# Patient Record
Sex: FEMALE | Race: BLACK OR AFRICAN AMERICAN | Employment: OTHER | URBAN - METROPOLITAN AREA
[De-identification: names, ages, dates, MRNs, and addresses within clinical notes are randomized per-mention and may not be internally consistent; named-entity substitution may affect disease eponyms.]

---

## 2017-01-04 ENCOUNTER — TELEPHONE (OUTPATIENT)
Dept: FAMILY MEDICINE CLINIC | Age: 67
End: 2017-01-04

## 2017-01-04 ENCOUNTER — OFFICE VISIT (OUTPATIENT)
Dept: FAMILY MEDICINE CLINIC | Age: 67
End: 2017-01-04

## 2017-01-04 VITALS
HEIGHT: 64 IN | DIASTOLIC BLOOD PRESSURE: 81 MMHG | SYSTOLIC BLOOD PRESSURE: 157 MMHG | OXYGEN SATURATION: 96 % | TEMPERATURE: 98.8 F | WEIGHT: 198.6 LBS | RESPIRATION RATE: 20 BRPM | HEART RATE: 96 BPM | BODY MASS INDEX: 33.9 KG/M2

## 2017-01-04 DIAGNOSIS — I10 ESSENTIAL HYPERTENSION WITH GOAL BLOOD PRESSURE LESS THAN 140/90: Primary | ICD-10-CM

## 2017-01-04 DIAGNOSIS — M25.511 BILATERAL SHOULDER PAIN, UNSPECIFIED CHRONICITY: ICD-10-CM

## 2017-01-04 DIAGNOSIS — Z91.89 COMPLIANCE WITH MEDICATION REGIMEN: ICD-10-CM

## 2017-01-04 DIAGNOSIS — E78.1 HYPERTRIGLYCERIDEMIA: ICD-10-CM

## 2017-01-04 DIAGNOSIS — E11.9 DIABETES MELLITUS TYPE 2, INSULIN DEPENDENT (HCC): ICD-10-CM

## 2017-01-04 DIAGNOSIS — E78.5 DYSLIPIDEMIA, GOAL LDL BELOW 70: ICD-10-CM

## 2017-01-04 DIAGNOSIS — Z79.4 DIABETES MELLITUS TYPE 2, INSULIN DEPENDENT (HCC): ICD-10-CM

## 2017-01-04 DIAGNOSIS — M25.512 BILATERAL SHOULDER PAIN, UNSPECIFIED CHRONICITY: ICD-10-CM

## 2017-01-04 DIAGNOSIS — J00 COMMON COLD: ICD-10-CM

## 2017-01-04 DIAGNOSIS — M17.0 PRIMARY OSTEOARTHRITIS OF BOTH KNEES: ICD-10-CM

## 2017-01-04 DIAGNOSIS — R94.4 DECREASED GFR: ICD-10-CM

## 2017-01-04 RX ORDER — OMEGA-3-ACID ETHYL ESTERS 1 G/1
2 CAPSULE, LIQUID FILLED ORAL
Qty: 180 CAP | Refills: 0 | Status: SHIPPED | COMMUNITY
Start: 2017-01-04 | End: 2017-04-26 | Stop reason: SDUPTHER

## 2017-01-04 RX ORDER — CARVEDILOL PHOSPHATE 20 MG/1
20 CAPSULE, EXTENDED RELEASE ORAL
Qty: 90 CAP | Refills: 3 | Status: SHIPPED | COMMUNITY
Start: 2017-01-04 | End: 2017-07-20 | Stop reason: ALTCHOICE

## 2017-01-04 RX ORDER — TRAMADOL HYDROCHLORIDE 50 MG/1
50 TABLET ORAL
Qty: 60 TAB | Refills: 0 | Status: SHIPPED | OUTPATIENT
Start: 2017-01-04 | End: 2017-02-02 | Stop reason: SDUPTHER

## 2017-01-04 RX ORDER — AMLODIPINE BESYLATE AND ATORVASTATIN CALCIUM 10; 80 MG/1; MG/1
1 TABLET, FILM COATED ORAL DAILY
Qty: 90 TAB | Refills: 0 | Status: SHIPPED | COMMUNITY
Start: 2017-01-04 | End: 2017-04-18 | Stop reason: SDUPTHER

## 2017-01-04 RX ORDER — LISINOPRIL 20 MG/1
20 TABLET ORAL DAILY
Qty: 30 TAB | Refills: 3 | Status: SHIPPED | OUTPATIENT
Start: 2017-01-04 | End: 2017-04-26 | Stop reason: SDUPTHER

## 2017-01-04 RX ORDER — AMLODIPINE BESYLATE AND ATORVASTATIN CALCIUM 10; 80 MG/1; MG/1
1 TABLET, FILM COATED ORAL DAILY
Qty: 90 TAB | Refills: 3 | Status: SHIPPED | COMMUNITY
Start: 2017-01-04 | End: 2017-07-20 | Stop reason: SDUPTHER

## 2017-01-04 RX ORDER — OMEGA-3-ACID ETHYL ESTERS 1 G/1
2 CAPSULE, LIQUID FILLED ORAL
Qty: 180 CAP | Refills: 3 | Status: SHIPPED | COMMUNITY
Start: 2017-01-04 | End: 2017-07-20 | Stop reason: SDUPTHER

## 2017-01-04 NOTE — PROGRESS NOTES
[address]  [id number] [address] [facility] [address] [facility] [address] [facility]  [phone] [facility]/[phone] fax      1/4/2017    Reason for visit:   Chief Complaint   Patient presents with    Results       Patient: Melba Garcia, 1950, [id number]       Primary MD: Angel Oleary NP    Subjective:   Melba Garcia, a 61 y.o. female, who presents for Results      Past Medical History   Diagnosis Date    Abnormal WBC count 5/17/2016    Bilateral shoulder pain 9/27/2016    Chondromalacia of both patellae     Chronic lung disease     Chronic obstructive pulmonary disease ([facility] [address] 5/17/2016    COPD (chronic obstructive pulmonary disease) ([facility] [address] 9/2015     mild-mod dz; Dr Madalyn Francois    Diabetes [facility]) 2013    Diabetes mellitus ([facility] [address]     Dilated cardiomyopathy ([facility] [address]     Dyslipidemia     Elevated alkaline phosphatase level 9/27/2016    History of echocardiogram 11/14/2014     Mild LVE. EF 40%. Mild, diffuse hypk. Mild LAE. Mild MR.      Hypercholesteremia 1/08/14    Hypertension 2000    Noncompliance with medications 2/16/2016    Obesity     Orthostatic hypotension 5/17/2016    Osteoarthritis of both knees     Pain management 04/01/2016     Dr. Cristine Mcgee Popliteal cyst, bilateral      Vitamin D deficiency 10/16/14       No past surgical history on file.     Social History     Social History    Marital status: LEGALLY      Spouse name: N/A    Number of children: 3    Years of education: N/A     Occupational History    retired      Social History Main Topics    Smoking status: Former Smoker     Packs/day: 0.25     Years: 45.00     Types: Cigarettes     Quit date: 8/17/2015    Smokeless tobacco: Not on file    Alcohol use No    Drug use: No    Sexual activity: Yes     Other Topics Concern     Service No    Blood Transfusions No    Caffeine Concern No    Occupational Exposure No    Hobby Hazards No    Sleep Concern No    Stress Concern No    Weight Concern No    Special Diet No    Back Care No    Exercise No    Bike Helmet No    Seat Belt Yes    Self-Exams Yes     Social History Narrative       No Known Allergies    Current Outpatient Prescriptions on File Prior to Visit   Medication Sig Dispense Refill    albuterol (PROVENTIL VENTOLIN) 2.5 mg /3 mL (0.083 %) nebulizer solution 3 mL by Nebulization route every four (4) hours as needed for Wheezing. 60 Each 3    carvedilol (COREG) 12.5 mg tablet Take 1 Tab by mouth two (2) times daily (with meals). Indications: HYPERTENSION 60 Tab 3    magnesium oxide (MAG-OX) 400 mg tablet Take 1 Tab by mouth daily. Indications: HYPOMAGNESEMIA 30 Tab 11    insulin detemir (LEVEMIR) 100 unit/mL injection 15 units daily  Indications: TYPE 2 DIABETES MELLITUS 6 Vial 3    ferrous sulfate 325 mg (65 mg iron) tablet Take 1 Tab by mouth two (2) times a day. Indications: IRON DEFICIENCY ANEMIA 60 Tab 11    albuterol (PROVENTIL HFA, VENTOLIN HFA, PROAIR HFA) 90 mcg/actuation inhaler Take 2 Puffs by inhalation every four (4) hours as needed for Wheezing. 3 Inhaler 3    desloratadine (CLARINEX) 5 mg tablet Take 1 Tab by mouth daily. 30 Tab 0    mometasone (NASONEX) 50 mcg/actuation nasal spray 2 Sprays by Both Nostrils route daily. 3 Container 3    umeclidinium-vilanterol (ANORO ELLIPTA) 62.5-25 mcg/actuation dsdv Take 1 Puff by inhalation daily. 4 Each 3     No current facility-administered medications on file prior to visit. Review of Systems   Constitutional:        I tried to get my care card again but [facility] said I have a bill for $127 and that is not enough to qualify me to get the care card again. I didn't know I had to  caduet in April, no one called me. HENT: Negative. Eyes: Negative. Respiratory: Positive for cough (started Dec 30. using Clarinex and nasonex. Non productive cough. ). Stopped smoking in Aug   Cardiovascular: Negative for chest pain, palpitations and leg swelling. Gastrointestinal: Negative. Genitourinary: Negative. Musculoskeletal: Positive for joint pain. Was going to pain mgt up until Sept when her care card  and couldn't afford $75 Last time she got meds was percocet and morphine in Sept. Ran out last month. rec'd 2 cortisone injections in knees while in pain mgt. Been taking Aleve PM, it dont take the pain away but helps me sleep. Pain in shoulders is new. Was seeing pain mgt only for knees. Pain score 10/10. Nothing aggravates the pain or makes it better. Skin: Negative. Neurological: Negative. Endo/Heme/Allergies: Positive for environmental allergies. Psychiatric/Behavioral: Negative. Objective:   Visit Vitals    /81    Pulse 96    Temp 98.8 °F (37.1 °C)    Resp 20    Ht 5' 4\" (1.626 m)    Wt 198 lb 9.6 oz (90.1 kg)    SpO2 96%    BMI 34.09 kg/m2      Wt Readings from Last 3 Encounters:   17 198 lb 9.6 oz (90.1 kg)   16 195 lb (88.5 kg)   16 197 lb (89.4 kg)     Lab Results   Component Value Date/Time    Glucose 148 2016 09:26 AM    Glucose (POC) 134 2015 11:25 AM    Glucose  2016 09:37 AM         Physical Exam   Constitutional: She is oriented to person, place, and time. She appears well-developed and well-nourished. HENT:   Head: Atraumatic. Neck: Neck supple. Cardiovascular: Normal rate, regular rhythm and normal heart sounds. No edema to LEs   Pulmonary/Chest: Effort normal and breath sounds normal.   Abdominal: Soft. Musculoskeletal:        Right shoulder: She exhibits crepitus. Right knee: She exhibits decreased range of motion. Lateral joint line tenderness: crepitus in Right knee. Left knee: She exhibits decreased range of motion. Right upper extremity weaker than the left. Pt able to maneuver her UEs w/o difficulty. Noted pt to have a steady gait. Neurological: She is alert and oriented to person, place, and time.    Skin: Skin is warm and dry. Psychiatric: She has a normal mood and affect. Her behavior is normal. Judgment and thought content normal.       Assessment:    Sharon Rich who has risk factors including (see above previous medical hx) and:     1. Essential hypertension with goal blood pressure less than 140/90  Pt is currently taking Coreg 12.5mg BID. Will prescribe coreg cr via TP for financial and managing issues. Instructed pt to complete the coreg 12.5mg in full before starting the coreg cr every day. Pt verb understanding    - amLODIPine-atorvastatin (CADUET) 10-80 mg per tablet; Take 1 Tab by mouth daily. Dispense: 90 Tab; Refill: 0  - lisinopril (PRINIVIL, ZESTRIL) 20 mg tablet; Take 1 Tab by mouth daily. Dispense: 30 Tab; Refill: 3  - carvedilol (COREG CR) 20 mg CR capsule; Take 1 Cap by mouth daily (with breakfast). Dispense: 90 Cap; Refill: 3  - amLODIPine-atorvastatin (CADUET) 10-80 mg per tablet; Take 1 Tab by mouth daily. Dispense: 90 Tab; Refill: 3    2. Hypertriglyceridemia  TG over 150, will have patient work on diet to reduce simple carbohydrates, alcohol and saturated fats from diet. A certain amount of triglycerides are needed for your body to function, but excess is stored as fat. Triglycerides have always been known to raise heart disease risk. Foods to lower triglycerides   Cold water fish such as salmon, tuna, and cod. Olive oil and walnut oil. Grapes and Blueberries. Beans, legumes, peas and lentils. Spinach, broccoli, and [place] sprouts. Tomatoes, oranges, grapefruit, garry, and limes. Foods to avoid  Butter and margarine are simply triglycerides in a stick or tub form. Palm, coconut oil,  Lard. Eating candy and sugary foods. Fatty red meat, poultry skin, butter, high-fat dairy products and shellfish.    - omega-3 acid ethyl esters (LOVAZA) 1 gram capsule; Take 2 Caps by mouth daily (with breakfast). Indications: HYPERTRIGLYCERIDEMIA  Dispense: 180 Cap;  Refill: 0  - omega-3 acid ethyl esters (LOVAZA) 1 gram capsule; Take 2 Caps by mouth daily (with breakfast). Dispense: 180 Cap; Refill: 3    3. Diabetes mellitus type 2, insulin dependent (HCC)  No chg in therapy at this time    4. Decreased GFR  Instructed pt to dc the NSAID. Will monitor. 5. Dyslipidemia, goal LDL below 70  Pt has not taking her caduet since April. Will restart Caduet for her cholesterol and HTN. - amLODIPine-atorvastatin (CADUET) 10-80 mg per tablet; Take 1 Tab by mouth daily. Dispense: 90 Tab; Refill: 3    6. Primary osteoarthritis of both knees  Due to pt not being able to go to pain mgt due to cost and not able to obtain the care card, will treat pts chronic pains. Notified pt that percocet and Morphine will not be prescribed through this office. Also notified pt that tramadol will not be prescribed every month and she needs to figure out a way to get back into pain mgt. Due to pts GFR being decreased and Creatinine elevated, instructed pt to dc the NSAID. Only safe treatment for pain mgt at this time is Tramadol.   - traMADol (ULTRAM) 50 mg tablet; Take 1 Tab by mouth every six (6) hours as needed for Pain. Max Daily Amount: 200 mg. Indications: PAIN  Dispense: 60 Tab; Refill: 0    7. Bilateral shoulder pain, unspecified chronicity    - traMADol (ULTRAM) 50 mg tablet; Take 1 Tab by mouth every six (6) hours as needed for Pain. Max Daily Amount: 200 mg. Indications: PAIN  Dispense: 60 Tab; Refill: 0    8. Compliance with medication regimen  Educated pt on the importance of taking their medication as described. Instructed the patient to read the medication label prior to taking any and all of their medications to prevent medication error. Encouraged patient to utilize a medication planner to help reduce any medication errors. 9. Common cold  The usual course and duration of illness is up to one and a half weeks for patients with a cold; symptoms persist an additional three days on average in smokers.  Instructed patient to stay hydrated with water. Antibiotics are not effective when treating a cold and may have side effects. Return to the office for review if the condition worsens or exceeds the expected time for recovery. Written instructions followed our verbal discussion of all information discussed above, pending tests ordered and future goals/plans. Patient expressed understanding of current diagnosis, planned testing, follow up and if needed to contact the office for any questions or concerns prior to the next visit. Plan:   Med reconciliation completed with patient. Reviewed side effects of medications with the patient. Questions were answered and patient verb understanding. Discussed lab results with patient  1. Microalbuminuria On an Ace   2. A1C  6.3 no chg in dose  3. Trig from 62 to 173 , HDL 52, LDL from 78 to 171 Not taking Caduet  4. GFR decreased from 50 to 47 and Creatinine elevated at 1.38 from 1.31. Stop NSAID and monitor. Orders Placed This Encounter    LIPID PANEL     Standing Status:   Future     Standing Expiration Date:   5/29/2017    HEMOGLOBIN A1C WITH EAG     Standing Status:   Future     Standing Expiration Date:   4/12/6748    METABOLIC PANEL, COMPREHENSIVE     Standing Status:   Future     Standing Expiration Date:   5/29/2017    omega-3 acid ethyl esters (LOVAZA) 1 gram capsule     Sig: Take 2 Caps by mouth daily (with breakfast). Indications: HYPERTRIGLYCERIDEMIA     Dispense:  180 Cap     Refill:  0     Order Specific Question:   Expiration Date     Answer:   6/17/2017     Order Specific Question:   Lot#     Answer:   [id number]     Order Specific Question:        Answer:   [employer]    omega-3 acid ethyl esters (LOVAZA) 1 gram capsule     Sig: Take 2 Caps by mouth daily (with breakfast). Dispense:  180 Cap     Refill:  3    amLODIPine-atorvastatin (CADUET) 10-80 mg per tablet     Sig: Take 1 Tab by mouth daily.      Dispense:  90 Tab     Refill:  0     Order Specific Question:   Expiration Date     Answer:   4/30/2018     Order Specific Question:   Lot#     Answer:   [id number]     Order Specific Question:        Answer:   [employer]    traMADol (ULTRAM) 50 mg tablet     Sig: Take 1 Tab by mouth every six (6) hours as needed for Pain. Max Daily Amount: 200 mg. Indications: PAIN     Dispense:  60 Tab     Refill:  0    lisinopril (PRINIVIL, ZESTRIL) 20 mg tablet     Sig: Take 1 Tab by mouth daily. Dispense:  30 Tab     Refill:  3    carvedilol (COREG CR) 20 mg CR capsule     Sig: Take 1 Cap by mouth daily (with breakfast). Dispense:  90 Cap     Refill:  3    amLODIPine-atorvastatin (CADUET) 10-80 mg per tablet     Sig: Take 1 Tab by mouth daily. Dispense:  90 Tab     Refill:  3     Current Outpatient Prescriptions   Medication Sig Dispense Refill    omega-3 acid ethyl esters (LOVAZA) 1 gram capsule Take 2 Caps by mouth daily (with breakfast). Indications: HYPERTRIGLYCERIDEMIA 180 Cap 0    omega-3 acid ethyl esters (LOVAZA) 1 gram capsule Take 2 Caps by mouth daily (with breakfast). 180 Cap 3    amLODIPine-atorvastatin (CADUET) 10-80 mg per tablet Take 1 Tab by mouth daily. 90 Tab 0    traMADol (ULTRAM) 50 mg tablet Take 1 Tab by mouth every six (6) hours as needed for Pain. Max Daily Amount: 200 mg. Indications: PAIN 60 Tab 0    lisinopril (PRINIVIL, ZESTRIL) 20 mg tablet Take 1 Tab by mouth daily. 30 Tab 3    carvedilol (COREG CR) 20 mg CR capsule Take 1 Cap by mouth daily (with breakfast). 90 Cap 3    amLODIPine-atorvastatin (CADUET) 10-80 mg per tablet Take 1 Tab by mouth daily. 90 Tab 3    albuterol (PROVENTIL VENTOLIN) 2.5 mg /3 mL (0.083 %) nebulizer solution 3 mL by Nebulization route every four (4) hours as needed for Wheezing. 60 Each 3    carvedilol (COREG) 12.5 mg tablet Take 1 Tab by mouth two (2) times daily (with meals). Indications: HYPERTENSION 60 Tab 3    magnesium oxide (MAG-OX) 400 mg tablet Take 1 Tab by mouth daily.  Indications: HYPOMAGNESEMIA 30 Tab 11    insulin detemir (LEVEMIR) 100 unit/mL injection 15 units daily  Indications: TYPE 2 DIABETES MELLITUS 6 Vial 3    ferrous sulfate 325 mg (65 mg iron) tablet Take 1 Tab by mouth two (2) times a day. Indications: IRON DEFICIENCY ANEMIA 60 Tab 11    albuterol (PROVENTIL HFA, VENTOLIN HFA, PROAIR HFA) 90 mcg/actuation inhaler Take 2 Puffs by inhalation every four (4) hours as needed for Wheezing. 3 Inhaler 3    desloratadine (CLARINEX) 5 mg tablet Take 1 Tab by mouth daily. 30 Tab 0    mometasone (NASONEX) 50 mcg/actuation nasal spray 2 Sprays by Both Nostrils route daily. 3 Container 3    umeclidinium-vilanterol (ANORO ELLIPTA) 62.5-25 mcg/actuation dsdv Take 1 Puff by inhalation daily. 4 Each 3       Follow-up Disposition: Not on File    Labs needed for follow-up appt     Dalia Booth. Edie, [facility]   [address] I spent 25 minutes with the patient in face-to-face consultation, of which greater than 50% was spent in counseling and coordination of care as described above.

## 2017-01-04 NOTE — TELEPHONE ENCOUNTER
Medication: Proventil HFA, dose: 2 puffs, how often: every 4 hours as needed for wheezing, current number of medication days provided: 90, refill per application. Lot# [id number], EXP 01/2018 . This medication was received and verified for the following 1. Correct Patient, 2. Correct Diagnosis, 3. Correct Drug, 4. Correct route, and no current allergy to medication. Medication: Anoro 62.5/25 mcg, dose: 1 puff, how often: qd , current number of medication days provided: 90, refill per application. Lot #: [id number], EXP 01/2018. This medication was received and verified for the following 1. Correct Patient, 2. Correct Diagnosis, 3. Correct Drug, 4. Correct route, and no current allergy to medication. Medication: Clarinex 5mg, dose: 1 tab, how often: daily, current number of medication days provided: 90, refill per application. Lot # [id number], EXP 01/2018. This medication was received and verified for the following 1. Correct Patient, 2. Correct Diagnosis, 3. Correct Drug, 4. Correct route, and no current allergy to medication. Medication: Nasonex, dose: 2 sprays both nostrils, how often: qd, current number of medication days provided: 90, refill per application. Lot #: [id number], EXP 01/2018. This medication was received and verified for the following 1. Correct Patient, 2. Correct Diagnosis, 3. Correct Drug, 4. Correct route, and no current allergy to medication. Please contact patient to come  their medications.      Anabela Green, MSN, RN, FNP-C     [facility]

## 2017-01-04 NOTE — LETTER
1/4/2017 [facility] [address] [facility], [address] Silvia Bourgeois, 1950, is picking up the following medications ordered from the [facility]: SAMPLE:  CADUET 10/80 MG #30 AND LOVAZA 1 GM #60. Emiliano Aranda Patient's Signature: _____________________________ Today's Date: 1/4/2017

## 2017-01-04 NOTE — MR AVS SNAPSHOT
Visit Information Date & Time Provider Department Dept. Phone Encounter #  
 1/4/2017  8:30 AM Louis Ford NP [address] [facility] [id number] Upcoming Health Maintenance Date Due  
 EYE EXAM RETINAL OR DILATED Q1 6/14/2017 HEMOGLOBIN A1C Q6M 6/21/2017 BREAST CANCER SCRN MAMMOGRAM 10/26/2017 MICROALBUMIN Q1 12/21/2017 LIPID PANEL Q1 12/21/2017 FOOT EXAM Q1 1/4/2018 FOBT Q 1 YEAR AGE 50-75 1/4/2018 PAP AKA CERVICAL CYTOLOGY 10/14/2018 DTaP/Tdap/Td series (2 - Td) 6/26/2024 Allergies as of 1/4/2017  Review Complete On: 1/4/2017 By: Lan Diallo No Known Allergies Current Immunizations  Reviewed on 1/8/2014 Name Date Influenza Vaccine Janoctavia Traore) 10/22/2015 Influenza Vaccine (Quad) PF 10/19/2016 Influenza Vaccine (Trivalent) 10/16/2014 Influenza Vaccine PF 1/8/2014 Pneumococcal Polysaccharide (PPSV-23) 8/27/2015  1:01 PM  
 Rabies Immune Globulin 6/26/2014  1:49 PM  
 Rabies Vaccine IM 7/10/2014 10:59 AM, 7/3/2014 10:55 AM, 6/29/2014  9:28 AM, 6/26/2014  1:46 PM  
 Tdap 6/26/2014  1:06 PM  
  
 Not reviewed this visit You Were Diagnosed With   
  
 Codes Comments Hypertriglyceridemia    -  Primary ICD-10-CM: E78.1 ICD-9-CM: 272.1 Essential hypertension     ICD-10-CM: I10 
ICD-9-CM: 401.9 Diabetes mellitus type 2, insulin dependent (HCC)     ICD-10-CM: E11.9, Z79.4 ICD-9-CM: 250.00, V58.67 Decreased GFR     ICD-10-CM: R94.4 ICD-9-CM: 794.4 Dyslipidemia, goal LDL below 70     ICD-10-CM: E78.5 ICD-9-CM: 272.4 Primary osteoarthritis of both knees     ICD-10-CM: M17.0 ICD-9-CM: 715.16 Compliance with medication regimen     ICD-10-CM: Z91.89 ICD-9-CM: V49.89 Bilateral shoulder pain, unspecified chronicity     ICD-10-CM: M25.511, M25.512 ICD-9-CM: 719.41 Essential hypertension with goal blood pressure less than 140/90     ICD-10-CM: I10 
ICD-9-CM: 401.9 Vitals BP Pulse Temp Resp Height(growth percentile) Weight(growth percentile) 157/81 96 98.8 °F (37.1 °C) 20 5' 4\" (1.626 m) 198 lb 9.6 oz (90.1 kg) SpO2 BMI OB Status Smoking Status 96% 34.09 kg/m2 Menopause Former Smoker BMI and BSA Data Body Mass Index Body Surface Area 34.09 kg/m 2 2.02 m 2 Preferred Pharmacy Pharmacy Name Phone [facility] [address]. [phone] Your Updated Medication List  
  
   
This list is accurate as of: 1/4/17  9:18 AM.  Always use your most recent med list.  
  
  
  
  
 * albuterol 90 mcg/actuation inhaler Commonly known as:  PROVENTIL HFA, VENTOLIN HFA, PROAIR HFA Take 2 Puffs by inhalation every four (4) hours as needed for Wheezing. * albuterol 2.5 mg /3 mL (0.083 %) nebulizer solution Commonly known as:  PROVENTIL VENTOLIN  
3 mL by Nebulization route every four (4) hours as needed for Wheezing. * amLODIPine-atorvastatin 10-80 mg per tablet Commonly known as:  CADUET Take 1 Tab by mouth daily. * amLODIPine-atorvastatin 10-80 mg per tablet Commonly known as:  CADUET Take 1 Tab by mouth daily. carvedilol 12.5 mg tablet Commonly known as:  Ozie Bitter Take 1 Tab by mouth two (2) times daily (with meals). Indications: HYPERTENSION  
  
 carvedilol 20 mg CR capsule Commonly known as:  COREG CR Take 1 Cap by mouth daily (with breakfast). desloratadine 5 mg tablet Commonly known as:  CLARINEX Take 1 Tab by mouth daily. ferrous sulfate 325 mg (65 mg iron) tablet Take 1 Tab by mouth two (2) times a day. Indications: IRON DEFICIENCY ANEMIA  
  
 insulin detemir 100 unit/mL injection Commonly known as:  LEVEMIR  
15 units daily  Indications: TYPE 2 DIABETES MELLITUS  
  
 lisinopril 20 mg tablet Commonly known as:  Marilynne Shows Take 1 Tab by mouth daily. magnesium oxide 400 mg tablet Commonly known as:  MAG-OX Take 1 Tab by mouth daily. Indications: HYPOMAGNESEMIA  
  
 mometasone 50 mcg/actuation nasal spray Commonly known as:  NASONEX  
2 Sprays by Both Nostrils route daily. * omega-3 acid ethyl esters 1 gram capsule Commonly known as:  Shannannn Raw Take 2 Caps by mouth daily (with breakfast). Indications: HYPERTRIGLYCERIDEMIA  
  
 * omega-3 acid ethyl esters 1 gram capsule Commonly known as:  Carollynn Raw Take 2 Caps by mouth daily (with breakfast). traMADol 50 mg tablet Commonly known as:  ULTRAM  
Take 1 Tab by mouth every six (6) hours as needed for Pain. Max Daily Amount: 200 mg. Indications: PAIN  
  
 umeclidinium-vilanterol 62.5-25 mcg/actuation inhaler Commonly known as:  Rosalita Cocker Take 1 Puff by inhalation daily. * Notice: This list has 6 medication(s) that are the same as other medications prescribed for you. Read the directions carefully, and ask your doctor or other care provider to review them with you. Prescriptions Printed Refills  
 omega-3 acid ethyl esters (LOVAZA) 1 gram capsule 3 Sig: Take 2 Caps by mouth daily (with breakfast). Class: Program  
 Route: Oral  
 traMADol (ULTRAM) 50 mg tablet 0 Sig: Take 1 Tab by mouth every six (6) hours as needed for Pain. Max Daily Amount: 200 mg. Indications: PAIN Class: Print Route: Oral  
 carvedilol (COREG CR) 20 mg CR capsule 3 Sig: Take 1 Cap by mouth daily (with breakfast). Class: Program  
 Route: Oral  
 amLODIPine-atorvastatin (CADUET) 10-80 mg per tablet 3 Sig: Take 1 Tab by mouth daily. Class: Program  
 Route: Oral  
  
Prescriptions Sent to Mail Order Refills  
 omega-3 acid ethyl esters (LOVAZA) 1 gram capsule 3 Sig: Take 2 Caps by mouth daily (with breakfast). Class: Program  
 Pharmacy: [address], [address] [place]. Ph #: [phone]  Route: Oral  
 carvedilol (COREG CR) 20 mg CR capsule 3  
 Sig: Take 1 Cap by mouth daily (with breakfast). Class: Program  
 Pharmacy: [address] [place] [address]. Rd[address] [place] [address], [address] [place] [address]. Ph #: [phone] Route: Oral  
 amLODIPine-atorvastatin (CADUET) 10-80 mg per tablet 3 Sig: Take 1 Tab by mouth daily. Class: Program  
 Pharmacy: [address] [place] [address]. Rd[address] [place] [address], [address] [place] [address]. Ph #: [phone] Route: Oral  
  
Prescriptions Sent to Pharmacy Refills  
 omega-3 acid ethyl esters (LOVAZA) 1 gram capsule 3 Sig: Take 2 Caps by mouth daily (with breakfast). Class: Program  
 Pharmacy: [address] [place] [address]. Rd[address] [place] [address], [address] [place] [address]. Ph #: [phone] Route: Oral  
 lisinopril (PRINIVIL, ZESTRIL) 20 mg tablet 3 Sig: Take 1 Tab by mouth daily. Class: Normal  
 Pharmacy: [address] [place] [address]. Rd[address] [place] [address], [place] [address]. Ph #: [phone] Route: Oral  
 carvedilol (COREG CR) 20 mg CR capsule 3 Sig: Take 1 Cap by mouth daily (with breakfast). Class: Program  
 Pharmacy: [address] [place] [address]. Rd[address] [place] [address], [address] [place] [address]. Ph #: [phone] Route: Oral  
 amLODIPine-atorvastatin (CADUET) 10-80 mg per tablet 3 Sig: Take 1 Tab by mouth daily. Class: Program  
 Pharmacy: [address] [place] [address]. Rd[address] [place] [address], [address] [place] [address]. Ph #: [phone] Route: Oral  
  
Introducing [facility]! Carlene Em introduces [employer] patient portal. Now you can access parts of your medical record, email your doctor's office, and request medication refills online. 1. In your internet browser, go to [email or web address] 2. Click on the First Time User? Click Here link in the Sign In box. You will see the New Member Sign Up page. 3. Enter your [employer] Access Code exactly as it appears below. You will not need to use this code after youve completed the sign-up process. If you do not sign up before the expiration date, you must request a new code. · [employer] Access Code: [email or web address] Expires: 4/4/2017  9:18 AM 
 
4. Enter the last four digits of your Social Security Number (xxxx) and Date of Birth (mm/dd/yyyy) as indicated and click Submit. You will be taken to the next sign-up page. 5. Create a [employer] ID. This will be your [employer] login ID and cannot be changed, so think of one that is secure and easy to remember. 6. Create a [employer] password. You can change your password at any time. 7. Enter your Password Reset Question and Answer. This can be used at a later time if you forget your password. 8. Enter your e-mail address. You will receive e-mail notification when new information is available in [address] 9. Click Sign Up. You can now view and download portions of your medical record. 10. Click the Download Summary menu link to download a portable copy of your medical information. If you have questions, please visit the Frequently Asked Questions section of the [employer] website. Remember, [employer] is NOT to be used for urgent needs. For medical emergencies, dial 911. Now available from your iPhone and Android! Please provide this summary of care documentation to your next provider. Your primary care clinician is listed as Venancio Snowden. If you have any questions after today's visit, please call [phone].

## 2017-01-19 ENCOUNTER — TELEPHONE (OUTPATIENT)
Dept: FAMILY MEDICINE CLINIC | Age: 67
End: 2017-01-19

## 2017-01-19 NOTE — TELEPHONE ENCOUNTER
Medication: Caduet 10mg/60mg, dose: 1 tab, how often: daily, current number of medication days provided: 90, refill per application. Lot #[id number], EXP 11/2018     This medication was received and verified for the following 1. Correct Patient, 2. Correct Diagnosis, 3. Correct Drug, 4. Correct route, and no current allergy to medication. Medication: Lovaza 1gm, dose: 2caps, how often:every morning with breakfast, current number of medication days provided: 90, refill per application. Lot #[id number], EXP 01/2018  . This medication was received and verified for the following 1. Correct Patient, 2. Correct Diagnosis, 3. Correct Drug, 4. Correct route, and no current allergy to medication. Please contact patient to come  their medications.      [facility] LONG TERM [facility] PharmD  [facility]

## 2017-02-02 DIAGNOSIS — M25.511 BILATERAL SHOULDER PAIN, UNSPECIFIED CHRONICITY: ICD-10-CM

## 2017-02-02 DIAGNOSIS — M17.0 PRIMARY OSTEOARTHRITIS OF BOTH KNEES: ICD-10-CM

## 2017-02-02 DIAGNOSIS — M25.512 BILATERAL SHOULDER PAIN, UNSPECIFIED CHRONICITY: ICD-10-CM

## 2017-02-02 RX ORDER — TRAMADOL HYDROCHLORIDE 50 MG/1
50 TABLET ORAL
Qty: 60 TAB | Refills: 0 | Status: SHIPPED | OUTPATIENT
Start: 2017-02-02 | End: 2017-04-26 | Stop reason: SDUPTHER

## 2017-02-02 NOTE — TELEPHONE ENCOUNTER
Pt was notified at her 1/4/17 appt that I would cover her Tramadol for now as she cant take NSAIDs and she is not in pain mgt due to cost due to pt failing to obtain care card. I also told pt I would not treat her with tramadol for chronic pain on long term basis. Pt needs to speak with [facility] in reference to her care card and work it out with pain mgt for pain med as this is her last Rx to be given from the [facility] for the chronic knee pain. 1. Primary osteoarthritis of both knees    - traMADol (ULTRAM) 50 mg tablet; Take 1 Tab by mouth every six (6) hours as needed for Pain. Max Daily Amount: 200 mg. Indications: Pain  Dispense: 60 Tab; Refill: 0    2. Bilateral shoulder pain, unspecified chronicity    - traMADol (ULTRAM) 50 mg tablet; Take 1 Tab by mouth every six (6) hours as needed for Pain. Max Daily Amount: 200 mg. Indications: Pain  Dispense: 60 Tab;  Refill: 0

## 2017-02-02 NOTE — TELEPHONE ENCOUNTER
Called Pt to let her know her rx for Tramadol was ready but she will need to contact [facility] to continue getting medications from pain management. Pt aware.

## 2017-04-13 ENCOUNTER — LAB ONLY (OUTPATIENT)
Dept: FAMILY MEDICINE CLINIC | Age: 67
End: 2017-04-13

## 2017-04-13 ENCOUNTER — TELEPHONE (OUTPATIENT)
Dept: FAMILY MEDICINE CLINIC | Age: 67
End: 2017-04-13

## 2017-04-13 ENCOUNTER — HOSPITAL ENCOUNTER (OUTPATIENT)
Dept: LAB | Age: 67
Discharge: HOME | End: 2017-04-13

## 2017-04-13 DIAGNOSIS — E78.1 HYPERTRIGLYCERIDEMIA: ICD-10-CM

## 2017-04-13 DIAGNOSIS — R74.8 ELEVATED ALKALINE PHOSPHATASE LEVEL: Primary | ICD-10-CM

## 2017-04-13 DIAGNOSIS — E78.5 DYSLIPIDEMIA, GOAL LDL BELOW 70: ICD-10-CM

## 2017-04-13 DIAGNOSIS — E11.9 DIABETES MELLITUS TYPE 2, INSULIN DEPENDENT (HCC): ICD-10-CM

## 2017-04-13 DIAGNOSIS — I10 ESSENTIAL HYPERTENSION WITH GOAL BLOOD PRESSURE LESS THAN 140/90: ICD-10-CM

## 2017-04-13 DIAGNOSIS — R94.4 DECREASED GFR: ICD-10-CM

## 2017-04-13 DIAGNOSIS — Z79.4 DIABETES MELLITUS TYPE 2, INSULIN DEPENDENT (HCC): ICD-10-CM

## 2017-04-13 LAB
ALBUMIN SERPL BCP-MCNC: 3.5 G/DL (ref 3.4–5)
ALBUMIN/GLOB SERPL: 1.3 {RATIO} (ref 0.8–1.7)
ALP SERPL-CCNC: 135 U/L (ref 45–117)
ALT SERPL-CCNC: 14 U/L (ref 13–56)
ANION GAP BLD CALC-SCNC: 5 MMOL/L (ref 3–18)
AST SERPL W P-5'-P-CCNC: 9 U/L (ref 15–37)
BILIRUB SERPL-MCNC: 0.3 MG/DL (ref 0.2–1)
BUN SERPL-MCNC: 26 MG/DL (ref 7–18)
BUN/CREAT SERPL: 19 (ref 12–20)
CALCIUM SERPL-MCNC: 9 MG/DL (ref 8.5–10.1)
CHLORIDE SERPL-SCNC: 112 MMOL/L (ref 100–108)
CHOLEST SERPL-MCNC: 150 MG/DL
CO2 SERPL-SCNC: 25 MMOL/L (ref 21–32)
CREAT SERPL-MCNC: 1.34 MG/DL (ref 0.6–1.3)
EST. AVERAGE GLUCOSE BLD GHB EST-MCNC: 143 MG/DL
GLOBULIN SER CALC-MCNC: 2.8 G/DL (ref 2–4)
GLUCOSE SERPL-MCNC: 138 MG/DL (ref 74–99)
HBA1C MFR BLD: 6.6 % (ref 4.2–5.6)
HDLC SERPL-MCNC: 40 MG/DL (ref 40–60)
HDLC SERPL: 3.8 {RATIO} (ref 0–5)
LDLC SERPL CALC-MCNC: 91.2 MG/DL (ref 0–100)
LIPID PROFILE,FLP: NORMAL
POTASSIUM SERPL-SCNC: 4.7 MMOL/L (ref 3.5–5.5)
PROT SERPL-MCNC: 6.3 G/DL (ref 6.4–8.2)
SODIUM SERPL-SCNC: 142 MMOL/L (ref 136–145)
TRIGL SERPL-MCNC: 94 MG/DL (ref ?–150)
VLDLC SERPL CALC-MCNC: 18.8 MG/DL

## 2017-04-13 PROCEDURE — 83036 HEMOGLOBIN GLYCOSYLATED A1C: CPT | Performed by: NURSE PRACTITIONER

## 2017-04-13 PROCEDURE — 80053 COMPREHEN METABOLIC PANEL: CPT | Performed by: NURSE PRACTITIONER

## 2017-04-13 PROCEDURE — 80061 LIPID PANEL: CPT | Performed by: NURSE PRACTITIONER

## 2017-04-13 NOTE — PROGRESS NOTES
Labs drawn on first attempt in right [facility]. Three identifiers used for confirmation: name,  and last of SSN. Lab orders verified.

## 2017-04-18 NOTE — TELEPHONE ENCOUNTER
Medication: Proventil HFA, dose: 2 puffs, how often: every 4 hours as needed for wheezing, current number of medication days provided: 90, refill per application. Lot# [id number], EXP 04/2018 . This medication was received and verified for the following 1. Correct Patient, 2. Correct Diagnosis, 3. Correct Drug, 4. Correct route, and no current allergy to medication. Medication: Clarinex 5mg, dose: 1 tab, how often: daily, current number of medication days provided: 90, refill per application. Lot # [id number], EXP 04/2018. This medication was received and verified for the following 1. Correct Patient, 2. Correct Diagnosis, 3. Correct Drug, 4. Correct route, and no current allergy to medication. Medication: Nasonex, dose: 2 sprays both nostrils, how often: qd, current number of medication days provided: 90, refill per application. Lot #: [id number], EXP 04/2018. This medication was received and verified for the following 1. Correct Patient, 2. Correct Diagnosis, 3. Correct Drug, 4. Correct route, and no current allergy to medication. Please contact patient to come  their medications.      Sky Hus, MSN,RN,[facility]

## 2017-04-18 NOTE — PROGRESS NOTES
Will review results with pt at 4/26/17 appt  1) A1c 6.6  2) TG from 173 to 94  3) Creatinine slightly elevated but improving; GFR remains low and improving

## 2017-04-26 ENCOUNTER — OFFICE VISIT (OUTPATIENT)
Dept: FAMILY MEDICINE CLINIC | Age: 67
End: 2017-04-26

## 2017-04-26 VITALS
DIASTOLIC BLOOD PRESSURE: 76 MMHG | TEMPERATURE: 98.9 F | HEART RATE: 76 BPM | HEIGHT: 64 IN | RESPIRATION RATE: 20 BRPM | SYSTOLIC BLOOD PRESSURE: 168 MMHG | BODY MASS INDEX: 32.74 KG/M2 | OXYGEN SATURATION: 98 % | WEIGHT: 191.8 LBS

## 2017-04-26 DIAGNOSIS — Z79.4 DIABETES MELLITUS TYPE 2, INSULIN DEPENDENT (HCC): ICD-10-CM

## 2017-04-26 DIAGNOSIS — J44.9 CHRONIC OBSTRUCTIVE PULMONARY DISEASE, UNSPECIFIED COPD TYPE (HCC): ICD-10-CM

## 2017-04-26 DIAGNOSIS — E11.9 DIABETES MELLITUS TYPE 2, INSULIN DEPENDENT (HCC): ICD-10-CM

## 2017-04-26 DIAGNOSIS — Z63.6 CAREGIVER STRESS: ICD-10-CM

## 2017-04-26 DIAGNOSIS — M25.512 BILATERAL SHOULDER PAIN, UNSPECIFIED CHRONICITY: ICD-10-CM

## 2017-04-26 DIAGNOSIS — M25.511 BILATERAL SHOULDER PAIN, UNSPECIFIED CHRONICITY: ICD-10-CM

## 2017-04-26 DIAGNOSIS — Z87.891 FORMER SMOKER: ICD-10-CM

## 2017-04-26 DIAGNOSIS — K21.00 GASTROESOPHAGEAL REFLUX DISEASE WITH ESOPHAGITIS: ICD-10-CM

## 2017-04-26 DIAGNOSIS — E83.42 HYPOMAGNESEMIA: ICD-10-CM

## 2017-04-26 DIAGNOSIS — I10 ESSENTIAL HYPERTENSION WITH GOAL BLOOD PRESSURE LESS THAN 140/90: Primary | ICD-10-CM

## 2017-04-26 DIAGNOSIS — M17.0 PRIMARY OSTEOARTHRITIS OF BOTH KNEES: ICD-10-CM

## 2017-04-26 DIAGNOSIS — Z91.09 ENVIRONMENTAL ALLERGIES: ICD-10-CM

## 2017-04-26 RX ORDER — LISINOPRIL 20 MG/1
20 TABLET ORAL DAILY
Qty: 30 TAB | Refills: 4 | Status: SHIPPED | OUTPATIENT
Start: 2017-04-26 | End: 2018-05-23

## 2017-04-26 RX ORDER — MOMETASONE FUROATE 50 UG/1
2 SPRAY, METERED NASAL
Qty: 3 CONTAINER | Refills: 3 | Status: SHIPPED | COMMUNITY
Start: 2017-04-26 | End: 2022-08-08

## 2017-04-26 RX ORDER — ALBUTEROL SULFATE 90 UG/1
2 AEROSOL, METERED RESPIRATORY (INHALATION)
Qty: 3 INHALER | Refills: 3 | Status: SHIPPED | COMMUNITY
Start: 2017-04-26 | End: 2017-07-20 | Stop reason: SDUPTHER

## 2017-04-26 RX ORDER — LANOLIN ALCOHOL/MO/W.PET/CERES
400 CREAM (GRAM) TOPICAL DAILY
Qty: 30 TAB | Refills: 11 | Status: SHIPPED | OUTPATIENT
Start: 2017-04-26 | End: 2018-06-21

## 2017-04-26 RX ORDER — DESLORATADINE 5 MG/1
5 TABLET ORAL
Qty: 30 TAB | Refills: 0 | Status: SHIPPED | COMMUNITY
Start: 2017-04-26 | End: 2022-08-08

## 2017-04-26 RX ORDER — TRAMADOL HYDROCHLORIDE 50 MG/1
50 TABLET ORAL
Qty: 60 TAB | Refills: 0 | Status: SHIPPED | OUTPATIENT
Start: 2017-04-26 | End: 2018-05-23

## 2017-04-26 RX ORDER — ESOMEPRAZOLE MAGNESIUM 40 MG/1
40 CAPSULE, DELAYED RELEASE ORAL
Qty: 90 CAP | Refills: 0 | Status: SHIPPED | COMMUNITY
Start: 2017-04-26 | End: 2018-06-21

## 2017-04-26 RX ORDER — DEXLANSOPRAZOLE 30 MG/1
30 CAPSULE, DELAYED RELEASE ORAL
Qty: 90 CAP | Refills: 0 | Status: SHIPPED | COMMUNITY
Start: 2017-04-26 | End: 2017-04-26 | Stop reason: RX

## 2017-04-26 RX ORDER — CARVEDILOL 12.5 MG/1
12.5 TABLET ORAL 2 TIMES DAILY WITH MEALS
Qty: 60 TAB | Refills: 3 | Status: SHIPPED | OUTPATIENT
Start: 2017-04-26 | End: 2017-07-20 | Stop reason: ALTCHOICE

## 2017-04-26 SDOH — SOCIAL STABILITY - SOCIAL INSECURITY: DEPENDENT RELATIVE NEEDING CARE AT HOME: Z63.6

## 2017-04-26 NOTE — LETTER
4/26/2017 [facility] [address] [facility], [address] Seb Shelton, 1950, is picking up the following medications ordered from the [facility] Program: SAMPLE:  NEXIUM 40 MG #90 Mackenzie Hernandez Patient's Signature: _____________________________ Today's Date: 4/26/2017

## 2017-04-26 NOTE — MR AVS SNAPSHOT
Visit Information Date & Time Provider Department Dept. Phone Encounter #  
 4/26/2017  9:30 AM Deloris Franco NP [address] [facility] [id number] Your Appointments 7/25/2017 10:00 AM  
Follow Up with Deloris Franco NP [address] [facility] ([facility]) Appt Note: 3 mth f/u  
 [address] [facility] [id number] 27[address] [facility] [address] Upcoming Health Maintenance Date Due  
 EYE EXAM RETINAL OR DILATED Q1 6/14/2017 HEMOGLOBIN A1C Q6M 10/13/2017 BREAST CANCER SCRN MAMMOGRAM 10/26/2017 MICROALBUMIN Q1 12/21/2017 FOOT EXAM Q1 1/4/2018 FOBT Q 1 YEAR AGE 50-75 1/4/2018 LIPID PANEL Q1 4/13/2018 PAP AKA CERVICAL CYTOLOGY 10/14/2018 DTaP/Tdap/Td series (2 - Td) 6/26/2024 Allergies as of 4/26/2017  Review Complete On: 4/26/2017 By: Johanna Trinidad No Known Allergies Current Immunizations  Reviewed on 1/8/2014 Name Date Influenza Vaccine Otila Nima) 10/22/2015 Influenza Vaccine (Quad) PF 10/19/2016 Influenza Vaccine (Trivalent) 10/16/2014 Influenza Vaccine PF 1/8/2014 Pneumococcal Polysaccharide (PPSV-23) 8/27/2015  1:01 PM  
 Rabies Immune Globulin 6/26/2014  1:49 PM  
 Rabies Vaccine IM 7/10/2014 10:59 AM, 7/3/2014 10:55 AM, 6/29/2014  9:28 AM, 6/26/2014  1:46 PM  
 Tdap 6/26/2014  1:06 PM  
  
 Not reviewed this visit You Were Diagnosed With   
  
 Codes Comments Chronic obstructive pulmonary disease, unspecified [facility] type ([facility]    -  Primary ICD-10-CM: J44.9 ICD-9-CM: 151 Essential hypertension with goal blood pressure less than 140/90     ICD-10-CM: I10 
ICD-9-CM: 401.9 Hypomagnesemia     ICD-10-CM: O66.77 
ICD-9-CM: 275.2 Diabetes mellitus type 2, insulin dependent (HCC)     ICD-10-CM: E11.9, Z79.4 ICD-9-CM: 250.00, V58.67 Wheezing     ICD-10-CM: R06.2 ICD-9-CM: 786.07 Former smoker     ICD-10-CM: G02.105 ICD-9-CM: V15.82 Environmental allergies     ICD-10-CM: Z91.09 
ICD-9-CM: V15.09 Primary osteoarthritis of both knees     ICD-10-CM: M17.0 ICD-9-CM: 715.16 Bilateral shoulder pain, unspecified chronicity     ICD-10-CM: M25.511, M25.512 ICD-9-CM: 719.41 Gastroesophageal reflux disease with esophagitis     ICD-10-CM: K21.0 ICD-9-CM: 530.11 Vitals BP Pulse Temp Resp Height(growth percentile) Weight(growth percentile) 168/76 76 98.9 °F (37.2 °C) 20 5' 4\" (1.626 m) 191 lb 12.8 oz (87 kg) SpO2 BMI OB Status Smoking Status 98% 32.92 kg/m2 Menopause Former Smoker BMI and BSA Data Body Mass Index Body Surface Area  
 32.92 kg/m 2 1.98 m 2 Preferred Pharmacy [facility] Name [facility] [address]. [phone] Your Updated Medication List  
  
   
This list is accurate as of: 4/26/17 10:26 AM.  Always use your most recent med list.  
  
  
  
  
 * albuterol 2.5 mg /3 mL (0.083 %) nebulizer solution Commonly known as:  PROVENTIL VENTOLIN  
3 mL by Nebulization route every four (4) hours as needed for Wheezing. * albuterol 90 mcg/actuation inhaler Commonly known as:  PROVENTIL HFA, VENTOLIN HFA, PROAIR HFA Take 2 Puffs by inhalation every four (4) hours as needed for Wheezing. amLODIPine-atorvastatin 10-80 mg per tablet Commonly known as:  CADUET Take 1 Tab by mouth daily. carvedilol 12.5 mg tablet Commonly known as:  Caity Francisco Take 1 Tab by mouth two (2) times daily (with meals). For blood pressure  
  
 carvedilol 20 mg CR capsule Commonly known as:  COREG CR Take 1 Cap by mouth daily (with breakfast). desloratadine 5 mg tablet Commonly known as:  CLARINEX Take 1 Tab by mouth daily as needed for Allergies. esomeprazole 40 mg capsule Commonly known as:  NexIUM Take 1 Cap by mouth daily as needed. For reflux  
  
 ferrous sulfate 325 mg (65 mg iron) tablet Take 1 Tab by mouth two (2) times a day. Indications: IRON DEFICIENCY ANEMIA  
  
 insulin detemir 100 unit/mL injection Commonly known as:  LEVEMIR  
15 units daily for diabetes  
  
 lisinopril 20 mg tablet Commonly known as:  Sydna Lies Take 1 Tab by mouth daily. For blood pressure  
  
 magnesium oxide 400 mg tablet Commonly known as:  MAG-OX Take 1 Tab by mouth daily. For low magnesium  
  
 mometasone 50 mcg/actuation nasal spray Commonly known as:  NASONEX  
2 Sprays by Both Nostrils route daily as needed. For allergies * omega-3 acid ethyl esters 1 gram capsule Commonly known as:  Dion Leisure Take 2 Caps by mouth daily (with breakfast). Indications: HYPERTRIGLYCERIDEMIA  
  
 * omega-3 acid ethyl esters 1 gram capsule Commonly known as:  Dion Leisure Take 2 Caps by mouth daily (with breakfast). traMADol 50 mg tablet Commonly known as:  ULTRAM  
Take 1 Tab by mouth every six (6) hours as needed. Max Daily Amount: 200 mg. For arthritic pains  
  
 umeclidinium-vilanterol 62.5-25 mcg/actuation inhaler Commonly known as:  Regulo Gant Take 1 Puff by inhalation daily. For COPD * Notice: This list has 4 medication(s) that are the same as other medications prescribed for you. Read the directions carefully, and ask your doctor or other care provider to review them with you. Prescriptions Printed Refills  
 albuterol (PROVENTIL HFA, VENTOLIN HFA, PROAIR HFA) 90 mcg/actuation inhaler 3 Sig: Take 2 Puffs by inhalation every four (4) hours as needed for Wheezing. Class: Program  
 Route: Inhalation  
 desloratadine (CLARINEX) 5 mg tablet 0 Sig: Take 1 Tab by mouth daily as needed for Allergies. Class: Program  
 Route: Oral  
 mometasone (NASONEX) 50 mcg/actuation nasal spray 3 Si Sprays by Both Nostrils route daily as needed. For allergies Class: Program  
 Route: Both Nostrils  
 umeclidinium-vilanterol (ANORO ELLIPTA) 62.5-25 mcg/actuation inhaler 3 Sig: Take 1 Puff by inhalation daily. For COPD Class: Program  
 Route: Inhalation  
 traMADol (ULTRAM) 50 mg tablet 0 Sig: Take 1 Tab by mouth every six (6) hours as needed. Max Daily Amount: 200 mg. For arthritic pains Class: Print Route: Oral  
  
Prescriptions Sent to Mail Order Refills  
 albuterol (PROVENTIL HFA, VENTOLIN HFA, PROAIR HFA) 90 mcg/actuation inhaler 3 Sig: Take 2 Puffs by inhalation every four (4) hours as needed for Wheezing. Class: Program  
 Pharmacy: [address] [place] [address]. Rd[address] [place] [address], [address] [place] [address]. Ph #: [phone] Route: Inhalation  
 desloratadine (CLARINEX) 5 mg tablet 0 Sig: Take 1 Tab by mouth daily as needed for Allergies. Class: Program  
 Pharmacy: [address] [place] [address]. Rd[address] [place] [address], [address] [place] [address]. Ph #: [phone] Route: Oral  
 mometasone (NASONEX) 50 mcg/actuation nasal spray 3 Si Sprays by Both Nostrils route daily as needed. For allergies Class: Program  
 Pharmacy: [address] [place] [address]. Rd[address] [place] [address], [address] [place] [address]. Ph #: [phone] Route: Both Nostrils  
 umeclidinium-vilanterol (ANORO ELLIPTA) 62.5-25 mcg/actuation inhaler 3 Sig: Take 1 Puff by inhalation daily. For COPD Class: Program  
 Pharmacy: [address] [place] [address]. Rd[address] [place] [address], [facility]. Ph #: [phone] Route: Inhalation Prescriptions Sent to Pharmacy Refills  
 lisinopril (PRINIVIL, ZESTRIL) 20 mg tablet 4 Sig: Take 1 Tab by mouth daily. For blood pressure Class: Normal  
 Pharmacy: [address] [place] [address]. Rd[address] [place], [place] [address]e, [place] [address]. Ph #: [phone] Route: Oral  
 magnesium oxide (MAG-OX) 400 mg tablet 11 Sig: Take 1 Tab by mouth daily. For low magnesium Class: Normal  
 Pharmacy: [address]. Ph #: [phone] Route: Oral  
 carvedilol (COREG) 12.5 mg tablet 3 Sig: Take 1 Tab by mouth two (2) times daily (with meals). For blood pressure Class: Normal  
 Pharmacy: [address] [place] [address]. Rd[address] [place] [address]. Ph #: [phone] Route: Oral  
 insulin detemir (LEVEMIR) 100 unit/mL injection 3 Sig: 15 units daily for diabetes Class: Normal  
 Pharmacy: [address] [place] [address]. Rd[address] [place] [address]. Ph #: [phone]  
 albuterol (PROVENTIL HFA, VENTOLIN HFA, PROAIR HFA) 90 mcg/actuation inhaler 3 Sig: Take 2 Puffs by inhalation every four (4) hours as needed for Wheezing. Class: Program  
 Pharmacy: [address] [place] [address]. Rd[address] [place] [address], [address] [place] [address]. Ph #: [phone] Route: Inhalation  
 desloratadine (CLARINEX) 5 mg tablet 0 Sig: Take 1 Tab by mouth daily as needed for Allergies. Class: Program  
 Pharmacy: [address] [place] [address]. Rd[address] [place] [address], [address] [place] [address]. Ph #: [phone] Route: Oral  
 mometasone (NASONEX) 50 mcg/actuation nasal spray 3 Si Sprays by Both Nostrils route daily as needed. For allergies Class: Program  
 Pharmacy: [address] [place] [address]. Rd[address] [place] [address], [facility]. Ph #: [phone] Route: Both Nostrils  
 umeclidinium-vilanterol (ANORO ELLIPTA) 62.5-25 mcg/actuation inhaler 3 Sig: Take 1 Puff by inhalation daily. For COPD Class: Program  
 Pharmacy: [address] [place] [address]. [address] [place] [address], [facility]. Ph #: [phone] Route: Inhalation Patient Instructions [facility] reminders! Foundation Operating Hours: These may change without notice. Mon- Wed 7am to 5pm. Closed for lunch 12-1pm 
Thurs 7am to 12pm 
 closed **In case of inclement weather (snow and ice) please do not try to come into the office for your appointment. Please call in and you will not be held as a [employer] \" SAFETY FIRST!!** 
 
 NO SHOW POLICY ~ If a patient has 3 no shows for an appointment with the Provider, Mental Health Provider, or the Nurse Navigator, they will be discharged from the practice for 6 months. Medication ordering will also be suspended. If the patient is discharged from the [facility], they can go to the [place] [address] where they can be seen for their primary needs plus obtain the same type of medications as they received at the [facility]. To avoid being discharged the patient must call the office at [phone] 24 hours prior to their appointment if they need to cancel or reschedule, arrive to their appointments on time (preferrably 15 minutes early) and come to all scheduled appointments with the provider, mental health, and/or nurse navigator. If the patient is discharged from the practice, they can apply to be re-established after 6 months. Lab work:   
Unless you are instructed differently, please return to the office between the hours of 7 am and 10:30 am Monday through Thursday to have your labs drawn one week before your next scheduled PROVIDER appointment. If you do not have an appointment to follow up on these results, please make one or plan to call the office if you do not hear from us to get the results. No news does not mean good news. Medications: [facility] or [facility] will assist you with your medications when available. Not all medications are available and may be obtained through local pharmacies such as [place] [address] that has a large $4 list.  
 
If your medications are new or have changed, and you get your medications from the [facility] [address] [place] [address]), you MUST talk to the pharmacy staff to sign the new prescription applications. If you don't sign the applications we cannot get the medications for you. It usually takes 6-8 weeks for your medications to arrive. The Pharmacy staff will call you when your medications are available.  You will have 30 days to come in and  your medications. If you don't  your medicines within those 30 days, those medicines may be placed on the self as samples and you will have to start all over again by completing the applications and waiting the 6-8 weeks for your medicines to arrive. Foot Care: [facility] through [facility] ([address]) Every second Tuesday of the month (except for holidays and election days) from 9am to 1 pm. The services provided by these ministry volunteers are free of charge with the option to donate. They will inspect your feet thoroughly, soak them for 10 minutes, cut and file your nails. They care for diabetics as well. Keep in mind this service is free and will be on a first come first serve basis. Bad teeth? Ask about the Dental Clinic to get you in front of a local dentist when a dentist is available. The bus leaves every other Wednesday for those on the list. (Ask about availability as these appointments are limited). DIABETES:  
Do you have uncontrolled diabetes or you just want to learn more about your diabetes? Schedule with the Nurse navigator for our new 5-week Diabetes program. You will learn how to properly manage your diabetes: nutrition, exercise, medication therapy. Eye exams for Diabetics. Please let us know so we can add you to the list to see the eye doctor at [facility]. These appointments are limited. You will receive a free eye exam and free glasses if needed. Unfortunately, if you are not a diabetic, we do not have a free service for eye exams for you (yet!). We do have information on where to go to get a huge discount on eye exams and glasses. Sick visits: If you are sick and it is not an emergency call the office to schedule an appointment to see the provider. Charges and cost items from the Foundation: Most of our orders are covered by [address] but there ARE SOME CHARGES for items such as radiology interpretations and anesthesiology during procedures and surgeries that are not covered under [employer]. Advanced Patient Advocacy (APA) Please make sure you have contacted the APA group to check on your payment options: www. [email or web address]. APA is available Mon - Fri 8-4pm at [facility] on the first floor by the information desk. Their number is [phone]. It is important that you are screened in order to qualify for assistance and to avoid huge medical charges. The [facility] is not responsible for ANY charges you may accrue regardless of who ordered the medication, procedure, treatment or test. If you go to the Emergency Room, you WILL be charged! Behavior and emotional issues! It is stressful to be sick, have an illness, take medications, not have a job, not have medical insurance, have family issues or just getting older! Schedule an appointment with our mental health provider. She is in the office Mondays and Wednesdays from 8am to [place] [address] can also contact the following: The national suicide hotline (5-662-324-VGQK or 3-177.305.4423) [address], [address] [place] [address] 
[phone] Community Services Board (CSB) [facility] [address] [facility], [address] [place] [address]  
[phone] Drug and Alcohol Addiction Issues! It is hard to stop a poor habit but there is help out there. Please feel free to attend any other the following support groups to help you kick the habit or go to [facility] Emergency Department to be evaluated by the psychiatric team. Never give up!! [place] meetings: Rin Isidro [place] MONDAY 10:30 AM Ronnie Mcginnis [address] [facility] SATURDAY 8:00 PM [facility] SATURDAY NIGHT REZA Miguel [address] [facility] Introducing [facility]! Toribio Nyhan introduces [employer]ada patient portal. Now you can access parts of your medical record, email your doctor's office, and request medication refills online. 1. In your internet browser, go to [email or web address] 2. Click on the First Time User? Click Here link in the Sign In box. You will see the New Member Sign Up page. 3. Enter your Trada Access Code exactly as it appears below. You will not need to use this code after youve completed the sign-up process. If you do not sign up before the expiration date, you must request a new code. · Trada Access Code: [email or web address] Expires: 7/25/2017  9:37 AM 
 
4. Enter the last four digits of your Social Security Number (xxxx) and Date of Birth (mm/dd/yyyy) as indicated and click Submit. You will be taken to the next sign-up page. 5. Create a Trada ID. This will be your Trada login ID and cannot be changed, so think of one that is secure and easy to remember. 6. Create a Trada password. You can change your password at any time. 7. Enter your Password Reset Question and Answer. This can be used at a later time if you forget your password. 8. Enter your e-mail address. You will receive e-mail notification when new information is available in [address] 9. Click Sign Up. You can now view and download portions of your medical record. 10. Click the Download Summary menu link to download a portable copy of your medical information. If you have questions, please visit the Frequently Asked Questions section of the Trada website. Remember, Trada is NOT to be used for urgent needs. For medical emergencies, dial 911. Now available from your iPhone and Android! Please provide this summary of care documentation to your next provider. Your primary care clinician is listed as Deloris Franco. If you have any questions after today's visit, please call [phone].

## 2017-04-26 NOTE — PROGRESS NOTES
[address]  [id number] [address] [facility] [address] [facility] [address], [address] [facility]  [phone] [facility]/[phone] fax      4/26/2017    Reason for visit:   Chief Complaint   Patient presents with    Results       Patient: Aman Snow, 1950, [id number]       Primary MD: Adriel Hendricks NP    Subjective:   Aman Snow, a 61 y.o. female, who presents for Results      Past Medical History:   Diagnosis Date    Abnormal WBC count 5/17/2016    Bilateral shoulder pain 9/27/2016    Caregiver stress 4/26/2017    Chondromalacia of both patellae     Chronic lung disease     Chronic obstructive pulmonary disease ([facility] [address] 5/17/2016    COPD (chronic obstructive pulmonary disease) ([facility] [address] 9/2015    mild-mod dz; Dr Sandra Aguila    Diabetes [facility]) 2013    Diabetes mellitus ([facility] [address]     Dilated cardiomyopathy ([facility] [address]     Dyslipidemia     Elevated alkaline phosphatase level 9/27/2016    History of echocardiogram 11/14/2014    Mild LVE. EF 40%. Mild, diffuse hypk. Mild LAE. Mild MR.      Hypercholesteremia 1/08/14    Hypertension 2000    Noncompliance with medications 2/16/2016    Obesity     Orthostatic hypotension 5/17/2016    Osteoarthritis of both knees     Pain management 04/01/2016    Dr. Harry Reagan Popliteal cyst, bilateral      Vitamin D deficiency 10/16/14       No past surgical history on file.     Social History     Social History    Marital status: LEGALLY      Spouse name: N/A    Number of children: 3    Years of education: N/A     Occupational History    retired      Social History Main Topics    Smoking status: Former Smoker     Packs/day: 0.25     Years: 45.00     Types: Cigarettes     Quit date: 8/17/2015    Smokeless tobacco: Not on file    Alcohol use No    Drug use: No    Sexual activity: Yes     Other Topics Concern     Service No    Blood Transfusions No    Caffeine Concern No    Occupational Exposure No    Hobby Hazards No    Sleep Concern No    Stress Concern No    Weight Concern No    Special Diet No    Back Care No    Exercise No    Bike Helmet No    Seat Belt Yes    Self-Exams Yes     Social History Narrative       No Known Allergies    Current Outpatient Prescriptions on File Prior to Visit   Medication Sig Dispense Refill    omega-3 acid ethyl esters (LOVAZA) 1 gram capsule Take 2 Caps by mouth daily (with breakfast). Indications: HYPERTRIGLYCERIDEMIA 180 Cap 0    carvedilol (COREG CR) 20 mg CR capsule Take 1 Cap by mouth daily (with breakfast). 90 Cap 3    amLODIPine-atorvastatin (CADUET) 10-80 mg per tablet Take 1 Tab by mouth daily. 90 Tab 3    albuterol (PROVENTIL VENTOLIN) 2.5 mg /3 mL (0.083 %) nebulizer solution 3 mL by Nebulization route every four (4) hours as needed for Wheezing. 60 Each 3    ferrous sulfate 325 mg (65 mg iron) tablet Take 1 Tab by mouth two (2) times a day. Indications: IRON DEFICIENCY ANEMIA 60 Tab 11    omega-3 acid ethyl esters (LOVAZA) 1 gram capsule Take 2 Caps by mouth daily (with breakfast). 180 Cap 3     No current facility-administered medications on file prior to visit. Review of Systems   Constitutional: Negative. HENT: Negative. Eyes: Negative. Respiratory: Negative for cough, shortness of breath and wheezing. Cardiovascular: Negative for chest pain, palpitations and leg swelling. Gastrointestinal: Positive for diarrhea, nausea and vomiting. When I eat something I can feel it going down. Going on for 2 months. Endocrine: Negative. Genitourinary: Negative. Musculoskeletal: Positive for arthralgias. My dad dont walk much and I have had to run up and down the stairs and that is killing my knees. Skin: Negative. Allergic/Immunologic: Positive for environmental allergies. Neurological: Negative. Hematological: Negative.     Psychiatric/Behavioral:        I am having a lot of stress from taking care of my father who came down from [place] to visit for 2 weeks but has been here for 2 months. He is leaving this weekend. Objective:   Visit Vitals    /76    Pulse 76    Temp 98.9 °F (37.2 °C)    Resp 20    Ht 5' 4\" (1.626 m)    Wt 191 lb 12.8 oz (87 kg)    SpO2 98%    BMI 32.92 kg/m2      Wt Readings from Last 3 Encounters:   04/26/17 191 lb 12.8 oz (87 kg)   01/04/17 198 lb 9.6 oz (90.1 kg)   09/27/16 195 lb (88.5 kg)     Lab Results   Component Value Date/Time    Glucose 138 04/13/2017 09:06 AM    Glucose (POC) 134 08/27/2015 11:25 AM    Glucose  02/16/2016 09:37 AM         Physical Exam   Constitutional: She is oriented to person, place, and time. She appears well-nourished. HENT:   Head: Atraumatic. Neck: Neck supple. Cardiovascular: Normal rate, regular rhythm and normal heart sounds. Pulmonary/Chest: Effort normal and breath sounds normal.   Abdominal: Soft. Neurological: She is alert and oriented to person, place, and time. Skin: Skin is warm and dry. Psychiatric: She has a normal mood and affect. Her behavior is normal. Judgment and thought content normal.       Assessment:    Erin Jesus who has risk factors including (see above previous medical hx) and:     1. Essential hypertension with goal blood pressure less than 140/90  Reviewed current value and goal, discussed dietary and stress changes that would help, discussed medications with correct way to take them and side effects that are typical and those that should be reported such as near syncope, ankle edema, rash or shortness of breath. Discussed consequences of poor management with vascular stress, increased occurrence of CVA and MI leading to death. Noted pts BP to be slightly today. Pt states it is stress related and does not wish to change her BP medication at this time as her father is returning back to his home in [place] this weekend. - lisinopril (PRINIVIL, ZESTRIL) 20 mg tablet; Take 1 Tab by mouth daily. For blood pressure  Dispense: 30 Tab;  Refill: 4  - carvedilol (COREG) 12.5 mg tablet; Take 1 Tab by mouth two (2) times daily (with meals). For blood pressure  Dispense: 60 Tab; Refill: 3    2. Chronic obstructive pulmonary disease, unspecified COPD type ([facility])    - albuterol (PROVENTIL HFA, VENTOLIN HFA, PROAIR HFA) 90 mcg/actuation inhaler; Take 2 Puffs by inhalation every four (4) hours as needed for Wheezing. Dispense: 3 Inhaler; Refill: 3  - umeclidinium-vilanterol (ANORO ELLIPTA) 62.5-25 mcg/actuation inhaler; Take 1 Puff by inhalation daily. For COPD  Dispense: 3 Inhaler; Refill: 3    3. Primary osteoarthritis of both knees  Will refill tramadol due to pt trying to get back into pain mgt but unable to afford the $76 per visit. [employer] is telling her that her $100 bill at pain mgt is not enough to allow her to apply for care card. I again explained to pt that I dont manage chronic pain and she needs to work with [employer] to get coverage for pain mgt. - traMADol (ULTRAM) 50 mg tablet; Take 1 Tab by mouth every six (6) hours as needed. Max Daily Amount: 200 mg. For arthritic pains  Dispense: 60 Tab; Refill: 0    4. Bilateral shoulder pain, unspecified chronicity    - traMADol (ULTRAM) 50 mg tablet; Take 1 Tab by mouth every six (6) hours as needed. Max Daily Amount: 200 mg. For arthritic pains  Dispense: 60 Tab; Refill: 0    5. Diabetes mellitus type 2, insulin dependent ([facility])  Continue same therapy. No change. - insulin detemir (LEVEMIR) 100 unit/mL injection; 15 units daily for diabetes  Dispense: 6 Vial; Refill: 3    6. Hypomagnesemia  Will continue Mag Ox as pts Mag level in Dec was only 1.9.    - magnesium oxide (MAG-OX) 400 mg tablet; Take 1 Tab by mouth daily. For low magnesium  Dispense: 30 Tab; Refill: 11    7. Environmental allergies    - desloratadine (CLARINEX) 5 mg tablet; Take 1 Tab by mouth daily as needed for Allergies. Dispense: 30 Tab;  Refill: 0  - mometasone (NASONEX) 50 mcg/actuation nasal spray; 2 Sprays by Both Nostrils route daily as needed. For allergies  Dispense: 3 Container; Refill: 3    8. Gastroesophageal reflux disease with esophagitis  Instructed pt to avoid foods that may stir up the reflux such as hot, spicy foods. Avoid laying down for 45 minutes after eating a meal.  Decrease the caffeine, acidic foods/drinks, elimination of alcohol and tobacco products. Discussed GERD treatment and the goal of it being a temporary measure while the patient improves their diet due to the risk of persistent GERD causing Candelaria's esophagus which is precancerous and can cause osteoporosis. Patient verbalized understanding and will work on diet. This medication is provided on a temporary basis and advised of the risk of low magnesium which may cause muscle cramping and risk of osteoporosis and hypokalemia. Originally ordered Dexilant 30mg but no samples were available. Then ordered Nexium 20mg and again no samples available. Nexium 40mg was the only PPI available as a sample. Pt will obtain today. - esomeprazole (NEXIUM) 40 mg capsule; Take 1 Cap by mouth daily as needed. For reflux  Dispense: 90 Cap; Refill: 0    9. Former smoker    - albuterol (PROVENTIL HFA, VENTOLIN HFA, PROAIR HFA) 90 mcg/actuation inhaler; Take 2 Puffs by inhalation every four (4) hours as needed for Wheezing. Dispense: 3 Inhaler; Refill: 3    10. Caregiver stress  Educated pt on the importance of avoiding or reducing the amt of stress they deal with in a day. Also recommended alone time and exercise to assist with reducing stress. Encouraged counseling to work on problems, develop coping mechanisms and have someone to leave the problems with who could help in resolving daily issues. Written instructions followed our verbal discussion of all information discussed above, pending tests ordered and future goals/plans.  Patient expressed understanding of current diagnosis, planned testing, follow up and if needed to contact the office for any questions or concerns prior to the next visit. Plan:   Med reconciliation completed with patient. Reviewed side effects of medications with the patient. Questions were answered and patient verb understanding. Discussed lab results with patient  Will review results with pt at 17 appt  1) A1c 6.6  2) TG from 173 to 94  3) Creatinine slightly elevated but improving; GFR remains low and improving    Orders Placed This Encounter    lisinopril (PRINIVIL, ZESTRIL) 20 mg tablet     Sig: Take 1 Tab by mouth daily. For blood pressure     Dispense:  30 Tab     Refill:  4    magnesium oxide (MAG-OX) 400 mg tablet     Sig: Take 1 Tab by mouth daily. For low magnesium     Dispense:  30 Tab     Refill:  11    carvedilol (COREG) 12.5 mg tablet     Sig: Take 1 Tab by mouth two (2) times daily (with meals). For blood pressure     Dispense:  60 Tab     Refill:  3    insulin detemir (LEVEMIR) 100 unit/mL injection     Sig: 15 units daily for diabetes     Dispense:  6 Vial     Refill:  3    albuterol (PROVENTIL HFA, VENTOLIN HFA, PROAIR HFA) 90 mcg/actuation inhaler     Sig: Take 2 Puffs by inhalation every four (4) hours as needed for Wheezing. Dispense:  3 Inhaler     Refill:  3    desloratadine (CLARINEX) 5 mg tablet     Sig: Take 1 Tab by mouth daily as needed for Allergies. Dispense:  30 Tab     Refill:  0    mometasone (NASONEX) 50 mcg/actuation nasal spray     Si Sprays by Both Nostrils route daily as needed. For allergies     Dispense:  3 Container     Refill:  3    umeclidinium-vilanterol (ANORO ELLIPTA) 62.5-25 mcg/actuation inhaler     Sig: Take 1 Puff by inhalation daily. For COPD     Dispense:  3 Inhaler     Refill:  3     9/22/15 Dr Alaniz All prescribed. Will order via [facility] if available    traMADol (ULTRAM) 50 mg tablet     Sig: Take 1 Tab by mouth every six (6) hours as needed. Max Daily Amount: 200 mg.  For arthritic pains     Dispense:  60 Tab     Refill:  0    DISCONTD: dexlansoprazole (DEXILANT) 30 mg capsule     Sig: Take 1 Cap by mouth daily as needed. For reflux     Dispense:  90 Cap     Refill:  0     Order Specific Question:   Expiration Date     Answer:   4/30/2018     Comments:   ERROR - MEDICATION NOT GIVEN NONE IN STOCK     Order Specific Question:   Lot#     Answer:   [id number]     Order Specific Question:        Answer:   Rory Marlow esomeprazole (NEXIUM) 40 mg capsule     Sig: Take 1 Cap by mouth daily as needed. For reflux     Dispense:  90 Cap     Refill:  0     Order Specific Question:   Expiration Date     Answer:   4/30/2018     Order Specific Question:   Lot#     Answer:   [id number]     Order Specific Question:        Answer:   AZ&ME     Current Outpatient Prescriptions   Medication Sig Dispense Refill    lisinopril (PRINIVIL, ZESTRIL) 20 mg tablet Take 1 Tab by mouth daily. For blood pressure 30 Tab 4    magnesium oxide (MAG-OX) 400 mg tablet Take 1 Tab by mouth daily. For low magnesium 30 Tab 11    carvedilol (COREG) 12.5 mg tablet Take 1 Tab by mouth two (2) times daily (with meals). For blood pressure 60 Tab 3    insulin detemir (LEVEMIR) 100 unit/mL injection 15 units daily for diabetes 6 Vial 3    albuterol (PROVENTIL HFA, VENTOLIN HFA, PROAIR HFA) 90 mcg/actuation inhaler Take 2 Puffs by inhalation every four (4) hours as needed for Wheezing. 3 Inhaler 3    desloratadine (CLARINEX) 5 mg tablet Take 1 Tab by mouth daily as needed for Allergies. 30 Tab 0    mometasone (NASONEX) 50 mcg/actuation nasal spray 2 Sprays by Both Nostrils route daily as needed. For allergies 3 Container 3    umeclidinium-vilanterol (ANORO ELLIPTA) 62.5-25 mcg/actuation inhaler Take 1 Puff by inhalation daily. For COPD 3 Inhaler 3    traMADol (ULTRAM) 50 mg tablet Take 1 Tab by mouth every six (6) hours as needed. Max Daily Amount: 200 mg. For arthritic pains 60 Tab 0    esomeprazole (NEXIUM) 40 mg capsule Take 1 Cap by mouth daily as needed.  For reflux 90 Cap 0    omega-3 acid ethyl esters (LOVAZA) 1 gram capsule Take 2 Caps by mouth daily (with breakfast). Indications: HYPERTRIGLYCERIDEMIA 180 Cap 0    carvedilol (COREG CR) 20 mg CR capsule Take 1 Cap by mouth daily (with breakfast). 90 Cap 3    amLODIPine-atorvastatin (CADUET) 10-80 mg per tablet Take 1 Tab by mouth daily. 90 Tab 3    albuterol (PROVENTIL VENTOLIN) 2.5 mg /3 mL (0.083 %) nebulizer solution 3 mL by Nebulization route every four (4) hours as needed for Wheezing. 60 Each 3    ferrous sulfate 325 mg (65 mg iron) tablet Take 1 Tab by mouth two (2) times a day. Indications: IRON DEFICIENCY ANEMIA 60 Tab 11    omega-3 acid ethyl esters (LOVAZA) 1 gram capsule Take 2 Caps by mouth daily (with breakfast). 180 Cap 3       Follow-up Disposition:  Return in about 3 months (around 7/26/2017). Labs needed for follow-up appt    \"No Show policy was reviewed with the patient. The services affected are the nurse navigator and the provider. No show appointments include missing labs for a future scheduled appointment, Pap/pelvics, arriving to appointment more than 10 minutes late, and calling to cancel appointment less than 24 hours in advance. After the 3rd No Show, the patient will be removed from the Foundation to include medications for 6 months. The patient will be referred to the [place] [id number] for their primary care needs. \"     Pt was also notified that if they miss appointments with the [facility] or any other provider in the office, this will count towards their No Show and after the 3rd No Show they will be dismissed from the office x6 months. Pt verb understanding. Jazmin Galvan. Edie RN, MSN, [employer]   [address] [place] [address] spent 30 minutes with the patient in face-to-face consultation, of which greater than 50% was spent in counseling and coordination of care as described above.

## 2017-04-26 NOTE — PATIENT INSTRUCTIONS
The [facility] reminders! Foundation Operating Hours: These may change without notice. Mon- Wed 7am to 5pm. Closed for lunch 12-1pm  Thurs 7am to 12pm  Fridays closed     **In case of inclement weather (snow and ice) please do not try to come into the office for your appointment. Please call in and you will not be held as a [employer]. \" SAFETY FIRST!!**    NO SHOW POLICY ~ If a patient has 3 no shows for an appointment with the Provider, Mental Health Provider, or the Nurse Navigator, they will be discharged from the practice for 6 months. Medication ordering will also be suspended. If the patient is discharged from the [facility], they can go to the [place] [address] where they can be seen for their primary needs plus obtain the same type of medications as they received at the [facility]. To avoid being discharged the patient must call the office at [phone] 24 hours prior to their appointment if they need to cancel or reschedule, arrive to their appointments on time (preferrably 15 minutes early) and come to all scheduled appointments with the provider, mental health, and/or nurse navigator. If the patient is discharged from the practice, they can apply to be re-established after 6 months. Lab work:    Unless you are instructed differently, please return to the office between the hours of 7 am and 10:30 am Monday through Thursday to have your labs drawn one week before your next scheduled PROVIDER appointment. If you do not have an appointment to follow up on these results, please make one or plan to call the office if you do not hear from us to get the results. No news does not mean good news. Medications: [facility] or C will assist you with your medications when available.  Not all medications are available and may be obtained through local pharmacies such as [place] [address] that has a large $4 list.     If your medications are new or have changed, and you get your medications from the [facility]), you MUST talk to the pharmacy staff to sign the new prescription applications. If you don't sign the applications we cannot get the medications for you. It usually takes 6-8 weeks for your medications to arrive. The Pharmacy staff will call you when your medications are available. You will have 30 days to come in and  your medications. If you don't  your medicines within those 30 days, those medicines may be placed on the self as samples and you will have to start all over again by completing the applications and waiting the 6-8 weeks for your medicines to arrive. Foot Care: Local [facility] through [facility] ([address] [facility])  Every second Tuesday of the month (except for holidays and election days) from 9am to 1 pm. The services provided by these ministry volunteers are free of charge with the option to donate. They will inspect your feet thoroughly, soak them for 10 minutes, cut and file your nails. They care for diabetics as well. Keep in mind this service is free and will be on a first come first serve basis. Bad teeth? Ask about the Dental Clinic to get you in front of a local dentist when a dentist is available. The bus leaves every other Wednesday for those on the list. (Ask about availability as these appointments are limited). DIABETES:   Do you have uncontrolled diabetes or you just want to learn more about your diabetes? Schedule with the Nurse navigator for our new 5-week Diabetes program. You will learn how to properly manage your diabetes: nutrition, exercise, medication therapy. Eye exams for Diabetics. Please let us know so we can add you to the list to see the eye doctor at [address] [place] [address]. These appointments are limited. You will receive a free eye exam and free glasses if needed. Unfortunately, if you are not a diabetic, we do not have a free service for eye exams for you (yet!).   We do have information on where to go to get a huge discount on eye exams and glasses. Sick visits: If you are sick and it is not an emergency call the office to schedule an appointment to see the provider. Charges and cost items from the Foundation:    Most of our orders are covered by [address] but there ARE SOME CHARGES for items such as radiology interpretations and anesthesiology during procedures and surgeries that are not covered under [place] [facility]. Advanced Patient Advocacy (APA)   Please make sure you have contacted the APA group to check on your payment options: [email or web address] APA is available Mon - Fri 8-4pm at [facility] on the first floor by the information desk. Their number is [phone]. It is important that you are screened in order to qualify for assistance and to avoid huge medical charges. The [facility] is not responsible for ANY charges you may accrue regardless of who ordered the medication, procedure, treatment or test. If you go to the Emergency Room, you WILL be charged! Behavior and emotional issues! It is stressful to be sick, have an illness, take medications, not have a job, not have medical insurance, have family issues or just getting older! Schedule an appointment with our mental health provider. She is in the office Mondays and Wednesdays from 8am to 405[address] [facility] can also contact the following: The national suicide hotline (3-586-393-JBIC or 3-144.641.1968)    [address], [address] [place] [address] [address] [place] [address] [facility]   [phone]    Drug and Alcohol Addiction Issues! It is hard to stop a poor habit but there is help out there. Please feel free to attend any other the following support groups to help you kick the habit or go to [facility] to be evaluated by the psychiatric team. Never give up!!     [place] meetings: [place], [place], [place]    [place] MONDAY 10:30 AM [place] [address] [facility] Floors 8:[address] [place] [address]

## 2017-05-03 ENCOUNTER — TELEPHONE (OUTPATIENT)
Dept: FAMILY MEDICINE CLINIC | Age: 67
End: 2017-05-03

## 2017-05-03 NOTE — TELEPHONE ENCOUNTER
Medication: Lovaza 1gm, dose: 2 caps, how often: every day with breakfasr, current number of medication days provided: 90, refill per application. , Lot #[id number], EXP 05/2018   This medication was received and verified for the following 1. Correct Patient, 2. Correct Diagnosis, 3. Correct Drug, 4. Correct route, and no current allergy to medication. Medication: Coreg CR 20 mg, dose: 1 cap, how often: daily, current number of medication days provided: 90, refill per application. Lot #[id number], EXP 05/2018 . This medication was received and verified for the following 1. Correct Patient, 2. Correct Diagnosis, 3. Correct Drug, 4. Correct route, and no current allergy to medication.      Thiago ThorntonD

## 2017-05-04 ENCOUNTER — TELEPHONE (OUTPATIENT)
Dept: FAMILY MEDICINE CLINIC | Age: 67
End: 2017-05-04

## 2017-05-04 DIAGNOSIS — E11.9 DIABETES MELLITUS TYPE 2, INSULIN DEPENDENT (HCC): Primary | ICD-10-CM

## 2017-05-04 DIAGNOSIS — Z79.4 DIABETES MELLITUS TYPE 2, INSULIN DEPENDENT (HCC): Primary | ICD-10-CM

## 2017-05-04 DIAGNOSIS — R79.89 ELEVATED SERUM CREATININE: ICD-10-CM

## 2017-05-08 NOTE — TELEPHONE ENCOUNTER
Medication: Caduet 10/80, dose: 1 tab, how often: daily, current number of medication days provided: 90, refill per application. Lot #:  # [id number], EXP 01/2019. This medication was received and verified for the following 1. Correct Patient, 2. Correct Diagnosis, 3. Correct Drug, 4. Correct route, and no current allergy to medication. Please contact patient to come  their medications.      Jaxon Fernandez, MSN,RN,[facility]

## 2017-05-31 ENCOUNTER — DOCUMENTATION ONLY (OUTPATIENT)
Dept: PAIN MANAGEMENT | Age: 67
End: 2017-05-31

## 2017-06-27 ENCOUNTER — TELEPHONE (OUTPATIENT)
Dept: FAMILY MEDICINE CLINIC | Age: 67
End: 2017-06-27

## 2017-06-29 NOTE — TELEPHONE ENCOUNTER
Medication: Proventil HFA, dose: 2 puffs, how often: every 4 hours as needed for wheezing, current number of medication days provided: 90, refill per application. Lot# [id number], EXP 06/2018 . This medication was received and verified for the following 1. Correct Patient, 2. Correct Diagnosis, 3. Correct Drug, 4. Correct route, and no current allergy to medication. Medication: Nasonex, dose: 2 sprays both nostrils, how often: every day as needed, current number of medication days provided: 90, refill per application. Lot #: [id number], EXP 06/2018. This medication was received and verified for the following 1. Correct Patient, 2. Correct Diagnosis, 3. Correct Drug, 4. Correct route, and no current allergy to medication. Medication: Anoro 62.5/25 , dose: 1 puff, how often: qd , current number of medication days provided: 90, refill per application. Lot #: [id number], EXP 06/2018. This medication was received and verified for the following 1. Correct Patient, 2. Correct Diagnosis, 3. Correct Drug, 4. Correct route, and no current allergy to medication. Please contact patient to come  their medications.      Donna Laura, MSN, RN, [facility]

## 2017-07-13 ENCOUNTER — HOSPITAL ENCOUNTER (OUTPATIENT)
Dept: RESPIRATORY THERAPY | Age: 67
Discharge: HOME | End: 2017-07-13
Attending: FAMILY MEDICINE
Payer: MEDICARE

## 2017-07-13 DIAGNOSIS — J44.9 COPD (CHRONIC OBSTRUCTIVE PULMONARY DISEASE) (HCC): ICD-10-CM

## 2017-07-13 PROCEDURE — 94010 BREATHING CAPACITY TEST: CPT

## 2017-07-13 PROCEDURE — 94726 PLETHYSMOGRAPHY LUNG VOLUMES: CPT

## 2017-07-13 PROCEDURE — 94729 DIFFUSING CAPACITY: CPT

## 2017-07-17 ENCOUNTER — HOSPITAL ENCOUNTER (OUTPATIENT)
Dept: MAMMOGRAPHY | Age: 67
Discharge: HOME | End: 2017-07-17
Attending: FAMILY MEDICINE
Payer: MEDICARE

## 2017-07-17 ENCOUNTER — HOSPITAL ENCOUNTER (OUTPATIENT)
Dept: BONE DENSITY | Age: 67
Discharge: HOME | End: 2017-07-17
Attending: FAMILY MEDICINE
Payer: MEDICARE

## 2017-07-17 DIAGNOSIS — M81.0 OSTEOPOROSIS: ICD-10-CM

## 2017-07-17 DIAGNOSIS — Z12.31 VISIT FOR SCREENING MAMMOGRAM: ICD-10-CM

## 2017-07-17 PROCEDURE — 77080 DXA BONE DENSITY AXIAL: CPT

## 2017-07-17 PROCEDURE — 77063 BREAST TOMOSYNTHESIS BI: CPT

## 2017-07-20 ENCOUNTER — TELEPHONE (OUTPATIENT)
Dept: FAMILY MEDICINE CLINIC | Age: 67
End: 2017-07-20

## 2017-07-20 DIAGNOSIS — J44.9 CHRONIC OBSTRUCTIVE PULMONARY DISEASE, UNSPECIFIED COPD TYPE (HCC): ICD-10-CM

## 2017-07-20 DIAGNOSIS — E11.9 DIABETES MELLITUS TYPE 2, INSULIN DEPENDENT (HCC): ICD-10-CM

## 2017-07-20 DIAGNOSIS — Z87.891 FORMER SMOKER: ICD-10-CM

## 2017-07-20 DIAGNOSIS — E78.5 DYSLIPIDEMIA, GOAL LDL BELOW 70: ICD-10-CM

## 2017-07-20 DIAGNOSIS — I10 ESSENTIAL HYPERTENSION WITH GOAL BLOOD PRESSURE LESS THAN 140/90: ICD-10-CM

## 2017-07-20 DIAGNOSIS — E78.1 HYPERTRIGLYCERIDEMIA: ICD-10-CM

## 2017-07-20 DIAGNOSIS — Z79.4 DIABETES MELLITUS TYPE 2, INSULIN DEPENDENT (HCC): ICD-10-CM

## 2017-07-20 RX ORDER — ALBUTEROL SULFATE 90 UG/1
2 AEROSOL, METERED RESPIRATORY (INHALATION)
Qty: 1 INHALER | Refills: 3 | Status: SHIPPED | OUTPATIENT
Start: 2017-07-20 | End: 2019-07-19

## 2017-07-20 RX ORDER — OMEGA-3-ACID ETHYL ESTERS 1 G/1
2 CAPSULE, LIQUID FILLED ORAL
Qty: 60 CAP | Refills: 3 | Status: SHIPPED | OUTPATIENT
Start: 2017-07-20 | End: 2018-06-21

## 2017-07-20 RX ORDER — AMLODIPINE BESYLATE AND ATORVASTATIN CALCIUM 10; 80 MG/1; MG/1
1 TABLET, FILM COATED ORAL DAILY
Qty: 30 TAB | Refills: 3 | Status: SHIPPED | OUTPATIENT
Start: 2017-07-20 | End: 2018-05-23

## 2017-07-20 NOTE — PROGRESS NOTES
Akash Guajardo,  Pt has [employer]. The following were e-scribed to [place]:    Requested Prescriptions     Signed Prescriptions Disp Refills    insulin detemir (LEVEMIR) 100 unit/mL injection 2 Vial 0     Sig: 15 units daily for diabetes    albuterol (PROVENTIL HFA, VENTOLIN HFA, PROAIR HFA) 90 mcg/actuation inhaler 1 Inhaler 3     Sig: Take 2 Puffs by inhalation every four (4) hours as needed for Wheezing.  omega-3 acid ethyl esters (LOVAZA) 1 gram capsule 60 Cap 3     Sig: Take 2 Caps by mouth daily (with breakfast).  amLODIPine-atorvastatin (CADUET) 10-80 mg per tablet 30 Tab 3     Sig: Take 1 Tab by mouth daily. Pt picked up Anoro, Proventil and nasonex 7/5/17. These are 3 month supply. Nexium, Iron supplement, and Clarinex are OTC. She has another refill for the lisinopril and refills for Mag remaining as well. Please encourage her to schedule an appt with Dr Kamari Slater' office asap to establish care as we can no longer care for her since she has medical insurance. 1. Diabetes mellitus type 2, insulin dependent (HCC)    - insulin detemir (LEVEMIR) 100 unit/mL injection; 15 units daily for diabetes  Dispense: 2 Vial; Refill: 0  - REFERRAL TO FAMILY PRACTICE    2. Chronic obstructive pulmonary disease, unspecified COPD type (HCC)    - albuterol (PROVENTIL HFA, VENTOLIN HFA, PROAIR HFA) 90 mcg/actuation inhaler; Take 2 Puffs by inhalation every four (4) hours as needed for Wheezing. Dispense: 1 Inhaler; Refill: 3  - REFERRAL TO FAMILY PRACTICE    3. Former smoker    - albuterol (PROVENTIL HFA, VENTOLIN HFA, PROAIR HFA) 90 mcg/actuation inhaler; Take 2 Puffs by inhalation every four (4) hours as needed for Wheezing. Dispense: 1 Inhaler; Refill: 3    4. Hypertriglyceridemia    - omega-3 acid ethyl esters (LOVAZA) 1 gram capsule; Take 2 Caps by mouth daily (with breakfast). Dispense: 60 Cap; Refill: 3  - REFERRAL TO FAMILY PRACTICE    5.  Dyslipidemia, goal LDL below 70    - amLODIPine-atorvastatin (CADUET) 10-80 mg per tablet; Take 1 Tab by mouth daily. Dispense: 30 Tab; Refill: 3  - REFERRAL TO FAMILY PRACTICE    6. Essential hypertension with goal blood pressure less than 140/90    - amLODIPine-atorvastatin (CADUET) 10-80 mg per tablet; Take 1 Tab by mouth daily. Dispense: 30 Tab;  Refill: 3  - REFERRAL TO FAMILY PRACTICE

## 2017-07-20 NOTE — TELEPHONE ENCOUNTER
Medication: Clarinex 5mg, dose: 1 tab, how often: daily as needed, current number of medication days provided: 90, refill per application. Lot # [id number], EXP 07/2018  . This medication was received and verified for the following 1. Correct Patient, 2. Correct Diagnosis, 3. Correct Drug, 4. Correct route, and no current allergy to medication. Please contact patient to come  their medications.      Stephie Saenz MSN, RN, [facility]

## 2017-07-20 NOTE — PROGRESS NOTES
Called patient and informed her that prescriptions were e-faxed to [facility]. She has received insurance and will not be coming to [facility], [facility]. She may call [employer] to find out if she has any co-pay or needs to bring in any insurance prescription card. Suggested to patient that she call her insurance and check to see if she has been assigned a primary card doctor or list to choose from. Patient informs that she has not looked into any PCP's yet. Patient given Dr Sheyla Urena contact information, per patient request.  Patient agrees to follow through with obtaining a PCP in order to continue her care.

## 2018-05-20 ENCOUNTER — ANESTHESIA (OUTPATIENT)
Dept: ICU | Age: 68
DRG: 246 | End: 2018-05-20
Payer: MEDICARE

## 2018-05-20 ENCOUNTER — APPOINTMENT (OUTPATIENT)
Dept: GENERAL RADIOLOGY | Age: 68
DRG: 246 | End: 2018-05-20
Attending: PHYSICIAN ASSISTANT
Payer: MEDICARE

## 2018-05-20 ENCOUNTER — HOSPITAL ENCOUNTER (INPATIENT)
Age: 68
LOS: 3 days | Discharge: HOME | DRG: 246 | End: 2018-05-23
Attending: EMERGENCY MEDICINE | Admitting: INTERNAL MEDICINE
Payer: MEDICARE

## 2018-05-20 ENCOUNTER — ANESTHESIA EVENT (OUTPATIENT)
Dept: ICU | Age: 68
DRG: 246 | End: 2018-05-20
Payer: MEDICARE

## 2018-05-20 DIAGNOSIS — I21.09 ST ELEVATION MYOCARDIAL INFARCTION (STEMI) OF ANTERIOR WALL (HCC): Primary | ICD-10-CM

## 2018-05-20 DIAGNOSIS — I21.02 ST ELEVATION MYOCARDIAL INFARCTION INVOLVING LEFT ANTERIOR DESCENDING (LAD) CORONARY ARTERY (HCC): ICD-10-CM

## 2018-05-20 PROBLEM — I21.3 STEMI (ST ELEVATION MYOCARDIAL INFARCTION) (HCC): Status: ACTIVE | Noted: 2018-05-20

## 2018-05-20 PROBLEM — J81.0 ACUTE PULMONARY EDEMA (HCC): Status: ACTIVE | Noted: 2018-05-20

## 2018-05-20 LAB
ACT BLD: 219 SECS (ref 79–138)
ACT BLD: 224 SECS (ref 79–138)
ALBUMIN SERPL-MCNC: 3.6 G/DL (ref 3.4–5)
ALBUMIN/GLOB SERPL: 1 {RATIO} (ref 0.8–1.7)
ALP SERPL-CCNC: 112 U/L (ref 45–117)
ALT SERPL-CCNC: 31 U/L (ref 13–56)
ANION GAP SERPL CALC-SCNC: 8 MMOL/L (ref 3–18)
AST SERPL-CCNC: 22 U/L (ref 15–37)
ATRIAL RATE: 109 BPM
BASOPHILS # BLD: 0 K/UL (ref 0–0.1)
BASOPHILS NFR BLD: 0 % (ref 0–2)
BILIRUB DIRECT SERPL-MCNC: <0.1 MG/DL (ref 0–0.2)
BILIRUB SERPL-MCNC: 0.4 MG/DL (ref 0.2–1)
BUN SERPL-MCNC: 28 MG/DL (ref 7–18)
BUN/CREAT SERPL: 17 (ref 12–20)
CALCIUM SERPL-MCNC: 9.1 MG/DL (ref 8.5–10.1)
CALCULATED P AXIS, ECG09: 81 DEGREES
CALCULATED R AXIS, ECG10: 23 DEGREES
CALCULATED T AXIS, ECG11: -165 DEGREES
CHLORIDE SERPL-SCNC: 109 MMOL/L (ref 100–108)
CK MB CFR SERPL CALC: 3.8 % (ref 0–4)
CK MB SERPL-MCNC: 5.4 NG/ML (ref 5–25)
CK SERPL-CCNC: 143 U/L (ref 26–192)
CO2 SERPL-SCNC: 22 MMOL/L (ref 21–32)
CREAT SERPL-MCNC: 1.61 MG/DL (ref 0.6–1.3)
DIAGNOSIS, 93000: NORMAL
DIFFERENTIAL METHOD BLD: ABNORMAL
EOSINOPHIL # BLD: 0.2 K/UL (ref 0–0.4)
EOSINOPHIL NFR BLD: 5 % (ref 0–5)
ERYTHROCYTE [DISTWIDTH] IN BLOOD BY AUTOMATED COUNT: 16.6 % (ref 11.6–14.5)
GLOBULIN SER CALC-MCNC: 3.7 G/DL (ref 2–4)
GLUCOSE BLD STRIP.AUTO-MCNC: 145 MG/DL (ref 70–110)
GLUCOSE SERPL-MCNC: 128 MG/DL (ref 74–99)
HCT VFR BLD AUTO: 36.2 % (ref 35–45)
HGB BLD-MCNC: 11.6 G/DL (ref 12–16)
LYMPHOCYTES # BLD: 1.3 K/UL (ref 0.9–3.6)
LYMPHOCYTES NFR BLD: 28 % (ref 21–52)
MCH RBC QN AUTO: 27.4 PG (ref 24–34)
MCHC RBC AUTO-ENTMCNC: 32 G/DL (ref 31–37)
MCV RBC AUTO: 85.6 FL (ref 74–97)
MONOCYTES # BLD: 0.4 K/UL (ref 0.05–1.2)
MONOCYTES NFR BLD: 8 % (ref 3–10)
NEUTS SEG # BLD: 2.8 K/UL (ref 1.8–8)
NEUTS SEG NFR BLD: 59 % (ref 40–73)
P-R INTERVAL, ECG05: 152 MS
PLATELET # BLD AUTO: 301 K/UL (ref 135–420)
PMV BLD AUTO: 9.7 FL (ref 9.2–11.8)
POTASSIUM SERPL-SCNC: 4.3 MMOL/L (ref 3.5–5.5)
PROT SERPL-MCNC: 7.3 G/DL (ref 6.4–8.2)
Q-T INTERVAL, ECG07: 316 MS
QRS DURATION, ECG06: 80 MS
QTC CALCULATION (BEZET), ECG08: 425 MS
RBC # BLD AUTO: 4.23 M/UL (ref 4.2–5.3)
SODIUM SERPL-SCNC: 139 MMOL/L (ref 136–145)
TROPONIN I BLD-MCNC: 0.5 NG/ML (ref 0–0.08)
TROPONIN I SERPL-MCNC: 0.92 NG/ML (ref 0–0.04)
VENTRICULAR RATE, ECG03: 109 BPM
WBC # BLD AUTO: 4.8 K/UL (ref 4.6–13.2)

## 2018-05-20 PROCEDURE — 84484 ASSAY OF TROPONIN QUANT: CPT

## 2018-05-20 PROCEDURE — 99285 EMERGENCY DEPT VISIT HI MDM: CPT

## 2018-05-20 PROCEDURE — 027034Z DILATION OF CORONARY ARTERY, ONE ARTERY WITH DRUG-ELUTING INTRALUMINAL DEVICE, PERCUTANEOUS APPROACH: ICD-10-PCS | Performed by: INTERNAL MEDICINE

## 2018-05-20 PROCEDURE — 82962 GLUCOSE BLOOD TEST: CPT

## 2018-05-20 PROCEDURE — 74011250636 HC RX REV CODE- 250/636

## 2018-05-20 PROCEDURE — 96374 THER/PROPH/DIAG INJ IV PUSH: CPT

## 2018-05-20 PROCEDURE — 96375 TX/PRO/DX INJ NEW DRUG ADDON: CPT

## 2018-05-20 PROCEDURE — B2111ZZ FLUOROSCOPY OF MULTIPLE CORONARY ARTERIES USING LOW OSMOLAR CONTRAST: ICD-10-PCS | Performed by: INTERNAL MEDICINE

## 2018-05-20 PROCEDURE — 74011636320 HC RX REV CODE- 636/320: Performed by: INTERNAL MEDICINE

## 2018-05-20 PROCEDURE — 74011000250 HC RX REV CODE- 250: Performed by: INTERNAL MEDICINE

## 2018-05-20 PROCEDURE — 80048 BASIC METABOLIC PNL TOTAL CA: CPT

## 2018-05-20 PROCEDURE — 93005 ELECTROCARDIOGRAM TRACING: CPT

## 2018-05-20 PROCEDURE — 94002 VENT MGMT INPAT INIT DAY: CPT

## 2018-05-20 PROCEDURE — 80076 HEPATIC FUNCTION PANEL: CPT

## 2018-05-20 PROCEDURE — 71045 X-RAY EXAM CHEST 1 VIEW: CPT

## 2018-05-20 PROCEDURE — 74011250637 HC RX REV CODE- 250/637: Performed by: INTERNAL MEDICINE

## 2018-05-20 PROCEDURE — 82553 CREATINE MB FRACTION: CPT

## 2018-05-20 PROCEDURE — 85025 COMPLETE CBC W/AUTO DIFF WBC: CPT

## 2018-05-20 PROCEDURE — 74011250637 HC RX REV CODE- 250/637

## 2018-05-20 PROCEDURE — 74011250636 HC RX REV CODE- 250/636: Performed by: INTERNAL MEDICINE

## 2018-05-20 PROCEDURE — B2151ZZ FLUOROSCOPY OF LEFT HEART USING LOW OSMOLAR CONTRAST: ICD-10-PCS | Performed by: INTERNAL MEDICINE

## 2018-05-20 PROCEDURE — 74011000250 HC RX REV CODE- 250

## 2018-05-20 PROCEDURE — 0BH17EZ INSERTION OF ENDOTRACHEAL AIRWAY INTO TRACHEA, VIA NATURAL OR ARTIFICIAL OPENING: ICD-10-PCS | Performed by: NURSE ANESTHETIST, CERTIFIED REGISTERED

## 2018-05-20 PROCEDURE — 4A023N7 MEASUREMENT OF CARDIAC SAMPLING AND PRESSURE, LEFT HEART, PERCUTANEOUS APPROACH: ICD-10-PCS | Performed by: INTERNAL MEDICINE

## 2018-05-20 PROCEDURE — C1894 INTRO/SHEATH, NON-LASER: HCPCS

## 2018-05-20 PROCEDURE — 85347 COAGULATION TIME ACTIVATED: CPT

## 2018-05-20 PROCEDURE — 5A1945Z RESPIRATORY VENTILATION, 24-96 CONSECUTIVE HOURS: ICD-10-PCS | Performed by: NURSE ANESTHETIST, CERTIFIED REGISTERED

## 2018-05-20 PROCEDURE — 74011250636 HC RX REV CODE- 250/636: Performed by: EMERGENCY MEDICINE

## 2018-05-20 PROCEDURE — 65620000000 HC RM CCU GENERAL

## 2018-05-20 RX ORDER — SODIUM CHLORIDE 9 MG/ML
INJECTION, SOLUTION INTRAVENOUS
Status: DISCONTINUED | OUTPATIENT
Start: 2018-05-20 | End: 2018-05-21 | Stop reason: HOSPADM

## 2018-05-20 RX ORDER — HEPARIN SODIUM 1000 [USP'U]/ML
5000 INJECTION, SOLUTION INTRAVENOUS; SUBCUTANEOUS
Status: COMPLETED | OUTPATIENT
Start: 2018-05-20 | End: 2018-05-21

## 2018-05-20 RX ORDER — OXYCODONE AND ACETAMINOPHEN 5; 325 MG/1; MG/1
1 TABLET ORAL
Status: DISCONTINUED | OUTPATIENT
Start: 2018-05-20 | End: 2018-05-23 | Stop reason: HOSPADM

## 2018-05-20 RX ORDER — SODIUM CHLORIDE 9 MG/ML
10 INJECTION INTRAMUSCULAR; INTRAVENOUS; SUBCUTANEOUS
Status: DISCONTINUED | OUTPATIENT
Start: 2018-05-20 | End: 2018-05-20

## 2018-05-20 RX ORDER — CEFAZOLIN SODIUM 2 G/50ML
2 SOLUTION INTRAVENOUS ONCE
Status: COMPLETED | OUTPATIENT
Start: 2018-05-20 | End: 2018-05-20

## 2018-05-20 RX ORDER — FUROSEMIDE 10 MG/ML
INJECTION INTRAMUSCULAR; INTRAVENOUS
Status: COMPLETED
Start: 2018-05-20 | End: 2018-05-20

## 2018-05-20 RX ORDER — AMLODIPINE BESYLATE 5 MG/1
5 TABLET ORAL DAILY
Status: DISCONTINUED | OUTPATIENT
Start: 2018-05-21 | End: 2018-05-20

## 2018-05-20 RX ORDER — GUAIFENESIN 100 MG/5ML
81 LIQUID (ML) ORAL DAILY
Status: DISCONTINUED | OUTPATIENT
Start: 2018-05-21 | End: 2018-05-20 | Stop reason: SDUPTHER

## 2018-05-20 RX ORDER — NITROGLYCERIN 0.4 MG/1
0.4 TABLET SUBLINGUAL
Status: ACTIVE | OUTPATIENT
Start: 2018-05-20 | End: 2018-05-21

## 2018-05-20 RX ORDER — HYDRALAZINE HYDROCHLORIDE 20 MG/ML
20 INJECTION INTRAMUSCULAR; INTRAVENOUS
Status: DISPENSED | OUTPATIENT
Start: 2018-05-20 | End: 2018-05-21

## 2018-05-20 RX ORDER — MIDAZOLAM HYDROCHLORIDE 1 MG/ML
.5-1 INJECTION, SOLUTION INTRAMUSCULAR; INTRAVENOUS
Status: DISCONTINUED | OUTPATIENT
Start: 2018-05-20 | End: 2018-05-21

## 2018-05-20 RX ORDER — BIVALIRUDIN 250 MG/5ML
0.75 INJECTION, POWDER, LYOPHILIZED, FOR SOLUTION INTRAVENOUS ONCE
Status: DISCONTINUED | OUTPATIENT
Start: 2018-05-20 | End: 2018-05-20

## 2018-05-20 RX ORDER — NITROGLYCERIN 40 MG/100ML
0-20 INJECTION INTRAVENOUS
Status: DISCONTINUED | OUTPATIENT
Start: 2018-05-20 | End: 2018-05-21

## 2018-05-20 RX ORDER — NALOXONE HYDROCHLORIDE 0.4 MG/ML
0.4 INJECTION, SOLUTION INTRAMUSCULAR; INTRAVENOUS; SUBCUTANEOUS AS NEEDED
Status: DISCONTINUED | OUTPATIENT
Start: 2018-05-20 | End: 2018-05-23 | Stop reason: HOSPADM

## 2018-05-20 RX ORDER — ACETAMINOPHEN 325 MG/1
650 TABLET ORAL
Status: DISCONTINUED | OUTPATIENT
Start: 2018-05-20 | End: 2018-05-23 | Stop reason: HOSPADM

## 2018-05-20 RX ORDER — IBUPROFEN 200 MG
1 TABLET ORAL EVERY 24 HOURS
Status: DISCONTINUED | OUTPATIENT
Start: 2018-05-20 | End: 2018-05-23 | Stop reason: HOSPADM

## 2018-05-20 RX ORDER — CLOPIDOGREL BISULFATE 75 MG/1
75 TABLET ORAL DAILY
Status: DISCONTINUED | OUTPATIENT
Start: 2018-05-21 | End: 2018-05-20

## 2018-05-20 RX ORDER — NITROGLYCERIN 0.4 MG/1
TABLET SUBLINGUAL
Status: COMPLETED
Start: 2018-05-20 | End: 2018-05-20

## 2018-05-20 RX ORDER — INSULIN LISPRO 100 [IU]/ML
INJECTION, SOLUTION INTRAVENOUS; SUBCUTANEOUS
Status: DISCONTINUED | OUTPATIENT
Start: 2018-05-20 | End: 2018-05-21

## 2018-05-20 RX ORDER — SODIUM CHLORIDE 9 MG/ML
75 INJECTION, SOLUTION INTRAVENOUS CONTINUOUS
Status: DISCONTINUED | OUTPATIENT
Start: 2018-05-20 | End: 2018-05-21

## 2018-05-20 RX ORDER — LANOLIN ALCOHOL/MO/W.PET/CERES
325 CREAM (GRAM) TOPICAL 2 TIMES DAILY
Status: DISCONTINUED | OUTPATIENT
Start: 2018-05-20 | End: 2018-05-23 | Stop reason: HOSPADM

## 2018-05-20 RX ORDER — GUAIFENESIN 100 MG/5ML
LIQUID (ML) ORAL
Status: COMPLETED
Start: 2018-05-20 | End: 2018-05-20

## 2018-05-20 RX ORDER — NITROGLYCERIN 0.4 MG/1
0.4 TABLET SUBLINGUAL
Status: COMPLETED | OUTPATIENT
Start: 2018-05-20 | End: 2018-05-20

## 2018-05-20 RX ORDER — METOPROLOL TARTRATE 25 MG/1
25 TABLET, FILM COATED ORAL EVERY 8 HOURS
Status: DISCONTINUED | OUTPATIENT
Start: 2018-05-20 | End: 2018-05-21

## 2018-05-20 RX ORDER — ONDANSETRON 2 MG/ML
4 INJECTION INTRAMUSCULAR; INTRAVENOUS
Status: DISCONTINUED | OUTPATIENT
Start: 2018-05-20 | End: 2018-05-20 | Stop reason: SDUPTHER

## 2018-05-20 RX ORDER — GUAIFENESIN 100 MG/5ML
81 LIQUID (ML) ORAL DAILY
Status: DISCONTINUED | OUTPATIENT
Start: 2018-05-21 | End: 2018-05-23 | Stop reason: HOSPADM

## 2018-05-20 RX ORDER — ATORVASTATIN CALCIUM 40 MG/1
80 TABLET, FILM COATED ORAL
Status: DISCONTINUED | OUTPATIENT
Start: 2018-05-20 | End: 2018-05-20 | Stop reason: SDUPTHER

## 2018-05-20 RX ORDER — FENTANYL CITRATE 50 UG/ML
12.5-1 INJECTION, SOLUTION INTRAMUSCULAR; INTRAVENOUS
Status: DISCONTINUED | OUTPATIENT
Start: 2018-05-20 | End: 2018-05-21

## 2018-05-20 RX ORDER — METOPROLOL TARTRATE 5 MG/5ML
INJECTION INTRAVENOUS
Status: COMPLETED
Start: 2018-05-20 | End: 2018-05-20

## 2018-05-20 RX ORDER — DEXTROSE 50 % IN WATER (D50W) INTRAVENOUS SYRINGE
25-50 AS NEEDED
Status: DISCONTINUED | OUTPATIENT
Start: 2018-05-20 | End: 2018-05-23 | Stop reason: HOSPADM

## 2018-05-20 RX ORDER — LISINOPRIL 20 MG/1
20 TABLET ORAL DAILY
Status: DISCONTINUED | OUTPATIENT
Start: 2018-05-20 | End: 2018-05-21

## 2018-05-20 RX ORDER — PROPOFOL 10 MG/ML
INJECTION, EMULSION INTRAVENOUS AS NEEDED
Status: DISCONTINUED | OUTPATIENT
Start: 2018-05-20 | End: 2018-05-21 | Stop reason: HOSPADM

## 2018-05-20 RX ORDER — SODIUM CHLORIDE 0.9 % (FLUSH) 0.9 %
5-10 SYRINGE (ML) INJECTION EVERY 8 HOURS
Status: DISCONTINUED | OUTPATIENT
Start: 2018-05-20 | End: 2018-05-23 | Stop reason: HOSPADM

## 2018-05-20 RX ORDER — LIDOCAINE HYDROCHLORIDE 10 MG/ML
1-60 INJECTION, SOLUTION EPIDURAL; INFILTRATION; INTRACAUDAL; PERINEURAL
Status: DISCONTINUED | OUTPATIENT
Start: 2018-05-20 | End: 2018-05-23 | Stop reason: HOSPADM

## 2018-05-20 RX ORDER — LABETALOL HYDROCHLORIDE 5 MG/ML
20 INJECTION, SOLUTION INTRAVENOUS ONCE
Status: COMPLETED | OUTPATIENT
Start: 2018-05-21 | End: 2018-05-20

## 2018-05-20 RX ORDER — FUROSEMIDE 10 MG/ML
20 INJECTION INTRAMUSCULAR; INTRAVENOUS
Status: COMPLETED | OUTPATIENT
Start: 2018-05-20 | End: 2018-05-20

## 2018-05-20 RX ORDER — MORPHINE SULFATE 4 MG/ML
INJECTION INTRAVENOUS
Status: COMPLETED
Start: 2018-05-20 | End: 2018-05-20

## 2018-05-20 RX ORDER — SUCCINYLCHOLINE CHLORIDE 20 MG/ML INJECTION SOLUTION
SOLUTION AS NEEDED
Status: DISCONTINUED | OUTPATIENT
Start: 2018-05-20 | End: 2018-05-21 | Stop reason: HOSPADM

## 2018-05-20 RX ORDER — LISINOPRIL 20 MG/1
20 TABLET ORAL DAILY
Status: DISCONTINUED | OUTPATIENT
Start: 2018-05-21 | End: 2018-05-20

## 2018-05-20 RX ORDER — NITROGLYCERIN 40 MG/100ML
INJECTION INTRAVENOUS
Status: COMPLETED
Start: 2018-05-20 | End: 2018-05-20

## 2018-05-20 RX ORDER — ACETAMINOPHEN 325 MG/1
650 TABLET ORAL
Status: DISCONTINUED | OUTPATIENT
Start: 2018-05-20 | End: 2018-05-20 | Stop reason: SDUPTHER

## 2018-05-20 RX ORDER — IPRATROPIUM BROMIDE AND ALBUTEROL SULFATE 2.5; .5 MG/3ML; MG/3ML
3 SOLUTION RESPIRATORY (INHALATION)
Status: DISCONTINUED | OUTPATIENT
Start: 2018-05-20 | End: 2018-05-23 | Stop reason: HOSPADM

## 2018-05-20 RX ORDER — MORPHINE SULFATE 4 MG/ML
1 INJECTION INTRAVENOUS ONCE
Status: COMPLETED | OUTPATIENT
Start: 2018-05-21 | End: 2018-05-20

## 2018-05-20 RX ORDER — HEPARIN SODIUM 1000 [USP'U]/ML
1000-10000 INJECTION, SOLUTION INTRAVENOUS; SUBCUTANEOUS AS NEEDED
Status: DISCONTINUED | OUTPATIENT
Start: 2018-05-20 | End: 2018-05-23 | Stop reason: HOSPADM

## 2018-05-20 RX ORDER — DOCUSATE SODIUM 100 MG/1
100 CAPSULE, LIQUID FILLED ORAL
Status: DISCONTINUED | OUTPATIENT
Start: 2018-05-20 | End: 2018-05-23 | Stop reason: HOSPADM

## 2018-05-20 RX ORDER — ATORVASTATIN CALCIUM 40 MG/1
80 TABLET, FILM COATED ORAL
Status: DISCONTINUED | OUTPATIENT
Start: 2018-05-20 | End: 2018-05-23

## 2018-05-20 RX ORDER — SODIUM CHLORIDE 0.9 % (FLUSH) 0.9 %
5-10 SYRINGE (ML) INJECTION AS NEEDED
Status: DISCONTINUED | OUTPATIENT
Start: 2018-05-20 | End: 2018-05-23 | Stop reason: HOSPADM

## 2018-05-20 RX ORDER — PROPOFOL 10 MG/ML
INJECTION, EMULSION INTRAVENOUS
Status: COMPLETED
Start: 2018-05-20 | End: 2018-05-20

## 2018-05-20 RX ORDER — LABETALOL HYDROCHLORIDE 5 MG/ML
INJECTION, SOLUTION INTRAVENOUS
Status: COMPLETED
Start: 2018-05-20 | End: 2018-05-20

## 2018-05-20 RX ORDER — HEPARIN SODIUM 1000 [USP'U]/ML
INJECTION, SOLUTION INTRAVENOUS; SUBCUTANEOUS
Status: COMPLETED
Start: 2018-05-20 | End: 2018-05-20

## 2018-05-20 RX ORDER — ATROPINE SULFATE 0.1 MG/ML
0.5 INJECTION INTRAVENOUS AS NEEDED
Status: DISCONTINUED | OUTPATIENT
Start: 2018-05-20 | End: 2018-05-23 | Stop reason: HOSPADM

## 2018-05-20 RX ORDER — INSULIN GLARGINE 100 [IU]/ML
10 INJECTION, SOLUTION SUBCUTANEOUS DAILY
Status: DISCONTINUED | OUTPATIENT
Start: 2018-05-21 | End: 2018-05-22

## 2018-05-20 RX ORDER — SUCCINYLCHOLINE CHLORIDE 20 MG/ML
INJECTION INTRAMUSCULAR; INTRAVENOUS
Status: COMPLETED
Start: 2018-05-20 | End: 2018-05-20

## 2018-05-20 RX ORDER — MORPHINE SULFATE 4 MG/ML
1 INJECTION INTRAVENOUS
Status: COMPLETED | OUTPATIENT
Start: 2018-05-20 | End: 2018-05-20

## 2018-05-20 RX ORDER — ONDANSETRON 2 MG/ML
4 INJECTION INTRAMUSCULAR; INTRAVENOUS
Status: ACTIVE | OUTPATIENT
Start: 2018-05-20 | End: 2018-05-21

## 2018-05-20 RX ORDER — FUROSEMIDE 10 MG/ML
20 INJECTION INTRAMUSCULAR; INTRAVENOUS DAILY PRN
Status: DISCONTINUED | OUTPATIENT
Start: 2018-05-20 | End: 2018-05-23 | Stop reason: HOSPADM

## 2018-05-20 RX ORDER — VERAPAMIL HYDROCHLORIDE 2.5 MG/ML
2.5-5 INJECTION, SOLUTION INTRAVENOUS ONCE
Status: ACTIVE | OUTPATIENT
Start: 2018-05-20 | End: 2018-05-21

## 2018-05-20 RX ORDER — METOPROLOL TARTRATE 25 MG/1
12.5 TABLET, FILM COATED ORAL EVERY 12 HOURS
Status: DISCONTINUED | OUTPATIENT
Start: 2018-05-21 | End: 2018-05-20

## 2018-05-20 RX ORDER — MAGNESIUM SULFATE 100 %
4 CRYSTALS MISCELLANEOUS AS NEEDED
Status: DISCONTINUED | OUTPATIENT
Start: 2018-05-20 | End: 2018-05-23 | Stop reason: HOSPADM

## 2018-05-20 RX ORDER — GUAIFENESIN 100 MG/5ML
324 LIQUID (ML) ORAL
Status: COMPLETED | OUTPATIENT
Start: 2018-05-20 | End: 2018-05-20

## 2018-05-20 RX ORDER — AMLODIPINE BESYLATE 5 MG/1
5 TABLET ORAL DAILY
Status: DISCONTINUED | OUTPATIENT
Start: 2018-05-20 | End: 2018-05-21

## 2018-05-20 RX ADMIN — NITROGLYCERIN 10 MCG/MIN: 40 INJECTION INTRAVENOUS at 21:40

## 2018-05-20 RX ADMIN — NITROGLYCERIN 10 MCG/MIN: 40 INJECTION INTRAVENOUS at 21:10

## 2018-05-20 RX ADMIN — HEPARIN SODIUM 1000 UNITS: 1000 INJECTION, SOLUTION INTRAVENOUS; SUBCUTANEOUS at 21:16

## 2018-05-20 RX ADMIN — SUCCINYLCHOLINE CHLORIDE 20 MG/ML INJECTION SOLUTION 180 MG: SOLUTION at 23:30

## 2018-05-20 RX ADMIN — OXYCODONE HYDROCHLORIDE AND ACETAMINOPHEN 1 TABLET: 5; 325 TABLET ORAL at 22:04

## 2018-05-20 RX ADMIN — FUROSEMIDE 40 MG: 10 INJECTION INTRAVENOUS at 22:55

## 2018-05-20 RX ADMIN — HYDRALAZINE HYDROCHLORIDE 20 MG: 20 INJECTION INTRAMUSCULAR; INTRAVENOUS at 22:35

## 2018-05-20 RX ADMIN — METHYLPREDNISOLONE SODIUM SUCCINATE 125 MG: 125 INJECTION, POWDER, FOR SOLUTION INTRAMUSCULAR; INTRAVENOUS at 19:40

## 2018-05-20 RX ADMIN — METOPROLOL TARTRATE 5 MG: 5 INJECTION, SOLUTION INTRAVENOUS at 20:33

## 2018-05-20 RX ADMIN — MORPHINE SULFATE 1 MG: 4 INJECTION INTRAVENOUS at 19:41

## 2018-05-20 RX ADMIN — FENTANYL CITRATE 50 MCG: 50 INJECTION INTRAMUSCULAR; INTRAVENOUS at 20:39

## 2018-05-20 RX ADMIN — ATORVASTATIN CALCIUM 80 MG: 40 TABLET, FILM COATED ORAL at 22:04

## 2018-05-20 RX ADMIN — Medication 10 ML: at 22:08

## 2018-05-20 RX ADMIN — CEFAZOLIN SODIUM 2 G: 2 SOLUTION INTRAVENOUS at 21:00

## 2018-05-20 RX ADMIN — METHYLPREDNISOLONE SODIUM SUCCINATE 40 MG: 125 INJECTION, POWDER, FOR SOLUTION INTRAMUSCULAR; INTRAVENOUS at 22:05

## 2018-05-20 RX ADMIN — HEPARIN SODIUM 1000 UNITS: 1000 INJECTION, SOLUTION INTRAVENOUS; SUBCUTANEOUS at 20:28

## 2018-05-20 RX ADMIN — MORPHINE SULFATE 1 MG: 4 INJECTION INTRAVENOUS at 23:02

## 2018-05-20 RX ADMIN — LIDOCAINE HYDROCHLORIDE 10 ML: 10 INJECTION, SOLUTION EPIDURAL; INFILTRATION; INTRACAUDAL; PERINEURAL at 20:09

## 2018-05-20 RX ADMIN — SODIUM CHLORIDE: 9 INJECTION, SOLUTION INTRAVENOUS at 23:06

## 2018-05-20 RX ADMIN — NITROGLYCERIN 0.4 MG: 0.4 TABLET, ORALLY DISINTEGRATING SUBLINGUAL at 19:31

## 2018-05-20 RX ADMIN — HEPARIN SODIUM 4000 UNITS: 1000 INJECTION, SOLUTION INTRAVENOUS; SUBCUTANEOUS at 20:14

## 2018-05-20 RX ADMIN — NITROGLYCERIN 0.4 MG: 0.4 TABLET SUBLINGUAL at 19:31

## 2018-05-20 RX ADMIN — IOPAMIDOL 150 ML: 612 INJECTION, SOLUTION INTRAVENOUS at 21:17

## 2018-05-20 RX ADMIN — LABETALOL HYDROCHLORIDE 20 MG: 5 INJECTION, SOLUTION INTRAVENOUS at 23:02

## 2018-05-20 RX ADMIN — HEPARIN SODIUM 5000 UNITS: 1000 INJECTION, SOLUTION INTRAVENOUS; SUBCUTANEOUS at 19:35

## 2018-05-20 RX ADMIN — Medication 324 MG: at 19:31

## 2018-05-20 RX ADMIN — HEPARIN SODIUM 2000 UNITS: 1000 INJECTION, SOLUTION INTRAVENOUS; SUBCUTANEOUS at 20:16

## 2018-05-20 RX ADMIN — FENTANYL CITRATE 25 MCG: 50 INJECTION INTRAMUSCULAR; INTRAVENOUS at 20:10

## 2018-05-20 RX ADMIN — MIDAZOLAM HYDROCHLORIDE 1 MG: 1 INJECTION, SOLUTION INTRAMUSCULAR; INTRAVENOUS at 20:25

## 2018-05-20 RX ADMIN — HEPARIN SODIUM 1000 UNITS: 1000 INJECTION, SOLUTION INTRAVENOUS; SUBCUTANEOUS at 20:41

## 2018-05-20 RX ADMIN — PROPOFOL 100 MG: 10 INJECTION, EMULSION INTRAVENOUS at 23:30

## 2018-05-20 RX ADMIN — METOPROLOL TARTRATE 5 MG: 5 INJECTION, SOLUTION INTRAVENOUS at 20:39

## 2018-05-20 RX ADMIN — ASPIRIN 81 MG 324 MG: 81 TABLET ORAL at 19:31

## 2018-05-20 RX ADMIN — METOPROLOL TARTRATE 25 MG: 25 TABLET ORAL at 22:04

## 2018-05-20 RX ADMIN — IPRATROPIUM BROMIDE AND ALBUTEROL SULFATE 3 ML: .5; 3 SOLUTION RESPIRATORY (INHALATION) at 22:38

## 2018-05-20 RX ADMIN — FUROSEMIDE 40 MG: 10 INJECTION, SOLUTION INTRAVENOUS at 22:55

## 2018-05-20 RX ADMIN — FUROSEMIDE 20 MG: 10 INJECTION, SOLUTION INTRAVENOUS at 19:41

## 2018-05-20 RX ADMIN — FERROUS SULFATE TAB 325 MG (65 MG ELEMENTAL FE) 325 MG: 325 (65 FE) TAB at 22:04

## 2018-05-20 RX ADMIN — TICAGRELOR 180 MG: 90 TABLET ORAL at 20:54

## 2018-05-20 RX ADMIN — MIDAZOLAM HYDROCHLORIDE 1 MG: 1 INJECTION, SOLUTION INTRAMUSCULAR; INTRAVENOUS at 20:10

## 2018-05-20 RX ADMIN — SODIUM CHLORIDE 75 ML/HR: 900 INJECTION, SOLUTION INTRAVENOUS at 22:10

## 2018-05-20 RX ADMIN — FENTANYL CITRATE 25 MCG: 50 INJECTION INTRAMUSCULAR; INTRAVENOUS at 20:25

## 2018-05-20 NOTE — IP AVS SNAPSHOT
Johnkuldeep Greg 
 
 
 [address] [facility] [address] 
[phone] Patient: Kayla Burgos MRN: [id number] :1950 About your hospitalization You were admitted on:  May 20, 2018 You last received care in the:  [facility] 2 CV INTNSV CARE You were discharged on:  May 23, 2018 Why you were hospitalized Your primary diagnosis was:  Stemi (St Elevation Myocardial Infarction) (Hcc) Your diagnoses also included:  Copd (Chronic Obstructive Pulmonary Disease) (Hcc), Essential Hypertension, Diabetes Mellitus Type 2, Insulin Dependent (Hcc), Cardiomyopathy, Dilated (Hcc), Acute Pulmonary Edema (Hcc) Follow-up Information Follow up With Details Comments Contact Info Joe Stone MD On 2018 @ 10:00 a.m. w/ Dr. Adam Navarrete pt will also see Dr. Charmayne Bulla the cardiologist @ [facility] per [facility] @ [facility]. [place] [address] [place] [id number] 
[phone] None   None (395) Patient stated that they have no PCP Discharge Orders None A check jitendra indicates which time of day the medication should be taken. My Medications START taking these medications Instructions Each Dose to Equal  
 Morning Noon Evening Bedtime  
 aspirin 81 mg chewable tablet Start taking on:  2018 Your last dose was: Your next dose is: Take 1 Tab by mouth daily. 81 mg  
    
   
   
   
  
 atorvastatin 20 mg tablet Commonly known as:  LIPITOR Your last dose was: Your next dose is: Take 1 Tab by mouth nightly. 20 mg  
    
   
   
   
  
 carvedilol 6.25 mg tablet Commonly known as:  Sidney Toan Your last dose was: Your next dose is: Take 1 Tab by mouth two (2) times daily (with meals). 6.25 mg  
    
   
   
   
  
 oxyCODONE-acetaminophen 5-325 mg per tablet Commonly known as:  PERCOCET Your last dose was: Your next dose is: Take 1 Tab by mouth every six (6) hours as needed. Max Daily Amount: 4 Tabs. 1 Tab  
    
   
   
   
  
 ticagrelor 90 mg tablet Commonly known as:  Nikita [employer] Your last dose was: Your next dose is: Take 1 Tab by mouth two (2) times a day. 90 mg CONTINUE taking these medications Instructions Each Dose to Equal  
 Morning Noon Evening Bedtime * albuterol 2.5 mg /3 mL (0.083 %) nebulizer solution Commonly known as:  PROVENTIL VENTOLIN Your last dose was: Your next dose is:    
   
   
 3 mL by Nebulization route every four (4) hours as needed for Wheezing. 2.5 mg  
    
   
   
   
  
 * albuterol 90 mcg/actuation inhaler Commonly known as:  PROVENTIL HFA, VENTOLIN HFA, PROAIR HFA Your last dose was: Your next dose is: Take 2 Puffs by inhalation every four (4) hours as needed for Wheezing. 2 Puff  
    
   
   
   
  
 desloratadine 5 mg tablet Commonly known as:  CLARINEX Your last dose was: Your next dose is: Take 1 Tab by mouth daily as needed for Allergies. 5 mg  
    
   
   
   
  
 esomeprazole 40 mg capsule Commonly known as:  NexIUM Your last dose was: Your next dose is: Take 1 Cap by mouth daily as needed. For reflux 40 mg  
    
   
   
   
  
 ferrous sulfate 325 mg (65 mg iron) tablet Your last dose was: Your next dose is: Take 1 Tab by mouth two (2) times a day. Indications: IRON DEFICIENCY ANEMIA  
 325 mg  
    
   
   
   
  
 insulin detemir U-100 100 unit/mL injection Commonly known as:  LEVEMIR U-100 INSULIN Your last dose was: Your next dose is:    
   
   
 15 units daily for diabetes  
     
   
   
   
  
 magnesium oxide 400 mg tablet Commonly known as:  MAG-OX Your last dose was: Your next dose is: Take 1 Tab by mouth daily. For low magnesium 400 mg  
    
   
   
   
  
 mometasone 50 mcg/actuation nasal spray Commonly known as:  Atilio Chiu Your last dose was: Your next dose is: 2 Sprays by Both Nostrils route daily as needed. For allergies 2 Spray  
    
   
   
   
  
 omega-3 acid ethyl esters 1 gram capsule Commonly known as:  Brittnee  Your last dose was: Your next dose is: Take 2 Caps by mouth daily (with breakfast). 2 g  
    
   
   
   
  
 umeclidinium-vilanterol 62.5-25 mcg/actuation inhaler Commonly known as:  Laila Galvez Your last dose was: Your next dose is: Take 1 Puff by inhalation daily. For COPD  
 1 Puff * Notice: This list has 2 medication(s) that are the same as other medications prescribed for you. Read the directions carefully, and ask your doctor or other care provider to review them with you. STOP taking these medications   
 amLODIPine-atorvastatin 10-80 mg per tablet Commonly known as:  CADUET  
   
  
 lisinopril 20 mg tablet Commonly known as:  PRINIVIL, ZESTRIL  
   
  
 traMADol 50 mg tablet Commonly known as:  ULTRAM  
   
  
  
  
Where to Get Your Medications Information on where to get these meds will be given to you by the nurse or doctor. ! Ask your nurse or doctor about these medications  
  aspirin 81 mg chewable tablet  
 atorvastatin 20 mg tablet  
 carvedilol 6.25 mg tablet  
 oxyCODONE-acetaminophen 5-325 mg per tablet  
 ticagrelor 90 mg tablet Opioid Education Prescription Opioids: What You Need to Know: 
 
 
*Activity should be limited for the next 48 hours. Climb stairs as little as possible and avoid any stooping, bending or strenuous activity for 48 hours. No heavy lifting (anything over 10 pounds) for three days. *Do not drive for 48 hours. *You may resume your usual diet. Drink more fluids than usual. 
 
*Have a responsible person drive you home and stay with you for at least 24 hours after your heart catheterization/angiography. *You may remove the bandage from your groins in 24 hours. You may shower in 24 hours. No tub baths, hot tubs or swimming for one week. Do not place any lotions, creams, powders, ointments over the puncture site for one week. You may place a clean band-aid over the puncture site each day for 5 days. Change this daily. Low Sodium Diet (2,000 Milligram): Care Instructions Your Care Instructions Too much sodium causes your body to hold on to extra water. This can raise your blood pressure and force your heart and kidneys to work harder. In very serious cases, this could cause you to be put in the hospital. It might even be life-threatening. By limiting sodium, you will feel better and lower your risk of serious problems. The most common source of sodium is salt. People get most of the salt in their diet from canned, prepared, and packaged foods. Fast food and restaurant meals also are very high in sodium. Your doctor will probably limit your sodium to less than 2,000 milligrams (mg) a day. This limit counts all the sodium in prepared and packaged foods and any salt you add to your food. Follow-up care is a key part of your treatment and safety. Be sure to make and go to all appointments, and call your doctor if you are having problems. It's also a good idea to know your test results and keep a list of the medicines you take. How can you care for yourself at home? Read food labels · Read labels on cans and food packages. The labels tell you how much sodium is in each serving. Make sure that you look at the serving size. If you eat more than the serving size, you have eaten more sodium. · Food labels also tell you the Percent Daily Value for sodium. Choose products with low Percent Daily Values for sodium. · Be aware that sodium can come in forms other than salt, including monosodium glutamate (MSG), sodium citrate, and sodium bicarbonate (baking soda). MSG is often added to Asian food. When you eat out, you can sometimes ask for food without MSG or added salt. Buy low-sodium foods · Buy foods that are labeled \"unsalted\" (no salt added), \"sodium-free\" (less than 5 mg of sodium per serving), or \"low-sodium\" (less than 140 mg of sodium per serving). Foods labeled \"reduced-sodium\" and \"light sodium\" may still have too much sodium. Be sure to read the label to see how much sodium you are getting. · Buy fresh vegetables, or frozen vegetables without added sauces. Buy low-sodium versions of canned vegetables, soups, and other canned goods. Prepare low-sodium meals · Cut back on the amount of salt you use in cooking. This will help you adjust to the taste. Do not add salt after cooking. One teaspoon of salt has about 2,300 mg of sodium. · Take the salt shaker off the table. · Flavor your food with garlic, lemon juice, onion, vinegar, herbs, and spices. Do not use soy sauce, lite soy sauce, steak sauce, onion salt, garlic salt, celery salt, mustard, or ketchup on your food. · Use low-sodium salad dressings, sauces, and ketchup. Or make your own salad dressings and sauces without adding salt. · Use less salt (or none) when recipes call for it. You can often use half the salt a recipe calls for without losing flavor. Other foods such as rice, pasta, and grains do not need added salt. · Rinse canned vegetables, and cook them in fresh water. This removes some-but not all-of the salt. · Avoid water that is naturally high in sodium or that has been treated with water softeners, which add sodium. Call your local water company to find out the sodium content of your water supply. If you buy bottled water, read the label and choose a sodium-free brand. Avoid high-sodium foods · Avoid eating: ¨ Smoked, cured, salted, and canned meat, fish, and poultry. ¨ Ham, pennington, hot dogs, and luncheon meats. ¨ Regular, hard, and processed cheese and regular peanut butter. ¨ Crackers with salted tops, and other salted snack foods such as pretzels, chips, and salted popcorn. ¨ Frozen prepared meals, unless labeled low-sodium. ¨ Canned and dried soups, broths, and bouillon, unless labeled sodium-free or low-sodium. ¨ Canned vegetables, unless labeled sodium-free or low-sodium. ¨ Western Kylie fries, pizza, tacos, and other fast foods. ¨ Pickles, olives, ketchup, and other condiments, especially soy sauce, unless labeled sodium-free or low-sodium. Where can you learn more? Go to http://jason-charity.info/. Enter N850 in the search box to learn more about \"Low Sodium Diet (2,000 Milligram): Care Instructions. \" Current as of: May 12, 2017 Content Version: 11.4 © 1149-7976 [employer]. Care instructions adapted under license by [employer] (which disclaims liability or warranty for this information). If you have questions about a medical condition or this instruction, always ask your healthcare professional. [address] any warranty or liability for your use of this information. Learning About Benefits From Quitting Smoking How does quitting smoking make you healthier? If you're thinking about quitting smoking, you may have a few reasons to be smoke-free. Your health may be one of them. · When you quit smoking, you lower your risks for cancer, lung disease, heart attack, stroke, blood vessel disease, and blindness from macular degeneration. · When you're smoke-free, you get sick less often, and you heal faster. You are less likely to get colds, flu, bronchitis, and pneumonia. · As a nonsmoker, you may find that your mood is better and you are less stressed. When and how will you feel healthier? Quitting has real health benefits that start from day 1 of being smoke-free. And the longer you stay smoke-free, the healthier you get and the better you feel. The first hours · After just 20 minutes, your blood pressure and heart rate go down. That means there's less stress on your heart and blood vessels. · Within 12 hours, the level of carbon monoxide in your blood drops back to normal. That makes room for more oxygen. With more oxygen in your body, you may notice that you have more energy than when you smoked. After 2 weeks · Your lungs start to work better. · Your risk of heart attack starts to drop. After 1 month · When your lungs are clear, you cough less and breathe deeper, so it's easier to be active. · Your sense of taste and smell return. That means you can enjoy food more than you have since you started smoking. Over the years · After 1 year, your risk of heart disease is half what it would be if you kept smoking. · After 5 years, your risk of stroke starts to shrink. Within a few years after that, it's about the same as if you'd never smoked. · After 10 years, your risk of dying from lung cancer is cut by about half. And your risk for many other types of cancer is lower too. How would quitting help others in your life? When you quit smoking, you improve the health of everyone who now breathes in your smoke. · Their heart, lung, and cancer risks drop, much like yours. · They are sick less. For babies and small children, living smoke-free means they're less likely to have ear infections, pneumonia, and bronchitis. · If you're a woman who is or will be pregnant someday, quitting smoking means a healthier . · Children who are close to you are less likely to become adult smokers. Where can you learn more? Go to http://jason-charity.info/. Enter [id number] [id number] [id number] in the search box to learn more about \"Learning About Benefits From Quitting Smoking. \" Current as of: 2017 Content Version: 11.4 © 6610-4530 [employer]. Care instructions adapted under license by [employer] (which disclaims liability or warranty for this information). If you have questions about a medical condition or this instruction, always ask your healthcare professional. [place] [address] any warranty or liability for your use of this information. Carvedilol (By mouth) Carvedilol ([id number]) Treats high blood pressure and heart failure. Also reduces the risk of death after a heart attack. This medicine is a beta-blocker. Brand Name(s): Coreg, Coreg CR There may be other brand names for this medicine. When This Medicine Should Not Be Used: This medicine is not right for everyone. Do not use it if you had an allergic reaction to carvedilol, or if you have asthma, severe liver disease, or certain heart problems. Ask your doctor about these heart problems. How to Use This Medicine:  
Long Acting Capsule, Tablet · Take your medicine as directed. Your dose may need to be changed several times to find what works best for you. · It is best to take this medicine with food or milk. · Extended-release capsule instructions: ¨ Take the capsule in the morning with food. ¨ Swallow the capsule whole. Do not crush or chew it. ¨ If you cannot swallow the capsule, you may open it and sprinkle the medicine over a spoonful of applesauce. Swallow the applesauce right away. · Read and follow the patient instructions that come with this medicine. Talk to your doctor or pharmacist if you have any questions. · Store the medicine in a closed container at room temperature, away from heat, moisture, and direct light. · Missed dose: Take a dose as soon as you remember. If it is almost time for your next dose, wait until then and take a regular dose. Do not take extra medicine to make up for a missed dose. Drugs and Foods to Avoid: Ask your doctor or pharmacist before using any other medicine, including over-the-counter medicines, vitamins, and herbal products. · Some medicines can affect how carvedilol works. Tell your doctor if you are using amiodarone, clonidine, diltiazem, cyclosporine, digoxin, fluconazole, reserpine, rifampin, verapamil, or an MAO inhibitor (MAOI). Warnings While Using This Medicine: · Tell your doctor if you are pregnant or breastfeeding, or if you have kidney disease, liver disease, bradycardia (slow heartbeat), coronary artery disease, circulation problems, edema (fluid retention or swelling), heart or blood vessel problems, low blood pressure, lung problems (such as bronchitis or emphysema), an overactive thyroid, pheochromocytoma, or frequent chest pains. Tell your doctor if you have a history of severe allergic reactions or if you are scheduled to have surgery. · This medicine may cause the following problems:  
¨ Changes to your blood sugar level (if you have diabetes, report any blood sugar level changes to your doctor) ¨ Fewer tears than usual in contact lens wearers ¨ An eye problem called Intraoperative Floppy Iris Syndrome during cataract surgery · This medicine may make you dizzy or drowsy. Do not drive, use machines, or do anything else that could be dangerous if you are not alert. · Do not stop using this medicine suddenly. Your doctor will need to slowly decrease your dose before you stop it completely. · Keep all medicine out of the reach of children. Never share your medicine with anyone. Possible Side Effects While Using This Medicine:  
Call your doctor right away if you notice any of these side effects: · Allergic reaction: Itching or hives, swelling in your face or hands, swelling or tingling in your mouth or throat, chest tightness, trouble breathing · Change in how much or how often you urinate · Chest pain that may spread to your arms, jaw, back, or neck, trouble breathing, nausea, unusual sweating, faintness · Leg pain when you walk, legs and feet that feel cold or numb · Lightheadedness, dizziness, or fainting · Rapid weight gain, swelling in your hands, ankles, or feet · Shaking, trembling, sweating, hunger, confusion · Slow, fast, or uneven heartbeat · Unusual bleeding or bruising · Wheezing or trouble breathing If you notice these less serious side effects, talk with your doctor: · Diarrhea · Trouble having sex · Unusual tiredness or weakness If you notice other side effects that you think are caused by this medicine, tell your doctor. Call your doctor for medical advice about side effects. You may report side effects to FDA at 2-686-QHV-3064 © 2017 [facility] Information is for End User's use only and may not be sold, redistributed or otherwise used for commercial purposes. The above information is an  only. It is not intended as medical advice for individual conditions or treatments. Talk to your doctor, nurse or pharmacist before following any medical regimen to see if it is safe and effective for you. Ticagrelor (By mouth) Ticagrelor ([id number]) Helps prevent stroke, heart attack, and other heart problems. This medicine is a blood thinner. Brand Name(s): Brilinta There may be other brand names for this medicine. When This Medicine Should Not Be Used: This medicine is not right for everyone. Do not use it if you had an allergic reaction to ticagrelor, or if you have bleeding problems (such as a bleeding stomach ulcer) or a history of bleeding in your brain. How to Use This Medicine:  
Tablet · Your doctor will tell you how much medicine to use. Do not use more than directed. Take this medicine at the same time every day. · Your doctor may tell you to take aspirin with this medicine. Do not use more than 100 milligrams of aspirin per day. Check the labels of other medicines to make sure they do not contain aspirin. · If you cannot swallow the tablet, you may do this: ¨ Crush the tablet and mix it in a glass of water. Drink it right away. Rinse the glass with more water and drink that too, so you get all the medicine. ¨ You may give the tablet and water mixture through a nasogastric tube. Flush the tube with more water so you receive all the medicine. · This medicine should come with a Medication Guide. Ask your pharmacist for a copy if you do not have one. · Missed dose: Skip the missed dose and take your next dose as usual. Do not take extra medicine to make up for a missed dose. · Store the medicine in a closed container at room temperature, away from heat, moisture, and direct light. Drugs and Foods to Avoid: Ask your doctor or pharmacist before using any other medicine, including over-the-counter medicines, vitamins, and herbal products. · Some medicines can affect how ticagrelor works. Tell your doctor if you are using any of the following: ¨ Atazanavir, carbamazepine, clarithromycin, digoxin, indinavir, itraconazole, ketoconazole, lovastatin, nefazodone, nelfinavir, phenobarbital, phenytoin, rifampin, ritonavir, saquinavir, simvastatin, telithromycin, or voriconazole ¨ Blood thinner (including warfarin or heparin) ¨ NSAID pain or arthritis medicine (including celecoxib, diclofenac, ibuprofen, naproxen) Warnings While Using This Medicine: · Tell your doctor if you are pregnant or breastfeeding, or if you have liver disease, heart rhythm problems (including slow heartbeat), lung or breathing problems (such as asthma or COPD), or a history of bleeding problems. · This medicine may cause you to bleed and bruise more easily, and it may take longer than usual for bleeding to stop. Be careful to avoid injuries. · Do not stop using this medicine unless your doctor tells you to. To stop it may increase your risk of a heart attack, blood clot, or other serious problem. · Tell any doctor or dentist who treats you that you are using this medicine. With your doctor's permission, you may need to stop using this medicine several days before you have surgery to reduce the risk of bleeding problems. Follow your doctor's instructions carefully. · Your doctor will do lab tests at regular visits to check on the effects of this medicine. Keep all appointments. · Keep all medicine out of the reach of children. Never share your medicine with anyone. Possible Side Effects While Using This Medicine:  
Call your doctor right away if you notice any of these side effects: · Allergic reaction: Itching or hives, swelling in your face or hands, swelling or tingling in your mouth or throat, chest tightness, trouble breathing · Bloody or black, tarry stools, red or dark brown urine · Fast, slow, or pounding heartbeat · Trouble breathing · Unusual bleeding, bruising, or weakness · Vomiting of blood or material that looks like coffee grounds If you notice other side effects that you think are caused by this medicine, tell your doctor. Call your doctor for medical advice about side effects. You may report side effects to FDA at 4-366-FDA-3400 © 2017 [address] Information is for End User's use only and may not be sold, redistributed or otherwise used for commercial purposes. The above information is an  only. It is not intended as medical advice for individual conditions or treatments. Talk to your doctor, nurse or pharmacist before following any medical regimen to see if it is safe and effective for you. Introducing [facility]! [employer] introduces [employer] patient portal. Now you can access parts of your medical record, email your doctor's office, and request medication refills online. 1. In your internet browser, go to [email or web address] 2. Click on the First Time User? Click Here link in the Sign In box. You will see the New Member Sign Up page. 3. Enter your Rontal Applications Access Code exactly as it appears below. You will not need to use this code after youve completed the sign-up process. If you do not sign up before the expiration date, you must request a new code. · [employer] Access Code: [email or web address] Expires: 8/18/2018  7:44 PM 
 
4. Enter the last four digits of your Social Security Number (xxxx) and Date of Birth (mm/dd/yyyy) as indicated and click Submit. You will be taken to the next sign-up page. 5. Create a Rontal Applications ID. This will be your [employer] Applications login ID and cannot be changed, so think of one that is secure and easy to remember. 6. Create a [employer] password. You can change your password at any time. 7. Enter your Password Reset Question and Answer. This can be used at a later time if you forget your password. 8. Enter your e-mail address. You will receive e-mail notification when new information is available in [address] 9. Click Sign Up. You can now view and download portions of your medical record. 10. Click the Download Summary menu link to download a portable copy of your medical information.  
 
If you have questions, please visit the Frequently Asked Questions section of the Expert TA. Remember, MyChart is NOT to be used for urgent needs. For medical emergencies, dial 911. Now available from your iPhone and Android! Introducing Jimmy York As a Danielson Frausto [facility] patient, I wanted to make you aware of our electronic visit tool called Jimmy York. [employer]Star [employer] allows you to connect within minutes with a medical provider 24 hours a day, seven days a week via a mobile device or tablet or logging into a secure website from your computer. You can access [facility] from anywhere in the [place]. A virtual visit might be right for you when you have a simple condition and feel like you just dont want to get out of bed, or cant get away from work for an appointment, when your regular [facility] provider is not available (evenings, weekends or holidays), or when youre out of town and need minor care. Electronic visits cost only $49 and if the [employer] provider determines a prescription is needed to treat your condition, one can be electronically transmitted to a nearby pharmacy*. Please take a moment to enroll today if you have not already done so. The enrollment process is free and takes just a few minutes. To enroll, please download the [employer] satya to your tablet or phone, or visit [email or web address] to enroll on your computer. And, as an [address] [place] [address] patient with a [employer] account, the results of your visits will be scanned into your electronic medical record and your primary care provider will be able to view the scanned results. We urge you to continue to see your regular [facility] provider for your ongoing medical care. And while your primary care provider may not be the one available when you seek a [facility] virtual visit, the peace of mind you get from getting a real diagnosis real time can be priceless. For more information on [facility], view our Frequently Asked Questions (FAQs) at [email or web address]. Sincerely, 
 
Robert Foy MD 
Chief Medical Officer [employer] *:  certain medications cannot be prescribed via [facility] Unresulted Labs-Please follow up with your PCP about these lab tests Order Current Status BENZODIAZEPINE, UR, CONFIRM In process OPIATES, UR In process Providers Seen During Your Hospitalization Provider Specialty Primary office phone Fany Eddy MD Emergency Medicine [phone] Leatha Wilson MD Hospitalist [phone] Viktoria Ellis MD Internal Medicine [phone] Chantelle Ahuja MD Family Practice [phone] Your Primary Care Physician (PCP) Primary Care Physician Office Phone Office Fax NONE ** None ** ** None ** You are allergic to the following No active allergies Recent Documentation Height Weight BMI OB Status Smoking Status 1.626 m 96.6 kg 36.56 kg/m2 Menopause Former Smoker Emergency Contacts Name Discharge Info Relation Home Work Mobile Cubicle [address] CAREGIVER [3] Spouse [3] [phone] Patient Belongings The following personal items are in your possession at time of discharge: 
  Dental Appliances: None  Visual Aid: None      Home Medications: None   Jewelry: Ring (3)  Clothing: Footwear, Pants, Shirt    Other Valuables: Cell Phone, With patient Please provide this summary of care documentation to your next provider. Signatures-by signing, you are acknowledging that this After Visit Summary has been reviewed with you and you have received a copy. Patient Signature:  ____________________________________________________________ Date:  ____________________________________________________________  
  
Jennifer Darden  Provider Signature: ____________________________________________________________ Date:  ____________________________________________________________

## 2018-05-20 NOTE — IP AVS SNAPSHOT
[address] [address] 
[phone] Patient: Dylan Flower MRN: [id number] :1950 A check jitendra indicates which time of day the medication should be taken. My Medications START taking these medications Instructions Each Dose to Equal  
 Morning Noon Evening Bedtime  
 aspirin 81 mg chewable tablet Start taking on:  2018 Your last dose was: Your next dose is: Take 1 Tab by mouth daily. 81 mg  
    
   
   
   
  
 atorvastatin 20 mg tablet Commonly known as:  LIPITOR Your last dose was: Your next dose is: Take 1 Tab by mouth nightly. 20 mg  
    
   
   
   
  
 carvedilol 6.25 mg tablet Commonly known as:  Erica Ni Your last dose was: Your next dose is: Take 1 Tab by mouth two (2) times daily (with meals). 6.25 mg  
    
   
   
   
  
 oxyCODONE-acetaminophen 5-325 mg per tablet Commonly known as:  PERCOCET Your last dose was: Your next dose is: Take 1 Tab by mouth every six (6) hours as needed. Max Daily Amount: 4 Tabs. 1 Tab  
    
   
   
   
  
 ticagrelor 90 mg tablet Commonly known as:  [employer]ita[employer] Your last dose was: Your next dose is: Take 1 Tab by mouth two (2) times a day. 90 mg CONTINUE taking these medications Instructions Each Dose to Equal  
 Morning Noon Evening Bedtime * albuterol 2.5 mg /3 mL (0.083 %) nebulizer solution Commonly known as:  PROVENTIL VENTOLIN Your last dose was: Your next dose is:    
   
   
 3 mL by Nebulization route every four (4) hours as needed for Wheezing. 2.5 mg  
    
   
   
   
  
 * albuterol 90 mcg/actuation inhaler Commonly known as:  PROVENTIL HFA, VENTOLIN HFA, PROAIR HFA Your last dose was: Your next dose is: Take 2 Puffs by inhalation every four (4) hours as needed for Wheezing. 2 Puff  
    
   
   
   
  
 desloratadine 5 mg tablet Commonly known as:  CLARINEX Your last dose was: Your next dose is: Take 1 Tab by mouth daily as needed for Allergies. 5 mg  
    
   
   
   
  
 esomeprazole 40 mg capsule Commonly known as:  NexIUM Your last dose was: Your next dose is: Take 1 Cap by mouth daily as needed. For reflux 40 mg  
    
   
   
   
  
 ferrous sulfate 325 mg (65 mg iron) tablet Your last dose was: Your next dose is: Take 1 Tab by mouth two (2) times a day. Indications: IRON DEFICIENCY ANEMIA  
 325 mg  
    
   
   
   
  
 insulin detemir U-100 100 unit/mL injection Commonly known as:  LEVEMIR U-100 INSULIN Your last dose was: Your next dose is:    
   
   
 15 units daily for diabetes  
     
   
   
   
  
 magnesium oxide 400 mg tablet Commonly known as:  MAG-OX Your last dose was: Your next dose is: Take 1 Tab by mouth daily. For low magnesium 400 mg  
    
   
   
   
  
 mometasone 50 mcg/actuation nasal spray Commonly known as:  Aguilar Course Your last dose was: Your next dose is: 2 Sprays by Both Nostrils route daily as needed. For allergies 2 Spray  
    
   
   
   
  
 omega-3 acid ethyl esters 1 gram capsule Commonly known as:  Harish Zavala Your last dose was: Your next dose is: Take 2 Caps by mouth daily (with breakfast). 2 g  
    
   
   
   
  
 umeclidinium-vilanterol 62.5-25 mcg/actuation inhaler Commonly known as:  Hao Rizzo Your last dose was: Your next dose is: Take 1 Puff by inhalation daily. For COPD  
 1 Puff * Notice:   This list has 2 medication(s) that are the same as other medications prescribed for you. Read the directions carefully, and ask your doctor or other care provider to review them with you. STOP taking these medications   
 amLODIPine-atorvastatin 10-80 mg per tablet Commonly known as:  CADUET  
   
  
 lisinopril 20 mg tablet Commonly known as:  PRINIVIL, ZESTRIL  
   
  
 traMADol 50 mg tablet Commonly known as:  ULTRAM  
   
  
  
  
Where to Get Your Medications Information on where to get these meds will be given to you by the nurse or doctor. ! Ask your nurse or doctor about these medications  
  aspirin 81 mg chewable tablet  
 atorvastatin 20 mg tablet  
 carvedilol 6.25 mg tablet  
 oxyCODONE-acetaminophen 5-325 mg per tablet  
 ticagrelor 90 mg tablet

## 2018-05-20 NOTE — ED TRIAGE NOTES
Pt states she started having right arm pain 2 days ago, was watching TV tonight and began havign severe chest apin with SOB. Pt has hx of diabetes, COPD, HTN, pt arrived in tears from pain, pain 10/10 constant, given 1 nitro, pain decreased to 7, second nitro pain decreased to 5 3rd nitro releived CP pt still c/o right arm pain, cath lab alerted, code stemi called, pt prepped.

## 2018-05-21 ENCOUNTER — APPOINTMENT (OUTPATIENT)
Dept: GENERAL RADIOLOGY | Age: 68
DRG: 246 | End: 2018-05-21
Attending: INTERNAL MEDICINE
Payer: MEDICARE

## 2018-05-21 LAB
ACT BLD: 235 SECS (ref 79–138)
ACT BLD: 235 SECS (ref 79–138)
ALBUMIN SERPL-MCNC: 3.4 G/DL (ref 3.4–5)
ALBUMIN/GLOB SERPL: 0.9 {RATIO} (ref 0.8–1.7)
ALP SERPL-CCNC: 114 U/L (ref 45–117)
ALT SERPL-CCNC: 76 U/L (ref 13–56)
ANION GAP SERPL CALC-SCNC: 10 MMOL/L (ref 3–18)
ANION GAP SERPL CALC-SCNC: 10 MMOL/L (ref 3–18)
ANION GAP SERPL CALC-SCNC: 11 MMOL/L (ref 3–18)
ANION GAP SERPL CALC-SCNC: 12 MMOL/L (ref 3–18)
ANION GAP SERPL CALC-SCNC: 12 MMOL/L (ref 3–18)
ARTERIAL PATENCY WRIST A: NO
ARTERIAL PATENCY WRIST A: NO
ARTERIAL PATENCY WRIST A: YES
AST SERPL-CCNC: 560 U/L (ref 15–37)
ATRIAL RATE: 101 BPM
ATRIAL RATE: 105 BPM
ATRIAL RATE: 78 BPM
ATRIAL RATE: 82 BPM
ATRIAL RATE: 89 BPM
ATRIAL RATE: 96 BPM
BASE DEFICIT BLD-SCNC: 10 MMOL/L
BASE DEFICIT BLD-SCNC: 6 MMOL/L
BASE DEFICIT BLD-SCNC: 9 MMOL/L
BDY SITE: ABNORMAL
BILIRUB DIRECT SERPL-MCNC: <0.1 MG/DL (ref 0–0.2)
BILIRUB SERPL-MCNC: 0.3 MG/DL (ref 0.2–1)
BODY TEMPERATURE: 37
BUN SERPL-MCNC: 30 MG/DL (ref 7–18)
BUN SERPL-MCNC: 31 MG/DL (ref 7–18)
BUN SERPL-MCNC: 32 MG/DL (ref 7–18)
BUN SERPL-MCNC: 35 MG/DL (ref 7–18)
BUN SERPL-MCNC: 38 MG/DL (ref 7–18)
BUN/CREAT SERPL: 14 (ref 12–20)
BUN/CREAT SERPL: 15 (ref 12–20)
BUN/CREAT SERPL: 16 (ref 12–20)
BUN/CREAT SERPL: 20 (ref 12–20)
BUN/CREAT SERPL: 21 (ref 12–20)
CALCIUM SERPL-MCNC: 8 MG/DL (ref 8.5–10.1)
CALCIUM SERPL-MCNC: 8.2 MG/DL (ref 8.5–10.1)
CALCIUM SERPL-MCNC: 8.3 MG/DL (ref 8.5–10.1)
CALCIUM SERPL-MCNC: 8.6 MG/DL (ref 8.5–10.1)
CALCIUM SERPL-MCNC: 8.7 MG/DL (ref 8.5–10.1)
CALCULATED P AXIS, ECG09: 68 DEGREES
CALCULATED P AXIS, ECG09: 71 DEGREES
CALCULATED P AXIS, ECG09: 75 DEGREES
CALCULATED P AXIS, ECG09: 76 DEGREES
CALCULATED P AXIS, ECG09: 84 DEGREES
CALCULATED P AXIS, ECG09: 86 DEGREES
CALCULATED R AXIS, ECG10: -11 DEGREES
CALCULATED R AXIS, ECG10: -38 DEGREES
CALCULATED R AXIS, ECG10: 29 DEGREES
CALCULATED R AXIS, ECG10: 60 DEGREES
CALCULATED R AXIS, ECG10: 74 DEGREES
CALCULATED R AXIS, ECG10: 78 DEGREES
CALCULATED T AXIS, ECG11: -11 DEGREES
CALCULATED T AXIS, ECG11: -118 DEGREES
CALCULATED T AXIS, ECG11: 119 DEGREES
CALCULATED T AXIS, ECG11: 60 DEGREES
CALCULATED T AXIS, ECG11: 79 DEGREES
CALCULATED T AXIS, ECG11: 96 DEGREES
CHLORIDE SERPL-SCNC: 104 MMOL/L (ref 100–108)
CHLORIDE SERPL-SCNC: 105 MMOL/L (ref 100–108)
CHLORIDE SERPL-SCNC: 106 MMOL/L (ref 100–108)
CHOLEST SERPL-MCNC: 291 MG/DL
CK MB CFR SERPL CALC: 6 % (ref 0–4)
CK MB SERPL-MCNC: 10.3 NG/ML (ref 5–25)
CK SERPL-CCNC: 173 U/L (ref 26–192)
CO2 SERPL-SCNC: 18 MMOL/L (ref 21–32)
CO2 SERPL-SCNC: 20 MMOL/L (ref 21–32)
CO2 SERPL-SCNC: 21 MMOL/L (ref 21–32)
CO2 SERPL-SCNC: 23 MMOL/L (ref 21–32)
CO2 SERPL-SCNC: 23 MMOL/L (ref 21–32)
CREAT SERPL-MCNC: 1.73 MG/DL (ref 0.6–1.3)
CREAT SERPL-MCNC: 1.84 MG/DL (ref 0.6–1.3)
CREAT SERPL-MCNC: 1.95 MG/DL (ref 0.6–1.3)
CREAT SERPL-MCNC: 2.06 MG/DL (ref 0.6–1.3)
CREAT SERPL-MCNC: 2.08 MG/DL (ref 0.6–1.3)
DIAGNOSIS, 93000: NORMAL
ERYTHROCYTE [DISTWIDTH] IN BLOOD BY AUTOMATED COUNT: 16.4 % (ref 11.6–14.5)
ERYTHROCYTE [DISTWIDTH] IN BLOOD BY AUTOMATED COUNT: 16.5 % (ref 11.6–14.5)
ERYTHROCYTE [DISTWIDTH] IN BLOOD BY AUTOMATED COUNT: 16.6 % (ref 11.6–14.5)
EST. AVERAGE GLUCOSE BLD GHB EST-MCNC: 143 MG/DL
GAS FLOW.O2 O2 DELIVERY SYS: ABNORMAL L/MIN
GAS FLOW.O2 SETTING OXYMISER: 16 BPM
GLOBULIN SER CALC-MCNC: 3.9 G/DL (ref 2–4)
GLUCOSE BLD STRIP.AUTO-MCNC: 149 MG/DL (ref 70–110)
GLUCOSE BLD STRIP.AUTO-MCNC: 175 MG/DL (ref 70–110)
GLUCOSE BLD STRIP.AUTO-MCNC: 179 MG/DL (ref 70–110)
GLUCOSE BLD STRIP.AUTO-MCNC: 182 MG/DL (ref 70–110)
GLUCOSE SERPL-MCNC: 150 MG/DL (ref 74–99)
GLUCOSE SERPL-MCNC: 165 MG/DL (ref 74–99)
GLUCOSE SERPL-MCNC: 182 MG/DL (ref 74–99)
GLUCOSE SERPL-MCNC: 252 MG/DL (ref 74–99)
GLUCOSE SERPL-MCNC: 366 MG/DL (ref 74–99)
HBA1C MFR BLD: 6.6 % (ref 4.2–5.6)
HCO3 BLD-SCNC: 17.2 MMOL/L (ref 22–26)
HCO3 BLD-SCNC: 18.7 MMOL/L (ref 22–26)
HCO3 BLD-SCNC: 20.4 MMOL/L (ref 22–26)
HCT VFR BLD AUTO: 36.9 % (ref 35–45)
HCT VFR BLD AUTO: 37.5 % (ref 35–45)
HCT VFR BLD AUTO: 37.5 % (ref 35–45)
HDLC SERPL-MCNC: 52 MG/DL (ref 40–60)
HDLC SERPL: 5.6 {RATIO} (ref 0–5)
HGB BLD-MCNC: 11.7 G/DL (ref 12–16)
HGB BLD-MCNC: 11.8 G/DL (ref 12–16)
HGB BLD-MCNC: 11.8 G/DL (ref 12–16)
LACTATE SERPL-SCNC: 1 MMOL/L (ref 0.4–2)
LACTATE SERPL-SCNC: 4.8 MMOL/L (ref 0.4–2)
LDLC SERPL CALC-MCNC: 212.6 MG/DL (ref 0–100)
LIPID PROFILE,FLP: ABNORMAL
MAGNESIUM SERPL-MCNC: 1.8 MG/DL (ref 1.6–2.6)
MAGNESIUM SERPL-MCNC: 2.3 MG/DL (ref 1.6–2.6)
MAGNESIUM SERPL-MCNC: 2.4 MG/DL (ref 1.6–2.6)
MAGNESIUM SERPL-MCNC: 2.7 MG/DL (ref 1.6–2.6)
MCH RBC QN AUTO: 27 PG (ref 24–34)
MCH RBC QN AUTO: 27.6 PG (ref 24–34)
MCH RBC QN AUTO: 27.9 PG (ref 24–34)
MCHC RBC AUTO-ENTMCNC: 31.2 G/DL (ref 31–37)
MCHC RBC AUTO-ENTMCNC: 31.5 G/DL (ref 31–37)
MCHC RBC AUTO-ENTMCNC: 32 G/DL (ref 31–37)
MCV RBC AUTO: 86.4 FL (ref 74–97)
MCV RBC AUTO: 87.2 FL (ref 74–97)
MCV RBC AUTO: 87.8 FL (ref 74–97)
O2/TOTAL GAS SETTING VFR VENT: 100 %
P-R INTERVAL, ECG05: 148 MS
P-R INTERVAL, ECG05: 166 MS
P-R INTERVAL, ECG05: 170 MS
P-R INTERVAL, ECG05: 172 MS
P-R INTERVAL, ECG05: 188 MS
P-R INTERVAL, ECG05: 190 MS
PCO2 BLD: 39.6 MMHG (ref 35–45)
PCO2 BLD: 41.3 MMHG (ref 35–45)
PCO2 BLD: 43.6 MMHG (ref 35–45)
PEEP RESPIRATORY: 8 CMH2O
PH BLD: 7.24 [PH] (ref 7.35–7.45)
PH BLD: 7.25 [PH] (ref 7.35–7.45)
PH BLD: 7.3 [PH] (ref 7.35–7.45)
PHOSPHATE SERPL-MCNC: 4 MG/DL (ref 2.5–4.9)
PHOSPHATE SERPL-MCNC: 4 MG/DL (ref 2.5–4.9)
PHOSPHATE SERPL-MCNC: 4.6 MG/DL (ref 2.5–4.9)
PHOSPHATE SERPL-MCNC: 4.7 MG/DL (ref 2.5–4.9)
PLATELET # BLD AUTO: 323 K/UL (ref 135–420)
PLATELET # BLD AUTO: 331 K/UL (ref 135–420)
PLATELET # BLD AUTO: 393 K/UL (ref 135–420)
PMV BLD AUTO: 10 FL (ref 9.2–11.8)
PMV BLD AUTO: 10.1 FL (ref 9.2–11.8)
PMV BLD AUTO: 9.8 FL (ref 9.2–11.8)
PO2 BLD: 123 MMHG (ref 80–100)
PO2 BLD: 123 MMHG (ref 80–100)
PO2 BLD: 137 MMHG (ref 80–100)
POTASSIUM SERPL-SCNC: 4.9 MMOL/L (ref 3.5–5.5)
POTASSIUM SERPL-SCNC: 4.9 MMOL/L (ref 3.5–5.5)
POTASSIUM SERPL-SCNC: 5.2 MMOL/L (ref 3.5–5.5)
POTASSIUM SERPL-SCNC: 5.3 MMOL/L (ref 3.5–5.5)
POTASSIUM SERPL-SCNC: 6 MMOL/L (ref 3.5–5.5)
POTASSIUM SERPL-SCNC: 6.3 MMOL/L (ref 3.5–5.5)
PROT SERPL-MCNC: 7.3 G/DL (ref 6.4–8.2)
Q-T INTERVAL, ECG07: 328 MS
Q-T INTERVAL, ECG07: 366 MS
Q-T INTERVAL, ECG07: 370 MS
Q-T INTERVAL, ECG07: 398 MS
Q-T INTERVAL, ECG07: 420 MS
Q-T INTERVAL, ECG07: 446 MS
QRS DURATION, ECG06: 106 MS
QRS DURATION, ECG06: 78 MS
QRS DURATION, ECG06: 84 MS
QRS DURATION, ECG06: 84 MS
QRS DURATION, ECG06: 90 MS
QRS DURATION, ECG06: 96 MS
QTC CALCULATION (BEZET), ECG08: 433 MS
QTC CALCULATION (BEZET), ECG08: 453 MS
QTC CALCULATION (BEZET), ECG08: 467 MS
QTC CALCULATION (BEZET), ECG08: 474 MS
QTC CALCULATION (BEZET), ECG08: 511 MS
QTC CALCULATION (BEZET), ECG08: 521 MS
RBC # BLD AUTO: 4.23 M/UL (ref 4.2–5.3)
RBC # BLD AUTO: 4.27 M/UL (ref 4.2–5.3)
RBC # BLD AUTO: 4.34 M/UL (ref 4.2–5.3)
SAO2 % BLD: 98 % (ref 92–97)
SAO2 % BLD: 98 % (ref 92–97)
SAO2 % BLD: 99 % (ref 92–97)
SERVICE CMNT-IMP: ABNORMAL
SODIUM SERPL-SCNC: 134 MMOL/L (ref 136–145)
SODIUM SERPL-SCNC: 137 MMOL/L (ref 136–145)
SODIUM SERPL-SCNC: 138 MMOL/L (ref 136–145)
SPECIMEN TYPE: ABNORMAL
TOTAL RESP. RATE, ITRR: 18
TOTAL RESP. RATE, ITRR: 22
TOTAL RESP. RATE, ITRR: 30
TRIGL SERPL-MCNC: 132 MG/DL (ref ?–150)
TROPONIN I SERPL-MCNC: 1.43 NG/ML (ref 0–0.04)
TSH SERPL DL<=0.05 MIU/L-ACNC: 1.01 UIU/ML (ref 0.36–3.74)
VENTILATION MODE VENT: ABNORMAL
VENTRICULAR RATE, ECG03: 101 BPM
VENTRICULAR RATE, ECG03: 105 BPM
VENTRICULAR RATE, ECG03: 78 BPM
VENTRICULAR RATE, ECG03: 82 BPM
VENTRICULAR RATE, ECG03: 89 BPM
VENTRICULAR RATE, ECG03: 96 BPM
VLDLC SERPL CALC-MCNC: 26.4 MG/DL
VOLUME CONTROL PLUS IVLCP: YES
VT SETTING VENT: 400 ML
VT SETTING VENT: 400 ML
VT SETTING VENT: 450 ML
WBC # BLD AUTO: 11.7 K/UL (ref 4.6–13.2)
WBC # BLD AUTO: 13.6 K/UL (ref 4.6–13.2)
WBC # BLD AUTO: 6.5 K/UL (ref 4.6–13.2)

## 2018-05-21 PROCEDURE — 74011000250 HC RX REV CODE- 250: Performed by: INTERNAL MEDICINE

## 2018-05-21 PROCEDURE — 84132 ASSAY OF SERUM POTASSIUM: CPT | Performed by: INTERNAL MEDICINE

## 2018-05-21 PROCEDURE — 84443 ASSAY THYROID STIM HORMONE: CPT | Performed by: PHYSICIAN ASSISTANT

## 2018-05-21 PROCEDURE — 74011250636 HC RX REV CODE- 250/636: Performed by: INTERNAL MEDICINE

## 2018-05-21 PROCEDURE — 74011250636 HC RX REV CODE- 250/636

## 2018-05-21 PROCEDURE — 94640 AIRWAY INHALATION TREATMENT: CPT

## 2018-05-21 PROCEDURE — 80361 OPIATES 1 OR MORE: CPT | Performed by: INTERNAL MEDICINE

## 2018-05-21 PROCEDURE — 80061 LIPID PANEL: CPT | Performed by: INTERNAL MEDICINE

## 2018-05-21 PROCEDURE — 80076 HEPATIC FUNCTION PANEL: CPT | Performed by: INTERNAL MEDICINE

## 2018-05-21 PROCEDURE — 80048 BASIC METABOLIC PNL TOTAL CA: CPT | Performed by: PHYSICIAN ASSISTANT

## 2018-05-21 PROCEDURE — 84100 ASSAY OF PHOSPHORUS: CPT | Performed by: INTERNAL MEDICINE

## 2018-05-21 PROCEDURE — 74011250637 HC RX REV CODE- 250/637: Performed by: PHYSICIAN ASSISTANT

## 2018-05-21 PROCEDURE — 74011636320 HC RX REV CODE- 636/320: Performed by: INTERNAL MEDICINE

## 2018-05-21 PROCEDURE — 74011250636 HC RX REV CODE- 250/636: Performed by: PHYSICIAN ASSISTANT

## 2018-05-21 PROCEDURE — 36600 WITHDRAWAL OF ARTERIAL BLOOD: CPT

## 2018-05-21 PROCEDURE — 74011636637 HC RX REV CODE- 636/637: Performed by: PHYSICIAN ASSISTANT

## 2018-05-21 PROCEDURE — 36415 COLL VENOUS BLD VENIPUNCTURE: CPT | Performed by: INTERNAL MEDICINE

## 2018-05-21 PROCEDURE — 74011250637 HC RX REV CODE- 250/637: Performed by: INTERNAL MEDICINE

## 2018-05-21 PROCEDURE — 83036 HEMOGLOBIN GLYCOSYLATED A1C: CPT | Performed by: INTERNAL MEDICINE

## 2018-05-21 PROCEDURE — 93005 ELECTROCARDIOGRAM TRACING: CPT

## 2018-05-21 PROCEDURE — 65620000000 HC RM CCU GENERAL

## 2018-05-21 PROCEDURE — 84100 ASSAY OF PHOSPHORUS: CPT | Performed by: PHYSICIAN ASSISTANT

## 2018-05-21 PROCEDURE — C1894 INTRO/SHEATH, NON-LASER: HCPCS

## 2018-05-21 PROCEDURE — 74011000250 HC RX REV CODE- 250: Performed by: PHYSICIAN ASSISTANT

## 2018-05-21 PROCEDURE — 83605 ASSAY OF LACTIC ACID: CPT | Performed by: INTERNAL MEDICINE

## 2018-05-21 PROCEDURE — 80048 BASIC METABOLIC PNL TOTAL CA: CPT | Performed by: INTERNAL MEDICINE

## 2018-05-21 PROCEDURE — 93306 TTE W/DOPPLER COMPLETE: CPT

## 2018-05-21 PROCEDURE — 83605 ASSAY OF LACTIC ACID: CPT | Performed by: PHYSICIAN ASSISTANT

## 2018-05-21 PROCEDURE — 82962 GLUCOSE BLOOD TEST: CPT

## 2018-05-21 PROCEDURE — 74018 RADEX ABDOMEN 1 VIEW: CPT

## 2018-05-21 PROCEDURE — 83735 ASSAY OF MAGNESIUM: CPT | Performed by: INTERNAL MEDICINE

## 2018-05-21 PROCEDURE — 027035Z DILATION OF CORONARY ARTERY, ONE ARTERY WITH TWO DRUG-ELUTING INTRALUMINAL DEVICES, PERCUTANEOUS APPROACH: ICD-10-PCS | Performed by: INTERNAL MEDICINE

## 2018-05-21 PROCEDURE — 82550 ASSAY OF CK (CPK): CPT | Performed by: INTERNAL MEDICINE

## 2018-05-21 PROCEDURE — B2101ZZ FLUOROSCOPY OF SINGLE CORONARY ARTERY USING LOW OSMOLAR CONTRAST: ICD-10-PCS | Performed by: INTERNAL MEDICINE

## 2018-05-21 PROCEDURE — 80307 DRUG TEST PRSMV CHEM ANLYZR: CPT | Performed by: INTERNAL MEDICINE

## 2018-05-21 PROCEDURE — 80346 BENZODIAZEPINES1-12: CPT | Performed by: INTERNAL MEDICINE

## 2018-05-21 PROCEDURE — 87086 URINE CULTURE/COLONY COUNT: CPT | Performed by: PHYSICIAN ASSISTANT

## 2018-05-21 PROCEDURE — 74011636637 HC RX REV CODE- 636/637: Performed by: INTERNAL MEDICINE

## 2018-05-21 PROCEDURE — 85347 COAGULATION TIME ACTIVATED: CPT

## 2018-05-21 PROCEDURE — 85027 COMPLETE CBC AUTOMATED: CPT | Performed by: INTERNAL MEDICINE

## 2018-05-21 PROCEDURE — 71045 X-RAY EXAM CHEST 1 VIEW: CPT

## 2018-05-21 PROCEDURE — 83735 ASSAY OF MAGNESIUM: CPT | Performed by: PHYSICIAN ASSISTANT

## 2018-05-21 PROCEDURE — 82803 BLOOD GASES ANY COMBINATION: CPT

## 2018-05-21 RX ORDER — EPTIFIBATIDE 2 MG/ML
180 INJECTION, SOLUTION INTRAVENOUS ONCE
Status: COMPLETED | OUTPATIENT
Start: 2018-05-21 | End: 2018-05-21

## 2018-05-21 RX ORDER — MAGNESIUM SULFATE HEPTAHYDRATE 40 MG/ML
2 INJECTION, SOLUTION INTRAVENOUS ONCE
Status: COMPLETED | OUTPATIENT
Start: 2018-05-21 | End: 2018-05-21

## 2018-05-21 RX ORDER — EPTIFIBATIDE 0.75 MG/ML
INJECTION, SOLUTION INTRAVENOUS
Status: COMPLETED
Start: 2018-05-21 | End: 2018-05-21

## 2018-05-21 RX ORDER — MIDAZOLAM HYDROCHLORIDE 1 MG/ML
.5-2 INJECTION, SOLUTION INTRAMUSCULAR; INTRAVENOUS
Status: CANCELLED | OUTPATIENT
Start: 2018-05-21

## 2018-05-21 RX ORDER — FUROSEMIDE 10 MG/ML
40 INJECTION INTRAMUSCULAR; INTRAVENOUS ONCE
Status: COMPLETED | OUTPATIENT
Start: 2018-05-21 | End: 2018-05-21

## 2018-05-21 RX ORDER — FENTANYL CITRATE 50 UG/ML
25-50 INJECTION, SOLUTION INTRAMUSCULAR; INTRAVENOUS
Status: DISCONTINUED | OUTPATIENT
Start: 2018-05-21 | End: 2018-05-23 | Stop reason: HOSPADM

## 2018-05-21 RX ORDER — CHLORHEXIDINE GLUCONATE 1.2 MG/ML
10 RINSE ORAL EVERY 12 HOURS
Status: DISCONTINUED | OUTPATIENT
Start: 2018-05-21 | End: 2018-05-22

## 2018-05-21 RX ORDER — CHLORHEXIDINE GLUCONATE 1.2 MG/ML
10 RINSE ORAL ONCE
Status: DISCONTINUED | OUTPATIENT
Start: 2018-05-21 | End: 2018-05-21 | Stop reason: SDUPTHER

## 2018-05-21 RX ORDER — INSULIN LISPRO 100 [IU]/ML
INJECTION, SOLUTION INTRAVENOUS; SUBCUTANEOUS EVERY 6 HOURS
Status: DISCONTINUED | OUTPATIENT
Start: 2018-05-21 | End: 2018-05-22

## 2018-05-21 RX ORDER — HEPARIN SODIUM 1000 [USP'U]/ML
INJECTION, SOLUTION INTRAVENOUS; SUBCUTANEOUS
Status: COMPLETED
Start: 2018-05-21 | End: 2018-05-21

## 2018-05-21 RX ORDER — EPTIFIBATIDE 0.75 MG/ML
1 INJECTION, SOLUTION INTRAVENOUS CONTINUOUS
Status: DISPENSED | OUTPATIENT
Start: 2018-05-21 | End: 2018-05-21

## 2018-05-21 RX ORDER — IPRATROPIUM BROMIDE AND ALBUTEROL SULFATE 2.5; .5 MG/3ML; MG/3ML
3 SOLUTION RESPIRATORY (INHALATION)
Status: DISCONTINUED | OUTPATIENT
Start: 2018-05-21 | End: 2018-05-23 | Stop reason: HOSPADM

## 2018-05-21 RX ORDER — IODIXANOL 320 MG/ML
1-150 INJECTION, SOLUTION INTRAVASCULAR
Status: DISCONTINUED | OUTPATIENT
Start: 2018-05-21 | End: 2018-05-21 | Stop reason: HOSPADM

## 2018-05-21 RX ORDER — EPTIFIBATIDE 2 MG/ML
INJECTION, SOLUTION INTRAVENOUS
Status: COMPLETED
Start: 2018-05-21 | End: 2018-05-21

## 2018-05-21 RX ORDER — PHENYLEPHRINE HCL IN 0.9% NACL 1 MG/10 ML
100 SYRINGE (ML) INTRAVENOUS
Status: CANCELLED | OUTPATIENT
Start: 2018-05-21

## 2018-05-21 RX ORDER — FENTANYL CITRATE 50 UG/ML
12.5-1 INJECTION, SOLUTION INTRAMUSCULAR; INTRAVENOUS
Status: CANCELLED | OUTPATIENT
Start: 2018-05-21

## 2018-05-21 RX ORDER — METOPROLOL TARTRATE 25 MG/1
6.25 TABLET, FILM COATED ORAL EVERY 12 HOURS
Status: DISCONTINUED | OUTPATIENT
Start: 2018-05-21 | End: 2018-05-22

## 2018-05-21 RX ORDER — PROPOFOL 10 MG/ML
0-50 VIAL (ML) INTRAVENOUS
Status: DISCONTINUED | OUTPATIENT
Start: 2018-05-21 | End: 2018-05-22

## 2018-05-21 RX ORDER — LIDOCAINE HYDROCHLORIDE 10 MG/ML
1-30 INJECTION, SOLUTION EPIDURAL; INFILTRATION; INTRACAUDAL; PERINEURAL
Status: CANCELLED | OUTPATIENT
Start: 2018-05-21

## 2018-05-21 RX ORDER — MIDAZOLAM HYDROCHLORIDE 1 MG/ML
1-2 INJECTION, SOLUTION INTRAMUSCULAR; INTRAVENOUS
Status: DISCONTINUED | OUTPATIENT
Start: 2018-05-21 | End: 2018-05-22

## 2018-05-21 RX ADMIN — TICAGRELOR 90 MG: 90 TABLET ORAL at 08:20

## 2018-05-21 RX ADMIN — CHLORHEXIDINE GLUCONATE 10 ML: 1.2 RINSE ORAL at 04:14

## 2018-05-21 RX ADMIN — METHYLPREDNISOLONE SODIUM SUCCINATE 40 MG: 125 INJECTION, POWDER, FOR SOLUTION INTRAMUSCULAR; INTRAVENOUS at 21:20

## 2018-05-21 RX ADMIN — EPTIFIBATIDE 1 MCG/KG/MIN: 0.75 INJECTION, SOLUTION INTRAVENOUS at 01:54

## 2018-05-21 RX ADMIN — CHLORHEXIDINE GLUCONATE 10 ML: 1.2 RINSE ORAL at 21:19

## 2018-05-21 RX ADMIN — SODIUM CHLORIDE 20 MG: 9 INJECTION INTRAMUSCULAR; INTRAVENOUS; SUBCUTANEOUS at 08:21

## 2018-05-21 RX ADMIN — EPTIFIBATIDE 1 MCG/KG/MIN: 75 INJECTION INTRAVENOUS at 01:54

## 2018-05-21 RX ADMIN — MAGNESIUM SULFATE HEPTAHYDRATE 2 G: 40 INJECTION, SOLUTION INTRAVENOUS at 04:14

## 2018-05-21 RX ADMIN — PROPOFOL 35 MCG/KG/MIN: 10 INJECTION, EMULSION INTRAVENOUS at 16:17

## 2018-05-21 RX ADMIN — FENTANYL CITRATE 25 MCG: 50 INJECTION INTRAMUSCULAR; INTRAVENOUS at 21:22

## 2018-05-21 RX ADMIN — METHYLPREDNISOLONE SODIUM SUCCINATE 40 MG: 125 INJECTION, POWDER, FOR SOLUTION INTRAMUSCULAR; INTRAVENOUS at 05:54

## 2018-05-21 RX ADMIN — FUROSEMIDE 40 MG: 10 INJECTION, SOLUTION INTRAMUSCULAR; INTRAVENOUS at 10:14

## 2018-05-21 RX ADMIN — IPRATROPIUM BROMIDE AND ALBUTEROL SULFATE 3 ML: .5; 3 SOLUTION RESPIRATORY (INHALATION) at 08:13

## 2018-05-21 RX ADMIN — Medication 10 ML: at 21:21

## 2018-05-21 RX ADMIN — ATORVASTATIN CALCIUM 80 MG: 40 TABLET, FILM COATED ORAL at 21:20

## 2018-05-21 RX ADMIN — IODIXANOL 85 ML: 320 INJECTION, SOLUTION INTRAVASCULAR at 02:04

## 2018-05-21 RX ADMIN — Medication 10 ML: at 13:24

## 2018-05-21 RX ADMIN — PROPOFOL 35 MCG/KG/MIN: 10 INJECTION, EMULSION INTRAVENOUS at 21:19

## 2018-05-21 RX ADMIN — INSULIN LISPRO 2 UNITS: 100 INJECTION, SOLUTION INTRAVENOUS; SUBCUTANEOUS at 23:55

## 2018-05-21 RX ADMIN — IPRATROPIUM BROMIDE AND ALBUTEROL SULFATE 3 ML: .5; 3 SOLUTION RESPIRATORY (INHALATION) at 11:06

## 2018-05-21 RX ADMIN — HEPARIN SODIUM 8000 UNITS: 1000 INJECTION, SOLUTION INTRAVENOUS; SUBCUTANEOUS at 01:10

## 2018-05-21 RX ADMIN — EPTIFIBATIDE 17.8 MG: 2 INJECTION, SOLUTION INTRAVENOUS at 01:42

## 2018-05-21 RX ADMIN — EPTIFIBATIDE 1 MCG/KG/MIN: 0.75 INJECTION, SOLUTION INTRAVENOUS at 11:18

## 2018-05-21 RX ADMIN — INSULIN GLARGINE 10 UNITS: 100 INJECTION, SOLUTION SUBCUTANEOUS at 08:21

## 2018-05-21 RX ADMIN — METOPROLOL TARTRATE 6.25 MG: 25 TABLET ORAL at 10:14

## 2018-05-21 RX ADMIN — FERROUS SULFATE TAB 325 MG (65 MG ELEMENTAL FE) 325 MG: 325 (65 FE) TAB at 08:20

## 2018-05-21 RX ADMIN — PROPOFOL 25 MCG/KG/MIN: 10 INJECTION, EMULSION INTRAVENOUS at 04:13

## 2018-05-21 RX ADMIN — METHYLPREDNISOLONE SODIUM SUCCINATE 40 MG: 125 INJECTION, POWDER, FOR SOLUTION INTRAMUSCULAR; INTRAVENOUS at 14:20

## 2018-05-21 RX ADMIN — ASPIRIN 81 MG 81 MG: 81 TABLET ORAL at 08:20

## 2018-05-21 RX ADMIN — IPRATROPIUM BROMIDE AND ALBUTEROL SULFATE 3 ML: .5; 3 SOLUTION RESPIRATORY (INHALATION) at 02:46

## 2018-05-21 RX ADMIN — PROPOFOL 25 MCG/KG/MIN: 10 INJECTION, EMULSION INTRAVENOUS at 10:38

## 2018-05-21 RX ADMIN — EPTIFIBATIDE 17.8 MG: 2 INJECTION, SOLUTION INTRAVENOUS at 01:29

## 2018-05-21 RX ADMIN — TICAGRELOR 90 MG: 90 TABLET ORAL at 17:51

## 2018-05-21 RX ADMIN — INSULIN LISPRO 2 UNITS: 100 INJECTION, SOLUTION INTRAVENOUS; SUBCUTANEOUS at 11:44

## 2018-05-21 RX ADMIN — IPRATROPIUM BROMIDE AND ALBUTEROL SULFATE 3 ML: .5; 3 SOLUTION RESPIRATORY (INHALATION) at 20:53

## 2018-05-21 RX ADMIN — Medication 10 ML: at 05:57

## 2018-05-21 RX ADMIN — FERROUS SULFATE TAB 325 MG (65 MG ELEMENTAL FE) 325 MG: 325 (65 FE) TAB at 17:52

## 2018-05-21 RX ADMIN — INSULIN LISPRO 2 UNITS: 100 INJECTION, SOLUTION INTRAVENOUS; SUBCUTANEOUS at 06:00

## 2018-05-21 RX ADMIN — PROPOFOL 20 MCG/KG/MIN: 10 INJECTION, EMULSION INTRAVENOUS at 00:50

## 2018-05-21 RX ADMIN — METOPROLOL TARTRATE 6.25 MG: 25 TABLET ORAL at 21:21

## 2018-05-21 RX ADMIN — IPRATROPIUM BROMIDE AND ALBUTEROL SULFATE 3 ML: .5; 3 SOLUTION RESPIRATORY (INHALATION) at 15:25

## 2018-05-21 NOTE — CONSULTS
[facility] - Consult Note    Consultation request by No admitting provider for patient encounter. for advice/opinion related to evaluating There are no admission diagnoses documented for this encoun*    Date of  Admission: 5/20/2018  7:11 PM   Primary Care Physician:  Jojo Donovan NP     Assessment:     - Acute coronary syndrome  - EKG with Anterolateral ST elevation  - DM  - HTN  - HLD  - Obesity  - Smoking , on going     Plan:     - IV heparin  - ASA  - NTG  - Morphine  - DW patient regarding management strategy which includes medical management vs. Ischemia evaluation ( non-invasive vs. Invasive). Risk, benefit and alternatives of each strategy discussed in detail. Risk, benefit, complication of LHC and possible PCI ( including but not limited to bleeding, vascular trauma requiring surgery,  infection, heart failure, stroke, MI, emergent bypass surgery, severe allergic reactions, kidney failure, dialysis and death ) were discussed with patient and willing to proceed with procedure. Will be using moderate sedation           History of Present Illness: This is a 74 y.o. female admitted for There are no admission diagnoses documented for this encoun*.     Patient complains of:     Chest pain that started 3 hours ago  Never had MI or CHF  shart pain with left arm discomfort  EKG in ED showed STEMI  Saw patient emergently at bedside in ED  Pain now much better and now resolved after S/L NTG  EKG concerning but still on going arm pain  No nausea or vomiting  No diaphoresis    Cardiac risk factors: smoking/ tobacco exposure, dyslipidemia, diabetes mellitus, obesity, hypertension      Review of Symptoms:  Except as stated above include:  Constitutional:  negative  Respiratory:  negative  Cardiovascular:  negative  Gastrointestinal: negative  Genitourinary:  negative  Musculoskeletal:  Negative  Neurological:  Negative  Dermatological:  Negative  Endocrinological: Negative  Psychological: Negative    A comprehensive review of systems was negative except for that written in the HPI. Past Medical History:     Past Medical History:   Diagnosis Date    Abnormal WBC count 5/17/2016    Bilateral shoulder pain 9/27/2016    Caregiver stress 4/26/2017    Chondromalacia of both patellae     Chronic lung disease     [facility] obstructive pulmonary disease ([facility] 5/17/2016    COPD (chronic obstructive pulmonary disease) ([facility] 9/2015    mild-mod dz; Dr Bradley Hilario    Diabetes [facility]) 2013    Diabetes mellitus ([facility]     [facility] 2016    Dilated cardiomyopathy [facility]     Dyslipidemia     Elevated alkaline phosphatase level 9/27/2016    History of echocardiogram 11/14/2014    Mild LVE. EF 40%. Mild, diffuse hypk. Mild LAE.   Mild MR.      Hypercholesteremia 1/08/14    Hypertension 2000    Noncompliance with medications 2/16/2016    Obesity     Orthostatic hypotension 5/17/2016    Osteoarthritis of both knees     Pain management 04/01/2016    Dr. Lencho Conde Popliteal cyst, bilateral      Vitamin D deficiency 10/16/14         Social History:     Social History     Social History    Marital status: LEGALLY      Spouse name: N/A    Number of children: 3    Years of education: N/A     Occupational History    retired      Social History Main Topics    Smoking status: Former Smoker     Packs/day: 0.25     Years: 45.00     Types: Cigarettes     Quit date: 8/17/2015    Smokeless tobacco: None    Alcohol use No    Drug use: No    Sexual activity: Yes     Other Topics Concern     Service No    Blood Transfusions No    Caffeine Concern No    Occupational Exposure No    Hobby Hazards No    Sleep Concern No    Stress Concern No    Weight Concern No    Special Diet No    Back Care No    Exercise No    Bike Helmet No    Seat Belt Yes    Self-Exams Yes     Social History Narrative        Family History:     Family History   Problem Relation Age of Onset    Diabetes Mother     Hypertension Mother     Hypertension Father     Kidney Disease Maternal Aunt 48     Dialysis        Medications:   No Known Allergies     Current Facility-Administered Medications   Medication Dose Route Frequency    heparin (porcine) 1,000 unit/mL injection 1,000-10,000 Units  1,000-10,000 Units IntraVENous PRN    bivalirudin (ANGIOMAX) 250 mg in 0.9% sodium chloride (MBP/ADV) 50 mL infusion  1.75 mg/kg/hr IntraVENous CONTINUOUS    bivalirudin (ANGIOMAX) injection 70 mg  0.75 mg/kg IntraVENous ONCE    iopamidol (ISOVUE 300) 61 % contrast injection 1-150 mL  1-150 mL IntraVENous RAD ONCE    lidocaine (PF) (XYLOCAINE) 10 mg/mL (1 %) injection 1-60 mL  1-60 mL SubCUTAneous Multiple    midazolam (VERSED) injection 0.5-1 mg  0.5-1 mg IntraVENous Multiple    verapamil (ISOPTIN) 2.5 mg/mL injection 2.5-5 mg  2.5-5 mg IntraVENous ONCE    nitroglycerine compounded injection 100-200 mcg  100-200 mcg IntraCORONary Multiple    fentaNYL citrate (PF) injection 12.5-100 mcg  12.5-100 mcg IntraVENous Multiple         Physical Exam:     Visit Vitals    BP (!) 165/91    Pulse (!) 119    Temp 98.2 °F (36.8 °C)    Resp 18    Wt 206 lb (93.4 kg)    SpO2 99%    BMI 35.36 kg/m2     BP Readings from Last 3 Encounters:   05/20/18 (!) 165/91   04/26/17 168/76   01/04/17 157/81     Pulse Readings from Last 3 Encounters:   05/20/18 (!) 119   04/26/17 76   01/04/17 96     Wt Readings from Last 3 Encounters:   05/20/18 206 lb (93.4 kg)   04/26/17 191 lb 12.8 oz (87 kg)   01/04/17 198 lb 9.6 oz (90.1 kg)       General:  alert, cooperative, mild distress, appears stated age  Neck:  no JVD  Lungs:  clear to auscultation bilaterally  Heart:  regular rate and rhythm, S1, S2 normal, no murmur, click, rub or gallop  Abdomen:  abdomen is soft without significant tenderness, masses, organomegaly or guarding  Extremities:  extremities normal, atraumatic, no cyanosis or edema  Skin: Warm and dry.  no hyperpigmentation, vitiligo, or suspicious lesions  Neuro: alert, oriented x3, affect appropriate, no focal neurological deficits, moves all extremities well, no involuntary movements, reflexes at knee and ankle intact  Psych: non focal     Data Review:     Recent Labs      05/20/18 1916   WBC  4.8   HGB  11.6*   HCT  36.2   PLT  301     Recent Labs      05/20/18 1916   NA  139   K  4.3   CL  109*   CO2  22   GLU  128*   BUN  28*   CREA  1.61*   CA  9.1       Results for orders placed or performed during the hospital encounter of 05/20/18   EKG, 12 LEAD, INITIAL   Result Value Ref Range    Ventricular Rate 109 BPM    Atrial Rate 109 BPM    P-R Interval 152 ms    QRS Duration 80 ms    Q-T Interval 316 ms    QTC Calculation (Bezet) 425 ms    Calculated P Axis 81 degrees    Calculated R Axis 23 degrees    Calculated T Axis -165 degrees    Diagnosis       Sinus tachycardia  Possible Left atrial enlargement  Left ventricular hypertrophy  Anteroseptal infarct , possibly acute  Lateral injury pattern  ACUTE MI  Abnormal ECG  When compared with ECG of 03-MAY-2016 08:54,  Significant changes have occurred     Results for orders placed or performed in visit on 12/31/14   AMB POC EKG ROUTINE W/ 12 LEADS, INTER & REP    Impression    See progress note.        All Cardiac Markers in the last 24 hours:    Lab Results   Component Value Date/Time     05/20/2018 07:16 PM    CKMB 5.4 (H) 05/20/2018 07:16 PM    CKND1 3.8 05/20/2018 07:16 PM    TROIQ 0.92 (H) 05/20/2018 07:16 PM    TNIPOC 0.50 (Located within [facility]) 05/20/2018 07:27 PM       Last Lipid:    Lab Results   Component Value Date/Time    Cholesterol, total 150 04/13/2017 09:06 AM    HDL Cholesterol 40 04/13/2017 09:06 AM    LDL, calculated 91.2 04/13/2017 09:06 AM    Triglyceride 94 04/13/2017 09:06 AM    CHOL/HDL Ratio 3.8 04/13/2017 09:06 AM       Signed By: Racquel Armas MD     May 20, 2018

## 2018-05-21 NOTE — PROGRESS NOTES
conducted a Follow up consultation and Spiritual Assessment for Arash Gunn, who is a 74 y.o.,female. The  provided the following Interventions:  Continued the relationship of care and support. Listened empathically. Offered assurance of continued prayer on patients behalf. Chart reviewed. The following outcomes were achieved:  Patient's sister expressed gratitude for 's visit. Assessment:  There are no further spiritual or Christianity issues which require Spiritual Care Services interventions at this time. Plan:  Chaplains will continue to follow and will provide pastoral care on an as needed/requested basis.  recommends bedside caregivers page  on duty if patient shows signs of acute spiritual or emotional distress.        [address]   [phone]

## 2018-05-21 NOTE — PROGRESS NOTES
SUBJECTIVE:    Patient is intubated and sedated. No new issues per nursing. OBJECTIVE:    /67  Pulse 86  Temp 97.7 °F (36.5 °C)  Resp 18  Ht 5' 4\" (1.626 m)  Wt 98.9 kg (218 lb 0.6 oz)  SpO2 100%  BMI 37.43 kg/m2    HEENT:  ETT +  CVS: RRR  RS: CTA bilaterally, no wheezes. GI: NT, BS +  Extremities: no pedal edema  General: sedated    ASSESSMENT:    1. Anterolateral STEMI s/p YADIRA to proximal LAD with RESOLUTE stent. 2. Anteroseptal STEMI s/p stenting to ostial and mid LAD lesions with RESOLUTE stent. 3. Acute hypoxic respiratory failure sec to acute pulmonary edema and HTN'madhav cardiomyopathy. 4. 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   5. DM Type II  6. Dyslipidemia. 7. HTN  8. COPD  9.  History of non-compliance    PLAN:    Cont current management  Cardiology and pulmonology to follow    CMP:   Lab Results   Component Value Date/Time     05/21/2018 10:30 AM    K 5.3 05/21/2018 11:25 AM     05/21/2018 10:30 AM    CO2 20 (L) 05/21/2018 10:30 AM    AGAP 11 05/21/2018 10:30 AM     (H) 05/21/2018 10:30 AM    BUN 35 (H) 05/21/2018 10:30 AM    CREA 1.73 (H) 05/21/2018 10:30 AM    GFRAA 36 (L) 05/21/2018 10:30 AM    GFRNA 30 (L) 05/21/2018 10:30 AM    CA 8.3 (L) 05/21/2018 10:30 AM    MG 2.4 05/21/2018 10:30 AM    PHOS 4.0 05/21/2018 10:30 AM    ALB 3.4 05/21/2018 06:23 AM    TP 7.3 05/21/2018 06:23 AM    GLOB 3.9 05/21/2018 06:23 AM    AGRAT 0.9 05/21/2018 06:23 AM    SGOT 560 (H) 05/21/2018 06:23 AM    ALT 76 (H) 05/21/2018 06:23 AM     CBC:   Lab Results   Component Value Date/Time    WBC 11.7 05/21/2018 06:23 AM    HGB 11.8 (L) 05/21/2018 06:23 AM    HCT 37.5 05/21/2018 06:23 AM     05/21/2018 06:23 AM

## 2018-05-21 NOTE — PROGRESS NOTES
Patient seen again emergently in ICU  Patient tolerated previous procedure well  In ICU, started becoming SOB and then had pulmonary edema and hypertensive emergency, not responding to IV NTG drip. Nurse also gave hydralazine  IV labetolol given and then patient became hypotensive and bradycardic. Intuabted  Lasix also given  EKG showed ST elevation anterolaterally  Discuss with  and sister at bedside extensively about procedure. Risk , benefit and alternatives discussed. High risk nature of procedure discussed. Will proceed to [facility] after discussion with  and sister.

## 2018-05-21 NOTE — PROGRESS NOTES
Received a call from Dr Phu Glasgow to place Pt in ICU   A sper cardiologist, pt had a successful intervention with a single CAD s/p stent placement and was placed in ICU for monitoring. Her EKG was normalizing    Shortly after I received a call from Pt's RN  in cardiac ICU as Pt was having severe SOB, sounded congested and her BP was > 706 systolic. Lasix was ordered over the phone  On my arrival shortly after, pt was found to be in respiratory distress, Tachypniac  and continued to sound congested and wheezy. BP > 881 systolic despite being on NG after cardia cath  Another lasix 40 mg was given but Pt continued to be in progressively worsening SOB and kept asking for help  BIPAP was ordered and and a dose of labetalol 20 mg was given to control BP. Pt continued to have SOB and became altered. Pt then became bradycardiac in high 40's and a dose of Atropine was given and HR rebounded but Pt continued to be altered. Pt was then intubated for acute respiratory failure   Repeat EKG showed ST elevations now in multiple leads. Vitall stable now and intubated  Cardiology is contacted so as ICU again   Pt will be taken back to cath lab for further intervention   at bedside during the episode >. Very frustrated and angry.

## 2018-05-21 NOTE — PROGRESS NOTES
Critical Result Notification    Received and verbally repeated the following test results Potassium 6.3 AND Lactic Acid 4.8 from [place] in lab  on 5/21/18 @ 0050. Primary Nurse Patrick Wyatt made aware.      Reji Montalvo RN

## 2018-05-21 NOTE — PROCEDURES
cardiac cath report. PreOpDiagnosis:   Acute anterolateral MI    Findings/PostOp Diagnosis:  1. Single vessel CAD LAD as mentioned below  * S/P PCI and stenting of proximal LAD 90-95% stenosis using 3.5 X 18 mm XIENCE YADIRA    Plan:  1. Dual antiplatelet agent and CAD management    Procedures:  * LHC   * LV angiography  * Selective Bilateral Coronary Angiography  * Conscious sedation using versad and fentanyl  S/P PCI and stenting of proximal LAD 90-95% stenosis using 3.5 X 18 mm XIENCE YADIRA  * Right Femoral Angiography  * Percutanous Closure Device    Procedure detail:  Risks, benefits, and alternatives and complications of procedure were explained to the patient and appropriate informed consent was obtained prior to the procedure. The patient was brought to the cath lab and was prepped and draped in the usual sterile manner.  Moderate sedation was achieved with the appropriate medications. Lidocaine was used to secure local anesthesia. The right femoral artery was cannulated using a modified Seldinger technique and a 6 French sheath was placed.   LHC was performed using 6F JR4 and XB 3.5 catheter under fluoroscopic guidance  All catheter was exchanged and advance over 0.035 inch J tip guide wire. Interventional procedure detail:  Total dose of IV Heparin 8000 units was given and ACT was checked accordingly throughout procedure. Guide catheter 6F XB 3.5 used to selectively cannulate LM. The  lesion in the LAD coronary artery was crossed with a 0.014\" Run through guide wire without difficulty. Pre-dilation was performed using  2.5 X 15 mm TREK balloon. Stenting was performed with use of a 3.5 X 18 mm XIENCE YADIRA stent. Post-dilation performed using 3.5 X 15 mm NC TREK balloon   Intracoronary NTG was used as needed during the procedure.    ISRRAEL 2 flow before and ISRRAEL 3 flow after procedure  Stenosis reduction from 95% to 0%  After an appropriate waiting phase final coronary angiograms were obtained and the catheter was withdrawn. EKG performed in cath lab showed resolution of ST changes. Chest pain resolved, arm pain getting better ( also has chronic arthrotis of shoulder per patient)  No immediate complication noted  No major hamatoma or bleeding immediately. Closure: 6F Angioseal closure device  Antiplatelet therapy given: 180 mg Brilinta in cath lab   Estimated Blood Loss: Minimal  Specimens: None  Assistants: Per [facility]  Complications: None    Conscious sedation : Using versad 2 mg and Fentanyl 100 mcg.  Total time ~ 90 mins    FINDINGS:    LVGram:  ECHO was performed  LVEDP ~ 27 mm Hg    Coronary angiography:  Dominance: Right  LM: Normal  LAD: High proximal 90-95% tubular stenosis, D1: ostial 50%, small to medium vessel  LCX: / OM: Normal  RCA: Dominant, mid and distal 20-30% otherwise normal  S/P PCI and stenting of proximal LAD 90-95% stenosis using 3.5 X 18 mm XIENCE YADIRA Finch MD  5/20/2018, 9:17 PM

## 2018-05-21 NOTE — PROGRESS NOTES
Duncan Loya Note  [facility]    Patient: Deneen Hernandez 74 y.o. female  [id number]  1950      Admit Date: 5/20/2018   Admitting Diagnosis: STEMI (ST elevation myocardial infarction) ([address]    CODE BLUE     Code Blue Called at 11:10 pm. When PFM team arrived patient was lying in bed. She had pulse that was confirmed by Doppler. The attending Dr. Demetra Watkins was at bedside and was managing the code. OBJECTIVE     Patient Vitals for the past 24 hrs:   Temp Pulse Resp BP SpO2   05/20/18 1945 - - - - 99 %   05/20/18 1944 - (!) 105 - - 99 %   05/20/18 1930 - (!) 119 18 (!) 165/91 99 %   05/20/18 1923 - (!) 121 30 (!) 144/123 100 %   05/20/18 1917 98.2 °F (36.8 °C) (!) 114 29 (!) 188/107 100 %       ASSESSMENT, PLAN & DISPOSITION   Deneen Hernandez is a 74y.o. year old female admitted for Acute coronary syndrome. Code Blue called for loss of pulse. Patient regained pulse confirmed by doppler. The attending Dr. Demetra Watkins was at bedside and was managing the code. Disposition: 2351    Attending Dr. Demetra Watkins notified of code blue. In agreement with plan. Primary team resuming care.      Terese Pablo MD, PGY-1  Jens Garland 10

## 2018-05-21 NOTE — PROGRESS NOTES
responded to Code for  Hilda Unger, who is a 74 y.o.,female,     The  provided the following Interventions:  Provided crisis pastoral care and pastoral support. Offered prayers on behalf for the patient. Chart reviewed. The following outcomes were achieved:  Patient was successfully resuscitated. Assessment:  There are no spiritual or Denominational issues which require intervention at this time. Plan:  Chaplains will continue to follow and will provide pastoral care on an as needed/requested basis.  recommends bedside caregivers page  on duty if patient shows signs of acute spiritual or emotional distress.        [id number] [facility].   [phone]

## 2018-05-21 NOTE — PROGRESS NOTES
0730-Night report received and bedside visual handoff.  0800-Assessment completed per flow sheet. Pt sedated on propofol gtt, opens eyes to reposition and pt care. Afebrile. NSR on monitor, BP stable. Integrilin gtt infusing. All peripheral pulses palpable. Left groin arterial/venous sheath in situ with transduced waveform to arterial line, draws and flushes easily. Chest has fair a/e, coarse to right side. Vent changes per pulmonary and RT. OGT placement confirmed per xray and air bolus and auscultation. Brilinta given via OGT and clamped. 1030-Labs drawn and sent to lab. 1040- at bedside, updated on POC and pt status per nursing. 1120-Labs reviewed by RN, K+ level recorded by lab as 6.0. No ectopy on monitor, consider pt had 40mg IV lasix dose after labs drawn and diuresing. Rechecking K+ level stat per protocol. Drawn and ordered for .   1205-K+ reviewed, now reading 5.3, WNL. Right groin cath entry site with moderate amount of fresh sang drainage, cleansed with chlorhexidine swab and redressed with 4x4 gauze and transparent bioclusive dressing. 1300-Florencio, RN at bedside to discontinue venous/arterial sheaths. Pressure held until hemostasis obtained. RN assisted with dressing application. Both groin soft, palpable with no hematoma. 1400-VSS s/p sheath removal. Sites and dressings to bilateral groins remain CDI. BLE are warm with palpable pulses. 1417-RN spoke with lab to request dept to collect pt next sequence of labs, BMP, Mg, Phos and lactic acid at 1700. Spoke with walker Paredes who confirmed lab would draw (pt has no arterial line). 1418-Echo called, en route to do test in pt's room. 1425-Bilateral groin sites re-assessed, left groin sheath site CDI. However right groin cath entry site dressing saturated with moderate amount of fresh sang. Dressing removed, site assessed, small puncture site unchanged, no hematoma palpated.  Manual pressure held to right groin femoral pulse for 10minutes, hemostasis obtained. New dressing placed with 4x4 and biocclusive dressing. Will monitor closely. Page to Jerry TORREZ to discuss stopping Integrilin gtt as it has been 12hrs s/p 2nd cath. 1515-R groin site again saturated with sang drainage. Integrilin gtt order has  and infusion stopped. Pressure again held to R groin site for 15 min until hemostasis obtained. New 4x4 gauze and bioclusive dressing placed. Left groin remains CDI, no bleeding. Will keep close monitor. 1623-R groin started to ooze again. RN also observed hematuria in soto, grapefruit color with few clots. Pt placed flat, propofol gtt increased to 35mcg/kg/min to assist c pt comfort and prevent bucking tube and increasing intra-abdominal pressure. Manual pressure held for 30min. Hemostasis obtained. Site soft, R peripheral pulses +2 and palpable, RLE warm. Dressing changed and 1Litre IV bag placed over site. BP & Pulse stable with SBP>105, and NSR in 80's. 1625-RN called and spoke with  to notify him of Right groin frequent drainage and hematuria. MD advised that Cards team will come and assess pt.  1645-Pt flat, in NAD, VSS. R groin site remains CDI. Pressure bag on groin. Bloodwork drawn per lab. 1730-Labs reviewed, non-remarkable for pt event. R groin has been CDI and soft for 1.5 hours. Repositioned HOB to 20 degrees. 1745- at bedside, updated on pt progress. 1800-R groin site WNL. Pt awakes to verbal cues and reposition, follows commands to squeeze hands. Urine is clearing in soto to yellow/straw. VSS. See flow sheet for details. 193-Bedside report to [facility], RN. Bilateral groin sites, CDI. VSS.

## 2018-05-21 NOTE — PROGRESS NOTES
[facility]  -  Progress Note      Patient: Damaso Ahn MRN: [id number]  SSN: [id number]    YOB: 1950  Age: 74 y.o. Sex: female      Admit Date: 5/20/2018    Assessment:     Hospital Problems  Date Reviewed: 5/20/2018          Codes Class Noted POA    * (Principal)STEMI (ST elevation myocardial infarction) ([facility] ICD-10-CM: I21.3  ICD-9-CM: 410.90  5/20/2018 Yes        Acute pulmonary edema (HCC) ICD-10-CM: J81.0  ICD-9-CM: 518.4  5/20/2018 Unknown        Essential hypertension ICD-10-CM: I10  ICD-9-CM: 401.9  9/17/2015 Yes        COPD (chronic obstructive pulmonary disease) ([facility] ICD-10-CM: J44.9  ICD-9-CM: 496  9/1/2015 Yes    Overview Signed 9/11/2015  5:45 PM by Pili Dominique NP     mild-mod dz; Dr Rivera Gray             Diabetes mellitus type 2, insulin dependent ([facility] ICD-10-CM: E11.9, Z79.4  ICD-9-CM: 250.00, V58.67  7/2/2015 Yes        Cardiomyopathy, dilated ([facility] ICD-10-CM: I42.0  ICD-9-CM: 425.4  12/31/2014 Yes            -Anterolateral STEMI- s/p YADIRA to proximal LAD with RESOLUTE stent.  -Additional stenting to ostial and mid LAD lesions with RESOLUTE stenting after acute pulmonary edema and HTN'madhav. Intubated and found to have another STEMI. Currently intubated and sedated. 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 Now with pulmonary edema and possible worsening of her EF. Echo pending. -DM Type II  -Dyslipidemia- on [place]uet  -HTN  -COPD  -History of non-compliance    No primary cardiologist    Plan:     Stable at this time with stable vitals  Will continue with Integrilin for at least 12 hours from last cath and stop. Patient may likely have aspirated, and will defer to primary team on management for possible aspiration pneumonia  LFT's historically normal and elevated likely from event. Will continue with statins and monitor. Will give IV Lasix and monitor output. Continue with ASA, ACE and Brilinta. Will consider removing sheath today.  Will discuss with MD.  Will start on low dose BB and monitor HR    Subjective:     Patient s/p 2 cardiac caths from yesterday. Had initial STEMI alert and YADIRA to  proximal LAD. Brought to the ICU post procedure stable and later had dyspnea, which developed into pulmonary edema and another STEMI.  Taken to the lab again with an additional stenting to the ostial and mid-LAD    Objective:      Patient Vitals for the past 8 hrs:   Temp Pulse Resp BP SpO2   05/21/18 0830 - 74 17 - 99 %   05/21/18 0800 97.5 °F (36.4 °C) 74 16 107/65 93 %   05/21/18 0730 - 76 15 - 92 %   05/21/18 0700 - 78 15 104/66 93 %   05/21/18 0645 - 78 15 - 92 %   05/21/18 0630 - 76 15 - 92 %   05/21/18 0615 - 78 16 - 96 %   05/21/18 0600 - 80 19 102/81 99 %   05/21/18 0545 - 80 16 - 98 %   05/21/18 0515 - 78 16 - 97 %   05/21/18 0500 - 80 16 100/59 96 %   05/21/18 0445 - 80 17 - 96 %   05/21/18 0430 - 80 17 - 97 %   05/21/18 0415 - 80 17 - 97 %   05/21/18 0400 97.7 °F (36.5 °C) 82 16 106/63 97 %   05/21/18 0345 - 82 17 110/70 98 %   05/21/18 0330 - 80 16 - 100 %   05/21/18 0315 - 82 16 - 100 %   05/21/18 0253 - 86 18 - 98 %   05/21/18 0250 - 81 13 - 98 %         Patient Vitals for the past 96 hrs:   Weight   05/20/18 2214 98.9 kg (218 lb 0.6 oz)   05/20/18 1917 93.4 kg (206 lb)         Intake/Output Summary (Last 24 hours) at 05/21/18 0853  Last data filed at 05/21/18 0800   Gross per 24 hour   Intake           467.99 ml   Output             2480 ml   Net         -2012.01 ml       Physical Exam:  General:  cooperative, no distress, appears stated age, intubated, sedated  Neck:  no JVD  Lungs:  Clear anterior without wheeze  Heart:  regular rate and rhythm, S1, S2 normal, no murmur, click, rub or gallop  Extremities:  extremities normal, atraumatic, no cyanosis or edema    Data Review:     Labs: Results:       Chemistry Recent Labs      05/21/18   0623  05/21/18   0230  05/21/18   0010  05/20/18   1916   GLU  165*  252*  366*  128*   NA  138  138  134*  139   K 5.2  4.9  6.3*  4.3   CL  105  105  104  109*   CO2  23  23  18*  22   BUN  32*  31*  30*  28*   CREA  1.95*  2.06*  2.08*  1.61*   CA  8.7  8.0*  8.2*  9.1   MG  2.7*  1.8   --    --    PHOS  4.7  4.0   --    --    AGAP  10  10  12  8   BUCR  16  15  14  17   AP  114   --    --   112   TP  7.3   --    --   7.3   ALB  3.4   --    --   3.6   GLOB  3.9   --    --   3.7   AGRAT  0.9   --    --   1.0      CBC w/Diff Recent Labs      05/21/18   0623  05/21/18   0010  05/20/18   1916   WBC  11.7  6.5  4.8   RBC  4.27  4.34  4.23   HGB  11.8*  11.7*  11.6*   HCT  37.5  37.5  36.2   PLT  331  393  301   GRANS   --    --   59   LYMPH   --    --   28   EOS   --    --   5      Cardiac Enzymes Lab Results   Component Value Date/Time     05/21/2018 12:10 AM     05/20/2018 07:16 PM    CKMB 10.3 (H) 05/21/2018 12:10 AM    CKMB 5.4 (H) 05/20/2018 07:16 PM    CKND1 6.0 (H) 05/21/2018 12:10 AM    CKND1 3.8 05/20/2018 07:16 PM    TROIQ 1.43 (H) 05/21/2018 12:10 AM    TROIQ 0.92 (H) 05/20/2018 07:16 PM    TNIPOC 0.50 (HH) 05/20/2018 07:27 PM      Coagulation No results for input(s): PTP, INR, APTT in the last 72 hours.     No lab exists for component: INREXT    Lipid Panel Lab Results   Component Value Date/Time    Cholesterol, total PENDING 05/21/2018 06:23 AM    HDL Cholesterol PENDING 05/21/2018 06:23 AM    LDL, calculated PENDING 05/21/2018 06:23 AM    VLDL, calculated PENDING 05/21/2018 06:23 AM    Triglyceride PENDING 05/21/2018 06:23 AM    CHOL/HDL Ratio PENDING 05/21/2018 06:23 AM      BNP No results found for: BNP, BNPP, XBNPT   Liver Enzymes Recent Labs      05/21/18   0623   TP  7.3   ALB  3.4   AP  114   SGOT  560*      Digoxin    Thyroid Studies Lab Results   Component Value Date/Time    TSH 1.01 05/21/2018 06:23 AM

## 2018-05-21 NOTE — PROGRESS NOTES
conducted a Follow up consultation and Spiritual Assessment for Dylan Flower, who is a 74 y.o.,female. The  provided the following Interventions:  Continued the relationship of care and support. Listened empathically. Offered  assurance of continued prayer on patients behalf. Chart reviewed. The following outcomes were achieved:  Patient's sister expressed gratitude for 's visit. Assessment:  There are no further spiritual or Advent issues which require Spiritual Care Services interventions at this time. Plan:  Chaplains will continue to follow and will provide pastoral care on an as needed/requested basis.  recommends bedside caregivers page  on duty if patient shows signs of acute spiritual or emotional distress.        [id number]   [phone]

## 2018-05-21 NOTE — CONSULTS
[facility]  Pulmonary, Critical Care, and Sleep Medicine      Name: Ailyn Fernando MRN: [id number]   : 1950 Hospital: [address] [place] [address]   Date: 2018          Critical Care Initial Patient Consult    Requesting MD:  Dr. Miguelina Owens                                                Reason for CC Consult: vent management    IMPRESSION:   · STEMI, initially to anteroseptal EKG leads, s/p PCI and stenting of proximal LAD with 90-95% stenosis followed by EKG changes to anterolateral leads with emergent repeat PCI and stenting to ostial and mid LAD  · Acute hypoxic respiratory failure, intubated for unresponsiveness, CXR with worsening bilateral infiltrates, high suspicion for acute pulmonary edema preceded by hypertensive emergency and underlying CAD, cannot exclude COPD exacerbation although less likely  · Acute on chronic kidney disease  · Acute metabolic acidosis  · Hx COPD  · Hx DM, last hgb A1c () 6.6  · Hx HTN; dilated cardiomyopathy with prior EF 45-50% () with Gr 1 diastolic dysfunction; mild to moderate mitral valve regurgitation  · Hx cervical spondylosis, chronic pain syndrome      RECOMMENDATIONS:   · Resp - VAP bundle; repeat ABG today and titrate FiO2 to keep SpO2 > 90%. Scheduled bronchodilator; wean IV steroid. SBT as appropriate - will need close monitoring of oxygenation   · ID - no suspicion for infectious process. Trend WBC, temp curve and consider panculture if with s/sx of infection  · CVS -hemodynamically stable. Monitor for recurrence of bradycardia, HTN. Defer STEMI/CAD management to Cardiology -currently on Integrilin infusion; off nitroglycerin gtt. Awaiting echo   · Heme/Onc- stable hgb/hct, plt; monitor for gross active bleeding. Groin site for arterial/ venous sheath without active bleed/ hematoma  · Metabolic - replace Mg 2g IV. Serial trend of chemistries (BMP, Mg, Phos) with appropriate replacement -next drawing at 1000.   · Renal - soto cath; strict I/O  · Endocrine - glycemic control with sliding scale insulin. Obtain TSH  · Neuro/ Pain/ Sedation -sedation with propofol infusion, prn versed and fentanyl, target RASS 0 to -1. Daily sedation holiday   · GI - NPO for now. If patient remains intubated, eval for enteral nutrition today thru OGT  · Prophylaxis - DVT, GI     Subjective/History: This patient has been seen and evaluated at the request of Dr. Fernando Guy for ventilator management. Patient is a 74 y.o. female with hx of COPD, Hx DM, HTN, dilated cardiomyopathy with prior EF 45-50% (2015) with Gr 1 diastolic dysfunction; mild to moderate mitral valve regurgitation; cervical spondylosis, chronic pain syndrome presented to the ED with chest pain with associated right arm pain per chart review; found to have STEMI for which she underwent emergent cath with findings of single vessel CAD to LAD s/p stenting of proximal LAD. Pt was transferred to CVT ICU and shortly thereafter started to have wheezing and increasing tachypnea and noted to have elevated BP. Pt was given lasix 80 mg IV total, morphine 1 mg IV, labetalol 20 mg IV and breathing treatment per hospitalist. Pt noted to have gone into bradycardia with HR around 48 and then pt became unresponsive. Code blue was called however pt had a pulse the entire time per RN bedside. Pt was intubated by anesthesia for unresponsiveness. Call then was made at around [address] to [facility] for vent management. Serial EKG done for rhythm changes on telemetry monitoring prompting a repeat STEMI alert. Pt underwent repeat cardiac catheterization with stenting to the ostial and mid-LAD. Pt was transferred to ICU on ventilator and sedated on propofol infusion. Since insertion of soto catheter prior to 2nd cath lab, pt had produced over a liter of dilute urine    The patient is critically ill and can not provide additional history as pt is Ventilated and sedated.      Past Medical History:   Diagnosis Date    Abnormal WBC count 5/17/2016    Bilateral shoulder pain 9/27/2016    Chondromalacia of both patellae     Chronic lung disease     Chronic obstructive pulmonary disease ([facility] 5/17/2016    COPD (chronic obstructive pulmonary disease) ([facility] 9/2015    mild-mod dz; Dr Valentino Phlegm    Diabetes [facility]) 2013    Diabetes mellitus ([facility]     [facility] 2016    [facility]ilated [facility]     Dyslipidemia     History of echocardiogram 11/14/2014    Mild LVE. EF 40%. Mild, diffuse hypk. Mild LAE. Mild MR.      Hypercholesteremia 1/08/14    Hypertension 2000    Noncompliance with medications 2/16/2016    Obesity     Orthostatic hypotension 5/17/2016    Osteoarthritis of both knees     Pain management 04/01/2016    Dr. Matthew Parson     Popliteal cyst, bilateral      Vitamin D deficiency 10/16/14      History reviewed. No pertinent surgical history. Prior to Admission medications    Medication Sig Start Date End Date Taking? Authorizing Provider   insulin detemir (LEVEMIR) 100 unit/mL injection 15 units daily for diabetes 7/20/17   Ellie Olvera, NP   albuterol (PROVENTIL HFA, VENTOLIN HFA, PROAIR HFA) 90 mcg/actuation inhaler Take 2 Puffs by inhalation every four (4) hours as needed for Wheezing. 7/20/17   Ellie Olvera, NP   omega-3 acid ethyl esters (LOVAZA) 1 gram capsule Take 2 Caps by mouth daily (with breakfast). 7/20/17   Ellie Olvera, NP   amLODIPine-atorvastatin (CADUET) 10-80 mg per tablet Take 1 Tab by mouth daily. 7/20/17   Ellie Olvera, NP   lisinopril (PRINIVIL, ZESTRIL) 20 mg tablet Take 1 Tab by mouth daily. For blood pressure 4/26/17   Janee Irizarry NP   magnesium oxide (MAG-OX) 400 mg tablet Take 1 Tab by mouth daily. For low magnesium 4/26/17   Janee Irizarry NP   desloratadine (CLARINEX) 5 mg tablet Take 1 Tab by mouth daily as needed for Allergies.  4/26/17   Ellie Olvera NP   mometasone (NASONEX) 50 mcg/actuation nasal spray 2 Sprays by Both Nostrils route daily as needed. For allergies 4/26/17   Angie Teshano Irizarry NP   umeclidinium-vilanterol [facility] ELLIPTA) 62.5-25 mcg/actuation inhaler Take 1 Puff by inhalation daily. For COPD 4/26/17   Angie Irizarry NP   traMADol (ULTRAM) 50 mg tablet Take 1 Tab by mouth every six (6) hours as needed. Max Daily Amount: 200 mg. For arthritic pains 4/26/17   Colonel Yadi NP   esomeprazole (NEXIUM) 40 mg capsule Take 1 Cap by mouth daily as needed. For reflux 4/26/17   Colonel Yadi NP   albuterol (PROVENTIL VENTOLIN) 2.5 mg /3 mL (0.083 %) nebulizer solution 3 mL by Nebulization route every four (4) hours as needed for Wheezing. 9/27/16   Colonel Yadi NP   ferrous sulfate 325 mg (65 mg iron) tablet Take 1 Tab by mouth two (2) times a day.  Indications: IRON DEFICIENCY ANEMIA 10/22/15   Colonel Yadi NP     Current Facility-Administered Medications   Medication Dose Route Frequency    propofol (DIPRIVAN) infusion  0-50 mcg/kg/min IntraVENous TITRATE    nitroglycerine compounded injection        eptifibatide (INTEGRILIN) 0.75 mg/mL infusion  1 mcg/kg/min IntraVENous CONTINUOUS    insulin lispro (HUMALOG) injection   SubCUTAneous Q6H    chlorhexidine (PERIDEX) 0.12 % mouthwash 10 mL  10 mL Oral ONCE    chlorhexidine (PERIDEX) 0.12 % mouthwash 10 mL  10 mL Oral Q12H    albuterol-ipratropium (DUO-NEB) 2.5 MG-0.5 MG/3 ML  3 mL Nebulization Q4H RT    magnesium sulfate 2 g/50 ml IVPB (premix or compounded)  2 g IntraVENous ONCE    verapamil (ISOPTIN) 2.5 mg/mL injection 2.5-5 mg  2.5-5 mg IntraVENous ONCE    ferrous sulfate tablet 325 mg  325 mg Oral BID    insulin glargine (LANTUS) injection 10 Units  10 Units SubCUTAneous DAILY    nicotine (NICODERM CQ) 14 mg/24 hr patch 1 Patch  1 Patch TransDERmal Q24H    aspirin chewable tablet 81 mg  81 mg Oral DAILY    methylPREDNISolone (PF) (SOLU-MEDROL) injection 40 mg  40 mg IntraVENous Q8H    sodium chloride (NS) flush 5-10 mL  5-10 mL IntraVENous Q8H    ticagrelor (BRILINTA) tablet 90 mg  90 mg Oral BID    atorvastatin (LIPITOR) tablet 80 mg  80 mg Oral QHS     No Known Allergies   Social History   Substance Use Topics    Smoking status: Former Smoker     Packs/day: 0.25     Years: 45.00     Types: Cigarettes     Quit date: 2015    Smokeless tobacco: Not on file    Alcohol use No      Family History   Problem Relation Age of Onset    Diabetes Mother     Hypertension Mother     Hypertension Father     Kidney Disease Maternal Aunt 48     Dialysis        Review of Systems:  Review of systems not obtained due to patient factors. Objective:   Vital Signs:    Visit Vitals    BP 99/59    Pulse 82    Temp 98 °F (36.7 °C)    Resp 16    Wt 98.9 kg (218 lb 0.6 oz)    SpO2 100%    BMI 37.43 kg/m2       O2 Device: Ventilator, Endotracheal tube   O2 Flow Rate (L/min): 2 l/min   Temp (24hrs), Av.1 °F (36.7 °C), Min:98 °F (36.7 °C), Max:98.2 °F (36.8 °C)       Intake/Output:   Last shift:       190 -  0700  In: 290 [P.O.:240; I.V.:50]  Out: 1750 [Urine:1750]  Last 3 shifts:      Intake/Output Summary (Last 24 hours) at 18 032  Last data filed at 18 0321   Gross per 24 hour   Intake              290 ml   Output             1750 ml   Net            -1460 ml       Ventilator Settings:  Mode Rate Tidal Volume Pressure FiO2 PEEP   Assist control, VC+   450 ml    100 % 8 cm H20     Peak airway pressure: 27 cm H2O    Minute ventilation: 11.6 l/min        Physical Exam:    General:  Sedated, intubated   Head:  Normocephalic, without obvious abnormality, atraumatic. Eyes:  Pink palpebral conjunctivae, anicteric sclerae; + pupils briskly reactive to light b/l    Nose: Nares normal. No drainage    Throat: Intubated    Neck: Supple, symmetrical, trachea midline, no adenopathy, thyroid: no enlargment/tenderness/nodules, no carotid bruit and no JVD.    Lungs:   Symmetrical chest expansion, no accessory muscle use; + diffuse expiratory wheezing on b/l lungs with bibasilar rales   Chest wall:  No deformity. Heart:  Regular rate and rhythm, S1, S2 present   Abdomen:   Soft, Bowel sounds normal.    Extremities: Extremities atraumatic, no cyanosis    Pulses: 2+ and symmetric all extremities. Skin: Skin color, texture, turgor normal. No rashes or lesions. No hematoma surrounding groin lines   Lymph nodes: Cervical, supraclavicular nodes normal.   Neurologic: Sedated        Data:     Recent Results (from the past 24 hour(s))   EKG, 12 LEAD, INITIAL    Collection Time: 05/20/18  7:15 PM   Result Value Ref Range    Ventricular Rate 109 BPM    Atrial Rate 109 BPM    P-R Interval 152 ms    QRS Duration 80 ms    Q-T Interval 316 ms    QTC Calculation (Bezet) 425 ms    Calculated P Axis 81 degrees    Calculated R Axis 23 degrees    Calculated T Axis -165 degrees    Diagnosis       Sinus tachycardia  Possible Left atrial enlargement  Left ventricular hypertrophy  Anteroseptal infarct , possibly acute  Lateral injury pattern  ACUTE MI  Abnormal ECG  When compared with ECG of 03-MAY-2016 08:54,  Significant changes have occurred  Confirmed by Tiffanie Morris ([id number]) on 5/20/2018 9:18:12 PM     CBC WITH AUTOMATED DIFF    Collection Time: 05/20/18  7:16 PM   Result Value Ref Range    WBC 4.8 4.6 - 13.2 K/uL    RBC 4.23 4.20 - 5.30 M/uL    HGB 11.6 (L) 12.0 - 16.0 g/dL    HCT 36.2 35.0 - 45.0 %    MCV 85.6 74.0 - 97.0 FL    MCH 27.4 24.0 - 34.0 PG    MCHC 32.0 31.0 - 37.0 g/dL    RDW 16.6 (H) 11.6 - 14.5 %    PLATELET 824 534 - 571 K/uL    MPV 9.7 9.2 - 11.8 FL    NEUTROPHILS 59 40 - 73 %    LYMPHOCYTES 28 21 - 52 %    MONOCYTES 8 3 - 10 %    EOSINOPHILS 5 0 - 5 %    BASOPHILS 0 0 - 2 %    ABS. NEUTROPHILS 2.8 1.8 - 8.0 K/UL    ABS. LYMPHOCYTES 1.3 0.9 - 3.6 K/UL    ABS. MONOCYTES 0.4 0.05 - 1.2 K/UL    ABS. EOSINOPHILS 0.2 0.0 - 0.4 K/UL    ABS.  BASOPHILS 0.0 0.0 - 0.1 K/UL    DF AUTOMATED     METABOLIC PANEL, BASIC    Collection Time: 05/20/18  7:16 PM   Result Value Ref Range    Sodium 139 136 - 145 mmol/L    Potassium 4.3 3.5 - 5.5 mmol/L    Chloride 109 (H) 100 - 108 mmol/L    CO2 22 21 - 32 mmol/L    Anion gap 8 3.0 - 18 mmol/L    Glucose 128 (H) 74 - 99 mg/dL    BUN 28 (H) 7.0 - 18 MG/DL    Creatinine 1.61 (H) 0.6 - 1.3 MG/DL    BUN/Creatinine ratio 17 12 - 20      GFR est AA 39 (L) >60 ml/min/1.73m2    GFR est non-AA 32 (L) >60 ml/min/1.73m2    Calcium 9.1 8.5 - 10.1 MG/DL   CARDIAC PANEL,(CK, CKMB & TROPONIN)    Collection Time: 05/20/18  7:16 PM   Result Value Ref Range     26 - 192 U/L    CK - MB 5.4 (H) <3.6 ng/ml    CK-MB Index 3.8 0.0 - 4.0 %    Troponin-I, Qt. 0.92 (H) 0.0 - 0.045 NG/ML   HEPATIC FUNCTION PANEL    Collection Time: 05/20/18  7:16 PM   Result Value Ref Range    Protein, total 7.3 6.4 - 8.2 g/dL    Albumin 3.6 3.4 - 5.0 g/dL    Globulin 3.7 2.0 - 4.0 g/dL    A-G Ratio 1.0 0.8 - 1.7      Bilirubin, total 0.4 0.2 - 1.0 MG/DL    Bilirubin, direct <0.1 0.0 - 0.2 MG/DL    Alk.  phosphatase 112 45 - 117 U/L    AST (SGOT) 22 15 - 37 U/L    ALT (SGPT) 31 13 - 56 U/L   POC TROPONIN-I    Collection Time: 05/20/18  7:27 PM   Result Value Ref Range    Troponin-I (POC) 0.50 (HH) 0.00 - 0.08 ng/mL   POC ACTIVATED CLOTTING TIME    Collection Time: 05/20/18  8:13 PM   Result Value Ref Range    Activated Clotting Time (POC) 158 (H) 79 - 138 SECS   POC ACTIVATED CLOTTING TIME    Collection Time: 05/20/18  8:23 PM   Result Value Ref Range    Activated Clotting Time (POC) 224 (H) 79 - 138 SECS   POC ACTIVATED CLOTTING TIME    Collection Time: 05/20/18  9:02 PM   Result Value Ref Range    Activated Clotting Time (POC) 219 (H) 79 - 138 SECS   GLUCOSE, POC    Collection Time: 05/20/18  9:51 PM   Result Value Ref Range    Glucose (POC) 145 (H) 70 - 110 mg/dL   EKG, 12 LEAD, INITIAL    Collection Time: 05/20/18 11:45 PM   Result Value Ref Range    Ventricular Rate 101 BPM    Atrial Rate 101 BPM    P-R Interval 188 ms    QRS Duration 78 ms    Q-T Interval 366 ms QTC Calculation (Bezet) 474 ms    Calculated P Axis 75 degrees    Calculated R Axis -38 degrees    Calculated T Axis 60 degrees    Diagnosis       Sinus tachycardia  Left axis deviation  Minimal voltage criteria for LVH, may be normal variant  ST elevation, consider anterolateral injury or acute infarct  ACUTE MI  Abnormal ECG  When compared with ECG of 20-MAY-2018 19:15,  Minimal criteria for Septal infarct are no longer present  ST more elevated in Anterolateral leads  T wave inversion no longer evident in Inferior leads  T wave inversion more evident in Anterolateral leads     POC G3    Collection Time: 05/21/18 12:09 AM   Result Value Ref Range    Device: VENT      FIO2 (POC) 100 %    pH (POC) 7.246 (LL) 7.35 - 7.45      pCO2 (POC) 39.6 35.0 - 45.0 MMHG    pO2 (POC) 137 (H) 80 - 100 MMHG    HCO3 (POC) 17.2 (L) 22 - 26 MMOL/L    sO2 (POC) 99 (H) 92 - 97 %    Base deficit (POC) 10 mmol/L    Mode ASSIST CONTROL      Tidal volume 400 ml    Set Rate 16 bpm    PEEP/CPAP (POC) 8 cmH2O    Allens test (POC) YES      Total resp. rate 30      Site LEFT RADIAL      Patient temp.  37.0      Specimen type (POC) ARTERIAL      Performed by [place]     Volume control plus YES     CARDIAC PANEL,(CK, CKMB & TROPONIN)    Collection Time: 05/21/18 12:10 AM   Result Value Ref Range     26 - 192 U/L    CK - MB 10.3 (H) <3.6 ng/ml    CK-MB Index 6.0 (H) 0.0 - 4.0 %    Troponin-I, Qt. 1.43 (H) 0.0 - 0.045 NG/ML   CBC W/O DIFF    Collection Time: 05/21/18 12:10 AM   Result Value Ref Range    WBC 6.5 4.6 - 13.2 K/uL    RBC 4.34 4.20 - 5.30 M/uL    HGB 11.7 (L) 12.0 - 16.0 g/dL    HCT 37.5 35.0 - 45.0 %    MCV 86.4 74.0 - 97.0 FL    MCH 27.0 24.0 - 34.0 PG    MCHC 31.2 31.0 - 37.0 g/dL    RDW 16.4 (H) 11.6 - 14.5 %    PLATELET 180 829 - 310 K/uL    MPV 10.1 9.2 - 29.1 FL   METABOLIC PANEL, BASIC    Collection Time: 05/21/18 12:10 AM   Result Value Ref Range    Sodium 134 (L) 136 - 145 mmol/L    Potassium 6.3 (HH) 3.5 - 5.5 mmol/L Chloride 104 100 - 108 mmol/L    CO2 18 (L) 21 - 32 mmol/L    Anion gap 12 3.0 - 18 mmol/L    Glucose 366 (H) 74 - 99 mg/dL    BUN 30 (H) 7.0 - 18 MG/DL    Creatinine 2.08 (H) 0.6 - 1.3 MG/DL    BUN/Creatinine ratio 14 12 - 20      GFR est AA 29 (L) >60 ml/min/1.73m2    GFR est non-AA 24 (L) >60 ml/min/1.73m2    Calcium 8.2 (L) 8.5 - 10.1 MG/DL   LACTIC ACID    Collection Time: 05/21/18 12:10 AM   Result Value Ref Range    Lactic acid 4.8 (HH) 0.4 - 2.0 MMOL/L   MAGNESIUM    Collection Time: 05/21/18  2:30 AM   Result Value Ref Range    Magnesium 1.8 1.6 - 2.6 mg/dL   PHOSPHORUS    Collection Time: 05/21/18  2:30 AM   Result Value Ref Range    Phosphorus 4.0 2.5 - 4.9 MG/DL   METABOLIC PANEL, BASIC    Collection Time: 05/21/18  2:30 AM   Result Value Ref Range    Sodium 138 136 - 145 mmol/L    Potassium 4.9 3.5 - 5.5 mmol/L    Chloride 105 100 - 108 mmol/L    CO2 23 21 - 32 mmol/L    Anion gap 10 3.0 - 18 mmol/L    Glucose 252 (H) 74 - 99 mg/dL    BUN 31 (H) 7.0 - 18 MG/DL    Creatinine 2.06 (H) 0.6 - 1.3 MG/DL    BUN/Creatinine ratio 15 12 - 20      GFR est AA 29 (L) >60 ml/min/1.73m2    GFR est non-AA 24 (L) >60 ml/min/1.73m2    Calcium 8.0 (L) 8.5 - 10.1 MG/DL   POC G3    Collection Time: 05/21/18  2:37 AM   Result Value Ref Range    Device: VENT      FIO2 (POC) 100 %    pH (POC) 7.240 (LL) 7.35 - 7.45      pCO2 (POC) 43.6 35.0 - 45.0 MMHG    pO2 (POC) 123 (H) 80 - 100 MMHG    HCO3 (POC) 18.7 (L) 22 - 26 MMOL/L    sO2 (POC) 98 (H) 92 - 97 %    Base deficit (POC) 9 mmol/L    Mode ASSIST CONTROL      Tidal volume 400 ml    Set Rate 16 bpm    PEEP/CPAP (POC) 8 cmH2O    Allens test (POC) NO      Total resp. rate 22      Site DRAWN FROM ARTERIAL LINE      Patient temp.  37.0      Specimen type (POC) ARTERIAL      Performed by Ramin Laron     Volume control plus YES     EKG, 12 LEAD, SUBSEQUENT    Collection Time: 05/21/18  2:44 AM   Result Value Ref Range    Ventricular Rate 89 BPM    Atrial Rate 89 BPM    P-R Interval 166 ms    QRS Duration 96 ms    Q-T Interval 420 ms    QTC Calculation (Bezet) 511 ms    Calculated P Axis 76 degrees    Calculated R Axis 60 degrees    Calculated T Axis 79 degrees    Diagnosis       Sinus rhythm with occasional premature ventricular complexes  ST elevation, consider anterolateral injury or acute infarct  Prolonged QT  ACUTE MI  Abnormal ECG  When compared with ECG of 20-MAY-2018 23:45,  premature ventricular complexes are now present  Questionable change in QRS duration               Telemetry:normal sinus rhythm    Imaging:  CXR Results  (Last 48 hours)               05/21/18 0020  XR CHEST PORT Final result    Impression:  IMPRESSION:        1. Endotracheal tube appears to be in satisfactory position. 2. Worsening interstitial and bibasilar opacities, favors edema given rapid   progression but infectious process not entirely excluded. Narrative:  AP CHEST, PORTABLE       INDICATION: Endotracheal tube placement       COMPARISON: Prior chest x-rays, most recent 5/20/2018       FINDINGS:  EKG leads overlie the patient. Endotracheal tube tip projects   approximately 3.3 cm above the ramos. Cardiac silhouette is mildly enlarged. Atherosclerosis noted. Worsening of   diffuse interstitial edema and increased confluence of bibasilar hazy opacities. No definite pleural effusion or pneumothorax. No acute osseous abnormalities are   identified. 05/20/18 1937  XR CHEST PORT Final result    Impression:  IMPRESSION:        1. Diffuse prominence of the bronchovascular markings, probably represents   interstitial edema but atypical infectious process/bronchitis not excluded. 2. Mild bibasilar hazy opacities, possibly atelectasis or developing   edema/infiltrate.                                Narrative:  AP CHEST, PORTABLE       INDICATION: Severe chest pain and shortness of breath density and 9, right arm   pain that began 2 days ago COMPARISON: Prior chest x-rays, most recent 5/3/2016       FINDINGS:  EKG leads overlie the patient. The cardiac silhouette is normal in size. Atherosclerosis noted. Prominence and   indistinctness of the bronchovascular markings, greatest at the lung bases. Mild   bibasilar haziness. Costophrenic sulci appear sharp. No evidence for   pneumothorax. Spondylosis and degenerative changes of the shoulders are   suboptimally evaluated. No acute osseous abnormalities are identified.                  CT Results  (Last 48 hours)    None                  January-Nalini GRANT PA-C

## 2018-05-21 NOTE — PROGRESS NOTES
2140 - Pt arrived to floor from Cath Lab. Right groin checked with Cath team. Pt is awake and talking. Pt connected to monitor. Dr. Freddy Burton called to inform for pt to be on Nitro to keep SBP < 130. Stated chest pain better after stent but pt still having right arm/shoulder pain. /83, started Nitro at 10 mcg/min. Pulses palpable BL feet. Pt stated needed to use bedpan. Voided 600. Pt c/o of right arm pain. Pain medication given  2204 -  at bedside. PO medications given. Provided Ice water. Nicotine patch placed on right upper arm.   2211 - /91  2215 - /91 Pt talking on cell phone to multi family about what happen. 2230 - /91 Discussed with pt about she needs to rest and stop talking on her phone, discussed importance of keeping blood pressure down.  took phone away from pt. PRN Hydralazine given. Audible wheezing heard at bedside asked if needed breathing treatment. Pt states has COPD and does breathing treatments at home as well. 2238 - Breathing treatment given. 2245 - Pt getting very anxious, stating can't breath. O2 sats 95 and above. Suzie, RN came in room to assist with pt. Both nurses and  trying to keep pt calm and breath in breathing treatment. Pt attempting to get out of bed, states needs to walk. Explained on bedrest, just had cardiac procedure. Pt will not calm down. 46 - Talked with Dr. Dinorah Hunt about respiratory concerns, anxiety and elevated BP. Order for 40 mg lasix and he advised on his way up to see pt. /117 Nitro increased to max 20 mcg/min  2259 - Dr. Pierre Rosario at bedside. /131. Ordered another 40 mg of lasix. 2302 - /98 Then ordered 20 mg of labetalol. Pt still very anxious, all over the bed. Tried non-rebreather, pt removed did not like having the mask on her face. Complained of being hot. 1mg of morphine given per verbal order by Dr. Dinorah Hunt. Pt still sounds very tight and wheezing. NS stopped.    2308 - This nurse currently at nurses station trying to get cardiology on the phone for Dr. Sonali Brady when Monty Heart yelled from the room to get atropine and pushed the code blue Pt went unresponsive then bradycardiac in the 40s. 1 atropine given. /70. Pulse never lost.  at bedside became very upset seeing his wife in this state. Pt did stop breathing, sternal rub completed by Katia Noland, FLORENCIA to try to arouse pt.  became violent pushing and shoving at Dr. Kenyon Phuong raised arm back like he was attempting to swing which was intercepted by Katia Noland, FLORENCIA. Staff by CVT stepdown came to assist and get  out of room. RT at bedside bagging pt.   2318 - , /94 [place] [facility] came to code, Dr. Sonali Brady still at bedside managing so they left. Nursing supervisor arrived. Page out to Cardiology through Answering service. Advised wanted Dr. Olman Alves but answering service advsied Dr. Tiffany Dela Cruz was on call. Dr. Sonali Brady talked with Dr. Tiffany Dela Cruz via telephone. Dr. Tiffany Dela Cruz advised was not familiar with pt.   2325 - , /114 RT and anesthetist at bedside currently being bagged. Pt not breathing effectively, Dr. Sonali Brady decided to intubate pt.   2330 - Pt intubated. Dr. Sonali Brady discussed with Dr. Olman Alves via telephone. EKG performed due to rhythm changes. 2345 - Another EKG performed, as it appears ST elevations have worsen. All EKGs shown to Dr. Sonali Brady. Code STEMI called. Dr. Olman Alves is on his way in  [facility] consulted by Dr. Sonali Brady. Labs order, portable chest xray ordered, and ABG ordered. /73, Nitro turned down to 10 mcg/min  0030 - BP 99/59 Nitro turned off.  at bedside with Dr. Olman Alves and cath team. Pt becoming more awake and squeezing hand. Order for wrist restraints to prevent pulling out ET and propofol to keep calm by Dr. Sonali Brady. 8180 - Pt left via bed to Cath Lab  0240- Pt returned back to room. 2153 - Pt's  and sister at bedside. Card with unit phone number and code given to .  is taking pt's cell phone and  home with him.   0500 - Pt bathed, new linens. Changed dressing to L groin at sheath site due to some bloody drainage.  Leg immobilizer added since pt kept moving and bending leg.   0600 - Adjusted FIO2 to 80% [facility] [place]

## 2018-05-21 NOTE — ANESTHESIA PREPROCEDURE EVALUATION
Anesthetic History               Review of Systems / Medical History  Patient summary reviewed    Pulmonary    COPD               Neuro/Psych   Within defined limits           Cardiovascular          CHF: NYHA Classification II    Past MI    Exercise tolerance: <4 METS     GI/Hepatic/Renal                Endo/Other    Diabetes: poorly controlled, type 2, using insulin    Morbid obesity     Other Findings            Physical Exam    Airway  Mallampati: II  TM Distance: 4 - 6 cm  Neck ROM: normal range of motion   Mouth opening: Normal     Cardiovascular    Rhythm: irregular  Rate: abnormal         Dental    Dentition: Edentulous     Pulmonary      Decreased breath sounds           Abdominal    Distended     Other Findings            Anesthetic Plan    ASA: 4, emergent          Post procedure ventilation   Induction: Intravenous

## 2018-05-21 NOTE — PROGRESS NOTES
PCCM Update    Briefly pt presented to the ED for chest pain, found to have STEMI for which she underwent emergent cath with findings of single vessel CAD to LAD s/p stenting. Pt was transferred to CVT ICU and shortly thereafter started to have wheezing and increasing tachypnea. Pt was given lasix 80 mg IV total, morphine 1 mg IV, labetalol 20 mg IV and breathing treatment per hospitalist. Pt noted to have gone into bradycardia with HR around 48 and then pt became unresponsive. Code blue was called however pt had a pulse the entire time per RN bedside. Pt was intubated by anesthesia for unresponsiveness. Call then was made at around [id number] to [facility] for vent management. Pt already sedated on propofol at 20 mcg/kg/min upon my arrival. Pt apparently had awakened post-intubation; tracking  and staff. STEMI was called with changes in rhythm -ST elevation on anterolateral leads - Cardiologist Dr. Candida Jaramillo called by nursing staff and will be taking pt to cath lab again. · VAP bundle  · CXR - ETT in appropriate place  · Titrate FiO2 to keep SpO2 > 90%  · Sedation - will be re-assessed once patient returns from cath lab  · Recommend more access for patient (central line, art line)   Full consult to follow.     Jony Taylor PA-C  12:38 AM

## 2018-05-21 NOTE — ANESTHESIA PROCEDURE NOTES
Emergent Intubation  Performed by: Linda Johnson  Authorized by: Linda Johnson     Emergent Intubation:   Location:  ICU  Date/Time:  5/20/2018 11:30 PM  Indications:  Impending respiratory failure  Spontaneous Ventilation: present    Level of Consciousness: unresponsive  Preoxygenated: Yes      Airway Documentation:   Airway:  ETT - Cuffed  Technique:  Direct laryngoscopy  Blade Type:  Sue  Blade Size:  4  ETT size (mm):  7.5  ETT Line Bebeto:  Lips  ETT Insertion depth (cm):  22  Placement verified by: auscultation, EtCO2 and BBS    Attempts:  1  Difficult airway: No    Pt was intubated at the request of the hospitalist for unresponsiveness and recent STEMI, her LAD was stented earlier this evening for a 95% occlusion.

## 2018-05-21 NOTE — PROCEDURES
cardiac cath report. PreOpDiagnosis:   Acute pulmonary edema and ST elevation    Procedures:  * Selective Left Coronary Angiography  * Conscious sedation using Propofol  * PCI and stenting of Ostial LAD using 3.0 X 8 mm RESOLUTE LAINA stent ( overlapping with proximal stent)  * PCI and stenting of mid LAD using 2.75 X 8 mm RESOLUTE LAINA stent ( overlapping with proximal stent)  * Placement of left femoral central venous catheter    Moderate sedation time ~ 60 minutes. Procedure detail:  Risks, benefits, and alternatives and complications of procedure were explained to the  and sister and  appropriate informed consent was obtained prior to the procedure. The patient was brought to the cath lab and was prepped and draped in the usual sterile manner.  Moderate sedation was achieved with the appropriate medications. Lidocaine was used to secure local anesthesia. The left femoral artery was cannulated using a modified Seldinger technique and a 7 Lao sheath was placed.   LHC was performed using 7F XB 3.5 catheter under fluoroscopic guidance    Interventional procedure detail:  Total dose of IV Heparin 8000 units was given and ACT was checked accordingly throughout procedure. Guide catheter 7F used to selectively cannulate LM. Proximal edge stent thrombosis noted. Concern was possible dissection which may have caused by proximal stent  The  lesion in the LAD coronary artery was crossed with a 0.014\" Pro-water guide wire without difficulty. BMW wire placed in LCx  Pre-dilation was performed using  3.0 X 12 mm EMERGE balloon.   * PCI and stenting of Ostial LAD using 3.0 X 8 mm RESOLUTE LAINA stent ( overlapping with proximal stent)  * PCI and stenting of mid LAD using 2.75 X 8 mm RESOLUTE LAINA stent ( overlapping with proximal stent)  Post-dilation of ostial LAD stent and distal stent portion of stents were done by 3.25 X 12 mm NC and mid stent was post-dilated with 3.5 X 12 mm NC ballon   Intracoronary NTG was used as needed during the procedure. ISRRAEL 0 flow before and ISRRAEL 3 flow after procedure  Stenosis reduction from 100% to 0%  After an appropriate waiting phase final coronary angiograms were obtained and the catheter was withdrawn. No immediate complication noted  No major hamatoma or bleeding immediately. Closure: Sheath left in place to be removed in morning.  For BP monitoring  Antiplatelet therapy given: IV integrillin bolus X 2 and infusion   Estimated Blood Loss: Minimal  Specimens: None  Assistants: Per MacLab  Complications: None    Clark Ahn MD  5/21/2018, 2:02 AM

## 2018-05-21 NOTE — H&P
History & Physical    Patient: Melida Osborne MRN: [id number]  CSN: [id number]    YOB: 1950  Age: 74 y.o. Sex: female      DOA: 5/20/2018    Chief Complaint:   Chief Complaint   Patient presents with    Chest Pain          HPI:     Melida Osborne is a 74 y.o.  female who has PMH of COPD , HTN, Tobacco abuse and DM , non compliance  Pt Presented to ER with Lt shoulder pain x 2 days on and off that got progressively worse . Pt then developed Lt sided chest pain and heaviness and worsening SOB . In ER, Pt received ASA and NG and pain level went down from 10 to 7/10  In ER, Pt had +ve Troponin and EKG showed NSTEMI   Pt was also found to have heavy wheezing and CXR was +ve for Pulmonary edema. Pt was given solumedrol x 1 and Lasix 20 mg IV and was then rushed to Cath lab  Continued to have 5/10 chest pain and mild SOB prior to her transfer to cath lab. Pt's  is at bedside. Pt denies Hx of drug or alcohol abuse     Past Medical History:   Diagnosis Date    Abnormal WBC count 5/17/2016    Bilateral shoulder pain 9/27/2016    Chondromalacia of both patellae     Chronic lung disease     Chronic obstructive pulmonary disease ([address] 5/17/2016    COPD (chronic obstructive pulmonary disease) ([facility] 9/2015    mild-mod dz; Dr Kahlil Evans    Diabetes [facility]) 2013    Diabetes mellitus ([facility]     Diabetic eye exam ([facility] 2016    Dilated cardiomyopathy ([facility]     Dyslipidemia     History of echocardiogram 11/14/2014    Mild LVE. EF 40%. Mild, diffuse hypk. Mild LAE. Mild MR.      Hypercholesteremia 1/08/14    Hypertension 2000    Noncompliance with medications 2/16/2016    Obesity     Orthostatic hypotension 5/17/2016    Osteoarthritis of both knees     Pain management 04/01/2016    Dr. Cassidy Ramirez     Popliteal cyst, bilateral      Vitamin D deficiency 10/16/14       History reviewed. No pertinent surgical history.     Family History   Problem Relation Age of Onset    Diabetes Mother     Hypertension Mother     Hypertension Father     Kidney Disease Maternal Aunt 48     Dialysis       Social History     Social History    Marital status: LEGALLY      Spouse name: N/A    Number of children: 3    Years of education: N/A     Occupational History    retired      Social History Main Topics    Smoking status: Former Smoker     Packs/day: 0.25     Years: 45.00     Types: Cigarettes     Quit date: 8/17/2015    Smokeless tobacco: None    Alcohol use No    Drug use: No    Sexual activity: Yes     Other Topics Concern     Service No    Blood Transfusions No    Caffeine Concern No    Occupational Exposure No    Hobby Hazards No    Sleep Concern No    Stress Concern No    Weight Concern No    Special Diet No    Back Care No    Exercise No    Bike Helmet No    Seat Belt Yes    Self-Exams Yes     Social History Narrative       Prior to Admission medications    Medication Sig Start Date End Date Taking? Authorizing Provider   insulin detemir (LEVEMIR) 100 unit/mL injection 15 units daily for diabetes 7/20/17   Virgil Mckeon NP   albuterol (PROVENTIL HFA, VENTOLIN HFA, PROAIR HFA) 90 mcg/actuation inhaler Take 2 Puffs by inhalation every four (4) hours as needed for Wheezing. 7/20/17   Virgil Mckeon NP   omega-3 acid ethyl esters (LOVAZA) 1 gram capsule Take 2 Caps by mouth daily (with breakfast). 7/20/17   Virgil Mckeon NP   amLODIPine-atorvastatin (CADUET) 10-80 mg per tablet Take 1 Tab by mouth daily. 7/20/17   Virgil Mckeon NP   lisinopril (PRINIVIL, ZESTRIL) 20 mg tablet Take 1 Tab by mouth daily. For blood pressure 4/26/17   Chapis Irizarry NP   magnesium oxide (MAG-OX) 400 mg tablet Take 1 Tab by mouth daily. For low magnesium 4/26/17   Chapis Irizarry NP   desloratadine (CLARINEX) 5 mg tablet Take 1 Tab by mouth daily as needed for Allergies.  4/26/17   Virgil Mckeon NP   mometasone (NASONEX) 50 mcg/actuation nasal spray 2 Sprays by Both Nostrils route daily as needed. For allergies 4/26/17   Sonia Irizarry, NP   umeclidinium-vilanterol [facility] ELLIPTA) 62.5-25 mcg/actuation inhaler Take 1 Puff by inhalation daily. For COPD 4/26/17   Sonia Irizarry, NP   traMADol (ULTRAM) 50 mg tablet Take 1 Tab by mouth every six (6) hours as needed. Max Daily Amount: 200 mg. For arthritic pains 4/26/17   Ruthell Keepers, NP   esomeprazole (NEXIUM) 40 mg capsule Take 1 Cap by mouth daily as needed. For reflux 4/26/17   Ruthell Keepers, NP   albuterol (PROVENTIL VENTOLIN) 2.5 mg /3 mL (0.083 %) nebulizer solution 3 mL by Nebulization route every four (4) hours as needed for Wheezing. 9/27/16   Ruthell Keepers, NP   ferrous sulfate 325 mg (65 mg iron) tablet Take 1 Tab by mouth two (2) times a day. Indications: IRON DEFICIENCY ANEMIA 10/22/15   Ruthell Keepers, NP       No Known Allergies      Review of Systems  GENERAL: Patient alert, awake and oriented times 3, able to communicate full sentences but in distress 2 ry to pain   HEENT: No change in vision, no earache, tinnitus, sore throat or sinus congestion. NECK: No pain or stiffness. PULMONARY: +ve shortness of breath,  With cough and  wheeze. Cardiovascular: no pnd / orthopnea, no CP  GASTROINTESTINAL: No abdominal pain, nausea, vomiting or diarrhea, melena or bright red blood per rectum. GENITOURINARY: No urinary frequency, urgency, hesitancy or dysuria. MUSCULOSKELETAL: No joint or muscle pain, no back pain, no recent trauma. DERMATOLOGIC: No rash, no itching, no lesions. ENDOCRINE: No polyuria, polydipsia, no heat or cold intolerance. No recent change in weight. HEMATOLOGICAL: No anemia or easy bruising or bleeding. NEUROLOGIC: No headache, seizures, numbness, tingling or weakness.        Physical Exam:     Physical Exam:  Visit Vitals    BP (!) 165/91    Pulse (!) 105    Temp 98.2 °F (36.8 °C)    Resp 18    Wt 93.4 kg (206 lb)    SpO2 99%    BMI 35.36 kg/m2    O2 Flow Rate (L/min): 2 l/min O2 Device: Nasal cannula    Temp (24hrs), Av.2 °F (36.8 °C), Min:98.2 °F (36.8 °C), Max:98.2 °F (36.8 °C)             General:  Alert, cooperative, in distress, appears stated age. Head: Normocephalic, without obvious abnormality, atraumatic. Eyes:  Conjunctivae/corneas clear. PERRL, EOMs intact. Nose: Nares normal. No drainage or sinus tenderness. Neck: Supple, symmetrical, trachea midline, no adenopathy, thyroid: no enlargement, no carotid bruit and no JVD. Lungs:   Heavy wheezing with Bibasilar crackles    Heart:  Regular rate and rhythm, S1, S2 normal.     Abdomen: Soft, non-tender. Bowel sounds normal.    Extremities: Extremities normal, atraumatic, no cyanosis or edema. Pulses: 2+ and symmetric all extremities. Skin:  No rashes or lesions   Neurologic: AAOx3, No focal motor or sensory deficit. Labs Reviewed: All lab results for the last 24 hours reviewed.   CXR and EKG    Procedures/imaging: see electronic medical records for all procedures/Xrays and details which were not copied into this note but were reviewed prior to creation of Plan      Assessment/Plan     Principal Problem:    [facility] ([facility])    Active Problems:    Cardiomyopathy, dilated ([facility] (2014)      Diabetes mellitus type 2, insulin dependent ([facility] (2015)      COPD (chronic obstructive pulmonary disease) ([facility] (2015)      Overview: mild-mod dz; Dr Kvng Vera hypertension (2015)      Acute pulmonary edema ([facility] (2018)       Pt is admitted for STEMI >> cardiac cath >> ICU  Positive troponin 0.52>>0.9  ST elevation VI, VII and ST deptessions in I, II leads  Pulmonary edema  HTN uncontrolled  DM with hyperglycemia  Tobacco abuse  COPD with heavy wheezing     Pt is on her way to cardiac cath  Lasix 20 x 1  Solumedrol   Respiratory support      DVT/GI Prophylaxis: Heparin    Plan of care is discussed in details with Patient/Family at bedside and agreed upon    Jerry Hayes MD  5/20/2018 8:18 PM

## 2018-05-21 NOTE — PROGRESS NOTES
NUTRITION    Nutrition Screen      RECOMMENDATIONS / PLAN:     - Recommend starting tube feeding of Vital High Protein at 20 mL/hr and advance as tolerated by 10 mL q 4 hours to 40 mL/hr with 100 mL q 4 hour water flushes. Increase to goal rate of 60 mL/hr once propofol is stopped. - Continue RD inpatient monitoring and evaluation. Goal Regimen: Vital High Protein at 60 mL/hr + 100 mL q 4 hour water flushes to provide: 1440 kcal, 126 gm protein, 161 gm CHO, 0 gm fiber, 1204 mL free water, 1804 mL total water, 100% RDIs     NUTRITION INTERVENTIONS & DIAGNOSIS:     [x] Enteral nutrition: recommendations above     Nutrition Diagnosis: Inadequate oral intake related to respiratory status as evidenced by pt NPO, intubated. ASSESSMENT:     Pt intubated for acute respiratory distress. OGT clamped. Average po intake adequate to meet patients estimated nutritional needs:   [] Yes     [x] No   [] Unable to determine at this time    Diet: DIET NPO      Food Allergies: NKFA  Current Appetite:   [] Good     [] Fair     [] Poor     [x] Other: NPO  Appetite/meal intake prior to admission:   [] Good     [] Fair     [] Poor     [x] Other: unknown   Feeding Limitations:  [] Swallowing difficulty    [] Chewing difficulty    [x] Other: respiratory status  Current Meal Intake: No data found.     BM: PTA   Skin Integrity: WDL  Edema: 3+ pitting LEs  Pertinent Medications: Reviewed: colace, ferrous sulfate, lasix, lantus, SSI, steroid, propofol at 25 mcg/kg/min (391 kcal per day)     Recent Labs      05/21/18   1125  05/21/18   1030  05/21/18   0623  05/21/18   0230   05/20/18   1916   NA   --   137  138  138   < >  139   K  5.3  6.0*  5.2  4.9   < >  4.3   CL   --   106  105  105   < >  109*   CO2   --   20*  23  23   < >  22   GLU   --   182*  165*  252*   < >  128*   BUN   --   35*  32*  31*   < >  28*   CREA   --   1.73*  1.95*  2.06*   < >  1.61*   CA   --   8.3*  8.7  8.0*   < >  9.1   MG   --   2.4  2.7*  1.8   --    -- PHOS   --   4.0  4.7  4.0   --    --    ALB   --    --   3.4   --    --   3.6   SGOT   --    --   560*   --    --   22   ALT   --    --   76*   --    --   31    < > = values in this interval not displayed. Intake/Output Summary (Last 24 hours) at 05/21/18 1425  Last data filed at 05/21/18 1400   Gross per 24 hour   Intake           467.99 ml   Output             3515 ml   Net         -3047.01 ml       Anthropometrics:  Ht Readings from Last 1 Encounters:   05/20/18 5' 4\" (1.626 m)     Last 3 Recorded Weights in this Encounter    05/20/18 1917 05/20/18 2214   Weight: 93.4 kg (206 lb) 98.9 kg (218 lb 0.6 oz)     Body mass index is 37.43 kg/(m^2). Obese, Class II     Weight History:   Weight Metrics 5/20/2018 4/26/2017 1/4/2017 9/27/2016 8/30/2016 8/3/2016 6/8/2016   Weight 218 lb 0.6 oz 191 lb 12.8 oz 198 lb 9.6 oz 195 lb 197 lb 197 lb 215 lb   BMI 37.43 kg/m2 32.92 kg/m2 34.09 kg/m2 33.47 kg/m2 33.81 kg/m2 33.8 kg/m2 36.89 kg/m2        Admitting Diagnosis: STEMI (ST elevation myocardial infarction) ([facility] [address]  Pertinent PMHx: COPD, HTN, DM, dyslipidemia, cardiomyopathy      Education Needs:        [x] None identified  [] Identified - Not appropriate at this time  []  Identified and addressed - refer to education log  Learning Limitations:   [] None identified  [x] Identified: intubated     Cultural, Manda[place] & ethnic food preferences:  [x] None identified    [] Identified and addressed     ESTIMATED NUTRITION NEEDS:     Calories: 3334-4832 kcal (11-14 kcal/kg) based on  [x] Actual BW 99 kg     [] IBW   Protein: 110-138 gm (2-2.5 gm/kg) based on  [] Actual BW      [x] IBW 55 kg  Fluid: 1 mL/kcal     MONITORING & EVALUATION:     Nutrition Goal(s):   1. Nutritional needs will be met through adequate oral intake or nutrition support within the next 7 days.   Outcome:  [] Met/Ongoing    []  Not Met    [x] New/Initial Goal      Monitoring:   [] Food and beverage intake   [] Diet order   [x] Nutrition-focused physical findings   [x] Treatment/therapy   [] Weight   [] Enteral nutrition intake        Previous Recommendations (for follow-up assessments only):     []   Implemented       []   Not Implemented (RD to address)      [] No Longer Appropriate     [] No Recommendation Made     Discharge Planning: pending ability to tolerate oral diet and goals of care  [x] Participated in care planning, discharge planning, & interdisciplinary rounds as appropriate      Aida Rico, [address] [place]    Pager: [phone]

## 2018-05-22 ENCOUNTER — APPOINTMENT (OUTPATIENT)
Dept: GENERAL RADIOLOGY | Age: 68
DRG: 246 | End: 2018-05-22
Attending: PHYSICIAN ASSISTANT
Payer: MEDICARE

## 2018-05-22 LAB
ANION GAP SERPL CALC-SCNC: 11 MMOL/L (ref 3–18)
ANION GAP SERPL CALC-SCNC: 12 MMOL/L (ref 3–18)
ANION GAP SERPL CALC-SCNC: 14 MMOL/L (ref 3–18)
ANION GAP SERPL CALC-SCNC: 15 MMOL/L (ref 3–18)
ARTERIAL PATENCY WRIST A: YES
ATRIAL RATE: 124 BPM
BASE DEFICIT BLD-SCNC: 1 MMOL/L
BASOPHILS # BLD: 0 K/UL (ref 0–0.1)
BASOPHILS NFR BLD: 0 % (ref 0–2)
BDY SITE: ABNORMAL
BUN SERPL-MCNC: 41 MG/DL (ref 7–18)
BUN SERPL-MCNC: 45 MG/DL (ref 7–18)
BUN SERPL-MCNC: 50 MG/DL (ref 7–18)
BUN SERPL-MCNC: 57 MG/DL (ref 7–18)
BUN/CREAT SERPL: 21 (ref 12–20)
BUN/CREAT SERPL: 22 (ref 12–20)
BUN/CREAT SERPL: 24 (ref 12–20)
BUN/CREAT SERPL: 25 (ref 12–20)
CALCIUM SERPL-MCNC: 8.1 MG/DL (ref 8.5–10.1)
CALCIUM SERPL-MCNC: 8.3 MG/DL (ref 8.5–10.1)
CALCIUM SERPL-MCNC: 8.4 MG/DL (ref 8.5–10.1)
CALCIUM SERPL-MCNC: 8.4 MG/DL (ref 8.5–10.1)
CALCULATED P AXIS, ECG09: 72 DEGREES
CALCULATED R AXIS, ECG10: 50 DEGREES
CALCULATED T AXIS, ECG11: 123 DEGREES
CHLORIDE SERPL-SCNC: 104 MMOL/L (ref 100–108)
CHLORIDE SERPL-SCNC: 105 MMOL/L (ref 100–108)
CO2 SERPL-SCNC: 20 MMOL/L (ref 21–32)
CO2 SERPL-SCNC: 21 MMOL/L (ref 21–32)
CO2 SERPL-SCNC: 23 MMOL/L (ref 21–32)
CO2 SERPL-SCNC: 23 MMOL/L (ref 21–32)
CREAT SERPL-MCNC: 1.94 MG/DL (ref 0.6–1.3)
CREAT SERPL-MCNC: 2.02 MG/DL (ref 0.6–1.3)
CREAT SERPL-MCNC: 2.06 MG/DL (ref 0.6–1.3)
CREAT SERPL-MCNC: 2.34 MG/DL (ref 0.6–1.3)
DIAGNOSIS, 93000: NORMAL
DIFFERENTIAL METHOD BLD: ABNORMAL
EOSINOPHIL # BLD: 0 K/UL (ref 0–0.4)
EOSINOPHIL NFR BLD: 0 % (ref 0–5)
ERYTHROCYTE [DISTWIDTH] IN BLOOD BY AUTOMATED COUNT: 16.8 % (ref 11.6–14.5)
EST. AVERAGE GLUCOSE BLD GHB EST-MCNC: 143 MG/DL
GAS FLOW.O2 O2 DELIVERY SYS: ABNORMAL L/MIN
GAS FLOW.O2 SETTING OXYMISER: 16 BPM
GLUCOSE BLD STRIP.AUTO-MCNC: 134 MG/DL (ref 70–110)
GLUCOSE BLD STRIP.AUTO-MCNC: 176 MG/DL (ref 70–110)
GLUCOSE BLD STRIP.AUTO-MCNC: 190 MG/DL (ref 70–110)
GLUCOSE BLD STRIP.AUTO-MCNC: 192 MG/DL (ref 70–110)
GLUCOSE SERPL-MCNC: 172 MG/DL (ref 74–99)
GLUCOSE SERPL-MCNC: 178 MG/DL (ref 74–99)
GLUCOSE SERPL-MCNC: 184 MG/DL (ref 74–99)
GLUCOSE SERPL-MCNC: 189 MG/DL (ref 74–99)
HBA1C MFR BLD: 6.6 % (ref 4.2–5.6)
HCO3 BLD-SCNC: 23.9 MMOL/L (ref 22–26)
HCT VFR BLD AUTO: 32.8 % (ref 35–45)
HGB BLD-MCNC: 10.5 G/DL (ref 12–16)
INSPIRATION.DURATION SETTING TIME VENT: 1 SEC
LYMPHOCYTES # BLD: 1.2 K/UL (ref 0.9–3.6)
LYMPHOCYTES NFR BLD: 9 % (ref 21–52)
MAGNESIUM SERPL-MCNC: 2.3 MG/DL (ref 1.6–2.6)
MAGNESIUM SERPL-MCNC: 2.4 MG/DL (ref 1.6–2.6)
MAGNESIUM SERPL-MCNC: 2.4 MG/DL (ref 1.6–2.6)
MCH RBC QN AUTO: 27.2 PG (ref 24–34)
MCHC RBC AUTO-ENTMCNC: 32 G/DL (ref 31–37)
MCV RBC AUTO: 85 FL (ref 74–97)
MONOCYTES # BLD: 0.5 K/UL (ref 0.05–1.2)
MONOCYTES NFR BLD: 4 % (ref 3–10)
NEUTS SEG # BLD: 11.4 K/UL (ref 1.8–8)
NEUTS SEG NFR BLD: 87 % (ref 40–73)
O2/TOTAL GAS SETTING VFR VENT: 0.4 %
P-R INTERVAL, ECG05: 166 MS
PCO2 BLD: 40.1 MMHG (ref 35–45)
PEEP RESPIRATORY: 5 CMH2O
PH BLD: 7.38 [PH] (ref 7.35–7.45)
PHOSPHATE SERPL-MCNC: 4 MG/DL (ref 2.5–4.9)
PHOSPHATE SERPL-MCNC: 4.5 MG/DL (ref 2.5–4.9)
PHOSPHATE SERPL-MCNC: 5.1 MG/DL (ref 2.5–4.9)
PLATELET # BLD AUTO: 322 K/UL (ref 135–420)
PMV BLD AUTO: 9.9 FL (ref 9.2–11.8)
PO2 BLD: 134 MMHG (ref 80–100)
POTASSIUM SERPL-SCNC: 4.4 MMOL/L (ref 3.5–5.5)
POTASSIUM SERPL-SCNC: 4.5 MMOL/L (ref 3.5–5.5)
Q-T INTERVAL, ECG07: 312 MS
QRS DURATION, ECG06: 76 MS
QTC CALCULATION (BEZET), ECG08: 448 MS
RBC # BLD AUTO: 3.86 M/UL (ref 4.2–5.3)
SAO2 % BLD: 99 % (ref 92–97)
SERVICE CMNT-IMP: ABNORMAL
SODIUM SERPL-SCNC: 139 MMOL/L (ref 136–145)
SPECIMEN TYPE: ABNORMAL
TOTAL RESP. RATE, ITRR: 16
VENTILATION MODE VENT: ABNORMAL
VENTRICULAR RATE, ECG03: 124 BPM
VOLUME CONTROL PLUS IVLCP: YES
VT SETTING VENT: 450 ML
WBC # BLD AUTO: 13.1 K/UL (ref 4.6–13.2)

## 2018-05-22 PROCEDURE — 65660000004 HC RM CVT STEPDOWN

## 2018-05-22 PROCEDURE — 77010033678 HC OXYGEN DAILY

## 2018-05-22 PROCEDURE — 74011250637 HC RX REV CODE- 250/637: Performed by: INTERNAL MEDICINE

## 2018-05-22 PROCEDURE — 77030020454 HC TU ET CUF HI/LO COVD -B

## 2018-05-22 PROCEDURE — 71045 X-RAY EXAM CHEST 1 VIEW: CPT

## 2018-05-22 PROCEDURE — 74011000250 HC RX REV CODE- 250: Performed by: PHYSICIAN ASSISTANT

## 2018-05-22 PROCEDURE — 82803 BLOOD GASES ANY COMBINATION: CPT

## 2018-05-22 PROCEDURE — 74011250636 HC RX REV CODE- 250/636: Performed by: PHYSICIAN ASSISTANT

## 2018-05-22 PROCEDURE — 74011250637 HC RX REV CODE- 250/637: Performed by: PHYSICIAN ASSISTANT

## 2018-05-22 PROCEDURE — 74011636637 HC RX REV CODE- 636/637: Performed by: INTERNAL MEDICINE

## 2018-05-22 PROCEDURE — 80048 BASIC METABOLIC PNL TOTAL CA: CPT | Performed by: INTERNAL MEDICINE

## 2018-05-22 PROCEDURE — 83735 ASSAY OF MAGNESIUM: CPT | Performed by: PHYSICIAN ASSISTANT

## 2018-05-22 PROCEDURE — 74011636637 HC RX REV CODE- 636/637: Performed by: PHYSICIAN ASSISTANT

## 2018-05-22 PROCEDURE — 83036 HEMOGLOBIN GLYCOSYLATED A1C: CPT | Performed by: INTERNAL MEDICINE

## 2018-05-22 PROCEDURE — 80048 BASIC METABOLIC PNL TOTAL CA: CPT | Performed by: PHYSICIAN ASSISTANT

## 2018-05-22 PROCEDURE — 94003 VENT MGMT INPAT SUBQ DAY: CPT

## 2018-05-22 PROCEDURE — 84100 ASSAY OF PHOSPHORUS: CPT | Performed by: PHYSICIAN ASSISTANT

## 2018-05-22 PROCEDURE — 94640 AIRWAY INHALATION TREATMENT: CPT

## 2018-05-22 PROCEDURE — 77030027138 HC INCENT SPIROMETER -A

## 2018-05-22 PROCEDURE — 74011250636 HC RX REV CODE- 250/636: Performed by: INTERNAL MEDICINE

## 2018-05-22 PROCEDURE — 93005 ELECTROCARDIOGRAM TRACING: CPT

## 2018-05-22 PROCEDURE — 85025 COMPLETE CBC W/AUTO DIFF WBC: CPT | Performed by: INTERNAL MEDICINE

## 2018-05-22 PROCEDURE — 36600 WITHDRAWAL OF ARTERIAL BLOOD: CPT

## 2018-05-22 PROCEDURE — 82962 GLUCOSE BLOOD TEST: CPT

## 2018-05-22 PROCEDURE — 74011000250 HC RX REV CODE- 250: Performed by: INTERNAL MEDICINE

## 2018-05-22 PROCEDURE — 36415 COLL VENOUS BLD VENIPUNCTURE: CPT | Performed by: INTERNAL MEDICINE

## 2018-05-22 PROCEDURE — 77030013140 HC MSK NEB VYRM -A

## 2018-05-22 RX ORDER — METOPROLOL TARTRATE 5 MG/5ML
2.5 INJECTION INTRAVENOUS ONCE
Status: COMPLETED | OUTPATIENT
Start: 2018-05-22 | End: 2018-05-22

## 2018-05-22 RX ORDER — INSULIN LISPRO 100 [IU]/ML
INJECTION, SOLUTION INTRAVENOUS; SUBCUTANEOUS
Status: DISCONTINUED | OUTPATIENT
Start: 2018-05-22 | End: 2018-05-23 | Stop reason: HOSPADM

## 2018-05-22 RX ORDER — INSULIN GLARGINE 100 [IU]/ML
5 INJECTION, SOLUTION SUBCUTANEOUS ONCE
Status: COMPLETED | OUTPATIENT
Start: 2018-05-22 | End: 2018-05-22

## 2018-05-22 RX ORDER — CARVEDILOL 6.25 MG/1
6.25 TABLET ORAL 2 TIMES DAILY WITH MEALS
Status: DISCONTINUED | OUTPATIENT
Start: 2018-05-22 | End: 2018-05-23 | Stop reason: HOSPADM

## 2018-05-22 RX ORDER — INSULIN GLARGINE 100 [IU]/ML
15 INJECTION, SOLUTION SUBCUTANEOUS DAILY
Status: DISCONTINUED | OUTPATIENT
Start: 2018-05-23 | End: 2018-05-23 | Stop reason: HOSPADM

## 2018-05-22 RX ORDER — METOPROLOL TARTRATE 5 MG/5ML
1.25 INJECTION INTRAVENOUS ONCE
Status: DISCONTINUED | OUTPATIENT
Start: 2018-05-22 | End: 2018-05-22

## 2018-05-22 RX ORDER — CARVEDILOL 3.12 MG/1
3.12 TABLET ORAL 2 TIMES DAILY WITH MEALS
Status: DISCONTINUED | OUTPATIENT
Start: 2018-05-22 | End: 2018-05-22

## 2018-05-22 RX ADMIN — Medication 5 ML: at 14:00

## 2018-05-22 RX ADMIN — ASPIRIN 81 MG 81 MG: 81 TABLET ORAL at 09:22

## 2018-05-22 RX ADMIN — METHYLPREDNISOLONE SODIUM SUCCINATE 40 MG: 125 INJECTION, POWDER, FOR SOLUTION INTRAMUSCULAR; INTRAVENOUS at 05:53

## 2018-05-22 RX ADMIN — FENTANYL CITRATE 50 MCG: 50 INJECTION INTRAMUSCULAR; INTRAVENOUS at 07:05

## 2018-05-22 RX ADMIN — PROPOFOL 50 MCG/KG/MIN: 10 INJECTION, EMULSION INTRAVENOUS at 00:01

## 2018-05-22 RX ADMIN — OXYCODONE HYDROCHLORIDE AND ACETAMINOPHEN 1 TABLET: 5; 325 TABLET ORAL at 20:33

## 2018-05-22 RX ADMIN — ATORVASTATIN CALCIUM 80 MG: 40 TABLET, FILM COATED ORAL at 22:39

## 2018-05-22 RX ADMIN — TICAGRELOR 90 MG: 90 TABLET ORAL at 17:59

## 2018-05-22 RX ADMIN — IPRATROPIUM BROMIDE AND ALBUTEROL SULFATE 3 ML: .5; 3 SOLUTION RESPIRATORY (INHALATION) at 20:48

## 2018-05-22 RX ADMIN — CHLORHEXIDINE GLUCONATE 10 ML: 1.2 RINSE ORAL at 09:22

## 2018-05-22 RX ADMIN — METOPROLOL TARTRATE 2.5 MG: 5 INJECTION INTRAVENOUS at 14:09

## 2018-05-22 RX ADMIN — INSULIN LISPRO 2 UNITS: 100 INJECTION, SOLUTION INTRAVENOUS; SUBCUTANEOUS at 05:53

## 2018-05-22 RX ADMIN — METOPROLOL TARTRATE 6.25 MG: 25 TABLET ORAL at 09:22

## 2018-05-22 RX ADMIN — INSULIN GLARGINE 5 UNITS: 100 INJECTION, SOLUTION SUBCUTANEOUS at 14:07

## 2018-05-22 RX ADMIN — IPRATROPIUM BROMIDE AND ALBUTEROL SULFATE 3 ML: .5; 3 SOLUTION RESPIRATORY (INHALATION) at 12:31

## 2018-05-22 RX ADMIN — TICAGRELOR 90 MG: 90 TABLET ORAL at 09:22

## 2018-05-22 RX ADMIN — CARVEDILOL 6.25 MG: 6.25 TABLET, FILM COATED ORAL at 17:59

## 2018-05-22 RX ADMIN — FERROUS SULFATE TAB 325 MG (65 MG ELEMENTAL FE) 325 MG: 325 (65 FE) TAB at 17:59

## 2018-05-22 RX ADMIN — PROPOFOL 50 MCG/KG/MIN: 10 INJECTION, EMULSION INTRAVENOUS at 03:46

## 2018-05-22 RX ADMIN — METOPROLOL TARTRATE 2.5 MG: 5 INJECTION, SOLUTION INTRAVENOUS at 14:45

## 2018-05-22 RX ADMIN — METHYLPREDNISOLONE SODIUM SUCCINATE 40 MG: 125 INJECTION, POWDER, FOR SOLUTION INTRAMUSCULAR; INTRAVENOUS at 22:39

## 2018-05-22 RX ADMIN — Medication 10 ML: at 05:54

## 2018-05-22 RX ADMIN — IPRATROPIUM BROMIDE AND ALBUTEROL SULFATE 3 ML: .5; 3 SOLUTION RESPIRATORY (INHALATION) at 16:11

## 2018-05-22 RX ADMIN — Medication 10 ML: at 22:39

## 2018-05-22 RX ADMIN — IPRATROPIUM BROMIDE AND ALBUTEROL SULFATE 3 ML: .5; 3 SOLUTION RESPIRATORY (INHALATION) at 03:54

## 2018-05-22 RX ADMIN — SODIUM CHLORIDE 20 MG: 9 INJECTION INTRAMUSCULAR; INTRAVENOUS; SUBCUTANEOUS at 09:22

## 2018-05-22 RX ADMIN — INSULIN GLARGINE 10 UNITS: 100 INJECTION, SOLUTION SUBCUTANEOUS at 09:23

## 2018-05-22 RX ADMIN — OXYCODONE HYDROCHLORIDE AND ACETAMINOPHEN 1 TABLET: 5; 325 TABLET ORAL at 14:45

## 2018-05-22 RX ADMIN — IPRATROPIUM BROMIDE AND ALBUTEROL SULFATE 3 ML: .5; 3 SOLUTION RESPIRATORY (INHALATION) at 01:12

## 2018-05-22 RX ADMIN — FERROUS SULFATE TAB 325 MG (65 MG ELEMENTAL FE) 325 MG: 325 (65 FE) TAB at 09:00

## 2018-05-22 RX ADMIN — IPRATROPIUM BROMIDE AND ALBUTEROL SULFATE 3 ML: .5; 3 SOLUTION RESPIRATORY (INHALATION) at 08:22

## 2018-05-22 RX ADMIN — INSULIN LISPRO 2 UNITS: 100 INJECTION, SOLUTION INTRAVENOUS; SUBCUTANEOUS at 11:45

## 2018-05-22 RX ADMIN — INSULIN LISPRO 2 UNITS: 100 INJECTION, SOLUTION INTRAVENOUS; SUBCUTANEOUS at 16:30

## 2018-05-22 NOTE — PROGRESS NOTES
0715 Respiratory called to initiate SBT  0718 Bedside and Verbal shift change report given to TABATHA Chirinos (oncoming nurse) by Luan Liu RN (offgoing nurse). Report included the following information SBAR, Kardex and Recent Results. 0720 Respiratory at bedside to start SBT  0727 Head to toe assessment complete. Repositioned. Follows commands and able to answer yes/no questions by the shaking/nodding head. 1519 Explained to pt the events over the last 24-36 hrs. Explained why pt is currently restrained and the process of breathing trial and getting breathing tube out. Call bell in reach. Informed pt to call for anything. Pt able to nod head to show she understands. 46 Found pt in process of self extubating. ETT cuff still inflated. Mouth suctioned and 02 applied @ 4L. Audible voice noted.  Will continue to monitor  0820 Notified Dr. Austen Da Silva of recent events

## 2018-05-22 NOTE — PROGRESS NOTES
SUBJECTIVE:    Patient is walking from bathroom to bed. States retrosternal chest pain coming back. PIERSON present. No cough. No abdominal pain but cramps/spasm present. No nausea or vomiting. She ate earlier without any issues. No dizziness. OBJECTIVE:    /54  Pulse (!) 116  Temp 98.5 °F (36.9 °C)  Resp 25  Ht 5' 4\" (1.626 m)  Wt 98.9 kg (218 lb 0.6 oz)  SpO2 95%  BMI 37.43 kg/m2    HEENT:  No pallor. Oral mucosa is moist  Neck: no JVD  CVS: RRR, Tachy  RS: diminished BS in bases, no wheezes. GI: NT, BS +  Extremities: no pedal edema  General: NAD, Awake  CNS: Moves all extremities well     ASSESSMENT:    1. Anterolateral STEMI s/p YADIRA to proximal LAD with RESOLUTE stent. 2. Anteroseptal STEMI s/p stenting to ostial and mid LAD lesions with RESOLUTE stent. 3. Acute hypoxic respiratory failure sec to acute pulmonary edema and HTN'madhav cardiomyopathy s/p extubation   4. History of HFmrEF with EF 35%. 5. DM Type II  6. Dyslipidemia. 7. HTN  8. COPD  9. History of medical non-compliance  10. Steroid induced leukocytosis  11.  Acute on stage 3 CKD    PLAN:    Cont current management  EKG x 1 now  D/w cardiology PA    CMP:   Lab Results   Component Value Date/Time     05/22/2018 10:15 AM    K 4.4 05/22/2018 10:15 AM     05/22/2018 10:15 AM    CO2 20 (L) 05/22/2018 10:15 AM    AGAP 15 05/22/2018 10:15 AM     (H) 05/22/2018 10:15 AM    BUN 50 (H) 05/22/2018 10:15 AM    CREA 2.02 (H) 05/22/2018 10:15 AM    GFRAA 30 (L) 05/22/2018 10:15 AM    GFRNA 25 (L) 05/22/2018 10:15 AM    CA 8.4 (L) 05/22/2018 10:15 AM    MG 2.4 05/22/2018 10:15 AM    PHOS 4.5 05/22/2018 10:15 AM     CBC:   Lab Results   Component Value Date/Time    WBC 13.1 05/22/2018 06:00 AM    HGB 10.5 (L) 05/22/2018 06:00 AM    HCT 32.8 (L) 05/22/2018 06:00 AM     05/22/2018 06:00 AM

## 2018-05-22 NOTE — PROGRESS NOTES
NUTRITION    Nutrition Screen      RECOMMENDATIONS / PLAN:     - Change to soft solid diet with chopped meats.  - Monitor po intake and diet tolerance. - Continue RD inpatient monitoring and evaluation. NUTRITION INTERVENTIONS & DIAGNOSIS:     [x] Meals/snacks: modify composition     Nutrition Diagnosis: Inadequate oral intake related to diet intolerance, difficulty chewing as evidenced by pt with poor dentition, missing teeth and unable to consume foods on current regular consistency diet per pt report. ASSESSMENT:     5/22: Tolerated SBT and self-extubated this morning, OGT removed and started on solid regular consistency diet. Reports fair appetite and difficulty eating due to poor dentition, does not own dentures or partials. Agreeable to softer foods. Reports good appetite/po intake PTA and denies change in weight. Denies nausea/vomiting, c/o constipation that is a chronic issue at home, BM today. Discussed heart healthy and diabetic diet to follow at home. 5/21: Pt intubated for acute respiratory distress. OGT clamped. Average po intake adequate to meet patients estimated nutritional needs:   [] Yes     [] No   [x] Unable to determine at this time    Diet: DIET DIABETIC CONSISTENT CARB Regular; AHA-LOW-CHOL FAT      Food Allergies: NKFA  Current Appetite:   [] Good     [x] Fair     [] Poor     [] Other:   Appetite/meal intake prior to admission:   [x] Good     [] Fair     [] Poor     [] Other:   Feeding Limitations:  [] Swallowing difficulty    [x] Chewing difficulty: poor dentition, missing teeth    [] Other:   Current Meal Intake: No data found.     BM: 5/22   Skin Integrity: WDL  Edema: non-pitting LEs  Pertinent Medications: Reviewed: colace, ferrous sulfate, lasix, lantus, SSI, steroid    Recent Labs      05/22/18   1015  05/22/18   0600  05/22/18   0100  05/21/18   1635   05/21/18   0623   05/20/18   1916   NA  139  139  139  138   < >  138   < >  139   K  4.4  4.4  4.5  4.9   < >  5.2   < >  4.3   CL  104  104  105  105   < >  105   < >  109*   CO2  20*  21  23  21   < >  23   < >  22   GLU  172*  178*  184*  150*   < >  165*   < >  128*   BUN  50*  45*  41*  38*   < >  32*   < >  28*   CREA  2.02*  2.06*  1.94*  1.84*   < >  1.95*   < >  1.61*   CA  8.4*  8.1*  8.3*  8.6   < >  8.7   < >  9.1   MG  2.4   --   2.3  2.3   < >  2.7*   < >   --    PHOS  4.5   --   5.1*  4.6   < >  4.7   < >   --    ALB   --    --    --    --    --   3.4   --   3.6   SGOT   --    --    --    --    --   560*   --   22   ALT   --    --    --    --    --   76*   --   31    < > = values in this interval not displayed. Intake/Output Summary (Last 24 hours) at 05/22/18 1258  Last data filed at 05/22/18 1030   Gross per 24 hour   Intake           699.61 ml   Output             2046 ml   Net         -1346.39 ml       Anthropometrics:  Ht Readings from Last 1 Encounters:   05/20/18 5' 4\" (1.626 m)     Last 3 Recorded Weights in this Encounter    05/20/18 1917 05/20/18 2214   Weight: 93.4 kg (206 lb) 98.9 kg (218 lb 0.6 oz)     Body mass index is 37.43 kg/(m^2).       Obese, Class II     Weight History: patient reports usual weight of 206 lb PTA (measured 5/19/18) and denies change in weight PTA    Weight Metrics 5/20/2018 4/26/2017 1/4/2017 9/27/2016 8/30/2016 8/3/2016 6/8/2016   Weight 218 lb 0.6 oz 191 lb 12.8 oz 198 lb 9.6 oz 195 lb 197 lb 197 lb 215 lb   BMI 37.43 kg/m2 32.92 kg/m2 34.09 kg/m2 33.47 kg/m2 33.81 kg/m2 33.8 kg/m2 36.89 kg/m2        Admitting Diagnosis: STEMI (ST elevation myocardial infarction) ([facility] [address] CARA  Pertinent PMHx: COPD, HTN, DM, dyslipidemia, cardiomyopathy      Education Needs:        [x] None identified  [] Identified - Not appropriate at this time  []  Identified and addressed - refer to education log  Learning Limitations:   [x] None identified  [] Identified:   Cultural, Adventist & ethnic food preferences:  [x] None identified    [] Identified and addressed     ESTIMATED NUTRITION NEEDS: Calories: 0027-1640 kcal (25-30 kcal/kg kcal/kg) based on  [] Actual BW     [x] SBW 66 kg  Protein: 53-79 gm (0.8-1.2 gm/kg) based on  [] Actual BW      [x] SBW   Fluid: 1 mL/kcal     MONITORING & EVALUATION:     Nutrition Goal(s): goal modified   1. Po intake of meals will meet >75% of patient estimated nutritional needs within the next 7 days.   Outcome:  [] Met/Ongoing    []  Not Met    [x] New/Initial Goal      Monitoring:   [x] Food and beverage intake   [x] Diet order   [x] Nutrition-focused physical findings   [x] Treatment/therapy   [] Weight   [] Enteral nutrition intake        Previous Recommendations (for follow-up assessments only):     []   Implemented       []   Not Implemented (RD to address)      [x] No Longer Appropriate     [] No Recommendation Made     Discharge Planning: cardiac, diabetic, soft solid diet   [x] Participated in care planning, discharge planning, & interdisciplinary rounds as appropriate      Leatha Jesus, [address], [address] [place] [address]    Pager: [phone]

## 2018-05-22 NOTE — PROGRESS NOTES
Bedside and Verbal shift change report given to Claudette Delgado RN (oncoming nurse) by Macarena Tovar RN (offgoing nurse). Report included the following information SBAR, Kardex, Intake/Output and MAR.

## 2018-05-22 NOTE — PROGRESS NOTES
05/22/18 0714   Weaning Parameters   Spontaneous Breathing Trial Complete (Start of SBT)   Resp Rate Observed 13   Ve 7      RSBI 26

## 2018-05-22 NOTE — DIABETES MGMT
Glycemic Control Plan of Care    T2DM with current A1c of 6.6% (05/21/2018). See separate notes for assessment of home diabetes management and eduction, 05/22/2018. Home diabetes med: Levemir insulin 15 units daily every morning. POC BG range on 05/21/2018: 149-182 mg/dL. POC BG report on 05/22/2018 at time of review: 192, 190 mg/dL. Patient stated that she is on levemir insulin 15 units daily at home. IV Solumedrol 40 mg every twelve hours. Recommendation(s):  1.) Discussed recommendation with Dr. Isac Iyer to increase basal lantus insulin to 15 units and obtained verbal order. Patient already received 10 units of lantus insulin this AM. Entered a one time additional dose of 5 units lantus insulin for 05/22/2018. Assessment:  Patient is 74year old with past medical history including type 2 diabetes mellitus, COPD, hypertension,  STEMI, dyslipidemia, decreased GFR, dilated cardiomyopathy, and obesity - was admitted on 05/20/2018 with report of chest and left shoulder pain, feeling of heaviness, and shortness of breath. Noted:  STEMI status post YADIRA to proximal LAD with resolute stent. Acute respiratory failure. COPD. T2DM with current A1c of 6.6% (05/21/2018). Most recent blood glucose values:    Results for Brandin Smith (MRN [id number]) as of 5/22/2018 13:19   Ref. Range 5/21/2018 05:58 5/21/2018 11:35 5/21/2018 17:41 5/21/2018 23:49   GLUCOSE,FAST - POC Latest Ref Range: 70 - 110 mg/dL 175 (H) 179 (H) 149 (H) 182 (H)     Results for Brandin Smith (MRN [id number]) as of 5/22/2018 13:19   Ref. Range 5/22/2018 05:52 5/22/2018 11:29   GLUCOSE,FAST - POC Latest Ref Range: 70 - 110 mg/dL 192 (H) 190 (H)     Current A1C: 6.6% (05/21/2018) is equivalent to average blood glucose of 143 mg/dL during the past 2-3 months. Current hospital diabetes medications:  Increased basal lantus insulin to 15 units daily starting 05/22/2018  Correctional lispro insulin ACHS. Normal sensitivity dose.     Total daily dose insulin requirement previous day: 05/21/2018  Lantus: 10 units  Lispro: 6 units  TDD: 16 units of insulin    Home diabetes medications: Patient reported on 05/22/2018:  Levemir insulin 15 units daily every morning. Diet: Diabetic consistent carb soft solids; chopped meats.     Goals:  Blood glucose will be within target range of  mg/dL by 05/25/2018    Education:  _X__  Refer to Diabetes Education Record: 0522/2018             ___  Education not indicated at this time    Marycruz Olmstead RN [facility]  Pager: [phone]

## 2018-05-22 NOTE — PROGRESS NOTES
[facility]  -  Progress Note      Patient: Melida Osborne MRN: [id number]  SSN: [id number]    YOB: 1950  Age: 74 y.o. Sex: female      Admit Date: 5/20/2018    Assessment:     -Acute Anterolateral STEMI- s/p YADIRA to proximal LAD with RESOLUTE stent 5/20 pm.  -Acute stent thrombosis shortly after initial PCI requiring emergency intubation for pulmonary edema and repeat cardiac cath and additional PCI/YADIRA to prox LAD  -Acute pulmonary edema/respiratory failure secondary to above. Now resolved, patient self extubated this am.  CXR better  -Ischemic cardiomyopathy. EF 35% on echo post MI 5/21. Now better compensated, initially acute systolic heart failure due to cardiogenic shock in setting of AMI. -CARA. Likely due to acute MI/cardiogenic shock, now with good UO  -DM Type II  -Dyslipidemia- LDL> 200, supposed on atorva 80 mg daily, not sure is she was taking her medications  -HTN. On ACEI and CCB as outpatient. -COPD  -Tobacco abuse. 1 ppd X 50 years prior to admission    Plan:     -- will hold off on additional diuretics today  -- changed metoprolol to Coreg with depressed LVEF  -- ASA, Brilinta, potent statin  -- holding ACEI with CARA  -- OOB as tolerated, should be stable to transfer to CVT stepdown later today if bed available.   -- repeat CMP tomorrow  -- smoking cessation discusseed      Subjective:     No further chest pain, c/o sore throat for ETT    Objective:      Patient Vitals for the past 8 hrs:   Temp Pulse Resp BP SpO2   05/22/18 0929 - (!) 112 24 120/67 98 %   05/22/18 0859 - (!) 114 20 127/62 99 %   05/22/18 0807 - 98 19 116/68 100 %   05/22/18 0759 - 98 17 125/66 100 %   05/22/18 0753 - (!) 112 26 141/65 98 %   05/22/18 0728 98.3 °F (36.8 °C) 94 14 127/71 99 %   05/22/18 0714 - 95 14 - 100 %   05/22/18 0659 - 100 28 127/62 100 %   05/22/18 0600 - 92 17 - 100 %   05/22/18 0559 - 92 18 109/55 100 %   05/22/18 0529 - 94 17 107/45 100 %   05/22/18 0500 - 94 18 - 99 %   05/22/18 0459 - 96 18 97/49 99 %   05/22/18 0429 - 96 17 101/50 99 %   05/22/18 0402 98.4 °F (36.9 °C) 94 16 94/50 99 %   05/22/18 0400 - 94 16 - 99 %   05/22/18 0359 - 92 16 90/49 99 %   05/22/18 0354 - 94 16 - 100 %   05/22/18 0329 - 94 20 103/49 99 %   05/22/18 0300 - 94 18 - 99 %   05/22/18 0259 - 94 17 105/53 99 %         Patient Vitals for the past 96 hrs:   Weight   05/20/18 2214 98.9 kg (218 lb 0.6 oz)   05/20/18 1917 93.4 kg (206 lb)         Intake/Output Summary (Last 24 hours) at 05/22/18 1053  Last data filed at 05/22/18 0800   Gross per 24 hour   Intake           699.61 ml   Output             2570 ml   Net         -1870.39 ml       Physical Exam:  General:  alert, cooperative, no distress, appears stated age  Neck:  no carotid bruit, no JVD  Lungs:  clear to auscultation bilaterally  Heart:  Tachycardiac, regular rate, no S3, no murmur  Abdomen:  abdomen is soft without significant tenderness, masses, organomegaly or guarding  Extremities:  extremities normal, atraumatic, no cyanosis or edema, both groin sites soft, no ecchymosis or hematoma  Neuro:  No focal deficits    Data Review:     Labs: Results:       Chemistry Recent Labs      05/22/18   0600  05/22/18   0100  05/21/18   1635   05/21/18   1030  05/21/18   0623   05/20/18   1916   GLU  178*  184*  150*   --   182*  165*   < >  128*   NA  139  139  138   --   137  138   < >  139   K  4.4  4.5  4.9   < >  6.0*  5.2   < >  4.3   CL  104  105  105   --   106  105   < >  109*   CO2  21  23  21   --   20*  23   < >  22   BUN  45*  41*  38*   --   35*  32*   < >  28*   CREA  2.06*  1.94*  1.84*   --   1.73*  1.95*   < >  1.61*   CA  8.1*  8.3*  8.6   --   8.3*  8.7   < >  9.1   MG   --   2.3  2.3   --   2.4  2.7*   < >   --    PHOS   --   5.1*  4.6   --   4.0  4.7   < >   --    AGAP  14  11  12   --   11  10   < >  8   BUCR  22*  21*  21*   --   20  16   < >  17   AP   --    --    --    --    --   114   --   112   TP   --    --    --    --    -- 7.3   --   7.3   ALB   --    --    --    --    --   3.4   --   3.6   GLOB   --    --    --    --    --   3.9   --   3.7   AGRAT   --    --    --    --    --   0.9   --   1.0    < > = values in this interval not displayed. CBC w/Diff Recent Labs      05/22/18   0600  05/21/18   1635  05/21/18   0623   05/20/18   1916   WBC  13.1  13.6*  11.7   < >  4.8   RBC  3.86*  4.23  4.27   < >  4.23   HGB  10.5*  11.8*  11.8*   < >  11.6*   HCT  32.8*  36.9  37.5   < >  36.2   PLT  322  323  331   < >  301   GRANS  87*   --    --    --   59   LYMPH  9*   --    --    --   28   EOS  0   --    --    --   5    < > = values in this interval not displayed. Cardiac Enzymes No results found for: CPK, CK, CKMMB, CKMB, RCK3, CKMBT, CKNDX, CKND1, GODFREY, TROPT, TROIQ, SANG, TROPT, TNIPOC, BNP, BNPP   Coagulation No results for input(s): PTP, INR, APTT in the last 72 hours.     No lab exists for component: INREXT    Lipid Panel Lab Results   Component Value Date/Time    Cholesterol, total 291 (H) 05/21/2018 06:23 AM    HDL Cholesterol 52 05/21/2018 06:23 AM    LDL, calculated 212.6 (H) 05/21/2018 06:23 AM    VLDL, calculated 26.4 05/21/2018 06:23 AM    Triglyceride 132 05/21/2018 06:23 AM    CHOL/HDL Ratio 5.6 (H) 05/21/2018 06:23 AM      BNP No results found for: BNP, BNPP, XBNPT   Liver Enzymes Recent Labs      05/21/18 0623   TP  7.3   ALB  3.4   AP  114   SGOT  560*      Digoxin    Thyroid Studies Lab Results   Component Value Date/Time    TSH 1.01 05/21/2018 06:23 AM

## 2018-05-22 NOTE — PROGRESS NOTES
[employer] [facility]  Pulmonary, Critical Care, and Sleep Medicine      Name: Pablito Duong MRN: [id number]   : 1950 Hospital: [facility]   Date: 2018          Pulm/Critical Care follow up        IMPRESSION:     · Acute hypoxic respiratory failure, intubated for unresponsiveness, CXR with worsening bilateral infiltrates, high suspicion for acute pulmonary edema preceded by hypertensive emergency and underlying CAD, s/p revascularization with improvement. Tolerated SBT this am but self extubated awaiting respiratory therapy evaluation. Tolerated well. · STEMI, initially to anteroseptal EKG leads, s/p PCI and stenting of proximal LAD with 90-95% stenosis followed by EKG changes to anterolateral leads with emergent repeat PCI and stenting to ostial and mid LAD  · Acute on chronic kidney disease-improved  · Acute metabolic acidosis- resolved  · Hx COPD with mild exacerbation  · Hx DM, last hgb A1c () 6.6  · Hx HTN; dilated cardiomyopathy with prior EF 45-50% () with Gr 1 diastolic dysfunction; mild to moderate mitral valve regurgitation  · Hx cervical spondylosis, chronic pain syndrome      RECOMMENDATIONS:   · Resp - titrate FiO2 to keep SpO2 > 90%. Scheduled bronchodilator; wean IV steroid. Add incentive spirometry, airway clearance  · ID - no suspicion for infectious process. · CVS -hemodynamically stable. Defer post STEMI/CAD management to Cardiology   · Heme/Onc- stable hgb/hct, plt; monitor for gross active bleeding. · Metabolic - Serial trend of chemistries (BMP, Mg, Phos) with appropriate replacement   · Renal - soto cath; strict I/O  · Endocrine - glycemic control with sliding scale insulin. Diabetic diet  · Neuro/ Pain/ Sedation - d/c all seadtion  · GI - start diet  · Prophylaxis - DVT, GI     Subjective/History:      18   Overnight did well. Hemodynamically stable. Improved oxygenation.   Initiated SBT and sedation holiday- tolerated well and towards end extubated self. Post extubation no resp distress. Awake, alert, coughing adequately. C/o minimal sore throat. Denies chest pain  Denies nausea, vomiting  All sedation off. Lines- groin sheath removed. Still has Soto. HPI: This admission  This patient has been seen and evaluated at the request of Dr. Sandra Noonan for ventilator management. Patient is a 74 y.o. female with hx of COPD, Hx DM, HTN, dilated cardiomyopathy with prior EF 45-50% (2015) with Gr 1 diastolic dysfunction; mild to moderate mitral valve regurgitation; cervical spondylosis, chronic pain syndrome presented to the ED with chest pain with associated right arm pain per chart review; found to have STEMI for which she underwent emergent cath with findings of single vessel CAD to LAD s/p stenting of proximal LAD. Pt was transferred to CVT ICU and shortly thereafter started to have wheezing and increasing tachypnea and noted to have elevated BP. Pt was given lasix 80 mg IV total, morphine 1 mg IV, labetalol 20 mg IV and breathing treatment per hospitalist. Pt noted to have gone into bradycardia with HR around 48 and then pt became unresponsive. Code blue was called however pt had a pulse the entire time per RN bedside. Pt was intubated by anesthesia for unresponsiveness. Call then was made at around [address] to [facility] for vent management. Serial EKG done for rhythm changes on telemetry monitoring prompting a repeat STEMI alert. Pt underwent repeat cardiac catheterization with stenting to the ostial and mid-LAD. Pt was transferred to ICU on ventilator and sedated on propofol infusion.  Since insertion of soto catheter prior to 2nd cath lab, pt had produced over a liter of dilute urine      Past Medical History:   Diagnosis Date    Abnormal WBC count 5/17/2016    Bilateral shoulder pain 9/27/2016    Chondromalacia of both patellae     Chronic lung disease     Chronic obstructive pulmonary disease ([facility] [address] 5/17/2016    COPD (chronic obstructive pulmonary disease) ([facility] [address] 9/2015    mild-mod dz; Dr Martin Mckeon    Diabetes [facility]) 2013    Diabetes mellitus ([facility]     Diabetic eye exam ([facility] 2016    Dilated cardiomyopathy ([facility]     Dyslipidemia     History of echocardiogram 11/14/2014    Mild LVE. EF 40%. Mild, diffuse hypk. Mild LAE. Mild MR.      Hypercholesteremia 1/08/14    Hypertension 2000    Noncompliance with medications 2/16/2016    Obesity     Orthostatic hypotension 5/17/2016    Osteoarthritis of both knees     Pain management 04/01/2016    Dr. Drake Area     Popliteal cyst, bilateral      Vitamin D deficiency 10/16/14      History reviewed. No pertinent surgical history. Prior to Admission medications    Medication Sig Start Date End Date Taking? Authorizing Provider   insulin detemir (LEVEMIR) 100 unit/mL injection 15 units daily for diabetes 7/20/17   Patrick Elder NP   albuterol (PROVENTIL HFA, VENTOLIN HFA, PROAIR HFA) 90 mcg/actuation inhaler Take 2 Puffs by inhalation every four (4) hours as needed for Wheezing. 7/20/17   Patrick Elder NP   omega-3 acid ethyl esters (LOVAZA) 1 gram capsule Take 2 Caps by mouth daily (with breakfast). 7/20/17   Patrick Elder NP   amLODIPine-atorvastatin (CADUET) 10-80 mg per tablet Take 1 Tab by mouth daily. 7/20/17   Patrick Elder NP   lisinopril (PRINIVIL, ZESTRIL) 20 mg tablet Take 1 Tab by mouth daily. For blood pressure 4/26/17   Hercules Olszewski Northern, NP   magnesium oxide (MAG-OX) 400 mg tablet Take 1 Tab by mouth daily. For low magnesium 4/26/17   Hercules Olszewski [place], NP   desloratadine (CLARINEX) 5 mg tablet Take 1 Tab by mouth daily as needed for Allergies. 4/26/17   Patrick Elder NP   mometasone (NASONEX) 50 mcg/actuation nasal spray 2 Sprays by Both Nostrils route daily as needed. For allergies 4/26/17   Hercules Olszewski [place], NP   umeclidinium-vilanterol [facility] ELLIPTA) 62.5-25 mcg/actuation inhaler Take 1 Puff by inhalation daily.  For COPD 4/26/17   Hercules Olszewski Northern, NP   traMADol (ULTRAM) 50 mg tablet Take 1 Tab by mouth every six (6) hours as needed. Max Daily Amount: 200 mg. For arthritic pains 4/26/17   Les Levels, NP   esomeprazole (NEXIUM) 40 mg capsule Take 1 Cap by mouth daily as needed. For reflux 4/26/17   Les Levels, NP   albuterol (PROVENTIL VENTOLIN) 2.5 mg /3 mL (0.083 %) nebulizer solution 3 mL by Nebulization route every four (4) hours as needed for Wheezing. 9/27/16   Les Levels, NP   ferrous sulfate 325 mg (65 mg iron) tablet Take 1 Tab by mouth two (2) times a day. Indications: IRON DEFICIENCY ANEMIA 10/22/15   Les Levels, NP     Current Facility-Administered Medications   Medication Dose Route Frequency    insulin lispro (HUMALOG) injection   SubCUTAneous Q6H    chlorhexidine (PERIDEX) 0.12 % mouthwash 10 mL  10 mL Oral Q12H    albuterol-ipratropium (DUO-NEB) 2.5 MG-0.5 MG/3 ML  3 mL Nebulization Q4H RT    famotidine (PF) (PEPCID) 20 mg in sodium chloride 0.9% 10 mL injection  20 mg IntraVENous DAILY    metoprolol tartrate (LOPRESSOR) tablet 6.25 mg  6.25 mg Oral Q12H    ferrous sulfate tablet 325 mg  325 mg Oral BID    insulin glargine (LANTUS) injection 10 Units  10 Units SubCUTAneous DAILY    nicotine (NICODERM CQ) 14 mg/24 hr patch 1 Patch  1 Patch TransDERmal Q24H    aspirin chewable tablet 81 mg  81 mg Oral DAILY    methylPREDNISolone (PF) (SOLU-MEDROL) injection 40 mg  40 mg IntraVENous Q8H    sodium chloride (NS) flush 5-10 mL  5-10 mL IntraVENous Q8H    ticagrelor (BRILINTA) tablet 90 mg  90 mg Oral BID    atorvastatin (LIPITOR) tablet 80 mg  80 mg Oral QHS     No Known Allergies      Review of Systems:  Review of systems not obtained due to patient factors.     Objective:   Vital Signs:    Visit Vitals    /68    Pulse 98    Temp 98.3 °F (36.8 °C)    Resp 19    Ht 5' 4\" (1.626 m)    Wt 98.9 kg (218 lb 0.6 oz)    SpO2 100%    BMI 37.43 kg/m2       O2 Device: Nasal cannula   O2 Flow Rate (L/min): 4 l/min   Temp (24hrs), Av.5 °F (36.9 °C), Min:97.7 °F (36.5 °C), Max:99 °F (37.2 °C)       Intake/Output:   Last shift:      701 - 1900  In: -   Out: 100 [Urine:100]  Last 3 shifts: 1901 - 700  In: 1167.6 [P.O.:240; I.V.:807.6]  Out: 5100 [Urine:4700]    Intake/Output Summary (Last 24 hours) at 18  Last data filed at 18 0800   Gross per 24 hour   Intake           699.61 ml   Output             2645 ml   Net         -1945.39 ml       Physical Exam:    General:  Awake. Alert, comfortable   Head:  Normocephalic, without obvious abnormality, atraumatic. Eyes:  Pink palpebral conjunctivae, anicteric sclerae; + pupils briskly reactive to light b/l    Nose: Nares normal. No drainage    Throat: Intubated    Neck: Supple, symmetrical, trachea midline, no adenopathy, thyroid: no enlargment/tenderness/nodules, no carotid bruit and no JVD. Lungs:   Symmetrical chest expansion, no accessory muscle use; + scattered expiratory wheezing on b/l lungs with bibasilar rales   Chest wall:  No deformity. Heart:  Regular rate and rhythm, S1, S2 present   Abdomen:   Soft, Bowel sounds normal.    Extremities: Extremities atraumatic, no cyanosis    Pulses: 2+ and symmetric all extremities. Skin: Skin color, texture, turgor normal. No rashes or lesions.  No hematoma surrounding groin lines   Lymph nodes: Cervical, supraclavicular nodes normal.   Neurologic: No focal findings       Data:     Recent Results (from the past 24 hour(s))   MAGNESIUM    Collection Time: 18 10:30 AM   Result Value Ref Range    Magnesium 2.4 1.6 - 2.6 mg/dL   PHOSPHORUS    Collection Time: 18 10:30 AM   Result Value Ref Range    Phosphorus 4.0 2.5 - 4.9 MG/DL   METABOLIC PANEL, BASIC    Collection Time: 18 10:30 AM   Result Value Ref Range    Sodium 137 136 - 145 mmol/L    Potassium 6.0 (H) 3.5 - 5.5 mmol/L    Chloride 106 100 - 108 mmol/L    CO2 20 (L) 21 - 32 mmol/L    Anion gap 11 3.0 - 18 mmol/L    Glucose 182 (H) 74 - 99 mg/dL    BUN 35 (H) 7.0 - 18 MG/DL    Creatinine 1.73 (H) 0.6 - 1.3 MG/DL    BUN/Creatinine ratio 20 12 - 20      GFR est AA 36 (L) >60 ml/min/1.73m2    GFR est non-AA 30 (L) >60 ml/min/1.73m2    Calcium 8.3 (L) 8.5 - 10.1 MG/DL   POTASSIUM    Collection Time: 05/21/18 11:25 AM   Result Value Ref Range    Potassium 5.3 3.5 - 5.5 mmol/L   GLUCOSE, POC    Collection Time: 05/21/18 11:35 AM   Result Value Ref Range    Glucose (POC) 179 (H) 70 - 110 mg/dL   LACTIC ACID    Collection Time: 05/21/18  4:35 PM   Result Value Ref Range    Lactic acid 1.0 0.4 - 2.0 MMOL/L   MAGNESIUM    Collection Time: 05/21/18  4:35 PM   Result Value Ref Range    Magnesium 2.3 1.6 - 2.6 mg/dL   PHOSPHORUS    Collection Time: 05/21/18  4:35 PM   Result Value Ref Range    Phosphorus 4.6 2.5 - 4.9 MG/DL   METABOLIC PANEL, BASIC    Collection Time: 05/21/18  4:35 PM   Result Value Ref Range    Sodium 138 136 - 145 mmol/L    Potassium 4.9 3.5 - 5.5 mmol/L    Chloride 105 100 - 108 mmol/L    CO2 21 21 - 32 mmol/L    Anion gap 12 3.0 - 18 mmol/L    Glucose 150 (H) 74 - 99 mg/dL    BUN 38 (H) 7.0 - 18 MG/DL    Creatinine 1.84 (H) 0.6 - 1.3 MG/DL    BUN/Creatinine ratio 21 (H) 12 - 20      GFR est AA 33 (L) >60 ml/min/1.73m2    GFR est non-AA 28 (L) >60 ml/min/1.73m2    Calcium 8.6 8.5 - 10.1 MG/DL   CBC W/O DIFF    Collection Time: 05/21/18  4:35 PM   Result Value Ref Range    WBC 13.6 (H) 4.6 - 13.2 K/uL    RBC 4.23 4.20 - 5.30 M/uL    HGB 11.8 (L) 12.0 - 16.0 g/dL    HCT 36.9 35.0 - 45.0 %    MCV 87.2 74.0 - 97.0 FL    MCH 27.9 24.0 - 34.0 PG    MCHC 32.0 31.0 - 37.0 g/dL    RDW 16.6 (H) 11.6 - 14.5 %    PLATELET 755 969 - 926 K/uL    MPV 10.0 9.2 - 11.8 FL   GLUCOSE, POC    Collection Time: 05/21/18  5:41 PM   Result Value Ref Range    Glucose (POC) 149 (H) 70 - 110 mg/dL   GLUCOSE, POC    Collection Time: 05/21/18 11:49 PM   Result Value Ref Range    Glucose (POC) 182 (H) 70 - 110 mg/dL   MAGNESIUM Collection Time: 05/22/18  1:00 AM   Result Value Ref Range    Magnesium 2.3 1.6 - 2.6 mg/dL   PHOSPHORUS    Collection Time: 05/22/18  1:00 AM   Result Value Ref Range    Phosphorus 5.1 (H) 2.5 - 4.9 MG/DL   METABOLIC PANEL, BASIC    Collection Time: 05/22/18  1:00 AM   Result Value Ref Range    Sodium 139 136 - 145 mmol/L    Potassium 4.5 3.5 - 5.5 mmol/L    Chloride 105 100 - 108 mmol/L    CO2 23 21 - 32 mmol/L    Anion gap 11 3.0 - 18 mmol/L    Glucose 184 (H) 74 - 99 mg/dL    BUN 41 (H) 7.0 - 18 MG/DL    Creatinine 1.94 (H) 0.6 - 1.3 MG/DL    BUN/Creatinine ratio 21 (H) 12 - 20      GFR est AA 31 (L) >60 ml/min/1.73m2    GFR est non-AA 26 (L) >60 ml/min/1.73m2    Calcium 8.3 (L) 8.5 - 10.1 MG/DL   POC G3    Collection Time: 05/22/18  4:04 AM   Result Value Ref Range    Device: VENT      FIO2 (POC) 0.40 %    pH (POC) 7.383 7.35 - 7.45      pCO2 (POC) 40.1 35.0 - 45.0 MMHG    pO2 (POC) 134 (H) 80 - 100 MMHG    HCO3 (POC) 23.9 22 - 26 MMOL/L    sO2 (POC) 99 (H) 92 - 97 %    Base deficit (POC) 1 mmol/L    Mode ASSIST CONTROL      Tidal volume 450 ml    Set Rate 16 bpm    PEEP/CPAP (POC) 5.0 cmH2O    Allens test (POC) YES      Inspiratory Time 1.00 sec    Total resp.  rate 16      Site RIGHT RADIAL      Specimen type (POC) ARTERIAL      Performed by Nidhi Shoulder     Volume control plus YES     GLUCOSE, POC    Collection Time: 05/22/18  5:52 AM   Result Value Ref Range    Glucose (POC) 192 (H) 70 - 041 mg/dL   METABOLIC PANEL, BASIC    Collection Time: 05/22/18  6:00 AM   Result Value Ref Range    Sodium 139 136 - 145 mmol/L    Potassium 4.4 3.5 - 5.5 mmol/L    Chloride 104 100 - 108 mmol/L    CO2 21 21 - 32 mmol/L    Anion gap 14 3.0 - 18 mmol/L    Glucose 178 (H) 74 - 99 mg/dL    BUN 45 (H) 7.0 - 18 MG/DL    Creatinine 2.06 (H) 0.6 - 1.3 MG/DL    BUN/Creatinine ratio 22 (H) 12 - 20      GFR est AA 29 (L) >60 ml/min/1.73m2    GFR est non-AA 24 (L) >60 ml/min/1.73m2    Calcium 8.1 (L) 8.5 - 10.1 MG/DL   CBC WITH AUTOMATED DIFF    Collection Time: 05/22/18  6:00 AM   Result Value Ref Range    WBC 13.1 4.6 - 13.2 K/uL    RBC 3.86 (L) 4.20 - 5.30 M/uL    HGB 10.5 (L) 12.0 - 16.0 g/dL    HCT 32.8 (L) 35.0 - 45.0 %    MCV 85.0 74.0 - 97.0 FL    MCH 27.2 24.0 - 34.0 PG    MCHC 32.0 31.0 - 37.0 g/dL    RDW 16.8 (H) 11.6 - 14.5 %    PLATELET 807 014 - 966 K/uL    MPV 9.9 9.2 - 11.8 FL    NEUTROPHILS 87 (H) 40 - 73 %    LYMPHOCYTES 9 (L) 21 - 52 %    MONOCYTES 4 3 - 10 %    EOSINOPHILS 0 0 - 5 %    BASOPHILS 0 0 - 2 %    ABS. NEUTROPHILS 11.4 (H) 1.8 - 8.0 K/UL    ABS. LYMPHOCYTES 1.2 0.9 - 3.6 K/UL    ABS. MONOCYTES 0.5 0.05 - 1.2 K/UL    ABS. EOSINOPHILS 0.0 0.0 - 0.4 K/UL    ABS. BASOPHILS 0.0 0.0 - 0.1 K/UL    DF AUTOMATED               Telemetry:normal sinus rhythm    Imaging:  CXR Results  (Last 48 hours)               05/22/18 0600  XR CHEST PORT Final result    Impression:  IMPRESSION:       Persistent but improved bilateral interstitial and airspace opacities suggesting   improving edema. Narrative:  PORTABLE CHEST RADIOGRAPH        CPT CODE: 80735        INDICATION: Acute respiratory failure, ET tube placement. COMPARISON: 5/21/2018. FINDINGS:       ET tube is present with tip approximately 2.8 cm above the ramos. The   cardiomediastinal silhouette is mildly enlarged, unchanged. Calcifications   present in the thoracic aorta. Persistent increased interstitial opacities are   noted bilaterally, overall improved compared to the prior exam. Right   perihilar/infrahilar airspace opacity has mildly improved. Blunting of the left   costophrenic angle potentially related to a small effusion. No overt   pneumothorax. Osseous structures unchanged. 05/21/18 0020  XR CHEST PORT Final result    Impression:  IMPRESSION:        1. Endotracheal tube appears to be in satisfactory position.    2. Worsening interstitial and bibasilar opacities, favors edema given rapid   progression but infectious process not entirely excluded. Narrative:  AP CHEST, PORTABLE       INDICATION: Endotracheal tube placement       COMPARISON: Prior chest x-rays, most recent 5/20/2018       FINDINGS:  EKG leads overlie the patient. Endotracheal tube tip projects   approximately 3.3 cm above the ramos. Cardiac silhouette is mildly enlarged. Atherosclerosis noted. Worsening of   diffuse interstitial edema and increased confluence of bibasilar hazy opacities. No definite pleural effusion or pneumothorax. No acute osseous abnormalities are   identified. 05/20/18 1937  XR CHEST PORT Final result    Impression:  IMPRESSION:        1. Diffuse prominence of the bronchovascular markings, probably represents   interstitial edema but atypical infectious process/bronchitis not excluded. 2. Mild bibasilar hazy opacities, possibly atelectasis or developing   edema/infiltrate. Narrative:  AP CHEST, PORTABLE       INDICATION: Severe chest pain and shortness of breath density and 9, right arm   pain that began 2 days ago       COMPARISON: Prior chest x-rays, most recent 5/3/2016       FINDINGS:  EKG leads overlie the patient. The cardiac silhouette is normal in size. Atherosclerosis noted. Prominence and   indistinctness of the bronchovascular markings, greatest at the lung bases. Mild   bibasilar haziness. Costophrenic sulci appear sharp. No evidence for   pneumothorax. Spondylosis and degenerative changes of the shoulders are   suboptimally evaluated. No acute osseous abnormalities are identified. CT Results  (Last 48 hours)    None                    [x] Total critical care time spent on reviewing the case/medical record/data/notes/EMR/patient examination/documentation/coordinating care with nurse/consultants, exclusive of procedures  40 minutes with complex decision making performed and > 50% time spent in face to face evaluation.   During this entire length of time I was immediately available to the patient. The reason for providing this level of medical care for this critically ill patient was due a critical illness that impaired one or more vital organ systems such that there was a high probability of imminent or life threatening deterioration in the patients condition.  This care involved high complexity decision making to assess, manipulate, and support vital system functions, to treat this degreee vital organ system failure and to prevent further life threatening deterioration of the patients condition      Jihan Salgado MD

## 2018-05-22 NOTE — PROGRESS NOTES
1930:  Received report from BODØ, [address] [facility]. Reviewed labs, medications, allergies, and chart. Bedside rounds with FLORENCIA GRECO. Propofol drip, groin sites and pulses checked with off going RN. Care assumed. 1945:  Assessed patient. VSS. See flowsheet for assessment details. Bilateral groin sites WNL and distal pulses easily palpable. Patient on 35 mcg/kg/min Propofol drip and awake and following commands. 2125:  Patient displaying non verbal cues of pain. Grimacing, flexing arms, and fighting ventilator. 25mcg Fentanyl given for pain. Will continue to monitor. 2220:  Patient agitated. Moving extrmeities, trying to talk, coughing, breathing over ventilator and biting ETT. Propofol increased to 50 mcg/kg/min. All other VS stable. Will continue to monitor. 2245:  Patient appears to be better sedated and more comfortable with the increase in the Propofol drip. VSS, Will continue to monitor. 0000:  Vital signs remain stable. Groin sites checked frequently with no issues noted. Patient remains comfortably sedated. Turned Q2H to protect skin and restraints released and reapplied and documented per protocol. 0100:  Labs drawn per orders and sent to lab, awaiting results. 0130:  K+ improved and is now 4.5. Creatnine trending up at 1.94. Good urine output. Will continue to monitor. 0630:  Spoke with Dr. Olman Alves. ABG looked good this AM.  Order to weaned sedation and start spont breathing trial to wean to extubation. 0700:  Report given to Katya Lundberg RN.

## 2018-05-22 NOTE — DIABETES MGMT
Diabetes Patient/Family Education Record    Factors That  May Influence Patients Ability  to Learn or  Comply with Recommendations   []   Language barrier    []   Cultural needs   []   Motivation    []   Cognitive limitation    []   Physical   [x]   Education    []   Physiological factors   []   Hearing/vision/speaking impairment   []   Evangelical beliefs    []   Financial factors   []  Other:   []  No factors identified at this time. Person Instructed:   [x]   Patient   []   Family   []  Other     Preference for Learning:   [x]   Verbal   [x]   Written   []  Demonstration     Level of Comprehension & Competence:    [x]  Good                                      [] Fair                                     []  Poor                             []  Needs Reinforcement   [x]  Teachback completed    Education Component:   [x]  Medication management, including how to administer insulin (if appropriate) and potential medication interactions: Patient stated that she is on levemir insulin 15 units daily at home every morning. [x]  Nutritional management: Patient stated that she can use additional education information. Educated patient about the plate method for serving size/portion control of carbs (starches, fruits, dairy) and limiting intake of concentrated sweets including regular beverages and snacks like cookies, candies, etc.   [x]  Exercise: Encouraged patient to follow her medical provider's exercise recommendation. [x]  Signs, symptoms, and treatment of hyperglycemia and hypoglycemia   [x] Prevention, recognition and treatment of hyperglycemia and hypoglycemia   [x]  Importance of blood glucose monitoring and how to obtain a blood glucose meter: Patient stated that she has BG meter and testing supplies.  She is checking her blood sugar daily at home.   []  Instruction on use of the blood glucose meter   [x]  Discuss the importance of HbA1C monitoring: Discussed with patient her current A1c level of 6.65 (05/21/2018) which is equivalent to estimated average blood glucose of 143 mg/dL during the past 2-3 months. Encouraged patient to continue to follow her diabetes treatment plan: med, nutrition, and recommended regular exercise by her medical provider to help maintain diabetes control.     [x]  Sick day guidelines   [x]  Proper use and disposal of lancets, needles, syringes or insulin pens (if appropriate)   [x]  Potential long-term complications (retinopathy, kidney disease, neuropathy, foot care)   [x] Information about whom to contact in case of emergency or for more information    [x]  Goal:  Patient/family will demonstrate understanding of Diabetes Self Management Skills by: 05/29/2018  Plan for post-discharge education or self-management support:    [x] Outpatient class schedule provided            [] Patient Declined    [] Scheduled for outpatient classes (date) _______     Candace Newsome RN  pgr [phone]

## 2018-05-23 ENCOUNTER — APPOINTMENT (OUTPATIENT)
Dept: GENERAL RADIOLOGY | Age: 68
DRG: 246 | End: 2018-05-23
Attending: PHYSICIAN ASSISTANT
Payer: MEDICARE

## 2018-05-23 VITALS
RESPIRATION RATE: 24 BRPM | SYSTOLIC BLOOD PRESSURE: 139 MMHG | HEIGHT: 64 IN | TEMPERATURE: 98.1 F | WEIGHT: 213 LBS | OXYGEN SATURATION: 98 % | DIASTOLIC BLOOD PRESSURE: 69 MMHG | BODY MASS INDEX: 36.37 KG/M2 | HEART RATE: 98 BPM

## 2018-05-23 LAB
ALBUMIN SERPL-MCNC: 3.3 G/DL (ref 3.4–5)
ALBUMIN/GLOB SERPL: 1 {RATIO} (ref 0.8–1.7)
ALP SERPL-CCNC: 84 U/L (ref 45–117)
ALT SERPL-CCNC: 58 U/L (ref 13–56)
ANION GAP SERPL CALC-SCNC: 8 MMOL/L (ref 3–18)
AST SERPL-CCNC: 197 U/L (ref 15–37)
BACTERIA SPEC CULT: NORMAL
BASOPHILS # BLD: 0 K/UL (ref 0–0.06)
BASOPHILS NFR BLD: 0 % (ref 0–2)
BILIRUB SERPL-MCNC: 0.3 MG/DL (ref 0.2–1)
BUN SERPL-MCNC: 51 MG/DL (ref 7–18)
BUN/CREAT SERPL: 28 (ref 12–20)
CALCIUM SERPL-MCNC: 8.5 MG/DL (ref 8.5–10.1)
CHLORIDE SERPL-SCNC: 107 MMOL/L (ref 100–108)
CO2 SERPL-SCNC: 23 MMOL/L (ref 21–32)
CREAT SERPL-MCNC: 1.79 MG/DL (ref 0.6–1.3)
DIFFERENTIAL METHOD BLD: ABNORMAL
EOSINOPHIL # BLD: 0 K/UL (ref 0–0.4)
EOSINOPHIL NFR BLD: 0 % (ref 0–5)
ERYTHROCYTE [DISTWIDTH] IN BLOOD BY AUTOMATED COUNT: 16.9 % (ref 11.6–14.5)
GLOBULIN SER CALC-MCNC: 3.4 G/DL (ref 2–4)
GLUCOSE BLD STRIP.AUTO-MCNC: 199 MG/DL (ref 70–110)
GLUCOSE SERPL-MCNC: 170 MG/DL (ref 74–99)
HCT VFR BLD AUTO: 31.5 % (ref 35–45)
HGB BLD-MCNC: 10 G/DL (ref 12–16)
LYMPHOCYTES # BLD: 0.8 K/UL (ref 0.9–3.6)
LYMPHOCYTES NFR BLD: 7 % (ref 21–52)
MAGNESIUM SERPL-MCNC: 2.6 MG/DL (ref 1.6–2.6)
MCH RBC QN AUTO: 27.5 PG (ref 24–34)
MCHC RBC AUTO-ENTMCNC: 31.7 G/DL (ref 31–37)
MCV RBC AUTO: 86.8 FL (ref 74–97)
MONOCYTES # BLD: 0.7 K/UL (ref 0.05–1.2)
MONOCYTES NFR BLD: 6 % (ref 3–10)
NEUTS SEG # BLD: 9.8 K/UL (ref 1.8–8)
NEUTS SEG NFR BLD: 87 % (ref 40–73)
PHOSPHATE SERPL-MCNC: 3.2 MG/DL (ref 2.5–4.9)
PLATELET # BLD AUTO: 298 K/UL (ref 135–420)
PMV BLD AUTO: 9.8 FL (ref 9.2–11.8)
POTASSIUM SERPL-SCNC: 4.9 MMOL/L (ref 3.5–5.5)
PROT SERPL-MCNC: 6.7 G/DL (ref 6.4–8.2)
RBC # BLD AUTO: 3.63 M/UL (ref 4.2–5.3)
SERVICE CMNT-IMP: NORMAL
SODIUM SERPL-SCNC: 138 MMOL/L (ref 136–145)
WBC # BLD AUTO: 11.3 K/UL (ref 4.6–13.2)

## 2018-05-23 PROCEDURE — 82962 GLUCOSE BLOOD TEST: CPT

## 2018-05-23 PROCEDURE — 85025 COMPLETE CBC W/AUTO DIFF WBC: CPT | Performed by: INTERNAL MEDICINE

## 2018-05-23 PROCEDURE — 74011250637 HC RX REV CODE- 250/637: Performed by: INTERNAL MEDICINE

## 2018-05-23 PROCEDURE — 80053 COMPREHEN METABOLIC PANEL: CPT | Performed by: INTERNAL MEDICINE

## 2018-05-23 PROCEDURE — 94640 AIRWAY INHALATION TREATMENT: CPT

## 2018-05-23 PROCEDURE — 84100 ASSAY OF PHOSPHORUS: CPT | Performed by: INTERNAL MEDICINE

## 2018-05-23 PROCEDURE — 36415 COLL VENOUS BLD VENIPUNCTURE: CPT | Performed by: INTERNAL MEDICINE

## 2018-05-23 PROCEDURE — 74011250636 HC RX REV CODE- 250/636: Performed by: INTERNAL MEDICINE

## 2018-05-23 PROCEDURE — 83735 ASSAY OF MAGNESIUM: CPT | Performed by: INTERNAL MEDICINE

## 2018-05-23 PROCEDURE — 74011000250 HC RX REV CODE- 250: Performed by: PHYSICIAN ASSISTANT

## 2018-05-23 PROCEDURE — 74011636637 HC RX REV CODE- 636/637: Performed by: INTERNAL MEDICINE

## 2018-05-23 RX ORDER — GUAIFENESIN 100 MG/5ML
81 LIQUID (ML) ORAL DAILY
Qty: 30 TAB | Refills: 0 | Status: SHIPPED | OUTPATIENT
Start: 2018-05-24 | End: 2019-07-19 | Stop reason: SDUPTHER

## 2018-05-23 RX ORDER — OXYCODONE AND ACETAMINOPHEN 5; 325 MG/1; MG/1
1 TABLET ORAL
Qty: 10 TAB | Refills: 0 | Status: SHIPPED | OUTPATIENT
Start: 2018-05-23 | End: 2018-07-12 | Stop reason: ALTCHOICE

## 2018-05-23 RX ORDER — ATORVASTATIN CALCIUM 20 MG/1
20 TABLET, FILM COATED ORAL
Qty: 30 TAB | Refills: 0 | Status: SHIPPED | OUTPATIENT
Start: 2018-05-23 | End: 2019-11-18 | Stop reason: DRUGHIGH

## 2018-05-23 RX ORDER — CARVEDILOL 6.25 MG/1
6.25 TABLET ORAL 2 TIMES DAILY WITH MEALS
Qty: 60 TAB | Refills: 0 | Status: SHIPPED | OUTPATIENT
Start: 2018-05-23 | End: 2018-06-21 | Stop reason: SDUPTHER

## 2018-05-23 RX ORDER — ATORVASTATIN CALCIUM 20 MG/1
20 TABLET, FILM COATED ORAL
Status: DISCONTINUED | OUTPATIENT
Start: 2018-05-23 | End: 2018-05-23 | Stop reason: HOSPADM

## 2018-05-23 RX ADMIN — SODIUM CHLORIDE 20 MG: 9 INJECTION INTRAMUSCULAR; INTRAVENOUS; SUBCUTANEOUS at 08:23

## 2018-05-23 RX ADMIN — ASPIRIN 81 MG 81 MG: 81 TABLET ORAL at 08:23

## 2018-05-23 RX ADMIN — IPRATROPIUM BROMIDE AND ALBUTEROL SULFATE 3 ML: .5; 3 SOLUTION RESPIRATORY (INHALATION) at 00:10

## 2018-05-23 RX ADMIN — FERROUS SULFATE TAB 325 MG (65 MG ELEMENTAL FE) 325 MG: 325 (65 FE) TAB at 08:23

## 2018-05-23 RX ADMIN — OXYCODONE HYDROCHLORIDE AND ACETAMINOPHEN 1 TABLET: 5; 325 TABLET ORAL at 08:22

## 2018-05-23 RX ADMIN — IPRATROPIUM BROMIDE AND ALBUTEROL SULFATE 3 ML: .5; 3 SOLUTION RESPIRATORY (INHALATION) at 05:08

## 2018-05-23 RX ADMIN — IPRATROPIUM BROMIDE AND ALBUTEROL SULFATE 3 ML: .5; 3 SOLUTION RESPIRATORY (INHALATION) at 16:10

## 2018-05-23 RX ADMIN — METHYLPREDNISOLONE SODIUM SUCCINATE 40 MG: 125 INJECTION, POWDER, FOR SOLUTION INTRAMUSCULAR; INTRAVENOUS at 08:27

## 2018-05-23 RX ADMIN — IPRATROPIUM BROMIDE AND ALBUTEROL SULFATE 3 ML: .5; 3 SOLUTION RESPIRATORY (INHALATION) at 08:33

## 2018-05-23 RX ADMIN — INSULIN LISPRO 2 UNITS: 100 INJECTION, SOLUTION INTRAVENOUS; SUBCUTANEOUS at 11:30

## 2018-05-23 RX ADMIN — Medication 10 ML: at 05:36

## 2018-05-23 RX ADMIN — TICAGRELOR 90 MG: 90 TABLET ORAL at 17:05

## 2018-05-23 RX ADMIN — FERROUS SULFATE TAB 325 MG (65 MG ELEMENTAL FE) 325 MG: 325 (65 FE) TAB at 17:05

## 2018-05-23 RX ADMIN — TICAGRELOR 90 MG: 90 TABLET ORAL at 08:23

## 2018-05-23 RX ADMIN — CARVEDILOL 6.25 MG: 6.25 TABLET, FILM COATED ORAL at 08:23

## 2018-05-23 RX ADMIN — IPRATROPIUM BROMIDE AND ALBUTEROL SULFATE 3 ML: .5; 3 SOLUTION RESPIRATORY (INHALATION) at 11:54

## 2018-05-23 RX ADMIN — INSULIN GLARGINE 15 UNITS: 100 INJECTION, SOLUTION SUBCUTANEOUS at 08:23

## 2018-05-23 NOTE — PROGRESS NOTES
Reason for Admission:   STEMI                  RRAT Score:     19             Do you (patient/family) have any concerns for transition/discharge? None               Plan for utilizing home health:         Likelihood of readmission? Yellow/Moderate            Transition of Care Plan:    Pt is alert and oriented. She resides in the home with her spouse Althea Lindsey ([phone]) and her plan is to return home at discharge. Pt is active with [facility]. She is independent with self care and ambulation prior to admission. Pt's spouse will assist her with transportation home at discharge. Care Management Interventions  PCP Verified by CM: Yes (Pt is active with [facility]iel Greene. )  Current Support Network: Lives with Spouse  Confirm Follow Up Transport: Family  Plan discussed with Pt/Family/Caregiver:  Yes

## 2018-05-23 NOTE — DIABETES MGMT
Glycemic Control Plan of Care    T2DM with current A1c of 6.6% (05/21/2018). See separate notes for assessment of home diabetes management and eduction, 05/22/2018. Home diabetes med: Levemir insulin 15 units daily every morning. POC BG range on 05/22/2018: 134-192 mg/dL. Increased basal lantus insulin dose from 10 to 15 units and continued on correctional lispro insulin. POC BG report on 05/23/2018 at time of review: Not done this morning but BG report of 99 now before lunch. Seen patient this morning and she verbalized understanding that she is getting lantus insulin 15 units daily while in house. Patient aware that levemir insulin is not available in hospital. Both levemir and lantus are long acting insulin. Instructed patient not to resume levemir insulin until tomorrow if she's discharge today, 05/23/2018. IV Solumedrol 40 mg every twelve hours discontinued this morning. Recommendation(s):  1.) Continue current insulin orders: basal and correctional.    Assessment:  Patient is 74year old with past medical history including type 2 diabetes mellitus, COPD, hypertension,  STEMI, dyslipidemia, decreased GFR, dilated cardiomyopathy, and obesity - was admitted on 05/20/2018 with report of chest and left shoulder pain, feeling of heaviness, and shortness of breath. Noted:  STEMI status post YADIRA to proximal LAD with resolute stent. Acute respiratory failure. COPD. T2DM with current A1c of 6.6% (05/21/2018). Most recent blood glucose values:    Results for Jaret Yates (MRN [id number]) as of 5/23/2018 12:05   Ref. Range 5/22/2018 05:52 5/22/2018 11:29 5/22/2018 17:00 5/22/2018 22:48   GLUCOSE,FAST - POC Latest Ref Range: 70 - 110 mg/dL 192 (H) 190 (H) 176 (H) 134 (H)     Current A1C: 6.6% (05/21/2018) is equivalent to average blood glucose of 143 mg/dL during the past 2-3 months.     Current hospital diabetes medications:  Increased basal lantus insulin to 15 units since 05/22/2018  Correctional lispro insulin ACHS. Normal sensitivity dose. Total daily dose insulin requirement previous day: 05/22/2018  Lantus: 15 units  Lispro: 6 units  TDD: 21 units of insulin    Home diabetes medications: Patient reported on 05/22/2018:  Levemir insulin 15 units daily every morning. Diet: Diabetic consistent carb soft solids; chopped meats.     Goals:  Blood glucose will be within target range of  mg/dL by 05/26/2018    Education:  _X__  Refer to Diabetes Education Record: 0522/2018             ___  Education not indicated at this time    Russell Crawford RN [facility]  Pager: [phone]

## 2018-05-23 NOTE — CARDIO/PULMONARY
Cardiac rehab in to see patient. She was sitting in chair. She stated that she was doing much better. We talked about relaxation, exercise and nutrition. She stated that she watches comedies for relaxation each day. We then dicussed exercise. She stated that she walks as she does not drive. Encouraged patient to get back to walking at least 3 days per week for 30 minutes. Encouraged her to begin walking in house and gradually build up to 30 mins. She stated that she would do that. We also discussed outpatient cardiac rehab and that they would be giving her a call to discuss the program further. We then talked about her diet. She stated that she will have to make changes. Encouraged her to bake, grill, broil, steam and roast her foods. Advised her to choose fresh and fresh frozen as much as possible. She stated that she will make sure that she is eating appropriately. She appreciated the visit. Will follow.

## 2018-05-23 NOTE — PROGRESS NOTES
0733-Received report from [address] [place] [address], patient is stable and resting in bed. No issues noted. 0814-patient up to restroom and then to recliner.

## 2018-05-23 NOTE — PROGRESS NOTES
Shift Summary:  Pain controlled with PRN pain medication. Ambulates independently. VSS. Weaned off O2. Groin sites WNL, +2 distal pulses.

## 2018-05-23 NOTE — DISCHARGE INSTRUCTIONS
Cardiac Catheterization/Angiography Discharge Instructions    *Check the puncture site frequently for swelling or bleeding. If you see any bleeding, lie down and apply pressure over the area with a clean town or washcloth. Notify your doctor for any redness, swelling, drainage or oozing from the puncture site. Notify your doctor for any fever or chills. *If the leg or arm with the puncture becomes cold, numb or painful, call your cardiologist.    *Activity should be limited for the next 48 hours. Climb stairs as little as possible and avoid any stooping, bending or strenuous activity for 48 hours. No heavy lifting (anything over 10 pounds) for three days. *Do not drive for 48 hours. *You may resume your usual diet. Drink more fluids than usual.    *Have a responsible person drive you home and stay with you for at least 24 hours after your heart catheterization/angiography. *You may remove the bandage from your groins in 24 hours. You may shower in 24 hours. No tub baths, hot tubs or swimming for one week. Do not place any lotions, creams, powders, ointments over the puncture site for one week. You may place a clean band-aid over the puncture site each day for 5 days. Change this daily. Low Sodium Diet (2,000 Milligram): Care Instructions  Your Care Instructions    Too much sodium causes your body to hold on to extra water. This can raise your blood pressure and force your heart and kidneys to work harder. In very serious cases, this could cause you to be put in the hospital. It might even be life-threatening. By limiting sodium, you will feel better and lower your risk of serious problems. The most common source of sodium is salt. People get most of the salt in their diet from canned, prepared, and packaged foods. Fast food and restaurant meals also are very high in sodium. Your doctor will probably limit your sodium to less than 2,000 milligrams (mg) a day.  This limit counts all the sodium in prepared and packaged foods and any salt you add to your food. Follow-up care is a key part of your treatment and safety. Be sure to make and go to all appointments, and call your doctor if you are having problems. It's also a good idea to know your test results and keep a list of the medicines you take. How can you care for yourself at home? Read food labels  · Read labels on cans and food packages. The labels tell you how much sodium is in each serving. Make sure that you look at the serving size. If you eat more than the serving size, you have eaten more sodium. · Food labels also tell you the Percent Daily Value for sodium. Choose products with low Percent Daily Values for sodium. · Be aware that sodium can come in forms other than salt, including monosodium glutamate (MSG), sodium citrate, and sodium bicarbonate (baking soda). MSG is often added to Asian food. When you eat out, you can sometimes ask for food without MSG or added salt. Buy low-sodium foods  · Buy foods that are labeled \"unsalted\" (no salt added), \"sodium-free\" (less than 5 mg of sodium per serving), or \"low-sodium\" (less than 140 mg of sodium per serving). Foods labeled \"reduced-sodium\" and \"light sodium\" may still have too much sodium. Be sure to read the label to see how much sodium you are getting. · Buy fresh vegetables, or frozen vegetables without added sauces. Buy low-sodium versions of canned vegetables, soups, and other canned goods. Prepare low-sodium meals  · Cut back on the amount of salt you use in cooking. This will help you adjust to the taste. Do not add salt after cooking. One teaspoon of salt has about 2,300 mg of sodium. · Take the salt shaker off the table. · Flavor your food with garlic, lemon juice, onion, vinegar, herbs, and spices. Do not use soy sauce, lite soy sauce, steak sauce, onion salt, garlic salt, celery salt, mustard, or ketchup on your food.   · Use low-sodium salad dressings, sauces, and ketchup. Or make your own salad dressings and sauces without adding salt. · Use less salt (or none) when recipes call for it. You can often use half the salt a recipe calls for without losing flavor. Other foods such as rice, pasta, and grains do not need added salt. · Rinse canned vegetables, and cook them in fresh water. This removes some-but not all-of the salt. · Avoid water that is naturally high in sodium or that has been treated with water softeners, which add sodium. Call your local water company to find out the sodium content of your water supply. If you buy bottled water, read the label and choose a sodium-free brand. Avoid high-sodium foods  · Avoid eating:  ¨ Smoked, cured, salted, and canned meat, fish, and poultry. ¨ Ham, pen[place], hot dogs, and luncheon meats. ¨ Regular, hard, and processed cheese and regular peanut butter. ¨ Crackers with salted tops, and other salted snack foods such as pretzels, chips, and salted popcorn. ¨ Frozen prepared meals, unless labeled low-sodium. ¨ Canned and dried soups, broths, and bouillon, unless labeled sodium-free or low-sodium. ¨ Canned vegetables, unless labeled sodium-free or low-sodium. ¨ [place] Kylie fries, pizza, tacos, and other fast foods. ¨ Pickles, olives, ketchup, and other condiments, especially soy sauce, unless labeled sodium-free or low-sodium. Where can you learn more? Go to http://jason-charity.info/. Enter W194 in the search box to learn more about \"Low Sodium Diet (2,000 Milligram): Care Instructions. \"  Current as of: May 12, 2017  Content Version: 11.4  © 4867-2942 [employer]. Care instructions adapted under license by [employer] (which disclaims liability or warranty for this information).  If you have questions about a medical condition or this instruction, always ask your healthcare professional. [address] any warranty or liability for your use of this information. Learning About Benefits From Quitting Smoking  How does quitting smoking make you healthier? If you're thinking about quitting smoking, you may have a few reasons to be smoke-free. Your health may be one of them. · When you quit smoking, you lower your risks for cancer, lung disease, heart attack, stroke, blood vessel disease, and blindness from macular degeneration. · When you're smoke-free, you get sick less often, and you heal faster. You are less likely to get colds, flu, bronchitis, and pneumonia. · As a nonsmoker, you may find that your mood is better and you are less stressed. When and how will you feel healthier? Quitting has real health benefits that start from day 1 of being smoke-free. And the longer you stay smoke-free, the healthier you get and the better you feel. The first hours  · After just 20 minutes, your blood pressure and heart rate go down. That means there's less stress on your heart and blood vessels. · Within 12 hours, the level of carbon monoxide in your blood drops back to normal. That makes room for more oxygen. With more oxygen in your body, you may notice that you have more energy than when you smoked. After 2 weeks  · Your lungs start to work better. · Your risk of heart attack starts to drop. After 1 month  · When your lungs are clear, you cough less and breathe deeper, so it's easier to be active. · Your sense of taste and smell return. That means you can enjoy food more than you have since you started smoking. Over the years  · After 1 year, your risk of heart disease is half what it would be if you kept smoking. · After 5 years, your risk of stroke starts to shrink. Within a few years after that, it's about the same as if you'd never smoked. · After 10 years, your risk of dying from lung cancer is cut by about half. And your risk for many other types of cancer is lower too. How would quitting help others in your life?   When you quit smoking, you improve the health of everyone who now breathes in your smoke. · Their heart, lung, and cancer risks drop, much like yours. · They are sick less. For babies and small children, living smoke-free means they're less likely to have ear infections, pneumonia, and bronchitis. · If you're a woman who is or will be pregnant someday, quitting smoking means a healthier . · Children who are close to you are less likely to become adult smokers. Where can you learn more? Go to http://jason-charity.info/. Enter [id number] [id number] [id number] in the search box to learn more about \"Learning About Benefits From Quitting Smoking. \"  Current as of: 2017  Content Version: 11.4  © 3887-7190 [employer]. Care instructions adapted under license by [employer] (which disclaims liability or warranty for this information). If you have questions about a medical condition or this instruction, always ask your healthcare professional. [place] [address] any warranty or liability for your use of this information. Carvedilol (By mouth)   Carvedilol ([id number])  Treats high blood pressure and heart failure. Also reduces the risk of death after a heart attack. This medicine is a beta-blocker. Brand Name(s): Coreg, Coreg CR   There may be other brand names for this medicine. When This Medicine Should Not Be Used: This medicine is not right for everyone. Do not use it if you had an allergic reaction to carvedilol, or if you have asthma, severe liver disease, or certain heart problems. Ask your doctor about these heart problems. How to Use This Medicine:   Long Acting Capsule, Tablet  · Take your medicine as directed. Your dose may need to be changed several times to find what works best for you. · It is best to take this medicine with food or milk. · Extended-release capsule instructions:   ¨ Take the capsule in the morning with food. ¨ Swallow the capsule whole.  Do not crush or chew it. ¨ If you cannot swallow the capsule, you may open it and sprinkle the medicine over a spoonful of applesauce. Swallow the applesauce right away. · Read and follow the patient instructions that come with this medicine. Talk to your doctor or pharmacist if you have any questions. · Store the medicine in a closed container at room temperature, away from heat, moisture, and direct light. · Missed dose: Take a dose as soon as you remember. If it is almost time for your next dose, wait until then and take a regular dose. Do not take extra medicine to make up for a missed dose. Drugs and Foods to Avoid:   Ask your doctor or pharmacist before using any other medicine, including over-the-counter medicines, vitamins, and herbal products. · Some medicines can affect how carvedilol works. Tell your doctor if you are using amiodarone, clonidine, diltiazem, cyclosporine, digoxin, fluconazole, reserpine, rifampin, verapamil, or an MAO inhibitor (MAOI). Warnings While Using This Medicine:   · Tell your doctor if you are pregnant or breastfeeding, or if you have kidney disease, liver disease, bradycardia (slow heartbeat), coronary artery disease, circulation problems, edema (fluid retention or swelling), heart or blood vessel problems, low blood pressure, lung problems (such as bronchitis or emphysema), an overactive thyroid, pheochromocytoma, or frequent chest pains. Tell your doctor if you have a history of severe allergic reactions or if you are scheduled to have surgery. · This medicine may cause the following problems:   ¨ Changes to your blood sugar level (if you have diabetes, report any blood sugar level changes to your doctor)  ¨ Fewer tears than usual in contact lens wearers  ¨ An eye problem called Intraoperative Floppy Iris Syndrome during cataract surgery  · This medicine may make you dizzy or drowsy. Do not drive, use machines, or do anything else that could be dangerous if you are not alert.   · Do not stop using this medicine suddenly. Your doctor will need to slowly decrease your dose before you stop it completely. · Keep all medicine out of the reach of children. Never share your medicine with anyone. Possible Side Effects While Using This Medicine:   Call your doctor right away if you notice any of these side effects:  · Allergic reaction: Itching or hives, swelling in your face or hands, swelling or tingling in your mouth or throat, chest tightness, trouble breathing  · Change in how much or how often you urinate  · Chest pain that may spread to your arms, jaw, back, or neck, trouble breathing, nausea, unusual sweating, faintness  · Leg pain when you walk, legs and feet that feel cold or numb  · Lightheadedness, dizziness, or fainting  · Rapid weight gain, swelling in your hands, ankles, or feet  · Shaking, trembling, sweating, hunger, confusion  · Slow, fast, or uneven heartbeat  · Unusual bleeding or bruising  · Wheezing or trouble breathing  If you notice these less serious side effects, talk with your doctor:   · Diarrhea  · Trouble having sex  · Unusual tiredness or weakness  If you notice other side effects that you think are caused by this medicine, tell your doctor. Call your doctor for medical advice about side effects. You may report side effects to FDA at 8-510-FDA-9120  © 2017 [address] Information is for End User's use only and may not be sold, redistributed or otherwise used for commercial purposes. The above information is an  only. It is not intended as medical advice for individual conditions or treatments. Talk to your doctor, nurse or pharmacist before following any medical regimen to see if it is safe and effective for you. Ticagrelor (By mouth)   Ticagrelor ([id number])  Helps prevent stroke, heart attack, and other heart problems. This medicine is a blood thinner.    Brand Name(s): Brilinta   There may be other brand names for this medicine. When This Medicine Should Not Be Used: This medicine is not right for everyone. Do not use it if you had an allergic reaction to ticagrelor, or if you have bleeding problems (such as a bleeding stomach ulcer) or a history of bleeding in your brain. How to Use This Medicine:   Tablet  · Your doctor will tell you how much medicine to use. Do not use more than directed. Take this medicine at the same time every day. · Your doctor may tell you to take aspirin with this medicine. Do not use more than 100 milligrams of aspirin per day. Check the labels of other medicines to make sure they do not contain aspirin. · If you cannot swallow the tablet, you may do this:   ¨ Crush the tablet and mix it in a glass of water. Drink it right away. Rinse the glass with more water and drink that too, so you get all the medicine. ¨ You may give the tablet and water mixture through a nasogastric tube. Flush the tube with more water so you receive all the medicine. · This medicine should come with a Medication Guide. Ask your pharmacist for a copy if you do not have one. · Missed dose: Skip the missed dose and take your next dose as usual. Do not take extra medicine to make up for a missed dose. · Store the medicine in a closed container at room temperature, away from heat, moisture, and direct light. Drugs and Foods to Avoid:   Ask your doctor or pharmacist before using any other medicine, including over-the-counter medicines, vitamins, and herbal products. · Some medicines can affect how ticagrelor works.  Tell your doctor if you are using any of the following:  ¨ Atazanavir, carbamazepine, clarithromycin, digoxin, indinavir, itraconazole, ketoconazole, lovastatin, nefazodone, nelfinavir, phenobarbital, phenytoin, rifampin, ritonavir, saquinavir, simvastatin, telithromycin, or voriconazole  ¨ Blood thinner (including warfarin or heparin)  ¨ NSAID pain or arthritis medicine (including celecoxib, diclofenac, ibuprofen, naproxen)  Warnings While Using This Medicine:   · Tell your doctor if you are pregnant or breastfeeding, or if you have liver disease, heart rhythm problems (including slow heartbeat), lung or breathing problems (such as asthma or COPD), or a history of bleeding problems. · This medicine may cause you to bleed and bruise more easily, and it may take longer than usual for bleeding to stop. Be careful to avoid injuries. · Do not stop using this medicine unless your doctor tells you to. To stop it may increase your risk of a heart attack, blood clot, or other serious problem. · Tell any doctor or dentist who treats you that you are using this medicine. With your doctor's permission, you may need to stop using this medicine several days before you have surgery to reduce the risk of bleeding problems. Follow your doctor's instructions carefully. · Your doctor will do lab tests at regular visits to check on the effects of this medicine. Keep all appointments. · Keep all medicine out of the reach of children. Never share your medicine with anyone. Possible Side Effects While Using This Medicine:   Call your doctor right away if you notice any of these side effects:  · Allergic reaction: Itching or hives, swelling in your face or hands, swelling or tingling in your mouth or throat, chest tightness, trouble breathing  · Bloody or black, tarry stools, red or dark brown urine  · Fast, slow, or pounding heartbeat  · Trouble breathing  · Unusual bleeding, bruising, or weakness  · Vomiting of blood or material that looks like coffee grounds  If you notice other side effects that you think are caused by this medicine, tell your doctor. Call your doctor for medical advice about side effects. You may report side effects to FDA at 6-273-FDA-4772  © 2017 [address] Information is for End User's use only and may not be sold, redistributed or otherwise used for commercial purposes.   The above information is an  only. It is not intended as medical advice for individual conditions or treatments. Talk to your doctor, nurse or pharmacist before following any medical regimen to see if it is safe and effective for you.

## 2018-05-23 NOTE — PROGRESS NOTES
[facility]  -  Progress Note      Patient: Craig Mata MRN: [id number]  SSN: [id number]    YOB: 1950  Age: 74 y.o. Sex: female      Admit Date: 5/20/2018    Hospital Problem List:     Hospital Problems  Date Reviewed: 5/20/2018          Codes Class Noted POA    * (Principal)STEMI (ST elevation myocardial infarction) ([facility] ICD-10-CM: I21.3  ICD-9-CM: 410.90  5/20/2018 Yes        Acute pulmonary edema (HCC) ICD-10-CM: J81.0  ICD-9-CM: 518.4  5/20/2018 Unknown        Essential hypertension ICD-10-CM: I10  ICD-9-CM: 401.9  9/17/2015 Yes        COPD (chronic obstructive pulmonary disease) ([facility] ICD-10-CM: J44.9  ICD-9-CM: 496  9/1/2015 Yes    Overview Signed 9/11/2015  5:45 PM by Meño Scott NP     mild-mod dz; Dr Linda Trimble             Diabetes mellitus type 2, insulin dependent ([facility] ICD-10-CM: E11.9, Z79.4  ICD-9-CM: 250.00, V58.67  7/2/2015 Yes        Cardiomyopathy, dilated ([facility] ICD-10-CM: I42.0  ICD-9-CM: 425.4  12/31/2014 Yes            -Acute Anterolateral STEMI- s/p YADIRA to proximal LAD with RESOLUTE stent 5/20 pm.  -Acute stent thrombosis shortly after initial PCI requiring emergency intubation for pulmonary edema and repeat cardiac cath and additional PCI/YADIRA to prox LAD  -Acute pulmonary edema/respiratory failure secondary to above. Now resolved, patient self extubated this am.  CXR better  -Ischemic cardiomyopathy. EF 35% on echo post MI 5/21. Now better compensated, initially acute systolic heart failure due to cardiogenic shock in setting of AMI. -CARA. Likely due to acute MI/cardiogenic shock, now with good UO  -DM Type II  -Dyslipidemia- LDL> 200, supposed on atorva 80 mg daily, not sure is she was taking her medications  -HTN. On ACEI and CCB as outpatient. -COPD  -Tobacco abuse. 1 ppd X 50 years prior to admission    [facility] no [facility]    Assessment & Plan:     -LFTs elevated on atorva 80mg. Will decrease to 20mg daily.  Recommend recheck of LFTs in one week. Defer to [facility] PCP for labs. -Hemodynamics stable on BB. Continue. -Volume appears stable. Renal function improving. Would consider addition of ACEi to regimen.  -Continue on Brilinta and aspirin. 30 day free-fill script sent to our pharmacy to be filled. Pt will need refill script for Brilinta. -Stable for discharge from cardiac standpoint this afternoon. Please ensure follow up with [facility]. Subjective:     C/o epigastric pain and dry cough.     Objective:      Patient Vitals for the past 8 hrs:   Temp Pulse Resp BP SpO2   05/23/18 0900 - (!) 116 21 - -   05/23/18 0833 - - - - 99 %   05/23/18 0814 98.5 °F (36.9 °C) 96 21 141/45 -   05/23/18 0800 - 92 21 - -   05/23/18 0700 - 96 11 - -   05/23/18 0600 - (!) 102 20 - -   05/23/18 0508 - - - - 99 %   05/23/18 0500 - 84 19 - -   05/23/18 0400 98.4 °F (36.9 °C) 86 21 - 97 %         Patient Vitals for the past 96 hrs:   Weight   05/22/18 1626 96.6 kg (213 lb)   05/20/18 2214 98.9 kg (218 lb 0.6 oz)   05/20/18 1917 93.4 kg (206 lb)         Intake/Output Summary (Last 24 hours) at 05/23/18 1118  Last data filed at 05/23/18 0800   Gross per 24 hour   Intake                0 ml   Output             1800 ml   Net            -1800 ml       Physical Exam:  General:  Awake, alert, oriented x 3  Neck:  supple  Lungs:  Clear to auscultation bilat  Heart:  Reg rate and rhythm, no murmur  Abdomen: soft, non-tender  Extremities:  No LE edema, atraumatic, no cyanosis    Data Review:     Labs: Results:       Chemistry Recent Labs      05/23/18   0600  05/22/18   1800  05/22/18   1015   05/21/18   0623   05/20/18   1916   GLU  170*  189*  172*   < >  165*   < >  128*   NA  138  139  139   < >  138   < >  139   K  4.9  4.4  4.4   < >  5.2   < >  4.3   CL  107  104  104   < >  105   < >  109*   CO2  23  23  20*   < >  23   < >  22   BUN  51*  57*  50*   < >  32*   < >  28*   CREA  1.79*  2.34*  2.02*   < >  1.95*   < >  1.61*   CA  8.5  8.4*  8.4* < >  8.7   < >  9.1   MG  2.6  2.4  2.4   < >  2.7*   < >   --    PHOS  3.2  4.0  4.5   < >  4.7   < >   --    AGAP  8  12  15   < >  10   < >  8   BUCR  28*  24*  25*   < >  16   < >  17   AP  84   --    --    --   114   --   112   TP  6.7   --    --    --   7.3   --   7.3   ALB  3.3*   --    --    --   3.4   --   3.6   GLOB  3.4   --    --    --   3.9   --   3.7   AGRAT  1.0   --    --    --   0.9   --   1.0    < > = values in this interval not displayed. CBC w/Diff Recent Labs      05/23/18   0600  05/22/18   0600  05/21/18   1635   05/20/18   1916   WBC  11.3  13.1  13.6*   < >  4.8   RBC  3.63*  3.86*  4.23   < >  4.23   HGB  10.0*  10.5*  11.8*   < >  11.6*   HCT  31.5*  32.8*  36.9   < >  36.2   PLT  298  322  323   < >  301   GRANS  87*  87*   --    --   59   LYMPH  7*  9*   --    --   28   EOS  0  0   --    --   5    < > = values in this interval not displayed. Cardiac Enzymes No results found for: CPK, CK, CKMMB, CKMB, RCK3, CKMBT, CKNDX, CKND1, GODFREY, TROPT, TROIQ, SANG, TROPT, TNIPOC, BNP, BNPP   Coagulation No results for input(s): PTP, INR, APTT in the last 72 hours.     No lab exists for component: INREXT    Lipid Panel Lab Results   Component Value Date/Time    Cholesterol, total 291 (H) 05/21/2018 06:23 AM    HDL Cholesterol 52 05/21/2018 06:23 AM    LDL, calculated 212.6 (H) 05/21/2018 06:23 AM    VLDL, calculated 26.4 05/21/2018 06:23 AM    Triglyceride 132 05/21/2018 06:23 AM    CHOL/HDL Ratio 5.6 (H) 05/21/2018 06:23 AM      BNP No results found for: BNP, BNPP, XBNPT   Liver Enzymes Recent Labs      05/23/18   0600   TP  6.7   ALB  3.3*   AP  84   SGOT  197*      Digoxin    Thyroid Studies Lab Results   Component Value Date/Time    TSH 1.01 05/21/2018 06:23 AM            Signed By: LORE Cole     May 23, 2018

## 2018-05-23 NOTE — ROUTINE PROCESS
1100/ Received report from [facility]. Complete assessment done, No c/o pain. VSS.  patient resting in bed. Care assumed from [facility]  1300/ Pt up ad sarah without problems. VSS. Pt stating readiness for DC home  1600/ Order received to discharge home per Dr. Jah Suarez. Discharge instructions and teaching prepared. 1700/  at bedside. Prescription for 30 days of brillenta tablets given in addition to prescriptions, discharge instructions and appointments given and reviewed with patient and family member. VSS. Pt states readiness for discharge and understands dc instructions. Tele dc'd and PIV without difficulty. Pt sent home via private Car.

## 2018-05-23 NOTE — PROGRESS NOTES
[facility]  Pulmonary, Critical Care, and Sleep Medicine      Name: Pablito Duong MRN: [id number]   : 1950 Hospital: [facility]   Date: 2018          Pulm/Critical Care follow up        IMPRESSION:     · S/p Acute hypoxic respiratory failure- resolved  · STEMI, initially to anteroseptal EKG leads, s/p PCI and stenting of proximal LAD with 90-95% stenosis followed by EKG changes to anterolateral leads with emergent repeat PCI and stenting to ostial and mid LAD  · Acute on chronic kidney disease-improved  · Acute metabolic acidosis- resolved  · Hx COPD with mild exacerbation  · Hx DM, last hgb A1c () 6.6  · Hx HTN; dilated cardiomyopathy with prior EF 45-50% () with Gr 1 diastolic dysfunction; mild to moderate mitral valve regurgitation  · Hx cervical spondylosis, chronic pain syndrome      RECOMMENDATIONS:   · Resp - titrate FiO2 to keep SpO2 > 90%. Scheduled bronchodilator; wean off IV steroid. · Add incentive spirometry, airway clearance  · OOB ambulate  · Will follow up in clinic     Subjective/History:      18   Overnight did well. Hemodynamically stable. Improved oxygenation. On room air now  Awake, alert, coughing adequately. Denies chest pain  Denies nausea, vomiting      HPI: This admission  This patient has been seen and evaluated at the request of Dr. Alec Bhatti for ventilator management. Patient is a 74 y.o. female with hx of COPD, Hx DM, HTN, dilated cardiomyopathy with prior EF 45-50% () with Gr 1 diastolic dysfunction; mild to moderate mitral valve regurgitation; cervical spondylosis, chronic pain syndrome presented to the ED with chest pain with associated right arm pain per chart review; found to have STEMI for which she underwent emergent cath with findings of single vessel CAD to LAD s/p stenting of proximal LAD.  Pt was transferred to CVT ICU and shortly thereafter started to have wheezing and increasing tachypnea and noted to have elevated BP. Pt was given lasix 80 mg IV total, morphine 1 mg IV, labetalol 20 mg IV and breathing treatment per hospitalist. Pt noted to have gone into bradycardia with HR around 48 and then pt became unresponsive. Code blue was called however pt had a pulse the entire time per RN bedside. Pt was intubated by anesthesia for unresponsiveness. Call then was made at around [id number] to [facility] for vent management. Serial EKG done for rhythm changes on telemetry monitoring prompting a repeat STEMI alert. Pt underwent repeat cardiac catheterization with stenting to the ostial and mid-LAD. Pt was transferred to ICU on ventilator and sedated on propofol infusion. Since insertion of soto catheter prior to 2nd cath lab, pt had produced over a liter of dilute urine      Past Medical History:   Diagnosis Date    Abnormal WBC count 5/17/2016    Bilateral shoulder pain 9/27/2016    Chondromalacia of both patellae     Chronic lung disease     Chronic obstructive pulmonary disease ([facility] 5/17/2016    COPD (chronic obstructive pulmonary disease) ([facility] 9/2015    mild-mod dz; Dr Kostas Painter    Diabetes [facility]) 2013    Diabetes mellitus ([facility]     Diabetic eye exam ([facility] 2016    Dilated cardiomyopathy ([facility]     Dyslipidemia     History of echocardiogram 11/14/2014    Mild LVE. EF 40%. Mild, diffuse hypk. Mild LAE. Mild MR.      Hypercholesteremia 1/08/14    Hypertension 2000    Noncompliance with medications 2/16/2016    Obesity     Orthostatic hypotension 5/17/2016    Osteoarthritis of both knees     Pain management 04/01/2016    Dr. July Garcia     Popliteal cyst, bilateral      Vitamin D deficiency 10/16/14      History reviewed. No pertinent surgical history.      Current Facility-Administered Medications   Medication Dose Route Frequency    atorvastatin (LIPITOR) tablet 20 mg  20 mg Oral QHS    methylPREDNISolone (PF) (SOLU-MEDROL) injection 40 mg  40 mg IntraVENous Q12H    insulin glargine (LANTUS) injection 15 Units  15 Units SubCUTAneous DAILY    carvedilol (COREG) tablet 6.25 mg  6.25 mg Oral BID WITH MEALS    insulin lispro (HUMALOG) injection   SubCUTAneous AC&HS    albuterol-ipratropium (DUO-NEB) 2.5 MG-0.5 MG/3 ML  3 mL Nebulization Q4H RT    famotidine (PF) (PEPCID) 20 mg in sodium chloride 0.9% 10 mL injection  20 mg IntraVENous DAILY    ferrous sulfate tablet 325 mg  325 mg Oral BID    nicotine (NICODERM CQ) 14 mg/24 hr patch 1 Patch  1 Patch TransDERmal Q24H    aspirin chewable tablet 81 mg  81 mg Oral DAILY    sodium chloride (NS) flush 5-10 mL  5-10 mL IntraVENous Q8H    ticagrelor (BRILINTA) tablet 90 mg  90 mg Oral BID     No Known Allergies      Review of Systems:  Review of systems not obtained due to patient factors. Objective:   Vital Signs:    Visit Vitals    /45 (BP 1 Location: Right arm, BP Patient Position: Head of bed elevated (Comment degrees))    Pulse (!) 116    Temp 98.5 °F (36.9 °C)    Resp 21    Ht 5' 4\" (1.626 m)    Wt 96.6 kg (213 lb)    SpO2 99%    BMI 36.56 kg/m2       O2 Device: Room air   O2 Flow Rate (L/min): 2 l/min   Temp (24hrs), Av.4 °F (36.9 °C), Min:98.2 °F (36.8 °C), Max:98.5 °F (36.9 °C)       Intake/Output:   Last shift:      701 - 1900  In: -   Out: 500 [Urine:500]  Last 3 shifts: 1901 -  07  In: 438 [I.V.:318]  Out: 2261 [Urine:2011]    Intake/Output Summary (Last 24 hours) at 18 1145  Last data filed at 18 0800   Gross per 24 hour   Intake                0 ml   Output             1800 ml   Net            -1800 ml       Physical Exam:    General:  Awake. Alert, comfortable   Head:  Normocephalic, without obvious abnormality, atraumatic.    Eyes:  Pink palpebral conjunctivae, anicteric sclerae; + pupils briskly reactive to light b/l    Nose: Nares normal. No drainage    Throat: Intubated    Neck: Supple, symmetrical, trachea midline, no adenopathy, thyroid: no enlargment/tenderness/nodules, no carotid bruit and no JVD. Lungs:   Clear to ausculation   Chest wall:  No deformity. Heart:  Regular rate and rhythm, S1, S2 present   Abdomen:   Soft, Bowel sounds normal.    Extremities: Extremities atraumatic, no cyanosis    Pulses: 2+ and symmetric all extremities. Skin: Skin color, texture, turgor normal. No rashes or lesions. No hematoma surrounding groin lines   Lymph nodes: Cervical, supraclavicular nodes normal.   Neurologic: No focal findings       Data:     Recent Results (from the past 24 hour(s))   EKG, 12 LEAD, INITIAL    Collection Time: 05/22/18  1:40 PM   Result Value Ref Range    Ventricular Rate 124 BPM    Atrial Rate 124 BPM    P-R Interval 166 ms    QRS Duration 76 ms    Q-T Interval 312 ms    QTC Calculation (Bezet) 448 ms    Calculated P Axis 72 degrees    Calculated R Axis 50 degrees    Calculated T Axis 123 degrees    Diagnosis       Sinus tachycardia  Lateral infarct (cited on or before 20-MAY-2018)  Abnormal ECG  When compared with ECG of 21-MAY-2018 05:37,  Vent.  rate has increased BY  42 BPM  Serial changes of evolving Lateral infarct present  Confirmed by Paula Sr MD, Leanora Proper ([id number]) on 5/22/2018 4:25:19 PM     GLUCOSE, POC    Collection Time: 05/22/18  5:00 PM   Result Value Ref Range    Glucose (POC) 176 (H) 70 - 110 mg/dL   MAGNESIUM    Collection Time: 05/22/18  6:00 PM   Result Value Ref Range    Magnesium 2.4 1.6 - 2.6 mg/dL   PHOSPHORUS    Collection Time: 05/22/18  6:00 PM   Result Value Ref Range    Phosphorus 4.0 2.5 - 4.9 MG/DL   METABOLIC PANEL, BASIC    Collection Time: 05/22/18  6:00 PM   Result Value Ref Range    Sodium 139 136 - 145 mmol/L    Potassium 4.4 3.5 - 5.5 mmol/L    Chloride 104 100 - 108 mmol/L    CO2 23 21 - 32 mmol/L    Anion gap 12 3.0 - 18 mmol/L    Glucose 189 (H) 74 - 99 mg/dL    BUN 57 (H) 7.0 - 18 MG/DL    Creatinine 2.34 (H) 0.6 - 1.3 MG/DL    BUN/Creatinine ratio 24 (H) 12 - 20      GFR est AA 25 (L) >60 ml/min/1.73m2    GFR est non-AA 21 (L) >60 ml/min/1.73m2 Calcium 8.4 (L) 8.5 - 10.1 MG/DL   GLUCOSE, POC    Collection Time: 05/22/18 10:48 PM   Result Value Ref Range    Glucose (POC) 134 (H) 70 - 110 mg/dL   CBC WITH AUTOMATED DIFF    Collection Time: 05/23/18  6:00 AM   Result Value Ref Range    WBC 11.3 4.6 - 13.2 K/uL    RBC 3.63 (L) 4.20 - 5.30 M/uL    HGB 10.0 (L) 12.0 - 16.0 g/dL    HCT 31.5 (L) 35.0 - 45.0 %    MCV 86.8 74.0 - 97.0 FL    MCH 27.5 24.0 - 34.0 PG    MCHC 31.7 31.0 - 37.0 g/dL    RDW 16.9 (H) 11.6 - 14.5 %    PLATELET 001 167 - 953 K/uL    MPV 9.8 9.2 - 11.8 FL    NEUTROPHILS 87 (H) 40 - 73 %    LYMPHOCYTES 7 (L) 21 - 52 %    MONOCYTES 6 3 - 10 %    EOSINOPHILS 0 0 - 5 %    BASOPHILS 0 0 - 2 %    ABS. NEUTROPHILS 9.8 (H) 1.8 - 8.0 K/UL    ABS. LYMPHOCYTES 0.8 (L) 0.9 - 3.6 K/UL    ABS. MONOCYTES 0.7 0.05 - 1.2 K/UL    ABS. EOSINOPHILS 0.0 0.0 - 0.4 K/UL    ABS. BASOPHILS 0.0 0.0 - 0.06 K/UL    DF AUTOMATED     METABOLIC PANEL, COMPREHENSIVE    Collection Time: 05/23/18  6:00 AM   Result Value Ref Range    Sodium 138 136 - 145 mmol/L    Potassium 4.9 3.5 - 5.5 mmol/L    Chloride 107 100 - 108 mmol/L    CO2 23 21 - 32 mmol/L    Anion gap 8 3.0 - 18 mmol/L    Glucose 170 (H) 74 - 99 mg/dL    BUN 51 (H) 7.0 - 18 MG/DL    Creatinine 1.79 (H) 0.6 - 1.3 MG/DL    BUN/Creatinine ratio 28 (H) 12 - 20      GFR est AA 34 (L) >60 ml/min/1.73m2    GFR est non-AA 28 (L) >60 ml/min/1.73m2    Calcium 8.5 8.5 - 10.1 MG/DL    Bilirubin, total 0.3 0.2 - 1.0 MG/DL    ALT (SGPT) 58 (H) 13 - 56 U/L    AST (SGOT) 197 (H) 15 - 37 U/L    Alk.  phosphatase 84 45 - 117 U/L    Protein, total 6.7 6.4 - 8.2 g/dL    Albumin 3.3 (L) 3.4 - 5.0 g/dL    Globulin 3.4 2.0 - 4.0 g/dL    A-G Ratio 1.0 0.8 - 1.7     MAGNESIUM    Collection Time: 05/23/18  6:00 AM   Result Value Ref Range    Magnesium 2.6 1.6 - 2.6 mg/dL   PHOSPHORUS    Collection Time: 05/23/18  6:00 AM   Result Value Ref Range    Phosphorus 3.2 2.5 - 4.9 MG/DL             Telemetry:normal sinus rhythm    Imaging:  CXR Results  (Last 48 hours)               05/22/18 0600  XR CHEST PORT Final result    Impression:  IMPRESSION:       Persistent but improved bilateral interstitial and airspace opacities suggesting   improving edema. Narrative:  PORTABLE CHEST RADIOGRAPH        CPT CODE: 03503        INDICATION: Acute respiratory failure, ET tube placement. COMPARISON: 5/21/2018. FINDINGS:       ET tube is present with tip approximately 2.8 cm above the ramos. The   cardiomediastinal silhouette is mildly enlarged, unchanged. Calcifications   present in the thoracic aorta. Persistent increased interstitial opacities are   noted bilaterally, overall improved compared to the prior exam. Right   perihilar/infrahilar airspace opacity has mildly improved. Blunting of the left   costophrenic angle potentially related to a small effusion. No overt   pneumothorax. Osseous structures unchanged.                  CT Results  (Last 48 hours)    None                   This care involved high complexity decision making to assess, manipulate, and support vital system functions, to treat this degreee vital organ system failure and to prevent further life threatening deterioration of the patients condition      Adarsh Oconnor MD

## 2018-05-29 LAB — ACT BLD: 158 SECS (ref 79–138)

## 2018-05-30 ENCOUNTER — TELEPHONE (OUTPATIENT)
Dept: CARDIAC REHAB | Age: 68
End: 2018-05-30

## 2018-05-30 NOTE — TELEPHONE ENCOUNTER
[facility] called patient and left a message about the program. Additional attempts at contact will be made.      Thank you,  Sowmya Xiong

## 2018-05-31 ENCOUNTER — OFFICE VISIT (OUTPATIENT)
Dept: PULMONOLOGY | Age: 68
End: 2018-05-31

## 2018-05-31 VITALS
RESPIRATION RATE: 20 BRPM | HEIGHT: 64 IN | WEIGHT: 212 LBS | DIASTOLIC BLOOD PRESSURE: 67 MMHG | HEART RATE: 88 BPM | SYSTOLIC BLOOD PRESSURE: 120 MMHG | OXYGEN SATURATION: 98 % | TEMPERATURE: 98 F | BODY MASS INDEX: 36.19 KG/M2

## 2018-05-31 DIAGNOSIS — J43.9 PULMONARY EMPHYSEMA, UNSPECIFIED EMPHYSEMA TYPE (HCC): ICD-10-CM

## 2018-05-31 DIAGNOSIS — I42.0 CARDIOMYOPATHY, DILATED (HCC): ICD-10-CM

## 2018-05-31 DIAGNOSIS — Z79.4 DIABETES MELLITUS TYPE 2, INSULIN DEPENDENT (HCC): ICD-10-CM

## 2018-05-31 DIAGNOSIS — E11.9 DIABETES MELLITUS TYPE 2, INSULIN DEPENDENT (HCC): ICD-10-CM

## 2018-05-31 DIAGNOSIS — J96.01 ACUTE RESPIRATORY FAILURE WITH HYPOXIA (HCC): Primary | ICD-10-CM

## 2018-05-31 PROBLEM — I25.10 CORONARY ARTERY DISEASE INVOLVING NATIVE CORONARY ARTERY OF NATIVE HEART WITHOUT ANGINA PECTORIS: Status: ACTIVE | Noted: 2018-05-31

## 2018-05-31 NOTE — MR AVS SNAPSHOT
[address] [facility], [address] [facility] [address] 
[phone] Patient: Wesley Martinez MRN: [id number] :1950 Visit Information Date & Time Provider Department Dept. Phone Encounter #  
 2018 12:00 PM MD [facility] Nazario Abraham [id number] Follow-up Instructions Return in about 2 months (around 2018). Upcoming Health Maintenance Date Due  
 EYE EXAM RETINAL OR DILATED Q1 2017 MICROALBUMIN Q1 2017 FOOT EXAM Q1 2018 FOBT Q 1 YEAR AGE 50-75 2018 MEDICARE YEARLY EXAM 3/20/2018 GLAUCOMA SCREENING Q2Y 2018 Pneumococcal 65+ Low/Medium Risk (1 of 2 - PCV13) 2018* Influenza Age 5 to Adult 2018 HEMOGLOBIN A1C Q6M 2018 LIPID PANEL Q1 2019 BREAST CANCER SCRN MAMMOGRAM 2019 DTaP/Tdap/Td series (2 - Td) 2024 *Topic was postponed. The date shown is not the original due date. Allergies as of 2018  Review Complete On: 2018 By: Ilya Recinos MD  
 No Known Allergies Current Immunizations  Reviewed on 2018 Name Date Influenza Vaccine Jeremiah Cagey) 10/22/2015 Influenza Vaccine (Quad) PF 10/19/2016 Influenza Vaccine (Trivalent) 10/16/2014 Influenza Vaccine PF 2014 Pneumococcal Polysaccharide (PPSV-23) 2015  1:01 PM  
 Rabies Immune Globulin 2014  1:49 PM  
 Rabies Vaccine IM 7/10/2014 10:59 AM, 7/3/2014 10:55 AM, 2014  9:28 AM, 2014  1:46 PM  
 Tdap 2014  1:06 PM  
  
 Reviewed by Ilya Recinos MD on 2018 at 12:41 PM  
You Were Diagnosed With   
  
 Codes Comments Acute respiratory failure with hypoxia (HCC)    -  Primary ICD-10-CM: J96.01 
ICD-9-CM: 518.81 Vitals BP Pulse Temp Resp Height(growth percentile) Weight(growth percentile)  120/67 (BP 1 Location: Right arm, BP Patient Position: Sitting) 88 98 °F (36.7 °C) (Oral) 20 5' 4\" (1.626 m) 212 lb (96.2 kg) SpO2 BMI OB Status Smoking Status 98% 36.39 kg/m2 Menopause Former Smoker Vitals History BMI and BSA Data Body Mass Index Body Surface Area  
 36.39 kg/m 2 2.08 m 2 Preferred Pharmacy Pharmacy Name Phone [address] [place] [address]. [phone] Your Updated Medication List  
  
   
This list is accurate as of 5/31/18  1:01 PM.  Always use your most recent med list.  
  
  
  
  
 * albuterol 2.5 mg /3 mL (0.083 %) nebulizer solution Commonly known as:  PROVENTIL VENTOLIN  
3 mL by Nebulization route every four (4) hours as needed for Wheezing. * albuterol 90 mcg/actuation inhaler Commonly known as:  PROVENTIL HFA, VENTOLIN HFA, PROAIR HFA Take 2 Puffs by inhalation every four (4) hours as needed for Wheezing. aspirin 81 mg chewable tablet Take 1 Tab by mouth daily. atorvastatin 20 mg tablet Commonly known as:  LIPITOR Take 1 Tab by mouth nightly. carvedilol 6.25 mg tablet Commonly known as:  Daniel Reid Take 1 Tab by mouth two (2) times daily (with meals). desloratadine 5 mg tablet Commonly known as:  CLARINEX Take 1 Tab by mouth daily as needed for Allergies. esomeprazole 40 mg capsule Commonly known as:  NexIUM Take 1 Cap by mouth daily as needed. For reflux  
  
 ferrous sulfate 325 mg (65 mg iron) tablet Take 1 Tab by mouth two (2) times a day. Indications: IRON DEFICIENCY ANEMIA  
  
 insulin detemir U-100 100 unit/mL injection Commonly known as:  LEVEMIR U-100 INSULIN  
15 units daily for diabetes  
  
 magnesium oxide 400 mg tablet Commonly known as:  MAG-OX Take 1 Tab by mouth daily. For low magnesium  
  
 mometasone 50 mcg/actuation nasal spray Commonly known as:  NASONEX  
2 Sprays by Both Nostrils route daily as needed. For allergies  
  
 omega-3 acid ethyl esters 1 gram capsule Commonly known as:  Vermell Stammer Take 2 Caps by mouth daily (with breakfast). oxyCODONE-acetaminophen 5-325 mg per tablet Commonly known as:  PERCOCET Take 1 Tab by mouth every six (6) hours as needed. Max Daily Amount: 4 Tabs. ticagrelor 90 mg tablet Commonly known as:  [employer] Take 1 Tab by mouth two (2) times a day. umeclidinium-vilanterol 62.5-25 mcg/actuation inhaler Commonly known as:  Almeta Staple Take 1 Puff by inhalation daily. For COPD * Notice: This list has 2 medication(s) that are the same as other medications prescribed for you. Read the directions carefully, and ask your doctor or other care provider to review them with you. Follow-up Instructions Return in about 2 months (around 7/31/2018). Introducing hospitals & [facility] SERVICES! Cyndi Winston introduces [employer] patient portal. Now you can access parts of your medical record, email your doctor's office, and request medication refills online. 1. In your internet browser, go to [email or web address] 2. Click on the First Time User? Click Here link in the Sign In box. You will see the New Member Sign Up page. 3. Enter your [employer] Access Code exactly as it appears below. You will not need to use this code after youve completed the sign-up process. If you do not sign up before the expiration date, you must request a new code. · [employer] Access Code: [email or web address] Expires: 8/18/2018  7:44 PM 
 
4. Enter the last four digits of your Social Security Number (xxxx) and Date of Birth (mm/dd/yyyy) as indicated and click Submit. You will be taken to the next sign-up page. 5. Create a [employer] ID. This will be your [employer] login ID and cannot be changed, so think of one that is secure and easy to remember. 6. Create a [employer] password. You can change your password at any time. 7. Enter your Password Reset Question and Answer.  This can be used at a later time if you forget your password. 8. Enter your e-mail address. You will receive e-mail notification when new information is available in [address] 9. Click Sign Up. You can now view and download portions of your medical record. 10. Click the Download Summary menu link to download a portable copy of your medical information. If you have questions, please visit the Frequently Asked Questions section of the [employer] website. Remember, [employer] is NOT to be used for urgent needs. For medical emergencies, dial 911. Now available from your iPhone and Android! Please provide this summary of care documentation to your next provider. Your primary care clinician is listed as Blake Georges. If you have any questions after today's visit, please call [phone].

## 2018-05-31 NOTE — PROGRESS NOTES
COPD visit:  Reason:recent hospitalization 5/21- 5/23/2018                MARCO ANTONIO: 7-14  days                           Last Hospital discharge: 5/23/2018    HPI:  05/31/18  Feels better. Denies chest.pain  Symptoms reviewed- Cough- dry- minimal  Has c/o SOB with activity, not at rest, no Nocturnal worsening,   Activities of daily living not affected        Denies pedal edema. Reports Medication compliance- inhalers,brillanta, asa, antihypertensives  Ms. Katy Frankel had no medications administered during this visit. Diet compliance    Physical Exam   General:  Awake. Alert, comfortable   Head:  Normocephalic, without obvious abnormality, atraumatic. Eyes:  Pink palpebral conjunctivae, anicteric sclerae; + pupils briskly reactive to light b/l    Nose: Nares normal. No drainage    Throat: Intubated    Neck: Supple, symmetrical, trachea midline, no adenopathy, thyroid: no enlargment/tenderness/nodules, no carotid bruit and no JVD. Lungs:   Clear to ausculation   Chest wall:  No deformity. Heart:  Regular rate and rhythm, S1, S2 present   Abdomen:   Soft, Bowel sounds normal.    Extremities: Extremities atraumatic, no cyanosis    Pulses: 2+ and symmetric all extremities. Skin: Skin color, texture, turgor normal. No rashes or lesions. No hematoma surrounding groin lines   Lymph nodes: Cervical, supraclavicular nodes normal.   Neurologic: No focal findings     Diagnostics; Spirometry date: 7/2017 FEV1-68% predicted  Pulmonary Function Test  FLOWS:  Forced Expiratory Volume in one second is reduced to 68 % predicted  FEV 1% is  normal  Volumes:   All Volumes are reduced  Flow Volume Loop:  Poor Flow Volume Loop tracing due to patient performance  Bronchodilator:  NA  Diffusion:  Abnormal Diffusion Capacity reduced to 50 % predicted  Impression:  Mild restrictive ventilatory defect, Reduced diffusion capacity indicating a decrease in alveolar surface area for gas exchange    Impression:  · S/p Acute hypoxic respiratory failure- resolved. Predominantly due to acute MI/Pulm edema  · STEMI, initially to anteroseptal EKG leads, s/p PCI and stenting of proximal LAD with 90-95% stenosis followed by EKG changes to anterolateral leads with emergent repeat PCI and stenting to ostial and mid LAD  · Acute on chronic kidney disease-improved  · Hx COPD with mild exacerbation- resolved. Baseline PFT with restrictive impairment and DLCO reduced to 50%  · Hx DM, last hgb A1c (2017) 6.6  · Hx HTN; dilated cardiomyopathy with prior EF 45-50% (2015) with Gr 1 diastolic dysfunction; mild to moderate mitral valve regurgitation  · Hx cervical spondylosis, chronic pain syndrome      Care Plan:  Treatment:   Based on GOLD stage 2,   CAT score -  >10  Patient Group:, B,   COPD Pharmacotherapy -  No indication for supplemental oxygen  Need for Non invasive ventilatory support- not indicated  Action Plan: discussed    Education:  Inhaler techniques, MDI/spacers, nebulizers use  goal setting,monitoring,   Reducing anxiety, energy conservation tips, exercise, medications and Nutrition    Pulm Rehab-start with cardiac rehab - recent AMI    Vaccinations- Influenza- Pneumovax- will advocate series PPSV and Prevnar-13 > 65 years. Smoking cessation  The patient was counseled on the dangers of tobacco use, and was advised to quit. Reviewed strategies to maximize success, including removing cigarettes and smoking materials from environment, stress management and written materials. Associated Comorbidity:  Heart disease  Diabetes  Sleep apnea  Osteoporosis risk    Lung cancer screening criteria-met.  Will order per guidelines      Salvador Wyatt MD

## 2018-05-31 NOTE — LETTER
5/31/2018 1:05 PM 
 
Patient:  Tesfaye Gunter YOB: 1950 Date of Visit: 5/31/2018 Dear Roula Ureña MD 
[address] [place] [address] VIA Facsimile: [phone] 
 : Thank you for referring Ms. Tesfaye Gunter to me for evaluation/treatment. Below are the relevant portions of my assessment and plan of care. COPD visit: 
Reason:recent hospitalization 5/21- 5/23/2018 MARCO ANTONIO: 7-14  days Last Hospital discharge: 5/23/2018 HPI: 
05/31/18 Feels better. Denies chest.pain Symptoms reviewed- Cough- dry- minimal 
Has c/o SOB with activity, not at rest, no Nocturnal worsening, Activities of daily living not affected Denies pedal edema. Reports Medication compliance- inhalers,brillanta, asa, antihypertensives Ms. Edi Bill had no medications administered during this visit. Diet compliance Physical Exam  
General:  Awake. Alert, comfortable Head:  Normocephalic, without obvious abnormality, atraumatic. Eyes:  Pink palpebral conjunctivae, anicteric sclerae; + pupils briskly reactive to light b/l Nose: Nares normal. No drainage Throat: Intubated Neck: Supple, symmetrical, trachea midline, no adenopathy, thyroid: no enlargment/tenderness/nodules, no carotid bruit and no JVD. Lungs:   Clear to ausculation Chest wall:  No deformity. Heart:  Regular rate and rhythm, S1, S2 present Abdomen:   Soft, Bowel sounds normal.   
Extremities: Extremities atraumatic, no cyanosis Pulses: 2+ and symmetric all extremities. Skin: Skin color, texture, turgor normal. No rashes or lesions. No hematoma surrounding groin lines Lymph nodes: Cervical, supraclavicular nodes normal.  
Neurologic: No focal findings Diagnostics; Spirometry date: 7/2017 FEV1-68% predicted Pulmonary Function Test 
FLOWS: 
Forced Expiratory Volume in one second is reduced to 68 % predicted FEV 1% is  normal 
Volumes: All Volumes are reduced Flow Volume Loop: 
Poor Flow Volume Loop tracing due to patient performance Bronchodilator: 
NA Diffusion: Abnormal Diffusion Capacity reduced to 50 % predicted Impression: 
Mild restrictive ventilatory defect, Reduced diffusion capacity indicating a decrease in alveolar surface area for gas exchange Impression: · S/p Acute hypoxic respiratory failure- resolved. Predominantly due to acute MI/Pulm edema · STEMI, initially to anteroseptal EKG leads, s/p PCI and stenting of proximal LAD with 90-95% stenosis followed by EKG changes to anterolateral leads with emergent repeat PCI and stenting to ostial and mid LAD · Acute on chronic kidney disease-improved · Hx COPD with mild exacerbation- resolved. Baseline PFT with restrictive impairment and DLCO reduced to 50% · Hx DM, last hgb A1c (2017) 6.6 · Hx HTN; dilated cardiomyopathy with prior EF 45-50% (2015) with Gr 1 diastolic dysfunction; mild to moderate mitral valve regurgitation · Hx cervical spondylosis, chronic pain syndrome Care Plan: 
Treatment:  
Based on GOLD stage 2,  
CAT score -  >10 Patient Group:, B,  
COPD Pharmacotherapy - No indication for supplemental oxygen Need for Non invasive ventilatory support- not indicated Action Plan: discussed Education: 
Inhaler techniques, MDI/spacers, nebulizers use 
goal setting,monitoring, Reducing anxiety, energy conservation tips, exercise, medications and Nutrition Pulm Rehab-start with cardiac rehab - recent AMI Vaccinations- Influenza- Pneumovax- will advocate series PPSV and Prevnar-13 > 65 years. Smoking cessation The patient was counseled on the dangers of tobacco use, and was advised to quit. Reviewed strategies to maximize success, including removing cigarettes and smoking materials from environment, stress management and written materials. Associated Comorbidity: 
Heart disease Diabetes Sleep apnea Osteoporosis risk Lung cancer screening criteria-met. Will order per guidelines Macarena Crawford MD 
 
 
 
If you have questions, please do not hesitate to call me. I look forward to following Ms. Alba Wiggins along with you.  
 
 
 
Sincerely, 
 
 
Macarena Crawford MD

## 2018-05-31 NOTE — PROGRESS NOTES
Patient walked in hallway for over 330 ft with lowest sat 96% and HR of 110. Patient asked to stop due to leg pain.

## 2018-05-31 NOTE — COMMUNICATION BODY
COPD visit:  Reason:recent hospitalization 5/21- 5/23/2018                MARCO ANTONIO: 7-14  days                           Last Hospital discharge: 5/23/2018    HPI:  05/31/18  Feels better. Denies chest.pain  Symptoms reviewed- Cough- dry- minimal  Has c/o SOB with activity, not at rest, no Nocturnal worsening,   Activities of daily living not affected        Denies pedal edema. Reports Medication compliance- inhalers,brillanta, asa, antihypertensives  Ms. Sonia Trejo had no medications administered during this visit. Diet compliance    Physical Exam   General:  Awake. Alert, comfortable   Head:  Normocephalic, without obvious abnormality, atraumatic. Eyes:  Pink palpebral conjunctivae, anicteric sclerae; + pupils briskly reactive to light b/l    Nose: Nares normal. No drainage    Throat: Intubated    Neck: Supple, symmetrical, trachea midline, no adenopathy, thyroid: no enlargment/tenderness/nodules, no carotid bruit and no JVD. Lungs:   Clear to ausculation   Chest wall:  No deformity. Heart:  Regular rate and rhythm, S1, S2 present   Abdomen:   Soft, Bowel sounds normal.    Extremities: Extremities atraumatic, no cyanosis    Pulses: 2+ and symmetric all extremities. Skin: Skin color, texture, turgor normal. No rashes or lesions. No hematoma surrounding groin lines   Lymph nodes: Cervical, supraclavicular nodes normal.   Neurologic: No focal findings     Diagnostics; Spirometry date: 7/2017 FEV1-68% predicted  Pulmonary Function Test  FLOWS:  Forced Expiratory Volume in one second is reduced to 68 % predicted  FEV 1% is  normal  Volumes:   All Volumes are reduced  Flow Volume Loop:  Poor Flow Volume Loop tracing due to patient performance  Bronchodilator:  NA  Diffusion:  Abnormal Diffusion Capacity reduced to 50 % predicted  Impression:  Mild restrictive ventilatory defect, Reduced diffusion capacity indicating a decrease in alveolar surface area for gas exchange    Impression:  · S/p Acute hypoxic respiratory failure- resolved. Predominantly due to acute MI/Pulm edema  · STEMI, initially to anteroseptal EKG leads, s/p PCI and stenting of proximal LAD with 90-95% stenosis followed by EKG changes to anterolateral leads with emergent repeat PCI and stenting to ostial and mid LAD  · Acute on chronic kidney disease-improved  · Hx COPD with mild exacerbation- resolved. Baseline PFT with restrictive impairment and DLCO reduced to 50%  · Hx DM, last hgb A1c (2017) 6.6  · Hx HTN; dilated cardiomyopathy with prior EF 45-50% (2015) with Gr 1 diastolic dysfunction; mild to moderate mitral valve regurgitation  · Hx cervical spondylosis, chronic pain syndrome      Care Plan:  Treatment:   Based on GOLD stage 2,   CAT score -  >10  Patient Group:, B,   COPD Pharmacotherapy -  No indication for supplemental oxygen  Need for Non invasive ventilatory support- not indicated  Action Plan: discussed    Education:  Inhaler techniques, MDI/spacers, nebulizers use  goal setting,monitoring,   Reducing anxiety, energy conservation tips, exercise, medications and Nutrition    Pulm Rehab-start with cardiac rehab - recent AMI    Vaccinations- Influenza- Pneumovax- will advocate series PPSV and Prevnar-13 > 65 years. Smoking cessation  The patient was counseled on the dangers of tobacco use, and was advised to quit. Reviewed strategies to maximize success, including removing cigarettes and smoking materials from environment, stress management and written materials. Associated Comorbidity:  Heart disease  Diabetes  Sleep apnea  Osteoporosis risk    Lung cancer screening criteria-met.  Will order per guidelines      Betty Villalpando MD

## 2018-06-01 LAB
AMPHET UR QL SCN: NEGATIVE
BARBITURATES UR QL SCN: NEGATIVE
BENZODIAZ UR QL: NEGATIVE
BENZODIAZ UR QL: POSITIVE
CANNABINOIDS UR QL SCN: NEGATIVE
COCAINE UR QL SCN: NEGATIVE
CODEINE UR QL: NEGATIVE
HDSCOM,HDSCOM: ABNORMAL
HYDROCODONE UR QL: NEGATIVE
HYDROMORPHONE UR QL: NEGATIVE
METHADONE UR QL: NEGATIVE
MORPHINE UR CFM-MCNC: 541 NG/ML
MORPHINE UR QL: POSITIVE
OPIATES UR QL: POSITIVE
OPIATES UR QL: POSITIVE NG/ML
PCP UR QL: NEGATIVE

## 2018-06-06 ENCOUNTER — TELEPHONE (OUTPATIENT)
Dept: CARDIAC REHAB | Age: 68
End: 2018-06-06

## 2018-06-06 NOTE — TELEPHONE ENCOUNTER
Cardiac Rehab called patient and spoke to her about the program. She was not interested and declined the program.    Thank you,  Na Jerome

## 2018-06-08 ENCOUNTER — HOSPITAL ENCOUNTER (INPATIENT)
Age: 68
LOS: 3 days | Discharge: HOME | DRG: 282 | End: 2018-06-11
Attending: EMERGENCY MEDICINE | Admitting: INTERNAL MEDICINE
Payer: MEDICARE

## 2018-06-08 ENCOUNTER — APPOINTMENT (OUTPATIENT)
Dept: GENERAL RADIOLOGY | Age: 68
DRG: 282 | End: 2018-06-08
Attending: PHYSICIAN ASSISTANT
Payer: MEDICARE

## 2018-06-08 ENCOUNTER — APPOINTMENT (OUTPATIENT)
Dept: CT IMAGING | Age: 68
DRG: 282 | End: 2018-06-08
Attending: EMERGENCY MEDICINE
Payer: MEDICARE

## 2018-06-08 DIAGNOSIS — J81.0 ACUTE PULMONARY EDEMA (HCC): Primary | ICD-10-CM

## 2018-06-08 PROBLEM — J81.1 PULMONARY EDEMA: Status: ACTIVE | Noted: 2018-06-08

## 2018-06-08 LAB
ALBUMIN SERPL-MCNC: 3.2 G/DL (ref 3.4–5)
ALBUMIN/GLOB SERPL: 0.9 {RATIO} (ref 0.8–1.7)
ALP SERPL-CCNC: 110 U/L (ref 45–117)
ALT SERPL-CCNC: 28 U/L (ref 13–56)
ANION GAP SERPL CALC-SCNC: 8 MMOL/L (ref 3–18)
AST SERPL-CCNC: 12 U/L (ref 15–37)
BASOPHILS # BLD: 0 K/UL (ref 0–0.06)
BASOPHILS NFR BLD: 0 % (ref 0–3)
BILIRUB SERPL-MCNC: 0.5 MG/DL (ref 0.2–1)
BNP SERPL-MCNC: ABNORMAL PG/ML (ref 0–900)
BUN SERPL-MCNC: 33 MG/DL (ref 7–18)
BUN/CREAT SERPL: 21 (ref 12–20)
CALCIUM SERPL-MCNC: 8.5 MG/DL (ref 8.5–10.1)
CHLORIDE SERPL-SCNC: 109 MMOL/L (ref 100–108)
CK MB CFR SERPL CALC: 0.9 % (ref 0–4)
CK MB SERPL-MCNC: 1.5 NG/ML (ref 5–25)
CK SERPL-CCNC: 165 U/L (ref 26–192)
CO2 SERPL-SCNC: 23 MMOL/L (ref 21–32)
CREAT SERPL-MCNC: 1.6 MG/DL (ref 0.6–1.3)
DIFFERENTIAL METHOD BLD: ABNORMAL
EOSINOPHIL # BLD: 0 K/UL (ref 0–0.4)
EOSINOPHIL NFR BLD: 0 % (ref 0–5)
ERYTHROCYTE [DISTWIDTH] IN BLOOD BY AUTOMATED COUNT: 16.2 % (ref 11.6–14.5)
GLOBULIN SER CALC-MCNC: 3.5 G/DL (ref 2–4)
GLUCOSE SERPL-MCNC: 109 MG/DL (ref 74–99)
HCT VFR BLD AUTO: 34.4 % (ref 35–45)
HGB BLD-MCNC: 10.9 G/DL (ref 12–16)
LYMPHOCYTES # BLD: 2.9 K/UL (ref 0.8–3.5)
LYMPHOCYTES NFR BLD: 27 % (ref 20–51)
MAGNESIUM SERPL-MCNC: 2 MG/DL (ref 1.6–2.6)
MCH RBC QN AUTO: 26.8 PG (ref 24–34)
MCHC RBC AUTO-ENTMCNC: 31.7 G/DL (ref 31–37)
MCV RBC AUTO: 84.7 FL (ref 74–97)
MONOCYTES # BLD: 1.1 K/UL (ref 0–1)
MONOCYTES NFR BLD: 10 % (ref 2–9)
NEUTS BAND NFR BLD MANUAL: 2 % (ref 0–5)
NEUTS SEG # BLD: 6.8 K/UL (ref 1.8–8)
NEUTS SEG NFR BLD: 61 % (ref 42–75)
NRBC BLD-RTO: 7 PER 100 WBC
PLATELET # BLD AUTO: 399 K/UL (ref 135–420)
PLATELET COMMENTS,PCOM: ABNORMAL
PMV BLD AUTO: 9.4 FL (ref 9.2–11.8)
POTASSIUM SERPL-SCNC: 4.4 MMOL/L (ref 3.5–5.5)
PROT SERPL-MCNC: 6.7 G/DL (ref 6.4–8.2)
RBC # BLD AUTO: 4.06 M/UL (ref 4.2–5.3)
RBC MORPH BLD: ABNORMAL
RBC MORPH BLD: ABNORMAL
SODIUM SERPL-SCNC: 140 MMOL/L (ref 136–145)
TROPONIN I SERPL-MCNC: 0.15 NG/ML (ref 0–0.04)
WBC # BLD AUTO: 10.8 K/UL (ref 4.6–13.2)

## 2018-06-08 PROCEDURE — 83880 ASSAY OF NATRIURETIC PEPTIDE: CPT | Performed by: PHYSICIAN ASSISTANT

## 2018-06-08 PROCEDURE — 71046 X-RAY EXAM CHEST 2 VIEWS: CPT

## 2018-06-08 PROCEDURE — 82550 ASSAY OF CK (CPK): CPT | Performed by: PHYSICIAN ASSISTANT

## 2018-06-08 PROCEDURE — 80053 COMPREHEN METABOLIC PANEL: CPT | Performed by: PHYSICIAN ASSISTANT

## 2018-06-08 PROCEDURE — 74011250636 HC RX REV CODE- 250/636: Performed by: EMERGENCY MEDICINE

## 2018-06-08 PROCEDURE — 83735 ASSAY OF MAGNESIUM: CPT | Performed by: PHYSICIAN ASSISTANT

## 2018-06-08 PROCEDURE — 85025 COMPLETE CBC W/AUTO DIFF WBC: CPT | Performed by: PHYSICIAN ASSISTANT

## 2018-06-08 PROCEDURE — 93005 ELECTROCARDIOGRAM TRACING: CPT

## 2018-06-08 PROCEDURE — 65660000004 HC RM CVT STEPDOWN

## 2018-06-08 PROCEDURE — 96374 THER/PROPH/DIAG INJ IV PUSH: CPT

## 2018-06-08 PROCEDURE — 99284 EMERGENCY DEPT VISIT MOD MDM: CPT

## 2018-06-08 RX ORDER — ONDANSETRON 2 MG/ML
4 INJECTION INTRAMUSCULAR; INTRAVENOUS
Status: DISCONTINUED | OUTPATIENT
Start: 2018-06-08 | End: 2018-06-11 | Stop reason: HOSPADM

## 2018-06-08 RX ORDER — OXYCODONE AND ACETAMINOPHEN 5; 325 MG/1; MG/1
1 TABLET ORAL
Status: DISCONTINUED | OUTPATIENT
Start: 2018-06-08 | End: 2018-06-11 | Stop reason: HOSPADM

## 2018-06-08 RX ORDER — IPRATROPIUM BROMIDE AND ALBUTEROL SULFATE 2.5; .5 MG/3ML; MG/3ML
3 SOLUTION RESPIRATORY (INHALATION)
Status: DISCONTINUED | OUTPATIENT
Start: 2018-06-08 | End: 2018-06-11 | Stop reason: HOSPADM

## 2018-06-08 RX ORDER — FUROSEMIDE 10 MG/ML
40 INJECTION INTRAMUSCULAR; INTRAVENOUS
Status: COMPLETED | OUTPATIENT
Start: 2018-06-08 | End: 2018-06-08

## 2018-06-08 RX ORDER — ENOXAPARIN SODIUM 100 MG/ML
1 INJECTION SUBCUTANEOUS ONCE
Status: DISCONTINUED | OUTPATIENT
Start: 2018-06-08 | End: 2018-06-09

## 2018-06-08 RX ORDER — ATORVASTATIN CALCIUM 40 MG/1
40 TABLET, FILM COATED ORAL
Status: DISCONTINUED | OUTPATIENT
Start: 2018-06-08 | End: 2018-06-11 | Stop reason: HOSPADM

## 2018-06-08 RX ORDER — FUROSEMIDE 10 MG/ML
40 INJECTION INTRAMUSCULAR; INTRAVENOUS EVERY 12 HOURS
Status: DISCONTINUED | OUTPATIENT
Start: 2018-06-09 | End: 2018-06-09

## 2018-06-08 RX ORDER — GUAIFENESIN 100 MG/5ML
81 LIQUID (ML) ORAL DAILY
Status: DISCONTINUED | OUTPATIENT
Start: 2018-06-09 | End: 2018-06-11 | Stop reason: HOSPADM

## 2018-06-08 RX ORDER — NALOXONE HYDROCHLORIDE 0.4 MG/ML
0.4 INJECTION, SOLUTION INTRAMUSCULAR; INTRAVENOUS; SUBCUTANEOUS AS NEEDED
Status: DISCONTINUED | OUTPATIENT
Start: 2018-06-08 | End: 2018-06-11 | Stop reason: HOSPADM

## 2018-06-08 RX ORDER — CARVEDILOL 6.25 MG/1
6.25 TABLET ORAL 2 TIMES DAILY WITH MEALS
Status: DISCONTINUED | OUTPATIENT
Start: 2018-06-09 | End: 2018-06-09

## 2018-06-08 RX ORDER — ACETAMINOPHEN 325 MG/1
650 TABLET ORAL
Status: DISCONTINUED | OUTPATIENT
Start: 2018-06-08 | End: 2018-06-11 | Stop reason: HOSPADM

## 2018-06-08 RX ORDER — DOCUSATE SODIUM 100 MG/1
100 CAPSULE, LIQUID FILLED ORAL
Status: DISCONTINUED | OUTPATIENT
Start: 2018-06-08 | End: 2018-06-11 | Stop reason: HOSPADM

## 2018-06-08 RX ADMIN — FUROSEMIDE 40 MG: 10 INJECTION, SOLUTION INTRAVENOUS at 21:26

## 2018-06-08 NOTE — IP AVS SNAPSHOT
[address] [address] 
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 
[phone] Evonne Clinton MD  we will call in am for appt/time 300 [facility] [id number] [phone] Your Scheduled Appointments   3:00 PM EDT  
POST HOSPITAL with Lissy Trinidad NP [facility] ([facility]) Dada Laila [place] [address] [phone] Discharge Orders None A check jitendra indicates which time of day the medication should be taken. My Medications START taking these medications Instructions Each Dose to Equal  
 Morning Noon Evening Bedtime  
 furosemide 40 mg tablet Commonly known as:  LASIX Your next dose is:  18  
   
 1 tab po daily  START DATE 2018 OTHER  
   
 BMP - do the test on 6-  
     
   
   
   
  
 raNITIdine 150 mg tablet Commonly known as:  ZANTAC Take 1 Tab by mouth two (2) times a day. 150 mg CONTINUE taking these medications Instructions Each Dose to Equal  
 Morning Noon Evening Bedtime * albuterol 2.5 mg /3 mL (0.083 %) nebulizer solution Commonly known as:  PROVENTIL VENTOLIN  
   
 3 mL by Nebulization route every four (4) hours as needed for Wheezing. 2.5 mg  
    
   
   
   
  
 * albuterol 90 mcg/actuation inhaler Commonly known as:  PROVENTIL HFA, VENTOLIN HFA, PROAIR HFA Take 2 Puffs by inhalation every four (4) hours as needed for Wheezing. 2 Puff  
    
   
   
   
  
 aspirin 81 mg chewable tablet Take 1 Tab by mouth daily. 81 mg  
    
  
   
   
   
  
 atorvastatin 20 mg tablet Commonly known as:  LIPITOR Take 1 Tab by mouth nightly. 20 mg  
    
   
   
   
  
  
 carvedilol 6.25 mg tablet Commonly known as:  Carrolyn Peabody Take 1 Tab by mouth two (2) times daily (with meals). 6.25 mg  
    
  
   
   
  
   
  
 desloratadine 5 mg tablet Commonly known as:  CLARINEX Take 1 Tab by mouth daily as needed for Allergies. 5 mg  
    
   
   
   
  
 esomeprazole 40 mg capsule Commonly known as:  NexIUM Take 1 Cap by mouth daily as needed. For reflux 40 mg  
    
   
   
   
  
 ferrous sulfate 325 mg (65 mg iron) tablet Take 1 Tab by mouth two (2) times a day. Indications: IRON DEFICIENCY ANEMIA  
 325 mg  
    
  
   
   
   
  
 insulin detemir U-100 100 unit/mL injection Commonly known as:  LEVEMIR U-100 INSULIN  
   
 15 units daily for diabetes  
     
   
   
   
  
 magnesium oxide 400 mg tablet Commonly known as:  MAG-OX Take 1 Tab by mouth daily. For low magnesium 400 mg  
    
  
   
   
   
  
 mometasone 50 mcg/actuation nasal spray Commonly known as:  NASONEX  
   
 2 Sprays by Both Nostrils route daily as needed. For allergies 2 Spray omega-3 acid ethyl esters 1 gram capsule Commonly known as:  Harish Zavala Take 2 Caps by mouth daily (with breakfast). 2 g  
    
  
   
   
   
  
 oxyCODONE-acetaminophen 5-325 mg per tablet Commonly known as:  PERCOCET Take 1 Tab by mouth every six (6) hours as needed. Max Daily Amount: 4 Tabs. 1 Tab  
    
   
   
   
  
 ticagrelor 90 mg tablet Commonly known as:  [employer] Take 1 Tab by mouth two (2) times a day. 90 mg  
    
  
   
   
  
   
  
 umeclidinium-vilanterol 62.5-25 mcg/actuation inhaler Commonly known as:  Vessie Matthias Take 1 Puff by inhalation daily. For COPD  
 1 Puff * Notice: This list has 2 medication(s) that are the same as other medications prescribed for you. Read the directions carefully, and ask your doctor or other care provider to review them with you. Where to Get Your Medications Information on where to get these meds will be given to you by the nurse or doctor. ! Ask your nurse or doctor about these medications  
  furosemide 40 mg tablet OTHER  
 raNITIdine 150 mg tablet Opioid Education Prescription Opioids: What You Need to Know: 
 
 
What to do at Home: 
Recommended activity: Activity as tolerated *  Please give a list of your current medications to your Primary Care Provider. *  Please update this list whenever your medications are discontinued, doses are 
    changed, or new medications (including over-the-counter products) are added. *  Please carry medication information at all times in case of emergency situations. These are general instructions for a healthy lifestyle: No smoking/ No tobacco products/ Avoid exposure to second hand smoke Surgeon General's Warning:  Quitting smoking now greatly reduces serious risk to your health. Obesity, smoking, and sedentary lifestyle greatly increases your risk for illness A healthy diet, regular physical exercise & weight monitoring are important for maintaining a healthy lifestyle You may be retaining fluid if you have a history of heart failure or if you experience any of the following symptoms:  Weight gain of 3 pounds or more overnight or 5 pounds in a week, increased swelling in our hands or feet or shortness of breath while lying flat in bed. Please call your doctor as soon as you notice any of these symptoms; do not wait until your next office visit. Recognize signs and symptoms of STROKE: 
 
F-face looks uneven A-arms unable to move or move unevenly S-speech slurred or non-existent T-time-call 911 as soon as signs and symptoms begin-DO NOT go Back to bed or wait to see if you get better-TIME IS BRAIN. Warning Signs of HEART ATTACK Call 911 if you have these symptoms: 
? Chest discomfort. Most heart attacks involve discomfort in the center of the chest that lasts more than a few minutes, or that goes away and comes back. It can feel like uncomfortable pressure, squeezing, fullness, or pain. ? Discomfort in other areas of the upper body. Symptoms can include pain or discomfort in one or both arms, the back, neck, jaw, or stomach. ? Shortness of breath with or without chest discomfort. ? Other signs may include breaking out in a cold sweat, nausea, or lightheadedness. Don't wait more than five minutes to call [address]! Fast action can save your life. Calling 911 is almost always the fastest way to get lifesaving treatment.  Emergency Medical Services staff can begin treatment when they arrive  up to an hour sooner than if someone gets to the hospital by car. The discharge information has been reviewed with the patient and spouse. The patient and spouse verbalized understanding. Discharge medications reviewed with the patient and spouse and appropriate educational materials and side effects teaching were provided. ___________________________________________________________________________________________________________________________________ Angina: Care Instructions Your Care Instructions You have a problem called angina. Angina happens when there is not enough blood flow to your heart muscle. Angina is a sign of coronary artery disease (CAD). CAD occurs when blood vessels that supply the heart become narrowed. Having CAD increases your risk of a heart attack. Chest pain or pressure is the most common symptom of angina. But some people have other symptoms, like: 
· Pain, pressure, or a strange feeling in the back, neck, jaw, or upper belly, or in one or both shoulders or arms. · Shortness of breath. · Nausea or vomiting. · Lightheadedness or sudden weakness. · Fast or irregular heartbeat. Women are somewhat more likely than men to have angina symptoms like shortness of breath, nausea, and back or jaw pain. Angina can be dangerous. That's why it is important to pay attention to your symptoms. Know what is typical for you, learn how to control your symptoms, and understand when you need to get treatment. A change in your usual pattern of symptoms is an emergency. It may mean that you are having a heart attack. The doctor has checked you carefully, but problems can develop later. If you notice any problems or new symptoms, get medical treatment right away. Follow-up care is a key part of your treatment and safety.  Be sure to make and go to all appointments, and call your doctor if you are having problems. It's also a good idea to know your test results and keep a list of the medicines you take. How can you care for yourself at home? Medicines ? · If your doctor has given you nitroglycerin for angina symptoms, keep it with you at all times. If you have symptoms, sit down and rest, and take the first dose of nitroglycerin as directed. If your symptoms get worse or are not getting better within 5 minutes, call 911 right away. Stay on the phone. The emergency  will give you further instructions. ? · If your doctor advises it, take 1 low-dose aspirin a day to prevent heart attack. ? · Be safe with medicines. Take your medicines exactly as prescribed. Call your doctor if you think you are having a problem with your medicine. You will get more details on the specific medicines your doctor prescribes. ? Lifestyle changes ? · Do not smoke. If you need help quitting, talk to your doctor about stop-smoking programs and medicines. These can increase your chances of quitting for good. ? · Eat a heart-healthy diet that is low in saturated fat and salt, and is high in fiber. Talk to your doctor or a dietitian about healthy eating. ? · Stay at a healthy weight. Or lose weight if you need to. Activity ? · Talk to your doctor about a level of activity that is safe for you. ? · If an activity causes angina symptoms, stop and rest.  
When should you call for help? Call 911 anytime you think you may need emergency care. For example, call if: 
? · You passed out (lost consciousness). ? · You have symptoms of a heart attack. These may include: ¨ Chest pain or pressure, or a strange feeling in the chest. 
¨ Sweating. ¨ Shortness of breath. ¨ Nausea or vomiting. ¨ Pain, pressure, or a strange feeling in the back, neck, jaw, or upper belly or in one or both shoulders or arms. ¨ Lightheadedness or sudden weakness. ¨ A fast or irregular heartbeat. After you call 911, the  may tell you to chew 1 adult-strength or 2 to 4 low-dose aspirin. Wait for an ambulance. Do not try to drive yourself. ? · You have angina symptoms that do not go away with rest or are not getting better within 5 minutes after you take a dose of nitroglycerin. ?Call your doctor now or seek immediate medical care if: 
? · You are having angina symptoms more often than usual, or they are different or worse than usual.  
? · You feel dizzy or lightheaded, or you feel like you may faint. ? Watch closely for changes in your health, and be sure to contact your doctor if you have any problems. Where can you learn more? Go to http://jason-charity.info/. Enter H129 in the search box to learn more about \"Angina: Care Instructions. \" Current as of: September 21, 2016 Content Version: 11.4 © 4682-7745 [employer]. Care instructions adapted under license by [employer] (which disclaims liability or warranty for this information). If you have questions about a medical condition or this instruction, always ask your healthcare professional. [place] [address] any warranty or liability for your use of this information. Chronic Obstructive Pulmonary Disease (COPD): Care Instructions Your Care Instructions Chronic obstructive pulmonary disease (COPD) is a general term for a group of lung diseases, including emphysema and chronic bronchitis. People with COPD have decreased airflow in and out of the lungs, which makes it hard to breathe. The airways also can get clogged with thick mucus. Cigarette smoking is a major cause of COPD. Although there is no cure for COPD, you can slow its progress. Following your treatment plan and taking care of yourself can help you feel better and live longer. Follow-up care is a key part of your treatment and safety.  Be sure to make and go to all appointments, and call your doctor if you are having problems. It's also a good idea to know your test results and keep a list of the medicines you take. How can you care for yourself at home? ?Staying healthy ? · Do not smoke. This is the most important step you can take to prevent more damage to your lungs. If you need help quitting, talk to your doctor about stop-smoking programs and medicines. These can increase your chances of quitting for good. ? · Avoid colds and flu. Get a pneumococcal vaccine shot. If you have had one before, ask your doctor whether you need a second dose. Get the flu vaccine every fall. If you must be around people with colds or the flu, wash your hands often. ? · Avoid secondhand smoke, air pollution, and high altitudes. Also avoid cold, dry air and hot, humid air. Stay at home with your windows closed when air pollution is bad. ?Medicines and oxygen therapy ? · Take your medicines exactly as prescribed. Call your doctor if you think you are having a problem with your medicine. ? · You may be taking medicines such as: ¨ Bronchodilators. These help open your airways and make breathing easier. Bronchodilators are either short-acting (work for 6 to 9 hours) or long-acting (work for 24 hours). You inhale most bronchodilators, so they start to act quickly. Always carry your quick-relief inhaler with you in case you need it while you are away from home. ¨ Corticosteroids (prednisone, budesonide). These reduce airway inflammation. They come in pill or inhaled form. You must take these medicines every day for them to work well. ? · A spacer may help you get more inhaled medicine to your lungs. Ask your doctor or pharmacist if a spacer is right for you. If it is, ask how to use it properly.   
? · Do not take any vitamins, over-the-counter medicine, or herbal products without talking to your doctor first.  
 ? · If your doctor prescribed antibiotics, take them as directed. Do not stop taking them just because you feel better. You need to take the full course of antibiotics. ? · Oxygen therapy boosts the amount of oxygen in your blood and helps you breathe easier. Use the flow rate your doctor has recommended, and do not change it without talking to your doctor first.  
Activity ? · Get regular exercise. Walking is an easy way to get exercise. Start out slowly, and walk a little more each day. ? · Pay attention to your breathing. You are exercising too hard if you cannot talk while you are exercising. ? · Take short rest breaks when doing household chores and other activities. ? · Learn breathing methods-such as breathing through pursed lips-to help you become less short of breath. ? · If your doctor has not set you up with a pulmonary rehabilitation program, talk to him or her about whether rehab is right for you. Rehab includes exercise programs, education about your disease and how to manage it, help with diet and other changes, and emotional support. Diet ? · Eat regular, healthy meals. Use bronchodilators about 1 hour before you eat to make it easier to eat. Eat several small meals instead of three large ones. Drink beverages at the end of the meal. Avoid foods that are hard to chew. ? · Eat foods that contain protein so that you do not lose muscle mass. ? · Talk with your doctor if you gain too much weight or if you lose weight without trying. ?Mental health ? · Talk to your family, friends, or a therapist about your feelings. It is normal to feel frightened, angry, hopeless, helpless, and even guilty. Talking openly about bad feelings can help you cope. If these feelings last, talk to your doctor. When should you call for help? Call 911 anytime you think you may need emergency care. For example, call if: 
? · You have severe trouble breathing. ?Call your doctor now or seek immediate medical care if: 
? · You have new or worse trouble breathing. ? · You cough up blood. ? · You have a fever. ? Watch closely for changes in your health, and be sure to contact your doctor if: 
? · You cough more deeply or more often, especially if you notice more mucus or a change in the color of your mucus. ? · You have new or worse swelling in your legs or belly. ? · You are not getting better as expected. Where can you learn more? Go to http://jason-charity.info/. Kayleen Winkler in the search box to learn more about \"Chronic Obstructive Pulmonary Disease (COPD): Care Instructions. \" Current as of: May 12, 2017 Content Version: 11.4 © 9575-9134 [employer]. Care instructions adapted under license by [employer] (which disclaims liability or warranty for this information). If you have questions about a medical condition or this instruction, always ask your healthcare professional. [place] [address] any warranty or liability for your use of this information. Learning About Diabetes and Coronary Artery Disease How are diabetes and heart disease connected? Many people think diabetes and heart disease go hand in hand. But having diabetes doesn't have to mean that you are going to have a heart attack someday. Healthy living can help prevent many of the problems that come with both diabetes and heart disease. For some people, diabetes can cause problems in your body that may lead to heart disease. Diabetes can make the problems of heart disease worse. Experts do not fully understand how diabetes affects the heart. Many things can lead to heart disease, including high blood sugar, insulin resistance, high cholesterol, and high blood pressure. But lifestyle and genetics may also affect a person's risk. But here's the good news:  The good things you're doing to stay healthy with diabetes-eating healthy foods, quitting smoking, getting exercise and more-are also helping your heart. What increases your risk for heart disease? When you have diabetes, your risk for heart disease is even higher if you have: 
· High blood pressure, which pushes blood through the arteries with too much force. Over time, this damages the walls of the arteries. · High cholesterol, which causes the buildup of a kind of fat inside the blood vessel walls. This buildup can lower blood flow to the heart muscle and raise your risk for having a heart attack. · Kidney damage, which shares many of the risk factors for heart disease (such as high blood sugar, high blood pressure, and high cholesterol). How can you keep your heart healthy when you have diabetes? Managing your diabetes and keeping your heart healthy are two sides of the same coin. Here are some things you can do. · Test your blood sugar levels and get your diabetes tests on schedule. Try to keep your numbers within your target range. · Keep track of your blood pressure. The target for most people with diabetes is below 140/90. Your doctor will give you a goal that's right for you. If your blood pressure is high, your treatment may also include medicine. Changes in your lifestyle, such as staying at a healthy weight, may also help you lower your blood pressure. · Eat heart-healthy foods. These include fruits, vegetables, whole grains, fish, and low-fat or nonfat dairy foods. Limit sodium, alcohol, and sweets. · If your doctor recommends it, get more exercise. Walking is a good choice. Bit by bit, increase the amount you walk every day. Try for at least 30 minutes on most days of the week. · Do not smoke. Smoking can make diabetes and heart disease worse. If you need help quitting, talk to your doctor about stop-smoking programs and medicines. These can increase your chances of quitting for good. · Your doctor may talk with you about taking medicines for your heart. For example, your doctor may suggest taking a statin or daily aspirin. Where can you learn more? Go to http://jason-charity.info/. Enter C525 in the search box to learn more about \"Learning About Diabetes and Coronary Artery Disease. \" Current as of: March 13, 2017 Content Version: 11.4 © 1047-4084 [employer]. Care instructions adapted under license by [employer] (which disclaims liability or warranty for this information). If you have questions about a medical condition or this instruction, always ask your healthcare professional. [place] [address] any warranty or liability for your use of this information. Pulmonary Edema: Care Instructions Your Care Instructions Pulmonary edema is the buildup of fluid in the lungs. It usually occurs when the heart does not pump blood through the body properly. Pulmonary edema can also be caused by another disease, such as liver or kidney failure. It can also happen at high altitudes, from a poisoning, or as a result of a near-drowning. If you have fluid in your lungs, you may have trouble breathing, be restless, have a fast heart rate, or cough up foamy pink fluid. Breathing problems may be worse when you lie down. Follow-up care is a key part of your treatment and safety. Be sure to make and go to all appointments, and call your doctor if you are having problems. It's also a good idea to know your test results and keep a list of the medicines you take. How can you care for yourself at home? Medicines ? · Take your medicines exactly as prescribed. Call your doctor if you think you are having a problem with your medicine. ? · Review all of your regular medicines with your doctor. Do not take any vitamins, over-the-counter medicines, or herbal products without talking to your doctor first.  
Diet ? · Eat a balanced diet. Make an appointment with a dietitian if you have questions about what type of diet might be best for you. ? · Do not eat more than 2,000 milligrams (mg) of sodium each day. That is less than 1 teaspoon of salt a day, including all the salt you eat in prepared or packaged foods. ¨ Do not add salt while you are cooking or at the table. Flavor with garlic, lemon juice, onion, vinegar, herbs, and spices instead of salt. ¨ Eat fewer processed foods and foods from restaurants, including fast food. ¨ Use fresh or frozen foods instead of canned. ¨ Count and record how much sodium you eat each day. Check food labels for sodium. ¨ Ask your doctor before using salt substitutes that have potassium, such as Lite Salt. ? Lifestyle ? · Stay out of air pollution; smog; cold, dry air; hot, humid air; and high altitudes. ? · Learn breathing methods that help the airflow in and out of your lungs. ? · Take rest breaks often. Schedule short rest breaks when doing housework and other activities. An occupational or physical therapist can help you find ways to do everyday activities with less effort. ? · Start light exercise if your doctor says it is okay. Try to stay as active as possible. If you have not exercised in the past, start out slowly. Walking is a good way to start. ? · Get enough rest at night. Sleeping with 1 or 2 pillows under your upper body and head may help you breathe easier at night. ? · Discuss rehabilitation with your doctor. Find out what programs are available in your area. ? · Do not smoke or use other tobacco products. Smoking can make your condition worse. If you need help quitting, talk to your doctor about stop-smoking programs and medicines. These can increase your chances of quitting for good. ? · Do not use alcohol or illegal drugs. When should you call for help? Call 911 anytime you think you may need emergency care. For example, call if: ? · You have severe trouble breathing. ? · You passed out (lost consciousness). ? · You have symptoms of a heart attack. These may include: ¨ Chest pain or pressure, or a strange feeling in the chest. 
¨ Sweating. ¨ Shortness of breath. ¨ Nausea or vomiting. ¨ Pain, pressure, or a strange feeling in the back, neck, jaw, or upper belly or in one or both shoulders or arms. ¨ Lightheadedness or sudden weakness. ¨ A fast or irregular heartbeat. Pain that spreads from the chest to the neck, jaw, or one or both shoulders or arms. ? After you call 911, the  may tell you to chew 1 adult-strength or 2 to 4 low-dose aspirin. Wait for an ambulance. Do not try to drive yourself. ?Call your doctor now or seek immediate medical care if: 
? · You have trouble breathing or have wheezing that is getting worse. ? · You are coughing more deeply or more often. ? · You cough up blood. ? · You get a fever. ? · You have more swelling in your legs or belly. ? · Your symptoms are getting worse. ? Watch closely for changes in your health, and be sure to contact your doctor if you have any problems. Where can you learn more? Go to http://jason-charity.info/. Enter F681 in the search box to learn more about \"Pulmonary Edema: Care Instructions. \" Current as of: May 12, 2017 Content Version: 11.4 © 4378-5856 [employer]. Care instructions adapted under license by [employer] (which disclaims liability or warranty for this information). If you have questions about a medical condition or this instruction, always ask your healthcare professional. [place] [address] any warranty or liability for your use of this information. DISCHARGE SUMMARY from Nurse PATIENT INSTRUCTIONS: 
 
What to do at Home: 
Recommended activity: Activity as tolerated *  Please give a list of your current medications to your Primary Care Provider. *  Please update this list whenever your medications are discontinued, doses are 
    changed, or new medications (including over-the-counter products) are added. *  Please carry medication information at all times in case of emergency situations. These are general instructions for a healthy lifestyle: No smoking/ No tobacco products/ Avoid exposure to second hand smoke Surgeon General's Warning:  Quitting smoking now greatly reduces serious risk to your health. Obesity, smoking, and sedentary lifestyle greatly increases your risk for illness A healthy diet, regular physical exercise & weight monitoring are important for maintaining a healthy lifestyle You may be retaining fluid if you have a history of heart failure or if you experience any of the following symptoms:  Weight gain of 3 pounds or more overnight or 5 pounds in a week, increased swelling in our hands or feet or shortness of breath while lying flat in bed. Please call your doctor as soon as you notice any of these symptoms; do not wait until your next office visit. Recognize signs and symptoms of STROKE: 
 
F-face looks uneven A-arms unable to move or move unevenly S-speech slurred or non-existent T-time-call 911 as soon as signs and symptoms begin-DO NOT go Back to bed or wait to see if you get better-TIME IS BRAIN. Warning Signs of HEART ATTACK Call 911 if you have these symptoms: 
? Chest discomfort. Most heart attacks involve discomfort in the center of the chest that lasts more than a few minutes, or that goes away and comes back. It can feel like uncomfortable pressure, squeezing, fullness, or pain. ? Discomfort in other areas of the upper body. Symptoms can include pain or discomfort in one or both arms, the back, neck, jaw, or stomach. ? Shortness of breath with or without chest discomfort. ? Other signs may include breaking out in a cold sweat, nausea, or lightheadedness. Don't wait more than five minutes to call [address]! Fast action can save your life. Calling 911 is almost always the fastest way to get lifesaving treatment. Emergency Medical Services staff can begin treatment when they arrive  up to an hour sooner than if someone gets to the hospital by car. The discharge information has been reviewed with the patient. The patient verbalized understanding. Discharge medications reviewed with the patient and appropriate educational materials and side effects teaching were provided. ___________________________________________________________________________________________________________________________________ [facility] Announcement We are excited to announce that we are making your provider's discharge notes available to you in HubCast. You will see these notes when they are completed and signed by the physician that discharged you from your recent hospital stay. If you have any questions or concerns about any information you see in HubCast, please call the Health Information Department where you were seen or reach out to your Primary Care Provider for more information about your plan of care. Introducing hospitals & HEALTH SERVICES! Mariam Mcnamara introduces HubCast patient portal. Now you can access parts of your medical record, email your doctor's office, and request medication refills online. 1. In your internet browser, go to [email or web address] 2. Click on the First Time User? Click Here link in the Sign In box. You will see the New Member Sign Up page. 3. Enter your HubCast Access Code exactly as it appears below. You will not need to use this code after youve completed the sign-up process. If you do not sign up before the expiration date, you must request a new code. · HubCast Access Code: [email or web address] Expires: 8/18/2018  7:44 PM 
 
4.  Enter the last four digits of your Social Security Number (xxxx) and Date of Birth (mm/dd/yyyy) as indicated and click Submit. You will be taken to the next sign-up page. 5. Create a Cynergent ID. This will be your [employer]Pat[employer] login ID and cannot be changed, so think of one that is secure and easy to remember. 6. Create a Cy[employer]gent password. You can change your password at any time. 7. Enter your Password Reset Question and Answer. This can be used at a later time if you forget your password. 8. Enter your e-mail address. You will receive e-mail notification when new information is available in [address] 9. Click Sign Up. You can now view and download portions of your medical record. 10. Click the Download Summary menu link to download a portable copy of your medical information. If you have questions, please visit the Frequently Asked Questions section of the CyberPatrol website. Remember, CyberPatrol is NOT to be used for urgent needs. For medical emergencies, dial 911. Now available from your iPhone and Android! Introducing Jimmy York As a [employer] patient, I wanted to make you aware of our electronic visit tool called Jimmy York. [employer] 24/7 allows you to connect within minutes with a medical provider 24 hours a day, seven days a week via a mobile device or tablet or logging into a secure website from your computer. You can access Jimmy York from anywhere in the [place]. A virtual visit might be right for you when you have a simple condition and feel like you just dont want to get out of bed, or cant get away from work for an appointment, when your regular [employer] provider is not available (evenings, weekends or holidays), or when youre out of town and need minor care. Electronic visits cost only $49 and if the [employer] 24/7 provider determines a prescription is needed to treat your condition, one can be electronically transmitted to a nearby pharmacy*. Please take a moment to enroll today if you have not already done so. The enrollment process is free and takes just a few minutes. To enroll, please download the [employer] 24/7 satya to your tablet or phone, or visit [email or web address] to enroll on your computer. And, as an [address] [place] [address] patient with a [employer] account, the results of your visits will be scanned into your electronic medical record and your primary care provider will be able to view the scanned results. We urge you to continue to see your regular [employer] provider for your ongoing medical care. And while your primary care provider may not be the one available when you seek a [employer] virtual visit, the peace of mind you get from getting a real diagnosis real time can be priceless. For more information on Prescription Eyewear, view our Frequently Asked Questions (FAQs) at [email or web address]. Sincerely, 
 
Ellen Hernández MD 
Chief Medical Officer [facility] Steffi Delfin *:  certain medications cannot be prescribed via [employer] Unresulted tests-please follow up with your PCP on these results Procedure/Test Authorizing Provider  CARDIAC PANEL,(CK, CKMB & TROPONIN) Ijeoma Gonzalez MD  
 CARDIAC PANEL,(CK, CKMB & TROPONIN) Ijeoma Gonzalez MD  
 CARDIAC PANEL,(CK, CKMB & TROPONIN) Ijeoma Gonzalez MD  
 CARDIAC PANEL,(CK, CKMB & TROPONIN) Mayela Restrepo PA-C  
 CBC WITH AUTOMATED DIFF Ijeoma Gonzalez MD  
 CBC WITH AUTOMATED DIFF Mayela Restrepo PA-C  
 CT Birgit Couch MD  
 EKG, 12 LEAD, INITIAL Mayela Restrepo PA-C  
 EKG, 12 LEAD, SUBSEQUENT Ijeoma Gonzalez MD  
 HEMOGLOBIN A1C WITH MD Mason Menendez MD  
 MAGNESIUM Mayela Martinez PA-C  
 METABOLIC PANEL, Zohra Byrd MD  
 Joon Fairbanks MD  
 METABOLIC PANEL, Cary Mendoza MD  
 METABOLIC PANEL, COMPREHENSIVE April SURY Winkler  
 NT-PRO BNP Mayela Restrepo PA-C  
 PHOSPHORUS Ezio Jaramillo MD  
 XR CHEST PA LAT Mayela Restrepo [place] Providers Seen During Your Hospitalization Provider Specialty Primary office phone Dez Solis MD Emergency Medicine [phone] Ezio Jaramillo MD Hospitalist [phone] Jessi Louise MD Internal Medicine [phone] Your Primary Care Physician (PCP) Primary Care Physician Office Phone Office Fax Williams Nair [phone] You are allergic to the following No active allergies Recent Documentation Height Weight BMI OB Status Smoking Status 1.626 m 91.9 kg 34.78 kg/m2 Menopause Former Smoker Emergency Contacts Name Discharge Info Relation Home Work Mobile [employer] [address] CAREGIVER [3] Spouse [3] [phone] Patient Belongings The following personal items are in your possession at time of discharge: 
  Dental Appliances: None         Home Medications: None   Jewelry: Ring, With patient  Clothing: Footwear, Pants, Shirt, Undergarments, At bedside    Other Valuables: [employer], Purse, At bedside Discharge Instructions Attachments/References MEFS - FUROSEMIDE (LASIX) - (BY MOUTH) (ENGLISH) MEFS - RANITIDINE (ZANTAC, ZANTAC 150, ZANTAC 300, ZANTAC 75) - (BY MOUTH) (ENGLISH) Patient Handouts Furosemide (Lasix) - (By mouth) Why this medicine is used:  
Treats fluid retention and high blood pressure. Contact a nurse or doctor right away if you have: · Blistering, peeling, red skin rash · Lightheadedness, dizziness, fainting · Dry mouth, increased thirst, muscle cramps, nausea or vomiting · Uneven heartbeat · Hearing loss, ringing in the ears Common side effects: · Loss of appetite, stomach cramps © 2017 [facility] Information is for End User's use only and may not be sold, redistributed or otherwise used for commercial purposes. Ranitidine (Zantac, Zantac 150, Zantac 300, Zantac 75) - (By mouth) Why this medicine is used:  
Treats ulcers, heartburn, and gastroesophageal reflux disease (GERD). Contact a nurse or doctor right away if you have: · Blistering, peeling, red skin rash · Unusual bleeding, bruising, weakness · Dark urine or pale stools · Nausea, vomiting, loss of appetite, stomach pain · Yellow skin or eyes Common side effects: 
· Constipation, diarrhea 
· Headache © 2017 [facility] Information is for End User's use only and may not be sold, redistributed or otherwise used for commercial purposes. Please provide this summary of care documentation to your next provider. Signatures-by signing, you are acknowledging that this After Visit Summary has been reviewed with you and you have received a copy. Patient Signature:  ____________________________________________________________ Date:  ____________________________________________________________  
  
Susu Odom Provider Signature:  ____________________________________________________________ Date:  ____________________________________________________________

## 2018-06-08 NOTE — ED PROVIDER NOTES
EMERGENCY DEPARTMENT HISTORY AND PHYSICAL EXAM    7:59 PM      Date: 6/8/2018  Patient Name: Melida Osborne    History of Presenting Illness     Chief Complaint   Patient presents with    Shortness of Breath         History Provided By: Patient    Chief Complaint: SOB  Duration:  Hours  Timing:  Acute and Constant  Location:n/a  Quality: n/a  Severity: N/A  Modifying Factors: none  Associated Symptoms: dry cough       Additional History (Context): Melida Osborne is a 74 y.o. female with HTN, DM, COPD, non compliant, and pain management who presents with SOB with dry cough that started suddenly this morning and ha been constant through out the day. The pt report this episode of SOB started about 0200 this morning. Pt has albuterol inhaler and nebulizer at home but still didn't give any relief of sx. Reports she recently had MI 5/20. Her cardiologist is Dr. Uziel Horton with [facility]. Denies CP, fever, chills, nausea, vomiting, back pain, neck pain, weakness, numbness, or any other associated sx. No other complaints or concerns in the ED. As the patient is without physical symptoms or complaints of pain, there is no severity of pain, quality of pain, duration, modifying factors, or associated signs and symptoms regarding the pt's presenting complaint.       PCP: Carlota Tran MD    Current Facility-Administered Medications   Medication Dose Route Frequency Provider Last Rate Last Dose    isosorbide mononitrate ER (IMDUR) tablet 30 mg  30 mg Oral DAILY Sky Payne MD   30 mg at 06/09/18 0343    pantoprazole (PROTONIX) tablet 40 mg  40 mg Oral ACB Sky Payne MD   40 mg at 06/09/18 0823    carvedilol (COREG) tablet 6.25 mg  6.25 mg Oral Q12H Sky Payne MD   6.25 mg at 06/09/18 0823    furosemide (LASIX) tablet 40 mg  40 mg Oral DAILY LORE Russell   40 mg at 06/09/18 0855    aspirin chewable tablet 81 mg  81 mg Oral DAILY Sky Payne MD   81 mg at 06/09/18 0823    atorvastatin (LIPITOR) tablet 40 mg  40 mg Oral QHS John Marr MD        ticagrelor [facility]) tablet 90 mg  90 mg Oral BID John Marr MD   90 mg at 06/09/18 0392    acetaminophen (TYLENOL) tablet 650 mg  650 mg Oral Q6H PRN John Marr MD        oxyCODONE-acetaminophen (PERCOCET) 5-325 mg per tablet 1 Tab  1 Tab Oral Q6H PRN John Marr MD   1 Tab at 06/09/18 0836    naloxone (NARCAN) injection 0.4 mg  0.4 mg IntraVENous PRN John Marr MD        ondansetron [facility]) injection 4 mg  4 mg IntraVENous Q6H PRN John Marr MD        docusate sodium (COLACE) capsule 100 mg  100 mg Oral BID PRN John Marr MD        albuterol-ipratropium (DUO-NEB) 2.5 MG-0.5 MG/3 ML  3 mL Nebulization Q6H PRN John Marr MD           Past History     Past Medical History:  Past Medical History:   Diagnosis Date    Abnormal WBC count 5/17/2016    Bilateral shoulder pain 9/27/2016    Chondromalacia of both patellae     Chronic lung disease     Chronic obstructive pulmonary disease ([facility] 5/17/2016    COPD (chronic obstructive pulmonary disease) ([facility] 9/2015    mild-mod dz; Dr Salazar Me    Diabetes [facility]) 2013    Diabetes mellitus ([facility]     Diabetic eye exam ([facility] 2016    Dilated cardiomyopathy ([facility]     Dyslipidemia     History of echocardiogram 11/14/2014    Mild LVE. EF 40%. Mild, diffuse hypk. Mild LAE. Mild MR.      Hypercholesteremia 1/08/14    Hypertension 2000    Noncompliance with medications 2/16/2016    Obesity     Orthostatic hypotension 5/17/2016    Osteoarthritis of both knees     Pain management 04/01/2016    Dr. Dodd Fall Popliteal cyst, bilateral      Vitamin D deficiency 10/16/14       Past Surgical History:  No past surgical history on file.     Family History:  Family History   Problem Relation Age of Onset    Diabetes Mother     Hypertension Mother     Hypertension Father     Kidney Disease Maternal Aunt 48     Dialysis       Social History:  Social History   Substance Use Topics    Smoking status: Former Smoker     Packs/day: 0.25     Years: 48.00     Types: Cigarettes     Quit date: 5/20/2018    Smokeless tobacco: Never Used    Alcohol use No       Allergies:  No Known Allergies      Review of Systems       Review of Systems   Constitutional: Negative for chills and fever. Respiratory: Positive for cough and shortness of breath. Cardiovascular: Negative for chest pain. Gastrointestinal: Negative for abdominal pain, diarrhea, nausea and vomiting. Musculoskeletal: Negative for back pain and myalgias. Neurological: Negative for weakness and numbness. All other systems reviewed and are negative. Physical Exam     Visit Vitals    /74    Pulse (!) 103    Temp 98.3 °F (36.8 °C)    Resp 18    Ht 5' 4\" (1.626 m)    Wt 92.6 kg (204 lb 3.2 oz)    SpO2 95%    BMI 35.05 kg/m2       Physical exam:  General: Well-developed, well-nourished, mildly short of breath. Head: Normocephalic atraumatic. Eyes: Pupils midrange extraocular movements intact. No pallor or conjunctival injection. Nose: No rhinorrhea, inspection grossly normal.  Ears: Grossly normal to inspection, no discharge. Mouth: Mucous membranes moist, no appreciable intraoral lesion. Neck/Back: Trachea midline, no asymmetry. Chest: Grossly normal inspection, symmetric chest rise. Pulmonary: Crackles diffusely. Cardiovascular: S1-S2 no murmurs rubs or gallops. Abdomen: Soft, nontender, nondistended no guarding rebound or peritoneal signs. Extremities: Grossly normal to inspection, peripheral pulses intact  Neurologic: Alert and oriented no appreciable focal neurologic deficit. Skin: Warm and dry  Psychiatric: Grossly normal mood and affect. Nursing note reviewed, vital signs reviewed. ED course:  Patient presents with shortness breaths an onset today. 10 days ago she had a ST ARACELIS, interventions to her LAD ×2.  She is afebrile hypertensive saturation normal on room air. Monitor normal sinus    We'll labs troponin elevated 0.15, she was given aspirin she has no active chest pain only reports as mild burning sensation, was concerned that this could be a Trusler is or gallop rupture given her acute onset of symptoms per the EMR she had pulmonary edema in the past, getting her STEMI. BNP elevated at 11,700      EKG done prior to my shift initiation at 1808 hrs. no sinus rhythm heart rate 94 intervals within normal limits, T-wave inversion in the lateral leads V4 through V5 as well as high lateral leads, there is no ST elevation. There are Q waves in high lateral leads    Consult: Discussed care with Josef PHILLIPS). Standard discussion; including history of patients chief complaint, available diagnostic results, and treatment course. will  in AM, recontact if unstable for stat echo    Discuss case with hospitalist, wants a CT of the chest without contrast prior to admission, consult attending cardiologist, he spoke with Dr. Sydnie Iniguez in the emergency department      Patient's presentation, history, physical exam and laboratory evaluations were reviewed. I felt the patient would benefit from inpatient management and treatment. Consult: Discussed care with Dr. Navin Coates. Standard discussion; including history of patients chief complaint, available diagnostic results, and treatment course. Patient was accepted to their service. Disposition:    Admitted to medicine service      Portions of this chart were created with Dragon medical speech to text program. Unrecognized errors may be present.        Physical Exam      Diagnostic Study Results     Labs -  Recent Results (from the past 12 hour(s))   CARDIAC PANEL,(CK, CKMB & TROPONIN)    Collection Time: 06/09/18 12:46 AM   Result Value Ref Range     26 - 192 U/L    CK - MB 1.5 <3.6 ng/ml    CK-MB Index 0.9 0.0 - 4.0 %    Troponin-I, Qt. 0.15 (H) 0.0 - 0.045 NG/ML   EKG, 12 LEAD, SUBSEQUENT Collection Time: 06/09/18  1:00 AM   Result Value Ref Range    Ventricular Rate 88 BPM    Atrial Rate 88 BPM    P-R Interval 156 ms    QRS Duration 78 ms    Q-T Interval 378 ms    QTC Calculation (Bezet) 457 ms    Calculated P Axis 58 degrees    Calculated R Axis 38 degrees    Calculated T Axis 118 degrees    Diagnosis       Normal sinus rhythm  Lateral infarct (cited on or before 20-MAY-2018)  Abnormal ECG  When compared with ECG of 08-JUN-2018 18:08,  No significant change was found     CARDIAC PANEL,(CK, CKMB & TROPONIN)    Collection Time: 06/09/18  7:15 AM   Result Value Ref Range     26 - 192 U/L    CK - MB 1.5 <3.6 ng/ml    CK-MB Index 0.8 0.0 - 4.0 %    Troponin-I, Qt. 0.12 (H) 0.0 - 8.448 NG/ML   METABOLIC PANEL, BASIC    Collection Time: 06/09/18  8:28 AM   Result Value Ref Range    Sodium 139 136 - 145 mmol/L    Potassium 4.1 3.5 - 5.5 mmol/L    Chloride 103 100 - 108 mmol/L    CO2 23 21 - 32 mmol/L    Anion gap 13 3.0 - 18 mmol/L    Glucose 127 (H) 74 - 99 mg/dL    BUN 35 (H) 7.0 - 18 MG/DL    Creatinine 1.64 (H) 0.6 - 1.3 MG/DL    BUN/Creatinine ratio 21 (H) 12 - 20      GFR est AA 38 (L) >60 ml/min/1.73m2    GFR est non-AA 31 (L) >60 ml/min/1.73m2    Calcium 9.3 8.5 - 10.1 MG/DL   MAGNESIUM    Collection Time: 06/09/18  8:28 AM   Result Value Ref Range    Magnesium 1.8 1.6 - 2.6 mg/dL   PHOSPHORUS    Collection Time: 06/09/18  8:28 AM   Result Value Ref Range    Phosphorus 5.0 (H) 2.5 - 4.9 MG/DL   CBC WITH AUTOMATED DIFF    Collection Time: 06/09/18  8:28 AM   Result Value Ref Range    WBC 10.3 4.6 - 13.2 K/uL    RBC 4.43 4.20 - 5.30 M/uL    HGB 12.4 12.0 - 16.0 g/dL    HCT 37.7 35.0 - 45.0 %    MCV 85.1 74.0 - 97.0 FL    MCH 28.0 24.0 - 34.0 PG    MCHC 32.9 31.0 - 37.0 g/dL    RDW 16.2 (H) 11.6 - 14.5 %    PLATELET 615 707 - 414 K/uL    MPV 9.6 9.2 - 11.8 FL    NEUTROPHILS PENDING %    LYMPHOCYTES PENDING %    MONOCYTES PENDING %    EOSINOPHILS PENDING %    BASOPHILS PENDING %    ABS. NEUTROPHILS PENDING K/UL    ABS. LYMPHOCYTES PENDING K/UL    ABS. MONOCYTES PENDING K/UL    ABS. EOSINOPHILS PENDING K/UL    ABS. BASOPHILS PENDING K/UL    DF PENDING        Radiologic Studies -   CT CHEST WO CONT   Final Result      XR CHEST PA LAT   Final Result      CXR:  IMPRESSION:  Interval removal of support lines and tubes without pneumothorax. Similar central venous congestion. Trace pleural effusions. .               Medical Decision Making   I am the first provider for this patient. I reviewed the vital signs, available nursing notes, past medical history, past surgical history, family history and social history. Vital Signs-Reviewed the patient's vital signs. Pulse Oximetry Analysis -  98 on room air (Interpretation)non hypoxic      Follow-up Information     None           Current Discharge Medication List      CONTINUE these medications which have NOT CHANGED    Details   carvedilol (COREG) 6.25 mg tablet Take 1 Tab by mouth two (2) times daily (with meals). Qty: 60 Tab, Refills: 0      atorvastatin (LIPITOR) 20 mg tablet Take 1 Tab by mouth nightly. Qty: 30 Tab, Refills: 0      aspirin 81 mg chewable tablet Take 1 Tab by mouth daily. Qty: 30 Tab, Refills: 0      ticagrelor (BRILINTA) 90 mg tablet Take 1 Tab by mouth two (2) times a day. Qty: 60 Tab, Refills: 0      oxyCODONE-acetaminophen (PERCOCET) 5-325 mg per tablet Take 1 Tab by mouth every six (6) hours as needed. Max Daily Amount: 4 Tabs. Qty: 10 Tab, Refills: 0    Associated Diagnoses: ST elevation myocardial infarction involving left anterior descending (LAD) coronary artery ([facility])      insulin detemir (LEVEMIR) 100 unit/mL injection 15 units daily for diabetes  Qty: 2 Vial, Refills: 0    Associated Diagnoses: Diabetes mellitus type 2, insulin dependent ([facility])      albuterol (PROVENTIL HFA, VENTOLIN HFA, PROAIR HFA) 90 mcg/actuation inhaler Take 2 Puffs by inhalation every four (4) hours as needed for Wheezing.   Qty: 1 Inhaler, Refills: 3    Associated Diagnoses: Chronic obstructive pulmonary disease, unspecified COPD type ([facility] [address] Former smoker      omega-3 acid ethyl esters (LOVAZA) 1 gram capsule Take 2 Caps by mouth daily (with breakfast). Qty: 60 Cap, Refills: 3    Associated Diagnoses: Hypertriglyceridemia      magnesium oxide (MAG-OX) 400 mg tablet Take 1 Tab by mouth daily. For low magnesium  Qty: 30 Tab, Refills: 11    Associated Diagnoses: Hypomagnesemia      desloratadine (CLARINEX) 5 mg tablet Take 1 Tab by mouth daily as needed for Allergies. Qty: 30 Tab, Refills: 0    Associated Diagnoses: Environmental allergies      mometasone (NASONEX) 50 mcg/actuation nasal spray 2 Sprays by Both Nostrils route daily as needed. For allergies  Qty: 3 Container, Refills: 3    Associated Diagnoses: Environmental allergies      umeclidinium-vilanterol (ANORO ELLIPTA) 62.5-25 mcg/actuation inhaler Take 1 Puff by inhalation daily. For COPD  Qty: 3 Inhaler, Refills: 3    Comments: 9/22/15 Dr Yuli Boland prescribed. Will order via [facility] if available  Associated Diagnoses: Chronic obstructive pulmonary disease, unspecified COPD type ([facility]      esomeprazole (NEXIUM) 40 mg capsule Take 1 Cap by mouth daily as needed. For reflux  Qty: 90 Cap, Refills: 0    Associated Diagnoses: Gastroesophageal reflux disease with esophagitis      albuterol (PROVENTIL VENTOLIN) 2.5 mg /3 mL (0.083 %) nebulizer solution 3 mL by Nebulization route every four (4) hours as needed for Wheezing. Qty: 60 Each, Refills: 3    Associated Diagnoses: Chronic obstructive pulmonary disease, unspecified COPD type ([facility])      ferrous sulfate 325 mg (65 mg iron) tablet Take 1 Tab by mouth two (2) times a day.  Indications: IRON DEFICIENCY ANEMIA  Qty: 60 Tab, Refills: 11    Associated Diagnoses: Iron deficiency anemia           _______________________________    Attestations:  Riky Begum acting as a scribe for and in the presence of MD Macarena Almanza 08, 2018 at 7:59 PM       Provider Attestation:      I personally performed the services described in the documentation, reviewed the documentation, as recorded by the scribe in my presence, and it accurately and completely records my words and actions.  June 08, 2018 at 7:59 PM - Deidra Sanchez MD    _______________________________

## 2018-06-08 NOTE — IP AVS SNAPSHOT
[address] [address] 
[phone] Patient: Roger Anders MRN: [id number] :1950 A check jitendra indicates which time of day the medication should be taken. My Medications START taking these medications Instructions Each Dose to Equal  
 Morning Noon Evening Bedtime  
 furosemide 40 mg tablet Commonly known as:  LASIX Your next dose is:  18  
   
 1 tab po daily  START DATE 2018 OTHER  
   
 BMP - do the test on 6-  
     
   
   
   
  
 raNITIdine 150 mg tablet Commonly known as:  ZANTAC Take 1 Tab by mouth two (2) times a day. 150 mg CONTINUE taking these medications Instructions Each Dose to Equal  
 Morning Noon Evening Bedtime * albuterol 2.5 mg /3 mL (0.083 %) nebulizer solution Commonly known as:  PROVENTIL VENTOLIN  
   
 3 mL by Nebulization route every four (4) hours as needed for Wheezing. 2.5 mg  
    
   
   
   
  
 * albuterol 90 mcg/actuation inhaler Commonly known as:  PROVENTIL HFA, VENTOLIN HFA, PROAIR HFA Take 2 Puffs by inhalation every four (4) hours as needed for Wheezing. 2 Puff  
    
   
   
   
  
 aspirin 81 mg chewable tablet Take 1 Tab by mouth daily. 81 mg  
    
  
   
   
   
  
 atorvastatin 20 mg tablet Commonly known as:  LIPITOR Take 1 Tab by mouth nightly. 20 mg  
    
   
   
   
  
  
 carvedilol 6.25 mg tablet Commonly known as:  Joe Dole Take 1 Tab by mouth two (2) times daily (with meals). 6.25 mg  
    
  
   
   
  
   
  
 desloratadine 5 mg tablet Commonly known as:  CLARINEX Take 1 Tab by mouth daily as needed for Allergies. 5 mg  
    
   
   
   
  
 esomeprazole 40 mg capsule Commonly known as:  NexIUM Take 1 Cap by mouth daily as needed. For reflux  40 mg  
    
   
   
   
  
 ferrous sulfate 325 mg (65 mg iron) tablet Take 1 Tab by mouth two (2) times a day. Indications: IRON DEFICIENCY ANEMIA  
 325 mg  
    
  
   
   
   
  
 insulin detemir U-100 100 unit/mL injection Commonly known as:  LEVEMIR U-100 INSULIN  
   
 15 units daily for diabetes  
     
   
   
   
  
 magnesium oxide 400 mg tablet Commonly known as:  MAG-OX Take 1 Tab by mouth daily. For low magnesium 400 mg  
    
  
   
   
   
  
 mometasone 50 mcg/actuation nasal spray Commonly known as:  NASONEX  
   
 2 Sprays by Both Nostrils route daily as needed. For allergies 2 Spray  
    
   
   
   
  
 omega-3 acid ethyl esters 1 gram capsule Commonly known as:  Mooney Yolande Take 2 Caps by mouth daily (with breakfast). 2 g  
    
  
   
   
   
  
 oxyCODONE-acetaminophen 5-325 mg per tablet Commonly known as:  PERCOCET Take 1 Tab by mouth every six (6) hours as needed. Max Daily Amount: 4 Tabs. 1 Tab  
    
   
   
   
  
 ticagrelor 90 mg tablet Commonly known as:  [employer] Take 1 Tab by mouth two (2) times a day. 90 mg  
    
  
   
   
  
   
  
 umeclidinium-vilanterol 62.5-25 mcg/actuation inhaler Commonly known as:  Jeannetta Susan Take 1 Puff by inhalation daily. For COPD  
 1 Puff * Notice: This list has 2 medication(s) that are the same as other medications prescribed for you. Read the directions carefully, and ask your doctor or other care provider to review them with you. Where to Get Your Medications Information on where to get these meds will be given to you by the nurse or doctor. ! Ask your nurse or doctor about these medications  
  furosemide 40 mg tablet OTHER  
 raNITIdine 150 mg tablet

## 2018-06-08 NOTE — ED TRIAGE NOTES
Patient states that she is feeling short of breath. She states she just can't catch her breath. She has had a recent STEMI.

## 2018-06-08 NOTE — ED NOTES
Bedside and Verbal shift change report given to Ellie Narvaez RN (oncoming nurse) by Kris Sharma RN (offgoing nurse). Report included the following information SBAR, ED Summary, MAR and Recent Results.

## 2018-06-09 ENCOUNTER — APPOINTMENT (OUTPATIENT)
Dept: CT IMAGING | Age: 68
DRG: 282 | End: 2018-06-09
Attending: INTERNAL MEDICINE
Payer: MEDICARE

## 2018-06-09 PROBLEM — R07.9 CHEST PAIN: Status: ACTIVE | Noted: 2018-06-09

## 2018-06-09 PROBLEM — R06.02 SOB (SHORTNESS OF BREATH): Status: ACTIVE | Noted: 2018-06-09

## 2018-06-09 PROBLEM — I25.110 CORONARY ARTERY DISEASE INVOLVING NATIVE CORONARY ARTERY WITH UNSTABLE ANGINA PECTORIS (HCC): Status: ACTIVE | Noted: 2018-06-09

## 2018-06-09 LAB
ANION GAP SERPL CALC-SCNC: 13 MMOL/L (ref 3–18)
BASOPHILS # BLD: 0 K/UL (ref 0–0.06)
BASOPHILS NFR BLD: 0 % (ref 0–3)
BUN SERPL-MCNC: 35 MG/DL (ref 7–18)
BUN/CREAT SERPL: 21 (ref 12–20)
CALCIUM SERPL-MCNC: 9.3 MG/DL (ref 8.5–10.1)
CHLORIDE SERPL-SCNC: 103 MMOL/L (ref 100–108)
CK MB CFR SERPL CALC: 0.8 % (ref 0–4)
CK MB CFR SERPL CALC: 0.9 % (ref 0–4)
CK MB CFR SERPL CALC: 0.9 % (ref 0–4)
CK MB SERPL-MCNC: 1.3 NG/ML (ref 5–25)
CK MB SERPL-MCNC: 1.5 NG/ML (ref 5–25)
CK MB SERPL-MCNC: 1.5 NG/ML (ref 5–25)
CK SERPL-CCNC: 139 U/L (ref 26–192)
CK SERPL-CCNC: 170 U/L (ref 26–192)
CK SERPL-CCNC: 179 U/L (ref 26–192)
CO2 SERPL-SCNC: 23 MMOL/L (ref 21–32)
CREAT SERPL-MCNC: 1.64 MG/DL (ref 0.6–1.3)
DIFFERENTIAL METHOD BLD: ABNORMAL
EOSINOPHIL # BLD: 0.1 K/UL (ref 0–0.4)
EOSINOPHIL NFR BLD: 1 % (ref 0–5)
ERYTHROCYTE [DISTWIDTH] IN BLOOD BY AUTOMATED COUNT: 16.2 % (ref 11.6–14.5)
GLUCOSE SERPL-MCNC: 127 MG/DL (ref 74–99)
HCT VFR BLD AUTO: 37.7 % (ref 35–45)
HGB BLD-MCNC: 12.4 G/DL (ref 12–16)
LYMPHOCYTES # BLD: 3.9 K/UL (ref 0.8–3.5)
LYMPHOCYTES NFR BLD: 38 % (ref 20–51)
MAGNESIUM SERPL-MCNC: 1.8 MG/DL (ref 1.6–2.6)
MCH RBC QN AUTO: 28 PG (ref 24–34)
MCHC RBC AUTO-ENTMCNC: 32.9 G/DL (ref 31–37)
MCV RBC AUTO: 85.1 FL (ref 74–97)
MONOCYTES # BLD: 0.8 K/UL (ref 0–1)
MONOCYTES NFR BLD: 8 % (ref 2–9)
NEUTS SEG # BLD: 5.5 K/UL (ref 1.8–8)
NEUTS SEG NFR BLD: 53 % (ref 42–75)
NRBC BLD-RTO: 3 PER 100 WBC
PHOSPHATE SERPL-MCNC: 5 MG/DL (ref 2.5–4.9)
PLATELET # BLD AUTO: 409 K/UL (ref 135–420)
PLATELET COMMENTS,PCOM: ABNORMAL
PMV BLD AUTO: 9.6 FL (ref 9.2–11.8)
POTASSIUM SERPL-SCNC: 4.1 MMOL/L (ref 3.5–5.5)
RBC # BLD AUTO: 4.43 M/UL (ref 4.2–5.3)
RBC MORPH BLD: ABNORMAL
RBC MORPH BLD: ABNORMAL
SODIUM SERPL-SCNC: 139 MMOL/L (ref 136–145)
TROPONIN I SERPL-MCNC: 0.11 NG/ML (ref 0–0.04)
TROPONIN I SERPL-MCNC: 0.12 NG/ML (ref 0–0.04)
TROPONIN I SERPL-MCNC: 0.15 NG/ML (ref 0–0.04)
WBC # BLD AUTO: 10.3 K/UL (ref 4.6–13.2)

## 2018-06-09 PROCEDURE — 84100 ASSAY OF PHOSPHORUS: CPT | Performed by: INTERNAL MEDICINE

## 2018-06-09 PROCEDURE — 71250 CT THORAX DX C-: CPT

## 2018-06-09 PROCEDURE — 36415 COLL VENOUS BLD VENIPUNCTURE: CPT | Performed by: INTERNAL MEDICINE

## 2018-06-09 PROCEDURE — 65660000004 HC RM CVT STEPDOWN

## 2018-06-09 PROCEDURE — 85025 COMPLETE CBC W/AUTO DIFF WBC: CPT | Performed by: INTERNAL MEDICINE

## 2018-06-09 PROCEDURE — 83735 ASSAY OF MAGNESIUM: CPT | Performed by: INTERNAL MEDICINE

## 2018-06-09 PROCEDURE — 93005 ELECTROCARDIOGRAM TRACING: CPT

## 2018-06-09 PROCEDURE — 74011250636 HC RX REV CODE- 250/636: Performed by: INTERNAL MEDICINE

## 2018-06-09 PROCEDURE — 74011250637 HC RX REV CODE- 250/637: Performed by: PHYSICIAN ASSISTANT

## 2018-06-09 PROCEDURE — 74011250637 HC RX REV CODE- 250/637: Performed by: INTERNAL MEDICINE

## 2018-06-09 PROCEDURE — 80048 BASIC METABOLIC PNL TOTAL CA: CPT | Performed by: INTERNAL MEDICINE

## 2018-06-09 PROCEDURE — 82550 ASSAY OF CK (CPK): CPT | Performed by: INTERNAL MEDICINE

## 2018-06-09 RX ORDER — CARVEDILOL 6.25 MG/1
6.25 TABLET ORAL EVERY 12 HOURS
Status: DISCONTINUED | OUTPATIENT
Start: 2018-06-09 | End: 2018-06-11 | Stop reason: HOSPADM

## 2018-06-09 RX ORDER — HYDRALAZINE HYDROCHLORIDE 10 MG/1
10 TABLET, FILM COATED ORAL 3 TIMES DAILY
Status: DISCONTINUED | OUTPATIENT
Start: 2018-06-09 | End: 2018-06-09

## 2018-06-09 RX ORDER — CARVEDILOL 12.5 MG/1
12.5 TABLET ORAL 2 TIMES DAILY WITH MEALS
Status: DISCONTINUED | OUTPATIENT
Start: 2018-06-09 | End: 2018-06-09

## 2018-06-09 RX ORDER — FUROSEMIDE 10 MG/ML
40 INJECTION INTRAMUSCULAR; INTRAVENOUS DAILY
Status: DISCONTINUED | OUTPATIENT
Start: 2018-06-10 | End: 2018-06-09

## 2018-06-09 RX ORDER — PANTOPRAZOLE SODIUM 40 MG/1
40 TABLET, DELAYED RELEASE ORAL
Status: DISCONTINUED | OUTPATIENT
Start: 2018-06-09 | End: 2018-06-11 | Stop reason: HOSPADM

## 2018-06-09 RX ORDER — ISOSORBIDE MONONITRATE 30 MG/1
30 TABLET, EXTENDED RELEASE ORAL DAILY
Status: DISCONTINUED | OUTPATIENT
Start: 2018-06-09 | End: 2018-06-11 | Stop reason: HOSPADM

## 2018-06-09 RX ORDER — FUROSEMIDE 40 MG/1
40 TABLET ORAL DAILY
Status: DISCONTINUED | OUTPATIENT
Start: 2018-06-09 | End: 2018-06-11

## 2018-06-09 RX ADMIN — CARVEDILOL 6.25 MG: 6.25 TABLET, FILM COATED ORAL at 08:23

## 2018-06-09 RX ADMIN — FUROSEMIDE 40 MG: 10 INJECTION, SOLUTION INTRAMUSCULAR; INTRAVENOUS at 03:43

## 2018-06-09 RX ADMIN — TICAGRELOR 90 MG: 90 TABLET ORAL at 17:52

## 2018-06-09 RX ADMIN — OXYCODONE AND ACETAMINOPHEN 1 TABLET: 5; 325 TABLET ORAL at 17:52

## 2018-06-09 RX ADMIN — ISOSORBIDE MONONITRATE 30 MG: 30 TABLET, EXTENDED RELEASE ORAL at 03:43

## 2018-06-09 RX ADMIN — OXYCODONE AND ACETAMINOPHEN 1 TABLET: 5; 325 TABLET ORAL at 08:36

## 2018-06-09 RX ADMIN — PANTOPRAZOLE SODIUM 40 MG: 40 TABLET, DELAYED RELEASE ORAL at 08:23

## 2018-06-09 RX ADMIN — ATORVASTATIN CALCIUM 40 MG: 40 TABLET, FILM COATED ORAL at 21:52

## 2018-06-09 RX ADMIN — FUROSEMIDE 40 MG: 40 TABLET ORAL at 08:55

## 2018-06-09 RX ADMIN — TICAGRELOR 90 MG: 90 TABLET ORAL at 08:23

## 2018-06-09 RX ADMIN — ASPIRIN 81 MG CHEWABLE TABLET 81 MG: 81 TABLET CHEWABLE at 08:23

## 2018-06-09 NOTE — CONSULTS
[facility] - Consult Note    Consultation request by Leatha Wilson MD for advice/opinion related to evaluating Pulmonary edema    Date of  Admission: 6/8/2018  6:17 PM   Primary Care Physician:  Joi Rangel MD     The patient was seen, examined, and independently evaluated and I agree with the below assessment and plan by Nam Woo PA-C with the following comments. This lady is doing much better this morning and her shortness of breath much improved, but not quite baseline. Would watch daily weights and would continue Coreg and add Entresto at low dose as long as renal function does not worsen possibly in the AM if she appears close to euvolemic and put on low dose Lasix on discharge.      Assessment:     Patient Active Problem List   Diagnosis Code    Cardiomyopathy, dilated ([facility] I42.0    Environmental allergies Z91.09    Diabetes mellitus type 2, insulin dependent ([facility] E11.9, Z79.4    COPD (chronic obstructive pulmonary disease) ([facility]) J44.9    Essential hypertension I10    Iron deficiency anemia D50.9    Decreased GFR R94.4    Dyslipidemia, goal LDL below 70 E78.5    Former smoker Z87.891    Osteoarthritis of both knees M17.0    Chronic pain of both knees M25.561, M25.562, G89.29    Chronic pain syndrome G89.4    Primary osteoarthritis of both knees M17.0    Chronic obstructive pulmonary disease ([facility]) J44.9    Compliance with medication regimen Z91.89    Bilateral shoulder pain M25.511, M25.512    Elevated alkaline phosphatase level R74.8    Caregiver stress Z63.6    STEMI (ST elevation myocardial infarction) ([facility]) I21.3    Acute pulmonary edema ([facility]) J81.0    Coronary artery disease involving native coronary artery of native heart without angina pectoris I25.10    Pulmonary edema J81.1    Chest pain R07.9    SOB (shortness of breath) R06.02    Coronary artery disease involving native coronary artery with unstable angina pectoris ([facility]) I25.110 -HFrEF exacerbation- she is now nearing her baseline and feeling better since admission. The 4 pound weight gain may have volume overloaded her along with elevated pressures. Her BP is now stable. -CAD s/p YADIRA to proximal LAD with RESOLUTE stent 5/20/2018. Acute stent thrombosis shortly after initial PCI on 5/20/2018 requiring emergency intubation for pulmonary edema and repeat cardiac cath and additional PCI/YADIRA to prox LAD  -Ischemic cardiomyopathy.  EF 35% on echo 5/21 with elevated pulmonary pressures at 44mm Hg  -DM Type II  -Dyslipidemia- LDL> 200, on Lipitor 80mg.  -HTN. On ACEI and CCB as outpatient. -COPD  -Tobacco abuse.  1 ppd X 50 years prior to admission     Plan:     -Stable and improving.  -She has been able to ambulate the floors without any difficulty and is nearing her baseline.  -Would continue to diurese but will switch to oral Lasix and monitor.  -Her troponin's are flat at 0.15 and no new changes to EKG's since last cath. Do not suspect ACS. -Do not feel any need for any new additional testing at this time. Her last echo was after cath in May without any significant change, and it is likely her exacerbation is related to her weight gain and elevated pressures.  -Patient can go to stepdown and hopefully will improve for home tomorrow.  -Continue with ASA, statin, BB, Brilinta and Lasix. Will watch renal function and will consider add Entresto in 1-2 days  -Will follow. History of Present Illness: This is a 74 y.o. female admitted for Pulmonary edema. Patient complains of:  Patient presented to the emergency department with shortness of breath that woke her up 2 nights ago. She was found to be in heart failure with elevated BP's, and cardiology was asked to see and participate in her care. The patient states that she was doing well since her last admission on 5/18/2018, and she has been adherent to her therapy.  She had been weighing herself at home and noticed a 4 pound gain. She is now feeling well and is near her baseline. She denies any chest pain or other symptoms. No nausea, vomiting or other symptoms      Cardiac risk factors: smoking/ tobacco exposure, dyslipidemia, obesity, sedentary life style, hypertension, post-menopausal      Review of Symptoms:  Except as stated above include:  Constitutional:  negative  Respiratory:  negative  Cardiovascular:  negative  Gastrointestinal: negative  Genitourinary:  negative  Musculoskeletal:  Negative  Neurological:  Negative  Dermatological:  Negative  Endocrinological: Negative  Psychological:  Negative    Pertinent items are noted in HPI. Past Medical History:     Past Medical History:   Diagnosis Date    Abnormal WBC count 5/17/2016    Bilateral shoulder pain 9/27/2016    Chondromalacia of both patellae     Chronic lung disease     Chronic obstructive pulmonary disease ([facility] 5/17/2016    COPD (chronic obstructive pulmonary disease) ([facility] 9/2015    mild-mod dz; Dr Macho Wu    Diabetes [facility]) 2013    Diabetes mellitus ([facility]     [facility]    Dilated cardiomyopathy ([facility]     Dyslipidemia     History of echocardiogram 11/14/2014    Mild LVE. EF 40%. Mild, diffuse hypk. Mild LAE.   Mild MR.      Hypercholesteremia 1/08/14    Hypertension 2000    Noncompliance with medications 2/16/2016    Obesity     Orthostatic hypotension 5/17/2016    Osteoarthritis of both knees     Pain management 04/01/2016    Dr. Josefina Colin Popliteal cyst, bilateral      Vitamin D deficiency 10/16/14         Social History:     Social History     Social History    Marital status: LEGALLY      Spouse name: N/A    Number of children: 3    Years of education: N/A     Occupational History    retired      Social History Main Topics    Smoking status: Former Smoker     Packs/day: 0.25     Years: 48.00     Types: Cigarettes     Quit date: 5/20/2018    Smokeless tobacco: Never Used    Alcohol use No    Drug use: No    Sexual activity: Yes     Other Topics Concern     Service No    Blood Transfusions No    Caffeine Concern No    Occupational Exposure No    Hobby Hazards No    Sleep Concern No    Stress Concern No    Weight Concern No    Special Diet No    Back Care No    Exercise No    Bike Helmet No    Seat Belt Yes    Self-Exams Yes     Social History Narrative        Family History:     Family History   Problem Relation Age of Onset    Diabetes Mother     Hypertension Mother     Hypertension Father     Kidney Disease Maternal Aunt 50     Dialysis        Medications:   No Known Allergies     Current Facility-Administered Medications   Medication Dose Route Frequency    isosorbide mononitrate ER (IMDUR) tablet 30 mg  30 mg Oral DAILY    pantoprazole (PROTONIX) tablet 40 mg  40 mg Oral ACB    [START ON 6/10/2018] furosemide (LASIX) injection 40 mg  40 mg IntraVENous DAILY    carvedilol (COREG) tablet 6.25 mg  6.25 mg Oral Q12H    aspirin chewable tablet 81 mg  81 mg Oral DAILY    atorvastatin (LIPITOR) tablet 40 mg  40 mg Oral QHS    ticagrelor (BRILINTA) tablet 90 mg  90 mg Oral BID    acetaminophen (TYLENOL) tablet 650 mg  650 mg Oral Q6H PRN    oxyCODONE-acetaminophen (PERCOCET) 5-325 mg per tablet 1 Tab  1 Tab Oral Q6H PRN    naloxone (NARCAN) injection 0.4 mg  0.4 mg IntraVENous PRN    ondansetron (ZOFRAN) injection 4 mg  4 mg IntraVENous Q6H PRN    docusate sodium (COLACE) capsule 100 mg  100 mg Oral BID PRN    albuterol-ipratropium (DUO-NEB) 2.5 MG-0.5 MG/3 ML  3 mL Nebulization Q6H PRN         Physical Exam:     Visit Vitals    /74    Pulse 91    Temp 98.2 °F (36.8 °C)    Resp 15    Ht 5' 4\" (1.626 m)    Wt 97.1 kg (214 lb)    SpO2 93%    BMI 36.73 kg/m2     BP Readings from Last 3 Encounters:   06/09/18 115/74   05/31/18 120/67   05/23/18 139/69     Pulse Readings from Last 3 Encounters:   06/09/18 91   05/31/18 88   05/23/18 98     Wt Readings from Last 3 Encounters:   06/08/18 97.1 kg (214 lb)   05/31/18 96.2 kg (212 lb)   05/22/18 96.6 kg (213 lb)       General:  alert, cooperative, no distress, appears stated age  Neck:  nontender, no carotid bruit, no JVD  Lungs:  clear to auscultation bilaterally  Heart:  regular rate and rhythm, S1, S2 normal, no murmur, click, rub or gallop  Abdomen:  abdomen is soft without significant tenderness, masses, organomegaly or guarding  Extremities:  extremities normal, atraumatic, no cyanosis or edema  Skin: Warm and dry. no hyperpigmentation, vitiligo, or suspicious lesions  Neuro: alert, oriented x3, affect appropriate, no focal neurological deficits, moves all extremities well, no involuntary movements  Psych: non focal     Data Review:     Recent Labs      06/08/18   1830   WBC  10.8   HGB  10.9*   HCT  34.4*   PLT  399     Recent Labs      06/08/18   1830   NA  140   K  4.4   CL  109*   CO2  23   GLU  109*   BUN  33*   CREA  1.60*   CA  8.5   MG  2.0   ALB  3.2*   SGOT  12*   ALT  28       Results for orders placed or performed during the hospital encounter of 06/08/18   EKG, 12 LEAD, INITIAL   Result Value Ref Range    Ventricular Rate 94 BPM    Atrial Rate 94 BPM    P-R Interval 154 ms    QRS Duration 80 ms    Q-T Interval 352 ms    QTC Calculation (Bezet) 440 ms    Calculated P Axis 63 degrees    Calculated R Axis 55 degrees    Calculated T Axis 126 degrees    Diagnosis       Normal sinus rhythm  Lateral infarct (cited on or before 20-MAY-2018)  Abnormal ECG  When compared with ECG of 22-MAY-2018 13:40,  No significant change was found     Results for orders placed or performed in visit on 12/31/14   AMB POC EKG ROUTINE W/ 12 LEADS, INTER & REP    Impression    See progress note.        All Cardiac Markers in the last 24 hours:    Lab Results   Component Value Date/Time     06/09/2018 07:15 AM     06/09/2018 12:46 AM     06/08/2018 06:30 PM    CKMB 1.5 06/09/2018 07:15 AM    CKMB 1.5 06/09/2018 12:46 AM    CKMB 1.5 06/08/2018 06:30 PM    CKND1 0.8 06/09/2018 07:15 AM    CKND1 0.9 06/09/2018 12:46 AM    CKND1 0.9 06/08/2018 06:30 PM    TROIQ 0.12 (H) 06/09/2018 07:15 AM    TROIQ 0.15 (H) 06/09/2018 12:46 AM    TROIQ 0.15 (H) 06/08/2018 06:30 PM       Last Lipid:    Lab Results   Component Value Date/Time    Cholesterol, total 291 (H) 05/21/2018 06:23 AM    HDL Cholesterol 52 05/21/2018 06:23 AM    LDL, calculated 212.6 (H) 05/21/2018 06:23 AM    Triglyceride 132 05/21/2018 06:23 AM    CHOL/HDL Ratio 5.6 (H) 05/21/2018 06:23 AM       Signed By: LORE Campa     June 9, 2018

## 2018-06-09 NOTE — PROGRESS NOTES
0730 Bedside and Verbal shift change report given to Katherine DON (oncoming nurse) by Timmy Ling RN   (offgoing nurse). Report included the following information SBAR, Kardex, Intake/Output, MAR and Alarm Parameters .

## 2018-06-09 NOTE — H&P
History & Physical    Patient: Lindsay Wright MRN: [id number]  CSN: [id number]    YOB: 1950  Age: 74 y.o. Sex: female      DOA: 6/8/2018    Chief Complaint:   Chief Complaint   Patient presents with    Shortness of Breath          HPI:     Lindsay Wright is a 74 y.o.  female who has PMH recent admission 2 ry to Code STEMI s/p LAD stenting followed by brief PEA and respiratory failure that required intubation and her EKG showed another ST elevation in infero-lateral leads . Pt had another cath an hour later where she  Was found with occluded stent and received 2 more stents. Pt self extubated the next day, felt better and was discharged on ASA , brilinta and BB   Pt returned to ER today with Lt side chest burning sensation and respiratory distress. Found to have pulmonary edema and her BP was elevated. Pt was also found to have mild elevation in troponin    Pt was started on lasix and gradually started to feel better. HR was elevated on ambulation up to 120 but goes down to 90's after  Pt received CT chest to r/o lung disease ew/out contrast for kidney failure but came back -ve        Past Medical History:   Diagnosis Date    Abnormal WBC count 5/17/2016    Bilateral shoulder pain 9/27/2016    Chondromalacia of both patellae     Chronic lung disease     Chronic obstructive pulmonary disease ([address] 5/17/2016    COPD (chronic obstructive pulmonary disease) ([facility] 9/2015    mild-mod dz; Dr Deirdre Wagoner    Diabetes [facility]) 2013    Diabetes mellitus ([address]     Diabetic eye exam ([facility] 2016    Dilated cardiomyopathy ([facility]     Dyslipidemia     History of echocardiogram 11/14/2014    Mild LVE. EF 40%. Mild, diffuse hypk. Mild LAE.   Mild MR.      Hypercholesteremia 1/08/14    Hypertension 2000    Noncompliance with medications 2/16/2016    Obesity     Orthostatic hypotension 5/17/2016    Osteoarthritis of both knees     Pain management 04/01/2016    Dr. Gifty Funk Popliteal cyst, bilateral      Vitamin D deficiency 10/16/14       No past surgical history on file. Family History   Problem Relation Age of Onset    Diabetes Mother     Hypertension Mother     Hypertension Father     Kidney Disease Maternal Aunt 48     Dialysis       Social History     Social History    Marital status: LEGALLY      Spouse name: N/A    Number of children: 3    Years of education: N/A     Occupational History    retired      Social History Main Topics    Smoking status: Former Smoker     Packs/day: 0.25     Years: 48.00     Types: Cigarettes     Quit date: 5/20/2018    Smokeless tobacco: Never Used    Alcohol use No    Drug use: No    Sexual activity: Yes     Other Topics Concern     Service No    Blood Transfusions No    Caffeine Concern No    Occupational Exposure No    Hobby Hazards No    Sleep Concern No    Stress Concern No    Weight Concern No    Special Diet No    Back Care No    Exercise No    Bike Helmet No    Seat Belt Yes    Self-Exams Yes     Social History Narrative       Prior to Admission medications    Medication Sig Start Date End Date Taking? Authorizing Provider   carvedilol (COREG) 6.25 mg tablet Take 1 Tab by mouth two (2) times daily (with meals). 5/23/18   Domitila Yoder MD   atorvastatin (LIPITOR) 20 mg tablet Take 1 Tab by mouth nightly. 5/23/18   Domitila Yoder MD   aspirin 81 mg chewable tablet Take 1 Tab by mouth daily. 5/24/18   Domitila Yoder MD   ticagrelor (BRILINTA) 90 mg tablet Take 1 Tab by mouth two (2) times a day. 5/23/18   Domitila Yoder MD   oxyCODONE-acetaminophen (PERCOCET) 5-325 mg per tablet Take 1 Tab by mouth every six (6) hours as needed. Max Daily Amount: 4 Tabs.  5/23/18   Domitila Yoder MD   insulin detemir (LEVEMIR) 100 unit/mL injection 15 units daily for diabetes 7/20/17   Yamini Allen NP   albuterol (PROVENTIL HFA, VENTOLIN HFA, PROAIR HFA) 90 mcg/actuation inhaler Take 2 Puffs by inhalation every four (4) hours as needed for Wheezing. 7/20/17   Zhane Nesbitt NP   omega-3 acid ethyl esters (LOVAZA) 1 gram capsule Take 2 Caps by mouth daily (with breakfast). 7/20/17   Zhane Nesbitt NP   magnesium oxide (MAG-OX) 400 mg tablet Take 1 Tab by mouth daily. For low magnesium 4/26/17   Marlys Irizarry NP   desloratadine (CLARINEX) 5 mg tablet Take 1 Tab by mouth daily as needed for Allergies. 4/26/17   Zhane Nesbitt NP   mometasone (NASONEX) 50 mcg/actuation nasal spray 2 Sprays by Both Nostrils route daily as needed. For allergies 4/26/17   Marlys Nataliia NorthernSONJA   umeclidinium-vilanterol [facility] ELLIP) 62.5-25 mcg/actuation inhaler Take 1 Puff by inhalation daily. For COPD 4/26/17   Marlys Irizarry NP   esomeprazole (NEXIUM) 40 mg capsule Take 1 Cap by mouth daily as needed. For reflux 4/26/17   Zhane Nesbitt NP   albuterol (PROVENTIL VENTOLIN) 2.5 mg /3 mL (0.083 %) nebulizer solution 3 mL by Nebulization route every four (4) hours as needed for Wheezing. 9/27/16   Zhane Nesbitt NP   ferrous sulfate 325 mg (65 mg iron) tablet Take 1 Tab by mouth two (2) times a day. Indications: IRON DEFICIENCY ANEMIA 10/22/15   Zhane Nesbitt NP       No Known Allergies      Review of Systems  GENERAL: Patient alert, awake and oriented times 3, unable to communicate full sentences , n distress. HEENT: No change in vision, no earache, tinnitus, sore throat or sinus congestion. NECK: No pain or stiffness. PULMONARY: +ve shortness of breath, no cough or wheeze. Cardiovascular: +ve pnd / orthopnea, +ve CP>> burning   GASTROINTESTINAL: No abdominal pain, nausea, vomiting or diarrhea, melena or bright red blood per rectum. GENITOURINARY: No urinary frequency, urgency, hesitancy or dysuria. MUSCULOSKELETAL: No joint or muscle pain, no back pain, no recent trauma. DERMATOLOGIC: No rash, no itching, no lesions. ENDOCRINE: No polyuria, polydipsia, no heat or cold intolerance.  No recent change in weight. HEMATOLOGICAL: No anemia or easy bruising or bleeding. NEUROLOGIC: No headache, seizures, numbness, tingling or weakness. Physical Exam:     Physical Exam:  Visit Vitals    /86    Pulse 84    Temp 100 °F (37.8 °C)    Resp 23    Ht 5' 4\" (1.626 m)    Wt 97.1 kg (214 lb)    SpO2 99%    BMI 36.73 kg/m2      O2 Device: Room air    Temp (24hrs), Av °F (37.8 °C), Min:100 °F (37.8 °C), Max:100 °F (37.8 °C)     190 -  0700  In: -   Out: 400 [Urine:400]        General:  Alert, cooperative, in distress, appears stated age. Head: Normocephalic, without obvious abnormality, atraumatic. Eyes:  Conjunctivae/corneas clear. PERRL, EOMs intact. Nose: Nares normal. No drainage or sinus tenderness. Neck: Supple, symmetrical, trachea midline, no adenopathy, thyroid: no enlargement, no carotid bruit and no JVD. Lungs:   Decrease BS B/L with +ve Crackles no wheezing    Heart:  Regular rate and rhythm, S1, S2 +ve murmur     Abdomen: Soft, non-tender. Bowel sounds normal.    Extremities: Extremities normal, atraumatic, no cyanosis or edema. Pulses: 2+ and symmetric all extremities. Skin:  No rashes or lesions   Neurologic: AAOx3, No focal motor or sensory deficit. Labs Reviewed: All lab results for the last 24 hours reviewed.   CXR and EKG    Procedures/imaging: see electronic medical records for all procedures/Xrays and details which were not copied into this note but were reviewed prior to creation of Plan      Assessment/Plan     Principal Problem:    Coronary artery disease involving native coronary artery with unstable angina pectoris [facility] (2018)    Active Problems:    COPD (chronic obstructive pulmonary disease) ([facility] (2015)      Overview: mild-mod dz; Dr Joseph Craig      Pulmonary edema (2018)      Chest pain (2018)      SOB (shortness of breath) (2018)       Pt will be admitted to step down  for unstable angina with Chest pain and elevated troponin and BNP  No acute changes on EKG  S/p 2 cardiac caths and 3 stents placement 5/20/18  S/p Intubation for Acute respiratory failure 2 ry to pulmonary edema s/p her first cardiac Cath   Pulmonary edema on CXR with elevated BNP >> could be 2 ry to uncontrolled BP or tachycardia.   Pt developed a-fib briefly in her last admission    CT chest w/out contrast ordered and -ve   Case D/W cardiology Dr Sydnie Iniguez   Cardiac panel x 3  Hold off on anticoagulation for now   Repeat EKG in AM  Lasix 40 BID till AM then daily   Echo in am   Continue ASA, Brilinta , BB   Increased Statin to 40 mg  Adding  Imdur     DVT/GI Prophylaxis: Lovenox    Plan of care is discussed in details with Patient/Family at bedside and agreed upon    John Marr MD  6/9/2018 11:16 PM

## 2018-06-09 NOTE — ROUTINE PROCESS
TRANSFER - OUT REPORT:    Verbal report given to Kenyatta Nelson  being transferred to CVT ICU(unit) for routine progression of care       Report consisted of patients Situation, Background, Assessment and   Recommendations(SBAR). Information from the following report(s) SBAR, ED Summary and MAR was reviewed with the receiving nurse. Lines:   Peripheral IV 06/08/18 Left Hand (Active)   Site Assessment Clean, dry, & intact 6/8/2018  6:38 PM   Phlebitis Assessment 0 6/8/2018  6:38 PM   Infiltration Assessment 0 6/8/2018  6:38 PM   Dressing Status Clean, dry, & intact 6/8/2018  6:38 PM   Dressing Type Transparent 6/8/2018  6:38 PM   Hub Color/Line Status Green;Flushed;Patent 6/8/2018  6:38 PM   Action Taken Blood drawn 6/8/2018  6:38 PM        Opportunity for questions and clarification was provided.       Patient transported with:   Monitor  O2 @ 2 liters

## 2018-06-09 NOTE — PROGRESS NOTES
[facility] Hospitalist Group  Progress Note    Patient: Shirley Hdz Age: 74 y.o. : 1950 MR#: [id number] SSN: [id number]  Date/Time: 2018     Subjective:     Review of systems    No CP   NO NVD  No SOB  NO Cough     Assessment/Plan:   1. Acute on chronic systolic HF ( EF 39%)   2 CAD with recent intervention   3 morbid obesity - Body mass index is 35.05 kg/(m^2). 4 HLD   5 DM2      PLAN   - Improved respiratory status with anti failure treatments   - Follow with Cardiology   - Continue SSI   - D/W patient and family in room with her         Case discussed with:  [x]Patient  [x]Family  [x]Nursing  []Case Management  DVT Prophylaxis:  []Lovenox  []Hep SQ  []SCDs  []Coumadin   []On Heparin gtt          Objective:   VS:   Visit Vitals    BP 96/58    Pulse 88    Temp 98.2 °F (36.8 °C)    Resp 20    Ht 5' 4\" (1.626 m)    Wt 92.6 kg (204 lb 3.2 oz)    SpO2 100%    BMI 35.05 kg/m2      Tmax/24hrs: Temp (24hrs), Av.7 °F (37.1 °C), Min:98.2 °F (36.8 °C), Max:100 °F (37.8 °C)  IOBRIEF  Intake/Output Summary (Last 24 hours) at 18 1550  Last data filed at 18 1234   Gross per 24 hour   Intake              360 ml   Output             1050 ml   Net             -690 ml       General:  Alert, cooperative, no acute distress   HEENT:  NC, Atraumatic. PERRLA, anicteric sclerae. Pulmonary:  Bilateral air entry present . No Wheezing/Rhonchi/Rales. Cardiovascular: Regular rate and Rhythm. GI:  Soft, Non distended, Non tender. + Bowel sounds. Extremities:  No edema, cyanosis, clubbing. No calf tenderness. Psych:  Not anxious or agitated. Neurologic: Grossly - Motor and Sensory functions are intact .       Medications:   Current Facility-Administered Medications   Medication Dose Route Frequency    isosorbide mononitrate ER (IMDUR) tablet 30 mg  30 mg Oral DAILY    pantoprazole (PROTONIX) tablet 40 mg  40 mg Oral ACB    carvedilol (COREG) tablet 6.25 mg  6.25 mg Oral Q12H    furosemide (LASIX) tablet 40 mg  40 mg Oral DAILY    aspirin chewable tablet 81 mg  81 mg Oral DAILY    atorvastatin (LIPITOR) tablet 40 mg  40 mg Oral QHS    ticagrelor (BRILINTA) tablet 90 mg  90 mg Oral BID    acetaminophen (TYLENOL) tablet 650 mg  650 mg Oral Q6H PRN    oxyCODONE-acetaminophen (PERCOCET) 5-325 mg per tablet 1 Tab  1 Tab Oral Q6H PRN    naloxone (NARCAN) injection 0.4 mg  0.4 mg IntraVENous PRN    ondansetron (ZOFRAN) injection 4 mg  4 mg IntraVENous Q6H PRN    docusate sodium (COLACE) capsule 100 mg  100 mg Oral BID PRN    albuterol-ipratropium (DUO-NEB) 2.5 MG-0.5 MG/3 ML  3 mL Nebulization Q6H PRN       Labs:    Recent Labs      06/09/18   0828  06/08/18   1830   WBC  10.3  10.8   HGB  12.4  10.9*   HCT  37.7  34.4*   PLT  409  399     Recent Labs      06/09/18   0828  06/08/18   1830   NA  139  140   K  4.1  4.4   CL  103  109*   CO2  23  23   GLU  127*  109*   BUN  35*  33*   CREA  1.64*  1.60*   CA  9.3  8.5   MG  1.8  2.0   PHOS  5.0*   --    ALB   --   3.2*   SGOT   --   12*   ALT   --   28         Signed By: Deanie Bamberger, MD     June 9, 2018

## 2018-06-10 LAB
ANION GAP SERPL CALC-SCNC: 10 MMOL/L (ref 3–18)
ATRIAL RATE: 88 BPM
ATRIAL RATE: 94 BPM
BASOPHILS # BLD: 0 K/UL (ref 0–0.1)
BASOPHILS NFR BLD: 0 % (ref 0–2)
BUN SERPL-MCNC: 52 MG/DL (ref 7–18)
BUN/CREAT SERPL: 24 (ref 12–20)
CALCIUM SERPL-MCNC: 8.3 MG/DL (ref 8.5–10.1)
CALCULATED P AXIS, ECG09: 58 DEGREES
CALCULATED P AXIS, ECG09: 63 DEGREES
CALCULATED R AXIS, ECG10: 38 DEGREES
CALCULATED R AXIS, ECG10: 55 DEGREES
CALCULATED T AXIS, ECG11: 118 DEGREES
CALCULATED T AXIS, ECG11: 126 DEGREES
CHLORIDE SERPL-SCNC: 102 MMOL/L (ref 100–108)
CO2 SERPL-SCNC: 25 MMOL/L (ref 21–32)
CREAT SERPL-MCNC: 2.13 MG/DL (ref 0.6–1.3)
DIAGNOSIS, 93000: NORMAL
DIAGNOSIS, 93000: NORMAL
DIFFERENTIAL METHOD BLD: ABNORMAL
EOSINOPHIL # BLD: 0.2 K/UL (ref 0–0.4)
EOSINOPHIL NFR BLD: 2 % (ref 0–5)
ERYTHROCYTE [DISTWIDTH] IN BLOOD BY AUTOMATED COUNT: 16 % (ref 11.6–14.5)
EST. AVERAGE GLUCOSE BLD GHB EST-MCNC: 154 MG/DL
GLUCOSE BLD STRIP.AUTO-MCNC: 143 MG/DL (ref 70–110)
GLUCOSE BLD STRIP.AUTO-MCNC: 149 MG/DL (ref 70–110)
GLUCOSE BLD STRIP.AUTO-MCNC: 182 MG/DL (ref 70–110)
GLUCOSE SERPL-MCNC: 113 MG/DL (ref 74–99)
HBA1C MFR BLD: 7 % (ref 4.2–5.6)
HCT VFR BLD AUTO: 35.1 % (ref 35–45)
HGB BLD-MCNC: 11.7 G/DL (ref 12–16)
LYMPHOCYTES # BLD: 3.3 K/UL (ref 0.9–3.6)
LYMPHOCYTES NFR BLD: 33 % (ref 21–52)
MCH RBC QN AUTO: 27.9 PG (ref 24–34)
MCHC RBC AUTO-ENTMCNC: 33.3 G/DL (ref 31–37)
MCV RBC AUTO: 83.8 FL (ref 74–97)
MONOCYTES # BLD: 0.6 K/UL (ref 0.05–1.2)
MONOCYTES NFR BLD: 6 % (ref 3–10)
NEUTS SEG # BLD: 6.1 K/UL (ref 1.8–8)
NEUTS SEG NFR BLD: 59 % (ref 40–73)
P-R INTERVAL, ECG05: 154 MS
P-R INTERVAL, ECG05: 156 MS
PLATELET # BLD AUTO: 370 K/UL (ref 135–420)
PMV BLD AUTO: 9.2 FL (ref 9.2–11.8)
POTASSIUM SERPL-SCNC: 3.9 MMOL/L (ref 3.5–5.5)
Q-T INTERVAL, ECG07: 352 MS
Q-T INTERVAL, ECG07: 378 MS
QRS DURATION, ECG06: 78 MS
QRS DURATION, ECG06: 80 MS
QTC CALCULATION (BEZET), ECG08: 440 MS
QTC CALCULATION (BEZET), ECG08: 457 MS
RBC # BLD AUTO: 4.19 M/UL (ref 4.2–5.3)
SODIUM SERPL-SCNC: 137 MMOL/L (ref 136–145)
VENTRICULAR RATE, ECG03: 88 BPM
VENTRICULAR RATE, ECG03: 94 BPM
WBC # BLD AUTO: 10.2 K/UL (ref 4.6–13.2)

## 2018-06-10 PROCEDURE — 82962 GLUCOSE BLOOD TEST: CPT

## 2018-06-10 PROCEDURE — 36415 COLL VENOUS BLD VENIPUNCTURE: CPT | Performed by: INTERNAL MEDICINE

## 2018-06-10 PROCEDURE — 85025 COMPLETE CBC W/AUTO DIFF WBC: CPT | Performed by: INTERNAL MEDICINE

## 2018-06-10 PROCEDURE — 74011250637 HC RX REV CODE- 250/637: Performed by: PHYSICIAN ASSISTANT

## 2018-06-10 PROCEDURE — 74011636637 HC RX REV CODE- 636/637: Performed by: INTERNAL MEDICINE

## 2018-06-10 PROCEDURE — 83036 HEMOGLOBIN GLYCOSYLATED A1C: CPT | Performed by: INTERNAL MEDICINE

## 2018-06-10 PROCEDURE — 80048 BASIC METABOLIC PNL TOTAL CA: CPT | Performed by: INTERNAL MEDICINE

## 2018-06-10 PROCEDURE — 74011250637 HC RX REV CODE- 250/637: Performed by: INTERNAL MEDICINE

## 2018-06-10 PROCEDURE — 65660000004 HC RM CVT STEPDOWN

## 2018-06-10 RX ORDER — CYCLOBENZAPRINE HCL 5 MG
5 TABLET ORAL
Status: DISCONTINUED | OUTPATIENT
Start: 2018-06-10 | End: 2018-06-11 | Stop reason: HOSPADM

## 2018-06-10 RX ORDER — DEXTROSE 50 % IN WATER (D50W) INTRAVENOUS SYRINGE
25-50 AS NEEDED
Status: DISCONTINUED | OUTPATIENT
Start: 2018-06-10 | End: 2018-06-11 | Stop reason: HOSPADM

## 2018-06-10 RX ORDER — INSULIN LISPRO 100 [IU]/ML
INJECTION, SOLUTION INTRAVENOUS; SUBCUTANEOUS
Status: DISCONTINUED | OUTPATIENT
Start: 2018-06-10 | End: 2018-06-11 | Stop reason: HOSPADM

## 2018-06-10 RX ORDER — MAGNESIUM SULFATE 100 %
4 CRYSTALS MISCELLANEOUS AS NEEDED
Status: DISCONTINUED | OUTPATIENT
Start: 2018-06-10 | End: 2018-06-11 | Stop reason: HOSPADM

## 2018-06-10 RX ADMIN — OXYCODONE AND ACETAMINOPHEN 1 TABLET: 5; 325 TABLET ORAL at 09:58

## 2018-06-10 RX ADMIN — ISOSORBIDE MONONITRATE 30 MG: 30 TABLET, EXTENDED RELEASE ORAL at 09:52

## 2018-06-10 RX ADMIN — OXYCODONE AND ACETAMINOPHEN 1 TABLET: 5; 325 TABLET ORAL at 21:33

## 2018-06-10 RX ADMIN — ASPIRIN 81 MG CHEWABLE TABLET 81 MG: 81 TABLET CHEWABLE at 09:52

## 2018-06-10 RX ADMIN — CARVEDILOL 6.25 MG: 6.25 TABLET, FILM COATED ORAL at 21:30

## 2018-06-10 RX ADMIN — TICAGRELOR 90 MG: 90 TABLET ORAL at 09:53

## 2018-06-10 RX ADMIN — CYCLOBENZAPRINE HYDROCHLORIDE 5 MG: 5 TABLET, FILM COATED ORAL at 22:58

## 2018-06-10 RX ADMIN — ATORVASTATIN CALCIUM 40 MG: 40 TABLET, FILM COATED ORAL at 21:30

## 2018-06-10 RX ADMIN — TICAGRELOR 90 MG: 90 TABLET ORAL at 17:35

## 2018-06-10 RX ADMIN — OXYCODONE AND ACETAMINOPHEN 1 TABLET: 5; 325 TABLET ORAL at 15:52

## 2018-06-10 RX ADMIN — PANTOPRAZOLE SODIUM 40 MG: 40 TABLET, DELAYED RELEASE ORAL at 06:52

## 2018-06-10 RX ADMIN — INSULIN LISPRO 2 UNITS: 100 INJECTION, SOLUTION INTRAVENOUS; SUBCUTANEOUS at 22:58

## 2018-06-10 RX ADMIN — FUROSEMIDE 40 MG: 40 TABLET ORAL at 09:53

## 2018-06-10 NOTE — PROGRESS NOTES
[facility] Hospitalist Group  Progress Note    Patient: Wen Pascual Age: 74 y.o. : 1950 MR#: [id number] SSN: [id number]  Date/Time: 6/10/2018     Subjective:     Review of systems    No CP   NO NVD  No SOB  NO Cough     Assessment/Plan:   1. Acute on chronic systolic HF ( EF 57%)   2 CAD with recent intervention   3 morbid obesity - Body mass index is 34.78 kg/(m^2). 4 HLD   5 DM2      PLAN   - Stable   - Improved respi status   - worsening renal dysfunction - change IV lasix to po   - Monitor renal function   - Entresto on hold due to renal dysfunction - ? Start as out patient when renal function is acceptable - defer to Cardiology      Case discussed with:  [x]Patient  [x]Family  [x]Nursing  []Case Management  DVT Prophylaxis:  []Lovenox  []Hep SQ  []SCDs  []Coumadin   []On Heparin gtt          Objective:   VS:   Visit Vitals    /69    Pulse (!) 101    Temp 98.1 °F (36.7 °C)    Resp 20    Ht 5' 4\" (1.626 m)    Wt 91.9 kg (202 lb 9.6 oz)    SpO2 98%    BMI 34.78 kg/m2      Tmax/24hrs: Temp (24hrs), Av.1 °F (36.7 °C), Min:97.5 °F (36.4 °C), Max:98.3 °F (36.8 °C)  IOBRIEF    Intake/Output Summary (Last 24 hours) at 06/10/18 1411  Last data filed at 06/10/18 1300   Gross per 24 hour   Intake              600 ml   Output             1550 ml   Net             -950 ml       General:  Alert, cooperative, no acute distress   HEENT:  NC, Atraumatic. PERRLA, anicteric sclerae. Pulmonary:  Bilateral air entry present . No Wheezing/Rhonchi/Rales. Cardiovascular: Regular rate and Rhythm. GI:  Soft, Non distended, Non tender. + Bowel sounds. Extremities:  No edema, cyanosis, clubbing. No calf tenderness. Psych:  Not anxious or agitated. Neurologic: Grossly - Motor and Sensory functions are intact .       Medications:   Current Facility-Administered Medications   Medication Dose Route Frequency    insulin lispro (HUMALOG) injection   SubCUTAneous AC&HS    glucose chewable tablet 16 g  4 Tab Oral PRN    glucagon (GLUCAGEN) injection 1 mg  1 mg IntraMUSCular PRN    dextrose (D50W) injection syrg 12.5-25 g  25-50 mL IntraVENous PRN    isosorbide mononitrate ER (IMDUR) tablet 30 mg  30 mg Oral DAILY    pantoprazole (PROTONIX) tablet 40 mg  40 mg Oral ACB    carvedilol (COREG) tablet 6.25 mg  6.25 mg Oral Q12H    furosemide (LASIX) tablet 40 mg  40 mg Oral DAILY    aspirin chewable tablet 81 mg  81 mg Oral DAILY    atorvastatin (LIPITOR) tablet 40 mg  40 mg Oral QHS    ticagrelor (BRILINTA) tablet 90 mg  90 mg Oral BID    acetaminophen (TYLENOL) tablet 650 mg  650 mg Oral Q6H PRN    oxyCODONE-acetaminophen (PERCOCET) 5-325 mg per tablet 1 Tab  1 Tab Oral Q6H PRN    naloxone (NARCAN) injection 0.4 mg  0.4 mg IntraVENous PRN    ondansetron (ZOFRAN) injection 4 mg  4 mg IntraVENous Q6H PRN    docusate sodium (COLACE) capsule 100 mg  100 mg Oral BID PRN    albuterol-ipratropium (DUO-NEB) 2.5 MG-0.5 MG/3 ML  3 mL Nebulization Q6H PRN       Labs:    Recent Labs      06/10/18   0058  06/09/18   0828  06/08/18   1830   WBC  10.2  10.3  10.8   HGB  11.7*  12.4  10.9*   HCT  35.1  37.7  34.4*   PLT  370  409  399     Recent Labs      06/10/18   0058  06/09/18   0828  06/08/18   1830   NA  137  139  140   K  3.9  4.1  4.4   CL  102  103  109*   CO2  25  23  23   GLU  113*  127*  109*   BUN  52*  35*  33*   CREA  2.13*  1.64*  1.60*   CA  8.3*  9.3  8.5   MG   --   1.8  2.0   PHOS   --   5.0*   --    ALB   --    --   3.2*   SGOT   --    --   12*   ALT   --    --   28         Signed By: Rhiannon Mills MD     [place] 10, 2018

## 2018-06-10 NOTE — PROGRESS NOTES
[facility]  -  Progress Note      Patient: Lindsay Wright MRN: [id number]  SSN: [id number]    YOB: 1950  Age: 74 y.o. Sex: female      Admit Date: 6/8/2018     The patient was seen, examined, and independently evaluated and I agree with the below assessment and plan by Hamzah Cowan PA-C with the following comments. Significant pre-renal azotemia with increasing creatinine which will hopefully be better since switch to oral Lasix from IV. Once renal function stabilizes would consider starting Entresto in low dose first since LV function suppressed with EF under 40% and recurrent admission for CHF within a month of her last admission. Assessment:     Hospital Problems  Date Reviewed: 5/31/2018          Codes Class Noted POA    Chest pain ICD-10-CM: R07.9  ICD-9-CM: 786.50  6/9/2018 Unknown        SOB (shortness of breath) ICD-10-CM: R06.02  ICD-9-CM: 786.05  6/9/2018 Unknown        * (Principal)Coronary artery disease involving native coronary artery with unstable angina pectoris ([facility] ICD-10-CM: I25.110  ICD-9-CM: 414.01, 411.1  6/9/2018 Unknown        Pulmonary edema ICD-10-CM: J81.1  ICD-9-CM: 111  6/8/2018 Unknown        COPD (chronic obstructive pulmonary disease) ([facility] ICD-10-CM: J44.9  ICD-9-CM: 496  9/1/2015 Yes    Overview Signed 9/11/2015  5:45 PM by Warren Brower NP     mild-mod dz; Dr Deirdre Wagoner                 -HFrEF exacerbation- she is near or at baseline. The 4 pound weight gain may have volume overloaded her along with elevated pressures. Her BP is now stable. -CAD s/p YADIRA to proximal LAD with RESOLUTE stent 5/20/2018. Acute stent thrombosis shortly after initial PCI on 5/20/2018 requiring emergency intubation for pulmonary edema and repeat cardiac cath and additional PCI/YADIRA to prox LAD  -Ischemic cardiomyopathy.  EF 35% on echo 5/21 with elevated pulmonary pressures at 44mm Hg  -DM Type II  -Dyslipidemia- LDL> 200, on Lipitor 80mg.  -HTN.  On ACEI and CCB as outpatient. -COPD  -Tobacco abuse.  1 ppd X 50 years prior to admission       Plan:     Stable and doing well. Her renal function is up to 2.13 and she may not be able to tolerate Entresto at this time. Will discuss with MD regarding course. She is stable and will consider keeping overnight to watch renal function, and if improved, starting Entresto as outpatient. Activity as tolerated. Subjective:     No new complaints. She feels well and no symptoms.     Objective:      Patient Vitals for the past 8 hrs:   Temp Pulse Resp BP SpO2   06/10/18 1039 98.1 °F (36.7 °C) (!) 101 20 102/69 98 %   06/10/18 0714 98.3 °F (36.8 °C) 80 20 108/70 100 %   06/10/18 0400 98.3 °F (36.8 °C) 90 20 116/75 100 %         Patient Vitals for the past 96 hrs:   Weight   06/10/18 0650 91.9 kg (202 lb 9.6 oz)   06/09/18 0920 92.6 kg (204 lb 3.2 oz)   06/08/18 1801 97.1 kg (214 lb)         Intake/Output Summary (Last 24 hours) at 06/10/18 1107  Last data filed at 06/10/18 1044   Gross per 24 hour   Intake              600 ml   Output             1350 ml   Net             -750 ml       Physical Exam:  General:  alert, cooperative, no distress, appears stated age  Neck:  no JVD  Lungs:  clear to auscultation bilaterally  Heart:  regular rate and rhythm, S1, S2 normal, no murmur, click, rub or gallop  Extremities:  extremities normal, atraumatic, no cyanosis or edema    Data Review:     Labs: Results:       Chemistry Recent Labs      06/10/18   0058  06/09/18   0828  06/08/18   1830   GLU  113*  127*  109*   NA  137  139  140   K  3.9  4.1  4.4   CL  102  103  109*   CO2  25  23  23   BUN  52*  35*  33*   CREA  2.13*  1.64*  1.60*   CA  8.3*  9.3  8.5   MG   --   1.8  2.0   PHOS   --   5.0*   --    AGAP  10  13  8   BUCR  24*  21*  21*   AP   --    --   110   TP   --    --   6.7   ALB   --    --   3.2*   GLOB   --    --   3.5   AGRAT   --    --   0.9      CBC w/Diff Recent Labs      06/10/18   0058  06/09/18   1242 06/08/18   1830   WBC  10.2  10.3  10.8   RBC  4.19*  4.43  4.06*   HGB  11.7*  12.4  10.9*   HCT  35.1  37.7  34.4*   PLT  370  409  399   GRANS  59  53  61   LYMPH  33  38  27   EOS  2  1  0      Cardiac Enzymes Lab Results   Component Value Date/Time     06/09/2018 03:00 PM    CKMB 1.3 06/09/2018 03:00 PM    CKND1 0.9 06/09/2018 03:00 PM    TROIQ 0.11 (H) 06/09/2018 03:00 PM      Coagulation No results for input(s): PTP, INR, APTT in the last 72 hours.     No lab exists for component: INREXT    Lipid Panel Lab Results   Component Value Date/Time    Cholesterol, total 291 (H) 05/21/2018 06:23 AM    HDL Cholesterol 52 05/21/2018 06:23 AM    LDL, calculated 212.6 (H) 05/21/2018 06:23 AM    VLDL, calculated 26.4 05/21/2018 06:23 AM    Triglyceride 132 05/21/2018 06:23 AM    CHOL/HDL Ratio 5.6 (H) 05/21/2018 06:23 AM      BNP No results found for: BNP, BNPP, XBNPT   Liver Enzymes Recent Labs      06/08/18   1830   TP  6.7   ALB  3.2*   AP  110   SGOT  12*      Digoxin    Thyroid Studies Lab Results   Component Value Date/Time    TSH 1.01 05/21/2018 06:23 AM          Lyn[place]le [place], DO   [place] 10, 2018

## 2018-06-10 NOTE — PROGRESS NOTES
Pt transferred to t sd by way of ambulation. Pt became dyspneic and requested o2. SpO2 on RA was 98%. O2 at 2lpm via NC was applied and the [facility][1] was elevated. The patient stated that she felt better, O2 remained in place. The patient breath sounds were diminished bilaterally. Will continue to monitor.

## 2018-06-10 NOTE — ROUTINE PROCESS
Bedside and Verbal shift change report given to Katie Buerger, RN  (oncoming nurse) by Inder Mcintyre RN  (offgoing nurse).  Report included the following information SBAR, Kardex, ED Summary, Procedure Summary, Intake/Output, MAR, Recent Results, Med Rec Status and Cardiac Rhythm SR, ST .

## 2018-06-10 NOTE — PROGRESS NOTES
Received bedside report from [facility] [address], RN. Pt Aox4, NC-2liters, FR-1500ml, pt sitting up in bed eating lunch, denies pain or discomfort at this time, 4 eye skin assessment completed. 0950-Noted pt has history of DM, no orders for blood sugar check or sliding scale. Dr. Abigail Bay paged. Per Dr. Abigail Bay I will place orders       1240-Pt sitting up in bed eating lunch. Pt denies pain or discomfort at this time. Pt provided Non-Skid resistant socks and educated on risk of falls. 1735-Pt provided scheduled meds. Pt denies [ain or discomfort at this time. Will continue to monitor pt.        1900-Bedside and Verbal shift change report given to Tita FERRARA (oncoming nurse) by [place] RN(offgoing nurse).  Report included the following information SBAR, Kardex, [facility] and Cardiac Rhythm SR.

## 2018-06-10 NOTE — ROUTINE PROCESS
Bedside and Verbal shift change report given to [place], [place] (oncoming nurse) by Winston De Jesus RN   (offgoing nurse). Report included the following information SBAR, Kardex, MAR, Recent Results and Cardiac Rhythm NSR. Riley Leal

## 2018-06-11 VITALS
SYSTOLIC BLOOD PRESSURE: 106 MMHG | HEIGHT: 64 IN | TEMPERATURE: 97.9 F | WEIGHT: 202.6 LBS | BODY MASS INDEX: 34.59 KG/M2 | RESPIRATION RATE: 16 BRPM | HEART RATE: 101 BPM | DIASTOLIC BLOOD PRESSURE: 71 MMHG | OXYGEN SATURATION: 98 %

## 2018-06-11 LAB
ANION GAP SERPL CALC-SCNC: 10 MMOL/L (ref 3–18)
BASOPHILS # BLD: 0 K/UL (ref 0–0.06)
BASOPHILS NFR BLD: 0 % (ref 0–2)
BUN SERPL-MCNC: 58 MG/DL (ref 7–18)
BUN/CREAT SERPL: 24 (ref 12–20)
CALCIUM SERPL-MCNC: 8.2 MG/DL (ref 8.5–10.1)
CHLORIDE SERPL-SCNC: 102 MMOL/L (ref 100–108)
CO2 SERPL-SCNC: 24 MMOL/L (ref 21–32)
CREAT SERPL-MCNC: 2.37 MG/DL (ref 0.6–1.3)
DIFFERENTIAL METHOD BLD: ABNORMAL
EOSINOPHIL # BLD: 0.2 K/UL (ref 0–0.4)
EOSINOPHIL NFR BLD: 2 % (ref 0–5)
ERYTHROCYTE [DISTWIDTH] IN BLOOD BY AUTOMATED COUNT: 15.9 % (ref 11.6–14.5)
GLUCOSE BLD STRIP.AUTO-MCNC: 109 MG/DL (ref 70–110)
GLUCOSE BLD STRIP.AUTO-MCNC: 115 MG/DL (ref 70–110)
GLUCOSE BLD STRIP.AUTO-MCNC: 134 MG/DL (ref 70–110)
GLUCOSE SERPL-MCNC: 133 MG/DL (ref 74–99)
HCT VFR BLD AUTO: 35.5 % (ref 35–45)
HGB BLD-MCNC: 11.5 G/DL (ref 12–16)
LYMPHOCYTES # BLD: 2.8 K/UL (ref 0.9–3.6)
LYMPHOCYTES NFR BLD: 29 % (ref 21–52)
MCH RBC QN AUTO: 27.8 PG (ref 24–34)
MCHC RBC AUTO-ENTMCNC: 32.4 G/DL (ref 31–37)
MCV RBC AUTO: 85.7 FL (ref 74–97)
MONOCYTES # BLD: 0.7 K/UL (ref 0.05–1.2)
MONOCYTES NFR BLD: 8 % (ref 3–10)
NEUTS SEG # BLD: 5.8 K/UL (ref 1.8–8)
NEUTS SEG NFR BLD: 61 % (ref 40–73)
PLATELET # BLD AUTO: 349 K/UL (ref 135–420)
PMV BLD AUTO: 9.3 FL (ref 9.2–11.8)
POTASSIUM SERPL-SCNC: 4.4 MMOL/L (ref 3.5–5.5)
RBC # BLD AUTO: 4.14 M/UL (ref 4.2–5.3)
SODIUM SERPL-SCNC: 136 MMOL/L (ref 136–145)
WBC # BLD AUTO: 9.5 K/UL (ref 4.6–13.2)

## 2018-06-11 PROCEDURE — 74011250637 HC RX REV CODE- 250/637: Performed by: INTERNAL MEDICINE

## 2018-06-11 PROCEDURE — 80048 BASIC METABOLIC PNL TOTAL CA: CPT | Performed by: INTERNAL MEDICINE

## 2018-06-11 PROCEDURE — 77010033678 HC OXYGEN DAILY

## 2018-06-11 PROCEDURE — 74011250637 HC RX REV CODE- 250/637: Performed by: PHYSICIAN ASSISTANT

## 2018-06-11 PROCEDURE — 85025 COMPLETE CBC W/AUTO DIFF WBC: CPT | Performed by: INTERNAL MEDICINE

## 2018-06-11 PROCEDURE — 82962 GLUCOSE BLOOD TEST: CPT

## 2018-06-11 PROCEDURE — 36415 COLL VENOUS BLD VENIPUNCTURE: CPT | Performed by: INTERNAL MEDICINE

## 2018-06-11 RX ORDER — FUROSEMIDE 40 MG/1
TABLET ORAL
Qty: 30 TAB | Refills: 0 | Status: SHIPPED | OUTPATIENT
Start: 2018-06-11 | End: 2018-06-21 | Stop reason: SDUPTHER

## 2018-06-11 RX ORDER — RANITIDINE 150 MG/1
150 TABLET, FILM COATED ORAL 2 TIMES DAILY
Qty: 60 TAB | Refills: 0 | Status: SHIPPED | OUTPATIENT
Start: 2018-06-11 | End: 2018-07-12 | Stop reason: SDUPTHER

## 2018-06-11 RX ADMIN — ISOSORBIDE MONONITRATE 30 MG: 30 TABLET, EXTENDED RELEASE ORAL at 09:51

## 2018-06-11 RX ADMIN — OXYCODONE AND ACETAMINOPHEN 1 TABLET: 5; 325 TABLET ORAL at 12:21

## 2018-06-11 RX ADMIN — ASPIRIN 81 MG CHEWABLE TABLET 81 MG: 81 TABLET CHEWABLE at 09:52

## 2018-06-11 RX ADMIN — CYCLOBENZAPRINE HYDROCHLORIDE 5 MG: 5 TABLET, FILM COATED ORAL at 10:00

## 2018-06-11 RX ADMIN — PANTOPRAZOLE SODIUM 40 MG: 40 TABLET, DELAYED RELEASE ORAL at 09:51

## 2018-06-11 RX ADMIN — FUROSEMIDE 40 MG: 40 TABLET ORAL at 09:51

## 2018-06-11 RX ADMIN — CARVEDILOL 6.25 MG: 6.25 TABLET, FILM COATED ORAL at 09:51

## 2018-06-11 RX ADMIN — CYCLOBENZAPRINE HYDROCHLORIDE 5 MG: 5 TABLET, FILM COATED ORAL at 16:56

## 2018-06-11 RX ADMIN — TICAGRELOR 90 MG: 90 TABLET ORAL at 09:51

## 2018-06-11 NOTE — DISCHARGE INSTRUCTIONS
DISCHARGE SUMMARY from Nurse    PATIENT INSTRUCTIONS:    What to do at Home:  Recommended activity: Activity as tolerated    *  Please give a list of your current medications to your Primary Care Provider. *  Please update this list whenever your medications are discontinued, doses are      changed, or new medications (including over-the-counter products) are added. *  Please carry medication information at all times in case of emergency situations. These are general instructions for a healthy lifestyle:    No smoking/ No tobacco products/ Avoid exposure to second hand smoke  Surgeon General's Warning:  Quitting smoking now greatly reduces serious risk to your health. Obesity, smoking, and sedentary lifestyle greatly increases your risk for illness    A healthy diet, regular physical exercise & weight monitoring are important for maintaining a healthy lifestyle    You may be retaining fluid if you have a history of heart failure or if you experience any of the following symptoms:  Weight gain of 3 pounds or more overnight or 5 pounds in a week, increased swelling in our hands or feet or shortness of breath while lying flat in bed. Please call your doctor as soon as you notice any of these symptoms; do not wait until your next office visit. Recognize signs and symptoms of STROKE:    F-face looks uneven    A-arms unable to move or move unevenly    S-speech slurred or non-existent    T-time-call 911 as soon as signs and symptoms begin-DO NOT go       Back to bed or wait to see if you get better-TIME IS BRAIN. Warning Signs of HEART ATTACK     Call 911 if you have these symptoms:   Chest discomfort. Most heart attacks involve discomfort in the center of the chest that lasts more than a few minutes, or that goes away and comes back. It can feel like uncomfortable pressure, squeezing, fullness, or pain.  Discomfort in other areas of the upper body.  Symptoms can include pain or discomfort in one or both arms, the back, neck, jaw, or stomach.  Shortness of breath with or without chest discomfort.  Other signs may include breaking out in a cold sweat, nausea, or lightheadedness. Don't wait more than five minutes to call 911 - MINUTES MATTER! Fast action can save your life. Calling 911 is almost always the fastest way to get lifesaving treatment. Emergency Medical Services staff can begin treatment when they arrive -- up to an hour sooner than if someone gets to the hospital by car. The discharge information has been reviewed with the patient and spouse. The patient and spouse verbalized understanding. Discharge medications reviewed with the patient and spouse and appropriate educational materials and side effects teaching were provided. ___________________________________________________________________________________________________________________________________     Angina: Care Instructions  Your Care Instructions    You have a problem called angina. Angina happens when there is not enough blood flow to your heart muscle. Angina is a sign of coronary artery disease (CAD). CAD occurs when blood vessels that supply the heart become narrowed. Having CAD increases your risk of a heart attack. Chest pain or pressure is the most common symptom of angina. But some people have other symptoms, like:  · Pain, pressure, or a strange feeling in the back, neck, jaw, or upper belly, or in one or both shoulders or arms. · Shortness of breath. · Nausea or vomiting. · Lightheadedness or sudden weakness. · Fast or irregular heartbeat. Women are somewhat more likely than men to have angina symptoms like shortness of breath, nausea, and back or jaw pain. Angina can be dangerous. That's why it is important to pay attention to your symptoms. Know what is typical for you, learn how to control your symptoms, and understand when you need to get treatment.   A change in your usual pattern of symptoms is an emergency. It may mean that you are having a heart attack. The doctor has checked you carefully, but problems can develop later. If you notice any problems or new symptoms, get medical treatment right away. Follow-up care is a key part of your treatment and safety. Be sure to make and go to all appointments, and call your doctor if you are having problems. It's also a good idea to know your test results and keep a list of the medicines you take. How can you care for yourself at home? Medicines  ? · If your doctor has given you nitroglycerin for angina symptoms, keep it with you at all times. If you have symptoms, sit down and rest, and take the first dose of nitroglycerin as directed. If your symptoms get worse or are not getting better within 5 minutes, call 911 right away. Stay on the phone. The emergency  will give you further instructions. ? · If your doctor advises it, take 1 low-dose aspirin a day to prevent heart attack. ? · Be safe with medicines. Take your medicines exactly as prescribed. Call your doctor if you think you are having a problem with your medicine. You will get more details on the specific medicines your doctor prescribes. ? Lifestyle changes  ? · Do not smoke. If you need help quitting, talk to your doctor about stop-smoking programs and medicines. These can increase your chances of quitting for good. ? · Eat a heart-healthy diet that is low in saturated fat and salt, and is high in fiber. Talk to your doctor or a dietitian about healthy eating. ? · Stay at a healthy weight. Or lose weight if you need to. Activity  ? · Talk to your doctor about a level of activity that is safe for you. ? · If an activity causes angina symptoms, stop and rest.   When should you call for help? Call 911 anytime you think you may need emergency care. For example, call if:  ? · You passed out (lost consciousness). ? · You have symptoms of a heart attack.  These may include:  ¨ Chest pain or pressure, or a strange feeling in the chest.  ¨ Sweating. ¨ Shortness of breath. ¨ Nausea or vomiting. ¨ Pain, pressure, or a strange feeling in the back, neck, jaw, or upper belly or in one or both shoulders or arms. ¨ Lightheadedness or sudden weakness. ¨ A fast or irregular heartbeat. After you call 911, the  may tell you to chew 1 adult-strength or 2 to 4 low-dose aspirin. Wait for an ambulance. Do not try to drive yourself. ? · You have angina symptoms that do not go away with rest or are not getting better within 5 minutes after you take a dose of nitroglycerin. ?Call your doctor now or seek immediate medical care if:  ? · You are having angina symptoms more often than usual, or they are different or worse than usual.   ? · You feel dizzy or lightheaded, or you feel like you may faint. ? Watch closely for changes in your health, and be sure to contact your doctor if you have any problems. Where can you learn more? Go to http://jason-charity.info/. Enter H129 in the search box to learn more about \"Angina: Care Instructions. \"  Current as of: September 21, 2016  Content Version: 11.4  © 7097-8796 [employer]. Care instructions adapted under license by [employer] (which disclaims liability or warranty for this information). If you have questions about a medical condition or this instruction, always ask your healthcare professional. [place] [address] any warranty or liability for your use of this information. Chronic Obstructive Pulmonary Disease (COPD): Care Instructions  Your Care Instructions    Chronic obstructive pulmonary disease (COPD) is a general term for a group of lung diseases, including emphysema and chronic bronchitis. People with COPD have decreased airflow in and out of the lungs, which makes it hard to breathe. The airways also can get clogged with thick mucus.  Cigarette smoking is a major cause of COPD.  Although there is no cure for COPD, you can slow its progress. Following your treatment plan and taking care of yourself can help you feel better and live longer. Follow-up care is a key part of your treatment and safety. Be sure to make and go to all appointments, and call your doctor if you are having problems. It's also a good idea to know your test results and keep a list of the medicines you take. How can you care for yourself at home? ?Staying healthy  ? · Do not smoke. This is the most important step you can take to prevent more damage to your lungs. If you need help quitting, talk to your doctor about stop-smoking programs and medicines. These can increase your chances of quitting for good. ? · Avoid colds and flu. Get a pneumococcal vaccine shot. If you have had one before, ask your doctor whether you need a second dose. Get the flu vaccine every fall. If you must be around people with colds or the flu, wash your hands often. ? · Avoid secondhand smoke, air pollution, and high altitudes. Also avoid cold, dry air and hot, humid air. Stay at home with your windows closed when air pollution is bad. ?Medicines and oxygen therapy  ? · Take your medicines exactly as prescribed. Call your doctor if you think you are having a problem with your medicine. ? · You may be taking medicines such as:  ¨ Bronchodilators. These help open your airways and make breathing easier. Bronchodilators are either short-acting (work for 6 to 9 hours) or long-acting (work for 24 hours). You inhale most bronchodilators, so they start to act quickly. Always carry your quick-relief inhaler with you in case you need it while you are away from home. ¨ Corticosteroids (prednisone, budesonide). These reduce airway inflammation. They come in pill or inhaled form. You must take these medicines every day for them to work well. ? · A spacer may help you get more inhaled medicine to your lungs.  Ask your doctor or pharmacist if a spacer is right for you. If it is, ask how to use it properly. ? · Do not take any vitamins, over-the-counter medicine, or herbal products without talking to your doctor first.   ? · If your doctor prescribed antibiotics, take them as directed. Do not stop taking them just because you feel better. You need to take the full course of antibiotics. ? · Oxygen therapy boosts the amount of oxygen in your blood and helps you breathe easier. Use the flow rate your doctor has recommended, and do not change it without talking to your doctor first.   Activity  ? · Get regular exercise. Walking is an easy way to get exercise. Start out slowly, and walk a little more each day. ? · Pay attention to your breathing. You are exercising too hard if you cannot talk while you are exercising. ? · Take short rest breaks when doing household chores and other activities. ? · Learn breathing methods-such as breathing through pursed lips-to help you become less short of breath. ? · If your doctor has not set you up with a pulmonary rehabilitation program, talk to him or her about whether rehab is right for you. Rehab includes exercise programs, education about your disease and how to manage it, help with diet and other changes, and emotional support. Diet  ? · Eat regular, healthy meals. Use bronchodilators about 1 hour before you eat to make it easier to eat. Eat several small meals instead of three large ones. Drink beverages at the end of the meal. Avoid foods that are hard to chew. ? · Eat foods that contain protein so that you do not lose muscle mass. ? · Talk with your doctor if you gain too much weight or if you lose weight without trying. ?Mental health  ? · Talk to your family, friends, or a therapist about your feelings. It is normal to feel frightened, angry, hopeless, helpless, and even guilty. Talking openly about bad feelings can help you cope. If these feelings last, talk to your doctor.    When should you call for help? Call 911 anytime you think you may need emergency care. For example, call if:  ? · You have severe trouble breathing. ?Call your doctor now or seek immediate medical care if:  ? · You have new or worse trouble breathing. ? · You cough up blood. ? · You have a fever. ? Watch closely for changes in your health, and be sure to contact your doctor if:  ? · You cough more deeply or more often, especially if you notice more mucus or a change in the color of your mucus. ? · You have new or worse swelling in your legs or belly. ? · You are not getting better as expected. Where can you learn more? Go to http://jasonMimix Broadband.info/. Mychal Marinelli in the search box to learn more about \"Chronic Obstructive Pulmonary Disease (COPD): Care Instructions. \"  Current as of: May 12, 2017  Content Version: 11.4  © 7024-1869 [employer]. Care instructions adapted under license by [employer] (which disclaims liability or warranty for this information). If you have questions about a medical condition or this instruction, always ask your healthcare professional. [place] [address] any warranty or liability for your use of this information. Learning About Diabetes and Coronary Artery Disease  How are diabetes and heart disease connected? Many people think diabetes and heart disease go hand in hand. But having diabetes doesn't have to mean that you are going to have a heart attack someday. Healthy living can help prevent many of the problems that come with both diabetes and heart disease. For some people, diabetes can cause problems in your body that may lead to heart disease. Diabetes can make the problems of heart disease worse. Experts do not fully understand how diabetes affects the heart. Many things can lead to heart disease, including high blood sugar, insulin resistance, high cholesterol, and high blood pressure.  But lifestyle and genetics may also affect a person's risk. But here's the good news: The good things you're doing to stay healthy with diabetes-eating healthy foods, quitting smoking, getting exercise and more-are also helping your heart. What increases your risk for heart disease? When you have diabetes, your risk for heart disease is even higher if you have:  · High blood pressure, which pushes blood through the arteries with too much force. Over time, this damages the walls of the arteries. · High cholesterol, which causes the buildup of a kind of fat inside the blood vessel walls. This buildup can lower blood flow to the heart muscle and raise your risk for having a heart attack. · Kidney damage, which shares many of the risk factors for heart disease (such as high blood sugar, high blood pressure, and high cholesterol). How can you keep your heart healthy when you have diabetes? Managing your diabetes and keeping your heart healthy are two sides of the same coin. Here are some things you can do. · Test your blood sugar levels and get your diabetes tests on schedule. Try to keep your numbers within your target range. · Keep track of your blood pressure. The target for most people with diabetes is below 140/90. Your doctor will give you a goal that's right for you. If your blood pressure is high, your treatment may also include medicine. Changes in your lifestyle, such as staying at a healthy weight, may also help you lower your blood pressure. · Eat heart-healthy foods. These include fruits, vegetables, whole grains, fish, and low-fat or nonfat dairy foods. Limit sodium, alcohol, and sweets. · If your doctor recommends it, get more exercise. Walking is a good choice. Bit by bit, increase the amount you walk every day. Try for at least 30 minutes on most days of the week. · Do not smoke. Smoking can make diabetes and heart disease worse.  If you need help quitting, talk to your doctor about stop-smoking programs and medicines. These can increase your chances of quitting for good. · Your doctor may talk with you about taking medicines for your heart. For example, your doctor may suggest taking a statin or daily aspirin. Where can you learn more? Go to http://jason-charity.info/. Enter P781 in the search box to learn more about \"Learning About Diabetes and Coronary Artery Disease. \"  Current as of: March 13, 2017  Content Version: 11.4  © 8658-5587 [employer]. Care instructions adapted under license by [employer] (which disclaims liability or warranty for this information). If you have questions about a medical condition or this instruction, always ask your healthcare professional. [place] [address] any warranty or liability for your use of this information. Pulmonary Edema: Care Instructions  Your Care Instructions    Pulmonary edema is the buildup of fluid in the lungs. It usually occurs when the heart does not pump blood through the body properly. Pulmonary edema can also be caused by another disease, such as liver or kidney failure. It can also happen at high altitudes, from a poisoning, or as a result of a near-drowning. If you have fluid in your lungs, you may have trouble breathing, be restless, have a fast heart rate, or cough up foamy pink fluid. Breathing problems may be worse when you lie down. Follow-up care is a key part of your treatment and safety. Be sure to make and go to all appointments, and call your doctor if you are having problems. It's also a good idea to know your test results and keep a list of the medicines you take. How can you care for yourself at home? Medicines  ? · Take your medicines exactly as prescribed. Call your doctor if you think you are having a problem with your medicine. ? · Review all of your regular medicines with your doctor.  Do not take any vitamins, over-the-counter medicines, or herbal products without talking to your doctor first.   Diet  ? · Eat a balanced diet. Make an appointment with a dietitian if you have questions about what type of diet might be best for you. ? · Do not eat more than 2,000 milligrams (mg) of sodium each day. That is less than 1 teaspoon of salt a day, including all the salt you eat in prepared or packaged foods. ¨ Do not add salt while you are cooking or at the table. Flavor with garlic, lemon juice, onion, vinegar, herbs, and spices instead of salt. ¨ Eat fewer processed foods and foods from restaurants, including fast food. ¨ Use fresh or frozen foods instead of canned. ¨ Count and record how much sodium you eat each day. Check food labels for sodium. ¨ Ask your doctor before using salt substitutes that have potassium, such as Lite Salt. ? Lifestyle  ? · Stay out of air pollution; smog; cold, dry air; hot, humid air; and high altitudes. ? · Learn breathing methods that help the airflow in and out of your lungs. ? · Take rest breaks often. Schedule short rest breaks when doing housework and other activities. An occupational or physical therapist can help you find ways to do everyday activities with less effort. ? · Start light exercise if your doctor says it is okay. Try to stay as active as possible. If you have not exercised in the past, start out slowly. Walking is a good way to start. ? · Get enough rest at night. Sleeping with 1 or 2 pillows under your upper body and head may help you breathe easier at night. ? · Discuss rehabilitation with your doctor. Find out what programs are available in your area. ? · Do not smoke or use other tobacco products. Smoking can make your condition worse. If you need help quitting, talk to your doctor about stop-smoking programs and medicines. These can increase your chances of quitting for good. ? · Do not use alcohol or illegal drugs. When should you call for help? Call 911 anytime you think you may need emergency care. For example, call if:  ? · You have severe trouble breathing. ? · You passed out (lost consciousness). ? · You have symptoms of a heart attack. These may include:  ¨ Chest pain or pressure, or a strange feeling in the chest.  ¨ Sweating. ¨ Shortness of breath. ¨ Nausea or vomiting. ¨ Pain, pressure, or a strange feeling in the back, neck, jaw, or upper belly or in one or both shoulders or arms. ¨ Lightheadedness or sudden weakness. ¨ A fast or irregular heartbeat. Pain that spreads from the chest to the neck, jaw, or one or both shoulders or arms. ? After you call 911, the  may tell you to chew 1 adult-strength or 2 to 4 low-dose aspirin. Wait for an ambulance. Do not try to drive yourself. ?Call your doctor now or seek immediate medical care if:  ? · You have trouble breathing or have wheezing that is getting worse. ? · You are coughing more deeply or more often. ? · You cough up blood. ? · You get a fever. ? · You have more swelling in your legs or belly. ? · Your symptoms are getting worse. ? Watch closely for changes in your health, and be sure to contact your doctor if you have any problems. Where can you learn more? Go to http://jason-charity.info/. Enter W812 in the search box to learn more about \"Pulmonary Edema: Care Instructions. \"  Current as of: May 12, 2017  Content Version: 11.4  © 5100-6090 [employer]. Care instructions adapted under license by [employer] (which disclaims liability or warranty for this information). If you have questions about a medical condition or this instruction, always ask your healthcare professional. [place] [address] any warranty or liability for your use of this information.         DISCHARGE SUMMARY from Nurse    PATIENT INSTRUCTIONS:    What to do at Home:  Recommended activity: Activity as tolerated      *  Please give a list of your current medications to your Primary Care Provider. *  Please update this list whenever your medications are discontinued, doses are      changed, or new medications (including over-the-counter products) are added. *  Please carry medication information at all times in case of emergency situations. These are general instructions for a healthy lifestyle:    No smoking/ No tobacco products/ Avoid exposure to second hand smoke  Surgeon General's Warning:  Quitting smoking now greatly reduces serious risk to your health. Obesity, smoking, and sedentary lifestyle greatly increases your risk for illness    A healthy diet, regular physical exercise & weight monitoring are important for maintaining a healthy lifestyle    You may be retaining fluid if you have a history of heart failure or if you experience any of the following symptoms:  Weight gain of 3 pounds or more overnight or 5 pounds in a week, increased swelling in our hands or feet or shortness of breath while lying flat in bed. Please call your doctor as soon as you notice any of these symptoms; do not wait until your next office visit. Recognize signs and symptoms of STROKE:    F-face looks uneven  A-arms unable to move or move unevenly  S-speech slurred or non-existent  T-time-call 911 as soon as signs and symptoms begin-DO NOT go       Back to bed or wait to see if you get better-TIME IS BRAIN. Warning Signs of HEART ATTACK     Call 911 if you have these symptoms:   Chest discomfort. Most heart attacks involve discomfort in the center of the chest that lasts more than a few minutes, or that goes away and comes back. It can feel like uncomfortable pressure, squeezing, fullness, or pain.  Discomfort in other areas of the upper body. Symptoms can include pain or discomfort in one or both arms, the back, neck, jaw, or stomach.  Shortness of breath with or without chest discomfort.  Other signs may include breaking out in a cold sweat, nausea, or lightheadedness.   Don't wait more than five minutes to call 911 - MINUTES MATTER! Fast action can save your life. Calling 911 is almost always the fastest way to get lifesaving treatment. Emergency Medical Services staff can begin treatment when they arrive -- up to an hour sooner than if someone gets to the hospital by car. The discharge information has been reviewed with the patient. The patient verbalized understanding. Discharge medications reviewed with the patient and appropriate educational materials and side effects teaching were provided.   ___________________________________________________________________________________________________________________________________

## 2018-06-11 NOTE — DISCHARGE SUMMARY
Discharge Summary     Patient ID:  Blair Nelson  [id number]  29 y.o.  1950  Body mass index is 34.78 kg/(m^2). PCP on record: Ronald Santiago MD    Admit date: 6/8/2018  Discharge date and time: 6/11/2018    Discharge Diagnoses:                                           1. Acute on chronic systolic HF ( EF 40%) / Ischemic cardiomyopathy   2 CAD with recent intervention   3 morbid obesity - Body mass index is 34.78 kg/(m^2). 4 HLD   5 DM2      Consults: Cardiology          Hospital Course by problems:  HPI per admitting MD   Pili Sepulveda is a 74 y.o.  female who has PMH recent admission 2 ry to Code STEMI s/p LAD stenting followed by brief PEA and respiratory failure that required intubation and her EKG showed another ST elevation in infero-lateral leads . Pt had another cath an hour later where she  Was found with occluded stent and received 2 more stents. Pt self extubated the next day, felt better and was discharged on ASA , brilinta and BB   Pt returned to ER today with Lt side chest burning sensation and respiratory distress. Found to have pulmonary edema and her BP was elevated. Pt was also found to have mild elevation in troponin     Pt was started on lasix and gradually started to feel better. HR was elevated on ambulation up to 120 but goes down to 90's after  Pt received CT chest to r/o lung disease ew/out contrast for kidney failure but came back -ve  \"    Admitted with SOB from decompensated systolic heart failure . Improved with diuresis     With diuresis her renal function worsened - so Entresto or  ARB on hold . - THIS WILL BE RESTARTED ON OUT PATIENT FOLLOW UP WITH CARDIOLOGY PROVIDED RENAL FUNCTION HAS IMPROVED TO ACCEPTABLE LEVEL . Monitor renal functions as Out patient     On day of discharge patient reported some spotting on tissue - she was advised to observe , follow up with her PCP and from there to Gyn if needed .  She will return to ED if bleeding is significant . We will continue antiplatelets - recent h/o stenting         Patient seen and examined by me on discharge day. Pertinent Findings:  Patient is Alert Awake and oriented   HEENT - NAD    RS - Clear , no rales no rhonchi   CVS - regular rhythm and rate acceptable    abd - benign, BS present , no Distension   EXT - no edema , no calf tenderness   Neuro - alert and awake , grossly motor and sensory intact         Significant Diagnostic Studies:  CT chest without contrast.     Indications: Dyspnea, recent STEMI     Technique: Unenhanced contiguous 5 mm axial images were obtained from thoracic  inlet through the adrenal glands. Contrast used: None     CT scans at this facility are performed using dose optimization technique as  appropriate with performed exam, to include automated exposure control,  adjustment of mA and/or kV according to patient's size (including appropriate  matching for site-specific examinations), or use of iterative reconstruction  technique.     Comparison: Prior chest x-ray.     Findings:     Mediastinum: Normal heart size with trace pericardial fluid. LAD coronary stents  noted. Mild atherosclerosis of normal caliber aorta. No mediastinal or hilar  adenopathy. Dilated main pulmonary artery, 3.8 cm diameter. Esophagus  unremarkable.     Lungs: There is no focal pneumonia. No overt interstitial edema. Some  heterogeneity of lung parenchyma as from air trapping. The parenchyma looks  subtly \"busy\" as from micronodularity. No central endobronchial lesions.     Trace bilateral dependent pleural effusions.     Chest wall/ soft tissues: Unremarkable.     Upper abdomen: No acute findings.     Bones: No acute findings. Mild right convex midthoracic scoliosis. Multilevel  spondylosis.       IMPRESSION  IMPRESSION[de-identified]     1. There are potential findings of subtle micronodularity as from infection. 2. There is no coalescent pneumonia.   3. Trace bilateral pleural fluid which can be from infection.     Thank you for this referral.    Pertinent Lab Data:  Recent Labs      06/11/18   0515  06/10/18   0058  06/09/18   0828   WBC  9.5  10.2  10.3   HGB  11.5*  11.7*  12.4   HCT  35.5  35.1  37.7   PLT  349  370  409     Recent Labs      06/11/18   0515  06/10/18   0058  06/09/18   0828  06/08/18   1830   NA  136  137  139  140   K  4.4  3.9  4.1  4.4   CL  102  102  103  109*   CO2  24  25  23  23   GLU  133*  113*  127*  109*   BUN  58*  52*  35*  33*   CREA  2.37*  2.13*  1.64*  1.60*   CA  8.2*  8.3*  9.3  8.5   MG   --    --   1.8  2.0   PHOS   --    --   5.0*   --    ALB   --    --    --   3.2*   SGOT   --    --    --   12*   ALT   --    --    --   28       DISCHARGE MEDICATIONS:   @  Current Discharge Medication List      START taking these medications    Details   raNITIdine (ZANTAC) 150 mg tablet Take 1 Tab by mouth two (2) times a day. Qty: 60 Tab, Refills: 0      OTHER BMP - do the test on 6-  Qty: 1 Act, Refills: 0      furosemide (LASIX) 40 mg tablet 1 tab po daily  START DATE 6/14/2018  Qty: 30 Tab, Refills: 0         CONTINUE these medications which have NOT CHANGED    Details   carvedilol (COREG) 6.25 mg tablet Take 1 Tab by mouth two (2) times daily (with meals). Qty: 60 Tab, Refills: 0      atorvastatin (LIPITOR) 20 mg tablet Take 1 Tab by mouth nightly. Qty: 30 Tab, Refills: 0      aspirin 81 mg chewable tablet Take 1 Tab by mouth daily. Qty: 30 Tab, Refills: 0      ticagrelor (BRILINTA) 90 mg tablet Take 1 Tab by mouth two (2) times a day. Qty: 60 Tab, Refills: 0      oxyCODONE-acetaminophen (PERCOCET) 5-325 mg per tablet Take 1 Tab by mouth every six (6) hours as needed. Max Daily Amount: 4 Tabs.   Qty: 10 Tab, Refills: 0    Associated Diagnoses: ST elevation myocardial infarction involving left anterior descending (LAD) coronary artery (HCC)      insulin detemir (LEVEMIR) 100 unit/mL injection 15 units daily for diabetes  Qty: 2 Vial, Refills: 0    Associated Diagnoses: Diabetes mellitus type 2, insulin dependent (HCC)      albuterol (PROVENTIL HFA, VENTOLIN HFA, PROAIR HFA) 90 mcg/actuation inhaler Take 2 Puffs by inhalation every four (4) hours as needed for Wheezing. Qty: 1 Inhaler, Refills: 3    Associated Diagnoses: Chronic obstructive pulmonary disease, unspecified COPD type ([address] Former smoker      omega-3 acid ethyl esters (LOVAZA) 1 gram capsule Take 2 Caps by mouth daily (with breakfast). Qty: 60 Cap, Refills: 3    Associated Diagnoses: Hypertriglyceridemia      magnesium oxide (MAG-OX) 400 mg tablet Take 1 Tab by mouth daily. For low magnesium  Qty: 30 Tab, Refills: 11    Associated Diagnoses: Hypomagnesemia      desloratadine (CLARINEX) 5 mg tablet Take 1 Tab by mouth daily as needed for Allergies. Qty: 30 Tab, Refills: 0    Associated Diagnoses: Environmental allergies      mometasone (NASONEX) 50 mcg/actuation nasal spray 2 Sprays by Both Nostrils route daily as needed. For allergies  Qty: 3 Container, Refills: 3    Associated Diagnoses: Environmental allergies      umeclidinium-vilanterol (ANORO ELLIPTA) 62.5-25 mcg/actuation inhaler Take 1 Puff by inhalation daily. For COPD  Qty: 3 Inhaler, Refills: 3    Comments: 9/22/15 Dr Tanja Navarro prescribed. Will order via [facility] if available  Associated Diagnoses: Chronic obstructive pulmonary disease, unspecified COPD type ([facility]      esomeprazole (NEXIUM) 40 mg capsule Take 1 Cap by mouth daily as needed. For reflux  Qty: 90 Cap, Refills: 0    Associated Diagnoses: Gastroesophageal reflux disease with esophagitis      albuterol (PROVENTIL VENTOLIN) 2.5 mg /3 mL (0.083 %) nebulizer solution 3 mL by Nebulization route every four (4) hours as needed for Wheezing. Qty: 60 Each, Refills: 3    Associated Diagnoses: Chronic obstructive pulmonary disease, unspecified COPD type ([facility])      ferrous sulfate 325 mg (65 mg iron) tablet Take 1 Tab by mouth two (2) times a day.  Indications: IRON DEFICIENCY ANEMIA  Qty: 60 Tab, Refills: 11    Associated Diagnoses: Iron deficiency anemia               My Recommended Diet, Activity, Wound Care, and follow-up labs are listed in the patient's Discharge Insturctions which I have personally completed and reviewed. Disposition:     [] Home with family     [] [facility] PT/RN   [] SNF/NH   [] Inpatient Rehab/[facility]  Condition at Discharge:  Stable    Follow up with:   PCP : Joe Stone MD      Please follow-up tests/labs that are still pendin.  None  2.    >30 minutes spent coordinating this discharge (review instructions/follow-up, prescriptions, preparing report for sign off)    Signed:  Brock Morales MD  2018  4:40 PM

## 2018-06-11 NOTE — DIABETES MGMT
Glycemic Control Plan of Care    T2DM with current A1c of 7.0% (06/10/2018). See separate notes for assessment of home diabetes management and education, 06/11/2018. Home diabetes meds as reported by patient on 06/11/2018: Vial levemir insulin 15 units twice daily (every morning and daily at bedtime). POC BG range on 06/10/2018: 143-182 mg/dL. POC BG report on 06/11/2018 at time of review: 134 mg/dL. Results for Faith Robert (MRN [id number]) as of 6/11/2018 10:48   Ref. Range 6/10/2018 00:58 6/11/2018 05:15   Creatinine Latest Ref Range: 0.6 - 1.3 MG/DL 2.13 (H) 2.37 (H)     Results for Faith Robert (MRN [id number]) as of 6/11/2018 10:48   Ref. Range 6/10/2018 00:58 6/11/2018 05:15   GFR est non-AA Latest Ref Range: >60 ml/min/1.73m2 23 (L) 21 (L)   GFR est AA Latest Ref Range: >60 ml/min/1.73m2 28 (L) 25 (L)     Recommendation(s):  1.) Continue POC BG monitoring and correctional lispro insulin order. 2.) Add 15 units of lantus insulin daily at this time and continue to monitor BG. Adjust basal insulin dose as needed to keep BG in target range. Assessment:  Patient is 74year old with past medical history including type 2 diabetes mellitus, CAD, dilated cardiomyopathy,  Hypertension, COPD, former smoker, STEMI, and pulmonary edema - was admitted on 06/08/2018 with report of shortness of breath. Noted:  Acute on chronic systolic heart failure. T2DM with current A1c of 7.0% (06/10/2018). Most recent blood glucose values:    Results for Faith Robert (MRN [id number]) as of 6/11/2018 10:48   Ref. Range 6/10/2018 10:08 6/10/2018 16:34 6/10/2018 21:39   GLUCOSE,FAST - POC Latest Ref Range: 70 - 110 mg/dL 143 (H) 149 (H) 182 (H)     Results for Faith Robert (MRN [id number]) as of 6/11/2018 10:48   Ref.  Range 6/11/2018 07:39   GLUCOSE,FAST - POC Latest Ref Range: 70 - 110 mg/dL 134 (H)     Current A1C: 7.0% (06/10/2018) is equivalent to estimated average blood glucose of 154 mg/dL during the past 2-3 months. Current hospital diabetes medications:  Correctional lispro insulin ACHS. Normal sensitivity dose. Total daily dose insulin requirement previous day: 06/10/2018  Lispro: 2 units    Home diabetes medications: Patient reported on 06/11/2018:  Levemir insulin (vial) 15 units twice daily: morning and bedtime.     Diet: Cardiac regular; 2 Gm NA: FR 1500 ml; consistent carb 1800kcal    Goals:  Blood glucose will be within target range of  mg/dL by 06/14/2018    Education:  _X__  Refer to Diabetes Education Record: 06/11/2018             ___  Education not indicated at this time    Ron Fletcher RN [facility]  Pager: [phone]

## 2018-06-11 NOTE — PROGRESS NOTES
CMS went to speak with the pt in regards to the [employer] from Medicare About Your Rights. \"  Pt was able to review and sign the documents provided. No family were present at bedside. Signed documents can be found in the chart for review; additional copies were left with the pt for review.

## 2018-06-11 NOTE — DIABETES MGMT
Diabetes Patient/Family Education Record    Factors That  May Influence Patients Ability  to Learn or  Comply with Recommendations   []   Language barrier    []   Cultural needs[x]  Exercise: Patient stated that she's able to tolerate short/household distance walking exercise   []   Motivation    []   Cognitive limitation    []   Physical   [x]   Education    []   Physiological factors   []   Hearing/vision/speaking impairment   []   Oriental orthodox beliefs    []   Financial factors   []  Other:   []  No factors identified at this time. Person Instructed:   [x]   Patient   []   Family   []  Other     Preference for Learning:   [x]   Verbal   [x]   Written   []  Demonstration     Level of Comprehension & Competence:    [x]  Good                                      [] Fair                                     []  Poor                             []  Needs Reinforcement   [x]  Teachback completed    Education Component:   [x]  Medication management, including how to administer insulin (if appropriate) and potential medication interactions: Patient stated that she's on vial Levemir insulin 15 units twice daily at home: morning and bedtime. [x]  Nutritional management: Patient stated that she's able to maintain serving size/portion control of carbs (starches, fruits, dairy) and limiting intake of concentrated sweets to help control her blood sugar. [x]  Exercise: Patient stated that she's able to tolerate short/household distance walking exercise when not feeling sick. [x]  Signs, symptoms, and treatment of hyperglycemia and hypoglycemia   [x] Prevention, recognition and treatment of hyperglycemia and hypoglycemia   [x]  Importance of blood glucose monitoring and how to obtain a blood glucose meter: Patient stated that she has BG meter and testing supplies at home. She is checking her blood sugar at least 2x daily.    []  Instruction on use of the blood glucose meter   [x]  Discuss the importance of HbA1C monitoring: Encouraged patient to continue to follow her diabetes treatment plan to help keep her diabetes controlled.  Discussed with patient that her current A1c level is 7.0% (06/10/2018) which is equivalent to estimated average blood glucose of 154 mg/dL during the past 2-3 months.    []  Sick day guidelines   [x]  Proper use and disposal of lancets, needles, syringes or insulin pens (if appropriate)   []  Potential long-term complications (retinopathy, kidney disease, neuropathy, foot care)   [x] Information about whom to contact in case of emergency or for more information    [x]  Goal:  Patient/family will demonstrate understanding of Diabetes Self Management Skills by: 6/18/2018  Plan for post-discharge education or self-management support:    [x] Outpatient class schedule provided            [] Patient Declined    [] Scheduled for outpatient classes (date) _______     Kirstie Wright RN  pgr [phone]

## 2018-06-11 NOTE — PROGRESS NOTES
Problem: Falls - Risk of  Goal: *Absence of Falls  Document Luis Miguel Fall Risk and appropriate interventions in the flowsheet.    Outcome: Progressing Towards Goal  Fall Risk Interventions:            Medication Interventions: Evaluate medications/consider consulting pharmacy, Teach patient to arise slowly

## 2018-06-11 NOTE — PROGRESS NOTES
conducted an initial consultation and Spiritual Assessment for Kirill Sin, who is a 74 y.o.,female. Patients Primary Language is: [place]. According to the patients EMR Anabaptist Affiliation is: No Christian. The reason the Patient came to the hospital is:   Patient Active Problem List    Diagnosis Date Noted    Chest pain 06/09/2018    SOB (shortness of breath) 06/09/2018    Coronary artery disease involving native coronary artery with unstable angina pectoris ([address] 06/09/2018    Pulmonary edema 06/08/2018    Coronary artery disease involving native coronary artery of native heart without angina pectoris 05/31/2018    STEMI (ST elevation myocardial infarction) ([address] 05/20/2018    Acute pulmonary edema ([address] 05/20/2018    Caregiver stress 04/26/2017    Bilateral shoulder pain 09/27/2016    Elevated alkaline phosphatase level 09/27/2016    Chronic obstructive pulmonary disease ([address] 05/17/2016    Compliance with medication regimen 05/17/2016    Chronic pain of both knees 04/13/2016    Chronic pain syndrome 04/13/2016    Primary osteoarthritis of both knees 04/13/2016    Iron deficiency anemia 10/22/2015    Decreased GFR 10/22/2015    Dyslipidemia, goal LDL below 70 10/22/2015    Former smoker 10/22/2015    Osteoarthritis of both knees     Essential hypertension 09/17/2015    COPD (chronic obstructive pulmonary disease) ([address] 09/01/2015    Diabetes mellitus type 2, insulin dependent ([address] 07/02/2015    Environmental allergies 04/01/2015    Cardiomyopathy, dilated ([facility] 12/31/2014        The  provided the following Interventions:  Initiated a relationship of care and support. Explored issues of calixto, spirituality and/or Restorationist needs while hospitalized. Listened empathically. Provided chaplaincy education. Provided information about Spiritual Care Services. Offered prayer and assurance of continued prayers on patient's behalf. Chart reviewed.     The following outcomes were achieved:  Patient shared some information about their medical narrative and spiritual journey/beliefs. Patient processed feeling about current hospitalization. Patient expressed gratitude for the 's visit. Assessment:  Patient did not indicate any spiritual or Christian issues which require Spiritual Care Services interventions at this time. Patient does not have any Christian/cultural needs that will affect patients preferences in health care. Plan:  Chaplains will continue to follow and will provide pastoral care on an as needed or requested basis.  recommends bedside caregivers page  on duty if patient shows signs of acute spiritual or emotional distress.   Darlyn Rhodes, [address] [facility]  Spiritual Care  [phone]

## 2018-06-11 NOTE — PROGRESS NOTES
[facility]  -  Progress Note      Patient: Tita Gerber MRN: [id number]  SSN: [id number]    YOB: 1950  Age: 74 y.o. Sex: female      Admit Date: 6/8/2018    Hospital Problem List:     Hospital Problems  Date Reviewed: 5/31/2018          Codes Class Noted POA    Chest pain ICD-10-CM: R07.9  ICD-9-CM: 786.50  6/9/2018 Unknown        SOB (shortness of breath) ICD-10-CM: R06.02  ICD-9-CM: 786.05  6/9/2018 Unknown        * (Principal)Coronary artery disease involving native coronary artery with unstable angina pectoris ([facility] ICD-10-CM: I25.110  ICD-9-CM: 414.01, 411.1  6/9/2018 Unknown        Pulmonary edema ICD-10-CM: J81.1  ICD-9-CM: 537  6/8/2018 Unknown        COPD (chronic obstructive pulmonary disease) ([facility] ICD-10-CM: J44.9  ICD-9-CM: 496  9/1/2015 Yes    Overview Signed 9/11/2015  5:45 PM by Chad Kim NP     mild-mod dz; Dr Nela Schumacher                 -HFrEF exacerbation- she is near or at baseline.  The 4 pound weight gain may have volume overloaded her along with elevated pressures. Her BP remains stable. -CAD s/p YADIRA to proximal LAD with RESOLUTE stent 5/20/2018.  Acute stent thrombosis shortly after initial PCI on 5/20/2018 requiring emergency intubation for pulmonary edema and repeat cardiac cath and additional PCI/YADIRA to prox LAD. On DAPT with Brilinta. -Ischemic cardiomyopathy.  EF 35% on echo 5/21 with elevated pulmonary pressures at 44mm Hg  -DM Type II  -Dyslipidemia- LDL> 200, on Lipitor 80mg.  -HTN. On ACEI and CCB as outpatient. -COPD  -Tobacco abuse.  1 ppd X 50 years prior to admission    Assessment & Plan:     -BUN/Cr worse today. Appears her baseline Cr is around 1.8. PO lasix was already given this morning. Agree with hospitalist team to hold diuretics for 2-3 days and allow renal function to improve. Remind patient to watch fluid intake closely. If patient is to go home today, have BMP checked in 2-3 days and resume lasix if ok.  Can address initiation of ARB or Entresto as an outpatient as well. -Appointment made for 6/21 at 3pm at our [place] office. Pt should have BMP check in 4-5 days.  -Continue rest of cardiac regimen. Subjective:     Patient reports small amount of spotting/blood after urinating. This is new as of this morning. Pt is post-menopausal. Denies pain or dysuria.      Objective:      Patient Vitals for the past 8 hrs:   Temp Pulse Resp BP SpO2   06/11/18 1131 97.8 °F (36.6 °C) (!) 103 16 126/80 100 %   06/11/18 0741 97.7 °F (36.5 °C) 84 18 113/78 96 %   06/11/18 0412 98.3 °F (36.8 °C) 86 20 112/76 98 %         Patient Vitals for the past 96 hrs:   Weight   06/10/18 0650 91.9 kg (202 lb 9.6 oz)   06/09/18 0920 92.6 kg (204 lb 3.2 oz)   06/08/18 1801 97.1 kg (214 lb)         Intake/Output Summary (Last 24 hours) at 06/11/18 1158  Last data filed at 06/11/18 0015   Gross per 24 hour   Intake              600 ml   Output              950 ml   Net             -350 ml       Physical Exam:  General: awake, alert, oriented x 3, cooperative and in no distress  Neck:  supple  Lungs:  Clear to auscultation bilat on NC O2  Heart:  Reg rate and rhythm  Abdomen:  Soft, non-tender  Extremities:  Trace ankle edema, atraumatic    Data Review:     Labs: Results:       Chemistry Recent Labs      06/11/18   0515  06/10/18   0058  06/09/18   0828  06/08/18   1830   GLU  133*  113*  127*  109*   NA  136  137  139  140   K  4.4  3.9  4.1  4.4   CL  102  102  103  109*   CO2  24  25  23  23   BUN  58*  52*  35*  33*   CREA  2.37*  2.13*  1.64*  1.60*   CA  8.2*  8.3*  9.3  8.5   MG   --    --   1.8  2.0   PHOS   --    --   5.0*   --    AGAP  10  10  13  8   BUCR  24*  24*  21*  21*   AP   --    --    --   110   TP   --    --    --   6.7   ALB   --    --    --   3.2*   GLOB   --    --    --   3.5   AGRAT   --    --    --   0.9      CBC w/Diff Recent Labs      06/11/18   0515  06/10/18   0058  06/09/18   0828   WBC  9.5  10.2  10.3   RBC  4.14*  4.19* 4.43   HGB  11.5*  11.7*  12.4   HCT  35.5  35.1  37.7   PLT  349  370  409   GRANS  61  59  53   LYMPH  29  33  38   EOS  2  2  1      Cardiac Enzymes No results found for: CPK, CK, CKMMB, CKMB, RCK3, CKMBT, CKNDX, CKND1, GODFREY, TROPT, TROIQ, SANG, TROPT, TNIPOC, BNP, BNPP   Coagulation No results for input(s): PTP, INR, APTT in the last 72 hours.     No lab exists for component: INREXT    Lipid Panel Lab Results   Component Value Date/Time    Cholesterol, total 291 (H) 05/21/2018 06:23 AM    HDL Cholesterol 52 05/21/2018 06:23 AM    LDL, calculated 212.6 (H) 05/21/2018 06:23 AM    VLDL, calculated 26.4 05/21/2018 06:23 AM    Triglyceride 132 05/21/2018 06:23 AM    CHOL/HDL Ratio 5.6 (H) 05/21/2018 06:23 AM      BNP No results found for: BNP, BNPP, XBNPT   Liver Enzymes Recent Labs      06/08/18   1830   TP  6.7   ALB  3.2*   AP  110   SGOT  12*      Digoxin    Thyroid Studies Lab Results   Component Value Date/Time    TSH 1.01 05/21/2018 06:23 AM            Signed By: LORE Jones     June 11, 2018

## 2018-06-11 NOTE — ROUTINE PROCESS
I have reviewed discharge instructions with the patient and spouse. The patient and spouse verbalized understanding. IVs removed, patient spouse gathered belongings and patient escorted to personal vehicle via wheelchair.

## 2018-06-11 NOTE — ROUTINE PROCESS
Bedside and Verbal shift change report given to [address] [place] [address] (oncoming nurse) by Angelina Rice RN (offgoing nurse). Report included the following information SBAR, Intake/Output and Med Rec Status.

## 2018-06-11 NOTE — DISCHARGE SUMMARY
Discharge Summary    Patient: Gema Escamilla MRN: [id number]  CSN: [id number]    YOB: 1950  Age: 74 y.o. Sex: female    DOA: 5/20/2018 LOS:  LOS: 3 days   Discharge Date: 5/23/2018     Admission Diagnoses: STEMI (ST elevation myocardial infarction) [facility])    Discharge Diagnoses:    Anterolateral STEMI s/p YADIRA placement to proximal LAD with subsequent repeat PCI with stenting of ostial and mid LAD  Acute hypoxic respiratory failure, resolved  CARA on CKD 3  Acute systolic CHR secondary to cardiogenic shock in setting of acute MI  Ischemic cardiomyopathy, EF 35% post MI  DM 2  HTN  Dyslipidemia  COPD with mild, acute exacerbation  Tobacco abuse      Discharge Condition: Stable    PHYSICAL EXAM  Visit Vitals    /69    Pulse 98    Temp 98.1 °F (36.7 °C)    Resp 24    Ht 5' 4\" (1.626 m)    Wt 96.6 kg (213 lb)    SpO2 98%    BMI 36.56 kg/m2       General: In NAD. HEENT: NCAT. Sclerae anicteric, EOMI. Lungs:  Clear, no wheezes. Effort nonlabored. Heart:  RRR. Abdomen: Soft, NTTP. Extremities: Warm, no ischemia or edema. Psych:   Mood normal.  Neurologic:  Awake and alert, motor grossly nonfocal.      Hospital Course:   See admission H&P for full details of HPI. Patient was admitted to CICU following emergent cardiac catheterization. Following initial stent placement to proximal LAD she was noted to become bradycardic and unresponsive while in ICU. Patient required intubation and ultimately had repeat STEMI alert called - she underwent second cath with stent placement to ostial and mid LAD. Patient did well following second catheterization and actually self-extubated. She continued to improve and remained medically stable and is currently ready for discharge home with outpatient follow up as advised.       Consults:   Cardiology   PCCM    Significant Diagnostic Studies:   2D echo:  IMPRESSIONS:  Findings suggest single vessel coronary artery disease in the territory of   the left anterior descending coronary  artery. The findings are consistent with significant valve disease. Features   were consistent with pulmonary  hypertension. SUMMARY:  Left ventricle: Systolic function was moderately to markedly reduced by   visual assessment. Ejection fraction was  estimated to be 35 %. There was severe hypokinesis of the entire anterior,   basal-mid anteroseptal, apical septal,  apical lateral, and apical wall(s). Wall thickness was mildly increased. Right ventricle: Systolic pressure was mildly increased. Estimated peak   pressure was 44 mmHg. Mitral valve: There was mild to moderate regurgitation. COMPARISONS:  Comparison was made with the previous study of 23-Aug-2015. LV overall   function has decreased. Pulmonary artery  pressure has increased. Otherwise no significant change. CXR:  IMPRESSION:     Persistent but improved bilateral interstitial and airspace opacities suggesting  improving edema. Discharge Medications:     Discharge Medication List as of 5/23/2018  4:55 PM      START taking these medications    Details   carvedilol (COREG) 6.25 mg tablet Take 1 Tab by mouth two (2) times daily (with meals). , Print, Disp-60 Tab, R-0      atorvastatin (LIPITOR) 20 mg tablet Take 1 Tab by mouth nightly. , Print, Disp-30 Tab, R-0      aspirin 81 mg chewable tablet Take 1 Tab by mouth daily. , Print, Disp-30 Tab, R-0      ticagrelor (BRILINTA) 90 mg tablet Take 1 Tab by mouth two (2) times a day., Print, Disp-60 Tab, R-0      oxyCODONE-acetaminophen (PERCOCET) 5-325 mg per tablet Take 1 Tab by mouth every six (6) hours as needed.  Max Daily Amount: 4 Tabs., Print, Disp-10 Tab, R-0         CONTINUE these medications which have NOT CHANGED    Details   insulin detemir (LEVEMIR) 100 unit/mL injection 15 units daily for diabetes, Normal, Disp-2 Vial, R-0      albuterol (PROVENTIL HFA, VENTOLIN HFA, PROAIR HFA) 90 mcg/actuation inhaler Take 2 Puffs by inhalation every four (4) hours as needed for Wheezing., Normal, Disp-1 Inhaler, R-3      omega-3 acid ethyl esters (LOVAZA) 1 gram capsule Take 2 Caps by mouth daily (with breakfast). , Normal, Disp-60 Cap, R-3      magnesium oxide (MAG-OX) 400 mg tablet Take 1 Tab by mouth daily. For low magnesium, Normal, Disp-30 Tab, R-11      desloratadine (CLARINEX) 5 mg tablet Take 1 Tab by mouth daily as needed for Allergies. , Program, Disp-30 Tab, R-0      mometasone (NASONEX) 50 mcg/actuation nasal spray 2 Sprays by Both Nostrils route daily as needed. For allergies, Program, Disp-3 Container, R-3      umeclidinium-vilanterol (ANORO ELLIPTA) 62.5-25 mcg/actuation inhaler Take 1 Puff by inhalation daily. For COPD, Program9/22/15 Dr Naveen Ncihols prescribed. Will order via [facility] if availableDisp-3 Inhaler, R-3      esomeprazole (NEXIUM) 40 mg capsule Take 1 Cap by mouth daily as needed. For reflux, Sample, Disp-90 Cap, R-0      albuterol (PROVENTIL VENTOLIN) 2.5 mg /3 mL (0.083 %) nebulizer solution 3 mL by Nebulization route every four (4) hours as needed for Wheezing., Normal, Disp-60 Each, R-3      ferrous sulfate 325 mg (65 mg iron) tablet Take 1 Tab by mouth two (2) times a day. Indications: IRON DEFICIENCY ANEMIA, No Print, Disp-60 Tab, R-11         STOP taking these medications       amLODIPine-atorvastatin (CADUET) 10-80 mg per tablet Comments:   Reason for Stopping:         lisinopril (PRINIVIL, ZESTRIL) 20 mg tablet Comments:   Reason for Stopping:         traMADol (ULTRAM) 50 mg tablet Comments:   Reason for Stopping:                   Activity: As tolerated    Diet: Cardiac Diet and Diabetic Diet    Follow-up: with PCP, Joi Rangel MD in 1 week and cardiology as directed. Bebeto Quach.  Gabby Sims MD  [facility]

## 2018-06-11 NOTE — PROGRESS NOTES
[facility] Hospitalist Group  Progress Note    Patient: Abdulaziz Garcia Age: 74 y.o. : 1950 MR#: [id number] SSN: [id number]  Date/Time: 2018     Subjective:     Review of systems    No CP   NO NVD  No SOB  NO Cough     Assessment/Plan:   1. Acute on chronic systolic HF ( EF 08%)   2 CAD with recent intervention   3 morbid obesity - Body mass index is 34.78 kg/(m^2). 4 HLD   5 DM2      PLAN   - Stable   - Improved respi status   - Worsening renal failure - Hold lasix for a day or two and monitor renal function , some compromise is expected . On admission she was in fluid over load status with Cr 1.6. ? What is her baseline     - Entresto on hold due to renal dysfunction - ? Start as out patient when renal function is acceptable - defer to Cardiology      - Spotting noted by patient on wiping /on tissue - no major bleed / will continue antiplatelets - out patient follow up with her PCP /gyn exam .      Case discussed with:  [x]Patient  [x]Family  [x]Nursing  []Case Management  DVT Prophylaxis:  []Lovenox  []Hep SQ  []SCDs  []Coumadin   []On Heparin gtt          Objective:   VS:   Visit Vitals    /80    Pulse (!) 103    Temp 97.8 °F (36.6 °C)    Resp 16    Ht 5' 4\" (1.626 m)    Wt 91.9 kg (202 lb 9.6 oz)    SpO2 100%    BMI 34.78 kg/m2      Tmax/24hrs: Temp (24hrs), Av.2 °F (36.8 °C), Min:97.7 °F (36.5 °C), Max:99.3 °F (37.4 °C)  IOBRIEF    Intake/Output Summary (Last 24 hours) at 18 1152  Last data filed at 18 0015   Gross per 24 hour   Intake              600 ml   Output              950 ml   Net             -350 ml       General:  Alert, cooperative, no acute distress   HEENT:  NC, Atraumatic. PERRLA, anicteric sclerae. Pulmonary:  Bilateral air entry present . No Wheezing/Rhonchi/Rales. Cardiovascular: Regular rate and Rhythm. GI:  Soft, Non distended, Non tender. + Bowel sounds. Extremities:  No edema, cyanosis, clubbing. No calf tenderness. Psych:  Not anxious or agitated. Neurologic: Grossly - Motor and Sensory functions are intact .       Medications:   Current Facility-Administered Medications   Medication Dose Route Frequency    insulin lispro (HUMALOG) injection   SubCUTAneous AC&HS    glucose chewable tablet 16 g  4 Tab Oral PRN    glucagon (GLUCAGEN) injection 1 mg  1 mg IntraMUSCular PRN    dextrose (D50W) injection syrg 12.5-25 g  25-50 mL IntraVENous PRN    cyclobenzaprine (FLEXERIL) tablet 5 mg  5 mg Oral TID PRN    isosorbide mononitrate ER (IMDUR) tablet 30 mg  30 mg Oral DAILY    pantoprazole (PROTONIX) tablet 40 mg  40 mg Oral ACB    carvedilol (COREG) tablet 6.25 mg  6.25 mg Oral Q12H    aspirin chewable tablet 81 mg  81 mg Oral DAILY    atorvastatin (LIPITOR) tablet 40 mg  40 mg Oral QHS    ticagrelor (BRILINTA) tablet 90 mg  90 mg Oral BID    acetaminophen (TYLENOL) tablet 650 mg  650 mg Oral Q6H PRN    oxyCODONE-acetaminophen (PERCOCET) 5-325 mg per tablet 1 Tab  1 Tab Oral Q6H PRN    naloxone (NARCAN) injection 0.4 mg  0.4 mg IntraVENous PRN    ondansetron (ZOFRAN) injection 4 mg  4 mg IntraVENous Q6H PRN    docusate sodium (COLACE) capsule 100 mg  100 mg Oral BID PRN    albuterol-ipratropium (DUO-NEB) 2.5 MG-0.5 MG/3 ML  3 mL Nebulization Q6H PRN       Labs:    Recent Labs      06/11/18   0515  06/10/18   0058  06/09/18   0828   WBC  9.5  10.2  10.3   HGB  11.5*  11.7*  12.4   HCT  35.5  35.1  37.7   PLT  349  370  409     Recent Labs      06/11/18   0515  06/10/18   0058  06/09/18   0828  06/08/18   1830   NA  136  137  139  140   K  4.4  3.9  4.1  4.4   CL  102  102  103  109*   CO2  24  25  23  23   GLU  133*  113*  127*  109*   BUN  58*  52*  35*  33*   CREA  2.37*  2.13*  1.64*  1.60*   CA  8.2*  8.3*  9.3  8.5   MG   --    --   1.8  2.0   PHOS   --    --   5.0*   --    ALB   --    --    --   3.2*   SGOT   --    --    --   12*   ALT   --    --    --   28         Signed By: Madison Floyd MD     June 11, 2018

## 2018-06-15 ENCOUNTER — HOSPITAL ENCOUNTER (OUTPATIENT)
Dept: LAB | Age: 68
Discharge: HOME | End: 2018-06-15
Payer: MEDICARE

## 2018-06-15 DIAGNOSIS — I25.10 CAD (CORONARY ARTERY DISEASE): ICD-10-CM

## 2018-06-15 LAB
ANION GAP SERPL CALC-SCNC: 7 MMOL/L (ref 3–18)
BUN SERPL-MCNC: 23 MG/DL (ref 7–18)
BUN/CREAT SERPL: 15 (ref 12–20)
CALCIUM SERPL-MCNC: 8.9 MG/DL (ref 8.5–10.1)
CHLORIDE SERPL-SCNC: 105 MMOL/L (ref 100–108)
CO2 SERPL-SCNC: 27 MMOL/L (ref 21–32)
CREAT SERPL-MCNC: 1.58 MG/DL (ref 0.6–1.3)
GLUCOSE SERPL-MCNC: 153 MG/DL (ref 74–99)
POTASSIUM SERPL-SCNC: 4.7 MMOL/L (ref 3.5–5.5)
SODIUM SERPL-SCNC: 139 MMOL/L (ref 136–145)

## 2018-06-15 PROCEDURE — 36415 COLL VENOUS BLD VENIPUNCTURE: CPT | Performed by: INTERNAL MEDICINE

## 2018-06-15 PROCEDURE — 80048 BASIC METABOLIC PNL TOTAL CA: CPT | Performed by: INTERNAL MEDICINE

## 2018-06-21 ENCOUNTER — OFFICE VISIT (OUTPATIENT)
Dept: CARDIOLOGY CLINIC | Age: 68
End: 2018-06-21

## 2018-06-21 VITALS
HEIGHT: 64 IN | WEIGHT: 204 LBS | DIASTOLIC BLOOD PRESSURE: 88 MMHG | OXYGEN SATURATION: 98 % | HEART RATE: 85 BPM | BODY MASS INDEX: 34.83 KG/M2 | SYSTOLIC BLOOD PRESSURE: 140 MMHG

## 2018-06-21 DIAGNOSIS — R06.02 SOB (SHORTNESS OF BREATH): ICD-10-CM

## 2018-06-21 DIAGNOSIS — I25.10 CORONARY ARTERY DISEASE INVOLVING NATIVE CORONARY ARTERY OF NATIVE HEART WITHOUT ANGINA PECTORIS: Primary | ICD-10-CM

## 2018-06-21 DIAGNOSIS — R07.9 CHEST PAIN, UNSPECIFIED TYPE: ICD-10-CM

## 2018-06-21 DIAGNOSIS — I50.22 CHRONIC SYSTOLIC CONGESTIVE HEART FAILURE (HCC): ICD-10-CM

## 2018-06-21 RX ORDER — LEFLUNOMIDE 20 MG/1
20 TABLET ORAL DAILY
COMMUNITY
End: 2019-07-19

## 2018-06-21 RX ORDER — GUAIFENESIN 100 MG/5ML
81 LIQUID (ML) ORAL DAILY
Qty: 30 TAB | Refills: 0 | Status: CANCELLED | OUTPATIENT
Start: 2018-06-21

## 2018-06-21 RX ORDER — ATORVASTATIN CALCIUM 20 MG/1
20 TABLET, FILM COATED ORAL
Qty: 30 TAB | Refills: 0 | Status: CANCELLED | OUTPATIENT
Start: 2018-06-21

## 2018-06-21 RX ORDER — FUROSEMIDE 40 MG/1
TABLET ORAL
Qty: 30 TAB | Refills: 6 | Status: SHIPPED | OUTPATIENT
Start: 2018-06-21 | End: 2018-08-02 | Stop reason: SDUPTHER

## 2018-06-21 RX ORDER — CARVEDILOL 6.25 MG/1
6.25 TABLET ORAL 2 TIMES DAILY WITH MEALS
Qty: 60 TAB | Refills: 6 | Status: SHIPPED | OUTPATIENT
Start: 2018-06-21 | End: 2019-09-09

## 2018-06-21 NOTE — PATIENT INSTRUCTIONS
Begin Entresto 24/26 twice a day  All other medications to remain the same  BMP to be done prior to returning for follow-up  Follow-up with Bhaskar Sagastume in 2 weeks  Weigh daily and record  Limit sodium intake to 2000mg per day  Limit fluid intake to no more than  6, eight ounce glasses of any type of fluids per day (total of 48 ounces per day)  Call if you notice sudden or progressive weight gain (3-5 pounds in 2-3 days), increasing shortness of breath, abdominal bloating, increasing lower extremity edema, inability to lie flat or on your normal number of pillows, having to sit up to catch your breath, fatigue, increased somnolence (sleeping more), poor appetite         Heart Failure: Care Instructions  Your Care Instructions    Heart failure occurs when your heart does not pump as much blood as the body needs. Failure does not mean that the heart has stopped pumping but rather that it is not pumping as well as it should. Over time, this causes fluid buildup in your lungs and other parts of your body. Fluid buildup can cause shortness of breath, fatigue, swollen ankles, and other problems. By taking medicines regularly, reducing sodium (salt) in your diet, checking your weight every day, and making lifestyle changes, you can feel better and live longer. Follow-up care is a key part of your treatment and safety. Be sure to make and go to all appointments, and call your doctor if you are having problems. It's also a good idea to know your test results and keep a list of the medicines you take. How can you care for yourself at home? Medicines  ? · Be safe with medicines. Take your medicines exactly as prescribed. Call your doctor if you think you are having a problem with your medicine.    ? · Do not take any vitamins, over-the-counter medicine, or herbal products without talking to your doctor first. Paulette Both not take ibuprofen (Advil or Motrin) and naproxen (Aleve) without talking to your doctor first. They could make your heart failure worse. ? · You may be taking some of the following medicine. ¨ Beta-blockers can slow heart rate, decrease blood pressure, and improve your condition. Taking a beta-blocker may lower your chance of needing to be hospitalized. ¨ Angiotensin-converting enzyme inhibitors (ACEIs) reduce the heart's workload, lower blood pressure, and reduce swelling. Taking an ACEI may lower your chance of needing to be hospitalized again. ¨ Angiotensin II receptor blockers (ARBs) work like ACEIs. Your doctor may prescribe them instead of ACEIs. ¨ Diuretics, also called water pills, reduce swelling. ¨ Potassium supplements replace this important mineral, which is sometimes lost with diuretics. ¨ Aspirin and other blood thinners prevent blood clots, which can cause a stroke or heart attack. ? You will get more details on the specific medicines your doctor prescribes. Diet  ? · Your doctor may suggest that you limit sodium to 2,000 milligrams (mg) a day or less. That is less than 1 teaspoon of salt a day, including all the salt you eat in cooking or in packaged foods. People get most of their sodium from processed foods. Fast food and restaurant meals also tend to be very high in sodium. ? · Ask your doctor how much liquid you can drink each day. You may have to limit liquids. ?Weight  ? · Weigh yourself without clothing at the same time each day. Record your weight. Call your doctor if you have a sudden weight gain, such as more than 2 to 3 pounds in a day or 5 pounds in a week. (Your doctor may suggest a different range of weight gain.) A sudden weight gain may mean that your heart failure is getting worse. ? Activity level  ? · Start light exercise (if your doctor says it is okay). Even if you can only do a small amount, exercise will help you get stronger, have more energy, and manage your weight and your stress. Walking is an easy way to get exercise.  Start out by walking a little more than you did before. Bit by bit, increase the amount you walk. ? · When you exercise, watch for signs that your heart is working too hard. You are pushing yourself too hard if you cannot talk while you are exercising. If you become short of breath or dizzy or have chest pain, stop, sit down, and rest.   ? · If you feel \"wiped out\" the day after you exercise, walk slower or for a shorter distance until you can work up to a better pace. ? · Get enough rest at night. Sleeping with 1 or 2 pillows under your upper body and head may help you breathe easier. ? Lifestyle changes  ? · Do not smoke. Smoking can make a heart condition worse. If you need help quitting, talk to your doctor about stop-smoking programs and medicines. These can increase your chances of quitting for good. Quitting smoking may be the most important step you can take to protect your heart. ? · Limit alcohol to 2 drinks a day for men and 1 drink a day for women. Too much alcohol can cause health problems. ? · Avoid getting sick from colds and the flu. Get a pneumococcal vaccine shot. If you have had one before, ask your doctor whether you need another dose. Get a flu shot each year. If you must be around people with colds or the flu, wash your hands often. When should you call for help? Call 911 if you have symptoms of sudden heart failure such as:  ? · You have severe trouble breathing. ? · You cough up pink, foamy mucus. ? · You have a new irregular or rapid heartbeat. ?Call your doctor now or seek immediate medical care if:  ? · You have new or increased shortness of breath. ? · You are dizzy or lightheaded, or you feel like you may faint. ? · You have sudden weight gain, such as more than 2 to 3 pounds in a day or 5 pounds in a week. (Your doctor may suggest a different range of weight gain.)   ? · You have increased swelling in your legs, ankles, or feet. ? · You are suddenly so tired or weak that you cannot do your usual activities. ?Watch closely for changes in your health, and be sure to contact your doctor if you develop new symptoms. Where can you learn more? Go to http://jason-charity.info/. Enter N699 in the search box to learn more about \"Heart Failure: Care Instructions. \"  Current as of: September 21, 2016  Content Version: 11.4  © 6578-5141 [employer]. Care instructions adapted under license by [employer] (which disclaims liability or warranty for this information). If you have questions about a medical condition or this instruction, always ask your healthcare professional. [address] any warranty or liability for your use of this information. Limiting Sodium and Fluids With Heart Failure: Care Instructions  Your Care Instructions    Sodium causes your body to hold on to extra water. This may cause your heart failure symptoms to get worse. Limiting sodium may help you feel better and lower your risk of having to go to the hospital.  People get most of their sodium from processed foods. Fast food and restaurant meals also tend to be very high in sodium. Your doctor may suggest that you limit sodium to 2,000 milligrams (mg) a day or less. That is less than 1 teaspoon of salt a day, including all the salt you eat in cooked or packaged foods. Usually, you have to limit the amount of liquids you drink only if your heart failure is severe. Limiting sodium alone often is enough to help your body get rid of extra fluids. However, your doctor may tell you to limit your fluid intake to a set amount each day. Follow-up care is a key part of your treatment and safety. Be sure to make and go to all appointments, and call your doctor if you are having problems. It's also a good idea to know your test results and keep a list of the medicines you take. How can you care for yourself at home? Read food labels  · Read food labels on cans and food packages.  The labels tell you how much sodium is in each serving. Make sure that you look at the serving size. If you eat more than the serving size, you have eaten more sodium than is listed for one serving. · Food labels also tell you the Percent Daily Value. If the Percent Daily Value says 50%, it means that you will get at least 50% of all the sodium you need for the entire day in one serving. Choose products with low Percent Daily Values for sodium. · Be aware that sodium can come in forms other than salt, including monosodium glutamate (MSG), sodium citrate, and sodium bicarbonate (baking soda). MSG is often added to Asian food. You can sometimes ask for food without MSG or salt. Buy low-sodium foods  · Buy foods that are labeled \"unsalted\" (no salt added), \"sodium-free\" (less than 5 mg of sodium per serving), or \"low-sodium\" (less than 140 mg of sodium per serving). A food labeled \"light sodium\" has less than half of the full-sodium version of that food. Foods labeled \"reduced-sodium\" may still have too much sodium. · Buy fresh vegetables or plain, frozen vegetables. Buy low-sodium versions of canned vegetables, soups, and other canned goods. Prepare low-sodium meals  · Use less salt each day when cooking. Reducing salt in this way will help you adjust to the taste. Do not add salt after cooking. Take the salt shaker off the table. · Flavor your food with garlic, lemon juice, onion, vinegar, herbs, and spices instead of salt. Do not use soy sauce, steak sauce, onion salt, garlic salt, mustard, or ketchup on your food. · Make your own salad dressings, sauces, and ketchup without adding salt. · Use less salt (or none) when recipes call for it. You can often use half the salt a recipe calls for without losing flavor. Other dishes like rice, pasta, and grains do not need added salt. · Rinse canned vegetables. This removes some-but not all-of the salt.   · Avoid water that has a naturally high sodium content or that has been treated with water softeners, which add sodium. Call your local water company to find out the sodium content of your water supply. If you buy bottled water, read the label and choose a sodium-free brand. Avoid high-sodium foods, such as:  · Smoked, cured, salted, and canned meat, fish, and poultry. · Ham, pennington, hot dogs, and luncheon meats. · Regular, hard, and processed cheese and regular peanut butter. · Crackers with salted tops. · Frozen prepared meals. · Canned and dried soups, broths, and bouillon, unless labeled sodium-free or low-sodium. · Canned vegetables, unless labeled sodium-free or low-sodium. · Salted snack foods such as chips and pretzels. · Western Kylie fries, pizza, tacos, and other fast foods. · Pickles, olives, ketchup, and other condiments, especially soy sauce, unless labeled sodium-free or low-sodium. If you cannot cook for yourself  · Have family members or friends help you, or have someone cook low-sodium meals. · Check with your local senior nutrition program to find out where meals are served and whether they offer a low-sodium option. You can often find these programs through your local health department or hospital.  · Have meals delivered to your home. Most [facility] have a Meals on [employer]. These programs provide one hot meal a day for older adults, delivered to their homes. Ask whether these meals are low-sodium. Let them know that you are on a low-sodium diet. Limiting fluid intake  · Find a method that works for you. You might simply write down how much you drink every time you do. Some people keep a container filled with the amount of fluid allowed for that day. If they drink from a source other than the container, then they pour out that amount. · Measure your regular drinking glasses to find out how much fluid each one holds. Once you know this, you will not have to measure every time. · Besides water, milk, juices, and other drinks, some foods have a lot of fluid. Count any foods that will melt (such as ice cream or gelatin dessert) or liquid foods (such as soup) as part of your fluid intake for the day. Where can you learn more? Go to http://jason-charity.info/. Enter A166 in the search box to learn more about \"Limiting Sodium and Fluids With Heart Failure: Care Instructions. \"  Current as of: September 21, 2016  Content Version: 11.4  © 7354-9275 [employer]. Care instructions adapted under license by [employer] (which disclaims liability or warranty for this information). If you have questions about a medical condition or this instruction, always ask your healthcare professional. [place] [address] any warranty or liability for your use of this information. Low Sodium Diet (2,000 Milligram): Care Instructions  Your Care Instructions    Too much sodium causes your body to hold on to extra water. This can raise your blood pressure and force your heart and kidneys to work harder. In very serious cases, this could cause you to be put in the hospital. It might even be life-threatening. By limiting sodium, you will feel better and lower your risk of serious problems. The most common source of sodium is salt. People get most of the salt in their diet from canned, prepared, and packaged foods. Fast food and restaurant meals also are very high in sodium. Your doctor will probably limit your sodium to less than 2,000 milligrams (mg) a day. This limit counts all the sodium in prepared and packaged foods and any salt you add to your food. Follow-up care is a key part of your treatment and safety. Be sure to make and go to all appointments, and call your doctor if you are having problems. It's also a good idea to know your test results and keep a list of the medicines you take. How can you care for yourself at home? Read food labels  · Read labels on cans and food packages.  The labels tell you how much sodium is in each serving. Make sure that you look at the serving size. If you eat more than the serving size, you have eaten more sodium. · Food labels also tell you the Percent Daily Value for sodium. Choose products with low Percent Daily Values for sodium. · Be aware that sodium can come in forms other than salt, including monosodium glutamate (MSG), sodium citrate, and sodium bicarbonate (baking soda). MSG is often added to Asian food. When you eat out, you can sometimes ask for food without MSG or added salt. Buy low-sodium foods  · Buy foods that are labeled \"unsalted\" (no salt added), \"sodium-free\" (less than 5 mg of sodium per serving), or \"low-sodium\" (less than 140 mg of sodium per serving). Foods labeled \"reduced-sodium\" and \"light sodium\" may still have too much sodium. Be sure to read the label to see how much sodium you are getting. · Buy fresh vegetables, or frozen vegetables without added sauces. Buy low-sodium versions of canned vegetables, soups, and other canned goods. Prepare low-sodium meals  · Cut back on the amount of salt you use in cooking. This will help you adjust to the taste. Do not add salt after cooking. One teaspoon of salt has about 2,300 mg of sodium. · Take the salt shaker off the table. · Flavor your food with garlic, lemon juice, onion, vinegar, herbs, and spices. Do not use soy sauce, lite soy sauce, steak sauce, onion salt, garlic salt, celery salt, mustard, or ketchup on your food. · Use low-sodium salad dressings, sauces, and ketchup. Or make your own salad dressings and sauces without adding salt. · Use less salt (or none) when recipes call for it. You can often use half the salt a recipe calls for without losing flavor. Other foods such as rice, pasta, and grains do not need added salt. · Rinse canned vegetables, and cook them in fresh water. This removes some-but not all-of the salt.   · Avoid water that is naturally high in sodium or that has been treated with water softeners, which add sodium. Call your local water company to find out the sodium content of your water supply. If you buy bottled water, read the label and choose a sodium-free brand. Avoid high-sodium foods  · Avoid eating:  ¨ Smoked, cured, salted, and canned meat, fish, and poultry. ¨ Ham, pennington, hot dogs, and luncheon meats. ¨ Regular, hard, and processed cheese and regular peanut butter. ¨ Crackers with salted tops, and other salted snack foods such as pretzels, chips, and salted popcorn. ¨ Frozen prepared meals, unless labeled low-sodium. ¨ Canned and dried soups, broths, and bouillon, unless labeled sodium-free or low-sodium. ¨ Canned vegetables, unless labeled sodium-free or low-sodium. ¨ Western Kylie fries, pizza, tacos, and other fast foods. ¨ Pickles, olives, ketchup, and other condiments, especially soy sauce, unless labeled sodium-free or low-sodium. Where can you learn more? Go to http://jason-charity.info/. Enter O765 in the search box to learn more about \"Low Sodium Diet (2,000 Milligram): Care Instructions. \"  Current as of: May 12, 2017  Content Version: 11.4  © 3244-0242 [employer]. Care instructions adapted under license by [employer] (which disclaims liability or warranty for this information). If you have questions about a medical condition or this instruction, always ask your healthcare professional. [place] [address] any warranty or liability for your use of this information.

## 2018-06-21 NOTE — PROGRESS NOTES
Ellen Tran presents today for a post-hospital follow-up. She was hospitalized from 6/8/18 thrugh 6/11/18 for acute on chronic diastolic heart failure. She presented with complaints of left chest burning sensation and respiratory distress/pulmonary edema. Her blood pressure was elevated. Her troponin was 0.15 and her NT pro-BNP was 11,717. She is a 74year old female with history of hyperlipidemia, diabetes, COPD, ischemic cardiomyopathy, morbid obesity, tobacco use, and CAD (s/p STEMI code/PEA/respiratory failure for which she underwent YADIRA to proximal LAD with Resolute stent on 5/20/18. She developed acute stent restenosis shortly after the initial PCI which required emergent intubation for pulmonary edema and she underwent a second catheterization for PCI/YADIRA to the proximal LAD. An echo done during her hospitalization showed an EF of 35%, severe hypokinesis of the entire anterior, basal-mid anteroseptal, apical septal, apical lateral and apical walls, and RVSP of 44 mmHg, and mild MR. The cardiac catheterization done (for acute anterolateral MI) on 5/20/18 showed the following:   Coronary angiography:  Dominance: Right  LM: Normal  LAD: High proximal 90-95% tubular stenosis, D1: ostial 50%, small to medium vessel  LCX: / OM: Normal  RCA: Dominant, mid and distal 20-30% otherwise normal  S/P PCI and stenting of proximal LAD 90-95% stenosis using 3.5 X 18 mm XIENCE YADIRA    The cardiac catheterization done (for acute pulmonary edema and ST elevations on EKG) on 5/21/18 showed the following:   Proximal edge stent thrombosis noted. Concern was possible dissection which may have caused by proximal stent   The  lesion in the LAD coronary artery was crossed with a 0.014\" Pro-water guide wire without difficulty. BMW wire placed in LCx   Pre-dilation was performed using  3.0 X 12 mm EMERGE balloon.    * PCI and stenting of Ostial LAD using 3.0 X 8 mm RESOLUTE LAINA stent ( overlapping with proximal stent)   * PCI and stenting of mid LAD using 2.75 X 8 mm RESOLUTE LAINA stent ( overlapping with proximal stent)   Post-dilation of ostial LAD stent and distal stent portion of stents were done by 3.25 X 12 mm NC and mid stent was post-dilated with 3.5 X 12 mm NC ballon    Intracoronary NTG was used as needed during the procedure. ISRRAEL 0 flow before and ISRRAEL 3 flow after procedure   Stenosis reduction from 100% to 0%    She states that she feels \"okay. \"  Denies chest pain, tightness, heaviness, and palpitations. She admits to some chronic shortness of breath with exertion, has 2-3 pillow orthopnea and occasional PND. Denies abdominal bloating. Denies lightheadedness, dizziness, and syncope. Denies lower extremity edema and claudication. Denies nausea, vomiting, diarrhea, melena, hematochezia. Denies hematuria, urgency, frequency. Denies fever, chills. She states that she is taking her medications as prescribed. PMH:  Past Medical History:   Diagnosis Date    Abnormal WBC count 5/17/2016    Bilateral shoulder pain 9/27/2016    Chondromalacia of both patellae     Chronic lung disease     Chronic obstructive pulmonary disease ([facility] 5/17/2016    COPD (chronic obstructive pulmonary disease) ([facility] 9/2015    mild-mod dz; Dr Stevens Sensing    Diabetes [facility]) 2013    Diabetes mellitus ([facility]     Diabetic eye exam ([facility] 2016    Dilated cardiomyopathy ([facility]     Dyslipidemia     History of echocardiogram 11/14/2014    Mild LVE. EF 40%. Mild, diffuse hypk. Mild LAE. Mild MR.      Hypercholesteremia 1/08/14    Hypertension 2000    Noncompliance with medications 2/16/2016    Obesity     Orthostatic hypotension 5/17/2016    Osteoarthritis of both knees     Pain management 04/01/2016    Dr. Lamine Cherry     Popliteal cyst, bilateral      Vitamin D deficiency 10/16/14       PSH:  History reviewed. No pertinent surgical history.     MEDS:  Current Outpatient Prescriptions   Medication Sig    raNITIdine (ZANTAC) 150 mg tablet Take 1 Tab by mouth two (2) times a day.  carvedilol (COREG) 6.25 mg tablet Take 1 Tab by mouth two (2) times daily (with meals).  atorvastatin (LIPITOR) 20 mg tablet Take 1 Tab by mouth nightly.  aspirin 81 mg chewable tablet Take 1 Tab by mouth daily.  ticagrelor (BRILINTA) 90 mg tablet Take 1 Tab by mouth two (2) times a day.  oxyCODONE-acetaminophen (PERCOCET) 5-325 mg per tablet Take 1 Tab by mouth every six (6) hours as needed. Max Daily Amount: 4 Tabs.  insulin detemir (LEVEMIR) 100 unit/mL injection 15 units daily for diabetes    albuterol (PROVENTIL HFA, VENTOLIN HFA, PROAIR HFA) 90 mcg/actuation inhaler Take 2 Puffs by inhalation every four (4) hours as needed for Wheezing.  omega-3 acid ethyl esters (LOVAZA) 1 gram capsule Take 2 Caps by mouth daily (with breakfast).  magnesium oxide (MAG-OX) 400 mg tablet Take 1 Tab by mouth daily. For low magnesium    desloratadine (CLARINEX) 5 mg tablet Take 1 Tab by mouth daily as needed for Allergies.  mometasone (NASONEX) 50 mcg/actuation nasal spray 2 Sprays by Both Nostrils route daily as needed. For allergies    umeclidinium-vilanterol (ANORO ELLIPTA) 62.5-25 mcg/actuation inhaler Take 1 Puff by inhalation daily. For COPD    esomeprazole (NEXIUM) 40 mg capsule Take 1 Cap by mouth daily as needed. For reflux    albuterol (PROVENTIL VENTOLIN) 2.5 mg /3 mL (0.083 %) nebulizer solution 3 mL by Nebulization route every four (4) hours as needed for Wheezing.  ferrous sulfate 325 mg (65 mg iron) tablet Take 1 Tab by mouth two (2) times a day. Indications: IRON DEFICIENCY ANEMIA    furosemide (LASIX) 40 mg tablet 1 tab po daily  START DATE 6/14/2018     No current facility-administered medications for this visit.         Allergies and Sensitivities:  No Known Allergies    Family History:  Family History   Problem Relation Age of Onset    Diabetes Mother     Hypertension Mother     Hypertension Father  Kidney Disease Maternal Aunt 50     Dialysis       Social History:  She  reports that she quit smoking about 4 weeks ago. Her smoking use included Cigarettes. She has a 12.00 pack-year smoking history. She has never used smokeless tobacco.  She  reports that she does not drink alcohol. Physical:  Visit Vitals    /88    Pulse 85    Ht 5' 4\" (1.626 m)    Wt 92.5 kg (204 lb)    SpO2 98%    BMI 35.02 kg/m2           Exam:  Neck:  Supple, no JVD, no carotid bruits  CV:  Normal S1 and  S2, no murmurs, rubs, or gallops noted  Lungs:  Clear to ausculation throughout, no wheezes or rales  Abd:  Soft, non-tender, non-distended with good bowel sounds. No hepatosplenomegaly  Extremities:  No edema      Data:  EKG:    Normal sinus rhythm, rate 85.  Cannot exclude high lateral MI, age indeterminate.  T-wave inversions anterolaterally and high lateral leads, consider lateral wall ischemia. LABS:  Lab Results   Component Value Date/Time    Sodium 139 06/15/2018 09:26 AM    Potassium 4.7 06/15/2018 09:26 AM    Chloride 105 06/15/2018 09:26 AM    CO2 27 06/15/2018 09:26 AM    Glucose 153 (H) 06/15/2018 09:26 AM    BUN 23 (H) 06/15/2018 09:26 AM    Creatinine 1.58 (H) 06/15/2018 09:26 AM     Lab Results   Component Value Date/Time    Cholesterol, total 291 (H) 05/21/2018 06:23 AM    HDL Cholesterol 52 05/21/2018 06:23 AM    LDL, calculated 212.6 (H) 05/21/2018 06:23 AM    Triglyceride 132 05/21/2018 06:23 AM    CHOL/HDL Ratio 5.6 (H) 05/21/2018 06:23 AM     Lab Results   Component Value Date/Time    ALT (SGPT) 28 06/08/2018 06:30 PM         Impression/Plan:  1. Chronic systolic heart failure, appears compensated  2. CAD, s/p STEMI code/PEA/respiratory failure for which she underwent YADIRA to proximal LAD with Resolute stent on 5/20/18.  She developed acute stent restenosis shortly after the initial PCI which required emergent intubation for pulmonary edema and she underwent a second catheterization for PCI/YADIRA to the proximal LAD. 3.  Morbid obesity  4. Hyperlipidemia, on atorvastatin 20mg  5. Diabetes mellitus, recommend Hgb A1c less than 7% from cardiac standpoint    Ms. Silvia Lee was seen today for a post-hospital follow-up. She was hospitalized from 6/8/18 thrugh 6/11/18 for acute on chronic diastolic heart failure. Prior to that hospitalization, she was hospitalized in mid May 2018 s/p STEMI code/PEA/respiratory failure for which she underwent YADIRA to proximal LAD with Resolute stent on 5/20/18. She developed acute stent restenosis shortly after the initial PCI which required emergent intubation for pulmonary edema and she underwent a second catheterization for PCI/YADIRA to the proximal LAD. She states that she is feeling [place]. \"  She offers no complaints of chest pain, palpitations, lightheadedness, or dizziness. She has some mild chronic shortness of breath with exertion. She has not noticed any abdominal bloating or lower extremity edema. Her blood pressure is stable as is her heart rate. During her most recent hospitalization, consideration was given to starting her on Entresto once her renal function improved. Her most recent BMP results are noted above. She is currently not on an ACEI or ARB. At this time, she will be started on Entresto 24/26 BID. Medication teaching done with her today. She was given a lab slip for a BMP to be done prior to returning for follow-up with me in 2 weeks. The remainder of her medications are to remain the same. Her cardiac medication regimen currently includes lasix, carvedilol, atorvastatin, ASA, Brilinta. She is aware of the importance of taking her medications as prescribed and taking the Brilinta and ASA without fail in view of her recent percutaneous interventions and stents. Congestive heart failure teaching reinforced today.   Advised to limit sodium intake to no more than 2000mg per day and to also limit fluid intake to 48 ounces per day (unless otherwise specified). Advised to weigh daily every morning and record weights. Instructed to call our office if progressive weight gain is noted over a 2 to 3 day period of time, shortness of breath increases, or if abdominal bloating, nausea, fatigue, or increased lower extremity edema is noted. Patient education material re:  CHF, sodium/fluid restrictions, and low sodium diet attached to her after visit summary. Greater than 50% of a 40 minute visit was spent in discussion, counseling, and answering questions. She will follow-up with Dr. Joseph Martin as scheduled and as needed. Anil Sanders MSN, FNP-BC    Please note:  Portions of this chart were created with Dragon medical speech to text program.  Unrecognized errors may be present.

## 2018-06-21 NOTE — PROGRESS NOTES
1. Have you been to the ER, urgent care clinic since your last visit? Hospitalized since your last visit? Yes 06/08/2018 for SOB     2. Have you seen or consulted any other health care providers outside of the [address] [place] [address] since your last visit? Include any pap smears or colon screening.  No

## 2018-06-21 NOTE — MR AVS SNAPSHOT
[address] [place] [address] 
[phone] Patient: Tawanna Barros MRN: [id number] :1950 Visit Information Date & Time Provider Department Dept. Phone Encounter #  
 2018  3:00 PM Shellie Coto NP [facility] [address] [id number] Your Appointments 2018 10:30 AM  
Follow Up with Shellie Coto NP [facility] [address] ([facility]) Appt Note: 2 week f/u after Entresto started [place] [phone]  
[phone] [address]  
  
    
 2018 11:00 AM  
New Patient with Jessie Lemus MD  
[facility] ([facility]) Appt Note: np post menapause bleeding , please arrive 15 min early, bring list of meds, insurance info . [facility] [place] [address] [facility]  
  
   
 [facility], [address] [place] [address]  
  
    
 2018 11:20 AM  
POST H[facility]PITAL with Beverly Serrano MD  
[facility] ([facility]) Appt Note: PH f/u; Darlin Anne has been seeing for med titration [place] [id number] [phone] 2300 [facility] [address] Upcoming Health Maintenance Date Due  
 EYE EXAM RETINAL OR DILATED Q1 2017 MICROALBUMIN Q1 2017 FOOT EXAM Q1 2018 FOBT Q 1 YEAR AGE 50-75 2018 MEDICARE YEARLY EXAM 3/20/2018 GLAUCOMA SCREENING Q2Y 2018 Pneumococcal 65+ Low/Medium Risk (1 of 2 - PCV13) 2018* Influenza Age 5 to Adult 2018 HEMOGLOBIN A1C Q6M 12/10/2018 LIPID PANEL Q1 2019 BREAST CANCER SCRN MAMMOGRAM 2019 DTaP/Tdap/Td series (2 - Td) 2024 *Topic was postponed. The date shown is not the original due date. Allergies as of 6/21/2018  Review Complete On: 6/21/2018 By: Carmelina Current, NP No Known Allergies Current Immunizations  Reviewed on 5/31/2018 Name Date Influenza Vaccine Domitilazeina Acevedo) 10/22/2015 Influenza Vaccine (Quad) PF 10/19/2016 Influenza Vaccine (Trivalent) 10/16/2014 Influenza Vaccine PF 1/8/2014 Pneumococcal Polysaccharide (PPSV-23) 8/27/2015  1:01 PM  
 Rabies Immune Globulin 6/26/2014  1:49 PM  
 Rabies Vaccine IM 7/10/2014 10:59 AM, 7/3/2014 10:55 AM, 6/29/2014  9:28 AM, 6/26/2014  1:46 PM  
 Tdap 6/26/2014  1:06 PM  
  
 Not reviewed this visit You Were Diagnosed With   
  
 Codes Comments Coronary artery disease involving native coronary artery of native heart without angina pectoris    -  Primary ICD-10-CM: I25.10 ICD-9-CM: 414.01 Chest pain, unspecified type     ICD-10-CM: R07.9 ICD-9-CM: 786.50 SOB (shortness of breath)     ICD-10-CM: R06.02 
ICD-9-CM: 786.05 Chronic systolic congestive heart failure (HCC)     ICD-10-CM: I50.22 ICD-9-CM: 428.22, 428.0 Vitals BP Pulse Height(growth percentile) Weight(growth percentile) SpO2 BMI  
 140/88 85 5' 4\" (1.626 m) 204 lb (92.5 kg) 98% 35.02 kg/m2 OB Status Smoking Status Menopause Former Smoker Vitals History BMI and BSA Data Body Mass Index Body Surface Area 35.02 kg/m 2 2.04 m 2 Preferred Pharmacy Pharmacy Name Phone [address], [address] [place] [address] [phone] Your Updated Medication List  
  
   
This list is accurate as of 6/21/18  3:45 PM.  Always use your most recent med list.  
  
  
  
  
 * albuterol 2.5 mg /3 mL (0.083 %) nebulizer solution Commonly known as:  PROVENTIL VENTOLIN  
3 mL by Nebulization route every four (4) hours as needed for Wheezing. * albuterol 90 mcg/actuation inhaler Commonly known as:  PROVENTIL HFA, VENTOLIN HFA, PROAIR HFA Take 2 Puffs by inhalation every four (4) hours as needed for Wheezing. ARAVA 20 mg tablet Generic drug:  leflunomide Take 20 mg by mouth daily. aspirin 81 mg chewable tablet Take 1 Tab by mouth daily. atorvastatin 20 mg tablet Commonly known as:  LIPITOR Take 1 Tab by mouth nightly. carvedilol 6.25 mg tablet Commonly known as:  Joe Dole Take 1 Tab by mouth two (2) times daily (with meals). desloratadine 5 mg tablet Commonly known as:  CLARINEX Take 1 Tab by mouth daily as needed for Allergies. ferrous sulfate 325 mg (65 mg iron) tablet Take 1 Tab by mouth two (2) times a day. Indications: IRON DEFICIENCY ANEMIA  
  
 furosemide 40 mg tablet Commonly known as:  LASIX  
1 tab po daily  START DATE 6/14/2018  
  
 insulin detemir U-100 100 unit/mL injection Commonly known as:  LEVEMIR U-100 INSULIN  
15 units daily for diabetes  
  
 mometasone 50 mcg/actuation nasal spray Commonly known as:  NASONEX  
2 Sprays by Both Nostrils route daily as needed. For allergies  
  
 oxyCODONE-acetaminophen 5-325 mg per tablet Commonly known as:  PERCOCET Take 1 Tab by mouth every six (6) hours as needed. Max Daily Amount: 4 Tabs. raNITIdine 150 mg tablet Commonly known as:  ZANTAC Take 1 Tab by mouth two (2) times a day. ticagrelor 90 mg tablet Commonly known as:  [employer] Take 1 Tab by mouth two (2) times a day. umeclidinium-vilanterol 62.5-25 mcg/actuation inhaler Commonly known as:  Iqra Call Take 1 Puff by inhalation daily. For COPD * Notice: This list has 2 medication(s) that are the same as other medications prescribed for you. Read the directions carefully, and ask your doctor or other care provider to review them with you. Prescriptions Sent to Pharmacy  Refills  
 carvedilol (COREG) 6.25 mg tablet 6  
 Sig: Take 1 Tab by mouth two (2) times daily (with meals). Class: Normal  
 Pharmacy: [id number], [address] [facility] Ph #: [phone] Route: Oral  
 furosemide (LASIX) 40 mg tablet 6 Si tab po daily  START DATE 2018 Class: Normal  
 Pharmacy: [id number] [facility], [address] [facility] Ph #: [phone]  
 ticagrelor (BRILINTA) 90 mg tablet 11 Sig: Take 1 Tab by mouth two (2) times a day. Class: Normal  
 Pharmacy: [id number] [facility], [address] [facility] Ph #: [phone] Route: Oral  
  
We Performed the Following AMB POC EKG ROUTINE  LEADS, INTER & REP [22728 CPT(R)] To-Do List   
 2018 Lab:  METABOLIC PANEL, BASIC Patient Instructions Begin Entresto  twice a day All other medications to remain the same BMP to be done prior to returning for follow-up Follow-up with Laila Mcmullen in 2 weeks Weigh daily and record Limit sodium intake to 2000mg per day Limit fluid intake to no more than  6, eight ounce glasses of any type of fluids per day (total of 48 ounces per day) Call if you notice sudden or progressive weight gain (3-5 pounds in 2-3 days), increasing shortness of breath, abdominal bloating, increasing lower extremity edema, inability to lie flat or on your normal number of pillows, having to sit up to catch your breath, fatigue, increased somnolence (sleeping more), poor appetite Heart Failure: Care Instructions Your Care Instructions Heart failure occurs when your heart does not pump as much blood as the body needs. Failure does not mean that the heart has stopped pumping but rather that it is not pumping as well as it should. Over time, this causes fluid buildup in your lungs and other parts of your body.  Fluid buildup can cause shortness of breath, fatigue, swollen ankles, and other problems. By taking medicines regularly, reducing sodium (salt) in your diet, checking your weight every day, and making lifestyle changes, you can feel better and live longer. Follow-up care is a key part of your treatment and safety. Be sure to make and go to all appointments, and call your doctor if you are having problems. It's also a good idea to know your test results and keep a list of the medicines you take. How can you care for yourself at home? Medicines ? · Be safe with medicines. Take your medicines exactly as prescribed. Call your doctor if you think you are having a problem with your medicine. ? · Do not take any vitamins, over-the-counter medicine, or herbal products without talking to your doctor first. Rebekah Lomeliical not take ibuprofen (Advil or Motrin) and naproxen (Aleve) without talking to your doctor first. They could make your heart failure worse. ? · You may be taking some of the following medicine. ¨ Beta-blockers can slow heart rate, decrease blood pressure, and improve your condition. Taking a beta-blocker may lower your chance of needing to be hospitalized. ¨ Angiotensin-converting enzyme inhibitors (ACEIs) reduce the heart's workload, lower blood pressure, and reduce swelling. Taking an ACEI may lower your chance of needing to be hospitalized again. ¨ Angiotensin II receptor blockers (ARBs) work like ACEIs. Your doctor may prescribe them instead of ACEIs. ¨ Diuretics, also called water pills, reduce swelling. ¨ Potassium supplements replace this important mineral, which is sometimes lost with diuretics. ¨ Aspirin and other blood thinners prevent blood clots, which can cause a stroke or heart attack. ? You will get more details on the specific medicines your doctor prescribes. Diet ? · Your doctor may suggest that you limit sodium to 2,000 milligrams (mg) a day or less.  That is less than 1 teaspoon of salt a day, including all the salt you eat in cooking or in packaged foods. People get most of their sodium from processed foods. Fast food and restaurant meals also tend to be very high in sodium. ? · Ask your doctor how much liquid you can drink each day. You may have to limit liquids. ?Weight ? · Weigh yourself without clothing at the same time each day. Record your weight. Call your doctor if you have a sudden weight gain, such as more than 2 to 3 pounds in a day or 5 pounds in a week. (Your doctor may suggest a different range of weight gain.) A sudden weight gain may mean that your heart failure is getting worse. ? Activity level ? · Start light exercise (if your doctor says it is okay). Even if you can only do a small amount, exercise will help you get stronger, have more energy, and manage your weight and your stress. Walking is an easy way to get exercise. Start out by walking a little more than you did before. Bit by bit, increase the amount you walk. ? · When you exercise, watch for signs that your heart is working too hard. You are pushing yourself too hard if you cannot talk while you are exercising. If you become short of breath or dizzy or have chest pain, stop, sit down, and rest.  
? · If you feel \"wiped out\" the day after you exercise, walk slower or for a shorter distance until you can work up to a better pace. ? · Get enough rest at night. Sleeping with 1 or 2 pillows under your upper body and head may help you breathe easier. ? Lifestyle changes ? · Do not smoke. Smoking can make a heart condition worse. If you need help quitting, talk to your doctor about stop-smoking programs and medicines. These can increase your chances of quitting for good. Quitting smoking may be the most important step you can take to protect your heart. ? · Limit alcohol to 2 drinks a day for men and 1 drink a day for women. Too much alcohol can cause health problems. ? · Avoid getting sick from colds and the flu. Get a pneumococcal vaccine shot. If you have had one before, ask your doctor whether you need another dose. Get a flu shot each year. If you must be around people with colds or the flu, wash your hands often. When should you call for help? Call 911 if you have symptoms of sudden heart failure such as: 
? · You have severe trouble breathing. ? · You cough up pink, foamy mucus. ? · You have a new irregular or rapid heartbeat. ?Call your doctor now or seek immediate medical care if: 
? · You have new or increased shortness of breath. ? · You are dizzy or lightheaded, or you feel like you may faint. ? · You have sudden weight gain, such as more than 2 to 3 pounds in a day or 5 pounds in a week. (Your doctor may suggest a different range of weight gain.) ? · You have increased swelling in your legs, ankles, or feet. ? · You are suddenly so tired or weak that you cannot do your usual activities. ? Watch closely for changes in your health, and be sure to contact your doctor if you develop new symptoms. Where can you learn more? Go to http://jason-charity.info/. Enter X522 in the search box to learn more about \"Heart Failure: Care Instructions. \" Current as of: September 21, 2016 Content Version: 11.4 © 6599-2695 Match[employer]. Care instructions adapted under license by [employer]ac[employer] (which disclaims liability or warranty for this information). If you have questions about a medical condition or this instruction, always ask your healthcare professional. [place] [address] any warranty or liability for your use of this information. Limiting Sodium and Fluids With Heart Failure: Care Instructions Your Care Instructions Sodium causes your body to hold on to extra water. This may cause your heart failure symptoms to get worse.  Limiting sodium may help you feel better and lower your risk of having to go to the hospital. 
People get most of their sodium from processed foods. Fast food and restaurant meals also tend to be very high in sodium. Your doctor may suggest that you limit sodium to 2,000 milligrams (mg) a day or less. That is less than 1 teaspoon of salt a day, including all the salt you eat in cooked or packaged foods. Usually, you have to limit the amount of liquids you drink only if your heart failure is severe. Limiting sodium alone often is enough to help your body get rid of extra fluids. However, your doctor may tell you to limit your fluid intake to a set amount each day. Follow-up care is a key part of your treatment and safety. Be sure to make and go to all appointments, and call your doctor if you are having problems. It's also a good idea to know your test results and keep a list of the medicines you take. How can you care for yourself at home? Read food labels · Read food labels on cans and food packages. The labels tell you how much sodium is in each serving. Make sure that you look at the serving size. If you eat more than the serving size, you have eaten more sodium than is listed for one serving. · Food labels also tell you the Percent Daily Value. If the Percent Daily Value says 50%, it means that you will get at least 50% of all the sodium you need for the entire day in one serving. Choose products with low Percent Daily Values for sodium. · Be aware that sodium can come in forms other than salt, including monosodium glutamate (MSG), sodium citrate, and sodium bicarbonate (baking soda). MSG is often added to Asian food. You can sometimes ask for food without MSG or salt. Buy low-sodium foods · Buy foods that are labeled \"unsalted\" (no salt added), \"sodium-free\" (less than 5 mg of sodium per serving), or \"low-sodium\" (less than 140 mg of sodium per serving).  A food labeled \"light sodium\" has less than half of the full-sodium version of that food. Foods labeled \"reduced-sodium\" may still have too much sodium. · Buy fresh vegetables or plain, frozen vegetables. Buy low-sodium versions of canned vegetables, soups, and other canned goods. Prepare low-sodium meals · Use less salt each day when cooking. Reducing salt in this way will help you adjust to the taste. Do not add salt after cooking. Take the salt shaker off the table. · Flavor your food with garlic, lemon juice, onion, vinegar, herbs, and spices instead of salt. Do not use soy sauce, steak sauce, onion salt, garlic salt, mustard, or ketchup on your food. · Make your own salad dressings, sauces, and ketchup without adding salt. · Use less salt (or none) when recipes call for it. You can often use half the salt a recipe calls for without losing flavor. Other dishes like rice, pasta, and grains do not need added salt. · Rinse canned vegetables. This removes some-but not all-of the salt. · Avoid water that has a naturally high sodium content or that has been treated with water softeners, which add sodium. Call your local water company to find out the sodium content of your water supply. If you buy bottled water, read the label and choose a sodium-free brand. Avoid high-sodium foods, such as: 
· Smoked, cured, salted, and canned meat, fish, and poultry. · Ham, pennington, hot dogs, and luncheon meats. · Regular, hard, and processed cheese and regular peanut butter. · Crackers with salted tops. · Frozen prepared meals. · Canned and dried soups, broths, and bouillon, unless labeled sodium-free or low-sodium. · Canned vegetables, unless labeled sodium-free or low-sodium. · Salted snack foods such as chips and pretzels. · Western Kylie fries, pizza, tacos, and other fast foods. · Pickles, olives, ketchup, and other condiments, especially soy sauce, unless labeled sodium-free or low-sodium. If you cannot cook for yourself · Have family members or friends help you, or have someone cook low-sodium meals. · Check with your local senior nutrition program to find out where meals are served and whether they offer a low-sodium option. You can often find these programs through your local health department or hospital. 
· Have meals delivered to your home. Most [facility] have a Meals on NIKOLAS Venegas. These programs provide one hot meal a day for older adults, delivered to their homes. Ask whether these meals are low-sodium. Let them know that you are on a low-sodium diet. Limiting fluid intake · Find a method that works for you. You might simply write down how much you drink every time you do. Some people keep a container filled with the amount of fluid allowed for that day. If they drink from a source other than the container, then they pour out that amount. · Measure your regular drinking glasses to find out how much fluid each one holds. Once you know this, you will not have to measure every time. · Besides water, milk, juices, and other drinks, some foods have a lot of fluid. Count any foods that will melt (such as ice cream or gelatin dessert) or liquid foods (such as soup) as part of your fluid intake for the day. Where can you learn more? Go to http://jason-charity.info/. Enter A166 in the search box to learn more about \"Limiting Sodium and Fluids With Heart Failure: Care Instructions. \" Current as of: September 21, 2016 Content Version: 11.4 © 7909-2448 Onovative. Care instructions adapted under license by One[employer] (which disclaims liability or warranty for this information). If you have questions about a medical condition or this instruction, always ask your healthcare professional. [place] [address] any warranty or liability for your use of this information. Low Sodium Diet (2,000 Milligram): Care Instructions Your Care Instructions Too much sodium causes your body to hold on to extra water. This can raise your blood pressure and force your heart and kidneys to work harder. In very serious cases, this could cause you to be put in the hospital. It might even be life-threatening. By limiting sodium, you will feel better and lower your risk of serious problems. The most common source of sodium is salt. People get most of the salt in their diet from canned, prepared, and packaged foods. Fast food and restaurant meals also are very high in sodium. Your doctor will probably limit your sodium to less than 2,000 milligrams (mg) a day. This limit counts all the sodium in prepared and packaged foods and any salt you add to your food. Follow-up care is a key part of your treatment and safety. Be sure to make and go to all appointments, and call your doctor if you are having problems. It's also a good idea to know your test results and keep a list of the medicines you take. How can you care for yourself at home? Read food labels · Read labels on cans and food packages. The labels tell you how much sodium is in each serving. Make sure that you look at the serving size. If you eat more than the serving size, you have eaten more sodium. · Food labels also tell you the Percent Daily Value for sodium. Choose products with low Percent Daily Values for sodium. · Be aware that sodium can come in forms other than salt, including monosodium glutamate (MSG), sodium citrate, and sodium bicarbonate (baking soda). MSG is often added to Asian food. When you eat out, you can sometimes ask for food without MSG or added salt. Buy low-sodium foods · Buy foods that are labeled \"unsalted\" (no salt added), \"sodium-free\" (less than 5 mg of sodium per serving), or \"low-sodium\" (less than 140 mg of sodium per serving). Foods labeled \"reduced-sodium\" and \"light sodium\" may still have too much sodium. Be sure to read the label to see how much sodium you are getting. · Buy fresh vegetables, or frozen vegetables without added sauces. Buy low-sodium versions of canned vegetables, soups, and other canned goods. Prepare low-sodium meals · Cut back on the amount of salt you use in cooking. This will help you adjust to the taste. Do not add salt after cooking. One teaspoon of salt has about 2,300 mg of sodium. · Take the salt shaker off the table. · Flavor your food with garlic, lemon juice, onion, vinegar, herbs, and spices. Do not use soy sauce, lite soy sauce, steak sauce, onion salt, garlic salt, celery salt, mustard, or ketchup on your food. · Use low-sodium salad dressings, sauces, and ketchup. Or make your own salad dressings and sauces without adding salt. · Use less salt (or none) when recipes call for it. You can often use half the salt a recipe calls for without losing flavor. Other foods such as rice, pasta, and grains do not need added salt. · Rinse canned vegetables, and cook them in fresh water. This removes some-but not all-of the salt. · Avoid water that is naturally high in sodium or that has been treated with water softeners, which add sodium. Call your local water company to find out the sodium content of your water supply. If you buy bottled water, read the label and choose a sodium-free brand. Avoid high-sodium foods · Avoid eating: ¨ Smoked, cured, salted, and canned meat, fish, and poultry. ¨ Ham, pennington, hot dogs, and luncheon meats. ¨ Regular, hard, and processed cheese and regular peanut butter. ¨ Crackers with salted tops, and other salted snack foods such as pretzels, chips, and salted popcorn. ¨ Frozen prepared meals, unless labeled low-sodium. ¨ Canned and dried soups, broths, and bouillon, unless labeled sodium-free or low-sodium. ¨ Canned vegetables, unless labeled sodium-free or low-sodium. ¨ Western Kylie fries, pizza, tacos, and other fast foods.  
¨ Pickles, olives, ketchup, and other condiments, especially soy sauce, unless labeled sodium-free or low-sodium. Where can you learn more? Go to http://jason-charity.info/. Enter D597 in the search box to learn more about \"Low Sodium Diet (2,000 Milligram): Care Instructions. \" Current as of: May 12, 2017 Content Version: 11.4 © 3899-3350 [employer]. Care instructions adapted under license by [employer]rbn[employer]z (which disclaims liability or warranty for this information). If you have questions about a medical condition or this instruction, always ask your healthcare professional. [facility] any warranty or liability for your use of this information. Introducing [facility]! Cyndi Winston introduces [employer] patient portal. Now you can access parts of your medical record, email your doctor's office, and request medication refills online. 1. In your internet browser, go to [email or web address] 2. Click on the First Time User? Click Here link in the Sign In box. You will see the New Member Sign Up page. 3. Enter your [employer] Access Code exactly as it appears below. You will not need to use this code after youve completed the sign-up process. If you do not sign up before the expiration date, you must request a new code. · [employer] Access Code: [email or web address] Expires: 8/18/2018  7:44 PM 
 
4. Enter the last four digits of your Social Security Number (xxxx) and Date of Birth (mm/dd/yyyy) as indicated and click Submit. You will be taken to the next sign-up page. 5. Create a Food Reportert ID. This will be your [employer] login ID and cannot be changed, so think of one that is secure and easy to remember. 6. Create a Food Reportert password. You can change your password at any time. 7. Enter your Password Reset Question and Answer. This can be used at a later time if you forget your password. 8. Enter your e-mail address.  You will receive e-mail notification when new information is available in [employer]. 9. Click Sign Up. You can now view and download portions of your medical record. 10. Click the Download Summary menu link to download a portable copy of your medical information. If you have questions, please visit the Frequently Asked Questions section of the [employer] website. Remember, [employer] is NOT to be used for urgent needs. For medical emergencies, dial 911. Now available from your iPhone and Android! Please provide this summary of care documentation to your next provider. Your primary care clinician is listed as Hortencia Kan. If you have any questions after today's visit, please call [phone].

## 2018-07-03 ENCOUNTER — HOSPITAL ENCOUNTER (OUTPATIENT)
Dept: LAB | Age: 68
Discharge: HOME | End: 2018-07-03
Payer: MEDICAID

## 2018-07-03 DIAGNOSIS — I50.22 CHRONIC SYSTOLIC CONGESTIVE HEART FAILURE (HCC): ICD-10-CM

## 2018-07-03 DIAGNOSIS — I25.10 CORONARY ARTERY DISEASE INVOLVING NATIVE CORONARY ARTERY OF NATIVE HEART WITHOUT ANGINA PECTORIS: ICD-10-CM

## 2018-07-03 LAB
ANION GAP SERPL CALC-SCNC: 10 MMOL/L (ref 3–18)
BUN SERPL-MCNC: 23 MG/DL (ref 7–18)
BUN/CREAT SERPL: 13 (ref 12–20)
CALCIUM SERPL-MCNC: 9.3 MG/DL (ref 8.5–10.1)
CHLORIDE SERPL-SCNC: 107 MMOL/L (ref 100–108)
CO2 SERPL-SCNC: 24 MMOL/L (ref 21–32)
CREAT SERPL-MCNC: 1.73 MG/DL (ref 0.6–1.3)
GLUCOSE SERPL-MCNC: 243 MG/DL (ref 74–99)
POTASSIUM SERPL-SCNC: 4.5 MMOL/L (ref 3.5–5.5)
SODIUM SERPL-SCNC: 141 MMOL/L (ref 136–145)

## 2018-07-03 PROCEDURE — 36415 COLL VENOUS BLD VENIPUNCTURE: CPT | Performed by: NURSE PRACTITIONER

## 2018-07-03 PROCEDURE — 80048 BASIC METABOLIC PNL TOTAL CA: CPT | Performed by: NURSE PRACTITIONER

## 2018-07-12 ENCOUNTER — OFFICE VISIT (OUTPATIENT)
Dept: CARDIOLOGY CLINIC | Age: 68
End: 2018-07-12

## 2018-07-12 VITALS
BODY MASS INDEX: 36.02 KG/M2 | HEART RATE: 99 BPM | WEIGHT: 211 LBS | OXYGEN SATURATION: 98 % | SYSTOLIC BLOOD PRESSURE: 132 MMHG | DIASTOLIC BLOOD PRESSURE: 78 MMHG | HEIGHT: 64 IN

## 2018-07-12 DIAGNOSIS — E11.21 TYPE 2 DIABETES WITH NEPHROPATHY (HCC): ICD-10-CM

## 2018-07-12 DIAGNOSIS — I10 ESSENTIAL HYPERTENSION: ICD-10-CM

## 2018-07-12 DIAGNOSIS — I42.0 CARDIOMYOPATHY, DILATED (HCC): ICD-10-CM

## 2018-07-12 DIAGNOSIS — I25.10 CORONARY ARTERY DISEASE INVOLVING NATIVE CORONARY ARTERY OF NATIVE HEART WITHOUT ANGINA PECTORIS: ICD-10-CM

## 2018-07-12 DIAGNOSIS — I50.22 CHRONIC SYSTOLIC CONGESTIVE HEART FAILURE (HCC): Primary | ICD-10-CM

## 2018-07-12 PROBLEM — E66.01 SEVERE OBESITY (BMI 35.0-39.9): Status: ACTIVE | Noted: 2018-07-12

## 2018-07-12 RX ORDER — RANITIDINE 150 MG/1
150 TABLET, FILM COATED ORAL 2 TIMES DAILY
Qty: 60 TAB | Refills: 4 | Status: SHIPPED | OUTPATIENT
Start: 2018-07-12 | End: 2019-12-08

## 2018-07-12 NOTE — PROGRESS NOTES
Madeleine Ding presents today for follow-up after being started on Entresto. She reports that she did not start the medication as she states that she was told to have the lab work done prior to starting the medication and that the pharmacy did not have it. She was given a lab slip for a BMP during her last visit as she was supposed to have it done prior to returning for follow-up with me today (after starting the [facility]). She is a 74year old female with history of hyperlipidemia, diabetes, COPD, ischemic cardiomyopathy, morbid obesity, tobacco use, and CAD (s/p STEMI code/PEA/respiratory failure for which she underwent YADIRA to proximal LAD with Resolute stent on 5/20/18. She developed acute stent restenosis shortly after the initial PCI which required emergent intubation for pulmonary edema and she underwent a second catheterization for PCI/YADIRA to the proximal LAD. An echo done during her hospitalization showed an EF of 35%, severe hypokinesis of the entire anterior, basal-mid anteroseptal, apical septal, apical lateral and apical walls, and RVSP of 44 mmHg, and mild MR. The cardiac catheterization done (for acute anterolateral MI) on 5/20/18 showed the following:   Coronary angiography:  Dominance: Right  LM: Normal  LAD: High proximal 90-95% tubular stenosis, D1: ostial 50%, small to medium vessel  LCX: / OM: Normal  RCA: Dominant, mid and distal 20-30% otherwise normal  S/P PCI and stenting of proximal LAD 90-95% stenosis using 3.5 X 18 mm XIENCE YADIRA    The cardiac catheterization done (for acute pulmonary edema and ST elevations on EKG) on 5/21/18 showed the following:   Proximal edge stent thrombosis noted. Concern was possible dissection which may have caused by proximal stent   The  lesion in the LAD coronary artery was crossed with a 0.014\" Pro-water guide wire without difficulty. BMW wire placed in LCx   Pre-dilation was performed using  3.0 X 12 mm EMERGE balloon.    * PCI and stenting of Ostial LAD using 3.0 X 8 mm RESOLUTE LAINA stent ( overlapping with proximal stent)   * PCI and stenting of mid LAD using 2.75 X 8 mm RESOLUTE LAINA stent ( overlapping with proximal stent)   Post-dilation of ostial LAD stent and distal stent portion of stents were done by 3.25 X 12 mm NC and mid stent was post-dilated with 3.5 X 12 mm NC ballon    Intracoronary NTG was used as needed during the procedure. ISRRAEL 0 flow before and ISRRAEL 3 flow after procedure   Stenosis reduction from 100% to 0%    She was hospitalized from 6/8/18 thrugh 6/11/18 for acute on chronic diastolic heart failure. She presented with complaints of left chest burning sensation and respiratory distress/pulmonary edema. Her blood pressure was elevated. Her troponin was 0.15 and her NT pro-BNP was 11,717. She states that she feels [place]. \"  Denies chest pain, tightness, heaviness, and palpitations. She admits to some mild, chronic shortness of breath with exertion, has 2 pillow orthopnea and denies PND. Denies abdominal bloating. Denies lightheadedness, dizziness, and syncope. Denies lower extremity edema and claudication. Denies nausea, vomiting, diarrhea, melena, hematochezia. Denies hematuria, urgency, frequency. Denies fever, chills. She states that she is taking her medications as prescribed. PMH:  Past Medical History:   Diagnosis Date    Abnormal WBC count 5/17/2016    Bilateral shoulder pain 9/27/2016    Chondromalacia of both patellae     Chronic lung disease     Chronic obstructive pulmonary disease ([address] 5/17/2016    COPD (chronic obstructive pulmonary disease) ([address] 9/2015    mild-mod dz; Dr Liang Morales    Diabetes [facility]) 2013    Diabetes mellitus ([address]     Diabetic eye exam ([address] 2016    Dilated cardiomyopathy ([address]     Dyslipidemia     History of echocardiogram 11/14/2014    Mild LVE. EF 40%. Mild, diffuse hypk. Mild LAE.   Mild MR.      Hypercholesteremia 1/08/14    Hypertension 2000    Noncompliance with medications 2/16/2016    Obesity     Orthostatic hypotension 5/17/2016    Osteoarthritis of both knees     Pain management 04/01/2016    Dr. Dong Laguerre     Popliteal cyst, bilateral      Vitamin D deficiency 10/16/14       PSH:  History reviewed. No pertinent surgical history. MEDS:  Current Outpatient Prescriptions   Medication Sig    carvedilol (COREG) 6.25 mg tablet Take 1 Tab by mouth two (2) times daily (with meals).  leflunomide (ARAVA) 20 mg tablet Take 20 mg by mouth daily.  furosemide (LASIX) 40 mg tablet 1 tab po daily  START DATE 6/14/2018    ticagrelor (BRILINTA) 90 mg tablet Take 1 Tab by mouth two (2) times a day.  raNITIdine (ZANTAC) 150 mg tablet Take 1 Tab by mouth two (2) times a day.  atorvastatin (LIPITOR) 20 mg tablet Take 1 Tab by mouth nightly.  aspirin 81 mg chewable tablet Take 1 Tab by mouth daily.  insulin detemir (LEVEMIR) 100 unit/mL injection 15 units daily for diabetes    albuterol (PROVENTIL HFA, VENTOLIN HFA, PROAIR HFA) 90 mcg/actuation inhaler Take 2 Puffs by inhalation every four (4) hours as needed for Wheezing.  desloratadine (CLARINEX) 5 mg tablet Take 1 Tab by mouth daily as needed for Allergies.  mometasone (NASONEX) 50 mcg/actuation nasal spray 2 Sprays by Both Nostrils route daily as needed. For allergies    umeclidinium-vilanterol (ANORO ELLIPTA) 62.5-25 mcg/actuation inhaler Take 1 Puff by inhalation daily. For COPD    albuterol (PROVENTIL VENTOLIN) 2.5 mg /3 mL (0.083 %) nebulizer solution 3 mL by Nebulization route every four (4) hours as needed for Wheezing.  ferrous sulfate 325 mg (65 mg iron) tablet Take 1 Tab by mouth two (2) times a day. Indications: IRON DEFICIENCY ANEMIA     No current facility-administered medications for this visit.         Allergies and Sensitivities:  No Known Allergies    Family History:  Family History   Problem Relation Age of Onset    Diabetes Mother     Hypertension Mother  Hypertension Father     Kidney Disease Maternal Aunt 48     Dialysis       Social History:  She  reports that she quit smoking about 7 weeks ago. Her smoking use included Cigarettes. She has a 12.00 pack-year smoking history. She has never used smokeless tobacco.  She  reports that she does not drink alcohol. Physical:  Visit Vitals    /78    Pulse 99    Ht 5' 4\" (1.626 m)    Wt 95.7 kg (211 lb)    SpO2 98%    BMI 36.22 kg/m2           Exam:  Neck:  Supple, no JVD, no carotid bruits  CV:  Normal S1 and  S2, no murmurs, rubs, or gallops noted  Lungs:  Clear to ausculation throughout, no wheezes or rales  Abd:  Soft, non-tender, non-distended with good bowel sounds. No hepatosplenomegaly  Extremities:  No edema      Data:  EKG:    Not done today      LABS:  Lab Results   Component Value Date/Time    Sodium 141 07/03/2018 07:15 AM    Potassium 4.5 07/03/2018 07:15 AM    Chloride 107 07/03/2018 07:15 AM    CO2 24 07/03/2018 07:15 AM    Glucose 243 (H) 07/03/2018 07:15 AM    BUN 23 (H) 07/03/2018 07:15 AM    Creatinine 1.73 (H) 07/03/2018 07:15 AM     Lab Results   Component Value Date/Time    Cholesterol, total 291 (H) 05/21/2018 06:23 AM    HDL Cholesterol 52 05/21/2018 06:23 AM    LDL, calculated 212.6 (H) 05/21/2018 06:23 AM    Triglyceride 132 05/21/2018 06:23 AM    CHOL/HDL Ratio 5.6 (H) 05/21/2018 06:23 AM     Lab Results   Component Value Date/Time    ALT (SGPT) 28 06/08/2018 06:30 PM         Impression/Plan:  1. Chronic systolic heart failure, appears compensated  2. CAD, s/p STEMI code/PEA/respiratory failure for which she underwent YADIRA to proximal LAD with Resolute stent on 5/20/18. She developed acute stent restenosis shortly after the initial PCI which required emergent intubation for pulmonary edema and she underwent a second catheterization for PCI/YADIRA to the proximal LAD. 3.  Morbid obesity  4. Hyperlipidemia, on atorvastatin 20mg  5.   Diabetes mellitus, recommend Hgb A1c less than 7% from cardiac standpoint    Ms. Joshua Amador was seen today for follow-up after she was supposed to have started on Entresto 24/26. She misunderstood the instructions and had the BMP done and was waiting to receive results before she started the medication. She also states that the pharmacy did not have the medication. Her BMP results are noted above and her creatinine is at 1.7. I reviewed lab results with Dr. Jasmin Aragon who states that is is okay to start the medication. I reviewed results with her and she was instructed to begin Entresto 24/26 BID. She is not currently on an ACEI or ARB. She will have another BMP done in 2 weeks prior to returning for follow-up with me in 3 weeks. She was also instructed to decrease her lasix to 20mg daily when she begins the [place]. She states that she is feeling [place]. \"  She offers no complaints of chest pain, palpitations, lightheadedness, or dizziness. She has some mild chronic shortness of breath with exertion. She has not noticed any abdominal bloating or lower extremity edema. Her blood pressure is stable as is her heart rate. Congestive heart failure teaching reinforced today. Advised to limit sodium intake to no more than 2000mg per day and to also limit fluid intake to 48 ounces per day (unless otherwise specified). Advised to weigh daily every morning and record weights. Instructed to call our office if progressive weight gain is noted over a 2 to 3 day period of time, shortness of breath increases, or if abdominal bloating, nausea, fatigue, or increased lower extremity edema is noted. She will follow-up with Dr. Jasmin Aragon as scheduled and as needed. Jackie Velasquez MSN, FNP-BC    Please note:  Portions of this chart were created with Dragon medical speech to text program.  Unrecognized errors may be present.

## 2018-07-12 NOTE — PATIENT INSTRUCTIONS
Begin Entresto 24/26 twice a day  Decrease Lasix (furosemide) to 20mg once a day.   Take 1/2 of the 40mg tablets once a day  All other medications to remain the same  BMP to be prior to returning for follow-up, 2 weeks after being on the Entresto  Follow-up with Ajay Badillo in 3 weeks

## 2018-07-12 NOTE — MR AVS SNAPSHOT
[address] [place] [address] 
[phone] Patient: Katty Caro MRN: [id number] :1950 Visit Information Date & Time Provider Department Dept. Phone Encounter #  
 2018  2:30 PM Ban Rhodes NP [facility] [phone] [id number] Your Appointments 2018 11:00 AM  
New Patient with Dennis Martin MD  
[facility] ([facility]) Appt Note: np post menapause bleeding , please arrive 15 min early, bring list of meds, insurance info [address], [place] [address]  
  
   
 [address], [address] [facility] [address]  
  
    
 2018  2:30 PM  
Follow Up with Ban Rhodes NP [facility] ([facility]) Appt Note: 3 week f/u [facility] [place] [address]  
[phone] [address] [facility] [address] [address]  
  
    
 2018 11:20 AM  
POST HOSPITAL with Kayla Miles MD  
[facility] ([facility]) Appt Note: PH f/u; [facility] has been seeing for med titration [facility] [id number]  
[phone] [address] [facility] [address] Upcoming Health Maintenance Date Due  
 EYE EXAM RETINAL OR DILATED Q1 2017 Pneumococcal 65+ Low/Medium Risk (1 of 2 - PCV13) 2017 MICROALBUMIN Q1 2017 FOOT EXAM Q1 2018 FOBT Q 1 YEAR AGE 50-75 2018 MEDICARE YEARLY EXAM 3/20/2018 GLAUCOMA SCREENING Q2Y 2018 Influenza Age 5 to Adult 2018 HEMOGLOBIN A1C Q6M 12/10/2018 LIPID PANEL Q1 2019 BREAST CANCER SCRN MAMMOGRAM 2019 DTaP/Tdap/Td series (2 - Td) 2024 Allergies as of 7/12/2018  Review Complete On: 7/12/2018 By: Emilia Soto NP No Known Allergies Current Immunizations  Reviewed on 5/31/2018 Name Date Influenza Vaccine Hernandez Moore) 10/22/2015 Influenza Vaccine (Quad) PF 10/19/2016 Influenza Vaccine (Trivalent) 10/16/2014 Influenza Vaccine PF 1/8/2014 Pneumococcal Polysaccharide (PPSV-23) 8/27/2015  1:01 PM  
 Rabies Immune Globulin 6/26/2014  1:49 PM  
 Rabies Vaccine IM 7/10/2014 10:59 AM, 7/3/2014 10:55 AM, 6/29/2014  9:28 AM, 6/26/2014  1:46 PM  
 Tdap 6/26/2014  1:06 PM  
  
 Not reviewed this visit You Were Diagnosed With   
  
 Codes Comments Chronic systolic congestive heart failure (HCC)    -  Primary ICD-10-CM: R36.71 ICD-9-CM: 428.22, 428.0 Coronary artery disease involving native coronary artery of native heart without angina pectoris     ICD-10-CM: I25.10 ICD-9-CM: 414.01 Essential hypertension     ICD-10-CM: I10 
ICD-9-CM: 401.9 Cardiomyopathy, dilated ([facility]     ICD-10-CM: I42.0 ICD-9-CM: 425.4 Type 2 diabetes with nephropathy (HCC)     ICD-10-CM: E11.21 
ICD-9-CM: 250.40, 583.81 Vitals BP Pulse Height(growth percentile) Weight(growth percentile) SpO2 BMI  
 132/78 99 5' 4\" (1.626 m) 211 lb (95.7 kg) 98% 36.22 kg/m2 OB Status Smoking Status Menopause Former Smoker Vitals History BMI and BSA Data Body Mass Index Body Surface Area  
 36.22 kg/m 2 2.08 m 2 Preferred Pharmacy Pharmacy Name Phone [address], [address] [place] [address] [phone] Your Updated Medication List  
  
   
This list is accurate as of 7/12/18  2:53 PM.  Always use your most recent med list.  
  
  
  
  
 * albuterol 2.5 mg /3 mL (0.083 %) nebulizer solution Commonly known as:  PROVENTIL VENTOLIN  
3 mL by Nebulization route every four (4) hours as needed for Wheezing. * albuterol 90 mcg/actuation inhaler Commonly known as:  PROVENTIL HFA, VENTOLIN HFA, PROAIR HFA Take 2 Puffs by inhalation every four (4) hours as needed for Wheezing. ARAVA 20 mg tablet Generic drug:  leflunomide Take 20 mg by mouth daily. aspirin 81 mg chewable tablet Take 1 Tab by mouth daily. atorvastatin 20 mg tablet Commonly known as:  LIPITOR Take 1 Tab by mouth nightly. carvedilol 6.25 mg tablet Commonly known as:  Good Liter Take 1 Tab by mouth two (2) times daily (with meals). desloratadine 5 mg tablet Commonly known as:  CLARINEX Take 1 Tab by mouth daily as needed for Allergies. ferrous sulfate 325 mg (65 mg iron) tablet Take 1 Tab by mouth two (2) times a day. Indications: IRON DEFICIENCY ANEMIA  
  
 furosemide 40 mg tablet Commonly known as:  LASIX  
1 tab po daily  START DATE 6/14/2018  
  
 insulin detemir U-100 100 unit/mL injection Commonly known as:  LEVEMIR U-100 INSULIN  
15 units daily for diabetes  
  
 mometasone 50 mcg/actuation nasal spray Commonly known as:  NASONEX  
2 Sprays by Both Nostrils route daily as needed. For allergies  
  
 raNITIdine 150 mg tablet Commonly known as:  ZANTAC Take 1 Tab by mouth two (2) times a day. ticagrelor 90 mg tablet Commonly known as:  [place] Take 1 Tab by mouth two (2) times a day. umeclidinium-vilanterol 62.5-25 mcg/actuation inhaler Commonly known as:  Cordell Dingwall Take 1 Puff by inhalation daily. For COPD * Notice: This list has 2 medication(s) that are the same as other medications prescribed for you. Read the directions carefully, and ask your doctor or other care provider to review them with you. To-Do List   
 07/12/2018 Lab:  METABOLIC PANEL, BASIC Patient Instructions Begin Entresto 24/26 twice a day Decrease Lasix (furosemide) to 20mg once a day. Take 1/2 of the 40mg tablets once a day All other medications to remain the same BMP to be prior to returning for follow-up, 2 weeks after being on the [facility] Follow-up with [facility] in 3 weeks Introducing [facility]! [employer] introduces [employer] patient portal. Now you can access parts of your medical record, email your doctor's office, and request medication refills online. 1. In your internet browser, go to [email or web address] 2. Click on the First Time User? Click Here link in the Sign In box. You will see the New Member Sign Up page. 3. Enter your [employer] Access Code exactly as it appears below. You will not need to use this code after youve completed the sign-up process. If you do not sign up before the expiration date, you must request a new code. · [employer] Access Code: [email or web address] Expires: 8/18/2018  7:44 PM 
 
4. Enter the last four digits of your Social Security Number (xxxx) and Date of Birth (mm/dd/yyyy) as indicated and click Submit. You will be taken to the next sign-up page. 5. Create a ChipX ID. This will be your ChipX login ID and cannot be changed, so think of one that is secure and easy to remember. 6. Create a [employer] password. You can change your password at any time. 7. Enter your Password Reset Question and Answer. This can be used at a later time if you forget your password. 8. Enter your e-mail address. You will receive e-mail notification when new information is available in [address] 9. Click Sign Up. You can now view and download portions of your medical record. 10. Click the Download Summary menu link to download a portable copy of your medical information. If you have questions, please visit the Frequently Asked Questions section of the [employer] website. Remember, [employer] is NOT to be used for urgent needs. For medical emergencies, dial 911. Now available from your iPhone and Android! Please provide this summary of care documentation to your next provider. Your primary care clinician is listed as Kemal Marcelino. If you have any questions after today's visit, please call [phone].

## 2018-07-12 NOTE — PROGRESS NOTES
1. Have you been to the ER, urgent care clinic since your last visit? Hospitalized since your last visit? No    2. Have you seen or consulted any other health care providers outside of the [address] [place] [address] since your last visit? Include any pap smears or colon screening.  No    Patient is still having sob, dizziness and swelling    Patient has not started [facility], she states that she was told to do not start it until after her blood work

## 2018-07-20 ENCOUNTER — HOSPITAL ENCOUNTER (OUTPATIENT)
Dept: LAB | Age: 68
Discharge: HOME | End: 2018-07-20
Payer: MEDICARE

## 2018-07-20 ENCOUNTER — OFFICE VISIT (OUTPATIENT)
Dept: OBGYN CLINIC | Age: 68
End: 2018-07-20

## 2018-07-20 VITALS
SYSTOLIC BLOOD PRESSURE: 142 MMHG | DIASTOLIC BLOOD PRESSURE: 69 MMHG | BODY MASS INDEX: 36.29 KG/M2 | HEART RATE: 87 BPM | HEIGHT: 64 IN | OXYGEN SATURATION: 100 % | TEMPERATURE: 98 F | WEIGHT: 212.6 LBS

## 2018-07-20 DIAGNOSIS — E11.21 TYPE 2 DIABETES WITH NEPHROPATHY (HCC): ICD-10-CM

## 2018-07-20 DIAGNOSIS — E66.1 CLASS 2 DRUG-INDUCED OBESITY WITH SERIOUS COMORBIDITY AND BODY MASS INDEX (BMI) OF 36.0 TO 36.9 IN ADULT: ICD-10-CM

## 2018-07-20 DIAGNOSIS — I10 ESSENTIAL HYPERTENSION: ICD-10-CM

## 2018-07-20 DIAGNOSIS — Z01.419 ENCOUNTER FOR CERVICAL PAP SMEAR WITH PELVIC EXAM: ICD-10-CM

## 2018-07-20 DIAGNOSIS — N95.0 PMB (POSTMENOPAUSAL BLEEDING): Primary | ICD-10-CM

## 2018-07-20 DIAGNOSIS — I21.02 ST ELEVATION MYOCARDIAL INFARCTION INVOLVING LEFT ANTERIOR DESCENDING (LAD) CORONARY ARTERY (HCC): ICD-10-CM

## 2018-07-20 PROCEDURE — 88175 CYTOPATH C/V AUTO FLUID REDO: CPT | Performed by: OBSTETRICS & GYNECOLOGY

## 2018-07-20 PROCEDURE — 87624 HPV HI-RISK TYP POOLED RSLT: CPT | Performed by: OBSTETRICS & GYNECOLOGY

## 2018-07-20 PROCEDURE — 87491 CHLMYD TRACH DNA AMP PROBE: CPT | Performed by: OBSTETRICS & GYNECOLOGY

## 2018-07-20 RX ORDER — BACLOFEN 10 MG/1
TABLET ORAL 3 TIMES DAILY
COMMUNITY
End: 2019-07-19

## 2018-07-20 RX ORDER — GABAPENTIN 300 MG/1
CAPSULE ORAL
COMMUNITY
Start: 2018-04-27 | End: 2019-07-19

## 2018-07-20 NOTE — MR AVS SNAPSHOT
[address] [facility] 
 
 
 [address], [address] [facility] [address] 
[phone] Patient: Kelly Carver MRN: [id number] :1950 Visit Information Date & Time Provider Department Dept. Phone Encounter #  
 2018 11:00 AM Felisa Dumont MD [facility] [id number] Follow-up Instructions Return in about 4 weeks (around 2018) for follow up. Your Appointments 2018  2:30 PM  
Follow Up with Ana Pereira NP [facility] ([facility] Appt Note: 3 week f/u after starting April Candelaria [place] [phone]  
[phone] [facility] [address]  
  
    
 2018  2:00 PM  
Office Visit with Felisa Dumont MD  
[facility] ([facility]) Appt Note: 4 wk fu  
 [address], [facility] [address] [facility]  
  
   
 [address], [address] [facility] [address]  
  
    
 2018 11:20 AM  
POST HOSPITAL with Radha Adkins MD  
[facility] ([facility]) Appt Note: PH f/u; Eugenia Ramirez has been seeing for med titration [place] [id number]  
[phone] [address] Upcoming Health Maintenance Date Due FOBT Q 1 YEAR AGE 50-75 2018 Influenza Age 5 to Adult 2018 BREAST CANCER SCRN MAMMOGRAM 2019 Allergies as of 2018  Review Complete On: 2018 By: Felisa Dumont MD  
 No Known Allergies Current Immunizations  Reviewed on 2018 Name Date Influenza Vaccine Marisela Gonzalez) 10/22/2015 Influenza Vaccine (Quad) PF 10/19/2016 Influenza Vaccine (Trivalent) 10/16/2014 Influenza Vaccine PF 2014  Pneumococcal Polysaccharide (PPSV-23) 2015  1:01 PM  
 Rabies Immune Globulin 6/26/2014  1:49 PM  
 Rabies Vaccine IM 7/10/2014 10:59 AM, 7/3/2014 10:55 AM, 6/29/2014  9:28 AM, 6/26/2014  1:46 PM  
 Tdap 6/26/2014  1:06 PM  
  
 Not reviewed this visit You Were Diagnosed With   
  
 Codes Comments PMB (postmenopausal bleeding)    -  Primary ICD-10-CM: N95.0 ICD-9-CM: 627.1 Type 2 diabetes with nephropathy (HCC)     ICD-10-CM: E11.21 
ICD-9-CM: 250.40, 583.81 ST elevation myocardial infarction involving left anterior descending (LAD) coronary artery (HCC)     ICD-10-CM: I21.02 
ICD-9-CM: 410.10 Essential hypertension     ICD-10-CM: I10 
ICD-9-CM: 401.9 Encounter for cervical Pap smear with pelvic exam     ICD-10-CM: A71.074 ICD-9-CM: V76.2, V72.31 Class 2 drug-induced obesity with serious comorbidity and body mass index (BMI) of 36.0 to 36.9 in adult     ICD-10-CM: E66.1, Z68.36 
ICD-9-CM: 278.00, V85.36 Vitals BP Pulse Temp Height(growth percentile) Weight(growth percentile) SpO2  
 142/69 87 98 °F (36.7 °C) (Oral) 5' 4\" (1.626 m) 212 lb 9.6 oz (96.4 kg) 100% BMI OB Status Smoking Status 36.49 kg/m2 Menopause Former Smoker BMI and BSA Data Body Mass Index Body Surface Area  
 36.49 kg/m 2 2.09 m 2 Preferred Pharmacy Pharmacy Name Phone [address] [place] [address] [phone] Your Updated Medication List  
  
   
This list is accurate as of 7/20/18 12:40 PM.  Always use your most recent med list.  
  
  
  
  
 * albuterol 2.5 mg /3 mL (0.083 %) nebulizer solution Commonly known as:  PROVENTIL VENTOLIN  
3 mL by Nebulization route every four (4) hours as needed for Wheezing. * albuterol 90 mcg/actuation inhaler Commonly known as:  PROVENTIL HFA, VENTOLIN HFA, PROAIR HFA Take 2 Puffs by inhalation every four (4) hours as needed for Wheezing. ARAVA 20 mg tablet Generic drug:  leflunomide Take 20 mg by mouth daily. aspirin 81 mg chewable tablet Take 1 Tab by mouth daily. atorvastatin 20 mg tablet Commonly known as:  LIPITOR Take 1 Tab by mouth nightly. baclofen 10 mg tablet Commonly known as:  LIORESAL Take  by mouth three (3) times daily. carvedilol 6.25 mg tablet Commonly known as:  Héctor Hi Take 1 Tab by mouth two (2) times daily (with meals). desloratadine 5 mg tablet Commonly known as:  CLARINEX Take 1 Tab by mouth daily as needed for Allergies. ferrous sulfate 325 mg (65 mg iron) tablet Take 1 Tab by mouth two (2) times a day. Indications: IRON DEFICIENCY ANEMIA  
  
 furosemide 40 mg tablet Commonly known as:  LASIX  
1 tab po daily  START DATE 6/14/2018  
  
 gabapentin 300 mg capsule Commonly known as:  NEURONTIN  
  
 insulin detemir U-100 100 unit/mL injection Commonly known as:  LEVEMIR U-100 INSULIN  
15 units daily for diabetes  
  
 mometasone 50 mcg/actuation nasal spray Commonly known as:  NASONEX  
2 Sprays by Both Nostrils route daily as needed. For allergies  
  
 raNITIdine 150 mg tablet Commonly known as:  ZANTAC Take 1 Tab by mouth two (2) times a day. sacubitril-valsartan 24-26 mg tablet Commonly known as:  ENTRESTO Take 1 Tab by mouth two (2) times a day. ticagrelor 90 mg tablet Commonly known as:  [employer] Take 1 Tab by mouth two (2) times a day. umeclidinium-vilanterol 62.5-25 mcg/actuation inhaler Commonly known as:  Meagan Pander Take 1 Puff by inhalation daily. For COPD * Notice: This list has 2 medication(s) that are the same as other medications prescribed for you. Read the directions carefully, and ask your doctor or other care provider to review them with you. Follow-up Instructions Return in about 4 weeks (around 8/17/2018) for follow up. To-Do List   
 07/20/2018 Imaging:  US PELV NON OB W TV   
  
  
Patient Instructions Vaginal Bleeding After Menopause: Care Instructions Your Care Instructions Vaginal bleeding after menopause can have many causes. Causes may include infection, inflammation, prescription hormones, abnormal growths, and injury. Your doctor may want you to have more tests to find the cause of your vaginal bleeding. Follow-up care is a key part of your treatment and safety. Be sure to make and go to all appointments, and call your doctor if you are having problems. It's also a good idea to know your test results and keep a list of the medicines you take. How can you care for yourself at home? · If your doctor gave you medicine, take it exactly as prescribed. Call your doctor if you think you are having a problem with your medicine. · Do not have sex or put anything inside your vagina until you talk with your doctor. · Do not douche. When should you call for help? Call 911 anytime you think you may need emergency care. For example, call if: 
  · You passed out (lost consciousness).  
 Call your doctor now or seek immediate medical care if: 
  · You have severe vaginal bleeding.  
  · You are dizzy or lightheaded, or you feel like you may faint.  
  · You have new or worse belly or pelvic pain.  
 Watch closely for changes in your health, and be sure to contact your doctor if: 
  · Your bleeding gets worse.  
  · You think you might be pregnant.  
  · You do not get better as expected. Where can you learn more? Go to http://jason-charity.info/. Enter N304 in the search box to learn more about \"Vaginal Bleeding After Menopause: Care Instructions. \" Current as of: October 6, 2017 Content Version: 11.7 © 5193-3278 [employer]. Care instructions adapted under license by [employer] (which disclaims liability or warranty for this information).  If you have questions about a medical condition or this instruction, always ask your healthcare professional. [employer] disclaims any warranty or liability for your use of this information. Introducing [facility]! [employer] introduces [employer] patient portal. Now you can access parts of your medical record, email your doctor's office, and request medication refills online. 1. In your internet browser, go to [email or web address] 2. Click on the First Time User? Click Here link in the Sign In box. You will see the New Member Sign Up page. 3. Enter your [employer] Access Code exactly as it appears below. You will not need to use this code after youve completed the sign-up process. If you do not sign up before the expiration date, you must request a new code. · [employer] Access Code: [email or web address] Expires: 8/18/2018  7:44 PM 
 
4. Enter the last four digits of your Social Security Number (xxxx) and Date of Birth (mm/dd/yyyy) as indicated and click Submit. You will be taken to the next sign-up page. 5. Create a [employer] ID. This will be your [employer] login ID and cannot be changed, so think of one that is secure and easy to remember. 6. Create a [employer] password. You can change your password at any time. 7. Enter your Password Reset Question and Answer. This can be used at a later time if you forget your password. 8. Enter your e-mail address. You will receive e-mail notification when new information is available in [address] 9. Click Sign Up. You can now view and download portions of your medical record. 10. Click the Download Summary menu link to download a portable copy of your medical information. If you have questions, please visit the Frequently Asked Questions section of the [employer] website. Remember, [employer] is NOT to be used for urgent needs. For medical emergencies, dial 911. Now available from your iPhone and Android! Please provide this summary of care documentation to your next provider. Your primary care clinician is listed as Dane Herrera. If you have any questions after today's visit, please call [phone].

## 2018-07-20 NOTE — PROGRESS NOTES
Name: Jose Block MRN: [id number]    YOB: 1950  Age: 79 y.o. Sex: female        Chief Complaint   Patient presents with    Vaginal Bleeding       HPI     1. Vaginal bleeding  Notes that she had a HA , had heart catherization, start on Brilinta as a blood thinner on , was doing well until she was discharged. Started having VB in the hospital, was mild, seemed to increase once she was D/C'ed, went to [facility], was given medroxyprogesterone x 10 days, bleeding stopped by the 10th day, has not had it again, no bleeding at this time, was asked to follow up with gyn, has had no bleeding for the past month or so    2. Heart Attack  , 3 stents placed, on blood thinners    OB History      Para Term  AB Living    3 3 3   3    SAB TAB Ectopic Molar Multiple Live Births         3        Obstetric Comments      Patient is not currently having periods (Reason: Menopause). History of sexually transmitted infections gonorrhea  Last pap 10/2015 neg             PGyn  History   Sexual Activity    Sexual activity: Yes    Partners: Male         Past Medical History:   Diagnosis Date    Abnormal WBC count 2016    Bilateral shoulder pain 2016    Chondromalacia of both patellae     COPD [facility]chronic obstructive pulmonary disease) [facility] 2015    mild-mod dz; Dr Reuben Hernandez    Diabetes [facility])     Diabetic eye exam ([facility]     Dilated cardiomyopathy ([facility]     Dyslipidemia     Heart attack ([facility]     History of echocardiogram 2014    Mild LVE. EF 40%. Mild, diffuse hypk. Mild LAE.   Mild MR.      Hypercholesteremia 14    Hypertension 2000    Noncompliance with medications 2016    Obesity     Orthostatic hypotension 2016    Osteoarthritis of both knees     Pain management 2016    Dr. Luke Lzoano Popliteal cyst, bilateral      Vitamin D deficiency 10/16/14       Past Surgical History:   Procedure Laterality Date    HX HEART CATHETERIZATION      HX TUBAL LIGATION         No Known Allergies    Current Outpatient Prescriptions on File Prior to Visit   Medication Sig Dispense Refill    sacubitril-valsartan (ENTRESTO) 24 mg/26 mg tablet Take 1 Tab by mouth two (2) times a day. 60 Tab 6    raNITIdine (ZANTAC) 150 mg tablet Take 1 Tab by mouth two (2) times a day. 60 Tab 4    carvedilol (COREG) 6.25 mg tablet Take 1 Tab by mouth two (2) times daily (with meals). 60 Tab 6    leflunomide (ARAVA) 20 mg tablet Take 20 mg by mouth daily.  furosemide (LASIX) 40 mg tablet 1 tab po daily  START DATE 6/14/2018 30 Tab 6    ticagrelor (BRILINTA) 90 mg tablet Take 1 Tab by mouth two (2) times a day. 60 Tab 11    atorvastatin (LIPITOR) 20 mg tablet Take 1 Tab by mouth nightly. 30 Tab 0    aspirin 81 mg chewable tablet Take 1 Tab by mouth daily. 30 Tab 0    insulin detemir (LEVEMIR) 100 unit/mL injection 15 units daily for diabetes 2 Vial 0    albuterol (PROVENTIL HFA, VENTOLIN HFA, PROAIR HFA) 90 mcg/actuation inhaler Take 2 Puffs by inhalation every four (4) hours as needed for Wheezing. 1 Inhaler 3    desloratadine (CLARINEX) 5 mg tablet Take 1 Tab by mouth daily as needed for Allergies. 30 Tab 0    mometasone (NASONEX) 50 mcg/actuation nasal spray 2 Sprays by Both Nostrils route daily as needed. For allergies 3 Container 3    umeclidinium-vilanterol (ANORO ELLIPTA) 62.5-25 mcg/actuation inhaler Take 1 Puff by inhalation daily. For COPD 3 Inhaler 3    albuterol (PROVENTIL VENTOLIN) 2.5 mg /3 mL (0.083 %) nebulizer solution 3 mL by Nebulization route every four (4) hours as needed for Wheezing. 60 Each 3    ferrous sulfate 325 mg (65 mg iron) tablet Take 1 Tab by mouth two (2) times a day. Indications: IRON DEFICIENCY ANEMIA 60 Tab 11     No current facility-administered medications on file prior to visit.         Social History     Social History    Marital status: LEGALLY      Spouse name: N/A    Number of children: 3    Years of education: N/A     Occupational History    retired      Social History Main Topics    Smoking status: Former Smoker     Packs/day: 0.25     Years: 48.00     Types: Cigarettes     Quit date: 5/20/2018    Smokeless tobacco: Never Used    Alcohol use No    Drug use: No    Sexual activity: Yes     Partners: Male     Other Topics Concern     Service No    Blood Transfusions No    Caffeine Concern No    Occupational Exposure No    Hobby Hazards No    Sleep Concern No    Stress Concern No    Weight Concern No    Special Diet No    Back Care No    Exercise No    Bike Helmet No    Seat Belt Yes    Self-Exams Yes     Social History Narrative       Family History   Problem Relation Age of Onset    Diabetes Mother     Hypertension Mother     Hypertension Father     Kidney Disease Maternal Aunt 50     Dialysis       Review of Systems   Constitutional: Negative. Negative for chills and fever. HENT: Negative. Negative for congestion and sore throat. Respiratory: Negative. Negative for cough and shortness of breath. Cardiovascular: Negative. Negative for chest pain. Gastrointestinal: Negative. Negative for abdominal pain, constipation, diarrhea, nausea and vomiting. Genitourinary: Negative. Negative for dysuria, frequency and urgency. Musculoskeletal: Negative. Negative for back pain and joint pain. Skin: Negative. Negative for itching and rash. Neurological: Negative. Negative for dizziness and headaches. Psychiatric/Behavioral: Negative. Negative for depression and suicidal ideas. All other systems reviewed and are negative.           Visit Vitals    /69    Pulse 87    Temp 98 °F (36.7 °C) (Oral)    Ht 5' 4\" (1.626 m)    Wt 212 lb 9.6 oz (96.4 kg)    SpO2 100%    BMI 36.49 kg/m2       GENERAL:  Well developed, well nourished, in no distress  NEURO/PSYCHE: Grossly intact, normal mood and affect  HEENT: Normal cephalic, atraumatic, good dentition, neck supple. No thyromegaly  CV: regular rate and rhythm  LUNGS: clear to auscultation bilaterally, no wheezes, rhonchi or rales, good air entry with normal effort  ABDOMEN: + BS, soft without tenderness, no guarding, rebound or masses  EXTREMITIES: no edema or erythema noted  SKIN:  Warm, dry, no lesions  LYMPHATICS: No supraclavicular or inguinal nodes noted    PELVIC EXAM:  LABIA MAJORA: no masses, tenderness or lesions  LABIA MINORA: no masses, tenderness or lesions  CLITORIS: no masses, tenderness or lesions  URETHRA: normal appearing, no masses or tenderness  BLADDER: no fullness or tenderness  VAGINA: pink appearing vagina with physiologic discharge, no lesions   PERINEUM: no masses, tenderness or lesions  CERVIX: No CMT or lesions  UTERUS: small, mobile, mild tenderness with palpation of uterus  ADNEXA: nontender and no masses    No results found for this or any previous visit (from the past 24 hour(s)). ICD-10-CM ICD-9-CM   1. PMB (postmenopausal bleeding) N95.0 627.1   2. Type 2 diabetes with nephropathy (HCC) E11.21 250.40     583.81   3. ST elevation myocardial infarction involving left anterior descending (LAD) coronary artery (HCC) I21.02 410.10   4. Essential hypertension I10 401.9   5. Encounter for cervical Pap smear with pelvic exam Z01.419 V76.2     V72.31   6. Class 2 drug-induced obesity with serious comorbidity and body mass index (BMI) of 36.0 to 36.9 in adult E66.1 278.00    Z68.36 V85.36       1. PMB (postmenopausal bleeding)  Reviewed the patient that likely her postmenopausal bleeding was called by her blood thinner. Discussed other possibilities like atrophy, fibroids, polyp or even hyperplasia or uterine cancer. She has not had any other bleeding since being on the progesterone therapy. Will order an ultrasound. If her stripe is thin will continue with expectant management, if not we will plan for tissue biopsy.     - US PELV NON OB W TV; Future  - PAP IG, CT-NG-TV, APTIMA HPV AND [address] 19/89,48([id number]); Future    2. Type 2 diabetes with nephropathy ([facility] [address]      3. ST elevation myocardial infarction involving left anterior descending (LAD) coronary artery (HCC)  Status post stent placement for her myocardial infarction, on chronic anticoagulation. 4. Essential hypertension  Blood pressure mildly elevated today, on her medications    5. Encounter for cervical Pap smear with pelvic exam  Last Pap 2015 will re-Pap today with STD testing  - PAP IG, CT-NG-TV, APTIMA HPV AND RFX 64/50,77([id number]); Future    6.   Obesity BMI 36  Risk factor for hyperplasia and carcinoma    F/U 4 weeks

## 2018-07-20 NOTE — PATIENT INSTRUCTIONS
Vaginal Bleeding After Menopause: Care Instructions  Your Care Instructions    Vaginal bleeding after menopause can have many causes. Causes may include infection, inflammation, prescription hormones, abnormal growths, and injury. Your doctor may want you to have more tests to find the cause of your vaginal bleeding. Follow-up care is a key part of your treatment and safety. Be sure to make and go to all appointments, and call your doctor if you are having problems. It's also a good idea to know your test results and keep a list of the medicines you take. How can you care for yourself at home? · If your doctor gave you medicine, take it exactly as prescribed. Call your doctor if you think you are having a problem with your medicine. · Do not have sex or put anything inside your vagina until you talk with your doctor. · Do not douche. When should you call for help? Call 911 anytime you think you may need emergency care. For example, call if:    · You passed out (lost consciousness).    Call your doctor now or seek immediate medical care if:    · You have severe vaginal bleeding.     · You are dizzy or lightheaded, or you feel like you may faint.     · You have new or worse belly or pelvic pain.    Watch closely for changes in your health, and be sure to contact your doctor if:    · Your bleeding gets worse.     · You think you might be pregnant.     · You do not get better as expected. Where can you learn more? Go to http://jason-charity.info/. Enter N304 in the search box to learn more about \"Vaginal Bleeding After Menopause: Care Instructions. \"  Current as of: October 6, 2017  Content Version: 11.7  © 5893-9754 [employer]. Care instructions adapted under license by [employer] (which disclaims liability or warranty for this information).  If you have questions about a medical condition or this instruction, always ask your healthcare professional. [employer] disclaims any warranty or liability for your use of this information.

## 2018-07-23 LAB
C TRACH RRNA SPEC QL NAA+PROBE: NEGATIVE
N GONORRHOEA RRNA SPEC QL NAA+PROBE: NEGATIVE
SPECIMEN SOURCE: NORMAL
T VAGINALIS RRNA SPEC QL NAA+PROBE: NEGATIVE

## 2018-07-25 ENCOUNTER — HOSPITAL ENCOUNTER (OUTPATIENT)
Dept: ULTRASOUND IMAGING | Age: 68
Discharge: HOME | End: 2018-07-25
Attending: OBSTETRICS & GYNECOLOGY
Payer: MEDICARE

## 2018-07-25 DIAGNOSIS — N95.0 PMB (POSTMENOPAUSAL BLEEDING): ICD-10-CM

## 2018-07-25 PROCEDURE — 76830 TRANSVAGINAL US NON-OB: CPT

## 2018-07-26 ENCOUNTER — HOSPITAL ENCOUNTER (OUTPATIENT)
Dept: LAB | Age: 68
Discharge: HOME | End: 2018-07-26
Payer: MEDICARE

## 2018-07-26 DIAGNOSIS — I50.22 CHRONIC SYSTOLIC CONGESTIVE HEART FAILURE (HCC): ICD-10-CM

## 2018-07-26 DIAGNOSIS — I25.10 CORONARY ARTERY DISEASE INVOLVING NATIVE CORONARY ARTERY OF NATIVE HEART WITHOUT ANGINA PECTORIS: ICD-10-CM

## 2018-07-26 LAB
ANION GAP SERPL CALC-SCNC: 10 MMOL/L (ref 3–18)
BUN SERPL-MCNC: 27 MG/DL (ref 7–18)
BUN/CREAT SERPL: 19 (ref 12–20)
CALCIUM SERPL-MCNC: 8.5 MG/DL (ref 8.5–10.1)
CHLORIDE SERPL-SCNC: 110 MMOL/L (ref 100–108)
CO2 SERPL-SCNC: 23 MMOL/L (ref 21–32)
CREAT SERPL-MCNC: 1.43 MG/DL (ref 0.6–1.3)
GLUCOSE SERPL-MCNC: 136 MG/DL (ref 74–99)
POTASSIUM SERPL-SCNC: 4.7 MMOL/L (ref 3.5–5.5)
SODIUM SERPL-SCNC: 143 MMOL/L (ref 136–145)

## 2018-07-26 PROCEDURE — 36415 COLL VENOUS BLD VENIPUNCTURE: CPT | Performed by: NURSE PRACTITIONER

## 2018-07-26 PROCEDURE — 80048 BASIC METABOLIC PNL TOTAL CA: CPT | Performed by: NURSE PRACTITIONER

## 2018-08-01 ENCOUNTER — DOCUMENTATION ONLY (OUTPATIENT)
Dept: OBGYN CLINIC | Age: 68
End: 2018-08-01

## 2018-08-01 NOTE — PROGRESS NOTES
Records received from [facility]  Referral noted for postmenopausal bleeding

## 2018-08-02 ENCOUNTER — OFFICE VISIT (OUTPATIENT)
Dept: CARDIOLOGY CLINIC | Age: 68
End: 2018-08-02

## 2018-08-02 VITALS
HEIGHT: 64 IN | HEART RATE: 81 BPM | BODY MASS INDEX: 36.37 KG/M2 | DIASTOLIC BLOOD PRESSURE: 64 MMHG | SYSTOLIC BLOOD PRESSURE: 128 MMHG | WEIGHT: 213 LBS | OXYGEN SATURATION: 96 %

## 2018-08-02 DIAGNOSIS — R06.02 SOB (SHORTNESS OF BREATH): ICD-10-CM

## 2018-08-02 DIAGNOSIS — I10 ESSENTIAL HYPERTENSION: ICD-10-CM

## 2018-08-02 DIAGNOSIS — I25.10 CORONARY ARTERY DISEASE INVOLVING NATIVE CORONARY ARTERY OF NATIVE HEART WITHOUT ANGINA PECTORIS: Primary | ICD-10-CM

## 2018-08-02 DIAGNOSIS — I42.0 CARDIOMYOPATHY, DILATED (HCC): ICD-10-CM

## 2018-08-02 RX ORDER — FUROSEMIDE 40 MG/1
20 TABLET ORAL DAILY
Qty: 1 TAB | Refills: 0
Start: 2018-08-02 | End: 2019-08-28

## 2018-08-02 NOTE — MR AVS SNAPSHOT
[address] [facility] [address] [address] [address] [id number] [phone] Patient: Mercedez Leung MRN: [id number] :1950 Visit Information Date & Time Provider Department Dept. Phone Encounter #  
 2018  2:30 PM Geno Lawson NP [facility] [address] [id number] Your Appointments 2018  2:30 PM  
Follow Up with Geno Lawson NP [facility] ([address] [place] [address]) Appt Note: 3 week f/u after starting Yessica Dancer [place] [address] [address] [phone] [address]  
  
    
 2018  2:00 PM  
Office Visit with Tiffany Luo MD  
[facility] ([address] [place] [address]) Appt Note: 4 wk fu  
 [address], [place] [facility]  
  
   
 [address], [address] [address] [facility] [address]  
  
    
 2018 11:20 AM  
POST HOSPITAL with Katiana Cano MD  
[facility] ([address] [place] [address]) Appt Note: PH f/u; [place] has been seeing for med titration [place] [address] [address] [phone]  
[phone] 2300 [facility] [address] Upcoming Health Maintenance Date Due  
 EYE EXAM RETINAL OR DILATED Q1 2017 Pneumococcal 65+ Low/Medium Risk (1 of 2 - PCV13) 2017 MICROALBUMIN Q1 2017 FOOT EXAM Q1 2018 FOBT Q 1 YEAR AGE 50-75 2018 MEDICARE YEARLY EXAM 3/20/2018 GLAUCOMA SCREENING Q2Y 2018 Influenza Age 5 to Adult 2018 HEMOGLOBIN A1C Q6M 12/10/2018 LIPID PANEL Q1 2019 BREAST CANCER SCRN MAMMOGRAM 2019 DTaP/Tdap/Td series (2 - Td) 2024 Allergies as of 2018  Review Complete On: 2018 By: Geno Lawson NP No Known Allergies Current Immunizations  Reviewed on 5/31/2018 Name Date Influenza Vaccine Kendrick Oro) 10/22/2015 Influenza Vaccine (Quad) PF 10/19/2016 Influenza Vaccine (Trivalent) 10/16/2014 Influenza Vaccine PF 1/8/2014 Pneumococcal Polysaccharide (PPSV-23) 8/27/2015  1:01 PM  
 Rabies Immune Globulin 6/26/2014  1:49 PM  
 Rabies Vaccine IM 7/10/2014 10:59 AM, 7/3/2014 10:55 AM, 6/29/2014  9:28 AM, 6/26/2014  1:46 PM  
 Tdap 6/26/2014  1:06 PM  
  
 Not reviewed this visit You Were Diagnosed With   
  
 Codes Comments Coronary artery disease involving native coronary artery of native heart without angina pectoris    -  Primary ICD-10-CM: I25.10 ICD-9-CM: 414.01 Essential hypertension     ICD-10-CM: I10 
ICD-9-CM: 401.9 Cardiomyopathy, dilated ([facility] [address]     ICD-10-CM: I42.0 ICD-9-CM: 425.4 SOB (shortness of breath)     ICD-10-CM: R06.02 
ICD-9-CM: 786.05 Vitals BP Pulse Height(growth percentile) Weight(growth percentile) SpO2 BMI  
 128/64 81 5' 4\" (1.626 m) 213 lb (96.6 kg) 96% 36.56 kg/m2 OB Status Smoking Status Menopause Former Smoker BMI and BSA Data Body Mass Index Body Surface Area  
 36.56 kg/m 2 2.09 m 2 Preferred Pharmacy Pharmacy Name Phone [address] [place] [address], [address] [place] [address] [phone] Your Updated Medication List  
  
   
This list is accurate as of 8/2/18  2:02 PM.  Always use your most recent med list.  
  
  
  
  
 * albuterol 2.5 mg /3 mL (0.083 %) nebulizer solution Commonly known as:  PROVENTIL VENTOLIN  
3 mL by Nebulization route every four (4) hours as needed for Wheezing. * albuterol 90 mcg/actuation inhaler Commonly known as:  PROVENTIL HFA, VENTOLIN HFA, PROAIR HFA Take 2 Puffs by inhalation every four (4) hours as needed for Wheezing. ARAVA 20 mg tablet Generic drug:  leflunomide Take 20 mg by mouth daily. aspirin 81 mg chewable tablet Take 1 Tab by mouth daily. atorvastatin 20 mg tablet Commonly known as:  LIPITOR Take 1 Tab by mouth nightly. baclofen 10 mg tablet Commonly known as:  LIORESAL Take  by mouth three (3) times daily. carvedilol 6.25 mg tablet Commonly known as:  Aime Peat Take 1 Tab by mouth two (2) times daily (with meals). desloratadine 5 mg tablet Commonly known as:  CLARINEX Take 1 Tab by mouth daily as needed for Allergies. ferrous sulfate 325 mg (65 mg iron) tablet Take 1 Tab by mouth two (2) times a day. Indications: IRON DEFICIENCY ANEMIA  
  
 furosemide 40 mg tablet Commonly known as:  LASIX Take 0.5 Tabs by mouth daily. gabapentin 300 mg capsule Commonly known as:  NEURONTIN  
  
 insulin detemir U-100 100 unit/mL injection Commonly known as:  LEVEMIR U-100 INSULIN  
15 units daily for diabetes  
  
 mometasone 50 mcg/actuation nasal spray Commonly known as:  NASONEX  
2 Sprays by Both Nostrils route daily as needed. For allergies  
  
 raNITIdine 150 mg tablet Commonly known as:  ZANTAC Take 1 Tab by mouth two (2) times a day. sacubitril-valsartan 24-26 mg tablet Commonly known as:  ENTRESTO Take 1 Tab by mouth two (2) times a day. ticagrelor 90 mg tablet Commonly known as:  [employer] Take 1 Tab by mouth two (2) times a day. umeclidinium-vilanterol 62.5-25 mcg/actuation inhaler Commonly known as:  Alexis Burks Take 1 Puff by inhalation daily. For COPD * Notice: This list has 2 medication(s) that are the same as other medications prescribed for you. Read the directions carefully, and ask your doctor or other care provider to review them with you. Patient Instructions Take Lasix 40mg daily for 3 days and then back to 20mg daily Increase Entresto 49/51 twice a day If shortness of breath does not improve after taking the increased dose of Lasix, call the office and we will consider switching from Brilinta to Plavix Follow-up with Dr. Ulysses Hood as scheduled and as needed Weigh daily and record Limit sodium intake to 2000mg per day Limit fluid intake to no more than  6, eight ounce glasses of any type of fluids per day (total of 48 ounces per day) Call if you notice sudden or progressive weight gain (3-5 pounds in 2-3 days), increasing shortness of breath, abdominal bloating, increasing lower extremity edema, inability to lie flat or on your normal number of pillows, having to sit up to catch your breath, fatigue, increased somnolence (sleeping more), poor appetite Introducing [facility]! [facility] introduces adFreeq patient portal. Now you can access parts of your medical record, email your doctor's office, and request medication refills online. 1. In your internet browser, go to [email or web address] 2. Click on the First Time User? Click Here link in the Sign In box. You will see the New Member Sign Up page. 3. Enter your adFreeq Access Code exactly as it appears below. You will not need to use this code after youve completed the sign-up process. If you do not sign up before the expiration date, you must request a new code. · adFreeq Access Code: [email or web address] Expires: 8/18/2018  7:44 PM 
 
4. Enter the last four digits of your Social Security Number (xxxx) and Date of Birth (mm/dd/yyyy) as indicated and click Submit. You will be taken to the next sign-up page. 5. Create a [employer] ID. This will be your adFreeq login ID and cannot be changed, so think of one that is secure and easy to remember. 6. Create a [employer]q password. You can change your password at any time. 7. Enter your Password Reset Question and Answer. This can be used at a later time if you forget your password. 8. Enter your e-mail address. You will receive e-mail notification when new information is available in [address] 9. Click Sign Up.  You can now view and download portions of your medical record. 10. Click the Download Summary menu link to download a portable copy of your medical information. If you have questions, please visit the Frequently Asked Questions section of the [employer] website. Remember, [employer] is NOT to be used for urgent needs. For medical emergencies, dial 911. Now available from your iPhone and Android! Please provide this summary of care documentation to your next provider. Your primary care clinician is listed as [place]as. If you have any questions after today's visit, please call [phone].

## 2018-08-02 NOTE — PROGRESS NOTES
Katty Caro presents today for follow-up after being started on Entresto 24/26 BID. During her last visit, she reported that she did not start the medication as she states that the pharmacy did not have the medication. When I saw her on 7/12/18, she was instructed to begin taking the medication. Her lasix was decreased to 20mg daily when she began taking the [facility]. She is a 74year old female with history of hyperlipidemia, diabetes, COPD, ischemic cardiomyopathy, morbid obesity, tobacco use, and CAD (s/p STEMI code/PEA/respiratory failure for which she underwent YADIRA to proximal LAD with Resolute stent on 5/20/18. She developed acute stent restenosis shortly after the initial PCI which required emergent intubation for pulmonary edema and she underwent a second catheterization for PCI/YADIRA to the proximal LAD. An echo done during her hospitalization showed an EF of 35%, severe hypokinesis of the entire anterior, basal-mid anteroseptal, apical septal, apical lateral and apical walls, and RVSP of 44 mmHg, and mild MR. The cardiac catheterization done (for acute anterolateral MI) on 5/20/18 showed the following:   Coronary angiography:  Dominance: Right  LM: Normal  LAD: High proximal 90-95% tubular stenosis, D1: ostial 50%, small to medium vessel  LCX: / OM: Normal  RCA: Dominant, mid and distal 20-30% otherwise normal  S/P PCI and stenting of proximal LAD 90-95% stenosis using 3.5 X 18 mm XIENCE YADIRA    The cardiac catheterization done (for acute pulmonary edema and ST elevations on EKG) on 5/21/18 showed the following:   Proximal edge stent thrombosis noted. Concern was possible dissection which may have caused by proximal stent   The  lesion in the LAD coronary artery was crossed with a 0.014\" Pro-water guide wire without difficulty. BMW wire placed in LCx   Pre-dilation was performed using  3.0 X 12 mm EMERGE balloon.    * PCI and stenting of Ostial LAD using 3.0 X 8 mm RESOLUTE LAINA stent ( overlapping with proximal stent)   * PCI and stenting of mid LAD using 2.75 X 8 mm RESOLUTE LAINA stent ( overlapping with proximal stent)   Post-dilation of ostial LAD stent and distal stent portion of stents were done by 3.25 X 12 mm NC and mid stent was post-dilated with 3.5 X 12 mm NC ballon    Intracoronary NTG was used as needed during the procedure. ISRRAEL 0 flow before and ISRRAEL 3 flow after procedure   Stenosis reduction from 100% to 0%    She was hospitalized from 6/8/18 thrugh 6/11/18 for acute on chronic diastolic heart failure. She presented with complaints of left chest burning sensation and respiratory distress/pulmonary edema. Her blood pressure was elevated. Her troponin was 0.15 and her NT pro-BNP was 11,717. She states that she feels [place]. \"  She states that her shortness of breath has increased somewhat. Her weight is up 2 pounds. Denies chest pain, tightness, heaviness, and palpitations. She admits to some mild, chronic shortness of breath with exertion, has 2 pillow orthopnea and denies PND. Denies abdominal bloating. Denies lightheadedness, dizziness, and syncope. Denies lower extremity edema and claudication. Denies nausea, vomiting, diarrhea, melena, hematochezia. Denies hematuria, urgency, frequency. Denies fever, chills. She states that she is taking her medications as prescribed. PMH:  Past Medical History:   Diagnosis Date    Abnormal WBC count 5/17/2016    Bilateral shoulder pain 9/27/2016    Chondromalacia of both patellae     COPD (chronic obstructive pulmonary disease) ([facility] [address] 9/2015    mild-mod dz; Dr Jason Russian    Diabetes [facility]) 2013    Diabetic eye exam ([facility] [address] 2016    Dilated cardiomyopathy ([facility] [address]     Dyslipidemia     Heart attack ([facility] [address]     History of echocardiogram 11/14/2014    Mild LVE. EF 40%. Mild, diffuse hypk. Mild LAE.   Mild MR.      Hypercholesteremia 1/08/14    Hypertension 2000    Noncompliance with medications 2/16/2016    Obesity     Orthostatic hypotension 5/17/2016    Osteoarthritis of both knees     Pain management 04/01/2016    Dr. Abner Krishna Popliteal cyst, bilateral      Vitamin D deficiency 10/16/14       PSH:  Past Surgical History:   Procedure Laterality Date    HX HEART CATHETERIZATION      HX TUBAL LIGATION         MEDS:  Current Outpatient Prescriptions   Medication Sig    furosemide (LASIX) 40 mg tablet Take 0.5 Tabs by mouth daily.  baclofen (LIORESAL) 10 mg tablet Take  by mouth three (3) times daily.  gabapentin (NEURONTIN) 300 mg capsule     sacubitril-valsartan (ENTRESTO) 24 mg/26 mg tablet Take 1 Tab by mouth two (2) times a day.  raNITIdine (ZANTAC) 150 mg tablet Take 1 Tab by mouth two (2) times a day.  carvedilol (COREG) 6.25 mg tablet Take 1 Tab by mouth two (2) times daily (with meals).  leflunomide (ARAVA) 20 mg tablet Take 20 mg by mouth daily.  ticagrelor (BRILINTA) 90 mg tablet Take 1 Tab by mouth two (2) times a day.  atorvastatin (LIPITOR) 20 mg tablet Take 1 Tab by mouth nightly.  aspirin 81 mg chewable tablet Take 1 Tab by mouth daily.  insulin detemir (LEVEMIR) 100 unit/mL injection 15 units daily for diabetes    albuterol (PROVENTIL HFA, VENTOLIN HFA, PROAIR HFA) 90 mcg/actuation inhaler Take 2 Puffs by inhalation every four (4) hours as needed for Wheezing.  desloratadine (CLARINEX) 5 mg tablet Take 1 Tab by mouth daily as needed for Allergies.  mometasone (NASONEX) 50 mcg/actuation nasal spray 2 Sprays by Both Nostrils route daily as needed. For allergies    umeclidinium-vilanterol (ANORO ELLIPTA) 62.5-25 mcg/actuation inhaler Take 1 Puff by inhalation daily. For COPD    albuterol (PROVENTIL VENTOLIN) 2.5 mg /3 mL (0.083 %) nebulizer solution 3 mL by Nebulization route every four (4) hours as needed for Wheezing.  ferrous sulfate 325 mg (65 mg iron) tablet Take 1 Tab by mouth two (2) times a day.  Indications: IRON DEFICIENCY ANEMIA     No current facility-administered medications for this visit. Allergies and Sensitivities:  No Known Allergies    Family History:  Family History   Problem Relation Age of Onset    Diabetes Mother     Hypertension Mother     Hypertension Father     Kidney Disease Maternal Aunt 48     Dialysis       Social History:  She  reports that she quit smoking about 2 months ago. Her smoking use included Cigarettes. She has a 12.00 pack-year smoking history. She has never used smokeless tobacco.  She  reports that she does not drink alcohol. Physical:  Visit Vitals    /64    Pulse 81    Ht 5' 4\" (1.626 m)    Wt 96.6 kg (213 lb)    SpO2 96%    BMI 36.56 kg/m2       Her weight is up 2 pounds. Exam:  Neck:  Supple, no JVD, no carotid bruits  CV:  Normal S1 and  S2, no murmurs, rubs, or gallops noted  Lungs:  Clear to ausculation throughout, no wheezes or rales  Abd:  Soft, non-tender, non-distended with good bowel sounds. No hepatosplenomegaly  Extremities:  No edema      Data:  EKG:    Not done today      LABS:  Lab Results   Component Value Date/Time    Sodium 143 07/26/2018 06:43 AM    Potassium 4.7 07/26/2018 06:43 AM    Chloride 110 (H) 07/26/2018 06:43 AM    CO2 23 07/26/2018 06:43 AM    Glucose 136 (H) 07/26/2018 06:43 AM    BUN 27 (H) 07/26/2018 06:43 AM    Creatinine 1.43 (H) 07/26/2018 06:43 AM     Lab Results   Component Value Date/Time    Cholesterol, total 291 (H) 05/21/2018 06:23 AM    HDL Cholesterol 52 05/21/2018 06:23 AM    LDL, calculated 212.6 (H) 05/21/2018 06:23 AM    Triglyceride 132 05/21/2018 06:23 AM    CHOL/HDL Ratio 5.6 (H) 05/21/2018 06:23 AM     Lab Results   Component Value Date/Time    ALT (SGPT) 28 06/08/2018 06:30 PM         Impression/Plan:  1. Chronic systolic heart failure, appears compensated  2. CAD, s/p STEMI code/PEA/respiratory failure for which she underwent YADIRA to proximal LAD with Resolute stent on 5/20/18.  She developed acute stent restenosis shortly after the initial PCI which required emergent intubation for pulmonary edema and she underwent a second catheterization for PCI/YADIRA to the proximal LAD. 3.  Morbid obesity  4. Hyperlipidemia, on atorvastatin 20mg  5. Diabetes mellitus, recommend Hgb A1c less than 7% from cardiac standpoint    Ms. Nenita Miller was seen today for follow-up after starting Entresto 24/26 BID. During her last visit, her lasix was also decreased to 20mg daily. She has been taking the medication and states that her shortness of breath has increased somewhat. She offers no complaints of chest pain, palpitations, lightheadedness, or dizziness. She has some mild chronic shortness of breath with exertion. She has not noticed any abdominal bloating or lower extremity edema. Her blood pressure is stable as is her heart rate. She was instructed to take Lasix 40mg daily for 3 days and then back to 20mg daily. Her most recent BMP results are noted above and improvement has been noted in her creatinine. Previously, it was 1.7 and now it is 1.4. Results were discussed with her. Her Jennifer Friendly will be increased to 49/51 twice a day. If her shortness of breath does not improve with the increased dose of lasix, we may need to consider switching her from Brilinta to Plavix as this may be contributing to her complaints of shortness of breath as well. She already has a follow-up appointment with Dr. Leah North scheduled on 8/22/18. Congestive heart failure teaching reinforced today. Advised to limit sodium intake to no more than 2000mg per day and to also limit fluid intake to 48 ounces per day (unless otherwise specified). Advised to weigh daily every morning and record weights. Instructed to call our office if progressive weight gain is noted over a 2 to 3 day period of time, shortness of breath increases, or if abdominal bloating, nausea, fatigue, or increased lower extremity edema is noted.         Janak Shin MSN, FNP-BC    Please note:  Portions of this chart were created with Dragon medical speech to text program.  Unrecognized errors may be present.

## 2018-08-20 ENCOUNTER — OFFICE VISIT (OUTPATIENT)
Dept: OBGYN CLINIC | Age: 68
End: 2018-08-20

## 2018-08-20 ENCOUNTER — HOSPITAL ENCOUNTER (OUTPATIENT)
Dept: LAB | Age: 68
Discharge: HOME | End: 2018-08-20
Payer: MEDICARE

## 2018-08-20 VITALS
OXYGEN SATURATION: 100 % | DIASTOLIC BLOOD PRESSURE: 80 MMHG | HEART RATE: 69 BPM | BODY MASS INDEX: 36.19 KG/M2 | SYSTOLIC BLOOD PRESSURE: 129 MMHG | TEMPERATURE: 98 F | HEIGHT: 64 IN | WEIGHT: 212 LBS

## 2018-08-20 DIAGNOSIS — I21.02 ST ELEVATION MYOCARDIAL INFARCTION INVOLVING LEFT ANTERIOR DESCENDING (LAD) CORONARY ARTERY (HCC): ICD-10-CM

## 2018-08-20 DIAGNOSIS — E66.1 CLASS 2 DRUG-INDUCED OBESITY WITH SERIOUS COMORBIDITY AND BODY MASS INDEX (BMI) OF 36.0 TO 36.9 IN ADULT: ICD-10-CM

## 2018-08-20 DIAGNOSIS — N95.0 PMB (POSTMENOPAUSAL BLEEDING): Primary | ICD-10-CM

## 2018-08-20 DIAGNOSIS — I10 ESSENTIAL HYPERTENSION: ICD-10-CM

## 2018-08-20 DIAGNOSIS — E11.21 TYPE 2 DIABETES WITH NEPHROPATHY (HCC): ICD-10-CM

## 2018-08-20 DIAGNOSIS — Z01.419 ENCOUNTER FOR CERVICAL PAP SMEAR WITH PELVIC EXAM: ICD-10-CM

## 2018-08-20 LAB
APTT PPP: 32.4 SEC (ref 23–36.4)
INR PPP: 0.9 (ref 0.8–1.2)
PROTHROMBIN TIME: 12.2 SEC (ref 11.5–15.2)

## 2018-08-20 PROCEDURE — 85730 THROMBOPLASTIN TIME PARTIAL: CPT | Performed by: OBSTETRICS & GYNECOLOGY

## 2018-08-20 PROCEDURE — 85610 PROTHROMBIN TIME: CPT | Performed by: OBSTETRICS & GYNECOLOGY

## 2018-08-20 NOTE — MR AVS SNAPSHOT
Rosetta Eldridge 
 
 
 [facility] [address], [address] [place] [address] 
[phone] Patient: Silvia Bourgeois MRN: [id number] :1950 Visit Information Date & Time Provider Department Dept. Phone Encounter #  
 2018  2:00 PM Amparo Antoine MD [place] [id number] Follow-up Instructions Return in about 4 weeks (around 2018) for for endometrial biopsy. Your Appointments 2018 11:20 AM  
[facility] with Karyle Fairly, MD  
[facility] ([address]) Appt Note: PH f/u; Earle Hansen has been seeing for med titration [facility] [address] [address] [address] [phone] [address] [place] [address] [address] Upcoming Health Maintenance Date Due FOBT Q 1 YEAR AGE 50-75 2018 Influenza Age 5 to Adult 2018 BREAST CANCER SCRN MAMMOGRAM 2019 Allergies as of 2018  Review Complete On: 2018 By: Amparo Antoine MD  
 No Known Allergies Current Immunizations  Reviewed on 2018 Name Date Influenza Vaccine Janyth León) 10/22/2015 Influenza Vaccine (Quad) PF 10/19/2016 Influenza Vaccine (Trivalent) 10/16/2014 Influenza Vaccine PF 2014 Pneumococcal Polysaccharide (PPSV-23) 2015  1:01 PM  
 Rabies Immune Globulin 2014  1:49 PM  
 Rabies Vaccine IM 7/10/2014 10:59 AM, 7/3/2014 10:55 AM, 2014  9:28 AM, 2014  1:46 PM  
 Tdap 2014  1:06 PM  
  
 Not reviewed this visit You Were Diagnosed With   
  
 Codes Comments PMB (postmenopausal bleeding)    -  Primary ICD-10-CM: N95.0 ICD-9-CM: 627.1 Type 2 diabetes with nephropathy (HCC)     ICD-10-CM: E11.21 
ICD-9-CM: 250.40, 583.81 ST elevation myocardial infarction involving left anterior descending (LAD) coronary artery (HCC)     ICD-10-CM: I21.02 
ICD-9-CM: 410.10 Essential hypertension     ICD-10-CM: I10 
ICD-9-CM: 401.9 Encounter for cervical Pap smear with pelvic exam     ICD-10-CM: E84.857 ICD-9-CM: V76.2, V72.31 Class 2 drug-induced obesity with serious comorbidity and body mass index (BMI) of 36.0 to 36.9 in adult     ICD-10-CM: E66.1, Z68.36 
ICD-9-CM: 278.00, V85.36 Vitals BP Pulse Temp Height(growth percentile) Weight(growth percentile) SpO2  
 129/80 69 98 °F (36.7 °C) (Oral) 5' 4\" (1.626 m) 212 lb (96.2 kg) 100% BMI OB Status Smoking Status 36.39 kg/m2 Menopause Former Smoker BMI and BSA Data Body Mass Index Body Surface Area  
 36.39 kg/m 2 2.08 m 2 Preferred Pharmacy Pharmacy Name Phone [address], [address] [place] [address] [phone] Your Updated Medication List  
  
   
This list is accurate as of 8/20/18  3:04 PM.  Always use your most recent med list.  
  
  
  
  
 * albuterol 2.5 mg /3 mL (0.083 %) nebulizer solution Commonly known as:  PROVENTIL VENTOLIN  
3 mL by Nebulization route every four (4) hours as needed for Wheezing. * albuterol 90 mcg/actuation inhaler Commonly known as:  PROVENTIL HFA, VENTOLIN HFA, PROAIR HFA Take 2 Puffs by inhalation every four (4) hours as needed for Wheezing. ARAVA 20 mg tablet Generic drug:  leflunomide Take 20 mg by mouth daily. aspirin 81 mg chewable tablet Take 1 Tab by mouth daily. atorvastatin 20 mg tablet Commonly known as:  LIPITOR Take 1 Tab by mouth nightly. baclofen 10 mg tablet Commonly known as:  LIORESAL Take  by mouth three (3) times daily. carvedilol 6.25 mg tablet Commonly known as:  Cherry Buster Take 1 Tab by mouth two (2) times daily (with meals). desloratadine 5 mg tablet Commonly known as:  CLARINEX Take 1 Tab by mouth daily as needed for Allergies. ferrous sulfate 325 mg (65 mg iron) tablet Take 1 Tab by mouth two (2) times a day. Indications: IRON DEFICIENCY ANEMIA  
  
 furosemide 40 mg tablet Commonly known as:  LASIX Take 0.5 Tabs by mouth daily. gabapentin 300 mg capsule Commonly known as:  NEURONTIN  
  
 insulin detemir U-100 100 unit/mL injection Commonly known as:  LEVEMIR U-100 INSULIN  
15 units daily for diabetes  
  
 mometasone 50 mcg/actuation nasal spray Commonly known as:  NASONEX  
2 Sprays by Both Nostrils route daily as needed. For allergies  
  
 raNITIdine 150 mg tablet Commonly known as:  ZANTAC Take 1 Tab by mouth two (2) times a day. sacubitril-valsartan 49-51 mg Tab tablet Commonly known as:  ENTRESTO Take 1 Tab by mouth two (2) times a day. ticagrelor 90 mg tablet Commonly known as:  [employer] Take 1 Tab by mouth two (2) times a day. umeclidinium-vilanterol 62.5-25 mcg/actuation inhaler Commonly known as:  Golden [place] Take 1 Puff by inhalation daily. For COPD * Notice: This list has 2 medication(s) that are the same as other medications prescribed for you. Read the directions carefully, and ask your doctor or other care provider to review them with you. Follow-up Instructions Return in about 4 weeks (around 9/17/2018) for for endometrial biopsy. Patient Instructions Vaginal Bleeding After Menopause: Care Instructions Your Care Instructions Vaginal bleeding after menopause can have many causes. Causes may include infection, inflammation, prescription hormones, abnormal growths, and injury. Your doctor may want you to have more tests to find the cause of your vaginal bleeding. Follow-up care is a key part of your treatment and safety. Be sure to make and go to all appointments, and call your doctor if you are having problems. It's also a good idea to know your test results and keep a list of the medicines you take. How can you care for yourself at home? · If your doctor gave you medicine, take it exactly as prescribed. Call your doctor if you think you are having a problem with your medicine. · Do not have sex or put anything inside your vagina until you talk with your doctor. · Do not douche. When should you call for help? Call 911 anytime you think you may need emergency care. For example, call if: 
  · You passed out (lost consciousness).  
 Call your doctor now or seek immediate medical care if: 
  · You have severe vaginal bleeding.  
  · You are dizzy or lightheaded, or you feel like you may faint.  
  · You have new or worse belly or pelvic pain.  
 Watch closely for changes in your health, and be sure to contact your doctor if: 
  · Your bleeding gets worse.  
  · You think you might be pregnant.  
  · You do not get better as expected. Where can you learn more? Go to http://jason-charity.info/. Enter N304 in the search box to learn more about \"Vaginal Bleeding After Menopause: Care Instructions. \" Current as of: October 6, 2017 Content Version: 11.7 © 8321-0248 [employer]isk. Care instructions adapted under license by [employer] (which disclaims liability or warranty for this information). If you have questions about a medical condition or this instruction, always ask your healthcare professional. [address] any warranty or liability for your use of this information. Endometrial Biopsy: About This Test 
What is it? An endometrial biopsy is a way for your doctor to take a small sample of the lining of the uterus (endometrium). The sample is looked at under a microscope for abnormal cells. An endometrial biopsy helps your doctor find problems in the endometrium. Why is this test done? An endometrial biopsy is done to check for cancer of the uterus.  The test is also done if you have abnormal bleeding from your uterus or are having problems getting pregnant. The test results show how your body's hormones are affecting the lining of the uterus. How can you prepare for the test? 
Talk to your doctor about all your health conditions before the test. For example, tell your doctor if you: · Are or might be pregnant. An endometrial biopsy is not done during pregnancy. · Are taking any medicines. · Are allergic to any medicines. · Have had bleeding problems, or if you take aspirin or some other blood thinner. · Have been treated for an infection in your pelvic area. · Have any heart or lung problems. Other ways to prepare: · Do not douche, use tampons, or use vaginal medicines for 24 hours before the test. 
· Ask your doctor if you should take a pain reliever, such as ibuprofen (Advil or Motrin), 30 to 60 minutes before the test. This can help reduce any cramping pain that the test can cause. · Talk to your doctor if you have concerns about the need for the test, its risks, how it will be done, or what the results may mean. What happens before the test? 
· You will empty your bladder just before the test. 
· You will be asked to sign a consent form that says you understand the risks of the test and agree to have it done. What happens during the test? 
· You will lie on an exam table. Your feet will be in stirrups. · The doctor may use a spray or injection to numb your cervix. The cervix is the opening to the uterus. · The doctor will use a tool called a speculum to see the cervix. · Then the doctor will pass a thin tube through the cervix to take a sample of the uterus lining. You may feel a sharp cramp when the doctor collects the sample. · The sample is sent to a lab. How long does the test take? The test will take about 5 to 15 minutes. What happens after the test? 
· You will probably be able to go home right away.  
· You likely will have mild vaginal bleeding and may have cramps for a few days after the test. The cramps may feel like bad menstrual cramps. · Ask your doctor if you can take an over-the-counter pain medicine, such as acetaminophen (Tylenol), ibuprofen (Advil, Motrin), or naproxen (Aleve). Be safe with medicines. Read and follow all instructions on the label. · Do not have sex, use tampons, or douche until the spotting stops. Use pads for vaginal bleeding or discharge. · Do not do strenuous exercise or heavy lifting for one day after your biopsy. Follow-up care is a key part of your treatment and safety. Be sure to make and go to all appointments, and call your doctor if you are having problems. It's also a good idea to keep a list of the medicines you take. Ask your doctor when you can expect to have your test results. Where can you learn more? Go to http://jason-charity.info/. Enter [phone] in the search box to learn more about \"Endometrial Biopsy: About This Test.\" Current as of: October 6, 2017 Content Version: 11.7 © 9565-5831 Work[employer]. Care instructions adapted under license by [employer] (which disclaims liability or warranty for this information). If you have questions about a medical condition or this instruction, always ask your healthcare professional. [address] any warranty or liability for your use of this information. Hysteroscopy: Before Your Procedure What is a hysteroscopy? A hysteroscopy is a procedure to find and treat problems with your uterus. It may be done to remove growths from the uterus. Or it may be done to diagnose or treat fertility problems. It can also be done to diagnose or treat abnormal bleeding. The doctor will guide a lighted tube through the cervix and into the uterus. This tube is called a hysteroscope, or scope. It lets your doctor see inside your body. The doctor will fill your uterus with air or liquid. This makes it easier to see the inside of your uterus with the scope. The doctor may also put tools through the scope to treat a problem. During this procedure, the doctor may take out a small piece of tissue for study. This is called a biopsy. Or the doctor may gently scrape tissue from the inner wall of the uterus. This is called a dilation and curettage, or D&C. If your doctor filled your uterus with liquid, most it will flow out when the scope is removed. You will most likely go home the same day. Many women are able to go back to work the next day. But it dependson what was done and the type of work you do. Follow-up care is a key part of your treatment and safety. Be sure to make and go to all appointments, and call your doctor if you are having problems. It's also a good idea to know your test results and keep a list of the medicines you take. What happens before the procedure? 
 Preparing for the procedure 
  · Understand exactly what procedure is planned, along with the risks, benefits, and other options. · Tell your doctors ALL the medicines, vitamins, supplements, and herbal remedies you take. Some of these can increase the risk of bleeding or interact with anesthesia.  
  · If you take aspirin or some other blood thinner, be sure to talk to your doctor. He or she will tell you if you should stop taking it before your procedure. Make sure that you understand exactly what your doctor wants you to do.  
  · Your doctor will tell you which medicines to take or stop before your procedure. You may need to stop taking certain medicines a week or more before the procedure. So talk to your doctor as soon as you can.  
  · If you have an advance directive, let your doctor know. It may include a living will and a durable power of  for health care. Bring a copy to the hospital. If you don't have one, you may want to prepare one. It lets your doctor and loved ones know your health care wishes.  Doctors advise that everyone prepare these papers before any type of surgery or procedure. Procedures can be stressful. This information will help you understand what you can expect. And it will help you safely prepare for your procedure. What happens on the day of the procedure? · Follow the instructions exactly about when to stop eating and drinking. If you don't, your procedure may be canceled. If your doctor has instructed you to take your medicines on the day of the procedure, take them with only a sip of water.  
  · Take a bath or shower before you come in for your procedure. Do not apply lotions, perfumes, deodorants, or nail polish.  
  · Take off all jewelry and piercings. And take out contact lenses, if you wear them.  
 At the hospital or surgery center · Bring a picture ID.  
  · You will be kept comfortable and safe by your anesthesia provider. The anesthesia may make you sleep. Or it may just numb the area being worked on.  
  · The procedure usually takes less than 30 minutes. Going home · Be sure you have someone to drive you home. Anesthesia and pain medicine make it unsafe for you to drive.  
  · You will be given more specific instructions about recovering from your procedure. They will cover things like diet, wound care, follow-up care, driving, and getting back to your normal routine. When should you call your doctor? · You have questions or concerns.  
  · You don't understand how to prepare for your procedure.  
  · You become ill before the procedure (such as fever, flu, or a cold).  
  · You need to reschedule or have changed your mind about having the procedure. Where can you learn more? Go to http://jason-charity.info/. Enter M928 in the search box to learn more about \"Hysteroscopy: Before Your Procedure. \" Current as of: October 6, 2017 Content Version: 11.7 © 4275-4945 [employer].  Care instructions adapted under license by [address] (which disclaims liability or warranty for this information). If you have questions about a medical condition or this instruction, always ask your healthcare professional. [address] any warranty or liability for your use of this information. Hysteroscopy: What to Expect at [facility] Your Recovery A hysteroscopy is a procedure to find and treat problems with your uterus. It may have been done to remove growths from the uterus. Or it may have been done to diagnose or treat fertility problems. It can also be used to diagnose or treat abnormal bleeding. If the doctor filled your uterus with air, you may have gas pains or your belly may feel full. You may have cramps and light vaginal bleeding for a few days to several weeks. The cramps and bleeding may last longer if the hysteroscopy was used for treatment. You may also have shoulder pain right after the procedure. If the doctor filled your uterus with liquid, you may have watery vaginal discharge for several weeks. This care sheet gives you a general idea about how long it will take for you to recover. But each person recovers at a different pace. Follow the steps below to get better as quickly as possible. How can you care for yourself at home? Activity 
  · Rest when you feel tired.  
  · You can do your normal activities when it feels okay to do so.  
  · You may shower and take baths as usual.  
  · Ask your doctor when it is okay for you to have sex. Diet 
  · You can eat your normal diet. If your stomach is upset, try bland, low-fat foods like plain rice, broiled chicken, toast, and yogurt.  
  · If your bowel movements are not regular right after surgery, try to avoid constipation and straining. Drink plenty of water. Your doctor may suggest fiber, a stool softener, or a mild laxative. Medicines 
  · Your doctor will tell you if and when you can restart your medicines. He or she will also give you instructions about taking any new medicines.  
  · If you take aspirin or some other blood thinner, be sure to talk to your doctor. He or she will tell you if and when to start taking this medicine again. Make sure that you understand exactly what your doctor wants you to do.  
  · Be safe with medicines. Read and follow all instructions on the label. ¨ If the doctor gave you a prescription medicine for pain, take it as prescribed. ¨ If you are not taking a prescription pain medicine, ask your doctor if you can take an over-the-counter medicine.  
  · If your doctor prescribed antibiotics, take them as directed. Do not stop taking them just because you feel better. You need to take the full course of antibiotics. Other instructions 
  · You may have some light vaginal bleeding. Wear sanitary pads if needed. Do not use tampons until your doctor says it is okay.  
  · You may want to use a heating pad on your belly to help with pain. Use a low heat setting.  
  · Talk about birth control with your doctor. Do not try to become pregnant until your doctor says it is okay. Follow-up care is a key part of your treatment and safety. Be sure to make and go to all appointments, and call your doctor if you are having problems. It's also a good idea to know your test results and keep a list of the medicines you take. When should you call for help? Call 911 anytime you think you may need emergency care. For example, call if: 
  · You passed out (lost consciousness).  
  · You have chest pain, are short of breath, or cough up blood.  
 Call your doctor now or seek immediate medical care if: 
  · You have pain that does not get better after you take pain medicine.  
  · You cannot pass stools or gas.  
  · You have vaginal discharge that has increased in amount or smells bad.  
  · You are sick to your stomach or cannot drink fluids.  
  · You have signs of infection, such as: ¨ Increased pain, swelling, warmth, or redness. ¨ A fever.  
  · You have bright red vaginal bleeding that soaks one or more pads in an hour, or you have large clots.  
  · You have signs of a blood clot in your leg (called a deep vein thrombosis), such as: 
¨ Pain in your calf, back of the knee, thigh, or groin. ¨ Redness and swelling in your leg.  
 Watch closely for changes in your health, and be sure to contact your doctor if you have any problems. Where can you learn more? Go to http://jason-charity.info/. Enter I360 in the search box to learn more about \"Hysteroscopy: What to Expect at Home. \" Current as of: October 6, 2017 Content Version: 11.7 © 8890-0884 [employer]. Care instructions adapted under license by [employer] (which disclaims liability or warranty for this information). If you have questions about a medical condition or this instruction, always ask your healthcare professional. [place] [address] any warranty or liability for your use of this information. Introducing [facility]! Dawood Briceno introduces [employer] [employer] patient portal. Now you can access parts of your medical record, email your doctor's office, and request medication refills online. 1. In your internet browser, go to [email or web address] 2. Click on the First Time User? Click Here link in the Sign In box. You will see the New Member Sign Up page. 3. Enter your [facility] [employer] [facility] Access Code exactly as it appears below. You will not need to use this code after youve completed the sign-up process. If you do not sign up before the expiration date, you must request a new code. · [facility] [employer] [facility] Access Code: [email or web address] Expires: 11/18/2018  3:04 PM 
 
4. Enter the last four digits of your Social Security Number (xxxx) and Date of Birth (mm/dd/yyyy) as indicated and click Submit. You will be taken to the next sign-up page. 5. Create a Bandwidth ID. This will be your Bandwidth login ID and cannot be changed, so think of one that is secure and easy to remember. 6. Create a Bandwidth password. You can change your password at any time. 7. Enter your Password Reset Question and Answer. This can be used at a later time if you forget your password. 8. Enter your e-mail address. You will receive e-mail notification when new information is available in [address] 9. Click Sign Up. You can now view and download portions of your medical record. 10. Click the Download Summary menu link to download a portable copy of your medical information. If you have questions, please visit the Frequently Asked Questions section of the Bandwidth website. Remember, [employer] is NOT to be used for urgent needs. For medical emergencies, dial 911. Now available from your iPhone and Android! Please provide this summary of care documentation to your next provider. Your primary care clinician is listed as Juan Carlos Franklin. If you have any questions after today's visit, please call [phone].

## 2018-08-20 NOTE — PROGRESS NOTES
Name: Andrzej Plascencia MRN: [id number]    YOB: 1950  Age: 79 y.o. Sex: female        Chief Complaint   Patient presents with    Follow-up     test       HPI  1. PMB (postmenopausal bleeding)  Reviewed the patient that likely her postmenopausal bleeding was called by her blood thinner. Discussed other possibilities like atrophy, fibroids, polyp or even hyperplasia or uterine cancer. She has not had any other bleeding since being on the progesterone therapy. Will order an ultrasound. If her stripe is thin will continue with expectant management, if not we will plan for tissue biopsy.     - US PELV NON OB W TV see below  - PAP IG, CT-NG-TV, neg, HR HPV neg, GC/Chl/TV neg     2. Type 2 diabetes with nephropathy (HCC)        3. ST elevation myocardial infarction involving left anterior descending (LAD) coronary artery (HCC)  Status post stent placement for her myocardial infarction, on chronic anticoagulation.     4. Essential hypertension  Blood pressure mildly elevated today, on her medications     5. Encounter for cervical Pap smear with pelvic exam    - PAP IG, CT-NG-TV, neg, HR HPV neg, GC/Chl/TV neg     6. Obesity BMI 36  Risk factor for hyperplasia and carcinoma    OB History      Para Term  AB Living    3 3 3   3    SAB TAB Ectopic Molar Multiple Live Births         3        Obstetric Comments      Patient is not currently having periods (Reason: Menopause).   History of sexually transmitted infections gonorrhea  Last pap 2018 neg, HR HPV neg           PGyn    History   Sexual Activity    Sexual activity: Yes    Partners: Male         Past Medical History:   Diagnosis Date    Abnormal WBC count 2016    Bilateral shoulder pain 2016    Chondromalacia of both patellae     COPD (chronic obstructive pulmonary disease) ([facility] [address] 2015    mild-mod dz; Dr Palomo Simons    Diabetes [facility])     Diabetic eye exam ([facility] [address] 2016    Dilated cardiomyopathy ([facility] [address]     Dyslipidemia  Heart attack ([facility] [address]     History of echocardiogram 11/14/2014    Mild LVE. EF 40%. Mild, diffuse hypk. Mild LAE. Mild MR.      Hypercholesteremia 1/08/14    Hypertension 2000    Noncompliance with medications 2/16/2016    Obesity     Orthostatic hypotension 5/17/2016    Osteoarthritis of both knees     Pain management 04/01/2016    Dr. Esther Nunez Popliteal cyst, bilateral      Vitamin D deficiency 10/16/14       Past Surgical History:   Procedure Laterality Date    HX HEART CATHETERIZATION      HX TUBAL LIGATION         No Known Allergies    Current Outpatient Prescriptions on File Prior to Visit   Medication Sig Dispense Refill    furosemide (LASIX) 40 mg tablet Take 0.5 Tabs by mouth daily. 1 Tab 0    sacubitril-valsartan (ENTRESTO) 49 mg/51 mg tablet Take 1 Tab by mouth two (2) times a day. 60 Tab 4    baclofen (LIORESAL) 10 mg tablet Take  by mouth three (3) times daily.  gabapentin (NEURONTIN) 300 mg capsule       raNITIdine (ZANTAC) 150 mg tablet Take 1 Tab by mouth two (2) times a day. 60 Tab 4    carvedilol (COREG) 6.25 mg tablet Take 1 Tab by mouth two (2) times daily (with meals). 60 Tab 6    leflunomide (ARAVA) 20 mg tablet Take 20 mg by mouth daily.  ticagrelor (BRILINTA) 90 mg tablet Take 1 Tab by mouth two (2) times a day. 60 Tab 11    atorvastatin (LIPITOR) 20 mg tablet Take 1 Tab by mouth nightly. 30 Tab 0    aspirin 81 mg chewable tablet Take 1 Tab by mouth daily. 30 Tab 0    insulin detemir (LEVEMIR) 100 unit/mL injection 15 units daily for diabetes 2 Vial 0    albuterol (PROVENTIL HFA, VENTOLIN HFA, PROAIR HFA) 90 mcg/actuation inhaler Take 2 Puffs by inhalation every four (4) hours as needed for Wheezing. 1 Inhaler 3    desloratadine (CLARINEX) 5 mg tablet Take 1 Tab by mouth daily as needed for Allergies. 30 Tab 0    mometasone (NASONEX) 50 mcg/actuation nasal spray 2 Sprays by Both Nostrils route daily as needed.  For allergies 3 Container 3    umeclidinium-vilanterol (ANORO ELLIPTA) 62.5-25 mcg/actuation inhaler Take 1 Puff by inhalation daily. For COPD 3 Inhaler 3    albuterol (PROVENTIL VENTOLIN) 2.5 mg /3 mL (0.083 %) nebulizer solution 3 mL by Nebulization route every four (4) hours as needed for Wheezing. 60 Each 3    ferrous sulfate 325 mg (65 mg iron) tablet Take 1 Tab by mouth two (2) times a day. Indications: IRON DEFICIENCY ANEMIA 60 Tab 11     No current facility-administered medications on file prior to visit. Review of Systems   Constitutional: Negative. Gastrointestinal: Negative. Genitourinary: Negative. Visit Vitals    /80    Pulse 69    Temp 98 °F (36.7 °C) (Oral)    Ht 5' 4\" (1.626 m)    Wt 212 lb (96.2 kg)    SpO2 100%    BMI 36.39 kg/m2       GENERAL:  Well developed, well nourished, in no distress        No results found for this or any previous visit (from the past 24 hour(s)). ICD-10-CM ICD-9-CM   1. PMB (postmenopausal bleeding) N95.0 627.1   2. Type 2 diabetes with nephropathy (HCC) E11.21 250.40     583.81   3. ST elevation myocardial infarction involving left anterior descending (LAD) coronary artery ([facility]) I21.02 410.10   4. Essential hypertension I10 401.9   5. Encounter for cervical Pap smear with pelvic exam Z01.419 V76.2     V72.31   6. Class 2 drug-induced obesity with serious comorbidity and body mass index (BMI) of 36.0 to 36.9 in adult E66.1 278.00    Z68.36 V85.36       1. PMB (postmenopausal bleeding)  Reviewed US with patient and discussed possible etiologies. Reviewed need for tissue biopsy and offered office biopsy vs hysteroscopy with D&C. All of her questions were answered. R/B/A of hysteroscopy with D&C reviewed including but not limited to anesthesia, infection, bleeding, bleeding requiring blood transfusion, injury to internal organs, perforation, as well as electrolyte abnormalities due to absorption of saline. All of her questions were answered.        2. Type 2 diabetes with nephropathy ([facility] [address]  Risk factor endometrial hyperplasia and endometrial cancer    3. ST elevation myocardial infarction involving left anterior descending (LAD) coronary artery (HCC)  On Brintilla, check PT/PTT    4. Essential hypertension  Good today    5. Encounter for cervical Pap smear with pelvic exam  Normal    6.  Class 2 drug-induced obesity with serious comorbidity and body mass index (BMI) of 36.0 to 36.9 in adult  Risk factor endometrial hyperplasia and endometrial cancer    F/U 3-4 weeks    Face to face time 25 mins with greater than 50% counseling

## 2018-08-20 NOTE — PATIENT INSTRUCTIONS
Vaginal Bleeding After Menopause: Care Instructions  Your Care Instructions    Vaginal bleeding after menopause can have many causes. Causes may include infection, inflammation, prescription hormones, abnormal growths, and injury. Your doctor may want you to have more tests to find the cause of your vaginal bleeding. Follow-up care is a key part of your treatment and safety. Be sure to make and go to all appointments, and call your doctor if you are having problems. It's also a good idea to know your test results and keep a list of the medicines you take. How can you care for yourself at home? · If your doctor gave you medicine, take it exactly as prescribed. Call your doctor if you think you are having a problem with your medicine. · Do not have sex or put anything inside your vagina until you talk with your doctor. · Do not douche. When should you call for help? Call 911 anytime you think you may need emergency care. For example, call if:    · You passed out (lost consciousness).    Call your doctor now or seek immediate medical care if:    · You have severe vaginal bleeding.     · You are dizzy or lightheaded, or you feel like you may faint.     · You have new or worse belly or pelvic pain.    Watch closely for changes in your health, and be sure to contact your doctor if:    · Your bleeding gets worse.     · You think you might be pregnant.     · You do not get better as expected. Where can you learn more? Go to http://jason-charity.info/. Enter N304 in the search box to learn more about \"Vaginal Bleeding After Menopause: Care Instructions. \"  Current as of: October 6, 2017  Content Version: 11.7  © 3897-0224 Healthwise, Incorporated. Care instructions adapted under license by [employer] (which disclaims liability or warranty for this information).  If you have questions about a medical condition or this instruction, always ask your healthcare professional. [facility] disclaims any warranty or liability for your use of this information. Endometrial Biopsy: About This Test  What is it? An endometrial biopsy is a way for your doctor to take a small sample of the lining of the uterus (endometrium). The sample is looked at under a microscope for abnormal cells. An endometrial biopsy helps your doctor find problems in the endometrium. Why is this test done? An endometrial biopsy is done to check for cancer of the uterus. The test is also done if you have abnormal bleeding from your uterus or are having problems getting pregnant. The test results show how your body's hormones are affecting the lining of the uterus. How can you prepare for the test?  Talk to your doctor about all your health conditions before the test. For example, tell your doctor if you:  · Are or might be pregnant. An endometrial biopsy is not done during pregnancy. · Are taking any medicines. · Are allergic to any medicines. · Have had bleeding problems, or if you take aspirin or some other blood thinner. · Have been treated for an infection in your pelvic area. · Have any heart or lung problems. Other ways to prepare:  · Do not douche, use tampons, or use vaginal medicines for 24 hours before the test.  · Ask your doctor if you should take a pain reliever, such as ibuprofen (Advil or Motrin), 30 to 60 minutes before the test. This can help reduce any cramping pain that the test can cause. · Talk to your doctor if you have concerns about the need for the test, its risks, how it will be done, or what the results may mean. What happens before the test?  · You will empty your bladder just before the test.  · You will be asked to sign a consent form that says you understand the risks of the test and agree to have it done. What happens during the test?  · You will lie on an exam table. Your feet will be in stirrups. · The doctor may use a spray or injection to numb your cervix.  The cervix is the opening to the uterus. · The doctor will use a tool called a speculum to see the cervix. · Then the doctor will pass a thin tube through the cervix to take a sample of the uterus lining. You may feel a sharp cramp when the doctor collects the sample. · The sample is sent to a lab. How long does the test take? The test will take about 5 to 15 minutes. What happens after the test?  · You will probably be able to go home right away. · You likely will have mild vaginal bleeding and may have cramps for a few days after the test. The cramps may feel like bad menstrual cramps. · Ask your doctor if you can take an over-the-counter pain medicine, such as acetaminophen (Tylenol), ibuprofen (Advil, Motrin), or naproxen (Aleve). Be safe with medicines. Read and follow all instructions on the label. · Do not have sex, use tampons, or douche until the spotting stops. Use pads for vaginal bleeding or discharge. · Do not do strenuous exercise or heavy lifting for one day after your biopsy. Follow-up care is a key part of your treatment and safety. Be sure to make and go to all appointments, and call your doctor if you are having problems. It's also a good idea to keep a list of the medicines you take. Ask your doctor when you can expect to have your test results. Where can you learn more? Go to http://jason-charity.info/. Enter [phone] in the search box to learn more about \"Endometrial Biopsy: About This Test.\"  Current as of: October 6, 2017  Content Version: 11.7  © 0014-6205 Healthwise, Incorporated. Care instructions adapted under license by [employer] (which disclaims liability or warranty for this information). If you have questions about a medical condition or this instruction, always ask your healthcare professional. [address] any warranty or liability for your use of this information.        Hysteroscopy: Before Your Procedure  What is a hysteroscopy? A hysteroscopy is a procedure to find and treat problems with your uterus. It may be done to remove growths from the uterus. Or it may be done to diagnose or treat fertility problems. It can also be done to diagnose or treat abnormal bleeding. The doctor will guide a lighted tube through the cervix and into the uterus. This tube is called a hysteroscope, or scope. It lets your doctor see inside your body. The doctor will fill your uterus with air or liquid. This makes it easier to see the inside of your uterus with the scope. The doctor may also put tools through the scope to treat a problem. During this procedure, the doctor may take out a small piece of tissue for study. This is called a biopsy. Or the doctor may gently scrape tissue from the inner wall of the uterus. This is called a dilation and curettage, or D&C. If your doctor filled your uterus with liquid, most it will flow out when the scope is removed. You will most likely go home the same day. Many women are able to go back to work the next day. But it dependson what was done and the type of work you do. Follow-up care is a key part of your treatment and safety. Be sure to make and go to all appointments, and call your doctor if you are having problems. It's also a good idea to know your test results and keep a list of the medicines you take. What happens before the procedure?   Preparing for the procedure    · Understand exactly what procedure is planned, along with the risks, benefits, and other options. · Tell your doctors ALL the medicines, vitamins, supplements, and herbal remedies you take. Some of these can increase the risk of bleeding or interact with anesthesia.     · If you take aspirin or some other blood thinner, be sure to talk to your doctor. He or she will tell you if you should stop taking it before your procedure.  Make sure that you understand exactly what your doctor wants you to do.     · Your doctor will tell you which medicines to take or stop before your procedure. You may need to stop taking certain medicines a week or more before the procedure. So talk to your doctor as soon as you can.     · If you have an advance directive, let your doctor know. It may include a living will and a durable power of  for health care. Bring a copy to the hospital. If you don't have one, you may want to prepare one. It lets your doctor and loved ones know your health care wishes. Doctors advise that everyone prepare these papers before any type of surgery or procedure. Procedures can be stressful. This information will help you understand what you can expect. And it will help you safely prepare for your procedure. What happens on the day of the procedure? · Follow the instructions exactly about when to stop eating and drinking. If you don't, your procedure may be canceled. If your doctor has instructed you to take your medicines on the day of the procedure, take them with only a sip of water.     · Take a bath or shower before you come in for your procedure. Do not apply lotions, perfumes, deodorants, or nail polish.     · Take off all jewelry and piercings. And take out contact lenses, if you wear them.    At the hospital or surgery center   · Bring a picture ID.     · You will be kept comfortable and safe by your anesthesia provider. The anesthesia may make you sleep. Or it may just numb the area being worked on.     · The procedure usually takes less than 30 minutes. Going home   · Be sure you have someone to drive you home. Anesthesia and pain medicine make it unsafe for you to drive.     · You will be given more specific instructions about recovering from your procedure. They will cover things like diet, wound care, follow-up care, driving, and getting back to your normal routine. When should you call your doctor?    · You have questions or concerns.     · You don't understand how to prepare for your procedure.     · You become ill before the procedure (such as fever, flu, or a cold).     · You need to reschedule or have changed your mind about having the procedure. Where can you learn more? Go to http://jason-charity.info/. Enter T147 in the search box to learn more about \"Hysteroscopy: Before Your Procedure. \"  Current as of: October 6, 2017  Content Version: 11.7  © 9940-6666 [employer]. Care instructions adapted under license by [employer] (which disclaims liability or warranty for this information). If you have questions about a medical condition or this instruction, always ask your healthcare professional. [place] [address] any warranty or liability for your use of this information. [facility] is a procedure to find and treat problems with your uterus. It may have been done to remove growths from the uterus. Or it may have been done to diagnose or treat fertility problems. It can also be used to diagnose or treat abnormal bleeding. If the doctor filled your uterus with air, you may have gas pains or your belly may feel full. You may have cramps and light vaginal bleeding for a few days to several weeks. The cramps and bleeding may last longer if the hysteroscopy was used for treatment. You may also have shoulder pain right after the procedure. If the doctor filled your uterus with liquid, you may have watery vaginal discharge for several weeks. This care sheet gives you a general idea about how long it will take for you to recover. But each person recovers at a different pace. Follow the steps below to get better as quickly as possible. How can you care for yourself at home? Activity    · Rest when you feel tired.     · You can do your normal activities when it feels okay to do so.     · You may shower and take baths as usual.     · Ask your doctor when it is okay for you to have sex. Diet    · You can eat your normal diet. If your stomach is upset, try bland, low-fat foods like plain rice, broiled chicken, toast, and yogurt.     · If your bowel movements are not regular right after surgery, try to avoid constipation and straining. Drink plenty of water. Your doctor may suggest fiber, a stool softener, or a mild laxative. Medicines    · Your doctor will tell you if and when you can restart your medicines. He or she will also give you instructions about taking any new medicines.     · If you take aspirin or some other blood thinner, be sure to talk to your doctor. He or she will tell you if and when to start taking this medicine again. Make sure that you understand exactly what your doctor wants you to do.     · Be safe with medicines. Read and follow all instructions on the label. ¨ If the doctor gave you a prescription medicine for pain, take it as prescribed. ¨ If you are not taking a prescription pain medicine, ask your doctor if you can take an over-the-counter medicine.     · If your doctor prescribed antibiotics, take them as directed. Do not stop taking them just because you feel better. You need to take the full course of antibiotics. Other instructions    · You may have some light vaginal bleeding. Wear sanitary pads if needed. Do not use tampons until your doctor says it is okay.     · You may want to use a heating pad on your belly to help with pain. Use a low heat setting.     · Talk about birth control with your doctor. Do not try to become pregnant until your doctor says it is okay. Follow-up care is a key part of your treatment and safety. Be sure to make and go to all appointments, and call your doctor if you are having problems. It's also a good idea to know your test results and keep a list of the medicines you take. When should you call for help? Call 911 anytime you think you may need emergency care. For example, call if:    · You passed out (lost consciousness).   · You have chest pain, are short of breath, or cough up blood.    Call your doctor now or seek immediate medical care if:    · You have pain that does not get better after you take pain medicine.     · You cannot pass stools or gas.     · You have vaginal discharge that has increased in amount or smells bad.     · You are sick to your stomach or cannot drink fluids.     · You have signs of infection, such as:  ¨ Increased pain, swelling, warmth, or redness. ¨ A fever.     · You have bright red vaginal bleeding that soaks one or more pads in an hour, or you have large clots.     · You have signs of a blood clot in your leg (called a deep vein thrombosis), such as:  ¨ Pain in your calf, back of the knee, thigh, or groin. ¨ Redness and swelling in your leg.    Watch closely for changes in your health, and be sure to contact your doctor if you have any problems. Where can you learn more? Go to http://jason-charity.info/. Enter I360 in the search box to learn more about \"Hysteroscopy: What to Expect at Home. \"  Current as of: October 6, 2017  Content Version: 11.7  © 4321-5598 [employer]. Care instructions adapted under license by [employer] (which disclaims liability or warranty for this information). If you have questions about a medical condition or this instruction, always ask your healthcare professional. [address] any warranty or liability for your use of this information.

## 2018-08-21 ENCOUNTER — HOSPITAL ENCOUNTER (OUTPATIENT)
Dept: MAMMOGRAPHY | Age: 68
Discharge: HOME | End: 2018-08-21
Payer: MEDICARE

## 2018-08-21 DIAGNOSIS — Z12.31 ENCOUNTER FOR SCREENING MAMMOGRAM FOR BREAST CANCER: ICD-10-CM

## 2018-08-21 PROCEDURE — 77063 BREAST TOMOSYNTHESIS BI: CPT

## 2018-08-23 ENCOUNTER — OFFICE VISIT (OUTPATIENT)
Dept: CARDIOLOGY CLINIC | Age: 68
End: 2018-08-23

## 2018-08-23 VITALS
SYSTOLIC BLOOD PRESSURE: 98 MMHG | OXYGEN SATURATION: 97 % | BODY MASS INDEX: 36.02 KG/M2 | DIASTOLIC BLOOD PRESSURE: 64 MMHG | HEIGHT: 64 IN | HEART RATE: 71 BPM | WEIGHT: 211 LBS

## 2018-08-23 DIAGNOSIS — I10 ESSENTIAL HYPERTENSION: ICD-10-CM

## 2018-08-23 DIAGNOSIS — I25.10 CORONARY ARTERY DISEASE INVOLVING NATIVE CORONARY ARTERY OF NATIVE HEART WITHOUT ANGINA PECTORIS: Primary | ICD-10-CM

## 2018-08-23 DIAGNOSIS — I50.22 CHRONIC SYSTOLIC CONGESTIVE HEART FAILURE, NYHA CLASS 3 (HCC): ICD-10-CM

## 2018-08-23 DIAGNOSIS — E78.5 DYSLIPIDEMIA, GOAL LDL BELOW 70: ICD-10-CM

## 2018-08-23 DIAGNOSIS — I25.5 ISCHEMIC CARDIOMYOPATHY: ICD-10-CM

## 2018-08-23 DIAGNOSIS — I21.02 ST ELEVATION MYOCARDIAL INFARCTION INVOLVING LEFT ANTERIOR DESCENDING (LAD) CORONARY ARTERY (HCC): ICD-10-CM

## 2018-08-23 DIAGNOSIS — E11.21 TYPE 2 DIABETES WITH NEPHROPATHY (HCC): ICD-10-CM

## 2018-08-23 NOTE — MR AVS SNAPSHOT
[address] [place] [address] [address] [address] [address] [address] [phone] Patient: Graciela Kong MRN: [id number] :1950 Visit Information Date & Time Provider Department Dept. Phone Encounter #  
 2018 11:20 AM Wilman Bello MD [facility] [address] [id number] Your Appointments 2018  9:00 AM  
Office Visit with lEizabeth Skinner MD  
[facility] ([facility]) Appt Note: biopsy 30 min appt [address], [place] [address] [place] [id number]  
[phone]  
  
   
 [address], [address] [facility] Upcoming Health Maintenance Date Due  
 EYE EXAM RETINAL OR DILATED Q1 2017 Pneumococcal 65+ Low/Medium Risk (1 of 2 - PCV13) 2017 MICROALBUMIN Q1 2017 FOOT EXAM Q1 2018 FOBT Q 1 YEAR AGE 50-75 2018 MEDICARE YEARLY EXAM 3/20/2018 GLAUCOMA SCREENING Q2Y 2018 Influenza Age 5 to Adult 2018 HEMOGLOBIN A1C Q6M 12/10/2018 LIPID PANEL Q1 2019 BREAST CANCER SCRN MAMMOGRAM 2020 DTaP/Tdap/Td series (2 - Td) 2024 Allergies as of 2018  Review Complete On: 2018 By: Elizabeth Skinner MD  
 No Known Allergies Current Immunizations  Reviewed on 2018 Name Date Influenza Vaccine Shi Abernathy) 10/22/2015 Influenza Vaccine (Quad) PF 10/19/2016 Influenza Vaccine (Trivalent) 10/16/2014 Influenza Vaccine PF 2014 Pneumococcal Polysaccharide (PPSV-23) 2015  1:01 PM  
 Rabies Immune Globulin 2014  1:49 PM  
 Rabies Vaccine IM 7/10/2014 10:59 AM, 7/3/2014 10:55 AM, 2014  9:28 AM, 2014  1:46 PM  
 Tdap 2014  1:06 PM  
  
 Not reviewed this visit You Were Diagnosed With   
  
 Codes Comments Coronary artery disease involving native coronary artery of native heart without angina pectoris    -  Primary ICD-10-CM: I25.10 ICD-9-CM: 414.01   
 ST elevation myocardial infarction involving left anterior descending (LAD) coronary artery (HCC)     ICD-10-CM: I21.02 
ICD-9-CM: 410.10 Dyslipidemia, goal LDL below 70     ICD-10-CM: E78.5 ICD-9-CM: 272.4 Essential hypertension     ICD-10-CM: I10 
ICD-9-CM: 401.9 Cardiomyopathy, dilated ([facility] [address]     ICD-10-CM: I42.0 ICD-9-CM: 425.4 Type 2 diabetes with nephropathy (HCC)     ICD-10-CM: E11.21 
ICD-9-CM: 250.40, 583.81 Vitals BP Pulse Height(growth percentile) Weight(growth percentile) SpO2 BMI  
 98/64 71 5' 4\" (1.626 m) 211 lb (95.7 kg) 97% 36.22 kg/m2 OB Status Smoking Status Menopause Former Smoker Vitals History BMI and BSA Data Body Mass Index Body Surface Area  
 36.22 kg/m 2 2.08 m 2 Preferred Pharmacy Pharmacy Name Phone [address] [place] [address], [address] [place] [address] [phone] Your Updated Medication List  
  
   
This list is accurate as of 8/23/18 12:20 PM.  Always use your most recent med list.  
  
  
  
  
 * albuterol 2.5 mg /3 mL (0.083 %) nebulizer solution Commonly known as:  PROVENTIL VENTOLIN  
3 mL by Nebulization route every four (4) hours as needed for Wheezing. * albuterol 90 mcg/actuation inhaler Commonly known as:  PROVENTIL HFA, VENTOLIN HFA, PROAIR HFA Take 2 Puffs by inhalation every four (4) hours as needed for Wheezing. ARAVA 20 mg tablet Generic drug:  leflunomide Take 20 mg by mouth daily. aspirin 81 mg chewable tablet Take 1 Tab by mouth daily. atorvastatin 20 mg tablet Commonly known as:  LIPITOR Take 1 Tab by mouth nightly. baclofen 10 mg tablet Commonly known as:  LIORESAL Take  by mouth three (3) times daily. carvedilol 6.25 mg tablet Commonly known as:  Kathlean Due Take 1 Tab by mouth two (2) times daily (with meals). desloratadine 5 mg tablet Commonly known as:  CLARINEX Take 1 Tab by mouth daily as needed for Allergies. ferrous sulfate 325 mg (65 mg iron) tablet Take 1 Tab by mouth two (2) times a day. Indications: IRON DEFICIENCY ANEMIA  
  
 furosemide 40 mg tablet Commonly known as:  LASIX Take 0.5 Tabs by mouth daily. gabapentin 300 mg capsule Commonly known as:  NEURONTIN  
  
 insulin detemir U-100 100 unit/mL injection Commonly known as:  LEVEMIR U-100 INSULIN  
15 units daily for diabetes  
  
 mometasone 50 mcg/actuation nasal spray Commonly known as:  NASONEX  
2 Sprays by Both Nostrils route daily as needed. For allergies  
  
 raNITIdine 150 mg tablet Commonly known as:  ZANTAC Take 1 Tab by mouth two (2) times a day. sacubitril-valsartan 49-51 mg Tab tablet Commonly known as:  ENTRESTO Take 1 Tab by mouth two (2) times a day. ticagrelor 90 mg tablet Commonly known as:  [employer] Take 1 Tab by mouth two (2) times a day. umeclidinium-vilanterol 62.5-25 mcg/actuation inhaler Commonly known as:  Tori Nathalia Take 1 Puff by inhalation daily. For COPD * Notice: This list has 2 medication(s) that are the same as other medications prescribed for you. Read the directions carefully, and ask your doctor or other care provider to review them with you. We Performed the Following AMB POC EKG ROUTINE W/ 12 LEADS, INTER & REP [87603 CPT(R)] To-Do List   
 08/23/2018 Echocardiography:  ECHO ADULT FOLLOW-UP OR LIMITED   
  
 08/23/2018 Lab:  LIPID PANEL   
  
 08/23/2018 Lab:  METABOLIC PANEL, COMPREHENSIVE Introducing [facility]! José Miguel Brunner introduces [employer] patient portal. Now you can access parts of your medical record, email your doctor's office, and request medication refills online. 1. In your internet browser, go to [email or web address] 2. Click on the First Time User? Click Here link in the Sign In box.  You will see the New Member Sign Up page. 3. Enter your Conjecta Access Code exactly as it appears below. You will not need to use this code after youve completed the sign-up process. If you do not sign up before the expiration date, you must request a new code. · Conjecta Access Code: [email or web address] Expires: 11/18/2018  3:04 PM 
 
4. Enter the last four digits of your Social Security Number (xxxx) and Date of Birth (mm/dd/yyyy) as indicated and click Submit. You will be taken to the next sign-up page. 5. Create a Conjecta ID. This will be your Conjecta login ID and cannot be changed, so think of one that is secure and easy to remember. 6. Create a Conjecta password. You can change your password at any time. 7. Enter your Password Reset Question and Answer. This can be used at a later time if you forget your password. 8. Enter your e-mail address. You will receive e-mail notification when new information is available in [address] 9. Click Sign Up. You can now view and download portions of your medical record. 10. Click the Download Summary menu link to download a portable copy of your medical information. If you have questions, please visit the Frequently Asked Questions section of the Conjecta website. Remember, [employer]a is NOT to be used for urgent needs. For medical emergencies, dial 911. Now available from your iPhone and Android! Please provide this summary of care documentation to your next provider. Your primary care clinician is listed as Juan Carlos Franklin. If you have any questions after today's visit, please call [phone].

## 2018-08-23 NOTE — PROGRESS NOTES
HISTORY OF PRESENT ILLNESS  Doc Bonds is a 79 y.o. female. HPI     Patient presents for a follow-up office visit. She has an extensive cardiac history with a delayed presentation of an anterior wall myocardial infarction in May 2018. This was complicated by a cardiac arrest and also acute stent closure requiring repeat PCI to her proximal LAD with a total of 2 drug-eluting stents. Her ejection fraction was 35% with anterior wall hypokinesis on echocardiogram.  She did relatively well initially following her myocardial infarction and was discharged home. However, she was readmitted less than 2 weeks later for acute decompensated heart failure. She was last seen in our office 3 weeks ago by her nurse practitioner and has been doing fairly well since that time. She was started on Entresto which was titrated up to the middle dosing. She feels her shortness of breath has improved, though she still notices intermittent dyspnea with day-to-day activities, but as long as he slows down she is able to complete the mall. She has not had any recurrent chest pain episodes, no orthopnea, no PND, leg swelling. Was recently started on a CPAP machine ever weeks ago for obstructive sleep apnea. She admits to occasional dizziness when she stands up too quickly but no gary syncope or near syncope. No heart palpitations. Past Medical History:   Diagnosis Date    Abnormal WBC count 5/17/2016    Bilateral shoulder pain 9/27/2016    Chondromalacia of both patellae     COPD (chronic obstructive pulmonary disease) ([address] 9/2015    mild-mod dz; Dr Orville Hendrickson    Diabetes [facility]) 2013    Diabetic eye exam ([facility] [address] 2016    Dilated cardiomyopathy ([facility] [address]     Dyslipidemia     Heart attack ([facility] [address]     History of echocardiogram 11/14/2014    Mild LVE. EF 40%. Mild, diffuse hypk. Mild LAE.   Mild MR.      Hypercholesteremia 1/08/14    Hypertension 2000    Noncompliance with medications 2/16/2016    Obesity     Orthostatic hypotension 5/17/2016    Osteoarthritis of both knees     Pain management 04/01/2016    Dr. Rick Bullard     Popliteal cyst, bilateral      Vitamin D deficiency 10/16/14     Current Outpatient Prescriptions   Medication Sig Dispense Refill    furosemide (LASIX) 40 mg tablet Take 0.5 Tabs by mouth daily. 1 Tab 0    sacubitril-valsartan (ENTRESTO) 49 mg/51 mg tablet Take 1 Tab by mouth two (2) times a day. 60 Tab 4    baclofen (LIORESAL) 10 mg tablet Take  by mouth three (3) times daily.  gabapentin (NEURONTIN) 300 mg capsule       raNITIdine (ZANTAC) 150 mg tablet Take 1 Tab by mouth two (2) times a day. 60 Tab 4    carvedilol (COREG) 6.25 mg tablet Take 1 Tab by mouth two (2) times daily (with meals). 60 Tab 6    leflunomide (ARAVA) 20 mg tablet Take 20 mg by mouth daily.  ticagrelor (BRILINTA) 90 mg tablet Take 1 Tab by mouth two (2) times a day. 60 Tab 11    atorvastatin (LIPITOR) 20 mg tablet Take 1 Tab by mouth nightly. 30 Tab 0    aspirin 81 mg chewable tablet Take 1 Tab by mouth daily. 30 Tab 0    insulin detemir (LEVEMIR) 100 unit/mL injection 15 units daily for diabetes 2 Vial 0    albuterol (PROVENTIL HFA, VENTOLIN HFA, PROAIR HFA) 90 mcg/actuation inhaler Take 2 Puffs by inhalation every four (4) hours as needed for Wheezing. 1 Inhaler 3    desloratadine (CLARINEX) 5 mg tablet Take 1 Tab by mouth daily as needed for Allergies. 30 Tab 0    mometasone (NASONEX) 50 mcg/actuation nasal spray 2 Sprays by Both Nostrils route daily as needed. For allergies 3 Container 3    umeclidinium-vilanterol (ANORO ELLIPTA) 62.5-25 mcg/actuation inhaler Take 1 Puff by inhalation daily. For COPD 3 Inhaler 3    albuterol (PROVENTIL VENTOLIN) 2.5 mg /3 mL (0.083 %) nebulizer solution 3 mL by Nebulization route every four (4) hours as needed for Wheezing. 60 Each 3    ferrous sulfate 325 mg (65 mg iron) tablet Take 1 Tab by mouth two (2) times a day.  Indications: IRON DEFICIENCY ANEMIA 60 Tab 11     No Known Allergies     Social History   Substance Use Topics    Smoking status: Former Smoker     Packs/day: 0.25     Years: 48.00     Types: Cigarettes     Quit date: 5/20/2018    Smokeless tobacco: Never Used    Alcohol use No     Family History   Problem Relation Age of Onset    Diabetes Mother     Hypertension Mother     Hypertension Father     Kidney Disease Maternal Aunt 48     Dialysis           Review of Systems   Constitutional: Negative for chills, fever and weight loss. HENT: Negative for nosebleeds. Eyes: Negative for blurred vision and double vision. Respiratory: Positive for shortness of breath. Negative for cough and wheezing. Cardiovascular: Negative for chest pain, palpitations, orthopnea, claudication, leg swelling and PND. Gastrointestinal: Negative for abdominal pain, heartburn, nausea and vomiting. Genitourinary: Negative for dysuria and hematuria. Musculoskeletal: Negative for falls and myalgias. Skin: Negative for rash. Neurological: Positive for dizziness. Negative for focal weakness and headaches. Endo/Heme/Allergies: Does not bruise/bleed easily. Psychiatric/Behavioral: Negative for substance abuse. Visit Vitals    BP 98/64    Pulse 71    Ht 5' 4\" (1.626 m)    Wt 95.7 kg (211 lb)    SpO2 97%    BMI 36.22 kg/m2       Physical Exam   Constitutional: She is oriented to person, place, and time. She appears well-developed and well-nourished. HENT:   Head: Normocephalic and atraumatic. Eyes: Conjunctivae are normal.   Neck: Neck supple. No JVD present. Carotid bruit is not present. Cardiovascular: Normal rate, regular rhythm, S1 normal, S2 normal and normal pulses. Exam reveals no gallop. No murmur heard. Pulmonary/Chest: Effort normal and breath sounds normal. She has no wheezes. She has no rales. Abdominal: Soft. Bowel sounds are normal. There is no tenderness. Musculoskeletal: She exhibits no edema, tenderness or deformity. Neurological: She is alert and oriented to person, place, and time. Skin: Skin is warm and dry. Psychiatric: She has a normal mood and affect. Her behavior is normal. Thought content normal.     EKG: Normal sinus rhythm, normal axis, anteroseptal Q waves and high lateral Q waves consistent with prior infarct. Normal QTc interval.  No ST segment abnormalities concerning for ischemia. No change compared to the previous EKG from June 2018. ASSESSMENT and PLAN  Encounter Diagnoses   Name Primary?  Coronary artery disease involving native coronary artery of native heart without angina pectoris Yes    ST elevation myocardial infarction involving left anterior descending (LAD) coronary artery ([facility])     Dyslipidemia, goal LDL below 70     Essential hypertension     Ischemic cardiomyopathy     Type 2 diabetes with nephropathy ([facility])     Chronic systolic congestive heart failure, NYHA class 3 ([facility])      Coronary artery disease. History of anterior wall myocardial infarction in May 2018 status post 2 drug-eluting stents to the proximal LAD. Patient had mild to moderate nonobstructive coronary disease elsewhere. She remains on dual antiplatelet therapy. Ideally she should continue this for at least 9 additional months. If she does need a surgical procedure after 6 months of dual antiplatelet therapy that would be reasonable to briefly stop her Mylanta for a week. No recurrent chest pain since her MI. Ischemic cardiomyopathy. Ejection fraction 35% on echocardiogram in May 2018 at the time of her myocardial infarction. She has now been on optimal medical therapy for the past 3 months. I would like to reevaluate her LV function to see if this has improved. If her ejection fraction remains 35% or less, would consider implanting an AICD for primary prevention. She currently appears to be euvolemic on her current diuretic regimen. She remains on a beta-blocker and Entresto as well.   Aldactone may need to be added to her regimen if her ejection fraction remains low. Chronic systolic heart failure. Patient was admitted for an episode of decompensation several weeks after her initial infarct. She has done reasonably well over the past 2 months. She has NYHA class III symptomatology. She is now on a stable dosage of Lasix. I will continue all her medications. Essential hypertension. Patient blood pressure is lower limits of normal on her current medical regimen, which would continue. Dyslipidemia. Patient is currently taking atorvastatin 20 mg daily. I have recommended rechecking a fasting lipid panel to see if her LDL is less than 70 now. That is her goal.    Diabetes mellitus, type II. Insulin-dependent. This is followed by her PCP. Her hemoglobin A1c goal should be less than 7 from a cardiac standpoint. History of tobacco use. Patient quit smoking 3 months ago for which she was congratulated. She is encouraged to remain tobacco free.     Follow-up in 3 months, sooner if needed

## 2018-08-23 NOTE — PROGRESS NOTES
1. Have you been to the ER, urgent care clinic since your last visit? Hospitalized since your last visit? Yes, 5/20/18 - 5/23/18 for STEMI & 6/8 - 6/11 for CHF     2. Have you seen or consulted any other health care providers outside of the [facility] since your last visit? Include any pap smears or colon screening.  No

## 2018-09-05 ENCOUNTER — TELEPHONE (OUTPATIENT)
Dept: OBGYN CLINIC | Age: 68
End: 2018-09-05

## 2018-09-05 NOTE — TELEPHONE ENCOUNTER
Patient called and left a voicemail message asking to cancel the 21st appointment for biopsy. Patient states her cardiologist instructed her she can not have this procedure at this time. Dr. Rodriguez Pear to be made aware.

## 2018-09-12 NOTE — TELEPHONE ENCOUNTER
Called to clarify with pt, she states the cardiologist did not even want her to have an office bx, will clarify, refer to gyn-oncology if not bx able to be done

## 2018-09-13 DIAGNOSIS — N95.0 PMB (POSTMENOPAUSAL BLEEDING): Primary | ICD-10-CM

## 2018-09-20 ENCOUNTER — TELEPHONE (OUTPATIENT)
Dept: ONCOLOGY | Age: 68
End: 2018-09-20

## 2018-09-20 NOTE — TELEPHONE ENCOUNTER
Left voicemail for patient to call to schedule new patient appointment with Dr. Sosa Leal per referral from Dr. Mose Romberg.

## 2018-09-23 ENCOUNTER — HOSPITAL ENCOUNTER (EMERGENCY)
Age: 68
Discharge: HOME | End: 2018-09-23
Attending: EMERGENCY MEDICINE | Admitting: EMERGENCY MEDICINE
Payer: MEDICARE

## 2018-09-23 ENCOUNTER — APPOINTMENT (OUTPATIENT)
Dept: ULTRASOUND IMAGING | Age: 68
End: 2018-09-23
Attending: PHYSICIAN ASSISTANT
Payer: MEDICARE

## 2018-09-23 VITALS
SYSTOLIC BLOOD PRESSURE: 132 MMHG | DIASTOLIC BLOOD PRESSURE: 85 MMHG | HEART RATE: 86 BPM | RESPIRATION RATE: 15 BRPM | TEMPERATURE: 98.2 F | OXYGEN SATURATION: 100 %

## 2018-09-23 DIAGNOSIS — N93.9 VAGINAL BLEEDING: Primary | ICD-10-CM

## 2018-09-23 LAB
ALBUMIN SERPL-MCNC: 3.6 G/DL (ref 3.4–5)
ALBUMIN/GLOB SERPL: 1 {RATIO} (ref 0.8–1.7)
ALP SERPL-CCNC: 162 U/L (ref 45–117)
ALT SERPL-CCNC: 21 U/L (ref 13–56)
ANION GAP SERPL CALC-SCNC: 9 MMOL/L (ref 3–18)
APPEARANCE UR: CLEAR
APTT PPP: 30.2 SEC (ref 23–36.4)
AST SERPL-CCNC: 13 U/L (ref 15–37)
BACTERIA URNS QL MICRO: NEGATIVE /HPF
BASOPHILS # BLD: 0 K/UL (ref 0–0.1)
BASOPHILS NFR BLD: 0 % (ref 0–2)
BILIRUB SERPL-MCNC: 0.3 MG/DL (ref 0.2–1)
BILIRUB UR QL: NEGATIVE
BUN SERPL-MCNC: 38 MG/DL (ref 7–18)
BUN/CREAT SERPL: 19 (ref 12–20)
CALCIUM SERPL-MCNC: 8.1 MG/DL (ref 8.5–10.1)
CHLORIDE SERPL-SCNC: 112 MMOL/L (ref 100–108)
CO2 SERPL-SCNC: 20 MMOL/L (ref 21–32)
COLOR UR: YELLOW
CREAT SERPL-MCNC: 1.97 MG/DL (ref 0.6–1.3)
DIFFERENTIAL METHOD BLD: ABNORMAL
EOSINOPHIL # BLD: 0.1 K/UL (ref 0–0.4)
EOSINOPHIL NFR BLD: 2 % (ref 0–5)
EPITH CASTS URNS QL MICRO: NORMAL /LPF (ref 0–5)
ERYTHROCYTE [DISTWIDTH] IN BLOOD BY AUTOMATED COUNT: 16.1 % (ref 11.6–14.5)
GLOBULIN SER CALC-MCNC: 3.5 G/DL (ref 2–4)
GLUCOSE SERPL-MCNC: 100 MG/DL (ref 74–99)
GLUCOSE UR STRIP.AUTO-MCNC: NEGATIVE MG/DL
HCT VFR BLD AUTO: 32.1 % (ref 35–45)
HGB BLD-MCNC: 10.2 G/DL (ref 12–16)
HGB UR QL STRIP: ABNORMAL
HYALINE CASTS URNS QL MICRO: NORMAL /LPF (ref 0–2)
INR PPP: 1 (ref 0.8–1.2)
KETONES UR QL STRIP.AUTO: NEGATIVE MG/DL
LEUKOCYTE ESTERASE UR QL STRIP.AUTO: NEGATIVE
LYMPHOCYTES # BLD: 2.1 K/UL (ref 0.9–3.6)
LYMPHOCYTES NFR BLD: 41 % (ref 21–52)
MCH RBC QN AUTO: 26.6 PG (ref 24–34)
MCHC RBC AUTO-ENTMCNC: 31.8 G/DL (ref 31–37)
MCV RBC AUTO: 83.6 FL (ref 74–97)
MONOCYTES # BLD: 0.4 K/UL (ref 0.05–1.2)
MONOCYTES NFR BLD: 8 % (ref 3–10)
NEUTS SEG # BLD: 2.6 K/UL (ref 1.8–8)
NEUTS SEG NFR BLD: 49 % (ref 40–73)
NITRITE UR QL STRIP.AUTO: NEGATIVE
PH UR STRIP: 5 [PH] (ref 5–8)
PLATELET # BLD AUTO: 301 K/UL (ref 135–420)
PMV BLD AUTO: 10.1 FL (ref 9.2–11.8)
POTASSIUM SERPL-SCNC: 4.3 MMOL/L (ref 3.5–5.5)
PROT SERPL-MCNC: 7.1 G/DL (ref 6.4–8.2)
PROT UR STRIP-MCNC: NEGATIVE MG/DL
PROTHROMBIN TIME: 12.5 SEC (ref 11.5–15.2)
RBC # BLD AUTO: 3.84 M/UL (ref 4.2–5.3)
RBC #/AREA URNS HPF: NEGATIVE /HPF (ref 0–5)
SERVICE CMNT-IMP: NORMAL
SODIUM SERPL-SCNC: 141 MMOL/L (ref 136–145)
SP GR UR REFRACTOMETRY: 1.01 (ref 1–1.03)
UROBILINOGEN UR QL STRIP.AUTO: 0.2 EU/DL (ref 0.2–1)
WBC # BLD AUTO: 5.2 K/UL (ref 4.6–13.2)
WBC URNS QL MICRO: NEGATIVE /HPF (ref 0–4)
WET PREP GENITAL: NORMAL

## 2018-09-23 PROCEDURE — 76830 TRANSVAGINAL US NON-OB: CPT

## 2018-09-23 PROCEDURE — 85730 THROMBOPLASTIN TIME PARTIAL: CPT | Performed by: PHYSICIAN ASSISTANT

## 2018-09-23 PROCEDURE — 85610 PROTHROMBIN TIME: CPT | Performed by: PHYSICIAN ASSISTANT

## 2018-09-23 PROCEDURE — 99284 EMERGENCY DEPT VISIT MOD MDM: CPT

## 2018-09-23 PROCEDURE — 87210 SMEAR WET MOUNT SALINE/INK: CPT | Performed by: PHYSICIAN ASSISTANT

## 2018-09-23 PROCEDURE — 80053 COMPREHEN METABOLIC PANEL: CPT | Performed by: PHYSICIAN ASSISTANT

## 2018-09-23 PROCEDURE — 87491 CHLMYD TRACH DNA AMP PROBE: CPT | Performed by: PHYSICIAN ASSISTANT

## 2018-09-23 PROCEDURE — 85025 COMPLETE CBC W/AUTO DIFF WBC: CPT | Performed by: PHYSICIAN ASSISTANT

## 2018-09-23 PROCEDURE — 81001 URINALYSIS AUTO W/SCOPE: CPT | Performed by: PHYSICIAN ASSISTANT

## 2018-09-23 NOTE — ED PROVIDER NOTES
EMERGENCY DEPARTMENT HISTORY AND PHYSICAL EXAM    Date: 9/23/2018  Patient Name: Linda Madden    History of Presenting Illness     Chief Complaint   Patient presents with    Vaginal Discharge         History Provided By:patient     Chief Complaint: vaginal bleeding   Duration: 1 week   Timing:  Acute, intermittent  Location: pelvic   Quality:mild cramping   Severity: moderate  Modifying Factors: none   Associated Symptoms:none       Additional History (Context): Linda Madden is a 79 y.o. female with PMH dyslipidemia, COPD, hypercholesterolemia, obesity, dilated cardiomyopathy, vitamin D deficiency, DM, and MI who presents with complaints of intermittent vaginal bleeding x a few days. Pt states she had a heart attack in May and was placed on Brillinta. She states in June she had a similar episode of vaginal bleeding which was attributed to the [place] she was started on after the heart attack. Pt states her cardiologist did not want to stop this medication so she is currently still taking it. She states the bleeding stopped for a few months but has started again, intermittently. She has not other complaints at present. PCP: Rico Etienne MD    Current Outpatient Prescriptions   Medication Sig Dispense Refill    furosemide (LASIX) 40 mg tablet Take 0.5 Tabs by mouth daily. 1 Tab 0    sacubitril-valsartan (ENTRESTO) 49 mg/51 mg tablet Take 1 Tab by mouth two (2) times a day. 60 Tab 4    baclofen (LIORESAL) 10 mg tablet Take  by mouth three (3) times daily.  gabapentin (NEURONTIN) 300 mg capsule       raNITIdine (ZANTAC) 150 mg tablet Take 1 Tab by mouth two (2) times a day. 60 Tab 4    carvedilol (COREG) 6.25 mg tablet Take 1 Tab by mouth two (2) times daily (with meals). 60 Tab 6    leflunomide (ARAVA) 20 mg tablet Take 20 mg by mouth daily.  ticagrelor (BRILINTA) 90 mg tablet Take 1 Tab by mouth two (2) times a day.  60 Tab 11    atorvastatin (LIPITOR) 20 mg tablet Take 1 Tab by mouth nightly. 30 Tab 0    aspirin 81 mg chewable tablet Take 1 Tab by mouth daily. 30 Tab 0    insulin detemir (LEVEMIR) 100 unit/mL injection 15 units daily for diabetes 2 Vial 0    albuterol (PROVENTIL HFA, VENTOLIN HFA, PROAIR HFA) 90 mcg/actuation inhaler Take 2 Puffs by inhalation every four (4) hours as needed for Wheezing. 1 Inhaler 3    desloratadine (CLARINEX) 5 mg tablet Take 1 Tab by mouth daily as needed for Allergies. 30 Tab 0    mometasone (NASONEX) 50 mcg/actuation nasal spray 2 Sprays by Both Nostrils route daily as needed. For allergies 3 Container 3    umeclidinium-vilanterol (ANORO ELLIPTA) 62.5-25 mcg/actuation inhaler Take 1 Puff by inhalation daily. For COPD 3 Inhaler 3    albuterol (PROVENTIL VENTOLIN) 2.5 mg /3 mL (0.083 %) nebulizer solution 3 mL by Nebulization route every four (4) hours as needed for Wheezing. 60 Each 3    ferrous sulfate 325 mg (65 mg iron) tablet Take 1 Tab by mouth two (2) times a day. Indications: IRON DEFICIENCY ANEMIA 60 Tab 11       Past History     Past Medical History:  Past Medical History:   Diagnosis Date    Abnormal WBC count 5/17/2016    Bilateral shoulder pain 9/27/2016    Chondromalacia of both patellae     COPD (chronic obstructive pulmonary disease) [facility] [address] 9/2015    mild-mod dz; Dr Brock Lindo    Diabetes [facility]) 2013    Diabetic eye exam ([facility] [address] 2016    Dilated cardiomyopathy ([facility] [address]     Dyslipidemia     Heart attack ([facility] [address]     History of echocardiogram 11/14/2014    Mild LVE. EF 40%. Mild, diffuse hypk. Mild LAE.   Mild MR.      Hypercholesteremia 1/08/14    Hypertension 2000    Noncompliance with medications 2/16/2016    Obesity     Orthostatic hypotension 5/17/2016    Osteoarthritis of both knees     Pain management 04/01/2016    Dr. Gladys Trevino Popliteal cyst, bilateral      Vitamin D deficiency 10/16/14       Past Surgical History:  Past Surgical History:   Procedure Laterality Date    HX HEART CATHETERIZATION      HX TUBAL LIGATION         Family History:  Family History   Problem Relation Age of Onset    Diabetes Mother     Hypertension Mother     Hypertension Father     Kidney Disease Maternal Aunt 48     Dialysis       Social History:  Social History   Substance Use Topics    Smoking status: Former Smoker     Packs/day: 0.25     Years: 48.00     Types: Cigarettes     Quit date: 5/20/2018    Smokeless tobacco: Never Used    Alcohol use No       Allergies:  No Known Allergies      Review of Systems   Review of Systems   Constitutional: Negative. Negative for chills and fever. HENT: Negative. Negative for congestion, ear pain and rhinorrhea. Eyes: Negative. Negative for pain and redness. Respiratory: Negative. Negative for cough, shortness of breath, wheezing and stridor. Cardiovascular: Negative. Negative for chest pain and leg swelling. Gastrointestinal: Negative. Negative for abdominal pain, constipation, diarrhea, nausea and vomiting. Genitourinary: Positive for vaginal bleeding. Negative for dysuria and frequency. Musculoskeletal: Negative. Negative for back pain and neck pain. Skin: Negative. Negative for rash and wound. Neurological: Negative. Negative for dizziness, seizures, syncope and headaches. All other systems reviewed and are negative. All Other Systems Negative  Physical Exam     Vitals:    09/23/18 1620   BP: 132/85   Pulse: 86   Resp: 15   Temp: 98.2 °F (36.8 °C)   SpO2: 100%     Physical Exam   Constitutional: She is oriented to person, place, and time. She appears well-developed and well-nourished. No distress. HENT:   Head: Normocephalic and atraumatic. Eyes: Conjunctivae are normal. Right eye exhibits no discharge. Left eye exhibits no discharge. No scleral icterus. Neck: Normal range of motion. Neck supple. Cardiovascular: Normal rate, regular rhythm and normal heart sounds. Exam reveals no gallop and no friction rub. No murmur heard.   Pulmonary/Chest: Effort normal and breath sounds normal. No stridor. No respiratory distress. She has no wheezes. She has no rales. Genitourinary:   Genitourinary Comments: Scant amount of brown bloody discharge noted on speculum exam, no mass or adnexal pain noted. Musculoskeletal: Normal range of motion. Neurological: She is alert and oriented to person, place, and time. Coordination normal.   Gait is steady. Able to ambulate without difficulty. Skin: Skin is warm and dry. No rash noted. She is not diaphoretic. No erythema. Psychiatric: She has a normal mood and affect. Her behavior is normal. Thought content normal.   Nursing note and vitals reviewed.              Diagnostic Study Results     Labs -     Recent Results (from the past 12 hour(s))   URINALYSIS W/ RFLX MICROSCOPIC    Collection Time: 09/23/18  4:01 PM   Result Value Ref Range    Color YELLOW      Appearance CLEAR      Specific gravity 1.012 1.005 - 1.030      pH (UA) 5.0 5.0 - 8.0      Protein NEGATIVE  NEG mg/dL    Glucose NEGATIVE  NEG mg/dL    Ketone NEGATIVE  NEG mg/dL    Bilirubin NEGATIVE  NEG      Blood SMALL (A) NEG      Urobilinogen 0.2 0.2 - 1.0 EU/dL    Nitrites NEGATIVE  NEG      Leukocyte Esterase NEGATIVE  NEG     URINE MICROSCOPIC ONLY    Collection Time: 09/23/18  4:01 PM   Result Value Ref Range    WBC NEGATIVE  0 - 4 /hpf    RBC NEGATIVE  0 - 5 /hpf    Epithelial cells 1+ 0 - 5 /lpf    Bacteria NEGATIVE  NEG /hpf    Hyaline cast 0 to 2 0 - 2 /lpf   CBC WITH AUTOMATED DIFF    Collection Time: 09/23/18  4:35 PM   Result Value Ref Range    WBC 5.2 4.6 - 13.2 K/uL    RBC 3.84 (L) 4.20 - 5.30 M/uL    HGB 10.2 (L) 12.0 - 16.0 g/dL    HCT 32.1 (L) 35.0 - 45.0 %    MCV 83.6 74.0 - 97.0 FL    MCH 26.6 24.0 - 34.0 PG    MCHC 31.8 31.0 - 37.0 g/dL    RDW 16.1 (H) 11.6 - 14.5 %    PLATELET 413 136 - 890 K/uL    MPV 10.1 9.2 - 11.8 FL    NEUTROPHILS 49 40 - 73 %    LYMPHOCYTES 41 21 - 52 %    MONOCYTES 8 3 - 10 %    EOSINOPHILS 2 0 - 5 %    BASOPHILS 0 0 - 2 %    ABS. NEUTROPHILS 2.6 1.8 - 8.0 K/UL    ABS. LYMPHOCYTES 2.1 0.9 - 3.6 K/UL    ABS. MONOCYTES 0.4 0.05 - 1.2 K/UL    ABS. EOSINOPHILS 0.1 0.0 - 0.4 K/UL    ABS. BASOPHILS 0.0 0.0 - 0.1 K/UL    DF AUTOMATED     METABOLIC PANEL, COMPREHENSIVE    Collection Time: 09/23/18  4:35 PM   Result Value Ref Range    Sodium 141 136 - 145 mmol/L    Potassium 4.3 3.5 - 5.5 mmol/L    Chloride 112 (H) 100 - 108 mmol/L    CO2 20 (L) 21 - 32 mmol/L    Anion gap 9 3.0 - 18 mmol/L    Glucose 100 (H) 74 - 99 mg/dL    BUN 38 (H) 7.0 - 18 MG/DL    Creatinine 1.97 (H) 0.6 - 1.3 MG/DL    BUN/Creatinine ratio 19 12 - 20      GFR est AA 31 (L) >60 ml/min/1.73m2    GFR est non-AA 25 (L) >60 ml/min/1.73m2    Calcium 8.1 (L) 8.5 - 10.1 MG/DL    Bilirubin, total 0.3 0.2 - 1.0 MG/DL    ALT (SGPT) 21 13 - 56 U/L    AST (SGOT) 13 (L) 15 - 37 U/L    Alk. phosphatase 162 (H) 45 - 117 U/L    Protein, total 7.1 6.4 - 8.2 g/dL    Albumin 3.6 3.4 - 5.0 g/dL    Globulin 3.5 2.0 - 4.0 g/dL    A-G Ratio 1.0 0.8 - 1.7     PROTHROMBIN TIME + INR    Collection Time: 09/23/18  4:35 PM   Result Value Ref Range    Prothrombin time 12.5 11.5 - 15.2 sec    INR 1.0 0.8 - 1.2     PTT    Collection Time: 09/23/18  4:35 PM   Result Value Ref Range    aPTT 30.2 23.0 - 36.4 SEC   WET PREP    Collection Time: 09/23/18  5:17 PM   Result Value Ref Range    Special Requests: NO SPECIAL REQUESTS      Wet prep NO YEAST,TRICHOMONAS OR CLUE CELLS NOTED         Radiologic Studies -   US TRANSVAGINAL   Final Result        CT Results  (Last 48 hours)    None        CXR Results  (Last 48 hours)    None            Medical Decision Making   I am the first provider for this patient. I reviewed the vital signs, available nursing notes, past medical history, past surgical history, family history and social history. Vital Signs-Reviewed the patient's vital signs.       Records Reviewed: Mayela Restrepo PA-C 7:23 PM     Procedures:  Procedures    Provider Notes (Medical Decision Making): Impression:  Post menopausal vaginal bleeding     MED RECONCILIATION:  No current facility-administered medications for this encounter. Current Outpatient Prescriptions   Medication Sig    furosemide (LASIX) 40 mg tablet Take 0.5 Tabs by mouth daily.  sacubitril-valsartan (ENTRESTO) 49 mg/51 mg tablet Take 1 Tab by mouth two (2) times a day.  baclofen (LIORESAL) 10 mg tablet Take  by mouth three (3) times daily.  gabapentin (NEURONTIN) 300 mg capsule     raNITIdine (ZANTAC) 150 mg tablet Take 1 Tab by mouth two (2) times a day.  carvedilol (COREG) 6.25 mg tablet Take 1 Tab by mouth two (2) times daily (with meals).  leflunomide (ARAVA) 20 mg tablet Take 20 mg by mouth daily.  ticagrelor (BRILINTA) 90 mg tablet Take 1 Tab by mouth two (2) times a day.  atorvastatin (LIPITOR) 20 mg tablet Take 1 Tab by mouth nightly.  aspirin 81 mg chewable tablet Take 1 Tab by mouth daily.  insulin detemir (LEVEMIR) 100 unit/mL injection 15 units daily for diabetes    albuterol (PROVENTIL HFA, VENTOLIN HFA, PROAIR HFA) 90 mcg/actuation inhaler Take 2 Puffs by inhalation every four (4) hours as needed for Wheezing.  desloratadine (CLARINEX) 5 mg tablet Take 1 Tab by mouth daily as needed for Allergies.  mometasone (NASONEX) 50 mcg/actuation nasal spray 2 Sprays by Both Nostrils route daily as needed. For allergies    umeclidinium-vilanterol (ANORO ELLIPTA) 62.5-25 mcg/actuation inhaler Take 1 Puff by inhalation daily. For COPD    albuterol (PROVENTIL VENTOLIN) 2.5 mg /3 mL (0.083 %) nebulizer solution 3 mL by Nebulization route every four (4) hours as needed for Wheezing.  ferrous sulfate 325 mg (65 mg iron) tablet Take 1 Tab by mouth two (2) times a day. Indications: IRON DEFICIENCY ANEMIA       Disposition:  D/c    DISCHARGE NOTE:     Pt has been reexamined. Patient has no new complaints, changes, or physical findings. Care plan outlined and precautions discussed.   Results of the labs and imaging were reviewed with the patient. All medications were reviewed with the patient; will d/c home with no new rx. All of pt's questions and concerns were addressed. Patient was instructed and agrees to follow up with OBGYN, as well as to return to the ED upon further deterioration. Patient is ready to go home.  Mayela Restrepo PA-C 8:41 PM           Diagnosis     Clinical Impression: post menopausal vaginal bleeding

## 2018-09-24 LAB
C TRACH RRNA SPEC QL NAA+PROBE: NEGATIVE
N GONORRHOEA RRNA SPEC QL NAA+PROBE: NEGATIVE
SPECIMEN SOURCE: NORMAL

## 2018-09-24 NOTE — DISCHARGE INSTRUCTIONS
Abnormal Uterine Bleeding: Care Instructions  Your Care Instructions    Abnormal uterine bleeding (AUB) is irregular bleeding from the uterus that is longer or heavier than usual or does not occur at your regular time. Sometimes it is caused by changes in hormone levels. It can also be caused by growths in the uterus, such as fibroids or polyps. Sometimes a cause cannot be found. You may have heavy bleeding when you are not expecting your period. Your doctor may suggest a pregnancy test, if you think you are pregnant. Follow-up care is a key part of your treatment and safety. Be sure to make and go to all appointments, and call your doctor if you are having problems. It's also a good idea to know your test results and keep a list of the medicines you take. How can you care for yourself at home? · Be safe with medicines. Take pain medicines exactly as directed. ¨ If the doctor gave you a prescription medicine for pain, take it as prescribed. ¨ If you are not taking a prescription pain medicine, ask your doctor if you can take an over-the-counter medicine. · You may be low in iron because of blood loss. Eat a balanced diet that is high in iron and vitamin C. Foods rich in iron include red meat, shellfish, eggs, beans, and leafy green vegetables. Talk to your doctor about whether you need to take iron pills or a multivitamin. When should you call for help? Call 911 anytime you think you may need emergency care. For example, call if:    · You passed out (lost consciousness).    Call your doctor now or seek immediate medical care if:    · You have new or worse belly or pelvic pain.     · You have severe vaginal bleeding.     · You feel dizzy or lightheaded, or you feel like you may faint.    Watch closely for changes in your health, and be sure to contact your doctor if:    · You think you may be pregnant.     · Your bleeding gets worse.     · You do not get better as expected.    Where can you learn more?  Go to http://jason-charity.info/. Enter Z852 in the search box to learn more about \"Abnormal Uterine Bleeding: Care Instructions. \"  Current as of: 2017  Content Version: 11.7  © 7591-6530 [employer]. Care instructions adapted under license by [employer] (which disclaims liability or warranty for this information). If you have questions about a medical condition or this instruction, always ask your healthcare professional. [address] any warranty or liability for your use of this information. 3Jam Activation    Thank you for requesting access to 3Jam. Please follow the instructions below to securely access and download your online medical record. 3Jam allows you to send messages to your doctor, view your test results, renew your prescriptions, schedule appointments, and more. How Do I Sign Up? 1. In your internet browser, go to [email or web address]  2. Click on the First Time User? Click Here link in the Sign In box. You will be redirect to the New Member Sign Up page. 3. Enter your 3Jam Access Code exactly as it appears below. You will not need to use this code after youve completed the sign-up process. If you do not sign up before the expiration date, you must request a new code. 3Jam Access Code: [email or web address]  Expires: 2018  3:04 PM (This is the date your 3Jam access code will )    4. Enter the last four digits of your Social Security Number (xxxx) and Date of Birth (mm/dd/yyyy) as indicated and click Submit. You will be taken to the next sign-up page. 5. Create a 3Jam ID. This will be your 3Jam login ID and cannot be changed, so think of one that is secure and easy to remember. 6. Create a 3Jam password. You can change your password at any time. 7. Enter your Password Reset Question and Answer. This can be used at a later time if you forget your password.    8. Enter your e-mail address. You will receive e-mail notification when new information is available in [address] 9. Click Sign Up. You can now view and download portions of your medical record. 10. Click the Download Summary menu link to download a portable copy of your medical information. Additional Information    If you have questions, please visit the Frequently Asked Questions section of the [employer] website at [email or web address]. [email or web address]. [email or web address]-[email or web address] Remember, [employer] is NOT to be used for urgent needs. For medical emergencies, dial 911.

## 2018-10-05 ENCOUNTER — TELEPHONE (OUTPATIENT)
Dept: ONCOLOGY | Age: 68
End: 2018-10-05

## 2018-10-05 NOTE — TELEPHONE ENCOUNTER
Left voicemail for patient to call the office to schedule new patient appointment with Dr. Moose Reyes per referral from Dr. Lindsay Seaman.

## 2018-10-12 ENCOUNTER — TELEPHONE (OUTPATIENT)
Dept: ONCOLOGY | Age: 68
End: 2018-10-12

## 2018-10-12 NOTE — TELEPHONE ENCOUNTER
Left third voicemail for patient to call to schedule new patient appointment with Dr. Karol Atkins per referral from Dr. Barbara Smart.

## 2019-01-21 ENCOUNTER — APPOINTMENT (OUTPATIENT)
Dept: GENERAL RADIOLOGY | Age: 69
End: 2019-01-21
Attending: PHYSICIAN ASSISTANT
Payer: MEDICARE

## 2019-01-21 ENCOUNTER — HOSPITAL ENCOUNTER (EMERGENCY)
Age: 69
Discharge: HOME | End: 2019-01-21
Attending: EMERGENCY MEDICINE
Payer: MEDICARE

## 2019-01-21 VITALS
TEMPERATURE: 98.1 F | HEART RATE: 74 BPM | OXYGEN SATURATION: 99 % | WEIGHT: 220 LBS | SYSTOLIC BLOOD PRESSURE: 130 MMHG | DIASTOLIC BLOOD PRESSURE: 60 MMHG | BODY MASS INDEX: 37.76 KG/M2 | RESPIRATION RATE: 18 BRPM

## 2019-01-21 DIAGNOSIS — R07.9 CHEST PAIN, UNSPECIFIED TYPE: Primary | ICD-10-CM

## 2019-01-21 LAB
ANION GAP SERPL CALC-SCNC: 10 MMOL/L (ref 3–18)
BASOPHILS # BLD: 0 K/UL (ref 0–0.1)
BASOPHILS NFR BLD: 0 % (ref 0–2)
BUN SERPL-MCNC: 36 MG/DL (ref 7–18)
BUN/CREAT SERPL: 15 (ref 12–20)
CALCIUM SERPL-MCNC: 8.5 MG/DL (ref 8.5–10.1)
CHLORIDE SERPL-SCNC: 108 MMOL/L (ref 100–108)
CK MB CFR SERPL CALC: NORMAL % (ref 0–4)
CK MB CFR SERPL CALC: NORMAL % (ref 0–4)
CK MB SERPL-MCNC: <1 NG/ML (ref 5–25)
CK MB SERPL-MCNC: <1 NG/ML (ref 5–25)
CK SERPL-CCNC: 126 U/L (ref 26–192)
CK SERPL-CCNC: 131 U/L (ref 26–192)
CO2 SERPL-SCNC: 24 MMOL/L (ref 21–32)
CREAT SERPL-MCNC: 2.39 MG/DL (ref 0.6–1.3)
DIFFERENTIAL METHOD BLD: ABNORMAL
EOSINOPHIL # BLD: 0.2 K/UL (ref 0–0.4)
EOSINOPHIL NFR BLD: 3 % (ref 0–5)
ERYTHROCYTE [DISTWIDTH] IN BLOOD BY AUTOMATED COUNT: 16.3 % (ref 11.6–14.5)
GLUCOSE SERPL-MCNC: 161 MG/DL (ref 74–99)
HCT VFR BLD AUTO: 33.5 % (ref 35–45)
HGB BLD-MCNC: 10.7 G/DL (ref 12–16)
LYMPHOCYTES # BLD: 2.1 K/UL (ref 0.9–3.6)
LYMPHOCYTES NFR BLD: 39 % (ref 21–52)
MCH RBC QN AUTO: 27.7 PG (ref 24–34)
MCHC RBC AUTO-ENTMCNC: 31.9 G/DL (ref 31–37)
MCV RBC AUTO: 86.8 FL (ref 74–97)
MONOCYTES # BLD: 0.4 K/UL (ref 0.05–1.2)
MONOCYTES NFR BLD: 8 % (ref 3–10)
NEUTS SEG # BLD: 2.7 K/UL (ref 1.8–8)
NEUTS SEG NFR BLD: 50 % (ref 40–73)
PLATELET # BLD AUTO: 360 K/UL (ref 135–420)
PMV BLD AUTO: 9.5 FL (ref 9.2–11.8)
POTASSIUM SERPL-SCNC: 3.9 MMOL/L (ref 3.5–5.5)
RBC # BLD AUTO: 3.86 M/UL (ref 4.2–5.3)
SODIUM SERPL-SCNC: 142 MMOL/L (ref 136–145)
TROPONIN I SERPL-MCNC: <0.02 NG/ML (ref 0–0.04)
TROPONIN I SERPL-MCNC: <0.02 NG/ML (ref 0–0.04)
WBC # BLD AUTO: 5.4 K/UL (ref 4.6–13.2)

## 2019-01-21 PROCEDURE — 80048 BASIC METABOLIC PNL TOTAL CA: CPT

## 2019-01-21 PROCEDURE — 74011250636 HC RX REV CODE- 250/636: Performed by: EMERGENCY MEDICINE

## 2019-01-21 PROCEDURE — 93005 ELECTROCARDIOGRAM TRACING: CPT

## 2019-01-21 PROCEDURE — 82550 ASSAY OF CK (CPK): CPT

## 2019-01-21 PROCEDURE — 96375 TX/PRO/DX INJ NEW DRUG ADDON: CPT

## 2019-01-21 PROCEDURE — 74011250637 HC RX REV CODE- 250/637: Performed by: EMERGENCY MEDICINE

## 2019-01-21 PROCEDURE — 71045 X-RAY EXAM CHEST 1 VIEW: CPT

## 2019-01-21 PROCEDURE — 99284 EMERGENCY DEPT VISIT MOD MDM: CPT

## 2019-01-21 PROCEDURE — 96374 THER/PROPH/DIAG INJ IV PUSH: CPT

## 2019-01-21 PROCEDURE — 85025 COMPLETE CBC W/AUTO DIFF WBC: CPT

## 2019-01-21 RX ORDER — GUAIFENESIN 100 MG/5ML
162 LIQUID (ML) ORAL
Status: COMPLETED | OUTPATIENT
Start: 2019-01-21 | End: 2019-01-21

## 2019-01-21 RX ORDER — HYDROCODONE BITARTRATE AND ACETAMINOPHEN 5; 325 MG/1; MG/1
TABLET ORAL
Qty: 5 TAB | Refills: 0 | Status: SHIPPED | OUTPATIENT
Start: 2019-01-21 | End: 2019-07-19

## 2019-01-21 RX ORDER — MORPHINE SULFATE 2 MG/ML
2 INJECTION, SOLUTION INTRAMUSCULAR; INTRAVENOUS
Status: COMPLETED | OUTPATIENT
Start: 2019-01-21 | End: 2019-01-21

## 2019-01-21 RX ORDER — ONDANSETRON 2 MG/ML
4 INJECTION INTRAMUSCULAR; INTRAVENOUS
Status: COMPLETED | OUTPATIENT
Start: 2019-01-21 | End: 2019-01-21

## 2019-01-21 RX ADMIN — ASPIRIN 81 MG CHEWABLE TABLET 162 MG: 81 TABLET CHEWABLE at 18:27

## 2019-01-21 RX ADMIN — ONDANSETRON 4 MG: 2 INJECTION INTRAMUSCULAR; INTRAVENOUS at 19:08

## 2019-01-21 RX ADMIN — MORPHINE SULFATE 2 MG: 2 INJECTION, SOLUTION INTRAMUSCULAR; INTRAVENOUS at 19:08

## 2019-01-21 NOTE — ED TRIAGE NOTES
C/o chest pain, and states Hx of heart attack and reports last time I had pain in my chest I had a heart attack in May\" states symptoms started at 1300 in the afternoon HX of Heart attack with stent placement

## 2019-01-22 NOTE — DISCHARGE INSTRUCTIONS
Patient Education     Take your prescribed medication as directed. Follow up with your primary care physician. Return to the emergency room with any new or worsening conditions. Chest Pain: Care Instructions  Your Care Instructions    There are many things that can cause chest pain. Some are not serious and will get better on their own in a few days. But some kinds of chest pain need more testing and treatment. Your doctor may have recommended a follow-up visit in the next 8 to 12 hours. If you are not getting better, you may need more tests or treatment. Even though your doctor has released you, you still need to watch for any problems. The doctor carefully checked you, but sometimes problems can develop later. If you have new symptoms or if your symptoms do not get better, get medical care right away. If you have worse or different chest pain or pressure that lasts more than 5 minutes or you passed out (lost consciousness), call 911 or seek other emergency help right away. A medical visit is only one step in your treatment. Even if you feel better, you still need to do what your doctor recommends, such as going to all suggested follow-up appointments and taking medicines exactly as directed. This will help you recover and help prevent future problems. How can you care for yourself at home? · Rest until you feel better. · Take your medicine exactly as prescribed. Call your doctor if you think you are having a problem with your medicine. · Do not drive after taking a prescription pain medicine. When should you call for help? Call 911 if:    · You passed out (lost consciousness).     · You have severe difficulty breathing.     · You have symptoms of a heart attack. These may include:  ? Chest pain or pressure, or a strange feeling in your chest.  ? Sweating. ? Shortness of breath. ? Nausea or vomiting.   ? Pain, pressure, or a strange feeling in your back, neck, jaw, or upper belly or in one or both shoulders or arms. ? Lightheadedness or sudden weakness. ? A fast or irregular heartbeat. After you call 911, the  may tell you to chew 1 adult-strength or 2 to 4 low-dose aspirin. Wait for an ambulance. Do not try to drive yourself.    Call your doctor today if:    · You have any trouble breathing.     · Your chest pain gets worse.     · You are dizzy or lightheaded, or you feel like you may faint.     · You are not getting better as expected.     · You are having new or different chest pain. Where can you learn more? Go to http://jason-charity.info/. Enter A120 in the search box to learn more about \"Chest Pain: Care Instructions. \"  Current as of: September 23, 2018  Content Version: 11.9  © 7978-0960 [employer]. Care instructions adapted under license by [employer] (which disclaims liability or warranty for this information). If you have questions about a medical condition or this instruction, always ask your healthcare professional. [address] any warranty or liability for your use of this information.

## 2019-01-23 LAB
ATRIAL RATE: 98 BPM
CALCULATED P AXIS, ECG09: 60 DEGREES
CALCULATED R AXIS, ECG10: 35 DEGREES
CALCULATED T AXIS, ECG11: 63 DEGREES
DIAGNOSIS, 93000: NORMAL
P-R INTERVAL, ECG05: 134 MS
Q-T INTERVAL, ECG07: 368 MS
QRS DURATION, ECG06: 68 MS
QTC CALCULATION (BEZET), ECG08: 469 MS
VENTRICULAR RATE, ECG03: 98 BPM

## 2019-05-03 ENCOUNTER — APPOINTMENT (OUTPATIENT)
Dept: GENERAL RADIOLOGY | Age: 69
End: 2019-05-03
Attending: EMERGENCY MEDICINE
Payer: MEDICARE

## 2019-05-03 ENCOUNTER — HOSPITAL ENCOUNTER (EMERGENCY)
Age: 69
Discharge: HOME | End: 2019-05-04
Attending: EMERGENCY MEDICINE
Payer: MEDICARE

## 2019-05-03 ENCOUNTER — APPOINTMENT (OUTPATIENT)
Dept: CT IMAGING | Age: 69
End: 2019-05-03
Attending: EMERGENCY MEDICINE
Payer: MEDICARE

## 2019-05-03 DIAGNOSIS — M79.605 ACUTE LEG PAIN, LEFT: Primary | ICD-10-CM

## 2019-05-03 DIAGNOSIS — M25.572 ACUTE LEFT ANKLE PAIN: ICD-10-CM

## 2019-05-03 DIAGNOSIS — I50.22 CHRONIC SYSTOLIC CONGESTIVE HEART FAILURE (HCC): ICD-10-CM

## 2019-05-03 LAB
ALBUMIN SERPL-MCNC: 3.6 G/DL (ref 3.4–5)
ALBUMIN/GLOB SERPL: 1 {RATIO} (ref 0.8–1.7)
ALP SERPL-CCNC: 142 U/L (ref 45–117)
ALT SERPL-CCNC: 16 U/L (ref 13–56)
ANION GAP SERPL CALC-SCNC: 6 MMOL/L (ref 3–18)
AST SERPL-CCNC: 10 U/L (ref 15–37)
BASOPHILS # BLD: 0 K/UL (ref 0–0.1)
BASOPHILS NFR BLD: 0 % (ref 0–2)
BILIRUB SERPL-MCNC: 0.4 MG/DL (ref 0.2–1)
BNP SERPL-MCNC: 889 PG/ML (ref 0–900)
BUN SERPL-MCNC: 32 MG/DL (ref 7–18)
BUN/CREAT SERPL: 18 (ref 12–20)
CALCIUM SERPL-MCNC: 8.9 MG/DL (ref 8.5–10.1)
CHLORIDE SERPL-SCNC: 114 MMOL/L (ref 100–108)
CO2 SERPL-SCNC: 22 MMOL/L (ref 21–32)
CREAT SERPL-MCNC: 1.75 MG/DL (ref 0.6–1.3)
DIFFERENTIAL METHOD BLD: ABNORMAL
EOSINOPHIL # BLD: 0.1 K/UL (ref 0–0.4)
EOSINOPHIL NFR BLD: 2 % (ref 0–5)
ERYTHROCYTE [DISTWIDTH] IN BLOOD BY AUTOMATED COUNT: 17.4 % (ref 11.6–14.5)
GLOBULIN SER CALC-MCNC: 3.7 G/DL (ref 2–4)
GLUCOSE SERPL-MCNC: 138 MG/DL (ref 74–99)
HCT VFR BLD AUTO: 30.4 % (ref 35–45)
HGB BLD-MCNC: 9.7 G/DL (ref 12–16)
LYMPHOCYTES # BLD: 1.9 K/UL (ref 0.9–3.6)
LYMPHOCYTES NFR BLD: 25 % (ref 21–52)
MCH RBC QN AUTO: 28.2 PG (ref 24–34)
MCHC RBC AUTO-ENTMCNC: 31.9 G/DL (ref 31–37)
MCV RBC AUTO: 88.4 FL (ref 74–97)
MONOCYTES # BLD: 1 K/UL (ref 0.05–1.2)
MONOCYTES NFR BLD: 13 % (ref 3–10)
NEUTS SEG # BLD: 4.9 K/UL (ref 1.8–8)
NEUTS SEG NFR BLD: 60 % (ref 40–73)
PLATELET # BLD AUTO: 352 K/UL (ref 135–420)
PMV BLD AUTO: 9 FL (ref 9.2–11.8)
POTASSIUM SERPL-SCNC: 4.4 MMOL/L (ref 3.5–5.5)
PROT SERPL-MCNC: 7.3 G/DL (ref 6.4–8.2)
RBC # BLD AUTO: 3.44 M/UL (ref 4.2–5.3)
SODIUM SERPL-SCNC: 142 MMOL/L (ref 136–145)
TROPONIN I SERPL-MCNC: <0.02 NG/ML (ref 0–0.04)
WBC # BLD AUTO: 7.9 K/UL (ref 4.6–13.2)

## 2019-05-03 PROCEDURE — 83880 ASSAY OF NATRIURETIC PEPTIDE: CPT

## 2019-05-03 PROCEDURE — 96374 THER/PROPH/DIAG INJ IV PUSH: CPT

## 2019-05-03 PROCEDURE — 93005 ELECTROCARDIOGRAM TRACING: CPT

## 2019-05-03 PROCEDURE — 74011250636 HC RX REV CODE- 250/636: Performed by: EMERGENCY MEDICINE

## 2019-05-03 PROCEDURE — 85025 COMPLETE CBC W/AUTO DIFF WBC: CPT

## 2019-05-03 PROCEDURE — 71046 X-RAY EXAM CHEST 2 VIEWS: CPT

## 2019-05-03 PROCEDURE — 80053 COMPREHEN METABOLIC PANEL: CPT

## 2019-05-03 PROCEDURE — 71275 CT ANGIOGRAPHY CHEST: CPT

## 2019-05-03 PROCEDURE — 84484 ASSAY OF TROPONIN QUANT: CPT

## 2019-05-03 PROCEDURE — 73610 X-RAY EXAM OF ANKLE: CPT

## 2019-05-03 PROCEDURE — 99285 EMERGENCY DEPT VISIT HI MDM: CPT

## 2019-05-03 PROCEDURE — 96372 THER/PROPH/DIAG INJ SC/IM: CPT

## 2019-05-03 PROCEDURE — 74011250637 HC RX REV CODE- 250/637: Performed by: EMERGENCY MEDICINE

## 2019-05-03 RX ORDER — OXYCODONE AND ACETAMINOPHEN 5; 325 MG/1; MG/1
2 TABLET ORAL
Status: COMPLETED | OUTPATIENT
Start: 2019-05-03 | End: 2019-05-03

## 2019-05-03 RX ORDER — FUROSEMIDE 10 MG/ML
40 INJECTION INTRAMUSCULAR; INTRAVENOUS
Status: COMPLETED | OUTPATIENT
Start: 2019-05-03 | End: 2019-05-03

## 2019-05-03 RX ADMIN — OXYCODONE HYDROCHLORIDE AND ACETAMINOPHEN 2 TABLET: 5; 325 TABLET ORAL at 22:29

## 2019-05-03 RX ADMIN — FUROSEMIDE 40 MG: 10 INJECTION, SOLUTION INTRAMUSCULAR; INTRAVENOUS at 19:10

## 2019-05-03 NOTE — ED NOTES
I performed a brief evaluation, including history and physical, of the patient here in triage and I have determined that pt will need further treatment and evaluation from the main side ER physician. I have placed initial orders to help in expediting patients care. May 03, 2019 at [address] [place] [address] There were no vitals taken for this visit.

## 2019-05-04 ENCOUNTER — APPOINTMENT (OUTPATIENT)
Dept: VASCULAR SURGERY | Age: 69
End: 2019-05-04
Attending: EMERGENCY MEDICINE
Payer: MEDICARE

## 2019-05-04 VITALS
WEIGHT: 220 LBS | HEIGHT: 64 IN | HEART RATE: 97 BPM | OXYGEN SATURATION: 97 % | SYSTOLIC BLOOD PRESSURE: 143 MMHG | TEMPERATURE: 99.5 F | BODY MASS INDEX: 37.56 KG/M2 | RESPIRATION RATE: 18 BRPM | DIASTOLIC BLOOD PRESSURE: 79 MMHG

## 2019-05-04 VITALS
TEMPERATURE: 98.8 F | WEIGHT: 220 LBS | SYSTOLIC BLOOD PRESSURE: 135 MMHG | HEART RATE: 96 BPM | RESPIRATION RATE: 18 BRPM | BODY MASS INDEX: 37.56 KG/M2 | HEIGHT: 64 IN | DIASTOLIC BLOOD PRESSURE: 48 MMHG | OXYGEN SATURATION: 99 %

## 2019-05-04 DIAGNOSIS — M79.605 LEFT LEG PAIN: Primary | ICD-10-CM

## 2019-05-04 LAB
ATRIAL RATE: 101 BPM
CALCULATED P AXIS, ECG09: 58 DEGREES
CALCULATED R AXIS, ECG10: 34 DEGREES
CALCULATED T AXIS, ECG11: 69 DEGREES
DIAGNOSIS, 93000: NORMAL
P-R INTERVAL, ECG05: 146 MS
Q-T INTERVAL, ECG07: 356 MS
QRS DURATION, ECG06: 84 MS
QTC CALCULATION (BEZET), ECG08: 461 MS
VENTRICULAR RATE, ECG03: 101 BPM

## 2019-05-04 PROCEDURE — 93971 EXTREMITY STUDY: CPT

## 2019-05-04 PROCEDURE — 74011250637 HC RX REV CODE- 250/637: Performed by: EMERGENCY MEDICINE

## 2019-05-04 PROCEDURE — 96372 THER/PROPH/DIAG INJ SC/IM: CPT

## 2019-05-04 PROCEDURE — 74011636320 HC RX REV CODE- 636/320: Performed by: EMERGENCY MEDICINE

## 2019-05-04 PROCEDURE — 99281 EMR DPT VST MAYX REQ PHY/QHP: CPT

## 2019-05-04 PROCEDURE — 74011250636 HC RX REV CODE- 250/636: Performed by: EMERGENCY MEDICINE

## 2019-05-04 RX ORDER — TRAMADOL HYDROCHLORIDE 50 MG/1
50 TABLET ORAL
Qty: 12 TAB | Refills: 0 | Status: SHIPPED | OUTPATIENT
Start: 2019-05-04 | End: 2019-05-07

## 2019-05-04 RX ORDER — OXYCODONE AND ACETAMINOPHEN 5; 325 MG/1; MG/1
2 TABLET ORAL
Status: COMPLETED | OUTPATIENT
Start: 2019-05-04 | End: 2019-05-04

## 2019-05-04 RX ORDER — ENOXAPARIN SODIUM 100 MG/ML
1 INJECTION SUBCUTANEOUS
Status: COMPLETED | OUTPATIENT
Start: 2019-05-04 | End: 2019-05-04

## 2019-05-04 RX ORDER — ENOXAPARIN SODIUM 100 MG/ML
INJECTION SUBCUTANEOUS
Status: DISCONTINUED
Start: 2019-05-04 | End: 2019-05-04 | Stop reason: HOSPADM

## 2019-05-04 RX ORDER — OXYCODONE AND ACETAMINOPHEN 5; 325 MG/1; MG/1
TABLET ORAL
Status: DISCONTINUED
Start: 2019-05-04 | End: 2019-05-04 | Stop reason: HOSPADM

## 2019-05-04 RX ADMIN — ENOXAPARIN SODIUM 100 MG: 100 INJECTION SUBCUTANEOUS at 01:51

## 2019-05-04 RX ADMIN — OXYCODONE HYDROCHLORIDE AND ACETAMINOPHEN 2 TABLET: 5; 325 TABLET ORAL at 02:06

## 2019-05-04 RX ADMIN — IOPAMIDOL 90 ML: 755 INJECTION, SOLUTION INTRAVENOUS at 00:19

## 2019-05-04 NOTE — ED PROVIDER NOTES
EMERGENCY DEPARTMENT HISTORY AND PHYSICAL EXAM    Date: 5/4/2019  Patient Name: Bita Serrano    History of Presenting Illness     Chief Complaint   Patient presents with    Leg Pain         History Provided By: Patient     Chief Complaint: Leg pain and swelling   Duration: days   Timing:  Gradual   Location: left lower leg   Quality:\"its just swollen and painful\"   Severity: moderate   Modifying Factors: None   Associated Symptoms: none       Additional History (Context): Bita Serrano is a 79 y.o. female with a history of diabetes, hypertension, dyslipidemia, obesity, COPD who presents today for left lower leg pain and swelling. Patient states she was seen last night and had a reassuring work-up done. States she was told to return today for ultrasound to rule out DVT. Patient states condition is the same as last 9 patient denies chest pain or shortness of breath. PCP: Flower Cannon MD    Current Outpatient Medications   Medication Sig Dispense Refill    traMADol (ULTRAM) 50 mg tablet Take 1 Tab by mouth every six (6) hours as needed for Pain for up to 3 days. Max Daily Amount: 200 mg. 12 Tab 0    HYDROcodone-acetaminophen (NORCO) 5-325 mg per tablet Take 1-2 tablets PO every 4-6 hours as needed for pain control. If over the counter ibuprofen or acetaminophen was suggested, then only take the vicodin for pain not well controlled with the over the counter medication. 5 Tab 0    furosemide (LASIX) 40 mg tablet Take 0.5 Tabs by mouth daily. 1 Tab 0    sacubitril-valsartan (ENTRESTO) 49 mg/51 mg tablet Take 1 Tab by mouth two (2) times a day. 60 Tab 4    baclofen (LIORESAL) 10 mg tablet Take  by mouth three (3) times daily.  gabapentin (NEURONTIN) 300 mg capsule       raNITIdine (ZANTAC) 150 mg tablet Take 1 Tab by mouth two (2) times a day. 60 Tab 4    carvedilol (COREG) 6.25 mg tablet Take 1 Tab by mouth two (2) times daily (with meals).  60 Tab 6    leflunomide (ARAVA) 20 mg tablet Take 20 mg by mouth daily.  ticagrelor (BRILINTA) 90 mg tablet Take 1 Tab by mouth two (2) times a day. 60 Tab 11    atorvastatin (LIPITOR) 20 mg tablet Take 1 Tab by mouth nightly. 30 Tab 0    aspirin 81 mg chewable tablet Take 1 Tab by mouth daily. 30 Tab 0    insulin detemir (LEVEMIR) 100 unit/mL injection 15 units daily for diabetes 2 Vial 0    albuterol (PROVENTIL HFA, VENTOLIN HFA, PROAIR HFA) 90 mcg/actuation inhaler Take 2 Puffs by inhalation every four (4) hours as needed for Wheezing. 1 Inhaler 3    desloratadine (CLARINEX) 5 mg tablet Take 1 Tab by mouth daily as needed for Allergies. 30 Tab 0    mometasone (NASONEX) 50 mcg/actuation nasal spray 2 Sprays by Both Nostrils route daily as needed. For allergies 3 Container 3    umeclidinium-vilanterol (ANORO ELLIPTA) 62.5-25 mcg/actuation inhaler Take 1 Puff by inhalation daily. For COPD 3 Inhaler 3    albuterol (PROVENTIL VENTOLIN) 2.5 mg /3 mL (0.083 %) nebulizer solution 3 mL by Nebulization route every four (4) hours as needed for Wheezing. 60 Each 3    ferrous sulfate 325 mg (65 mg iron) tablet Take 1 Tab by mouth two (2) times a day. Indications: IRON DEFICIENCY ANEMIA 60 Tab 11       Past History     Past Medical History:  Past Medical History:   Diagnosis Date    Abnormal WBC count 5/17/2016    Bilateral shoulder pain 9/27/2016    Chondromalacia of both patellae     COPD (chronic obstructive pulmonary disease) ([facility] [address] 9/2015    mild-mod dz; Dr Nahum Lazaro    Diabetes [facility]) 2013    Diabetic eye exam ([facility] [address] 2016    Dilated cardiomyopathy ([facility] [address]     Dyslipidemia     Heart attack ([facility] [address]     History of echocardiogram 11/14/2014    Mild LVE. EF 40%. Mild, diffuse hypk. Mild LAE.   Mild MR.      Hypercholesteremia 1/08/14    Hypertension 2000    Noncompliance with medications 2/16/2016    Obesity     Orthostatic hypotension 5/17/2016    Osteoarthritis of both knees     Pain management 04/01/2016    Dr. Dong Laguerre     Popliteal cyst, bilateral      Vitamin D deficiency 10/16/14       Past Surgical History:  Past Surgical History:   Procedure Laterality Date    HX HEART CATHETERIZATION      HX TUBAL LIGATION         Family History:  Family History   Problem Relation Age of Onset    Diabetes Mother     Hypertension Mother     Hypertension Father     Kidney Disease Maternal Aunt 48        Dialysis       Social History:  Social History     Tobacco Use    Smoking status: Former Smoker     Packs/day: 0.25     Years: 48.00     Pack years: 12.00     Types: Cigarettes     Last attempt to quit: 2018     Years since quittin.9    Smokeless tobacco: Never Used   Substance Use Topics    Alcohol use: No     Alcohol/week: 0.0 oz    Drug use: No       Allergies:  No Known Allergies      Review of Systems   Review of Systems   Constitutional: Negative for chills and fever. HENT: Negative for congestion, rhinorrhea and sore throat. Respiratory: Negative for cough and shortness of breath. Cardiovascular: Negative for chest pain. Gastrointestinal: Negative for abdominal pain, blood in stool, constipation, diarrhea, nausea and vomiting. Genitourinary: Negative for dysuria, frequency and hematuria. Musculoskeletal: Positive for joint swelling. Negative for back pain and myalgias. Skin: Negative for rash and wound. Neurological: Negative for dizziness and headaches. All other systems reviewed and are negative. All Other Systems Negative  Physical Exam     Vitals:    19 1214 19 1539   BP: 143/79    Pulse: (!) 106 97   Resp: 18    Temp: 99.5 °F (37.5 °C)    SpO2: 97%    Weight: 99.8 kg (220 lb)    Height: 5' 4\" (1.626 m)      Physical Exam   Constitutional: She is oriented to person, place, and time. She appears well-developed and well-nourished. No distress. HENT:   Head: Normocephalic and atraumatic. Eyes: Conjunctivae are normal.   Neck: Normal range of motion. Neck supple.    Cardiovascular: Normal rate, regular rhythm and normal heart sounds. Pulmonary/Chest: Effort normal and breath sounds normal. No respiratory distress. She exhibits no tenderness. Abdominal: Soft. Bowel sounds are normal. She exhibits no distension. There is no tenderness. There is no rebound and no guarding. Musculoskeletal: She exhibits no edema or deformity. Left lower leg: She exhibits tenderness and swelling. Strong left DP and TP pulses, no ulceration or cellulitis   Neurological: She is alert and oriented to person, place, and time. She has normal reflexes. Skin: Skin is warm and dry. She is not diaphoretic. Psychiatric: She has a normal mood and affect. Nursing note and vitals reviewed. Diagnostic Study Results     Labs -   No results found for this or any previous visit (from the past 12 hour(s)). Radiologic Studies -   No orders to display     CT Results  (Last 48 hours)               05/04/19 0018  CTA CHEST W OR W WO CONT Final result    Impression:  IMPRESSION:       Study is limited by motion. There is a questionable small nonocclusive filling   defect in a right lower lobe segmental branch which is favored to be artifact   rather than a pulmonary embolus. No evidence of a large central pulmonary   embolus. Dilated pulmonary trunk suggestive of pulmonary hypertension. Similar subtle interstitial opacity in the upper lobes. No measurable pulmonary   nodules. Narrative:  CT CTA Chest Pulmonary       HISTORY: Shortness of breath, lower extremity pain and swelling. COMPARISON: Noncontrast CT chest 6/9/2018       TECHNIQUE: 2.5 millimeter contiguous axial images from the lung apices to the   level of the adrenal glands following timed contrast bolus for maximum   intravascular enhancement of the pulmonary arteries. Images reviewed in soft   tissue and lung windows.  Coronal and sagittal MIP reformations were performed   for better evaluation of tortuous branching pulmonary vessels. All CT scans at this facility are performed using dose optimization technique as   appropriate to a performed exam, to include automated exposure control,   adjustment of the MA and/or kV according to patient size (including appropriate   matching for site-specific examinations) or use of  iterative reconstruction   technique. CONTRAST: 90 cc Isovue-370 administered intravenously. FINDINGS:        Pulmonary arteries: Pulmonary trunk is dilated measuring 4 cm in diameter   compared to the aorta which measures 3 cm. Evaluation for pulmonary embolism is   limited by motion artifact. There is no filling defect in the main pulmonary or   descending intralobar arteries. Artifact versus nonocclusive filling defect in a   right lower lobe segmental branch (106/2). Artifact is favored over a small   embolus. Lungs: No airspace consolidation or mass. Again there is suggestion of subtle   interstitial prominence the upper lobes. No measurable nodule. Pleura: No pleural effusion or pneumothorax. Lymph Nodes: No adenopathy. Heart and Aorta: Nonaneurysmal aorta. No pericardial effusion. Visualized abdomen: No abnormalities seen. Osseous structures: No acute findings. Normal thoracic alignment and vertebral   body heights. Mild spondylosis. CXR Results  (Last 48 hours)               05/03/19 1832  XR CHEST PA LAT Final result    Impression:  IMPRESSION:   1. No acute infiltrate or effusion appreciated. Narrative:  EXAM: Chest Radiographs       INDICATION: Shortness of breath       TECHNIQUE: PA and lateral views of the chest       COMPARISON: 1/21/2019, 6/8/2018, 5/22/2018 and 5/21/2018       FINDINGS: No pneumothorax identified. The lungs are clear. No infiltrates   identified. No effusions appreciated. The cardiomediastinal silhouette is   unremarkable. The pulmonary vascularity is unremarkable. Degenerative changes   of the thoracic spine. Medical Decision Making   I am the first provider for this patient. I reviewed the vital signs, available nursing notes, past medical history, past surgical history, family history and social history. Vital Signs-Reviewed the patient's vital signs. Records Reviewed: Nursing Notes and Old Medical Records     Procedures: None   Procedures    Provider Notes (Medical Decision Making):     Differential: Fracture, contusion, musculoskeletal pain, DVT    Plan: We will order duplex. 3:43 PM  Have shared reassuring preliminary read with patient. Will discharge home with pain medication. Have discussed risks and benefits of pain medicine. Advised close PCP follow-up. Patient agrees with the plan and management and states all questions have been thoroughly answered and there are no more remaining questions. MED RECONCILIATION:  No current facility-administered medications for this encounter. Current Outpatient Medications   Medication Sig    traMADol (ULTRAM) 50 mg tablet Take 1 Tab by mouth every six (6) hours as needed for Pain for up to 3 days. Max Daily Amount: 200 mg.    HYDROcodone-acetaminophen (NORCO) 5-325 mg per tablet Take 1-2 tablets PO every 4-6 hours as needed for pain control. If over the counter ibuprofen or acetaminophen was suggested, then only take the vicodin for pain not well controlled with the over the counter medication.  furosemide (LASIX) 40 mg tablet Take 0.5 Tabs by mouth daily.  sacubitril-valsartan (ENTRESTO) 49 mg/51 mg tablet Take 1 Tab by mouth two (2) times a day.  baclofen (LIORESAL) 10 mg tablet Take  by mouth three (3) times daily.  gabapentin (NEURONTIN) 300 mg capsule     raNITIdine (ZANTAC) 150 mg tablet Take 1 Tab by mouth two (2) times a day.  carvedilol (COREG) 6.25 mg tablet Take 1 Tab by mouth two (2) times daily (with meals).  leflunomide (ARAVA) 20 mg tablet Take 20 mg by mouth daily.     ticagrelor (BRILINTA) 90 mg tablet Take 1 Tab by mouth two (2) times a day.  atorvastatin (LIPITOR) 20 mg tablet Take 1 Tab by mouth nightly.  aspirin 81 mg chewable tablet Take 1 Tab by mouth daily.  insulin detemir (LEVEMIR) 100 unit/mL injection 15 units daily for diabetes    albuterol (PROVENTIL HFA, VENTOLIN HFA, PROAIR HFA) 90 mcg/actuation inhaler Take 2 Puffs by inhalation every four (4) hours as needed for Wheezing.  desloratadine (CLARINEX) 5 mg tablet Take 1 Tab by mouth daily as needed for Allergies.  mometasone (NASONEX) 50 mcg/actuation nasal spray 2 Sprays by Both Nostrils route daily as needed. For allergies    umeclidinium-vilanterol (ANORO ELLIPTA) 62.5-25 mcg/actuation inhaler Take 1 Puff by inhalation daily. For COPD    albuterol (PROVENTIL VENTOLIN) 2.5 mg /3 mL (0.083 %) nebulizer solution 3 mL by Nebulization route every four (4) hours as needed for Wheezing.  ferrous sulfate 325 mg (65 mg iron) tablet Take 1 Tab by mouth two (2) times a day. Indications: IRON DEFICIENCY ANEMIA       Disposition:  Home     DISCHARGE NOTE:   Pt has been reexamined. Patient has no new complaints, changes, or physical findings. Care plan outlined and precautions discussed. Results of workup were reviewed with the patient. All medications were reviewed with the patient. All of pt's questions and concerns were addressed. Patient was instructed and agrees to follow up with PCP as well as to return to the ED upon further deterioration. Patient is ready to go home.     Follow-up Information     Follow up With Specialties Details Why Contact Info    [facility] EMERGENCY DEPT Emergency Medicine  As needed [address]  [phone]    Ashley Barrett MD Family Practice In 2 days  [address]  [phone]            Current Discharge Medication List      START taking these medications    Details   traMADol (ULTRAM) 50 mg tablet Take 1 Tab by mouth every six (6) hours as needed for Pain for up to 3 days. Max Daily Amount: 200 mg. Qty: 12 Tab, Refills: 0    Associated Diagnoses: Left leg pain                 Diagnosis     Clinical Impression:   1.  Left leg pain

## 2019-05-04 NOTE — ED NOTES
I have reviewed discharge instructions with the patient. The patient verbalized understanding. Patient discharged home and in stable condition.

## 2019-05-04 NOTE — ED TRIAGE NOTES
Pt arrived to ED via POV with CO LLE pain.  Seen in ED yesterday for same and advised to return today for PVL

## 2019-05-04 NOTE — DISCHARGE INSTRUCTIONS
Patient Education        Leg Pain: Care Instructions  Your Care Instructions  Many things can cause leg pain. Too much exercise or overuse can cause a muscle cramp (or charley horse). You can get leg cramps from not eating a balanced diet that has enough potassium, calcium, and other minerals. If you do not drink enough fluids or are taking certain medicines, you may develop leg cramps. Other causes of leg pain include injuries, blood flow problems, nerve damage, and twisted and enlarged veins (varicose veins). You can usually ease pain with self-care. Your doctor may recommend that you rest your leg and keep it elevated. Follow-up care is a key part of your treatment and safety. Be sure to make and go to all appointments, and call your doctor if you are having problems. It's also a good idea to know your test results and keep a list of the medicines you take. How can you care for yourself at home? · Take pain medicines exactly as directed. ? If the doctor gave you a prescription medicine for pain, take it as prescribed. ? If you are not taking a prescription pain medicine, ask your doctor if you can take an over-the-counter medicine. · Take any other medicines exactly as prescribed. Call your doctor if you think you are having a problem with your medicine. · Rest your leg while you have pain, and avoid standing for long periods of time. · Prop up your leg at or above the level of your heart when possible. · Make sure you are eating a balanced diet that is rich in calcium, potassium, and magnesium, especially if you are pregnant. · If directed by your doctor, put ice or a cold pack on the area for 10 to 20 minutes at a time. Put a thin cloth between the ice and your skin. · Your leg may be in a splint, a brace, or an elastic bandage, and you may have crutches to help you walk. Follow your doctor's directions about how long to wear supports and how to use the crutches.   When should you call for help?  Call 911 anytime you think you may need emergency care. For example, call if:    · You have sudden chest pain and shortness of breath, or you cough up blood.     · Your leg is cool or pale or changes color.    Call your doctor now or seek immediate medical care if:    · You have increasing or severe pain.     · Your leg suddenly feels weak and you cannot move it.     · You have signs of a blood clot, such as:  ? Pain in your calf, back of the knee, thigh, or groin. ? Redness and swelling in your leg or groin.     · You have signs of infection, such as:  ? Increased pain, swelling, warmth, or redness. ? Red streaks leading from the sore area. ? Pus draining from a place on your leg. ? A fever.     · You cannot bear weight on your leg.    Watch closely for changes in your health, and be sure to contact your doctor if:    · You do not get better as expected. Where can you learn more? Go to http://jason-charity.info/. Enter P457 in the search box to learn more about \"Leg Pain: Care Instructions. \"  Current as of: September 23, 2018  Content Version: 11.9  © 1550-1001 [employer] Care instructions adapted under license by [employer] (which disclaims liability or warranty for this information). If you have questions about a medical condition or this instruction, always ask your healthcare professional. [place] [address] any warranty or liability for your use of this information.

## 2019-05-04 NOTE — ED PROVIDER NOTES
EMERGENCY DEPARTMENT HISTORY AND PHYSICAL EXAM    Date: 5/3/2019  Patient Name: Gina Brody    History of Presenting Illness     Chief Complaint   Patient presents with    Shortness of Breath         History Provided By: Patient      Additional History (Context): Gina Brody is a 79 y.o. female with hypertension, hyperlipidemia, obesity, myocardial infarction and CHF who presents with unilateral left leg swelling and pain, ankle pain on the left side, and shortness of breath. Her leg swelling and pain began yesterday the swelling began before the ankle pain and then the shortness of breath while she said that chronically since her MI last year, she says is been worse over the past few days. Does have a history of COPD but is not wheezing. Says the Lasix given earlier from triage of the only made her void but has not helped with her swelling. PCP: Miguel Hsu MD    Current Facility-Administered Medications   Medication Dose Route Frequency Provider Last Rate Last Dose    enoxaparin (LOVENOX) injection 100 mg  1 mg/kg SubCUTAneous NOW Mariela Schaffer PA         Current Outpatient Medications   Medication Sig Dispense Refill    HYDROcodone-acetaminophen (NORCO) 5-325 mg per tablet Take 1-2 tablets PO every 4-6 hours as needed for pain control. If over the counter ibuprofen or acetaminophen was suggested, then only take the vicodin for pain not well controlled with the over the counter medication. 5 Tab 0    furosemide (LASIX) 40 mg tablet Take 0.5 Tabs by mouth daily. 1 Tab 0    sacubitril-valsartan (ENTRESTO) 49 mg/51 mg tablet Take 1 Tab by mouth two (2) times a day. 60 Tab 4    baclofen (LIORESAL) 10 mg tablet Take  by mouth three (3) times daily.  gabapentin (NEURONTIN) 300 mg capsule       raNITIdine (ZANTAC) 150 mg tablet Take 1 Tab by mouth two (2) times a day. 60 Tab 4    carvedilol (COREG) 6.25 mg tablet Take 1 Tab by mouth two (2) times daily (with meals).  60 Tab 6    leflunomide (ARAVA) 20 mg tablet Take 20 mg by mouth daily.  ticagrelor (BRILINTA) 90 mg tablet Take 1 Tab by mouth two (2) times a day. 60 Tab 11    atorvastatin (LIPITOR) 20 mg tablet Take 1 Tab by mouth nightly. 30 Tab 0    aspirin 81 mg chewable tablet Take 1 Tab by mouth daily. 30 Tab 0    insulin detemir (LEVEMIR) 100 unit/mL injection 15 units daily for diabetes 2 Vial 0    albuterol (PROVENTIL HFA, VENTOLIN HFA, PROAIR HFA) 90 mcg/actuation inhaler Take 2 Puffs by inhalation every four (4) hours as needed for Wheezing. 1 Inhaler 3    desloratadine (CLARINEX) 5 mg tablet Take 1 Tab by mouth daily as needed for Allergies. 30 Tab 0    mometasone (NASONEX) 50 mcg/actuation nasal spray 2 Sprays by Both Nostrils route daily as needed. For allergies 3 Container 3    umeclidinium-vilanterol (ANORO ELLIPTA) 62.5-25 mcg/actuation inhaler Take 1 Puff by inhalation daily. For COPD 3 Inhaler 3    albuterol (PROVENTIL VENTOLIN) 2.5 mg /3 mL (0.083 %) nebulizer solution 3 mL by Nebulization route every four (4) hours as needed for Wheezing. 60 Each 3    ferrous sulfate 325 mg (65 mg iron) tablet Take 1 Tab by mouth two (2) times a day. Indications: IRON DEFICIENCY ANEMIA 60 Tab 11       Past History     Past Medical History:  Past Medical History:   Diagnosis Date    Abnormal WBC count 5/17/2016    Bilateral shoulder pain 9/27/2016    Chondromalacia of both patellae     COPD (chronic obstructive pulmonary disease) ([facility] [address] 9/2015    mild-mod dz; Dr Abhishek Garcia    Diabetes [facility]) 2013    Diabetic eye exam ([facility] [address] 2016    Dilated cardiomyopathy ([facility] [address]     Dyslipidemia     Heart attack ([facility] [address]     History of echocardiogram 11/14/2014    Mild LVE. EF 40%. Mild, diffuse hypk. Mild LAE.   Mild MR.      Hypercholesteremia 1/08/14    Hypertension 2000    Noncompliance with medications 2/16/2016    Obesity     Orthostatic hypotension 5/17/2016    Osteoarthritis of both knees     Pain management 04/01/2016 Dr. Roche Lands     Popliteal cyst, bilateral      Vitamin D deficiency 10/16/14       Past Surgical History:  Past Surgical History:   Procedure Laterality Date    HX HEART CATHETERIZATION      HX TUBAL LIGATION         Family History:  Family History   Problem Relation Age of Onset    Diabetes Mother     Hypertension Mother     Hypertension Father     Kidney Disease Maternal Aunt 48        Dialysis       Social History:  Social History     Tobacco Use    Smoking status: Former Smoker     Packs/day: 0.25     Years: 48.00     Pack years: 12.00     Types: Cigarettes     Last attempt to quit: 2018     Years since quittin.9    Smokeless tobacco: Never Used   Substance Use Topics    Alcohol use: No     Alcohol/week: 0.0 oz    Drug use: No       Allergies:  No Known Allergies      Review of Systems   Review of Systems   Constitutional: Negative for fever. Respiratory: Positive for shortness of breath. Negative for cough and wheezing. Cardiovascular: Positive for leg swelling. Gastrointestinal: Negative for abdominal pain. All Other Systems Negative  Physical Exam     Vitals:    19 2100 19 2115 19 2145 19 2345   BP: 153/66 157/67 159/73 135/48   Pulse:       Resp:       Temp:       SpO2: 100% 99% 100% 99%   Weight:       Height:         Physical Exam   Constitutional: She is oriented to person, place, and time. She appears well-developed. HENT:   Head: Normocephalic and atraumatic. Eyes: Pupils are equal, round, and reactive to light. Neck: No JVD present. No tracheal deviation present. No thyromegaly present. Cardiovascular: Normal rate, regular rhythm and normal heart sounds. Exam reveals no gallop and no friction rub. No murmur heard. Pulmonary/Chest: Effort normal and breath sounds normal. No stridor. No respiratory distress. She has no wheezes. She has no rales. She exhibits no tenderness. Abdominal: Soft. She exhibits no distension and no mass. There is no tenderness. There is no rebound and no guarding. Musculoskeletal: She exhibits edema and tenderness. Diffuse lower left leg swelling. DP PT pulses palpable in lower and foot. Tenderness. No obvious deformity. Lymphadenopathy:     She has no cervical adenopathy. Neurological: She is alert and oriented to person, place, and time. Skin: Skin is warm and dry. No rash noted. No erythema. No pallor. Psychiatric: She has a normal mood and affect. Her behavior is normal. Thought content normal.   Nursing note and vitals reviewed. Diagnostic Study Results     Labs -     Recent Results (from the past 12 hour(s))   EKG, 12 LEAD, INITIAL    Collection Time: 05/03/19  6:18 PM   Result Value Ref Range    Ventricular Rate 101 BPM    Atrial Rate 101 BPM    P-R Interval 146 ms    QRS Duration 84 ms    Q-T Interval 356 ms    QTC Calculation (Bezet) 461 ms    Calculated P Axis 58 degrees    Calculated R Axis 34 degrees    Calculated T Axis 69 degrees    Diagnosis       Sinus tachycardia with occasional and consecutive premature ventricular   complexes  Abnormal ECG  When compared with ECG of 21-JAN-2019 17:00,  premature ventricular complexes are now present     CBC WITH AUTOMATED DIFF    Collection Time: 05/03/19  6:22 PM   Result Value Ref Range    WBC 7.9 4.6 - 13.2 K/uL    RBC 3.44 (L) 4.20 - 5.30 M/uL    HGB 9.7 (L) 12.0 - 16.0 g/dL    HCT 30.4 (L) 35.0 - 45.0 %    MCV 88.4 74.0 - 97.0 FL    MCH 28.2 24.0 - 34.0 PG    MCHC 31.9 31.0 - 37.0 g/dL    RDW 17.4 (H) 11.6 - 14.5 %    PLATELET 278 887 - 300 K/uL    MPV 9.0 (L) 9.2 - 11.8 FL    NEUTROPHILS 60 40 - 73 %    LYMPHOCYTES 25 21 - 52 %    MONOCYTES 13 (H) 3 - 10 %    EOSINOPHILS 2 0 - 5 %    BASOPHILS 0 0 - 2 %    ABS. NEUTROPHILS 4.9 1.8 - 8.0 K/UL    ABS. LYMPHOCYTES 1.9 0.9 - 3.6 K/UL    ABS. MONOCYTES 1.0 0.05 - 1.2 K/UL    ABS. EOSINOPHILS 0.1 0.0 - 0.4 K/UL    ABS.  BASOPHILS 0.0 0.0 - 0.1 K/UL    DF AUTOMATED     METABOLIC PANEL, COMPREHENSIVE    Collection Time: 05/03/19  6:22 PM   Result Value Ref Range    Sodium 142 136 - 145 mmol/L    Potassium 4.4 3.5 - 5.5 mmol/L    Chloride 114 (H) 100 - 108 mmol/L    CO2 22 21 - 32 mmol/L    Anion gap 6 3.0 - 18 mmol/L    Glucose 138 (H) 74 - 99 mg/dL    BUN 32 (H) 7.0 - 18 MG/DL    Creatinine 1.75 (H) 0.6 - 1.3 MG/DL    BUN/Creatinine ratio 18 12 - 20      GFR est AA 35 (L) >60 ml/min/1.73m2    GFR est non-AA 29 (L) >60 ml/min/1.73m2    Calcium 8.9 8.5 - 10.1 MG/DL    Bilirubin, total 0.4 0.2 - 1.0 MG/DL    ALT (SGPT) 16 13 - 56 U/L    AST (SGOT) 10 (L) 15 - 37 U/L    Alk. phosphatase 142 (H) 45 - 117 U/L    Protein, total 7.3 6.4 - 8.2 g/dL    Albumin 3.6 3.4 - 5.0 g/dL    Globulin 3.7 2.0 - 4.0 g/dL    A-G Ratio 1.0 0.8 - 1.7     TROPONIN I    Collection Time: 05/03/19  6:22 PM   Result Value Ref Range    Troponin-I, QT <0.02 0.0 - 0.045 NG/ML   NT-PRO BNP    Collection Time: 05/03/19  6:22 PM   Result Value Ref Range    NT pro- 0 - 900 PG/ML       Radiologic Studies -   CTA CHEST W OR W WO CONT   Final Result   IMPRESSION:      Study is limited by motion. There is a questionable small nonocclusive filling   defect in a right lower lobe segmental branch which is favored to be artifact   rather than a pulmonary embolus. No evidence of a large central pulmonary   embolus. Dilated pulmonary trunk suggestive of pulmonary hypertension. Similar subtle interstitial opacity in the upper lobes. No measurable pulmonary   nodules. XR CHEST PA LAT   Final Result   IMPRESSION:   1. No acute infiltrate or effusion appreciated. XR ANKLE LT MIN 3 V    (Results Pending)     CT Results  (Last 48 hours)               05/04/19 0018  CTA CHEST W OR W WO CONT Final result    Impression:  IMPRESSION:       Study is limited by motion.  There is a questionable small nonocclusive filling   defect in a right lower lobe segmental branch which is favored to be artifact   rather than a pulmonary embolus. No evidence of a large central pulmonary   embolus. Dilated pulmonary trunk suggestive of pulmonary hypertension. Similar subtle interstitial opacity in the upper lobes. No measurable pulmonary   nodules. Narrative:  CT CTA Chest Pulmonary       HISTORY: Shortness of breath, lower extremity pain and swelling. COMPARISON: Noncontrast CT chest 6/9/2018       TECHNIQUE: 2.5 millimeter contiguous axial images from the lung apices to the   level of the adrenal glands following timed contrast bolus for maximum   intravascular enhancement of the pulmonary arteries. Images reviewed in soft   tissue and lung windows. Coronal and sagittal MIP reformations were performed   for better evaluation of tortuous branching pulmonary vessels. All CT scans at this facility are performed using dose optimization technique as   appropriate to a performed exam, to include automated exposure control,   adjustment of the MA and/or kV according to patient size (including appropriate   matching for site-specific examinations) or use of  iterative reconstruction   technique. CONTRAST: 90 cc Isovue-370 administered intravenously. FINDINGS:        Pulmonary arteries: Pulmonary trunk is dilated measuring 4 cm in diameter   compared to the aorta which measures 3 cm. Evaluation for pulmonary embolism is   limited by motion artifact. There is no filling defect in the main pulmonary or   descending intralobar arteries. Artifact versus nonocclusive filling defect in a   right lower lobe segmental branch (106/2). Artifact is favored over a small   embolus. Lungs: No airspace consolidation or mass. Again there is suggestion of subtle   interstitial prominence the upper lobes. No measurable nodule. Pleura: No pleural effusion or pneumothorax. Lymph Nodes: No adenopathy. Heart and Aorta: Nonaneurysmal aorta. No pericardial effusion.         Visualized abdomen: No abnormalities seen. Osseous structures: No acute findings. Normal thoracic alignment and vertebral   body heights. Mild spondylosis. CXR Results  (Last 48 hours)               05/03/19 1832  XR CHEST PA LAT Final result    Impression:  IMPRESSION:   1. No acute infiltrate or effusion appreciated. Narrative:  EXAM: Chest Radiographs       INDICATION: Shortness of breath       TECHNIQUE: PA and lateral views of the chest       COMPARISON: 1/21/2019, 6/8/2018, 5/22/2018 and 5/21/2018       FINDINGS: No pneumothorax identified. The lungs are clear. No infiltrates   identified. No effusions appreciated. The cardiomediastinal silhouette is   unremarkable. The pulmonary vascularity is unremarkable. Degenerative changes   of the thoracic spine. Medical Decision Making   I am the first provider for this patient. I reviewed the vital signs, available nursing notes, past medical history, past surgical history, family history and social history. Vital Signs-Reviewed the patient's vital signs. Records Reviewed: Nursing Notes    Procedures:  Procedures    Provider Notes (Medical Decision Making): Nothing acute on x-ray. No definite PE on CTA. Will give dose of Lovenox and instruct patient to return tomorrow within 12 hours for PVL of left lower leg. Patient has creatinine 1.75 and cannot get 24-hour dosing. Received Lasix but did not feel as if other than making her voided and improved her left leg swelling. MED RECONCILIATION:  Current Facility-Administered Medications   Medication Dose Route Frequency    enoxaparin (LOVENOX) injection 100 mg  1 mg/kg SubCUTAneous NOW     Current Outpatient Medications   Medication Sig    HYDROcodone-acetaminophen (NORCO) 5-325 mg per tablet Take 1-2 tablets PO every 4-6 hours as needed for pain control.   If over the counter ibuprofen or acetaminophen was suggested, then only take the vicodin for pain not well controlled with the over the counter medication.  furosemide (LASIX) 40 mg tablet Take 0.5 Tabs by mouth daily.  sacubitril-valsartan (ENTRESTO) 49 mg/51 mg tablet Take 1 Tab by mouth two (2) times a day.  baclofen (LIORESAL) 10 mg tablet Take  by mouth three (3) times daily.  gabapentin (NEURONTIN) 300 mg capsule     raNITIdine (ZANTAC) 150 mg tablet Take 1 Tab by mouth two (2) times a day.  carvedilol (COREG) 6.25 mg tablet Take 1 Tab by mouth two (2) times daily (with meals).  leflunomide (ARAVA) 20 mg tablet Take 20 mg by mouth daily.  ticagrelor (BRILINTA) 90 mg tablet Take 1 Tab by mouth two (2) times a day.  atorvastatin (LIPITOR) 20 mg tablet Take 1 Tab by mouth nightly.  aspirin 81 mg chewable tablet Take 1 Tab by mouth daily.  insulin detemir (LEVEMIR) 100 unit/mL injection 15 units daily for diabetes    albuterol (PROVENTIL HFA, VENTOLIN HFA, PROAIR HFA) 90 mcg/actuation inhaler Take 2 Puffs by inhalation every four (4) hours as needed for Wheezing.  desloratadine (CLARINEX) 5 mg tablet Take 1 Tab by mouth daily as needed for Allergies.  mometasone (NASONEX) 50 mcg/actuation nasal spray 2 Sprays by Both Nostrils route daily as needed. For allergies    umeclidinium-vilanterol (ANORO ELLIPTA) 62.5-25 mcg/actuation inhaler Take 1 Puff by inhalation daily. For COPD    albuterol (PROVENTIL VENTOLIN) 2.5 mg /3 mL (0.083 %) nebulizer solution 3 mL by Nebulization route every four (4) hours as needed for Wheezing.  ferrous sulfate 325 mg (65 mg iron) tablet Take 1 Tab by mouth two (2) times a day. Indications: IRON DEFICIENCY ANEMIA       Disposition:  home    DISCHARGE NOTE:   1:12 AM    Pt has been reexamined. Patient has no new complaints, changes, or physical findings. Care plan outlined and precautions discussed. Results of labs, CTA, CXR were reviewed with the patient. All medications were reviewed with the patient; will d/c home with PVL.  All of pt's questions and concerns were addressed. Patient was instructed and agrees to follow up with ED in 12 hrs, as well as to return to the ED upon further deterioration. Patient is ready to go home. Follow-up Information     Follow up With Specialties Details Why [address]    [facility] EMERGENCY DEPT Emergency Medicine  return in 12 hrs for a PVL of your left leg [address]    [facility] EMERGENCY DEPT Emergency Medicine  If symptoms worsen return immediately [address]  [phone]          Current Discharge Medication List        Diagnosis     Clinical Impression:   1. Acute leg pain, left    2. Acute left ankle pain    3.  Chronic systolic congestive heart failure ([facility] [address]

## 2019-05-22 ENCOUNTER — HOSPITAL ENCOUNTER (EMERGENCY)
Age: 69
Discharge: HOME | End: 2019-05-22
Attending: EMERGENCY MEDICINE
Payer: MEDICARE

## 2019-05-22 ENCOUNTER — APPOINTMENT (OUTPATIENT)
Dept: GENERAL RADIOLOGY | Age: 69
End: 2019-05-22
Attending: PHYSICIAN ASSISTANT
Payer: MEDICARE

## 2019-05-22 VITALS
TEMPERATURE: 98.9 F | DIASTOLIC BLOOD PRESSURE: 82 MMHG | SYSTOLIC BLOOD PRESSURE: 173 MMHG | OXYGEN SATURATION: 99 % | RESPIRATION RATE: 16 BRPM | HEART RATE: 78 BPM

## 2019-05-22 DIAGNOSIS — S92.351A CLOSED DISPLACED FRACTURE OF FIFTH METATARSAL BONE OF RIGHT FOOT, INITIAL ENCOUNTER: Primary | ICD-10-CM

## 2019-05-22 PROCEDURE — 73630 X-RAY EXAM OF FOOT: CPT

## 2019-05-22 PROCEDURE — 73610 X-RAY EXAM OF ANKLE: CPT

## 2019-05-22 PROCEDURE — 74011250637 HC RX REV CODE- 250/637: Performed by: PHYSICIAN ASSISTANT

## 2019-05-22 PROCEDURE — 75810000053 HC SPLINT APPLICATION

## 2019-05-22 PROCEDURE — 99284 EMERGENCY DEPT VISIT MOD MDM: CPT

## 2019-05-22 RX ORDER — OXYCODONE AND ACETAMINOPHEN 5; 325 MG/1; MG/1
1 TABLET ORAL
Status: COMPLETED | OUTPATIENT
Start: 2019-05-22 | End: 2019-05-22

## 2019-05-22 RX ORDER — OXYCODONE AND ACETAMINOPHEN 5; 325 MG/1; MG/1
1 TABLET ORAL
Qty: 16 TAB | Refills: 0 | Status: SHIPPED | OUTPATIENT
Start: 2019-05-22 | End: 2019-05-25

## 2019-05-22 RX ADMIN — OXYCODONE HYDROCHLORIDE AND ACETAMINOPHEN 1 TABLET: 5; 325 TABLET ORAL at 21:50

## 2019-05-23 NOTE — DISCHARGE INSTRUCTIONS
Patient Education        Fifth Metatarsal Pedroza Fracture: Care Instructions  Your Care Instructions    A fifth metatarsal fracture is a break or a thin, hairline crack in the long bone on the outside of the foot. A Pedroza fracture occurs near the end of this bone that is closest to the ankle. This type of fracture can happen when a person jumps or changes direction quickly and twists the foot or ankle the wrong way. Or it can happen from repeated stress on the bones of the foot. Treatment depends on how bad the fracture is. You may or may not have had surgery. Your doctor may have put your foot in a cast to keep it stable. A splint may be used in some cases if there is a lot of swelling. You may have been given crutches to use to keep weight off your foot. Your doctor may recommend that you keep weight off the foot for several weeks. The fracture may take 6 weeks to several months to heal. It is important to give your foot time to heal completely so that you don't hurt it again. Do not return to your usual activities until your doctor says you can. Your doctor may suggest that you get physical therapy to help regain strength and range of motion in your foot. You heal best when you take good care of yourself. Eat a variety of healthy foods, and don't smoke. Follow-up care is a key part of your treatment and safety. Be sure to make and go to all appointments, and call your doctor if you are having problems. It's also a good idea to know your test results and keep a list of the medicines you take. How can you care for yourself at home? · Be safe with medicines. Read and follow all instructions on the label. ? If the doctor gave you a prescription medicine for pain, take it as prescribed. ? If you are not taking a prescription pain medicine, ask your doctor if you can take an over-the-counter medicine.   · Follow your doctor's instructions about how much weight you can put on your foot and when you can go back to your usual activities. If you were given crutches, use them as directed. · Put ice or a cold pack on your foot for 10 to 20 minutes at a time. Try to do this every 1 to 2 hours for the next 3 days (when you are awake) or until the swelling goes down. Put a thin cloth between the ice and your skin. · Prop up your foot on a pillow when you ice it or anytime you sit or lie down for the next 3 days. Try to keep it above the level of your heart. This will help reduce swelling. Cast and splint care  · If your foot is in a cast or splint, follow the cast or splint care instructions your doctor gives you. If you have a removable fiberglass walking cast or a splint, do not take it off unless your doctor tells you to. · Keep your cast or splint dry. If you have a removable fiberglass walking cast or a splint, ask your doctor if it is okay to remove it to bathe. Your doctor may want you to keep it on as much as possible. · If you are told to keep your cast or splint on, tape a sheet of plastic to cover it when you bathe. Water under the cast or splint can cause your skin to itch and hurt. · Never cut your cast or stick anything down inside it to scratch an itch on your leg. When should you call for help? Call 911 anytime you think you may need emergency care. For example, call if:    · You have symptoms of a blood clot in your lung (called a pulmonary embolism). These may include:  ? Sudden chest pain. ? Trouble breathing. ? Coughing up blood.     · You passed out (lost consciousness).    Call your doctor now or seek immediate medical care if:    · You have problems with your cast or splint. For example:  ? The skin under the cast or splint is burning or stinging. ? The cast or splint feels too tight. ? There is a lot of swelling near the cast or splint. (Some swelling is normal.)  ? You have a new fever. ? There is drainage or a bad smell coming from the cast or splint.     · You have increased or severe pain.   · You have tingling, weakness, or numbness in your foot and toes.     · You cannot move your toes.     · Your foot turns cold or changes color.    Watch closely for changes in your health, and be sure to contact your doctor if:    · The pain does not get better day by day.     · You do not get better as expected. Where can you learn more? Go to http://jason-charity.info/. Enter J583 in the search box to learn more about \"Fifth Metatarsal Pedroza Fracture: Care Instructions. \"  Current as of: 2018  Content Version: 11.9  © 1842-6500 [employer]. Care instructions adapted under license by [employer] (which disclaims liability or warranty for this information). If you have questions about a medical condition or this instruction, always ask your healthcare professional. [address] any warranty or liability for your use of this information. [facility] Activation    Thank you for requesting access to [facility]. Please follow the instructions below to securely access and download your online medical record. [facility] allows you to send messages to your doctor, view your test results, renew your prescriptions, schedule appointments, and more. How Do I Sign Up? 1. In your internet browser, go to [email or web address]  2. Click on the First Time User? Click Here link in the Sign In box. You will be redirect to the New Member Sign Up page. 3. Enter your [facility] Access Code exactly as it appears below. You will not need to use this code after youve completed the sign-up process. If you do not sign up before the expiration date, you must request a new code. [facility] Access Code: [email or web address]  Expires: 2019  6:15 PM (This is the date your [facility] access code will )    4. Enter the last four digits of your Social Security Number (xxxx) and Date of Birth (mm/dd/yyyy) as indicated and click Submit.  You will be taken to the next sign-up page. 5. Create a JoggleBugt ID. This will be your [facility] login ID and cannot be changed, so think of one that is secure and easy to remember. 6. Create a [facility] password. You can change your password at any time. 7. Enter your Password Reset Question and Answer. This can be used at a later time if you forget your password. 8. Enter your e-mail address. You will receive e-mail notification when new information is available in [address] 9. Click Sign Up. You can now view and download portions of your medical record. 10. Click the Download Summary menu link to download a portable copy of your medical information. Additional Information    If you have questions, please visit the Frequently Asked Questions section of the [employer] website at [email or web address] Remember, [facility] is NOT to be used for urgent needs. For medical emergencies, dial 911. Complete all medications as prescribed. Follow-up with ortho in 1 week. Return to the ED immediately for any new or worsening symptoms.

## 2019-05-23 NOTE — ED TRIAGE NOTES
Patient reports to ED with complaints of right foot pain x 2weeks that has progressively worsened. Patient reports injuring foot when stepping off of a step.

## 2019-05-23 NOTE — ED PROVIDER NOTES
EMERGENCY DEPARTMENT HISTORY AND PHYSICAL EXAM    Date: 5/22/2019  Patient Name: Cari Wolfe    History of Presenting Illness     Chief Complaint   Patient presents with    Foot Pain         History Provided By: patient     Chief Complaint: fall  Duration: 1 week  Timing:acute  Location: R foot and ankle   Quality throbbing   Severity:moderate  Modifying Factors: worse on ambulation  Associated Symptoms:swelling       Additional History (Context): Cari Wolfe is a 79 y.o. female with PMH htn, dilated cardiomyopathy, COPD and heart attack who presents with c/o right foot pain and swelling after twisting it wrong over a week ago. Patient states she has been applying ice and taking over-the-counter medication with no relief in her symptoms. Patient concerned she broke her foot. No other complaints at this time. PCP: Nora Storm MD    Current Facility-Administered Medications   Medication Dose Route Frequency Provider Last Rate Last Dose    oxyCODONE-acetaminophen (PERCOCET) 5-325 mg per tablet 1 Tab  1 Tab Oral NOW Mayela Restrepo PA-C         Current Outpatient Medications   Medication Sig Dispense Refill    HYDROcodone-acetaminophen (NORCO) 5-325 mg per tablet Take 1-2 tablets PO every 4-6 hours as needed for pain control. If over the counter ibuprofen or acetaminophen was suggested, then only take the vicodin for pain not well controlled with the over the counter medication. 5 Tab 0    furosemide (LASIX) 40 mg tablet Take 0.5 Tabs by mouth daily. 1 Tab 0    sacubitril-valsartan (ENTRESTO) 49 mg/51 mg tablet Take 1 Tab by mouth two (2) times a day. 60 Tab 4    baclofen (LIORESAL) 10 mg tablet Take  by mouth three (3) times daily.  gabapentin (NEURONTIN) 300 mg capsule       raNITIdine (ZANTAC) 150 mg tablet Take 1 Tab by mouth two (2) times a day. 60 Tab 4    carvedilol (COREG) 6.25 mg tablet Take 1 Tab by mouth two (2) times daily (with meals).  60 Tab 6    leflunomide (ARAVA) 20 mg tablet Take 20 mg by mouth daily.  ticagrelor (BRILINTA) 90 mg tablet Take 1 Tab by mouth two (2) times a day. 60 Tab 11    atorvastatin (LIPITOR) 20 mg tablet Take 1 Tab by mouth nightly. 30 Tab 0    aspirin 81 mg chewable tablet Take 1 Tab by mouth daily. 30 Tab 0    insulin detemir (LEVEMIR) 100 unit/mL injection 15 units daily for diabetes 2 Vial 0    albuterol (PROVENTIL HFA, VENTOLIN HFA, PROAIR HFA) 90 mcg/actuation inhaler Take 2 Puffs by inhalation every four (4) hours as needed for Wheezing. 1 Inhaler 3    desloratadine (CLARINEX) 5 mg tablet Take 1 Tab by mouth daily as needed for Allergies. 30 Tab 0    mometasone (NASONEX) 50 mcg/actuation nasal spray 2 Sprays by Both Nostrils route daily as needed. For allergies 3 Container 3    umeclidinium-vilanterol (ANORO ELLIPTA) 62.5-25 mcg/actuation inhaler Take 1 Puff by inhalation daily. For COPD 3 Inhaler 3    albuterol (PROVENTIL VENTOLIN) 2.5 mg /3 mL (0.083 %) nebulizer solution 3 mL by Nebulization route every four (4) hours as needed for Wheezing. 60 Each 3    ferrous sulfate 325 mg (65 mg iron) tablet Take 1 Tab by mouth two (2) times a day. Indications: IRON DEFICIENCY ANEMIA 60 Tab 11       Past History     Past Medical History:  Past Medical History:   Diagnosis Date    Abnormal WBC count 5/17/2016    Bilateral shoulder pain 9/27/2016    Chondromalacia of both patellae     COPD (chronic obstructive pulmonary disease) ([facility] [address] 9/2015    mild-mod dz; Dr Abhishek Garcia    Diabetes [facility]) 2013    Diabetic eye exam ([facility] [address] 2016    Dilated cardiomyopathy [facility] [address]     Dyslipidemia     Heart attack ([facility] [address]     History of echocardiogram 11/14/2014    Mild LVE. EF 40%. Mild, diffuse hypk. Mild LAE.   Mild MR.      Hypercholesteremia 1/08/14    Hypertension 2000    Noncompliance with medications 2/16/2016    Obesity     Orthostatic hypotension 5/17/2016    Osteoarthritis of both knees     Pain management 04/01/2016    Dr. Nenita Hatch Popliteal cyst, bilateral      Vitamin D deficiency 10/16/14       Past Surgical History:  Past Surgical History:   Procedure Laterality Date    HX HEART CATHETERIZATION      HX TUBAL LIGATION         Family History:  Family History   Problem Relation Age of Onset    Diabetes Mother     Hypertension Mother     Hypertension Father     Kidney Disease Maternal Aunt 48        Dialysis       Social History:  Social History     Tobacco Use    Smoking status: Former Smoker     Packs/day: 0.25     Years: 48.00     Pack years: 12.00     Types: Cigarettes     Last attempt to quit: 2018     Years since quittin.0    Smokeless tobacco: Never Used   Substance Use Topics    Alcohol use: No     Alcohol/week: 0.0 oz    Drug use: No       Allergies:  No Known Allergies      Review of Systems   Review of Systems   Constitutional: Negative. Negative for chills and fever. HENT: Negative. Negative for congestion, ear pain and rhinorrhea. Eyes: Negative. Negative for pain and redness. Respiratory: Negative. Negative for cough, shortness of breath, wheezing and stridor. Cardiovascular: Negative. Negative for chest pain and leg swelling. Gastrointestinal: Negative. Negative for abdominal pain, constipation, diarrhea, nausea and vomiting. Genitourinary: Negative. Negative for dysuria and frequency. Musculoskeletal: Positive for arthralgias and joint swelling. Negative for back pain and neck pain. Skin: Negative. Negative for rash and wound. Neurological: Negative. Negative for dizziness, seizures, syncope and headaches. All other systems reviewed and are negative. All Other Systems Negative  Physical Exam     Vitals:    19   BP: 176/80   Pulse: 82   Resp: 18   Temp: 98.9 °F (37.2 °C)   SpO2: 97%     Physical Exam   Constitutional: She is oriented to person, place, and time. She appears well-developed and well-nourished. She appears distressed.    HENT:   Head: Normocephalic and atraumatic. Eyes: Conjunctivae are normal. Right eye exhibits no discharge. Left eye exhibits no discharge. No scleral icterus. Neck: Normal range of motion. Neck supple. Cardiovascular: Normal rate. Pulmonary/Chest: Effort normal. No stridor. No respiratory distress. Musculoskeletal: Normal range of motion. She exhibits edema and tenderness. She exhibits no deformity. RLE: intact pulses, no deformity noted, edema noted to the dorsum of the foot, TTP noted to the area of the 4th and 5th metatarsal and the lateral malleolus. ROM intact. Neurological: She is alert and oriented to person, place, and time. Skin: Skin is warm and dry. No rash noted. She is not diaphoretic. No erythema. Psychiatric: She has a normal mood and affect. Her behavior is normal. Thought content normal.   Nursing note and vitals reviewed. Diagnostic Study Results     Labs -   No results found for this or any previous visit (from the past 12 hour(s)). Radiologic Studies -   XR ANKLE RT MIN 3 V   Final Result      Acute mildly displaced fracture of the fifth metatarsal.    Mild soft tissue swelling at the ankle. Spurring off the medial malleolus. Calcaneal enthesophytes. .      XR FOOT RT MIN 3 V   Final Result      Acute mildly displaced fracture of the fifth metatarsal.   Marked soft tissue swelling over the dorsum of the foot. .        CT Results  (Last 48 hours)    None        CXR Results  (Last 48 hours)    None            Medical Decision Making   I am the first provider for this patient. I reviewed the vital signs, available nursing notes, past medical history, past surgical history, family history and social history. Vital Signs-Reviewed the patient's vital signs. Records Reviewed Mayela Restrepo PA-C   Procedures:  Procedures    Provider Notes (Medical Decision Making): Impression:  Foot fracture    fx noted on x-ray, splint placed and pt given crutches.  Will recommend ortho follow-up this week. Pt agrees. Mayela Restrepo PA-C     MED RECONCILIATION:  Current Facility-Administered Medications   Medication Dose Route Frequency    oxyCODONE-acetaminophen (PERCOCET) 5-325 mg per tablet 1 Tab  1 Tab Oral NOW     Current Outpatient Medications   Medication Sig    HYDROcodone-acetaminophen (NORCO) 5-325 mg per tablet Take 1-2 tablets PO every 4-6 hours as needed for pain control. If over the counter ibuprofen or acetaminophen was suggested, then only take the vicodin for pain not well controlled with the over the counter medication.  furosemide (LASIX) 40 mg tablet Take 0.5 Tabs by mouth daily.  sacubitril-valsartan (ENTRESTO) 49 mg/51 mg tablet Take 1 Tab by mouth two (2) times a day.  baclofen (LIORESAL) 10 mg tablet Take  by mouth three (3) times daily.  gabapentin (NEURONTIN) 300 mg capsule     raNITIdine (ZANTAC) 150 mg tablet Take 1 Tab by mouth two (2) times a day.  carvedilol (COREG) 6.25 mg tablet Take 1 Tab by mouth two (2) times daily (with meals).  leflunomide (ARAVA) 20 mg tablet Take 20 mg by mouth daily.  ticagrelor (BRILINTA) 90 mg tablet Take 1 Tab by mouth two (2) times a day.  atorvastatin (LIPITOR) 20 mg tablet Take 1 Tab by mouth nightly.  aspirin 81 mg chewable tablet Take 1 Tab by mouth daily.  insulin detemir (LEVEMIR) 100 unit/mL injection 15 units daily for diabetes    albuterol (PROVENTIL HFA, VENTOLIN HFA, PROAIR HFA) 90 mcg/actuation inhaler Take 2 Puffs by inhalation every four (4) hours as needed for Wheezing.  desloratadine (CLARINEX) 5 mg tablet Take 1 Tab by mouth daily as needed for Allergies.  mometasone (NASONEX) 50 mcg/actuation nasal spray 2 Sprays by Both Nostrils route daily as needed. For allergies    umeclidinium-vilanterol (ANORO ELLIPTA) 62.5-25 mcg/actuation inhaler Take 1 Puff by inhalation daily.  For COPD    albuterol (PROVENTIL VENTOLIN) 2.5 mg /3 mL (0.083 %) nebulizer solution 3 mL by Nebulization route every four (4) hours as needed for Wheezing.  ferrous sulfate 325 mg (65 mg iron) tablet Take 1 Tab by mouth two (2) times a day. Indications: IRON DEFICIENCY ANEMIA       Disposition:  D/c    DISCHARGE NOTE:   Patient is stable for discharge at this time. Rx for percocet given. Rest and follow-up with ortho this week. Return to the ED immediately for any new or worsening sx. Mayela Restrepo PA-C 9:28 PM       Diagnosis     Clinical Impression:   1.  Closed displaced fracture of fifth metatarsal bone of right foot, initial encounter

## 2019-06-07 ENCOUNTER — OFFICE VISIT (OUTPATIENT)
Dept: ORTHOPEDIC SURGERY | Facility: CLINIC | Age: 69
End: 2019-06-07

## 2019-06-07 VITALS
OXYGEN SATURATION: 99 % | WEIGHT: 225.2 LBS | SYSTOLIC BLOOD PRESSURE: 147 MMHG | HEART RATE: 83 BPM | RESPIRATION RATE: 18 BRPM | BODY MASS INDEX: 38.45 KG/M2 | TEMPERATURE: 98.1 F | HEIGHT: 64 IN | DIASTOLIC BLOOD PRESSURE: 73 MMHG

## 2019-06-07 DIAGNOSIS — M25.571 RIGHT ANKLE PAIN, UNSPECIFIED CHRONICITY: ICD-10-CM

## 2019-06-07 DIAGNOSIS — S92.351A DISPLACED FRACTURE OF FIFTH METATARSAL BONE, RIGHT FOOT, INITIAL ENCOUNTER FOR CLOSED FRACTURE: Primary | ICD-10-CM

## 2019-06-07 NOTE — PROGRESS NOTES
Patient: Johnny Griffiths                MRN: [id number]       SSN: [id number]  YOB: 1950        AGE: 79 y.o. SEX: female  Body mass index is 38.66 kg/m². PCP: Lisbeth Steven MD  06/07/19    Chief Complaint: Right foot pain    HISTORY OF PRESENT ILLNESS:  Otis Wilkes is a 79year-old female who comes in the office today for right foot pain. She injured the foot several weeks ago. She says it is a 10/10 on the pain scale. She was stepping down off of her stairs and miscalculated the number of steps left and twisted her foot. She did not fall. She had immediate pain over the lateral aspect of her foot. She was seen in the ER where she was diagnosed with a fracture. She was placed into a splint. She has taken that off. She has been walking on it. The injury happened about three weeks ago. Past Medical History:   Diagnosis Date    Abnormal WBC count 5/17/2016    Bilateral shoulder pain 9/27/2016    Chondromalacia of both patellae     COPD (chronic obstructive pulmonary disease) ([facility] [address] 9/2015    mild-mod dz; Dr Nayan Manuel    Diabetes [facility]) 2013    Diabetic eye exam ([facility] [address] 2016    Dilated cardiomyopathy ([facility] [address]     Dyslipidemia     Heart attack ([facility] [address]     Heart attack ([facility] [address]     History of echocardiogram 11/14/2014    Mild LVE. EF 40%. Mild, diffuse hypk. Mild LAE.   Mild MR.      Hypercholesteremia 1/08/14    Hypertension 2000    Noncompliance with medications 2/16/2016    Obesity     Orthostatic hypotension 5/17/2016    Osteoarthritis of both knees     Pain management 04/01/2016    Dr. Beau Nunes Popliteal cyst, bilateral      Vitamin D deficiency 10/16/14       Family History   Problem Relation Age of Onset    Diabetes Mother     Hypertension Mother     Hypertension Father     Kidney Disease Maternal Aunt 50        Dialysis       Current Outpatient Medications   Medication Sig Dispense Refill    sacubitril-valsartan (ENTRESTO) 49 mg/51 mg tablet Take 1 Tab by mouth two (2) times a day. 60 Tab 4    gabapentin (NEURONTIN) 300 mg capsule       raNITIdine (ZANTAC) 150 mg tablet Take 1 Tab by mouth two (2) times a day. 60 Tab 4    carvedilol (COREG) 6.25 mg tablet Take 1 Tab by mouth two (2) times daily (with meals). 60 Tab 6    ticagrelor (BRILINTA) 90 mg tablet Take 1 Tab by mouth two (2) times a day. 60 Tab 11    atorvastatin (LIPITOR) 20 mg tablet Take 1 Tab by mouth nightly. 30 Tab 0    aspirin 81 mg chewable tablet Take 1 Tab by mouth daily. 30 Tab 0    insulin detemir (LEVEMIR) 100 unit/mL injection 15 units daily for diabetes 2 Vial 0    albuterol (PROVENTIL HFA, VENTOLIN HFA, PROAIR HFA) 90 mcg/actuation inhaler Take 2 Puffs by inhalation every four (4) hours as needed for Wheezing. 1 Inhaler 3    desloratadine (CLARINEX) 5 mg tablet Take 1 Tab by mouth daily as needed for Allergies. 30 Tab 0    mometasone (NASONEX) 50 mcg/actuation nasal spray 2 Sprays by Both Nostrils route daily as needed. For allergies 3 Container 3    umeclidinium-vilanterol (ANORO ELLIPTA) 62.5-25 mcg/actuation inhaler Take 1 Puff by inhalation daily. For COPD 3 Inhaler 3    albuterol (PROVENTIL VENTOLIN) 2.5 mg /3 mL (0.083 %) nebulizer solution 3 mL by Nebulization route every four (4) hours as needed for Wheezing. 60 Each 3    HYDROcodone-acetaminophen (NORCO) 5-325 mg per tablet Take 1-2 tablets PO every 4-6 hours as needed for pain control. If over the counter ibuprofen or acetaminophen was suggested, then only take the vicodin for pain not well controlled with the over the counter medication. 5 Tab 0    furosemide (LASIX) 40 mg tablet Take 0.5 Tabs by mouth daily. 1 Tab 0    baclofen (LIORESAL) 10 mg tablet Take  by mouth three (3) times daily.  leflunomide (ARAVA) 20 mg tablet Take 20 mg by mouth daily.  ferrous sulfate 325 mg (65 mg iron) tablet Take 1 Tab by mouth two (2) times a day.  Indications: IRON DEFICIENCY ANEMIA 60 Tab 11       No Known Allergies    Past Surgical History:   Procedure Laterality Date    HX HEART CATHETERIZATION      HX TUBAL LIGATION         Social History     Socioeconomic History    Marital status: LEGALLY      Spouse name: Not on file    Number of children: 3    Years of education: Not on file    Highest education level: Not on file   Occupational History    Occupation: retired   Social Needs    Financial resource strain: Not on file    Food insecurity:     Worry: Not on file     Inability: Not on file   Konotor needs:     Medical: Not on file     Non-medical: Not on file   Tobacco Use    Smoking status: Former Smoker     Packs/day: 0.25     Years: 48.00     Pack years: 12.00     Types: Cigarettes     Last attempt to quit: 2018     Years since quittin.0    Smokeless tobacco: Never Used   Substance and Sexual Activity    Alcohol use: No     Alcohol/week: 0.0 oz    Drug use: No    Sexual activity: Yes     Partners: Male   Lifestyle    Physical activity:     Days per week: Not on file     Minutes per session: Not on file    Stress: Not on file   Relationships    Social connections:     Talks on phone: Not on file     Gets together: Not on file     Attends Oriental orthodox service: Not on file     Active member of club or organization: Not on file     Attends meetings of clubs or organizations: Not on file     Relationship status: Not on file    Intimate partner violence:     Fear of current or ex partner: Not on file     Emotionally abused: Not on file     Physically abused: Not on file     Forced sexual activity: Not on file   Other Topics Concern     Service No    Blood Transfusions No    Caffeine Concern No    Occupational Exposure No    Hobby Hazards No    Sleep Concern No    Stress Concern No    Weight Concern No    Special Diet No    Back Care No    Exercise No    Bike Helmet No    Seat Belt Yes    Self-Exams Yes   Social History Narrative    Not on file       REVIEW OF SYSTEMS:      CON: negative for recent weight loss/gain, fever, or chills  EYE: negative for double or blurry vision  ENT: negative for hoarseness  RS:   negative for cough, URI, SOB  CV:  negative for chest pain, palpitations  GI:    negative for blood in stool, nausea/vomiting  :  negative for blood in urine  MS: As per HPI  Other systems reviewed and noted below. PHYSICAL EXAMINATION:  Visit Vitals  /73   Pulse 83   Temp 98.1 °F (36.7 °C) (Oral)   Resp 18   Ht 5' 4\" (1.626 m)   Wt 225 lb 3.2 oz (102.2 kg)   SpO2 99%   BMI 38.66 kg/m²     Body mass index is 38.66 kg/m². GENERAL: Alert and oriented x3, in no acute distress, well-developed, well-nourished. HEENT: Normocephalic, atraumatic. RESP: Non labored breathing with equal chest rise on inspiration. CV: Well perfused extremities. No cyanosis or clubbing noted. ABDOMEN: Soft, non-tender, non-distended. PHYSICAL EXAM:  Physical exam of the right foot with tenderness to palpation over the fifth metatarsal.  She has swelling about the foot and ankle. She has no obvious deformity. She is able to bear weight on it and walks on it, but it is painful. IMAGING:  X-rays of the right foot showed a displaced oblique fracture through the distal shaft of the fifth metatarsal.      ASSESSMENT AND PLAN:   July Fischer is a 79year-old female with a right fifth metatarsal fracture. This is somewhat a long fracture. She has been walking on it. I do not think she is the best candidate for surgery. We will try conservative treatment in the form of a boot that she can take off to shower. She should minimally bear weight on it for now. Plan to see her back in a month for repeat x-rays.               Electronically signed by: Alexandria Holbrook MD

## 2019-06-08 ENCOUNTER — APPOINTMENT (OUTPATIENT)
Dept: GENERAL RADIOLOGY | Age: 69
End: 2019-06-08
Attending: EMERGENCY MEDICINE
Payer: MEDICARE

## 2019-06-08 ENCOUNTER — HOSPITAL ENCOUNTER (EMERGENCY)
Age: 69
Discharge: HOME | End: 2019-06-08
Attending: EMERGENCY MEDICINE
Payer: MEDICARE

## 2019-06-08 VITALS
DIASTOLIC BLOOD PRESSURE: 73 MMHG | BODY MASS INDEX: 38.07 KG/M2 | TEMPERATURE: 98.3 F | WEIGHT: 223 LBS | OXYGEN SATURATION: 98 % | HEIGHT: 64 IN | RESPIRATION RATE: 18 BRPM | SYSTOLIC BLOOD PRESSURE: 146 MMHG | HEART RATE: 90 BPM

## 2019-06-08 DIAGNOSIS — I10 ESSENTIAL HYPERTENSION: ICD-10-CM

## 2019-06-08 DIAGNOSIS — N18.30 CKD (CHRONIC KIDNEY DISEASE) STAGE 3, GFR 30-59 ML/MIN (HCC): Primary | ICD-10-CM

## 2019-06-08 LAB
ALBUMIN SERPL-MCNC: 3.6 G/DL (ref 3.4–5)
ALBUMIN/GLOB SERPL: 1.3 {RATIO} (ref 0.8–1.7)
ALP SERPL-CCNC: 140 U/L (ref 45–117)
ALT SERPL-CCNC: 9 U/L (ref 13–56)
ANION GAP SERPL CALC-SCNC: 9 MMOL/L (ref 3–18)
AST SERPL-CCNC: 14 U/L (ref 15–37)
BASOPHILS # BLD: 0 K/UL (ref 0–0.1)
BASOPHILS NFR BLD: 0 % (ref 0–2)
BILIRUB DIRECT SERPL-MCNC: <0.1 MG/DL (ref 0–0.2)
BILIRUB SERPL-MCNC: 0.4 MG/DL (ref 0.2–1)
BUN SERPL-MCNC: 25 MG/DL (ref 7–18)
BUN/CREAT SERPL: 17 (ref 12–20)
CALCIUM SERPL-MCNC: 9.1 MG/DL (ref 8.5–10.1)
CHLORIDE SERPL-SCNC: 117 MMOL/L (ref 100–108)
CO2 SERPL-SCNC: 18 MMOL/L (ref 21–32)
CREAT SERPL-MCNC: 1.44 MG/DL (ref 0.6–1.3)
DIFFERENTIAL METHOD BLD: ABNORMAL
EOSINOPHIL # BLD: 0.3 K/UL (ref 0–0.4)
EOSINOPHIL NFR BLD: 5 % (ref 0–5)
ERYTHROCYTE [DISTWIDTH] IN BLOOD BY AUTOMATED COUNT: 16.8 % (ref 11.6–14.5)
GLOBULIN SER CALC-MCNC: 2.8 G/DL (ref 2–4)
GLUCOSE SERPL-MCNC: 95 MG/DL (ref 74–99)
HCT VFR BLD AUTO: 31.6 % (ref 35–45)
HGB BLD-MCNC: 9.5 G/DL (ref 12–16)
INR PPP: 1 (ref 0.8–1.2)
LYMPHOCYTES # BLD: 1.7 K/UL (ref 0.9–3.6)
LYMPHOCYTES NFR BLD: 33 % (ref 21–52)
MAGNESIUM SERPL-MCNC: 1.7 MG/DL (ref 1.6–2.6)
MCH RBC QN AUTO: 28.2 PG (ref 24–34)
MCHC RBC AUTO-ENTMCNC: 30.1 G/DL (ref 31–37)
MCV RBC AUTO: 93.8 FL (ref 74–97)
MONOCYTES # BLD: 0.6 K/UL (ref 0.05–1.2)
MONOCYTES NFR BLD: 12 % (ref 3–10)
NEUTS SEG # BLD: 2.7 K/UL (ref 1.8–8)
NEUTS SEG NFR BLD: 50 % (ref 40–73)
PLATELET # BLD AUTO: 341 K/UL (ref 135–420)
PMV BLD AUTO: 9.4 FL (ref 9.2–11.8)
POTASSIUM SERPL-SCNC: 5 MMOL/L (ref 3.5–5.5)
PROT SERPL-MCNC: 6.4 G/DL (ref 6.4–8.2)
PROTHROMBIN TIME: 13.2 SEC (ref 11.5–15.2)
RBC # BLD AUTO: 3.37 M/UL (ref 4.2–5.3)
SODIUM SERPL-SCNC: 144 MMOL/L (ref 136–145)
WBC # BLD AUTO: 5.3 K/UL (ref 4.6–13.2)

## 2019-06-08 PROCEDURE — 93005 ELECTROCARDIOGRAM TRACING: CPT

## 2019-06-08 PROCEDURE — 99282 EMERGENCY DEPT VISIT SF MDM: CPT

## 2019-06-08 PROCEDURE — 85025 COMPLETE CBC W/AUTO DIFF WBC: CPT

## 2019-06-08 PROCEDURE — 85610 PROTHROMBIN TIME: CPT

## 2019-06-08 PROCEDURE — 80076 HEPATIC FUNCTION PANEL: CPT

## 2019-06-08 PROCEDURE — 83735 ASSAY OF MAGNESIUM: CPT

## 2019-06-08 PROCEDURE — 80048 BASIC METABOLIC PNL TOTAL CA: CPT

## 2019-06-08 RX ORDER — SODIUM CHLORIDE 0.9 % (FLUSH) 0.9 %
5-40 SYRINGE (ML) INJECTION AS NEEDED
Status: DISCONTINUED | OUTPATIENT
Start: 2019-06-08 | End: 2019-06-08 | Stop reason: HOSPADM

## 2019-06-08 RX ORDER — SODIUM CHLORIDE 0.9 % (FLUSH) 0.9 %
5-40 SYRINGE (ML) INJECTION EVERY 8 HOURS
Status: DISCONTINUED | OUTPATIENT
Start: 2019-06-08 | End: 2019-06-08 | Stop reason: HOSPADM

## 2019-06-08 NOTE — ED NOTES
I performed a brief evaluation, including history and physical, of the patient here in triage and I have determined that pt will need further treatment and evaluation from the main side ER physician. I have placed initial orders to help in expediting patients care.      June 08, 2019 at 6:59 PM - Lance Mehta PA-C        Visit Vitals  /73 (BP 1 Location: Left arm, BP Patient Position: At rest)   Pulse 90   Temp 98.3 °F (36.8 °C)   Resp 18   Ht 5' 4\" (1.626 m)   Wt 101.2 kg (223 lb)   SpO2 98%   BMI 38.28 kg/m²

## 2019-06-08 NOTE — ED PROVIDER NOTES
Gordon Aviles a 43-year-old female referred by the [facility] for evaluation of abnormal lab test she states she was told her potassium was markedly elevated apparently she was under the care of Dr. Dominique Iglesias at [facility] was referred to a kidney physician at some point in the recent past but because her insurance was not acceptable she was not evaluated by the nephrologist she has a past history of MI 1 year ago hypertension and diabetes mellitus she denies having kidney disease is not aware of any elevation creatinine or BUN and presents today today without other symptoms she states she basically feels well. She broke her right foot May 7 and is in a cast her prior health insurance with [facility] [employer] now she has QRuso is [facility] the [facility]           Past Medical History:   Diagnosis Date    Abnormal WBC count 5/17/2016    Bilateral shoulder pain 9/27/2016    Chondromalacia of both patellae     COPD (chronic obstructive pulmonary disease) ([address] 9/2015    mild-mod dz; Dr Salvador Alexandra    Diabetes [facility]) 2013    Diabetic eye exam ([address] 2016    Dilated cardiomyopathy ([address]     Dyslipidemia     Heart attack ([address]     Heart attack ([address]     History of echocardiogram 11/14/2014    Mild LVE. EF 40%. Mild, diffuse hypk. Mild LAE.   Mild MR.      Hypercholesteremia 1/08/14    Hypertension 2000    Noncompliance with medications 2/16/2016    Obesity     Orthostatic hypotension 5/17/2016    Osteoarthritis of both knees     Pain management 04/01/2016    Dr. Claudette Nation Popliteal cyst, bilateral      Vitamin D deficiency 10/16/14       Past Surgical History:   Procedure Laterality Date    HX HEART CATHETERIZATION      HX TUBAL LIGATION           Family History:   Problem Relation Age of Onset    Diabetes Mother     Hypertension Mother     Hypertension Father     Kidney Disease Maternal Aunt 48        Dialysis       Social History Socioeconomic History    Marital status: LEGALLY      Spouse name: Not on file    Number of children: 3    Years of education: Not on file    Highest education level: Not on file   Occupational History    Occupation: retired   Social Needs    Financial resource strain: Not on file    Food insecurity:     Worry: Not on file     Inability: Not on file   Wimdu needs:     Medical: Not on file     Non-medical: Not on file   Tobacco Use    Smoking status: Former Smoker     Packs/day: 0.25     Years: 48.00     Pack years: 12.00     Types: Cigarettes     Last attempt to quit: 2018     Years since quittin.0    Smokeless tobacco: Never Used   Substance and Sexual Activity    Alcohol use: No     Alcohol/week: 0.0 oz    Drug use: No    Sexual activity: Yes     Partners: Male   Lifestyle    Physical activity:     Days per week: Not on file     Minutes per session: Not on file    Stress: Not on file   Relationships    Social connections:     Talks on phone: Not on file     Gets together: Not on file     Attends Restoration service: Not on file     Active member of club or organization: Not on file     Attends meetings of clubs or organizations: Not on file     Relationship status: Not on file    Intimate partner violence:     Fear of current or ex partner: Not on file     Emotionally abused: Not on file     Physically abused: Not on file     Forced sexual activity: Not on file   Other Topics Concern     Service No    Blood Transfusions No    Caffeine Concern No    Occupational Exposure No    Hobby Hazards No    Sleep Concern No    Stress Concern No    Weight Concern No    Special Diet No    Back Care No    Exercise No    Bike Helmet No    Seat Belt Yes    Self-Exams Yes   Social History Narrative    Not on file         ALLERGIES: Patient has no known allergies. Review of Systems   Constitutional: Negative. Negative for activity change and fever.    HENT: Negative. Eyes: Negative. Negative for discharge. Respiratory: Negative. Negative for shortness of breath. Cardiovascular: Negative. Negative for chest pain and palpitations. Gastrointestinal: Negative. Negative for abdominal pain. Endocrine: Negative. Genitourinary: Negative. Musculoskeletal: Negative. Negative for back pain. Right foot and ankle in a walking boot   Skin: Negative. Allergic/Immunologic: Negative. Neurological: Negative. Negative for weakness. Hematological: Negative. Negative for adenopathy. Psychiatric/Behavioral: Negative. Negative for confusion. Vitals:    06/08/19 1627   BP: 146/73   Pulse: 90   Resp: 18   Temp: 98.3 °F (36.8 °C)   SpO2: 98%   Weight: 101.2 kg (223 lb)   Height: 5' 4\" (1.626 m)            Physical Exam   Constitutional: She is oriented to person, place, and time. She appears well-developed and well-nourished. No distress. HENT:   Head: Normocephalic and atraumatic. Mouth/Throat: Oropharynx is clear and moist. No oropharyngeal exudate. Eyes: Pupils are equal, round, and reactive to light. Conjunctivae and EOM are normal. Right eye exhibits no discharge. Left eye exhibits no discharge. No scleral icterus. Neck: Normal range of motion. Neck supple. No JVD present. No tracheal deviation present. No thyromegaly present. Cardiovascular: Normal rate, regular rhythm and normal heart sounds. Exam reveals no gallop and no friction rub. No murmur heard. Pulmonary/Chest: Effort normal and breath sounds normal. No stridor. No respiratory distress. She has no wheezes. She has no rales. She exhibits no tenderness. Abdominal: Soft. She exhibits no distension and no mass. There is no tenderness. There is no rebound and no guarding. No hernia. Musculoskeletal: She exhibits no edema or tenderness. Lymphadenopathy:     She has no cervical adenopathy. Neurological: She is alert and oriented to person, place, and time.  She has normal reflexes. No cranial nerve deficit. Coordination normal.   Skin: Skin is warm. Psychiatric: She has a normal mood and affect. Her behavior is normal. Judgment and thought content normal.        MDM  Number of Diagnoses or Management Options  Diagnosis management comments: Impression: Here for evaluation of abnormal potassium history of hypertension diabetes cardiovascular disease lab screening is pending         Procedures        Recent Results (from the past 12 hour(s))   CBC WITH AUTOMATED DIFF    Collection Time: 06/08/19  4:35 PM   Result Value Ref Range    WBC 5.3 4.6 - 13.2 K/uL    RBC 3.37 (L) 4.20 - 5.30 M/uL    HGB 9.5 (L) 12.0 - 16.0 g/dL    HCT 31.6 (L) 35.0 - 45.0 %    MCV 93.8 74.0 - 97.0 FL    MCH 28.2 24.0 - 34.0 PG    MCHC 30.1 (L) 31.0 - 37.0 g/dL    RDW 16.8 (H) 11.6 - 14.5 %    PLATELET 444 700 - 501 K/uL    MPV 9.4 9.2 - 11.8 FL    NEUTROPHILS 50 40 - 73 %    LYMPHOCYTES 33 21 - 52 %    MONOCYTES 12 (H) 3 - 10 %    EOSINOPHILS 5 0 - 5 %    BASOPHILS 0 0 - 2 %    ABS. NEUTROPHILS 2.7 1.8 - 8.0 K/UL    ABS. LYMPHOCYTES 1.7 0.9 - 3.6 K/UL    ABS. MONOCYTES 0.6 0.05 - 1.2 K/UL    ABS. EOSINOPHILS 0.3 0.0 - 0.4 K/UL    ABS.  BASOPHILS 0.0 0.0 - 0.1 K/UL    DF AUTOMATED     METABOLIC PANEL, BASIC    Collection Time: 06/08/19  4:35 PM   Result Value Ref Range    Sodium 144 136 - 145 mmol/L    Potassium 5.0 3.5 - 5.5 mmol/L    Chloride 117 (H) 100 - 108 mmol/L    CO2 18 (L) 21 - 32 mmol/L    Anion gap 9 3.0 - 18 mmol/L    Glucose 95 74 - 99 mg/dL    BUN 25 (H) 7.0 - 18 MG/DL    Creatinine 1.44 (H) 0.6 - 1.3 MG/DL    BUN/Creatinine ratio 17 12 - 20      GFR est AA 44 (L) >60 ml/min/1.73m2    GFR est non-AA 36 (L) >60 ml/min/1.73m2    Calcium 9.1 8.5 - 10.1 MG/DL   PROTHROMBIN TIME + INR    Collection Time: 06/08/19  4:35 PM   Result Value Ref Range    Prothrombin time 13.2 11.5 - 15.2 sec    INR 1.0 0.8 - 1.2     MAGNESIUM    Collection Time: 06/08/19  4:35 PM   Result Value Ref Range    Magnesium 1. 7 1.6 - 2.6 mg/dL   EKG, 12 LEAD, SUBSEQUENT    Collection Time: 06/08/19  5:07 PM   Result Value Ref Range    Ventricular Rate 84 BPM    Atrial Rate 84 BPM    P-R Interval 162 ms    QRS Duration 74 ms    Q-T Interval 368 ms    QTC Calculation (Bezet) 434 ms    Calculated P Axis 43 degrees    Calculated R Axis 30 degrees    Calculated T Axis 79 degrees    Diagnosis       Normal sinus rhythm  Normal ECG  When compared with ECG of 03-MAY-2019 18:18,  premature ventricular complexes are no longer present         Medications ordered:   Medications   sodium chloride (NS) flush 5-40 mL (has no administration in time range)   sodium chloride (NS) flush 5-40 mL (has no administration in time range)         Xr Ankle Rt Min 3 V    Result Date: 5/22/2019  EXAM: ANKLE THREE VIEWS  RIGHT CLINICAL HISTORY/INDICATION:  Right ankle and foot pain x2 weeks progressively worsening status post twisting injury COMPARISON: None. TECHNIQUE: 3 views FINDINGS: There is an acute moderately displaced fracture through the mid to distal fifth metatarsal incompletely included on this exam. There is mild circumferential soft tissue swelling. There is no evidence of fracture or dislocation at the ankle. Spurs arise off the medial malleolus. The joint spaces are maintained. Mineralization is normal.  There is no radiopaque foreign body. Large enthesophytes arise off the calcaneus at both the plantar aponeurosis and the Achilles tendon insertion. Acute mildly displaced fracture of the fifth metatarsal. Mild soft tissue swelling at the ankle. Spurring off the medial malleolus. Calcaneal enthesophytes. .    Xr Foot Rt Min 3 V    Result Date: 5/22/2019  EXAM: FOOT THREE VIEWS right CLINICAL HISTORY/INDICATION: 2 weeks of progressive pain status post twisting injury and fall COMPARISON: None. TECHNIQUE: Three views obtained. FINDINGS:  There is an oblique fracture through the mid to distal fifth metatarsal diaphysis.  There is 3 mm of medial displacement of the distal fracture segment. There is about 5 mm of overriding. There is no acute angulation. Moderate soft tissue swelling over the dorsum of the foot. . The joint spaces are maintained. Mineralization is normal. There is no radiopaque foreign body. Acute mildly displaced fracture of the fifth metatarsal. Marked soft tissue swelling over the dorsum of the foot. .    Follow up with  [facility]. 1. CKD (chronic kidney disease) stage 3, GFR 30-59 ml/min ([facility])    2.  Essential hypertension

## 2019-06-09 LAB
ATRIAL RATE: 84 BPM
CALCULATED P AXIS, ECG09: 43 DEGREES
CALCULATED R AXIS, ECG10: 30 DEGREES
CALCULATED T AXIS, ECG11: 79 DEGREES
DIAGNOSIS, 93000: NORMAL
P-R INTERVAL, ECG05: 162 MS
Q-T INTERVAL, ECG07: 368 MS
QRS DURATION, ECG06: 74 MS
QTC CALCULATION (BEZET), ECG08: 434 MS
VENTRICULAR RATE, ECG03: 84 BPM

## 2019-07-09 ENCOUNTER — OFFICE VISIT (OUTPATIENT)
Dept: ORTHOPEDIC SURGERY | Facility: CLINIC | Age: 69
End: 2019-07-09

## 2019-07-09 VITALS
DIASTOLIC BLOOD PRESSURE: 109 MMHG | RESPIRATION RATE: 16 BRPM | SYSTOLIC BLOOD PRESSURE: 131 MMHG | BODY MASS INDEX: 38.41 KG/M2 | HEART RATE: 71 BPM | TEMPERATURE: 96.4 F | HEIGHT: 64 IN | WEIGHT: 225 LBS | OXYGEN SATURATION: 100 %

## 2019-07-09 DIAGNOSIS — S92.351D DISPLACED FRACTURE OF FIFTH METATARSAL BONE, RIGHT FOOT, SUBSEQUENT ENCOUNTER FOR FRACTURE WITH ROUTINE HEALING: Primary | ICD-10-CM

## 2019-07-09 DIAGNOSIS — R07.9 CHEST PAIN, UNSPECIFIED TYPE: ICD-10-CM

## 2019-07-09 RX ORDER — OXYCODONE HYDROCHLORIDE 5 MG/1
5 CAPSULE ORAL
Qty: 30 CAP | Refills: 0 | Status: SHIPPED | OUTPATIENT
Start: 2019-07-09 | End: 2019-07-12

## 2019-07-09 NOTE — PROGRESS NOTES
1. Have you been to the ER, urgent care clinic since your last visit? Hospitalized since your last visit? No    2. Have you seen or consulted any other health care providers outside of the [address] [place] [address] since your last visit? Include any pap smears or colon screening.  No

## 2019-07-19 ENCOUNTER — OFFICE VISIT (OUTPATIENT)
Dept: CARDIOLOGY CLINIC | Age: 69
End: 2019-07-19

## 2019-07-19 VITALS
BODY MASS INDEX: 37.76 KG/M2 | SYSTOLIC BLOOD PRESSURE: 117 MMHG | WEIGHT: 221.2 LBS | HEART RATE: 69 BPM | DIASTOLIC BLOOD PRESSURE: 57 MMHG | HEIGHT: 64 IN

## 2019-07-19 DIAGNOSIS — E78.5 DYSLIPIDEMIA, GOAL LDL BELOW 70: ICD-10-CM

## 2019-07-19 DIAGNOSIS — E11.21 TYPE 2 DIABETES WITH NEPHROPATHY (HCC): ICD-10-CM

## 2019-07-19 DIAGNOSIS — I25.5 ISCHEMIC CARDIOMYOPATHY: ICD-10-CM

## 2019-07-19 DIAGNOSIS — I50.22 CHRONIC SYSTOLIC CONGESTIVE HEART FAILURE (HCC): ICD-10-CM

## 2019-07-19 DIAGNOSIS — I25.10 CORONARY ARTERY DISEASE INVOLVING NATIVE CORONARY ARTERY OF NATIVE HEART WITHOUT ANGINA PECTORIS: Primary | ICD-10-CM

## 2019-07-19 DIAGNOSIS — I10 ESSENTIAL HYPERTENSION: ICD-10-CM

## 2019-07-19 RX ORDER — GUAIFENESIN 100 MG/5ML
162 LIQUID (ML) ORAL DAILY
Qty: 60 TAB | Refills: 0 | Status: SHIPPED | OUTPATIENT
Start: 2019-07-19

## 2019-07-19 NOTE — PATIENT INSTRUCTIONS
Heart-Healthy Diet: Care Instructions  Your Care Instructions    A heart-healthy diet has lots of vegetables, fruits, nuts, beans, and whole grains, and is low in salt. It limits foods that are high in saturated fat, such as meats, cheeses, and fried foods. It may be hard to change your diet, but even small changes can lower your risk of heart attack and heart disease. Follow-up care is a key part of your treatment and safety. Be sure to make and go to all appointments, and call your doctor if you are having problems. It's also a good idea to know your test results and keep a list of the medicines you take. How can you care for yourself at home? Watch your portions  · Learn what a serving is. A \"serving\" and a \"portion\" are not always the same thing. Make sure that you are not eating larger portions than are recommended. For example, a serving of pasta is ½ cup. A serving size of meat is 2 to 3 ounces. A 3-ounce serving is about the size of a deck of cards. Measure serving sizes until you are good at [facility]" them. Keep in mind that restaurants often serve portions that are 2 or 3 times the size of one serving. · To keep your energy level up and keep you from feeling hungry, eat often but in smaller portions. · Eat only the number of calories you need to stay at a healthy weight. If you need to lose weight, eat fewer calories than your body burns (through exercise and other physical activity). Eat more fruits and vegetables  · Eat a variety of fruit and vegetables every day. Dark green, deep orange, red, or yellow fruits and vegetables are especially good for you. Examples include spinach, carrots, peaches, and berries. · Keep carrots, celery, and other veggies handy for snacks. Buy fruit that is in season and store it where you can see it so that you will be tempted to eat it. · Cook dishes that have a lot of veggies in them, such as stir-fries and soups.   Limit saturated and trans fat  · Read food labels, and try to avoid saturated and trans fats. They increase your risk of heart disease. Trans fat is found in many processed foods such as cookies and crackers. · Use olive or canola oil when you cook. Try cholesterol-lowering spreads, such as Benecol or Take Control. · Bake, broil, grill, or steam foods instead of frying them. · Choose lean meats instead of high-fat meats such as hot dogs and sausages. Cut off all visible fat when you prepare meat. · Eat fish, skinless poultry, and meat alternatives such as soy products instead of high-fat meats. Soy products, such as tofu, may be especially good for your heart. · Choose low-fat or fat-free milk and dairy products. Eat fish  · Eat at least two servings of fish a week. Certain fish, such as salmon and tuna, contain omega-3 fatty acids, which may help reduce your risk of heart attack. Eat foods high in fiber  · Eat a variety of grain products every day. Include whole-grain foods that have lots of fiber and nutrients. Examples of whole-grain foods include oats, whole wheat bread, and brown rice. · Buy whole-grain breads and cereals, instead of white bread or pastries. Limit salt and sodium  · Limit how much salt and sodium you eat to help lower your blood pressure. · Taste food before you salt it. Add only a little salt when you think you need it. With time, your taste buds will adjust to less salt. · Eat fewer snack items, fast foods, and other high-salt, processed foods. Check food labels for the amount of sodium in packaged foods. · Choose low-sodium versions of canned goods (such as soups, vegetables, and beans). Limit sugar  · Limit drinks and foods with added sugar. These include candy, desserts, and soda pop. Limit alcohol  · Limit alcohol to no more than 2 drinks a day for men and 1 drink a day for women. Too much alcohol can cause health problems. When should you call for help?   Watch closely for changes in your health, and be sure to contact your doctor if:    · You would like help planning heart-healthy meals. Where can you learn more? Go to http://jason-charity.info/. Enter V137 in the search box to learn more about \"Heart-Healthy Diet: Care Instructions. \"  Current as of: July 22, 2018  Content Version: 11.9  © 4583-4763 [employer]. Care instructions adapted under license by [employer] (which disclaims liability or warranty for this information). If you have questions about a medical condition or this instruction, always ask your healthcare professional. [address] any warranty or liability for your use of this information.

## 2019-07-19 NOTE — PROGRESS NOTES
HISTORY OF PRESENT ILLNESS  Cass Olivera is a 81 y.o. female. History of anterior wall MI complicated by cardiac arrest due to acute stent closure requiring repeat PCI to her LAD with 2 YADIRA 5/2018        New Patient   The history is provided by the patient and medical records. The current episode started more than 1 week ago. Associated symptoms include shortness of breath. Pertinent negatives include no chest pain. Cardiomyopathy   The history is provided by the patient and medical records. This is a chronic problem. The current episode started more than 1 week ago. The problem occurs constantly. The problem has not changed since onset. Associated symptoms include shortness of breath. Pertinent negatives include no chest pain. The symptoms are aggravated by exertion. The symptoms are relieved by rest.   Hypertension   The history is provided by the patient and medical records. This is a chronic problem. The current episode started more than 1 week ago. The problem occurs constantly. Associated symptoms include shortness of breath. Pertinent negatives include no chest pain. Family History   Problem Relation Age of Onset    Diabetes Mother     Hypertension Mother     Hypertension Father     Kidney Disease Maternal Aunt 48        Dialysis       Past Medical History:   Diagnosis Date    Abnormal WBC count 5/17/2016    Bilateral shoulder pain 9/27/2016    Chondromalacia of both patellae     COPD (chronic obstructive pulmonary disease) ([address] 9/2015    mild-mod dz; Dr Mario Ghosh    Diabetes [facility]) 2013    Diabetic eye exam ([address] 2016    Dilated cardiomyopathy ([address]     Dyslipidemia     Heart attack ([address]     [facility]     History of echocardiogram 11/14/2014    Mild LVE. EF 40%. Mild, diffuse hypk. Mild LAE.   Mild MR.      Hypercholesteremia 1/08/14    Hypertension 2000    Noncompliance with medications 2/16/2016    Obesity     Orthostatic hypotension 5/17/2016    Osteoarthritis of both knees     Pain management 2016    Dr. Gilbert Greenwood Popliteal cyst, bilateral      Vitamin D deficiency 10/16/14       Past Surgical History:   Procedure Laterality Date    HX HEART CATHETERIZATION      HX TUBAL LIGATION         Social History     Tobacco Use    Smoking status: Former Smoker     Packs/day: 0.25     Years: 48.00     Pack years: 12.00     Types: Cigarettes     Last attempt to quit: 2018     Years since quittin.1    Smokeless tobacco: Never Used   Substance Use Topics    Alcohol use: No     Alcohol/week: 0.0 standard drinks       No Known Allergies    Prior to Admission medications    Medication Sig Start Date End Date Taking? Authorizing Provider   sacubitril-valsartan (ENTRESTO) 97 mg/103 mg tablet Take 1 Tab by mouth two (2) times a day. 19  Yes Kendra Rabago NP   aspirin 81 mg chewable tablet Take 2 Tabs by mouth daily. 19  Yes Kendra Rabago NP   furosemide (LASIX) 40 mg tablet Take 0.5 Tabs by mouth daily. 18  Yes Stan RED NP   raNITIdine (ZANTAC) 150 mg tablet Take 1 Tab by mouth two (2) times a day. 18  Yes Joyce Rodríguez NP   carvedilol (COREG) 6.25 mg tablet Take 1 Tab by mouth two (2) times daily (with meals). 18  Yes Stan RED NP   atorvastatin (LIPITOR) 20 mg tablet Take 1 Tab by mouth nightly. 18  Yes Olga Montalvo MD   insulin detemir (LEVEMIR) 100 unit/mL injection 15 units daily for diabetes 17  Yes Iqra Irizarry NP   desloratadine (CLARINEX) 5 mg tablet Take 1 Tab by mouth daily as needed for Allergies. 17  Yes Iqra Irizarry NP   mometasone (NASONEX) 50 mcg/actuation nasal spray 2 Sprays by Both Nostrils route daily as needed. For allergies 17  Yes Maria Isabel Irizarry NP   umeclidinium-vilanterol (ANORO ELLIPTA) 62.5-25 mcg/actuation inhaler Take 1 Puff by inhalation daily.  For COPD 17  Yes Maria Isaebl Irizarry NP   ferrous sulfate 325 mg (65 mg iron) tablet Take 1 Tab by mouth two (2) times a day. Indications: IRON DEFICIENCY ANEMIA 10/22/15  Yes Silvia Murdock, NP         Visit Vitals  /57   Pulse 69   Ht 5' 4\" (1.626 m)   Wt 100.3 kg (221 lb 3.2 oz)   BMI 37.97 kg/m²       Review of Systems   Constitutional: Negative for malaise/fatigue. Respiratory: Positive for shortness of breath. Negative for cough and wheezing. Cardiovascular: Negative for chest pain, palpitations, orthopnea, claudication, leg swelling and PND. Gastrointestinal: Negative for nausea and vomiting. Musculoskeletal: Negative for falls. Neurological: Negative for dizziness. Endo/Heme/Allergies: Does not bruise/bleed easily. 5/2018 Echo  SUMMARY:  Left ventricle: Systolic function was moderately to markedly reduced by   visual assessment. Ejection fraction was  estimated to be 35 %. There was severe hypokinesis of the entire anterior,   basal-mid anteroseptal, apical septal,  apical lateral, and apical wall(s). Wall thickness was mildly increased. Right ventricle: Systolic pressure was mildly increased. Estimated peak   pressure was 44 mmHg. Mitral valve: There was mild to moderate regurgitation. COMPARISONS:  Comparison was made with the previous study of 23-Aug-2015. LV overall   function has decreased. Pulmonary artery  pressure has increased. Otherwise no significant change. Physical Exam   Constitutional: She is oriented to person, place, and time. She appears well-developed and well-nourished. Neck: No JVD present. Cardiovascular: Normal rate, regular rhythm, normal heart sounds and intact distal pulses. Exam reveals no gallop and no friction rub. No murmur heard. Pulmonary/Chest: Effort normal and breath sounds normal. No respiratory distress. She has no wheezes. She has no rales. She exhibits no tenderness. Abdominal: Soft. She exhibits no distension and no mass. There is no tenderness.    Musculoskeletal: Normal range of motion. She exhibits no edema. Neurological: She is alert and oriented to person, place, and time. Skin: Skin is warm and dry. Psychiatric: She has a normal mood and affect. ASSESSMENT and PLAN    7/2019 CAD s/p YADIRA to LAD and ischemic cardiomyopathy with EF 35 % in 5/2018. She denies chest pain, but c/o shortness of breath since MI. Will repeat echo to re-evaluate EF and d/c Brilinta as stents were placed over 1 year ago. She has tolerated Entresto - will increase to 97/103 mg. Increase Aspirin to 162 mg. Labs in 1 week after increase Entresto, CMP, CBC, and lipid panel. Diagnoses and all orders for this visit:    1. Coronary artery disease involving native coronary artery of native heart without angina pectoris  -     aspirin 81 mg chewable tablet; Take 2 Tabs by mouth daily. 2. Ischemic cardiomyopathy  -     ECHO ADULT COMPLETE; Future    3. Chronic systolic congestive heart failure (HCC)  -     sacubitril-valsartan (ENTRESTO) 97 mg/103 mg tablet; Take 1 Tab by mouth two (2) times a day. -     METABOLIC PANEL, COMPREHENSIVE; Future  -     CBC W/O DIFF; Future  -     ECHO ADULT COMPLETE; Future    4. Essential hypertension    5. Dyslipidemia, goal LDL below 70  -     LIPID PANEL; Future    6. Type 2 diabetes with nephropathy [facility])        Pertinent laboratory and test data reviewed and discussed with patient.   See patient instructions also for other medical advice given    Medications Discontinued During This Encounter   Medication Reason    albuterol (PROVENTIL HFA, VENTOLIN HFA, PROAIR HFA) 90 mcg/actuation inhaler     albuterol (PROVENTIL VENTOLIN) 2.5 mg /3 mL (0.083 %) nebulizer solution     leflunomide (ARAVA) 20 mg tablet     gabapentin (NEURONTIN) 300 mg capsule     HYDROcodone-acetaminophen (NORCO) 5-325 mg per tablet     baclofen (LIORESAL) 10 mg tablet     ticagrelor (BRILINTA) 90 mg tablet Therapy Completed    sacubitril-valsartan (ENTRESTO) 49 mg/51 mg tablet Dose Adjustment    aspirin 81 mg chewable tablet Reorder       Follow-up and Dispositions    · Return in about 3 months (around 10/19/2019), or if symptoms worsen or fail to improve. Yesenia Carroll, NP-C  I have independently evaluated and examined the patient. Seen as a new patient. She has ischemic cardiomyopathy status post STEMI and stenting about a year ago. She still has dyspnea on exertion but no significant angina. Will increase aspirin to 162 mg a day, discontinue Brilinta. Increase Entresto and follow-up electrolytes closely. She runs a high normal potassium already. Check an echo and follow-up closely. All relevant labs and testing data's are reviewed. Care plan discussed and updated after review.   Tru Ureña MD

## 2019-07-19 NOTE — PROGRESS NOTES
1. Have you been to the ER, urgent care clinic since your last visit? Hospitalized since your last visit?     no  2. Have you seen or consulted any other health care providers outside of the [address] [place] [address] since your last visit? Include any pap smears or colon screening. No     3. Since your last visit, have you had any of the following symptoms?      no    4. Have you had any blood work, X-rays or cardiac testing? No     Requested: NO     In [facility]: YES    5. Where do you normally have your labs drawn? PCP    6. Do you need any refills today?    No

## 2019-08-09 ENCOUNTER — HOSPITAL ENCOUNTER (OUTPATIENT)
Dept: VASCULAR SURGERY | Age: 69
Discharge: HOME | End: 2019-08-09
Attending: FAMILY MEDICINE
Payer: MEDICARE

## 2019-08-09 DIAGNOSIS — M79.89 RIGHT LEG SWELLING: ICD-10-CM

## 2019-08-09 DIAGNOSIS — M25.572 ACUTE LEFT ANKLE PAIN: ICD-10-CM

## 2019-08-09 PROCEDURE — 93970 EXTREMITY STUDY: CPT

## 2019-08-13 ENCOUNTER — OFFICE VISIT (OUTPATIENT)
Dept: ORTHOPEDIC SURGERY | Facility: CLINIC | Age: 69
End: 2019-08-13

## 2019-08-13 VITALS
HEART RATE: 84 BPM | DIASTOLIC BLOOD PRESSURE: 87 MMHG | OXYGEN SATURATION: 94 % | HEIGHT: 64 IN | SYSTOLIC BLOOD PRESSURE: 172 MMHG | WEIGHT: 227 LBS | RESPIRATION RATE: 16 BRPM | TEMPERATURE: 96.8 F | BODY MASS INDEX: 38.76 KG/M2

## 2019-08-13 DIAGNOSIS — S92.351D DISPLACED FRACTURE OF FIFTH METATARSAL BONE, RIGHT FOOT, SUBSEQUENT ENCOUNTER FOR FRACTURE WITH ROUTINE HEALING: Primary | ICD-10-CM

## 2019-08-13 NOTE — PROGRESS NOTES
1. Have you been to the ER, urgent care clinic since your last visit? Hospitalized since your last visit? NO    2. Have you seen or consulted any other health care providers outside of the [address] [place] [address] since your last visit? Include any pap smears or colon screening.   NO

## 2019-08-15 ENCOUNTER — HOSPITAL ENCOUNTER (EMERGENCY)
Age: 69
Discharge: HOME | End: 2019-08-15
Attending: EMERGENCY MEDICINE
Payer: MEDICARE

## 2019-08-15 ENCOUNTER — APPOINTMENT (OUTPATIENT)
Dept: GENERAL RADIOLOGY | Age: 69
End: 2019-08-15
Attending: EMERGENCY MEDICINE
Payer: MEDICARE

## 2019-08-15 VITALS
HEIGHT: 64 IN | OXYGEN SATURATION: 100 % | WEIGHT: 220 LBS | BODY MASS INDEX: 37.56 KG/M2 | RESPIRATION RATE: 16 BRPM | DIASTOLIC BLOOD PRESSURE: 70 MMHG | HEART RATE: 86 BPM | TEMPERATURE: 98.6 F | SYSTOLIC BLOOD PRESSURE: 149 MMHG

## 2019-08-15 DIAGNOSIS — M10.9 ACUTE GOUT INVOLVING TOE OF LEFT FOOT, UNSPECIFIED CAUSE: ICD-10-CM

## 2019-08-15 DIAGNOSIS — M25.572 ACUTE LEFT ANKLE PAIN: ICD-10-CM

## 2019-08-15 DIAGNOSIS — M10.9 ACUTE GOUT OF LEFT ANKLE, UNSPECIFIED CAUSE: Primary | ICD-10-CM

## 2019-08-15 LAB
ALBUMIN SERPL-MCNC: 3.1 G/DL (ref 3.4–5)
ALBUMIN/GLOB SERPL: 0.8 {RATIO} (ref 0.8–1.7)
ALP SERPL-CCNC: 110 U/L (ref 45–117)
ALT SERPL-CCNC: 11 U/L (ref 13–56)
ANION GAP SERPL CALC-SCNC: 10 MMOL/L (ref 3–18)
AST SERPL-CCNC: 6 U/L (ref 10–38)
BASOPHILS # BLD: 0 K/UL (ref 0–0.1)
BASOPHILS NFR BLD: 0 % (ref 0–2)
BILIRUB SERPL-MCNC: 0.9 MG/DL (ref 0.2–1)
BUN SERPL-MCNC: 14 MG/DL (ref 7–18)
BUN/CREAT SERPL: 10 (ref 12–20)
CALCIUM SERPL-MCNC: 8.9 MG/DL (ref 8.5–10.1)
CHLORIDE SERPL-SCNC: 111 MMOL/L (ref 100–111)
CO2 SERPL-SCNC: 23 MMOL/L (ref 21–32)
CREAT SERPL-MCNC: 1.34 MG/DL (ref 0.6–1.3)
DIFFERENTIAL METHOD BLD: ABNORMAL
EOSINOPHIL # BLD: 0.1 K/UL (ref 0–0.4)
EOSINOPHIL NFR BLD: 1 % (ref 0–5)
ERYTHROCYTE [DISTWIDTH] IN BLOOD BY AUTOMATED COUNT: 16 % (ref 11.6–14.5)
GLOBULIN SER CALC-MCNC: 3.7 G/DL (ref 2–4)
GLUCOSE SERPL-MCNC: 115 MG/DL (ref 74–99)
HCT VFR BLD AUTO: 28 % (ref 35–45)
HGB BLD-MCNC: 8.3 G/DL (ref 12–16)
LACTATE BLD-SCNC: 0.92 MMOL/L (ref 0.4–2)
LYMPHOCYTES # BLD: 1.6 K/UL (ref 0.9–3.6)
LYMPHOCYTES NFR BLD: 19 % (ref 21–52)
MCH RBC QN AUTO: 26.7 PG (ref 24–34)
MCHC RBC AUTO-ENTMCNC: 29.6 G/DL (ref 31–37)
MCV RBC AUTO: 90 FL (ref 74–97)
MONOCYTES # BLD: 1.1 K/UL (ref 0.05–1.2)
MONOCYTES NFR BLD: 13 % (ref 3–10)
NEUTS SEG # BLD: 5.8 K/UL (ref 1.8–8)
NEUTS SEG NFR BLD: 67 % (ref 40–73)
PLATELET # BLD AUTO: 429 K/UL (ref 135–420)
PMV BLD AUTO: 9 FL (ref 9.2–11.8)
POTASSIUM SERPL-SCNC: 3.7 MMOL/L (ref 3.5–5.5)
PROT SERPL-MCNC: 6.8 G/DL (ref 6.4–8.2)
RBC # BLD AUTO: 3.11 M/UL (ref 4.2–5.3)
SODIUM SERPL-SCNC: 144 MMOL/L (ref 136–145)
URATE SERPL-MCNC: 7.2 MG/DL (ref 2.6–7.2)
WBC # BLD AUTO: 8.5 K/UL (ref 4.6–13.2)

## 2019-08-15 PROCEDURE — 87040 BLOOD CULTURE FOR BACTERIA: CPT

## 2019-08-15 PROCEDURE — 80053 COMPREHEN METABOLIC PANEL: CPT

## 2019-08-15 PROCEDURE — 83605 ASSAY OF LACTIC ACID: CPT

## 2019-08-15 PROCEDURE — 84550 ASSAY OF BLOOD/URIC ACID: CPT

## 2019-08-15 PROCEDURE — 93005 ELECTROCARDIOGRAM TRACING: CPT

## 2019-08-15 PROCEDURE — 71045 X-RAY EXAM CHEST 1 VIEW: CPT

## 2019-08-15 PROCEDURE — 74011250637 HC RX REV CODE- 250/637: Performed by: EMERGENCY MEDICINE

## 2019-08-15 PROCEDURE — 85025 COMPLETE CBC W/AUTO DIFF WBC: CPT

## 2019-08-15 PROCEDURE — 99285 EMERGENCY DEPT VISIT HI MDM: CPT

## 2019-08-15 PROCEDURE — 74011636637 HC RX REV CODE- 636/637: Performed by: EMERGENCY MEDICINE

## 2019-08-15 PROCEDURE — 73630 X-RAY EXAM OF FOOT: CPT

## 2019-08-15 RX ORDER — PREDNISONE 20 MG/1
40 TABLET ORAL DAILY
Qty: 6 TAB | Refills: 0 | Status: SHIPPED | OUTPATIENT
Start: 2019-08-16 | End: 2019-08-19

## 2019-08-15 RX ORDER — HYDROCODONE BITARTRATE AND ACETAMINOPHEN 5; 325 MG/1; MG/1
2 TABLET ORAL ONCE
Status: COMPLETED | OUTPATIENT
Start: 2019-08-15 | End: 2019-08-15

## 2019-08-15 RX ORDER — PREDNISONE 20 MG/1
60 TABLET ORAL ONCE
Status: COMPLETED | OUTPATIENT
Start: 2019-08-15 | End: 2019-08-15

## 2019-08-15 RX ORDER — SODIUM CHLORIDE 0.9 % (FLUSH) 0.9 %
5-10 SYRINGE (ML) INJECTION AS NEEDED
Status: DISCONTINUED | OUTPATIENT
Start: 2019-08-15 | End: 2019-08-15 | Stop reason: HOSPADM

## 2019-08-15 RX ORDER — HYDROCODONE BITARTRATE AND ACETAMINOPHEN 5; 325 MG/1; MG/1
1 TABLET ORAL
Qty: 10 TAB | Refills: 0 | Status: SHIPPED | OUTPATIENT
Start: 2019-08-15 | End: 2019-08-18

## 2019-08-15 RX ADMIN — PREDNISONE 60 MG: 20 TABLET ORAL at 16:50

## 2019-08-15 RX ADMIN — HYDROCODONE BITARTRATE AND ACETAMINOPHEN 2 TABLET: 5; 325 TABLET ORAL at 16:50

## 2019-08-15 NOTE — ED NOTES
Current Discharge Medication List      START taking these medications    Details   predniSONE (DELTASONE) 20 mg tablet Take 40 mg by mouth daily for 3 days. Start this medication on Friday, 8/16/2019. Take with Breakfast  Indications: acute inflammation of the joints due to gout attack  Qty: 6 Tab, Refills: 0      HYDROcodone-acetaminophen (NORCO) 5-325 mg per tablet Take 1 Tab by mouth every six (6) hours as needed for Pain for up to 3 days. Max Daily Amount: 4 Tabs. Qty: 10 Tab, Refills: 0    Associated Diagnoses: Acute gout of left ankle, unspecified cause; Acute left ankle pain           Patient armband removed and shredded. Prescriptions given and reviewed with patient.

## 2019-08-15 NOTE — DISCHARGE INSTRUCTIONS
Patient Education     If you were prescribed any medication take as directed. Do not drive or use heavy equipment if prescribed narcotics. Follow up with your primary care physician or with specialist as directed. Return to the emergency room with any new or worsening conditions. Gout: Care Instructions  Your Care Instructions    Gout is a form of arthritis caused by a buildup of uric acid crystals in a joint. It causes sudden attacks of pain, swelling, redness, and stiffness, usually in one joint, especially the big toe. Gout usually comes on without a cause. But it can be brought on by drinking alcohol (especially beer) or eating seafood and red meat. Taking certain medicines, such as diuretics or aspirin, also can bring on an attack of gout. Taking your medicines as prescribed and following up with your doctor regularly can help you avoid gout attacks in the future. Follow-up care is a key part of your treatment and safety. Be sure to make and go to all appointments, and call your doctor if you are having problems. It's also a good idea to know your test results and keep a list of the medicines you take. How can you care for yourself at home? · If the joint is swollen, put ice or a cold pack on the area for 10 to 20 minutes at a time. Put a thin cloth between the ice and your skin. · Prop up the sore limb on a pillow when you ice it or anytime you sit or lie down during the next 3 days. Try to keep it above the level of your heart. This will help reduce swelling. · Rest sore joints. Avoid activities that put weight or strain on the joints for a few days. Take short rest breaks from your regular activities during the day. · Take your medicines exactly as prescribed. Call your doctor if you think you are having a problem with your medicine. · Take pain medicines exactly as directed. ? If the doctor gave you a prescription medicine for pain, take it as prescribed.   ? If you are not taking a prescription pain medicine, ask your doctor if you can take an over-the-counter medicine. · Eat less seafood and red meat. · Check with your doctor before drinking alcohol. · Losing weight, if you are overweight, may help reduce attacks of gout. But do not go on a [employer]. \" Losing a lot of weight in a short amount of time can cause a gout attack. When should you call for help? Call your doctor now or seek immediate medical care if:    · You have a fever.     · The joint is so painful you cannot use it.     · You have sudden, unexplained swelling, redness, warmth, or severe pain in one or more joints.    Watch closely for changes in your health, and be sure to contact your doctor if:    · You have joint pain.     · Your symptoms get worse or are not improving after 2 or 3 days. Where can you learn more? Go to http://jasonPacerProcharity.info/. Enter B854 in the search box to learn more about \"Gout: Care Instructions. \"  Current as of: April 1, 2019  Content Version: 12.1  © 0301-0988 [employer]. Care instructions adapted under license by [employer] (which disclaims liability or warranty for this information). If you have questions about a medical condition or this instruction, always ask your healthcare professional. [address] any warranty or liability for your use of this information. Patient Education        Purine-Restricted Diet: Care Instructions  Your Care Instructions    Purines are substances that are found in some foods. Your body turns purines into uric acid. High levels of uric acid can cause gout, which is a form of arthritis that causes pain and inflammation in joints. You may be able to help control the amount of uric acid in your body by limiting high-purine foods in your diet. Follow-up care is a key part of your treatment and safety.  Be sure to make and go to all appointments, and call your doctor if you are having problems. It's also a good idea to know your test results and keep a list of the medicines you take. How can you care for yourself at home? · Plan your meals and snacks around foods that are low in purines and are safe for you to eat. These foods include:  ? Green vegetables and tomatoes. ? Fruits. ? Whole-grain breads, rice, and cereals. ? Eggs, peanut butter, and nuts. ? Low-fat milk, cheese, and other milk products. ? Popcorn. ? Gelatin desserts, chocolate, cocoa, and cakes and sweets, in small amounts. · You can eat certain foods that are medium-high in purines, but eat them only once in a while. These foods include:  ? Legumes, such as dried beans and dried peas. You can have 1 cup cooked legumes each day. ? Asparagus, cauliflower, spinach, mushrooms, and green peas. ? Fish and seafood (other than very high-purine seafood). ? Oatmeal, wheat bran, and wheat germ. · Limit very high-purine foods, including:  ? Organ meats, such as liver, kidneys, sweetbreads, and brains. ? Meats, including [place], beef, pork, and lamb. ? Game meats and any other meats in large amounts. ? Anchovies, sardines, herring, mackerel, and scallops. ? Gravy. ? Beer. Where can you learn more? Go to http://jason-charity.info/. Enter F448 in the search box to learn more about \"Purine-Restricted Diet: Care Instructions. \"  Current as of: November 7, 2018  Content Version: 12.1  © 3871-2047 [employer]. Care instructions adapted under license by [employer] (which disclaims liability or warranty for this information). If you have questions about a medical condition or this instruction, always ask your healthcare professional. [address] any warranty or liability for your use of this information.

## 2019-08-15 NOTE — ED TRIAGE NOTES
Patient c/o left foot pain. States difficulty with weight bearing to left foot related to c/o pain. Denies injury. She states pain x 2 days. Advises that she attempted to contact PCP for refill of medication to treat gout. States prior hx of gout to left foot.

## 2019-08-15 NOTE — ED PROVIDER NOTES
EMERGENCY DEPARTMENT HISTORY AND PHYSICAL EXAM    4:20 PM      Date: 8/15/2019  Patient Name: Heather Simmons    History of Presenting Illness     Chief Complaint   Patient presents with    Foot Pain    Fever         History Provided By: Patient    Additional History (Context): Heather Simmons is a 81 y.o. female with past medical history of diabetes, hypertension, myocardial infarction and And gout, chronic pain syndrome who presents with chief complaint of left foot and ankle pain for the past several days. Patient states that her PCP diagnosed her with gout and started on prednisone. Patient admits that she has been eating seafood recently.  at the bedside states that she has been eating a lot of seafood including fish and shellfish this week. Patient states that she took prednisone and completed in 5 days and the pain was improving, but after completed steroids a week ago the pain returned and more severe. She states that this too painful to bear weight on the left foot. She denies any  Fever, cough, leg swelling or leg pain, shortness of breath, chest pain, nausea, numbness, weakness, or trauma, and no other complaint. PCP: Melquiades Verdin MD        Past History     Past Medical History:  Past Medical History:   Diagnosis Date    Abnormal WBC count 5/17/2016    Bilateral shoulder pain 9/27/2016    Chondromalacia of both patellae     COPD (chronic obstructive pulmonary disease) ([facility] 9/2015    mild-mod dz; Dr Zion Higuera    Diabetes [facility]) 2013    Diabetic eye exam ([facility] [facility] 2016    Dilated cardiomyopathy ([facility] [facility]     Dyslipidemia     Gout     Heart attack ([facility]     Heart attack ([facility] [address] [facility]     History of echocardiogram 11/14/2014    Mild LVE. EF 40%. Mild, diffuse hypk. Mild LAE.   Mild MR.      Hypercholesteremia 1/08/14    Hypertension 2000    Noncompliance with medications 2/16/2016    Obesity     Orthostatic hypotension 5/17/2016    Osteoarthritis of both knees     Pain management 2016    Dr. Lyric Bahena Popliteal cyst, bilateral      Vitamin D deficiency 10/16/14       Past Surgical History:  Past Surgical History:   Procedure Laterality Date    HX HEART CATHETERIZATION      HX TUBAL LIGATION         Family History:  Family History   Problem Relation Age of Onset    Diabetes Mother     Hypertension Mother     Hypertension Father     Kidney Disease Maternal Aunt 48        Dialysis       Social History:  Social History     Tobacco Use    Smoking status: Former Smoker     Packs/day: 0.25     Years: 48.00     Pack years: 12.00     Types: Cigarettes     Last attempt to quit: 2018     Years since quittin.2    Smokeless tobacco: Never Used   Substance Use Topics    Alcohol use: No     Alcohol/week: 0.0 standard drinks    Drug use: No       Allergies:  No Known Allergies      Review of Systems       Review of Systems   Constitutional: Negative for chills and fever. HENT: Negative for congestion, rhinorrhea, sore throat and trouble swallowing. Eyes: Negative for visual disturbance. Respiratory: Negative for cough, shortness of breath and wheezing. Cardiovascular: Negative for chest pain. Gastrointestinal: Negative for abdominal pain, nausea and vomiting. Endocrine: Negative for polyuria. Genitourinary: Negative for dysuria. Musculoskeletal: Positive for arthralgias. Negative for neck pain and neck stiffness. Left great toe and ankle pain  Unable to bear weight on the left foot due to pain   Skin: Negative for pallor and rash. Neurological: Negative for dizziness, weakness, numbness and headaches. Hematological: Does not bruise/bleed easily. Psychiatric/Behavioral: Negative for confusion and dysphoric mood. All other systems reviewed and are negative.         Physical Exam     Visit Vitals  /70   Pulse 86   Temp 98.6 °F (37 °C)   Resp 16   Ht 5' 4\" (1.626 m)   Wt 99.8 kg (220 lb)   SpO2 100%   BMI 37.76 kg/m²         Physical Exam Constitutional: She is oriented to person, place, and time. She appears well-developed and well-nourished. She appears distressed. Appears in pain   HENT:   Head: Normocephalic and atraumatic. Mouth/Throat: Oropharynx is clear and moist.   Eyes: Pupils are equal, round, and reactive to light. Conjunctivae are normal. No scleral icterus. Neck: Normal range of motion. Neck supple. Cardiovascular: Intact distal pulses. Capillary refill < 3 seconds  Tachycardic with heart rate of 105 on a monitor as I am at the bedside   Pulmonary/Chest: Effort normal and breath sounds normal. No respiratory distress. She has no wheezes. Abdominal: Soft. Bowel sounds are normal. She exhibits no distension. There is no tenderness. Musculoskeletal: Normal range of motion. She exhibits no edema. Tenderness of the left great toe going into the first metatarsal region  Also tenderness of the left ankle  There is no redness of the foot or ankle  No lower extremity edema, no calf tenderness  She has good range of motion of her ankle and foot although there is pain associated with it. Lymphadenopathy:     She has no cervical adenopathy. Neurological: She is alert and oriented to person, place, and time. No cranial nerve deficit. She exhibits normal muscle tone. Coordination normal.     Full range of motion bilateral lower extremities  Does have good dorsiflexion and plantarflexion of the left ankle but with some pain associated. Sensation intact     Skin: Skin is warm and dry. She is not diaphoretic. Psychiatric: She has a normal mood and affect. Her behavior is normal.   Nursing note and vitals reviewed.         Diagnostic Study Results     Labs -  Recent Results (from the past 12 hour(s))   EKG, 12 LEAD, INITIAL    Collection Time: 08/15/19  4:26 PM   Result Value Ref Range    Ventricular Rate 98 BPM    Atrial Rate 98 BPM    P-R Interval 170 ms    QRS Duration 76 ms    Q-T Interval 356 ms    QTC Calculation (Bezet) 454 ms    Calculated P Axis 71 degrees    Calculated R Axis 50 degrees    Calculated T Axis 71 degrees    Diagnosis       Normal sinus rhythm  Normal ECG  When compared with ECG of 08-JUN-2019 17:07,  No significant change was found     CBC WITH AUTOMATED DIFF    Collection Time: 08/15/19  4:30 PM   Result Value Ref Range    WBC 8.5 4.6 - 13.2 K/uL    RBC 3.11 (L) 4.20 - 5.30 M/uL    HGB 8.3 (L) 12.0 - 16.0 g/dL    HCT 28.0 (L) 35.0 - 45.0 %    MCV 90.0 74.0 - 97.0 FL    MCH 26.7 24.0 - 34.0 PG    MCHC 29.6 (L) 31.0 - 37.0 g/dL    RDW 16.0 (H) 11.6 - 14.5 %    PLATELET 607 (H) 596 - 420 K/uL    MPV 9.0 (L) 9.2 - 11.8 FL    NEUTROPHILS 67 40 - 73 %    LYMPHOCYTES 19 (L) 21 - 52 %    MONOCYTES 13 (H) 3 - 10 %    EOSINOPHILS 1 0 - 5 %    BASOPHILS 0 0 - 2 %    ABS. NEUTROPHILS 5.8 1.8 - 8.0 K/UL    ABS. LYMPHOCYTES 1.6 0.9 - 3.6 K/UL    ABS. MONOCYTES 1.1 0.05 - 1.2 K/UL    ABS. EOSINOPHILS 0.1 0.0 - 0.4 K/UL    ABS. BASOPHILS 0.0 0.0 - 0.1 K/UL    DF AUTOMATED     METABOLIC PANEL, COMPREHENSIVE    Collection Time: 08/15/19  4:30 PM   Result Value Ref Range    Sodium 144 136 - 145 mmol/L    Potassium 3.7 3.5 - 5.5 mmol/L    Chloride 111 100 - 111 mmol/L    CO2 23 21 - 32 mmol/L    Anion gap 10 3.0 - 18 mmol/L    Glucose 115 (H) 74 - 99 mg/dL    BUN 14 7.0 - 18 MG/DL    Creatinine 1.34 (H) 0.6 - 1.3 MG/DL    BUN/Creatinine ratio 10 (L) 12 - 20      GFR est AA 48 (L) >60 ml/min/1.73m2    GFR est non-AA 39 (L) >60 ml/min/1.73m2    Calcium 8.9 8.5 - 10.1 MG/DL    Bilirubin, total 0.9 0.2 - 1.0 MG/DL    ALT (SGPT) 11 (L) 13 - 56 U/L    AST (SGOT) 6 (L) 10 - 38 U/L    Alk.  phosphatase 110 45 - 117 U/L    Protein, total 6.8 6.4 - 8.2 g/dL    Albumin 3.1 (L) 3.4 - 5.0 g/dL    Globulin 3.7 2.0 - 4.0 g/dL    A-G Ratio 0.8 0.8 - 1.7     URIC ACID    Collection Time: 08/15/19  4:30 PM   Result Value Ref Range    Uric acid 7.2 2.6 - 7.2 MG/DL   POC LACTIC ACID    Collection Time: 08/15/19  4:34 PM   Result Value Ref Range    Lactic Acid (POC) 0.92 0.40 - 2.00 mmol/L       Radiologic Studies -   XR FOOT LT MIN 3 V   Final Result   IMPRESSION:   1. No evidence of acute fracture or dislocation. 2.  Old postoperative repair in the calcaneus likely for the Achilles tendon. 3.  Moderate first MTP osteoarthritis. XR CHEST PORT   Final Result   IMPRESSION:      No acute pulmonic disease. Per my preliminary read on chest x-ray: No acute process  Per my preliminary read on x-ray foot: Old hardware in the heel, otherwise no fracture, no dislocation  Mary Marc DO      Medical Decision Making   I am the first provider for this patient. I reviewed the vital signs, available nursing notes, past medical history, past surgical history, family history and social history. Vital Signs-Reviewed the patient's vital signs. Pulse Oximetry Analysis -  100 on room air (Interpretation) normal    Cardiac Monitor:  Rate: 103   rhythm:  Sinus Tachycardia     EKG: Interpreted by the EP Dr. Minerva Conde. Time Interpreted: 4:30 PM   Rate: 98   Rhythm: Normal Sinus Rhythm    Interpretation: Normal QRS duration, normal QTC, no ST elevation, no ST depressions       Records Reviewed: Nursing Notes and Old Medical Records (Time of Review: 4:20 PM)    Provider Notes (Medical Decision Making): DDX: Acute gout, cellulitis, fracture, DVT, arthritis, chronic pain    Patient has low-grade fever, slightly tachycardic with rate of 104.     Check labs, EKG, check for sources of any infection, will get uric acid, give pain medication and antipyretic  MDM    Medications   sodium chloride (NS) flush 5-10 mL (has no administration in time range)   HYDROcodone-acetaminophen (NORCO) 5-325 mg per tablet 2 Tab (2 Tabs Oral Given 8/15/19 1650)   predniSONE (DELTASONE) tablet 60 mg (60 mg Oral Given 8/15/19 1650)           ED Course: Progress Notes, Reevaluation, and Consults:    Lactic acid negative    RN notified me the patient was drinking hot coffee prior to coming into the ED which could be associated with her slightly elevated temperature or could be from stress phase response. Patient and pain extremities could be associated with her elevated heart rate. However still awaiting some lab results. WBC within normal limits  Hemoglobin 8.3 and stable, was recently 8.9    Uric acid 7.2  Patient had the recent uric acid level that was a little higher at 9.7 which is when she was diagnosed with gout. P vital signs, temperature normal at 98.6, respirations 16, pulse 86. The no DVTs  on recent DVT study last week. 8/9/2019, duplex lower extremities:     Right Lower Venous     No evidence of deep vein thrombosis in the common femoral, profunda femoral, superficial femoral, popliteal, posterior tibial, and peroneal veins. The veins were imaged in the transverse and longitudinal planes. The vessels showed normal color filling and compressibility. Doppler interrogation showed phasic and spontaneous flow. The right posterior tibial artery has monophasic waveforms. Left Lower Venous     No evidence of deep vein thrombosis in the common femoral, profunda femoral, superficial femoral, popliteal, posterior tibial, and peroneal veins. The veins were imaged in the transverse and longitudinal planes. The vessels showed normal color filling and compressibility. Doppler interrogation showed phasic and spontaneous flow. No sepsis  Doubt infection  Consistent with gout flare. Will encourage patient to refrain from purine rich foods which she is eating currently in abundance. I have reassessed the patient. I have discussed the workup, results and plan with the patient and patient is in agreement. Patient is feeling better. Patient will be prescribed Norco, and a short burst course of prednisone. Patient was discharge in stable condition. Patient was given outpatient follow up. Patient is to return to emergency department if any new or worsening condition. Diagnosis     Clinical Impression:   1. Acute gout of left ankle, unspecified cause    2. Acute gout involving toe of left foot, unspecified cause    3. Acute left ankle pain        Disposition: Discharged    Follow-up Information     Follow up With Specialties Details Why Contact Info    Pat Duffy MD Internal Medicine Schedule an appointment as soon as possible for a visit in 3 days  [phone] [phone] EMERGENCY DEPT Emergency Medicine  As needed, If symptoms worsen [address] [facility]  [phone]           Patient's Medications   Start Taking    HYDROCODONE-ACETAMINOPHEN (NORCO) 5-325 MG PER TABLET    Take 1 Tab by mouth every six (6) hours as needed for Pain for up to 3 days. Max Daily Amount: 4 Tabs. PREDNISONE (DELTASONE) 20 MG TABLET    Take 40 mg by mouth daily for 3 days. Start this medication on Friday, 8/16/2019. Take with Breakfast  Indications: acute inflammation of the joints due to gout attack   Continue Taking    ASPIRIN 81 MG CHEWABLE TABLET    Take 2 Tabs by mouth daily. ATORVASTATIN (LIPITOR) 20 MG TABLET    Take 1 Tab by mouth nightly. CARVEDILOL (COREG) 6.25 MG TABLET    Take 1 Tab by mouth two (2) times daily (with meals). DESLORATADINE (CLARINEX) 5 MG TABLET    Take 1 Tab by mouth daily as needed for Allergies. FERROUS SULFATE 325 MG (65 MG IRON) TABLET    Take 1 Tab by mouth two (2) times a day. Indications: IRON DEFICIENCY ANEMIA    FUROSEMIDE (LASIX) 40 MG TABLET    Take 0.5 Tabs by mouth daily. INSULIN DETEMIR (LEVEMIR) 100 UNIT/ML INJECTION    15 units daily for diabetes    MOMETASONE (NASONEX) 50 MCG/ACTUATION NASAL SPRAY    2 Sprays by Both Nostrils route daily as needed. For allergies    RANITIDINE (ZANTAC) 150 MG TABLET    Take 1 Tab by mouth two (2) times a day. SACUBITRIL-VALSARTAN (ENTRESTO) 97 MG/103 MG TABLET    Take 1 Tab by mouth two (2) times a day. UMECLIDINIUM-VILANTEROL (ANORO ELLIPTA) 62.5-25 MCG/ACTUATION INHALER    Take 1 Puff by inhalation daily. For COPD   These Medications have changed    No medications on file   Stop Taking    No medications on file         DO Amara Dee medical dictation software was used for portions of this report. Unintended transcription errors may occur. My signature above authenticates this document and my orders, the final    diagnosis (es), discharge prescription (s), and instructions in the Epic    record.

## 2019-08-16 LAB
ATRIAL RATE: 98 BPM
CALCULATED P AXIS, ECG09: 71 DEGREES
CALCULATED R AXIS, ECG10: 50 DEGREES
CALCULATED T AXIS, ECG11: 71 DEGREES
DIAGNOSIS, 93000: NORMAL
P-R INTERVAL, ECG05: 170 MS
Q-T INTERVAL, ECG07: 356 MS
QRS DURATION, ECG06: 76 MS
QTC CALCULATION (BEZET), ECG08: 454 MS
VENTRICULAR RATE, ECG03: 98 BPM

## 2019-08-21 LAB
BACTERIA SPEC CULT: NORMAL
BACTERIA SPEC CULT: NORMAL
SERVICE CMNT-IMP: NORMAL
SERVICE CMNT-IMP: NORMAL

## 2019-08-25 ENCOUNTER — APPOINTMENT (OUTPATIENT)
Dept: GENERAL RADIOLOGY | Age: 69
DRG: 190 | End: 2019-08-25
Attending: EMERGENCY MEDICINE
Payer: MEDICARE

## 2019-08-25 ENCOUNTER — HOSPITAL ENCOUNTER (INPATIENT)
Age: 69
LOS: 3 days | Discharge: HOME | DRG: 190 | End: 2019-08-28
Attending: EMERGENCY MEDICINE | Admitting: HOSPITALIST
Payer: MEDICARE

## 2019-08-25 DIAGNOSIS — J44.1 COPD EXACERBATION (HCC): Primary | ICD-10-CM

## 2019-08-25 LAB
ALBUMIN SERPL-MCNC: 3.1 G/DL (ref 3.4–5)
ALBUMIN/GLOB SERPL: 0.9 {RATIO} (ref 0.8–1.7)
ALP SERPL-CCNC: 115 U/L (ref 45–117)
ALT SERPL-CCNC: 14 U/L (ref 13–56)
ANION GAP SERPL CALC-SCNC: 9 MMOL/L (ref 3–18)
ARTERIAL PATENCY WRIST A: ABNORMAL
AST SERPL-CCNC: <3 U/L (ref 10–38)
ATRIAL RATE: 101 BPM
BASE DEFICIT BLDV-SCNC: 2 MMOL/L
BASOPHILS # BLD: 0 K/UL (ref 0–0.1)
BASOPHILS NFR BLD: 0 % (ref 0–2)
BDY SITE: ABNORMAL
BILIRUB SERPL-MCNC: 0.5 MG/DL (ref 0.2–1)
BODY TEMPERATURE: 97.4
BUN SERPL-MCNC: 18 MG/DL (ref 7–18)
BUN/CREAT SERPL: 14 (ref 12–20)
CALCIUM SERPL-MCNC: 8.7 MG/DL (ref 8.5–10.1)
CALCULATED P AXIS, ECG09: 71 DEGREES
CALCULATED R AXIS, ECG10: 23 DEGREES
CALCULATED T AXIS, ECG11: 85 DEGREES
CHLORIDE SERPL-SCNC: 115 MMOL/L (ref 100–111)
CO2 SERPL-SCNC: 23 MMOL/L (ref 21–32)
CREAT SERPL-MCNC: 1.29 MG/DL (ref 0.6–1.3)
DIAGNOSIS, 93000: NORMAL
DIFFERENTIAL METHOD BLD: ABNORMAL
EOSINOPHIL # BLD: 0.2 K/UL (ref 0–0.4)
EOSINOPHIL NFR BLD: 3 % (ref 0–5)
ERYTHROCYTE [DISTWIDTH] IN BLOOD BY AUTOMATED COUNT: 16.3 % (ref 11.6–14.5)
GAS FLOW.O2 O2 DELIVERY SYS: ABNORMAL L/MIN
GLOBULIN SER CALC-MCNC: 3.4 G/DL (ref 2–4)
GLUCOSE SERPL-MCNC: 155 MG/DL (ref 74–99)
HCO3 BLDV-SCNC: 23 MMOL/L (ref 23–28)
HCT VFR BLD AUTO: 28.7 % (ref 35–45)
HGB BLD-MCNC: 8.4 G/DL (ref 12–16)
LACTATE BLD-SCNC: 0.87 MMOL/L (ref 0.4–2)
LYMPHOCYTES # BLD: 1.4 K/UL (ref 0.9–3.6)
LYMPHOCYTES NFR BLD: 25 % (ref 21–52)
MCH RBC QN AUTO: 26.3 PG (ref 24–34)
MCHC RBC AUTO-ENTMCNC: 29.3 G/DL (ref 31–37)
MCV RBC AUTO: 89.7 FL (ref 74–97)
MONOCYTES # BLD: 0.5 K/UL (ref 0.05–1.2)
MONOCYTES NFR BLD: 8 % (ref 3–10)
NEUTS SEG # BLD: 3.5 K/UL (ref 1.8–8)
NEUTS SEG NFR BLD: 64 % (ref 40–73)
P-R INTERVAL, ECG05: 168 MS
PCO2 BLDV: 38.5 MMHG (ref 41–51)
PH BLDV: 7.38 [PH] (ref 7.32–7.42)
PLATELET # BLD AUTO: 398 K/UL (ref 135–420)
PMV BLD AUTO: 8.4 FL (ref 9.2–11.8)
PO2 BLDV: 12 MMHG (ref 25–40)
POTASSIUM SERPL-SCNC: 3.7 MMOL/L (ref 3.5–5.5)
PROT SERPL-MCNC: 6.5 G/DL (ref 6.4–8.2)
Q-T INTERVAL, ECG07: 358 MS
QRS DURATION, ECG06: 78 MS
QTC CALCULATION (BEZET), ECG08: 464 MS
RBC # BLD AUTO: 3.2 M/UL (ref 4.2–5.3)
SAO2 % BLDV: 13 % (ref 65–88)
SERVICE CMNT-IMP: ABNORMAL
SODIUM SERPL-SCNC: 147 MMOL/L (ref 136–145)
SPECIMEN TYPE: ABNORMAL
TOTAL RESP. RATE, ITRR: 28
TROPONIN I SERPL-MCNC: <0.02 NG/ML (ref 0–0.04)
VENTRICULAR RATE, ECG03: 101 BPM
WBC # BLD AUTO: 5.6 K/UL (ref 4.6–13.2)

## 2019-08-25 PROCEDURE — 80053 COMPREHEN METABOLIC PANEL: CPT

## 2019-08-25 PROCEDURE — 83605 ASSAY OF LACTIC ACID: CPT

## 2019-08-25 PROCEDURE — 65660000000 HC RM CCU STEPDOWN

## 2019-08-25 PROCEDURE — 74011000250 HC RX REV CODE- 250: Performed by: EMERGENCY MEDICINE

## 2019-08-25 PROCEDURE — 94640 AIRWAY INHALATION TREATMENT: CPT

## 2019-08-25 PROCEDURE — 93005 ELECTROCARDIOGRAM TRACING: CPT

## 2019-08-25 PROCEDURE — 71046 X-RAY EXAM CHEST 2 VIEWS: CPT

## 2019-08-25 PROCEDURE — 96374 THER/PROPH/DIAG INJ IV PUSH: CPT

## 2019-08-25 PROCEDURE — 84484 ASSAY OF TROPONIN QUANT: CPT

## 2019-08-25 PROCEDURE — 77030029684 HC NEB SM VOL KT MONA -A

## 2019-08-25 PROCEDURE — 96375 TX/PRO/DX INJ NEW DRUG ADDON: CPT

## 2019-08-25 PROCEDURE — 99285 EMERGENCY DEPT VISIT HI MDM: CPT

## 2019-08-25 PROCEDURE — 85025 COMPLETE CBC W/AUTO DIFF WBC: CPT

## 2019-08-25 PROCEDURE — 74011250636 HC RX REV CODE- 250/636: Performed by: EMERGENCY MEDICINE

## 2019-08-25 PROCEDURE — 82803 BLOOD GASES ANY COMBINATION: CPT

## 2019-08-25 RX ORDER — FUROSEMIDE 10 MG/ML
40 INJECTION INTRAMUSCULAR; INTRAVENOUS
Status: COMPLETED | OUTPATIENT
Start: 2019-08-25 | End: 2019-08-25

## 2019-08-25 RX ORDER — IPRATROPIUM BROMIDE AND ALBUTEROL SULFATE 2.5; .5 MG/3ML; MG/3ML
3 SOLUTION RESPIRATORY (INHALATION)
Status: COMPLETED | OUTPATIENT
Start: 2019-08-25 | End: 2019-08-25

## 2019-08-25 RX ADMIN — FUROSEMIDE 40 MG: 10 INJECTION, SOLUTION INTRAMUSCULAR; INTRAVENOUS at 14:50

## 2019-08-25 RX ADMIN — IPRATROPIUM BROMIDE AND ALBUTEROL SULFATE 3 ML: .5; 3 SOLUTION RESPIRATORY (INHALATION) at 13:15

## 2019-08-25 RX ADMIN — IPRATROPIUM BROMIDE AND ALBUTEROL SULFATE 3 ML: .5; 3 SOLUTION RESPIRATORY (INHALATION) at 13:33

## 2019-08-25 RX ADMIN — IPRATROPIUM BROMIDE AND ALBUTEROL SULFATE 3 ML: .5; 3 SOLUTION RESPIRATORY (INHALATION) at 14:01

## 2019-08-25 RX ADMIN — METHYLPREDNISOLONE SODIUM SUCCINATE 125 MG: 125 INJECTION, POWDER, FOR SOLUTION INTRAMUSCULAR; INTRAVENOUS at 13:15

## 2019-08-25 NOTE — ED TRIAGE NOTES
Pt states having shortness of breath since last night. Denies chest pain. States + cough.  + COPD. Pt speaking in full sentences. Pt currently in NAD. Reports nasal congestion.

## 2019-08-25 NOTE — ED PROVIDER NOTES
EMERGENCY DEPARTMENT HISTORY AND PHYSICAL EXAM    12:55 PM  Date: 8/25/2019  Patient Name: Jaylan Marsh    History of Presenting Illness     Chief Complaint   Patient presents with    Shortness of Breath       History Provided By: patient    HPI: Jaylan Marsh is a 81 y.o. female with past medical history of diabetes CAD, COPD, CHF (EF of 35%)  presents with chest tightness and congestion that started yesterday. Patient denies any fever. She has been coughing with dry cough. She has been using nebulized albuterol at home every few hours. PCP: Luci Suazo MD    Past History     Past Medical History:  Past Medical History:   Diagnosis Date    Abnormal WBC count 5/17/2016    Bilateral shoulder pain 9/27/2016    Chondromalacia of both patellae     COPD (chronic obstructive pulmonary disease) ([address] 9/2015    mild-mod dz; Dr Aron Fabry    Diabetes [facility]) 2013    Diabetic eye exam ([address] 2016    Dilated cardiomyopathy ([address]     Dyslipidemia     Gout     Heart attack ([address]     Heart attack ([address]     History of echocardiogram 11/14/2014    Mild LVE. EF 40%. Mild, diffuse hypk. Mild LAE.   Mild MR.      Hypercholesteremia 1/08/14    Hypertension 2000    Noncompliance with medications 2/16/2016    Obesity     Orthostatic hypotension 5/17/2016    Osteoarthritis of both knees     Pain management 04/01/2016    Dr. Rosenberg Labor Popliteal cyst, bilateral      Vitamin D deficiency 10/16/14       Past Surgical History:  Past Surgical History:   Procedure Laterality Date    HX HEART CATHETERIZATION      HX TUBAL LIGATION         Family History:  Family History   Problem Relation Age of Onset    Diabetes Mother     Hypertension Mother     Hypertension Father     Kidney Disease Maternal Aunt 48        Dialysis       Social History:  Social History     Tobacco Use    Smoking status: Former Smoker     Packs/day: 0.25     Years: 48.00     Pack years: 12.00     Types: Cigarettes     Last attempt to quit: 2018     Years since quittin.2    Smokeless tobacco: Never Used   Substance Use Topics    Alcohol use: No     Alcohol/week: 0.0 standard drinks    Drug use: No       Allergies:  No Known Allergies    Review of Systems   Review of Systems   Constitutional: Negative for activity change, appetite change and chills. HENT: Negative for congestion, ear discharge, ear pain and sore throat. Eyes: Negative for photophobia and pain. Respiratory: Positive for cough and chest tightness. Negative for choking. Cardiovascular: Negative for palpitations and leg swelling. Gastrointestinal: Negative for anal bleeding and rectal pain. Endocrine: Negative for polydipsia and polyuria. Genitourinary: Negative for genital sores and urgency. Musculoskeletal: Negative for arthralgias and myalgias. Neurological: Negative for dizziness, seizures and speech difficulty. Psychiatric/Behavioral: Negative for hallucinations, self-injury and suicidal ideas. Physical Exam     Patient Vitals for the past 12 hrs:   Temp Pulse Resp BP SpO2   19 2200 98.1 °F (36.7 °C) 98 27 139/69 95 %   19 1637 98.1 °F (36.7 °C) (!) 107 23 184/81 97 %   19 1503 -- 99 18 176/87 97 %   19 1450 -- 91 -- 174/90 --   19 1440 -- 89 27 -- 93 %   19 1400 -- 85 16 154/73 98 %   19 1330 -- 86 18 161/79 98 %   19 1314 -- 92 (!) 31 176/85 99 %   19 1245 98.2 °F (36.8 °C) 94 26 165/77 95 %       Physical Exam   Constitutional: She is oriented to person, place, and time. She appears well-developed and well-nourished. HENT:   Head: Normocephalic and atraumatic. Eyes: Right eye exhibits no discharge. Left eye exhibits no discharge. Neck: Normal range of motion. Neck supple. Cardiovascular: Normal rate, regular rhythm, normal heart sounds and intact distal pulses. Pulmonary/Chest: Effort normal. No respiratory distress. She has wheezes. She has no rales.    Decreased breath sounds bilateral  Patient is tachypneic   Abdominal: Soft. Bowel sounds are normal. She exhibits no distension. There is no tenderness. There is no rebound. Genitourinary: Rectal exam shows guaiac negative stool. No penile tenderness. Musculoskeletal: Normal range of motion. Neurological: She is alert and oriented to person, place, and time. She has normal reflexes. Skin: Skin is warm and dry. Diagnostic Study Results     Labs -  Recent Results (from the past 12 hour(s))   EKG, 12 LEAD, INITIAL    Collection Time: 08/25/19 12:43 PM   Result Value Ref Range    Ventricular Rate 101 BPM    Atrial Rate 101 BPM    P-R Interval 168 ms    QRS Duration 78 ms    Q-T Interval 358 ms    QTC Calculation (Bezet) 464 ms    Calculated P Axis 71 degrees    Calculated R Axis 23 degrees    Calculated T Axis 85 degrees    Diagnosis       Sinus tachycardia  Otherwise normal ECG  When compared with ECG of 15-AUG-2019 16:26,  No significant change was found  Confirmed by Raffy Shipman MD, ----- ([id number]) on 8/25/2019 4:13:22 PM     CBC WITH AUTOMATED DIFF    Collection Time: 08/25/19 12:50 PM   Result Value Ref Range    WBC 5.6 4.6 - 13.2 K/uL    RBC 3.20 (L) 4.20 - 5.30 M/uL    HGB 8.4 (L) 12.0 - 16.0 g/dL    HCT 28.7 (L) 35.0 - 45.0 %    MCV 89.7 74.0 - 97.0 FL    MCH 26.3 24.0 - 34.0 PG    MCHC 29.3 (L) 31.0 - 37.0 g/dL    RDW 16.3 (H) 11.6 - 14.5 %    PLATELET 338 467 - 963 K/uL    MPV 8.4 (L) 9.2 - 11.8 FL    NEUTROPHILS 64 40 - 73 %    LYMPHOCYTES 25 21 - 52 %    MONOCYTES 8 3 - 10 %    EOSINOPHILS 3 0 - 5 %    BASOPHILS 0 0 - 2 %    ABS. NEUTROPHILS 3.5 1.8 - 8.0 K/UL    ABS. LYMPHOCYTES 1.4 0.9 - 3.6 K/UL    ABS. MONOCYTES 0.5 0.05 - 1.2 K/UL    ABS. EOSINOPHILS 0.2 0.0 - 0.4 K/UL    ABS.  BASOPHILS 0.0 0.0 - 0.1 K/UL    DF AUTOMATED     METABOLIC PANEL, COMPREHENSIVE    Collection Time: 08/25/19 12:50 PM   Result Value Ref Range    Sodium 147 (H) 136 - 145 mmol/L    Potassium 3.7 3.5 - 5.5 mmol/L    Chloride 115 (H) 100 - 111 mmol/L    CO2 23 21 - 32 mmol/L    Anion gap 9 3.0 - 18 mmol/L    Glucose 155 (H) 74 - 99 mg/dL    BUN 18 7.0 - 18 MG/DL    Creatinine 1.29 0.6 - 1.3 MG/DL    BUN/Creatinine ratio 14 12 - 20      GFR est AA 50 (L) >60 ml/min/1.73m2    GFR est non-AA 41 (L) >60 ml/min/1.73m2    Calcium 8.7 8.5 - 10.1 MG/DL    Bilirubin, total 0.5 0.2 - 1.0 MG/DL    ALT (SGPT) 14 13 - 56 U/L    AST (SGOT) <3 (L) 10 - 38 U/L    Alk. phosphatase 115 45 - 117 U/L    Protein, total 6.5 6.4 - 8.2 g/dL    Albumin 3.1 (L) 3.4 - 5.0 g/dL    Globulin 3.4 2.0 - 4.0 g/dL    A-G Ratio 0.9 0.8 - 1.7     TROPONIN I    Collection Time: 08/25/19 12:50 PM   Result Value Ref Range    Troponin-I, QT <0.02 0.0 - 0.045 NG/ML   POC LACTIC ACID    Collection Time: 08/25/19 12:59 PM   Result Value Ref Range    Lactic Acid (POC) 0.87 0.40 - 2.00 mmol/L   POC VENOUS BLOOD GAS    Collection Time: 08/25/19  2:49 PM   Result Value Ref Range    Device: ROOM AIR      pH, venous (POC) 7.382 7.32 - 7.42      pCO2, venous (POC) 38.5 (L) 41 - 51 MMHG    pO2, venous (POC) 12 (L) 25 - 40 mmHg    HCO3, venous (POC) 23.0 23.0 - 28.0 MMOL/L    sO2, venous (POC) 13 (L) 65 - 88 %    Base deficit, venous (POC) 2 mmol/L    Allens test (POC) N/A      Total resp. rate 28      Site RIGHT BRACHIAL      Patient temp. 97.4      Specimen type (POC) VENOUS BLOOD      Performed by Roger Tijerina        Radiologic Studies -   Xr Chest Pa Lat    Result Date: 8/25/2019  IMPRESSION: Mild central vascular congestion with tiny effusions of heart failure        Medical Decision Making     ED Course: Progress Notes, Reevaluation, and Consults:    12:55 PM Initial assessment performed. The patients presenting problems have been discussed, and they/their family are in agreement with the care plan formulated and outlined with them. I have encouraged them to ask questions as they arise throughout their visit.         Provider Notes (Medical Decision Making):     Brandon*3, steroids and reassess  Patient still tachypneic, wheezing and SOB despite treatment with duo nebs, steroids and lasix  Refused trial of bipap for increased respiratory effort  No Co2 retention on VBG  Xray: no pneumonia but BL effusions  Patient will be admitted for COPD exacerbation in a background of CHF and only minor improvement despite treatment - Spoke to Dr Zach Cotto who accepts admission to telemetry  Vital Signs-Reviewed the patient's vital signs. Reviewed pt's pulse ox reading. EKG: Interpreted by the EP. Time Interpreted: 12:51 PM   Rate: 101   Rhythm: sinus tachycardia   Interpretation:no ST changes      Records Reviewed: Old Medical Records (Time of Review: 12:55 PM)  -I am the first provider for this patient.  -I reviewed the vital signs, available nursing notes, past medical history, past surgical history, family history and social history. Current Outpatient Medications   Medication Sig Dispense Refill    sacubitril-valsartan (ENTRESTO) 97 mg/103 mg tablet Take 1 Tab by mouth two (2) times a day. 60 Tab 6    aspirin 81 mg chewable tablet Take 2 Tabs by mouth daily. 60 Tab 0    furosemide (LASIX) 40 mg tablet Take 0.5 Tabs by mouth daily. 1 Tab 0    raNITIdine (ZANTAC) 150 mg tablet Take 1 Tab by mouth two (2) times a day. 60 Tab 4    carvedilol (COREG) 6.25 mg tablet Take 1 Tab by mouth two (2) times daily (with meals). 60 Tab 6    atorvastatin (LIPITOR) 20 mg tablet Take 1 Tab by mouth nightly. 30 Tab 0    insulin detemir (LEVEMIR) 100 unit/mL injection 15 units daily for diabetes 2 Vial 0    desloratadine (CLARINEX) 5 mg tablet Take 1 Tab by mouth daily as needed for Allergies. 30 Tab 0    mometasone (NASONEX) 50 mcg/actuation nasal spray 2 Sprays by Both Nostrils route daily as needed. For allergies 3 Container 3    umeclidinium-vilanterol (ANORO ELLIPTA) 62.5-25 mcg/actuation inhaler Take 1 Puff by inhalation daily.  For COPD 3 Inhaler 3    ferrous sulfate 325 mg (65 mg iron) tablet Take 1 Tab by mouth two (2) times a day. Indications: IRON DEFICIENCY ANEMIA 60 Tab 11        Clinical Impression     Clinical Impression:   1.  COPD exacerbation ([address]        Disposition: Jeb Horn MD  12:55 PM

## 2019-08-26 PROBLEM — J18.9 CAP (COMMUNITY ACQUIRED PNEUMONIA): Status: ACTIVE | Noted: 2019-08-26

## 2019-08-26 PROBLEM — Z99.89 OSA ON CPAP: Status: ACTIVE | Noted: 2019-08-26

## 2019-08-26 PROBLEM — G47.33 OSA ON CPAP: Status: ACTIVE | Noted: 2019-08-26

## 2019-08-26 PROBLEM — E11.65 TYPE 2 DIABETES MELLITUS WITH HYPERGLYCEMIA (HCC): Status: ACTIVE | Noted: 2019-08-26

## 2019-08-26 PROBLEM — I50.40 COMBINED SYSTOLIC AND DIASTOLIC CONGESTIVE HEART FAILURE (HCC): Status: ACTIVE | Noted: 2019-08-26

## 2019-08-26 LAB
ANION GAP SERPL CALC-SCNC: 12 MMOL/L (ref 3–18)
BASOPHILS # BLD: 0 K/UL (ref 0–0.1)
BASOPHILS NFR BLD: 0 % (ref 0–2)
BUN SERPL-MCNC: 21 MG/DL (ref 7–18)
BUN/CREAT SERPL: 16 (ref 12–20)
CALCIUM SERPL-MCNC: 8.7 MG/DL (ref 8.5–10.1)
CHLORIDE SERPL-SCNC: 111 MMOL/L (ref 100–111)
CO2 SERPL-SCNC: 22 MMOL/L (ref 21–32)
CREAT SERPL-MCNC: 1.33 MG/DL (ref 0.6–1.3)
DIFFERENTIAL METHOD BLD: ABNORMAL
EOSINOPHIL # BLD: 0 K/UL (ref 0–0.4)
EOSINOPHIL NFR BLD: 0 % (ref 0–5)
ERYTHROCYTE [DISTWIDTH] IN BLOOD BY AUTOMATED COUNT: 16.6 % (ref 11.6–14.5)
EST. AVERAGE GLUCOSE BLD GHB EST-MCNC: 143 MG/DL
GLUCOSE BLD STRIP.AUTO-MCNC: 167 MG/DL (ref 70–110)
GLUCOSE BLD STRIP.AUTO-MCNC: 191 MG/DL (ref 70–110)
GLUCOSE BLD STRIP.AUTO-MCNC: 211 MG/DL (ref 70–110)
GLUCOSE BLD STRIP.AUTO-MCNC: 324 MG/DL (ref 70–110)
GLUCOSE SERPL-MCNC: 185 MG/DL (ref 74–99)
HBA1C MFR BLD: 6.6 % (ref 4.2–5.6)
HCT VFR BLD AUTO: 27.4 % (ref 35–45)
HGB BLD-MCNC: 8.3 G/DL (ref 12–16)
LYMPHOCYTES # BLD: 0.5 K/UL (ref 0.9–3.6)
LYMPHOCYTES NFR BLD: 9 % (ref 21–52)
MCH RBC QN AUTO: 25.4 PG (ref 24–34)
MCHC RBC AUTO-ENTMCNC: 30.3 G/DL (ref 31–37)
MCV RBC AUTO: 83.8 FL (ref 74–97)
MONOCYTES # BLD: 0.1 K/UL (ref 0.05–1.2)
MONOCYTES NFR BLD: 1 % (ref 3–10)
NEUTS SEG # BLD: 4.8 K/UL (ref 1.8–8)
NEUTS SEG NFR BLD: 90 % (ref 40–73)
PLATELET # BLD AUTO: 390 K/UL (ref 135–420)
PMV BLD AUTO: 9.3 FL (ref 9.2–11.8)
POTASSIUM SERPL-SCNC: 4.1 MMOL/L (ref 3.5–5.5)
RBC # BLD AUTO: 3.27 M/UL (ref 4.2–5.3)
SODIUM SERPL-SCNC: 145 MMOL/L (ref 136–145)
WBC # BLD AUTO: 5.4 K/UL (ref 4.6–13.2)

## 2019-08-26 PROCEDURE — 74011000250 HC RX REV CODE- 250: Performed by: INTERNAL MEDICINE

## 2019-08-26 PROCEDURE — 83036 HEMOGLOBIN GLYCOSYLATED A1C: CPT

## 2019-08-26 PROCEDURE — 74011250636 HC RX REV CODE- 250/636: Performed by: NURSE PRACTITIONER

## 2019-08-26 PROCEDURE — 5A09357 ASSISTANCE WITH RESPIRATORY VENTILATION, LESS THAN 24 CONSECUTIVE HOURS, CONTINUOUS POSITIVE AIRWAY PRESSURE: ICD-10-PCS | Performed by: HOSPITALIST

## 2019-08-26 PROCEDURE — 77030013140 HC MSK NEB VYRM -A

## 2019-08-26 PROCEDURE — 80048 BASIC METABOLIC PNL TOTAL CA: CPT

## 2019-08-26 PROCEDURE — 74011636637 HC RX REV CODE- 636/637: Performed by: INTERNAL MEDICINE

## 2019-08-26 PROCEDURE — 85025 COMPLETE CBC W/AUTO DIFF WBC: CPT

## 2019-08-26 PROCEDURE — 94640 AIRWAY INHALATION TREATMENT: CPT

## 2019-08-26 PROCEDURE — 74011000258 HC RX REV CODE- 258: Performed by: INTERNAL MEDICINE

## 2019-08-26 PROCEDURE — 36415 COLL VENOUS BLD VENIPUNCTURE: CPT

## 2019-08-26 PROCEDURE — 74011250636 HC RX REV CODE- 250/636: Performed by: INTERNAL MEDICINE

## 2019-08-26 PROCEDURE — 65660000000 HC RM CCU STEPDOWN

## 2019-08-26 PROCEDURE — 82962 GLUCOSE BLOOD TEST: CPT

## 2019-08-26 PROCEDURE — 74011250637 HC RX REV CODE- 250/637: Performed by: INTERNAL MEDICINE

## 2019-08-26 PROCEDURE — 94660 CPAP INITIATION&MGMT: CPT

## 2019-08-26 RX ORDER — OXYCODONE AND ACETAMINOPHEN 5; 325 MG/1; MG/1
1 TABLET ORAL
Status: DISCONTINUED | OUTPATIENT
Start: 2019-08-26 | End: 2019-08-28 | Stop reason: HOSPADM

## 2019-08-26 RX ORDER — DEXTROSE 50 % IN WATER (D50W) INTRAVENOUS SYRINGE
25-50 AS NEEDED
Status: DISCONTINUED | OUTPATIENT
Start: 2019-08-26 | End: 2019-08-26

## 2019-08-26 RX ORDER — DEXTROSE MONOHYDRATE 100 MG/ML
125-250 INJECTION, SOLUTION INTRAVENOUS AS NEEDED
Status: DISCONTINUED | OUTPATIENT
Start: 2019-08-26 | End: 2019-08-28 | Stop reason: HOSPADM

## 2019-08-26 RX ORDER — FUROSEMIDE 10 MG/ML
20 INJECTION INTRAMUSCULAR; INTRAVENOUS EVERY 12 HOURS
Status: DISCONTINUED | OUTPATIENT
Start: 2019-08-26 | End: 2019-08-27

## 2019-08-26 RX ORDER — IPRATROPIUM BROMIDE AND ALBUTEROL SULFATE 2.5; .5 MG/3ML; MG/3ML
3 SOLUTION RESPIRATORY (INHALATION)
Status: DISCONTINUED | OUTPATIENT
Start: 2019-08-26 | End: 2019-08-28 | Stop reason: HOSPADM

## 2019-08-26 RX ORDER — NALOXONE HYDROCHLORIDE 0.4 MG/ML
0.4 INJECTION, SOLUTION INTRAMUSCULAR; INTRAVENOUS; SUBCUTANEOUS AS NEEDED
Status: DISCONTINUED | OUTPATIENT
Start: 2019-08-26 | End: 2019-08-28 | Stop reason: HOSPADM

## 2019-08-26 RX ORDER — FUROSEMIDE 20 MG/1
20 TABLET ORAL DAILY
Status: DISCONTINUED | OUTPATIENT
Start: 2019-08-26 | End: 2019-08-26

## 2019-08-26 RX ORDER — FAMOTIDINE 20 MG/1
20 TABLET, FILM COATED ORAL 2 TIMES DAILY
Status: DISCONTINUED | OUTPATIENT
Start: 2019-08-26 | End: 2019-08-28

## 2019-08-26 RX ORDER — GUAIFENESIN 100 MG/5ML
162 LIQUID (ML) ORAL DAILY
Status: DISCONTINUED | OUTPATIENT
Start: 2019-08-26 | End: 2019-08-28 | Stop reason: HOSPADM

## 2019-08-26 RX ORDER — HEPARIN SODIUM 5000 [USP'U]/ML
5000 INJECTION, SOLUTION INTRAVENOUS; SUBCUTANEOUS EVERY 8 HOURS
Status: DISCONTINUED | OUTPATIENT
Start: 2019-08-26 | End: 2019-08-28 | Stop reason: HOSPADM

## 2019-08-26 RX ORDER — MAGNESIUM SULFATE 100 %
4 CRYSTALS MISCELLANEOUS AS NEEDED
Status: DISCONTINUED | OUTPATIENT
Start: 2019-08-26 | End: 2019-08-28 | Stop reason: HOSPADM

## 2019-08-26 RX ORDER — LANOLIN ALCOHOL/MO/W.PET/CERES
325 CREAM (GRAM) TOPICAL 2 TIMES DAILY
Status: DISCONTINUED | OUTPATIENT
Start: 2019-08-26 | End: 2019-08-28 | Stop reason: HOSPADM

## 2019-08-26 RX ORDER — ONDANSETRON 2 MG/ML
4 INJECTION INTRAMUSCULAR; INTRAVENOUS
Status: DISCONTINUED | OUTPATIENT
Start: 2019-08-26 | End: 2019-08-28 | Stop reason: HOSPADM

## 2019-08-26 RX ORDER — BUDESONIDE AND FORMOTEROL FUMARATE DIHYDRATE 160; 4.5 UG/1; UG/1
2 AEROSOL RESPIRATORY (INHALATION)
Status: DISCONTINUED | OUTPATIENT
Start: 2019-08-26 | End: 2019-08-28 | Stop reason: HOSPADM

## 2019-08-26 RX ORDER — ATORVASTATIN CALCIUM 20 MG/1
20 TABLET, FILM COATED ORAL
Status: DISCONTINUED | OUTPATIENT
Start: 2019-08-26 | End: 2019-08-28 | Stop reason: HOSPADM

## 2019-08-26 RX ORDER — DOCUSATE SODIUM 100 MG/1
100 CAPSULE, LIQUID FILLED ORAL
Status: DISCONTINUED | OUTPATIENT
Start: 2019-08-26 | End: 2019-08-28 | Stop reason: HOSPADM

## 2019-08-26 RX ORDER — INSULIN LISPRO 100 [IU]/ML
INJECTION, SOLUTION INTRAVENOUS; SUBCUTANEOUS
Status: DISCONTINUED | OUTPATIENT
Start: 2019-08-26 | End: 2019-08-28 | Stop reason: HOSPADM

## 2019-08-26 RX ORDER — CARVEDILOL 6.25 MG/1
6.25 TABLET ORAL 2 TIMES DAILY WITH MEALS
Status: DISCONTINUED | OUTPATIENT
Start: 2019-08-26 | End: 2019-08-28 | Stop reason: HOSPADM

## 2019-08-26 RX ORDER — LANOLIN ALCOHOL/MO/W.PET/CERES
325 CREAM (GRAM) TOPICAL 2 TIMES DAILY
Status: DISCONTINUED | OUTPATIENT
Start: 2019-08-26 | End: 2019-08-26

## 2019-08-26 RX ORDER — ACETAMINOPHEN 325 MG/1
650 TABLET ORAL
Status: DISCONTINUED | OUTPATIENT
Start: 2019-08-26 | End: 2019-08-28 | Stop reason: HOSPADM

## 2019-08-26 RX ADMIN — CARVEDILOL 6.25 MG: 6.25 TABLET, FILM COATED ORAL at 17:48

## 2019-08-26 RX ADMIN — FAMOTIDINE 20 MG: 20 TABLET ORAL at 17:49

## 2019-08-26 RX ADMIN — DOXYCYCLINE 100 MG: 100 INJECTION, POWDER, LYOPHILIZED, FOR SOLUTION INTRAVENOUS at 14:32

## 2019-08-26 RX ADMIN — INSULIN LISPRO 2 UNITS: 100 INJECTION, SOLUTION INTRAVENOUS; SUBCUTANEOUS at 17:49

## 2019-08-26 RX ADMIN — METHYLPREDNISOLONE SODIUM SUCCINATE 40 MG: 40 INJECTION, POWDER, FOR SOLUTION INTRAMUSCULAR; INTRAVENOUS at 22:20

## 2019-08-26 RX ADMIN — CEFTRIAXONE 2 G: 2 INJECTION, POWDER, FOR SOLUTION INTRAMUSCULAR; INTRAVENOUS at 02:02

## 2019-08-26 RX ADMIN — ASPIRIN 81 MG 162 MG: 81 TABLET ORAL at 10:50

## 2019-08-26 RX ADMIN — FERROUS SULFATE TAB 325 MG (65 MG ELEMENTAL FE) 325 MG: 325 (65 FE) TAB at 10:50

## 2019-08-26 RX ADMIN — FUROSEMIDE 20 MG: 10 INJECTION, SOLUTION INTRAMUSCULAR; INTRAVENOUS at 14:32

## 2019-08-26 RX ADMIN — INSULIN LISPRO 4 UNITS: 100 INJECTION, SOLUTION INTRAVENOUS; SUBCUTANEOUS at 22:20

## 2019-08-26 RX ADMIN — FUROSEMIDE 20 MG: 10 INJECTION, SOLUTION INTRAMUSCULAR; INTRAVENOUS at 02:12

## 2019-08-26 RX ADMIN — INSULIN LISPRO 8 UNITS: 100 INJECTION, SOLUTION INTRAVENOUS; SUBCUTANEOUS at 13:16

## 2019-08-26 RX ADMIN — OXYCODONE HYDROCHLORIDE AND ACETAMINOPHEN 1 TABLET: 5; 325 TABLET ORAL at 17:57

## 2019-08-26 RX ADMIN — CARVEDILOL 6.25 MG: 6.25 TABLET, FILM COATED ORAL at 10:50

## 2019-08-26 RX ADMIN — HEPARIN SODIUM 5000 UNITS: 5000 INJECTION INTRAVENOUS; SUBCUTANEOUS at 02:02

## 2019-08-26 RX ADMIN — TIOTROPIUM BROMIDE 18 MCG: 18 CAPSULE ORAL; RESPIRATORY (INHALATION) at 08:13

## 2019-08-26 RX ADMIN — DOXYCYCLINE 100 MG: 100 INJECTION, POWDER, LYOPHILIZED, FOR SOLUTION INTRAVENOUS at 02:03

## 2019-08-26 RX ADMIN — HEPARIN SODIUM 5000 UNITS: 5000 INJECTION INTRAVENOUS; SUBCUTANEOUS at 17:49

## 2019-08-26 RX ADMIN — IPRATROPIUM BROMIDE AND ALBUTEROL SULFATE 3 ML: .5; 3 SOLUTION RESPIRATORY (INHALATION) at 11:23

## 2019-08-26 RX ADMIN — HEPARIN SODIUM 5000 UNITS: 5000 INJECTION INTRAVENOUS; SUBCUTANEOUS at 10:51

## 2019-08-26 RX ADMIN — BUDESONIDE AND FORMOTEROL FUMARATE DIHYDRATE 2 PUFF: 160; 4.5 AEROSOL RESPIRATORY (INHALATION) at 08:14

## 2019-08-26 RX ADMIN — SACUBITRIL AND VALSARTAN 1 TABLET: 97; 103 TABLET, FILM COATED ORAL at 17:48

## 2019-08-26 RX ADMIN — METHYLPREDNISOLONE SODIUM SUCCINATE 40 MG: 40 INJECTION, POWDER, FOR SOLUTION INTRAMUSCULAR; INTRAVENOUS at 10:51

## 2019-08-26 RX ADMIN — SACUBITRIL AND VALSARTAN 1 TABLET: 97; 103 TABLET, FILM COATED ORAL at 10:50

## 2019-08-26 RX ADMIN — METHYLPREDNISOLONE SODIUM SUCCINATE 40 MG: 40 INJECTION, POWDER, FOR SOLUTION INTRAMUSCULAR; INTRAVENOUS at 02:03

## 2019-08-26 RX ADMIN — FAMOTIDINE 20 MG: 20 TABLET ORAL at 10:50

## 2019-08-26 RX ADMIN — ATORVASTATIN CALCIUM 20 MG: 20 TABLET, FILM COATED ORAL at 22:20

## 2019-08-26 RX ADMIN — OXYCODONE HYDROCHLORIDE AND ACETAMINOPHEN 1 TABLET: 5; 325 TABLET ORAL at 10:50

## 2019-08-26 RX ADMIN — FERROUS SULFATE TAB 325 MG (65 MG ELEMENTAL FE) 325 MG: 325 (65 FE) TAB at 17:48

## 2019-08-26 RX ADMIN — BUDESONIDE AND FORMOTEROL FUMARATE DIHYDRATE 2 PUFF: 160; 4.5 AEROSOL RESPIRATORY (INHALATION) at 20:40

## 2019-08-26 NOTE — ROUTINE PROCESS
TRANSFER - OUT REPORT:    Verbal report given to [facility] AUTHORITY FLORENCIA Ramirez(name) on Etta Hagan  being transferred to [facility](unit) for routine progression of care       Report consisted of patients Situation, Background, Assessment and   Recommendations(SBAR). Information from the following report(s) SBAR, ED Summary, Intake/Output, MAR, Recent Results, Med Rec Status, Cardiac Rhythm sinus rhythm and Alarm Parameters  was reviewed with the receiving nurse. Lines:   Peripheral IV 08/25/19 Right Antecubital (Active)   Site Assessment Clean, dry, & intact 8/25/2019 12:50 PM   Phlebitis Assessment 0 8/25/2019 12:50 PM   Infiltration Assessment 0 8/25/2019 12:50 PM   Dressing Status Clean, dry, & intact; New 8/25/2019 12:50 PM   Dressing Type Transparent;Tape 8/25/2019 12:50 PM   Hub Color/Line Status Pink;Patent; Flushed 8/25/2019 12:50 PM   Action Taken Blood drawn 8/25/2019 12:50 PM   Alcohol Cap Used Yes 8/25/2019 12:50 PM        Opportunity for questions and clarification was provided.       Patient transported with:   Monitor   aria mccord

## 2019-08-26 NOTE — PROGRESS NOTES
[facility] Hospitalist Group  Progress Note    Patient: Joycelyn Charles Age: 81 y.o. : 1950 MR#: [id number] SSN: [id number]  Date: 2019     Subjective:     SOB improved, no sputum production, no n/v or constipation complaints; no pain complaints including to left ankle; patient reports some degree of PIERSON since Spring 2018    Assessment/Plan:   1. Acute on chronic combinded systolic and diastolic congestive heart failure - continue IV lasix / entresto; cardiology consulted   2. Acute COPD exacerbation - cont symbicort spiriva ; IV steroids reduced; cont empiric abx   3. Essential hypertension - controlled, cont entresto / BB  4. Type 2 diabetes mellitus with hyperglycemia (HCC) - cont on SSI in setting of steroid use; A1c 6.6   5. TESSA on CPAP -  Cont at bedside  6. H/o gout flare left ankle - resolved    Additional Notes:      Case discussed with:  [x]Patient  []Family  [x]Nursing  []Case Management  DVT Prophylaxis:  []Lovenox  [x]Hep SQ  []SCDs  []Coumadin   []On Heparin gtt    Objective:   VS:   Visit Vitals  /77 (BP 1 Location: Left arm, BP Patient Position: At rest)   Pulse 96   Temp 97.8 °F (36.6 °C)   Resp 18   Ht 5' 4\" (1.626 m)   Wt 99.1 kg (218 lb 8 oz)   SpO2 98%   Breastfeeding?  No   BMI 37.51 kg/m²      Tmax/24hrs: Temp (24hrs), Av.2 °F (36.8 °C), Min:97.8 °F (36.6 °C), Max:99 °F (37.2 °C)      Intake/Output Summary (Last 24 hours) at 2019 1021  Last data filed at 2019 6491  Gross per 24 hour   Intake 341 ml   Output 2300 ml   Net -1959 ml     General:  Alert, NAD  Cardiovascular:  RRR  Pulmonary: + exp wheeze; respiratory effort WNL  GI:  +BS in all four quadrants, soft, non-tender  Extremities:  No edema; 2+ dorsalis pedis pulses bilaterally  Neuro: alert and oriented x 4    Family contact:  Lulú Foreman   [phone]     Labs:    Recent Results (from the past 24 hour(s))   EKG, 12 LEAD, INITIAL    Collection Time: 19 12:43 PM Result Value Ref Range    Ventricular Rate 101 BPM    Atrial Rate 101 BPM    P-R Interval 168 ms    QRS Duration 78 ms    Q-T Interval 358 ms    QTC Calculation (Bezet) 464 ms    Calculated P Axis 71 degrees    Calculated R Axis 23 degrees    Calculated T Axis 85 degrees    Diagnosis       Sinus tachycardia  Otherwise normal ECG  When compared with ECG of 15-AUG-2019 16:26,  No significant change was found  Confirmed by Hannah Zuniga MD, ----- ([id number]) on 8/25/2019 4:13:22 PM     CBC WITH AUTOMATED DIFF    Collection Time: 08/25/19 12:50 PM   Result Value Ref Range    WBC 5.6 4.6 - 13.2 K/uL    RBC 3.20 (L) 4.20 - 5.30 M/uL    HGB 8.4 (L) 12.0 - 16.0 g/dL    HCT 28.7 (L) 35.0 - 45.0 %    MCV 89.7 74.0 - 97.0 FL    MCH 26.3 24.0 - 34.0 PG    MCHC 29.3 (L) 31.0 - 37.0 g/dL    RDW 16.3 (H) 11.6 - 14.5 %    PLATELET 374 027 - 011 K/uL    MPV 8.4 (L) 9.2 - 11.8 FL    NEUTROPHILS 64 40 - 73 %    LYMPHOCYTES 25 21 - 52 %    MONOCYTES 8 3 - 10 %    EOSINOPHILS 3 0 - 5 %    BASOPHILS 0 0 - 2 %    ABS. NEUTROPHILS 3.5 1.8 - 8.0 K/UL    ABS. LYMPHOCYTES 1.4 0.9 - 3.6 K/UL    ABS. MONOCYTES 0.5 0.05 - 1.2 K/UL    ABS. EOSINOPHILS 0.2 0.0 - 0.4 K/UL    ABS. BASOPHILS 0.0 0.0 - 0.1 K/UL    DF AUTOMATED     METABOLIC PANEL, COMPREHENSIVE    Collection Time: 08/25/19 12:50 PM   Result Value Ref Range    Sodium 147 (H) 136 - 145 mmol/L    Potassium 3.7 3.5 - 5.5 mmol/L    Chloride 115 (H) 100 - 111 mmol/L    CO2 23 21 - 32 mmol/L    Anion gap 9 3.0 - 18 mmol/L    Glucose 155 (H) 74 - 99 mg/dL    BUN 18 7.0 - 18 MG/DL    Creatinine 1.29 0.6 - 1.3 MG/DL    BUN/Creatinine ratio 14 12 - 20      GFR est AA 50 (L) >60 ml/min/1.73m2    GFR est non-AA 41 (L) >60 ml/min/1.73m2    Calcium 8.7 8.5 - 10.1 MG/DL    Bilirubin, total 0.5 0.2 - 1.0 MG/DL    ALT (SGPT) 14 13 - 56 U/L    AST (SGOT) <3 (L) 10 - 38 U/L    Alk.  phosphatase 115 45 - 117 U/L    Protein, total 6.5 6.4 - 8.2 g/dL    Albumin 3.1 (L) 3.4 - 5.0 g/dL    Globulin 3.4 2.0 - 4.0 g/dL    A-G Ratio 0.9 0.8 - 1.7     TROPONIN I    Collection Time: 08/25/19 12:50 PM   Result Value Ref Range    Troponin-I, QT <0.02 0.0 - 0.045 NG/ML   POC LACTIC ACID    Collection Time: 08/25/19 12:59 PM   Result Value Ref Range    Lactic Acid (POC) 0.87 0.40 - 2.00 mmol/L   POC VENOUS BLOOD GAS    Collection Time: 08/25/19  2:49 PM   Result Value Ref Range    Device: ROOM AIR      pH, venous (POC) 7.382 7.32 - 7.42      pCO2, venous (POC) 38.5 (L) 41 - 51 MMHG    pO2, venous (POC) 12 (L) 25 - 40 mmHg    HCO3, venous (POC) 23.0 23.0 - 28.0 MMOL/L    sO2, venous (POC) 13 (L) 65 - 88 %    Base deficit, venous (POC) 2 mmol/L    Allens test (POC) N/A      Total resp.  rate 28      Site RIGHT BRACHIAL      Patient temp. 97.4      Specimen type (POC) VENOUS BLOOD      Performed by Nidhi Leavitt [place] River[place] Awad    HEMOGLOBIN A1C WITH EAG    Collection Time: 08/26/19  4:02 AM   Result Value Ref Range    Hemoglobin A1c 6.6 (H) 4.2 - 5.6 %    Est. average glucose 836 mg/dL   METABOLIC PANEL, BASIC    Collection Time: 08/26/19  4:02 AM   Result Value Ref Range    Sodium 145 136 - 145 mmol/L    Potassium 4.1 3.5 - 5.5 mmol/L    Chloride 111 100 - 111 mmol/L    CO2 22 21 - 32 mmol/L    Anion gap 12 3.0 - 18 mmol/L    Glucose 185 (H) 74 - 99 mg/dL    BUN 21 (H) 7.0 - 18 MG/DL    Creatinine 1.33 (H) 0.6 - 1.3 MG/DL    BUN/Creatinine ratio 16 12 - 20      GFR est AA 48 (L) >60 ml/min/1.73m2    GFR est non-AA 40 (L) >60 ml/min/1.73m2    Calcium 8.7 8.5 - 10.1 MG/DL   CBC WITH AUTOMATED DIFF    Collection Time: 08/26/19  4:02 AM   Result Value Ref Range    WBC 5.4 4.6 - 13.2 K/uL    RBC 3.27 (L) 4.20 - 5.30 M/uL    HGB 8.3 (L) 12.0 - 16.0 g/dL    HCT 27.4 (L) 35.0 - 45.0 %    MCV 83.8 74.0 - 97.0 FL    MCH 25.4 24.0 - 34.0 PG    MCHC 30.3 (L) 31.0 - 37.0 g/dL    RDW 16.6 (H) 11.6 - 14.5 %    PLATELET 875 107 - 807 K/uL    MPV 9.3 9.2 - 11.8 FL    NEUTROPHILS 90 (H) 40 - 73 %    LYMPHOCYTES 9 (L) 21 - 52 %    MONOCYTES 1 (L) 3 - 10 %    EOSINOPHILS 0 0 - 5 %    BASOPHILS 0 0 - 2 %    ABS. NEUTROPHILS 4.8 1.8 - 8.0 K/UL    ABS. LYMPHOCYTES 0.5 (L) 0.9 - 3.6 K/UL    ABS. MONOCYTES 0.1 0.05 - 1.2 K/UL    ABS. EOSINOPHILS 0.0 0.0 - 0.4 K/UL    ABS.  BASOPHILS 0.0 0.0 - 0.1 K/UL    DF AUTOMATED     GLUCOSE, POC    Collection Time: 08/26/19  8:02 AM   Result Value Ref Range    Glucose (POC) 191 (H) 70 - 110 mg/dL       Signed By: Coretta Casas NP     August 26, 2019

## 2019-08-26 NOTE — DIABETES MGMT
GLYCEMIC CONTROL PLAN OF CARE     Assessment/Recommendations:  Blood glucose elevated above targets. Noted correctional Lispro insulin ordered. Pt is receiving solu-medrol 40 mg every 6 hours. Recommend addition of no concentrated sweets to cardiac diet. Continue inpatient monitoring and intervention. Most recent blood glucose values:  155, 185, 191 mg/dL    Current A1C of 6.6% is equivalent to average blood glucose of 143 mg/dl over the past 2-3 months.     Current hospital diabetes medications:   Correctional Lispro insulin 4 times daily ACHS    Home diabetes medications:  Levemir insulin 15 units daily     Diet: Cardiac    Education:  ____Refer to Diabetes Education Record             _x__Education not indicated at this time      Jacinta Shoemaker RD, CDE

## 2019-08-26 NOTE — ROUTINE PROCESS
Patient arrived via stretcher from [facility] Data base being completed  Dr. Rafiq Murray into see patient

## 2019-08-26 NOTE — H&P
History & Physical    Patient: Jaylan Marsh MRN: [id number]  CSN: [id number]    YOB: 1950  Age: 81 y.o. Sex: female      DOA: 8/25/2019    Chief Complaint:   Chief Complaint   Patient presents with    Shortness of Breath          HPI:     Jaylan Marsh is a 81 y.o.  female who has PMH of combined CHF on Entresto, CAD s/p multiple stents, COPD and Gout. Pt presented to ER with progressively worsening SOB and dry cough. Had subjective fever and chills few days prior. Pt states that she went to [facility] ER for Gouty arthritis attack in her foot. Pt was given refills to her home meds and steroids in ER and went home. Pt states that she started feeling SOB and felt sick shortly after. On exam, pt is heavily wheezing and mildly congested. Mildly tachycardiac and tachypneic   Pt will be admitted for COPD exacerbation and mild CHF exacerbation with pulmonary congestion     Past Medical History:   Diagnosis Date    Abnormal WBC count 5/17/2016    Bilateral shoulder pain 9/27/2016    Chondromalacia of both patellae     COPD (chronic obstructive pulmonary disease) ([facility] 9/2015    mild-mod dz; Dr Aron Fabry    Diabetes [facility]) 2013    Diabetic eye exam ([facility] 2016    Dilated cardiomyopathy ([facility]     Dyslipidemia     Gout     Heart attack ([facility]     Heart attack ([facility]     History of echocardiogram 11/14/2014    Mild LVE. EF 40%. Mild, diffuse hypk. Mild LAE.   Mild MR.      Hypercholesteremia 1/08/14    Hypertension 2000    Noncompliance with medications 2/16/2016    Obesity     Orthostatic hypotension 5/17/2016    Osteoarthritis of both knees     Pain management 04/01/2016    Dr. Rosenberg Labor Popliteal cyst, bilateral      Vitamin D deficiency 10/16/14       Past Surgical History:   Procedure Laterality Date    HX HEART CATHETERIZATION      HX TUBAL LIGATION         Family History   Problem Relation Age of Onset    Diabetes Mother     Hypertension Mother    De [place] Hypertension Father     Kidney Disease Maternal Aunt 48        Dialysis       Social History     Socioeconomic History    Marital status: LEGALLY      Spouse name: Not on file    Number of children: 1    Years of education: Not on file    Highest education level: Not on file   Occupational History    Occupation: retired   Tobacco Use    Smoking status: Former Smoker     Packs/day: 0.25     Years: 48.00     Pack years: 12.00     Types: Cigarettes     Last attempt to quit: 2018     Years since quittin.2    Smokeless tobacco: Never Used   Substance and Sexual Activity    Alcohol use: No     Alcohol/week: 0.0 standard drinks    Drug use: No    Sexual activity: Yes     Partners: Male   Other Topics Concern     Service No    Blood Transfusions No    Caffeine Concern No    Occupational Exposure No    Hobby Hazards No    Sleep Concern No    Stress Concern No    Weight Concern No    Special Diet No    Back Care No    Exercise No    Bike Helmet No    Seat Belt Yes    Self-Exams Yes       Prior to Admission medications    Medication Sig Start Date End Date Taking? Authorizing Provider   sacubitril-valsartan (ENTRESTO) 97 mg/103 mg tablet Take 1 Tab by mouth two (2) times a day. 19  Yes Kendra Rabago NP   aspirin 81 mg chewable tablet Take 2 Tabs by mouth daily. 19  Yes Kendra Rabago NP   furosemide (LASIX) 40 mg tablet Take 0.5 Tabs by mouth daily. 18  Yes Zee RED NP   raNITIdine (ZANTAC) 150 mg tablet Take 1 Tab by mouth two (2) times a day. 18  Yes Coleman Rodríguez NP   carvedilol (COREG) 6.25 mg tablet Take 1 Tab by mouth two (2) times daily (with meals). 18  Yes Zee RED NP   atorvastatin (LIPITOR) 20 mg tablet Take 1 Tab by mouth nightly.  18  Yes Fanny Panchal MD   insulin detemir (LEVEMIR) 100 unit/mL injection 15 units daily for diabetes 17  Yes Vielka Hdez NP desloratadine (CLARINEX) 5 mg tablet Take 1 Tab by mouth daily as needed for Allergies. 17  Yes Edie Iqra DOMINGUEZ, NP   mometasone (NASONEX) 50 mcg/actuation nasal spray 2 Sprays by Both Nostrils route daily as needed. For allergies 17  Yes Marci Irizarry, NP   umeclidinium-vilanterol (ANORO ELLIPTA) 62.5-25 mcg/actuation inhaler Take 1 Puff by inhalation daily. For COPD 17  Yes Marci Irizarry, NP   ferrous sulfate 325 mg (65 mg iron) tablet Take 1 Tab by mouth two (2) times a day. Indications: IRON DEFICIENCY ANEMIA 10/22/15  Yes Anna Raza, SONJA       No Known Allergies      Review of Systems  GENERAL: Patient alert, awake and oriented times 3, able to communicate full sentences but in distress. HEENT: No change in vision, no earache, tinnitus, sore throat or sinus congestion. NECK: No pain or stiffness. PULMONARY: +shortness of breath, cough / wheeze. Cardiovascular: no pnd / orthopnea, no CP  GASTROINTESTINAL: No abdominal pain, nausea, vomiting or diarrhea, melena or bright red blood per rectum. GENITOURINARY: No urinary frequency, urgency, hesitancy or dysuria. MUSCULOSKELETAL: No joint or muscle pain, no back pain, no recent trauma. DERMATOLOGIC: No rash, no itching, no lesions. ENDOCRINE: No polyuria, polydipsia, no heat or cold intolerance. No recent change in weight. HEMATOLOGICAL: + anemia No easy bruising or bleeding. NEUROLOGIC: No headache, seizures, numbness, tingling + weakness. Physical Exam:     Physical Exam:  Visit Vitals  /76 (BP 1 Location: Left arm, BP Patient Position: At rest)   Pulse (!) 107   Temp 98 °F (36.7 °C)   Resp 28   Ht 5' 4\" (1.626 m)   Wt 99.8 kg (220 lb)   SpO2 97%   Breastfeeding?  No   BMI 37.76 kg/m²      O2 Device: Room air    Temp (24hrs), Av.1 °F (36.7 °C), Min:98 °F (36.7 °C), Max:98.2 °F (36.8 °C)     1901 - 700  In: -   Out: 550 [Urine:550]   701 - 1900  In: 221 [P.O.:221]  Out: [place] [[facility]:[id number]]    General:  Alert, cooperative, mild distress, appears stated age. Head: Normocephalic, without obvious abnormality, atraumatic. Eyes:  Conjunctivae/corneas clear. PERRL, EOMs intact. Nose: Nares normal. No drainage or sinus tenderness. Neck: Supple, symmetrical, trachea midline, no adenopathy, thyroid: no enlargement, no carotid bruit and no JVD. Lungs:   decrease BS with heavy wheezing and rhonchi   Heart:  Regular rate and rhythm, S1, S2 tachy     Abdomen: Soft, non-tender. Bowel sounds normal.    Extremities: Extremities normal, atraumatic, no cyanosis or edema. Pulses: 2+ and symmetric all extremities. Skin:  No rashes or lesions   Neurologic: AAOx3, No focal motor or sensory deficit. Labs Reviewed:    Lab results reviewed. For significant abnormal values and values requiring intervention, see assessment and plan. CXR and EKG    Procedures/imaging: see electronic medical records for all procedures/Xrays and details which were not copied into this note but were reviewed prior to creation of Plan      Assessment/Plan     Principal Problem:    COPD exacerbation ([facility] (8/25/2019)    Active Problems:    Essential hypertension (9/17/2015)      CAP (community acquired pneumonia) (8/26/2019)      Combined systolic and diastolic congestive heart failure ([facility] (8/26/2019)      Type 2 diabetes mellitus with hyperglycemia ([facility] [address] (8/26/2019)      TESSA on CPAP (8/26/2019)         Pt will be admitted to [facility] for COPD exacerbation 2 ry to CAP  Has Combined CHF on Entresto with pulmonary congestion   HTN  DM  Gout   Iron def. Anemia       Will continue IV steroids, Bronchodilators  Empiric antibiotics and Mucolytic's. Blood cultures and sensitivity. If patient does not respond to the above measures will get ABG and will initiate NPPV.   Will continue home meds for CHF  Changing lasix to IV BID   Cardiology consult in AM  SSI   Continue Ferrous sulfate       DVT/GI Prophylaxis: Hep SQ    Plan of care is discussed in details with Patient/Family at bedside and agreed upon    Jj Celaya MD  8/26/2019 12:49 AM

## 2019-08-26 NOTE — PROGRESS NOTES
conducted an initial consultation and Spiritual Assessment for Fredrick Lowe, who is a 81 y.o.,female. Patients Primary Language is: [place]. According to the patients EMR Lutheran Affiliation is: No preference.      The reason the Patient came to the hospital is:   Patient Active Problem List    Diagnosis Date Noted    CAP (community acquired pneumonia) 08/26/2019    Combined systolic and diastolic congestive heart failure ([address] 08/26/2019    Type 2 diabetes mellitus with hyperglycemia ([address] 08/26/2019    TESSA on CPAP 08/26/2019    COPD exacerbation ([address] 08/25/2019    Type 2 diabetes with nephropathy ([address] 07/12/2018    Severe obesity (BMI 35.0-39.9) 07/12/2018    Chest pain 06/09/2018    SOB (shortness of breath) 06/09/2018    Coronary artery disease involving native coronary artery with unstable angina pectoris ([address] 06/09/2018    Pulmonary edema 06/08/2018    Coronary artery disease involving native coronary artery of native heart without angina pectoris 05/31/2018    STEMI (ST elevation myocardial infarction) ([address] 05/20/2018    Acute pulmonary edema ([address] 05/20/2018    Caregiver stress 04/26/2017    Bilateral shoulder pain 09/27/2016    Elevated alkaline phosphatase level 09/27/2016    Chronic obstructive pulmonary disease ([address] 05/17/2016    Compliance with medication regimen 05/17/2016    Chronic pain of both knees 04/13/2016    Chronic pain syndrome 04/13/2016    Primary osteoarthritis of both knees 04/13/2016    Iron deficiency anemia 10/22/2015    Decreased GFR 10/22/2015    Dyslipidemia, goal LDL below 70 10/22/2015    Former smoker 10/22/2015    Osteoarthritis of both knees     Essential hypertension 09/17/2015    COPD (chronic obstructive pulmonary disease) ([address] 09/01/2015    Diabetes mellitus type 2, insulin dependent ([address] 07/02/2015    Environmental allergies 04/01/2015    Cardiomyopathy, dilated ([address] 12/31/2014        The  provided the following Interventions:  Initiated a relationship of care and support. Provided information about Spiritual Care Services. Chart reviewed. Assessment:  Patient was not up for a long visit. Patient does not have any Protestant/cultural needs that will affect patients preferences in health care. Plan:  Chaplains will continue to follow and will provide pastoral care on an as needed/requested basis.  recommends bedside caregivers page  on duty if patient shows signs of acute spiritual or emotional distress.     [address]  [phone])

## 2019-08-26 NOTE — CONSULTS
[facility] - Consult Note    Date of  Admission: 8/25/2019 12:37 PM     Primary Care Physician:  Pérez Hobbs MD     Plan:       1. Acute on Chronic Heart failure with mild systolic dysfunction exacerbation- she is now nearing her baseline and feeling better since admission. Will continue with lasix iv bid .  monitor closely renal function and electrolytes as her EF% has improved may need to d/c Entresto. 2. CAD s/p YADIRA to proximal LAD with RESOLUTE stent 5/20/2018. Acute stent thrombosis shortly after initial PCI on 5/20/2018 requiring emergency intubation for pulmonary edema and repeat cardiac cath and additional PCI/YADIRA to prox LAD. Jessica Alonzo was d/c as stents were placed over 1 year ago. 3. Hx of Ischemic cardiomyopathy- with EF 35% on echo 5/21/2018  with elevated pulmonary pressures at 44mm Hg- now with mild systolic Dysfunction Recent Echo 8/7/29 showed EF% 45%-50%  4. Hypertension- stable  on Entresto and bb   5. DM Type II- medications per medical team   6. Dyslipidemia- on Lipitor 20 mg. LDL 80 on 8/19   7. COPD exacerbation- managed by medical team   8. Hx of Tobacco abuse with   1 ppd X 50 years- patient quit in 2018  9. Obesity- weight loss advised  10. TESSA- uses CPAP at night     Once COPD exacerbation improves, will consider nuclear stress test.       08/07/19   ECHO ADULT COMPLETE 08/07/2019 8/7/2019    Narrative · Left Ventricle: Normal cavity size and wall thickness. Mild systolic   dysfunction. Estimated left ventricular ejection fraction is 46 - 50%. Abnormal left ventricular wall motion. Moderate (grade 2) left ventricular   diastolic dysfunction. · Left Atrium: Moderately dilated left atrium. · Aortic Valve: Aortic valve sclerosis. · Mitral Valve: Mitral valve thickening. Moderate to severe mitral valve   regurgitation. · Tricuspid Valve: Mild tricuspid valve regurgitation is present. Pulmonary arterial systolic pressure is 96.4 mmHg.  Moderate pulmonary hypertension. · Pulmonic Valve: Mild pulmonic valve regurgitation is present. · IVC/Hepatic Veins: Severely elevated central venous pressure (15+ mmHg);   IVC diameter is larger than 21 mm and collapses less than 50% with   respiration. Signed by: Kristian Fu MD               XR Results (most recent):  Results from Hospital Encounter encounter on 08/25/19   XR CHEST PA LAT    Narrative EXAM: CHEST PA AND LATERAL    CLINICAL HISTORY/INDICATION:  SOB onset evening prior to arrival, associated  with cough, history of COPD     COMPARISON: Chest x-ray August 15, 2019, January 21, 2019. TECHNIQUE: PA and lateral views     FINDINGS:      The cardiac and mediastinal silhouette is mildly enlarged. The pulmonary  vessels are cephalized and ill-defined. Minimal blunting of the costophrenic  angles posteriorly. Mild disc space narrowing with marginal spurring involves  the mid and lower thoracic spine. Impression IMPRESSION:    Mild central vascular congestion with tiny effusions of heart failure            Assessment:     Hospital Problems  Date Reviewed: 8/13/2019          Codes Class Noted POA    CAP (community acquired pneumonia) ICD-10-CM: J18.9  ICD-9-CM: 486  8/26/2019 Unknown        Combined systolic and diastolic congestive heart failure ([facility] ICD-10-CM: I50.40  ICD-9-CM: 428.40  8/26/2019 Unknown        Type 2 diabetes mellitus with hyperglycemia (HCC) ICD-10-CM: E11.65  ICD-9-CM: 250.00  8/26/2019 Unknown        TESSA on CPAP ICD-10-CM: G47.33, Z99.89  ICD-9-CM: 327.23, V46.8  8/26/2019 Unknown        * (Principal) COPD exacerbation ([facility] ICD-10-CM: J44.1  ICD-9-CM: 491.21  8/25/2019 Yes        Essential hypertension ICD-10-CM: I10  ICD-9-CM: 401.9  9/17/2015 Yes                   History of Present Illness: This is a 81 y.o. female admitted for COPD exacerbation ([facility] [J44.1]. Cass Olivera is a 81 y.o.   female who has PMH of combined CHF on Entresto, CAD s/p multiple stents, COPD and Gout. The patient presented to ER with progressively worsening SOB and dry cough. Had subjective fever and chills few days prior. She states that she went to [facility] ER for Gouty arthritis attack in her foot. Patient was given refills to her home meds and steroids in ER and went home. Patient states that she started feeling SOB and felt sick shortly after. In the ED she was note with  heavily wheezing and mildly congested. Mildly tachycardiac and tachypnea. Patient  for COPD exacerbation and mild CHF exacerbation with pulmonary congestion. She is resting comfortable in bed no distress noted. No chest pain or chest presure. Has SOB that is slowly improving. C/o non productive cough. No fever or chills no palpitations no leg edema            Past Medical History:     Past Medical History:   Diagnosis Date    Abnormal WBC count 5/17/2016    Bilateral shoulder pain 9/27/2016    Chondromalacia of both patellae     COPD (chronic obstructive pulmonary disease) ([facility] 9/2015    mild-mod dz; Dr Rajiv Herron    Diabetes [facility]) 2013    Diabetic eye exam ([facility] 2016    Dilated cardiomyopathy ([facility]     Dyslipidemia     Gout     Heart attack ([facility]     Heart attack ([facility]     History of echocardiogram 11/14/2014    Mild LVE. EF 40%. Mild, diffuse hypk. Mild LAE.   Mild MR.      Hypercholesteremia 1/08/14    Hypertension 2000    Noncompliance with medications 2/16/2016    Obesity     Orthostatic hypotension 5/17/2016    Osteoarthritis of both knees     Pain management 04/01/2016    Dr. Anastasia Kyle Popliteal cyst, bilateral      Vitamin D deficiency 10/16/14         Social History:     Social History     Socioeconomic History    Marital status: LEGALLY      Spouse name: Not on file    Number of children: 3    Years of education: Not on file    Highest education level: Not on file   Occupational History    Occupation: retired   Tobacco Use    Smoking status: Former Smoker Packs/day: 0.25     Years: 48.00     Pack years: 12.00     Types: Cigarettes     Last attempt to quit: 2018     Years since quittin.2    Smokeless tobacco: Never Used   Substance and Sexual Activity    Alcohol use: No     Alcohol/week: 0.0 standard drinks    Drug use: No    Sexual activity: Yes     Partners: Male   Other Topics Concern     Service No    Blood Transfusions No    Caffeine Concern No    Occupational Exposure No    Hobby Hazards No    Sleep Concern No    Stress Concern No    Weight Concern No    Special Diet No    Back Care No    Exercise No    Bike Helmet No    Seat Belt Yes    Self-Exams Yes        Family History:     Family History   Problem Relation Age of Onset    Diabetes Mother     Hypertension Mother     Hypertension Father     Kidney Disease Maternal Aunt 50        Dialysis        Medications:   No Known Allergies     Current Facility-Administered Medications   Medication Dose Route Frequency    methylPREDNISolone (PF) (SOLU-MEDROL) injection 40 mg  40 mg IntraVENous Q6H    albuterol-ipratropium (DUO-NEB) 2.5 MG-0.5 MG/3 ML  3 mL Nebulization Q6H PRN    budesonide-formoterol (SYMBICORT) 160-4.5 mcg/actuation HFA inhaler 2 Puff  2 Puff Inhalation BID RT    tiotropium (SPIRIVA) inhalation capsule 18 mcg  1 Cap Inhalation QAM RT    cefTRIAXone (ROCEPHIN) 2 g in sterile water (preservative free) 20 mL IV syringe  2 g IntraVENous Q24H    doxycycline (VIBRAMYCIN) 100 mg in 0.9% sodium chloride (MBP/ADV) 100 mL MBP  100 mg IntraVENous Q12H    aspirin chewable tablet 162 mg  162 mg Oral DAILY    atorvastatin (LIPITOR) tablet 20 mg  20 mg Oral QHS    carvedilol (COREG) tablet 6.25 mg  6.25 mg Oral BID WITH MEALS    sacubitril-valsartan (ENTRESTO)  mg tablet 1 Tab  1 Tab Oral BID    famotidine (PEPCID) tablet 20 mg  20 mg Oral BID    acetaminophen (TYLENOL) tablet 650 mg  650 mg Oral Q6H PRN    oxyCODONE-acetaminophen (PERCOCET) 5-325 mg per tablet 1 Tab  1 Tab Oral Q6H PRN    naloxone (NARCAN) injection 0.4 mg  0.4 mg IntraVENous PRN    ondansetron (ZOFRAN) injection 4 mg  4 mg IntraVENous Q6H PRN    docusate sodium (COLACE) capsule 100 mg  100 mg Oral BID PRN    insulin lispro (HUMALOG) injection   SubCUTAneous AC&HS    glucose chewable tablet 16 g  4 Tab Oral PRN    glucagon (GLUCAGEN) injection 1 mg  1 mg IntraMUSCular PRN    heparin (porcine) injection 5,000 Units  5,000 Units SubCUTAneous Q8H    furosemide (LASIX) injection 20 mg  20 mg IntraVENous Q12H    ferrous sulfate tablet 325 mg  325 mg Oral BID    dextrose 10% infusion 125-250 mL  125-250 mL IntraVENous PRN        Review Of Systems:       Constitutional: No fever, no chills, no weight loss, no night sweats   HEENT: No epistaxis, no nasal drainage, no difficulty in swallowing, no redness in eyes  Respiratory:  positive for cough,wheezing, dyspnea on exertion   Cardiovascular: dyspnea  Gastrointestinal: no abd pain, no vomiting, no diarrhea, no bleeding symptoms  Genitourinary: No urinary symptoms or hematuria  Integument/breast: No ulcers or rashes  Musculoskeletal: no muscle pain, no weakness  Neurological: No focal weakness, no seizures, no headaches  Behvioral/Psych: No anxiety, no depression         Physical Exam:     Visit Vitals  /66 (BP 1 Location: Left arm, BP Patient Position: At rest)   Pulse (!) 106   Temp 98.8 °F (37.1 °C)   Resp 18   Ht 5' 4\" (1.626 m)   Wt 99.1 kg (218 lb 8 oz)   SpO2 100%   Breastfeeding?  No   BMI 37.51 kg/m²     BP Readings from Last 3 Encounters:   08/26/19 119/66   08/15/19 149/70   08/13/19 172/87     Pulse Readings from Last 3 Encounters:   08/26/19 (!) 106   08/15/19 86   08/13/19 84     Wt Readings from Last 3 Encounters:   08/26/19 99.1 kg (218 lb 8 oz)   08/15/19 99.8 kg (220 lb)   08/13/19 103 kg (227 lb)       General:  alert, cooperative, no distress, appears stated age, morbidly obese  Skin: Warm and dry, acyanotic, normal color.  Head: Normocephalic, atraumatic. Eyes: Sclerae anicteric, conjunctivae without injection. Neck:  nontender, no nuchal rigidity, no masses, no stridor, no carotid bruit, unable to assess JVD due to obesity   Lungs:  very diminished breath sounds b/l. Inspiratory rales lung bases b/l. Heart:  regular rate and rhythm, S1, S2 normal, no click, no rub  Abdomen:  abdomen is soft without significant tenderness, masses, organomegaly or guarding  Extremities:  extremities normal, atraumatic, no cyanosis. No edema. Peripheral pulses present   Neurological: grossly intact. No focal abnormalities, moves all extremities well. Psychiatric Affect: The patient is awake, alert and oriented x3. Otilio Po is interactive and appropriate. Data Review:     Recent Results (from the past 48 hour(s))   EKG, 12 LEAD, INITIAL    Collection Time: 08/25/19 12:43 PM   Result Value Ref Range    Ventricular Rate 101 BPM    Atrial Rate 101 BPM    P-R Interval 168 ms    QRS Duration 78 ms    Q-T Interval 358 ms    QTC Calculation (Bezet) 464 ms    Calculated P Axis 71 degrees    Calculated R Axis 23 degrees    Calculated T Axis 85 degrees    Diagnosis       Sinus tachycardia  Otherwise normal ECG  When compared with ECG of 15-AUG-2019 16:26,  No significant change was found  Confirmed by Krystyna Camacho MD, ----- ([id number]) on 8/25/2019 4:13:22 PM     CBC WITH AUTOMATED DIFF    Collection Time: 08/25/19 12:50 PM   Result Value Ref Range    WBC 5.6 4.6 - 13.2 K/uL    RBC 3.20 (L) 4.20 - 5.30 M/uL    HGB 8.4 (L) 12.0 - 16.0 g/dL    HCT 28.7 (L) 35.0 - 45.0 %    MCV 89.7 74.0 - 97.0 FL    MCH 26.3 24.0 - 34.0 PG    MCHC 29.3 (L) 31.0 - 37.0 g/dL    RDW 16.3 (H) 11.6 - 14.5 %    PLATELET 560 359 - 692 K/uL    MPV 8.4 (L) 9.2 - 11.8 FL    NEUTROPHILS 64 40 - 73 %    LYMPHOCYTES 25 21 - 52 %    MONOCYTES 8 3 - 10 %    EOSINOPHILS 3 0 - 5 %    BASOPHILS 0 0 - 2 %    ABS. NEUTROPHILS 3.5 1.8 - 8.0 K/UL    ABS.  LYMPHOCYTES 1.4 0.9 - 3.6 K/UL ABS. MONOCYTES 0.5 0.05 - 1.2 K/UL    ABS. EOSINOPHILS 0.2 0.0 - 0.4 K/UL    ABS. BASOPHILS 0.0 0.0 - 0.1 K/UL    DF AUTOMATED     METABOLIC PANEL, COMPREHENSIVE    Collection Time: 08/25/19 12:50 PM   Result Value Ref Range    Sodium 147 (H) 136 - 145 mmol/L    Potassium 3.7 3.5 - 5.5 mmol/L    Chloride 115 (H) 100 - 111 mmol/L    CO2 23 21 - 32 mmol/L    Anion gap 9 3.0 - 18 mmol/L    Glucose 155 (H) 74 - 99 mg/dL    BUN 18 7.0 - 18 MG/DL    Creatinine 1.29 0.6 - 1.3 MG/DL    BUN/Creatinine ratio 14 12 - 20      GFR est AA 50 (L) >60 ml/min/1.73m2    GFR est non-AA 41 (L) >60 ml/min/1.73m2    Calcium 8.7 8.5 - 10.1 MG/DL    Bilirubin, total 0.5 0.2 - 1.0 MG/DL    ALT (SGPT) 14 13 - 56 U/L    AST (SGOT) <3 (L) 10 - 38 U/L    Alk. phosphatase 115 45 - 117 U/L    Protein, total 6.5 6.4 - 8.2 g/dL    Albumin 3.1 (L) 3.4 - 5.0 g/dL    Globulin 3.4 2.0 - 4.0 g/dL    A-G Ratio 0.9 0.8 - 1.7     TROPONIN I    Collection Time: 08/25/19 12:50 PM   Result Value Ref Range    Troponin-I, QT <0.02 0.0 - 0.045 NG/ML   POC LACTIC ACID    Collection Time: 08/25/19 12:59 PM   Result Value Ref Range    Lactic Acid (POC) 0.87 0.40 - 2.00 mmol/L   POC VENOUS BLOOD GAS    Collection Time: 08/25/19  2:49 PM   Result Value Ref Range    Device: ROOM AIR      pH, venous (POC) 7.382 7.32 - 7.42      pCO2, venous (POC) 38.5 (L) 41 - 51 MMHG    pO2, venous (POC) 12 (L) 25 - 40 mmHg    HCO3, venous (POC) 23.0 23.0 - 28.0 MMOL/L    sO2, venous (POC) 13 (L) 65 - 88 %    Base deficit, venous (POC) 2 mmol/L    Allens test (POC) N/A      Total resp.  rate 28      Site RIGHT BRACHIAL      Patient temp. 97.4      Specimen type (POC) VENOUS BLOOD      Performed by Jimy Awad    HEMOGLOBIN A1C WITH EAG    Collection Time: 08/26/19  4:02 AM   Result Value Ref Range    Hemoglobin A1c 6.6 (H) 4.2 - 5.6 %    Est. average glucose 644 mg/dL   METABOLIC PANEL, BASIC    Collection Time: 08/26/19  4:02 AM   Result Value Ref Range    Sodium 145 136 - 145 mmol/L    Potassium 4.1 3.5 - 5.5 mmol/L    Chloride 111 100 - 111 mmol/L    CO2 22 21 - 32 mmol/L    Anion gap 12 3.0 - 18 mmol/L    Glucose 185 (H) 74 - 99 mg/dL    BUN 21 (H) 7.0 - 18 MG/DL    Creatinine 1.33 (H) 0.6 - 1.3 MG/DL    BUN/Creatinine ratio 16 12 - 20      GFR est AA 48 (L) >60 ml/min/1.73m2    GFR est non-AA 40 (L) >60 ml/min/1.73m2    Calcium 8.7 8.5 - 10.1 MG/DL   CBC WITH AUTOMATED DIFF    Collection Time: 08/26/19  4:02 AM   Result Value Ref Range    WBC 5.4 4.6 - 13.2 K/uL    RBC 3.27 (L) 4.20 - 5.30 M/uL    HGB 8.3 (L) 12.0 - 16.0 g/dL    HCT 27.4 (L) 35.0 - 45.0 %    MCV 83.8 74.0 - 97.0 FL    MCH 25.4 24.0 - 34.0 PG    MCHC 30.3 (L) 31.0 - 37.0 g/dL    RDW 16.6 (H) 11.6 - 14.5 %    PLATELET 047 078 - 500 K/uL    MPV 9.3 9.2 - 11.8 FL    NEUTROPHILS 90 (H) 40 - 73 %    LYMPHOCYTES 9 (L) 21 - 52 %    MONOCYTES 1 (L) 3 - 10 %    EOSINOPHILS 0 0 - 5 %    BASOPHILS 0 0 - 2 %    ABS. NEUTROPHILS 4.8 1.8 - 8.0 K/UL    ABS. LYMPHOCYTES 0.5 (L) 0.9 - 3.6 K/UL    ABS. MONOCYTES 0.1 0.05 - 1.2 K/UL    ABS. EOSINOPHILS 0.0 0.0 - 0.4 K/UL    ABS.  BASOPHILS 0.0 0.0 - 0.1 K/UL    DF AUTOMATED     GLUCOSE, POC    Collection Time: 08/26/19  8:02 AM   Result Value Ref Range    Glucose (POC) 191 (H) 70 - 110 mg/dL   GLUCOSE, POC    Collection Time: 08/26/19 11:45 AM   Result Value Ref Range    Glucose (POC) 324 (H) 70 - 110 mg/dL         Intake/Output Summary (Last 24 hours) at 8/26/2019 1240  Last data filed at 8/26/2019 0615  Gross per 24 hour   Intake 341 ml   Output 2300 ml   Net -1959 ml       Cardiographics:       EKG Results     Procedure 720 Value Units Date/Time    EKG, 12 LEAD, INITIAL [[id number]] Collected:  08/25/19 1243    Order Status:  Completed Updated:  08/25/19 1613     Ventricular Rate 101 BPM      Atrial Rate 101 BPM      P-R Interval 168 ms      QRS Duration 78 ms      Q-T Interval 358 ms      QTC Calculation (Bezet) 464 ms      Calculated P Axis 71 degrees      Calculated R Axis 23 degrees      Calculated T Axis 85 degrees      Diagnosis --     Sinus tachycardia  Otherwise normal ECG  When compared with ECG of 15-AUG-2019 16:26,  No significant change was found  Confirmed by Rupert Avery MD, ----- ([id number]) on 8/25/2019 4:13:22 PM          08/07/19   ECHO ADULT COMPLETE 08/07/2019 8/7/2019    Narrative · Left Ventricle: Normal cavity size and wall thickness. Mild systolic   dysfunction. Estimated left ventricular ejection fraction is 46 - 50%. Abnormal left ventricular wall motion. Moderate (grade 2) left ventricular   diastolic dysfunction. · Left Atrium: Moderately dilated left atrium. · Aortic Valve: Aortic valve sclerosis. · Mitral Valve: Mitral valve thickening. Moderate to severe mitral valve   regurgitation. · Tricuspid Valve: Mild tricuspid valve regurgitation is present. Pulmonary arterial systolic pressure is 36.0 mmHg. Moderate pulmonary   hypertension. · Pulmonic Valve: Mild pulmonic valve regurgitation is present. · IVC/Hepatic Veins: Severely elevated central venous pressure (15+ mmHg);   IVC diameter is larger than 21 mm and collapses less than 50% with   respiration. Signed by: Karen Gonsalves MD         Signed By: Maria Del Carmen MA Phone [phone] supervised    August 26, 2019      I have independently evaluated and examined the patient. All relevant labs and testing data's are reviewed. Care plan discussed and updated after review.     Omid Castillo MD

## 2019-08-26 NOTE — ROUTINE PROCESS
Verbal bedside shift report given to Alice Mackenzie, RN oncoming nurse by Carl James RN off going nurse including [facility], [facility] and [facility]

## 2019-08-27 LAB
ANION GAP SERPL CALC-SCNC: 9 MMOL/L (ref 3–18)
BASOPHILS # BLD: 0 K/UL (ref 0–0.1)
BASOPHILS NFR BLD: 0 % (ref 0–2)
BUN SERPL-MCNC: 35 MG/DL (ref 7–18)
BUN/CREAT SERPL: 21 (ref 12–20)
CALCIUM SERPL-MCNC: 8.6 MG/DL (ref 8.5–10.1)
CHLORIDE SERPL-SCNC: 109 MMOL/L (ref 100–111)
CO2 SERPL-SCNC: 22 MMOL/L (ref 21–32)
CREAT SERPL-MCNC: 1.68 MG/DL (ref 0.6–1.3)
DIFFERENTIAL METHOD BLD: ABNORMAL
EOSINOPHIL # BLD: 0 K/UL (ref 0–0.4)
EOSINOPHIL NFR BLD: 0 % (ref 0–5)
ERYTHROCYTE [DISTWIDTH] IN BLOOD BY AUTOMATED COUNT: 16.8 % (ref 11.6–14.5)
GLUCOSE BLD STRIP.AUTO-MCNC: 177 MG/DL (ref 70–110)
GLUCOSE BLD STRIP.AUTO-MCNC: 199 MG/DL (ref 70–110)
GLUCOSE BLD STRIP.AUTO-MCNC: 224 MG/DL (ref 70–110)
GLUCOSE BLD STRIP.AUTO-MCNC: 255 MG/DL (ref 70–110)
GLUCOSE SERPL-MCNC: 185 MG/DL (ref 74–99)
HCT VFR BLD AUTO: 26.3 % (ref 35–45)
HGB BLD-MCNC: 8 G/DL (ref 12–16)
LYMPHOCYTES # BLD: 0.7 K/UL (ref 0.9–3.6)
LYMPHOCYTES NFR BLD: 7 % (ref 21–52)
MAGNESIUM SERPL-MCNC: 1.9 MG/DL (ref 1.6–2.6)
MCH RBC QN AUTO: 25.6 PG (ref 24–34)
MCHC RBC AUTO-ENTMCNC: 30.4 G/DL (ref 31–37)
MCV RBC AUTO: 84.3 FL (ref 74–97)
MONOCYTES # BLD: 0.4 K/UL (ref 0.05–1.2)
MONOCYTES NFR BLD: 4 % (ref 3–10)
NEUTS SEG # BLD: 9.5 K/UL (ref 1.8–8)
NEUTS SEG NFR BLD: 89 % (ref 40–73)
PHOSPHATE SERPL-MCNC: 3.7 MG/DL (ref 2.5–4.9)
PLATELET # BLD AUTO: 381 K/UL (ref 135–420)
PMV BLD AUTO: 9 FL (ref 9.2–11.8)
POTASSIUM SERPL-SCNC: 4.4 MMOL/L (ref 3.5–5.5)
RBC # BLD AUTO: 3.12 M/UL (ref 4.2–5.3)
SODIUM SERPL-SCNC: 140 MMOL/L (ref 136–145)
WBC # BLD AUTO: 10.6 K/UL (ref 4.6–13.2)

## 2019-08-27 PROCEDURE — 74011000250 HC RX REV CODE- 250: Performed by: HOSPITALIST

## 2019-08-27 PROCEDURE — 94760 N-INVAS EAR/PLS OXIMETRY 1: CPT

## 2019-08-27 PROCEDURE — 65660000000 HC RM CCU STEPDOWN

## 2019-08-27 PROCEDURE — 74011000258 HC RX REV CODE- 258: Performed by: INTERNAL MEDICINE

## 2019-08-27 PROCEDURE — 74011250637 HC RX REV CODE- 250/637: Performed by: INTERNAL MEDICINE

## 2019-08-27 PROCEDURE — 74011250636 HC RX REV CODE- 250/636: Performed by: NURSE PRACTITIONER

## 2019-08-27 PROCEDURE — 94640 AIRWAY INHALATION TREATMENT: CPT

## 2019-08-27 PROCEDURE — 36415 COLL VENOUS BLD VENIPUNCTURE: CPT

## 2019-08-27 PROCEDURE — 83735 ASSAY OF MAGNESIUM: CPT

## 2019-08-27 PROCEDURE — 74011250636 HC RX REV CODE- 250/636: Performed by: INTERNAL MEDICINE

## 2019-08-27 PROCEDURE — 74011636637 HC RX REV CODE- 636/637: Performed by: HOSPITALIST

## 2019-08-27 PROCEDURE — 85025 COMPLETE CBC W/AUTO DIFF WBC: CPT

## 2019-08-27 PROCEDURE — 84100 ASSAY OF PHOSPHORUS: CPT

## 2019-08-27 PROCEDURE — 82962 GLUCOSE BLOOD TEST: CPT

## 2019-08-27 PROCEDURE — 74011000250 HC RX REV CODE- 250: Performed by: INTERNAL MEDICINE

## 2019-08-27 PROCEDURE — 94660 CPAP INITIATION&MGMT: CPT

## 2019-08-27 PROCEDURE — 74011636637 HC RX REV CODE- 636/637: Performed by: INTERNAL MEDICINE

## 2019-08-27 PROCEDURE — 80048 BASIC METABOLIC PNL TOTAL CA: CPT

## 2019-08-27 RX ORDER — IPRATROPIUM BROMIDE AND ALBUTEROL SULFATE 2.5; .5 MG/3ML; MG/3ML
3 SOLUTION RESPIRATORY (INHALATION)
Status: DISCONTINUED | OUTPATIENT
Start: 2019-08-27 | End: 2019-08-28 | Stop reason: HOSPADM

## 2019-08-27 RX ORDER — FUROSEMIDE 40 MG/1
40 TABLET ORAL DAILY
Status: DISCONTINUED | OUTPATIENT
Start: 2019-08-27 | End: 2019-08-28 | Stop reason: HOSPADM

## 2019-08-27 RX ORDER — PREDNISONE 20 MG/1
40 TABLET ORAL
Status: DISCONTINUED | OUTPATIENT
Start: 2019-08-28 | End: 2019-08-28 | Stop reason: HOSPADM

## 2019-08-27 RX ORDER — TRAMADOL HYDROCHLORIDE 50 MG/1
50 TABLET ORAL 3 TIMES DAILY
Status: COMPLETED | OUTPATIENT
Start: 2019-08-27 | End: 2019-08-27

## 2019-08-27 RX ADMIN — TRAMADOL HYDROCHLORIDE 50 MG: 50 TABLET, COATED ORAL at 18:41

## 2019-08-27 RX ADMIN — DOXYCYCLINE 100 MG: 100 INJECTION, POWDER, LYOPHILIZED, FOR SOLUTION INTRAVENOUS at 18:48

## 2019-08-27 RX ADMIN — HEPARIN SODIUM 5000 UNITS: 5000 INJECTION INTRAVENOUS; SUBCUTANEOUS at 10:48

## 2019-08-27 RX ADMIN — IPRATROPIUM BROMIDE AND ALBUTEROL SULFATE 3 ML: .5; 3 SOLUTION RESPIRATORY (INHALATION) at 13:36

## 2019-08-27 RX ADMIN — SACUBITRIL AND VALSARTAN 1 TABLET: 97; 103 TABLET, FILM COATED ORAL at 09:19

## 2019-08-27 RX ADMIN — FERROUS SULFATE TAB 325 MG (65 MG ELEMENTAL FE) 325 MG: 325 (65 FE) TAB at 09:19

## 2019-08-27 RX ADMIN — FAMOTIDINE 20 MG: 20 TABLET ORAL at 09:19

## 2019-08-27 RX ADMIN — IPRATROPIUM BROMIDE AND ALBUTEROL SULFATE 3 ML: .5; 3 SOLUTION RESPIRATORY (INHALATION) at 20:21

## 2019-08-27 RX ADMIN — INSULIN LISPRO 2 UNITS: 100 INJECTION, SOLUTION INTRAVENOUS; SUBCUTANEOUS at 09:17

## 2019-08-27 RX ADMIN — INSULIN LISPRO 9 UNITS: 100 INJECTION, SOLUTION INTRAVENOUS; SUBCUTANEOUS at 12:59

## 2019-08-27 RX ADMIN — FUROSEMIDE 20 MG: 10 INJECTION, SOLUTION INTRAMUSCULAR; INTRAVENOUS at 04:25

## 2019-08-27 RX ADMIN — FERROUS SULFATE TAB 325 MG (65 MG ELEMENTAL FE) 325 MG: 325 (65 FE) TAB at 18:40

## 2019-08-27 RX ADMIN — TRAMADOL HYDROCHLORIDE 50 MG: 50 TABLET, COATED ORAL at 22:00

## 2019-08-27 RX ADMIN — INSULIN LISPRO 6 UNITS: 100 INJECTION, SOLUTION INTRAVENOUS; SUBCUTANEOUS at 21:36

## 2019-08-27 RX ADMIN — HEPARIN SODIUM 5000 UNITS: 5000 INJECTION INTRAVENOUS; SUBCUTANEOUS at 04:25

## 2019-08-27 RX ADMIN — FUROSEMIDE 40 MG: 40 TABLET ORAL at 11:46

## 2019-08-27 RX ADMIN — CARVEDILOL 6.25 MG: 6.25 TABLET, FILM COATED ORAL at 18:40

## 2019-08-27 RX ADMIN — TIOTROPIUM BROMIDE 18 MCG: 18 CAPSULE ORAL; RESPIRATORY (INHALATION) at 09:13

## 2019-08-27 RX ADMIN — DOXYCYCLINE 100 MG: 100 INJECTION, POWDER, LYOPHILIZED, FOR SOLUTION INTRAVENOUS at 04:24

## 2019-08-27 RX ADMIN — INSULIN LISPRO 3 UNITS: 100 INJECTION, SOLUTION INTRAVENOUS; SUBCUTANEOUS at 18:41

## 2019-08-27 RX ADMIN — SACUBITRIL AND VALSARTAN 1 TABLET: 97; 103 TABLET, FILM COATED ORAL at 18:40

## 2019-08-27 RX ADMIN — HEPARIN SODIUM 5000 UNITS: 5000 INJECTION INTRAVENOUS; SUBCUTANEOUS at 18:41

## 2019-08-27 RX ADMIN — ATORVASTATIN CALCIUM 20 MG: 20 TABLET, FILM COATED ORAL at 21:36

## 2019-08-27 RX ADMIN — ASPIRIN 81 MG 162 MG: 81 TABLET ORAL at 09:19

## 2019-08-27 RX ADMIN — METHYLPREDNISOLONE SODIUM SUCCINATE 40 MG: 40 INJECTION, POWDER, FOR SOLUTION INTRAMUSCULAR; INTRAVENOUS at 06:18

## 2019-08-27 RX ADMIN — CARVEDILOL 6.25 MG: 6.25 TABLET, FILM COATED ORAL at 09:19

## 2019-08-27 RX ADMIN — BUDESONIDE AND FORMOTEROL FUMARATE DIHYDRATE 2 PUFF: 160; 4.5 AEROSOL RESPIRATORY (INHALATION) at 20:21

## 2019-08-27 RX ADMIN — TRAMADOL HYDROCHLORIDE 50 MG: 50 TABLET, COATED ORAL at 11:46

## 2019-08-27 RX ADMIN — CEFTRIAXONE 2 G: 2 INJECTION, POWDER, FOR SOLUTION INTRAMUSCULAR; INTRAVENOUS at 04:26

## 2019-08-27 RX ADMIN — FAMOTIDINE 20 MG: 20 TABLET ORAL at 18:40

## 2019-08-27 RX ADMIN — BUDESONIDE AND FORMOTEROL FUMARATE DIHYDRATE 2 PUFF: 160; 4.5 AEROSOL RESPIRATORY (INHALATION) at 09:13

## 2019-08-27 NOTE — PROGRESS NOTES
Reason for Admission:   COPD exacerbation ([facility] [J44.1]               RRAT Score:     34             Resources/supports as identified by patient/family:       Top Challenges facing patient (as identified by patient/family and CM): Finances/Medication cost?     No needs identified  Transportation      Son is    Support system or lack thereof? Son   Living arrangements? Lives with    Self-care/ADLs/Cognition? Semi-independent alert and oriented        Current Advanced Directive/Advance Care Plan:   no                          Plan for utilizing home health:    no, pt not wanting home health                      Likelihood of readmission:   HIGH    Transition of Care Plan:                    Initial assessment completed with patient. Cognitive status of patient: oriented to time, place, person and situation. Face sheet information confirmed:  yes. The patient designates Spouse Jefe to participate in her discharge plan and to receive any needed information. This patient lives in a single family home with spouse. Patient is able to navigate steps as needed. Prior to hospitalization, patient was considered to be independent with ADLs/IADLS : yes . Patient has a current ACP document on file: no  The patient and spouse will be available to transport patient home upon discharge. The patient already has [facility]a Cosier, and BIPAP medical equipment available in the home. Patient is not currently active with home health. Patient has not stayed in a skilled nursing facility or rehab. .      This patient is on dialysis :no     Freedom of choice signed: no. Currently, the discharge plan is Home. The patient states that she can obtain her medications from the pharmacy, and take her medications as directed. Patient's current insurance is Medicare and Medicaid. Care Management Interventions  PCP Verified by CM:  Yes  Last Visit to PCP: 07/17/19  Mode of Transport at Discharge: Self  Current Support Network: Lives with Spouse  Confirm Follow Up Transport: Family  Plan discussed with Pt/Family/Caregiver: Yes  Discharge Location  Discharge Placement: [address] Chandra, RN  Care Manager  [phone]

## 2019-08-27 NOTE — PROGRESS NOTES
[facility] Hospitalist Group  Progress Note    Patient: Tyler Walters Age: 81 y.o. : 1950 MR#: [id number] SSN: [id number]  Date: 2019     Subjective:     SOB stable, dry cough. Feels her breathing is near baseline, no n/v or constipation complaints; no pain complaints including to left ankle. Only c/o feeling \"jittery\"    Assessment/Plan:   1. Acute on chronic combinded systolic and diastolic congestive heart failure - transition to oral lasix per cardiology  2. Acute COPD exacerbation - much improved transition to oral steroids; symbicort spiriva; cont empiric abx   3. CAD s/p stent  - stress test in AM to eval for any ischemia  4. Essential hypertension - controlled, cont BB / cont entresto per cardiology  5. Type 2 diabetes mellitus with hyperglycemia (HCC) - cont on SSI; follow off steroids; A1c 6.6   6. TESSA on CPAP -  Cont at bedside  7. H/o gout flare left ankle - resolved  8. Acute on chronic PIERSON - pt reports some extent for the past approx year; reports now approx baseline    Additional Notes:      Case discussed with:  [x]Patient  []Family  [x]Nursing  []Case Management  DVT Prophylaxis:  []Lovenox  [x]Hep SQ  []SCDs  []Coumadin   []On Heparin gtt    Objective:   VS:   Visit Vitals  /75 (BP 1 Location: Left arm, BP Patient Position: At rest)   Pulse 75   Temp 98.4 °F (36.9 °C)   Resp 19   Ht 5' 4\" (1.626 m)   Wt 93.2 kg (205 lb 8 oz)   SpO2 98%   Breastfeeding?  No   BMI 35.27 kg/m²      Tmax/24hrs: Temp (24hrs), Av.4 °F (36.9 °C), Min:98 °F (36.7 °C), Max:98.8 °F (37.1 °C)      Intake/Output Summary (Last 24 hours) at 2019 1023  Last data filed at 2019 0920  Gross per 24 hour   Intake 1040 ml   Output 1450 ml   Net -410 ml     General:  Alert, NAD  Cardiovascular:  RRR  Pulmonary: + exp wheeze; respiratory effort WNL  GI:  +BS in all four quadrants, soft, non-tender  Extremities:  No edema; 2+ dorsalis pedis pulses bilaterally  Neuro: alert and oriented x 4    Family contact:  Froilan Montague   [phone]     Labs:    Recent Results (from the past 24 hour(s))   GLUCOSE, POC    Collection Time: 08/26/19 11:45 AM   Result Value Ref Range    Glucose (POC) 324 (H) 70 - 110 mg/dL   GLUCOSE, POC    Collection Time: 08/26/19  4:59 PM   Result Value Ref Range    Glucose (POC) 167 (H) 70 - 110 mg/dL   GLUCOSE, POC    Collection Time: 08/26/19  8:12 PM   Result Value Ref Range    Glucose (POC) 211 (H) 70 - 282 mg/dL   METABOLIC PANEL, BASIC    Collection Time: 08/27/19  4:03 AM   Result Value Ref Range    Sodium 140 136 - 145 mmol/L    Potassium 4.4 3.5 - 5.5 mmol/L    Chloride 109 100 - 111 mmol/L    CO2 22 21 - 32 mmol/L    Anion gap 9 3.0 - 18 mmol/L    Glucose 185 (H) 74 - 99 mg/dL    BUN 35 (H) 7.0 - 18 MG/DL    Creatinine 1.68 (H) 0.6 - 1.3 MG/DL    BUN/Creatinine ratio 21 (H) 12 - 20      GFR est AA 37 (L) >60 ml/min/1.73m2    GFR est non-AA 30 (L) >60 ml/min/1.73m2    Calcium 8.6 8.5 - 10.1 MG/DL   MAGNESIUM    Collection Time: 08/27/19  4:03 AM   Result Value Ref Range    Magnesium 1.9 1.6 - 2.6 mg/dL   PHOSPHORUS    Collection Time: 08/27/19  4:03 AM   Result Value Ref Range    Phosphorus 3.7 2.5 - 4.9 MG/DL   CBC WITH AUTOMATED DIFF    Collection Time: 08/27/19  4:03 AM   Result Value Ref Range    WBC 10.6 4.6 - 13.2 K/uL    RBC 3.12 (L) 4.20 - 5.30 M/uL    HGB 8.0 (L) 12.0 - 16.0 g/dL    HCT 26.3 (L) 35.0 - 45.0 %    MCV 84.3 74.0 - 97.0 FL    MCH 25.6 24.0 - 34.0 PG    MCHC 30.4 (L) 31.0 - 37.0 g/dL    RDW 16.8 (H) 11.6 - 14.5 %    PLATELET 695 679 - 776 K/uL    MPV 9.0 (L) 9.2 - 11.8 FL    NEUTROPHILS 89 (H) 40 - 73 %    LYMPHOCYTES 7 (L) 21 - 52 %    MONOCYTES 4 3 - 10 %    EOSINOPHILS 0 0 - 5 %    BASOPHILS 0 0 - 2 %    ABS. NEUTROPHILS 9.5 (H) 1.8 - 8.0 K/UL    ABS. LYMPHOCYTES 0.7 (L) 0.9 - 3.6 K/UL    ABS. MONOCYTES 0.4 0.05 - 1.2 K/UL    ABS. EOSINOPHILS 0.0 0.0 - 0.4 K/UL    ABS.  BASOPHILS 0.0 0.0 - 0.1 K/UL    DF AUTOMATED     GLUCOSE, POC    Collection Time: 08/27/19  7:47 AM   Result Value Ref Range    Glucose (POC) 177 (H) 70 - 110 mg/dL       Signed By: Mallorie Pratt NP     August 27, 2019

## 2019-08-27 NOTE — PROGRESS NOTES
Received report on pt.from off going RN. Resting quietly in bed on rounds. Denies c/o pain or SOB at this time. No acute distress noted. Call bell at side. Will cont to monitor fro any changes in status. 1920 Bedside and Verbal shift change report given to Tess Shepherd (oncoming nurse) by Kami Sierra RN (offgoing nurse). Report given with ROMANA, Carey and MAR.

## 2019-08-27 NOTE — ROUTINE PROCESS
Bedside and Verbal shift change report given to [facility] RN (oncoming nurse) by Harish Wren (offgoing nurse). Report included the following information SBAR, Kardex, MAR, Recent Results and Cardiac Rhythm SR. Patient awake and quietly resting, call light in room.

## 2019-08-27 NOTE — PROGRESS NOTES
Problem: Pain  Goal: *Control of Pain  Outcome: Progressing Towards Goal     Problem: Falls - Risk of  Goal: *Absence of Falls  Description  Document Valdo Kwan Fall Risk and appropriate interventions in the flowsheet.   Outcome: Progressing Towards Goal  Note:   Fall Risk Interventions:  Mobility Interventions: Communicate number of staff needed for ambulation/transfer, Patient to call before getting OOB, Strengthening exercises (ROM-active/passive)

## 2019-08-27 NOTE — PROGRESS NOTES
[facility]      CARDIOLOGY PROGRESS NOTE  RECS:  1. Acute on Chronic Heart failure with mild systolic dysfunction exacerbation-improved very well clinically. 2. CAD s/p YADIRA to proximal LAD with RESOLUTE stent 5/20/2018. Acute stent thrombosis shortly after initial PCI on 5/20/2018 requiring emergency intubation for pulmonary edema and repeat cardiac cath and additional PCI/YADIRA to prox LAD. Nora Taylor was d/c as stents were placed over 1 year ago. Stress test tomorrow to evaluate any ischemia. Will try tramadol for 24 hours for musculoskeletal chest pain. 3. Hx of Ischemic cardiomyopathy- with EF 35% on echo 5/21/2018  with elevated pulmonary pressures at 44mm Hg- now with mild systolic Dysfunction Recent Echo 8/7/29 showed EF% 45%-50%  4. Hypertension- stable  on Entresto and bb   5. DM Type II- medications per medical team   6. Dyslipidemia- on Lipitor 20 mg. LDL 80 on 8/19   7. COPD exacerbation- managed by medical team   8. Hx of Tobacco abuse with   1 ppd X 50 years- patient quit in 2018  9. Obesity- weight loss advised  10.  TESSA- uses CPAP at night      Discussed with Dr. Britta Chang    EKG Results     Procedure 720 Value Units Date/Time    EKG, 12 LEAD, INITIAL [[id number]] Collected:  08/25/19 1243    Order Status:  Completed Updated:  08/25/19 1613     Ventricular Rate 101 BPM      Atrial Rate 101 BPM      P-R Interval 168 ms      QRS Duration 78 ms      Q-T Interval 358 ms      QTC Calculation (Bezet) 464 ms      Calculated P Axis 71 degrees      Calculated R Axis 23 degrees      Calculated T Axis 85 degrees      Diagnosis --     Sinus tachycardia  Otherwise normal ECG  When compared with ECG of 15-AUG-2019 16:26,  No significant change was found  Confirmed by Adam Scott MD, ----- ([id number]) on 8/25/2019 4:13:22 PM          XR Results (most recent):  Results from [place] encounter on 08/25/19   XR CHEST PA LAT    Narrative EXAM: CHEST PA AND LATERAL    CLINICAL HISTORY/INDICATION:  SOB onset evening prior to arrival, associated  with cough, history of COPD     COMPARISON: Chest x-ray August 15, 2019, January 21, 2019. TECHNIQUE: PA and lateral views     FINDINGS:      The cardiac and mediastinal silhouette is mildly enlarged. The pulmonary  vessels are cephalized and ill-defined. Minimal blunting of the costophrenic  angles posteriorly. Mild disc space narrowing with marginal spurring involves  the mid and lower thoracic spine. Impression IMPRESSION:    Mild central vascular congestion with tiny effusions of heart failure       08/07/19   ECHO ADULT COMPLETE 08/07/2019 8/7/2019    Narrative · Left Ventricle: Normal cavity size and wall thickness. Mild systolic   dysfunction. Estimated left ventricular ejection fraction is 46 - 50%. Abnormal left ventricular wall motion. Moderate (grade 2) left ventricular   diastolic dysfunction. · Left Atrium: Moderately dilated left atrium. · Aortic Valve: Aortic valve sclerosis. · Mitral Valve: Mitral valve thickening. Moderate to severe mitral valve   regurgitation. · Tricuspid Valve: Mild tricuspid valve regurgitation is present. Pulmonary arterial systolic pressure is 33.9 mmHg. Moderate pulmonary   hypertension. · Pulmonic Valve: Mild pulmonic valve regurgitation is present. · IVC/Hepatic Veins: Severely elevated central venous pressure (15+ mmHg);   IVC diameter is larger than 21 mm and collapses less than 50% with   respiration.         Signed by: Pauly Bliss MD                 ASSESSMENT:  Hospital Problems  Date Reviewed: 8/13/2019          Codes Class Noted POA    CAP (community acquired pneumonia) ICD-10-CM: J18.9  ICD-9-CM: 209  8/26/2019 Unknown        Combined systolic and diastolic congestive heart failure ([facility] ICD-10-CM: I50.40  ICD-9-CM: 428.40  8/26/2019 Unknown        Type 2 diabetes mellitus with hyperglycemia ([facility] ICD-10-CM: E11.65  ICD-9-CM: 250.00  8/26/2019 Unknown        TESSA on CPAP ICD-10-CM: G47.33, Z99.89  ICD-9-CM: 327.23, V46.8  8/26/2019 Unknown        * (Principal) COPD exacerbation ([facility] ICD-10-CM: J44.1  ICD-9-CM: 491.21  8/25/2019 Yes        Essential hypertension ICD-10-CM: I10  ICD-9-CM: 401.9  9/17/2015 Yes                SUBJECTIVE:  Diffuse anterior CP since Saturday, 4 days ago  Significantly improved shortness of breath    OBJECTIVE:    VS:   Visit Vitals  /82 (BP 1 Location: Left arm, BP Patient Position: At rest)   Pulse 85   Temp 98.8 °F (37.1 °C)   Resp 17   Ht 5' 4\" (1.626 m)   Wt 93.2 kg (205 lb 8 oz)   SpO2 100%   Breastfeeding? No   BMI 35.27 kg/m²         Intake/Output Summary (Last 24 hours) at 8/27/2019 1214  Last data filed at 8/27/2019 0920  Gross per 24 hour   Intake 1040 ml   Output 1450 ml   Net -410 ml     TELE: normal sinus rhythm    General: alert and in no apparent distress  HENT: Normocephalic, atraumatic. Normal external eye. Neck :  no bruit, no JVD, JVD difficult to assess due to obesity  Cardiac:  regular rate and rhythm, S1, S2 normal, no murmur, click, rub or gallop  Chest/Lungs:chest clear, no wheezing, rales, normal symmetric air entry.   Reproducible tenderness in right and left parasternal border anteriorly in the upper half  Abdomen: Soft, nontender, no masses  Extremities:  No c/c/edema, peripheral pulses present      Labs: Results:       Chemistry Recent Labs     08/27/19  0403 08/26/19  0402 08/25/19  1250   * 185* 155*    145 147*   K 4.4 4.1 3.7    111 115*   CO2 22 22 23   BUN 35* 21* 18   CREA 1.68* 1.33* 1.29   CA 8.6 8.7 8.7   MG 1.9  --   --    PHOS 3.7  --   --    AGAP 9 12 9   BUCR 21* 16 14   AP  --   --  115   TP  --   --  6.5   ALB  --   --  3.1*   GLOB  --   --  3.4   AGRAT  --   --  0.9      CBC w/Diff Recent Labs     08/27/19  0403 08/26/19  0402 08/25/19  1250   WBC 10.6 5.4 5.6   RBC 3.12* 3.27* 3.20*   HGB 8.0* 8.3* 8.4*   HCT 26.3* 27.4* 28.7*    390 398   GRANS 89* 90* 64   LYMPH 7* 9* 25 EOS 0 0 3      Cardiac Enzymes No results for input(s): CPK, CKND1, GODFREY in the last 72 hours. No lab exists for component: CKRMB, TROIP   Coagulation No results for input(s): PTP, INR, APTT in the last 72 hours. No lab exists for component: INREXT    Lipid Panel Lab Results   Component Value Date/Time    Cholesterol, total 291 (H) 05/21/2018 06:23 AM    HDL Cholesterol 52 05/21/2018 06:23 AM    LDL, calculated 212.6 (H) 05/21/2018 06:23 AM    VLDL, calculated 26.4 05/21/2018 06:23 AM    Triglyceride 132 05/21/2018 06:23 AM    CHOL/HDL Ratio 5.6 (H) 05/21/2018 06:23 AM      BNP No results for input(s): BNPP in the last 72 hours.    Liver Enzymes Recent Labs     08/25/19  1250   TP 6.5   ALB 3.1*      SGOT <3*      Digoxin    Thyroid Studies Lab Results   Component Value Date/Time    TSH 1.01 05/21/2018 06:23 AM              Kaela Cason MD   Pager # [phone]

## 2019-08-28 ENCOUNTER — HOSPITAL ENCOUNTER (INPATIENT)
Dept: NON INVASIVE DIAGNOSTICS | Age: 69
Discharge: HOME | DRG: 190 | End: 2019-08-28
Attending: INTERNAL MEDICINE
Payer: MEDICARE

## 2019-08-28 VITALS
TEMPERATURE: 98.6 F | RESPIRATION RATE: 16 BRPM | SYSTOLIC BLOOD PRESSURE: 115 MMHG | BODY MASS INDEX: 37.3 KG/M2 | HEART RATE: 78 BPM | OXYGEN SATURATION: 95 % | HEIGHT: 64 IN | WEIGHT: 218.5 LBS | DIASTOLIC BLOOD PRESSURE: 70 MMHG

## 2019-08-28 LAB
ANION GAP SERPL CALC-SCNC: 8 MMOL/L (ref 3–18)
BASOPHILS # BLD: 0 K/UL (ref 0–0.1)
BASOPHILS NFR BLD: 0 % (ref 0–2)
BUN SERPL-MCNC: 41 MG/DL (ref 7–18)
BUN/CREAT SERPL: 22 (ref 12–20)
CALCIUM SERPL-MCNC: 8.3 MG/DL (ref 8.5–10.1)
CHLORIDE SERPL-SCNC: 108 MMOL/L (ref 100–111)
CO2 SERPL-SCNC: 24 MMOL/L (ref 21–32)
CREAT SERPL-MCNC: 1.83 MG/DL (ref 0.6–1.3)
DIFFERENTIAL METHOD BLD: ABNORMAL
EOSINOPHIL # BLD: 0 K/UL (ref 0–0.4)
EOSINOPHIL NFR BLD: 0 % (ref 0–5)
ERYTHROCYTE [DISTWIDTH] IN BLOOD BY AUTOMATED COUNT: 16.6 % (ref 11.6–14.5)
GLUCOSE BLD STRIP.AUTO-MCNC: 130 MG/DL (ref 70–110)
GLUCOSE BLD STRIP.AUTO-MCNC: 143 MG/DL (ref 70–110)
GLUCOSE BLD STRIP.AUTO-MCNC: 192 MG/DL (ref 70–110)
GLUCOSE SERPL-MCNC: 126 MG/DL (ref 74–99)
HCT VFR BLD AUTO: 25.7 % (ref 35–45)
HGB BLD-MCNC: 7.9 G/DL (ref 12–16)
LYMPHOCYTES # BLD: 1.5 K/UL (ref 0.9–3.6)
LYMPHOCYTES NFR BLD: 13 % (ref 21–52)
MCH RBC QN AUTO: 25.9 PG (ref 24–34)
MCHC RBC AUTO-ENTMCNC: 30.7 G/DL (ref 31–37)
MCV RBC AUTO: 84.3 FL (ref 74–97)
MONOCYTES # BLD: 1.1 K/UL (ref 0.05–1.2)
MONOCYTES NFR BLD: 10 % (ref 3–10)
NEUTS SEG # BLD: 8.9 K/UL (ref 1.8–8)
NEUTS SEG NFR BLD: 77 % (ref 40–73)
PLATELET # BLD AUTO: 378 K/UL (ref 135–420)
PMV BLD AUTO: 9.2 FL (ref 9.2–11.8)
POTASSIUM SERPL-SCNC: 3.9 MMOL/L (ref 3.5–5.5)
RBC # BLD AUTO: 3.05 M/UL (ref 4.2–5.3)
SODIUM SERPL-SCNC: 140 MMOL/L (ref 136–145)
STRESS BASELINE DIAS BP: 80 MMHG
STRESS BASELINE HR: 75 BPM
STRESS BASELINE SYS BP: 146 MMHG
STRESS ESTIMATED WORKLOAD: 1 METS
STRESS EXERCISE DUR MIN: NORMAL
STRESS PEAK DIAS BP: 78 MMHG
STRESS PEAK SYS BP: 158 MMHG
STRESS PERCENT HR ACHIEVED: 63 %
STRESS POST PEAK HR: 96 BPM
STRESS RATE PRESSURE PRODUCT: NORMAL BPM*MMHG
STRESS ST DEPRESSION: 0 MM
STRESS ST ELEVATION: 0 MM
STRESS TARGET HR: 153 BPM
WBC # BLD AUTO: 11.5 K/UL (ref 4.6–13.2)

## 2019-08-28 PROCEDURE — 97116 GAIT TRAINING THERAPY: CPT

## 2019-08-28 PROCEDURE — 74011250636 HC RX REV CODE- 250/636: Performed by: INTERNAL MEDICINE

## 2019-08-28 PROCEDURE — 74011000258 HC RX REV CODE- 258: Performed by: INTERNAL MEDICINE

## 2019-08-28 PROCEDURE — 74011000250 HC RX REV CODE- 250: Performed by: HOSPITALIST

## 2019-08-28 PROCEDURE — 93017 CV STRESS TEST TRACING ONLY: CPT

## 2019-08-28 PROCEDURE — 74011000250 HC RX REV CODE- 250: Performed by: INTERNAL MEDICINE

## 2019-08-28 PROCEDURE — 74011250637 HC RX REV CODE- 250/637: Performed by: HOSPITALIST

## 2019-08-28 PROCEDURE — 36415 COLL VENOUS BLD VENIPUNCTURE: CPT

## 2019-08-28 PROCEDURE — 85025 COMPLETE CBC W/AUTO DIFF WBC: CPT

## 2019-08-28 PROCEDURE — 82962 GLUCOSE BLOOD TEST: CPT

## 2019-08-28 PROCEDURE — 94640 AIRWAY INHALATION TREATMENT: CPT

## 2019-08-28 PROCEDURE — 97161 PT EVAL LOW COMPLEX 20 MIN: CPT

## 2019-08-28 PROCEDURE — 74011250637 HC RX REV CODE- 250/637: Performed by: INTERNAL MEDICINE

## 2019-08-28 PROCEDURE — 94660 CPAP INITIATION&MGMT: CPT

## 2019-08-28 PROCEDURE — 74011636637 HC RX REV CODE- 636/637: Performed by: NURSE PRACTITIONER

## 2019-08-28 PROCEDURE — 80048 BASIC METABOLIC PNL TOTAL CA: CPT

## 2019-08-28 RX ORDER — AMOXICILLIN AND CLAVULANATE POTASSIUM 875; 125 MG/1; MG/1
1 TABLET, FILM COATED ORAL 2 TIMES DAILY WITH MEALS
Status: DISCONTINUED | OUTPATIENT
Start: 2019-08-28 | End: 2019-08-28 | Stop reason: HOSPADM

## 2019-08-28 RX ORDER — FAMOTIDINE 20 MG/1
20 TABLET, FILM COATED ORAL DAILY
Status: DISCONTINUED | OUTPATIENT
Start: 2019-08-28 | End: 2019-08-28 | Stop reason: HOSPADM

## 2019-08-28 RX ORDER — FUROSEMIDE 40 MG/1
TABLET ORAL
Qty: 30 TAB | Refills: 0 | Status: SHIPPED | OUTPATIENT
Start: 2019-08-29 | End: 2019-10-11

## 2019-08-28 RX ORDER — AMOXICILLIN AND CLAVULANATE POTASSIUM 875; 125 MG/1; MG/1
1 TABLET, FILM COATED ORAL 2 TIMES DAILY WITH MEALS
Qty: 8 TAB | Refills: 0 | Status: SHIPPED | OUTPATIENT
Start: 2019-08-28 | End: 2019-09-01

## 2019-08-28 RX ORDER — PREDNISONE 10 MG/1
TABLET ORAL
Qty: 21 TAB | Refills: 0 | Status: SHIPPED | OUTPATIENT
Start: 2019-08-29 | End: 2019-09-07

## 2019-08-28 RX ORDER — IPRATROPIUM BROMIDE AND ALBUTEROL SULFATE 2.5; .5 MG/3ML; MG/3ML
3 SOLUTION RESPIRATORY (INHALATION)
Qty: 30 NEBULE | Refills: 0 | Status: SHIPPED | OUTPATIENT
Start: 2019-08-28 | End: 2019-09-09

## 2019-08-28 RX ORDER — SODIUM CHLORIDE 9 MG/ML
50 INJECTION, SOLUTION INTRAVENOUS CONTINUOUS
Status: DISCONTINUED | OUTPATIENT
Start: 2019-08-28 | End: 2019-08-28 | Stop reason: HOSPADM

## 2019-08-28 RX ORDER — OXYCODONE AND ACETAMINOPHEN 5; 325 MG/1; MG/1
1 TABLET ORAL
Qty: 8 TAB | Refills: 0 | Status: SHIPPED | OUTPATIENT
Start: 2019-08-28 | End: 2019-08-30

## 2019-08-28 RX ADMIN — SACUBITRIL AND VALSARTAN 1 TABLET: 97; 103 TABLET, FILM COATED ORAL at 11:19

## 2019-08-28 RX ADMIN — OXYCODONE HYDROCHLORIDE AND ACETAMINOPHEN 1 TABLET: 5; 325 TABLET ORAL at 11:23

## 2019-08-28 RX ADMIN — FAMOTIDINE 20 MG: 20 TABLET, FILM COATED ORAL at 11:18

## 2019-08-28 RX ADMIN — FERROUS SULFATE TAB 325 MG (65 MG ELEMENTAL FE) 325 MG: 325 (65 FE) TAB at 11:19

## 2019-08-28 RX ADMIN — DOXYCYCLINE 100 MG: 100 INJECTION, POWDER, LYOPHILIZED, FOR SOLUTION INTRAVENOUS at 07:10

## 2019-08-28 RX ADMIN — CEFTRIAXONE 2 G: 2 INJECTION, POWDER, FOR SOLUTION INTRAMUSCULAR; INTRAVENOUS at 02:01

## 2019-08-28 RX ADMIN — SODIUM CHLORIDE 50 ML/HR: 900 INJECTION, SOLUTION INTRAVENOUS at 11:24

## 2019-08-28 RX ADMIN — PREDNISONE 40 MG: 20 TABLET ORAL at 11:19

## 2019-08-28 RX ADMIN — IPRATROPIUM BROMIDE AND ALBUTEROL SULFATE 3 ML: .5; 3 SOLUTION RESPIRATORY (INHALATION) at 14:37

## 2019-08-28 RX ADMIN — CARVEDILOL 6.25 MG: 6.25 TABLET, FILM COATED ORAL at 11:19

## 2019-08-28 RX ADMIN — IPRATROPIUM BROMIDE AND ALBUTEROL SULFATE 3 ML: .5; 3 SOLUTION RESPIRATORY (INHALATION) at 01:23

## 2019-08-28 RX ADMIN — REGADENOSON 0.4 MG: 0.08 INJECTION, SOLUTION INTRAVENOUS at 08:50

## 2019-08-28 RX ADMIN — ASPIRIN 81 MG 162 MG: 81 TABLET ORAL at 11:19

## 2019-08-28 RX ADMIN — HEPARIN SODIUM 5000 UNITS: 5000 INJECTION INTRAVENOUS; SUBCUTANEOUS at 02:00

## 2019-08-28 NOTE — DISCHARGE SUMMARY
[facility]  [facility]       Patient: Leticia Bradford Age: 81 y.o. : 1950 MR#: [id number] SSN: [id number]  PCP on record: Noy Cowart MD  Admit date: 2019  Discharge date: 2019    Disposition:    [x]Home   []Home with Home Health   []SNF/NH   []Rehab   []Home with family   []Alternate Facility:____________________    Discharge Diagnoses:                             Acute on chronic combined systolic and diastolic HF  Acute COPD exacerbation  Acute on chronic PIERSON  DMT2 with hyperglycemia secondary to steroid use. Discharge Medications:     Current Discharge Medication List      START taking these medications    Details   amoxicillin-clavulanate (AUGMENTIN) 875-125 mg per tablet Take 1 Tab by mouth two (2) times daily (with meals) for 4 days. Qty: 8 Tab, Refills: 0      predniSONE (DELTASONE) 10 mg tablet Take 40 mg by mouth daily (with breakfast) for 3 days, THEN 20 mg daily (with breakfast) for 3 days, THEN 10 mg daily (with breakfast) for 3 days. Qty: 21 Tab, Refills: 0      albuterol-ipratropium (DUO-NEB) 2.5 mg-0.5 mg/3 ml nebu 3 mL by Nebulization route every six (6) hours as needed for Other (SOB). Qty: 30 Nebule, Refills: 0      oxyCODONE-acetaminophen (PERCOCET) 5-325 mg per tablet Take 1 Tab by mouth every six (6) hours as needed for Pain for up to 2 days. Max Daily Amount: 4 Tabs. Qty: 8 Tab, Refills: 0    Associated Diagnoses: COPD exacerbation ([address]         CONTINUE these medications which have CHANGED    Details   furosemide (LASIX) 40 mg tablet One tab 40 mg daily  Qty: 30 Tab, Refills: 0         CONTINUE these medications which have NOT CHANGED    Details   sacubitril-valsartan (ENTRESTO) 97 mg/103 mg tablet Take 1 Tab by mouth two (2) times a day. Qty: 60 Tab, Refills: 6    Associated Diagnoses: Chronic systolic congestive heart failure (HCC)      aspirin 81 mg chewable tablet Take 2 Tabs by mouth daily.   Qty: 60 Tab, Refills: 0    Associated Diagnoses: Coronary artery disease involving native coronary artery of native heart without angina pectoris      raNITIdine (ZANTAC) 150 mg tablet Take 1 Tab by mouth two (2) times a day. Qty: 60 Tab, Refills: 4      carvedilol (COREG) 6.25 mg tablet Take 1 Tab by mouth two (2) times daily (with meals). Qty: 60 Tab, Refills: 6      atorvastatin (LIPITOR) 20 mg tablet Take 1 Tab by mouth nightly. Qty: 30 Tab, Refills: 0      insulin detemir (LEVEMIR) 100 unit/mL injection 15 units daily for diabetes  Qty: 2 Vial, Refills: 0    Associated Diagnoses: Diabetes mellitus type 2, insulin dependent (HCC)      desloratadine (CLARINEX) 5 mg tablet Take 1 Tab by mouth daily as needed for Allergies. Qty: 30 Tab, Refills: 0    Associated Diagnoses: Environmental allergies      mometasone (NASONEX) 50 mcg/actuation nasal spray 2 Sprays by Both Nostrils route daily as needed. For allergies  Qty: 3 Container, Refills: 3    Associated Diagnoses: Environmental allergies      umeclidinium-vilanterol (ANORO ELLIPTA) 62.5-25 mcg/actuation inhaler Take 1 Puff by inhalation daily. For COPD  Qty: 3 Inhaler, Refills: 3    Comments: 9/22/15 Dr Darshana Bernstein prescribed. Will order via [facility] if available  Associated Diagnoses: Chronic obstructive pulmonary disease, unspecified COPD type (HCC)      ferrous sulfate 325 mg (65 mg iron) tablet Take 1 Tab by mouth two (2) times a day. Indications: IRON DEFICIENCY ANEMIA  Qty: 60 Tab, Refills: 11    Associated Diagnoses: Iron deficiency anemia             Consults:    - cardiology     Significant Diagnostic Studies:   -      XR Results (maximum last 3): Results from [place] encounter on 08/25/19   XR CHEST PA LAT    Impression IMPRESSION:    Mild central vascular congestion with tiny effusions of heart failure   Results from Hospital Encounter encounter on 08/15/19   XR FOOT LT MIN 3 V    Impression IMPRESSION:  1. No evidence of acute fracture or dislocation.     2.  Old postoperative repair in the calcaneus likely for the Achilles tendon. 3.  Moderate first MTP osteoarthritis. XR CHEST PORT    Impression IMPRESSION:    No acute pulmonic disease. Hospital Course by Problem   Per H&P 81 y.o.  female who has PMH of combined CHF on Entresto, CAD s/p multiple stents, COPD and Gout. Pt presented to ER with progressively worsening SOB and dry cough. Had subjective fever and chills few days prior. Pt states that she went to [facility] ER for Gouty arthritis attack in her foot. Pt was given refills to her home meds and steroids in ER and went home. Pt states that she started feeling SOB and felt sick shortly after. On exam, pt is heavily wheezing and mildly congested. Mildly tachycardiac and tachypneic   Pt will be admitted for COPD exacerbation and mild CHF exacerbation with pulmonary congestion      IV Lasix, continue Enstresto, BB. Continue symbicort, spiriva, IV steroids. IV abx/ceftriaxone, doxycycline. CPAP. Cardiology consult with recommendations for diuretics, monitor renal function and electrolytes as EF has improved.  transition to oral Lasix. Transition to oral steroids. Continue symbicort, spiriva. Continue IV abx/ceftriaxone, doxycycline. Cardiology to plan for NST on . Continue entresto and BB.      NST today. No new ischemia noted, no change from last NST. De escalated IV abx to PO augmentin. Continue PO taper dose steroids at discharge. Continue levemir home dose 15 units daily. Today's examination of the patient revealed:     Subjective: All 10 systems reviewed and negative  Objective:   VS:   Visit Vitals  /70 (BP 1 Location: Left arm, BP Patient Position: At rest)   Pulse 78   Temp 98.6 °F (37 °C)   Resp 16   Ht 5' 4\" (1.626 m)   Wt 99.1 kg (218 lb 8 oz)   SpO2 95%   Breastfeeding?  No   BMI 37.51 kg/m²      Tmax/24hrs: Temp (24hrs), Av.4 °F (36.9 °C), Min:97.8 °F (36.6 °C), Max:98.9 °F (37.2 °C) Input/Output:     Intake/Output Summary (Last 24 hours) at 8/28/2019 1548  Last data filed at 8/28/2019 0903  Gross per 24 hour   Intake 240 ml   Output 1300 ml   Net -1060 ml       General:  Alert, NAD  Cardiovascular:  RRR  Pulmonary:  Minimal wheeze t/o  GI:  +BS in all four quadrants, soft, non-tender  Extremities:  No edema; 2+ dorsalis pedis pulses bilaterally  Additional:      Labs:    Recent Results (from the past 24 hour(s))   GLUCOSE, POC    Collection Time: 08/27/19  4:59 PM   Result Value Ref Range    Glucose (POC) 199 (H) 70 - 110 mg/dL   GLUCOSE, POC    Collection Time: 08/27/19  9:36 PM   Result Value Ref Range    Glucose (POC) 224 (H) 70 - 654 mg/dL   METABOLIC PANEL, BASIC    Collection Time: 08/28/19  2:44 AM   Result Value Ref Range    Sodium 140 136 - 145 mmol/L    Potassium 3.9 3.5 - 5.5 mmol/L    Chloride 108 100 - 111 mmol/L    CO2 24 21 - 32 mmol/L    Anion gap 8 3.0 - 18 mmol/L    Glucose 126 (H) 74 - 99 mg/dL    BUN 41 (H) 7.0 - 18 MG/DL    Creatinine 1.83 (H) 0.6 - 1.3 MG/DL    BUN/Creatinine ratio 22 (H) 12 - 20      GFR est AA 33 (L) >60 ml/min/1.73m2    GFR est non-AA 28 (L) >60 ml/min/1.73m2    Calcium 8.3 (L) 8.5 - 10.1 MG/DL   CBC WITH AUTOMATED DIFF    Collection Time: 08/28/19  2:44 AM   Result Value Ref Range    WBC 11.5 4.6 - 13.2 K/uL    RBC 3.05 (L) 4.20 - 5.30 M/uL    HGB 7.9 (L) 12.0 - 16.0 g/dL    HCT 25.7 (L) 35.0 - 45.0 %    MCV 84.3 74.0 - 97.0 FL    MCH 25.9 24.0 - 34.0 PG    MCHC 30.7 (L) 31.0 - 37.0 g/dL    RDW 16.6 (H) 11.6 - 14.5 %    PLATELET 198 175 - 506 K/uL    MPV 9.2 9.2 - 11.8 FL    NEUTROPHILS 77 (H) 40 - 73 %    LYMPHOCYTES 13 (L) 21 - 52 %    MONOCYTES 10 3 - 10 %    EOSINOPHILS 0 0 - 5 %    BASOPHILS 0 0 - 2 %    ABS. NEUTROPHILS 8.9 (H) 1.8 - 8.0 K/UL    ABS. LYMPHOCYTES 1.5 0.9 - 3.6 K/UL    ABS. MONOCYTES 1.1 0.05 - 1.2 K/UL    ABS. EOSINOPHILS 0.0 0.0 - 0.4 K/UL    ABS.  BASOPHILS 0.0 0.0 - 0.1 K/UL    DF AUTOMATED     GLUCOSE, POC    Collection Time: 08/28/19  7:35 AM   Result Value Ref Range    Glucose (POC) 130 (H) 70 - 110 mg/dL   NUCLEAR CARDIAC STRESS TEST    Collection Time: 08/28/19 10:42 AM   Result Value Ref Range    Target  bpm    Exercise duration time 00:04:00     Stress Base Systolic  mmHg    Stress Base Diastolic BP 78 mmHg    Post peak HR 96 BPM    Baseline HR 75 BPM    Estimated workload 1.0 METS    Percent HR 63 %    ST Elevation (mm) 0 mm    ST Depression (mm) 0 mm    Stress Rate Pressure Product 15,168 BPM*mmHg    Baseline  mmHg    Stress Base Diastolic BP 80 mmHg   GLUCOSE, POC    Collection Time: 08/28/19 11:11 AM   Result Value Ref Range    Glucose (POC) 143 (H) 70 - 110 mg/dL   GLUCOSE, POC    Collection Time: 08/28/19  3:15 PM   Result Value Ref Range    Glucose (POC) 192 (H) 70 - 110 mg/dL     Additional Data Reviewed:     Condition: stable  Follow-up Appointments:   1. Your PCP: Ammy Magaña MD, within 7-10days  2. Follow up with cardiology in 2 weeks  3.  Follow up with metabolic panel and report findings to PCP      >30 minutes spent coordinating this discharge (review instructions/follow-up, prescriptions, preparing report for sign off)    Signed:  Margaret Villar NP  8/28/2019  3:48 PM

## 2019-08-28 NOTE — PROGRESS NOTES
Problem: Mobility Impaired (Adult and Pediatric)  Goal: *Acute Goals and Plan of Care (Insert Text)  Outcome: Resolved/Met  PHYSICAL THERAPY EVALUATION AND DISCHARGE    Patient: Gema Arriaza (89 y.o. female)  Date: 8/28/2019  Primary Diagnosis: COPD exacerbation (HCC) [J44.1]        Precautions:        PLOF: Pt lives with  in a 2 story home with no steps to enter. Pt ambulates independently in her home and community and uses a RW when she has a gout exacerbation    ASSESSMENT :  Pt was cleared by nursing to participate in therapy and was agreeable to physical therapy evaluation. Pt[facility] bilateral LE [facility] and pt was independent with bed mobility and transfers. Pt ambulated in room with normal gait pattern with no AD . She had no loss of balance or c/o pain in either LE . Patient does not require further skilled intervention at this level of care. At conclusion of session, pt was left resting comfortably in bed, needs met, nurse notified,and call bell in reach. PLAN :  Recommendations and Planned Interventions:   No formal PT needs identified at this time. Discharge Recommendations: None  Further Equipment Recommendations for Discharge: N/A     SUBJECTIVE:   Patient stated  I'm fine, now that the gout is gone.     OBJECTIVE DATA SUMMARY:     Past Medical History:   Diagnosis Date    Abnormal WBC count 5/17/2016    Bilateral shoulder pain 9/27/2016    Chondromalacia of both patellae     COPD (chronic obstructive pulmonary disease) ([facility] 9/2015    mild-mod dz; Dr Maximo Tirado    Diabetes [facility]) 2013    Diabetic eye exam ([facility] 2016    Dilated cardiomyopathy ([facility]     Dyslipidemia     Gout     Heart attack ([facility]     Heart attack ([facility]     History of echocardiogram 11/14/2014    Mild LVE. EF 40%. Mild, diffuse hypk. Mild LAE. Mild MR.       Hypercholesteremia 1/08/14    Hypertension 2000    Noncompliance with medications 2/16/2016    Obesity     Orthostatic hypotension 5/17/2016 Osteoarthritis of both knees     Pain management 04/01/2016    Dr. Mimi Barrett     Popliteal cyst, bilateral      Vitamin D deficiency 10/16/14     Past Surgical History:   Procedure Laterality Date    HX HEART CATHETERIZATION      HX TUBAL LIGATION       Barriers to Learning/Limitations: None  Compensate with: N/A  Home Situation:   Home Situation  Home Environment: Private residence  # Steps to Enter: 0  One/Two Story Residence: Two story  # of Interior Steps: 12  Height of Each Step (in): 10 inches  [employer]: Left  Living Alone: No  Support Systems: Family member(s)  Patient Expects to be Discharged to[de-identified] Private residence  Current DME Used/Available at Home: [place] Behavior:  Neurologic State: Alert  Orientation Level: Oriented X4  Cognition: Follows commands     Psychosocial  Patient Behaviors: Calm; Cooperative    Strength:    Strength: Within functional limits    Tone & Sensation:   Tone: Normal    Sensation: Intact    Range Of Motion:  AROM: Within functional limits    Functional Mobility:  Bed Mobility:    Supine to Sit: Independent  Sit to Supine: Independent    Transfers:  Sit to Stand: Independent  Stand to Sit: Independent    Balance:   Sitting: Intact  Standing: Intact    Ambulation/Gait Training:  Gait Description (WDL): Within defined limits , Pt amb x 50 feet in room independently, no LOB    Pain:  Pain level pre-treatment: 0/10   Pain level post-treatment: 0/10  Pain Intervention(s): N/A    Activity Tolerance:   Good  Please refer to the flowsheet for vital signs taken during this treatment. After treatment:   ?         Patient left in no apparent distress sitting up in chair  ? Patient left in no apparent distress in bed  ? Call bell left within reach  ? Nursing notified  ? Caregiver present  ? Bed alarm activated  ? SCDs applied    COMMUNICATION/EDUCATION:   ?         Role of Physical Therapy in the acute care setting.   ?         Fall prevention education was provided and the patient/caregiver indicated understanding. ? Patient/family have participated as able in goal setting and plan of care. ?         Patient/family agree to work toward stated goals and plan of care. ?         Patient understands intent and goals of therapy, but is neutral about his/her participation. ? Patient is unable to participate in goal setting/plan of care: ongoing with therapy staff. ?         Other:     Thank you for this referral.  Joi Hidalgo, PT   Time Calculation: 23 mins      Eval Complexity: History: MEDIUM  Complexity : 1-2 comorbidities / personal factors will impact the outcome/ POC Exam:MEDIUM Complexity : 3 Standardized tests and measures addressing body structure, function, activity limitation and / or participation in recreation  Presentation: MEDIUM Complexity : Evolving with changing characteristics  Clinical Decision Making:Medium Complexity    Overall Complexity:MEDIUM

## 2019-08-28 NOTE — PROGRESS NOTES
[facility]      CARDIOLOGY PROGRESS NOTE  RECS:      1. Acute on Chronic Heart failure with mild systolic dysfunction exacerbation-improved very well clinically. 2. CAD s/p YADIRA to proximal LAD with RESOLUTE stent 5/20/2018. Acute stent thrombosis shortly after initial PCI on 5/20/2018 requiring emergency intubation for pulmonary edema and repeat cardiac cath and additional PCI/YADIRA to prox LAD. Shelby Valdez was d/c as stents were placed over 1 year ago. Stress test today  to evaluate any ischemia. 3. Hx of Ischemic cardiomyopathy- with EF 35% on echo 5/21/2018  with elevated pulmonary pressures at 44mm Hg- now with mild systolic Dysfunction Recent Echo 8/7/19 showed EF% 45%-50%  4. Hypertension- stable  on Entresto and bb   5. DM Type II- medications per medical team   6. Dyslipidemia- on Lipitor 20 mg. LDL 80 on 8/19   7. COPD exacerbation- managed by medical team   8. Hx of Tobacco abuse with   1 ppd X 50 years- patient quit in 2018  9. Obesity- weight loss advised  10. TESSA- uses CPAP at night      Further recommendations depending on the stress test findings. Gentle IV fluids as creatinine has increased significantly.     EKG Results     Procedure 720 Value Units Date/Time    EKG, 12 LEAD, INITIAL [[id number]] Collected:  08/25/19 1243    Order Status:  Completed Updated:  08/25/19 1613     Ventricular Rate 101 BPM      Atrial Rate 101 BPM      P-R Interval 168 ms      QRS Duration 78 ms      Q-T Interval 358 ms      QTC Calculation (Bezet) 464 ms      Calculated P Axis 71 degrees      Calculated R Axis 23 degrees      Calculated T Axis 85 degrees      Diagnosis --     Sinus tachycardia  Otherwise normal ECG  When compared with ECG of 15-AUG-2019 16:26,  No significant change was found  Confirmed by Kym Reed MD, ----- ([id number]) on 8/25/2019 4:13:22 PM          XR Results (most recent):  [facility]   XR CHEST PA LAT    Narrative EXAM: CHEST PA AND LATERAL    CLINICAL HISTORY/INDICATION:  SOB onset evening prior to arrival, associated  with cough, history of COPD     COMPARISON: Chest x-ray August 15, 2019, January 21, 2019. TECHNIQUE: PA and lateral views     FINDINGS:      The cardiac and mediastinal silhouette is mildly enlarged. The pulmonary  vessels are cephalized and ill-defined. Minimal blunting of the costophrenic  angles posteriorly. Mild disc space narrowing with marginal spurring involves  the mid and lower thoracic spine. Impression IMPRESSION:    Mild central vascular congestion with tiny effusions of heart failure       08/07/19   ECHO ADULT COMPLETE 08/07/2019 8/7/2019    Narrative · Left Ventricle: Normal cavity size and wall thickness. Mild systolic   dysfunction. Estimated left ventricular ejection fraction is 46 - 50%. Abnormal left ventricular wall motion. Moderate (grade 2) left ventricular   diastolic dysfunction. · Left Atrium: Moderately dilated left atrium. · Aortic Valve: Aortic valve sclerosis. · Mitral Valve: Mitral valve thickening. Moderate to severe mitral valve   regurgitation. · Tricuspid Valve: Mild tricuspid valve regurgitation is present. Pulmonary arterial systolic pressure is 85.6 mmHg. Moderate pulmonary   hypertension. · Pulmonic Valve: Mild pulmonic valve regurgitation is present. · IVC/Hepatic Veins: Severely elevated central venous pressure (15+ mmHg);   IVC diameter is larger than 21 mm and collapses less than 50% with   respiration.         Signed by: Simeon Greene MD                 ASSESSMENT:  Hospital Problems  Date Reviewed: 8/13/2019          Codes Class Noted POA    CAP (community acquired pneumonia) ICD-10-CM: J18.9  ICD-9-CM: 160  8/26/2019 Unknown        Combined systolic and diastolic congestive heart failure ([facility] [address] ICD-10-CM: I50.40  ICD-9-CM: 428.40  8/26/2019 Unknown        Type 2 diabetes mellitus with hyperglycemia (HCC) ICD-10-CM: E11.65  ICD-9-CM: 250.00 8/26/2019 Unknown        TESSA on CPAP ICD-10-CM: G47.33, Z99.89  ICD-9-CM: 327.23, V46.8  8/26/2019 Unknown        * ([facility]) [facility] ICD-10-CM: J44.1  ICD-9-CM: 491.21  8/25/2019 Yes        Essential hypertension ICD-10-CM: I10  ICD-9-CM: 401.9  9/17/2015 Yes                SUBJECTIVE:  Improving chest pain   Significantly improved shortness of breath    OBJECTIVE:    VS:   Visit Vitals  /68 (BP 1 Location: Left arm, BP Patient Position: At rest)   Pulse 78   Temp 98.4 °F (36.9 °C)   Resp 16   Ht 5' 4\" (1.626 m)   Wt 99.1 kg (218 lb 8 oz)   SpO2 98%   Breastfeeding? No   BMI 37.51 kg/m²         Intake/Output Summary (Last 24 hours) at 8/28/2019 0917  Last data filed at 8/28/2019 8437  Gross per 24 hour   Intake 720 ml   Output 1300 ml   Net -580 ml     TELE: normal sinus rhythm    General: alert and in no apparent distress  HENT: Normocephalic, atraumatic. Normal external eye. Neck :  no bruit, no JVD, JVD difficult to assess due to obesity  Cardiac:  regular rate and rhythm, S1, S2 normal, no murmur, click, rub or gallop  Chest/Lungs:chest clear, no wheezing, rales, normal symmetric air entry.   Reproducible tenderness in right and left parasternal border anteriorly in the upper half  Abdomen: Soft, nontender, no masses  Extremities:  No c/c/edema, peripheral pulses present      Labs: Results:       Chemistry Recent Labs     08/28/19  0244 08/27/19  0403 08/26/19  0402 08/25/19  1250   * 185* 185* 155*    140 145 147*   K 3.9 4.4 4.1 3.7    109 111 115*   CO2 24 22 22 23   BUN 41* 35* 21* 18   CREA 1.83* 1.68* 1.33* 1.29   CA 8.3* 8.6 8.7 8.7   MG  --  1.9  --   --    PHOS  --  3.7  --   --    AGAP 8 9 12 9   BUCR 22* 21* 16 14   AP  --   --   --  115   TP  --   --   --  6.5   ALB  --   --   --  3.1*   GLOB  --   --   --  3.4   AGRAT  --   --   --  0.9      CBC w/Diff Recent Labs     08/28/19  0244 08/27/19  0403 08/26/19  0402   WBC 11.5 10.6 5.4   RBC 3.05* 3.12* 3.27* HGB 7.9* 8.0* 8.3*   HCT 25.7* 26.3* 27.4*    381 390   GRANS 77* 89* 90*   LYMPH 13* 7* 9*   EOS 0 0 0      Cardiac Enzymes No results for input(s): CPK, CKND1, GODFREY in the last 72 hours. No lab exists for component: CKRMB, TROIP   Coagulation No results for input(s): PTP, INR, APTT in the last 72 hours. No lab exists for component: INREXT, INREXT    Lipid Panel Lab Results   Component Value Date/Time    Cholesterol, total 291 (H) 05/21/2018 06:23 AM    HDL Cholesterol 52 05/21/2018 06:23 AM    LDL, calculated 212.6 (H) 05/21/2018 06:23 AM    VLDL, calculated 26.4 05/21/2018 06:23 AM    Triglyceride 132 05/21/2018 06:23 AM    CHOL/HDL Ratio 5.6 (H) 05/21/2018 06:23 AM      BNP No results for input(s): BNPP in the last 72 hours. Liver Enzymes Recent Labs     08/25/19  1250   TP 6.5   ALB 3.1*      SGOT <3*      Digoxin    Thyroid Studies Lab Results   Component Value Date/Time    TSH 1.01 05/21/2018 06:23 AM              ANIL Butler    I have independently evaluated and examined the patient. All relevant labs and testing data's are reviewed. Care plan discussed and updated after review.   Dieudonne Zaman MD

## 2019-08-28 NOTE — PROGRESS NOTES
[facility] Hospitalist Group  Progress Note    Patient: Cass Olivera Age: 81 y.o. : 1950 MR#: [id number] SSN: [id number]  Date: 2019     Subjective:   Dry cough. SOB same. Chest discomfort with cough. Taking tramadol as needed. Her jittery feeling has improved. Assessment/Plan:   1. Acute on chronic combined systolic and diastolic HF  2. Acute COPD exacerbation  3. CAD s/p stent 2018  4. Essential HTN  5. DMT2 with hyperglycemia  6. TESSA  7. Hx of gout  8. Acute on chronic PIERSON. Plan  1. NST today. Cardiology consult with recommendations continue ASA, statin, coreg, PO lasix, entresto  2. Continue doxycycline, symbicort, duoneb, spiriva, PO prednisone  3. POC glucose, SSI    Additional Notes:      Case discussed with:  [x]Patient  [x]Family  []Nursing  []Case Management  DVT Prophylaxis:  []Lovenox  [x]Hep SQ  []SCDs  []Coumadin   []On Heparin gtt    Objective:   VS:   Visit Vitals  /44 (BP 1 Location: Left arm, BP Patient Position: At rest)   Pulse 86   Temp 97.8 °F (36.6 °C)   Resp 16   Ht 5' 4\" (1.626 m)   Wt 99.1 kg (218 lb 8 oz)   SpO2 99%   Breastfeeding?  No   BMI 37.51 kg/m²      Tmax/24hrs: Temp (24hrs), Av.4 °F (36.9 °C), Min:97.8 °F (36.6 °C), Max:98.9 °F (37.2 °C)      Intake/Output Summary (Last 24 hours) at 2019 1156  Last data filed at 2019 7473  Gross per 24 hour   Intake 480 ml   Output 1300 ml   Net -820 ml       General:  Alert, NAD  Cardiovascular:  RRR  Pulmonary:  Scattered wheeze t/o  GI:  +BS in all four quadrants, soft, non-tender  Extremities:  No edema; 2+ dorsalis pedis pulses bilaterally  Neuro: alert and oriented    Family contact: family at bedside    Labs:    Recent Results (from the past 24 hour(s))   GLUCOSE, POC    Collection Time: 19  4:59 PM   Result Value Ref Range    Glucose (POC) 199 (H) 70 - 110 mg/dL   GLUCOSE, POC    Collection Time: 19  9:36 PM   Result Value Ref Range    Glucose (POC) 224 (H) 70 - 408 mg/dL   METABOLIC PANEL, BASIC    Collection Time: 08/28/19  2:44 AM   Result Value Ref Range    Sodium 140 136 - 145 mmol/L    Potassium 3.9 3.5 - 5.5 mmol/L    Chloride 108 100 - 111 mmol/L    CO2 24 21 - 32 mmol/L    Anion gap 8 3.0 - 18 mmol/L    Glucose 126 (H) 74 - 99 mg/dL    BUN 41 (H) 7.0 - 18 MG/DL    Creatinine 1.83 (H) 0.6 - 1.3 MG/DL    BUN/Creatinine ratio 22 (H) 12 - 20      GFR est AA 33 (L) >60 ml/min/1.73m2    GFR est non-AA 28 (L) >60 ml/min/1.73m2    Calcium 8.3 (L) 8.5 - 10.1 MG/DL   CBC WITH AUTOMATED DIFF    Collection Time: 08/28/19  2:44 AM   Result Value Ref Range    WBC 11.5 4.6 - 13.2 K/uL    RBC 3.05 (L) 4.20 - 5.30 M/uL    HGB 7.9 (L) 12.0 - 16.0 g/dL    HCT 25.7 (L) 35.0 - 45.0 %    MCV 84.3 74.0 - 97.0 FL    MCH 25.9 24.0 - 34.0 PG    MCHC 30.7 (L) 31.0 - 37.0 g/dL    RDW 16.6 (H) 11.6 - 14.5 %    PLATELET 342 138 - 877 K/uL    MPV 9.2 9.2 - 11.8 FL    NEUTROPHILS 77 (H) 40 - 73 %    LYMPHOCYTES 13 (L) 21 - 52 %    MONOCYTES 10 3 - 10 %    EOSINOPHILS 0 0 - 5 %    BASOPHILS 0 0 - 2 %    ABS. NEUTROPHILS 8.9 (H) 1.8 - 8.0 K/UL    ABS. LYMPHOCYTES 1.5 0.9 - 3.6 K/UL    ABS. MONOCYTES 1.1 0.05 - 1.2 K/UL    ABS. EOSINOPHILS 0.0 0.0 - 0.4 K/UL    ABS.  BASOPHILS 0.0 0.0 - 0.1 K/UL    DF AUTOMATED     GLUCOSE, POC    Collection Time: 08/28/19  7:35 AM   Result Value Ref Range    Glucose (POC) 130 (H) 70 - 110 mg/dL   NUCLEAR CARDIAC STRESS TEST    Collection Time: 08/28/19 10:42 AM   Result Value Ref Range    Target  bpm    Exercise duration time 00:04:00     Stress Base Systolic  mmHg    Stress Base Diastolic BP 78 mmHg    Post peak HR 96 BPM    Baseline HR 75 BPM    Estimated workload 1.0 METS    Percent HR 63 %    ST Elevation (mm) 0 mm    ST Depression (mm) 0 mm    Stress Rate Pressure Product 15,168 BPM*mmHg    Baseline  mmHg    Stress Base Diastolic BP 80 mmHg   GLUCOSE, POC    Collection Time: 08/28/19 11:11 AM   Result Value Ref Range    Glucose (POC) 143 (H) 70 - 110 mg/dL       Signed By: Julián Anna NP     August 28, 2019

## 2019-08-28 NOTE — PROGRESS NOTES
Problem: Pain  Goal: *Control of Pain  Outcome: Progressing Towards Goal     Problem: Falls - Risk of  Goal: *Absence of Falls  Description  Document Darshana Page Fall Risk and appropriate interventions in the flowsheet.   Outcome: Progressing Towards Goal  Note:   Fall Risk Interventions:  Mobility Interventions: Patient to call before getting OOB         Medication Interventions: Patient to call before getting OOB, Teach patient to arise slowly

## 2019-08-28 NOTE — DISCHARGE INSTRUCTIONS
DISCHARGE SUMMARY from Nurse    PATIENT INSTRUCTIONS:    After general anesthesia or intravenous sedation, for 24 hours or while taking prescription Narcotics:  · Limit your activities  · Do not drive and operate hazardous machinery  · Do not make important personal or business decisions  · Do  not drink alcoholic beverages  · If you have not urinated within 8 hours after discharge, please contact your surgeon on call. Report the following to your surgeon:  · Excessive pain, swelling, redness or odor of or around the surgical area  · Temperature over 100.5  · Nausea and vomiting lasting longer than 4 hours or if unable to take medications  · Any signs of decreased circulation or nerve impairment to extremity: change in color, persistent  numbness, tingling, coldness or increase pain  · Any questions    What to do at Home:  Recommended activity: Activity as tolerated,     If you experience any of the following symptoms chest pain, shortness of breath, swelling in feet and legs, dizziness, increased weakness, please follow up with MD or call 911 if an emergency. *  Please give a list of your current medications to your Primary Care Provider. *  Please update this list whenever your medications are discontinued, doses are      changed, or new medications (including over-the-counter products) are added. *  Please carry medication information at all times in case of emergency situations. These are general instructions for a healthy lifestyle:    No smoking/ No tobacco products/ Avoid exposure to second hand smoke  Surgeon General's Warning:  Quitting smoking now greatly reduces serious risk to your health.     Obesity, smoking, and sedentary lifestyle greatly increases your risk for illness    A healthy diet, regular physical exercise & weight monitoring are important for maintaining a healthy lifestyle    You may be retaining fluid if you have a history of heart failure or if you experience any of the following symptoms:  Weight gain of 3 pounds or more overnight or 5 pounds in a week, increased swelling in our hands or feet or shortness of breath while lying flat in bed. Please call your doctor as soon as you notice any of these symptoms; do not wait until your next office visit. The discharge information has been reviewed with the patient. The patient verbalized understanding. Discharge medications reviewed with the patient and appropriate educational materials and side effects teaching were provided.   ___________________________________________________________________________________________________________  Patient {ARMBANDS:[id number]}    ________________________

## 2019-08-28 NOTE — PROGRESS NOTES
Patient was given 11. 0mCi of 99mTc Sestamibi for the resting images. Patient was also given 33. 0mCi 99mTc Sestamibi for the stress images  Injected 0.4mg Lexiscan.       No arm clearance for CT portion of test

## 2019-08-28 NOTE — PROGRESS NOTES
D/C orders received. No needs identified by . Chart reviewed. Pt will be transported home by [facility].  available as needed.     Michelle Lozano, RN  Care Manager  [phone]

## 2019-08-28 NOTE — ROUTINE PROCESS
Bedside and Verbal shift change report given to [facility] RN (oncoming nurse) by Wade Resendez (offgoing nurse). Report included the following information SBAR, Kardex, MAR, Recent Results and Cardiac Rhythm SR. Patient awake and quietly resting, call light in room.

## 2019-08-28 NOTE — PROGRESS NOTES
Received report on pt.from off going RN. Resting quietly in bed on rounds. Denies c/o pain or SOB at this time. Call bell at side. No acute distress noted. Will cont to monitor for any changes in status. 1940 Bedside and Verbal shift change report given to Katelin (oncoming nurse) by Roula Wong RN (offgoing nurse).   Report given with Carey AVENDANO and MAR.   ,

## 2019-08-28 NOTE — PROGRESS NOTES
conducted a Follow up consultation and Spiritual Assessment for Corinne Tinsley, who is a 81 y.o.,female. The  provided the following Interventions:  Patient and her  were patiently waiting for the discharge orders at the time of visit. I continued the relationship of care and support in following up discharge status. Chart reviewed indicates that discharge per MD was ordered. Stress test result for follow up. RN was notified. Offered assurance of continued prayers on patient's behalf. The following outcomes were achieved:  Patient expressed gratitude for 's visit and follow-up intervention. Assessment:  There are no further spiritual or Restorationist issues which require Spiritual Care Services interventions at this time. Plan:  Chaplains will continue to follow and will provide pastoral care on an as needed/requested basis.  recommends bedside caregivers page  on duty if patient shows signs of acute spiritual or emotional distress.        [address] [facility]   [phone]

## 2019-08-29 NOTE — PROGRESS NOTES
OT order received and chart reviewed. Patient discharged before evaluation completed.   Thank you for the referral.  Berry Andrew MS OTR/L

## 2019-08-29 NOTE — PROGRESS NOTES
Received report on pt.from off going RN. Resting quietly in bed on rounds. Denies c/o pain or SOB at this time. No acute distress noted. NPO for stress test. Call bell at side. Will cont to monitor for any changes in status. 1815 pt given discharge inst and prescriptions. Expressed understanding. Discharged home. To exit per w/c. No distress noted at time of transport.

## 2019-09-09 ENCOUNTER — OFFICE VISIT (OUTPATIENT)
Dept: CARDIOLOGY CLINIC | Age: 69
End: 2019-09-09

## 2019-09-09 VITALS
SYSTOLIC BLOOD PRESSURE: 168 MMHG | HEIGHT: 64 IN | HEART RATE: 109 BPM | BODY MASS INDEX: 37.18 KG/M2 | DIASTOLIC BLOOD PRESSURE: 75 MMHG | WEIGHT: 217.8 LBS

## 2019-09-09 DIAGNOSIS — E11.21 TYPE 2 DIABETES WITH NEPHROPATHY (HCC): ICD-10-CM

## 2019-09-09 DIAGNOSIS — E78.5 DYSLIPIDEMIA, GOAL LDL BELOW 70: ICD-10-CM

## 2019-09-09 DIAGNOSIS — I25.10 CORONARY ARTERY DISEASE INVOLVING NATIVE CORONARY ARTERY OF NATIVE HEART WITHOUT ANGINA PECTORIS: ICD-10-CM

## 2019-09-09 DIAGNOSIS — D64.9 ANEMIA, UNSPECIFIED TYPE: ICD-10-CM

## 2019-09-09 DIAGNOSIS — I50.22 CHRONIC SYSTOLIC CONGESTIVE HEART FAILURE (HCC): Primary | ICD-10-CM

## 2019-09-09 DIAGNOSIS — I25.5 ISCHEMIC CARDIOMYOPATHY: ICD-10-CM

## 2019-09-09 DIAGNOSIS — I10 ESSENTIAL HYPERTENSION: ICD-10-CM

## 2019-09-09 RX ORDER — CARVEDILOL 12.5 MG/1
12.5 TABLET ORAL 2 TIMES DAILY WITH MEALS
Qty: 6180 TAB | Refills: 1 | Status: SHIPPED | OUTPATIENT
Start: 2019-09-09 | End: 2019-10-11

## 2019-09-09 NOTE — PROGRESS NOTES
HISTORY OF PRESENT ILLNESS  Jaylan Marsh is a 81 y.o. female. History of anterior wall MI complicated by cardiac arrest due to acute stent closure requiring repeat PCI to her LAD with 2 YADIRA 5/2018        Cardiomyopathy   The history is provided by the patient and medical records. This is a chronic problem. The current episode started more than 1 week ago. The problem occurs constantly. The problem has not changed since onset. Associated symptoms include shortness of breath. Pertinent negatives include no chest pain and no headaches. The symptoms are aggravated by exertion. The symptoms are relieved by rest.   Hypertension   The history is provided by the patient and medical records. This is a chronic problem. The current episode started more than 1 week ago. The problem occurs constantly. Associated symptoms include shortness of breath. Pertinent negatives include no chest pain and no headaches. Shortness of Breath   The history is provided by the patient. This is a chronic problem. The problem occurs intermittently. The current episode started more than 1 week ago. Pertinent negatives include no fever, no headaches, no cough, no wheezing, no PND, no orthopnea, no chest pain, no vomiting, no rash, no leg swelling and no claudication. The problem's precipitants include exercise (1 flight). Review of Systems   Constitutional: Negative for chills, fever, malaise/fatigue and weight loss. HENT: Negative for nosebleeds. Eyes: Negative for discharge. Respiratory: Positive for shortness of breath. Negative for cough and wheezing. Cardiovascular: Negative for chest pain, palpitations, orthopnea, claudication, leg swelling and PND. Gastrointestinal: Negative for diarrhea, nausea and vomiting. Genitourinary: Negative for dysuria and hematuria. Musculoskeletal: Negative for falls and joint pain. Skin: Negative for rash.    Neurological: Negative for dizziness, seizures, loss of consciousness and headaches. Endo/Heme/Allergies: Negative for polydipsia. Does not bruise/bleed easily. Psychiatric/Behavioral: Negative for depression and substance abuse. The patient does not have insomnia. No Known Allergies    Past Medical History:   Diagnosis Date    Abnormal WBC count 2016    Bilateral shoulder pain 2016    Chondromalacia of both patellae     COPD (chronic obstructive pulmonary disease) ([facility] [address] 2015    mild-mod dz; Dr Aron Fabry    Diabetes [facility])     [facility]     [facility]     Dyslipidemia     Gout     Heart attack ([facility] [address]     [facility]     History of echocardiogram 2014    Mild LVE. EF 40%. Mild, diffuse hypk. Mild LAE. Mild MR.      Hypercholesteremia 14    Hypertension     Noncompliance with medications 2016    Obesity     Orthostatic hypotension 2016    Osteoarthritis of both knees     Pain management 2016    Dr. Rosenberg Labor Popliteal cyst, bilateral      Vitamin D deficiency 10/16/14       Family History   Problem Relation Age of Onset    Diabetes Mother     Hypertension Mother     Hypertension Father     Kidney Disease Maternal Aunt 48        Dialysis       Social History     Tobacco Use    Smoking status: Former Smoker     Packs/day: 0.25     Years: 48.00     Pack years: 12.00     Types: Cigarettes     Last attempt to quit: 2018     Years since quittin.3    Smokeless tobacco: Never Used   Substance Use Topics    Alcohol use: No     Alcohol/week: 0.0 standard drinks    Drug use: No        Current Outpatient Medications   Medication Sig    furosemide (LASIX) 40 mg tablet One tab 40 mg daily    sacubitril-valsartan (ENTRESTO) 97 mg/103 mg tablet Take 1 Tab by mouth two (2) times a day.  aspirin 81 mg chewable tablet Take 2 Tabs by mouth daily.  raNITIdine (ZANTAC) 150 mg tablet Take 1 Tab by mouth two (2) times a day.     carvedilol (COREG) 6.25 mg tablet Take 1 Tab by mouth two (2) times daily (with meals).  atorvastatin (LIPITOR) 20 mg tablet Take 1 Tab by mouth nightly.  insulin detemir (LEVEMIR) 100 unit/mL injection 15 units daily for diabetes    desloratadine (CLARINEX) 5 mg tablet Take 1 Tab by mouth daily as needed for Allergies.  mometasone (NASONEX) 50 mcg/actuation nasal spray 2 Sprays by Both Nostrils route daily as needed. For allergies    umeclidinium-vilanterol (ANORO ELLIPTA) 62.5-25 mcg/actuation inhaler Take 1 Puff by inhalation daily. For COPD    ferrous sulfate 325 mg (65 mg iron) tablet Take 1 Tab by mouth two (2) times a day. Indications: IRON DEFICIENCY ANEMIA     No current facility-administered medications for this visit. Past Surgical History:   Procedure Laterality Date    HX HEART CATHETERIZATION      HX TUBAL LIGATION         Visit Vitals  /75   Pulse (!) 109   Ht 5' 4\" (1.626 m)   Wt 98.8 kg (217 lb 12.8 oz)   BMI 37.39 kg/m²       Diagnostic Studies:  I have reviewed the relevant tests done on the patient and show as follows  EKG tracings reviewed by me today. EKG Results     None        XR Results (most recent):  Results from Hospital Encounter encounter on 08/25/19   XR CHEST PA LAT    Narrative EXAM: CHEST PA AND LATERAL    CLINICAL HISTORY/INDICATION:  SOB onset evening prior to arrival, associated  with cough, history of COPD     COMPARISON: Chest x-ray August 15, 2019, January 21, 2019. TECHNIQUE: PA and lateral views     FINDINGS:      The cardiac and mediastinal silhouette is mildly enlarged. The pulmonary  vessels are cephalized and ill-defined. Minimal blunting of the costophrenic  angles posteriorly. Mild disc space narrowing with marginal spurring involves  the mid and lower thoracic spine.       Impression IMPRESSION:    Mild central vascular congestion with tiny effusions of heart failure       08/07/19   ECHO ADULT COMPLETE 08/07/2019 8/7/2019    Narrative · Left Ventricle: Normal cavity size and wall thickness. Mild systolic   dysfunction. Estimated left ventricular ejection fraction is 46 - 50%. Abnormal left ventricular wall motion. Moderate (grade 2) left ventricular   diastolic dysfunction. · Left Atrium: Moderately dilated left atrium. · Aortic Valve: Aortic valve sclerosis. · Mitral Valve: Mitral valve thickening. Moderate to severe mitral valve   regurgitation. · Tricuspid Valve: Mild tricuspid valve regurgitation is present. Pulmonary arterial systolic pressure is 22.2 mmHg. Moderate pulmonary   hypertension. · Pulmonic Valve: Mild pulmonic valve regurgitation is present. · IVC/Hepatic Veins: Severely elevated central venous pressure (15+ mmHg);   IVC diameter is larger than 21 mm and collapses less than 50% with   respiration. Signed by: Pricila Roberto MD       08/25/19   NUCLEAR CARDIAC STRESS TEST 08/28/2019 8/28/2019    Narrative · Baseline ECG: Normal sinus rhythm, occasional PACs. · Gated SPECT: Left ventricular function post-stress was abnormal.   Calculated ejection fraction is 44%. The TID ratio is 0.94. · Negative stress test.  · Left ventricular perfusion is abnormal.  · Myocardial perfusion imaging defect 1: There is a defect that is   moderate in size with a severe reduction in uptake present in the   mid-apical anterior and apex location(s) that is non-reversible. There is   abnormal wall motion in the defect area. Viability in the area is poor. The defect appears to be infarction. Perfusion defect was visually present   without quantitative evidence. · Positive myocardial perfusion imaging. Myocardial perfusion imaging   supports an intermediate risk stress test.        Signed by: Lupe Ortiz MD            No flowsheet data found. Ms. Khari Carranza has a reminder for a \"due or due soon\" health maintenance. I have asked that she contact her primary care provider for follow-up on this health maintenance.       Physical Exam   Constitutional: She is oriented to person, place, and time. She appears well-developed and well-nourished. Neck: No JVD present. Cardiovascular: Normal rate, regular rhythm, normal heart sounds and intact distal pulses. Exam reveals no gallop and no friction rub. No murmur heard. Pulmonary/Chest: Effort normal and breath sounds normal. No respiratory distress. She has no wheezes. She has no rales. She exhibits no tenderness. Abdominal: Soft. She exhibits no distension and no mass. There is no tenderness. Musculoskeletal: Normal range of motion. She exhibits no edema. Neurological: She is alert and oriented to person, place, and time. Skin: Skin is warm and dry. Psychiatric: She has a normal mood and affect. ASSESSMENT and PLAN        9/19 lipid panel has not been done. Will reorder. Increase Coreg to better control blood pressure and heart rate. Stat H&H to follow-up on severe anemia. May need blood transfusion. Echo and stress test relatively unremarkable with old anterior MI.  EF is 45% better than May 2018 when it was 35%      Diagnoses and all orders for this visit:    1. Chronic systolic congestive heart failure (HCC)  -     carvedilol (COREG) 12.5 mg tablet; Take 1 Tab by mouth two (2) times daily (with meals). 2. Coronary artery disease involving native coronary artery of native heart without angina pectoris    3. Ischemic cardiomyopathy    4. Essential hypertension  -     carvedilol (COREG) 12.5 mg tablet; Take 1 Tab by mouth two (2) times daily (with meals). 5. Dyslipidemia, goal LDL below 70  -     LIPID PANEL; Future    6. Type 2 diabetes with nephropathy ([facility] [facility]    7. Anemia, unspecified type  -     HGB & HCT; Future  -     IRON PROFILE; Future        Pertinent laboratory and test data reviewed and discussed with patient.   See patient instructions also for other medical advice given    Medications Discontinued During This Encounter   Medication Reason    albuterol-ipratropium (DUO-NEB) 2.5 mg-0.5 mg/3 ml nebu     carvedilol (COREG) 6.25 mg tablet        Follow-up and Dispositions    · Return in about 6 weeks (around 10/21/2019), or if symptoms worsen or fail to improve, for post test.

## 2019-09-09 NOTE — PATIENT INSTRUCTIONS
Medications Discontinued During This Encounter   Medication Reason    albuterol-ipratropium (DUO-NEB) 2.5 mg-0.5 mg/3 ml nebu      Please call us 1 to 2 days after the blood test to check on the report whether you need a blood transfusion. A Healthy Heart: Care Instructions  Your Care Instructions    Heart disease occurs when a substance called plaque builds up in the vessels that supply oxygen-rich blood to your heart. This can narrow the blood vessels and reduce blood flow. A heart attack happens when blood flow is completely blocked. A high-fat diet, smoking, and other factors increase the risk of heart disease. Your doctor has found that you have a chance of having heart disease. You can do lots of things to keep your heart healthy. It may not be easy, but you can change your diet, exercise more, and quit smoking. These steps really work to lower your chance of heart disease. Follow-up care is a key part of your treatment and safety. Be sure to make and go to all appointments, and call your doctor if you are having problems. It's also a good idea to know your test results and keep a list of the medicines you take. How can you care for yourself at home? Diet    · Use less salt when you cook and eat. This helps lower your blood pressure. Taste food before salting. Add only a little salt when you think you need it. With time, your taste buds will adjust to less salt.     · Eat fewer snack items, fast foods, canned soups, and other high-salt, high-fat, processed foods.     · Read food labels and try to avoid saturated and trans fats. They increase your risk of heart disease by raising cholesterol levels.     · Limit the amount of solid fat-butter, margarine, and shortening-you eat. Use olive, peanut, or canola oil when you cook. Bake, broil, and steam foods instead of frying them.     · Eating fish can lower your risk for heart disease. Eat at least 2 servings of fish a week.  [place], mackerel, herring, sardines, and chunk light tuna are very good choices. These fish contain omega-3 fatty acids.     · Eat a variety of fruit and vegetables every day. Dark green, deep orange, red, or yellow fruits and vegetables are especially good for you. Examples include spinach, carrots, peaches, and berries.     · Foods high in fiber can reduce your cholesterol and provide important vitamins and minerals. High-fiber foods include whole-grain cereals and breads, oatmeal, beans, brown rice, citrus fruits, and apples.     · Limit drinks and foods with added sugar. These include candy, desserts, and soda pop.    Lifestyle changes    · If your doctor recommends it, get more exercise. Walking is a good choice. Bit by bit, increase the amount you walk every day. Try for at least 30 minutes on most days of the week. You also may want to swim, bike, or do other activities.     · Do not smoke. If you need help quitting, talk to your doctor about stop-smoking programs and medicines. These can increase your chances of quitting for good. Quitting smoking may be the most important step you can take to protect your heart. It is never too late to quit. You will get health benefits right away.     · Limit alcohol to 2 drinks a day for men and 1 drink a day for women. Too much alcohol can cause health problems. Medicines    · Take your medicines exactly as prescribed. Call your doctor if you think you are having a problem with your medicine.     · If your doctor recommends aspirin, take the amount directed each day. Make sure you take aspirin and not another kind of pain reliever, such as acetaminophen (Tylenol). If you take ibuprofen (such as Advil or Motrin) for other problems, take aspirin at least 2 hours before taking ibuprofen. When should you call for help? Call 911 if you have symptoms of a heart attack.  These may include:    · Chest pain or pressure, or a strange feeling in the chest.     · Sweating.     · Shortness of breath.     · Pain, pressure, or a strange feeling in the back, neck, jaw, or upper belly or in one or both shoulders or arms.     · Lightheadedness or sudden weakness.     · A fast or irregular heartbeat.    After you call 911, the  may tell you to chew 1 adult-strength or 2 to 4 low-dose aspirin. Wait for an ambulance. Do not try to drive yourself.   Watch closely for changes in your health, and be sure to contact your doctor if you have any problems. Where can you learn more? Go to http://jason-charity.info/. Enter G935 in the search box to learn more about \"A Healthy Heart: Care Instructions. \"  Current as of: July 22, 2018  Content Version: 12.1  © 9772-2990 [employer]. Care instructions adapted under license by [employer] (which disclaims liability or warranty for this information). If you have questions about a medical condition or this instruction, always ask your healthcare professional. [address] any warranty or liability for your use of this information.

## 2019-09-10 ENCOUNTER — OFFICE VISIT (OUTPATIENT)
Dept: ORTHOPEDIC SURGERY | Facility: CLINIC | Age: 69
End: 2019-09-10

## 2019-09-10 ENCOUNTER — HOSPITAL ENCOUNTER (OUTPATIENT)
Dept: LAB | Age: 69
Discharge: HOME | End: 2019-09-10

## 2019-09-10 VITALS
OXYGEN SATURATION: 95 % | TEMPERATURE: 95.8 F | SYSTOLIC BLOOD PRESSURE: 150 MMHG | HEART RATE: 103 BPM | WEIGHT: 218.8 LBS | BODY MASS INDEX: 37.36 KG/M2 | HEIGHT: 64 IN | DIASTOLIC BLOOD PRESSURE: 77 MMHG

## 2019-09-10 DIAGNOSIS — S92.351D DISPLACED FRACTURE OF FIFTH METATARSAL BONE, RIGHT FOOT, SUBSEQUENT ENCOUNTER FOR FRACTURE WITH ROUTINE HEALING: Primary | ICD-10-CM

## 2019-09-10 DIAGNOSIS — M25.571 RIGHT ANKLE PAIN, UNSPECIFIED CHRONICITY: ICD-10-CM

## 2019-09-10 LAB — XX-LABCORP SPECIMEN COL,LCBCF: NORMAL

## 2019-09-10 PROCEDURE — 99001 SPECIMEN HANDLING PT-LAB: CPT

## 2019-09-10 NOTE — PROGRESS NOTES
Patient: Danilo Wilkinson                MRN: [id number]       SSN: [id number]  YOB: 1950        AGE: 81 y.o. SEX: female  Body mass index is 37.56 kg/m². PCP: Joey Sims MD  09/10/19    Chief Complaint: Right foot/ankle pain    HISTORY OF PRESENT ILLNESS:  Carla Barclay returns to the office today for her right foot. She is complaining of some lateral ankle pain, but overall her foot pain has improved. No new injuries or trauma. Past Medical History:   Diagnosis Date    Abnormal WBC count 5/17/2016    Bilateral shoulder pain 9/27/2016    Chondromalacia of both patellae     COPD (chronic obstructive pulmonary disease) ([facility] [address] 9/2015    mild-mod dz; Dr Tonia Abarca    Diabetes [facility]) 2013    Diabetic eye exam ([facility] [address] [facility] 2016    Dilated cardiomyopathy ([facility] [address]     Dyslipidemia     Gout     Heart attack ([facility] [address] [facility]     Heart attack ([facility] [address] [facility]     History of echocardiogram 11/14/2014    Mild LVE. EF 40%. Mild, diffuse hypk. Mild LAE. Mild MR.      Hypercholesteremia 1/08/14    Hypertension 2000    Noncompliance with medications 2/16/2016    Obesity     Orthostatic hypotension 5/17/2016    Osteoarthritis of both knees     Pain management 04/01/2016    Dr. Flash Fowler Popliteal cyst, bilateral      Vitamin D deficiency 10/16/14       Family History   Problem Relation Age of Onset    Diabetes Mother     Hypertension Mother     Hypertension Father     Kidney Disease Maternal Aunt 50        Dialysis       Current Outpatient Medications   Medication Sig Dispense Refill    carvedilol (COREG) 12.5 mg tablet Take 1 Tab by mouth two (2) times daily (with meals). 6180 Tab 1    furosemide (LASIX) 40 mg tablet One tab 40 mg daily 30 Tab 0    sacubitril-valsartan (ENTRESTO) 97 mg/103 mg tablet Take 1 Tab by mouth two (2) times a day. 60 Tab 6    aspirin 81 mg chewable tablet Take 2 Tabs by mouth daily.  60 Tab 0    raNITIdine (ZANTAC) 150 mg tablet Take 1 Tab by mouth two (2) times a day. 60 Tab 4    atorvastatin (LIPITOR) 20 mg tablet Take 1 Tab by mouth nightly. 30 Tab 0    insulin detemir (LEVEMIR) 100 unit/mL injection 15 units daily for diabetes 2 Vial 0    umeclidinium-vilanterol (ANORO ELLIPTA) 62.5-25 mcg/actuation inhaler Take 1 Puff by inhalation daily. For COPD 3 Inhaler 3    ferrous sulfate 325 mg (65 mg iron) tablet Take 1 Tab by mouth two (2) times a day. Indications: IRON DEFICIENCY ANEMIA 60 Tab 11    desloratadine (CLARINEX) 5 mg tablet Take 1 Tab by mouth daily as needed for Allergies. 30 Tab 0    mometasone (NASONEX) 50 mcg/actuation nasal spray 2 Sprays by Both Nostrils route daily as needed.  For allergies 3 Container 3       No Known Allergies    Past Surgical History:   Procedure Laterality Date    HX HEART CATHETERIZATION      HX TUBAL LIGATION         Social History     Socioeconomic History    Marital status: LEGALLY      Spouse name: Not on file    Number of children: 3    Years of education: Not on file    Highest education level: Not on file   Occupational History    Occupation: retired   Social Needs    Financial resource strain: Not on file    Food insecurity:     Worry: Not on file     Inability: Not on file   [employer] needs:     Medical: Not on file     Non-medical: Not on file   Tobacco Use    Smoking status: Former Smoker     Packs/day: 0.25     Years: 48.00     Pack years: 12.00     Types: Cigarettes     Last attempt to quit: 2018     Years since quittin.3    Smokeless tobacco: Never Used   Substance and Sexual Activity    Alcohol use: No     Alcohol/week: 0.0 standard drinks    Drug use: No    Sexual activity: Yes     Partners: Male   Lifestyle    Physical activity:     Days per week: Not on file     Minutes per session: Not on file    Stress: Not on file   Relationships    Social connections:     Talks on phone: Not on file     Gets together: Not on file     Attends Bahai service: Not on file     Active member of club or organization: Not on file     Attends meetings of clubs or organizations: Not on file     Relationship status: Not on file    Intimate partner violence:     Fear of current or ex partner: Not on file     Emotionally abused: Not on file     Physically abused: Not on file     Forced sexual activity: Not on file   Other Topics Concern     Service No    Blood Transfusions No    Caffeine Concern No    Occupational Exposure No    Hobby Hazards No    Sleep Concern No    Stress Concern No    Weight Concern No    Special Diet No    Back Care No    Exercise No    Bike Helmet No    Seat Belt Yes    Self-Exams Yes   Social History Narrative    Not on file       REVIEW OF SYSTEMS:      No changes from previous review of systems unless noted. PHYSICAL EXAMINATION:  Visit Vitals  /77   Pulse (!) 103   Temp 95.8 °F (35.4 °C) (Oral)   Ht 5' 4\" (1.626 m)   Wt 218 lb 12.8 oz (99.2 kg)   SpO2 95%   BMI 37.56 kg/m²     Body mass index is 37.56 kg/m². GENERAL: Alert and oriented x3, in no acute distress. HEENT: Normocephalic, atraumatic. RESP: Non labored breathing. SKIN: No rashes or lesions noted. PHYSICAL EXAM:  Physical exam of the right foot is nontender to palpation over the fifth metatarsal.  No obvious deformity. She does have some swelling. Otherwise, nontender about the foot. The ankle examination reveals some tenderness to palpation over the lateral ankle ligaments. She has some pain with an inversion stress, as well as an eversion stress. No crepitus with ankle range of motion. Mild swelling over the lateral ankle as well. IMAGING:  X-rays of the foot and ankle were taken in the office today. There is a healed fracture of the fifth metatarsal.  There is some slight displacement of the healed fracture. The x-rays of the ankle do not show any significant ankle pathology.       ASSESSMENT AND PLAN:   Skip Cheek is a 81year-old female with a healed foot fifth metatarsal fracture and some mild lateral ankle pain. I have recommended conservative management. She can wear the boot as needed for this. She can also take antiinflammatories as needed. I will see her back as needed.               Electronically signed by: Phuc Ahuja MD

## 2019-09-11 ENCOUNTER — TELEPHONE (OUTPATIENT)
Dept: CARDIOLOGY CLINIC | Age: 69
End: 2019-09-11

## 2019-09-11 DIAGNOSIS — E78.5 DYSLIPIDEMIA, GOAL LDL BELOW 70: ICD-10-CM

## 2019-09-11 DIAGNOSIS — I50.22 CHRONIC SYSTOLIC CONGESTIVE HEART FAILURE (HCC): Primary | ICD-10-CM

## 2019-09-11 LAB
CHOLEST SERPL-MCNC: 168 MG/DL (ref 100–199)
HCT VFR BLD AUTO: 28.4 % (ref 34–46.6)
HDLC SERPL-MCNC: 55 MG/DL
HGB BLD-MCNC: 8.7 G/DL (ref 11.1–15.9)
IRON SATN MFR SERPL: 7 % (ref 15–55)
IRON SERPL-MCNC: 19 UG/DL (ref 27–139)
LDLC SERPL CALC-MCNC: 100 MG/DL (ref 0–99)
SPECIMEN STATUS REPORT, ROLRST: NORMAL
TIBC SERPL-MCNC: 279 UG/DL (ref 250–450)
TRIGL SERPL-MCNC: 67 MG/DL (ref 0–149)
UIBC SERPL-MCNC: 260 UG/DL (ref 118–369)
VLDLC SERPL CALC-MCNC: 13 MG/DL (ref 5–40)

## 2019-09-11 NOTE — TELEPHONE ENCOUNTER
Called and let detail message on patient phone regarding lab/test and instructions per Dr. Michael Marley, Change Lipitor 40 mg/day. Get fasting Lipid/LFt in 6 weeks. Please reinforce diet and exercise. If any questions to please call office.

## 2019-09-11 NOTE — PROGRESS NOTES
Please contact patient and do the following asap    Change lipitor to 40 mg/day  Get fasting Lipid profile and LFTs in 6 wks. Please reinforce diet and exercise.

## 2019-09-11 NOTE — TELEPHONE ENCOUNTER
----- Message from Jaime Romero MD sent at 9/11/2019  1:53 PM EDT -----  Please contact patient and do the following asap    Change lipitor to 40 mg/day  Get fasting Lipid profile and LFTs in 6 wks. Please reinforce diet and exercise.

## 2019-09-30 ENCOUNTER — HOSPITAL ENCOUNTER (EMERGENCY)
Age: 69
Discharge: HOME | End: 2019-09-30
Attending: EMERGENCY MEDICINE
Payer: MEDICARE

## 2019-09-30 ENCOUNTER — APPOINTMENT (OUTPATIENT)
Dept: GENERAL RADIOLOGY | Age: 69
End: 2019-09-30
Attending: EMERGENCY MEDICINE
Payer: MEDICARE

## 2019-09-30 VITALS
SYSTOLIC BLOOD PRESSURE: 146 MMHG | TEMPERATURE: 98.7 F | HEART RATE: 94 BPM | DIASTOLIC BLOOD PRESSURE: 83 MMHG | OXYGEN SATURATION: 100 % | WEIGHT: 216 LBS | BODY MASS INDEX: 37.08 KG/M2 | RESPIRATION RATE: 27 BRPM

## 2019-09-30 DIAGNOSIS — J44.1 ACUTE EXACERBATION OF CHRONIC OBSTRUCTIVE PULMONARY DISEASE (COPD) (HCC): Primary | ICD-10-CM

## 2019-09-30 DIAGNOSIS — Z86.79 HISTORY OF CHF (CONGESTIVE HEART FAILURE): ICD-10-CM

## 2019-09-30 LAB
ANION GAP SERPL CALC-SCNC: 8 MMOL/L (ref 3–18)
ARTERIAL PATENCY WRIST A: YES
BASE DEFICIT BLD-SCNC: 5 MMOL/L
BASOPHILS # BLD: 0 K/UL (ref 0–0.1)
BASOPHILS NFR BLD: 0 % (ref 0–2)
BDY SITE: ABNORMAL
BUN SERPL-MCNC: 20 MG/DL (ref 7–18)
BUN/CREAT SERPL: 11 (ref 12–20)
CALCIUM SERPL-MCNC: 8.5 MG/DL (ref 8.5–10.1)
CHLORIDE SERPL-SCNC: 115 MMOL/L (ref 100–111)
CK MB CFR SERPL CALC: NORMAL % (ref 0–4)
CK MB SERPL-MCNC: <1 NG/ML (ref 5–25)
CK SERPL-CCNC: 91 U/L (ref 26–192)
CO2 SERPL-SCNC: 22 MMOL/L (ref 21–32)
CREAT SERPL-MCNC: 1.89 MG/DL (ref 0.6–1.3)
DIFFERENTIAL METHOD BLD: ABNORMAL
EOSINOPHIL # BLD: 0.1 K/UL (ref 0–0.4)
EOSINOPHIL NFR BLD: 2 % (ref 0–5)
ERYTHROCYTE [DISTWIDTH] IN BLOOD BY AUTOMATED COUNT: 17.1 % (ref 11.6–14.5)
GAS FLOW.O2 O2 DELIVERY SYS: ABNORMAL L/MIN
GLUCOSE SERPL-MCNC: 147 MG/DL (ref 74–99)
HCO3 BLD-SCNC: 19.2 MMOL/L (ref 22–26)
HCT VFR BLD AUTO: 31.2 % (ref 35–45)
HGB BLD-MCNC: 9.2 G/DL (ref 12–16)
LYMPHOCYTES # BLD: 1.6 K/UL (ref 0.9–3.6)
LYMPHOCYTES NFR BLD: 27 % (ref 21–52)
MCH RBC QN AUTO: 25.1 PG (ref 24–34)
MCHC RBC AUTO-ENTMCNC: 29.5 G/DL (ref 31–37)
MCV RBC AUTO: 85.2 FL (ref 74–97)
MONOCYTES # BLD: 0.4 K/UL (ref 0.05–1.2)
MONOCYTES NFR BLD: 8 % (ref 3–10)
NEUTS SEG # BLD: 3.7 K/UL (ref 1.8–8)
NEUTS SEG NFR BLD: 63 % (ref 40–73)
O2/TOTAL GAS SETTING VFR VENT: 21 %
PCO2 BLD: 31.6 MMHG (ref 35–45)
PH BLD: 7.39 [PH] (ref 7.35–7.45)
PLATELET # BLD AUTO: 412 K/UL (ref 135–420)
PMV BLD AUTO: 9.8 FL (ref 9.2–11.8)
PO2 BLD: 192 MMHG (ref 80–100)
POTASSIUM SERPL-SCNC: 4.2 MMOL/L (ref 3.5–5.5)
RBC # BLD AUTO: 3.66 M/UL (ref 4.2–5.3)
SAO2 % BLD: 100 % (ref 92–97)
SERVICE CMNT-IMP: ABNORMAL
SODIUM SERPL-SCNC: 145 MMOL/L (ref 136–145)
SPECIMEN TYPE: ABNORMAL
TOTAL RESP. RATE, ITRR: 24
TROPONIN I SERPL-MCNC: <0.02 NG/ML (ref 0–0.04)
WBC # BLD AUTO: 5.8 K/UL (ref 4.6–13.2)

## 2019-09-30 PROCEDURE — 94762 N-INVAS EAR/PLS OXIMTRY CONT: CPT

## 2019-09-30 PROCEDURE — 74011000250 HC RX REV CODE- 250: Performed by: EMERGENCY MEDICINE

## 2019-09-30 PROCEDURE — 82803 BLOOD GASES ANY COMBINATION: CPT

## 2019-09-30 PROCEDURE — 99284 EMERGENCY DEPT VISIT MOD MDM: CPT

## 2019-09-30 PROCEDURE — 36600 WITHDRAWAL OF ARTERIAL BLOOD: CPT

## 2019-09-30 PROCEDURE — 80048 BASIC METABOLIC PNL TOTAL CA: CPT

## 2019-09-30 PROCEDURE — 71045 X-RAY EXAM CHEST 1 VIEW: CPT

## 2019-09-30 PROCEDURE — 94640 AIRWAY INHALATION TREATMENT: CPT

## 2019-09-30 PROCEDURE — 85025 COMPLETE CBC W/AUTO DIFF WBC: CPT

## 2019-09-30 PROCEDURE — 96374 THER/PROPH/DIAG INJ IV PUSH: CPT

## 2019-09-30 PROCEDURE — 74011250636 HC RX REV CODE- 250/636: Performed by: EMERGENCY MEDICINE

## 2019-09-30 PROCEDURE — 82550 ASSAY OF CK (CPK): CPT

## 2019-09-30 PROCEDURE — 93005 ELECTROCARDIOGRAM TRACING: CPT

## 2019-09-30 RX ORDER — IPRATROPIUM BROMIDE AND ALBUTEROL SULFATE 2.5; .5 MG/3ML; MG/3ML
3 SOLUTION RESPIRATORY (INHALATION) ONCE
Status: COMPLETED | OUTPATIENT
Start: 2019-09-30 | End: 2019-09-30

## 2019-09-30 RX ORDER — ALBUTEROL SULFATE 90 UG/1
2 AEROSOL, METERED RESPIRATORY (INHALATION)
Qty: 1 INHALER | Refills: 0 | Status: SHIPPED | OUTPATIENT
Start: 2019-09-30 | End: 2022-07-08

## 2019-09-30 RX ORDER — PREDNISONE 20 MG/1
60 TABLET ORAL DAILY
Qty: 15 TAB | Refills: 0 | Status: SHIPPED | OUTPATIENT
Start: 2019-09-30 | End: 2019-10-05

## 2019-09-30 RX ADMIN — IPRATROPIUM BROMIDE AND ALBUTEROL SULFATE 3 ML: .5; 3 SOLUTION RESPIRATORY (INHALATION) at 17:30

## 2019-09-30 RX ADMIN — METHYLPREDNISOLONE SODIUM SUCCINATE 125 MG: 125 INJECTION, POWDER, FOR SOLUTION INTRAMUSCULAR; INTRAVENOUS at 17:30

## 2019-09-30 NOTE — DISCHARGE INSTRUCTIONS
SPECIFIC PATIENT INSTRUCTIONS FROM THE PHYSICIAN WHO TREATED YOU IN THE ER TODAY:  1. Return if any concerns or worsening of condition(s)  2. Steroids as prescribed until finished. 3. Use the albuterol inhaler as prescribed for wheezing and/or cough. 4. FOLLOW UP APPOINTMENT:  Your primary doctor in 1-2 days. Patient Education        Chronic Obstructive Pulmonary Disease (COPD): Care Instructions  Your Care Instructions    Chronic obstructive pulmonary disease (COPD) is a general term for a group of lung diseases, including emphysema and chronic bronchitis. People with COPD have decreased airflow in and out of the lungs, which makes it hard to breathe. The airways also can get clogged with thick mucus. Cigarette smoking is a major cause of COPD. Although there is no cure for COPD, you can slow its progress. Following your treatment plan and taking care of yourself can help you feel better and live longer. Follow-up care is a key part of your treatment and safety. Be sure to make and go to all appointments, and call your doctor if you are having problems. It's also a good idea to know your test results and keep a list of the medicines you take. How can you care for yourself at home?   Staying healthy    · Do not smoke. This is the most important step you can take to prevent more damage to your lungs. If you need help quitting, talk to your doctor about stop-smoking programs and medicines. These can increase your chances of quitting for good.     · Avoid colds and flu. Get a pneumococcal vaccine shot. If you have had one before, ask your doctor whether you need a second dose. Get the flu vaccine every fall. If you must be around people with colds or the flu, wash your hands often.     · Avoid secondhand smoke, air pollution, and high altitudes. Also avoid cold, dry air and hot, humid air.  Stay at home with your windows closed when air pollution is bad.    Medicines and oxygen therapy    · Take your medicines exactly as prescribed. Call your doctor if you think you are having a problem with your medicine.     · You may be taking medicines such as:  ? Bronchodilators. These help open your airways and make breathing easier. Bronchodilators are either short-acting (work for 6 to 9 hours) or long-acting (work for 24 hours). You inhale most bronchodilators, so they start to act quickly. Always carry your quick-relief inhaler with you in case you need it while you are away from home. ? Corticosteroids (prednisone, budesonide). These reduce airway inflammation. They come in pill or inhaled form. You must take these medicines every day for them to work well.     · A spacer may help you get more inhaled medicine to your lungs. Ask your doctor or pharmacist if a spacer is right for you. If it is, ask how to use it properly.     · Do not take any vitamins, over-the-counter medicine, or herbal products without talking to your doctor first.     · If your doctor prescribed antibiotics, take them as directed. Do not stop taking them just because you feel better. You need to take the full course of antibiotics.     · Oxygen therapy boosts the amount of oxygen in your blood and helps you breathe easier. Use the flow rate your doctor has recommended, and do not change it without talking to your doctor first.   Activity    · Get regular exercise. Walking is an easy way to get exercise. Start out slowly, and walk a little more each day.     · Pay attention to your breathing. You are exercising too hard if you cannot talk while you are exercising.     · Take short rest breaks when doing household chores and other activities.     · Learn breathing methods--such as breathing through pursed lips--to help you become less short of breath.     · If your doctor has not set you up with a pulmonary rehabilitation program, talk to him or her about whether rehab is right for you.  Rehab includes exercise programs, education about your disease and how to manage it, help with diet and other changes, and emotional support. Diet    · Eat regular, healthy meals. Use bronchodilators about 1 hour before you eat to make it easier to eat. Eat several small meals instead of three large ones. Drink beverages at the end of the meal. Avoid foods that are hard to chew.     · Eat foods that contain protein so that you do not lose muscle mass.     · Talk with your doctor if you gain too much weight or if you lose weight without trying.    Mental health    · Talk to your family, friends, or a therapist about your feelings. It is normal to feel frightened, angry, hopeless, helpless, and even guilty. Talking openly about bad feelings can help you cope. If these feelings last, talk to your doctor. When should you call for help? Call 911 anytime you think you may need emergency care. For example, call if:    · You have severe trouble breathing.    Call your doctor now or seek immediate medical care if:    · You have new or worse trouble breathing.     · You cough up blood.     · You have a fever.    Watch closely for changes in your health, and be sure to contact your doctor if:    · You cough more deeply or more often, especially if you notice more mucus or a change in the color of your mucus.     · You have new or worse swelling in your legs or belly.     · You are not getting better as expected. Where can you learn more? Go to http://jason-charity.info/. Alexis Escamilla in the search box to learn more about \"Chronic Obstructive Pulmonary Disease (COPD): Care Instructions. \"  Current as of: June 9, 2019  Content Version: 12.2  © 6483-1159 [employer]. Care instructions adapted under license by "[employer]" (which disclaims liability or warranty for this information).  If you have questions about a medical condition or this instruction, always ask your healthcare professional. Dandy Cope disclaims any warranty or liability for your use of this information. Patient Education        COPD Exacerbation Plan: Care Instructions  Your Care Instructions    If you have chronic obstructive pulmonary disease (COPD), your usual shortness of breath could suddenly get worse. You may start coughing more and have more mucus. This flare-up is called a COPD exacerbation (say \"bl-NFU-tl-BAY-masood\"). A lung infection or air pollution could set off an exacerbation. Sometimes it can happen after a quick change in temperature or being around chemicals. Work with your doctor to make a plan for dealing with an exacerbation. You can better manage it if you plan ahead. Follow-up care is a key part of your treatment and safety. Be sure to make and go to all appointments, and call your doctor if you are having problems. It's also a good idea to know your test results and keep a list of the medicines you take. How can you care for yourself at home? During an exacerbation  · Do not panic if you start to have one. Quick treatment at home may help you prevent serious breathing problems. If you have a COPD exacerbation plan that you developed with your doctor, follow it. · Take your medicines exactly as your doctor tells you.  ? Use your inhaler as directed by your doctor. If your symptoms do not get better after you use your medicine, have someone take you to the emergency room. Call an ambulance if necessary. ? With inhaled medicines, a spacer or a nebulizer may help you get more medicine to your lungs. Ask your doctor or pharmacist how to use them properly. Practice using the spacer in front of a mirror before you have an exacerbation. This may help you get the medicine into your lungs quickly. ? If your doctor has given you steroid pills, take them as directed. ? Your doctor may have given you a prescription for antibiotics, which you can fill if you need it. ? Talk to your doctor if you have any problems with your medicine.  And call your doctor if you have to use your antibiotic or steroid pills. Preventing an exacerbation  · Do not smoke. This is the most important step you can take to prevent more damage to your lungs and prevent problems. If you already smoke, it is never too late to stop. If you need help quitting, talk to your doctor about stop-smoking programs and medicines. These can increase your chances of quitting for good. · Take your daily medicines as prescribed. · Avoid colds and flu. ? Get a pneumococcal vaccine. ? Get a flu vaccine each year, as soon as it is available. Ask those you live or work with to do the same, so they will not get the flu and infect you. ? Try to stay away from people with colds or the flu. ? Wash your hands often. · Avoid secondhand smoke; air pollution; cold, dry air; hot, humid air; and high altitudes. Stay at home with your windows closed when air pollution is bad. · Learn breathing techniques for COPD, such as breathing through pursed lips. These techniques can help you breathe easier during an exacerbation. When should you call for help? Call 911 anytime you think you may need emergency care. For example, call if:    · You have severe trouble breathing.     · You have severe chest pain.    Call your doctor now or seek immediate medical care if:    · You have new or worse shortness of breath.     · You develop new chest pain.     · You are coughing more deeply or more often, especially if you notice more mucus or a change in the color of your mucus.     · You cough up blood.     · You have new or increased swelling in your legs or belly.     · You have a fever.    Watch closely for changes in your health, and be sure to contact your doctor if:    · You need to use your antibiotic or steroid pills.     · Your symptoms are getting worse. Where can you learn more? Go to http://jason-charity.info/.   Enter S289 in the search box to learn more about \"COPD Exacerbation Plan: Care Instructions. \"  Current as of: 2019  Content Version: 12.2  © 3103-2028 [employer]. Care instructions adapted under license by [employer] (which disclaims liability or warranty for this information). If you have questions about a medical condition or this instruction, always ask your healthcare professional. [address] any warranty or liability for your use of this information. MyChart Activation    Thank you for requesting access to Meeting[employer]. Please follow the instructions below to securely access and download your online medical record. MeetingSprout allows you to send messages to your doctor, view your test results, renew your prescriptions, schedule appointments, and more. How Do I Sign Up? In your internet browser, go to [email or web address]. Click on the First Time User? Click Here link in the Sign In box. You will see the New Member Sign Up page. Enter your MeetingSprout Access Code exactly as it appears below. You will not need to use this code after you´ve completed the sign-up process. If you do not sign up before the expiration date, you must request a new code. MeetingSprout Access Code: [email or web address]  Expires: 3/28/2019  2:27 PM (This is the date your MeetingSprout access code will )    Enter the last four digits of your Social Security Number (xxxx) and Date of Birth (mm/dd/yyyy) as indicated and click Submit. You will be taken to the next sign-up page. Create a MeetingSprout ID. This will be your MeetingSprout login ID and cannot be changed, so think of one that is secure and easy to remember. Create a MeetingSprout password. You can change your password at any time. Enter your Password Reset Question and Answer. This can be used at a later time if you forget your password. Enter your e-mail address. You will receive e-mail notification when new information is available in [address] Click Sign Up.  You can now view and download portions of your medical record. Click the Suda link to download a portable copy of your medical information. Additional Information    If you have questions, please visit the Frequently Asked Questions section of the [employer] website at [email or web address] Remember, The Personal [employer] is NOT to be used for urgent needs. For medical emergencies, dial 911.

## 2019-09-30 NOTE — ED PROVIDER NOTES
[employer]  [facility] EMERGENCY DEPT      5:21 PM    Date: 9/30/2019  Patient Name: Brian Cronin    History of Presenting Illness     Chief Complaint   Patient presents with    Shortness of Breath       81 y.o. female with noted past medical history who presents to the emergency department with shortness of breath and wheezing x2 days. Patient is a known COPD patient with a history of CHF as well. She has been compliant with her Lasix for CHF. She states that over the last day she is had worsening shortness of breath wheezing that is not relieved with her home inhaler and nebulizer machine. She has no URI symptoms. No fever chills. No other complaints. Patient denies any other associated signs or symptoms. Patient denies any other complaints. Nursing notes regarding the HPI and triage nursing notes were reviewed. Prior medical records were reviewed. Current Outpatient Medications   Medication Sig Dispense Refill    carvedilol (COREG) 12.5 mg tablet Take 1 Tab by mouth two (2) times daily (with meals). 6180 Tab 1    furosemide (LASIX) 40 mg tablet One tab 40 mg daily 30 Tab 0    sacubitril-valsartan (ENTRESTO) 97 mg/103 mg tablet Take 1 Tab by mouth two (2) times a day. 60 Tab 6    aspirin 81 mg chewable tablet Take 2 Tabs by mouth daily. 60 Tab 0    raNITIdine (ZANTAC) 150 mg tablet Take 1 Tab by mouth two (2) times a day. 60 Tab 4    atorvastatin (LIPITOR) 20 mg tablet Take 1 Tab by mouth nightly. (Patient taking differently: Take 40 mg by mouth nightly.) 30 Tab 0    insulin detemir (LEVEMIR) 100 unit/mL injection 15 units daily for diabetes 2 Vial 0    desloratadine (CLARINEX) 5 mg tablet Take 1 Tab by mouth daily as needed for Allergies. 30 Tab 0    mometasone (NASONEX) 50 mcg/actuation nasal spray 2 Sprays by Both Nostrils route daily as needed. For allergies 3 Container 3    umeclidinium-vilanterol (ANORO ELLIPTA) 62.5-25 mcg/actuation inhaler Take 1 Puff by inhalation daily. For COPD 3 Inhaler 3    ferrous sulfate 325 mg (65 mg iron) tablet Take 1 Tab by mouth two (2) times a day. Indications: IRON DEFICIENCY ANEMIA 60 Tab 11       Past History     Past Medical History:  Past Medical History:   Diagnosis Date    Abnormal WBC count 2016    Bilateral shoulder pain 2016    Chondromalacia of both patellae     COPD [facility]ronic obstructive pulmonary disease) [facility] [address] 2015    mild-mod dz; Dr Amalia Reese    Diabetes [facility])     Diabetic eye exam ([facility] [address]     Dilated cardiomyopathy ([facility] [address]     Dyslipidemia     Gout     Heart attack ([facility] [address]     Heart attack ([facility] [address]     History of echocardiogram 2014    Mild LVE. EF 40%. Mild, diffuse hypk. Mild LAE. Mild MR.      Hypercholesteremia 14    Hypertension     Noncompliance with medications 2016    Obesity     Orthostatic hypotension 2016    Osteoarthritis of both knees     Pain management 2016    Dr. Khan Och Popliteal cyst, bilateral      Vitamin D deficiency 10/16/14       Past Surgical History:  Past Surgical History:   Procedure Laterality Date    HX HEART CATHETERIZATION      HX TUBAL LIGATION         Family History:  Family History   Problem Relation Age of Onset    Diabetes Mother     Hypertension Mother     Hypertension Father     Kidney Disease Maternal Aunt 48        Dialysis       Social History:  Social History     Tobacco Use    Smoking status: Former Smoker     Packs/day: 0.25     Years: 48.00     Pack years: 12.00     Types: Cigarettes     Last attempt to quit: 2018     Years since quittin.3    Smokeless tobacco: Never Used   Substance Use Topics    Alcohol use: No     Alcohol/week: 0.0 standard drinks    Drug use: No       Allergies:  No Known Allergies    Patient's primary care provider (as noted in [facility]): Devan Bills MD    Review of Systems   Constitutional: Negative for diaphoresis. HENT: Negative for congestion.     Eyes: Negative for discharge. Respiratory: Positive for shortness of breath and wheezing. Negative for stridor. Cardiovascular: Negative for palpitations. Gastrointestinal: Negative for diarrhea. Genitourinary: Negative for flank pain. Musculoskeletal: Negative for back pain. Neurological: Negative for weakness. Psychiatric/Behavioral: Negative for hallucinations. All other systems reviewed and are negative. Visit Vitals  /83 (BP 1 Location: Left arm, BP Patient Position: At rest)   Pulse 93   Temp 98.7 °F (37.1 °C)   Resp (!) 32   Wt 98 kg (216 lb)   SpO2 100%   BMI 37.08 kg/m²       PHYSICAL EXAM:    CONSTITUTIONAL:  Alert, in no apparent distress;  well developed;  well nourished. HEAD:  Normocephalic, atraumatic. EYES:  EOMI. Non-icteric sclera. Normal conjunctiva. ENTM:  Nose:  no rhinorrhea. Throat:  no erythema or exudate, mucous membranes moist.  NECK:  No JVD. Supple    RESPIRATORY:  Diffuse whole expiratory wheeze with good air movement. CARDIOVASCULAR:  Regular rate and rhythm. No murmurs, rubs, or gallops. GI:  Normal bowel sounds, abdomen soft and non-tender. No rebound or guarding. BACK:  Non-tender. UPPER EXT:  Normal inspection. LOWER EXT:  No edema, no calf tenderness. Distal pulses intact. NEURO:  Moves all four extremities, and grossly normal motor exam.  SKIN:  No rashes;  Normal for age. PSYCH:  Alert and normal affect. DIFFERENTIAL DIAGNOSES/ MEDICAL DECISION MAKING:   Shortness of breath etiologies include chronic obstructive pulmonary disease (COPD), acute asthma exacerbation, congestive heart failure, pneumonia, acute bronchitis, pulmonary embolism, upper respiratory infection, cardiac event to include acute coronary syndrome, acute myocardial infarction or a combination of the above (ex URI on top of COPD thus causing respiratory distress).       Diagnostic Study Results     Abnormal lab results from this emergency department encounter:  Labs Reviewed - No data to display    Lab values for this patient within approximately the last 12 hours:  No results found for this or any previous visit (from the past 12 hour(s)). Radiologist and cardiologist interpretations if available at time of this note:  No results found. Emergency physician interpretation of EKG: Normal sinus rhythm about 95 bpm    Medication(s) ordered for patient during this emergency visit encounter:  Medications - No data to display    Medical Decision Making     I am the first provider for this patient. I reviewed the vital signs, available nursing notes, past medical history, past surgical history, family history and social history. Vital Signs:  Reviewed the patient's vital signs. ED COURSE:    Critical Care Note:  Respiratory Distress    Critical care minutes: 39 MINUTES. Given patient's initial arrival in respiratory distress, numerous serial reevaluations of the patient's respiratory status and patient's response to various medical interventions. Given the patients underlying condition medical intervention(s) were needed, requiring numerous reevaluations of patient's vital signs and response to different emergency department therapies, total bedside time evaluating and/or treating the patient, not including procedures, is noted below. IMPRESSION AND MEDICAL DECISION MAKING:  Based upon the patient's presentation with noted HPI and PE, along with the work up done in the emergency department, I believe that the patient is having an acute COPD exacerbation. The patients respiratory status improved in the emergency department with noted HHN treatments, steroids, and other noted modalities and medications. I believe that the patient has improved sufficiently enough for discharge home. DIAGNOSIS:    1. Acute COPD exacerbation. SPECIFIC PATIENT INSTRUCTIONS FROM THE PHYSICIAN WHO TREATED YOU IN THE ER TODAY:  1.  Return if any concerns or worsening of condition(s)  2. Steroids as prescribed until finished. 3. Use the albuterol inhaler as prescribed for wheezing and/or cough. 4. FOLLOW UP APPOINTMENT:  Your primary doctor in 1-2 days. Patient is improved, resting quietly and comfortably. The patient will be discharged home. The patient was reassured that these symptoms do not appear to represent a serious or life threatening condition at this time. Warning signs of worsening condition were discussed and understood by the patient. Based on patient's age, coexisting illness, exam, and the results of this ED evaluation, the decision to treat as an outpatient was made. Based on the information available at time of discharge, acute pathology requiring immediate intervention was deemed relative unlikely. While it is impossible to completely exclude the possibility of underlying serious disease or worsening of condition, I feel the relative likelihood is extremely low. I discussed this uncertainty with the patient, who understood ED evaluation and treatment and felt comfortable with the outpatient treatment plan. All questions regarding care, test results, and follow up were answered. The patient is stable and appropriate to discharge. They understand that they should return to the emergency department for any new or worsening symptoms. I stressed the importance of follow up for repeat assessment and possibly further evaluation/treatment. Dictation disclaimer:  Please note that this dictation was completed with [employer], the computer voice recognition software. Quite often unanticipated grammatical, syntax, homophones, and other interpretive errors are inadvertently transcribed by the computer software. Please disregard these errors. Please excuse any errors that have escaped final proofreading. Coding Diagnoses     Clinical Impression:   1. Acute exacerbation of chronic obstructive pulmonary disease (COPD) ([facility] [address]    2.  History of CHF (congestive heart failure)        Disposition     Disposition: Discharge. Nicholas Lazo M.D. WILLIE Board Certified Emergency Physician    Provider Attestation:  If a scribe was utilized in generation of this patient record, I personally performed the services described in the documentation, reviewed the documentation, as recorded by the scribe in my presence, and it accurately records the patient's history of presenting illness, review of systems, patient physical examination, and procedures performed by me as the attending physician. Nicholas Lazo M.D.   WILLIE Board Certified Emergency Physician  9/30/2019.  5:23 PM

## 2019-10-01 LAB
ATRIAL RATE: 98 BPM
CALCULATED P AXIS, ECG09: 62 DEGREES
CALCULATED R AXIS, ECG10: 40 DEGREES
CALCULATED T AXIS, ECG11: 94 DEGREES
DIAGNOSIS, 93000: NORMAL
P-R INTERVAL, ECG05: 162 MS
Q-T INTERVAL, ECG07: 364 MS
QRS DURATION, ECG06: 78 MS
QTC CALCULATION (BEZET), ECG08: 464 MS
VENTRICULAR RATE, ECG03: 98 BPM

## 2019-10-10 ENCOUNTER — HOSPITAL ENCOUNTER (OUTPATIENT)
Dept: LAB | Age: 69
Discharge: HOME | End: 2019-10-10

## 2019-10-10 LAB — XX-LABCORP SPECIMEN COL,LCBCF: NORMAL

## 2019-10-10 PROCEDURE — 99001 SPECIMEN HANDLING PT-LAB: CPT

## 2019-10-11 ENCOUNTER — OFFICE VISIT (OUTPATIENT)
Dept: CARDIOLOGY CLINIC | Age: 69
End: 2019-10-11

## 2019-10-11 VITALS
DIASTOLIC BLOOD PRESSURE: 93 MMHG | SYSTOLIC BLOOD PRESSURE: 155 MMHG | WEIGHT: 232 LBS | BODY MASS INDEX: 39.61 KG/M2 | HEART RATE: 90 BPM | HEIGHT: 64 IN

## 2019-10-11 DIAGNOSIS — I25.5 ISCHEMIC CARDIOMYOPATHY: ICD-10-CM

## 2019-10-11 DIAGNOSIS — I50.23 ACUTE ON CHRONIC SYSTOLIC CONGESTIVE HEART FAILURE (HCC): Primary | ICD-10-CM

## 2019-10-11 DIAGNOSIS — E66.01 SEVERE OBESITY (BMI 35.0-39.9) WITH COMORBIDITY (HCC): ICD-10-CM

## 2019-10-11 DIAGNOSIS — I10 ESSENTIAL HYPERTENSION: ICD-10-CM

## 2019-10-11 DIAGNOSIS — I25.10 CORONARY ARTERY DISEASE INVOLVING NATIVE CORONARY ARTERY OF NATIVE HEART WITHOUT ANGINA PECTORIS: ICD-10-CM

## 2019-10-11 LAB
ALBUMIN SERPL-MCNC: 3.3 G/DL (ref 3.6–4.8)
ALP SERPL-CCNC: 104 IU/L (ref 39–117)
ALT SERPL-CCNC: 7 IU/L (ref 0–32)
AST SERPL-CCNC: 6 IU/L (ref 0–40)
BILIRUB DIRECT SERPL-MCNC: 0.1 MG/DL (ref 0–0.4)
BILIRUB SERPL-MCNC: 0.3 MG/DL (ref 0–1.2)
CHOLEST SERPL-MCNC: 152 MG/DL (ref 100–199)
HDLC SERPL-MCNC: 46 MG/DL
LDLC SERPL CALC-MCNC: 86 MG/DL (ref 0–99)
PROT SERPL-MCNC: 5.6 G/DL (ref 6–8.5)
SPECIMEN STATUS REPORT, ROLRST: NORMAL
TRIGL SERPL-MCNC: 101 MG/DL (ref 0–149)
VLDLC SERPL CALC-MCNC: 20 MG/DL (ref 5–40)

## 2019-10-11 RX ORDER — FUROSEMIDE 40 MG/1
40 TABLET ORAL 2 TIMES DAILY
Qty: 30 TAB | Refills: 0 | Status: SHIPPED | OUTPATIENT
Start: 2019-10-11 | End: 2020-02-13 | Stop reason: SDUPTHER

## 2019-10-11 RX ORDER — CARVEDILOL 6.25 MG/1
6.25 TABLET ORAL 2 TIMES DAILY WITH MEALS
Qty: 60 TAB | Refills: 2
Start: 2019-10-11 | End: 2020-02-13 | Stop reason: SDUPTHER

## 2019-10-11 NOTE — PROGRESS NOTES
HISTORY OF PRESENT ILLNESS  Lupillo Tinsley is a 81 y.o. female. History of anterior wall MI complicated by cardiac arrest due to acute stent closure requiring repeat PCI to her LAD with 2 YADIRA 5/2018        Cardiomyopathy   The history is provided by the patient and medical records. This is a chronic problem. The current episode started more than 1 week ago. The problem occurs constantly. The problem has not changed since onset. Associated symptoms include shortness of breath. Pertinent negatives include no chest pain and no headaches. The symptoms are aggravated by exertion. The symptoms are relieved by rest.   Hypertension   The history is provided by the patient and medical records. This is a chronic problem. The current episode started more than 1 week ago. Associated symptoms include shortness of breath. Pertinent negatives include no chest pain and no headaches. Shortness of Breath   The history is provided by the patient. This is a chronic problem. The problem occurs intermittently. The current episode started more than 1 week ago. Pertinent negatives include no fever, no headaches, no cough, no wheezing, no PND, no orthopnea, no chest pain, no vomiting, no rash, no leg swelling and no claudication. The problem's precipitants include exercise (1 flight; 10/19 50 ft). Cholesterol Problem   The history is provided by the medical records. This is a chronic problem. Associated symptoms include shortness of breath. Pertinent negatives include no chest pain and no headaches. Review of Systems   Constitutional: Negative for chills, fever, malaise/fatigue and weight loss. HENT: Negative for nosebleeds. Eyes: Negative for discharge. Respiratory: Positive for shortness of breath. Negative for cough and wheezing. Cardiovascular: Negative for chest pain, palpitations, orthopnea, claudication, leg swelling and PND. Gastrointestinal: Negative for diarrhea, nausea and vomiting.    Genitourinary: Negative for dysuria and hematuria. Musculoskeletal: Negative for falls and joint pain. Skin: Negative for rash. Neurological: Negative for dizziness, seizures, loss of consciousness and headaches. Endo/Heme/Allergies: Negative for polydipsia. Does not bruise/bleed easily. Psychiatric/Behavioral: Negative for depression and substance abuse. The patient does not have insomnia. No Known Allergies    Past Medical History:   Diagnosis Date    Abnormal WBC count 2016    Bilateral shoulder pain 2016    Chondromalacia of both patellae     COPD (chronic obstructive pulmonary disease) ([facility] [address] 2015    mild-mod dz; Dr Knight Fitting    Diabetes [facility])     Diabetic eye exam ([facility] [address]     Dilated cardiomyopathy ([facility] [address]     Dyslipidemia     Gout     Heart attack ([facility] [address]     Heart attack ([facility] [address]     History of echocardiogram 2014    Mild LVE. EF 40%. Mild, diffuse hypk. Mild LAE.   Mild MR.      Hypercholesteremia 14    Hypertension 2000    Noncompliance with medications 2016    Obesity     Orthostatic hypotension 2016    Osteoarthritis of both knees     Pain management 2016    Dr. Timmy Curling Popliteal cyst, bilateral      Vitamin D deficiency 10/16/14       Family History   Problem Relation Age of Onset    Diabetes Mother     Hypertension Mother     Hypertension Father     Kidney Disease Maternal Aunt 48        Dialysis       Social History     Tobacco Use    Smoking status: Former Smoker     Packs/day: 0.25     Years: 48.00     Pack years: 12.00     Types: Cigarettes     Last attempt to quit: 2018     Years since quittin.3    Smokeless tobacco: Never Used   Substance Use Topics    Alcohol use: No     Alcohol/week: 0.0 standard drinks    Drug use: No        Current Outpatient Medications   Medication Sig    albuterol (PROVENTIL HFA, VENTOLIN HFA, PROAIR HFA) 90 mcg/actuation inhaler Take 2 Puffs by inhalation every four (4) hours as needed for Wheezing.  carvedilol (COREG) 12.5 mg tablet Take 1 Tab by mouth two (2) times daily (with meals).  furosemide (LASIX) 40 mg tablet One tab 40 mg daily    sacubitril-valsartan (ENTRESTO) 97 mg/103 mg tablet Take 1 Tab by mouth two (2) times a day.  aspirin 81 mg chewable tablet Take 2 Tabs by mouth daily.  raNITIdine (ZANTAC) 150 mg tablet Take 1 Tab by mouth two (2) times a day.  atorvastatin (LIPITOR) 20 mg tablet Take 1 Tab by mouth nightly. (Patient taking differently: Take 20 mg by mouth two (2) times a day.)    insulin detemir (LEVEMIR) 100 unit/mL injection 15 units daily for diabetes    desloratadine (CLARINEX) 5 mg tablet Take 1 Tab by mouth daily as needed for Allergies.  mometasone (NASONEX) 50 mcg/actuation nasal spray 2 Sprays by Both Nostrils route daily as needed. For allergies    umeclidinium-vilanterol (ANORO ELLIPTA) 62.5-25 mcg/actuation inhaler Take 1 Puff by inhalation daily. For COPD    ferrous sulfate 325 mg (65 mg iron) tablet Take 1 Tab by mouth two (2) times a day. Indications: IRON DEFICIENCY ANEMIA     No current facility-administered medications for this visit. Past Surgical History:   Procedure Laterality Date    HX HEART CATHETERIZATION      HX TUBAL LIGATION         Visit Vitals  BP (!) 155/93   Pulse 90   Ht 5' 4\" (1.626 m)   Wt 105.2 kg (232 lb)   BMI 39.82 kg/m²       Diagnostic Studies:  I have reviewed the relevant tests done on the patient and show as follows  EKG tracings reviewed by me today. EKG Results     None        XR Results (most recent):  Results from Hospital Encounter encounter on 09/30/19   XR CHEST PORT    Narrative PORTABLE CHEST X-RAY    CPT CODE: 71268     INDICATION: Shortness of breath x3 days. COMPARISON: Plain film 8/25/2019. FINDINGS:   Heart size top normal, similar morphology to the prior exam. Atherosclerotic  calcifications in the aortic arch. No pneumothorax appreciated. No focal consolidation. Mildly prominent  interstitium perihilar and at the lower lobes suggesting vascular congestion. Apparent blunting of the lateral right costophrenic angle. Could reflect small  effusion, not well characterized on AP portable image. Impression IMPRESSION:    1. Findings suggest mild pulmonary vascular congestion with possible small  right-sided effusion. 08/07/19   ECHO ADULT COMPLETE 08/07/2019 8/7/2019    Narrative · Left Ventricle: Normal cavity size and wall thickness. Mild systolic   dysfunction. Estimated left ventricular ejection fraction is 46 - 50%. Abnormal left ventricular wall motion. Moderate (grade 2) left ventricular   diastolic dysfunction. · Left Atrium: Moderately dilated left atrium. · Aortic Valve: Aortic valve sclerosis. · Mitral Valve: Mitral valve thickening. Moderate to severe mitral valve   regurgitation. · Tricuspid Valve: Mild tricuspid valve regurgitation is present. Pulmonary arterial systolic pressure is 97.3 mmHg. Moderate pulmonary   hypertension. · Pulmonic Valve: Mild pulmonic valve regurgitation is present. · IVC/Hepatic Veins: Severely elevated central venous pressure (15+ mmHg);   IVC diameter is larger than 21 mm and collapses less than 50% with   respiration. Signed by: Vincent White MD       08/25/19   NUCLEAR CARDIAC STRESS TEST 08/28/2019 8/28/2019    Narrative · Baseline ECG: Normal sinus rhythm, occasional PACs. · Gated SPECT: Left ventricular function post-stress was abnormal.   Calculated ejection fraction is 44%. The TID ratio is 0.94. · Negative stress test.  · Left ventricular perfusion is abnormal.  · Myocardial perfusion imaging defect 1: There is a defect that is   moderate in size with a severe reduction in uptake present in the   mid-apical anterior and apex location(s) that is non-reversible. There is   abnormal wall motion in the defect area. Viability in the area is poor. The defect appears to be infarction.  Perfusion defect was visually present   without quantitative evidence. · Positive myocardial perfusion imaging. Myocardial perfusion imaging   supports an intermediate risk stress test.        Signed by: Nic Winkler MD            No flowsheet data found. Ms. Pablito Olivas has a reminder for a \"due or due soon\" health maintenance. I have asked that she contact her primary care provider for follow-up on this health maintenance. Physical Exam   Constitutional: She is oriented to person, place, and time. She appears well-developed and well-nourished. Neck: No JVD present. Cardiovascular: Normal rate, regular rhythm, normal heart sounds and intact distal pulses. Exam reveals no gallop and no friction rub. No murmur heard. Pulmonary/Chest: Effort normal and breath sounds normal. No respiratory distress. She has no wheezes. She has no rales. She exhibits no tenderness. Abdominal: Soft. She exhibits no distension and no mass. There is no tenderness. Musculoskeletal: Normal range of motion. She exhibits no edema. Neurological: She is alert and oriented to person, place, and time. Skin: Skin is warm and dry. Psychiatric: She has a normal mood and affect. ASSESSMENT and PLAN      HLD : Results for Jean-Claude Beck (MRN [id number]) as of 10/11/2019 12:54   Ref. Range 9/10/2019 00:00 9/30/2019 17:00 10/10/2019 00:00   Triglyceride Latest Ref Range: 0 - 149 mg/dL 67  101   Cholesterol, total Latest Ref Range: 100 - 199 mg/dL 168  152   HDL Cholesterol Latest Ref Range: >39 mg/dL 55  46   LDL, calculated Latest Ref Range: 0 - 99 mg/dL 100 (H)  86   VLDL, calculated Latest Ref Range: 5 - 40 mg/dL 13  20     Having more shortness of breath and increase of weight by almost 12 pounds. Likely has acute worsening of CHF. Increase Lasix to twice daily. Was seen in the emergency room but did not improve despite his course of steroids. Follow-up electrolytes. Reduce Coreg to 6.25 twice daily due to wheezing/shortness of breath. Will consider increasing in future once this CHF episode improves. Diet weight and exercise discussed and she is trying to follow. Diagnoses and all orders for this visit:    1. Acute on chronic systolic congestive heart failure (HCC)  -     furosemide (LASIX) 40 mg tablet; Take 1 Tab by mouth two (2) times a day. -     METABOLIC PANEL, BASIC; Future    2. Coronary artery disease involving native coronary artery of native heart without angina pectoris    3. Ischemic cardiomyopathy    4. Essential hypertension  -     carvedilol (COREG) 6.25 mg tablet; Take 1 Tab by mouth two (2) times daily (with meals). 5. Severe obesity (BMI 35.0-39. 9) with comorbidity [facility])        Pertinent laboratory and test data reviewed and discussed with patient.   See patient instructions also for other medical advice given    Medications Discontinued During This Encounter   Medication Reason    furosemide (LASIX) 40 mg tablet     carvedilol (COREG) 12.5 mg tablet        Follow-up and Dispositions    · Return in about 6 weeks (around 11/22/2019), or if symptoms worsen or fail to improve, for post test.

## 2019-10-11 NOTE — PATIENT INSTRUCTIONS
Medications Discontinued During This Encounter   Medication Reason    furosemide (LASIX) 40 mg tablet      After the recommended changes have been made in blood pressure medicines, patient advised to keep BP/HR(pulse rate) chart twice daily and bring us results in next 2 weeks or so. Patient may send the results via \"My Chart\" if desired. Please rest for 5-10 minutes before checking blood pressure       Avoiding Triggers With Heart Failure: Care Instructions  Your Care Instructions    Triggers are anything that make your heart failure flare up. A flare-up is also called \"sudden heart failure\" or \"acute heart failure. \" When you have a flare-up, fluid builds up in your lungs, and you have problems breathing. You might need to go to the hospital. By watching for changes in your condition and avoiding triggers, you can prevent heart failure flare-ups. Follow-up care is a key part of your treatment and safety. Be sure to make and go to all appointments, and call your doctor if you are having problems. It's also a good idea to know your test results and keep a list of the medicines you take. How can you care for yourself at home? Watch for changes in your weight and condition  · Weigh yourself without clothing at the same time each day. Record your weight. Call your doctor if you have sudden weight gain, such as more than 2 to 3 pounds in a day or 5 pounds in a week. (Your doctor may suggest a different range of weight gain.) A sudden weight gain may mean that your heart failure is getting worse. · Keep a daily record of your symptoms. Write down any changes in how you feel, such as new shortness of breath, cough, or problems eating. Also record if your ankles are more swollen than usual and if you feel more tired than usual. Note anything that you ate or did that could have triggered these changes. Limit sodium  Sodium causes your body to hold on to extra water.  This may cause your heart failure symptoms to get worse. People get most of their sodium from processed foods. Fast food and restaurant meals also tend to be very high in sodium. · Your doctor may suggest that you limit sodium to 2,000 milligrams (mg) a day or less. That is less than 1 teaspoon of salt a day, including all the salt you eat in cooking or in packaged foods. · Read food labels on cans and food packages. They tell you how much sodium you get in one serving. Check the serving size. If you eat more than one serving, you are getting more sodium. · Be aware that sodium can come in forms other than salt, including monosodium glutamate (MSG), sodium citrate, and sodium bicarbonate (baking soda). MSG is often added to Asian food. You can sometimes ask for food without MSG or salt. · Slowly reducing salt will help you adjust to the taste. Take the salt shaker off the table. · Flavor your food with garlic, lemon juice, onion, vinegar, herbs, and spices instead of salt. Do not use soy sauce, steak sauce, onion salt, garlic salt, mustard, or ketchup on your food, unless it is labeled \"low-sodium\" or \"low-salt. \"  · Make your own salad dressings, sauces, and ketchup without adding salt. · Use fresh or frozen ingredients, instead of canned ones, whenever you can. Choose low-sodium canned goods. · Eat less processed food and food from restaurants, including fast food. Exercise as directed  Moderate, regular exercise is very good for your heart. It improves your blood flow and helps control your weight. But too much exercise can stress your heart and cause a heart failure flare-up. · Check with your doctor before you start an exercise program.  · Walking is an easy way to get exercise. Start out slowly. Gradually increase the length and pace of your walk. Swimming, riding a bike, and using a treadmill are also good forms of exercise. · When you exercise, watch for signs that your heart is working too hard.  You are pushing yourself too hard if you cannot talk while you are exercising. If you become short of breath or dizzy or have chest pain, stop, sit down, and rest.  · Do not exercise when you do not feel well. Take medicines correctly  · Take your medicines exactly as prescribed. Call your doctor if you think you are having a problem with your medicine. · Make a list of all the medicines you take. Include those prescribed to you by other doctors and any over-the-counter medicines, vitamins, or supplements you take. Take this list with you when you go to any doctor. · Take your medicines at the same time every day. It may help you to post a list of all the medicines you take every day and what time of day you take them. · Make taking your medicine as simple as you can. Plan times to take your medicines when you are doing other things, such as eating a meal or getting ready for bed. This will make it easier to remember to take your medicines. · Get organized. Use helpful tools, such as daily or weekly pill containers. When should you call for help? Call 911 if you have symptoms of sudden heart failure such as:    · You have severe trouble breathing.     · You cough up pink, foamy mucus.     · You have a new irregular or rapid heartbeat.    Call your doctor now or seek immediate medical care if:    · You have new or increased shortness of breath.     · You are dizzy or lightheaded, or you feel like you may faint.     · You have sudden weight gain, such as more than 2 to 3 pounds in a day or 5 pounds in a week. (Your doctor may suggest a different range of weight gain.)     · You have increased swelling in your legs, ankles, or feet.     · You are suddenly so tired or weak that you cannot do your usual activities.    Watch closely for changes in your health, and be sure to contact your doctor if you develop new symptoms. Where can you learn more? Go to http://jason-charity.info/.   Enter Q555 in the search box to learn more about \"Avoiding Triggers With Heart Failure: Care Instructions. \"  Current as of: April 9, 2019  Content Version: 12.2  © 3436-1859 [employer]. Care instructions adapted under license by [employer] (which disclaims liability or warranty for this information). If you have questions about a medical condition or this instruction, always ask your healthcare professional. [address] any warranty or liability for your use of this information.

## 2019-10-11 NOTE — PROGRESS NOTES
1. Have you been to the ER, urgent care clinic since your last visit? Hospitalized since your last visit? Yes When: 9/2019 Where: [facility] Reason for visit: shortness of breath    2. Have you seen or consulted any other health care providers outside of the [address] [place] [address] since your last visit? Include any pap smears or colon screening. No     3. Since your last visit, have you had any of the following symptoms? shortness of breath and swelling in legs/arms. 4.  Have you had any blood work, X-rays or cardiac testing? Yes Where: [facility]       5. Where do you normally have your labs drawn? [facility]    6. Do you need any refills today?    no

## 2019-10-14 ENCOUNTER — HOSPITAL ENCOUNTER (OUTPATIENT)
Dept: LAB | Age: 69
Discharge: HOME | End: 2019-10-14
Payer: MEDICARE

## 2019-10-14 DIAGNOSIS — I50.23 ACUTE ON CHRONIC SYSTOLIC CONGESTIVE HEART FAILURE (HCC): ICD-10-CM

## 2019-10-14 LAB
ANION GAP SERPL CALC-SCNC: 7 MMOL/L (ref 3–18)
BUN SERPL-MCNC: 21 MG/DL (ref 7–18)
BUN/CREAT SERPL: 11 (ref 12–20)
CALCIUM SERPL-MCNC: 8.6 MG/DL (ref 8.5–10.1)
CHLORIDE SERPL-SCNC: 108 MMOL/L (ref 100–111)
CO2 SERPL-SCNC: 27 MMOL/L (ref 21–32)
CREAT SERPL-MCNC: 1.99 MG/DL (ref 0.6–1.3)
GLUCOSE SERPL-MCNC: 180 MG/DL (ref 74–99)
POTASSIUM SERPL-SCNC: 4.3 MMOL/L (ref 3.5–5.5)
SODIUM SERPL-SCNC: 142 MMOL/L (ref 136–145)

## 2019-10-14 PROCEDURE — 36415 COLL VENOUS BLD VENIPUNCTURE: CPT

## 2019-10-14 PROCEDURE — 80048 BASIC METABOLIC PNL TOTAL CA: CPT

## 2019-10-18 ENCOUNTER — APPOINTMENT (OUTPATIENT)
Dept: ULTRASOUND IMAGING | Age: 69
DRG: 208 | End: 2019-10-18
Attending: EMERGENCY MEDICINE
Payer: MEDICARE

## 2019-10-18 ENCOUNTER — HOSPITAL ENCOUNTER (INPATIENT)
Age: 69
LOS: 9 days | Discharge: HOME | DRG: 208 | End: 2019-10-27
Attending: EMERGENCY MEDICINE | Admitting: INTERNAL MEDICINE
Payer: MEDICARE

## 2019-10-18 ENCOUNTER — APPOINTMENT (OUTPATIENT)
Dept: GENERAL RADIOLOGY | Age: 69
DRG: 208 | End: 2019-10-18
Attending: EMERGENCY MEDICINE
Payer: MEDICARE

## 2019-10-18 ENCOUNTER — APPOINTMENT (OUTPATIENT)
Dept: GENERAL RADIOLOGY | Age: 69
DRG: 208 | End: 2019-10-18
Attending: PHYSICIAN ASSISTANT
Payer: MEDICARE

## 2019-10-18 ENCOUNTER — APPOINTMENT (OUTPATIENT)
Dept: CT IMAGING | Age: 69
DRG: 208 | End: 2019-10-18
Attending: PHYSICIAN ASSISTANT
Payer: MEDICARE

## 2019-10-18 ENCOUNTER — APPOINTMENT (OUTPATIENT)
Dept: CT IMAGING | Age: 69
DRG: 208 | End: 2019-10-18
Attending: EMERGENCY MEDICINE
Payer: MEDICARE

## 2019-10-18 DIAGNOSIS — I25.10 CAD (CORONARY ARTERY DISEASE): ICD-10-CM

## 2019-10-18 DIAGNOSIS — R07.82 INTERCOSTAL PAIN: ICD-10-CM

## 2019-10-18 DIAGNOSIS — R55 SYNCOPE AND COLLAPSE: ICD-10-CM

## 2019-10-18 DIAGNOSIS — G89.4 CHRONIC PAIN SYNDROME: ICD-10-CM

## 2019-10-18 DIAGNOSIS — J44.1 COPD EXACERBATION (HCC): ICD-10-CM

## 2019-10-18 DIAGNOSIS — J96.01 ACUTE RESPIRATORY FAILURE WITH HYPOXIA (HCC): Primary | ICD-10-CM

## 2019-10-18 DIAGNOSIS — S92.351A DISPLACED FRACTURE OF FIFTH METATARSAL BONE, RIGHT FOOT, INITIAL ENCOUNTER FOR CLOSED FRACTURE: ICD-10-CM

## 2019-10-18 PROBLEM — J81.0 FLASH PULMONARY EDEMA (HCC): Status: ACTIVE | Noted: 2019-10-18

## 2019-10-18 PROBLEM — R79.89 ELEVATED D-DIMER: Status: ACTIVE | Noted: 2019-10-18

## 2019-10-18 PROBLEM — J96.21 ACUTE ON CHRONIC RESPIRATORY FAILURE WITH HYPOXIA (HCC): Status: ACTIVE | Noted: 2019-10-18

## 2019-10-18 LAB
ALBUMIN SERPL-MCNC: 3.2 G/DL (ref 3.4–5)
ALBUMIN/GLOB SERPL: 0.9 {RATIO} (ref 0.8–1.7)
ALP SERPL-CCNC: 130 U/L (ref 45–117)
ALT SERPL-CCNC: 13 U/L (ref 13–56)
ANION GAP SERPL CALC-SCNC: 6 MMOL/L (ref 3–18)
APPEARANCE UR: CLEAR
AST SERPL-CCNC: 8 U/L (ref 10–38)
BACTERIA URNS QL MICRO: ABNORMAL /HPF
BASOPHILS # BLD: 0 K/UL (ref 0–0.1)
BASOPHILS NFR BLD: 0 % (ref 0–2)
BILIRUB SERPL-MCNC: 0.5 MG/DL (ref 0.2–1)
BILIRUB UR QL: NEGATIVE
BNP SERPL-MCNC: 1645 PG/ML (ref 0–900)
BUN SERPL-MCNC: 21 MG/DL (ref 7–18)
BUN/CREAT SERPL: 14 (ref 12–20)
CALCIUM SERPL-MCNC: 9.1 MG/DL (ref 8.5–10.1)
CHLORIDE SERPL-SCNC: 113 MMOL/L (ref 100–111)
CK MB CFR SERPL CALC: NORMAL % (ref 0–4)
CK MB SERPL-MCNC: <1 NG/ML (ref 5–25)
CK SERPL-CCNC: 60 U/L (ref 26–192)
CO2 SERPL-SCNC: 24 MMOL/L (ref 21–32)
COLOR UR: YELLOW
CREAT SERPL-MCNC: 1.49 MG/DL (ref 0.6–1.3)
D DIMER PPP FEU-MCNC: 1.09 UG/ML(FEU)
DIFFERENTIAL METHOD BLD: ABNORMAL
EOSINOPHIL # BLD: 0.2 K/UL (ref 0–0.4)
EOSINOPHIL NFR BLD: 2 % (ref 0–5)
EPITH CASTS URNS QL MICRO: ABNORMAL /LPF (ref 0–5)
ERYTHROCYTE [DISTWIDTH] IN BLOOD BY AUTOMATED COUNT: 17.8 % (ref 11.6–14.5)
GLOBULIN SER CALC-MCNC: 3.7 G/DL (ref 2–4)
GLUCOSE SERPL-MCNC: 141 MG/DL (ref 74–99)
GLUCOSE UR STRIP.AUTO-MCNC: NEGATIVE MG/DL
HCT VFR BLD AUTO: 33.6 % (ref 35–45)
HGB BLD-MCNC: 9.8 G/DL (ref 12–16)
HGB UR QL STRIP: NEGATIVE
KETONES UR QL STRIP.AUTO: NEGATIVE MG/DL
LEUKOCYTE ESTERASE UR QL STRIP.AUTO: NEGATIVE
LYMPHOCYTES # BLD: 2.1 K/UL (ref 0.9–3.6)
LYMPHOCYTES NFR BLD: 32 % (ref 21–52)
MCH RBC QN AUTO: 25.2 PG (ref 24–34)
MCHC RBC AUTO-ENTMCNC: 29.2 G/DL (ref 31–37)
MCV RBC AUTO: 86.4 FL (ref 74–97)
MONOCYTES # BLD: 0.5 K/UL (ref 0.05–1.2)
MONOCYTES NFR BLD: 8 % (ref 3–10)
NEUTS SEG # BLD: 3.9 K/UL (ref 1.8–8)
NEUTS SEG NFR BLD: 58 % (ref 40–73)
NITRITE UR QL STRIP.AUTO: NEGATIVE
PH UR STRIP: 5.5 [PH] (ref 5–8)
PLATELET # BLD AUTO: 295 K/UL (ref 135–420)
PMV BLD AUTO: 9.8 FL (ref 9.2–11.8)
POTASSIUM SERPL-SCNC: 4.1 MMOL/L (ref 3.5–5.5)
PROT SERPL-MCNC: 6.9 G/DL (ref 6.4–8.2)
PROT UR STRIP-MCNC: 100 MG/DL
RBC # BLD AUTO: 3.89 M/UL (ref 4.2–5.3)
SODIUM SERPL-SCNC: 143 MMOL/L (ref 136–145)
SP GR UR REFRACTOMETRY: 1.02 (ref 1–1.03)
TROPONIN I SERPL-MCNC: <0.02 NG/ML (ref 0–0.04)
UROBILINOGEN UR QL STRIP.AUTO: 1 EU/DL (ref 0.2–1)
WBC # BLD AUTO: 6.6 K/UL (ref 4.6–13.2)
WBC URNS QL MICRO: ABNORMAL /HPF (ref 0–4)

## 2019-10-18 PROCEDURE — 74011000250 HC RX REV CODE- 250

## 2019-10-18 PROCEDURE — 74011250636 HC RX REV CODE- 250/636

## 2019-10-18 PROCEDURE — 71275 CT ANGIOGRAPHY CHEST: CPT

## 2019-10-18 PROCEDURE — 84145 PROCALCITONIN (PCT): CPT

## 2019-10-18 PROCEDURE — 74011000250 HC RX REV CODE- 250: Performed by: INTERNAL MEDICINE

## 2019-10-18 PROCEDURE — 96368 THER/DIAG CONCURRENT INF: CPT

## 2019-10-18 PROCEDURE — 81001 URINALYSIS AUTO W/SCOPE: CPT

## 2019-10-18 PROCEDURE — 74011250636 HC RX REV CODE- 250/636: Performed by: EMERGENCY MEDICINE

## 2019-10-18 PROCEDURE — 74011636320 HC RX REV CODE- 636/320: Performed by: EMERGENCY MEDICINE

## 2019-10-18 PROCEDURE — 96367 TX/PROPH/DG ADDL SEQ IV INF: CPT

## 2019-10-18 PROCEDURE — 83880 ASSAY OF NATRIURETIC PEPTIDE: CPT

## 2019-10-18 PROCEDURE — 85379 FIBRIN DEGRADATION QUANT: CPT

## 2019-10-18 PROCEDURE — 71046 X-RAY EXAM CHEST 2 VIEWS: CPT

## 2019-10-18 PROCEDURE — 87449 NOS EACH ORGANISM AG IA: CPT

## 2019-10-18 PROCEDURE — 94762 N-INVAS EAR/PLS OXIMTRY CONT: CPT

## 2019-10-18 PROCEDURE — 93005 ELECTROCARDIOGRAM TRACING: CPT

## 2019-10-18 PROCEDURE — 43753 TX GASTRO INTUB W/ASP: CPT

## 2019-10-18 PROCEDURE — 85025 COMPLETE CBC W/AUTO DIFF WBC: CPT

## 2019-10-18 PROCEDURE — 99285 EMERGENCY DEPT VISIT HI MDM: CPT

## 2019-10-18 PROCEDURE — 65610000006 HC RM INTENSIVE CARE

## 2019-10-18 PROCEDURE — 80053 COMPREHEN METABOLIC PANEL: CPT

## 2019-10-18 PROCEDURE — 71045 X-RAY EXAM CHEST 1 VIEW: CPT

## 2019-10-18 PROCEDURE — 96375 TX/PRO/DX INJ NEW DRUG ADDON: CPT

## 2019-10-18 PROCEDURE — 74011250636 HC RX REV CODE- 250/636: Performed by: INTERNAL MEDICINE

## 2019-10-18 PROCEDURE — 74011000250 HC RX REV CODE- 250: Performed by: EMERGENCY MEDICINE

## 2019-10-18 PROCEDURE — 74011250636 HC RX REV CODE- 250/636: Performed by: PHYSICIAN ASSISTANT

## 2019-10-18 PROCEDURE — 70450 CT HEAD/BRAIN W/O DYE: CPT

## 2019-10-18 PROCEDURE — 82550 ASSAY OF CK (CPK): CPT

## 2019-10-18 PROCEDURE — 73630 X-RAY EXAM OF FOOT: CPT

## 2019-10-18 PROCEDURE — 51702 INSERT TEMP BLADDER CATH: CPT

## 2019-10-18 PROCEDURE — 74011000250 HC RX REV CODE- 250: Performed by: PHYSICIAN ASSISTANT

## 2019-10-18 PROCEDURE — 87450 LEGIONELLA PNEUMOPHILA AG, URINE: CPT

## 2019-10-18 PROCEDURE — 96374 THER/PROPH/DIAG INJ IV PUSH: CPT

## 2019-10-18 PROCEDURE — 74011000258 HC RX REV CODE- 258: Performed by: EMERGENCY MEDICINE

## 2019-10-18 PROCEDURE — 96365 THER/PROPH/DIAG IV INF INIT: CPT

## 2019-10-18 RX ORDER — PROPOFOL 10 MG/ML
0-50 VIAL (ML) INTRAVENOUS
Status: DISCONTINUED | OUTPATIENT
Start: 2019-10-18 | End: 2019-10-19

## 2019-10-18 RX ORDER — MIDAZOLAM HYDROCHLORIDE 1 MG/ML
INJECTION, SOLUTION INTRAMUSCULAR; INTRAVENOUS
Status: COMPLETED
Start: 2019-10-18 | End: 2019-10-18

## 2019-10-18 RX ORDER — FUROSEMIDE 10 MG/ML
INJECTION INTRAMUSCULAR; INTRAVENOUS
Status: COMPLETED
Start: 2019-10-18 | End: 2019-10-18

## 2019-10-18 RX ORDER — SODIUM CHLORIDE 9 MG/ML
1000 INJECTION, SOLUTION INTRAVENOUS ONCE
Status: COMPLETED | OUTPATIENT
Start: 2019-10-18 | End: 2019-10-19

## 2019-10-18 RX ORDER — MIDAZOLAM HYDROCHLORIDE 1 MG/ML
4 INJECTION, SOLUTION INTRAMUSCULAR; INTRAVENOUS ONCE
Status: COMPLETED | OUTPATIENT
Start: 2019-10-18 | End: 2019-10-18

## 2019-10-18 RX ORDER — ALBUTEROL SULFATE 0.83 MG/ML
SOLUTION RESPIRATORY (INHALATION)
Status: COMPLETED
Start: 2019-10-18 | End: 2019-10-18

## 2019-10-18 RX ORDER — PROPOFOL 10 MG/ML
INJECTION, EMULSION INTRAVENOUS
Status: COMPLETED
Start: 2019-10-18 | End: 2019-10-18

## 2019-10-18 RX ORDER — IPRATROPIUM BROMIDE AND ALBUTEROL SULFATE 2.5; .5 MG/3ML; MG/3ML
3 SOLUTION RESPIRATORY (INHALATION)
Status: COMPLETED | OUTPATIENT
Start: 2019-10-18 | End: 2019-10-18

## 2019-10-18 RX ORDER — MIDAZOLAM HYDROCHLORIDE 1 MG/ML
3 INJECTION, SOLUTION INTRAMUSCULAR; INTRAVENOUS ONCE
Status: COMPLETED | OUTPATIENT
Start: 2019-10-18 | End: 2019-10-18

## 2019-10-18 RX ORDER — ALBUTEROL SULFATE 0.83 MG/ML
2.5 SOLUTION RESPIRATORY (INHALATION)
Status: COMPLETED | OUTPATIENT
Start: 2019-10-18 | End: 2019-10-18

## 2019-10-18 RX ORDER — FUROSEMIDE 10 MG/ML
40 INJECTION INTRAMUSCULAR; INTRAVENOUS
Status: COMPLETED | OUTPATIENT
Start: 2019-10-18 | End: 2019-10-18

## 2019-10-18 RX ORDER — MIDAZOLAM HYDROCHLORIDE 1 MG/ML
1 INJECTION, SOLUTION INTRAMUSCULAR; INTRAVENOUS ONCE
Status: COMPLETED | OUTPATIENT
Start: 2019-10-18 | End: 2019-10-18

## 2019-10-18 RX ORDER — MAGNESIUM SULFATE 1 G/100ML
1 INJECTION INTRAVENOUS ONCE
Status: COMPLETED | OUTPATIENT
Start: 2019-10-18 | End: 2019-10-18

## 2019-10-18 RX ADMIN — IOPAMIDOL 72 ML: 755 INJECTION, SOLUTION INTRAVENOUS at 21:45

## 2019-10-18 RX ADMIN — AZITHROMYCIN MONOHYDRATE 500 MG: 500 INJECTION, POWDER, LYOPHILIZED, FOR SOLUTION INTRAVENOUS at 22:29

## 2019-10-18 RX ADMIN — WATER 2 G: 1 INJECTION INTRAMUSCULAR; INTRAVENOUS; SUBCUTANEOUS at 22:26

## 2019-10-18 RX ADMIN — MIDAZOLAM 4 MG: 1 INJECTION INTRAMUSCULAR; INTRAVENOUS at 22:44

## 2019-10-18 RX ADMIN — MIDAZOLAM 1 MG: 1 INJECTION INTRAMUSCULAR; INTRAVENOUS at 21:40

## 2019-10-18 RX ADMIN — SODIUM CHLORIDE 0.4 MG/KG/HR: 9 INJECTION, SOLUTION INTRAVENOUS at 22:49

## 2019-10-18 RX ADMIN — SODIUM CHLORIDE 1000 ML: 900 INJECTION, SOLUTION INTRAVENOUS at 18:26

## 2019-10-18 RX ADMIN — FUROSEMIDE 40 MG: 10 INJECTION INTRAMUSCULAR; INTRAVENOUS at 21:01

## 2019-10-18 RX ADMIN — METHYLPREDNISOLONE SODIUM SUCCINATE 125 MG: 125 INJECTION, POWDER, FOR SOLUTION INTRAMUSCULAR; INTRAVENOUS at 20:08

## 2019-10-18 RX ADMIN — MIDAZOLAM 2 MG: 1 INJECTION INTRAMUSCULAR; INTRAVENOUS at 20:59

## 2019-10-18 RX ADMIN — PROPOFOL 5 MCG/KG/MIN: 10 INJECTION, EMULSION INTRAVENOUS at 21:05

## 2019-10-18 RX ADMIN — MAGNESIUM SULFATE 1 G: 1 INJECTION INTRAVENOUS at 20:08

## 2019-10-18 RX ADMIN — ALBUTEROL SULFATE 2.5 MG: 0.83 SOLUTION RESPIRATORY (INHALATION) at 20:34

## 2019-10-18 RX ADMIN — IPRATROPIUM BROMIDE AND ALBUTEROL SULFATE 3 ML: .5; 3 SOLUTION RESPIRATORY (INHALATION) at 20:08

## 2019-10-18 RX ADMIN — Medication 5 MCG/KG/MIN: at 21:05

## 2019-10-18 RX ADMIN — SODIUM CHLORIDE 0.05 MG/KG/HR: 9 INJECTION, SOLUTION INTRAVENOUS at 22:27

## 2019-10-18 RX ADMIN — IPRATROPIUM BROMIDE AND ALBUTEROL SULFATE 3 ML: .5; 3 SOLUTION RESPIRATORY (INHALATION) at 18:26

## 2019-10-18 RX ADMIN — MIDAZOLAM HYDROCHLORIDE 2 MG/HR: 5 INJECTION, SOLUTION INTRAMUSCULAR; INTRAVENOUS at 21:45

## 2019-10-18 RX ADMIN — FUROSEMIDE 40 MG: 10 INJECTION, SOLUTION INTRAMUSCULAR; INTRAVENOUS at 21:01

## 2019-10-18 RX ADMIN — SODIUM CHLORIDE 1000 ML: 900 INJECTION, SOLUTION INTRAVENOUS at 22:44

## 2019-10-18 RX ADMIN — ALBUTEROL SULFATE 2.5 MG: 2.5 SOLUTION RESPIRATORY (INHALATION) at 20:34

## 2019-10-18 RX ADMIN — MIDAZOLAM 3 MG: 1 INJECTION INTRAMUSCULAR; INTRAVENOUS at 20:47

## 2019-10-18 RX ADMIN — MIDAZOLAM HYDROCHLORIDE 2 MG: 1 INJECTION, SOLUTION INTRAMUSCULAR; INTRAVENOUS at 20:59

## 2019-10-18 NOTE — Clinical Note
Contrast Dose Calculator:  
Patient's age: 81.  
Patient's sex: Female. Patient weight (kg) = 98.2. Creatinine level (mg/dL) = 1.59. Creatinine clearance (mL/min): 53.  
Contrast concentration (mg/mL) = 300. MACD = 300 mL. Max Contrast dose per Creatinine Cl calculator = 119.25 mL.

## 2019-10-18 NOTE — Clinical Note
Right groin and right radial clipped, prepped with ChloraPrep and draped. Wet prep solution applied at: 832. Wet prep solution dried at: 835. Wet prep elapsed drying time: 3 mins.

## 2019-10-18 NOTE — ED PROVIDER NOTES
EMERGENCY DEPARTMENT HISTORY AND PHYSICAL EXAM    5:53 PM      Date: 10/18/2019  Patient Name: John Perry    History of Presenting Illness     Chief Complaint   Patient presents with    Shortness of Breath         History Provided By: Patient    Additional History (Context): John Perry is a 81 y.o. female with CAD, cardiomyopathy, diabetes, hypertension, hyperlipidemia, obesity, renal insufficiency and COPD who presents with complaints of shortness of breath x1 week. She states that intermittently the shortness of breath causes her dizziness. She reports a syncopal episode 2 days ago, which her significant other states there was no head trauma. The patient complains during the syncopal episode she hurt her right foot and has had difficulty ambulating since then. The patient states she has been taking her usual inhaler with relief of her shortness of breath. She denies any chest pain. PCP: Robbie Knox MD    Current Facility-Administered Medications   Medication Dose Route Frequency Provider Last Rate Last Dose    albuterol (PROVENTIL VENTOLIN) nebulizer solution 2.5 mg  2.5 mg Nebulization NOW Gemma Ortiz MD        albuterol (PROVENTIL VENTOLIN) 2.5 mg /3 mL (0.083 %) nebulizer solution             propofol (DIPRIVAN) infusion  0-50 mcg/kg/min IntraVENous TITRATE Gemma Ortiz MD   Stopped at 10/18/19 2200    fentaNYL citrate (PF) 10 mcg/mL in 0.9% sodium chloride 50 mL infusion  1 mcg/kg/hr IntraVENous CONTINUOUS Gemma Ortiz MD         Current Outpatient Medications   Medication Sig Dispense Refill    tizanidine HCl (TIZANIDINE PO) Take  by mouth.  furosemide (LASIX) 40 mg tablet Take 1 Tab by mouth two (2) times a day. 30 Tab 0    carvedilol (COREG) 6.25 mg tablet Take 1 Tab by mouth two (2) times daily (with meals).  60 Tab 2    albuterol (PROVENTIL HFA, VENTOLIN HFA, PROAIR HFA) 90 mcg/actuation inhaler Take 2 Puffs by inhalation every four (4) hours as needed for Wheezing. 1 Inhaler 0    sacubitril-valsartan (ENTRESTO) 97 mg/103 mg tablet Take 1 Tab by mouth two (2) times a day. 60 Tab 6    aspirin 81 mg chewable tablet Take 2 Tabs by mouth daily. 60 Tab 0    raNITIdine (ZANTAC) 150 mg tablet Take 1 Tab by mouth two (2) times a day. 60 Tab 4    atorvastatin (LIPITOR) 20 mg tablet Take 1 Tab by mouth nightly. (Patient taking differently: Take 20 mg by mouth two (2) times a day.) 30 Tab 0    insulin detemir (LEVEMIR) 100 unit/mL injection 15 units daily for diabetes 2 Vial 0    desloratadine (CLARINEX) 5 mg tablet Take 1 Tab by mouth daily as needed for Allergies. 30 Tab 0    mometasone (NASONEX) 50 mcg/actuation nasal spray 2 Sprays by Both Nostrils route daily as needed. For allergies 3 Container 3    umeclidinium-vilanterol (ANORO ELLIPTA) 62.5-25 mcg/actuation inhaler Take 1 Puff by inhalation daily. For COPD 3 Inhaler 3    ferrous sulfate 325 mg (65 mg iron) tablet Take 1 Tab by mouth two (2) times a day. Indications: IRON DEFICIENCY ANEMIA 60 Tab 11       Past History     Past Medical History:  Past Medical History:   Diagnosis Date    Abnormal WBC count 5/17/2016    Bilateral shoulder pain 9/27/2016    Chondromalacia of both patellae     COPD (chronic obstructive pulmonary disease) ([facility] [address] 9/2015    mild-mod dz; Dr Amalia Reese    Diabetes [facility]) 2013    Diabetic eye exam ([facility] [address] 2016    Dilated cardiomyopathy ([facility] [address]     Dyslipidemia     Gout     Heart attack ([facility] [address]     Heart attack ([facility] [address]     History of echocardiogram 11/14/2014    Mild LVE. EF 40%. Mild, diffuse hypk. Mild LAE.   Mild MR.      Hypercholesteremia 1/08/14    Hypertension 2000    Noncompliance with medications 2/16/2016    Obesity     Orthostatic hypotension 5/17/2016    Osteoarthritis of both knees     Pain management 04/01/2016    Dr. Khan Och Popliteal cyst, bilateral      Vitamin D deficiency 10/16/14       Past Surgical History:  Past Surgical History:   Procedure Laterality Date    HX HEART CATHETERIZATION      HX TUBAL LIGATION         Family History:  Family History   Problem Relation Age of Onset    Diabetes Mother     Hypertension Mother     Hypertension Father     Kidney Disease Maternal Aunt 48        Dialysis       Social History:  Social History     Tobacco Use    Smoking status: Former Smoker     Packs/day: 0.25     Years: 48.00     Pack years: 12.00     Types: Cigarettes     Last attempt to quit: 2018     Years since quittin.4    Smokeless tobacco: Never Used   Substance Use Topics    Alcohol use: No     Alcohol/week: 0.0 standard drinks    Drug use: No       Allergies:  No Known Allergies      Review of Systems       Review of Systems   Constitutional: Negative for activity change, appetite change, chills, diaphoresis, fatigue and fever. HENT: Negative for congestion, rhinorrhea, sinus pressure, sinus pain and sore throat. Respiratory: Positive for cough, shortness of breath and wheezing. Negative for chest tightness. Cardiovascular: Negative for chest pain and palpitations. Gastrointestinal: Negative for abdominal pain, constipation, diarrhea, nausea and vomiting. Genitourinary: Positive for decreased urine volume. Negative for dysuria, frequency, hematuria and urgency. Musculoskeletal: Positive for arthralgias and gait problem. Neurological: Positive for dizziness, syncope and light-headedness. Negative for seizures, speech difficulty, weakness, numbness and headaches. Physical Exam     Visit Vitals  BP (!) 81/52   Pulse 82   Temp 98.1 °F (36.7 °C)   Resp 14   Ht 5' 4\" (1.626 m)   Wt 101.2 kg (223 lb)   SpO2 100%   BMI 38.28 kg/m²         Physical Exam   Constitutional: She is oriented to person, place, and time. No distress. Morbidly obese AA female. HENT:   Head: Normocephalic and atraumatic. Nose: Nose normal.   Eyes: EOM are normal. No scleral icterus. Neck: Neck supple.    Cardiovascular: Normal rate and regular rhythm. Exam reveals no gallop and no friction rub. No murmur heard. Pulmonary/Chest: Effort normal. No respiratory distress. She has decreased breath sounds in the right lower field and the left lower field. She has rales in the left lower field. Abdominal: Soft. Bowel sounds are normal. She exhibits no distension. There is no tenderness. There is no rebound and no guarding. Musculoskeletal: Normal range of motion. Feet:    Tenderness to palpation of the dorsal aspect of the right foot. Full passive and active range of motion. Neurological: She is alert and oriented to person, place, and time. She has normal strength. No cranial nerve deficit or sensory deficit. Skin: Skin is warm and dry. She is not diaphoretic. Diagnostic Study Results     Labs -  Recent Results (from the past 12 hour(s))   CBC WITH AUTOMATED DIFF    Collection Time: 10/18/19  5:15 PM   Result Value Ref Range    WBC 6.6 4.6 - 13.2 K/uL    RBC 3.89 (L) 4.20 - 5.30 M/uL    HGB 9.8 (L) 12.0 - 16.0 g/dL    HCT 33.6 (L) 35.0 - 45.0 %    MCV 86.4 74.0 - 97.0 FL    MCH 25.2 24.0 - 34.0 PG    MCHC 29.2 (L) 31.0 - 37.0 g/dL    RDW 17.8 (H) 11.6 - 14.5 %    PLATELET 957 440 - 826 K/uL    MPV 9.8 9.2 - 11.8 FL    NEUTROPHILS 58 40 - 73 %    LYMPHOCYTES 32 21 - 52 %    MONOCYTES 8 3 - 10 %    EOSINOPHILS 2 0 - 5 %    BASOPHILS 0 0 - 2 %    ABS. NEUTROPHILS 3.9 1.8 - 8.0 K/UL    ABS. LYMPHOCYTES 2.1 0.9 - 3.6 K/UL    ABS. MONOCYTES 0.5 0.05 - 1.2 K/UL    ABS. EOSINOPHILS 0.2 0.0 - 0.4 K/UL    ABS.  BASOPHILS 0.0 0.0 - 0.1 K/UL    DF AUTOMATED     METABOLIC PANEL, COMPREHENSIVE    Collection Time: 10/18/19  5:15 PM   Result Value Ref Range    Sodium 143 136 - 145 mmol/L    Potassium 4.1 3.5 - 5.5 mmol/L    Chloride 113 (H) 100 - 111 mmol/L    CO2 24 21 - 32 mmol/L    Anion gap 6 3.0 - 18 mmol/L    Glucose 141 (H) 74 - 99 mg/dL    BUN 21 (H) 7.0 - 18 MG/DL    Creatinine 1.49 (H) 0.6 - 1.3 MG/DL    BUN/Creatinine ratio 14 12 - 20 GFR est AA 42 (L) >60 ml/min/1.73m2    GFR est non-AA 35 (L) >60 ml/min/1.73m2    Calcium 9.1 8.5 - 10.1 MG/DL    Bilirubin, total 0.5 0.2 - 1.0 MG/DL    ALT (SGPT) 13 13 - 56 U/L    AST (SGOT) 8 (L) 10 - 38 U/L    Alk.  phosphatase 130 (H) 45 - 117 U/L    Protein, total 6.9 6.4 - 8.2 g/dL    Albumin 3.2 (L) 3.4 - 5.0 g/dL    Globulin 3.7 2.0 - 4.0 g/dL    A-G Ratio 0.9 0.8 - 1.7     CARDIAC PANEL,(CK, CKMB & TROPONIN)    Collection Time: 10/18/19  5:15 PM   Result Value Ref Range    CK 60 26 - 192 U/L    CK - MB <1.0 <3.6 ng/ml    CK-MB Index  0.0 - 4.0 %     CALCULATION NOT PERFORMED WHEN RESULT IS BELOW LINEAR LIMIT    Troponin-I, QT <0.02 0.0 - 0.045 NG/ML   D DIMER    Collection Time: 10/18/19  5:15 PM   Result Value Ref Range    D DIMER 1.09 (H) <0.46 ug/ml(FEU)   EKG, 12 LEAD, INITIAL    Collection Time: 10/18/19  5:16 PM   Result Value Ref Range    Ventricular Rate 92 BPM    Atrial Rate 92 BPM    P-R Interval 154 ms    QRS Duration 76 ms    Q-T Interval 372 ms    QTC Calculation (Bezet) 460 ms    Calculated P Axis 57 degrees    Calculated R Axis 12 degrees    Calculated T Axis 77 degrees    Diagnosis       Normal sinus rhythm  Normal ECG  When compared with ECG of 30-SEP-2019 16:58,  No significant change was found     URINALYSIS W/ RFLX MICROSCOPIC    Collection Time: 10/18/19  7:34 PM   Result Value Ref Range    Color YELLOW      Appearance CLEAR      Specific gravity 1.018 1.005 - 1.030      pH (UA) 5.5 5.0 - 8.0      Protein 100 (A) NEG mg/dL    Glucose NEGATIVE  NEG mg/dL    Ketone NEGATIVE  NEG mg/dL    Bilirubin NEGATIVE  NEG      Blood NEGATIVE  NEG      Urobilinogen 1.0 0.2 - 1.0 EU/dL    Nitrites NEGATIVE  NEG      Leukocyte Esterase NEGATIVE  NEG     URINE MICROSCOPIC ONLY    Collection Time: 10/18/19  7:34 PM   Result Value Ref Range    WBC 0 to 3 0 - 4 /hpf    Epithelial cells 3+ 0 - 5 /lpf    Bacteria 2+ (A) NEG /hpf   EKG, 12 LEAD, SUBSEQUENT    Collection Time: 10/18/19  9:01 PM   Result Value Ref Range    Ventricular Rate 103 BPM    Atrial Rate 103 BPM    P-R Interval 148 ms    QRS Duration 80 ms    Q-T Interval 372 ms    QTC Calculation (Bezet) 487 ms    Calculated P Axis 77 degrees    Calculated R Axis 66 degrees    Calculated T Axis 88 degrees    Diagnosis       Sinus tachycardia  Otherwise normal ECG  When compared with ECG of 18-OCT-2019 17:16,  No significant change was found         Radiologic Studies -   CT HEAD WO CONT   Final Result   IMPRESSION:    No acute findings. Volume loss greater than expected for the patient's age of 81. Moderate degree of periventricular and beyond ischemic white matter change. XR CHEST PA LAT    (Results Pending)   XR FOOT RT MIN 3 V    (Results Pending)   CTA CHEST W OR W WO CONT    (Results Pending)   XR CHEST PORT    (Results Pending)   XR CHEST PORT    (Results Pending)   XR CHEST PORT    (Results Pending)   XR CHEST PORT    (Results Pending)         Medical Decision Making   I am the first provider for this patient. I reviewed the vital signs, available nursing notes, past medical history, past surgical history, family history and social history. Vital Signs-Reviewed the patient's vital signs. Records Reviewed: Nursing Notes and Old Medical Records (Time of Review: 5:53 PM)    ED Course: Progress Notes, Reevaluation, and Consults:  5:57 PM met with patient, reviewed history, performed physical exam.  As patient had a syncopal episode, will obtain CT head without contrast to rule out any intracranial abnormalities. Will obtain chest x-ray given complaints of shortness of breath to rule out pulmonary abnormality. We will also obtain right foot x-ray given recent fall with trauma. Will obtain CBC and CMP, give patient a normal saline bolus and a DuoNeb.    6:53 PM CT head shows no acute intracranial abnormalities. CBC and CMP unremarkable. 7:09 PM Reviewed CXR, mild hazy opacity of right lower lung fields.  Foot radiograph shows fifth metatarsal fracture. 8:20 PM Patient desaturated on room air while attempting to transfer to bedside commode. Patient placed on 2L supplemental oxygen via nasal cannula with improvement of oxygen saturations. Discussed case with hospitalist Dr Lis Sapp for admission. Patient will be admitted to hospitalist services for syncope and COPD exacerbation. Will obtain CTA chest to rule out PE prior to admission. 9:00 PM While receiving solumedrol and magnesium sulfate, patient developed respiratory arrest, requiring intubation by attending physician Dr Ugo Simmons. Patient will now be admitted to the ICU for further care. 9:41 PM Patient returned from radiology. CTA completed, awaiting read. Dr Ugo Simmons is awaiting return call from intensivist for admission to ICU.    9:56 PM Patient will be transferred to the care of Dr Ugo Simmons, attending physician. Sign out was completed. Anticipated disposition is admission to the ICU. CTA has been completing and is pending an official radiologist read. Provider Notes (Medical Decision Making):   81year old female seen in the ED after a syncopal episode. CT head negative for intracranial abnormalities. Patient was receiving treatment for COPD exacerbation. During treatment, patient decompensated due to respiratory failure. Patient intubated by attending physician Dr Ugo Simmons. Patient will now be admitted to ICU for further care. As noted above, the patient has been transferred to the care of Dr Ugo Simmons, attending physician. Sign out has been completed. CTA has been completed and is pending official radiologist read. Patient's anticipated disposition is admission to the ICU. Critical Care Time: 90 minutes    For Hospitalized Patients:    1. Hospitalization Decision Time:  The decision to hospitalize the patient was made by Dr. Ugo Simmons at 8:20PM on 10/18/2019    2.  Aspirin: Aspirin was not given because the patient did not present with a stroke at the time of their Emergency Department evaluation    Diagnosis     Clinical Impression:   1. Acute respiratory failure with hypoxia ([address]    2. Syncope and collapse    3. COPD exacerbation ([address]    4. Displaced fracture of fifth metatarsal bone, right foot, initial encounter for closed fracture        Disposition: Admit to ICU. Richard Coleman PA-C  10:07 PM    Dictation disclaimer:  Please note that this dictation was completed with [employer]ool[employer], the computer voice recognition software. Quite often unanticipated grammatical, syntax, homophones, and other interpretive errors are inadvertently transcribed by the computer software. Please disregard these errors. Please excuse any errors that have escaped final proofreading.

## 2019-10-18 NOTE — ED NOTES
Pt. Up to bedside commode and dizzy upon standing Ortho static vitals are stable, however, the pt had a noted drop in oxygen saturation to 89% while transferring from the bed to the commode and back to the bed with an obvious increased workload in breathing. PA notified and will continue to monitor. Urine output of 60 mls.

## 2019-10-18 NOTE — Clinical Note
TRANSFER - OUT REPORT:  
 
Verbal report given to: Janeth DON. Report consisted of patient's Situation, Background, Assessment and  
Recommendations(SBAR). Opportunity for questions and clarification was provided. Patient transported with a Cardiac Cath Tech / Patient Care Tech. Patient transported to: [address]

## 2019-10-18 NOTE — ED TRIAGE NOTES
Pt co sob states has worsened over past week pt states SOB is causing dizziness. Pt states had a syncopal episode 2 days ago after being SOB and becoming dizzy.

## 2019-10-18 NOTE — Clinical Note
TRANSFER - IN REPORT:  
 
Verbal report received from: Sonia Alvarez. Report consisted of patient's Situation, Background, Assessment and  
Recommendations(SBAR). Opportunity for questions and clarification was provided. Assessment completed upon patient's arrival to unit and care assumed. Patient transported with a Cardiac Cath Tech / Patient Care Tech.

## 2019-10-19 ENCOUNTER — APPOINTMENT (OUTPATIENT)
Dept: GENERAL RADIOLOGY | Age: 69
DRG: 208 | End: 2019-10-19
Attending: PHYSICIAN ASSISTANT
Payer: MEDICARE

## 2019-10-19 ENCOUNTER — APPOINTMENT (OUTPATIENT)
Dept: NON INVASIVE DIAGNOSTICS | Age: 69
DRG: 208 | End: 2019-10-19
Attending: INTERNAL MEDICINE
Payer: MEDICARE

## 2019-10-19 PROBLEM — J96.90 RESPIRATORY FAILURE REQUIRING INTUBATION (HCC): Status: ACTIVE | Noted: 2019-10-19

## 2019-10-19 PROBLEM — J44.1 COPD WITH ACUTE EXACERBATION (HCC): Status: ACTIVE | Noted: 2019-10-19

## 2019-10-19 PROBLEM — I50.1 PULMONARY EDEMA CARDIAC CAUSE (HCC): Status: ACTIVE | Noted: 2019-10-19

## 2019-10-19 LAB
AMPHET UR QL SCN: NEGATIVE
ANION GAP SERPL CALC-SCNC: 13 MMOL/L (ref 3–18)
APTT PPP: 28.5 SEC (ref 23–36.4)
ARTERIAL PATENCY WRIST A: YES
ARTERIAL PATENCY WRIST A: YES
ATRIAL RATE: 103 BPM
ATRIAL RATE: 92 BPM
BACTERIA SPEC CULT: NORMAL
BARBITURATES UR QL SCN: NEGATIVE
BASE DEFICIT BLD-SCNC: 3 MMOL/L
BASE DEFICIT BLD-SCNC: 3 MMOL/L
BASOPHILS # BLD: 0 K/UL (ref 0–0.1)
BASOPHILS NFR BLD: 0 % (ref 0–2)
BDY SITE: ABNORMAL
BDY SITE: ABNORMAL
BENZODIAZ UR QL: POSITIVE
BNP SERPL-MCNC: 1733 PG/ML (ref 0–900)
BUN SERPL-MCNC: 20 MG/DL (ref 7–18)
BUN/CREAT SERPL: 13 (ref 12–20)
CA-I SERPL-SCNC: 1.12 MMOL/L (ref 1.12–1.32)
CALCIUM SERPL-MCNC: 8.3 MG/DL (ref 8.5–10.1)
CALCIUM SERPL-MCNC: 8.7 MG/DL (ref 8.5–10.1)
CALCULATED P AXIS, ECG09: 57 DEGREES
CALCULATED P AXIS, ECG09: 77 DEGREES
CALCULATED R AXIS, ECG10: 12 DEGREES
CALCULATED R AXIS, ECG10: 66 DEGREES
CALCULATED T AXIS, ECG11: 77 DEGREES
CALCULATED T AXIS, ECG11: 88 DEGREES
CANNABINOIDS UR QL SCN: NEGATIVE
CHLORIDE SERPL-SCNC: 111 MMOL/L (ref 100–111)
CK MB CFR SERPL CALC: 1.4 % (ref 0–4)
CK MB CFR SERPL CALC: 1.6 % (ref 0–4)
CK MB CFR SERPL CALC: 1.7 % (ref 0–4)
CK MB CFR SERPL CALC: 2 % (ref 0–4)
CK MB SERPL-MCNC: 1.1 NG/ML (ref 5–25)
CK MB SERPL-MCNC: 1.2 NG/ML (ref 5–25)
CK MB SERPL-MCNC: 1.4 NG/ML (ref 5–25)
CK MB SERPL-MCNC: 1.7 NG/ML (ref 5–25)
CK SERPL-CCNC: 76 U/L (ref 26–192)
CK SERPL-CCNC: 77 U/L (ref 26–192)
CK SERPL-CCNC: 82 U/L (ref 26–192)
CK SERPL-CCNC: 85 U/L (ref 26–192)
CO2 SERPL-SCNC: 18 MMOL/L (ref 21–32)
COCAINE UR QL SCN: NEGATIVE
CREAT SERPL-MCNC: 1.53 MG/DL (ref 0.6–1.3)
DIAGNOSIS, 93000: NORMAL
DIAGNOSIS, 93000: NORMAL
DIFFERENTIAL METHOD BLD: ABNORMAL
EOSINOPHIL # BLD: 0 K/UL (ref 0–0.4)
EOSINOPHIL NFR BLD: 0 % (ref 0–5)
ERYTHROCYTE [DISTWIDTH] IN BLOOD BY AUTOMATED COUNT: 17.8 % (ref 11.6–14.5)
FERRITIN SERPL-MCNC: 30 NG/ML (ref 8–388)
FLUAV AG NPH QL IA: NEGATIVE
FLUBV AG NOSE QL IA: NEGATIVE
GAS FLOW.O2 O2 DELIVERY SYS: ABNORMAL L/MIN
GAS FLOW.O2 O2 DELIVERY SYS: ABNORMAL L/MIN
GAS FLOW.O2 SETTING OXYMISER: 12 BPM
GAS FLOW.O2 SETTING OXYMISER: 16 BPM
GLUCOSE BLD STRIP.AUTO-MCNC: 173 MG/DL (ref 70–110)
GLUCOSE BLD STRIP.AUTO-MCNC: 183 MG/DL (ref 70–110)
GLUCOSE BLD STRIP.AUTO-MCNC: 193 MG/DL (ref 70–110)
GLUCOSE BLD STRIP.AUTO-MCNC: 193 MG/DL (ref 70–110)
GLUCOSE BLD STRIP.AUTO-MCNC: 228 MG/DL (ref 70–110)
GLUCOSE SERPL-MCNC: 151 MG/DL (ref 74–99)
HCO3 BLD-SCNC: 22 MMOL/L (ref 22–26)
HCO3 BLD-SCNC: 23.4 MMOL/L (ref 22–26)
HCT VFR BLD AUTO: 35 % (ref 35–45)
HDSCOM,HDSCOM: ABNORMAL
HGB BLD-MCNC: 10.5 G/DL (ref 12–16)
INR PPP: 1 (ref 0.8–1.2)
INSPIRATION.DURATION SETTING TIME VENT: 1.25 SEC
INSPIRATION.DURATION SETTING TIME VENT: 1.25 SEC
IRON SATN MFR SERPL: 7 %
IRON SERPL-MCNC: 19 UG/DL (ref 50–175)
L PNEUMO AG UR QL IA: NEGATIVE
LACTATE SERPL-SCNC: 1.5 MMOL/L (ref 0.4–2)
LYMPHOCYTES # BLD: 0.8 K/UL (ref 0.9–3.6)
LYMPHOCYTES NFR BLD: 8 % (ref 21–52)
MAGNESIUM SERPL-MCNC: 1.9 MG/DL (ref 1.6–2.6)
MCH RBC QN AUTO: 25.2 PG (ref 24–34)
MCHC RBC AUTO-ENTMCNC: 30 G/DL (ref 31–37)
MCV RBC AUTO: 84.1 FL (ref 74–97)
METHADONE UR QL: NEGATIVE
MONOCYTES # BLD: 0.1 K/UL (ref 0.05–1.2)
MONOCYTES NFR BLD: 1 % (ref 3–10)
NEUTS SEG # BLD: 8.6 K/UL (ref 1.8–8)
NEUTS SEG NFR BLD: 91 % (ref 40–73)
O2/TOTAL GAS SETTING VFR VENT: 100 %
O2/TOTAL GAS SETTING VFR VENT: 50 %
OPIATES UR QL: NEGATIVE
P-R INTERVAL, ECG05: 148 MS
P-R INTERVAL, ECG05: 154 MS
PCO2 BLD: 34.6 MMHG (ref 35–45)
PCO2 BLD: 49.3 MMHG (ref 35–45)
PCP UR QL: NEGATIVE
PEEP RESPIRATORY: 5 CMH2O
PEEP RESPIRATORY: 5 CMH2O
PH BLD: 7.29 [PH] (ref 7.35–7.45)
PH BLD: 7.41 [PH] (ref 7.35–7.45)
PHOSPHATE SERPL-MCNC: 3.7 MG/DL (ref 2.5–4.9)
PLATELET # BLD AUTO: 308 K/UL (ref 135–420)
PMV BLD AUTO: 9.4 FL (ref 9.2–11.8)
PO2 BLD: 204 MMHG (ref 80–100)
PO2 BLD: 66 MMHG (ref 80–100)
POTASSIUM SERPL-SCNC: 4.3 MMOL/L (ref 3.5–5.5)
PROTHROMBIN TIME: 13.2 SEC (ref 11.5–15.2)
PTH-INTACT SERPL-MCNC: 386 PG/ML (ref 18.4–88)
Q-T INTERVAL, ECG07: 372 MS
Q-T INTERVAL, ECG07: 372 MS
QRS DURATION, ECG06: 76 MS
QRS DURATION, ECG06: 80 MS
QTC CALCULATION (BEZET), ECG08: 460 MS
QTC CALCULATION (BEZET), ECG08: 487 MS
RBC # BLD AUTO: 4.16 M/UL (ref 4.2–5.3)
S PNEUM AG UR QL: NEGATIVE
SAO2 % BLD: 100 % (ref 92–97)
SAO2 % BLD: 93 % (ref 92–97)
SERVICE CMNT-IMP: ABNORMAL
SERVICE CMNT-IMP: ABNORMAL
SERVICE CMNT-IMP: NORMAL
SODIUM SERPL-SCNC: 142 MMOL/L (ref 136–145)
SPECIMEN TYPE: ABNORMAL
SPECIMEN TYPE: ABNORMAL
TIBC SERPL-MCNC: 268 UG/DL (ref 250–450)
TOTAL RESP. RATE, ITRR: 15
TOTAL RESP. RATE, ITRR: 16
TROPONIN I SERPL-MCNC: <0.02 NG/ML (ref 0–0.04)
TSH SERPL DL<=0.05 MIU/L-ACNC: 0.47 UIU/ML (ref 0.36–3.74)
VENTILATION MODE VENT: ABNORMAL
VENTILATION MODE VENT: ABNORMAL
VENTRICULAR RATE, ECG03: 103 BPM
VENTRICULAR RATE, ECG03: 92 BPM
VOLUME CONTROL PLUS IVLCP: YES
VOLUME CONTROL PLUS IVLCP: YES
VT SETTING VENT: 500 ML
VT SETTING VENT: 500 ML
WBC # BLD AUTO: 9.5 K/UL (ref 4.6–13.2)

## 2019-10-19 PROCEDURE — 80307 DRUG TEST PRSMV CHEM ANLYZR: CPT

## 2019-10-19 PROCEDURE — 82043 UR ALBUMIN QUANTITATIVE: CPT

## 2019-10-19 PROCEDURE — 3E0G76Z INTRODUCTION OF NUTRITIONAL SUBSTANCE INTO UPPER GI, VIA NATURAL OR ARTIFICIAL OPENING: ICD-10-PCS | Performed by: INTERNAL MEDICINE

## 2019-10-19 PROCEDURE — 93308 TTE F-UP OR LMTD: CPT

## 2019-10-19 PROCEDURE — 87804 INFLUENZA ASSAY W/OPTIC: CPT

## 2019-10-19 PROCEDURE — 74011000250 HC RX REV CODE- 250: Performed by: INTERNAL MEDICINE

## 2019-10-19 PROCEDURE — 74011250636 HC RX REV CODE- 250/636: Performed by: PHYSICIAN ASSISTANT

## 2019-10-19 PROCEDURE — 83735 ASSAY OF MAGNESIUM: CPT

## 2019-10-19 PROCEDURE — 74011000258 HC RX REV CODE- 258: Performed by: INTERNAL MEDICINE

## 2019-10-19 PROCEDURE — 71045 X-RAY EXAM CHEST 1 VIEW: CPT

## 2019-10-19 PROCEDURE — 74011250637 HC RX REV CODE- 250/637: Performed by: INTERNAL MEDICINE

## 2019-10-19 PROCEDURE — 80048 BASIC METABOLIC PNL TOTAL CA: CPT

## 2019-10-19 PROCEDURE — 94003 VENT MGMT INPAT SUBQ DAY: CPT

## 2019-10-19 PROCEDURE — 82803 BLOOD GASES ANY COMBINATION: CPT

## 2019-10-19 PROCEDURE — 74011250636 HC RX REV CODE- 250/636: Performed by: INTERNAL MEDICINE

## 2019-10-19 PROCEDURE — 85025 COMPLETE CBC W/AUTO DIFF WBC: CPT

## 2019-10-19 PROCEDURE — 82330 ASSAY OF CALCIUM: CPT

## 2019-10-19 PROCEDURE — 85730 THROMBOPLASTIN TIME PARTIAL: CPT

## 2019-10-19 PROCEDURE — 83970 ASSAY OF PARATHORMONE: CPT

## 2019-10-19 PROCEDURE — 36415 COLL VENOUS BLD VENIPUNCTURE: CPT

## 2019-10-19 PROCEDURE — 83540 ASSAY OF IRON: CPT

## 2019-10-19 PROCEDURE — 87641 MR-STAPH DNA AMP PROBE: CPT

## 2019-10-19 PROCEDURE — 82962 GLUCOSE BLOOD TEST: CPT

## 2019-10-19 PROCEDURE — 74011000258 HC RX REV CODE- 258: Performed by: PHYSICIAN ASSISTANT

## 2019-10-19 PROCEDURE — 87040 BLOOD CULTURE FOR BACTERIA: CPT

## 2019-10-19 PROCEDURE — 36600 WITHDRAWAL OF ARTERIAL BLOOD: CPT

## 2019-10-19 PROCEDURE — 82728 ASSAY OF FERRITIN: CPT

## 2019-10-19 PROCEDURE — 94640 AIRWAY INHALATION TREATMENT: CPT

## 2019-10-19 PROCEDURE — 83605 ASSAY OF LACTIC ACID: CPT

## 2019-10-19 PROCEDURE — 74011636637 HC RX REV CODE- 636/637: Performed by: PHYSICIAN ASSISTANT

## 2019-10-19 PROCEDURE — 83880 ASSAY OF NATRIURETIC PEPTIDE: CPT

## 2019-10-19 PROCEDURE — 65610000006 HC RM INTENSIVE CARE

## 2019-10-19 PROCEDURE — 5A1945Z RESPIRATORY VENTILATION, 24-96 CONSECUTIVE HOURS: ICD-10-PCS | Performed by: PHYSICIAN ASSISTANT

## 2019-10-19 PROCEDURE — 84100 ASSAY OF PHOSPHORUS: CPT

## 2019-10-19 PROCEDURE — 82550 ASSAY OF CK (CPK): CPT

## 2019-10-19 PROCEDURE — 94762 N-INVAS EAR/PLS OXIMTRY CONT: CPT

## 2019-10-19 PROCEDURE — 85610 PROTHROMBIN TIME: CPT

## 2019-10-19 PROCEDURE — 0BH17EZ INSERTION OF ENDOTRACHEAL AIRWAY INTO TRACHEA, VIA NATURAL OR ARTIFICIAL OPENING: ICD-10-PCS | Performed by: PHYSICIAN ASSISTANT

## 2019-10-19 PROCEDURE — 77030010895 HC CIRC BRTH PT SIMS -B

## 2019-10-19 PROCEDURE — 74011000250 HC RX REV CODE- 250: Performed by: PHYSICIAN ASSISTANT

## 2019-10-19 PROCEDURE — 84443 ASSAY THYROID STIM HORMONE: CPT

## 2019-10-19 PROCEDURE — 94002 VENT MGMT INPAT INIT DAY: CPT

## 2019-10-19 RX ORDER — IPRATROPIUM BROMIDE AND ALBUTEROL SULFATE 2.5; .5 MG/3ML; MG/3ML
3 SOLUTION RESPIRATORY (INHALATION)
Status: DISCONTINUED | OUTPATIENT
Start: 2019-10-19 | End: 2019-10-19

## 2019-10-19 RX ORDER — CARVEDILOL 3.12 MG/1
3.12 TABLET ORAL 2 TIMES DAILY WITH MEALS
Status: DISCONTINUED | OUTPATIENT
Start: 2019-10-19 | End: 2019-10-20

## 2019-10-19 RX ORDER — BUDESONIDE 0.5 MG/2ML
500 INHALANT ORAL
Status: DISCONTINUED | OUTPATIENT
Start: 2019-10-19 | End: 2019-10-27 | Stop reason: HOSPADM

## 2019-10-19 RX ORDER — ACETAMINOPHEN 325 MG/1
650 TABLET ORAL
Status: DISCONTINUED | OUTPATIENT
Start: 2019-10-19 | End: 2019-10-27 | Stop reason: HOSPADM

## 2019-10-19 RX ORDER — MAGNESIUM SULFATE 100 %
4 CRYSTALS MISCELLANEOUS AS NEEDED
Status: DISCONTINUED | OUTPATIENT
Start: 2019-10-19 | End: 2019-10-27 | Stop reason: HOSPADM

## 2019-10-19 RX ORDER — SODIUM CHLORIDE 0.9 % (FLUSH) 0.9 %
5-40 SYRINGE (ML) INJECTION AS NEEDED
Status: DISCONTINUED | OUTPATIENT
Start: 2019-10-19 | End: 2019-10-27 | Stop reason: HOSPADM

## 2019-10-19 RX ORDER — HEPARIN SODIUM 5000 [USP'U]/ML
5000 INJECTION, SOLUTION INTRAVENOUS; SUBCUTANEOUS EVERY 8 HOURS
Status: DISCONTINUED | OUTPATIENT
Start: 2019-10-19 | End: 2019-10-27 | Stop reason: HOSPADM

## 2019-10-19 RX ORDER — SODIUM CHLORIDE 9 MG/ML
10 INJECTION INTRAMUSCULAR; INTRAVENOUS; SUBCUTANEOUS
Status: COMPLETED | OUTPATIENT
Start: 2019-10-19 | End: 2019-10-19

## 2019-10-19 RX ORDER — INSULIN LISPRO 100 [IU]/ML
INJECTION, SOLUTION INTRAVENOUS; SUBCUTANEOUS EVERY 6 HOURS
Status: DISCONTINUED | OUTPATIENT
Start: 2019-10-19 | End: 2019-10-20 | Stop reason: SDUPTHER

## 2019-10-19 RX ORDER — IPRATROPIUM BROMIDE AND ALBUTEROL SULFATE 2.5; .5 MG/3ML; MG/3ML
SOLUTION RESPIRATORY (INHALATION)
Status: DISPENSED
Start: 2019-10-19 | End: 2019-10-19

## 2019-10-19 RX ORDER — IPRATROPIUM BROMIDE AND ALBUTEROL SULFATE 2.5; .5 MG/3ML; MG/3ML
3 SOLUTION RESPIRATORY (INHALATION)
Status: DISCONTINUED | OUTPATIENT
Start: 2019-10-19 | End: 2019-10-22

## 2019-10-19 RX ORDER — ONDANSETRON 2 MG/ML
4 INJECTION INTRAMUSCULAR; INTRAVENOUS
Status: DISCONTINUED | OUTPATIENT
Start: 2019-10-19 | End: 2019-10-27 | Stop reason: HOSPADM

## 2019-10-19 RX ORDER — MIDAZOLAM HYDROCHLORIDE 1 MG/ML
.5-1 INJECTION, SOLUTION INTRAMUSCULAR; INTRAVENOUS
Status: DISCONTINUED | OUTPATIENT
Start: 2019-10-19 | End: 2019-10-20

## 2019-10-19 RX ORDER — CARVEDILOL 3.12 MG/1
3.12 TABLET ORAL 2 TIMES DAILY WITH MEALS
Status: DISCONTINUED | OUTPATIENT
Start: 2019-10-19 | End: 2019-10-19

## 2019-10-19 RX ORDER — DEXTROSE 50 % IN WATER (D50W) INTRAVENOUS SYRINGE
25-50 AS NEEDED
Status: DISCONTINUED | OUTPATIENT
Start: 2019-10-19 | End: 2019-10-24

## 2019-10-19 RX ORDER — SODIUM CHLORIDE 0.9 % (FLUSH) 0.9 %
5-40 SYRINGE (ML) INJECTION EVERY 8 HOURS
Status: DISCONTINUED | OUTPATIENT
Start: 2019-10-19 | End: 2019-10-27 | Stop reason: HOSPADM

## 2019-10-19 RX ADMIN — DEXMEDETOMIDINE HYDROCHLORIDE 0.2 MCG/KG/HR: 100 INJECTION, SOLUTION INTRAVENOUS at 04:13

## 2019-10-19 RX ADMIN — SODIUM CHLORIDE 10 ML: 9 INJECTION INTRAMUSCULAR; INTRAVENOUS; SUBCUTANEOUS at 11:32

## 2019-10-19 RX ADMIN — AZITHROMYCIN MONOHYDRATE 500 MG: 500 INJECTION, POWDER, LYOPHILIZED, FOR SOLUTION INTRAVENOUS at 22:22

## 2019-10-19 RX ADMIN — BUDESONIDE 500 MCG: 0.5 INHALANT RESPIRATORY (INHALATION) at 07:58

## 2019-10-19 RX ADMIN — WATER 2 G: 1 INJECTION INTRAMUSCULAR; INTRAVENOUS; SUBCUTANEOUS at 22:22

## 2019-10-19 RX ADMIN — IPRATROPIUM BROMIDE AND ALBUTEROL SULFATE 3 ML: .5; 3 SOLUTION RESPIRATORY (INHALATION) at 15:44

## 2019-10-19 RX ADMIN — HEPARIN SODIUM 5000 UNITS: 5000 INJECTION INTRAVENOUS; SUBCUTANEOUS at 09:52

## 2019-10-19 RX ADMIN — IPRATROPIUM BROMIDE AND ALBUTEROL SULFATE 3 ML: .5; 3 SOLUTION RESPIRATORY (INHALATION) at 01:32

## 2019-10-19 RX ADMIN — BUDESONIDE 500 MCG: 0.5 INHALANT RESPIRATORY (INHALATION) at 19:29

## 2019-10-19 RX ADMIN — FAMOTIDINE 20 MG: 10 INJECTION INTRAVENOUS at 02:27

## 2019-10-19 RX ADMIN — METHYLPREDNISOLONE SODIUM SUCCINATE 40 MG: 40 INJECTION, POWDER, FOR SOLUTION INTRAMUSCULAR; INTRAVENOUS at 20:54

## 2019-10-19 RX ADMIN — CARVEDILOL 3.12 MG: 3.12 TABLET, FILM COATED ORAL at 17:14

## 2019-10-19 RX ADMIN — INSULIN LISPRO 2 UNITS: 100 INJECTION, SOLUTION INTRAVENOUS; SUBCUTANEOUS at 12:00

## 2019-10-19 RX ADMIN — Medication 100 MCG/HR: at 02:42

## 2019-10-19 RX ADMIN — SODIUM CHLORIDE, SODIUM ACETATE ANHYDROUS, SODIUM GLUCONATE, POTASSIUM CHLORIDE, AND MAGNESIUM CHLORIDE: 526; 222; 502; 37; 30 INJECTION, SOLUTION INTRAVENOUS at 20:43

## 2019-10-19 RX ADMIN — MIDAZOLAM HYDROCHLORIDE 1 MG: 2 INJECTION, SOLUTION INTRAMUSCULAR; INTRAVENOUS at 22:22

## 2019-10-19 RX ADMIN — METHYLPREDNISOLONE SODIUM SUCCINATE 40 MG: 40 INJECTION, POWDER, FOR SOLUTION INTRAMUSCULAR; INTRAVENOUS at 02:19

## 2019-10-19 RX ADMIN — DEXMEDETOMIDINE HYDROCHLORIDE 0.3 MCG/KG/HR: 100 INJECTION, SOLUTION INTRAVENOUS at 22:20

## 2019-10-19 RX ADMIN — HEPARIN SODIUM 5000 UNITS: 5000 INJECTION INTRAVENOUS; SUBCUTANEOUS at 17:14

## 2019-10-19 RX ADMIN — Medication 10 ML: at 02:27

## 2019-10-19 RX ADMIN — DEXMEDETOMIDINE HYDROCHLORIDE 0.2 MCG/KG/HR: 100 INJECTION, SOLUTION INTRAVENOUS at 02:55

## 2019-10-19 RX ADMIN — INSULIN LISPRO 4 UNITS: 100 INJECTION, SOLUTION INTRAVENOUS; SUBCUTANEOUS at 03:09

## 2019-10-19 RX ADMIN — Medication 10 ML: at 17:16

## 2019-10-19 RX ADMIN — IPRATROPIUM BROMIDE AND ALBUTEROL SULFATE 3 ML: .5; 3 SOLUTION RESPIRATORY (INHALATION) at 19:30

## 2019-10-19 RX ADMIN — SODIUM CHLORIDE, SODIUM ACETATE ANHYDROUS, SODIUM GLUCONATE, POTASSIUM CHLORIDE, AND MAGNESIUM CHLORIDE: 526; 222; 502; 37; 30 INJECTION, SOLUTION INTRAVENOUS at 02:52

## 2019-10-19 RX ADMIN — METHYLPREDNISOLONE SODIUM SUCCINATE 40 MG: 40 INJECTION, POWDER, FOR SOLUTION INTRAMUSCULAR; INTRAVENOUS at 09:52

## 2019-10-19 RX ADMIN — Medication 10 ML: at 21:02

## 2019-10-19 RX ADMIN — IPRATROPIUM BROMIDE AND ALBUTEROL SULFATE 3 ML: .5; 3 SOLUTION RESPIRATORY (INHALATION) at 07:59

## 2019-10-19 RX ADMIN — INSULIN LISPRO 2 UNITS: 100 INJECTION, SOLUTION INTRAVENOUS; SUBCUTANEOUS at 06:28

## 2019-10-19 RX ADMIN — Medication 10 ML: at 06:29

## 2019-10-19 RX ADMIN — IPRATROPIUM BROMIDE AND ALBUTEROL SULFATE 3 ML: .5; 3 SOLUTION RESPIRATORY (INHALATION) at 13:10

## 2019-10-19 RX ADMIN — INSULIN LISPRO 2 UNITS: 100 INJECTION, SOLUTION INTRAVENOUS; SUBCUTANEOUS at 23:33

## 2019-10-19 RX ADMIN — INSULIN LISPRO 2 UNITS: 100 INJECTION, SOLUTION INTRAVENOUS; SUBCUTANEOUS at 19:46

## 2019-10-19 RX ADMIN — Medication 100 MCG/HR: at 12:00

## 2019-10-19 RX ADMIN — HEPARIN SODIUM 5000 UNITS: 5000 INJECTION INTRAVENOUS; SUBCUTANEOUS at 02:25

## 2019-10-19 RX ADMIN — IPRATROPIUM BROMIDE AND ALBUTEROL SULFATE 3 ML: .5; 3 SOLUTION RESPIRATORY (INHALATION) at 04:15

## 2019-10-19 RX ADMIN — Medication 100 MCG/HR: at 20:54

## 2019-10-19 NOTE — ROUTINE PROCESS
TRANSFER - OUT REPORT:    Verbal report given to Maribell Murphy RN(name) on Mel Maj[facility]  [facility] progression of care       Report consisted of patients Situation, Background, Assessment and   Recommendations(SBAR). Information from the following report(s) SBAR, ED Summary, Procedure Summary, Intake/Output, MAR, Accordion, Recent Results and Cardiac Rhythm NSR was reviewed with the receiving nurse. Lines:   Peripheral IV 10/18/19 Right Antecubital (Active)   Site Assessment Clean, dry, & intact 10/18/2019  5:15 PM   Dressing Status Clean, dry, & intact 10/18/2019  5:15 PM   Dressing Type Transparent 10/18/2019  5:15 PM   Hub Color/Line Status Flushed 10/18/2019  5:15 PM       Peripheral IV 10/18/19 Left; Lower Forearm (Active)        Opportunity for questions and clarification was provided.       Patient transported with:   Monitor  O2 @ 15 liters   Vent

## 2019-10-19 NOTE — ED NOTES
Pt. Now maxed out on versed and still fighting the tube and anxious and restless. MD notified and he stated to hold the Versed due to Low SBP and patient not sedated and begin Ketamine infusion.

## 2019-10-19 NOTE — PROGRESS NOTES
NUTRITION    Nutrition Consult: Management of Tube Feeding      RECOMMENDATIONS / PLAN:     - Start tube feeding of Vital High Protein at 10 mL/hr and advance as tolerated by 10 mL q 8 hours to goal rate of 50 mL/hr with Prosource once daily, 50 mL q 4 hour water flushes and daily multivitamin.   - IV fluid per MD.   - Continue RD inpatient monitoring and evaluation. Goal Regimen: Vital High Protein at 50 mL/hr + Prosource once daily + 50 mL q 4 hour water flushes to provide: 1260 kcal, 120 gm protein, 134 gm CHO, 0 gm fiber, 1003 mL free water, 1303 mL total water, 84% RDIs     NUTRITION INTERVENTIONS & DIAGNOSIS:     - Enteral nutrition: modify regimen   - IV fluid: Normosol at 75 mL/hr    Nutrition Diagnosis: Inadequate oral intake related to respiratory status as evidenced by pt NPO, intubated. ASSESSMENT:     Intubated for respiratory distress with OGT clamped. Hyperglycemia, receiving steroid. Hx of renal insufficiency. Electrolytes WNL. Nutritional intake adequate to meet patients estimated nutritional needs:  Unable to determine at this time    Diet: DIET TUBE FEEDING      Food Allergies: NKFA  Current Appetite: NPO  Appetite/meal intake prior to admission: Unable to determine at this time  Feeding Limitations:  [] Swallowing difficulty    [] Chewing difficulty    [x] Other: respiratory status  Current Meal Intake: No data found.     BM: PTA  Skin Integrity: WDL  Edema:   [x] No     [] Yes   Pertinent Medications: Reviewed    Recent Labs     10/19/19  0125 10/18/19  1715   NA  --  143   K  --  4.1   CL  --  113*   CO2  --  24   GLU  --  141*   BUN  --  21*   CREA  --  1.49*   CA  --  9.1   PHOS 3.7  --    ALB  --  3.2*   SGOT  --  8*   ALT  --  13       Intake/Output Summary (Last 24 hours) at 10/19/2019 1430  Last data filed at 10/19/2019 1411  Gross per 24 hour   Intake 2453.71 ml   Output 2805 ml   Net -351.29 ml       Anthropometrics:  Ht Readings from Last 1 Encounters:   10/19/19 5' 4\" (1.626 m)     Last 3 Recorded Weights in this Encounter    10/18/19 1718 10/19/19 0100 10/19/19 1115   Weight: 101.2 kg (223 lb) 103.4 kg (228 lb) 103.4 kg (228 lb)     Body mass index is 39.14 kg/m². Obese, Class II     Weight History: patient previously reported usual weight of 206 lb 5/2018 with weight gain PTA per chart hx review     Weight Metrics 10/19/2019 10/11/2019 9/30/2019 9/10/2019 9/9/2019 8/28/2019 8/15/2019   Weight 228 lb 232 lb 216 lb 218 lb 12.8 oz 217 lb 12.8 oz 218 lb 8 oz 220 lb   BMI 39.14 kg/m2 39.82 kg/m2 37.08 kg/m2 37.56 kg/m2 37.39 kg/m2 37.51 kg/m2 37.76 kg/m2        Admitting Diagnosis: Syncope [R55]  COPD exacerbation (HCC) [J44.1]  Acute on chronic respiratory failure with hypoxia (HCC) [J96.21]  Flash pulmonary edema (HCC) [J81.0]  Elevated d-dimer [R79.89]  COPD with acute exacerbation (HCC) [J44.1]  Respiratory failure requiring intubation (HCC) [J96.90]  Pulmonary edema cardiac cause ([facility] [address] [I50.1]  Pertinent PMHx: CAD, COPD, DM, HTN, HLD, obesity     Education Needs:        [x] None identified  [] Identified - Not appropriate at this time  []  Identified and addressed - refer to education log  Learning Limitations:   [] None identified  [x] Identified: altered mentation     Cultural, Gnosticism & ethnic food preferences:  [x] None identified    [] Identified and addressed     ESTIMATED NUTRITION NEEDS:     Calories: 2777-3213 kcal (11-14 kcal/kg) based on  [x] Actual BW  103 kg    [] IBW   Protein: 110-138 gm (2-2.5 gm/kg) based on  [] Actual BW      [x] IBW 55 kg  Fluid: 1 mL/kcal     MONITORING & EVALUATION:     Nutrition Goal(s):   - Enteral nutrition intake will meet >75% of patient estimated nutritional needs within the next 7 days.    Outcome: New/Initial goal     Monitoring:  [x] Food and nutrient intake   [x] Food and nutrient administration  [x] Comparative standards   [x] Nutrition-focused physical findings   [x] Anthropometric Measurements   [x] Treatment/therapy   [x] Biochemical data, medical tests, and procedures        Previous Recommendations (for follow-up assessments only):  Not Applicable     Discharge Planning: Nutritional discharge needs unknown at this time. Participated in care planning, discharge planning, & interdisciplinary rounds as appropriate.       Valeria Goodwin RD, [address]   Pager: [phone]

## 2019-10-19 NOTE — CONSULTS
Consult Note  Consult requested by: Dr. Abigail Cerrato is a 81 y.o. female [address] who is being seen on consult for renal failure  Chief Complaint   Patient presents with    Shortness of Breath     Admission diagnosis: short of breath     76y F with PMH HTN, CKD, COPD, heart failure, admitted for respiratory failure, seen for renal insufficieny  Impression & Plan:   IMPRESSION:   CARA,   CKD 3, baseline creatinine arorund 1.4  Heavy proteinuria  Respiratory failure, acute, intubated  Heart failure  COPD  Anemia    PLAN:    Decrease IV fluid to 25cc/hr. Check Urine protien study, PTH, anemia panel. Adjust meds per current renal function status. Discussed with Dr. Dennis Mckeon  Thank you very much for allowing me to participate in the care of this patient. We will continue to monitor with you and make recommendations as needed.       Patient is intubated not able to provide history   HPI:  76y F with PMH DM, heart failure cardiomyopathy CKD presented to ER complaining shortfo breath. She also had syncopal event . In ER she was treated for repsiratory failure, which got worst and she was intubated for respiratory failure. She is admitted to ICU, for respiraroty failure, receiving steroid, abx and diurtics. Due to her renal insufficiency ,nephrology service consulted. During my evaluation, she appears comfortable, well sychronized with vent. Her imaging study shows no PE, bilateral infiltrate on cxr and CT head negative for any acute finding.    Past Medical History:   Diagnosis Date    Abnormal WBC count 5/17/2016    Bilateral shoulder pain 9/27/2016    Chondromalacia of both patellae     COPD (chronic obstructive pulmonary disease) ([address] 9/2015    mild-mod dz; Dr Villa Sine    Diabetes [facility]) 2013    Diabetic eye exam ([address] 2016    Dilated cardiomyopathy ([address]     Dyslipidemia     Gout     Heart attack ([address]     Heart attack ([address]     History of echocardiogram 2014    Mild LVE. EF 40%. Mild, diffuse hypk. Mild LAE.   Mild MR.      Hypercholesteremia 14    Hypertension 2000    Noncompliance with medications 2016    Obesity     Orthostatic hypotension 2016    Osteoarthritis of both knees     Pain management 2016    Dr. Beto Patel Popliteal cyst, bilateral      Vitamin D deficiency 10/16/14      Past Surgical History:   Procedure Laterality Date    HX HEART CATHETERIZATION      HX TUBAL LIGATION         Social History     Socioeconomic History    Marital status: LEGALLY      Spouse name: Not on file    Number of children: 3    Years of education: Not on file    Highest education level: Not on file   Occupational History    Occupation: retired   Social Needs    Financial resource strain: Not on file    Food insecurity:     Worry: Not on file     Inability: Not on file   [place] needs:     Medical: Not on file     Non-medical: Not on file   Tobacco Use    Smoking status: Former Smoker     Packs/day: 0.25     Years: 48.00     Pack years: 12.00     Types: Cigarettes     Last attempt to quit: 2018     Years since quittin.4    Smokeless tobacco: Never Used   Substance and Sexual Activity    Alcohol use: No     Alcohol/week: 0.0 standard drinks    Drug use: No    Sexual activity: Yes     Partners: Male   Lifestyle    Physical activity:     Days per week: Not on file     Minutes per session: Not on file    Stress: Not on file   Relationships    Social connections:     Talks on phone: Not on file     Gets together: Not on file     Attends Faith service: Not on file     Active member of club or organization: Not on file     Attends meetings of clubs or organizations: Not on file     Relationship status: Not on file    Intimate partner violence:     Fear of current or ex partner: Not on file     Emotionally abused: Not on file     Physically abused: Not on file     Forced sexual activity: Not on file   Other Topics Concern     Service No    Blood Transfusions No    Caffeine Concern No    Occupational Exposure No    Hobby Hazards No    Sleep Concern No    Stress Concern No    Weight Concern No    Special Diet No    Back Care No    Exercise No    Bike Helmet No    Seat Belt Yes    Self-Exams Yes   Social History Narrative    Not on file       Family History   Problem Relation Age of Onset    Diabetes Mother     Hypertension Mother     Hypertension Father     Kidney Disease Maternal Aunt 50        Dialysis     No Known Allergies     Home Medications:     Prior to Admission Medications   Prescriptions Last Dose Informant Patient Reported? Taking? albuterol (PROVENTIL HFA, VENTOLIN HFA, PROAIR HFA) 90 mcg/actuation inhaler   No Yes   Sig: Take 2 Puffs by inhalation every four (4) hours as needed for Wheezing. aspirin 81 mg chewable tablet   No Yes   Sig: Take 2 Tabs by mouth daily. atorvastatin (LIPITOR) 20 mg tablet   No Yes   Sig: Take 1 Tab by mouth nightly. Patient taking differently: Take 20 mg by mouth two (2) times a day. carvedilol (COREG) 6.25 mg tablet   No Yes   Sig: Take 1 Tab by mouth two (2) times daily (with meals). desloratadine (CLARINEX) 5 mg tablet   No Yes   Sig: Take 1 Tab by mouth daily as needed for Allergies. ferrous sulfate 325 mg (65 mg iron) tablet   No Yes   Sig: Take 1 Tab by mouth two (2) times a day. Indications: IRON DEFICIENCY ANEMIA   furosemide (LASIX) 40 mg tablet   No Yes   Sig: Take 1 Tab by mouth two (2) times a day. insulin detemir (LEVEMIR) 100 unit/mL injection   No Yes   Sig: 15 units daily for diabetes   mometasone (NASONEX) 50 mcg/actuation nasal spray   No Yes   Si Sprays by Both Nostrils route daily as needed. For allergies   raNITIdine (ZANTAC) 150 mg tablet   No Yes   Sig: Take 1 Tab by mouth two (2) times a day.    sacubitril-valsartan (ENTRESTO) 97 mg/103 mg tablet   No Yes   Sig: Take 1 Tab by mouth two (2) times a day.   tizanidine HCl (TIZANIDINE PO)   Yes Yes   Sig: Take  by mouth. umeclidinium-vilanterol (ANORO ELLIPTA) 62.5-25 mcg/actuation inhaler   No Yes   Sig: Take 1 Puff by inhalation daily.  For COPD      Facility-Administered Medications: None       Current Facility-Administered Medications   Medication Dose Route Frequency    heparin (porcine) injection 5,000 Units  5,000 Units SubCUTAneous Q8H    sodium chloride (NS) flush 5-40 mL  5-40 mL IntraVENous Q8H    sodium chloride (NS) flush 5-40 mL  5-40 mL IntraVENous PRN    electrolyte-r (NORMOSOL R) infusion   IntraVENous CONTINUOUS    acetaminophen (TYLENOL) tablet 650 mg  650 mg Oral Q6H PRN    ondansetron (ZOFRAN) injection 4 mg  4 mg IntraVENous Q6H PRN    insulin lispro (HUMALOG) injection   SubCUTAneous Q6H    glucose chewable tablet 16 g  4 Tab Oral PRN    glucagon (GLUCAGEN) injection 1 mg  1 mg IntraMUSCular PRN    dextrose (D50W) injection syrg 12.5-25 g  25-50 mL IntraVENous PRN    fentaNYL (PF) 900 mcg/30 ml infusion soln  0-200 mcg/hr IntraVENous TITRATE    dexmedeTOMidine (PRECEDEX) 400 mcg in 0.9% sodium chloride 100 mL infusion  0.2-1.5 mcg/kg/hr IntraVENous TITRATE    budesonide (PULMICORT) 500 mcg/2 ml nebulizer suspension  500 mcg Nebulization BID RT    albuterol-ipratropium (DUO-NEB) 2.5 MG-0.5 MG/3 ML  3 mL Nebulization Q4H RT    methylPREDNISolone (PF) (SOLU-MEDROL) injection 40 mg  40 mg IntraVENous Q12H    famotidine (PF) (PEPCID) 20 mg in sodium chloride 0.9% 10 mL injection  20 mg IntraVENous Q24H    carvedilol (COREG) tablet 3.125 mg  3.125 mg Oral BID WITH MEALS    [START ON 91/21/2773] multivit-folic acid-herbal 323 (WELLESSE PLUS) oral liquid 30 mL  30 mL Per NG tube DAILY    cefTRIAXone (ROCEPHIN) 2 g in sterile water (preservative free) 20 mL IV syringe  2 g IntraVENous Q24H    azithromycin (ZITHROMAX) 500 mg in  mL  500 mg IntraVENous Q24H       Review of Systems:     Not able to provide  Data Review:    Labs: Results:       Chemistry Recent Labs     10/19/19  0125 10/18/19  1715   * 141*    143   K 4.3 4.1    113*   CO2 18* 24   BUN 20* 21*   CREA 1.53* 1.49*   CA 8.3* 9.1   AGAP 13 6   BUCR 13 14   AP  --  130*   TP  --  6.9   ALB  --  3.2*   GLOB  --  3.7   AGRAT  --  0.9      CBC w/Diff Recent Labs     10/19/19  0125 10/18/19  1715   WBC 9.5 6.6   RBC 4.16* 3.89*   HGB 10.5* 9.8*   HCT 35.0 33.6*    295   GRANS 91* 58   LYMPH 8* 32   EOS 0 2      Coagulation Recent Labs     10/19/19  0125   PTP 13.2   INR 1.0   APTT 28.5       Iron/Ferritin No results for input(s): IRON in the last 72 hours. No lab exists for component: TIBCCALC   BNP No results for input(s): BNPP in the last 72 hours.    Cardiac Enzymes Recent Labs     10/19/19  1440 10/19/19  0836   CPK 82 77   CKND1 1.7 1.6      Liver Enzymes Recent Labs     10/18/19  1715   TP 6.9   ALB 3.2*   *   SGOT 8*      Thyroid Studies Lab Results   Component Value Date/Time    TSH 1.01 05/21/2018 06:23 AM         EKG: sinus     Physical Assessment:     Visit Vitals  /82   Pulse 71   Temp 98.2 °F (36.8 °C)   Resp 16   Ht 5' 4\" (1.626 m)   Wt 103.4 kg (228 lb)   SpO2 98%   BMI 39.14 kg/m²     Weight change:     Intake/Output Summary (Last 24 hours) at 10/19/2019 1541  Last data filed at 10/19/2019 1411  Gross per 24 hour   Intake 2453.71 ml   Output 2805 ml   Net -351.29 ml     Physical Exam:   General: intubated  HEENT  supple neck, no thyromegaly  CVS: S1S2 heard,  no rub  RS: + air entry b/l,   Abd: Soft, Non tender,   Neuro: intubated, sedated  Extrm: + edema, no cyanosis, clubbing   Skin: no visible  Rash  Musculoskeletal: No gross joints or bone deformities     Procedures/imaging: see electronic medical records for all procedures, Xrays and details which were not copied into this note but were reviewed prior to creation of Erika Cintron MD  October 19, 2019  [facility]  Office [phone]

## 2019-10-19 NOTE — ED NOTES
Pt. maxed on Ketamine and blood pressure sustaining, however, pt is still restless and anxious and not sedated. MD notified and he stated to restart versed at 4mg and titrate but watch the SBP below 90. Pharmacy called to confirm order and agreed to monitor pressure and started the versed. MD notified.

## 2019-10-19 NOTE — ED NOTES
Pt. Complaining of SOB and reassessed at this time with MD present, pt is in bed having SOB with accessory muscle use and MD at bedside, pt is now getting anxious and eyes rolled back in her head. 2035-Additional Nursing help was called MD and Primary nurse at bedside now ventilating the patient in Resp. Distress. Pt non-responsive with a pulse at this time. Crash cart to bedside. 2039- Pt prepped for intubation at this time. Pt was given 100mg of succicholine and a total of 5mg of versed. 2042- Pt intubated by Fred Espinoza MD with CO2 detector confirming placement with color change. 2044-Chest Xray confirmed placement of ET tube and an OG tube placed at this time and also confirmed placement via Xray.

## 2019-10-19 NOTE — H&P
[facility]  Pulmonary, Critical Care, and Sleep Medicine    Name: Desire Bailey MRN: [id number]   : 1950 Hospital: [address] [place] [address]   Date: 10/19/2019        Critical Care History and Physical      IMPRESSION:   · Acute Respiratory Failure with Hypoxia  · Pulmonary Edema  · Pulmonary Infiltrates   · AECOPD  · CAD  · COPD  · CMO  · DM  · HTN  · HLD  · Obesity  · Renal Insufficiency   · OA  · Former Smoker     Patient Active Problem List   Diagnosis Code    Cardiomyopathy, dilated ([facility] I42.0    Environmental allergies Z91.09    Diabetes mellitus type 2, insulin dependent ([facility] E11.9, Z79.4    COPD (chronic obstructive pulmonary disease) ([facility] J44.9    Essential hypertension I10    Iron deficiency anemia D50.9    Decreased GFR R94.4    Dyslipidemia, goal LDL below 70 E78.5    Former smoker Z87.891    Osteoarthritis of both knees M17.0    Chronic pain of both knees M25.561, M25.562, G89.29    Chronic pain syndrome G89.4    Primary osteoarthritis of both knees M17.0    Chronic obstructive pulmonary disease (HCC) J44.9    Compliance with medication regimen Z91.89    Bilateral shoulder pain M25.511, M25.512    Elevated alkaline phosphatase level R74.8    Caregiver stress Z63.6    STEMI (ST elevation myocardial infarction) ([facility] I21.3    Acute pulmonary edema (HCC) J81.0    Coronary artery disease involving native coronary artery of native heart without angina pectoris I25.10    Pulmonary edema J81.1    Chest pain R07.9    SOB (shortness of breath) R06.02    Coronary artery disease involving native coronary artery with unstable angina pectoris (HCC) I25.110    Type 2 diabetes with nephropathy (HCC) E11.21    Severe obesity (BMI 35.0-39. 9) with comorbidity ([facility] E66.01    COPD exacerbation ([facility] J44.1    CAP (community acquired pneumonia) J18.9    Combined systolic and diastolic congestive heart failure (HCC) I50.40    Type 2 diabetes mellitus with hyperglycemia (HCC) E11.65    TESSA on CPAP G47.33, Z99.89    Ischemic cardiomyopathy I25.5    Syncope R55    Flash pulmonary edema (HCC) J81.0    Elevated d-dimer R79.89    Acute on chronic respiratory failure with hypoxia (HCC) J96.21    Pulmonary edema cardiac cause (HCC) I50.1    COPD with acute exacerbation (HCC) J44.1    Respiratory failure requiring intubation (HCC) J96.90        RECOMMENDATIONS:   · Resp: ABG c/w respiratory acidosis (7.29/49/204/23.4/100%) on Fi02 100%, PEEP 5. Vent adjusted. Titrate FiO2/ supp O2 for SpO2 >90%. Daily PCXR & ABG while intubated. · AECOPD: Continue ABX (Zithromax/Rocephin), Steroids (Solumedrol 40 mg IVP BID), Nebs (Pulmicort BID & Duo-neb q-4 hours)  · I/D: Afebrile. WBC WNL's.  BC & Sputum Cx ordered. U/a negative. Continue ABX (Zithromax & Rocephin) until Cx's with final results. Will check LA now. · Hem/Onc: Hct / PLT's stable (33.6 & 295 K). Will check Coags now. Add SQ Heparin for DVT Prophylaxis. Daily CBC  · CVS: Stable. Tele = NSR. No gtt's. Check CE's x 24 hours in addition to BNP daily. · Metabolic: Daily BMP; monitor e-lytes; replace PRN  · Renal: I > O's.  Cr 1.49. Trend Renal indices; Auto-Diuresis, Frye to BSD  · Endocrine: POC Glucose q6  · GI: NPO. Pepcid IVP for SUP. Zofran PRN for N/V   · Musc/Skin: No acute issues, wound care  · Neuro: Sedated. Change sedatives from Versed / Ketamine gtt's to Precedex & Fentanyl gtt's. RASS Goal 0 to -1  · Fluids: Normosol gtt at 75 cc /hr   · Code Status: FULL     Best Practice:  · Sepsis Bundle per Hospital Protocol  · Glycemic control; avoid Hypoglycemia  · IHI ICU Bundles:  ·  Frye Bundle Followed and Vent Bundle Followed, Vent Day 0    · Mech Vent patients/ Pulmonary pts:   · VAP bundle, Aim to keep peak plateau pressure 53-72LS H2O  · Aspiration Precautions - HOB >30'  · Daily sedation holiday as indicated  · SBT as tolerated/appropriate  · Stress ulcer prophylaxis.  Pepcid IVP · DVT prophylaxis. SQ Heparin  · Need for Lines, soto assessed. · Restraints need. · Palliative care evaluation. Subjective/History: This patient has been seen and evaluated at the request of Dr. Sapna Varela for ICU Admission for Mgmt of MV.    10/19/19    Patient is a 81 y.o. female with PMH Significant for CAD, CMO, DM, HTN, Renal Insufficiency, HLD, COPD, & Former smoker whom presented to [facility] ED this evening with C/o SOB x 1 week. Per notes, she stated that her shortness of breath would cause her to become dizzy & 2 days prior she actually had an syncopal episode. Denies any head trauma. She stated she has been compliant with her COPD inhaler & this would result in intermittent relief of her shortness of breath. She denied any CP. Work-up in ED included CT imaging of head, PCXR, & labs. Labs significant for elevated D-dimer (1.09), Cr 1.49, BNP 1645. Head CT negative. PCXR with No PTX & Volume overloaded appearance with Sm. B/L Pleural effusion's (> R). While in ED, patient used the commode & after developed shortness of breath. Placed on N/c with improvement in 02 sat's & decreased shortness of breath. While patient was receiving Steroids (for AECOPD S/Sx's), patient developed respiratory distress resulting in arrest.  Patient was intubated  & [facility] consulted for ICU Admission & MV Mgmt. In addition, CTA Chest performed to R/o PE.  CTA Confirmed no PE, however B/L Lung consolidation c/w PNA with element of fluid overload. Based on CTA results, Pulmonary edema & pulmonary infiltrates identified by Dr. Pravin Robert. D/w [facility] ED Provider & lasix IVP given. Upon arrival to ICU, patient sedated & intubated. Will obtain BC's & Sputum Cx's. Will begin AECOPD regimen, to include ABX, steroids, & Nebs. Will check serial CE's & BNP daily. The patient is critically ill and can not provide additional history due to Ventilated.        Past Medical History:   Diagnosis Date    Abnormal WBC count 5/17/2016    Bilateral shoulder pain 9/27/2016    Chondromalacia of both patellae     COPD (chronic obstructive pulmonary disease) ([facility] 9/2015    mild-mod dz; Dr Zach Luo    Diabetes [facility]) 2013    Diabetic eye exam ([facility] 2016    Dilated cardiomyopathy ([facility]     Dyslipidemia     Gout     Heart attack ([facility]     Heart attack ([facility]     History of echocardiogram 11/14/2014    Mild LVE. EF 40%. Mild, diffuse hypk. Mild LAE. Mild MR.      Hypercholesteremia 1/08/14    Hypertension 2000    Noncompliance with medications 2/16/2016    Obesity     Orthostatic hypotension 5/17/2016    Osteoarthritis of both knees     Pain management 04/01/2016    Dr. Jeremi Perez Popliteal cyst, bilateral      Vitamin D deficiency 10/16/14        Past Surgical History:   Procedure Laterality Date    HX HEART CATHETERIZATION      HX TUBAL LIGATION          Prior to Admission medications    Medication Sig Start Date End Date Taking? Authorizing Provider   tizanidine HCl (TIZANIDINE PO) Take  by mouth. Yes Tg Nielsen MD   furosemide (LASIX) 40 mg tablet Take 1 Tab by mouth two (2) times a day. 10/11/19  Yes Nic Winkler MD   carvedilol (COREG) 6.25 mg tablet Take 1 Tab by mouth two (2) times daily (with meals). 10/11/19  Yes Nic Winkler MD   albuterol (PROVENTIL HFA, VENTOLIN HFA, PROAIR HFA) 90 mcg/actuation inhaler Take 2 Puffs by inhalation every four (4) hours as needed for Wheezing. 9/30/19  Yes Kamar Zavala MD   sacubitril-valsartan (ENTRESTO) 97 mg/103 mg tablet Take 1 Tab by mouth two (2) times a day. 7/19/19  Yes Kendra Rabago NP   aspirin 81 mg chewable tablet Take 2 Tabs by mouth daily. 7/19/19  Yes Kendra Rabago NP   raNITIdine (ZANTAC) 150 mg tablet Take 1 Tab by mouth two (2) times a day. 7/12/18  Yes Sherlyn RED NP   atorvastatin (LIPITOR) 20 mg tablet Take 1 Tab by mouth nightly.   Patient taking differently: Take 20 mg by mouth two (2) times a day. 5/23/18  Yes Davina Adamson MD   insulin detemir (LEVEMIR) 100 unit/mL injection 15 units daily for diabetes 7/20/17  Yes Iqra Irizarry NP   desloratadine (CLARINEX) 5 mg tablet Take 1 Tab by mouth daily as needed for Allergies. 4/26/17  Yes Iqra Irizarry NP   mometasone (NASONEX) 50 mcg/actuation nasal spray 2 Sprays by Both Nostrils route daily as needed. For allergies 4/26/17  Yes Marci Irizarry NP   umeclidinium-vilanterol (ANORO ELLIPTA) 62.5-25 mcg/actuation inhaler Take 1 Puff by inhalation daily. For COPD 4/26/17  Yes Marci Irizarry NP   ferrous sulfate 325 mg (65 mg iron) tablet Take 1 Tab by mouth two (2) times a day.  Indications: IRON DEFICIENCY ANEMIA 10/22/15  Yes Marci Irizarry NP       Current Facility-Administered Medications   Medication Dose Route Frequency    heparin (porcine) injection 5,000 Units  5,000 Units SubCUTAneous Q8H    sodium chloride (NS) flush 5-40 mL  5-40 mL IntraVENous Q8H    electrolyte-r (NORMOSOL R) infusion   IntraVENous CONTINUOUS    insulin lispro (HUMALOG) injection   SubCUTAneous Q6H    fentaNYL (PF) 900 mcg/30 ml infusion soln  0-200 mcg/hr IntraVENous TITRATE    dexmedeTOMidine (PRECEDEX) 400 mcg in 0.9% sodium chloride 100 mL infusion  0.2-1.5 mcg/kg/hr IntraVENous TITRATE    budesonide (PULMICORT) 500 mcg/2 ml nebulizer suspension  500 mcg Nebulization BID RT    albuterol-ipratropium (DUO-NEB) 2.5 MG-0.5 MG/3 ML  3 mL Nebulization Q4H RT    methylPREDNISolone (PF) (SOLU-MEDROL) injection 40 mg  40 mg IntraVENous Q12H    famotidine (PF) (PEPCID) 20 mg in sodium chloride 0.9% 10 mL injection  20 mg IntraVENous Q24H    albuterol-ipratropium (DUO-NEB) 2.5 mg-0.5 mg/3 ml nebulizer solution        propofol (DIPRIVAN) infusion  0-50 mcg/kg/min IntraVENous TITRATE    ketamine (KETALAR) 500 mg in 0.9% sodium chloride 500 mL infusion  0.05-0.4 mg/kg/hr IntraVENous TITRATE    midazolam (VERSED) 100 mg in 0.9% sodium chloride 100 mL infusion  0-10 mg/hr IntraVENous TITRATE    cefTRIAXone (ROCEPHIN) 2 g in sterile water (preservative free) 20 mL IV syringe  2 g IntraVENous Q24H    azithromycin (ZITHROMAX) 500 mg in  mL  500 mg IntraVENous Q24H       No Known Allergies     Social History     Tobacco Use    Smoking status: Former Smoker     Packs/day: 0.25     Years: 48.00     Pack years: 12.00     Types: Cigarettes     Last attempt to quit: 2018     Years since quittin.4    Smokeless tobacco: Never Used   Substance Use Topics    Alcohol use: No     Alcohol/week: 0.0 standard drinks        Family History   Problem Relation Age of Onset    Diabetes Mother     Hypertension Mother     Hypertension Father     Kidney Disease Maternal Aunt 50        Dialysis          Review of Systems:  A comprehensive review of systems was negative except for that written in the HPI. Objective:   Vital Signs:    Visit Vitals  BP (!) 162/95 (BP 1 Location: Left arm, BP Patient Position: At rest)   Pulse 89   Temp 98.1 °F (36.7 °C)   Resp 17   Ht 5' 4\" (1.626 m)   Wt 101.2 kg (223 lb)   SpO2 94%   BMI 38.28 kg/m²       O2 Device: Endotracheal tube, Ventilator   O2 Flow Rate (L/min): 2 l/min   Temp (24hrs), Av.1 °F (36.7 °C), Min:98.1 °F (36.7 °C), Max:98.1 °F (36.7 °C)       Intake/Output:   Last shift:      10/18 1901 - 10/19 0700  In: 2000 [I.V.:2000]  Out: 1500 [Urine:1500]  Last 3 shifts: No intake/output data recorded.     Intake/Output Summary (Last 24 hours) at 10/19/2019 0139  Last data filed at 10/19/2019 0002  Gross per 24 hour   Intake 2000 ml   Output 1500 ml   Net 500 ml       Ventilator Settings:  Mode Rate Tidal Volume Pressure FiO2 PEEP   Assist control, VC+   500 ml    50 % 5 cm H20     Peak airway pressure: 24 cm H2O    Minute ventilation: 8.73 l/min      ARDS network Guidelines:   Lung protective strategy and Plateau  Pressure goal < 30 cm H2O goals  Oxygenation Goals PaO2 55-80 mm Hg or SaO2 88-95%  PH goal 7. 30-7.45    VAP bundle:  Reviewed. Kassandra tube to suction at 20-30 cm Hg. Maintain Fergus tube with 5-10ml air every 4 hours  Routine oral care every 4 hours  Elevation of head > 45 degree  Daily sedation holiday and SBT evaluation starting at 6.00am.    Physical Exam:     General:  Intubated/Sedated. VSS. NAD   Head:  Normocephalic, without obvious abnormality, atraumatic. Eyes:  Conjunctivae/corneas clear. PERRL   Nose: Nares normal. Septum midline. Mucosa normal. No drainage or sinus tenderness. Throat: Lips, mucosa, and tongue normal. Teeth and gums normal.   Neck: Supple, symmetrical, trachea midline, no adenopathy, no carotid bruit and no JVD. Lungs:   Symmetrical chest rise. BS = CTAB, decreased bases with few expiratory wheezes. No wheezes/rhonchi/rales noted. Heart:  RRR, S1, S2 normal, no m/r/g   Abdomen:   Soft, non-tender. Bowel sounds hypoactive. No masses,  No organomegaly. Extremities: Extremities normal, atraumatic, no cyanosis. Trace B/L LE Edema   Pulses: 2+ and symmetric all extremities. Skin: Skin color, texture, turgor normal. No rashes or lesions   Neurologic: Sedated   Devices: + P. IV, ETTube, Frye       Data:     Recent Results (from the past 24 hour(s))   CBC WITH AUTOMATED DIFF    Collection Time: 10/18/19  5:15 PM   Result Value Ref Range    WBC 6.6 4.6 - 13.2 K/uL    RBC 3.89 (L) 4.20 - 5.30 M/uL    HGB 9.8 (L) 12.0 - 16.0 g/dL    HCT 33.6 (L) 35.0 - 45.0 %    MCV 86.4 74.0 - 97.0 FL    MCH 25.2 24.0 - 34.0 PG    MCHC 29.2 (L) 31.0 - 37.0 g/dL    RDW 17.8 (H) 11.6 - 14.5 %    PLATELET 807 333 - 967 K/uL    MPV 9.8 9.2 - 11.8 FL    NEUTROPHILS 58 40 - 73 %    LYMPHOCYTES 32 21 - 52 %    MONOCYTES 8 3 - 10 %    EOSINOPHILS 2 0 - 5 %    BASOPHILS 0 0 - 2 %    ABS. NEUTROPHILS 3.9 1.8 - 8.0 K/UL    ABS. LYMPHOCYTES 2.1 0.9 - 3.6 K/UL    ABS. MONOCYTES 0.5 0.05 - 1.2 K/UL    ABS. EOSINOPHILS 0.2 0.0 - 0.4 K/UL    ABS.  BASOPHILS 0.0 0.0 - 0.1 K/UL    DF AUTOMATED METABOLIC PANEL, COMPREHENSIVE    Collection Time: 10/18/19  5:15 PM   Result Value Ref Range    Sodium 143 136 - 145 mmol/L    Potassium 4.1 3.5 - 5.5 mmol/L    Chloride 113 (H) 100 - 111 mmol/L    CO2 24 21 - 32 mmol/L    Anion gap 6 3.0 - 18 mmol/L    Glucose 141 (H) 74 - 99 mg/dL    BUN 21 (H) 7.0 - 18 MG/DL    Creatinine 1.49 (H) 0.6 - 1.3 MG/DL    BUN/Creatinine ratio 14 12 - 20      GFR est AA 42 (L) >60 ml/min/1.73m2    GFR est non-AA 35 (L) >60 ml/min/1.73m2    Calcium 9.1 8.5 - 10.1 MG/DL    Bilirubin, total 0.5 0.2 - 1.0 MG/DL    ALT (SGPT) 13 13 - 56 U/L    AST (SGOT) 8 (L) 10 - 38 U/L    Alk.  phosphatase 130 (H) 45 - 117 U/L    Protein, total 6.9 6.4 - 8.2 g/dL    Albumin 3.2 (L) 3.4 - 5.0 g/dL    Globulin 3.7 2.0 - 4.0 g/dL    A-G Ratio 0.9 0.8 - 1.7     CARDIAC PANEL,(CK, CKMB & TROPONIN)    Collection Time: 10/18/19  5:15 PM   Result Value Ref Range    CK 60 26 - 192 U/L    CK - MB <1.0 <3.6 ng/ml    CK-MB Index  0.0 - 4.0 %     CALCULATION NOT PERFORMED WHEN RESULT IS BELOW LINEAR LIMIT    Troponin-I, QT <0.02 0.0 - 0.045 NG/ML   D DIMER    Collection Time: 10/18/19  5:15 PM   Result Value Ref Range    D DIMER 1.09 (H) <0.46 ug/ml(FEU)   NT-PRO BNP    Collection Time: 10/18/19  5:15 PM   Result Value Ref Range    NT pro-BNP 1,645 (H) 0 - 900 PG/ML   EKG, 12 LEAD, INITIAL    Collection Time: 10/18/19  5:16 PM   Result Value Ref Range    Ventricular Rate 92 BPM    Atrial Rate 92 BPM    P-R Interval 154 ms    QRS Duration 76 ms    Q-T Interval 372 ms    QTC Calculation (Bezet) 460 ms    Calculated P Axis 57 degrees    Calculated R Axis 12 degrees    Calculated T Axis 77 degrees    Diagnosis       Normal sinus rhythm  Normal ECG  When compared with ECG of 30-SEP-2019 16:58,  No significant change was found     URINALYSIS W/ RFLX MICROSCOPIC    Collection Time: 10/18/19  7:34 PM   Result Value Ref Range    Color YELLOW      Appearance CLEAR      Specific gravity 1.018 1.005 - 1.030      pH (UA) 5.5 5.0 - 8.0      Protein 100 (A) NEG mg/dL    Glucose NEGATIVE  NEG mg/dL    Ketone NEGATIVE  NEG mg/dL    Bilirubin NEGATIVE  NEG      Blood NEGATIVE  NEG      Urobilinogen 1.0 0.2 - 1.0 EU/dL    Nitrites NEGATIVE  NEG      Leukocyte Esterase NEGATIVE  NEG     URINE MICROSCOPIC ONLY    Collection Time: 10/18/19  7:34 PM   Result Value Ref Range    WBC 0 to 3 0 - 4 /hpf    Epithelial cells 3+ 0 - 5 /lpf    Bacteria 2+ (A) NEG /hpf   LEGIONELLA PNEUMOPHILA AG, URINE    Collection Time: 10/18/19  7:34 PM   Result Value Ref Range    Legionella Ag, urine NEGATIVE  NEG     STREP PNEUMO AG, URINE    Collection Time: 10/18/19  7:34 PM   Result Value Ref Range    Strep pneumo Ag, urine NEGATIVE  NEG     EKG, 12 LEAD, SUBSEQUENT    Collection Time: 10/18/19  9:01 PM   Result Value Ref Range    Ventricular Rate 103 BPM    Atrial Rate 103 BPM    P-R Interval 148 ms    QRS Duration 80 ms    Q-T Interval 372 ms    QTC Calculation (Bezet) 487 ms    Calculated P Axis 77 degrees    Calculated R Axis 66 degrees    Calculated T Axis 88 degrees    Diagnosis       Sinus tachycardia  Otherwise normal ECG  When compared with ECG of 18-OCT-2019 17:16,  No significant change was found     POC G3    Collection Time: 10/19/19  1:25 AM   Result Value Ref Range    Device: VENT      FIO2 (POC) 100 %    pH (POC) 7.285 (L) 7.35 - 7.45      pCO2 (POC) 49.3 (H) 35.0 - 45.0 MMHG    pO2 (POC) 204 (H) 80 - 100 MMHG    HCO3 (POC) 23.4 22 - 26 MMOL/L    sO2 (POC) 100 (H) 92 - 97 %    Base deficit (POC) 3 mmol/L    Mode ASSIST CONTROL      Tidal volume 500 ml    Set Rate 12 bpm    PEEP/CPAP (POC) 5.0 cmH2O    Allens test (POC) YES      Inspiratory Time 1.25 sec    Total resp.  rate 15      Site RIGHT RADIAL      Specimen type (POC) ARTERIAL      Performed by Mohini Ureña     Volume control plus YES             Recent Labs     10/19/19  0125   FIO2I 100   HCO3I 23.4   PCO2I 49.3*   PHI 7.285*   PO2I 204*       Telemetry:normal sinus rhythm    Imaging:  I have personally reviewed the patients radiographs and have reviewed the reports:    CXR 10/18/2019: (my read): No PTX. ETTube high.  + Volume overloaded     PCXR 10/19/2019 : Pending    CTA Chest 10/18/2019:  IMPRESSION[de-identified]     1. Negative for acute PE.  -Enlarged main pulmonary artery as can be seen with pulmonary arterial  hypertension; appearance as before     2. Ill-defined bilateral lung consolidation, likely pneumonia. There is an  element of fluid overload with lung edema as well as small pleural effusions            Victor Manuel García  10/19/19  Pulmonary, Critical Care Medicine  [facility]         I saw and evaluated the patient, performing the key elements of the service. I discussed the findings, assessment and plan with the PA and agree with the PA's findings and plan as documented in the PA's note. All edits and changes made above or are mentioned in my addendum. Total of 84 min critical care time spent at bedside during the course of care providing evaluation,management and care decisions and ordering appropriate treatment related to critical care problems exclusive of procedures. The reason for providing this level of medical care for this critically ill patient was due a critical illness that impaired one or more vital organ systems such that there was a high probability of imminent or life threatening deterioration in the patients condition. This care involved high complexity decision making to assess, manipulate, and support vital system functions, to treat this degree vital organ system failure and to prevent further life threatening deterioration of the patients condition. 73-year-old female with a past medical history CAD, cardiomyopathy, diabetes, hypertension, CKD, COPD, presented to [facility] ER with complaints of worsening shortness of breath for about 1 week.   Patient was noted to be coming dizzy for 2 days prior, and had a syncopal event at home, denying any head trauma. Patient reported being compliant with her COPD inhalers, with intermittent shortness of breath relief with PRN albuterol. Patient denied any chest pain. On the ER, patient had a CT head which was negative for ICH, chest x-ray showed bilateral infiltrates, patient had elevated d-dimer so CTA PE protocol was performed which was negative for PE but showed diffuse groundglass opacities and interlobular septal thickening consistent with pulmonary edema, as well as some patchy infiltrates, suspicious for multifocal pneumonia. When patient returned from radiology, patient decompensated despite additional bronchodilators and steroids, so patient was intubated in the ER and transferred to our ICU. Upon arrival to our ICU, patient was inadequately sedated due to lack of proper sedation at [facility] ER, patient was given propofol, but resulted in hypotension. Patient was given PRN Dilaudid and ketamine since fentanyl was unavailable. Transition the patient to fentanyl GTT and Precedex drip. Due to multifocal infiltrates, obtain sputum aspirate, sent for procalcitonin, urine Legionella and strep antigens negative, flu PCR negative, started patient on antibiotics with ceftriaxone and azithromycin for community acquired pneumonia. Patient was also started on Solu-Medrol and scheduled duo nebs every 4 hours for COPD exacerbation. Given patient's diffuse groundglass opacities with interlobular septal thickening, and elevated NT proBNP to 1600, will obtain a transthoracic echo, and likely diurese patient for pulmonary edema, consult Cardiology (primary cardiologist Dr. Conner Fernández). Pt also has CKD, maybe CARA on CKD, consulted Nephrology with assistance to avoid overdiuresis. Restarted Coreg at half dose. Will continue supportive care including start tube feeds, glucerna.        Simon Narayanan MD/MPH     Pulmonary, Critical Care Medicine  [facility]

## 2019-10-19 NOTE — ROUTINE PROCESS
Bedside shift change report given to [facility] (oncoming nurse) by Geno Martinez RN (offgoing nurse). Report included the following information SBAR, ED Summary, Procedure Summary, Intake/Output, MAR, Recent Results and Cardiac Rhythm NSR.

## 2019-10-19 NOTE — CONSULTS
[facility], [address] [place] [address]., [address], [place] [address] [address]  Fax: [phone]  Cardiology Consult Note   Admission Date & Time  10/18/2019  5:10 PM    Requesting Physician:     Reason for Consult: dyspnea    HPI: Name:     Davin English          Age:         81 y.o. Gender:   female          Ethnicity:  African American    Ms. Geraldina Aase admitted 10/18/189 with c/o one week history of dyspnea and syncope. The witness reports no head trauma. The patient is currently intubated and cardiology consult obtained. Ms. Geraldina Aase denies c/o chest pain. FiO2 61% PEEP 5. Telemetry: SR. HR 70. The clinical work up including EKG showed SR HR 92 No ST T change; repeat EKG showed ST  NDST change; CXR: Stable bilateral infiltrates may relate to edema or infection; Laboratory: peak CK 85 MB 1.2 Troponin < 0.02 X 3 K 4.3 BUN 20 Cr 1.53 Hgb 10.5 Hct 35 Plt 308 WBC 9.5; CT Chest: 1. Negative for acute PE; CT Head: No acute findings.         Current Medications:  Current Facility-Administered Medications   Medication Dose Route Frequency    heparin (porcine) injection 5,000 Units  5,000 Units SubCUTAneous Q8H    sodium chloride (NS) flush 5-40 mL  5-40 mL IntraVENous Q8H    sodium chloride (NS) flush 5-40 mL  5-40 mL IntraVENous PRN    electrolyte-r (NORMOSOL R) infusion   IntraVENous CONTINUOUS    acetaminophen (TYLENOL) tablet 650 mg  650 mg Oral Q6H PRN    ondansetron (ZOFRAN) injection 4 mg  4 mg IntraVENous Q6H PRN    insulin lispro (HUMALOG) injection   SubCUTAneous Q6H    glucose chewable tablet 16 g  4 Tab Oral PRN    glucagon (GLUCAGEN) injection 1 mg  1 mg IntraMUSCular PRN    dextrose (D50W) injection syrg 12.5-25 g  25-50 mL IntraVENous PRN    fentaNYL (PF) 900 mcg/30 ml infusion soln  0-200 mcg/hr IntraVENous TITRATE    dexmedeTOMidine (PRECEDEX) 400 mcg in 0.9% sodium chloride 100 mL infusion  0.2-1.5 mcg/kg/hr IntraVENous TITRATE    budesonide (PULMICORT) 500 mcg/2 ml nebulizer suspension  500 mcg Nebulization BID RT    albuterol-ipratropium (DUO-NEB) 2.5 MG-0.5 MG/3 ML  3 mL Nebulization Q4H RT    methylPREDNISolone (PF) (SOLU-MEDROL) injection 40 mg  40 mg IntraVENous Q12H    famotidine (PF) (PEPCID) 20 mg in sodium chloride 0.9% 10 mL injection  20 mg IntraVENous Q24H    albuterol-ipratropium (DUO-NEB) 2.5 mg-0.5 mg/3 ml nebulizer solution        carvedilol (COREG) tablet 3.125 mg  3.125 mg Oral BID WITH MEALS    cefTRIAXone (ROCEPHIN) 2 g in sterile water (preservative free) 20 mL IV syringe  2 g IntraVENous Q24H    azithromycin (ZITHROMAX) 500 mg in  mL  500 mg IntraVENous Q24H          Allergies:  No Known Allergies      Past History:  Past Medical History:   Diagnosis Date    Abnormal WBC count 5/17/2016    Bilateral shoulder pain 9/27/2016    Chondromalacia of both patellae     COPD (chronic obstructive pulmonary disease) ([facility] [address] 9/2015    mild-mod dz; Dr Suzie Degroot    Diabetes [facility]) 2013    Diabetic eye exam ([facility] [facility] 2016    Dilated cardiomyopathy ([facility] [facility]     Dyslipidemia     Gout     Heart attack ([facility]     Heart attack ([facility]     History of echocardiogram 11/14/2014    Mild LVE. EF 40%. Mild, diffuse hypk. Mild LAE.   Mild MR.      Hypercholesteremia 1/08/14    Hypertension 2000    Noncompliance with medications 2/16/2016    Obesity     Orthostatic hypotension 5/17/2016    Osteoarthritis of both knees     Pain management 04/01/2016    Dr. Anderson Dry Popliteal cyst, bilateral      Vitamin D deficiency 10/16/14       Past Surgical History:   Procedure Laterality Date    HX HEART CATHETERIZATION      HX TUBAL LIGATION         Family History   Problem Relation Age of Onset    Diabetes Mother     Hypertension Mother     Hypertension Father     Kidney Disease Maternal Aunt 48        Dialysis       Social History     Socioeconomic History    Marital status: LEGALLY      Spouse name: Not on file    Number of children: 3    Years of education: Not on file    Highest education level: Not on file   Occupational History    Occupation: retired   Tobacco Use    Smoking status: Former Smoker     Packs/day: 0.25     Years: 48.00     Pack years: 12.00     Types: Cigarettes     Last attempt to quit: 2018     Years since quittin.4    Smokeless tobacco: Never Used   Substance and Sexual Activity    Alcohol use: No     Alcohol/week: 0.0 standard drinks    Drug use: No    Sexual activity: Yes     Partners: Male   Other Topics Concern     Service No    Blood Transfusions No    Caffeine Concern No    Occupational Exposure No    Hobby Hazards No    Sleep Concern No    Stress Concern No    Weight Concern No    Special Diet No    Back Care No    Exercise No    Bike Helmet No    Seat Belt Yes    Self-Exams Yes         Review of Symptoms:   Unable to obtain full history. Vital Signs:    Visit Vitals  /59   Pulse 70   Temp 98.2 °F (36.8 °C)   Resp 16   Ht 5' 4\" (1.626 m)   Wt 103.4 kg (228 lb)   SpO2 96%   BMI 39.14 kg/m²         Physical Assessment:    General: No acute distress. Skin: Warm and dry. HEENT: Head is normocephalic and atraumatic. Neck: Supple. No thyromegaly or jugular venous distension. Chest: Symmetric    Back: No presacral edema. Lungs: Clear anterior. Heart:  S1 and S2 are normal. II/VIS M. Abdomen: Soft. Tender. Genitourinary: Deferred. Extremities: No edema. Neurologic: Cranial nerves II-XII are grossly intact. Musculoskeletal: No obvious muscle or joint deformities. Psychiatric: Pt. Is awake, alert and oriented X3.  Affect seems normal.        Laboratory Data:    Labs: Results:       Chemistry Recent Labs     10/19/19  0125 10/18/19  1715   GLU  --  141*   NA  --  143   K  --  4.1   CL  --  113*   CO2  --  24   BUN  --  21*   CREA  --  1.49*   CA  --  9.1   PHOS 3.7  --    AGAP  --  6   BUCR  --  14   AP  --  130* TP  --  6.9   ALB  --  3.2*   GLOB  --  3.7   AGRAT  --  0.9      CBC w/Diff Recent Labs     10/19/19  0125 10/18/19  1715   WBC 9.5 6.6   RBC 4.16* 3.89*   HGB 10.5* 9.8*   HCT 35.0 33.6*    295   GRANS 91* 58   LYMPH 8* 32   EOS 0 2      Cardiac Enzymes Recent Labs     10/19/19  0836 10/18/19  1715   CPK 77 60   CKND1 1.6 CALCULATION NOT PERFORMED WHEN RESULT IS BELOW LINEAR LIMIT      Coagulation Recent Labs     10/19/19  0125   PTP 13.2   INR 1.0   APTT 28.5       Lipid Panel Lab Results   Component Value Date/Time    Cholesterol, total 152 10/10/2019 12:00 AM    HDL Cholesterol 46 10/10/2019 12:00 AM    LDL, calculated 86 10/10/2019 12:00 AM    VLDL, calculated 20 10/10/2019 12:00 AM    Triglyceride 101 10/10/2019 12:00 AM    CHOL/HDL Ratio 5.6 (H) 05/21/2018 06:23 AM      BNP No results for input(s): BNPP in the last 72 hours. Liver Enzymes Recent Labs     10/18/19  1715   TP 6.9   ALB 3.2*   *   SGOT 8*      Digoxin    Thyroid Studies Lab Results   Component Value Date/Time    TSH 1.01 05/21/2018 06:23 AM          8/7/19 Echocardiogram:  · Left Ventricle: Normal cavity size and wall thickness. Mild systolic dysfunction. Estimated left ventricular ejection fraction is 46 - 50%. Abnormal left ventricular wall motion. Moderate (grade 2) left ventricular diastolic dysfunction. · Left Atrium: Moderately dilated left atrium. · Aortic Valve: Aortic valve sclerosis. · Mitral Valve: Mitral valve thickening. Moderate to severe mitral valve regurgitation. · Tricuspid Valve: Mild tricuspid valve regurgitation is present. Pulmonary arterial systolic pressure is 33.3 mmHg. Moderate pulmonary hypertension. · Pulmonic Valve: Mild pulmonic valve regurgitation is present. · IVC/Hepatic Veins: Severely elevated central venous pressure (15+ mmHg); IVC diameter is larger than 21 mm and collapses less than 50% with respiration.          Impressions:   Respiratory Failure s/p Mechanical Ventilation  Syncope · CT Head: No acute findings. Coronary artery disease Neg ACS  · 8/28/19 NST: Left ventricular function post-stress was abnormal. Wall motion was abnormal at stress as described below in the wall scoring diagram. Calculated ejection fraction is 44%. The TID ratio is 0.94. Left ventricular perfusion is abnormal.   Perfusion defect 1: There is a left ventricular perfusion defect that is moderate in size with a severe reduction in uptake affecting the mid-apical anterior and apex location(s) that is non-reversible. There is abnormal wall motion in the defect area. Viability in the area is poor. The defect appears to be infarction. Perfusion defect was visually present without quantitative evidence. · s/p YADIRA to proximal LAD with RESOLUTE stent 5/20/2018.  Acute stent thrombosis shortly after initial PCI on 5/20/2018 requiring emergency intubation for pulmonary edema and repeat cardiac cath and additional PCI/YADIRA to prox LAD.   Chronic Heart failure with mild systolic dysfunction exacerbation-improved very well clinically. Hx of Ischemic cardiomyopathy- with EF 35% on echo 5/21/2018  with elevated pulmonary pressures at 44mm Hg- now with mild systolic Dysfunction Recent Echo 8/7/19 showed EF% 45%-50%  Hypertension- stable  on Entresto and bb   DM Type II- medications per medical team   Dyslipidemia- on Lipitor 20 mg. LDL 80 on 8/19   COPD exacerbation- managed by medical team   Hx of Tobacco abuse with   1 ppd X 50 years- patient quit in 2018  Obesity- weight loss advised  TESSA- uses CPAP at night   Urinary tract infection  Plan:   Telemetry  Carvedilol/Heparin SQ  Repeat Echocardiogram  Carotid PVL  Check TSH/FT4    Thank you for calling. Bebeto Mcgrath, [facility], [address] [place] [address] Office  [phone] Pager  10/19/2019, 1:44 PM

## 2019-10-19 NOTE — ED NOTES
Pt. BP noted to be low at this time, MD notified and diprivan infusion was stopped at this time. MD stated to start on Versed Drip and hold diprivan. Pt tolerated well at this time.

## 2019-10-20 ENCOUNTER — APPOINTMENT (OUTPATIENT)
Dept: GENERAL RADIOLOGY | Age: 69
DRG: 208 | End: 2019-10-20
Attending: PHYSICIAN ASSISTANT
Payer: MEDICARE

## 2019-10-20 LAB
ANION GAP SERPL CALC-SCNC: 10 MMOL/L (ref 3–18)
ARTERIAL PATENCY WRIST A: YES
BASE DEFICIT BLD-SCNC: 4 MMOL/L
BASOPHILS # BLD: 0 K/UL (ref 0–0.1)
BASOPHILS NFR BLD: 0 % (ref 0–2)
BDY SITE: ABNORMAL
BNP SERPL-MCNC: 1462 PG/ML (ref 0–900)
BODY TEMPERATURE: 99.7
BUN SERPL-MCNC: 30 MG/DL (ref 7–18)
BUN/CREAT SERPL: 19 (ref 12–20)
CA-I SERPL-SCNC: 1.17 MMOL/L (ref 1.12–1.32)
CALCIUM SERPL-MCNC: 8.5 MG/DL (ref 8.5–10.1)
CHLORIDE SERPL-SCNC: 111 MMOL/L (ref 100–111)
CK MB CFR SERPL CALC: NORMAL % (ref 0–4)
CK MB CFR SERPL CALC: NORMAL % (ref 0–4)
CK MB SERPL-MCNC: <1 NG/ML (ref 5–25)
CK MB SERPL-MCNC: <1 NG/ML (ref 5–25)
CK SERPL-CCNC: 120 U/L (ref 26–192)
CK SERPL-CCNC: 162 U/L (ref 26–192)
CO2 SERPL-SCNC: 21 MMOL/L (ref 21–32)
CREAT SERPL-MCNC: 1.61 MG/DL (ref 0.6–1.3)
CREAT UR-MCNC: 20 MG/DL (ref 30–125)
DIFFERENTIAL METHOD BLD: ABNORMAL
EOSINOPHIL # BLD: 0 K/UL (ref 0–0.4)
EOSINOPHIL NFR BLD: 0 % (ref 0–5)
ERYTHROCYTE [DISTWIDTH] IN BLOOD BY AUTOMATED COUNT: 17.7 % (ref 11.6–14.5)
GAS FLOW.O2 O2 DELIVERY SYS: ABNORMAL L/MIN
GAS FLOW.O2 SETTING OXYMISER: 16 BPM
GLUCOSE BLD STRIP.AUTO-MCNC: 181 MG/DL (ref 70–110)
GLUCOSE BLD STRIP.AUTO-MCNC: 182 MG/DL (ref 70–110)
GLUCOSE BLD STRIP.AUTO-MCNC: 189 MG/DL (ref 70–110)
GLUCOSE BLD STRIP.AUTO-MCNC: 205 MG/DL (ref 70–110)
GLUCOSE SERPL-MCNC: 179 MG/DL (ref 74–99)
HCO3 BLD-SCNC: 20.9 MMOL/L (ref 22–26)
HCT VFR BLD AUTO: 29.1 % (ref 35–45)
HGB BLD-MCNC: 8.8 G/DL (ref 12–16)
LYMPHOCYTES # BLD: 0.6 K/UL (ref 0.9–3.6)
LYMPHOCYTES NFR BLD: 7 % (ref 21–52)
MAGNESIUM SERPL-MCNC: 2 MG/DL (ref 1.6–2.6)
MCH RBC QN AUTO: 24.9 PG (ref 24–34)
MCHC RBC AUTO-ENTMCNC: 30.2 G/DL (ref 31–37)
MCV RBC AUTO: 82.2 FL (ref 74–97)
MICROALBUMIN UR-MCNC: 24.2 MG/DL (ref 0–3)
MICROALBUMIN/CREAT UR-RTO: 1210 MG/G (ref 0–30)
MONOCYTES # BLD: 0.4 K/UL (ref 0.05–1.2)
MONOCYTES NFR BLD: 4 % (ref 3–10)
NEUTS SEG # BLD: 8.2 K/UL (ref 1.8–8)
NEUTS SEG NFR BLD: 89 % (ref 40–73)
O2/TOTAL GAS SETTING VFR VENT: 50 %
PCO2 BLD: 34.4 MMHG (ref 35–45)
PEEP RESPIRATORY: 5 CMH2O
PH BLD: 7.39 [PH] (ref 7.35–7.45)
PHOSPHATE SERPL-MCNC: 4.3 MG/DL (ref 2.5–4.9)
PLATELET # BLD AUTO: 332 K/UL (ref 135–420)
PMV BLD AUTO: 9.9 FL (ref 9.2–11.8)
PO2 BLD: 94 MMHG (ref 80–100)
POTASSIUM SERPL-SCNC: 4.5 MMOL/L (ref 3.5–5.5)
RBC # BLD AUTO: 3.54 M/UL (ref 4.2–5.3)
SAO2 % BLD: 97 % (ref 92–97)
SERVICE CMNT-IMP: ABNORMAL
SODIUM SERPL-SCNC: 142 MMOL/L (ref 136–145)
SPECIMEN TYPE: ABNORMAL
T4 FREE SERPL-MCNC: 1.2 NG/DL (ref 0.7–1.5)
TOTAL RESP. RATE, ITRR: 16
TROPONIN I SERPL-MCNC: <0.02 NG/ML (ref 0–0.04)
TROPONIN I SERPL-MCNC: <0.02 NG/ML (ref 0–0.04)
VENTILATION MODE VENT: ABNORMAL
VOLUME CONTROL PLUS IVLCP: YES
VT SETTING VENT: 500 ML
WBC # BLD AUTO: 9.2 K/UL (ref 4.6–13.2)

## 2019-10-20 PROCEDURE — 74011250636 HC RX REV CODE- 250/636: Performed by: PHYSICIAN ASSISTANT

## 2019-10-20 PROCEDURE — 71045 X-RAY EXAM CHEST 1 VIEW: CPT

## 2019-10-20 PROCEDURE — 74011000250 HC RX REV CODE- 250: Performed by: INTERNAL MEDICINE

## 2019-10-20 PROCEDURE — 83880 ASSAY OF NATRIURETIC PEPTIDE: CPT

## 2019-10-20 PROCEDURE — 84439 ASSAY OF FREE THYROXINE: CPT

## 2019-10-20 PROCEDURE — 74011250636 HC RX REV CODE- 250/636: Performed by: INTERNAL MEDICINE

## 2019-10-20 PROCEDURE — 84100 ASSAY OF PHOSPHORUS: CPT

## 2019-10-20 PROCEDURE — 82803 BLOOD GASES ANY COMBINATION: CPT

## 2019-10-20 PROCEDURE — 82962 GLUCOSE BLOOD TEST: CPT

## 2019-10-20 PROCEDURE — 74011250637 HC RX REV CODE- 250/637: Performed by: PHYSICIAN ASSISTANT

## 2019-10-20 PROCEDURE — 77010033678 HC OXYGEN DAILY

## 2019-10-20 PROCEDURE — 74011250637 HC RX REV CODE- 250/637: Performed by: INTERNAL MEDICINE

## 2019-10-20 PROCEDURE — 83735 ASSAY OF MAGNESIUM: CPT

## 2019-10-20 PROCEDURE — 36415 COLL VENOUS BLD VENIPUNCTURE: CPT

## 2019-10-20 PROCEDURE — 85025 COMPLETE CBC W/AUTO DIFF WBC: CPT

## 2019-10-20 PROCEDURE — 74011000250 HC RX REV CODE- 250: Performed by: PHYSICIAN ASSISTANT

## 2019-10-20 PROCEDURE — 94003 VENT MGMT INPAT SUBQ DAY: CPT

## 2019-10-20 PROCEDURE — 80048 BASIC METABOLIC PNL TOTAL CA: CPT

## 2019-10-20 PROCEDURE — 82330 ASSAY OF CALCIUM: CPT

## 2019-10-20 PROCEDURE — 74011636637 HC RX REV CODE- 636/637: Performed by: INTERNAL MEDICINE

## 2019-10-20 PROCEDURE — 94762 N-INVAS EAR/PLS OXIMTRY CONT: CPT

## 2019-10-20 PROCEDURE — 36600 WITHDRAWAL OF ARTERIAL BLOOD: CPT

## 2019-10-20 PROCEDURE — 94400 HC END TIDAL CO2 RESPONSE CURVE: CPT

## 2019-10-20 PROCEDURE — 93005 ELECTROCARDIOGRAM TRACING: CPT

## 2019-10-20 PROCEDURE — 74011636637 HC RX REV CODE- 636/637: Performed by: PHYSICIAN ASSISTANT

## 2019-10-20 PROCEDURE — 5A09457 ASSISTANCE WITH RESPIRATORY VENTILATION, 24-96 CONSECUTIVE HOURS, CONTINUOUS POSITIVE AIRWAY PRESSURE: ICD-10-PCS | Performed by: INTERNAL MEDICINE

## 2019-10-20 PROCEDURE — 65610000006 HC RM INTENSIVE CARE

## 2019-10-20 PROCEDURE — 94640 AIRWAY INHALATION TREATMENT: CPT

## 2019-10-20 PROCEDURE — 82550 ASSAY OF CK (CPK): CPT

## 2019-10-20 RX ORDER — GUAIFENESIN 100 MG/5ML
81 LIQUID (ML) ORAL DAILY
Status: DISCONTINUED | OUTPATIENT
Start: 2019-10-20 | End: 2019-10-27 | Stop reason: HOSPADM

## 2019-10-20 RX ORDER — DEXTROSE 50 % IN WATER (D50W) INTRAVENOUS SYRINGE
25-50 AS NEEDED
Status: DISCONTINUED | OUTPATIENT
Start: 2019-10-20 | End: 2019-10-20 | Stop reason: SDUPTHER

## 2019-10-20 RX ORDER — MAG HYDROX/ALUMINUM HYD/SIMETH 200-200-20
30 SUSPENSION, ORAL (FINAL DOSE FORM) ORAL ONCE
Status: COMPLETED | OUTPATIENT
Start: 2019-10-20 | End: 2019-10-20

## 2019-10-20 RX ORDER — INSULIN LISPRO 100 [IU]/ML
INJECTION, SOLUTION INTRAVENOUS; SUBCUTANEOUS
Status: DISCONTINUED | OUTPATIENT
Start: 2019-10-20 | End: 2019-10-27 | Stop reason: HOSPADM

## 2019-10-20 RX ORDER — PREDNISONE 20 MG/1
40 TABLET ORAL
Status: DISCONTINUED | OUTPATIENT
Start: 2019-10-21 | End: 2019-10-22

## 2019-10-20 RX ORDER — LIDOCAINE HYDROCHLORIDE 20 MG/ML
15 SOLUTION OROPHARYNGEAL ONCE
Status: COMPLETED | OUTPATIENT
Start: 2019-10-20 | End: 2019-10-20

## 2019-10-20 RX ORDER — CARVEDILOL 6.25 MG/1
6.25 TABLET ORAL 2 TIMES DAILY WITH MEALS
Status: DISCONTINUED | OUTPATIENT
Start: 2019-10-20 | End: 2019-10-27 | Stop reason: HOSPADM

## 2019-10-20 RX ORDER — LORAZEPAM 2 MG/ML
0.5 INJECTION INTRAMUSCULAR ONCE
Status: COMPLETED | OUTPATIENT
Start: 2019-10-20 | End: 2019-10-20

## 2019-10-20 RX ORDER — MAGNESIUM SULFATE 100 %
4 CRYSTALS MISCELLANEOUS AS NEEDED
Status: DISCONTINUED | OUTPATIENT
Start: 2019-10-20 | End: 2019-10-20 | Stop reason: SDUPTHER

## 2019-10-20 RX ORDER — FUROSEMIDE 10 MG/ML
40 INJECTION INTRAMUSCULAR; INTRAVENOUS ONCE
Status: COMPLETED | OUTPATIENT
Start: 2019-10-20 | End: 2019-10-20

## 2019-10-20 RX ORDER — PANTOPRAZOLE SODIUM 40 MG/1
40 TABLET, DELAYED RELEASE ORAL ONCE
Status: COMPLETED | OUTPATIENT
Start: 2019-10-20 | End: 2019-10-20

## 2019-10-20 RX ADMIN — PANTOPRAZOLE SODIUM 40 MG: 40 TABLET, DELAYED RELEASE ORAL at 14:58

## 2019-10-20 RX ADMIN — HEPARIN SODIUM 5000 UNITS: 5000 INJECTION INTRAVENOUS; SUBCUTANEOUS at 08:31

## 2019-10-20 RX ADMIN — METHYLPREDNISOLONE SODIUM SUCCINATE 40 MG: 40 INJECTION, POWDER, FOR SOLUTION INTRAMUSCULAR; INTRAVENOUS at 08:32

## 2019-10-20 RX ADMIN — Medication 30 ML: at 08:31

## 2019-10-20 RX ADMIN — WATER 2 G: 1 INJECTION INTRAMUSCULAR; INTRAVENOUS; SUBCUTANEOUS at 22:22

## 2019-10-20 RX ADMIN — IPRATROPIUM BROMIDE AND ALBUTEROL SULFATE 3 ML: .5; 3 SOLUTION RESPIRATORY (INHALATION) at 12:01

## 2019-10-20 RX ADMIN — FAMOTIDINE 20 MG: 10 INJECTION INTRAVENOUS at 01:24

## 2019-10-20 RX ADMIN — Medication 81 MG: at 12:17

## 2019-10-20 RX ADMIN — BUDESONIDE 500 MCG: 0.5 INHALANT RESPIRATORY (INHALATION) at 09:19

## 2019-10-20 RX ADMIN — LIDOCAINE HYDROCHLORIDE 15 ML: 20 SOLUTION ORAL; TOPICAL at 14:57

## 2019-10-20 RX ADMIN — IPRATROPIUM BROMIDE AND ALBUTEROL SULFATE 3 ML: .5; 3 SOLUTION RESPIRATORY (INHALATION) at 00:40

## 2019-10-20 RX ADMIN — IPRATROPIUM BROMIDE AND ALBUTEROL SULFATE 3 ML: .5; 3 SOLUTION RESPIRATORY (INHALATION) at 04:00

## 2019-10-20 RX ADMIN — IPRATROPIUM BROMIDE AND ALBUTEROL SULFATE 3 ML: .5; 3 SOLUTION RESPIRATORY (INHALATION) at 09:19

## 2019-10-20 RX ADMIN — AZITHROMYCIN MONOHYDRATE 500 MG: 500 INJECTION, POWDER, LYOPHILIZED, FOR SOLUTION INTRAVENOUS at 22:38

## 2019-10-20 RX ADMIN — Medication 10 ML: at 05:58

## 2019-10-20 RX ADMIN — Medication 10 ML: at 22:21

## 2019-10-20 RX ADMIN — HEPARIN SODIUM 5000 UNITS: 5000 INJECTION INTRAVENOUS; SUBCUTANEOUS at 16:45

## 2019-10-20 RX ADMIN — IPRATROPIUM BROMIDE AND ALBUTEROL SULFATE 3 ML: .5; 3 SOLUTION RESPIRATORY (INHALATION) at 19:10

## 2019-10-20 RX ADMIN — CARVEDILOL 6.25 MG: 12.5 TABLET, FILM COATED ORAL at 17:32

## 2019-10-20 RX ADMIN — INSULIN LISPRO 4 UNITS: 100 INJECTION, SOLUTION INTRAVENOUS; SUBCUTANEOUS at 06:24

## 2019-10-20 RX ADMIN — INSULIN LISPRO 2 UNITS: 100 INJECTION, SOLUTION INTRAVENOUS; SUBCUTANEOUS at 12:22

## 2019-10-20 RX ADMIN — LORAZEPAM 0.5 MG: 2 INJECTION INTRAMUSCULAR; INTRAVENOUS at 12:23

## 2019-10-20 RX ADMIN — Medication 100 MCG/HR: at 05:52

## 2019-10-20 RX ADMIN — INSULIN LISPRO 2 UNITS: 100 INJECTION, SOLUTION INTRAVENOUS; SUBCUTANEOUS at 22:24

## 2019-10-20 RX ADMIN — FUROSEMIDE 40 MG: 10 INJECTION, SOLUTION INTRAMUSCULAR; INTRAVENOUS at 12:18

## 2019-10-20 RX ADMIN — ACETAMINOPHEN 650 MG: 325 TABLET ORAL at 18:32

## 2019-10-20 RX ADMIN — INSULIN LISPRO 2 UNITS: 100 INJECTION, SOLUTION INTRAVENOUS; SUBCUTANEOUS at 16:33

## 2019-10-20 RX ADMIN — HEPARIN SODIUM 5000 UNITS: 5000 INJECTION INTRAVENOUS; SUBCUTANEOUS at 01:23

## 2019-10-20 RX ADMIN — ALUMINUM HYDROXIDE, MAGNESIUM HYDROXIDE, AND SIMETHICONE 30 ML: 200; 200; 20 SUSPENSION ORAL at 14:58

## 2019-10-20 RX ADMIN — Medication 10 ML: at 14:59

## 2019-10-20 RX ADMIN — BUDESONIDE 500 MCG: 0.5 INHALANT RESPIRATORY (INHALATION) at 19:10

## 2019-10-20 RX ADMIN — IPRATROPIUM BROMIDE AND ALBUTEROL SULFATE 3 ML: .5; 3 SOLUTION RESPIRATORY (INHALATION) at 23:13

## 2019-10-20 RX ADMIN — ACETAMINOPHEN 650 MG: 325 TABLET ORAL at 22:34

## 2019-10-20 RX ADMIN — MIDAZOLAM HYDROCHLORIDE 1 MG: 2 INJECTION, SOLUTION INTRAMUSCULAR; INTRAVENOUS at 02:58

## 2019-10-20 RX ADMIN — CARVEDILOL 3.12 MG: 3.12 TABLET, FILM COATED ORAL at 08:31

## 2019-10-20 NOTE — H&P
[facility]  Pulmonary, Critical Care, and Sleep Medicine    Name: Bobby Adams MRN: [id number]   : 1950 Hospital: [facility]   Date: 10/20/2019        Critical Care Progress Note    This patient has been seen and evaluated at the request of Dr. Angelo Marc for ICU Admission for Mgmt of MV. IMPRESSION:   · Acute Respiratory Failure with Hypoxia:  improving  · Pulmonary Edema: Secondary to CHF exacerbation  · Pulmonary Infiltrates likely secondary to community-acquired pneumonia  · AECOPD  · Acute encephalopathy: Etiology likely secondary to ICU delirium. There may also be a contribution from sepsis.   · Atypical chest pain: EKG unremarkable, obtain troponin, sharp pain, costochondritis versus heartburn  · Systolic and diastolic CHF with exacerbation: TTE shows reduced LVEF, along with concentric hypertrophy, patient also has valvular issues including moderate to severe mitral regurg also likely contributing to CHF exacerbation  · CAD  · COPD  · CMO  · DM  · HTN  · HLD  · Obesity  · Renal Insufficiency   · OA  · Former Smoker     Patient Active Problem List   Diagnosis Code    Cardiomyopathy, dilated ([facility] [address] I42.0    Environmental allergies Z91.09    Diabetes mellitus type 2, insulin dependent ([facility] [address] E11.9, Z79.4    COPD (chronic obstructive pulmonary disease) ([facility] [address] J44.9    Essential hypertension I10    Iron deficiency anemia D50.9    Decreased GFR R94.4    Dyslipidemia, goal LDL below 70 E78.5    Former smoker Z87.891    Osteoarthritis of both knees M17.0    Chronic pain of both knees M25.561, M25.562, G89.29    Chronic pain syndrome G89.4    Primary osteoarthritis of both knees M17.0    Chronic obstructive pulmonary disease (HCC) J44.9    Compliance with medication regimen Z91.89    Bilateral shoulder pain M25.511, M25.512    Elevated alkaline phosphatase level R74.8    Caregiver stress Z63.6    STEMI (ST elevation myocardial infarction) ([facility]) I21.3  Acute pulmonary edema (HCC) J81.0    Coronary artery disease involving native coronary artery of native heart without angina pectoris I25.10    Pulmonary edema J81.1    Chest pain R07.9    SOB (shortness of breath) R06.02    Coronary artery disease involving native coronary artery with unstable angina pectoris (HCC) I25.110    Type 2 diabetes with nephropathy (HCC) E11.21    Severe obesity (BMI 35.0-39. 9) with comorbidity ([facility] [address] E66.01    COPD exacerbation (HCC) J44.1    CAP (community acquired pneumonia) J18.9    Combined systolic and diastolic congestive heart failure (HCC) I50.40    Type 2 diabetes mellitus with hyperglycemia (HCC) E11.65    TESSA on CPAP G47.33, Z99.89    Ischemic cardiomyopathy I25.5    Syncope R55    Flash pulmonary edema (HCC) J81.0    Elevated d-dimer R79.89    Acute on chronic respiratory failure with hypoxia (HCC) J96.21    Pulmonary edema cardiac cause (HCC) I50.1    COPD with acute exacerbation (HCC) J44.1    Respiratory failure requiring intubation ([facility]) J96.90        RECOMMENDATIONS:   · Resp: Extubated patient today, now on 5 L by nasal cannula, RSBI was 36, aspiration precautions, frequent incentive spirometry, will plan for out of bed to chair and PT OT tomorrow. · AECOPD: Continue ABX (Zithromax/Rocephin), steroids changed from Solu-Medrol to prednisone daily--plan for rapid taper, continue nebs (Pulmicort BID & Duo-neb q-4 hours)  · I/D: Afebrile. WBC WNL's.  BC & Sputum Cx ordered. U/a negative. Continue ABX (Zithromax & Rocephin) until Cx's with final results, will plan for 7-day total course of therapy  · Hem/Onc: Continue to trend CBC daily, heparin subcu for DVT prophylaxis  · CVS: Stable. Tele = NSR. No gtt's. Troponin pending. Diuretic given today by nephrology. Cardiology consulted appreciate recs  · Metabolic: Daily BMP; monitor e-lytes; replace PRN  · Renal: I > O's.  Cr 1.49.   Trend Renal indices; given Lasix today, nephrology following. · Endocrine: POC Glucose q6  · GI:  Speech and swallow eval, Pepcid IVP for SUP. Zofran PRN for N/V. Will give GI cocktail and one-time dose of Protonix for heartburn  · Musc/Skin: No acute issues, wound care. PT/OT, out of bed to chair  · Neuro: Delirious, frequent neuro checks  · Code Status: FULL     Best Practice:  · Sepsis Bundle per Hospital Protocol  · Glycemic control; avoid Hypoglycemia  · IHI ICU Bundles:  ·  Soto Bundle Followed and Vent Bundle Followed, Vent Day 0    · Mech Vent patients/ Pulmonary pts:   · VAP bundle, Aim to keep peak plateau pressure 19-54BU H2O  · Aspiration Precautions - HOB >30'  · Daily sedation holiday as indicated  · SBT as tolerated/appropriate  · Stress ulcer prophylaxis. Pepcid IVP   · DVT prophylaxis. SQ Heparin  · Need for Lines, soto assessed. · Restraints need. · Palliative care evaluation. Subjective/History:   10/20/19  Patient seen and examined at bedside. No acute events overnight. Patient this morning was doing well from respiratory standpoint, SBT showed RSBI of 36, patient extubated without event. Postextubation, patient is delirious, requesting for random medications including aspirin, and asking for multiple family members. Patient otherwise denies complaints      Int Hx:  Patient is a 81 y.o. female with PMH Significant for CAD, CMO, DM, HTN, Renal Insufficiency, HLD, COPD, & Former smoker whom presented to [facility] ED this evening with C/o SOB x 1 week. Per notes, she stated that her shortness of breath would cause her to become dizzy & 2 days prior she actually had an syncopal episode. Denies any head trauma. She stated she has been compliant with her COPD inhaler & this would result in intermittent relief of her shortness of breath. She denied any CP. Work-up in ED included CT imaging of head, PCXR, & labs. Labs significant for elevated D-dimer (1.09), Cr 1.49, BNP 1645. Head CT negative.   PCXR with No PTX & Volume overloaded appearance with Sm. B/L Pleural effusion's (> R). While in ED, patient used the commode & after developed shortness of breath. Placed on N/c with improvement in 02 sat's & decreased shortness of breath. While patient was receiving Steroids (for AECOPD S/Sx's), patient developed respiratory distress resulting in arrest.  [facility] was int[facility]ated  & [facility] & MV Mgmt. In addition, CTA Chest performed to R/o PE.  CTA Confirmed no PE, however B/L Lung consolidation c/w PNA with element of fluid overload. Based on CTA results, Pulmonary edema & pulmonary infiltrates identified by Dr. Yesica Arita. D/w HBV ED Provider & lasix IVP given. Upon arrival to ICU, patient sedated & intubated. Will obtain BC's & Sputum Cx's. Will begin AECOPD regimen, to include ABX, steroids, & Nebs. Will check serial CE's & BNP daily. The patient is critically ill and can not provide additional history due to Ventilated. Past Medical History:   Diagnosis Date    Abnormal WBC count 5/17/2016    Bilateral shoulder pain 9/27/2016    Chondromalacia of both patellae     COPD (chronic obstructive pulmonary disease) ([facility] [address] 9/2015    mild-mod dz; Dr Sumit Wiseman    Diabetes [facility]) 2013    Diabetic eye exam ([facility] [address] 2016    Dilated cardiomyopathy ([facility] [address]     Dyslipidemia     Gout     Heart attack ([facility] [address]     Heart attack ([facility] [address]     History of echocardiogram 11/14/2014    Mild LVE. EF 40%. Mild, diffuse hypk. Mild LAE.   Mild MR.      Hypercholesteremia 1/08/14    Hypertension 2000    Noncompliance with medications 2/16/2016    Obesity     Orthostatic hypotension 5/17/2016    Osteoarthritis of both knees     Pain management 04/01/2016    Dr. Gerardo Reid Popliteal cyst, bilateral      Vitamin D deficiency 10/16/14        Past Surgical History:   Procedure Laterality Date    HX HEART CATHETERIZATION      HX TUBAL LIGATION          Prior to Admission medications    Medication Sig Start Date End Date Taking? Authorizing Provider   tizanidine HCl (TIZANIDINE PO) Take  by mouth. Yes Morales, MD Tg   furosemide (LASIX) 40 mg tablet Take 1 Tab by mouth two (2) times a day. 10/11/19  Yes Hubert Patterson MD   carvedilol (COREG) 6.25 mg tablet Take 1 Tab by mouth two (2) times daily (with meals). 10/11/19  Yes Hubert Patterson MD   albuterol (PROVENTIL HFA, VENTOLIN HFA, PROAIR HFA) 90 mcg/actuation inhaler Take 2 Puffs by inhalation every four (4) hours as needed for Wheezing. 9/30/19  Yes Mely Chow MD   sacubitril-valsartan (ENTRESTO) 97 mg/103 mg tablet Take 1 Tab by mouth two (2) times a day. 7/19/19  Yes Kendra Rabago NP   aspirin 81 mg chewable tablet Take 2 Tabs by mouth daily. 7/19/19  Yes Kendra Rabago NP   raNITIdine (ZANTAC) 150 mg tablet Take 1 Tab by mouth two (2) times a day. 7/12/18  Yes Christa RED NP   atorvastatin (LIPITOR) 20 mg tablet Take 1 Tab by mouth nightly. Patient taking differently: Take 20 mg by mouth two (2) times a day. 5/23/18  Yes Leora Bar MD   insulin detemir (LEVEMIR) 100 unit/mL injection 15 units daily for diabetes 7/20/17  Yes Iqra Irizarry NP   desloratadine (CLARINEX) 5 mg tablet Take 1 Tab by mouth daily as needed for Allergies. 4/26/17  Yes Iqra Irizarry NP   mometasone (NASONEX) 50 mcg/actuation nasal spray 2 Sprays by Both Nostrils route daily as needed. For allergies 4/26/17  Yes Gifty Irizarry NP   umeclidinium-vilanterol (ANORO ELLIPTA) 62.5-25 mcg/actuation inhaler Take 1 Puff by inhalation daily. For COPD 4/26/17  Yes Gifty Irizarry NP   ferrous sulfate 325 mg (65 mg iron) tablet Take 1 Tab by mouth two (2) times a day.  Indications: IRON DEFICIENCY ANEMIA 10/22/15  Yes Nahum Noble NP       Current Facility-Administered Medications   Medication Dose Route Frequency    influenza vaccine 2019-20 (6 mos+)(PF) (FLUARIX/FLULAVAL/FLUZONE QUAD) injection 0.5 mL  0.5 mL IntraMUSCular PRIOR TO DISCHARGE    aspirin chewable tablet 81 mg  81 mg Oral DAILY    alum-mag hydroxide-simeth (MYLANTA) oral suspension 30 mL  30 mL Oral ONCE    lidocaine (XYLOCAINE) 2 % viscous solution 15 mL  15 mL Mouth/Throat ONCE    pantoprazole (PROTONIX) tablet 40 mg  40 mg Oral ONCE    heparin (porcine) injection 5,000 Units  5,000 Units SubCUTAneous Q8H    sodium chloride (NS) flush 5-40 mL  5-40 mL IntraVENous Q8H    insulin lispro (HUMALOG) injection   SubCUTAneous Q6H    fentaNYL (PF) 900 mcg/30 ml infusion soln  0-200 mcg/hr IntraVENous TITRATE    dexmedeTOMidine (PRECEDEX) 400 mcg in 0.9% sodium chloride 100 mL infusion  0.2-1.5 mcg/kg/hr IntraVENous TITRATE    budesonide (PULMICORT) 500 mcg/2 ml nebulizer suspension  500 mcg Nebulization BID RT    albuterol-ipratropium (DUO-NEB) 2.5 MG-0.5 MG/3 ML  3 mL Nebulization Q4H RT    methylPREDNISolone (PF) (SOLU-MEDROL) injection 40 mg  40 mg IntraVENous Q12H    famotidine (PF) (PEPCID) 20 mg in sodium chloride 0.9% 10 mL injection  20 mg IntraVENous Q24H    carvedilol (COREG) tablet 3.125 mg  3.125 mg Oral BID WITH MEALS    multivit-folic acid-herbal 751 (WELLESSE PLUS) oral liquid 30 mL  30 mL Per NG tube DAILY    cefTRIAXone (ROCEPHIN) 2 g in sterile water (preservative free) 20 mL IV syringe  2 g IntraVENous Q24H    azithromycin (ZITHROMAX) 500 mg in  mL  500 mg IntraVENous Q24H       No Known Allergies     Social History     Tobacco Use    Smoking status: Former Smoker     Packs/day: 0.25     Years: 48.00     Pack years: 12.00     Types: Cigarettes     Last attempt to quit: 2018     Years since quittin.4    Smokeless tobacco: Never Used   Substance Use Topics    Alcohol use: No     Alcohol/week: 0.0 standard drinks        Family History   Problem Relation Age of Onset    Diabetes Mother     Hypertension Mother     Hypertension Father     Kidney Disease Maternal Aunt 50        Dialysis          Review of Systems:  A comprehensive review of systems was negative. Objective:   Vital Signs:    Visit Vitals  /79   Pulse (!) 109   Temp 99.3 °F (37.4 °C)   Resp 24   Ht 5' 4\" (1.626 m)   Wt 103.4 kg (228 lb)   SpO2 94%   Breastfeeding? No   BMI 39.14 kg/m²       O2 Device: Nasal cannula, Humidifier   O2 Flow Rate (L/min): 5 l/min   Temp (24hrs), Av °F (37.2 °C), Min:98.3 °F (36.8 °C), Max:99.7 °F (37.6 °C)       Intake/Output:   Last shift:      10/20 0701 - 10/20 1900  In: 0   Out: 510 [Urine:510]  Last 3 shifts: 10/18 1901 - 10/20 07  In: 5063.7 [I.V.:4463.7]  Out: 6535 [Urine:3095]    Intake/Output Summary (Last 24 hours) at 10/20/2019 1410  Last data filed at 10/20/2019 1340  Gross per 24 hour   Intake 2559.95 ml   Output 880 ml   Net 1679.95 ml       Ventilator Settings:  Mode Rate Tidal Volume Pressure FiO2 PEEP   Spontaneous, Pressure support   500 ml  7 cm H2O 45 % 5 cm H20     Peak airway pressure: 13 cm H2O    Minute ventilation: 10.3 l/min      ARDS network Guidelines:   Lung protective strategy and Plateau  Pressure goal < 30 cm H2O goals  Oxygenation Goals PaO2 55-80 mm Hg or SaO2 88-95%  PH goal 7.30-7.45    VAP bundle:  Reviewed. Kassandra tube to suction at 20-30 cm Hg. Maintain Kassandra tube with 5-10ml air every 4 hours  Routine oral care every 4 hours  Elevation of head > 45 degree  Daily sedation holiday and SBT evaluation starting at 6.00am.    Physical Exam:     General:  Intubated, on mechanical ventilation, able to follow commands VSS. NAD   Head:  Normocephalic, without obvious abnormality, atraumatic. Eyes:  Conjunctivae/corneas clear. PERRL, EOMI, sclera anicteric   Nose: Nares normal. Septum midline. Mucosa normal. No drainage or sinus tenderness. Throat: Lips intact, ET tube in place, no oral lesions visible, otherwise unable to assess   Neck: Supple, symmetrical, trachea midline, no adenopathy, no carotid bruit and no JVD. Lungs:   Symmetrical chest rise.   CTABL,, decreased bases without wheezes/rales/rhonchi   Heart:  RRR, S1, S2 normal, no m/r/g   Abdomen:   Soft, non-tender. Bowel sounds hypoactive. No masses,  No organomegaly. Extremities: Extremities normal, atraumatic, no cyanosis. Trace B/L LE Edema   Pulses: 2+ and symmetric all extremities.    Skin: Skin color, texture, turgor normal. No rashes or lesions   Neurologic: Sedated   Devices: + P. IV, ETTube, Frye       Data:     Recent Results (from the past 24 hour(s))   CARDIAC PANEL,(CK, CKMB & TROPONIN)    Collection Time: 10/19/19  2:40 PM   Result Value Ref Range    CK 82 26 - 192 U/L    CK - MB 1.4 <3.6 ng/ml    CK-MB Index 1.7 0.0 - 4.0 %    Troponin-I, QT <0.02 0.0 - 0.045 NG/ML   GLUCOSE, POC    Collection Time: 10/19/19  5:44 PM   Result Value Ref Range    Glucose (POC) 173 (H) 70 - 110 mg/dL   TSH 3RD GENERATION    Collection Time: 10/19/19  8:40 PM   Result Value Ref Range    TSH 0.47 0.36 - 3.74 uIU/mL   PTH INTACT    Collection Time: 10/19/19  8:40 PM   Result Value Ref Range    Calcium 8.7 8.5 - 10.1 MG/DL    PTH, Intact 386.0 (H) 18.4 - 88.0 pg/mL   IRON PROFILE    Collection Time: 10/19/19  8:40 PM   Result Value Ref Range    Iron 19 (L) 50 - 175 ug/dL    TIBC 268 250 - 450 ug/dL    Iron % saturation 7 %   FERRITIN    Collection Time: 10/19/19  8:40 PM   Result Value Ref Range    Ferritin 30 8 - 388 NG/ML   CARDIAC PANEL,(CK, CKMB & TROPONIN)    Collection Time: 10/19/19  8:40 PM   Result Value Ref Range    CK 76 26 - 192 U/L    CK - MB 1.1 <3.6 ng/ml    CK-MB Index 1.4 0.0 - 4.0 %    Troponin-I, QT <0.02 0.0 - 0.045 NG/ML   GLUCOSE, POC    Collection Time: 10/19/19 11:24 PM   Result Value Ref Range    Glucose (POC) 193 (H) 70 - 138 mg/dL   METABOLIC PANEL, BASIC    Collection Time: 10/20/19  4:23 AM   Result Value Ref Range    Sodium 142 136 - 145 mmol/L    Potassium 4.5 3.5 - 5.5 mmol/L    Chloride 111 100 - 111 mmol/L    CO2 21 21 - 32 mmol/L    Anion gap 10 3.0 - 18 mmol/L    Glucose 179 (H) 74 - 99 mg/dL    BUN 30 (H) 7.0 - 18 MG/DL    Creatinine 1.61 (H) 0.6 - 1.3 MG/DL    BUN/Creatinine ratio 19 12 - 20      GFR est AA 39 (L) >60 ml/min/1.73m2    GFR est non-AA 32 (L) >60 ml/min/1.73m2    Calcium 8.5 8.5 - 10.1 MG/DL   NT-PRO BNP    Collection Time: 10/20/19  4:23 AM   Result Value Ref Range    NT pro-BNP 1,462 (H) 0 - 900 PG/ML   CBC WITH AUTOMATED DIFF    Collection Time: 10/20/19  4:23 AM   Result Value Ref Range    WBC 9.2 4.6 - 13.2 K/uL    RBC 3.54 (L) 4.20 - 5.30 M/uL    HGB 8.8 (L) 12.0 - 16.0 g/dL    HCT 29.1 (L) 35.0 - 45.0 %    MCV 82.2 74.0 - 97.0 FL    MCH 24.9 24.0 - 34.0 PG    MCHC 30.2 (L) 31.0 - 37.0 g/dL    RDW 17.7 (H) 11.6 - 14.5 %    PLATELET 975 439 - 471 K/uL    MPV 9.9 9.2 - 11.8 FL    NEUTROPHILS 89 (H) 40 - 73 %    LYMPHOCYTES 7 (L) 21 - 52 %    MONOCYTES 4 3 - 10 %    EOSINOPHILS 0 0 - 5 %    BASOPHILS 0 0 - 2 %    ABS. NEUTROPHILS 8.2 (H) 1.8 - 8.0 K/UL    ABS. LYMPHOCYTES 0.6 (L) 0.9 - 3.6 K/UL    ABS. MONOCYTES 0.4 0.05 - 1.2 K/UL    ABS. EOSINOPHILS 0.0 0.0 - 0.4 K/UL    ABS.  BASOPHILS 0.0 0.0 - 0.1 K/UL    DF AUTOMATED     MAGNESIUM    Collection Time: 10/20/19  4:23 AM   Result Value Ref Range    Magnesium 2.0 1.6 - 2.6 mg/dL   PHOSPHORUS    Collection Time: 10/20/19  4:23 AM   Result Value Ref Range    Phosphorus 4.3 2.5 - 4.9 MG/DL   CALCIUM, IONIZED    Collection Time: 10/20/19  4:23 AM   Result Value Ref Range    Ionized Calcium 1.17 1.12 - 1.32 MMOL/L   T4, FREE    Collection Time: 10/20/19  4:23 AM   Result Value Ref Range    T4, Free 1.2 0.7 - 1.5 NG/DL   GLUCOSE, POC    Collection Time: 10/20/19  6:24 AM   Result Value Ref Range    Glucose (POC) 205 (H) 70 - 110 mg/dL   POC G3    Collection Time: 10/20/19  6:55 AM   Result Value Ref Range    Device: VENT      FIO2 (POC) 50 %    pH (POC) 7.394 7.35 - 7.45      pCO2 (POC) 34.4 (L) 35.0 - 45.0 MMHG    pO2 (POC) 94 80 - 100 MMHG    HCO3 (POC) 20.9 (L) 22 - 26 MMOL/L    sO2 (POC) 97 92 - 97 %    Base deficit (POC) 4 mmol/L    Mode ASSIST CONTROL      Tidal volume 500 ml    Set Rate 16 bpm    PEEP/CPAP (POC) 5 cmH2O    Allens test (POC) YES      Total resp. rate 16      Site LEFT RADIAL      Patient temp. 99.7      Specimen type (POC) ARTERIAL      Performed by Reji Alvarado     Volume control plus YES     GLUCOSE, POC    Collection Time: 10/20/19 11:14 AM   Result Value Ref Range    Glucose (POC) 182 (H) 70 - 110 mg/dL           Recent Labs     10/20/19  0655 10/19/19  0426 10/19/19  0125   FIO2I 50 50 100   HCO3I 20.9* 22.0 23.4   PCO2I 34.4* 34.6* 49.3*   PHI 7.394 7.412 7.285*   PO2I 94 66* 204*       Telemetry:normal sinus rhythm    Imaging:  I have personally reviewed the patients radiographs and have reviewed the reports:    CXR 10/18/2019: (my read): No PTX. ETTube high.  + Volume overloaded     PCXR 10/19/2019 : Pending    CTA Chest 10/18/2019:  CT Results (most recent):  Results from [place] encounter on 10/18/19   CTA CHEST W OR W WO CONT    Narrative EXAM: CTA CHEST W OR W WO CONT    INDICATIONS: SOB on oxygen    TECHNIQUE: Utilizing pulmonary embolus protocol, thin section axial images were  obtained from the thoracic inlet into the upper abdomen after the uneventful  administration of IV contrast. Coronal and sagittal maximum intensity projection  (MIP) post-processed images were generated to better define pulmonary artery  anatomy and enhance sensitivity for detection of pulmonary emboli. Contrast  used: 72 cc [id number]    CT scans at this facility are performed using dose optimization technique as  appropriate with performed exam, to include automated exposure control,  adjustment of mA and/or kV according to patient's size (including appropriate  matching for site-specific examinations), or use of iterative reconstruction  technique. COMPARISON: May 2019    FINDINGS:    Suboptimal quality opacification. Decreased resolution due to body habitus. Contrast timing is of good quality however.     Pulmonary arteries: No large/central PE. No suspicious peripheral focus. The  main pulmonary artery is dilated at 3.6 cm diameter. Mediastinum: No adenopathy. Normal caliber aorta with mild atherosclerosis. Normal heart size without pericardial effusion. Enteric tube courses into the  stomach. ET tube unremarkable. Lungs/pleura: Respiratory motion artifact limits evaluation. Small to moderate  amount of ill-defined consolidation within the lower lungs. Groundglass  inflammation within the remainder of the lungs with some septal thickening. Small bilateral pleural effusions. Chest soft tissues: Unremarkable. Abdomen: Unremarkable. Bones: No acute findings. Unremarkable for age. Impression IMPRESSION[de-identified]    1.  Negative for acute PE.  -Enlarged main pulmonary artery as can be seen with pulmonary arterial  hypertension; appearance as before    2. Ill-defined bilateral lung consolidation, likely pneumonia. There is an  element of fluid overload with lung edema as well as small pleural effusions. Total of 43 min critical care time spent at bedside during the course of care providing evaluation,management and care decisions and ordering appropriate treatment related to critical care problems exclusive of procedures. The reason for providing this level of medical care for this critically ill patient was due a critical illness that impaired one or more vital organ systems such that there was a high probability of imminent or life threatening deterioration in the patients condition. This care involved high complexity decision making to assess, manipulate, and support vital system functions, to treat this degree vital organ system failure and to prevent further life threatening deterioration of the patients condition.       Josette Del Rio MD/MPH     Pulmonary, Critical Care Medicine  [facility]

## 2019-10-20 NOTE — PROGRESS NOTES
2230: Patient order reviewed for tube feeding; new tube feeding hung. 2240: Versed hanging since Saturday; wasted 50 mL with Sue River RN.

## 2019-10-20 NOTE — ROUTINE PROCESS
Bedside and Verbal shift change report given to Cite José Clay, FLORENCIA and Jon Lutz RN (oncoming nurse) by Stacy Foote RN (offgoing nurse). Report included the following information SBAR, Kardex, ED Summary, Procedure Summary, Intake/Output, MAR, Recent Results, Med Rec Status and Cardiac Rhythm Sinus Rhythm.

## 2019-10-20 NOTE — PROGRESS NOTES
Problem: Non-Violent Restraints  Goal: *Removal from restraints as soon as assessed to be safe  Outcome: Progressing Towards Goal  Goal: *No harm/injury to patient while restraints in use  Outcome: Progressing Towards Goal  Goal: *Patient's dignity will be maintained  Outcome: Progressing Towards Goal  Goal: *Patient Specific Goal (EDIT GOAL, INSERT TEXT)  Outcome: Progressing Towards Goal  Goal: Non-violent Restaints:Standard Interventions  Outcome: Progressing Towards Goal  Goal: Non-violent Restraints:Patient Interventions  Outcome: Progressing Towards Goal  Goal: Patient/Family Education  Outcome: Progressing Towards Goal     Problem: Ventilator Management  Goal: *Adequate oxygenation and ventilation  Outcome: Progressing Towards Goal  Goal: *Patient maintains clear airway/free of aspiration  Outcome: Progressing Towards Goal  Goal: *Absence of infection signs and symptoms  Outcome: Progressing Towards Goal  Goal: *Normal spontaneous ventilation  Outcome: Progressing Towards Goal     Problem: Patient Education: Go to Patient Education Activity  Goal: Patient/Family Education  Outcome: Progressing Towards Goal     Problem: Falls - Risk of  Goal: *Absence of Falls  Description  Document Ramona Upton Fall Risk and appropriate interventions in the flowsheet.   Outcome: Progressing Towards Goal  Note:   Fall Risk Interventions:       Mentation Interventions: Adequate sleep, hydration, pain control, Bed/chair exit alarm, Door open when patient unattended, Evaluate medications/consider consulting pharmacy, More frequent rounding, Reorient patient, Room close to nurse's station, Toileting rounds, Update white board    Medication Interventions: Assess postural VS orthostatic hypotension, Evaluate medications/consider consulting pharmacy    Elimination Interventions: Bed/chair exit alarm, Call light in reach, Toilet paper/wipes in reach, Toileting schedule/hourly rounds    History of Falls Interventions: Bed/chair exit alarm, Door open when patient unattended, Evaluate medications/consider consulting pharmacy, Investigate reason for fall, Room close to nurse's station, Vital signs minimum Q4HRs X 24 hrs (comment for end date)         Problem: Patient Education: Go to Patient Education Activity  Goal: Patient/Family Education  Outcome: Progressing Towards Goal     Problem: Pressure Injury - Risk of  Goal: *Prevention of pressure injury  Description  Document Vladislav Scale and appropriate interventions in the flowsheet. Outcome: Progressing Towards Goal  Note:   Pressure Injury Interventions:  Sensory Interventions: Assess changes in LOC, Keep linens dry and wrinkle-free, Monitor skin under medical devices, Turn and reposition approx. every two hours (pillows and wedges if needed), Float heels, Check visual cues for pain         Activity Interventions: Assess need for specialty bed, Pressure redistribution bed/mattress(bed type), PT/OT evaluation    Mobility Interventions: Turn and reposition approx.  every two hours(pillow and wedges), Assess need for specialty bed, Float heels, Pressure redistribution bed/mattress (bed type)    Nutrition Interventions: Document food/fluid/supplement intake, Discuss nutritional consult with provider    Friction and Shear Interventions: Apply protective barrier, creams and emollients, Foam dressings/transparent film/skin sealants, HOB 30 degrees or less, Lift sheet, Lift team/patient mobility team                Problem: Patient Education: Go to Patient Education Activity  Goal: Patient/Family Education  Outcome: Progressing Towards Goal     Problem: Nutrition Deficit  Goal: *Optimize nutritional status  Outcome: Progressing Towards Goal

## 2019-10-20 NOTE — ROUTINE PROCESS
Bedside and Verbal shift change report given to Nurse Sobeida DON (oncoming nurse) by Chapito South RN   (offgoing nurse). Report included the following information SBAR, Kardex, MAR and Recent Results.

## 2019-10-20 NOTE — PROGRESS NOTES
Patient is not available to be assessed at this time.       [address] [facility].   [phone]

## 2019-10-20 NOTE — PROGRESS NOTES
Progress Note    76y F with PMH HTN, CKD, COPD, heart failure, admitted for respiratory failure, seen for renal insufficieny  Subjective      Extubated this morning   Hemodynamic stable,   Low urine output     IMPRESSION:   CARA,   CKD 3, baseline creatinine arorund 1.4  Heavy proteinuria  Respiratory failure, acute, intubated  Heart failure  COPD  Anemia   Secondary hyperparathyroid of CKD   PLAN:   Ordered lasix 40mg ivpb for now,   DC iv fluid. Start on calctirol from tomorrow   Pending urine protein study   Adjust meds per current renal function status.     Discussed with Dr. Franck Engle            Current Facility-Administered Medications   Medication Dose Route Frequency    influenza vaccine 2019-20 (6 mos+)(PF) (FLUARIX/FLULAVAL/FLUZONE QUAD) injection 0.5 mL  0.5 mL IntraMUSCular PRIOR TO DISCHARGE    aspirin chewable tablet 81 mg  81 mg Oral DAILY    heparin (porcine) injection 5,000 Units  5,000 Units SubCUTAneous Q8H    sodium chloride (NS) flush 5-40 mL  5-40 mL IntraVENous Q8H    sodium chloride (NS) flush 5-40 mL  5-40 mL IntraVENous PRN    acetaminophen (TYLENOL) tablet 650 mg  650 mg Oral Q6H PRN    ondansetron (ZOFRAN) injection 4 mg  4 mg IntraVENous Q6H PRN    insulin lispro (HUMALOG) injection   SubCUTAneous Q6H    glucose chewable tablet 16 g  4 Tab Oral PRN    glucagon (GLUCAGEN) injection 1 mg  1 mg IntraMUSCular PRN    dextrose (D50W) injection syrg 12.5-25 g  25-50 mL IntraVENous PRN    fentaNYL (PF) 900 mcg/30 ml infusion soln  0-200 mcg/hr IntraVENous TITRATE    dexmedeTOMidine (PRECEDEX) 400 mcg in 0.9% sodium chloride 100 mL infusion  0.2-1.5 mcg/kg/hr IntraVENous TITRATE    budesonide (PULMICORT) 500 mcg/2 ml nebulizer suspension  500 mcg Nebulization BID RT    albuterol-ipratropium (DUO-NEB) 2.5 MG-0.5 MG/3 ML  3 mL Nebulization Q4H RT    methylPREDNISolone (PF) (SOLU-MEDROL) injection 40 mg  40 mg IntraVENous Q12H    famotidine (PF) (PEPCID) 20 mg in sodium chloride 0.9% 10 mL injection  20 mg IntraVENous Q24H    carvedilol (COREG) tablet 3.125 mg  3.125 mg Oral BID WITH MEALS    multivit-folic acid-herbal 379 (WELLESSE PLUS) oral liquid 30 mL  30 mL Per NG tube DAILY    midazolam (VERSED) injection 0.5-1 mg  0.5-1 mg IntraVENous Q4H PRN    cefTRIAXone (ROCEPHIN) 2 g in sterile water (preservative free) 20 mL IV syringe  2 g IntraVENous Q24H    azithromycin (ZITHROMAX) 500 mg in  mL  500 mg IntraVENous Q24H       Review of Systems:     Not able to provide  Data Review:    Labs: Results:       Chemistry Recent Labs     10/20/19  0423 10/19/19  2040 10/19/19  0125 10/18/19  1715   *  --  151* 141*     --  142 143   K 4.5  --  4.3 4.1     --  111 113*   CO2 21  --  18* 24   BUN 30*  --  20* 21*   CREA 1.61*  --  1.53* 1.49*   CA 8.5 8.7 8.3* 9.1   AGAP 10  --  13 6   BUCR 19  --  13 14   AP  --   --   --  130*   TP  --   --   --  6.9   ALB  --   --   --  3.2*   GLOB  --   --   --  3.7   AGRAT  --   --   --  0.9      CBC w/Diff Recent Labs     10/20/19  0423 10/19/19  0125 10/18/19  1715   WBC 9.2 9.5 6.6   RBC 3.54* 4.16* 3.89*   HGB 8.8* 10.5* 9.8*   HCT 29.1* 35.0 33.6*    308 295   GRANS 89* 91* 58   LYMPH 7* 8* 32   EOS 0 0 2      Coagulation Recent Labs     10/19/19  0125   PTP 13.2   INR 1.0   APTT 28.5       Iron/Ferritin Recent Labs     10/19/19  2040   IRON 19*      BNP No results for input(s): BNPP in the last 72 hours. Cardiac Enzymes Recent Labs     10/19/19  2040 10/19/19  1440   CPK 76 82   CKND1 1.4 1.7      Liver Enzymes Recent Labs     10/18/19  1715   TP 6.9   ALB 3.2*   *   SGOT 8*      Thyroid Studies Lab Results   Component Value Date/Time    TSH 0.47 10/19/2019 08:40 PM         EKG: sinus     Physical Assessment:     Visit Vitals  /77   Pulse 100   Temp 99.3 °F (37.4 °C)   Resp 20   Ht 5' 4\" (1.626 m)   Wt 103.4 kg (228 lb)   SpO2 97%   Breastfeeding?  No   BMI 39.14 kg/m²     Weight change: 2.268 kg (5 lb)    Intake/Output Summary (Last 24 hours) at 10/20/2019 1257  Last data filed at 10/20/2019 1215  Gross per 24 hour   Intake 2559.95 ml   Output 530 ml   Net 2029.95 ml     Physical Exam:   General: no acute respiratory distress   HEENT  supple neck, no thyromegaly  CVS: S1S2 heard,  no rub  RS: + air entry b/l,   Abd: Soft, Non tender,   Neuro: intubated, sedated  Extrm: + edema, no cyanosis, clubbing   Skin: no visible  Rash  Musculoskeletal: No gross joints or bone deformities     Procedures/imaging: see electronic medical records for all procedures, Xrays and details which were not copied into this note but were reviewed prior to creation of Desi Johnson MD  October 20, 2019  [facility]  Office [phone]

## 2019-10-20 NOTE — PROGRESS NOTES
Pt extubated to 5 LPM humidified NC following successful SBT and positive cuff leak test. No ABG per Dr Jeanine Myles. Suctioned airway pre and post procedure. No complications, no signs of stridor or distress. Will monitor.

## 2019-10-20 NOTE — ROUTINE PROCESS
Bedside and Verbal shift change report given to Nurse Claudy Michael RN (oncoming nurse) by Otoniel Morales RN   (offgoing nurse). Report included the following information SBAR, Kardex, Procedure Summary, Intake/Output and MAR.

## 2019-10-20 NOTE — PROGRESS NOTES
SBT initiated, PS 7, PEEP +5, FiO2 45%. Pt awake and alert, follows commands. 10/20/19 0920   Weaning Parameters   Spontaneous Breathing Trial Complete Yes   Resp Rate Observed 18   Ve 9.9      RSBI 36     Tolerating well. Will monitor.

## 2019-10-20 NOTE — PROGRESS NOTES
[facility], [address] [place] [address]., [address] [facility] Lindsay Tirado [address] [facility]  Fax: [phone]      CARDIOLOGY PROGRESS NOTE     Impressions:   Respiratory Failure s/p Mechanical Ventilation Pos 2 Liters. Syncope   · CT Head: No acute findings. Coronary artery disease Neg ACS  · 8/28/19 NST: Left ventricular function post-stress was abnormal. Wall motion was abnormal at stress as described below in the wall scoring diagram. Calculated ejection fraction is 44%. The TID ratio is 0.94. Left ventricular perfusion is abnormal.   Perfusion defect 1: There is a left ventricular perfusion defect that is moderate in size with a severe reduction in uptake affecting the mid-apical anterior and apex location(s) that is non-reversible. There is abnormal wall motion in the defect area. Viability in the area is poor. The defect appears to be infarction. Perfusion defect was visually present without quantitative evidence.   · s/p YADIRA to proximal LAD with RESOLUTE stent 5/20/2018.  Acute stent thrombosis shortly after initial PCI on 5/20/2018 requiring emergency intubation for pulmonary edema and repeat cardiac cath and additional PCI/YADIRA to prox LAD.   Chronic Heart failure with mild systolic dysfunction exacerbation-improved very well clinically.    Hx of Ischemic cardiomyopathy- with EF 35% on echo 5/21/2018  with elevated pulmonary pressures at 44mm Hg- now with mild systolic Dysfunction Recent Echo 8/7/19 showed EF% 45%-50%  Hypertension- stable  on Entresto and bb   DM Type II- medications per medical team   Dyslipidemia- on Lipitor 20 mg. LDL 80 on 8/19   COPD exacerbation- managed by medical team   Hx of Tobacco abuse with   1 ppd X 50 years- patient quit in 2018  Obesity- weight loss advised  TESSA- uses CPAP at night   Urinary tract infection  Plan:   Telemetry  Carvedilol/Heparin SQ  Hemodialysis for volume removal  Repeat Echocardiogram pending  Carotid PVL pending      8/7/19 Echocardiogram:  · Left Ventricle: Normal cavity size and wall thickness. Mild systolic dysfunction. Estimated left ventricular ejection fraction is 46 - 50%. Abnormal left ventricular wall motion. Moderate (grade 2) left ventricular diastolic dysfunction. · Left Atrium: Moderately dilated left atrium. · Aortic Valve: Aortic valve sclerosis. · Mitral Valve: Mitral valve thickening. Moderate to severe mitral valve regurgitation. · Tricuspid Valve: Mild tricuspid valve regurgitation is present. Pulmonary arterial systolic pressure is 83.3 mmHg. Moderate pulmonary hypertension. · Pulmonic Valve: Mild pulmonic valve regurgitation is present. · IVC/Hepatic Veins: Severely elevated central venous pressure (15+ mmHg); IVC diameter is larger than 21 mm and collapses less than 50% with respiration.       ASSESSMENT:  Hospital Problems  Date Reviewed: 10/11/2019          Codes Class Noted POA    Pulmonary edema cardiac cause ([facility] ICD-10-CM: I50.1  ICD-9-CM: 734  10/19/2019 Unknown        COPD with acute exacerbation ([facility] ICD-10-CM: J44.1  ICD-9-CM: 491.21  10/19/2019 Unknown        Respiratory failure requiring intubation ([facility] ICD-10-CM: J96.90  ICD-9-CM: 518.81  10/19/2019 Unknown        Syncope ICD-10-CM: R55  ICD-9-CM: 780.2  10/18/2019 Unknown        Flash pulmonary edema ([facility] ICD-10-CM: J81.0  ICD-9-CM: 518.4  10/18/2019 Unknown        Elevated d-dimer ICD-10-CM: R79.89  ICD-9-CM: 790.92  10/18/2019 Unknown        Acute on chronic respiratory failure with hypoxia (HCC) ICD-10-CM: J96.21  ICD-9-CM: 518.84, 799.02  10/18/2019 Unknown        COPD exacerbation ([facility] ICD-10-CM: J44.1  ICD-9-CM: 491.21  8/25/2019 Unknown                SUBJECTIVE:  Intubated. Alert. No c/o chest pain. No c/o abdominal discomfort. ABG 7.39/34/94/21/100%. CXR:  Bilateral diffuse lung opacities, worsening at the right lung base.  Most likely multifocal pneumonia, less likely edema.       OBJECTIVE:    VS:   Visit Vitals  /57   Pulse 77   Temp 98.7 °F (37.1 °C)   Resp 16   Ht 5' 4\" (1.626 m)   Wt 103.4 kg (228 lb)   SpO2 97%   Breastfeeding? No   BMI 39.14 kg/m²         Intake/Output Summary (Last 24 hours) at 10/20/2019 0859  Last data filed at 10/20/2019 0700  Gross per 24 hour   Intake 2559.95 ml   Output 720 ml   Net 1839.95 ml     TELE: SR    General: No acute distress  HENT: Normocephalic, atraumatic. Neck :  Supple  Cardiac:  Normal S1/S2  Chest/Lungs:Clear anterior  Abdomen: Soft  Extremities:  No edema      Labs: Results:       Chemistry Recent Labs     10/20/19  0423 10/19/19  2040 10/19/19  0125 10/18/19  1715   *  --  151* 141*     --  142 143   K 4.5  --  4.3 4.1     --  111 113*   CO2 21  --  18* 24   BUN 30*  --  20* 21*   CREA 1.61*  --  1.53* 1.49*   CA 8.5 8.7 8.3* 9.1   MG 2.0  --  1.9  --    PHOS 4.3  --  3.7  --    AGAP 10  --  13 6   BUCR 19  --  13 14   AP  --   --   --  130*   TP  --   --   --  6.9   ALB  --   --   --  3.2*   GLOB  --   --   --  3.7   AGRAT  --   --   --  0.9      CBC w/Diff Recent Labs     10/20/19  0423 10/19/19  0125 10/18/19  1715   WBC 9.2 9.5 6.6   RBC 3.54* 4.16* 3.89*   HGB 8.8* 10.5* 9.8*   HCT 29.1* 35.0 33.6*    308 295   GRANS 89* 91* 58   LYMPH 7* 8* 32   EOS 0 0 2      Cardiac Enzymes Recent Labs     10/19/19  2040 10/19/19  1440   CPK 76 82   CKND1 1.4 1.7      Coagulation Recent Labs     10/19/19  0125   PTP 13.2   INR 1.0   APTT 28.5       Lipid Panel Lab Results   Component Value Date/Time    Cholesterol, total 152 10/10/2019 12:00 AM    HDL Cholesterol 46 10/10/2019 12:00 AM    LDL, calculated 86 10/10/2019 12:00 AM    VLDL, calculated 20 10/10/2019 12:00 AM    Triglyceride 101 10/10/2019 12:00 AM    CHOL/HDL Ratio 5.6 (H) 05/21/2018 06:23 AM      BNP No results for input(s): BNPP in the last 72 hours.    Liver Enzymes Recent Labs     10/18/19  1715   TP 6.9   ALB 3.2*   *   SGOT 8*      Digoxin    Thyroid Studies Lab Results Component Value Date/Time    TSH 0.47 10/19/2019 08:40 PM              Du Osuna MD   Pager # [phone]

## 2019-10-21 ENCOUNTER — APPOINTMENT (OUTPATIENT)
Dept: GENERAL RADIOLOGY | Age: 69
DRG: 208 | End: 2019-10-21
Attending: PHYSICIAN ASSISTANT
Payer: MEDICARE

## 2019-10-21 ENCOUNTER — APPOINTMENT (OUTPATIENT)
Dept: GENERAL RADIOLOGY | Age: 69
DRG: 208 | End: 2019-10-21
Attending: STUDENT IN AN ORGANIZED HEALTH CARE EDUCATION/TRAINING PROGRAM
Payer: MEDICARE

## 2019-10-21 ENCOUNTER — APPOINTMENT (OUTPATIENT)
Dept: VASCULAR SURGERY | Age: 69
DRG: 208 | End: 2019-10-21
Attending: INTERNAL MEDICINE
Payer: MEDICARE

## 2019-10-21 LAB
ANION GAP SERPL CALC-SCNC: 6 MMOL/L (ref 3–18)
ATRIAL RATE: 99 BPM
BASOPHILS # BLD: 0 K/UL (ref 0–0.1)
BASOPHILS NFR BLD: 0 % (ref 0–2)
BNP SERPL-MCNC: 3696 PG/ML (ref 0–900)
BUN SERPL-MCNC: 46 MG/DL (ref 7–18)
BUN/CREAT SERPL: 23 (ref 12–20)
CA-I SERPL-SCNC: 1.19 MMOL/L (ref 1.12–1.32)
CALCIUM SERPL-MCNC: 8.6 MG/DL (ref 8.5–10.1)
CALCULATED P AXIS, ECG09: 61 DEGREES
CALCULATED R AXIS, ECG10: 41 DEGREES
CALCULATED T AXIS, ECG11: 93 DEGREES
CHLORIDE SERPL-SCNC: 111 MMOL/L (ref 100–111)
CO2 SERPL-SCNC: 24 MMOL/L (ref 21–32)
CREAT SERPL-MCNC: 2.03 MG/DL (ref 0.6–1.3)
DIAGNOSIS, 93000: NORMAL
DIFFERENTIAL METHOD BLD: ABNORMAL
ECHO LV EDV TEICHHOLZ: 0.98 ML
ECHO LV EJECTION FRACTION BIPLANE: 53.7 % (ref 55–100)
ECHO LV ESV BP: 73.83 ML (ref 19–49)
ECHO LV ESV INDEX BP: 35.7 ML/M2
ECHO LV ESV TEICHHOLZ: 0.45 ML
ECHO LV INTERNAL DIMENSION DIASTOLIC: 26.35 CM (ref 3.9–5.3)
ECHO LV INTERNAL DIMENSION SYSTOLIC: 18.93 CM
ECHO LV IVSD: 1.13 CM (ref 0.6–0.9)
ECHO LV MASS 2D: 255.29 G (ref 67–162)
ECHO LV MASS INDEX 2D: 123.4 G/M2 (ref 43–95)
ECHO LV POSTERIOR WALL DIASTOLIC: 37.54 CM (ref 0.6–0.9)
ECHO LVOT SV: 85.8 ML
EOSINOPHIL # BLD: 0 K/UL (ref 0–0.4)
EOSINOPHIL NFR BLD: 0 % (ref 0–5)
ERYTHROCYTE [DISTWIDTH] IN BLOOD BY AUTOMATED COUNT: 18.2 % (ref 11.6–14.5)
GLUCOSE BLD STRIP.AUTO-MCNC: 134 MG/DL (ref 70–110)
GLUCOSE BLD STRIP.AUTO-MCNC: 181 MG/DL (ref 70–110)
GLUCOSE BLD STRIP.AUTO-MCNC: 196 MG/DL (ref 70–110)
GLUCOSE BLD STRIP.AUTO-MCNC: 205 MG/DL (ref 70–110)
GLUCOSE SERPL-MCNC: 139 MG/DL (ref 74–99)
HCT VFR BLD AUTO: 28.4 % (ref 35–45)
HGB BLD-MCNC: 8.5 G/DL (ref 12–16)
LEFT CCA DIST DIAS: 17.5 CM/S
LEFT CCA DIST SYS: 80.6 CM/S
LEFT CCA MID DIAS: 17.5 CM/S
LEFT CCA MID SYS: 108.2 CM/S
LEFT CCA PROX DIAS: 18.5 CM/S
LEFT CCA PROX SYS: 95.5 CM/S
LEFT ECA DIAS: 15.8 CM/S
LEFT ECA SYS: 171 CM/S
LEFT ICA DIST DIAS: 25.7 CM/S
LEFT ICA DIST SYS: 108.1 CM/S
LEFT ICA MID DIAS: 22.5 CM/S
LEFT ICA MID SYS: 96.8 CM/S
LEFT ICA PROX DIAS: 24.1 CM/S
LEFT ICA PROX SYS: 114.6 CM/S
LEFT SUBCLAVIAN SYS: 130 CM/S
LEFT VERTEBRAL DIAS: 7.9 CM/S
LEFT VERTEBRAL SYS: 46.7 CM/S
LVFS 2D: 28.15 %
LVSV (TEICH): 39.71 ML
LYMPHOCYTES # BLD: 1.4 K/UL (ref 0.9–3.6)
LYMPHOCYTES NFR BLD: 14 % (ref 21–52)
MAGNESIUM SERPL-MCNC: 2.1 MG/DL (ref 1.6–2.6)
MCH RBC QN AUTO: 24.6 PG (ref 24–34)
MCHC RBC AUTO-ENTMCNC: 29.9 G/DL (ref 31–37)
MCV RBC AUTO: 82.3 FL (ref 74–97)
MONOCYTES # BLD: 0.8 K/UL (ref 0.05–1.2)
MONOCYTES NFR BLD: 8 % (ref 3–10)
NEUTS SEG # BLD: 8 K/UL (ref 1.8–8)
NEUTS SEG NFR BLD: 78 % (ref 40–73)
P-R INTERVAL, ECG05: 160 MS
PHOSPHATE SERPL-MCNC: 3.9 MG/DL (ref 2.5–4.9)
PLATELET # BLD AUTO: 333 K/UL (ref 135–420)
PMV BLD AUTO: 9.6 FL (ref 9.2–11.8)
POTASSIUM SERPL-SCNC: 4.4 MMOL/L (ref 3.5–5.5)
PROCALCITONIN SERPL-MCNC: <0.1 NG/ML
Q-T INTERVAL, ECG07: 346 MS
QRS DURATION, ECG06: 84 MS
QTC CALCULATION (BEZET), ECG08: 444 MS
RBC # BLD AUTO: 3.45 M/UL (ref 4.2–5.3)
RIGHT CCA DIST DIAS: 18.3 CM/S
RIGHT CCA DIST SYS: 106.6 CM/S
RIGHT CCA MID DIAS: 23 CM/S
RIGHT CCA MID SYS: 118.2 CM/S
RIGHT CCA PROX DIAS: 18.4 CM/S
RIGHT CCA PROX SYS: 81.1 CM/S
RIGHT ECA DIAS: 34.7 CM/S
RIGHT ECA SYS: 306 CM/S
RIGHT ICA DIST DIAS: 26.3 CM/S
RIGHT ICA DIST SYS: 93.7 CM/S
RIGHT ICA MID DIAS: 18.2 CM/S
RIGHT ICA MID SYS: 104 CM/S
RIGHT ICA PROX DIAS: 24.1 CM/S
RIGHT ICA PROX SYS: 132.6 CM/S
RIGHT SUBCLAVIAN DIAS: 8.9 CM/S
RIGHT SUBCLAVIAN SYS: 124.5 CM/S
RIGHT VERTEBRAL DIAS: 16.4 CM/S
RIGHT VERTEBRAL SYS: 97.2 CM/S
SODIUM SERPL-SCNC: 141 MMOL/L (ref 136–145)
VENTRICULAR RATE, ECG03: 99 BPM
WBC # BLD AUTO: 10.2 K/UL (ref 4.6–13.2)

## 2019-10-21 PROCEDURE — 74011250637 HC RX REV CODE- 250/637: Performed by: PHYSICIAN ASSISTANT

## 2019-10-21 PROCEDURE — 71045 X-RAY EXAM CHEST 1 VIEW: CPT

## 2019-10-21 PROCEDURE — 85025 COMPLETE CBC W/AUTO DIFF WBC: CPT

## 2019-10-21 PROCEDURE — 92610 EVALUATE SWALLOWING FUNCTION: CPT

## 2019-10-21 PROCEDURE — 83735 ASSAY OF MAGNESIUM: CPT

## 2019-10-21 PROCEDURE — 74011250636 HC RX REV CODE- 250/636: Performed by: PHYSICIAN ASSISTANT

## 2019-10-21 PROCEDURE — 94762 N-INVAS EAR/PLS OXIMTRY CONT: CPT

## 2019-10-21 PROCEDURE — 74011250637 HC RX REV CODE- 250/637: Performed by: INTERNAL MEDICINE

## 2019-10-21 PROCEDURE — 80048 BASIC METABOLIC PNL TOTAL CA: CPT

## 2019-10-21 PROCEDURE — 82962 GLUCOSE BLOOD TEST: CPT

## 2019-10-21 PROCEDURE — 83880 ASSAY OF NATRIURETIC PEPTIDE: CPT

## 2019-10-21 PROCEDURE — 36415 COLL VENOUS BLD VENIPUNCTURE: CPT

## 2019-10-21 PROCEDURE — 93880 EXTRACRANIAL BILAT STUDY: CPT

## 2019-10-21 PROCEDURE — 92526 ORAL FUNCTION THERAPY: CPT

## 2019-10-21 PROCEDURE — 74011000250 HC RX REV CODE- 250: Performed by: PHYSICIAN ASSISTANT

## 2019-10-21 PROCEDURE — 94640 AIRWAY INHALATION TREATMENT: CPT

## 2019-10-21 PROCEDURE — 77010033678 HC OXYGEN DAILY

## 2019-10-21 PROCEDURE — 82330 ASSAY OF CALCIUM: CPT

## 2019-10-21 PROCEDURE — 94660 CPAP INITIATION&MGMT: CPT

## 2019-10-21 PROCEDURE — 74011636637 HC RX REV CODE- 636/637: Performed by: INTERNAL MEDICINE

## 2019-10-21 PROCEDURE — 65660000000 HC RM CCU STEPDOWN

## 2019-10-21 PROCEDURE — 97165 OT EVAL LOW COMPLEX 30 MIN: CPT

## 2019-10-21 PROCEDURE — 74011250636 HC RX REV CODE- 250/636

## 2019-10-21 PROCEDURE — 77030038269 HC DRN EXT URIN PURWCK BARD -A

## 2019-10-21 PROCEDURE — 97162 PT EVAL MOD COMPLEX 30 MIN: CPT

## 2019-10-21 PROCEDURE — 84100 ASSAY OF PHOSPHORUS: CPT

## 2019-10-21 RX ORDER — CHOLECALCIFEROL (VITAMIN D3) 125 MCG
10 CAPSULE ORAL
Status: DISCONTINUED | OUTPATIENT
Start: 2019-10-21 | End: 2019-10-23

## 2019-10-21 RX ORDER — FAMOTIDINE 20 MG/1
20 TABLET, FILM COATED ORAL DAILY
Status: DISCONTINUED | OUTPATIENT
Start: 2019-10-22 | End: 2019-10-21

## 2019-10-21 RX ORDER — FENTANYL CITRATE 50 UG/ML
25 INJECTION, SOLUTION INTRAMUSCULAR; INTRAVENOUS ONCE
Status: COMPLETED | OUTPATIENT
Start: 2019-10-21 | End: 2019-10-21

## 2019-10-21 RX ORDER — AZITHROMYCIN 250 MG/1
500 TABLET, FILM COATED ORAL EVERY 24 HOURS
Status: COMPLETED | OUTPATIENT
Start: 2019-10-21 | End: 2019-10-24

## 2019-10-21 RX ORDER — LIDOCAINE 4 G/100G
1 PATCH TOPICAL EVERY 24 HOURS
Status: DISCONTINUED | OUTPATIENT
Start: 2019-10-21 | End: 2019-10-26

## 2019-10-21 RX ORDER — FUROSEMIDE 10 MG/ML
40 INJECTION INTRAMUSCULAR; INTRAVENOUS ONCE
Status: COMPLETED | OUTPATIENT
Start: 2019-10-21 | End: 2019-10-21

## 2019-10-21 RX ORDER — DICLOFENAC SODIUM 10 MG/G
2 GEL TOPICAL 4 TIMES DAILY
Status: DISCONTINUED | OUTPATIENT
Start: 2019-10-21 | End: 2019-10-26

## 2019-10-21 RX ORDER — FUROSEMIDE 10 MG/ML
INJECTION INTRAMUSCULAR; INTRAVENOUS
Status: COMPLETED
Start: 2019-10-21 | End: 2019-10-21

## 2019-10-21 RX ORDER — TIZANIDINE 2 MG/1
2 TABLET ORAL
Status: DISCONTINUED | OUTPATIENT
Start: 2019-10-21 | End: 2019-10-27 | Stop reason: HOSPADM

## 2019-10-21 RX ORDER — OXYCODONE AND ACETAMINOPHEN 5; 325 MG/1; MG/1
1 TABLET ORAL
Status: COMPLETED | OUTPATIENT
Start: 2019-10-21 | End: 2019-10-21

## 2019-10-21 RX ADMIN — IPRATROPIUM BROMIDE AND ALBUTEROL SULFATE 3 ML: .5; 3 SOLUTION RESPIRATORY (INHALATION) at 19:33

## 2019-10-21 RX ADMIN — CARVEDILOL 6.25 MG: 12.5 TABLET, FILM COATED ORAL at 09:30

## 2019-10-21 RX ADMIN — Medication 10 ML: at 14:12

## 2019-10-21 RX ADMIN — HEPARIN SODIUM 5000 UNITS: 5000 INJECTION INTRAVENOUS; SUBCUTANEOUS at 16:03

## 2019-10-21 RX ADMIN — INSULIN LISPRO 4 UNITS: 100 INJECTION, SOLUTION INTRAVENOUS; SUBCUTANEOUS at 11:24

## 2019-10-21 RX ADMIN — Medication 81 MG: at 09:30

## 2019-10-21 RX ADMIN — HEPARIN SODIUM 5000 UNITS: 5000 INJECTION INTRAVENOUS; SUBCUTANEOUS at 01:57

## 2019-10-21 RX ADMIN — OXYCODONE HYDROCHLORIDE AND ACETAMINOPHEN 1 TABLET: 5; 325 TABLET ORAL at 12:56

## 2019-10-21 RX ADMIN — PREDNISONE 40 MG: 20 TABLET ORAL at 09:30

## 2019-10-21 RX ADMIN — Medication 10 ML: at 06:00

## 2019-10-21 RX ADMIN — FAMOTIDINE 20 MG: 10 INJECTION INTRAVENOUS at 01:57

## 2019-10-21 RX ADMIN — CARVEDILOL 6.25 MG: 12.5 TABLET, FILM COATED ORAL at 16:04

## 2019-10-21 RX ADMIN — FUROSEMIDE 80 MG: 10 INJECTION, SOLUTION INTRAVENOUS at 21:53

## 2019-10-21 RX ADMIN — IPRATROPIUM BROMIDE AND ALBUTEROL SULFATE 3 ML: .5; 3 SOLUTION RESPIRATORY (INHALATION) at 09:24

## 2019-10-21 RX ADMIN — INSULIN LISPRO 2 UNITS: 100 INJECTION, SOLUTION INTRAVENOUS; SUBCUTANEOUS at 16:03

## 2019-10-21 RX ADMIN — BUDESONIDE 500 MCG: 0.5 INHALANT RESPIRATORY (INHALATION) at 19:33

## 2019-10-21 RX ADMIN — DICLOFENAC 2 G: 10 GEL TOPICAL at 14:10

## 2019-10-21 RX ADMIN — IPRATROPIUM BROMIDE AND ALBUTEROL SULFATE 3 ML: .5; 3 SOLUTION RESPIRATORY (INHALATION) at 03:04

## 2019-10-21 RX ADMIN — Medication 30 ML: at 11:26

## 2019-10-21 RX ADMIN — HEPARIN SODIUM 5000 UNITS: 5000 INJECTION INTRAVENOUS; SUBCUTANEOUS at 09:30

## 2019-10-21 RX ADMIN — MELATONIN TAB 5 MG 10 MG: 5 TAB at 02:40

## 2019-10-21 RX ADMIN — BUDESONIDE 500 MCG: 0.5 INHALANT RESPIRATORY (INHALATION) at 09:24

## 2019-10-21 RX ADMIN — FENTANYL CITRATE 25 MCG: 50 INJECTION, SOLUTION INTRAMUSCULAR; INTRAVENOUS at 02:49

## 2019-10-21 RX ADMIN — FUROSEMIDE 80 MG: 10 INJECTION INTRAMUSCULAR; INTRAVENOUS at 21:53

## 2019-10-21 NOTE — PROGRESS NOTES
Reason for Admission:   Syncope [R55]  COPD exacerbation ([facility] [J44.1]  Acute on chronic respiratory failure with hypoxia (HCC) [J96.21]  Flash pulmonary edema (HCC) [J81.0]  Elevated d-dimer [R79.89]  COPD with acute exacerbation (HCC) [J44.1]  Respiratory failure requiring intubation ([facility] [J96.90]  Pulmonary edema cardiac cause ([facility] [I50.1]               RRAT Score:    35             Resources/supports as identified by patient/family:   Patient has family supports. Top Challenges facing patient (as identified by patient/family and CM): Finances/Medication cost?   [employer]   will transport. Support system or lack thereof? Family    Living arrangements? Lives with her . Self-care/ADLs/Cognition? Alert and oriented. Current Advanced Directive/Advance Care Plan:   no                          Plan for utilizing home health:   Patient has no home health orders in place at this time. This writer will continue to closely monitor for potential home health needs and orders. Likelihood of readmission:   HIGH    Transition of Care Plan:  Patient will return home with help from her family. Patient's  will transport home at the time of discharge. Initial assessment completed with patient and her family. Cognitive status of patient: Alert and oriented. Face sheet information confirmed:  yes. The patient designates her  Jayashree Menendez to participate in her discharge plan and to receive any needed information. This patient lives in a house with her  Jayashree Menendez. Patient is able to navigate steps as needed; however, she gets out of breath. Prior to hospitalization, patient was considered to be independent with ADLs/IADLS : yes . Patient has a current ACP document on file: no     Patient's  Jayashree Dominguez) will be available to transport patient home upon discharge.    The patient already has a cane and a walker available in the home. Patient is not currently active with home health. Patient has not stayed in a skilled nursing facility or rehab. This patient is on dialysis :no    Currently, the discharge plan is for patient to return home with help from her family. Patient's  will transport home at the time of discharge. Patient's  is Broderick Wakefield, BE#[phone]). Patient's PCP is Dr. Abdiaziz Townsend. Patient is insured through [employer] and she also has Mariana Craig. The patient states that she can obtain her medications from the pharmacy, and take her medications as directed. This writer will continue to closely monitor for discharge planning to ensure a safe discharge home from [facility]. Care Management Interventions  PCP Verified by CM: Yes(Dr. Abdiaziz Townsend)  Palliative Care Criteria Met (RRAT>21 & CHF Dx)?: No  Mode of Transport at Discharge: Other (see comment)( will transport.)  Transition of Care Consult (CM Consult): Discharge Planning  Current Support Network: Lives with Spouse, Family Lives Nearby  Confirm Follow Up Transport: Family  Plan discussed with Pt/Family/Caregiver: Yes  Discharge Location  Discharge Placement: Home with family assistance        King Breaux MSW  Care Manager  Pager#: [phone]

## 2019-10-21 NOTE — PROGRESS NOTES
Problem: Self Care Deficits Care Plan (Adult)  Goal: *Acute Goals and Plan of Care (Insert Text)  Description  Occupational Therapy Goals  Initiated 10/21/2019 within 7 day(s). 1.  Patient will perform functional activity seated EOB with modified independence for 3-5 minutes and G balance. 2.  Patient will perform upper body dressing with modified independence. 3.  Patient will perform lower body dressing with supervision/set-up using AE. 4.  Patient will perform toilet transfers with supervision/set-up. 5.  Patient will perform all aspects of toileting with supervision/set-up. 6.  Patient will participate in upper extremity therapeutic exercise/activities with modified independence for 5-7 minutes. 7.  Patient will utilize energy conservation techniques during functional activities with verbal cues. Prior Level of Function: Patient was independent with self-care and used a cane for functional mobility PTA. Outcome: Progressing Towards Goal       OCCUPATIONAL THERAPY EVALUATION    Patient: Wendie Farr (89 y.o. female)  Date: 10/21/2019  Primary Diagnosis: Syncope [R55]  COPD exacerbation (HCC) [J44.1]  Acute on chronic respiratory failure with hypoxia (HCC) [J96.21]  Flash pulmonary edema (HCC) [J81.0]  Elevated d-dimer [R79.89]  COPD with acute exacerbation (HCC) [J44.1]  Respiratory failure requiring intubation (HCC) [J96.90]  Pulmonary edema cardiac cause ([facility] [I50.1]  Precautions: Skin    ASSESSMENT :  RN reports pt has been in increased chest pain since earlier therapy session, however patient is medically stable and cleared to participate in OT evaluation. Upon entering the room, pt was supine in bed, alert, and agreeable to participate in OT evaluation with  present. Patient educated on the role of OT and evaluation process.  Pt with report of SOB during self-care/daily activities since May and educated on energy conservation techniques, pursed lip breathing, and self pacing during daily activities. Patient would benefit from energy conservation handout next session. Patient performed rolling in bed with contact guard assist and declined all other functional transfers at this time 2/2 increased 10/10 chest pain, RN aware. Pt able to doff socks supine using her feet only, but total assist for donning. Pt educated on AE for LBD to assist with chest pain and may benefit from AE training. Based on the objective data described below, the patient presented with decreased endurance, decreased strength, decreased independence, increased pain, and decreased functional mobility, which impede pt's function with basic self-care/ADL tasks. Patient will benefit from skilled intervention to address the above impairments. Patient's rehabilitation potential is considered to be Good  Factors which may influence rehabilitation potential include:   ? None noted  ? Mental ability/status  ? Medical condition  ? Home/family situation and support systems  ? Safety awareness  ? Pain tolerance/management  ? Other:      PLAN :  Recommendations and Planned Interventions:   ?               Self Care Training                  ? Therapeutic Activities  ? Functional Mobility Training   ? Cognitive Retraining  ? Therapeutic Exercises           ? Endurance Activities  ? Balance Training                    ? Neuromuscular Re-Education  ? Visual/Perceptual Training     ? Home Safety Training  ? Patient Education                   ? Family Training/Education  ? Other (comment):    Frequency/Duration: Patient will be followed by occupational therapy 1-2 times per day/4-7 days per week to address goals.   Discharge Recommendations: Skilled Nursing Facility  Further Equipment Recommendations for Discharge: N/A     SUBJECTIVE: Patient stated Izabel Noyola will do it tomorrow if I feel better    OBJECTIVE DATA SUMMARY:     Past Medical History:   Diagnosis Date    Abnormal WBC count 5/17/2016    Bilateral shoulder pain 9/27/2016    Chondromalacia of both patellae     COPD (chronic obstructive pulmonary disease) ([facility] 9/2015    mild-mod dz; Dr Mita Whatley    Diabetes [facility]) 2013    Diabetic eye exam ([facility] [address] 2016    Dilated cardiomyopathy ([facility]     Dyslipidemia     Gout     Heart attack ([facility] [address]     Heart attack ([facility] [address]     History of echocardiogram 11/14/2014    Mild LVE. EF 40%. Mild, diffuse hypk. Mild LAE. Mild MR. Hypercholesteremia 1/08/14    Hypertension 2000    Noncompliance with medications 2/16/2016    Obesity     Orthostatic hypotension 5/17/2016    Osteoarthritis of both knees     Pain management 04/01/2016    Dr. Yogi Ling     Popliteal cyst, bilateral      Vitamin D deficiency 10/16/14     Past Surgical History:   Procedure Laterality Date    HX HEART CATHETERIZATION      HX TUBAL LIGATION       Barriers to Learning/Limitations: None  Compensate with: visual, verbal, tactile, kinesthetic cues/model    Home Situation:   Home Situation  Home Environment: Private residence  One/Two Story Residence: Other (Comment)  Living Alone: No  Support Systems: Spouse/Significant Other/Partner  Patient Expects to be Discharged to[de-identified] Private residence  Current DME Used/Available at Home: Other (comment)(unknown)  Tub or Shower Type: Tub/Shower combination  ? Right hand dominant   ? Left hand dominant    Cognitive/Behavioral Status:  Neurologic State: Alert  Orientation Level: Oriented X4  Cognition: Follows commands  Safety/Judgement: Fall prevention    Skin: Intact  Edema: None noted    Vision/Perceptual:    Acuity: Within Defined Limits      Coordination: BUE  Coordination: Generally decreased, functional(B LEs)  Fine Motor Skills-Upper: Left Intact; Right Intact    Gross Motor Skills-Upper: Left Intact; Right Intact    Balance:  Sitting: Intact  Standing: With support  Standing - Static: (fair-)    Strength: BUE  Strength: Generally decreased, functional    Tone & Sensation: BUE  Tone: Normal    Range of Motion: BUE  AROM: Generally decreased, functional(LUE AROM < RUE)    Functional Mobility and Transfers for ADLs:  Bed Mobility:  Rolling: Contact guard assistance      ADL Assessment:   Feeding: Independent    Oral Facial Hygiene/Grooming: Independent    Bathing: Maximum assistance    Upper Body Dressing: Maximum assistance    Lower Body Dressing: Maximum assistance    Toileting: Total assistance      ADL Intervention:  Lower Body Dressing Assistance  Dressing Assistance: (SBA doffing supine, total donning)  Socks: Total assistance (dependent)  Position Performed: Supine  Cues: Don    Cognitive Retraining  Safety/Judgement: Fall prevention    Pain:  Pain level pre-treatment: 10/10, chest  Pain level post-treatment: 10/10   Pain Intervention(s): Medication (see MAR); Response to intervention: See doc flow    Activity Tolerance:   Decreased tolerance    Please refer to the flowsheet for vital signs taken during this treatment. After treatment:   ? Patient left in no apparent distress sitting up in chair  ? Patient left in no apparent distress in bed  ? Call bell left within reach  ? Nursing notified  ? Caregiver present  ? Bed alarm activated    COMMUNICATION/EDUCATION:   ? Role of Occupational Therapy in the acute care setting  ? Home safety education was provided and the patient/caregiver indicated understanding. ? Patient/family have participated as able in goal setting and plan of care. ? Patient/family agree to work toward stated goals and plan of care. ? Patient understands intent and goals of therapy, but is neutral about his/her participation. ? Patient is unable to participate in goal setting and plan of care.     Thank you for this referral.  Ariel Mari, OTR/L  Time Calculation: 16 mins    Eval Complexity: History: HIGH Complexity : Extensive review of history including physical, cognitive and psychosocial history ; Examination: HIGH Complexity : 5 or more performance deficits relating to physical, cognitive , or psychosocial skils that result in activity limitations and / or participation restrictions; Decision Making:HIGH Complexity : Patient presents with comorbidities that affect occupational performance.  Signifigant modification of tasks or assistance (eg, physical or verbal) with assessment (s) is necessary to enable patient to complete evaluation

## 2019-10-21 NOTE — PROGRESS NOTES
attended the interdisciplinary rounds for Jono Butler, who is a 81 y.o.,female. Patients Primary Language is: [place]. According to the patients EMR [facility] Affiliation is: No preference. The reason the Patient came to the hospital is:   Patient Active Problem List    Diagnosis Date Noted    Pulmonary edema cardiac cause ([address] 10/19/2019    COPD with acute exacerbation ([address] 10/19/2019    Respiratory failure requiring intubation ([address] 10/19/2019    Syncope 10/18/2019    Flash pulmonary edema ([address] 10/18/2019    Elevated d-dimer 10/18/2019    Acute on chronic respiratory failure with hypoxia ([address] 10/18/2019    Ischemic cardiomyopathy 09/09/2019    CAP (community acquired pneumonia) 08/26/2019    Combined systolic and diastolic congestive heart failure ([address] 08/26/2019    Type 2 diabetes mellitus with hyperglycemia ([address] 08/26/2019    TESSA on CPAP 08/26/2019    COPD exacerbation ([address] 08/25/2019    Type 2 diabetes with nephropathy ([address] 07/12/2018    Severe obesity (BMI 35.0-39. 9) with comorbidity ([address] 07/12/2018    Chest pain 06/09/2018    SOB (shortness of breath) 06/09/2018    Coronary artery disease involving native coronary artery with unstable angina pectoris ([address] 06/09/2018    Pulmonary edema 06/08/2018    Coronary artery disease involving native coronary artery of native heart without angina pectoris 05/31/2018    STEMI (ST elevation myocardial infarction) ([address] 05/20/2018    Acute pulmonary edema ([address] 05/20/2018    Caregiver stress 04/26/2017    Bilateral shoulder pain 09/27/2016    Elevated alkaline phosphatase level 09/27/2016    Chronic obstructive pulmonary disease ([address] 05/17/2016    Compliance with medication regimen 05/17/2016    Chronic pain of both knees 04/13/2016    Chronic pain syndrome 04/13/2016    Primary osteoarthritis of both knees 04/13/2016    Iron deficiency anemia 10/22/2015    Decreased GFR 10/22/2015    Dyslipidemia, goal LDL below 70 10/22/2015    Former smoker 10/22/2015    Osteoarthritis of both knees     Essential hypertension 09/17/2015    COPD (chronic obstructive pulmonary disease) ([facility] 09/01/2015    Diabetes mellitus type 2, insulin dependent ([facility] 07/02/2015    Environmental allergies 04/01/2015    Cardiomyopathy, dilated ([facility] 12/31/2014        Plan:  Chaplains will continue to follow and will provide pastoral care on an as needed/requested basis.  recommends bedside caregivers page  on duty if patient shows signs of acute spiritual or emotional distress.     [address] [facility]  Board Certified [address] [facility]   [phone]

## 2019-10-21 NOTE — PROGRESS NOTES
Problem: Falls - Risk of  Goal: *Absence of Falls  Description  Document Starla Jacinto Fall Risk and appropriate interventions in the flowsheet. Outcome: Progressing Towards Goal  Note:   Fall Risk Interventions:       Mentation Interventions: Bed/chair exit alarm, Door open when patient unattended, Adequate sleep, hydration, pain control, Eyeglasses and hearing aids, Gait belt with transfers/ambulation, Increase mobility, More frequent rounding, Reorient patient, Toileting rounds, Update white board    Medication Interventions: Bed/chair exit alarm, Assess postural VS orthostatic hypotension, Teach patient to arise slowly, Patient to call before getting OOB    Elimination Interventions: Bed/chair exit alarm, Call light in reach, Toileting schedule/hourly rounds    History of Falls Interventions: Bed/chair exit alarm, Door open when patient unattended, Evaluate medications/consider consulting pharmacy, Investigate reason for fall, Utilize gait belt for transfer/ambulation         Problem: Patient Education: Go to Patient Education Activity  Goal: Patient/Family Education  Outcome: Progressing Towards Goal     Problem: Pressure Injury - Risk of  Goal: *Prevention of pressure injury  Description  Document Vladislav Scale and appropriate interventions in the flowsheet. Outcome: Progressing Towards Goal  Note:   Pressure Injury Interventions:  Sensory Interventions: Assess need for specialty bed, Avoid rigorous massage over bony prominences, Discuss PT/OT consult with provider, Keep linens dry and wrinkle-free, Maintain/enhance activity level, Minimize linen layers, Monitor skin under medical devices, Pressure redistribution bed/mattress (bed type), Turn and reposition approx.  every two hours (pillows and wedges if needed)         Activity Interventions: Increase time out of bed, Pressure redistribution bed/mattress(bed type), PT/OT evaluation    Mobility Interventions: Float heels, HOB 30 degrees or less, Pressure redistribution bed/mattress (bed type), PT/OT evaluation, Turn and reposition approx.  every two hours(pillow and wedges)    Nutrition Interventions: Document food/fluid/supplement intake    Friction and Shear Interventions: Apply protective barrier, creams and emollients, Foam dressings/transparent film/skin sealants, HOB 30 degrees or less, Lift sheet, Transferring/repositioning devices                Problem: Patient Education: Go to Patient Education Activity  Goal: Patient/Family Education  Outcome: Progressing Towards Goal     Problem: Patient Education: Go to Patient Education Activity  Goal: Patient/Family Education  Outcome: Progressing Towards Goal     Problem: Patient Education: Go to Patient Education Activity  Goal: Patient/Family Education  Outcome: Progressing Towards Goal     Problem: Patient Education: Go to Patient Education Activity  Goal: Patient/Family Education  Outcome: Progressing Towards Goal

## 2019-10-21 NOTE — PROGRESS NOTES
conducted a Follow up consultation and Spiritual Assessment for Wendie Farr, who is a 81 y.o.,female. The  provided the following Interventions:  Continued the relationship of care and support. Listened empathically. Chart reviewed. The following outcomes were achieved:  Patient expressed gratitude for 's visit. Assessment:  There are no further spiritual or Sikhism issues which require Spiritual Care Services interventions at this time. Plan:  Chaplains will continue to follow and will provide pastoral care on an as needed/requested basis.  recommends bedside caregivers page  on duty if patient shows signs of acute spiritual or emotional distress.      [address] [facility] Certified [address] [place] [address]   [phone]

## 2019-10-21 NOTE — PROGRESS NOTES
Cardiology associates    CARDIOLOGY PROGRESS NOTE     Impressions:   Respiratory Failure s/p Mechanical Ventilation - now extubated   Syncope   · CT Head: No acute findings. Coronary artery disease Neg ACS  · 8/28/19 NST: Left ventricular function post-stress was abnormal. Wall motion was abnormal at stress as described below in the wall scoring diagram. Calculated ejection fraction is 44%. The TID ratio is 0.94. Left ventricular perfusion is abnormal.   Perfusion defect 1: There is a left ventricular perfusion defect that is moderate in size with a severe reduction in uptake affecting the mid-apical anterior and apex location(s) that is non-reversible. There is abnormal wall motion in the defect area. Viability in the area is poor. The defect appears to be infarction. Perfusion defect was visually present without quantitative evidence. · s/p YADIRA to proximal LAD with RESOLUTE stent 5/20/2018.  Acute stent thrombosis shortly after initial PCI on 5/20/2018 requiring emergency intubation for pulmonary edema and repeat cardiac cath and additional PCI/YADIRA to prox LAD.   Chronic Heart failure with mild systolic dysfunction exacerbation-improved very well clinically.    Hx of Ischemic cardiomyopathy- with EF 35% on echo 5/21/2018  with elevated pulmonary pressures at 44mm Hg- now with mild systolic Dysfunction Recent Echo 8/7/19 showed EF% 45%-50%  Hypertension- stable  on Entresto and bb   DM Type II- medications per medical team   Dyslipidemia- on Lipitor 20 mg. LDL 80 on 8/19   COPD exacerbation- managed by medical team   Hx of Tobacco abuse with   1 ppd X 50 years- patient quit in 2018  Obesity- weight loss advised  TESSA- uses CPAP at night   Urinary tract infection  Plan:   Repeat echo revealed EF 46%. Now has CARA- agree with holding lasix and resume when renal indices improve. Will f/u      8/7/19 Echocardiogram:  · Left Ventricle: Normal cavity size and wall thickness. Mild systolic dysfunction. Estimated left ventricular ejection fraction is 46 - 50%. Abnormal left ventricular wall motion. Moderate (grade 2) left ventricular diastolic dysfunction. · Left Atrium: Moderately dilated left atrium. · Aortic Valve: Aortic valve sclerosis. · Mitral Valve: Mitral valve thickening. Moderate to severe mitral valve regurgitation. · Tricuspid Valve: Mild tricuspid valve regurgitation is present. Pulmonary arterial systolic pressure is 43.7 mmHg. Moderate pulmonary hypertension. · Pulmonic Valve: Mild pulmonic valve regurgitation is present. · IVC/Hepatic Veins: Severely elevated central venous pressure (15+ mmHg); IVC diameter is larger than 21 mm and collapses less than 50% with respiration.       ASSESSMENT:  Hospital Problems  Date Reviewed: 10/11/2019          Codes Class Noted POA    Pulmonary edema cardiac cause ([facility] ICD-10-CM: I50.1  ICD-9-CM: 549  10/19/2019 Unknown        COPD with acute exacerbation ([facility] ICD-10-CM: J44.1  ICD-9-CM: 491.21  10/19/2019 Unknown        Respiratory failure requiring intubation ([facility] ICD-10-CM: J96.90  ICD-9-CM: 518.81  10/19/2019 Unknown        Syncope ICD-10-CM: R55  ICD-9-CM: 780.2  10/18/2019 Unknown        Flash pulmonary edema ([facility] ICD-10-CM: J81.0  ICD-9-CM: 518.4  10/18/2019 Unknown        Elevated d-dimer ICD-10-CM: R79.89  ICD-9-CM: 790.92  10/18/2019 Unknown        Acute on chronic respiratory failure with hypoxia [facility]) ICD-10-CM: J96.21  ICD-9-CM: 518.84, 799.02  10/18/2019 Unknown        COPD exacerbation ([facility] ICD-10-CM: J44.1  ICD-9-CM: 491.21  8/25/2019 Unknown                SUBJECTIVE:  Has cp while coughing       OBJECTIVE:    VS:   Visit Vitals  BP (!) 129/112 (BP 1 Location: Left arm, BP Patient Position: At rest)   Pulse (!) 103   Temp 98.6 °F (37 °C)   Resp 25   Ht 5' 4\" (1.626 m)   Wt 103.4 kg (228 lb)   SpO2 93%   Breastfeeding?  No   BMI 39.14 kg/m²         Intake/Output Summary (Last 24 hours) at 10/21/2019 9085  Last data filed at 10/21/2019 1300  Gross per 24 hour   Intake 250 ml   Output 1005 ml   Net -755 ml     TELE: SR    General: No acute distress  HENT: Normocephalic, atraumatic. Neck :  Supple  Cardiac:  Normal S1/S2  Chest/Lungs:b/l diffuse rhonchi  Abdomen: Soft  Extremities:  No edema      Labs: Results:       Chemistry Recent Labs     10/21/19  0243 10/20/19  0423 10/19/19  2040 10/19/19  0125 10/18/19  1715   * 179*  --  151* 141*    142  --  142 143   K 4.4 4.5  --  4.3 4.1    111  --  111 113*   CO2 24 21  --  18* 24   BUN 46* 30*  --  20* 21*   CREA 2.03* 1.61*  --  1.53* 1.49*   CA 8.6 8.5 8.7 8.3* 9.1   MG 2.1 2.0  --  1.9  --    PHOS 3.9 4.3  --  3.7  --    AGAP 6 10  --  13 6   BUCR 23* 19  --  13 14   AP  --   --   --   --  130*   TP  --   --   --   --  6.9   ALB  --   --   --   --  3.2*   GLOB  --   --   --   --  3.7   AGRAT  --   --   --   --  0.9      CBC w/Diff Recent Labs     10/21/19  0243 10/20/19  0423 10/19/19  0125   WBC 10.2 9.2 9.5   RBC 3.45* 3.54* 4.16*   HGB 8.5* 8.8* 10.5*   HCT 28.4* 29.1* 35.0    332 308   GRANS 78* 89* 91*   LYMPH 14* 7* 8*   EOS 0 0 0      Cardiac Enzymes Recent Labs     10/20/19  1835 10/20/19  1230    120   CKND1 CALCULATION NOT PERFORMED WHEN RESULT IS BELOW LINEAR LIMIT CALCULATION NOT PERFORMED WHEN RESULT IS BELOW LINEAR LIMIT      Coagulation Recent Labs     10/19/19  0125   PTP 13.2   INR 1.0   APTT 28.5       Lipid Panel Lab Results   Component Value Date/Time    Cholesterol, total 152 10/10/2019 12:00 AM    HDL Cholesterol 46 10/10/2019 12:00 AM    LDL, calculated 86 10/10/2019 12:00 AM    VLDL, calculated 20 10/10/2019 12:00 AM    Triglyceride 101 10/10/2019 12:00 AM    CHOL/HDL Ratio 5.6 (H) 05/21/2018 06:23 AM      BNP No results for input(s): BNPP in the last 72 hours.    Liver Enzymes Recent Labs     10/18/19  1715   TP 6.9   ALB 3.2*   *   SGOT 8*      Digoxin    Thyroid Studies Lab Results   Component Value Date/Time    TSH 0.47 10/19/2019 08:40 PM              Moraima Morgan MD

## 2019-10-21 NOTE — ROUTINE PROCESS
Bedside and Verbal shift change report given to Bobby Garcia RN (oncoming nurse) by Yazmin Mejia RN (offgoing nurse). Report included the following information SBAR, Kardex, ED Summary, Intake/Output, MAR, Recent Results and Cardiac Rhythm Sinus Tach.

## 2019-10-21 NOTE — PROGRESS NOTES
Progress Note    76y F with PMH HTN, CKD, COPD, heart failure, admitted for respiratory failure, seen for renal insufficieny  Subjective      Complaining chest pain   Breathing is okay   Received one dose lasix yesterday , urine output about 2.5L    IMPRESSION:   CARA,   CKD 3, baseline creatinine arorund 1.4  Heavy proteinuria  Respiratory failure, acute, intubated  Heart failure  COPD  Anemia   Secondary hyperparathyroid of CKD   PLAN:   She has mild jumped in creatinine today, will hold further diuresis. Caution with nsaids use, monitor renal function . Adjust meds per current renal function status.     Discussed with Dr. Arianna Duke Facility-Administered Medications   Medication Dose Route Frequency    melatonin tablet 10 mg  10 mg Oral QHS    lidocaine 4 % patch 1 Patch  1 Patch TransDERmal Q24H    oxyCODONE-acetaminophen (PERCOCET) 5-325 mg per tablet 1 Tab  1 Tab Oral ONCE PRN    diclofenac (VOLTAREN) 1 % topical gel 2 g  2 g Topical QID    influenza vaccine 2019-20 (6 mos+)(PF) (FLUARIX/FLULAVAL/FLUZONE QUAD) injection 0.5 mL  0.5 mL IntraMUSCular PRIOR TO DISCHARGE    aspirin chewable tablet 81 mg  81 mg Oral DAILY    predniSONE (DELTASONE) tablet 40 mg  40 mg Oral DAILY WITH BREAKFAST    carvedilol (COREG) tablet 6.25 mg  6.25 mg Oral BID WITH MEALS    insulin lispro (HUMALOG) injection   SubCUTAneous AC&HS    heparin (porcine) injection 5,000 Units  5,000 Units SubCUTAneous Q8H    sodium chloride (NS) flush 5-40 mL  5-40 mL IntraVENous Q8H    sodium chloride (NS) flush 5-40 mL  5-40 mL IntraVENous PRN    acetaminophen (TYLENOL) tablet 650 mg  650 mg Oral Q6H PRN    ondansetron (ZOFRAN) injection 4 mg  4 mg IntraVENous Q6H PRN    glucose chewable tablet 16 g  4 Tab Oral PRN    glucagon (GLUCAGEN) injection 1 mg  1 mg IntraMUSCular PRN    dextrose (D50W) injection syrg 12.5-25 g  25-50 mL IntraVENous PRN    budesonide (PULMICORT) 500 mcg/2 ml nebulizer suspension  500 mcg Nebulization BID RT    albuterol-ipratropium (DUO-NEB) 2.5 MG-0.5 MG/3 ML  3 mL Nebulization Q4H RT    famotidine (PF) (PEPCID) 20 mg in sodium chloride 0.9% 10 mL injection  20 mg IntraVENous M13T    multivit-folic acid-herbal 558 (WELLESSE PLUS) oral liquid 30 mL  30 mL Per NG tube DAILY    cefTRIAXone (ROCEPHIN) 2 g in sterile water (preservative free) 20 mL IV syringe  2 g IntraVENous Q24H    azithromycin (ZITHROMAX) 500 mg in  mL  500 mg IntraVENous Q24H       Review of Systems:     Not able to provide  Data Review:    Labs: Results:       Chemistry Recent Labs     10/21/19  0243 10/20/19  0423 10/19/19  2040 10/19/19  0125 10/18/19  1715   * 179*  --  151* 141*    142  --  142 143   K 4.4 4.5  --  4.3 4.1    111  --  111 113*   CO2 24 21  --  18* 24   BUN 46* 30*  --  20* 21*   CREA 2.03* 1.61*  --  1.53* 1.49*   CA 8.6 8.5 8.7 8.3* 9.1   AGAP 6 10  --  13 6   BUCR 23* 19  --  13 14   AP  --   --   --   --  130*   TP  --   --   --   --  6.9   ALB  --   --   --   --  3.2*   GLOB  --   --   --   --  3.7   AGRAT  --   --   --   --  0.9      CBC w/Diff Recent Labs     10/21/19  0243 10/20/19  0423 10/19/19  0125   WBC 10.2 9.2 9.5   RBC 3.45* 3.54* 4.16*   HGB 8.5* 8.8* 10.5*   HCT 28.4* 29.1* 35.0    332 308   GRANS 78* 89* 91*   LYMPH 14* 7* 8*   EOS 0 0 0      Coagulation Recent Labs     10/19/19  0125   PTP 13.2   INR 1.0   APTT 28.5       Iron/Ferritin Recent Labs     10/19/19  2040   IRON 19*      BNP No results for input(s): BNPP in the last 72 hours.    Cardiac Enzymes Recent Labs     10/20/19  1835 10/20/19  1230    120   CKND1 CALCULATION NOT PERFORMED WHEN RESULT IS BELOW LINEAR LIMIT CALCULATION NOT PERFORMED WHEN RESULT IS BELOW LINEAR LIMIT      Liver Enzymes Recent Labs     10/18/19  1715   TP 6.9   ALB 3.2*   *   SGOT 8*      Thyroid Studies Lab Results   Component Value Date/Time    TSH 0.47 10/19/2019 08:40 PM         EKG: sinus     Physical Assessment:     Visit Vitals  BP 95/75   Pulse (!) 110   Temp 97.9 °F (36.6 °C)   Resp 23   Ht 5' 4\" (1.626 m)   Wt 103.4 kg (228 lb)   SpO2 93%   Breastfeeding?  No   BMI 39.14 kg/m²     Weight change:     Intake/Output Summary (Last 24 hours) at 10/21/2019 1250  Last data filed at 10/21/2019 0700  Gross per 24 hour   Intake 250 ml   Output 1625 ml   Net -1375 ml     Physical Exam:   General: no acute respiratory distress   HEENT  supple neck, no thyromegaly  CVS: S1S2 heard,  no rub  RS: + air entry b/l,   Abd: Soft, Non tender,   Neuro: intubated, sedated  Extrm: + edema, no cyanosis, clubbing   Skin: no visible  Rash  Musculoskeletal: No gross joints or bone deformities     Procedures/imaging: see electronic medical records for all procedures, Xrays and details which were not copied into this note but were reviewed prior to creation of Lesvia Ross MD  October 21, 2019  [facility]  Office [phone]

## 2019-10-21 NOTE — PROGRESS NOTES
Problem: Mobility Impaired (Adult and Pediatric)  Goal: *Acute Goals and Plan of Care (Insert Text)  Description  Physical Therapy Goals  Initiated 10/21/2019 and to be accomplished within 7 day(s)  1. Patient will move from supine to sit and sit to supine  in bed with modified independence. 2.  Patient will transfer from bed to chair and chair to bed with supervision/set-up using the least restrictive device. 3.  Patient will perform sit to stand with supervision/set-up. 4.  Patient will ambulate with supervision/set-up for 100 feet with the least restrictive device. 5.  Patient will ascend/descend 3 stairs with 1 handrail(s) with minimal assistance/contact guard assist.    PLOF: pt reports ambulating with a cane primarily but also has a rollator   Outcome: Progressing Towards Goal   PHYSICAL THERAPY EVALUATION    Patient: Antony Rosas (71 y.o. female)  Date: 10/21/2019  Primary Diagnosis: Syncope [R55]  COPD exacerbation ([facility] [address] [J44.1]  Acute on chronic respiratory failure with hypoxia (HCC) [J96.21]  Flash pulmonary edema (HCC) [J81.0]  Elevated d-dimer [R79.89]  COPD with acute exacerbation (HCC) [J44.1]  Respiratory failure requiring intubation (HCC) [J96.90]  Pulmonary edema cardiac cause ([facility] [address] [I50.1]        Precautions: fall       PLOF: see above    ASSESSMENT :  Based on the objective data described below, the patient presents with decrease strength, balance and activity tolerance and increased chest pain resulting in decreased independence with functional mobility. Pt was cleared by nursing to participate in evaluation and nurse stated that patient was complaining of chest pain however it did not appear cardiac in nature and patient was declining medication. Pt initially complained of no pain however when instructed to move began screaming that she was in pain. Pt transferred to edge of bed with min A.   Pt stood with CGA however then began complaining of increased pain and was not attempting to maintain her balance. Pt was returned to sitting and then to supine. Pt was left with needs in reach and nursing notified. Patient will benefit from skilled intervention to address the above impairments. Patient's rehabilitation potential is considered to be Fair  Factors which may influence rehabilitation potential include:   ? None noted  ? Mental ability/status  ? Medical condition  ? Home/family situation and support systems  ? Safety awareness  ? Pain tolerance/management  ? Other:      PLAN :  Recommendations and Planned Interventions:   ?           Bed Mobility Training             ? Neuromuscular Re-Education  ? Transfer Training                   ? Orthotic/Prosthetic Training  ? Gait Training                          ? Modalities  ? Therapeutic Exercises           ? Edema Management/Control  ? Therapeutic Activities            ? Family Training/Education  ? Patient Education  ? Other (comment):    Frequency/Duration: Patient will be followed by physical therapy 1-2 times per day/4-7 days per week to address goals. Discharge Recommendations: Skilled Nursing Facility  Further Equipment Recommendations for Discharge: N/A     SUBJECTIVE:   Patient stated I can't do this.     OBJECTIVE DATA SUMMARY:     Past Medical History:   Diagnosis Date    Abnormal WBC count 5/17/2016    Bilateral shoulder pain 9/27/2016    Chondromalacia of both patellae     COPD (chronic obstructive pulmonary disease) ([facility] [address] 9/2015    mild-mod dz; Dr Knight Fitting    Diabetes [facility]) 2013    Diabetic eye exam ([facility] [address] 2016    Dilated cardiomyopathy ([facility] [address]     Dyslipidemia     Gout     Heart attack ([facility] [address]     [facility]     History of echocardiogram 11/14/2014    Mild LVE. EF 40%. Mild, diffuse hypk. Mild LAE. Mild MR.       Hypercholesteremia 1/08/14    Hypertension 2000    Noncompliance with medications 2/16/2016    Obesity     Orthostatic hypotension 5/17/2016    Osteoarthritis of both knees     Pain management 04/01/2016    Dr. Tamera Garcia     Popliteal cyst, bilateral      Vitamin D deficiency 10/16/14     Past Surgical History:   Procedure Laterality Date    HX HEART CATHETERIZATION      HX TUBAL LIGATION       Barriers to Learning/Limitations: yes;  emotional  Compensate with: Verbal Cues  Home Situation:  Home Situation  Home Environment: Private residence  One/Two Story Residence: Other (Comment)  Living Alone: No  Support Systems: Spouse/Significant Other/Partner  Patient Expects to be Discharged to[de-identified] Private residence  Current DME Used/Available at Home: Other (comment)(unknown)  Critical Behavior:  Neurologic State: Alert  Orientation Level: Oriented X4  Cognition: Follows commands      Strength:    Strength: Generally decreased, functional(B LEs)   Tone & Sensation:   Tone: Normal(B LEs)   Range Of Motion:  AROM: Within functional limits(B LEs)   Functional Mobility:  Bed Mobility:  Rolling: Contact guard assistance  Supine to Sit: Minimum assistance  Sit to Supine: Minimum assistance     Transfers:  Sit to Stand: Contact guard assistance  Stand to Sit: Contact guard assistance     Balance:   Sitting: Intact  Standing: With support  Standing - Static: (fair-)     Therapeutic Exercises: Ankle pumps  Pain:  Pain level pre-treatment: 0/10   Pain level post-treatment: 8/10   Pain Intervention(s) :  Rest,  Response to intervention: Nurse notified,  Activity Tolerance:   poor  Please refer to the flowsheet for vital signs taken during this treatment. After treatment:   ?         Patient left in no apparent distress sitting up in chair  ? Patient left in no apparent distress in bed  ? Call bell left within reach  ? Nursing notified  ? Caregiver present  ? Bed alarm activated  ?          SCDs applied    COMMUNICATION/EDUCATION:   ?         Role of Physical Therapy in the acute care setting. ?         Fall prevention education was provided and the patient/caregiver indicated understanding. ? Patient/family have participated as able in goal setting and plan of care. ?         Patient/family agree to work toward stated goals and plan of care. ?         Patient understands intent and goals of therapy, but is neutral about his/her participation. ? Patient is unable to participate in goal setting/plan of care: ongoing with therapy staff. ?         Other:     Thank you for this referral.  Inrgid Gimenez, PT   Time Calculation: 15 mins      Eval Complexity: History: HIGH Complexity :3+ comorbidities / personal factors will impact the outcome/ POC Exam:MEDIUM Complexity : 3 Standardized tests and measures addressing body structure, function, activity limitation and / or participation in recreation  Presentation: MEDIUM Complexity : Evolving with changing characteristics  Clinical Decision Making:Medium Complexity    Overall Complexity:MEDIUM

## 2019-10-21 NOTE — PROGRESS NOTES
[facility]  Pulmonary, Critical Care, and Sleep Medicine    Name: Bernice Gates MRN: [id number]   : 1950 Hospital: [facility]   Date: 10/21/2019        Critical Care Progress Note    Patient being followed in ICU for acute hypoxic resp failure requiring intubation, now extubated. IMPRESSION:   · Acute hypoxic resp failure, likely multifactorial due to acute combined CHF exacerbation, PNA, probably less likely COPD given PFTs in past with restrictive pattern  · Intermittent hypertension and tachycardia  · Acute encephalopathy, etiology likely secondary to ICU delirium. · Non-cardiac, reproducible MSK chest pain -- likely costochondritis, cardiac workup unremarkable  · Acute on chronic combined CHF with moderate to severe MR  · CARA on CKD3-4  · Other co-morbids: CAD, DM2, HTN, HLD, obesity, former smoker  · Obesity: Body mass index is 39.14 kg/m².   ·      Patient Active Problem List   Diagnosis Code    Cardiomyopathy, dilated ([facility] [facility] I42.0    Environmental allergies Z91.09    Diabetes mellitus type 2, insulin dependent ([facility] E11.9, Z79.4    COPD (chronic obstructive pulmonary disease) ([facility]) J44.9    Essential hypertension I10    Iron deficiency anemia D50.9    Decreased GFR R94.4    Dyslipidemia, goal LDL below 70 E78.5    Former smoker Z87.891    Osteoarthritis of both knees M17.0    Chronic pain of both knees M25.561, M25.562, G89.29    Chronic pain syndrome G89.4    Primary osteoarthritis of both knees M17.0    Chronic obstructive pulmonary disease ([facility]) J44.9    Compliance with medication regimen Z91.89    Bilateral shoulder pain M25.511, M25.512    Elevated alkaline phosphatase level R74.8    Caregiver stress Z63.6    STEMI (ST elevation myocardial infarction) ([facility]) I21.3    Acute pulmonary edema ([facility]) J81.0    Coronary artery disease involving native coronary artery of native heart without angina pectoris I25.10    Pulmonary edema J81.1    Chest pain R07.9    SOB (shortness of breath) R06.02    Coronary artery disease involving native coronary artery with unstable angina pectoris ([facility]) I25.110    Type 2 diabetes with nephropathy ([facility]) E11.21    Severe obesity (BMI 35.0-39. 9) with comorbidity ([facility]) E66.01    COPD exacerbation ([facility]) J44.1    CAP (community acquired pneumonia) J18.9    Combined systolic and diastolic congestive heart failure ([facility]) I50.40    Type 2 diabetes mellitus with hyperglycemia ([facility]) E11.65    TESSA on CPAP G47.33, Z99.89    Ischemic cardiomyopathy I25.5    Syncope R55    Flash pulmonary edema ([facility]) J81.0    Elevated d-dimer R79.89    Acute on chronic respiratory failure with hypoxia ([facility]) J96.21    Pulmonary edema cardiac cause ([facility]) I50.1    COPD with acute exacerbation ([facility]) J44.1    Respiratory failure requiring intubation ([facility]) J96.90        RECOMMENDATIONS:   · Cont O2, wean as able  · Close eye on volume status, resume diuretic as needed  · Cont meds for heart failure  · Cont abx for CAP  · Cont AECOPD therapy although doubt this is the issue  · Cont voltaren for chest pain, added low dose muscle relaxer to help with sleep  · SQH, no indication for SUP  · Can consider non-con CT chest to assess for possible rib fracture if right sided pain still persistes  · Transfer to floor  · Code Status: FULL       Subjective/History:   10/21/19  Patient seen and examined at bedside. No acute events overnight. Doing well. Has complaints of pleuritic type CP. Otherwise doing well. No SOB. HPI:  Patient is a 81 y.o. female with PMH Significant for CAD, CMO, DM, HTN, Renal Insufficiency, HLD, COPD, & Former smoker whom presented to [facility] ED this evening with C/o SOB x 1 week. Per notes, she stated that her shortness of breath would cause her to become dizzy & 2 days prior she actually had an syncopal episode. Denies any head trauma.   She stated she has been compliant with her COPD inhaler & this would result in intermittent relief of her shortness of breath. She denied any CP. Work-up in ED included CT imaging of head, PCXR, & labs. Labs significant for elevated D-dimer (1.09), Cr 1.49, BNP 1645. Head CT negative. PCXR with No PTX & Volume overloaded appearance with Sm. B/L Pleural effusion's (> R). While in ED, patient used the commode & after developed shortness of breath. Placed on N/c with improvement in 02 sat's & decreased shortness of breath. While patient was receiving Steroids (for AECOPD S/Sx's), patient developed respiratory distress resulting in arrest.  Patient was intubated  & [facility] consulted for ICU Admission & MV Mgmt. In addition, CTA Chest performed to R/o PE.  CTA Confirmed no PE, however B/L Lung consolidation c/w PNA with element of fluid overload. Based on CTA results, Pulmonary edema & pulmonary infiltrates identified by Dr. Jade Jackson. D/w HBV ED Provider & lasix IVP given. Upon arrival to ICU, patient sedated & intubated. Will obtain BC's & Sputum Cx's. Will begin AECOPD regimen, to include ABX, steroids, & Nebs. Will check serial CE's & BNP daily. The patient is critically ill and can not provide additional history due to Ventilated.          Current Facility-Administered Medications   Medication Dose Route Frequency    melatonin tablet 10 mg  10 mg Oral QHS    lidocaine 4 % patch 1 Patch  1 Patch TransDERmal Q24H    diclofenac (VOLTAREN) 1 % topical gel 2 g  2 g Topical QID    [START ON 10/22/2019] famotidine (PEPCID) tablet 20 mg  20 mg Oral DAILY    azithromycin (ZITHROMAX) tablet 500 mg  500 mg Oral Q24H    influenza vaccine 2019-20 (6 mos+)(PF) (FLUARIX/FLULAVAL/FLUZONE QUAD) injection 0.5 mL  0.5 mL IntraMUSCular PRIOR TO DISCHARGE    aspirin chewable tablet 81 mg  81 mg Oral DAILY    predniSONE (DELTASONE) tablet 40 mg  40 mg Oral DAILY WITH BREAKFAST    carvedilol (COREG) tablet 6.25 mg  6.25 mg Oral BID WITH MEALS    insulin lispro (HUMALOG) injection   SubCUTAneous AC&HS    heparin (porcine) injection 5,000 Units  5,000 Units SubCUTAneous Q8H    sodium chloride (NS) flush 5-40 mL  5-40 mL IntraVENous Q8H    budesonide (PULMICORT) 500 mcg/2 ml nebulizer suspension  500 mcg Nebulization BID RT    albuterol-ipratropium (DUO-NEB) 2.5 MG-0.5 MG/3 ML  3 mL Nebulization Q4H RT    multivit-folic acid-herbal 404 (WELLESSE PLUS) oral liquid 30 mL  30 mL Per NG tube DAILY    cefTRIAXone (ROCEPHIN) 2 g in sterile water (preservative free) 20 mL IV syringe  2 g IntraVENous Q24H     Review of Systems:  Pertinent items are noted in HPI. Objective:   Vital Signs:    Visit Vitals  BP (!) 129/112 (BP 1 Location: Left arm, BP Patient Position: At rest) Comment (BP 1 Location): Forearm   Pulse (!) 103   Temp 98.6 °F (37 °C)   Resp 25   Ht 5' 4\" (1.626 m)   Wt 103.4 kg (228 lb)   SpO2 93%   Breastfeeding? No   BMI 39.14 kg/m²       O2 Device: Nasal cannula   O2 Flow Rate (L/min): 3 l/min   Temp (24hrs), Av.4 °F (36.9 °C), Min:97.9 °F (36.6 °C), Max:98.6 °F (37 °C)       Intake/Output:   Last shift:      10/21 07 - 10/21 1900  In: -   Out: 275 [Urine:275]  Last 3 shifts: 10/19 1901 - 10/21 07  In: 1516.2 [I.V.:996.2]  Out: 2075 [Urine:2075]    Intake/Output Summary (Last 24 hours) at 10/21/2019 1701  Last data filed at 10/21/2019 1300  Gross per 24 hour   Intake 250 ml   Output 1005 ml   Net -755 ml     Physical Exam:     General:  Alert and oriented, comfortable on nasal cannula   Head:  Normocephalic, without obvious abnormality, atraumatic. Eyes:  Conjunctivae/corneas clear. PERRL, EOMI, sclera anicteric   Nose: Nares normal. Septum midline. Mucosa normal. No drainage or sinus tenderness. Neck: Supple, symmetrical, trachea midline, no adenopathy, no carotid bruit and no JVD. Lungs:   Symmetrical chest rise. CTAB, decreased bases without wheezes/rales/rhonchi   Heart:  RRR, S1, S2 normal, no m/r/g, +reproducible chest pain   Abdomen:   Soft, non-tender.  Bowel sounds hypoactive. No masses,  No organomegaly. Extremities: Extremities normal, atraumatic, no cyanosis. Trace B/L LE Edema   Pulses: 2+ and symmetric all extremities. Skin: Skin color, texture, turgor normal. No rashes or lesions   Neurologic: Alert and appropriate         Data:     Recent Results (from the past 24 hour(s))   CARDIAC PANEL,(CK, CKMB & TROPONIN)    Collection Time: 10/20/19  6:35 PM   Result Value Ref Range     26 - 192 U/L    CK - MB <1.0 <3.6 ng/ml    CK-MB Index  0.0 - 4.0 %     CALCULATION NOT PERFORMED WHEN RESULT IS BELOW LINEAR LIMIT    Troponin-I, QT <0.02 0.0 - 0.045 NG/ML   GLUCOSE, POC    Collection Time: 10/20/19 10:23 PM   Result Value Ref Range    Glucose (POC) 181 (H) 70 - 064 mg/dL   METABOLIC PANEL, BASIC    Collection Time: 10/21/19  2:43 AM   Result Value Ref Range    Sodium 141 136 - 145 mmol/L    Potassium 4.4 3.5 - 5.5 mmol/L    Chloride 111 100 - 111 mmol/L    CO2 24 21 - 32 mmol/L    Anion gap 6 3.0 - 18 mmol/L    Glucose 139 (H) 74 - 99 mg/dL    BUN 46 (H) 7.0 - 18 MG/DL    Creatinine 2.03 (H) 0.6 - 1.3 MG/DL    BUN/Creatinine ratio 23 (H) 12 - 20      GFR est AA 30 (L) >60 ml/min/1.73m2    GFR est non-AA 24 (L) >60 ml/min/1.73m2    Calcium 8.6 8.5 - 10.1 MG/DL   NT-PRO BNP    Collection Time: 10/21/19  2:43 AM   Result Value Ref Range    NT pro-BNP 3,696 (H) 0 - 900 PG/ML   CBC WITH AUTOMATED DIFF    Collection Time: 10/21/19  2:43 AM   Result Value Ref Range    WBC 10.2 4.6 - 13.2 K/uL    RBC 3.45 (L) 4.20 - 5.30 M/uL    HGB 8.5 (L) 12.0 - 16.0 g/dL    HCT 28.4 (L) 35.0 - 45.0 %    MCV 82.3 74.0 - 97.0 FL    MCH 24.6 24.0 - 34.0 PG    MCHC 29.9 (L) 31.0 - 37.0 g/dL    RDW 18.2 (H) 11.6 - 14.5 %    PLATELET 422 068 - 109 K/uL    MPV 9.6 9.2 - 11.8 FL    NEUTROPHILS 78 (H) 40 - 73 %    LYMPHOCYTES 14 (L) 21 - 52 %    MONOCYTES 8 3 - 10 %    EOSINOPHILS 0 0 - 5 %    BASOPHILS 0 0 - 2 %    ABS. NEUTROPHILS 8.0 1.8 - 8.0 K/UL    ABS. LYMPHOCYTES 1.4 0.9 - 3.6 K/UL    ABS. MONOCYTES 0.8 0.05 - 1.2 K/UL    ABS. EOSINOPHILS 0.0 0.0 - 0.4 K/UL    ABS.  BASOPHILS 0.0 0.0 - 0.1 K/UL    DF AUTOMATED     MAGNESIUM    Collection Time: 10/21/19  2:43 AM   Result Value Ref Range    Magnesium 2.1 1.6 - 2.6 mg/dL   PHOSPHORUS    Collection Time: 10/21/19  2:43 AM   Result Value Ref Range    Phosphorus 3.9 2.5 - 4.9 MG/DL   CALCIUM, IONIZED    Collection Time: 10/21/19  2:43 AM   Result Value Ref Range    Ionized Calcium 1.19 1.12 - 1.32 MMOL/L   GLUCOSE, POC    Collection Time: 10/21/19  7:24 AM   Result Value Ref Range    Glucose (POC) 134 (H) 70 - 110 mg/dL   DUPLEX CAROTID BILATERAL    Collection Time: 10/21/19  9:17 AM   Result Value Ref Range    Right CCA prox sys 81.1 cm/s    Right CCA prox carter 18.4 cm/s    Right cca dist sys 106.6 cm/s    Right CCA dist carter 18.3 cm/s    Right ICA prox sys 132.6 cm/s    Right ICA prox carter 24.1 cm/s    Right ICA mid sys 104.0 cm/s    Right ICA mid carter 18.2 cm/s    Right ICA dist sys 93.7 cm/s    Right ICA dist carter 26.3 cm/s    Right eca sys 306.0 cm/s    Right vertebral sys 97.2 cm/s    Right subclavian sys 124.5 cm/s    Left CCA prox sys 95.5 cm/s    Left CCA prox carter 18.5 cm/s    Left CCA dist sys 80.6 cm/s    Left CCA dist carter 17.5 cm/s    Left ICA prox sys 114.6 cm/s    Left ICA prox carter 24.1 cm/s    Left ICA mid sys 96.8 cm/s    Left ICA mid carter 22.5 cm/s    Left ICA dist sys 108.1 cm/s    Left ICA dist carter 25.7 cm/s    Left ECA sys 171.0 cm/s    Left vertebral sys 46.7 cm/s    Left subclavian sys 130.0 cm/s    LEFT COMMON CAROTID ARTERY MID S 108.2 cm/s    LEFT COMMON CAROTID ARTERY MID D 17.5 cm/s    RIGHT COMMON CAROTID ARTERY MID S 118.2 cm/s    RIGHT COMMON CAROTID ARTERY MID D 23.0 cm/s    RIGHT VERTEBRAL ARTERY D 16.4 cm/s    RIGHT EXTERNAL CAROTID ARTERY D 34.7 cm/s    LEFT VERTEBRAL ARTERY D 7.9 cm/s    LEFT EXTERNAL CAROTID ARTERY D 15.8 cm/s    RIGHT SUBCLAVIAN ARTERY D 8.9 cm/s   GLUCOSE, POC    Collection Time: 10/21/19 11:15 AM Result Value Ref Range    Glucose (POC) 205 (H) 70 - 110 mg/dL   GLUCOSE, POC    Collection Time: 10/21/19  3:56 PM   Result Value Ref Range    Glucose (POC) 181 (H) 70 - 110 mg/dL           Recent Labs     10/20/19  0655 10/19/19  0426 10/19/19  0125   FIO2I 50 50 100   HCO3I 20.9* 22.0 23.4   PCO2I 34.4* 34.6* 49.3*   PHI 7.394 7.412 7.285*   PO2I 94 66* 204*       Telemetry:normal sinus rhythm    Imaging:  I have personally reviewed the patients radiographs and have reviewed the reports. Balta Mckeon MD  PCCM Fellow      [facility] Staff Addendum     I have independently evaluated the patient and reviewed the patient's chart. I have discussed the findings and care plan with ICU Care Team. Care Plan reviewed on ICU milti-D rounds. I agree with the above evaluation, assessment and recommendations along with the following comments and observations. Patient with improved clinical status. No ongoing need for ICU. Plan to transfer to floor today.        Critical Care and time spent coordinating care, minus procedure time:  35 min    Melodie Jason DO, [facility]  Pulmonary, Sleep and Critical Care Medicine  5:29 PM

## 2019-10-21 NOTE — PROGRESS NOTES
Problem: Dysphagia (Adult)  Goal: *Acute Goals and Plan of Care (Insert Text)  Description  Patient will:  1. Tolerate PO trials with 0 s/s overt distress in 4/5 trials  2. Utilize compensatory swallow strategies/maneuvers (decrease bite/sip, size/rate, alt. liq/sol) with min cues in 4/5 trials    Rec:     Soft solid with thin liquids  Aspiration precautions  HOB >45 during po intake, remain >30 for 30-45 minutes after po   Small bites/sips; alternate liquid/solid with slow feeding rate   Oral care TID  Meds per pt preference         Outcome: Progressing Towards Goal    SPEECH LANGUAGE PATHOLOGY BEDSIDE SWALLOW EVALUATION/TREATMENT    Patient: Desire Bailey (15 y.o. female)  Date: 10/21/2019  Primary Diagnosis: Syncope [R55]  COPD exacerbation ([facility]) [J44.1]  Acute on chronic respiratory failure with hypoxia ([facility]) [J96.21]  Flash pulmonary edema ([facility]) [J81.0]  Elevated d-dimer [R79.89]  COPD with acute exacerbation ([facility]) [J44.1]  Respiratory failure requiring intubation ([facility]) [J96.90]  Pulmonary edema cardiac cause ([facility] [address] [I50.1]        Precautions: aspiration     PLOF: As per H&P    ASSESSMENT :  Based on the objective data described below, the patient presents with mild oropharyngeal dysphagia s/p extubation yesterday PM. She tolerated reg solid, puree, and thin liquid trials via straw with no overt s/sx of aspiration. Increased mastication observed secondary to edentulous status. Laryngeal elevation was weak/decreased to palpation. SOB observed both during PO trials and phonation. Rec soft solid diet with thin liquids, aspiration precautions, oral care TID, and meds as tolerated.  ST will continue to follow to ensure safety of PO.      TREATMENT :  Skilled therapy initiated; Educated pt on aspiration precautions and importance of compensatory swallow techniques to decrease aspiration risk (decrease rate of intake & sip/bite size, upright @HOB for all po intake and ~30 minutes after po); verbalized comprehension. Patient will benefit from skilled intervention to address the above impairments. Patient's rehabilitation potential is considered to be Good  Factors which may influence rehabilitation potential include:   ?            Medical condition     PLAN :  Recommendations and Planned Interventions: See above  Frequency/Duration: Patient will be followed by speech-language pathology 1-2 times per day/3-5 days per week to address goals. Discharge Recommendations: To Be Determined     SUBJECTIVE:   Patient stated I need someone to come in here and assess me. OBJECTIVE:     Past Medical History:   Diagnosis Date    Abnormal WBC count 5/17/2016    Bilateral shoulder pain 9/27/2016    Chondromalacia of both patellae     COPD (chronic obstructive pulmonary disease) ([facility] [address] 9/2015    mild-mod dz; Dr Erika Delgadillo    Diabetes [facility]) 2013    Diabetic eye exam ([facility] [address] 2016    Dilated cardiomyopathy ([facility] [address]     Dyslipidemia     Gout     Heart attack ([facility] [address]     Heart attack ([facility] [address]     History of echocardiogram 11/14/2014    Mild LVE. EF 40%. Mild, diffuse hypk. Mild LAE. Mild MR.       Hypercholesteremia 1/08/14    Hypertension 2000    Noncompliance with medications 2/16/2016    Obesity     Orthostatic hypotension 5/17/2016    Osteoarthritis of both knees     Pain management 04/01/2016    Dr. Bo Hale     Popliteal cyst, bilateral      Vitamin D deficiency 10/16/14     Past Surgical History:   Procedure Laterality Date    HX HEART CATHETERIZATION      HX TUBAL LIGATION       Prior Level of Function/Home Situation: see below  Home Situation  Home Environment: Private residence  One/Two Story Residence: Other (Comment)  Living Alone: No  Support Systems: Spouse/Significant Other/Partner  Patient Expects to be Discharged to[de-identified] Private residence  Current DME Used/Available at Home: Other (comment)(unknown)    Diet prior to admission: reg solid with thin  Current Diet:  soft solid with thin      Cognitive and Communication Status:  Neurologic State: Alert  Orientation Level: Oriented X4  Cognition: Follows commands  Oral Assessment:  Oral Assessment  Labial: No impairment  Dentition: Edentulous  Oral Hygiene: adequate  Lingual: No impairment  Velum: No impairment  Mandible: No impairment  P.O. Trials:  Patient Position: 60 at [facility]  Vocal quality prior to P.O.: No impairment  Consistency Presented: Thin liquid;Puree; Solid  How Presented: Self-fed/presented;Cup/sip;Spoon;Straw  Bolus Acceptance: No impairment  Bolus Formation/Control: Impaired  Type of Impairment: Delayed;Mastication  Propulsion: Delayed (# of seconds)  Oral Residue: None  Initiation of Swallow: No impairment  Laryngeal Elevation: Weak  Aspiration Signs/Symptoms: None  Pharyngeal Phase Characteristics: No impairment, issues, or problems   Effective Modifications: Small sips and bites  Cues for Modifications: Minimal     Oral Phase Severity: Mild  Pharyngeal Phase Severity : Mild    PAIN:  Start of Eval: 0  End of Eval: 0     After treatment:   ?            Patient left in no apparent distress sitting up in chair  ? Patient left in no apparent distress in bed  ? Call bell left within reach  ? Nursing notified  ? Family present  ? Caregiver present  ? Bed alarm activated    COMMUNICATION/EDUCATION:   ?            Aspiration precautions; swallow safety; compensatory techniques. ?            Patient/family have participated as able in goal setting and plan of care. ? Posted safety precautions in patient's room.     Thank you for this referral.    Zulema Nunes M.S. CCC-SLP/L  Speech-Language Pathologist

## 2019-10-21 NOTE — DIABETES MGMT
GLYCEMIC CONTROL AND NUTRITION    Assessment/Recommendations:  Fasting lab glucose this am 139 mg/dl  Continue corrective insulin coverage as needed  Will continue inpatient monitoring. Most recent blood glucose values:  Results for Ashley Jacobs (MRN [id number]) as of 10/21/2019 11:42   Ref. Range 10/20/2019 16:32 10/20/2019 22:23 10/21/2019 02:43 10/21/2019 07:24 10/21/2019 11:15   GLUCOSE,FAST - POC Latest Ref Range: 70 - 110 mg/dL 189 (H) 181 (H)  134 (H) 205 (H)     Current A1C of 6.6 % is equivalent to average blood glucose of 143 mg/dl over the past 2-3 months.     Current hospital diabetes medications:   Lispro corrective insulin coverage AC&HS  Previous day's insulin requirements:   Lispro 10 units corrective insulin  Home diabetes medications:  Per pta med list  Lantus 15 units daily  Diet:    Cardiac soft solids  Education:  ____Refer to Diabetes Education Record             _x__Education not indicated at this time      Jamal Laughlin [facility] CDE  Ext [phone]

## 2019-10-21 NOTE — PROGRESS NOTES
NUTRITION    Nursing Referral: [facility]  Nutrition Consult: Management of Tube Feeding      RECOMMENDATIONS / PLAN:     - Monitor po intake and diet tolerance. - Discontinue tube feeding.    - Continue RD inpatient monitoring and evaluation. NUTRITION INTERVENTIONS & DIAGNOSIS:     - Enteral nutrition: discontinue   - Meals/snacks: modified composition   - Collaboration and referral of nutrition care: interdisciplinary rounds     Nutrition Diagnosis: No nutrition diagnosis at this time. ASSESSMENT:     10/21: Extubated, OGT removed 10/20. Tube feeding stopped when extubated. Started on diet yesterday, tolerating with good appetite and meal intake. Discussed low sodium diet to follow at home. 10/19: Intubated for respiratory distress with OGT clamped. Hyperglycemia, receiving steroid. Hx of renal insufficiency. Electrolytes WNL.      Nutritional intake adequate to meet patients estimated nutritional needs:  Yes    Diet: DIET CARDIAC Soft Solids      Food Allergies: NKFA  Current Appetite: Good  Appetite/meal intake prior to admission: Good  Feeding Limitations:  [] Swallowing difficulty    [x] Chewing difficulty: edentulous, tolerating soft foods    [] Other:   Current Meal Intake:   Patient Vitals for the past 100 hrs:   % Diet Eaten   10/20/19 0400 0 %   10/20/19 0000 0 %   10/19/19 2000 0 %       BM: PTA  Skin Integrity: WDL  Edema:   [x] No     [] Yes   Pertinent Medications: Reviewed: SSI, MVI, zofran, steroid     Recent Labs     10/21/19  0243 10/20/19  0423 10/19/19  2040 10/19/19  0125 10/18/19  1715    142  --  142 143   K 4.4 4.5  --  4.3 4.1    111  --  111 113*   CO2 24 21  --  18* 24   * 179*  --  151* 141*   BUN 46* 30*  --  20* 21*   CREA 2.03* 1.61*  --  1.53* 1.49*   CA 8.6 8.5 8.7 8.3* 9.1   MG 2.1 2.0  --  1.9  --    PHOS 3.9 4.3  --  3.7  --    ALB  --   --   --   --  3.2*   SGOT  --   --   --   --  8*   ALT  --   --   --   --  13       Intake/Output Summary (Last 24 hours) at 10/21/2019 0932  Last data filed at 10/20/2019 2238  Gross per 24 hour   Intake 250 ml   Output 1175 ml   Net -925 ml       Anthropometrics:  Ht Readings from Last 1 Encounters:   10/19/19 5' 4\" (1.626 m)     Last 3 Recorded Weights in this Encounter    10/18/19 1718 10/19/19 0100 10/19/19 1115   Weight: 101.2 kg (223 lb) 103.4 kg (228 lb) 103.4 kg (228 lb)     Body mass index is 39.14 kg/m². Obese, Class II     Weight History: patient previously reported usual weight of 206 lb 5/2018 with weight gain PTA per chart hx review; pt reports fluctuations in weight PTA due to fluid accumulation, ranging 223-236 lb,      Weight Metrics 10/19/2019 10/11/2019 9/30/2019 9/10/2019 9/9/2019 8/28/2019 8/15/2019   Weight 228 lb 232 lb 216 lb 218 lb 12.8 oz 217 lb 12.8 oz 218 lb 8 oz 220 lb   BMI 39.14 kg/m2 39.82 kg/m2 37.08 kg/m2 37.56 kg/m2 37.39 kg/m2 37.51 kg/m2 37.76 kg/m2        Admitting Diagnosis: Syncope [R55]  COPD exacerbation (HCC) [J44.1]  Acute on chronic respiratory failure with hypoxia (HCC) [J96.21]  Flash pulmonary edema (HCC) [J81.0]  Elevated d-dimer [R79.89]  COPD with acute exacerbation (HCC) [J44.1]  Respiratory failure requiring intubation (HCC) [J96.90]  Pulmonary edema cardiac cause ([facility] [address] [I50.1]  Pertinent PMHx: CAD, COPD, DM, HTN, HLD, obesity     Education Needs:        [x] None identified  [] Identified - Not appropriate at this time  []  Identified and addressed - refer to education log  Learning Limitations:   [x] None identified  [] Identified:  Cultural, Hoahaoism & ethnic food preferences:  [x] None identified    [] Identified and addressed     ESTIMATED NUTRITION NEEDS:     Calories: 6062-6486 kcal (MSJx1-1.2) based on  [x] Actual  kg    [] IBW   Protein:  gm (0.8-1 gm/kg) based on  [x] Actual BW      [] IBW   Fluid: 1 mL/kcal      MONITORING & EVALUATION:     Nutrition Goal(s):   - PO nutrition intake will meet >75% of patient estimated nutritional needs within the next 7 days. Outcome: New/Initial goal     Monitoring:  [x] Food and nutrient intake   [x] Food and nutrient administration  [x] Comparative standards   [x] Nutrition-focused physical findings   [x] Anthropometric Measurements   [x] Treatment/therapy   [x] Biochemical data, medical tests, and procedures        Previous Recommendations (for follow-up assessments only): Implemented      Discharge Planning: No nutritional discharge needs at this time. Participated in care planning, discharge planning, & interdisciplinary rounds as appropriate.       Janeen Huggins RD, [address]   Pager: [phone]

## 2019-10-21 NOTE — ROUTINE PROCESS
0730 Bedside and Verbal shift change report given to Deloris Aleman (oncoming nurse) by JUDITH Chirinos (offgoing nurse). Report included the following information SBAR and Kardex.

## 2019-10-22 LAB
ANION GAP SERPL CALC-SCNC: 7 MMOL/L (ref 3–18)
BASOPHILS # BLD: 0 K/UL (ref 0–0.1)
BASOPHILS NFR BLD: 0 % (ref 0–2)
BNP SERPL-MCNC: 4929 PG/ML (ref 0–900)
BUN SERPL-MCNC: 49 MG/DL (ref 7–18)
BUN/CREAT SERPL: 28 (ref 12–20)
CA-I SERPL-SCNC: 1.17 MMOL/L (ref 1.12–1.32)
CALCIUM SERPL-MCNC: 9.1 MG/DL (ref 8.5–10.1)
CHLORIDE SERPL-SCNC: 106 MMOL/L (ref 100–111)
CO2 SERPL-SCNC: 26 MMOL/L (ref 21–32)
CREAT SERPL-MCNC: 1.76 MG/DL (ref 0.6–1.3)
DIFFERENTIAL METHOD BLD: ABNORMAL
EOSINOPHIL # BLD: 0 K/UL (ref 0–0.4)
EOSINOPHIL NFR BLD: 0 % (ref 0–5)
ERYTHROCYTE [DISTWIDTH] IN BLOOD BY AUTOMATED COUNT: 18.2 % (ref 11.6–14.5)
GLUCOSE BLD STRIP.AUTO-MCNC: 151 MG/DL (ref 70–110)
GLUCOSE BLD STRIP.AUTO-MCNC: 152 MG/DL (ref 70–110)
GLUCOSE BLD STRIP.AUTO-MCNC: 159 MG/DL (ref 70–110)
GLUCOSE BLD STRIP.AUTO-MCNC: 163 MG/DL (ref 70–110)
GLUCOSE BLD STRIP.AUTO-MCNC: 253 MG/DL (ref 70–110)
GLUCOSE SERPL-MCNC: 130 MG/DL (ref 74–99)
HCT VFR BLD AUTO: 31.7 % (ref 35–45)
HGB BLD-MCNC: 9.4 G/DL (ref 12–16)
LYMPHOCYTES # BLD: 1.3 K/UL (ref 0.9–3.6)
LYMPHOCYTES NFR BLD: 14 % (ref 21–52)
MAGNESIUM SERPL-MCNC: 2.1 MG/DL (ref 1.6–2.6)
MCH RBC QN AUTO: 24.4 PG (ref 24–34)
MCHC RBC AUTO-ENTMCNC: 29.7 G/DL (ref 31–37)
MCV RBC AUTO: 82.1 FL (ref 74–97)
MONOCYTES # BLD: 0.8 K/UL (ref 0.05–1.2)
MONOCYTES NFR BLD: 8 % (ref 3–10)
NEUTS SEG # BLD: 7.1 K/UL (ref 1.8–8)
NEUTS SEG NFR BLD: 78 % (ref 40–73)
PHOSPHATE SERPL-MCNC: 4.3 MG/DL (ref 2.5–4.9)
PLATELET # BLD AUTO: 386 K/UL (ref 135–420)
PMV BLD AUTO: 10.2 FL (ref 9.2–11.8)
POTASSIUM SERPL-SCNC: 4.1 MMOL/L (ref 3.5–5.5)
RBC # BLD AUTO: 3.86 M/UL (ref 4.2–5.3)
SODIUM SERPL-SCNC: 139 MMOL/L (ref 136–145)
WBC # BLD AUTO: 9.2 K/UL (ref 4.6–13.2)

## 2019-10-22 PROCEDURE — 74011250637 HC RX REV CODE- 250/637: Performed by: INTERNAL MEDICINE

## 2019-10-22 PROCEDURE — 74011250636 HC RX REV CODE- 250/636: Performed by: PHYSICIAN ASSISTANT

## 2019-10-22 PROCEDURE — 94762 N-INVAS EAR/PLS OXIMTRY CONT: CPT

## 2019-10-22 PROCEDURE — 83880 ASSAY OF NATRIURETIC PEPTIDE: CPT

## 2019-10-22 PROCEDURE — 97116 GAIT TRAINING THERAPY: CPT

## 2019-10-22 PROCEDURE — 74011000250 HC RX REV CODE- 250: Performed by: INTERNAL MEDICINE

## 2019-10-22 PROCEDURE — 77010033678 HC OXYGEN DAILY

## 2019-10-22 PROCEDURE — 85025 COMPLETE CBC W/AUTO DIFF WBC: CPT

## 2019-10-22 PROCEDURE — 65660000000 HC RM CCU STEPDOWN

## 2019-10-22 PROCEDURE — 80048 BASIC METABOLIC PNL TOTAL CA: CPT

## 2019-10-22 PROCEDURE — 84100 ASSAY OF PHOSPHORUS: CPT

## 2019-10-22 PROCEDURE — 74011000250 HC RX REV CODE- 250: Performed by: PHYSICIAN ASSISTANT

## 2019-10-22 PROCEDURE — 83735 ASSAY OF MAGNESIUM: CPT

## 2019-10-22 PROCEDURE — 74011250637 HC RX REV CODE- 250/637: Performed by: PHYSICIAN ASSISTANT

## 2019-10-22 PROCEDURE — 82330 ASSAY OF CALCIUM: CPT

## 2019-10-22 PROCEDURE — 36415 COLL VENOUS BLD VENIPUNCTURE: CPT

## 2019-10-22 PROCEDURE — 77030038269 HC DRN EXT URIN PURWCK BARD -A

## 2019-10-22 PROCEDURE — 74011250636 HC RX REV CODE- 250/636: Performed by: INTERNAL MEDICINE

## 2019-10-22 PROCEDURE — 94640 AIRWAY INHALATION TREATMENT: CPT

## 2019-10-22 PROCEDURE — 74011636637 HC RX REV CODE- 636/637: Performed by: INTERNAL MEDICINE

## 2019-10-22 PROCEDURE — 74011250637 HC RX REV CODE- 250/637: Performed by: STUDENT IN AN ORGANIZED HEALTH CARE EDUCATION/TRAINING PROGRAM

## 2019-10-22 PROCEDURE — 94660 CPAP INITIATION&MGMT: CPT

## 2019-10-22 PROCEDURE — 82962 GLUCOSE BLOOD TEST: CPT

## 2019-10-22 RX ORDER — PREDNISONE 20 MG/1
20 TABLET ORAL
Status: DISCONTINUED | OUTPATIENT
Start: 2019-10-23 | End: 2019-10-23

## 2019-10-22 RX ORDER — BUMETANIDE 0.5 MG/1
0.5 TABLET ORAL DAILY
Status: DISCONTINUED | OUTPATIENT
Start: 2019-10-22 | End: 2019-10-22

## 2019-10-22 RX ORDER — BUMETANIDE 1 MG/1
1 TABLET ORAL DAILY
Status: DISCONTINUED | OUTPATIENT
Start: 2019-10-23 | End: 2019-10-27 | Stop reason: HOSPADM

## 2019-10-22 RX ORDER — IPRATROPIUM BROMIDE AND ALBUTEROL SULFATE 2.5; .5 MG/3ML; MG/3ML
3 SOLUTION RESPIRATORY (INHALATION)
Status: DISCONTINUED | OUTPATIENT
Start: 2019-10-23 | End: 2019-10-27 | Stop reason: HOSPADM

## 2019-10-22 RX ADMIN — Medication 81 MG: at 09:15

## 2019-10-22 RX ADMIN — HEPARIN SODIUM 5000 UNITS: 5000 INJECTION INTRAVENOUS; SUBCUTANEOUS at 18:03

## 2019-10-22 RX ADMIN — WATER 2 G: 1 INJECTION INTRAMUSCULAR; INTRAVENOUS; SUBCUTANEOUS at 23:00

## 2019-10-22 RX ADMIN — Medication 10 ML: at 00:37

## 2019-10-22 RX ADMIN — IPRATROPIUM BROMIDE AND ALBUTEROL SULFATE 3 ML: .5; 3 SOLUTION RESPIRATORY (INHALATION) at 13:26

## 2019-10-22 RX ADMIN — IPRATROPIUM BROMIDE AND ALBUTEROL SULFATE 3 ML: .5; 3 SOLUTION RESPIRATORY (INHALATION) at 07:47

## 2019-10-22 RX ADMIN — MELATONIN TAB 5 MG 10 MG: 5 TAB at 00:34

## 2019-10-22 RX ADMIN — IPRATROPIUM BROMIDE AND ALBUTEROL SULFATE 3 ML: .5; 3 SOLUTION RESPIRATORY (INHALATION) at 04:59

## 2019-10-22 RX ADMIN — IPRATROPIUM BROMIDE AND ALBUTEROL SULFATE 3 ML: .5; 3 SOLUTION RESPIRATORY (INHALATION) at 23:12

## 2019-10-22 RX ADMIN — BUDESONIDE 500 MCG: 0.5 INHALANT RESPIRATORY (INHALATION) at 07:47

## 2019-10-22 RX ADMIN — INSULIN LISPRO 2 UNITS: 100 INJECTION, SOLUTION INTRAVENOUS; SUBCUTANEOUS at 21:48

## 2019-10-22 RX ADMIN — HEPARIN SODIUM 5000 UNITS: 5000 INJECTION INTRAVENOUS; SUBCUTANEOUS at 00:38

## 2019-10-22 RX ADMIN — MELATONIN TAB 5 MG 10 MG: 5 TAB at 21:49

## 2019-10-22 RX ADMIN — TIZANIDINE 2 MG: 2 TABLET ORAL at 09:24

## 2019-10-22 RX ADMIN — INSULIN LISPRO 6 UNITS: 100 INJECTION, SOLUTION INTRAVENOUS; SUBCUTANEOUS at 16:21

## 2019-10-22 RX ADMIN — Medication 30 ML: at 09:15

## 2019-10-22 RX ADMIN — AZITHROMYCIN 500 MG: 250 TABLET, FILM COATED ORAL at 00:34

## 2019-10-22 RX ADMIN — TIZANIDINE 2 MG: 2 TABLET ORAL at 21:50

## 2019-10-22 RX ADMIN — AZITHROMYCIN 500 MG: 250 TABLET, FILM COATED ORAL at 21:49

## 2019-10-22 RX ADMIN — IPRATROPIUM BROMIDE AND ALBUTEROL SULFATE 3 ML: .5; 3 SOLUTION RESPIRATORY (INHALATION) at 16:04

## 2019-10-22 RX ADMIN — IPRATROPIUM BROMIDE AND ALBUTEROL SULFATE 3 ML: .5; 3 SOLUTION RESPIRATORY (INHALATION) at 02:25

## 2019-10-22 RX ADMIN — Medication 10 ML: at 05:30

## 2019-10-22 RX ADMIN — IPRATROPIUM BROMIDE AND ALBUTEROL SULFATE 3 ML: .5; 3 SOLUTION RESPIRATORY (INHALATION) at 19:09

## 2019-10-22 RX ADMIN — Medication 10 ML: at 21:52

## 2019-10-22 RX ADMIN — HEPARIN SODIUM 5000 UNITS: 5000 INJECTION INTRAVENOUS; SUBCUTANEOUS at 09:15

## 2019-10-22 RX ADMIN — CARVEDILOL 6.25 MG: 12.5 TABLET, FILM COATED ORAL at 18:03

## 2019-10-22 RX ADMIN — BUMETANIDE 0.5 MG: 0.5 TABLET ORAL at 14:36

## 2019-10-22 RX ADMIN — PREDNISONE 40 MG: 20 TABLET ORAL at 09:14

## 2019-10-22 RX ADMIN — WATER 2 G: 1 INJECTION INTRAMUSCULAR; INTRAVENOUS; SUBCUTANEOUS at 00:37

## 2019-10-22 RX ADMIN — INSULIN LISPRO 2 UNITS: 100 INJECTION, SOLUTION INTRAVENOUS; SUBCUTANEOUS at 00:43

## 2019-10-22 RX ADMIN — CARVEDILOL 6.25 MG: 12.5 TABLET, FILM COATED ORAL at 09:15

## 2019-10-22 RX ADMIN — INSULIN LISPRO 3 UNITS: 100 INJECTION, SOLUTION INTRAVENOUS; SUBCUTANEOUS at 12:10

## 2019-10-22 RX ADMIN — Medication 10 ML: at 14:37

## 2019-10-22 RX ADMIN — BUDESONIDE 500 MCG: 0.5 INHALANT RESPIRATORY (INHALATION) at 19:09

## 2019-10-22 RX ADMIN — INSULIN LISPRO 2 UNITS: 100 INJECTION, SOLUTION INTRAVENOUS; SUBCUTANEOUS at 09:16

## 2019-10-22 NOTE — PROGRESS NOTES
Hospitalist Progress Note    NAME:  Johanny Edmonds   :   1950   MRN:   [id number]     Date/Time:  10/21/2019  Subjective:   Chief Complaint:  Pt has less sob and c/o right chest pain reproducible. Review of Systems:  Y  N       Y  N  []   [x]    Fever/chills                                               [x]   []    Chest Pain  []   [x]    Cough                                                       []   [x]    Diarrhea   []   [x]    Sputum                                                     []   [x]    Constipation  []   [x]    SOB/PIERSON                                                []   [x]    Nausea/Vomit  []   [x]    Abd Pain                                                    []   []    Tolerating PT  []   [x]    Dysuria                                                      []   []    Tolerating Diet     []  Unable to obtain  ROS due to  []  mental status change  []  sedated   []  intubated     Past Med History and Social history reviewed. No changes. [x]  Current Medication list and allergies reviewed*    Objective:   Vitals:  Visit Vitals  BP (!) 131/106   Pulse 100   Temp 98.6 °F (37 °C)   Resp 20   Ht 5' 4\" (1.626 m)   Wt 103.4 kg (228 lb)   SpO2 98%   Breastfeeding?  No   BMI 39.14 kg/m²     Temp (24hrs), Av.4 °F (36.9 °C), Min:97.9 °F (36.6 °C), Max:98.6 °F (37 °C)      Last 24hr Input/Output:    Intake/Output Summary (Last 24 hours) at 10/21/2019 2010  Last data filed at 10/21/2019 1859  Gross per 24 hour   Intake 930 ml   Output 875 ml   Net 55 ml        PHYSICAL EXAM:  Gen:  []  WD [x]  WN  []  cachectic  []  thin  [x]  obese  []  disheveled             []   Critically ill or  []  Chronically ill appearing    HEENT:   []   red/pink conjunctivae [x]  pale conjunctivae                  PERRL  []  yes  []  no        hearing intact to voice []  yes  []  No               [x]  moist or  []  dry  Mucosa  NECK:   supple [x]  yes  []  no      masses []  yes  []  No               thyroid    []  non tender []  tender    RESP:   use of accessory muscles []  yes [x]  no                BS    [x]  clear  []  wheezing []  rhonchi []  crackles   CARD:  murmur  []  yes []  no   []  thrill  []  carotid bruits                 LE edema []  yes  []  no    []  JVD present    ABD:    tenderness []  yes [x]  no   Liver/spleen enlargement []   yes []   no                distended []   yes [x]  no    bowel sound []  normal []  hypo or  []  hyperactive    MSKL:   cyanosis/clubbing []  yes [x]  no         []   Spastic []  Flaccid []  Cog wheeling    SKIN:   Rashes []  yes [x]  no     Ulcers []  present ___               []  normal []  tight to palpitation        skin turgor []  good []  poor []  decreased    NEUR:   [x]  cranial nerves intact       []  L []  R weakness    []   follows commands     []   aphasic    []  alert  []  lethargic  []  stuporous  []  sedated             Alert and Oriented  []  time  []  place   []  person  PSYCH:   insight []  poor []  good     mood []  depressed []  anxious []  agitated                   []  Telemetry Reviewed     []  NSR []  PAC/PVCs   []  Afib  []  Paced   []  NSVT   []  Frye []  NGT  []  Intubated on vent    Lab Data Reviewed:  No components found for: [place]  Recent Labs     10/21/19  0243 10/20/19  0423 10/19/19  2040 10/19/19  0125    142  --  142   K 4.4 4.5  --  4.3    111  --  111   CO2 24 21  --  18*   BUN 46* 30*  --  20*   CREA 2.03* 1.61*  --  1.53*   * 179*  --  151*   PHOS 3.9 4.3  --  3.7   CA 8.6 8.5 8.7 8.3*   WBC 10.2 9.2  --  9.5   HGB 8.5* 8.8*  --  10.5*   HCT 28.4* 29.1*  --  35.0    332  --  308        []    I have personally reviewed the   []  xray  []  CT scan    Assessment/Plan:     Active Problems:    COPD exacerbation ([facility] [address] (8/25/2019)      Syncope (10/18/2019)      Flash pulmonary edema ([facility] [address] (10/18/2019)      Elevated d-dimer (10/18/2019)      Acute on chronic respiratory failure with hypoxia ([facility] (10/18/2019)      Pulmonary edema cardiac cause ([facility] (10/19/2019)      COPD with acute exacerbation ([facility] (10/19/2019)      Respiratory failure requiring intubation ([facility] (10/19/2019)     PNA___________________________________________________        ___________________________________________________    Total time spent with patient:     minutes    []  Critical Care time:      minutes    Care Plan discussed with:    [x]  Patient   [x]  Family    []  Care Manager  []  Nursing   []  Consultant/Specialist :      []    >50% of visit spent in counseling and coordination of care   (Discussed []  CODE status,  []  Care Plan, []  D/C Planning)    Prophylaxis:  []  Lovenox  []  Coumadin  [x]  Hep SQ  []  SCDs  []  H2B/PPI    Disposition:  []  Home w/ Family   []  HH PT,OT,RN   [x]  SNF/LTC   []  SAH/Rehab  ___________________________________________________    Hospitalist: Blaise Alcala MD     ___________________________________________________    *Medications reviewed:  Current Facility-Administered Medications   Medication Dose Route Frequency    melatonin tablet 10 mg  10 mg Oral QHS    lidocaine 4 % patch 1 Patch  1 Patch TransDERmal Q24H    diclofenac (VOLTAREN) 1 % topical gel 2 g  2 g Topical QID    azithromycin (ZITHROMAX) tablet 500 mg  500 mg Oral Q24H    tiZANidine (ZANAFLEX) tablet 2 mg  2 mg Oral QHS PRN    influenza vaccine 2019-20 (6 mos+)(PF) (FLUARIX/FLULAVAL/FLUZONE QUAD) injection 0.5 mL  0.5 mL IntraMUSCular PRIOR TO DISCHARGE    aspirin chewable tablet 81 mg  81 mg Oral DAILY    predniSONE (DELTASONE) tablet 40 mg  40 mg Oral DAILY WITH BREAKFAST    carvedilol (COREG) tablet 6.25 mg  6.25 mg Oral BID WITH MEALS    insulin lispro (HUMALOG) injection   SubCUTAneous AC&HS    heparin (porcine) injection 5,000 Units  5,000 Units SubCUTAneous Q8H    sodium chloride (NS) flush 5-40 mL  5-40 mL IntraVENous Q8H    sodium chloride (NS) flush 5-40 mL  5-40 mL IntraVENous PRN    acetaminophen (TYLENOL) tablet 650 mg  650 mg Oral Q6H PRN    ondansetron (ZOFRAN) injection 4 mg  4 mg IntraVENous Q6H PRN    glucose chewable tablet 16 g  4 Tab Oral PRN    glucagon (GLUCAGEN) injection 1 mg  1 mg IntraMUSCular PRN    dextrose (D50W) injection syrg 12.5-25 g  25-50 mL IntraVENous PRN    budesonide (PULMICORT) 500 mcg/2 ml nebulizer suspension  500 mcg Nebulization BID RT    albuterol-ipratropium (DUO-NEB) 2.5 MG-0.5 MG/3 ML  3 mL Nebulization Q4H RT    multivit-folic acid-herbal 696 (WELLESSE PLUS) oral liquid 30 mL  30 mL Per NG tube DAILY    cefTRIAXone (ROCEPHIN) 2 g in sterile water (preservative free) 20 mL IV syringe  2 g IntraVENous Q24H

## 2019-10-22 NOTE — DIABETES MGMT
GLYCEMIC CONTROL AND NUTRITION    Assessment/Recommendations:  Fasting lab glucose this am 130 mg/dl  Continue corrective insulin coverage as needed  Will continue inpatient monitoring. Most recent blood glucose values:  Results for Sue Alaniz (MRN [id number]) as of 10/22/2019 11:18   Ref. Range 10/21/2019 15:56 10/21/2019 23:27 10/22/2019 03:30 10/22/2019 06:55 10/22/2019 07:56   GLUCOSE,FAST - POC Latest Ref Range: 70 - 110 mg/dL 181 (H) 196 (H)  151 (H) 159 (H)     Current A1C of 6.6 % is equivalent to average blood glucose of 143 mg/dl over the past 2-3 months. Current hospital diabetes medications:   Lispro corrective insulin coverage AC&HS  Previous day's insulin requirements:   Lispro 6 units corrective insulin  Home diabetes medications:  Verified with pt. Levemir 15 units daily  Humalog 2 units 3 times daily with meals  Diet:    Cardiac soft solids.  Consistent carb  Education:  ____Refer to Diabetes Education Record             __x_Education not indicated at this time      Harley Richard, [facility] CDE  Ext [phone]

## 2019-10-22 NOTE — PROGRESS NOTES
[facility]      CARDIOLOGY PROGRESS NOTE  RECS:  1. Respiratory Failure s/p Mechanical Ventilation - now extubated   2. Syncope CT Head: No acute findings.   3. Coronary artery disease Neg ACS  · 8/28/19 NST: Left ventricular function post-stress was abnormal. Wall motion was abnormal at stress as described below in the wall scoring diagram. Calculated ejection fraction is 44%. The TID ratio is 0.94. Left ventricular perfusion is abnormal.   Perfusion defect 1: There is a left ventricular perfusion defect that is moderate in size with a severe reduction in uptake affecting the mid-apical anterior and apex location(s) that is non-reversible. There is abnormal wall motion in the defect area. Viability in the area is poor. The defect appears to be infarction. Perfusion defect was visually present without quantitative evidence. · s/p YADIRA to proximal LAD with RESOLUTE stent 5/20/2018.  Acute stent thrombosis shortly after initial PCI on 5/20/2018 requiring emergency intubation for pulmonary edema and repeat cardiac cath and additional PCI/YADIRA to prox LAD.   4.  Chronic Heart failure with mild systolic dysfunction exacerbation  -improved very well clinically.   Repeat echo with EF 46%. Episode  of acute dyspnea overnight. Received lasix with good urine  output, SOB resolved. Per nephrology - may use Lasix PRN. 5.   Hx of Ischemic cardiomyopathy- with EF 35% on echo  5/21/2018  with elevated pulmonary pressures at 44mm Hg- now  with mild systolic Dysfunction Recent Echo 8/7/19 showed  EF% 45%-50%. 6.  Hypertension- stable  on Entresto and bb   7. DM Type II- medications per medical team   8. Dyslipidemia- on Lipitor 20 mg. LDL 80 on 8/19   9. COPD exacerbation- managed by medical team   10. Hx of Tobacco abuse with   1 ppd X 50 years- patient quit in 2018  11. Obesity- weight loss advised  12. TESSA- uses CPAP at night   13.  Urinary tract infection       10/19/19 Echocardiogram:    · Left Ventricle: Mildly dilated left ventricle. Mild concentric hypertrophy. Mild-to-moderate systolic dysfunction. Calculated left ventricular ejection fraction is 46%. Biplane method used to measure ejection fraction. · Mitral Valve: Mitral valve thickening. Mitral annular calcification. Moderate mitral valve regurgitation is present. The mitral regurgitant jet is eccentric. · Interatrial Septum: Agitated saline contrast study was performed. No obvious septal defect. ASSESSMENT:  Hospital Problems  Date Reviewed: 10/11/2019          Codes Class Noted POA    Pulmonary edema cardiac cause ([facility] ICD-10-CM: I50.1  ICD-9-CM: 149  10/19/2019 Unknown        COPD with acute exacerbation ([facility] ICD-10-CM: J44.1  ICD-9-CM: 491.21  10/19/2019 Unknown        Respiratory failure requiring intubation ([facility] ICD-10-CM: J96.90  ICD-9-CM: 518.81  10/19/2019 Unknown        Syncope ICD-10-CM: R55  ICD-9-CM: 780.2  10/18/2019 Unknown        Flash pulmonary edema ([facility] ICD-10-CM: J81.0  ICD-9-CM: 518.4  10/18/2019 Unknown        Elevated d-dimer ICD-10-CM: R79.89  ICD-9-CM: 790.92  10/18/2019 Unknown        * (Principal) Acute on chronic respiratory failure with hypoxia (HCC) ICD-10-CM: J96.21  ICD-9-CM: 518.84, 799.02  10/18/2019 Unknown        COPD exacerbation ([facility] ICD-10-CM: J44.1  ICD-9-CM: 491.21  8/25/2019 Unknown                SUBJECTIVE:  SOB improved  C/O chest pain - worse with coughing. OBJECTIVE:    VS:   Visit Vitals  /64   Pulse 88   Temp 97.9 °F (36.6 °C)   Resp 22   Ht 5' 4\" (1.626 m)   Wt 103.4 kg (228 lb)   SpO2 97%   Breastfeeding? No   BMI 39.14 kg/m²         Intake/Output Summary (Last 24 hours) at 10/22/2019 1010  Last data filed at 10/22/2019 0924  Gross per 24 hour   Intake 700 ml   Output 3725 ml   Net -3025 ml     TELE: normal sinus rhythm    General: alert and in no apparent distress  HENT: Normocephalic, atraumatic. Normal external eye.   Neck :  increased JVP  Cardiac:  regular rate and rhythm, S1, S2 normal, no murmur, click, rub or gallop  Lungs: diffuse rhonchi bilaterally  Abdomen: Soft, nontender, no masses  Extremities:  No c/c/e, peripheral pulses present      Labs: Results:       Chemistry Recent Labs     10/22/19  0330 10/21/19  0243 10/20/19  0423   * 139* 179*    141 142   K 4.1 4.4 4.5    111 111   CO2 26 24 21   BUN 49* 46* 30*   CREA 1.76* 2.03* 1.61*   CA 9.1 8.6 8.5   AGAP 7 6 10   BUCR 28* 23* 19      CBC w/Diff Recent Labs     10/22/19  0330 10/21/19  0243 10/20/19  0423   WBC 9.2 10.2 9.2   RBC 3.86* 3.45* 3.54*   HGB 9.4* 8.5* 8.8*   HCT 31.7* 28.4* 29.1*    333 332   GRANS 78* 78* 89*   LYMPH 14* 14* 7*   EOS 0 0 0      Cardiac Enzymes Recent Labs     10/20/19  1835 10/20/19  1230    120   CKND1 CALCULATION NOT PERFORMED WHEN RESULT IS BELOW LINEAR LIMIT CALCULATION NOT PERFORMED WHEN RESULT IS BELOW LINEAR LIMIT      Coagulation No results for input(s): PTP, INR, APTT, INREXT in the last 72 hours. Lipid Panel Lab Results   Component Value Date/Time    Cholesterol, total 152 10/10/2019 12:00 AM    HDL Cholesterol 46 10/10/2019 12:00 AM    LDL, calculated 86 10/10/2019 12:00 AM    VLDL, calculated 20 10/10/2019 12:00 AM    Triglyceride 101 10/10/2019 12:00 AM    CHOL/HDL Ratio 5.6 (H) 05/21/2018 06:23 AM      BNP No results for input(s): BNPP in the last 72 hours. Liver Enzymes No results for input(s): TP, ALB, TBIL, AP, SGOT, GPT in the last 72 hours. No lab exists for component: DBIL   Thyroid Studies Lab Results   Component Value Date/Time    TSH 0.47 10/19/2019 08:40 PM              Kendra Estrada NP   Supervised    I have independently evaluated and examined the patient. All relevant labs and testing data's are reviewed. Care plan discussed and updated after review.     Sharon Hernandez MD

## 2019-10-22 NOTE — PROGRESS NOTES
Problem: Mobility Impaired (Adult and Pediatric)  Goal: *Acute Goals and Plan of Care (Insert Text)  Description  Physical Therapy Goals  Initiated 10/21/2019 and to be accomplished within 7 day(s)  1. Patient will move from supine to sit and sit to supine  in bed with modified independence. 2.  Patient will transfer from bed to chair and chair to bed with supervision/set-up using the least restrictive device. 3.  Patient will perform sit to stand with supervision/set-up. 4.  Patient will ambulate with supervision/set-up for 100 feet with the least restrictive device. 5.  Patient will ascend/descend 3 stairs with 1 handrail(s) with minimal assistance/contact guard assist.    PLOF: pt reports ambulating with a cane primarily but also has a rollator   Outcome: Progressing Towards Goal   PHYSICAL THERAPY TREATMENT    Patient: Anderson Orosco (22 y.o. female)  Date: 10/22/2019  Diagnosis: Syncope [R55]  COPD exacerbation ([address] [J44.1]  Acute on chronic respiratory failure with hypoxia (HCC) [J96.21]  Flash pulmonary edema (HCC) [J81.0]  Elevated d-dimer [R79.89]  COPD with acute exacerbation (HCC) [J44.1]  Respiratory failure requiring intubation ([address] [J96.90]  Pulmonary edema cardiac cause (HCC) [I50.1]   Precautions: Skin   Chart, physical therapy assessment, plan of care and goals were reviewed. OBJECTIVE/ ASSESSMENT:  Patient found supine in bed willing to work with PT. Pt on trial of room air at this time. Pt's SpO2 fluctuated between 88% and 93% throughout tx with and without activity. Pt stood and ambulated into hallway this tx without AD. Pt requires mostly CG, but sometimes needs min A due to path deviation / unsteady gait. Pt returned to b/s chair where she was left with needs in reach. Progression toward goals:  ?      Improving appropriately and progressing toward goals  ? Improving slowly and progressing toward goals  ?       Not making progress toward goals and plan of care will be adjusted     PLAN:  Patient continues to benefit from skilled intervention to address the above impairments. Continue treatment per established plan of care. Discharge Recommendations:  Acute Rehab  Further Equipment Recommendations for Discharge:  SPC / TBD     SUBJECTIVE:   Patient stated I have a walker, but I don't usually use it.     OBJECTIVE DATA SUMMARY:   Critical Behavior:  Neurologic State: Alert, Eyes open spontaneously  Orientation Level: Oriented X4  Cognition: Follows commands  Safety/Judgement: Fall prevention  Functional Mobility Training:  Bed Mobility:  Rolling: Stand-by assistance  Supine to Sit: Stand-by assistance  Transfers:  Sit to Stand: Stand-by assistance  Stand to Sit: Stand-by assistance  Balance:  Sitting: Intact  Standing: Impaired; With support  Standing - Static: Good  Standing - Dynamic : Fair  Ambulation/Gait Training:  Distance (ft): 50 Feet (ft)  Assistive Device: (None)  Gait Abnormalities: Decreased step clearance; Path deviations  Base of Support: Narrowed  Speed/Venus: Slow  Step Length: Left shortened;Right shortened      Pain:  Pain level pre-treatment: Not rated  Pain level post-treatment: Not rated    Activity Tolerance:   Fair  Please refer to the flowsheet for vital signs taken during this treatment. After treatment:   ? Patient left in no apparent distress sitting up in chair  ? Patient left in no apparent distress in bed  ? Call bell left within reach  ? Nursing notified  ? Caregiver present  ? Bed alarm activated  ? SCDs applied    COMMUNICATION/EDUCATION:   ?         Role of Physical Therapy  ? Fall prevention  ? Bed mobility  ? Transfers  ? Ambulation / gait  ? Assistive device management  ? Stairs  ? Body mechanics  ? Position change   ? Therapeutic exercise  ? Activity pacing / energy conservation  ?          Other:      Dorota Newsome PTA   Time Calculation: 26 mins

## 2019-10-22 NOTE — PROGRESS NOTES
PCCM Update:    Called to patient room as patient in acute resp distress. Patient w/ rales, wheezing b/l. 1 word dyspnea. Sats were high 80's to low 90's initially. CXR ordered, 40 mg lasix administered and neb treatment given. Bipap applied. Patient stabilized, sats % and RR normalized. Appearing a lot more comfortable. UOP at this point 200 mL per purewick. Discussed w/ Dr. Chacha Vargas and Dr. Roselia Iyer. Patient appearing stable for stepdown status at this time with bipap. Will still follow closely throughout the night.      Susana De La Cruz PA-C  10/21/19  Pulmonary, Critical Care Medicine  [facility]

## 2019-10-22 NOTE — ROUTINE PROCESS
TRANSFER - OUT REPORT:    Verbal report given to [facility] RN(name) on Haleigh Pulido  being transferred to 2 S(unit) for routine progression of care     Report consisted of patients Situation, Background, Assessment and   Recommendations(SBAR). Information from the following report(s) SBAR, ED Summary, Procedure Summary, Intake/Output, MAR, Recent Results and Cardiac Rhythm SR-ST was reviewed with the receiving nurse. Opportunity for questions and clarification was provided.       Patient transported with: Monitor  O2 @ 3 liters  Registered Nurse

## 2019-10-22 NOTE — ROUTINE PROCESS
Bedside and Verbal shift change report given to Nurse Benitez Marin RN (oncoming nurse) by William Jaquez RN   (offgoing nurse). Report included the following information SBAR, ED Summary, Procedure Summary, Intake/Output, MAR and Recent Results.

## 2019-10-22 NOTE — PROGRESS NOTES
SUBJECTIVE:    Patient is feeling fine. She stated she had to use BiPAP last night. She was taken off oxygen and will be monitored. Denies any headache or dizziness currently but she stated she came with the dizziness while standing up and had a fall at home. Shortness of breath patient. Dry cough. Off-and-on chest discomfort. No nausea or vomiting. No abdominal pain. OBJECTIVE:    /78   Pulse 84   Temp 98.5 °F (36.9 °C)   Resp 22   Ht 5' 4\" (1.626 m)   Wt 103.4 kg (228 lb)   SpO2 96%   Breastfeeding? No   BMI 39.14 kg/m²     General appearance - alert, well appearing, and in no distress  Eyes - sclera anicteric, no pallor  Nose - no nasal discharge. No sinus tenderness over maxillary sinuses  Neck - supple, no JVD, trachea is midline  Chest - decreased air entry noted in bases, no wheezes  Heart - S1 and S2 normal  Abdomen - soft, nontender, nondistended, Bowel sounds present  Neurological - alert, oriented, normal speech, limited range of motion of both lower extremity while in bed. Moves both upper extremity very well. Musculoskeletal - no joint tenderness or swelling of knees   Extremities - no pedal edema noted    ASSESSMENT:    1. Acute hypoxic respiratory failure due to acute combined CHF exacerbation, pneumonia and less likely COPD status post extubation  2. Non-cardiac reproducible musculoskeletal chest pain most likely costochondritis  3. Acute metabolic encephalopathy due to ICU delirium, now resolved  4. Reportedly syncope at the time of admission, no recurrence  5. Acute combined systolic and diastolic heart failure with EF of 46 percentage, clinically much better. 6.  Hypertension  7. Diabetes mellitus  8. Dyslipidemia  9. COPD without any exacerbation  10. Community-acquired pneumonia  11. Obstructive sleep apnea on home CPAP  12. Obesity  13.   Acute renal failure, improving    PLAN:    Continue current management  Blood cultures from October 18, 2019's are negative. Influenza A and B negative. Bilateral carotid artery Doppler report reviewed  Serial chest x-rays report reviewed the last cyst x-ray from yesterday shows slightly progressive reticular nodular airspace opacities. Echocardiogram report reviewed  CTA chest from October 18, 2019 the report reviewed  Nephrology to follow. Renal function is improving  Continue antibiotic and will go down on steroid tomorrow. PT and OT to see this patient  Monitor this patient off oxygen and document it.     BMP:   Lab Results   Component Value Date/Time     10/22/2019 03:30 AM    K 4.1 10/22/2019 03:30 AM     10/22/2019 03:30 AM    CO2 26 10/22/2019 03:30 AM    AGAP 7 10/22/2019 03:30 AM     (H) 10/22/2019 03:30 AM    BUN 49 (H) 10/22/2019 03:30 AM    CREA 1.76 (H) 10/22/2019 03:30 AM    GFRAA 35 (L) 10/22/2019 03:30 AM    GFRNA 29 (L) 10/22/2019 03:30 AM     CBC:   Lab Results   Component Value Date/Time    WBC 9.2 10/22/2019 03:30 AM    HGB 9.4 (L) 10/22/2019 03:30 AM    HCT 31.7 (L) 10/22/2019 03:30 AM     10/22/2019 03:30 AM

## 2019-10-22 NOTE — PROGRESS NOTES
Progress Note    76y F with PMH HTN, CKD, COPD, heart failure, admitted for respiratory failure, seen for renal insufficieny  Subjective      She had episode of acute dyspnea , received lasix , excellent urine output. Her breathing is much better today. Still having mild chest discomfort. IMPRESSION:   CARA,   CKD 3, baseline creatinine arorund 1.4  Heavy proteinuria  Respiratory failure, acute, required intubation on this admission, now extubated  Heart failure  COPD  Anemia   Secondary hyperparathyroid of CKD   PLAN:   Okay with prn lasix. Caution with nsaids use, monitor renal function . Adjust meds per current renal function status.     Discussed with Dr. Caballero            Current Facility-Administered Medications   Medication Dose Route Frequency    melatonin tablet 10 mg  10 mg Oral QHS    lidocaine 4 % patch 1 Patch  1 Patch TransDERmal Q24H    diclofenac (VOLTAREN) 1 % topical gel 2 g  2 g Topical QID    azithromycin (ZITHROMAX) tablet 500 mg  500 mg Oral Q24H    tiZANidine (ZANAFLEX) tablet 2 mg  2 mg Oral QHS PRN    influenza vaccine 2019-20 (6 mos+)(PF) (FLUARIX/FLULAVAL/FLUZONE QUAD) injection 0.5 mL  0.5 mL IntraMUSCular PRIOR TO DISCHARGE    aspirin chewable tablet 81 mg  81 mg Oral DAILY    predniSONE (DELTASONE) tablet 40 mg  40 mg Oral DAILY WITH BREAKFAST    carvedilol (COREG) tablet 6.25 mg  6.25 mg Oral BID WITH MEALS    insulin lispro (HUMALOG) injection   SubCUTAneous AC&HS    heparin (porcine) injection 5,000 Units  5,000 Units SubCUTAneous Q8H    sodium chloride (NS) flush 5-40 mL  5-40 mL IntraVENous Q8H    sodium chloride (NS) flush 5-40 mL  5-40 mL IntraVENous PRN    acetaminophen (TYLENOL) tablet 650 mg  650 mg Oral Q6H PRN    ondansetron (ZOFRAN) injection 4 mg  4 mg IntraVENous Q6H PRN    glucose chewable tablet 16 g  4 Tab Oral PRN    glucagon (GLUCAGEN) injection 1 mg  1 mg IntraMUSCular PRN    dextrose (D50W) injection syrg 12.5-25 g  25-50 mL IntraVENous PRN    budesonide (PULMICORT) 500 mcg/2 ml nebulizer suspension  500 mcg Nebulization BID RT    albuterol-ipratropium (DUO-NEB) 2.5 MG-0.5 MG/3 ML  3 mL Nebulization Q4H RT    multivit-folic acid-herbal 925 (WELLESSE PLUS) oral liquid 30 mL  30 mL Per NG tube DAILY    cefTRIAXone (ROCEPHIN) 2 g in sterile water (preservative free) 20 mL IV syringe  2 g IntraVENous Q24H       Review of Systems:     Not able to provide  Data Review:    Labs: Results:       Chemistry Recent Labs     10/22/19  0330 10/21/19  0243 10/20/19  0423   * 139* 179*    141 142   K 4.1 4.4 4.5    111 111   CO2 26 24 21   BUN 49* 46* 30*   CREA 1.76* 2.03* 1.61*   CA 9.1 8.6 8.5   AGAP 7 6 10   BUCR 28* 23* 19      CBC w/Diff Recent Labs     10/22/19  0330 10/21/19  0243 10/20/19  0423   WBC 9.2 10.2 9.2   RBC 3.86* 3.45* 3.54*   HGB 9.4* 8.5* 8.8*   HCT 31.7* 28.4* 29.1*    333 332   GRANS 78* 78* 89*   LYMPH 14* 14* 7*   EOS 0 0 0      Coagulation No results for input(s): PTP, INR, APTT, INREXT, INREXT in the last 72 hours. Iron/Ferritin Recent Labs     10/19/19  2040   IRON 19*      BNP No results for input(s): BNPP in the last 72 hours. Cardiac Enzymes Recent Labs     10/20/19  1835 10/20/19  1230    120   CKND1 CALCULATION NOT PERFORMED WHEN RESULT IS BELOW LINEAR LIMIT CALCULATION NOT PERFORMED WHEN RESULT IS BELOW LINEAR LIMIT      Liver Enzymes No results for input(s): TP, ALB, TBIL, AP, SGOT, GPT in the last 72 hours. No lab exists for component: DBIL   Thyroid Studies Lab Results   Component Value Date/Time    TSH 0.47 10/19/2019 08:40 PM         EKG: sinus     Physical Assessment:     Visit Vitals  /64   Pulse 88   Temp 97.9 °F (36.6 °C)   Resp 22   Ht 5' 4\" (1.626 m)   Wt 103.4 kg (228 lb)   SpO2 97%   Breastfeeding?  No   BMI 39.14 kg/m²     Weight change:     Intake/Output Summary (Last 24 hours) at 10/22/2019 1017  Last data filed at 10/22/2019 0924  Gross per 24 hour Intake 700 ml   Output 3725 ml   Net -3025 ml     Physical Exam:   General: no acute respiratory distress   HEENT  supple neck, no thyromegaly  CVS: S1S2 heard,  no rub  RS: + air entry b/l,   Abd: Soft, Non tender,   Neuro: intubated, sedated  Extrm: + edema, no cyanosis, clubbing   Skin: no visible  Rash  Musculoskeletal: No gross joints or bone deformities     Procedures/imaging: see electronic medical records for all procedures, Xrays and details which were not copied into this note but were reviewed prior to creation of Erika Cintron MD  October 22, 2019  [facility]  Office [phone]

## 2019-10-22 NOTE — PROGRESS NOTES
Pt on side of bed crying out for help stating she needed to urinate. Pt attempting to get OOB anxious. Caitlyn Us RN at bedside. Pt audibly in respiratory distress speaking in incomplete sentences. SPO2 low 80's Pt placed on NRB mask. Sarah Madden PA at bedside. RT paged. Pt lungs + wheezes and crackles. Pt given DUONEB x 1 and 80 mg Lasix IV via verbal or PA. RT placed pt on BIPAP. SPO2 100 % Pt calm, stating she is feeling better. Pt speaking in complete sentences.  Call bell within reach will continue to monitor

## 2019-10-22 NOTE — PROGRESS NOTES
Spoke with on call PA for ICU  Patient had bout of respiratory distress and anxiety   She is treating for Pulmonary edema and doing BIPAP  Patient accepted by hospital team earlier today  Will continue to follow asked PA to determine dispositon with attending   ?  Step down pending response to treatment   We will continue to follow on hospitalist team as previously accepted

## 2019-10-22 NOTE — ROUTINE PROCESS
Patient AOX4, on NC at 3 lpm, diminished clear breath sounds, stepdown status, c/o of chest pain more so of muscle spasm, gave zanaflex that pt claims help her (did not have it last night) and she takes it BID, will address to primary MD, otherwise currently pt stable. 1445 patient on RA for the last hour saturating between 90-94% no distress noted      1530 PT ambulated pt around hallway then to recliner on RA noted sats between 88%-94%.

## 2019-10-23 LAB
ANION GAP SERPL CALC-SCNC: 5 MMOL/L (ref 3–18)
ARTERIAL PATENCY WRIST A: YES
BASE EXCESS BLD CALC-SCNC: 2 MMOL/L
BASOPHILS # BLD: 0 K/UL (ref 0–0.1)
BASOPHILS NFR BLD: 0 % (ref 0–2)
BDY SITE: ABNORMAL
BODY TEMPERATURE: 98.6
BUN SERPL-MCNC: 50 MG/DL (ref 7–18)
BUN/CREAT SERPL: 32 (ref 12–20)
CALCIUM SERPL-MCNC: 8.9 MG/DL (ref 8.5–10.1)
CHLORIDE SERPL-SCNC: 106 MMOL/L (ref 100–111)
CO2 SERPL-SCNC: 27 MMOL/L (ref 21–32)
CREAT SERPL-MCNC: 1.57 MG/DL (ref 0.6–1.3)
DIFFERENTIAL METHOD BLD: ABNORMAL
EOSINOPHIL # BLD: 0 K/UL (ref 0–0.4)
EOSINOPHIL NFR BLD: 0 % (ref 0–5)
ERYTHROCYTE [DISTWIDTH] IN BLOOD BY AUTOMATED COUNT: 18.1 % (ref 11.6–14.5)
GAS FLOW.O2 O2 DELIVERY SYS: ABNORMAL L/MIN
GLUCOSE BLD STRIP.AUTO-MCNC: 114 MG/DL (ref 70–110)
GLUCOSE BLD STRIP.AUTO-MCNC: 148 MG/DL (ref 70–110)
GLUCOSE BLD STRIP.AUTO-MCNC: 170 MG/DL (ref 70–110)
GLUCOSE BLD STRIP.AUTO-MCNC: 207 MG/DL (ref 70–110)
GLUCOSE SERPL-MCNC: 107 MG/DL (ref 74–99)
HCO3 BLD-SCNC: 26.4 MMOL/L (ref 22–26)
HCT VFR BLD AUTO: 30.1 % (ref 35–45)
HGB BLD-MCNC: 9 G/DL (ref 12–16)
LYMPHOCYTES # BLD: 1.9 K/UL (ref 0.9–3.6)
LYMPHOCYTES NFR BLD: 24 % (ref 21–52)
MAGNESIUM SERPL-MCNC: 2.1 MG/DL (ref 1.6–2.6)
MCH RBC QN AUTO: 24.2 PG (ref 24–34)
MCHC RBC AUTO-ENTMCNC: 29.9 G/DL (ref 31–37)
MCV RBC AUTO: 80.9 FL (ref 74–97)
MONOCYTES # BLD: 0.9 K/UL (ref 0.05–1.2)
MONOCYTES NFR BLD: 11 % (ref 3–10)
NEUTS SEG # BLD: 5 K/UL (ref 1.8–8)
NEUTS SEG NFR BLD: 65 % (ref 40–73)
O2/TOTAL GAS SETTING VFR VENT: 21 %
PCO2 BLD: 42 MMHG (ref 35–45)
PH BLD: 7.41 [PH] (ref 7.35–7.45)
PLATELET # BLD AUTO: 385 K/UL (ref 135–420)
PMV BLD AUTO: 9.5 FL (ref 9.2–11.8)
PO2 BLD: 56 MMHG (ref 80–100)
POTASSIUM SERPL-SCNC: 4.1 MMOL/L (ref 3.5–5.5)
RBC # BLD AUTO: 3.72 M/UL (ref 4.2–5.3)
SAO2 % BLD: 89 % (ref 92–97)
SERVICE CMNT-IMP: ABNORMAL
SODIUM SERPL-SCNC: 138 MMOL/L (ref 136–145)
SPECIMEN TYPE: ABNORMAL
TOTAL RESP. RATE, ITRR: 20
WBC # BLD AUTO: 7.8 K/UL (ref 4.6–13.2)

## 2019-10-23 PROCEDURE — 36600 WITHDRAWAL OF ARTERIAL BLOOD: CPT

## 2019-10-23 PROCEDURE — 82962 GLUCOSE BLOOD TEST: CPT

## 2019-10-23 PROCEDURE — 82803 BLOOD GASES ANY COMBINATION: CPT

## 2019-10-23 PROCEDURE — 92526 ORAL FUNCTION THERAPY: CPT

## 2019-10-23 PROCEDURE — 74011636637 HC RX REV CODE- 636/637: Performed by: INTERNAL MEDICINE

## 2019-10-23 PROCEDURE — 65660000000 HC RM CCU STEPDOWN

## 2019-10-23 PROCEDURE — 74011000250 HC RX REV CODE- 250: Performed by: INTERNAL MEDICINE

## 2019-10-23 PROCEDURE — 94640 AIRWAY INHALATION TREATMENT: CPT

## 2019-10-23 PROCEDURE — 85025 COMPLETE CBC W/AUTO DIFF WBC: CPT

## 2019-10-23 PROCEDURE — 80048 BASIC METABOLIC PNL TOTAL CA: CPT

## 2019-10-23 PROCEDURE — 74011250637 HC RX REV CODE- 250/637: Performed by: INTERNAL MEDICINE

## 2019-10-23 PROCEDURE — 74011000250 HC RX REV CODE- 250: Performed by: PHYSICIAN ASSISTANT

## 2019-10-23 PROCEDURE — 36415 COLL VENOUS BLD VENIPUNCTURE: CPT

## 2019-10-23 PROCEDURE — 74011250636 HC RX REV CODE- 250/636: Performed by: INTERNAL MEDICINE

## 2019-10-23 PROCEDURE — 83735 ASSAY OF MAGNESIUM: CPT

## 2019-10-23 PROCEDURE — 74011250637 HC RX REV CODE- 250/637: Performed by: PHYSICIAN ASSISTANT

## 2019-10-23 RX ORDER — PREDNISONE 10 MG/1
10 TABLET ORAL
Status: COMPLETED | OUTPATIENT
Start: 2019-10-24 | End: 2019-10-24

## 2019-10-23 RX ORDER — CHOLECALCIFEROL (VITAMIN D3) 125 MCG
5 CAPSULE ORAL
Status: DISCONTINUED | OUTPATIENT
Start: 2019-10-23 | End: 2019-10-27 | Stop reason: HOSPADM

## 2019-10-23 RX ORDER — SUCRALFATE 1 G/1
1 TABLET ORAL
Status: DISPENSED | OUTPATIENT
Start: 2019-10-23 | End: 2019-10-26

## 2019-10-23 RX ORDER — PANTOPRAZOLE SODIUM 40 MG/1
40 TABLET, DELAYED RELEASE ORAL
Status: DISCONTINUED | OUTPATIENT
Start: 2019-10-23 | End: 2019-10-27 | Stop reason: HOSPADM

## 2019-10-23 RX ADMIN — Medication 10 ML: at 06:00

## 2019-10-23 RX ADMIN — MELATONIN TAB 5 MG 5 MG: 5 TAB at 22:26

## 2019-10-23 RX ADMIN — INSULIN LISPRO 4 UNITS: 100 INJECTION, SOLUTION INTRAVENOUS; SUBCUTANEOUS at 16:30

## 2019-10-23 RX ADMIN — SUCRALFATE 1 G: 1 TABLET ORAL at 16:30

## 2019-10-23 RX ADMIN — INSULIN LISPRO 2 UNITS: 100 INJECTION, SOLUTION INTRAVENOUS; SUBCUTANEOUS at 22:55

## 2019-10-23 RX ADMIN — IPRATROPIUM BROMIDE AND ALBUTEROL SULFATE 3 ML: .5; 3 SOLUTION RESPIRATORY (INHALATION) at 13:47

## 2019-10-23 RX ADMIN — SUCRALFATE 1 G: 1 TABLET ORAL at 22:26

## 2019-10-23 RX ADMIN — BUMETANIDE 1 MG: 1 TABLET ORAL at 08:14

## 2019-10-23 RX ADMIN — ACETAMINOPHEN 650 MG: 325 TABLET ORAL at 08:14

## 2019-10-23 RX ADMIN — PANTOPRAZOLE SODIUM 40 MG: 40 TABLET, DELAYED RELEASE ORAL at 16:30

## 2019-10-23 RX ADMIN — Medication 10 ML: at 13:50

## 2019-10-23 RX ADMIN — IPRATROPIUM BROMIDE AND ALBUTEROL SULFATE 3 ML: .5; 3 SOLUTION RESPIRATORY (INHALATION) at 19:18

## 2019-10-23 RX ADMIN — AZITHROMYCIN 500 MG: 250 TABLET, FILM COATED ORAL at 22:26

## 2019-10-23 RX ADMIN — BUDESONIDE 500 MCG: 0.5 INHALANT RESPIRATORY (INHALATION) at 08:03

## 2019-10-23 RX ADMIN — Medication 30 ML: at 08:15

## 2019-10-23 RX ADMIN — DICLOFENAC 2 G: 10 GEL TOPICAL at 11:33

## 2019-10-23 RX ADMIN — IPRATROPIUM BROMIDE AND ALBUTEROL SULFATE 3 ML: .5; 3 SOLUTION RESPIRATORY (INHALATION) at 08:03

## 2019-10-23 RX ADMIN — WATER 2 G: 1 INJECTION INTRAMUSCULAR; INTRAVENOUS; SUBCUTANEOUS at 22:26

## 2019-10-23 RX ADMIN — Medication 10 ML: at 22:57

## 2019-10-23 RX ADMIN — IPRATROPIUM BROMIDE AND ALBUTEROL SULFATE 3 ML: .5; 3 SOLUTION RESPIRATORY (INHALATION) at 03:44

## 2019-10-23 RX ADMIN — CARVEDILOL 6.25 MG: 12.5 TABLET, FILM COATED ORAL at 08:14

## 2019-10-23 RX ADMIN — PREDNISONE 20 MG: 20 TABLET ORAL at 08:14

## 2019-10-23 RX ADMIN — DICLOFENAC 2 G: 10 GEL TOPICAL at 18:00

## 2019-10-23 RX ADMIN — Medication 81 MG: at 08:14

## 2019-10-23 RX ADMIN — BUDESONIDE 500 MCG: 0.5 INHALANT RESPIRATORY (INHALATION) at 19:18

## 2019-10-23 RX ADMIN — CARVEDILOL 6.25 MG: 12.5 TABLET, FILM COATED ORAL at 16:30

## 2019-10-23 RX ADMIN — DICLOFENAC 2 G: 10 GEL TOPICAL at 16:30

## 2019-10-23 NOTE — PROGRESS NOTES
Bedside shift change report given to Ashtyn (oncoming nurse) by Stephens Bosworth (offgoing nurse). Report included the following information SBAR, Kardex, Intake/Output and MAR.

## 2019-10-23 NOTE — PROGRESS NOTES
[facility]  Pulmonary, Critical Care, and Sleep Medicine    Name: Eduard Gonzalez MRN: [id number]   : 1950 Hospital: [address] [place] [address]   Date: 10/23/2019        Follow-up pulmonary consult note    Consulting physician: [facility]  Reason for consultation: Acute hypoxic respiratory failure, status post extubation      IMPRESSION:   · Acute hypoxic resp failure, likely multifactorial due to acute combined CHF exacerbation, PNA, probably less likely COPD given PFTs in past with restrictive pattern  · Intermittent hypertension and tachycardia  · Acute encephalopathy, etiology likely secondary to ICU delirium -- resolved. · Multifocal patchy opacities, in the setting of pulmonary edema, may represent pneumonia: Improving  · Non-cardiac, reproducible MSK chest pain -- likely costochondritis, cardiac workup unremarkable  · Acute on chronic combined CHF with moderate to severe MR  · CARA on CKD3-4  · Other co-morbids: CAD, DM2, HTN, HLD, obesity, former smoker  · Obesity: Body mass index is 37.77 kg/m².   ·      Patient Active Problem List   Diagnosis Code    Cardiomyopathy, dilated ([facility] I42.0    Environmental allergies Z91.09    Diabetes mellitus type 2, insulin dependent ([facility] E11.9, Z79.4    COPD (chronic obstructive pulmonary disease) ([facility]) J44.9    Essential hypertension I10    Iron deficiency anemia D50.9    Decreased GFR R94.4    Dyslipidemia, goal LDL below 70 E78.5    Former smoker Z87.891    Osteoarthritis of both knees M17.0    Chronic pain of both knees M25.561, M25.562, G89.29    Chronic pain syndrome G89.4    Primary osteoarthritis of both knees M17.0    Chronic obstructive pulmonary disease (HCC) J44.9    Compliance with medication regimen Z91.89    Bilateral shoulder pain M25.511, M25.512    Elevated alkaline phosphatase level R74.8    Caregiver stress Z63.6    STEMI (ST elevation myocardial infarction) ([facility]) I21.3    Acute pulmonary edema ([facility]) J81.0    Coronary artery disease involving native coronary artery of native heart without angina pectoris I25.10    Pulmonary edema J81.1    Chest pain R07.9    SOB (shortness of breath) R06.02    Coronary artery disease involving native coronary artery with unstable angina pectoris (HCC) I25.110    Type 2 diabetes with nephropathy ([facility]) E11.21    Severe obesity (BMI 35.0-39. 9) with comorbidity ([facility] E66.01    COPD exacerbation ([facility] J44.1    CAP (community acquired pneumonia) J18.9    Combined systolic and diastolic congestive heart failure (HCC) I50.40    Type 2 diabetes mellitus with hyperglycemia ([facility]) E11.65    TESSA on CPAP G47.33, Z99.89    Ischemic cardiomyopathy I25.5    Syncope R55    Flash pulmonary edema (HCC) J81.0    Elevated d-dimer R79.89    Acute on chronic respiratory failure with hypoxia ([facility]) J96.21    Pulmonary edema cardiac cause ([facility]) I50.1    COPD with acute exacerbation ([facility]) J44.1    Respiratory failure requiring intubation ([facility]) J96.90        RECOMMENDATIONS:   Maintain net negative fluid balance as renal function tolerates. Diuresis per primary service, Cardiology, Nephrology  Bipap QHS and PRN  Schedule bronchodilators: duonebs q6h, pulmicort nebs 1mg BID, and albuterol PRN. Restart home anoro ellipta and PRN albuterol on discharge  Okay to wean steroids rapidly  ABX per primary service for CAP  Please hold home bronchodilators. May resume on discharge  Supplemental oxygen to maintain SpO2 >88%  Please assess for home oxygen need prior to discharge  Aggressive pulmonary toileting/bronchial hygiene  Frequent incentive spirometry  Aspiration precautions including elevating HOB >30deg  PT/OT, OOB, ambulate with assistance as tolerated  DVT ppx per primary service  Will follow       Subjective/History:   10/23/19  Patient seen and examined at bedside. No acute events overnight.   Today patient reports that she is feeling much better, reports that her leg swelling has decreased, denies nausea, vomiting, diarrhea, chest pain. Patient reports she did have some mild dyspnea, improved with a PRN nebulizer treatment. HPI:  Patient is a 81 y.o. female with PMH Significant for CAD, CMO, DM, HTN, Renal Insufficiency, HLD, COPD, & Former smoker whom presented to [facility] ED this evening with C/o SOB x 1 week. Per notes, she stated that her shortness of breath would cause her to become dizzy & 2 days prior she actually had an syncopal episode. Denies any head trauma. She stated she has been compliant with her COPD inhaler & this would result in intermittent relief of her shortness of breath. She denied any CP. Work-up in ED included CT imaging of head, PCXR, & labs. Labs significant for elevated D-dimer (1.09), Cr 1.49, BNP 1645. Head CT negative. PCXR with No PTX & Volume overloaded appearance with Sm. B/L Pleural effusion's (> R). While in ED, patient used the commode & after developed shortness of breath. Placed on N/c with improvement in 02 sat's & decreased shortness of breath. While patient was receiving Steroids (for AECOPD S/Sx's), patient developed respiratory distress resulting in arrest.  Patient was intubated  & [facility] consulted for ICU Admission & MV Mgmt. In addition, CTA Chest performed to R/o PE.  CTA Confirmed no PE, however B/L Lung consolidation c/w PNA with element of fluid overload. Based on CTA results, Pulmonary edema & pulmonary infiltrates identified by Dr. Judy Amos. D/w [facility] ED Provider & lasix IVP given. Upon arrival to ICU, patient sedated & intubated. Will obtain BC's & Sputum Cx's. Will begin AECOPD regimen, to include ABX, steroids, & Nebs. Will check serial CE's & BNP daily. The patient is critically ill and can not provide additional history due to Ventilated.          Current Facility-Administered Medications   Medication Dose Route Frequency    melatonin tablet 5 mg  5 mg Oral QHS    pantoprazole (PROTONIX) tablet 40 mg  40 mg Oral ACB&D  sucralfate (CARAFATE) tablet 1 g  1 g Oral AC&HS    predniSONE (DELTASONE) tablet 20 mg  20 mg Oral DAILY WITH BREAKFAST    bumetanide (BUMEX) tablet 1 mg  1 mg Oral DAILY    albuterol-ipratropium (DUO-NEB) 2.5 MG-0.5 MG/3 ML  3 mL Nebulization Q6H RT    lidocaine 4 % patch 1 Patch  1 Patch TransDERmal Q24H    diclofenac (VOLTAREN) 1 % topical gel 2 g  2 g Topical QID    azithromycin (ZITHROMAX) tablet 500 mg  500 mg Oral Q24H    influenza vaccine - (6 mos+)(PF) (FLUARIX/FLULAVAL/FLUZONE QUAD) injection 0.5 mL  0.5 mL IntraMUSCular PRIOR TO DISCHARGE    aspirin chewable tablet 81 mg  81 mg Oral DAILY    carvedilol (COREG) tablet 6.25 mg  6.25 mg Oral BID WITH MEALS    insulin lispro (HUMALOG) injection   SubCUTAneous AC&HS    heparin (porcine) injection 5,000 Units  5,000 Units SubCUTAneous Q8H    sodium chloride (NS) flush 5-40 mL  5-40 mL IntraVENous Q8H    budesonide (PULMICORT) 500 mcg/2 ml nebulizer suspension  500 mcg Nebulization BID RT    multivit-folic acid-herbal 676 (WELLESSE PLUS) oral liquid 30 mL  30 mL Per NG tube DAILY    cefTRIAXone (ROCEPHIN) 2 g in sterile water (preservative free) 20 mL IV syringe  2 g IntraVENous Q24H     Review of Systems:  A comprehensive review of systems was negative except for that written in the HPI. Objective:   Vital Signs:    Visit Vitals  /62 (BP 1 Location: Left arm, BP Patient Position: At rest)   Pulse 83   Temp 97.9 °F (36.6 °C)   Resp 18   Ht 5' 4\" (1.626 m)   Wt 99.8 kg (220 lb 0.3 oz)   SpO2 98%   Breastfeeding?  No   BMI 37.77 kg/m²       O2 Device: Room air   O2 Flow Rate (L/min): 0 l/min   Temp (24hrs), Av.4 °F (36.9 °C), Min:97.9 °F (36.6 °C), Max:98.8 °F (37.1 °C)       Intake/Output:   Last shift:      10/701 - 10/23 1900  In: -   Out: 1000 [Urine:1000]  Last 3 shifts: 10/21 1901 - 10/23 0700  In: 940 [P.O.:940]  Out: 4900 [Urine:4900]    Intake/Output Summary (Last 24 hours) at 10/23/2019 1843  Last data filed at 10/23/2019 0800  Gross per 24 hour   Intake --   Output 1000 ml   Net -1000 ml     Physical Exam:     General:  Alert and oriented, comfortable on nasal cannula, sitting upright on side of bed, eating dinner   Head:  Normocephalic, without obvious abnormality, atraumatic. Eyes:  Conjunctivae/corneas clear. PERRL, EOMI, sclera anicteric   Nose: Nares normal. Septum midline. Mucosa normal. No drainage or sinus tenderness. Neck: Supple, symmetrical, trachea midline, no adenopathy, no carotid bruit and no JVD. Lungs:    Unchanged---symmetrical chest rise. CTABl, decreased bases without wheezes/rales/rhonchi   Heart:  RRR, S1, S2 normal, no m/r/g, +reproducible chest pain   Abdomen:   Soft, obese, non-tender. Bowel sounds hypoactive. No masses,  No organomegaly. Extremities: Extremities normal, atraumatic, no cyanosis or clubbing. Trace B/L LE Edema   Pulses: 2+ and symmetric all extremities. Skin: Skin color, texture, turgor normal. No rashes or lesions   Neurologic: Alert and appropriate, oriented x3, grossly nonfocal, strength and coordination grossly intact throughout all extremities, no focal deficits         Data:     Recent Results (from the past 24 hour(s))   GLUCOSE, POC    Collection Time: 10/22/19  9:47 PM   Result Value Ref Range    Glucose (POC) 152 (H) 70 - 110 mg/dL   CBC WITH AUTOMATED DIFF    Collection Time: 10/23/19  2:45 AM   Result Value Ref Range    WBC 7.8 4.6 - 13.2 K/uL    RBC 3.72 (L) 4.20 - 5.30 M/uL    HGB 9.0 (L) 12.0 - 16.0 g/dL    HCT 30.1 (L) 35.0 - 45.0 %    MCV 80.9 74.0 - 97.0 FL    MCH 24.2 24.0 - 34.0 PG    MCHC 29.9 (L) 31.0 - 37.0 g/dL    RDW 18.1 (H) 11.6 - 14.5 %    PLATELET 808 523 - 761 K/uL    MPV 9.5 9.2 - 11.8 FL    NEUTROPHILS 65 40 - 73 %    LYMPHOCYTES 24 21 - 52 %    MONOCYTES 11 (H) 3 - 10 %    EOSINOPHILS 0 0 - 5 %    BASOPHILS 0 0 - 2 %    ABS. NEUTROPHILS 5.0 1.8 - 8.0 K/UL    ABS. LYMPHOCYTES 1.9 0.9 - 3.6 K/UL    ABS.  MONOCYTES 0.9 0.05 - 1.2 K/UL ABS. EOSINOPHILS 0.0 0.0 - 0.4 K/UL    ABS. BASOPHILS 0.0 0.0 - 0.1 K/UL    DF AUTOMATED     METABOLIC PANEL, BASIC    Collection Time: 10/23/19  2:45 AM   Result Value Ref Range    Sodium 138 136 - 145 mmol/L    Potassium 4.1 3.5 - 5.5 mmol/L    Chloride 106 100 - 111 mmol/L    CO2 27 21 - 32 mmol/L    Anion gap 5 3.0 - 18 mmol/L    Glucose 107 (H) 74 - 99 mg/dL    BUN 50 (H) 7.0 - 18 MG/DL    Creatinine 1.57 (H) 0.6 - 1.3 MG/DL    BUN/Creatinine ratio 32 (H) 12 - 20      GFR est AA 40 (L) >60 ml/min/1.73m2    GFR est non-AA 33 (L) >60 ml/min/1.73m2    Calcium 8.9 8.5 - 10.1 MG/DL   MAGNESIUM    Collection Time: 10/23/19  2:45 AM   Result Value Ref Range    Magnesium 2.1 1.6 - 2.6 mg/dL   POC G3    Collection Time: 10/23/19  4:46 AM   Result Value Ref Range    Device: ROOM AIR      FIO2 (POC) 21 %    pH (POC) 7.407 7.35 - 7.45      pCO2 (POC) 42.0 35.0 - 45.0 MMHG    pO2 (POC) 56 (L) 80 - 100 MMHG    HCO3 (POC) 26.4 (H) 22 - 26 MMOL/L    sO2 (POC) 89 (L) 92 - 97 %    Base excess (POC) 2 mmol/L    Allens test (POC) YES      Total resp. rate 20      Site LEFT RADIAL      Patient temp. 98.6      Specimen type (POC) ARTERIAL      Performed by Libia Schultz    GLUCOSE, POC    Collection Time: 10/23/19  7:40 AM   Result Value Ref Range    Glucose (POC) 114 (H) 70 - 110 mg/dL   GLUCOSE, POC    Collection Time: 10/23/19 11:04 AM   Result Value Ref Range    Glucose (POC) 148 (H) 70 - 110 mg/dL   GLUCOSE, POC    Collection Time: 10/23/19  4:10 PM   Result Value Ref Range    Glucose (POC) 207 (H) 70 - 110 mg/dL           Recent Labs     10/23/19  0446   FIO2I 21   HCO3I 26.4*   PCO2I 42.0   PHI 7.407   PO2I 56*       Telemetry:normal sinus rhythm    Imaging:  I have personally reviewed the patients radiographs and have reviewed the reports.   No new studies        Author Zulay TRACY/MPH     Pulmonary, Critical Care Medicine  [facility]

## 2019-10-23 NOTE — PROGRESS NOTES
Problem: Dysphagia (Adult)  Goal: *Acute Goals and Plan of Care (Insert Text)  Description  Patient will:  1. Tolerate PO trials with 0 s/s overt distress in 4/5 trials  2. Utilize compensatory swallow strategies/maneuvers (decrease bite/sip, size/rate, alt. liq/sol) with min cues in 4/5 trials    Rec:     Soft solid with thin liquids  Aspiration precautions  HOB >45 during po intake, remain >30 for 30-45 minutes after po   Small bites/sips; alternate liquid/solid with slow feeding rate   Oral care TID  Meds per pt preference         Outcome: Progressing Towards Goal     SPEECH LANGUAGE PATHOLOGY DYSPHAGIA TREATMENT    Patient: Jono Butler (9 y.o. female)  Date: 10/23/2019  Diagnosis: Syncope [R55]  COPD exacerbation (HCC) [J44.1]  Acute on chronic respiratory failure with hypoxia (HCC) [J96.21]  Flash pulmonary edema (HCC) [J81.0]  Elevated d-dimer [R79.89]  COPD with acute exacerbation (HCC) [J44.1]  Respiratory failure requiring intubation (HCC) [J96.90]  Pulmonary edema cardiac cause (HCC) [I50.1] Acute on chronic respiratory failure with hypoxia (HCC)       Precautions: aspiration Skin  PLOF: As per H&P      ASSESSMENT:  Pt was seen at bedside for follow up dysphagia management. Pt observed tolerating soft solids with thin liquids without any overt s/sx of aspiration and positive oral clearance. Laryngeal elevation was weak to palpation. Continue to rec soft solid diet with thin liquids, aspiration precautions, oral care TID, and meds as tolerated. Reviewed safe swallowing techniques/strategies and role of SLP. ST to follow up x 1-2 more visits to ensure safety of PO. Progression toward goals:  ?         Improving appropriately and progressing toward goals  ? Improving slowly and progressing toward goals  ?          Not making progress toward goals and plan of care will be adjusted     PLAN:  Recommendations and Planned Interventions: See above  Patient continues to benefit from skilled intervention to address the above impairments. Continue treatment per established plan of care. Discharge Recommendations:  None     SUBJECTIVE:   Patient stated I'm doing fine. OBJECTIVE:   Cognitive and Communication Status:  Neurologic State: Alert  Orientation Level: Oriented X4  Cognition: Follows commands  Perception: Appears intact  Perseveration: No perseveration noted  Safety/Judgement: Fall prevention  Dysphagia Treatment:  Oral Assessment:  Oral Assessment  Labial: No impairment  Dentition: Edentulous  Oral Hygiene: adequate  Lingual: No impairment  Velum: No impairment  Mandible: No impairment  P.O. Trials:   Patient Position: [facility] at [facility]   Vocal quality prior to P.O.: No impairment   Consistency Presented: Thin liquid, Puree, Solid   How Presented: Self-fed/presented, Cup/sip, Spoon, Straw   Bolus Acceptance: No impairment   Bolus Formation/Control: Impaired   Type of Impairment: Delayed, Mastication   Propulsion: Delayed (# of seconds)   Oral Residue: None   Initiation of Swallow: No impairment   Laryngeal Elevation: Weak   Aspiration Signs/Symptoms: None   Pharyngeal Phase Characteristics: No impairment, issues, or problems    Effective Modifications: Small sips and bites   Cues for Modifications: Minimal       Oral Phase Severity: Mild   Pharyngeal Phase Severity : Mild    PAIN:  Start of Tx: 0  End of Tx: 0     After treatment:   ?              Patient left in no apparent distress sitting up in chair  ? Patient left in no apparent distress in bed  ? Call bell left within reach  ? Nursing notified  ? Family present  ? Caregiver present  ? Bed alarm activated    COMMUNICATION/EDUCATION:   ? Aspiration precautions; swallow safety; compensatory techniques  ?         Patient/family able to participate in training and education     Thank you for this referral.    Kelvin Lopez M.S. CCC-SLP/L  Speech-Language Pathologist

## 2019-10-23 NOTE — DIABETES MGMT
GLYCEMIC CONTROL AND NUTRITION    Assessment/Recommendations:  Fasting lab glucose this am 107 mg/dl  Noted steroids decreased to prednisone 20 mg daily  Continue corrective insulin coverage as needed  Will continue inpatient monitoring. Most recent blood glucose values:  Results for Kevin Cassette (MRN [id number]) as of 10/23/2019 11:03   Ref. Range 10/22/2019 07:56 10/22/2019 11:50 10/22/2019 16:14 10/22/2019 21:47 10/23/2019 07:40   GLUCOSE,FAST - POC Latest Ref Range: 70 - 110 mg/dL 159 (H) 163 (H) 253 (H) 152 (H) 114 (H)     Current A1C of 6.6_% is equivalent to average blood glucose of 143 mg/dl over the past 2-3 months. Current hospital diabetes medications:   Lispro corrective insulin coverage AC&HS  Previous day's insulin requirements:   Lispro 15 units corrective insulin  Home diabetes medications:  Verified with pt. Levemir 15 units daily  Humalog 2 units 3 times daily with meals  Diet:    Cardiac soft solids.  Consistent carb  Education:  ____Refer to Diabetes Education Record             __x_Education not indicated at this time      Erica Mason, [facility] CDE  Ext [phone]

## 2019-10-23 NOTE — PROGRESS NOTES
Problem: Falls - Risk of  Goal: *Absence of Falls  Description  Document Danis Alexander Fall Risk and appropriate interventions in the flowsheet.   Outcome: Progressing Towards Goal  Note:   Fall Risk Interventions:  Mobility Interventions: Bed/chair exit alarm    Mentation Interventions: Bed/chair exit alarm, Increase mobility    Medication Interventions: Bed/chair exit alarm    Elimination Interventions: Bed/chair exit alarm, Call light in reach    History of Falls Interventions: Bed/chair exit alarm

## 2019-10-23 NOTE — PROGRESS NOTES
[facility]  Pulmonary, Critical Care, and Sleep Medicine    Name: Desire Bailey MRN: [id number]   : 1950 Hospital: [address] [place] [address]   Date: 10/22/2019        Follow-up pulmonary consult note    Consulting physician: [facility]  Reason for consultation: Acute hypoxic respiratory failure, status post extubation      IMPRESSION:   · Acute hypoxic resp failure, likely multifactorial due to acute combined CHF exacerbation, PNA, probably less likely COPD given PFTs in past with restrictive pattern  · Intermittent hypertension and tachycardia  · Acute encephalopathy, etiology likely secondary to ICU delirium. · Non-cardiac, reproducible MSK chest pain -- likely costochondritis, cardiac workup unremarkable  · Acute on chronic combined CHF with moderate to severe MR  · CARA on CKD3-4  · Other co-morbids: CAD, DM2, HTN, HLD, obesity, former smoker  · Obesity: Body mass index is 37.76 kg/m².   ·      Patient Active Problem List   Diagnosis Code    Cardiomyopathy, dilated ([facility] I42.0    Environmental allergies Z91.09    Diabetes mellitus type 2, insulin dependent ([facility] E11.9, Z79.4    COPD (chronic obstructive pulmonary disease) ([facility]) J44.9    Essential hypertension I10    Iron deficiency anemia D50.9    Decreased GFR R94.4    Dyslipidemia, goal LDL below 70 E78.5    Former smoker Z87.891    Osteoarthritis of both knees M17.0    Chronic pain of both knees M25.561, M25.562, G89.29    Chronic pain syndrome G89.4    Primary osteoarthritis of both knees M17.0    Chronic obstructive pulmonary disease ([facility]) J44.9    Compliance with medication regimen Z91.89    Bilateral shoulder pain M25.511, M25.512    Elevated alkaline phosphatase level R74.8    Caregiver stress Z63.6    STEMI (ST elevation myocardial infarction) ([facility]) I21.3    Acute pulmonary edema ([facility]) J81.0    Coronary artery disease involving native coronary artery of native heart without angina pectoris I25.10  Pulmonary edema J81.1    Chest pain R07.9    SOB (shortness of breath) R06.02    Coronary artery disease involving native coronary artery with unstable angina pectoris ([facility]) I25.110    Type 2 diabetes with nephropathy ([facility]) E11.21    Severe obesity (BMI 35.0-39. 9) with comorbidity ([facility] E66.01    COPD exacerbation ([facility] J44.1    CAP (community acquired pneumonia) J18.9    Combined systolic and diastolic congestive heart failure ([facility]) I50.40    Type 2 diabetes mellitus with hyperglycemia ([facility]) E11.65    TESSA on CPAP G47.33, Z99.89    Ischemic cardiomyopathy I25.5    Syncope R55    Flash pulmonary edema ([facility]) J81.0    Elevated d-dimer R79.89    Acute on chronic respiratory failure with hypoxia ([facility]) J96.21    Pulmonary edema cardiac cause ([facility]) I50.1    COPD with acute exacerbation ([facility]) J44.1    Respiratory failure requiring intubation ([facility]) J96.90        RECOMMENDATIONS:   Given last night's episode of pulmonary edema with dyspnea, advised to maintain net negative fluid balance as renal function tolerates. Diuresis per primary service, Cardiology, Nephrology  Bipap QHS and PRN  Schedule bronchodilators: duonebs q6h, pulmicort nebs 1mg BID, and albuterol PRN  Okay to wean steroids rapidly  Please hold home bronchodilators. May resume on discharge  Supplemental oxygen to maintain SpO2 >88%  Please assess for home oxygen need prior to discharge  Aggressive pulmonary toileting/bronchial hygiene  Frequent incentive spirometry  Aspiration precautions including elevating HOB >30deg  PT/OT, OOB, ambulate with assistance as tolerated  DVT ppx per primary service  Will follow       Subjective/History:   10/22/19  Patient seen and examined at bedside. Overnight, patient had an episode of acute dyspnea, with chest x-ray showing increased pulmonary edema, improved with BiPAP and diuretic and ministration. Patient today, notes symptoms of dyspnea are improving.   Patient denies nausea, vomiting, diarrhea, chest pain. HPI:  Patient is a 81 y.o. female with PMH Significant for CAD, CMO, DM, HTN, Renal Insufficiency, HLD, COPD, & Former smoker whom presented to [facility] ED this evening with C/o SOB x 1 week. Per notes, she stated that her shortness of breath would cause her to become dizzy & 2 days prior she actually had an syncopal episode. Denies any head trauma. She stated she has been compliant with her COPD inhaler & this would result in intermittent relief of her shortness of breath. She denied any CP. Work-up in ED included CT imaging of head, PCXR, & labs. Labs significant for elevated D-dimer (1.09), Cr 1.49, BNP 1645. Head CT negative. PCXR with No PTX & Volume overloaded appearance with Sm. B/L Pleural effusion's (> R). While in ED, patient used the commode & after developed shortness of breath. Placed on N/c with improvement in 02 sat's & decreased shortness of breath. While patient was receiving Steroids (for AECOPD S/Sx's), patient developed respiratory distress resulting in arrest.  Patient was intubated  & [facility] consulted for ICU Admission & MV Mgmt. In addition, CTA Chest performed to R/o PE.  CTA Confirmed no PE, however B/L Lung consolidation c/w PNA with element of fluid overload. Based on CTA results, Pulmonary edema & pulmonary infiltrates identified by Dr. Janki Hudson. D/w [facility] ED Provider & lasix IVP given. Upon arrival to ICU, patient sedated & intubated. Will obtain BC's & Sputum Cx's. Will begin AECOPD regimen, to include ABX, steroids, & Nebs. Will check serial CE's & BNP daily. The patient is critically ill and can not provide additional history due to Ventilated.          Current Facility-Administered Medications   Medication Dose Route Frequency    [START ON 10/23/2019] predniSONE (DELTASONE) tablet 20 mg  20 mg Oral DAILY WITH BREAKFAST    [START ON 10/23/2019] bumetanide (BUMEX) tablet 1 mg  1 mg Oral DAILY    melatonin tablet 10 mg  10 mg Oral QHS    lidocaine 4 % patch 1 Patch  1 Patch TransDERmal Q24H    diclofenac (VOLTAREN) 1 % topical gel 2 g  2 g Topical QID    azithromycin (ZITHROMAX) tablet 500 mg  500 mg Oral Q24H    influenza vaccine 2019- (6 mos+)(PF) (FLUARIX/FLULAVAL/FLUZONE QUAD) injection 0.5 mL  0.5 mL IntraMUSCular PRIOR TO DISCHARGE    aspirin chewable tablet 81 mg  81 mg Oral DAILY    carvedilol (COREG) tablet 6.25 mg  6.25 mg Oral BID WITH MEALS    insulin lispro (HUMALOG) injection   SubCUTAneous AC&HS    heparin (porcine) injection 5,000 Units  5,000 Units SubCUTAneous Q8H    sodium chloride (NS) flush 5-40 mL  5-40 mL IntraVENous Q8H    budesonide (PULMICORT) 500 mcg/2 ml nebulizer suspension  500 mcg Nebulization BID RT    albuterol-ipratropium (DUO-NEB) 2.5 MG-0.5 MG/3 ML  3 mL Nebulization Q4H RT    multivit-folic acid-herbal 304 (WELLESSE PLUS) oral liquid 30 mL  30 mL Per NG tube DAILY    cefTRIAXone (ROCEPHIN) 2 g in sterile water (preservative free) 20 mL IV syringe  2 g IntraVENous Q24H     Review of Systems:  A comprehensive review of systems was negative except for that written in the HPI. Objective:   Vital Signs:    Visit Vitals  /56   Pulse 92   Temp 98.6 °F (37 °C)   Resp 16   Ht 5' 4\" (1.626 m)   Wt 99.8 kg (220 lb)   SpO2 100%   Breastfeeding? No   BMI 37.76 kg/m²       O2 Device: Room air   O2 Flow Rate (L/min): 0 l/min   Temp (24hrs), Av.3 °F (36.8 °C), Min:97.9 °F (36.6 °C), Max:98.7 °F (37.1 °C)       Intake/Output:   Last shift:      No intake/output data recorded. Last 3 shifts: 10/21 0701 - 10/22 1900  In: 6571 [P.O.:1620]  Out: 5225 [Urine:5225]    Intake/Output Summary (Last 24 hours) at 10/22/2019 2319  Last data filed at 10/22/2019 1808  Gross per 24 hour   Intake 940 ml   Output 3700 ml   Net -2760 ml     Physical Exam:     General:  Alert and oriented, comfortable on nasal cannula, sitting upright in bed   Head:  Normocephalic, without obvious abnormality, atraumatic.    Eyes:  Conjunctivae/corneas clear. PERRL, EOMI, sclera anicteric   Nose: Nares normal. Septum midline. Mucosa normal. No drainage or sinus tenderness. Neck: Supple, symmetrical, trachea midline, no adenopathy, no carotid bruit and no JVD. Lungs:   Symmetrical chest rise. CTABl, decreased bases without wheezes/rales/rhonchi   Heart:  RRR, S1, S2 normal, no m/r/g, +reproducible chest pain   Abdomen:   Soft, obese, non-tender. Bowel sounds hypoactive. No masses,  No organomegaly. Extremities: Extremities normal, atraumatic, no cyanosis or clubbing. Trace B/L LE Edema   Pulses: 2+ and symmetric all extremities.    Skin: Skin color, texture, turgor normal. No rashes or lesions   Neurologic: Alert and appropriate, oriented x3, grossly nonfocal, strength and coordination grossly intact throughout all extremities, no focal deficits         Data:     Recent Results (from the past 24 hour(s))   GLUCOSE, POC    Collection Time: 10/21/19 11:27 PM   Result Value Ref Range    Glucose (POC) 196 (H) 70 - 338 mg/dL   METABOLIC PANEL, BASIC    Collection Time: 10/22/19  3:30 AM   Result Value Ref Range    Sodium 139 136 - 145 mmol/L    Potassium 4.1 3.5 - 5.5 mmol/L    Chloride 106 100 - 111 mmol/L    CO2 26 21 - 32 mmol/L    Anion gap 7 3.0 - 18 mmol/L    Glucose 130 (H) 74 - 99 mg/dL    BUN 49 (H) 7.0 - 18 MG/DL    Creatinine 1.76 (H) 0.6 - 1.3 MG/DL    BUN/Creatinine ratio 28 (H) 12 - 20      GFR est AA 35 (L) >60 ml/min/1.73m2    GFR est non-AA 29 (L) >60 ml/min/1.73m2    Calcium 9.1 8.5 - 10.1 MG/DL   NT-PRO BNP    Collection Time: 10/22/19  3:30 AM   Result Value Ref Range    NT pro-BNP 4,929 (H) 0 - 900 PG/ML   CBC WITH AUTOMATED DIFF    Collection Time: 10/22/19  3:30 AM   Result Value Ref Range    WBC 9.2 4.6 - 13.2 K/uL    RBC 3.86 (L) 4.20 - 5.30 M/uL    HGB 9.4 (L) 12.0 - 16.0 g/dL    HCT 31.7 (L) 35.0 - 45.0 %    MCV 82.1 74.0 - 97.0 FL    MCH 24.4 24.0 - 34.0 PG    MCHC 29.7 (L) 31.0 - 37.0 g/dL    RDW 18.2 (H) 11.6 - 14.5 %    PLATELET 386 135 - 420 K/uL    MPV 10.2 9.2 - 11.8 FL    NEUTROPHILS 78 (H) 40 - 73 %    LYMPHOCYTES 14 (L) 21 - 52 %    MONOCYTES 8 3 - 10 %    EOSINOPHILS 0 0 - 5 %    BASOPHILS 0 0 - 2 %    ABS. NEUTROPHILS 7.1 1.8 - 8.0 K/UL    ABS. LYMPHOCYTES 1.3 0.9 - 3.6 K/UL    ABS. MONOCYTES 0.8 0.05 - 1.2 K/UL    ABS. EOSINOPHILS 0.0 0.0 - 0.4 K/UL    ABS. BASOPHILS 0.0 0.0 - 0.1 K/UL    DF AUTOMATED     MAGNESIUM    Collection Time: 10/22/19  3:30 AM   Result Value Ref Range    Magnesium 2.1 1.6 - 2.6 mg/dL   PHOSPHORUS    Collection Time: 10/22/19  3:30 AM   Result Value Ref Range    Phosphorus 4.3 2.5 - 4.9 MG/DL   CALCIUM, IONIZED    Collection Time: 10/22/19  3:30 AM   Result Value Ref Range    Ionized Calcium 1.17 1.12 - 1.32 MMOL/L   GLUCOSE, POC    Collection Time: 10/22/19  6:55 AM   Result Value Ref Range    Glucose (POC) 151 (H) 70 - 110 mg/dL   GLUCOSE, POC    Collection Time: 10/22/19  7:56 AM   Result Value Ref Range    Glucose (POC) 159 (H) 70 - 110 mg/dL   GLUCOSE, POC    Collection Time: 10/22/19 11:50 AM   Result Value Ref Range    Glucose (POC) 163 (H) 70 - 110 mg/dL   GLUCOSE, POC    Collection Time: 10/22/19  4:14 PM   Result Value Ref Range    Glucose (POC) 253 (H) 70 - 110 mg/dL   GLUCOSE, POC    Collection Time: 10/22/19  9:47 PM   Result Value Ref Range    Glucose (POC) 152 (H) 70 - 110 mg/dL           Recent Labs     10/20/19  0655   FIO2I 50   HCO3I 20.9*   PCO2I 34.4*   PHI 7.394   PO2I 94       Telemetry:normal sinus rhythm    Imaging:  I have personally reviewed the patients radiographs and have reviewed the reports.       Carmelina Boyce MD/MPH     Pulmonary, Critical Care Medicine  [address] [place] [address] [facility]

## 2019-10-23 NOTE — PROGRESS NOTES
SUBJECTIVE:    Patient is feeling okay. As per nursing, she has been walking back and forth to the bathroom without any problem. No chest pain other than epigastric pain. Denies for any shortness of breath the right cooperation. No nausea vomiting or abdominal pain no headache or dizziness. OBJECTIVE:    /77 (BP 1 Location: Left arm, BP Patient Position: At rest)   Pulse 94   Temp 97.9 °F (36.6 °C)   Resp 23   Ht 5' 4\" (1.626 m)   Wt 99.8 kg (220 lb)   SpO2 95%   Breastfeeding? No   BMI 37.76 kg/m²     General appearance - alert, well appearing, and in no distress  Chest - decreased air entry noted in bases, no wheezes  Heart - S1 and S2 normal  Abdomen - soft, epigastric tenderness patient, nondistended, Bowel sounds present  Neurological - alert, oriented, normal speech, moves all extremities while in bed  Extremities - no pedal edema noted    ASSESSMENT:    1. Acute hypoxic respiratory failure due to acute combined CHF exacerbation, pneumonia and less likely COPD status post extubation, currently much better. 2.  Non-cardiac reproducible musculoskeletal chest pain most likely costochondritis and/or GERD  3. Acute metabolic encephalopathy due to ICU delirium, now resolved  4. Reportedly syncope at the time of admission, no recurrence  5. Acute combined systolic and diastolic heart failure with EF of 46 percentage, clinically much better. 6.  Hypertension  7. Diabetes mellitus  8. Dyslipidemia  9. COPD without any exacerbation  10. Community-acquired pneumonia  11. Obstructive sleep apnea on home CPAP  12. Obesity  13. Acute renal failure, improving    PLAN:    Continue current management  Spoke to cardiologist, we will order Luis Cannon for the morning. PT and OT to follow this patient  Continue antibiotic until tomorrow for total of 7 days  Consider reducing prednisone to 10 mg after tomorrow's dose  Out of bed in chair during daytime, discussed with the patient about it.   We will add Carafate and monitor her epigastric pain.   Patient can be transferred to telemetry  floor    BMP:   Lab Results   Component Value Date/Time     10/23/2019 02:45 AM    K 4.1 10/23/2019 02:45 AM     10/23/2019 02:45 AM    CO2 27 10/23/2019 02:45 AM    AGAP 5 10/23/2019 02:45 AM     (H) 10/23/2019 02:45 AM    BUN 50 (H) 10/23/2019 02:45 AM    CREA 1.57 (H) 10/23/2019 02:45 AM    GFRAA 40 (L) 10/23/2019 02:45 AM    GFRNA 33 (L) 10/23/2019 02:45 AM     CBC:   Lab Results   Component Value Date/Time    WBC 7.8 10/23/2019 02:45 AM    HGB 9.0 (L) 10/23/2019 02:45 AM    HCT 30.1 (L) 10/23/2019 02:45 AM     10/23/2019 02:45 AM

## 2019-10-23 NOTE — PROGRESS NOTES
Progress Note    76y F with PMH HTN, CKD, COPD, heart failure, admitted for respiratory failure, seen for renal insufficieny  Subjective      Received diuretics, excellent urine output  Breathing is better     IMPRESSION:   CARA,   CKD 3, baseline creatinine arorund 1.4  Heavy proteinuria  Respiratory failure, acute, required intubation on this admission, now extubated  Heart failure  COPD  Anemia   Secondary hyperparathyroid of CKD   PLAN:   Ms. Nadia Green renal function continue improve, agree with current diuretics. Okay to discharge from renal stand point. She has CKD 3 with heavy proteinuria, will need outpatient follow up. Adjust meds per current renal function status.     Discussed with Dr. Caballero            Current Facility-Administered Medications   Medication Dose Route Frequency    predniSONE (DELTASONE) tablet 20 mg  20 mg Oral DAILY WITH BREAKFAST    bumetanide (BUMEX) tablet 1 mg  1 mg Oral DAILY    albuterol-ipratropium (DUO-NEB) 2.5 MG-0.5 MG/3 ML  3 mL Nebulization Q6H RT    melatonin tablet 10 mg  10 mg Oral QHS    lidocaine 4 % patch 1 Patch  1 Patch TransDERmal Q24H    diclofenac (VOLTAREN) 1 % topical gel 2 g  2 g Topical QID    azithromycin (ZITHROMAX) tablet 500 mg  500 mg Oral Q24H    tiZANidine (ZANAFLEX) tablet 2 mg  2 mg Oral QHS PRN    influenza vaccine 2019-20 (6 mos+)(PF) (FLUARIX/FLULAVAL/FLUZONE QUAD) injection 0.5 mL  0.5 mL IntraMUSCular PRIOR TO DISCHARGE    aspirin chewable tablet 81 mg  81 mg Oral DAILY    carvedilol (COREG) tablet 6.25 mg  6.25 mg Oral BID WITH MEALS    insulin lispro (HUMALOG) injection   SubCUTAneous AC&HS    heparin (porcine) injection 5,000 Units  5,000 Units SubCUTAneous Q8H    sodium chloride (NS) flush 5-40 mL  5-40 mL IntraVENous Q8H    sodium chloride (NS) flush 5-40 mL  5-40 mL IntraVENous PRN    acetaminophen (TYLENOL) tablet 650 mg  650 mg Oral Q6H PRN    ondansetron (ZOFRAN) injection 4 mg  4 mg IntraVENous Q6H PRN    glucose chewable tablet 16 g  4 Tab Oral PRN    glucagon (GLUCAGEN) injection 1 mg  1 mg IntraMUSCular PRN    dextrose (D50W) injection syrg 12.5-25 g  25-50 mL IntraVENous PRN    budesonide (PULMICORT) 500 mcg/2 ml nebulizer suspension  500 mcg Nebulization BID RT    multivit-folic acid-herbal 423 (WELLESSE PLUS) oral liquid 30 mL  30 mL Per NG tube DAILY    cefTRIAXone (ROCEPHIN) 2 g in sterile water (preservative free) 20 mL IV syringe  2 g IntraVENous Q24H       Review of Systems:     Not able to provide  Data Review:    Labs: Results:       Chemistry Recent Labs     10/23/19  0245 10/22/19  0330 10/21/19  0243   * 130* 139*    139 141   K 4.1 4.1 4.4    106 111   CO2 27 26 24   BUN 50* 49* 46*   CREA 1.57* 1.76* 2.03*   CA 8.9 9.1 8.6   AGAP 5 7 6   BUCR 32* 28* 23*      CBC w/Diff Recent Labs     10/23/19  0245 10/22/19  0330 10/21/19  0243   WBC 7.8 9.2 10.2   RBC 3.72* 3.86* 3.45*   HGB 9.0* 9.4* 8.5*   HCT 30.1* 31.7* 28.4*    386 333   GRANS 65 78* 78*   LYMPH 24 14* 14*   EOS 0 0 0      Coagulation No results for input(s): PTP, INR, APTT, INREXT, INREXT in the last 72 hours. Iron/Ferritin No results for input(s): IRON in the last 72 hours. No lab exists for component: TIBCCALC   BNP No results for input(s): BNPP in the last 72 hours. Cardiac Enzymes Recent Labs     10/20/19  1835 10/20/19  1230    120   CKND1 CALCULATION NOT PERFORMED WHEN RESULT IS BELOW LINEAR LIMIT CALCULATION NOT PERFORMED WHEN RESULT IS BELOW LINEAR LIMIT      Liver Enzymes No results for input(s): TP, ALB, TBIL, AP, SGOT, GPT in the last 72 hours.     No lab exists for component: DBIL   Thyroid Studies Lab Results   Component Value Date/Time    TSH 0.47 10/19/2019 08:40 PM         EKG: sinus     Physical Assessment:     Visit Vitals  /77 (BP 1 Location: Left arm, BP Patient Position: At rest)   Pulse 94   Temp 98.6 °F (37 °C)   Resp 23   Ht 5' 4\" (1.626 m)   Wt 99.8 kg (220 lb)   SpO2 95% Breastfeeding?  No   BMI 37.76 kg/m²     Weight change:     Intake/Output Summary (Last 24 hours) at 10/23/2019 1007  Last data filed at 10/23/2019 0800  Gross per 24 hour   Intake 680 ml   Output 2400 ml   Net -1720 ml     Physical Exam:   General: no acute respiratory distress   HEENT  supple neck, no thyromegaly  CVS: S1S2 heard,  no rub  RS: + air entry b/l,   Abd: Soft, Non tender,   Neuro: intubated, sedated  Extrm: + edema, no cyanosis, clubbing   Skin: no visible  Rash  Musculoskeletal: No gross joints or bone deformities     Procedures/imaging: see electronic medical records for all procedures, Xrays and details which were not copied into this note but were reviewed prior to creation of Kelsea Christiansen MD  October 23, 2019  [facility]  Office [phone]

## 2019-10-23 NOTE — PROGRESS NOTES
[facility]      CARDIOLOGY PROGRESS NOTE  RECS:  1. Respiratory Failure s/p Mechanical Ventilation - now extubated   2. Syncope CT Head: No acute findings.   3. Coronary artery disease Neg ACS  · 8/28/19 NST: Left ventricular function post-stress was abnormal. Wall motion was abnormal at stress as described below in the wall scoring diagram. Calculated ejection fraction is 44%. The TID ratio is 0.94. Left ventricular perfusion is abnormal.   Perfusion defect 1: There is a left ventricular perfusion defect that is moderate in size with a severe reduction in uptake affecting the mid-apical anterior and apex location(s) that is non-reversible. There is abnormal wall motion in the defect area. Viability in the area is poor. The defect appears to be infarction. Perfusion defect was visually present without quantitative evidence. · s/p YADIRA to proximal LAD with RESOLUTE stent 5/20/2018.  Acute stent thrombosis shortly after initial PCI on 5/20/2018 requiring emergency intubation for pulmonary edema and repeat cardiac cath and additional PCI/YADIRA to prox LAD.   4.  Chronic Heart failure with mild systolic dysfunction exacerbation            -improved very well clinically.   Repeat echo with EF 46%. No  recurrent dyspnea overnight. Per nephrology - may use  Lasix PRN. 5.   Hx of Ischemic cardiomyopathy- with EF 35% on echo  5/21/2018  with  elevated pulmonary pressures at 44mm Hg- now   with mild systolic  Dysfunction Recent Echo 8/7/19 showed  EF% 45%-50%. 6.  Hypertension- stable  on Entresto and bb   7. DM Type II- medications per medical team   8. Dyslipidemia- on Lipitor 20 mg. LDL 80 on 8/19   9. COPD exacerbation- managed by medical team   10. Hx of Tobacco abuse with   1 ppd X 50 years- patient quit in 2018  11. Obesity- weight loss advised  12. TESSA- uses CPAP at night   13.  Urinary tract infection        10/19/19 Echocardiogram:     · Left Ventricle: Mildly dilated left ventricle. Mild concentric hypertrophy. Mild-to-moderate systolic dysfunction. Calculated left ventricular ejection fraction is 46%. Biplane method used to measure ejection fraction. · Mitral Valve: Mitral valve thickening. Mitral annular calcification. Moderate mitral valve regurgitation is present. The mitral regurgitant jet is eccentric. · Interatrial Septum: Agitated saline contrast study was performed. No obvious septal defect.            ASSESSMENT:  Hospital Problems  Date Reviewed: 10/11/2019          Codes Class Noted POA    Pulmonary edema cardiac cause ([facility] ICD-10-CM: I50.1  ICD-9-CM: 255  10/19/2019 Unknown        COPD with acute exacerbation ([facility] ICD-10-CM: J44.1  ICD-9-CM: 491.21  10/19/2019 Unknown        Respiratory failure requiring intubation ([facility] ICD-10-CM: J96.90  ICD-9-CM: 518.81  10/19/2019 Unknown        Syncope ICD-10-CM: R55  ICD-9-CM: 780.2  10/18/2019 Unknown        Flash pulmonary edema ([facility] ICD-10-CM: J81.0  ICD-9-CM: 518.4  10/18/2019 Unknown        Elevated d-dimer ICD-10-CM: R79.89  ICD-9-CM: 790.92  10/18/2019 Unknown        * (Principal) Acute on chronic respiratory failure with hypoxia (HCC) ICD-10-CM: J96.21  ICD-9-CM: 518.84, 799.02  10/18/2019 Unknown        COPD exacerbation ([facility] ICD-10-CM: J44.1  ICD-9-CM: 491.21  8/25/2019 Unknown                SUBJECTIVE:  C/O chest pain with coughing  SOB improved    OBJECTIVE:    VS:   Visit Vitals  /77 (BP 1 Location: Left arm, BP Patient Position: At rest)   Pulse 94   Temp 98.6 °F (37 °C)   Resp 23   Ht 5' 4\" (1.626 m)   Wt 99.8 kg (220 lb)   SpO2 95%   Breastfeeding? No   BMI 37.76 kg/m²         Intake/Output Summary (Last 24 hours) at 10/23/2019 1011  Last data filed at 10/23/2019 0800  Gross per 24 hour   Intake 680 ml   Output 2400 ml   Net -1720 ml     TELE: normal sinus rhythm    General: alert and in no apparent distress  HENT: Normocephalic, atraumatic. Normal external eye.   Neck :  increased JVP  Cardiac: regular rate and rhythm, S1, S2 normal, no murmur, click, rub or gallop  Lungs: clear to auscultation bilaterally, diminished breath sounds B/L  Abdomen: Soft, nontender, no masses  Extremities:  No c/c/e, peripheral pulses present      Labs: Results:       Chemistry Recent Labs     10/23/19  0245 10/22/19  0330 10/21/19  0243   * 130* 139*    139 141   K 4.1 4.1 4.4    106 111   CO2 27 26 24   BUN 50* 49* 46*   CREA 1.57* 1.76* 2.03*   CA 8.9 9.1 8.6   AGAP 5 7 6   BUCR 32* 28* 23*      CBC w/Diff Recent Labs     10/23/19  0245 10/22/19  0330 10/21/19  0243   WBC 7.8 9.2 10.2   RBC 3.72* 3.86* 3.45*   HGB 9.0* 9.4* 8.5*   HCT 30.1* 31.7* 28.4*    386 333   GRANS 65 78* 78*   LYMPH 24 14* 14*   EOS 0 0 0      Cardiac Enzymes Recent Labs     10/20/19  1835 10/20/19  1230    120   CKND1 CALCULATION NOT PERFORMED WHEN RESULT IS BELOW LINEAR LIMIT CALCULATION NOT PERFORMED WHEN RESULT IS BELOW LINEAR LIMIT      Coagulation No results for input(s): PTP, INR, APTT, INREXT in the last 72 hours. Lipid Panel Lab Results   Component Value Date/Time    Cholesterol, total 152 10/10/2019 12:00 AM    HDL Cholesterol 46 10/10/2019 12:00 AM    LDL, calculated 86 10/10/2019 12:00 AM    VLDL, calculated 20 10/10/2019 12:00 AM    Triglyceride 101 10/10/2019 12:00 AM    CHOL/HDL Ratio 5.6 (H) 05/21/2018 06:23 AM      BNP No results for input(s): BNPP in the last 72 hours. Liver Enzymes No results for input(s): TP, ALB, TBIL, AP, SGOT, GPT in the last 72 hours. No lab exists for component: DBIL   Thyroid Studies Lab Results   Component Value Date/Time    TSH 0.47 10/19/2019 08:40 PM              Xenia Hurtado NP   Pager # [phone]    I have independently evaluated taken history and examined the patient. All relevant labs and testing data's are reviewed. Care plan discussed and updated after review.   Ayanna Olvera MD

## 2019-10-24 ENCOUNTER — APPOINTMENT (OUTPATIENT)
Dept: NON INVASIVE DIAGNOSTICS | Age: 69
DRG: 208 | End: 2019-10-24
Attending: INTERNAL MEDICINE
Payer: MEDICARE

## 2019-10-24 PROBLEM — I25.10 CAD (CORONARY ARTERY DISEASE): Status: ACTIVE | Noted: 2019-10-18

## 2019-10-24 LAB
ANION GAP SERPL CALC-SCNC: 7 MMOL/L (ref 3–18)
BASOPHILS # BLD: 0 K/UL (ref 0–0.1)
BASOPHILS NFR BLD: 0 % (ref 0–2)
BUN SERPL-MCNC: 45 MG/DL (ref 7–18)
BUN/CREAT SERPL: 30 (ref 12–20)
CALCIUM SERPL-MCNC: 8.9 MG/DL (ref 8.5–10.1)
CHLORIDE SERPL-SCNC: 104 MMOL/L (ref 100–111)
CO2 SERPL-SCNC: 26 MMOL/L (ref 21–32)
CREAT SERPL-MCNC: 1.5 MG/DL (ref 0.6–1.3)
DIFFERENTIAL METHOD BLD: ABNORMAL
EOSINOPHIL # BLD: 0.1 K/UL (ref 0–0.4)
EOSINOPHIL NFR BLD: 2 % (ref 0–5)
ERYTHROCYTE [DISTWIDTH] IN BLOOD BY AUTOMATED COUNT: 18.2 % (ref 11.6–14.5)
GLUCOSE BLD STRIP.AUTO-MCNC: 133 MG/DL (ref 70–110)
GLUCOSE BLD STRIP.AUTO-MCNC: 148 MG/DL (ref 70–110)
GLUCOSE BLD STRIP.AUTO-MCNC: 180 MG/DL (ref 70–110)
GLUCOSE BLD STRIP.AUTO-MCNC: 272 MG/DL (ref 70–110)
GLUCOSE SERPL-MCNC: 94 MG/DL (ref 74–99)
HCT VFR BLD AUTO: 31.3 % (ref 35–45)
HGB BLD-MCNC: 9.4 G/DL (ref 12–16)
LYMPHOCYTES # BLD: 2.9 K/UL (ref 0.9–3.6)
LYMPHOCYTES NFR BLD: 33 % (ref 21–52)
MAGNESIUM SERPL-MCNC: 2.1 MG/DL (ref 1.6–2.6)
MCH RBC QN AUTO: 24.4 PG (ref 24–34)
MCHC RBC AUTO-ENTMCNC: 30 G/DL (ref 31–37)
MCV RBC AUTO: 81.1 FL (ref 74–97)
MONOCYTES # BLD: 0.9 K/UL (ref 0.05–1.2)
MONOCYTES NFR BLD: 10 % (ref 3–10)
NEUTS SEG # BLD: 4.9 K/UL (ref 1.8–8)
NEUTS SEG NFR BLD: 55 % (ref 40–73)
PLATELET # BLD AUTO: 438 K/UL (ref 135–420)
PMV BLD AUTO: 10.1 FL (ref 9.2–11.8)
POTASSIUM SERPL-SCNC: 3.9 MMOL/L (ref 3.5–5.5)
RBC # BLD AUTO: 3.86 M/UL (ref 4.2–5.3)
SODIUM SERPL-SCNC: 137 MMOL/L (ref 136–145)
WBC # BLD AUTO: 8.8 K/UL (ref 4.6–13.2)

## 2019-10-24 PROCEDURE — 74011250637 HC RX REV CODE- 250/637: Performed by: INTERNAL MEDICINE

## 2019-10-24 PROCEDURE — 74011000250 HC RX REV CODE- 250: Performed by: PHYSICIAN ASSISTANT

## 2019-10-24 PROCEDURE — 94640 AIRWAY INHALATION TREATMENT: CPT

## 2019-10-24 PROCEDURE — 97530 THERAPEUTIC ACTIVITIES: CPT

## 2019-10-24 PROCEDURE — 74011250636 HC RX REV CODE- 250/636: Performed by: PHYSICIAN ASSISTANT

## 2019-10-24 PROCEDURE — 97116 GAIT TRAINING THERAPY: CPT

## 2019-10-24 PROCEDURE — 74011000250 HC RX REV CODE- 250: Performed by: INTERNAL MEDICINE

## 2019-10-24 PROCEDURE — 82962 GLUCOSE BLOOD TEST: CPT

## 2019-10-24 PROCEDURE — 85025 COMPLETE CBC W/AUTO DIFF WBC: CPT

## 2019-10-24 PROCEDURE — 36415 COLL VENOUS BLD VENIPUNCTURE: CPT

## 2019-10-24 PROCEDURE — 74011636637 HC RX REV CODE- 636/637: Performed by: INTERNAL MEDICINE

## 2019-10-24 PROCEDURE — 83735 ASSAY OF MAGNESIUM: CPT

## 2019-10-24 PROCEDURE — 92526 ORAL FUNCTION THERAPY: CPT

## 2019-10-24 PROCEDURE — 97535 SELF CARE MNGMENT TRAINING: CPT

## 2019-10-24 PROCEDURE — 80048 BASIC METABOLIC PNL TOTAL CA: CPT

## 2019-10-24 PROCEDURE — 74011250637 HC RX REV CODE- 250/637: Performed by: PHYSICIAN ASSISTANT

## 2019-10-24 PROCEDURE — 65660000000 HC RM CCU STEPDOWN

## 2019-10-24 PROCEDURE — 74011250636 HC RX REV CODE- 250/636: Performed by: INTERNAL MEDICINE

## 2019-10-24 PROCEDURE — 94761 N-INVAS EAR/PLS OXIMETRY MLT: CPT

## 2019-10-24 PROCEDURE — 74011250637 HC RX REV CODE- 250/637: Performed by: NURSE PRACTITIONER

## 2019-10-24 RX ORDER — DEXTROSE MONOHYDRATE 100 MG/ML
125-250 INJECTION, SOLUTION INTRAVENOUS AS NEEDED
Status: DISCONTINUED | OUTPATIENT
Start: 2019-10-24 | End: 2019-10-27 | Stop reason: HOSPADM

## 2019-10-24 RX ORDER — ATORVASTATIN CALCIUM 20 MG/1
20 TABLET, FILM COATED ORAL
Status: DISCONTINUED | OUTPATIENT
Start: 2019-10-24 | End: 2019-10-27 | Stop reason: HOSPADM

## 2019-10-24 RX ORDER — TRAMADOL HYDROCHLORIDE 50 MG/1
50 TABLET ORAL
Status: DISCONTINUED | OUTPATIENT
Start: 2019-10-24 | End: 2019-10-27 | Stop reason: HOSPADM

## 2019-10-24 RX ADMIN — PANTOPRAZOLE SODIUM 40 MG: 40 TABLET, DELAYED RELEASE ORAL at 08:18

## 2019-10-24 RX ADMIN — PREDNISONE 10 MG: 10 TABLET ORAL at 08:18

## 2019-10-24 RX ADMIN — INSULIN LISPRO 6 UNITS: 100 INJECTION, SOLUTION INTRAVENOUS; SUBCUTANEOUS at 11:07

## 2019-10-24 RX ADMIN — ACETAMINOPHEN 650 MG: 325 TABLET ORAL at 11:07

## 2019-10-24 RX ADMIN — Medication 10 ML: at 15:11

## 2019-10-24 RX ADMIN — Medication 30 ML: at 08:17

## 2019-10-24 RX ADMIN — IPRATROPIUM BROMIDE AND ALBUTEROL SULFATE 3 ML: .5; 3 SOLUTION RESPIRATORY (INHALATION) at 08:06

## 2019-10-24 RX ADMIN — IPRATROPIUM BROMIDE AND ALBUTEROL SULFATE 3 ML: .5; 3 SOLUTION RESPIRATORY (INHALATION) at 21:10

## 2019-10-24 RX ADMIN — DICLOFENAC 2 G: 10 GEL TOPICAL at 08:18

## 2019-10-24 RX ADMIN — TRAMADOL HYDROCHLORIDE 50 MG: 50 TABLET ORAL at 17:37

## 2019-10-24 RX ADMIN — BUDESONIDE 500 MCG: 0.5 INHALANT RESPIRATORY (INHALATION) at 21:13

## 2019-10-24 RX ADMIN — IPRATROPIUM BROMIDE AND ALBUTEROL SULFATE 3 ML: .5; 3 SOLUTION RESPIRATORY (INHALATION) at 17:09

## 2019-10-24 RX ADMIN — CARVEDILOL 6.25 MG: 12.5 TABLET, FILM COATED ORAL at 08:18

## 2019-10-24 RX ADMIN — Medication 5 ML: at 06:44

## 2019-10-24 RX ADMIN — BUMETANIDE 1 MG: 1 TABLET ORAL at 08:18

## 2019-10-24 RX ADMIN — IPRATROPIUM BROMIDE AND ALBUTEROL SULFATE 3 ML: .5; 3 SOLUTION RESPIRATORY (INHALATION) at 01:13

## 2019-10-24 RX ADMIN — SUCRALFATE 1 G: 1 TABLET ORAL at 23:09

## 2019-10-24 RX ADMIN — CARVEDILOL 6.25 MG: 12.5 TABLET, FILM COATED ORAL at 17:37

## 2019-10-24 RX ADMIN — ATORVASTATIN CALCIUM 20 MG: 20 TABLET, FILM COATED ORAL at 23:09

## 2019-10-24 RX ADMIN — Medication 10 ML: at 23:08

## 2019-10-24 RX ADMIN — WATER 2 G: 1 INJECTION INTRAMUSCULAR; INTRAVENOUS; SUBCUTANEOUS at 23:09

## 2019-10-24 RX ADMIN — AZITHROMYCIN 500 MG: 250 TABLET, FILM COATED ORAL at 23:09

## 2019-10-24 RX ADMIN — SUCRALFATE 1 G: 1 TABLET ORAL at 11:07

## 2019-10-24 RX ADMIN — BUDESONIDE 500 MCG: 0.5 INHALANT RESPIRATORY (INHALATION) at 08:06

## 2019-10-24 RX ADMIN — Medication 81 MG: at 08:18

## 2019-10-24 RX ADMIN — PANTOPRAZOLE SODIUM 40 MG: 40 TABLET, DELAYED RELEASE ORAL at 17:37

## 2019-10-24 RX ADMIN — MELATONIN TAB 5 MG 5 MG: 5 TAB at 23:17

## 2019-10-24 RX ADMIN — SUCRALFATE 1 G: 1 TABLET ORAL at 08:18

## 2019-10-24 RX ADMIN — SUCRALFATE 1 G: 1 TABLET ORAL at 17:38

## 2019-10-24 NOTE — PROGRESS NOTES
Problem: Self Care Deficits Care Plan (Adult)  Goal: *Acute Goals and Plan of Care (Insert Text)  Description  Occupational Therapy Goals  Initiated 10/21/2019 within 7 day(s). 1.  Patient will perform functional activity seated EOB with modified independence for 3-5 minutes and G balance. 2.  Patient will perform upper body dressing with modified independence. 3.  Patient will perform lower body dressing with supervision/set-up using AE. 4.  Patient will perform toilet transfers with supervision/set-up. 5.  Patient will perform all aspects of toileting with supervision/set-up. 6.  Patient will participate in upper extremity therapeutic exercise/activities with modified independence for 5-7 minutes. 7.  Patient will utilize energy conservation techniques during functional activities with verbal cues. Prior Level of Function: Patient was independent with self-care and used a cane for functional mobility PTA. 10/24/2019 0928 by Elaine Kay  Outcome: Resolved/Met  OCCUPATIONAL THERAPY TREATMENT/DISCHARGE    Patient: Rosemary Zhao (78 y.o. female)  Date: 10/24/2019  Diagnosis: Syncope [R55]  COPD exacerbation ([address] [J44.1]  Acute on chronic respiratory failure with hypoxia (HCC) [J96.21]  Flash pulmonary edema (HCC) [J81.0]  Elevated d-dimer [R79.89]  COPD with acute exacerbation (HCC) [J44.1]  Respiratory failure requiring intubation ([address] [J96.90]  Pulmonary edema cardiac cause (HCC) [I50.1] Acute on chronic respiratory failure with hypoxia (HCC)       Precautions: Skin    Chart, occupational therapy assessment, plan of care, and goals were reviewed. ASSESSMENT:  Pt was cleared to participate in OT by nursing. Pt is sitting in the chair, agreeable to participate in ADLs and toilet txfr training. Pt was able to perform/simulate all aspects of UB dressing w/Mod Ind, required Supervision for std aspects of LB ADLs 2/2 mult line management.  Pt maneuvered to the sink, completed std grooming task w/o LOB and w/good safety awareness. Pt then completed home management task (bed making w/Supervision demonstrating tolerance of ~5 min std activity w/o RBs. Pt educated on mult energy conservation techniques to utilize in home environment, including pacing and deep breathing to prevent SOB and fatigue, and increase activity tolerance and safety w/ADLs and functional mobility, pt verbalized understanding, issued EC handout to the pt, was able to follow techniques throughout tasks w/o VCs. Patient issued reacher to assist with LBD as well. Pt educated on BUE TherEx in mult planes to improve endurance for carryover w/std ADLs, pt verbalized and demos understanding, reports she will be performing TherEx independently. Pt has met her acute OT goals, we will DC pt from skilled OT at this time. PLAN:  Patient will be discharged from occupational therapy at this time. Rationale for discharge:  ? Goals Achieved  ? Plateau Reached  ? Patient not participating in therapy  ? Other:  Discharge Recommendations:  Home Health for safety eval  Further Equipment Recommendations for Discharge:  shower chair     SUBJECTIVE:   Patient stated I don't think I want therapy, I will use these notes and take my time.     OBJECTIVE DATA SUMMARY:   Cognitive/Behavioral Status:  Neurologic State: Alert  Orientation Level: Oriented X4  Cognition: Follows commands  Safety/Judgement: Fall prevention    Functional Mobility and Transfers for ADLs:   Bed Mobility:  Rolling: Modified independent  Supine to Sit: Modified independent  Sit to Supine: Modified independent      Transfers:  Sit to Stand: Modified independent  Stand to Sit: Modified independent   Toilet Transfer : Supervision      Balance:  Sitting: Intact  Standing: Impaired  Standing - Static: Good  Standing - Dynamic : Fair(+)    ADL Intervention:       Grooming  Grooming Assistance: Modified independent(std)  Washing Hands: Modified independent    Upper Body Dressing Assistance  Hospital Gown: Modified independent  Shirt simulation with hospital gown: Modified independent    Lower Body Dressing Assistance  Underpants: Supervision(simulated)  Socks: Supervision    Toileting  Toileting Assistance: Supervision(simulated)  UE Therapeutic Exercises:   Educated on BUE TherEx, pt verbalized and demo understanding  Pain:  Pain level pre-treatment: 0/10   Pain level post-treatment: 0/10     Activity Tolerance:    Fair  Please refer to the flowsheet for vital signs taken during this treatment. After treatment:   ?  Patient left in no apparent distress sitting up in chair  ? Patient left in no apparent distress in bed  ? Call bell left within reach  ? Nursing notified  ? Caregiver present  ? Bed alarm activated    COMMUNICATION/EDUCATION:   ?      Role of Occupational Therapy in the acute care setting  ? Home safety education was provided and the patient/caregiver indicated understanding. ? Patient/family have participated as able and agree with findings and recommendations. ?      Patient is unable to participate in plan of care at this time. Thank you for allowing me to assist in the care of this patient.   HUGO Thornton/L  Time Calculation: 28 mins

## 2019-10-24 NOTE — PROGRESS NOTES
[facility] Hospitalist Group  Progress Note    Patient: Da Rahman Age: 81 y.o. : 1950 MR#: [id number] SSN: [id number]  Date: 10/24/2019     Subjective:   Alert and oriented x3. No acute distress. Denies any shortness of breath, no chest pain or chest discomfort. No nausea, vomiting, diarrhea, constipation. No abdominal pain or discomfort. No overnight events. Assessment/Plan:     1. Acute hypoxic respiratory failure due to acute combined CHF exacerbation, pneumonia and less likely COPD status post extubation, currently much better.,  Neurology following. Continue current IV antibiotic regimen, nebs, bronchodilators, bronchial hygiene protocol, incentive spirometer. 2.  Chest pain. Concern for angina with episodes of pulmonary edema and ongoing chest pain. Cardiology plans for cardiac cath in AM to evaluate stents and r/o ischemia. NPO after midnight. 3.  Acute combined systolic and diastolic heart failure with EF of 46 percentage, clinically much better. Continue oral Bumex per cardiology recommendations. Continue strict I&O monitoring, daily weights. continue Coreg, ASA. 4. Acute metabolic encephalopathy due to ICU delirium, now resolved  5. Reportedly syncope at the time of admission, no recurrence  6. Hypertension. Stable  7. Diabetes mellitus. Continue SSI  8. Dyslipidemia. On statin   9. COPD without any exacerbation. 10.  Obstructive sleep apnea on home CPAP  11. Obesity  12. 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 Stable. Additional Notes:      Case discussed with:  [x]Patient  []Family  [x]Nursing  []Case Management  DVT Prophylaxis:  []Lovenox  [x]Hep SQ  []SCDs  []Coumadin   []On Heparin gtt    Objective:   VS:   Visit Vitals  /70 (BP 1 Location: Left arm, BP Patient Position: At rest)   Pulse 86   Temp 97.9 °F (36.6 °C)   Resp 20   Ht 5' 4\" (1.626 m)   Wt 99.8 kg (220 lb 0.3 oz)   SpO2 100%   Breastfeeding?  No   BMI 37.77 kg/m²      Tmax/24hrs: Temp (24hrs), Av.9 °F (36.6 °C), Min:97.9 °F (36.6 °C), Max:98 °F (36.7 °C)      Intake/Output Summary (Last 24 hours) at 10/24/2019 1243  Last data filed at 10/24/2019 0800  Gross per 24 hour   Intake 240 ml   Output --   Net 240 ml       General:  Alert, NAD. Obese   Cardiovascular:  RRR  Pulmonary: diminished bll; no wheeze   GI:  +BS in all four quadrants, soft, non-tender  Extremities:  No edema; 2+ dorsalis pedis pulses bilaterally  Neuro: alert and oriented x 4    Labs:    Recent Results (from the past 24 hour(s))   GLUCOSE, POC    Collection Time: 10/23/19  4:10 PM   Result Value Ref Range    Glucose (POC) 207 (H) 70 - 110 mg/dL   GLUCOSE, POC    Collection Time: 10/23/19 10:25 PM   Result Value Ref Range    Glucose (POC) 170 (H) 70 - 110 mg/dL   CBC WITH AUTOMATED DIFF    Collection Time: 10/24/19  4:38 AM   Result Value Ref Range    WBC 8.8 4.6 - 13.2 K/uL    RBC 3.86 (L) 4.20 - 5.30 M/uL    HGB 9.4 (L) 12.0 - 16.0 g/dL    HCT 31.3 (L) 35.0 - 45.0 %    MCV 81.1 74.0 - 97.0 FL    MCH 24.4 24.0 - 34.0 PG    MCHC 30.0 (L) 31.0 - 37.0 g/dL    RDW 18.2 (H) 11.6 - 14.5 %    PLATELET 140 (H) 233 - 420 K/uL    MPV 10.1 9.2 - 11.8 FL    NEUTROPHILS 55 40 - 73 %    LYMPHOCYTES 33 21 - 52 %    MONOCYTES 10 3 - 10 %    EOSINOPHILS 2 0 - 5 %    BASOPHILS 0 0 - 2 %    ABS. NEUTROPHILS 4.9 1.8 - 8.0 K/UL    ABS. LYMPHOCYTES 2.9 0.9 - 3.6 K/UL    ABS. MONOCYTES 0.9 0.05 - 1.2 K/UL    ABS. EOSINOPHILS 0.1 0.0 - 0.4 K/UL    ABS.  BASOPHILS 0.0 0.0 - 0.1 K/UL    DF AUTOMATED     METABOLIC PANEL, BASIC    Collection Time: 10/24/19  4:38 AM   Result Value Ref Range    Sodium 137 136 - 145 mmol/L    Potassium 3.9 3.5 - 5.5 mmol/L    Chloride 104 100 - 111 mmol/L    CO2 26 21 - 32 mmol/L    Anion gap 7 3.0 - 18 mmol/L    Glucose 94 74 - 99 mg/dL    BUN 45 (H) 7.0 - 18 MG/DL    Creatinine 1.50 (H) 0.6 - 1.3 MG/DL    BUN/Creatinine ratio 30 (H) 12 - 20      GFR est AA 42 (L) >60 ml/min/1.73m2    GFR est non-AA 35 (L) >60 ml/min/1.73m2    Calcium 8.9 8.5 - 10.1 MG/DL   MAGNESIUM    Collection Time: 10/24/19  4:38 AM   Result Value Ref Range    Magnesium 2.1 1.6 - 2.6 mg/dL   GLUCOSE, POC    Collection Time: 10/24/19 11:05 AM   Result Value Ref Range    Glucose (POC) 272 (H) 70 - 110 mg/dL       Signed By: Shea Parmar NP     October 24, 2019

## 2019-10-24 NOTE — PROGRESS NOTES
RESPIRATORY CARE ASSESSMENT FOR BRONCHIAL HYGIENE OR LUNG EXPANSION THERAPY  Patient  John Perry     81 y.o.   female     10/24/2019  6:08 PM  Patient Active Problem List   Diagnosis Code    Cardiomyopathy, dilated ([email or web address] I42.0    Environmental allergies Z91.09    Diabetes mellitus type 2, insulin dependent ([facility] E11.9, Z79.4    COPD (chronic obstructive pulmonary disease) ([facility]) J44.9    Essential hypertension I10    Iron deficiency anemia D50.9    Decreased GFR R94.4    Dyslipidemia, goal LDL below 70 E78.5    Former smoker Z87.891    Osteoarthritis of both knees M17.0    Chronic pain of both knees M25.561, M25.562, G89.29    Chronic pain syndrome G89.4    Primary osteoarthritis of both knees M17.0    Chronic obstructive pulmonary disease ([facility]) J44.9    Compliance with medication regimen Z91.89    Bilateral shoulder pain M25.511, M25.512    Elevated alkaline phosphatase level R74.8    Caregiver stress Z63.6    STEMI (ST elevation myocardial infarction) ([facility]) I21.3    Acute pulmonary edema ([facility]) J81.0    Coronary artery disease involving native coronary artery of native heart without angina pectoris I25.10    Pulmonary edema J81.1    Chest pain R07.9    SOB (shortness of breath) R06.02    Coronary artery disease involving native coronary artery with unstable angina pectoris ([facility]) I25.110    Type 2 diabetes with nephropathy ([facility]) E11.21    Severe obesity (BMI 35.0-39. 9) with comorbidity ([facility] E66.01    COPD exacerbation ([facility] J44.1    CAP (community acquired pneumonia) J18.9    Combined systolic and diastolic congestive heart failure ([facility]) I50.40    Type 2 diabetes mellitus with hyperglycemia ([facility]) E11.65    TESSA on CPAP G47.33, Z99.89    Ischemic cardiomyopathy I25.5    Syncope R55    Flash pulmonary edema ([facility]) J81.0    Elevated d-dimer R79.89    Acute on chronic respiratory failure with hypoxia ([facility]) J96.21    Pulmonary edema cardiac cause ([facility]) I50.1    COPD with acute exacerbation ([facility] J44.1    Respiratory failure requiring intubation ([facility] J96.90    CAD (coronary artery disease) I25.10       ABG:  Date:10/24/2019  No results found for: PH, PHI, PCO2, PCO2I, PO2, PO2I, HCO3, HCO3I, FIO2, FIO2I    Chest X-ray:  Date:10/24/2019  Results from Hospital Encounter encounter on 10/18/19   XR CHEST PORT    Narrative PORTABLE CHEST X-RAY    CPT CODE: 03233     INDICATION: Wheezing. Respiratory distress. .    COMPARISON: Plain film 0956 hours earlier same day. FINDINGS:   Heart size top normal, similar to previous. Overall lower lung volumes. No pneumothorax appreciated. Interval slight increase in extent of reticular  nodular opacities throughout both lungs. Findings could reflect slight worsening  of pulmonary edema. Inflammatory process such as viral pneumonia not excluded. Blunting of the left costophrenic angle consistent with residual small effusion. No consolidation. Impression IMPRESSION:    1. Slightly progressed reticular nodular airspace opacities with small left  effusion as above.        Lab Test:  Date:10/24/2019  WBC:   Lab Results   Component Value Date/Time    WBC 8.8 10/24/2019 04:38 AM   HGB:   Lab Results   Component Value Date/Time    HGB 9.4 (L) 10/24/2019 04:38 AM    PLTS:   Lab Results   Component Value Date/Time    PLATELET 513 (H) 43/25/9514 04:38 AM       SaO2%/flow: @SICEIXI1(0)@    Vital Signs:     Patient Vitals for the past 8 hrs:   Temp Pulse Resp BP SpO2   10/24/19 1714 -- -- -- -- 96 %   10/24/19 1512 98.2 °F (36.8 °C) 83 19 113/72 100 %   10/24/19 1320 97.8 °F (36.6 °C) 93 20 122/81 93 %   10/24/19 1200 97.9 °F (36.6 °C) 86 20 139/70 --         RA/O2 flow/device:RA      First Inital Assesment:     [x]Yes []No   Reevaluation/Reassessment:    []Yes [x]No     CHART REVIEW   Points 0 X 1 X 2 X 3 X 4 X Points   Pulmonary History Smoking History (1) none  Recent Smoking History <1 PPD  Recent Smoking History >1 PPD  Pulmonary Disease or Impairment Severe Pulmonary Disease  2   Surgical History No Surgery  General Surgery  Lower Abdominal  Thoracic or Upper Abdominal  Thoracic & Pulmonary Disease  0   CXR Clear or not indicated  Chronic changes or CXR Pending  Infiltrate, atelectasis or pleural effusions  Infiltrates in more than 1lobe  Infiltrates +atelectasis  +/or pleural effusions  2     PATIENT ASSESSMENT    0 X 1 X 2 X 3 X 4 X Points   Respiratory Pattern Regular pattern RR 12-20  Increased RR 21-27   Mild Dyspnea at rest, irregular pattern RR 28-32  Moderate Dyspnea at rest, Use of accessory muscles, RR 33-36  Severe Dyspnea, Use of accessory muscles RR >36  0   Mental Status Alert Oriented cooperative  Confused, Follows commands  Lethargic, Does not follow commands  Obtunded  Unresponsive  0   Breath Sounds Clear  Decreased Unilaterally  Decreased Bilaterally  Mild Scattered wheezing or Crackles in bases  Severe Wheezing or rhonchi  2   Cough Strong dry NPC  Strong Productive  Weak NPC  Weak productive or weak with rhonchi  No cough or may require suctioning  0   Level of Activity Ambulatory  Ambulatory with assistance  Temporarily Non-ambulatory  Non-Ambulatory, able to position self  Unable to position self, confined to bed  1   Total Points/Score:   7     Specific Intervention Chart()    Bronchial Hygiene/Secretion Clearance:    []EZPAP []Rotation bed with vibration    []CPT with percussor []CPT via vest   [x]Oscillastiang positive pressure expiratory device      Lung Expansion:    []Incentive Spirometer w/RT visits []Incentive Spirometer w/nursing    []EZPAP [] Metaneb     *Suctioning:    []Nasal Tracheal []Tracheal     *suctioning will be ordered and done PRN with an associated frequency such as QID/PRN based on score       Other:    Care Plan   Level # Score Modality Frequency Comment   Level 1 >17      Level 2 14-17      Level 3 10-13      Level 4 1-9 Aerobika PRN      BRONCHIAL HYGIENE SCORING AND FREQUENCY GUIDELINES   Frequency Indications/Findings Level #   Q4 ATC Copious secretions, SOB, unable to sleep 1   QID & PRN at night Moderate amounts of secretions 2   TID or Q6 wa Small amounts of secretions and poor cough: recent history of secretions 3   BID or Q8 wa Unable to deep breathe and cough effectively 4        Comments:  Aerobika PRN to help mobilize and clear secretions    Respiratory Therapist: Monae Garcia, RT

## 2019-10-24 NOTE — PROGRESS NOTES
ARU declined pt due to pt being too functional. PT/OT recommending home health. Will arrange [facility] once order received.  Breanna Marc, [phone]

## 2019-10-24 NOTE — PROGRESS NOTES
ARU/IPR REFERRAL CONTACT NOTE  [address] [place] [address] for Physical Rehabilitation    RE: William Swartz     Thank you for the opportunity to review this patient's case for admission to [address] [place] [address] for Physical Rehabilitation. Based on our pre-admission screening:     [x ] This patient does not meet criteria for admission to [facility] for Physical        Rehabilitation due to:      [x ] Too functional, per documentation, patient does not require both Physical and Occupational Therapy Services at an acute rehabilitation level of intensity. Again, Thank you for this referral. Should you have any questions please do not hesitate to call. Sincerely,  Anushka Israel. Arturo Mcguire, [address]  Arturo Mcguire, RN  Admissions [facility] for Physical Rehabilitation  [phone]

## 2019-10-24 NOTE — PROGRESS NOTES
Problem: Dysphagia (Adult)  Goal: *Acute Goals and Plan of Care (Insert Text)  Description  Patient will:  1. Tolerate PO trials with 0 s/s overt distress in 4/5 trials - met  2. Utilize compensatory swallow strategies/maneuvers (decrease bite/sip, size/rate, alt. liq/sol) with min cues in 4/5 trials - met    Rec:     Soft solid with thin liquids  Aspiration precautions  HOB >45 during po intake, remain >30 for 30-45 minutes after po   Small bites/sips; alternate liquid/solid with slow feeding rate   Oral care TID  Meds per pt preference          Outcome: Resolved/Met    [facility]    Patient: Rafael Cardona (19 y.o. female)  Date: 10/24/2019  Diagnosis: Syncope [R55]  COPD exacerbation (HCC) [J44.1]  Acute on chronic respiratory failure with hypoxia (HCC) [J96.21]  Flash pulmonary edema (HCC) [J81.0]  Elevated d-dimer [R79.89]  COPD with acute exacerbation (HCC) [J44.1]  Respiratory failure requiring intubation ([address] [J96.90]  Pulmonary edema cardiac cause (HCC) [I50.1] Acute on chronic respiratory failure with hypoxia (HCC)  Procedure(s) (LRB):  LEFT HEART CATH / CORONARY ANGIOGRAPHY (N/A)    Precautions: aspiration Skin  PLOF: As per H&P     ASSESSMENT:  Pt was seen at bedside for follow up dysphagia management. Pt tolerated soft solid and thin liquids with no overt s/sx of aspiration and positive oral clearance. She continues to present with increased mastication of solids secondary to limited natural dentition. Laryngeal elevation appeared functional/timely to palpation. Rec to continue soft solid diet with thin liquids, aspiration precautions, oral care TID, and meds as tolerated. No further skilled SLP services are indicated at this time as pt appears to be tolerating LRD without difficulty. Please re-consult if needed. Progression toward goals:  ?         Improving appropriately - goals met/approximated  ?          Not making progress/Not appropriate - will d/c POC     PLAN:  Recommendations and Planned Interventions:  Maximum therapeutic gains met; safest, least restrictive diet achieved. D/C ST intervention at this time. Discharge Recommendations:  None     SUBJECTIVE:   Patient stated Thanks for stopping by. OBJECTIVE:   Cognitive and Communication Status:  Neurologic State: Alert  Orientation Level: Oriented X4  Cognition: Follows commands  Perception: Appears intact  Perseveration: No perseveration noted  Safety/Judgement: Fall prevention  Dysphagia Treatment:  Oral Assessment:  Oral Assessment  Labial: No impairment  Dentition: Edentulous  Oral Hygiene: adequate  Lingual: No impairment  Velum: No impairment  Mandible: No impairment  P.O. Trials:   Patient Position: 60 at [facility]   Vocal quality prior to P.O.: No impairment   Consistency Presented: Thin liquid, Puree, Solid   How Presented: Self-fed/presented, Cup/sip, Spoon, Straw   Bolus Acceptance: No impairment   Bolus Formation/Control: Impaired   Type of Impairment: Delayed, Mastication   Propulsion: Delayed (# of seconds)   Oral Residue: None   Initiation of Swallow: No impairment   Laryngeal Elevation: Functional   Aspiration Signs/Symptoms: None   Pharyngeal Phase Characteristics: No impairment, issues, or problems    Effective Modifications: Small sips and bites   Cues for Modifications: Minimal       Oral Phase Severity: Mild   Pharyngeal Phase Severity : None    PAIN:  Start of Tx: 0  End of Tx: 0     After treatment:   ?              Patient left in no apparent distress sitting up in chair  ? Patient left in no apparent distress in bed  ? Call bell left within reach  ? Nursing notified  ? Caregiver present  ? Bed alarm activated    COMMUNICATION/EDUCATION:   ? Aspiration precautions; swallow safety; compensatory techniques  ?     Patient/family able to participate in training and education     Thank you for this referral.    Jeana Pride JOAN Saucedo. CCC-SLP/L  Speech-Language Pathologist

## 2019-10-24 NOTE — PROGRESS NOTES
TRANSFER - IN REPORT:    Verbal report received from [address] [place] [address] RN(name) on Anderson Waldo  being received from ICU(unit) for routine progression of care      Report consisted of patients Situation, Background, Assessment and   Recommendations(SBAR). Information from the following report(s) SBAR, Kardex, Recent Results, Med Rec Status, Cardiac Rhythm SR and Procedure Verification was reviewed with the receiving nurse. Opportunity for questions and clarification was provided. Assessment completed upon patients arrival to unit and care assumed.

## 2019-10-24 NOTE — PROGRESS NOTES
Problem: Falls - Risk of  Goal: *Absence of Falls  Description  Document Nabil Cunningham Fall Risk and appropriate interventions in the flowsheet.   Outcome: Progressing Towards Goal  Note:   Fall Risk Interventions:  Mobility Interventions: OT consult for ADLs, PT Consult for mobility concerns, PT Consult for assist device competence    Mentation Interventions: Bed/chair exit alarm    Medication Interventions: Bed/chair exit alarm    Elimination Interventions: Bed/chair exit alarm, Call light in reach    History of Falls Interventions: Bed/chair exit alarm

## 2019-10-24 NOTE — PROGRESS NOTES
[facility]      CARDIOLOGY PROGRESS NOTE  RECS:  1. Respiratory Failure s/p Mechanical Ventilation - now extubated   2. Syncope CT Head: No acute findings.   3. Coronary artery disease   · 8/28/19 NST: Left ventricular function post-stress was abnormal. Wall motion was abnormal at stress as described below in the wall scoring diagram. Calculated ejection fraction is 44%. The TID ratio is 0.94. Left ventricular perfusion is abnormal.   Perfusion defect 1: There is a left ventricular perfusion defect that is moderate in size with a severe reduction in uptake affecting the mid-apical anterior and apex location(s) that is non-reversible. There is abnormal wall motion in the defect area. Viability in the area is poor. The defect appears to be infarction. Perfusion defect was visually present without quantitative evidence. · s/p YADIRA to proximal LAD with RESOLUTE stent 5/20/2018.  Acute stent thrombosis shortly after initial PCI on 5/20/2018 requiring emergency intubation for pulmonary edema and repeat cardiac cath and additional PCI/YADIRA to prox LAD.   · Concern for angina with episodes of pulmonary edema and ongoing chest pain. Will plan for cardiac cath in AM to evaluate stents and r/o ischemia. Patient is amenable to plan. NPO after midnight. 4.  Chronic Heart failure with mild systolic dysfunction exacerbation            -improved very well clinically.   Repeat echo with EF 46%. No    recurrent dyspnea overnight.   Per nephrology - may use         Lasix PRN.     5.   Hx of Ischemic cardiomyopathy- with EF 35% on echo  5/21/2018  with    elevated pulmonary pressures at 44mm Hg- now   with mild systolic   Dysfunction Recent Echo 8/7/19 showed  EF% 45%-50%.    6.  Hypertension- stable  on Entresto and bb   7.  DM Type II- medications per medical team   8.  Dyslipidemia- on Lipitor 20 mg. LDL 80 on 8/19   9.  COPD exacerbation- managed by medical team   10. Hx of Tobacco abuse with   1 ppd X 50 years- patient quit in 2018  11. Obesity- weight loss advised  12. TESSA- uses CPAP at night   13. Urinary tract infection  14. CARA on CKD - renal function improving. Will proceed with [facility] to evaluate CAD      10/19/19 Echocardiogram:     · Left Ventricle: Mildly dilated left ventricle. Mild concentric hypertrophy. Mild-to-moderate systolic dysfunction. Calculated left ventricular ejection fraction is 46%. Biplane method used to measure ejection fraction. · Mitral Valve: Mitral valve thickening. Mitral annular calcification. Moderate mitral valve regurgitation is present. The mitral regurgitant jet is eccentric. · Interatrial Septum: Agitated saline contrast study was performed. No obvious septal defect. ASSESSMENT:  Hospital Problems  Date Reviewed: 10/11/2019          Codes Class Noted POA    Pulmonary edema cardiac cause ([facility] ICD-10-CM: I50.1  ICD-9-CM: 317  10/19/2019 Unknown        COPD with acute exacerbation ([facility] ICD-10-CM: J44.1  ICD-9-CM: 491.21  10/19/2019 Unknown        Respiratory failure requiring intubation ([facility] ICD-10-CM: J96.90  ICD-9-CM: 518.81  10/19/2019 Unknown        Syncope ICD-10-CM: R55  ICD-9-CM: 780.2  10/18/2019 Unknown        Flash pulmonary edema ([facility] ICD-10-CM: J81.0  ICD-9-CM: 518.4  10/18/2019 Unknown        Elevated d-dimer ICD-10-CM: R79.89  ICD-9-CM: 790.92  10/18/2019 Unknown        * (Principal) Acute on chronic respiratory failure with hypoxia (HCC) ICD-10-CM: J96.21  ICD-9-CM: 518.84, 799.02  10/18/2019 Unknown        COPD exacerbation ([facility] ICD-10-CM: J44.1  ICD-9-CM: 491.21  8/25/2019 Unknown                SUBJECTIVE:  C/O intermittent chest pain - worse with palpation  SOB improving    OBJECTIVE:    VS:   Visit Vitals  /56 (BP 1 Location: Left arm, BP Patient Position: At rest)   Pulse 74   Temp 98 °F (36.7 °C)   Resp 22   Ht 5' 4\" (1.626 m)   Wt 99.8 kg (220 lb 0.3 oz)   SpO2 100%   Breastfeeding?  No   BMI 37.77 kg/m²       No intake or output data in the 24 hours ending 10/24/19 0852  TELE: normal sinus rhythm    General: well developed and in no apparent distress  HENT: Normocephalic, atraumatic. Normal external eye. Neck :  JVD difficult to assess due to obesity  Cardiac:  regular rate and rhythm, S1, S2 normal, no murmur, click, rub or gallop  Lungs: clear to auscultation bilaterally, diminished breath sounds R base, L base  Abdomen: Soft, nontender, no masses  Extremities:  No c/c/e, peripheral pulses present      Labs: Results:       Chemistry Recent Labs     10/24/19  0438 10/23/19  0245 10/22/19  0330   GLU 94 107* 130*    138 139   K 3.9 4.1 4.1    106 106   CO2 26 27 26   BUN 45* 50* 49*   CREA 1.50* 1.57* 1.76*   CA 8.9 8.9 9.1   AGAP 7 5 7   BUCR 30* 32* 28*      CBC w/Diff Recent Labs     10/24/19  0438 10/23/19  0245 10/22/19  0330   WBC 8.8 7.8 9.2   RBC 3.86* 3.72* 3.86*   HGB 9.4* 9.0* 9.4*   HCT 31.3* 30.1* 31.7*   * 385 386   GRANS 55 65 78*   LYMPH 33 24 14*   EOS 2 0 0      Cardiac Enzymes No results for input(s): CPK, CKND1, GODFREY in the last 72 hours. No lab exists for component: CKRMB, TROIP   Coagulation No results for input(s): PTP, INR, APTT, INREXT in the last 72 hours. Lipid Panel Lab Results   Component Value Date/Time    Cholesterol, total 152 10/10/2019 12:00 AM    HDL Cholesterol 46 10/10/2019 12:00 AM    LDL, calculated 86 10/10/2019 12:00 AM    VLDL, calculated 20 10/10/2019 12:00 AM    Triglyceride 101 10/10/2019 12:00 AM    CHOL/HDL Ratio 5.6 (H) 05/21/2018 06:23 AM      BNP No results for input(s): BNPP in the last 72 hours. Liver Enzymes No results for input(s): TP, ALB, TBIL, AP, SGOT, GPT in the last 72 hours. No lab exists for component: DBIL   Thyroid Studies Lab Results   Component Value Date/Time    TSH 0.47 10/19/2019 08:40 PM              Polly Santoyo NP   Pager # [phone] supervised    I have independently evaluated and examined the patient.   All relevant labs and testing data's are reviewed. Care plan discussed and updated after review.     Purvi Gold MD

## 2019-10-24 NOTE — DIABETES MGMT
GLYCEMIC CONTROL AND NUTRITION    Assessment/Recommendations:  Fasting lab glucose this am 94 mg/dl   Continue corrective insulin coverage as needed  Will continue inpatient monitoring. Most recent blood glucose values:  Results for Kevin Cassette (MRN [id number]) as of 10/24/2019 10:33   Ref. Range 10/22/2019 21:47 10/23/2019 07:40 10/23/2019 11:04 10/23/2019 16:10 10/23/2019 22:25   GLUCOSE,FAST - POC Latest Ref Range: 70 - 110 mg/dL 152 (H) 114 (H) 148 (H) 207 (H) 170 (H)     Current A1C of 6.6 % is equivalent to average blood glucose of 143 mg/dl over the past 2-3 months. Current hospital diabetes medications:   Lispro corrective insulin coverage AC&HS  Previous day's insulin requirements:   Lispro 6 units corrective insulin  Home diabetes medications:  Verified with pt. Levemir 15 units daily  Humalog 2 units 3 times daily with meals  Diet:    Cardiac soft solids.  Consistent carb  Education:  ____Refer to Diabetes Education Record             __x_Education not indicated at this time      Erica Mason, [facility] CDE  Ext [phone]

## 2019-10-24 NOTE — PROGRESS NOTES
1235 TRANSFER - OUT REPORT:    Verbal report given to Robson(name) on Nirali Choudhary  being transferred to [address] [place] [address](unit) for routine progression of care       Report consisted of patients Situation, Background, Assessment and   Recommendations(SBAR). Information from the following report(s) SBAR, Kardex, Intake/Output and MAR was reviewed with the receiving nurse. Lines:   Peripheral IV 10/23/19 Anterior;Right Forearm (Active)   Site Assessment Clean, dry, & intact 10/24/2019  8:00 AM   Phlebitis Assessment 0 10/24/2019  8:00 AM   Infiltration Assessment 0 10/24/2019  8:00 AM   Dressing Status Clean, dry, & intact 10/24/2019  8:00 AM   Dressing Type Transparent 10/24/2019  8:00 AM   Hub Color/Line Status Capped 10/24/2019  8:00 AM   Action Taken Open ports on tubing capped 10/24/2019  8:00 AM   Alcohol Cap Used Yes 10/24/2019  8:00 AM        Opportunity for questions and clarification was provided.       Patient transported with:   [employer]

## 2019-10-24 NOTE — PROGRESS NOTES
New OT orders received and chart reviewed. Patient was evaluated on 10/21/19 and seen this am for therapy session. Pt has met her goals and is DC from OT caseload with recommendation [facility] for safety eval. New OT order will be acknowledged.  Thank you for the referral.     ADILIA Whitaker

## 2019-10-24 NOTE — PROGRESS NOTES
Problem: Mobility Impaired (Adult and Pediatric)  Goal: *Acute Goals and Plan of Care (Insert Text)  Description  Physical Therapy Goals  Initiated 10/21/2019 and to be accomplished within 7 day(s)  1. Patient will move from supine to sit and sit to supine  in bed with modified independence. 2.  Patient will transfer from bed to chair and chair to bed with modified independence using the least restrictive device. 3.  Patient will perform sit to stand with modified independent. 4.  Patient will ambulate with supervision for >200 feet with the least restrictive device. 5.  Patient will ascend/descend 12 stairs with 1 handrail(s) with minimal assistance/contact guard assist.    PLOF: pt reports ambulating with a cane primarily but also has a rollator. Lives in 2 story home. Outcome: Progressing Towards Goal   PHYSICAL THERAPY TREATMENT    Patient: Lupillo Tinsley (60 y.o. female)  Date: 10/24/2019  Diagnosis: Syncope [R55]  COPD exacerbation (HCC) [J44.1]  Acute on chronic respiratory failure with hypoxia (HCC) [J96.21]  Flash pulmonary edema (HCC) [J81.0]  Elevated d-dimer [R79.89]  COPD with acute exacerbation (HCC) [J44.1]  Respiratory failure requiring intubation ([facility] [facility] [J96.90]  Pulmonary edema cardiac cause ([facility] [address] [I50.1]   Precautions: Skin    ASSESSMENT:  Patient presents today alert and agreeable to therapy and was supine in bed upon arrival with NC o2 donned. Patient transferred to sitting EOB and stood to ambulate total 200ft with no AD at SBA. Patient demos minimal path deviations though denied dizziness or SOB during mobility. Patient continue to c/o chest pain but reports this is worse with coughing and does not worsen with walking; appears to be mechanical in nature. Patient then performed 3 steps using 8 inch step which patient reports is higher than her own steps at home. Patient required Stephanie and would benefit from practicing with 6 inch step.  At conclusion of session, patient transferred to sitting in locked recliner and handoff performed to nurse. Patient demos improvements in therapy and no longer in need of post acute rehab; would recommend [facility] PT.   Progression toward goals:   ?      Improving appropriately and progressing toward goals  ? Improving slowly and progressing toward goals  ? Not making progress toward goals and plan of care will be adjusted     PLAN:  Patient continues to benefit from skilled intervention to address the above impairments. Continue treatment per established plan of care. Discharge Recommendations:  Home Health  Further Equipment Recommendations for Discharge:  N/A     SUBJECTIVE:   Patient stated I feel better for the most part.     OBJECTIVE DATA SUMMARY:   Critical Behavior:  Neurologic State: Alert  Orientation Level: Oriented X4  Cognition: Follows commands  Safety/Judgement: Fall prevention  Functional Mobility Training:  Bed Mobility:  Rolling: Modified independent  Supine to Sit: Modified independent   Transfers:  Sit to Stand: Modified independent  Stand to Sit: Modified independent   Balance:  Sitting: Intact  Standing: Impaired  Standing - Static: Good  Standing - Dynamic : Fair(+)   Ambulation/Gait Training:  Distance (ft): 200 Feet (ft)   Ambulation - Level of Assistance: Stand-by assistance   Gait Abnormalities: Decreased step clearance    Speed/Venus: Slow   Interventions: Tactile cues; Verbal cues; Visual/Demos   Stairs:  Number of Stairs Trained: 3(8 inch step) (needs practice with 6in step)  Stairs - Level of Assistance: Minimum assistance  Education:  ?         Bed mobility  ? Transfers  ? Ambulation  ? Assistive device management  ? Stairs  ? Body mechanics  ? Position change  ? Activity pacing/energy conservation  ?          Other:   Pain:  Pain level pre-treatment: 0/10  Pain level post-treatment: 0/10     Activity Tolerance:   Patient tolerated activity well and is improving in all mobility. Please refer to the flowsheet for vital signs taken during this treatment. After treatment:   ? Patient left in no apparent distress sitting up in chair  ? Patient left in no apparent distress in bed  ? Call bell left within reach  ? Nursing notified  ? Caregiver present  ? Bed alarm activated  ? SCDs applied      COMMUNICATION/EDUCATION:   ?         Role of Physical Therapy in the acute care setting. ?         Fall prevention education was provided and the patient/caregiver indicated understanding. ? Patient/family have participated as able in working toward goals and plan of care. ?         Patient/family agree to work toward stated goals and plan of care. ?         Patient understands intent and goals of therapy, but is neutral about his/her participation. ? Patient is unable to participate in stated goals/plan of care: ongoing with therapy staff.   ?         Other:        Wale Viera PT   Time Calculation: 24 mins

## 2019-10-24 NOTE — PROGRESS NOTES
Received pt from ICU via w/c to room 213. Assisted into bed. Alert and oriented x 4. Denies distress at this time. Call bell at side. 1600 up and about to BR independently. C/o continuous pain in mid/right chest. Dr Gan Fearing notified. Will review . 1737 medicated with ultram as ordered for c/o CP. Pt sts its the same pain she has been having since she came in and unchanged. .    2196 talking with visitors at bedside. 1940 Bedside and Verbal shift change report given to Emily Hernandez (oncoming nurse) by Que Benavidez RN (offgoing nurse). Report given with ROMANA, Carey and MAR.

## 2019-10-24 NOTE — PROGRESS NOTES
Referral called to [facility] with ARU to review for possible admission.  Yousif Bae, [phone]

## 2019-10-25 LAB
ANION GAP SERPL CALC-SCNC: 6 MMOL/L (ref 3–18)
BACTERIA SPEC CULT: NORMAL
BACTERIA SPEC CULT: NORMAL
BASOPHILS # BLD: 0 K/UL (ref 0–0.1)
BASOPHILS NFR BLD: 0 % (ref 0–2)
BUN SERPL-MCNC: 40 MG/DL (ref 7–18)
BUN/CREAT SERPL: 25 (ref 12–20)
CALCIUM SERPL-MCNC: 8.5 MG/DL (ref 8.5–10.1)
CHLORIDE SERPL-SCNC: 105 MMOL/L (ref 100–111)
CO2 SERPL-SCNC: 27 MMOL/L (ref 21–32)
CREAT SERPL-MCNC: 1.59 MG/DL (ref 0.6–1.3)
DIFFERENTIAL METHOD BLD: ABNORMAL
END DIASTOLIC PRESSURE: 26
EOSINOPHIL # BLD: 0.2 K/UL (ref 0–0.4)
EOSINOPHIL NFR BLD: 2 % (ref 0–5)
ERYTHROCYTE [DISTWIDTH] IN BLOOD BY AUTOMATED COUNT: 18.5 % (ref 11.6–14.5)
GLUCOSE BLD STRIP.AUTO-MCNC: 147 MG/DL (ref 70–110)
GLUCOSE BLD STRIP.AUTO-MCNC: 99 MG/DL (ref 70–110)
GLUCOSE SERPL-MCNC: 103 MG/DL (ref 74–99)
HCT VFR BLD AUTO: 31.6 % (ref 35–45)
HGB BLD-MCNC: 9.4 G/DL (ref 12–16)
LYMPHOCYTES # BLD: 3 K/UL (ref 0.9–3.6)
LYMPHOCYTES NFR BLD: 36 % (ref 21–52)
MAGNESIUM SERPL-MCNC: 2.1 MG/DL (ref 1.6–2.6)
MCH RBC QN AUTO: 24.3 PG (ref 24–34)
MCHC RBC AUTO-ENTMCNC: 29.7 G/DL (ref 31–37)
MCV RBC AUTO: 81.7 FL (ref 74–97)
MONOCYTES # BLD: 0.9 K/UL (ref 0.05–1.2)
MONOCYTES NFR BLD: 11 % (ref 3–10)
NEUTS SEG # BLD: 4.3 K/UL (ref 1.8–8)
NEUTS SEG NFR BLD: 51 % (ref 40–73)
PLATELET # BLD AUTO: 482 K/UL (ref 135–420)
PMV BLD AUTO: 9.4 FL (ref 9.2–11.8)
POTASSIUM SERPL-SCNC: 4.2 MMOL/L (ref 3.5–5.5)
RBC # BLD AUTO: 3.87 M/UL (ref 4.2–5.3)
SERVICE CMNT-IMP: NORMAL
SERVICE CMNT-IMP: NORMAL
SODIUM SERPL-SCNC: 138 MMOL/L (ref 136–145)
WBC # BLD AUTO: 8.4 K/UL (ref 4.6–13.2)

## 2019-10-25 PROCEDURE — 74011250636 HC RX REV CODE- 250/636: Performed by: INTERNAL MEDICINE

## 2019-10-25 PROCEDURE — 83735 ASSAY OF MAGNESIUM: CPT

## 2019-10-25 PROCEDURE — 74011250637 HC RX REV CODE- 250/637: Performed by: INTERNAL MEDICINE

## 2019-10-25 PROCEDURE — C1894 INTRO/SHEATH, NON-LASER: HCPCS | Performed by: INTERNAL MEDICINE

## 2019-10-25 PROCEDURE — 4A023N7 MEASUREMENT OF CARDIAC SAMPLING AND PRESSURE, LEFT HEART, PERCUTANEOUS APPROACH: ICD-10-PCS | Performed by: INTERNAL MEDICINE

## 2019-10-25 PROCEDURE — 74011250636 HC RX REV CODE- 250/636: Performed by: FAMILY MEDICINE

## 2019-10-25 PROCEDURE — 74011636320 HC RX REV CODE- 636/320: Performed by: INTERNAL MEDICINE

## 2019-10-25 PROCEDURE — 74011250636 HC RX REV CODE- 250/636: Performed by: PHYSICIAN ASSISTANT

## 2019-10-25 PROCEDURE — 77030015766: Performed by: INTERNAL MEDICINE

## 2019-10-25 PROCEDURE — 94761 N-INVAS EAR/PLS OXIMETRY MLT: CPT

## 2019-10-25 PROCEDURE — 80048 BASIC METABOLIC PNL TOTAL CA: CPT

## 2019-10-25 PROCEDURE — 82962 GLUCOSE BLOOD TEST: CPT

## 2019-10-25 PROCEDURE — 90686 IIV4 VACC NO PRSV 0.5 ML IM: CPT | Performed by: FAMILY MEDICINE

## 2019-10-25 PROCEDURE — 74011000250 HC RX REV CODE- 250: Performed by: PHYSICIAN ASSISTANT

## 2019-10-25 PROCEDURE — 99153 MOD SED SAME PHYS/QHP EA: CPT | Performed by: INTERNAL MEDICINE

## 2019-10-25 PROCEDURE — 90471 IMMUNIZATION ADMIN: CPT

## 2019-10-25 PROCEDURE — B2111ZZ FLUOROSCOPY OF MULTIPLE CORONARY ARTERIES USING LOW OSMOLAR CONTRAST: ICD-10-PCS | Performed by: INTERNAL MEDICINE

## 2019-10-25 PROCEDURE — 94640 AIRWAY INHALATION TREATMENT: CPT

## 2019-10-25 PROCEDURE — 74011000250 HC RX REV CODE- 250: Performed by: INTERNAL MEDICINE

## 2019-10-25 PROCEDURE — 65660000000 HC RM CCU STEPDOWN

## 2019-10-25 PROCEDURE — B2151ZZ FLUOROSCOPY OF LEFT HEART USING LOW OSMOLAR CONTRAST: ICD-10-PCS | Performed by: INTERNAL MEDICINE

## 2019-10-25 PROCEDURE — 99152 MOD SED SAME PHYS/QHP 5/>YRS: CPT | Performed by: INTERNAL MEDICINE

## 2019-10-25 PROCEDURE — 85025 COMPLETE CBC W/AUTO DIFF WBC: CPT

## 2019-10-25 PROCEDURE — 77030013797 HC KT TRNSDUC PRSSR EDWD -A: Performed by: INTERNAL MEDICINE

## 2019-10-25 PROCEDURE — 93458 L HRT ARTERY/VENTRICLE ANGIO: CPT | Performed by: INTERNAL MEDICINE

## 2019-10-25 PROCEDURE — 74011250637 HC RX REV CODE- 250/637: Performed by: NURSE PRACTITIONER

## 2019-10-25 PROCEDURE — 36415 COLL VENOUS BLD VENIPUNCTURE: CPT

## 2019-10-25 PROCEDURE — 74011250637 HC RX REV CODE- 250/637: Performed by: STUDENT IN AN ORGANIZED HEALTH CARE EDUCATION/TRAINING PROGRAM

## 2019-10-25 RX ORDER — SODIUM CHLORIDE 9 MG/ML
50 INJECTION, SOLUTION INTRAVENOUS CONTINUOUS
Status: DISCONTINUED | OUTPATIENT
Start: 2019-10-25 | End: 2019-10-26

## 2019-10-25 RX ORDER — SODIUM CHLORIDE 0.9 % (FLUSH) 0.9 %
5-40 SYRINGE (ML) INJECTION AS NEEDED
Status: DISCONTINUED | OUTPATIENT
Start: 2019-10-25 | End: 2019-10-27

## 2019-10-25 RX ORDER — VERAPAMIL HYDROCHLORIDE 2.5 MG/ML
INJECTION, SOLUTION INTRAVENOUS AS NEEDED
Status: DISCONTINUED | OUTPATIENT
Start: 2019-10-25 | End: 2019-10-25 | Stop reason: HOSPADM

## 2019-10-25 RX ORDER — FENTANYL CITRATE 50 UG/ML
INJECTION, SOLUTION INTRAMUSCULAR; INTRAVENOUS AS NEEDED
Status: DISCONTINUED | OUTPATIENT
Start: 2019-10-25 | End: 2019-10-25 | Stop reason: HOSPADM

## 2019-10-25 RX ORDER — MIDAZOLAM HYDROCHLORIDE 1 MG/ML
INJECTION, SOLUTION INTRAMUSCULAR; INTRAVENOUS AS NEEDED
Status: DISCONTINUED | OUTPATIENT
Start: 2019-10-25 | End: 2019-10-25 | Stop reason: HOSPADM

## 2019-10-25 RX ORDER — HEPARIN SODIUM 1000 [USP'U]/ML
INJECTION, SOLUTION INTRAVENOUS; SUBCUTANEOUS AS NEEDED
Status: DISCONTINUED | OUTPATIENT
Start: 2019-10-25 | End: 2019-10-25 | Stop reason: HOSPADM

## 2019-10-25 RX ORDER — SODIUM CHLORIDE 0.9 % (FLUSH) 0.9 %
5-40 SYRINGE (ML) INJECTION EVERY 8 HOURS
Status: DISCONTINUED | OUTPATIENT
Start: 2019-10-25 | End: 2019-10-27

## 2019-10-25 RX ORDER — LIDOCAINE HYDROCHLORIDE 10 MG/ML
INJECTION, SOLUTION EPIDURAL; INFILTRATION; INTRACAUDAL; PERINEURAL AS NEEDED
Status: DISCONTINUED | OUTPATIENT
Start: 2019-10-25 | End: 2019-10-25 | Stop reason: HOSPADM

## 2019-10-25 RX ADMIN — BUMETANIDE 1 MG: 1 TABLET ORAL at 14:19

## 2019-10-25 RX ADMIN — MELATONIN TAB 5 MG 5 MG: 5 TAB at 21:55

## 2019-10-25 RX ADMIN — TRAMADOL HYDROCHLORIDE 50 MG: 50 TABLET ORAL at 14:20

## 2019-10-25 RX ADMIN — ATORVASTATIN CALCIUM 20 MG: 20 TABLET, FILM COATED ORAL at 21:55

## 2019-10-25 RX ADMIN — HEPARIN SODIUM 5000 UNITS: 5000 INJECTION INTRAVENOUS; SUBCUTANEOUS at 17:30

## 2019-10-25 RX ADMIN — Medication 10 ML: at 21:59

## 2019-10-25 RX ADMIN — SUCRALFATE 1 G: 1 TABLET ORAL at 21:55

## 2019-10-25 RX ADMIN — Medication 10 ML: at 05:57

## 2019-10-25 RX ADMIN — Medication 10 ML: at 15:05

## 2019-10-25 RX ADMIN — CARVEDILOL 6.25 MG: 12.5 TABLET, FILM COATED ORAL at 18:56

## 2019-10-25 RX ADMIN — SUCRALFATE 1 G: 1 TABLET ORAL at 14:19

## 2019-10-25 RX ADMIN — IPRATROPIUM BROMIDE AND ALBUTEROL SULFATE 3 ML: .5; 3 SOLUTION RESPIRATORY (INHALATION) at 13:55

## 2019-10-25 RX ADMIN — BUDESONIDE 500 MCG: 0.5 INHALANT RESPIRATORY (INHALATION) at 19:56

## 2019-10-25 RX ADMIN — Medication 81 MG: at 08:00

## 2019-10-25 RX ADMIN — TIZANIDINE 2 MG: 2 TABLET ORAL at 20:19

## 2019-10-25 RX ADMIN — SUCRALFATE 1 G: 1 TABLET ORAL at 18:56

## 2019-10-25 RX ADMIN — INFLUENZA VIRUS VACCINE 0.5 ML: 15; 15; 15; 15 SUSPENSION INTRAMUSCULAR at 14:36

## 2019-10-25 RX ADMIN — IPRATROPIUM BROMIDE AND ALBUTEROL SULFATE 3 ML: .5; 3 SOLUTION RESPIRATORY (INHALATION) at 07:55

## 2019-10-25 RX ADMIN — SODIUM CHLORIDE 50 ML/HR: 900 INJECTION, SOLUTION INTRAVENOUS at 14:30

## 2019-10-25 RX ADMIN — PANTOPRAZOLE SODIUM 40 MG: 40 TABLET, DELAYED RELEASE ORAL at 18:56

## 2019-10-25 RX ADMIN — TRAMADOL HYDROCHLORIDE 50 MG: 50 TABLET ORAL at 20:19

## 2019-10-25 RX ADMIN — IPRATROPIUM BROMIDE AND ALBUTEROL SULFATE 3 ML: .5; 3 SOLUTION RESPIRATORY (INHALATION) at 19:56

## 2019-10-25 RX ADMIN — BUDESONIDE 500 MCG: 0.5 INHALANT RESPIRATORY (INHALATION) at 07:55

## 2019-10-25 RX ADMIN — IPRATROPIUM BROMIDE AND ALBUTEROL SULFATE 3 ML: .5; 3 SOLUTION RESPIRATORY (INHALATION) at 03:44

## 2019-10-25 NOTE — PROGRESS NOTES
TRANSFER - IN REPORT:    Verbal report received from [facility] on John Car  being received from Cath lab for routine post - op      Report consisted of patients Situation, Background, Assessment and   Recommendations(SBAR). Information from the following report(s) Procedure Summary was reviewed with the receiving nurse. Opportunity for questions and clarification was provided. Assessment completed upon patients arrival to unit and care assumed. Pt c/o of chest pain stabbing pain in middle of chest states has had this pain consistently since Monday Oct 21,st when they extubated her. MD aware and no new orders given. Pt received pain medications in lab pain is 6/10 at this time.     2109 2cc air removed from TR band on Right radial wrist no sign of bleeding or hematoma site clean dry intact    0938 attempted to remove 2cc air noted oozing at site replaced 2cc air    1000 attempted to remove 2cc air again and site began to ooze again replaced 2cc air and added 2cc air     1015 2cc air removed no sign bleeding will cont to monitor    1030 2cc air removed no sign bleeding will cont to monitor    1100 2 cc air removed bleeding noted air replaced    1115 4cc air removed no bleeding noted     1132 removed TR band no sign bleeding noted site without hematoma quickclot applied with 4x4 tegaderm and coban dressing    1140 PA for Dr Rose Marie Sandy at bedside assessed pt no new orders aware pt is having continued chest pain no orders given    1145 report given to [address] [place] [address] on 2500 [facility] will transport pt via transportation    1210 pt transferred to [facility]

## 2019-10-25 NOTE — ROUTINE PROCESS
Bedside and Verbal shift change report given to Vince Magallanes RN (oncoming nurse) by Mariah Champion RN   (offgoing nurse). Report included the following information SBAR, Kardex, Intake/Output and MAR.

## 2019-10-25 NOTE — PROGRESS NOTES
Received report on pt.from off going RN. Resting quietly in bed on rounds. Denies c/o pain or SOB at this time. No acute distress noted. Call bell at side. NPO for cardiac cath. Will cont to monitor for any changes in status. 0820 pt was taken to cath lab with tele box still on. Cath lab called and notified that they still had the tele box on the pt and stated they would make sure it was returned when the pt was returned. 1230 returned to room. Dressing to rt radial cath site dry and intact. Good color Motion and sensation. Denies pain or numbness at site. Reminded of use restrictions to rt wrist. Still c/o pain in chest same as since extubation. MD;s aware. 1330 sitting up in bed eating lunch. Assessment unchanged. No bleeding or hematoma at rt radial cath site. 1500 up and about ambulating from bed to BR wo difficulty. 1925 Bedside and Verbal shift change report given to Leann Ramos RN (oncoming nurse) by Deric Pruitt RN (offgoing nurse). Report given with Carey AVENDANO and MAR.

## 2019-10-25 NOTE — PROGRESS NOTES
[facility]  Pulmonary, Critical Care, and Sleep Medicine    Name: Jil Johnson MRN: [id number]   : 1950 Hospital: [address] [place] [address]   Date: 10/25/2019        Follow-up pulmonary consult note    Consulting physician: [facility] CARIDAD  Reason for consultation: Acute hypoxic respiratory failure, status post extubation      IMPRESSION:   · Acute hypoxic resp failure, likely multifactorial due to acute combined CHF exacerbation, PNA, probably less likely COPD given PFTs in past with restrictive pattern  · Intermittent hypertension and tachycardia  · Acute encephalopathy, etiology likely secondary to ICU delirium -- resolved. · Multifocal patchy opacities, in the setting of pulmonary edema, may represent pneumonia: Improving  · Non-cardiac, reproducible MSK chest pain -- likely costochondritis, cardiac workup unremarkable  · Acute on chronic combined CHF with moderate to severe MR  · CARA on CKD3-4  · Other co-morbids: CAD, DM2, HTN, HLD, obesity, former smoker  · Obesity: Body mass index is 37.77 kg/m².   ·      Patient Active Problem List   Diagnosis Code    Cardiomyopathy, dilated ([facility] [address] I42.0    Environmental allergies Z91.09    Diabetes mellitus type 2, insulin dependent ([facility] [address] E11.9, Z79.4    COPD (chronic obstructive pulmonary disease) ([facility]) J44.9    Essential hypertension I10    Iron deficiency anemia D50.9    Decreased GFR R94.4    Dyslipidemia, goal LDL below 70 E78.5    Former smoker Z87.891    Osteoarthritis of both knees M17.0    Chronic pain of both knees M25.561, M25.562, G89.29    Chronic pain syndrome G89.4    Primary osteoarthritis of both knees M17.0    Chronic obstructive pulmonary disease (HCC) J44.9    Compliance with medication regimen Z91.89    Bilateral shoulder pain M25.511, M25.512    Elevated alkaline phosphatase level R74.8    Caregiver stress Z63.6    STEMI (ST elevation myocardial infarction) ([facility]) I21.3    Acute pulmonary edema ([facility]) J81.0    Coronary artery disease involving native coronary artery of native heart without angina pectoris I25.10    Pulmonary edema J81.1    Chest pain R07.9    SOB (shortness of breath) R06.02    Coronary artery disease involving native coronary artery with unstable angina pectoris (HCC) I25.110    Type 2 diabetes with nephropathy ([facility]) E11.21    Severe obesity (BMI 35.0-39. 9) with comorbidity ([facility] [address] E66.01    COPD exacerbation ([facility] [address] J44.1    CAP (community acquired pneumonia) J18.9    Combined systolic and diastolic congestive heart failure (HCC) I50.40    Type 2 diabetes mellitus with hyperglycemia (HCC) E11.65    TESSA on CPAP G47.33, Z99.89    Ischemic cardiomyopathy I25.5    Syncope R55    Flash pulmonary edema (HCC) J81.0    Elevated d-dimer R79.89    Acute on chronic respiratory failure with hypoxia (HCC) J96.21    Pulmonary edema cardiac cause ([facility]) I50.1    COPD with acute exacerbation (HCC) J44.1    Respiratory failure requiring intubation ([facility]) J96.90    CAD (coronary artery disease) I25.10        RECOMMENDATIONS:   Maintain net negative fluid balance as renal function tolerates. Diuresis per primary service, Cardiology, Nephrology  Management of CAD per cardiology. Planning cardiac cath tomorrow. Bipap QHS and PRN  Schedule bronchodilators: duonebs q6h, pulmicort nebs 1mg BID, and albuterol PRN. Restart home anoro ellipta and PRN albuterol on discharge  Agree with discontinuing steroids  ABX per primary service for CAP  --total duration 7 days  Please hold home bronchodilators. May resume on discharge  Supplemental oxygen to maintain SpO2 >88%  Please assess for home oxygen need prior to discharge  Aggressive pulmonary toileting/bronchial hygiene  Frequent incentive spirometry  Aspiration precautions including elevating HOB >30deg  PT/OT, OOB, ambulate with assistance as tolerated  DVT ppx per primary service      We will sign off at this time.   Advised patient to follow-up to pulmonary clinic in 3 to 6 weeks. Subjective/History:   10/25/19  Patient seen and examined at bedside. No acute events overnight. Patient reports her breathing is doing better. Denies nausea, vomiting, diarrhea, chest pain. HPI:  Patient is a 81 y.o. female with PMH Significant for CAD, CMO, DM, HTN, Renal Insufficiency, HLD, COPD, & Former smoker whom presented to [facility] ED this evening with C/o SOB x 1 week. Per notes, she stated that her shortness of breath would cause her to become dizzy & 2 days prior she actually had an syncopal episode. Denies any head trauma. She stated she has been compliant with her COPD inhaler & this would result in intermittent relief of her shortness of breath. She denied any CP. Work-up in ED included CT imaging of head, PCXR, & labs. Labs significant for elevated D-dimer (1.09), Cr 1.49, BNP 1645. Head CT negative. PCXR with No PTX & Volume overloaded appearance with Sm. B/L Pleural effusion's (> R). While in ED, patient used the commode & after developed shortness of breath. Placed on N/c with improvement in 02 sat's & decreased shortness of breath. While patient was receiving Steroids (for AECOPD S/Sx's), patient developed respiratory distress resulting in arrest.  Patient was intubated  & [facility] consulted for ICU Admission & MV Mgmt. In addition, CTA Chest performed to R/o PE.  CTA Confirmed no PE, however B/L Lung consolidation c/w PNA with element of fluid overload. Based on CTA results, Pulmonary edema & pulmonary infiltrates identified by Dr. Donna Zelaya. D/w [facility] ED Provider & lasix IVP given. Upon arrival to ICU, patient sedated & intubated. Will obtain BC's & Sputum Cx's. Will begin AECOPD regimen, to include ABX, steroids, & Nebs. Will check serial CE's & BNP daily. The patient is critically ill and can not provide additional history due to Ventilated.          Current Facility-Administered Medications   Medication Dose Route Frequency    atorvastatin (LIPITOR) tablet 20 mg  20 mg Oral QHS    melatonin tablet 5 mg  5 mg Oral QHS    pantoprazole (PROTONIX) tablet 40 mg  40 mg Oral ACB&D    sucralfate (CARAFATE) tablet 1 g  1 g Oral AC&HS    bumetanide (BUMEX) tablet 1 mg  1 mg Oral DAILY    albuterol-ipratropium (DUO-NEB) 2.5 MG-0.5 MG/3 ML  3 mL Nebulization Q6H RT    lidocaine 4 % patch 1 Patch  1 Patch TransDERmal Q24H    diclofenac (VOLTAREN) 1 % topical gel 2 g  2 g Topical QID    influenza vaccine - (6 mos+)(PF) (FLUARIX/FLULAVAL/FLUZONE QUAD) injection 0.5 mL  0.5 mL IntraMUSCular PRIOR TO DISCHARGE    aspirin chewable tablet 81 mg  81 mg Oral DAILY    carvedilol (COREG) tablet 6.25 mg  6.25 mg Oral BID WITH MEALS    insulin lispro (HUMALOG) injection   SubCUTAneous AC&HS    heparin (porcine) injection 5,000 Units  5,000 Units SubCUTAneous Q8H    sodium chloride (NS) flush 5-40 mL  5-40 mL IntraVENous Q8H    budesonide (PULMICORT) 500 mcg/2 ml nebulizer suspension  500 mcg Nebulization BID RT    multivit-folic acid-herbal 263 (WELLESSE PLUS) oral liquid 30 mL  30 mL Per NG tube DAILY     Review of Systems:  A comprehensive review of systems was negative except for that written in the HPI. Objective:   Vital Signs:    Visit Vitals  /72 (BP 1 Location: Right arm, BP Patient Position: At rest;Lying left side)   Pulse 86   Temp 98.6 °F (37 °C)   Resp 18   Ht 5' 4\" (1.626 m)   Wt 99.8 kg (220 lb 0.3 oz)   SpO2 95%   Breastfeeding? No   BMI 37.77 kg/m²       O2 Device: Room air   O2 Flow Rate (L/min): 2 l/min   Temp (24hrs), Av.2 °F (36.8 °C), Min:97.8 °F (36.6 °C), Max:98.7 °F (37.1 °C)       Intake/Output:   Last shift:      No intake/output data recorded.   Last 3 shifts: 10/23 0701 - 10/24 1900  In: 240 [P.O.:240]  Out: 1000 [Urine:1000]    Intake/Output Summary (Last 24 hours) at 10/25/2019 0040  Last data filed at 10/24/2019 0800  Gross per 24 hour   Intake 240 ml   Output --   Net 240 ml     Physical Exam:     General: Alert and oriented, comfortable on nasal cannula, sitting upright on side of bed, eating dinner   Head:  Normocephalic, without obvious abnormality, atraumatic. Eyes:  Conjunctivae/corneas clear. PERRL, EOMI, sclera anicteric   Nose: Nares normal. Septum midline. Mucosa normal. No drainage or sinus tenderness. Neck: Supple, symmetrical, trachea midline, no adenopathy, no carotid bruit and no JVD. Lungs:    Unchanged---symmetrical chest rise. CTABl, decreased bases without wheezes/rales/rhonchi   Heart:  RRR, S1, S2 normal, no m/r/g, +reproducible chest pain   Abdomen:   Soft, obese, non-tender. Bowel sounds hypoactive. No masses,  No organomegaly. Extremities: Extremities normal, atraumatic, no cyanosis or clubbing. Trace B/L LE Edema   Pulses: 2+ and symmetric all extremities. Skin: Skin color, texture, turgor normal. No rashes or lesions   Neurologic: Alert and appropriate, oriented x3, grossly nonfocal, strength and coordination grossly intact throughout all extremities, no focal deficits         Data:     Recent Results (from the past 24 hour(s))   CBC WITH AUTOMATED DIFF    Collection Time: 10/24/19  4:38 AM   Result Value Ref Range    WBC 8.8 4.6 - 13.2 K/uL    RBC 3.86 (L) 4.20 - 5.30 M/uL    HGB 9.4 (L) 12.0 - 16.0 g/dL    HCT 31.3 (L) 35.0 - 45.0 %    MCV 81.1 74.0 - 97.0 FL    MCH 24.4 24.0 - 34.0 PG    MCHC 30.0 (L) 31.0 - 37.0 g/dL    RDW 18.2 (H) 11.6 - 14.5 %    PLATELET 449 (H) 796 - 420 K/uL    MPV 10.1 9.2 - 11.8 FL    NEUTROPHILS 55 40 - 73 %    LYMPHOCYTES 33 21 - 52 %    MONOCYTES 10 3 - 10 %    EOSINOPHILS 2 0 - 5 %    BASOPHILS 0 0 - 2 %    ABS. NEUTROPHILS 4.9 1.8 - 8.0 K/UL    ABS. LYMPHOCYTES 2.9 0.9 - 3.6 K/UL    ABS. MONOCYTES 0.9 0.05 - 1.2 K/UL    ABS. EOSINOPHILS 0.1 0.0 - 0.4 K/UL    ABS.  BASOPHILS 0.0 0.0 - 0.1 K/UL    DF AUTOMATED     METABOLIC PANEL, BASIC    Collection Time: 10/24/19  4:38 AM   Result Value Ref Range    Sodium 137 136 - 145 mmol/L    Potassium 3.9 3.5 - 5.5 mmol/L    Chloride 104 100 - 111 mmol/L    CO2 26 21 - 32 mmol/L    Anion gap 7 3.0 - 18 mmol/L    Glucose 94 74 - 99 mg/dL    BUN 45 (H) 7.0 - 18 MG/DL    Creatinine 1.50 (H) 0.6 - 1.3 MG/DL    BUN/Creatinine ratio 30 (H) 12 - 20      GFR est AA 42 (L) >60 ml/min/1.73m2    GFR est non-AA 35 (L) >60 ml/min/1.73m2    Calcium 8.9 8.5 - 10.1 MG/DL   MAGNESIUM    Collection Time: 10/24/19  4:38 AM   Result Value Ref Range    Magnesium 2.1 1.6 - 2.6 mg/dL   GLUCOSE, POC    Collection Time: 10/24/19 11:05 AM   Result Value Ref Range    Glucose (POC) 272 (H) 70 - 110 mg/dL   GLUCOSE, POC    Collection Time: 10/24/19  1:26 PM   Result Value Ref Range    Glucose (POC) 180 (H) 70 - 110 mg/dL   GLUCOSE, POC    Collection Time: 10/24/19  3:35 PM   Result Value Ref Range    Glucose (POC) 148 (H) 70 - 110 mg/dL   GLUCOSE, POC    Collection Time: 10/24/19 11:19 PM   Result Value Ref Range    Glucose (POC) 133 (H) 70 - 110 mg/dL           Recent Labs     10/23/19  0446   FIO2I 21   HCO3I 26.4*   PCO2I 42.0   PHI 7.407   PO2I 56*       Telemetry:normal sinus rhythm    Imaging:  I have personally reviewed the patients radiographs and have reviewed the reports.   No new studies        Ryan Mojica MD/MPH     Pulmonary, Critical Care Medicine  [facility]

## 2019-10-25 NOTE — PROGRESS NOTES
[facility]      CARDIOLOGY PROGRESS NOTE  RECS:  1. Respiratory Failure s/p Mechanical Ventilation - now extubated   2. Syncope CT Head: No acute findings.   3. Coronary artery disease   · 8/28/19 NST: Left ventricular function post-stress was abnormal. Wall motion was abnormal at stress as described below in the wall scoring diagram. Calculated ejection fraction is 44%. The TID ratio is 0.94. Left ventricular perfusion is abnormal.   Perfusion defect 1: There is a left ventricular perfusion defect that is moderate in size with a severe reduction in uptake affecting the mid-apical anterior and apex location(s) that is non-reversible. There is abnormal wall motion in the defect area. Viability in the area is poor. The defect appears to be infarction. Perfusion defect was visually present without quantitative evidence. · s/p YADIRA to proximal LAD with RESOLUTE stent 5/20/2018.  Acute stent thrombosis shortly after initial PCI on 5/20/2018 requiring emergency intubation for pulmonary edema and repeat cardiac cath and additional PCI/YADIRA to prox LAD.   Concern for angina with episodes of pulmonary edema and ongoing chest pain. 4.  Chronic Heart failure with mild systolic dysfunction exacerbation            -improved very well clinically.   Repeat echo with EF 46%. No    recurrent dyspnea overnight.   Per nephrology - may use         Lasix PRN.     5.   Hx of Ischemic cardiomyopathy- with EF 35% on echo  5/21/2018  with    elevated pulmonary pressures at 44mm Hg- now   with mild systolic   Dysfunction Recent Echo 8/7/19 showed  EF% 45%-50%.    6.  Hypertension- stable  on Entresto and bb   7.  DM Type II- medications per medical team   8.  Dyslipidemia- on Lipitor 20 mg. LDL 80 on 8/19   9.  COPD exacerbation- managed by medical team   10. Hx of Tobacco abuse with   1 ppd X 50 years- patient quit in 2018  11. Obesity- weight loss advised  12. TESSA- uses CPAP at night   13. Urinary tract infection  14. CARA on CKD - renal function improving. Will proceed with [facility] to evaluate CAD. Gentle hydration      10/19/19 Echocardiogram:     · Left Ventricle: Mildly dilated left ventricle. Mild concentric hypertrophy. Mild-to-moderate systolic dysfunction. Calculated left ventricular ejection fraction is 46%. Biplane method used to measure ejection fraction. · Mitral Valve: Mitral valve thickening. Mitral annular calcification. Moderate mitral valve regurgitation is present. The mitral regurgitant jet is eccentric. · Interatrial Septum: Agitated saline contrast study was performed. No obvious septal defect.          ASSESSMENT:  Hospital Problems  Date Reviewed: 10/11/2019          Codes Class Noted POA    Pulmonary edema cardiac cause ([facility] ICD-10-CM: I50.1  ICD-9-CM: 679  10/19/2019 Unknown        COPD with acute exacerbation ([facility] ICD-10-CM: J44.1  ICD-9-CM: 491.21  10/19/2019 Unknown        Respiratory failure requiring intubation ([facility] ICD-10-CM: J96.90  ICD-9-CM: 518.81  10/19/2019 Unknown        Syncope ICD-10-CM: R55  ICD-9-CM: 780.2  10/18/2019 Unknown        Flash pulmonary edema ([facility] ICD-10-CM: J81.0  ICD-9-CM: 518.4  10/18/2019 Unknown        Elevated d-dimer ICD-10-CM: R79.89  ICD-9-CM: 790.92  10/18/2019 Unknown        * (Principal) Acute on chronic respiratory failure with hypoxia (HCC) ICD-10-CM: J96.21  ICD-9-CM: 518.84, 799.02  10/18/2019 Unknown        CAD (coronary artery disease) ICD-10-CM: I25.10  ICD-9-CM: 414.00  10/18/2019     Overview Signed 10/24/2019 10:43 AM by Cal Cancino RN     Added automatically from request for surgery [id number]             COPD exacerbation [facility]) ICD-10-CM: J44.1  ICD-9-CM: 491.21  8/25/2019 Unknown                SUBJECTIVE:  C/O intermittent chest pain - worse with palpation  SOB improving    OBJECTIVE:    VS:   Visit Vitals  /71 (BP 1 Location: Right arm, BP Patient Position: At rest)   Pulse 76   Temp 98.4 °F (36.9 °C)   Resp 18   Ht 5' 4\" (1.626 m)   Wt 99.8 kg (220 lb 0.3 oz)   SpO2 100%   Breastfeeding? No   BMI 37.77 kg/m²         Intake/Output Summary (Last 24 hours) at 10/25/2019 0606  Last data filed at 10/24/2019 0800  Gross per 24 hour   Intake 240 ml   Output --   Net 240 ml     TELE: normal sinus rhythm    General: well developed and in no apparent distress  HENT: Normocephalic, atraumatic. Normal external eye. Neck :  JVD difficult to assess due to obesity  Cardiac:  regular rate and rhythm, S1, S2 normal, no murmur, click, rub or gallop  Lungs: clear to auscultation bilaterally, diminished breath sounds R base, L base  Abdomen: Soft, nontender, no masses  Extremities:  No c/c/e, peripheral pulses present      Labs: Results:       Chemistry Recent Labs     10/25/19  0344 10/24/19  0438 10/23/19  0245   * 94 107*    137 138   K 4.2 3.9 4.1    104 106   CO2 27 26 27   BUN 40* 45* 50*   CREA 1.59* 1.50* 1.57*   CA 8.5 8.9 8.9   AGAP 6 7 5   BUCR 25* 30* 32*      CBC w/Diff Recent Labs     10/25/19  0344 10/24/19  0438 10/23/19  0245   WBC 8.4 8.8 7.8   RBC 3.87* 3.86* 3.72*   HGB 9.4* 9.4* 9.0*   HCT 31.6* 31.3* 30.1*   * 438* 385   GRANS 51 55 65   LYMPH 36 33 24   EOS 2 2 0      Cardiac Enzymes No results for input(s): CPK, CKND1, GODFREY in the last 72 hours. No lab exists for component: CKRMB, TROIP   Coagulation No results for input(s): PTP, INR, APTT, INREXT, INREXT in the last 72 hours. Lipid Panel Lab Results   Component Value Date/Time    Cholesterol, total 152 10/10/2019 12:00 AM    HDL Cholesterol 46 10/10/2019 12:00 AM    LDL, calculated 86 10/10/2019 12:00 AM    VLDL, calculated 20 10/10/2019 12:00 AM    Triglyceride 101 10/10/2019 12:00 AM    CHOL/HDL Ratio 5.6 (H) 05/21/2018 06:23 AM      BNP No results for input(s): BNPP in the last 72 hours. Liver Enzymes No results for input(s): TP, ALB, TBIL, AP, SGOT, GPT in the last 72 hours.     No lab exists for component: DBIL   Thyroid Studies Lab Results   Component Value Date/Time    TSH 0.47 10/19/2019 08:40 PM              Dewayne Mireles MD

## 2019-10-25 NOTE — PROGRESS NOTES
[facility] Hospitalist Group  Progress Note    Patient: Bernice Gates Age: 81 y.o. : 1950 MR#: [id number] SSN: [id number]  Date: 10/25/2019     Subjective:   Ms. Davin Wood was seen in Cath holding in presence of RN, s/p left heart cath this morning. She states that she is still having 10/10 chest pain unchanged since hospitalization, worse with palpation of chest wall. Denies headache, dizziness, shortness of breath, abdominal pain, nausea/vomiting. I re-evaluated patient at 130pm in her room, she is still complaining of chest pain with palpation. Vital signs normal. No diaphoresis, shortness of breath, nausea, radiation of pain, change in mental status. Will try ultram and complete abdominal ultrasound. Assessment/Plan:   1. Acute hypoxic respiratory failure due to acute CHF exacerbation, pneumonia and less likely COPD status post extubation on 10/20  2.  Chest pain, reproducible. Cardiac work up negative thus far, could be element musculoskeletal pain v. Costochondritis v. Abdominal source? 3.  Acute combined systolic and diastolic heart failure with EF 46%  4. Acute metabolic encephalopathy due to ICU delirium, now resolved  5. Reportedly syncope at the time of admission, no recurrence  6.  Hypertension, conttolled on carvedilol  7.  Diabetes mellitus, on ISS  8.  Dyslipidemia, on atorvastatin  9.  COPD without exacerbation. 10.  Obstructive sleep apnea on home CPAP  11.  Obesity BMI 37   12.  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 Cr 1.59 (Cr 2.03 at time of admission, baseline 1.4)  13. CAD s/p YADIRA 2018    - s/p left heart cath today, follow up on official report, spoke to NP about cath, patent   - chest pain is reproducible, do not think SL nitro would be appropriate in this setting. Patient is already on voltaren, lidocaine patch, tylenol and ultram. Avoiding oral nsaids due to CARA. Avoiding narcotics for now.    - NPO after midnight for Complete abdominal ultrasound Saturday morning, confirmed with ultrasound who is on call sat and coming in.  - Continue with bumex with close monitoring of renal function, I/O, daily weights  - course of antibiotics and steroids completed; continue with duonebs and budesonide, incentive spirometer, bronchial hygiene  - PT/OT    Additional Notes:      Case discussed with:  [x]Patient  []Family  []Nursing  []Case Management  DVT Prophylaxis:  []Lovenox  [x]Hep SQ  []SCDs  []Coumadin   []On Heparin gtt    Objective:   VS:   Visit Vitals  /64   Pulse 82   Temp 98.2 °F (36.8 °C)   Resp 14   Ht 5' 4\" (1.626 m)   Wt 98.2 kg (216 lb 6.4 oz)   SpO2 96%   Breastfeeding? No   BMI 37.14 kg/m²      Tmax/24hrs: Temp (24hrs), Av.3 °F (36.8 °C), Min:97.8 °F (36.6 °C), Max:98.7 °F (37.1 °C)  No intake or output data in the 24 hours ending 10/25/19 1100    General:  Laying in bed, Alert, NAD  Cardiovascular:  RRR, no murmurs, reproducible chest pain with palpation.   Pulmonary:  On room air, speaking in complete sentences, respiratory effort WNL, clear to auscultation in anterior lung fields  GI:  +BS in all four quadrants, soft, non-tender  Extremities:  No edema; 2+ dorsalis pedis pulses bilaterally  Neuro: alert and oriented x3      Labs:    Recent Results (from the past 24 hour(s))   GLUCOSE, POC    Collection Time: 10/24/19 11:05 AM   Result Value Ref Range    Glucose (POC) 272 (H) 70 - 110 mg/dL   GLUCOSE, POC    Collection Time: 10/24/19  1:26 PM   Result Value Ref Range    Glucose (POC) 180 (H) 70 - 110 mg/dL   GLUCOSE, POC    Collection Time: 10/24/19  3:35 PM   Result Value Ref Range    Glucose (POC) 148 (H) 70 - 110 mg/dL   GLUCOSE, POC    Collection Time: 10/24/19 11:19 PM   Result Value Ref Range    Glucose (POC) 133 (H) 70 - 110 mg/dL   MAGNESIUM    Collection Time: 10/25/19  3:44 AM   Result Value Ref Range    Magnesium 2.1 1.6 - 2.6 mg/dL   CBC WITH AUTOMATED DIFF    Collection Time: 10/25/19  3:44 AM   Result Value Ref Range WBC 8.4 4.6 - 13.2 K/uL    RBC 3.87 (L) 4.20 - 5.30 M/uL    HGB 9.4 (L) 12.0 - 16.0 g/dL    HCT 31.6 (L) 35.0 - 45.0 %    MCV 81.7 74.0 - 97.0 FL    MCH 24.3 24.0 - 34.0 PG    MCHC 29.7 (L) 31.0 - 37.0 g/dL    RDW 18.5 (H) 11.6 - 14.5 %    PLATELET 614 (H) 738 - 420 K/uL    MPV 9.4 9.2 - 11.8 FL    NEUTROPHILS 51 40 - 73 %    LYMPHOCYTES 36 21 - 52 %    MONOCYTES 11 (H) 3 - 10 %    EOSINOPHILS 2 0 - 5 %    BASOPHILS 0 0 - 2 %    ABS. NEUTROPHILS 4.3 1.8 - 8.0 K/UL    ABS. LYMPHOCYTES 3.0 0.9 - 3.6 K/UL    ABS. MONOCYTES 0.9 0.05 - 1.2 K/UL    ABS. EOSINOPHILS 0.2 0.0 - 0.4 K/UL    ABS.  BASOPHILS 0.0 0.0 - 0.1 K/UL    DF AUTOMATED     METABOLIC PANEL, BASIC    Collection Time: 10/25/19  3:44 AM   Result Value Ref Range    Sodium 138 136 - 145 mmol/L    Potassium 4.2 3.5 - 5.5 mmol/L    Chloride 105 100 - 111 mmol/L    CO2 27 21 - 32 mmol/L    Anion gap 6 3.0 - 18 mmol/L    Glucose 103 (H) 74 - 99 mg/dL    BUN 40 (H) 7.0 - 18 MG/DL    Creatinine 1.59 (H) 0.6 - 1.3 MG/DL    BUN/Creatinine ratio 25 (H) 12 - 20      GFR est AA 39 (L) >60 ml/min/1.73m2    GFR est non-AA 32 (L) >60 ml/min/1.73m2    Calcium 8.5 8.5 - 10.1 MG/DL   GLUCOSE, POC    Collection Time: 10/25/19  7:50 AM   Result Value Ref Range    Glucose (POC) 99 70 - 110 mg/dL       Signed By: LORE Patel     October 25, 2019

## 2019-10-25 NOTE — PROGRESS NOTES
Pt signed Freedom of Choice form for [address] Case Management is following and will arrange home health when ordered.     LUZ MARIA PortilloN RN  Care Management  Pager: [phone]

## 2019-10-26 ENCOUNTER — APPOINTMENT (OUTPATIENT)
Dept: ULTRASOUND IMAGING | Age: 69
DRG: 208 | End: 2019-10-26
Attending: PHYSICIAN ASSISTANT
Payer: MEDICARE

## 2019-10-26 ENCOUNTER — APPOINTMENT (OUTPATIENT)
Dept: GENERAL RADIOLOGY | Age: 69
DRG: 208 | End: 2019-10-26
Attending: PHYSICIAN ASSISTANT
Payer: MEDICARE

## 2019-10-26 ENCOUNTER — APPOINTMENT (OUTPATIENT)
Dept: GENERAL RADIOLOGY | Age: 69
DRG: 208 | End: 2019-10-26
Attending: FAMILY MEDICINE
Payer: MEDICARE

## 2019-10-26 LAB
ALBUMIN SERPL-MCNC: 3.2 G/DL (ref 3.4–5)
ALBUMIN/GLOB SERPL: 1 {RATIO} (ref 0.8–1.7)
ALP SERPL-CCNC: 105 U/L (ref 45–117)
ALT SERPL-CCNC: 19 U/L (ref 13–56)
ANION GAP SERPL CALC-SCNC: 7 MMOL/L (ref 3–18)
AST SERPL-CCNC: 8 U/L (ref 10–38)
BASOPHILS # BLD: 0 K/UL (ref 0–0.1)
BASOPHILS NFR BLD: 0 % (ref 0–2)
BILIRUB SERPL-MCNC: 0.4 MG/DL (ref 0.2–1)
BUN SERPL-MCNC: 38 MG/DL (ref 7–18)
BUN/CREAT SERPL: 22 (ref 12–20)
CALCIUM SERPL-MCNC: 8.8 MG/DL (ref 8.5–10.1)
CHLORIDE SERPL-SCNC: 106 MMOL/L (ref 100–111)
CO2 SERPL-SCNC: 27 MMOL/L (ref 21–32)
CREAT SERPL-MCNC: 1.73 MG/DL (ref 0.6–1.3)
DIFFERENTIAL METHOD BLD: ABNORMAL
EOSINOPHIL # BLD: 0.3 K/UL (ref 0–0.4)
EOSINOPHIL NFR BLD: 4 % (ref 0–5)
ERYTHROCYTE [DISTWIDTH] IN BLOOD BY AUTOMATED COUNT: 18.8 % (ref 11.6–14.5)
GLOBULIN SER CALC-MCNC: 3.1 G/DL (ref 2–4)
GLUCOSE BLD STRIP.AUTO-MCNC: 100 MG/DL (ref 70–110)
GLUCOSE BLD STRIP.AUTO-MCNC: 132 MG/DL (ref 70–110)
GLUCOSE BLD STRIP.AUTO-MCNC: 142 MG/DL (ref 70–110)
GLUCOSE BLD STRIP.AUTO-MCNC: 161 MG/DL (ref 70–110)
GLUCOSE SERPL-MCNC: 111 MG/DL (ref 74–99)
HCT VFR BLD AUTO: 32.3 % (ref 35–45)
HGB BLD-MCNC: 9.6 G/DL (ref 12–16)
LIPASE SERPL-CCNC: 147 U/L (ref 73–393)
LYMPHOCYTES # BLD: 2.6 K/UL (ref 0.9–3.6)
LYMPHOCYTES NFR BLD: 35 % (ref 21–52)
MAGNESIUM SERPL-MCNC: 2 MG/DL (ref 1.6–2.6)
MCH RBC QN AUTO: 24.5 PG (ref 24–34)
MCHC RBC AUTO-ENTMCNC: 29.7 G/DL (ref 31–37)
MCV RBC AUTO: 82.4 FL (ref 74–97)
MONOCYTES # BLD: 0.9 K/UL (ref 0.05–1.2)
MONOCYTES NFR BLD: 12 % (ref 3–10)
NEUTS SEG # BLD: 3.7 K/UL (ref 1.8–8)
NEUTS SEG NFR BLD: 49 % (ref 40–73)
PLATELET # BLD AUTO: 500 K/UL (ref 135–420)
PMV BLD AUTO: 9.4 FL (ref 9.2–11.8)
POTASSIUM SERPL-SCNC: 4.4 MMOL/L (ref 3.5–5.5)
PROT SERPL-MCNC: 6.3 G/DL (ref 6.4–8.2)
RBC # BLD AUTO: 3.92 M/UL (ref 4.2–5.3)
SODIUM SERPL-SCNC: 140 MMOL/L (ref 136–145)
WBC # BLD AUTO: 7.6 K/UL (ref 4.6–13.2)

## 2019-10-26 PROCEDURE — 74011000250 HC RX REV CODE- 250: Performed by: INTERNAL MEDICINE

## 2019-10-26 PROCEDURE — 74011636637 HC RX REV CODE- 636/637: Performed by: INTERNAL MEDICINE

## 2019-10-26 PROCEDURE — 94640 AIRWAY INHALATION TREATMENT: CPT

## 2019-10-26 PROCEDURE — 36415 COLL VENOUS BLD VENIPUNCTURE: CPT

## 2019-10-26 PROCEDURE — 76700 US EXAM ABDOM COMPLETE: CPT

## 2019-10-26 PROCEDURE — 74011000250 HC RX REV CODE- 250: Performed by: PHYSICIAN ASSISTANT

## 2019-10-26 PROCEDURE — 74011250637 HC RX REV CODE- 250/637: Performed by: PHYSICIAN ASSISTANT

## 2019-10-26 PROCEDURE — 71046 X-RAY EXAM CHEST 2 VIEWS: CPT

## 2019-10-26 PROCEDURE — 83735 ASSAY OF MAGNESIUM: CPT

## 2019-10-26 PROCEDURE — 80053 COMPREHEN METABOLIC PANEL: CPT

## 2019-10-26 PROCEDURE — 82962 GLUCOSE BLOOD TEST: CPT

## 2019-10-26 PROCEDURE — 65660000000 HC RM CCU STEPDOWN

## 2019-10-26 PROCEDURE — 94761 N-INVAS EAR/PLS OXIMETRY MLT: CPT

## 2019-10-26 PROCEDURE — 83690 ASSAY OF LIPASE: CPT

## 2019-10-26 PROCEDURE — 85025 COMPLETE CBC W/AUTO DIFF WBC: CPT

## 2019-10-26 PROCEDURE — 74011250637 HC RX REV CODE- 250/637: Performed by: INTERNAL MEDICINE

## 2019-10-26 PROCEDURE — 74011250637 HC RX REV CODE- 250/637: Performed by: NURSE PRACTITIONER

## 2019-10-26 RX ADMIN — PANTOPRAZOLE SODIUM 40 MG: 40 TABLET, DELAYED RELEASE ORAL at 10:54

## 2019-10-26 RX ADMIN — INSULIN LISPRO 2 UNITS: 100 INJECTION, SOLUTION INTRAVENOUS; SUBCUTANEOUS at 17:00

## 2019-10-26 RX ADMIN — Medication 10 ML: at 06:53

## 2019-10-26 RX ADMIN — CARVEDILOL 6.25 MG: 12.5 TABLET, FILM COATED ORAL at 16:58

## 2019-10-26 RX ADMIN — IPRATROPIUM BROMIDE AND ALBUTEROL SULFATE 3 ML: .5; 3 SOLUTION RESPIRATORY (INHALATION) at 14:46

## 2019-10-26 RX ADMIN — IPRATROPIUM BROMIDE AND ALBUTEROL SULFATE 3 ML: .5; 3 SOLUTION RESPIRATORY (INHALATION) at 19:57

## 2019-10-26 RX ADMIN — Medication 5 ML: at 14:00

## 2019-10-26 RX ADMIN — BUDESONIDE 500 MCG: 0.5 INHALANT RESPIRATORY (INHALATION) at 07:55

## 2019-10-26 RX ADMIN — ACETAMINOPHEN 650 MG: 325 TABLET ORAL at 12:39

## 2019-10-26 RX ADMIN — SUCRALFATE 1 G: 1 TABLET ORAL at 10:54

## 2019-10-26 RX ADMIN — PANTOPRAZOLE SODIUM 40 MG: 40 TABLET, DELAYED RELEASE ORAL at 16:58

## 2019-10-26 RX ADMIN — BUDESONIDE 500 MCG: 0.5 INHALANT RESPIRATORY (INHALATION) at 19:57

## 2019-10-26 RX ADMIN — Medication 30 ML: at 10:54

## 2019-10-26 RX ADMIN — Medication 81 MG: at 10:54

## 2019-10-26 RX ADMIN — IPRATROPIUM BROMIDE AND ALBUTEROL SULFATE 3 ML: .5; 3 SOLUTION RESPIRATORY (INHALATION) at 07:55

## 2019-10-26 RX ADMIN — Medication 10 ML: at 21:29

## 2019-10-26 RX ADMIN — MELATONIN TAB 5 MG 5 MG: 5 TAB at 21:37

## 2019-10-26 RX ADMIN — CARVEDILOL 6.25 MG: 12.5 TABLET, FILM COATED ORAL at 10:54

## 2019-10-26 RX ADMIN — IPRATROPIUM BROMIDE AND ALBUTEROL SULFATE 3 ML: .5; 3 SOLUTION RESPIRATORY (INHALATION) at 01:30

## 2019-10-26 RX ADMIN — BUMETANIDE 1 MG: 1 TABLET ORAL at 10:54

## 2019-10-26 RX ADMIN — ATORVASTATIN CALCIUM 20 MG: 20 TABLET, FILM COATED ORAL at 21:28

## 2019-10-26 RX ADMIN — TRAMADOL HYDROCHLORIDE 50 MG: 50 TABLET ORAL at 11:07

## 2019-10-26 NOTE — ROUTINE PROCESS
Bedside shift change report given to [address] [place], [place] [address] (oncoming nurse) by Schuyler Yanes (offgoing nurse). Report included the following information SBAR, Kardex and MAR.

## 2019-10-26 NOTE — PROGRESS NOTES
Patient ambulated around the unit multiple times on room air. O2 sat between %. No signs of respiratory distress.

## 2019-10-26 NOTE — PROGRESS NOTES
[facility]      CARDIOLOGY PROGRESS NOTE  RECS:  1. Respiratory Failure s/p Mechanical Ventilation - now extubated   2. Syncope CT Head: No acute findings.   3. Coronary artery disease   · 8/28/19 NST: Left ventricular function post-stress was abnormal. Wall motion was abnormal at stress as described below in the wall scoring diagram. Calculated ejection fraction is 44%. The TID ratio is 0.94. Left ventricular perfusion is abnormal.   Perfusion defect 1: There is a left ventricular perfusion defect that is moderate in size with a severe reduction in uptake affecting the mid-apical anterior and apex location(s) that is non-reversible. There is abnormal wall motion in the defect area. Viability in the area is poor. The defect appears to be infarction. Perfusion defect was visually present without quantitative evidence. · s/p YADIRA to proximal LAD with RESOLUTE stent 5/20/2018.  Acute stent thrombosis shortly after initial PCI on 5/20/2018 requiring emergency intubation for pulmonary edema and repeat cardiac cath and additional PCI/YADIRA to prox LAD.   · Patent stent by cath 10/2019. 4.  Chronic Heart failure with mild systolic dysfunction exacerbation            -improved very well clinically.   Repeat echo with EF 46%. No    recurrent dyspnea overnight.   Per nephrology - may use         Lasix PRN.     5.   Hx of Ischemic cardiomyopathy- with EF 35% on echo  5/21/2018  with    elevated pulmonary pressures at 44mm Hg- now   with mild systolic   Dysfunction Recent Echo 8/7/19 showed  EF% 45%-50%.    6.  Hypertension- stable  on Entresto and bb   7.  DM Type II- medications per medical team   8.  Dyslipidemia- on Lipitor 20 mg. LDL 80 on 8/19   9.  COPD exacerbation- managed by medical team   10. Hx of Tobacco abuse with   1 ppd X 50 years- patient quit in 2018  11. Obesity- weight loss advised  12. TESSA- uses CPAP at night   13. Urinary tract infection  14.  CARA on CKD - renal function improving. 15. Chest pain - reproducible to palpation. Cardiac cath with patent stent - further evaluation per primary team. Abdominal U/S pending.      Per chart review, nursing note reflected Dr. Edna TORREZ at bedside 10/25 at 1140. I was not in hospital on 10/25. LORE Nesbitt examined patient and note is listed in chart. Cardiac cath 10/25/2019  Conclusion     Patent stent in proximal LAD. Mildly reduced LV function. Continue intense risk factor modification. Complications     Complications documented before study signed (10/25/2019  1:50 PM EDT)      No complications were associated with this study. Documented by Yessica Green MD - 10/25/2019  1:50 PM EDT      Coronary Findings     Diagnostic   Dominance: Right   Left Main   The vessel was visualized by angiography. The vessel is angiographically normal.   Left Anterior Descending   Previously placed Ost LAD to Prox LAD stent (unknown type) is widely patent. First Diagonal Branch   Ost 1st Diag lesion 50% stenosed. .   Left Circumflex   The vessel exhibits minimal luminal irregularities. Right Coronary Artery   The vessel exhibits minimal luminal irregularities. Intervention     No interventions have been documented. 10/19/19 Echocardiogram:     · Left Ventricle: Mildly dilated left ventricle. Mild concentric hypertrophy. Mild-to-moderate systolic dysfunction. Calculated left ventricular ejection fraction is 46%. Biplane method used to measure ejection fraction. · Mitral Valve: Mitral valve thickening. Mitral annular calcification. Moderate mitral valve regurgitation is present. The mitral regurgitant jet is eccentric. · Interatrial Septum: Agitated saline contrast study was performed. No obvious septal defect.          ASSESSMENT:  Hospital Problems  Date Reviewed: 10/11/2019          Codes Class Noted POA    Pulmonary edema cardiac cause [facility]) ICD-10-CM: I50.1  ICD-9-CM: 564  10/19/2019 Unknown        COPD with acute exacerbation [facility]) ICD-10-CM: J44.1  ICD-9-CM: 491.21  10/19/2019 Unknown        Respiratory failure requiring intubation [facility]) ICD-10-CM: J96.90  ICD-9-CM: 518.81  10/19/2019 Unknown        Syncope ICD-10-CM: R55  ICD-9-CM: 780.2  10/18/2019 Unknown        Flash pulmonary edema ([address] ICD-10-CM: J81.0  ICD-9-CM: 518.4  10/18/2019 Unknown        Elevated d-dimer ICD-10-CM: R79.89  ICD-9-CM: 790.92  10/18/2019 Unknown        * (Principal) Acute on chronic respiratory failure with hypoxia (HCC) ICD-10-CM: J96.21  ICD-9-CM: 518.84, 799.02  10/18/2019 Unknown        CAD (coronary artery disease) ICD-10-CM: I25.10  ICD-9-CM: 414.00  10/18/2019     Overview Signed 10/24/2019 10:43 AM by Hannah Vaughn RN     Added automatically from request for surgery [id number]             COPD exacerbation [facility]) ICD-10-CM: J44.1  ICD-9-CM: 491.21  8/25/2019 Unknown                SUBJECTIVE:  C/O reproducible chest pain  Denies SOB    OBJECTIVE:    VS:   Visit Vitals  /71 (BP 1 Location: Left arm, BP Patient Position: At rest)   Pulse 84   Temp 97.2 °F (36.2 °C)   Resp 17   Ht 5' 4\" (1.626 m)   Wt 98.1 kg (216 lb 4.8 oz)   SpO2 92%   Breastfeeding? No   BMI 37.13 kg/m²         Intake/Output Summary (Last 24 hours) at 10/26/2019 1029  Last data filed at 10/25/2019 1830  Gross per 24 hour   Intake 1250 ml   Output 500 ml   Net 750 ml     TELE: normal sinus rhythm    General: alert and in no apparent distress  HENT: Normocephalic, atraumatic. Normal external eye. Neck :  no JVD  Cardiac:  regular rate and rhythm, S1, S2 normal, no murmur, click, rub or gallop + painful to palpation  Lungs: clear to auscultation bilaterally  Abdomen: Soft, nontender, no masses  Extremities:  No c/c/e, peripheral pulses present, right wrist cath access site intact with dressing, + radial pulse.       Labs: Results:       Chemistry Recent Labs     10/26/19  0315 10/25/19  0344 10/24/19  0438   * 103* 94    138 137   K 4.4 4.2 3.9    105 104   CO2 27 27 26   BUN 38* 40* 45*   CREA 1.73* 1.59* 1.50*   CA 8.8 8.5 8.9   AGAP 7 6 7   BUCR 22* 25* 30*     --   --    TP 6.3*  --   --    ALB 3.2*  --   --    GLOB 3.1  --   --    AGRAT 1.0  --   --       CBC w/Diff Recent Labs     10/26/19  0315 10/25/19  0344 10/24/19  0438   WBC 7.6 8.4 8.8   RBC 3.92* 3.87* 3.86*   HGB 9.6* 9.4* 9.4*   HCT 32.3* 31.6* 31.3*   * 482* 438*   GRANS 49 51 55   LYMPH 35 36 33   EOS 4 2 2      Cardiac Enzymes No results for input(s): CPK, CKND1, GODFREY in the last 72 hours. No lab exists for component: CKRMB, TROIP   Coagulation No results for input(s): PTP, INR, APTT, INREXT in the last 72 hours. Lipid Panel Lab Results   Component Value Date/Time    Cholesterol, total 152 10/10/2019 12:00 AM    HDL Cholesterol 46 10/10/2019 12:00 AM    LDL, calculated 86 10/10/2019 12:00 AM    VLDL, calculated 20 10/10/2019 12:00 AM    Triglyceride 101 10/10/2019 12:00 AM    CHOL/HDL Ratio 5.6 (H) 05/21/2018 06:23 AM      BNP No results for input(s): BNPP in the last 72 hours. Liver Enzymes Recent Labs     10/26/19  0315   TP 6.3*   ALB 3.2*      SGOT 8*      Thyroid Studies Lab Results   Component Value Date/Time    TSH 0.47 10/19/2019 08:40 PM              Isai López NP   Pager # [phone]  I have independently evaluated taken history and examined the patient. All relevant labs and testing data's are reviewed. Care plan discussed and updated after review.   Shekhar Baig MD

## 2019-10-26 NOTE — ROUTINE PROCESS
Bedside and Verbal shift change report given to Fabian Dawson RN (oncoming nurse) by Eduardo Lanza RN   (offgoing nurse). Report included the following information SBAR, Kardex, MAR, Recent Results and Cardiac Rhythm NSR.

## 2019-10-26 NOTE — PROGRESS NOTES
Progress Note    76y F with PMH HTN, CKD, COPD, heart failure, admitted for respiratory failure, seen for renal insufficieny  Subjective    S/p cardiac cath, patent cardiac stent    IMPRESSION:   CARA,   CKD 3, baseline creatinine arorund 1.4  Heavy proteinuria  Respiratory failure, acute, required intubation on this admission, now extubated  Heart failure  COPD  Anemia   Secondary hyperparathyroid of CKD  Chest pain, s/p cardiac cath on this admission, patent stent, on NSAIDs for her pain    PLAN:   Her renal function is not far from her baseline, no new rec from renal stand point. Discussed need to monitor renal function outpatient as she is on nsaids, and diuretics. Will be available if any question or concern, please provide follow up in 3-4 weeks in office. Adjust meds per current renal function status.                 Current Facility-Administered Medications   Medication Dose Route Frequency    sodium chloride (NS) flush 5-40 mL  5-40 mL IntraVENous Q8H    sodium chloride (NS) flush 5-40 mL  5-40 mL IntraVENous PRN    atorvastatin (LIPITOR) tablet 20 mg  20 mg Oral QHS    dextrose 10% infusion 125-250 mL  125-250 mL IntraVENous PRN    traMADol (ULTRAM) tablet 50 mg  50 mg Oral Q6H PRN    melatonin tablet 5 mg  5 mg Oral QHS    pantoprazole (PROTONIX) tablet 40 mg  40 mg Oral ACB&D    sucralfate (CARAFATE) tablet 1 g  1 g Oral AC&HS    bumetanide (BUMEX) tablet 1 mg  1 mg Oral DAILY    albuterol-ipratropium (DUO-NEB) 2.5 MG-0.5 MG/3 ML  3 mL Nebulization Q6H RT    lidocaine 4 % patch 1 Patch  1 Patch TransDERmal Q24H    diclofenac (VOLTAREN) 1 % topical gel 2 g  2 g Topical QID    tiZANidine (ZANAFLEX) tablet 2 mg  2 mg Oral QHS PRN    aspirin chewable tablet 81 mg  81 mg Oral DAILY    carvedilol (COREG) tablet 6.25 mg  6.25 mg Oral BID WITH MEALS    insulin lispro (HUMALOG) injection   SubCUTAneous AC&HS    heparin (porcine) injection 5,000 Units  5,000 Units SubCUTAneous Q8H    sodium chloride (NS) flush 5-40 mL  5-40 mL IntraVENous Q8H    sodium chloride (NS) flush 5-40 mL  5-40 mL IntraVENous PRN    acetaminophen (TYLENOL) tablet 650 mg  650 mg Oral Q6H PRN    ondansetron (ZOFRAN) injection 4 mg  4 mg IntraVENous Q6H PRN    glucose chewable tablet 16 g  4 Tab Oral PRN    glucagon (GLUCAGEN) injection 1 mg  1 mg IntraMUSCular PRN    budesonide (PULMICORT) 500 mcg/2 ml nebulizer suspension  500 mcg Nebulization BID RT    multivit-folic acid-herbal 160 (WELLESSE PLUS) oral liquid 30 mL  30 mL Per NG tube DAILY       Review of Systems:     Not able to provide  Data Review:    Labs: Results:       Chemistry Recent Labs     10/26/19  0315 10/25/19  0344 10/24/19  0438   * 103* 94    138 137   K 4.4 4.2 3.9    105 104   CO2 27 27 26   BUN 38* 40* 45*   CREA 1.73* 1.59* 1.50*   CA 8.8 8.5 8.9   AGAP 7 6 7   BUCR 22* 25* 30*     --   --    TP 6.3*  --   --    ALB 3.2*  --   --    GLOB 3.1  --   --    AGRAT 1.0  --   --       CBC w/Diff Recent Labs     10/26/19  0315 10/25/19  0344 10/24/19  0438   WBC 7.6 8.4 8.8   RBC 3.92* 3.87* 3.86*   HGB 9.6* 9.4* 9.4*   HCT 32.3* 31.6* 31.3*   * 482* 438*   GRANS 49 51 55   LYMPH 35 36 33   EOS 4 2 2      Coagulation No results for input(s): PTP, INR, APTT, INREXT, INREXT in the last 72 hours. Iron/Ferritin No results for input(s): IRON in the last 72 hours. No lab exists for component: TIBCCALC   BNP No results for input(s): BNPP in the last 72 hours. Cardiac Enzymes No results for input(s): CPK, CKND1, GODFREY in the last 72 hours.     No lab exists for component: CKRMB, TROIP   Liver Enzymes Recent Labs     10/26/19  0315   TP 6.3*   ALB 3.2*      SGOT 8*      Thyroid Studies Lab Results   Component Value Date/Time    TSH 0.47 10/19/2019 08:40 PM         EKG: sinus     Physical Assessment:     Visit Vitals  /71 (BP 1 Location: Left arm, BP Patient Position: At rest)   Pulse 95   Temp 98.3 °F (36.8 °C) Resp 16   Ht 5' 4\" (1.626 m)   Wt 98.1 kg (216 lb 4.8 oz)   SpO2 95%   Breastfeeding?  No   BMI 37.13 kg/m²     Weight change: -0.045 kg (-1.6 oz)    Intake/Output Summary (Last 24 hours) at 10/26/2019 1218  Last data filed at 10/25/2019 1830  Gross per 24 hour   Intake 1250 ml   Output 500 ml   Net 750 ml     Physical Exam:   General: no acute respiratory distress   HEENT  supple neck, no thyromegaly  CVS: S1S2 heard,  no rub  RS: + air entry b/l,   Abd: Soft, Non tender,   Neuro: intubated, sedated  Extrm: + edema, no cyanosis, clubbing   Skin: no visible  Rash  Musculoskeletal: No gross joints or bone deformities     Procedures/imaging: see electronic medical records for all procedures, Xrays and details which were not copied into this note but were reviewed prior to creation of Meli Gallegos MD  October 26, 2019  [facility]  Office [phone]

## 2019-10-26 NOTE — PROGRESS NOTES
Problem: Falls - Risk of  Goal: *Absence of Falls  Description  Document Mychal Mendez Fall Risk and appropriate interventions in the flowsheet.   Outcome: Progressing Towards Goal  Note:   Fall Risk Interventions:  Mobility Interventions: Patient to call before getting OOB, Communicate number of staff needed for ambulation/transfer    Mentation Interventions: Evaluate medications/consider consulting pharmacy    Medication Interventions: Evaluate medications/consider consulting pharmacy, Patient to call before getting OOB    Elimination Interventions: Call light in reach, Patient to call for help with toileting needs    History of Falls Interventions: Evaluate medications/consider consulting pharmacy

## 2019-10-26 NOTE — PROGRESS NOTES
[facility]  Progress Note    Patient: Garrett Arana Age: 81 y.o. : 1950 MR#: [id number] SSN: [id number]  Date: 10/26/2019     Subjective:     Resting comfortably in bed.   - Still having chest pain; substernal area to epigastrium, also some pain RUQ/under R breast   - Diclofenac, Lidocaine not working; Tizanidine and Tramadol working   - Denies SOB, n/v/d/c/abd pain     Assessment/Plan:   1. Acute hypoxic respiratory failure due to acute CHF exacerbation, pneumonia and less likely COPD status post extubation on 10/20  2.  Chest pain, reproducible. Cardiac work up negative thus far, could be element musculoskeletal pain v. Costochondritis v. Abdominal source  3.  Acute combined systolic and diastolic heart failure with EF 46%  4. Acute metabolic encephalopathy due to ICU delirium, now resolved  5. Reportedly syncope at the time of admission, no recurrence  6.  Hypertension, conttolled on carvedilol  7.  Diabetes mellitus, on ISS  8.  Dyslipidemia, on atorvastatin  9.  COPD without exacerbation. 10.  Obstructive sleep apnea on home CPAP  11.  Obesity BMI 37   12.  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 Cr 1.59 (Cr 2.03 at time of admission, baseline 1.4)  13. CAD s/p YADIRA 2018      - chest pain is easily reproducible, do not think SL nitro would be appropriate in this setting. Discontinue Lidocaine and Voltaren. Avoiding oral nsaids due to CARA. Continue Tyl, Tizanidine and Tramadol.   - Complete Abd US- no findings to explain pt's chest/RUQ pain   - CXR today: Suspected mild interstitial infiltrate/edema. Minimal basilar streaky densities, probably atelectasis, less likely infiltrate.  Pt has finished course of Abx and steroids.   - Continue with bumex with close monitoring of renal function, I/O, daily weights  - course of antibiotics and steroids completed; continue with duonebs and budesonide, incentive spirometer, bronchial hygiene  - will f/u with nephrology in 3-4 weeks in office   - PT/OT    Disposition: Home with [facility]; likely      Additional Notes:      Case discussed with:  [x]Patient  []Family  [x]Nursing  []Case Management  DVT Prophylaxis:  []Lovenox  [x]Hep SQ  []SCDs  []Coumadin   []On Heparin gtt    Objective:   VS:   Visit Vitals  /71 (BP 1 Location: Left arm, BP Patient Position: At rest)   Pulse 84   Temp 97.2 °F (36.2 °C)   Resp 17   Ht 5' 4\" (1.626 m)   Wt 98.1 kg (216 lb 4.8 oz)   SpO2 92%   Breastfeeding? No   BMI 37.13 kg/m²      Tmax/24hrs: Temp (24hrs), Av °F (36.7 °C), Min:97.2 °F (36.2 °C), Max:98.6 °F (37 °C)      Intake/Output Summary (Last 24 hours) at 10/26/2019 0956  Last data filed at 10/25/2019 1830  Gross per 24 hour   Intake 1250 ml   Output 500 ml   Net 750 ml       General:  Laying in bed, Alert, NAD  Cardiovascular:  RRR, no murmurs, reproducible chest pain with palpation.   Pulmonary:  On room air, speaking in complete sentences, respiratory effort WNL, clear to auscultation in anterior lung fields  GI:  +BS in all four quadrants, soft, non-tender  Extremities:  No edema; 2+ dorsalis pedis pulses bilaterally  Neuro: alert and oriented x3      Labs:    Recent Results (from the past 24 hour(s))   GLUCOSE, POC    Collection Time: 10/25/19  3:58 PM   Result Value Ref Range    Glucose (POC) 147 (H) 70 - 110 mg/dL   MAGNESIUM    Collection Time: 10/26/19  3:15 AM   Result Value Ref Range    Magnesium 2.0 1.6 - 2.6 mg/dL   CBC WITH AUTOMATED DIFF    Collection Time: 10/26/19  3:15 AM   Result Value Ref Range    WBC 7.6 4.6 - 13.2 K/uL    RBC 3.92 (L) 4.20 - 5.30 M/uL    HGB 9.6 (L) 12.0 - 16.0 g/dL    HCT 32.3 (L) 35.0 - 45.0 %    MCV 82.4 74.0 - 97.0 FL    MCH 24.5 24.0 - 34.0 PG    MCHC 29.7 (L) 31.0 - 37.0 g/dL    RDW 18.8 (H) 11.6 - 14.5 %    PLATELET 994 (H) 906 - 420 K/uL    MPV 9.4 9.2 - 11.8 FL    NEUTROPHILS 49 40 - 73 %    LYMPHOCYTES 35 21 - 52 %    MONOCYTES 12 (H) 3 - 10 %    EOSINOPHILS 4 0 - 5 % BASOPHILS 0 0 - 2 %    ABS. NEUTROPHILS 3.7 1.8 - 8.0 K/UL    ABS. LYMPHOCYTES 2.6 0.9 - 3.6 K/UL    ABS. MONOCYTES 0.9 0.05 - 1.2 K/UL    ABS. EOSINOPHILS 0.3 0.0 - 0.4 K/UL    ABS. BASOPHILS 0.0 0.0 - 0.1 K/UL    DF AUTOMATED     LIPASE    Collection Time: 10/26/19  3:15 AM   Result Value Ref Range    Lipase 147 73 - 247 U/L   METABOLIC PANEL, COMPREHENSIVE    Collection Time: 10/26/19  3:15 AM   Result Value Ref Range    Sodium 140 136 - 145 mmol/L    Potassium 4.4 3.5 - 5.5 mmol/L    Chloride 106 100 - 111 mmol/L    CO2 27 21 - 32 mmol/L    Anion gap 7 3.0 - 18 mmol/L    Glucose 111 (H) 74 - 99 mg/dL    BUN 38 (H) 7.0 - 18 MG/DL    Creatinine 1.73 (H) 0.6 - 1.3 MG/DL    BUN/Creatinine ratio 22 (H) 12 - 20      GFR est AA 36 (L) >60 ml/min/1.73m2    GFR est non-AA 29 (L) >60 ml/min/1.73m2    Calcium 8.8 8.5 - 10.1 MG/DL    Bilirubin, total 0.4 0.2 - 1.0 MG/DL    ALT (SGPT) 19 13 - 56 U/L    AST (SGOT) 8 (L) 10 - 38 U/L    Alk. phosphatase 105 45 - 117 U/L    Protein, total 6.3 (L) 6.4 - 8.2 g/dL    Albumin 3.2 (L) 3.4 - 5.0 g/dL    Globulin 3.1 2.0 - 4.0 g/dL    A-G Ratio 1.0 0.8 - 1.7     GLUCOSE, POC    Collection Time: 10/26/19  7:53 AM   Result Value Ref Range    Glucose (POC) 100 70 - 110 mg/dL       Imaging    Complete Abd US 10/26/19: IMPRESSION:  Cholelithiasis without evidence of cholecystitis. Slight coarsened hepatic echotexture suggests nonspecific hepatocellular  disease. Small right renal cyst.  No other significant findings. Exam limitations described above. CXR 10/26/19: Suspected mild interstitial infiltrate/edema. Minimal basilar streaky densities,probably atelectasis, less likely infiltrate.           Ulysses Serrano, DO   October 26, 2019

## 2019-10-26 NOTE — PROGRESS NOTES
Respiratory Care Assessment for Bronchial hygiene or Lung Expansion Therapy  Patient  Candance Glen     81 y.o.   female     10/26/2019  4:54 PM  Patient Active Problem List   Diagnosis Code    Cardiomyopathy, dilated ([facility] I42.0    Environmental allergies Z91.09    Diabetes mellitus type 2, insulin dependent ([facility] E11.9, Z79.4    COPD (chronic obstructive pulmonary disease) ([facility] J44.9    Essential hypertension I10    Iron deficiency anemia D50.9    Decreased GFR R94.4    Dyslipidemia, goal LDL below 70 E78.5    Former smoker Z87.891    Osteoarthritis of both knees M17.0    Chronic pain of both knees M25.561, M25.562, G89.29    Chronic pain syndrome G89.4    Primary osteoarthritis of both knees M17.0    Chronic obstructive pulmonary disease (HCC) J44.9    Compliance with medication regimen Z91.89    Bilateral shoulder pain M25.511, M25.512    Elevated alkaline phosphatase level R74.8    Caregiver stress Z63.6    STEMI (ST elevation myocardial infarction) ([facility]) I21.3    Acute pulmonary edema ([facility]) J81.0    Coronary artery disease involving native coronary artery of native heart without angina pectoris I25.10    Pulmonary edema J81.1    Chest pain R07.9    SOB (shortness of breath) R06.02    Coronary artery disease involving native coronary artery with unstable angina pectoris ([facility]) I25.110    Type 2 diabetes with nephropathy ([facility]) E11.21    Severe obesity (BMI 35.0-39. 9) with comorbidity ([facility] E66.01    COPD exacerbation ([facility] J44.1    CAP (community acquired pneumonia) J18.9    Combined systolic and diastolic congestive heart failure (HCC) I50.40    Type 2 diabetes mellitus with hyperglycemia ([facility]) E11.65    TESSA on CPAP G47.33, Z99.89    Ischemic cardiomyopathy I25.5    Syncope R55    Flash pulmonary edema ([facility]) J81.0    Elevated d-dimer R79.89    Acute on chronic respiratory failure with hypoxia ([facility]) J96.21    Pulmonary edema cardiac cause ([facility]) I50.1    COPD with acute exacerbation ([facility] J44.1    Respiratory failure requiring intubation ([facility] J96.90    CAD (coronary artery disease) I25.10       ABG:  Date:10/26/2019  No results found for: PH, PHI, PCO2, PCO2I, PO2, PO2I, HCO3, HCO3I, FIO2, FIO2I    Chest X-ray:  Date:10/26/2019  Results from Hospital Encounter encounter on 10/18/19   XR CHEST PA LAT    Narrative EXAMINATION: Chest 2 views    INDICATION: Chest pain    COMPARISON: 10/21/2019    FINDINGS: Frontal and lateral views of the chest obtained. Mediastinal  silhouette normal in size. Aortic arch calcifications. Slightly indistinct  perihilar interstitium. Basilar streaky densities. No evidence of pneumothorax. No obvious acute osseous findings. Impression IMPRESSION:    Suspected mild interstitial infiltrate/edema. Minimal basilar streaky densities,  probably atelectasis, less likely infiltrate.        Lab Test:  Date:10/26/2019  WBC:   Lab Results   Component Value Date/Time    WBC 7.6 10/26/2019 03:15 AM   HGB:   Lab Results   Component Value Date/Time    HGB 9.6 (L) 10/26/2019 03:15 AM    PLTS:   Lab Results   Component Value Date/Time    PLATELET 168 (H) 82/26/9786 03:15 AM       SaO2%/flow: @RGSBSHT9(8)@    Vital Signs:     Patient Vitals for the past 8 hrs:   Temp Pulse Resp BP SpO2   10/26/19 1510 97 °F (36.1 °C) 88 15 121/69 99 %   10/26/19 1446 -- -- -- -- 93 %   10/26/19 1103 98.3 °F (36.8 °C) 95 16 114/74 95 %         RA/O2 flow/device: Sp02 92% on RA        First Inital Assesment:     []Yes []No   Reevaluation/Reassessment:    []Yes []No     CHART REVIEW   Points 0 X 1 X 2 X 3 X 4 X Points   Pulmonary History Smoking History (1) none  Recent Smoking History <1 PPD  Recent Smoking History >1 PPD  Pulmonary Disease or Impairment  Severe Pulmonary Disease  2   Surgical History No Surgery  General Surgery  Lower Abdominal  Thoracic or Upper Abdominal  Thoracic & Pulmonary Disease  0   CXR Clear or not indicated  Chronic changes or CXR Pending  Infiltrate, atelectasis or pleural effusions  Infiltrates in more than 1lobe  Infiltrates +atelectasis  +/or pleural effusions  3     PATIENT ASSESSMENT    0 X 1 X 2 X 3 X 4 X Points   Respiratory Pattern Regular pattern RR 12-20  Increased RR 21-27   Mild Dyspnea at rest, irregular pattern RR 28-32  Moderate Dyspnea at rest, Use of accessory muscles, RR 33-36  Severe Dyspnea, Use of accessory muscles RR >36  0   Mental Status Alert Oriented cooperative  Confused, Follows commands  Lethargic, Does not follow commands  Obtunded  Unresponsive  0   Breath Sounds Clear  Decreased Unilaterally  Decreased Bilaterally  Mild Scattered wheezing or Crackles in bases  Severe Wheezing or rhonchi  2   Cough Strong dry NPC  Strong Productive  Weak NPC  Weak productive or weak with rhonchi  No cough or may require suctioning  0   Level of Activity Ambulatory  Ambulatory with assistance  Temporarily Non-ambulatory  Non-Ambulatory, able to position self  Unable to position self, confined to bed  0   Total Points/Score:   7     Specific Intervention Chart()    Bronchial Hygiene/Secretion Clearance:    []EZPAP []Rotation bed with vibration    []CPT with percussor []CPT via vest   []Oscillastiang positive pressure expiratory device      Lung Expansion:    []Incentive Spirometer w/RT visits []Incentive Spirometer w/nursing    []EZPAP      *Suctioning:    []Nasal Tracheal []Tracheal     *suctioning will be ordered and done PRN with an associated frequency such as QID/PRN based on score       Other:    Care Plan   Level # Score Modality Frequency Comment   Level 1 >17 - -    Level 2 14-17 - -    Level 3 10-13 - -    Level 4 1-9 Aerobika BID      BRONCHIAL HYGIENE SCORING AND FREQUENCY GUIDELINES   Frequency Indications/Findings Level #   Q4 ATC Copious secretions, SOB, unable to sleep 1   QID & PRN at night Moderate amounts of secretions 2   TID or Q6 wa Small amounts of secretions and poor cough: recent history of secretions 3   BID or Q8 wa Unable to deep breathe and cough effectively 4        Comments:  Patient has a strong NPC. Aerobika BID with nebs. Continue I/S on her own. Volume - 1250cc with good technique.     Respiratory Therapist: Merari Mallory, RT        2

## 2019-10-27 ENCOUNTER — APPOINTMENT (OUTPATIENT)
Dept: GENERAL RADIOLOGY | Age: 69
DRG: 208 | End: 2019-10-27
Attending: PHYSICIAN ASSISTANT
Payer: MEDICARE

## 2019-10-27 VITALS
OXYGEN SATURATION: 97 % | TEMPERATURE: 97.8 F | BODY MASS INDEX: 36.89 KG/M2 | SYSTOLIC BLOOD PRESSURE: 134 MMHG | HEART RATE: 84 BPM | HEIGHT: 64 IN | DIASTOLIC BLOOD PRESSURE: 65 MMHG | RESPIRATION RATE: 16 BRPM | WEIGHT: 216.1 LBS

## 2019-10-27 LAB
ANION GAP SERPL CALC-SCNC: 7 MMOL/L (ref 3–18)
BASOPHILS # BLD: 0 K/UL (ref 0–0.1)
BASOPHILS NFR BLD: 0 % (ref 0–2)
BUN SERPL-MCNC: 43 MG/DL (ref 7–18)
BUN/CREAT SERPL: 22 (ref 12–20)
CALCIUM SERPL-MCNC: 8.9 MG/DL (ref 8.5–10.1)
CHLORIDE SERPL-SCNC: 106 MMOL/L (ref 100–111)
CO2 SERPL-SCNC: 27 MMOL/L (ref 21–32)
CREAT SERPL-MCNC: 1.99 MG/DL (ref 0.6–1.3)
DIFFERENTIAL METHOD BLD: ABNORMAL
EOSINOPHIL # BLD: 0.4 K/UL (ref 0–0.4)
EOSINOPHIL NFR BLD: 5 % (ref 0–5)
ERYTHROCYTE [DISTWIDTH] IN BLOOD BY AUTOMATED COUNT: 18.9 % (ref 11.6–14.5)
GLUCOSE BLD STRIP.AUTO-MCNC: 113 MG/DL (ref 70–110)
GLUCOSE BLD STRIP.AUTO-MCNC: 132 MG/DL (ref 70–110)
GLUCOSE SERPL-MCNC: 112 MG/DL (ref 74–99)
HCT VFR BLD AUTO: 33.1 % (ref 35–45)
HGB BLD-MCNC: 10 G/DL (ref 12–16)
LYMPHOCYTES # BLD: 3.2 K/UL (ref 0.9–3.6)
LYMPHOCYTES NFR BLD: 36 % (ref 21–52)
MAGNESIUM SERPL-MCNC: 2.1 MG/DL (ref 1.6–2.6)
MCH RBC QN AUTO: 24.9 PG (ref 24–34)
MCHC RBC AUTO-ENTMCNC: 30.2 G/DL (ref 31–37)
MCV RBC AUTO: 82.3 FL (ref 74–97)
MONOCYTES # BLD: 0.9 K/UL (ref 0.05–1.2)
MONOCYTES NFR BLD: 10 % (ref 3–10)
NEUTS SEG # BLD: 4.3 K/UL (ref 1.8–8)
NEUTS SEG NFR BLD: 49 % (ref 40–73)
PLATELET # BLD AUTO: 536 K/UL (ref 135–420)
PMV BLD AUTO: 9.3 FL (ref 9.2–11.8)
POTASSIUM SERPL-SCNC: 4.4 MMOL/L (ref 3.5–5.5)
RBC # BLD AUTO: 4.02 M/UL (ref 4.2–5.3)
SODIUM SERPL-SCNC: 140 MMOL/L (ref 136–145)
WBC # BLD AUTO: 8.8 K/UL (ref 4.6–13.2)

## 2019-10-27 PROCEDURE — 93005 ELECTROCARDIOGRAM TRACING: CPT

## 2019-10-27 PROCEDURE — 74011250637 HC RX REV CODE- 250/637: Performed by: NURSE PRACTITIONER

## 2019-10-27 PROCEDURE — 80048 BASIC METABOLIC PNL TOTAL CA: CPT

## 2019-10-27 PROCEDURE — 94761 N-INVAS EAR/PLS OXIMETRY MLT: CPT

## 2019-10-27 PROCEDURE — 94640 AIRWAY INHALATION TREATMENT: CPT

## 2019-10-27 PROCEDURE — 71045 X-RAY EXAM CHEST 1 VIEW: CPT

## 2019-10-27 PROCEDURE — 36415 COLL VENOUS BLD VENIPUNCTURE: CPT

## 2019-10-27 PROCEDURE — 83735 ASSAY OF MAGNESIUM: CPT

## 2019-10-27 PROCEDURE — 82962 GLUCOSE BLOOD TEST: CPT

## 2019-10-27 PROCEDURE — 74011000250 HC RX REV CODE- 250: Performed by: INTERNAL MEDICINE

## 2019-10-27 PROCEDURE — 85025 COMPLETE CBC W/AUTO DIFF WBC: CPT

## 2019-10-27 PROCEDURE — 74011250637 HC RX REV CODE- 250/637: Performed by: INTERNAL MEDICINE

## 2019-10-27 PROCEDURE — 74011000250 HC RX REV CODE- 250: Performed by: PHYSICIAN ASSISTANT

## 2019-10-27 RX ORDER — PANTOPRAZOLE SODIUM 40 MG/1
40 TABLET, DELAYED RELEASE ORAL DAILY
Qty: 30 TAB | Refills: 0 | Status: ON HOLD | OUTPATIENT
Start: 2019-10-27 | End: 2021-12-18

## 2019-10-27 RX ORDER — TRAMADOL HYDROCHLORIDE 50 MG/1
50 TABLET ORAL
Qty: 20 TAB | Refills: 0 | Status: SHIPPED | OUTPATIENT
Start: 2019-10-27 | End: 2019-11-01

## 2019-10-27 RX ADMIN — Medication 10 ML: at 08:26

## 2019-10-27 RX ADMIN — Medication 81 MG: at 08:24

## 2019-10-27 RX ADMIN — IPRATROPIUM BROMIDE AND ALBUTEROL SULFATE 3 ML: .5; 3 SOLUTION RESPIRATORY (INHALATION) at 07:55

## 2019-10-27 RX ADMIN — BUMETANIDE 1 MG: 1 TABLET ORAL at 08:25

## 2019-10-27 RX ADMIN — Medication 30 ML: at 08:24

## 2019-10-27 RX ADMIN — TRAMADOL HYDROCHLORIDE 50 MG: 50 TABLET ORAL at 08:25

## 2019-10-27 RX ADMIN — BUDESONIDE 500 MCG: 0.5 INHALANT RESPIRATORY (INHALATION) at 07:55

## 2019-10-27 RX ADMIN — PANTOPRAZOLE SODIUM 40 MG: 40 TABLET, DELAYED RELEASE ORAL at 08:24

## 2019-10-27 RX ADMIN — CARVEDILOL 6.25 MG: 12.5 TABLET, FILM COATED ORAL at 08:25

## 2019-10-27 NOTE — PROGRESS NOTES
Discharge order of home noted for today. No needs identified.      Gerardo Wood, KAMLESH  Case Management  [phone]

## 2019-10-27 NOTE — DISCHARGE INSTRUCTIONS
Patient Education        Learning About Heart Failure Zones  What are heart failure zones? Heart failure zones give you an easy way to see changes in your heart failure symptoms. They also tell you when you need to get help. Check every day to see which zone you are in. Green zone. You are doing well. This is where you want to be. · Your weight is stable. This means it is not going up or down. · You breathe easily. · You are sleeping well. You are able to lie flat without shortness of breath. · You can do your usual activities. Yellow zone. Be careful. Your symptoms are changing. Call your doctor. · You have new or increased shortness of breath. · You are dizzy or lightheaded, or you feel like you may faint. · You have sudden weight gain, such as more than 2 to 3 pounds in a day or 5 pounds in a week. (Your doctor may suggest a different range of weight gain.)  · You have increased swelling in your legs, ankles, or feet. · You are so tired or weak that you cannot do your usual activities. · You are not sleeping well. Shortness of breath wakes you up at night. You need extra pillows. Your doctor's name: ____________________________________________________________  Your doctor's contact information: _________________________________________________  Red zone. This is an emergency. Call 911. You have symptoms of sudden heart failure, such as:  · You have severe trouble breathing. · You cough up pink, foamy mucus. · You have a new irregular or fast heartbeat. You have symptoms of a heart attack. These may include:  · Chest pain or pressure, or a strange feeling in the chest.  · Sweating. · Shortness of breath. · Nausea or vomiting. · Pain, pressure, or a strange feeling in the back, neck, jaw, or upper belly or in one or both shoulders or arms. · Lightheadedness or sudden weakness. · A fast or irregular heartbeat.   If you have symptoms of a heart attack: After you call 911, the  may tell you to chew 1 adult-strength or 2 to 4 low-dose aspirin. Wait for an ambulance. Do not try to drive yourself. Follow-up care is a key part of your treatment and safety. Be sure to make and go to all appointments, and call your doctor if you are having problems. It's also a good idea to know your test results and keep a list of the medicines you take. Where can you learn more? Go to http://jason-charity.info/. Enter T174 in the search box to learn more about \"Learning About Heart Failure Zones. \"  Current as of: April 9, 2019  Content Version: 12.2  © 0277-3307 [employer]. Care instructions adapted under license by [employer] (which disclaims liability or warranty for this information). If you have questions about a medical condition or this instruction, always ask your healthcare professional. [place] [address] any warranty or liability for your use of this information. Patient Education        Heart Failure: Care Instructions  Your Care Instructions    Heart failure occurs when your heart does not pump as much blood as the body needs. Failure does not mean that the heart has stopped pumping but rather that it is not pumping as well as it should. Over time, this causes fluid buildup in your lungs and other parts of your body. Fluid buildup can cause shortness of breath, fatigue, swollen ankles, and other problems. By taking medicines regularly, reducing sodium (salt) in your diet, checking your weight every day, and making lifestyle changes, you can feel better and live longer. Follow-up care is a key part of your treatment and safety. Be sure to make and go to all appointments, and call your doctor if you are having problems. It's also a good idea to know your test results and keep a list of the medicines you take. How can you care for yourself at home? Medicines    · Be safe with medicines. Take your medicines exactly as prescribed.  Call your doctor if you think you are having a problem with your medicine.     · Do not take any vitamins, over-the-counter medicine, or herbal products without talking to your doctor first. [employer] not take ibuprofen (Advil or Motrin) and naproxen (Aleve) without talking to your doctor first. They could make your heart failure worse.     · You may take some of the following medicine. ? Angiotensin-converting enzyme inhibitors (ACEIs) or angiotensin II receptor blockers (ARBs) reduce the heart's workload, lower blood pressure, and reduce swelling. Taking an ACEI or ARB may lower your chance of needing to be hospitalized. ? Beta-blockers can slow heart rate, decrease blood pressure, and improve your condition. Taking a beta-blocker may lower your chance of needing to be hospitalized. ? Diuretics, also called water pills, reduce swelling.    You will get more details on the specific medicines your doctor prescribes. Diet    · Your doctor may suggest that you limit sodium. Your doctor can tell you how much sodium is right for you. An example is less than 3,000 mg a day. This includes all the salt you eat in cooking or in packaged foods. People get most of their sodium from processed foods. Fast food and restaurant meals also tend to be very high in sodium.     · Ask your doctor how much liquid you can drink each day. You may have to limit liquids.    Weight    · Weigh yourself without clothing at the same time each day. Record your weight. Call your doctor if you have a sudden weight gain, such as more than 2 to 3 pounds in a day or 5 pounds in a week. (Your doctor may suggest a different range of weight gain.) A sudden weight gain may mean that your heart failure is getting worse.    Activity level    · Start light exercise (if your doctor says it is okay). Even if you can only do a small amount, exercise will help you get stronger, have more energy, and manage your weight and your stress.  Walking is an easy way to get exercise. Start out by walking a little more than you did before. Bit by bit, increase the amount you walk.     · When you exercise, watch for signs that your heart is working too hard. You are pushing yourself too hard if you cannot talk while you are exercising. If you become short of breath or dizzy or have chest pain, stop, sit down, and rest.     · If you feel \"wiped out\" the day after you exercise, walk slower or for a shorter distance until you can work up to a better pace.     · Get enough rest at night. Sleeping with 1 or 2 pillows under your upper body and head may help you breathe easier.    Lifestyle changes    · Do not smoke. Smoking can make a heart condition worse. If you need help quitting, talk to your doctor about stop-smoking programs and medicines. These can increase your chances of quitting for good. Quitting smoking may be the most important step you can take to protect your heart.     · Limit alcohol to 2 drinks a day for men and 1 drink a day for women. Too much alcohol can cause health problems.     · Avoid getting sick from colds and the flu. Get a pneumococcal vaccine shot. If you have had one before, ask your doctor whether you need another dose. Get a flu shot each year. If you must be around people with colds or the flu, wash your hands often. When should you call for help? Call 911 if you have symptoms of sudden heart failure such as:    · You have severe trouble breathing.     · You cough up pink, foamy mucus.     · You have a new irregular or rapid heartbeat.    Call your doctor now or seek immediate medical care if:    · You have new or increased shortness of breath.     · You are dizzy or lightheaded, or you feel like you may faint.     · You have sudden weight gain, such as more than 2 to 3 pounds in a day or 5 pounds in a week.  (Your doctor may suggest a different range of weight gain.)     · You have increased swelling in your legs, ankles, or feet.     · You are suddenly so tired or weak that you cannot do your usual activities.    Watch closely for changes in your health, and be sure to contact your doctor if you develop new symptoms. Where can you learn more? Go to http://jason-charity.info/. Enter Y776 in the search box to learn more about \"Heart Failure: Care Instructions. \"  Current as of: April 9, 2019  Content Version: 12.2  © 7783-2424 [employer]. Care instructions adapted under license by [employer] (which disclaims liability or warranty for this information). If you have questions about a medical condition or this instruction, always ask your healthcare professional. [address] any warranty or liability for your use of this information.

## 2019-10-27 NOTE — PROGRESS NOTES
Patient discharged home with  in stable condition. Has belongings with her. AVS and prescriptions reviewed and provided. Education provided on patient's medical conditions and what to do in case of exacerbation of breathing difficulties. Hospital armband, ivs and telemetry box removed. Signature obtained and placed in chart. Verified with Dr. Max Oseguera per patient request that patient is to continue with lasix for diuresis.

## 2019-10-27 NOTE — PROGRESS NOTES
[facility]      CARDIOLOGY PROGRESS NOTE  RECS:    1. Respiratory Failure s/p Mechanical Ventilation - now extubated   2. Syncope CT Head: No acute findings.   3. Coronary artery disease   · 8/28/19 NST: Left ventricular function post-stress was abnormal. Wall motion was abnormal at stress as described below in the wall scoring diagram. Calculated ejection fraction is 44%. The TID ratio is 0.94. Left ventricular perfusion is abnormal.   Perfusion defect 1: There is a left ventricular perfusion defect that is moderate in size with a severe reduction in uptake affecting the mid-apical anterior and apex location(s) that is non-reversible. There is abnormal wall motion in the defect area. Viability in the area is poor. The defect appears to be infarction. Perfusion defect was visually present without quantitative evidence. · s/p YADIRA to proximal LAD with RESOLUTE stent 5/20/2018.  Acute stent thrombosis shortly after initial PCI on 5/20/2018 requiring emergency intubation for pulmonary edema and repeat cardiac cath and additional PCI/YADIRA to prox LAD.   · Patent stent by cath 10/2019.    4.  Chronic Heart failure with mild systolic dysfunction exacerbation            -improved very well clinically.   Repeat echo with EF 46%. No    recurrent dyspnea overnight.   Per nephrology - may use         Lasix PRN.     5.   Hx of Ischemic cardiomyopathy- with EF 35% on echo  5/21/2018  with    elevated pulmonary pressures at 44mm Hg- now   with mild systolic   Dysfunction Recent Echo 8/7/19 showed  EF% 45%-50%.    6.  Hypertension- stable  on Entresto and bb   7.  DM Type II- medications per medical team   8.  Dyslipidemia- on Lipitor 20 mg. LDL 80 on 8/19   9.  COPD exacerbation- managed by medical team   10. Hx of Tobacco abuse with   1 ppd X 50 years- patient quit in 2018  11. Obesity- weight loss advised  12. TESSA- uses CPAP at night   13. Urinary tract infection  14.  CARA on CKD - renal function improving. 15. Chest pain - reproducible to palpation. Cardiac cath with patent stent - further evaluation per primary team. Abdominal U/S without acute findings -Cholelithiasis without evidence of cholecystitis .        Cardiac cath 10/25/2019  Conclusion      Patent stent in proximal LAD. Mildly reduced LV function. Continue intense risk factor modification. Complications      Complications documented before study signed (10/25/2019  1:50 PM EDT)       No complications were associated with this study. Documented by Miles Shin MD - 10/25/2019  1:50 PM EDT      Coronary Findings      Diagnostic   Dominance: Right   Left Main   The vessel was visualized by angiography. The vessel is angiographically normal.   Left Anterior Descending   Previously placed Ost LAD to Prox LAD stent (unknown type) is widely patent. First Diagonal Branch   Ost 1st Diag lesion 50% stenosed. .   Left Circumflex   The vessel exhibits minimal luminal irregularities. Right Coronary Artery   The vessel exhibits minimal luminal irregularities. Intervention      No interventions have been documented.      10/19/19 Echocardiogram:     · Left Ventricle: Mildly dilated left ventricle. Mild concentric hypertrophy. Mild-to-moderate systolic dysfunction. Calculated left ventricular ejection fraction is 46%. Biplane method used to measure ejection fraction. · Mitral Valve: Mitral valve thickening. Mitral annular calcification. Moderate mitral valve regurgitation is present. The mitral regurgitant jet is eccentric. · Interatrial Septum: Agitated saline contrast study was performed. No obvious septal defect.        ASSESSMENT:  Hospital Problems  Date Reviewed: 10/11/2019          Codes Class Noted POA    Pulmonary edema cardiac cause ([facility] [address] ICD-10-CM: I50.1  ICD-9-CM: 997  10/19/2019 Unknown        COPD with acute exacerbation ([facility] [address] ICD-10-CM: J44.1  ICD-9-CM: 491.21  10/19/2019 Unknown        Respiratory failure requiring intubation [facility]) ICD-10-CM: J96.90  ICD-9-CM: 518.81  10/19/2019 Unknown        Syncope ICD-10-CM: R55  ICD-9-CM: 780.2  10/18/2019 Unknown        Flash pulmonary edema ([address] ICD-10-CM: J81.0  ICD-9-CM: 518.4  10/18/2019 Unknown        Elevated d-dimer ICD-10-CM: R79.89  ICD-9-CM: 790.92  10/18/2019 Unknown        * (Principal) Acute on chronic respiratory failure with hypoxia (HCC) ICD-10-CM: J96.21  ICD-9-CM: 518.84, 799.02  10/18/2019 Unknown        CAD (coronary artery disease) ICD-10-CM: I25.10  ICD-9-CM: 414.00  10/18/2019     Overview Signed 10/24/2019 10:43 AM by Zohra Spain RN     Added automatically from request for surgery [id number]             COPD exacerbation [facility]) ICD-10-CM: J44.1  ICD-9-CM: 491.21  8/25/2019 Unknown                SUBJECTIVE:  C/O reproducible chest pain   C/O SOB with exertion. OBJECTIVE:    VS:   Visit Vitals  /85 (BP 1 Location: Left arm, BP Patient Position: Sitting)   Pulse 89   Temp 99.1 °F (37.3 °C)   Resp 16   Ht 5' 4\" (1.626 m)   Wt 98 kg (216 lb 1.6 oz)   SpO2 98%   Breastfeeding? No   BMI 37.09 kg/m²         Intake/Output Summary (Last 24 hours) at 10/27/2019 0839  Last data filed at 10/27/2019 0641  Gross per 24 hour   Intake 480 ml   Output 1350 ml   Net -870 ml     TELE: normal sinus rhythm    General: alert and in no apparent distress  HENT: Normocephalic, atraumatic. Normal external eye.   Neck :  JVD difficult to assess due to obesity  Cardiac:  regular rate and rhythm, S1, S2 normal, no murmur, click, rub or gallop + painful to palpation  Lungs: clear to auscultation bilaterally  Abdomen: Soft,+ painful to palpation , no masses  Extremities:  No c/c/e, peripheral pulses present      Labs: Results:       Chemistry Recent Labs     10/27/19  0040 10/26/19  0315 10/25/19  0344   * 111* 103*    140 138   K 4.4 4.4 4.2    106 105   CO2 27 27 27   BUN 43* 38* 40*   CREA 1.99* 1.73* 1.59*   CA 8.9 8.8 8.5   AGAP 7 7 6   BUCR 22* 22* 25*   AP --  105  --    TP  --  6.3*  --    ALB  --  3.2*  --    GLOB  --  3.1  --    AGRAT  --  1.0  --       CBC w/Diff Recent Labs     10/27/19  0040 10/26/19  0315 10/25/19  0344   WBC 8.8 7.6 8.4   RBC 4.02* 3.92* 3.87*   HGB 10.0* 9.6* 9.4*   HCT 33.1* 32.3* 31.6*   * 500* 482*   GRANS 49 49 51   LYMPH 36 35 36   EOS 5 4 2      Cardiac Enzymes No results for input(s): CPK, CKND1, GODFREY in the last 72 hours. No lab exists for component: CKRMB, TROIP   Coagulation No results for input(s): PTP, INR, APTT, INREXT in the last 72 hours. Lipid Panel Lab Results   Component Value Date/Time    Cholesterol, total 152 10/10/2019 12:00 AM    HDL Cholesterol 46 10/10/2019 12:00 AM    LDL, calculated 86 10/10/2019 12:00 AM    VLDL, calculated 20 10/10/2019 12:00 AM    Triglyceride 101 10/10/2019 12:00 AM    CHOL/HDL Ratio 5.6 (H) 05/21/2018 06:23 AM      BNP No results for input(s): BNPP in the last 72 hours. Liver Enzymes Recent Labs     10/26/19  0315   TP 6.3*   ALB 3.2*      SGOT 8*      Thyroid Studies Lab Results   Component Value Date/Time    TSH 0.47 10/19/2019 08:40 PM              Wade Brown NP   Pager # [phone]    I have independently evaluated taken history and examined the patient. All relevant labs and testing data's are reviewed. Care plan discussed and updated after review.   Ag Grey MD

## 2019-10-27 NOTE — PROGRESS NOTES
10/27/19 0819   Vitals   Temp 99.1 °F (37.3 °C)   Temp Source Oral   Pulse (Heart Rate) 89   Heart Rate Source Monitor   Resp Rate 16   O2 Sat (%) 98 %   Level of Consciousness Alert   /85   MAP (Calculated) 101   BP 1 Location Left arm   BP 1 Method Automatic   BP Patient Position Sitting   MEWS Score 1    Patient states chest pain is now sharp and is also now in abdomen and is sharp there too. Pain is 10/10, which chest pain has been 10/10. Vitals taken. STAT EKG ordered per protocol. Ultram has not been given since yesterday at 1100 and has now been given. Patient appears calm, is eating and watching tv. Divina Trejo NP aware and saw EKG. Dr. Cristino Beyer aware.

## 2019-10-27 NOTE — PROGRESS NOTES
Patient insistent that she needs supplemental oxygen for stairs in her house. Ambulated again on unit for over 6 minutes and when oxygen sat got close to 95%, patient got frustrated and wanted to stop. Ambulated up one full flight of stairs (from first floor to second floor) and when reached the top, oxygen saturation was 100%. RN explained that one can feel short of breath but still have good oxygen perfusion, and in this case, supplemental oxygen is not the treatment. Patient upset and states nobody understands. Now resting in her bed with boyfriend at bedside.

## 2019-10-27 NOTE — ROUTINE PROCESS
Bedside and Verbal shift change report given to [address] (oncoming nurse) by Rafy Justice (offgoing nurse). Report included the following information SBAR, Kardex, [facility] and Cardiac Rhythm SR. Patient awake on rounds no complaints voiced, call light in reach.

## 2019-10-27 NOTE — DISCHARGE SUMMARY
Discharge Summary      Patient: Haleigh Pulido MRN: [id number]  CSN: [id number]    YOB: 1950  Age: 81 y.o.   Sex: female    DOA: 10/18/2019 LOS:  LOS: 9 days   Discharge Date: 10/27/19     Admission Diagnoses: Syncope [R55]  COPD exacerbation ([facility] [J44.1]  Acute on chronic respiratory failure with hypoxia ([facility] [J96.21]  Flash pulmonary edema (HCC) [J81.0]  Elevated d-dimer [R79.89]  COPD with acute exacerbation (HCC) [J44.1]  Respiratory failure requiring intubation ([facility] [J96.90]  Pulmonary edema cardiac cause ([facility] [I50.1]    Discharge Diagnoses:    Problem List as of 10/27/2019 Date Reviewed: 10/11/2019          Codes Class Noted - Resolved        COPD with acute exacerbation ([facility] ICD-10-CM: J44.1  ICD-9-CM: 491.21  10/19/2019 - Present        Respiratory failure requiring intubation ([facility] ICD-10-CM: J96.90  ICD-9-CM: 518.81  10/19/2019 - Present        Syncope ICD-10-CM: R55  ICD-9-CM: 780.2  10/18/2019 - Present        Flash pulmonary edema ([facility] ICD-10-CM: J81.0  ICD-9-CM: 518.4  10/18/2019 - Present        Elevated d-dimer ICD-10-CM: R79.89  ICD-9-CM: 790.92  10/18/2019 - Present        * (Principal) Acute on chronic respiratory failure with hypoxia ([facility] ICD-10-CM: J96.21  ICD-9-CM: 518.84, 799.02  10/18/2019 - Present        CAD (coronary artery disease) ICD-10-CM: I25.10  ICD-9-CM: 414.00  10/18/2019 - Present    Overview Signed 10/24/2019 10:43 AM by Morena Pro RN     Added automatically from request for surgery [id number]             Ischemic cardiomyopathy ICD-10-CM: I25.5  ICD-9-CM: 414.8  9/9/2019 - Present        CAP (community acquired pneumonia) ICD-10-CM: J18.9  ICD-9-CM: 486  8/26/2019 - Present        Combined systolic and diastolic congestive heart failure (HCC) ICD-10-CM: I50.40  ICD-9-CM: 428.40  8/26/2019 - Present            TESSA on CPAP ICD-10-CM: G47.33, Z99.89  ICD-9-CM: 327.23, V46.8  8/26/2019 - Present            Type 2 diabetes with nephropathy ([facility] ICD-10-CM: E11.21  ICD-9-CM: 250.40, 583.81  7/12/2018 - Present        Severe obesity (BMI 35.0-39. 9) with comorbidity ([facility] ICD-10-CM: E66.01  ICD-9-CM: 278.01  7/12/2018 - Present        Chest pain ICD-10-CM: R07.9  ICD-9-CM: 786.50  6/9/2018 - Present    Coronary artery disease involving native coronary artery with unstable angina pectoris ([facility] ICD-10-CM: I25.110  ICD-9-CM: 414.01, 411.1  6/9/2018 - Present        Pulmonary edema ICD-10-CM: J81.1  ICD-9-CM: 811  6/8/2018 - Present        Coronary artery disease involving native coronary artery of native heart without angina pectoris ICD-10-CM: I25.10  ICD-9-CM: 414.01  5/31/2018 - Present    Chronic obstructive pulmonary disease ([facility] ICD-10-CM: J44.9  ICD-9-CM: 496  5/17/2016 - Present        Chronic pain syndrome ICD-10-CM: G89.4  ICD-9-CM: 338.4  4/13/2016 - Present        Primary osteoarthritis of both knees ICD-10-CM: M17.0  ICD-9-CM: 715.16  4/13/2016 - Present        Iron deficiency anemia ICD-10-CM: D50.9  ICD-9-CM: 280.9  10/22/2015 - Present        Decreased GFR ICD-10-CM: R94.4  ICD-9-CM: 794.4  10/22/2015 - Present    Essential hypertension ICD-10-CM: I10  ICD-9-CM: 401.9  9/17/2015 - Present        COPD (chronic obstructive pulmonary disease) ([facility] ICD-10-CM: J44.9  ICD-9-CM: 859  9/1/2015 - Present    Overview Signed 9/11/2015  5:45 PM by Harish Jain NP     mild-mod dz; Dr Chip Pham             Diabetes mellitus type 2, insulin dependent ([facility] ICD-10-CM: E11.9, Z79.4  ICD-9-CM: 250.00, V58.67  7/2/2015 - Present    Cardiomyopathy, dilated ([facility] ICD-10-CM: I42.0  ICD-9-CM: 425.4  12/31/2014 - Present             Discharge Condition: Stable    PHYSICAL EXAM  Visit Vitals  /65 (BP 1 Location: Left arm, BP Patient Position: At rest;Head of bed elevated (Comment degrees))   Pulse 84   Temp 97.8 °F (36.6 °C)   Resp 16   Ht 5' 4\" (1.626 m)   Wt 98 kg (216 lb 1.6 oz)   SpO2 97%   Breastfeeding?  No   BMI 37.09 kg/m²       General:  Laying in bed, Alert, NAD  Cardiovascular:  RRR, no murmurs, reproducible chest pain with palpation. Pulmonary:  On room air, speaking in complete sentences, respiratory effort WNL, clear to auscultation in anterior lung fields  GI:  +BS in all four quadrants, soft, non-tender  Extremities:  No edema; 2+ dorsalis pedis pulses bilaterally  Neuro: alert and oriented x3    Hospital Course:  Patient is a 81 y.o. female with a PMHx of CAD, CMO, COPD, DM, HTN, CKD Stage IIIb, and HLD who presented to [facility] ED with c/o SOB for 1 week. Shortly after arriving respiratory distress developed and resulted in respiratory arrest. She was intubated and [facility] was consulted for ICU admission and MV management. A CXR showed bilateral infiltrates and a CTA confirmed no PE, but did show bilateral lung consolidation c/w PNA with fluid overload. Pt was then transferred to [facility] ICU on IV steroids and antibiotics, sedated and intubated. She was admitted with acute hypoxic respiratory failure 2/2 pulmonary edema and infiltrates 2/2 CHF exacerbation with PNA and COPD components contributing. Pt was managed accordingly in the ICU overnight and the next morning a SBT was successful. Extubation was successful but afterwards she became delirious/encephalopathic. Cardiology and nephrology were consulted. An echo showed an EF of 46% and other findings described below. Ms. Davin Wood continue to complain of SOB and was placed on BiPAP. She responded very well to IV lasix and had urine output of about 2.5L. She was then transferred to the floor. Soon after being downgraded, Ms. Davin Wood was found in acute respiratory distress w/rales, wheezing and hypoxia. A CXR showed findings reflective of CHF/volume overload. She was given IV lasix and placed back on Bi-PAP and transferred to stepdown. Ms. Prakash Gutierrez respiratory status improved, but she continued to complain of chest pain and SOB.  Because of this and her episodes of pulmonary edema, she was taken for a heart cath over concern for angina on 10/25. Her previous stent to proximal LAD was found to be patent. Over the next couple days Ms. Yin's respiratory status remained stable yet she continued to complain of chest pain, R sided and substernal, and upper abdominal/epigastric pain. An abdominal US was done and was unrevealing as to a cause for her pain. Ultimately, her continued chest pain was attributed to musculoskeletal pain, post-intubation pain, with a likely component of GERD. Her pain was treated successfully with Tizanidine and Tramadol. Over the last 2 days of her admission, Ms. Yin's respiratory and clinical status remained stable and improved. She was then discharged in stable condition to home with instructions to follow-up with her PCP, nephrology and cardiology. Consults:   Critical Care/Pulmonology- Dr. Dash Zimmer MD   Cardiology- Dr. Savage Snider MD, Dr. Francia Marie MD   Nephrology- Dr. Yvette Mehta MD     Significant Diagnostic Studies:     CT Head 10/18/19: No acute findings. Volume loss greater than expected for the patient's age of 81. Moderate degree of periventricular and beyond ischemic white matter change. CTA Chest 10/18/19: 1. Negative for acute PE.  -Enlarged main pulmonary artery as can be seen with pulmonary arterial  hypertension; appearance as before  2. Ill-defined bilateral lung consolidation, likely pneumonia. There is an  element of fluid overload with lung edema as well as small pleural effusions. Echo 10/19/19: Left Ventricle: Mildly dilated left ventricle. Mild concentric hypertrophy. Mild-to-moderate systolic dysfunction. Calculated left ventricular ejection fraction is 46%. Biplane method used to measure ejection fraction. Mitral Valve: Mitral valve thickening. Mitral annular calcification. Moderate mitral valve regurgitation is present. The mitral regurgitant jet is eccentric. Interatrial Septum: Agitated saline contrast study was performed.  No obvious septal defect. Bilateral Carotid US 10/21/19: Bilateral mild internal carotid artery stenosis is noted. No evidence of hemodynamically significant arterial disease is identified within bilateral vertebral and subclavian arteries    Cardiac cath 10/25/19: Conclusion: Patent stent in proximal LAD. Mildly reduced LV function. Continue intense risk factor modification. Abdominal US 10/26/19: Cholelithiasis without evidence of cholecystitis. Slight coarsened hepatic echotexture suggests nonspecific hepatocellular  disease. Small right renal cyst.  No other significant findings. Exam limitations described above          Discharge Medications:  Current Discharge Medication List      START taking these medications    Details   traMADol (ULTRAM) 50 mg tablet Take 1 Tab by mouth every six (6) hours as needed for Pain for up to 5 days. Max Daily Amount: 200 mg. Qty: 20 Tab, Refills: 0    Associated Diagnoses: Chronic pain syndrome; Intercostal pain      pantoprazole (PROTONIX) 40 mg tablet Take 1 Tab by mouth daily. Qty: 30 Tab, Refills: 0         CONTINUE these medications which have NOT CHANGED    Details   tizanidine HCl (TIZANIDINE PO) Take  by mouth. furosemide (LASIX) 40 mg tablet Take 1 Tab by mouth two (2) times a day. Qty: 30 Tab, Refills: 0    Associated Diagnoses: Acute on chronic systolic congestive heart failure (HCC)      carvedilol (COREG) 6.25 mg tablet Take 1 Tab by mouth two (2) times daily (with meals). Qty: 60 Tab, Refills: 2    Associated Diagnoses: Essential hypertension      albuterol (PROVENTIL HFA, VENTOLIN HFA, PROAIR HFA) 90 mcg/actuation inhaler Take 2 Puffs by inhalation every four (4) hours as needed for Wheezing. Qty: 1 Inhaler, Refills: 0      sacubitril-valsartan (ENTRESTO) 97 mg/103 mg tablet Take 1 Tab by mouth two (2) times a day.   Qty: 60 Tab, Refills: 6    Associated Diagnoses: Chronic systolic congestive heart failure (HCC)      aspirin 81 mg chewable tablet Take 2 Tabs by mouth daily. Qty: 60 Tab, Refills: 0    Associated Diagnoses: Coronary artery disease involving native coronary artery of native heart without angina pectoris      raNITIdine (ZANTAC) 150 mg tablet Take 1 Tab by mouth two (2) times a day. Qty: 60 Tab, Refills: 4      atorvastatin (LIPITOR) 20 mg tablet Take 1 Tab by mouth nightly. Qty: 30 Tab, Refills: 0      insulin detemir (LEVEMIR) 100 unit/mL injection 15 units daily for diabetes  Qty: 2 Vial, Refills: 0    Associated Diagnoses: Diabetes mellitus type 2, insulin dependent (HCC)      desloratadine (CLARINEX) 5 mg tablet Take 1 Tab by mouth daily as needed for Allergies. Qty: 30 Tab, Refills: 0    Associated Diagnoses: Environmental allergies      mometasone (NASONEX) 50 mcg/actuation nasal spray 2 Sprays by Both Nostrils route daily as needed. For allergies  Qty: 3 Container, Refills: 3    Associated Diagnoses: Environmental allergies      umeclidinium-vilanterol (ANORO ELLIPTA) 62.5-25 mcg/actuation inhaler Take 1 Puff by inhalation daily. For COPD  Qty: 3 Inhaler, Refills: 3    Comments: 9/22/15 Dr Terry Lowe prescribed. Will order via [facility] if available  Associated Diagnoses: Chronic obstructive pulmonary disease, unspecified COPD type (HCC)      ferrous sulfate 325 mg (65 mg iron) tablet Take 1 Tab by mouth two (2) times a day.  Indications: IRON DEFICIENCY ANEMIA  Qty: 60 Tab, Refills: 11    Associated Diagnoses: Iron deficiency anemia              Activity: activity as tolerated, home PT ordered     Diet: cardiac         Follow-up Information     Follow up With Specialties Details Why Melida Lin MD Family Practice Go in 2 days hospital follow-up  [address] [facility]      Beau Collado MD Nephrology Schedule an appointment as soon as possible for a visit in 3 weeks follow-up  [address] [facility]      Sharyn Lay MD Cardiology Schedule an appointment as soon as possible for a visit in 1 week follow-up  [address] [place] [address] [id number]  [phone]             Minutes spent on discharge: >30 minutes spent coordinating this discharge (review instructions/follow-up, prescriptions, preparing report for sign off)          SY Malave DO   October 27, 2019

## 2019-10-28 LAB
ATRIAL RATE: 89 BPM
CALCULATED P AXIS, ECG09: 63 DEGREES
CALCULATED R AXIS, ECG10: 17 DEGREES
CALCULATED T AXIS, ECG11: 84 DEGREES
DIAGNOSIS, 93000: NORMAL
P-R INTERVAL, ECG05: 178 MS
Q-T INTERVAL, ECG07: 386 MS
QRS DURATION, ECG06: 90 MS
QTC CALCULATION (BEZET), ECG08: 469 MS
VENTRICULAR RATE, ECG03: 89 BPM

## 2019-11-07 ENCOUNTER — OFFICE VISIT (OUTPATIENT)
Dept: CARDIOLOGY CLINIC | Age: 69
End: 2019-11-07

## 2019-11-07 VITALS
HEART RATE: 80 BPM | WEIGHT: 223.6 LBS | SYSTOLIC BLOOD PRESSURE: 162 MMHG | HEIGHT: 64 IN | BODY MASS INDEX: 38.17 KG/M2 | DIASTOLIC BLOOD PRESSURE: 73 MMHG

## 2019-11-07 DIAGNOSIS — E78.5 DYSLIPIDEMIA, GOAL LDL BELOW 70: ICD-10-CM

## 2019-11-07 DIAGNOSIS — I50.22 CHRONIC SYSTOLIC CONGESTIVE HEART FAILURE (HCC): Primary | ICD-10-CM

## 2019-11-07 DIAGNOSIS — E66.01 SEVERE OBESITY (BMI 35.0-39.9) WITH COMORBIDITY (HCC): ICD-10-CM

## 2019-11-07 DIAGNOSIS — I10 ESSENTIAL HYPERTENSION: ICD-10-CM

## 2019-11-07 DIAGNOSIS — I25.10 CORONARY ARTERY DISEASE INVOLVING NATIVE CORONARY ARTERY OF NATIVE HEART WITHOUT ANGINA PECTORIS: ICD-10-CM

## 2019-11-07 DIAGNOSIS — I25.5 ISCHEMIC CARDIOMYOPATHY: ICD-10-CM

## 2019-11-07 RX ORDER — TRAMADOL HYDROCHLORIDE 50 MG/1
50 TABLET ORAL
COMMUNITY
End: 2019-12-08

## 2019-11-07 NOTE — PATIENT INSTRUCTIONS
There are no discontinued medications. After the recommended changes have been made in blood pressure medicines, patient advised to keep BP/HR(pulse rate) chart twice daily and bring us results in next 2 weeks or so. Patient may send the results via \"My Chart\" if desired. Please rest for 5-10 minutes before checking blood pressure       Avoiding Triggers With Heart Failure: Care Instructions  Your Care Instructions    Triggers are anything that make your heart failure flare up. A flare-up is also called \"sudden heart failure\" or \"acute heart failure. \" When you have a flare-up, fluid builds up in your lungs, and you have problems breathing. You might need to go to the hospital. By watching for changes in your condition and avoiding triggers, you can prevent heart failure flare-ups. Follow-up care is a key part of your treatment and safety. Be sure to make and go to all appointments, and call your doctor if you are having problems. It's also a good idea to know your test results and keep a list of the medicines you take. How can you care for yourself at home? Watch for changes in your weight and condition  · Weigh yourself without clothing at the same time each day. Record your weight. Call your doctor if you have sudden weight gain, such as more than 2 to 3 pounds in a day or 5 pounds in a week. (Your doctor may suggest a different range of weight gain.) A sudden weight gain may mean that your heart failure is getting worse. · Keep a daily record of your symptoms. Write down any changes in how you feel, such as new shortness of breath, cough, or problems eating. Also record if your ankles are more swollen than usual and if you feel more tired than usual. Note anything that you ate or did that could have triggered these changes. Limit sodium  Sodium causes your body to hold on to extra water. This may cause your heart failure symptoms to get worse. People get most of their sodium from processed foods.  Fast food and restaurant meals also tend to be very high in sodium. · Your doctor may suggest that you limit sodium to 2,000 milligrams (mg) a day or less. That is less than 1 teaspoon of salt a day, including all the salt you eat in cooking or in packaged foods. · Read food labels on cans and food packages. They tell you how much sodium you get in one serving. Check the serving size. If you eat more than one serving, you are getting more sodium. · Be aware that sodium can come in forms other than salt, including monosodium glutamate (MSG), sodium citrate, and sodium bicarbonate (baking soda). MSG is often added to Asian food. You can sometimes ask for food without MSG or salt. · Slowly reducing salt will help you adjust to the taste. Take the salt shaker off the table. · Flavor your food with garlic, lemon juice, onion, vinegar, herbs, and spices instead of salt. Do not use soy sauce, steak sauce, onion salt, garlic salt, mustard, or ketchup on your food, unless it is labeled \"low-sodium\" or \"low-salt. \"  · Make your own salad dressings, sauces, and ketchup without adding salt. · Use fresh or frozen ingredients, instead of canned ones, whenever you can. Choose low-sodium canned goods. · Eat less processed food and food from restaurants, including fast food. Exercise as directed  Moderate, regular exercise is very good for your heart. It improves your blood flow and helps control your weight. But too much exercise can stress your heart and cause a heart failure flare-up. · Check with your doctor before you start an exercise program.  · Walking is an easy way to get exercise. Start out slowly. Gradually increase the length and pace of your walk. Swimming, riding a bike, and using a treadmill are also good forms of exercise. · When you exercise, watch for signs that your heart is working too hard. You are pushing yourself too hard if you cannot talk while you are exercising.  If you become short of breath or dizzy or have chest pain, stop, sit down, and rest.  · Do not exercise when you do not feel well. Take medicines correctly  · Take your medicines exactly as prescribed. Call your doctor if you think you are having a problem with your medicine. · Make a list of all the medicines you take. Include those prescribed to you by other doctors and any over-the-counter medicines, vitamins, or supplements you take. Take this list with you when you go to any doctor. · Take your medicines at the same time every day. It may help you to post a list of all the medicines you take every day and what time of day you take them. · Make taking your medicine as simple as you can. Plan times to take your medicines when you are doing other things, such as eating a meal or getting ready for bed. This will make it easier to remember to take your medicines. · Get organized. Use helpful tools, such as daily or weekly pill containers. When should you call for help? Call 911 if you have symptoms of sudden heart failure such as:    · You have severe trouble breathing.     · You cough up pink, foamy mucus.     · You have a new irregular or rapid heartbeat.    Call your doctor now or seek immediate medical care if:    · You have new or increased shortness of breath.     · You are dizzy or lightheaded, or you feel like you may faint.     · You have sudden weight gain, such as more than 2 to 3 pounds in a day or 5 pounds in a week. (Your doctor may suggest a different range of weight gain.)     · You have increased swelling in your legs, ankles, or feet.     · You are suddenly so tired or weak that you cannot do your usual activities.    Watch closely for changes in your health, and be sure to contact your doctor if you develop new symptoms. Where can you learn more? Go to http://jason-charity.info/. Enter A962 in the search box to learn more about \"Avoiding Triggers With Heart Failure: Care Instructions. \"  Current as of: April 9, 2019  Content Version: 12.2  © 2429-4398 [employer]. Care instructions adapted under license by [employer] (which disclaims liability or warranty for this information). If you have questions about a medical condition or this instruction, always ask your healthcare professional. [address] any warranty or liability for your use of this information.

## 2019-11-07 NOTE — PROGRESS NOTES
HISTORY OF PRESENT ILLNESS  Bobby Adams is a 81 y.o. female. History of anterior wall MI complicated by cardiac arrest due to acute stent closure requiring repeat PCI to her LAD with 2 YADIRA 5/2018        Hypertension   The history is provided by the patient and medical records. This is a chronic problem. The current episode started more than 1 week ago. Associated symptoms include shortness of breath. Pertinent negatives include no chest pain and no headaches. Cholesterol Problem   The history is provided by the medical records. This is a chronic problem. Associated symptoms include shortness of breath. Pertinent negatives include no chest pain and no headaches. Cardiomyopathy   The history is provided by the patient and medical records. This is a chronic problem. The current episode started more than 1 week ago. The problem occurs constantly. The problem has not changed since onset. Associated symptoms include shortness of breath. Pertinent negatives include no chest pain and no headaches. The symptoms are aggravated by exertion. The symptoms are relieved by rest.   Shortness of Breath   The history is provided by the patient. This is a chronic problem. The problem occurs intermittently. The current episode started more than 1 week ago. Pertinent negatives include no fever, no headaches, no cough, no wheezing, no PND, no orthopnea, no chest pain, no vomiting, no rash, no leg swelling and no claudication. The problem's precipitants include exercise (1 flight; 10/19 50 ft). Hospital Follow Up   Associated symptoms include shortness of breath. Pertinent negatives include no chest pain and no headaches. CHF   Associated symptoms include shortness of breath. Pertinent negatives include no chest pain and no headaches. Review of Systems   Constitutional: Negative for chills, fever, malaise/fatigue and weight loss. HENT: Negative for nosebleeds. Eyes: Negative for discharge.    Respiratory: Positive for shortness of breath. Negative for cough and wheezing. Cardiovascular: Negative for chest pain, palpitations, orthopnea, claudication, leg swelling and PND. Gastrointestinal: Negative for diarrhea, nausea and vomiting. Genitourinary: Negative for dysuria and hematuria. Musculoskeletal: Negative for falls and joint pain. Skin: Negative for rash. Neurological: Negative for dizziness, seizures, loss of consciousness and headaches. Endo/Heme/Allergies: Negative for polydipsia. Does not bruise/bleed easily. Psychiatric/Behavioral: Negative for depression and substance abuse. The patient does not have insomnia. No Known Allergies    Past Medical History:   Diagnosis Date    Abnormal WBC count 2016    Bilateral shoulder pain 2016    Chondromalacia of both patellae     COPD (chronic obstructive pulmonary disease) ([facility] [address] 2015    mild-mod dz; Dr Dalia Velez    Diabetes [facility])     Diabetic eye exam ([facility] [address]     Dilated cardiomyopathy ([facility] [address]     Dyslipidemia     Gout     Heart attack ([facility] [address]     Heart attack ([facility] [address]     History of echocardiogram 2014    Mild LVE. EF 40%. Mild, diffuse hypk. Mild LAE. Mild MR.      Hypercholesteremia 14    Hypertension 2000    Noncompliance with medications 2016    Obesity     Orthostatic hypotension 2016    Osteoarthritis of both knees     Pain management 2016    Dr. Garcia Wise Popliteal cyst, bilateral      Vitamin D deficiency 10/16/14       Family History   Problem Relation Age of Onset    Diabetes Mother     Hypertension Mother     Hypertension Father     Kidney Disease Maternal Aunt 48        Dialysis       Social History     Tobacco Use    Smoking status: Former Smoker     Packs/day: 0.25     Years: 48.00     Pack years: 12.00     Types: Cigarettes     Last attempt to quit: 2018     Years since quittin.4    Smokeless tobacco: Never Used   Substance Use Topics    Alcohol use:  No Alcohol/week: 0.0 standard drinks    Drug use: No        Current Outpatient Medications   Medication Sig    traMADol (ULTRAM) 50 mg tablet Take 50 mg by mouth every six (6) hours as needed for Pain.  pantoprazole (PROTONIX) 40 mg tablet Take 1 Tab by mouth daily.  tizanidine HCl (TIZANIDINE PO) Take  by mouth.  furosemide (LASIX) 40 mg tablet Take 1 Tab by mouth two (2) times a day.  carvedilol (COREG) 6.25 mg tablet Take 1 Tab by mouth two (2) times daily (with meals).  albuterol (PROVENTIL HFA, VENTOLIN HFA, PROAIR HFA) 90 mcg/actuation inhaler Take 2 Puffs by inhalation every four (4) hours as needed for Wheezing.  sacubitril-valsartan (ENTRESTO) 97 mg/103 mg tablet Take 1 Tab by mouth two (2) times a day.  aspirin 81 mg chewable tablet Take 2 Tabs by mouth daily.  raNITIdine (ZANTAC) 150 mg tablet Take 1 Tab by mouth two (2) times a day.  atorvastatin (LIPITOR) 20 mg tablet Take 1 Tab by mouth nightly. (Patient taking differently: Take 20 mg by mouth two (2) times a day.)    insulin detemir (LEVEMIR) 100 unit/mL injection 15 units daily for diabetes    desloratadine (CLARINEX) 5 mg tablet Take 1 Tab by mouth daily as needed for Allergies.  mometasone (NASONEX) 50 mcg/actuation nasal spray 2 Sprays by Both Nostrils route daily as needed. For allergies    umeclidinium-vilanterol (ANORO ELLIPTA) 62.5-25 mcg/actuation inhaler Take 1 Puff by inhalation daily. For COPD    ferrous sulfate 325 mg (65 mg iron) tablet Take 1 Tab by mouth two (2) times a day. Indications: IRON DEFICIENCY ANEMIA     No current facility-administered medications for this visit.          Past Surgical History:   Procedure Laterality Date    HX HEART CATHETERIZATION      HX TUBAL LIGATION         Visit Vitals  /73   Pulse 80   Ht 5' 4\" (1.626 m)   Wt 101.4 kg (223 lb 9.6 oz)   BMI 38.38 kg/m²       Diagnostic Studies:  I have reviewed the relevant tests done on the patient and show as follows  EKG tracings reviewed by me today. EKG Results     None        XR Results (most recent):  Results from Hospital Encounter encounter on 10/18/19   XR CHEST PA LAT    Narrative EXAMINATION: Chest 2 views    INDICATION: Chest pain    COMPARISON: 10/21/2019    FINDINGS: Frontal and lateral views of the chest obtained. Mediastinal  silhouette normal in size. Aortic arch calcifications. Slightly indistinct  perihilar interstitium. Basilar streaky densities. No evidence of pneumothorax. No obvious acute osseous findings. Impression IMPRESSION:    Suspected mild interstitial infiltrate/edema. Minimal basilar streaky densities,  probably atelectasis, less likely infiltrate. 08/07/19   ECHO ADULT COMPLETE 08/07/2019 8/7/2019    Narrative · Left Ventricle: Normal cavity size and wall thickness. Mild systolic   dysfunction. Estimated left ventricular ejection fraction is 46 - 50%. Abnormal left ventricular wall motion. Moderate (grade 2) left ventricular   diastolic dysfunction. · Left Atrium: Moderately dilated left atrium. · Aortic Valve: Aortic valve sclerosis. · Mitral Valve: Mitral valve thickening. Moderate to severe mitral valve   regurgitation. · Tricuspid Valve: Mild tricuspid valve regurgitation is present. Pulmonary arterial systolic pressure is 49.1 mmHg. Moderate pulmonary   hypertension. · Pulmonic Valve: Mild pulmonic valve regurgitation is present. · IVC/Hepatic Veins: Severely elevated central venous pressure (15+ mmHg);   IVC diameter is larger than 21 mm and collapses less than 50% with   respiration. Signed by: Jessika Li MD       08/25/19   NUCLEAR CARDIAC STRESS TEST 08/28/2019 8/28/2019    Narrative · Baseline ECG: Normal sinus rhythm, occasional PACs. · Gated SPECT: Left ventricular function post-stress was abnormal.   Calculated ejection fraction is 44%. The TID ratio is 0.94.   · Negative stress test.  · Left ventricular perfusion is abnormal.  · Myocardial perfusion imaging defect 1: There is a defect that is   moderate in size with a severe reduction in uptake present in the   mid-apical anterior and apex location(s) that is non-reversible. There is   abnormal wall motion in the defect area. Viability in the area is poor. The defect appears to be infarction. Perfusion defect was visually present   without quantitative evidence. · Positive myocardial perfusion imaging. Myocardial perfusion imaging   supports an intermediate risk stress test.        Signed by: Xanedr Corrales MD        10/18/19   ECHO ADULT FOLLOW-UP OR LIMITED 10/21/2019 10/21/2019    Narrative · Left Ventricle: Mildly dilated left ventricle. Mild concentric   hypertrophy. Mild-to-moderate systolic dysfunction. Calculated left   ventricular ejection fraction is 46%. Biplane method used to measure   ejection fraction. · Mitral Valve: Mitral valve thickening. Mitral annular calcification. Moderate mitral valve regurgitation is present. The mitral regurgitant jet   is eccentric. · Interatrial Septum: Agitated saline contrast study was performed. No   obvious septal defect. Signed by: Leana Ware MD     10/18/19   CARDIAC PROCEDURE 10/25/2019 10/25/2019    Narrative Patent stent in proximal LAD. Mildly reduced LV function. Continue intense risk factor modification. Signed by: Leana Ware MD           No flowsheet data found. Ms. Maria Del Rosario Don has a reminder for a \"due or due soon\" health maintenance. I have asked that she contact her primary care provider for follow-up on this health maintenance. Physical Exam   Constitutional: She is oriented to person, place, and time. She appears well-developed and well-nourished. Neck: No JVD present. Cardiovascular: Normal rate, regular rhythm, normal heart sounds and intact distal pulses. Exam reveals no gallop and no friction rub. No murmur heard.   Pulmonary/Chest: Effort normal and breath sounds normal. No respiratory distress. She has no wheezes. She has no rales. She exhibits no tenderness. Abdominal: Soft. She exhibits no distension and no mass. There is no tenderness. Musculoskeletal: Normal range of motion. She exhibits no edema. Neurological: She is alert and oriented to person, place, and time. Skin: Skin is warm and dry. Psychiatric: She has a normal mood and affect. ASSESSMENT and PLAN      HLD : Results for Candida Duong (MRN [id number]) as of 10/11/2019 12:54   Ref. Range 9/10/2019 00:00 9/30/2019 17:00 10/10/2019 00:00   Triglyceride Latest Ref Range: 0 - 149 mg/dL 67  101   Cholesterol, total Latest Ref Range: 100 - 199 mg/dL 168  152   HDL Cholesterol Latest Ref Range: >39 mg/dL 55  46   LDL, calculated Latest Ref Range: 0 - 99 mg/dL 100 (H)  86   VLDL, calculated Latest Ref Range: 5 - 40 mg/dL 13  20     Mitral regurgitation: 10/19 moderate; May take extra Lasix if she gets short of breath or notices weight going up more than 220 pounds at home. Follow-up electrolytes periodically. Check home BP chart and if it stays elevated may need to increase medications. She will try to lose weight. Diet weight and exercise discussed and she is trying to follow. Diagnoses and all orders for this visit:    1. Chronic systolic congestive heart failure (HCC)  -     METABOLIC PANEL, BASIC; Future    2. Coronary artery disease involving native coronary artery of native heart without angina pectoris    3. Ischemic cardiomyopathy    4. Essential hypertension    5. Severe obesity (BMI 35.0-39. 9) with comorbidity ([address]    6. Dyslipidemia, goal LDL below 70        Pertinent laboratory and test data reviewed and discussed with patient. See patient instructions also for other medical advice given    There are no discontinued medications.     Follow-up and Dispositions    · Return in about 3 months (around 2/7/2020), or if symptoms worsen or fail to improve, for post test.

## 2019-11-18 RX ORDER — ATORVASTATIN CALCIUM 40 MG/1
TABLET, FILM COATED ORAL DAILY
COMMUNITY
End: 2019-11-18 | Stop reason: SDUPTHER

## 2019-11-18 RX ORDER — ATORVASTATIN CALCIUM 40 MG/1
40 TABLET, FILM COATED ORAL DAILY
Qty: 90 TAB | Refills: 1 | Status: SHIPPED | OUTPATIENT
Start: 2019-11-18 | End: 2020-02-13 | Stop reason: SDUPTHER

## 2019-12-05 ENCOUNTER — HOSPITAL ENCOUNTER (OUTPATIENT)
Dept: LAB | Age: 69
Discharge: HOME | End: 2019-12-05
Payer: MEDICARE

## 2019-12-05 DIAGNOSIS — I50.22 CHRONIC SYSTOLIC CONGESTIVE HEART FAILURE (HCC): ICD-10-CM

## 2019-12-05 LAB
ANION GAP SERPL CALC-SCNC: 8 MMOL/L (ref 3–18)
BUN SERPL-MCNC: 29 MG/DL (ref 7–18)
BUN/CREAT SERPL: 16 (ref 12–20)
CALCIUM SERPL-MCNC: 8.3 MG/DL (ref 8.5–10.1)
CHLORIDE SERPL-SCNC: 111 MMOL/L (ref 100–111)
CO2 SERPL-SCNC: 24 MMOL/L (ref 21–32)
CREAT SERPL-MCNC: 1.8 MG/DL (ref 0.6–1.3)
GLUCOSE SERPL-MCNC: 178 MG/DL (ref 74–99)
POTASSIUM SERPL-SCNC: 4.2 MMOL/L (ref 3.5–5.5)
SODIUM SERPL-SCNC: 143 MMOL/L (ref 136–145)

## 2019-12-05 PROCEDURE — 80048 BASIC METABOLIC PNL TOTAL CA: CPT

## 2019-12-05 PROCEDURE — 36415 COLL VENOUS BLD VENIPUNCTURE: CPT

## 2019-12-08 ENCOUNTER — HOSPITAL ENCOUNTER (EMERGENCY)
Age: 69
Discharge: HOME | End: 2019-12-08
Attending: EMERGENCY MEDICINE
Payer: MEDICARE

## 2019-12-08 VITALS
DIASTOLIC BLOOD PRESSURE: 69 MMHG | HEIGHT: 64 IN | TEMPERATURE: 98.3 F | WEIGHT: 220 LBS | BODY MASS INDEX: 37.56 KG/M2 | OXYGEN SATURATION: 98 % | RESPIRATION RATE: 20 BRPM | HEART RATE: 89 BPM | SYSTOLIC BLOOD PRESSURE: 145 MMHG

## 2019-12-08 DIAGNOSIS — G56.02 CARPAL TUNNEL SYNDROME OF LEFT WRIST: Primary | ICD-10-CM

## 2019-12-08 PROCEDURE — 99283 EMERGENCY DEPT VISIT LOW MDM: CPT

## 2019-12-08 RX ORDER — TRAMADOL HYDROCHLORIDE 50 MG/1
50 TABLET ORAL
Qty: 8 TAB | Refills: 0 | Status: SHIPPED | OUTPATIENT
Start: 2019-12-08 | End: 2019-12-11

## 2019-12-08 RX ORDER — PREDNISONE 10 MG/1
TABLET ORAL
Qty: 21 TAB | Refills: 0 | Status: SHIPPED | OUTPATIENT
Start: 2019-12-08 | End: 2020-02-13

## 2019-12-08 NOTE — ED NOTES
Davin English is a 81 y.o. female that was discharged in stable condition. The patients diagnosis, condition and treatment were explained to  patient and aftercare instructions were given. The patient verbalized understanding. Patient armband removed and shredded.

## 2019-12-08 NOTE — DISCHARGE INSTRUCTIONS
Patient Education        Carpal Tunnel Syndrome: Care Instructions  Your Care Instructions    Carpal tunnel syndrome is a nerve problem. It can cause tingling, numbness, weakness, or pain in the fingers, thumb, and hand. The median nerve and several tough tissues called tendons run through a space in the wrist called the carpal tunnel. The repeated hand motions used in work and some hobbies and sports can put pressure on the nerve. Pregnancy and several conditions, including diabetes, arthritis, and an underactive thyroid, also can cause carpal tunnel syndrome. You may be able to limit an activity or do it differently to reduce your symptoms. You also can take other steps to feel better. If your symptoms are mild, 1 to 2 weeks of home treatment are likely to ease your pain. Surgery is needed only if other treatments do not work. Follow-up care is a key part of your treatment and safety. Be sure to make and go to all appointments, and call your doctor if you are having problems. It's also a good idea to know your test results and keep a list of the medicines you take. How can you care for yourself at home? · If possible, stop or reduce the activity that causes your symptoms. If you cannot stop the activity, take frequent breaks to rest and stretch or change hand positions to do a task. Try switching hands, such as when using a computer mouse. · Try to avoid bending or twisting your wrists. · Ask your doctor if you can take an over-the-counter pain medicine, such as acetaminophen (Tylenol), ibuprofen (Advil, Motrin), or naproxen (Aleve). Be safe with medicines. Read and follow all instructions on the label. · If your doctor prescribes corticosteroid medicine to help reduce pain and swelling, take it exactly as prescribed. Call your doctor if you think you are having a problem with your medicine. · Put ice or a cold pack on your wrist for 10 to 20 minutes at a time to ease pain.  Put a thin cloth between the ice and your skin. · If your doctor or your physical or occupational therapist tells you to wear a wrist splint, wear it as directed to keep your wrist in a neutral position. This also eases pressure on your median nerve. · Ask your doctor whether you should have physical or occupational therapy to learn how to do tasks differently. · Try a yoga class to stretch your muscles and build strength in your hands and wrists. Yoga has been shown to ease carpal tunnel symptoms. To prevent carpal tunnel  · When working at a computer, keep your hands and wrists in line with your forearms. Hold your elbows close to your sides. Take a break every 10 to 15 minutes. · Try these exercises:  ¨ Warm up: Rotate your wrist up, down, and from side to side. Repeat this 4 times. Stretch your fingers far apart, relax them, then stretch them again. Repeat 4 times. Stretch your thumb by pulling it back gently, holding it, and then releasing it. Repeat 4 times. ¨ Prayer stretch: Start with your palms together in front of your chest just below your chin. Slowly lower your hands toward your waistline while keeping your hands close to your stomach and your palms together until you feel a mild to moderate stretch under your forearms. Hold for 10 to 20 seconds. Repeat 4 times. ¨ Wrist flexor stretch: Hold your arm in front of you with your palm up. Bend your wrist, pointing your hand toward the floor. With your other hand, gently bend your wrist further until you feel a mild to moderate stretch in your forearm. Hold for 10 to 20 seconds. Repeat 4 times. ¨ Wrist extensor stretch: Repeat the steps for the wrist flexor stretch, but begin with your extended hand palm down. · Squeeze a rubber exercise ball several times a day to keep your hands and fingers strong. · Avoid holding objects (such as a book) in one position for a long time. When possible, use your whole hand to grasp an object.  Using just the thumb and index finger can put stress on the wrist.  · Do not smoke. It can make this condition worse by reducing blood flow to the median nerve. If you need help quitting, talk to your doctor about stop-smoking programs and medicines. These can increase your chances of quitting for good. When should you call for help? Watch closely for changes in your health, and be sure to contact your doctor if:  ? · Your pain or other problems do not get better with home care. ? · You want more information about physical or occupational therapy. ? · You have side effects of your corticosteroid medicine, such as:  ¨ Weight gain. ¨ Mood changes. ¨ Trouble sleeping. ¨ Bruising easily. ? · You have any other problems with your medicine. Where can you learn more? Go to http://jason-charity.info/. Enter R432 in the search box to learn more about \"Carpal Tunnel Syndrome: Care Instructions. \"  Current as of: March 21, 2017  Content Version: 11.5  © 4964-6324 [employer]. Care instructions adapted under license by [employer] (which disclaims liability or warranty for this information). If you have questions about a medical condition or this instruction, always ask your healthcare professional. [place] [address] any warranty or liability for your use of this information. [employer] Activation    Thank you for requesting access to [employer]. Please follow the instructions below to securely access and download your online medical record. [employer] allows you to send messages to your doctor, view your test results, renew your prescriptions, schedule appointments, and more. How Do I Sign Up? 1. In your internet browser, go to [email or web address]  2. Click on the First Time User? Click Here link in the Sign In box. You will be redirect to the New Member Sign Up page. 3. Enter your [employer] Access Code exactly as it appears below.  You will not need to use this code after youve completed the sign-up process. If you do not sign up before the expiration date, you must request a new code. [employer] Access Code: [email or web address]  Expires: 1/10/2020 12:55 PM (This is the date your [employer] access code will )    4. Enter the last four digits of your Social Security Number (xxxx) and Date of Birth (mm/dd/yyyy) as indicated and click Submit. You will be taken to the next sign-up page. 5. Create a [employer] ID. This will be your [employer] login ID and cannot be changed, so think of one that is secure and easy to remember. 6. Create a [employer] password. You can change your password at any time. 7. Enter your Password Reset Question and Answer. This can be used at a later time if you forget your password. 8. Enter your e-mail address. You will receive e-mail notification when new information is available in [address] 9. Click Sign Up. You can now view and download portions of your medical record. 10. Click the Download Summary menu link to download a portable copy of your medical information. Additional Information    If you have questions, please visit the Frequently Asked Questions section of the [employer] website at [email or web address] Remember, [employer] is NOT to be used for urgent needs. For medical emergencies, dial 911. Complete all medications as prescribed. Follow-up with ortho in 1 week. Return to the ED immediately for any new or worsening symptoms.

## 2019-12-08 NOTE — ED TRIAGE NOTES
Pt c/o left 4th and 5th fingers, palm of hand and wrist feeling numb since Nov. 4th. Saw her PCP 11/18 and was told it was a pinched nerve. Still having the problem. Was given a gel to apply that isn't helping.  Gets sharp shooting pains off and on

## 2019-12-08 NOTE — ED PROVIDER NOTES
EMERGENCY DEPARTMENT HISTORY AND PHYSICAL EXAM    Date: 12/8/2019  Patient Name: Greg Kovacs    History of Presenting Illness     Chief Complaint   Patient presents with    Finger Pain    Wrist Pain         History Provided By: patient     Chief Complaint: finger and hand pain  Duration: since November 4th   Timing: chronic  Location: L 4th and 5th fingers  Quality: sharp pain, tingling  Severity:modderate  Modifying Factors: voltaren gel has not helped  Associated Symptoms: none       Additional History (Context): Greg Kovacs is a 81 y.o. female with PMH COPD, DM, htn, hypercholesterolemia, dilated cardiomyopathy, and MI who presents with c/o pain and tingling down the L forearm into the L 4th and 5th fingers that began around Nov 4. Pt states she was seen shortly after the onset of sx and diagnosed with a pinched nerve. She reports using voltaren gel with no relief in sx. No other complaints reported at this time. PCP: Abrahan Rojas MD    Current Outpatient Medications   Medication Sig Dispense Refill    predniSONE (STERAPRED DS) 10 mg dose pack Use as directed, closely monitor your blood glucose while taking this medication as it can cause elevated glucose levels 21 Tab 0    traMADol (ULTRAM) 50 mg tablet Take 1 Tab by mouth every six (6) hours as needed for Pain for up to 3 days. Max Daily Amount: 200 mg. 8 Tab 0    atorvastatin (LIPITOR) 40 mg tablet Take 1 Tab by mouth daily. 90 Tab 1    pantoprazole (PROTONIX) 40 mg tablet Take 1 Tab by mouth daily. 30 Tab 0    tizanidine HCl (TIZANIDINE PO) Take  by mouth.  furosemide (LASIX) 40 mg tablet Take 1 Tab by mouth two (2) times a day. 30 Tab 0    carvedilol (COREG) 6.25 mg tablet Take 1 Tab by mouth two (2) times daily (with meals). 60 Tab 2    sacubitril-valsartan (ENTRESTO) 97 mg/103 mg tablet Take 1 Tab by mouth two (2) times a day. 60 Tab 6    aspirin 81 mg chewable tablet Take 2 Tabs by mouth daily.  60 Tab 0    insulin detemir (LEVEMIR) 100 unit/mL injection 15 units daily for diabetes 2 Vial 0    desloratadine (CLARINEX) 5 mg tablet Take 1 Tab by mouth daily as needed for Allergies. 30 Tab 0    mometasone (NASONEX) 50 mcg/actuation nasal spray 2 Sprays by Both Nostrils route daily as needed. For allergies 3 Container 3    umeclidinium-vilanterol (ANORO ELLIPTA) 62.5-25 mcg/actuation inhaler Take 1 Puff by inhalation daily. For COPD 3 Inhaler 3    ferrous sulfate 325 mg (65 mg iron) tablet Take 1 Tab by mouth two (2) times a day. Indications: IRON DEFICIENCY ANEMIA 60 Tab 11    albuterol (PROVENTIL HFA, VENTOLIN HFA, PROAIR HFA) 90 mcg/actuation inhaler Take 2 Puffs by inhalation every four (4) hours as needed for Wheezing. 1 Inhaler 0       Past History     Past Medical History:  Past Medical History:   Diagnosis Date    Abnormal WBC count 5/17/2016    Bilateral shoulder pain 9/27/2016    Chondromalacia of both patellae     COPD (chronic obstructive pulmonary disease) ([facility] 9/2015    mild-mod dz; Dr Taryn Ballesteros    Diabetes [facility]) 2013    Diabetic eye exam ([facility] 2016    Dilated cardiomyopathy ([facility]     Dyslipidemia     Gout     Heart attack ([facility] [address]     Heart attack ([facility]     History of echocardiogram 11/14/2014    Mild LVE. EF 40%. Mild, diffuse hypk. Mild LAE.   Mild MR.      Hypercholesteremia 1/08/14    Hypertension 2000    Noncompliance with medications 2/16/2016    Obesity     Orthostatic hypotension 5/17/2016    Osteoarthritis of both knees     Pain management 04/01/2016    Dr. Marcos Burkitt Popliteal cyst, bilateral      Vitamin D deficiency 10/16/14       Past Surgical History:  Past Surgical History:   Procedure Laterality Date    HX HEART CATHETERIZATION      HX TUBAL LIGATION         Family History:  Family History   Problem Relation Age of Onset    Diabetes Mother     Hypertension Mother     Hypertension Father     Kidney Disease Maternal Aunt 48        Dialysis       Social History:  Social History     Tobacco Use    Smoking status: Former Smoker     Packs/day: 0.25     Years: 48.00     Pack years: 12.00     Types: Cigarettes     Last attempt to quit: 2018     Years since quittin.5    Smokeless tobacco: Never Used   Substance Use Topics    Alcohol use: No     Alcohol/week: 0.0 standard drinks    Drug use: No       Allergies:  No Known Allergies      Review of Systems   Review of Systems   Constitutional: Negative. Negative for chills and fever. HENT: Negative. Negative for congestion, ear pain and rhinorrhea. Eyes: Negative. Negative for pain and redness. Respiratory: Negative. Negative for cough, shortness of breath, wheezing and stridor. Cardiovascular: Negative. Negative for chest pain and leg swelling. Gastrointestinal: Negative. Negative for abdominal pain, constipation, diarrhea, nausea and vomiting. Genitourinary: Negative. Negative for dysuria and frequency. Musculoskeletal: Positive for arthralgias. Negative for back pain and neck pain. Skin: Negative. Negative for rash and wound. Neurological: Negative for dizziness, seizures, syncope and headaches. Tingling/numbness into the L 4th and 5th fingeres   All other systems reviewed and are negative. All Other Systems Negative  Physical Exam     Vitals:    19 1516   BP: 145/69   Pulse: 89   Resp: 20   Temp: 98.3 °F (36.8 °C)   SpO2: 98%   Weight: 99.8 kg (220 lb)   Height: 5' 4\" (1.626 m)     Physical Exam  Vitals signs and nursing note reviewed. Constitutional:       General: She is not in acute distress. Appearance: She is well-developed. She is not diaphoretic. HENT:      Head: Normocephalic and atraumatic. Eyes:      General: No scleral icterus. Right eye: No discharge. Left eye: No discharge. Conjunctiva/sclera: Conjunctivae normal.   Neck:      Musculoskeletal: Normal range of motion and neck supple.    Cardiovascular:      Rate and Rhythm: Normal rate and regular rhythm. Heart sounds: Normal heart sounds. No murmur. No friction rub. No gallop. Pulmonary:      Effort: Pulmonary effort is normal. No respiratory distress. Breath sounds: Normal breath sounds. No stridor. No wheezing or rales. Musculoskeletal: Normal range of motion. Comments: LUE: intact pulses, no edema or deformity noted. No point bony TTP noted. ROM of all joints intact. Positive tinel's. Skin:     General: Skin is warm and dry. Findings: No erythema or rash. Neurological:      General: No focal deficit present. Mental Status: She is alert and oriented to person, place, and time. Mental status is at baseline. Cranial Nerves: No cranial nerve deficit. Sensory: No sensory deficit. Motor: No weakness. Coordination: Coordination normal.      Comments: Gait is steady and patient exhibits no evidence of ataxia. Patient is able to ambulate without difficulty. No focal neurological deficit noted. No facial droop, slurred speech, or evidence of altered mentation noted on exam.     Psychiatric:         Behavior: Behavior normal.         Thought Content: Thought content normal.              Diagnostic Study Results     Labs -   No results found for this or any previous visit (from the past 12 hour(s)). Radiologic Studies -   No orders to display     CT Results  (Last 48 hours)    None        CXR Results  (Last 48 hours)    None            Medical Decision Making   I am the first provider for this patient. I reviewed the vital signs, available nursing notes, past medical history, past surgical history, family history and social history. Vital Signs-Reviewed the patient's vital signs. Records Reviewed: Mayela Restrepo PA-C     Procedures:  Procedures    Provider Notes (Medical Decision Making): Impression:  Carpal tunnel    Clinical presentation suggestive of carpal tunnel.  Will plan to start on prednisone/ultram and recommend ortho follow-up. Wrist immobilizer placed as well. Pt agrees. Mayela Restrepo PA-C     MED RECONCILIATION:  No current facility-administered medications for this encounter. Current Outpatient Medications   Medication Sig    predniSONE (STERAPRED DS) 10 mg dose pack Use as directed, closely monitor your blood glucose while taking this medication as it can cause elevated glucose levels    traMADol (ULTRAM) 50 mg tablet Take 1 Tab by mouth every six (6) hours as needed for Pain for up to 3 days. Max Daily Amount: 200 mg.    atorvastatin (LIPITOR) 40 mg tablet Take 1 Tab by mouth daily.  pantoprazole (PROTONIX) 40 mg tablet Take 1 Tab by mouth daily.  tizanidine HCl (TIZANIDINE PO) Take  by mouth.  furosemide (LASIX) 40 mg tablet Take 1 Tab by mouth two (2) times a day.  carvedilol (COREG) 6.25 mg tablet Take 1 Tab by mouth two (2) times daily (with meals).  sacubitril-valsartan (ENTRESTO) 97 mg/103 mg tablet Take 1 Tab by mouth two (2) times a day.  aspirin 81 mg chewable tablet Take 2 Tabs by mouth daily.  insulin detemir (LEVEMIR) 100 unit/mL injection 15 units daily for diabetes    desloratadine (CLARINEX) 5 mg tablet Take 1 Tab by mouth daily as needed for Allergies.  mometasone (NASONEX) 50 mcg/actuation nasal spray 2 Sprays by Both Nostrils route daily as needed. For allergies    umeclidinium-vilanterol (ANORO ELLIPTA) 62.5-25 mcg/actuation inhaler Take 1 Puff by inhalation daily. For COPD    ferrous sulfate 325 mg (65 mg iron) tablet Take 1 Tab by mouth two (2) times a day. Indications: IRON DEFICIENCY ANEMIA    albuterol (PROVENTIL HFA, VENTOLIN HFA, PROAIR HFA) 90 mcg/actuation inhaler Take 2 Puffs by inhalation every four (4) hours as needed for Wheezing. Disposition:  D.c    DISCHARGE NOTE:   ,Patient is stable for discharge at this time. I have discussed all the findings from today's work up with the patient, including lab results and imaging.  I have answered all questions. Rx for prednisone and ultram given. Rest and close follow-up with the orthopedist recommended this week. Return to the ED immediately for any new or worsening symptoms. Mayela Restrepo PA-C     Follow-up Information     Follow up With Specialties Details Why [address]  Schedule an appointment as soon as possible for a visit in 1 week  [address] [place] [address] EMERGENCY DEPT Emergency Medicine  As needed, If symptoms worsen [address] [place] [address]  [phone]          Current Discharge Medication List      START taking these medications    Details   predniSONE (STERAPRED DS) 10 mg dose pack Use as directed, closely monitor your blood glucose while taking this medication as it can cause elevated glucose levels  Qty: 21 Tab, Refills: 0      traMADol (ULTRAM) 50 mg tablet Take 1 Tab by mouth every six (6) hours as needed for Pain for up to 3 days. Max Daily Amount: 200 mg. Qty: 8 Tab, Refills: 0    Associated Diagnoses: Carpal tunnel syndrome of left wrist                   Diagnosis     Clinical Impression:   1.  Carpal tunnel syndrome of left wrist

## 2020-01-16 ENCOUNTER — OFFICE VISIT (OUTPATIENT)
Dept: ORTHOPEDIC SURGERY | Facility: CLINIC | Age: 70
End: 2020-01-16

## 2020-01-16 VITALS
WEIGHT: 229 LBS | RESPIRATION RATE: 18 BRPM | BODY MASS INDEX: 39.09 KG/M2 | HEART RATE: 111 BPM | OXYGEN SATURATION: 97 % | TEMPERATURE: 96.9 F | HEIGHT: 64 IN | DIASTOLIC BLOOD PRESSURE: 78 MMHG | SYSTOLIC BLOOD PRESSURE: 158 MMHG

## 2020-01-16 DIAGNOSIS — G89.29 CHRONIC PAIN OF RIGHT KNEE: ICD-10-CM

## 2020-01-16 DIAGNOSIS — M17.11 LOCALIZED OSTEOARTHRITIS OF RIGHT KNEE: ICD-10-CM

## 2020-01-16 DIAGNOSIS — G89.29 CHRONIC PAIN OF LEFT KNEE: ICD-10-CM

## 2020-01-16 DIAGNOSIS — M25.562 CHRONIC PAIN OF LEFT KNEE: ICD-10-CM

## 2020-01-16 DIAGNOSIS — M25.561 CHRONIC PAIN OF RIGHT KNEE: ICD-10-CM

## 2020-01-16 DIAGNOSIS — M17.12 LOCALIZED OSTEOARTHRITIS OF LEFT KNEE: Primary | ICD-10-CM

## 2020-01-16 RX ORDER — BETAMETHASONE SODIUM PHOSPHATE AND BETAMETHASONE ACETATE 3; 3 MG/ML; MG/ML
6 INJECTION, SUSPENSION INTRA-ARTICULAR; INTRALESIONAL; INTRAMUSCULAR; SOFT TISSUE ONCE
Qty: 0.5 ML | Refills: 0
Start: 2020-01-16 | End: 2020-01-16

## 2020-01-16 NOTE — PROGRESS NOTES
Patient: Liana Call                MRN: [id number]       SSN: [id number]  YOB: 1950        AGE: 81 y.o. SEX: female      PCP: Alex Ricks MD  20    Chief Complaint   Patient presents with    Knee Pain     Wily knee pain     HISTORY:  Liana Call is a 81 y.o. female who is seen for increased bilateral knee pain. She has been experiencing knee pain for the past several years. She does not recall any injury. She notes pain with standing, walking and stair climbing. She experiences startup pain after sitting. Pain Assessment  2020   Location of Pain Knee   Location Modifiers Left;Right   Severity of Pain 10   Quality of Pain Sharp   Quality of Pain Comment -   Duration of Pain Persistent   Frequency of Pain Constant   Aggravating Factors Squatting;Standing;Walking;Stairs   Aggravating Factors Comment Pain with any movement even sitting down   Limiting Behavior -   Relieving Factors Rest;Ice;Heat;Elevation   Result of Injury No   Work-Related Injury -   Type of Injury -   Type of Injury Comment -     Occupation, etc:Ms. Linda Hensley is appealing her denial for Social Security Disability Benefits. She moved from [place] to [facility] with her  when her mother  several years ago. She moved from [facility] to [place] 10 years ago. She and her  live in [place]. She has 3 sons--1 in [place] and 2 in [facility] and 13 grandchildren. She has 2 Chihuahua-Poodle mixes--Frisky and Boots. She gained weight recently. She is 229 pounds and 5'4\" tall. Last 3 Recorded Weights in this Encounter    20 1109   Weight: 229 lb (103.9 kg)     Body mass index is 39.31 kg/m². REVIEW OF SYSTEMS: All Below are Negative except: See HPI     Constitutional: negative for fever, chills, and weight loss.    Cardiovascular: negative for chest pain, claudication, leg swelling, SOB, PIERSON   Gastrointestinal: Negative for pain, N/V/C/D, Blood in stool or urine, dysuria,  hematuria, incontinence, pelvic pain. Musculoskeletal: See HPI   Neurological: Negative for dizziness and weakness. Negative for headaches, Visual changes, confusion, seizures   Phychiatric/Behavioral: Negative for depression, memory loss, substance  abuse. Extremities: Negative for hair changes, rash, or skin lesion changes. Hematologic: Negative for bleeding problems, bruising, pallor or swollen lymph  nodes   Peripheral Vascular: No calf pain, no circulation deficits.     Social History     Socioeconomic History    Marital status: LEGALLY      Spouse name: Not on file    Number of children: 3    Years of education: Not on file    Highest education level: Not on file   Occupational History    Occupation: retired   Social Needs    Financial resource strain: Not on file    Food insecurity:     Worry: Not on file     Inability: Not on file   Drawn to Scale needs:     Medical: Not on file     Non-medical: Not on file   Tobacco Use    Smoking status: Former Smoker     Packs/day: 0.25     Years: 48.00     Pack years: 12.00     Types: Cigarettes     Last attempt to quit: 2018     Years since quittin.6    Smokeless tobacco: Never Used   Substance and Sexual Activity    Alcohol use: No     Alcohol/week: 0.0 standard drinks    Drug use: No    Sexual activity: Yes     Partners: Male   Lifestyle    Physical activity:     Days per week: Not on file     Minutes per session: Not on file    Stress: Not on file   Relationships    Social connections:     Talks on phone: Not on file     Gets together: Not on file     Attends Denominational service: Not on file     Active member of club or organization: Not on file     Attends meetings of clubs or organizations: Not on file     Relationship status: Not on file    Intimate partner violence:     Fear of current or ex partner: Not on file     Emotionally abused: Not on file     Physically abused: Not on file     Forced sexual activity: Not on file   Other Topics Concern     Service No    Blood Transfusions No    Caffeine Concern No    Occupational Exposure No    Hobby Hazards No    Sleep Concern No    Stress Concern No    Weight Concern No    Special Diet No    Back Care No    Exercise No    Bike Helmet No    Seat Belt Yes    Self-Exams Yes   Social History Narrative    Not on file        No Known Allergies     Current Outpatient Medications   Medication Sig    predniSONE (STERAPRED DS) 10 mg dose pack Use as directed, closely monitor your blood glucose while taking this medication as it can cause elevated glucose levels    atorvastatin (LIPITOR) 40 mg tablet Take 1 Tab by mouth daily.  pantoprazole (PROTONIX) 40 mg tablet Take 1 Tab by mouth daily.  tizanidine HCl (TIZANIDINE PO) Take  by mouth.  furosemide (LASIX) 40 mg tablet Take 1 Tab by mouth two (2) times a day.  carvedilol (COREG) 6.25 mg tablet Take 1 Tab by mouth two (2) times daily (with meals).  albuterol (PROVENTIL HFA, VENTOLIN HFA, PROAIR HFA) 90 mcg/actuation inhaler Take 2 Puffs by inhalation every four (4) hours as needed for Wheezing.  sacubitril-valsartan (ENTRESTO) 97 mg/103 mg tablet Take 1 Tab by mouth two (2) times a day.  aspirin 81 mg chewable tablet Take 2 Tabs by mouth daily.  insulin detemir (LEVEMIR) 100 unit/mL injection 15 units daily for diabetes    desloratadine (CLARINEX) 5 mg tablet Take 1 Tab by mouth daily as needed for Allergies.  mometasone (NASONEX) 50 mcg/actuation nasal spray 2 Sprays by Both Nostrils route daily as needed. For allergies    umeclidinium-vilanterol (ANORO ELLIPTA) 62.5-25 mcg/actuation inhaler Take 1 Puff by inhalation daily. For COPD    ferrous sulfate 325 mg (65 mg iron) tablet Take 1 Tab by mouth two (2) times a day. Indications: IRON DEFICIENCY ANEMIA     No current facility-administered medications for this visit.          PHYSICAL EXAMINATION:  Visit Vitals  /78   Pulse (!) 111   Temp 96.9 °F (36.1 °C) (Oral)   Resp 18   Ht 5' 4\" (1.626 m)   Wt 229 lb (103.9 kg)   SpO2 97%   BMI 39.31 kg/m²        ORTHO EXAMINATION:  Examination Left knee Right knee   Skin Intact Intact   Range of motion 110-0 110-0   Effusion - -   Medial joint line tenderness + +   Lateral joint line tenderness - -   Popliteal tenderness - -   Osteophytes palpable + +   Mayurs - -   Patella crepitus + +   Anterior drawer - -   Lateral laxity - -   Medial laxity - -   Varus deformity - -   Valgus deformity - -   Pretibial edema - -   Calf tenderness - -       Chart reviewed for the following:   I, Rochelle Leger MD, have reviewed the History, Physical and updated the Allergic reactions for [address] performed immediately prior to start of procedure:  Blake Larson MD, have performed the following reviews on [employer] prior to the start of the procedure:            * Patient was identified by name and date of birth   * Agreement on procedure being performed was verified  * Risks and Benefits explained to the patient  * Procedure site verified and marked as necessary  * Patient was positioned for comfort  * Consent was obtained     Time: 11:38 AM    Date of procedure: 1/16/2020    Procedure performed by:  Rochelle Leger MD    Ms. Yin tolerated the procedure well with no complications. RADIOGRAPHS:  XR BILAT KNEE 1/16/20 ZAK  IMPRESSION:  Three views - No fractures, no effusion, moderate medial joint space narrowing, + osteophytes present. Kellgren Dariel grade 2     IMPRESSION:      ICD-10-CM ICD-9-CM    1. Localized osteoarthritis of left knee M17.12 715.36 betamethasone (CELESTONE SOLUSPAN) 6 mg/mL injection      BETAMETHASONE ACETATE & SODIUM PHOSPHATE INJECTION 3 MG EA.      DRAIN/INJECT LARGE JOINT/BURSA      REFERRAL TO PHYSICAL THERAPY      PROCEDURE AUTHORIZATION TO    2.  Chronic pain of right knee M25.561 719.46 AMB POC X-RAY KNEE 3 VIEW    G89.29 338.29 betamethasone (CELESTONE SOLUSPAN) 6 mg/mL injection      BETAMETHASONE ACETATE & SODIUM PHOSPHATE INJECTION 3 MG EA.      DRAIN/INJECT LARGE JOINT/BURSA      REFERRAL TO PHYSICAL THERAPY      PROCEDURE AUTHORIZATION TO    3. Chronic pain of left knee M25.562 719.46 AMB POC X-RAY KNEE 3 VIEW    G89.29 338.29 betamethasone (CELESTONE SOLUSPAN) 6 mg/mL injection      BETAMETHASONE ACETATE & SODIUM PHOSPHATE INJECTION 3 MG EA.      DRAIN/INJECT LARGE JOINT/BURSA      REFERRAL TO PHYSICAL THERAPY      PROCEDURE AUTHORIZATION TO    4. Localized osteoarthritis of right knee M17.11 715.36 betamethasone (CELESTONE SOLUSPAN) 6 mg/mL injection      BETAMETHASONE ACETATE & SODIUM PHOSPHATE INJECTION 3 MG EA.      DRAIN/INJECT LARGE JOINT/BURSA      REFERRAL TO PHYSICAL THERAPY      PROCEDURE AUTHORIZATION TO        PLAN: After discussing treatment options, patient's knees were injected with 4 cc Marcaine and 1/2 cc Celestone. Consider visco supplementation if pain continues. She will start a brief course of outpatient aquatic physical therapy. There is no need for surgery at this time. She will follow up as needed.      Scribed by Braydon Luna  ([address]) as dictated by Tanisha Obrien MD

## 2020-01-16 NOTE — PROGRESS NOTES
1. Have you been to the ER, urgent care clinic since your last visit? Hospitalized since your last visit? No    2. Have you seen or consulted any other health care providers outside of the [address] [place] [address] since your last visit? Include any pap smears or colon screening.  No

## 2020-01-16 NOTE — PROGRESS NOTES
Verbal order given by Dr. Dallas Burks to draw up 4 mL 0.25% Sensorcaine and 0.5 mL 30 mg/5mL Betamethasone.  X 2.

## 2020-02-13 ENCOUNTER — OFFICE VISIT (OUTPATIENT)
Dept: CARDIOLOGY CLINIC | Age: 70
End: 2020-02-13

## 2020-02-13 VITALS
HEIGHT: 64 IN | OXYGEN SATURATION: 98 % | DIASTOLIC BLOOD PRESSURE: 58 MMHG | HEART RATE: 85 BPM | SYSTOLIC BLOOD PRESSURE: 143 MMHG | BODY MASS INDEX: 39.5 KG/M2 | WEIGHT: 231.4 LBS | TEMPERATURE: 98.5 F

## 2020-02-13 DIAGNOSIS — I25.5 ISCHEMIC CARDIOMYOPATHY: ICD-10-CM

## 2020-02-13 DIAGNOSIS — I10 ESSENTIAL HYPERTENSION: ICD-10-CM

## 2020-02-13 DIAGNOSIS — E66.01 SEVERE OBESITY (BMI 35.0-39.9) WITH COMORBIDITY (HCC): ICD-10-CM

## 2020-02-13 DIAGNOSIS — E78.5 DYSLIPIDEMIA, GOAL LDL BELOW 70: ICD-10-CM

## 2020-02-13 DIAGNOSIS — I50.22 CHRONIC SYSTOLIC CONGESTIVE HEART FAILURE (HCC): Primary | ICD-10-CM

## 2020-02-13 DIAGNOSIS — I25.10 CORONARY ARTERY DISEASE INVOLVING NATIVE CORONARY ARTERY OF NATIVE HEART WITHOUT ANGINA PECTORIS: ICD-10-CM

## 2020-02-13 RX ORDER — FUROSEMIDE 40 MG/1
40 TABLET ORAL
Qty: 180 TAB | Refills: 1 | Status: SHIPPED | OUTPATIENT
Start: 2020-02-13 | End: 2020-08-13 | Stop reason: SDUPTHER

## 2020-02-13 RX ORDER — CARVEDILOL 25 MG/1
25 TABLET ORAL 2 TIMES DAILY WITH MEALS
Qty: 180 TAB | Refills: 3 | Status: SHIPPED | OUTPATIENT
Start: 2020-02-13 | End: 2020-08-13 | Stop reason: SDUPTHER

## 2020-02-13 RX ORDER — ATORVASTATIN CALCIUM 80 MG/1
80 TABLET, FILM COATED ORAL DAILY
Qty: 90 TAB | Refills: 3 | Status: SHIPPED | OUTPATIENT
Start: 2020-02-13 | End: 2020-08-13 | Stop reason: SDUPTHER

## 2020-02-13 NOTE — PROGRESS NOTES
HISTORY OF PRESENT ILLNESS  Andrzej Plascencia is a 81 y.o. female. History of anterior wall MI complicated by cardiac arrest due to acute stent closure requiring repeat PCI to her LAD with 2 YADIRA 5/2018        Cardiomyopathy   The history is provided by the patient and medical records. This is a chronic problem. The current episode started more than 1 week ago. The problem occurs constantly. The problem has not changed since onset. Associated symptoms include shortness of breath. Pertinent negatives include no chest pain and no headaches. The symptoms are aggravated by exertion. The symptoms are relieved by rest.   CHF   The history is provided by the medical records. This is a chronic problem. Associated symptoms include shortness of breath. Pertinent negatives include no chest pain and no headaches. Hypertension   The history is provided by the patient and medical records. This is a chronic problem. The current episode started more than 1 week ago. Associated symptoms include shortness of breath. Pertinent negatives include no chest pain and no headaches. Cholesterol Problem   The history is provided by the medical records. This is a chronic problem. Associated symptoms include shortness of breath. Pertinent negatives include no chest pain and no headaches. Shortness of Breath   The history is provided by the patient. This is a chronic problem. The problem occurs intermittently. The current episode started more than 1 week ago. The problem has been gradually improving. Pertinent negatives include no fever, no headaches, no cough, no wheezing, no PND, no orthopnea, no chest pain, no vomiting, no rash, no leg swelling and no claudication. The problem's precipitants include exercise (1 flight; 10/19 50 ft; 2/20 steps). Review of Systems   Constitutional: Negative for chills, fever, malaise/fatigue and weight loss. HENT: Negative for nosebleeds. Eyes: Negative for discharge.    Respiratory: Positive for shortness of breath. Negative for cough and wheezing. Cardiovascular: Negative for chest pain, palpitations, orthopnea, claudication, leg swelling and PND. Gastrointestinal: Negative for diarrhea, nausea and vomiting. Genitourinary: Negative for dysuria and hematuria. Musculoskeletal: Negative for falls and joint pain. Skin: Negative for rash. Neurological: Negative for dizziness, seizures, loss of consciousness and headaches. Endo/Heme/Allergies: Negative for polydipsia. Does not bruise/bleed easily. Psychiatric/Behavioral: Negative for depression and substance abuse. The patient does not have insomnia. No Known Allergies    Past Medical History:   Diagnosis Date    Abnormal WBC count 2016    Bilateral shoulder pain 2016    Chondromalacia of both patellae     COPD (chronic obstructive pulmonary disease) [facility] 2015    mild-mod dz; Dr Reuben Hernandez    Diabetes [facility])     Diabetic eye exam ([facility]     [facility]     Dyslipidemia     Gout     [facility]     [facility] attack [facility]     History of echocardiogram 2014    Mild LVE. EF 40%. Mild, diffuse hypk. Mild LAE. Mild MR.      Hypercholesteremia 14    Hypertension 2000    Noncompliance with medications 2016    Obesity     Orthostatic hypotension 2016    Osteoarthritis of both knees     Pain management 2016    Dr. Luke Lozano Popliteal cyst, bilateral      Vitamin D deficiency 10/16/14       Family History   Problem Relation Age of Onset    Diabetes Mother     Hypertension Mother     Hypertension Father     Kidney Disease Maternal Aunt 48        Dialysis       Social History     Tobacco Use    Smoking status: Former Smoker     Packs/day: 0.25     Years: 48.00     Pack years: 12.00     Types: Cigarettes     Last attempt to quit: 2018     Years since quittin.7    Smokeless tobacco: Never Used   Substance Use Topics    Alcohol use:  No Alcohol/week: 0.0 standard drinks    Drug use: No        Current Outpatient Medications   Medication Sig    atorvastatin (LIPITOR) 40 mg tablet Take 1 Tab by mouth daily.  pantoprazole (PROTONIX) 40 mg tablet Take 1 Tab by mouth daily.  tizanidine HCl (TIZANIDINE PO) Take  by mouth.  furosemide (LASIX) 40 mg tablet Take 1 Tab by mouth two (2) times a day.  carvedilol (COREG) 6.25 mg tablet Take 1 Tab by mouth two (2) times daily (with meals).  albuterol (PROVENTIL HFA, VENTOLIN HFA, PROAIR HFA) 90 mcg/actuation inhaler Take 2 Puffs by inhalation every four (4) hours as needed for Wheezing.  sacubitril-valsartan (ENTRESTO) 97 mg/103 mg tablet Take 1 Tab by mouth two (2) times a day.  aspirin 81 mg chewable tablet Take 2 Tabs by mouth daily.  insulin detemir (LEVEMIR) 100 unit/mL injection 15 units daily for diabetes    desloratadine (CLARINEX) 5 mg tablet Take 1 Tab by mouth daily as needed for Allergies.  mometasone (NASONEX) 50 mcg/actuation nasal spray 2 Sprays by Both Nostrils route daily as needed. For allergies    umeclidinium-vilanterol (ANORO ELLIPTA) 62.5-25 mcg/actuation inhaler Take 1 Puff by inhalation daily. For COPD    ferrous sulfate 325 mg (65 mg iron) tablet Take 1 Tab by mouth two (2) times a day. Indications: IRON DEFICIENCY ANEMIA     No current facility-administered medications for this visit. Past Surgical History:   Procedure Laterality Date    HX HEART CATHETERIZATION      HX TUBAL LIGATION         Visit Vitals  /58 (BP 1 Location: Left arm, BP Patient Position: Sitting)   Pulse 85   Temp 98.5 °F (36.9 °C) (Oral)   Ht 5' 4\" (1.626 m)   Wt 105 kg (231 lb 6.4 oz)   SpO2 98%   BMI 39.72 kg/m²       Diagnostic Studies:  I have reviewed the relevant tests done on the patient and show as follows  EKG tracings reviewed by me today.     EKG Results     None        XR Results (most recent):  Results from Hospital Encounter encounter on 10/18/19   XR CHEST LORE PAINTER    Narrative EXAMINATION: Chest 2 views    INDICATION: Chest pain    COMPARISON: 10/21/2019    FINDINGS: Frontal and lateral views of the chest obtained. Mediastinal  silhouette normal in size. Aortic arch calcifications. Slightly indistinct  perihilar interstitium. Basilar streaky densities. No evidence of pneumothorax. No obvious acute osseous findings. Impression IMPRESSION:    Suspected mild interstitial infiltrate/edema. Minimal basilar streaky densities,  probably atelectasis, less likely infiltrate. 08/07/19   ECHO ADULT COMPLETE 08/07/2019 8/7/2019    Narrative · Left Ventricle: Normal cavity size and wall thickness. Mild systolic   dysfunction. Estimated left ventricular ejection fraction is 46 - 50%. Abnormal left ventricular wall motion. Moderate (grade 2) left ventricular   diastolic dysfunction. · Left Atrium: Moderately dilated left atrium. · Aortic Valve: Aortic valve sclerosis. · Mitral Valve: Mitral valve thickening. Moderate to severe mitral valve   regurgitation. · Tricuspid Valve: Mild tricuspid valve regurgitation is present. Pulmonary arterial systolic pressure is 10.6 mmHg. Moderate pulmonary   hypertension. · Pulmonic Valve: Mild pulmonic valve regurgitation is present. · IVC/Hepatic Veins: Severely elevated central venous pressure (15+ mmHg);   IVC diameter is larger than 21 mm and collapses less than 50% with   respiration. Signed by: Kj Pete MD       08/25/19   NUCLEAR CARDIAC STRESS TEST 08/28/2019 8/28/2019    Narrative · Baseline ECG: Normal sinus rhythm, occasional PACs. · Gated SPECT: Left ventricular function post-stress was abnormal.   Calculated ejection fraction is 44%. The TID ratio is 0.94.   · Negative stress test.  · Left ventricular perfusion is abnormal.  · Myocardial perfusion imaging defect 1: There is a defect that is   moderate in size with a severe reduction in uptake present in the   mid-apical anterior and apex location(s) that is non-reversible. There is   abnormal wall motion in the defect area. Viability in the area is poor. The defect appears to be infarction. Perfusion defect was visually present   without quantitative evidence. · Positive myocardial perfusion imaging. Myocardial perfusion imaging   supports an intermediate risk stress test.        Signed by: Teresita Tyson MD        10/18/19   ECHO ADULT FOLLOW-UP OR LIMITED 10/21/2019 10/21/2019    Narrative · Left Ventricle: Mildly dilated left ventricle. Mild concentric   hypertrophy. Mild-to-moderate systolic dysfunction. Calculated left   ventricular ejection fraction is 46%. Biplane method used to measure   ejection fraction. · Mitral Valve: Mitral valve thickening. Mitral annular calcification. Moderate mitral valve regurgitation is present. The mitral regurgitant jet   is eccentric. · Interatrial Septum: Agitated saline contrast study was performed. No   obvious septal defect. Signed by: Satinder Ramirez MD     10/18/19   CARDIAC PROCEDURE 10/25/2019 10/25/2019    Narrative Patent stent in proximal LAD. Mildly reduced LV function. Continue intense risk factor modification. Signed by: Satinder Ramirez MD           No flowsheet data found. Ms. Nicolas Velásquez has a reminder for a \"due or due soon\" health maintenance. I have asked that she contact her primary care provider for follow-up on this health maintenance. Physical Exam   Constitutional: She is oriented to person, place, and time. She appears well-developed and well-nourished. Neck: No JVD present. Cardiovascular: Normal rate, regular rhythm, normal heart sounds and intact distal pulses. Exam reveals no gallop and no friction rub. No murmur heard. Pulmonary/Chest: Effort normal and breath sounds normal. No respiratory distress. She has no wheezes. She has no rales. She exhibits no tenderness. Abdominal: Soft. She exhibits no distension and no mass.  There is no abdominal tenderness. Musculoskeletal: Normal range of motion. General: No edema. Neurological: She is alert and oriented to person, place, and time. Skin: Skin is warm and dry. Psychiatric: She has a normal mood and affect. ASSESSMENT and PLAN      HLD : Results for Elroy Gilliam (MRN [id number]) as of 10/11/2019 12:54   Ref. Range 9/10/2019 00:00 9/30/2019 17:00 10/10/2019 00:00   Triglyceride Latest Ref Range: 0 - 149 mg/dL 67  101   Cholesterol, total Latest Ref Range: 100 - 199 mg/dL 168  152   HDL Cholesterol Latest Ref Range: >39 mg/dL 55  46   LDL, calculated Latest Ref Range: 0 - 99 mg/dL 100 (H)  86   VLDL, calculated Latest Ref Range: 5 - 40 mg/dL 13  20     Mitral regurgitation: 10/19 moderate;    CHF is improving well. Advised patient to skip Lasix if there is no swelling. Follow-up electrolytes periodically. Increase Lipitor to 80 to try to reduce the LDL further and follow the labs. She will try to lose weight. Diet weight and exercise discussed and she is trying to follow. Diagnoses and all orders for this visit:    1. Chronic systolic congestive heart failure (HCC)  -     carvediloL (COREG) 25 mg tablet; Take 1 Tab by mouth two (2) times daily (with meals). -     METABOLIC PANEL, BASIC; Future  -     furosemide (LASIX) 40 mg tablet; Take 1 Tab by mouth two (2) times daily as needed (edema). 2. Essential hypertension  -     carvediloL (COREG) 25 mg tablet; Take 1 Tab by mouth two (2) times daily (with meals). 3. Coronary artery disease involving native coronary artery of native heart without angina pectoris    4. Severe obesity (BMI 35.0-39. 9) with comorbidity ([address]    5. Ischemic cardiomyopathy    6. Dyslipidemia, goal LDL below 70  -     atorvastatin (LIPITOR) 80 mg tablet; Take 1 Tab by mouth daily.  -     LIPID PANEL; Future  -     HEPATIC FUNCTION PANEL; Future        Pertinent laboratory and test data reviewed and discussed with patient.   See patient instructions also for other medical advice given    Medications Discontinued During This Encounter   Medication Reason    predniSONE (STERAPRED DS) 10 mg dose pack     atorvastatin (LIPITOR) 40 mg tablet Reorder    carvedilol (COREG) 6.25 mg tablet Reorder    furosemide (LASIX) 40 mg tablet Reorder       Follow-up and Dispositions    · Return in about 6 months (around 8/13/2020), or if symptoms worsen or fail to improve, for post test.

## 2020-02-13 NOTE — PROGRESS NOTES
1. Have you been to the ER, urgent care clinic since your last visit? Hospitalized since your last visit? Yes When: 12/8/19 Where: [facility] Reason for visit: Finger/Wrist Pain    2. Have you seen or consulted any other health care providers outside of the [address] [place] [address] since your last visit? Include any pap smears or colon screening. No     3. Since your last visit, have you had any of the following symptoms? No    4. Have you had any blood work, X-rays or cardiac testing? No      5. Where do you normally have your labs drawn? [facility]    6. Do you need any refills today?    Yes

## 2020-02-13 NOTE — PATIENT INSTRUCTIONS
Medications Discontinued During This Encounter   Medication Reason    predniSONE (STERAPRED DS) 10 mg dose pack     atorvastatin (LIPITOR) 40 mg tablet Reorder    carvedilol (COREG) 6.25 mg tablet Reorder          Avoiding Triggers With Heart Failure: Care Instructions  Your Care Instructions    Triggers are anything that make your heart failure flare up. A flare-up is also called \"sudden heart failure\" or \"acute heart failure. \" When you have a flare-up, fluid builds up in your lungs, and you have problems breathing. You might need to go to the hospital. By watching for changes in your condition and avoiding triggers, you can prevent heart failure flare-ups. Follow-up care is a key part of your treatment and safety. Be sure to make and go to all appointments, and call your doctor if you are having problems. It's also a good idea to know your test results and keep a list of the medicines you take. How can you care for yourself at home? Watch for changes in your weight and condition  · Weigh yourself without clothing at the same time each day. Record your weight. Call your doctor if you have sudden weight gain, such as more than 2 to 3 pounds in a day or 5 pounds in a week. (Your doctor may suggest a different range of weight gain.) A sudden weight gain may mean that your heart failure is getting worse. · Keep a daily record of your symptoms. Write down any changes in how you feel, such as new shortness of breath, cough, or problems eating. Also record if your ankles are more swollen than usual and if you feel more tired than usual. Note anything that you ate or did that could have triggered these changes. Limit sodium  Sodium causes your body to hold on to extra water. This may cause your heart failure symptoms to get worse. People get most of their sodium from processed foods. Fast food and restaurant meals also tend to be very high in sodium.   · Your doctor may suggest that you limit sodium to 2,000 milligrams (mg) a day or less. That is less than 1 teaspoon of salt a day, including all the salt you eat in cooking or in packaged foods. · Read food labels on cans and food packages. They tell you how much sodium you get in one serving. Check the serving size. If you eat more than one serving, you are getting more sodium. · Be aware that sodium can come in forms other than salt, including monosodium glutamate (MSG), sodium citrate, and sodium bicarbonate (baking soda). MSG is often added to Asian food. You can sometimes ask for food without MSG or salt. · Slowly reducing salt will help you adjust to the taste. Take the salt shaker off the table. · Flavor your food with garlic, lemon juice, onion, vinegar, herbs, and spices instead of salt. Do not use soy sauce, steak sauce, onion salt, garlic salt, mustard, or ketchup on your food, unless it is labeled \"low-sodium\" or \"low-salt. \"  · Make your own salad dressings, sauces, and ketchup without adding salt. · Use fresh or frozen ingredients, instead of canned ones, whenever you can. Choose low-sodium canned goods. · Eat less processed food and food from restaurants, including fast food. Exercise as directed  Moderate, regular exercise is very good for your heart. It improves your blood flow and helps control your weight. But too much exercise can stress your heart and cause a heart failure flare-up. · Check with your doctor before you start an exercise program.  · Walking is an easy way to get exercise. Start out slowly. Gradually increase the length and pace of your walk. Swimming, riding a bike, and using a treadmill are also good forms of exercise. · When you exercise, watch for signs that your heart is working too hard. You are pushing yourself too hard if you cannot talk while you are exercising. If you become short of breath or dizzy or have chest pain, stop, sit down, and rest.  · Do not exercise when you do not feel well.   Take medicines correctly  · Take your medicines exactly as prescribed. Call your doctor if you think you are having a problem with your medicine. · Make a list of all the medicines you take. Include those prescribed to you by other doctors and any over-the-counter medicines, vitamins, or supplements you take. Take this list with you when you go to any doctor. · Take your medicines at the same time every day. It may help you to post a list of all the medicines you take every day and what time of day you take them. · Make taking your medicine as simple as you can. Plan times to take your medicines when you are doing other things, such as eating a meal or getting ready for bed. This will make it easier to remember to take your medicines. · Get organized. Use helpful tools, such as daily or weekly pill containers. When should you call for help? Call 911 if you have symptoms of sudden heart failure such as:    · You have severe trouble breathing.     · You cough up pink, foamy mucus.     · You have a new irregular or rapid heartbeat.    Call your doctor now or seek immediate medical care if:    · You have new or increased shortness of breath.     · You are dizzy or lightheaded, or you feel like you may faint.     · You have sudden weight gain, such as more than 2 to 3 pounds in a day or 5 pounds in a week. (Your doctor may suggest a different range of weight gain.)     · You have increased swelling in your legs, ankles, or feet.     · You are suddenly so tired or weak that you cannot do your usual activities.    Watch closely for changes in your health, and be sure to contact your doctor if you develop new symptoms. Where can you learn more? Go to http://jason-charity.info/. Enter V325 in the search box to learn more about \"Avoiding Triggers With Heart Failure: Care Instructions. \"  Current as of: April 9, 2019  Content Version: 12.2  © 2701-2656 [employer].  Care instructions adapted under license by [address] (which disclaims liability or warranty for this information). If you have questions about a medical condition or this instruction, always ask your healthcare professional. [address] any warranty or liability for your use of this information. Body Mass Index: Care Instructions  Your Care Instructions    Body mass index (BMI) can help you see if your weight is raising your risk for health problems. It uses a formula to compare how much you weigh with how tall you are. · A BMI lower than 18.5 is considered underweight. · A BMI between 18.5 and 24.9 is considered healthy. · A BMI between 25 and 29.9 is considered overweight. A BMI of 30 or higher is considered obese. If your BMI is in the normal range, it means that you have a lower risk for weight-related health problems. If your BMI is in the overweight or obese range, you may be at increased risk for weight-related health problems, such as high blood pressure, heart disease, stroke, arthritis or joint pain, and diabetes. If your BMI is in the underweight range, you may be at increased risk for health problems such as fatigue, lower protection (immunity) against illness, muscle loss, bone loss, hair loss, and hormone problems. BMI is just one measure of your risk for weight-related health problems. You may be at higher risk for health problems if you are not active, you eat an unhealthy diet, or you drink too much alcohol or use tobacco products. Follow-up care is a key part of your treatment and safety. Be sure to make and go to all appointments, and call your doctor if you are having problems. It's also a good idea to know your test results and keep a list of the medicines you take. How can you care for yourself at home? · Practice healthy eating habits. This includes eating plenty of fruits, vegetables, whole grains, lean protein, and low-fat dairy.   · If your doctor recommends it, get more exercise. Walking is a good choice. Bit by bit, increase the amount you walk every day. Try for at least 30 minutes on most days of the week. · Do not smoke. Smoking can increase your risk for health problems. If you need help quitting, talk to your doctor about stop-smoking programs and medicines. These can increase your chances of quitting for good. · Limit alcohol to 2 drinks a day for men and 1 drink a day for women. Too much alcohol can cause health problems. If you have a BMI higher than 25  · Your doctor may do other tests to check your risk for weight-related health problems. This may include measuring the distance around your waist. A waist measurement of more than 40 inches in men or 35 inches in women can increase the risk of weight-related health problems. · Talk with your doctor about steps you can take to stay healthy or improve your health. You may need to make lifestyle changes to lose weight and stay healthy, such as changing your diet and getting regular exercise. If you have a BMI lower than 18.5  · Your doctor may do other tests to check your risk for health problems. · Talk with your doctor about steps you can take to stay healthy or improve your health. You may need to make lifestyle changes to gain or maintain weight and stay healthy, such as getting more healthy foods in your diet and doing exercises to build muscle. Where can you learn more? Go to http://jason-charity.info/. Enter S176 in the search box to learn more about \"Body Mass Index: Care Instructions. \"  Current as of: March 28, 2019  Content Version: 12.2  © 1007-1095 [employer]. Care instructions adapted under license by [employer] (which disclaims liability or warranty for this information).  If you have questions about a medical condition or this instruction, always ask your healthcare professional. [place] [address] any warranty or liability for your use of this information.

## 2020-02-16 DIAGNOSIS — I50.22 CHRONIC SYSTOLIC CONGESTIVE HEART FAILURE (HCC): ICD-10-CM

## 2020-02-17 RX ORDER — SACUBITRIL AND VALSARTAN 97; 103 MG/1; MG/1
TABLET, FILM COATED ORAL
Qty: 60 TAB | Refills: 6 | Status: SHIPPED | OUTPATIENT
Start: 2020-02-17 | End: 2020-08-13 | Stop reason: SDUPTHER

## 2020-03-30 ENCOUNTER — TELEPHONE (OUTPATIENT)
Dept: ORTHOPEDIC SURGERY | Facility: CLINIC | Age: 70
End: 2020-03-30

## 2020-03-30 NOTE — TELEPHONE ENCOUNTER
Spoke with patient to reschedule her to after May 26th since she has not started Visco injections yet. She states Cortisone does not help and she is having a lot of pain in both knees. She wanted to know if there was something Dr. Alyssa Nichols could phone into pharmacy for her to help her since OTC's do not seem to provide her any relief. Please call patient at [phone].

## 2020-04-01 NOTE — TELEPHONE ENCOUNTER
Patient advised unable to prescribe any meds--can take tylenol, advil OTC--pt can make appt when we start seeing patients again.

## 2020-04-01 NOTE — TELEPHONE ENCOUNTER
Dr Juan Navarro reviewed notes--unable to prescribe any meds--can take tylenol, advil--pt can make appt when we start seeing patients again

## 2020-05-17 NOTE — PROGRESS NOTES
Hematology/Oncology Consultation Note      Date: 2020    Name: Agata Vargas  : 1950        Conrado Huerta MD         Subjective:     Chief complaint: Anemia    History of Present Illness:   Ms. Jihan Watkins is a most pleasant 81y.o. year old female who was seen for consultation of anemia. The patient has a past medical history significant of myocardial infarction and CHF, chronic kidney disease stage III, type 2 diabetes, iron deficiency anemia, both knee arthritis. She has been taking iron pills for her iron deficiency. She noted having some constipation with iron pills. Lab reviews indicated chronic anemia since at least 2011. The patient reported chronic fatigue, low energy level, and generalized weakness for months. She also has episodes of shortness of breath on exertion. She denied any obvious bleeding. The patient otherwise has no other complaints. Denied fever, chills, night sweat, unintentional weight loss, skin lumps or bumps, acute bleeding or bruising issues. No acute bleeding, blood in stool, dark stool, melena, hematochezia, hemoptysis, dark urine, or easily bruising. Denied headache, acute vision change, dizziness, chest pain, palpitation, productive cough, nausea, vomiting, abdominal pain, altered bowel habits, dysuria, new bone pain or back pain, focal numbness or weakness. Independent with ADLs and IADLs. Oncologic History:   No history exists. Past Medical History, Family History, and Social History:    Past Medical History:   Diagnosis Date    Abnormal WBC count 2016    Bilateral shoulder pain 2016    Chondromalacia of both patellae     COPD (chronic obstructive pulmonary disease) ([facility] 2015    mild-mod dz; Dr Mariam Scott    Diabetes [facility])     Diabetic eye exam ([facility] 2016    Dilated cardiomyopathy ([facility]     Dyslipidemia     Gout     Heart attack ([facility]     Heart attack ([facility]     History of echocardiogram 2014    Mild LVE. EF 40%. Mild, diffuse hypk. Mild LAE.   Mild MRSamara      Hypercholesteremia 14    Hypertension 2000    Noncompliance with medications 2016    Obesity     Orthostatic hypotension 2016    Osteoarthritis of both knees     Pain management 2016    Dr. Shaun Prather Popliteal cyst, bilateral      Vitamin D deficiency 10/16/14     Past Surgical History:   Procedure Laterality Date    HX HEART CATHETERIZATION      HX TUBAL LIGATION       Social History     Socioeconomic History    Marital status: LEGALLY      Spouse name: Not on file    Number of children: 3    Years of education: Not on file    Highest education level: Not on file   Occupational History    Occupation: retired   Social Needs    Financial resource strain: Not on file    Food insecurity     Worry: Not on file     Inability: Not on file   [employer] needs     Medical: Not on file     Non-medical: Not on file   Tobacco Use    Smoking status: Former Smoker     Packs/day: 0.25     Years: 48.00     Pack years: 12.00     Types: Cigarettes     Last attempt to quit: 2018     Years since quittin.9    Smokeless tobacco: Never Used   Substance and Sexual Activity    Alcohol use: No     Alcohol/week: 0.0 standard drinks    Drug use: No    Sexual activity: Yes     Partners: Male   Lifestyle    Physical activity     Days per week: Not on file     Minutes per session: Not on file    Stress: Not on file   Relationships    Social connections     Talks on phone: Not on file     Gets together: Not on file     Attends Mandaen service: Not on file     Active member of club or organization: Not on file     Attends meetings of clubs or organizations: Not on file     Relationship status: Not on file    Intimate partner violence     Fear of current or ex partner: Not on file     Emotionally abused: Not on file     Physically abused: Not on file     Forced sexual activity: Not on file   Other Topics Concern   Mobile Event [employer] Service No    Blood Transfusions No    Caffeine Concern No    Occupational Exposure No    Hobby Hazards No    Sleep Concern No    Stress Concern No    Weight Concern No    Special Diet No    Back Care No    Exercise No    Bike Helmet No    Seat Belt Yes    Self-Exams Yes   Social History Narrative    Not on file     Family History   Problem Relation Age of Onset    Diabetes Mother     Hypertension Mother     Hypertension Father     Kidney Disease Maternal Aunt 50        Dialysis     Current Outpatient Medications   Medication Sig Dispense Refill    ENTRESTO  mg tablet TAKE 1 TABLET BY MOUTH 2 TIMES A DAY 60 Tab 6    atorvastatin (LIPITOR) 80 mg tablet Take 1 Tab by mouth daily. 90 Tab 3    carvediloL (COREG) 25 mg tablet Take 1 Tab by mouth two (2) times daily (with meals). 180 Tab 3    furosemide (LASIX) 40 mg tablet Take 1 Tab by mouth two (2) times daily as needed (edema). 180 Tab 1    pantoprazole (PROTONIX) 40 mg tablet Take 1 Tab by mouth daily. 30 Tab 0    tizanidine HCl (TIZANIDINE PO) Take  by mouth.  albuterol (PROVENTIL HFA, VENTOLIN HFA, PROAIR HFA) 90 mcg/actuation inhaler Take 2 Puffs by inhalation every four (4) hours as needed for Wheezing. 1 Inhaler 0    aspirin 81 mg chewable tablet Take 2 Tabs by mouth daily. 60 Tab 0    insulin detemir (LEVEMIR) 100 unit/mL injection 15 units daily for diabetes 2 Vial 0    desloratadine (CLARINEX) 5 mg tablet Take 1 Tab by mouth daily as needed for Allergies. 30 Tab 0    mometasone (NASONEX) 50 mcg/actuation nasal spray 2 Sprays by Both Nostrils route daily as needed. For allergies 3 Container 3    umeclidinium-vilanterol (ANORO ELLIPTA) 62.5-25 mcg/actuation inhaler Take 1 Puff by inhalation daily. For COPD 3 Inhaler 3    ferrous sulfate 325 mg (65 mg iron) tablet Take 1 Tab by mouth two (2) times a day. Indications: IRON DEFICIENCY ANEMIA 60 Tab 11       Review of Systems   Constitutional: Positive for malaise/fatigue. Negative for chills, diaphoresis, fever and weight loss. Respiratory: Positive for shortness of breath. Negative for cough, hemoptysis and wheezing. Cardiovascular: Negative for chest pain, palpitations and leg swelling. Gastrointestinal: Negative for abdominal pain, diarrhea, heartburn, nausea and vomiting. Genitourinary: Negative for dysuria, frequency, hematuria and urgency. Musculoskeletal: Negative for joint pain and myalgias. Skin: Negative for itching and rash. Neurological: Negative for dizziness, seizures, weakness and headaches. Psychiatric/Behavioral: Negative for depression. The patient does not have insomnia. Objective:     Visit Vitals  /71 (BP Patient Position: Sitting)   Pulse 83   Temp 98.6 °F (37 °C) (Oral)   Resp 18   Wt 106.7 kg (235 lb 3.2 oz)   SpO2 97%   BMI 40.37 kg/m²       ECOG Performance Status (grade): 0  0 - able to carry on all pre-disease activity w/out restriction  1 - restricted but able to carry out light work  2 - ambulatory and can self- care but unable to carry out work  3 - bed or chair >50% of waking hours  4 - completely disable, total care, confined to bed or chair    Physical Exam  Constitutional:       Appearance: Normal appearance. Comments: Looks pale   HENT:      Head: Normocephalic and atraumatic. Eyes:      Pupils: Pupils are equal, round, and reactive to light. Neck:      Musculoskeletal: Neck supple. Cardiovascular:      Rate and Rhythm: Normal rate and regular rhythm. Heart sounds: Normal heart sounds. Pulmonary:      Effort: Pulmonary effort is normal.      Breath sounds: Normal breath sounds. Abdominal:      General: Bowel sounds are normal.      Palpations: Abdomen is soft. Tenderness: There is no abdominal tenderness. There is no guarding. Musculoskeletal: Normal range of motion. Right lower leg: No edema. Left lower leg: No edema. Skin:     General: Skin is warm.    Neurological: General: No focal deficit present. Mental Status: She is alert and oriented to person, place, and time. Mental status is at baseline. Diagnostics:      No results found for this or any previous visit (from the past 96 hour(s)). Imaging:  No results found for this or any previous visit. Results for orders placed during the hospital encounter of 10/18/19   XR CHEST PA LAT    Narrative EXAMINATION: Chest 2 views    INDICATION: Chest pain    COMPARISON: 10/21/2019    FINDINGS: Frontal and lateral views of the chest obtained. Mediastinal  silhouette normal in size. Aortic arch calcifications. Slightly indistinct  perihilar interstitium. Basilar streaky densities. No evidence of pneumothorax. No obvious acute osseous findings. Impression IMPRESSION:    Suspected mild interstitial infiltrate/edema. Minimal basilar streaky densities,  probably atelectasis, less likely infiltrate. Results for orders placed during the hospital encounter of 10/18/19   CTA CHEST W OR W WO CONT    Narrative EXAM: CTA CHEST W OR W WO CONT    INDICATIONS: SOB on oxygen    TECHNIQUE: Utilizing pulmonary embolus protocol, thin section axial images were  obtained from the thoracic inlet into the upper abdomen after the uneventful  administration of IV contrast. Coronal and sagittal maximum intensity projection  (MIP) post-processed images were generated to better define pulmonary artery  anatomy and enhance sensitivity for detection of pulmonary emboli. Contrast  used: 72 cc [id number]    CT scans at this facility are performed using dose optimization technique as  appropriate with performed exam, to include automated exposure control,  adjustment of mA and/or kV according to patient's size (including appropriate  matching for site-specific examinations), or use of iterative reconstruction  technique. COMPARISON: May 2019    FINDINGS:    Suboptimal quality opacification.  Decreased resolution due to body habitus. Contrast timing is of good quality however. Pulmonary arteries: No large/central PE. No suspicious peripheral focus. The  main pulmonary artery is dilated at 3.6 cm diameter. Mediastinum: No adenopathy. Normal caliber aorta with mild atherosclerosis. Normal heart size without pericardial effusion. Enteric tube courses into the  stomach. ET tube unremarkable. Lungs/pleura: Respiratory motion artifact limits evaluation. Small to moderate  amount of ill-defined consolidation within the lower lungs. Groundglass  inflammation within the remainder of the lungs with some septal thickening. Small bilateral pleural effusions. Chest soft tissues: Unremarkable. Abdomen: Unremarkable. Bones: No acute findings. Unremarkable for age. Impression IMPRESSION[de-identified]    1.  Negative for acute PE.  -Enlarged main pulmonary artery as can be seen with pulmonary arterial  hypertension; appearance as before    2. Ill-defined bilateral lung consolidation, likely pneumonia. There is an  element of fluid overload with lung edema as well as small pleural effusions. Assessment:                                        1. Chronic anemia    2. History of iron deficiency anemia        Plan:                                        # Normocytic anemia  # history of iron deficiency anemia, on iron pills  -- Lab reviews indicated chronic anemia since at least 4/21/2011.  -- Clinically significant of generalized weakness and episodes of shortness of breath on exertion. --Today I have reviewed with the patient about the diagnosis. The differential diagnosis of normochromic normocytic anemia includes a variety of diverse diagnoses. It may be seen with early iron deficiency, anemia of chronic disease, combined deficiency state (iron deficiency combined with either B12 or folate deficiency), acute blood loss, monoclonal gammopathy, myelodysplastic syndrome, renal failure, liver failure.  The patients history and physical examination allow some of these diagnoses to be excluded. The peripheral smear will be reviewed for any clues. -- Initial laboratories that should be checked include comprehensive metabolic profile, reticulocyte count, Iron study with ferritin, B12, folate. Roughly 70% of early cases of monoclonal gammopathy have a normochromic normocytic anemia, so I will also check a SPEP and SFLC. Further workup will be guided by results from aforementioned initial study. -- The patient will be notified if any interventions is warranted. I have explained to the patient if persistently iron deficiency anemia, patient might need IV iron infusion for a fast response. I have discussed potential side effect of IV iron, patient was agreeable with the plan. -- I will see the patient back in clinic in about 3 weeks. Always sooner if required. The patient can have lab done prior our next clinic visit.       Orders Placed This Encounter    METABOLIC PANEL, COMPREHENSIVE     Standing Status:   Future     Number of Occurrences:   1     Standing Expiration Date:   5/22/2021    IRON PROFILE     Standing Status:   Future     Number of Occurrences:   1     Standing Expiration Date:   5/22/2021    FERRITIN     Standing Status:   Future     Number of Occurrences:   1     Standing Expiration Date:   5/21/2021    CBC WITH AUTOMATED DIFF     Standing Status:   Future     Number of Occurrences:   1     Standing Expiration Date:   5/22/2021    RETICULOCYTE COUNT     Standing Status:   Future     Number of Occurrences:   1     Standing Expiration Date:   5/22/2021    C REACTIVE PROTEIN, QT     Standing Status:   Future     Number of Occurrences:   1     Standing Expiration Date:   5/21/2021    SED RATE (ESR)     Standing Status:   Future     Number of Occurrences:   1     Standing Expiration Date:   5/21/2021    VITAMIN B12 & FOLATE     Standing Status:   Future     Number of Occurrences:   1     Standing Expiration Date: 5/21/2021    GAMMOPATHY EVAL, SPEP/JOANIE, IG QT/FLC     Standing Status:   Future     Number of Occurrences:   1     Standing Expiration Date:   5/21/2021           Ms. Kike Colon has a reminder for a \"due or due soon\" health maintenance. I have asked that she contact her primary care provider for follow-up on this health maintenance. All of patient's questions answered to their apparent satisfaction. They verbally show understanding and agreement with aforementioned plan. I would like to thank Dr Stone Winter MD  for allowing me to participate in the care of this very pleasant patient. If I can be of further assistance please do not hesitate to call. Erin Anne MD  5/16/2020          About 45 minutes were spent for this encounter with more than 50% of the time spent in face-to-face counseling, discussing on diagnosis and management plan going forward, and co-ordination of care. Parts of this document has been produced using Dragon dictation system. Unrecognized errors in transcription may be present. Please do not hesitate to reach out for any questions or clarifications.         CC: Stone Winter MD

## 2020-05-21 ENCOUNTER — HOSPITAL ENCOUNTER (OUTPATIENT)
Dept: LAB | Age: 70
Discharge: HOME | End: 2020-05-21
Payer: MEDICARE

## 2020-05-21 ENCOUNTER — OFFICE VISIT (OUTPATIENT)
Dept: ONCOLOGY | Age: 70
End: 2020-05-21

## 2020-05-21 VITALS
HEART RATE: 83 BPM | WEIGHT: 235.2 LBS | RESPIRATION RATE: 18 BRPM | SYSTOLIC BLOOD PRESSURE: 152 MMHG | TEMPERATURE: 98.6 F | BODY MASS INDEX: 40.37 KG/M2 | OXYGEN SATURATION: 97 % | DIASTOLIC BLOOD PRESSURE: 71 MMHG

## 2020-05-21 DIAGNOSIS — Z86.2 HISTORY OF IRON DEFICIENCY ANEMIA: ICD-10-CM

## 2020-05-21 DIAGNOSIS — D64.9 CHRONIC ANEMIA: ICD-10-CM

## 2020-05-21 DIAGNOSIS — D64.9 CHRONIC ANEMIA: Primary | ICD-10-CM

## 2020-05-21 LAB
ALBUMIN SERPL-MCNC: 3.8 G/DL (ref 3.4–5)
ALBUMIN/GLOB SERPL: 1.2 {RATIO} (ref 0.8–1.7)
ALP SERPL-CCNC: 152 U/L (ref 45–117)
ALT SERPL-CCNC: 19 U/L (ref 13–56)
ANION GAP SERPL CALC-SCNC: 7 MMOL/L (ref 3–18)
AST SERPL-CCNC: 15 U/L (ref 10–38)
BASOPHILS # BLD: 0 K/UL (ref 0–0.1)
BASOPHILS NFR BLD: 0 % (ref 0–2)
BILIRUB SERPL-MCNC: 0.5 MG/DL (ref 0.2–1)
BUN SERPL-MCNC: 29 MG/DL (ref 7–18)
BUN/CREAT SERPL: 20 (ref 12–20)
CALCIUM SERPL-MCNC: 9.1 MG/DL (ref 8.5–10.1)
CHLORIDE SERPL-SCNC: 112 MMOL/L (ref 100–111)
CO2 SERPL-SCNC: 23 MMOL/L (ref 21–32)
CREAT SERPL-MCNC: 1.43 MG/DL (ref 0.6–1.3)
CRP SERPL-MCNC: 0.8 MG/DL (ref 0–0.3)
DIFFERENTIAL METHOD BLD: ABNORMAL
EOSINOPHIL # BLD: 0.1 K/UL (ref 0–0.4)
EOSINOPHIL NFR BLD: 2 % (ref 0–5)
ERYTHROCYTE [DISTWIDTH] IN BLOOD BY AUTOMATED COUNT: 16.3 % (ref 11.6–14.5)
ERYTHROCYTE [SEDIMENTATION RATE] IN BLOOD: 13 MM/HR (ref 0–30)
FERRITIN SERPL-MCNC: 17 NG/ML (ref 8–388)
FOLATE SERPL-MCNC: 10.8 NG/ML (ref 3.1–17.5)
GLOBULIN SER CALC-MCNC: 3.2 G/DL (ref 2–4)
GLUCOSE SERPL-MCNC: 162 MG/DL (ref 74–99)
HCT VFR BLD AUTO: 37.5 % (ref 35–45)
HGB BLD-MCNC: 11.5 G/DL (ref 12–16)
IRON SATN MFR SERPL: 19 % (ref 20–50)
IRON SERPL-MCNC: 69 UG/DL (ref 50–175)
LYMPHOCYTES # BLD: 2.2 K/UL (ref 0.9–3.6)
LYMPHOCYTES NFR BLD: 39 % (ref 21–52)
MCH RBC QN AUTO: 26.7 PG (ref 24–34)
MCHC RBC AUTO-ENTMCNC: 30.7 G/DL (ref 31–37)
MCV RBC AUTO: 87.2 FL (ref 74–97)
MONOCYTES # BLD: 0.6 K/UL (ref 0.05–1.2)
MONOCYTES NFR BLD: 10 % (ref 3–10)
NEUTS SEG # BLD: 2.8 K/UL (ref 1.8–8)
NEUTS SEG NFR BLD: 49 % (ref 40–73)
PLATELET # BLD AUTO: 343 K/UL (ref 135–420)
PMV BLD AUTO: 10.5 FL (ref 9.2–11.8)
POTASSIUM SERPL-SCNC: 4.6 MMOL/L (ref 3.5–5.5)
PROT SERPL-MCNC: 7 G/DL (ref 6.4–8.2)
RBC # BLD AUTO: 4.3 M/UL (ref 4.2–5.3)
RETICS/RBC NFR AUTO: 2.1 % (ref 0.5–2.3)
SODIUM SERPL-SCNC: 142 MMOL/L (ref 136–145)
TIBC SERPL-MCNC: 355 UG/DL (ref 250–450)
VIT B12 SERPL-MCNC: 215 PG/ML (ref 211–911)
WBC # BLD AUTO: 5.6 K/UL (ref 4.6–13.2)

## 2020-05-21 PROCEDURE — 82784 ASSAY IGA/IGD/IGG/IGM EACH: CPT

## 2020-05-21 PROCEDURE — 82728 ASSAY OF FERRITIN: CPT

## 2020-05-21 PROCEDURE — 36415 COLL VENOUS BLD VENIPUNCTURE: CPT

## 2020-05-21 PROCEDURE — 85025 COMPLETE CBC W/AUTO DIFF WBC: CPT

## 2020-05-21 PROCEDURE — 80053 COMPREHEN METABOLIC PANEL: CPT

## 2020-05-21 PROCEDURE — 85652 RBC SED RATE AUTOMATED: CPT

## 2020-05-21 PROCEDURE — 82607 VITAMIN B-12: CPT

## 2020-05-21 PROCEDURE — 83540 ASSAY OF IRON: CPT

## 2020-05-21 PROCEDURE — 85045 AUTOMATED RETICULOCYTE COUNT: CPT

## 2020-05-21 PROCEDURE — 86140 C-REACTIVE PROTEIN: CPT

## 2020-05-21 NOTE — LETTER
5/23/20 Patient: Marianna Wen YOB: 1950 Date of Visit: 5/21/2020 Andreina Xie MD 
[facility] [address] VIA Facsimile: [phone] Dear Andreina Xie MD, Thank you for referring Ms. Marianna Wen to [facility] for evaluation. My notes for this consultation are attached. If you have questions, please do not hesitate to call me. I look forward to following your patient along with you. Sincerely, Dmitriy Gamez MD

## 2020-05-26 LAB
ALBUMIN SERPL ELPH-MCNC: 3.8 G/DL (ref 2.9–4.4)
ALBUMIN/GLOB SERPL: 1.4 {RATIO} (ref 0.7–1.7)
ALPHA1 GLOB SERPL ELPH-MCNC: 0.2 G/DL (ref 0–0.4)
ALPHA2 GLOB SERPL ELPH-MCNC: 1.2 G/DL (ref 0.4–1)
B-GLOBULIN SERPL ELPH-MCNC: 0.9 G/DL (ref 0.7–1.3)
GAMMA GLOB SERPL ELPH-MCNC: 0.6 G/DL (ref 0.4–1.8)
GLOBULIN SER-MCNC: 2.9 G/DL (ref 2.2–3.9)
IGA SERPL-MCNC: 110 MG/DL (ref 87–352)
IGG SERPL-MCNC: 665 MG/DL (ref 586–1602)
IGM SERPL-MCNC: 98 MG/DL (ref 26–217)
INTERPRETATION SERPL IEP-IMP: ABNORMAL
KAPPA LC FREE SER-MCNC: 19.8 MG/L (ref 3.3–19.4)
KAPPA LC FREE/LAMBDA FREE SER: 1.92 {RATIO} (ref 0.26–1.65)
LAMBDA LC FREE SERPL-MCNC: 10.3 MG/L (ref 5.7–26.3)
M PROTEIN SERPL ELPH-MCNC: ABNORMAL G/DL
PROT SERPL-MCNC: 6.7 G/DL (ref 6–8.5)

## 2020-05-28 ENCOUNTER — OFFICE VISIT (OUTPATIENT)
Dept: ORTHOPEDIC SURGERY | Facility: CLINIC | Age: 70
End: 2020-05-28

## 2020-05-28 VITALS — TEMPERATURE: 97.5 F | HEIGHT: 64 IN | WEIGHT: 231.8 LBS | BODY MASS INDEX: 39.57 KG/M2

## 2020-05-28 DIAGNOSIS — M25.561 CHRONIC PAIN OF RIGHT KNEE: ICD-10-CM

## 2020-05-28 DIAGNOSIS — G89.29 CHRONIC PAIN OF LEFT KNEE: ICD-10-CM

## 2020-05-28 DIAGNOSIS — M17.11 LOCALIZED OSTEOARTHRITIS OF RIGHT KNEE: ICD-10-CM

## 2020-05-28 DIAGNOSIS — M25.562 CHRONIC PAIN OF LEFT KNEE: ICD-10-CM

## 2020-05-28 DIAGNOSIS — G89.29 CHRONIC PAIN OF RIGHT KNEE: ICD-10-CM

## 2020-05-28 DIAGNOSIS — M17.12 LOCALIZED OSTEOARTHRITIS OF LEFT KNEE: Primary | ICD-10-CM

## 2020-05-28 RX ORDER — BETAMETHASONE SODIUM PHOSPHATE AND BETAMETHASONE ACETATE 3; 3 MG/ML; MG/ML
6 INJECTION, SUSPENSION INTRA-ARTICULAR; INTRALESIONAL; INTRAMUSCULAR; SOFT TISSUE ONCE
Qty: 0.5 ML | Refills: 0
Start: 2020-05-28 | End: 2020-05-28

## 2020-05-28 NOTE — PROGRESS NOTES
Patient: Fariha Allen                MRN: [id number]       SSN: [id number]  YOB: 1950        AGE: 81 y.o. SEX: female      PCP: Marta Crockett MD  20    Chief Complaint   Patient presents with    Knee Pain     bilateral     HISTORY:  Fariha Allen is a 81 y.o. female who is seen for increased bilateral knee pain. She has been experiencing knee pain for the past several years. She does not recall any injury. She notes pain with standing, walking and stair climbing. She experiences startup pain after sitting. She states nothing relieves her pain. Pain Assessment  2020   Location of Pain Knee   Location Modifiers Left;Right   Severity of Pain 10   Quality of Pain Sharp   Quality of Pain Comment -   Duration of Pain Persistent   Frequency of Pain Constant   Aggravating Factors Squatting;Standing;Walking;Stairs   Aggravating Factors Comment Pain with any movement even sitting down   Limiting Behavior -   Relieving Factors Rest;Ice;Heat;Elevation   Result of Injury No   Work-Related Injury -   Type of Injury -   Type of Injury Comment -     Occupation, etc:Ms. Yoana Nickerson receives Social Security Disability Benefits for COPD and her knees. She moved from [place] to [facility] with her  when her mother  several years ago. She moved from [facility] to [place] 10 years ago. She and her  live in [place]. She has 3 sons--1 in [place] and 2 in [facility] [place] and 13 grandchildren. She has 2 Chihuahua-Poodle mixes--Frisky and Boots. She gained weight recently. She is diabetic. She is 231 pounds and 5'4\" tall. Last 3 Recorded Weights in this Encounter    20 1353   Weight: 231 lb 12.8 oz (105.1 kg)     Body mass index is 39.79 kg/m². REVIEW OF SYSTEMS: All Below are Negative except: See HPI     Constitutional: negative for fever, chills, and weight loss.    Cardiovascular: negative for chest pain, claudication, leg swelling, SOB, PIERSON   Gastrointestinal: Negative for pain, N/V/C/D, Blood in stool or urine, dysuria,  hematuria, incontinence, pelvic pain. Musculoskeletal: See HPI   Neurological: Negative for dizziness and weakness. Negative for headaches, Visual changes, confusion, seizures   Phychiatric/Behavioral: Negative for depression, memory loss, substance  abuse. Extremities: Negative for hair changes, rash, or skin lesion changes. Hematologic: Negative for bleeding problems, bruising, pallor or swollen lymph  nodes   Peripheral Vascular: No calf pain, no circulation deficits.     Social History     Socioeconomic History    Marital status: LEGALLY      Spouse name: Not on file    Number of children: 3    Years of education: Not on file    Highest education level: Not on file   Occupational History    Occupation: retired   Social Needs    Financial resource strain: Not on file    Food insecurity     Worry: Not on file     Inability: Not on file   [employer] needs     Medical: Not on file     Non-medical: Not on file   Tobacco Use    Smoking status: Former Smoker     Packs/day: 0.25     Years: 48.00     Pack years: 12.00     Types: Cigarettes     Last attempt to quit: 2018     Years since quittin.0    Smokeless tobacco: Never Used   Substance and Sexual Activity    Alcohol use: No     Alcohol/week: 0.0 standard drinks    Drug use: No    Sexual activity: Yes     Partners: Male   Lifestyle    Physical activity     Days per week: Not on file     Minutes per session: Not on file    Stress: Not on file   Relationships    Social connections     Talks on phone: Not on file     Gets together: Not on file     Attends Orthodox service: Not on file     Active member of club or organization: Not on file     Attends meetings of clubs or organizations: Not on file     Relationship status: Not on file    Intimate partner violence     Fear of current or ex partner: Not on file     Emotionally abused: Not on file     Physically abused: Not on file Forced sexual activity: Not on file   Other Topics Concern     Service No    Blood Transfusions No    Caffeine Concern No    Occupational Exposure No    Hobby Hazards No    Sleep Concern No    Stress Concern No    Weight Concern No    Special Diet No    Back Care No    Exercise No    Bike Helmet No    Seat Belt Yes    Self-Exams Yes   Social History Narrative    Not on file        No Known Allergies     Current Outpatient Medications   Medication Sig    ENTRESTO  mg tablet TAKE 1 TABLET BY MOUTH 2 TIMES A DAY    atorvastatin (LIPITOR) 80 mg tablet Take 1 Tab by mouth daily.  carvediloL (COREG) 25 mg tablet Take 1 Tab by mouth two (2) times daily (with meals).  furosemide (LASIX) 40 mg tablet Take 1 Tab by mouth two (2) times daily as needed (edema).  pantoprazole (PROTONIX) 40 mg tablet Take 1 Tab by mouth daily.  tizanidine HCl (TIZANIDINE PO) Take  by mouth.  albuterol (PROVENTIL HFA, VENTOLIN HFA, PROAIR HFA) 90 mcg/actuation inhaler Take 2 Puffs by inhalation every four (4) hours as needed for Wheezing.  aspirin 81 mg chewable tablet Take 2 Tabs by mouth daily.  insulin detemir (LEVEMIR) 100 unit/mL injection 15 units daily for diabetes    desloratadine (CLARINEX) 5 mg tablet Take 1 Tab by mouth daily as needed for Allergies.  mometasone (NASONEX) 50 mcg/actuation nasal spray 2 Sprays by Both Nostrils route daily as needed. For allergies    umeclidinium-vilanterol (ANORO ELLIPTA) 62.5-25 mcg/actuation inhaler Take 1 Puff by inhalation daily. For COPD    ferrous sulfate 325 mg (65 mg iron) tablet Take 1 Tab by mouth two (2) times a day. Indications: IRON DEFICIENCY ANEMIA     No current facility-administered medications for this visit.          PHYSICAL EXAMINATION:  Visit Vitals  Temp 97.5 °F (36.4 °C)   Ht 5' 4\" (1.626 m)   Wt 231 lb 12.8 oz (105.1 kg)   BMI 39.79 kg/m²        ORTHO EXAMINATION:  Examination Left knee Right knee   Skin Intact Intact Range of motion 110-0 110-0   Effusion - -   Medial joint line tenderness + +   Lateral joint line tenderness - -   Popliteal tenderness - -   Osteophytes palpable + +   Mayurs - -   Patella crepitus + +   Anterior drawer - -   Lateral laxity - -   Medial laxity - -   Varus deformity - -   Valgus deformity - -   Pretibial edema - -   Calf tenderness - -       Chart reviewed for the following:   I, Terry Page MD, have reviewed the History, Physical and updated the Allergic reactions for [address] performed immediately prior to start of procedure:  Yoselyn Hood MD, have performed the following reviews on Marianna Wen prior to the start of the procedure:            * Patient was identified by name and date of birth   * Agreement on procedure being performed was verified  * Risks and Benefits explained to the patient  * Procedure site verified and marked as necessary  * Patient was positioned for comfort  * Consent was obtained     Time: 2:13 PM    Date of procedure: 5/28/2020    Procedure performed by:  Terry Page MD    Ms. Yin tolerated the procedure well with no complications. RADIOGRAPHS:  XR BILAT KNEE 1/16/20 ZAK  IMPRESSION:  Three views - No fractures, no effusion, moderate medial joint space narrowing, + osteophytes present. Kellgren Dariel grade 2     IMPRESSION:      ICD-10-CM ICD-9-CM    1. Localized osteoarthritis of left knee M17.12 715.36 PROCEDURE AUTHORIZATION TO       betamethasone (Celestone Soluspan) 6 mg/mL injection      BETAMETHASONE ACETATE & SODIUM PHOSPHATE INJECTION 3 MG EA.      DRAIN/INJECT LARGE JOINT/BURSA   2. Localized osteoarthritis of right knee M17.11 715.36 PROCEDURE AUTHORIZATION TO       betamethasone (Celestone Soluspan) 6 mg/mL injection      BETAMETHASONE ACETATE & SODIUM PHOSPHATE INJECTION 3 MG EA.      DRAIN/INJECT LARGE JOINT/BURSA   3.  Chronic pain of left knee M25.562 719.46 PROCEDURE AUTHORIZATION TO     G89.29 338.29 betamethasone (Celestone Soluspan) 6 mg/mL injection      BETAMETHASONE ACETATE & SODIUM PHOSPHATE INJECTION 3 MG EA.      DRAIN/INJECT LARGE JOINT/BURSA   4. Chronic pain of right knee M25.561 719.46 PROCEDURE AUTHORIZATION TO     G89.29 338.29 betamethasone (Celestone Soluspan) 6 mg/mL injection      BETAMETHASONE ACETATE & SODIUM PHOSPHATE INJECTION 3 MG EA.      DRAIN/INJECT LARGE JOINT/BURSA     PLAN: After discussing treatment options, patient's knees were injected with 4 cc Marcaine and 1/2 cc Celestone. Consider visco supplementation if pain continues. There is no need for surgery at this time. She will follow up as needed.      Scribed by Mere Casas  (Yehuda Perez) as dictated by Brandon Olivera MD

## 2020-06-09 NOTE — PROGRESS NOTES
Hematology/Oncology Consultation Note      Date: 2020    Name: Velasquez Vaughn  : 1950        Macarena Singer MD         Subjective:     Chief complaint: Anemia    History of Present Illness:   Ms. Juana Hayes is a most pleasant 81y.o. year old female who was seen for consultation of anemia. The patient has a past medical history significant of myocardial infarction and CHF, chronic kidney disease stage III, type 2 diabetes, iron deficiency anemia, both knee arthritis. She has been taking iron pills for her iron deficiency. She noted having some constipation with iron pills. Lab reviews indicated chronic anemia since at least 2011. The patient reported chronic fatigue, low energy level, and generalized weakness for months. She also has episodes of shortness of breath on exertion. She denied any obvious bleeding. The patient otherwise has no other complaints. Denied fever, chills, night sweat, unintentional weight loss, skin lumps or bumps, acute bleeding or bruising issues. No acute bleeding, blood in stool, dark stool, melena, hematochezia, hemoptysis, dark urine, or easily bruising. Denied headache, acute vision change, dizziness, chest pain, palpitation, productive cough, nausea, vomiting, abdominal pain, altered bowel habits, dysuria, new bone pain or back pain, focal numbness or weakness. Independent with ADLs and IADLs. Oncologic History:   No history exists.        Past Medical History, Family History, and Social History:    Past Medical History:   Diagnosis Date    Abnormal WBC count 2016    Anemia     Bilateral shoulder pain 2016    Chondromalacia of both patellae     COPD (chronic obstructive pulmonary disease) ([address] 2015    mild-mod dz; Dr Yaneli Luong    Diabetes [facility])     Diabetic eye exam ([address] 2016    Dilated cardiomyopathy ([address]     Dyslipidemia     Gout     Heart attack ([address]     Heart attack ([address]     Heart attack ([address] 10/18/2019    History of echocardiogram 2014    Mild LVE. EF 40%. Mild, diffuse hypk. Mild LAE.   Mild MR.      Hypercholesteremia 14    Hypertension 2000    Noncompliance with medications 2016    Obesity     Orthostatic hypotension 2016    Osteoarthritis of both knees     Pain management 2016    Dr. Jair Fletcher Popliteal cyst, bilateral      Vitamin D deficiency 10/16/14     Past Surgical History:   Procedure Laterality Date    HX HEART CATHETERIZATION      HX TUBAL LIGATION       Social History     Socioeconomic History    Marital status: LEGALLY      Spouse name: Not on file    Number of children: 3    Years of education: Not on file    Highest education level: Not on file   Occupational History    Occupation: retired   Social Needs    Financial resource strain: Not on file    Food insecurity     Worry: Not on file     Inability: Not on file   [employer] needs     Medical: Not on file     Non-medical: Not on file   Tobacco Use    Smoking status: Former Smoker     Packs/day: 0.25     Years: 48.00     Pack years: 12.00     Types: Cigarettes     Last attempt to quit: 2018     Years since quittin.0    Smokeless tobacco: Never Used   Substance and Sexual Activity    Alcohol use: No     Alcohol/week: 0.0 standard drinks    Drug use: No    Sexual activity: Yes     Partners: Male   Lifestyle    Physical activity     Days per week: Not on file     Minutes per session: Not on file    Stress: Not on file   Relationships    Social connections     Talks on phone: Not on file     Gets together: Not on file     Attends Roman Catholic service: Not on file     Active member of club or organization: Not on file     Attends meetings of clubs or organizations: Not on file     Relationship status: Not on file    Intimate partner violence     Fear of current or ex partner: Not on file     Emotionally abused: Not on file     Physically abused: Not on file     Forced sexual activity: Not on file   Other Topics Concern     Service No    Blood Transfusions No    Caffeine Concern No    Occupational Exposure No    Hobby Hazards No    Sleep Concern No    Stress Concern No    Weight Concern No    Special Diet No    Back Care No    Exercise No    Bike Helmet No    Seat Belt Yes    Self-Exams Yes   Social History Narrative    Not on file     Family History   Problem Relation Age of Onset    Diabetes Mother     Hypertension Mother     Hypertension Father     Kidney Disease Maternal Aunt 50        Dialysis     Current Outpatient Medications   Medication Sig Dispense Refill    ENTRESTO  mg tablet TAKE 1 TABLET BY MOUTH 2 TIMES A DAY 60 Tab 6    atorvastatin (LIPITOR) 80 mg tablet Take 1 Tab by mouth daily. 90 Tab 3    carvediloL (COREG) 25 mg tablet Take 1 Tab by mouth two (2) times daily (with meals). 180 Tab 3    furosemide (LASIX) 40 mg tablet Take 1 Tab by mouth two (2) times daily as needed (edema). 180 Tab 1    pantoprazole (PROTONIX) 40 mg tablet Take 1 Tab by mouth daily. 30 Tab 0    tizanidine HCl (TIZANIDINE PO) Take  by mouth.  albuterol (PROVENTIL HFA, VENTOLIN HFA, PROAIR HFA) 90 mcg/actuation inhaler Take 2 Puffs by inhalation every four (4) hours as needed for Wheezing. 1 Inhaler 0    aspirin 81 mg chewable tablet Take 2 Tabs by mouth daily. 60 Tab 0    insulin detemir (LEVEMIR) 100 unit/mL injection 15 units daily for diabetes 2 Vial 0    desloratadine (CLARINEX) 5 mg tablet Take 1 Tab by mouth daily as needed for Allergies. 30 Tab 0    mometasone (NASONEX) 50 mcg/actuation nasal spray 2 Sprays by Both Nostrils route daily as needed. For allergies 3 Container 3    umeclidinium-vilanterol (ANORO ELLIPTA) 62.5-25 mcg/actuation inhaler Take 1 Puff by inhalation daily. For COPD 3 Inhaler 3    ferrous sulfate 325 mg (65 mg iron) tablet Take 1 Tab by mouth two (2) times a day.  Indications: IRON DEFICIENCY ANEMIA 60 Tab 11       Review of Systems Constitutional: Positive for malaise/fatigue. Negative for chills, diaphoresis, fever and weight loss. Respiratory: Positive for shortness of breath. Negative for cough, hemoptysis and wheezing. Cardiovascular: Negative for chest pain, palpitations and leg swelling. Gastrointestinal: Negative for abdominal pain, diarrhea, heartburn, nausea and vomiting. Genitourinary: Negative for dysuria, frequency, hematuria and urgency. Musculoskeletal: Negative for joint pain and myalgias. Skin: Negative for itching and rash. Neurological: Negative for dizziness, seizures, weakness and headaches. Psychiatric/Behavioral: Negative for depression. The patient does not have insomnia. Objective: There were no vitals taken for this visit. ECOG Performance Status (grade): 0  0 - able to carry on all pre-disease activity w/out restriction  1 - restricted but able to carry out light work  2 - ambulatory and can self- care but unable to carry out work  3 - bed or chair >50% of waking hours  4 - completely disable, total care, confined to bed or chair    Physical Exam  Constitutional:       Appearance: Normal appearance. Comments: Looks pale   HENT:      Head: Normocephalic and atraumatic. Eyes:      Pupils: Pupils are equal, round, and reactive to light. Neck:      Musculoskeletal: Neck supple. Cardiovascular:      Rate and Rhythm: Normal rate and regular rhythm. Heart sounds: Normal heart sounds. Pulmonary:      Effort: Pulmonary effort is normal.      Breath sounds: Normal breath sounds. Abdominal:      General: Bowel sounds are normal.      Palpations: Abdomen is soft. Tenderness: There is no abdominal tenderness. There is no guarding. Musculoskeletal: Normal range of motion. Right lower leg: No edema. Left lower leg: No edema. Skin:     General: Skin is warm. Neurological:      General: No focal deficit present.       Mental Status: She is alert and oriented to person, place, and time. Mental status is at baseline. Diagnostics:      No results found for this or any previous visit (from the past 96 hour(s)). Imaging:  No results found for this or any previous visit. Results for orders placed during the hospital encounter of 10/18/19   XR CHEST PA LAT    Narrative EXAMINATION: Chest 2 views    INDICATION: Chest pain    COMPARISON: 10/21/2019    FINDINGS: Frontal and lateral views of the chest obtained. Mediastinal  silhouette normal in size. Aortic arch calcifications. Slightly indistinct  perihilar interstitium. Basilar streaky densities. No evidence of pneumothorax. No obvious acute osseous findings. Impression IMPRESSION:    Suspected mild interstitial infiltrate/edema. Minimal basilar streaky densities,  probably atelectasis, less likely infiltrate. Results for orders placed during the hospital encounter of 10/18/19   CTA CHEST W OR W WO CONT    Narrative EXAM: CTA CHEST W OR W WO CONT    INDICATIONS: SOB on oxygen    TECHNIQUE: Utilizing pulmonary embolus protocol, thin section axial images were  obtained from the thoracic inlet into the upper abdomen after the uneventful  administration of IV contrast. Coronal and sagittal maximum intensity projection  (MIP) post-processed images were generated to better define pulmonary artery  anatomy and enhance sensitivity for detection of pulmonary emboli. Contrast  used: 72 cc Isovue-370    CT scans at this facility are performed using dose optimization technique as  appropriate with performed exam, to include automated exposure control,  adjustment of mA and/or kV according to patient's size (including appropriate  matching for site-specific examinations), or use of iterative reconstruction  technique. COMPARISON: May 2019    FINDINGS:    Suboptimal quality opacification. Decreased resolution due to body habitus. Contrast timing is of good quality however.     Pulmonary arteries: No large/central PE. No suspicious peripheral focus. The  main pulmonary artery is dilated at 3.6 cm diameter. Mediastinum: No adenopathy. Normal caliber aorta with mild atherosclerosis. Normal heart size without pericardial effusion. Enteric tube courses into the  stomach. ET tube unremarkable. Lungs/pleura: Respiratory motion artifact limits evaluation. Small to moderate  amount of ill-defined consolidation within the lower lungs. Groundglass  inflammation within the remainder of the lungs with some septal thickening. Small bilateral pleural effusions. Chest soft tissues: Unremarkable. Abdomen: Unremarkable. Bones: No acute findings. Unremarkable for age. Impression IMPRESSION[de-identified]    1.  Negative for acute PE.  -Enlarged main pulmonary artery as can be seen with pulmonary arterial  hypertension; appearance as before    2. Ill-defined bilateral lung consolidation, likely pneumonia. There is an  element of fluid overload with lung edema as well as small pleural effusions. Assessment:                                        1. Chronic anemia    2. Iron deficiency anemia, unspecified iron deficiency anemia type    3. Low serum vitamin B12        Plan:                                        # Normocytic anemia  # history of iron deficiency anemia  -- Lab reviews indicated chronic anemia since at least 4/21/2011.  -- Clinically significant of generalized weakness and episodes of shortness of breath on exertion. --Today I have reviewed with the patient about recent work-ups.  5/21/2020 CBC showed H/H 11.5/37.5, platelet 923Q, WBC 4.9M. Iron studies showed iron saturation 19% with ferritin 17. Borderline low vitamin B12 at 215. The immunofixation pattern appears unremarkable. Evidence of monoclonal protein is not apparent.   -- Her chronic normocytic anemia could be related to iron deficiency anemia, and possible low B12.  --I have discussed with the patient and her  about IV iron infusion, given her persistent iron deficiency status on iron pills. I have also discussed potential side effect of IV iron such as fever, rash, severe reactions or anaphylaxis, patient and family were agreeable with the plan. Plan:  -- Injectafer IV x 2, 1 week apart. --We will plan to repeat her CBC, iron profile and ferritin, B12/folate in about 6 to 8 weeks after IV infusion for monitor response. --The patient will be referred to GI for further assessment of JAN. -- We will see the patient back in clinic in about 3 months. Always sooner if required. The patient can have lab done prior our next clinic visit. No orders of the defined types were placed in this encounter. All of patient's questions answered to their apparent satisfaction. They verbally show understanding and agreement with aforementioned plan. Micaela Cramer MD  6/13/2020          About 25 minutes were spent for this encounter with more than 50% of the time spent in face-to-face counseling, discussing on diagnosis and management plan going forward, and co-ordination of care. Parts of this document has been produced using Dragon dictation system. Unrecognized errors in transcription may be present. Please do not hesitate to reach out for any questions or clarifications.         CC: Sergio Mathews MD

## 2020-06-11 ENCOUNTER — OFFICE VISIT (OUTPATIENT)
Dept: ONCOLOGY | Age: 70
End: 2020-06-11

## 2020-06-11 VITALS
TEMPERATURE: 98.5 F | HEART RATE: 75 BPM | OXYGEN SATURATION: 96 % | DIASTOLIC BLOOD PRESSURE: 74 MMHG | BODY MASS INDEX: 38.93 KG/M2 | WEIGHT: 228 LBS | HEIGHT: 64 IN | RESPIRATION RATE: 18 BRPM | SYSTOLIC BLOOD PRESSURE: 141 MMHG

## 2020-06-11 DIAGNOSIS — D50.9 IRON DEFICIENCY ANEMIA, UNSPECIFIED IRON DEFICIENCY ANEMIA TYPE: ICD-10-CM

## 2020-06-11 DIAGNOSIS — E53.8 LOW SERUM VITAMIN B12: ICD-10-CM

## 2020-06-11 DIAGNOSIS — D64.9 CHRONIC ANEMIA: Primary | ICD-10-CM

## 2020-06-17 ENCOUNTER — TELEPHONE (OUTPATIENT)
Dept: ONCOLOGY | Age: 70
End: 2020-06-17

## 2020-06-17 RX ORDER — ONDANSETRON 2 MG/ML
8 INJECTION INTRAMUSCULAR; INTRAVENOUS AS NEEDED
Status: CANCELLED | OUTPATIENT
Start: 2020-06-29

## 2020-06-17 RX ORDER — HEPARIN 100 UNIT/ML
300-500 SYRINGE INTRAVENOUS AS NEEDED
Status: CANCELLED
Start: 2020-06-29

## 2020-06-17 RX ORDER — HYDROCORTISONE SODIUM SUCCINATE 100 MG/2ML
100 INJECTION, POWDER, FOR SOLUTION INTRAMUSCULAR; INTRAVENOUS AS NEEDED
Status: CANCELLED | OUTPATIENT
Start: 2020-06-29

## 2020-06-17 RX ORDER — SODIUM CHLORIDE 9 MG/ML
25 INJECTION, SOLUTION INTRAVENOUS CONTINUOUS
Status: CANCELLED | OUTPATIENT
Start: 2020-06-29

## 2020-06-17 RX ORDER — SODIUM CHLORIDE 0.9 % (FLUSH) 0.9 %
10 SYRINGE (ML) INJECTION AS NEEDED
Status: CANCELLED
Start: 2020-06-29

## 2020-06-17 RX ORDER — SODIUM CHLORIDE 9 MG/ML
10 INJECTION INTRAMUSCULAR; INTRAVENOUS; SUBCUTANEOUS AS NEEDED
Status: CANCELLED | OUTPATIENT
Start: 2020-06-29

## 2020-06-17 RX ORDER — DIPHENHYDRAMINE HYDROCHLORIDE 50 MG/ML
50 INJECTION, SOLUTION INTRAMUSCULAR; INTRAVENOUS AS NEEDED
Status: CANCELLED
Start: 2020-06-29

## 2020-06-17 RX ORDER — EPINEPHRINE 1 MG/ML
0.3 INJECTION, SOLUTION, CONCENTRATE INTRAVENOUS AS NEEDED
Status: CANCELLED | OUTPATIENT
Start: 2020-06-29

## 2020-06-17 RX ORDER — ACETAMINOPHEN 325 MG/1
650 TABLET ORAL AS NEEDED
Status: CANCELLED
Start: 2020-06-29

## 2020-06-17 RX ORDER — ALBUTEROL SULFATE 0.83 MG/ML
2.5 SOLUTION RESPIRATORY (INHALATION) AS NEEDED
Status: CANCELLED
Start: 2020-06-29

## 2020-06-17 RX ORDER — DIPHENHYDRAMINE HYDROCHLORIDE 50 MG/ML
25 INJECTION, SOLUTION INTRAMUSCULAR; INTRAVENOUS AS NEEDED
Status: CANCELLED
Start: 2020-06-29

## 2020-06-17 NOTE — TELEPHONE ENCOUNTER
Ghazala Gas[facility] stating that pts insurance will not cover the injectafer. Can a new order for something else be put in.

## 2020-06-25 ENCOUNTER — OFFICE VISIT (OUTPATIENT)
Dept: ORTHOPEDIC SURGERY | Facility: CLINIC | Age: 70
End: 2020-06-25

## 2020-06-25 VITALS
HEIGHT: 64 IN | OXYGEN SATURATION: 97 % | TEMPERATURE: 97.1 F | DIASTOLIC BLOOD PRESSURE: 79 MMHG | SYSTOLIC BLOOD PRESSURE: 153 MMHG | RESPIRATION RATE: 16 BRPM | WEIGHT: 232.6 LBS | BODY MASS INDEX: 39.71 KG/M2 | HEART RATE: 73 BPM

## 2020-06-25 DIAGNOSIS — G89.29 CHRONIC PAIN OF LEFT KNEE: ICD-10-CM

## 2020-06-25 DIAGNOSIS — M25.562 CHRONIC PAIN OF LEFT KNEE: ICD-10-CM

## 2020-06-25 DIAGNOSIS — M17.11 PRIMARY OSTEOARTHRITIS OF RIGHT KNEE: ICD-10-CM

## 2020-06-25 DIAGNOSIS — G89.29 CHRONIC PAIN OF RIGHT KNEE: ICD-10-CM

## 2020-06-25 DIAGNOSIS — M17.12 PRIMARY OSTEOARTHRITIS OF LEFT KNEE: Primary | ICD-10-CM

## 2020-06-25 DIAGNOSIS — M25.561 CHRONIC PAIN OF RIGHT KNEE: ICD-10-CM

## 2020-06-25 RX ORDER — BETAMETHASONE SODIUM PHOSPHATE AND BETAMETHASONE ACETATE 3; 3 MG/ML; MG/ML
6 INJECTION, SUSPENSION INTRA-ARTICULAR; INTRALESIONAL; INTRAMUSCULAR; SOFT TISSUE ONCE
Qty: 0.5 ML | Refills: 0
Start: 2020-06-25 | End: 2020-06-25

## 2020-06-25 NOTE — PROGRESS NOTES
Patient: Scot Ray                MRN: [id number]       SSN: [id number]  YOB: 1950        AGE: 81 y.o. SEX: female      PCP: Missy Haynes MD  20    Chief Complaint   Patient presents with    Knee Pain     Wily knee pain     HISTORY:  Scot Ray is a 81 y.o. female who is seen for increased bilateral knee pain. She has been experiencing knee pain for the past several years. She does not recall any injury. She notes pain with standing, walking and stair climbing. She experiences startup pain after sitting. She states nothing relieves her pain. She responded well to Euflexxa in the past. She has tried a variety of conservative measures including cortisone injections, activity modification, bracing, NSAIDs and physical therapy. Pain Assessment  2020   Location of Pain Knee   Location Modifiers Right;Left   Severity of Pain 8   Quality of Pain Aching   Quality of Pain Comment -   Duration of Pain Persistent   Frequency of Pain Constant   Aggravating Factors -   Aggravating Factors Comment -   Limiting Behavior -   Relieving Factors Rest;Ice;Elevation; Heat   Result of Injury No   Work-Related Injury -   Type of Injury -   Type of Injury Comment -     Occupation, etc:Ms. Uvaldo Jaramillo receives Social Security Disability Benefits for COPD and her knees. She moved from [place] to [place] with her  when her mother  several years ago. She moved from [place] to [place] 10 years ago. She and her  live in [place]. She has 3 sons--1 in [place] and 2 in [place] and 13 grandchildren. She has 2 Chihuahua-Poodle mix dogs--Frisky and Boots. She gained weight recently. She is diabetic. She is 232 pounds and 5'4\" tall. Last 3 Recorded Weights in this Encounter    20 0755   Weight: 232 lb 9.6 oz (105.5 kg)     Body mass index is 39.93 kg/m².     REVIEW OF SYSTEMS: All Below are Negative except: See HPI     Constitutional: negative for fever, chills, and weight loss.   Cardiovascular: negative for chest pain, claudication, leg swelling, SOB, PIERSON   Gastrointestinal: Negative for pain, N/V/C/D, Blood in stool or urine, dysuria,  hematuria, incontinence, pelvic pain. Musculoskeletal: See HPI   Neurological: Negative for dizziness and weakness. Negative for headaches, Visual changes, confusion, seizures   Phychiatric/Behavioral: Negative for depression, memory loss, substance  abuse. Extremities: Negative for hair changes, rash, or skin lesion changes. Hematologic: Negative for bleeding problems, bruising, pallor or swollen lymph  nodes   Peripheral Vascular: No calf pain, no circulation deficits.     Social History     Socioeconomic History    Marital status: LEGALLY      Spouse name: Not on file    Number of children: 3    Years of education: Not on file    Highest education level: Not on file   Occupational History    Occupation: retired   Social Needs    Financial resource strain: Not on file    Food insecurity     Worry: Not on file     Inability: Not on file   [employer] needs     Medical: Not on file     Non-medical: Not on file   Tobacco Use    Smoking status: Former Smoker     Packs/day: 0.25     Years: 48.00     Pack years: 12.00     Types: Cigarettes     Last attempt to quit: 2018     Years since quittin.1    Smokeless tobacco: Never Used   Substance and Sexual Activity    Alcohol use: No     Alcohol/week: 0.0 standard drinks    Drug use: No    Sexual activity: Yes     Partners: Male   Lifestyle    Physical activity     Days per week: Not on file     Minutes per session: Not on file    Stress: Not on file   Relationships    Social connections     Talks on phone: Not on file     Gets together: Not on file     Attends Gnosticist service: Not on file     Active member of club or organization: Not on file     Attends meetings of clubs or organizations: Not on file     Relationship status: Not on file    Intimate partner violence     Fear of current or ex partner: Not on file     Emotionally abused: Not on file     Physically abused: Not on file     Forced sexual activity: Not on file   Other Topics Concern     Service No    Blood Transfusions No    Caffeine Concern No    Occupational Exposure No    Hobby Hazards No    Sleep Concern No    Stress Concern No    Weight Concern No    Special Diet No    Back Care No    Exercise No    Bike Helmet No    Seat Belt Yes    Self-Exams Yes   Social History Narrative    Not on file        No Known Allergies     Current Outpatient Medications   Medication Sig    ENTRESTO  mg tablet TAKE 1 TABLET BY MOUTH 2 TIMES A DAY    atorvastatin (LIPITOR) 80 mg tablet Take 1 Tab by mouth daily.  carvediloL (COREG) 25 mg tablet Take 1 Tab by mouth two (2) times daily (with meals).  furosemide (LASIX) 40 mg tablet Take 1 Tab by mouth two (2) times daily as needed (edema).  pantoprazole (PROTONIX) 40 mg tablet Take 1 Tab by mouth daily.  tizanidine HCl (TIZANIDINE PO) Take  by mouth.  albuterol (PROVENTIL HFA, VENTOLIN HFA, PROAIR HFA) 90 mcg/actuation inhaler Take 2 Puffs by inhalation every four (4) hours as needed for Wheezing.  aspirin 81 mg chewable tablet Take 2 Tabs by mouth daily.  insulin detemir (LEVEMIR) 100 unit/mL injection 15 units daily for diabetes    desloratadine (CLARINEX) 5 mg tablet Take 1 Tab by mouth daily as needed for Allergies.  mometasone (NASONEX) 50 mcg/actuation nasal spray 2 Sprays by Both Nostrils route daily as needed. For allergies    umeclidinium-vilanterol (ANORO ELLIPTA) 62.5-25 mcg/actuation inhaler Take 1 Puff by inhalation daily. For COPD    ferrous sulfate 325 mg (65 mg iron) tablet Take 1 Tab by mouth two (2) times a day. Indications: IRON DEFICIENCY ANEMIA     No current facility-administered medications for this visit.          PHYSICAL EXAMINATION:  Visit Vitals  /79   Pulse 73   Temp 97.1 °F (36.2 °C) (Oral)   Resp 16   Ht 5' 4\" (1.626 m)   Wt 232 lb 9.6 oz (105.5 kg)   SpO2 97%   BMI 39.93 kg/m²        ORTHO EXAMINATION:  Examination Left knee Right knee   Skin Intact Intact   Range of motion 110-0 110-0   Effusion - -   Medial joint line tenderness + +   Lateral joint line tenderness - -   Popliteal tenderness - -   Osteophytes palpable + +   Mayurs - -   Patella crepitus + +   Anterior drawer - -   Lateral laxity - -   Medial laxity - -   Varus deformity - -   Valgus deformity - -   Pretibial edema - -   Calf tenderness - -       Chart reviewed for the following:   I, Mickel Apley, MD, have reviewed the History, Physical and updated the Allergic reactions for [address] performed immediately prior to start of procedure:  Kei Taylor MD, have performed the following reviews on Nir Hughs prior to the start of the procedure:            * Patient was identified by name and date of birth   * Agreement on procedure being performed was verified  * Risks and Benefits explained to the patient  * Procedure site verified and marked as necessary  * Patient was positioned for comfort  * Consent was obtained     Time: 8:56 AM  Date of procedure: 6/25/2020    Procedure performed by:  Mickel Apley, MD    Ms. Yin tolerated the procedure well with no complications. RADIOGRAPHS:  XR BILAT KNEE 1/16/20 ZAK  IMPRESSION:  Three views - No fractures, no effusion, moderate medial joint space narrowing, + osteophytes present. Kellgren Dariel grade 2     IMPRESSION:      ICD-10-CM ICD-9-CM    1. Primary osteoarthritis of left knee M17.12 715.16 PROCEDURE AUTHORIZATION TO       betamethasone (Celestone Soluspan) 6 mg/mL injection      BETAMETHASONE ACETATE & SODIUM PHOSPHATE INJECTION 3 MG EA.      DRAIN/INJECT LARGE JOINT/BURSA   2.  Chronic pain of left knee M25.562 719.46 PROCEDURE AUTHORIZATION TO     G89.29 338.29 betamethasone (Celestone Soluspan) 6 mg/mL injection BETAMETHASONE ACETATE & SODIUM PHOSPHATE INJECTION 3 MG EA.      DRAIN/INJECT LARGE JOINT/BURSA   3. Primary osteoarthritis of right knee M17.11 715.16 PROCEDURE AUTHORIZATION TO       betamethasone (Celestone Soluspan) 6 mg/mL injection      BETAMETHASONE ACETATE & SODIUM PHOSPHATE INJECTION 3 MG EA.      DRAIN/INJECT LARGE JOINT/BURSA   4. Chronic pain of right knee M25.561 719.46 PROCEDURE AUTHORIZATION TO     G89.29 338.29 betamethasone (Celestone Soluspan) 6 mg/mL injection      BETAMETHASONE ACETATE & SODIUM PHOSPHATE INJECTION 3 MG EA.      DRAIN/INJECT LARGE JOINT/BURSA     PLAN: After discussing treatment options, patient's knees were injected with 4 cc Marcaine and 1/2 cc Celestone. Consider visco supplementation if pain continues. There is no need for surgery at this time. She will follow up as needed.      Scribed by Cristina Mcgee  ([address]) as dictated by Lucia Moyer MD

## 2020-06-25 NOTE — PROGRESS NOTES
1. Have you been to the ER, urgent care clinic since your last visit? Hospitalized since your last visit? No    2. Have you seen or consulted any other health care providers outside of the [address] [place] [address] since your last visit? Include any pap smears or colon screening.  No

## 2020-06-29 ENCOUNTER — HOSPITAL ENCOUNTER (OUTPATIENT)
Dept: INFUSION THERAPY | Age: 70
Discharge: HOME | End: 2020-06-29
Payer: MEDICARE

## 2020-06-29 VITALS
DIASTOLIC BLOOD PRESSURE: 75 MMHG | SYSTOLIC BLOOD PRESSURE: 145 MMHG | OXYGEN SATURATION: 95 % | HEART RATE: 65 BPM | TEMPERATURE: 97.7 F

## 2020-06-29 DIAGNOSIS — D50.9 IRON DEFICIENCY ANEMIA, UNSPECIFIED IRON DEFICIENCY ANEMIA TYPE: Primary | ICD-10-CM

## 2020-06-29 PROCEDURE — 96366 THER/PROPH/DIAG IV INF ADDON: CPT

## 2020-06-29 PROCEDURE — 96365 THER/PROPH/DIAG IV INF INIT: CPT

## 2020-06-29 PROCEDURE — 74011250636 HC RX REV CODE- 250/636: Performed by: NURSE PRACTITIONER

## 2020-06-29 RX ORDER — DIPHENHYDRAMINE HYDROCHLORIDE 50 MG/ML
25 INJECTION, SOLUTION INTRAMUSCULAR; INTRAVENOUS AS NEEDED
Status: CANCELLED
Start: 2020-07-13

## 2020-06-29 RX ORDER — SODIUM CHLORIDE 9 MG/ML
10 INJECTION INTRAMUSCULAR; INTRAVENOUS; SUBCUTANEOUS AS NEEDED
Status: CANCELLED | OUTPATIENT
Start: 2020-07-13

## 2020-06-29 RX ORDER — SODIUM CHLORIDE 9 MG/ML
25 INJECTION, SOLUTION INTRAVENOUS CONTINUOUS
Status: DISCONTINUED | OUTPATIENT
Start: 2020-06-29 | End: 2020-06-30 | Stop reason: HOSPADM

## 2020-06-29 RX ORDER — SODIUM CHLORIDE 0.9 % (FLUSH) 0.9 %
10 SYRINGE (ML) INJECTION AS NEEDED
Status: CANCELLED
Start: 2020-07-13

## 2020-06-29 RX ORDER — ONDANSETRON 2 MG/ML
8 INJECTION INTRAMUSCULAR; INTRAVENOUS AS NEEDED
Status: CANCELLED | OUTPATIENT
Start: 2020-07-13

## 2020-06-29 RX ORDER — DIPHENHYDRAMINE HYDROCHLORIDE 50 MG/ML
50 INJECTION, SOLUTION INTRAMUSCULAR; INTRAVENOUS AS NEEDED
Status: CANCELLED
Start: 2020-07-13

## 2020-06-29 RX ORDER — HYDROCORTISONE SODIUM SUCCINATE 100 MG/2ML
100 INJECTION, POWDER, FOR SOLUTION INTRAMUSCULAR; INTRAVENOUS AS NEEDED
Status: CANCELLED | OUTPATIENT
Start: 2020-07-13

## 2020-06-29 RX ORDER — ACETAMINOPHEN 325 MG/1
650 TABLET ORAL AS NEEDED
Status: CANCELLED
Start: 2020-07-13

## 2020-06-29 RX ORDER — ALBUTEROL SULFATE 0.83 MG/ML
2.5 SOLUTION RESPIRATORY (INHALATION) AS NEEDED
Status: CANCELLED
Start: 2020-07-13

## 2020-06-29 RX ORDER — SODIUM CHLORIDE 0.9 % (FLUSH) 0.9 %
10 SYRINGE (ML) INJECTION AS NEEDED
Status: DISCONTINUED | OUTPATIENT
Start: 2020-06-29 | End: 2020-06-30 | Stop reason: HOSPADM

## 2020-06-29 RX ORDER — SODIUM CHLORIDE 9 MG/ML
25 INJECTION, SOLUTION INTRAVENOUS CONTINUOUS
Status: CANCELLED | OUTPATIENT
Start: 2020-07-13

## 2020-06-29 RX ORDER — HEPARIN 100 UNIT/ML
300-500 SYRINGE INTRAVENOUS AS NEEDED
Status: CANCELLED
Start: 2020-07-13

## 2020-06-29 RX ORDER — EPINEPHRINE 1 MG/ML
0.3 INJECTION, SOLUTION, CONCENTRATE INTRAVENOUS AS NEEDED
Status: CANCELLED | OUTPATIENT
Start: 2020-07-13

## 2020-06-29 RX ADMIN — IRON SUCROSE 300 MG: 20 INJECTION, SOLUTION INTRAVENOUS at 13:36

## 2020-06-29 RX ADMIN — Medication 10 ML: at 13:21

## 2020-06-29 RX ADMIN — SODIUM CHLORIDE 25 ML/HR: 9 INJECTION, SOLUTION INTRAVENOUS at 13:25

## 2020-07-13 ENCOUNTER — HOSPITAL ENCOUNTER (OUTPATIENT)
Dept: INFUSION THERAPY | Age: 70
Discharge: HOME | End: 2020-07-13
Payer: MEDICARE

## 2020-07-13 VITALS
OXYGEN SATURATION: 95 % | TEMPERATURE: 98.2 F | SYSTOLIC BLOOD PRESSURE: 144 MMHG | HEART RATE: 66 BPM | DIASTOLIC BLOOD PRESSURE: 83 MMHG

## 2020-07-13 DIAGNOSIS — D50.9 IRON DEFICIENCY ANEMIA, UNSPECIFIED IRON DEFICIENCY ANEMIA TYPE: Primary | ICD-10-CM

## 2020-07-13 PROCEDURE — 96366 THER/PROPH/DIAG IV INF ADDON: CPT

## 2020-07-13 PROCEDURE — 74011250636 HC RX REV CODE- 250/636: Performed by: NURSE PRACTITIONER

## 2020-07-13 PROCEDURE — 96365 THER/PROPH/DIAG IV INF INIT: CPT

## 2020-07-13 RX ORDER — ONDANSETRON 2 MG/ML
8 INJECTION INTRAMUSCULAR; INTRAVENOUS AS NEEDED
Status: CANCELLED | OUTPATIENT
Start: 2020-07-27

## 2020-07-13 RX ORDER — SODIUM CHLORIDE 0.9 % (FLUSH) 0.9 %
10 SYRINGE (ML) INJECTION AS NEEDED
Status: CANCELLED
Start: 2020-07-27

## 2020-07-13 RX ORDER — DIPHENHYDRAMINE HYDROCHLORIDE 50 MG/ML
25 INJECTION, SOLUTION INTRAMUSCULAR; INTRAVENOUS AS NEEDED
Status: CANCELLED
Start: 2020-07-27

## 2020-07-13 RX ORDER — HYDROCORTISONE SODIUM SUCCINATE 100 MG/2ML
100 INJECTION, POWDER, FOR SOLUTION INTRAMUSCULAR; INTRAVENOUS AS NEEDED
Status: CANCELLED | OUTPATIENT
Start: 2020-07-27

## 2020-07-13 RX ORDER — ACETAMINOPHEN 325 MG/1
650 TABLET ORAL AS NEEDED
Status: CANCELLED
Start: 2020-07-27

## 2020-07-13 RX ORDER — EPINEPHRINE 1 MG/ML
0.3 INJECTION, SOLUTION, CONCENTRATE INTRAVENOUS AS NEEDED
Status: CANCELLED | OUTPATIENT
Start: 2020-07-27

## 2020-07-13 RX ORDER — SODIUM CHLORIDE 9 MG/ML
25 INJECTION, SOLUTION INTRAVENOUS CONTINUOUS
Status: DISCONTINUED | OUTPATIENT
Start: 2020-07-13 | End: 2020-07-14 | Stop reason: HOSPADM

## 2020-07-13 RX ORDER — SODIUM CHLORIDE 0.9 % (FLUSH) 0.9 %
10 SYRINGE (ML) INJECTION AS NEEDED
Status: DISCONTINUED | OUTPATIENT
Start: 2020-07-13 | End: 2020-07-14 | Stop reason: HOSPADM

## 2020-07-13 RX ORDER — ALBUTEROL SULFATE 0.83 MG/ML
2.5 SOLUTION RESPIRATORY (INHALATION) AS NEEDED
Status: CANCELLED
Start: 2020-07-27

## 2020-07-13 RX ORDER — SODIUM CHLORIDE 9 MG/ML
25 INJECTION, SOLUTION INTRAVENOUS CONTINUOUS
Status: CANCELLED | OUTPATIENT
Start: 2020-07-27

## 2020-07-13 RX ORDER — HEPARIN 100 UNIT/ML
300-500 SYRINGE INTRAVENOUS AS NEEDED
Status: CANCELLED
Start: 2020-07-27

## 2020-07-13 RX ORDER — DIPHENHYDRAMINE HYDROCHLORIDE 50 MG/ML
50 INJECTION, SOLUTION INTRAMUSCULAR; INTRAVENOUS AS NEEDED
Status: CANCELLED
Start: 2020-07-27

## 2020-07-13 RX ORDER — SODIUM CHLORIDE 9 MG/ML
10 INJECTION INTRAMUSCULAR; INTRAVENOUS; SUBCUTANEOUS AS NEEDED
Status: CANCELLED | OUTPATIENT
Start: 2020-07-27

## 2020-07-13 RX ADMIN — Medication 10 ML: at 13:20

## 2020-07-13 RX ADMIN — SODIUM CHLORIDE 300 MG: 900 INJECTION, SOLUTION INTRAVENOUS at 13:18

## 2020-07-13 RX ADMIN — Medication 30 ML: at 15:15

## 2020-07-13 NOTE — PROGRESS NOTES
[facility] Progress Note    Date: 2020    Name: Jazmin Izquierdo    MRN: [id number]         : 1950    Venofer 2/3 Q 14 days    Ms. Omer Hernandez arrived to [facility] at [id number]. Ms. Omer Hernandez was assessed and education was provided. Care notes- Venofer provided and went over with patient plus scanned into [facility]. Ms. Leopold Speaker vitals were reviewed. Visit Vitals  /82 (BP 1 Location: Right arm, BP Patient Position: Sitting)   Pulse 70   Temp 98.2 °F (36.8 °C)   SpO2 95%       24g IV inserted in patient's rt hand x 3 attempts. Positive for blood return/ flushes without difficulty. Venofer administered as ordered over 90 mins. Ms. Omer Hernandez tolerated well without complaints. IV removed/ intact. Gauze/ coban to site. Ms. Omer Hernandez was discharged from [place] [address] in stable condition at 1520. She is to return on 20 at 13.00 for her next Venofer 3/3.     Dee Dee Mosqueda RN  2020

## 2020-07-21 ENCOUNTER — TELEPHONE (OUTPATIENT)
Dept: ORTHOPEDIC SURGERY | Facility: CLINIC | Age: 70
End: 2020-07-21

## 2020-07-21 DIAGNOSIS — M17.12 PRIMARY OSTEOARTHRITIS OF LEFT KNEE: Primary | ICD-10-CM

## 2020-07-21 DIAGNOSIS — M17.11 PRIMARY OSTEOARTHRITIS OF RIGHT KNEE: ICD-10-CM

## 2020-07-21 NOTE — TELEPHONE ENCOUNTER
patient has [facility] their preferred drug is synvisc, gelsyn or durolane, can patients order for bilat knee be changed to one of them?

## 2020-07-22 ENCOUNTER — DOCUMENTATION ONLY (OUTPATIENT)
Dept: ORTHOPEDIC SURGERY | Facility: CLINIC | Age: 70
End: 2020-07-22

## 2020-07-27 ENCOUNTER — HOSPITAL ENCOUNTER (OUTPATIENT)
Dept: INFUSION THERAPY | Age: 70
Discharge: HOME | End: 2020-07-27
Payer: MEDICARE

## 2020-07-27 VITALS
OXYGEN SATURATION: 97 % | SYSTOLIC BLOOD PRESSURE: 163 MMHG | HEART RATE: 73 BPM | TEMPERATURE: 98 F | RESPIRATION RATE: 18 BRPM | DIASTOLIC BLOOD PRESSURE: 71 MMHG

## 2020-07-27 DIAGNOSIS — D50.9 IRON DEFICIENCY ANEMIA, UNSPECIFIED IRON DEFICIENCY ANEMIA TYPE: Primary | ICD-10-CM

## 2020-07-27 PROCEDURE — 96366 THER/PROPH/DIAG IV INF ADDON: CPT

## 2020-07-27 PROCEDURE — 96365 THER/PROPH/DIAG IV INF INIT: CPT

## 2020-07-27 PROCEDURE — 74011250636 HC RX REV CODE- 250/636: Performed by: NURSE PRACTITIONER

## 2020-07-27 RX ORDER — ACETAMINOPHEN 325 MG/1
650 TABLET ORAL AS NEEDED
Status: CANCELLED
Start: 2020-08-10

## 2020-07-27 RX ORDER — ALBUTEROL SULFATE 0.83 MG/ML
2.5 SOLUTION RESPIRATORY (INHALATION) AS NEEDED
Status: CANCELLED
Start: 2020-08-10

## 2020-07-27 RX ORDER — SODIUM CHLORIDE 0.9 % (FLUSH) 0.9 %
10 SYRINGE (ML) INJECTION AS NEEDED
Status: DISCONTINUED | OUTPATIENT
Start: 2020-07-27 | End: 2020-07-28 | Stop reason: HOSPADM

## 2020-07-27 RX ORDER — HYDROCORTISONE SODIUM SUCCINATE 100 MG/2ML
100 INJECTION, POWDER, FOR SOLUTION INTRAMUSCULAR; INTRAVENOUS AS NEEDED
Status: CANCELLED | OUTPATIENT
Start: 2020-08-10

## 2020-07-27 RX ORDER — HEPARIN 100 UNIT/ML
300-500 SYRINGE INTRAVENOUS AS NEEDED
Status: CANCELLED
Start: 2020-08-10

## 2020-07-27 RX ORDER — ONDANSETRON 2 MG/ML
8 INJECTION INTRAMUSCULAR; INTRAVENOUS AS NEEDED
Status: CANCELLED | OUTPATIENT
Start: 2020-08-10

## 2020-07-27 RX ORDER — SODIUM CHLORIDE 9 MG/ML
25 INJECTION, SOLUTION INTRAVENOUS CONTINUOUS
Status: DISCONTINUED | OUTPATIENT
Start: 2020-07-27 | End: 2020-07-28 | Stop reason: HOSPADM

## 2020-07-27 RX ORDER — SODIUM CHLORIDE 9 MG/ML
10 INJECTION INTRAMUSCULAR; INTRAVENOUS; SUBCUTANEOUS AS NEEDED
Status: CANCELLED | OUTPATIENT
Start: 2020-08-10

## 2020-07-27 RX ORDER — SODIUM CHLORIDE 9 MG/ML
25 INJECTION, SOLUTION INTRAVENOUS CONTINUOUS
Status: CANCELLED | OUTPATIENT
Start: 2020-08-10

## 2020-07-27 RX ORDER — SODIUM CHLORIDE 0.9 % (FLUSH) 0.9 %
10 SYRINGE (ML) INJECTION AS NEEDED
Status: CANCELLED
Start: 2020-08-10

## 2020-07-27 RX ORDER — DIPHENHYDRAMINE HYDROCHLORIDE 50 MG/ML
50 INJECTION, SOLUTION INTRAMUSCULAR; INTRAVENOUS AS NEEDED
Status: CANCELLED
Start: 2020-08-10

## 2020-07-27 RX ORDER — EPINEPHRINE 1 MG/ML
0.3 INJECTION, SOLUTION, CONCENTRATE INTRAVENOUS AS NEEDED
Status: CANCELLED | OUTPATIENT
Start: 2020-08-10

## 2020-07-27 RX ORDER — DIPHENHYDRAMINE HYDROCHLORIDE 50 MG/ML
25 INJECTION, SOLUTION INTRAMUSCULAR; INTRAVENOUS AS NEEDED
Status: CANCELLED
Start: 2020-08-10

## 2020-07-27 RX ADMIN — Medication 10 ML: at 15:48

## 2020-07-27 RX ADMIN — SODIUM CHLORIDE 400 MG: 900 INJECTION, SOLUTION INTRAVENOUS at 13:15

## 2020-07-27 RX ADMIN — Medication 10 ML: at 13:14

## 2020-08-07 NOTE — PROGRESS NOTES
Patient: Gema Arriaza                MRN: [id number]       SSN: [id number]  YOB: 1950        AGE: 81 y.o. SEX: female  Body mass index is 38.96 kg/m². PCP: Luis Briggs MD  08/13/19    Chief Complaint: Right foot follow up    HISTORY OF PRESENT ILLNESS:   Christina Garcia returns today for her right foot. Overall, she is doing better. She is not in her boot today. She says her pain is improving, although she does report some ankle pain. Past Medical History:   Diagnosis Date    Abnormal WBC count 5/17/2016    Bilateral shoulder pain 9/27/2016    Chondromalacia of both patellae     COPD (chronic obstructive pulmonary disease) ([facility] [address] 9/2015    mild-mod dz; Dr Maximo Tirado    Diabetes [facility]) 2013    Diabetic eye exam ([facility] [address] 2016    Dilated cardiomyopathy ([facility] [address]     Dyslipidemia     Heart attack ([facility] [address]     Heart attack ([facility] [address]     History of echocardiogram 11/14/2014    Mild LVE. EF 40%. Mild, diffuse hypk. Mild LAE. Mild MR.      Hypercholesteremia 1/08/14    Hypertension 2000    Noncompliance with medications 2/16/2016    Obesity     Orthostatic hypotension 5/17/2016    Osteoarthritis of both knees     Pain management 04/01/2016    Dr. Johnny Patterson Popliteal cyst, bilateral      Vitamin D deficiency 10/16/14       Family History   Problem Relation Age of Onset    Diabetes Mother     Hypertension Mother     Hypertension Father     Kidney Disease Maternal Aunt 50        Dialysis       Current Outpatient Medications   Medication Sig Dispense Refill    sacubitril-valsartan (ENTRESTO) 97 mg/103 mg tablet Take 1 Tab by mouth two (2) times a day. 60 Tab 6    aspirin 81 mg chewable tablet Take 2 Tabs by mouth daily. 60 Tab 0    furosemide (LASIX) 40 mg tablet Take 0.5 Tabs by mouth daily. 1 Tab 0    raNITIdine (ZANTAC) 150 mg tablet Take 1 Tab by mouth two (2) times a day.  60 Tab 4    carvedilol (COREG) 6.25 mg tablet Take 1 Tab by mouth two (2) times daily (with meals). 60 Tab 6    atorvastatin (LIPITOR) 20 mg tablet Take 1 Tab by mouth nightly. 30 Tab 0    insulin detemir (LEVEMIR) 100 unit/mL injection 15 units daily for diabetes 2 Vial 0    desloratadine (CLARINEX) 5 mg tablet Take 1 Tab by mouth daily as needed for Allergies. 30 Tab 0    mometasone (NASONEX) 50 mcg/actuation nasal spray 2 Sprays by Both Nostrils route daily as needed. For allergies 3 Container 3    umeclidinium-vilanterol (ANORO ELLIPTA) 62.5-25 mcg/actuation inhaler Take 1 Puff by inhalation daily. For COPD 3 Inhaler 3    ferrous sulfate 325 mg (65 mg iron) tablet Take 1 Tab by mouth two (2) times a day.  Indications: IRON DEFICIENCY ANEMIA 60 Tab 11       No Known Allergies    Past Surgical History:   Procedure Laterality Date    HX HEART CATHETERIZATION      HX TUBAL LIGATION         Social History     Socioeconomic History    Marital status: LEGALLY      Spouse name: Not on file    Number of children: 3    Years of education: Not on file    Highest education level: Not on file   Occupational History    Occupation: retired   Social Needs    Financial resource strain: Not on file    Food insecurity:     Worry: Not on file     Inability: Not on file   [employer] needs:     Medical: Not on file     Non-medical: Not on file   Tobacco Use    Smoking status: Former Smoker     Packs/day: 0.25     Years: 48.00     Pack years: 12.00     Types: Cigarettes     Last attempt to quit: 2018     Years since quittin.2    Smokeless tobacco: Never Used   Substance and Sexual Activity    Alcohol use: No     Alcohol/week: 0.0 standard drinks    Drug use: No    Sexual activity: Yes     Partners: Male   Lifestyle    Physical activity:     Days per week: Not on file     Minutes per session: Not on file    Stress: Not on file   Relationships    Social connections:     Talks on phone: Not on file     Gets together: Not on file     Attends [employer] service: Not on file     Active member of club or organization: Not on file     Attends meetings of clubs or organizations: Not on file     Relationship status: Not on file    Intimate partner violence:     Fear of current or ex partner: Not on file     Emotionally abused: Not on file     Physically abused: Not on file     Forced sexual activity: Not on file   Other Topics Concern     Service No    Blood Transfusions No    Caffeine Concern No    Occupational Exposure No    Hobby Hazards No    Sleep Concern No    Stress Concern No    Weight Concern No    Special Diet No    Back Care No    Exercise No    Bike Helmet No    Seat Belt Yes    Self-Exams Yes   Social History Narrative    Not on file       REVIEW OF SYSTEMS:      No changes from previous review of systems unless noted. PHYSICAL EXAMINATION:  Visit Vitals  /87 (BP 1 Location: Left arm, BP Patient Position: Sitting)   Pulse 84   Temp 96.8 °F (36 °C) (Oral)   Resp 16   Ht 5' 4\" (1.626 m)   Wt 227 lb (103 kg)   SpO2 94%   BMI 38.96 kg/m²     Body mass index is 38.96 kg/m². GENERAL: Alert and oriented x3, in no acute distress. HEENT: Normocephalic, atraumatic. RESP: Non labored breathing. SKIN: No rashes or lesions noted. PHYSICAL EXAM:  Physical exam of the right foot and ankle with improving tenderness to palpation over the fifth metatarsal.  No obvious deformity. She is neurovascularly intact distally. ASSESSMENT AND PLAN:   eSra Lee seems to be healing her fifth metatarsal fracture well. I will plan to see her back in a month. She can wean out of the boot. In a month, we will get x-rays of her foot.               Electronically signed by: Kashif Chiu MD Samples Given: Antonino Back handout given Action 3: Continue Other Instructions: The patient was reminded multiple times of the importance of taking a 2 week break from applying topical steroids. She will return in 3 months to consider going on otezla. She is not interested in biologics or phototherapy at this time. Detail Level: Zone

## 2020-08-13 ENCOUNTER — OFFICE VISIT (OUTPATIENT)
Dept: CARDIOLOGY CLINIC | Age: 70
End: 2020-08-13

## 2020-08-13 VITALS
HEART RATE: 72 BPM | WEIGHT: 233.8 LBS | TEMPERATURE: 98 F | HEIGHT: 64 IN | BODY MASS INDEX: 39.91 KG/M2 | DIASTOLIC BLOOD PRESSURE: 57 MMHG | SYSTOLIC BLOOD PRESSURE: 114 MMHG | OXYGEN SATURATION: 95 %

## 2020-08-13 DIAGNOSIS — E66.01 SEVERE OBESITY (BMI 35.0-39.9) WITH COMORBIDITY (HCC): ICD-10-CM

## 2020-08-13 DIAGNOSIS — E78.5 DYSLIPIDEMIA, GOAL LDL BELOW 70: ICD-10-CM

## 2020-08-13 DIAGNOSIS — I25.5 ISCHEMIC CARDIOMYOPATHY: ICD-10-CM

## 2020-08-13 DIAGNOSIS — I25.10 CORONARY ARTERY DISEASE INVOLVING NATIVE CORONARY ARTERY OF NATIVE HEART WITHOUT ANGINA PECTORIS: Primary | ICD-10-CM

## 2020-08-13 DIAGNOSIS — I10 ESSENTIAL HYPERTENSION: ICD-10-CM

## 2020-08-13 DIAGNOSIS — I50.22 CHRONIC SYSTOLIC CONGESTIVE HEART FAILURE (HCC): ICD-10-CM

## 2020-08-13 DIAGNOSIS — I34.0 NONRHEUMATIC MITRAL VALVE REGURGITATION: ICD-10-CM

## 2020-08-13 RX ORDER — ATORVASTATIN CALCIUM 80 MG/1
80 TABLET, FILM COATED ORAL DAILY
Qty: 90 TAB | Refills: 3 | Status: SHIPPED | OUTPATIENT
Start: 2020-08-13 | End: 2021-01-25 | Stop reason: SDUPTHER

## 2020-08-13 RX ORDER — CARVEDILOL 25 MG/1
25 TABLET ORAL 2 TIMES DAILY WITH MEALS
Qty: 180 TAB | Refills: 3 | Status: SHIPPED | OUTPATIENT
Start: 2020-08-13 | End: 2020-10-09 | Stop reason: SDUPTHER

## 2020-08-13 RX ORDER — SACUBITRIL AND VALSARTAN 97; 103 MG/1; MG/1
1 TABLET, FILM COATED ORAL 2 TIMES DAILY
Qty: 180 TAB | Refills: 2 | Status: SHIPPED | OUTPATIENT
Start: 2020-08-13 | End: 2020-10-09 | Stop reason: SDUPTHER

## 2020-08-13 RX ORDER — FUROSEMIDE 40 MG/1
40 TABLET ORAL
Qty: 180 TAB | Refills: 1 | Status: SHIPPED | OUTPATIENT
Start: 2020-08-13 | End: 2021-01-25 | Stop reason: SDUPTHER

## 2020-08-13 NOTE — PROGRESS NOTES
HISTORY OF PRESENT ILLNESS  William Swartz is a 78 y.o. female. History of anterior wall MI complicated by cardiac arrest due to acute stent closure requiring repeat PCI to her LAD with 2 YADIRA 5/2018        Cardiomyopathy   The history is provided by the patient and medical records. This is a chronic problem. The current episode started more than 1 week ago. The problem occurs constantly. The problem has not changed since onset. Associated symptoms include shortness of breath. Pertinent negatives include no chest pain and no headaches. The symptoms are aggravated by exertion. The symptoms are relieved by rest.   Hypertension   The history is provided by the patient and medical records. This is a chronic problem. The current episode started more than 1 week ago. Associated symptoms include shortness of breath. Pertinent negatives include no chest pain and no headaches. CHF   The history is provided by the medical records. This is a chronic problem. Associated symptoms include shortness of breath. Pertinent negatives include no chest pain and no headaches. Cholesterol Problem   The history is provided by the medical records. This is a chronic problem. Associated symptoms include shortness of breath. Pertinent negatives include no chest pain and no headaches. Shortness of Breath   The history is provided by the patient. This is a chronic problem. The problem occurs intermittently. The current episode started more than 1 week ago. The problem has not changed since onset. Associated symptoms include leg swelling. Pertinent negatives include no fever, no headaches, no cough, no wheezing, no PND, no orthopnea, no chest pain, no vomiting, no rash and no claudication. The problem's precipitants include exercise (1 flight; 10/19 50 ft; 2/20 steps; 8/20 house chores; ). Leg Swelling   The history is provided by the patient. This is a recurrent problem. The current episode started more than 1 week ago.  The problem occurs daily. Associated symptoms include shortness of breath. Pertinent negatives include no chest pain and no headaches. The symptoms are aggravated by standing. The symptoms are relieved by sleep and medications. Review of Systems   Constitutional: Negative for chills, fever, malaise/fatigue and weight loss. HENT: Negative for nosebleeds. Eyes: Negative for discharge. Respiratory: Positive for shortness of breath. Negative for cough and wheezing. Cardiovascular: Positive for leg swelling. Negative for chest pain, palpitations, orthopnea, claudication and PND. Gastrointestinal: Negative for diarrhea, nausea and vomiting. Genitourinary: Negative for dysuria and hematuria. Musculoskeletal: Negative for falls and joint pain. Skin: Negative for rash. Neurological: Negative for dizziness, seizures, loss of consciousness and headaches. Endo/Heme/Allergies: Negative for polydipsia. Does not bruise/bleed easily. Psychiatric/Behavioral: Negative for depression and substance abuse. The patient does not have insomnia. No Known Allergies    Past Medical History:   Diagnosis Date    Abnormal WBC count 5/17/2016    Anemia     Bilateral shoulder pain 9/27/2016    Chondromalacia of both patellae     COPD (chronic obstructive pulmonary disease) ([address] 9/2015    mild-mod dz; Dr Kathryn Olivas    Diabetes [facility]) 2013    Diabetic eye exam ([facility] [address] 2016    Dilated cardiomyopathy ([address]     Dyslipidemia     Gout     Heart attack ([facility] [address]     Heart attack ([address]     Heart attack ([facility] [address] 10/18/2019    History of echocardiogram 11/14/2014    Mild LVE. EF 40%. Mild, diffuse hypk. Mild LAE.   Mild MR.      Hypercholesteremia 1/08/14    Hypertension 2000    Noncompliance with medications 2/16/2016    Obesity     Orthostatic hypotension 5/17/2016    Osteoarthritis of both knees     Pain management 04/01/2016    Dr. Aldo Blas     Popliteal cyst, bilateral      Vitamin D deficiency 10/16/14       Family History   Problem Relation Age of Onset    Diabetes Mother     Hypertension Mother     Hypertension Father     Kidney Disease Maternal Aunt 48        Dialysis       Social History     Tobacco Use    Smoking status: Former Smoker     Packs/day: 0.25     Years: 48.00     Pack years: 12.00     Types: Cigarettes     Last attempt to quit: 2018     Years since quittin.2    Smokeless tobacco: Never Used   Substance Use Topics    Alcohol use: No     Alcohol/week: 0.0 standard drinks    Drug use: No        Current Outpatient Medications   Medication Sig    ENTRESTO  mg tablet TAKE 1 TABLET BY MOUTH 2 TIMES A DAY    atorvastatin (LIPITOR) 80 mg tablet Take 1 Tab by mouth daily.  carvediloL (COREG) 25 mg tablet Take 1 Tab by mouth two (2) times daily (with meals).  furosemide (LASIX) 40 mg tablet Take 1 Tab by mouth two (2) times daily as needed (edema).  pantoprazole (PROTONIX) 40 mg tablet Take 1 Tab by mouth daily.  tizanidine HCl (TIZANIDINE PO) Take  by mouth.  albuterol (PROVENTIL HFA, VENTOLIN HFA, PROAIR HFA) 90 mcg/actuation inhaler Take 2 Puffs by inhalation every four (4) hours as needed for Wheezing.  aspirin 81 mg chewable tablet Take 2 Tabs by mouth daily.  insulin detemir (LEVEMIR) 100 unit/mL injection 15 units daily for diabetes    desloratadine (CLARINEX) 5 mg tablet Take 1 Tab by mouth daily as needed for Allergies.  mometasone (NASONEX) 50 mcg/actuation nasal spray 2 Sprays by Both Nostrils route daily as needed. For allergies    umeclidinium-vilanterol (ANORO ELLIPTA) 62.5-25 mcg/actuation inhaler Take 1 Puff by inhalation daily. For COPD     No current facility-administered medications for this visit.          Past Surgical History:   Procedure Laterality Date    HX HEART CATHETERIZATION      HX TUBAL LIGATION         Visit Vitals  /57 (BP 1 Location: Left arm, BP Patient Position: Sitting)   Pulse 72   Temp 98 °F (36.7 °C) (Temporal)   Ht 5' 4\" (1.626 m)   Wt 106.1 kg (233 lb 12.8 oz)   SpO2 95%   BMI 40.13 kg/m²       Diagnostic Studies:  I have reviewed the relevant tests done on the patient and show as follows  EKG tracings reviewed by me today. EKG Results     None        XR Results (most recent):  Results from Hospital Encounter encounter on 10/18/19   XR CHEST PA LAT    Narrative EXAMINATION: Chest 2 views    INDICATION: Chest pain    COMPARISON: 10/21/2019    FINDINGS: Frontal and lateral views of the chest obtained. Mediastinal  silhouette normal in size. Aortic arch calcifications. Slightly indistinct  perihilar interstitium. Basilar streaky densities. No evidence of pneumothorax. No obvious acute osseous findings. Impression IMPRESSION:    Suspected mild interstitial infiltrate/edema. Minimal basilar streaky densities,  probably atelectasis, less likely infiltrate. 08/07/19   ECHO ADULT COMPLETE 08/07/2019 8/7/2019    Narrative · Left Ventricle: Normal cavity size and wall thickness. Mild systolic   dysfunction. Estimated left ventricular ejection fraction is 46 - 50%. Abnormal left ventricular wall motion. Moderate (grade 2) left ventricular   diastolic dysfunction. · Left Atrium: Moderately dilated left atrium. · Aortic Valve: Aortic valve sclerosis. · Mitral Valve: Mitral valve thickening. Moderate to severe mitral valve   regurgitation. · Tricuspid Valve: Mild tricuspid valve regurgitation is present. Pulmonary arterial systolic pressure is 93.9 mmHg. Moderate pulmonary   hypertension. · Pulmonic Valve: Mild pulmonic valve regurgitation is present. · IVC/Hepatic Veins: Severely elevated central venous pressure (15+ mmHg);   IVC diameter is larger than 21 mm and collapses less than 50% with   respiration. Signed by: Sally Guerrero MD       08/25/19   NUCLEAR CARDIAC STRESS TEST 08/28/2019 8/28/2019    Narrative · Baseline ECG: Normal sinus rhythm, occasional PACs.   · Gated SPECT: Left ventricular function post-stress was abnormal.   Calculated ejection fraction is 44%. The TID ratio is 0.94. · Negative stress test.  · Left ventricular perfusion is abnormal.  · Myocardial perfusion imaging defect 1: There is a defect that is   moderate in size with a severe reduction in uptake present in the   mid-apical anterior and apex location(s) that is non-reversible. There is   abnormal wall motion in the defect area. Viability in the area is poor. The defect appears to be infarction. Perfusion defect was visually present   without quantitative evidence. · Positive myocardial perfusion imaging. Myocardial perfusion imaging   supports an intermediate risk stress test.        Signed by: Kat Smallwood MD        10/18/19   ECHO ADULT FOLLOW-UP OR LIMITED 10/21/2019 10/21/2019    Narrative · Left Ventricle: Mildly dilated left ventricle. Mild concentric   hypertrophy. Mild-to-moderate systolic dysfunction. Calculated left   ventricular ejection fraction is 46%. Biplane method used to measure   ejection fraction. · Mitral Valve: Mitral valve thickening. Mitral annular calcification. Moderate mitral valve regurgitation is present. The mitral regurgitant jet   is eccentric. · Interatrial Septum: Agitated saline contrast study was performed. No   obvious septal defect. Signed by: Vu Carlisle MD     10/18/19   CARDIAC PROCEDURE 10/25/2019 10/25/2019    Narrative Patent stent in proximal LAD. Mildly reduced LV function. Continue intense risk factor modification. Signed by: Vu Carlisle MD         Ms. Viki Vanegas has a reminder for a \"due or due soon\" health maintenance. I have asked that she contact her primary care provider for follow-up on this health maintenance. Physical Exam   Constitutional: She is oriented to person, place, and time. She appears well-developed and well-nourished. Neck: No JVD present.    Cardiovascular: Normal rate, regular rhythm, normal heart sounds and intact distal pulses. Exam reveals no gallop and no friction rub. No murmur heard. Pulmonary/Chest: Effort normal and breath sounds normal. No respiratory distress. She has no wheezes. She has no rales. She exhibits no tenderness. Abdominal: Soft. She exhibits no distension and no mass. There is no abdominal tenderness. Musculoskeletal: Normal range of motion. General: No edema. Neurological: She is alert and oriented to person, place, and time. Skin: Skin is warm and dry. Psychiatric: She has a normal mood and affect. ASSESSMENT and PLAN      HLD : Results for Vince Byrne (MRN [id number]) as of 10/11/2019 12:54   Ref. Range 9/10/2019 00:00 9/30/2019 17:00 10/10/2019 00:00   Triglyceride Latest Ref Range: 0 - 149 mg/dL 67  101   Cholesterol, total Latest Ref Range: 100 - 199 mg/dL 168  152   HDL Cholesterol Latest Ref Range: >39 mg/dL 55  46   LDL, calculated Latest Ref Range: 0 - 99 mg/dL 100 (H)  86   VLDL, calculated Latest Ref Range: 5 - 40 mg/dL 13  20     Mitral regurgitation: 10/19 moderate;    CHF is improving well. Advised patient to skip Lasix if there is no swelling. NYHA class II  Follow-up electrolytes periodically. Lipids to be checked again as they were not done in May when she had other labs. She will try to lose weight. Diet weight and exercise discussed and she is trying to follow. Diagnoses and all orders for this visit:    1. Coronary artery disease involving native coronary artery of native heart without angina pectoris    2. Chronic systolic congestive heart failure (HCC)  -     sacubitriL-valsartan (Entresto)  mg tablet; Take 1 Tab by mouth two (2) times a day. -     carvediloL (COREG) 25 mg tablet; Take 1 Tab by mouth two (2) times daily (with meals). -     furosemide (LASIX) 40 mg tablet; Take 1 Tab by mouth two (2) times daily as needed (edema). -     ECHO ADULT COMPLETE; Future    3. Essential hypertension  -     carvediloL (COREG) 25 mg tablet;  Take 1 Tab by mouth two (2) times daily (with meals). 4. Dyslipidemia, goal LDL below 70  -     atorvastatin (LIPITOR) 80 mg tablet; Take 1 Tab by mouth daily.  -     LIPID PANEL; Future    5. Severe obesity (BMI 35.0-39. 9) with comorbidity ([address]    6. Ischemic cardiomyopathy    7. Nonrheumatic mitral valve regurgitation  -     ECHO ADULT COMPLETE; Future        Pertinent laboratory and test data reviewed and discussed with patient.   See patient instructions also for other medical advice given    Medications Discontinued During This Encounter   Medication Reason    ferrous sulfate 325 mg (65 mg iron) tablet     ENTRESTO  mg tablet Reorder    carvediloL (COREG) 25 mg tablet Reorder    furosemide (LASIX) 40 mg tablet Reorder    atorvastatin (LIPITOR) 80 mg tablet Reorder       Follow-up and Dispositions    · Return in about 3 months (around 11/13/2020), or if symptoms worsen or fail to improve, for post test.

## 2020-08-13 NOTE — PROGRESS NOTES
Patient Education     Presurgery Checklist  You are scheduled to have surgery. The healthcare staff will try to make your stay comfortable. Use the guidelines below to remind yourself what to do before surgery. Be sure to follow any specific pre-op instructions from your surgeon or nurse.  Preparing for surgery  · If you are having abdominal surgery, ask what you need to do to clear your bowel.  · Tell your surgeon if you have allergies to any medicines, latex, or foods.  · Ask your surgeon if you might need a blood transfusion during surgery and if so, how to prepare for it. In some cases, you can donate blood before surgery. If needed, this blood can be given back (transfused) to you during or after surgery.  · Arrange for an adult family member or friend to drive you home after surgery. If possible, have someone ready to help you at home as you recover.  · Call the surgeon if you get a cold, fever, sore throat, diarrhea, or other health problem just before surgery. Your surgeon can decide whether or not to postpone the surgery.  Medicines  · Tell your surgeon about all medicines you take, including prescription and over-the-counter products such as herbal remedies and vitamins. Ask if you should continue taking them.  · If you take ibuprofen, naproxen, or blood thinners (anticoagulants) such as aspirin, clopidogrel, or warfarin, ask your surgeon whether you should stop taking them and how long before surgery you should stop.  · You may be told to take antibiotics just before surgery to prevent infection. If so, follow instructions carefully on how to take them.  · If you are told to take blood thinners to help prevent blood clots after surgery, be sure to follow the instructions on how to take them.  Stop smoking  If you smoke, healing may take longer. So at least 2 week(s) before surgery, stop smoking.  Bathing or showering before surgery  · If instructed, wash with antibacterial soap. Afterward, do not use  Patient didn't bring medications, verbally reviewed. 1. Have you been to the ER, urgent care clinic since your last visit? Hospitalized since your last visit? No    2. Have you seen or consulted any other health care providers outside of the [address] [place] [address] since your last visit? Include any pap smears or colon screening. Yes Where: PCP Reason for visit: Routine Visit     3. Since your last visit, have you had any of the following symptoms? shortness of breath and swelling in legs/arms. 4.  Have you had any blood work, X-rays or cardiac testing? Yes Where: [facility] Reason for visit: Labs     Requested: NO     In [facility]: YES    5. Where do you normally have your labs drawn? [facility]    6. Do you need any refills today?    Yes lotions, oils, or powders.  · If you are having surgery on the head, you may be asked to shampoo with antibacterial soap. Follow instructions for doing so.  Do not remove hair from the surgery site  Do not shave hair from the incision site, unless you are given specific instructions to do so. Usually, if hair needs to be removed, it will be done at the hospital right before surgery.  Don’t eat or drink  · Follow any directions you are given for not eating or drinking before surgery. If you don't follow instructions about when to stop eating and drinking, your procedure may be postponed or rescheduled for another day. This is a safety issue.  · You can brush your teeth and rinse your mouth, but don’t swallow any water.  Day of surgery  · Don't wear makeup. Don't use perfume, deodorant, or hairspray. Remove nail polish and artificial nails.  · Leave jewelry (including rings), watches, and other valuables at home.  · Be sure to bring health insurance cards or forms and a photo ID.  · Bring a list of your medicines (include the name, dose, how often you take them, and the time last dose was taken).  · Arrive on time at the hospital or surgery facility.  Date Last Reviewed: 12/1/2016 © 2000-2018 [employer]. [address] [place] [address], [place] [address]. All rights reserved. This information is not intended as a substitute for professional medical care. Always follow your healthcare professional's instructions.           MEDICATIONS TO AVOID PRIOR TO SURGERY    To decrease the risk of bleeding, please STOP all aspirin or aspirin containing containing products at least 2 weeks prior to surgery. Below is a list of some medications and/or supplements that you should avoid. TYLENOL (acetaminophen) is OK to take according to the package directions.     Medications:         Advil, Ibuprofen, Motrin     Midol caplets  Aleve, Naproxen, Naprosyn    Mobigesic Tablets  Alley-[place] Effervescent Tablets    Neocyclate  Tablets  Alley-[place] Plus Cold Medicine Tablets   Pabalate  Anacin Tablets      Pepto-Bismol Tablets  Anodynos Tablets       Or Liquid   Arthralgen Tablets  Arthritis Pain Formula Tablets   Arthropan Liquid  ASA Enseals  Ascripten Tablets  Ascripten AD Tablets  Ascripten Extra Strength Tablets  Aspergum Nitin Aspirin Tablets  Aspirin  Nitin Children's Aspirin Tablets  Nitin Children's Cold Tablets  Nitin Timed Release Aspirin Tablets  BC Tablet and Powder  Buffaprin Tablets  Bufferin Tablets  Bufferin, Arthritis Strength Tablets  Bufferin, Extra Strength Tablets  Buffets II Tablets  Buffinol Tablets or Extra Tablets  Buf-Tabs  Cama Arthritis Strength Tablets  Canton Tablets  Dasin Tablets  Agustin's Pills  Duradyne Tablets  Ecotrin Tablets  Emagrin Tablets  Empirin Tablets  Excedrin Tablets and Caplets  4-Way Cold Tablets  Maximum Nitin Aspirin  Measurin tablets     Supplements:  Echinacea  Ephedra  Feverfew  Fish Oil  Garlic  Leticia  Gingko  Glucosamine  Goldenseal  Licorice  Laureles's Wort  Valerian  Vitamin D  Vitamin E     --- Multivitamin is OK ---    Please note: this list is not all inclusive; please check with your primary care provider if you have any questions regarding medications prior to your surgery.     If you are taking any diet medication, either over-the-counter or prescribed, it must be stopped 2 weeks prior to surgery.

## 2020-08-14 ENCOUNTER — OFFICE VISIT (OUTPATIENT)
Dept: ORTHOPEDIC SURGERY | Facility: CLINIC | Age: 70
End: 2020-08-14

## 2020-08-14 VITALS
WEIGHT: 235.4 LBS | TEMPERATURE: 96.5 F | HEIGHT: 64 IN | HEART RATE: 71 BPM | BODY MASS INDEX: 40.19 KG/M2 | OXYGEN SATURATION: 97 %

## 2020-08-14 DIAGNOSIS — G89.29 CHRONIC PAIN OF LEFT KNEE: ICD-10-CM

## 2020-08-14 DIAGNOSIS — M77.12 LATERAL EPICONDYLITIS OF LEFT ELBOW: Primary | ICD-10-CM

## 2020-08-14 DIAGNOSIS — M25.561 CHRONIC PAIN OF RIGHT KNEE: ICD-10-CM

## 2020-08-14 DIAGNOSIS — G89.29 CHRONIC PAIN OF RIGHT KNEE: ICD-10-CM

## 2020-08-14 DIAGNOSIS — M25.562 CHRONIC PAIN OF LEFT KNEE: ICD-10-CM

## 2020-08-14 DIAGNOSIS — M25.522 LEFT ELBOW PAIN: ICD-10-CM

## 2020-08-14 DIAGNOSIS — M17.11 PRIMARY OSTEOARTHRITIS OF RIGHT KNEE: ICD-10-CM

## 2020-08-14 DIAGNOSIS — M17.12 PRIMARY OSTEOARTHRITIS OF LEFT KNEE: ICD-10-CM

## 2020-08-14 RX ORDER — HYLAN G-F 20 16MG/2ML
16 SYRINGE (ML) INTRAARTICULAR
Qty: 1 SYRINGE | Refills: 0
Start: 2020-08-14 | End: 2020-08-14

## 2020-08-14 RX ORDER — BETAMETHASONE SODIUM PHOSPHATE AND BETAMETHASONE ACETATE 3; 3 MG/ML; MG/ML
6 INJECTION, SUSPENSION INTRA-ARTICULAR; INTRALESIONAL; INTRAMUSCULAR; SOFT TISSUE ONCE
Qty: 0.5 ML | Refills: 0
Start: 2020-08-14 | End: 2020-08-14

## 2020-08-14 NOTE — PROGRESS NOTES
Patient: Brown Lira                MRN: [id number]       SSN: [id number]  YOB: 1950        AGE: 78 y.o. SEX: female      PCP: Justin Patrick MD  20    CC: LEFT ELBOW PAIN    HISTORY:  Brown Lira is a 78 y.o. female who is seen for left elbow pain and  increased bilateral knee pain. She has been experiencing left elbow pain for the past 3 months. She has pain when lifting and gripping objects. She notes pain when watering her flowers with a watering can. She does not recall any injury. She is also seen for bilateral knee pain. She has been experiencing knee pain for the past several years. She does not recall any injury. She notes pain with standing, walking and stair climbing. She experiences startup pain after sitting. She states nothing relieves her pain. She responded well to Euflexxa in the past. She has tried a variety of conservative measures including cortisone injections, activity modification, bracing, NSAIDs and physical therapy. Pain Assessment  2020   Location of Pain Knee   Location Modifiers Left;Right   Severity of Pain 10   Quality of Pain Sharp   Quality of Pain Comment -   Duration of Pain Persistent   Frequency of Pain Constant   Aggravating Factors Stairs; Walking;Standing   Aggravating Factors Comment -   Limiting Behavior No   Relieving Factors Nothing   Result of Injury No   Work-Related Injury -   Type of Injury -   Type of Injury Comment -     Occupation, etc:Ms. Lida Ha receives Social Security Disability Benefits for COPD and her knees. She moved from [place] to [facility] with her  when her mother  several years ago. She moved from [facility] to [place] 10 years ago. She and her  live in [place]. She has 3 sons--1 in [place] and 2 in [facility] and 13 grandchildren. She has 2 Chihuahua-Poodle mix dogs--Frisky and Boots. Charlott Breach and Boots are 3 yo sisters. She gained weight recently. She is diabetic.  She is 235 pounds and 5'4\" tall.    Last 3 Recorded Weights in this Encounter    20 1347   Weight: 235 lb 6.4 oz (106.8 kg)     Body mass index is 40.41 kg/m². REVIEW OF SYSTEMS: All Below are Negative except: See HPI     Constitutional: negative for fever, chills, and weight loss. Cardiovascular: negative for chest pain, claudication, leg swelling, SOB, PIERSON   Gastrointestinal: Negative for pain, N/V/C/D, Blood in stool or urine, dysuria,  hematuria, incontinence, pelvic pain. Musculoskeletal: See HPI   Neurological: Negative for dizziness and weakness. Negative for headaches, Visual changes, confusion, seizures   Phychiatric/Behavioral: Negative for depression, memory loss, substance  abuse. Extremities: Negative for hair changes, rash, or skin lesion changes. Hematologic: Negative for bleeding problems, bruising, pallor or swollen lymph  nodes   Peripheral Vascular: No calf pain, no circulation deficits.     Social History     Socioeconomic History    Marital status: LEGALLY      Spouse name: Not on file    Number of children: 3    Years of education: Not on file    Highest education level: Not on file   Occupational History    Occupation: retired   Social Needs    Financial resource strain: Not on file    Food insecurity     Worry: Not on file     Inability: Not on file   [employer] needs     Medical: Not on file     Non-medical: Not on file   Tobacco Use    Smoking status: Former Smoker     Packs/day: 0.25     Years: 48.00     Pack years: 12.00     Types: Cigarettes     Last attempt to quit: 2018     Years since quittin.2    Smokeless tobacco: Never Used   Substance and Sexual Activity    Alcohol use: No     Alcohol/week: 0.0 standard drinks    Drug use: No    Sexual activity: Yes     Partners: Male   Lifestyle    Physical activity     Days per week: Not on file     Minutes per session: Not on file    Stress: Not on file   Relationships    Social connections     Talks on phone: Not on file     Gets together: Not on file     Attends Restoration service: Not on file     Active member of club or organization: Not on file     Attends meetings of clubs or organizations: Not on file     Relationship status: Not on file    Intimate partner violence     Fear of current or ex partner: Not on file     Emotionally abused: Not on file     Physically abused: Not on file     Forced sexual activity: Not on file   Other Topics Concern     Service No    Blood Transfusions No    Caffeine Concern No    Occupational Exposure No    Hobby Hazards No    Sleep Concern No    Stress Concern No    Weight Concern No    Special Diet No    Back Care No    Exercise No    Bike Helmet No    Seat Belt Yes    Self-Exams Yes   Social History Narrative    Not on file        No Known Allergies     Current Outpatient Medications   Medication Sig    sacubitriL-valsartan (Entresto)  mg tablet Take 1 Tab by mouth two (2) times a day.  carvediloL (COREG) 25 mg tablet Take 1 Tab by mouth two (2) times daily (with meals).  furosemide (LASIX) 40 mg tablet Take 1 Tab by mouth two (2) times daily as needed (edema).  atorvastatin (LIPITOR) 80 mg tablet Take 1 Tab by mouth daily.  pantoprazole (PROTONIX) 40 mg tablet Take 1 Tab by mouth daily.  tizanidine HCl (TIZANIDINE PO) Take  by mouth.  albuterol (PROVENTIL HFA, VENTOLIN HFA, PROAIR HFA) 90 mcg/actuation inhaler Take 2 Puffs by inhalation every four (4) hours as needed for Wheezing.  aspirin 81 mg chewable tablet Take 2 Tabs by mouth daily.  insulin detemir (LEVEMIR) 100 unit/mL injection 15 units daily for diabetes    desloratadine (CLARINEX) 5 mg tablet Take 1 Tab by mouth daily as needed for Allergies.  mometasone (NASONEX) 50 mcg/actuation nasal spray 2 Sprays by Both Nostrils route daily as needed. For allergies    umeclidinium-vilanterol (ANORO ELLIPTA) 62.5-25 mcg/actuation inhaler Take 1 Puff by inhalation daily.  For COPD No current facility-administered medications for this visit. PHYSICAL EXAMINATION:  Visit Vitals  Pulse 71   Temp (!) 96.5 °F (35.8 °C) (Temporal)   Ht 5' 4\" (1.626 m)   Wt 235 lb 6.4 oz (106.8 kg)   SpO2 97%   BMI 40.41 kg/m²        ORTHO EXAMINATION:  Examination Left knee Right knee   Skin Intact Intact   Range of motion 110-0 110-0   Effusion - -   Medial joint line tenderness + +   Lateral joint line tenderness - -   Popliteal tenderness - -   Osteophytes palpable + +   Mayurs - -   Patella crepitus + +   Anterior drawer - -   Lateral laxity - -   Medial laxity - -   Varus deformity - -   Valgus deformity - -   Pretibial edema - -   Calf tenderness - -     Examination Right Elbow Left Elbow   Skin Intact Intact   Range of Motion 135-0 135-0   Tenderness - ++   Swelling - -   Bruising - -   Stability Normal Normal   Motor Strength  Normal Normal   Neurovascular Intact Intact        Chart reviewed for the following:   I, Wilian Peña MD, have reviewed the History, Physical and updated the Allergic reactions for [id number] performed immediately prior to start of procedure:  Beverly Gonzales MD, have performed the following reviews on John Car prior to the start of the procedure:            * Patient was identified by name and date of birth   * Agreement on procedure being performed was verified  * Risks and Benefits explained to the patient  * Procedure site verified and marked as necessary  * Patient was positioned for comfort  * Consent was obtained     Time: 2:42 PM  Date of procedure: 8/14/2020    Procedure performed by:  Wilian Peña MD    Ms. Yin tolerated the procedure well with no complications. RADIOGRAPHS:  XR BILAT KNEE 1/16/20 ZAK  IMPRESSION:  Three views - No fractures, no effusion, moderate medial joint space narrowing, + osteophytes present. Kellgren Dariel grade 2     IMPRESSION:      ICD-10-CM ICD-9-CM    1.  Lateral epicondylitis of left elbow  M77.12 726.32 betamethasone (Celestone Soluspan) 6 mg/mL injection      BETAMETHASONE ACETATE & SODIUM PHOSPHATE INJECTION 3 MG EA.   2. Primary osteoarthritis of left knee  M17.12 715.16 hylan g-f 20 (Synvisc) 16 mg/2 mL injection      [place] SYNVISC OR SYNVISC-ONE      [place] DRAIN/INJECT LARGE JOINT/BURSA   3. Chronic pain of left knee  M25.562 719.46 hylan g-f 20 (Synvisc) 16 mg/2 mL injection    G89.29 338.29 [place] SYNVISC OR SYNVISC-ONE      W[place] DRAIN/INJECT LARGE JOINT/BURSA   4. Primary osteoarthritis of right knee  M17.11 715.16 hylan g-f 20 (Synvisc) 16 mg/2 mL injection      [place] SYNVISC OR SYNVISC-ONE      [place] DRAIN/INJECT LARGE JOINT/BURSA   5. Chronic pain of right knee  M25.561 719.46 hylan g-f 20 (Synvisc) 16 mg/2 mL injection    G89.29 338.29 [place] SYNVISC OR SYNVISC-ONE      [place] DRAIN/INJECT LARGE JOINT/BURSA   6. Left elbow pain  M25.522 719.42 betamethasone (Celestone Soluspan) 6 mg/mL injection      BETAMETHASONE ACETATE & SODIUM PHOSPHATE INJECTION 3 MG EA. PLAN: After discussing treatment options, patient's knees were injected with 2 cc Synvisc and left lateral epicondyle was injected with 4 cc Marcaine and 1/2 cc Celestone. There is no need for surgery at this time. She will follow up in 1 week for Synvisc #2.      Scribed by Marge Orozco  ([address]) as dictated by Erin Venegas MD

## 2020-08-21 ENCOUNTER — OFFICE VISIT (OUTPATIENT)
Dept: ORTHOPEDIC SURGERY | Facility: CLINIC | Age: 70
End: 2020-08-21

## 2020-08-21 VITALS
WEIGHT: 240.8 LBS | TEMPERATURE: 96.8 F | BODY MASS INDEX: 41.11 KG/M2 | RESPIRATION RATE: 16 BRPM | OXYGEN SATURATION: 97 % | DIASTOLIC BLOOD PRESSURE: 79 MMHG | SYSTOLIC BLOOD PRESSURE: 184 MMHG | HEIGHT: 64 IN | HEART RATE: 77 BPM

## 2020-08-21 DIAGNOSIS — G89.29 CHRONIC PAIN OF RIGHT KNEE: ICD-10-CM

## 2020-08-21 DIAGNOSIS — M17.11 PRIMARY OSTEOARTHRITIS OF RIGHT KNEE: ICD-10-CM

## 2020-08-21 DIAGNOSIS — M25.562 CHRONIC PAIN OF LEFT KNEE: ICD-10-CM

## 2020-08-21 DIAGNOSIS — M25.561 CHRONIC PAIN OF RIGHT KNEE: ICD-10-CM

## 2020-08-21 DIAGNOSIS — G89.29 CHRONIC PAIN OF LEFT KNEE: ICD-10-CM

## 2020-08-21 DIAGNOSIS — M17.12 PRIMARY OSTEOARTHRITIS OF LEFT KNEE: Primary | ICD-10-CM

## 2020-08-21 RX ORDER — HYLAN G-F 20 16MG/2ML
16 SYRINGE (ML) INTRAARTICULAR
Qty: 1 SYRINGE | Refills: 0
Start: 2020-08-21 | End: 2020-08-21

## 2020-08-21 NOTE — PROGRESS NOTES
Patient: Antony Rosas                MRN: [id number]       SSN: [id number]  YOB: 1950        AGE: 78 y.o. SEX: female  Body mass index is 41.33 kg/m². PCP: Christopher Menon MD  08/21/20    Chief Complaint   Patient presents with    Knee Pain     medina knee pain     HISTORY:  Antony Rosas is a 78 y.o. female who is seen for bilateral knee pain. ICD-10-CM ICD-9-CM    1. Primary osteoarthritis of left knee  M17.12 715.16 hylan g-f 20 (Synvisc) 16 mg/2 mL injection      LA SYNVISC OR SYNVISC-ONE      LA DRAIN/INJECT LARGE JOINT/BURSA   2. Chronic pain of left knee  M25.562 719.46 hylan g-f 20 (Synvisc) 16 mg/2 mL injection    G89.29 338.29 [place] SYNVISC OR SYNVISC-ONE      LA DRAIN/INJECT LARGE JOINT/BURSA   3. Primary osteoarthritis of right knee  M17.11 715.16 hylan g-f 20 (Synvisc) 16 mg/2 mL injection      LA SYNVISC OR SYNVISC-ONE      LA DRAIN/INJECT LARGE JOINT/BURSA   4. Chronic pain of right knee  M25.561 719.46 hylan g-f 20 (Synvisc) 16 mg/2 mL injection    G89.29 338.29 LA SYNVISC OR SYNVISC-ONE      LA DRAIN/INJECT LARGE JOINT/BURSA       Chart reviewed for the following:   Mahamed Donato MD, have reviewed the History, Physical and updated the Allergic reactions for [address] performed immediately prior to start of procedure:  Mahamed Donato MD, have performed the following reviews on Antony Rosas prior to the start of the procedure:            * Patient was identified by name and date of birth   * Agreement on procedure being performed was verified  * Risks and Benefits explained to the patient  * Procedure site verified and marked as necessary  * Patient was positioned for comfort  * Consent was obtained     Time: 3:51 PM     Date of procedure: 8/21/2020    Procedure performed by:  Rhona Patel MD    Ms. Yin tolerated the procedure well with no complications.     PLAN:  After discussing treatment options, patient's knees were injected with 2 cc of Synvisc. Ms. Pablito Olivas will follow up in one week to continue her visco supplementation injection series.       Scribed by Ata Waters ([facility]) as dictated by Ladan Barry MD

## 2020-08-28 ENCOUNTER — OFFICE VISIT (OUTPATIENT)
Dept: ORTHOPEDIC SURGERY | Facility: CLINIC | Age: 70
End: 2020-08-28

## 2020-08-28 VITALS
DIASTOLIC BLOOD PRESSURE: 74 MMHG | RESPIRATION RATE: 16 BRPM | SYSTOLIC BLOOD PRESSURE: 146 MMHG | WEIGHT: 237.2 LBS | HEART RATE: 99 BPM | OXYGEN SATURATION: 99 % | HEIGHT: 64 IN | TEMPERATURE: 97 F | BODY MASS INDEX: 40.49 KG/M2

## 2020-08-28 DIAGNOSIS — M25.561 CHRONIC PAIN OF RIGHT KNEE: ICD-10-CM

## 2020-08-28 DIAGNOSIS — M17.11 PRIMARY OSTEOARTHRITIS OF RIGHT KNEE: ICD-10-CM

## 2020-08-28 DIAGNOSIS — G89.29 CHRONIC PAIN OF LEFT KNEE: ICD-10-CM

## 2020-08-28 DIAGNOSIS — M17.12 PRIMARY OSTEOARTHRITIS OF LEFT KNEE: Primary | ICD-10-CM

## 2020-08-28 DIAGNOSIS — M25.562 CHRONIC PAIN OF LEFT KNEE: ICD-10-CM

## 2020-08-28 DIAGNOSIS — G89.29 CHRONIC PAIN OF RIGHT KNEE: ICD-10-CM

## 2020-08-28 RX ORDER — HYLAN G-F 20 16MG/2ML
16 SYRINGE (ML) INTRAARTICULAR
Qty: 1 SYRINGE | Refills: 0
Start: 2020-08-28 | End: 2020-08-28

## 2020-08-28 NOTE — PROGRESS NOTES
Patient: Lupillo Tinsley                MRN: [id number]       SSN: [id number]  YOB: 1950        AGE: 78 y.o. SEX: female  Body mass index is 40.72 kg/m². PCP: Marily Lombard, MD  08/28/20    Chief Complaint   Patient presents with    Knee Pain     Bilateral     HISTORY:  Lupillo Tinsley is a 78 y.o. female who is seen for bilateral knee pain. TIME OUT performed immediately prior to start of procedure:  Latricia Baumann MD, have performed the following reviews on Lupillo Tinsley prior to the start of the procedure:            * Patient was identified by name and date of birth   * Agreement on procedure being performed was verified  * Risks and Benefits explained to the patient  * Procedure site verified and marked as necessary  * Patient was positioned for comfort  * Consent was obtained     Time: 4:19 PM     Date of procedure: 8/28/2020    Procedure performed by:  Riya Jimenez MD    Ms. Yin tolerated the procedure well with no complications      EVS-18-VC ICD-9-CM    1. Primary osteoarthritis of left knee  M17.12 715.16 hylan g-f 20 (Synvisc) 16 mg/2 mL injection      HI SYNVISC OR SYNVISC-ONE      HI DRAIN/INJECT LARGE JOINT/BURSA   2. Chronic pain of left knee  M25.562 719.46 hylan g-f 20 (Synvisc) 16 mg/2 mL injection    G89.29 338.29 HI SYNVISC OR SYNVISC-ONE      HI DRAIN/INJECT LARGE JOINT/BURSA   3. Primary osteoarthritis of right knee  M17.11 715.16 hylan g-f 20 (Synvisc) 16 mg/2 mL injection      HI SYNVISC OR SYNVISC-ONE      HI DRAIN/INJECT LARGE JOINT/BURSA   4. Chronic pain of right knee  M25.561 719.46 hylan g-f 20 (Synvisc) 16 mg/2 mL injection    G89.29 338.29 HI SYNVISC OR SYNVISC-ONE      HI DRAIN/INJECT LARGE JOINT/BURSA     PLAN:  After discussing treatment options, patient's knees were injected with 2 cc of Synvisc. Ms. Felicitas Fletcher will follow up PRN now that she has completed her visco supplementation injection series.       Scribed by [employer] ([address]) as dictated by Saumya Donaldson MD

## 2020-08-28 NOTE — PROGRESS NOTES
1. Have you been to the ER, urgent care clinic since your last visit? Hospitalized since your last visit? No    2. Have you seen or consulted any other health care providers outside of the [address] [place] [address] since your last visit? Include any pap smears or colon screening.  No

## 2020-09-10 ENCOUNTER — LAB ONLY (OUTPATIENT)
Dept: ONCOLOGY | Age: 70
End: 2020-09-10

## 2020-09-10 ENCOUNTER — HOSPITAL ENCOUNTER (OUTPATIENT)
Dept: LAB | Age: 70
Discharge: HOME | End: 2020-09-10
Payer: MEDICARE

## 2020-09-10 DIAGNOSIS — D50.9 IRON DEFICIENCY ANEMIA, UNSPECIFIED IRON DEFICIENCY ANEMIA TYPE: Primary | ICD-10-CM

## 2020-09-10 DIAGNOSIS — D50.9 IRON DEFICIENCY ANEMIA, UNSPECIFIED IRON DEFICIENCY ANEMIA TYPE: ICD-10-CM

## 2020-09-10 DIAGNOSIS — D64.9 CHRONIC ANEMIA: ICD-10-CM

## 2020-09-10 DIAGNOSIS — E53.8 LOW SERUM VITAMIN B12: ICD-10-CM

## 2020-09-10 LAB
ALBUMIN SERPL-MCNC: 3.5 G/DL (ref 3.4–5)
ALBUMIN/GLOB SERPL: 1.2 {RATIO} (ref 0.8–1.7)
ALP SERPL-CCNC: 157 U/L (ref 45–117)
ALT SERPL-CCNC: 28 U/L (ref 13–56)
ANION GAP SERPL CALC-SCNC: 6 MMOL/L (ref 3–18)
AST SERPL-CCNC: 16 U/L (ref 10–38)
BASOPHILS # BLD: 0 K/UL (ref 0–0.1)
BASOPHILS NFR BLD: 0 % (ref 0–2)
BILIRUB SERPL-MCNC: 0.4 MG/DL (ref 0.2–1)
BUN SERPL-MCNC: 22 MG/DL (ref 7–18)
BUN/CREAT SERPL: 17 (ref 12–20)
CALCIUM SERPL-MCNC: 9.3 MG/DL (ref 8.5–10.1)
CHLORIDE SERPL-SCNC: 115 MMOL/L (ref 100–111)
CO2 SERPL-SCNC: 22 MMOL/L (ref 21–32)
CREAT SERPL-MCNC: 1.33 MG/DL (ref 0.6–1.3)
CRP SERPL-MCNC: 1.1 MG/DL (ref 0–0.3)
DIFFERENTIAL METHOD BLD: ABNORMAL
EOSINOPHIL # BLD: 0.1 K/UL (ref 0–0.4)
EOSINOPHIL NFR BLD: 2 % (ref 0–5)
ERYTHROCYTE [DISTWIDTH] IN BLOOD BY AUTOMATED COUNT: 17.5 % (ref 11.6–14.5)
ERYTHROCYTE [SEDIMENTATION RATE] IN BLOOD: 18 MM/HR (ref 0–30)
FERRITIN SERPL-MCNC: 197 NG/ML (ref 8–388)
FOLATE SERPL-MCNC: 12.4 NG/ML (ref 3.1–17.5)
GLOBULIN SER CALC-MCNC: 2.9 G/DL (ref 2–4)
GLUCOSE SERPL-MCNC: 104 MG/DL (ref 74–99)
HCT VFR BLD AUTO: 35 % (ref 35–45)
HGB BLD-MCNC: 10.8 G/DL (ref 12–16)
IRON SATN MFR SERPL: 25 % (ref 20–50)
IRON SERPL-MCNC: 62 UG/DL (ref 50–175)
LYMPHOCYTES # BLD: 2.2 K/UL (ref 0.9–3.6)
LYMPHOCYTES NFR BLD: 43 % (ref 21–52)
MCH RBC QN AUTO: 27.9 PG (ref 24–34)
MCHC RBC AUTO-ENTMCNC: 30.9 G/DL (ref 31–37)
MCV RBC AUTO: 90.4 FL (ref 74–97)
MONOCYTES # BLD: 0.4 K/UL (ref 0.05–1.2)
MONOCYTES NFR BLD: 7 % (ref 3–10)
NEUTS SEG # BLD: 2.4 K/UL (ref 1.8–8)
NEUTS SEG NFR BLD: 48 % (ref 40–73)
PLATELET # BLD AUTO: 360 K/UL (ref 135–420)
PMV BLD AUTO: 9.9 FL (ref 9.2–11.8)
POTASSIUM SERPL-SCNC: 4.2 MMOL/L (ref 3.5–5.5)
PROT SERPL-MCNC: 6.4 G/DL (ref 6.4–8.2)
RBC # BLD AUTO: 3.87 M/UL (ref 4.2–5.3)
RETICS/RBC NFR AUTO: 2.8 % (ref 0.5–2.3)
SODIUM SERPL-SCNC: 143 MMOL/L (ref 136–145)
TIBC SERPL-MCNC: 246 UG/DL (ref 250–450)
VIT B12 SERPL-MCNC: 224 PG/ML (ref 211–911)
WBC # BLD AUTO: 5 K/UL (ref 4.6–13.2)

## 2020-09-10 PROCEDURE — 82784 ASSAY IGA/IGD/IGG/IGM EACH: CPT

## 2020-09-10 PROCEDURE — 36415 COLL VENOUS BLD VENIPUNCTURE: CPT

## 2020-09-10 PROCEDURE — 86140 C-REACTIVE PROTEIN: CPT

## 2020-09-10 PROCEDURE — 85025 COMPLETE CBC W/AUTO DIFF WBC: CPT

## 2020-09-10 PROCEDURE — 83540 ASSAY OF IRON: CPT

## 2020-09-10 PROCEDURE — 82607 VITAMIN B-12: CPT

## 2020-09-10 PROCEDURE — 80053 COMPREHEN METABOLIC PANEL: CPT

## 2020-09-10 PROCEDURE — 82728 ASSAY OF FERRITIN: CPT

## 2020-09-10 PROCEDURE — 85045 AUTOMATED RETICULOCYTE COUNT: CPT

## 2020-09-10 PROCEDURE — 85652 RBC SED RATE AUTOMATED: CPT

## 2020-09-14 LAB
ALBUMIN SERPL ELPH-MCNC: 3.6 G/DL (ref 2.9–4.4)
ALBUMIN/GLOB SERPL: 1.4 {RATIO} (ref 0.7–1.7)
ALPHA1 GLOB SERPL ELPH-MCNC: 0.2 G/DL (ref 0–0.4)
ALPHA2 GLOB SERPL ELPH-MCNC: 1 G/DL (ref 0.4–1)
B-GLOBULIN SERPL ELPH-MCNC: 0.9 G/DL (ref 0.7–1.3)
GAMMA GLOB SERPL ELPH-MCNC: 0.5 G/DL (ref 0.4–1.8)
GLOBULIN SER-MCNC: 2.6 G/DL (ref 2.2–3.9)
IGA SERPL-MCNC: 103 MG/DL (ref 87–352)
IGG SERPL-MCNC: 578 MG/DL (ref 586–1602)
IGM SERPL-MCNC: 99 MG/DL (ref 26–217)
INTERPRETATION SERPL IEP-IMP: ABNORMAL
KAPPA LC FREE SER-MCNC: 20.1 MG/L (ref 3.3–19.4)
KAPPA LC FREE/LAMBDA FREE SER: 1.5 {RATIO} (ref 0.26–1.65)
LAMBDA LC FREE SERPL-MCNC: 13.4 MG/L (ref 5.7–26.3)
M PROTEIN SERPL ELPH-MCNC: ABNORMAL G/DL
PROT SERPL-MCNC: 6.2 G/DL (ref 6–8.5)

## 2020-09-15 ENCOUNTER — VIRTUAL VISIT (OUTPATIENT)
Dept: ONCOLOGY | Age: 70
End: 2020-09-15

## 2020-09-15 DIAGNOSIS — E53.8 LOW SERUM VITAMIN B12: Primary | ICD-10-CM

## 2020-09-15 DIAGNOSIS — D64.9 CHRONIC ANEMIA: ICD-10-CM

## 2020-09-15 RX ORDER — LANOLIN ALCOHOL/MO/W.PET/CERES
1000 CREAM (GRAM) TOPICAL DAILY
Qty: 90 TAB | Refills: 1 | Status: SHIPPED | OUTPATIENT
Start: 2020-09-15 | End: 2020-10-23

## 2020-09-15 NOTE — PROGRESS NOTES
John Perry is a 78 y.o. female, evaluated via audio-only technology on 9/15/2020 . Assessment & Plan:   Normocytic anemia   history of iron deficiency anemia  -- Today I have reviewed with the patient about recent work-ups. 9/10/2020  CBC showed H/H 10.8/35, platelet 906R,   ---WBC 5.0k. ANC of 2.4  ---Iron studies showed iron saturation 25% with ferritin 197.  ---  The immunofixation pattern appears unremarkable. Evidence of monoclonal protein is not apparent. -- I have discussed with the patient her lab result     Plan:  --Venofer  completed  On 7/27/2020    --We will plan to repeat her CBC, iron profile and ferritin, B12/folate in about 12 weeks . --The patient will be referred to GI for further assessment of JAN. --patient has been advise to start taking folic acid and vitamin B12 supplement once a day. -- We will see the patient back in clinic in about 3 months. Always sooner if required. The patient can have lab done prior our next clinic visit.        12  Subjective:     Ms. Estephania Acosta is 81y.o. year old female who was seen for management of anemia. She has been taking iron pills for her iron deficiency. She noted having some constipation with iron pills. The patient denies  fatigue, low energy and report that the iron infusion has help. She denied any obvious bleeding. The patient otherwise has no other complaints. Denied fever, chills, night sweat, unintentional weight loss, skin lumps or bumps, acute bleeding or bruising issues. No acute bleeding, blood in stool, dark stool, melena, hematochezia, hemoptysis, dark urine, or easily bruising. Denied headache, acute vision change, dizziness, chest pain, palpitation, productive cough, nausea, vomiting, abdominal pain, altered bowel habits, dysuria, new bone pain or back pain, focal numbness or weakness. Independent with ADLs and IADLs.    Prior to Admission medications    Medication Sig Start Date End Date Taking?  Authorizing Provider sacubitriL-valsartan (Entresto)  mg tablet Take 1 Tab by mouth two (2) times a day. 8/13/20   Diya Orellana MD   carvediloL (COREG) 25 mg tablet Take 1 Tab by mouth two (2) times daily (with meals). 8/13/20   Diya Orellana MD   furosemide (LASIX) 40 mg tablet Take 1 Tab by mouth two (2) times daily as needed (edema). 8/13/20   Diya Orellana MD   atorvastatin (LIPITOR) 80 mg tablet Take 1 Tab by mouth daily. 8/13/20   Diya Orellana MD   pantoprazole (PROTONIX) 40 mg tablet Take 1 Tab by mouth daily. 10/27/19   Ricco Huerta DO   tizanidine HCl (TIZANIDINE PO) Take  by mouth. Tg Nielsen MD   albuterol (PROVENTIL HFA, VENTOLIN HFA, PROAIR HFA) 90 mcg/actuation inhaler Take 2 Puffs by inhalation every four (4) hours as needed for Wheezing. 9/30/19   Devendra Wolf MD   aspirin 81 mg chewable tablet Take 2 Tabs by mouth daily. 7/19/19   Kendra Rabago NP   insulin detemir (LEVEMIR) 100 unit/mL injection 15 units daily for diabetes 7/20/17   Iqra Irizarry DNP   desloratadine (CLARINEX) 5 mg tablet Take 1 Tab by mouth daily as needed for Allergies. 4/26/17   Alma Irizarry DNP   mometasone (NASONEX) 50 mcg/actuation nasal spray 2 Sprays by Both Nostrils route daily as needed. For allergies 4/26/17   [facility] BETTY DOMINGUEZ   umeclidinium-vilanterol (ANORO ELLIPTA) 62.5-25 mcg/actuation inhaler Take 1 Puff by inhalation daily. For COPD 4/26/17   Judy Roger DNP       Review of Systems   Constitutional: Negative. HENT: Negative. Eyes: Negative. Respiratory: Negative. Cardiovascular: Negative. Gastrointestinal: Negative. Genitourinary: Negative. Musculoskeletal: Negative. Skin: Negative. Neurological: Negative. Endo/Heme/Allergies: Negative. Psychiatric/Behavioral: Negative.       CT Results (most recent):  Results from Hospital Encounter encounter on 10/18/19   CTA CHEST W OR W WO CONT    Narrative EXAM: CTA CHEST W OR W WO CONT    INDICATIONS: SOB on oxygen    TECHNIQUE: Utilizing pulmonary embolus protocol, thin section axial images were  obtained from the thoracic inlet into the upper abdomen after the uneventful  administration of IV contrast. Coronal and sagittal maximum intensity projection  (MIP) post-processed images were generated to better define pulmonary artery  anatomy and enhance sensitivity for detection of pulmonary emboli. Contrast  used: 72 cc Isovue-370    CT scans at this facility are performed using dose optimization technique as  appropriate with performed exam, to include automated exposure control,  adjustment of mA and/or kV according to patient's size (including appropriate  matching for site-specific examinations), or use of iterative reconstruction  technique. COMPARISON: May 2019    FINDINGS:    Suboptimal quality opacification. Decreased resolution due to body habitus. Contrast timing is of good quality however. Pulmonary arteries: No large/central PE. No suspicious peripheral focus. The  main pulmonary artery is dilated at 3.6 cm diameter. Mediastinum: No adenopathy. Normal caliber aorta with mild atherosclerosis. Normal heart size without pericardial effusion. Enteric tube courses into the  stomach. ET tube unremarkable. Lungs/pleura: Respiratory motion artifact limits evaluation. Small to moderate  amount of ill-defined consolidation within the lower lungs. Groundglass  inflammation within the remainder of the lungs with some septal thickening. Small bilateral pleural effusions. Chest soft tissues: Unremarkable. Abdomen: Unremarkable. Bones: No acute findings. Unremarkable for age. Impression IMPRESSION[de-identified]    1.  Negative for acute PE.  -Enlarged main pulmonary artery as can be seen with pulmonary arterial  hypertension; appearance as before    2. Ill-defined bilateral lung consolidation, likely pneumonia.  There is an  element of fluid overload with lung edema as well as small pleural effusions. No flowsheet data found. Jil Johnson, who was evaluated through a patient-initiated, synchronous (real-time) audio only encounter, and/or her healthcare decision maker, is aware that it is a billable service, with coverage as determined by her insurance carrier. She provided verbal consent to proceed: Consent obtained within past 12 months Yes. . She has not had a related appointment within my department in the past 7 days or scheduled within the next 24 hours.       Total Time: 20 minutes     Radha Graham NP

## 2020-10-09 DIAGNOSIS — I50.22 CHRONIC SYSTOLIC CONGESTIVE HEART FAILURE (HCC): ICD-10-CM

## 2020-10-09 DIAGNOSIS — I10 ESSENTIAL HYPERTENSION: ICD-10-CM

## 2020-10-09 RX ORDER — SACUBITRIL AND VALSARTAN 97; 103 MG/1; MG/1
1 TABLET, FILM COATED ORAL 2 TIMES DAILY
Qty: 180 TAB | Refills: 2 | OUTPATIENT
Start: 2020-10-09

## 2020-10-09 RX ORDER — SACUBITRIL AND VALSARTAN 97; 103 MG/1; MG/1
1 TABLET, FILM COATED ORAL 2 TIMES DAILY
Qty: 180 TAB | Refills: 2 | Status: SHIPPED | OUTPATIENT
Start: 2020-10-09 | End: 2021-01-25 | Stop reason: SDUPTHER

## 2020-10-09 RX ORDER — CARVEDILOL 25 MG/1
25 TABLET ORAL 2 TIMES DAILY WITH MEALS
Qty: 180 TAB | Refills: 3 | Status: SHIPPED | OUTPATIENT
Start: 2020-10-09 | End: 2021-01-25 | Stop reason: SDUPTHER

## 2020-10-13 ENCOUNTER — OFFICE VISIT (OUTPATIENT)
Dept: ORTHOPEDIC SURGERY | Age: 70
End: 2020-10-13
Payer: MEDICARE

## 2020-10-13 VITALS
WEIGHT: 234 LBS | DIASTOLIC BLOOD PRESSURE: 81 MMHG | OXYGEN SATURATION: 97 % | BODY MASS INDEX: 39.95 KG/M2 | HEIGHT: 64 IN | SYSTOLIC BLOOD PRESSURE: 165 MMHG | HEART RATE: 80 BPM | RESPIRATION RATE: 18 BRPM | TEMPERATURE: 97.6 F

## 2020-10-13 DIAGNOSIS — G89.29 CHRONIC PAIN OF LEFT KNEE: ICD-10-CM

## 2020-10-13 DIAGNOSIS — S50.02XA CONTUSION OF LEFT ELBOW, INITIAL ENCOUNTER: Primary | ICD-10-CM

## 2020-10-13 DIAGNOSIS — M25.562 CHRONIC PAIN OF LEFT KNEE: ICD-10-CM

## 2020-10-13 DIAGNOSIS — M25.522 LEFT ELBOW PAIN: ICD-10-CM

## 2020-10-13 DIAGNOSIS — S80.02XA CONTUSION OF LEFT KNEE, INITIAL ENCOUNTER: ICD-10-CM

## 2020-10-13 DIAGNOSIS — M17.12 PRIMARY OSTEOARTHRITIS OF LEFT KNEE: ICD-10-CM

## 2020-10-13 PROCEDURE — G8427 DOCREV CUR MEDS BY ELIG CLIN: HCPCS | Performed by: SPECIALIST

## 2020-10-13 PROCEDURE — G8417 CALC BMI ABV UP PARAM F/U: HCPCS | Performed by: SPECIALIST

## 2020-10-13 PROCEDURE — 73080 X-RAY EXAM OF ELBOW: CPT | Performed by: SPECIALIST

## 2020-10-13 PROCEDURE — G9899 SCRN MAM PERF RSLTS DOC: HCPCS | Performed by: SPECIALIST

## 2020-10-13 PROCEDURE — G8754 DIAS BP LESS 90: HCPCS | Performed by: SPECIALIST

## 2020-10-13 PROCEDURE — G8753 SYS BP > OR = 140: HCPCS | Performed by: SPECIALIST

## 2020-10-13 PROCEDURE — 73562 X-RAY EXAM OF KNEE 3: CPT | Performed by: SPECIALIST

## 2020-10-13 PROCEDURE — 20610 DRAIN/INJ JOINT/BURSA W/O US: CPT | Performed by: SPECIALIST

## 2020-10-13 PROCEDURE — 3017F COLORECTAL CA SCREEN DOC REV: CPT | Performed by: SPECIALIST

## 2020-10-13 PROCEDURE — G8399 PT W/DXA RESULTS DOCUMENT: HCPCS | Performed by: SPECIALIST

## 2020-10-13 PROCEDURE — 3288F FALL RISK ASSESSMENT DOCD: CPT | Performed by: SPECIALIST

## 2020-10-13 PROCEDURE — 99213 OFFICE O/P EST LOW 20 MIN: CPT | Performed by: SPECIALIST

## 2020-10-13 PROCEDURE — 1090F PRES/ABSN URINE INCON ASSESS: CPT | Performed by: SPECIALIST

## 2020-10-13 PROCEDURE — G8510 SCR DEP NEG, NO PLAN REQD: HCPCS | Performed by: SPECIALIST

## 2020-10-13 PROCEDURE — 1100F PTFALLS ASSESS-DOCD GE2>/YR: CPT | Performed by: SPECIALIST

## 2020-10-13 PROCEDURE — G8536 NO DOC ELDER MAL SCRN: HCPCS | Performed by: SPECIALIST

## 2020-10-13 RX ORDER — BETAMETHASONE SODIUM PHOSPHATE AND BETAMETHASONE ACETATE 3; 3 MG/ML; MG/ML
6 INJECTION, SUSPENSION INTRA-ARTICULAR; INTRALESIONAL; INTRAMUSCULAR; SOFT TISSUE ONCE
Qty: 0.5 ML | Refills: 0
Start: 2020-10-13 | End: 2020-10-13

## 2020-10-13 NOTE — PROGRESS NOTES
Patient: Dorota Kolb                MRN: [id number]       SSN: [id number]  YOB: 1950        AGE: 78 y.o. SEX: female      PCP: Yaya Wagner MD  10/13/20    Chief Complaint   Patient presents with    Knee Pain     Left    Elbow Pain     Left      HISTORY:  Dorota Kolb is a 78 y.o. female who is seen for left elbow pain and  increased left knee pain. She fell on 20 while she was walking through her house. She landed on her knee and struck her left elbow. She has had increased pain since. She has difficulty straightening out her elbow. She has been experiencing left elbow pain for the past 3 months. She has pain when lifting and gripping objects. She has been feeling increased knee pain since her fall. She notes pain when bending to water her flowers with a watering can. She even has difficulty putting on pants. She responded well to viscosupplementation in the past.  She recently completed a Synvisc series on 20. Pain Assessment  10/13/2020   Location of Pain Elbow;Knee   Location Modifiers Left   Severity of Pain 10   Quality of Pain Sharp; Aching   Quality of Pain Comment -   Duration of Pain Persistent   Frequency of Pain Constant   Aggravating Factors Straightening;Standing   Aggravating Factors Comment -   Limiting Behavior -   Relieving Factors Rest   Result of Injury -   Work-Related Injury -   Type of Injury Fall   Type of Injury Comment 2020     Occupation, etc:  Ms. Andressa Ruiz receives [address] [place] [address] for COPD and knee arthritis. She moved from [place] to [facility] with her  when her mother  several years ago. She moved from [facility] to [place] 10 years ago. She and her  live in [place]. She has 3 sons--1 in [place] and 2 in [facility] and 13 grandchildren. She has 2 Chihuahua-Poodle mix dogs--Frisky and Boots. Srikanth Games and Boots are 3 yo sisters. She gained weight recently. She is diabetic.  She is 234 pounds and 5'4\" tall.    Last 3 Recorded Weights in this Encounter    10/13/20 0950   Weight: 234 lb (106.1 kg)     Body mass index is 40.17 kg/m². REVIEW OF SYSTEMS: All Below are Negative except: See HPI     Constitutional: negative for fever, chills, and weight loss. Cardiovascular: negative for chest pain, claudication, leg swelling, SOB, PIERSON   Gastrointestinal: Negative for pain, N/V/C/D, Blood in stool or urine, dysuria,  hematuria, incontinence, pelvic pain. Musculoskeletal: See HPI   Neurological: Negative for dizziness and weakness. Negative for headaches, Visual changes, confusion, seizures   Phychiatric/Behavioral: Negative for depression, memory loss, substance  abuse. Extremities: Negative for hair changes, rash, or skin lesion changes. Hematologic: Negative for bleeding problems, bruising, pallor or swollen lymph  nodes   Peripheral Vascular: No calf pain, no circulation deficits.     Social History     Socioeconomic History    Marital status: LEGALLY      Spouse name: Not on file    Number of children: 3    Years of education: Not on file    Highest education level: Not on file   Occupational History    Occupation: retired   Social Needs    Financial resource strain: Not on file    Food insecurity     Worry: Not on file     Inability: Not on file   [employer] needs     Medical: Not on file     Non-medical: Not on file   Tobacco Use    Smoking status: Former Smoker     Packs/day: 0.25     Years: 48.00     Pack years: 12.00     Types: Cigarettes     Last attempt to quit: 2018     Years since quittin.4    Smokeless tobacco: Never Used   Substance and Sexual Activity    Alcohol use: No     Alcohol/week: 0.0 standard drinks    Drug use: No    Sexual activity: Yes     Partners: Male   Lifestyle    Physical activity     Days per week: Not on file     Minutes per session: Not on file    Stress: Not on file   Relationships    Social connections     Talks on phone: Not on file     Gets together: Not on file     Attends Jain service: Not on file     Active member of club or organization: Not on file     Attends meetings of clubs or organizations: Not on file     Relationship status: Not on file    Intimate partner violence     Fear of current or ex partner: Not on file     Emotionally abused: Not on file     Physically abused: Not on file     Forced sexual activity: Not on file   Other Topics Concern     Service No    Blood Transfusions No    Caffeine Concern No    Occupational Exposure No    Hobby Hazards No    Sleep Concern No    Stress Concern No    Weight Concern No    Special Diet No    Back Care No    Exercise No    Bike Helmet No    Seat Belt Yes    Self-Exams Yes   Social History Narrative    Not on file        No Known Allergies     Current Outpatient Medications   Medication Sig    sacubitriL-valsartan (Entresto)  mg tablet Take 1 Tab by mouth two (2) times a day.  carvediloL (COREG) 25 mg tablet Take 1 Tab by mouth two (2) times daily (with meals).  cyanocobalamin (Vitamin B-12) 1,000 mcg tablet Take 1 Tab by mouth daily.  furosemide (LASIX) 40 mg tablet Take 1 Tab by mouth two (2) times daily as needed (edema).  atorvastatin (LIPITOR) 80 mg tablet Take 1 Tab by mouth daily.  pantoprazole (PROTONIX) 40 mg tablet Take 1 Tab by mouth daily.  tizanidine HCl (TIZANIDINE PO) Take  by mouth.  albuterol (PROVENTIL HFA, VENTOLIN HFA, PROAIR HFA) 90 mcg/actuation inhaler Take 2 Puffs by inhalation every four (4) hours as needed for Wheezing.  aspirin 81 mg chewable tablet Take 2 Tabs by mouth daily.  insulin detemir (LEVEMIR) 100 unit/mL injection 15 units daily for diabetes    desloratadine (CLARINEX) 5 mg tablet Take 1 Tab by mouth daily as needed for Allergies.  mometasone (NASONEX) 50 mcg/actuation nasal spray 2 Sprays by Both Nostrils route daily as needed.  For allergies    umeclidinium-vilanterol [facility] ELLIPTA) 62.5-25 mcg/actuation inhaler Take 1 Puff by inhalation daily. For COPD     No current facility-administered medications for this visit. PHYSICAL EXAMINATION:  Visit Vitals  BP (!) 165/81 (BP 1 Location: Left arm, BP Patient Position: Sitting)   Pulse 80   Temp 97.6 °F (36.4 °C) (Oral)   Resp 18   Ht 5' 4\" (1.626 m)   Wt 234 lb (106.1 kg)   SpO2 97% Comment: RA   BMI 40.17 kg/m²        ORTHO EXAMINATION:  Examination Left knee Right knee   Skin Intact Intact   Range of motion 110-0 110-0   Effusion - -   Medial joint line tenderness + +   Lateral joint line tenderness - -   Popliteal tenderness - -   Osteophytes palpable + +   Mayurs - -   Patella crepitus + +   Anterior drawer - -   Lateral laxity - -   Medial laxity - -   Varus deformity - -   Valgus deformity - -   Pretibial edema - -   Calf tenderness - -   Pain over L tibial tubercle  Examination Right Elbow Left Elbow   Skin Intact Intact   Range of Motion 135-0 135-0   Tenderness - ++   Swelling - -   Bruising - -   Stability Normal Normal   Motor Strength  Normal Normal   Neurovascular Intact Intact        Chart reviewed for the following:   I, Naomy Salinas MD, have reviewed the History, Physical and updated the Allergic reactions for [address] performed immediately prior to start of procedure:  Mylene Patel MD, have performed the following reviews on [employer] prior to the start of the procedure:            * Patient was identified by name and date of birth   * Agreement on procedure being performed was verified  * Risks and Benefits explained to the patient  * Procedure site verified and marked as necessary  * Patient was positioned for comfort  * Consent was obtained     Time: 10:23 AM     Date of procedure: 10/13/2020    Procedure performed by:  Naomy Salinas MD    Ms. Yin tolerated the procedure well with no complications.     RADIOGRAPHS:  XR LEFT KNEE 10/13/20 Z[facility]  IMPRESSION:  Three views - No fractures, no effusion, moderately severe joint space narrowing, + osteophytes present. Kellgren Dariel grade 3    XR LEFT ELBOW 10/13/20 ZAK  IMPRESSION:  Two views - No fractures, negative fat pad sign, no joint space narrowing. XR BILAT KNEE 1/16/20 ZAK  IMPRESSION:  Three views - No fractures, no effusion, moderate medial joint space narrowing, + osteophytes present. Kellgren Dariel grade 2     IMPRESSION:      ICD-10-CM ICD-9-CM    1. Contusion of left elbow, initial encounter  S50. 02XA 923.11 REFERRAL TO PHYSICAL THERAPY   2. Left elbow pain  M25.522 719.42 AMB POC XRAY, ELBOW; COMPLETE, 3+ VIE   3. Chronic pain of left knee  M25.562 719.46 AMB POC X-RAY KNEE 3 VIEW    G89.29 338.29 betamethasone (Celestone Soluspan) 6 mg/mL injection      BETAMETHASONE ACETATE & SODIUM PHOSPHATE INJECTION 3 MG EA.      DRAIN/INJECT LARGE JOINT/BURSA   4. Contusion of left knee, initial encounter  S80. 02XA 924.11 REFERRAL TO PHYSICAL THERAPY   5. Primary osteoarthritis of left knee  M17.12 715.16 betamethasone (Celestone Soluspan) 6 mg/mL injection      BETAMETHASONE ACETATE & SODIUM PHOSPHATE INJECTION 3 MG EA.      DRAIN/INJECT LARGE JOINT/BURSA     PLAN: She will start a brief course of outpatient physical therapy. discussing treatment options, patient's left knee was injected with 4 cc Marcaine and 1/2 cc Celestone. There is no need for surgery at this time. She will follow up as needed.     Scribed by Doreen Sutton  (Jeremi Ortiz) as dictated by David Fournier MD

## 2020-10-13 NOTE — PROGRESS NOTES
1. Have you been to the ER, urgent care clinic since your last visit? Hospitalized since your last visit? No    2. Have you seen or consulted any other health care providers outside of the [address] [place] [address] since your last visit? Include any pap smears or colon screening.  No

## 2020-10-19 ENCOUNTER — HOSPITAL ENCOUNTER (OUTPATIENT)
Dept: MAMMOGRAPHY | Age: 70
Discharge: HOME | End: 2020-10-19
Attending: FAMILY MEDICINE
Payer: MEDICARE

## 2020-10-19 DIAGNOSIS — Z12.31 VISIT FOR SCREENING MAMMOGRAM: ICD-10-CM

## 2020-10-19 PROCEDURE — 77067 SCR MAMMO BI INCL CAD: CPT

## 2020-10-22 ENCOUNTER — HOSPITAL ENCOUNTER (EMERGENCY)
Age: 70
Discharge: HOME | End: 2020-10-22
Attending: STUDENT IN AN ORGANIZED HEALTH CARE EDUCATION/TRAINING PROGRAM | Admitting: STUDENT IN AN ORGANIZED HEALTH CARE EDUCATION/TRAINING PROGRAM
Payer: MEDICARE

## 2020-10-22 ENCOUNTER — APPOINTMENT (OUTPATIENT)
Dept: GENERAL RADIOLOGY | Age: 70
End: 2020-10-22
Attending: PHYSICIAN ASSISTANT
Payer: MEDICARE

## 2020-10-22 ENCOUNTER — APPOINTMENT (OUTPATIENT)
Dept: CT IMAGING | Age: 70
End: 2020-10-22
Attending: PHYSICIAN ASSISTANT
Payer: MEDICARE

## 2020-10-22 VITALS
OXYGEN SATURATION: 98 % | DIASTOLIC BLOOD PRESSURE: 64 MMHG | SYSTOLIC BLOOD PRESSURE: 137 MMHG | HEART RATE: 63 BPM | TEMPERATURE: 97 F | RESPIRATION RATE: 18 BRPM

## 2020-10-22 DIAGNOSIS — R73.9 HYPERGLYCEMIA: ICD-10-CM

## 2020-10-22 DIAGNOSIS — M54.6 ACUTE LEFT-SIDED THORACIC BACK PAIN: Primary | ICD-10-CM

## 2020-10-22 DIAGNOSIS — R07.9 CHEST PAIN, UNSPECIFIED TYPE: ICD-10-CM

## 2020-10-22 LAB
ANION GAP SERPL CALC-SCNC: 4 MMOL/L (ref 3–18)
BASOPHILS # BLD: 0 K/UL (ref 0–0.1)
BASOPHILS NFR BLD: 0 % (ref 0–2)
BUN SERPL-MCNC: 25 MG/DL (ref 7–18)
BUN/CREAT SERPL: 17 (ref 12–20)
CALCIUM SERPL-MCNC: 9.4 MG/DL (ref 8.5–10.1)
CHLORIDE SERPL-SCNC: 107 MMOL/L (ref 100–111)
CK MB CFR SERPL CALC: NORMAL % (ref 0–4)
CK MB SERPL-MCNC: <1 NG/ML (ref 5–25)
CK SERPL-CCNC: 52 U/L (ref 26–192)
CO2 SERPL-SCNC: 27 MMOL/L (ref 21–32)
CREAT SERPL-MCNC: 1.49 MG/DL (ref 0.6–1.3)
DIFFERENTIAL METHOD BLD: ABNORMAL
EOSINOPHIL # BLD: 0.1 K/UL (ref 0–0.4)
EOSINOPHIL NFR BLD: 2 % (ref 0–5)
ERYTHROCYTE [DISTWIDTH] IN BLOOD BY AUTOMATED COUNT: 15.7 % (ref 11.6–14.5)
GLUCOSE SERPL-MCNC: 169 MG/DL (ref 74–99)
HCT VFR BLD AUTO: 37.6 % (ref 35–45)
HGB BLD-MCNC: 11.7 G/DL (ref 12–16)
LYMPHOCYTES # BLD: 2.1 K/UL (ref 0.9–3.6)
LYMPHOCYTES NFR BLD: 38 % (ref 21–52)
MCH RBC QN AUTO: 28.3 PG (ref 24–34)
MCHC RBC AUTO-ENTMCNC: 31.1 G/DL (ref 31–37)
MCV RBC AUTO: 90.8 FL (ref 74–97)
MONOCYTES # BLD: 0.5 K/UL (ref 0.05–1.2)
MONOCYTES NFR BLD: 9 % (ref 3–10)
NEUTS SEG # BLD: 2.9 K/UL (ref 1.8–8)
NEUTS SEG NFR BLD: 51 % (ref 40–73)
PLATELET # BLD AUTO: 343 K/UL (ref 135–420)
PMV BLD AUTO: 9.9 FL (ref 9.2–11.8)
POTASSIUM SERPL-SCNC: 4.5 MMOL/L (ref 3.5–5.5)
RBC # BLD AUTO: 4.14 M/UL (ref 4.2–5.3)
SODIUM SERPL-SCNC: 138 MMOL/L (ref 136–145)
TROPONIN I SERPL-MCNC: <0.02 NG/ML (ref 0–0.04)
TROPONIN I SERPL-MCNC: <0.02 NG/ML (ref 0–0.04)
WBC # BLD AUTO: 5.6 K/UL (ref 4.6–13.2)

## 2020-10-22 PROCEDURE — 85025 COMPLETE CBC W/AUTO DIFF WBC: CPT

## 2020-10-22 PROCEDURE — 96374 THER/PROPH/DIAG INJ IV PUSH: CPT

## 2020-10-22 PROCEDURE — 96375 TX/PRO/DX INJ NEW DRUG ADDON: CPT

## 2020-10-22 PROCEDURE — 80048 BASIC METABOLIC PNL TOTAL CA: CPT

## 2020-10-22 PROCEDURE — 71045 X-RAY EXAM CHEST 1 VIEW: CPT

## 2020-10-22 PROCEDURE — 74011250637 HC RX REV CODE- 250/637: Performed by: PHYSICIAN ASSISTANT

## 2020-10-22 PROCEDURE — 99284 EMERGENCY DEPT VISIT MOD MDM: CPT

## 2020-10-22 PROCEDURE — 93005 ELECTROCARDIOGRAM TRACING: CPT

## 2020-10-22 PROCEDURE — 74011000636 HC RX REV CODE- 636: Performed by: STUDENT IN AN ORGANIZED HEALTH CARE EDUCATION/TRAINING PROGRAM

## 2020-10-22 PROCEDURE — 82550 ASSAY OF CK (CPK): CPT

## 2020-10-22 PROCEDURE — 74011250636 HC RX REV CODE- 250/636: Performed by: PHYSICIAN ASSISTANT

## 2020-10-22 PROCEDURE — 71275 CT ANGIOGRAPHY CHEST: CPT

## 2020-10-22 RX ORDER — ASPIRIN 325 MG
325 TABLET ORAL
Status: COMPLETED | OUTPATIENT
Start: 2020-10-22 | End: 2020-10-22

## 2020-10-22 RX ORDER — ACETAMINOPHEN 500 MG
1000 TABLET ORAL
Qty: 20 TAB | Refills: 0 | Status: SHIPPED | OUTPATIENT
Start: 2020-10-22 | End: 2021-01-25 | Stop reason: ALTCHOICE

## 2020-10-22 RX ORDER — LIDOCAINE 50 MG/G
PATCH TOPICAL
Qty: 1 PACKAGE | Refills: 0 | Status: ON HOLD | OUTPATIENT
Start: 2020-10-22 | End: 2021-12-18

## 2020-10-22 RX ORDER — ONDANSETRON 2 MG/ML
4 INJECTION INTRAMUSCULAR; INTRAVENOUS
Status: COMPLETED | OUTPATIENT
Start: 2020-10-22 | End: 2020-10-22

## 2020-10-22 RX ORDER — MORPHINE SULFATE 2 MG/ML
2 INJECTION, SOLUTION INTRAMUSCULAR; INTRAVENOUS
Status: COMPLETED | OUTPATIENT
Start: 2020-10-22 | End: 2020-10-22

## 2020-10-22 RX ORDER — CYCLOBENZAPRINE HCL 5 MG
5 TABLET ORAL 2 TIMES DAILY
Qty: 10 TAB | Refills: 0 | Status: SHIPPED | OUTPATIENT
Start: 2020-10-22 | End: 2020-10-23

## 2020-10-22 RX ADMIN — IOPAMIDOL 78 ML: 755 INJECTION, SOLUTION INTRAVENOUS at 19:12

## 2020-10-22 RX ADMIN — MORPHINE SULFATE 2 MG: 2 INJECTION, SOLUTION INTRAMUSCULAR; INTRAVENOUS at 19:49

## 2020-10-22 RX ADMIN — ONDANSETRON 4 MG: 2 INJECTION INTRAMUSCULAR; INTRAVENOUS at 19:50

## 2020-10-22 RX ADMIN — ASPIRIN 325 MG ORAL TABLET 325 MG: 325 PILL ORAL at 19:49

## 2020-10-22 RX ADMIN — SODIUM CHLORIDE 250 ML: 900 INJECTION, SOLUTION INTRAVENOUS at 19:52

## 2020-10-22 NOTE — ED PROVIDER NOTES
EMERGENCY DEPARTMENT HISTORY AND PHYSICAL EXAM    6:02 PM      Date: 10/22/2020  Patient Name: Rodney William    History of Presenting Illness     No chief complaint on file. History Provided By: Patient    Additional History (Context): Rodney William is a 78 y.o. female with hx of HTN, DM, HLD, COPD, obesity, MI, CAD who presents with complaint of left sided scapular pain associated with left-sided chest pain x3 days. Patient notes pain is sharp and only occurs with movement. Patient notes scapular pain radiates into the left lower back area as well. Patient notes associated muscle spasms and tightness. Patient denies diaphoresis, dizziness, dyspnea, orthopnea, nausea or vomiting, abdominal pain, leg edema, numbness or tingling, urinary bowel incontinence or retention, injury or trauma. Denies pleuritic or exertional symptoms. Patient notes symptoms do not feel similar to previous MI. Did not take any medication for the pain prior to arrival.    PCP: Viktor Garcia MD    Current Outpatient Medications   Medication Sig Dispense Refill    acetaminophen (Tylenol Extra Strength) 500 mg tablet Take 2 Tabs by mouth every six (6) hours as needed for Pain. 20 Tab 0    lidocaine (Lidoderm) 5 % Apply patch to the affected area for 12 hours a day and remove for 12 hours a day. 1 Package 0    cyclobenzaprine (FLEXERIL) 5 mg tablet Take 1 Tab by mouth two (2) times a day. 10 Tab 0    sacubitriL-valsartan (Entresto)  mg tablet Take 1 Tab by mouth two (2) times a day. 180 Tab 2    carvediloL (COREG) 25 mg tablet Take 1 Tab by mouth two (2) times daily (with meals). 180 Tab 3    cyanocobalamin (Vitamin B-12) 1,000 mcg tablet Take 1 Tab by mouth daily. 90 Tab 1    furosemide (LASIX) 40 mg tablet Take 1 Tab by mouth two (2) times daily as needed (edema). 180 Tab 1    atorvastatin (LIPITOR) 80 mg tablet Take 1 Tab by mouth daily. 90 Tab 3    pantoprazole (PROTONIX) 40 mg tablet Take 1 Tab by mouth daily.  30 Tab 0  albuterol (PROVENTIL HFA, VENTOLIN HFA, PROAIR HFA) 90 mcg/actuation inhaler Take 2 Puffs by inhalation every four (4) hours as needed for Wheezing. 1 Inhaler 0    aspirin 81 mg chewable tablet Take 2 Tabs by mouth daily. 60 Tab 0    insulin detemir (LEVEMIR) 100 unit/mL injection 15 units daily for diabetes 2 Vial 0    desloratadine (CLARINEX) 5 mg tablet Take 1 Tab by mouth daily as needed for Allergies. 30 Tab 0    mometasone (NASONEX) 50 mcg/actuation nasal spray 2 Sprays by Both Nostrils route daily as needed. For allergies 3 Container 3    umeclidinium-vilanterol (ANORO ELLIPTA) 62.5-25 mcg/actuation inhaler Take 1 Puff by inhalation daily. For COPD 3 Inhaler 3       Past History     Past Medical History:  Past Medical History:   Diagnosis Date    Abnormal WBC count 5/17/2016    Anemia     Bilateral shoulder pain 9/27/2016    Chondromalacia of both patellae     COPD (chronic obstructive pulmonary disease) ([address] 9/2015    mild-mod dz; Dr Maxx Mary    Diabetes [facility]) 2013    Diabetic eye exam ([facility] 2016    Dilated cardiomyopathy ([facility]     Dyslipidemia     Gout     Heart attack ([facility]     Heart attack ([facility]     Heart attack ([facility] 10/18/2019    History of echocardiogram 11/14/2014    Mild LVE. EF 40%. Mild, diffuse hypk. Mild LAE.   Mild MR.      Hypercholesteremia 1/08/14    Hypertension 2000    Noncompliance with medications 2/16/2016    Obesity     Orthostatic hypotension 5/17/2016    Osteoarthritis of both knees     Pain management 04/01/2016    Dr. Sergio Morin Popliteal cyst, bilateral      Vitamin D deficiency 10/16/14       Past Surgical History:  Past Surgical History:   Procedure Laterality Date    HX HEART CATHETERIZATION      HX TUBAL LIGATION         Family History:  Family History   Problem Relation Age of Onset    Diabetes Mother     Hypertension Mother     Hypertension Father     Kidney Disease Maternal Aunt 48        Dialysis       Social History:  Social History     Tobacco Use    Smoking status: Former Smoker     Packs/day: 0.25     Years: 48.00     Pack years: 12.00     Types: Cigarettes     Last attempt to quit: 2018     Years since quittin.4    Smokeless tobacco: Never Used   Substance Use Topics    Alcohol use: No     Alcohol/week: 0.0 standard drinks    Drug use: No       Allergies:  No Known Allergies      Review of Systems       Review of Systems   Constitutional: Negative for chills and fever. Respiratory: Negative for shortness of breath. Cardiovascular: Positive for chest pain. Gastrointestinal: Negative for abdominal pain, nausea and vomiting. Musculoskeletal: Positive for back pain and myalgias. Skin: Negative for rash. Neurological: Negative for weakness. All other systems reviewed and are negative. Physical Exam     Visit Vitals  /64   Pulse 63   Temp 97 °F (36.1 °C)   Resp 18   SpO2 98%         Physical Exam  Vitals signs and nursing note reviewed. Constitutional:       General: She is not in acute distress. Appearance: Normal appearance. She is well-developed. She is not ill-appearing or diaphoretic. HENT:      Head: Normocephalic and atraumatic. Nose: Nose normal.      Mouth/Throat:      Mouth: Mucous membranes are moist.   Neck:      Musculoskeletal: Normal range of motion and neck supple. Cardiovascular:      Rate and Rhythm: Normal rate and regular rhythm. Heart sounds: Normal heart sounds. No murmur. No friction rub. No gallop. Pulmonary:      Effort: Pulmonary effort is normal. No respiratory distress. Breath sounds: Normal breath sounds. No wheezing or rales. Chest:      Chest wall: No tenderness. Abdominal:      General: Abdomen is flat. There is no distension. Palpations: Abdomen is soft. Tenderness: There is no abdominal tenderness. Musculoskeletal: Normal range of motion. Thoracic back: She exhibits no bony tenderness, no swelling and no edema. Back:       Right lower leg: No edema. Left lower leg: No edema. Skin:     General: Skin is warm. Findings: No rash. Neurological:      General: No focal deficit present. Mental Status: She is alert and oriented to person, place, and time. Diagnostic Study Results     Labs -  Recent Results (from the past 12 hour(s))   METABOLIC PANEL, BASIC    Collection Time: 10/22/20  5:33 PM   Result Value Ref Range    Sodium 138 136 - 145 mmol/L    Potassium 4.5 3.5 - 5.5 mmol/L    Chloride 107 100 - 111 mmol/L    CO2 27 21 - 32 mmol/L    Anion gap 4 3.0 - 18 mmol/L    Glucose 169 (H) 74 - 99 mg/dL    BUN 25 (H) 7.0 - 18 MG/DL    Creatinine 1.49 (H) 0.6 - 1.3 MG/DL    BUN/Creatinine ratio 17 12 - 20      GFR est AA 42 (L) >60 ml/min/1.73m2    GFR est non-AA 35 (L) >60 ml/min/1.73m2    Calcium 9.4 8.5 - 10.1 MG/DL   CBC WITH AUTOMATED DIFF    Collection Time: 10/22/20  5:33 PM   Result Value Ref Range    WBC 5.6 4.6 - 13.2 K/uL    RBC 4.14 (L) 4.20 - 5.30 M/uL    HGB 11.7 (L) 12.0 - 16.0 g/dL    HCT 37.6 35.0 - 45.0 %    MCV 90.8 74.0 - 97.0 FL    MCH 28.3 24.0 - 34.0 PG    MCHC 31.1 31.0 - 37.0 g/dL    RDW 15.7 (H) 11.6 - 14.5 %    PLATELET 051 369 - 666 K/uL    MPV 9.9 9.2 - 11.8 FL    NEUTROPHILS 51 40 - 73 %    LYMPHOCYTES 38 21 - 52 %    MONOCYTES 9 3 - 10 %    EOSINOPHILS 2 0 - 5 %    BASOPHILS 0 0 - 2 %    ABS. NEUTROPHILS 2.9 1.8 - 8.0 K/UL    ABS. LYMPHOCYTES 2.1 0.9 - 3.6 K/UL    ABS. MONOCYTES 0.5 0.05 - 1.2 K/UL    ABS. EOSINOPHILS 0.1 0.0 - 0.4 K/UL    ABS.  BASOPHILS 0.0 0.0 - 0.1 K/UL    DF AUTOMATED     CARDIAC PANEL,(CK, CKMB & TROPONIN)    Collection Time: 10/22/20  5:33 PM   Result Value Ref Range    CK - MB <1.0 <3.6 ng/ml    CK-MB Index  0.0 - 4.0 %     CALCULATION NOT PERFORMED WHEN RESULT IS BELOW LINEAR LIMIT    CK 52 26 - 192 U/L    Troponin-I, QT <0.02 0.0 - 0.045 NG/ML   TROPONIN I    Collection Time: 10/22/20  9:25 PM   Result Value Ref Range    Troponin-I, QT <0.02 0.0 - 0.045 NG/ML       Radiologic Studies -   XR CHEST PORT   Final Result   IMPRESSION:      No acute finding. CTA CHEST W OR W WO CONT    (Results Pending)   No acute aortic abnormalities, no PE. Dilated pulmonary artery. Emphysematous changes. Medical Decision Making   I am the first provider for this patient. I reviewed the vital signs, available nursing notes, past medical history, past surgical history, family history and social history. Vital Signs-Reviewed the patient's vital signs. Pulse Oximetry Analysis -  98 on room air     Cardiac Monitor:  Rate: 80  Rhythm: normal sinus rhythm    EKG: Interpreted by the EP. Time Interpreted: 1750   Rate: 72   Rhythm: normal sinus rhythm    Interpretation: no evidence of ischemia    Comparison: 10/27/2019    Records Reviewed: Nursing Notes, Old Medical Records and Previous electrocardiograms (Time of Review: 6:02 PM)  Cardiology visit on 8/13/20 reviewed as well as imaging. ED Course: Progress Notes, Reevaluation, and Consults:  10:27 PM Reviewed results with patient. Resting comfortably, no distress. Denies any chest pain or back pain. \"It only happens if I move\". Discussed need for close outpatient follow-up with cardiologist within 2 days for reassessment. Notes she is scheduled for an echocardiogram tomorrow. Discussed strict return precautions, including chest pain, shortness of breath, or any other medical concerns. Pt in agreement with plan, ready to go home. Provider Notes (Medical Decision Making): 79 yo F with hx of HTN, DM, HLD, COPD, CAD who presents to the ED due to thoracic back pain associated with chest pain x3 days. Afebrile, nontoxic-appearing, looks well. EKG without evidence of ischemia, troponin negative x2. CTA chest demonstrates no evidence of aortic abnormalities, no evidence of PE. Pain controlled in the ED. Signs and symptoms appear muscular in nature, pain is reproducible and only occurs with movement. Stable for discharge with close outpatient follow-up and strict return precautions for any new or worsening symptoms. Diagnosis     Clinical Impression:   1. Acute left-sided thoracic back pain    2. Chest pain, unspecified type    3. Hyperglycemia        Disposition: home     Follow-up Information     Follow up With Specialties Details Why [address]    [facility] EMERGENCY DEPT Emergency Medicine  If symptoms worsen [address] [facility]    Emely Arora MD Family Medicine In 2 days  [address]  [phone]             Discharge Medication List as of 10/22/2020 10:16 PM      START taking these medications    Details   acetaminophen (Tylenol Extra Strength) 500 mg tablet Take 2 Tabs by mouth every six (6) hours as needed for Pain., Normal, Disp-20 Tab,R-0      lidocaine (Lidoderm) 5 % Apply patch to the affected area for 12 hours a day and remove for 12 hours a day., Normal, Disp-1 Package,R-0         CONTINUE these medications which have NOT CHANGED    Details   sacubitriL-valsartan (Entresto)  mg tablet Take 1 Tab by mouth two (2) times a day., Normal, Disp-180 Tab,R-2      carvediloL (COREG) 25 mg tablet Take 1 Tab by mouth two (2) times daily (with meals). , Normal, Disp-180 Tab,R-3      cyanocobalamin (Vitamin B-12) 1,000 mcg tablet Take 1 Tab by mouth daily. , Normal, Disp-90 Tab,R-1      furosemide (LASIX) 40 mg tablet Take 1 Tab by mouth two (2) times daily as needed (edema). , Normal, Disp-180 Tab,R-1      atorvastatin (LIPITOR) 80 mg tablet Take 1 Tab by mouth daily. , Normal, Disp-90 Tab,R-3      pantoprazole (PROTONIX) 40 mg tablet Take 1 Tab by mouth daily. , Print, Disp-30 Tab, R-0      tizanidine HCl (TIZANIDINE PO) Take  by mouth., Historical Med      albuterol (PROVENTIL HFA, VENTOLIN HFA, PROAIR HFA) 90 mcg/actuation inhaler Take 2 Puffs by inhalation every four (4) hours as needed for Wheezing., Print, Disp-1 Inhaler, R-0 aspirin 81 mg chewable tablet Take 2 Tabs by mouth daily. , Normal, Disp-60 Tab, R-0      insulin detemir (LEVEMIR) 100 unit/mL injection 15 units daily for diabetes, Normal, Disp-2 Vial, R-0      desloratadine (CLARINEX) 5 mg tablet Take 1 Tab by mouth daily as needed for Allergies. , Program, Disp-30 Tab, R-0      mometasone (NASONEX) 50 mcg/actuation nasal spray 2 Sprays by Both Nostrils route daily as needed. For allergies, Program, Disp-3 Container, R-3      umeclidinium-vilanterol (ANORO ELLIPTA) 62.5-25 mcg/actuation inhaler Take 1 Puff by inhalation daily. For COPD, Program9/22/15 Dr Soila Graff prescribed. Will order via [facility] if availableDisp-3 Inhaler, R-3             Dictation disclaimer:  Please note that this dictation was completed with [employer], the [employer] voice recognition software. Quite often unanticipated grammatical, syntax, homophones, and other interpretive errors are inadvertently transcribed by the computer software. Please disregard these errors. Please excuse any errors that have escaped final proofreading.

## 2020-10-23 ENCOUNTER — OFFICE VISIT (OUTPATIENT)
Dept: CARDIOLOGY CLINIC | Age: 70
End: 2020-10-23
Payer: MEDICARE

## 2020-10-23 VITALS
HEART RATE: 70 BPM | OXYGEN SATURATION: 96 % | SYSTOLIC BLOOD PRESSURE: 145 MMHG | WEIGHT: 236.8 LBS | BODY MASS INDEX: 40.43 KG/M2 | HEIGHT: 64 IN | TEMPERATURE: 97.5 F | DIASTOLIC BLOOD PRESSURE: 62 MMHG

## 2020-10-23 DIAGNOSIS — I25.10 CORONARY ARTERY DISEASE INVOLVING NATIVE CORONARY ARTERY OF NATIVE HEART WITHOUT ANGINA PECTORIS: ICD-10-CM

## 2020-10-23 DIAGNOSIS — R07.2 PRECORDIAL PAIN: ICD-10-CM

## 2020-10-23 DIAGNOSIS — I50.42 CHRONIC COMBINED SYSTOLIC AND DIASTOLIC CONGESTIVE HEART FAILURE (HCC): Primary | ICD-10-CM

## 2020-10-23 DIAGNOSIS — I34.0 NONRHEUMATIC MITRAL VALVE REGURGITATION: ICD-10-CM

## 2020-10-23 DIAGNOSIS — E78.5 DYSLIPIDEMIA, GOAL LDL BELOW 70: ICD-10-CM

## 2020-10-23 DIAGNOSIS — E66.01 SEVERE OBESITY (BMI 35.0-39.9) WITH COMORBIDITY (HCC): ICD-10-CM

## 2020-10-23 DIAGNOSIS — I25.5 ISCHEMIC CARDIOMYOPATHY: ICD-10-CM

## 2020-10-23 LAB
ATRIAL RATE: 72 BPM
CALCULATED P AXIS, ECG09: 85 DEGREES
CALCULATED R AXIS, ECG10: 106 DEGREES
CALCULATED T AXIS, ECG11: 81 DEGREES
DIAGNOSIS, 93000: NORMAL
P-R INTERVAL, ECG05: 180 MS
Q-T INTERVAL, ECG07: 408 MS
QRS DURATION, ECG06: 76 MS
QTC CALCULATION (BEZET), ECG08: 446 MS
VENTRICULAR RATE, ECG03: 72 BPM

## 2020-10-23 PROCEDURE — G8417 CALC BMI ABV UP PARAM F/U: HCPCS | Performed by: INTERNAL MEDICINE

## 2020-10-23 PROCEDURE — G8536 NO DOC ELDER MAL SCRN: HCPCS | Performed by: INTERNAL MEDICINE

## 2020-10-23 PROCEDURE — G8753 SYS BP > OR = 140: HCPCS | Performed by: INTERNAL MEDICINE

## 2020-10-23 PROCEDURE — 3288F FALL RISK ASSESSMENT DOCD: CPT | Performed by: INTERNAL MEDICINE

## 2020-10-23 PROCEDURE — G8432 DEP SCR NOT DOC, RNG: HCPCS | Performed by: INTERNAL MEDICINE

## 2020-10-23 PROCEDURE — G8427 DOCREV CUR MEDS BY ELIG CLIN: HCPCS | Performed by: INTERNAL MEDICINE

## 2020-10-23 PROCEDURE — G8754 DIAS BP LESS 90: HCPCS | Performed by: INTERNAL MEDICINE

## 2020-10-23 PROCEDURE — 3017F COLORECTAL CA SCREEN DOC REV: CPT | Performed by: INTERNAL MEDICINE

## 2020-10-23 PROCEDURE — 1100F PTFALLS ASSESS-DOCD GE2>/YR: CPT | Performed by: INTERNAL MEDICINE

## 2020-10-23 PROCEDURE — G8399 PT W/DXA RESULTS DOCUMENT: HCPCS | Performed by: INTERNAL MEDICINE

## 2020-10-23 PROCEDURE — 1090F PRES/ABSN URINE INCON ASSESS: CPT | Performed by: INTERNAL MEDICINE

## 2020-10-23 PROCEDURE — G9899 SCRN MAM PERF RSLTS DOC: HCPCS | Performed by: INTERNAL MEDICINE

## 2020-10-23 PROCEDURE — 99214 OFFICE O/P EST MOD 30 MIN: CPT | Performed by: INTERNAL MEDICINE

## 2020-10-23 RX ORDER — TRAMADOL HYDROCHLORIDE 50 MG/1
50 TABLET ORAL
Qty: 15 TAB | Refills: 0 | Status: SHIPPED | OUTPATIENT
Start: 2020-10-23 | End: 2020-10-28

## 2020-10-23 RX ORDER — TIZANIDINE 4 MG/1
4 TABLET ORAL
COMMUNITY
End: 2022-03-11

## 2020-10-23 NOTE — PATIENT INSTRUCTIONS
Medications Discontinued During This Encounter   Medication Reason    cyanocobalamin (Vitamin B-12) 1,000 mcg tablet     cyclobenzaprine (FLEXERIL) 5 mg tablet           Chest Pain: Care Instructions  Your Care Instructions     There are many things that can cause chest pain. Some are not serious and will get better on their own in a few days. But some kinds of chest pain need more testing and treatment. Your doctor may have recommended a follow-up visit in the next 8 to 12 hours. If you are not getting better, you may need more tests or treatment. Even though your doctor has released you, you still need to watch for any problems. The doctor carefully checked you, but sometimes problems can develop later. If you have new symptoms or if your symptoms do not get better, get medical care right away. If you have worse or different chest pain or pressure that lasts more than 5 minutes or you passed out (lost consciousness), call 911 or seek other emergency help right away. A medical visit is only one step in your treatment. Even if you feel better, you still need to do what your doctor recommends, such as going to all suggested follow-up appointments and taking medicines exactly as directed. This will help you recover and help prevent future problems. How can you care for yourself at home? · Rest until you feel better. · Take your medicine exactly as prescribed. Call your doctor if you think you are having a problem with your medicine. · Do not drive after taking a prescription pain medicine. When should you call for help? Call 911 if:     · You passed out (lost consciousness).     · You have severe difficulty breathing.     · You have symptoms of a heart attack. These may include:  ? Chest pain or pressure, or a strange feeling in your chest.  ? Sweating. ? Shortness of breath. ? Nausea or vomiting.   ? Pain, pressure, or a strange feeling in your back, neck, jaw, or upper belly or in one or both shoulders or arms. ? Lightheadedness or sudden weakness. ? A fast or irregular heartbeat. After you call 911, the  may tell you to chew 1 adult-strength or 2 to 4 low-dose aspirin. Wait for an ambulance. Do not try to drive yourself. Call your doctor today if:     · You have any trouble breathing.     · Your chest pain gets worse.     · You are dizzy or lightheaded, or you feel like you may faint.     · You are not getting better as expected.     · You are having new or different chest pain. Where can you learn more? Go to http://www.alonso.com/  Enter A120 in the search box to learn more about \"Chest Pain: Care Instructions. \"  Current as of: June 26, 2019               Content Version: 12.6  © 9422-6815 [employer]. Care instructions adapted under license by [employer] (which disclaims liability or warranty for this information). If you have questions about a medical condition or this instruction, always ask your healthcare professional. [place] [address] any warranty or liability for your use of this information.

## 2020-10-23 NOTE — ED NOTES
Assumed care of pt  Pt ambulated from triage with strong steady gait  Introduced self to pt  Pt alert and oriented x 4  C/o chest pain x 2 days   Pt has hx of hypertension   Pt denied SOB   C/o constant pain in chest  Pt denied radiating pain   Pt placed on monitor SR   Updated about plan of care   Will continue to monitor and assess

## 2020-10-23 NOTE — PROGRESS NOTES
HISTORY OF PRESENT ILLNESS  Ian Ram is a 78 y.o. female. History of anterior wall MI complicated by cardiac arrest due to acute stent closure requiring repeat PCI to her LAD with 2 YADIRA 5/2018        Cardiomyopathy   The history is provided by the patient and medical records. This is a chronic problem. The current episode started more than 1 week ago. The problem occurs constantly. The problem has not changed since onset. Associated symptoms include shortness of breath. Pertinent negatives include no chest pain and no headaches. The symptoms are aggravated by exertion. The symptoms are relieved by rest.   Hypertension   The history is provided by the patient and medical records. This is a chronic problem. The current episode started more than 1 week ago. Associated symptoms include shortness of breath. Pertinent negatives include no chest pain and no headaches. CHF   The history is provided by the medical records. This is a chronic problem. Associated symptoms include shortness of breath. Pertinent negatives include no chest pain and no headaches. Cholesterol Problem   The history is provided by the medical records. This is a chronic problem. Associated symptoms include shortness of breath. Pertinent negatives include no chest pain and no headaches. Shortness of Breath   The history is provided by the patient. This is a chronic problem. The problem occurs intermittently. The current episode started more than 1 week ago. The problem has not changed since onset. Associated symptoms include leg swelling. Pertinent negatives include no fever, no headaches, no cough, no wheezing, no PND, no orthopnea, no chest pain, no vomiting, no rash and no claudication. The problem's precipitants include exercise (1 flight; 10/19 50 ft; 2/20 steps; 8/20 house chores; ). Leg Swelling   The history is provided by the patient. This is a recurrent problem. The current episode started more than 1 week ago.  The problem occurs daily. The problem has been gradually improving. Associated symptoms include shortness of breath. Pertinent negatives include no chest pain and no headaches. The symptoms are aggravated by standing. The symptoms are relieved by sleep and medications. ED Follow-up   The history is provided by the patient and medical records (CP). Associated symptoms include shortness of breath. Pertinent negatives include no chest pain and no headaches. Valvular Heart Disease   Associated symptoms include shortness of breath. Pertinent negatives include no chest pain and no headaches. Chest Pain (Angina)    The history is provided by the patient. This is a new problem. The current episode started 2 days ago. The problem has not changed since onset. The problem occurs constantly. The pain is associated with movement. The pain is present in the lateral region and left side. The quality of the pain is described as sharp. The pain radiates to the left shoulder. Associated symptoms include lower extremity edema and shortness of breath. Pertinent negatives include no claudication, no cough, no diaphoresis, no dizziness, no fever, no headaches, no malaise/fatigue, no nausea, no orthopnea, no palpitations, no PND and no vomiting. Review of Systems   Constitutional: Negative for chills, diaphoresis, fever, malaise/fatigue and weight loss. HENT: Negative for nosebleeds. Eyes: Negative for discharge. Respiratory: Positive for shortness of breath. Negative for cough and wheezing. Cardiovascular: Positive for leg swelling. Negative for chest pain, palpitations, orthopnea, claudication and PND. Gastrointestinal: Negative for diarrhea, nausea and vomiting. Genitourinary: Negative for dysuria and hematuria. Musculoskeletal: Negative for falls and joint pain. Skin: Negative for rash. Neurological: Negative for dizziness, seizures, loss of consciousness and headaches. Endo/Heme/Allergies: Negative for polydipsia.  Does not bruise/bleed easily. Psychiatric/Behavioral: Negative for depression and substance abuse. The patient does not have insomnia. No Known Allergies    Past Medical History:   Diagnosis Date    Abnormal WBC count 2016    Anemia     Bilateral shoulder pain 2016    Chondromalacia of both patellae     COPD (chronic obstructive pulmonary disease) ([facility] [address] 2015    mild-mod dz; Dr Berry Necessary    Diabetes [facility])     Diabetic eye exam ([facility] [address]     Dilated cardiomyopathy ([facility] [address]     Dyslipidemia     Gout     Heart attack ([facility] [address]     Heart attack ([facility] [address]     Heart attack ([facility] [address] 10/18/2019    History of echocardiogram 2014    Mild LVE. EF 40%. Mild, diffuse hypk. Mild LAE. Mild MR.      Hypercholesteremia 14    Hypertension 2000    Noncompliance with medications 2016    Obesity     Orthostatic hypotension 2016    Osteoarthritis of both knees     Pain management 2016    Dr. Noonan Ripa Popliteal cyst, bilateral      Vitamin D deficiency 10/16/14       Family History   Problem Relation Age of Onset    Diabetes Mother     Hypertension Mother     Hypertension Father     Kidney Disease Maternal Aunt 48        Dialysis       Social History     Tobacco Use    Smoking status: Former Smoker     Packs/day: 0.25     Years: 48.00     Pack years: 12.00     Types: Cigarettes     Last attempt to quit: 2018     Years since quittin.4    Smokeless tobacco: Never Used   Substance Use Topics    Alcohol use: No     Alcohol/week: 0.0 standard drinks    Drug use: No        Current Outpatient Medications   Medication Sig    tiZANidine (ZANAFLEX) 4 mg tablet Take 4 mg by mouth two (2) times a day.  acetaminophen (Tylenol Extra Strength) 500 mg tablet Take 2 Tabs by mouth every six (6) hours as needed for Pain.  lidocaine (Lidoderm) 5 % Apply patch to the affected area for 12 hours a day and remove for 12 hours a day.     sacubitriL-valsartan (Entresto)  mg tablet Take 1 Tab by mouth two (2) times a day.  carvediloL (COREG) 25 mg tablet Take 1 Tab by mouth two (2) times daily (with meals).  furosemide (LASIX) 40 mg tablet Take 1 Tab by mouth two (2) times daily as needed (edema).  atorvastatin (LIPITOR) 80 mg tablet Take 1 Tab by mouth daily.  pantoprazole (PROTONIX) 40 mg tablet Take 1 Tab by mouth daily.  albuterol (PROVENTIL HFA, VENTOLIN HFA, PROAIR HFA) 90 mcg/actuation inhaler Take 2 Puffs by inhalation every four (4) hours as needed for Wheezing.  aspirin 81 mg chewable tablet Take 2 Tabs by mouth daily.  insulin detemir (LEVEMIR) 100 unit/mL injection 15 units daily for diabetes    desloratadine (CLARINEX) 5 mg tablet Take 1 Tab by mouth daily as needed for Allergies.  mometasone (NASONEX) 50 mcg/actuation nasal spray 2 Sprays by Both Nostrils route daily as needed. For allergies    umeclidinium-vilanterol (ANORO ELLIPTA) 62.5-25 mcg/actuation inhaler Take 1 Puff by inhalation daily. For COPD     No current facility-administered medications for this visit. Past Surgical History:   Procedure Laterality Date    HX HEART CATHETERIZATION      HX TUBAL LIGATION         Visit Vitals  BP (!) 145/62 (BP 1 Location: Left arm, BP Patient Position: Sitting)   Pulse 70   Temp 97.5 °F (36.4 °C) (Temporal)   Ht 5' 4\" (1.626 m)   Wt 107.4 kg (236 lb 12.8 oz)   SpO2 96%   BMI 40.65 kg/m²       Diagnostic Studies:  I have reviewed the relevant tests done on the patient and show as follows  EKG tracings reviewed by me today.     EKG Results     None        XR Results (most recent):  Results from Hospital Encounter encounter on 10/22/20   XR CHEST PORT    Narrative EXAM: CHEST ONE VIEW  portable 1733 hours    CLINICAL HISTORY/INDICATION: Left-sided Chest pain associated with left scapular  pain x3 days, pain is described as sharp, constant, and worse with movement, the  scapular pain radiates into the left lower back, associated muscle spasms and  tightness    COMPARISON: Chest x-ray October 27, 2019. TECHNIQUE: One view obtained. FINDINGS:     The cardiac and mediastinal silhouette is normal. The lungs are clear. Pulmonary  vascularity is normal. The costophrenic angles are sharply defined. Mild disc  space narrowing with marginal spurring involves the mid and lower thoracic  spine. .      Impression IMPRESSION:    No acute finding. 08/07/19   ECHO ADULT COMPLETE 08/07/2019 8/7/2019    Narrative · Left Ventricle: Normal cavity size and wall thickness. Mild systolic   dysfunction. Estimated left ventricular ejection fraction is 46 - 50%. Abnormal left ventricular wall motion. Moderate (grade 2) left ventricular   diastolic dysfunction. · Left Atrium: Moderately dilated left atrium. · Aortic Valve: Aortic valve sclerosis. · Mitral Valve: Mitral valve thickening. Moderate to severe mitral valve   regurgitation. · Tricuspid Valve: Mild tricuspid valve regurgitation is present. Pulmonary arterial systolic pressure is 78.3 mmHg. Moderate pulmonary   hypertension. · Pulmonic Valve: Mild pulmonic valve regurgitation is present. · IVC/Hepatic Veins: Severely elevated central venous pressure (15+ mmHg);   IVC diameter is larger than 21 mm and collapses less than 50% with   respiration. Signed by: Lorenza León MD       08/25/19   NUCLEAR CARDIAC STRESS TEST 08/28/2019 8/28/2019    Narrative · Baseline ECG: Normal sinus rhythm, occasional PACs. · Gated SPECT: Left ventricular function post-stress was abnormal.   Calculated ejection fraction is 44%. The TID ratio is 0.94. · Negative stress test.  · Left ventricular perfusion is abnormal.  · Myocardial perfusion imaging defect 1: There is a defect that is   moderate in size with a severe reduction in uptake present in the   mid-apical anterior and apex location(s) that is non-reversible. There is   abnormal wall motion in the defect area. Viability in the area is poor.    The defect appears to be infarction. Perfusion defect was visually present   without quantitative evidence. · Positive myocardial perfusion imaging. Myocardial perfusion imaging   supports an intermediate risk stress test.        Signed by: Moriah Aranda MD        10/18/19   ECHO ADULT FOLLOW-UP OR LIMITED 10/21/2019 10/21/2019    Narrative · Left Ventricle: Mildly dilated left ventricle. Mild concentric   hypertrophy. Mild-to-moderate systolic dysfunction. Calculated left   ventricular ejection fraction is 46%. Biplane method used to measure   ejection fraction. · Mitral Valve: Mitral valve thickening. Mitral annular calcification. Moderate mitral valve regurgitation is present. The mitral regurgitant jet   is eccentric. · Interatrial Septum: Agitated saline contrast study was performed. No   obvious septal defect. Signed by: Armand Alvarado MD     10/18/19   CARDIAC PROCEDURE 10/25/2019 10/25/2019    Narrative Patent stent in proximal LAD. Mildly reduced LV function. Continue intense risk factor modification. Signed by: Armand Alvarado MD         Ms. Stacy Galvan has a reminder for a \"due or due soon\" health maintenance. I have asked that she contact her primary care provider for follow-up on this health maintenance. Physical Exam   Constitutional: She is oriented to person, place, and time. She appears well-developed and well-nourished. Neck: No JVD present. Cardiovascular: Normal rate, regular rhythm, normal heart sounds and intact distal pulses. Exam reveals no gallop and no friction rub. No murmur heard. Pulmonary/Chest: Effort normal and breath sounds normal. No respiratory distress. She has no wheezes. She has no rales. She exhibits no tenderness. Abdominal: Soft. She exhibits no distension and no mass. There is no abdominal tenderness. Musculoskeletal: Normal range of motion. General: No edema.    Neurological: She is alert and oriented to person, place, and time.   Skin: Skin is warm and dry. Psychiatric: She has a normal mood and affect. ASSESSMENT and PLAN      HLD : Results for Spring Guerrero (MRN [id number]) as of 10/11/2019 12:54   Ref. Range 9/10/2019 00:00 9/30/2019 17:00 10/10/2019 00:00   Triglyceride Latest Ref Range: 0 - 149 mg/dL 67  101   Cholesterol, total Latest Ref Range: 100 - 199 mg/dL 168  152   HDL Cholesterol Latest Ref Range: >39 mg/dL 55  46   LDL, calculated Latest Ref Range: 0 - 99 mg/dL 100 (H)  86   VLDL, calculated Latest Ref Range: 5 - 40 mg/dL 13  20     Mitral regurgitation: 10/19 moderate;    CHF is improving well. Advised patient to skip Lasix if there is no swelling. NYHA class II  Chest pain is musculoskeletal and tramadol prescription was given. Follow-up electrolytes periodically. Lipids to be checked again as they were not done in May when she had other labs. She will try to lose weight. Diet weight and exercise discussed and she is trying to follow. Diagnoses and all orders for this visit:    1. Chronic combined systolic and diastolic congestive heart failure ([address]    2. Coronary artery disease involving native coronary artery of native heart without angina pectoris    3. Nonrheumatic mitral valve regurgitation    4. Ischemic cardiomyopathy    5. Dyslipidemia, goal LDL below 70    6. Severe obesity (BMI 35.0-39. 9) with comorbidity ([address]    7. Precordial pain  -     traMADoL (ULTRAM) 50 mg tablet; Take 1 Tab by mouth three (3) times daily as needed for Pain for up to 5 days. Max Daily Amount: 150 mg.        Pertinent laboratory and test data reviewed and discussed with patient.   See patient instructions also for other medical advice given    Medications Discontinued During This Encounter   Medication Reason    cyanocobalamin (Vitamin B-12) 1,000 mcg tablet     cyclobenzaprine (FLEXERIL) 5 mg tablet        Follow-up and Dispositions    · Return in about 3 months (around 1/23/2021), or if symptoms worsen or fail to improve.

## 2020-10-23 NOTE — ED NOTES
Introduced self to patient  Pt laying in bed non verbal   No noted signs of distress  Verified pt using 2 identifiers  Explained use and side effects  Medicated as ordered  No swelling at site  No signs of pain   Flushed well  Pt positioned in bed for comfort  Updated about plan of care   Will continue to monitor and assess

## 2020-10-23 NOTE — DISCHARGE INSTRUCTIONS
Patient Education      Take medication as prescribed. Follow-up with your primary care physician and cardiologist within 2 days for reassessment. Bring the results from this visit with you for their review. Return to the ED immediately for any new, worsening, or persistent symptoms, including shortness of breath, vomiting, or any other medical concerns. Learning About Relief for Back Pain  What is back tension and strain? Back strain happens when you overstretch, or pull, a muscle in your back. You may hurt your back in an accident or when you exercise or lift something. Most back pain will get better with rest and time. You can take care of yourself at home to help your back heal.  What can you do first to relieve back pain? When you first feel back pain, try these steps:  · Walk. Take a short walk (10 to 20 minutes) on a level surface (no slopes, hills, or stairs) every 2 to 3 hours. Walk only distances you can manage without pain, especially leg pain. · Relax. Find a comfortable position for rest. Some people are comfortable on the floor or a medium-firm bed with a small pillow under their head and another under their knees. Some people prefer to lie on their side with a pillow between their knees. Don't stay in one position for too long. · Try heat or ice. Try using a heating pad on a low or medium setting, or take a warm shower, for 15 to 20 minutes every 2 to 3 hours. Or you can buy single-use heat wraps that last up to 8 hours. You can also try an ice pack for 10 to 15 minutes every 2 to 3 hours. You can use an ice pack or a bag of frozen vegetables wrapped in a thin towel. There is not strong evidence that either heat or ice will help, but you can try them to see if they help. You may also want to try switching between heat and cold. · Take pain medicine exactly as directed. ¨ If the doctor gave you a prescription medicine for pain, take it as prescribed.   ¨ If you are not taking a prescription pain medicine, ask your doctor if you can take an over-the-counter medicine. What else can you do? · Stretch and exercise. Exercises that increase flexibility may relieve your pain and make it easier for your muscles to keep your spine in a good, neutral position. And don't forget to keep walking. · Do self-massage. You can use self-massage to unwind after work or school or to energize yourself in the morning. You can easily massage your feet, hands, or neck. Self-massage works best if you are in comfortable clothes and are sitting or lying in a comfortable position. Use oil or lotion to massage bare skin. · Reduce stress. Back pain can lead to a vicious [place]: Distress about the pain tenses the muscles in your back, which in turn causes more pain. Learn how to relax your mind and your muscles to lower your stress. Where can you learn more? Go to http://www.gray.com/. Enter P807 in the search box to learn more about \"Learning About Relief for Back Pain. \"  Current as of: March 21, 2017  Content Version: 11.5  © 5583-7042 [employer]. Care instructions adapted under license by [employer] (which disclaims liability or warranty for this information). If you have questions about a medical condition or this instruction, always ask your healthcare professional. [place] [address] any warranty or liability for your use of this information. Patient Education        Chest Pain: Care Instructions  Your Care Instructions     There are many things that can cause chest pain. Some are not serious and will get better on their own in a few days. But some kinds of chest pain need more testing and treatment. Your doctor may have recommended a follow-up visit in the next 8 to 12 hours. If you are not getting better, you may need more tests or treatment. Even though your doctor has released you, you still need to watch for any problems.  The doctor carefully checked you, but sometimes problems can develop later. If you have new symptoms or if your symptoms do not get better, get medical care right away. If you have worse or different chest pain or pressure that lasts more than 5 minutes or you passed out (lost consciousness), call 911 or seek other emergency help right away. A medical visit is only one step in your treatment. Even if you feel better, you still need to do what your doctor recommends, such as going to all suggested follow-up appointments and taking medicines exactly as directed. This will help you recover and help prevent future problems. How can you care for yourself at home? · Rest until you feel better. · Take your medicine exactly as prescribed. Call your doctor if you think you are having a problem with your medicine. · Do not drive after taking a prescription pain medicine. When should you call for help? Call 911 if:     · You passed out (lost consciousness).     · You have severe difficulty breathing.     · You have symptoms of a heart attack. These may include:  ? Chest pain or pressure, or a strange feeling in your chest.  ? Sweating. ? Shortness of breath. ? Nausea or vomiting. ? Pain, pressure, or a strange feeling in your back, neck, jaw, or upper belly or in one or both shoulders or arms. ? Lightheadedness or sudden weakness. ? A fast or irregular heartbeat. After you call 911, the  may tell you to chew 1 adult-strength or 2 to 4 low-dose aspirin. Wait for an ambulance. Do not try to drive yourself. Call your doctor today if:     · You have any trouble breathing.     · Your chest pain gets worse.     · You are dizzy or lightheaded, or you feel like you may faint.     · You are not getting better as expected.     · You are having new or different chest pain. Where can you learn more?   Go to http://www.gray.com/  Enter A120 in the search box to learn more about \"Chest Pain: Care Instructions. \"  Current as of: June 26, 2019               Content Version: 12.6  © 2661-4832 [employer]. Care instructions adapted under license by [employer] (which disclaims liability or warranty for this information). If you have questions about a medical condition or this instruction, always ask your healthcare professional. [address] any warranty or liability for your use of this information. Patient Education        Back Stretches: Exercises  Introduction  Here are some examples of exercises for stretching your back. Start each exercise slowly. Ease off the exercise if you start to have pain. Your doctor or physical therapist will tell you when you can start these exercises and which ones will work best for you. How to do the exercises  Overhead stretch   1. Stand comfortably with your feet shoulder-width apart. 2. Looking straight ahead, raise both arms over your head and reach toward the ceiling. Do not allow your head to tilt back. 3. Hold for 15 to 30 seconds, then lower your arms to your sides. 4. Repeat 2 to 4 times. Side stretch   1. Stand comfortably with your feet shoulder-width apart. 2. Raise one arm over your head, and then lean to the other side. 3. Slide your hand down your leg as you let the weight of your arm gently stretch your side muscles. Hold for 15 to 30 seconds. 4. Repeat 2 to 4 times on each side. Press-up   1. Lie on your stomach, supporting your body with your forearms. 2. Press your elbows down into the floor to raise your upper back. As you do this, relax your stomach muscles and allow your back to arch without using your back muscles. As your press up, do not let your hips or pelvis come off the floor. 3. Hold for 15 to 30 seconds, then relax. 4. Repeat 2 to 4 times. Relax and rest   1. Lie on your back with a rolled towel under your neck and a pillow under your knees.  Extend your arms comfortably to your sides. 2. Relax and breathe normally. 3. Remain in this position for about 10 minutes. 4. If you can, do this 2 or 3 times each day. Follow-up care is a key part of your treatment and safety. Be sure to make and go to all appointments, and call your doctor if you are having problems. It's also a good idea to know your test results and keep a list of the medicines you take. Where can you learn more? Go to http://www.alonso.com/  Enter Y090 in the search box to learn more about \"Back Stretches: Exercises. \"  Current as of: March 2, 2020               Content Version: 12.6  © 8030-9399 [employer]. Care instructions adapted under license by [employer] (which disclaims liability or warranty for this information). If you have questions about a medical condition or this instruction, always ask your healthcare professional. [address] any warranty or liability for your use of this information.

## 2020-10-23 NOTE — ED NOTES
Pt assessment upon discharge was stable  Pt discharged home  Education was completed   Verbal instruction was given   Pt understood and read back   Pt discharged with self  IV dc'd and bandaged

## 2020-11-04 ENCOUNTER — HOSPITAL ENCOUNTER (EMERGENCY)
Age: 70
Discharge: HOME | End: 2020-11-04
Attending: EMERGENCY MEDICINE | Admitting: EMERGENCY MEDICINE
Payer: MEDICARE

## 2020-11-04 VITALS
WEIGHT: 220 LBS | SYSTOLIC BLOOD PRESSURE: 110 MMHG | BODY MASS INDEX: 37.56 KG/M2 | HEIGHT: 64 IN | HEART RATE: 59 BPM | TEMPERATURE: 98 F | RESPIRATION RATE: 20 BRPM | OXYGEN SATURATION: 99 % | DIASTOLIC BLOOD PRESSURE: 55 MMHG

## 2020-11-04 DIAGNOSIS — N17.9 ACUTE RENAL FAILURE, UNSPECIFIED ACUTE RENAL FAILURE TYPE (HCC): ICD-10-CM

## 2020-11-04 DIAGNOSIS — M79.10 MYALGIA: Primary | ICD-10-CM

## 2020-11-04 DIAGNOSIS — M54.2 CERVICAL PAIN: ICD-10-CM

## 2020-11-04 DIAGNOSIS — R73.9 HYPERGLYCEMIA: ICD-10-CM

## 2020-11-04 LAB
ALBUMIN SERPL-MCNC: 3.2 G/DL (ref 3.4–5)
ALBUMIN/GLOB SERPL: 0.9 {RATIO} (ref 0.8–1.7)
ALP SERPL-CCNC: 141 U/L (ref 45–117)
ALT SERPL-CCNC: 12 U/L (ref 13–56)
ANION GAP SERPL CALC-SCNC: 9 MMOL/L (ref 3–18)
AST SERPL-CCNC: 11 U/L (ref 10–38)
BASOPHILS # BLD: 0 K/UL (ref 0–0.1)
BASOPHILS NFR BLD: 0 % (ref 0–2)
BILIRUB DIRECT SERPL-MCNC: 0.2 MG/DL (ref 0–0.2)
BILIRUB SERPL-MCNC: 0.7 MG/DL (ref 0.2–1)
BUN SERPL-MCNC: 30 MG/DL (ref 7–18)
BUN/CREAT SERPL: 13 (ref 12–20)
CALCIUM SERPL-MCNC: 9 MG/DL (ref 8.5–10.1)
CHLORIDE SERPL-SCNC: 102 MMOL/L (ref 100–111)
CK MB CFR SERPL CALC: NORMAL % (ref 0–4)
CK MB SERPL-MCNC: <1 NG/ML (ref 5–25)
CK SERPL-CCNC: 46 U/L (ref 26–192)
CO2 SERPL-SCNC: 26 MMOL/L (ref 21–32)
CREAT SERPL-MCNC: 2.28 MG/DL (ref 0.6–1.3)
DIFFERENTIAL METHOD BLD: ABNORMAL
EOSINOPHIL # BLD: 0.1 K/UL (ref 0–0.4)
EOSINOPHIL NFR BLD: 1 % (ref 0–5)
ERYTHROCYTE [DISTWIDTH] IN BLOOD BY AUTOMATED COUNT: 14.8 % (ref 11.6–14.5)
GLOBULIN SER CALC-MCNC: 3.7 G/DL (ref 2–4)
GLUCOSE SERPL-MCNC: 187 MG/DL (ref 74–99)
HCT VFR BLD AUTO: 37.2 % (ref 35–45)
HGB BLD-MCNC: 11.3 G/DL (ref 12–16)
LIPASE SERPL-CCNC: 363 U/L (ref 73–393)
LYMPHOCYTES # BLD: 1.5 K/UL (ref 0.9–3.6)
LYMPHOCYTES NFR BLD: 27 % (ref 21–52)
MCH RBC QN AUTO: 28 PG (ref 24–34)
MCHC RBC AUTO-ENTMCNC: 30.4 G/DL (ref 31–37)
MCV RBC AUTO: 92.3 FL (ref 74–97)
MONOCYTES # BLD: 0.5 K/UL (ref 0.05–1.2)
MONOCYTES NFR BLD: 9 % (ref 3–10)
NEUTS SEG # BLD: 3.5 K/UL (ref 1.8–8)
NEUTS SEG NFR BLD: 63 % (ref 40–73)
PLATELET # BLD AUTO: 311 K/UL (ref 135–420)
PMV BLD AUTO: 9 FL (ref 9.2–11.8)
POTASSIUM SERPL-SCNC: 4.5 MMOL/L (ref 3.5–5.5)
PROT SERPL-MCNC: 6.9 G/DL (ref 6.4–8.2)
RBC # BLD AUTO: 4.03 M/UL (ref 4.2–5.3)
SODIUM SERPL-SCNC: 137 MMOL/L (ref 136–145)
TROPONIN I SERPL-MCNC: <0.02 NG/ML (ref 0–0.04)
WBC # BLD AUTO: 5.6 K/UL (ref 4.6–13.2)

## 2020-11-04 PROCEDURE — 99284 EMERGENCY DEPT VISIT MOD MDM: CPT

## 2020-11-04 PROCEDURE — 82550 ASSAY OF CK (CPK): CPT

## 2020-11-04 PROCEDURE — 83690 ASSAY OF LIPASE: CPT

## 2020-11-04 PROCEDURE — 93005 ELECTROCARDIOGRAM TRACING: CPT

## 2020-11-04 PROCEDURE — 80048 BASIC METABOLIC PNL TOTAL CA: CPT

## 2020-11-04 PROCEDURE — 80076 HEPATIC FUNCTION PANEL: CPT

## 2020-11-04 PROCEDURE — 85025 COMPLETE CBC W/AUTO DIFF WBC: CPT

## 2020-11-04 PROCEDURE — 74011250637 HC RX REV CODE- 250/637: Performed by: EMERGENCY MEDICINE

## 2020-11-04 RX ORDER — OXYCODONE AND ACETAMINOPHEN 5; 325 MG/1; MG/1
1 TABLET ORAL
Status: COMPLETED | OUTPATIENT
Start: 2020-11-04 | End: 2020-11-04

## 2020-11-04 RX ORDER — OXYCODONE AND ACETAMINOPHEN 5; 325 MG/1; MG/1
1 TABLET ORAL
Qty: 12 TAB | Refills: 0 | Status: SHIPPED | OUTPATIENT
Start: 2020-11-04 | End: 2020-11-11

## 2020-11-04 RX ADMIN — OXYCODONE HYDROCHLORIDE AND ACETAMINOPHEN 1 TABLET: 5; 325 TABLET ORAL at 19:49

## 2020-11-05 LAB
ATRIAL RATE: 72 BPM
CALCULATED P AXIS, ECG09: 49 DEGREES
CALCULATED R AXIS, ECG10: 15 DEGREES
CALCULATED T AXIS, ECG11: 89 DEGREES
DIAGNOSIS, 93000: NORMAL
P-R INTERVAL, ECG05: 180 MS
Q-T INTERVAL, ECG07: 426 MS
QRS DURATION, ECG06: 82 MS
QTC CALCULATION (BEZET), ECG08: 466 MS
VENTRICULAR RATE, ECG03: 72 BPM

## 2020-11-05 NOTE — DISCHARGE INSTRUCTIONS
Return for pain, swelling, fever not resolving with motrin or tylenol, shortness of breath, vomiting, decreased fluid intake, weakness, numbness, dizziness, or any change or concerns. You need close follow up and labs rechecked as we discussed. Patient Education        Back Pain: Care Instructions  Your Care Instructions     Back pain has many possible causes. It is often related to problems with muscles and ligaments of the back. It may also be related to problems with the nerves, discs, or bones of the back. Moving, lifting, standing, sitting, or sleeping in an awkward way can strain the back. Sometimes you don't notice the injury until later. Arthritis is another common cause of back pain. Although it may hurt a lot, back pain usually improves on its own within several weeks. Most people recover in 12 weeks or less. Using good home treatment and being careful not to stress your back can help you feel better sooner. Follow-up care is a key part of your treatment and safety. Be sure to make and go to all appointments, and call your doctor if you are having problems. It's also a good idea to know your test results and keep a list of the medicines you take. How can you care for yourself at home? · Sit or lie in positions that are most comfortable and reduce your pain. Try one of these positions when you lie down:  ? Lie on your back with your knees bent and supported by large pillows. ? Lie on the floor with your legs on the seat of a sofa or chair. ? Lie on your side with your knees and hips bent and a pillow between your legs. ? Lie on your stomach if it does not make pain worse. · Do not sit up in bed, and avoid soft couches and twisted positions. Bed rest can help relieve pain at first, but it delays healing. Avoid bed rest after the first day of back pain. · Change positions every 30 minutes. If you must sit for long periods of time, take breaks from sitting.  Get up and walk around, or lie in a comfortable position. · Try using a heating pad on a low or medium setting for 15 to 20 minutes every 2 or 3 hours. Try a warm shower in place of one session with the heating pad. · You can also try an ice pack for 10 to 15 minutes every 2 to 3 hours. Put a thin cloth between the ice pack and your skin. · Take pain medicines exactly as directed. ? If the doctor gave you a prescription medicine for pain, take it as prescribed. ? If you are not taking a prescription pain medicine, ask your doctor if you can take an over-the-counter medicine. · Take short walks several times a day. You can start with 5 to 10 minutes, 3 or 4 times a day, and work up to longer walks. Walk on level surfaces and avoid hills and stairs until your back is better. · Return to work and other activities as soon as you can. Continued rest without activity is usually not good for your back. · To prevent future back pain, do exercises to stretch and strengthen your back and stomach. Learn how to use good posture, safe lifting techniques, and proper body mechanics. When should you call for help? Call your doctor now or seek immediate medical care if:    · You have new or worsening numbness in your legs.     · You have new or worsening weakness in your legs. (This could make it hard to stand up.)     · You lose control of your bladder or bowels. Watch closely for changes in your health, and be sure to contact your doctor if:    · You have a fever, lose weight, or don't feel well.     · You do not get better as expected. Where can you learn more? Go to http://www.alonso.com/  Enter I594 in the search box to learn more about \"Back Pain: Care Instructions. \"  Current as of: March 2, 2020               Content Version: 12.6  © 2645-4364 [employer], Incorporated. Care instructions adapted under license by [employer] (which disclaims liability or warranty for this information).  If you have questions about a medical condition or this instruction, always ask your healthcare professional. [address] any warranty or liability for your use of this information. Patient Education        Neck Pain: Care Instructions  Your Care Instructions    You can have neck pain anywhere from the bottom of your head to the top of your shoulders. It can spread to the upper back or arms. Injuries, painting a ceiling, sleeping with your neck twisted, staying in one position for too long, and many other activities can cause neck pain. Most neck pain gets better with home care. Your doctor may recommend medicine to relieve pain or relax your muscles. He or she may suggest exercise and physical therapy to increase flexibility and relieve stress. You may need to wear a special (cervical) collar to support your neck for a day or two. Follow-up care is a key part of your treatment and safety. Be sure to make and go to all appointments, and call your doctor if you are having problems. It's also a good idea to know your test results and keep a list of the medicines you take. How can you care for yourself at home? · Try using a heating pad on a low or medium setting for 15 to 20 minutes every 2 or 3 hours. Try a warm shower in place of one session with the heating pad. · You can also try an ice pack for 10 to 15 minutes every 2 to 3 hours. Put a thin cloth between the ice and your skin. · Take pain medicines exactly as directed. ¨ If the doctor gave you a prescription medicine for pain, take it as prescribed. ¨ If you are not taking a prescription pain medicine, ask your doctor if you can take an over-the-counter medicine. · If your doctor recommends a cervical collar, wear it exactly as directed. When should you call for help? Call your doctor now or seek immediate medical care if:  ? · You have new or worsening numbness in your arms, buttocks or legs.    ? · You have new or worsening weakness in your arms or legs. (This could make it hard to stand up.)   ? · You lose control of your bladder or bowels. ? Watch closely for changes in your health, and be sure to contact your doctor if:  ? · Your neck pain is getting worse. ? · You are not getting better after 1 week. ? · You do not get better as expected. Where can you learn more? Go to http://www.gray.com/. Enter [id number] in the search box to learn more about \"Neck Pain: Care Instructions. \"  Current as of: March 21, 2017  Content Version: 11.5  © 6592-6636 [employer]. Care instructions adapted under license by [employer] (which disclaims liability or warranty for this information). If you have questions about a medical condition or this instruction, always ask your healthcare professional. [place] [address] any warranty or liability for your use of this information. Patient Education        Acute Kidney Injury: Care Instructions  Your Care Instructions     Acute kidney injury (CARA) is a sudden decrease in kidney function. This can happen over a period of hours, days or, in some cases, weeks. CARA used to be called acute renal failure, but kidney failure doesn't always happen with CARA. Common causes of CARA are dehydration, blood loss, and medicines. When CARA happens, the kidneys have trouble removing waste and excess fluids from the body. The waste and fluids build up and become harmful. Kidney function may return to normal if the cause of CARA is treated quickly. Your chance of a full recovery depends on what caused the problem, how quickly the cause was treated, and what other medical problems you have. A machine may be used to help your kidneys remove waste and fluids for a short period of time. This is called dialysis. Follow-up care is a key part of your treatment and safety. Be sure to make and go to all appointments, and call your doctor if you are having problems.  It's also a good idea to know your test results and keep a list of the medicines you take. How can you care for yourself at home? · Talk to your doctor about how much fluid you should drink. · Eat a balanced diet. Talk to your doctor or a dietitian about what type of diet may be best for you. You may need to limit sodium, potassium, and phosphorus. · If you need dialysis, follow the instructions and schedule for dialysis that your doctor gives you. · Do not smoke. Smoking can make your condition worse. If you need help quitting, talk to your doctor about stop-smoking programs and medicines. These can increase your chances of quitting for good. · Do not drink alcohol. · Review all of your medicines with your doctor. Do not take any medicines, including nonsteroidal anti-inflammatory drugs (NSAIDs) such as ibuprofen (Advil, Motrin) or naproxen (Aleve), unless your doctor says it is safe for you to do so. · Make sure that anyone treating you for any health problem knows that you have had CARA. When should you call for help? Call 911 anytime you think you may need emergency care. For example, call if:    · You passed out (lost consciousness). Call your doctor now or seek immediate medical care if:    · You have new or worse nausea and vomiting.     · You have much less urine than normal, or you have no urine.     · You are feeling confused or cannot think clearly.     · You have new or more blood in your urine.     · You have new swelling.     · You are dizzy or lightheaded, or feel like you may faint. Watch closely for changes in your health, and be sure to contact your doctor if:    · You do not get better as expected. Where can you learn more? Go to http://www.gray.com/  Enter R087 in the search box to learn more about \"Acute Kidney Injury: Care Instructions. \"  Current as of: April 15, 2020               Content Version: 12.6  © 3766-4222 [employer].    Care instructions adapted under license by [employer] (which disclaims liability or warranty for this information). If you have questions about a medical condition or this instruction, always ask your healthcare professional. [address] any warranty or liability for your use of this information.

## 2020-11-05 NOTE — ED PROVIDER NOTES
Pt c/o diffuse body aches, x 1-2 weeks, starts in neck, radiates to low back/chest/abd/legs/feet. Better at rest, worse movement. No swelling. No fever or cough. No chest pain. No sob. Seen at  last week, says had labs and nl cat scan, given flexeril, but pain continues. No injury. No weakness or numbness. No rash. Sx's moderate. Dull pain. No urinary or bowel changes. No nausea. Past Medical History:   Diagnosis Date    Abnormal WBC count 5/17/2016    Anemia     Bilateral shoulder pain 9/27/2016    Chondromalacia of both patellae     COPD (chronic obstructive pulmonary disease) ([facility] [address] 9/2015    mild-mod dz; Dr Millie Sanchez    Diabetes [facility]) 2013    Diabetic eye exam ([facility] [address] 2016    Dilated cardiomyopathy ([facility] [address]     Dyslipidemia     Gout     Heart attack ([facility] [address]     Heart attack ([facility] [address]     Heart attack ([facility] [address] 10/18/2019    History of echocardiogram 11/14/2014    Mild LVE. EF 40%. Mild, diffuse hypk. Mild LAE.   Mild MR.      Hypercholesteremia 1/08/14    Hypertension 2000    Noncompliance with medications 2/16/2016    Obesity     Orthostatic hypotension 5/17/2016    Osteoarthritis of both knees     Pain management 04/01/2016    Dr. Lise Leong Popliteal cyst, bilateral      Vitamin D deficiency 10/16/14       Past Surgical History:   Procedure Laterality Date    HX HEART CATHETERIZATION      HX TUBAL LIGATION           Family History:   Problem Relation Age of Onset    Diabetes Mother     Hypertension Mother     Hypertension Father     Kidney Disease Maternal Aunt 48        Dialysis       Social History     Socioeconomic History    Marital status: LEGALLY      Spouse name: Not on file    Number of children: 3    Years of education: Not on file    Highest education level: Not on file   Occupational History    Occupation: retired   Social Needs    Financial resource strain: Not on file    Food insecurity     Worry: Not on file     Inability: Not on file   [address] [facility] [address] Transportation needs     Medical: Not on file     Non-medical: Not on file   Tobacco Use    Smoking status: Former Smoker     Packs/day: 0.25     Years: 48.00     Pack years: 12.00     Types: Cigarettes     Last attempt to quit: 2018     Years since quittin.4    Smokeless tobacco: Never Used   Substance and Sexual Activity    Alcohol use: No     Alcohol/week: 0.0 standard drinks    Drug use: No    Sexual activity: Yes     Partners: Male   Lifestyle    Physical activity     Days per week: Not on file     Minutes per session: Not on file    Stress: Not on file   Relationships    Social connections     Talks on phone: Not on file     Gets together: Not on file     Attends Synagogue service: Not on file     Active member of club or organization: Not on file     Attends meetings of clubs or organizations: Not on file     Relationship status: Not on file    Intimate partner violence     Fear of current or ex partner: Not on file     Emotionally abused: Not on file     Physically abused: Not on file     Forced sexual activity: Not on file   Other Topics Concern     Service No    Blood Transfusions No    Caffeine Concern No    Occupational Exposure No    Hobby Hazards No    Sleep Concern No    Stress Concern No    Weight Concern No    Special Diet No    Back Care No    Exercise No    Bike Helmet No    Seat Belt Yes    Self-Exams Yes   Social History Narrative    Not on file         ALLERGIES: Patient has no known allergies. Review of Systems   Constitutional: Negative for fever. HENT: Negative for congestion. Respiratory: Negative for cough and shortness of breath. Cardiovascular: Positive for chest pain. Gastrointestinal: Positive for abdominal pain. Negative for vomiting. Genitourinary: Negative for difficulty urinating. Musculoskeletal: Positive for back pain, myalgias and neck pain. Skin: Negative for rash. Neurological: Negative for light-headedness.    All other systems reviewed and are negative. Vitals:    11/04/20 1917   BP: 97/70   Pulse: 85   Resp: 18   Temp: 98 °F (36.7 °C)   SpO2: 99%   Weight: 99.8 kg (220 lb)   Height: 5' 4\" (1.626 m)            Physical Exam  Vitals signs and nursing note reviewed. Constitutional:       Appearance: She is well-developed. She is not diaphoretic. HENT:      Head: Normocephalic and atraumatic. Eyes:      Pupils: Pupils are equal, round, and reactive to light. Neck:      Musculoskeletal: Normal range of motion. Cardiovascular:      Rate and Rhythm: Normal rate and regular rhythm. Heart sounds: No murmur. Pulmonary:      Effort: Pulmonary effort is normal.      Breath sounds: No wheezing. Chest:      Chest wall: Tenderness (diffuse ttp) present. Abdominal:      Palpations: Abdomen is soft. Tenderness: There is no abdominal tenderness. Musculoskeletal:         General: Tenderness (diffuse cervical/lumbar/feet ttp. no swelling. no thigh/calf ttp. + from. no rash. nvi) present. Skin:     General: Skin is dry. Capillary Refill: Capillary refill takes less than 2 seconds. Findings: No rash. Neurological:      Mental Status: She is alert and oriented to person, place, and time.           MDM       Procedures        Vitals:  Patient Vitals for the past 12 hrs:   Temp Pulse Resp BP SpO2   11/04/20 1917 98 °F (36.7 °C) 85 18 97/70 99 %         Medications ordered:   Medications   oxyCODONE-acetaminophen (PERCOCET) 5-325 mg per tablet 1 Tab (1 Tab Oral Given 11/4/20 1949)         Lab findings:  Recent Results (from the past 12 hour(s))   EKG, 12 LEAD, INITIAL    Collection Time: 11/04/20  7:34 PM   Result Value Ref Range    Ventricular Rate 72 BPM    Atrial Rate 72 BPM    P-R Interval 180 ms    QRS Duration 82 ms    Q-T Interval 426 ms    QTC Calculation (Bezet) 466 ms    Calculated P Axis 49 degrees    Calculated R Axis 15 degrees    Calculated T Axis 89 degrees    Diagnosis       Normal sinus rhythm  Minimal voltage criteria for LVH, may be normal variant  Borderline ECG  When compared with ECG of 22-OCT-2020 17:29,  QRS axis shifted left     CBC WITH AUTOMATED DIFF    Collection Time: 11/04/20  7:41 PM   Result Value Ref Range    WBC 5.6 4.6 - 13.2 K/uL    RBC 4.03 (L) 4.20 - 5.30 M/uL    HGB 11.3 (L) 12.0 - 16.0 g/dL    HCT 37.2 35.0 - 45.0 %    MCV 92.3 74.0 - 97.0 FL    MCH 28.0 24.0 - 34.0 PG    MCHC 30.4 (L) 31.0 - 37.0 g/dL    RDW 14.8 (H) 11.6 - 14.5 %    PLATELET 570 438 - 392 K/uL    MPV 9.0 (L) 9.2 - 11.8 FL    NEUTROPHILS 63 40 - 73 %    LYMPHOCYTES 27 21 - 52 %    MONOCYTES 9 3 - 10 %    EOSINOPHILS 1 0 - 5 %    BASOPHILS 0 0 - 2 %    ABS. NEUTROPHILS 3.5 1.8 - 8.0 K/UL    ABS. LYMPHOCYTES 1.5 0.9 - 3.6 K/UL    ABS. MONOCYTES 0.5 0.05 - 1.2 K/UL    ABS. EOSINOPHILS 0.1 0.0 - 0.4 K/UL    ABS. BASOPHILS 0.0 0.0 - 0.1 K/UL    DF AUTOMATED     METABOLIC PANEL, BASIC    Collection Time: 11/04/20  7:41 PM   Result Value Ref Range    Sodium 137 136 - 145 mmol/L    Potassium 4.5 3.5 - 5.5 mmol/L    Chloride 102 100 - 111 mmol/L    CO2 26 21 - 32 mmol/L    Anion gap 9 3.0 - 18 mmol/L    Glucose 187 (H) 74 - 99 mg/dL    BUN 30 (H) 7.0 - 18 MG/DL    Creatinine 2.28 (H) 0.6 - 1.3 MG/DL    BUN/Creatinine ratio 13 12 - 20      GFR est AA 26 (L) >60 ml/min/1.73m2    GFR est non-AA 21 (L) >60 ml/min/1.73m2    Calcium 9.0 8.5 - 10.1 MG/DL   LIPASE    Collection Time: 11/04/20  7:41 PM   Result Value Ref Range    Lipase 363 73 - 393 U/L   HEPATIC FUNCTION PANEL    Collection Time: 11/04/20  7:41 PM   Result Value Ref Range    Protein, total 6.9 6.4 - 8.2 g/dL    Albumin 3.2 (L) 3.4 - 5.0 g/dL    Globulin 3.7 2.0 - 4.0 g/dL    A-G Ratio 0.9 0.8 - 1.7      Bilirubin, total 0.7 0.2 - 1.0 MG/DL    Bilirubin, direct 0.2 0.0 - 0.2 MG/DL    Alk.  phosphatase 141 (H) 45 - 117 U/L    AST (SGOT) 11 10 - 38 U/L    ALT (SGPT) 12 (L) 13 - 56 U/L   CARDIAC PANEL,(CK, CKMB & TROPONIN)    Collection Time: 11/04/20  7:41 PM Result Value Ref Range    CK - MB <1.0 <3.6 ng/ml    CK-MB Index  0.0 - 4.0 %     CALCULATION NOT PERFORMED WHEN RESULT IS BELOW LINEAR LIMIT    CK 46 26 - 192 U/L    Troponin-I, QT <0.02 0.0 - 0.045 NG/ML           X-Ray, CT or other radiology findings or impressions:  No orders to display       Progress notes, Consult notes or additional Procedure notes:   9:01 PM markedly improved, declines repeat ct chest or adding ct abd as offered. Declines waiting for urine, says pain mostly in neck and feet, nl urination. Wants dc. H/o renal failure, pt says aware, but worsened now. No hyperkal/acidosis/fluid overload/uremic complications. Pt wants dc. Verb und of need or close f/u. To dc per pt req. No emc. Diagnosis:   1. Myalgia    2. Acute renal failure, unspecified acute renal failure type (HCC)    3. Cervical pain    4. Hyperglycemia        Disposition: home    Follow-up Information     Follow up With Specialties Details Why Contact Info    Melodie Drummond MD Family Medicine Schedule an appointment as soon as possible for a visit in 1 day  [address] [facility]      Héctor Ross MD Nephrology Schedule an appointment as soon as possible for a visit in 3 days  [address] DEPT Emergency Medicine Go to As needed [address] [facility]  [phone]           Patient's Medications   Start Taking    OXYCODONE-ACETAMINOPHEN (PERCOCET) 5-325 MG PER TABLET    Take 1 Tab by mouth every twelve (12) hours as needed for Pain for up to 7 days. Max Daily Amount: 2 Tabs. Continue Taking    ACETAMINOPHEN (TYLENOL EXTRA STRENGTH) 500 MG TABLET    Take 2 Tabs by mouth every six (6) hours as needed for Pain. ALBUTEROL (PROVENTIL HFA, VENTOLIN HFA, PROAIR HFA) 90 MCG/ACTUATION INHALER    Take 2 Puffs by inhalation every four (4) hours as needed for Wheezing.     ASPIRIN 81 MG CHEWABLE TABLET    Take 2 Tabs by mouth daily. ATORVASTATIN (LIPITOR) 80 MG TABLET    Take 1 Tab by mouth daily. CARVEDILOL (COREG) 25 MG TABLET    Take 1 Tab by mouth two (2) times daily (with meals). DESLORATADINE (CLARINEX) 5 MG TABLET    Take 1 Tab by mouth daily as needed for Allergies. FUROSEMIDE (LASIX) 40 MG TABLET    Take 1 Tab by mouth two (2) times daily as needed (edema). INSULIN DETEMIR (LEVEMIR) 100 UNIT/ML INJECTION    15 units daily for diabetes    LIDOCAINE (LIDODERM) 5 %    Apply patch to the affected area for 12 hours a day and remove for 12 hours a day. MOMETASONE (NASONEX) 50 MCG/ACTUATION NASAL SPRAY    2 Sprays by Both Nostrils route daily as needed. For allergies    PANTOPRAZOLE (PROTONIX) 40 MG TABLET    Take 1 Tab by mouth daily. SACUBITRIL-VALSARTAN (ENTRESTO)  MG TABLET    Take 1 Tab by mouth two (2) times a day. TIZANIDINE (ZANAFLEX) 4 MG TABLET    Take 4 mg by mouth two (2) times a day. UMECLIDINIUM-VILANTEROL (ANORO ELLIPTA) 62.5-25 MCG/ACTUATION INHALER    Take 1 Puff by inhalation daily.  For COPD   These Medications have changed    No medications on file   Stop Taking    No medications on file

## 2020-11-05 NOTE — ED TRIAGE NOTES
Pt c/o bilateral leg and foot pain, back pain and rib pain since last week, was seen at [facility] 10.22.20 for the same complaint, pt claimed that she's not getting any better.

## 2020-11-08 ENCOUNTER — HOSPITAL ENCOUNTER (EMERGENCY)
Age: 70
Discharge: HOME | End: 2020-11-08
Attending: EMERGENCY MEDICINE | Admitting: EMERGENCY MEDICINE
Payer: MEDICARE

## 2020-11-08 ENCOUNTER — APPOINTMENT (OUTPATIENT)
Dept: GENERAL RADIOLOGY | Age: 70
End: 2020-11-08
Attending: EMERGENCY MEDICINE
Payer: MEDICARE

## 2020-11-08 VITALS
HEIGHT: 64 IN | WEIGHT: 226 LBS | DIASTOLIC BLOOD PRESSURE: 55 MMHG | HEART RATE: 77 BPM | RESPIRATION RATE: 18 BRPM | TEMPERATURE: 99 F | SYSTOLIC BLOOD PRESSURE: 153 MMHG | OXYGEN SATURATION: 96 % | BODY MASS INDEX: 38.58 KG/M2

## 2020-11-08 DIAGNOSIS — M17.12 PRIMARY OSTEOARTHRITIS OF LEFT KNEE: ICD-10-CM

## 2020-11-08 DIAGNOSIS — S80.02XA CONTUSION OF LEFT KNEE, INITIAL ENCOUNTER: Primary | ICD-10-CM

## 2020-11-08 PROCEDURE — 73562 X-RAY EXAM OF KNEE 3: CPT

## 2020-11-08 PROCEDURE — 99282 EMERGENCY DEPT VISIT SF MDM: CPT

## 2020-11-08 RX ORDER — PREDNISONE 20 MG/1
20 TABLET ORAL DAILY
Qty: 5 TAB | Refills: 0 | Status: SHIPPED | OUTPATIENT
Start: 2020-11-08 | End: 2020-11-13

## 2020-11-08 NOTE — ED PROVIDER NOTES
HPI patient says she slipped and fell in her bathroom days ago. She says when she fell she landed on both of her knees. She comes emergency room today stating that her left knee has been sore and aching ever since her fall. She says it hurts worse with standing or weightbearing. She says symptoms are made better by sitting down and elevating her legs. She says her right knee was sore initially after the fall but now the right knee feels much better and is essentially returned to normal.  No other specifics given at this time. Past Medical History:   Diagnosis Date    Abnormal WBC count 5/17/2016    Anemia     Bilateral shoulder pain 9/27/2016    Chondromalacia of both patellae     COPD (chronic obstructive pulmonary disease) ([facility] [address] 9/2015    mild-mod dz; Dr Esteves Erp    Diabetes [facility]) 2013    Diabetic eye exam ([facility] [address] 2016    Dilated cardiomyopathy ([facility] [address]     Dyslipidemia     Gout     Heart attack ([facility] [address] [facility]     Heart attack ([facility] [address]     Heart attack ([facility] [address] 10/18/2019    History of echocardiogram 11/14/2014    Mild LVE. EF 40%. Mild, diffuse hypk. Mild LAE.   Mild MR.      Hypercholesteremia 1/08/14    Hypertension 2000    Noncompliance with medications 2/16/2016    Obesity     Orthostatic hypotension 5/17/2016    Osteoarthritis of both knees     Pain management 04/01/2016    Dr. Trang Rosenbaum Popliteal cyst, bilateral      Vitamin D deficiency 10/16/14       Past Surgical History:   Procedure Laterality Date    HX HEART CATHETERIZATION      HX TUBAL LIGATION           Family History:   Problem Relation Age of Onset    Diabetes Mother     Hypertension Mother     Hypertension Father     Kidney Disease Maternal Aunt 48        Dialysis       Social History     Socioeconomic History    Marital status: LEGALLY      Spouse name: Not on file    Number of children: 3    Years of education: Not on file    Highest education level: Not on file   Occupational History    Occupation: retired   Social Needs    Financial resource strain: Not on file    Food insecurity     Worry: Not on file     Inability: Not on file   [employer] needs     Medical: Not on file     Non-medical: Not on file   Tobacco Use    Smoking status: Former Smoker     Packs/day: 0.25     Years: 48.00     Pack years: 12.00     Types: Cigarettes     Last attempt to quit: 2018     Years since quittin.4    Smokeless tobacco: Never Used   Substance and Sexual Activity    Alcohol use: No     Alcohol/week: 0.0 standard drinks    Drug use: No    Sexual activity: Yes     Partners: Male   Lifestyle    Physical activity     Days per week: Not on file     Minutes per session: Not on file    Stress: Not on file   Relationships    Social connections     Talks on phone: Not on file     Gets together: Not on file     Attends [employer] service: Not on file     Active member of club or organization: Not on file     Attends meetings of clubs or organizations: Not on file     Relationship status: Not on file    Intimate partner violence     Fear of current or ex partner: Not on file     Emotionally abused: Not on file     Physically abused: Not on file     Forced sexual activity: Not on file   Other Topics Concern     Service No    Blood Transfusions No    Caffeine Concern No    Occupational Exposure No    Hobby Hazards No    Sleep Concern No    Stress Concern No    Weight Concern No    Special Diet No    Back Care No    Exercise No    Bike Helmet No    Seat Belt Yes    Self-Exams Yes   Social History Narrative    Not on file         ALLERGIES: Patient has no known allergies. Review of Systems   Constitutional: Negative. HENT: Negative. Respiratory: Negative. Cardiovascular: Negative. Gastrointestinal: Negative. Genitourinary: Negative. Musculoskeletal: Positive for arthralgias. Neurological: Negative. Psychiatric/Behavioral: Negative.         Vitals:    20 1418   BP: (!) 153/55   Pulse: 77   Resp: 18   Temp: 99 °F (37.2 °C)   SpO2: 96%   Weight: 102.5 kg (226 lb)   Height: 5' 4\" (1.626 m)            Physical Exam  Vitals signs and nursing note reviewed. Constitutional:       Appearance: She is well-developed. HENT:      Head: Normocephalic and atraumatic. Neck:      Musculoskeletal: Normal range of motion and neck supple. Cardiovascular:      Rate and Rhythm: Normal rate and regular rhythm. Pulmonary:      Effort: Pulmonary effort is normal.      Breath sounds: Normal breath sounds. Musculoskeletal: Normal range of motion. General: Tenderness present. Comments: Left knee is mildly and diffusely sore to palpation anteriorly. No effusion noted. No swelling ecchymosis induration or skin changes noted. Ligaments are stable patella is normally positioned. Normal left ankle and foot exam.   Skin:     General: Skin is warm and dry. Neurological:      Mental Status: She is alert and oriented to person, place, and time. MDM  Number of Diagnoses or Management Options  Diagnosis management comments: I reviewed the results of the x-ray with the patient. She now provides additional history stating she is been treated for degenerative joint disease in the same knee by her orthopedist.  She is also saying that she feels she is having exacerbation of gout in her left foot and she would like a short course of p.o. steroids for this. I have agreed to give her a short course of steroids and then recommend she follow-up with her orthopedist for further evaluation. Patient understands and agrees with this disposition and follow-up plan.   Mel Diaz MD 3:25 PM         Procedures

## 2021-01-25 ENCOUNTER — OFFICE VISIT (OUTPATIENT)
Dept: CARDIOLOGY CLINIC | Age: 71
End: 2021-01-25
Payer: MEDICARE

## 2021-01-25 VITALS
WEIGHT: 242.6 LBS | BODY MASS INDEX: 41.42 KG/M2 | RESPIRATION RATE: 18 BRPM | SYSTOLIC BLOOD PRESSURE: 135 MMHG | HEART RATE: 80 BPM | HEIGHT: 64 IN | TEMPERATURE: 97.2 F | DIASTOLIC BLOOD PRESSURE: 57 MMHG | OXYGEN SATURATION: 95 %

## 2021-01-25 DIAGNOSIS — E78.5 DYSLIPIDEMIA, GOAL LDL BELOW 70: ICD-10-CM

## 2021-01-25 DIAGNOSIS — I25.5 ISCHEMIC CARDIOMYOPATHY: ICD-10-CM

## 2021-01-25 DIAGNOSIS — I50.22 CHRONIC SYSTOLIC CONGESTIVE HEART FAILURE (HCC): ICD-10-CM

## 2021-01-25 DIAGNOSIS — I34.0 NONRHEUMATIC MITRAL VALVE REGURGITATION: Primary | ICD-10-CM

## 2021-01-25 DIAGNOSIS — I10 ESSENTIAL HYPERTENSION: ICD-10-CM

## 2021-01-25 DIAGNOSIS — I25.10 CORONARY ARTERY DISEASE INVOLVING NATIVE CORONARY ARTERY OF NATIVE HEART WITHOUT ANGINA PECTORIS: ICD-10-CM

## 2021-01-25 PROCEDURE — G8536 NO DOC ELDER MAL SCRN: HCPCS | Performed by: INTERNAL MEDICINE

## 2021-01-25 PROCEDURE — G9899 SCRN MAM PERF RSLTS DOC: HCPCS | Performed by: INTERNAL MEDICINE

## 2021-01-25 PROCEDURE — 1100F PTFALLS ASSESS-DOCD GE2>/YR: CPT | Performed by: INTERNAL MEDICINE

## 2021-01-25 PROCEDURE — 3017F COLORECTAL CA SCREEN DOC REV: CPT | Performed by: INTERNAL MEDICINE

## 2021-01-25 PROCEDURE — G8754 DIAS BP LESS 90: HCPCS | Performed by: INTERNAL MEDICINE

## 2021-01-25 PROCEDURE — 1090F PRES/ABSN URINE INCON ASSESS: CPT | Performed by: INTERNAL MEDICINE

## 2021-01-25 PROCEDURE — 99214 OFFICE O/P EST MOD 30 MIN: CPT | Performed by: INTERNAL MEDICINE

## 2021-01-25 PROCEDURE — G8752 SYS BP LESS 140: HCPCS | Performed by: INTERNAL MEDICINE

## 2021-01-25 PROCEDURE — G8427 DOCREV CUR MEDS BY ELIG CLIN: HCPCS | Performed by: INTERNAL MEDICINE

## 2021-01-25 PROCEDURE — G8510 SCR DEP NEG, NO PLAN REQD: HCPCS | Performed by: INTERNAL MEDICINE

## 2021-01-25 PROCEDURE — 3288F FALL RISK ASSESSMENT DOCD: CPT | Performed by: INTERNAL MEDICINE

## 2021-01-25 PROCEDURE — G8417 CALC BMI ABV UP PARAM F/U: HCPCS | Performed by: INTERNAL MEDICINE

## 2021-01-25 PROCEDURE — G8399 PT W/DXA RESULTS DOCUMENT: HCPCS | Performed by: INTERNAL MEDICINE

## 2021-01-25 RX ORDER — CARVEDILOL 25 MG/1
25 TABLET ORAL 2 TIMES DAILY WITH MEALS
Qty: 180 TAB | Refills: 3 | Status: SHIPPED | OUTPATIENT
Start: 2021-01-25 | End: 2022-09-22

## 2021-01-25 RX ORDER — SACUBITRIL AND VALSARTAN 97; 103 MG/1; MG/1
1 TABLET, FILM COATED ORAL 2 TIMES DAILY
Qty: 180 TAB | Refills: 2 | Status: SHIPPED | OUTPATIENT
Start: 2021-01-25 | End: 2021-03-23

## 2021-01-25 RX ORDER — ATORVASTATIN CALCIUM 80 MG/1
80 TABLET, FILM COATED ORAL DAILY
Qty: 90 TAB | Refills: 3 | Status: SHIPPED | OUTPATIENT
Start: 2021-01-25

## 2021-01-25 RX ORDER — FUROSEMIDE 40 MG/1
40 TABLET ORAL
Qty: 180 TAB | Refills: 1 | Status: SHIPPED | OUTPATIENT
Start: 2021-01-25 | End: 2021-03-23

## 2021-01-25 NOTE — PROGRESS NOTES
HISTORY OF PRESENT ILLNESS  Gloria Francois is a 78 y.o. female. History of anterior wall MI complicated by cardiac arrest due to acute stent closure requiring repeat PCI to her LAD with 2 YADIRA 5/2018        Cardiomyopathy  The history is provided by the patient and medical records. This is a chronic problem. The current episode started more than 1 week ago. The problem occurs constantly. The problem has not changed since onset. Associated symptoms include shortness of breath. Pertinent negatives include no chest pain and no headaches. The symptoms are aggravated by exertion. The symptoms are relieved by rest.   Hypertension  The history is provided by the patient and medical records. This is a chronic problem. The current episode started more than 1 week ago. Associated symptoms include shortness of breath. Pertinent negatives include no chest pain and no headaches. Valvular Heart Disease  History provided by: MRSamara Associated symptoms include shortness of breath. Pertinent negatives include no chest pain and no headaches. CHF  The history is provided by the medical records. This is a chronic problem. Associated symptoms include shortness of breath. Pertinent negatives include no chest pain and no headaches. Cholesterol Problem  The history is provided by the medical records. This is a chronic problem. Associated symptoms include shortness of breath. Pertinent negatives include no chest pain and no headaches. Shortness of Breath  The history is provided by the patient. This is a chronic problem. The problem occurs intermittently. The current episode started more than 1 week ago. The problem has not changed since onset. Associated symptoms include leg swelling. Pertinent negatives include no fever, no headaches, no cough, no wheezing, no PND, no orthopnea, no chest pain, no vomiting, no rash and no claudication. The problem's precipitants include exercise (1 flight; 10/19 50 ft; 2/20 steps; 8/20 house chores; ). Leg Swelling  The history is provided by the patient. This is a recurrent problem. The current episode started more than 1 week ago. The problem occurs every several days. The problem has been rapidly improving. Associated symptoms include shortness of breath. Pertinent negatives include no chest pain and no headaches. The symptoms are aggravated by standing. The symptoms are relieved by sleep and medications. Chest Pain (Angina)   The history is provided by the patient. This is a new problem. The current episode started 2 days ago. The problem has been resolved. The problem occurs constantly. The pain is associated with movement. The pain is present in the lateral region and left side. The quality of the pain is described as sharp. The pain radiates to the left shoulder. Associated symptoms include lower extremity edema and shortness of breath. Pertinent negatives include no claudication, no cough, no diaphoresis, no dizziness, no fever, no headaches, no malaise/fatigue, no nausea, no orthopnea, no palpitations, no PND and no vomiting. Review of Systems   Constitutional: Negative for chills, diaphoresis, fever, malaise/fatigue and weight loss. HENT: Negative for nosebleeds. Eyes: Negative for discharge. Respiratory: Positive for shortness of breath. Negative for cough and wheezing. Cardiovascular: Positive for leg swelling. Negative for chest pain, palpitations, orthopnea, claudication and PND. Gastrointestinal: Negative for diarrhea, nausea and vomiting. Genitourinary: Negative for dysuria and hematuria. Musculoskeletal: Negative for falls and joint pain. Skin: Negative for rash. Neurological: Negative for dizziness, seizures, loss of consciousness and headaches. Endo/Heme/Allergies: Negative for polydipsia. Does not bruise/bleed easily. Psychiatric/Behavioral: Negative for depression and substance abuse. The patient does not have insomnia.       No Known Allergies    Past Medical History:   Diagnosis Date    Abnormal WBC count 2016    Anemia     Bilateral shoulder pain 2016    Chondromalacia of both patellae     COPD (chronic obstructive pulmonary disease) ([facility] [address] 2015    mild-mod dz; Dr Renee Winslow    Diabetes [facility])     Diabetic eye exam ([facility] [address] 2016    Dilated cardiomyopathy ([facility] [address]     Dyslipidemia     Gout     [facility]     [facility]     [facility] 10/18/2019    History of echocardiogram 2014    Mild LVE. EF 40%. Mild, diffuse hypk. Mild LAE. Mild MR.      Hypercholesteremia 14    Hypertension     Noncompliance with medications 2016    Obesity     Orthostatic hypotension 2016    Osteoarthritis of both knees     Pain management 2016    Dr. Chidi Rocha Popliteal cyst, bilateral      Vitamin D deficiency 10/16/14       Family History   Problem Relation Age of Onset    Diabetes Mother     Hypertension Mother     Hypertension Father     Kidney Disease Maternal Aunt 48        Dialysis       Social History     Tobacco Use    Smoking status: Former Smoker     Packs/day: 0.25     Years: 48.00     Pack years: 12.00     Types: Cigarettes     Quit date: 2018     Years since quittin.6    Smokeless tobacco: Never Used   Substance Use Topics    Alcohol use: No     Alcohol/week: 0.0 standard drinks    Drug use: No        Current Outpatient Medications   Medication Sig    tiZANidine (ZANAFLEX) 4 mg tablet Take 4 mg by mouth two (2) times a day.  lidocaine (Lidoderm) 5 % Apply patch to the affected area for 12 hours a day and remove for 12 hours a day.  sacubitriL-valsartan (Entresto)  mg tablet Take 1 Tab by mouth two (2) times a day.  carvediloL (COREG) 25 mg tablet Take 1 Tab by mouth two (2) times daily (with meals).  furosemide (LASIX) 40 mg tablet Take 1 Tab by mouth two (2) times daily as needed (edema).     atorvastatin (LIPITOR) 80 mg tablet Take 1 Tab by mouth daily.    pantoprazole (PROTONIX) 40 mg tablet Take 1 Tab by mouth daily.  albuterol (PROVENTIL HFA, VENTOLIN HFA, PROAIR HFA) 90 mcg/actuation inhaler Take 2 Puffs by inhalation every four (4) hours as needed for Wheezing.  aspirin 81 mg chewable tablet Take 2 Tabs by mouth daily.  insulin detemir (LEVEMIR) 100 unit/mL injection 15 units daily for diabetes    desloratadine (CLARINEX) 5 mg tablet Take 1 Tab by mouth daily as needed for Allergies.  mometasone (NASONEX) 50 mcg/actuation nasal spray 2 Sprays by Both Nostrils route daily as needed. For allergies    umeclidinium-vilanterol (ANORO ELLIPTA) 62.5-25 mcg/actuation inhaler Take 1 Puff by inhalation daily. For COPD     No current facility-administered medications for this visit. Past Surgical History:   Procedure Laterality Date    HX HEART CATHETERIZATION      HX TUBAL LIGATION         Visit Vitals  BP (!) 135/57   Pulse 80   Temp 97.2 °F (36.2 °C) (Temporal)   Resp 18   Ht 5' 4\" (1.626 m)   Wt 110 kg (242 lb 9.6 oz)   SpO2 95%   BMI 41.64 kg/m²       Diagnostic Studies:  I have reviewed the relevant tests done on the patient and show as follows  EKG tracings reviewed by me today. EKG Results     None        XR Results (most recent):  Results from Hospital Encounter encounter on 11/08/20   XR KNEE LT 3 V    Narrative LEFT KNEE, THREE VIEWS    CPT CODE: 96149    INDICATION: Above. Left knee pain/injury status post fall    COMPARISON: 10/12/2015. TECHNIQUE: AP, lateral and internally rotated oblique views of the left knee are  reviewed. FINDINGS:     There is no evidence of acute fracture or dislocation. There is mild fullness  of the suprapatellar soft tissues suspicious for a small joint effusion. Mild to  moderate degenerative changes with periarticular osteophytosis best seen at the  medial and patellofemoral compartments, similar compared to the prior  radiographs.  Anterior patellar enthesophytes also again incidentally noted.  Vascular calcifications. Impression Impression:     No evidence of acute fracture or dislocation. Evidence for small joint effusion. Chronic degenerative changes. 08/07/19   ECHO ADULT COMPLETE 08/07/2019 8/7/2019    Narrative · Left Ventricle: Normal cavity size and wall thickness. Mild systolic   dysfunction. Estimated left ventricular ejection fraction is 46 - 50%. Abnormal left ventricular wall motion. Moderate (grade 2) left ventricular   diastolic dysfunction. · Left Atrium: Moderately dilated left atrium. · Aortic Valve: Aortic valve sclerosis. · Mitral Valve: Mitral valve thickening. Moderate to severe mitral valve   regurgitation. · Tricuspid Valve: Mild tricuspid valve regurgitation is present. Pulmonary arterial systolic pressure is 22.7 mmHg. Moderate pulmonary   hypertension. · Pulmonic Valve: Mild pulmonic valve regurgitation is present. · IVC/Hepatic Veins: Severely elevated central venous pressure (15+ mmHg);   IVC diameter is larger than 21 mm and collapses less than 50% with   respiration. Signed by: Roula Hager MD       08/25/19   NUCLEAR CARDIAC STRESS TEST 08/28/2019 8/28/2019    Narrative · Baseline ECG: Normal sinus rhythm, occasional PACs. · Gated SPECT: Left ventricular function post-stress was abnormal.   Calculated ejection fraction is 44%. The TID ratio is 0.94. · Negative stress test.  · Left ventricular perfusion is abnormal.  · Myocardial perfusion imaging defect 1: There is a defect that is   moderate in size with a severe reduction in uptake present in the   mid-apical anterior and apex location(s) that is non-reversible. There is   abnormal wall motion in the defect area. Viability in the area is poor. The defect appears to be infarction. Perfusion defect was visually present   without quantitative evidence. · Positive myocardial perfusion imaging.  Myocardial perfusion imaging   supports an intermediate risk stress test. Signed by: Haley Aranda MD        10/18/19   ECHO ADULT FOLLOW-UP OR LIMITED 10/21/2019 10/21/2019    Narrative · Left Ventricle: Mildly dilated left ventricle. Mild concentric   hypertrophy. Mild-to-moderate systolic dysfunction. Calculated left   ventricular ejection fraction is 46%. Biplane method used to measure   ejection fraction. · Mitral Valve: Mitral valve thickening. Mitral annular calcification. Moderate mitral valve regurgitation is present. The mitral regurgitant jet   is eccentric. · Interatrial Septum: Agitated saline contrast study was performed. No   obvious septal defect. Signed by: Vi Castro MD     10/18/19   CARDIAC PROCEDURE 10/25/2019 10/25/2019    Narrative Patent stent in proximal LAD. Mildly reduced LV function. Continue intense risk factor modification. Signed by: Vi Castro MD     10/20 echo  Interpretation Summary    · LV: Calculated LVEF is 50%. Visually measured ejection fraction. Normal cavity size and wall thickness. Low normal systolic function. Mild hypokinesis of the basal anteroseptal, basal inferoseptal, basal inferior, mid anteroseptal, mid inferoseptal, mid inferior, apical septal and apical inferior wall(s). The [place] was noted to be mildly hypokinetic. Moderate (grade 2) left ventricular diastolic dysfunction. · LA: Mildly dilated left atrium. Left Atrium volume index is 37.4 mL/m2. · RV: Mildly dilated right ventricle. · MV: Mild to moderate mitral valve regurgitation is present. Comparison Study Information    Prior Study    When compared to the previous study from 10/19/19, the right ventricle now appears mildly dilated. Ms. Angel Goodrich has a reminder for a \"due or due soon\" health maintenance. I have asked that she contact her primary care provider for follow-up on this health maintenance. Physical Exam   Constitutional: She is oriented to person, place, and time. She appears well-developed and well-nourished. No distress. HENT:   Head: Normocephalic and atraumatic. Mouth/Throat: Normal dentition. Eyes: Right eye exhibits no discharge. Left eye exhibits no discharge. No scleral icterus. Neck: Neck supple. No JVD present. Carotid bruit is not present. No thyromegaly present. Cardiovascular: Normal rate, regular rhythm, S1 normal, S2 normal, normal heart sounds and intact distal pulses. Exam reveals no gallop and no friction rub. No murmur heard. Pulmonary/Chest: Effort normal. No respiratory distress. She has wheezes (minimal post exp). She has no rales. She exhibits no tenderness. Abdominal: Soft. She exhibits no distension and no mass. There is no abdominal tenderness. Musculoskeletal: Normal range of motion. General: No edema. Lymphadenopathy:        Right cervical: No superficial cervical adenopathy present. Left cervical: No superficial cervical adenopathy present. Neurological: She is alert and oriented to person, place, and time. Skin: Skin is warm and dry. No rash noted. Psychiatric: She has a normal mood and affect. Her behavior is normal.       ASSESSMENT and PLAN      HLD : Results for Sanjeev Yee (MRN [id number]) as of 10/11/2019 12:54   Ref. Range 9/10/2019 00:00 9/30/2019 17:00 10/10/2019 00:00   Triglyceride Latest Ref Range: 0 - 149 mg/dL 67  101   Cholesterol, total Latest Ref Range: 100 - 199 mg/dL 168  152   HDL Cholesterol Latest Ref Range: >39 mg/dL 55  46   LDL, calculated Latest Ref Range: 0 - 99 mg/dL 100 (H)  86   VLDL, calculated Latest Ref Range: 5 - 40 mg/dL 13  20     Mitral regurgitation: 10/19 moderate;    10/20 CHF is improving well. Advised patient to skip Lasix if there is no swelling. NYHA class II  Chest pain is musculoskeletal and tramadol prescription was given. Follow-up electrolytes periodically. Lipids to be checked again as they were not done in May when she had other labs. She will try to lose weight.   Diet weight and exercise discussed and she is trying to follow. 1/21 CHF is compensated. NYHA class II. Chest pains have resolved. Labs showed worsening renal function. Since no edema, recommended that she take Lasix only as needed. Diet weight and exercise discussed. Mediterranean diet guidelines printed. Diagnoses and all orders for this visit:    1. Nonrheumatic mitral valve regurgitation    2. Dyslipidemia, goal LDL below 70  -     atorvastatin (LIPITOR) 80 mg tablet; Take 1 Tab by mouth daily. 3. Essential hypertension  -     carvediloL (COREG) 25 mg tablet; Take 1 Tab by mouth two (2) times daily (with meals). 4. Chronic systolic congestive heart failure (HCC)  -     carvediloL (COREG) 25 mg tablet; Take 1 Tab by mouth two (2) times daily (with meals). -     sacubitriL-valsartan (Entresto)  mg tablet; Take 1 Tab by mouth two (2) times a day. -     furosemide (LASIX) 40 mg tablet; Take 1 Tab by mouth two (2) times daily as needed (edema). -     METABOLIC PANEL, BASIC; Future    5. Coronary artery disease involving native coronary artery of native heart without angina pectoris    6. Ischemic cardiomyopathy        Pertinent laboratory and test data reviewed and discussed with patient.   See patient instructions also for other medical advice given    Medications Discontinued During This Encounter   Medication Reason    acetaminophen (Tylenol Extra Strength) 500 mg tablet Therapy Completed    furosemide (LASIX) 40 mg tablet REORDER    atorvastatin (LIPITOR) 80 mg tablet REORDER    sacubitriL-valsartan (Entresto)  mg tablet REORDER    carvediloL (COREG) 25 mg tablet REORDER       Follow-up and Dispositions    · Return in about 6 months (around 7/25/2021), or if symptoms worsen or fail to improve, for post test.

## 2021-01-25 NOTE — PATIENT INSTRUCTIONS
Medications Discontinued During This Encounter   Medication Reason    acetaminophen (Tylenol Extra Strength) 500 mg tablet Therapy Completed    furosemide (LASIX) 40 mg tablet REORDER    atorvastatin (LIPITOR) 80 mg tablet REORDER    sacubitriL-valsartan (Entresto)  mg tablet REORDER    carvediloL (COREG) 25 mg tablet REORDER          Learning About the Mediterranean Diet  What is the Mediterranean diet? The Mediterranean diet is a style of eating rather than a diet plan. It features foods eaten in [place], [place], [place] and [place], and other countries along the [facility]. It emphasizes eating foods like fish, fruits, vegetables, beans, high-fiber breads and whole grains, nuts, and olive oil. This style of eating includes limited red meat, cheese, and sweets. Why choose the Mediterranean diet? A Mediterranean-style diet may improve heart health. It contains more fat than other heart-healthy diets. But the fats are mainly from nuts, unsaturated oils (such as fish oils and olive oil), and certain nut or seed oils (such as canola, soybean, or flaxseed oil). These fats may help protect the heart and blood vessels. How can you get started on the Mediterranean diet? Here are some things you can do to switch to a more Mediterranean way of eating. What to eat  · Eat a variety of fruits and vegetables each day, such as grapes, blueberries, tomatoes, broccoli, peppers, figs, olives, spinach, eggplant, beans, lentils, and chickpeas. · Eat a variety of whole-grain foods each day, such as oats, brown rice, and whole wheat bread, pasta, and couscous. · Eat fish at least 2 times a week. Try tuna, salmon, mackerel, lake trout, herring, or sardines. · Eat moderate amounts of low-fat dairy products, such as milk, cheese, or yogurt. · Eat moderate amounts of poultry and eggs.   · Choose healthy (unsaturated) fats, such as nuts, olive oil, and certain nut or seed oils like canola, soybean, and flaxseed.  · Limit unhealthy (saturated) fats, such as butter, palm oil, and coconut oil. And limit fats found in animal products, such as meat and dairy products made with whole milk. Try to eat red meat only a few times a month in very small amounts.  · Limit sweets and desserts to only a few times a week. This includes sugar-sweetened drinks like soda.  The Mediterranean diet may also include red wine with your meal--1 glass each day for women and up to 2 glasses a day for men.  Tips for eating at home  · Use herbs, spices, garlic, lemon zest, and citrus juice instead of salt to add flavor to foods.  · Add avocado slices to your sandwich instead of [place].  · Have fish for lunch or dinner instead of red meat. Brush the fish with olive oil, and broil or grill it.  · Sprinkle your salad with seeds or nuts instead of cheese.  · Cook with olive or canola oil instead of butter or oils that are high in saturated fat.  · Switch from 2% milk or whole milk to 1% or fat-free milk.  · Dip raw vegetables in a vinaigrette dressing or hummus instead of dips made from mayonnaise or sour cream.  · Have a piece of fruit for dessert instead of a piece of cake. Try baked apples, or have some dried fruit.  Tips for eating out  · Try broiled, grilled, baked, or poached fish instead of having it fried or breaded.  · Ask your  to have your meals prepared with olive oil instead of butter.  · Order dishes made with marinara sauce or sauces made from olive oil. Avoid sauces made from cream or mayonnaise.  · Choose whole-grain breads, whole wheat pasta and pizza crust, brown rice, beans, and lentils.  · Cut back on butter or margarine on bread. Instead, you can dip your bread in a small amount of olive oil.  · Ask for a side salad or grilled vegetables instead of french fries or chips.  Where can you learn more?  Go to https://www.healthwise.net/GoodHelpConnections  Enter O407 in the search box to learn more about \"Learning About the  Mediterranean Diet. \"  Current as of: August 22, 2019               Content Version: 12.6  © 1708-1658 [employer]. Care instructions adapted under license by [employer] (which disclaims liability or warranty for this information). If you have questions about a medical condition or this instruction, always ask your healthcare professional. [address] any warranty or liability for your use of this information.

## 2021-01-25 NOTE — PROGRESS NOTES
1. Have you been to the ER, urgent care clinic since your last visit? Hospitalized since your last visit? Yes When: 11/8/20 Where: [facility] Reason for visit: fall    2. Have you seen or consulted any other health care providers outside of the [address] [place] [address] since your last visit? Include any pap smears or colon screening. No     3. Since your last visit, have you had any of the following symptoms? shortness of breath. 4.  Have you had any blood work, X-rays or cardiac testing? Yes, November, Cardiology          5. Where do you normally have your labs drawn? [facility]    6. Do you need any refills today?    yes

## 2021-01-28 ENCOUNTER — OFFICE VISIT (OUTPATIENT)
Dept: ORTHOPEDIC SURGERY | Age: 71
End: 2021-01-28
Payer: MEDICARE

## 2021-01-28 VITALS
SYSTOLIC BLOOD PRESSURE: 158 MMHG | TEMPERATURE: 97.2 F | BODY MASS INDEX: 41.15 KG/M2 | DIASTOLIC BLOOD PRESSURE: 79 MMHG | OXYGEN SATURATION: 98 % | HEIGHT: 64 IN | RESPIRATION RATE: 14 BRPM | WEIGHT: 241 LBS | HEART RATE: 72 BPM

## 2021-01-28 DIAGNOSIS — M25.561 CHRONIC PAIN OF RIGHT KNEE: ICD-10-CM

## 2021-01-28 DIAGNOSIS — G89.29 CHRONIC PAIN OF LEFT KNEE: ICD-10-CM

## 2021-01-28 DIAGNOSIS — G89.29 CHRONIC PAIN OF RIGHT KNEE: ICD-10-CM

## 2021-01-28 DIAGNOSIS — M25.562 CHRONIC PAIN OF LEFT KNEE: ICD-10-CM

## 2021-01-28 DIAGNOSIS — M17.12 PRIMARY OSTEOARTHRITIS OF LEFT KNEE: Primary | ICD-10-CM

## 2021-01-28 DIAGNOSIS — M17.11 PRIMARY OSTEOARTHRITIS OF RIGHT KNEE: ICD-10-CM

## 2021-01-28 PROCEDURE — 99213 OFFICE O/P EST LOW 20 MIN: CPT | Performed by: SPECIALIST

## 2021-01-28 PROCEDURE — 1100F PTFALLS ASSESS-DOCD GE2>/YR: CPT | Performed by: SPECIALIST

## 2021-01-28 PROCEDURE — G8432 DEP SCR NOT DOC, RNG: HCPCS | Performed by: SPECIALIST

## 2021-01-28 PROCEDURE — 3288F FALL RISK ASSESSMENT DOCD: CPT | Performed by: SPECIALIST

## 2021-01-28 PROCEDURE — 20610 DRAIN/INJ JOINT/BURSA W/O US: CPT | Performed by: SPECIALIST

## 2021-01-28 PROCEDURE — 1090F PRES/ABSN URINE INCON ASSESS: CPT | Performed by: SPECIALIST

## 2021-01-28 PROCEDURE — G8399 PT W/DXA RESULTS DOCUMENT: HCPCS | Performed by: SPECIALIST

## 2021-01-28 PROCEDURE — G8427 DOCREV CUR MEDS BY ELIG CLIN: HCPCS | Performed by: SPECIALIST

## 2021-01-28 PROCEDURE — G8536 NO DOC ELDER MAL SCRN: HCPCS | Performed by: SPECIALIST

## 2021-01-28 PROCEDURE — G8417 CALC BMI ABV UP PARAM F/U: HCPCS | Performed by: SPECIALIST

## 2021-01-28 PROCEDURE — G8754 DIAS BP LESS 90: HCPCS | Performed by: SPECIALIST

## 2021-01-28 PROCEDURE — G8753 SYS BP > OR = 140: HCPCS | Performed by: SPECIALIST

## 2021-01-28 PROCEDURE — 3017F COLORECTAL CA SCREEN DOC REV: CPT | Performed by: SPECIALIST

## 2021-01-28 PROCEDURE — G9899 SCRN MAM PERF RSLTS DOC: HCPCS | Performed by: SPECIALIST

## 2021-01-28 RX ORDER — BETAMETHASONE SODIUM PHOSPHATE AND BETAMETHASONE ACETATE 3; 3 MG/ML; MG/ML
6 INJECTION, SUSPENSION INTRA-ARTICULAR; INTRALESIONAL; INTRAMUSCULAR; SOFT TISSUE ONCE
Status: COMPLETED | OUTPATIENT
Start: 2021-01-28 | End: 2021-01-28

## 2021-01-28 RX ADMIN — BETAMETHASONE SODIUM PHOSPHATE AND BETAMETHASONE ACETATE 6 MG: 3; 3 INJECTION, SUSPENSION INTRA-ARTICULAR; INTRALESIONAL; INTRAMUSCULAR; SOFT TISSUE at 15:32

## 2021-01-28 NOTE — PATIENT INSTRUCTIONS
Knee Arthritis: Exercises  Introduction  Here are some examples of exercises for you to try. The exercises may be suggested for a condition or for rehabilitation. Start each exercise slowly. Ease off the exercises if you start to have pain. You will be told when to start these exercises and which ones will work best for you. How to do the exercises  Knee flexion with heel slide   1. Lie on your back with your knees bent. 2. Slide your heel back by bending your affected knee as far as you can. Then hook your other foot around your ankle to help pull your heel even farther back. 3. Hold for about 6 seconds, then rest for up to 10 seconds. 4. Repeat 8 to 12 times. 5. Switch legs and repeat steps 1 through 4, even if only one knee is sore. Quad sets   1. Sit with your affected leg straight and supported on the floor or a firm bed. Place a small, rolled-up towel under your knee. Your other leg should be bent, with that foot flat on the floor. 2. Tighten the thigh muscles of your affected leg by pressing the back of your knee down into the towel. 3. Hold for about 6 seconds, then rest for up to 10 seconds. 4. Repeat 8 to 12 times. 5. Switch legs and repeat steps 1 through 4, even if only one knee is sore. Straight-leg raises to the front   1. Lie on your back with your good knee bent so that your foot rests flat on the floor. Your affected leg should be straight. Make sure that your low back has a normal curve. You should be able to slip your hand in between the floor and the small of your back, with your palm touching the floor and your back touching the back of your hand. 2. Tighten the thigh muscles in your affected leg by pressing the back of your knee flat down to the floor. Hold your knee straight. 3. Keeping the thigh muscles tight and your leg straight, lift your affected leg up so that your heel is about 12 inches off the floor. Hold for about 6 seconds, then lower slowly.   4. Relax for up to 10 seconds between repetitions. 5. Repeat 8 to 12 times. 6. Switch legs and repeat steps 1 through 5, even if only one knee is sore. Active knee flexion   1. Lie on your stomach with your knees straight. If your kneecap is uncomfortable, roll up a washcloth and put it under your leg just above your kneecap. 2. Lift the foot of your affected leg by bending the knee so that you bring the foot up toward your buttock. If this motion hurts, try it without bending your knee quite as far. This may help you avoid any painful motion. 3. Slowly move your leg up and down. 4. Repeat 8 to 12 times. 5. Switch legs and repeat steps 1 through 4, even if only one knee is sore. Quadriceps stretch (facedown)   1. Lie flat on your stomach, and rest your face on the floor. 2. Wrap a towel or belt strap around the lower part of your affected leg. Then use the towel or belt strap to slowly pull your heel toward your buttock until you feel a stretch. 3. Hold for about 15 to 30 seconds, then relax your leg against the towel or belt strap. 4. Repeat 2 to 4 times. 5. Switch legs and repeat steps 1 through 4, even if only one knee is sore. Stationary exercise bike   1. If you do not have a stationary exercise bike at home, you can find one to ride at your local health club or community center. 2. Adjust the height of the bike seat so that your knee is slightly bent when your leg is extended downward. If your knee hurts when the pedal reaches the top, you can raise the seat so that your knee does not bend as much. 3. Start slowly. At first, try to do 5 to 10 minutes of cycling with little to no resistance. Then increase your time and the resistance bit by bit until you can do 20 to 30 minutes without pain. 4. If you start to have pain, rest your knee until your pain gets back to the level that is normal for you. Or cycle for less time or with less effort. Follow-up care is a key part of your treatment and safety.  Be sure to make and go to all appointments, and call your doctor if you are having problems. It's also a good idea to know your test results and keep a list of the medicines you take. Where can you learn more? Go to http://www.alonso.com/  Enter C159 in the search box to learn more about \"Knee Arthritis: Exercises. \"  Current as of: March 2, 2020               Content Version: 12.6  © 2006-2020 [employer]. Care instructions adapted under license by [employer] (which disclaims liability or warranty for this information). If you have questions about a medical condition or this instruction, always ask your healthcare professional. [address] any warranty or liability for your use of this information.

## 2021-01-28 NOTE — PROGRESS NOTES
Patient: Benjamín Whitehead                MRN: [id number]       SSN: [id number]  YOB: 1950        AGE: 78 y.o. SEX: female      PCP: Samuel Topete MD  21    CC: BILATERAL KNEE PAIN    HISTORY:  Benjamín Whitehead is a 78 y.o. female who is seen for increased bilateral knee pain. She has been feeling increased knee pain since her fall on 20. She notes pain when bending to water her flowers with a watering can. She even has difficulty putting on her pants. She completed a successful Synvisc series on 20. She was previously seen for left elbow pain. She fell on 20 while she was walking through her house. She landed on her knee and struck her left elbow. She has had increased pain since. She has difficulty straightening out her elbow. She has been experiencing left elbow pain for the past 3 months. She has pain when lifting and gripping objects. Pain Assessment  2021   Location of Pain Knee   Location Modifiers Left   Severity of Pain 10   Quality of Pain Aching; Throbbing   Quality of Pain Comment -   Duration of Pain Persistent   Frequency of Pain Constant   Aggravating Factors Standing;Walking;Stairs; Bending   Aggravating Factors Comment -   Limiting Behavior No   Relieving Factors Nothing   Result of Injury No   Work-Related Injury -   Type of Injury -   Type of Injury Comment -     Occupation, etc:  Ms. Yousif Herrera receives [address] [facility] [address] for COPD and knee arthritis. She moved from [place] to [facility] with her  when her mother  several years ago. She moved from [facility] [place] to [place] 10 years ago. She and her  live in [place]. She has 3 sons--1 in [place] and 2 in [place] and 13 grandchildren. She has 2 Chihuahua-Poodle mix dogs--Frisky and Boots. Sueellen Lock and Boots are 1 yo sisters. Her weight fluctuates. She does not exercise regularly. She is an insulin-controlled diabetic. She is 241 pounds and 5'4\" tall.     Last 3 Recorded Weights in this Encounter    21 1427   Weight: 241 lb (109.3 kg)     Body mass index is 41.37 kg/m². REVIEW OF SYSTEMS: All Below are Negative except: See HPI     Constitutional: negative for fever, chills, and weight loss. Cardiovascular: negative for chest pain, claudication, leg swelling, SOB, PIERSON   Gastrointestinal: Negative for pain, N/V/C/D, Blood in stool or urine, dysuria,  hematuria, incontinence, pelvic pain. Musculoskeletal: See HPI   Neurological: Negative for dizziness and weakness. Negative for headaches, Visual changes, confusion, seizures   Phychiatric/Behavioral: Negative for depression, memory loss, substance  abuse. Extremities: Negative for hair changes, rash, or skin lesion changes. Hematologic: Negative for bleeding problems, bruising, pallor or swollen lymph  nodes   Peripheral Vascular: No calf pain, no circulation deficits.     Social History     Socioeconomic History    Marital status: LEGALLY      Spouse name: Not on file    Number of children: 3    Years of education: Not on file    Highest education level: Not on file   Occupational History    Occupation: retired   Social Needs    Financial resource strain: Not on file    Food insecurity     Worry: Not on file     Inability: Not on file   [employer] needs     Medical: Not on file     Non-medical: Not on file   Tobacco Use    Smoking status: Former Smoker     Packs/day: 0.25     Years: 48.00     Pack years: 12.00     Types: Cigarettes     Quit date: 2018     Years since quittin.6    Smokeless tobacco: Never Used   Substance and Sexual Activity    Alcohol use: No     Alcohol/week: 0.0 standard drinks    Drug use: No    Sexual activity: Yes     Partners: Male   Lifestyle    Physical activity     Days per week: Not on file     Minutes per session: Not on file    Stress: Not on file   Relationships    Social connections     Talks on phone: Not on file     Gets together: Not on file     Attends Zoroastrianism service: Not on file     Active member of club or organization: Not on file     Attends meetings of clubs or organizations: Not on file     Relationship status: Not on file    Intimate partner violence     Fear of current or ex partner: Not on file     Emotionally abused: Not on file     Physically abused: Not on file     Forced sexual activity: Not on file   Other Topics Concern     Service No    Blood Transfusions No    Caffeine Concern No    Occupational Exposure No    Hobby Hazards No    Sleep Concern No    Stress Concern No    Weight Concern No    Special Diet No    Back Care No    Exercise No    Bike Helmet No    Seat Belt Yes    Self-Exams Yes   Social History Narrative    Not on file        No Known Allergies     Current Outpatient Medications   Medication Sig    atorvastatin (LIPITOR) 80 mg tablet Take 1 Tab by mouth daily.  carvediloL (COREG) 25 mg tablet Take 1 Tab by mouth two (2) times daily (with meals).  sacubitriL-valsartan (Entresto)  mg tablet Take 1 Tab by mouth two (2) times a day.  furosemide (LASIX) 40 mg tablet Take 1 Tab by mouth two (2) times daily as needed (edema).  tiZANidine (ZANAFLEX) 4 mg tablet Take 4 mg by mouth two (2) times a day.  lidocaine (Lidoderm) 5 % Apply patch to the affected area for 12 hours a day and remove for 12 hours a day.  pantoprazole (PROTONIX) 40 mg tablet Take 1 Tab by mouth daily.  albuterol (PROVENTIL HFA, VENTOLIN HFA, PROAIR HFA) 90 mcg/actuation inhaler Take 2 Puffs by inhalation every four (4) hours as needed for Wheezing.  aspirin 81 mg chewable tablet Take 2 Tabs by mouth daily.  insulin detemir (LEVEMIR) 100 unit/mL injection 15 units daily for diabetes    desloratadine (CLARINEX) 5 mg tablet Take 1 Tab by mouth daily as needed for Allergies.  mometasone (NASONEX) 50 mcg/actuation nasal spray 2 Sprays by Both Nostrils route daily as needed.  For allergies    umeclidinium-vilanterol (ANORO ELLIPTA) 62.5-25 mcg/actuation inhaler Take 1 Puff by inhalation daily. For COPD     No current facility-administered medications for this visit. PHYSICAL EXAMINATION:  Visit Vitals  BP (!) 158/79 (BP 1 Location: Left lower arm)   Pulse 72   Temp 97.2 °F (36.2 °C) (Temporal)   Resp 14   Ht 5' 4\" (1.626 m)   Wt 241 lb (109.3 kg)   SpO2 98%   BMI 41.37 kg/m²      ORTHO EXAMINATION:  Examination Left knee Right knee   Skin Intact, childhood scars Intact, childhood scars   Range of motion 110-0 110-0   Effusion - +   Medial joint line tenderness + +   Lateral joint line tenderness - -   Popliteal tenderness - -   Osteophytes palpable + +   Mayurs - -   Patella crepitus + +   Anterior drawer - -   Lateral laxity - -   Medial laxity - -   Varus deformity - -   Valgus deformity - -   Pretibial edema 3+ 3+   Calf tenderness - -   Pain over L tibial tubercle     Chart reviewed for the following:   Kevin Panda MD, have reviewed the History, Physical and updated the Allergic reactions for [address] [facility] [address] performed immediately prior to start of procedure:  Kevin Panda MD, have performed the following reviews on [employer] prior to the start of the procedure:            * Patient was identified by name and date of birth   * Agreement on procedure being performed was verified  * Risks and Benefits explained to the patient  * Procedure site verified and marked as necessary  * Patient was positioned for comfort  * Consent was obtained     Time: 3:18 PM     Date of procedure: 1/28/2021    Procedure performed by:  Diya Guevara MD    Ms. Yin tolerated the procedure well with no complications. RADIOGRAPHS:  XR LEFT KNEE 10/13/20 [facility]  IMPRESSION:  Three views - No fractures, no effusion, moderately severe bilateral joint space narrowing, + osteophytes present.  Kellgren Dariel grade 3    XR LEFT ELBOW 10/13/20 [facility]  IMPRESSION:  Two views - No fractures, negative fat pad sign, no joint space narrowing. XR BILAT KNEE 1/16/20 [facility]  IMPRESSION:  Three views - No fractures, no effusion, moderate medial joint space narrowing, + osteophytes present. Kellgren Dariel grade 2     IMPRESSION:      ICD-10-CM ICD-9-CM    1. Primary osteoarthritis of left knee  M17.12 715.16 betamethasone (CELESTONE) injection 6 mg      DRAIN/INJECT LARGE JOINT/BURSA      PROCEDURE AUTHORIZATION TO    2. Chronic pain of left knee  M25.562 719.46 betamethasone (CELESTONE) injection 6 mg    G89.29 338.29 DRAIN/INJECT LARGE JOINT/BURSA      PROCEDURE AUTHORIZATION TO    3. Primary osteoarthritis of right knee  M17.11 715.16 betamethasone (CELESTONE) injection 6 mg      DRAIN/INJECT LARGE JOINT/BURSA      PROCEDURE AUTHORIZATION TO    4. Chronic pain of right knee  M25.561 719.46 betamethasone (CELESTONE) injection 6 mg    G89.29 338.29 DRAIN/INJECT LARGE JOINT/BURSA      PROCEDURE AUTHORIZATION TO      PLAN: Consider visco supplementation if pain continues. After discussing treatment options, patient's knees were injected with 4 cc Marcaine and 1/2 cc Celestone. Dietary counseling provided today. Start weight loss with low carb diet and intermittent fasting. There is no need for surgery at this time. She will follow up as needed.     Scribed by Romayne Beams  ([address]) as dictated by Bright Sewell MD

## 2021-02-02 ENCOUNTER — TELEPHONE (OUTPATIENT)
Dept: ORTHOPEDIC SURGERY | Age: 71
End: 2021-02-02

## 2021-02-02 DIAGNOSIS — M17.12 PRIMARY OSTEOARTHRITIS OF LEFT KNEE: Primary | ICD-10-CM

## 2021-02-02 DIAGNOSIS — M17.12 LOCALIZED OSTEOARTHRITIS OF LEFT KNEE: ICD-10-CM

## 2021-02-02 NOTE — TELEPHONE ENCOUNTER
Pt has [facility] VADSNP their preferred drug is Synvisc, can a new order be placed o does patient have to get Euflexxa?

## 2021-03-10 DIAGNOSIS — M17.12 LOCALIZED OSTEOARTHRITIS OF LEFT KNEE: ICD-10-CM

## 2021-03-10 DIAGNOSIS — M17.12 PRIMARY OSTEOARTHRITIS OF LEFT KNEE: ICD-10-CM

## 2021-03-10 NOTE — TELEPHONE ENCOUNTER
Patient called to schedule her injections but no order for Synvisc has been placed yet. Can the order be placed please?

## 2021-03-17 ENCOUNTER — HOSPITAL ENCOUNTER (INPATIENT)
Age: 71
LOS: 5 days | Discharge: HOME HEALTH | DRG: 871 | End: 2021-03-23
Attending: EMERGENCY MEDICINE | Admitting: INTERNAL MEDICINE
Payer: MEDICARE

## 2021-03-17 DIAGNOSIS — R65.21 SEPSIS WITH ACUTE RENAL FAILURE AND SEPTIC SHOCK, DUE TO UNSPECIFIED ORGANISM, UNSPECIFIED ACUTE RENAL FAILURE TYPE (HCC): Primary | ICD-10-CM

## 2021-03-17 DIAGNOSIS — N17.9 SEPSIS WITH ACUTE RENAL FAILURE AND SEPTIC SHOCK, DUE TO UNSPECIFIED ORGANISM, UNSPECIFIED ACUTE RENAL FAILURE TYPE (HCC): Primary | ICD-10-CM

## 2021-03-17 DIAGNOSIS — A41.9 SEPSIS WITH ACUTE RENAL FAILURE AND SEPTIC SHOCK, DUE TO UNSPECIFIED ORGANISM, UNSPECIFIED ACUTE RENAL FAILURE TYPE (HCC): Primary | ICD-10-CM

## 2021-03-17 PROCEDURE — 51702 INSERT TEMP BLADDER CATH: CPT

## 2021-03-17 PROCEDURE — 75810000455 HC PLCMT CENT VENOUS CATH LVL 2 5182

## 2021-03-17 PROCEDURE — 99291 CRITICAL CARE FIRST HOUR: CPT

## 2021-03-17 PROCEDURE — 96365 THER/PROPH/DIAG IV INF INIT: CPT

## 2021-03-17 PROCEDURE — 75810000133 HC LUMBAR PUNCTURE

## 2021-03-17 NOTE — Clinical Note
Status[de-identified] INPATIENT [101]   Type of Bed: Intensive Care [6]   Inpatient Hospitalization Certified Necessary for the Following Reasons: 4.  Patient requires ICU level of care interventions (further clarification in H&P documentation)   Admitting Diagnosis: Sepsis [facility]) [[id number]]   Admitting Physician: Jose Enrique Gandhi [[id number]]   Attending Physician: Jose Enrique Gandhi [[id number]]   Estimated Length of Stay: 2 Midnights   Discharge Plan[de-identified] [facility]

## 2021-03-18 ENCOUNTER — APPOINTMENT (OUTPATIENT)
Dept: CT IMAGING | Age: 71
DRG: 871 | End: 2021-03-18
Attending: EMERGENCY MEDICINE
Payer: MEDICARE

## 2021-03-18 ENCOUNTER — APPOINTMENT (OUTPATIENT)
Dept: GENERAL RADIOLOGY | Age: 71
DRG: 871 | End: 2021-03-18
Attending: EMERGENCY MEDICINE
Payer: MEDICARE

## 2021-03-18 PROBLEM — A41.9 SEPSIS (HCC): Status: ACTIVE | Noted: 2021-03-18

## 2021-03-18 PROBLEM — N17.9 AKI (ACUTE KIDNEY INJURY) (HCC): Status: ACTIVE | Noted: 2021-03-18

## 2021-03-18 LAB
ALBUMIN SERPL-MCNC: 2.7 G/DL (ref 3.4–5)
ALBUMIN/GLOB SERPL: 0.8 {RATIO} (ref 0.8–1.7)
ALP SERPL-CCNC: 147 U/L (ref 45–117)
ALT SERPL-CCNC: 83 U/L (ref 13–56)
ANION GAP SERPL CALC-SCNC: 11 MMOL/L (ref 3–18)
ANION GAP SERPL CALC-SCNC: 12 MMOL/L (ref 3–18)
APPEARANCE CSF: CLEAR
APPEARANCE UR: ABNORMAL
ARTERIAL PATENCY WRIST A: YES
AST SERPL-CCNC: 45 U/L (ref 10–38)
ATRIAL RATE: 91 BPM
B PERT DNA SPEC QL NAA+PROBE: NOT DETECTED
BACTERIA URNS QL MICRO: ABNORMAL /HPF
BASE DEFICIT BLD-SCNC: 8 MMOL/L
BASOPHILS # BLD: 0 K/UL (ref 0–0.06)
BASOPHILS # BLD: 0 K/UL (ref 0–0.06)
BASOPHILS NFR BLD: 0 % (ref 0–3)
BASOPHILS NFR BLD: 0 % (ref 0–3)
BDY SITE: ABNORMAL
BILIRUB SERPL-MCNC: 0.7 MG/DL (ref 0.2–1)
BILIRUB UR QL: ABNORMAL
BNP SERPL-MCNC: 1020 PG/ML (ref 0–900)
BODY TEMPERATURE: 100.1
BORDETELLA PARAPERTUSSIS PCR, BORPAR: NOT DETECTED
BUN SERPL-MCNC: 34 MG/DL (ref 7–18)
BUN SERPL-MCNC: 46 MG/DL (ref 7–18)
BUN/CREAT SERPL: 6 (ref 12–20)
BUN/CREAT SERPL: 9 (ref 12–20)
C PNEUM DNA SPEC QL NAA+PROBE: NOT DETECTED
CALCIUM SERPL-MCNC: 7.9 MG/DL (ref 8.5–10.1)
CALCIUM SERPL-MCNC: 8 MG/DL (ref 8.5–10.1)
CALCULATED P AXIS, ECG09: 79 DEGREES
CALCULATED R AXIS, ECG10: 56 DEGREES
CALCULATED T AXIS, ECG11: 64 DEGREES
CHLORIDE SERPL-SCNC: 104 MMOL/L (ref 100–111)
CHLORIDE SERPL-SCNC: 106 MMOL/L (ref 100–111)
CO2 SERPL-SCNC: 20 MMOL/L (ref 21–32)
CO2 SERPL-SCNC: 21 MMOL/L (ref 21–32)
COLOR CSF: CLEAR
COLOR SPUN CSF: CLEAR
COLOR UR: ABNORMAL
COVID-19 RAPID TEST, COVR: NOT DETECTED
CREAT SERPL-MCNC: 5.23 MG/DL (ref 0.6–1.3)
CREAT SERPL-MCNC: 5.53 MG/DL (ref 0.6–1.3)
CRP SERPL-MCNC: 8.2 MG/DL (ref 0–0.3)
CRYPTOC AG CSF QL IA: NEGATIVE
DIAGNOSIS, 93000: NORMAL
DIFFERENTIAL METHOD BLD: ABNORMAL
DIFFERENTIAL METHOD BLD: ABNORMAL
EOSINOPHIL # BLD: 0 K/UL (ref 0–0.4)
EOSINOPHIL # BLD: 0.1 K/UL (ref 0–0.4)
EOSINOPHIL NFR BLD: 0 % (ref 0–5)
EOSINOPHIL NFR BLD: 2 % (ref 0–5)
ERYTHROCYTE [DISTWIDTH] IN BLOOD BY AUTOMATED COUNT: 15.7 % (ref 11.6–14.5)
ERYTHROCYTE [DISTWIDTH] IN BLOOD BY AUTOMATED COUNT: 16.2 % (ref 11.6–14.5)
EST. AVERAGE GLUCOSE BLD GHB EST-MCNC: 171 MG/DL
FLUAV H1 2009 PAND RNA SPEC QL NAA+PROBE: NOT DETECTED
FLUAV H1 RNA SPEC QL NAA+PROBE: NOT DETECTED
FLUAV H3 RNA SPEC QL NAA+PROBE: NOT DETECTED
FLUAV SUBTYP SPEC NAA+PROBE: NOT DETECTED
FLUBV RNA SPEC QL NAA+PROBE: NOT DETECTED
GAS FLOW.O2 O2 DELIVERY SYS: ABNORMAL L/MIN
GAS FLOW.O2 SETTING OXYMISER: 3 L/M
GLOBULIN SER CALC-MCNC: 3.3 G/DL (ref 2–4)
GLUCOSE BLD STRIP.AUTO-MCNC: 239 MG/DL (ref 70–110)
GLUCOSE BLD STRIP.AUTO-MCNC: 247 MG/DL (ref 70–110)
GLUCOSE BLD STRIP.AUTO-MCNC: 259 MG/DL (ref 70–110)
GLUCOSE BLD STRIP.AUTO-MCNC: 276 MG/DL (ref 70–110)
GLUCOSE CSF-MCNC: 88 MG/DL (ref 40–70)
GLUCOSE SERPL-MCNC: 171 MG/DL (ref 74–99)
GLUCOSE SERPL-MCNC: 280 MG/DL (ref 74–99)
GLUCOSE UR STRIP.AUTO-MCNC: NEGATIVE MG/DL
HADV DNA SPEC QL NAA+PROBE: NOT DETECTED
HBA1C MFR BLD: 7.6 % (ref 4.2–5.6)
HCO3 BLD-SCNC: 16.6 MMOL/L (ref 22–26)
HCOV 229E RNA SPEC QL NAA+PROBE: NOT DETECTED
HCOV HKU1 RNA SPEC QL NAA+PROBE: NOT DETECTED
HCOV NL63 RNA SPEC QL NAA+PROBE: NOT DETECTED
HCOV OC43 RNA SPEC QL NAA+PROBE: NOT DETECTED
HCT VFR BLD AUTO: 32.3 % (ref 35–45)
HCT VFR BLD AUTO: 39.2 % (ref 35–45)
HGB BLD-MCNC: 10 G/DL (ref 12–16)
HGB BLD-MCNC: 12.3 G/DL (ref 12–16)
HGB UR QL STRIP: NEGATIVE
HMPV RNA SPEC QL NAA+PROBE: NOT DETECTED
HPIV1 RNA SPEC QL NAA+PROBE: NOT DETECTED
HPIV2 RNA SPEC QL NAA+PROBE: NOT DETECTED
HPIV3 RNA SPEC QL NAA+PROBE: NOT DETECTED
HPIV4 RNA SPEC QL NAA+PROBE: NOT DETECTED
KETONES UR QL STRIP.AUTO: ABNORMAL MG/DL
LACTATE BLD-SCNC: 1.6 MMOL/L (ref 0.4–2)
LEUKOCYTE ESTERASE UR QL STRIP.AUTO: ABNORMAL
LYMPHOCYTES # BLD: 0.8 K/UL (ref 0.8–3.5)
LYMPHOCYTES # BLD: 1.8 K/UL (ref 0.8–3.5)
LYMPHOCYTES NFR BLD: 11 % (ref 20–51)
LYMPHOCYTES NFR BLD: 26 % (ref 20–51)
M PNEUMO DNA SPEC QL NAA+PROBE: NOT DETECTED
MCH RBC QN AUTO: 27.5 PG (ref 24–34)
MCH RBC QN AUTO: 27.8 PG (ref 24–34)
MCHC RBC AUTO-ENTMCNC: 31 G/DL (ref 31–37)
MCHC RBC AUTO-ENTMCNC: 31.4 G/DL (ref 31–37)
MCV RBC AUTO: 88.5 FL (ref 74–97)
MCV RBC AUTO: 89 FL (ref 74–97)
MONOCYTES # BLD: 0.4 K/UL (ref 0–1)
MONOCYTES # BLD: 0.6 K/UL (ref 0–1)
MONOCYTES NFR BLD: 6 % (ref 2–9)
MONOCYTES NFR BLD: 8 % (ref 2–9)
NEUTS SEG # BLD: 4.7 K/UL (ref 1.8–8)
NEUTS SEG # BLD: 5.9 K/UL (ref 1.8–8)
NEUTS SEG NFR BLD: 66 % (ref 42–75)
NEUTS SEG NFR BLD: 81 % (ref 42–75)
NITRITE UR QL STRIP.AUTO: NEGATIVE
P-R INTERVAL, ECG05: 160 MS
PCO2 BLD: 30.4 MMHG (ref 35–45)
PH BLD: 7.35 [PH] (ref 7.35–7.45)
PH UR STRIP: 5 [PH] (ref 5–8)
PLATELET # BLD AUTO: 209 K/UL (ref 135–420)
PLATELET # BLD AUTO: 237 K/UL (ref 135–420)
PLATELET COMMENTS,PCOM: ABNORMAL
PLATELET COMMENTS,PCOM: ABNORMAL
PMV BLD AUTO: 10.2 FL (ref 9.2–11.8)
PMV BLD AUTO: 9.7 FL (ref 9.2–11.8)
PO2 BLD: 144 MMHG (ref 80–100)
POTASSIUM SERPL-SCNC: 3.9 MMOL/L (ref 3.5–5.5)
POTASSIUM SERPL-SCNC: 4.2 MMOL/L (ref 3.5–5.5)
PROCALCITONIN SERPL-MCNC: 1.54 NG/ML
PROT CSF-MCNC: 45 MG/DL (ref 15–45)
PROT SERPL-MCNC: 6 G/DL (ref 6.4–8.2)
PROT UR STRIP-MCNC: >1000 MG/DL
Q-T INTERVAL, ECG07: 366 MS
QRS DURATION, ECG06: 76 MS
QTC CALCULATION (BEZET), ECG08: 450 MS
RBC # BLD AUTO: 3.63 M/UL (ref 4.2–5.3)
RBC # BLD AUTO: 4.43 M/UL (ref 4.2–5.3)
RBC # CSF: 0 /CU MM
RBC #/AREA URNS HPF: ABNORMAL /HPF (ref 0–5)
RBC MORPH BLD: ABNORMAL
RBC MORPH BLD: ABNORMAL
RSV RNA SPEC QL NAA+PROBE: NOT DETECTED
RV+EV RNA SPEC QL NAA+PROBE: NOT DETECTED
SAO2 % BLD: 99 % (ref 92–97)
SARS-COV-2 PCR, COVPCR: NOT DETECTED
SERVICE CMNT-IMP: ABNORMAL
SODIUM SERPL-SCNC: 136 MMOL/L (ref 136–145)
SODIUM SERPL-SCNC: 138 MMOL/L (ref 136–145)
SOURCE, COVRS: NORMAL
SP GR UR REFRACTOMETRY: 1.02 (ref 1–1.03)
SPECIMEN TYPE: ABNORMAL
TUBE # CSF: 1
TUBE # CSF: 1
TUBE # CSF: 3
UROBILINOGEN UR QL STRIP.AUTO: 1 EU/DL (ref 0.2–1)
VANCOMYCIN SERPL-MCNC: 16 UG/ML (ref 5–40)
VENTRICULAR RATE, ECG03: 91 BPM
WBC # BLD AUTO: 7.1 K/UL (ref 4.6–13.2)
WBC # BLD AUTO: 7.2 K/UL (ref 4.6–13.2)
WBC # CSF: 4 /CU MM
WBC URNS QL MICRO: ABNORMAL /HPF (ref 0–4)

## 2021-03-18 PROCEDURE — 86140 C-REACTIVE PROTEIN: CPT

## 2021-03-18 PROCEDURE — 96366 THER/PROPH/DIAG IV INF ADDON: CPT

## 2021-03-18 PROCEDURE — 0202U NFCT DS 22 TRGT SARS-COV-2: CPT

## 2021-03-18 PROCEDURE — 74011000258 HC RX REV CODE- 258: Performed by: EMERGENCY MEDICINE

## 2021-03-18 PROCEDURE — 77030040392 HC DRSG OPTIFOAM MDII -A

## 2021-03-18 PROCEDURE — 74011250637 HC RX REV CODE- 250/637: Performed by: EMERGENCY MEDICINE

## 2021-03-18 PROCEDURE — 87327 CRYPTOCOCCUS NEOFORM AG IA: CPT

## 2021-03-18 PROCEDURE — 89050 BODY FLUID CELL COUNT: CPT

## 2021-03-18 PROCEDURE — 96375 TX/PRO/DX INJ NEW DRUG ADDON: CPT

## 2021-03-18 PROCEDURE — 74011636637 HC RX REV CODE- 636/637: Performed by: EMERGENCY MEDICINE

## 2021-03-18 PROCEDURE — 87040 BLOOD CULTURE FOR BACTERIA: CPT

## 2021-03-18 PROCEDURE — 80202 ASSAY OF VANCOMYCIN: CPT

## 2021-03-18 PROCEDURE — 74011250636 HC RX REV CODE- 250/636: Performed by: EMERGENCY MEDICINE

## 2021-03-18 PROCEDURE — 74011000250 HC RX REV CODE- 250: Performed by: EMERGENCY MEDICINE

## 2021-03-18 PROCEDURE — 96361 HYDRATE IV INFUSION ADD-ON: CPT

## 2021-03-18 PROCEDURE — 82803 BLOOD GASES ANY COMBINATION: CPT

## 2021-03-18 PROCEDURE — 009U3ZX DRAINAGE OF SPINAL CANAL, PERCUTANEOUS APPROACH, DIAGNOSTIC: ICD-10-PCS | Performed by: EMERGENCY MEDICINE

## 2021-03-18 PROCEDURE — 74176 CT ABD & PELVIS W/O CONTRAST: CPT

## 2021-03-18 PROCEDURE — 87015 SPECIMEN INFECT AGNT CONCNTJ: CPT

## 2021-03-18 PROCEDURE — 93005 ELECTROCARDIOGRAM TRACING: CPT

## 2021-03-18 PROCEDURE — 70450 CT HEAD/BRAIN W/O DYE: CPT

## 2021-03-18 PROCEDURE — 74011250637 HC RX REV CODE- 250/637: Performed by: INTERNAL MEDICINE

## 2021-03-18 PROCEDURE — 65660000000 HC RM CCU STEPDOWN

## 2021-03-18 PROCEDURE — 87186 SC STD MICRODIL/AGAR DIL: CPT

## 2021-03-18 PROCEDURE — 84157 ASSAY OF PROTEIN OTHER: CPT

## 2021-03-18 PROCEDURE — 02HV33Z INSERTION OF INFUSION DEVICE INTO SUPERIOR VENA CAVA, PERCUTANEOUS APPROACH: ICD-10-PCS | Performed by: EMERGENCY MEDICINE

## 2021-03-18 PROCEDURE — 84145 PROCALCITONIN (PCT): CPT

## 2021-03-18 PROCEDURE — 87635 SARS-COV-2 COVID-19 AMP PRB: CPT

## 2021-03-18 PROCEDURE — 36600 WITHDRAWAL OF ARTERIAL BLOOD: CPT

## 2021-03-18 PROCEDURE — 83880 ASSAY OF NATRIURETIC PEPTIDE: CPT

## 2021-03-18 PROCEDURE — 87798 DETECT AGENT NOS DNA AMP: CPT

## 2021-03-18 PROCEDURE — 83036 HEMOGLOBIN GLYCOSYLATED A1C: CPT

## 2021-03-18 PROCEDURE — 87077 CULTURE AEROBIC IDENTIFY: CPT

## 2021-03-18 PROCEDURE — 71045 X-RAY EXAM CHEST 1 VIEW: CPT

## 2021-03-18 PROCEDURE — 83605 ASSAY OF LACTIC ACID: CPT

## 2021-03-18 PROCEDURE — 87529 HSV DNA AMP PROBE: CPT

## 2021-03-18 PROCEDURE — 82962 GLUCOSE BLOOD TEST: CPT

## 2021-03-18 PROCEDURE — 99223 1ST HOSP IP/OBS HIGH 75: CPT | Performed by: INTERNAL MEDICINE

## 2021-03-18 PROCEDURE — 96376 TX/PRO/DX INJ SAME DRUG ADON: CPT

## 2021-03-18 PROCEDURE — 87205 SMEAR GRAM STAIN: CPT

## 2021-03-18 PROCEDURE — 96368 THER/DIAG CONCURRENT INF: CPT

## 2021-03-18 PROCEDURE — 87086 URINE CULTURE/COLONY COUNT: CPT

## 2021-03-18 PROCEDURE — 85025 COMPLETE CBC W/AUTO DIFF WBC: CPT

## 2021-03-18 PROCEDURE — 82945 GLUCOSE OTHER FLUID: CPT

## 2021-03-18 PROCEDURE — 2709999900 HC NON-CHARGEABLE SUPPLY

## 2021-03-18 PROCEDURE — 81001 URINALYSIS AUTO W/SCOPE: CPT

## 2021-03-18 PROCEDURE — 96365 THER/PROPH/DIAG IV INF INIT: CPT

## 2021-03-18 PROCEDURE — 80053 COMPREHEN METABOLIC PANEL: CPT

## 2021-03-18 RX ORDER — ACETAMINOPHEN 650 MG/1
650 SUPPOSITORY RECTAL
Status: DISCONTINUED | OUTPATIENT
Start: 2021-03-18 | End: 2021-03-23 | Stop reason: HOSPADM

## 2021-03-18 RX ORDER — ACETAMINOPHEN 325 MG/1
650 TABLET ORAL
Status: COMPLETED | OUTPATIENT
Start: 2021-03-18 | End: 2021-03-18

## 2021-03-18 RX ORDER — LIDOCAINE HYDROCHLORIDE 20 MG/ML
15 SOLUTION OROPHARYNGEAL AS NEEDED
Status: DISCONTINUED | OUTPATIENT
Start: 2021-03-18 | End: 2021-03-23 | Stop reason: HOSPADM

## 2021-03-18 RX ORDER — ALBUTEROL SULFATE 0.83 MG/ML
SOLUTION RESPIRATORY (INHALATION)
Status: DISPENSED
Start: 2021-03-18 | End: 2021-03-18

## 2021-03-18 RX ORDER — INSULIN LISPRO 100 [IU]/ML
INJECTION, SOLUTION INTRAVENOUS; SUBCUTANEOUS
Status: DISCONTINUED | OUTPATIENT
Start: 2021-03-18 | End: 2021-03-23 | Stop reason: HOSPADM

## 2021-03-18 RX ORDER — DIPHENHYDRAMINE HCL 25 MG
25 CAPSULE ORAL
Status: DISCONTINUED | OUTPATIENT
Start: 2021-03-18 | End: 2021-03-23 | Stop reason: HOSPADM

## 2021-03-18 RX ORDER — DIPHENHYDRAMINE HYDROCHLORIDE 50 MG/ML
25 INJECTION, SOLUTION INTRAMUSCULAR; INTRAVENOUS ONCE
Status: COMPLETED | OUTPATIENT
Start: 2021-03-18 | End: 2021-03-18

## 2021-03-18 RX ORDER — NYSTATIN 100000 [USP'U]/ML
500000 SUSPENSION ORAL 4 TIMES DAILY
Status: DISCONTINUED | OUTPATIENT
Start: 2021-03-19 | End: 2021-03-23 | Stop reason: HOSPADM

## 2021-03-18 RX ORDER — DEXTROSE 50 % IN WATER (D50W) INTRAVENOUS SYRINGE
25-50 AS NEEDED
Status: DISCONTINUED | OUTPATIENT
Start: 2021-03-18 | End: 2021-03-23 | Stop reason: HOSPADM

## 2021-03-18 RX ORDER — MAGNESIUM SULFATE 100 %
4 CRYSTALS MISCELLANEOUS AS NEEDED
Status: DISCONTINUED | OUTPATIENT
Start: 2021-03-18 | End: 2021-03-23 | Stop reason: HOSPADM

## 2021-03-18 RX ORDER — FLUCONAZOLE 2 MG/ML
400 INJECTION, SOLUTION INTRAVENOUS ONCE
Status: COMPLETED | OUTPATIENT
Start: 2021-03-18 | End: 2021-03-18

## 2021-03-18 RX ORDER — DIPHENHYDRAMINE HYDROCHLORIDE 50 MG/ML
25 INJECTION, SOLUTION INTRAMUSCULAR; INTRAVENOUS
Status: COMPLETED | OUTPATIENT
Start: 2021-03-18 | End: 2021-03-18

## 2021-03-18 RX ORDER — NYSTATIN 100000 [USP'U]/G
POWDER TOPICAL 2 TIMES DAILY
Status: DISCONTINUED | OUTPATIENT
Start: 2021-03-19 | End: 2021-03-23 | Stop reason: HOSPADM

## 2021-03-18 RX ORDER — ACETAMINOPHEN 325 MG/1
650 TABLET ORAL
Status: DISCONTINUED | OUTPATIENT
Start: 2021-03-18 | End: 2021-03-23 | Stop reason: HOSPADM

## 2021-03-18 RX ORDER — NOREPINEPHRINE BITARTRATE/D5W 8 MG/250ML
2-16 PLASTIC BAG, INJECTION (ML) INTRAVENOUS
Status: DISCONTINUED | OUTPATIENT
Start: 2021-03-18 | End: 2021-03-20

## 2021-03-18 RX ORDER — SODIUM CHLORIDE 0.9 % (FLUSH) 0.9 %
5-10 SYRINGE (ML) INJECTION AS NEEDED
Status: DISCONTINUED | OUTPATIENT
Start: 2021-03-18 | End: 2021-03-23 | Stop reason: HOSPADM

## 2021-03-18 RX ORDER — ALBUTEROL SULFATE 0.83 MG/ML
5 SOLUTION RESPIRATORY (INHALATION)
Status: COMPLETED | OUTPATIENT
Start: 2021-03-18 | End: 2021-03-18

## 2021-03-18 RX ORDER — INSULIN LISPRO 100 [IU]/ML
INJECTION, SOLUTION INTRAVENOUS; SUBCUTANEOUS
Status: DISCONTINUED | OUTPATIENT
Start: 2021-03-18 | End: 2021-03-18

## 2021-03-18 RX ADMIN — SODIUM CHLORIDE 1000 MG: 900 INJECTION, SOLUTION INTRAVENOUS at 05:30

## 2021-03-18 RX ADMIN — PIPERACILLIN SODIUM AND TAZOBACTAM SODIUM 4.5 G: 4; .5 INJECTION, POWDER, LYOPHILIZED, FOR SOLUTION INTRAVENOUS at 13:12

## 2021-03-18 RX ADMIN — ALBUTEROL SULFATE 5 MG: 2.5 SOLUTION RESPIRATORY (INHALATION) at 03:00

## 2021-03-18 RX ADMIN — PIPERACILLIN SODIUM AND TAZOBACTAM SODIUM 4.5 G: 4; .5 INJECTION, POWDER, LYOPHILIZED, FOR SOLUTION INTRAVENOUS at 07:27

## 2021-03-18 RX ADMIN — ACETAMINOPHEN 650 MG: 325 TABLET ORAL at 02:52

## 2021-03-18 RX ADMIN — INSULIN LISPRO 6 UNITS: 100 INJECTION, SOLUTION INTRAVENOUS; SUBCUTANEOUS at 18:23

## 2021-03-18 RX ADMIN — LIDOCAINE HYDROCHLORIDE 15 ML: 20 SOLUTION ORAL; TOPICAL at 14:35

## 2021-03-18 RX ADMIN — METHYLPREDNISOLONE SODIUM SUCCINATE 125 MG: 125 INJECTION, POWDER, FOR SOLUTION INTRAMUSCULAR; INTRAVENOUS at 03:04

## 2021-03-18 RX ADMIN — ACETAMINOPHEN 650 MG: 325 TABLET ORAL at 13:25

## 2021-03-18 RX ADMIN — SODIUM CHLORIDE 1000 MG: 900 INJECTION, SOLUTION INTRAVENOUS at 02:38

## 2021-03-18 RX ADMIN — DIPHENHYDRAMINE HYDROCHLORIDE 25 MG: 50 INJECTION INTRAMUSCULAR; INTRAVENOUS at 19:06

## 2021-03-18 RX ADMIN — SODIUM CHLORIDE, SODIUM LACTATE, POTASSIUM CHLORIDE, AND CALCIUM CHLORIDE 1000 ML: 600; 310; 30; 20 INJECTION, SOLUTION INTRAVENOUS at 02:20

## 2021-03-18 RX ADMIN — Medication 10 MCG/MIN: at 04:03

## 2021-03-18 RX ADMIN — ALUMINUM HYDROXIDE AND MAGNESIUM HYDROXIDE 15 ML: 200; 200 SUSPENSION ORAL at 14:35

## 2021-03-18 RX ADMIN — FLUCONAZOLE 400 MG: 400 INJECTION, SOLUTION INTRAVENOUS at 08:44

## 2021-03-18 RX ADMIN — DIPHENHYDRAMINE HYDROCHLORIDE 25 MG: 25 CAPSULE ORAL at 21:52

## 2021-03-18 RX ADMIN — DIPHENHYDRAMINE HYDROCHLORIDE 25 MG: 50 INJECTION INTRAMUSCULAR; INTRAVENOUS at 07:27

## 2021-03-18 RX ADMIN — VANCOMYCIN HYDROCHLORIDE 1000 MG: 1 INJECTION, POWDER, LYOPHILIZED, FOR SOLUTION INTRAVENOUS at 19:02

## 2021-03-18 RX ADMIN — PIPERACILLIN SODIUM AND TAZOBACTAM SODIUM 4.5 G: 4; .5 INJECTION, POWDER, LYOPHILIZED, FOR SOLUTION INTRAVENOUS at 21:11

## 2021-03-18 RX ADMIN — PIPERACILLIN SODIUM AND TAZOBACTAM SODIUM 4.5 G: 4; .5 INJECTION, POWDER, LYOPHILIZED, FOR SOLUTION INTRAVENOUS at 02:29

## 2021-03-18 RX ADMIN — INSULIN LISPRO 6 UNITS: 100 INJECTION, SOLUTION INTRAVENOUS; SUBCUTANEOUS at 13:14

## 2021-03-18 RX ADMIN — INSULIN LISPRO 4 UNITS: 100 INJECTION, SOLUTION INTRAVENOUS; SUBCUTANEOUS at 21:21

## 2021-03-18 NOTE — ED NOTES
Pt is much more awake, alert, communicative and now reports she has had sore mouth and hurts to swallow. Oral cavity examined by MD and pt has thrush.

## 2021-03-18 NOTE — PROGRESS NOTES
Spoke to Dr. Gretchen Lopez about bed situation at [facility]. Have spoken to [facility] intensivist and CMO about transferring patient to [facility]. They worked on bed availability and likely will accept patient once bed is available. Dr. Gretchen Lopez aware and will await further communication.

## 2021-03-18 NOTE — ED NOTES
Unsuccessful IV sticks x 3 by this nurse x 3 by Tomeka Ochoa and 3x by MD.  MD set up for central line.

## 2021-03-18 NOTE — ED PROVIDER NOTES
EMERGENCY DEPARTMENT HISTORY AND PHYSICAL EXAM    1:27 AM  Date: 3/17/2021  Patient Name: Agata Vargas    History of Presenting Illness     Chief Complaint   Patient presents with    Fever    Fatigue    Cough        History Provided By: Patient and Patient's     HPI: Agata Vargas is a 78 y.o. female with history of multiple medical problems as below including CKD. Patient was brought in by her  for altered mental status. She has been feeling lethargic and had decreased activity over the past 3 days, has not been able to get out of bed. Patient denies chest pain, GI symptoms or cough. Reports that she is always short of breath but this is no different. She states that she took a gabapentin 2 days ago which was prescribed for pain and not been feeling well since. Denies urinary symptoms but is complaining of diffuse itching. Per the  she has been lethargic and not moving for the past 3 days and her gabapentin dose was increased from 300-400. She received her flu vaccine and her first dose of Covid vaccine, is due for her second dose of Covid today    Location:  Severity:  Timing/course: Onset/Duration:     PCP: Malgorzata Valverde MD    Past History     Past Medical History:  Past Medical History:   Diagnosis Date    Abnormal WBC count 5/17/2016    Anemia     Bilateral shoulder pain 9/27/2016    Chondromalacia of both patellae     COPD [facility]chronic obstructive [facility] disease) [facility] 9/2015    mild-mod dz; Dr Mariam Scott    Diabetes [facility]) 2013    Diabetic eye exam ([facility] 2016    Dilated cardiomyopathy ([facility]     Dyslipidemia     Gout     Heart attack ([facility]     Heart attack ([facility]     Heart attack ([facility] 10/18/2019    History of echocardiogram 11/14/2014    Mild LVE. EF 40%. Mild, diffuse hypk. Mild LAE.   Mild MR.      Hypercholesteremia 1/08/14    Hypertension 2000    Noncompliance with medications 2/16/2016    Obesity     Orthostatic hypotension 5/17/2016    Osteoarthritis of both knees     Pain management 2016    Dr. Adeel Zimmerman Popliteal cyst, bilateral      Vitamin D deficiency 10/16/14       Past Surgical History:  Past Surgical History:   Procedure Laterality Date    HX HEART CATHETERIZATION      HX TUBAL LIGATION         Family History:  Family History   Problem Relation Age of Onset    Diabetes Mother     Hypertension Mother     Hypertension Father     Kidney Disease Maternal Aunt 48        Dialysis       Social History:  Social History     Tobacco Use    Smoking status: Former Smoker     Packs/day: 0.25     Years: 48.00     Pack years: 12.00     Types: Cigarettes     Quit date: 2018     Years since quittin.8    Smokeless tobacco: Never Used   Substance Use Topics    Alcohol use: No     Alcohol/week: 0.0 standard drinks    Drug use: No       Allergies:  No Known Allergies    Review of Systems   Review of Systems   Constitutional: Positive for activity change and fatigue. Respiratory: Positive for shortness of breath. All other systems reviewed and are negative. Physical Exam     Patient Vitals for the past 12 hrs:   Temp Pulse Resp BP SpO2   21 0600 -- 83 -- 122/67 98 %   21 0555 98.7 °F (37.1 °C) 86 28 (!) 135/59 97 %   21 0525 -- 85 20 -- 96 %   21 0524 -- -- -- 131/71 --   21 0430 -- 79 (!) 31 (!) 121/56 95 %   21 0422 -- 78 (!) 34 (!) 123/56 97 %   21 0412 -- 83 25 (!) 95/57 94 %   21 0400 -- 83 (!) 32 (!) 83/55 94 %   21 0357 (!) 100.8 °F (38.2 °C) 82 -- -- 95 %   21 0300 -- 87 26 (!) 115/90 100 %   21 0230 -- 91 (!) 36 103/67 100 %   21 0157 -- 89 (!) 33 (!) 83/46 99 %   21 0152 -- 90 (!) 33 (!) 100/51 99 %   21 0148 -- 91 (!) 35 (!) 100/46 99 %   21 0027 (!) 103.1 °F (39.5 °C) 98 24 (!) 84/45 96 %       Physical Exam  Vitals signs and nursing note reviewed. Constitutional:       Appearance: She is ill-appearing.  She is not diaphoretic. HENT:      Head: Normocephalic and atraumatic. Mouth/Throat:      Mouth: Mucous membranes are dry. Eyes:      Extraocular Movements: Extraocular movements intact. Neck:      Musculoskeletal: Neck supple. Cardiovascular:      Rate and Rhythm: Tachycardia present. Pulmonary:      Effort: Tachypnea present. Breath sounds: Rhonchi present. Abdominal:      Palpations: Abdomen is soft. Musculoskeletal:         General: No deformity. Skin:     General: Skin is warm and dry. Findings: Rash present. Rash is macular, papular and purpuric. Rash is not urticarial.   Neurological:      Mental Status: She is oriented to person, place, and time. Psychiatric:         Mood and Affect: Mood normal.         Diagnostic Study Results     Labs -  Recent Results (from the past 12 hour(s))   CBC WITH AUTOMATED DIFF    Collection Time: 03/18/21  1:29 AM   Result Value Ref Range    WBC 7.1 4.6 - 13.2 K/uL    RBC 4.43 4.20 - 5.30 M/uL    HGB 12.3 12.0 - 16.0 g/dL    HCT 39.2 35.0 - 45.0 %    MCV 88.5 74.0 - 97.0 FL    MCH 27.8 24.0 - 34.0 PG    MCHC 31.4 31.0 - 37.0 g/dL    RDW 16.2 (H) 11.6 - 14.5 %    PLATELET 229 539 - 776 K/uL    MPV 10.2 9.2 - 11.8 FL    NEUTROPHILS 66 42 - 75 %    LYMPHOCYTES 26 20 - 51 %    MONOCYTES 8 2 - 9 %    EOSINOPHILS 0 0 - 5 %    BASOPHILS 0 0 - 3 %    ABS. NEUTROPHILS 4.7 1.8 - 8.0 K/UL    ABS. LYMPHOCYTES 1.8 0.8 - 3.5 K/UL    ABS. MONOCYTES 0.6 0 - 1.0 K/UL    ABS. EOSINOPHILS 0.0 0.0 - 0.4 K/UL    ABS.  BASOPHILS 0.0 0.0 - 0.06 K/UL    DF MANUAL      PLATELET COMMENTS ADEQUATE PLATELETS      RBC COMMENTS NORMOCYTIC, NORMOCHROMIC     METABOLIC PANEL, COMPREHENSIVE    Collection Time: 03/18/21  1:29 AM   Result Value Ref Range    Sodium 136 136 - 145 mmol/L    Potassium 4.2 3.5 - 5.5 mmol/L    Chloride 104 100 - 111 mmol/L    CO2 21 21 - 32 mmol/L    Anion gap 11 3.0 - 18 mmol/L    Glucose 171 (H) 74 - 99 mg/dL    BUN 34 (H) 7.0 - 18 MG/DL    Creatinine 5.53 (H) 0.6 - 1.3 MG/DL    BUN/Creatinine ratio 6 (L) 12 - 20      GFR est AA 9 (L) >60 ml/min/1.73m2    GFR est non-AA 8 (L) >60 ml/min/1.73m2    Calcium 8.0 (L) 8.5 - 10.1 MG/DL    Bilirubin, total 0.7 0.2 - 1.0 MG/DL    ALT (SGPT) 83 (H) 13 - 56 U/L    AST (SGOT) 45 (H) 10 - 38 U/L    Alk.  phosphatase 147 (H) 45 - 117 U/L    Protein, total 6.0 (L) 6.4 - 8.2 g/dL    Albumin 2.7 (L) 3.4 - 5.0 g/dL    Globulin 3.3 2.0 - 4.0 g/dL    A-G Ratio 0.8 0.8 - 1.7     PROCALCITONIN    Collection Time: 03/18/21  1:29 AM   Result Value Ref Range    Procalcitonin 1.54 ng/mL   C REACTIVE PROTEIN, QT    Collection Time: 03/18/21  1:29 AM   Result Value Ref Range    C-Reactive protein 8.2 (H) 0 - 0.3 mg/dL   NT-PRO BNP    Collection Time: 03/18/21  1:29 AM   Result Value Ref Range    NT pro-BNP 1,020 (H) 0 - 900 PG/ML   POC LACTIC ACID    Collection Time: 03/18/21  1:38 AM   Result Value Ref Range    Lactic Acid (POC) 1.60 0.40 - 2.00 mmol/L   EKG, 12 LEAD, INITIAL    Collection Time: 03/18/21  2:24 AM   Result Value Ref Range    Ventricular Rate 91 BPM    Atrial Rate 91 BPM    P-R Interval 160 ms    QRS Duration 76 ms    Q-T Interval 366 ms    QTC Calculation (Bezet) 450 ms    Calculated P Axis 79 degrees    Calculated R Axis 56 degrees    Calculated T Axis 64 degrees    Diagnosis       Normal sinus rhythm  Nonspecific T wave abnormality  Abnormal ECG  When compared with ECG of 04-NOV-2020 19:34,  No significant change was found     POC G3    Collection Time: 03/18/21  2:30 AM   Result Value Ref Range    Device: NASAL CANNULA      Flow rate (POC) 3 L/M    pH (POC) 7.35 7.35 - 7.45      pCO2 (POC) 30.4 (L) 35.0 - 45.0 MMHG    pO2 (POC) 144 (H) 80 - 100 MMHG    HCO3 (POC) 16.6 (L) 22 - 26 MMOL/L    sO2 (POC) 99 (H) 92 - 97 %    Base deficit (POC) 8 mmol/L    Allens test (POC) YES      Site LEFT RADIAL      Patient temp. 100.1      Specimen type (POC) ARTERIAL      Performed by [facility]    Collection Time: 03/18/21  2:43 AM   Result Value Ref Range    Color DARK YELLOW      Appearance CLOUDY      Specific gravity 1.024 1.005 - 1.030      pH (UA) 5.0 5.0 - 8.0      Protein >1,000 (A) NEG mg/dL    Glucose Negative NEG mg/dL    Ketone TRACE (A) NEG mg/dL    Bilirubin SMALL (A) NEG      Blood Negative NEG      Urobilinogen 1.0 0.2 - 1.0 EU/dL    Nitrites Negative NEG      Leukocyte Esterase TRACE (A) NEG     COVID-19 RAPID TEST    Collection Time: 03/18/21  2:43 AM   Result Value Ref Range    Specimen source Nasopharyngeal      COVID-19 rapid test Not detected NOTD     URINE MICROSCOPIC ONLY    Collection Time: 03/18/21  2:43 AM   Result Value Ref Range    WBC 0 to 3 0 - 4 /hpf    RBC 0 to 3 0 - 5 /hpf    Bacteria 3+ (A) NEG /hpf       Radiologic Studies -   Ct Head Wo Cont    Result Date: 3/18/2021  1. No acute intracranial pathology. 2. Moderate atrophy and sequela of chronic small vessel ischemic disease. Medical Decision Making     ED Course: Progress Notes, Reevaluation, and Consults:    1:27 AM Initial assessment performed. The patients presenting problems have been discussed, and they/their family are in agreement with the care plan formulated and outlined with them. I have encouraged them to ask questions as they arise throughout their visit. 05:30 AM  I discussed the case with Dr. Queta Betancourt the intensivist.  Given that it is unclear what the source of her sepsis is especially in the absence of a white count. He suggested obtaining a respiratory bio fire, CT scan chest abdomen and pelvis. I had also discussed obtaining an LP given the initial fusion. Patient is very alert right now and denies any headache or neck pain. She reports that she is having pain with swallowing, she had white patchy plaques over her tongue and the back of her throat, likely candidiasis. No erythema or exudate though. There are also no ICU beds at [facility] so I was asked to search elsewhere.   The patient is okay with the transfer whenever she can find a bed.    8:02 AM  Case discussed with Dr. Parth Faust hospitalist at [facility] and he could not accept the patient because they only have 1 could bed available and have too unstable patients on the floor. [facility] on code black. Will contact Dr. Renato Jacome again. Dr. Claudia Caldwell, ICU is beyond capacity and therefore patients boarding in the ER. I talked to Dr. Melquiades Ramos as well as Dr. Nima Seals. Dr. Renato Jacome is going to reach out to the [address] [place] [address] trying to get the patient transferred to [facility]. Will sign out to Dr. Sheryl Zamora pending recommendation from Dr. Nima Seals      Provider Notes (Medical Decision Making): 62-year-old female brought in by her  for altered mental status and lethargy. Patient is well-appearing on exam.  Tachycardic and hypotensive. Satting 95% on room air and appears tachypneic. Sepsis bundle was immediately initiated including broad-spectrum antibiotic as well as testing for COVID-19. Patient had extremely difficult access and I tried multiple times to obtain a ultrasound guided peripheral IV but was unsuccessful. She is profusely hypovolemic. I decided at that point given her hypotension and need for emergent access to place a central line. Patient is lethargic but oriented and able to answer all questions and she gave a verbal consent. She also had multiple falls per the  to obtain a head CT. Procedures: Central Line    Date/Time: 3/18/2021 2:09 AM  Performed by: Walt Alfaro MD  Authorized by: Walt Alfaro MD     Consent:     Consent obtained:  Verbal    Consent given by:  Patient    Risks discussed:  Incorrect placement and infection    Alternatives discussed:  Delayed treatment  Pre-procedure details:     Hand hygiene: Hand hygiene performed prior to insertion      Sterile barrier technique:  All elements of maximal sterile technique followed      Skin preparation:  2% chlorhexidine    Skin preparation agent: Skin preparation agent completely dried prior to procedure    Anesthesia (see MAR for exact dosages): Anesthesia method:  Local infiltration    Local anesthetic:  Lidocaine 1% w/o epi  Procedure details:     Location:  L internal jugular    Site selection rationale:  Better visualized    Patient position:  Trendelenburg    Procedural supplies:  Triple lumen    Landmarks identified: yes      Ultrasound guidance: yes      Sterile ultrasound techniques: Sterile gel and sterile probe covers were used      Number of attempts:  1    Successful placement: yes    Post-procedure details:     Post-procedure:  Dressing applied and line sutured    Assessment:  Blood return through all ports, free fluid flow, no pneumothorax on x-ray and placement verified by x-ray    Patient tolerance of procedure: Tolerated well, no immediate complications    Lumbar Puncture    Date/Time: 3/18/2021 6:32 AM  Performed by: Alie Ly MD  Authorized by: Alie Ly MD     Consent:     Consent obtained:  Written    Consent given by:  Patient    Risks discussed:  Bleeding, headache, infection, pain and repeat procedure    Alternatives discussed:  Delayed treatment and observation  Pre-procedure details:     Procedure purpose:  Diagnostic    Preparation: Patient was prepped and draped in usual sterile fashion    Anesthesia (see MAR for exact dosages): Anesthesia method:  Local infiltration    Local anesthetic:  Lidocaine 1% w/o epi  Procedure details:     Lumbar space:  L4-L5 interspace    Patient position:  Sitting    Needle gauge:  22    Needle type:  Spinal needle - Quincke tip    Needle length (in):  3.5    Ultrasound guidance: no      Number of attempts:  3    Fluid appearance:  Clear and blood-tinged then clearing    Tubes of fluid:  4    Total volume (ml):  5  Post-procedure:     Puncture site:  Adhesive bandage applied and direct pressure applied    Patient tolerance of procedure:   Tolerated well, no immediate complications          Critical Care Time: Upon my evaluation, this patient had a high probability of imminent or life-threatening deterioration due to septic shock, which required my direct attention, intervention, and personal management. I have personally provided 45 minutes of critical care time exclusive of time spent on separately billable procedures. Time includes review of laboratory data, radiology results, discussion with consultants, and monitoring for potential decompensation. Interventions were performed as documented above. Davey Schroeder MD  2:11 AM        Vital Signs-Reviewed the patient's vital signs. Reviewed pt's pulse ox reading. EKG: Interpreted by the EP. Time Interpreted:    Rate:    Rhythm:    Interpretation:   Comparison:     Records Reviewed: Nursing Notes, Old Medical Records and Previous electrocardiograms (Time of Review: 1:27 AM)  -I am the first provider for this patient.  -I reviewed the vital signs, available nursing notes, past medical history, past surgical history, family history and social history.     Current Facility-Administered Medications   Medication Dose Route Frequency Provider Last Rate Last Admin    sodium chloride (NS) flush 5-10 mL  5-10 mL IntraVENous PRN Davey Schroeder MD        piperacillin-tazobactam (ZOSYN) 4.5 g in 0.9% sodium chloride (MBP/ADV) 100 mL MBP  4.5 g IntraVENous Q6H Davey Schroeder MD        acetaminophen (TYLENOL) tablet 650 mg  650 mg Oral Q6H PRN Davey Schroeder MD   650 mg at 03/18/21 0252    Or    acetaminophen (TYLENOL) suppository 650 mg  650 mg Rectal Q6H PRN Davey Schroeder MD        NOREPINephrine (LEVOPHED) 8 mg in 5% dextrose 250mL (32 mcg/mL) infusion  2-16 mcg/min IntraVENous TITRATE Davey Schroeder MD 18.8 mL/hr at 03/18/21 0403 10 mcg/min at 03/18/21 0403    VANCOMYCIN INFORMATION NOTE   Other Rx Dosing/Monitoring Davey Schroeder MD        fluconazole (DIFLUCAN) 400mg/200 mL IVPB (premix)  400 mg IntraVENous ONCE Davey Schroeder MD         Current Outpatient Medications   Medication Sig Dispense Refill    atorvastatin (LIPITOR) 80 mg tablet Take 1 Tab by mouth daily. 90 Tab 3    carvediloL (COREG) 25 mg tablet Take 1 Tab by mouth two (2) times daily (with meals). 180 Tab 3    sacubitriL-valsartan (Entresto)  mg tablet Take 1 Tab by mouth two (2) times a day. 180 Tab 2    furosemide (LASIX) 40 mg tablet Take 1 Tab by mouth two (2) times daily as needed (edema). 180 Tab 1    tiZANidine (ZANAFLEX) 4 mg tablet Take 4 mg by mouth two (2) times a day.  lidocaine (Lidoderm) 5 % Apply patch to the affected area for 12 hours a day and remove for 12 hours a day. 1 Package 0    pantoprazole (PROTONIX) 40 mg tablet Take 1 Tab by mouth daily. 30 Tab 0    albuterol (PROVENTIL HFA, VENTOLIN HFA, PROAIR HFA) 90 mcg/actuation inhaler Take 2 Puffs by inhalation every four (4) hours as needed for Wheezing. 1 Inhaler 0    aspirin 81 mg chewable tablet Take 2 Tabs by mouth daily. 60 Tab 0    insulin detemir (LEVEMIR) 100 unit/mL injection 15 units daily for diabetes 2 Vial 0    desloratadine (CLARINEX) 5 mg tablet Take 1 Tab by mouth daily as needed for Allergies. 30 Tab 0    mometasone (NASONEX) 50 mcg/actuation nasal spray 2 Sprays by Both Nostrils route daily as needed. For allergies 3 Container 3    umeclidinium-vilanterol (ANORO ELLIPTA) 62.5-25 mcg/actuation inhaler Take 1 Puff by inhalation daily. For COPD 3 Inhaler 3        Clinical Impression     Clinical Impression:   1. Sepsis with acute renal failure and septic shock, due to unspecified organism, unspecified acute renal failure type [facility])        Disposition: admit        This note was dictated utilizing voice recognition software which may lead to typographical errors. I apologize in advance if the situation occurs. If questions arise please do not hesitate to contact me or call our department.     Davey Chand MD  1:27 AM

## 2021-03-18 NOTE — ED NOTES
Received report from [facility], [place] [employer]. Assumed care of pt. Pt resting in room at this time.

## 2021-03-18 NOTE — ED NOTES
Report to Iliana Argueta RN. Pt. reports itching and would like something \"It's making me crazy\". She denies any new meds from MD when she had Neurontin dose increase.

## 2021-03-18 NOTE — ED TRIAGE NOTES
Reports she is here for medication reaction \"They put me on 400 mg Percocet. I took one 2 days ago and I can't feel anything\"  Cannot transfer to bed by herself. Weak and slow response with being confused about events leading to admission.

## 2021-03-18 NOTE — ED NOTES
Received report and assumed care of patient. Greeted patient and introduced myself as their primary nurse. Started IV zosyn. Epi drip infusing. Patient is A&O x 4. She complains of itching all over. IV benadryl given. Patient talking on phone with family member.

## 2021-03-18 NOTE — ROUTINE PROCESS
TRANSFER - OUT REPORT:    Verbal report given to [address] [place] [address],Third Floor RN(name) on Marcus Wood  being transferred to 356(unit) for routine progression of care       Report consisted of patients Situation, Background, Assessment and   Recommendations(SBAR). Information from the following report(s) SBAR, ED Summary and Intake/Output was reviewed with the receiving nurse. Lines:   Triple Lumen 7FR, 16CM 03/18/21 Left Internal jugular (Active)       Peripheral IV 03/18/21 Left Hand (Active)        Opportunity for questions and clarification was provided.       Patient transported with:   Monitor   IVx1, Central Linex1

## 2021-03-18 NOTE — ED NOTES
ABG done by NIKOLAS Rodgers RN in left wrist with Janes's test performed and site held for bleeding.  Results to MD

## 2021-03-18 NOTE — ED NOTES
PCR done, Approved for use by Dr. Frank White [facility] dated, time and labeled approved.

## 2021-03-18 NOTE — ED NOTES
Patient transferred to a [facility] bed. Patient was able to stand to get into chair while bed was changed. Patient was steady on her feet. Patient has no complaints. VS stable.

## 2021-03-18 NOTE — ED NOTES
Colonel Cordova reports pt has been in bed x 3 days, and has fallen x 2 in those 3 days. Says pt was started on Gabapentin 400 mg by Dr. Jaycob Iglesias at the [facility] on [facility] in [place] last week.  \"Since since took it she got a lot worse\"

## 2021-03-18 NOTE — PROGRESS NOTES
conducted an initial consultation and Spiritual Assessment for Claudine Aparicio, who is a 78 y.o.,female. Patient's Primary Language is: [place]. According to the patient's EMR [facility] Affiliation is: No preference. The reason the Patient came to the hospital is:   Patient Active Problem List    Diagnosis Date Noted    Sepsis ([address] 03/18/2021    Nonrheumatic mitral valve regurgitation 08/13/2020    Chronic systolic congestive heart failure ([address] 11/07/2019    Pulmonary edema cardiac cause ([address] 10/19/2019    COPD with acute exacerbation ([address] 10/19/2019    Respiratory failure requiring intubation ([address] 10/19/2019    Syncope 10/18/2019    Flash pulmonary edema ([address] 10/18/2019    Elevated d-dimer 10/18/2019    Acute on chronic respiratory failure with hypoxia ([address] 10/18/2019    CAD (coronary artery disease) 10/18/2019    Ischemic cardiomyopathy 09/09/2019    CAP (community acquired pneumonia) 08/26/2019    Combined systolic and diastolic congestive heart failure ([address] 08/26/2019    Type 2 diabetes mellitus with hyperglycemia ([address] 08/26/2019    TESSA on CPAP 08/26/2019    COPD exacerbation ([address] 08/25/2019    Type 2 diabetes with nephropathy ([address] 07/12/2018    Severe obesity (BMI 35.0-39. 9) with comorbidity ([address] 07/12/2018    Chest pain 06/09/2018    SOB (shortness of breath) 06/09/2018    Coronary artery disease involving native coronary artery with unstable angina pectoris ([address] 06/09/2018    Pulmonary edema 06/08/2018    Coronary artery disease involving native coronary artery of native heart without angina pectoris 05/31/2018    STEMI (ST elevation myocardial infarction) ([address] 05/20/2018    Acute pulmonary edema ([address] 05/20/2018    Caregiver stress 04/26/2017    Bilateral shoulder pain 09/27/2016    Elevated alkaline phosphatase level 09/27/2016    Chronic obstructive pulmonary disease ([address] 05/17/2016    Compliance with medication regimen 05/17/2016    Chronic pain of both knees 04/13/2016 Chronic pain syndrome 04/13/2016    Primary osteoarthritis of both knees 04/13/2016    Iron deficiency anemia 10/22/2015    Decreased GFR 10/22/2015    Dyslipidemia, goal LDL below 70 10/22/2015    Former smoker 10/22/2015    Osteoarthritis of both knees     Essential hypertension 09/17/2015    COPD (chronic obstructive pulmonary disease) ([facility] 09/01/2015    Diabetes mellitus type 2, insulin dependent ([facility] 07/02/2015    Environmental allergies 04/01/2015    Cardiomyopathy, dilated ([facility] 12/31/2014        The  provided the following Interventions:  Initiated a relationship of care and support. Listened empathically. Provided chaplaincy education. Offered assurance of continued prayers on patient's behalf. Chart reviewed. The following outcomes where achieved:  Patient expressed gratitude for 's visit. Assessment:  Patient does not have any Sikh/cultural needs that will affect patient's preferences in health care. There are no spiritual or Sikh issues which require intervention at this time. Plan:  Chaplains will continue to follow and will provide pastoral care on an as needed/requested basis.  recommends bedside caregivers page  on duty if patient shows signs of acute spiritual or emotional distress.     [address]   [phone]

## 2021-03-18 NOTE — ED NOTES
Dr. Suleiman Hayes requesting to speak with Radha Alexander ICU attending. Access Center contacted. States \"the hospitalist said there were no beds in the ICU\". Explained that our ED manager spoke with NS at [address] and was told there was one bed available.  States we would like to speak with both NS and ICU MD.

## 2021-03-19 ENCOUNTER — APPOINTMENT (OUTPATIENT)
Dept: ULTRASOUND IMAGING | Age: 71
DRG: 871 | End: 2021-03-19
Attending: INTERNAL MEDICINE
Payer: MEDICARE

## 2021-03-19 PROBLEM — I95.9 HYPOTENSION: Status: ACTIVE | Noted: 2021-03-19

## 2021-03-19 LAB
ANION GAP SERPL CALC-SCNC: 8 MMOL/L (ref 3–18)
APTT PPP: 35.8 SEC (ref 23–36.4)
BUN SERPL-MCNC: 48 MG/DL (ref 7–18)
BUN/CREAT SERPL: 11 (ref 12–20)
CALCIUM SERPL-MCNC: 7.6 MG/DL (ref 8.5–10.1)
CHLORIDE SERPL-SCNC: 111 MMOL/L (ref 100–111)
CO2 SERPL-SCNC: 21 MMOL/L (ref 21–32)
CREAT SERPL-MCNC: 4.32 MG/DL (ref 0.6–1.3)
CRP SERPL-MCNC: 7.3 MG/DL (ref 0–0.3)
FERRITIN SERPL-MCNC: 653 NG/ML (ref 8–388)
FIBRINOGEN PPP-MCNC: 325 MG/DL (ref 210–451)
GLUCOSE BLD STRIP.AUTO-MCNC: 144 MG/DL (ref 70–110)
GLUCOSE BLD STRIP.AUTO-MCNC: 159 MG/DL (ref 70–110)
GLUCOSE BLD STRIP.AUTO-MCNC: 172 MG/DL (ref 70–110)
GLUCOSE SERPL-MCNC: 147 MG/DL (ref 74–99)
INR PPP: 1.3 (ref 0.8–1.2)
IRON SATN MFR SERPL: 85 % (ref 20–50)
IRON SERPL-MCNC: 150 UG/DL (ref 50–175)
LDH SERPL L TO P-CCNC: 671 U/L (ref 81–234)
POTASSIUM SERPL-SCNC: 4.4 MMOL/L (ref 3.5–5.5)
PROTHROMBIN TIME: 16 SEC (ref 11.5–15.2)
SODIUM SERPL-SCNC: 140 MMOL/L (ref 136–145)
TIBC SERPL-MCNC: 177 UG/DL (ref 250–450)
TROPONIN I SERPL-MCNC: <0.02 NG/ML (ref 0–0.04)
TSH SERPL DL<=0.05 MIU/L-ACNC: 0.21 UIU/ML (ref 0.36–3.74)
VANCOMYCIN SERPL-MCNC: 20.2 UG/ML (ref 5–40)

## 2021-03-19 PROCEDURE — 74011636637 HC RX REV CODE- 636/637: Performed by: HOSPITALIST

## 2021-03-19 PROCEDURE — 86140 C-REACTIVE PROTEIN: CPT

## 2021-03-19 PROCEDURE — 85610 PROTHROMBIN TIME: CPT

## 2021-03-19 PROCEDURE — 87880 STREP A ASSAY W/OPTIC: CPT

## 2021-03-19 PROCEDURE — 80076 HEPATIC FUNCTION PANEL: CPT

## 2021-03-19 PROCEDURE — 74011000258 HC RX REV CODE- 258: Performed by: EMERGENCY MEDICINE

## 2021-03-19 PROCEDURE — 84484 ASSAY OF TROPONIN QUANT: CPT

## 2021-03-19 PROCEDURE — 74011250637 HC RX REV CODE- 250/637: Performed by: EMERGENCY MEDICINE

## 2021-03-19 PROCEDURE — 36415 COLL VENOUS BLD VENIPUNCTURE: CPT

## 2021-03-19 PROCEDURE — 83540 ASSAY OF IRON: CPT

## 2021-03-19 PROCEDURE — 74011250636 HC RX REV CODE- 250/636: Performed by: INTERNAL MEDICINE

## 2021-03-19 PROCEDURE — 85384 FIBRINOGEN ACTIVITY: CPT

## 2021-03-19 PROCEDURE — 82962 GLUCOSE BLOOD TEST: CPT

## 2021-03-19 PROCEDURE — 85730 THROMBOPLASTIN TIME PARTIAL: CPT

## 2021-03-19 PROCEDURE — 80048 BASIC METABOLIC PNL TOTAL CA: CPT

## 2021-03-19 PROCEDURE — 99223 1ST HOSP IP/OBS HIGH 75: CPT | Performed by: INTERNAL MEDICINE

## 2021-03-19 PROCEDURE — 74011250637 HC RX REV CODE- 250/637: Performed by: INTERNAL MEDICINE

## 2021-03-19 PROCEDURE — 86038 ANTINUCLEAR ANTIBODIES: CPT

## 2021-03-19 PROCEDURE — 86160 COMPLEMENT ANTIGEN: CPT

## 2021-03-19 PROCEDURE — 74011250636 HC RX REV CODE- 250/636: Performed by: HOSPITALIST

## 2021-03-19 PROCEDURE — 65660000004 HC RM CVT STEPDOWN

## 2021-03-19 PROCEDURE — 74011000250 HC RX REV CODE- 250: Performed by: INTERNAL MEDICINE

## 2021-03-19 PROCEDURE — 83516 IMMUNOASSAY NONANTIBODY: CPT

## 2021-03-19 PROCEDURE — 80202 ASSAY OF VANCOMYCIN: CPT

## 2021-03-19 PROCEDURE — 82784 ASSAY IGA/IGD/IGG/IGM EACH: CPT

## 2021-03-19 PROCEDURE — 76700 US EXAM ABDOM COMPLETE: CPT

## 2021-03-19 PROCEDURE — 84443 ASSAY THYROID STIM HORMONE: CPT

## 2021-03-19 PROCEDURE — 87070 CULTURE OTHR SPECIMN AEROBIC: CPT

## 2021-03-19 PROCEDURE — 83520 IMMUNOASSAY QUANT NOS NONAB: CPT

## 2021-03-19 PROCEDURE — 82728 ASSAY OF FERRITIN: CPT

## 2021-03-19 PROCEDURE — 74011250636 HC RX REV CODE- 250/636: Performed by: EMERGENCY MEDICINE

## 2021-03-19 PROCEDURE — 83615 LACTATE (LD) (LDH) ENZYME: CPT

## 2021-03-19 RX ORDER — IPRATROPIUM BROMIDE AND ALBUTEROL SULFATE 2.5; .5 MG/3ML; MG/3ML
3 SOLUTION RESPIRATORY (INHALATION)
Status: DISCONTINUED | OUTPATIENT
Start: 2021-03-19 | End: 2021-03-23 | Stop reason: HOSPADM

## 2021-03-19 RX ORDER — INSULIN GLARGINE 100 [IU]/ML
10 INJECTION, SOLUTION SUBCUTANEOUS
Status: DISCONTINUED | OUTPATIENT
Start: 2021-03-19 | End: 2021-03-23 | Stop reason: HOSPADM

## 2021-03-19 RX ORDER — HEPARIN SODIUM 5000 [USP'U]/ML
5000 INJECTION, SOLUTION INTRAVENOUS; SUBCUTANEOUS EVERY 8 HOURS
Status: DISCONTINUED | OUTPATIENT
Start: 2021-03-19 | End: 2021-03-23 | Stop reason: HOSPADM

## 2021-03-19 RX ORDER — INSULIN GLARGINE 100 [IU]/ML
15 INJECTION, SOLUTION SUBCUTANEOUS
Status: DISCONTINUED | OUTPATIENT
Start: 2021-03-19 | End: 2021-03-19

## 2021-03-19 RX ORDER — SODIUM CHLORIDE 9 MG/ML
50 INJECTION, SOLUTION INTRAVENOUS CONTINUOUS
Status: DISCONTINUED | OUTPATIENT
Start: 2021-03-19 | End: 2021-03-22

## 2021-03-19 RX ORDER — HYDROXYZINE PAMOATE 25 MG/1
25 CAPSULE ORAL ONCE
Status: COMPLETED | OUTPATIENT
Start: 2021-03-19 | End: 2021-03-19

## 2021-03-19 RX ORDER — CALCIUM CARB/MAGNESIUM CARB 311-232MG
5 TABLET ORAL
Status: DISCONTINUED | OUTPATIENT
Start: 2021-03-19 | End: 2021-03-23 | Stop reason: HOSPADM

## 2021-03-19 RX ADMIN — PIPERACILLIN SODIUM AND TAZOBACTAM SODIUM 4.5 G: 4; .5 INJECTION, POWDER, LYOPHILIZED, FOR SOLUTION INTRAVENOUS at 01:31

## 2021-03-19 RX ADMIN — INSULIN LISPRO 3 UNITS: 100 INJECTION, SOLUTION INTRAVENOUS; SUBCUTANEOUS at 16:30

## 2021-03-19 RX ADMIN — HYDROXYZINE PAMOATE 25 MG: 25 CAPSULE ORAL at 22:16

## 2021-03-19 RX ADMIN — Medication 5 MG: at 22:17

## 2021-03-19 RX ADMIN — NYSTATIN 500000 UNITS: 500000 SUSPENSION ORAL at 17:41

## 2021-03-19 RX ADMIN — PIPERACILLIN SODIUM AND TAZOBACTAM SODIUM 4.5 G: 4; .5 INJECTION, POWDER, LYOPHILIZED, FOR SOLUTION INTRAVENOUS at 10:08

## 2021-03-19 RX ADMIN — INSULIN GLARGINE 10 UNITS: 100 INJECTION, SOLUTION SUBCUTANEOUS at 22:16

## 2021-03-19 RX ADMIN — CEFTRIAXONE SODIUM 2 G: 2 INJECTION, POWDER, FOR SOLUTION INTRAMUSCULAR; INTRAVENOUS at 17:55

## 2021-03-19 RX ADMIN — SODIUM CHLORIDE 50 ML/HR: 900 INJECTION, SOLUTION INTRAVENOUS at 16:00

## 2021-03-19 RX ADMIN — NYSTATIN 500000 UNITS: 500000 SUSPENSION ORAL at 14:12

## 2021-03-19 RX ADMIN — NYSTATIN: 100000 POWDER TOPICAL at 14:12

## 2021-03-19 RX ADMIN — NYSTATIN 500000 UNITS: 500000 SUSPENSION ORAL at 22:17

## 2021-03-19 RX ADMIN — INSULIN LISPRO 3 UNITS: 100 INJECTION, SOLUTION INTRAVENOUS; SUBCUTANEOUS at 22:00

## 2021-03-19 RX ADMIN — NYSTATIN 500000 UNITS: 500000 SUSPENSION ORAL at 09:00

## 2021-03-19 RX ADMIN — Medication 5 MG: at 02:51

## 2021-03-19 RX ADMIN — DIPHENHYDRAMINE HYDROCHLORIDE 25 MG: 25 CAPSULE ORAL at 14:12

## 2021-03-19 RX ADMIN — NYSTATIN: 100000 POWDER TOPICAL at 18:00

## 2021-03-19 NOTE — CONSULTS
Infectious Disease Consultation Note        Reason: Septic shock    Current abx Prior abx   Pip/tazo, vancomycin since 3/18      Lines:       Assessment :  78 y.o. female with past medical history of CAD s/p 2 MI in the past s/p stent in 2020/diabetes mellitus type 2 uncontrolled high blood glucose/morbid obesity/hypertension/COPD/CKD 3  Admitted to [facility] on 3/17/21  with history of increasing pruritus generalized along with likely developing high fever, severe oropharyngeal pain burning pain. Now with gram-positive bacteremia, hypotension requiring pressors, worsening renal function    Patient presents with a highly complex clinical picture. Difficulty determine exact etiology of patient's presentation at this time. differential diagnosis includes septic shock secondary to gram-positive bloodstream infection. ? Staphylococcus toxic shock syndrome versus pharyngitis due to pathogens such as arcanobacterium hemolyticum versus allergic reaction to gabapentin & hypotension/CARA due to hypovolemia. Will need to follow-up identification of gram-positive cocci in blood culture 3/17 and repeat blood cultures to determine the significance of current gram-positive bacteremia    Acute on chronic kidney disease-nephrology follow-up appreciated    Rash-could be due to sepsis Staphylococcus toxic shock syndrome versus bacterial pharyngitis related pathogens/drug rash    Off pressors. Recommendations:    1. D/c pip/tazo. Start ceftriaxone. Continue vancomycin  2. Follow-up identification and susceptibility of gram-positive cocci in blood culture 3/17  3. Obtain 1 set of blood culture from left IJ CVC, 1 set from periphery  4. Remove CVC if no further need for pressors and peripheral IV access established  5. Continue Benadryl as needed  6. Follow-up nephrology recommendations regarding acute kidney injury  7. Obtain throat culture, group A strep screen    I will see patient on Monday.   Please call me @ [phone] if any new questions or concerns in the interim. thanks    Thank you for consultation request. Above plan was discussed in details with patient, RN. Please call me if any further questions or concerns. Will continue to participate in the care of this patient. HPI:    78 y.o. female with past medical history of CAD s/p 2 MI in the past s/p stent in 2020/diabetes mellitus type 2 uncontrolled high blood glucose/morbid obesity/hypertension/COPD/CKD 3  Admitted to [facility] on 3/17/21  with history of increasing pruritus generalized along with likely developing high fever, severe oropharyngeal pain burning pain.     I obtained history by talking to patient, review of clinical records, talking to ED physician. According to patient she was fine about a week ago. She stopped taking gabapentin last Saturday. Her physician increased her dose from 300 mg to 400 mg. She did not take gabapentin last weekend. She took the first pill of 400 mg dose on Monday. As per her, she was doing good the rest of the day. On Tuesday she started noticing diffuse rash with itching. She stopped taking gabapentin. She started having subjective malaise,  she developed very high fevers, along with oropharyngeal pain burning pain unable to eat drink due to pain since 3/17. patient went to the emergency room at [facility] on 3/17/2021. Where she was found to have rash with pruritus generalized, high fevers, hypotension requiring pressors, CARA on CKD 3 4 Covid bio fire was negative-patient admitted for sepsis of unknown etiology. Nephrology was consulted. I was consulted for further recommendations. Patient continues to have itchiness of the rash. She states that her throat burns when she drinks soda or eats citrus fruits. Able to swallow water. Denies any recent skin infection or skin laceration. Denies use of any intravaginal products. No vaginal discharge. Denies prior history of MRSA colonization/infection.   Denies any increasing shortness of breath, cough, abdominal pain. No recent history of travel. No recent tick bites. Has not consumed sea food atleast since the past month. Past Medical History:   Diagnosis Date    Abnormal WBC count 5/17/2016    Anemia     Bilateral shoulder pain 9/27/2016    Chondromalacia of both patellae     COPD (chronic obstructive pulmonary disease) [facility] 9/2015    mild-mod dz; Dr Bryson Truong    Diabetes [facility]) 2013    [facility] 2016    [facility] cardiomyopathy [facility]     Dyslipidemia     Gout     Heart attack ([facility]     Heart attack ([facility]     [facility] 10/18/2019    History of echocardiogram 11/14/2014    Mild LVE. EF 40%. Mild, diffuse hypk. Mild LAE. Mild MR.      Hypercholesteremia 1/08/14    Hypertension 2000    Noncompliance with medications 2/16/2016    Obesity     Orthostatic hypotension 5/17/2016    Osteoarthritis of both knees     Pain management 04/01/2016    Dr. Terrie Bernardo Popliteal cyst, bilateral      Vitamin D deficiency 10/16/14       Past Surgical History:   Procedure Laterality Date    HX HEART CATHETERIZATION      HX TUBAL LIGATION         home Medication List    Details   atorvastatin (LIPITOR) 80 mg tablet Take 1 Tab by mouth daily. Qty: 90 Tab, Refills: 3    Associated Diagnoses: Dyslipidemia, goal LDL below 70      carvediloL (COREG) 25 mg tablet Take 1 Tab by mouth two (2) times daily (with meals). Qty: 180 Tab, Refills: 3    Associated Diagnoses: Essential hypertension; Chronic systolic congestive heart failure (HCC)      sacubitriL-valsartan (Entresto)  mg tablet Take 1 Tab by mouth two (2) times a day. Qty: 180 Tab, Refills: 2    Associated Diagnoses: Chronic systolic congestive heart failure (HCC)      furosemide (LASIX) 40 mg tablet Take 1 Tab by mouth two (2) times daily as needed (edema).   Qty: 180 Tab, Refills: 1    Associated Diagnoses: Chronic systolic congestive heart failure (HCC)      tiZANidine (ZANAFLEX) 4 mg tablet Take 4 mg by mouth two (2) times a day. lidocaine (Lidoderm) 5 % Apply patch to the affected area for 12 hours a day and remove for 12 hours a day. Qty: 1 Package, Refills: 0      pantoprazole (PROTONIX) 40 mg tablet Take 1 Tab by mouth daily. Qty: 30 Tab, Refills: 0      albuterol (PROVENTIL HFA, VENTOLIN HFA, PROAIR HFA) 90 mcg/actuation inhaler Take 2 Puffs by inhalation every four (4) hours as needed for Wheezing. Qty: 1 Inhaler, Refills: 0      aspirin 81 mg chewable tablet Take 2 Tabs by mouth daily. Qty: 60 Tab, Refills: 0    Associated Diagnoses: Coronary artery disease involving native coronary artery of native heart without angina pectoris      insulin detemir (LEVEMIR) 100 unit/mL injection 15 units daily for diabetes  Qty: 2 Vial, Refills: 0    Associated Diagnoses: Diabetes mellitus type 2, insulin dependent (HCC)      desloratadine (CLARINEX) 5 mg tablet Take 1 Tab by mouth daily as needed for Allergies. Qty: 30 Tab, Refills: 0    Associated Diagnoses: Environmental allergies      mometasone (NASONEX) 50 mcg/actuation nasal spray 2 Sprays by Both Nostrils route daily as needed. For allergies  Qty: 3 Container, Refills: 3    Associated Diagnoses: Environmental allergies      umeclidinium-vilanterol (ANORO ELLIPTA) 62.5-25 mcg/actuation inhaler Take 1 Puff by inhalation daily. For COPD  Qty: 3 Inhaler, Refills: 3    Comments: 9/22/15 Dr Stephany Bence prescribed.  Will order via [facility] if available  Associated Diagnoses: Chronic obstructive pulmonary disease, unspecified [facility]             Current Facility-Administered Medications   Medication Dose Route Frequency    melatonin (rapid dissolve) tablet 5 mg  5 mg Oral QHS    sodium chloride (NS) flush 5-10 mL  5-10 mL IntraVENous PRN    piperacillin-tazobactam (ZOSYN) 4.5 g in 0.9% sodium chloride (MBP/ADV) 100 mL MBP  4.5 g IntraVENous Q6H    acetaminophen (TYLENOL) tablet 650 mg  650 mg Oral Q6H PRN    Or    acetaminophen (TYLENOL) suppository 650 mg  650 mg Rectal Q6H PRN    NOREPINephrine (LEVOPHED) 8 mg in 5% dextrose 250mL (32 mcg/mL) infusion  2-16 mcg/min IntraVENous TITRATE    VANCOMYCIN INFORMATION NOTE   Other Rx Dosing/Monitoring    insulin lispro (HUMALOG) injection   SubCUTAneous AC&HS    glucose chewable tablet 16 g  4 Tab Oral PRN    glucagon (GLUCAGEN) injection 1 mg  1 mg IntraMUSCular PRN    dextrose (D50W) injection syrg 12.5-25 g  25-50 mL IntraVENous PRN    lidocaine (XYLOCAINE) 2 % viscous solution 15 mL  15 mL Mouth/Throat PRN    diphenhydrAMINE (BENADRYL) capsule 25 mg  25 mg Oral Q6H PRN    nystatin (MYCOSTATIN) 100,000 unit/mL oral suspension 500,000 Units  500,000 Units Oral QID    nystatin (MYCOSTATIN) 100,000 unit/gram powder   Topical BID       Allergies: Patient has no known allergies.     Family History   Problem Relation Age of Onset    Diabetes Mother     Hypertension Mother     Hypertension Father     Kidney Disease Maternal Aunt 48        Dialysis     Social History     Socioeconomic History    Marital status: LEGALLY      Spouse name: Not on file    Number of children: 3    Years of education: Not on file    Highest education level: Not on file   Occupational History    Occupation: retired   Social Needs    Financial resource strain: Not on file    Food insecurity     Worry: Not on file     Inability: Not on file   [employer] needs     Medical: Not on file     Non-medical: Not on file   Tobacco Use    Smoking status: Former Smoker     Packs/day: 0.25     Years: 48.00     Pack years: 12.00     Types: Cigarettes     Quit date: 2018     Years since quittin.8    Smokeless tobacco: Never Used   Substance and Sexual Activity    Alcohol use: No     Alcohol/week: 0.0 standard drinks    Drug use: No    Sexual activity: Yes     Partners: Male   Lifestyle    Physical activity     Days per week: Not on file     Minutes per session: Not on file    Stress: Not on file Relationships    Social connections     Talks on phone: Not on file     Gets together: Not on file     Attends Voodoo service: Not on file     Active member of club or organization: Not on file     Attends meetings of clubs or organizations: Not on file     Relationship status: Not on file    Intimate partner violence     Fear of current or ex partner: Not on file     Emotionally abused: Not on file     Physically abused: Not on file     Forced sexual activity: Not on file   Other Topics Concern     Service No    Blood Transfusions No    Caffeine Concern No    Occupational Exposure No    Hobby Hazards No    Sleep Concern No    Stress Concern No    Weight Concern No    Special Diet No    Back Care No    Exercise No    Bike Helmet No    Seat Belt Yes    Self-Exams Yes   Social History Narrative    Not on file     Social History     Tobacco Use   Smoking Status Former Smoker    Packs/day: 0.25    Years: 48.00    Pack years: 12.00    Types: Cigarettes    Quit date: 2018    Years since quittin.8   Smokeless Tobacco Never Used        Temp (24hrs), Av.7 °F (36.5 °C), Min:97.3 °F (36.3 °C), Max:97.9 °F (36.6 °C)    Visit Vitals  BP (!) 107/56 (BP 1 Location: Right lower arm, BP Patient Position: At rest)   Pulse 77   Temp 97.7 °F (36.5 °C)   Resp 16   Ht 5' 4.02\" (1.626 m) Comment: Nephrology office visit   Wt 106.9 kg (235 lb 9.6 oz)   SpO2 99%   BMI 40.42 kg/m²       ROS: 12 point ROS obtained in details.  Pertinent positives as mentioned in HPI,   otherwise negative    Physical Exam:    General appearance - alert, well appearing, and in no distress, oriented to person, place, and time and overweight  Mental status - alert, oriented to person, place, and time  Eyes - pupils equal and reactive, extraocular eye movements intact  Ears - bilateral TM's and external ear canals normal  Nose - normal and patent, no erythema, discharge or polyps  Mouth - mucous membranes moist, pharynx normal without lesions  Neck - supple, no significant adenopathy  Chest - clear to auscultation, no wheezes, rales or rhonchi, symmetric air entry  Heart - normal rate, regular rhythm, normal S1, S2, no rubs, clicks or gallops  Abdomen - soft, nontender, nondistended, no masses or organomegaly  Breasts -rash underneath both breast  Neurological - alert, oriented, normal speech, no focal findings or movement disorder noted  Musculoskeletal - no joint tenderness, deformity or swelling  Extremities - peripheral pulses normal, no pedal edema, no clubbing or cyanosis  Skin - darkish erythematous macular rash on trunk, abdomen, bilateral upper extremity, lower extremity, entire back    Labs: Results:   Chemistry Recent Labs     03/19/21  0400 03/18/21  1808 03/18/21  0129   * 280* 171*    138 136   K 4.4 3.9 4.2    106 104   CO2 21 20* 21   BUN 48* 46* 34*   CREA 4.32* 5.23* 5.53*   CA 7.6* 7.9* 8.0*   AGAP 8 12 11   BUCR 11* 9* 6*   AP  --   --  147*   TP  --   --  6.0*   ALB  --   --  2.7*   GLOB  --   --  3.3   AGRAT  --   --  0.8      CBC w/Diff Recent Labs     03/18/21  1808 03/18/21  0129   WBC 7.2 7.1   RBC 3.63* 4.43   HGB 10.0* 12.3   HCT 32.3* 39.2    237   GRANS 81* 66   LYMPH 11* 26   EOS 2 0      Microbiology Recent Labs     03/18/21  0630 03/18/21  0130 03/18/21  0129   CULT PENDING CULTURE IN PROGRESS,FURTHER UPDATES TO FOLLOW Sent to [facility] for ID/Susceptibility if indicated. NO GROWTH 1 DAY          RADIOLOGY:    All available imaging studies/reports in [facility] for this admission were reviewed      Disclaimer: Sections of this note are dictated utilizing voice recognition software, which may have resulted in some phonetic based errors in grammar and contents. Even though attempts were made to correct all the mistakes, some may have been missed, and remained in the body of the document. If questions arise, please contact our department.     Dr. Kim Pettit, Infectious Disease Specialist  [phone]  March 19, 2021  9:47 AM

## 2021-03-19 NOTE — PROGRESS NOTES
Bedside report from [facility] and Verbal shift change report given to Mary Rhoades  (oncoming nurse) by Jayde Jacob ** (offgoing nurse). Report included the following information SBAR, Kardex, Intake/Output, MAR, Recent Results, Cardiac Rhythm SR and Quality Measures. Patient awake and alert dyspnea with exertion   Critical Labs  BC + aerobic gm + cocci grps. Dr Sade Teresa made aware Sridevi Jorge. Patient offers no complaints of pain. off unit for US returned without incident . Seen by JORDYN Hoffman may repeat BC new orders. Rec'd Benadryl po for rash and itching . Minimal efficacy  Ate 100% meals . No s/s of hypo/hyperglycemia . No adverse reaction. Refused heparin sq this shift. Educated on the importance of coagulation therapy   Bedside and Verbal shift change report given to Tonia Mensah  (oncoming nurse) by Nicol Ochoa RN* (offgoing nurse). Report included the following information SBAR, Kardex, ED Summary, Intake/Output, MAR, Recent Results, Cardiac Rhythm SR and Quality Measures.

## 2021-03-19 NOTE — CONSULTS
Consult Note  Consult requested by: dr Tianna Chase is a 78 y.o. female BLACK/ who is being seen on consult for worsening renal failure  Chief Complaint   Patient presents with    Fever    Fatigue    Cough     Admission diagnosis: Sepsis ([facility] [address]     HPI: 78 y o  [place] female with hx of crf stage 3,presented to [facility] er yesterday with fever,hypotension,rash,confusion. was given ivf and antibiotics with improvement. pt had noted rash and itching  after her neurontin dose was increased. she feels much better today. hx of crf,dm,htn,copd,cardiomyopathy on entresto  Past Medical History:   Diagnosis Date    Abnormal WBC count 5/17/2016    Anemia     Bilateral shoulder pain 9/27/2016    Chondromalacia of both patellae     COPD (chronic obstructive pulmonary disease) ([facility] [address] 9/2015    mild-mod dz; Dr Bi Zavaleta    Diabetes [facility]) 2013    Diabetic eye exam ([facility] [address] 2016    Dilated cardiomyopathy ([facility] [address]     Dyslipidemia     Gout     Heart attack ([facility] [address]     Heart attack ([address]     Heart attack ([address] 10/18/2019    History of echocardiogram 11/14/2014    Mild LVE. EF 40%. Mild, diffuse hypk. Mild LAE.   Mild MR.      Hypercholesteremia 1/08/14    Hypertension 2000    Noncompliance with medications 2/16/2016    Obesity     Orthostatic hypotension 5/17/2016    Osteoarthritis of both knees     Pain management 04/01/2016    Dr. Dominique Krishnamurthy Popliteal cyst, bilateral      Vitamin D deficiency 10/16/14      Past Surgical History:   Procedure Laterality Date    HX HEART CATHETERIZATION      HX TUBAL LIGATION         Social History     Socioeconomic History    Marital status:      Spouse name: Not on file    Number of children: 3    Years of education: Not on file    Highest education level: Not on file   Occupational History    Occupation: retired   Social Needs    Financial resource strain: Not on file    Food insecurity     Worry: Not on file     Inability: Not on file   [employer] needs     Medical: Not on file     Non-medical: Not on file   Tobacco Use    Smoking status: Former Smoker     Packs/day: 0.25     Years: 48.00     Pack years: 12.00     Types: Cigarettes     Quit date: 2018     Years since quittin.8    Smokeless tobacco: Never Used   Substance and Sexual Activity    Alcohol use: No     Alcohol/week: 0.0 standard drinks    Drug use: No    Sexual activity: Yes     Partners: Male   Lifestyle    Physical activity     Days per week: Not on file     Minutes per session: Not on file    Stress: Not on file   Relationships    Social connections     Talks on phone: Not on file     Gets together: Not on file     Attends Faith service: Not on file     Active member of club or organization: Not on file     Attends meetings of clubs or organizations: Not on file     Relationship status: Not on file    Intimate partner violence     Fear of current or ex partner: Not on file     Emotionally abused: Not on file     Physically abused: Not on file     Forced sexual activity: Not on file   Other Topics Concern     Service No    Blood Transfusions No    Caffeine Concern No    Occupational Exposure No    Hobby Hazards No    Sleep Concern No    Stress Concern No    Weight Concern No    Special Diet No    Back Care No    Exercise No    Bike Helmet No    Seat Belt Yes    Self-Exams Yes   Social History Narrative    Not on file       Family History   Problem Relation Age of Onset    Diabetes Mother     Hypertension Mother     Hypertension Father     Kidney Disease Maternal Aunt 50        Dialysis     No Known Allergies     Home Medications:     Prior to Admission Medications   Prescriptions Last Dose Informant Patient Reported? Taking? albuterol (PROVENTIL HFA, VENTOLIN HFA, PROAIR HFA) 90 mcg/actuation inhaler   No No   Sig: Take 2 Puffs by inhalation every four (4) hours as needed for Wheezing.    aspirin 81 mg chewable tablet No No   Sig: Take 2 Tabs by mouth daily. atorvastatin (LIPITOR) 80 mg tablet   No No   Sig: Take 1 Tab by mouth daily. carvediloL (COREG) 25 mg tablet   No No   Sig: Take 1 Tab by mouth two (2) times daily (with meals). desloratadine (CLARINEX) 5 mg tablet   No No   Sig: Take 1 Tab by mouth daily as needed for Allergies. furosemide (LASIX) 40 mg tablet   No No   Sig: Take 1 Tab by mouth two (2) times daily as needed (edema). insulin detemir (LEVEMIR) 100 unit/mL injection   No No   Sig: 15 units daily for diabetes   lidocaine (Lidoderm) 5 %   No No   Sig: Apply patch to the affected area for 12 hours a day and remove for 12 hours a day. mometasone (NASONEX) 50 mcg/actuation nasal spray   No No   Si Sprays by Both Nostrils route daily as needed. For allergies   pantoprazole (PROTONIX) 40 mg tablet   No No   Sig: Take 1 Tab by mouth daily. sacubitriL-valsartan (Entresto)  mg tablet   No No   Sig: Take 1 Tab by mouth two (2) times a day. tiZANidine (ZANAFLEX) 4 mg tablet   Yes No   Sig: Take 4 mg by mouth two (2) times a day. umeclidinium-vilanterol (ANORO ELLIPTA) 62.5-25 mcg/actuation inhaler   No No   Sig: Take 1 Puff by inhalation daily.  For COPD      Facility-Administered Medications: None       Current Facility-Administered Medications   Medication Dose Route Frequency    melatonin (rapid dissolve) tablet 5 mg  5 mg Oral QHS    insulin glargine (LANTUS) injection 15 Units  15 Units SubCUTAneous QHS    piperacillin-tazobactam (ZOSYN) 2.25 g in 0.9% sodium chloride (MBP/ADV) 50 mL MBP  2.25 g IntraVENous Q8H    heparin (porcine) injection 5,000 Units  5,000 Units SubCUTAneous Q8H    albuterol-ipratropium (DUO-NEB) 2.5 MG-0.5 MG/3 ML  3 mL Nebulization Q6H PRN    sodium chloride (NS) flush 5-10 mL  5-10 mL IntraVENous PRN    acetaminophen (TYLENOL) tablet 650 mg  650 mg Oral Q6H PRN    Or    acetaminophen (TYLENOL) suppository 650 mg  650 mg Rectal Q6H PRN    NOREPINephrine (LEVOPHED) 8 mg in 5% dextrose 250mL (32 mcg/mL) infusion  2-16 mcg/min IntraVENous TITRATE    VANCOMYCIN INFORMATION NOTE   Other Rx Dosing/Monitoring    insulin lispro (HUMALOG) injection   SubCUTAneous AC&HS    glucose chewable tablet 16 g  4 Tab Oral PRN    glucagon (GLUCAGEN) injection 1 mg  1 mg IntraMUSCular PRN    dextrose (D50W) injection syrg 12.5-25 g  25-50 mL IntraVENous PRN    lidocaine (XYLOCAINE) 2 % viscous solution 15 mL  15 mL Mouth/Throat PRN    diphenhydrAMINE (BENADRYL) capsule 25 mg  25 mg Oral Q6H PRN    nystatin (MYCOSTATIN) 100,000 unit/mL oral suspension 500,000 Units  500,000 Units Oral QID    nystatin (MYCOSTATIN) 100,000 unit/gram powder   Topical BID       Review of Systems:   A comprehensive review of systems was negative except for that written in the HPI. Data Review:    Labs: Results:       Chemistry Recent Labs     03/19/21  0400 03/18/21 1808 03/18/21  0129   * 280* 171*    138 136   K 4.4 3.9 4.2    106 104   CO2 21 20* 21   BUN 48* 46* 34*   CREA 4.32* 5.23* 5.53*   CA 7.6* 7.9* 8.0*   AGAP 8 12 11   BUCR 11* 9* 6*   AP  --   --  147*   TP  --   --  6.0*   ALB  --   --  2.7*   GLOB  --   --  3.3   AGRAT  --   --  0.8      PTH  Lab Results   Component Value Date/Time    Calcium 7.6 (L) 03/19/2021 04:00 AM    Phosphorus 4.3 10/22/2019 03:30 AM    PTH, Intact 386.0 (H) 10/19/2019 08:40 PM      CBC w/Diff Recent Labs     03/18/21  1808 03/18/21  0129   WBC 7.2 7.1   RBC 3.63* 4.43   HGB 10.0* 12.3   HCT 32.3* 39.2    237   GRANS 81* 66   LYMPH 11* 26   EOS 2 0      Coagulation Recent Labs     03/19/21  0400   PTP 16.0*   INR 1.3*   APTT 35.8       Iron/Ferritin Recent Labs     03/19/21  0400   IRON 150      BNP No results for input(s): BNPP in the last 72 hours. Cardiac Enzymes No results for input(s): CPK, CKND1, GODFREY in the last 72 hours.     No lab exists for component: CKRMB, TROIP   Liver Enzymes Recent Labs     03/18/21  0129   TP 6.0* ALB 2.7*   *      Thyroid Studies Lab Results   Component Value Date/Time    TSH 0.21 (L) 03/19/2021 04:00 AM        Urinalysis Lab Results   Component Value Date/Time    Color DARK YELLOW 03/18/2021 02:43 AM    Appearance CLOUDY 03/18/2021 02:43 AM    Specific gravity 1.024 03/18/2021 02:43 AM    pH (UA) 5.0 03/18/2021 02:43 AM    Protein >1,000 (A) 03/18/2021 02:43 AM    Glucose Negative 03/18/2021 02:43 AM    Ketone TRACE (A) 03/18/2021 02:43 AM    Bilirubin SMALL (A) 03/18/2021 02:43 AM    Urobilinogen 1.0 03/18/2021 02:43 AM    Nitrites Negative 03/18/2021 02:43 AM    Leukocyte Esterase TRACE (A) 03/18/2021 02:43 AM    Epithelial cells 3+ 10/18/2019 07:34 PM    Bacteria 3+ (A) 03/18/2021 02:43 AM    WBC 0 to 3 03/18/2021 02:43 AM    RBC 0 to 3 03/18/2021 02:43 AM         IMAGES:   XR Results (maximum last 3): Results from [place] encounter on 03/17/21   XR CHEST PORT    Narrative Portable Chest    CPT CODE: 79872    HISTORY: Line placement. FINDINGS:     Comparison 10/22/2020    Single frontal view of the chest. Lordotic positioning. Left internal jugular  line tip at the proximal to mid SVC. Multiple wires overlie the patient. No  pneumothorax. Heart size and mediastinal contours within normal limits. No  effusion or focal lung consolidation. Impression No pneumothorax after left CVL placement. Tip in the region of the proximal to  mid SVC. Results from [place] encounter on 11/08/20   XR KNEE LT 3 V    Narrative LEFT KNEE, THREE VIEWS    CPT CODE: 79632    INDICATION: Above. Left knee pain/injury status post fall    COMPARISON: 10/12/2015. TECHNIQUE: AP, lateral and internally rotated oblique views of the left knee are  reviewed. FINDINGS:     There is no evidence of acute fracture or dislocation. There is mild fullness  of the suprapatellar soft tissues suspicious for a small joint effusion.  Mild to  moderate degenerative changes with periarticular osteophytosis best seen at the  medial and patellofemoral compartments, similar compared to the prior  radiographs. Anterior patellar enthesophytes also again incidentally noted. Vascular calcifications. Impression Impression:     No evidence of acute fracture or dislocation. Evidence for small joint effusion. Chronic degenerative changes. Results from [place] encounter on 10/22/20   XR CHEST PORT    Narrative EXAM: CHEST ONE VIEW  portable 1733 hours    CLINICAL HISTORY/INDICATION: Left-sided Chest pain associated with left scapular  pain x3 days, pain is described as sharp, constant, and worse with movement, the  scapular pain radiates into the left lower back, associated muscle spasms and  tightness    COMPARISON: Chest x-ray October 27, 2019. TECHNIQUE: One view obtained. FINDINGS:     The cardiac and mediastinal silhouette is normal. The lungs are clear. Pulmonary  vascularity is normal. The costophrenic angles are sharply defined. Mild disc  space narrowing with marginal spurring involves the mid and lower thoracic  spine. .      Impression IMPRESSION:    No acute finding. CT Results (maximum last 3): Results from [place] encounter on 03/17/21   CT CHEST ABD PELV WO CONT    Narrative CT CHEST ABDOMEN AND PELVIS WITHOUT CONTRAST    COMPARISON: Plain films, prior CTA chest.    INDICATIONS: Sepsis, abnormal liver function tests, elevated BNP. TECHNIQUE: Noncontrast total CT images were obtained from the neck to the  perineum using volumetric data acquisition on a multislice scanner. Reconstructions were created in axial, coronal, and sagittal planes. Soft  tissue, lung, and bone algorithms were utilized. Heddy  FINDINGS:     CT CHEST FINDINGS:    Thyroid/Base Of Neck: Negative. Lungs:   A strand of subsegmental atelectasis or fibrosis is seen in the left base. Otherwise clear    Pleural Spaces:  No pneumothorax or pleural fluid.     Chest Wall:   No breast mass or axillary adenopathy. Mediastinum, Maria, Great Vessels, Heart:  No adenopathy or mass evident. Cardiac chamber enlargement is present. CT ABDOMEN FINDINGS:     Liver/Biliary: Negative. Spleen: Negative. Adrenal Glands: Normal.    Kidneys: Small right-sided renal cysts. Tiny nonobstructive intrarenal calculus  in the left lower pole. Otherwise negative. Pancreas: Unremarkable. Stomach, Small Bowel And Colon: Normal stomach and small bowel. Normal appendix. Diverticulosis without diverticulitis. Peritoneal lymphadenopathy is evident. The abdominal aorta and IVC are unremarkable. Peritoneal Spaces: No free fluid or free air. Abdominal Wall: Unremarkable. Bladder: Collapsed about an indwelling Frye catheter. Myomatous uterus    Osseous Structures Of The Chest, Abdomen And Pelvis: No acute or aggressive  abnormalities evident. .      Impression No occult septic focus is identified. .  Abnormalities are identified in the chest abdomen or pelvis          All CT scans at this facility are performed using dose optimization technique as  appropriate to the performed exam, to include automated exposure control,  adjustment of the mA and/or kV according to patient's size (Including  appropriate matching for site-specific examinations), or use of iterative  reconstruction technique. CT HEAD WO CONT    Narrative CT brain without enhancement     CPT code: 18680    Indication: Mechanical ground-level fall. Altered mental status. Worsening  lethargy. Metabolic encephalopathy. .    Technique: Unenhanced 5 mm collimation axial images obtained from the skull base  through the vertex. Dose reduction techniques used: Automated exposure control, adjustment of the  mAs and/or kVp according to patient size, standardized low-dose protocol, and/or  iterative reconstruction technique. Comparison: October 18, 2019. Findings: There is no intracranial hemorrhage.   There is no evidence for mass.     Moderate cortical atrophy is present with compensatory ventricular dilatation. The gray-white matter differentiation is acutely preserved. There are moderate  white matter hypodensities. No extra-axial fluid collections. The ventricles  are symmetric and midline in position. Vascular structures are symmetric in  attenuation. Basilar cisterns are patent. Sinuses: Clear. Orbits: Normal.  Calvarium:Normal.      Impression 1. No acute intracranial pathology. 2. Moderate atrophy and sequela of chronic small vessel ischemic disease. Results from [place] encounter on 10/22/20   CTA CHEST W OR W WO CONT    Narrative EXAM:  CTA Chest with Contrast (including 3D Post-Processing). CLINICAL INDICATION:  Marfan's sharp left-sided scapular pain with radiation  into chest.  Evaluate for PE or aortic pathology. COMPARISON:  CTA chest 10/18/19, 05/04/19. TECHNIQUE:      - Helically scanned volumetric axial sections of the chest are obtained  following 80 mL Isovue-370 intravenous injection, helical volumetric image  acquisition is performed again during arterial phase. Coronal and sagittal  multiplanar reconstruction images are generated subsequently at the same  workstation.    - Using the volumetric data set, 3D postprocessing is performed at a separate  workstation. The 3-D postprocessing includes volume rendered surface shaded  images of the aorta and branches of the aorta with both superior to inferior  rotating frame and left the right rotating frame, MIPS (maximum intensity  projection) images also in 2 different rotating frames. Additional snapshot  images are also generated along with curved planar reconstruction images. - Current scan is performed using dose optimization techniques as appropriate to  the performed exam including the automated exposure control, adjustment of mA  and/or kV according to patient size, and use of iterative reconstructive  technique. FINDINGS:    CTA Vascular Evaluation    Aorta:       - The aorta demonstrates no evidence of dissection or aneurysm. - Intact aortic arch. - Aortic segmental diameter data are compiled as follows. Sinus of Valsalva 3.0  cm, sinotubular junction 2.8 cm, maximum ascending aorta 3.3 cm, aortic arch 2.9  cm, proximal descending aorta 2.3 cm, mid descending thoracic aorta 0.3 cm,  distal descending aorta 2.0 cm. Pulmonary Artery:  Intact. No PE. Abdominal Aorta: The visualized portions appear unremarkable terms of caliber. Moderate atheromatous plaque calcifications near the origins of the celiac,  superior mesenteric and renal arteries. Visceral Arteries:  Minimal narrowing at the celiac artery origin. No  significant tube is in turn artery narrowing. Moderate narrowing at right renal  artery origin and proximal segment. No significant narrowing at the left renal  artery origin despite atherosclerotic calcifications. CT Chest    Lungs:  No dominant lung mass or suspicious lung nodule is detected. Subsegmental atelectatic changes or scarring changes are noted at the lower  lobes. Pleura:  No significant pleural effusion. Mediastinum, Maria:  Shotty mediastinal nodes. The largest node measures about  1.5 x 0.9 cm. Base of Neck, Axillae:  No adenopathy. Esophagus:  Unremarkable. Upper Abdomen:  Right kidney upper pole region small renal hypodensity measuring  about 1.2 cm. Bones:  No osteoblastic or osteolytic lesions. Impression IMPRESSION:    1. No acute aortic abnormalities. No PE.    2.  Slight subsegmental atelectatic changes vs. scarring in the posterior  aspects of both lower lobes. 3.  Small right renal cyst.                                     MRI Results (maximum last 3): No results found for this or any previous visit. Nuclear Medicine Results (maximum last 3): No results found for this or any previous visit.     US Results (maximum last 3):  Results from Hospital Encounter encounter on 10/18/19   US ABD COMP    Narrative EXAMINATION: Ultrasound abdomen complete    INDICATION: Cardiac preprocedure exam    COMPARISON: None    TECHNIQUE: Grayscale, color, spectral sonographic images of the abdomen  obtained. FINDINGS:    Liver: 15.7 cm length. Slightly coarsened increased echotexture. No focal  abnormality. Portal vein normal caliber with antegrade flow. Pancreas: Unremarkable. Tail not well visualized. IVC: Not well visualized. Right kidney: 9.4 cm length. Normal echotexture. No hydronephrosis. 1.2 x 1.1 cm  cyst in the mid kidney. Gallbladder: A few layering calculi. No wall thickening. Negative sonographic  Grey's sign. Biliary tree: Common bile duct 0.4 cm caliber. No intrahepatic duct dilatation. Abdominal aorta: No aneurysm. Spleen: 8.8 cm length. No focal abnormality. Left kidney: 8.8 cm length. Normal echotexture. No hydronephrosis. No focal  abnormality. Impression IMPRESSION:    Cholelithiasis without evidence of cholecystitis. Slight coarsened hepatic echotexture suggests nonspecific hepatocellular  disease. Small right renal cyst.    No other significant findings. Exam limitations described above. Results from [place] encounter on 09/23/18   US TRANSVAGINAL    Narrative ULTRASOUND OF THE PELVIS by Transvaginal Technique    INDICATION: Vaginal bleeding. TECHNIQUE: Multiple real time sonographic images were obtained through the  pelvis by transvaginal technique. Color and spectral Doppler technique were  also utilized. COMPARISON: Pelvic ultrasound 7/25/2018. FINDINGS:  The uterus measures 9.2 x 7.6 x 4.3 cm. Cervix measures 3.7 cm in length. Nabothian cysts were noted. The endometrial thickening and heterogeneous, measures 1.7 cm in thickness  (previously 1.1 cm).     -Lower uterine fibroid measures 3.0 x 2.6 x 2.6 cm.  -Right uterine fibroid measures 3.8 x 2.8 x 2.4 cm, extends into the right  adnexa.  -Left calcified fibroid measures 1.3 x 1.0 x 0.7 cm. Cyst    The ovaries are not visualized. -The right ovary is reportedly surgically absent.  -Left ovary may have been obscured by overlying bowel gas. No free fluid was seen. Impression IMPRESSION:    1. Persistent thickened endometrium in the setting of postmenopausal bleeding.  -Recommend GYN consultation for possible endometrial biopsy if not already  performed. 2. Multiple uterine fibroids. 3. Ovaries were not visualized. Results from Hospital Encounter encounter on 07/25/18   US PELV NON OB W TV    Narrative Pelvic and transvaginal ultrasound    HISTORY: Postmenopausal bleeding    COMPARISON: None. FINDINGS: Transabdominal pelvic ultrasound was initially performed followed with  transvaginal approach. The uterus appears normal in contour and size, measuring  10.3 x 3.8 x 4.8 cm. Heterogeneous myometrium with multiple small fibroids  identified. The largest measures 3.1 x 2.1 x 2.4 cm in anterior fundus. The  second fibroid seen at left fundus measuring 2.6 x 1.9 x 1.9 cm. The endometrial  stripe appears echogenic and slight heterogeneous, measuring 11.1 mm. No  discrete mass seen. The right ovary is surgically removed. The left ovary measures 2.4 x 1.5 x 2.5  cm. Color flow and spectral doppler analysis with normal flow identified in the  left ovary. No adnexal masses. Trace amount free fluid identified in the  cul-de-sac. Impression IMPRESSION:    1. Heterogeneous myometrium with multiple small fibroids, the largest 2 are in  the fundus. 2. Echogenic endometrial stripe measuring 11.1 mm, abnormal for postmenopausal  patient. Recommend GYN consult. 3. Surgical removal of right ovary. Unremarkable left ovary. Thank you for your referral.       DEXA Results (maximum last 3):   Results from [place] encounter on 07/17/17   DEXA BONE DENSITY STUDY AXIAL    Narrative DEXA BONE DENSITOMETRY, CENTRAL    CPT CODE: 54907    INDICATION: Post menopausal. 44-year-old female for baseline study. Vitamin D  deficiency. Hypertension. Taking vitamin D.    TECHNIQUE: Using GE LUNAR Prodigy densitometer, bone density measurement was  performed in the lumbar spine, the proximal left and right femora. T Score  refers to standard deviations above or below average compared to a young adult  of the same sex. Z Score refers to standard deviations above or below average  compared to a patient of the same sex, age, race and weight. COMPARISON: None available. FINDINGS:     Lumbar Spine Levels: L1-L4  Mean Bone Mineral Density (BMD):  1.336 g/cm2    T Score: 1.1   Z Score: 1.3     Left Total Proximal Femur BMD: 1.102 g/cm2    T Score:  0.8    Z Score:  0.4      Right Total Proximal Femur BMD: 1.183 g/cm2   T Score:  1.4       Z Score:  1.1        Left Femoral Neck BMD:  0.978 g/cm2    T Score: -0.4  Z Score: -0.4      Right Femoral Neck BMD:  1.116 g/cm2    T Score: 0.6  Z Score: 0.6       Impression IMPRESSION:    BMD measures within normal limits. Based upon current ISCD guidelines, the patient's overall diagnostic category,  selected using WHO criteria in postmenopausal women and males aged 48 and above,  is selected based upon the lowest T Score from among the lumbar spine, total  femur, femoral neck, (or distal third radius if measured). WHO Definition of Osteoporosis and Osteopenia on DXA (specified for post  menopausal  females):      Normal:                     T Score at or above -1 SD    Osteopenia:              T Score between -1 and -2.5 SD    Osteoporosis:          T Score at or below -2.5 SD    The risk of fracture approximately doubles for each 1 SD decrease in T Score.    It is important to consider other factors in assessing a patient's risk of  fracture, including age, risk of falling/injury, history of fragility fracture,  family history of osteoporosis, smoking, low weight. Various fracture risk tools have been developed for adult patients and are  available online. For example, the FRAX tool developed by [facility] is widely used. Reference www.iscd.org. It is also important to note that DXA measures bone density but does not  distinguish among causes of decreased bone density, which include primary versus  secondary osteoporosis (such as metabolic bone disorders or possible effects of  medications) and also other conditions (such as osteomalacia). Clinical  considerations should determine what additional evaluation may be warranted to  exclude secondary conditions in a patient with low bone density. Please note that reliable, valid comparisons can not be made between studies  which have been performed on different densitometers. If clinically warranted,  follow up study performed at this site would best permit assessment of trend for  possible change in bone mineral density over time in comparison to this study. Thank you for this referral.       [facility] Results (maximum last 3): Results from [place] encounter on 10/19/20   [facility] MAMMO BI SCREENING INCL CAD    Narrative Bilateral Screening Mammogram Digital with CAD    HISTORY: Asymptomatic. COMPARISON: Mammogram 2015, 2017, 2018    TECHNIQUE: Digital views were obtained with computer assisted detection, iCAD  Second Look 7.2- H. FINDINGS:   There is no developing mass or suspicious calcifications. Impression IMPRESSION:    No evidence of malignancy. Annual mammography recommended as per the Society of Breast Imaging and the  [employer] of Radiology guidelines. BIRADS 1: Negative. Breast Density: B- There are scattered fibroglandular tissue densities. Results from [place] encounter on 08/21/18   [facility] 3D NOAH W MAMMO BI SCREENING INCL CAD    Narrative Digital Screening Mammogram with Tomosynthesis    History: Screening.     Comparison: 2015, 2017    Technique: Digital mammography (2D and 3D Tomosynthesis) with CAD was performed. Routine views obtained. Findings:   No developing masses or suspicious calcifications are seen. Impression Impression:    No evidence for malignancy. Suggest routine follow-up with annual mammographic screening. BIRADS 1 : Negative    The breasts are almost entirely fatty. Thank you for this referral.      Results from [place] encounter on 07/17/17   MARY 3D NOAH W MAMMO BI SCREENING INCL CAD    Narrative Bilateral Screening Mammogram Digital with CAD    HISTORY: Asymptomatic    COMPARISON: Mammogram 2015    TECHNIQUE: Digital CC and MLO views of both breasts with computer assisted  detection, iCAD Second Look 7.2- H. FINDINGS:   No new focal mass is seen. There is no distortion . There are no suspicious  calcifications. Impression IMPRESSION:    No demonstration of malignancy. Annual mammography recommended as per the Society of Breast Imaging and the  [employer] of [employer] guidelines. BIRADS 2: Benign finding(s)    Breast composition:  There are scattered areas of fibroglandular density. IR Results (maximum last 3): No results found for this or any previous visit. VAS/US Results (maximum last 3): No results found for this or any previous visit. PET Results (maximum last 3): No results found for this or any previous visit. No results found for this or any previous visit. @LASTPROCAMB([id number])    CULTURE:   )  Recent Labs     03/18/21  0630 03/18/21  0130 03/18/21  0129   CULT PENDING GRAM POSITIVE COCCI IN CLUSTERS GROWING IN THE PEDIATRIC BOTTLE DRAWN (NO SITE INDICATED)* CULTURE IN PROGRESS,FURTHER UPDATES TO FOLLOW Sent to [facility] for ID/Susceptibility if indicated.      Recent Labs     03/18/21  0630 03/18/21  0130 03/18/21  0129   CULT PENDING GRAM POSITIVE COCCI IN CLUSTERS GROWING IN THE PEDIATRIC BOTTLE DRAWN (NO SITE INDICATED)* CULTURE IN [facility] UPDATES TO FOLLOW Sent to [facility] for ID/Susceptibility if indicated. Physical Assessment:     Visit Vitals  BP (!) 108/58 (BP 1 Location: Right lower arm, BP Patient Position: At rest)   Pulse 82   Temp 97.7 °F (36.5 °C)   Resp 18   Ht 5' 4.02\" (1.626 m)   Wt 106.9 kg (235 lb 9.6 oz)   SpO2 100%   BMI 40.42 kg/m²     Last 3 Recorded Weights in this Encounter    03/18/21 0027 03/19/21 0000   Weight: 111.1 kg (245 lb) 106.9 kg (235 lb 9.6 oz)       Intake/Output Summary (Last 24 hours) at 3/19/2021 1525  Last data filed at 3/19/2021 0819  Gross per 24 hour   Intake 650 ml   Output 1400 ml   Net -750 ml       Physial Exam:  General appearance: alert, cooperative  Skin: normal coloration and turgor, no rashes, no suspicious skin lesions noted. HEENT: Head; normocephalic, atraumatic. PHYLICIA. ENT- ENT exam normal, no neck nodes or sinus tenderness. Lungs: clear to auscultation bilaterally  Heart: S1, S2 normal  Abdomen: soft, non-tender. Bowel sounds normal. No masses,  no organomegaly  Extremities: no edema    PLAN / RECOMMENDATION:    Acute/crf related to sepsis,improving with iv fluids. hold entresto  Sepsis,blood cultures are growing gram pos cocci in clusters  crf stage 3,proteinuria most likely related to dm,check serologies ,spep  Transient rash ? Related to neurontin or sepsis,check urine eosinophils ? Interstitial nephritis     Thank you for the consultation to participate in patient's care. I have personally discussed my plan with the referring physician.      Terral Opitz, MD  March 19, 2021

## 2021-03-19 NOTE — H&P
History and Physical    Patient: Aubrey Eisenmenger MRN: [id number]  SSN: [id number]    YOB: 1950  Age: 78 y.o. Sex: female      Jenny Villasenor MD    C/C : Fever / pruritus    Subjective:      Aubrey Eisenmenger is a 78 y.o. female with past medical history of CAD s/p 2 MI in the past s/p stent in 2020/diabetes mellitus type 2 uncontrolled high blood glucose/morbid obesity/hypertension/COPD/CKD 3 4 being admitted with history of increasing pruritus generalized along with likely developing high fever, severe oropharyngeal pain burning pain. Patient is historian other history obtained from discussion with the ED physician and chart review    According to patient she was fine about a week ago but then more on Monday about 5 to 6 days ago she developed pruritus, also also last 3 full days she is not feeling well and lately in the last 24 hours she developed very high fevers, along with oropharyngeal pain burning pain unable to eat drink due to pain, patient went to the emergency room at [facility] where she was found to have rash with pruritus generalized, high fevers, hypotension, CARA on CKD 3 4 Covid about fire was negative-patient admitted for sepsis of unknown etiology    Full code    Past Medical History:   Diagnosis Date    Abnormal WBC count 5/17/2016    Anemia     Bilateral shoulder pain 9/27/2016    Chondromalacia of both patellae     COPD (chronic obstructive pulmonary disease) ([address] 9/2015    mild-mod dz; Dr Arturo Pires    Diabetes [facility]) 2013    Diabetic eye exam ([address] 2016    Dilated cardiomyopathy ([address]     Dyslipidemia     Gout     Heart attack ([address]     Heart attack ([address]     Heart attack ([address] 10/18/2019    History of echocardiogram 11/14/2014    Mild LVE. EF 40%. Mild, diffuse hypk. Mild LAE.   Mild MR.      Hypercholesteremia 1/08/14    Hypertension 2000    Noncompliance with medications 2/16/2016    Obesity     Orthostatic hypotension 5/17/2016    Osteoarthritis of both knees     Pain management 2016    Dr. Shereen Martin Popliteal cyst, bilateral      Vitamin D deficiency 10/16/14     Past Surgical History:   Procedure Laterality Date    HX HEART CATHETERIZATION      HX TUBAL LIGATION        Family History   Problem Relation Age of Onset    Diabetes Mother     Hypertension Mother     Hypertension Father     Kidney Disease Maternal Aunt 48        Dialysis     Social History     Tobacco Use    Smoking status: Former Smoker     Packs/day: 0.25     Years: 48.00     Pack years: 12.00     Types: Cigarettes     Quit date: 2018     Years since quittin.8    Smokeless tobacco: Never Used   Substance Use Topics    Alcohol use: No     Alcohol/week: 0.0 standard drinks      Prior to Admission medications    Medication Sig Start Date End Date Taking? Authorizing Provider   atorvastatin (LIPITOR) 80 mg tablet Take 1 Tab by mouth daily. 21   Elizabeth Clayton MD   carvediloL (COREG) 25 mg tablet Take 1 Tab by mouth two (2) times daily (with meals). 21   Elizabeth Clayton MD   sacubitriL-valsartan Andrés Nam)  mg tablet Take 1 Tab by mouth two (2) times a day. 21   Elizabeth Clayton MD   furosemide (LASIX) 40 mg tablet Take 1 Tab by mouth two (2) times daily as needed (edema). 21   Elizabeth Clayton MD   tiZANidine (ZANAFLEX) 4 mg tablet Take 4 mg by mouth two (2) times a day. Provider, Una   lidocaine (Lidoderm) 5 % Apply patch to the affected area for 12 hours a day and remove for 12 hours a day. 10/22/20   LORE Jenkins   pantoprazole (PROTONIX) 40 mg tablet Take 1 Tab by mouth daily. 10/27/19   Robbie Abbasi DO   albuterol (PROVENTIL HFA, VENTOLIN HFA, PROAIR HFA) 90 mcg/actuation inhaler Take 2 Puffs by inhalation every four (4) hours as needed for Wheezing. 19   Perez Horn MD   aspirin 81 mg chewable tablet Take 2 Tabs by mouth daily.  19   Kendra Rabago NP   insulin detemir (LEVEMIR) 100 unit/mL injection 15 units daily for diabetes 7/20/17   Cuate DOMINGUEZ [place]   desloratadine (CLARINEX) 5 mg tablet Take 1 Tab by mouth daily as needed for Allergies. 4/26/17   Roseanne Irizarry DNP   mometasone (NASONEX) 50 mcg/actuation nasal spray 2 Sprays by Both Nostrils route daily as needed. For allergies 4/26/17   Sushila Irizarry DNP   umeclidinium-vilanterol (ANORO ELLIPTA) 62.5-25 mcg/actuation inhaler Take 1 Puff by inhalation daily. For COPD 4/26/17   Bishnu Engle DNP        No Known Allergies        Review of Systems:  positive responses in bold type   Constitutional: Negative for fever, chills, diaphoresis and unexpected weight change. HENT: Negative for ear pain, congestion, sore throat, rhinorrhea, drooling, trouble swallowing, neck pain and tinnitus. Eyes: Negative for photophobia, pain, redness and visual disturbance. Respiratory: negative for shortness of breath, cough, choking, chest tightness, wheezing or stridor. Cardiovascular: Negative for chest pain, palpitations and leg swelling. Gastrointestinal: Negative for nausea, vomiting, abdominal pain, diarrhea, constipation, blood in stool, abdominal distention and anal bleeding. Genitourinary: Negative for dysuria, urgency, frequency, hematuria, flank pain and difficulty urinating. Musculoskeletal: Negative for back pain and arthralgias. Skin: Dry skin I do not appreciate any rash, severe fungal skin infection under breast inguinal area  Neurological: Negative for dizziness, seizures, syncope, speech difficulty, light-headedness or headaches. Hematological: Does not bruise/bleed easily. Psychiatric/Behavioral: Negative for suicidal ideas, hallucinations, behavioral problems, self-injury or agitation         Objective: Body mass index is 42.03 kg/m².   Vitals:    03/18/21 1815 03/18/21 1839 03/18/21 1845 03/18/21 2000   BP: 106/87  116/81 138/65   Pulse: 87 87 88 87   Resp: 20 24 23 24   Temp:    97.9 °F (36.6 °C)   SpO2: 100% 98%  97%   Weight:       Height:            Physical Exam:  General appearance - alert, well appearing, and in no distress, oriented to person, place, and time and overweight  Mental status - alert, oriented to person, place, and time  Eyes - pupils equal and reactive, extraocular eye movements intact  Ears - bilateral TM's and external ear canals normal  Nose - normal and patent, no erythema, discharge or polyps  Mouth - mucous membranes moist, pharynx normal without lesions  Neck - supple, no significant adenopathy  Chest - clear to auscultation, no wheezes, rales or rhonchi, symmetric air entry  Heart - normal rate, regular rhythm, normal S1, S2, no murmurs, rubs, clicks or gallops  Abdomen - soft, nontender, nondistended, no masses or organomegaly  Breasts -bilateral and the skin severe excoriation and painful shallow abrasions ulcers  Neurological - alert, oriented, normal speech, no focal findings or movement disorder noted  Musculoskeletal - no joint tenderness, deformity or swelling  Extremities - peripheral pulses normal, no pedal edema, no clubbing or cyanosis  Skin - normal coloration and turgor, no rashes, no suspicious skin lesions noted          Hospital Problems  Date Reviewed: 1/28/2021          Codes Class Noted POA    Sepsis ([facility] ICD-10-CM: A41.9  ICD-9-CM: 038.9, 995.91  3/18/2021 Unknown        CARA (acute kidney injury) ([facility] ICD-10-CM: N17.9  ICD-9-CM: 584.9  3/18/2021 Unknown              CBC:  Lab Results   Component Value Date/Time    WBC 7.2 03/18/2021 06:08 PM    HGB 10.0 (L) 03/18/2021 06:08 PM    HCT 32.3 (L) 03/18/2021 06:08 PM    PLATELET 412 64/67/0302 06:08 PM    MCV 89.0 03/18/2021 06:08 PM        CMP:  Lab Results   Component Value Date/Time    Sodium 138 03/18/2021 06:08 PM    Potassium 3.9 03/18/2021 06:08 PM    Chloride 106 03/18/2021 06:08 PM    CO2 20 (L) 03/18/2021 06:08 PM    Anion gap 12 03/18/2021 06:08 PM    Glucose 280 (H) 03/18/2021 06:08 PM    BUN 46 (H) 03/18/2021 06:08 PM    Creatinine 5.23 (H) 03/18/2021 06:08 PM    BUN/Creatinine ratio 9 (L) 03/18/2021 06:08 PM    GFR est AA 10 (L) 03/18/2021 06:08 PM    GFR est non-AA 8 (L) 03/18/2021 06:08 PM    Calcium 7.9 (L) 03/18/2021 06:08 PM    Alk. phosphatase 147 (H) 03/18/2021 01:29 AM    Protein, total 6.0 (L) 03/18/2021 01:29 AM    Albumin 2.7 (L) 03/18/2021 01:29 AM    Globulin 3.3 03/18/2021 01:29 AM    A-G Ratio 0.8 03/18/2021 01:29 AM    ALT (SGPT) 83 (H) 03/18/2021 01:29 AM        PT/INR  Lab Results   Component Value Date/Time    INR 1.0 10/19/2019 01:25 AM    INR 1.0 06/08/2019 04:35 PM    INR 1.0 09/23/2018 04:35 PM    Prothrombin time 13.2 10/19/2019 01:25 AM    Prothrombin time 13.2 06/08/2019 04:35 PM    Prothrombin time 12.5 09/23/2018 04:35 PM            EKG:   Results for orders placed or performed in visit on 08/23/18   [facility] POC EKG ROUTINE W/ 12 LEADS, INTER & REP     Status: None    Impression    See progress note. Assessment And  Plan:      1 Pruritus   2 Fever   3 DM2 uncontrolled high blood glucose   4 Fungal skin infection   5 CAD - S/P 2 MI and s\STENT - last in 2019 ( per patient )   6 HTN   7 Morbid obesity Body mass index is 42.03 kg/m². 8 COPD   9 CKD4-5   10 Anemia of chronic illness   11 Glossitis   12 UTI- POA   13 Elevated LFT - US - Liver GB ( pending ) ? Fatty liver   14 Negative COVID - [employer]   15 Sepsis - Fever+ tachycardia + Renal failure + hypotension   16 acute encephalopathy likely acute metabolic encephalopathy improved-       Plan    -Sepsis/septic shock-initially plan was to continue on vasopressors however blood pressure improved broad-spectrum antibiotics after cultures have been started in ED fluid challenge has been given monitor blood cultures ID consultation.   Due to AMS which was noted initially patient had an LP done which was negative for organism WBC CSF culture is pending so has blood cultures and urine cultures    -Rash pruritus-Benadryl/monitor clinically/LFT mild elevation but no elevation of bilirubin noted repeat ordered in the morning. ?  Dry skin? Secondary to new drug allergy?   Which drug    -Fungal skin infection-nystatin under breast and skin folds    -Oral nystatin suspension for    -Patient's other conditions hypertension diabetes CAD and COPD are fairly under control appropriate management ordered    -Follow ultrasound of liver and gallbladder and  liver    Signed By: Tracy Washburn MD     March 18, 2021

## 2021-03-19 NOTE — DIABETES MGMT
Diabetes Plan of Care    T2DM with A1c of 7.6 (3/18/2021)  Home diabetes medications: Patient reported on 3/19:  Levemir insulin 15 units daily at bedtime    Patient was admitted on 3/17/2021 with report of fever, fatigue, cough, lethargy, and shortness of breath. Noted the following assessment:  COPD with acute exacerbation  Sepsis  T2DM with nephropathy  CARA    3/19: Seen patient this morning in [address] [place] [address] and reported that she's levemir insulin daily at home. Total dose insulin 3/18: 16 units (sliding scale lispro)    Recommendation(s): add basal lantus insulin 10 units daily starting today. Order obtained. Assessment:    POC BG 3/18: 247, 259, 276, 239  Patient received a total of 125 mg IV Solumedrol    POC BG 3/19 at time of review: 144, 159  Patient is no longer on steroids    Lab FBG 3/19: 147    Most recent blood glucose values: Within target range : Yes, progressing on 3/19    Current A1c 7.6 (3/18/2021) which is equivalent to estimated average blood glucose of 171 mg/dL during the past 2-3 months    Adequate glycemic control PTA:  Yes. Current hospital diabetes medications:  Basal lantus insulin 10 units daily at bedtime, first dose ordered 3/19/2021  Correctional lispro insulin ACHS.  Modified to very resistant dose    TDD previous day 3/18:  Lispro: 16 units     Diet: Diabetic consistent carb 1800kcal; regular; 2 Gm NA    Goals: Blood glucose will be within target of 70 - 180 mg/dl by: 3/22/2021    Education:  _____ Refer to Diabetes Education Record                       __X___ Education not indicated at this time     Radha Terry RN [facility]  Pager: [phone]

## 2021-03-19 NOTE — PROGRESS NOTES
Problem: Falls - Risk of  Goal: *Absence of Falls  Description: Document Brandee Varghese Fall Risk and appropriate interventions in the flowsheet.   3/19/2021 0615 by Heber Newsome RN  Outcome: Progressing Towards Goal  Note: Fall Risk Interventions:  Mobility Interventions: Bed/chair exit alarm, OT consult for ADLs, Patient to call before getting OOB, PT Consult for mobility concerns, PT Consult for assist device competence, Strengthening exercises (ROM-active/passive), Utilize walker, cane, or other assistive device         Medication Interventions: Bed/chair exit alarm, Patient to call before getting OOB, Teach patient to arise slowly    Elimination Interventions: Bed/chair exit alarm, Call light in reach, Patient to call for help with toileting needs    History of Falls Interventions: Bed/chair exit alarm, Vital signs minimum Q4HRs X 24 hrs (comment for end date)      3/19/2021 0615 by Heber Newsome RN  Outcome: Progressing Towards Goal  Note: Fall Risk Interventions:  Mobility Interventions: Bed/chair exit alarm, OT consult for ADLs, Patient to call before getting OOB, PT Consult for mobility concerns, PT Consult for assist device competence, Strengthening exercises (ROM-active/passive), Utilize walker, cane, or other assistive device         Medication Interventions: Bed/chair exit alarm, Patient to call before getting OOB, Teach patient to arise slowly    Elimination Interventions: Bed/chair exit alarm, Call light in reach, Patient to call for help with toileting needs    History of Falls Interventions: Bed/chair exit alarm, Vital signs minimum Q4HRs X 24 hrs (comment for end date)         Problem: Patient Education: Go to Patient Education Activity  Goal: Patient/Family Education  3/19/2021 0615 by Heber Newsome RN  Outcome: Progressing Towards Goal  3/19/2021 0615 by Heber Newsome RN  Outcome: Progressing Towards Goal     Problem: Diabetes Self-Management  Goal: *Disease process and treatment process  Description: Define diabetes and identify own type of diabetes; list 3 options for treating diabetes. 3/19/2021 0615 by Aye Fox RN  Outcome: Progressing Towards Goal  3/19/2021 0615 by Aye Fox RN  Outcome: Progressing Towards Goal  Goal: *Incorporating nutritional management into lifestyle  Description: Describe effect of type, amount and timing of food on blood glucose; list 3 methods for planning meals. 3/19/2021 0615 by Aye Fox RN  Outcome: Progressing Towards Goal  3/19/2021 0615 by Aye Fox RN  Outcome: Progressing Towards Goal  Goal: *Incorporating physical activity into lifestyle  Description: State effect of exercise on blood glucose levels. 3/19/2021 0615 by Aye Fox RN  Outcome: Progressing Towards Goal  3/19/2021 0615 by Aye Fox RN  Outcome: Progressing Towards Goal  Goal: *Developing strategies to promote health/change behavior  Description: Define the ABC's of diabetes; identify appropriate screenings, schedule and personal plan for screenings. 3/19/2021 0615 by Aye Fox RN  Outcome: Progressing Towards Goal  3/19/2021 0615 by Aye Fox RN  Outcome: Progressing Towards Goal  Goal: *Using medications safely  Description: State effect of diabetes medications on diabetes; name diabetes medication taking, action and side effects. 3/19/2021 0615 by Aye Fox RN  Outcome: Progressing Towards Goal  3/19/2021 0615 by Aye Fox RN  Outcome: Progressing Towards Goal  Goal: *Monitoring blood glucose, interpreting and using results  Description: Identify recommended blood glucose targets  and personal targets.   3/19/2021 0615 by Aye Fox RN  Outcome: Progressing Towards Goal  3/19/2021 0615 by Aye Fox RN  Outcome: Progressing Towards Goal  Goal: *Prevention, detection, treatment of acute complications  Description: List symptoms of hyper- and hypoglycemia; describe how to treat low blood sugar and actions for lowering  high blood glucose level. 3/19/2021 0615 by Dontrell Strickland RN  Outcome: Progressing Towards Goal  3/19/2021 0615 by Dontrell Strickland RN  Outcome: Progressing Towards Goal  Goal: *Prevention, detection and treatment of chronic complications  Description: Define the natural course of diabetes and describe the relationship of blood glucose levels to long term complications of diabetes. 3/19/2021 0615 by Dontrell Strickland RN  Outcome: Progressing Towards Goal  3/19/2021 0615 by Dontrell Strickland RN  Outcome: Progressing Towards Goal  Goal: *Developing strategies to address psychosocial issues  Description: Describe feelings about living with diabetes; identify support needed and support network  3/19/2021 0615 by Dontrell Strickland RN  Outcome: Progressing Towards Goal  3/19/2021 0615 by Dontrell Strickland RN  Outcome: Progressing Towards Goal  Goal: *Insulin pump training  3/19/2021 0615 by Dontrell Strickland RN  Outcome: Progressing Towards Goal  3/19/2021 0615 by Dontrell Strickland RN  Outcome: Progressing Towards Goal  Goal: *Sick day guidelines  3/19/2021 0615 by Dontrell Strickland RN  Outcome: Progressing Towards Goal  3/19/2021 0615 by Dontrell Strickland RN  Outcome: Progressing Towards Goal  Goal: *Patient Specific Goal (EDIT GOAL, INSERT TEXT)  3/19/2021 0615 by Dontrell Strickland RN  Outcome: Progressing Towards Goal  3/19/2021 0615 by Dontrell Strickland RN  Outcome: Progressing Towards Goal     Problem: Patient Education: Go to Patient Education Activity  Goal: Patient/Family Education  3/19/2021 0615 by Dontrell Strickland RN  Outcome: Progressing Towards Goal  3/19/2021 0615 by Dontrell Strickland RN  Outcome: Progressing Towards Goal     Problem: Pressure Injury - Risk of  Goal: *Prevention of pressure injury  Description: Document Vladislav Scale and appropriate interventions in the flowsheet.   3/19/2021 0615 by Dontrell Strickland RN  Outcome: Progressing Towards Goal  Note: Pressure Injury Interventions: Activity Interventions: Increase time out of bed, Pressure redistribution bed/mattress(bed type), PT/OT evaluation         Nutrition Interventions: Document food/fluid/supplement intake, Offer support with meals,snacks and hydration    Friction and Shear Interventions: Apply protective barrier, creams and emollients, HOB 30 degrees or less, Lift team/patient mobility team, Minimize layers             3/19/2021 0615 by Melchor Thorne RN  Outcome: Progressing Towards Goal  Note: Pressure Injury Interventions:             Activity Interventions: Increase time out of bed, Pressure redistribution bed/mattress(bed type), PT/OT evaluation         Nutrition Interventions: Document food/fluid/supplement intake, Offer support with meals,snacks and hydration    Friction and Shear Interventions: Apply protective barrier, creams and emollients, HOB 30 degrees or less, Lift team/patient mobility team, Minimize layers                Problem: Patient Education: Go to Patient Education Activity  Goal: Patient/Family Education  Outcome: Progressing Towards Goal     Problem: Sepsis: Day of Diagnosis  Goal: Off Pathway (Use only if patient is Off Pathway)  Outcome: Progressing Towards Goal  Goal: *Fluid resuscitation  Outcome: Progressing Towards Goal  Goal: *Paired blood cultures prior to first dose of antibiotic  Outcome: Progressing Towards Goal  Goal: *First dose of  appropriate antibiotic within 3 hours of arrival to ED, within 1 hour of arrival to ICU  Outcome: Progressing Towards Goal  Goal: *Lactic acid with first set of blood cultures  Outcome: Progressing Towards Goal  Goal: *Pneumococcal immunization (if eligible)  Outcome: Progressing Towards Goal  Goal: *Influenza immunization (if eligible)  Outcome: Progressing Towards Goal  Goal: Activity/Safety  Outcome: Progressing Towards Goal  Goal: Consults, if ordered  Outcome: Progressing Towards Goal  Goal: Diagnostic Test/Procedures  Outcome: Progressing Towards Goal  Goal: Nutrition/Diet  Outcome: Progressing Towards Goal  Goal: Discharge Planning  Outcome: Progressing Towards Goal  Goal: Medications  Outcome: Progressing Towards Goal  Goal: Respiratory  Outcome: Progressing Towards Goal  Goal: Treatments/Interventions/Procedures  Outcome: Progressing Towards Goal  Goal: Psychosocial  Outcome: Progressing Towards Goal     Problem: Sepsis: Day 2  Goal: Off Pathway (Use only if patient is Off Pathway)  Outcome: Progressing Towards Goal  Goal: *Central Venous Pressure maintained at 8-12 mm Hg  Outcome: Progressing Towards Goal  Goal: *Hemodynamically stable  Outcome: Progressing Towards Goal  Goal: *Tolerating diet  Outcome: Progressing Towards Goal  Goal: Activity/Safety  Outcome: Progressing Towards Goal  Goal: Consults, if ordered  Outcome: Progressing Towards Goal  Goal: Diagnostic Test/Procedures  Outcome: Progressing Towards Goal  Goal: Nutrition/Diet  Outcome: Progressing Towards Goal  Goal: Discharge Planning  Outcome: Progressing Towards Goal  Goal: Medications  Outcome: Progressing Towards Goal  Goal: Respiratory  Outcome: Progressing Towards Goal  Goal: Treatments/Interventions/Procedures  Outcome: Progressing Towards Goal  Goal: Psychosocial  Outcome: Progressing Towards Goal     Problem: Sepsis: Day 3  Goal: Off Pathway (Use only if patient is Off Pathway)  Outcome: Progressing Towards Goal  Goal: *Central Venous Pressure maintained at 8-12 mm Hg  Outcome: Progressing Towards Goal  Goal: *Oxygen saturation within defined limits  Outcome: Progressing Towards Goal  Goal: *Vital sign stability  Outcome: Progressing Towards Goal  Goal: *Tolerating diet  Outcome: Progressing Towards Goal  Goal: *Demonstrates progressive activity  Outcome: Progressing Towards Goal  Goal: Activity/Safety  Outcome: Progressing Towards Goal  Goal: Consults, if ordered  Outcome: Progressing Towards Goal  Goal: Diagnostic Test/Procedures  Outcome: Progressing Towards Goal  Goal: Nutrition/Diet  Outcome: Progressing Towards Goal  Goal: Discharge Planning  Outcome: Progressing Towards Goal  Goal: Medications  Outcome: Progressing Towards Goal  Goal: Respiratory  Outcome: Progressing Towards Goal  Goal: Treatments/Interventions/Procedures  Outcome: Progressing Towards Goal  Goal: Psychosocial  Outcome: Progressing Towards Goal     Problem: Sepsis: Day 4  Goal: Off Pathway (Use only if patient is Off Pathway)  Outcome: Progressing Towards Goal  Goal: Activity/Safety  Outcome: Progressing Towards Goal  Goal: Consults, if ordered  Outcome: Progressing Towards Goal  Goal: Diagnostic Test/Procedures  Outcome: Progressing Towards Goal  Goal: Nutrition/Diet  Outcome: Progressing Towards Goal  Goal: Discharge Planning  Outcome: Progressing Towards Goal  Goal: Medications  Outcome: Progressing Towards Goal  Goal: Respiratory  Outcome: Progressing Towards Goal  Goal: Treatments/Interventions/Procedures  Outcome: Progressing Towards Goal  Goal: Psychosocial  Outcome: Progressing Towards Goal  Goal: *Oxygen saturation within defined limits  Outcome: Progressing Towards Goal  Goal: *Hemodynamically stable  Outcome: Progressing Towards Goal  Goal: *Vital signs within defined limits  Outcome: Progressing Towards Goal  Goal: *Tolerating diet  Outcome: Progressing Towards Goal  Goal: *Demonstrates progressive activity  Outcome: Progressing Towards Goal  Goal: *Fluid volume maintenance  Outcome: Progressing Towards Goal     Problem: Sepsis: Day 5  Goal: Off Pathway (Use only if patient is Off Pathway)  Outcome: Progressing Towards Goal  Goal: *Oxygen saturation within defined limits  Outcome: Progressing Towards Goal  Goal: *Vital signs within defined limits  Outcome: Progressing Towards Goal  Goal: *Tolerating diet  Outcome: Progressing Towards Goal  Goal: *Demonstrates progressive activity  Outcome: Progressing Towards Goal  Goal: *Discharge plan identified  Outcome: Progressing Towards Goal  Goal: Activity/Safety  Outcome: Progressing Towards Goal  Goal: Consults, if ordered  Outcome: Progressing Towards Goal  Goal: Diagnostic Test/Procedures  Outcome: Progressing Towards Goal  Goal: Nutrition/Diet  Outcome: Progressing Towards Goal  Goal: Discharge Planning  Outcome: Progressing Towards Goal  Goal: Medications  Outcome: Progressing Towards Goal  Goal: Respiratory  Outcome: Progressing Towards Goal  Goal: Treatments/Interventions/Procedures  Outcome: Progressing Towards Goal  Goal: Psychosocial  Outcome: Progressing Towards Goal     Problem: Sepsis: Day 6  Goal: Off Pathway (Use only if patient is Off Pathway)  Outcome: Progressing Towards Goal  Goal: *Oxygen saturation within defined limits  Outcome: Progressing Towards Goal  Goal: *Vital signs within defined limits  Outcome: Progressing Towards Goal  Goal: *Tolerating diet  Outcome: Progressing Towards Goal  Goal: *Demonstrates progressive activity  Outcome: Progressing Towards Goal  Goal: Influenza immunization  Outcome: Progressing Towards Goal  Goal: *Pneumococcal immunization  Outcome: Progressing Towards Goal  Goal: Activity/Safety  Outcome: Progressing Towards Goal  Goal: Diagnostic Test/Procedures  Outcome: Progressing Towards Goal  Goal: Nutrition/Diet  Outcome: Progressing Towards Goal  Goal: Discharge Planning  Outcome: Progressing Towards Goal  Goal: Medications  Outcome: Progressing Towards Goal  Goal: Respiratory  Outcome: Progressing Towards Goal  Goal: Treatments/Interventions/Procedures  Outcome: Progressing Towards Goal  Goal: Psychosocial  Outcome: Progressing Towards Goal     Problem: Sepsis: Discharge Outcomes  Goal: *Vital signs within defined limits  Outcome: Progressing Towards Goal  Goal: *Tolerating diet  Outcome: Progressing Towards Goal  Goal: *Verbalizes understanding and describes prescribed diet  Outcome: Progressing Towards Goal  Goal: *Demonstrates progressive activity  Outcome: Progressing Towards Goal  Goal: *Describes follow-up/return visits to physicians  Outcome: Progressing Towards Goal  Goal: *Verbalizes name, dosage, time, side effects, and number of days to continue medications  Outcome: Progressing Towards Goal  Goal: *Influenza immunization (Oct-Mar only)  Outcome: Progressing Towards Goal  Goal: *Pneumococcal immunization  Outcome: Progressing Towards Goal  Goal: *Lungs clear or at baseline  Outcome: Progressing Towards Goal  Goal: *Oxygen saturation returns to baseline or 90% or better on room air  Outcome: Progressing Towards Goal  Goal: *Glycemic control  Outcome: Progressing Towards Goal  Goal: *Absence of deep venous thrombosis signs and symptoms(Stroke Metric)  Outcome: Progressing Towards Goal  Goal: *Describes available resources and support systems  Outcome: Progressing Towards Goal  Goal: *Optimal pain control at patient's stated goal  Outcome: Progressing Towards Goal

## 2021-03-19 NOTE — PROGRESS NOTES
Progress Note      Patient: Lesvia Lugo               Sex: female          DOA: 3/17/2021       YOB: 1950      Age:  78 y.o.        LOS:  LOS: 1 day             CHIEF COMPLAINT:  Sepsis with shock treated with vasopressor in the setting of chronic ischemic heart disease. She is COVID negative. Subjective:     Patient feels okay  Some SOB, no significant cough  No chest pain    Objective:      Visit Vitals  BP (!) 108/58 (BP 1 Location: Right lower arm, BP Patient Position: At rest)   Pulse 82   Temp 97.7 °F (36.5 °C)   Resp 18   Ht 5' 4.02\" (1.626 m)   Wt 106.9 kg (235 lb 9.6 oz)   SpO2 100%   BMI 40.42 kg/m²       Physical Exam:  Gen:  Patient is in no distress. No complaint  HEENT and Neck:  PERRL, No cervical tenderness or masses  Lungs:  Clear bilaterally. No rales, no wheeze. Normal effort. Heart:  Regular Rate and Rhythm. No murmur or gallop. No JVD. No edema.   Abdomen:  Soft, non tender, no masses, no Hepatosplenomegally  Extremities:  No digital clubbing, no cyanosis  Neuro:  Alert and oriented, Normal affect, Cranial nerves intact, No sensory deficits        Lab/Data Reviewed:  BMP:   Lab Results   Component Value Date/Time     03/19/2021 04:00 AM    K 4.4 03/19/2021 04:00 AM     03/19/2021 04:00 AM    CO2 21 03/19/2021 04:00 AM    AGAP 8 03/19/2021 04:00 AM     (H) 03/19/2021 04:00 AM    BUN 48 (H) 03/19/2021 04:00 AM    CREA 4.32 (H) 03/19/2021 04:00 AM    GFRAA 12 (L) 03/19/2021 04:00 AM    GFRNA 10 (L) 03/19/2021 04:00 AM     CBC:   Lab Results   Component Value Date/Time    WBC 7.2 03/18/2021 06:08 PM    HGB 10.0 (L) 03/18/2021 06:08 PM    HCT 32.3 (L) 03/18/2021 06:08 PM     03/18/2021 06:08 PM           Assessment/Plan     Principal Problem:    Sepsis ([facility] (3/18/2021)    Active Problems:    Hypotension (3/19/2021)      COPD with acute exacerbation ([facility] (10/19/2019)      CARA (acute kidney injury) ([facility] (3/18/2021)      Essential hypertension (9/17/2015)      Type 2 diabetes with nephropathy ([facility] (7/12/2018)      Ischemic cardiomyopathy (9/9/2019)      CAD (coronary artery disease) (10/18/2019)      Overview: Added automatically from request for surgery [id number]      Dyslipidemia, goal LDL below 70 (10/22/2015)      Severe obesity (BMI 35.0-39. 9) with comorbidity ([facility] (7/12/2018)        Plan:  Continue to support BP  Follow renal function which appears to be improving  Continue broad spectrum antibiotics   Follow cultures  Follow respiratory status. Patient takes Entresto 97/103, we are waiting for improved BP to resume  BS control  Continue respiratory treatments  Mobilize as able.

## 2021-03-19 NOTE — PROGRESS NOTES
Bedside and Verbal shift change report given to Richard Post (oncoming nurse) by Chago Salas RN (offgoing nurse). Report included the following information SBAR, Kardex, MAR and Recent Results. SITUATION:    Code Status: Prior   Reason for Admission: Sepsis ([facility] [A41.9]   CARA (acute kidney injury) ([facility] [N17.9]    [facility] day: 1   Problem List:       Hospital Problems  Date Reviewed: 1/28/2021          Codes Class Noted POA    Sepsis ([facility] ICD-10-CM: A41.9  ICD-9-CM: 038.9, 995.91  3/18/2021 Unknown        CARA (acute kidney injury) ([facility] ICD-10-CM: N17.9  ICD-9-CM: 584.9  3/18/2021 Unknown              BACKGROUND:    Past Medical History:   Past Medical History:   Diagnosis Date    Abnormal WBC count 5/17/2016    Anemia     Bilateral shoulder pain 9/27/2016    Chondromalacia of both patellae     COPD (chronic obstructive pulmonary disease) ([facility] 9/2015    mild-mod dz; Dr Mick Escobar    Diabetes [facility]) 2013    Diabetic eye exam ([facility] 2016    Dilated cardiomyopathy ([facility]     Dyslipidemia     Gout     Heart attack ([facility]     Heart attack ([facility]     Heart attack ([facility] 10/18/2019    History of echocardiogram 11/14/2014    Mild LVE. EF 40%. Mild, diffuse hypk. Mild LAE.   Mild MR.      Hypercholesteremia 1/08/14    Hypertension 2000    Noncompliance with medications 2/16/2016    Obesity     Orthostatic hypotension 5/17/2016    Osteoarthritis of both knees     Pain management 04/01/2016    Dr. Shi Forward Popliteal cyst, bilateral      Vitamin D deficiency 10/16/14         Patient taking anticoagulants yes     ASSESSMENT:    Changes in Assessment Throughout Shift: none     Patient has Central Line: yes Reasons if yes: limited vessels suitable for peripheral use   Patient has Frye Cath: yes Reasons if yes: accurate I&O's      Last Vitals:     Vitals:    03/18/21 1845 03/18/21 2000 03/19/21 0000 03/19/21 0400   BP: 116/81 138/65 (!) 125/59 (!) 113/59   Pulse: 88 87 84 78   Resp: 23 24 18 16   Temp:  97.9 °F (36.6 °C) 97.3 °F (36.3 °C) 97.8 °F (36.6 °C)   SpO2:  97% 98% 98%   Weight:   106.9 kg (235 lb 9.6 oz)    Height:            IV and DRAINS (will only show if present)   Triple Lumen 7FR, 16CM 03/18/21 Left Internal jugular-Site Assessment: Clean, dry, & intact  Peripheral IV 03/18/21 Left Hand-Site Assessment: Clean, dry, & intact     WOUND (if present)   Wound Type:  none   Dressing present Dressing Present : Yes   Wound Concerns/Notes:  none     PAIN    Pain Assessment    Pain Intensity 1: 0 (03/19/21 0400)              Patient Stated Pain Goal: 0  o Interventions for Pain:  none  o Intervention effective: n/a  o Time of last intervention: n/a  o    o Reassessment Completed: yes      Last 3 Weights:  Last 3 Recorded Weights in this Encounter    03/18/21 0027 03/19/21 0000   Weight: 111.1 kg (245 lb) 106.9 kg (235 lb 9.6 oz)     Weight change: -4.264 kg (-9 lb 6.4 oz)     INTAKE/OUPUT    Current Shift: 03/18 1901 - 03/19 0700  In: 450 [I.V.:450]  Out: 900 [Urine:900]    Last three shifts: 03/17 0701 - 03/18 1900  In: 1475 [I.V.:1475]  Out: 500 [Urine:500]     LAB RESULTS     Recent Labs     03/18/21  1808 03/18/21  0129   WBC 7.2 7.1   HGB 10.0* 12.3   HCT 32.3* 39.2    237        Recent Labs     03/19/21  0400 03/18/21  1808 03/18/21  0129    138 136   K 4.4 3.9 4.2   * 280* 171*   BUN 48* 46* 34*   CREA 4.32* 5.23* 5.53*   CA 7.6* 7.9* 8.0*   INR 1.3*  --   --        RECOMMENDATIONS AND DISCHARGE PLANNING     1. Pending tests/procedures/ Plan of Care or Other Needs: ultrasound of abdomen     2. Discharge plan for patient and Needs/Barriers: TBD    3. Estimated Discharge Date: TBD Posted on Whiteboard in Patients Room: no      4. The patient's care plan was reviewed with the oncoming nurse.        \"HEALS\" SAFETY CHECK      Fall Risk    Total Score: 4    Safety Measures: Safety Measures: Bed/Chair-Wheels locked, Bed in low position, Call light within reach, Gripper socks, Side rails X 3    A safety check occurred in the patient's room between off going nurse and oncoming nurse listed above. The safety check included the below items  Area Items   H  High Alert Medications - Verify all high alert medication drips (heparin, PCA, etc.)   E  Equipment - Suction is set up for ALL patients (with rosanne)  - Red plugs utilized for all equipment (IV pumps, etc.)  - WOWs wiped down at end of shift.  - Room stocked with oxygen, suction, and other unit-specific supplies   A  Alarms - Bed alarm is set for fall risk patients  - Ensure chair alarm is in place and activated if patient is up in a chair   L  Lines - Check IV for any infiltration  - Frye bag is empty if patient has a Frye   - Tubing and IV bags are labeled   S  Safety   - Room is clean, patient is clean, and equipment is clean. - Hallways are clear from equipment besides carts. - Fall bracelet on for fall risk patients  - Ensure room is clear and free of clutter  - Suction is set up for ALL patients (with rosanne)  - Hallways are clear from equipment besides carts.    - Isolation precautions followed, supplies available outside room, sign posted     Santos Lu RN

## 2021-03-19 NOTE — PROGRESS NOTES
Problem: Falls - Risk of  Goal: *Absence of Falls  Description: Document Green Salvia Fall Risk and appropriate interventions in the flowsheet. Outcome: Progressing Towards Goal  Note: Fall Risk Interventions:  Mobility Interventions: Communicate number of staff needed for ambulation/transfer         Medication Interventions: Patient to call before getting OOB    Elimination Interventions: Call light in reach, Patient to call for help with toileting needs    History of Falls Interventions: Bed/chair exit alarm, Vital signs minimum Q4HRs X 24 hrs (comment for end date)         Problem: Patient Education: Go to Patient Education Activity  Goal: Patient/Family Education  Outcome: Progressing Towards Goal     Problem: Diabetes Self-Management  Goal: *Disease process and treatment process  Description: Define diabetes and identify own type of diabetes; list 3 options for treating diabetes. Outcome: Progressing Towards Goal  Goal: *Incorporating nutritional management into lifestyle  Description: Describe effect of type, amount and timing of food on blood glucose; list 3 methods for planning meals. Outcome: Progressing Towards Goal  Goal: *Incorporating physical activity into lifestyle  Description: State effect of exercise on blood glucose levels. Outcome: Progressing Towards Goal  Goal: *Developing strategies to promote health/change behavior  Description: Define the ABC's of diabetes; identify appropriate screenings, schedule and personal plan for screenings. Outcome: Progressing Towards Goal  Goal: *Using medications safely  Description: State effect of diabetes medications on diabetes; name diabetes medication taking, action and side effects. Outcome: Progressing Towards Goal  Goal: *Monitoring blood glucose, interpreting and using results  Description: Identify recommended blood glucose targets  and personal targets.   Outcome: Progressing Towards Goal  Goal: *Prevention, detection, treatment of acute complications  Description: List symptoms of hyper- and hypoglycemia; describe how to treat low blood sugar and actions for lowering  high blood glucose level. Outcome: Progressing Towards Goal  Goal: *Prevention, detection and treatment of chronic complications  Description: Define the natural course of diabetes and describe the relationship of blood glucose levels to long term complications of diabetes. Outcome: Progressing Towards Goal  Goal: *Developing strategies to address psychosocial issues  Description: Describe feelings about living with diabetes; identify support needed and support network  Outcome: Progressing Towards Goal  Goal: *Insulin pump training  Outcome: Progressing Towards Goal  Goal: *Sick day guidelines  Outcome: Progressing Towards Goal  Goal: *Patient Specific Goal (EDIT GOAL, INSERT TEXT)  Outcome: Progressing Towards Goal     Problem: Patient Education: Go to Patient Education Activity  Goal: Patient/Family Education  Outcome: Progressing Towards Goal     Problem: Pressure Injury - Risk of  Goal: *Prevention of pressure injury  Description: Document Vladislav Scale and appropriate interventions in the flowsheet. Outcome: Progressing Towards Goal  Note: Pressure Injury Interventions: Activity Interventions: Pressure redistribution bed/mattress(bed type)    Mobility Interventions: Pressure redistribution bed/mattress (bed type), Turn and reposition approx.  every two hours(pillow and wedges)    Nutrition Interventions: Document food/fluid/supplement intake    Friction and Shear Interventions: Apply protective barrier, creams and emollients, HOB 30 degrees or less, Lift team/patient mobility team, Minimize layers

## 2021-03-19 NOTE — PROGRESS NOTES
Reason for Admission:  Sepsis ([address] [A41.9]  CARA (acute kidney injury) ([address] [N17.9]                 RUR Score:    23%            Plan for utilizing home health:    Yes, verbal given via phone for [facility]                      Likelihood of Readmission:   Moderate                         Do you (patient/family) have any concerns for transition/discharge?  no    Transition of Care Plan:       Initial assessment completed with patient. Cognitive status of patient: oriented to time, place, person and situation. Face sheet information confirmed:  yes. The patient designates , Tanisha Judd to participate in her discharge plan and to receive any needed information. This patient lives in a single family home with spouse. Patient is able to navigate steps as needed. Prior to hospitalization, patient was considered to be independent with ADLs/IADLS : yes . Patient has a current ACP document on file: no, states she is in the process of completing one at home. Healthcare Decision Maker:   Primary Decision Maker: Wilman Saenz - Spouse - [phone]    Click here to complete [email or web address] including selection of the Healthcare Decision Maker Relationship (ie \"Primary\")    The spouse will be available to transport patient home upon discharge. The patient already has [facility], Richerd Morris, [address] chair, and raised toilet seat available in the home. Patient is not currently active with home health. Patient has not stayed in a skilled nursing facility or rehab. This patient is on dialysis :no    List of available Home Health agencies were provided and reviewed with the patient prior to discharge. Freedom of choice signed: yes, verbally for [facility]. Currently, the discharge plan is Home with [address] [place] [address] Larry Cedeno. The patient states that she can obtain her medications from the pharmacy, and take her medications as directed. Patient's current insurance is [employer].       Care Management Interventions  PCP Verified by CM:  Yes  Mode of Transport at Discharge: Self  Transition of Care Consult (CM Consult): Discharge Planning, [address]: Yes  Current Support Network: Lives with Spouse  Confirm Follow Up Transport: Family  The Patient and/or Patient Representative was Provided with a Choice of Provider and Agrees with the Discharge Plan?: Yes  Freedom of Choice List was Provided with Basic Dialogue that Supports the Patient's Individualized Plan of Care/Goals, Treatment Preferences and Shares the Quality Data Associated with the Providers?: Yes  Discharge Location  Discharge Placement: Home with home health        Geovanni Urena RN - Outcomes Manager  [phone]

## 2021-03-19 NOTE — CONSULTS
[facility]  Pulmonary, Critical Care, and Sleep Medicine    Name: Rafiq Spann MRN: [id number]   : 1950 Hospital: [address] [place] [address]   Date: 3/19/2021        Critical Care Initial Patient Consult      This patient has been seen and evaluated at the request of Dr. Suleiman Hayes for septic shock. IMPRESSION:   · Severe sepsis with septic shock: Patient has gram-positive bacteremia, source is unclear, ID following. Patient weaned off of vasopressors and is on stepdown  · CARA on CKD  · Acute encephalopathy: Resolved. LP negative  · Anemia: Secondary to chronic disease  · Diastolic CHF with moderate to severe MR  · Deconditioning/debility: Patient reports being nonambulatory for the last year  · Mild to moderate COPD: Patient is a former smoker, reports shortness of breath with exertion, although this I suspect may be more cardiac in nature  · Rash:  Unclear source -- ? Drug rash versus ? infectious  · Suspect TESSA  · Morbid obesity: Body mass index is 40.42 kg/m².    · Hyperglycemia with diabetes     Patient Active Problem List   Diagnosis Code    Cardiomyopathy, dilated ([facility] I42.0    Environmental allergies Z91.09    Diabetes mellitus type 2, insulin dependent ([facility] E11.9, Z79.4    COPD (chronic obstructive pulmonary disease) ([facility]) J44.9    Essential hypertension I10    Iron deficiency anemia D50.9    Decreased GFR R94.4    Dyslipidemia, goal LDL below 70 E78.5    Former smoker Z87.891    Osteoarthritis of both knees M17.0    Chronic pain of both knees M25.561, M25.562, G89.29    Chronic pain syndrome G89.4    Primary osteoarthritis of both knees M17.0    Chronic obstructive pulmonary disease ([facility]) J44.9    Compliance with medication regimen Z91.89    Bilateral shoulder pain M25.511, M25.512    Elevated alkaline phosphatase level R74.8    Caregiver stress Z63.6    STEMI (ST elevation myocardial infarction) ([facility]) I21.3    Acute pulmonary edema ([facility]) J81.0    Coronary artery disease involving native coronary artery of native heart without angina pectoris I25.10    Pulmonary edema J81.1    Chest pain R07.9    SOB (shortness of breath) R06.02    Coronary artery disease involving native coronary artery with unstable angina pectoris ([facility]) I25.110    Type 2 diabetes with nephropathy ([facility]) E11.21    Severe obesity (BMI 35.0-39. 9) with comorbidity ([facility] E66.01    COPD exacerbation ([facility]) J44.1    CAP (community acquired pneumonia) J18.9    Combined systolic and diastolic congestive heart failure ([facility]) I50.40    Type 2 diabetes mellitus with hyperglycemia ([facility]) E11.65    TESSA on CPAP G47.33, Z99.89    Ischemic cardiomyopathy I25.5    Syncope R55    Flash pulmonary edema ([facility]) J81.0    Elevated d-dimer R79.89    Acute on chronic respiratory failure with hypoxia ([facility]) J96.21    Pulmonary edema cardiac cause ([facility]) I50.1    COPD with acute exacerbation ([facility]) J44.1    Respiratory failure requiring intubation ([facility]) J96.90    CAD (coronary artery disease) I25.10    Chronic systolic congestive heart failure ([facility]) I50.22    Nonrheumatic mitral valve regurgitation I34.0    Sepsis ([facility]) A41.9    CARA (acute kidney injury) ([facility] N17.9    Hypotension I95.9        RECOMMENDATIONS:   Agree with ID consult, will defer antibiotics to their service. Continue ceftriaxone and vancomycin  Advise cautious use of IV fluids, patient has known CHF/cardiomyopathy and now renal dysfunction. Does not need diuresis, but is at risk for fluid overload -- will defer fluid management to Nephrology  Weaned off vasopressors, attempt to maintain MAP greater than 65 mmHg  Supplemental oxygen to maintain SpO2 >88%  Please assess for home oxygen need prior to discharge  Schedule bronchodilators: duonebs q6h, pulmicort nebs 1mg BID, and albuterol PRN  Please hold home bronchodilators.   May resume on discharge  Aggressive pulmonary toileting/bronchial hygiene  Frequent incentive spirometry  Aspiration precautions including elevating HOB >30deg  PT/OT, OOB, ambulate with assistance as tolerated  DVT ppx per primary service  Will follow     Subjective/History:     03/19/21    Patient is a 78 y.o. female with a past medical history of CAD, MI, COPD, CKD, obesity, [facility] as a transfer from [facility] ER for septic shock. Patient cannot recall details, but reports that she had been feeling lethargic and had decreased activity over the last few days.  became concerned because patient became more lethargic, patient was taken to [facility] ER where patient was found to be hypotensive, started on vasopressors and given broad-spectrum antibiotics, patient also underwent LP which was unremarkable. Patient was started on broad-spectrum antibiotics, and patient was eventually given fluids and weaned off of vasopressors yesterday evening. Patient reports she is feeling much better, reports that still has issues with dyspnea chronically, reports that she has been nonambulatory for the last year due to dyspnea. Reports that she quit smoking 2 years ago, 1 pack/day smoker prior. Currently denies any nausea, vomiting, diarrhea, chest pain, sputum, hemoptysis    Past Medical History:   Diagnosis Date    Abnormal WBC count 5/17/2016    Anemia     Bilateral shoulder pain 9/27/2016    Chondromalacia of both patellae     COPD (chronic obstructive pulmonary disease) ([address] 9/2015    mild-mod dz; Dr Nava Salvage    Diabetes [facility]) 2013    Diabetic eye exam ([address] 2016    Dilated cardiomyopathy ([address]     Dyslipidemia     Gout     Heart attack ([address]     Heart attack ([address]     Heart attack ([address] 10/18/2019    History of echocardiogram 11/14/2014    Mild LVE. EF 40%. Mild, diffuse hypk. Mild LAE.   Mild MR.      Hypercholesteremia 1/08/14    Hypertension 2000    Noncompliance with medications 2/16/2016    Obesity     Orthostatic hypotension 5/17/2016    Osteoarthritis of both knees  Pain management 04/01/2016    Dr. Jose Heard Popliteal cyst, bilateral      Vitamin D deficiency 10/16/14      Past Surgical History:   Procedure Laterality Date    HX HEART CATHETERIZATION      HX TUBAL LIGATION        Prior to Admission medications    Medication Sig Start Date End Date Taking? Authorizing Provider   atorvastatin (LIPITOR) 80 mg tablet Take 1 Tab by mouth daily. 1/25/21   Haley Aranda MD   carvediloL (COREG) 25 mg tablet Take 1 Tab by mouth two (2) times daily (with meals). 1/25/21   Haley Aranda MD   sacubitriL-valsartan Lorry Au)  mg tablet Take 1 Tab by mouth two (2) times a day. 1/25/21   Haley Aranda MD   furosemide (LASIX) 40 mg tablet Take 1 Tab by mouth two (2) times daily as needed (edema). 1/25/21   Haley Aranda MD   tiZANidine (ZANAFLEX) 4 mg tablet Take 4 mg by mouth two (2) times a day. Provider, Una   lidocaine (Lidoderm) 5 % Apply patch to the affected area for 12 hours a day and remove for 12 hours a day. 10/22/20   LORE Pagan   pantoprazole (PROTONIX) 40 mg tablet Take 1 Tab by mouth daily. 10/27/19   Antonia Quevedo DO   albuterol (PROVENTIL HFA, VENTOLIN HFA, PROAIR HFA) 90 mcg/actuation inhaler Take 2 Puffs by inhalation every four (4) hours as needed for Wheezing. 9/30/19   Vladimir De La Cruz MD   aspirin 81 mg chewable tablet Take 2 Tabs by mouth daily. 7/19/19   Kendra Rabago NP   insulin detemir (LEVEMIR) 100 unit/mL injection 15 units daily for diabetes 7/20/17   Iqra Irizarry DNP   desloratadine (CLARINEX) 5 mg tablet Take 1 Tab by mouth daily as needed for Allergies. 4/26/17   Susan Irizarry DNP   mometasone (NASONEX) 50 mcg/actuation nasal spray 2 Sprays by Both Nostrils route daily as needed. For allergies 4/26/17   Dennis Irizarry DNP   umeclidinium-vilanterol (ANORO ELLIPTA) 62.5-25 mcg/actuation inhaler Take 1 Puff by inhalation daily.  For COPD 4/26/17   Terrace Hinders, DNP Current Facility-Administered Medications   Medication Dose Route Frequency    melatonin (rapid dissolve) tablet 5 mg  5 mg Oral QHS    heparin (porcine) injection 5,000 Units  5,000 Units SubCUTAneous Q8H    0.9% sodium chloride infusion  50 mL/hr IntraVENous CONTINUOUS    insulin glargine (LANTUS) injection 10 Units  10 Units SubCUTAneous QHS    cefTRIAXone (ROCEPHIN) 2 g in sterile water (preservative free) 20 mL IV syringe  2 g IntraVENous Q24H    NOREPINephrine (LEVOPHED) 8 mg in 5% dextrose 250mL (32 mcg/mL) infusion  2-16 mcg/min IntraVENous TITRATE    VANCOMYCIN INFORMATION NOTE   Other Rx Dosing/Monitoring    insulin lispro (HUMALOG) injection   SubCUTAneous AC&HS    nystatin (MYCOSTATIN) 100,000 unit/mL oral suspension 500,000 Units  500,000 Units Oral QID    nystatin (MYCOSTATIN) 100,000 unit/gram powder   Topical BID     No Known Allergies   Social History     Tobacco Use    Smoking status: Former Smoker     Packs/day: 0.25     Years: 48.00     Pack years: 12.00     Types: Cigarettes     Quit date: 2018     Years since quittin.8    Smokeless tobacco: Never Used   Substance Use Topics    Alcohol use: No     Alcohol/week: 0.0 standard drinks      Family History   Problem Relation Age of Onset    Diabetes Mother     Hypertension Mother     Hypertension Father     Kidney Disease Maternal Aunt 50        Dialysis        Review of Systems:  A comprehensive review of systems was negative except for that written in the HPI.     Objective:   Vital Signs:    Visit Vitals  BP (!) 109/53 (BP 1 Location: Right lower arm, BP Patient Position: Lying left side)   Pulse 76   Temp 97.7 °F (36.5 °C)   Resp 20   Ht 5' 4.02\" (1.626 m) Comment: Nephrology office visit   Wt 106.9 kg (235 lb 9.6 oz)   SpO2 99%   BMI 40.42 kg/m²       O2 Device: Nasal cannula   O2 Flow Rate (L/min): 2 l/min   Temp (24hrs), Av.7 °F (36.5 °C), Min:97.3 °F (36.3 °C), Max:97.9 °F (36.6 °C)       Intake/Output:   Last shift:      No intake/output data recorded. Last 3 shifts: 03/18 0701 - 03/19 1900  In: 850 [I.V.:850]  Out: 1850 [Urine:1850]    Intake/Output Summary (Last 24 hours) at 3/19/2021 1933  Last data filed at 3/19/2021 1600  Gross per 24 hour   Intake 200 ml   Output 1350 ml   Net -1150 ml           Physical Exam:     General:  Alert, cooperative, no distress, laying in bed, morbidly obese. Head:  Normocephalic, without obvious abnormality, atraumatic. Eyes:  Conjunctivae/corneas clear. PERRL, EOMI, sclera anicteric   Nose: Nares normal. Septum midline. Mucosa normal. No drainage or sinus tenderness. Throat: Lips, mucosa, and tongue normal.  Poor dentition, no oral lesions, Mallamapti IV   Neck:  Left IJ CVL in place without drainage, supple, symmetrical, trachea midline, no adenopathy, thyroid: no enlargment/tenderness/nodules, no carotid bruit and no JVD. Back:   Symmetric, no curvature. ROM normal.   Lungs:    Poor air entry in bilateral bases, clear to auscultation bilaterally, no wheezes/rales/rhonchi throughout all lung fields   Chest wall:  No tenderness or deformity. Heart:  Regular rate and rhythm, S1, S2 normal, no murmur, click, rub or gallop. Abdomen:   Soft, non-tender. Bowel sounds normal. No masses,  No organomegaly. Extremities: Extremities normal, atraumatic, no cyanosis or clubbing, trace edema bilaterally   Pulses: 2+ and symmetric all extremities.    Skin:  Patient has pruritus with mildly erythematous rash over bilateral upper extremities with multiple excoriations, no other lesions   Lymph nodes:      Cervical, supraclavicular, and axillary nodes normal.   Neurologic: Grossly nonfocal, patient has equal strength throughout all extremities, 5/5, sensation also grossly intact         Data:     Recent Results (from the past 24 hour(s))   GLUCOSE, POC    Collection Time: 03/18/21  8:43 PM   Result Value Ref Range    Glucose (POC) 239 (H) 70 - 110 mg/dL   PTT    Collection Time: 03/19/21 4:00 AM   Result Value Ref Range    aPTT 35.8 23.0 - 36.4 SEC   PROTHROMBIN TIME + INR    Collection Time: 03/19/21  4:00 AM   Result Value Ref Range    Prothrombin time 16.0 (H) 11.5 - 15.2 sec    INR 1.3 (H) 0.8 - 1.2     FIBRINOGEN    Collection Time: 03/19/21  4:00 AM   Result Value Ref Range    Fibrinogen 325 210 - 451 mg/dL   C REACTIVE PROTEIN, QT    Collection Time: 03/19/21  4:00 AM   Result Value Ref Range    C-Reactive protein 7.3 (H) 0 - 0.3 mg/dL   LD    Collection Time: 03/19/21  4:00 AM   Result Value Ref Range     (H) 81 - 234 U/L   FERRITIN    Collection Time: 03/19/21  4:00 AM   Result Value Ref Range    Ferritin 653 (H) 8 - 388 NG/ML   TROPONIN I    Collection Time: 03/19/21  4:00 AM   Result Value Ref Range    Troponin-I, QT <0.02 0.0 - 8.170 NG/ML   METABOLIC PANEL, BASIC    Collection Time: 03/19/21  4:00 AM   Result Value Ref Range    Sodium 140 136 - 145 mmol/L    Potassium 4.4 3.5 - 5.5 mmol/L    Chloride 111 100 - 111 mmol/L    CO2 21 21 - 32 mmol/L    Anion gap 8 3.0 - 18 mmol/L    Glucose 147 (H) 74 - 99 mg/dL    BUN 48 (H) 7.0 - 18 MG/DL    Creatinine 4.32 (H) 0.6 - 1.3 MG/DL    BUN/Creatinine ratio 11 (L) 12 - 20      GFR est AA 12 (L) >60 ml/min/1.73m2    GFR est non-AA 10 (L) >60 ml/min/1.73m2    Calcium 7.6 (L) 8.5 - 10.1 MG/DL   VANCOMYCIN, RANDOM    Collection Time: 03/19/21  4:00 AM   Result Value Ref Range    Vancomycin, random 20.2 5.0 - 40.0 UG/ML   IRON PROFILE    Collection Time: 03/19/21  4:00 AM   Result Value Ref Range    Iron 150 50 - 175 ug/dL    TIBC 177 (L) 250 - 450 ug/dL    Iron % saturation 85 (H) 20 - 50 %   TSH 3RD GENERATION    Collection Time: 03/19/21  4:00 AM   Result Value Ref Range    TSH 0.21 (L) 0.36 - 3.74 uIU/mL   GLUCOSE, POC    Collection Time: 03/19/21 12:30 PM   Result Value Ref Range    Glucose (POC) 144 (H) 70 - 110 mg/dL   GLUCOSE, POC    Collection Time: 03/19/21  3:53 PM   Result Value Ref Range    Glucose (POC) 159 (H) 70 - 110 mg/dL Recent Labs     03/18/21  0230   HCO3I 16.6*   PCO2I 30.4*   PHI 7.35   PO2I 144*     Telemetry:normal sinus rhythm    Imaging:  I have personally reviewed the patients radiographs and have reviewed the reports:  Reviewed chest x-ray from 3/18/2020 shows clear lung fields bilaterally without consolidation, mass, effusion. CXR Results  (Last 48 hours)               03/18/21 0153  XR CHEST PORT Final result    Impression:      No pneumothorax after left CVL placement. Tip in the region of the proximal to   mid SVC. Narrative:  Portable Chest       CPT CODE: 77837       HISTORY: Line placement. FINDINGS:        Comparison 10/22/2020       Single frontal view of the chest. Lordotic positioning. Left internal jugular   line tip at the proximal to mid SVC. Multiple wires overlie the patient. No   pneumothorax. Heart size and mediastinal contours within normal limits. No   effusion or focal lung consolidation. Best practice :    Glycemic control  IHI ICU bundles:    MVentilated patients- VAP bundle , aim to keep peak plateau pressure 46-06CD H2O  Sress ulcer prophylaxis  DVT prophylaxis  Need for Lines, soto assessed  Palliative care consult       Central Line Bundle Followed  and Soto Bundle Followed       This care involved high complexity decision making to assess, manipulate, and support vital system functions, to treat this degree vital organ system failure and to prevent further life threatening deterioration of the patients condition.         Kendrick Cheung MD/MPH     Pulmonary, Critical Care Medicine  [facility]

## 2021-03-20 LAB
ALBUMIN SERPL-MCNC: 2.6 G/DL (ref 3.4–5)
ALBUMIN/GLOB SERPL: 1.1 {RATIO} (ref 0.8–1.7)
ALP SERPL-CCNC: 116 U/L (ref 45–117)
ALT SERPL-CCNC: 63 U/L (ref 13–56)
ANION GAP SERPL CALC-SCNC: 6 MMOL/L (ref 3–18)
AST SERPL-CCNC: 22 U/L (ref 10–38)
BACTERIA SPEC CULT: NORMAL
BASOPHILS # BLD: 0 K/UL (ref 0–0.06)
BASOPHILS NFR BLD: 0 % (ref 0–3)
BILIRUB DIRECT SERPL-MCNC: 0.2 MG/DL (ref 0–0.2)
BILIRUB SERPL-MCNC: 0.5 MG/DL (ref 0.2–1)
BUN SERPL-MCNC: 44 MG/DL (ref 7–18)
BUN/CREAT SERPL: 17 (ref 12–20)
CALCIUM SERPL-MCNC: 7.5 MG/DL (ref 8.5–10.1)
CALCIUM SERPL-MCNC: 7.5 MG/DL (ref 8.5–10.1)
CHLORIDE SERPL-SCNC: 117 MMOL/L (ref 100–111)
CO2 SERPL-SCNC: 21 MMOL/L (ref 21–32)
CREAT SERPL-MCNC: 2.63 MG/DL (ref 0.6–1.3)
DEPRECATED S PYO AG THROAT QL EIA: NEGATIVE
DIFFERENTIAL METHOD BLD: ABNORMAL
EOSINOPHIL # BLD: 2.2 K/UL (ref 0–0.4)
EOSINOPHIL NFR BLD: 24 % (ref 0–5)
ERYTHROCYTE [DISTWIDTH] IN BLOOD BY AUTOMATED COUNT: 16.3 % (ref 11.6–14.5)
GLOBULIN SER CALC-MCNC: 2.4 G/DL (ref 2–4)
GLUCOSE BLD STRIP.AUTO-MCNC: 115 MG/DL (ref 70–110)
GLUCOSE BLD STRIP.AUTO-MCNC: 115 MG/DL (ref 70–110)
GLUCOSE BLD STRIP.AUTO-MCNC: 119 MG/DL (ref 70–110)
GLUCOSE BLD STRIP.AUTO-MCNC: 126 MG/DL (ref 70–110)
GLUCOSE SERPL-MCNC: 112 MG/DL (ref 74–99)
HCT VFR BLD AUTO: 35 % (ref 35–45)
HGB BLD-MCNC: 10.8 G/DL (ref 12–16)
HSV1 DNA SPEC QL NAA+PROBE: NEGATIVE
HSV2 DNA SPEC QL NAA+PROBE: NEGATIVE
LYMPHOCYTES # BLD: 0.9 K/UL (ref 0.8–3.5)
LYMPHOCYTES NFR BLD: 10 % (ref 20–51)
MAGNESIUM SERPL-MCNC: 1.9 MG/DL (ref 1.6–2.6)
MCH RBC QN AUTO: 27.3 PG (ref 24–34)
MCHC RBC AUTO-ENTMCNC: 30.9 G/DL (ref 31–37)
MCV RBC AUTO: 88.4 FL (ref 74–97)
MONOCYTES # BLD: 0.4 K/UL (ref 0–1)
MONOCYTES NFR BLD: 4 % (ref 2–9)
NEUTS SEG # BLD: 5.8 K/UL (ref 1.8–8)
NEUTS SEG NFR BLD: 62 % (ref 42–75)
PLATELET # BLD AUTO: 271 K/UL (ref 135–420)
PLATELET COMMENTS,PCOM: ABNORMAL
PMV BLD AUTO: 9.8 FL (ref 9.2–11.8)
POTASSIUM SERPL-SCNC: 4.3 MMOL/L (ref 3.5–5.5)
PROT SERPL-MCNC: 5 G/DL (ref 6.4–8.2)
PTH-INTACT SERPL-MCNC: 639 PG/ML (ref 18.4–88)
RBC # BLD AUTO: 3.96 M/UL (ref 4.2–5.3)
RBC MORPH BLD: ABNORMAL
SERVICE CMNT-IMP: NORMAL
SODIUM SERPL-SCNC: 144 MMOL/L (ref 136–145)
SPECIMEN SOURCE: NORMAL
VANCOMYCIN SERPL-MCNC: 14.2 UG/ML (ref 5–40)
WBC # BLD AUTO: 9.3 K/UL (ref 4.6–13.2)

## 2021-03-20 PROCEDURE — 74011636637 HC RX REV CODE- 636/637: Performed by: HOSPITALIST

## 2021-03-20 PROCEDURE — 74011250637 HC RX REV CODE- 250/637: Performed by: EMERGENCY MEDICINE

## 2021-03-20 PROCEDURE — 74011250637 HC RX REV CODE- 250/637: Performed by: HOSPITALIST

## 2021-03-20 PROCEDURE — 77030038269 HC DRN EXT URIN PURWCK BARD -A

## 2021-03-20 PROCEDURE — 80202 ASSAY OF VANCOMYCIN: CPT

## 2021-03-20 PROCEDURE — 99233 SBSQ HOSP IP/OBS HIGH 50: CPT | Performed by: HOSPITALIST

## 2021-03-20 PROCEDURE — 74011000250 HC RX REV CODE- 250: Performed by: HOSPITALIST

## 2021-03-20 PROCEDURE — 2709999900 HC NON-CHARGEABLE SUPPLY

## 2021-03-20 PROCEDURE — 99233 SBSQ HOSP IP/OBS HIGH 50: CPT | Performed by: INTERNAL MEDICINE

## 2021-03-20 PROCEDURE — 65660000004 HC RM CVT STEPDOWN

## 2021-03-20 PROCEDURE — 74011000250 HC RX REV CODE- 250: Performed by: INTERNAL MEDICINE

## 2021-03-20 PROCEDURE — 83735 ASSAY OF MAGNESIUM: CPT

## 2021-03-20 PROCEDURE — 87324 CLOSTRIDIUM AG IA: CPT

## 2021-03-20 PROCEDURE — 85025 COMPLETE CBC W/AUTO DIFF WBC: CPT

## 2021-03-20 PROCEDURE — 82962 GLUCOSE BLOOD TEST: CPT

## 2021-03-20 PROCEDURE — 74011250637 HC RX REV CODE- 250/637: Performed by: INTERNAL MEDICINE

## 2021-03-20 PROCEDURE — 80048 BASIC METABOLIC PNL TOTAL CA: CPT

## 2021-03-20 PROCEDURE — 87040 BLOOD CULTURE FOR BACTERIA: CPT

## 2021-03-20 PROCEDURE — 83970 ASSAY OF PARATHORMONE: CPT

## 2021-03-20 RX ORDER — HEPARIN SODIUM 5000 [USP'U]/ML
5000 INJECTION, SOLUTION INTRAVENOUS; SUBCUTANEOUS EVERY 8 HOURS
Status: DISCONTINUED | OUTPATIENT
Start: 2021-03-20 | End: 2021-03-20 | Stop reason: SDUPTHER

## 2021-03-20 RX ORDER — ACETAMINOPHEN 650 MG/1
650 SUPPOSITORY RECTAL
Status: DISCONTINUED | OUTPATIENT
Start: 2021-03-20 | End: 2021-03-23 | Stop reason: HOSPADM

## 2021-03-20 RX ORDER — AMOXICILLIN AND CLAVULANATE POTASSIUM 500; 125 MG/1; MG/1
1 TABLET, FILM COATED ORAL 2 TIMES DAILY WITH MEALS
Status: DISCONTINUED | OUTPATIENT
Start: 2021-03-20 | End: 2021-03-22 | Stop reason: DRUGHIGH

## 2021-03-20 RX ORDER — PROMETHAZINE HYDROCHLORIDE 12.5 MG/1
12.5 TABLET ORAL
Status: DISCONTINUED | OUTPATIENT
Start: 2021-03-20 | End: 2021-03-23 | Stop reason: HOSPADM

## 2021-03-20 RX ORDER — IPRATROPIUM BROMIDE AND ALBUTEROL SULFATE 2.5; .5 MG/3ML; MG/3ML
3 SOLUTION RESPIRATORY (INHALATION)
Status: DISCONTINUED | OUTPATIENT
Start: 2021-03-20 | End: 2021-03-23 | Stop reason: HOSPADM

## 2021-03-20 RX ORDER — ONDANSETRON 2 MG/ML
4 INJECTION INTRAMUSCULAR; INTRAVENOUS
Status: DISCONTINUED | OUTPATIENT
Start: 2021-03-20 | End: 2021-03-23 | Stop reason: HOSPADM

## 2021-03-20 RX ORDER — POLYETHYLENE GLYCOL 3350 17 G/17G
17 POWDER, FOR SOLUTION ORAL DAILY PRN
Status: DISCONTINUED | OUTPATIENT
Start: 2021-03-20 | End: 2021-03-23 | Stop reason: HOSPADM

## 2021-03-20 RX ORDER — ATORVASTATIN CALCIUM 40 MG/1
80 TABLET, FILM COATED ORAL DAILY
Status: DISCONTINUED | OUTPATIENT
Start: 2021-03-21 | End: 2021-03-23 | Stop reason: HOSPADM

## 2021-03-20 RX ORDER — BUDESONIDE 0.5 MG/2ML
500 INHALANT ORAL
Status: DISCONTINUED | OUTPATIENT
Start: 2021-03-20 | End: 2021-03-23 | Stop reason: HOSPADM

## 2021-03-20 RX ORDER — DOXYCYCLINE 100 MG/1
100 CAPSULE ORAL EVERY 12 HOURS
Status: DISCONTINUED | OUTPATIENT
Start: 2021-03-20 | End: 2021-03-23 | Stop reason: HOSPADM

## 2021-03-20 RX ORDER — SODIUM CHLORIDE 0.9 % (FLUSH) 0.9 %
5-40 SYRINGE (ML) INJECTION AS NEEDED
Status: DISCONTINUED | OUTPATIENT
Start: 2021-03-20 | End: 2021-03-23 | Stop reason: HOSPADM

## 2021-03-20 RX ORDER — DOXYCYCLINE 50 MG/1
100 CAPSULE ORAL EVERY 12 HOURS
Status: DISCONTINUED | OUTPATIENT
Start: 2021-03-20 | End: 2021-03-20

## 2021-03-20 RX ORDER — GUAIFENESIN 100 MG/5ML
162 LIQUID (ML) ORAL DAILY
Status: DISCONTINUED | OUTPATIENT
Start: 2021-03-21 | End: 2021-03-23 | Stop reason: HOSPADM

## 2021-03-20 RX ORDER — AMOXICILLIN AND CLAVULANATE POTASSIUM 875; 125 MG/1; MG/1
1 TABLET, FILM COATED ORAL 2 TIMES DAILY WITH MEALS
Status: DISCONTINUED | OUTPATIENT
Start: 2021-03-20 | End: 2021-03-20 | Stop reason: DRUGHIGH

## 2021-03-20 RX ORDER — ARFORMOTEROL TARTRATE 15 UG/2ML
15 SOLUTION RESPIRATORY (INHALATION)
Status: DISCONTINUED | OUTPATIENT
Start: 2021-03-20 | End: 2021-03-23 | Stop reason: HOSPADM

## 2021-03-20 RX ORDER — IPRATROPIUM BROMIDE 0.5 MG/2.5ML
0.5 SOLUTION RESPIRATORY (INHALATION)
Status: DISCONTINUED | OUTPATIENT
Start: 2021-03-20 | End: 2021-03-21

## 2021-03-20 RX ORDER — VANCOMYCIN HYDROCHLORIDE
1250 ONCE
Status: DISPENSED | OUTPATIENT
Start: 2021-03-20 | End: 2021-03-20

## 2021-03-20 RX ORDER — CARVEDILOL 25 MG/1
25 TABLET ORAL 2 TIMES DAILY WITH MEALS
Status: DISCONTINUED | OUTPATIENT
Start: 2021-03-20 | End: 2021-03-23 | Stop reason: HOSPADM

## 2021-03-20 RX ORDER — ACETAMINOPHEN 325 MG/1
650 TABLET ORAL
Status: DISCONTINUED | OUTPATIENT
Start: 2021-03-20 | End: 2021-03-23 | Stop reason: HOSPADM

## 2021-03-20 RX ORDER — SODIUM CHLORIDE 0.9 % (FLUSH) 0.9 %
5-40 SYRINGE (ML) INJECTION EVERY 8 HOURS
Status: DISCONTINUED | OUTPATIENT
Start: 2021-03-20 | End: 2021-03-23 | Stop reason: HOSPADM

## 2021-03-20 RX ADMIN — ACETAMINOPHEN 650 MG: 325 TABLET ORAL at 19:50

## 2021-03-20 RX ADMIN — CARVEDILOL 25 MG: 25 TABLET, FILM COATED ORAL at 17:39

## 2021-03-20 RX ADMIN — NYSTATIN: 100000 POWDER TOPICAL at 17:39

## 2021-03-20 RX ADMIN — IPRATROPIUM BROMIDE AND ALBUTEROL SULFATE 3 ML: .5; 3 SOLUTION RESPIRATORY (INHALATION) at 08:27

## 2021-03-20 RX ADMIN — NYSTATIN 500000 UNITS: 500000 SUSPENSION ORAL at 17:38

## 2021-03-20 RX ADMIN — AMOXICILLIN AND CLAVULANATE POTASSIUM 1 TABLET: 500; 125 TABLET, FILM COATED ORAL at 17:38

## 2021-03-20 RX ADMIN — NYSTATIN: 100000 POWDER TOPICAL at 08:27

## 2021-03-20 RX ADMIN — INSULIN GLARGINE 10 UNITS: 100 INJECTION, SOLUTION SUBCUTANEOUS at 21:21

## 2021-03-20 RX ADMIN — NYSTATIN 500000 UNITS: 500000 SUSPENSION ORAL at 21:21

## 2021-03-20 RX ADMIN — NYSTATIN 500000 UNITS: 500000 SUSPENSION ORAL at 08:27

## 2021-03-20 RX ADMIN — DOXYCYCLINE HYCLATE 100 MG: 100 CAPSULE ORAL at 21:21

## 2021-03-20 RX ADMIN — NYSTATIN 500000 UNITS: 500000 SUSPENSION ORAL at 12:30

## 2021-03-20 RX ADMIN — IPRATROPIUM BROMIDE 0.5 MG: 0.5 SOLUTION RESPIRATORY (INHALATION) at 17:38

## 2021-03-20 RX ADMIN — Medication 5 MG: at 21:21

## 2021-03-20 RX ADMIN — IPRATROPIUM BROMIDE AND ALBUTEROL SULFATE 3 ML: .5; 3 SOLUTION RESPIRATORY (INHALATION) at 16:16

## 2021-03-20 RX ADMIN — BUDESONIDE 500 MCG: 0.5 SUSPENSION RESPIRATORY (INHALATION) at 08:27

## 2021-03-20 RX ADMIN — ACETAMINOPHEN 650 MG: 325 TABLET ORAL at 03:20

## 2021-03-20 NOTE — PROGRESS NOTES
Hospitalist Progress Note    Patient: Rubio Pablo MRN: [id number]  CSN: [id number]    YOB: 1950  Age: 78 y.o. Sex: female    DOA: 3/17/2021 LOS:  LOS: 2 days          Yesterday zosyn stopped per ID. Changed to ceftriaxone, and continued on vancomycin. Called by nsg cvc came out o/n (was placed at ). Difficult iv stick, had piv also, several attempts o/n, nsg to try again this am. Also blood cx not done. Blood cx from cvc not done as line came out. Blood cx from periphery done this afternoon. Had large soft BM this am, was incontinent of stool. Patient reports \"I don't feel good,\" not able to elaborate. Upon review of systems, she states no mouth pain, mouth is dry. She is edentulous. Denies chest pain, cough, shortness of breath. Has abdominal pain around umbilicus, unchanged from yesterday, does not want to eat lunch. Unable to state what makes it better or worse. Wants something cold to drink. Today late afternoon she had another large very soft stool, discussed w/ nsg for send for c diff. Assessment/Plan     1. gram-positive cocci in blood culture 3/17 and repeat blood cultures to determine the significance of current gram-positive bacteremia. 2. Hypotension requiring pressors at presentation, iimproving. 3. uti poa. Frye placed 3/18/21, remove this am. On ceftriaxone. 4. covid biofire negative  5. Diffuse maculopapular rash. Improving somewhat today. throat culture, group A strep screen not done yet, nsg states will send. 6. Cad s/p S/P 2 MI and s\STENT - cardiac cath 2019 revealed patent stent to proximal LAD. 7. Severe sepsis poa (fever, tachypnea) with evidence of end organ damage (CARA, acute encephalopathy)  8. Acute metabolic encephalopathy multifactorial - gabapentin dose was increased from 300-400, sepsis. 9. received her flu vaccine and her first dose of Covid vaccine, is due for her second dose of Covid 3/18/21  10.  Triple lumen placed L internal jugular by ED attending. 3/18/2021 2:09 AM. Came out 3/19 overnight  11. Diarrhea. c diff requested stat. 12. Chronic anemia since at least 2011. Hx low b12 - recheck. 13. Obesity, morbid Body mass index is 43.59 kg/m². 14. Hx acute hypoxic respiratory failure requiring intubation 2019, 2/2 pulmonary edema and infiltrates 2/2 CHF exacerbation with PNA and COPD components contributing. 15. Deconditioning/debility: Patient reports being nonambulatory for the last year  12. Mild - moderate COPD, former tobacco  17. Suspected TESSA  18. Hx gout  19. dvt prophylaxis  20. Full code. I discussed the case with Dr. Amina Leung. Time spent 40 minutes       Additional Notes:      Case discussed with:  [x]Patient  []Family  [x]Nursing  []Case Management  DVT Prophylaxis:  []Lovenox  [x]Hep SQ  []SCDs  []Coumadin   []On Heparin gtt    Vital signs/Intake and Output:  Visit Vitals  /75 (BP 1 Location: Right upper arm, BP Patient Position: At rest)   Pulse 92   Temp 98.4 °F (36.9 °C)   Resp 18   Ht 5' 4.02\" (1.626 m)   Wt 115.3 kg (254 lb 1.6 oz)   SpO2 100%   BMI 43.59 kg/m²     Current Shift:  No intake/output data recorded. Last three shifts:  03/18 1901 - 03/20 0700  In: 450 [I.V.:450]  Out: 2300 [Urine:2300]      General appearance - alert, and oriented. Found sitting up in bed drinking tea, and talking on the phone. Obese. Heent: ncat. Perrl. Dry mucous membranes. Edentulous. Neck - supple, no significant adenopathy  Chest - clear to auscultation, no wheezes, rales or rhonchi, symmetric air entry  Heart - normal rate, regular rhythm, normal S1, S2  Abdomen - soft, nontender, nondistended, no masses or organomegaly  Neurological - alert, oriented, normal speech, no focal findings or movement disorder noted  Musculoskeletal - no joint tenderness, deformity or swelling  Extremities - peripheral pulses normal, no pedal edema, no clubbing or cyanosis  Skin - maculopapular rash to b.l UE, multiple excoriations. Medications Reviewed      Labs: Results:       Chemistry Recent Labs     03/20/21  0400 03/19/21  0400 03/18/21  1808 03/18/21  0129   * 147* 280* 171*    140 138 136   K 4.3 4.4 3.9 4.2   * 111 106 104   CO2 21 21 20* 21   BUN 44* 48* 46* 34*   CREA 2.63* 4.32* 5.23* 5.53*   CA 7.5*  7.5* 7.6* 7.9* 8.0*   AGAP 6 8 12 11   BUCR 17 11* 9* 6*   AP  --   --   --  147*   TP  --   --   --  6.0*   ALB  --   --   --  2.7*   GLOB  --   --   --  3.3   AGRAT  --   --   --  0.8      CBC w/Diff Recent Labs     03/20/21  0400 03/18/21 1808 03/18/21  0129   WBC 9.3 7.2 7.1   RBC 3.96* 3.63* 4.43   HGB 10.8* 10.0* 12.3   HCT 35.0 32.3* 39.2    209 237   GRANS PENDING 81* 66   LYMPH PENDING 11* 26   EOS PENDING 2 0      Cardiac Enzymes No results for input(s): CPK, CKND1, GODFREY in the last 72 hours. No lab exists for component: CKRMB, TROIP   Coagulation Recent Labs     03/19/21  0400   PTP 16.0*   INR 1.3*   APTT 35.8       Lipid Panel Lab Results   Component Value Date/Time    Cholesterol, total 152 10/10/2019 12:00 AM    HDL Cholesterol 46 10/10/2019 12:00 AM    LDL, calculated 86 10/10/2019 12:00 AM    VLDL, calculated 20 10/10/2019 12:00 AM    Triglyceride 101 10/10/2019 12:00 AM    CHOL/HDL Ratio 5.6 (H) 05/21/2018 06:23 AM      BNP No results for input(s): BNPP in the last 72 hours.    Liver Enzymes Recent Labs     03/18/21  0129   TP 6.0*   ALB 2.7*   *      Thyroid Studies Lab Results   Component Value Date/Time    TSH 0.21 (L) 03/19/2021 04:00 AM        Procedures/imaging: see electronic medical records for all procedures/Xrays and details which were not copied into this note but were reviewed prior to creation of Plan

## 2021-03-20 NOTE — PROGRESS NOTES
RENAL DAILY PROGRESS NOTE            71-year-old female with past medical history of COPD, diabetes, hypertension, CKD stage III admitted for fever, sepsis following for renal failure  Subjective:       Complaint:   Overnight events noted  Has high-grade fever temperature 101  Hemodynamic stable  Documented urine output about 1.4 L. Positive blood culture from 18th March  Blood culture drawn on 19th remain negative      IMPRESSION:   Acute on chronic kidney injury, prerenal ATN, underlying glomerular etiology cannot be excluded with her presentation   CKD stage III, baseline creatinine around 1.3 to 1.4 mg per DL. Heavy proteinuria, likely diabetic nephropathy  Fever, sepsis, blood culture positive for gram-positive organism  Diastolic heart failure, ejection fractions about 50%   PLAN:   Her creatinine improved with IV hydrations suggestive of prerenal injury. Suspect dehydration from sepsis, high-grade fever, and also nephrotoxicity from diuretics and ARB. Continue IV hydration, monitor renal function strict I's and O documentation. Hold diuretics and ARB for now. Follow GN serology. abx per ID colleague, monitor vancomycine level. Discussed with Dr. Kathryn Espinoza.        Current Facility-Administered Medications   Medication Dose Route Frequency    albuterol-ipratropium (DUO-NEB) 2.5 MG-0.5 MG/3 ML  3 mL Nebulization Q6H RT    budesonide (PULMICORT) 500 mcg/2 ml nebulizer suspension  500 mcg Nebulization BID RT    vancomycin (VANCOCIN) 1250 mg in  ml infusion  1,250 mg IntraVENous ONCE    melatonin (rapid dissolve) tablet 5 mg  5 mg Oral QHS    heparin (porcine) injection 5,000 Units  5,000 Units SubCUTAneous Q8H    albuterol-ipratropium (DUO-NEB) 2.5 MG-0.5 MG/3 ML  3 mL Nebulization Q6H PRN    0.9% sodium chloride infusion  50 mL/hr IntraVENous CONTINUOUS    insulin glargine (LANTUS) injection 10 Units  10 Units SubCUTAneous QHS    cefTRIAXone (ROCEPHIN) 2 g in sterile water (preservative free) 20 mL IV syringe  2 g IntraVENous Q24H    sodium chloride (NS) flush 5-10 mL  5-10 mL IntraVENous PRN    acetaminophen (TYLENOL) tablet 650 mg  650 mg Oral Q6H PRN    Or    acetaminophen (TYLENOL) suppository 650 mg  650 mg Rectal Q6H PRN    VANCOMYCIN INFORMATION NOTE   Other Rx Dosing/Monitoring    insulin lispro (HUMALOG) injection   SubCUTAneous AC&HS    glucose chewable tablet 16 g  4 Tab Oral PRN    glucagon (GLUCAGEN) injection 1 mg  1 mg IntraMUSCular PRN    dextrose (D50W) injection syrg 12.5-25 g  25-50 mL IntraVENous PRN    lidocaine (XYLOCAINE) 2 % viscous solution 15 mL  15 mL Mouth/Throat PRN    diphenhydrAMINE (BENADRYL) capsule 25 mg  25 mg Oral Q6H PRN    nystatin (MYCOSTATIN) 100,000 unit/mL oral suspension 500,000 Units  500,000 Units Oral QID    nystatin (MYCOSTATIN) 100,000 unit/gram powder   Topical BID       Review of Symptoms: comprehensive ROS negative except above.    Objective:     Patient Vitals for the past 24 hrs:   Temp Pulse Resp BP SpO2   03/20/21 1206 99.9 °F (37.7 °C) 91 18 135/85 97 %   03/20/21 0755 98.4 °F (36.9 °C) 92 18 125/75 100 %   03/20/21 0400 98.2 °F (36.8 °C) 96 -- -- --   03/20/21 0313 (!) 101.2 °F (38.4 °C) 89 18 139/66 100 %   03/20/21 0000 -- 88 -- -- --   03/19/21 2324 98.9 °F (37.2 °C) 86 18 (!) 126/58 97 %   03/19/21 1936 97.9 °F (36.6 °C) 82 20 (!) 114/57 96 %   03/19/21 1558 97.7 °F (36.5 °C) 76 20 (!) 109/53 99 %        Weight change: 8.392 kg (18 lb 8 oz)     03/18 1901 - 03/20 0700  In: 450 [I.V.:450]  Out: 2300 [Urine:2300]    Intake/Output Summary (Last 24 hours) at 3/20/2021 1305  Last data filed at 3/20/2021 0400  Gross per 24 hour   Intake --   Output 950 ml   Net -950 ml     Physical Exam:   General: comfortable, no acute distress   HEENT sclera anicteric,   CVS: S1S2 heard,  no rub  RS: + air entry b/l,   Abd: Soft, Non tender, Not distended,  Neuro: non focal, awake, alert , CN II-XII are grossly intact  Extrm: +edema, no cyanosis, clubbing   Skin: no visible  Rash  Musculoskeletal: No gross joints or bone deformities         Data Review:     LABS:   Hematology:   Recent Labs     03/20/21  0400 03/18/21  1808 03/18/21  0129   WBC 9.3 7.2 7.1   HGB 10.8* 10.0* 12.3   HCT 35.0 32.3* 39.2     Chemistry:   Recent Labs     03/20/21  0400 03/19/21  0400 03/18/21  1808 03/18/21  0129   BUN 44* 48* 46* 34*   CREA 2.63* 4.32* 5.23* 5.53*   CA 7.5*  7.5* 7.6* 7.9* 8.0*   ALB  --   --   --  2.7*   K 4.3 4.4 3.9 4.2    140 138 136   * 111 106 104   CO2 21 21 20* 21   * 147* 280* 171*            Procedures/imaging: see electronic medical records for all procedures, Xrays and details which were not copied into this note but were reviewed prior to creation of Plan          Assessment & Plan:     As above       Faiza Wolf MD  3/20/2021  1:05 PM

## 2021-03-20 NOTE — PROGRESS NOTES
Problem: Falls - Risk of  Goal: *Absence of Falls  Description: Document Floyd Ronal Fall Risk and appropriate interventions in the flowsheet. Outcome: Progressing Towards Goal  Note: Fall Risk Interventions:  Mobility Interventions: Assess mobility with egress test, Bed/chair exit alarm, Patient to call before getting OOB    Medication Interventions: Bed/chair exit alarm, Patient to call before getting OOB, Teach patient to arise slowly    Elimination Interventions: Bed/chair exit alarm, Call light in reach, Patient to call for help with toileting needs, Toileting schedule/hourly rounds    History of Falls Interventions: Bed/chair exit alarm, Investigate reason for fall         Problem: Diabetes Self-Management  Goal: *Monitoring blood glucose, interpreting and using results  Description: Identify recommended blood glucose targets  and personal targets. Outcome: Progressing Towards Goal     Problem: Pressure Injury - Risk of  Goal: *Prevention of pressure injury  Description: Document Vladislav Scale and appropriate interventions in the flowsheet.   Outcome: Progressing Towards Goal  Note: Pressure Injury Interventions:     Moisture Interventions: Absorbent underpads, Moisture barrier, Minimize layers    Activity Interventions: Pressure redistribution bed/mattress(bed type)    Mobility Interventions: HOB 30 degrees or less, Pressure redistribution bed/mattress (bed type), PT/OT evaluation    Nutrition Interventions: Document food/fluid/supplement intake    Friction and Shear Interventions: Feet elevated on foot rest, HOB 30 degrees or less, Lift sheet, Minimize layers         Problem: Patient Education: Go to Patient Education Activity  Goal: Patient/Family Education  Outcome: Progressing Towards Goal

## 2021-03-20 NOTE — CONSULTS
[facility]  Pulmonary, Critical Care, and Sleep Medicine    Name: Barbara Arizmendi MRN: [id number]   : 1950 Hospital: [facility]   Date: 3/20/2021        Critical Care F/U Patient Consult      This patient has been seen and evaluated at the request of Dr. Stephanie Stone for septic shock. IMPRESSION:   · Severe sepsis with septic shock: Patient has gram-positive bacteremia, source is unclear, ID following. Patient remains off of vasopressors. Patient having some diarrhea  · CARA on CKD  · Acute encephalopathy: LP negative, likely toxic metabolic in nature  · Anemia: Secondary to chronic disease  · Diastolic CHF with moderate to severe MR  · Deconditioning/debility: Patient reports being nonambulatory for the last year  · Mild to moderate COPD: Patient is a former smoker, reports shortness of breath with exertion, although this I suspect may be more cardiac in nature  · Rash:  Unclear source -- ? Drug rash versus ? infectious  · Suspect TESSA  · Morbid obesity: Body mass index is 43.59 kg/m².    · Hyperglycemia with diabetes     Patient Active Problem List   Diagnosis Code    Cardiomyopathy, dilated ([facility] I42.0    Environmental allergies Z91.09    Diabetes mellitus type 2, insulin dependent ([facility] E11.9, Z79.4    COPD (chronic obstructive pulmonary disease) ([facility]) J44.9    Essential hypertension I10    Iron deficiency anemia D50.9    Decreased GFR R94.4    Dyslipidemia, goal LDL below 70 E78.5    Former smoker Z87.891    Osteoarthritis of both knees M17.0    Chronic pain of both knees M25.561, M25.562, G89.29    Chronic pain syndrome G89.4    Primary osteoarthritis of both knees M17.0    Chronic obstructive pulmonary disease ([facility]) J44.9    Compliance with medication regimen Z91.89    Bilateral shoulder pain M25.511, M25.512    Elevated alkaline phosphatase level R74.8    Caregiver stress Z63.6    STEMI (ST elevation myocardial infarction) ([facility]) I21.3    Acute pulmonary edema (HCC) J81.0    Coronary artery disease involving native coronary artery of native heart without angina pectoris I25.10    Pulmonary edema J81.1    Chest pain R07.9    SOB (shortness of breath) R06.02    Coronary artery disease involving native coronary artery with unstable angina pectoris (HCC) I25.110    Type 2 diabetes with nephropathy ([facility]) E11.21    Severe obesity (BMI 35.0-39. 9) with comorbidity ([facility] E66.01    COPD exacerbation ([facility]) J44.1    CAP (community acquired pneumonia) J18.9    Combined systolic and diastolic congestive heart failure (HCC) I50.40    Type 2 diabetes mellitus with hyperglycemia ([facility]) E11.65    TESSA on CPAP G47.33, Z99.89    Ischemic cardiomyopathy I25.5    Syncope R55    Flash pulmonary edema ([facility]) J81.0    Elevated d-dimer R79.89    Acute on chronic respiratory failure with hypoxia ([facility]) J96.21    Pulmonary edema cardiac cause ([facility]) I50.1    COPD with acute exacerbation ([facility]) J44.1    Respiratory failure requiring intubation ([facility]) J96.90    CAD (coronary artery disease) I25.10    Chronic systolic congestive heart failure (HCC) I50.22    Nonrheumatic mitral valve regurgitation I34.0    Sepsis ([facility]) A41.9    CARA (acute kidney injury) ([facility] N17.9    Hypotension I95.9        RECOMMENDATIONS:   Agree with ID consult, will defer antibiotics to their service. Continue ceftriaxone and vancomycin. Agree with C. difficile testing  Will defer fluid management to Nephrology  Patient pulled out central line, patient not requiring vasopressors for greater than 36 hours. Patient should not get a central line given that she cannot follow instructions and would be at high risk for dislodgment of central venous catheter again, which may result in vascular injury or air embolism.   Advise PIV access  Supplemental oxygen to maintain SpO2 >88%  Please assess for home oxygen need prior to discharge  Schedule bronchodilators: duonebs q6h, pulmicort nebs 1mg BID, and albuterol PRN  Please hold home bronchodilators.  May resume on discharge - anoro 1 puff once daily and albuterol HFA PRN  Aggressive pulmonary toileting/bronchial hygiene  Frequent incentive spirometry  Aspiration precautions including elevating HOB >30deg  PT/OT, OOB, ambulate with assistance as tolerated  DVT ppx per primary service      We will sign off at this time     Subjective/History:     03/20/21  Patient seen and examined at bedside this afternoon.  No acute events overnight.  Patient on telephone, noting that no one is helped her, and no one is treating her.  Patient reports her central line came out, when asked about this, patient reports she got up to use the bathroom and it pulled out.  Nursing reports that patient has attempted to get up multiple times today, pulled out left IJ central line which was sutured in 4 places, as well as separately got up and defecated on the floor.  Patient denies any cough, sputum, hemoptysis, chest pain, nausea, vomiting.        HPI:  Patient is a 70 y.o. female with a past medical history of CAD, MI, COPD, CKD, obesity, presented to [facility] as a transfer from [facility] for septic shock.  Patient cannot recall details, but reports that she had been feeling lethargic and had decreased activity over the last few days.   became concerned because patient became more lethargic, patient was taken to [facility] ER where patient was found to be hypotensive, started on vasopressors and given broad-spectrum antibiotics, patient also underwent LP which was unremarkable.  Patient was started on broad-spectrum antibiotics, and patient was eventually given fluids and weaned off of vasopressors yesterday evening.  Patient reports she is feeling much better, reports that still has issues with dyspnea chronically, reports that she has been nonambulatory for the last year due to dyspnea.  Reports that she quit smoking 2 years ago, 1 pack/day smoker prior.  Currently  denies any nausea, vomiting, diarrhea, chest pain, sputum, hemoptysis    Past Medical History:   Diagnosis Date    Abnormal WBC count 5/17/2016    Anemia     Bilateral shoulder pain 9/27/2016    Chondromalacia of both patellae     COPD (chronic obstructive pulmonary disease) ([facility] 9/2015    mild-mod dz; Dr Mari Lindsey    Diabetes [facility]) 2013    Diabetic eye exam ([facility] 2016    Dilated cardiomyopathy ([facility]     Dyslipidemia     Gout     Heart attack ([facility]     Heart attack ([facility]     Heart attack ([facility] 10/18/2019    History of echocardiogram 11/14/2014    Mild LVE. EF 40%. Mild, diffuse hypk. Mild LAE. Mild MR.      Hypercholesteremia 1/08/14    Hypertension 2000    Noncompliance with medications 2/16/2016    Obesity     Orthostatic hypotension 5/17/2016    Osteoarthritis of both knees     Pain management 04/01/2016    Dr. Hansen Stains Popliteal cyst, bilateral      Vitamin D deficiency 10/16/14      Past Surgical History:   Procedure Laterality Date    HX HEART CATHETERIZATION      HX TUBAL LIGATION        Prior to Admission medications    Medication Sig Start Date End Date Taking? Authorizing Provider   atorvastatin (LIPITOR) 80 mg tablet Take 1 Tab by mouth daily. 1/25/21   Shawn Castle MD   carvediloL (COREG) 25 mg tablet Take 1 Tab by mouth two (2) times daily (with meals). 1/25/21   Shawn Castle MD   sacubitriL-valsartan [place])  mg tablet Take 1 Tab by mouth two (2) times a day. 1/25/21   Shawn Castle MD   furosemide (LASIX) 40 mg tablet Take 1 Tab by mouth two (2) times daily as needed (edema). 1/25/21   Shawn Castle MD   tiZANidine (ZANAFLEX) 4 mg tablet Take 4 mg by mouth two (2) times a day. Provider, Una   lidocaine (Lidoderm) 5 % Apply patch to the affected area for 12 hours a day and remove for 12 hours a day. 10/22/20   LORE Sorto   pantoprazole (PROTONIX) 40 mg tablet Take 1 Tab by mouth daily.  10/27/19   Ana Lilia Juarez DO   albuterol (PROVENTIL HFA, VENTOLIN HFA, PROAIR HFA) 90 mcg/actuation inhaler Take 2 Puffs by inhalation every four (4) hours as needed for Wheezing. 9/30/19   Aakash West MD   aspirin 81 mg chewable tablet Take 2 Tabs by mouth daily. 7/19/19   Kendra Rabago NP   insulin detemir (LEVEMIR) 100 unit/mL injection 15 units daily for diabetes 7/20/17   Iqra Irizarry DNP   desloratadine (CLARINEX) 5 mg tablet Take 1 Tab by mouth daily as needed for Allergies. 4/26/17   Margarita Irizarry DNP   mometasone (NASONEX) 50 mcg/actuation nasal spray 2 Sprays by Both Nostrils route daily as needed. For allergies 4/26/17   Jackie Irizarry DNP   umeclidinium-vilanterol (ANORO ELLIPTA) 62.5-25 mcg/actuation inhaler Take 1 Puff by inhalation daily.  For COPD 4/26/17   Nicolas De Oliveira DNP     Current Facility-Administered Medications   Medication Dose Route Frequency    [START ON 3/21/2021] aspirin chewable tablet 162 mg  162 mg Oral DAILY    [START ON 3/21/2021] atorvastatin (LIPITOR) tablet 80 mg  80 mg Oral DAILY    carvediloL (COREG) tablet 25 mg  25 mg Oral BID WITH MEALS    sodium chloride (NS) flush 5-40 mL  5-40 mL IntraVENous Q8H    albuterol-ipratropium (DUO-NEB) 2.5 MG-0.5 MG/3 ML  3 mL Nebulization Q6H RT    budesonide (PULMICORT) 500 mcg/2 ml nebulizer suspension  500 mcg Nebulization BID RT    vancomycin (VANCOCIN) 1250 mg in  ml infusion  1,250 mg IntraVENous ONCE    amoxicillin-clavulanate (AUGMENTIN) 500-125 mg per tablet 1 Tab  1 Tab Oral BID WITH MEALS    doxycycline (VIBRAMYCIN) capsule 100 mg  100 mg Oral Q12H    ipratropium (ATROVENT) 0.02 % nebulizer solution 0.5 mg  0.5 mg Nebulization Q8H RT    arformoteroL (BROVANA) neb solution 15 mcg  15 mcg Nebulization BID RT    melatonin (rapid dissolve) tablet 5 mg  5 mg Oral QHS    heparin (porcine) injection 5,000 Units  5,000 Units SubCUTAneous Q8H    0.9% sodium chloride infusion  50 mL/hr IntraVENous CONTINUOUS    insulin glargine (LANTUS) injection 10 Units  10 Units SubCUTAneous QHS    [Held by provider] cefTRIAXone (ROCEPHIN) 2 g in sterile water (preservative free) 20 mL IV syringe  2 g IntraVENous Q24H    insulin lispro (HUMALOG) injection   SubCUTAneous AC&HS    nystatin (MYCOSTATIN) 100,000 unit/mL oral suspension 500,000 Units  500,000 Units Oral QID    nystatin (MYCOSTATIN) 100,000 unit/gram powder   Topical BID     No Known Allergies   Social History     Tobacco Use    Smoking status: Former Smoker     Packs/day: 0.25     Years: 48.00     Pack years: 12.00     Types: Cigarettes     Quit date: 2018     Years since quittin.8    Smokeless tobacco: Never Used   Substance Use Topics    Alcohol use: No     Alcohol/week: 0.0 standard drinks      Family History   Problem Relation Age of Onset    Diabetes Mother     Hypertension Mother     Hypertension Father     Kidney Disease Maternal Aunt 50        Dialysis        Review of Systems:  A comprehensive review of systems was negative except for that written in the HPI. Objective:   Vital Signs:    Visit Vitals  BP (!) 140/64 (BP 1 Location: Right upper arm, BP Patient Position: At rest)   Pulse 97   Temp 98.8 °F (37.1 °C)   Resp 18   Ht 5' 4.02\" (1.626 m) Comment: Nephrology office visit   Wt 115.3 kg (254 lb 1.6 oz)   SpO2 97%   BMI 43.59 kg/m²       O2 Device: Room air   O2 Flow Rate (L/min): 2 l/min   Temp (24hrs), Av °F (37.2 °C), Min:97.9 °F (36.6 °C), Max:101.2 °F (38.4 °C)       Intake/Output:   Last shift:       07 - 1900  In: 800 [P.O.:800]  Out: 350 [Urine:350]  Last 3 shifts: 1901 -  0700  In: 450 [I.V.:450]  Out: 2300 [Urine:2300]    Intake/Output Summary (Last 24 hours) at 3/20/2021 1719  Last data filed at 3/20/2021 1400  Gross per 24 hour   Intake 800 ml   Output 850 ml   Net -50 ml           Physical Exam:     General:  Alert, cooperative, no distress, laying in bed, morbidly obese, on the phone.    Head: Normocephalic, without obvious abnormality, atraumatic. Eyes:  Conjunctivae/corneas clear. PERRL, EOMI, sclera anicteric   Nose: Nares normal. Septum midline. Mucosa normal. No drainage or sinus tenderness. Throat: Lips, mucosa, and tongue normal.  Poor dentition, no oral lesions, Mallamapti IV   Neck: Supple, symmetrical, trachea midline, no adenopathy, thyroid: no enlargment/tenderness/nodules, no carotid bruit and no JVD. Back:   Symmetric, no curvature. ROM normal.   Lungs:    Poor air entry in bilateral bases, clear to auscultation bilaterally, no wheezes/rales/rhonchi throughout all lung fields   Chest wall:  No tenderness or deformity. Heart:  Regular rate and rhythm, S1, S2 normal, no murmur, click, rub or gallop. Abdomen:   Soft, non-tender. Bowel sounds normal. No masses,  No organomegaly. Extremities: Extremities normal, atraumatic, no cyanosis or clubbing, trace edema bilaterally   Pulses: 2+ and symmetric all extremities.    Skin:  Patient has pruritus with mildly erythematous rash over bilateral upper extremities with multiple excoriations, no other lesions   Lymph nodes:      Cervical, supraclavicular, and axillary nodes normal.   Neurologic: Grossly nonfocal, patient has equal strength throughout all extremities, 5/5, sensation also grossly intact         Data:     Recent Results (from the past 24 hour(s))   GLUCOSE, POC    Collection Time: 03/19/21  8:31 PM   Result Value Ref Range    Glucose (POC) 172 (H) 70 - 110 mg/dL   MAGNESIUM    Collection Time: 03/20/21  4:00 AM   Result Value Ref Range    Magnesium 1.9 1.6 - 2.6 mg/dL   METABOLIC PANEL, BASIC    Collection Time: 03/20/21  4:00 AM   Result Value Ref Range    Sodium 144 136 - 145 mmol/L    Potassium 4.3 3.5 - 5.5 mmol/L    Chloride 117 (H) 100 - 111 mmol/L    CO2 21 21 - 32 mmol/L    Anion gap 6 3.0 - 18 mmol/L    Glucose 112 (H) 74 - 99 mg/dL    BUN 44 (H) 7.0 - 18 MG/DL    Creatinine 2.63 (H) 0.6 - 1.3 MG/DL    BUN/Creatinine ratio 17 12 - 20      GFR est AA 22 (L) >60 ml/min/1.73m2    GFR est non-AA 18 (L) >60 ml/min/1.73m2    Calcium 7.5 (L) 8.5 - 10.1 MG/DL   CBC WITH AUTOMATED DIFF    Collection Time: 03/20/21  4:00 AM   Result Value Ref Range    WBC 9.3 4.6 - 13.2 K/uL    RBC 3.96 (L) 4.20 - 5.30 M/uL    HGB 10.8 (L) 12.0 - 16.0 g/dL    HCT 35.0 35.0 - 45.0 %    MCV 88.4 74.0 - 97.0 FL    MCH 27.3 24.0 - 34.0 PG    MCHC 30.9 (L) 31.0 - 37.0 g/dL    RDW 16.3 (H) 11.6 - 14.5 %    PLATELET 267 979 - 176 K/uL    MPV 9.8 9.2 - 11.8 FL    NEUTROPHILS 62 42 - 75 %    LYMPHOCYTES 10 (L) 20 - 51 %    MONOCYTES 4 2 - 9 %    EOSINOPHILS 24 (H) 0 - 5 %    BASOPHILS 0 0 - 3 %    ABS. NEUTROPHILS 5.8 1.8 - 8.0 K/UL    ABS. LYMPHOCYTES 0.9 0.8 - 3.5 K/UL    ABS. MONOCYTES 0.4 0 - 1.0 K/UL    ABS. EOSINOPHILS 2.2 (H) 0.0 - 0.4 K/UL    ABS.  BASOPHILS 0.0 0.0 - 0.06 K/UL    DF MANUAL      PLATELET COMMENTS ADEQUATE PLATELETS      RBC COMMENTS NORMOCYTIC, NORMOCHROMIC     PTH INTACT    Collection Time: 03/20/21  4:00 AM   Result Value Ref Range    Calcium 7.5 (L) 8.5 - 10.1 MG/DL    PTH, Intact 639.0 (H) 18.4 - 88.0 pg/mL   VANCOMYCIN, RANDOM    Collection Time: 03/20/21  4:00 AM   Result Value Ref Range    Vancomycin, random 14.2 5.0 - 40.0 UG/ML   GLUCOSE, POC    Collection Time: 03/20/21  7:31 AM   Result Value Ref Range    Glucose (POC) 115 (H) 70 - 110 mg/dL   GLUCOSE, POC    Collection Time: 03/20/21 11:43 AM   Result Value Ref Range    Glucose (POC) 126 (H) 70 - 110 mg/dL   GLUCOSE, POC    Collection Time: 03/20/21  3:38 PM   Result Value Ref Range    Glucose (POC) 115 (H) 70 - 110 mg/dL           Recent Labs     03/18/21  0230   HCO3I 16.6*   PCO2I 30.4*   PHI 7.35   PO2I 144*     Telemetry:normal sinus rhythm    Imaging:  I have personally reviewed the patients radiographs and have reviewed the reports: No new imaging in the interval   Reviewed chest x-ray from 3/18/2020 shows clear lung fields bilaterally without consolidation, mass, effusion. CXR Results  (Last 48 hours)    None         Best practice :    Glycemic control  IHI ICU bundles:    MVentilated patients- VAP bundle , aim to keep peak plateau pressure 80-48QC H2O  Sress ulcer prophylaxis  DVT prophylaxis  Need for Lines, soto assessed  Palliative care consult       Central Line Bundle Followed  and Soto Bundle Followed       This care involved high complexity decision making to assess, manipulate, and support vital system functions, to treat this degree vital organ system failure and to prevent further life threatening deterioration of the patients condition.         Crystal Osullivan MD/MPH     Pulmonary, Critical Care Medicine  [facility]

## 2021-03-20 NOTE — PROGRESS NOTES
Received call from dr. Victor M Almonte about patient's fever/lack of iv access. Interim events noted. T-max 101. Hemodynamically stable. Labs reviewed  Blood culture 3/18-  coagulase-negative Staphylococcus. throat culture Not done   Difficult to determine source of sepsis on admission, significance of coagulase negative staph bacteremia. This could represent contamination versus bloodstream infection. Will need to follow-up repeat blood cultures to determine this   patient continues to have fevers and need to continue antibiotics for sepsis till further information obtained. Unfortunately unable to give intravenous antibiotics due to lack of IV access. Multiple attempts at IV access had failed. Diarrhea- ?antibiotic associated. Rule out c.diff  Rec:  - start po augmentin, doxycycline till iv access obtained  - recommend US guided vascular access asap  - resume iv ceftriaxone, vancomycin once iv access obtained- d/c po abx once iv abx restarted  -start probiotics  - send stool c.diff  -obtain throat culture  D/w dr. Katrin Bobby.  Time spent > 21  min

## 2021-03-20 NOTE — PROGRESS NOTES
Patient refused heparin because it makes her have vaginal bleeding. SCD's were placed on patient instead for DVT prophylaxis.

## 2021-03-20 NOTE — PROGRESS NOTES
Noted that patient was off the tele box-went into room to find that patient had climbed over the bed rails and was standing at the bedside. Feces noted on bedsheet, bed rails, and on floor-soto line pulled taught and pump alarming. Patient stated she tried to get out of bed to go to the bathroom. She was assisted to the bedside commode. Bed linens changed, feces cleaned up off floor, and patient returned to bed. Once in bed, it was noted that CVC was discharged. This RN held pressure to site for three minutes, covered with sterile gauze, and secured with tegaderm. Patient has no IV access.  Noted multiple unsuccessful IV sticks from ED RN and MD.

## 2021-03-20 NOTE — PROGRESS NOTES
Bedside and Verbal shift change report given to Quan Fu RN (oncoming nurse) by Carlos Manuel Hinojosa RN (offgoing nurse). Report included the following information SBAR, Kardex, MAR and Recent Results. SITUATION:    Code Status: Full Code   Reason for Admission: Sepsis ([facility] [A41.9]   CARA (acute kidney injury) ([facility] [N17.9]    [facility] day: 2   Problem List:       Hospital Problems  Date Reviewed: 1/28/2021          Codes Class Noted POA    Hypotension ICD-10-CM: I95.9  ICD-9-CM: 458.9  3/19/2021 Yes        * (Principal) Sepsis ([facility] ICD-10-CM: A41.9  ICD-9-CM: 038.9, 995.91  3/18/2021 Unknown        CARA (acute kidney injury) ([facility] ICD-10-CM: N17.9  ICD-9-CM: 584.9  3/18/2021 Unknown        COPD with acute exacerbation ([facility] ICD-10-CM: J44.1  ICD-9-CM: 491.21  10/19/2019 Yes        CAD (coronary artery disease) ICD-10-CM: I25.10  ICD-9-CM: 414.00  10/18/2019 Yes    Overview Signed 10/24/2019 10:43 AM by Candis Ji     Added automatically from request for surgery [id number]             Ischemic cardiomyopathy ICD-10-CM: I25.5  ICD-9-CM: 414.8  9/9/2019 Yes        Type 2 diabetes with nephropathy ([facility] ICD-10-CM: E11.21  ICD-9-CM: 250.40, 583.81  7/12/2018 Yes        Severe obesity (BMI 35.0-39. 9) with comorbidity ([facility] ICD-10-CM: E66.01  ICD-9-CM: 278.01  7/12/2018 Yes        Dyslipidemia, goal LDL below 70 ICD-10-CM: E78.5  ICD-9-CM: 272.4  10/22/2015 Yes        Essential hypertension ICD-10-CM: I10  ICD-9-CM: 401.9  9/17/2015 Yes              BACKGROUND:    Past Medical History:   Past Medical History:   Diagnosis Date    Abnormal WBC count 5/17/2016    Anemia     Bilateral shoulder pain 9/27/2016    Chondromalacia of both patellae     COPD (chronic obstructive pulmonary disease) ([facility] 9/2015    mild-mod dz; Dr Gema Meza    Diabetes [facility]) 2013    Diabetic eye exam ([facility] 2016    Dilated cardiomyopathy ([facility]     Dyslipidemia     Gout     Heart attack ([facility]     Heart attack ([facility]     Heart attack ([address] 10/18/2019    History of echocardiogram 11/14/2014    Mild LVE. EF 40%. Mild, diffuse hypk. Mild LAE. Mild MR.      Hypercholesteremia 1/08/14    Hypertension 2000    Noncompliance with medications 2/16/2016    Obesity     Orthostatic hypotension 5/17/2016    Osteoarthritis of both knees     Pain management 04/01/2016    Dr. Chidi Rocha Popliteal cyst, bilateral      Vitamin D deficiency 10/16/14         Patient taking anticoagulants no (refused Heparin)     ASSESSMENT:    Changes in Assessment Throughout Shift: Patient is still without IV access; nursing supervisors and MD aware. Patient scheduled for ultrasound guided IV access.      Patient has Central Line: no Reasons if yes: N/A   Patient has Frye Cath: no Reasons if yes: N/A      Last Vitals:     Vitals:    03/20/21 0755 03/20/21 1206 03/20/21 1559 03/20/21 1920   BP: 125/75 135/85 (!) 140/64 122/75   Pulse: 92 91 97 (!) 104   Resp: 18 18 18 19   Temp: 98.4 °F (36.9 °C) 99.9 °F (37.7 °C) 98.8 °F (37.1 °C) 100.4 °F (38 °C)   SpO2: 100% 97% 97% 100%   Weight:       Height:            IV and DRAINS (will only show if present)   [REMOVED] Triple Lumen 7FR, 16CM 03/18/21 Left Internal jugular-Site Assessment: Clean, dry, & intact  [REMOVED] Peripheral IV 03/18/21 Left Hand-Site Assessment: Clean, dry, & intact     WOUND (if present)   Wound Type:  none   Dressing present Dressing Present : No   Wound Concerns/Notes:  none     PAIN    Pain Assessment    Pain Intensity 1: 0 (03/20/21 1559)              Patient Stated Pain Goal: 0  o Interventions for Pain:  none  o Intervention effective: no and N/A  o Time of last intervention: N/A   o Reassessment Completed: yes      Last 3 Weights:  Last 3 Recorded Weights in this Encounter    03/18/21 0027 03/19/21 0000 03/20/21 0313   Weight: 111.1 kg (245 lb) 106.9 kg (235 lb 9.6 oz) 115.3 kg (254 lb 1.6 oz)     Weight change: 8.392 kg (18 lb 8 oz)     INTAKE/OUPUT    Current Shift: No intake/output data recorded. Last three shifts: 03/19 0701 - 03/20 1900  In: 800 [P.O.:800]  Out: 1750 [Urine:1750]     LAB RESULTS     Recent Labs     03/20/21  0400 03/18/21  1808 03/18/21  0129   WBC 9.3 7.2 7.1   HGB 10.8* 10.0* 12.3   HCT 35.0 32.3* 39.2    209 237        Recent Labs     03/20/21  0400 03/19/21  0400 03/18/21  1808    140 138   K 4.3 4.4 3.9   * 147* 280*   BUN 44* 48* 46*   CREA 2.63* 4.32* 5.23*   CA 7.5*  7.5* 7.6* 7.9*   MG 1.9  --   --    INR  --  1.3*  --        RECOMMENDATIONS AND DISCHARGE PLANNING     1. Pending tests/procedures/ Plan of Care or Other Needs: Ultrasound guided IV access and stool sample for C Diff. 2. Discharge plan for patient and Needs/Barriers: TBD    3. Estimated Discharge Date: TBD Posted on Whiteboard in Patients Room: no      4. The patient's care plan was reviewed with the oncoming nurse. \"HEALS\" SAFETY CHECK      Fall Risk    Total Score: 3    Safety Measures: Safety Measures: Bed/Chair alarm on, Bed/Chair-Wheels locked, Bed in low position, Call light within reach, Fall prevention (comment), Gripper socks, Side rails X 3    A safety check occurred in the patient's room between off going nurse and oncoming nurse listed above. The safety check included the below items  Area Items   H  High Alert Medications - Verify all high alert medication drips (heparin, PCA, etc.)   E  Equipment - Suction is set up for ALL patients (with yanker)  - Red plugs utilized for all equipment (IV pumps, etc.)  - WOWs wiped down at end of shift.  - Room stocked with oxygen, suction, and other unit-specific supplies   A  Alarms - Bed alarm is set for fall risk patients  - Ensure chair alarm is in place and activated if patient is up in a chair   L  Lines - Check IV for any infiltration  - Frye bag is empty if patient has a Frye   - Tubing and IV bags are labeled   S  Safety   - Room is clean, patient is clean, and equipment is clean.   - [place] are clear from equipment besides carts. - Fall bracelet on for fall risk patients  - Ensure room is clear and free of clutter  - Suction is set up for ALL patients (with rosanne)  - Hallways are clear from equipment besides carts.    - Isolation precautions followed, supplies available outside room, sign posted     Maria E Tillman RN

## 2021-03-20 NOTE — PROGRESS NOTES
Bedside and Verbal shift change report given to [address] [place] [address] (oncoming nurse) by Brook Voss RN (offgoing nurse). Report included the following information SBAR, Kardex, MAR and Recent Results. SITUATION:    Code Status: Prior   Reason for Admission: [facility]epsis [facility] [A41.9]   CARA (acute kidney injury) ([facility] [N17.9]    [facility] day: 2   Problem List:       Hospital Problems  Date Reviewed: 1/28/2021          Codes Class Noted POA    Hypotension ICD-10-CM: I95.9  ICD-9-CM: 458.9  3/19/2021 Yes        * (Principal) Sepsis ([facility] ICD-10-CM: A41.9  ICD-9-CM: 038.9, 995.91  3/18/2021 Unknown        CARA (acute kidney injury) ([facility] ICD-10-CM: N17.9  ICD-9-CM: 584.9  3/18/2021 Unknown        COPD with acute exacerbation ([facility] ICD-10-CM: J44.1  ICD-9-CM: 491.21  10/19/2019 Yes        CAD (coronary artery disease) ICD-10-CM: I25.10  ICD-9-CM: 414.00  10/18/2019 Yes    Overview Signed 10/24/2019 10:43 AM by Vandana Cloud     Added automatically from request for surgery [id number]             Ischemic cardiomyopathy ICD-10-CM: I25.5  ICD-9-CM: 414.8  9/9/2019 Yes        Type 2 diabetes with nephropathy ([facility] ICD-10-CM: E11.21  ICD-9-CM: 250.40, 583.81  7/12/2018 Yes        Severe obesity (BMI 35.0-39. 9) with comorbidity ([facility] ICD-10-CM: E66.01  ICD-9-CM: 278.01  7/12/2018 Yes        Dyslipidemia, goal LDL below 70 ICD-10-CM: E78.5  ICD-9-CM: 272.4  10/22/2015 Yes        Essential hypertension ICD-10-CM: I10  ICD-9-CM: 401.9  9/17/2015 Yes              BACKGROUND:    Past Medical History:   Past Medical History:   Diagnosis Date    Abnormal WBC count 5/17/2016    Anemia     Bilateral shoulder pain 9/27/2016    Chondromalacia of both patellae     COPD (chronic obstructive pulmonary disease) ([facility] 9/2015    mild-mod dz; Dr Jesus Arizmendi    Diabetes [facility]) 2013    Diabetic eye exam ([facility] 2016    Dilated cardiomyopathy ([facility]     Dyslipidemia     Gout     Heart attack ([facility]     Heart attack ([facility]     Heart attack ([facility] 10/18/2019    History of echocardiogram 11/14/2014    Mild LVE. EF 40%. Mild, diffuse hypk. Mild LAE. Mild MR.      Hypercholesteremia 1/08/14    Hypertension 2000    Noncompliance with medications 2/16/2016    Obesity     Orthostatic hypotension 5/17/2016    Osteoarthritis of both knees     Pain management 04/01/2016    Dr. Wolfgang Parada Popliteal cyst, bilateral      Vitamin D deficiency 10/16/14         Patient taking anticoagulants no, she refuses heparin because it makes her have vaginal bleeding. SCDs were put in place instead. ASSESSMENT:    Changes in Assessment Throughout Shift: Patient receive vistaril for itching/rash-she stated it helped a little. When she was cleaned up this AM-rash was only noted to thighs.      Patient has Central Line: no Reasons if yes: n/a   Patient has Frye Cath: yes Reasons if yes: accurate I&Os      Last Vitals:     Vitals:    03/19/21 2324 03/20/21 0000 03/20/21 0313 03/20/21 0400   BP: (!) 126/58  139/66    Pulse: 86 88 89 96   Resp: 18  18    Temp: 98.9 °F (37.2 °C)  (!) 101.2 °F (38.4 °C) 98.2 °F (36.8 °C)   SpO2: 97%  100%    Weight:   115.3 kg (254 lb 1.6 oz)    Height:            IV and DRAINS (will only show if present)   [REMOVED] Triple Lumen 7FR, 16CM 03/18/21 Left Internal jugular-Site Assessment: Clean, dry, & intact  [REMOVED] Peripheral IV 03/18/21 Left Hand-Site Assessment: Clean, dry, & intact     WOUND (if present)   Wound Type:  none   Dressing present Dressing Present : No   Wound Concerns/Notes:  none     PAIN    Pain Assessment    Pain Intensity 1: 0 (03/20/21 0400)              Patient Stated Pain Goal: 0  o Interventions for Pain:  none  o Intervention effective: n/a  o Time of last intervention: n/a   o Reassessment Completed: yes      Last 3 Weights:  Last 3 Recorded Weights in this Encounter    03/18/21 0027 03/19/21 0000 03/20/21 0313   Weight: 111.1 kg (245 lb) 106.9 kg (235 lb 9.6 oz) 115.3 kg (254 lb 1.6 oz)     Weight change: 8.392 kg (18 lb 8 oz)     INTAKE/OUPUT    Current Shift: 03/19 1901 - 03/20 0700  In: -   Out: 500 [Urine:500]    Last three shifts: 03/18 0701 - 03/19 1900  In: 850 [I.V.:850]  Out: 2300 [Urine:2300]     LAB RESULTS     Recent Labs     03/20/21  0400 03/18/21  1808 03/18/21  0129   WBC 9.3 7.2 7.1   HGB 10.8* 10.0* 12.3   HCT 35.0 32.3* 39.2    209 237        Recent Labs     03/20/21  0400 03/19/21  0400 03/18/21  1808    140 138   K 4.3 4.4 3.9   * 147* 280*   BUN 44* 48* 46*   CREA 2.63* 4.32* 5.23*   CA 7.5*  7.5* 7.6* 7.9*   MG 1.9  --   --    INR  --  1.3*  --        RECOMMENDATIONS AND DISCHARGE PLANNING     1. Pending tests/procedures/ Plan of Care or Other Needs: n/a     2. Discharge plan for patient and Needs/Barriers: n/a    3. Estimated Discharge Date: TBD Posted on Whiteboard in Patients Room: no      4. The patient's care plan was reviewed with the oncoming nurse. \"HEALS\" SAFETY CHECK      Fall Risk    Total Score: 3    Safety Measures: Safety Measures: Bed/Chair-Wheels locked, Bed in low position, Call light within reach, Gripper socks, Side rails X 3    A safety check occurred in the patient's room between off going nurse and oncoming nurse listed above. The safety check included the below items  Area Items   H  High Alert Medications - Verify all high alert medication drips (heparin, PCA, etc.)   E  Equipment - Suction is set up for ALL patients (with yanker)  - Red plugs utilized for all equipment (IV pumps, etc.)  - WOWs wiped down at end of shift.  - Room stocked with oxygen, suction, and other unit-specific supplies   A  Alarms - Bed alarm is set for fall risk patients  - Ensure chair alarm is in place and activated if patient is up in a chair   L  Lines - Check IV for any infiltration  - Frye bag is empty if patient has a Frye   - Tubing and IV bags are labeled   S  Safety   - Room is clean, patient is clean, and equipment is clean.   - Hallways are clear from equipment besides carts. - Fall bracelet on for fall risk patients  - Ensure room is clear and free of clutter  - Suction is set up for ALL patients (with rosanne)  - Hallways are clear from equipment besides carts.    - Isolation precautions followed, supplies available outside room, sign posted     Sanju Mata RN

## 2021-03-21 LAB
ANION GAP SERPL CALC-SCNC: 8 MMOL/L (ref 3–18)
BACTERIA SPEC CULT: ABNORMAL
BACTERIA SPEC CULT: ABNORMAL
BASOPHILS # BLD: 0 K/UL (ref 0–0.06)
BASOPHILS NFR BLD: 0 % (ref 0–3)
BUN SERPL-MCNC: 34 MG/DL (ref 7–18)
BUN/CREAT SERPL: 21 (ref 12–20)
C DIFF GDH STL QL: NEGATIVE
C DIFF TOX A+B STL QL IA: NEGATIVE
C3 SERPL-MCNC: 176 MG/DL (ref 82–167)
C4 SERPL-MCNC: 38 MG/DL (ref 12–38)
CALCIUM SERPL-MCNC: 7.8 MG/DL (ref 8.5–10.1)
CHLORIDE SERPL-SCNC: 115 MMOL/L (ref 100–111)
CO2 SERPL-SCNC: 20 MMOL/L (ref 21–32)
CREAT SERPL-MCNC: 1.61 MG/DL (ref 0.6–1.3)
DIFFERENTIAL METHOD BLD: ABNORMAL
EBV DNA SPEC QL NAA+PROBE: NEGATIVE
EOSINOPHIL # BLD: 2.1 K/UL (ref 0–0.4)
EOSINOPHIL NFR BLD: 21 % (ref 0–5)
ERYTHROCYTE [DISTWIDTH] IN BLOOD BY AUTOMATED COUNT: 16.3 % (ref 11.6–14.5)
FOLATE SERPL-MCNC: 17.8 NG/ML (ref 3.1–17.5)
GLUCOSE BLD STRIP.AUTO-MCNC: 116 MG/DL (ref 70–110)
GLUCOSE BLD STRIP.AUTO-MCNC: 117 MG/DL (ref 70–110)
GLUCOSE BLD STRIP.AUTO-MCNC: 119 MG/DL (ref 70–110)
GLUCOSE BLD STRIP.AUTO-MCNC: 98 MG/DL (ref 70–110)
GLUCOSE SERPL-MCNC: 119 MG/DL (ref 74–99)
GRAM STN SPEC: ABNORMAL
HCT VFR BLD AUTO: 33 % (ref 35–45)
HGB BLD-MCNC: 10.5 G/DL (ref 12–16)
INTERPRETATION: NORMAL
LYMPHOCYTES # BLD: 2.3 K/UL (ref 0.8–3.5)
LYMPHOCYTES NFR BLD: 23 % (ref 20–51)
MCH RBC QN AUTO: 27.6 PG (ref 24–34)
MCHC RBC AUTO-ENTMCNC: 31.8 G/DL (ref 31–37)
MCV RBC AUTO: 86.8 FL (ref 74–97)
MONOCYTES # BLD: 1 K/UL (ref 0–1)
MONOCYTES NFR BLD: 10 % (ref 2–9)
NEUTS BAND NFR BLD MANUAL: 1 % (ref 0–5)
NEUTS SEG # BLD: 4.8 K/UL (ref 1.8–8)
NEUTS SEG NFR BLD: 45 % (ref 42–75)
NRBC BLD-RTO: 1 PER 100 WBC
PLATELET # BLD AUTO: 269 K/UL (ref 135–420)
PLATELET COMMENTS,PCOM: ABNORMAL
PMV BLD AUTO: 9.6 FL (ref 9.2–11.8)
POTASSIUM SERPL-SCNC: 4.1 MMOL/L (ref 3.5–5.5)
RBC # BLD AUTO: 3.8 M/UL (ref 4.2–5.3)
RBC MORPH BLD: ABNORMAL
SERVICE CMNT-IMP: ABNORMAL
SERVICE CMNT-IMP: ABNORMAL
SODIUM SERPL-SCNC: 143 MMOL/L (ref 136–145)
SPECIMEN SOURCE: NORMAL
VIT B12 SERPL-MCNC: 346 PG/ML (ref 211–911)
WBC # BLD AUTO: 10.2 K/UL (ref 4.6–13.2)

## 2021-03-21 PROCEDURE — 65660000004 HC RM CVT STEPDOWN

## 2021-03-21 PROCEDURE — 74011000250 HC RX REV CODE- 250: Performed by: HOSPITALIST

## 2021-03-21 PROCEDURE — 77030038269 HC DRN EXT URIN PURWCK BARD -A

## 2021-03-21 PROCEDURE — 2709999900 HC NON-CHARGEABLE SUPPLY

## 2021-03-21 PROCEDURE — 85025 COMPLETE CBC W/AUTO DIFF WBC: CPT

## 2021-03-21 PROCEDURE — 74011250637 HC RX REV CODE- 250/637: Performed by: INTERNAL MEDICINE

## 2021-03-21 PROCEDURE — 74011636637 HC RX REV CODE- 636/637: Performed by: HOSPITALIST

## 2021-03-21 PROCEDURE — 92610 EVALUATE SWALLOWING FUNCTION: CPT

## 2021-03-21 PROCEDURE — 74011250636 HC RX REV CODE- 250/636: Performed by: HOSPITALIST

## 2021-03-21 PROCEDURE — 36415 COLL VENOUS BLD VENIPUNCTURE: CPT

## 2021-03-21 PROCEDURE — 99232 SBSQ HOSP IP/OBS MODERATE 35: CPT | Performed by: HOSPITALIST

## 2021-03-21 PROCEDURE — 80048 BASIC METABOLIC PNL TOTAL CA: CPT

## 2021-03-21 PROCEDURE — 74011250637 HC RX REV CODE- 250/637: Performed by: HOSPITALIST

## 2021-03-21 PROCEDURE — 74011000250 HC RX REV CODE- 250: Performed by: INTERNAL MEDICINE

## 2021-03-21 PROCEDURE — 74011250636 HC RX REV CODE- 250/636: Performed by: INTERNAL MEDICINE

## 2021-03-21 PROCEDURE — 82607 VITAMIN B-12: CPT

## 2021-03-21 PROCEDURE — 82962 GLUCOSE BLOOD TEST: CPT

## 2021-03-21 RX ADMIN — IPRATROPIUM BROMIDE AND ALBUTEROL SULFATE 3 ML: .5; 3 SOLUTION RESPIRATORY (INHALATION) at 15:18

## 2021-03-21 RX ADMIN — Medication 5 MG: at 21:24

## 2021-03-21 RX ADMIN — AMOXICILLIN AND CLAVULANATE POTASSIUM 1 TABLET: 500; 125 TABLET, FILM COATED ORAL at 17:19

## 2021-03-21 RX ADMIN — BUDESONIDE 500 MCG: 0.5 SUSPENSION RESPIRATORY (INHALATION) at 08:43

## 2021-03-21 RX ADMIN — NYSTATIN: 100000 POWDER TOPICAL at 08:44

## 2021-03-21 RX ADMIN — Medication 1 CAPSULE: at 15:17

## 2021-03-21 RX ADMIN — ARFORMOTEROL TARTRATE 15 MCG: 15 SOLUTION RESPIRATORY (INHALATION) at 08:43

## 2021-03-21 RX ADMIN — NYSTATIN: 100000 POWDER TOPICAL at 17:20

## 2021-03-21 RX ADMIN — CARVEDILOL 25 MG: 25 TABLET, FILM COATED ORAL at 08:42

## 2021-03-21 RX ADMIN — Medication 10 ML: at 21:27

## 2021-03-21 RX ADMIN — SODIUM CHLORIDE 50 ML/HR: 900 INJECTION, SOLUTION INTRAVENOUS at 06:25

## 2021-03-21 RX ADMIN — NYSTATIN 500000 UNITS: 500000 SUSPENSION ORAL at 08:43

## 2021-03-21 RX ADMIN — CARVEDILOL 25 MG: 25 TABLET, FILM COATED ORAL at 17:19

## 2021-03-21 RX ADMIN — DOXYCYCLINE HYCLATE 100 MG: 100 CAPSULE ORAL at 08:42

## 2021-03-21 RX ADMIN — NYSTATIN 500000 UNITS: 500000 SUSPENSION ORAL at 12:17

## 2021-03-21 RX ADMIN — Medication 10 ML: at 21:26

## 2021-03-21 RX ADMIN — NYSTATIN 500000 UNITS: 500000 SUSPENSION ORAL at 17:19

## 2021-03-21 RX ADMIN — NYSTATIN 500000 UNITS: 500000 SUSPENSION ORAL at 21:24

## 2021-03-21 RX ADMIN — AMOXICILLIN AND CLAVULANATE POTASSIUM 1 TABLET: 500; 125 TABLET, FILM COATED ORAL at 08:42

## 2021-03-21 RX ADMIN — ASPIRIN 162 MG: 81 TABLET, CHEWABLE ORAL at 08:45

## 2021-03-21 RX ADMIN — ATORVASTATIN CALCIUM 80 MG: 40 TABLET, FILM COATED ORAL at 08:45

## 2021-03-21 RX ADMIN — DOXYCYCLINE HYCLATE 100 MG: 100 CAPSULE ORAL at 21:25

## 2021-03-21 RX ADMIN — IPRATROPIUM BROMIDE AND ALBUTEROL SULFATE 3 ML: .5; 3 SOLUTION RESPIRATORY (INHALATION) at 08:43

## 2021-03-21 RX ADMIN — Medication 10 ML: at 06:24

## 2021-03-21 RX ADMIN — INSULIN GLARGINE 10 UNITS: 100 INJECTION, SOLUTION SUBCUTANEOUS at 21:26

## 2021-03-21 RX ADMIN — IPRATROPIUM BROMIDE 0.5 MG: 0.5 SOLUTION RESPIRATORY (INHALATION) at 08:43

## 2021-03-21 NOTE — PROGRESS NOTES
Problem: Dysphagia (Adult)  Goal: *Acute Goals and Plan of Care (Insert Text)  Description: Patient will:  1. Tolerate regular diet with thin liquids without overt s/sx of aspiration under SLP supervision. - MET 3/21/2021  2. Utilize compensatory swallow strategies of small bite/sip, alternate liquid/solid with min cues in 4/5 trials. - MET 3/21/2021      Rec:   regular diet, thin liquids  HOB >45 during po intake, remain >30 for 30-45 minutes after po   Small bites/sips; alternate liquid/solid, slow feeding rate   Oral care TID  Meds per pt preference  Outcome: Resolved/Met     [facility]    Patient: Fariha Allen (50 y.o. female)  Date: 3/21/2021  Primary Diagnosis: Sepsis ([facility] [A41.9]  CARA (acute kidney injury) ([facility] [N17.9]        Precautions: aspiration     PLOF: Per H&P    ASSESSMENT :  Pt seen for swallow eval. Pt AOx4, accepting of eval. Pt denies difficulty swallowing, reporting regular diet prior to current admission. RN reports pt takes meds with [facility], will order food, then when it arrives does not eat. Pt reporting she does not wear dentures. Also stated \"Nothing sounds good,\" re: appetite and intake. Pt endentulous, remaining oral structures, strength, and ROM WFL; grossly intact for mastication and deglutition. Functional communication. Intelligibility >90%. Cognitive-linguistic function appears intact. Pt self-feeding PO trials of thin liquid via cup/straw and tandem drinking, puree, mixed, and regular textures. No s/sx of aspiration appreciated across consistencies. Oropharyngeal swallow appears WFL. Swallow appears timely, adequate laryngeal elevation noted via palpation, no change in vocal/resp quality appreciated. Recommend continue regular texture diet with thin liquids, meds per pt preference. Pt educated on and verbalized understanding of findings, recs, and POC; d/w RN. 0 formal ST needs for dysphagia indicated at this time. SLP will sign off at this time. Please re-consult should further concerns arise. PLAN :  Recommendations and Planned Interventions:  No formal ST needs ID'd for dysphagia. Eval only. Discharge Recommendations: To Be Determined     SUBJECTIVE:   Patient stated Just nothing sounds good. OBJECTIVE:     Past Medical History:   Diagnosis Date    Abnormal WBC count 5/17/2016    Anemia     Bilateral shoulder pain 9/27/2016    Chondromalacia of both patellae     COPD (chronic obstructive pulmonary disease) ([facility] 9/2015    mild-mod dz; Dr Thomas     Diabetes [facility]) 2013    Diabetic eye exam ([facility] 2016    Dilated cardiomyopathy ([facility]     Dyslipidemia     Gout     Heart attack ([facility]     Heart attack ([facility]     Heart attack ([facility] 10/18/2019    History of echocardiogram 11/14/2014    Mild LVE. EF 40%. Mild, diffuse hypk. Mild LAE. Mild MR.       Hypercholesteremia 1/08/14    Hypertension 2000    Noncompliance with medications 2/16/2016    Obesity     Orthostatic hypotension 5/17/2016    Osteoarthritis of both knees     Pain management 04/01/2016    Dr. Jaci Gaspar     Popliteal cyst, bilateral      Vitamin D deficiency 10/16/14     Past Surgical History:   Procedure Laterality Date    HX HEART CATHETERIZATION      HX TUBAL LIGATION       Home Situation:   Home Situation  Home Environment: Private residence  # Steps to Enter: 3  One/Two Story Residence: One story  Living Alone: No  Support Systems: Spouse/Significant Other/Partner  Patient Expects to be Discharged to[de-identified] Private residence  Current DME Used/Available at Home: None    Diet prior to admission: regular  Current Diet:  regular/thin     Cognitive and Communication Status:  Neurologic State: Alert  Orientation Level: Oriented X4  Cognition: Follows commands  Perception: Appears intact  Perseveration: No perseveration noted  Safety/Judgement: Fall prevention  Oral Assessment:  Oral Assessment  Labial: No impairment  Dentition: Edentulous  Oral Hygiene: WFL  Lingual: No impairment  Velum: No impairment  Mandible: No impairment  P.O. Trials:  Patient Position: HOB 55*  Vocal quality prior to P.O.: No impairment  Consistency Presented: Thin liquid;Puree; Solid  How Presented: Self-fed/presented;Straw;Successive swallows;Spoon     Bolus Acceptance: No impairment  Bolus Formation/Control: No impairment     Propulsion: No impairment  Oral Residue: None  Initiation of Swallow: No impairment  Laryngeal Elevation: Functional  Aspiration Signs/Symptoms: None  Pharyngeal Phase Characteristics: No impairment, issues, or problems   Effective Modifications: Alternate liquids/solids;Small sips and bites  Cues for Modifications: Minimal       Oral Phase Severity: No impairment  Pharyngeal Phase Severity : No impairment    PAIN:  Pain level pre-treatment: 0/10   Pain level post-treatment: 0/10     After evaluation:   []            Patient left in no apparent distress sitting up in chair  [x]            Patient left in no apparent distress in bed  [x]            Call bell left within reach  [x]            Nursing notified  []            Family present  []            Caregiver present  []            Bed alarm activated      COMMUNICATION/EDUCATION:   [x]            Aspiration precautions; swallow safety; compensatory techniques. [x]            Patient/family have participated as able in goal setting and plan of care. [x]            Patient/family agree to work toward stated goals and plan of care. []            Patient understands intent and goals of therapy; neutral about participation. []            Patient unable to participate in goal setting/plan of care; educ ongoing with interdisciplinary staff  []         Posted safety precautions in patient's room.     Thank you for this referral.    Nelson Dodge M.S., CCC-SLP  Speech-Language Pathologist    Time Calculation: 15 mins

## 2021-03-21 NOTE — PROGRESS NOTES
Bedside and Verbal shift change report given to Ismael Rojo RN (oncoming nurse) by Patricio Hoffman (offgoing nurse). Report included the following information SBAR, Kardex, MAR and Recent Results. SITUATION:    Code Status: Full Code  Reason for Admission: Sepsis ([facility] [A41.9]   CARA (acute kidney injury) ([facility] [N17.9]    [facility] day: 3   Problem List:       Hospital Problems  Date Reviewed: 1/28/2021          Codes Class Noted POA    Hypotension ICD-10-CM: I95.9  ICD-9-CM: 458.9  3/19/2021 Yes        * (Principal) Sepsis ([facility] ICD-10-CM: A41.9  ICD-9-CM: 038.9, 995.91  3/18/2021 Unknown        CARA (acute kidney injury) ([facility] ICD-10-CM: N17.9  ICD-9-CM: 584.9  3/18/2021 Unknown        COPD with acute exacerbation ([facility] ICD-10-CM: J44.1  ICD-9-CM: 491.21  10/19/2019 Yes        CAD (coronary artery disease) ICD-10-CM: I25.10  ICD-9-CM: 414.00  10/18/2019 Yes    Overview Signed 10/24/2019 10:43 AM by Brenda Gaviria     Added automatically from request for surgery [id number]             Ischemic cardiomyopathy ICD-10-CM: I25.5  ICD-9-CM: 414.8  9/9/2019 Yes        Type 2 diabetes with nephropathy ([facility] ICD-10-CM: E11.21  ICD-9-CM: 250.40, 583.81  7/12/2018 Yes        Severe obesity (BMI 35.0-39. 9) with comorbidity ([facility] ICD-10-CM: E66.01  ICD-9-CM: 278.01  7/12/2018 Yes        Dyslipidemia, goal LDL below 70 ICD-10-CM: E78.5  ICD-9-CM: 272.4  10/22/2015 Yes        Essential hypertension ICD-10-CM: I10  ICD-9-CM: 401.9  9/17/2015 Yes              BACKGROUND:    Past Medical History:   Past Medical History:   Diagnosis Date    Abnormal WBC count 5/17/2016    Anemia     Bilateral shoulder pain 9/27/2016    Chondromalacia of both patellae     COPD (chronic obstructive pulmonary disease) ([facility] 9/2015    mild-mod dz; Dr Suzy Mendoza    Diabetes [facility]) 2013    Diabetic eye exam ([facility] 2016    Dilated cardiomyopathy ([facility]     Dyslipidemia     Gout     Heart attack ([facility]     Heart attack ([facility]     Heart attack ([facility] 10/18/2019    History of echocardiogram 11/14/2014    Mild LVE. EF 40%. Mild, diffuse hypk. Mild LAE. Mild MR.      Hypercholesteremia 1/08/14    Hypertension 2000    Noncompliance with medications 2/16/2016    Obesity     Orthostatic hypotension 5/17/2016    Osteoarthritis of both knees     Pain management 04/01/2016    Dr. Velasquez Mclain Popliteal cyst, bilateral      Vitamin D deficiency 10/16/14         Patient taking anticoagulants no (refused Heparin)     ASSESSMENT:    Changes in Assessment Throughout Shift: Patient is still without IV access; nursing supervisors and MD aware. Patient scheduled for ultrasound guided IV access.      Patient has Central Line: no Reasons if yes: N/A   Patient has Frye Cath: no Reasons if yes: N/A      Last Vitals:     Vitals:    03/20/21 1920 03/20/21 2018 03/20/21 2300 03/21/21 0329   BP: 122/75  (!) 101/58 (!) 112/55   Pulse: (!) 104  90 86   Resp: 19 19 18   Temp: 100.4 °F (38 °C) 98.3 °F (36.8 °C) 97.1 °F (36.2 °C) 99.1 °F (37.3 °C)   SpO2: 100%  95% 99%   Weight:    109.9 kg (242 lb 4.8 oz)   Height:            IV and DRAINS (will only show if present)   [REMOVED] Triple Lumen 7FR, 16CM 03/18/21 Left Internal jugular-Site Assessment: Clean, dry, & intact  External Female Catheter 03/20/21-Site Assessment: Clean, dry, & intact  Peripheral IV 03/20/21 Left;Posterior Hand-Site Assessment: Clean, dry, & intact  [REMOVED] Peripheral IV 03/18/21 Left Hand-Site Assessment: Clean, dry, & intact     WOUND (if present)   Wound Type:  none   Dressing present Dressing Present : No   Wound Concerns/Notes:  none     PAIN    Pain Assessment    Pain Intensity 1: 0 (03/21/21 0300)              Patient Stated Pain Goal: 0  o Interventions for Pain:  none  o Intervention effective: no and N/A  o Time of last intervention: N/A   o Reassessment Completed: yes      Last 3 Weights:  Last 3 Recorded Weights in this Encounter    03/19/21 0000 03/20/21 0313 03/21/21 3430 Weight: 106.9 kg (235 lb 9.6 oz) 115.3 kg (254 lb 1.6 oz) 109.9 kg (242 lb 4.8 oz)     Weight change: -5.352 kg (-11 lb 12.8 oz)     INTAKE/OUPUT    Current Shift: No intake/output data recorded. Last three shifts: 03/19 1901 - 03/21 0700  In: 1680 [P.O.:1280; I.V.:400]  Out: 1200 [Urine:1200]     LAB RESULTS     Recent Labs     03/20/21  0400 03/18/21  1808   WBC 9.3 7.2   HGB 10.8* 10.0*   HCT 35.0 32.3*    209        Recent Labs     03/20/21  0400 03/19/21  0400 03/18/21  1808    140 138   K 4.3 4.4 3.9   * 147* 280*   BUN 44* 48* 46*   CREA 2.63* 4.32* 5.23*   CA 7.5*  7.5* 7.6* 7.9*   MG 1.9  --   --    INR  --  1.3*  --        RECOMMENDATIONS AND DISCHARGE PLANNING     1. Pending tests/procedures/ Plan of Care or Other Needs: Ultrasound guided IV access and stool sample for C Diff. 2. Discharge plan for patient and Needs/Barriers: TBD    3. Estimated Discharge Date: TBD Posted on Whiteboard in Patients Room: no      4. The patient's care plan was reviewed with the oncoming nurse. \"HEALS\" SAFETY CHECK      Fall Risk    Total Score: 3    Safety Measures: Safety Measures: Bed/Chair alarm on, Bed/Chair-Wheels locked, Call light within reach, Gripper socks, Side rails X 3, Video Monitoring for Safety    A safety check occurred in the patient's room between off going nurse and oncoming nurse listed above.     The safety check included the below items  Area Items   H  High Alert Medications - Verify all high alert medication drips (heparin, PCA, etc.)   E  Equipment - Suction is set up for ALL patients (with yanker)  - Red plugs utilized for all equipment (IV pumps, etc.)  - WOWs wiped down at end of shift.  - Room stocked with oxygen, suction, and other unit-specific supplies   A  Alarms - Bed alarm is set for fall risk patients  - Ensure chair alarm is in place and activated if patient is up in a chair   L  Lines - Check IV for any infiltration  - Frye bag is empty if patient has a Frye   - Tubing and IV bags are labeled   S  Safety   - Room is clean, patient is clean, and equipment is clean. - Hallways are clear from equipment besides carts. - Fall bracelet on for fall risk patients  - Ensure room is clear and free of clutter  - Suction is set up for ALL patients (with leeannker)  - Hallways are clear from equipment besides carts.    - Isolation precautions followed, supplies available outside room, sign posted     Roger Peña

## 2021-03-21 NOTE — PROGRESS NOTES
Problem: Falls - Risk of  Goal: *Absence of Falls  Description: Document Bryceen Freeze Fall Risk and appropriate interventions in the flowsheet. Outcome: Progressing Towards Goal  Note: Fall Risk Interventions:  Mobility Interventions: Assess mobility with egress test, Bed/chair exit alarm, Communicate number of staff needed for ambulation/transfer, Patient to call before getting OOB         Medication Interventions: Bed/chair exit alarm, Evaluate medications/consider consulting pharmacy, Patient to call before getting OOB    Elimination Interventions: Bed/chair exit alarm, Call light in reach, Patient to call for help with toileting needs, Stay With Me (per policy), Toilet paper/wipes in reach, Toileting schedule/hourly rounds    History of Falls Interventions: Bed/chair exit alarm, Evaluate medications/consider consulting pharmacy, Room close to nurse's station         Problem: Patient Education: Go to Patient Education Activity  Goal: Patient/Family Education  Outcome: Progressing Towards Goal     Problem: Diabetes Self-Management  Goal: *Disease process and treatment process  Description: Define diabetes and identify own type of diabetes; list 3 options for treating diabetes. Outcome: Progressing Towards Goal  Goal: *Incorporating nutritional management into lifestyle  Description: Describe effect of type, amount and timing of food on blood glucose; list 3 methods for planning meals. Outcome: Progressing Towards Goal  Goal: *Incorporating physical activity into lifestyle  Description: State effect of exercise on blood glucose levels. Outcome: Progressing Towards Goal  Goal: *Developing strategies to promote health/change behavior  Description: Define the ABC's of diabetes; identify appropriate screenings, schedule and personal plan for screenings.   Outcome: Progressing Towards Goal  Goal: *Using medications safely  Description: State effect of diabetes medications on diabetes; name diabetes medication taking, action and side effects. Outcome: Progressing Towards Goal  Goal: *Monitoring blood glucose, interpreting and using results  Description: Identify recommended blood glucose targets  and personal targets. Outcome: Progressing Towards Goal  Goal: *Prevention, detection, treatment of acute complications  Description: List symptoms of hyper- and hypoglycemia; describe how to treat low blood sugar and actions for lowering  high blood glucose level. Outcome: Progressing Towards Goal  Goal: *Prevention, detection and treatment of chronic complications  Description: Define the natural course of diabetes and describe the relationship of blood glucose levels to long term complications of diabetes. Outcome: Progressing Towards Goal  Goal: *Developing strategies to address psychosocial issues  Description: Describe feelings about living with diabetes; identify support needed and support network  Outcome: Progressing Towards Goal  Goal: *Patient Specific Goal (EDIT GOAL, INSERT TEXT)  Outcome: Progressing Towards Goal     Problem: Patient Education: Go to Patient Education Activity  Goal: Patient/Family Education  Outcome: Progressing Towards Goal     Problem: Pressure Injury - Risk of  Goal: *Prevention of pressure injury  Description: Document Vladislav Scale and appropriate interventions in the flowsheet.   Outcome: Progressing Towards Goal  Note: Pressure Injury Interventions:       Moisture Interventions: Absorbent underpads, Apply protective barrier, creams and emollients, Assess need for specialty bed, Check for incontinence Q2 hours and as needed, Internal/External urinary devices, Limit adult briefs, Maintain skin hydration (lotion/cream), Minimize layers, Moisture barrier, Offer toileting Q_hr    Activity Interventions: Assess need for specialty bed, Increase time out of bed, Pressure redistribution bed/mattress(bed type)    Mobility Interventions: Assess need for specialty bed, HOB 30 degrees or less, Pressure redistribution bed/mattress (bed type), Turn and reposition approx. every two hours(pillow and wedges)    Nutrition Interventions: Document food/fluid/supplement intake, Offer support with meals,snacks and hydration    Friction and Shear Interventions: Apply protective barrier, creams and emollients, Foam dressings/transparent film/skin sealants, HOB 30 degrees or less, Lift sheet, Lift team/patient mobility team, Minimize layers                Problem: Patient Education: Go to Patient Education Activity  Goal: Patient/Family Education  Outcome: Progressing Towards Goal     Problem: Sepsis: Day 4  Goal: Activity/Safety  Outcome: Progressing Towards Goal  Goal: Consults, if ordered  Outcome: Progressing Towards Goal  Goal: Diagnostic Test/Procedures  Outcome: Progressing Towards Goal  Goal: Nutrition/Diet  Outcome: Progressing Towards Goal  Goal: Discharge Planning  Outcome: Progressing Towards Goal  Goal: Medications  Outcome: Progressing Towards Goal  Goal: Respiratory  Outcome: Progressing Towards Goal  Goal: Treatments/Interventions/Procedures  Outcome: Progressing Towards Goal  Goal: Psychosocial  Outcome: Progressing Towards Goal  Goal: *Oxygen saturation within defined limits  Outcome: Progressing Towards Goal  Goal: *Hemodynamically stable  Outcome: Progressing Towards Goal  Goal: *Vital signs within defined limits  Outcome: Progressing Towards Goal  Goal: *Tolerating diet  Outcome: Progressing Towards Goal  Goal: *Demonstrates progressive activity  Outcome: Progressing Towards Goal  Goal: *Fluid volume maintenance  Outcome: Progressing Towards Goal     Problem: Sepsis: Discharge Outcomes  Goal: *Glycemic control  Outcome: Progressing Towards Goal     Problem: Risk for Spread of Infection  Goal: Prevent transmission of infectious organism to others  Description: Prevent the transmission of infectious organisms to other patients, staff members, and visitors.   Outcome: Progressing Towards Goal     Problem: Patient Education:  Go to Education Activity  Goal: Patient/Family Education  Outcome: Progressing Towards Goal

## 2021-03-21 NOTE — ROUTINE PROCESS
2320 Collected stool specimen per MD order for cdiff screening.    0200 Specimen with  who agrees to transport to the lab.

## 2021-03-21 NOTE — PROGRESS NOTES
RENAL DAILY PROGRESS NOTE            70-year-old female with past medical history of COPD, diabetes, hypertension, CKD stage III admitted for fever, sepsis following for renal failure  Subjective:       Complaint:   Overnight events noted  Her blood culture drawn yesterday remain negative  Hypotension improving  Had low-grade fever yesterday  Has been receiving IV hydration without difficulty in oxygenation  Documented urine output about 700 cc      IMPRESSION:   Acute on chronic kidney injury, prerenal ATN, underlying glomerular etiology cannot be excluded with her presentation   CKD stage III, baseline creatinine around 1.3 to 1.4 mg per DL. Heavy proteinuria, likely diabetic nephropathy  Fever, sepsis, blood culture positive for gram-positive organism  Diastolic heart failure, ejection fractions about 50%  Secondary hyperparathyroidism of CKD   PLAN:   Her lab from this morning still pending. Continue current IV hydration. Hold diuretics and ARB for now. Follow GN serology. abx per ID colleague, monitor vancomycine level. Discussed with Dr. Barry Baldwin.        Current Facility-Administered Medications   Medication Dose Route Frequency    L. acidophilus,casei,rhamnosus (BIO-K PLUS) capsule 1 Cap  1 Cap Oral DAILY    aspirin chewable tablet 162 mg  162 mg Oral DAILY    atorvastatin (LIPITOR) tablet 80 mg  80 mg Oral DAILY    carvediloL (COREG) tablet 25 mg  25 mg Oral BID WITH MEALS    sodium chloride (NS) flush 5-40 mL  5-40 mL IntraVENous Q8H    sodium chloride (NS) flush 5-40 mL  5-40 mL IntraVENous PRN    acetaminophen (TYLENOL) tablet 650 mg  650 mg Oral Q6H PRN    Or    acetaminophen (TYLENOL) suppository 650 mg  650 mg Rectal Q6H PRN    polyethylene glycol (MIRALAX) packet 17 g  17 g Oral DAILY PRN    promethazine (PHENERGAN) tablet 12.5 mg  12.5 mg Oral Q6H PRN    Or    ondansetron (ZOFRAN) injection 4 mg  4 mg IntraVENous Q6H PRN    albuterol-ipratropium (DUO-NEB) 2.5 MG-0.5 MG/3 ML  3 mL Nebulization Q6H RT    budesonide (PULMICORT) 500 mcg/2 ml nebulizer suspension  500 mcg Nebulization BID RT    amoxicillin-clavulanate (AUGMENTIN) 500-125 mg per tablet 1 Tab  1 Tab Oral BID WITH MEALS    doxycycline (VIBRAMYCIN) capsule 100 mg  100 mg Oral Q12H    ipratropium (ATROVENT) 0.02 % nebulizer solution 0.5 mg  0.5 mg Nebulization Q8H RT    arformoteroL (BROVANA) neb solution 15 mcg  15 mcg Nebulization BID RT    melatonin (rapid dissolve) tablet 5 mg  5 mg Oral QHS    heparin (porcine) injection 5,000 Units  5,000 Units SubCUTAneous Q8H    albuterol-ipratropium (DUO-NEB) 2.5 MG-0.5 MG/3 ML  3 mL Nebulization Q6H PRN    0.9% sodium chloride infusion  50 mL/hr IntraVENous CONTINUOUS    insulin glargine (LANTUS) injection 10 Units  10 Units SubCUTAneous QHS    [Held by provider] cefTRIAXone (ROCEPHIN) 2 g in sterile water (preservative free) 20 mL IV syringe  2 g IntraVENous Q24H    sodium chloride (NS) flush 5-10 mL  5-10 mL IntraVENous PRN    acetaminophen (TYLENOL) tablet 650 mg  650 mg Oral Q6H PRN    Or    acetaminophen (TYLENOL) suppository 650 mg  650 mg Rectal Q6H PRN    insulin lispro (HUMALOG) injection   SubCUTAneous AC&HS    glucose chewable tablet 16 g  4 Tab Oral PRN    glucagon (GLUCAGEN) injection 1 mg  1 mg IntraMUSCular PRN    dextrose (D50W) injection syrg 12.5-25 g  25-50 mL IntraVENous PRN    lidocaine (XYLOCAINE) 2 % viscous solution 15 mL  15 mL Mouth/Throat PRN    diphenhydrAMINE (BENADRYL) capsule 25 mg  25 mg Oral Q6H PRN    nystatin (MYCOSTATIN) 100,000 unit/mL oral suspension 500,000 Units  500,000 Units Oral QID    nystatin (MYCOSTATIN) 100,000 unit/gram powder   Topical BID       Review of Symptoms: comprehensive ROS negative except above.    Objective:     Patient Vitals for the past 24 hrs:   Temp Pulse Resp BP SpO2   03/21/21 1157 98.6 °F (37 °C) 87 16 111/70 95 %   03/21/21 0752 98.6 °F (37 °C) 95 18 134/78 98 %   03/21/21 0329 99.1 °F (37.3 °C) 86 18 (!) 112/55 99 %   03/20/21 2300 97.1 °F (36.2 °C) 90 19 (!) 101/58 95 %   03/20/21 2018 98.3 °F (36.8 °C) -- -- -- --   03/20/21 1920 100.4 °F (38 °C) (!) 104 19 122/75 100 %   03/20/21 1559 98.8 °F (37.1 °C) 97 18 (!) 140/64 97 %        Weight change: -5.352 kg (-11 lb 12.8 oz)     03/19 1901 - 03/21 0700  In: 1680 [P.O.:1280;  I.V.:400]  Out: 1200 [Urine:1200]    Intake/Output Summary (Last 24 hours) at 3/21/2021 1352  Last data filed at 3/21/2021 1157  Gross per 24 hour   Intake 2127.5 ml   Output 350 ml   Net 1777.5 ml     Physical Exam:   General: comfortable, no acute distress   HEENT sclera anicteric,   CVS: S1S2 heard,  no rub  RS: + air entry b/l,   Abd: Soft, Non tender, Not distended,  Neuro: non focal, awake, alert , CN II-XII are grossly intact  Extrm: +edema, no cyanosis, clubbing   Skin: no visible  Rash  Musculoskeletal: No gross joints or bone deformities         Data Review:     LABS:   Hematology:   Recent Labs     03/20/21  0400 03/18/21  1808   WBC 9.3 7.2   HGB 10.8* 10.0*   HCT 35.0 32.3*     Chemistry:   Recent Labs     03/20/21 2121 03/20/21  0400 03/19/21  0400 03/18/21  1808   BUN  --  44* 48* 46*   CREA  --  2.63* 4.32* 5.23*   CA  --  7.5*  7.5* 7.6* 7.9*   ALB 2.6*  --   --   --    K  --  4.3 4.4 3.9   NA  --  144 140 138   CL  --  117* 111 106   CO2  --  21 21 20*   GLU  --  112* 147* 280*            Procedures/imaging: see electronic medical records for all procedures, Xrays and details which were not copied into this note but were reviewed prior to creation of Plan          Assessment & Plan:     As above       Niki Davila MD  3/21/2021  1:05 PM

## 2021-03-21 NOTE — PROGRESS NOTES
Bedside and Verbal shift change report given to Baldemar Rowell RN (oncoming nurse) by Mariam Hernandez RN (offgoing nurse). Report included the following information SBAR, Kardex, MAR and Recent Results. SITUATION:    Code Status: Full Code  Reason for Admission: Sepsis ([facility] [A41.9]   CARA (acute kidney injury) ([facility] [N17.9]    [facility] day: 3   Problem List:       Hospital Problems  Date Reviewed: 1/28/2021          Codes Class Noted POA    Hypotension ICD-10-CM: I95.9  ICD-9-CM: 458.9  3/19/2021 Yes        * (Principal) Sepsis ([facility] ICD-10-CM: A41.9  ICD-9-CM: 038.9, 995.91  3/18/2021 Unknown        CARA (acute kidney injury) ([facility] ICD-10-CM: N17.9  ICD-9-CM: 584.9  3/18/2021 Unknown        COPD with acute exacerbation ([facility] ICD-10-CM: J44.1  ICD-9-CM: 491.21  10/19/2019 Yes        CAD (coronary artery disease) ICD-10-CM: I25.10  ICD-9-CM: 414.00  10/18/2019 Yes    Overview Signed 10/24/2019 10:43 AM by Brenda Gaviria     Added automatically from request for surgery [id number]             Ischemic cardiomyopathy ICD-10-CM: I25.5  ICD-9-CM: 414.8  9/9/2019 Yes        Type 2 diabetes with nephropathy ([facility] ICD-10-CM: E11.21  ICD-9-CM: 250.40, 583.81  7/12/2018 Yes        Severe obesity (BMI 35.0-39. 9) with comorbidity ([facility] ICD-10-CM: E66.01  ICD-9-CM: 278.01  7/12/2018 Yes        Dyslipidemia, goal LDL below 70 ICD-10-CM: E78.5  ICD-9-CM: 272.4  10/22/2015 Yes        Essential hypertension ICD-10-CM: I10  ICD-9-CM: 401.9  9/17/2015 Yes              BACKGROUND:    Past Medical History:   Past Medical History:   Diagnosis Date    Abnormal WBC count 5/17/2016    Anemia     Bilateral shoulder pain 9/27/2016    Chondromalacia of both patellae     COPD (chronic obstructive pulmonary disease) ([facility] 9/2015    mild-mod dz; Dr Suzy Mendoza    Diabetes [facility]) 2013    Diabetic eye exam ([facility] 2016    Dilated cardiomyopathy ([facility]     Dyslipidemia     Gout     Heart attack ([facility]     Heart attack ([facility]     Heart attack ([address] 10/18/2019    History of echocardiogram 11/14/2014    Mild LVE. EF 40%. Mild, diffuse hypk. Mild LAE.   Mild MR.      Hypercholesteremia 1/08/14    Hypertension 2000    Noncompliance with medications 2/16/2016    Obesity     Orthostatic hypotension 5/17/2016    Osteoarthritis of both knees     Pain management 04/01/2016    Dr. Donavan Long Popliteal cyst, bilateral      Vitamin D deficiency 10/16/14         Patient taking anticoagulants no (refused Heparin)     ASSESSMENT:    Changes in Assessment Throughout Shift: None     Patient has Central Line: no Reasons if yes: N/A   Patient has Frye Cath: no Reasons if yes: N/A      Last Vitals:     Vitals:    03/21/21 0329 03/21/21 0752 03/21/21 1157 03/21/21 1552   BP: (!) 112/55 134/78 111/70 137/83   Pulse: 86 95 87 84   Resp: 18 18 16 18   Temp: 99.1 °F (37.3 °C) 98.6 °F (37 °C) 98.6 °F (37 °C) 97.7 °F (36.5 °C)   SpO2: 99% 98% 95% 97%   Weight: 109.9 kg (242 lb 4.8 oz)      Height:            IV and DRAINS (will only show if present)   [REMOVED] Triple Lumen 7FR, 16CM 03/18/21 Left Internal jugular-Site Assessment: Clean, dry, & intact  [REMOVED] External Female Catheter 03/20/21-Site Assessment: Clean, dry, & intact  Peripheral IV 03/20/21 Left;Posterior Hand-Site Assessment: Clean, dry, & intact  [REMOVED] Peripheral IV 03/18/21 Left Hand-Site Assessment: Clean, dry, & intact     WOUND (if present)   Wound Type:  none   Dressing present Dressing Present : No   Wound Concerns/Notes:  none     PAIN    Pain Assessment    Pain Intensity 1: 0 (03/21/21 1552)              Patient Stated Pain Goal: 0  o Interventions for Pain:  none  o Intervention effective: no and N/A  o Time of last intervention: N/A   o Reassessment Completed: yes      Last 3 Weights:  Last 3 Recorded Weights in this Encounter    03/19/21 0000 03/20/21 0313 03/21/21 0329   Weight: 106.9 kg (235 lb 9.6 oz) 115.3 kg (254 lb 1.6 oz) 109.9 kg (242 lb 4.8 oz)     Weight change: -5.352 kg (-11 lb 12.8 oz)     INTAKE/OUPUT    Current Shift: No intake/output data recorded. Last three shifts: 03/20 0701 - 03/21 1900  In: 2977.5 [P.O.:2330; I.V.:647.5]  Out: 1250 [Urine:1250]     LAB RESULTS     Recent Labs     03/21/21  1610 03/20/21  0400   WBC 10.2 9.3   HGB 10.5* 10.8*   HCT 33.0* 35.0    271        Recent Labs     03/21/21  1610 03/20/21  0400 03/19/21  0400    144 140   K 4.1 4.3 4.4   * 112* 147*   BUN 34* 44* 48*   CREA 1.61* 2.63* 4.32*   CA 7.8* 7.5*  7.5* 7.6*   MG  --  1.9  --    INR  --   --  1.3*       RECOMMENDATIONS AND DISCHARGE PLANNING     1. Pending tests/procedures/ Plan of Care or Other Needs: No  2. Discharge plan for patient and Needs/Barriers: TBD    3. Estimated Discharge Date: TBD Posted on Whiteboard in Patients Room: no      4. The patient's care plan was reviewed with the oncoming nurse. \"HEALS\" SAFETY CHECK      Fall Risk    Total Score: 3    Safety Measures: Safety Measures: Bed/Chair alarm on, Bed/Chair-Wheels locked, Bed in low position, Call light within reach, Fall prevention (comment), Gripper socks, Side rails X 3, Video Monitoring for Safety    A safety check occurred in the patient's room between off going nurse and oncoming nurse listed above.     The safety check included the below items  Area Items   H  High Alert Medications - Verify all high alert medication drips (heparin, PCA, etc.)   E  Equipment - Suction is set up for ALL patients (with yanker)  - Red plugs utilized for all equipment (IV pumps, etc.)  - WOWs wiped down at end of shift.  - Room stocked with oxygen, suction, and other unit-specific supplies   A  Alarms - Bed alarm is set for fall risk patients  - Ensure chair alarm is in place and activated if patient is up in a chair   L  Lines - Check IV for any infiltration  - Frye bag is empty if patient has a Frye   - Tubing and IV bags are labeled   S  Safety   - Room is clean, patient is clean, and equipment is clean. - Hallways are clear from equipment besides carts. - Fall bracelet on for fall risk patients  - Ensure room is clear and free of clutter  - Suction is set up for ALL patients (with leeannker)  - Hallways are clear from equipment besides carts.    - Isolation precautions followed, supplies available outside room, sign posted     Flor Powell RN

## 2021-03-21 NOTE — PROGRESS NOTES
Hospitalist Progress Note    Patient: Lisa Flaherty MRN: [id number]  CSN: [id number]    YOB: 1950  Age: 78 y.o. Sex: female    DOA: 3/17/2021 LOS:  LOS: 3 days          tcurrent 98. 6. tmax 99. 1.    c diff negative. Throat cx pending. GAS Ag negative. Blood cx s hominis, s epidermidis. Repeat blood cx (3/20) ngtd. 2o gauge piv placed to left hand. Labs not drawn, phlebotomy aware. Patient states she feels better. Abdominal pain improving. For lunch she ate mashed potatoes, and vegetables. Did not eat the chicken. She states she's trying not to scratch her rash, and its improving. She's not sure if rash on her face is getting better or worse. Requests I update her . Assessment/Plan     1. Blood cx (3/17/21) s hominis, s epidermidis. Repeat blood cx (3/20) ngtd. 2. Hypotension requiring pressors at presentation, iimproving. 3. possible uti, cx negative. Frye placed 3/18/21, she pulled it out 3/2021 am.  4. covid biofire negative  5. Diffuse maculopapular rash. Improving slowly. throat culture pending. group A strep Ag negative. ID follows. 6. Cad s/p S/P 2 MI and s\STENT - cardiac cath 2019 revealed patent stent to proximal LAD. 7. Severe sepsis poa (fever, tachypnea) with evidence of end organ damage (CARA, acute encephalopathy)  8. Acute metabolic encephalopathy multifactorial improving - gabapentin dose was increased from 300-400 pta, sepsis. 9. received her flu vaccine and her first dose of Covid vaccine, is due for her second dose of Covid 3/18/21  10. Triple lumen placed L internal jugular by ED attending 3/18/2021 2:09 AM. Came out 3/19 overnight  11. Diarrhea. c diff negative. Bio k. 12. Chronic anemia since at least 2011. Hx low b12 - recheck. 13. Obesity, morbid Body mass index is 41.57 kg/m².   14. Hx acute hypoxic respiratory failure requiring intubation 2019, 2/2 pulmonary edema and infiltrates 2/2 CHF exacerbation with PNA and COPD components contributing. 15. Deconditioning/debility: Patient reports being nonambulatory for the last year  12. Mild - moderate COPD, former tobacco  17. Suspected TESSA  18. Hx gout  19. dvt prophylaxis  20. Full code. Pt / ot. Jodi Singh [phone] updated over phone, extensive discussion held, all questions answered to the best of my ability. Time spent 25 minutes       Additional Notes:      Case discussed with:  [x]Patient  []Family  [x]Nursing  []Case Management  DVT Prophylaxis:  []Lovenox  [x]Hep SQ  []SCDs  []Coumadin   []On Heparin gtt    Vital signs/Intake and Output:  Visit Vitals  /70 (BP 1 Location: Right upper arm, BP Patient Position: At rest)   Pulse 87   Temp 98.6 °F (37 °C)   Resp 16   Ht 5' 4.02\" (1.626 m)   Wt 109.9 kg (242 lb 4.8 oz)   SpO2 95%   BMI 41.57 kg/m²     Current Shift:  No intake/output data recorded. Last three shifts:  03/19 1901 - 03/21 0700  In: 1680 [P.O.:1280; I.V.:400]  Out: 1200 [Urine:1200]      General appearance - alert, awake and oriented x 4 today, appropriate and engaged. Good eye contact. Answering questions, following commands. Heent: ncat. Perrl. Dry mucous membranes. Edentulous. Neck - supple, no significant adenopathy  Chest - clear to auscultation, no wheezes, rales or rhonchi, symmetric air entry  Heart - normal rate, regular rhythm, normal S1, S2  Abdomen - soft, nontender, nondistended, no masses or organomegaly  Neurological - alert, oriented, normal speech, no focal findings or movement disorder noted  Musculoskeletal - no joint tenderness, deformity or swelling  Extremities - peripheral pulses normal, no pedal edema, no clubbing or cyanosis  Skin - excoriations to  B.l UE.        Medications Reviewed      Labs: Results:       Chemistry Recent Labs     03/20/21 2121 03/20/21  0400 03/19/21  0400 03/18/21  1808   GLU  --  112* 147* 280*   NA  --  144 140 138   K  --  4.3 4.4 3.9   CL  --  117* 111 106   CO2  --  21 21 20*   BUN  --  44* 48* 46* CREA  --  2.63* 4.32* 5.23*   CA  --  7.5*  7.5* 7.6* 7.9*   AGAP  --  6 8 12   BUCR  --  17 11* 9*     --   --   --    TP 5.0*  --   --   --    ALB 2.6*  --   --   --    GLOB 2.4  --   --   --    AGRAT 1.1  --   --   --       CBC w/Diff Recent Labs     03/20/21  0400 03/18/21  1808   WBC 9.3 7.2   RBC 3.96* 3.63*   HGB 10.8* 10.0*   HCT 35.0 32.3*    209   GRANS 62 81*   LYMPH 10* 11*   EOS 24* 2      Cardiac Enzymes No results for input(s): CPK, CKND1, GODFREY in the last 72 hours. No lab exists for component: CKRMB, TROIP   Coagulation Recent Labs     03/19/21  0400   PTP 16.0*   INR 1.3*   APTT 35.8       Lipid Panel Lab Results   Component Value Date/Time    Cholesterol, total 152 10/10/2019 12:00 AM    HDL Cholesterol 46 10/10/2019 12:00 AM    LDL, calculated 86 10/10/2019 12:00 AM    VLDL, calculated 20 10/10/2019 12:00 AM    Triglyceride 101 10/10/2019 12:00 AM    CHOL/HDL Ratio 5.6 (H) 05/21/2018 06:23 AM      BNP No results for input(s): BNPP in the last 72 hours.    Liver Enzymes Recent Labs     03/20/21  2121   TP 5.0*   ALB 2.6*         Thyroid Studies Lab Results   Component Value Date/Time    TSH 0.21 (L) 03/19/2021 04:00 AM        Procedures/imaging: see electronic medical records for all procedures/Xrays and details which were not copied into this note but were reviewed prior to creation of Plan

## 2021-03-22 LAB
ANA TITR SER IF: NEGATIVE {TITER}
ANION GAP SERPL CALC-SCNC: 7 MMOL/L (ref 3–18)
BASOPHILS # BLD: 0.1 K/UL (ref 0–0.1)
BASOPHILS NFR BLD: 1 % (ref 0–2)
BUN SERPL-MCNC: 31 MG/DL (ref 7–18)
BUN/CREAT SERPL: 21 (ref 12–20)
CALCIUM SERPL-MCNC: 7.6 MG/DL (ref 8.5–10.1)
CHLORIDE SERPL-SCNC: 116 MMOL/L (ref 100–111)
CO2 SERPL-SCNC: 18 MMOL/L (ref 21–32)
CREAT SERPL-MCNC: 1.46 MG/DL (ref 0.6–1.3)
DIFFERENTIAL METHOD BLD: ABNORMAL
EOSINOPHIL # BLD: 2.9 K/UL (ref 0–0.4)
EOSINOPHIL NFR BLD: 25 % (ref 0–5)
ERYTHROCYTE [DISTWIDTH] IN BLOOD BY AUTOMATED COUNT: 16.4 % (ref 11.6–14.5)
FOLATE SERPL-MCNC: >20 NG/ML (ref 3.1–17.5)
GBM IGG SER-ACNC: 2 UNITS (ref 0–20)
GLUCOSE BLD STRIP.AUTO-MCNC: 106 MG/DL (ref 70–110)
GLUCOSE BLD STRIP.AUTO-MCNC: 127 MG/DL (ref 70–110)
GLUCOSE BLD STRIP.AUTO-MCNC: 138 MG/DL (ref 70–110)
GLUCOSE BLD STRIP.AUTO-MCNC: 98 MG/DL (ref 70–110)
GLUCOSE SERPL-MCNC: 104 MG/DL (ref 74–99)
HCT VFR BLD AUTO: 34 % (ref 35–45)
HGB BLD-MCNC: 10.2 G/DL (ref 12–16)
LYMPHOCYTES # BLD: 3.6 K/UL (ref 0.9–3.6)
LYMPHOCYTES NFR BLD: 30 % (ref 21–52)
MCH RBC QN AUTO: 26.6 PG (ref 24–34)
MCHC RBC AUTO-ENTMCNC: 30 G/DL (ref 31–37)
MCV RBC AUTO: 88.5 FL (ref 74–97)
MONOCYTES # BLD: 0.6 K/UL (ref 0.05–1.2)
MONOCYTES NFR BLD: 5 % (ref 3–10)
NEUTS SEG # BLD: 4.6 K/UL (ref 1.8–8)
NEUTS SEG NFR BLD: 39 % (ref 40–73)
PLATELET # BLD AUTO: 273 K/UL (ref 135–420)
PLEASE NOTE, 734348: NORMAL
PMV BLD AUTO: 10 FL (ref 9.2–11.8)
POTASSIUM SERPL-SCNC: 4 MMOL/L (ref 3.5–5.5)
RBC # BLD AUTO: 3.84 M/UL (ref 4.2–5.3)
SODIUM SERPL-SCNC: 141 MMOL/L (ref 136–145)
VIT B12 SERPL-MCNC: 379 PG/ML (ref 211–911)
WBC # BLD AUTO: 11.9 K/UL (ref 4.6–13.2)

## 2021-03-22 PROCEDURE — 74011250636 HC RX REV CODE- 250/636: Performed by: HOSPITALIST

## 2021-03-22 PROCEDURE — 74011250636 HC RX REV CODE- 250/636: Performed by: INTERNAL MEDICINE

## 2021-03-22 PROCEDURE — 74011250637 HC RX REV CODE- 250/637: Performed by: INTERNAL MEDICINE

## 2021-03-22 PROCEDURE — 80048 BASIC METABOLIC PNL TOTAL CA: CPT

## 2021-03-22 PROCEDURE — 65660000004 HC RM CVT STEPDOWN

## 2021-03-22 PROCEDURE — 74011000250 HC RX REV CODE- 250: Performed by: HOSPITALIST

## 2021-03-22 PROCEDURE — 74011250637 HC RX REV CODE- 250/637: Performed by: HOSPITALIST

## 2021-03-22 PROCEDURE — 97161 PT EVAL LOW COMPLEX 20 MIN: CPT

## 2021-03-22 PROCEDURE — 74011636637 HC RX REV CODE- 636/637: Performed by: HOSPITALIST

## 2021-03-22 PROCEDURE — 97165 OT EVAL LOW COMPLEX 30 MIN: CPT

## 2021-03-22 PROCEDURE — 82962 GLUCOSE BLOOD TEST: CPT

## 2021-03-22 PROCEDURE — 74011000250 HC RX REV CODE- 250: Performed by: INTERNAL MEDICINE

## 2021-03-22 PROCEDURE — 36415 COLL VENOUS BLD VENIPUNCTURE: CPT

## 2021-03-22 PROCEDURE — 82607 VITAMIN B-12: CPT

## 2021-03-22 PROCEDURE — 2709999900 HC NON-CHARGEABLE SUPPLY

## 2021-03-22 PROCEDURE — 85025 COMPLETE CBC W/AUTO DIFF WBC: CPT

## 2021-03-22 PROCEDURE — 97530 THERAPEUTIC ACTIVITIES: CPT

## 2021-03-22 PROCEDURE — 99232 SBSQ HOSP IP/OBS MODERATE 35: CPT | Performed by: HOSPITALIST

## 2021-03-22 RX ORDER — CALCITRIOL 0.25 UG/1
0.25 CAPSULE ORAL DAILY
Status: DISCONTINUED | OUTPATIENT
Start: 2021-03-22 | End: 2021-03-23 | Stop reason: HOSPADM

## 2021-03-22 RX ORDER — AMOXICILLIN AND CLAVULANATE POTASSIUM 875; 125 MG/1; MG/1
1 TABLET, FILM COATED ORAL 2 TIMES DAILY WITH MEALS
Status: DISCONTINUED | OUTPATIENT
Start: 2021-03-22 | End: 2021-03-23 | Stop reason: HOSPADM

## 2021-03-22 RX ORDER — LANOLIN ALCOHOL/MO/W.PET/CERES
1000 CREAM (GRAM) TOPICAL DAILY
Status: DISCONTINUED | OUTPATIENT
Start: 2021-03-23 | End: 2021-03-23 | Stop reason: HOSPADM

## 2021-03-22 RX ADMIN — IPRATROPIUM BROMIDE AND ALBUTEROL SULFATE 3 ML: .5; 3 SOLUTION RESPIRATORY (INHALATION) at 14:00

## 2021-03-22 RX ADMIN — NYSTATIN: 100000 POWDER TOPICAL at 18:00

## 2021-03-22 RX ADMIN — DOXYCYCLINE HYCLATE 100 MG: 100 CAPSULE ORAL at 21:53

## 2021-03-22 RX ADMIN — BUDESONIDE 500 MCG: 0.5 SUSPENSION RESPIRATORY (INHALATION) at 07:55

## 2021-03-22 RX ADMIN — Medication 10 ML: at 14:00

## 2021-03-22 RX ADMIN — CALCITRIOL CAPSULES 0.25 MCG 0.25 MCG: 0.25 CAPSULE ORAL at 09:09

## 2021-03-22 RX ADMIN — DOXYCYCLINE HYCLATE 100 MG: 100 CAPSULE ORAL at 08:05

## 2021-03-22 RX ADMIN — NYSTATIN: 100000 POWDER TOPICAL at 09:00

## 2021-03-22 RX ADMIN — AMOXICILLIN AND CLAVULANATE POTASSIUM 1 TABLET: 500; 125 TABLET, FILM COATED ORAL at 07:56

## 2021-03-22 RX ADMIN — CARVEDILOL 25 MG: 25 TABLET, FILM COATED ORAL at 16:12

## 2021-03-22 RX ADMIN — Medication 5 MG: at 21:48

## 2021-03-22 RX ADMIN — NYSTATIN 500000 UNITS: 500000 SUSPENSION ORAL at 16:12

## 2021-03-22 RX ADMIN — NYSTATIN 500000 UNITS: 500000 SUSPENSION ORAL at 21:49

## 2021-03-22 RX ADMIN — ARFORMOTEROL TARTRATE 15 MCG: 15 SOLUTION RESPIRATORY (INHALATION) at 07:56

## 2021-03-22 RX ADMIN — Medication 10 ML: at 06:31

## 2021-03-22 RX ADMIN — ASPIRIN 162 MG: 81 TABLET, CHEWABLE ORAL at 08:05

## 2021-03-22 RX ADMIN — ATORVASTATIN CALCIUM 80 MG: 40 TABLET, FILM COATED ORAL at 08:05

## 2021-03-22 RX ADMIN — INSULIN GLARGINE 10 UNITS: 100 INJECTION, SOLUTION SUBCUTANEOUS at 21:48

## 2021-03-22 RX ADMIN — IPRATROPIUM BROMIDE AND ALBUTEROL SULFATE 3 ML: .5; 3 SOLUTION RESPIRATORY (INHALATION) at 07:55

## 2021-03-22 RX ADMIN — NYSTATIN 500000 UNITS: 500000 SUSPENSION ORAL at 17:17

## 2021-03-22 RX ADMIN — SODIUM CHLORIDE 50 ML/HR: 900 INJECTION, SOLUTION INTRAVENOUS at 04:15

## 2021-03-22 RX ADMIN — NYSTATIN 500000 UNITS: 500000 SUSPENSION ORAL at 08:05

## 2021-03-22 RX ADMIN — CARVEDILOL 25 MG: 25 TABLET, FILM COATED ORAL at 08:05

## 2021-03-22 RX ADMIN — AMOXICILLIN AND CLAVULANATE POTASSIUM 1 TABLET: 875; 125 TABLET, FILM COATED ORAL at 16:12

## 2021-03-22 RX ADMIN — Medication 1 CAPSULE: at 08:05

## 2021-03-22 RX ADMIN — Medication 10 ML: at 21:51

## 2021-03-22 NOTE — PROGRESS NOTES
conducted a Follow up consultation and Spiritual Assessment for Charlie Castillo, who is a 78 y.o.,female. The  provided the following Interventions:  Continued the relationship of care and support. Chart reviewed. The following outcomes were achieved:  Patient expressed gratitude for 's visit. Assessment:  There are no further spiritual or L[employer] issues which require Spiritual Care Services interventions at this time. Plan:  Chaplains will continue to follow and will provide pastoral care on an as needed/requested basis.  recommends bedside caregivers page  on duty if patient shows signs of acute spiritual or emotional distress.      [address] [facility] Certified [address] [facility]   [phone]

## 2021-03-22 NOTE — PROGRESS NOTES
Problem: Self Care Deficits Care Plan (Adult)  Goal: *Acute Goals and Plan of Care (Insert Text)  Outcome: Resolved/Met       OCCUPATIONAL THERAPY EVALUATION/DISCHARGE    Patient: Franc Miller (11 y.o. female)  Date: 3/22/2021  Primary Diagnosis: Sepsis ([facility] [A41.9]  CARA (acute kidney injury) ([facility] [N17.9]  Precautions: Fall  PLOF: Patient was independent with self-care and functional mobility PTA. ASSESSMENT AND RECOMMENDATIONS:  Based on the objective data described below, the patient presents with no deficits that impede pt function with ADLs, functional transfers, and functional mobility. OT to d/c from caseload at this time. Skilled occupational therapy is not indicated at this time. Discharge Recommendations: Home Health Safety Eval  Further Equipment Recommendations for Discharge: N/A      SUBJECTIVE:   Patient stated \"I dont need help dressing or bathing    OBJECTIVE DATA SUMMARY:     Past Medical History:   Diagnosis Date    Abnormal WBC count 5/17/2016    Anemia     Bilateral shoulder pain 9/27/2016    Chondromalacia of both patellae     COPD (chronic obstructive pulmonary disease) ([facility] 9/2015    mild-mod dz; Dr Jesus Arizmendi    Diabetes [facility]) 2013    Diabetic eye exam ([facility] 2016    Dilated cardiomyopathy ([facility]     Dyslipidemia     Gout     Heart attack ([facility]     Heart attack ([facility]     Heart attack ([facility] 10/18/2019    History of echocardiogram 11/14/2014    Mild LVE. EF 40%. Mild, diffuse hypk. Mild LAE. Mild MR.       Hypercholesteremia 1/08/14    Hypertension 2000    Noncompliance with medications 2/16/2016    Obesity     Orthostatic hypotension 5/17/2016    Osteoarthritis of both knees     Pain management 04/01/2016    Dr. Cyndi Sims     Popliteal cyst, bilateral      Vitamin D deficiency 10/16/14     Past Surgical History:   Procedure Laterality Date    HX HEART CATHETERIZATION      HX TUBAL LIGATION       Barriers to Learning/Limitations: None  Compensate with: visual, verbal, tactile, kinesthetic cues/model    Home Situation:   Home Situation  Home Environment: Private residence  # Steps to Enter: 3  One/Two Story Residence: One story  Living Alone: No  Support Systems: Spouse/Significant Other/Partner  Patient Expects to be Discharged to[de-identified] Private residence  Current DME Used/Available at Home: None  Tub or Shower Type: Tub/Shower combination  [x]     Right hand dominant   []     Left hand dominant    Cognitive/Behavioral Status:  Neurologic State: Alert  Orientation Level: Oriented X4  Cognition: Follows commands  Safety/Judgement: Fall prevention    Skin: Intact  Edema: None noted    Vision/Perceptual:    Acuity: Within Defined Limits      Coordination: BUE  Coordination: Generally decreased, functional  Fine Motor Skills-Upper: Left Intact; Right Intact    Gross Motor Skills-Upper: Left Intact; Right Intact    Balance:  Sitting: Intact  Standing: Impaired; Without support  Standing - Static: Good  Standing - Dynamic : Fair    Strength: BUE  Strength: Generally decreased, functional    Tone & Sensation: BUE  Tone: Normal    Range of Motion: BUE  AROM: Within functional limits      Functional Mobility and Transfers for ADLs:  Supine to Sit: Modified independent  Sit to Supine: Modified independent  Scooting: Modified independent    Transfers:  Sit to Stand: Stand-by assistance  Stand to Sit: Stand-by assistance      ADL Assessment:  Feeding: Independent    Oral Facial Hygiene/Grooming: Independent    Bathing: Modified independent    Upper Body Dressing: Independent    Lower Body Dressing: Modified independent    Toileting: Modified independent    ADL Intervention:  Lower Body Dressing Assistance  Dressing Assistance: Modified independent  Socks: Modified independent  Leg Crossed Method Used: Yes  Position Performed: Seated edge of bed    Cognitive Retraining  Safety/Judgement: Fall prevention    Pain:  Pain level pre-treatment: 0/10   Pain level post-treatment: 0/10   Pain Intervention(s): Medication (see MAR); Response to intervention: Nurse notified, See doc flow    Activity Tolerance:   Fair+    Please refer to the flowsheet for vital signs taken during this treatment. After treatment:   []  Patient left in no apparent distress sitting up in chair  [x]  Patient left in no apparent distress in bed  [x]  Call bell left within reach  [x]  Nursing notified  []  Caregiver present  []  Bed alarm activated    COMMUNICATION/EDUCATION:   [x]      Role of Occupational Therapy in the acute care setting  [x]      Home safety education was provided and the patient/caregiver indicated understanding. [x]      Patient/family have participated as able and agree with findings and recommendations. []      Patient is unable to participate in plan of care at this time. Thank you for this referral.  Klarissa Lacy, OTR/L  Time Calculation: 10 mins      Eval Complexity: History: MEDIUM Complexity : Expanded review of history including physical, cognitive and psychosocial  history ; Examination: LOW Complexity : 1-3 performance deficits relating to physical, cognitive , or psychosocial skils that result in activity limitations and / or participation restrictions ;    Decision Making:LOW Complexity : No comorbidities that affect functional and no verbal or physical assistance needed to complete eval tasks

## 2021-03-22 NOTE — PROGRESS NOTES
Problem: Falls - Risk of  Goal: *Absence of Falls  Description: Document Leafy Byrnes Fall Risk and appropriate interventions in the flowsheet. Outcome: Progressing Towards Goal  Note: Fall Risk Interventions:  Mobility Interventions: Assess mobility with egress test, Bed/chair exit alarm, Communicate number of staff needed for ambulation/transfer, Patient to call before getting OOB         Medication Interventions: Assess postural VS orthostatic hypotension, Bed/chair exit alarm, Evaluate medications/consider consulting pharmacy, Patient to call before getting OOB    Elimination Interventions: Bed/chair exit alarm, Call light in reach, Patient to call for help with toileting needs, Stay With Me (per policy), Toilet paper/wipes in reach, Toileting schedule/hourly rounds    History of Falls Interventions: Bed/chair exit alarm, Evaluate medications/consider consulting pharmacy, Room close to nurse's station         Problem: Patient Education: Go to Patient Education Activity  Goal: Patient/Family Education  Outcome: Progressing Towards Goal     Problem: Diabetes Self-Management  Goal: *Disease process and treatment process  Description: Define diabetes and identify own type of diabetes; list 3 options for treating diabetes. Outcome: Progressing Towards Goal  Goal: *Incorporating nutritional management into lifestyle  Description: Describe effect of type, amount and timing of food on blood glucose; list 3 methods for planning meals. Outcome: Progressing Towards Goal  Goal: *Incorporating physical activity into lifestyle  Description: State effect of exercise on blood glucose levels. Outcome: Progressing Towards Goal  Goal: *Developing strategies to promote health/change behavior  Description: Define the ABC's of diabetes; identify appropriate screenings, schedule and personal plan for screenings.   Outcome: Progressing Towards Goal  Goal: *Using medications safely  Description: State effect of diabetes medications on diabetes; name diabetes medication taking, action and side effects. Outcome: Progressing Towards Goal  Goal: *Monitoring blood glucose, interpreting and using results  Description: Identify recommended blood glucose targets  and personal targets. Outcome: Progressing Towards Goal  Goal: *Prevention, detection, treatment of acute complications  Description: List symptoms of hyper- and hypoglycemia; describe how to treat low blood sugar and actions for lowering  high blood glucose level. Outcome: Progressing Towards Goal  Goal: *Prevention, detection and treatment of chronic complications  Description: Define the natural course of diabetes and describe the relationship of blood glucose levels to long term complications of diabetes. Outcome: Progressing Towards Goal  Goal: *Developing strategies to address psychosocial issues  Description: Describe feelings about living with diabetes; identify support needed and support network  Outcome: Progressing Towards Goal  Goal: *Insulin pump training  Outcome: Progressing Towards Goal  Goal: *Sick day guidelines  Outcome: Progressing Towards Goal  Goal: *Patient Specific Goal (EDIT GOAL, INSERT TEXT)  Outcome: Progressing Towards Goal     Problem: Patient Education: Go to Patient Education Activity  Goal: Patient/Family Education  Outcome: Progressing Towards Goal     Problem: Pressure Injury - Risk of  Goal: *Prevention of pressure injury  Description: Document Vladislav Scale and appropriate interventions in the flowsheet.   Outcome: Progressing Towards Goal  Note: Pressure Injury Interventions:       Moisture Interventions: Absorbent underpads, Apply protective barrier, creams and emollients, Assess need for specialty bed, Check for incontinence Q2 hours and as needed, Limit adult briefs, Maintain skin hydration (lotion/cream), Minimize layers, Moisture barrier, Offer toileting Q_hr    Activity Interventions: Assess need for specialty bed, Increase time out of bed, Pressure redistribution bed/mattress(bed type)    Mobility Interventions: Assess need for specialty bed, HOB 30 degrees or less, Pressure redistribution bed/mattress (bed type), Turn and reposition approx.  every two hours(pillow and wedges)    Nutrition Interventions: Document food/fluid/supplement intake, Offer support with meals,snacks and hydration    Friction and Shear Interventions: Apply protective barrier, creams and emollients, HOB 30 degrees or less, Lift sheet, Lift team/patient mobility team, Minimize layers                Problem: Patient Education: Go to Patient Education Activity  Goal: Patient/Family Education  Outcome: Progressing Towards Goal     Problem: Patient Education: Go to Patient Education Activity  Goal: Patient/Family Education  Outcome: Progressing Towards Goal     Problem: Sepsis: Day of Diagnosis  Goal: Off Pathway (Use only if patient is Off Pathway)  Outcome: Progressing Towards Goal  Goal: *Fluid resuscitation  Outcome: Progressing Towards Goal  Goal: *Paired blood cultures prior to first dose of antibiotic  Outcome: Progressing Towards Goal  Goal: *First dose of  appropriate antibiotic within 3 hours of arrival to ED, within 1 hour of arrival to ICU  Outcome: Progressing Towards Goal  Goal: *Lactic acid with first set of blood cultures  Outcome: Progressing Towards Goal  Goal: *Pneumococcal immunization (if eligible)  Outcome: Progressing Towards Goal  Goal: *Influenza immunization (if eligible)  Outcome: Progressing Towards Goal  Goal: Activity/Safety  Outcome: Progressing Towards Goal  Goal: Consults, if ordered  Outcome: Progressing Towards Goal  Goal: Diagnostic Test/Procedures  Outcome: Progressing Towards Goal  Goal: Nutrition/Diet  Outcome: Progressing Towards Goal  Goal: Discharge Planning  Outcome: Progressing Towards Goal  Goal: Medications  Outcome: Progressing Towards Goal  Goal: Respiratory  Outcome: Progressing Towards Goal  Goal: Treatments/Interventions/Procedures  Outcome: Progressing Towards Goal  Goal: Psychosocial  Outcome: Progressing Towards Goal     Problem: Sepsis: Day 2  Goal: Off Pathway (Use only if patient is Off Pathway)  Outcome: Progressing Towards Goal  Goal: *Central Venous Pressure maintained at 8-12 mm Hg  Outcome: Progressing Towards Goal  Goal: *Hemodynamically stable  Outcome: Progressing Towards Goal  Goal: *Tolerating diet  Outcome: Progressing Towards Goal  Goal: Activity/Safety  Outcome: Progressing Towards Goal  Goal: Consults, if ordered  Outcome: Progressing Towards Goal  Goal: Diagnostic Test/Procedures  Outcome: Progressing Towards Goal  Goal: Nutrition/Diet  Outcome: Progressing Towards Goal  Goal: Discharge Planning  Outcome: Progressing Towards Goal  Goal: Medications  Outcome: Progressing Towards Goal  Goal: Respiratory  Outcome: Progressing Towards Goal  Goal: Treatments/Interventions/Procedures  Outcome: Progressing Towards Goal  Goal: Psychosocial  Outcome: Progressing Towards Goal     Problem: Sepsis: Day 3  Goal: Off Pathway (Use only if patient is Off Pathway)  Outcome: Progressing Towards Goal  Goal: *Central Venous Pressure maintained at 8-12 mm Hg  Outcome: Progressing Towards Goal  Goal: *Oxygen saturation within defined limits  Outcome: Progressing Towards Goal  Goal: *Vital sign stability  Outcome: Progressing Towards Goal  Goal: *Tolerating diet  Outcome: Progressing Towards Goal  Goal: *Demonstrates progressive activity  Outcome: Progressing Towards Goal  Goal: Activity/Safety  Outcome: Progressing Towards Goal  Goal: Consults, if ordered  Outcome: Progressing Towards Goal  Goal: Diagnostic Test/Procedures  Outcome: Progressing Towards Goal  Goal: Nutrition/Diet  Outcome: Progressing Towards Goal  Goal: Discharge Planning  Outcome: Progressing Towards Goal  Goal: Medications  Outcome: Progressing Towards Goal  Goal: Respiratory  Outcome: Progressing Towards Goal  Goal: Treatments/Interventions/Procedures  Outcome: Progressing Towards Goal  Goal: Psychosocial  Outcome: Progressing Towards Goal     Problem: Sepsis: Day 4  Goal: Off Pathway (Use only if patient is Off Pathway)  Outcome: Progressing Towards Goal  Goal: Activity/Safety  Outcome: Progressing Towards Goal  Goal: Consults, if ordered  Outcome: Progressing Towards Goal  Goal: Diagnostic Test/Procedures  Outcome: Progressing Towards Goal  Goal: Nutrition/Diet  Outcome: Progressing Towards Goal  Goal: Discharge Planning  Outcome: Progressing Towards Goal  Goal: Medications  Outcome: Progressing Towards Goal  Goal: Respiratory  Outcome: Progressing Towards Goal  Goal: Treatments/Interventions/Procedures  Outcome: Progressing Towards Goal  Goal: Psychosocial  Outcome: Progressing Towards Goal  Goal: *Oxygen saturation within defined limits  Outcome: Progressing Towards Goal  Goal: *Hemodynamically stable  Outcome: Progressing Towards Goal  Goal: *Vital signs within defined limits  Outcome: Progressing Towards Goal  Goal: *Tolerating diet  Outcome: Progressing Towards Goal  Goal: *Demonstrates progressive activity  Outcome: Progressing Towards Goal  Goal: *Fluid volume maintenance  Outcome: Progressing Towards Goal     Problem: Sepsis: Day 5  Goal: Off Pathway (Use only if patient is Off Pathway)  Outcome: Progressing Towards Goal  Goal: *Oxygen saturation within defined limits  Outcome: Progressing Towards Goal  Goal: *Vital signs within defined limits  Outcome: Progressing Towards Goal  Goal: *Tolerating diet  Outcome: Progressing Towards Goal  Goal: *Demonstrates progressive activity  Outcome: Progressing Towards Goal  Goal: *Discharge plan identified  Outcome: Progressing Towards Goal  Goal: Activity/Safety  Outcome: Progressing Towards Goal  Goal: Consults, if ordered  Outcome: Progressing Towards Goal  Goal: Diagnostic Test/Procedures  Outcome: Progressing Towards Goal  Goal: Nutrition/Diet  Outcome: Progressing Towards Goal  Goal: Discharge Planning  Outcome: Progressing Towards Goal  Goal: Medications  Outcome: Progressing Towards Goal  Goal: Respiratory  Outcome: Progressing Towards Goal  Goal: Treatments/Interventions/Procedures  Outcome: Progressing Towards Goal  Goal: Psychosocial  Outcome: Progressing Towards Goal     Problem: Sepsis: Day 6  Goal: Off Pathway (Use only if patient is Off Pathway)  Outcome: Progressing Towards Goal  Goal: *Oxygen saturation within defined limits  Outcome: Progressing Towards Goal  Goal: *Vital signs within defined limits  Outcome: Progressing Towards Goal  Goal: *Tolerating diet  Outcome: Progressing Towards Goal  Goal: *Demonstrates progressive activity  Outcome: Progressing Towards Goal  Goal: Influenza immunization  Outcome: Progressing Towards Goal  Goal: *Pneumococcal immunization  Outcome: Progressing Towards Goal  Goal: Activity/Safety  Outcome: Progressing Towards Goal  Goal: Diagnostic Test/Procedures  Outcome: Progressing Towards Goal  Goal: Nutrition/Diet  Outcome: Progressing Towards Goal  Goal: Discharge Planning  Outcome: Progressing Towards Goal  Goal: Medications  Outcome: Progressing Towards Goal  Goal: Respiratory  Outcome: Progressing Towards Goal  Goal: Treatments/Interventions/Procedures  Outcome: Progressing Towards Goal  Goal: Psychosocial  Outcome: Progressing Towards Goal     Problem: Sepsis: Discharge Outcomes  Goal: *Vital signs within defined limits  Outcome: Progressing Towards Goal  Goal: *Tolerating diet  Outcome: Progressing Towards Goal  Goal: *Verbalizes understanding and describes prescribed diet  Outcome: Progressing Towards Goal  Goal: *Demonstrates progressive activity  Outcome: Progressing Towards Goal  Goal: *Describes follow-up/return visits to physicians  Outcome: Progressing Towards Goal  Goal: *Verbalizes name, dosage, time, side effects, and number of days to continue medications  Outcome: Progressing Towards Goal  Goal: *Influenza immunization (Oct-Mar only)  Outcome: Progressing Towards Goal  Goal: *Pneumococcal immunization  Outcome: Progressing Towards Goal  Goal: *Lungs clear or at baseline  Outcome: Progressing Towards Goal  Goal: *Oxygen saturation returns to baseline or 90% or better on room air  Outcome: Progressing Towards Goal  Goal: *Glycemic control  Outcome: Progressing Towards Goal  Goal: *Absence of deep venous thrombosis signs and symptoms(Stroke Metric)  Outcome: Progressing Towards Goal  Goal: *Describes available resources and support systems  Outcome: Progressing Towards Goal  Goal: *Optimal pain control at patient's stated goal  Outcome: Progressing Towards Goal

## 2021-03-22 NOTE — PROGRESS NOTES
1910: Bedside and Verbal shift change report given to Shabana Wayne RN (oncoming nurse) by Carolyn Hastings (offgoing nurse). Report included the following information SBAR, Kardex, MAR and Recent Results. SITUATION:    Code Status: Full Code  Reason for Admission: Sepsis ([facility] [A41.9]   CARA (acute kidney injury) ([facility] [N17.9]    [facility] day[facility] 4   Problem List:       Hospital Problems  Date Reviewed: 1/28/2021          Codes Class Noted POA    Hypotension ICD-10-CM: I95.9  ICD-9-CM: 458.9  3/19/2021 Yes        * (Principal) Sepsis ([facility] ICD-10-CM: A41.9  ICD-9-CM: 038.9, 995.91  3/18/2021 Unknown        CARA (acute kidney injury) ([facility] ICD-10-CM: N17.9  ICD-9-CM: 584.9  3/18/2021 Unknown        COPD with acute exacerbation ([facility] ICD-10-CM: J44.1  ICD-9-CM: 491.21  10/19/2019 Yes        CAD (coronary artery disease) ICD-10-CM: I25.10  ICD-9-CM: 414.00  10/18/2019 Yes    Overview Signed 10/24/2019 10:43 AM by Melissa Travis     Added automatically from request for surgery [id number]             Ischemic cardiomyopathy ICD-10-CM: I25.5  ICD-9-CM: 414.8  9/9/2019 Yes        Type 2 diabetes with nephropathy ([facility] ICD-10-CM: E11.21  ICD-9-CM: 250.40, 583.81  7/12/2018 Yes        Severe obesity (BMI 35.0-39. 9) with comorbidity ([facility] ICD-10-CM: E66.01  ICD-9-CM: 278.01  7/12/2018 Yes        Dyslipidemia, goal LDL below 70 ICD-10-CM: E78.5  ICD-9-CM: 272.4  10/22/2015 Yes        Essential hypertension ICD-10-CM: I10  ICD-9-CM: 401.9  9/17/2015 Yes              BACKGROUND:    Past Medical History:   Past Medical History:   Diagnosis Date    Abnormal WBC count 5/17/2016    Anemia     Bilateral shoulder pain 9/27/2016    Chondromalacia of both patellae     COPD (chronic obstructive pulmonary disease) ([facility] 9/2015    mild-mod dz; Dr Theadore Dubin    Diabetes [facility]) 2013    Diabetic eye exam ([facility] 2016    Dilated cardiomyopathy ([facility]     Dyslipidemia     Gout     Heart attack ([facility]     Heart attack ([facility]     Heart attack ([facility] 10/18/2019    History of echocardiogram 11/14/2014    Mild LVE. EF 40%. Mild, diffuse hypk. Mild LAE.   Mild MR.      Hypercholesteremia 1/08/14    Hypertension 2000    Noncompliance with medications 2/16/2016    Obesity     Orthostatic hypotension 5/17/2016    Osteoarthritis of both knees     Pain management 04/01/2016    Dr. Shi Forward Popliteal cyst, bilateral      Vitamin D deficiency 10/16/14         Patient taking anticoagulants no (refused Heparin)     ASSESSMENT:    Changes in Assessment Throughout Shift: None     Patient has Central Line: no Reasons if yes: N/A   Patient has Frye Cath: no Reasons if yes: N/A      Last Vitals:     Vitals:    03/22/21 0709 03/22/21 0750 03/22/21 1135 03/22/21 1540   BP:  (!) 159/81 (!) 101/58 (!) 141/69   Pulse:  81 77 77   Resp:  20 20 20   Temp:  98.9 °F (37.2 °C) 97.8 °F (36.6 °C) 97.7 °F (36.5 °C)   SpO2:  98% 94% 99%   Weight: 110 kg (242 lb 9.6 oz)      Height:            IV and DRAINS (will only show if present)   [REMOVED] Triple Lumen 7FR, 16CM 03/18/21 Left Internal jugular-Site Assessment: Clean, dry, & intact  [REMOVED] External Female Catheter 03/20/21-Site Assessment: Clean, dry, & intact  Peripheral IV 03/20/21 Left;Posterior Hand-Site Assessment: Clean, dry, & intact  [REMOVED] Peripheral IV 03/18/21 Left Hand-Site Assessment: Clean, dry, & intact     WOUND (if present)   Wound Type:  none   Dressing present Dressing Present : No(refuses)   Wound Concerns/Notes:  none     PAIN    Pain Assessment    Pain Intensity 1: 0 (03/22/21 1540)              Patient Stated Pain Goal: 0  o Interventions for Pain:  none  o Intervention effective: no and N/A  o Time of last intervention: N/A   o Reassessment Completed: yes      Last 3 Weights:  Last 3 Recorded Weights in this Encounter    03/20/21 0313 03/21/21 0329 03/22/21 0709   Weight: 115.3 kg (254 lb 1.6 oz) 109.9 kg (242 lb 4.8 oz) 110 kg (242 lb 9.6 oz)     Weight change:  INTAKE/OUPUT    Current Shift: 03/22 0701 - 03/22 1900  In: 900 [P.O.:500; I.V.:400]  Out: 120 [Urine:120]    Last three shifts: 03/20 1901 - 03/22 0700  In: 3257.5 [P.O.:2010; I.V.:1247.5]  Out: 900 [Urine:900]     LAB RESULTS     Recent Labs     03/22/21  0354 03/21/21  1610 03/20/21  0400   WBC 11.9 10.2 9.3   HGB 10.2* 10.5* 10.8*   HCT 34.0* 33.0* 35.0    269 271        Recent Labs     03/22/21  0354 03/21/21  1610 03/20/21  0400    143 144   K 4.0 4.1 4.3   * 119* 112*   BUN 31* 34* 44*   CREA 1.46* 1.61* 2.63*   CA 7.6* 7.8* 7.5*  7.5*   MG  --   --  1.9       RECOMMENDATIONS AND DISCHARGE PLANNING     1. Pending tests/procedures/ Plan of Care or Other Needs: Duplex Upper ext., AM labs    2. Discharge plan for patient and Needs/Barriers: TBD    3. Estimated Discharge Date: TBD Posted on Whiteboard in Patients Room: no      4. The patient's care plan was reviewed with the oncoming nurse. \"HEALS\" SAFETY CHECK      Fall Risk    Total Score: 3    Safety Measures: Safety Measures: Bed/Chair-Wheels locked, Bed in low position, Call light within reach, Fall prevention (comment), Gripper socks, Nurse at bedside, Side rails X 3    A safety check occurred in the patient's room between off going nurse and oncoming nurse listed above.     The safety check included the below items  Area Items   H  High Alert Medications - Verify all high alert medication drips (heparin, PCA, etc.)   E  Equipment - Suction is set up for ALL patients (with yanker)  - Red plugs utilized for all equipment (IV pumps, etc.)  - WOWs wiped down at end of shift.  - Room stocked with oxygen, suction, and other unit-specific supplies   A  Alarms - Bed alarm is set for fall risk patients  - Ensure chair alarm is in place and activated if patient is up in a chair   L  Lines - Check IV for any infiltration  - Frye bag is empty if patient has a Frye   - Tubing and IV bags are labeled   S  Safety   - Room is clean, patient is clean, and equipment is clean. - Hallways are clear from equipment besides carts. - Fall bracelet on for fall risk patients  - Ensure room is clear and free of clutter  - Suction is set up for ALL patients (with rosanne)  - Hallways are clear from equipment besides carts.    - Isolation precautions followed, supplies available outside room, sign posted     Reno Davila

## 2021-03-22 NOTE — PROGRESS NOTES
Hospitalist Progress Note    Patient: Agata Vargas MRN: [id number]  CSN: [id number]    YOB: 1950  Age: 78 y.o. Sex: female    DOA: 3/17/2021 LOS:  LOS: 4 days          tcurrent 97. 7. tmax 100.1. Patient states her face is swollen today. Diarrhea has resolved. Appetite good. She denies chest pain, cough, shortness of breath. Notes swelling to right forearm, where piv was. She wants to go home today. Assessment/Plan     1. Blood cx (3/17/21) s hominis, s epidermidis, likely cons. Repeat blood cx (3/20) ngtd. 2. Hypotension requiring pressors at presentation, resolving. 3. possible uti, cx negative. Frye placed 3/18/21, she pulled it out 3/2021 am.  4. covid biofire negative  5. Diffuse maculopapular rash. Improving slowly. throat culture NORMAL RESPIRATORY KRISTI/NO BETA STREP ISOLATED , final pending. group A strep Ag negative. ID follows. 6. Cad s/p S/P 2 MI and s\STENT - cardiac cath 2019 revealed patent stent to proximal LAD. 7. Severe sepsis poa (fever, tachypnea) with evidence of end organ damage (CARA, acute encephalopathy)  8. Acute metabolic encephalopathy multifactorial improving - gabapentin dose was increased from 300-400 pta, sepsis. 9. received her flu vaccine and her first dose of Covid vaccine, is due for her second dose of Covid 3/18/21  10. Triple lumen placed L internal jugular by ED attending 3/18/2021 2:09 AM. Came out 3/19 overnight  11. Diarrhea improving. c diff negative. Bio k. 12. Chronic anemia since at least 2011. Supplement b12.  13. Obesity, morbid Body mass index is 41.62 kg/m². 14. Hx acute hypoxic respiratory failure requiring intubation 2019, 2/2 pulmonary edema and infiltrates 2/2 CHF exacerbation with PNA and COPD components contributing. 15. Deconditioning/debility: Patient reports being nonambulatory for the last year. Discussed extensively at bedside. 16. Mild - moderate COPD, former tobacco  17. Suspected TESSA  18. Hx gout  19.  dvt prophylaxis  20. Full code. Discharge to home w/ hh 24 - 48º. Denise Villagomez [phone]. Time spent 25 minutes       Additional Notes:      Case discussed with:  [x]Patient  []Family  [x]Nursing  []Case Management  DVT Prophylaxis:  []Lovenox  [x]Hep SQ  []SCDs  []Coumadin   []On Heparin gtt    Vital signs/Intake and Output:  Visit Vitals  BP (!) 141/69 (BP 1 Location: Left lower arm, BP Patient Position: At rest)   Pulse 77   Temp 97.7 °F (36.5 °C)   Resp 20   Ht 5' 4.02\" (1.626 m)   Wt 110 kg (242 lb 9.6 oz)   SpO2 99%   BMI 41.62 kg/m²     Current Shift:  03/22 0701 - 03/22 1900  In: 900 [P.O.:500; I.V.:400]  Out: 120 [Urine:120]  Last three shifts:  03/20 1901 - 03/22 0700  In: 3257.5 [P.O.:2010; I.V.:1247.5]  Out: 900 [Urine:900]      General appearance - alert, awake and oriented x 4 today, appropriate and engaged. Good eye contact. Answering questions, following commands. Heent: ncat. Perrl. Dry mucous membranes. Edentulous. Neck - supple, no significant adenopathy  Chest - clear to auscultation, no wheezes, rales or rhonchi, symmetric air entry  Heart - normal rate, regular rhythm, normal S1, S2  Abdomen - soft, nontender, nondistended, no masses or organomegaly  Neurological - alert, oriented, normal speech, no focal findings or movement disorder noted  Musculoskeletal - no joint tenderness, deformity or swelling  Extremities - swelling to right medial forearm. Radial pulses 2+ b.l. dp 2+ b.l.   Skin - excoriations to  B.l UE.        Medications Reviewed      Labs: Results:       Chemistry Recent Labs     03/22/21  0354 03/21/21  1610 03/20/21  2121 03/20/21  0400   * 119*  --  112*    143  --  144   K 4.0 4.1  --  4.3   * 115*  --  117*   CO2 18* 20*  --  21   BUN 31* 34*  --  44*   CREA 1.46* 1.61*  --  2.63*   CA 7.6* 7.8*  --  7.5*  7.5*   AGAP 7 8  --  6   BUCR 21* 21*  --  17   AP  --   --  116  --    TP  --   --  5.0*  --    ALB  --   --  2.6*  --    GLOB  --   --  2.4 --    AGRAT  --   --  1.1  --       CBC w/Diff Recent Labs     03/22/21  0354 03/21/21  1610 03/20/21  0400   WBC 11.9 10.2 9.3   RBC 3.84* 3.80* 3.96*   HGB 10.2* 10.5* 10.8*   HCT 34.0* 33.0* 35.0    269 271   GRANS 39* 45 62   LYMPH 30 23 10*   EOS 25* 21* 24*      Cardiac Enzymes No results for input(s): CPK, CKND1, GODFREY in the last 72 hours. No lab exists for component: CKRMB, TROIP   Coagulation No results for input(s): PTP, INR, APTT, INREXT, INREXT in the last 72 hours. Lipid Panel Lab Results   Component Value Date/Time    Cholesterol, total 152 10/10/2019 12:00 AM    HDL Cholesterol 46 10/10/2019 12:00 AM    LDL, calculated 86 10/10/2019 12:00 AM    VLDL, calculated 20 10/10/2019 12:00 AM    Triglyceride 101 10/10/2019 12:00 AM    CHOL/HDL Ratio 5.6 (H) 05/21/2018 06:23 AM      BNP No results for input(s): BNPP in the last 72 hours.    Liver Enzymes Recent Labs     03/20/21 2121   TP 5.0*   ALB 2.6*         Thyroid Studies Lab Results   Component Value Date/Time    TSH 0.21 (L) 03/19/2021 04:00 AM        Procedures/imaging: see electronic medical records for all procedures/Xrays and details which were not copied into this note but were reviewed prior to creation of Plan

## 2021-03-22 NOTE — PROGRESS NOTES
Bedside and Verbal shift change report given to Arnav Tuttle RN (oncoming nurse) by Meli [facility] (offgoing nurse). Report included the following information SBAR, Kardex, MAR and Recent Results. SITUATION:    Code Status: Full Code  Reason for Admission: Sepsis ([facility] [A41.9]   CARA (acute kidney injury) ([facility] [N17.9]    [facility] day: 4   Problem List:       Hospital Problems  Date Reviewed: 1/28/2021          Codes Class Noted POA    Hypotension ICD-10-CM: I95.9  ICD-9-CM: 458.9  3/19/2021 Yes        * (Principal) Sepsis ([facility] ICD-10-CM: A41.9  ICD-9-CM: 038.9, 995.91  3/18/2021 Unknown        CARA (acute kidney injury) ([facility] ICD-10-CM: N17.9  ICD-9-CM: 584.9  3/18/2021 Unknown        COPD with acute exacerbation ([facility] ICD-10-CM: J44.1  ICD-9-CM: 491.21  10/19/2019 Yes        CAD (coronary artery disease) ICD-10-CM: I25.10  ICD-9-CM: 414.00  10/18/2019 Yes    Overview Signed 10/24/2019 10:43 AM by Tom Willard     Added automatically from request for surgery [id number]             Ischemic cardiomyopathy ICD-10-CM: I25.5  ICD-9-CM: 414.8  9/9/2019 Yes        Type 2 diabetes with nephropathy ([facility] ICD-10-CM: E11.21  ICD-9-CM: 250.40, 583.81  7/12/2018 Yes        Severe obesity (BMI 35.0-39. 9) with comorbidity ([facility] ICD-10-CM: E66.01  ICD-9-CM: 278.01  7/12/2018 Yes        Dyslipidemia, goal LDL below 70 ICD-10-CM: E78.5  ICD-9-CM: 272.4  10/22/2015 Yes        Essential hypertension ICD-10-CM: I10  ICD-9-CM: 401.9  9/17/2015 Yes              BACKGROUND:    Past Medical History:   Past Medical History:   Diagnosis Date    Abnormal WBC count 5/17/2016    Anemia     Bilateral shoulder pain 9/27/2016    Chondromalacia of both patellae     COPD (chronic obstructive pulmonary disease) ([facility] 9/2015    mild-mod dz; Dr Rashad Rios    Diabetes [facility]) 2013    Diabetic eye exam ([facility] 2016    Dilated cardiomyopathy ([facility]     Dyslipidemia     Gout     Heart attack ([facility]     Heart attack ([facility]     Heart attack ([facility] 10/18/2019    History of echocardiogram 11/14/2014    Mild LVE. EF 40%. Mild, diffuse hypk. Mild LAE.   Mild MR.      Hypercholesteremia 1/08/14    Hypertension 2000    Noncompliance with medications 2/16/2016    Obesity     Orthostatic hypotension 5/17/2016    Osteoarthritis of both knees     Pain management 04/01/2016    Dr. Eduard Cogan Popliteal cyst, bilateral      Vitamin D deficiency 10/16/14         Patient taking anticoagulants no (refused Heparin)     ASSESSMENT:    Changes in Assessment Throughout Shift: None     Patient has Central Line: no Reasons if yes: N/A   Patient has Frye Cath: no Reasons if yes: N/A      Last Vitals:     Vitals:    03/22/21 0320 03/22/21 0415 03/22/21 0631 03/22/21 0709   BP: (!) 120/59      Pulse: 86      Resp: 19      Temp: 100.1 °F (37.8 °C) 99.5 °F (37.5 °C) 97.4 °F (36.3 °C)    SpO2: 97%      Weight:    110 kg (242 lb 9.6 oz)   Height:            IV and DRAINS (will only show if present)   [REMOVED] Triple Lumen 7FR, 16CM 03/18/21 Left Internal jugular-Site Assessment: Clean, dry, & intact  [REMOVED] External Female Catheter 03/20/21-Site Assessment: Clean, dry, & intact  Peripheral IV 03/20/21 Left;Posterior Hand-Site Assessment: Clean, dry, & intact  [REMOVED] Peripheral IV 03/18/21 Left Hand-Site Assessment: Clean, dry, & intact     WOUND (if present)   Wound Type:  none   Dressing present Dressing Present : No(Refuses)   Wound Concerns/Notes:  none     PAIN    Pain Assessment    Pain Intensity 1: 0 (03/22/21 0320)              Patient Stated Pain Goal: 0  o Interventions for Pain:  none  o Intervention effective: no and N/A  o Time of last intervention: N/A   o Reassessment Completed: yes      Last 3 Weights:  Last 3 Recorded Weights in this Encounter    03/20/21 0313 03/21/21 0329 03/22/21 0709   Weight: 115.3 kg (254 lb 1.6 oz) 109.9 kg (242 lb 4.8 oz) 110 kg (242 lb 9.6 oz)     Weight change:      INTAKE/OUPUT    Current Shift: No intake/output data recorded. Last three shifts: 03/20 1901 - 03/22 0700  In: 3257.5 [P.O.:2010; I.V.:1247.5]  Out: 900 [Urine:900]     LAB RESULTS     Recent Labs     03/22/21  0354 03/21/21  1610 03/20/21  0400   WBC 11.9 10.2 9.3   HGB 10.2* 10.5* 10.8*   HCT 34.0* 33.0* 35.0    269 271        Recent Labs     03/22/21  0354 03/21/21  1610 03/20/21  0400    143 144   K 4.0 4.1 4.3   * 119* 112*   BUN 31* 34* 44*   CREA 1.46* 1.61* 2.63*   CA 7.6* 7.8* 7.5*  7.5*   MG  --   --  1.9       RECOMMENDATIONS AND DISCHARGE PLANNING     1. Pending tests/procedures/ Plan of Care or Other Needs: No  2. Discharge plan for patient and Needs/Barriers: TBD    3. Estimated Discharge Date: TBD Posted on Whiteboard in Patients Room: no      4. The patient's care plan was reviewed with the oncoming nurse. \"HEALS\" SAFETY CHECK      Fall Risk    Total Score: 3    Safety Measures: Safety Measures: Bed/Chair alarm on, Bed/Chair-Wheels locked, Bed in low position, Call light within reach, Gripper socks, Side rails X 3    A safety check occurred in the patient's room between off going nurse and oncoming nurse listed above. The safety check included the below items  Area Items   H  High Alert Medications - Verify all high alert medication drips (heparin, PCA, etc.)   E  Equipment - Suction is set up for ALL patients (with yanker)  - Red plugs utilized for all equipment (IV pumps, etc.)  - WOWs wiped down at end of shift.  - Room stocked with oxygen, suction, and other unit-specific supplies   A  Alarms - Bed alarm is set for fall risk patients  - Ensure chair alarm is in place and activated if patient is up in a chair   L  Lines - Check IV for any infiltration  - Frye bag is empty if patient has a Frye   - Tubing and IV bags are labeled   S  Safety   - Room is clean, patient is clean, and equipment is clean. - Hallways are clear from equipment besides carts.    - Fall bracelet on for fall risk patients  - Ensure room is clear and free of clutter  - Suction is set up for ALL patients (with rosanne)  - Hallways are clear from equipment besides carts.    - Isolation precautions followed, supplies available outside room, sign posted     Ardyth Sers

## 2021-03-22 NOTE — PROGRESS NOTES
RENAL DAILY PROGRESS NOTE            82-year-old female with past medical history of COPD, diabetes, hypertension, CKD stage III admitted for fever, sepsis following for renal failure  Subjective:       Complaint:   Overnight events noted  Documented urine output about 450 cc      IMPRESSION:   Acute on chronic kidney injury, prerenal ATN, underlying glomerular etiology cannot be excluded with her presentation   CKD stage III, baseline creatinine around 1.3 to 1.4 mg per DL. Heavy proteinuria, likely diabetic nephropathy  Fever, sepsis, blood culture positive for gram-positive organism  Diastolic heart failure, ejection fractions about 50%  Secondary hyperparathyroidism of CKD   PLAN:   Her initial serology work-up is negative again low suspicion of acute GN here. She is now close to baseline renal function. Today she has moderate acidosis on her lab. I will still go ahead and discontinue IV hydration as I do not suspect volume responsive CARA at present. Monitor acidosis, if remain persistent then start on sodium bicarbonate pills. Continue calcitriol. From renal standpoint okay to discharge. abx per ID colleague, monitor vancomycine level. Discussed with Dr. Jose Rice.        Current Facility-Administered Medications   Medication Dose Route Frequency    calcitRIOL (ROCALTROL) capsule 0.25 mcg  0.25 mcg Oral DAILY    amoxicillin-clavulanate (AUGMENTIN) 875-125 mg per tablet 1 Tab  1 Tab Oral BID WITH MEALS    sodium bicarbonate (8.4%) 150 mEq in dextrose 5% 1,000 mL infusion   IntraVENous CONTINUOUS    L. acidophilus,casei,rhamnosus (BIO-K PLUS) capsule 1 Cap  1 Cap Oral DAILY    aspirin chewable tablet 162 mg  162 mg Oral DAILY    atorvastatin (LIPITOR) tablet 80 mg  80 mg Oral DAILY    carvediloL (COREG) tablet 25 mg  25 mg Oral BID WITH MEALS    sodium chloride (NS) flush 5-40 mL  5-40 mL IntraVENous Q8H    sodium chloride (NS) flush 5-40 mL  5-40 mL IntraVENous PRN    acetaminophen (TYLENOL) tablet 650 mg  650 mg Oral Q6H PRN    Or    acetaminophen (TYLENOL) suppository 650 mg  650 mg Rectal Q6H PRN    polyethylene glycol (MIRALAX) packet 17 g  17 g Oral DAILY PRN    promethazine (PHENERGAN) tablet 12.5 mg  12.5 mg Oral Q6H PRN    Or    ondansetron (ZOFRAN) injection 4 mg  4 mg IntraVENous Q6H PRN    albuterol-ipratropium (DUO-NEB) 2.5 MG-0.5 MG/3 ML  3 mL Nebulization Q6H RT    budesonide (PULMICORT) 500 mcg/2 ml nebulizer suspension  500 mcg Nebulization BID RT    doxycycline (VIBRAMYCIN) capsule 100 mg  100 mg Oral Q12H    arformoteroL (BROVANA) neb solution 15 mcg  15 mcg Nebulization BID RT    melatonin (rapid dissolve) tablet 5 mg  5 mg Oral QHS    heparin (porcine) injection 5,000 Units  5,000 Units SubCUTAneous Q8H    albuterol-ipratropium (DUO-NEB) 2.5 MG-0.5 MG/3 ML  3 mL Nebulization Q6H PRN    insulin glargine (LANTUS) injection 10 Units  10 Units SubCUTAneous QHS    [Held by provider] cefTRIAXone (ROCEPHIN) 2 g in sterile water (preservative free) 20 mL IV syringe  2 g IntraVENous Q24H    sodium chloride (NS) flush 5-10 mL  5-10 mL IntraVENous PRN    acetaminophen (TYLENOL) tablet 650 mg  650 mg Oral Q6H PRN    Or    acetaminophen (TYLENOL) suppository 650 mg  650 mg Rectal Q6H PRN    insulin lispro (HUMALOG) injection   SubCUTAneous AC&HS    glucose chewable tablet 16 g  4 Tab Oral PRN    glucagon (GLUCAGEN) injection 1 mg  1 mg IntraMUSCular PRN    dextrose (D50W) injection syrg 12.5-25 g  25-50 mL IntraVENous PRN    lidocaine (XYLOCAINE) 2 % viscous solution 15 mL  15 mL Mouth/Throat PRN    diphenhydrAMINE (BENADRYL) capsule 25 mg  25 mg Oral Q6H PRN    nystatin (MYCOSTATIN) 100,000 unit/mL oral suspension 500,000 Units  500,000 Units Oral QID    nystatin (MYCOSTATIN) 100,000 unit/gram powder   Topical BID       Review of Symptoms: comprehensive ROS negative except above.    Objective:     Patient Vitals for the past 24 hrs:   Temp Pulse Resp BP SpO2 03/22/21 1540 97.7 °F (36.5 °C) 77 20 (!) 141/69 99 %   03/22/21 1135 97.8 °F (36.6 °C) 77 20 (!) 101/58 94 %   03/22/21 0750 98.9 °F (37.2 °C) 81 20 (!) 159/81 98 %   03/22/21 0631 97.4 °F (36.3 °C) -- -- -- --   03/22/21 0415 99.5 °F (37.5 °C) -- -- -- --   03/22/21 0320 100.1 °F (37.8 °C) 86 19 (!) 120/59 97 %   03/21/21 2322 98.9 °F (37.2 °C) 94 18 131/69 97 %   03/21/21 1926 100 °F (37.8 °C) 81 18 109/73 100 %        Weight change:      03/20 1901 - 03/22 0700  In: 3257.5 [P.O.:2010; I.V.:1247.5]  Out: 900 [Urine:900]    Intake/Output Summary (Last 24 hours) at 3/22/2021 1556  Last data filed at 3/22/2021 1135  Gross per 24 hour   Intake 1580 ml   Output 670 ml   Net 910 ml     Physical Exam:   General: comfortable, no acute distress   HEENT sclera anicteric,   CVS: S1S2 heard,  no rub  RS: + air entry b/l,   Abd: Soft, Non tender, Not distended,  Neuro: non focal, awake, alert , CN II-XII are grossly intact  Extrm: +edema, no cyanosis, clubbing   Skin: no visible  Rash  Musculoskeletal: No gross joints or bone deformities         Data Review:     LABS:   Hematology:   Recent Labs     03/22/21  0354 03/21/21  1610 03/20/21  0400   WBC 11.9 10.2 9.3   HGB 10.2* 10.5* 10.8*   HCT 34.0* 33.0* 35.0     Chemistry:   Recent Labs     03/22/21  0354 03/21/21  1610 03/20/21  2121 03/20/21  0400   BUN 31* 34*  --  44*   CREA 1.46* 1.61*  --  2.63*   CA 7.6* 7.8*  --  7.5*  7.5*   ALB  --   --  2.6*  --    K 4.0 4.1  --  4.3    143  --  144   * 115*  --  117*   CO2 18* 20*  --  21   * 119*  --  112*            Procedures/imaging: see electronic medical records for all procedures, Xrays and details which were not copied into this note but were reviewed prior to creation of Plan          Assessment & Plan:     As above       Claudeen Ruder, MD  3/22/2021  1:05 PM

## 2021-03-22 NOTE — PROGRESS NOTES
Physician Progress Note      Елена Smith  CSN #:                  [id number]  :                       1950  ADMIT DATE:       3/17/2021 11:55 PM  [address] [place] DATE:  RESPONDING  PROVIDER #:        Mariola Duong MD          QUERY TEXT:    Patient admitted with sepsis. Noted documentation of Acute on chronic kidney injury, prerenal ATN per Nephrology consultant. If possible, please document in progress notes and discharge summary:    The medical record reflects the following:  Risk Factors: Hx CKD  Clinical Indicators:  H&P CKD 4-5  Nephrology consult CRF stage 3  Medical notes  CARA  Nephrology notes:  Acute on chronic kidney injury, prerenal ATN, underlying glomerular etiology cannot be excluded with her presentation  Treatment:  IV hydration; BMP    Thank you,  Griselda Hamilton RN/CCDS  [phone]  Options provided:  -- Acute on CKD 3, prerenal ATN confirmed present on admission  -- Acute on CKD 3, prerenal ATN  confirmed not present on admission  -- Acute on CKD 3, prerenal ATN  ruled out  -- Defer to Nephrology consultant documentation regarding Acute on CKD 3, prerenal ATN  -- Other - I will add my own diagnosis  -- Disagree - Not applicable / Not valid  -- Disagree - Clinically unable to determine / Unknown  -- Refer to Clinical Documentation Reviewer    PROVIDER RESPONSE TEXT:    The diagnosis of Acute on CKD 3, prerenal ATN was confirmed as present on admission.     Query created by: Lakisha Cox on 3/22/2021 2:37 PM      Electronically signed by:  Mariola Duong MD 3/22/2021 5:04 PM

## 2021-03-22 NOTE — PROGRESS NOTES
Problem: Mobility Impaired (Adult and Pediatric)  Goal: *Acute Goals and Plan of Care (Insert Text)  Description: Physical Therapy Goals  Initiated 3/22/2021 and to be accomplished within 7 day(s)  1. Patient will move from supine to sit and sit to supine  in bed with independence. 2.  Patient will transfer from bed to chair and chair to bed with independence. 3.  Patient will perform sit to stand with independence. 4.  Patient will ambulate with independence for 200 feet with the least restrictive device. 5.  Patient will ascend/descend 4 stairs with 2 handrail(s) with minimal assistance/contact guard assist.    PLOF: pt ambulates without an assistive device, lives in a single story home with 4 steps to enter,  and son can assist as needed     Outcome: Progressing Towards Goal   PHYSICAL THERAPY EVALUATION    Patient: Barbara Arizmendi (52 y.o. female)  Date: 3/22/2021  Primary Diagnosis: Sepsis ([facility] [A41.9]  CARA (acute kidney injury) ([facility] [N17.9]        Precautions: fall       PLOF: see above    ASSESSMENT :  Based on the objective data described below, the patient presents with decreased strength, balance and activity tolerance resulting in decreased independence with functional mobility. Pt performed bed mobility at a mod I level with use of the bed rails. Standing transfers were also performed at a mod I level with increased time. Pt ambulated 120 feet with no assistive device with SBA and decreased step length and clearance. Pt had one major loss of balance when turning in her room at the end of ambulation requiring mod A to remain standing. She was left sitting in bed with needs in reach and nursing notified. Pt was educated on activity pacing and slowing down with turning and changes in position. Patient will benefit from skilled intervention to address the above impairments.   Patient's rehabilitation potential is considered to be Good  Factors which may influence rehabilitation potential include:   []         None noted  []         Mental ability/status  [x]         Medical condition  []         Home/family situation and support systems  []         Safety awareness  []         Pain tolerance/management  []         Other:      PLAN :  Recommendations and Planned Interventions:   [x]           Bed Mobility Training             []    Neuromuscular Re-Education  [x]           Transfer Training                   []    Orthotic/Prosthetic Training  [x]           Gait Training                          []    Modalities  [x]           Therapeutic Exercises           []    Edema Management/Control  [x]           Therapeutic Activities            []    Family Training/Education  [x]           Patient Education  []           Other (comment):    Frequency/Duration: Patient will be followed by physical therapy 1-2 times per day/4-7 days per week to address goals. Discharge Recommendations: Home Health  Further Equipment Recommendations for Discharge: N/A     SUBJECTIVE:   Patient stated I'm doing pretty good.     OBJECTIVE DATA SUMMARY:     Past Medical History:   Diagnosis Date    Abnormal WBC count 5/17/2016    Anemia     Bilateral shoulder pain 9/27/2016    Chondromalacia of both patellae     COPD (chronic obstructive pulmonary disease) ([address] 9/2015    mild-mod dz; Dr Sander Castro    Diabetes [facility]) 2013    Diabetic eye exam ([facility] 2016    Dilated cardiomyopathy ([address]     Dyslipidemia     Gout     Heart attack ([address]     Heart attack ([address]     Heart attack ([facility] 10/18/2019    History of echocardiogram 11/14/2014    Mild LVE. EF 40%. Mild, diffuse hypk. Mild LAE. Mild MR.       Hypercholesteremia 1/08/14    Hypertension 2000    Noncompliance with medications 2/16/2016    Obesity     Orthostatic hypotension 5/17/2016    Osteoarthritis of both knees     Pain management 04/01/2016    Dr. Rico Coats     Popliteal cyst, bilateral      Vitamin D deficiency 10/16/14     Past Surgical History:   Procedure Laterality Date    HX HEART CATHETERIZATION      HX TUBAL LIGATION       Barriers to Learning/Limitations: None  Compensate with: N/A  Home Situation:  Home Situation  Home Environment: Private residence  # Steps to Enter: 3  One/Two Story Residence: One story  Living Alone: No  Support Systems: Spouse/Significant Other/Partner  Patient Expects to be Discharged to[de-identified] Private residence  Current DME Used/Available at Home: None  Critical Behavior:  Neurologic State: Alert  Orientation Level: Oriented X4  Cognition: Follows commands     Strength:    Strength: Generally decreased, functional      Tone & Sensation:   Tone: Normal       Range Of Motion:  AROM: Within functional limits   Functional Mobility:  Bed Mobility:  Supine to Sit: Modified independent  Sit to Supine: Modified independent     Transfers:  Sit to Stand: Stand-by assistance  Stand to Sit: Stand-by assistance     Balance:   Sitting: Intact  Standing: Without support  Standing - Static: Good  Standing - Dynamic : (fair/fair-)  Ambulation/Gait Training:  Distance (ft): 120 Feet (ft)  Assistive Device: (none)  Ambulation - Level of Assistance: Stand-by assistance(however one large loss of balance at the end requiring mod A)  Gait Abnormalities: Decreased step clearance  Step Length: Right shortened;Left shortened  Therapeutic Exercises: Ankle pumps, LAQ, marching x10  Pain:  Pain level pre-treatment: 0/10   Pain level post-treatment: 0/10   Pain Intervention(s) : n/a    Activity Tolerance:   Fair-  Please refer to the flowsheet for vital signs taken during this treatment.   After treatment:   []         Patient left in no apparent distress sitting up in chair  [x]         Patient left in no apparent distress in bed  [x]         Call bell left within reach  [x]         Nursing notified  []         Caregiver present  []         Bed alarm activated  []         SCDs applied    COMMUNICATION/EDUCATION:   [x]         Role of Physical Therapy in the acute care setting. [x]         Fall prevention education was provided and the patient/caregiver indicated understanding. [x]         Patient/family have participated as able in goal setting and plan of care. [x]         Patient/family agree to work toward stated goals and plan of care. []         Patient understands intent and goals of therapy, but is neutral about his/her participation. []         Patient is unable to participate in goal setting/plan of care: ongoing with therapy staff.  []         Other:     Thank you for this referral.  Trini Long, PT   Time Calculation: 23 mins      Eval Complexity: History: MEDIUM  Complexity : 1-2 comorbidities / personal factors will impact the outcome/ POC Exam:MEDIUM Complexity : 3 Standardized tests and measures addressing body structure, function, activity limitation and / or participation in recreation  Presentation: MEDIUM Complexity : Evolving with changing characteristics  Clinical Decision Making:Medium Complexity    Overall Complexity:MEDIUM

## 2021-03-22 NOTE — PROGRESS NOTES
Problem: Falls - Risk of  Goal: *Absence of Falls  Description: Document Debora Johnson Fall Risk and appropriate interventions in the flowsheet. Outcome: Progressing Towards Goal  Note: Fall Risk Interventions:  Mobility Interventions: Assess mobility with egress test, Bed/chair exit alarm, Communicate number of staff needed for ambulation/transfer, Patient to call before getting OOB         Medication Interventions: Assess postural VS orthostatic hypotension, Bed/chair exit alarm, Evaluate medications/consider consulting pharmacy, Patient to call before getting OOB    Elimination Interventions: Bed/chair exit alarm, Call light in reach, Patient to call for help with toileting needs, Stay With Me (per policy), Toilet paper/wipes in reach, Toileting schedule/hourly rounds    History of Falls Interventions: Bed/chair exit alarm, Evaluate medications/consider consulting pharmacy, Room close to nurse's station         Problem: Patient Education: Go to Patient Education Activity  Goal: Patient/Family Education  Outcome: Progressing Towards Goal     Problem: Diabetes Self-Management  Goal: *Disease process and treatment process  Description: Define diabetes and identify own type of diabetes; list 3 options for treating diabetes. Outcome: Progressing Towards Goal  Goal: *Incorporating nutritional management into lifestyle  Description: Describe effect of type, amount and timing of food on blood glucose; list 3 methods for planning meals. Outcome: Progressing Towards Goal  Goal: *Incorporating physical activity into lifestyle  Description: State effect of exercise on blood glucose levels. Outcome: Progressing Towards Goal  Goal: *Developing strategies to promote health/change behavior  Description: Define the ABC's of diabetes; identify appropriate screenings, schedule and personal plan for screenings.   Outcome: Progressing Towards Goal  Goal: *Using medications safely  Description: State effect of diabetes medications on diabetes; name diabetes medication taking, action and side effects. Outcome: Progressing Towards Goal  Goal: *Monitoring blood glucose, interpreting and using results  Description: Identify recommended blood glucose targets  and personal targets. Outcome: Progressing Towards Goal  Goal: *Prevention, detection, treatment of acute complications  Description: List symptoms of hyper- and hypoglycemia; describe how to treat low blood sugar and actions for lowering  high blood glucose level. Outcome: Progressing Towards Goal  Goal: *Prevention, detection and treatment of chronic complications  Description: Define the natural course of diabetes and describe the relationship of blood glucose levels to long term complications of diabetes. Outcome: Progressing Towards Goal  Goal: *Developing strategies to address psychosocial issues  Description: Describe feelings about living with diabetes; identify support needed and support network  Outcome: Progressing Towards Goal  Goal: *Patient Specific Goal (EDIT GOAL, INSERT TEXT)  Outcome: Progressing Towards Goal     Problem: Patient Education: Go to Patient Education Activity  Goal: Patient/Family Education  Outcome: Progressing Towards Goal     Problem: Pressure Injury - Risk of  Goal: *Prevention of pressure injury  Description: Document Vladislav Scale and appropriate interventions in the flowsheet.   Outcome: Progressing Towards Goal  Note: Pressure Injury Interventions:       Moisture Interventions: Absorbent underpads, Apply protective barrier, creams and emollients, Assess need for specialty bed, Check for incontinence Q2 hours and as needed, Limit adult briefs, Maintain skin hydration (lotion/cream), Minimize layers, Moisture barrier, Offer toileting Q_hr    Activity Interventions: Assess need for specialty bed, Increase time out of bed, Pressure redistribution bed/mattress(bed type)    Mobility Interventions: Assess need for specialty bed, HOB 30 degrees or less, Pressure redistribution bed/mattress (bed type), Turn and reposition approx. every two hours(pillow and wedges)    Nutrition Interventions: Document food/fluid/supplement intake, Offer support with meals,snacks and hydration    Friction and Shear Interventions: Apply protective barrier, creams and emollients, HOB 30 degrees or less, Lift sheet, Lift team/patient mobility team, Minimize layers                Problem: Patient Education: Go to Patient Education Activity  Goal: Patient/Family Education  Outcome: Progressing Towards Goal     Problem: Sepsis: Day 5  Goal: *Oxygen saturation within defined limits  Outcome: Progressing Towards Goal  Goal: *Vital signs within defined limits  Outcome: Progressing Towards Goal  Goal: *Tolerating diet  Outcome: Progressing Towards Goal  Goal: *Demonstrates progressive activity  Outcome: Progressing Towards Goal  Goal: *Discharge plan identified  Outcome: Progressing Towards Goal  Goal: Activity/Safety  Outcome: Progressing Towards Goal  Goal: Consults, if ordered  Outcome: Progressing Towards Goal  Goal: Diagnostic Test/Procedures  Outcome: Progressing Towards Goal  Goal: Nutrition/Diet  Outcome: Progressing Towards Goal  Goal: Discharge Planning  Outcome: Progressing Towards Goal  Goal: Medications  Outcome: Progressing Towards Goal  Goal: Respiratory  Outcome: Progressing Towards Goal  Goal: Treatments/Interventions/Procedures  Outcome: Progressing Towards Goal  Goal: Psychosocial  Outcome: Progressing Towards Goal     Problem: Risk for Spread of Infection  Goal: Prevent transmission of infectious organism to others  Description: Prevent the transmission of infectious organisms to other patients, staff members, and visitors.   Outcome: Progressing Towards Goal     Problem: Patient Education:  Go to Education Activity  Goal: Patient/Family Education  Outcome: Progressing Towards Goal

## 2021-03-22 NOTE — PROGRESS NOTES
Infectious Disease progress Note        Reason: Septic shock    Current abx Prior abx   Augmentin, doxycycline since 3/20 Pip/tazo, vancomycin 3/18-3/20     Lines:       Assessment :  78 y.o. female with past medical history of CAD s/p 2 MI in the past s/p stent in 2020/diabetes mellitus type 2 uncontrolled high blood glucose/morbid obesity/hypertension/COPD/CKD 3  Admitted to [facility] on 3/17/21  with history of increasing pruritus generalized along with likely developing high fever, severe oropharyngeal pain burning pain. Now with gram-positive bacteremia, hypotension requiring pressors, worsening renal function    Patient presents with a highly complex clinical picture. Difficulty determine exact etiology of patient's presentation at this time. differential diagnosis includes severe sepsis secondary to bacterial pharyngitis due to pathogens such as arcanobacterium hemolyticum complicated by hypotension/CARA due to hypovolemia. Coagulase-negative Staphylococcus in blood culture 3/17 likely contaminant. Negative repeat blood cultures. No evidence of skin and soft tissue infection as the source of coagulase-negative Staphylococcus bacteremia. Both sets were done 1 minute apart and hence still could represent contamination    Acute on chronic kidney disease-nephrology follow-up appreciated    Rash-likely due to bacterial pharyngitis- improved    Persistent fever 3/20- likely due to missed abx due to lack of iv access. Multiple attempts at IV access had failed. Started on po abx 3/20    Diarrhea- likely antibiotic associated. negative c.diff test 3/21    Rec:  -Continue po augmentin, doxycycline till 3/30/2021  -Follow-up results of throat culture  -Monitor CBC, temperature, clinically  - recommend probiotics     Above plan was discussed in details with patient, RN, . Please call me if any further questions or concerns. Will continue to participate in the care of this patient. HPI:  Feels better. Improved diarrhea. Resolved sore throat. No further itching. Improved rash. Denies any increasing shortness of breath, cough, abdominal pain. home Medication List    Details   atorvastatin (LIPITOR) 80 mg tablet Take 1 Tab by mouth daily. Qty: 90 Tab, Refills: 3    Associated Diagnoses: Dyslipidemia, goal LDL below 70      carvediloL (COREG) 25 mg tablet Take 1 Tab by mouth two (2) times daily (with meals). Qty: 180 Tab, Refills: 3    Associated Diagnoses: Essential hypertension; Chronic systolic congestive heart failure (HCC)      sacubitriL-valsartan (Entresto)  mg tablet Take 1 Tab by mouth two (2) times a day. Qty: 180 Tab, Refills: 2    Associated Diagnoses: Chronic systolic congestive heart failure (HCC)      furosemide (LASIX) 40 mg tablet Take 1 Tab by mouth two (2) times daily as needed (edema). Qty: 180 Tab, Refills: 1    Associated Diagnoses: Chronic systolic congestive heart failure (HCC)      tiZANidine (ZANAFLEX) 4 mg tablet Take 4 mg by mouth two (2) times a day. lidocaine (Lidoderm) 5 % Apply patch to the affected area for 12 hours a day and remove for 12 hours a day. Qty: 1 Package, Refills: 0      pantoprazole (PROTONIX) 40 mg tablet Take 1 Tab by mouth daily. Qty: 30 Tab, Refills: 0      albuterol (PROVENTIL HFA, VENTOLIN HFA, PROAIR HFA) 90 mcg/actuation inhaler Take 2 Puffs by inhalation every four (4) hours as needed for Wheezing. Qty: 1 Inhaler, Refills: 0      aspirin 81 mg chewable tablet Take 2 Tabs by mouth daily. Qty: 60 Tab, Refills: 0    Associated Diagnoses: Coronary artery disease involving native coronary artery of native heart without angina pectoris      insulin detemir (LEVEMIR) 100 unit/mL injection 15 units daily for diabetes  Qty: 2 Vial, Refills: 0    Associated Diagnoses: Diabetes mellitus type 2, insulin dependent (HCC)      desloratadine (CLARINEX) 5 mg tablet Take 1 Tab by mouth daily as needed for Allergies.   Qty: 30 Tab, Refills: 0 Associated Diagnoses: Environmental allergies      mometasone (NASONEX) 50 mcg/actuation nasal spray 2 Sprays by Both Nostrils route daily as needed. For allergies  Qty: 3 Container, Refills: 3    Associated Diagnoses: Environmental allergies      umeclidinium-vilanterol (ANORO ELLIPTA) 62.5-25 mcg/actuation inhaler Take 1 Puff by inhalation daily. For COPD  Qty: 3 Inhaler, Refills: 3    Comments: 9/22/15 Dr Stacie Jimenez prescribed.  Will order via [facility] if available  Associated Diagnoses: Chronic obstructive pulmonary disease, unspecified COPD type ([facility]             Current Facility-Administered Medications   Medication Dose Route Frequency    calcitRIOL (ROCALTROL) capsule 0.25 mcg  0.25 mcg Oral DAILY    amoxicillin-clavulanate (AUGMENTIN) 875-125 mg per tablet 1 Tab  1 Tab Oral BID WITH MEALS    L. acidophilus,casei,rhamnosus (BIO-K PLUS) capsule 1 Cap  1 Cap Oral DAILY    aspirin chewable tablet 162 mg  162 mg Oral DAILY    atorvastatin (LIPITOR) tablet 80 mg  80 mg Oral DAILY    carvediloL (COREG) tablet 25 mg  25 mg Oral BID WITH MEALS    sodium chloride (NS) flush 5-40 mL  5-40 mL IntraVENous Q8H    sodium chloride (NS) flush 5-40 mL  5-40 mL IntraVENous PRN    acetaminophen (TYLENOL) tablet 650 mg  650 mg Oral Q6H PRN    Or    acetaminophen (TYLENOL) suppository 650 mg  650 mg Rectal Q6H PRN    polyethylene glycol (MIRALAX) packet 17 g  17 g Oral DAILY PRN    promethazine (PHENERGAN) tablet 12.5 mg  12.5 mg Oral Q6H PRN    Or    ondansetron (ZOFRAN) injection 4 mg  4 mg IntraVENous Q6H PRN    albuterol-ipratropium (DUO-NEB) 2.5 MG-0.5 MG/3 ML  3 mL Nebulization Q6H RT    budesonide (PULMICORT) 500 mcg/2 ml nebulizer suspension  500 mcg Nebulization BID RT    doxycycline (VIBRAMYCIN) capsule 100 mg  100 mg Oral Q12H    arformoteroL (BROVANA) neb solution 15 mcg  15 mcg Nebulization BID RT    melatonin (rapid dissolve) tablet 5 mg  5 mg Oral QHS    heparin (porcine) injection 5,000 Units  5,000 Units SubCUTAneous Q8H    albuterol-ipratropium (DUO-NEB) 2.5 MG-0.5 MG/3 ML  3 mL Nebulization Q6H PRN    0.9% sodium chloride infusion  50 mL/hr IntraVENous CONTINUOUS    insulin glargine (LANTUS) injection 10 Units  10 Units SubCUTAneous QHS    [Held by provider] cefTRIAXone (ROCEPHIN) 2 g in sterile water (preservative free) 20 mL IV syringe  2 g IntraVENous Q24H    sodium chloride (NS) flush 5-10 mL  5-10 mL IntraVENous PRN    acetaminophen (TYLENOL) tablet 650 mg  650 mg Oral Q6H PRN    Or    acetaminophen (TYLENOL) suppository 650 mg  650 mg Rectal Q6H PRN    insulin lispro (HUMALOG) injection   SubCUTAneous AC&HS    glucose chewable tablet 16 g  4 Tab Oral PRN    glucagon (GLUCAGEN) injection 1 mg  1 mg IntraMUSCular PRN    dextrose (D50W) injection syrg 12.5-25 g  25-50 mL IntraVENous PRN    lidocaine (XYLOCAINE) 2 % viscous solution 15 mL  15 mL Mouth/Throat PRN    diphenhydrAMINE (BENADRYL) capsule 25 mg  25 mg Oral Q6H PRN    nystatin (MYCOSTATIN) 100,000 unit/mL oral suspension 500,000 Units  500,000 Units Oral QID    nystatin (MYCOSTATIN) 100,000 unit/gram powder   Topical BID       Allergies: Patient has no known allergies. Temp (24hrs), Av.9 °F (37.2 °C), Min:97.4 °F (36.3 °C), Max:100.1 °F (37.8 °C)    Visit Vitals  BP (!) 159/81 (BP 1 Location: Right upper arm, BP Patient Position: At rest)   Pulse 81   Temp 98.9 °F (37.2 °C)   Resp 20   Ht 5' 4.02\" (1.626 m) Comment: Nephrology office visit   Wt 110 kg (242 lb 9.6 oz)   SpO2 98%   BMI 41.62 kg/m²       ROS: 12 point ROS obtained in details.  Pertinent positives as mentioned in HPI,   otherwise negative    Physical Exam:    General appearance - alert, well appearing, and in no distress, oriented to person, place, and time and overweight  Mental status - alert, oriented to person, place, and time  Eyes - pupils equal and reactive, extraocular eye movements intact  Ears - bilateral TM's and external ear canals normal  Nose - normal and patent, no erythema, discharge or polyps  Mouth - mucous membranes moist, pharynx normal without lesions  Neck - supple, no significant adenopathy  Chest - clear to auscultation, no wheezes, rales or rhonchi, symmetric air entry  Heart - normal rate, regular rhythm, normal S1, S2, no rubs, clicks or gallops  Abdomen - soft, nontender, nondistended, no masses or organomegaly  Breasts -rash underneath both breast  Neurological - alert, oriented, normal speech, no focal findings or movement disorder noted  Musculoskeletal - no joint tenderness, deformity or swelling  Extremities - peripheral pulses normal, no pedal edema, no clubbing or cyanosis  Skin - rash on extremities, trunk almost completely resolved    Labs: Results:   Chemistry Recent Labs     03/22/21  0354 03/21/21  1610 03/20/21 2121 03/20/21  0400   * 119*  --  112*    143  --  144   K 4.0 4.1  --  4.3   * 115*  --  117*   CO2 18* 20*  --  21   BUN 31* 34*  --  44*   CREA 1.46* 1.61*  --  2.63*   CA 7.6* 7.8*  --  7.5*  7.5*   AGAP 7 8  --  6   BUCR 21* 21*  --  17   AP  --   --  116  --    TP  --   --  5.0*  --    ALB  --   --  2.6*  --    GLOB  --   --  2.4  --    AGRAT  --   --  1.1  --       CBC w/Diff Recent Labs     03/22/21  0354 03/21/21  1610 03/20/21  0400   WBC 11.9 10.2 9.3   RBC 3.84* 3.80* 3.96*   HGB 10.2* 10.5* 10.8*   HCT 34.0* 33.0* 35.0    269 271   GRANS 39* 45 62   LYMPH 30 23 10*   EOS 25* 21* 24*      Microbiology Recent Labs     03/20/21  1421   CULT NO GROWTH 2 DAYS          RADIOLOGY:    All available imaging studies/reports in [facility] for this admission were reviewed      Disclaimer: Sections of this note are dictated utilizing voice recognition software, which may have resulted in some phonetic based errors in grammar and contents. Even though attempts were made to correct all the mistakes, some may have been missed, and remained in the body of the document.  If questions arise, please contact our department.     Dr. Bijal Barraza, Infectious Disease Specialist  [phone]  March 22, 2021  9:47 AM

## 2021-03-23 ENCOUNTER — HOME HEALTH ADMISSION (OUTPATIENT)
Dept: HOME HEALTH SERVICES | Facility: HOME HEALTH | Age: 71
End: 2021-03-23

## 2021-03-23 ENCOUNTER — APPOINTMENT (OUTPATIENT)
Dept: VASCULAR SURGERY | Age: 71
DRG: 871 | End: 2021-03-23
Attending: HOSPITALIST
Payer: MEDICARE

## 2021-03-23 VITALS
SYSTOLIC BLOOD PRESSURE: 129 MMHG | DIASTOLIC BLOOD PRESSURE: 65 MMHG | RESPIRATION RATE: 18 BRPM | WEIGHT: 242.6 LBS | HEART RATE: 84 BPM | BODY MASS INDEX: 41.42 KG/M2 | TEMPERATURE: 98 F | HEIGHT: 64 IN | OXYGEN SATURATION: 97 %

## 2021-03-23 LAB
ALBUMIN SERPL ELPH-MCNC: 2.8 G/DL (ref 2.9–4.4)
ALBUMIN SERPL-MCNC: 2.7 G/DL (ref 3.4–5)
ALBUMIN/GLOB SERPL: 1 {RATIO} (ref 0.8–1.7)
ALBUMIN/GLOB SERPL: 1.1 {RATIO} (ref 0.7–1.7)
ALP SERPL-CCNC: 153 U/L (ref 45–117)
ALPHA1 GLOB SERPL ELPH-MCNC: 0.3 G/DL (ref 0–0.4)
ALPHA2 GLOB SERPL ELPH-MCNC: 1.1 G/DL (ref 0.4–1)
ALT SERPL-CCNC: 75 U/L (ref 13–56)
ANION GAP SERPL CALC-SCNC: 8 MMOL/L (ref 3–18)
AST SERPL-CCNC: 35 U/L (ref 10–38)
B-GLOBULIN SERPL ELPH-MCNC: 0.9 G/DL (ref 0.7–1.3)
BACTERIA SPEC CULT: NORMAL
BASOPHILS # BLD: 0 K/UL (ref 0–0.06)
BASOPHILS NFR BLD: 0 % (ref 0–3)
BILIRUB DIRECT SERPL-MCNC: 0.1 MG/DL (ref 0–0.2)
BILIRUB SERPL-MCNC: 0.5 MG/DL (ref 0.2–1)
BUN SERPL-MCNC: 26 MG/DL (ref 7–18)
BUN/CREAT SERPL: 20 (ref 12–20)
C-ANCA TITR SER IF: NORMAL TITER
CALCIUM SERPL-MCNC: 7.7 MG/DL (ref 8.5–10.1)
CHLORIDE SERPL-SCNC: 115 MMOL/L (ref 100–111)
CMV DNA SERPL NAA+PROBE-ACNC: NEGATIVE IU/ML
CMV DNA SERPL NAA+PROBE-LOG IU: NORMAL LOG10 IU/ML
CO2 SERPL-SCNC: 19 MMOL/L (ref 21–32)
CREAT SERPL-MCNC: 1.33 MG/DL (ref 0.6–1.3)
DIFFERENTIAL METHOD BLD: ABNORMAL
EOSINOPHIL # BLD: 4.3 K/UL (ref 0–0.4)
EOSINOPHIL NFR BLD: 35 % (ref 0–5)
ERYTHROCYTE [DISTWIDTH] IN BLOOD BY AUTOMATED COUNT: 16.4 % (ref 11.6–14.5)
GAMMA GLOB SERPL ELPH-MCNC: 0.4 G/DL (ref 0.4–1.8)
GLOBULIN SER CALC-MCNC: 2.6 G/DL (ref 2–4)
GLOBULIN SER-MCNC: 2.7 G/DL (ref 2.2–3.9)
GLUCOSE BLD STRIP.AUTO-MCNC: 112 MG/DL (ref 70–110)
GLUCOSE BLD STRIP.AUTO-MCNC: 132 MG/DL (ref 70–110)
GLUCOSE BLD STRIP.AUTO-MCNC: 158 MG/DL (ref 70–110)
GLUCOSE SERPL-MCNC: 103 MG/DL (ref 74–99)
HCT VFR BLD AUTO: 32.5 % (ref 35–45)
HGB BLD-MCNC: 10.2 G/DL (ref 12–16)
IGA SERPL-MCNC: 69 MG/DL (ref 87–352)
IGG SERPL-MCNC: 438 MG/DL (ref 586–1602)
IGM SERPL-MCNC: 45 MG/DL (ref 26–217)
INTERPRETATION SERPL IEP-IMP: ABNORMAL
KAPPA LC FREE SER-MCNC: 18 MG/L (ref 3.3–19.4)
KAPPA LC FREE/LAMBDA FREE SER: 1.46 {RATIO} (ref 0.26–1.65)
LAMBDA LC FREE SERPL-MCNC: 12.3 MG/L (ref 5.7–26.3)
LYMPHOCYTES # BLD: 2.7 K/UL (ref 0.8–3.5)
LYMPHOCYTES NFR BLD: 22 % (ref 20–51)
M PROTEIN SERPL ELPH-MCNC: ABNORMAL G/DL
MCH RBC QN AUTO: 27.3 PG (ref 24–34)
MCHC RBC AUTO-ENTMCNC: 31.4 G/DL (ref 31–37)
MCV RBC AUTO: 87.1 FL (ref 74–97)
MONOCYTES # BLD: 0.9 K/UL (ref 0–1)
MONOCYTES NFR BLD: 7 % (ref 2–9)
MYELOPEROXIDASE AB SER IA-ACNC: <9 U/ML (ref 0–9)
NEUTS SEG # BLD: 4.5 K/UL (ref 1.8–8)
NEUTS SEG NFR BLD: 36 % (ref 42–75)
NRBC BLD-RTO: 1 PER 100 WBC
P-ANCA ATYPICAL TITR SER IF: NORMAL TITER
P-ANCA TITR SER IF: NORMAL TITER
PLATELET # BLD AUTO: 283 K/UL (ref 135–420)
PLATELET COMMENTS,PCOM: ABNORMAL
PMV BLD AUTO: 9.5 FL (ref 9.2–11.8)
POTASSIUM SERPL-SCNC: 4.1 MMOL/L (ref 3.5–5.5)
PROT SERPL-MCNC: 5.3 G/DL (ref 6.4–8.2)
PROT SERPL-MCNC: 5.5 G/DL (ref 6–8.5)
PROTEINASE3 AB SER IA-ACNC: <3.5 U/ML (ref 0–3.5)
RBC # BLD AUTO: 3.73 M/UL (ref 4.2–5.3)
RBC MORPH BLD: ABNORMAL
RBC MORPH BLD: ABNORMAL
SERVICE CMNT-IMP: NORMAL
SODIUM SERPL-SCNC: 142 MMOL/L (ref 136–145)
WBC # BLD AUTO: 12.4 K/UL (ref 4.6–13.2)

## 2021-03-23 PROCEDURE — 85025 COMPLETE CBC W/AUTO DIFF WBC: CPT

## 2021-03-23 PROCEDURE — 74011000250 HC RX REV CODE- 250: Performed by: INTERNAL MEDICINE

## 2021-03-23 PROCEDURE — 74011250637 HC RX REV CODE- 250/637: Performed by: HOSPITALIST

## 2021-03-23 PROCEDURE — 36415 COLL VENOUS BLD VENIPUNCTURE: CPT

## 2021-03-23 PROCEDURE — 74011250637 HC RX REV CODE- 250/637: Performed by: INTERNAL MEDICINE

## 2021-03-23 PROCEDURE — 80076 HEPATIC FUNCTION PANEL: CPT

## 2021-03-23 PROCEDURE — 93971 EXTREMITY STUDY: CPT

## 2021-03-23 PROCEDURE — 74011000250 HC RX REV CODE- 250: Performed by: HOSPITALIST

## 2021-03-23 PROCEDURE — 80048 BASIC METABOLIC PNL TOTAL CA: CPT

## 2021-03-23 PROCEDURE — 2709999900 HC NON-CHARGEABLE SUPPLY

## 2021-03-23 PROCEDURE — 99239 HOSP IP/OBS DSCHRG MGMT >30: CPT | Performed by: HOSPITALIST

## 2021-03-23 PROCEDURE — 97116 GAIT TRAINING THERAPY: CPT

## 2021-03-23 PROCEDURE — 82962 GLUCOSE BLOOD TEST: CPT

## 2021-03-23 RX ORDER — IPRATROPIUM BROMIDE AND ALBUTEROL SULFATE 2.5; .5 MG/3ML; MG/3ML
3 SOLUTION RESPIRATORY (INHALATION)
Qty: 30 NEBULE | Refills: 0 | Status: SHIPPED | OUTPATIENT
Start: 2021-03-23

## 2021-03-23 RX ORDER — DOXYCYCLINE 100 MG/1
100 CAPSULE ORAL EVERY 12 HOURS
Qty: 14 CAP | Refills: 0 | Status: SHIPPED | OUTPATIENT
Start: 2021-03-23 | End: 2021-03-30

## 2021-03-23 RX ORDER — AMOXICILLIN AND CLAVULANATE POTASSIUM 875; 125 MG/1; MG/1
1 TABLET, FILM COATED ORAL 2 TIMES DAILY WITH MEALS
Qty: 14 TAB | Refills: 0 | Status: SHIPPED | OUTPATIENT
Start: 2021-03-23 | End: 2021-03-30

## 2021-03-23 RX ORDER — CALCITRIOL 0.25 UG/1
0.25 CAPSULE ORAL DAILY
Qty: 30 CAP | Refills: 0 | Status: SHIPPED | OUTPATIENT
Start: 2021-03-24 | End: 2022-09-22

## 2021-03-23 RX ORDER — LANOLIN ALCOHOL/MO/W.PET/CERES
1000 CREAM (GRAM) TOPICAL DAILY
Qty: 30 TAB | Refills: 0 | Status: ON HOLD | OUTPATIENT
Start: 2021-03-24 | End: 2022-06-27

## 2021-03-23 RX ORDER — FUROSEMIDE 40 MG/1
40 TABLET ORAL DAILY
Status: DISCONTINUED | OUTPATIENT
Start: 2021-03-23 | End: 2021-03-23 | Stop reason: HOSPADM

## 2021-03-23 RX ORDER — FUROSEMIDE 40 MG/1
40 TABLET ORAL DAILY
Qty: 30 TAB | Refills: 0 | Status: ON HOLD | OUTPATIENT
Start: 2021-03-23 | End: 2021-12-19

## 2021-03-23 RX ADMIN — NYSTATIN 500000 UNITS: 500000 SUSPENSION ORAL at 08:43

## 2021-03-23 RX ADMIN — Medication 1 CAPSULE: at 08:43

## 2021-03-23 RX ADMIN — IPRATROPIUM BROMIDE AND ALBUTEROL SULFATE 3 ML: .5; 3 SOLUTION RESPIRATORY (INHALATION) at 08:43

## 2021-03-23 RX ADMIN — ASPIRIN 162 MG: 81 TABLET, CHEWABLE ORAL at 08:41

## 2021-03-23 RX ADMIN — IPRATROPIUM BROMIDE AND ALBUTEROL SULFATE 3 ML: .5; 3 SOLUTION RESPIRATORY (INHALATION) at 14:00

## 2021-03-23 RX ADMIN — Medication 10 ML: at 06:02

## 2021-03-23 RX ADMIN — ARFORMOTEROL TARTRATE 15 MCG: 15 SOLUTION RESPIRATORY (INHALATION) at 08:44

## 2021-03-23 RX ADMIN — Medication 10 ML: at 14:00

## 2021-03-23 RX ADMIN — ATORVASTATIN CALCIUM 80 MG: 40 TABLET, FILM COATED ORAL at 08:43

## 2021-03-23 RX ADMIN — AMOXICILLIN AND CLAVULANATE POTASSIUM 1 TABLET: 875; 125 TABLET, FILM COATED ORAL at 08:41

## 2021-03-23 RX ADMIN — NYSTATIN 500000 UNITS: 500000 SUSPENSION ORAL at 13:00

## 2021-03-23 RX ADMIN — CALCITRIOL CAPSULES 0.25 MCG 0.25 MCG: 0.25 CAPSULE ORAL at 08:43

## 2021-03-23 RX ADMIN — BUDESONIDE 500 MCG: 0.5 SUSPENSION RESPIRATORY (INHALATION) at 08:41

## 2021-03-23 RX ADMIN — NYSTATIN: 100000 POWDER TOPICAL at 09:00

## 2021-03-23 RX ADMIN — CARVEDILOL 25 MG: 25 TABLET, FILM COATED ORAL at 08:43

## 2021-03-23 RX ADMIN — CYANOCOBALAMIN TAB 1000 MCG 1000 MCG: 1000 TAB at 08:45

## 2021-03-23 RX ADMIN — DOXYCYCLINE HYCLATE 100 MG: 100 CAPSULE ORAL at 08:43

## 2021-03-23 RX ADMIN — FUROSEMIDE 40 MG: 40 TABLET ORAL at 12:00

## 2021-03-23 NOTE — HOME CARE
Discharge noted for today. Received home health referral for [facility] for SN, PT, and OT. Jana Cole , spoke with the patient, explained services and answered all questions. Demographics verified. Referral processed and arranged through central office. Patient has the following DME: none.  Sanjay Quarles, [facility] Liaison

## 2021-03-23 NOTE — PROGRESS NOTES
Problem: Mobility Impaired (Adult and Pediatric)  Goal: *Acute Goals and Plan of Care (Insert Text)  Description: Physical Therapy Goals  Initiated 3/22/2021 and to be accomplished within 7 day(s)  1. Patient will move from supine to sit and sit to supine  in bed with independence. 2.  Patient will transfer from bed to chair and chair to bed with independence. 3.  Patient will perform sit to stand with independence. 4.  Patient will ambulate with independence for 200 feet with the least restrictive device. 5.  Patient will ascend/descend 4 stairs with 2 handrail(s) with minimal assistance/contact guard assist.    PLOF: pt ambulates without an assistive device, lives in a single story home with 4 steps to enter,  and son can assist as needed     Outcome: Progressing Towards Goal     PHYSICAL THERAPY TREATMENT    Patient: Oswaldo Rodriguez (82 y.o. female)  Date: 3/23/2021  Diagnosis: Sepsis ([address] [A41.9]  CARA (acute kidney injury) ([address] [N17.9] Sepsis ([address]       Precautions: Fall  PLOF: see above    ASSESSMENT:  Pt received in bed in [facility], agreeable to PT tx session after finishing her breathing treatment. Pt reports she has been up ad sarah in her room and is getting around like she does at home. Pt is able to complete longer gait trial this date of approx 150 ft x 1 with no AD or LOB noted. Pt amb at decreased speed and needs multiple standing rest breaks for recovery throughout however she states this is her baseline. Pt's O2 is noted to be 98% on RA with activity. When returned to room, sitting EOB after gait, pt has mild SOB and is instructed in PLB to help her recover in which she does in 1-2 min. Pt left sitting EOB with all needs met at end of session and nursing notified of progress this date. Recommend HH vs outpatient PT upon discharge if pt desires to address generalized deconditioning.      Progression toward goals:   [x]      Improving appropriately and progressing toward goals  [] Improving slowly and progressing toward goals  []      Not making progress toward goals and plan of care will be adjusted     PLAN:  Patient continues to benefit from skilled intervention to address the above impairments. Continue treatment per established plan of care. Discharge Recommendations:  Home Health  Further Equipment Recommendations for Discharge:  N/A     SUBJECTIVE:   Patient stated I want to go home today, I hope they let me.     OBJECTIVE DATA SUMMARY:   Critical Behavior:  Neurologic State: Alert  Orientation Level: Oriented X4  Cognition: Appropriate decision making, Follows commands  Safety/Judgement: Fall prevention  Functional Mobility Training:  Bed Mobility:     Supine to Sit: Modified independent  Sit to Supine: Modified independent  Scooting: Modified independent         Transfers:  Sit to Stand: Supervision  Stand to Sit: Supervision       Balance:  Sitting: Intact  Standing: Impaired; With support  Standing - Static: Good  Standing - Dynamic : Fair        Ambulation/Gait Training:  Distance (ft): 150 Feet (ft)  Assistive Device: (none )  Ambulation - Level of Assistance: Supervision        Gait Abnormalities: Decreased step clearance              Speed/Venus: Pace decreased (<100 feet/min)  Step Length: Left shortened;Right shortened        Pain:  Pain level pre-treatment: does not report /10  Pain level post-treatment: \"   \" /10       Activity Tolerance:   Good    Please refer to the flowsheet for vital signs taken during this treatment. After treatment:   [] Patient left in no apparent distress sitting up in chair  [x] Patient left in no apparent distress in bed  [] Call bell left within reach  [x] Nursing notified  [] Caregiver present  [] Bed alarm activated  [] SCDs applied      COMMUNICATION/EDUCATION:   [x]         Role of Physical Therapy in the acute care setting. [x]         Fall prevention education was provided and the patient/caregiver indicated understanding.   [x] Patient/family have participated as able in working toward goals and plan of care. [x]         Patient/family agree to work toward stated goals and plan of care. []         Patient understands intent and goals of therapy, but is neutral about his/her participation.   []         Patient is unable to participate in stated goals/plan of care: ongoing with therapy staff.  []         Other:        Nancy Chamorro   Time Calculation: 23 mins

## 2021-03-23 NOTE — PROGRESS NOTES
Problem: Falls - Risk of  Goal: *Absence of Falls  Description: Document Barnhart Reason Fall Risk and appropriate interventions in the flowsheet. Outcome: Progressing Towards Goal  Note: Fall Risk Interventions:  Mobility Interventions: Assess mobility with egress test         Medication Interventions: Assess postural VS orthostatic hypotension    Elimination Interventions: Bed/chair exit alarm    History of Falls Interventions: Bed/chair exit alarm         Problem: Patient Education: Go to Patient Education Activity  Goal: Patient/Family Education  Outcome: Progressing Towards Goal     Problem: Diabetes Self-Management  Goal: *Disease process and treatment process  Description: Define diabetes and identify own type of diabetes; list 3 options for treating diabetes. Outcome: Progressing Towards Goal  Goal: *Incorporating nutritional management into lifestyle  Description: Describe effect of type, amount and timing of food on blood glucose; list 3 methods for planning meals. Outcome: Progressing Towards Goal  Goal: *Incorporating physical activity into lifestyle  Description: State effect of exercise on blood glucose levels. Outcome: Progressing Towards Goal  Goal: *Developing strategies to promote health/change behavior  Description: Define the ABC's of diabetes; identify appropriate screenings, schedule and personal plan for screenings. Outcome: Progressing Towards Goal  Goal: *Using medications safely  Description: State effect of diabetes medications on diabetes; name diabetes medication taking, action and side effects. Outcome: Progressing Towards Goal  Goal: *Monitoring blood glucose, interpreting and using results  Description: Identify recommended blood glucose targets  and personal targets.   Outcome: Progressing Towards Goal  Goal: *Prevention, detection, treatment of acute complications  Description: List symptoms of hyper- and hypoglycemia; describe how to treat low blood sugar and actions for lowering high blood glucose level. Outcome: Progressing Towards Goal  Goal: *Prevention, detection and treatment of chronic complications  Description: Define the natural course of diabetes and describe the relationship of blood glucose levels to long term complications of diabetes. Outcome: Progressing Towards Goal  Goal: *Developing strategies to address psychosocial issues  Description: Describe feelings about living with diabetes; identify support needed and support network  Outcome: Progressing Towards Goal  Goal: *Insulin pump training  Outcome: Progressing Towards Goal  Goal: *Sick day guidelines  Outcome: Progressing Towards Goal  Goal: *Patient Specific Goal (EDIT GOAL, INSERT TEXT)  Outcome: Progressing Towards Goal     Problem: Patient Education: Go to Patient Education Activity  Goal: Patient/Family Education  Outcome: Progressing Towards Goal     Problem: Pressure Injury - Risk of  Goal: *Prevention of pressure injury  Description: Document Vladislav Scale and appropriate interventions in the flowsheet.   Outcome: Progressing Towards Goal  Note: Pressure Injury Interventions:       Moisture Interventions: Absorbent underpads    Activity Interventions: Pressure redistribution bed/mattress(bed type)    Mobility Interventions: Pressure redistribution bed/mattress (bed type)    Nutrition Interventions: Document food/fluid/supplement intake    Friction and Shear Interventions: Apply protective barrier, creams and emollients                Problem: Patient Education: Go to Patient Education Activity  Goal: Patient/Family Education  Outcome: Progressing Towards Goal     Problem: Patient Education: Go to Patient Education Activity  Goal: Patient/Family Education  Outcome: Progressing Towards Goal     Problem: Sepsis: Day of Diagnosis  Goal: Off Pathway (Use only if patient is Off Pathway)  Outcome: Progressing Towards Goal  Goal: *Fluid resuscitation  Outcome: Progressing Towards Goal  Goal: *Paired blood cultures prior to first dose of antibiotic  Outcome: Progressing Towards Goal  Goal: *First dose of  appropriate antibiotic within 3 hours of arrival to ED, within 1 hour of arrival to ICU  Outcome: Progressing Towards Goal  Goal: *Lactic acid with first set of blood cultures  Outcome: Progressing Towards Goal  Goal: *Pneumococcal immunization (if eligible)  Outcome: Progressing Towards Goal  Goal: *Influenza immunization (if eligible)  Outcome: Progressing Towards Goal  Goal: Activity/Safety  Outcome: Progressing Towards Goal  Goal: Consults, if ordered  Outcome: Progressing Towards Goal  Goal: Diagnostic Test/Procedures  Outcome: Progressing Towards Goal  Goal: Nutrition/Diet  Outcome: Progressing Towards Goal  Goal: Discharge Planning  Outcome: Progressing Towards Goal  Goal: Medications  Outcome: Progressing Towards Goal  Goal: Respiratory  Outcome: Progressing Towards Goal  Goal: Treatments/Interventions/Procedures  Outcome: Progressing Towards Goal  Goal: Psychosocial  Outcome: Progressing Towards Goal     Problem: Sepsis: Day 2  Goal: Off Pathway (Use only if patient is Off Pathway)  Outcome: Progressing Towards Goal  Goal: *Central Venous Pressure maintained at 8-12 mm Hg  Outcome: Progressing Towards Goal  Goal: *Hemodynamically stable  Outcome: Progressing Towards Goal  Goal: *Tolerating diet  Outcome: Progressing Towards Goal  Goal: Activity/Safety  Outcome: Progressing Towards Goal  Goal: Consults, if ordered  Outcome: Progressing Towards Goal  Goal: Diagnostic Test/Procedures  Outcome: Progressing Towards Goal  Goal: Nutrition/Diet  Outcome: Progressing Towards Goal  Goal: Discharge Planning  Outcome: Progressing Towards Goal  Goal: Medications  Outcome: Progressing Towards Goal  Goal: Respiratory  Outcome: Progressing Towards Goal  Goal: Treatments/Interventions/Procedures  Outcome: Progressing Towards Goal  Goal: Psychosocial  Outcome: Progressing Towards Goal     Problem: Sepsis: Day 3  Goal: Off Pathway (Use only if patient is Off Pathway)  Outcome: Progressing Towards Goal  Goal: *Central Venous Pressure maintained at 8-12 mm Hg  Outcome: Progressing Towards Goal  Goal: *Oxygen saturation within defined limits  Outcome: Progressing Towards Goal  Goal: *Vital sign stability  Outcome: Progressing Towards Goal  Goal: *Tolerating diet  Outcome: Progressing Towards Goal  Goal: *Demonstrates progressive activity  Outcome: Progressing Towards Goal  Goal: Activity/Safety  Outcome: Progressing Towards Goal  Goal: Consults, if ordered  Outcome: Progressing Towards Goal  Goal: Diagnostic Test/Procedures  Outcome: Progressing Towards Goal  Goal: Nutrition/Diet  Outcome: Progressing Towards Goal  Goal: Discharge Planning  Outcome: Progressing Towards Goal  Goal: Medications  Outcome: Progressing Towards Goal  Goal: Respiratory  Outcome: Progressing Towards Goal  Goal: Treatments/Interventions/Procedures  Outcome: Progressing Towards Goal  Goal: Psychosocial  Outcome: Progressing Towards Goal     Problem: Sepsis: Day 4  Goal: Off Pathway (Use only if patient is Off Pathway)  Outcome: Progressing Towards Goal  Goal: Activity/Safety  Outcome: Progressing Towards Goal  Goal: Consults, if ordered  Outcome: Progressing Towards Goal  Goal: Diagnostic Test/Procedures  Outcome: Progressing Towards Goal  Goal: Nutrition/Diet  Outcome: Progressing Towards Goal  Goal: Discharge Planning  Outcome: Progressing Towards Goal  Goal: Medications  Outcome: Progressing Towards Goal  Goal: Respiratory  Outcome: Progressing Towards Goal  Goal: Treatments/Interventions/Procedures  Outcome: Progressing Towards Goal  Goal: Psychosocial  Outcome: Progressing Towards Goal  Goal: *Oxygen saturation within defined limits  Outcome: Progressing Towards Goal  Goal: *Hemodynamically stable  Outcome: Progressing Towards Goal  Goal: *Vital signs within defined limits  Outcome: Progressing Towards Goal  Goal: *Tolerating diet  Outcome: Progressing Towards Goal  Goal: *Demonstrates progressive activity  Outcome: Progressing Towards Goal  Goal: *Fluid volume maintenance  Outcome: Progressing Towards Goal     Problem: Sepsis: Day 5  Goal: Off Pathway (Use only if patient is Off Pathway)  Outcome: Progressing Towards Goal  Goal: *Oxygen saturation within defined limits  Outcome: Progressing Towards Goal  Goal: *Vital signs within defined limits  Outcome: Progressing Towards Goal  Goal: *Tolerating diet  Outcome: Progressing Towards Goal  Goal: *Demonstrates progressive activity  Outcome: Progressing Towards Goal  Goal: *Discharge plan identified  Outcome: Progressing Towards Goal  Goal: Activity/Safety  Outcome: Progressing Towards Goal  Goal: Consults, if ordered  Outcome: Progressing Towards Goal  Goal: Diagnostic Test/Procedures  Outcome: Progressing Towards Goal  Goal: Nutrition/Diet  Outcome: Progressing Towards Goal  Goal: Discharge Planning  Outcome: Progressing Towards Goal  Goal: Medications  Outcome: Progressing Towards Goal  Goal: Respiratory  Outcome: Progressing Towards Goal  Goal: Treatments/Interventions/Procedures  Outcome: Progressing Towards Goal  Goal: Psychosocial  Outcome: Progressing Towards Goal     Problem: Sepsis: Day 6  Goal: Off Pathway (Use only if patient is Off Pathway)  Outcome: Progressing Towards Goal  Goal: *Oxygen saturation within defined limits  Outcome: Progressing Towards Goal  Goal: *Vital signs within defined limits  Outcome: Progressing Towards Goal  Goal: *Tolerating diet  Outcome: Progressing Towards Goal  Goal: *Demonstrates progressive activity  Outcome: Progressing Towards Goal  Goal: Influenza immunization  Outcome: Progressing Towards Goal  Goal: *Pneumococcal immunization  Outcome: Progressing Towards Goal  Goal: Activity/Safety  Outcome: Progressing Towards Goal  Goal: Diagnostic Test/Procedures  Outcome: Progressing Towards Goal  Goal: Nutrition/Diet  Outcome: Progressing Towards Goal  Goal: Discharge Planning  Outcome: Progressing Towards Goal  Goal: Medications  Outcome: Progressing Towards Goal  Goal: Respiratory  Outcome: Progressing Towards Goal  Goal: Treatments/Interventions/Procedures  Outcome: Progressing Towards Goal  Goal: Psychosocial  Outcome: Progressing Towards Goal     Problem: Sepsis: Discharge Outcomes  Goal: *Vital signs within defined limits  Outcome: Progressing Towards Goal  Goal: *Tolerating diet  Outcome: Progressing Towards Goal  Goal: *Verbalizes understanding and describes prescribed diet  Outcome: Progressing Towards Goal  Goal: *Demonstrates progressive activity  Outcome: Progressing Towards Goal  Goal: *Describes follow-up/return visits to physicians  Outcome: Progressing Towards Goal  Goal: *Verbalizes name, dosage, time, side effects, and number of days to continue medications  Outcome: Progressing Towards Goal  Goal: *Influenza immunization (Oct-Mar only)  Outcome: Progressing Towards Goal  Goal: *Pneumococcal immunization  Outcome: Progressing Towards Goal  Goal: *Lungs clear or at baseline  Outcome: Progressing Towards Goal  Goal: *Oxygen saturation returns to baseline or 90% or better on room air  Outcome: Progressing Towards Goal  Goal: *Glycemic control  Outcome: Progressing Towards Goal  Goal: *Absence of deep venous thrombosis signs and symptoms(Stroke Metric)  Outcome: Progressing Towards Goal  Goal: *Describes available resources and support systems  Outcome: Progressing Towards Goal  Goal: *Optimal pain control at patient's stated goal  Outcome: Progressing Towards Goal

## 2021-03-23 NOTE — PROGRESS NOTES
Infectious Disease progress Note        Reason: Septic shock    Current abx Prior abx   Augmentin, doxycycline since 3/20 Pip/tazo, vancomycin 3/18-3/20     Lines:       Assessment :  78 y.o. female with past medical history of CAD s/p 2 MI in the past s/p stent in 2020/diabetes mellitus type 2 uncontrolled high blood glucose/morbid obesity/hypertension/COPD/CKD 3  Admitted to [facility] on 3/17/21  with history of increasing pruritus generalized along with likely developing high fever, severe oropharyngeal pain burning pain. Now with gram-positive bacteremia, hypotension requiring pressors, worsening renal function    Patient presents with a highly complex clinical picture. Difficulty determine exact etiology of patient's presentation at this time. differential diagnosis includes severe sepsis secondary to bacterial pharyngitis due to pathogens such as arcanobacterium hemolyticum complicated by hypotension/CARA due to hypovolemia. Coagulase-negative Staphylococcus in blood culture 3/17 likely contaminant. Negative repeat blood cultures. No evidence of skin and soft tissue infection as the source of coagulase-negative Staphylococcus bacteremia. Both sets were done 1 minute apart and hence still could represent contamination    Acute on chronic kidney disease-nephrology follow-up appreciated    Rash-likely due to bacterial pharyngitis- improved    Persistent fever 3/20- likely due to missed abx due to lack of iv access. Multiple attempts at IV access had failed. Started on po abx 3/20    Diarrhea- likely antibiotic associated. negative c.diff test 3/21    Increased periorbital swelling noted today- likely due to volume overload. No clinical evidence of allergic reaction. Resolved throat pain, rash. Patient wishes to go home. Rec:  -Continue po augmentin, doxycycline till 3/30/2021  -Discharge planning per primary team     Above plan was discussed in details with patient, RN, .  Please call me if any further questions or concerns. Will continue to participate in the care of this patient. HPI:  Feels better. Improved diarrhea. Resolved sore throat. No further itching. Improved rash. Denies any increasing shortness of breath, cough, abdominal pain. home Medication List    Details   atorvastatin (LIPITOR) 80 mg tablet Take 1 Tab by mouth daily. Qty: 90 Tab, Refills: 3    Associated Diagnoses: Dyslipidemia, goal LDL below 70      carvediloL (COREG) 25 mg tablet Take 1 Tab by mouth two (2) times daily (with meals). Qty: 180 Tab, Refills: 3    Associated Diagnoses: Essential hypertension; Chronic systolic congestive heart failure (HCC)      sacubitriL-valsartan (Entresto)  mg tablet Take 1 Tab by mouth two (2) times a day. Qty: 180 Tab, Refills: 2    Associated Diagnoses: Chronic systolic congestive heart failure (HCC)      furosemide (LASIX) 40 mg tablet Take 1 Tab by mouth two (2) times daily as needed (edema). Qty: 180 Tab, Refills: 1    Associated Diagnoses: Chronic systolic congestive heart failure (HCC)      tiZANidine (ZANAFLEX) 4 mg tablet Take 4 mg by mouth two (2) times a day. lidocaine (Lidoderm) 5 % Apply patch to the affected area for 12 hours a day and remove for 12 hours a day. Qty: 1 Package, Refills: 0      pantoprazole (PROTONIX) 40 mg tablet Take 1 Tab by mouth daily. Qty: 30 Tab, Refills: 0      albuterol (PROVENTIL HFA, VENTOLIN HFA, PROAIR HFA) 90 mcg/actuation inhaler Take 2 Puffs by inhalation every four (4) hours as needed for Wheezing. Qty: 1 Inhaler, Refills: 0      aspirin 81 mg chewable tablet Take 2 Tabs by mouth daily.   Qty: 60 Tab, Refills: 0    Associated Diagnoses: Coronary artery disease involving native coronary artery of native heart without angina pectoris      insulin detemir (LEVEMIR) 100 unit/mL injection 15 units daily for diabetes  Qty: 2 Vial, Refills: 0    Associated Diagnoses: Diabetes mellitus type 2, insulin dependent ([address] desloratadine (CLARINEX) 5 mg tablet Take 1 Tab by mouth daily as needed for Allergies. Qty: 30 Tab, Refills: 0    Associated Diagnoses: Environmental allergies      mometasone (NASONEX) 50 mcg/actuation nasal spray 2 Sprays by Both Nostrils route daily as needed. For allergies  Qty: 3 Container, Refills: 3    Associated Diagnoses: Environmental allergies      umeclidinium-vilanterol (ANORO ELLIPTA) 62.5-25 mcg/actuation inhaler Take 1 Puff by inhalation daily. For COPD  Qty: 3 Inhaler, Refills: 3    Comments: 9/22/15 Dr Black Martino prescribed.  Will order via [facility] if available  Associated Diagnoses: Chronic obstructive pulmonary disease, unspecified COPD type ([facility]             Current Facility-Administered Medications   Medication Dose Route Frequency    calcitRIOL (ROCALTROL) capsule 0.25 mcg  0.25 mcg Oral DAILY    amoxicillin-clavulanate (AUGMENTIN) 875-125 mg per tablet 1 Tab  1 Tab Oral BID WITH MEALS    cyanocobalamin tablet 1,000 mcg  1,000 mcg Oral DAILY    L. acidophilus,casei,rhamnosus (BIO-K PLUS) capsule 1 Cap  1 Cap Oral DAILY    aspirin chewable tablet 162 mg  162 mg Oral DAILY    atorvastatin (LIPITOR) tablet 80 mg  80 mg Oral DAILY    carvediloL (COREG) tablet 25 mg  25 mg Oral BID WITH MEALS    sodium chloride (NS) flush 5-40 mL  5-40 mL IntraVENous Q8H    sodium chloride (NS) flush 5-40 mL  5-40 mL IntraVENous PRN    acetaminophen (TYLENOL) tablet 650 mg  650 mg Oral Q6H PRN    Or    acetaminophen (TYLENOL) suppository 650 mg  650 mg Rectal Q6H PRN    polyethylene glycol (MIRALAX) packet 17 g  17 g Oral DAILY PRN    promethazine (PHENERGAN) tablet 12.5 mg  12.5 mg Oral Q6H PRN    Or    ondansetron (ZOFRAN) injection 4 mg  4 mg IntraVENous Q6H PRN    albuterol-ipratropium (DUO-NEB) 2.5 MG-0.5 MG/3 ML  3 mL Nebulization Q6H RT    budesonide (PULMICORT) 500 mcg/2 ml nebulizer suspension  500 mcg Nebulization BID RT    doxycycline (VIBRAMYCIN) capsule 100 mg  100 mg Oral Q12H    arformoteroL (BROVANA) neb solution 15 mcg  15 mcg Nebulization BID RT    melatonin (rapid dissolve) tablet 5 mg  5 mg Oral QHS    heparin (porcine) injection 5,000 Units  5,000 Units SubCUTAneous Q8H    albuterol-ipratropium (DUO-NEB) 2.5 MG-0.5 MG/3 ML  3 mL Nebulization Q6H PRN    insulin glargine (LANTUS) injection 10 Units  10 Units SubCUTAneous QHS    sodium chloride (NS) flush 5-10 mL  5-10 mL IntraVENous PRN    acetaminophen (TYLENOL) tablet 650 mg  650 mg Oral Q6H PRN    Or    acetaminophen (TYLENOL) suppository 650 mg  650 mg Rectal Q6H PRN    insulin lispro (HUMALOG) injection   SubCUTAneous AC&HS    glucose chewable tablet 16 g  4 Tab Oral PRN    glucagon (GLUCAGEN) injection 1 mg  1 mg IntraMUSCular PRN    dextrose (D50W) injection syrg 12.5-25 g  25-50 mL IntraVENous PRN    lidocaine (XYLOCAINE) 2 % viscous solution 15 mL  15 mL Mouth/Throat PRN    diphenhydrAMINE (BENADRYL) capsule 25 mg  25 mg Oral Q6H PRN    nystatin (MYCOSTATIN) 100,000 unit/mL oral suspension 500,000 Units  500,000 Units Oral QID    nystatin (MYCOSTATIN) 100,000 unit/gram powder   Topical BID       Allergies: Patient has no known allergies. Temp (24hrs), Av.3 °F (36.8 °C), Min:97.7 °F (36.5 °C), Max:99.1 °F (37.3 °C)    Visit Vitals  /69 (BP 1 Location: Right upper arm, BP Patient Position: At rest)   Pulse 74   Temp 98.2 °F (36.8 °C)   Resp 18   Ht 5' 4.02\" (1.626 m) Comment: Nephrology office visit   Wt 110 kg (242 lb 9.6 oz)   SpO2 97%   BMI 41.62 kg/m²       ROS: 12 point ROS obtained in details.  Pertinent positives as mentioned in HPI,   otherwise negative    Physical Exam:    General appearance - alert, well appearing, and in no distress, oriented to person, place, and time and overweight  Mental status - alert, oriented to person, place, and time  Eyes - pupils equal and reactive, extraocular eye movements intact  Ears - bilateral TM's and external ear canals normal  Nose - normal and patent, no erythema, discharge or polyps  Mouth - mucous membranes moist, pharynx normal without lesions  Neck - supple, no significant adenopathy  Chest - clear to auscultation, no wheezes, rales or rhonchi, symmetric air entry  Heart - normal rate, regular rhythm, normal S1, S2, no rubs, clicks or gallops  Abdomen - soft, nontender, nondistended, no masses or organomegaly  Breasts -rash underneath both breast  Neurological - alert, oriented, normal speech, no focal findings or movement disorder noted  Musculoskeletal - no joint tenderness, deformity or swelling  Extremities - peripheral pulses normal, no pedal edema, no clubbing or cyanosis  Skin - rash on extremities, trunk almost completely resolved    Labs: Results:   Chemistry Recent Labs     03/23/21  0355 03/22/21  0354 03/21/21  1610 03/20/21  2121   * 104* 119*  --     141 143  --    K 4.1 4.0 4.1  --    * 116* 115*  --    CO2 19* 18* 20*  --    BUN 26* 31* 34*  --    CREA 1.33* 1.46* 1.61*  --    CA 7.7* 7.6* 7.8*  --    AGAP 8 7 8  --    BUCR 20 21* 21*  --    AP  --   --   --  116   TP  --   --   --  5.0*   ALB  --   --   --  2.6*   GLOB  --   --   --  2.4   AGRAT  --   --   --  1.1      CBC w/Diff Recent Labs     03/23/21  0355 03/22/21  0354 03/21/21  1610   WBC 12.4 11.9 10.2   RBC 3.73* 3.84* 3.80*   HGB 10.2* 10.2* 10.5*   HCT 32.5* 34.0* 33.0*    273 269   GRANS 36* 39* 45   LYMPH 22 30 23   EOS 35* 25* 21*      Microbiology Recent Labs     03/20/21  1421   CULT NO GROWTH 2 DAYS          RADIOLOGY:    All available imaging studies/reports in [facility] for this admission were reviewed      Disclaimer: Sections of this note are dictated utilizing voice recognition software, which may have resulted in some phonetic based errors in grammar and contents. Even though attempts were made to correct all the mistakes, some may have been missed, and remained in the body of the document.  If questions arise, please contact our department.     Dr. Chanell Tillman, Infectious Disease Specialist  [phone]  March 23, 2021  9:47 AM

## 2021-03-23 NOTE — ROUTINE PROCESS
Received verbal report from [address] [place] [address], report included SBAR, MAR, and Kardex. Gave verbal report to Zofia Saenz RN, report included SBAR, MAR, and Kardex.

## 2021-03-23 NOTE — PROGRESS NOTES
Home health orders noted. Spoke with pt and she was agreeable. Home health orders sent to [address] was notified. Discharge order noted for today. Pt has been accepted to [facility] agency. Spoke with patient  and are agreeable to the transition plan today. Transport has been arranged through pt's . Patient's discharge summary and home health  orders have been forwarded to [facility]  agency via . Updated bedside RN, ,  to the transition plan.   Discharge information has been documented on the AVS.           MARTA Garcia RN  Care Management  Pager: [phone]

## 2021-03-23 NOTE — DISCHARGE SUMMARY
Discharge Summary    Patient: Ezekiel Ortiz MRN: [id number]  CSN: [id number]    YOB: 1950  Age: 78 y.o. Sex: female    DOA: 3/17/2021 LOS:  LOS: 5 days        Disposition: Home with Stephon Weir    Discharge Date: 3/23/2021    Admission Diagnosis: Sepsis ([facility] [A41.9]  CARA (acute kidney injury) ([facility] [N17.9]    Discharge Diagnosis:   1. Bacterial pharyngitis  2. Severe sepsis due to #1   3. Hypotension requiring pressors at presentation, resolved. 4. Coagulase-negative Staphylococcus in blood culture 3/17 likely contaminant, repeat culture negative. 5. Covid biofire negative  6. Diffuse maculopapular rash. Improved. 7. Cad s/p S/P 2 MI and s\STENT - cardiac cath 2019 revealed patent stent to proximal LAD. 8. Acute metabolic encephalopathy. 9. Diarrhea improved. 10. Obesity, morbid. 11. Mild - moderate COPD, former tobacco      Discharge Condition: Stable      PHYSICAL EXAM  Visit Vitals  /75 (BP 1 Location: Right upper arm, BP Patient Position: Sitting)   Pulse 76   Temp 97.3 °F (36.3 °C)   Resp 20   Ht 5' 4.02\" (1.626 m)   Wt 110 kg (242 lb 9.6 oz)   SpO2 96%   BMI 41.62 kg/m²       General: Alert, cooperative, no acute distress    HEENT: PERRLA, EOMI. Anicteric sclerae. Lungs:  CTA Bilaterally. No Wheezing/Rales. Heart:             Regular rate and Rhythm. Abdomen: Soft, Non distended, Non tender. + Bowel sounds. Extremities: No edema. Psych:   Good insight. Not anxious or agitated. Neurologic:  AA, oriented X 3. Moves all ext                                 Hospital Course:   Ezekiel Ortiz is a 78 y.o. female with past medical history of CAD s/p 2 MI in the past s/p stent in 2020/diabetes mellitus type 2 uncontrolled high blood glucose/morbid obesity/hypertension/COPD/CKD 3 4 being admitted with history of increasing pruritus generalized along with likely developing high fever, severe oropharyngeal pain burning pain.   In the emergency room patient was noted to be hypotensive, was started on IV pressors after blood pressure is not improved with IV fluid resuscitation. There was no clear source of infection other than having rash. CTA was done and also lumbar puncture was done in the ED. Prelim work-up was negative for meningitis. Patient was started on empiric antibiotics. Patient was admitted to ICU. Patient did not get a bed in the ICU so patient stayed in the ED for longer duration. With antibiotics and IV fluids patient's blood pressure slowly improved, patient was weaned off IV pressors. Once patient blood pressure stabilized, patient was downgraded to stepdown unit. Patient underwent COVID-19 testing which was negative. Patient was transferred to [facility] stepdown unit. Patient continued to have on and off fever, patient was continued on empiric antibiotics. ID and pulmonary was consulted. ID saw the patient and thought that patient most likely has bacterial pharyngitis, recommend continue antibiotics. Initial blood cultures grew coag negative staph so repeat cultures were obtained. Repeat cultures remain negative. ID thought the bacteremia is due to contamination. Patient's rash improved. Nephrology was consulted due to renal failure, nephrology recommended to continue IV fluids. Patient's renal function slowly improved with IV fluids, nephrology discontinued IV fluids. During the hospital course patient also developed some puffiness and nonpitting edema of her upper extremity and face. Patient underwent upper extremity duplex, negative for DVT. Patient feels lot better, rashes significantly improved. No fevers, no leukocytosis. ID and nephrology follow-up on the patient, recommended okay for discharge. Per nephrology, facial swelling is mostly due to mild fluid overload from IV fluids, recommend start Lasix and okay to discharge. Discussed with the patient about monitoring overnight with the diuretics, patient very adamant she wants to go home.   Explained to the patient about my above plan of care, patient understands and wants to go home. Explained to the patient to come back to the ED if she has worsening swelling, difficulty breathing, fevers. Discussed with patient hand her  over the phone and explained about my above plan of care, discharge planning, new medications and side effects of new medication. Both of them understood and agreed with the plan. Procedures: None     Consults:   Dr. Bebeto Christina, infectious disease  Dr. Bebeto Christina, ICU critical care  Dr. Bebeto Christina, nephrology    Imaging studies:   CT Results (most recent):  Results from Hospital Encounter encounter on 03/17/21   CT CHEST ABD PELV WO CONT    Narrative CT CHEST ABDOMEN AND PELVIS WITHOUT CONTRAST    COMPARISON: Plain films, prior CTA chest.    INDICATIONS: Sepsis, abnormal liver function tests, elevated BNP. TECHNIQUE: Noncontrast total CT images were obtained from the neck to the  perineum using volumetric data acquisition on a multislice scanner. Reconstructions were created in axial, coronal, and sagittal planes. Soft  tissue, lung, and bone algorithms were utilized. Gio Dahl FINDINGS:     CT CHEST FINDINGS:    Thyroid/Base Of Neck: Negative. Lungs:   A strand of subsegmental atelectasis or fibrosis is seen in the left base. Otherwise clear    Pleural Spaces:  No pneumothorax or pleural fluid. Chest Wall:   No breast mass or axillary adenopathy. Mediastinum, Maria, Great Vessels, Heart:  No adenopathy or mass evident. Cardiac chamber enlargement is present. CT ABDOMEN FINDINGS:     Liver/Biliary: Negative. Spleen: Negative. Adrenal Glands: Normal.    Kidneys: Small right-sided renal cysts. Tiny nonobstructive intrarenal calculus  in the left lower pole. Otherwise negative. Pancreas: Unremarkable. Stomach, Small Bowel And Colon: Normal stomach and small bowel. Normal appendix. Diverticulosis without diverticulitis. Peritoneal lymphadenopathy is evident.     The abdominal aorta and IVC are unremarkable. Peritoneal Spaces: No free fluid or free air. Abdominal Wall: Unremarkable. Bladder: Collapsed about an indwelling Frye catheter. Myomatous uterus    Osseous Structures Of The Chest, Abdomen And Pelvis: No acute or aggressive  abnormalities evident. .      Impression No occult septic focus is identified. .  Abnormalities are identified in the chest abdomen or pelvis          All CT scans at this facility are performed using dose optimization technique as  appropriate to the performed exam, to include automated exposure control,  adjustment of the mA and/or kV according to patient's size (Including  appropriate matching for site-specific examinations), or use of iterative  reconstruction technique. Discharge Medications:     Current Discharge Medication List      START taking these medications    Details   amoxicillin-clavulanate (AUGMENTIN) 875-125 mg per tablet Take 1 Tab by mouth two (2) times daily (with meals) for 7 days. Qty: 14 Tab, Refills: 0      calcitRIOL (ROCALTROL) 0.25 mcg capsule Take 1 Cap by mouth daily. Qty: 30 Cap, Refills: 0      cyanocobalamin 1,000 mcg tablet Take 1 Tab by mouth daily. Qty: 30 Tab, Refills: 0      L. acidophilus,casei,rhamnosus (BIO-K PLUS) 50 billion cell cpDR capsule Take 1 Cap by mouth daily for 7 days. Qty: 7 Cap, Refills: 0      albuterol-ipratropium (DUO-NEB) 2.5 mg-0.5 mg/3 ml nebu 3 mL by Nebulization route every six (6) hours as needed for Wheezing. Qty: 30 Nebule, Refills: 0      doxycycline (VIBRAMYCIN) 100 mg capsule Take 1 Cap by mouth every twelve (12) hours for 7 days. Qty: 14 Cap, Refills: 0      OTHER BMP in 1 week  Dx: ARF  Fax results to Dr. Petit Staff, Nephrology and PCP Dr. Rolf Dangelo. Qty: 1 Each, Refills: 0         CONTINUE these medications which have CHANGED    Details   furosemide (LASIX) 40 mg tablet Take 1 Tab by mouth daily.   Qty: 30 Tab, Refills: 0         CONTINUE these medications which have NOT CHANGED    Details   atorvastatin (LIPITOR) 80 mg tablet Take 1 Tab by mouth daily. Qty: 90 Tab, Refills: 3    Associated Diagnoses: Dyslipidemia, goal LDL below 70      carvediloL (COREG) 25 mg tablet Take 1 Tab by mouth two (2) times daily (with meals). Qty: 180 Tab, Refills: 3    Associated Diagnoses: Essential hypertension; Chronic systolic congestive heart failure (HCC)      tiZANidine (ZANAFLEX) 4 mg tablet Take 4 mg by mouth two (2) times a day. lidocaine (Lidoderm) 5 % Apply patch to the affected area for 12 hours a day and remove for 12 hours a day. Qty: 1 Package, Refills: 0      pantoprazole (PROTONIX) 40 mg tablet Take 1 Tab by mouth daily. Qty: 30 Tab, Refills: 0      albuterol (PROVENTIL HFA, VENTOLIN HFA, PROAIR HFA) 90 mcg/actuation inhaler Take 2 Puffs by inhalation every four (4) hours as needed for Wheezing. Qty: 1 Inhaler, Refills: 0      aspirin 81 mg chewable tablet Take 2 Tabs by mouth daily. Qty: 60 Tab, Refills: 0    Associated Diagnoses: Coronary artery disease involving native coronary artery of native heart without angina pectoris      insulin detemir (LEVEMIR) 100 unit/mL injection 15 units daily for diabetes  Qty: 2 Vial, Refills: 0    Associated Diagnoses: Diabetes mellitus type 2, insulin dependent (HCC)      desloratadine (CLARINEX) 5 mg tablet Take 1 Tab by mouth daily as needed for Allergies. Qty: 30 Tab, Refills: 0    Associated Diagnoses: Environmental allergies      mometasone (NASONEX) 50 mcg/actuation nasal spray 2 Sprays by Both Nostrils route daily as needed. For allergies  Qty: 3 Container, Refills: 3    Associated Diagnoses: Environmental allergies      umeclidinium-vilanterol (ANORO ELLIPTA) 62.5-25 mcg/actuation inhaler Take 1 Puff by inhalation daily. For COPD  Qty: 3 Inhaler, Refills: 3    Comments: 9/22/15 Dr Nithya Berman prescribed.  Will order via [facility] if available  Associated Diagnoses: Chronic obstructive pulmonary disease, unspecified COPD type ([facility]         STOP taking these medications       sacubitriL-valsartan (Entresto)  mg tablet Comments:   Reason for Stopping:             · It is important that you take the medication exactly as they are prescribed. · Keep your medication in the bottles provided by the pharmacist and keep a list of the medication names, dosages, and times to be taken in your wallet. · Do not take other medications without consulting your doctor. DIET:  Renal and DM Diet    ACTIVITY: Activity as tolerated  Home health care for Skilled care for CHF tele health, DM, Hypertension and medication management    ·    PT/OT consult      ADDITIONAL INFORMATION: If you experience any of the following symptoms but not limited to Fever, chills, nausea, vomiting, diarrhea, change in mentation, falling, bleeding, shortness of breath, chest pain, please call your primary care physician or return to the emergency room if you cannot get hold of your doctor:     FOLLOW UP CARE:  Dr. Lonnell Kussmaul, Marge Finch MD in 5-7 days. Please call and set up an appointment. Dr. Nan Mcgrath, Nephrology in 2 week    Minutes spent on discharge: 40 minutes spent coordinating this discharge (review instructions/follow-up, prescriptions, preparing report for sign off)    Dariana Briscoe MD  3/23/2021 1:11 PM    Disclaimer: Sections of this note are dictated using utilizing voice recognition software. Minor typographical errors may be present. If questions arise, please do not hesitate to contact me or call our department.

## 2021-03-23 NOTE — PROGRESS NOTES
Patient has follow up with Pcp Dr. Desire Davison on 4/14/2021 at 10:00 am, patient placed on a cancellation list for a sooner appointment.

## 2021-03-23 NOTE — DISCHARGE INSTRUCTIONS
Discharge Instructions    Patient: July He MRN: [id number]  CSN: [id number]    YOB: 1950  Age: 78 y.o. Sex: female    DOA: 3/17/2021 LOS:  LOS: 5 days   Discharge Date:      DIET:  Renal and DM Diet    ACTIVITY: Activity as tolerated  Home health care for Skilled care for CHF tele health, DM, Hypertension and medication management    ·    PT/OT consult      ADDITIONAL INFORMATION: If you experience any of the following symptoms but not limited to Fever, chills, nausea, vomiting, diarrhea, change in mentation, falling, bleeding, shortness of breath, chest pain, please call your primary care physician or return to the emergency room if you cannot get hold of your doctor:     FOLLOW UP CARE:  Dr. Luciana Alonzo, Karis Lamar MD in 5-7 days. Please call and set up an appointment.   Dr. Grant Wilkinson, Nephrology in 2 week      Jose Pollard MD  3/23/2021 1:10 PM

## 2021-03-24 ENCOUNTER — HOME CARE VISIT (OUTPATIENT)
Dept: HOME HEALTH SERVICES | Facility: HOME HEALTH | Age: 71
End: 2021-03-24

## 2021-03-24 ENCOUNTER — PATIENT OUTREACH (OUTPATIENT)
Dept: CASE MANAGEMENT | Age: 71
End: 2021-03-24

## 2021-03-24 NOTE — PROGRESS NOTES
Transitions of Care     Care Transitions Nurse ( CTN) attempted to contact patient via telephone call regarding hospital discharge of 3/23/21. A female answered and stated that patient is not available. This female inquired about nature of call. CTN indicated this was a call from [facility]. This was a courtesy call and CTN would call back at another time or patient can return call to CTN. CTN contact information provided for follow up as needed.

## 2021-03-25 ENCOUNTER — HOME CARE VISIT (OUTPATIENT)
Dept: SCHEDULING | Facility: HOME HEALTH | Age: 71
End: 2021-03-25

## 2021-03-25 LAB
BACTERIA SPEC CULT: NORMAL
BACTERIA SPEC CULT: NORMAL
GRAM STN SPEC: NORMAL
GRAM STN SPEC: NORMAL
SERVICE CMNT-IMP: NORMAL

## 2021-03-26 ENCOUNTER — HOME CARE VISIT (OUTPATIENT)
Dept: HOME HEALTH SERVICES | Facility: HOME HEALTH | Age: 71
End: 2021-03-26

## 2021-03-26 LAB
BACTERIA SPEC CULT: NORMAL
SERVICE CMNT-IMP: NORMAL

## 2021-03-27 ENCOUNTER — HOME CARE VISIT (OUTPATIENT)
Dept: HOME HEALTH SERVICES | Facility: HOME HEALTH | Age: 71
End: 2021-03-27

## 2021-03-28 ENCOUNTER — HOME CARE VISIT (OUTPATIENT)
Dept: HOME HEALTH SERVICES | Facility: HOME HEALTH | Age: 71
End: 2021-03-28

## 2021-03-29 ENCOUNTER — PATIENT OUTREACH (OUTPATIENT)
Dept: CASE MANAGEMENT | Age: 71
End: 2021-03-29

## 2021-03-29 NOTE — PROGRESS NOTES
Transitions of Care     Care Transitions Nurse ( CTN) attempted to contact patient via telephone call regarding follow up of  recent hospital discharge and Covid-19 educational information. There was no response. A voicemail message was left requesting a non-emergency return telephone call. CTN  contact information provided.

## 2021-04-01 ENCOUNTER — OFFICE VISIT (OUTPATIENT)
Dept: ORTHOPEDIC SURGERY | Age: 71
End: 2021-04-01
Payer: MEDICARE

## 2021-04-01 VITALS
HEIGHT: 64 IN | HEART RATE: 85 BPM | OXYGEN SATURATION: 97 % | BODY MASS INDEX: 39.09 KG/M2 | RESPIRATION RATE: 16 BRPM | WEIGHT: 229 LBS | TEMPERATURE: 96.9 F

## 2021-04-01 DIAGNOSIS — G89.29 CHRONIC PAIN OF LEFT KNEE: ICD-10-CM

## 2021-04-01 DIAGNOSIS — M25.561 CHRONIC PAIN OF RIGHT KNEE: ICD-10-CM

## 2021-04-01 DIAGNOSIS — M77.12 LATERAL EPICONDYLITIS OF LEFT ELBOW: ICD-10-CM

## 2021-04-01 DIAGNOSIS — M17.11 PRIMARY OSTEOARTHRITIS OF RIGHT KNEE: ICD-10-CM

## 2021-04-01 DIAGNOSIS — M25.522 LEFT ELBOW PAIN: ICD-10-CM

## 2021-04-01 DIAGNOSIS — G89.29 CHRONIC PAIN OF RIGHT KNEE: ICD-10-CM

## 2021-04-01 DIAGNOSIS — M25.562 CHRONIC PAIN OF LEFT KNEE: ICD-10-CM

## 2021-04-01 DIAGNOSIS — M17.12 PRIMARY OSTEOARTHRITIS OF LEFT KNEE: Primary | ICD-10-CM

## 2021-04-01 PROCEDURE — G9899 SCRN MAM PERF RSLTS DOC: HCPCS | Performed by: SPECIALIST

## 2021-04-01 PROCEDURE — G8756 NO BP MEASURE DOC: HCPCS | Performed by: SPECIALIST

## 2021-04-01 PROCEDURE — G8536 NO DOC ELDER MAL SCRN: HCPCS | Performed by: SPECIALIST

## 2021-04-01 PROCEDURE — 99213 OFFICE O/P EST LOW 20 MIN: CPT | Performed by: SPECIALIST

## 2021-04-01 PROCEDURE — G8399 PT W/DXA RESULTS DOCUMENT: HCPCS | Performed by: SPECIALIST

## 2021-04-01 PROCEDURE — G8417 CALC BMI ABV UP PARAM F/U: HCPCS | Performed by: SPECIALIST

## 2021-04-01 PROCEDURE — 1111F DSCHRG MED/CURRENT MED MERGE: CPT | Performed by: SPECIALIST

## 2021-04-01 PROCEDURE — 3288F FALL RISK ASSESSMENT DOCD: CPT | Performed by: SPECIALIST

## 2021-04-01 PROCEDURE — G8510 SCR DEP NEG, NO PLAN REQD: HCPCS | Performed by: SPECIALIST

## 2021-04-01 PROCEDURE — 20610 DRAIN/INJ JOINT/BURSA W/O US: CPT | Performed by: SPECIALIST

## 2021-04-01 PROCEDURE — 3017F COLORECTAL CA SCREEN DOC REV: CPT | Performed by: SPECIALIST

## 2021-04-01 PROCEDURE — 20605 DRAIN/INJ JOINT/BURSA W/O US: CPT | Performed by: SPECIALIST

## 2021-04-01 PROCEDURE — 1090F PRES/ABSN URINE INCON ASSESS: CPT | Performed by: SPECIALIST

## 2021-04-01 PROCEDURE — G8427 DOCREV CUR MEDS BY ELIG CLIN: HCPCS | Performed by: SPECIALIST

## 2021-04-01 PROCEDURE — 1100F PTFALLS ASSESS-DOCD GE2>/YR: CPT | Performed by: SPECIALIST

## 2021-04-01 RX ORDER — BETAMETHASONE SODIUM PHOSPHATE AND BETAMETHASONE ACETATE 3; 3 MG/ML; MG/ML
3 INJECTION, SUSPENSION INTRA-ARTICULAR; INTRALESIONAL; INTRAMUSCULAR; SOFT TISSUE ONCE
Status: COMPLETED | OUTPATIENT
Start: 2021-04-01 | End: 2021-04-01

## 2021-04-01 RX ADMIN — BETAMETHASONE SODIUM PHOSPHATE AND BETAMETHASONE ACETATE 3 MG: 3; 3 INJECTION, SUSPENSION INTRA-ARTICULAR; INTRALESIONAL; INTRAMUSCULAR; SOFT TISSUE at 16:15

## 2021-04-01 NOTE — PROGRESS NOTES
Patient: Alley Myrick                MRN: [id number]       SSN: [id number]  YOB: 1950        AGE: 78 y.o. SEX: female      PCP: Lindsey Perera MD  21    CC: LEFT ELBOW AND BILATERAL KNEE PAIN    HISTORY:  Alley Myrick is a 78 y.o. female who is seen for increased bilateral knee pain. She has been feeling increased knee pain since her fall on 20. She notes pain when bending to water her flowers with a watering can. Her knees hurt so much that she even has difficulty bending her knees to put on her pants. She completed a successful Synvisc series on 20. She is also seen for left elbow pain. She fell on 20 while she was walking through her house. She landed on her knee and struck her left elbow. She has had increased pain since. She has difficulty straightening out her elbow. She has been experiencing left elbow pain for the past 3 months. She has pain when lifting and gripping objects. Pain Assessment  2021   Location of Pain Knee   Location Modifiers Right;Left   Severity of Pain 10   Quality of Pain Aching   Quality of Pain Comment swelling   Duration of Pain Persistent   Frequency of Pain Constant   Aggravating Factors Walking;Standing   Aggravating Factors Comment -   Limiting Behavior Yes   Relieving Factors Elevation   Result of Injury -   Work-Related Injury -   Type of Injury -   Type of Injury Comment -     Occupation, etc:  Ms. Lester Hernandez receives [address] [place] [address] for COPD and knee arthritis. She moved from [place] to [place] with her  when her mother  several years ago. She moved from [place] to [place] 10 years ago. She and her  live in [place]. She has 3 sons--1 in [place] and 2 in [place] and 13 grandchildren. She has 2 Chihuahua-Poodle mix dogs--Frisky and Boots. Joaquin Livings and Boots are 1 yo sisters. Her weight fluctuates. She does not exercise regularly. She is an insulin-controlled diabetic.  She is 229 pounds and 5'4\" tall. Last 3 Recorded Weights in this Encounter    21 1548   Weight: 229 lb (103.9 kg)     Body mass index is 39.31 kg/m². REVIEW OF SYSTEMS: All Below are Negative except: See HPI     Constitutional: negative for fever, chills, and weight loss. Cardiovascular: negative for chest pain, claudication, leg swelling, SOB, PIERSON   Gastrointestinal: Negative for pain, N/V/C/D, Blood in stool or urine, dysuria,  hematuria, incontinence, pelvic pain. Musculoskeletal: See HPI   Neurological: Negative for dizziness and weakness. Negative for headaches, Visual changes, confusion, seizures   Phychiatric/Behavioral: Negative for depression, memory loss, substance  abuse. Extremities: Negative for hair changes, rash, or skin lesion changes. Hematologic: Negative for bleeding problems, bruising, pallor or swollen lymph  nodes   Peripheral Vascular: No calf pain, no circulation deficits.     Social History     Socioeconomic History    Marital status:      Spouse name: Not on file    Number of children: 3    Years of education: Not on file    Highest education level: Not on file   Occupational History    Occupation: retired   Social Needs    Financial resource strain: Not on file    Food insecurity     Worry: Not on file     Inability: Not on file   RABBL needs     Medical: Not on file     Non-medical: Not on file   Tobacco Use    Smoking status: Former Smoker     Packs/day: 0.25     Years: 48.00     Pack years: 12.00     Types: Cigarettes     Quit date: 2018     Years since quittin.8    Smokeless tobacco: Never Used   Substance and Sexual Activity    Alcohol use: No     Alcohol/week: 0.0 standard drinks    Drug use: No    Sexual activity: Yes     Partners: Male   Lifestyle    Physical activity     Days per week: Not on file     Minutes per session: Not on file    Stress: Not on file   Relationships    Social connections     Talks on phone: Not on file Gets together: Not on file     Attends Sabianist service: Not on file     Active member of club or organization: Not on file     Attends meetings of clubs or organizations: Not on file     Relationship status: Not on file    Intimate partner violence     Fear of current or ex partner: Not on file     Emotionally abused: Not on file     Physically abused: Not on file     Forced sexual activity: Not on file   Other Topics Concern     Service No    Blood Transfusions No    Caffeine Concern No    Occupational Exposure No    Hobby Hazards No    Sleep Concern No    Stress Concern No    Weight Concern No    Special Diet No    Back Care No    Exercise No    Bike Helmet No    Seat Belt Yes    Self-Exams Yes   Social History Narrative    Not on file        No Known Allergies     Current Outpatient Medications   Medication Sig    furosemide (LASIX) 40 mg tablet Take 1 Tab by mouth daily.  calcitRIOL (ROCALTROL) 0.25 mcg capsule Take 1 Cap by mouth daily.  cyanocobalamin 1,000 mcg tablet Take 1 Tab by mouth daily.  albuterol-ipratropium (DUO-NEB) 2.5 mg-0.5 mg/3 ml nebu 3 mL by Nebulization route every six (6) hours as needed for Wheezing.  OTHER BMP in 1 week  Dx: ARF  Fax results to Dr. Noe Garcia, Nephrology and PCP Dr. Glen Almeida.  atorvastatin (LIPITOR) 80 mg tablet Take 1 Tab by mouth daily.  carvediloL (COREG) 25 mg tablet Take 1 Tab by mouth two (2) times daily (with meals).  tiZANidine (ZANAFLEX) 4 mg tablet Take 4 mg by mouth two (2) times a day.  lidocaine (Lidoderm) 5 % Apply patch to the affected area for 12 hours a day and remove for 12 hours a day.  pantoprazole (PROTONIX) 40 mg tablet Take 1 Tab by mouth daily.  albuterol (PROVENTIL HFA, VENTOLIN HFA, PROAIR HFA) 90 mcg/actuation inhaler Take 2 Puffs by inhalation every four (4) hours as needed for Wheezing.  aspirin 81 mg chewable tablet Take 2 Tabs by mouth daily.     insulin detemir (LEVEMIR) 100 unit/mL injection 15 units daily for diabetes    desloratadine (CLARINEX) 5 mg tablet Take 1 Tab by mouth daily as needed for Allergies.  mometasone (NASONEX) 50 mcg/actuation nasal spray 2 Sprays by Both Nostrils route daily as needed. For allergies    umeclidinium-vilanterol (ANORO ELLIPTA) 62.5-25 mcg/actuation inhaler Take 1 Puff by inhalation daily. For COPD     No current facility-administered medications for this visit. PHYSICAL EXAMINATION:  Visit Vitals  Pulse 85   Temp 96.9 °F (36.1 °C) (Temporal)   Resp 16   Ht 5' 4\" (1.626 m)   Wt 229 lb (103.9 kg)   SpO2 97%   BMI 39.31 kg/m²      ORTHO EXAMINATION:  Examination Left knee Right knee   Skin Intact, childhood scars Intact, childhood scars   Range of motion 110-0 110-0   Effusion - +   Medial joint line tenderness + +   Lateral joint line tenderness - -   Popliteal tenderness - -   Osteophytes palpable + +   Mayurs - -   Patella crepitus + +   Anterior drawer - -   Lateral laxity - -   Medial laxity - -   Varus deformity - -   Valgus deformity - -   Pretibial edema 3+ 3+   Calf tenderness - -   Pain over L tibial tubercle  Scaly skin from antibiotic reaction    Chart reviewed for the following:   Vance Cueva MD, have reviewed the History, Physical and updated the Allergic reactions for [address] [facility] [address] performed immediately prior to start of procedure:  Vance Cueva MD, have performed the following reviews on Eston Staggers prior to the start of the procedure:            * Patient was identified by name and date of birth   * Agreement on procedure being performed was verified  * Risks and Benefits explained to the patient  * Procedure site verified and marked as necessary  * Patient was positioned for comfort  * Consent was obtained     Time: 4:05 PM      Date of procedure: 4/1/2021    Procedure performed by:  Marzena Salinas MD    Ms. Yin tolerated the procedure well with no complications.     RADIOGRAPHS:  XR LEFT KNEE 10/13/20 [facility]  IMPRESSION:  Three views - No fractures, no effusion, moderately severe bilateral joint space narrowing, + osteophytes present. Kellgren Dariel grade 3    XR LEFT ELBOW 10/13/20 [facility]  IMPRESSION:  Two views - No fractures, negative fat pad sign, no joint space narrowing. XR BILAT KNEE 1/16/20 [facility]  IMPRESSION:  Three views - No fractures, no effusion, moderate medial joint space narrowing, + osteophytes present. Kellgren Dariel grade 2     IMPRESSION:      ICD-10-CM ICD-9-CM    1. Primary osteoarthritis of left knee  M17.12 715.16 hylan g-f 20 (SYNVISC) injection 8 mg      DRAIN/INJECT LARGE JOINT/BURSA   2. Chronic pain of left knee  M25.562 719.46 hylan g-f 20 (SYNVISC) injection 8 mg    G89.29 338.29 DRAIN/INJECT LARGE JOINT/BURSA   3. Primary osteoarthritis of right knee  M17.11 715.16 hylan g-f 20 (SYNVISC) injection 8 mg      DRAIN/INJECT LARGE JOINT/BURSA   4. Chronic pain of right knee  M25.561 719.46 hylan g-f 20 (SYNVISC) injection 8 mg    G89.29 338.29 DRAIN/INJECT LARGE JOINT/BURSA   5. Left elbow pain  M25.522 719.42    6. Lateral epicondylitis of left elbow  M77.12 726.32 betamethasone (CELESTONE) injection 3 mg      DRAIN/INJECT INTERMEDIATE JOINT/BURSA     PLAN:  After discussing treatment options, patient's knees were injected with 2 cc Synvisc and lateral epicondyle was injected with 4 cc Marcaine and 1/2 cc Celestone. There is no need for surgery at this time. She will follow up in 1 week for Synvisc #2.     Scribed by Saumya Merchant  ([address]) as dictated by Rosalba Gupta MD

## 2021-04-02 ENCOUNTER — HOSPITAL ENCOUNTER (EMERGENCY)
Age: 71
Discharge: HOME | End: 2021-04-02
Attending: EMERGENCY MEDICINE
Payer: MEDICARE

## 2021-04-02 VITALS
HEIGHT: 64 IN | WEIGHT: 229 LBS | RESPIRATION RATE: 17 BRPM | SYSTOLIC BLOOD PRESSURE: 172 MMHG | DIASTOLIC BLOOD PRESSURE: 83 MMHG | HEART RATE: 94 BPM | OXYGEN SATURATION: 98 % | BODY MASS INDEX: 39.09 KG/M2 | TEMPERATURE: 98.7 F

## 2021-04-02 DIAGNOSIS — R21 RASH AND OTHER NONSPECIFIC SKIN ERUPTION: Primary | ICD-10-CM

## 2021-04-02 PROCEDURE — 96372 THER/PROPH/DIAG INJ SC/IM: CPT

## 2021-04-02 PROCEDURE — 74011250636 HC RX REV CODE- 250/636: Performed by: EMERGENCY MEDICINE

## 2021-04-02 PROCEDURE — 99283 EMERGENCY DEPT VISIT LOW MDM: CPT

## 2021-04-02 RX ORDER — HYDROXYZINE 25 MG/1
TABLET, FILM COATED ORAL
Qty: 30 TAB | Refills: 0 | Status: SHIPPED | OUTPATIENT
Start: 2021-04-02 | End: 2022-03-11

## 2021-04-02 RX ORDER — PREDNISONE 10 MG/1
TABLET ORAL
Qty: 21 TAB | Refills: 0 | Status: SHIPPED | OUTPATIENT
Start: 2021-04-02 | End: 2021-12-20

## 2021-04-02 RX ORDER — PREDNISONE 10 MG/1
TABLET ORAL
Qty: 21 TAB | Refills: 0 | Status: SHIPPED | OUTPATIENT
Start: 2021-04-02 | End: 2021-04-02 | Stop reason: SDUPTHER

## 2021-04-02 RX ORDER — DIPHENHYDRAMINE HYDROCHLORIDE 50 MG/ML
50 INJECTION, SOLUTION INTRAMUSCULAR; INTRAVENOUS ONCE
Status: COMPLETED | OUTPATIENT
Start: 2021-04-02 | End: 2021-04-02

## 2021-04-02 RX ADMIN — METHYLPREDNISOLONE SODIUM SUCCINATE 125 MG: 125 INJECTION, POWDER, FOR SOLUTION INTRAMUSCULAR; INTRAVENOUS at 20:33

## 2021-04-02 RX ADMIN — DIPHENHYDRAMINE HYDROCHLORIDE 50 MG: 50 INJECTION, SOLUTION INTRAMUSCULAR; INTRAVENOUS at 20:34

## 2021-04-02 NOTE — ED PROVIDER NOTES
EMERGENCY DEPARTMENT HISTORY AND PHYSICAL EXAM    7:45 PM      Date: 4/2/2021  Patient Name: Jean Armijo    History of Presenting Illness     No chief complaint on file. History Provided By: Patient    Additional History (Context): Jean Armijo is a 78 y.o. female with diabetes, hypertension, hyperlipidemia, myocardial infarction and COPD who presents with itchiness of the skin neck and scaling rash. Patient recently on antibiotics. Patient denies fever, nausea, vomiting or diarrhea. Patient itchiness is all over her body. The dryness of skin and flaking is all over her body. Patient denies smoking, alcohol recreational drug use. PCP: Henok Quigley MD        Current Facility-Administered Medications   Medication Dose Route Frequency Provider Last Rate Last Admin    diphenhydrAMINE (BENADRYL) injection 50 mg  50 mg IntraMUSCular ONCE Manjeet Houser DO        methylPREDNISolone (PF) (Solu-MEDROL) injection 125 mg  125 mg IntraMUSCular NOW Brandt Houser DO         Current Outpatient Medications   Medication Sig Dispense Refill    hydrOXYzine HCL (ATARAX) 25 mg tablet Take 1-2 tablets by mouth every 6 hours as needed. 30 Tab 0    predniSONE (STERAPRED DS) 10 mg dose pack Take as directed 21 Tab 0    furosemide (LASIX) 40 mg tablet Take 1 Tab by mouth daily. 30 Tab 0    calcitRIOL (ROCALTROL) 0.25 mcg capsule Take 1 Cap by mouth daily. 30 Cap 0    cyanocobalamin 1,000 mcg tablet Take 1 Tab by mouth daily. 30 Tab 0    albuterol-ipratropium (DUO-NEB) 2.5 mg-0.5 mg/3 ml nebu 3 mL by Nebulization route every six (6) hours as needed for Wheezing. 30 Nebule 0    OTHER BMP in 1 week  Dx: ARF  Fax results to Dr. Liset Nicholson, Nephrology and PCP Dr. Crispin Camarillo. 1 Each 0    atorvastatin (LIPITOR) 80 mg tablet Take 1 Tab by mouth daily. 90 Tab 3    carvediloL (COREG) 25 mg tablet Take 1 Tab by mouth two (2) times daily (with meals).  180 Tab 3    tiZANidine (ZANAFLEX) 4 mg tablet Take 4 mg by mouth two (2) times a day.  lidocaine (Lidoderm) 5 % Apply patch to the affected area for 12 hours a day and remove for 12 hours a day. 1 Package 0    pantoprazole (PROTONIX) 40 mg tablet Take 1 Tab by mouth daily. 30 Tab 0    albuterol (PROVENTIL HFA, VENTOLIN HFA, PROAIR HFA) 90 mcg/actuation inhaler Take 2 Puffs by inhalation every four (4) hours as needed for Wheezing. 1 Inhaler 0    aspirin 81 mg chewable tablet Take 2 Tabs by mouth daily. 60 Tab 0    insulin detemir (LEVEMIR) 100 unit/mL injection 15 units daily for diabetes 2 Vial 0    desloratadine (CLARINEX) 5 mg tablet Take 1 Tab by mouth daily as needed for Allergies. 30 Tab 0    mometasone (NASONEX) 50 mcg/actuation nasal spray 2 Sprays by Both Nostrils route daily as needed. For allergies 3 Container 3    umeclidinium-vilanterol (ANORO ELLIPTA) 62.5-25 mcg/actuation inhaler Take 1 Puff by inhalation daily. For COPD 3 Inhaler 3       Past History     Past Medical History:  Past Medical History:   Diagnosis Date    Abnormal WBC count 5/17/2016    Anemia     Bilateral shoulder pain 9/27/2016    Chondromalacia of both patellae     COPD (chronic obstructive pulmonary disease) ([facility] [address] 9/2015    mild-mod dz; Dr Usama Sr    Diabetes [facility]) 2013    Diabetic eye exam ([facility] [address] 2016    Dilated cardiomyopathy ([facility] [address]     Dyslipidemia     Gout     Heart attack ([facility] [address]     Heart attack ([facility] [address] [facility]     Heart attack ([facility] [address] 10/18/2019    History of echocardiogram 11/14/2014    Mild LVE. EF 40%. Mild, diffuse hypk. Mild LAE.   Mild MR.      Hypercholesteremia 1/08/14    Hypertension 2000    Noncompliance with medications 2/16/2016    Obesity     Orthostatic hypotension 5/17/2016    Osteoarthritis of both knees     Pain management 04/01/2016    Dr. Tanisha Romero Popliteal cyst, bilateral      Vitamin D deficiency 10/16/14       Past Surgical History:  Past Surgical History:   Procedure Laterality Date    HX HEART CATHETERIZATION      HX TUBAL LIGATION         Family History:  Family History   Problem Relation Age of Onset    Diabetes Mother     Hypertension Mother     Hypertension Father     Kidney Disease Maternal Aunt 48        Dialysis       Social History:  Social History     Tobacco Use    Smoking status: Former Smoker     Packs/day: 0.25     Years: 48.00     Pack years: 12.00     Types: Cigarettes     Quit date: 2018     Years since quittin.8    Smokeless tobacco: Never Used   Substance Use Topics    Alcohol use: No     Alcohol/week: 0.0 standard drinks    Drug use: No       Allergies:  No Known Allergies      Review of Systems       Review of Systems   Constitutional: Negative. Negative for chills, diaphoresis and fever. HENT: Negative. Negative for congestion, rhinorrhea and sore throat. Eyes: Negative. Negative for pain, discharge and redness. Respiratory: Negative. Negative for cough, chest tightness, shortness of breath and wheezing. Cardiovascular: Negative. Negative for chest pain. Gastrointestinal: Negative. Negative for abdominal pain, constipation, diarrhea, nausea and vomiting. Genitourinary: Negative. Negative for dysuria, flank pain, frequency, hematuria and urgency. Musculoskeletal: Negative. Negative for back pain and neck pain. Skin: Positive for rash. Neurological: Negative. Negative for syncope, weakness, numbness and headaches. Psychiatric/Behavioral: Negative. All other systems reviewed and are negative. Physical Exam     Visit Vitals  BP (!) 155/113 (BP 1 Location: Right arm)   Pulse 94   Temp 98.7 °F (37.1 °C)   Resp 17   Ht 5' 4\" (1.626 m)   Wt 103.9 kg (229 lb)   SpO2 97%   BMI 39.31 kg/m²         Physical Exam  Vitals signs and nursing note reviewed. Constitutional:       General: She is not in acute distress. Appearance: Normal appearance. She is well-developed. She is not ill-appearing, toxic-appearing or diaphoretic.    HENT:      Head: Normocephalic and atraumatic. Mouth/Throat:      Pharynx: No oropharyngeal exudate. Eyes:      General: No scleral icterus. Conjunctiva/sclera: Conjunctivae normal.      Pupils: Pupils are equal, round, and reactive to light. Neck:      Musculoskeletal: Normal range of motion and neck supple. Thyroid: No thyromegaly. Vascular: No hepatojugular reflux or JVD. Trachea: No tracheal deviation. Cardiovascular:      Rate and Rhythm: Normal rate and regular rhythm. Pulses: Normal pulses. Radial pulses are 2+ on the right side and 2+ on the left side. Dorsalis pedis pulses are 2+ on the right side and 2+ on the left side. Heart sounds: Normal heart sounds, S1 normal and S2 normal. No murmur. No gallop. No S3 or S4 sounds. Pulmonary:      Effort: Pulmonary effort is normal. No respiratory distress. Breath sounds: Normal breath sounds. No decreased breath sounds, wheezing, rhonchi or rales. Abdominal:      General: Bowel sounds are normal. There is no distension. Palpations: Abdomen is soft. Abdomen is not rigid. There is no mass. Tenderness: There is no abdominal tenderness. There is no guarding or rebound. Negative signs include Grey's sign and McBurney's sign. Musculoskeletal: Normal range of motion. Lymphadenopathy:      Head:      Right side of head: No submental, submandibular, preauricular or occipital adenopathy. Left side of head: No submental, submandibular, preauricular or occipital adenopathy. Cervical: No cervical adenopathy. Upper Body:      Right upper body: No supraclavicular adenopathy. Left upper body: No supraclavicular adenopathy. Skin:     General: Skin is warm and dry. Findings: Rash present. Rash is scaling. Neurological:      Mental Status: She is alert. She is not disoriented. GCS: GCS eye subscore is 4. GCS verbal subscore is 5. GCS motor subscore is 6. Cranial Nerves: No cranial nerve deficit. Sensory: No sensory deficit. Coordination: Coordination normal.      Gait: Gait normal.      Deep Tendon Reflexes: Reflexes are normal and symmetric. Psychiatric:         Speech: Speech normal.         Behavior: Behavior normal.         Thought Content: Thought content normal.         Judgment: Judgment normal.           Diagnostic Study Results     Labs -  No results found for this or any previous visit (from the past 12 hour(s)). Radiologic Studies -   No orders to display         Medical Decision Making   Provider Notes (Medical Decision Making):  MDM  Number of Diagnoses or Management Options  Diagnosis management comments: Reaction to medication  Rash  Devon's Randy syndrome      I am the first provider for this patient. I reviewed the vital signs, available nursing notes, past medical history, past surgical history, family history and social history. Vital Signs-Reviewed the patient's vital signs. Records Reviewed: Nursing Notes (Time of Review: 7:45 PM)    ED Course: Progress Notes, Reevaluation, and Consults:    Benadryl and Solu-Medrol here. 8:45 PM 4/2/2021        Diagnosis       I have reassessed the patient. Patient will be prescribed Atarax and Sterapred. Patient was discharged in stable condition. Patient is to return to emergency department if any new or worsening condition. Clinical Impression:   1.  Rash and other nonspecific skin eruption        Disposition: Discharged home     Follow-up Information     Follow up With Specialties Details Why Contact Info    Dani Chavez MD Internal Medicine In 2 days  [facility]  [phone]               Attestation        Provider Attestation:     I personally performed the services described in the documentation, reviewed the documentation and it accurately and completely records my words and actions utilizing the [facility] software April 02, 2021 at 8:24 PM - Bishnu Butler DO    Disclaimer. It is dictated using utilizing voice recognition software. Unfortunately this leads to occasional typographical errors. I apologize in advance if the situation occurs. If questions arise please do not hesitate to contact me or call our department.

## 2021-04-08 ENCOUNTER — OFFICE VISIT (OUTPATIENT)
Dept: ORTHOPEDIC SURGERY | Age: 71
End: 2021-04-08
Payer: MEDICARE

## 2021-04-08 ENCOUNTER — PATIENT OUTREACH (OUTPATIENT)
Dept: CASE MANAGEMENT | Age: 71
End: 2021-04-08

## 2021-04-08 VITALS
OXYGEN SATURATION: 97 % | RESPIRATION RATE: 16 BRPM | WEIGHT: 223 LBS | BODY MASS INDEX: 38.07 KG/M2 | HEIGHT: 64 IN | HEART RATE: 82 BPM | TEMPERATURE: 96.7 F

## 2021-04-08 DIAGNOSIS — G89.29 CHRONIC PAIN OF RIGHT KNEE: ICD-10-CM

## 2021-04-08 DIAGNOSIS — G89.29 CHRONIC PAIN OF LEFT KNEE: ICD-10-CM

## 2021-04-08 DIAGNOSIS — M25.561 CHRONIC PAIN OF RIGHT KNEE: ICD-10-CM

## 2021-04-08 DIAGNOSIS — M17.11 PRIMARY OSTEOARTHRITIS OF RIGHT KNEE: ICD-10-CM

## 2021-04-08 DIAGNOSIS — M25.562 CHRONIC PAIN OF LEFT KNEE: ICD-10-CM

## 2021-04-08 DIAGNOSIS — M17.12 PRIMARY OSTEOARTHRITIS OF LEFT KNEE: Primary | ICD-10-CM

## 2021-04-08 PROCEDURE — 20610 DRAIN/INJ JOINT/BURSA W/O US: CPT | Performed by: SPECIALIST

## 2021-04-08 NOTE — PROGRESS NOTES
Care Transitions Nurse ( CTN) attempted to contact patient via telephone call for transition of care regarding  hospital discharge of 3-23-21 and ED Visit to [facility] Emergency Department on 4-2-21. There was no response. A voicemail message was left requesting a non-emergency return telephone call. CTN  contact information provided.

## 2021-04-08 NOTE — PROGRESS NOTES
Patient: Alexx Coleman                MRN: [id number]       SSN: [id number]  YOB: 1950        AGE: 78 y.o. SEX: female  Body mass index is 38.28 kg/m². PCP: Chun Johnson MD  04/08/21    Chief Complaint   Patient presents with    Knee Pain     bilateral     HISTORY:  Alexx Coleman is a 78 y.o. female who is seen for bilateral knee pain. ICD-10-CM ICD-9-CM    1. Primary osteoarthritis of left knee  M17.12 715.16 hylan g-f 20 (SYNVISC) injection 8 mg      DRAIN/INJECT LARGE JOINT/BURSA   2. Chronic pain of left knee  M25.562 719.46 hylan g-f 20 (SYNVISC) injection 8 mg    G89.29 338.29 DRAIN/INJECT LARGE JOINT/BURSA   3. Primary osteoarthritis of right knee  M17.11 715.16 hylan g-f 20 (SYNVISC) injection 8 mg      DRAIN/INJECT LARGE JOINT/BURSA   4. Chronic pain of right knee  M25.561 719.46 hylan g-f 20 (SYNVISC) injection 8 mg    G89.29 338.29 DRAIN/INJECT LARGE JOINT/BURSA       Chart reviewed for the following:   Evin Delgado MD, have reviewed the History, Physical and updated the Allergic reactions for [address] [facility] [address] performed immediately prior to start of procedure:  Evin Delgado MD, have performed the following reviews on Alexx Coleman prior to the start of the procedure:            * Patient was identified by name and date of birth   * Agreement on procedure being performed was verified  * Risks and Benefits explained to the patient  * Procedure site verified and marked as necessary  * Patient was positioned for comfort  * Consent was obtained     Time: 4:47 PM     Date of procedure: 4/8/2021    Procedure performed by:  Marie Tim MD    Ms. Yin tolerated the procedure well with no complications. PLAN:  After discussing treatment options, patient's knees were injected with 2 cc Synvisc. Ms. Jeffy Tirado will follow up in one week to complete her visco supplementation injection series.       Scribed by Evelin Ojeda ([address] [place] [address] Rd [facility]) as dictated by Ben Diaz MD

## 2021-04-09 ENCOUNTER — PATIENT OUTREACH (OUTPATIENT)
Dept: CASE MANAGEMENT | Age: 71
End: 2021-04-09

## 2021-04-09 NOTE — PROGRESS NOTES
Transition of Care     Care Transitions Nurse ( CTN) unable to reach patient for follow up. No further outreach planned at this time. Transition of Care Episode Closed 4-9-2021.

## 2021-04-26 ENCOUNTER — OFFICE VISIT (OUTPATIENT)
Dept: ORTHOPEDIC SURGERY | Age: 71
End: 2021-04-26
Payer: MEDICARE

## 2021-04-26 VITALS
HEIGHT: 64 IN | TEMPERATURE: 96.9 F | BODY MASS INDEX: 39.57 KG/M2 | WEIGHT: 231.8 LBS | OXYGEN SATURATION: 95 % | RESPIRATION RATE: 16 BRPM | HEART RATE: 79 BPM

## 2021-04-26 DIAGNOSIS — M25.511 ACUTE PAIN OF RIGHT SHOULDER: ICD-10-CM

## 2021-04-26 DIAGNOSIS — G89.29 CHRONIC PAIN OF RIGHT KNEE: ICD-10-CM

## 2021-04-26 DIAGNOSIS — M19.011 ARTHRITIS OF RIGHT ACROMIOCLAVICULAR JOINT: ICD-10-CM

## 2021-04-26 DIAGNOSIS — M75.51 CHRONIC BURSITIS OF RIGHT SHOULDER: ICD-10-CM

## 2021-04-26 DIAGNOSIS — G89.29 CHRONIC PAIN OF LEFT KNEE: ICD-10-CM

## 2021-04-26 DIAGNOSIS — M25.562 CHRONIC PAIN OF LEFT KNEE: ICD-10-CM

## 2021-04-26 DIAGNOSIS — M17.12 PRIMARY OSTEOARTHRITIS OF LEFT KNEE: Primary | ICD-10-CM

## 2021-04-26 DIAGNOSIS — M25.561 CHRONIC PAIN OF RIGHT KNEE: ICD-10-CM

## 2021-04-26 DIAGNOSIS — M17.11 PRIMARY OSTEOARTHRITIS OF RIGHT KNEE: ICD-10-CM

## 2021-04-26 PROCEDURE — 3288F FALL RISK ASSESSMENT DOCD: CPT | Performed by: SPECIALIST

## 2021-04-26 PROCEDURE — G8536 NO DOC ELDER MAL SCRN: HCPCS | Performed by: SPECIALIST

## 2021-04-26 PROCEDURE — G8399 PT W/DXA RESULTS DOCUMENT: HCPCS | Performed by: SPECIALIST

## 2021-04-26 PROCEDURE — G9899 SCRN MAM PERF RSLTS DOC: HCPCS | Performed by: SPECIALIST

## 2021-04-26 PROCEDURE — 1100F PTFALLS ASSESS-DOCD GE2>/YR: CPT | Performed by: SPECIALIST

## 2021-04-26 PROCEDURE — G8432 DEP SCR NOT DOC, RNG: HCPCS | Performed by: SPECIALIST

## 2021-04-26 PROCEDURE — 99213 OFFICE O/P EST LOW 20 MIN: CPT | Performed by: SPECIALIST

## 2021-04-26 PROCEDURE — 3017F COLORECTAL CA SCREEN DOC REV: CPT | Performed by: SPECIALIST

## 2021-04-26 PROCEDURE — G8756 NO BP MEASURE DOC: HCPCS | Performed by: SPECIALIST

## 2021-04-26 PROCEDURE — 20610 DRAIN/INJ JOINT/BURSA W/O US: CPT | Performed by: SPECIALIST

## 2021-04-26 PROCEDURE — G8417 CALC BMI ABV UP PARAM F/U: HCPCS | Performed by: SPECIALIST

## 2021-04-26 PROCEDURE — 73030 X-RAY EXAM OF SHOULDER: CPT | Performed by: SPECIALIST

## 2021-04-26 PROCEDURE — 1090F PRES/ABSN URINE INCON ASSESS: CPT | Performed by: SPECIALIST

## 2021-04-26 PROCEDURE — G8427 DOCREV CUR MEDS BY ELIG CLIN: HCPCS | Performed by: SPECIALIST

## 2021-04-26 RX ORDER — BETAMETHASONE SODIUM PHOSPHATE AND BETAMETHASONE ACETATE 3; 3 MG/ML; MG/ML
3 INJECTION, SUSPENSION INTRA-ARTICULAR; INTRALESIONAL; INTRAMUSCULAR; SOFT TISSUE ONCE
Status: COMPLETED | OUTPATIENT
Start: 2021-04-26 | End: 2021-04-26

## 2021-04-26 RX ADMIN — BETAMETHASONE SODIUM PHOSPHATE AND BETAMETHASONE ACETATE 3 MG: 3; 3 INJECTION, SUSPENSION INTRA-ARTICULAR; INTRALESIONAL; INTRAMUSCULAR; SOFT TISSUE at 11:40

## 2021-04-26 NOTE — PATIENT INSTRUCTIONS
Shoulder Bursitis: Exercises  Introduction  Here are some examples of exercises for you to try. The exercises may be suggested for a condition or for rehabilitation. Start each exercise slowly. Ease off the exercises if you start to have pain. You will be told when to start these exercises and which ones will work best for you. How to do the exercises  Posterior stretching exercise   1. Hold the elbow of your injured arm with your other hand. 2. Use your hand to pull your injured arm gently up and across your body. You will feel a gentle stretch across the back of your injured shoulder. 3. Hold for at least 15 to 30 seconds. Then slowly lower your arm. 4. Repeat 2 to 4 times. Up-the-back stretch   Your doctor or physical therapist may want you to wait to do this stretch until you have regained most of your range of motion and strength. You can do this stretch in different ways. Hold any of these stretches for at least 15 to 30 seconds. Repeat them 2 to 4 times. 1. Light stretch: Put your hand in your back pocket. Let it rest there to stretch your shoulder. 2. Moderate stretch: With your other hand, hold your injured arm (palm outward) behind your back by the wrist. Pull your arm up gently to stretch your shoulder. 3. Advanced stretch: Put a towel over your other shoulder. Put the hand of your injured arm behind your back. Now hold the back end of the towel. With the other hand, hold the front end of the towel in front of your body. Pull gently on the front end of the towel. This will bring your hand farther up your back to stretch your shoulder. Overhead stretch   1. Standing about an arm's length away, grasp onto a solid surface. You could use a countertop, a doorknob, or the back of a sturdy chair. 2. With your knees slightly bent, bend forward with your arms straight. Lower your upper body, and let your shoulders stretch.   3. As your shoulders are able to stretch farther, you may need to take a step or two backward. 4. Hold for at least 15 to 30 seconds. Then stand up and relax. If you had stepped back during your stretch, step forward so you can keep your hands on the solid surface. 5. Repeat 2 to 4 times. Shoulder flexion (lying down)   To make a wand for this exercise, use a piece of PVC pipe or a broom handle with the broom removed. Make the wand about a foot wider than your shoulders. 1. Lie on your back, holding a wand with both hands. Your palms should face down as you hold the wand. 2. Keeping your elbows straight, slowly raise your arms over your head. Raise them until you feel a stretch in your shoulders, upper back, and chest.  3. Hold for 15 to 30 seconds. 4. Repeat 2 to 4 times. Shoulder rotation (lying down)   To make a wand for this exercise, use a piece of PVC pipe or a broom handle with the broom removed. Make the wand about a foot wider than your shoulders. 1. Lie on your back. Hold a wand with both hands with your elbows bent and palms up. 2. Keep your elbows close to your body, and move the wand across your body toward the sore arm. 3. Hold for 8 to 12 seconds. 4. Repeat 2 to 4 times. Shoulder blade squeeze   1. Stand with your arms at your sides, and squeeze your shoulder blades together. Do not raise your shoulders up as you squeeze. 2. Hold 6 seconds. 3. Repeat 8 to 12 times. Shoulder flexor and extensor exercise   These are isometric exercises. That means you contract your muscles without actually moving. 1. Push forward (flex): Stand facing a wall or doorjamb, about 6 inches or less back. Hold your injured arm against your body. Make a closed fist with your thumb on top. Then gently push your hand forward into the wall with about 25% to 50% of your strength. Don't let your body move backward as you push. Hold for about 6 seconds. Relax for a few seconds. Repeat 8 to 12 times. 2. Push backward (extend): Stand with your back flat against a wall.  Your upper arm should be against the wall, with your elbow bent 90 degrees (your hand straight ahead). Push your elbow gently back against the wall with about 25% to 50% of your strength. Don't let your body move forward as you push. Hold for about 6 seconds. Relax for a few seconds. Repeat 8 to 12 times. Scapular exercise: Wall push-ups   This exercise is best done with your fingers somewhat turned out, rather than straight up and down. 1. Stand facing a wall, about 12 inches to 18 inches away. 2. Place your hands on the wall at shoulder height. 3. Slowly bend your elbows and bring your face to the wall. Keep your back and hips straight. 4. Push back to where you started. 5. Repeat 8 to 12 times. 6. When you can do this exercise against a wall comfortably, you can try it against a counter. You can then slowly progress to the end of a couch, then to a sturdy chair, and finally to the floor. Scapular exercise: Retraction   For this exercise, you will need elastic exercise material, such as surgical tubing or Thera-Band. 1. Put the band around a solid object at about waist level. (A bedpost will work well.) Each hand should hold an end of the band. 2. With your elbows at your sides and bent to 90 degrees, pull the band back. Your shoulder blades should move toward each other. Then move your arms back where you started. 3. Repeat 8 to 12 times. 4. If you have good range of motion in your shoulders, try this exercise with your arms lifted out to the sides. Keep your elbows at a 90-degree angle. Raise the elastic band up to about shoulder level. Pull the band back to move your shoulder blades toward each other. Then move your arms back where you started. Internal rotator strengthening exercise   1. Start by tying a piece of elastic exercise material to a doorknob. You can use surgical tubing or Thera-Band. 2. Stand or sit with your shoulder relaxed and your elbow bent 90 degrees.  Your upper arm should rest comfortably against your side. Squeeze a rolled towel between your elbow and your body for comfort. This will help keep your arm at your side. 3. Hold one end of the elastic band in the hand of the painful arm. 4. Slowly rotate your forearm toward your body until it touches your belly. Slowly move it back to where you started. 5. Keep your elbow and upper arm firmly tucked against the towel roll or at your side. 6. Repeat 8 to 12 times. External rotator strengthening exercise   1. Start by tying a piece of elastic exercise material to a doorknob. You can use surgical tubing or Thera-Band. (You may also hold one end of the band in each hand.)  2. Stand or sit with your shoulder relaxed and your elbow bent 90 degrees. Your upper arm should rest comfortably against your side. Squeeze a rolled towel between your elbow and your body for comfort. This will help keep your arm at your side. 3. Hold one end of the elastic band with the hand of the painful arm. 4. Start with your forearm across your belly. Slowly rotate the forearm out away from your body. Keep your elbow and upper arm tucked against the towel roll or the side of your body until you begin to feel tightness in your shoulder. Slowly move your arm back to where you started. 5. Repeat 8 to 12 times. Follow-up care is a key part of your treatment and safety. Be sure to make and go to all appointments, and call your doctor if you are having problems. It's also a good idea to know your test results and keep a list of the medicines you take. Where can you learn more? Go to http://www.gray.com/  Enter Z022 in the search box to learn more about \"Shoulder Bursitis: Exercises. \"  Current as of: November 16, 2020               Content Version: 12.8  © 5383-1619 [employer]. Care instructions adapted under license by [employer] (which disclaims liability or warranty for this information).  If you have questions about a medical condition or this instruction, always ask your healthcare professional. [place] [address] any warranty or liability for your use of this information.

## 2021-04-26 NOTE — PROGRESS NOTES
Patient: Quincy Ormond                MRN: [id number]       SSN: [id number]  YOB: 1950        AGE: 78 y.o. SEX: female      PCP: Albina Shah MD  21    CC: RIGHT SHOULDER AND BILATERAL KNEE PAIN    HISTORY:  Quincy Ormond is a 78 y.o. female who is seen for right shoulder and  bilateral knee pains. She has been experiencing right shoulder pain for the past two weeks. She began feeling shoulder pain after driving 8 hours to [facility]. She feels shoulder pain with overhead activities and at night. She also has pain when picking up objects. She is right handed. She is also seen for bilateral knee pain. She has been feeling increased knee pain since her fall on 20. She notes pain when bending to water her flowers with a watering can. Her knees hurt so much that she even has difficulty bending her knees to put on her pants. She completed a successful Synvisc series on 20. She was previously seen for left elbow pain. She fell on 20 while she was walking through her house. She landed on her knee and struck her left elbow. She has had increased pain since. She has difficulty straightening out her elbow. She has been experiencing left elbow pain for the past 3 months. She has pain when lifting and gripping objects. Pain Assessment  2021   Location of Pain Knee   Location Modifiers Right;Left   Severity of Pain 5   Quality of Pain Sharp   Quality of Pain Comment -   Duration of Pain Persistent   Frequency of Pain Constant   Aggravating Factors (No Data)   Aggravating Factors Comment sitting then getting up   Limiting Behavior -   Relieving Factors Nothing   Result of Injury No   Work-Related Injury -   Type of Injury -   Type of Injury Comment -     Occupation, etc:  Ms. Claudette Manual receives [address] [facility] [facility] [address] for COPD and knee arthritis. She moved from [place] to [facility] with her  when her mother  several years ago.  She moved from [facility] to [facility] 10 years ago. She and her  live in [facility] [place] [facility] [place]. She has 3 sons--1 in [place] and 2 in [facility] and 13 grandchildren. She has 2 Chihuahua-Poodle mix dogs--Frisky and Boots. Zahratis Luz Marina and Boots are 1 yo sisters. Her weight fluctuates. She does not exercise regularly. She is an insulin-controlled diabetic. She is 231 pounds and 5'4\" tall. Last 3 Recorded Weights in this Encounter    21 1107   Weight: 231 lb 12.8 oz (105.1 kg)     Body mass index is 39.79 kg/m². REVIEW OF SYSTEMS: All Below are Negative except: See HPI     Constitutional: negative for fever, chills, and weight loss. Cardiovascular: negative for chest pain, claudication, leg swelling, SOB, PIERSON   Gastrointestinal: Negative for pain, N/V/C/D, Blood in stool or urine, dysuria,  hematuria, incontinence, pelvic pain. Musculoskeletal: See HPI   Neurological: Negative for dizziness and weakness. Negative for headaches, Visual changes, confusion, seizures   Phychiatric/Behavioral: Negative for depression, memory loss, substance  abuse. Extremities: Negative for hair changes, rash, or skin lesion changes. Hematologic: Negative for bleeding problems, bruising, pallor or swollen lymph  nodes   Peripheral Vascular: No calf pain, no circulation deficits.     Social History     Socioeconomic History    Marital status:      Spouse name: Not on file    Number of children: 3    Years of education: Not on file    Highest education level: Not on file   Occupational History    Occupation: retired   Social Needs    Financial resource strain: Not on file    Food insecurity     Worry: Not on file     Inability: Not on file   [employer] needs     Medical: Not on file     Non-medical: Not on file   Tobacco Use    Smoking status: Former Smoker     Packs/day: 0.25     Years: 48.00     Pack years: 12.00     Types: Cigarettes     Quit date: 2018     Years since quittin.9    Smokeless tobacco: Never Used Substance and Sexual Activity    Alcohol use: No     Alcohol/week: 0.0 standard drinks    Drug use: No    Sexual activity: Yes     Partners: Male   Lifestyle    Physical activity     Days per week: Not on file     Minutes per session: Not on file    Stress: Not on file   Relationships    Social connections     Talks on phone: Not on file     Gets together: Not on file     Attends [employer] service: Not on file     Active member of club or organization: Not on file     Attends meetings of clubs or organizations: Not on file     Relationship status: Not on file    Intimate partner violence     Fear of current or ex partner: Not on file     Emotionally abused: Not on file     Physically abused: Not on file     Forced sexual activity: Not on file   Other Topics Concern     Service No    Blood Transfusions No    Caffeine Concern No    Occupational Exposure No    Hobby Hazards No    Sleep Concern No    Stress Concern No    Weight Concern No    Special Diet No    Back Care No    Exercise No    Bike Helmet No    Seat Belt Yes    Self-Exams Yes   Social History Narrative    Not on file        No Known Allergies     Current Outpatient Medications   Medication Sig    hydrOXYzine HCL (ATARAX) 25 mg tablet Take 1-2 tablets by mouth every 6 hours as needed.  predniSONE (STERAPRED DS) 10 mg dose pack Take as directed    furosemide (LASIX) 40 mg tablet Take 1 Tab by mouth daily.  calcitRIOL (ROCALTROL) 0.25 mcg capsule Take 1 Cap by mouth daily.  cyanocobalamin 1,000 mcg tablet Take 1 Tab by mouth daily.  albuterol-ipratropium (DUO-NEB) 2.5 mg-0.5 mg/3 ml nebu 3 mL by Nebulization route every six (6) hours as needed for Wheezing.  OTHER BMP in 1 week  Dx: ARF  Fax results to Dr. Moustapha Turcios, Nephrology and PCP Dr. Susu Velasquez.  atorvastatin (LIPITOR) 80 mg tablet Take 1 Tab by mouth daily.  carvediloL (COREG) 25 mg tablet Take 1 Tab by mouth two (2) times daily (with meals).     tiZANidine (ZANAFLEX) 4 mg tablet Take 4 mg by mouth two (2) times a day.  lidocaine (Lidoderm) 5 % Apply patch to the affected area for 12 hours a day and remove for 12 hours a day.  pantoprazole (PROTONIX) 40 mg tablet Take 1 Tab by mouth daily.  albuterol (PROVENTIL HFA, VENTOLIN HFA, PROAIR HFA) 90 mcg/actuation inhaler Take 2 Puffs by inhalation every four (4) hours as needed for Wheezing.  aspirin 81 mg chewable tablet Take 2 Tabs by mouth daily.  insulin detemir (LEVEMIR) 100 unit/mL injection 15 units daily for diabetes    desloratadine (CLARINEX) 5 mg tablet Take 1 Tab by mouth daily as needed for Allergies.  mometasone (NASONEX) 50 mcg/actuation nasal spray 2 Sprays by Both Nostrils route daily as needed. For allergies    umeclidinium-vilanterol (ANORO ELLIPTA) 62.5-25 mcg/actuation inhaler Take 1 Puff by inhalation daily. For COPD     No current facility-administered medications for this visit.          PHYSICAL EXAMINATION:  Visit Vitals  Pulse 79   Temp 96.9 °F (36.1 °C) (Temporal)   Resp 16   Ht 5' 4\" (1.626 m)   Wt 231 lb 12.8 oz (105.1 kg)   SpO2 95%   BMI 39.79 kg/m²      ORTHO EXAMINATION:  Examination Left knee Right knee   Skin Intact, childhood scars Intact, childhood scars   Range of motion 110-0 110-0   Effusion - +   Medial joint line tenderness + +   Lateral joint line tenderness - -   Popliteal tenderness - -   Osteophytes palpable + +   Mayurs - -   Patella crepitus + +   Anterior drawer - -   Lateral laxity - -   Medial laxity - -   Varus deformity - -   Valgus deformity - -   Pretibial edema 3+ 3+   Calf tenderness - -   Pain over L tibial tubercle  Scaly skin from antibiotic reaction  Examination Right shoulder Left shoulder   Skin Intact Intact   Effusion - -   Biceps deformity - -   Atrophy - -   AC joint tenderness - -   Acromial tenderness + -   Biceps tenderness - -   Forward flexion/Elevation  170   Active abduction  170   External rotation ROM 20 30   Internal rotation ROM 60 90   Apprehension - -   Impingement - -   Drop Arm Test - -   Neurovascular Intact Intact        Chart reviewed for the following:   I, Wendy Padilla MD, have reviewed the History, Physical and updated the Allergic reactions for [address] performed immediately prior to start of procedure:  Pieter Litten, MD, have performed the following reviews on Johanny Limb prior to the start of the procedure:            * Patient was identified by name and date of birth   * Agreement on procedure being performed was verified  * Risks and Benefits explained to the patient  * Procedure site verified and marked as necessary  * Patient was positioned for comfort  * Consent was obtained     Time: 11:31 AM       Date of procedure: 4/26/2021    Procedure performed by:  Wendy Padilla MD    Ms. Yin tolerated the procedure well with no complications. RADIOGRAPHS:  XR RIGHT SHOULDER 4/26/21 [facility]  IMPRESSION:  Three views - No fractures, severe acromioclavicular narrowing with bone spur, mild glenohumeral narrowing, no calcific densities. XR LEFT KNEE 10/13/20 [facility]  IMPRESSION:  Three views - No fractures, no effusion, moderately severe bilateral joint space narrowing, + osteophytes present. Kellgren Dariel grade 3    XR LEFT ELBOW 10/13/20 [facility]  IMPRESSION:  Two views - No fractures, negative fat pad sign, no joint space narrowing. XR BILAT KNEE 1/16/20 [facility]  IMPRESSION:  Three views - No fractures, no effusion, moderate medial joint space narrowing, + osteophytes present. Kellgren Dariel grade 2     IMPRESSION:      ICD-10-CM ICD-9-CM    1. Primary osteoarthritis of left knee  M17.12 715.16 hylan g-f 20 (SYNVISC) injection 8 mg      DRAIN/INJECT LARGE JOINT/BURSA   2. Chronic pain of left knee  M25.562 719.46 hylan g-f 20 (SYNVISC) injection 8 mg    G89.29 338.29 DRAIN/INJECT LARGE JOINT/BURSA   3.  Primary osteoarthritis of right knee  M17.11 715.16 hylan g-f 20 (SYNVISC) injection 8 mg      DRAIN/INJECT LARGE JOINT/BURSA   4. Chronic pain of right knee  M25.561 719.46 hylan g-f 20 (SYNVISC) injection 8 mg    G89.29 338.29 DRAIN/INJECT LARGE JOINT/BURSA   5. Acute pain of right shoulder  M25.511 719.41 AMB POC XRAY, SHOULDER; COMPLETE, 2+   6. Arthritis of right acromioclavicular joint  M19.011 716.91 betamethasone (CELESTONE) injection 3 mg      DRAIN/INJECT LARGE JOINT/BURSA   7. Chronic bursitis of right shoulder  M75.51 726.10      PLAN:   Joint protective exercises were outlined today. After discussing treatment options, patient's knees were injected with 2 cc Synvisc and right shoulder was injected with 4 cc Marcaine and 1/2 cc Celestone. There is no need for surgery at this time. Ms. Jeffy Tirado will follow up PRN now that she has completed her visco supplementation injection series.      Scribed by Evelin Ojeda  ([address]) as dictated by Camilo Andres MD

## 2021-12-16 ENCOUNTER — HOSPITAL ENCOUNTER (INPATIENT)
Age: 71
LOS: 3 days | Discharge: HOME | DRG: 683 | End: 2021-12-20
Attending: EMERGENCY MEDICINE | Admitting: FAMILY MEDICINE
Payer: MEDICARE

## 2021-12-16 ENCOUNTER — APPOINTMENT (OUTPATIENT)
Dept: CT IMAGING | Age: 71
DRG: 683 | End: 2021-12-16
Attending: EMERGENCY MEDICINE
Payer: MEDICARE

## 2021-12-16 DIAGNOSIS — N17.9 ACUTE RENAL FAILURE, UNSPECIFIED ACUTE RENAL FAILURE TYPE (HCC): Primary | ICD-10-CM

## 2021-12-16 LAB
ALBUMIN SERPL-MCNC: 3.5 G/DL (ref 3.4–5)
ALBUMIN/GLOB SERPL: 1.1 {RATIO} (ref 0.8–1.7)
ALP SERPL-CCNC: 193 U/L (ref 45–117)
ALT SERPL-CCNC: 15 U/L (ref 13–56)
ANION GAP SERPL CALC-SCNC: 14 MMOL/L (ref 3–18)
AST SERPL-CCNC: 11 U/L (ref 10–38)
BASOPHILS # BLD: 0 K/UL (ref 0–0.1)
BASOPHILS NFR BLD: 0 % (ref 0–2)
BILIRUB SERPL-MCNC: 0.3 MG/DL (ref 0.2–1)
BUN SERPL-MCNC: 144 MG/DL (ref 7–18)
BUN/CREAT SERPL: 23 (ref 12–20)
CALCIUM SERPL-MCNC: 9.2 MG/DL (ref 8.5–10.1)
CHLORIDE SERPL-SCNC: 112 MMOL/L (ref 100–111)
CK MB CFR SERPL CALC: 2.8 % (ref 0–4)
CK MB SERPL-MCNC: 2 NG/ML (ref 5–25)
CK SERPL-CCNC: 71 U/L (ref 26–192)
CO2 SERPL-SCNC: 13 MMOL/L (ref 21–32)
CREAT SERPL-MCNC: 6.26 MG/DL (ref 0.6–1.3)
DIFFERENTIAL METHOD BLD: ABNORMAL
EOSINOPHIL # BLD: 0.1 K/UL (ref 0–0.4)
EOSINOPHIL NFR BLD: 1 % (ref 0–5)
ERYTHROCYTE [DISTWIDTH] IN BLOOD BY AUTOMATED COUNT: 14.6 % (ref 11.6–14.5)
GLOBULIN SER CALC-MCNC: 3.3 G/DL (ref 2–4)
GLUCOSE SERPL-MCNC: 192 MG/DL (ref 74–99)
HCT VFR BLD AUTO: 34.6 % (ref 35–45)
HGB BLD-MCNC: 10.1 G/DL (ref 12–16)
IMM GRANULOCYTES # BLD AUTO: 0 K/UL (ref 0–0.04)
IMM GRANULOCYTES NFR BLD AUTO: 1 % (ref 0–0.5)
LYMPHOCYTES # BLD: 1.2 K/UL (ref 0.9–3.6)
LYMPHOCYTES NFR BLD: 18 % (ref 21–52)
MCH RBC QN AUTO: 28 PG (ref 24–34)
MCHC RBC AUTO-ENTMCNC: 29.2 G/DL (ref 31–37)
MCV RBC AUTO: 95.8 FL (ref 78–100)
MONOCYTES # BLD: 0.8 K/UL (ref 0.05–1.2)
MONOCYTES NFR BLD: 13 % (ref 3–10)
NEUTS SEG # BLD: 4.4 K/UL (ref 1.8–8)
NEUTS SEG NFR BLD: 68 % (ref 40–73)
NRBC # BLD: 0.07 K/UL (ref 0–0.01)
NRBC BLD-RTO: 1.1 PER 100 WBC
PLATELET # BLD AUTO: 364 K/UL (ref 135–420)
PMV BLD AUTO: 9.5 FL (ref 9.2–11.8)
POTASSIUM SERPL-SCNC: 6.3 MMOL/L (ref 3.5–5.5)
PROT SERPL-MCNC: 6.8 G/DL (ref 6.4–8.2)
RBC # BLD AUTO: 3.61 M/UL (ref 4.2–5.3)
SODIUM SERPL-SCNC: 139 MMOL/L (ref 136–145)
TROPONIN-HIGH SENSITIVITY: 28 NG/L (ref 0–54)
WBC # BLD AUTO: 6.5 K/UL (ref 4.6–13.2)

## 2021-12-16 PROCEDURE — 70450 CT HEAD/BRAIN W/O DYE: CPT

## 2021-12-16 PROCEDURE — 83036 HEMOGLOBIN GLYCOSYLATED A1C: CPT

## 2021-12-16 PROCEDURE — 82553 CREATINE MB FRACTION: CPT

## 2021-12-16 PROCEDURE — 85025 COMPLETE CBC W/AUTO DIFF WBC: CPT

## 2021-12-16 PROCEDURE — 99284 EMERGENCY DEPT VISIT MOD MDM: CPT

## 2021-12-16 PROCEDURE — 80053 COMPREHEN METABOLIC PANEL: CPT

## 2021-12-17 ENCOUNTER — APPOINTMENT (OUTPATIENT)
Dept: ULTRASOUND IMAGING | Age: 71
DRG: 683 | End: 2021-12-17
Attending: STUDENT IN AN ORGANIZED HEALTH CARE EDUCATION/TRAINING PROGRAM
Payer: MEDICARE

## 2021-12-17 ENCOUNTER — APPOINTMENT (OUTPATIENT)
Dept: ULTRASOUND IMAGING | Age: 71
DRG: 683 | End: 2021-12-17
Attending: INTERNAL MEDICINE
Payer: MEDICARE

## 2021-12-17 LAB
ANION GAP SERPL CALC-SCNC: 13 MMOL/L (ref 3–18)
APPEARANCE UR: CLEAR
ATRIAL RATE: 97 BPM
BACTERIA URNS QL MICRO: NEGATIVE /HPF
BILIRUB UR QL: NEGATIVE
BUN SERPL-MCNC: 149 MG/DL (ref 7–18)
BUN/CREAT SERPL: 29 (ref 12–20)
CALCIUM SERPL-MCNC: 8.8 MG/DL (ref 8.5–10.1)
CALCULATED P AXIS, ECG09: 78 DEGREES
CALCULATED R AXIS, ECG10: 23 DEGREES
CALCULATED T AXIS, ECG11: 53 DEGREES
CHLORIDE SERPL-SCNC: 114 MMOL/L (ref 100–111)
CO2 SERPL-SCNC: 14 MMOL/L (ref 21–32)
COLOR UR: YELLOW
CREAT SERPL-MCNC: 5.17 MG/DL (ref 0.6–1.3)
CREAT UR-MCNC: 220 MG/DL (ref 30–125)
DIAGNOSIS, 93000: NORMAL
EPITH CASTS URNS QL MICRO: ABNORMAL /LPF (ref 0–5)
EST. AVERAGE GLUCOSE BLD GHB EST-MCNC: 151 MG/DL
GLUCOSE BLD STRIP.AUTO-MCNC: 107 MG/DL (ref 70–110)
GLUCOSE BLD STRIP.AUTO-MCNC: 114 MG/DL (ref 70–110)
GLUCOSE BLD STRIP.AUTO-MCNC: 117 MG/DL (ref 70–110)
GLUCOSE SERPL-MCNC: 128 MG/DL (ref 74–99)
GLUCOSE UR STRIP.AUTO-MCNC: NEGATIVE MG/DL
HBA1C MFR BLD: 6.9 % (ref 4.2–5.6)
HGB UR QL STRIP: ABNORMAL
KETONES UR QL STRIP.AUTO: NEGATIVE MG/DL
LEUKOCYTE ESTERASE UR QL STRIP.AUTO: ABNORMAL
MAGNESIUM SERPL-MCNC: 1.7 MG/DL (ref 1.6–2.6)
NITRITE UR QL STRIP.AUTO: NEGATIVE
P-R INTERVAL, ECG05: 178 MS
PH UR STRIP: 5 [PH] (ref 5–8)
PHOSPHATE SERPL-MCNC: 6 MG/DL (ref 2.5–4.9)
POTASSIUM SERPL-SCNC: 6.9 MMOL/L (ref 3.5–5.5)
PROT UR STRIP-MCNC: ABNORMAL MG/DL
PROT UR-MCNC: 47 MG/DL
Q-T INTERVAL, ECG07: 350 MS
QRS DURATION, ECG06: 88 MS
QTC CALCULATION (BEZET), ECG08: 444 MS
RBC #/AREA URNS HPF: ABNORMAL /HPF (ref 0–5)
SODIUM SERPL-SCNC: 141 MMOL/L (ref 136–145)
SODIUM UR-SCNC: 36 MMOL/L (ref 20–110)
SP GR UR REFRACTOMETRY: 1.01 (ref 1–1.03)
UROBILINOGEN UR QL STRIP.AUTO: 0.2 EU/DL (ref 0.2–1)
VENTRICULAR RATE, ECG03: 97 BPM
WBC URNS QL MICRO: ABNORMAL /HPF (ref 0–4)

## 2021-12-17 PROCEDURE — 74011250636 HC RX REV CODE- 250/636: Performed by: INTERNAL MEDICINE

## 2021-12-17 PROCEDURE — 82962 GLUCOSE BLOOD TEST: CPT

## 2021-12-17 PROCEDURE — 84100 ASSAY OF PHOSPHORUS: CPT

## 2021-12-17 PROCEDURE — 74011250637 HC RX REV CODE- 250/637: Performed by: INTERNAL MEDICINE

## 2021-12-17 PROCEDURE — 82570 ASSAY OF URINE CREATININE: CPT

## 2021-12-17 PROCEDURE — 76770 US EXAM ABDO BACK WALL COMP: CPT

## 2021-12-17 PROCEDURE — 80048 BASIC METABOLIC PNL TOTAL CA: CPT

## 2021-12-17 PROCEDURE — 93005 ELECTROCARDIOGRAM TRACING: CPT

## 2021-12-17 PROCEDURE — 74011000250 HC RX REV CODE- 250: Performed by: INTERNAL MEDICINE

## 2021-12-17 PROCEDURE — 74011250636 HC RX REV CODE- 250/636: Performed by: STUDENT IN AN ORGANIZED HEALTH CARE EDUCATION/TRAINING PROGRAM

## 2021-12-17 PROCEDURE — 83735 ASSAY OF MAGNESIUM: CPT

## 2021-12-17 PROCEDURE — 51798 US URINE CAPACITY MEASURE: CPT

## 2021-12-17 PROCEDURE — 51702 INSERT TEMP BLADDER CATH: CPT

## 2021-12-17 PROCEDURE — 74011000250 HC RX REV CODE- 250: Performed by: STUDENT IN AN ORGANIZED HEALTH CARE EDUCATION/TRAINING PROGRAM

## 2021-12-17 PROCEDURE — 2709999900 HC NON-CHARGEABLE SUPPLY

## 2021-12-17 PROCEDURE — 65660000000 HC RM CCU STEPDOWN

## 2021-12-17 PROCEDURE — 84300 ASSAY OF URINE SODIUM: CPT

## 2021-12-17 PROCEDURE — 74011636637 HC RX REV CODE- 636/637

## 2021-12-17 PROCEDURE — 74011000250 HC RX REV CODE- 250: Performed by: HOSPITALIST

## 2021-12-17 PROCEDURE — 84156 ASSAY OF PROTEIN URINE: CPT

## 2021-12-17 PROCEDURE — 81001 URINALYSIS AUTO W/SCOPE: CPT

## 2021-12-17 PROCEDURE — 74011250636 HC RX REV CODE- 250/636: Performed by: HOSPITALIST

## 2021-12-17 RX ORDER — ALBUTEROL SULFATE 0.83 MG/ML
20 SOLUTION RESPIRATORY (INHALATION)
Status: COMPLETED | OUTPATIENT
Start: 2021-12-17 | End: 2021-12-17

## 2021-12-17 RX ORDER — INSULIN LISPRO 100 [IU]/ML
INJECTION, SOLUTION INTRAVENOUS; SUBCUTANEOUS
Status: DISCONTINUED | OUTPATIENT
Start: 2021-12-17 | End: 2021-12-18

## 2021-12-17 RX ORDER — SODIUM BICARBONATE 1 MEQ/ML
50 SYRINGE (ML) INTRAVENOUS ONCE
Status: COMPLETED | OUTPATIENT
Start: 2021-12-17 | End: 2021-12-17

## 2021-12-17 RX ORDER — DEXTROSE 50 % IN WATER (D50W) INTRAVENOUS SYRINGE
25-50 AS NEEDED
Status: DISCONTINUED | OUTPATIENT
Start: 2021-12-17 | End: 2021-12-18 | Stop reason: SDUPTHER

## 2021-12-17 RX ORDER — SODIUM CHLORIDE 9 MG/ML
150 INJECTION, SOLUTION INTRAVENOUS ONCE
Status: DISCONTINUED | OUTPATIENT
Start: 2021-12-17 | End: 2021-12-17

## 2021-12-17 RX ORDER — CALCIUM GLUCONATE 94 MG/ML
1 INJECTION, SOLUTION INTRAVENOUS ONCE
Status: COMPLETED | OUTPATIENT
Start: 2021-12-17 | End: 2021-12-17

## 2021-12-17 RX ORDER — MAGNESIUM SULFATE 100 %
4 CRYSTALS MISCELLANEOUS AS NEEDED
Status: DISCONTINUED | OUTPATIENT
Start: 2021-12-17 | End: 2021-12-18 | Stop reason: SDUPTHER

## 2021-12-17 RX ADMIN — ALBUTEROL SULFATE 20 MG: 2.5 SOLUTION RESPIRATORY (INHALATION) at 10:05

## 2021-12-17 RX ADMIN — SODIUM BICARBONATE: 84 INJECTION, SOLUTION INTRAVENOUS at 09:57

## 2021-12-17 RX ADMIN — SODIUM CHLORIDE 1000 ML: 900 INJECTION, SOLUTION INTRAVENOUS at 08:36

## 2021-12-17 RX ADMIN — SODIUM ZIRCONIUM CYCLOSILICATE 15 G: 10 POWDER, FOR SUSPENSION ORAL at 12:00

## 2021-12-17 RX ADMIN — SODIUM ZIRCONIUM CYCLOSILICATE 15 G: 10 POWDER, FOR SUSPENSION ORAL at 16:19

## 2021-12-17 RX ADMIN — SODIUM BICARBONATE 50 MEQ: 84 INJECTION, SOLUTION INTRAVENOUS at 08:46

## 2021-12-17 RX ADMIN — CALCIUM GLUCONATE 1 G: 98 INJECTION, SOLUTION INTRAVENOUS at 08:47

## 2021-12-17 RX ADMIN — Medication 10 UNITS: at 00:12

## 2021-12-17 RX ADMIN — SODIUM ZIRCONIUM CYCLOSILICATE 15 G: 10 POWDER, FOR SUSPENSION ORAL at 20:18

## 2021-12-17 RX ADMIN — SODIUM BICARBONATE: 84 INJECTION, SOLUTION INTRAVENOUS at 02:05

## 2021-12-17 RX ADMIN — SODIUM ZIRCONIUM CYCLOSILICATE 15 G: 10 POWDER, FOR SUSPENSION ORAL at 09:14

## 2021-12-17 NOTE — ED PROVIDER NOTES
HPI Patient's  states that patient hasn't been eating well, has been sleeping more than normal, and \"isn't making sense\" for two weeks. No fever, chills, SOB, headache. Patient is Covid-10 vaccinated. Past Medical History:   Diagnosis Date    Abnormal WBC count 5/17/2016    Anemia     Bilateral shoulder pain 9/27/2016    Chondromalacia of both patellae     COPD (chronic obstructive pulmonary disease) ([facility] [address] 9/2015    mild-mod dz; Dr Suzy Mendoza    Diabetes [facility]) 2013    Diabetic eye exam ([facility] [address] 2016    Dilated cardiomyopathy ([facility] [address]     Dyslipidemia     Gout     Heart attack ([facility] [address]     Heart attack ([facility] [address]     Heart attack ([facility] [address] 10/18/2019    History of echocardiogram 11/14/2014    Mild LVE. EF 40%. Mild, diffuse hypk. Mild LAE.   Mild MR.      Hypercholesteremia 1/08/14    Hypertension 2000    Noncompliance with medications 2/16/2016    Obesity     Orthostatic hypotension 5/17/2016    Osteoarthritis of both knees     Pain management 04/01/2016    Dr. Omi Diamond     Popliteal cyst, bilateral      Vitamin D deficiency 10/16/14       Past Surgical History:   Procedure Laterality Date    HX HEART CATHETERIZATION      HX TUBAL LIGATION           Family History:   Problem Relation Age of Onset    Diabetes Mother     Hypertension Mother     Hypertension Father     Kidney Disease Maternal Aunt 48        Dialysis       Social History     Socioeconomic History    Marital status:      Spouse name: Not on file    Number of children: 3    Years of education: Not on file    Highest education level: Not on file   Occupational History    Occupation: retired   Tobacco Use    Smoking status: Former Smoker     Packs/day: 0.25     Years: 48.00     Pack years: 12.00     Types: Cigarettes     Quit date: 5/20/2018     Years since quitting: 3.5    Smokeless tobacco: Never Used   Substance and Sexual Activity    Alcohol use: No     Alcohol/week: 0.0 standard drinks    Drug use: No    Sexual activity: Yes     Partners: Male   Other Topics Concern     Service No    Blood Transfusions No    Caffeine Concern No    Occupational Exposure No    Hobby Hazards No    Sleep Concern No    Stress Concern No    Weight Concern No    Special Diet No    Back Care No    Exercise No    Bike Helmet No    Seat Belt Yes    Self-Exams Yes   Social History Narrative    Not on file     Social Determinants of Health     Financial Resource Strain:     Difficulty of Paying Living Expenses: Not on file   Food Insecurity:     Worried About Running Out of Food in the Last Year: Not on file    Ania of Food in the Last Year: Not on file   Transportation Needs:     Lack of Transportation (Medical): Not on file    Lack of Transportation (Non-Medical): Not on file   Physical Activity:     Days of Exercise per Week: Not on file    Minutes of Exercise per Session: Not on file   Stress:     Feeling of Stress : Not on file   Social Connections:     Frequency of Communication with Friends and Family: Not on file    Frequency of Social Gatherings with Friends and Family: Not on file    Attends Jewish Services: Not on file    Active Member of [address] [place] [address] [employer] or Organizations: Not on file    Attends Club or Organization Meetings: Not on file    Marital Status: Not on file   Intimate Partner Violence:     Fear of Current or Ex-Partner: Not on file    Emotionally Abused: Not on file    Physically Abused: Not on file    Sexually Abused: Not on file   Housing Stability:     Unable to Pay for Housing in the Last Year: Not on file    Number of [place] in the Last Year: Not on file    Unstable Housing in the Last Year: Not on file         ALLERGIES: Patient has no known allergies. Review of Systems   Constitutional: Negative. HENT: Negative. Eyes: Negative. Respiratory: Negative. Cardiovascular: Negative. Gastrointestinal: Negative. Endocrine: Negative. Genitourinary: Negative. Musculoskeletal: Negative. Skin: Negative. Allergic/Immunologic: Negative. Neurological: Negative. Hematological: Negative. Psychiatric/Behavioral: Negative. All other systems reviewed and are negative. Vitals:    12/16/21 2036   BP: (!) 117/59   Pulse: (!) 115   Resp: 18   Temp: 97.6 °F (36.4 °C)   SpO2: 98%   Weight: 99.8 kg (220 lb)   Height: 5' 4\" (1.626 m)            Physical Exam  Vitals and nursing note reviewed. Constitutional:       General: She is not in acute distress. Appearance: She is well-developed. She is not diaphoretic. HENT:      Head: Normocephalic. Right Ear: External ear normal.      Left Ear: External ear normal.      Mouth/Throat:      Pharynx: No oropharyngeal exudate. Eyes:      General: No scleral icterus. Right eye: No discharge. Left eye: No discharge. Conjunctiva/sclera: Conjunctivae normal.      Pupils: Pupils are equal, round, and reactive to light. Neck:      Thyroid: No thyromegaly. Vascular: No JVD. Trachea: No tracheal deviation. Cardiovascular:      Rate and Rhythm: Normal rate and regular rhythm. Heart sounds: Normal heart sounds. No murmur heard. No friction rub. No gallop. Pulmonary:      Effort: Pulmonary effort is normal. No respiratory distress. Breath sounds: Normal breath sounds. No stridor. No wheezing or rales. Chest:      Chest wall: No tenderness. Abdominal:      General: Bowel sounds are normal. There is no distension. Palpations: Abdomen is soft. There is no mass. Tenderness: There is no abdominal tenderness. There is no guarding or rebound. Musculoskeletal:         General: No tenderness. Normal range of motion. Cervical back: Normal range of motion and neck supple. Lymphadenopathy:      Cervical: No cervical adenopathy. Skin:     General: Skin is warm and dry. Coloration: Skin is not pale. Findings: No erythema or rash.    Neurological: Mental Status: She is alert and oriented to person, place, and time. Cranial Nerves: No cranial nerve deficit. Motor: No abnormal muscle tone. Coordination: Coordination normal.      Deep Tendon Reflexes: Reflexes normal.          MDM  Number of Diagnoses or Management Options     Amount and/or Complexity of Data Reviewed  Clinical lab tests: ordered and reviewed  Tests in the radiology section of CPT®: ordered and reviewed      ED Course as of 12/17/21 0004   Thu Dec 16, 2021   2324 Troponin-High Sensitivity: 28 [KB]      ED Course User Index  [KB] Damien Browning MD     Tests: Interpreted by me    CT scan of head: Interpreted by me    Urinalysis: Interpreted by me    Procedures    Critical Care Time:  The services I provided to this patient were to treat and/or prevent clinically significant deterioration that could result in the failure of one or more body systems and/or organ systems due to acute renal failure. Services included the following:  -reviewing nursing notes and old charts  -vital sign assessments  -direct patient care  -medication orders and management  -interpreting and reviewing diagnostic studies/labs  -re-evaluations  -documentation time    Aggregate critical care time was 35 minutes, which includes only time during which I was engaged in work directly related to the patient's care as described above, whether I was at bedside or elsewhere in the Emergency Department. It did not include time spent performing other reported procedures or the services of residents, students, nurses, or advance practice providers. Dx: acute renal failure    Consultation:  Anish Cohn MD   He accepted patient for admission to telementry. Patient started on IV fluids. Patient treated with IV insulin for her hyperkalemia and IV fluids. 12:05 AM.critic      Dictation disclaimer:  Please note that this dictation was completed with [employer], the [facility] [employer] voice recognition software.   Quite often unanticipated grammatical, syntax, homophones, and other interpretive errors are inadvertently transcribed by the computer software. Please disregard these errors. Please excuse any errors that have escaped final proofreading.

## 2021-12-17 NOTE — PROGRESS NOTES
Patient back from CT. Placed on cardiac monitor. Awake and alert, oriented to self and situation. Per spouse states she has not been eating well for past 2 weeks. Saline lock obtained and labs, specimen sent to lab.

## 2021-12-17 NOTE — ED TRIAGE NOTES
Patient awake and oriented x2 to self, place, and situation, believes the month is January.  states that patient sometimes \"says things that don't make sense to me\" and when asked gives the example that he told patient to eat something and she said, \"that's not good for me. \"

## 2021-12-17 NOTE — PROGRESS NOTES
Bladder scan done. No residual urine in bladder. Patient emptied her bladder earlier but was unable to obtain urine specimen. Patient to be admitted.

## 2021-12-17 NOTE — ED NOTES
Received report from Cristopher Ernandez, [facility].

## 2021-12-17 NOTE — ED NOTES
Assisted to bathroom to void; noted gait unsteady. Assisted back to bed and [place] external urinary system applied. Patient placed on falls precautions. Fall risk band applied and yellow slip resistant socks applied. Instructed to call for assist OOB. Patient verbalized understanding of. Frequent toileting in progress. Side rails up x 2. Bed in low position and wheels locked. Call bell within reach. Comfort addressed. Plan of care reviewed/updated with patient and or family members. Will continue to monitor.

## 2021-12-17 NOTE — ED NOTES
Charge nurse notified of patient's acuity level, chief complaint. Patient will be placed in next available room.

## 2021-12-17 NOTE — ED TRIAGE NOTES
Patient's  states that patient hasn't been eating well, has been sleeping more than normal, and \"isn't making sense\" for two weeks. Both patient and  deny fever, patient c/o chills.

## 2021-12-17 NOTE — PROGRESS NOTES
Pt still at [facility] er  Reviewed chart and discussed with er physician  Acute/crf stage 3,urinary retention,dehydration,  Frye cath  Stop enteresto  Continue ivf,but add bicarb and decrease rate due to hx of chf  Ordered calcium,bicarb,insulin and lokelma for hyperkalemia

## 2021-12-17 NOTE — ROUTINE PROCESS
TRANSFER - OUT REPORT:    Verbal report given to Carlos España RN(name) on [employer]  being transferred to 4N Room 461(unit) for routine progression of care       Report consisted of patients Situation, Background, Assessment and   Recommendations(SBAR). Information from the following report(s) SBAR, ED Summary, [facility] and Recent Results was reviewed with the receiving nurse. Lines:   Peripheral IV 12/16/21 Right Antecubital (Active)       Peripheral IV 12/17/21 Posterior;Right Hand (Active)        Opportunity for questions and clarification was provided.       Patient transported with:  Medical transport

## 2021-12-17 NOTE — ED NOTES
Patient signed out to be admitted for acute kidney injury. Talk with the patient she states that she has had difficulty urinating and seems to be having concerning findings of overflow incontinence. Will obtain ultrasound to look for fluid overload. We will also repeat BMP to see that potassium improved after treatment. Repeat potassium came back more elevated at 6.9. Patient will be given fluids and a repeat dose. Thankfully nephrology already following the patient and changes orders to be more appropriate for patient care. Bedside ultrasound does show urinary retention with a bladder volume of 638. Therefore Frye was placed.

## 2021-12-17 NOTE — PROGRESS NOTES
Problem: Falls - Risk of  Goal: *Absence of Falls  Description: Document Floyd Ronal Fall Risk and appropriate interventions in the flowsheet. Outcome: Progressing Towards Goal  Note: Fall Risk Interventions:  Mobility Interventions: Bed/chair exit alarm,Patient to call before getting OOB,PT Consult for mobility concerns    Mentation Interventions: Bed/chair exit alarm,Reorient patient    Medication Interventions: Teach patient to arise slowly,Patient to call before getting OOB,Bed/chair exit alarm    Elimination Interventions: Bed/chair exit alarm,Call light in reach,Stay With Me (per policy)              Problem: Patient Education: Go to Patient Education Activity  Goal: Patient/Family Education  Outcome: Progressing Towards Goal     Problem: Diabetes Self-Management  Goal: *Disease process and treatment process  Description: Define diabetes and identify own type of diabetes; list 3 options for treating diabetes. Outcome: Progressing Towards Goal  Goal: *Incorporating nutritional management into lifestyle  Description: Describe effect of type, amount and timing of food on blood glucose; list 3 methods for planning meals. Outcome: Progressing Towards Goal  Goal: *Incorporating physical activity into lifestyle  Description: State effect of exercise on blood glucose levels. Outcome: Progressing Towards Goal  Goal: *Developing strategies to promote health/change behavior  Description: Define the ABC's of diabetes; identify appropriate screenings, schedule and personal plan for screenings. Outcome: Progressing Towards Goal  Goal: *Using medications safely  Description: State effect of diabetes medications on diabetes; name diabetes medication taking, action and side effects. Outcome: Progressing Towards Goal  Goal: *Monitoring blood glucose, interpreting and using results  Description: Identify recommended blood glucose targets  and personal targets.   Outcome: Progressing Towards Goal  Goal: *Prevention, detection, treatment of acute complications  Description: List symptoms of hyper- and hypoglycemia; describe how to treat low blood sugar and actions for lowering  high blood glucose level. Outcome: Progressing Towards Goal  Goal: *Prevention, detection and treatment of chronic complications  Description: Define the natural course of diabetes and describe the relationship of blood glucose levels to long term complications of diabetes. Outcome: Progressing Towards Goal  Goal: *Developing strategies to address psychosocial issues  Description: Describe feelings about living with diabetes; identify support needed and support network  Outcome: Progressing Towards Goal  Goal: *Insulin pump training  Outcome: Progressing Towards Goal  Goal: *Sick day guidelines  Outcome: Progressing Towards Goal  Goal: *Patient Specific Goal (EDIT GOAL, INSERT TEXT)  Outcome: Progressing Towards Goal     Problem: Patient Education: Go to Patient Education Activity  Goal: Patient/Family Education  Outcome: Progressing Towards Goal     Problem: Pressure Injury - Risk of  Goal: *Prevention of pressure injury  Description: Document Vladislav Scale and appropriate interventions in the flowsheet.   Outcome: Progressing Towards Goal  Note: Pressure Injury Interventions:  Sensory Interventions: Assess changes in LOC,Minimize linen layers,Maintain/enhance activity level,Keep linens dry and wrinkle-free    Moisture Interventions: Absorbent underpads,Minimize layers,Limit adult briefs,Maintain skin hydration (lotion/cream)    Activity Interventions: Assess need for specialty bed,Increase time out of bed,Pressure redistribution bed/mattress(bed type)    Mobility Interventions: HOB 30 degrees or less,Pressure redistribution bed/mattress (bed type)    Nutrition Interventions: Document food/fluid/supplement intake                     Problem: Patient Education: Go to Patient Education Activity  Goal: Patient/Family Education  Outcome: Progressing Towards Goal

## 2021-12-18 PROBLEM — N17.9 ACUTE RENAL FAILURE (ARF) (HCC): Status: ACTIVE | Noted: 2021-12-18

## 2021-12-18 LAB
ANION GAP SERPL CALC-SCNC: 8 MMOL/L (ref 3–18)
BASOPHILS # BLD: 0 K/UL (ref 0–0.1)
BASOPHILS NFR BLD: 0 % (ref 0–2)
BUN SERPL-MCNC: 112 MG/DL (ref 7–18)
BUN/CREAT SERPL: 39 (ref 12–20)
CALCIUM SERPL-MCNC: 8.2 MG/DL (ref 8.5–10.1)
CHLORIDE SERPL-SCNC: 115 MMOL/L (ref 100–111)
CO2 SERPL-SCNC: 18 MMOL/L (ref 21–32)
CREAT SERPL-MCNC: 2.84 MG/DL (ref 0.6–1.3)
DIFFERENTIAL METHOD BLD: ABNORMAL
EOSINOPHIL # BLD: 0.1 K/UL (ref 0–0.4)
EOSINOPHIL NFR BLD: 2 % (ref 0–5)
ERYTHROCYTE [DISTWIDTH] IN BLOOD BY AUTOMATED COUNT: 14.5 % (ref 11.6–14.5)
FERRITIN SERPL-MCNC: 365 NG/ML (ref 8–388)
GLUCOSE BLD STRIP.AUTO-MCNC: 106 MG/DL (ref 70–110)
GLUCOSE BLD STRIP.AUTO-MCNC: 118 MG/DL (ref 70–110)
GLUCOSE BLD STRIP.AUTO-MCNC: 119 MG/DL (ref 70–110)
GLUCOSE BLD STRIP.AUTO-MCNC: 99 MG/DL (ref 70–110)
GLUCOSE SERPL-MCNC: 84 MG/DL (ref 74–99)
HCT VFR BLD AUTO: 28.4 % (ref 35–45)
HGB BLD-MCNC: 8.4 G/DL (ref 12–16)
IMM GRANULOCYTES # BLD AUTO: 0 K/UL (ref 0–0.04)
IMM GRANULOCYTES NFR BLD AUTO: 1 % (ref 0–0.5)
IRON SATN MFR SERPL: 60 % (ref 20–50)
IRON SERPL-MCNC: 107 UG/DL (ref 50–175)
LYMPHOCYTES # BLD: 1 K/UL (ref 0.9–3.6)
LYMPHOCYTES NFR BLD: 23 % (ref 21–52)
MAGNESIUM SERPL-MCNC: 1.5 MG/DL (ref 1.6–2.6)
MCH RBC QN AUTO: 28.1 PG (ref 24–34)
MCHC RBC AUTO-ENTMCNC: 29.6 G/DL (ref 31–37)
MCV RBC AUTO: 95 FL (ref 78–100)
MONOCYTES # BLD: 0.5 K/UL (ref 0.05–1.2)
MONOCYTES NFR BLD: 11 % (ref 3–10)
NEUTS SEG # BLD: 2.8 K/UL (ref 1.8–8)
NEUTS SEG NFR BLD: 63 % (ref 40–73)
NRBC # BLD: 0.02 K/UL (ref 0–0.01)
NRBC BLD-RTO: 0.5 PER 100 WBC
PHOSPHATE SERPL-MCNC: 4.6 MG/DL (ref 2.5–4.9)
PLATELET # BLD AUTO: 315 K/UL (ref 135–420)
PMV BLD AUTO: 10.2 FL (ref 9.2–11.8)
POTASSIUM SERPL-SCNC: 4.7 MMOL/L (ref 3.5–5.5)
RBC # BLD AUTO: 2.99 M/UL (ref 4.2–5.3)
SODIUM SERPL-SCNC: 141 MMOL/L (ref 136–145)
TIBC SERPL-MCNC: 178 UG/DL (ref 250–450)
WBC # BLD AUTO: 4.4 K/UL (ref 4.6–13.2)

## 2021-12-18 PROCEDURE — 84100 ASSAY OF PHOSPHORUS: CPT

## 2021-12-18 PROCEDURE — 83735 ASSAY OF MAGNESIUM: CPT

## 2021-12-18 PROCEDURE — 82728 ASSAY OF FERRITIN: CPT

## 2021-12-18 PROCEDURE — 74011250636 HC RX REV CODE- 250/636: Performed by: INTERNAL MEDICINE

## 2021-12-18 PROCEDURE — 74011000250 HC RX REV CODE- 250: Performed by: FAMILY MEDICINE

## 2021-12-18 PROCEDURE — 65270000029 HC RM PRIVATE

## 2021-12-18 PROCEDURE — 83550 IRON BINDING TEST: CPT

## 2021-12-18 PROCEDURE — 36415 COLL VENOUS BLD VENIPUNCTURE: CPT

## 2021-12-18 PROCEDURE — 85025 COMPLETE CBC W/AUTO DIFF WBC: CPT

## 2021-12-18 PROCEDURE — 82962 GLUCOSE BLOOD TEST: CPT

## 2021-12-18 PROCEDURE — 99223 1ST HOSP IP/OBS HIGH 75: CPT | Performed by: FAMILY MEDICINE

## 2021-12-18 PROCEDURE — 82784 ASSAY IGA/IGD/IGG/IGM EACH: CPT

## 2021-12-18 PROCEDURE — 74011000250 HC RX REV CODE- 250: Performed by: INTERNAL MEDICINE

## 2021-12-18 PROCEDURE — 74011250636 HC RX REV CODE- 250/636: Performed by: FAMILY MEDICINE

## 2021-12-18 PROCEDURE — 80048 BASIC METABOLIC PNL TOTAL CA: CPT

## 2021-12-18 PROCEDURE — 74011250637 HC RX REV CODE- 250/637: Performed by: FAMILY MEDICINE

## 2021-12-18 RX ORDER — GUAIFENESIN 100 MG/5ML
162 LIQUID (ML) ORAL DAILY
Status: DISCONTINUED | OUTPATIENT
Start: 2021-12-19 | End: 2021-12-20 | Stop reason: HOSPADM

## 2021-12-18 RX ORDER — MAGNESIUM SULFATE 1 G/100ML
1 INJECTION INTRAVENOUS ONCE
Status: COMPLETED | OUTPATIENT
Start: 2021-12-18 | End: 2021-12-18

## 2021-12-18 RX ORDER — ARFORMOTEROL TARTRATE 15 UG/2ML
15 SOLUTION RESPIRATORY (INHALATION)
Status: DISCONTINUED | OUTPATIENT
Start: 2021-12-18 | End: 2021-12-20 | Stop reason: HOSPADM

## 2021-12-18 RX ORDER — INSULIN GLARGINE 100 [IU]/ML
15 INJECTION, SOLUTION SUBCUTANEOUS DAILY
Status: DISCONTINUED | OUTPATIENT
Start: 2021-12-19 | End: 2021-12-20 | Stop reason: HOSPADM

## 2021-12-18 RX ORDER — ACETAMINOPHEN 325 MG/1
650 TABLET ORAL
Status: DISCONTINUED | OUTPATIENT
Start: 2021-12-18 | End: 2021-12-20 | Stop reason: HOSPADM

## 2021-12-18 RX ORDER — ONDANSETRON 2 MG/ML
4 INJECTION INTRAMUSCULAR; INTRAVENOUS
Status: DISCONTINUED | OUTPATIENT
Start: 2021-12-18 | End: 2021-12-20 | Stop reason: HOSPADM

## 2021-12-18 RX ORDER — HEPARIN SODIUM 5000 [USP'U]/ML
5000 INJECTION, SOLUTION INTRAVENOUS; SUBCUTANEOUS EVERY 8 HOURS
Status: DISCONTINUED | OUTPATIENT
Start: 2021-12-18 | End: 2021-12-20 | Stop reason: HOSPADM

## 2021-12-18 RX ORDER — CALCITRIOL 0.25 UG/1
0.25 CAPSULE ORAL DAILY
Status: DISCONTINUED | OUTPATIENT
Start: 2021-12-19 | End: 2021-12-20 | Stop reason: HOSPADM

## 2021-12-18 RX ORDER — ATORVASTATIN CALCIUM 40 MG/1
80 TABLET, FILM COATED ORAL DAILY
Status: DISCONTINUED | OUTPATIENT
Start: 2021-12-19 | End: 2021-12-20 | Stop reason: HOSPADM

## 2021-12-18 RX ORDER — FLUTICASONE PROPIONATE 50 MCG
2 SPRAY, SUSPENSION (ML) NASAL DAILY
Status: DISCONTINUED | OUTPATIENT
Start: 2021-12-19 | End: 2021-12-20 | Stop reason: HOSPADM

## 2021-12-18 RX ORDER — ACETAMINOPHEN 650 MG/1
650 SUPPOSITORY RECTAL
Status: DISCONTINUED | OUTPATIENT
Start: 2021-12-18 | End: 2021-12-20 | Stop reason: HOSPADM

## 2021-12-18 RX ORDER — SODIUM CHLORIDE 0.9 % (FLUSH) 0.9 %
5-40 SYRINGE (ML) INJECTION EVERY 8 HOURS
Status: DISCONTINUED | OUTPATIENT
Start: 2021-12-18 | End: 2021-12-20 | Stop reason: HOSPADM

## 2021-12-18 RX ORDER — ONDANSETRON 4 MG/1
4 TABLET, ORALLY DISINTEGRATING ORAL
Status: DISCONTINUED | OUTPATIENT
Start: 2021-12-18 | End: 2021-12-20 | Stop reason: HOSPADM

## 2021-12-18 RX ORDER — LANOLIN ALCOHOL/MO/W.PET/CERES
400 CREAM (GRAM) TOPICAL 2 TIMES DAILY
Status: DISCONTINUED | OUTPATIENT
Start: 2021-12-18 | End: 2021-12-20 | Stop reason: HOSPADM

## 2021-12-18 RX ORDER — POLYETHYLENE GLYCOL 3350 17 G/17G
17 POWDER, FOR SOLUTION ORAL DAILY PRN
Status: DISCONTINUED | OUTPATIENT
Start: 2021-12-18 | End: 2021-12-20 | Stop reason: HOSPADM

## 2021-12-18 RX ORDER — INSULIN LISPRO 100 [IU]/ML
INJECTION, SOLUTION INTRAVENOUS; SUBCUTANEOUS
Status: DISCONTINUED | OUTPATIENT
Start: 2021-12-18 | End: 2021-12-20 | Stop reason: HOSPADM

## 2021-12-18 RX ORDER — TAMSULOSIN HYDROCHLORIDE 0.4 MG/1
0.4 CAPSULE ORAL DAILY
Status: DISCONTINUED | OUTPATIENT
Start: 2021-12-19 | End: 2021-12-20 | Stop reason: HOSPADM

## 2021-12-18 RX ORDER — IPRATROPIUM BROMIDE 0.5 MG/2.5ML
0.5 SOLUTION RESPIRATORY (INHALATION)
Status: DISCONTINUED | OUTPATIENT
Start: 2021-12-18 | End: 2021-12-19

## 2021-12-18 RX ORDER — CARVEDILOL 25 MG/1
25 TABLET ORAL 2 TIMES DAILY WITH MEALS
Status: DISCONTINUED | OUTPATIENT
Start: 2021-12-18 | End: 2021-12-20 | Stop reason: HOSPADM

## 2021-12-18 RX ORDER — DEXTROSE 50 % IN WATER (D50W) INTRAVENOUS SYRINGE
25-50 AS NEEDED
Status: DISCONTINUED | OUTPATIENT
Start: 2021-12-18 | End: 2021-12-20 | Stop reason: HOSPADM

## 2021-12-18 RX ORDER — MAGNESIUM SULFATE 100 %
4 CRYSTALS MISCELLANEOUS AS NEEDED
Status: DISCONTINUED | OUTPATIENT
Start: 2021-12-18 | End: 2021-12-20 | Stop reason: HOSPADM

## 2021-12-18 RX ORDER — SODIUM CHLORIDE 0.9 % (FLUSH) 0.9 %
5-40 SYRINGE (ML) INJECTION AS NEEDED
Status: DISCONTINUED | OUTPATIENT
Start: 2021-12-18 | End: 2021-12-20 | Stop reason: HOSPADM

## 2021-12-18 RX ADMIN — IPRATROPIUM BROMIDE 0.5 MG: 0.5 SOLUTION RESPIRATORY (INHALATION) at 18:28

## 2021-12-18 RX ADMIN — Medication 10 ML: at 14:00

## 2021-12-18 RX ADMIN — MAGNESIUM SULFATE HEPTAHYDRATE 1 G: 1 INJECTION, SOLUTION INTRAVENOUS at 10:00

## 2021-12-18 RX ADMIN — Medication 400 MG: at 20:34

## 2021-12-18 RX ADMIN — HEPARIN SODIUM 5000 UNITS: 5000 INJECTION INTRAVENOUS; SUBCUTANEOUS at 14:00

## 2021-12-18 RX ADMIN — HEPARIN SODIUM 5000 UNITS: 5000 INJECTION INTRAVENOUS; SUBCUTANEOUS at 22:00

## 2021-12-18 RX ADMIN — Medication 10 ML: at 22:01

## 2021-12-18 RX ADMIN — SODIUM BICARBONATE: 84 INJECTION, SOLUTION INTRAVENOUS at 00:56

## 2021-12-18 RX ADMIN — POLYETHYLENE GLYCOL 3350 17 G: 17 POWDER, FOR SOLUTION ORAL at 22:00

## 2021-12-18 RX ADMIN — ARFORMOTEROL TARTRATE 15 MCG: 15 SOLUTION RESPIRATORY (INHALATION) at 20:34

## 2021-12-18 RX ADMIN — SODIUM BICARBONATE: 84 INJECTION, SOLUTION INTRAVENOUS at 13:15

## 2021-12-18 NOTE — PROGRESS NOTES
RENAL DAILY PROGRESS NOTE    Patient: Benjamín Whitehead               Sex: female          DOA: 12/16/2021  8:58 PM        YOB: 1950      Age:  73 y.o.        LOS:  LOS: 1 day     Subjective:     Benjamín Whitehead is a 73 y.o.  who presents with Acute renal failure ([facility] [address] [N17.9]. Asked to evaluate for worsening renal failure,hx of crf stage 3,dm,chf ,on diuretics and entresto  Chief complains: Patient denies nausea, vomiting, chest pain, dizziness, shortness of breath or headache.  - Reviewed last 24 hrs events     Current Facility-Administered Medications   Medication Dose Route Frequency    magnesium sulfate 1 g/100 ml IVPB (premix or compounded)  1 g IntraVENous ONCE    insulin lispro (HUMALOG) injection   SubCUTAneous TIDAC    glucose chewable tablet 16 g  4 Tablet Oral PRN    glucagon (GLUCAGEN) injection 1 mg  1 mg IntraMUSCular PRN    dextrose (D50W) injection syrg 12.5-25 g  25-50 mL IntraVENous PRN    sodium bicarbonate (8.4%) 50 mEq in 0.45% sodium chloride 1,000 mL infusion   IntraVENous CONTINUOUS       Objective:     Visit Vitals  /69   Pulse 85   Temp 97.6 °F (36.4 °C)   Resp 20   Ht 5' 4\" (1.626 m)   Wt 99.5 kg (219 lb 6.4 oz)   SpO2 98%   BMI 37.66 kg/m²       Intake/Output Summary (Last 24 hours) at 12/18/2021 1039  Last data filed at 12/18/2021 0810  Gross per 24 hour   Intake 1314 ml   Output 2150 ml   Net -836 ml       Physical Examination:     RS: Chest is bilateral equal, no wheezing / rales / crackles  CVS: S1-S2 heard,  Abdomen: Soft, Non tender, Not distended, Positive bowel sounds, no organomegaly, no CVA / supra pubic tenderness  Extremities: No edema, no cyanosis, skin is warm on touch  CNS: Awake & follows commands, CN II-XII are grossly intact. HEENT: Head is atraumatic, PERRLA, conjunctiva pink & non icteric.  No JVD or carotid bruit       Data Review:      Labs:     Hematology:   Recent Labs     12/18/21  0219 12/16/21  2155   WBC 4.4* 6.5   HGB 8.4* 10.1*   HCT 28.4* 34.6*     Chemistry:   Recent Labs     12/18/21  0219 12/17/21  0808 12/17/21  0744 12/16/21  2155   *  --  149* 144*   CREA 2.84*  --  5.17* 6.26*   CA 8.2*  --  8.8 9.2   ALB  --   --   --  3.5   K 4.7  --  6.9* 6.3*     --  141 139   *  --  114* 112*   CO2 18*  --  14* 13*   PHOS 4.6 6.0*  --   --    GLU 84  --  128* 192*        Images:    XR (Most Recent). CXR reviewed by me and compared with previous CXR Results from Hospital Encounter encounter on 03/17/21    XR CHEST PORT    Narrative  Portable Chest    CPT CODE: 95525    HISTORY: Line placement. FINDINGS:    Comparison 10/22/2020    Single frontal view of the chest. Lordotic positioning. Left internal jugular  line tip at the proximal to mid SVC. Multiple wires overlie the patient. No  pneumothorax. Heart size and mediastinal contours within normal limits. No  effusion or focal lung consolidation. Impression  No pneumothorax after left CVL placement. Tip in the region of the proximal to  mid SVC. CT (Most Recent) Results from Hospital Encounter encounter on 12/16/21    CT HEAD WO CONT    Narrative  CT HEAD WO CONT: 12/16/2021 9:56 PM    CLINICAL INFORMATION  Altered mental status. COMPARISON  18 March 2021    TECHNIQUE  Axial CT images of the brain acquired. Sagittal and coronal reformations  performed. The following CT dose reduction techniques were utilized: automated exposure  control and/or adjustment of the mA and/or kV according to patient size, and the  use of iterative reconstruction technique    FINDINGS  INTRA-AXIAL STRUCTURES  Cerebral cortex: Generalized cortical atrophy present. No evidence of  intracranial mass or hemorrhage. Normal gray-white differentiation. Moderate  confluent hypodensities within the periventricular and subcortical white matter  bilaterally. Ventricular system: Normal in size and morphology for the patient's age.   Midline shift: No evidence of midline shift.  Sella: The sella turcica and hypothalamic region are within normal limits. Cerebellum: The cerebellum is normal in appearance. Normal gray-white  differentiation. Brainstem: The brainstem is normal in appearance. The prepontine cistern is  within normal limits. Normal gray-white differentiation. EXTRA-AXIAL STRUCTURES  Normal in size and morphology for the patient's age. VASCULATURE  Normal.    EXTRACRANIAL SOFT TISSUE STRUCTURES  Orbits: The orbits and globes are within normal limits. Sinuses: Patent paranasal sinuses and mastoid air cells bilaterally. Visualized upper cervical spine: Normal.    OSSEOUS STRUCTURES  No evidence of acute fracture. Impression  No intracranial hemorrhage or mass effect. Moderate sequela of chronic microvascular ischemic disease. EKG Results for orders placed or performed in visit on 08/23/18   AMB POC EKG ROUTINE W/ 12 LEADS, INTER & REP     Status: None    Impression    See progress note. I have personally reviewed the old medical records and patient's labs    Plan / Recommendation:      1. Acute/crf stage 3 related to dehydration,diuretics,entresto and urinary retention. soto cath placed in er.continue ivf with bicarb. improving  2.hyperkalemia,resolved  3.acidosis,improving  4.anemia ,check spep,iron panel    D/w Dr. Yordan Skinner MD  Nephrology  12/18/2021

## 2021-12-18 NOTE — ROUTINE PROCESS
Bedside and Verbal shift change report given to FLORENCIA Hillman (oncoming nurse) by Indigo Segovia RN (offgoing nurse). Report included the following information SBAR, Kardex, MAR and Recent Results. SITUATION:  Code Status: Prior  Reason for Admission: Acute renal failure ([facility] [address] [N17.9]  Hospital day: 1  Problem List:       Hospital Problems  Date Reviewed: 4/26/2021    None          BACKGROUND:   Past Medical History:   Past Medical History:   Diagnosis Date    Abnormal WBC count 5/17/2016    Anemia     Bilateral shoulder pain 9/27/2016    Chondromalacia of both patellae     COPD (chronic obstructive pulmonary disease) ([facility] [address] 9/2015    mild-mod dz; Dr Laura De Anda    Diabetes [facility]) 2013    Diabetic eye exam ([facility] [address] 2016    Dilated cardiomyopathy ([facility] [address]     Dyslipidemia     Gout     Heart attack ([facility] [address]     Heart attack ([facility] [address]     Heart attack ([facility] [address] 10/18/2019    History of echocardiogram 11/14/2014    Mild LVE. EF 40%. Mild, diffuse hypk. Mild LAE.   Mild MR.      Hypercholesteremia 1/08/14    Hypertension 2000    Noncompliance with medications 2/16/2016    Obesity     Orthostatic hypotension 5/17/2016    Osteoarthritis of both knees     Pain management 04/01/2016    Dr. Wolfgang Parada Popliteal cyst, bilateral      Vitamin D deficiency 10/16/14      Patient taking anticoagulants no    Patient has a defibrillator: no    History of shots YES for example, flu, pneumonia, tetanus   Isolation History NO for example, MRSA, CDiff    ASSESSMENT:  Changes in Assessment Throughout Shift: NONE  Significant Changes in 24 hours (for example, RR/code, fall)  Patient has Central Line: no   Patient has Frye Cath: yes Reasons if yes: Urinary Retention   Mobility Issues  PT  IV Patency  OR Checklist  Pending Tests    Last Vitals:  Vitals w/ MEWS Score (last day)     Date/Time MEWS Score Pulse Resp Temp BP Level of Consciousness SpO2    12/18/21 0435 1 96 18 98.4 °F (36.9 °C) 121/55 Alert (0) 96 %    12/17/21 2334 1 87 18 98.1 °F (36.7 °C) 114/61 Alert (0) 98 %    12/17/21 2116 1 92 20 98.1 °F (36.7 °C) 106/68 Alert (0) 97 %    12/17/21 2016 1 90 20 97 °F (36.1 °C) 117/64 Alert (0) 98 %    12/17/21 1541 1 86 18 97.6 °F (36.4 °C) 116/64 Alert (0) 99 %    12/17/21 1331 1 82 18 97.9 °F (36.6 °C) 109/53 Alert (0) 97 %    12/17/21 1255 -- 91 -- -- 109/53 -- 99 %    12/17/21 1203 -- -- -- -- 110/71 -- --    12/17/21 1122 -- 101 -- -- 123/51 -- 100 %    12/17/21 1022 -- 95 -- -- 146/99 -- 94 %    12/17/21 1020 -- 99 -- -- -- -- 100 %    12/17/21 0952 -- 80 -- -- 138/60 -- --    12/17/21 0922 -- 86 -- -- 126/59 -- --    12/17/21 0852 -- 87 -- -- 118/54 -- 99 %    12/17/21 0837 -- 94 -- -- 122/51 -- 100 %    12/17/21 0623 -- 86 -- -- 130/58 -- 99 %    12/17/21 0553 -- 88 -- -- 114/70 -- 92 %    12/17/21 0523 -- 93 -- -- 111/46 -- 97 %    12/17/21 0500 -- 96 -- -- 94/53 Alert (0) 97 %    12/17/21 0400 -- 101 -- -- 131/60 -- --    12/17/21 0300 -- 97 -- -- 122/52 -- 98 %    12/17/21 0230 -- 94 -- -- 120/57 -- 97 %    12/17/21 0200 -- 90 -- -- 115/51 -- 97 %    12/17/21 0130 -- 91 -- -- 115/55 -- --    12/17/21 0100 -- 102 -- -- 100/51 -- --    12/17/21 0030 -- 102 18 -- 108/45 -- --    12/17/21 0000 -- -- -- 97.8 °F (36.6 °C) 116/50 New Confusion or Agitation (1) 99 %        PAIN    Pain Assessment    Pain Intensity 1: 0 (12/18/21 0421)              Patient Stated Pain Goal: 0  Intervention effective: N/A  Time of last intervention: N/A Reassessment Completed: yes   Other actions taken for pain: Distraction    Last 3 Weights:  Last 3 Recorded Weights in this Encounter    12/16/21 2036 12/18/21 0011   Weight: 99.8 kg (220 lb) 99.5 kg (219 lb 6.4 oz)   Weight change: -0.272 kg (-9.6 oz)    INTAKE/OUPUT    Current Shift: No intake/output data recorded. Last three shifts: 12/16 1901 - 12/18 0700  In: 4700 [P.O.:114;  I.V.:1200]  Out: 1750 [Urine:1750]    RECOMMENDATIONS AND DISCHARGE PLANNING  Patient needs and requests: Assistance with ADL's    Pending tests/procedures: labs     Discharge plan for patient: Home    Discharge planning Needs or Barriers: None    Estimated Discharge Date: 12/21/2021 Posted on Whiteboard in Patients Room: yes       \"HEALS\" SAFETY CHECK  A safety check occurred in the patient's room between off going nurse and oncoming nurse listed above. The safety check included the below items:    H  High Alert Medications Verify all high alert medication drips (heparin, PCA, etc.)  E  Equipment Suction is set up for ALL patients (with rosanne)  Red plugs utilized for all equipment (IV pumps, etc.)  WOWs wiped down at end of shift. Room stocked with oxygen, suction, and other unit-specific supplies  A  Alarms Bed alarm is set for fall risk patients  Ensure chair alarm is in place and activated if patient is up in a chair  L  Lines Check IV for any infiltration  Frye bag is empty if patient has a Frye   Tubing and IV bags are labeled  S  Safety  Room is clean, patient is clean, and equipment is clean. Hallways are clear from equipment besides carts. Fall bracelet on for fall risk patients  Ensure room is clear and free of clutter  Suction is set up for ALL patients (with rosanne)  Hallways are clear from equipment besides carts.    Isolation precautions followed, supplies available outside room, sign posted    Len Granado RN

## 2021-12-18 NOTE — H&P
History & Physical      Patient: Kathia Carvajal MRN: [id number]  CSN: [id number]    YOB: 1950  Age: 73 y.o. Sex: female      DOA: 12/16/2021    Chief Complaint:   Chief Complaint   Patient presents with    Anorexia    Altered mental status          HPI:     Kathia Carvajal is a 73 y.o. female with PMHx of chronic systolic and diastolic CHF, type II DM, CAD s/p stenting, HTN, HLD, COPD, gout and obesity who presented to [address] ED with complaints of generally feeling poorly and a decreased appetite. Ms. Jorgito Hart reports that she began feeling bad about a week ago and has since had a decreased appetite with very little oral intake. She had 1 episode of lightheadedness and has also had some urinary frequency, but denies any further complaints including headache, vision problems, chest pain, shortness of breath, abdominal pain, nausea, vomiting, diarrhea, dysuria, hematuria, or decreased urine output. She reports being a former smoker and quit 2 years ago, and does not use any alcohol or recreational drugs. She has received the Covid-19 Moderna vaccine x3. In the ED, she was tachycardic with heart rate in the 110s, and had other vitals that were overall reassuring. Labs were notable for Hgb 10.1, potassium 6.3 letter is a 6.9, bicarb 13, glucose 192, >149, and creatinine 6.26 (baseline 1.5), alk phos 193 and HbA1c 6.9. A UA showed small blood, negative nitrates, trace leuk esterase and was overall reassuring. EKG showed sinus rhythm at 97 bpm.  A CT head was negative and a retroperitoneal ultrasound showed no hydronephrosis and findings suggestive of medical renal disease. She did have some urinary retention while at [address] and a Frye was placed. Nephrology was consulted and she was treated with IV insulin, calcium, bicarb and Tawanda  and started on IVF. She has been transferred to [facility] [place]   She was seen earlier today after her arrival and was fairly comfortable in bed. She reported the history detailed above to me and reported that she was feeling a little better. She is now continued on a bicarb drip and admitted for CARA on CKD stage III. Past Medical History:   Diagnosis Date    Abnormal WBC count 5/17/2016    Anemia     Bilateral shoulder pain 9/27/2016    Chondromalacia of both patellae     COPD (chronic obstructive pulmonary disease) ([address] 9/2015    mild-mod dz; Dr Da Sheffield    Diabetes [facility]) 2013    Diabetic eye exam ([facility] [address] 2016    Dilated cardiomyopathy ([facility] [address]     Dyslipidemia     Gout     Heart attack ([facility] [address]     Heart attack ([facility] [address]     Heart attack ([facility] [address] 10/18/2019    History of echocardiogram 11/14/2014    Mild LVE. EF 40%. Mild, diffuse hypk. Mild LAE.   Mild MR.      Hypercholesteremia 1/08/14    Hypertension 2000    Noncompliance with medications 2/16/2016    Obesity     Orthostatic hypotension 5/17/2016    Osteoarthritis of both knees     Pain management 04/01/2016    Dr. Mendez Lumikala Popliteal cyst, bilateral      Vitamin D deficiency 10/16/14       Past Surgical History:   Procedure Laterality Date    HX HEART CATHETERIZATION      HX TUBAL LIGATION         Family History   Problem Relation Age of Onset    Diabetes Mother     Hypertension Mother     Hypertension Father     Kidney Disease Maternal Aunt 48        Dialysis       Social History     Socioeconomic History    Marital status:     Number of children: 3   Occupational History    Occupation: retired   Tobacco Use    Smoking status: Former Smoker     Packs/day: 0.25     Years: 48.00     Pack years: 12.00     Types: Cigarettes     Quit date: 5/20/2018     Years since quitting: 3.5    Smokeless tobacco: Never Used   Substance and Sexual Activity    Alcohol use: No     Alcohol/week: 0.0 standard drinks    Drug use: No    Sexual activity: Yes     Partners: Male   Other Topics Concern     Service No    Blood Transfusions No    Caffeine Concern No    Occupational Exposure No    Hobby Hazards No    Sleep Concern No    Stress Concern No    Weight Concern No    Special Diet No    Back Care No    Exercise No    Bike Helmet No    Seat Belt Yes    Self-Exams Yes       Prior to Admission medications    Medication Sig Start Date End Date Taking? Authorizing Provider   hydrOXYzine HCL (ATARAX) 25 mg tablet Take 1-2 tablets by mouth every 6 hours as needed. 4/2/21  Yes Geovani Housre, DO   predniSONE (STERAPRED DS) 10 mg dose pack Take as directed 4/2/21  Yes Geovani Houser Oven, DO   furosemide (LASIX) 40 mg tablet Take 1 Tab by mouth daily. 3/23/21  Yes Harvinder Burton MD   calcitRIOL (ROCALTROL) 0.25 mcg capsule Take 1 Cap by mouth daily. 3/24/21  Yes Harvinder Burton MD   cyanocobalamin 1,000 mcg tablet Take 1 Tab by mouth daily. 3/24/21  Yes Harvinder Burton MD   albuterol-ipratropium (DUO-NEB) 2.5 mg-0.5 mg/3 ml nebu 3 mL by Nebulization route every six (6) hours as needed for Wheezing. 3/23/21  Yes Harvinder Burton MD   atorvastatin (LIPITOR) 80 mg tablet Take 1 Tab by mouth daily. 1/25/21  Yes Camilla Fraire MD   carvediloL (COREG) 25 mg tablet Take 1 Tab by mouth two (2) times daily (with meals). 1/25/21  Yes Camilla Fraire MD   tiZANidine (ZANAFLEX) 4 mg tablet Take 4 mg by mouth two (2) times a day. Yes Provider, Historical   lidocaine (Lidoderm) 5 % Apply patch to the affected area for 12 hours a day and remove for 12 hours a day. 10/22/20  Yes LORE Spence   pantoprazole (PROTONIX) 40 mg tablet Take 1 Tab by mouth daily. 10/27/19  Yes Conner Dumont DO   albuterol (PROVENTIL HFA, VENTOLIN HFA, PROAIR HFA) 90 mcg/actuation inhaler Take 2 Puffs by inhalation every four (4) hours as needed for Wheezing. 9/30/19  Yes Margot Lopez MD   aspirin 81 mg chewable tablet Take 2 Tabs by mouth daily.  7/19/19  Yes Black, Myria G, NP   insulin detemir (LEVEMIR) 100 unit/mL injection 15 units daily for diabetes 17  Yes Iqra Irizarry, [address]   desloratadine (CLARINEX) 5 mg tablet Take 1 Tab by mouth daily as needed for Allergies. 17  Yes Iqra Irizarry, DNP   mometasone (NASONEX) 50 mcg/actuation nasal spray 2 Sprays by Both Nostrils route daily as needed. For allergies 17  Yes [place], [place], [address]   umeclidinium-vilanterol (ANORO ELLIPTA) 62.5-25 mcg/actuation inhaler Take 1 Puff by inhalation daily. For COPD 17  Yes [place], [place], [address]   OTHER BMP in 1 week  Dx: ARF  Fax results to Dr. Kaia De La Vega, Nephrology and PCP Dr. Juliana Stone. 3/23/21   Mirna Bingham MD       No Known Allergies      Review of Systems  GENERAL: Patient alert, awake and oriented times 3, able to communicate full sentences and not in distress. HEENT: No change in vision, sore throat or sinus congestion. NECK: No pain or stiffness. PULMONARY: No shortness of breath, cough or wheeze. Cardiovascular: no pnd or orthopnea, no CP  GASTROINTESTINAL: No abdominal pain, nausea, vomiting or diarrhea, or blood per rectum. GENITOURINARY: Positive decreased appetite, poor oral intake and urinary frequency, no urgency, hesitancy or dysuria. MUSCULOSKELETAL: No joint or muscle pain, no back pain, no recent trauma. DERMATOLOGIC: No rash, no itching, no lesions. ENDOCRINE: No polyuria, polydipsia, no heat or cold intolerance. No recent change in weight. HEMATOLOGICAL: No anemia or easy bruising or bleeding. NEUROLOGIC: No headache, seizures, numbness, tingling or weakness.        Physical Exam:     Physical Exam:  Visit Vitals  BP (!) 102/57   Pulse 96   Temp 98.1 °F (36.7 °C)   Resp 20   Ht 5' 4\" (1.626 m)   Wt 99.5 kg (219 lb 6.4 oz)   SpO2 98%   BMI 37.66 kg/m²      O2 Device: None (Room air)    Temp (24hrs), Av.8 °F (36.6 °C), Min:97 °F (36.1 °C), Max:98.4 °F (36.9 °C)    701 - 1900  In: -   Out: 400 [Urine:400]   1901 - 700  In: 5996 [P.O.:114; I.V.:1200]  Out: 1750 [Urine:1750]    General:  Alert, cooperative, no distress, appears stated age. Head: Normocephalic, without obvious abnormality, atraumatic. Eyes:  Conjunctivae/corneas clear. PERRL, EOMs intact. Nose: Nares normal. No drainage or sinus tenderness. Neck: Supple, symmetrical, trachea midline, no JVD. Lungs:   Clear to auscultation bilaterally. Heart:  Regular rate and rhythm, S1, S2 normal.     Abdomen: Soft, non-tender. Bowel sounds normal.    Extremities: Extremities with 1+ nonpitting edema from the ankles to the midshin, atraumatic, no cyanosis. Pulses: 2+ and symmetric all extremities. Skin:  No rashes or lesions   Neurologic: AAOx3, No focal motor or sensory deficit.        Labs Reviewed:    BMP:   Lab Results   Component Value Date/Time     12/18/2021 02:19 AM    K 4.7 12/18/2021 02:19 AM     (H) 12/18/2021 02:19 AM    CO2 18 (L) 12/18/2021 02:19 AM    AGAP 8 12/18/2021 02:19 AM    GLU 84 12/18/2021 02:19 AM     (H) 12/18/2021 02:19 AM    CREA 2.84 (H) 12/18/2021 02:19 AM    GFRAA 20 (L) 12/18/2021 02:19 AM    GFRNA 16 (L) 12/18/2021 02:19 AM     CMP:   Lab Results   Component Value Date/Time     12/18/2021 02:19 AM    K 4.7 12/18/2021 02:19 AM     (H) 12/18/2021 02:19 AM    CO2 18 (L) 12/18/2021 02:19 AM    AGAP 8 12/18/2021 02:19 AM    GLU 84 12/18/2021 02:19 AM     (H) 12/18/2021 02:19 AM    CREA 2.84 (H) 12/18/2021 02:19 AM    GFRAA 20 (L) 12/18/2021 02:19 AM    GFRNA 16 (L) 12/18/2021 02:19 AM    CA 8.2 (L) 12/18/2021 02:19 AM    MG 1.5 (L) 12/18/2021 02:19 AM    PHOS 4.6 12/18/2021 02:19 AM     CBC:   Lab Results   Component Value Date/Time    WBC 4.4 (L) 12/18/2021 02:19 AM    HGB 8.4 (L) 12/18/2021 02:19 AM    HCT 28.4 (L) 12/18/2021 02:19 AM     12/18/2021 02:19 AM     All Cardiac Markers in the last 24 hours: No results found for: CPK, CK, CKMMB, CKMB, RCK3, CKMBT, CKNDX, CKND1, GODFREY, TROPT, TROIQ, SANG, TROPT, TNIPOC, BNP, BNPP  Recent Glucose Results:   Lab Results   Component Value Date/Time    GLU 84 12/18/2021 02:19 AM     COAGS: No results found for: APTT, PTP, INR, INREXT, INREXT  Liver Panel: No results found for: ALB, CBIL, TBIL, TP, GLOB, AGRAT, ASTPOC, ALTPOC, ALT, AP      Procedures/imaging: see electronic medical records for all procedures/Xrays and details which were not copied into this note but were reviewed prior to creation of [address] is a 73 y.o. female with PMHx of chronic systolic and diastolic CHF, type II DM, CAD s/p stenting, HTN, HLD, COPD, gout and obesity who is admitted for CARA on CKD stage III. 1. CARA on CKD stage III  2. Acute urinary retention  3. Hypomagnesemia  4. Poor oral intake  5. Chronic systolic and diastolic CHF  6. Type II DM  7. HTN  8. HLD  9. COPD without acute exacerbation  10. Hx CAD status post stenting  11. Gout  12. Obesity    Nephrology consulted and following  Continue bicarb drip per nephrology  Continue arformoterol and ipratropium nebs  Continue home meds except hold carvedilol for low normal BPs  Replace magnesium  Continue home long-acting insulin-15 units daily  Continue SSI with Accu-Cheks  Follow-up CBC, BMP, mag  Continue Frye for now, start Flomax, void trial in the next 1-2 days  Strict I's and O's  PT, OT      DVT prophylaxis: SQH  Diet: Diabetic    Contact: Mitra Hernandez ()    [phone]   Code Status: FULL    Disposition: Admit to medical bed, >2 nights       Discussed with patient at bedside about hospital admission and my plan of care, who understood and agreed with my plan of care. Chelle Dunn DO   12/18/2021       Dragon medical dictation software was used for portions of this report. Unintended errors may occur.

## 2021-12-18 NOTE — PROGRESS NOTES
Bedside shift change report given to Dallas Gold RN (oncoming nurse) by Clark Frazier RN (offgoing nurse). Report included the following information SBAR, Kardex, Intake/Output, MAR and Recent Results.

## 2021-12-18 NOTE — PROGRESS NOTES
Problem: Falls - Risk of  Goal: *Absence of Falls  Description: Document Bard Sesay Fall Risk and appropriate interventions in the flowsheet. Outcome: Progressing Towards Goal  Note: Fall Risk Interventions:  Mobility Interventions: Bed/chair exit alarm,Patient to call before getting OOB,Utilize walker, cane, or other assistive device    Mentation Interventions: Adequate sleep, hydration, pain control,Bed/chair exit alarm,Door open when patient unattended,More frequent rounding,Reorient patient,Room close to nurse's station,Toileting rounds,Update white board    Medication Interventions: Bed/chair exit alarm,Patient to call before getting OOB,Teach patient to arise slowly    Elimination Interventions: Bed/chair exit alarm,Call light in reach,Elevated toilet seat,Patient to call for help with toileting needs,Stay With Me (per policy),Toilet paper/wipes in reach,Toileting schedule/hourly rounds              Problem: Patient Education: Go to Patient Education Activity  Goal: Patient/Family Education  Outcome: Progressing Towards Goal     Problem: Diabetes Self-Management  Goal: *Disease process and treatment process  Description: Define diabetes and identify own type of diabetes; list 3 options for treating diabetes. Outcome: Progressing Towards Goal  Goal: *Incorporating nutritional management into lifestyle  Description: Describe effect of type, amount and timing of food on blood glucose; list 3 methods for planning meals. Outcome: Progressing Towards Goal  Goal: *Incorporating physical activity into lifestyle  Description: State effect of exercise on blood glucose levels. Outcome: Progressing Towards Goal  Goal: *Developing strategies to promote health/change behavior  Description: Define the ABC's of diabetes; identify appropriate screenings, schedule and personal plan for screenings.   Outcome: Progressing Towards Goal  Goal: *Using medications safely  Description: State effect of diabetes medications on diabetes; name diabetes medication taking, action and side effects. Outcome: Progressing Towards Goal  Goal: *Monitoring blood glucose, interpreting and using results  Description: Identify recommended blood glucose targets  and personal targets. Outcome: Progressing Towards Goal  Goal: *Prevention, detection, treatment of acute complications  Description: List symptoms of hyper- and hypoglycemia; describe how to treat low blood sugar and actions for lowering  high blood glucose level. Outcome: Progressing Towards Goal  Goal: *Prevention, detection and treatment of chronic complications  Description: Define the natural course of diabetes and describe the relationship of blood glucose levels to long term complications of diabetes. Outcome: Progressing Towards Goal  Goal: *Developing strategies to address psychosocial issues  Description: Describe feelings about living with diabetes; identify support needed and support network  Outcome: Progressing Towards Goal  Goal: *Sick day guidelines  Outcome: Progressing Towards Goal  Goal: *Patient Specific Goal (EDIT GOAL, INSERT TEXT)  Outcome: Progressing Towards Goal     Problem: Patient Education: Go to Patient Education Activity  Goal: Patient/Family Education  Outcome: Progressing Towards Goal     Problem: Pressure Injury - Risk of  Goal: *Prevention of pressure injury  Description: Document Vladislav Scale and appropriate interventions in the flowsheet. Outcome: Progressing Towards Goal  Note: Pressure Injury Interventions:  Sensory Interventions: Assess changes in LOC,Check visual cues for pain,Float heels,Keep linens dry and wrinkle-free,Minimize linen layers,Pressure redistribution bed/mattress (bed type),Turn and reposition approx.  every two hours (pillows and wedges if needed)    Moisture Interventions: Absorbent underpads,Internal/External urinary devices,Minimize layers,Offer toileting Q_hr    Activity Interventions: Increase time out of bed,Pressure redistribution bed/mattress(bed type)    Mobility Interventions: Float heels,HOB 30 degrees or less,Pressure redistribution bed/mattress (bed type),Turn and reposition approx.  every two hours(pillow and wedges)    Nutrition Interventions: Document food/fluid/supplement intake                     Problem: Patient Education: Go to Patient Education Activity  Goal: Patient/Family Education  Outcome: Progressing Towards Goal     Problem: Infection - Risk of, Urinary Catheter-Associated Urinary Tract Infection  Goal: *Absence of infection signs and symptoms  Outcome: Progressing Towards Goal     Problem: Patient Education: Go to Patient Education Activity  Goal: Patient/Family Education  Outcome: Progressing Towards Goal     Problem: Patient Education: Go to Patient Education Activity  Goal: Patient/Family Education  Outcome: Progressing Towards Goal     Problem: Acute Renal Failure: Day 1  Goal: Off Pathway (Use only if patient is Off Pathway)  Outcome: Progressing Towards Goal  Goal: Activity/Safety  Outcome: Progressing Towards Goal  Goal: Consults, if ordered  Outcome: Progressing Towards Goal  Goal: Diagnostic Test/Procedures  Outcome: Progressing Towards Goal  Goal: Nutrition/Diet  Outcome: Progressing Towards Goal  Goal: Discharge Planning  Outcome: Progressing Towards Goal  Goal: Medications  Outcome: Progressing Towards Goal  Goal: Respiratory  Outcome: Progressing Towards Goal  Goal: Treatments/Interventions/Procedures  Outcome: Progressing Towards Goal  Goal: Psychosocial  Outcome: Progressing Towards Goal  Goal: *Optimal pain control at patient's stated goal  Outcome: Progressing Towards Goal  Goal: *Urinary output within identified parameters  Outcome: Progressing Towards Goal  Goal: *Hemodynamically stable  Outcome: Progressing Towards Goal  Goal: *Tolerating diet  Outcome: Progressing Towards Goal     Problem: Acute Renal Failure: Day 2  Goal: Off Pathway (Use only if patient is Off Pathway)  Outcome: Progressing Towards Goal  Goal: Activity/Safety  Outcome: Progressing Towards Goal  Goal: Consults, if ordered  Outcome: Progressing Towards Goal  Goal: Diagnostic Test/Procedures  Outcome: Progressing Towards Goal  Goal: Nutrition/Diet  Outcome: Progressing Towards Goal  Goal: Discharge Planning  Outcome: Progressing Towards Goal  Goal: Medications  Outcome: Progressing Towards Goal  Goal: Respiratory  Outcome: Progressing Towards Goal  Goal: Treatments/Interventions/Procedures  Outcome: Progressing Towards Goal  Goal: Psychosocial  Outcome: Progressing Towards Goal  Goal: *Optimal pain control at patient's stated goal  Outcome: Progressing Towards Goal  Goal: *Urinary output within identified parameters  Outcome: Progressing Towards Goal  Goal: *Hemodynamically stable  Outcome: Progressing Towards Goal  Goal: *Tolerating diet  Outcome: Progressing Towards Goal  Goal: *Lab values improving  Outcome: Progressing Towards Goal     Problem: Acute Renal Failure: Day 3  Goal: Off Pathway (Use only if patient is Off Pathway)  Outcome: Progressing Towards Goal  Goal: Activity/Safety  Outcome: Progressing Towards Goal  Goal: Consults, if ordered  Outcome: Progressing Towards Goal  Goal: Diagnostic Test/Procedures  Outcome: Progressing Towards Goal  Goal: Nutrition/Diet  Outcome: Progressing Towards Goal  Goal: Discharge Planning  Outcome: Progressing Towards Goal  Goal: Medications  Outcome: Progressing Towards Goal  Goal: Respiratory  Outcome: Progressing Towards Goal  Goal: Treatments/Interventions/Procedures  Outcome: Progressing Towards Goal  Goal: Psychosocial  Outcome: Progressing Towards Goal  Goal: *Optimal pain control at patient's stated goal  Outcome: Progressing Towards Goal  Goal: *Urinary output within identified parameters  Outcome: Progressing Towards Goal  Goal: *Hemodynamically stable  Outcome: Progressing Towards Goal  Goal: *Tolerating diet  Outcome: Progressing Towards Goal  Goal: *Lab values improving  Outcome: Progressing Towards Goal     Problem: Acute Renal Failure: Day 4  Goal: Off Pathway (Use only if patient is Off Pathway)  Outcome: Progressing Towards Goal  Goal: Activity/Safety  Outcome: Progressing Towards Goal  Goal: Consults, if ordered  Outcome: Progressing Towards Goal  Goal: Diagnostic Test/Procedures  Outcome: Progressing Towards Goal  Goal: Nutrition/Diet  Outcome: Progressing Towards Goal  Goal: Discharge Planning  Outcome: Progressing Towards Goal  Goal: Medications  Outcome: Progressing Towards Goal  Goal: Respiratory  Outcome: Progressing Towards Goal  Goal: Treatments/Interventions/Procedures  Outcome: Progressing Towards Goal  Goal: Psychosocial  Outcome: Progressing Towards Goal  Goal: *Optimal pain control at patient's stated goal  Outcome: Progressing Towards Goal  Goal: *Urinary output within identified parameters  Outcome: Progressing Towards Goal  Goal: *Hemodynamically stable  Outcome: Progressing Towards Goal  Goal: *Tolerating diet  Outcome: Progressing Towards Goal  Goal: *Lab values improving  Outcome: Progressing Towards Goal     Problem: Acute Renal Failure: Day 5  Goal: Off Pathway (Use only if patient is Off Pathway)  Outcome: Progressing Towards Goal  Goal: Activity/Safety  Outcome: Progressing Towards Goal  Goal: Diagnostic Test/Procedures  Outcome: Progressing Towards Goal  Goal: Nutrition/Diet  Outcome: Progressing Towards Goal  Goal: Discharge Planning  Outcome: Progressing Towards Goal  Goal: Medications  Outcome: Progressing Towards Goal  Goal: Respiratory  Outcome: Progressing Towards Goal  Goal: Treatments/Interventions/Procedures  Outcome: Progressing Towards Goal  Goal: Psychosocial  Outcome: Progressing Towards Goal  Goal: *Optimal pain control at patient's stated goal  Outcome: Progressing Towards Goal  Goal: *Urinary output within identified parameters  Outcome: Progressing Towards Goal  Goal: *Hemodynamically stable  Outcome: Progressing Towards Goal  Goal: *Tolerating diet  Outcome: Progressing Towards Goal  Goal: *Lab values improving  Outcome: Progressing Towards Goal     Problem: Acute Renal Failure: Day 6  Goal: Off Pathway (Use only if patient is Off Pathway)  Outcome: Progressing Towards Goal  Goal: Activity/Safety  Outcome: Progressing Towards Goal  Goal: Diagnostic Test/Procedures  Outcome: Progressing Towards Goal  Goal: Nutrition/Diet  Outcome: Progressing Towards Goal  Goal: Discharge Planning  Outcome: Progressing Towards Goal  Goal: Medications  Outcome: Progressing Towards Goal  Goal: Respiratory  Outcome: Progressing Towards Goal  Goal: Treatments/Interventions/Procedures  Outcome: Progressing Towards Goal  Goal: Psychosocial  Outcome: Progressing Towards Goal     Problem: Acute Renal Failure: Discharge Outcomes  Goal: *Optimal pain control at patient's stated goal  Outcome: Progressing Towards Goal  Goal: *Urinary output within identified parameters  Outcome: Progressing Towards Goal  Goal: *Hemodynamically stable  Outcome: Progressing Towards Goal  Goal: *Tolerating diet  Outcome: Progressing Towards Goal  Goal: *Lab values stabilized  Outcome: Progressing Towards Goal  Goal: *Verbalizes understanding and describes medication purposes and frequencies  Outcome: Progressing Towards Goal  Goal: *Medication reconciliation  Outcome: Progressing Towards Goal

## 2021-12-19 LAB
ANION GAP SERPL CALC-SCNC: 8 MMOL/L (ref 3–18)
BASOPHILS # BLD: 0 K/UL (ref 0–0.1)
BASOPHILS NFR BLD: 0 % (ref 0–2)
BUN SERPL-MCNC: 80 MG/DL (ref 7–18)
BUN/CREAT SERPL: 42 (ref 12–20)
CALCIUM SERPL-MCNC: 8.3 MG/DL (ref 8.5–10.1)
CALCIUM SERPL-MCNC: 8.5 MG/DL (ref 8.5–10.1)
CHLORIDE SERPL-SCNC: 113 MMOL/L (ref 100–111)
CO2 SERPL-SCNC: 21 MMOL/L (ref 21–32)
CREAT SERPL-MCNC: 1.9 MG/DL (ref 0.6–1.3)
DIFFERENTIAL METHOD BLD: ABNORMAL
EOSINOPHIL # BLD: 0.1 K/UL (ref 0–0.4)
EOSINOPHIL NFR BLD: 2 % (ref 0–5)
ERYTHROCYTE [DISTWIDTH] IN BLOOD BY AUTOMATED COUNT: 14.1 % (ref 11.6–14.5)
GLUCOSE BLD STRIP.AUTO-MCNC: 119 MG/DL (ref 70–110)
GLUCOSE BLD STRIP.AUTO-MCNC: 120 MG/DL (ref 70–110)
GLUCOSE BLD STRIP.AUTO-MCNC: 124 MG/DL (ref 70–110)
GLUCOSE BLD STRIP.AUTO-MCNC: 152 MG/DL (ref 70–110)
GLUCOSE SERPL-MCNC: 96 MG/DL (ref 74–99)
HCT VFR BLD AUTO: 27.7 % (ref 35–45)
HGB BLD-MCNC: 8.3 G/DL (ref 12–16)
IMM GRANULOCYTES # BLD AUTO: 0 K/UL (ref 0–0.04)
IMM GRANULOCYTES NFR BLD AUTO: 0 % (ref 0–0.5)
LYMPHOCYTES # BLD: 1.5 K/UL (ref 0.9–3.6)
LYMPHOCYTES NFR BLD: 32 % (ref 21–52)
MAGNESIUM SERPL-MCNC: 2 MG/DL (ref 1.6–2.6)
MCH RBC QN AUTO: 27.5 PG (ref 24–34)
MCHC RBC AUTO-ENTMCNC: 30 G/DL (ref 31–37)
MCV RBC AUTO: 91.7 FL (ref 78–100)
MONOCYTES # BLD: 0.6 K/UL (ref 0.05–1.2)
MONOCYTES NFR BLD: 12 % (ref 3–10)
NEUTS SEG # BLD: 2.5 K/UL (ref 1.8–8)
NEUTS SEG NFR BLD: 53 % (ref 40–73)
NRBC # BLD: 0 K/UL (ref 0–0.01)
NRBC BLD-RTO: 0 PER 100 WBC
PLATELET # BLD AUTO: 306 K/UL (ref 135–420)
PMV BLD AUTO: 10.2 FL (ref 9.2–11.8)
POTASSIUM SERPL-SCNC: 4.2 MMOL/L (ref 3.5–5.5)
PTH-INTACT SERPL-MCNC: 379.3 PG/ML (ref 18.4–88)
RBC # BLD AUTO: 3.02 M/UL (ref 4.2–5.3)
SODIUM SERPL-SCNC: 142 MMOL/L (ref 136–145)
WBC # BLD AUTO: 4.7 K/UL (ref 4.6–13.2)

## 2021-12-19 PROCEDURE — 82962 GLUCOSE BLOOD TEST: CPT

## 2021-12-19 PROCEDURE — 94761 N-INVAS EAR/PLS OXIMETRY MLT: CPT

## 2021-12-19 PROCEDURE — 83970 ASSAY OF PARATHORMONE: CPT

## 2021-12-19 PROCEDURE — 65270000029 HC RM PRIVATE

## 2021-12-19 PROCEDURE — 94640 AIRWAY INHALATION TREATMENT: CPT

## 2021-12-19 PROCEDURE — 74011000250 HC RX REV CODE- 250: Performed by: FAMILY MEDICINE

## 2021-12-19 PROCEDURE — 74011250637 HC RX REV CODE- 250/637: Performed by: FAMILY MEDICINE

## 2021-12-19 PROCEDURE — 74011636637 HC RX REV CODE- 636/637: Performed by: FAMILY MEDICINE

## 2021-12-19 PROCEDURE — 97165 OT EVAL LOW COMPLEX 30 MIN: CPT

## 2021-12-19 PROCEDURE — 83735 ASSAY OF MAGNESIUM: CPT

## 2021-12-19 PROCEDURE — 74011000250 HC RX REV CODE- 250: Performed by: INTERNAL MEDICINE

## 2021-12-19 PROCEDURE — 80048 BASIC METABOLIC PNL TOTAL CA: CPT

## 2021-12-19 PROCEDURE — 99233 SBSQ HOSP IP/OBS HIGH 50: CPT | Performed by: FAMILY MEDICINE

## 2021-12-19 PROCEDURE — 97535 SELF CARE MNGMENT TRAINING: CPT

## 2021-12-19 PROCEDURE — 36415 COLL VENOUS BLD VENIPUNCTURE: CPT

## 2021-12-19 PROCEDURE — 85025 COMPLETE CBC W/AUTO DIFF WBC: CPT

## 2021-12-19 RX ORDER — IPRATROPIUM BROMIDE 0.5 MG/2.5ML
0.5 SOLUTION RESPIRATORY (INHALATION)
Status: DISCONTINUED | OUTPATIENT
Start: 2021-12-19 | End: 2021-12-20 | Stop reason: HOSPADM

## 2021-12-19 RX ORDER — IPRATROPIUM BROMIDE AND ALBUTEROL SULFATE 2.5; .5 MG/3ML; MG/3ML
3 SOLUTION RESPIRATORY (INHALATION)
Status: DISCONTINUED | OUTPATIENT
Start: 2021-12-19 | End: 2021-12-20 | Stop reason: HOSPADM

## 2021-12-19 RX ORDER — SACUBITRIL AND VALSARTAN 97; 103 MG/1; MG/1
1 TABLET, FILM COATED ORAL 2 TIMES DAILY
COMMUNITY
End: 2021-12-20

## 2021-12-19 RX ORDER — BUMETANIDE 2 MG/1
2 TABLET ORAL DAILY
Status: ON HOLD | COMMUNITY
End: 2021-12-20 | Stop reason: SDUPTHER

## 2021-12-19 RX ADMIN — Medication 10 ML: at 06:41

## 2021-12-19 RX ADMIN — ATORVASTATIN CALCIUM 80 MG: 40 TABLET, FILM COATED ORAL at 08:20

## 2021-12-19 RX ADMIN — ARFORMOTEROL TARTRATE 15 MCG: 15 SOLUTION RESPIRATORY (INHALATION) at 22:49

## 2021-12-19 RX ADMIN — ARFORMOTEROL TARTRATE 15 MCG: 15 SOLUTION RESPIRATORY (INHALATION) at 07:40

## 2021-12-19 RX ADMIN — ACETAMINOPHEN 650 MG: 325 TABLET ORAL at 19:08

## 2021-12-19 RX ADMIN — IPRATROPIUM BROMIDE 0.5 MG: 0.5 SOLUTION RESPIRATORY (INHALATION) at 07:40

## 2021-12-19 RX ADMIN — ASPIRIN 81 MG CHEWABLE TABLET 162 MG: 81 TABLET CHEWABLE at 08:16

## 2021-12-19 RX ADMIN — Medication 400 MG: at 08:17

## 2021-12-19 RX ADMIN — ACETAMINOPHEN 650 MG: 325 TABLET ORAL at 08:15

## 2021-12-19 RX ADMIN — FLUTICASONE PROPIONATE 2 SPRAY: 50 SPRAY, METERED NASAL at 09:00

## 2021-12-19 RX ADMIN — Medication 400 MG: at 17:21

## 2021-12-19 RX ADMIN — SODIUM BICARBONATE: 84 INJECTION, SOLUTION INTRAVENOUS at 03:05

## 2021-12-19 RX ADMIN — Medication 10 ML: at 22:40

## 2021-12-19 RX ADMIN — Medication 15 UNITS: at 08:17

## 2021-12-19 RX ADMIN — Medication 10 ML: at 14:00

## 2021-12-19 RX ADMIN — IPRATROPIUM BROMIDE 0.5 MG: 0.5 SOLUTION RESPIRATORY (INHALATION) at 22:49

## 2021-12-19 RX ADMIN — Medication 2 UNITS: at 08:47

## 2021-12-19 RX ADMIN — SODIUM BICARBONATE: 84 INJECTION, SOLUTION INTRAVENOUS at 17:21

## 2021-12-19 RX ADMIN — CALCITRIOL CAPSULES 0.25 MCG 0.25 MCG: 0.25 CAPSULE ORAL at 08:16

## 2021-12-19 RX ADMIN — TAMSULOSIN HYDROCHLORIDE 0.4 MG: 0.4 CAPSULE ORAL at 08:16

## 2021-12-19 NOTE — PROGRESS NOTES
RENAL DAILY PROGRESS NOTE    Patient: Rubio Pablo               Sex: female          DOA: 12/16/2021  8:58 PM        YOB: 1950      Age:  73 y.o.        LOS:  LOS: 2 days     Subjective:     Rubio Pablo is a 73 y.o.  who presents with Acute renal failure ([facility] [address] [facility] [N17.9]  [facility] [N17.9].    Asked to evaluate for worsening renal failure,hx of crf stage 3,dm,chf ,on diuretics and entresto  Chief complains: Patient denies nausea, vomiting, chest pain, dizziness, shortness of breath or headache.  - Reviewed last 24 hrs events     Current Facility-Administered Medications   Medication Dose Route Frequency    ipratropium (ATROVENT) 0.02 % nebulizer solution 0.5 mg  0.5 mg Nebulization BID RT    albuterol-ipratropium (DUO-NEB) 2.5 MG-0.5 MG/3 ML  3 mL Nebulization Q4H PRN    sodium chloride (NS) flush 5-40 mL  5-40 mL IntraVENous Q8H    sodium chloride (NS) flush 5-40 mL  5-40 mL IntraVENous PRN    acetaminophen (TYLENOL) tablet 650 mg  650 mg Oral Q6H PRN    Or    acetaminophen (TYLENOL) suppository 650 mg  650 mg Rectal Q6H PRN    polyethylene glycol (MIRALAX) packet 17 g  17 g Oral DAILY PRN    ondansetron (ZOFRAN ODT) tablet 4 mg  4 mg Oral Q8H PRN    Or    ondansetron (ZOFRAN) injection 4 mg  4 mg IntraVENous Q6H PRN    heparin (porcine) injection 5,000 Units  5,000 Units SubCUTAneous Q8H    aspirin chewable tablet 162 mg  162 mg Oral DAILY    atorvastatin (LIPITOR) tablet 80 mg  80 mg Oral DAILY    calcitRIOL (ROCALTROL) capsule 0.25 mcg  0.25 mcg Oral DAILY    [Held by provider] carvediloL (COREG) tablet 25 mg  25 mg Oral BID WITH MEALS    fluticasone propionate (FLONASE) 50 mcg/actuation nasal spray 2 Spray  2 Spray Both Nostrils DAILY    insulin lispro (HUMALOG) injection   SubCUTAneous AC&HS    glucose chewable tablet 16 g  4 Tablet Oral PRN    glucagon (GLUCAGEN) injection 1 mg  1 mg IntraMUSCular PRN    dextrose (D50W) injection syrg 12.5-25 g  25-50 mL IntraVENous PRN    insulin glargine (LANTUS) injection 15 Units  15 Units SubCUTAneous DAILY    tamsulosin (FLOMAX) capsule 0.4 mg  0.4 mg Oral DAILY    arformoteroL (BROVANA) neb solution 15 mcg  15 mcg Nebulization BID RT    magnesium oxide (MAG-OX) tablet 400 mg  400 mg Oral BID    sodium bicarbonate (8.4%) 50 mEq in 0.45% sodium chloride 1,000 mL infusion   IntraVENous CONTINUOUS       Objective:     Visit Vitals  /70 (BP 1 Location: Right upper arm, BP Patient Position: At rest)   Pulse 83   Temp 98.2 °F (36.8 °C)   Resp 20   Ht 5' 4\" (1.626 m)   Wt 95.3 kg (210 lb 1.6 oz)   SpO2 97%   BMI 36.06 kg/m²       Intake/Output Summary (Last 24 hours) at 12/19/2021 1318  Last data filed at 12/19/2021 6459  Gross per 24 hour   Intake --   Output 1200 ml   Net -1200 ml       Physical Examination:     RS: Chest is bilateral equal, no wheezing / rales / crackles  CVS: S1-S2 heard,  Abdomen: Soft, Non tender, Not distended, Positive bowel sounds, no organomegaly, no CVA / supra pubic tenderness  Extremities: No edema, no cyanosis, skin is warm on touch  CNS: Awake & follows commands, CN II-XII are grossly intact. HEENT: Head is atraumatic, PERRLA, conjunctiva pink & non icteric. No JVD or carotid bruit       Data Review:      Labs:     Hematology:   Recent Labs     12/19/21  0326 12/18/21 0219 12/16/21  2155   WBC 4.7 4.4* 6.5   HGB 8.3* 8.4* 10.1*   HCT 27.7* 28.4* 34.6*     Chemistry:   Recent Labs     12/19/21  0326 12/18/21  0219 12/17/21  0808 12/17/21  0744 12/16/21  2155   BUN 80* 112*  --  149* 144*   CREA 1.90* 2.84*  --  5.17* 6.26*   CA 8.3*  8.5 8.2*  --  8.8 9.2   ALB  --   --   --   --  3.5   K 4.2 4.7  --  6.9* 6.3*    141  --  141 139   * 115*  --  114* 112*   CO2 21 18*  --  14* 13*   PHOS  --  4.6 6.0*  --   --    GLU 96 84  --  128* 192*        Images:    XR (Most Recent).  CXR reviewed by me and compared with previous CXR Results from [facility] encounter on 03/17/21    XR CHEST PORT    Narrative  Portable Chest    CPT CODE: 38443    HISTORY: Line placement. FINDINGS:    Comparison 10/22/2020    Single frontal view of the chest. Lordotic positioning. Left internal jugular  line tip at the proximal to mid SVC. Multiple wires overlie the patient. No  pneumothorax. Heart size and mediastinal contours within normal limits. No  effusion or focal lung consolidation. Impression  No pneumothorax after left CVL placement. Tip in the region of the proximal to  mid SVC. CT (Most Recent) Results from Hospital Encounter encounter on 12/16/21    CT HEAD WO CONT    Narrative  CT HEAD WO CONT: 12/16/2021 9:56 PM    CLINICAL INFORMATION  Altered mental status. COMPARISON  18 March 2021    TECHNIQUE  Axial CT images of the brain acquired. Sagittal and coronal reformations  performed. The following CT dose reduction techniques were utilized: automated exposure  control and/or adjustment of the mA and/or kV according to patient size, and the  use of iterative reconstruction technique    FINDINGS  INTRA-AXIAL STRUCTURES  Cerebral cortex: Generalized cortical atrophy present. No evidence of  intracranial mass or hemorrhage. Normal gray-white differentiation. Moderate  confluent hypodensities within the periventricular and subcortical white matter  bilaterally. Ventricular system: Normal in size and morphology for the patient's age. Midline shift: No evidence of midline shift. Sella: The sella turcica and hypothalamic region are within normal limits. Cerebellum: The cerebellum is normal in appearance. Normal gray-white  differentiation. Brainstem: The brainstem is normal in appearance. The prepontine cistern is  within normal limits. Normal gray-white differentiation. EXTRA-AXIAL STRUCTURES  Normal in size and morphology for the patient's age. VASCULATURE  Normal.    EXTRACRANIAL SOFT TISSUE STRUCTURES  Orbits:  The orbits and globes are within normal limits. Sinuses: Patent paranasal sinuses and mastoid air cells bilaterally. Visualized upper cervical spine: Normal.    OSSEOUS STRUCTURES  No evidence of acute fracture. Impression  No intracranial hemorrhage or mass effect. Moderate sequela of chronic microvascular ischemic disease. EKG Results for orders placed or performed in visit on 08/23/18   AMB POC EKG ROUTINE W/ 12 LEADS, INTER & REP     Status: None    Impression    See progress note. I have personally reviewed the old medical records and patient's labs    Plan / Recommendation:      1. Acute/crf stage 3 related to dehydration,diuretics,entresto and urinary retention. improving,decrease ivf as po intake still poor. remove soto by her request,voiding trial  2.hyperkalemia,resolved  3.acidosis,improving  4.anemia ,spep is pending  5.sec hyperparathyroidism,on rocaltrol    D/w Dr. Louisa Hughes MD  Nephrology  12/19/2021

## 2021-12-19 NOTE — PROGRESS NOTES
Problem: Falls - Risk of  Goal: *Absence of Falls  Description: Document Rodolfo Galvan Fall Risk and appropriate interventions in the flowsheet. Outcome: Progressing Towards Goal  Note: Fall Risk Interventions:  Mobility Interventions: Bed/chair exit alarm,Patient to call before getting OOB    Mentation Interventions: Update white board,Toileting rounds,More frequent rounding,Increase mobility    Medication Interventions: Bed/chair exit alarm,Teach patient to arise slowly    Elimination Interventions: Bed/chair exit alarm,Call light in reach              Problem: Pressure Injury - Risk of  Goal: *Prevention of pressure injury  Description: Document Vladislav Scale and appropriate interventions in the flowsheet.   Outcome: Progressing Towards Goal  Note: Pressure Injury Interventions:  Sensory Interventions: Assess changes in LOC,Keep linens dry and wrinkle-free    Moisture Interventions: Absorbent underpads,Apply protective barrier, creams and emollients    Activity Interventions: Assess need for specialty bed,Pressure redistribution bed/mattress(bed type)    Mobility Interventions: Pressure redistribution bed/mattress (bed type),HOB 30 degrees or less    Nutrition Interventions: Document food/fluid/supplement intake                     Problem: Infection - Risk of, Urinary Catheter-Associated Urinary Tract Infection  Goal: *Absence of infection signs and symptoms  Outcome: Progressing Towards Goal

## 2021-12-19 NOTE — PROGRESS NOTES
Bedsideshift change report given to Raulito Joshua RN (oncoming nurse) by Alfreda Segura RN (offgoing nurse). Report included the following information SBAR, Kardex, MAR and Quality Measures.

## 2021-12-19 NOTE — PROGRESS NOTES
Problem: Falls - Risk of  Goal: *Absence of Falls  Description: Document Debora Johnson Fall Risk and appropriate interventions in the flowsheet. Outcome: Progressing Towards Goal  Note: Fall Risk Interventions:  Mobility Interventions: Bed/chair exit alarm    Mentation Interventions: Adequate sleep, hydration, pain control,Bed/chair exit alarm,Update white board    Medication Interventions: Bed/chair exit alarm,Teach patient to arise slowly    Elimination Interventions: Bed/chair exit alarm,Call light in reach              Problem: Pressure Injury - Risk of  Goal: *Prevention of pressure injury  Description: Document Vladislav Scale and appropriate interventions in the flowsheet.   Outcome: Progressing Towards Goal  Note: Pressure Injury Interventions:  Sensory Interventions: Assess changes in LOC,Float heels    Moisture Interventions: Absorbent underpads,Apply protective barrier, creams and emollients    Activity Interventions: Pressure redistribution bed/mattress(bed type),Increase time out of bed    Mobility Interventions: HOB 30 degrees or less,Pressure redistribution bed/mattress (bed type),Float heels    Nutrition Interventions: Document food/fluid/supplement intake                     Problem: Infection - Risk of, Urinary Catheter-Associated Urinary Tract Infection  Goal: *Absence of infection signs and symptoms  Outcome: Progressing Towards Goal

## 2021-12-19 NOTE — PROGRESS NOTES
Bedside shift change report given to FLORENCIA Chavis (oncoming nurse) by Patty Gracia (offgoing nurse). Report included the following information SBAR, Kardex, MAR and Quality Measures.

## 2021-12-19 NOTE — PROGRESS NOTES
Problem: Self Care Deficits Care Plan (Adult)  Goal: *Acute Goals and Plan of Care (Insert Text)  Outcome: Resolved/Met     OCCUPATIONAL THERAPY EVALUATION/DISCHARGE    Patient: Carmen Bautista (36 y.o. female)  Date: 12/19/2021  Primary Diagnosis: Acute renal failure (HCC) [N17.9]  Acute renal failure (ARF) (HCC) [N17.9]  Precautions:  Fall,Skin  PLOF: Patient was independent with self-care and functional mobility PTA. Patient reports having all DME but not needing it. ASSESSMENT AND RECOMMENDATIONS:  Patient cleared to participate in OT evaluation by RN. Upon entering the room, patient was supine in bed, alert, and agreeable to participate in OT evaluation with RN present administering insulin/taking vitals. Patient educated on the role of OT, evaluation process, and safety during this admission with patient verbalizing understanding. Patient is independent - stand by assist with basic self-care, modified independent with bed mobility and stand by assistance with functional transfers using rolling walker. The patient presents with good static standing and fair dynamic standing balance, however will defer to PT for functional balance and functional mobility tasks. Patient encouraged to participate in OOB activity throughout the day with staff members and sitting in chair at least 3x/day with meals to maximize PLOF. Based on the objective data described below, the patient presents with no deficits that impede pt function with ADLs, OT to d/c from caseload at this time. Skilled occupational therapy is not indicated at this time. Discharge Recommendations: Home Health Safety Eval  Further Equipment Recommendations for Discharge: rolling walker (Patient has)     SUBJECTIVE:   Patient stated I was on the bedpan for 3 hours last night and I didn't even go!     OBJECTIVE DATA SUMMARY:     Past Medical History:   Diagnosis Date    Abnormal WBC count 5/17/2016    Anemia     Bilateral shoulder pain 9/27/2016 Chondromalacia of both patellae     COPD (chronic obstructive pulmonary disease) ([facility] [address] 9/2015    mild-mod dz; Dr Richard Randhawa    Diabetes [facility]) 2013    Diabetic eye exam ([facility] [address] 2016    Dilated cardiomyopathy ([facility] [address]     Dyslipidemia     Gout     Heart attack ([facility] [address]     Heart attack ([facility] [address]     Heart attack ([facility] [address] 10/18/2019    History of echocardiogram 11/14/2014    Mild LVE. EF 40%. Mild, diffuse hypk. Mild LAE. Mild MR. Hypercholesteremia 1/08/14    Hypertension 2000    Noncompliance with medications 2/16/2016    Obesity     Orthostatic hypotension 5/17/2016    Osteoarthritis of both knees     Pain management 04/01/2016    Dr. Jerrod Montague     Popliteal cyst, bilateral      Vitamin D deficiency 10/16/14     Past Surgical History:   Procedure Laterality Date    HX HEART CATHETERIZATION      HX TUBAL LIGATION       Barriers to Learning/Limitations: None  Compensate with: visual, verbal, tactile, kinesthetic cues/model    Home Situation:   Home Situation  Home Environment: Private residence  # Steps to Enter: 4  Rails to Enter: Yes  One/Two Story Residence: One story  Living Alone: No  Support Systems: Spouse/Significant Other  Patient Expects to be Discharged to[de-identified] [employer]  Current DME Used/Available at Home: Cane, straight,Commode, bedside,Walker, rolling,Shower chair  Tub or Shower Type: Tub/Shower combination  [x]     Right hand dominant   []     Left hand dominant    Cognitive/Behavioral Status:  Neurologic State: Alert  Orientation Level: Oriented X4  Cognition: Follows commands  Safety/Judgement: Fall prevention    Skin: Intact  Edema: None noted    Vision/Perceptual:           Acuity: Within Defined Limits    Corrective Lenses: Reading glasses    Coordination: BUE     Fine Motor Skills-Upper: Left Impaired;Right Impaired (d/t tremors; additional time needed- can complete tasks)    Gross Motor Skills-Upper: Left Intact; Right Intact    Balance:  Sitting: Intact  Standing: Impaired; With support  Standing - Static: Good  Standing - Dynamic : Fair    Strength: BUE    Strength: Generally decreased, functional              Tone & Sensation: BUE    Tone:  (pt observed with BUE tremors)  Sensation: Impaired (L 4th and 5th digit tingling)                    Range of Motion: BUE    AROM: Within functional limits                         Functional Mobility and Transfers for ADLs:  Bed Mobility:     Supine to Sit: Modified independent  Sit to Supine: Modified independent  Scooting: Modified independent  Transfers:  Sit to Stand: Stand-by assistance  Stand to Sit: Stand-by assistance                                 ADL Assessment:  Feeding: Independent    Oral Facial Hygiene/Grooming: Independent    Bathing: Modified independent;Stand-by assistance    Upper Body Dressing: Independent    Lower Body Dressing: Modified independent;Stand-by assistance    Toileting: Stand by assistance                ADL Intervention:                           Lower Body Dressing Assistance  Dressing Assistance: Modified independent;Stand-by assistance  Underpants: Stand-by assistance (simulation standing)  Socks: Modified independent  Leg Crossed Method Used: Yes  Position Performed: Seated edge of bed         Cognitive Retraining  Safety/Judgement: Fall prevention      Pain:  Pain level pre-treatment: 4-5/10 , stomach after pain meds  Pain level post-treatment: -/10   Pain Intervention(s): Medication (see MAR); Response to intervention: Nurse notified, See doc flow    Activity Tolerance:   Fair+    Please refer to the flowsheet for vital signs taken during this treatment.   After treatment:   []  Patient left in no apparent distress sitting up in chair  [x]  Patient left in no apparent distress in bed  [x]  Call bell left within reach  [x]  Nursing notified  []  Caregiver present  [x]  Bed alarm activated    COMMUNICATION/EDUCATION:   [x]      Role of Occupational Therapy in the acute care setting  [x]      Home safety education was provided and the patient/caregiver indicated understanding. [x]      Patient/family have participated as able and agree with findings and recommendations. []      Patient is unable to participate in plan of care at this time. Thank you for this referral.  Sharifa Hall, OTR/L  Time Calculation: 16 mins      Eval Complexity: History: MEDIUM Complexity : Expanded review of history including physical, cognitive and psychosocial  history ; Examination: LOW Complexity : 1-3 performance deficits relating to physical, cognitive , or psychosocial skils that result in activity limitations and / or participation restrictions ;    Decision Making:LOW Complexity : No comorbidities that affect functional and no verbal or physical assistance needed to complete eval tasks

## 2021-12-20 VITALS
RESPIRATION RATE: 18 BRPM | BODY MASS INDEX: 35.87 KG/M2 | OXYGEN SATURATION: 97 % | HEIGHT: 64 IN | WEIGHT: 210.1 LBS | DIASTOLIC BLOOD PRESSURE: 69 MMHG | HEART RATE: 81 BPM | SYSTOLIC BLOOD PRESSURE: 146 MMHG | TEMPERATURE: 97.3 F

## 2021-12-20 LAB
ANION GAP SERPL CALC-SCNC: 6 MMOL/L (ref 3–18)
BUN SERPL-MCNC: 54 MG/DL (ref 7–18)
BUN/CREAT SERPL: 34 (ref 12–20)
CALCIUM SERPL-MCNC: 8.3 MG/DL (ref 8.5–10.1)
CHLORIDE SERPL-SCNC: 110 MMOL/L (ref 100–111)
CO2 SERPL-SCNC: 25 MMOL/L (ref 21–32)
CREAT SERPL-MCNC: 1.57 MG/DL (ref 0.6–1.3)
GLUCOSE BLD STRIP.AUTO-MCNC: 114 MG/DL (ref 70–110)
GLUCOSE BLD STRIP.AUTO-MCNC: 132 MG/DL (ref 70–110)
GLUCOSE SERPL-MCNC: 98 MG/DL (ref 74–99)
HCT VFR BLD AUTO: 27.5 % (ref 35–45)
HGB BLD-MCNC: 8.2 G/DL (ref 12–16)
POTASSIUM SERPL-SCNC: 4.3 MMOL/L (ref 3.5–5.5)
SODIUM SERPL-SCNC: 141 MMOL/L (ref 136–145)

## 2021-12-20 PROCEDURE — 82962 GLUCOSE BLOOD TEST: CPT

## 2021-12-20 PROCEDURE — 74011250637 HC RX REV CODE- 250/637: Performed by: FAMILY MEDICINE

## 2021-12-20 PROCEDURE — 36415 COLL VENOUS BLD VENIPUNCTURE: CPT

## 2021-12-20 PROCEDURE — 85014 HEMATOCRIT: CPT

## 2021-12-20 PROCEDURE — 74011636637 HC RX REV CODE- 636/637: Performed by: FAMILY MEDICINE

## 2021-12-20 PROCEDURE — 80048 BASIC METABOLIC PNL TOTAL CA: CPT

## 2021-12-20 PROCEDURE — 97161 PT EVAL LOW COMPLEX 20 MIN: CPT

## 2021-12-20 RX ORDER — BUMETANIDE 2 MG/1
1 TABLET ORAL DAILY
Qty: 30 TABLET | Refills: 2 | Status: ON HOLD | OUTPATIENT
Start: 2021-12-20 | End: 2022-06-28

## 2021-12-20 RX ORDER — LANOLIN ALCOHOL/MO/W.PET/CERES
400 CREAM (GRAM) TOPICAL 2 TIMES DAILY
Qty: 14 TABLET | Refills: 0 | Status: SHIPPED | OUTPATIENT
Start: 2021-12-20 | End: 2022-03-11

## 2021-12-20 RX ADMIN — Medication 10 ML: at 13:15

## 2021-12-20 RX ADMIN — Medication 400 MG: at 08:19

## 2021-12-20 RX ADMIN — ATORVASTATIN CALCIUM 80 MG: 40 TABLET, FILM COATED ORAL at 08:20

## 2021-12-20 RX ADMIN — Medication 10 ML: at 05:41

## 2021-12-20 RX ADMIN — TAMSULOSIN HYDROCHLORIDE 0.4 MG: 0.4 CAPSULE ORAL at 08:19

## 2021-12-20 RX ADMIN — CALCITRIOL CAPSULES 0.25 MCG 0.25 MCG: 0.25 CAPSULE ORAL at 08:20

## 2021-12-20 RX ADMIN — CARVEDILOL 25 MG: 25 TABLET, FILM COATED ORAL at 08:20

## 2021-12-20 RX ADMIN — Medication 15 UNITS: at 08:21

## 2021-12-20 RX ADMIN — ASPIRIN 81 MG CHEWABLE TABLET 162 MG: 81 TABLET CHEWABLE at 08:19

## 2021-12-20 NOTE — PROGRESS NOTES
Assume care and report from [address] [place] [address] with patient transferring from bed to bedside commode with minimal assistance. Alert and oriented X 4. Denies pain or discomfort at present. Bed locked in lowest position. Call light within reach and understand to use for assistance and needs. 2200 Patient transferring to bedside commode with minimum assistance. Voiding yellow urine without problems. No complaint of pain or discomfort while voiding. 12/20/2021    0600 Patient resting quietly without complaint voiced. 0725 Bedside and Verbal shift change report given to Carolina Ross RN (oncoming nurse) by Alejandro Cook RN (offgoing nurse). Report given with SBAR, Kardex, Intake/Output, MAR and Recent Results.

## 2021-12-20 NOTE — PROGRESS NOTES
D/C order noted for today. Orders reviewed. Patient's  will be transporting patient home today at time of discharge. No needs identified at this time. CM remains available if needed.              Philip Gallardo -[phone]

## 2021-12-20 NOTE — DISCHARGE SUMMARY
Discharge Summary      Patient: July He MRN: [id number]  CSN: [id number]    YOB: 1950  Age: 73 y.o. Sex: female    DOA: 12/16/2021 LOS:  LOS: 3 days   Discharge Date: 12/20/21     Admission Diagnoses: Acute renal failure ([facility] [address] [N17.9]  Acute renal failure (ARF) ([address] [N17.9]    Discharge Diagnoses:    1. CARA on CKD stage IIICARA resolved  2. Acute urinary retentionresolved  3. Hypomagnesemiaresolved  4. Acute on chronic anemialikely worsened by dilution  5. Poor oral intake  6. Chronic systolic and diastolic CHF  7. Type II DM- HbA1c 6.9 on 12/16  8. HTN  9. HLD  10. COPD without acute exacerbation  11. Hx CAD status post stenting  12. Gout  13. Obesity      Discharge Condition: Stable    PHYSICAL EXAM  Visit Vitals  BP (!) 146/69 (BP 1 Location: Left upper arm, BP Patient Position: Sitting)   Pulse 81   Temp 97.3 °F (36.3 °C)   Resp 18   Ht 5' 4\" (1.626 m)   Wt 95.3 kg (210 lb 1.6 oz)   SpO2 97%   BMI 36.06 kg/m²       General:         Alert, cooperative, no acute distress    HEENT:           NC, Atraumatic. Anicteric sclerae. Lungs:            CTA Bilaterally. No Wheezing/Rhonchi/Rales. Heart:              Regular  rhythm,  No murmur, No Rubs, No Gallops  Abdomen:      Soft, Non distended, Non tender.  +Bowel sounds, no HSM  Extremities:   No c/c/e  Psych:              Not anxious or agitated. Neurologic:     Alert and oriented X 3. No acute neurological deficits    Hospital Course: ***          Consults:   NephrologyDr. Zurdo Olson MD      Significant Diagnostic Studies:   CT head without contrast 12/16/2021:  No intracranial hemorrhage or mass effect. Moderate sequela of chronic microvascular ischemic disease. Retroperitoneal ultrasound 12/17/2021:  1. No hydronephrosis. 2. Increased echogenicity of the kidneys suggests medical renal disease.       Procedures Performed: None      Discharge Medications:  Current Discharge Medication List      START taking these medications    Details   magnesium oxide (MAG-OX) 400 mg tablet Take 1 Tablet by mouth two (2) times a day. Qty: 14 Tablet, Refills: 0  Start date: 12/20/2021         CONTINUE these medications which have CHANGED    Details   bumetanide (BUMEX) 2 mg tablet Take 0.5 Tablets by mouth daily. Qty: 30 Tablet, Refills: 2  Start date: 12/20/2021         CONTINUE these medications which have NOT CHANGED    Details   hydrOXYzine HCL (ATARAX) 25 mg tablet Take 1-2 tablets by mouth every 6 hours as needed. Qty: 30 Tab, Refills: 0      calcitRIOL (ROCALTROL) 0.25 mcg capsule Take 1 Cap by mouth daily. Qty: 30 Cap, Refills: 0      cyanocobalamin 1,000 mcg tablet Take 1 Tab by mouth daily. Qty: 30 Tab, Refills: 0      albuterol-ipratropium (DUO-NEB) 2.5 mg-0.5 mg/3 ml nebu 3 mL by Nebulization route every six (6) hours as needed for Wheezing. Qty: 30 Nebule, Refills: 0      atorvastatin (LIPITOR) 80 mg tablet Take 1 Tab by mouth daily. Qty: 90 Tab, Refills: 3    Associated Diagnoses: Dyslipidemia, goal LDL below 70      carvediloL (COREG) 25 mg tablet Take 1 Tab by mouth two (2) times daily (with meals). Qty: 180 Tab, Refills: 3    Associated Diagnoses: Essential hypertension; Chronic systolic congestive heart failure (HCC)      tiZANidine (ZANAFLEX) 4 mg tablet Take 4 mg by mouth two (2) times daily as needed. albuterol (PROVENTIL HFA, VENTOLIN HFA, PROAIR HFA) 90 mcg/actuation inhaler Take 2 Puffs by inhalation every four (4) hours as needed for Wheezing. Qty: 1 Inhaler, Refills: 0      aspirin 81 mg chewable tablet Take 2 Tabs by mouth daily.   Qty: 60 Tab, Refills: 0    Associated Diagnoses: Coronary artery disease involving native coronary artery of native heart without angina pectoris      insulin detemir (LEVEMIR) 100 unit/mL injection 15 units daily for diabetes  Qty: 2 Vial, Refills: 0    Associated Diagnoses: Diabetes mellitus type 2, insulin dependent (HCC)      desloratadine (CLARINEX) 5 mg tablet Take 1 Tab by mouth daily as needed for Allergies. Qty: 30 Tab, Refills: 0    Associated Diagnoses: Environmental allergies      mometasone (NASONEX) 50 mcg/actuation nasal spray 2 Sprays by Both Nostrils route daily as needed. For allergies  Qty: 3 Container, Refills: 3    Associated Diagnoses: Environmental allergies      umeclidinium-vilanterol (ANORO ELLIPTA) 62.5-25 mcg/actuation inhaler Take 1 Puff by inhalation daily. For COPD  Qty: 3 Inhaler, Refills: 3    Comments: 9/22/15 Dr Tc Tinajero prescribed. Will order via [facility] if available  Associated Diagnoses: Chronic obstructive pulmonary disease, unspecified COPD type [facility]         STOP taking these medications       sacubitriL-valsartan (Entresto)  mg tablet Comments:   Reason for Stopping:         predniSONE (STERAPRED DS) 10 mg dose pack Comments:   Reason for Stopping:                Activity: activity as tolerated    Diet: Cardiac Diet and Diabetic Diet    Wound Care: None needed      Follow-up Information     Follow up With Specialties Details Why Contact Info    Bernadine Fuchs MD Internal Medicine   [facility]  [phone]             Minutes spent on discharge: >30 minutes spent coordinating this discharge (review instructions/follow-up, prescriptions, preparing report for sign off)          SY Wyatt DO   December 20, 2021       [facility] medical dictation software was used for portions of this report. Unintended errors may occur.

## 2021-12-20 NOTE — PROGRESS NOTES
PHYSICAL THERAPY EVALUATION    Patient: Franc Miller (86 y.o. female)  Date: 12/20/2021  Primary Diagnosis: Acute renal failure (HCC) [N17.9]  Acute renal failure (ARF) (HCC) [N17.9]        Precautions:  Fall    ASSESSMENT :  Based on the objective data described below, the patient presents with generalized weakness, slight unsteadiness with amb, and dec strength. Pt found semi-reclined in bed, in NAD, willing to work with PT. Sup-sit with sup. Grossly 4/5 strength B LE. Pt stood initially without and A and amb few steps with slightly unsteady gait, reaching for objects in room. Pt was given a RW and amb 300 ft in hallways, better stability with AD. She returned back sitting up in recliner, CNA in room replacing bed sheets. Left with call bell and all needs met. Patient will benefit from skilled intervention to address the above impairments. Patient's rehabilitation potential is considered to be Good  Factors which may influence rehabilitation potential include:   []         None noted  []         Mental ability/status  [x]         Medical condition  []         Home/family situation and support systems  [x]         Safety awareness  []         Pain tolerance/management  []         Other:      PLAN :  Recommendations and Planned Interventions:   [x]           Bed Mobility Training             [x]    Neuromuscular Re-Education  [x]           Transfer Training                   []    Orthotic/Prosthetic Training  [x]           Gait Training                          []    Modalities  [x]           Therapeutic Exercises           []    Edema Management/Control  [x]           Therapeutic Activities            [x]    Family Training/Education  [x]           Patient Education  []           Other (comment):    Frequency/Duration: Patient will be followed by physical therapy 1-2 times per day/4-7 days per week to address goals.   Discharge Recommendations: Home Health  Further Equipment Recommendations for Discharge: N/A SUBJECTIVE:   Patient stated I don't need a walker.     OBJECTIVE DATA SUMMARY:     Past Medical History:   Diagnosis Date    Abnormal WBC count 5/17/2016    Anemia     Bilateral shoulder pain 9/27/2016    Chondromalacia of both patellae     COPD (chronic obstructive pulmonary disease) ([facility] [address] 9/2015    mild-mod dz; Dr Nir Stuart    Diabetes [facility]) 2013    Diabetic eye exam ([facility] [address] 2016    Dilated cardiomyopathy ([facility] [address]     Dyslipidemia     Gout     Heart attack ([facility] [address]     Heart attack ([facility] [address]     Heart attack ([facility] [address] 10/18/2019    History of echocardiogram 11/14/2014    Mild LVE. EF 40%. Mild, diffuse hypk. Mild LAE. Mild MR. Hypercholesteremia 1/08/14    Hypertension 2000    Noncompliance with medications 2/16/2016    Obesity     Orthostatic hypotension 5/17/2016    Osteoarthritis of both knees     Pain management 04/01/2016    Dr. Chris Ward     Popliteal cyst, bilateral      Vitamin D deficiency 10/16/14     Past Surgical History:   Procedure Laterality Date    HX HEART CATHETERIZATION      HX TUBAL LIGATION       Barriers to Learning/Limitations: None  Compensate with: N/A  Home Situation:  Home Situation  Home Environment: Private residence  # Steps to Enter: 4  Rails to Enter: Yes  One/Two Story Residence: One story  Living Alone: No  Support Systems: Spouse/Significant Other  Patient Expects to be Discharged to[de-identified] [employer]  Current DME Used/Available at Home: Cane, straight,Walker, rolling  Tub or Shower Type: Tub/Shower combination  Critical Behavior:  Neurologic State: Alert  Orientation Level: Oriented X4  Cognition: Follows commands  Safety/Judgement: Fall prevention  Psychosocial  Patient Behaviors: Calm; Cooperative  Needs Expressed: Educational;Emotional;Nutritional  Purposeful Interaction: Yes  Pt Identified Daily Priority: Clinical issues (comment)  Caritas Process: Nurture loving kindness; Open life/death unknowns  Caring Interventions: Reassure; Therapeutic modalities  Reassure:  Therapeutic listening  Therapeutic Modalities: Intentional therapeutic touch    Strength:    Strength: Generally decreased, functional    Tone & Sensation:   Tone: Normal    Sensation: Intact       Range Of Motion:  AROM: Within functional limits    Functional Mobility:  Bed Mobility:     Supine to Sit: Modified independent  Sit to Supine: Supervision     Transfers:  Sit to Stand: Contact guard assistance  Stand to Sit: Stand-by assistance       Balance:   Sitting: Intact  Standing: Impaired; Without support  Standing - Static: Good  Standing - Dynamic : Fair       Ambulation/Gait Training:  Distance (ft): 300 Feet (ft)  Assistive Device: Walker, rolling  Ambulation - Level of Assistance: Contact guard assistance        Gait Abnormalities: Trunk sway increased        Base of Support: Center of gravity altered; Widened    Pain:  Pain level pre-treatment: 0/10   Pain level post-treatment: 0/10   Pain Intervention(s) : Medication (see MAR); Rest, Ice, Repositioning  Response to intervention: Nurse notified, See doc flow    Activity Tolerance:   Pt tolerated mobility well  Please refer to the flowsheet for vital signs taken during this treatment. After treatment:   []         Patient left in no apparent distress sitting up in chair  [x]         Patient left in no apparent distress in bed  [x]         Call bell left within reach  [x]         Nursing notified  []         Caregiver present  []         Bed alarm activated  []         SCDs applied    COMMUNICATION/EDUCATION:   [x]         Role of Physical Therapy in the acute care setting. [x]         Fall prevention education was provided and the patient/caregiver indicated understanding. [x]         Patient/family have participated as able in goal setting and plan of care. []         Patient/family agree to work toward stated goals and plan of care. []         Patient understands intent and goals of therapy, but is neutral about his/her participation.   []         Patient is unable to participate in goal setting/plan of care: ongoing with therapy staff.  []         Other:     Thank you for this referral.  Armond Lefort   Time Calculation: 12 mins      Eval Complexity: History: LOW Complexity : Zero comorbidities / personal factors that will impact the outcome / POCExam:LOW Complexity : 1-2 Standardized tests and measures addressing body structure, function, activity limitation and / or participation in recreation  Presentation: LOW Complexity : Stable, uncomplicated  Clinical Decision Making:Low Complexity    Overall Complexity:LOW

## 2021-12-20 NOTE — PROGRESS NOTES
Reason for Admission:  Acute renal failure ([facility] [address] [N17.9]  Acute renal failure (ARF) ([facility]) [N17.9]                 RUR Score:    17%            Plan for utilizing home health:    No, not at this time. Patient said she is ambulatory and self-care. Likelihood of Readmission:   Moderate                         Do you (patient/family) have any concerns for transition/discharge?  no, not at this time. Transition of Care Plan:       Initial assessment completed with patient. Cognitive status of patient: oriented to time, place, person and situation. Face sheet information confirmed:  yes. The patient designates her  Lester Theodore [phone] to participate in her discharge plan and to receive any needed information. This patient lives in a single family home with her , with 4 steps to enter. Patient is able to navigate steps as needed. Prior to hospitalization, patient was considered to be independent with ADLs/IADLS : yes . Patient has a current ACP document on file: no.      Healthcare Decision Maker:   Primary Decision Maker: Deja Fleipe - Spouse - [phone]    Click here to complete [address] including selection of the Healthcare Decision Maker Relationship (ie \"Primary\")    The patient's  will be available to transport patient home upon discharge. The patient already has none reported,  medical equipment available in the home. Patient is not currently active with home health. Patient has not stayed in a skilled nursing facility or rehab. This patient is on dialysis :no.          Currently, the discharge plan is Home with Family Assistance. The patient states that she can obtain her medications from the pharmacy, and take her medications as directed. Patient's current insurance is [employer].        Care Management Interventions  PCP Verified by CM: Yes  Mode of Transport at Discharge: Self (Patient's  will be transporting patient home at time of discharge. )  Transition of Care Consult (CM Consult): Discharge Planning  Discharge Durable Medical Equipment: No  Physical Therapy Consult: Yes  Occupational Therapy Consult: Yes  Speech Therapy Consult: No  Support Systems: Spouse/Significant Other (Patient lives with her .)  Confirm Follow Up Transport: Family  Discharge Location  Discharge Placement: Home with family assistance        Ramin Tabor RN  Case Management [phone]

## 2021-12-20 NOTE — PROGRESS NOTES
Problem: Falls - Risk of  Goal: *Absence of Falls  Description: Document Debora Johnson Fall Risk and appropriate interventions in the flowsheet. 12/20/2021 1351 by Kristyn Pearce RN  Outcome: Progressing Towards Goal  Note: Fall Risk Interventions:  Mobility Interventions: Bed/chair exit alarm    Mentation Interventions: Adequate sleep, hydration, pain control,Bed/chair exit alarm    Medication Interventions: Bed/chair exit alarm    Elimination Interventions: Bed/chair exit alarm,Call light in reach           12/20/2021 0736 by Kristyn Pearce RN  Outcome: Progressing Towards Goal  Note: Fall Risk Interventions:  Mobility Interventions: Bed/chair exit alarm,Patient to call before getting OOB    Mentation Interventions: Update white board,Toileting rounds,More frequent rounding,Increase mobility    Medication Interventions: Bed/chair exit alarm,Teach patient to arise slowly    Elimination Interventions: Bed/chair exit alarm,Call light in reach              Problem: Patient Education: Go to Patient Education Activity  Goal: Patient/Family Education  12/20/2021 1351 by Kristyn Pearce RN  Outcome: Progressing Towards Goal  12/20/2021 0736 by Kristyn Pearce RN  Outcome: Progressing Towards Goal     Problem: Diabetes Self-Management  Goal: *Disease process and treatment process  Description: Define diabetes and identify own type of diabetes; list 3 options for treating diabetes. 12/20/2021 1351 by Kristyn Pearce RN  Outcome: Progressing Towards Goal  12/20/2021 0736 by Kristyn Pearce RN  Outcome: Progressing Towards Goal  Goal: *Incorporating nutritional management into lifestyle  Description: Describe effect of type, amount and timing of food on blood glucose; list 3 methods for planning meals.   12/20/2021 1351 by Kristyn Pearce RN  Outcome: Progressing Towards Goal  12/20/2021 0736 by Kristyn Pearce RN  Outcome: Progressing Towards Goal  Goal: *Incorporating physical activity into lifestyle  Description: [facility] effect of exercise on blood glucose levels. 12/20/2021 1351 by Parmjit Lowry RN  Outcome: Progressing Towards Goal  12/20/2021 0736 by Parmjit Lowry RN  Outcome: Progressing Towards Goal  Goal: *Developing strategies to promote health/change behavior  Description: Define the ABC's of diabetes; identify appropriate screenings, schedule and personal plan for screenings. 12/20/2021 1351 by Parmjit Lowry RN  Outcome: Progressing Towards Goal  12/20/2021 0736 by Parmjit Lowry RN  Outcome: Progressing Towards Goal  Goal: *Using medications safely  Description: State effect of diabetes medications on diabetes; name diabetes medication taking, action and side effects. 12/20/2021 1351 by Parmjit Lowry RN  Outcome: Progressing Towards Goal  12/20/2021 0736 by Parmjit Lowry RN  Outcome: Progressing Towards Goal  Goal: *Monitoring blood glucose, interpreting and using results  Description: Identify recommended blood glucose targets  and personal targets. 12/20/2021 1351 by Parmjit Lowry RN  Outcome: Progressing Towards Goal  12/20/2021 0736 by Parmjit Lowry RN  Outcome: Progressing Towards Goal  Goal: *Prevention, detection, treatment of acute complications  Description: List symptoms of hyper- and hypoglycemia; describe how to treat low blood sugar and actions for lowering  high blood glucose level. 12/20/2021 1351 by Parmjit Lowry RN  Outcome: Progressing Towards Goal  12/20/2021 0736 by Parmjit Lowry RN  Outcome: Progressing Towards Goal  Goal: *Prevention, detection and treatment of chronic complications  Description: Define the natural course of diabetes and describe the relationship of blood glucose levels to long term complications of diabetes.   12/20/2021 1351 by Parmjit Lowry RN  Outcome: Progressing Towards Goal  12/20/2021 0736 by Parmjit Lowry RN  Outcome: Progressing Towards Goal  Goal: *Developing strategies to address psychosocial issues  Description: Describe feelings about living with diabetes; identify support needed and support network  12/20/2021 1351 by Emiliano Abreu RN  Outcome: Progressing Towards Goal  12/20/2021 0736 by Emiliano Abreu RN  Outcome: Progressing Towards Goal  Goal: *Sick day guidelines  12/20/2021 1351 by Emiliano Abreu RN  Outcome: Progressing Towards Goal  12/20/2021 0736 by Emiliano Abreu RN  Outcome: Progressing Towards Goal  Goal: *Patient Specific Goal (EDIT GOAL, INSERT TEXT)  12/20/2021 1351 by Emiliano Abreu RN  Outcome: Progressing Towards Goal  12/20/2021 0736 by Emiliano Abreu RN  Outcome: Progressing Towards Goal     Problem: Diabetes Self-Management  Goal: *Disease process and treatment process  Description: Define diabetes and identify own type of diabetes; list 3 options for treating diabetes. 12/20/2021 1351 by Emiliano Abreu RN  Outcome: Progressing Towards Goal  12/20/2021 0736 by Emiliano Abreu RN  Outcome: Progressing Towards Goal  Goal: *Incorporating nutritional management into lifestyle  Description: Describe effect of type, amount and timing of food on blood glucose; list 3 methods for planning meals. 12/20/2021 1351 by Emiliano Abreu RN  Outcome: Progressing Towards Goal  12/20/2021 0736 by Emiliano Abreu RN  Outcome: Progressing Towards Goal  Goal: *Incorporating physical activity into lifestyle  Description: State effect of exercise on blood glucose levels. 12/20/2021 1351 by Emiliano Abreu RN  Outcome: Progressing Towards Goal  12/20/2021 0736 by Emiliano Abreu RN  Outcome: Progressing Towards Goal  Goal: *Developing strategies to promote health/change behavior  Description: Define the ABC's of diabetes; identify appropriate screenings, schedule and personal plan for screenings.   12/20/2021 1351 by Emiliano Abreu RN  Outcome: Progressing Towards Goal  12/20/2021 0736 by Emiliano Abreu RN  Outcome: Progressing Towards Goal  Goal: *Using medications safely  Description: State effect of diabetes medications on diabetes; name diabetes medication taking, action and side effects. 12/20/2021 1351 by Kelly Trevino RN  Outcome: Progressing Towards Goal  12/20/2021 0736 by Kelly Trevino RN  Outcome: Progressing Towards Goal  Goal: *Monitoring blood glucose, interpreting and using results  Description: Identify recommended blood glucose targets  and personal targets. 12/20/2021 1351 by Kelly Trevino RN  Outcome: Progressing Towards Goal  12/20/2021 0736 by Kelly Trevino RN  Outcome: Progressing Towards Goal  Goal: *Prevention, detection, treatment of acute complications  Description: List symptoms of hyper- and hypoglycemia; describe how to treat low blood sugar and actions for lowering  high blood glucose level. 12/20/2021 1351 by Kelly Trevino RN  Outcome: Progressing Towards Goal  12/20/2021 0736 by Kelly Trevino RN  Outcome: Progressing Towards Goal  Goal: *Prevention, detection and treatment of chronic complications  Description: Define the natural course of diabetes and describe the relationship of blood glucose levels to long term complications of diabetes.   12/20/2021 1351 by Kelly Trevino RN  Outcome: Progressing Towards Goal  12/20/2021 0736 by Kelly Trevino RN  Outcome: Progressing Towards Goal  Goal: *Developing strategies to address psychosocial issues  Description: Describe feelings about living with diabetes; identify support needed and support network  12/20/2021 1351 by Kelly Trevino RN  Outcome: Progressing Towards Goal  12/20/2021 0736 by Kelly Trevino RN  Outcome: Progressing Towards Goal  Goal: *Sick day guidelines  12/20/2021 1351 by Kelly Trevino RN  Outcome: Progressing Towards Goal  12/20/2021 0736 by Kelly Trevino RN  Outcome: Progressing Towards Goal  Goal: *Patient Specific Goal (EDIT GOAL, INSERT TEXT)  12/20/2021 1351 by Kelly Trevino RN  Outcome: Progressing Towards Goal  12/20/2021 0736 by Kelly Trevino RN  Outcome: Progressing Towards Goal     Problem: Pressure Injury - Risk of  Goal: *Prevention of pressure injury  Description: Document Vladislav Scale and appropriate interventions in the flowsheet.   12/20/2021 1351 by Isabel Mcleod RN  Outcome: Progressing Towards Goal  Note: Pressure Injury Interventions:  Sensory Interventions: Assess changes in LOC    Moisture Interventions: Absorbent underpads,Check for incontinence Q2 hours and as needed    Activity Interventions: PT/OT evaluation    Mobility Interventions: Pressure redistribution bed/mattress (bed type),HOB 30 degrees or less    Nutrition Interventions: Document food/fluid/supplement intake,Discuss nutritional consult with provider,Offer support with meals,snacks and hydration                  12/20/2021 0736 by Isabel Mcleod RN  Outcome: Progressing Towards Goal  Note: Pressure Injury Interventions:  Sensory Interventions: Assess changes in LOC,Keep linens dry and wrinkle-free    Moisture Interventions: Absorbent underpads,Apply protective barrier, creams and emollients    Activity Interventions: Assess need for specialty bed,Pressure redistribution bed/mattress(bed type)    Mobility Interventions: Pressure redistribution bed/mattress (bed type),HOB 30 degrees or less    Nutrition Interventions: Document food/fluid/supplement intake                     Problem: Acute Renal Failure: Day 6  Goal: Off Pathway (Use only if patient is Off Pathway)  12/20/2021 1351 by Isabel Mcleod RN  Outcome: Progressing Towards Goal  12/20/2021 0736 by Isabel Mcleod RN  Outcome: Progressing Towards Goal  Goal: Activity/Safety  12/20/2021 1351 by Isabel Mcleod RN  Outcome: Progressing Towards Goal  12/20/2021 0736 by Isabel Mcleod RN  Outcome: Progressing Towards Goal  Goal: Diagnostic Test/Procedures  12/20/2021 1351 by Isabel Mcleod RN  Outcome: Progressing Towards Goal  12/20/2021 0736 by Isabel Mcleod RN  Outcome: Progressing Towards Goal  Goal: Nutrition/Diet  12/20/2021 1351 by Isabel Mcleod RN  Outcome: Progressing Towards Goal  12/20/2021 0736 by Andressa Winkler RN  Outcome: Progressing Towards Goal  Goal: Discharge Planning  12/20/2021 1351 by Andressa Winkler RN  Outcome: Progressing Towards Goal  12/20/2021 0736 by Andressa Winkler RN  Outcome: Progressing Towards Goal  Goal: Medications  12/20/2021 1351 by Andressa Winkler, RN  Outcome: Progressing Towards Goal  12/20/2021 0736 by Andressa Winkler RN  Outcome: Progressing Towards Goal  Goal: Respiratory  12/20/2021 1351 by Andressa Winkler, RN  Outcome: Progressing Towards Goal  12/20/2021 0736 by Andressa Winkler RN  Outcome: Progressing Towards Goal  Goal: Treatments/Interventions/Procedures  12/20/2021 1351 by Andressa Winkler RN  Outcome: Progressing Towards Goal  12/20/2021 0736 by Andressa Winkler RN  Outcome: Progressing Towards Goal  Goal: Psychosocial  12/20/2021 1351 by Andressa Winkler RN  Outcome: Progressing Towards Goal  12/20/2021 0736 by Andressa Winkler RN  Outcome: Progressing Towards Goal

## 2021-12-20 NOTE — PROGRESS NOTES
Progress Note         Patient: Franc Miller MRN: [id number]  CSN: [id number]    YOB: 1950  Age: 73 y.o. Sex: female    DOA: 12/16/2021 LOS:  LOS: 2 days                    Subjective:     Franc Miller is a 73 y.o. female with PMHx of chronic systolic and diastolic CHF, type II DM, CAD s/p stenting, HTN, HLD, COPD, gout and obesity who is admitted for CARA on CKD stage III. Seen earlier in room  Sitting up in bed, NAD  Reports feeling tremendously better  Denies any significant complaints      Objective:     Physical Exam:  Visit Vitals  BP (!) 148/88   Pulse 79   Temp 97.9 °F (36.6 °C)   Resp 20   Ht 5' 4\" (1.626 m)   Wt 95.3 kg (210 lb 1.6 oz)   SpO2 95%   BMI 36.06 kg/m²        General:         Alert, cooperative, no acute distress    HEENT: NC, Atraumatic. Anicteric sclerae. Lungs: CTA Bilaterally. No Wheezing/Rhonchi/Rales. Heart:  Regular  rhythm,  No murmur, No Rubs, No Gallops  Abdomen: Soft, Non distended, Non tender. +Bowel sounds, no HSM  Extremities: No c/c/e  Psych:   Not anxious or agitated. Neurologic:  Alert and oriented X 3. No acute neurological deficits. Intake and Output:  Current Shift:  No intake/output data recorded. Last three shifts:  12/18 0701 - 12/19 1900  In: -   Out: 2000 [Urine:2000]    Labs: Results:       Chemistry Recent Labs     12/19/21  0326 12/18/21  0219 12/17/21  0744 12/16/21  2155 12/16/21  2155   GLU 96 84 128*   < > 192*    141 141   < > 139   K 4.2 4.7 6.9*   < > 6.3*   * 115* 114*   < > 112*   CO2 21 18* 14*   < > 13*   BUN 80* 112* 149*   < > 144*   CREA 1.90* 2.84* 5.17*   < > 6.26*   CA 8.3*  8.5 8.2* 8.8   < > 9.2   AGAP 8 8 13   < > 14   BUCR 42* 39* 29*   < > 23*   AP  --   --   --   --  193*   TP  --   --   --   --  6.8   ALB  --   --   --   --  3.5   GLOB  --   --   --   --  3.3   AGRAT  --   --   --   --  1.1    < > = values in this interval not displayed.       CBC w/Diff Recent Labs     12/19/21  0326 12/18/21  8862 12/16/21 2155   WBC 4.7 4.4* 6.5   RBC 3.02* 2.99* 3.61*   HGB 8.3* 8.4* 10.1*   HCT 27.7* 28.4* 34.6*    315 364   GRANS 53 63 68   LYMPH 32 23 18*   EOS 2 2 1      Cardiac Enzymes Recent Labs     12/16/21 2155   CPK 71   CKND1 2.8      Coagulation No results for input(s): PTP, INR, APTT, INREXT in the last 72 hours. Lipid Panel Lab Results   Component Value Date/Time    Cholesterol, total 152 10/10/2019 12:00 AM    HDL Cholesterol 46 10/10/2019 12:00 AM    LDL, calculated 86 10/10/2019 12:00 AM    VLDL, calculated 20 10/10/2019 12:00 AM    Triglyceride 101 10/10/2019 12:00 AM    CHOL/HDL Ratio 5.6 (H) 05/21/2018 06:23 AM      BNP No results for input(s): BNPP in the last 72 hours. Liver Enzymes Recent Labs     12/16/21 2155   TP 6.8   ALB 3.5   *      Thyroid Studies Lab Results   Component Value Date/Time    TSH 0.21 (L) 03/19/2021 04:00 AM                Assessment and Plan:     Candace Akbar is a 73 y.o. female with PMHx of chronic systolic and diastolic CHF, type II DM, CAD s/p stenting, HTN, HLD, COPD, gout and obesity who is admitted for CARA on CKD stage III. 1. CARA on CKD stage III  2. Acute urinary retention  3. Hypomagnesemia  4. Acute on chronic anemia-likely worsened by dilution  5. Poor oral intake  6. Chronic systolic and diastolic CHF  7. Type II DM- HbA1c 6.9 on 12/16  8. HTN  9. HLD  10. COPD without acute exacerbation  11. Hx CAD status post stenting  12. Gout  13.  Obesity    Nephrology consulted and following  Continue bicarb drip per nephrology  Continue arformoterol and ipratropium nebs  Continue home meds, restart home carvedilol  Hold Entresto, will consider restarting at lower dose when CARA resolves  Continue home long-acting insulin-15 units daily  Continue SSI with Accu-Cheks  Follow-up H&H, BMP, mag  Void trial today  Strict I's and O's  PT, OT-recommending HH safety eval     DVT prophylaxis: SQH  Diet: Diabetic    Contact: Sarah Kerryas ()    [phone] Code Status: FULL    Disposition:  Continue current care, likely home in 1-2 days       HSamara Hughes,   12/19/2021       Dragon medical dictation software was used for portions of this report. Unintended errors may occur.

## 2021-12-20 NOTE — PROGRESS NOTES
Problem: Falls - Risk of  Goal: *Absence of Falls  Description: Document Alek Segovia Fall Risk and appropriate interventions in the flowsheet. 12/20/2021 1351 by Tuyet Pereira RN  Outcome: Resolved/Met  Note: Fall Risk Interventions:  Mobility Interventions: Bed/chair exit alarm    Mentation Interventions: Adequate sleep, hydration, pain control,Bed/chair exit alarm    Medication Interventions: Bed/chair exit alarm    Elimination Interventions: Bed/chair exit alarm,Call light in reach           12/20/2021 1351 by Tuyet Pereira RN  Outcome: Progressing Towards Goal  Note: Fall Risk Interventions:  Mobility Interventions: Bed/chair exit alarm    Mentation Interventions: Adequate sleep, hydration, pain control,Bed/chair exit alarm    Medication Interventions: Bed/chair exit alarm    Elimination Interventions: Bed/chair exit alarm,Call light in reach           12/20/2021 0736 by Tuyet Pereira RN  Outcome: Progressing Towards Goal  Note: Fall Risk Interventions:  Mobility Interventions: Bed/chair exit alarm,Patient to call before getting OOB    Mentation Interventions: Update white board,Toileting rounds,More frequent rounding,Increase mobility    Medication Interventions: Bed/chair exit alarm,Teach patient to arise slowly    Elimination Interventions: Bed/chair exit alarm,Call light in reach              Problem: Diabetes Self-Management  Goal: *Disease process and treatment process  Description: Define diabetes and identify own type of diabetes; list 3 options for treating diabetes. 12/20/2021 1351 by Tuyet Pereira RN  Outcome: Resolved/Met  12/20/2021 1351 by Tuyet Pereira RN  Outcome: Progressing Towards Goal  12/20/2021 0736 by Tuyet Pereira RN  Outcome: Progressing Towards Goal  Goal: *Incorporating nutritional management into lifestyle  Description: Describe effect of type, amount and timing of food on blood glucose; list 3 methods for planning meals.   12/20/2021 1351 by Tuyet Pereira RN  Outcome: Resolved/Met  12/20/2021 1351 by Boykin Felty, RN  Outcome: Progressing Towards Goal  12/20/2021 0736 by Boykin Felty, RN  Outcome: Progressing Towards Goal  Goal: *Incorporating physical activity into lifestyle  Description: State effect of exercise on blood glucose levels. 12/20/2021 1351 by Boykin Felty, RN  Outcome: Resolved/Met  12/20/2021 1351 by Boykin Felty, RN  Outcome: Progressing Towards Goal  12/20/2021 0736 by Boykin Felty, RN  Outcome: Progressing Towards Goal  Goal: *Developing strategies to promote health/change behavior  Description: Define the ABC's of diabetes; identify appropriate screenings, schedule and personal plan for screenings. 12/20/2021 1351 by Boykin Felty, RN  Outcome: Resolved/Met  12/20/2021 1351 by Boykin Felty, RN  Outcome: Progressing Towards Goal  12/20/2021 0736 by Boykin Felty, RN  Outcome: Progressing Towards Goal  Goal: *Using medications safely  Description: State effect of diabetes medications on diabetes; name diabetes medication taking, action and side effects. 12/20/2021 1351 by Boykin Felty, RN  Outcome: Resolved/Met  12/20/2021 1351 by Boykin Felty, RN  Outcome: Progressing Towards Goal  12/20/2021 0736 by Boykin Felty, RN  Outcome: Progressing Towards Goal  Goal: *Monitoring blood glucose, interpreting and using results  Description: Identify recommended blood glucose targets  and personal targets. 12/20/2021 1351 by Boykin Felty, RN  Outcome: Resolved/Met  12/20/2021 1351 by Boykin Felty, RN  Outcome: Progressing Towards Goal  12/20/2021 0736 by Boykin Felty, RN  Outcome: Progressing Towards Goal  Goal: *Prevention, detection, treatment of acute complications  Description: List symptoms of hyper- and hypoglycemia; describe how to treat low blood sugar and actions for lowering  high blood glucose level.   12/20/2021 1351 by Boykin Felty, RN  Outcome: Resolved/Met  12/20/2021 1351 by Boykin Felty, RN  Outcome: Progressing Towards Goal  12/20/2021 0736 by Andressa Winkler RN  Outcome: Progressing Towards Goal  Goal: *Prevention, detection and treatment of chronic complications  Description: Define the natural course of diabetes and describe the relationship of blood glucose levels to long term complications of diabetes. 12/20/2021 1351 by Andressa Winkler RN  Outcome: Resolved/Met  12/20/2021 1351 by Andressa Winkler RN  Outcome: Progressing Towards Goal  12/20/2021 0736 by Andressa Winkler RN  Outcome: Progressing Towards Goal  Goal: *Developing strategies to address psychosocial issues  Description: Describe feelings about living with diabetes; identify support needed and support network  12/20/2021 1351 by Andressa Winkler RN  Outcome: Resolved/Met  12/20/2021 1351 by Andressa Winkler RN  Outcome: Progressing Towards Goal  12/20/2021 0736 by Andressa Winkler RN  Outcome: Progressing Towards Goal  Goal: *Sick day guidelines  12/20/2021 1351 by Andressa Winkler RN  Outcome: Resolved/Met  12/20/2021 1351 by Andressa Winkler RN  Outcome: Progressing Towards Goal  12/20/2021 0736 by Andressa Winkler RN  Outcome: Progressing Towards Goal  Goal: *Patient Specific Goal (EDIT GOAL, INSERT TEXT)  12/20/2021 1351 by Andressa iWnkler RN  Outcome: Resolved/Met  12/20/2021 1351 by Andressa Winkler RN  Outcome: Progressing Towards Goal  12/20/2021 0736 by Andressa Winkler RN  Outcome: Progressing Towards Goal     Problem: Pressure Injury - Risk of  Goal: *Prevention of pressure injury  Description: Document Vladislav Scale and appropriate interventions in the flowsheet.   12/20/2021 1351 by Andressa Winkler RN  Outcome: Resolved/Met  Note: Pressure Injury Interventions:  Sensory Interventions: Assess changes in LOC    Moisture Interventions: Absorbent underpads,Check for incontinence Q2 hours and as needed    Activity Interventions: PT/OT evaluation    Mobility Interventions: Pressure redistribution bed/mattress (bed type),HOB 30 degrees or less    Nutrition Interventions: Document food/fluid/supplement intake,Discuss nutritional consult with provider,Offer support with meals,snacks and hydration                  12/20/2021 1351 by Denis Hudson RN  Outcome: Progressing Towards Goal  Note: Pressure Injury Interventions:  Sensory Interventions: Assess changes in LOC    Moisture Interventions: Absorbent underpads,Check for incontinence Q2 hours and as needed    Activity Interventions: PT/OT evaluation    Mobility Interventions: Pressure redistribution bed/mattress (bed type),HOB 30 degrees or less    Nutrition Interventions: Document food/fluid/supplement intake,Discuss nutritional consult with provider,Offer support with meals,snacks and hydration                  12/20/2021 0736 by Denis Hudson RN  Outcome: Progressing Towards Goal  Note: Pressure Injury Interventions:  Sensory Interventions: Assess changes in LOC,Keep linens dry and wrinkle-free    Moisture Interventions: Absorbent underpads,Apply protective barrier, creams and emollients    Activity Interventions: Assess need for specialty bed,Pressure redistribution bed/mattress(bed type)    Mobility Interventions: Pressure redistribution bed/mattress (bed type),HOB 30 degrees or less    Nutrition Interventions: Document food/fluid/supplement intake                     Problem: Infection - Risk of, Urinary Catheter-Associated Urinary Tract Infection  Goal: *Absence of infection signs and symptoms  12/20/2021 1351 by Denis Hudson RN  Outcome: Resolved/Met  12/20/2021 1351 by Denis Hudson RN  Outcome: Progressing Towards Goal  12/20/2021 0736 by Denis Hudson RN  Outcome: Progressing Towards Goal     Problem: Acute Renal Failure: Day 6  Goal: Off Pathway (Use only if patient is Off Pathway)  12/20/2021 1351 by Denis Hudson RN  Outcome: Resolved/Met  12/20/2021 1351 by Denis Hudson RN  Outcome: Progressing Towards Goal  12/20/2021 0736 by Denis Hudson RN  Outcome: Progressing Towards Goal  Goal: Activity/Safety  12/20/2021 1351 by Tamiko Leung, RN  Outcome: Resolved/Met  12/20/2021 1351 by Tamiko Leung, RN  Outcome: Progressing Towards Goal  12/20/2021 0736 by Tamiko Leung, RN  Outcome: Progressing Towards Goal  Goal: Diagnostic Test/Procedures  12/20/2021 1351 by Tamiko Leung, RN  Outcome: Resolved/Met  12/20/2021 1351 by Tamiko Leung, RN  Outcome: Progressing Towards Goal  12/20/2021 0736 by Tamiko Leung RN  Outcome: Progressing Towards Goal  Goal: Nutrition/Diet  12/20/2021 1351 by Tamiko Leung, RN  Outcome: Resolved/Met  12/20/2021 1351 by Tamiko Leung, RN  Outcome: Progressing Towards Goal  12/20/2021 0736 by Tamiko Leung RN  Outcome: Progressing Towards Goal  Goal: Discharge Planning  12/20/2021 1351 by Tamiko Leung, RN  Outcome: Resolved/Met  12/20/2021 1351 by Tamiko Leung, RN  Outcome: Progressing Towards Goal  12/20/2021 0736 by Tamiko Leung, RN  Outcome: Progressing Towards Goal  Goal: Medications  12/20/2021 1351 by Tamiko Leung, RN  Outcome: Resolved/Met  12/20/2021 1351 by Tamiko Leung, RN  Outcome: Progressing Towards Goal  12/20/2021 0736 by Tamiko Leung, RN  Outcome: Progressing Towards Goal  Goal: Respiratory  12/20/2021 1351 by Tamiko Leung, RN  Outcome: Resolved/Met  12/20/2021 1351 by Tamiko Leung, RN  Outcome: Progressing Towards Goal  12/20/2021 0736 by Tamiko Leung, RN  Outcome: Progressing Towards Goal  Goal: Treatments/Interventions/Procedures  12/20/2021 1351 by Tamiko Leung, RN  Outcome: Resolved/Met  12/20/2021 1351 by Tamiko Leung, RN  Outcome: Progressing Towards Goal  12/20/2021 0736 by Tamiko Leung RN  Outcome: Progressing Towards Goal  Goal: Psychosocial  12/20/2021 1351 by Tamiko Leung, RN  Outcome: Resolved/Met  12/20/2021 1351 by Tamiko Leung, RN  Outcome: Progressing Towards Goal  12/20/2021 0736 by Tamiko Leung RN  Outcome: Progressing Towards Goal

## 2021-12-20 NOTE — PROGRESS NOTES
Problem: Falls - Risk of  Goal: *Absence of Falls  Description: Document Jennifer Daley Fall Risk and appropriate interventions in the flowsheet. Outcome: Progressing Towards Goal  Note: Fall Risk Interventions:  Mobility Interventions: Bed/chair exit alarm,Patient to call before getting OOB    Mentation Interventions: Update white board,Toileting rounds,More frequent rounding,Increase mobility    Medication Interventions: Bed/chair exit alarm,Teach patient to arise slowly    Elimination Interventions: Bed/chair exit alarm,Call light in reach              Problem: Diabetes Self-Management  Goal: *Disease process and treatment process  Description: Define diabetes and identify own type of diabetes; list 3 options for treating diabetes. Outcome: Progressing Towards Goal  Goal: *Incorporating nutritional management into lifestyle  Description: Describe effect of type, amount and timing of food on blood glucose; list 3 methods for planning meals. Outcome: Progressing Towards Goal  Goal: *Incorporating physical activity into lifestyle  Description: State effect of exercise on blood glucose levels. Outcome: Progressing Towards Goal  Goal: *Developing strategies to promote health/change behavior  Description: Define the ABC's of diabetes; identify appropriate screenings, schedule and personal plan for screenings. Outcome: Progressing Towards Goal  Goal: *Using medications safely  Description: State effect of diabetes medications on diabetes; name diabetes medication taking, action and side effects. Outcome: Progressing Towards Goal  Goal: *Monitoring blood glucose, interpreting and using results  Description: Identify recommended blood glucose targets  and personal targets.   Outcome: Progressing Towards Goal  Goal: *Prevention, detection, treatment of acute complications  Description: List symptoms of hyper- and hypoglycemia; describe how to treat low blood sugar and actions for lowering  high blood glucose level.  Outcome: Progressing Towards Goal  Goal: *Prevention, detection and treatment of chronic complications  Description: Define the natural course of diabetes and describe the relationship of blood glucose levels to long term complications of diabetes. Outcome: Progressing Towards Goal  Goal: *Developing strategies to address psychosocial issues  Description: Describe feelings about living with diabetes; identify support needed and support network  Outcome: Progressing Towards Goal  Goal: *Sick day guidelines  Outcome: Progressing Towards Goal  Goal: *Patient Specific Goal (EDIT GOAL, INSERT TEXT)  Outcome: Progressing Towards Goal     Problem: Pressure Injury - Risk of  Goal: *Prevention of pressure injury  Description: Document Vladislav Scale and appropriate interventions in the flowsheet.   Outcome: Progressing Towards Goal  Note: Pressure Injury Interventions:  Sensory Interventions: Assess changes in LOC,Keep linens dry and wrinkle-free    Moisture Interventions: Absorbent underpads,Apply protective barrier, creams and emollients    Activity Interventions: Assess need for specialty bed,Pressure redistribution bed/mattress(bed type)    Mobility Interventions: Pressure redistribution bed/mattress (bed type),HOB 30 degrees or less    Nutrition Interventions: Document food/fluid/supplement intake                     Problem: Infection - Risk of, Urinary Catheter-Associated Urinary Tract Infection  Goal: *Absence of infection signs and symptoms  Outcome: Progressing Towards Goal

## 2021-12-20 NOTE — DISCHARGE INSTRUCTIONS
DISCHARGE SUMMARY from Nurse    PATIENT INSTRUCTIONS:    After general anesthesia or intravenous sedation, for 24 hours or while taking prescription Narcotics:  · Limit your activities  · Do not drive and operate hazardous machinery  · Do not make important personal or business decisions  · Do  not drink alcoholic beverages  · If you have not urinated within 8 hours after discharge, please contact your surgeon on call. Report the following to your surgeon:  · Excessive pain, swelling, redness or odor of or around the surgical area  · Temperature over 100.5  · Nausea and vomiting lasting longer than 4 hours or if unable to take medications  · Any signs of decreased circulation or nerve impairment to extremity: change in color, persistent  numbness, tingling, coldness or increase pain  · Any questions    What to do at Home:  Recommended activity: Activity as tolerated    If you experience any of the following symptoms chest pain, shortness of breath, nausea/vomiting, fever, or pain unresolved by medication, please follow up with Dr. Daniel Angelo. *  Please give a list of your current medications to your Primary Care Provider. *  Please update this list whenever your medications are discontinued, doses are      changed, or new medications (including over-the-counter products) are added. *  Please carry medication information at all times in case of emergency situations. These are general instructions for a healthy lifestyle:    No smoking/ No tobacco products/ Avoid exposure to second hand smoke  Surgeon General's Warning:  Quitting smoking now greatly reduces serious risk to your health.     Obesity, smoking, and sedentary lifestyle greatly increases your risk for illness    A healthy diet, regular physical exercise & weight monitoring are important for maintaining a healthy lifestyle    You may be retaining fluid if you have a history of heart failure or if you experience any of the following symptoms: Weight gain of 3 pounds or more overnight or 5 pounds in a week, increased swelling in our hands or feet or shortness of breath while lying flat in bed. Please call your doctor as soon as you notice any of these symptoms; do not wait until your next office visit. Patient armband removed and shredded  MyChart Activation    Thank you for requesting access to [employer]. Please follow the instructions below to securely access and download your online medical record. [employer] allows you to send messages to your doctor, view your test results, renew your prescriptions, schedule appointments, and more. How Do I Sign Up? 1. In your internet browser, go to [email or web address]  2. Click on the First Time User? Click Here link in the Sign In box. You will be redirect to the New Member Sign Up page. 3. Enter your [employer] Access Code exactly as it appears below. You will not need to use this code after youve completed the sign-up process. If you do not sign up before the expiration date, you must request a new code. [employer] Access Code: Activation code not generated  Current [facility] Status: Active (This is the date your [employer] access code will )    4. Enter the last four digits of your Social Security Number (xxxx) and Date of Birth (mm/dd/yyyy) as indicated and click Submit. You will be taken to the next sign-up page. 5. Create a [employer] [facility] ID. This will be your [employer] login ID and cannot be changed, so think of one that is secure and easy to remember. 6. Create a [employer] password. You can change your password at any time. 7. Enter your Password Reset Question and Answer. This can be used at a later time if you forget your password. 8. Enter your e-mail address. You will receive e-mail notification when new information is available in [address] 9. Click Sign Up. You can now view and download portions of your medical record.   10. Click the Download Summary menu link to download a portable copy of your medical information. Additional Information    If you have questions, please visit the Frequently Asked Questions section of the [employer] website at [email or web address] Remember, [employer] is NOT to be used for urgent needs. For medical emergencies, dial 911. The discharge information has been reviewed with the patient. The patient verbalized understanding. Discharge medications reviewed with the patient and appropriate educational materials and side effects teaching were provided.   ___________________________________________________________________________________________________________________________________

## 2021-12-20 NOTE — PROGRESS NOTES
RENAL DAILY PROGRESS NOTE    Patient: Neysa Meckel               Sex: female          DOA: 12/16/2021  8:58 PM        YOB: 1950      Age:  73 y.o.        LOS:  LOS: 3 days     Subjective:     Neysa Meckel is a 73 y.o.  who presents with Acute renal failure ([facility] [address] [N17.9]  Acute renal failure (ARF) ([facility] [address] [N17.9].    Asked to evaluate for worsening renal failure,hx of crf stage 3,dm,chf ,on diuretics and entresto  Chief complains: Patient denies nausea, vomiting, chest pain, dizziness, shortness of breath or headache.  - Reviewed last 24 hrs events     Current Facility-Administered Medications   Medication Dose Route Frequency    ipratropium (ATROVENT) 0.02 % nebulizer solution 0.5 mg  0.5 mg Nebulization BID RT    albuterol-ipratropium (DUO-NEB) 2.5 MG-0.5 MG/3 ML  3 mL Nebulization Q4H PRN    [Held by provider] sacubitriL-valsartan (ENTRESTO)  mg tablet 1 Tablet  1 Tablet Oral BID    sodium chloride (NS) flush 5-40 mL  5-40 mL IntraVENous Q8H    sodium chloride (NS) flush 5-40 mL  5-40 mL IntraVENous PRN    acetaminophen (TYLENOL) tablet 650 mg  650 mg Oral Q6H PRN    Or    acetaminophen (TYLENOL) suppository 650 mg  650 mg Rectal Q6H PRN    polyethylene glycol (MIRALAX) packet 17 g  17 g Oral DAILY PRN    ondansetron (ZOFRAN ODT) tablet 4 mg  4 mg Oral Q8H PRN    Or    ondansetron (ZOFRAN) injection 4 mg  4 mg IntraVENous Q6H PRN    heparin (porcine) injection 5,000 Units  5,000 Units SubCUTAneous Q8H    aspirin chewable tablet 162 mg  162 mg Oral DAILY    atorvastatin (LIPITOR) tablet 80 mg  80 mg Oral DAILY    calcitRIOL (ROCALTROL) capsule 0.25 mcg  0.25 mcg Oral DAILY    carvediloL (COREG) tablet 25 mg  25 mg Oral BID WITH MEALS    fluticasone propionate (FLONASE) 50 mcg/actuation nasal spray 2 Spray  2 Spray Both Nostrils DAILY    insulin lispro (HUMALOG) injection   SubCUTAneous AC&HS    glucose chewable tablet 16 g  4 Tablet Oral PRN    glucagon (GLUCAGEN) injection 1 mg  1 mg IntraMUSCular PRN    dextrose (D50W) injection syrg 12.5-25 g  25-50 mL IntraVENous PRN    insulin glargine (LANTUS) injection 15 Units  15 Units SubCUTAneous DAILY    tamsulosin (FLOMAX) capsule 0.4 mg  0.4 mg Oral DAILY    arformoteroL (BROVANA) neb solution 15 mcg  15 mcg Nebulization BID RT    magnesium oxide (MAG-OX) tablet 400 mg  400 mg Oral BID       Objective:     Visit Vitals  BP (!) 146/69 (BP 1 Location: Left upper arm, BP Patient Position: Sitting)   Pulse 81   Temp 97.3 °F (36.3 °C)   Resp 18   Ht 5' 4\" (1.626 m)   Wt 95.3 kg (210 lb 1.6 oz)   SpO2 97%   BMI 36.06 kg/m²       Intake/Output Summary (Last 24 hours) at 12/20/2021 1000  Last data filed at 12/20/2021 8799  Gross per 24 hour   Intake 809 ml   Output 1050 ml   Net -241 ml       Physical Examination:     RS: Chest is bilateral equal, no wheezing / rales / crackles  CVS: S1-S2 heard,  Abdomen: Soft, Non tender, Not distended, Positive bowel sounds, no organomegaly, no CVA / supra pubic tenderness  Extremities: No edema, no cyanosis, skin is warm on touch  CNS: Awake & follows commands, CN II-XII are grossly intact. HEENT: Head is atraumatic, PERRLA, conjunctiva pink & non icteric. No JVD or carotid bruit       Data Review:      Labs:     Hematology:   Recent Labs     12/20/21  0341 12/19/21  0326 12/18/21 0219   WBC  --  4.7 4.4*   HGB 8.2* 8.3* 8.4*   HCT 27.5* 27.7* 28.4*     Chemistry:   Recent Labs     12/20/21  0341 12/19/21  0326 12/18/21  0219   BUN 54* 80* 112*   CREA 1.57* 1.90* 2.84*   CA 8.3* 8.3*  8.5 8.2*   K 4.3 4.2 4.7    142 141    113* 115*   CO2 25 21 18*   PHOS  --   --  4.6   GLU 98 96 84        Images:    XR (Most Recent). CXR reviewed by me and compared with previous CXR Results from Hospital Encounter encounter on 03/17/21    XR CHEST PORT    Narrative  Portable Chest    CPT CODE: 45930    HISTORY: Line placement.     FINDINGS:    Comparison 10/22/2020    Single frontal view of the chest. Lordotic positioning. Left internal jugular  line tip at the proximal to mid SVC. Multiple wires overlie the patient. No  pneumothorax. Heart size and mediastinal contours within normal limits. No  effusion or focal lung consolidation. Impression  No pneumothorax after left CVL placement. Tip in the region of the proximal to  mid SVC. CT (Most Recent) Results from Hospital Encounter encounter on 12/16/21    CT HEAD WO CONT    Narrative  CT HEAD WO CONT: 12/16/2021 9:56 PM    CLINICAL INFORMATION  Altered mental status. COMPARISON  18 March 2021    TECHNIQUE  Axial CT images of the brain acquired. Sagittal and coronal reformations  performed. The following CT dose reduction techniques were utilized: automated exposure  control and/or adjustment of the mA and/or kV according to patient size, and the  use of iterative reconstruction technique    FINDINGS  INTRA-AXIAL STRUCTURES  Cerebral cortex: Generalized cortical atrophy present. No evidence of  intracranial mass or hemorrhage. Normal gray-white differentiation. Moderate  confluent hypodensities within the periventricular and subcortical white matter  bilaterally. Ventricular system: Normal in size and morphology for the patient's age. Midline shift: No evidence of midline shift. Sella: The sella turcica and hypothalamic region are within normal limits. Cerebellum: The cerebellum is normal in appearance. Normal gray-white  differentiation. Brainstem: The brainstem is normal in appearance. The prepontine cistern is  within normal limits. Normal gray-white differentiation. EXTRA-AXIAL STRUCTURES  Normal in size and morphology for the patient's age. VASCULATURE  Normal.    EXTRACRANIAL SOFT TISSUE STRUCTURES  Orbits: The orbits and globes are within normal limits. Sinuses: Patent paranasal sinuses and mastoid air cells bilaterally.   Visualized upper cervical spine: Normal.    OSSEOUS STRUCTURES  No evidence of acute fracture. Impression  No intracranial hemorrhage or mass effect. Moderate sequela of chronic microvascular ischemic disease. EKG Results for orders placed or performed in visit on 08/23/18   AMB POC EKG ROUTINE W/ 12 LEADS, INTER & REP     Status: None    Impression    See progress note. I have personally reviewed the old medical records and patient's labs    Plan / Recommendation:      1. Acute/crf stage 3 related to dehydration,diuretics,entresto and urinary retention. back to baseline,stop ivf  2.hyperkalemia,resolved  3.acidosis,resolved  4.anemia ,spep is pending  5.sec hyperparathyroidism,on rocaltrol  6.hx of chf,resume bumex 1 mg daily on discharge ,but don't restart entresto  7.hx of urinary retention,voiding well without soto    D/w Dr. Armando Burgess MD  Nephrology  12/20/2021

## 2021-12-21 LAB
ALBUMIN SERPL ELPH-MCNC: 3 G/DL (ref 2.9–4.4)
ALBUMIN/GLOB SERPL: 1.6 {RATIO} (ref 0.7–1.7)
ALPHA1 GLOB SERPL ELPH-MCNC: 0.2 G/DL (ref 0–0.4)
ALPHA2 GLOB SERPL ELPH-MCNC: 0.9 G/DL (ref 0.4–1)
B-GLOBULIN SERPL ELPH-MCNC: 0.7 G/DL (ref 0.7–1.3)
GAMMA GLOB SERPL ELPH-MCNC: 0.2 G/DL (ref 0.4–1.8)
GLOBULIN SER-MCNC: 1.9 G/DL (ref 2.2–3.9)
IGA SERPL-MCNC: 42 MG/DL (ref 87–352)
IGG SERPL-MCNC: 238 MG/DL (ref 586–1602)
IGM SERPL-MCNC: 9 MG/DL (ref 26–217)
INTERPRETATION SERPL IEP-IMP: ABNORMAL
KAPPA LC FREE SER-MCNC: 26.5 MG/L (ref 3.3–19.4)
KAPPA LC FREE/LAMBDA FREE SER: 2.07 {RATIO} (ref 0.26–1.65)
LAMBDA LC FREE SERPL-MCNC: 12.8 MG/L (ref 5.7–26.3)
M PROTEIN SERPL ELPH-MCNC: ABNORMAL G/DL
PROT SERPL-MCNC: 4.9 G/DL (ref 6–8.5)

## 2022-03-07 ENCOUNTER — APPOINTMENT (OUTPATIENT)
Dept: GENERAL RADIOLOGY | Age: 72
DRG: 683 | End: 2022-03-07
Attending: PHYSICIAN ASSISTANT
Payer: MEDICARE

## 2022-03-07 ENCOUNTER — APPOINTMENT (OUTPATIENT)
Dept: CT IMAGING | Age: 72
DRG: 683 | End: 2022-03-07
Attending: PHYSICIAN ASSISTANT
Payer: MEDICARE

## 2022-03-07 ENCOUNTER — HOSPITAL ENCOUNTER (INPATIENT)
Age: 72
LOS: 4 days | Discharge: HOME | DRG: 683 | End: 2022-03-11
Attending: EMERGENCY MEDICINE | Admitting: STUDENT IN AN ORGANIZED HEALTH CARE EDUCATION/TRAINING PROGRAM
Payer: MEDICARE

## 2022-03-07 DIAGNOSIS — N17.9 AKI (ACUTE KIDNEY INJURY) (HCC): Primary | ICD-10-CM

## 2022-03-07 DIAGNOSIS — R33.9 URINARY RETENTION: ICD-10-CM

## 2022-03-07 DIAGNOSIS — E87.5 ACUTE HYPERKALEMIA: ICD-10-CM

## 2022-03-07 LAB
ALBUMIN SERPL-MCNC: 3.3 G/DL (ref 3.4–5)
ALBUMIN/GLOB SERPL: 0.8 {RATIO} (ref 0.8–1.7)
ALP SERPL-CCNC: 203 U/L (ref 45–117)
ALT SERPL-CCNC: 14 U/L (ref 13–56)
ANION GAP SERPL CALC-SCNC: 5 MMOL/L (ref 3–18)
APPEARANCE UR: ABNORMAL
AST SERPL-CCNC: 17 U/L (ref 10–38)
BACTERIA URNS QL MICRO: ABNORMAL /HPF
BASOPHILS # BLD: 0 K/UL (ref 0–0.1)
BASOPHILS NFR BLD: 0 % (ref 0–2)
BILIRUB SERPL-MCNC: 0.5 MG/DL (ref 0.2–1)
BILIRUB UR QL: NEGATIVE
BUN SERPL-MCNC: 40 MG/DL (ref 7–18)
BUN/CREAT SERPL: 18 (ref 12–20)
CALCIUM SERPL-MCNC: 9.3 MG/DL (ref 8.5–10.1)
CHLORIDE SERPL-SCNC: 116 MMOL/L (ref 100–111)
CO2 SERPL-SCNC: 18 MMOL/L (ref 21–32)
COLOR UR: YELLOW
CREAT SERPL-MCNC: 2.19 MG/DL (ref 0.6–1.3)
DIFFERENTIAL METHOD BLD: ABNORMAL
EOSINOPHIL # BLD: 0.1 K/UL (ref 0–0.4)
EOSINOPHIL NFR BLD: 2 % (ref 0–5)
ERYTHROCYTE [DISTWIDTH] IN BLOOD BY AUTOMATED COUNT: 16.9 % (ref 11.6–14.5)
GLOBULIN SER CALC-MCNC: 3.9 G/DL (ref 2–4)
GLUCOSE BLD STRIP.AUTO-MCNC: 164 MG/DL (ref 70–110)
GLUCOSE BLD STRIP.AUTO-MCNC: 83 MG/DL (ref 70–110)
GLUCOSE SERPL-MCNC: 142 MG/DL (ref 74–99)
GLUCOSE UR STRIP.AUTO-MCNC: NEGATIVE MG/DL
HCT VFR BLD AUTO: 31.3 % (ref 35–45)
HGB BLD-MCNC: 9.4 G/DL (ref 12–16)
HGB UR QL STRIP: ABNORMAL
IMM GRANULOCYTES # BLD AUTO: 0.1 K/UL (ref 0–0.04)
IMM GRANULOCYTES NFR BLD AUTO: 1 % (ref 0–0.5)
KETONES UR QL STRIP.AUTO: ABNORMAL MG/DL
LEUKOCYTE ESTERASE UR QL STRIP.AUTO: ABNORMAL
LYMPHOCYTES # BLD: 1.5 K/UL (ref 0.9–3.6)
LYMPHOCYTES NFR BLD: 22 % (ref 21–52)
MAGNESIUM SERPL-MCNC: 1.7 MG/DL (ref 1.6–2.6)
MCH RBC QN AUTO: 28.7 PG (ref 24–34)
MCHC RBC AUTO-ENTMCNC: 30 G/DL (ref 31–37)
MCV RBC AUTO: 95.4 FL (ref 78–100)
MONOCYTES # BLD: 0.8 K/UL (ref 0.05–1.2)
MONOCYTES NFR BLD: 12 % (ref 3–10)
NEUTS SEG # BLD: 4.4 K/UL (ref 1.8–8)
NEUTS SEG NFR BLD: 64 % (ref 40–73)
NITRITE UR QL STRIP.AUTO: NEGATIVE
NRBC # BLD: 0.05 K/UL (ref 0–0.01)
NRBC BLD-RTO: 0.7 PER 100 WBC
PH UR STRIP: 5 [PH] (ref 5–8)
PLATELET # BLD AUTO: 433 K/UL (ref 135–420)
PMV BLD AUTO: 10.3 FL (ref 9.2–11.8)
POTASSIUM SERPL-SCNC: 6.1 MMOL/L (ref 3.5–5.5)
PROT SERPL-MCNC: 7.2 G/DL (ref 6.4–8.2)
PROT UR STRIP-MCNC: 100 MG/DL
RBC # BLD AUTO: 3.28 M/UL (ref 4.2–5.3)
RBC #/AREA URNS HPF: ABNORMAL /HPF (ref 0–5)
SODIUM SERPL-SCNC: 139 MMOL/L (ref 136–145)
SP GR UR REFRACTOMETRY: 1.02 (ref 1–1.03)
UROBILINOGEN UR QL STRIP.AUTO: 1 EU/DL (ref 0.2–1)
WBC # BLD AUTO: 7 K/UL (ref 4.6–13.2)
WBC URNS QL MICRO: ABNORMAL /HPF (ref 0–4)

## 2022-03-07 PROCEDURE — 99285 EMERGENCY DEPT VISIT HI MDM: CPT

## 2022-03-07 PROCEDURE — 71045 X-RAY EXAM CHEST 1 VIEW: CPT

## 2022-03-07 PROCEDURE — 85025 COMPLETE CBC W/AUTO DIFF WBC: CPT

## 2022-03-07 PROCEDURE — 87086 URINE CULTURE/COLONY COUNT: CPT

## 2022-03-07 PROCEDURE — 80053 COMPREHEN METABOLIC PANEL: CPT

## 2022-03-07 PROCEDURE — 74011250636 HC RX REV CODE- 250/636: Performed by: STUDENT IN AN ORGANIZED HEALTH CARE EDUCATION/TRAINING PROGRAM

## 2022-03-07 PROCEDURE — 94640 AIRWAY INHALATION TREATMENT: CPT

## 2022-03-07 PROCEDURE — 96365 THER/PROPH/DIAG IV INF INIT: CPT

## 2022-03-07 PROCEDURE — 96368 THER/DIAG CONCURRENT INF: CPT

## 2022-03-07 PROCEDURE — 82962 GLUCOSE BLOOD TEST: CPT

## 2022-03-07 PROCEDURE — 65270000029 HC RM PRIVATE

## 2022-03-07 PROCEDURE — 96375 TX/PRO/DX INJ NEW DRUG ADDON: CPT

## 2022-03-07 PROCEDURE — 74176 CT ABD & PELVIS W/O CONTRAST: CPT

## 2022-03-07 PROCEDURE — 74011000250 HC RX REV CODE- 250: Performed by: STUDENT IN AN ORGANIZED HEALTH CARE EDUCATION/TRAINING PROGRAM

## 2022-03-07 PROCEDURE — 74011636637 HC RX REV CODE- 636/637: Performed by: STUDENT IN AN ORGANIZED HEALTH CARE EDUCATION/TRAINING PROGRAM

## 2022-03-07 PROCEDURE — 87186 SC STD MICRODIL/AGAR DIL: CPT

## 2022-03-07 PROCEDURE — 74011250636 HC RX REV CODE- 250/636: Performed by: PHYSICIAN ASSISTANT

## 2022-03-07 PROCEDURE — G0378 HOSPITAL OBSERVATION PER HR: HCPCS

## 2022-03-07 PROCEDURE — 81001 URINALYSIS AUTO W/SCOPE: CPT

## 2022-03-07 PROCEDURE — 87077 CULTURE AEROBIC IDENTIFY: CPT

## 2022-03-07 PROCEDURE — 93005 ELECTROCARDIOGRAM TRACING: CPT

## 2022-03-07 PROCEDURE — 83735 ASSAY OF MAGNESIUM: CPT

## 2022-03-07 PROCEDURE — 74011000258 HC RX REV CODE- 258: Performed by: STUDENT IN AN ORGANIZED HEALTH CARE EDUCATION/TRAINING PROGRAM

## 2022-03-07 RX ORDER — ALBUTEROL SULFATE 2.5 MG/.5ML
10 SOLUTION RESPIRATORY (INHALATION) ONCE
Status: COMPLETED | OUTPATIENT
Start: 2022-03-07 | End: 2022-03-07

## 2022-03-07 RX ORDER — SODIUM CHLORIDE 9 MG/ML
100 INJECTION, SOLUTION INTRAVENOUS ONCE
Status: COMPLETED | OUTPATIENT
Start: 2022-03-07 | End: 2022-03-08

## 2022-03-07 RX ORDER — SODIUM CHLORIDE 0.9 % (FLUSH) 0.9 %
5-40 SYRINGE (ML) INJECTION EVERY 8 HOURS
Status: DISCONTINUED | OUTPATIENT
Start: 2022-03-07 | End: 2022-03-11 | Stop reason: HOSPADM

## 2022-03-07 RX ORDER — FUROSEMIDE 10 MG/ML
40 INJECTION INTRAMUSCULAR; INTRAVENOUS
Status: COMPLETED | OUTPATIENT
Start: 2022-03-07 | End: 2022-03-07

## 2022-03-07 RX ORDER — MORPHINE SULFATE 4 MG/ML
4 INJECTION INTRAVENOUS
Status: COMPLETED | OUTPATIENT
Start: 2022-03-07 | End: 2022-03-07

## 2022-03-07 RX ORDER — DEXTROSE MONOHYDRATE 100 MG/ML
250 INJECTION, SOLUTION INTRAVENOUS ONCE
Status: COMPLETED | OUTPATIENT
Start: 2022-03-07 | End: 2022-03-07

## 2022-03-07 RX ORDER — SODIUM CHLORIDE 0.9 % (FLUSH) 0.9 %
5-40 SYRINGE (ML) INJECTION AS NEEDED
Status: DISCONTINUED | OUTPATIENT
Start: 2022-03-07 | End: 2022-03-11 | Stop reason: HOSPADM

## 2022-03-07 RX ADMIN — CALCIUM GLUCONATE 1 G: 98 INJECTION, SOLUTION INTRAVENOUS at 19:27

## 2022-03-07 RX ADMIN — MORPHINE SULFATE 4 MG: 4 INJECTION, SOLUTION INTRAMUSCULAR; INTRAVENOUS at 19:15

## 2022-03-07 RX ADMIN — DEXTROSE MONOHYDRATE 250 ML: 100 INJECTION, SOLUTION INTRAVENOUS at 19:34

## 2022-03-07 RX ADMIN — WATER 1 G: 1 INJECTION INTRAMUSCULAR; INTRAVENOUS; SUBCUTANEOUS at 19:26

## 2022-03-07 RX ADMIN — Medication 10 UNITS: at 19:14

## 2022-03-07 RX ADMIN — FUROSEMIDE 40 MG: 10 INJECTION, SOLUTION INTRAMUSCULAR; INTRAVENOUS at 19:26

## 2022-03-07 RX ADMIN — ALBUTEROL SULFATE 10 MG: 2.5 SOLUTION RESPIRATORY (INHALATION) at 19:26

## 2022-03-07 RX ADMIN — SODIUM CHLORIDE, PRESERVATIVE FREE 10 ML: 5 INJECTION INTRAVENOUS at 23:20

## 2022-03-07 RX ADMIN — SODIUM CHLORIDE 100 ML/HR: 900 INJECTION, SOLUTION INTRAVENOUS at 23:52

## 2022-03-07 NOTE — ED NOTES
Labs drawn off IV site to left [facility] #[id number] labeled at bedside and walked to lab. Pt tolerated well.

## 2022-03-07 NOTE — ED NOTES
Patient straight cath for urine using sterile technique. Clear visualization of urethra. Pt denied pain during insertion, cloudy blood tinged urine noted,20cc output. PA updated.

## 2022-03-07 NOTE — ED NOTES
Pt aware of need for urine sample. Pt unable to provide sample at this time. Pt encouraged to call for assistance when able to provide sample, verbalized understanding.

## 2022-03-07 NOTE — Clinical Note
Patient Class[de-identified] OUTPATIENT [102]   Type of Bed: Telemetry [19]   Cardiac Monitoring Required?: Yes   Admitting Diagnosis: Hyperkalemia [745507]   Admitting Physician: Sameer Suero   Attending Physician: Sameer Suero

## 2022-03-07 NOTE — ED NOTES
Assumed care of patient, A&Ox4 resp even and unlabored, NAD noted or indicated, ambulates with steady gait. Pt presents to ed with complaints of lower abd pain and urinary frequency \" for a while\", unable to recall time frame states \" its been going on for a while\". Increase pain when bearing down to pee. [Weight management counseling provided] : Weight management [Healthy eating counseling provided] : healthy eating [Activity counseling provided] : activity [Behavioral health counseling provided] : behavioral health  [None] : None [Good understanding] : Patient has a good understanding of lifestyle changes and the steps needed to achieve self management goals

## 2022-03-08 LAB
ALBUMIN SERPL-MCNC: 3 G/DL (ref 3.4–5)
ALBUMIN/GLOB SERPL: 0.8 {RATIO} (ref 0.8–1.7)
ALP SERPL-CCNC: 199 U/L (ref 45–117)
ALT SERPL-CCNC: 13 U/L (ref 13–56)
ANION GAP SERPL CALC-SCNC: 5 MMOL/L (ref 3–18)
ANION GAP SERPL CALC-SCNC: 6 MMOL/L (ref 3–18)
ANION GAP SERPL CALC-SCNC: 6 MMOL/L (ref 3–18)
ANION GAP SERPL CALC-SCNC: 8 MMOL/L (ref 3–18)
AST SERPL-CCNC: 9 U/L (ref 10–38)
ATRIAL RATE: 78 BPM
BASOPHILS # BLD: 0 K/UL (ref 0–0.1)
BASOPHILS NFR BLD: 0 % (ref 0–2)
BILIRUB SERPL-MCNC: 0.4 MG/DL (ref 0.2–1)
BUN SERPL-MCNC: 36 MG/DL (ref 7–18)
BUN SERPL-MCNC: 37 MG/DL (ref 7–18)
BUN SERPL-MCNC: 38 MG/DL (ref 7–18)
BUN SERPL-MCNC: 38 MG/DL (ref 7–18)
BUN/CREAT SERPL: 19 (ref 12–20)
BUN/CREAT SERPL: 19 (ref 12–20)
BUN/CREAT SERPL: 20 (ref 12–20)
BUN/CREAT SERPL: 20 (ref 12–20)
CALCIUM SERPL-MCNC: 9.4 MG/DL (ref 8.5–10.1)
CALCIUM SERPL-MCNC: 9.5 MG/DL (ref 8.5–10.1)
CALCIUM SERPL-MCNC: 9.6 MG/DL (ref 8.5–10.1)
CALCIUM SERPL-MCNC: 9.6 MG/DL (ref 8.5–10.1)
CALCULATED P AXIS, ECG09: 75 DEGREES
CALCULATED R AXIS, ECG10: 59 DEGREES
CALCULATED T AXIS, ECG11: 91 DEGREES
CHLORIDE SERPL-SCNC: 113 MMOL/L (ref 100–111)
CHLORIDE SERPL-SCNC: 115 MMOL/L (ref 100–111)
CO2 SERPL-SCNC: 15 MMOL/L (ref 21–32)
CO2 SERPL-SCNC: 18 MMOL/L (ref 21–32)
CO2 SERPL-SCNC: 18 MMOL/L (ref 21–32)
CO2 SERPL-SCNC: 19 MMOL/L (ref 21–32)
CREAT SERPL-MCNC: 1.83 MG/DL (ref 0.6–1.3)
CREAT SERPL-MCNC: 1.91 MG/DL (ref 0.6–1.3)
CREAT SERPL-MCNC: 1.96 MG/DL (ref 0.6–1.3)
CREAT SERPL-MCNC: 2.01 MG/DL (ref 0.6–1.3)
DIAGNOSIS, 93000: NORMAL
DIFFERENTIAL METHOD BLD: ABNORMAL
EOSINOPHIL # BLD: 0.1 K/UL (ref 0–0.4)
EOSINOPHIL NFR BLD: 2 % (ref 0–5)
ERYTHROCYTE [DISTWIDTH] IN BLOOD BY AUTOMATED COUNT: 17 % (ref 11.6–14.5)
GLOBULIN SER CALC-MCNC: 3.7 G/DL (ref 2–4)
GLUCOSE BLD STRIP.AUTO-MCNC: 104 MG/DL (ref 70–110)
GLUCOSE BLD STRIP.AUTO-MCNC: 104 MG/DL (ref 70–110)
GLUCOSE BLD STRIP.AUTO-MCNC: 107 MG/DL (ref 70–110)
GLUCOSE BLD STRIP.AUTO-MCNC: 113 MG/DL (ref 70–110)
GLUCOSE BLD STRIP.AUTO-MCNC: 117 MG/DL (ref 70–110)
GLUCOSE BLD STRIP.AUTO-MCNC: 117 MG/DL (ref 70–110)
GLUCOSE BLD STRIP.AUTO-MCNC: 133 MG/DL (ref 70–110)
GLUCOSE BLD STRIP.AUTO-MCNC: 208 MG/DL (ref 70–110)
GLUCOSE SERPL-MCNC: 119 MG/DL (ref 74–99)
GLUCOSE SERPL-MCNC: 180 MG/DL (ref 74–99)
GLUCOSE SERPL-MCNC: 95 MG/DL (ref 74–99)
GLUCOSE SERPL-MCNC: 97 MG/DL (ref 74–99)
HCT VFR BLD AUTO: 30.7 % (ref 35–45)
HGB BLD-MCNC: 9.2 G/DL (ref 12–16)
IMM GRANULOCYTES # BLD AUTO: 0.1 K/UL (ref 0–0.04)
IMM GRANULOCYTES NFR BLD AUTO: 2 % (ref 0–0.5)
LYMPHOCYTES # BLD: 1.8 K/UL (ref 0.9–3.6)
LYMPHOCYTES NFR BLD: 25 % (ref 21–52)
MAGNESIUM SERPL-MCNC: 1.7 MG/DL (ref 1.6–2.6)
MCH RBC QN AUTO: 28.7 PG (ref 24–34)
MCHC RBC AUTO-ENTMCNC: 30 G/DL (ref 31–37)
MCV RBC AUTO: 95.6 FL (ref 78–100)
MONOCYTES # BLD: 1 K/UL (ref 0.05–1.2)
MONOCYTES NFR BLD: 14 % (ref 3–10)
NEUTS SEG # BLD: 4 K/UL (ref 1.8–8)
NEUTS SEG NFR BLD: 57 % (ref 40–73)
NRBC # BLD: 0.02 K/UL (ref 0–0.01)
NRBC BLD-RTO: 0.3 PER 100 WBC
P-R INTERVAL, ECG05: 180 MS
PH BLDV: 7.23 [PH] (ref 7.32–7.42)
PLATELET # BLD AUTO: 388 K/UL (ref 135–420)
PMV BLD AUTO: 9.6 FL (ref 9.2–11.8)
POTASSIUM BLD-SCNC: 6.1 MMOL/L (ref 3.5–5.5)
POTASSIUM SERPL-SCNC: 5.6 MMOL/L (ref 3.5–5.5)
POTASSIUM SERPL-SCNC: 5.6 MMOL/L (ref 3.5–5.5)
POTASSIUM SERPL-SCNC: 5.7 MMOL/L (ref 3.5–5.5)
POTASSIUM SERPL-SCNC: 6 MMOL/L (ref 3.5–5.5)
PROT SERPL-MCNC: 6.7 G/DL (ref 6.4–8.2)
Q-T INTERVAL, ECG07: 376 MS
QRS DURATION, ECG06: 88 MS
QTC CALCULATION (BEZET), ECG08: 428 MS
RBC # BLD AUTO: 3.21 M/UL (ref 4.2–5.3)
SERVICE CMNT-IMP: ABNORMAL
SERVICE CMNT-IMP: ABNORMAL
SODIUM SERPL-SCNC: 138 MMOL/L (ref 136–145)
SODIUM SERPL-SCNC: 139 MMOL/L (ref 136–145)
SPECIMEN TYPE: ABNORMAL
VENTRICULAR RATE, ECG03: 78 BPM
WBC # BLD AUTO: 7 K/UL (ref 4.6–13.2)

## 2022-03-08 PROCEDURE — 80048 BASIC METABOLIC PNL TOTAL CA: CPT

## 2022-03-08 PROCEDURE — 80047 BASIC METABLC PNL IONIZED CA: CPT

## 2022-03-08 PROCEDURE — G0378 HOSPITAL OBSERVATION PER HR: HCPCS

## 2022-03-08 PROCEDURE — 74011000250 HC RX REV CODE- 250: Performed by: STUDENT IN AN ORGANIZED HEALTH CARE EDUCATION/TRAINING PROGRAM

## 2022-03-08 PROCEDURE — 99223 1ST HOSP IP/OBS HIGH 75: CPT | Performed by: STUDENT IN AN ORGANIZED HEALTH CARE EDUCATION/TRAINING PROGRAM

## 2022-03-08 PROCEDURE — 74011250636 HC RX REV CODE- 250/636: Performed by: EMERGENCY MEDICINE

## 2022-03-08 PROCEDURE — 74011636637 HC RX REV CODE- 636/637: Performed by: EMERGENCY MEDICINE

## 2022-03-08 PROCEDURE — 74011250636 HC RX REV CODE- 250/636: Performed by: STUDENT IN AN ORGANIZED HEALTH CARE EDUCATION/TRAINING PROGRAM

## 2022-03-08 PROCEDURE — 77030038269 HC DRN EXT URIN PURWCK BARD -A

## 2022-03-08 PROCEDURE — 74011000258 HC RX REV CODE- 258: Performed by: EMERGENCY MEDICINE

## 2022-03-08 PROCEDURE — 74011250637 HC RX REV CODE- 250/637: Performed by: EMERGENCY MEDICINE

## 2022-03-08 PROCEDURE — 36415 COLL VENOUS BLD VENIPUNCTURE: CPT

## 2022-03-08 PROCEDURE — 74011250637 HC RX REV CODE- 250/637: Performed by: STUDENT IN AN ORGANIZED HEALTH CARE EDUCATION/TRAINING PROGRAM

## 2022-03-08 PROCEDURE — 82803 BLOOD GASES ANY COMBINATION: CPT

## 2022-03-08 PROCEDURE — 85025 COMPLETE CBC W/AUTO DIFF WBC: CPT

## 2022-03-08 PROCEDURE — 94640 AIRWAY INHALATION TREATMENT: CPT

## 2022-03-08 PROCEDURE — 74011250637 HC RX REV CODE- 250/637: Performed by: INTERNAL MEDICINE

## 2022-03-08 PROCEDURE — 82962 GLUCOSE BLOOD TEST: CPT

## 2022-03-08 PROCEDURE — 65270000029 HC RM PRIVATE

## 2022-03-08 PROCEDURE — 83735 ASSAY OF MAGNESIUM: CPT

## 2022-03-08 PROCEDURE — 74011250636 HC RX REV CODE- 250/636: Performed by: INTERNAL MEDICINE

## 2022-03-08 PROCEDURE — 2709999900 HC NON-CHARGEABLE SUPPLY

## 2022-03-08 PROCEDURE — 74011000250 HC RX REV CODE- 250: Performed by: INTERNAL MEDICINE

## 2022-03-08 PROCEDURE — 74011000250 HC RX REV CODE- 250: Performed by: EMERGENCY MEDICINE

## 2022-03-08 RX ORDER — SODIUM CHLORIDE, SODIUM LACTATE, POTASSIUM CHLORIDE, CALCIUM CHLORIDE 600; 310; 30; 20 MG/100ML; MG/100ML; MG/100ML; MG/100ML
75 INJECTION, SOLUTION INTRAVENOUS CONTINUOUS
Status: DISCONTINUED | OUTPATIENT
Start: 2022-03-08 | End: 2022-03-08

## 2022-03-08 RX ORDER — DEXTROSE MONOHYDRATE 100 MG/ML
250 INJECTION, SOLUTION INTRAVENOUS ONCE
Status: DISCONTINUED | OUTPATIENT
Start: 2022-03-08 | End: 2022-03-08 | Stop reason: SDUPTHER

## 2022-03-08 RX ORDER — INSULIN LISPRO 100 [IU]/ML
INJECTION, SOLUTION INTRAVENOUS; SUBCUTANEOUS
Status: DISCONTINUED | OUTPATIENT
Start: 2022-03-08 | End: 2022-03-11 | Stop reason: HOSPADM

## 2022-03-08 RX ORDER — ACETAMINOPHEN 325 MG/1
650 TABLET ORAL
Status: DISCONTINUED | OUTPATIENT
Start: 2022-03-08 | End: 2022-03-11 | Stop reason: HOSPADM

## 2022-03-08 RX ORDER — POLYETHYLENE GLYCOL 3350 17 G/17G
17 POWDER, FOR SOLUTION ORAL DAILY PRN
Status: DISCONTINUED | OUTPATIENT
Start: 2022-03-08 | End: 2022-03-11 | Stop reason: HOSPADM

## 2022-03-08 RX ORDER — SODIUM CHLORIDE 0.9 % (FLUSH) 0.9 %
5-40 SYRINGE (ML) INJECTION AS NEEDED
Status: DISCONTINUED | OUTPATIENT
Start: 2022-03-08 | End: 2022-03-11 | Stop reason: HOSPADM

## 2022-03-08 RX ORDER — DEXTROSE MONOHYDRATE 100 MG/ML
250 INJECTION, SOLUTION INTRAVENOUS ONCE
Status: COMPLETED | OUTPATIENT
Start: 2022-03-08 | End: 2022-03-08

## 2022-03-08 RX ORDER — CHOLECALCIFEROL (VITAMIN D3) 125 MCG
5 CAPSULE ORAL
Status: DISCONTINUED | OUTPATIENT
Start: 2022-03-08 | End: 2022-03-11 | Stop reason: HOSPADM

## 2022-03-08 RX ORDER — ONDANSETRON 4 MG/1
4 TABLET, ORALLY DISINTEGRATING ORAL
Status: DISCONTINUED | OUTPATIENT
Start: 2022-03-08 | End: 2022-03-11 | Stop reason: HOSPADM

## 2022-03-08 RX ORDER — DEXTROSE MONOHYDRATE 100 MG/ML
0-250 INJECTION, SOLUTION INTRAVENOUS AS NEEDED
Status: DISCONTINUED | OUTPATIENT
Start: 2022-03-08 | End: 2022-03-11 | Stop reason: HOSPADM

## 2022-03-08 RX ORDER — TAMSULOSIN HYDROCHLORIDE 0.4 MG/1
0.4 CAPSULE ORAL DAILY
Status: DISCONTINUED | OUTPATIENT
Start: 2022-03-08 | End: 2022-03-11 | Stop reason: HOSPADM

## 2022-03-08 RX ORDER — ONDANSETRON 2 MG/ML
4 INJECTION INTRAMUSCULAR; INTRAVENOUS
Status: DISCONTINUED | OUTPATIENT
Start: 2022-03-08 | End: 2022-03-11 | Stop reason: HOSPADM

## 2022-03-08 RX ORDER — ACETAMINOPHEN 650 MG/1
650 SUPPOSITORY RECTAL
Status: DISCONTINUED | OUTPATIENT
Start: 2022-03-08 | End: 2022-03-11 | Stop reason: HOSPADM

## 2022-03-08 RX ORDER — ALBUTEROL SULFATE 0.83 MG/ML
10 SOLUTION RESPIRATORY (INHALATION) ONCE
Status: COMPLETED | OUTPATIENT
Start: 2022-03-08 | End: 2022-03-08

## 2022-03-08 RX ORDER — CARVEDILOL 25 MG/1
25 TABLET ORAL 2 TIMES DAILY WITH MEALS
Status: DISCONTINUED | OUTPATIENT
Start: 2022-03-09 | End: 2022-03-11 | Stop reason: HOSPADM

## 2022-03-08 RX ORDER — ENOXAPARIN SODIUM 100 MG/ML
40 INJECTION SUBCUTANEOUS DAILY
Status: DISCONTINUED | OUTPATIENT
Start: 2022-03-08 | End: 2022-03-11 | Stop reason: HOSPADM

## 2022-03-08 RX ORDER — SODIUM CHLORIDE 0.9 % (FLUSH) 0.9 %
5-40 SYRINGE (ML) INJECTION EVERY 8 HOURS
Status: DISCONTINUED | OUTPATIENT
Start: 2022-03-08 | End: 2022-03-11 | Stop reason: HOSPADM

## 2022-03-08 RX ORDER — SODIUM POLYSTYRENE SULFONATE 15 G/60ML
30 SUSPENSION ORAL; RECTAL
Status: COMPLETED | OUTPATIENT
Start: 2022-03-08 | End: 2022-03-08

## 2022-03-08 RX ORDER — MAGNESIUM SULFATE 100 %
4 CRYSTALS MISCELLANEOUS AS NEEDED
Status: DISCONTINUED | OUTPATIENT
Start: 2022-03-08 | End: 2022-03-11 | Stop reason: HOSPADM

## 2022-03-08 RX ORDER — CALCIUM GLUCONATE 20 MG/ML
1 INJECTION, SOLUTION INTRAVENOUS ONCE
Status: DISCONTINUED | OUTPATIENT
Start: 2022-03-08 | End: 2022-03-08 | Stop reason: CLARIF

## 2022-03-08 RX ADMIN — DEXTROSE MONOHYDRATE 250 ML: 100 INJECTION, SOLUTION INTRAVENOUS at 01:33

## 2022-03-08 RX ADMIN — ACETAMINOPHEN 650 MG: 325 TABLET ORAL at 11:49

## 2022-03-08 RX ADMIN — SODIUM CHLORIDE, PRESERVATIVE FREE 10 ML: 5 INJECTION INTRAVENOUS at 07:00

## 2022-03-08 RX ADMIN — Medication 5 MG: at 22:34

## 2022-03-08 RX ADMIN — SODIUM CHLORIDE, PRESERVATIVE FREE 10 ML: 5 INJECTION INTRAVENOUS at 22:38

## 2022-03-08 RX ADMIN — SODIUM BICARBONATE: 84 INJECTION, SOLUTION INTRAVENOUS at 16:06

## 2022-03-08 RX ADMIN — SODIUM ZIRCONIUM CYCLOSILICATE 10 G: 10 POWDER, FOR SUSPENSION ORAL at 15:37

## 2022-03-08 RX ADMIN — SODIUM CHLORIDE, POTASSIUM CHLORIDE, SODIUM LACTATE AND CALCIUM CHLORIDE 75 ML/HR: 600; 310; 30; 20 INJECTION, SOLUTION INTRAVENOUS at 11:49

## 2022-03-08 RX ADMIN — TAMSULOSIN HYDROCHLORIDE 0.4 MG: 0.4 CAPSULE ORAL at 20:20

## 2022-03-08 RX ADMIN — WATER 1 G: 1 INJECTION INTRAMUSCULAR; INTRAVENOUS; SUBCUTANEOUS at 15:37

## 2022-03-08 RX ADMIN — ACETAMINOPHEN 650 MG: 325 TABLET ORAL at 20:20

## 2022-03-08 RX ADMIN — CALCIUM GLUCONATE 1 G: 98 INJECTION, SOLUTION INTRAVENOUS at 01:32

## 2022-03-08 RX ADMIN — SODIUM POLYSTYRENE SULFONATE 30 G: 15 SUSPENSION ORAL; RECTAL at 01:34

## 2022-03-08 RX ADMIN — Medication 10 UNITS: at 01:34

## 2022-03-08 RX ADMIN — SODIUM CHLORIDE, PRESERVATIVE FREE 10 ML: 5 INJECTION INTRAVENOUS at 22:39

## 2022-03-08 RX ADMIN — ALBUTEROL SULFATE 10 MG: 2.5 SOLUTION RESPIRATORY (INHALATION) at 01:33

## 2022-03-08 NOTE — PROGRESS NOTES
Reason for Admission:  Hyperkalemia [E87.5]                 RUR Score:    N/A            Plan for utilizing home health:    no                      Likelihood of Readmission:   LOW                         Transition of Care Plan:              Initial assessment completed with patient. Cognitive status of patient: oriented to time, place, person and situation. Face sheet information confirmed:  yes. The patient designates , Johnny Alexander to participate in her discharge plan and to receive any needed information. This patient lives in a single family home with . Patient is not able to navigate steps as needed. Prior to hospitalization, patient was considered to be independent with ADLs/IADLS : yes . Patient has a current ACP document on file: no      Healthcare Decision Maker:   Primary Decision Maker: Jefe Yin - Spouse - [phone]    Click here to complete [address] including selection of the Healthcare Decision Maker Relationship (ie \"Primary\")    The  will be available to transport patient home upon discharge. The patient already has Juanis Number, Shower chair, [facility] available in the home. Patient is not currently active with home health. Patient has not stayed in a skilled nursing facility or rehab. This patient is on dialysis :no    Currently, the discharge plan is Home. The patient states that she can obtain her medications from the pharmacy, and take her medications as directed. Patient's current insurance is [employer]	[place] and [place]. Care Management Interventions  PCP Verified by CM:  Yes  Mode of Transport at Discharge: Self  Transition of Care Consult (CM Consult): Discharge Planning  Support Systems: Spouse/Significant Other  Confirm Follow Up Transport: Family  Discharge Location  Patient Expects to be Discharged to[de-identified] Home        Moraima Eddy RN - Outcomes Manager  [phone]

## 2022-03-08 NOTE — ED NOTES
Assumed care for the patient at shift change from Dr. Maisha Zheng, patient is being admitted for acute renal failure and hyperkalemia. Repeat potassium was the same, it was on the i-STAT because the chemistry machine was not working, I repeated the i-STAT to confirm and it was about the same. The patient received shifting agents around 530 and received Lasix and had had around 150 cc of urine so far. She is asymptomatic.      1:21 AM  Case discussed with Dr. Camilo Oleary, agrees on the plan of p.o. Kayexalate and recheck in the whole BMP to evaluate her kidney function as well. Continuing the same maintenance fluids and shifting agents but no further Lasix needed at this point.

## 2022-03-08 NOTE — PROGRESS NOTES
Bladder scan preformed on patient with bladder residual at 265 ml. Frye cath has been placed as per Dr Radha Gifford.  [facility] [place]

## 2022-03-08 NOTE — CONSULTS
Consult Note  Consult requested by: Jr Baum is a 73 y.o. female BLACK/ who is being seen on consult for CARA,hyperkalemia  Chief Complaint   Patient presents with    Urinary Frequency    Dizziness    Fatigue     HPI:  73 yr old with hx of urinary retention,CKD,diabetes,htn,cardiomyopathy, presents in to ED for inability to pass urine and pain on urination. Chronic SOB due to copd  Takes aldactone at home  Doesn't take bumex or entresto per her  Occ uses NSAID like aleve per her  No chest pain or swelling issues noted  Found to be hyperkalemic on presentation with K>6  Received kayexalate and currently on RL  Past Medical History:   Diagnosis Date    Abnormal WBC count 5/17/2016    Anemia     Bilateral shoulder pain 9/27/2016    Chondromalacia of both patellae     COPD (chronic obstructive pulmonary disease) ([facility] [address] 9/2015    mild-mod dz; Dr Fabricio Coates    Diabetes [facility]) 2013    Diabetic eye exam ([facility] [address] 2016    Dilated cardiomyopathy ([facility] [address]     Dyslipidemia     Gout     Heart attack ([facility] [address]     Heart attack ([address]     Heart attack ([facility] [address] 10/18/2019    History of echocardiogram 11/14/2014    Mild LVE. EF 40%. Mild, diffuse hypk. Mild LAE.   Mild MR.      Hypercholesteremia 1/08/14    Hypertension 2000    Noncompliance with medications 2/16/2016    Obesity     Orthostatic hypotension 5/17/2016    Osteoarthritis of both knees     Pain management 04/01/2016    Dr. Rodrigo Gifford Popliteal cyst, bilateral      Vitamin D deficiency 10/16/14      Past Surgical History:   Procedure Laterality Date    HX HEART CATHETERIZATION      HX TUBAL LIGATION         Social History     Socioeconomic History    Marital status:      Spouse name: Not on file    Number of children: 3    Years of education: Not on file    Highest education level: Not on file   Occupational History    Occupation: retired   Tobacco Use    Smoking status: Former Smoker Packs/day: 0.25     Years: 48.00     Pack years: 12.00     Types: Cigarettes     Quit date: 5/20/2018     Years since quitting: 3.8    Smokeless tobacco: Never Used   Substance and Sexual Activity    Alcohol use: No     Alcohol/week: 0.0 standard drinks    Drug use: No    Sexual activity: Yes     Partners: Male   Other Topics Concern     Service No    Blood Transfusions No    Caffeine Concern No    Occupational Exposure No    Hobby Hazards No    Sleep Concern No    Stress Concern No    Weight Concern No    Special Diet No    Back Care No    Exercise No    Bike Helmet No    Seat Belt Yes    Self-Exams Yes   Social History Narrative    Not on file     Social Determinants of Health     Financial Resource Strain:     Difficulty of Paying Living Expenses: Not on file   Food Insecurity:     Worried About Running Out of Food in the Last Year: Not on file    Ania of Food in the Last Year: Not on file   Transportation Needs:     Lack of Transportation (Medical): Not on file    Lack of Transportation (Non-Medical):  Not on file   Physical Activity:     Days of Exercise per Week: Not on file    Minutes of Exercise per Session: Not on file   Stress:     Feeling of Stress : Not on file   Social Connections:     Frequency of Communication with Friends and Family: Not on file    Frequency of Social Gatherings with Friends and Family: Not on file    Attends Zoroastrianism Services: Not on file    Active Member of [address] [place] [address] or Organizations: Not on file    Attends Club or Organization Meetings: Not on file    Marital Status: Not on file   Intimate Partner Violence:     Fear of Current or Ex-Partner: Not on file    Emotionally Abused: Not on file    Physically Abused: Not on file    Sexually Abused: Not on file   Housing Stability:     Unable to Pay for Housing in the Last Year: Not on file    Number of [place] in the Last Year: Not on file    Unstable Housing in the Last Year: Not on file       Family History   Problem Relation Age of Onset    Diabetes Mother     Hypertension Mother     Hypertension Father     Kidney Disease Maternal Aunt 48        Dialysis     No Known Allergies     Home Medications:     Prior to Admission Medications   Prescriptions Last Dose Informant Patient Reported? Taking? albuterol (PROVENTIL HFA, VENTOLIN HFA, PROAIR HFA) 90 mcg/actuation inhaler 3/7/2022 at Unknown time  No Yes   Sig: Take 2 Puffs by inhalation every four (4) hours as needed for Wheezing. albuterol-ipratropium (DUO-NEB) 2.5 mg-0.5 mg/3 ml nebu 3/7/2022 at Unknown time  No Yes   Sig: 3 mL by Nebulization route every six (6) hours as needed for Wheezing. aspirin 81 mg chewable tablet 3/7/2022 at Unknown time  No Yes   Sig: Take 2 Tabs by mouth daily. atorvastatin (LIPITOR) 80 mg tablet 3/7/2022 at Unknown time  No Yes   Sig: Take 1 Tab by mouth daily. bumetanide (BUMEX) 2 mg tablet Not Taking at Unknown time  No No   Sig: Take 0.5 Tablets by mouth daily. Patient not taking: Reported on 3/8/2022   calcitRIOL (ROCALTROL) 0.25 mcg capsule Not Taking at Unknown time  No No   Sig: Take 1 Cap by mouth daily. Patient not taking: Reported on 3/8/2022   carvediloL (COREG) 25 mg tablet 3/7/2022 at Unknown time  No Yes   Sig: Take 1 Tab by mouth two (2) times daily (with meals). cyanocobalamin 1,000 mcg tablet Not Taking at Unknown time  No No   Sig: Take 1 Tab by mouth daily. Patient not taking: Reported on 3/8/2022   desloratadine (CLARINEX) 5 mg tablet Unknown at Unknown time  No No   Sig: Take 1 Tab by mouth daily as needed for Allergies. hydrOXYzine HCL (ATARAX) 25 mg tablet Not Taking at Unknown time  No No   Sig: Take 1-2 tablets by mouth every 6 hours as needed.    Patient not taking: Reported on 3/8/2022   insulin detemir (LEVEMIR) 100 unit/mL injection 3/7/2022 at Unknown time  No Yes   Sig: 15 units daily for diabetes   magnesium oxide (MAG-OX) 400 mg tablet Not Taking at Unknown time  No No   Sig: Take 1 Tablet by mouth two (2) times a day. Patient not taking: Reported on 3/8/2022   mometasone (NASONEX) 50 mcg/actuation nasal spray Not Taking at Unknown time  No No   Si Sprays by Both Nostrils route daily as needed. For allergies   Patient not taking: Reported on 3/8/2022   tiZANidine (ZANAFLEX) 4 mg tablet 3/7/2022 at Unknown time  Yes Yes   Sig: Take 4 mg by mouth two (2) times daily as needed. umeclidinium-vilanterol (ANORO ELLIPTA) 62.5-25 mcg/actuation inhaler 3/7/2022 at Unknown time  No Yes   Sig: Take 1 Puff by inhalation daily. For COPD      Facility-Administered Medications: None       Current Facility-Administered Medications   Medication Dose Route Frequency    melatonin tablet 5 mg  5 mg Oral QHS    sodium chloride (NS) flush 5-40 mL  5-40 mL IntraVENous Q8H    sodium chloride (NS) flush 5-40 mL  5-40 mL IntraVENous PRN    acetaminophen (TYLENOL) tablet 650 mg  650 mg Oral Q6H PRN    Or    acetaminophen (TYLENOL) suppository 650 mg  650 mg Rectal Q6H PRN    polyethylene glycol (MIRALAX) packet 17 g  17 g Oral DAILY PRN    ondansetron (ZOFRAN ODT) tablet 4 mg  4 mg Oral Q8H PRN    Or    ondansetron (ZOFRAN) injection 4 mg  4 mg IntraVENous Q6H PRN    enoxaparin (LOVENOX) injection 40 mg  40 mg SubCUTAneous DAILY    sodium zirconium cyclosilicate (LOKELMA) powder packet 10 g  10 g Oral DAILY    sodium bicarbonate (8.4%) 150 mEq in dextrose 5% 1,000 mL infusion   IntraVENous CONTINUOUS    sodium chloride (NS) flush 5-40 mL  5-40 mL IntraVENous Q8H    sodium chloride (NS) flush 5-40 mL  5-40 mL IntraVENous PRN       Review of Systems:      Complete 10-point review of systems were obtained and discussed in length  with the patient. Complete review of systems was negative/unremarkable  except as mentioned in HPI section.   Data Review:    Labs: Results:       Chemistry Recent Labs     22  0426 22  2357 22  1700   GLU 97 95 142*    138 139 K 5.6* 6.0* 6.1*   * 115* 116*   CO2 18* 18* 18*   BUN 36* 38* 40*   CREA 1.83* 2.01* 2.19*   CA 9.6 9.6 9.3   AGAP 6 5 5   BUCR 20 19 18   AP  --   --  203*   TP  --   --  7.2   ALB  --   --  3.3*   GLOB  --   --  3.9   AGRAT  --   --  0.8      CBC w/Diff Recent Labs     03/08/22  0426 03/07/22  1700   WBC 7.0 7.0   RBC 3.21* 3.28*   HGB 9.2* 9.4*   HCT 30.7* 31.3*    433*   GRANS 57 64   LYMPH 25 22   EOS 2 2      Coagulation No results for input(s): PTP, INR, APTT, INREXT in the last 72 hours. Iron/Ferritin No results for input(s): IRON in the last 72 hours. No lab exists for component: TIBCCALC   BNP No results for input(s): BNPP in the last 72 hours. Cardiac Enzymes No results for input(s): CPK, CKND1, GODFREY in the last 72 hours. No lab exists for component: CKRMB, TROIP   Liver Enzymes Recent Labs     03/07/22  1700   TP 7.2   ALB 3.3*   *      Thyroid Studies Lab Results   Component Value Date/Time    TSH 0.21 (L) 03/19/2021 04:00 AM            Physical Assessment:     Visit Vitals  /68 (BP 1 Location: Right upper arm, BP Patient Position: Semi fowlers; Lying)   Pulse 69   Temp 97.2 °F (36.2 °C)   Resp 18   Ht 5' 4\" (1.626 m)   Wt 101.2 kg (223 lb)   SpO2 97%   BMI 38.28 kg/m²     Weight change:     Intake/Output Summary (Last 24 hours) at 3/8/2022 1327  Last data filed at 3/8/2022 0210  Gross per 24 hour   Intake 710 ml   Output 650 ml   Net 60 ml     Physical Exam:   General: comfortable, no acute distress   HEENT sclera anicteric, supple neck, no thyromegaly  CVS: S1S2 heard,  no rub  RS: + air entry b/l,   Abd: Soft, Non tender, Not distended, Positive bowel sounds, no organomegaly, no CVA / supra pubic tenderness  Neuro: non focal, awake, alert , CN II-XII are grossly intact  Extrm:no edema, no cyanosis, clubbing   Skin: no visible  Rash  Musculoskeletal: No gross joints or bone deformities     Procedures/imaging: see electronic medical records for all procedures, Xrays and details which were not copied into this note but were reviewed prior to creation of Plan      Impression & Plan:   IMPRESSION:   CARA on Chronic kidney disease stage 3 due to diabetes,hypertension. Bl cr around 1.5. Rule out urinary retention especially with hyperkalemia and acidosis which can happen with type 4 RTA related to obstructive uropathy  Non anion gap metabolic acidosis r/o RTA and obstruction  Hyperkalemia due to aldactone vs RTA vs other reasons  UTI? With UA showing blood,wbc,bacteria  Proteinuria due to diabetic nephropathy  Hx CAD and ischemic cardiomyopathy  Secondary hyperparathyroidism   PLAN:    Bladder scan STAT. Insert soto if PVR>200 cc. Communicated with nurse about this. Hx of retention in past and she gives a hx consistent with retention  UTI noted on UA. Started rocephin  Very gentle bicarbonate fluids. D/c RL for now  I and O  Daily weights  Avoid hypotension  Avoid phosphate based enemas  Avoid NSAIDs  Takes aldactone at home.  Need to stop this completely on discharge  Get PTH and iron studies         Alec Arriola MD  March 8, 2022  [facility]  Office [phone]

## 2022-03-08 NOTE — ED NOTES
Purposeful rounding completed:  Side rails up x 2:  YES  Bed in low position and wheels locked: YES  Call bell within reach: YES  Comfort addressed: YES    Toileting needs addressed: YES  Plan of care reviewed/updated with patient and or family members: YES  IV site assessed: YES  Pain assessed and addressed: YES\

## 2022-03-08 NOTE — ED PROVIDER NOTES
EMERGENCY DEPARTMENT HISTORY AND PHYSICAL EXAM    Date: 3/7/2022  Patient Name: Angelo Nesbitt    History of Presenting Illness     Chief Complaint   Patient presents with    Urinary Frequency    Dizziness    Fatigue         History Provided By: Patient    Chief Complaint: Urinary frequency, dysuria, abdominal pain, dizziness, fatigue  Duration: Couple days  Timing: Gradual  Location: Suprapubic abdomen  Quality: Aching  Severity: Moderate  Modifying Factors: None  Associated Symptoms: none       Additional History (Context): Angelo Nesbitt is a 73 y.o. female with an extensive medical history who presents today for issues listed above. Patient reports she has had urinary frequency, dysuria, suprapubic abdominal pain and now has some fatigue and dizziness. Patient has not tried thing for this at home. Reports concerns for urinary tract infection. Denies any measurable fevers or chills. Denies any nausea or vomiting. Denies any flank pain.       PCP: James Jarrell MD    Current Facility-Administered Medications   Medication Dose Route Frequency Provider Last Rate Last Admin    nystatin (MYCOSTATIN) 100,000 unit/gram powder   Topical BID Debora MARIE DO   Given at 03/09/22 1809    [START ON 3/10/2022] tiotropium-olodateroL (STIOLTO RESPIMAT) 2.5-2.5 mcg/actuation inhaler 2 Puff  2 Puff Inhalation DAILY Celine Serna DO        melatonin tablet 5 mg  5 mg Oral QHS Davey Schroeder MD   5 mg at 03/09/22 2149    sodium chloride (NS) flush 5-40 mL  5-40 mL IntraVENous Q8H Celine Serna DO   10 mL at 03/09/22 2220    sodium chloride (NS) flush 5-40 mL  5-40 mL IntraVENous PRN Celine Serna DO        acetaminophen (TYLENOL) tablet 650 mg  650 mg Oral Q6H PRN Celine Serna DO   650 mg at 03/09/22 2383    Or    acetaminophen (TYLENOL) suppository 650 mg  650 mg Rectal Q6H PRN Celine Serna DO        polyethylene glycol (MIRALAX) packet 17 g  17 g Oral DAILY PRN Sally Yanes DO        ondansetron (ZOFRAN ODT) tablet 4 mg  4 mg Oral Q8H PRN Celine Serna,         Or    ondansetron (ZOFRAN) injection 4 mg  4 mg IntraVENous Q6H PRN Celine Serna,         enoxaparin (LOVENOX) injection 40 mg  40 mg SubCUTAneous DAILY Celine Serna, DO        sodium zirconium cyclosilicate (LOKELMA) powder packet 10 g  10 g Oral DAILY Hayley Walton MD   10 g at 03/09/22 0935    sodium bicarbonate (8.4%) 150 mEq in dextrose 5% 1,000 mL infusion   IntraVENous CONTINUOUS Zahra Mojica MD 50 mL/hr at 03/09/22 2324 New Bag at 03/09/22 2324    cefTRIAXone (ROCEPHIN) 1 g in sterile water (preservative free) 10 mL IV syringe  1 g IntraVENous Q24H Zahra Mojica MD   1 g at 03/09/22 1433    tamsulosin (FLOMAX) capsule 0.4 mg  0.4 mg Oral DAILY ItapeCeline rajput, DO   0.4 mg at 03/08/22 2020    insulin lispro (HUMALOG) injection   SubCUTAneous AC&HS Celine Serna,         glucose chewable tablet 16 g  4 Tablet Oral PRN Celine Serna,         glucagon (GLUCAGEN) injection 1 mg  1 mg IntraMUSCular PRN Celine Serna,         dextrose 10% infusion 0-250 mL  0-250 mL IntraVENous PRN Celine Serna,         carvediloL (COREG) tablet 25 mg  25 mg Oral BID WITH MEALS Celine Serna DO   25 mg at 03/09/22 1808    sodium chloride (NS) flush 5-40 mL  5-40 mL IntraVENous Q8H Rosa Elena Malone MD   10 mL at 03/09/22 2221    sodium chloride (NS) flush 5-40 mL  5-40 mL IntraVENous PRN Rosa Elena Malone MD           Past History     Past Medical History:  Past Medical History:   Diagnosis Date    Abnormal WBC count 5/17/2016    Anemia     Bilateral shoulder pain 9/27/2016    Chondromalacia of both patellae     COPD [facility]ch[facility] obstructive [facility]) [facility] 9/2015    mild-mod dz; Dr Fabricio Coates    Diabetes [facility]) 2013    Diabetic eye exam ([address] 2016    Dilated cardiomyopathy [facility]     Dyslipidemia     Gout     Heart attack ([facility]     Heart attack ([facility]     Heart attack ([facility] 10/18/2019    History of echocardiogram 11/14/2014    Mild LVE. EF 40%. Mild, diffuse hypk. Mild LAE. Mild MR.      Hypercholesteremia 1/08/14    Hypertension 2000    Noncompliance with medications 2/16/2016    Obesity     Orthostatic hypotension 5/17/2016    Osteoarthritis of both knees     Pain management 04/01/2016    Dr. Lu Cerrato Popliteal cyst, bilateral      Vitamin D deficiency 10/16/14       Past Surgical History:  Past Surgical History:   Procedure Laterality Date    HX HEART CATHETERIZATION      HX TUBAL LIGATION         Family History:  Family History   Problem Relation Age of Onset    Diabetes Mother     Hypertension Mother     Hypertension Father     Kidney Disease Maternal Aunt 48        Dialysis       Social History:  Social History     Tobacco Use    Smoking status: Former Smoker     Packs/day: 0.25     Years: 48.00     Pack years: 12.00     Types: Cigarettes     Quit date: 5/20/2018     Years since quitting: 3.8    Smokeless tobacco: Never Used   Substance Use Topics    Alcohol use: No     Alcohol/week: 0.0 standard drinks    Drug use: No       Allergies:  No Known Allergies      Review of Systems   Review of Systems   Constitutional: Negative for chills and fever. HENT: Negative for congestion, rhinorrhea and sore throat. Respiratory: Negative for cough and shortness of breath. Cardiovascular: Negative for chest pain. Gastrointestinal: Positive for abdominal pain. Negative for blood in stool, constipation, diarrhea, nausea and vomiting. Genitourinary: Positive for dysuria and frequency. Negative for hematuria. Musculoskeletal: Negative for back pain and myalgias. Skin: Negative for rash and wound. Neurological: Negative for dizziness and headaches. All other systems reviewed and are negative.     All Other Systems Negative  Physical Exam     Vitals:    03/09/22 1125 03/09/22 1519 03/09/22 2016 03/09/22 2332   BP: 129/83 111/65 117/65 110/69   Pulse: 71 72 79 72   Resp: 18 18 18 18   Temp: 98.5 °F (36.9 °C) 98.8 °F (37.1 °C) 98.6 °F (37 °C) 98.1 °F (36.7 °C)   SpO2: 97% 97% 97% 96%   Weight:       Height:         Physical Exam  Vitals and nursing note reviewed. Constitutional:       General: She is in acute distress. Appearance: She is well-developed. She is not diaphoretic. HENT:      Head: Normocephalic and atraumatic. Eyes:      Conjunctiva/sclera: Conjunctivae normal.   Cardiovascular:      Rate and Rhythm: Normal rate and regular rhythm. Heart sounds: Normal heart sounds. Pulmonary:      Effort: Pulmonary effort is normal. No respiratory distress. Breath sounds: Normal breath sounds. Chest:      Chest wall: No tenderness. Abdominal:      General: Bowel sounds are normal. There is no distension. Palpations: Abdomen is soft. Tenderness: There is abdominal tenderness in the suprapubic area. There is no guarding or rebound. Musculoskeletal:         General: No deformity. Cervical back: Normal range of motion and neck supple. Skin:     General: Skin is warm and dry. Neurological:      Mental Status: She is alert and oriented to person, place, and time. Deep Tendon Reflexes: Reflexes are normal and symmetric. Diagnostic Study Results     Labs -     Recent Results (from the past 12 hour(s))   GLUCOSE, POC    Collection Time: 03/09/22 12:23 PM   Result Value Ref Range    Glucose (POC) 110 70 - 110 mg/dL   GLUCOSE, POC    Collection Time: 03/09/22  4:18 PM   Result Value Ref Range    Glucose (POC) 109 70 - 110 mg/dL   GLUCOSE, POC    Collection Time: 03/09/22  9:22 PM   Result Value Ref Range    Glucose (POC) 145 (H) 70 - 110 mg/dL       Radiologic Studies -   CT ABD PELV WO CONT   Final Result      No acute inflammation in abdomen or pelvis. Incidentals as above. XR CHEST PORT   Final Result   1. No acute infiltrate or effusion.          CT Results  (Last 48 hours)    None CXR Results  (Last 48 hours)    None            Medical Decision Making   I am the first provider for this patient. I reviewed the vital signs, available nursing notes, past medical history, past surgical history, family history and social history. Vital Signs-Reviewed the patient's vital signs. Records Reviewed: Nursing Notes and Old Medical Records     Procedures: None   Procedures    Provider Notes (Medical Decision Making):     differential diagnosis: UTI, pyelonephritis, dehydration, nephrolithiasis    Plan: We will order labs, EKG, UA and urine culture. ED Course as of 03/09/22 2350   Mon Mar 07, 2022   1843 Per RN during straight cath she heard a pop and then saw some purulent drainage. Will order CT abdomen pelvis. [CS]      ED Course User Index  [CS] Victor Manuel Reyes     7:31 PM : Pt care transferred to Dr. Royal Nelson   ,ED provider. History of patient complaint(s), available diagnostic reports and current treatment plan has been discussed thoroughly. Bedside rounding on patient occured : no .   Intended disposition of patient : ADmit  Pending diagnostics reports and/or labs (please list): CT and meds     MED RECONCILIATION:  Current Facility-Administered Medications   Medication Dose Route Frequency    nystatin (MYCOSTATIN) 100,000 unit/gram powder   Topical BID    [START ON 3/10/2022] tiotropium-olodateroL (STIOLTO RESPIMAT) 2.5-2.5 mcg/actuation inhaler 2 Puff  2 Puff Inhalation DAILY    melatonin tablet 5 mg  5 mg Oral QHS    sodium chloride (NS) flush 5-40 mL  5-40 mL IntraVENous Q8H    sodium chloride (NS) flush 5-40 mL  5-40 mL IntraVENous PRN    acetaminophen (TYLENOL) tablet 650 mg  650 mg Oral Q6H PRN    Or    acetaminophen (TYLENOL) suppository 650 mg  650 mg Rectal Q6H PRN    polyethylene glycol (MIRALAX) packet 17 g  17 g Oral DAILY PRN    ondansetron (ZOFRAN ODT) tablet 4 mg  4 mg Oral Q8H PRN    Or    ondansetron (ZOFRAN) injection 4 mg  4 mg IntraVENous Q6H PRN    enoxaparin (LOVENOX) injection 40 mg  40 mg SubCUTAneous DAILY    sodium zirconium cyclosilicate (LOKELMA) powder packet 10 g  10 g Oral DAILY    sodium bicarbonate (8.4%) 150 mEq in dextrose 5% 1,000 mL infusion   IntraVENous CONTINUOUS    cefTRIAXone (ROCEPHIN) 1 g in sterile water (preservative free) 10 mL IV syringe  1 g IntraVENous Q24H    tamsulosin (FLOMAX) capsule 0.4 mg  0.4 mg Oral DAILY    insulin lispro (HUMALOG) injection   SubCUTAneous AC&HS    glucose chewable tablet 16 g  4 Tablet Oral PRN    glucagon (GLUCAGEN) injection 1 mg  1 mg IntraMUSCular PRN    dextrose 10% infusion 0-250 mL  0-250 mL IntraVENous PRN    carvediloL (COREG) tablet 25 mg  25 mg Oral BID WITH MEALS    sodium chloride (NS) flush 5-40 mL  5-40 mL IntraVENous Q8H    sodium chloride (NS) flush 5-40 mL  5-40 mL IntraVENous PRN       Disposition:  Admit     Diagnosis     Clinical Impression:   1. CARA (acute kidney injury) ([facility]    2. Urinary retention    3. Acute hyperkalemia          \"Please note that this dictation was completed with [employer], the [employer] voice recognition software. Quite often unanticipated grammatical, syntax, homophones, and other interpretive errors are inadvertently transcribed by the computer software. Please disregard these errors. Please excuse any errors that have escaped final proofreading. \"

## 2022-03-08 NOTE — ED NOTES
Bedside and Verbal shift change report given to [facility] (oncoming nurse) by Mahamed Dejesus RN (offgoing nurse). Report included the following information SBAR, Kardex, ED Summary, Procedure Summary, Intake/Output, MAR, Accordion and Recent Results.

## 2022-03-08 NOTE — ED NOTES
TRANSFER - OUT REPORT:    Verbal report given to Armando Mansfield RN(name) on [employer]  being transferred to [phone] [facility] for routine progression of care       Report consisted of patients Situation, Background, Assessment and   Recommendations(SBAR). Information from the following report(s) ED Summary was reviewed with the receiving nurse. Lines:   Peripheral IV 03/07/22 Left Antecubital (Active)   Site Assessment Clean, dry, & intact 03/07/22 1701   Phlebitis Assessment 0 03/07/22 1701   Infiltration Assessment 0 03/07/22 1701   Dressing Status Clean, dry, & intact 03/07/22 1701        Opportunity for questions and clarification was provided.       Patient transported with:   Monitor

## 2022-03-08 NOTE — ED NOTES
Patient in stable condition at transfer via [address] [place] [address] to room 455 at [facility].

## 2022-03-08 NOTE — PROGRESS NOTES
Medicare Outpatient Observation Notice (MOON)/ [place] Outpatient Observation Notice (Michael Parnell) provided to patient/representative with verbal explanation of the notice. Time allotted for questions regarding the notice. Patient /representative provided a completed copy of the MOON/VOON notice. Copy placed on bedside chart.   Marzena Suazo RN - Outcomes Manager  [phone]

## 2022-03-09 LAB
ALBUMIN SERPL-MCNC: 2.7 G/DL (ref 3.4–5)
ANION GAP SERPL CALC-SCNC: 6 MMOL/L (ref 3–18)
BUN SERPL-MCNC: 40 MG/DL (ref 7–18)
BUN/CREAT SERPL: 21 (ref 12–20)
CALCIUM SERPL-MCNC: 9.1 MG/DL (ref 8.5–10.1)
CALCIUM SERPL-MCNC: 9.2 MG/DL (ref 8.5–10.1)
CHLORIDE SERPL-SCNC: 113 MMOL/L (ref 100–111)
CO2 SERPL-SCNC: 18 MMOL/L (ref 21–32)
CREAT SERPL-MCNC: 1.93 MG/DL (ref 0.6–1.3)
FERRITIN SERPL-MCNC: 180 NG/ML (ref 8–388)
GLUCOSE BLD STRIP.AUTO-MCNC: 109 MG/DL (ref 70–110)
GLUCOSE BLD STRIP.AUTO-MCNC: 110 MG/DL (ref 70–110)
GLUCOSE BLD STRIP.AUTO-MCNC: 125 MG/DL (ref 70–110)
GLUCOSE BLD STRIP.AUTO-MCNC: 145 MG/DL (ref 70–110)
GLUCOSE SERPL-MCNC: 117 MG/DL (ref 74–99)
IRON SATN MFR SERPL: 19 % (ref 20–50)
IRON SERPL-MCNC: 44 UG/DL (ref 50–175)
PHOSPHATE SERPL-MCNC: 4.6 MG/DL (ref 2.5–4.9)
POTASSIUM SERPL-SCNC: 5.2 MMOL/L (ref 3.5–5.5)
PTH-INTACT SERPL-MCNC: 135.1 PG/ML (ref 18.4–88)
SODIUM SERPL-SCNC: 137 MMOL/L (ref 136–145)
TIBC SERPL-MCNC: 233 UG/DL (ref 250–450)

## 2022-03-09 PROCEDURE — 74011250637 HC RX REV CODE- 250/637: Performed by: INTERNAL MEDICINE

## 2022-03-09 PROCEDURE — 82728 ASSAY OF FERRITIN: CPT

## 2022-03-09 PROCEDURE — G0378 HOSPITAL OBSERVATION PER HR: HCPCS

## 2022-03-09 PROCEDURE — 83540 ASSAY OF IRON: CPT

## 2022-03-09 PROCEDURE — 80069 RENAL FUNCTION PANEL: CPT

## 2022-03-09 PROCEDURE — 74011000250 HC RX REV CODE- 250: Performed by: INTERNAL MEDICINE

## 2022-03-09 PROCEDURE — 74011250636 HC RX REV CODE- 250/636: Performed by: INTERNAL MEDICINE

## 2022-03-09 PROCEDURE — 74011000250 HC RX REV CODE- 250: Performed by: STUDENT IN AN ORGANIZED HEALTH CARE EDUCATION/TRAINING PROGRAM

## 2022-03-09 PROCEDURE — 74011250637 HC RX REV CODE- 250/637: Performed by: EMERGENCY MEDICINE

## 2022-03-09 PROCEDURE — 74011250637 HC RX REV CODE- 250/637: Performed by: STUDENT IN AN ORGANIZED HEALTH CARE EDUCATION/TRAINING PROGRAM

## 2022-03-09 PROCEDURE — 97161 PT EVAL LOW COMPLEX 20 MIN: CPT

## 2022-03-09 PROCEDURE — 51798 US URINE CAPACITY MEASURE: CPT

## 2022-03-09 PROCEDURE — 82962 GLUCOSE BLOOD TEST: CPT

## 2022-03-09 PROCEDURE — 83970 ASSAY OF PARATHORMONE: CPT

## 2022-03-09 PROCEDURE — 97168 OT RE-EVAL EST PLAN CARE: CPT

## 2022-03-09 PROCEDURE — 36415 COLL VENOUS BLD VENIPUNCTURE: CPT

## 2022-03-09 PROCEDURE — 65270000029 HC RM PRIVATE

## 2022-03-09 PROCEDURE — 74011250636 HC RX REV CODE- 250/636: Performed by: STUDENT IN AN ORGANIZED HEALTH CARE EDUCATION/TRAINING PROGRAM

## 2022-03-09 PROCEDURE — 2709999900 HC NON-CHARGEABLE SUPPLY

## 2022-03-09 PROCEDURE — 99233 SBSQ HOSP IP/OBS HIGH 50: CPT | Performed by: STUDENT IN AN ORGANIZED HEALTH CARE EDUCATION/TRAINING PROGRAM

## 2022-03-09 RX ORDER — IPRATROPIUM BROMIDE 0.5 MG/2.5ML
0.5 SOLUTION RESPIRATORY (INHALATION)
Status: DISCONTINUED | OUTPATIENT
Start: 2022-03-10 | End: 2022-03-11 | Stop reason: HOSPADM

## 2022-03-09 RX ORDER — NYSTATIN 100000 [USP'U]/G
POWDER TOPICAL 2 TIMES DAILY
Status: DISCONTINUED | OUTPATIENT
Start: 2022-03-09 | End: 2022-03-11 | Stop reason: HOSPADM

## 2022-03-09 RX ORDER — ARFORMOTEROL TARTRATE 15 UG/2ML
15 SOLUTION RESPIRATORY (INHALATION)
Status: DISCONTINUED | OUTPATIENT
Start: 2022-03-10 | End: 2022-03-11 | Stop reason: HOSPADM

## 2022-03-09 RX ADMIN — CARVEDILOL 25 MG: 25 TABLET, FILM COATED ORAL at 09:35

## 2022-03-09 RX ADMIN — SODIUM CHLORIDE, PRESERVATIVE FREE 10 ML: 5 INJECTION INTRAVENOUS at 18:11

## 2022-03-09 RX ADMIN — NYSTATIN: 100000 POWDER TOPICAL at 18:09

## 2022-03-09 RX ADMIN — SODIUM CHLORIDE, PRESERVATIVE FREE 10 ML: 5 INJECTION INTRAVENOUS at 22:21

## 2022-03-09 RX ADMIN — ACETAMINOPHEN 650 MG: 325 TABLET ORAL at 18:08

## 2022-03-09 RX ADMIN — SODIUM ZIRCONIUM CYCLOSILICATE 10 G: 10 POWDER, FOR SUSPENSION ORAL at 09:35

## 2022-03-09 RX ADMIN — SODIUM CHLORIDE, PRESERVATIVE FREE 10 ML: 5 INJECTION INTRAVENOUS at 05:04

## 2022-03-09 RX ADMIN — SODIUM CHLORIDE, PRESERVATIVE FREE 10 ML: 5 INJECTION INTRAVENOUS at 18:12

## 2022-03-09 RX ADMIN — SODIUM BICARBONATE: 84 INJECTION, SOLUTION INTRAVENOUS at 23:24

## 2022-03-09 RX ADMIN — WATER 1 G: 1 INJECTION INTRAMUSCULAR; INTRAVENOUS; SUBCUTANEOUS at 14:33

## 2022-03-09 RX ADMIN — SODIUM CHLORIDE, PRESERVATIVE FREE 10 ML: 5 INJECTION INTRAVENOUS at 22:20

## 2022-03-09 RX ADMIN — CARVEDILOL 25 MG: 25 TABLET, FILM COATED ORAL at 18:08

## 2022-03-09 RX ADMIN — ACETAMINOPHEN 650 MG: 325 TABLET ORAL at 09:35

## 2022-03-09 RX ADMIN — Medication 5 MG: at 21:49

## 2022-03-09 NOTE — H&P
History & Physical    Patient: Aureliano Carson MRN: [id number]  CSN: [id number]    YOB: 1950  Age: 73 y.o. Sex: female      DOA: 3/7/2022    Chief Complaint:   Chief Complaint   Patient presents with    Urinary Frequency    Dizziness    Fatigue          HPI:     Aureliano Carson is a 73 y.o.  female who presented from [facility] ED today secondary to having 2 weeks of suprapubic pain, urinary retention, dysuria. Found to have UTI on UA, no signs of pyelonephritis on CT abd/pelvis. Upon interview, patient endorses suprapubic pain. Denies flank pain. History of similar event. Hospitalized Dec 2021. Past Medical History:   Diagnosis Date    Abnormal WBC count 5/17/2016    Anemia     Bilateral shoulder pain 9/27/2016    Chondromalacia of both patellae     COPD (chronic obstructive pulmonary disease) ([address] 9/2015    mild-mod dz; Dr Job Mills    Diabetes [facility]) 2013    Diabetic eye exam ([facility] 2016    Dilated cardiomyopathy ([facility]     Dyslipidemia     Gout     Heart attack ([address]     Heart attack ([address]     Heart attack ([facility] 10/18/2019    History of echocardiogram 11/14/2014    Mild LVE. EF 40%. Mild, diffuse hypk. Mild LAE.   Mild MR.      Hypercholesteremia 1/08/14    Hypertension 2000    Noncompliance with medications 2/16/2016    Obesity     Orthostatic hypotension 5/17/2016    Osteoarthritis of both knees     Pain management 04/01/2016    Dr. Arana Salvage Popliteal cyst, bilateral      Vitamin D deficiency 10/16/14       Past Surgical History:   Procedure Laterality Date    HX HEART CATHETERIZATION      HX TUBAL LIGATION         Family History   Problem Relation Age of Onset    Diabetes Mother     Hypertension Mother     Hypertension Father     Kidney Disease Maternal Aunt 48        Dialysis       Social History     Socioeconomic History    Marital status:     Number of children: 3   Occupational History    Occupation: retired   Tobacco Use    Smoking status: Former Smoker     Packs/day: 0.25     Years: 48.00     Pack years: 12.00     Types: Cigarettes     Quit date: 5/20/2018     Years since quitting: 3.8    Smokeless tobacco: Never Used   Substance and Sexual Activity    Alcohol use: No     Alcohol/week: 0.0 standard drinks    Drug use: No    Sexual activity: Yes     Partners: Male   Other Topics Concern     Service No    Blood Transfusions No    Caffeine Concern No    Occupational Exposure No    Hobby Hazards No    Sleep Concern No    Stress Concern No    Weight Concern No    Special Diet No    Back Care No    Exercise No    Bike Helmet No    Seat Belt Yes    Self-Exams Yes       Prior to Admission medications    Medication Sig Start Date End Date Taking? Authorizing Provider   albuterol-ipratropium (DUO-NEB) 2.5 mg-0.5 mg/3 ml nebu 3 mL by Nebulization route every six (6) hours as needed for Wheezing. 3/23/21  Yes Elvin No MD   atorvastatin (LIPITOR) 80 mg tablet Take 1 Tab by mouth daily. 1/25/21  Yes Maciel Whalen MD   carvediloL (COREG) 25 mg tablet Take 1 Tab by mouth two (2) times daily (with meals). 1/25/21  Yes Maciel Whalen MD   tiZANidine (ZANAFLEX) 4 mg tablet Take 4 mg by mouth two (2) times daily as needed. Yes Provider, Historical   albuterol (PROVENTIL HFA, VENTOLIN HFA, PROAIR HFA) 90 mcg/actuation inhaler Take 2 Puffs by inhalation every four (4) hours as needed for Wheezing. 9/30/19  Yes Michelet Brown MD   aspirin 81 mg chewable tablet Take 2 Tabs by mouth daily. 7/19/19  Yes Kendra Rabago NP   insulin detemir (LEVEMIR) 100 unit/mL injection 15 units daily for diabetes 7/20/17  Yes [facility]   umeclidinium-vilanterol (ANORO ELLIPTA) 62.5-25 mcg/actuation inhaler Take 1 Puff by inhalation daily. For COPD 4/26/17  Yes [place], [place]   bumetanide (BUMEX) 2 mg tablet Take 0.5 Tablets by mouth daily.   Patient not taking: Reported on 3/8/2022 12/20/21   Yoni Wren DO Hugh   magnesium oxide (MAG-OX) 400 mg tablet Take 1 Tablet by mouth two (2) times a day. Patient not taking: Reported on 3/8/2022 12/20/21   Wade Friedman DO   hydrOXYzine HCL (ATARAX) 25 mg tablet Take 1-2 tablets by mouth every 6 hours as needed. Patient not taking: Reported on 3/8/2022 4/2/21   Ewa Houser DO   calcitRIOL (ROCALTROL) 0.25 mcg capsule Take 1 Cap by mouth daily. Patient not taking: Reported on 3/8/2022 3/24/21   Kathleen Steel MD   cyanocobalamin 1,000 mcg tablet Take 1 Tab by mouth daily. Patient not taking: Reported on 3/8/2022 3/24/21   Kathleen Steel MD   desloratadine (CLARINEX) 5 mg tablet Take 1 Tab by mouth daily as needed for Allergies. 17   Valente Irizarry DNP   mometasone (NASONEX) 50 mcg/actuation nasal spray 2 Sprays by Both Nostrils route daily as needed. For allergies  Patient not taking: Reported on 3/8/2022 4/26/17   Gary Magallon DNP       No Known Allergies      Review of Systems  GENERAL: No fever, chills, malaise  HEENT: No change in vision, no earache, tinnitus, sore throat or sinus congestion. NECK: No pain or stiffness. PULMONARY: No shortness of breath, cough or wheeze. Cardiovascular: no CP  GASTROINTESTINAL: ++abdominal pain, +nausea, no vomiting or diarrhea  GENITOURINARY: +urinary frequency, +urgency, no hesitancy. MUSCULOSKELETAL: No arthralgias   DERMATOLOGIC: No rash, no itching, no lesions. ENDOCRINE:  No recent change in weight. HEMATOLOGICAL: No anemia or easy bruising or bleeding. NEUROLOGIC: No headache, seizures, numbness, tingling or weakness. Physical Exam:     Physical Exam:  Visit Vitals  /61 (BP 1 Location: Right upper arm, BP Patient Position: Semi fowlers; Lying)   Pulse 73   Temp 97.1 °F (36.2 °C)   Resp 18   Ht 5' 4\" (1.626 m)   Wt 101.2 kg (223 lb)   SpO2 96%   BMI 38.28 kg/m²      O2 Device: None (Room air)    Temp (24hrs), Av.9 °F (36.6 °C), Min:97.1 °F (36.2 °C), Max:98.4 °F (36.9 °C)    No intake/output data recorded. 03/07 0701 - 03/08 1900  In: 950 [P.O.:240; I.V.:710]  Out: 950 [Urine:950]    General:  Alert, cooperative, no distress, appears stated age. Head: Normocephalic, without obvious abnormality, atraumatic. Eyes:  Conjunctivae/corneas clear. PERRL, EOMs intact. Nose: Nares normal   Neck: Supple, symmetrical, trachea midline   Lungs:   Clear to auscultation bilaterally. Heart:  Regular rate and rhythm, S1, S2 normal.     Abdomen: Soft, +suprapubic tenderness    Extremities: Extremities normal, atraumatic, no cyanosis or edema. Pulses: 2+ and symmetric all extremities. Skin:  No rashes or lesions   Neurologic: AAOx3, No focal motor or sensory deficit. Labs Reviewed: All lab results for the last 24 hours reviewed.   Recent Results (from the past 24 hour(s))   GLUCOSE, POC    Collection Time: 03/07/22  9:53 PM   Result Value Ref Range    Glucose (POC) 83 70 - 005 mg/dL   METABOLIC PANEL, BASIC    Collection Time: 03/07/22 11:57 PM   Result Value Ref Range    Sodium 138 136 - 145 mmol/L    Potassium 6.0 (H) 3.5 - 5.5 mmol/L    Chloride 115 (H) 100 - 111 mmol/L    CO2 18 (L) 21 - 32 mmol/L    Anion gap 5 3.0 - 18 mmol/L    Glucose 95 74 - 99 mg/dL    BUN 38 (H) 7.0 - 18 MG/DL    Creatinine 2.01 (H) 0.6 - 1.3 MG/DL    BUN/Creatinine ratio 19 12 - 20      GFR est AA 30 (L) >60 ml/min/1.73m2    GFR est non-AA 25 (L) >60 ml/min/1.73m2    Calcium 9.6 8.5 - 10.1 MG/DL   POC VENOUS BLOOD GAS    Collection Time: 03/08/22 12:39 AM   Result Value Ref Range    pH, venous (POC) 7.23 (L) 7.32 - 7.42      Specimen type (POC) VENOUS BLOOD      Performed by Matthew Long     Critical value read back SUMIT E    POC CHEM8    Collection Time: 03/08/22  1:11 AM   Result Value Ref Range    GFRAA, POC Cannot be calculated >60 ml/min/1.73m2    GFRNA, POC Cannot be calculated >60 ml/min/1.73m2    Potassium, POC 6.1 (HH) 3.5 - 5.5 MMOL/L   GLUCOSE, POC    Collection Time: 03/08/22  2:13 AM   Result Value Ref Range    Glucose (POC) 208 (H) 70 - 110 mg/dL   GLUCOSE, POC    Collection Time: 03/08/22  3:37 AM   Result Value Ref Range    Glucose (POC) 113 (H) 70 - 110 mg/dL   GLUCOSE, POC    Collection Time: 03/08/22  4:21 AM   Result Value Ref Range    Glucose (POC) 104 70 - 110 mg/dL   CBC WITH AUTOMATED DIFF    Collection Time: 03/08/22  4:26 AM   Result Value Ref Range    WBC 7.0 4.6 - 13.2 K/uL    RBC 3.21 (L) 4.20 - 5.30 M/uL    HGB 9.2 (L) 12.0 - 16.0 g/dL    HCT 30.7 (L) 35.0 - 45.0 %    MCV 95.6 78.0 - 100.0 FL    MCH 28.7 24.0 - 34.0 PG    MCHC 30.0 (L) 31.0 - 37.0 g/dL    RDW 17.0 (H) 11.6 - 14.5 %    PLATELET 485 668 - 709 K/uL    MPV 9.6 9.2 - 11.8 FL    NRBC 0.3 (H) 0  WBC    ABSOLUTE NRBC 0.02 (H) 0.00 - 0.01 K/uL    NEUTROPHILS 57 40 - 73 %    LYMPHOCYTES 25 21 - 52 %    MONOCYTES 14 (H) 3 - 10 %    EOSINOPHILS 2 0 - 5 %    BASOPHILS 0 0 - 2 %    IMMATURE GRANULOCYTES 2 (H) 0.0 - 0.5 %    ABS. NEUTROPHILS 4.0 1.8 - 8.0 K/UL    ABS. LYMPHOCYTES 1.8 0.9 - 3.6 K/UL    ABS. MONOCYTES 1.0 0.05 - 1.2 K/UL    ABS. EOSINOPHILS 0.1 0.0 - 0.4 K/UL    ABS. BASOPHILS 0.0 0.0 - 0.1 K/UL    ABS. IMM.  GRANS. 0.1 (H) 0.00 - 0.04 K/UL    DF AUTOMATED     METABOLIC PANEL, BASIC    Collection Time: 03/08/22  4:26 AM   Result Value Ref Range    Sodium 139 136 - 145 mmol/L    Potassium 5.6 (H) 3.5 - 5.5 mmol/L    Chloride 115 (H) 100 - 111 mmol/L    CO2 18 (L) 21 - 32 mmol/L    Anion gap 6 3.0 - 18 mmol/L    Glucose 97 74 - 99 mg/dL    BUN 36 (H) 7.0 - 18 MG/DL    Creatinine 1.83 (H) 0.6 - 1.3 MG/DL    BUN/Creatinine ratio 20 12 - 20      GFR est AA 33 (L) >60 ml/min/1.73m2    GFR est non-AA 27 (L) >60 ml/min/1.73m2    Calcium 9.6 8.5 - 10.1 MG/DL   MAGNESIUM    Collection Time: 03/08/22  4:26 AM   Result Value Ref Range    Magnesium 1.7 1.6 - 2.6 mg/dL   GLUCOSE, POC    Collection Time: 03/08/22  6:10 AM   Result Value Ref Range    Glucose (POC) 117 (H) 70 - 110 mg/dL   GLUCOSE, POC    Collection Time: 03/08/22  9:19 AM   Result Value Ref Range    Glucose (POC) 117 (H) 70 - 110 mg/dL   GLUCOSE, POC    Collection Time: 03/08/22 12:09 PM   Result Value Ref Range    Glucose (POC) 104 70 - 867 mg/dL   METABOLIC PANEL, COMPREHENSIVE    Collection Time: 03/08/22  1:53 PM   Result Value Ref Range    Sodium 138 136 - 145 mmol/L    Potassium 5.7 (H) 3.5 - 5.5 mmol/L    Chloride 115 (H) 100 - 111 mmol/L    CO2 15 (L) 21 - 32 mmol/L    Anion gap 8 3.0 - 18 mmol/L    Glucose 180 (H) 74 - 99 mg/dL    BUN 38 (H) 7.0 - 18 MG/DL    Creatinine 1.91 (H) 0.6 - 1.3 MG/DL    BUN/Creatinine ratio 20 12 - 20      GFR est AA 32 (L) >60 ml/min/1.73m2    GFR est non-AA 26 (L) >60 ml/min/1.73m2    Calcium 9.5 8.5 - 10.1 MG/DL    Bilirubin, total 0.4 0.2 - 1.0 MG/DL    ALT (SGPT) 13 13 - 56 U/L    AST (SGOT) 9 (L) 10 - 38 U/L    Alk. phosphatase 199 (H) 45 - 117 U/L    Protein, total 6.7 6.4 - 8.2 g/dL    Albumin 3.0 (L) 3.4 - 5.0 g/dL    Globulin 3.7 2.0 - 4.0 g/dL    A-G Ratio 0.8 0.8 - 1.7     METABOLIC PANEL, BASIC    Collection Time: 03/08/22  3:55 PM   Result Value Ref Range    Sodium 138 136 - 145 mmol/L    Potassium 5.6 (H) 3.5 - 5.5 mmol/L    Chloride 113 (H) 100 - 111 mmol/L    CO2 19 (L) 21 - 32 mmol/L    Anion gap 6 3.0 - 18 mmol/L    Glucose 119 (H) 74 - 99 mg/dL    BUN 37 (H) 7.0 - 18 MG/DL    Creatinine 1.96 (H) 0.6 - 1.3 MG/DL    BUN/Creatinine ratio 19 12 - 20      GFR est AA 31 (L) >60 ml/min/1.73m2    GFR est non-AA 25 (L) >60 ml/min/1.73m2    Calcium 9.4 8.5 - 10.1 MG/DL   GLUCOSE, POC    Collection Time: 03/08/22  4:46 PM   Result Value Ref Range    Glucose (POC) 107 70 - 110 mg/dL        XR Results (most recent):  Results from Hospital Encounter encounter on 03/07/22    XR CHEST PORT    Narrative  EXAM: Chest Radiograph    INDICATION:  Dizzy    TECHNIQUE: AP view of the chest    COMPARISON: 3/18/2021, 10/22/2020, 10/27/2019    FINDINGS: No pneumothorax identified.   The lungs are clear.  No infiltrates  appreciated. No effusions identified. The cardiomediastinal silhouette is  unremarkable. The pulmonary vasculature is unremarkable. Scoliosis of the  thoracic spine. Impression  1. No acute infiltrate or effusion. CT Results  (Last 48 hours)               03/07/22 2042  CT ABD PELV WO CONT Final result    Impression:      No acute inflammation in abdomen or pelvis. Incidentals as above. Narrative:  CT Abdomen And Pelvis without Intravenous Contrast       INDICATION: Generalized abdominal pain. TECHNIQUE: 5 mm collimation axial images obtained from the diaphragm to the   level of the pubic symphysis without administration of low osmolar, nonionic   intravenous contrast.       Dose reduction techniques used: Automated exposure control, adjustment of the   mAs and/or kVp according to patient size, standardized low-dose protocol, and/or   iterative reconstruction technique. COMPARISON: March 18, 2021. ABDOMEN FINDINGS:       Lung Bases: Bibasilar atelectasis. Liver: Normal attenuation. Small hepatic cyst.       Gallbladder: Present and appears normal. No biliary ductal dilatation. Pancreas: Normal attenuation without mass or ductal dilatation. Spleen: Normal in size. No evidence of mass. .       Adrenal Glands: No evidence for mass. Kidneys:        Right: Normal size. Renal cysts. No hydronephrosis. Left:  Mildly atrophic. No cortical mass. No hydronephrosis. Lymph Nodes: No lymphadenopathy. Aorta: Atherosclerosis. PELVIS FINDINGS:        Bowel: Small Bowel: Normal in caliber with normal wall thickness. Large Bowel: Colonic diverticulosis without evidence of acute diverticulitis. Algis Broaden Appendix: Normal appendix. Bladder: Normal.       Fibroid uterus. No suspicious adnexal mass. No ascites or free air. Bones: No acute finding.                    Procedures/imaging: see electronic medical records for all procedures/Xrays and details which were not copied into this note but were reviewed prior to creation of Plan      Assessment/Plan     1) UTI - Gram negative rods   #1. Continue ceftriaxone   2) CARA on CKD III  3) Acute urinary retention    #2-3: nephrology consulted. Catheter inserted for retention. Flomax started. Bladder scans. Sodium bicarb GTT. Avoid opioids as this can worsen retention. Tylenol for pain. May need to contact urology if retention worsens. 4) Type II DM - SSI  5) Hyperkalemia   6) COPD without acute exacerbation   7) Chronic combined systolic/diastolic CHF  8) Gout  9) Obesity     Diet: regular   DVT/GI Prophylaxis: Lovenox  Code Status: full     Contact:    Discussed with patient at bedside about hospital admission and plan care. He understands and agrees. All questions answered. Disclaimer: Sections of this note are dictated using utilizing voice recognition software. Minor typographical errors may be present. If questions arise, please do not hesitate to contact me or call our department.         Stew Chang [facility] OF THE [place] [facility]ist [facility]

## 2022-03-09 NOTE — PROGRESS NOTES
Progress Note  Hospitalist Service    Patient: Dangelo Lindo MRN: [id number]   SSN: [id number]  YOB: 1950   Age: 73 y.o. Sex: female      Admit Date: 3/7/2022    LOS: 0 days   Chief Complaint   Patient presents with    Urinary Frequency    Dizziness    Fatigue       Subjective:     Patient's pain doing better today. Had soto inserted yesterday 2/2 to urinary retention. Soto was painful, has now been removed. Patient more comfortable. Review of Systems:  Patient Endorses   ---------------------------------------------------------------------------------  Constitutional: Negative for fever, chills and diaphoresis. Respiratory: Negative for cough, SOB    Cardiovascular: Negative for chest pain and palpitations. Gastrointestinal: Negative for nausea and vomiting. Genitourinary: +dysuria, +suprapubic pain, +urinary retention  Musculoskeletal: Negative for arthralgias    Skin: Negative for itching and rash. Neurological: Negative for dizziness    Objective:     Vitals:  Visit Vitals  /83 (BP 1 Location: Right upper arm, BP Patient Position: At rest)   Pulse 71   Temp 98.5 °F (36.9 °C)   Resp 18   Ht 5' 4\" (1.626 m)   Wt 101.5 kg (223 lb 12.8 oz)   SpO2 97%   BMI 38.42 kg/m²       Physical Exam:   General:  Alert, cooperative, no distress, appears stated age. Head: Normocephalic, without obvious abnormality, atraumatic. Eyes:  Conjunctivae/corneas clear. PERRL, EOMs intact. Nose: Nares normal   Neck: Supple, symmetrical, trachea midline   Lungs:   Clear to auscultation bilaterally. Heart:  Regular rate and rhythm, S1, S2 normal.     Abdomen: Soft, +suprapubic tenderness    Extremities: Extremities normal, atraumatic, no cyanosis or edema. Pulses: 2+ and symmetric all extremities. Skin:  No rashes or lesions   Neurologic: AAOx3, No focal motor or sensory deficit.          Intake and Output:  Current Shift: 03/09 0701 - 03/09 1900  In: 360 [P.O.:360]  Out: -   Last three shifts: 03/07 1901 - 03/09 0700  In: 1120.8 [P.O.:240; I.V.:880.8]  Out: 1275 [Urine:1275]    Lab/Data Review:  Recent Results (from the past 12 hour(s))   PTH INTACT    Collection Time: 03/09/22  2:27 AM   Result Value Ref Range    Calcium 9.1 8.5 - 10.1 MG/DL    PTH, Intact 135.1 (H) 18.4 - 88.0 pg/mL   IRON PROFILE    Collection Time: 03/09/22  2:27 AM   Result Value Ref Range    Iron 44 (L) 50 - 175 ug/dL    TIBC 233 (L) 250 - 450 ug/dL    Iron % saturation 19 (L) 20 - 50 %   FERRITIN    Collection Time: 03/09/22  2:27 AM   Result Value Ref Range    Ferritin 180 8 - 388 NG/ML   RENAL FUNCTION PANEL    Collection Time: 03/09/22  2:27 AM   Result Value Ref Range    Sodium 137 136 - 145 mmol/L    Potassium 5.2 3.5 - 5.5 mmol/L    Chloride 113 (H) 100 - 111 mmol/L    CO2 18 (L) 21 - 32 mmol/L    Anion gap 6 3.0 - 18 mmol/L    Glucose 117 (H) 74 - 99 mg/dL    BUN 40 (H) 7.0 - 18 MG/DL    Creatinine 1.93 (H) 0.6 - 1.3 MG/DL    BUN/Creatinine ratio 21 (H) 12 - 20      GFR est AA 31 (L) >60 ml/min/1.73m2    GFR est non-AA 26 (L) >60 ml/min/1.73m2    Calcium 9.2 8.5 - 10.1 MG/DL    Phosphorus 4.6 2.5 - 4.9 MG/DL    Albumin 2.7 (L) 3.4 - 5.0 g/dL   GLUCOSE, POC    Collection Time: 03/09/22  7:41 AM   Result Value Ref Range    Glucose (POC) 125 (H) 70 - 110 mg/dL         Key Findings or tests:       Telemetry NONE   Oxygen RA     Assessment and Plan:     1) UTI - lactose fermenting gram negative rods              #1. Continue ceftriaxone   2) CARA on CKD III  3) Acute urinary retention               #2-3: nephrology consulted. Catheter inserted for retention. Catheter now D/c'd. Flomax started. Bladder scans. Sodium bicarb GTT. Avoid opioids as this can worsen retention. Tylenol for pain. May need to contact urology if retention worsens. 4) Type II DM - SSI  5) Hyperkalemia - resolved. 6) COPD without acute exacerbation - on room air. #6.  On anora ellipta at home; pharm to sub   7) Chronic combined systolic/diastolic CHF    #7. No evidence of fluid overload. Per nephrology recs - patient should not be discharged on spironlactone 2/2 to renal function   8) Gout - not currently flared. No maintenance meds on admission. 9) Obesity      Diet: regular   DVT/GI Prophylaxis: Lovenox  Code Status: full     Dispo: Patient lives in private home with supportive . Ambulatory, no discharge needs. Await speciation/antibiotic sensitivities. Currently on day 2 of ceftriaxone.      Emily Denton DO, hospitalist   March 9, 2022

## 2022-03-09 NOTE — PROGRESS NOTES
PHYSICAL THERAPY EVALUATION AND DISCHARGE    Patient: Loy Prieto (74 y.o. female)  Date: 3/9/2022  Primary Diagnosis: Hyperkalemia [E87.5]        Precautions: standard     PLOF: Independent lives with spouse in single story home. ASSESSMENT :  Based on the objective data described below, the patient is at her baseline level mobility. Patient is independent with functional transfers and ambulation. Steady gait. Patient does not require further skilled intervention at this level of care and will sign off at this time. PLAN :  Recommendations and Planned Interventions:   No formal PT needs identified at this time. Discharge Recommendations: None  Further Equipment Recommendations for Discharge: N/A     SUBJECTIVE:   Patient stated This catheter hurts.     OBJECTIVE DATA SUMMARY:     Past Medical History:   Diagnosis Date    Abnormal WBC count 5/17/2016    Anemia     Bilateral shoulder pain 9/27/2016    Chondromalacia of both patellae     COPD (chronic obstructive pulmonary disease) ([address] 9/2015    mild-mod dz; Dr Hastings [place]    Diabetes [facility]) 2013    [facility] exam [facility] [address] 2016    Dilated cardiomyopathy [address] [address]     Dyslipidemia     Gout     Heart attack ([address]     Heart attack ([address]     Heart attack ([facility] [address] 10/18/2019    History of echocardiogram 11/14/2014    Mild LVE. EF 40%. Mild, diffuse hypk. Mild LAE.   Mild MR.      Hypercholesteremia 1/08/14    Hypertension 2000    Noncompliance with medications 2/16/2016    Obesity     Orthostatic hypotension 5/17/2016    Osteoarthritis of both knees     Pain management 04/01/2016    Dr. Abdulaziz Tariq Popliteal cyst, bilateral      Vitamin D deficiency 10/16/14     Past Surgical History:   Procedure Laterality Date    HX HEART CATHETERIZATION      HX TUBAL LIGATION       Barriers to Learning/Limitations: None  Compensate with: N/A  Home Situation:   Home Situation  Home Environment: Private residence  # Steps to Enter: 4  Rails to Enter: Yes  Hand Rails : Bilateral  One/Two Story Residence: One story  Living Alone: No  Support Systems: Spouse/Significant Other  Patient Expects to be Discharged to[de-identified] Home  Current DME Used/Available at Home: Cane, straight,Commode, bedside,Grab bars,Shower chair,Walker, rolling  Tub or Shower Type: Tub/Shower combination  Critical Behavior:  Neurologic State: Alert  Orientation Level: Oriented X4  Cognition: Follows commands  Safety/Judgement: Fall prevention  Psychosocial  Patient Behaviors: Anxious; Cooperative                 Strength:    Strength: Within functional limits                    Tone & Sensation:   Tone: Normal              Sensation: Intact               Range Of Motion:  AROM: Within functional limits                        Functional Mobility:  Bed Mobility:     Supine to Sit: Modified independent  Sit to Supine: Modified independent  Scooting: Modified independent  Transfers:  Sit to Stand: Modified independent  Stand to Sit: Modified independent            Balance:   Sitting: Impaired  Sitting - Static: Good (unsupported)  Sitting - Dynamic: Fair (occasional)  Standing: Intact; Without support    Ambulation/Gait Training:  Gait Description (WDL): Within defined limits         Pain:  Pain level pre-treatment: 10/10  Frye cath  Pain level post-treatment: 10/10  Pain Intervention(s): Medication (see MAR); Rest, Ice, Repositioning   Response to intervention: Nurse notified, See doc flow    Activity Tolerance:   Good  Please refer to the flowsheet for vital signs taken during this treatment. After treatment:   []         Patient left in no apparent distress sitting up in chair  [x]         Patient left in no apparent distress in bed  [x]         Call bell left within reach  [x]         Nursing notified  []         Caregiver present  []         Bed alarm activated  []         SCDs applied    COMMUNICATION/EDUCATION:   [x]         Role of Physical Therapy in the acute care setting.   [x] Fall prevention education was provided and the patient/caregiver indicated understanding. []         Patient/family have participated as able in goal setting and plan of care. []         Patient/family agree to work toward stated goals and plan of care. []         Patient understands intent and goals of therapy, but is neutral about his/her participation. []         Patient is unable to participate in goal setting/plan of care: ongoing with therapy staff.  []         Other:     Thank you for this referral.  Franny Neumann, PT   Time Calculation: 14 mins      Eval Complexity: History: LOW Complexity : Zero comorbidities / personal factors that will impact the outcome / POCExam:LOW Complexity : 1-2 Standardized tests and measures addressing body structure, function, activity limitation and / or participation in recreation  Presentation: LOW Complexity : Stable, uncomplicated  Clinical Decision Making:Low Complexity    Overall Complexity:LOW

## 2022-03-09 NOTE — PROGRESS NOTES
Problem: Self Care Deficits Care Plan (Adult)  Goal: *Acute Goals and Plan of Care (Insert Text)  Description: Occupational Therapy Goals  Initiated 3/9/2022 within 7 day(s). 1.  Patient will perform  lower body bathing with modified independence using adaptive equipment as needed. 2.  Patient will perform lower body dressing with modified independence using adaptive equipment as needed. 3.  Patient will utilize energy conservation techniques during functional activities with min verbal cues. Prior Level of Function: independent with ADLs and functional mobility without AD     Outcome: Progressing Towards Goal   OCCUPATIONAL THERAPY EVALUATION    Patient: Kirk Velasquez (70 y.o. female)  Date: 3/9/2022  Primary Diagnosis: Hyperkalemia [E87.5]        Precautions:      PLOF: independent with ADLs and functional mobility without AD    ASSESSMENT :  Nursing/RN cleared for pt to participate in OT evaluation and tx session. Patient presents lying semi-reclined in bed, reports A & O x , c/o 10/10 abdomen and site of soto catheter insertion pain-nursing present and aware during tx session of pt's limitations e.g. LB ADLs with inability to reach feet using bend forward and/or cross leg method to doff/don socks. Bed mobility: mod I supine <-> sit edge of bed with additional time d/t pain. Toilet transfer: mod I w/o AD, good balance, assist with IV pole and soto catheter bag mgmt. Patient lying semi reclined in bed, call bell within reach & pt verbalized understanding and provided return demonstration to utilize for assist e.g. functional transfers in order to prevent falls. Patient requesting to remove soto catheter bag to nurse, reports she can manage LB ADLs if soto catheter removed. OT to tx for 1 session for issue and instruction of adaptive equipment for LB ADLs as needed and energy conservation techniques to incorporate into ADL routine and daily tasks for safe d/c home.      Patient will benefit from skilled intervention to address the above impairments. Patient's rehabilitation potential is considered to be Excellent  Factors which may influence rehabilitation potential include:   []             None noted  []             Mental ability/status  [x]             Medical condition  []             Home/family situation and support systems  []             Safety awareness  [x]             Pain tolerance/management  []             Other:      PLAN :  Recommendations and Planned Interventions:   [x]               Self Care Training                  [x]      Therapeutic Activities  [x]               Functional Mobility Training   []      Cognitive Retraining  [x]               Therapeutic Exercises           [x]      Endurance Activities  [x]               Balance Training                    [x]      Neuromuscular Re-Education  []               Visual/Perceptual Training     [x]      Home Safety Training  [x]               Patient Education                   [x]      Family Training/Education  []               Other (comment):    Frequency/Duration: Patient will be followed by occupational therapy 1 time a week to address goals. Discharge Recommendations: Home Health for home safety assessment  Further Equipment Recommendations for Discharge: patient has all recommended DME     SUBJECTIVE:   Patient stated I want this soto catheter taken out today.     OBJECTIVE DATA SUMMARY:     Past Medical History:   Diagnosis Date    Abnormal WBC count 5/17/2016    Anemia     Bilateral shoulder pain 9/27/2016    Chondromalacia of both patellae     COPD (chronic obstructive pulmonary disease) ([facility] [address] 9/2015    mild-mod dz; Dr Migue Gomes    Diabetes [facility]) 2013    Diabetic eye exam ([facility] [address] 2016    Dilated cardiomyopathy ([facility] [address]     Dyslipidemia     Gout     Heart attack ([facility] [address]     Heart attack ([facility] [address]     Heart attack ([facility] [address] 10/18/2019    History of echocardiogram 11/14/2014    Mild LVE. EF 40%. Mild, diffuse hypk. Mild LAE. Mild MR. Hypercholesteremia 1/08/14    Hypertension 2000    Noncompliance with medications 2/16/2016    Obesity     Orthostatic hypotension 5/17/2016    Osteoarthritis of both knees     Pain management 04/01/2016    Dr. Kj Arias     Popliteal cyst, bilateral      Vitamin D deficiency 10/16/14     Past Surgical History:   Procedure Laterality Date    HX HEART CATHETERIZATION      HX TUBAL LIGATION       Barriers to Learning/Limitations: None  Compensate with: visual, verbal, tactile, kinesthetic cues/model    Home Situation:   Home Situation  Home Environment: Private residence  # Steps to Enter: 4  Rails to Enter: Yes  Hand Rails : Bilateral  One/Two Story Residence: One story  Living Alone: No  Support Systems: Spouse/Significant Other  Patient Expects to be Discharged to[de-identified] Home  Current DME Used/Available at Home: Cane, straight,Commode, bedside,Grab bars,Shower chair,Walker, rolling  Tub or Shower Type: Tub/Shower combination  [x]  Right hand dominant   []  Left hand dominant    Cognitive/Behavioral Status:  Neurologic State: Alert  Orientation Level: Oriented X4  Cognition: Follows commands  Safety/Judgement: Fall prevention    Skin: appears intact  Edema: none noted    Vision/Perceptual:  appears intact, able to tell correct time on wall clock       Coordination: BUE  Coordination: Within functional limits  Fine Motor Skills-Upper: Left Intact; Right Intact    Gross Motor Skills-Upper: Left Intact; Right Intact    Balance:  Sitting: Impaired  Sitting - Static: Good (unsupported)  Sitting - Dynamic: Fair (occasional)  Standing: Intact; Without support    Strength: BUE  Strength:  Within functional limits     Tone & Sensation: BUE  Tone: Normal  Sensation: Intact     Range of Motion: BUE  AROM: Within functional limits     Functional Mobility and Transfers for ADLs:  Bed Mobility:     Supine to Sit: Modified independent  Sit to Supine: Modified independent  Scooting: Modified independent  Transfers:  Sit to Stand: Modified independent  Stand to Sit: Modified independent     Toilet Transfer : Modified independent     ADL Assessment:   Feeding: Modified independent    Oral Facial Hygiene/Grooming: Modified Independent    Bathing: Minimum assistance    Upper Body Dressing: Modified independent    Lower Body Dressing: Minimum assistance    Toileting: Modified independent     Cognitive Retraining  Safety/Judgement: Fall prevention    Pain:  Pain level pre-treatment: 10/10 abdomen and site of soto catheter insertion per pt report  Pain level post-treatment: 10/10 abdomen and site of soto catheter insertion per pt report  Pain Intervention(s): Medication (see MAR); Rest, Repositioning   Response to intervention: Nurse notified, See doc flow    Activity Tolerance:   fair  Please refer to the flowsheet for vital signs taken during this treatment. After treatment:   [] Patient left in no apparent distress sitting up in chair  [x] Patient left in no apparent distress in bed  [x] Call bell left within reach  [x] Nursing notified  [] Caregiver present  [x] Bed alarm activated    COMMUNICATION/EDUCATION:   [x] Role of Occupational Therapy in the acute care setting  [x] Home safety education was provided and the patient/caregiver indicated understanding. [x] Patient/family have participated as able in goal setting and plan of care. [x] Patient/family agree to work toward stated goals and plan of care. [] Patient understands intent and goals of therapy, but is neutral about his/her participation. [] Patient is unable to participate in goal setting and plan of care. Thank you for this referral.  Perfecto Viera  Time Calculation: 11 mins    Eval Complexity: History: MEDIUM Complexity : Expanded review of history including physical, cognitive and psychosocial  history ;    Examination: MEDIUM Complexity : 3-5 performance deficits relating to physical, cognitive , or psychosocial skils that result in activity limitations and / or participation restrictions; Decision Making:MEDIUM Complexity : Patient may present with comorbidities that affect occupational performnce.  Miniml to moderate modification of tasks or assistance (eg, physical or verbal ) with assesment(s) is necessary to enable patient to complete evaluation

## 2022-03-09 NOTE — PROGRESS NOTES
RENAL DAILY PROGRESS NOTE              Subjective:       Complaint: c/o pain with catheter. Wants it out  Overnight events noted  no nausea, vomiting, chest pain    IMPRESSION:   · CARA on Chronic kidney disease stage 3 due to diabetes,hypertension. Bl cr around 1.5. Rule out urinary retention especially with hyperkalemia and acidosis which can happen with type 4 RTA related to obstructive uropathy  · Non anion gap metabolic acidosis r/o RTA and obstruction  · Hyperkalemia due to aldactone vs RTA vs other reasons  · UTI? With UA showing blood,wbc,bacteria  · Proteinuria due to diabetic nephropathy  · Hx CAD and ischemic cardiomyopathy  · Secondary hyperparathyroidism   PLAN:   ·  d/c soto. Regular bladder scans. On flomax  · Very gentle bicarbonate fluids to continue  · I and O  · Daily weights  · Avoid hypotension  · Avoid phosphate based enemas  · Avoid NSAIDs  · Takes aldactone at home.  Need to stop this completely on discharge          Current Facility-Administered Medications   Medication Dose Route Frequency    melatonin tablet 5 mg  5 mg Oral QHS    sodium chloride (NS) flush 5-40 mL  5-40 mL IntraVENous Q8H    sodium chloride (NS) flush 5-40 mL  5-40 mL IntraVENous PRN    acetaminophen (TYLENOL) tablet 650 mg  650 mg Oral Q6H PRN    Or    acetaminophen (TYLENOL) suppository 650 mg  650 mg Rectal Q6H PRN    polyethylene glycol (MIRALAX) packet 17 g  17 g Oral DAILY PRN    ondansetron (ZOFRAN ODT) tablet 4 mg  4 mg Oral Q8H PRN    Or    ondansetron (ZOFRAN) injection 4 mg  4 mg IntraVENous Q6H PRN    enoxaparin (LOVENOX) injection 40 mg  40 mg SubCUTAneous DAILY    sodium zirconium cyclosilicate (LOKELMA) powder packet 10 g  10 g Oral DAILY    sodium bicarbonate (8.4%) 150 mEq in dextrose 5% 1,000 mL infusion   IntraVENous CONTINUOUS    cefTRIAXone (ROCEPHIN) 1 g in sterile water (preservative free) 10 mL IV syringe  1 g IntraVENous Q24H    tamsulosin (FLOMAX) capsule 0.4 mg  0.4 mg Oral DAILY    insulin lispro (HUMALOG) injection   SubCUTAneous AC&HS    glucose chewable tablet 16 g  4 Tablet Oral PRN    glucagon (GLUCAGEN) injection 1 mg  1 mg IntraMUSCular PRN    dextrose 10% infusion 0-250 mL  0-250 mL IntraVENous PRN    carvediloL (COREG) tablet 25 mg  25 mg Oral BID WITH MEALS    sodium chloride (NS) flush 5-40 mL  5-40 mL IntraVENous Q8H    sodium chloride (NS) flush 5-40 mL  5-40 mL IntraVENous PRN       Review of Symptoms: comprehensive ROS negative except above. Objective:     Patient Vitals for the past 24 hrs:   Temp Pulse Resp BP SpO2   03/09/22 1125 98.5 °F (36.9 °C) 71 18 129/83 97 %   03/09/22 0742 98.3 °F (36.8 °C) 85 17 109/69 98 %   03/09/22 0506 97.3 °F (36.3 °C) 74 18 124/63 99 %   03/08/22 2322 97.1 °F (36.2 °C) 75 16 121/65 98 %   03/08/22 1945 97.3 °F (36.3 °C) 78 18 122/64 99 %   03/08/22 1544 97.1 °F (36.2 °C) 73 18 115/61 96 %   03/08/22 1204 97.2 °F (36.2 °C) 69 18 131/68 97 %        Weight change: 0.363 kg (12.8 oz)     03/07 1901 - 03/09 0700  In: 1120.8 [P.O.:240;  I.V.:880.8]  Out: 1275 [Urine:1275]    Intake/Output Summary (Last 24 hours) at 3/9/2022 1202  Last data filed at 3/9/2022 0930  Gross per 24 hour   Intake 770.8 ml   Output 325 ml   Net 445.8 ml     Physical Exam:   General: in distress  HEENT sclera anicteric, supple neck, no thyromegaly  CVS: S1S2 heard,  no rub  RS: + air entry b/l,   Abd: Soft, Non tender, Not distended, Positive bowel sounds, no organomegaly, no CVA / supra pubic tenderness  Neuro: non focal, awake, alert , CN II-XII are grossly intact  Extrm: noo edema, no cyanosis, clubbing   Skin: no visible  Rash  Musculoskeletal: No gross joints or bone deformities         Data Review:     LABS:   Hematology:   Recent Labs     03/08/22  0426 03/07/22  1700   WBC 7.0 7.0   HGB 9.2* 9.4*   HCT 30.7* 31.3*     Chemistry:   Recent Labs     03/09/22  0227 03/08/22  1555 03/08/22  1353 03/08/22  0426 03/07/22  2357 03/07/22  1700   BUN 40* 37* 38* 36* 38* 40*   CREA 1.93* 1.96* 1.91* 1.83* 2.01* 2.19*   CA 9.1  9.2 9.4 9.5 9.6 9.6 9.3   ALB 2.7*  --  3.0*  --   --  3.3*   K 5.2 5.6* 5.7* 5.6* 6.0* 6.1*    138 138 139 138 139   * 113* 115* 115* 115* 116*   CO2 18* 19* 15* 18* 18* 18*   PHOS 4.6  --   --   --   --   --    * 119* 180* 97 95 142*            Procedures/imaging: see electronic medical records for all procedures, Xrays and details which were not copied into this note but were reviewed prior to creation of Plan          Assessment & Plan:     See above        Rowena Polo MD  3/9/2022  12:02 PM

## 2022-03-09 NOTE — PROGRESS NOTES
Problem: Falls - Risk of  Goal: *Absence of Falls  Description: Document Clearence Skates Fall Risk and appropriate interventions in the flowsheet. 3/9/2022 0818 by Christopher Mcgee RN  Outcome: Progressing Towards Goal  Note: Fall Risk Interventions:            Medication Interventions: Teach patient to arise slowly    Elimination Interventions: Call light in reach           3/9/2022 0348 by Christopher Mcgee RN  Outcome: Progressing Towards Goal  Note: Fall Risk Interventions:            Medication Interventions: Teach patient to arise slowly    Elimination Interventions: Call light in reach              Problem: Patient Education: Go to Patient Education Activity  Goal: Patient/Family Education  3/9/2022 0818 by Christopher Mcgee RN  Outcome: Progressing Towards Goal  3/9/2022 0348 by Christopher Mcgee RN  Outcome: Progressing Towards Goal     Problem: Pain  Goal: *Control of Pain  3/9/2022 0818 by Christopher Mcgee RN  Outcome: Progressing Towards Goal  3/9/2022 0348 by Christopher Mcgee RN  Outcome: Progressing Towards Goal  Goal: *PALLIATIVE CARE:  Alleviation of Pain  3/9/2022 0818 by Christopher Mcgee RN  Outcome: Progressing Towards Goal  3/9/2022 0348 by Christopher Mcgee RN  Outcome: Progressing Towards Goal     Problem: Patient Education: Go to Patient Education Activity  Goal: Patient/Family Education  3/9/2022 0818 by Christopher Mcgee RN  Outcome: Progressing Towards Goal  3/9/2022 0348 by Christopher Mcgee RN  Outcome: Progressing Towards Goal     Problem: Diabetes Self-Management  Goal: *Disease process and treatment process  Description: Define diabetes and identify own type of diabetes; list 3 options for treating diabetes.   3/9/2022 0818 by Christopher Mcgee RN  Outcome: Progressing Towards Goal  3/9/2022 0348 by Christopher Mcgee RN  Outcome: Progressing Towards Goal  Goal: *Incorporating nutritional management into lifestyle  Description: Describe effect of type, amount and timing of food on blood glucose; list 3 methods for planning meals. 3/9/2022 0818 by Rochelle Galvin RN  Outcome: Progressing Towards Goal  3/9/2022 0348 by Rochelle Galvin RN  Outcome: Progressing Towards Goal  Goal: *Incorporating physical activity into lifestyle  Description: State effect of exercise on blood glucose levels. 3/9/2022 0818 by Rochelle Galvin RN  Outcome: Progressing Towards Goal  3/9/2022 0348 by Rochelle Galvin RN  Outcome: Progressing Towards Goal  Goal: *Developing strategies to promote health/change behavior  Description: Define the ABC's of diabetes; identify appropriate screenings, schedule and personal plan for screenings. 3/9/2022 0818 by Rochelle Galvin RN  Outcome: Progressing Towards Goal  3/9/2022 0348 by Rochelle Galvin RN  Outcome: Progressing Towards Goal  Goal: *Using medications safely  Description: State effect of diabetes medications on diabetes; name diabetes medication taking, action and side effects. 3/9/2022 0818 by Rochelle Galvin RN  Outcome: Progressing Towards Goal  3/9/2022 0348 by Rochelle Galvin RN  Outcome: Progressing Towards Goal  Goal: *Monitoring blood glucose, interpreting and using results  Description: Identify recommended blood glucose targets  and personal targets. 3/9/2022 0818 by Rochelle Galvin RN  Outcome: Progressing Towards Goal  3/9/2022 0348 by Rochelle Galvin RN  Outcome: Progressing Towards Goal  Goal: *Prevention, detection, treatment of acute complications  Description: List symptoms of hyper- and hypoglycemia; describe how to treat low blood sugar and actions for lowering  high blood glucose level. 3/9/2022 0818 by Rochelle Galvin RN  Outcome: Progressing Towards Goal  3/9/2022 0348 by Rochelle Galvin RN  Outcome: Progressing Towards Goal  Goal: *Prevention, detection and treatment of chronic complications  Description: Define the natural course of diabetes and describe the relationship of blood glucose levels to long term complications of diabetes.   3/9/2022 0818 by Cindy Dubs, RN  Outcome: Progressing Towards Goal  3/9/2022 0348 by Cindy Leone RN  Outcome: Progressing Towards Goal  Goal: *Developing strategies to address psychosocial issues  Description: Describe feelings about living with diabetes; identify support needed and support network  3/9/2022 0818 by Cindy Leone RN  Outcome: Progressing Towards Goal  3/9/2022 0348 by Cindy Leone RN  Outcome: Progressing Towards Goal  Goal: *Insulin pump training  3/9/2022 0818 by Cindy Leone RN  Outcome: Progressing Towards Goal  3/9/2022 0348 by Cindy Leone RN  Outcome: Progressing Towards Goal  Goal: *Sick day guidelines  3/9/2022 0818 by Cindy Leone RN  Outcome: Progressing Towards Goal  3/9/2022 0348 by Cindy Leone RN  Outcome: Progressing Towards Goal  Goal: *Patient Specific Goal (EDIT GOAL, INSERT TEXT)  3/9/2022 0818 by Cindy Leone RN  Outcome: Progressing Towards Goal  3/9/2022 0348 by Cindy Leone RN  Outcome: Progressing Towards Goal     Problem: Patient Education: Go to Patient Education Activity  Goal: Patient/Family Education  3/9/2022 0818 by Cindy Leone RN  Outcome: Progressing Towards Goal  3/9/2022 0348 by Cindy Leone RN  Outcome: Progressing Towards Goal     Problem: Urinary Elimination - Impaired  Goal: *Absence/decrease in episodes of  urinary incontinence  3/9/2022 0818 by Cindy Leone RN  Outcome: Progressing Towards Goal  3/9/2022 0348 by Cindy Leone RN  Outcome: Progressing Towards Goal     Problem: Patient Education: Go to Patient Education Activity  Goal: Patient/Family Education  3/9/2022 0818 by Cindy Leone RN  Outcome: Progressing Towards Goal  3/9/2022 0348 by Cindy Leone RN  Outcome: Progressing Towards Goal     Problem: Infection - Risk of, Urinary Catheter-Associated Urinary Tract Infection  Goal: *Absence of infection signs and symptoms  Outcome: Progressing Towards Goal     Problem: Patient Education: Go to Patient Education Activity  Goal: Patient/Family Education  Outcome: Progressing Towards Goal

## 2022-03-09 NOTE — ROUTINE PROCESS
[id number] Spoke with the urologist and he agreed the soto can be removed. He does want the patient bladder  Scanned.

## 2022-03-09 NOTE — PROGRESS NOTES
conducted an initial consultation and Spiritual Assessment for Merissa Jiménez, who is a 73 y.o.,female. Patients Primary Language is: [place]. According to the patients EMR Caodaism Affiliation is: No preference. The reason the Patient came to the hospital is:   Patient Active Problem List    Diagnosis Date Noted    Hyperkalemia 03/07/2022    Acute renal failure (ARF) ([address] 12/18/2021    Hypotension 03/19/2021    Sepsis ([address] 03/18/2021    CARA (acute kidney injury) ([address] 03/18/2021    Nonrheumatic mitral valve regurgitation 08/13/2020    Chronic systolic congestive heart failure ([address] 11/07/2019    Pulmonary edema cardiac cause ([address] 10/19/2019    COPD with acute exacerbation ([address] 10/19/2019    Respiratory failure requiring intubation ([address] 10/19/2019    Syncope 10/18/2019    Flash pulmonary edema ([address] 10/18/2019    Elevated d-dimer 10/18/2019    Acute on chronic respiratory failure with hypoxia ([address] 10/18/2019    CAD (coronary artery disease) 10/18/2019    Ischemic cardiomyopathy 09/09/2019    CAP (community acquired pneumonia) 08/26/2019    Combined systolic and diastolic congestive heart failure ([address] 08/26/2019    Type 2 diabetes mellitus with hyperglycemia ([address] 08/26/2019    TESSA on CPAP 08/26/2019    COPD exacerbation ([address] 08/25/2019    Type 2 diabetes with nephropathy ([address] 07/12/2018    Severe obesity (BMI 35.0-39. 9) with comorbidity ([address] 07/12/2018    Chest pain 06/09/2018    SOB (shortness of breath) 06/09/2018    Coronary artery disease involving native coronary artery with unstable angina pectoris ([address] 06/09/2018    Pulmonary edema 06/08/2018    Coronary artery disease involving native coronary artery of native heart without angina pectoris 05/31/2018    STEMI (ST elevation myocardial infarction) ([address] 05/20/2018    Acute pulmonary edema (HCC) 05/20/2018    Caregiver stress 04/26/2017    Bilateral shoulder pain 09/27/2016    Elevated alkaline phosphatase level 09/27/2016    Chronic obstructive pulmonary disease (HCC) 05/17/2016    Compliance with medication regimen 05/17/2016    Chronic pain of both knees 04/13/2016    Chronic pain syndrome 04/13/2016    Primary osteoarthritis of both knees 04/13/2016    Iron deficiency anemia 10/22/2015    Decreased GFR 10/22/2015    Dyslipidemia, goal LDL below 70 10/22/2015    Former smoker 10/22/2015    Osteoarthritis of both knees     Essential hypertension 09/17/2015    COPD (chronic obstructive pulmonary disease) ([facility] 09/01/2015    Diabetes mellitus type 2, insulin dependent ([facility] 07/02/2015    Environmental allergies 04/01/2015    Cardiomyopathy, dilated ([facility] 12/31/2014        The  provided the following Interventions:  Initiated a relationship of care and support with patient in room 455 today. Listened empathically as patient talked about her being here and how she was already asked about her advance directive being present. Patient was not interested in completing one now. Provided information about Spiritual Care Services. Offered prayer and assurance of continued prayers on patients behalf. The following outcomes were achieved:  Patient shared limited information about her medical narrative and spiritual journey/beliefs. Patient processed feeling about current hospitalization. Patient expressed gratitude for pastoral care visit. Assessment:  Patient does not have any Sabianist/cultural needs that will affect patients preferences in health care. There are no further spiritual or Sabianist issues which require Spiritual Care Services interventions at this time. Plan:  Chaplains will continue to follow and will provide pastoral care on an as needed/requested basis    . [facility]   Spiritual Care   [phone]

## 2022-03-09 NOTE — PROGRESS NOTES
Problem: Falls - Risk of  Goal: *Absence of Falls  Description: Document Sofia Fothergill Fall Risk and appropriate interventions in the flowsheet. Outcome: Progressing Towards Goal  Note: Fall Risk Interventions:            Medication Interventions: Teach patient to arise slowly    Elimination Interventions: Call light in reach              Problem: Patient Education: Go to Patient Education Activity  Goal: Patient/Family Education  Outcome: Progressing Towards Goal     Problem: Pain  Goal: *Control of Pain  Outcome: Progressing Towards Goal  Goal: *PALLIATIVE CARE:  Alleviation of Pain  Outcome: Progressing Towards Goal     Problem: Patient Education: Go to Patient Education Activity  Goal: Patient/Family Education  Outcome: Progressing Towards Goal     Problem: Diabetes Self-Management  Goal: *Disease process and treatment process  Description: Define diabetes and identify own type of diabetes; list 3 options for treating diabetes. Outcome: Progressing Towards Goal  Goal: *Incorporating nutritional management into lifestyle  Description: Describe effect of type, amount and timing of food on blood glucose; list 3 methods for planning meals. Outcome: Progressing Towards Goal  Goal: *Incorporating physical activity into lifestyle  Description: State effect of exercise on blood glucose levels. Outcome: Progressing Towards Goal  Goal: *Developing strategies to promote health/change behavior  Description: Define the ABC's of diabetes; identify appropriate screenings, schedule and personal plan for screenings. Outcome: Progressing Towards Goal  Goal: *Using medications safely  Description: State effect of diabetes medications on diabetes; name diabetes medication taking, action and side effects. Outcome: Progressing Towards Goal  Goal: *Monitoring blood glucose, interpreting and using results  Description: Identify recommended blood glucose targets  and personal targets.   Outcome: Progressing Towards Goal  Goal: *Prevention, detection, treatment of acute complications  Description: List symptoms of hyper- and hypoglycemia; describe how to treat low blood sugar and actions for lowering  high blood glucose level. Outcome: Progressing Towards Goal  Goal: *Prevention, detection and treatment of chronic complications  Description: Define the natural course of diabetes and describe the relationship of blood glucose levels to long term complications of diabetes.   Outcome: Progressing Towards Goal  Goal: *Developing strategies to address psychosocial issues  Description: Describe feelings about living with diabetes; identify support needed and support network  Outcome: Progressing Towards Goal  Goal: *Insulin pump training  Outcome: Progressing Towards Goal  Goal: *Sick day guidelines  Outcome: Progressing Towards Goal  Goal: *Patient Specific Goal (EDIT GOAL, INSERT TEXT)  Outcome: Progressing Towards Goal     Problem: Patient Education: Go to Patient Education Activity  Goal: Patient/Family Education  Outcome: Progressing Towards Goal     Problem: Urinary Elimination - Impaired  Goal: *Absence/decrease in episodes of  urinary incontinence  Outcome: Progressing Towards Goal     Problem: Patient Education: Go to Patient Education Activity  Goal: Patient/Family Education  Outcome: Progressing Towards Goal

## 2022-03-10 LAB
ALBUMIN SERPL-MCNC: 2.7 G/DL (ref 3.4–5)
ANION GAP SERPL CALC-SCNC: 7 MMOL/L (ref 3–18)
BUN SERPL-MCNC: 38 MG/DL (ref 7–18)
BUN/CREAT SERPL: 21 (ref 12–20)
CALCIUM SERPL-MCNC: 8.5 MG/DL (ref 8.5–10.1)
CHLORIDE SERPL-SCNC: 108 MMOL/L (ref 100–111)
CO2 SERPL-SCNC: 22 MMOL/L (ref 21–32)
CREAT SERPL-MCNC: 1.77 MG/DL (ref 0.6–1.3)
GLUCOSE BLD STRIP.AUTO-MCNC: 132 MG/DL (ref 70–110)
GLUCOSE BLD STRIP.AUTO-MCNC: 143 MG/DL (ref 70–110)
GLUCOSE BLD STRIP.AUTO-MCNC: 153 MG/DL (ref 70–110)
GLUCOSE BLD STRIP.AUTO-MCNC: 155 MG/DL (ref 70–110)
GLUCOSE SERPL-MCNC: 120 MG/DL (ref 74–99)
PHOSPHATE SERPL-MCNC: 3.7 MG/DL (ref 2.5–4.9)
POTASSIUM SERPL-SCNC: 4.7 MMOL/L (ref 3.5–5.5)
SODIUM SERPL-SCNC: 137 MMOL/L (ref 136–145)

## 2022-03-10 PROCEDURE — 74011000250 HC RX REV CODE- 250: Performed by: INTERNAL MEDICINE

## 2022-03-10 PROCEDURE — 82962 GLUCOSE BLOOD TEST: CPT

## 2022-03-10 PROCEDURE — G0378 HOSPITAL OBSERVATION PER HR: HCPCS

## 2022-03-10 PROCEDURE — 74011636637 HC RX REV CODE- 636/637: Performed by: STUDENT IN AN ORGANIZED HEALTH CARE EDUCATION/TRAINING PROGRAM

## 2022-03-10 PROCEDURE — 74011250637 HC RX REV CODE- 250/637: Performed by: STUDENT IN AN ORGANIZED HEALTH CARE EDUCATION/TRAINING PROGRAM

## 2022-03-10 PROCEDURE — 94761 N-INVAS EAR/PLS OXIMETRY MLT: CPT

## 2022-03-10 PROCEDURE — 74011250637 HC RX REV CODE- 250/637: Performed by: INTERNAL MEDICINE

## 2022-03-10 PROCEDURE — 74011000250 HC RX REV CODE- 250: Performed by: STUDENT IN AN ORGANIZED HEALTH CARE EDUCATION/TRAINING PROGRAM

## 2022-03-10 PROCEDURE — 65270000029 HC RM PRIVATE

## 2022-03-10 PROCEDURE — 51798 US URINE CAPACITY MEASURE: CPT

## 2022-03-10 PROCEDURE — 74011250637 HC RX REV CODE- 250/637: Performed by: EMERGENCY MEDICINE

## 2022-03-10 PROCEDURE — 94640 AIRWAY INHALATION TREATMENT: CPT

## 2022-03-10 PROCEDURE — 74011250636 HC RX REV CODE- 250/636: Performed by: INTERNAL MEDICINE

## 2022-03-10 PROCEDURE — 80069 RENAL FUNCTION PANEL: CPT

## 2022-03-10 PROCEDURE — 36415 COLL VENOUS BLD VENIPUNCTURE: CPT

## 2022-03-10 RX ADMIN — SODIUM CHLORIDE, PRESERVATIVE FREE 10 ML: 5 INJECTION INTRAVENOUS at 22:19

## 2022-03-10 RX ADMIN — ARFORMOTEROL TARTRATE 15 MCG: 15 SOLUTION RESPIRATORY (INHALATION) at 08:07

## 2022-03-10 RX ADMIN — SODIUM CHLORIDE, PRESERVATIVE FREE 10 ML: 5 INJECTION INTRAVENOUS at 15:19

## 2022-03-10 RX ADMIN — Medication 2 UNITS: at 22:17

## 2022-03-10 RX ADMIN — NYSTATIN: 100000 POWDER TOPICAL at 18:11

## 2022-03-10 RX ADMIN — IPRATROPIUM BROMIDE 0.5 MG: 0.5 SOLUTION RESPIRATORY (INHALATION) at 08:07

## 2022-03-10 RX ADMIN — IPRATROPIUM BROMIDE 0.5 MG: 0.5 SOLUTION RESPIRATORY (INHALATION) at 13:05

## 2022-03-10 RX ADMIN — Medication 2 UNITS: at 12:21

## 2022-03-10 RX ADMIN — WATER 1 G: 1 INJECTION INTRAMUSCULAR; INTRAVENOUS; SUBCUTANEOUS at 15:10

## 2022-03-10 RX ADMIN — Medication 5 MG: at 22:16

## 2022-03-10 RX ADMIN — TAMSULOSIN HYDROCHLORIDE 0.4 MG: 0.4 CAPSULE ORAL at 08:59

## 2022-03-10 RX ADMIN — SODIUM ZIRCONIUM CYCLOSILICATE 10 G: 10 POWDER, FOR SUSPENSION ORAL at 08:59

## 2022-03-10 RX ADMIN — ARFORMOTEROL TARTRATE 15 MCG: 15 SOLUTION RESPIRATORY (INHALATION) at 21:22

## 2022-03-10 RX ADMIN — SODIUM CHLORIDE, PRESERVATIVE FREE 10 ML: 5 INJECTION INTRAVENOUS at 16:55

## 2022-03-10 RX ADMIN — NYSTATIN: 100000 POWDER TOPICAL at 09:06

## 2022-03-10 RX ADMIN — ACETAMINOPHEN 650 MG: 325 TABLET ORAL at 12:21

## 2022-03-10 RX ADMIN — CARVEDILOL 25 MG: 25 TABLET, FILM COATED ORAL at 08:59

## 2022-03-10 RX ADMIN — IPRATROPIUM BROMIDE 0.5 MG: 0.5 SOLUTION RESPIRATORY (INHALATION) at 21:22

## 2022-03-10 NOTE — PROGRESS NOTES
Patient assisted to bathroom several times and voiding 100 - 200 cc of clear yellow urine without pain or discomfort. Bladder scan at 0500 with 0 residual after voiding 200 cc of urine. Bedside and Verbal shift change report given to Rolf Gresham (oncoming nurse) by Avinash Jade RN (offgoing nurse). Report given with SBAR, Kardex, Intake/Output, MAR and Recent Results.

## 2022-03-10 NOTE — PROGRESS NOTES
Problem: Falls - Risk of  Goal: *Absence of Falls  Description: Document Gary Ross Fall Risk and appropriate interventions in the flowsheet. Outcome: Progressing Towards Goal  Note: Fall Risk Interventions:            Medication Interventions: Patient to call before getting OOB,Teach patient to arise slowly    Elimination Interventions: Call light in reach,Patient to call for help with toileting needs              Problem: Patient Education: Go to Patient Education Activity  Goal: Patient/Family Education  Outcome: Progressing Towards Goal     Problem: Pain  Goal: *Control of Pain  Outcome: Progressing Towards Goal  Goal: *PALLIATIVE CARE:  Alleviation of Pain  Outcome: Progressing Towards Goal     Problem: Patient Education: Go to Patient Education Activity  Goal: Patient/Family Education  Outcome: Progressing Towards Goal     Problem: Diabetes Self-Management  Goal: *Disease process and treatment process  Description: Define diabetes and identify own type of diabetes; list 3 options for treating diabetes. Outcome: Progressing Towards Goal  Goal: *Monitoring blood glucose, interpreting and using results  Description: Identify recommended blood glucose targets  and personal targets.   Outcome: Progressing Towards Goal     Problem: Urinary Elimination - Impaired  Goal: *Absence/decrease in episodes of  urinary incontinence  Outcome: Progressing Towards Goal     Problem: Patient Education: Go to Patient Education Activity  Goal: Patient/Family Education  Outcome: Progressing Towards Goal

## 2022-03-10 NOTE — PROGRESS NOTES
Ipratropium scheduled q8h + Brovana (arformoterol) 15 mcg/2 mL 1 neb BID was therapeutically interchanged for Tiotropium/olodaterol (Stiolto) per the P&T Committee approved [facility].

## 2022-03-10 NOTE — PROGRESS NOTES
Bedside and Verbal shift change report given to Yaritza Milian (oncoming nurse) by Marbin Brower (offgoing nurse). Report included the following information SBAR, Kardex, Procedure Summary, MAR and Recent Results.

## 2022-03-10 NOTE — PROGRESS NOTES
Problem: Falls - Risk of  Goal: *Absence of Falls  Description: Document Jennifer Ground Fall Risk and appropriate interventions in the flowsheet. Outcome: Progressing Towards Goal  Note: Fall Risk Interventions:            Medication Interventions: Patient to call before getting OOB,Teach patient to arise slowly    Elimination Interventions: Call light in reach,Patient to call for help with toileting needs              Problem: Patient Education: Go to Patient Education Activity  Goal: Patient/Family Education  Outcome: Progressing Towards Goal     Problem: Pain  Goal: *Control of Pain  Outcome: Progressing Towards Goal  Goal: *PALLIATIVE CARE:  Alleviation of Pain  Outcome: Progressing Towards Goal     Problem: Patient Education: Go to Patient Education Activity  Goal: Patient/Family Education  Outcome: Progressing Towards Goal     Problem: Diabetes Self-Management  Goal: *Disease process and treatment process  Description: Define diabetes and identify own type of diabetes; list 3 options for treating diabetes. Outcome: Progressing Towards Goal  Goal: *Monitoring blood glucose, interpreting and using results  Description: Identify recommended blood glucose targets  and personal targets.   Outcome: Progressing Towards Goal     Problem: Urinary Elimination - Impaired  Goal: *Absence/decrease in episodes of  urinary incontinence  Outcome: Progressing Towards Goal     Problem: Patient Education: Go to Patient Education Activity  Goal: Patient/Family Education  Outcome: Progressing Towards Goal     Problem: Infection - Risk of, Urinary Catheter-Associated Urinary Tract Infection  Goal: *Absence of infection signs and symptoms  Outcome: Progressing Towards Goal     Problem: Patient Education: Go to Patient Education Activity  Goal: Patient/Family Education  Outcome: Progressing Towards Goal

## 2022-03-11 VITALS
RESPIRATION RATE: 18 BRPM | HEART RATE: 67 BPM | DIASTOLIC BLOOD PRESSURE: 80 MMHG | OXYGEN SATURATION: 93 % | TEMPERATURE: 97.8 F | HEIGHT: 64 IN | BODY MASS INDEX: 38.41 KG/M2 | SYSTOLIC BLOOD PRESSURE: 156 MMHG | WEIGHT: 225 LBS

## 2022-03-11 LAB
ALBUMIN SERPL-MCNC: 2.9 G/DL (ref 3.4–5)
ANION GAP SERPL CALC-SCNC: 5 MMOL/L (ref 3–18)
BACTERIA SPEC CULT: ABNORMAL
BUN SERPL-MCNC: 33 MG/DL (ref 7–18)
BUN/CREAT SERPL: 22 (ref 12–20)
CALCIUM SERPL-MCNC: 8.9 MG/DL (ref 8.5–10.1)
CC UR VC: ABNORMAL
CHLORIDE SERPL-SCNC: 111 MMOL/L (ref 100–111)
CO2 SERPL-SCNC: 25 MMOL/L (ref 21–32)
CREAT SERPL-MCNC: 1.5 MG/DL (ref 0.6–1.3)
GLUCOSE BLD STRIP.AUTO-MCNC: 109 MG/DL (ref 70–110)
GLUCOSE BLD STRIP.AUTO-MCNC: 136 MG/DL (ref 70–110)
GLUCOSE SERPL-MCNC: 101 MG/DL (ref 74–99)
PHOSPHATE SERPL-MCNC: 3.3 MG/DL (ref 2.5–4.9)
POTASSIUM SERPL-SCNC: 4.5 MMOL/L (ref 3.5–5.5)
SERVICE CMNT-IMP: ABNORMAL
SODIUM SERPL-SCNC: 141 MMOL/L (ref 136–145)

## 2022-03-11 PROCEDURE — 94640 AIRWAY INHALATION TREATMENT: CPT

## 2022-03-11 PROCEDURE — 74011250637 HC RX REV CODE- 250/637: Performed by: STUDENT IN AN ORGANIZED HEALTH CARE EDUCATION/TRAINING PROGRAM

## 2022-03-11 PROCEDURE — 94761 N-INVAS EAR/PLS OXIMETRY MLT: CPT

## 2022-03-11 PROCEDURE — 74011000250 HC RX REV CODE- 250: Performed by: STUDENT IN AN ORGANIZED HEALTH CARE EDUCATION/TRAINING PROGRAM

## 2022-03-11 PROCEDURE — 36415 COLL VENOUS BLD VENIPUNCTURE: CPT

## 2022-03-11 PROCEDURE — 99239 HOSP IP/OBS DSCHRG MGMT >30: CPT | Performed by: FAMILY MEDICINE

## 2022-03-11 PROCEDURE — 80069 RENAL FUNCTION PANEL: CPT

## 2022-03-11 PROCEDURE — 82962 GLUCOSE BLOOD TEST: CPT

## 2022-03-11 RX ORDER — TAMSULOSIN HYDROCHLORIDE 0.4 MG/1
0.4 CAPSULE ORAL DAILY
Qty: 30 CAPSULE | Refills: 0 | Status: ON HOLD | OUTPATIENT
Start: 2022-03-12 | End: 2022-06-27

## 2022-03-11 RX ORDER — CIPROFLOXACIN 250 MG/1
250 TABLET, FILM COATED ORAL 2 TIMES DAILY
Qty: 12 TABLET | Refills: 0 | Status: SHIPPED | OUTPATIENT
Start: 2022-03-11 | End: 2022-03-17

## 2022-03-11 RX ADMIN — NYSTATIN: 100000 POWDER TOPICAL at 09:12

## 2022-03-11 RX ADMIN — CARVEDILOL 25 MG: 25 TABLET, FILM COATED ORAL at 08:08

## 2022-03-11 RX ADMIN — SODIUM CHLORIDE, PRESERVATIVE FREE 10 ML: 5 INJECTION INTRAVENOUS at 05:35

## 2022-03-11 RX ADMIN — TAMSULOSIN HYDROCHLORIDE 0.4 MG: 0.4 CAPSULE ORAL at 09:07

## 2022-03-11 RX ADMIN — ARFORMOTEROL TARTRATE 15 MCG: 15 SOLUTION RESPIRATORY (INHALATION) at 07:49

## 2022-03-11 RX ADMIN — IPRATROPIUM BROMIDE 0.5 MG: 0.5 SOLUTION RESPIRATORY (INHALATION) at 07:50

## 2022-03-11 NOTE — PROGRESS NOTES
Problem: Falls - Risk of  Goal: *Absence of Falls  Description: Document Frances Paula Fall Risk and appropriate interventions in the flowsheet.   Outcome: Progressing Towards Goal  Note: Fall Risk Interventions:            Medication Interventions: Assess postural VS orthostatic hypotension,Evaluate medications/consider consulting pharmacy,Patient to call before getting OOB,Teach patient to arise slowly    Elimination Interventions: Call light in reach,Elevated toilet seat,Stay With Me (per policy),Toilet paper/wipes in reach,Toileting schedule/hourly rounds

## 2022-03-11 NOTE — PROGRESS NOTES
RENAL DAILY PROGRESS NOTE              Subjective:       Complaint: feels much better  Overnight events noted  no nausea, vomiting, chest pain  C/o ankle pain on left leg    IMPRESSION:   · CARA on Chronic kidney disease stage 3 due to diabetes,hypertension. Bl cr around 1.5. over diuresis? .UTI? With UA showing blood,wbc,bacteria  · Proteinuria due to diabetic nephropathy  · Hx CAD and ischemic cardiomyopathy  · Secondary hyperparathyroidism   PLAN:   ·  on flomax. · [facility] for discharge from my standpoint  · I and O  · Daily weights  · Avoid hypotension  · Avoid phosphate based enemas  · Avoid NSAIDs  · Takes aldactone at home.  Need to stop this completely on discharge  · D/w           Current Facility-Administered Medications   Medication Dose Route Frequency    nystatin (MYCOSTATIN) 100,000 unit/gram powder   Topical BID    ipratropium (ATROVENT) 0.02 % nebulizer solution 0.5 mg  0.5 mg Nebulization TID RT    arformoteroL (BROVANA) neb solution 15 mcg  15 mcg Nebulization BID RT    melatonin tablet 5 mg  5 mg Oral QHS    sodium chloride (NS) flush 5-40 mL  5-40 mL IntraVENous Q8H    sodium chloride (NS) flush 5-40 mL  5-40 mL IntraVENous PRN    acetaminophen (TYLENOL) tablet 650 mg  650 mg Oral Q6H PRN    Or    acetaminophen (TYLENOL) suppository 650 mg  650 mg Rectal Q6H PRN    polyethylene glycol (MIRALAX) packet 17 g  17 g Oral DAILY PRN    ondansetron (ZOFRAN ODT) tablet 4 mg  4 mg Oral Q8H PRN    Or    ondansetron (ZOFRAN) injection 4 mg  4 mg IntraVENous Q6H PRN    enoxaparin (LOVENOX) injection 40 mg  40 mg SubCUTAneous DAILY    cefTRIAXone (ROCEPHIN) 1 g in sterile water (preservative free) 10 mL IV syringe  1 g IntraVENous Q24H    tamsulosin (FLOMAX) capsule 0.4 mg  0.4 mg Oral DAILY    insulin lispro (HUMALOG) injection   SubCUTAneous AC&HS    glucose chewable tablet 16 g  4 Tablet Oral PRN    glucagon (GLUCAGEN) injection 1 mg  1 mg IntraMUSCular PRN    dextrose 10% infusion 0-250 mL  0-250 mL IntraVENous PRN    carvediloL (COREG) tablet 25 mg  25 mg Oral BID WITH MEALS    sodium chloride (NS) flush 5-40 mL  5-40 mL IntraVENous Q8H    sodium chloride (NS) flush 5-40 mL  5-40 mL IntraVENous PRN       Review of Symptoms: comprehensive ROS negative except above. Objective:     Patient Vitals for the past 24 hrs:   Temp Pulse Resp BP SpO2   03/11/22 0813 97.8 °F (36.6 °C) 67 18 (!) 156/80 93 %   03/11/22 0753 -- -- -- -- 100 %   03/11/22 0401 98.4 °F (36.9 °C) 74 18 115/63 95 %   03/11/22 0048 98.1 °F (36.7 °C) 75 18 125/88 95 %   03/10/22 2037 98.8 °F (37.1 °C) 76 18 (!) 154/66 93 %   03/10/22 1519 98.2 °F (36.8 °C) 77 18 97/62 97 %        Weight change:      03/09 1901 - 03/11 0700  In: 2212.5 [P.O.:1660; I.V.:552.5]  Out: 2650 [Urine:2650]    Intake/Output Summary (Last 24 hours) at 3/11/2022 1152  Last data filed at 3/11/2022 1104  Gross per 24 hour   Intake 970 ml   Output 2150 ml   Net -1180 ml     Physical Exam:     HEENT sclera anicteric, supple neck, no thyromegaly  CVS: S1S2 heard,  no rub  RS: + air entry b/l,   Abd: Soft, Non tender, Not distended, Positive bowel sounds, no organomegaly, no CVA / supra pubic tenderness  Neuro: non focal, awake, alert , CN II-XII are grossly intact  Extrm: no edema, no cyanosis, clubbing   Skin: no visible  Rash  Musculoskeletal: No gross joints or bone deformities         Data Review:     LABS:   Hematology:   No results for input(s): WBC, HGB, HCT, HGBEXT, HCTEXT, HGBEXT, HCTEXT in the last 72 hours.   Chemistry:   Recent Labs     03/11/22  0227 03/10/22  0223 03/09/22  0227 03/08/22  1555 03/08/22  1353   BUN 33* 38* 40* 37* 38*   CREA 1.50* 1.77* 1.93* 1.96* 1.91*   CA 8.9 8.5 9.1  9.2 9.4 9.5   ALB 2.9* 2.7* 2.7*  --  3.0*   K 4.5 4.7 5.2 5.6* 5.7*    137 137 138 138    108 113* 113* 115*   CO2 25 22 18* 19* 15*   PHOS 3.3 3.7 4.6  --   --    * 120* 117* 119* 180*            Procedures/imaging: see electronic medical records for all procedures, Xrays and details which were not copied into this note but were reviewed prior to creation of Plan          Assessment & Plan:     See above        Carmen Holm MD  3/11/2022

## 2022-03-11 NOTE — ROUTINE PROCESS
Bedside shift change report given to Otto Acosta (oncoming nurse) by Phuong Lazar (offgoing nurse). Report included the following information SBAR and Kardex.

## 2022-03-11 NOTE — ROUTINE PROCESS
Bedside shift report received from [facility] with sbar  Resting quietly in bed  Refuses alysia Pickett in   Discharge instructions given  Awaiting family

## 2022-03-11 NOTE — DISCHARGE INSTRUCTIONS
DISCHARGE SUMMARY from Nurse    PATIENT INSTRUCTIONS:    After general anesthesia or intravenous sedation, for 24 hours or while taking prescription Narcotics:  · Limit your activities  · Do not drive and operate hazardous machinery  Patient {ARMBANDS:24053}  ·   · Do not make important personal or business decisions  · Do  not drink alcoholic beverages  · If you have not urinated within 8 hours after discharge, please contact your surgeon on call. Report the following to your surgeon:  · Excessive pain, swelling, redness or odor of or around the surgical area  · Temperature over 100.5  · Nausea and vomiting lasting longer than 4 hours or if unable to take medications  · Any signs of decreased circulation or nerve impairment to extremity: change in color, persistent  numbness, tingling, coldness or increase pain  · Any questions    What to do at Home:  Recommended activity: {discharge activity:53951}, as tolerated    If you experience any of the following symptoms notify doctor of elevated temperature    *  Please give a list of your current medications to your Primary Care Provider. *  Please update this list whenever your medications are discontinued, doses are      changed, or new medications (including over-the-counter products) are added. *  Please carry medication information at all times in case of emergency situations. These are general instructions for a healthy lifestyle:    No smoking/ No tobacco products/ Avoid exposure to second hand smoke  Surgeon General's Warning:  Quitting smoking now greatly reduces serious risk to your health.     Obesity, smoking, and sedentary lifestyle greatly increases your risk for illness    A healthy diet, regular physical exercise & weight monitoring are important for maintaining a healthy lifestyle    You may be retaining fluid if you have a history of heart failure or if you experience any of the following symptoms:  Weight gain of 3 pounds or more overnight or 5 pounds in a week, increased swelling in our hands or feet or shortness of breath while lying flat in bed. Please call your doctor as soon as you notice any of these symptoms; do not wait until your next office visit. The discharge information has been reviewed with the {PATIENT PARENT GUARDIAN:85583}. The {PATIENT PARENT GUARDIAN:37185} verbalized understanding. Discharge medications reviewed with the {Dishcarge meds reviewed [facility]:23626} and appropriate educational materials and side effects teaching were provided. ___________________________________________________________________________________________________________________________________  DISCHARGE SUMMARY from Nurse    PATIENT INSTRUCTIONS:    After general anesthesia or intravenous sedation, for 24 hours or while taking prescription Narcotics:  · Limit your activities  · Do not drive and operate hazardous machinery  · Do not make important personal or business decisions  · Do  not drink alcoholic beverages  · If you have not urinated within 8 hours after discharge, please contact your surgeon on call. Report the following to your surgeon:  · Excessive pain, swelling, redness or odor of or around the surgical area  · Temperature over 100.5  · Nausea and vomiting lasting longer than 4 hours or if unable to take medications  · Any signs of decreased circulation or nerve impairment to extremity: change in color, persistent  numbness, tingling, coldness or increase pain  · Any questions    What to do at Home:  Recommended activity: Activity as tolerated    If you experience any of the following symptoms chest pain, shortness of breath, nausea/vomiting, fever, or pain unrelieved by medication, please follow up with Dr. Leann Hannah. *  Please give a list of your current medications to your Primary Care Provider.     *  Please update this list whenever your medications are discontinued, doses are      changed, or new medications (including over-the-counter products) are added. *  Please carry medication information at all times in case of emergency situations. These are general instructions for a healthy lifestyle:    No smoking/ No tobacco products/ Avoid exposure to second hand smoke  Surgeon General's Warning:  Quitting smoking now greatly reduces serious risk to your health. Obesity, smoking, and sedentary lifestyle greatly increases your risk for illness    A healthy diet, regular physical exercise & weight monitoring are important for maintaining a healthy lifestyle    You may be retaining fluid if you have a history of heart failure or if you experience any of the following symptoms:  Weight gain of 3 pounds or more overnight or 5 pounds in a week, increased swelling in our hands or feet or shortness of breath while lying flat in bed. Please call your doctor as soon as you notice any of these symptoms; do not wait until your next office visit. Patient armband removed and shredded  "[employer]    Thank you for requesting access to [facility]. Please follow the instructions below to securely access and download your online medical record. [facility] allows you to send messages to your doctor, view your test results, renew your prescriptions, schedule appointments, and more. How Do I Sign Up? 1. In your internet browser, go to [email or web address]  2. Click on the First Time User? Click Here link in the Sign In box. You will be redirect to the New Member Sign Up page. 3. Enter your [facility] Access Code exactly as it appears below. You will not need to use this code after youve completed the sign-up process. If you do not sign up before the expiration date, you must request a new code. [facility] Access Code: Activation code not generated  Current [facility] Status: Active (This is the date your [facility] access code will )    4.  Enter the last four digits of your Social Security Number (xxxx) and Date of Birth (mm/dd/yyyy) as indicated and click Submit. You will be taken to the next sign-up page. 5. Create a Healthcare ITt ID. This will be your [employer] login ID and cannot be changed, so think of one that is secure and easy to remember. 6. Create a [employer] password. You can change your password at any time. 7. Enter your Password Reset Question and Answer. This can be used at a later time if you forget your password. 8. Enter your e-mail address. You will receive e-mail notification when new information is available in [address] 9. Click Sign Up. You can now view and download portions of your medical record. 10. Click the Download Summary menu link to download a portable copy of your medical information. Additional Information    If you have questions, please visit the Frequently Asked Questions section of the [employer] website at [email or web address] Remember, [employer] is NOT to be used for urgent needs. For medical emergencies, dial 911. The discharge information has been reviewed with the patient. The patient verbalized understanding. Discharge medications reviewed with the patient and appropriate educational materials and side effects teaching were provided.   ___________________________________________________________________________________________________________________________________

## 2022-03-11 NOTE — PROGRESS NOTES
Problem: Falls - Risk of  Goal: *Absence of Falls  Description: Document Lebron Huggins Fall Risk and appropriate interventions in the flowsheet. Outcome: Progressing Towards Goal  Note: Fall Risk Interventions:            Medication Interventions: Assess postural VS orthostatic hypotension,Evaluate medications/consider consulting pharmacy,Patient to call before getting OOB,Teach patient to arise slowly    Elimination Interventions: Call light in reach,Elevated toilet seat,Stay With Me (per policy),Toilet paper/wipes in reach,Toileting schedule/hourly rounds              Problem: Patient Education: Go to Patient Education Activity  Goal: Patient/Family Education  Outcome: Progressing Towards Goal     Problem: Pain  Goal: *Control of Pain  Outcome: Progressing Towards Goal  Goal: *PALLIATIVE CARE:  Alleviation of Pain  Outcome: Progressing Towards Goal     Problem: Patient Education: Go to Patient Education Activity  Goal: Patient/Family Education  Outcome: Progressing Towards Goal     Problem: Diabetes Self-Management  Goal: *Disease process and treatment process  Description: Define diabetes and identify own type of diabetes; list 3 options for treating diabetes. Outcome: Progressing Towards Goal  Goal: *Incorporating nutritional management into lifestyle  Description: Describe effect of type, amount and timing of food on blood glucose; list 3 methods for planning meals. Outcome: Progressing Towards Goal  Goal: *Incorporating physical activity into lifestyle  Description: State effect of exercise on blood glucose levels. Outcome: Progressing Towards Goal  Goal: *Developing strategies to promote health/change behavior  Description: Define the ABC's of diabetes; identify appropriate screenings, schedule and personal plan for screenings.   Outcome: Progressing Towards Goal  Goal: *Using medications safely  Description: State effect of diabetes medications on diabetes; name diabetes medication taking, action and side effects. Outcome: Progressing Towards Goal  Goal: *Monitoring blood glucose, interpreting and using results  Description: Identify recommended blood glucose targets  and personal targets. Outcome: Progressing Towards Goal  Goal: *Prevention, detection, treatment of acute complications  Description: List symptoms of hyper- and hypoglycemia; describe how to treat low blood sugar and actions for lowering  high blood glucose level. Outcome: Progressing Towards Goal  Goal: *Prevention, detection and treatment of chronic complications  Description: Define the natural course of diabetes and describe the relationship of blood glucose levels to long term complications of diabetes.   Outcome: Progressing Towards Goal  Goal: *Developing strategies to address psychosocial issues  Description: Describe feelings about living with diabetes; identify support needed and support network  Outcome: Progressing Towards Goal  Goal: *Insulin pump training  Outcome: Progressing Towards Goal  Goal: *Sick day guidelines  Outcome: Progressing Towards Goal  Goal: *Patient Specific Goal (EDIT GOAL, INSERT TEXT)  Outcome: Progressing Towards Goal     Problem: Patient Education: Go to Patient Education Activity  Goal: Patient/Family Education  Outcome: Progressing Towards Goal     Problem: Urinary Elimination - Impaired  Goal: *Absence/decrease in episodes of  urinary incontinence  Outcome: Progressing Towards Goal     Problem: Patient Education: Go to Patient Education Activity  Goal: Patient/Family Education  Outcome: Progressing Towards Goal     Problem: Infection - Risk of, Urinary Catheter-Associated Urinary Tract Infection  Goal: *Absence of infection signs and symptoms  Outcome: Progressing Towards Goal     Problem: Patient Education: Go to Patient Education Activity  Goal: Patient/Family Education  Outcome: Progressing Towards Goal     Problem: Patient Education: Go to Patient Education Activity  Goal: Patient/Family Education  Outcome: Progressing Towards Goal     Problem: Patient Education: Go to Patient Education Activity  Goal: Patient/Family Education  Outcome: Progressing Towards Goal

## 2022-03-11 NOTE — PROGRESS NOTES
Discharge order noted for today. Orders reviewed. MD ordered home health for PT/OT eval for safety but patient is declining home services. Notified MD. Patients  will be picking her up. No needs identified at this time.  remains available if needed.   Morgan Macias RN - Outcomes Manager  [phone]

## 2022-03-18 PROBLEM — J96.21 ACUTE ON CHRONIC RESPIRATORY FAILURE WITH HYPOXIA (HCC): Status: ACTIVE | Noted: 2019-10-18

## 2022-03-18 PROBLEM — R07.9 CHEST PAIN: Status: ACTIVE | Noted: 2018-06-09

## 2022-03-18 PROBLEM — I25.110 CORONARY ARTERY DISEASE INVOLVING NATIVE CORONARY ARTERY WITH UNSTABLE ANGINA PECTORIS (HCC): Status: ACTIVE | Noted: 2018-06-09

## 2022-03-18 PROBLEM — J81.0 ACUTE PULMONARY EDEMA (HCC): Status: ACTIVE | Noted: 2018-05-20

## 2022-03-18 PROBLEM — I95.9 HYPOTENSION: Status: ACTIVE | Noted: 2021-03-19

## 2022-03-18 PROBLEM — G47.33 OSA ON CPAP: Status: ACTIVE | Noted: 2019-08-26

## 2022-03-18 PROBLEM — N17.9 AKI (ACUTE KIDNEY INJURY) (HCC): Status: ACTIVE | Noted: 2021-03-18

## 2022-03-18 PROBLEM — I25.10 CORONARY ARTERY DISEASE INVOLVING NATIVE CORONARY ARTERY OF NATIVE HEART WITHOUT ANGINA PECTORIS: Status: ACTIVE | Noted: 2018-05-31

## 2022-03-18 PROBLEM — Z99.89 OSA ON CPAP: Status: ACTIVE | Noted: 2019-08-26

## 2022-03-19 PROBLEM — R06.02 SOB (SHORTNESS OF BREATH): Status: ACTIVE | Noted: 2018-06-09

## 2022-03-19 PROBLEM — R55 SYNCOPE: Status: ACTIVE | Noted: 2019-10-18

## 2022-03-19 PROBLEM — J81.0 FLASH PULMONARY EDEMA (HCC): Status: ACTIVE | Noted: 2019-10-18

## 2022-03-19 PROBLEM — I50.22 CHRONIC SYSTOLIC CONGESTIVE HEART FAILURE (HCC): Status: ACTIVE | Noted: 2019-11-07

## 2022-03-19 PROBLEM — R79.89 ELEVATED D-DIMER: Status: ACTIVE | Noted: 2019-10-18

## 2022-03-19 PROBLEM — E11.21 TYPE 2 DIABETES WITH NEPHROPATHY (HCC): Status: ACTIVE | Noted: 2018-07-12

## 2022-03-19 PROBLEM — Z63.6 CAREGIVER STRESS: Status: ACTIVE | Noted: 2017-04-26

## 2022-03-19 PROBLEM — E87.5 HYPERKALEMIA: Status: ACTIVE | Noted: 2022-03-07

## 2022-03-19 PROBLEM — I25.10 CAD (CORONARY ARTERY DISEASE): Status: ACTIVE | Noted: 2019-10-18

## 2022-03-19 PROBLEM — I34.0 NONRHEUMATIC MITRAL VALVE REGURGITATION: Status: ACTIVE | Noted: 2020-08-13

## 2022-03-19 PROBLEM — E11.65 TYPE 2 DIABETES MELLITUS WITH HYPERGLYCEMIA (HCC): Status: ACTIVE | Noted: 2019-08-26

## 2022-03-19 PROBLEM — J96.90 RESPIRATORY FAILURE REQUIRING INTUBATION (HCC): Status: ACTIVE | Noted: 2019-10-19

## 2022-03-19 PROBLEM — J44.1 COPD EXACERBATION (HCC): Status: ACTIVE | Noted: 2019-08-25

## 2022-03-19 PROBLEM — J81.1 PULMONARY EDEMA: Status: ACTIVE | Noted: 2018-06-08

## 2022-03-19 PROBLEM — N17.9 ACUTE RENAL FAILURE (ARF) (HCC): Status: ACTIVE | Noted: 2021-12-18

## 2022-03-19 PROBLEM — J44.1 COPD WITH ACUTE EXACERBATION (HCC): Status: ACTIVE | Noted: 2019-10-19

## 2022-03-19 PROBLEM — I25.5 ISCHEMIC CARDIOMYOPATHY: Status: ACTIVE | Noted: 2019-09-09

## 2022-03-19 PROBLEM — E66.01 SEVERE OBESITY (BMI 35.0-39.9) WITH COMORBIDITY (HCC): Status: ACTIVE | Noted: 2018-07-12

## 2022-03-19 PROBLEM — I50.1 PULMONARY EDEMA CARDIAC CAUSE (HCC): Status: ACTIVE | Noted: 2019-10-19

## 2022-03-19 PROBLEM — A41.9 SEPSIS (HCC): Status: ACTIVE | Noted: 2021-03-18

## 2022-03-19 PROBLEM — J18.9 CAP (COMMUNITY ACQUIRED PNEUMONIA): Status: ACTIVE | Noted: 2019-08-26

## 2022-03-20 PROBLEM — I50.40 COMBINED SYSTOLIC AND DIASTOLIC CONGESTIVE HEART FAILURE (HCC): Status: ACTIVE | Noted: 2019-08-26

## 2022-03-20 PROBLEM — I21.3 STEMI (ST ELEVATION MYOCARDIAL INFARCTION) (HCC): Status: ACTIVE | Noted: 2018-05-20

## 2022-04-04 NOTE — DISCHARGE SUMMARY
Discharge Summary    Patient: Zulema Smith MRN: [id number]  CSN: [id number]    YOB: 1950  Age: 73 y.o. Sex: female    DOA: 3/7/2022 LOS:  LOS: 4 days   Discharge Date: 3/11/2022     Admission Diagnoses: Hyperkalemia [E87.5]    Discharge Diagnoses:    E. coli UTI  CARA on CKD 3  Acute urinary retention  DM2 with hyperglycemia  COPD without acute exacerbation  Chronic combined systolic/diastolic CHF  Gout  Severe obesity  Hyperkalemia, resolved    Discharge Condition: Stable    PHYSICAL EXAM  Visit Vitals  BP (!) 156/80 (BP 1 Location: Right upper arm, BP Patient Position: Sitting)   Pulse 67   Temp 97.8 °F (36.6 °C)   Resp 18   Ht 5' 4\" (1.626 m)   Wt 102.1 kg (225 lb)   SpO2 93%   BMI 38.62 kg/m²       General: In NAD. Nontoxic-appearing. HEENT: NCAT. Sclerae anicteric. Lungs:  Clear, no wheezes. No accessory muscle use. Heart:  RRR. Abdomen: Soft, NT/ND. Extremities: Warm, no edema or ischemia. Psych:   Mood normal.  Neurologic:  Awake and alert, moves extremities spontaneously. Motor grossly nonfocal.    Hospital Course:   See admission H&P for full details of HPI. Patient was admitted to the hospital after presenting to [facility] emergency department for evaluation of suprapubic pain dysuria and urinary retention. Urinalysis was concerning for UTI and there was no evidence for pyelonephritis on CT scan. Patient was started on empiric antibiotic therapy. Urine cultures ultimately grew E. coli which was essentially pansensitive. IV antibiotic therapy has been transitioned to oral ciprofloxacin to complete course of therapy at discharge. Patient has not had any evidence of worsening infection. Nephrology evaluation was obtained due to CARA. Frye catheter was placed for retention issues but this was able to be discontinued. She has been started on Flomax and has been recommended to stop taking Aldactone at home due to retention issues.     Consults:   Nephrology    Significant Diagnostic Studies/Procedures:  CT abdomen/pelvis:  IMPRESSION     No acute inflammation in abdomen or pelvis.     Incidentals as above. CXR:  IMPRESSION  1. No acute infiltrate or effusion. Discharge Medications:     Discharge Medication List as of 3/11/2022  1:41 PM      START taking these medications    Details   tamsulosin (FLOMAX) 0.4 mg capsule Take 1 Capsule by mouth daily. , Normal, Disp-30 Capsule, R-0      ciprofloxacin HCl (Cipro) 250 mg tablet Take 1 Tablet by mouth two (2) times a day for 6 days. , Normal, Disp-12 Tablet, R-0         CONTINUE these medications which have NOT CHANGED    Details   albuterol-ipratropium (DUO-NEB) 2.5 mg-0.5 mg/3 ml nebu 3 mL by Nebulization route every six (6) hours as needed for Wheezing., Normal, Disp-30 Nebule, R-0      atorvastatin (LIPITOR) 80 mg tablet Take 1 Tab by mouth daily. , Normal, Disp-90 Tab, R-3      carvediloL (COREG) 25 mg tablet Take 1 Tab by mouth two (2) times daily (with meals). , Normal, Disp-180 Tab, R-3      albuterol (PROVENTIL HFA, VENTOLIN HFA, PROAIR HFA) 90 mcg/actuation inhaler Take 2 Puffs by inhalation every four (4) hours as needed for Wheezing., Print, Disp-1 Inhaler, R-0      aspirin 81 mg chewable tablet Take 2 Tabs by mouth daily. , Normal, Disp-60 Tab, R-0      insulin detemir (LEVEMIR) 100 unit/mL injection 15 units daily for diabetes, Normal, Disp-2 Vial, R-0      umeclidinium-vilanterol (ANORO ELLIPTA) 62.5-25 mcg/actuation inhaler Take 1 Puff by inhalation daily. For COPD, Program9/22/15 Dr Yung Phelps prescribed. Will order via [facility] if availableDisp-3 Inhaler, R-3      bumetanide (BUMEX) 2 mg tablet Take 0.5 Tablets by mouth daily. , Normal, Disp-30 Tablet, R-2      calcitRIOL (ROCALTROL) 0.25 mcg capsule Take 1 Cap by mouth daily. , Normal, Disp-30 Cap, R-0      cyanocobalamin 1,000 mcg tablet Take 1 Tab by mouth daily. , Normal, Disp-30 Tab, R-0      desloratadine (CLARINEX) 5 mg tablet Take 1 Tab by mouth daily as needed for Allergies. , Program, Disp-30 Tab, R-0      mometasone (NASONEX) 50 mcg/actuation nasal spray 2 Sprays by Both Nostrils route daily as needed. For allergies, Program, Disp-3 Container, R-3         STOP taking these medications       magnesium oxide (MAG-OX) 400 mg tablet Comments:   Reason for Stopping:         hydrOXYzine HCL (ATARAX) 25 mg tablet Comments:   Reason for Stopping:         tiZANidine (ZANAFLEX) 4 mg tablet Comments:   Reason for Stopping:                   Activity: As tolerated. Diet: Cardiac Diet and Diabetic Diet    Disposition: Home. Patient was recommended for home health care but patient declined the services. Follow-up: with PCP in 1 week. Minutes spent on discharge: >30 minutes spent coordinating this discharge. Qamar Quintanilla MD  [address] [place], [place] [address]ists    Disclaimer: Sections of this note are dictated using utilizing voice recognition software. Minor typographical errors may be present. If questions arise, please do not hesitate to contact me or call our department.

## 2022-06-08 ENCOUNTER — OFFICE VISIT (OUTPATIENT)
Dept: ORTHOPEDIC SURGERY | Age: 72
End: 2022-06-08
Payer: MEDICARE

## 2022-06-08 VITALS
WEIGHT: 220.6 LBS | HEART RATE: 80 BPM | BODY MASS INDEX: 37.66 KG/M2 | OXYGEN SATURATION: 90 % | HEIGHT: 64 IN | TEMPERATURE: 97.3 F

## 2022-06-08 DIAGNOSIS — G89.29 CHRONIC PAIN OF LEFT KNEE: ICD-10-CM

## 2022-06-08 DIAGNOSIS — M25.462 EFFUSION OF KNEE JOINT, LEFT: ICD-10-CM

## 2022-06-08 DIAGNOSIS — M25.562 CHRONIC PAIN OF LEFT KNEE: ICD-10-CM

## 2022-06-08 DIAGNOSIS — E66.01 SEVERE OBESITY (BMI 35.0-39.9) WITH COMORBIDITY (HCC): ICD-10-CM

## 2022-06-08 DIAGNOSIS — M17.0 PRIMARY OSTEOARTHRITIS OF BOTH KNEES: ICD-10-CM

## 2022-06-08 DIAGNOSIS — M17.12 PRIMARY OSTEOARTHRITIS OF LEFT KNEE: Primary | ICD-10-CM

## 2022-06-08 PROCEDURE — 20611 DRAIN/INJ JOINT/BURSA W/US: CPT | Performed by: PHYSICIAN ASSISTANT

## 2022-06-08 PROCEDURE — 73562 X-RAY EXAM OF KNEE 3: CPT | Performed by: PHYSICIAN ASSISTANT

## 2022-06-08 PROCEDURE — 96372 THER/PROPH/DIAG INJ SC/IM: CPT | Performed by: PHYSICIAN ASSISTANT

## 2022-06-08 RX ORDER — TRIAMCINOLONE ACETONIDE 40 MG/ML
40 INJECTION, SUSPENSION INTRA-ARTICULAR; INTRAMUSCULAR ONCE
Status: COMPLETED | OUTPATIENT
Start: 2022-06-08 | End: 2022-06-08

## 2022-06-08 RX ADMIN — TRIAMCINOLONE ACETONIDE 40 MG: 40 INJECTION, SUSPENSION INTRA-ARTICULAR; INTRAMUSCULAR at 14:09

## 2022-06-08 NOTE — PROGRESS NOTES
Patient: Zoraida Colbert                MRN: [id number]       SSN: [id number]  YOB: 1950        AGE: 73 y.o. SEX: female          PCP: Shelley Harding MD  06/08/22    Chief Complaint   Patient presents with    Knee Pain     [facility]       HISTORY:  Zoraida Colbert is a 73 y.o. female presents to the office with complaint of acute on chronic bilateral knee pain left greater than right. She has been seen under care of Dr. Parisa Lipscomb in the past and received cortisone injections to her knees which did help her symptoms. She has no trauma reported. She has tricompartmental osteoarthritis by report in both knees. The arthritic pain limits her ability to only stand for short periods of time and walk short distances. Tylenol and Motrin have been generally unsuccessful for symptom management. She has not tried Voltaren gel.       Pain Assessment  6/8/2022   Location of Pain Knee   Location Modifiers Right;Left   Severity of Pain 10   Quality of Pain Sharp   Quality of Pain Comment -   Duration of Pain Persistent   Frequency of Pain Constant   Aggravating Factors Bending;Walking;Standing   Aggravating Factors Comment -   Limiting Behavior Yes   Relieving Factors Nothing   Relieving Factors Comment just the cortisone shots help with the pain for a couple of months   Result of Injury No   Work-Related Injury -   Type of Injury -   Type of Injury Comment -           Lab Results   Component Value Date/Time    Hemoglobin A1c 6.9 (H) 12/16/2021 09:55 PM     Weight Metrics 6/8/2022 3/10/2022 12/19/2021 4/26/2021 4/8/2021 4/2/2021 4/1/2021   Weight 220 lb 9.6 oz 225 lb 210 lb 1.6 oz 231 lb 12.8 oz 223 lb 229 lb 229 lb   BMI 37.87 kg/m2 38.62 kg/m2 36.06 kg/m2 39.79 kg/m2 38.28 kg/m2 39.31 kg/m2 39.31 kg/m2            Problem List Items Addressed This Visit        Orthopedic Problems    Osteoarthritis of both knees       Other    Severe obesity (BMI 35.0-39. 9) with comorbidity ([facility]      Other Visit Diagnoses     Primary osteoarthritis of left knee    -  Primary    Relevant Orders    AMB POC X-RAY KNEE 3 VIEW    Chronic pain of left knee        Effusion of knee joint, left              PAST MEDICAL HISTORY:   Past Medical History:   Diagnosis Date    Abnormal WBC count 5/17/2016    Anemia     Bilateral shoulder pain 9/27/2016    Chondromalacia of both patellae     COPD (chronic obstructive pulmonary disease) ([facility] 9/2015    mild-mod dz; Dr Estevan Johnson    Diabetes [facility]) 2013    Diabetic eye exam ([facility] 2016    Dilated cardiomyopathy ([facility]     Dyslipidemia     Gout     Heart attack ([facility]     Heart attack ([facility]     [facility] 10/18/2019    History of echocardiogram 11/14/2014    Mild LVE. EF 40%. Mild, diffuse hypk. Mild LAE. Mild MR.      Hypercholesteremia 1/08/14    Hypertension 2000    Noncompliance with medications 2/16/2016    Obesity     Orthostatic hypotension 5/17/2016    Osteoarthritis of both knees     Pain management 04/01/2016    Dr. Alford Odor Popliteal cyst, bilateral      Vitamin D deficiency 10/16/14       PAST SURGICAL HISTORY:   Past Surgical History:   Procedure Laterality Date    HX HEART CATHETERIZATION      HX TUBAL LIGATION         ALLERGIES: No Known Allergies     CURRENT MEDICATIONS:  A list of medications prior to the time of admission include:  Prior to Admission medications    Medication Sig Start Date End Date Taking? Authorizing Provider   tamsulosin (FLOMAX) 0.4 mg capsule Take 1 Capsule by mouth daily. 3/12/22   Ana Smith MD   bumetanide (BUMEX) 2 mg tablet Take 0.5 Tablets by mouth daily. Patient not taking: Reported on 3/8/2022 12/20/21   Haroon Sparks DO   calcitRIOL (ROCALTROL) 0.25 mcg capsule Take 1 Cap by mouth daily. Patient not taking: Reported on 3/8/2022 3/24/21   Ray Delaney MD   cyanocobalamin 1,000 mcg tablet Take 1 Tab by mouth daily.   Patient not taking: Reported on 3/8/2022 3/24/21   Tere Meade MD   albuterol-ipratropium (DUO-NEB) 2.5 mg-0.5 mg/3 ml nebu 3 mL by Nebulization route every six (6) hours as needed for Wheezing. 3/23/21   Tere Meade MD   atorvastatin (LIPITOR) 80 mg tablet Take 1 Tab by mouth daily. 21   Rossy Gomes MD   carvediloL (COREG) 25 mg tablet Take 1 Tab by mouth two (2) times daily (with meals). 21   Rossy Gomes MD   albuterol (PROVENTIL HFA, VENTOLIN HFA, PROAIR HFA) 90 mcg/actuation inhaler Take 2 Puffs by inhalation every four (4) hours as needed for Wheezing. 19   Qi Nguyen MD   aspirin 81 mg chewable tablet Take 2 Tabs by mouth daily. 19   Kendra Rabago NP   insulin detemir (LEVEMIR) 100 unit/mL injection 15 units daily for diabetes 17   Iqra Irizarry DNP   desloratadine (CLARINEX) 5 mg tablet Take 1 Tab by mouth daily as needed for Allergies. 17   Marci Irizarry DNP   mometasone (NASONEX) 50 mcg/actuation nasal spray 2 Sprays by Both Nostrils route daily as needed. For allergies  Patient not taking: Reported on 3/8/2022 4/26/17   Kinza Markham DNP   umeclidinium-vilanterol [facility] ELLIPTA) 62.5-25 mcg/actuation inhaler Take 1 Puff by inhalation daily. For COPD 17   Marci Irizarry DNP       FAMILY HISTORY:   Family History   Problem Relation Age of Onset    Diabetes Mother     Hypertension Mother     Hypertension Father     Kidney Disease Maternal Aunt 48        Dialysis       SOCIAL HISTORY:   Social History     Socioeconomic History    Marital status:     Number of children: 3   Occupational History    Occupation: retired   Tobacco Use    Smoking status: Former Smoker     Packs/day: 0.25     Years: 48.00     Pack years: 12.00     Types: Cigarettes     Quit date: 2018     Years since quittin.0    Smokeless tobacco: Never Used   Substance and Sexual Activity    Alcohol use:  No Alcohol/week: 0.0 standard drinks    Drug use: No    Sexual activity: Yes     Partners: Male   Other Topics Concern     Service No    Blood Transfusions No    Caffeine Concern No    Occupational Exposure No    Hobby Hazards No    Sleep Concern No    Stress Concern No    Weight Concern No    Special Diet No    Back Care No    Exercise No    Bike Helmet No    Seat Belt Yes    Self-Exams Yes       ROS:No CP, No SOB, No fever/chills nor night sweats. No headaches, vision abnormalities to include double and or loss of vision. No dizziness. No hearing abnormalities. No Chest Pain nor Shortness of breath. Pt denies h/o spinal surgery, injections, or PT/chiropractor. Patient has attempted self treatment with less than adequate relief on oral and topical analgesic / anti inflammatory medications . Pt denies change in bowel or bladder habits. No saddle paresthesia / anesthesia. Pt denies fever, unplanned weight loss / weight gains, and no skin changes. Musculoskeletal pain per HPI. Pain is exacerbated positionally. PHYSICAL EXAM:    Visit Vitals  Pulse 80   Temp 97.3 °F (36.3 °C) (Temporal)   Ht 5' 4\" (1.626 m)   Wt 220 lb 9.6 oz (100.1 kg)   SpO2 90%   BMI 37.87 kg/m²       Constitutional: Appears well-developed and well-nourished. No distress. Sitting comfortably in the exam room, interacting with conversation with pleasant affect. Gait appears steady and patient exhibits no evidence of ataxia. Patient is able to ambulate with caution. No focal neurological deficit noted. No facial droop, slurred speech, or evidence of altered mentation noted on exam.   Skin: Skin over the head, neck, bilateral limbs, and trunk is warm and dry. No rash or erythema noted. Cranial Nerves II-XII grossly intact  HENT: NC/AT. Normal symmetry, bulk and tone of facial and neck musculature. Trachea midline. No discernible thyromegaly or masses. No involuntary movements.    Lymphatic: No preauricular, submandibuar, anterior or posterior cervical lymphadenopathy. Psychiatric: The patient is awake, alert, and oriented to person, place and time. Behavior is normal. Thought content normal.   Cardiovascular: No clubbing, cyanosis. No edema bilateral lower extremities. Pulmonary: No tripoding nor accessory muscle recruitment. Breathing normally, no distress, no audible wheezing. Distal cap refill intact at 2/2 Wily UE / LE. Neuro intact Wily UE/LE to noxious stimuli        Ortho Specific exam:    Patient laying supine left knee exposed to reveal skin intact. No warmth, erythema, edema, or ecchymosis. There is a 1+ effusion. Patient is guarded with her range of motion noted at 95 degrees -5 degrees with a varus deformity in the extension plane. Patella tracks midline with crepitation. Quad and patellar tendons intact. MCL and LCL intact with no pain or guarding. No laxity noted. ACL and PCL intact with no laxity. There is popliteal fullness noted. No evidence of DVT or calf tenderness left lower extremity[facility] of the left knee AP lateral and tunnel reveals tricompartmental osteoarthritis with near complete collapse of the medial and patellofemoral joint spaces. Moderate collapse of the lateral joint space. No fracture deformities or dislocations identified. No lytic or blastic lesions. No soft tissue ossifications. IMPRESSION:      ICD-10-CM ICD-9-CM    1. Primary osteoarthritis of left knee  M17.12 715.16 AMB POC X-RAY KNEE 3 VIEW   2. Chronic pain of left knee  M25.562 719.46     G89.29 338.29    3. Severe obesity (BMI 35.0-39. 9) with comorbidity ([facility]  E66.01 278.01    4. Primary osteoarthritis of both knees  M17.0 715.16    5.  Effusion of knee joint, left  M25.462 719.06         PLAN: Today we discussed alternatives to care to include but not limited to a low-dose cortisone injection following aspiration for her osteoarthritic symptoms of the left knee. She does have symptoms in the right knee and would benefit from a cortisone injection however today her left knee is very symptomatic. She will then follow with us if necessary within a couple weeks for treatment of the right knee. Procedural: Using sterile technique and verbal and written consent obtained appropriate timeout formed patient laying supine left knee flexed to 20 degrees on a bolster 5 cc of 0.5% Marcaine used anesthetize a superior lateral interarticular approach. To follow 14 cc of straw-colored blood tinted aspirate removed from the same portal.  To follow 1 cc of Kenalog at 40 mg/mL mixed with 7 mils of Sensorcaine 0.75% injected. There were no complications. Patient tolerated the procedure well. Please Note:        [address] using a frequency of 10MHz with a 12L-RS transducer head was used to confirm needle placement. Ultrasound images captured using [address] Ultrasound machine and scanned into patient's chart. Chart reviewed for the following:   Delaney Johnston PA-C, have reviewed the History, Physical and updated the Allergic reactions for [address] performed immediately prior to start of procedure:   Jenni MCCLAIN PA-C, have performed the following reviews on Nithya Ates prior to the start of the procedure:            * Patient was identified by name and date of birth   * Agreement on procedure being performed was verified  * Risks and Benefits explained to the patient  * Procedure site verified and marked as necessary  * Patient was positioned for comfort  * Consent was signed and verified             Date of procedure: 06/08/22    Time: 1:51 PM    Procedure performed by:  Tj Ray PA-C    Provider assisted by: None     Patient assisted by: self    How tolerated by patient: tolerated the procedure well with no complications    Comments: none      Additionally today we discussed the diagnosis of obesity and the importance of weight management for both cardiovascular health. The patient was recommended to decrease carbohydrate and sugar intake. Patient recommended a formal dietary consult which they will consider and return a call to our office. In light of the patient's osteoarthritic findings I am making a recommendation for aerobic exercise to include but not limited to stationary bicycle, elliptical, therapeutic walking with good shoes and or swimming. Patient should avoid any running or jumping. If using the treadmill then recommendation for no elevation and no running or jogging. Care plan outlined and precautions discussed. Results were reviewed with the patient. All medications were reviewed with the patient. All of pt's questions and concerns were addressed. Alarm symptoms and return precautions associated with chief complaint and evaluation were reviewed with the patient in detail. The patient demonstrated adequate understanding. The patient expresses willing compliance with the treatment plan. Special note: Medication management discussed in detail all patient's questions answered to their satisfaction. No Narcotic indicated today. Patient given pain medication for short term acute pain relief. Goal is to treat patient according to above plan and to ultimately have patient off all pain medications once appropriate. If chronic pain management is required beyond what is expected for current orthopedic problem, will refer patient to pain management.  was reviewed and will be reviewed with every medication refill request.         Patient provided a reminder for a \"due or due soon\" health maintenance. I have asked the patient to schedule an appointment with their primary care provider for follow-up on general health maintenance concerns. Today all the patient's questions were answered to their satisfaction.   Copies of x-rays reviewed if obtained this visit, and provided to patient. Dictation disclaimer:  Please note that this dictation was completed with [employer], the computer voice recognition software. Quite often unanticipated grammatical, syntax, homophones, and other interpretive errors are inadvertently transcribed by the computer software. Please disregard these errors. Please excuse any errors that have escaped final proofreading. Malissa MORLEY, APC, MPAS, PA-C  [facility]

## 2022-06-23 ENCOUNTER — OFFICE VISIT (OUTPATIENT)
Dept: ORTHOPEDIC SURGERY | Age: 72
End: 2022-06-23
Payer: MEDICARE

## 2022-06-23 VITALS
WEIGHT: 214.2 LBS | HEART RATE: 87 BPM | BODY MASS INDEX: 36.57 KG/M2 | HEIGHT: 64 IN | TEMPERATURE: 97.1 F | OXYGEN SATURATION: 97 %

## 2022-06-23 DIAGNOSIS — M23.8X1 KNEE CREPITUS, RIGHT: ICD-10-CM

## 2022-06-23 DIAGNOSIS — M25.461 EFFUSION OF RIGHT KNEE JOINT: ICD-10-CM

## 2022-06-23 DIAGNOSIS — M25.661 DECREASED RANGE OF MOTION OF RIGHT KNEE: ICD-10-CM

## 2022-06-23 DIAGNOSIS — M25.561 ACUTE PAIN OF RIGHT KNEE: Primary | ICD-10-CM

## 2022-06-23 DIAGNOSIS — M17.0 PRIMARY OSTEOARTHRITIS OF BOTH KNEES: ICD-10-CM

## 2022-06-23 PROCEDURE — 20610 DRAIN/INJ JOINT/BURSA W/O US: CPT | Performed by: PHYSICIAN ASSISTANT

## 2022-06-23 RX ORDER — TRIAMCINOLONE ACETONIDE 40 MG/ML
40 INJECTION, SUSPENSION INTRA-ARTICULAR; INTRAMUSCULAR ONCE
Status: COMPLETED | OUTPATIENT
Start: 2022-06-23 | End: 2022-06-23

## 2022-06-23 RX ADMIN — TRIAMCINOLONE ACETONIDE 40 MG: 40 INJECTION, SUSPENSION INTRA-ARTICULAR; INTRAMUSCULAR at 14:50

## 2022-06-23 NOTE — PROGRESS NOTES
Patient: Cristina Majano                MRN: [id number]       SSN: [id number]  YOB: 1950        AGE: 73 y.o. SEX: female          PCP: Rohith Evans MD  06/23/22 6/23/2022: Patient returns the office with complaint of acute on chronic right knee pain. She notes swelling and pain began to the knee within the past 2 weeks. She was treated about 2 weeks ago for similar in the left knee and is doing very well. She has tricompartmental osteoarthritis end-stage of both knees. She would like an aspiration and following cortisone injection to her right knee today. Chief Complaint   Patient presents with    Knee Pain     medina       HISTORY:  Cristina Majano is a 73 y.o. female presents to the office with complaint of acute on chronic bilateral knee pain left greater than right. She has been seen under care of Dr. Sunshine Larose in the past and received cortisone injections to her knees which did help her symptoms. She has no trauma reported. She has tricompartmental osteoarthritis by report in both knees. The arthritic pain limits her ability to only stand for short periods of time and walk short distances. Tylenol and Motrin have been generally unsuccessful for symptom management. She has not tried Voltaren gel. Pain Assessment  6/23/2022   Location of Pain Knee   Location Modifiers Right;Left   Severity of Pain (No Data)   Quality of Pain Sharp   Quality of Pain Comment rt knee   Duration of Pain Persistent   Frequency of Pain Constant   Aggravating Factors Stairs; Walking;Standing;Squatting;Bending   Aggravating Factors Comment -   Limiting Behavior Yes   Relieving Factors Nothing   Relieving Factors Comment -   Result of Injury No   Work-Related Injury -   Type of Injury -   Type of Injury Comment -           Lab Results   Component Value Date/Time    Hemoglobin A1c 6.9 (H) 12/16/2021 09:55 PM     Weight Metrics 6/23/2022 6/8/2022 3/10/2022 12/19/2021 4/26/2021 4/8/2021 4/2/2021   Weight 214 lb 3.2 oz 220 lb 9.6 oz 225 lb 210 lb 1.6 oz 231 lb 12.8 oz 223 lb 229 lb   BMI 36.77 kg/m2 37.87 kg/m2 38.62 kg/m2 36.06 kg/m2 39.79 kg/m2 38.28 kg/m2 39.31 kg/m2            Problem List Items Addressed This Visit        Orthopedic Problems    Primary osteoarthritis of both knees      Other Visit Diagnoses     Acute pain of right knee    -  Primary    Relevant Medications    triamcinolone acetonide (KENALOG-40) 40 mg/mL injection 40 mg (Completed)    Other Relevant Orders    DRAIN/INJECT LARGE JOINT/BURSA    Effusion of right knee joint        Decreased range of motion of right knee        Knee crepitus, right        BMI 36.0-36.9,adult              PAST MEDICAL HISTORY:   Past Medical History:   Diagnosis Date    Abnormal WBC count 5/17/2016    Anemia     Bilateral shoulder pain 9/27/2016    Chondromalacia of both patellae     COPD (chronic obstructive pulmonary disease) [facility] 9/2015    mild-mod dz; Dr Arellano Nail    Diabetes [facility]) 2013    Diabetic eye exam ([facility] [facility] 2016    Dilated cardiomyopathy ([facility] [address] [facility]     Dyslipidemia     Gout     Heart attack ([facility] [address] [facility]     Heart attack ([facility] [address] [facility]     Heart attack ([facility] [address] 10/18/2019    History of echocardiogram 11/14/2014    Mild LVE. EF 40%. Mild, diffuse hypk. Mild LAE.   Mild MR.      Hypercholesteremia 1/08/14    Hypertension 2000    Noncompliance with medications 2/16/2016    Obesity     Orthostatic hypotension 5/17/2016    Osteoarthritis of both knees     Pain management 04/01/2016    Dr. Pamela De Souza Popliteal cyst, bilateral      Vitamin D deficiency 10/16/14       PAST SURGICAL HISTORY:   Past Surgical History:   Procedure Laterality Date    HX HEART CATHETERIZATION      HX TUBAL LIGATION         ALLERGIES: No Known Allergies     CURRENT MEDICATIONS:  A list of medications prior to the time of admission include:  Prior to Admission medications    Medication Sig Start Date End Date Taking? Authorizing Provider   tamsulosin (FLOMAX) 0.4 mg capsule Take 1 Capsule by mouth daily. 3/12/22   Broderick Rouse MD   bumetanide (BUMEX) 2 mg tablet Take 0.5 Tablets by mouth daily. Patient not taking: Reported on 3/8/2022 12/20/21   Telma Abraham DO   calcitRIOL (ROCALTROL) 0.25 mcg capsule Take 1 Cap by mouth daily. Patient not taking: Reported on 3/8/2022 3/24/21   Cody Diaz MD   cyanocobalamin 1,000 mcg tablet Take 1 Tab by mouth daily. Patient not taking: Reported on 3/8/2022 3/24/21   Cody Diaz MD   albuterol-ipratropium (DUO-NEB) 2.5 mg-0.5 mg/3 ml nebu 3 mL by Nebulization route every six (6) hours as needed for Wheezing. 3/23/21   Cody Diaz MD   atorvastatin (LIPITOR) 80 mg tablet Take 1 Tab by mouth daily. 1/25/21   Faviola Villegas MD   carvediloL (COREG) 25 mg tablet Take 1 Tab by mouth two (2) times daily (with meals). 1/25/21   Faviola Villegas MD   albuterol (PROVENTIL HFA, VENTOLIN HFA, PROAIR HFA) 90 mcg/actuation inhaler Take 2 Puffs by inhalation every four (4) hours as needed for Wheezing. 9/30/19   Cesilia Westfall MD   aspirin 81 mg chewable tablet Take 2 Tabs by mouth daily. 7/19/19   Kendra Rabago NP   insulin detemir (LEVEMIR) 100 unit/mL injection 15 units daily for diabetes 7/20/17   Iqra Irizarry DNP   desloratadine (CLARINEX) 5 mg tablet Take 1 Tab by mouth daily as needed for Allergies. 4/26/17   Karen Irizarry DNP   mometasone (NASONEX) 50 mcg/actuation nasal spray 2 Sprays by Both Nostrils route daily as needed. For allergies  Patient not taking: Reported on 3/8/2022 4/26/17   Karis Mayers DNP   umeclidinium-vilanterol [facility] ELLIP) 62.5-25 mcg/actuation inhaler Take 1 Puff by inhalation daily.  For COPD 4/26/17   Karis Mayers, DNP       FAMILY HISTORY:   Family History   Problem Relation Age of Onset    Diabetes Mother     Hypertension Mother     Hypertension Father    [employer] Disease Maternal Aunt 48        Dialysis       SOCIAL HISTORY:   Social History     Socioeconomic History    Marital status:     Number of children: 3   Occupational History    Occupation: retired   Tobacco Use    Smoking status: Former Smoker     Packs/day: 0.25     Years: 48.00     Pack years: 12.00     Types: Cigarettes     Quit date: 2018     Years since quittin.0    Smokeless tobacco: Never Used   Substance and Sexual Activity    Alcohol use: No     Alcohol/week: 0.0 standard drinks    Drug use: No    Sexual activity: Yes     Partners: Male   Other Topics Concern     Service No    Blood Transfusions No    Caffeine Concern No    Occupational Exposure No    Hobby Hazards No    Sleep Concern No    Stress Concern No    Weight Concern No    Special Diet No    Back Care No    Exercise No    Bike Helmet No    Seat Belt Yes    Self-Exams Yes       ROS:No CP, No SOB, No fever/chills nor night sweats. No headaches, vision abnormalities to include double and or loss of vision. No dizziness. No hearing abnormalities. No Chest Pain nor Shortness of breath. Pt denies h/o spinal surgery, injections, or PT/chiropractor. Patient has attempted self treatment with less than adequate relief on oral and topical analgesic / anti inflammatory medications . Pt denies change in bowel or bladder habits. No saddle paresthesia / anesthesia. Pt denies fever, unplanned weight loss / weight gains, and no skin changes. Musculoskeletal pain per HPI. Pain is exacerbated positionally. PHYSICAL EXAM:    Visit Vitals  Pulse 87   Temp 97.1 °F (36.2 °C) (Temporal)   Ht 5' 4\" (1.626 m)   Wt 214 lb 3.2 oz (97.2 kg)   SpO2 97%   BMI 36.77 kg/m²       Constitutional: Appears well-developed and well-nourished. No distress. Sitting comfortably in the exam room, interacting with conversation with pleasant affect. Gait appears steady and patient exhibits no evidence of ataxia.  Patient is able to ambulate with caution. No focal neurological deficit noted. No facial droop, slurred speech, or evidence of altered mentation noted on exam.   Skin: Skin over the head, neck, bilateral limbs, and trunk is warm and dry. No rash or erythema noted. Cranial Nerves II-XII grossly intact  HENT: NC/AT. Normal symmetry, bulk and tone of facial and neck musculature. Trachea midline. No discernible thyromegaly or masses. No involuntary movements. Lymphatic: No preauricular, submandibuar, anterior or posterior cervical lymphadenopathy. Psychiatric: The patient is awake, alert, and oriented to person, place and time. Behavior is normal. Thought content normal.   Cardiovascular: No clubbing, cyanosis. No edema bilateral lower extremities. Pulmonary: No tripoding nor accessory muscle recruitment. Breathing normally, no distress, no audible wheezing. Distal cap refill intact at 2/2 Wily UE / LE. Neuro intact Wily UE/LE to noxious stimuli        Ortho Specific exam:    Patient laying supine right knee exposed to reveal skin intact. No warmth, erythema, edema, or ecchymosis. There is a 1+ effusion. Patient is guarded with her range of motion noted at 95 degrees -5 degrees with a varus deformity in the extension plane. Patella tracks midline with crepitation. Quad and patellar tendons intact. MCL and LCL intact with no pain or guarding. No laxity noted. ACL and PCL intact with no laxity. There is popliteal fullness noted. No evidence of DVT or calf tenderness right lower extremity. [facility] view of the left knee AP lateral and tunnel reveals tricompartmental osteoarthritis with near complete collapse of the medial and patellofemoral joint spaces. Moderate collapse of the lateral joint space. No fracture deformities or dislocations identified. No lytic or blastic lesions. No soft tissue ossifications.                       IMPRESSION:      ICD-10-CM ICD-9-CM    1. Acute pain of right knee  M25.561 719.46 triamcinolone acetonide (KENALOG-40) 40 mg/mL injection 40 mg      DRAIN/INJECT LARGE JOINT/BURSA   2. Primary osteoarthritis of both knees  M17.0 715.16    3. Effusion of right knee joint  M25.461 719.06    4. Decreased range of motion of right knee  M25.661 719.56    5. Knee crepitus, right  M23.8X1 719.66    6. BMI 36.0-36.9,adult  Z68.36 V85.36         PLAN: Today we discussed alternatives to care to include but not limited to a low-dose cortisone injection following aspiration for her osteoarthritic symptoms of the right knee. Procedural: Using sterile technique and verbal and written consent obtained appropriate timeout formed patient laying supine left knee flexed to 20 degrees on a bolster 5 cc of 0.75% Sensorcaine used anesthetize a superior lateral interarticular approach. To follow 22 cc of straw-colored blood tinted aspirate removed from the same portal.  To follow 1 cc of Kenalog at 40 mg/mL mixed with 7 mils of Sensorcaine 0.75% injected. There were no complications. Patient tolerated the procedure well. Please Note:        [address] using a frequency of 10MHz with a 12L-RS transducer head was used to confirm needle placement. Ultrasound images captured using [address] Ultrasound machine and scanned into patient's chart. Chart reviewed for the following:   Soraya Johnston PA-C, have reviewed the History, Physical and updated the Allergic reactions for [address] performed immediately prior to start of procedure:   Rhiannon MCCLAIN PA-C, have performed the following reviews on Shorty Solid prior to the start of the procedure:            * Patient was identified by name and date of birth   * Agreement on procedure being performed was verified  * Risks and Benefits explained to the patient  * Procedure site verified and marked as necessary  * Patient was positioned for comfort  * Consent was signed and verified             Date of procedure: 06/23/22    Time: 1:51 PM    Procedure performed by:  Itz Calderon PA-C    Provider assisted by: None     Patient assisted by: self    How tolerated by patient: tolerated the procedure well with no complications    Comments: none      Additionally today we discussed the diagnosis of obesity and the importance of weight management for both cardiovascular health. The patient was recommended to decrease carbohydrate and sugar intake. Patient recommended a formal dietary consult which they will consider and return a call to our office. In light of the patient's osteoarthritic findings I am making a recommendation for aerobic exercise to include but not limited to stationary bicycle, elliptical, therapeutic walking with good shoes and or swimming. Patient should avoid any running or jumping. If using the treadmill then recommendation for no elevation and no running or jogging. Care plan outlined and precautions discussed. Results were reviewed with the patient. All medications were reviewed with the patient. All of pt's questions and concerns were addressed. Alarm symptoms and return precautions associated with chief complaint and evaluation were reviewed with the patient in detail. The patient demonstrated adequate understanding. The patient expresses willing compliance with the treatment plan. Special note: Medication management discussed in detail all patient's questions answered to their satisfaction. No Narcotic indicated today. Patient given pain medication for short term acute pain relief. Goal is to treat patient according to above plan and to ultimately have patient off all pain medications once appropriate. If chronic pain management is required beyond what is expected for current orthopedic problem, will refer patient to pain management.   was reviewed and will be reviewed with every medication refill request.         Patient provided a reminder for a \"due or due soon\" health maintenance. I have asked the patient to schedule an appointment with their primary care provider for follow-up on general health maintenance concerns. Today all the patient's questions were answered to their satisfaction. Copies of x-rays reviewed if obtained this visit, and provided to patient. Dictation disclaimer:  Please note that this dictation was completed with [employer], the computer voice recognition software. Quite often unanticipated grammatical, syntax, homophones, and other interpretive errors are inadvertently transcribed by the computer software. Please disregard these errors. Please excuse any errors that have escaped final proofreading. Margoth MORLEY, APC, MPAS, PA-C  [facility]

## 2022-06-25 ENCOUNTER — HOSPITAL ENCOUNTER (OUTPATIENT)
Age: 72
Setting detail: OBSERVATION
Discharge: HOME | End: 2022-06-29
Attending: EMERGENCY MEDICINE | Admitting: STUDENT IN AN ORGANIZED HEALTH CARE EDUCATION/TRAINING PROGRAM
Payer: MEDICARE

## 2022-06-25 ENCOUNTER — APPOINTMENT (OUTPATIENT)
Dept: GENERAL RADIOLOGY | Age: 72
End: 2022-06-25
Attending: EMERGENCY MEDICINE
Payer: MEDICARE

## 2022-06-25 DIAGNOSIS — N17.9 STAGE 1 ACUTE KIDNEY INJURY (HCC): ICD-10-CM

## 2022-06-25 DIAGNOSIS — I10 ESSENTIAL HYPERTENSION: Chronic | ICD-10-CM

## 2022-06-25 DIAGNOSIS — Z79.4 DIABETES MELLITUS TYPE 2, INSULIN DEPENDENT (HCC): Chronic | ICD-10-CM

## 2022-06-25 DIAGNOSIS — E11.9 DIABETES MELLITUS TYPE 2, INSULIN DEPENDENT (HCC): Chronic | ICD-10-CM

## 2022-06-25 DIAGNOSIS — E87.5 ACUTE HYPERKALEMIA: Primary | ICD-10-CM

## 2022-06-25 LAB
ALBUMIN SERPL-MCNC: 3.5 G/DL (ref 3.4–5)
ALBUMIN/GLOB SERPL: 1.1 {RATIO} (ref 0.8–1.7)
ALP SERPL-CCNC: 136 U/L (ref 45–117)
ALT SERPL-CCNC: 20 U/L (ref 13–56)
ANION GAP SERPL CALC-SCNC: 6 MMOL/L (ref 3–18)
ANION GAP SERPL CALC-SCNC: 6 MMOL/L (ref 3–18)
AST SERPL-CCNC: 8 U/L (ref 10–38)
BASOPHILS # BLD: 0 K/UL (ref 0–0.1)
BASOPHILS NFR BLD: 0 % (ref 0–2)
BILIRUB SERPL-MCNC: 0.3 MG/DL (ref 0.2–1)
BUN SERPL-MCNC: 61 MG/DL (ref 7–18)
BUN SERPL-MCNC: 63 MG/DL (ref 7–18)
BUN/CREAT SERPL: 28 (ref 12–20)
BUN/CREAT SERPL: 30 (ref 12–20)
CALCIUM SERPL-MCNC: 10 MG/DL (ref 8.5–10.1)
CALCIUM SERPL-MCNC: 9.6 MG/DL (ref 8.5–10.1)
CHLORIDE SERPL-SCNC: 113 MMOL/L (ref 100–111)
CHLORIDE SERPL-SCNC: 114 MMOL/L (ref 100–111)
CO2 SERPL-SCNC: 16 MMOL/L (ref 21–32)
CO2 SERPL-SCNC: 17 MMOL/L (ref 21–32)
CREAT SERPL-MCNC: 2.04 MG/DL (ref 0.6–1.3)
CREAT SERPL-MCNC: 2.25 MG/DL (ref 0.6–1.3)
DIFFERENTIAL METHOD BLD: ABNORMAL
EOSINOPHIL # BLD: 0 K/UL (ref 0–0.4)
EOSINOPHIL NFR BLD: 0 % (ref 0–5)
ERYTHROCYTE [DISTWIDTH] IN BLOOD BY AUTOMATED COUNT: 15.1 % (ref 11.6–14.5)
GLOBULIN SER CALC-MCNC: 3.3 G/DL (ref 2–4)
GLUCOSE SERPL-MCNC: 202 MG/DL (ref 74–99)
GLUCOSE SERPL-MCNC: 259 MG/DL (ref 74–99)
HCT VFR BLD AUTO: 37 % (ref 35–45)
HGB BLD-MCNC: 11.4 G/DL (ref 12–16)
IMM GRANULOCYTES # BLD AUTO: 0.1 K/UL (ref 0–0.04)
IMM GRANULOCYTES NFR BLD AUTO: 1 % (ref 0–0.5)
LYMPHOCYTES # BLD: 0.6 K/UL (ref 0.9–3.6)
LYMPHOCYTES NFR BLD: 6 % (ref 21–52)
MCH RBC QN AUTO: 27.9 PG (ref 24–34)
MCHC RBC AUTO-ENTMCNC: 30.8 G/DL (ref 31–37)
MCV RBC AUTO: 90.5 FL (ref 78–100)
MONOCYTES # BLD: 0.7 K/UL (ref 0.05–1.2)
MONOCYTES NFR BLD: 7 % (ref 3–10)
NEUTS SEG # BLD: 8.6 K/UL (ref 1.8–8)
NEUTS SEG NFR BLD: 87 % (ref 40–73)
NRBC # BLD: 0.09 K/UL (ref 0–0.01)
NRBC BLD-RTO: 0.9 PER 100 WBC
PLATELET # BLD AUTO: 333 K/UL (ref 135–420)
PMV BLD AUTO: 9.9 FL (ref 9.2–11.8)
POTASSIUM SERPL-SCNC: 6.7 MMOL/L (ref 3.5–5.5)
POTASSIUM SERPL-SCNC: 7.1 MMOL/L (ref 3.5–5.5)
PROT SERPL-MCNC: 6.8 G/DL (ref 6.4–8.2)
RBC # BLD AUTO: 4.09 M/UL (ref 4.2–5.3)
SODIUM SERPL-SCNC: 136 MMOL/L (ref 136–145)
SODIUM SERPL-SCNC: 136 MMOL/L (ref 136–145)
WBC # BLD AUTO: 9.9 K/UL (ref 4.6–13.2)

## 2022-06-25 PROCEDURE — 80053 COMPREHEN METABOLIC PANEL: CPT

## 2022-06-25 PROCEDURE — 93005 ELECTROCARDIOGRAM TRACING: CPT

## 2022-06-25 PROCEDURE — 71046 X-RAY EXAM CHEST 2 VIEWS: CPT

## 2022-06-25 PROCEDURE — 99285 EMERGENCY DEPT VISIT HI MDM: CPT

## 2022-06-25 PROCEDURE — 85025 COMPLETE CBC W/AUTO DIFF WBC: CPT

## 2022-06-25 NOTE — Clinical Note
Status[de-identified] INPATIENT [101]   Type of Bed: Stepdown [17]   Cardiac Monitoring Required?: Yes   Inpatient Hospitalization Certified Necessary for the Following Reasons: 3.  Patient receiving treatment that can only be provided in an inpatient setting (further clarification in H&P documentation)   Admitting Diagnosis: Acute renal failure (ARF) [facility]) [[id number]]   Admitting Physician: Autumn Deleon [[id number]]   Attending Physician: Autumn Deleon [[id number]]   Estimated Length of Stay: 5-7 Midnights   Discharge Plan[de-identified] Home with Office Follow-up

## 2022-06-26 PROBLEM — C20 RECTAL CANCER (HCC): Status: ACTIVE | Noted: 2022-06-26

## 2022-06-26 PROBLEM — C50.919 BREAST CANCER (HCC): Status: ACTIVE | Noted: 2022-06-26

## 2022-06-26 PROBLEM — E87.5 ACUTE HYPERKALEMIA: Status: ACTIVE | Noted: 2022-06-26

## 2022-06-26 LAB
ALBUMIN SERPL-MCNC: 3.3 G/DL (ref 3.4–5)
ANION GAP SERPL CALC-SCNC: 5 MMOL/L (ref 3–18)
ANION GAP SERPL CALC-SCNC: 7 MMOL/L (ref 3–18)
ANION GAP SERPL CALC-SCNC: 9 MMOL/L (ref 3–18)
APPEARANCE UR: CLEAR
ATRIAL RATE: 59 BPM
BACTERIA URNS QL MICRO: ABNORMAL /HPF
BASE DEFICIT BLDV-SCNC: 12.5 MMOL/L
BILIRUB UR QL: NEGATIVE
BUN SERPL-MCNC: 52 MG/DL (ref 7–18)
BUN SERPL-MCNC: 53 MG/DL (ref 7–18)
BUN SERPL-MCNC: 58 MG/DL (ref 7–18)
BUN/CREAT SERPL: 28 (ref 12–20)
BUN/CREAT SERPL: 29 (ref 12–20)
BUN/CREAT SERPL: 30 (ref 12–20)
CALCIUM SERPL-MCNC: 9 MG/DL (ref 8.5–10.1)
CALCIUM SERPL-MCNC: 9.2 MG/DL (ref 8.5–10.1)
CALCIUM SERPL-MCNC: 9.5 MG/DL (ref 8.5–10.1)
CALCULATED P AXIS, ECG09: 42 DEGREES
CALCULATED R AXIS, ECG10: 11 DEGREES
CALCULATED T AXIS, ECG11: 101 DEGREES
CHLORIDE SERPL-SCNC: 112 MMOL/L (ref 100–111)
CHLORIDE SERPL-SCNC: 112 MMOL/L (ref 100–111)
CHLORIDE SERPL-SCNC: 114 MMOL/L (ref 100–111)
CO2 SERPL-SCNC: 16 MMOL/L (ref 21–32)
CO2 SERPL-SCNC: 18 MMOL/L (ref 21–32)
CO2 SERPL-SCNC: 20 MMOL/L (ref 21–32)
COLOR UR: YELLOW
CREAT SERPL-MCNC: 1.82 MG/DL (ref 0.6–1.3)
CREAT SERPL-MCNC: 1.89 MG/DL (ref 0.6–1.3)
CREAT SERPL-MCNC: 1.91 MG/DL (ref 0.6–1.3)
CREAT UR-MCNC: 78 MG/DL (ref 30–125)
DIAGNOSIS, 93000: NORMAL
EPITH CASTS URNS QL MICRO: ABNORMAL /LPF (ref 0–5)
GLUCOSE BLD STRIP.AUTO-MCNC: 138 MG/DL (ref 70–110)
GLUCOSE SERPL-MCNC: 134 MG/DL (ref 74–99)
GLUCOSE SERPL-MCNC: 138 MG/DL (ref 74–99)
GLUCOSE SERPL-MCNC: 155 MG/DL (ref 74–99)
GLUCOSE UR STRIP.AUTO-MCNC: NEGATIVE MG/DL
HCO3 BLDV-SCNC: 15.6 MMOL/L (ref 23–28)
HGB UR QL STRIP: NEGATIVE
KETONES UR QL STRIP.AUTO: NEGATIVE MG/DL
LEUKOCYTE ESTERASE UR QL STRIP.AUTO: NEGATIVE
NITRITE UR QL STRIP.AUTO: NEGATIVE
P-R INTERVAL, ECG05: 200 MS
PCO2 BLDV: 43 MMHG (ref 41–51)
PH BLDV: 7.17 [PH] (ref 7.32–7.42)
PH UR STRIP: 5 [PH] (ref 5–8)
PHOSPHATE SERPL-MCNC: 4 MG/DL (ref 2.5–4.9)
PO2 BLDV: 38 MMHG (ref 25–40)
POTASSIUM SERPL-SCNC: 5.5 MMOL/L (ref 3.5–5.5)
POTASSIUM SERPL-SCNC: 6.4 MMOL/L (ref 3.5–5.5)
POTASSIUM SERPL-SCNC: 6.7 MMOL/L (ref 3.5–5.5)
PROT UR STRIP-MCNC: 30 MG/DL
Q-T INTERVAL, ECG07: 408 MS
QRS DURATION, ECG06: 88 MS
QTC CALCULATION (BEZET), ECG08: 403 MS
RBC #/AREA URNS HPF: NEGATIVE /HPF (ref 0–5)
SAO2 % BLDV: 58.6 % (ref 65–88)
SERVICE CMNT-IMP: ABNORMAL
SODIUM SERPL-SCNC: 135 MMOL/L (ref 136–145)
SODIUM SERPL-SCNC: 137 MMOL/L (ref 136–145)
SODIUM SERPL-SCNC: 141 MMOL/L (ref 136–145)
SODIUM UR-SCNC: 94 MMOL/L (ref 20–110)
SP GR UR REFRACTOMETRY: 1.01 (ref 1–1.03)
SPECIMEN TYPE: ABNORMAL
UROBILINOGEN UR QL STRIP.AUTO: 0.2 EU/DL (ref 0.2–1)
VENTRICULAR RATE, ECG03: 59 BPM
WBC URNS QL MICRO: ABNORMAL /HPF (ref 0–4)

## 2022-06-26 PROCEDURE — 82962 GLUCOSE BLOOD TEST: CPT

## 2022-06-26 PROCEDURE — 84300 ASSAY OF URINE SODIUM: CPT

## 2022-06-26 PROCEDURE — 74011000250 HC RX REV CODE- 250: Performed by: EMERGENCY MEDICINE

## 2022-06-26 PROCEDURE — G0378 HOSPITAL OBSERVATION PER HR: HCPCS

## 2022-06-26 PROCEDURE — 74011636637 HC RX REV CODE- 636/637: Performed by: EMERGENCY MEDICINE

## 2022-06-26 PROCEDURE — 96365 THER/PROPH/DIAG IV INF INIT: CPT

## 2022-06-26 PROCEDURE — 96374 THER/PROPH/DIAG INJ IV PUSH: CPT

## 2022-06-26 PROCEDURE — 96366 THER/PROPH/DIAG IV INF ADDON: CPT

## 2022-06-26 PROCEDURE — 96367 TX/PROPH/DG ADDL SEQ IV INF: CPT

## 2022-06-26 PROCEDURE — 82803 BLOOD GASES ANY COMBINATION: CPT

## 2022-06-26 PROCEDURE — 74011250636 HC RX REV CODE- 250/636: Performed by: STUDENT IN AN ORGANIZED HEALTH CARE EDUCATION/TRAINING PROGRAM

## 2022-06-26 PROCEDURE — 82570 ASSAY OF URINE CREATININE: CPT

## 2022-06-26 PROCEDURE — 96375 TX/PRO/DX INJ NEW DRUG ADDON: CPT

## 2022-06-26 PROCEDURE — 74011000250 HC RX REV CODE- 250: Performed by: STUDENT IN AN ORGANIZED HEALTH CARE EDUCATION/TRAINING PROGRAM

## 2022-06-26 PROCEDURE — 80069 RENAL FUNCTION PANEL: CPT

## 2022-06-26 PROCEDURE — 74011250637 HC RX REV CODE- 250/637: Performed by: EMERGENCY MEDICINE

## 2022-06-26 PROCEDURE — 74011250637 HC RX REV CODE- 250/637: Performed by: STUDENT IN AN ORGANIZED HEALTH CARE EDUCATION/TRAINING PROGRAM

## 2022-06-26 PROCEDURE — 96376 TX/PRO/DX INJ SAME DRUG ADON: CPT

## 2022-06-26 PROCEDURE — 81001 URINALYSIS AUTO W/SCOPE: CPT

## 2022-06-26 PROCEDURE — 80048 BASIC METABOLIC PNL TOTAL CA: CPT

## 2022-06-26 PROCEDURE — 65660000004 HC RM CVT STEPDOWN

## 2022-06-26 PROCEDURE — 74011250636 HC RX REV CODE- 250/636: Performed by: EMERGENCY MEDICINE

## 2022-06-26 PROCEDURE — 96361 HYDRATE IV INFUSION ADD-ON: CPT

## 2022-06-26 RX ORDER — DEXTROSE MONOHYDRATE 100 MG/ML
0-250 INJECTION, SOLUTION INTRAVENOUS AS NEEDED
Status: DISCONTINUED | OUTPATIENT
Start: 2022-06-26 | End: 2022-06-29 | Stop reason: HOSPADM

## 2022-06-26 RX ORDER — SODIUM POLYSTYRENE SULFONATE 15 G/60ML
15 SUSPENSION ORAL; RECTAL
Status: COMPLETED | OUTPATIENT
Start: 2022-06-26 | End: 2022-06-26

## 2022-06-26 RX ORDER — FUROSEMIDE 10 MG/ML
40 INJECTION INTRAMUSCULAR; INTRAVENOUS
Status: COMPLETED | OUTPATIENT
Start: 2022-06-26 | End: 2022-06-26

## 2022-06-26 RX ORDER — SODIUM POLYSTYRENE SULFONATE 15 G/60ML
30 SUSPENSION ORAL; RECTAL
Status: COMPLETED | OUTPATIENT
Start: 2022-06-26 | End: 2022-06-26

## 2022-06-26 RX ORDER — SODIUM BICARBONATE 1 MEQ/ML
50 SYRINGE (ML) INTRAVENOUS ONCE
Status: COMPLETED | OUTPATIENT
Start: 2022-06-26 | End: 2022-06-26

## 2022-06-26 RX ORDER — CALCIUM GLUCONATE 94 MG/ML
1 INJECTION, SOLUTION INTRAVENOUS ONCE
Status: COMPLETED | OUTPATIENT
Start: 2022-06-26 | End: 2022-06-26

## 2022-06-26 RX ADMIN — SODIUM CHLORIDE 1000 ML: 900 INJECTION, SOLUTION INTRAVENOUS at 09:59

## 2022-06-26 RX ADMIN — FUROSEMIDE 40 MG: 10 INJECTION, SOLUTION INTRAMUSCULAR; INTRAVENOUS at 09:59

## 2022-06-26 RX ADMIN — CALCIUM GLUCONATE 1 G: 98 INJECTION, SOLUTION INTRAVENOUS at 04:27

## 2022-06-26 RX ADMIN — DEXTROSE MONOHYDRATE 250 ML: 10 INJECTION, SOLUTION INTRAVENOUS at 01:51

## 2022-06-26 RX ADMIN — Medication 10 UNITS: at 01:51

## 2022-06-26 RX ADMIN — DEXTROSE MONOHYDRATE 250 ML: 10 INJECTION, SOLUTION INTRAVENOUS at 00:36

## 2022-06-26 RX ADMIN — SODIUM POLYSTYRENE SULFONATE 30 G: 15 SUSPENSION ORAL; RECTAL at 11:21

## 2022-06-26 RX ADMIN — SODIUM CHLORIDE 1000 ML: 900 INJECTION, SOLUTION INTRAVENOUS at 06:43

## 2022-06-26 RX ADMIN — SODIUM BICARBONATE 50 MEQ: 84 INJECTION, SOLUTION INTRAVENOUS at 09:37

## 2022-06-26 RX ADMIN — SODIUM POLYSTYRENE SULFONATE 15 G: 15 SUSPENSION ORAL; RECTAL at 04:56

## 2022-06-26 RX ADMIN — Medication 10 UNITS: at 00:36

## 2022-06-26 RX ADMIN — SODIUM BICARBONATE: 84 INJECTION, SOLUTION INTRAVENOUS at 11:01

## 2022-06-26 NOTE — ED PROVIDER NOTES
HPI  Patient is a 72 yo female who went to [facility] 2 weeks ago oswaldo had blood done for routine wellness check up. She .recieved a call today by her PCP  to go to ED for lab values high. Unsure exact blood work value abnormality it was. Past Medical History:   Diagnosis Date    Abnormal WBC count 2016    Anemia     Bilateral shoulder pain 2016    Chondromalacia of both patellae     COPD (chronic obstructive [facility] disease) ([facility] 2015    mild-mod dz; Dr Onel Herrera    Diabetes [facility])     Diabetic eye exam ([facility]     Dilated cardiomyopathy ([facility]     Dyslipidemia     Gout     Heart attack ([facility]     Heart attack ([facility]     Heart attack ([facility] 10/18/2019    History of echocardiogram 2014    Mild LVE. EF 40%. Mild, diffuse hypk. Mild LAE. Mild MR.      Hypercholesteremia 14    Hypertension 2000    Noncompliance with medications 2016    Obesity     Orthostatic hypotension 2016    Osteoarthritis of both knees     Pain management 2016    Dr. Khoa El Popliteal cyst, bilateral      Vitamin D deficiency 10/16/14       Past Surgical History:   Procedure Laterality Date    HX HEART CATHETERIZATION      HX TUBAL LIGATION           Family History:   Problem Relation Age of Onset    Diabetes Mother     Hypertension Mother     Hypertension Father     Kidney Disease Maternal Aunt 48        Dialysis       Social History     Socioeconomic History    Marital status:      Spouse name: Not on file    Number of children: 3    Years of education: Not on file    Highest education level: Not on file   Occupational History    Occupation: retired   Tobacco Use    Smoking status: Former Smoker     Packs/day: 0.25     Years: 48.00     Pack years: 12.00     Types: Cigarettes     Quit date: 2018     Years since quittin.1    Smokeless tobacco: Never Used   Substance and Sexual Activity    Alcohol use:  No Alcohol/week: 0.0 standard drinks    Drug use: No    Sexual activity: Yes     Partners: Male   Other Topics Concern     Service No    Blood Transfusions No    Caffeine Concern No    Occupational Exposure No    Hobby Hazards No    Sleep Concern No    Stress Concern No    Weight Concern No    Special Diet No    Back Care No    Exercise No    Bike Helmet No    Seat Belt Yes    Self-Exams Yes   Social History Narrative    Not on file     Social Determinants of Health     Financial Resource Strain:     Difficulty of Paying Living Expenses: Not on file   Food Insecurity:     Worried About Running Out of Food in the Last Year: Not on file    Ania of Food in the Last Year: Not on file   Transportation Needs:     Lack of Transportation (Medical): Not on file    Lack of Transportation (Non-Medical): Not on file   Physical Activity:     Days of Exercise per Week: Not on file    Minutes of Exercise per Session: Not on file   Stress:     Feeling of Stress : Not on file   Social Connections:     Frequency of Communication with Friends and Family: Not on file    Frequency of Social Gatherings with Friends and Family: Not on file    Attends Sabianist Services: Not on file    Active Member of [address] [place] [address] or Organizations: Not on file    Attends Club or Organization Meetings: Not on file    Marital Status: Not on file   Intimate Partner Violence:     Fear of Current or Ex-Partner: Not on file    Emotionally Abused: Not on file    Physically Abused: Not on file    Sexually Abused: Not on file   Housing Stability:     Unable to Pay for Housing in the Last Year: Not on file    Number of [place] in the Last Year: Not on file    Unstable Housing in the Last Year: Not on file         ALLERGIES: Patient has no known allergies. Review of Systems   Constitutional: Negative. HENT: Negative. Eyes: Negative. Respiratory: Negative. Cardiovascular: Negative.     Gastrointestinal: Negative. Endocrine: Negative. Genitourinary: Negative. Musculoskeletal: Negative. Skin: Negative. Allergic/Immunologic: Negative. Neurological: Negative. Hematological: Negative. Psychiatric/Behavioral: Negative. All other systems reviewed and are negative. Vitals:    06/25/22 2158   BP: (!) 147/74   Pulse: 63   Resp: 16   Temp: 98.3 °F (36.8 °C)   SpO2: 97%   Weight: 97.1 kg (214 lb)            Physical Exam  Vitals and nursing note reviewed. Constitutional:       General: She is not in acute distress. Appearance: She is well-developed. She is not diaphoretic. HENT:      Head: Normocephalic. Right Ear: External ear normal.      Left Ear: External ear normal.      Mouth/Throat:      Pharynx: No oropharyngeal exudate. Eyes:      General: No scleral icterus. Right eye: No discharge. Left eye: No discharge. Conjunctiva/sclera: Conjunctivae normal.      Pupils: Pupils are equal, round, and reactive to light. Neck:      Thyroid: No thyromegaly. Vascular: No JVD. Trachea: No tracheal deviation. Cardiovascular:      Rate and Rhythm: Normal rate and regular rhythm. Heart sounds: Normal heart sounds. No murmur heard. No friction rub. No gallop. Pulmonary:      Effort: Pulmonary effort is normal. No respiratory distress. Breath sounds: Normal breath sounds. No stridor. No wheezing or rales. Chest:      Chest wall: No tenderness. Abdominal:      General: Bowel sounds are normal. There is no distension. Palpations: Abdomen is soft. There is no mass. Tenderness: There is no abdominal tenderness. There is no guarding or rebound. Musculoskeletal:         General: No tenderness. Normal range of motion. Cervical back: Normal range of motion and neck supple. Lymphadenopathy:      Cervical: No cervical adenopathy. Skin:     General: Skin is warm and dry. Coloration: Skin is not pale.       Findings: No erythema or rash. Neurological:      Mental Status: She is alert and oriented to person, place, and time. Cranial Nerves: No cranial nerve deficit. Motor: No abnormal muscle tone. Coordination: Coordination normal.      Deep Tendon Reflexes: Reflexes normal.          [facility]  ED Course as of 06/28/22 1119   Sat Jun 25, 2022   2319 Potassium(!!): 6.7 [KB]   Sun Jun 26, 2022   0930 pH, venous (POC)(!!): 7.17 [JK]   1002 Case discussed with Dr. Joycelyn Denny, nephrology, he concurs with current management plan to include bicarbonate infusion, Lasix, and continued Kayexalate. Given patient's continued clinical stability, improvement, and subacute presentation patient should be stable for a non-ICU setting. Will contact hospitalist team for admission to stepdown unit. [JK]   Mon Jun 27, 2022   0930 Updated Dr. Rockney Meckel on patient's condition and the fact the patient was downgraded to a telemetry bed rather than stepdown. [JK]      ED Course User Index  [JK] Yogesh Spencer MD  [KB] Chantel Mosquera MD       Procedures    Differential diagnosis:    Disposition:    Dictation disclaimer:  Please note that this dictation was completed with [employer], the computer voice recognition software. Quite often unanticipated grammatical, syntax, homophones, and other interpretive errors are inadvertently transcribed by the computer software. Please disregard these errors. Please excuse any errors that have escaped final proofreading. Pt care transferred to Dr. Kailee Perez ,ED provider. History of patient complaint(s), available diagnostic reports and current treatment plan has been discussed thoroughly.

## 2022-06-26 NOTE — ED NOTES
Report received from [address] [place] [address], assumed care of this patient at this time.

## 2022-06-26 NOTE — ED NOTES
Pt resting on hospital bed on monitor with call bell in reach. Pt denies pain and no distress noted at this time. Pt encouraged to call for assistance if needed, verbalized understanding.

## 2022-06-26 NOTE — PROGRESS NOTES
Patient ambulated to bathroom, standby assist. Placed back on cardiac monitor. Blood glucose 138 mg/dL.

## 2022-06-26 NOTE — ED NOTES
Patient was in the emergency department awaiting transfer and admission to the ICU at [facility], case was discussed with Dr. Patrick Juarez at shift change. Patient presents with hyperkalemia due to presumed acute on chronic renal failure. Patient's potassium has not responded to potassium binders more insulin given prior to my arrival, I attempted IV hydration which is had a modest improvement in her symptoms however she remains significantly acidotic with a nongap metabolic acidosis and a pH of 7.16. The ICU team, Dr. Cholo Ellis, reached out to me informing this patient would likely not require ICU admission, will reach out to nephrology team to discuss the case with them. Have since placed patient on bicarb infusion as well as bicarb bolus and will also give pacing Lasix. Repeat BMP is improved following IV hydration with potassium of 6.4 but given continued metabolic acidosis we will continue resuscitation and treatment.

## 2022-06-26 NOTE — ED TRIAGE NOTES
Went to [facility] and had blood done for routine wellness check up. ..recieved call today to go to ED for lab values high.  Unsure exact blood work value abn

## 2022-06-27 PROBLEM — I25.10 CAD (CORONARY ARTERY DISEASE): Chronic | Status: ACTIVE | Noted: 2019-10-18

## 2022-06-27 PROBLEM — N17.9 STAGE 1 ACUTE KIDNEY INJURY (HCC): Status: ACTIVE | Noted: 2021-12-18

## 2022-06-27 PROBLEM — I50.22 CHRONIC SYSTOLIC CONGESTIVE HEART FAILURE (HCC): Chronic | Status: ACTIVE | Noted: 2019-11-07

## 2022-06-27 PROBLEM — I25.2 HISTORY OF MYOCARDIAL INFARCTION: Status: ACTIVE | Noted: 2022-06-27

## 2022-06-27 PROBLEM — C50.919 BREAST CANCER (HCC): Chronic | Status: ACTIVE | Noted: 2022-06-26

## 2022-06-27 PROBLEM — I50.40 COMBINED SYSTOLIC AND DIASTOLIC CONGESTIVE HEART FAILURE (HCC): Chronic | Status: ACTIVE | Noted: 2019-08-26

## 2022-06-27 PROBLEM — J96.11 CHRONIC RESPIRATORY FAILURE WITH HYPOXIA (HCC): Chronic | Status: ACTIVE | Noted: 2022-06-27

## 2022-06-27 PROBLEM — E11.21 TYPE 2 DIABETES WITH NEPHROPATHY (HCC): Chronic | Status: ACTIVE | Noted: 2018-07-12

## 2022-06-27 PROBLEM — I25.2 HISTORY OF MYOCARDIAL INFARCTION: Chronic | Status: ACTIVE | Noted: 2022-06-27

## 2022-06-27 PROBLEM — J96.11 CHRONIC RESPIRATORY FAILURE WITH HYPOXIA (HCC): Status: ACTIVE | Noted: 2022-06-27

## 2022-06-27 PROBLEM — C20 RECTAL CANCER (HCC): Chronic | Status: ACTIVE | Noted: 2022-06-26

## 2022-06-27 LAB
ALBUMIN SERPL-MCNC: 3 G/DL (ref 3.4–5)
ANION GAP SERPL CALC-SCNC: 7 MMOL/L (ref 3–18)
BUN SERPL-MCNC: 50 MG/DL (ref 7–18)
BUN/CREAT SERPL: 32 (ref 12–20)
CALCIUM SERPL-MCNC: 8.5 MG/DL (ref 8.5–10.1)
CHLORIDE SERPL-SCNC: 108 MMOL/L (ref 100–111)
CO2 SERPL-SCNC: 26 MMOL/L (ref 21–32)
CREAT SERPL-MCNC: 1.55 MG/DL (ref 0.6–1.3)
GLUCOSE SERPL-MCNC: 118 MG/DL (ref 74–99)
PHOSPHATE SERPL-MCNC: 3.5 MG/DL (ref 2.5–4.9)
POTASSIUM SERPL-SCNC: 4.6 MMOL/L (ref 3.5–5.5)
SODIUM SERPL-SCNC: 141 MMOL/L (ref 136–145)

## 2022-06-27 PROCEDURE — 96365 THER/PROPH/DIAG IV INF INIT: CPT

## 2022-06-27 PROCEDURE — 96366 THER/PROPH/DIAG IV INF ADDON: CPT

## 2022-06-27 PROCEDURE — 74011250637 HC RX REV CODE- 250/637: Performed by: STUDENT IN AN ORGANIZED HEALTH CARE EDUCATION/TRAINING PROGRAM

## 2022-06-27 PROCEDURE — 80053 COMPREHEN METABOLIC PANEL: CPT

## 2022-06-27 PROCEDURE — 84100 ASSAY OF PHOSPHORUS: CPT

## 2022-06-27 PROCEDURE — 85025 COMPLETE CBC W/AUTO DIFF WBC: CPT

## 2022-06-27 PROCEDURE — 80069 RENAL FUNCTION PANEL: CPT

## 2022-06-27 PROCEDURE — G0378 HOSPITAL OBSERVATION PER HR: HCPCS

## 2022-06-27 PROCEDURE — 85610 PROTHROMBIN TIME: CPT

## 2022-06-27 PROCEDURE — 74011000250 HC RX REV CODE- 250: Performed by: STUDENT IN AN ORGANIZED HEALTH CARE EDUCATION/TRAINING PROGRAM

## 2022-06-27 PROCEDURE — 96376 TX/PRO/DX INJ SAME DRUG ADON: CPT

## 2022-06-27 PROCEDURE — 2709999900 HC NON-CHARGEABLE SUPPLY

## 2022-06-27 PROCEDURE — 96374 THER/PROPH/DIAG INJ IV PUSH: CPT

## 2022-06-27 PROCEDURE — 36415 COLL VENOUS BLD VENIPUNCTURE: CPT

## 2022-06-27 PROCEDURE — 65270000029 HC RM PRIVATE

## 2022-06-27 RX ORDER — EZETIMIBE 10 MG/1
10 TABLET ORAL DAILY
COMMUNITY
End: 2022-08-08

## 2022-06-27 RX ORDER — ONDANSETRON 2 MG/ML
4 INJECTION INTRAMUSCULAR; INTRAVENOUS
Status: DISCONTINUED | OUTPATIENT
Start: 2022-06-27 | End: 2022-06-29 | Stop reason: HOSPADM

## 2022-06-27 RX ORDER — SPIRONOLACTONE 25 MG/1
25 TABLET ORAL DAILY
COMMUNITY
End: 2022-06-29

## 2022-06-27 RX ORDER — DEXTROSE MONOHYDRATE 100 MG/ML
0-250 INJECTION, SOLUTION INTRAVENOUS AS NEEDED
Status: DISCONTINUED | OUTPATIENT
Start: 2022-06-27 | End: 2022-06-29 | Stop reason: HOSPADM

## 2022-06-27 RX ORDER — CARVEDILOL 25 MG/1
25 TABLET ORAL 2 TIMES DAILY WITH MEALS
Status: DISCONTINUED | OUTPATIENT
Start: 2022-06-27 | End: 2022-06-29 | Stop reason: HOSPADM

## 2022-06-27 RX ORDER — SODIUM CHLORIDE 0.9 % (FLUSH) 0.9 %
5-40 SYRINGE (ML) INJECTION AS NEEDED
Status: DISCONTINUED | OUTPATIENT
Start: 2022-06-27 | End: 2022-06-29 | Stop reason: HOSPADM

## 2022-06-27 RX ORDER — ENOXAPARIN SODIUM 100 MG/ML
40 INJECTION SUBCUTANEOUS DAILY
Status: DISCONTINUED | OUTPATIENT
Start: 2022-06-28 | End: 2022-06-29 | Stop reason: HOSPADM

## 2022-06-27 RX ORDER — ACETAMINOPHEN 650 MG/1
650 SUPPOSITORY RECTAL
Status: DISCONTINUED | OUTPATIENT
Start: 2022-06-27 | End: 2022-06-29 | Stop reason: HOSPADM

## 2022-06-27 RX ORDER — ATORVASTATIN CALCIUM 40 MG/1
80 TABLET, FILM COATED ORAL DAILY
Status: DISCONTINUED | OUTPATIENT
Start: 2022-06-28 | End: 2022-06-29 | Stop reason: HOSPADM

## 2022-06-27 RX ORDER — ONDANSETRON 4 MG/1
4 TABLET, ORALLY DISINTEGRATING ORAL
Status: DISCONTINUED | OUTPATIENT
Start: 2022-06-27 | End: 2022-06-29 | Stop reason: HOSPADM

## 2022-06-27 RX ORDER — TIZANIDINE 4 MG/1
4 TABLET ORAL
COMMUNITY
End: 2022-09-22

## 2022-06-27 RX ORDER — HYDRALAZINE HYDROCHLORIDE 20 MG/ML
10 INJECTION INTRAMUSCULAR; INTRAVENOUS
Status: DISCONTINUED | OUTPATIENT
Start: 2022-06-27 | End: 2022-06-29 | Stop reason: HOSPADM

## 2022-06-27 RX ORDER — INSULIN LISPRO 100 [IU]/ML
INJECTION, SOLUTION INTRAVENOUS; SUBCUTANEOUS
Status: DISCONTINUED | OUTPATIENT
Start: 2022-06-28 | End: 2022-06-29 | Stop reason: HOSPADM

## 2022-06-27 RX ORDER — SULINDAC 200 MG/1
200 TABLET ORAL 2 TIMES DAILY
COMMUNITY
End: 2022-06-29

## 2022-06-27 RX ORDER — GABAPENTIN 400 MG/1
400 CAPSULE ORAL DAILY
COMMUNITY

## 2022-06-27 RX ORDER — ARFORMOTEROL TARTRATE 15 UG/2ML
15 SOLUTION RESPIRATORY (INHALATION)
Status: DISCONTINUED | OUTPATIENT
Start: 2022-06-27 | End: 2022-06-29 | Stop reason: HOSPADM

## 2022-06-27 RX ORDER — CHOLECALCIFEROL (VITAMIN D3) 125 MCG
5 CAPSULE ORAL
Status: DISCONTINUED | OUTPATIENT
Start: 2022-06-27 | End: 2022-06-28 | Stop reason: SDUPTHER

## 2022-06-27 RX ORDER — CHLORTHALIDONE 25 MG/1
25 TABLET ORAL DAILY
Status: ON HOLD | COMMUNITY
End: 2022-06-28

## 2022-06-27 RX ORDER — GUAIFENESIN 100 MG/5ML
162 LIQUID (ML) ORAL DAILY
Status: DISCONTINUED | OUTPATIENT
Start: 2022-06-28 | End: 2022-06-29 | Stop reason: HOSPADM

## 2022-06-27 RX ORDER — EZETIMIBE 10 MG/1
10 TABLET ORAL DAILY
Status: DISCONTINUED | OUTPATIENT
Start: 2022-06-28 | End: 2022-06-29 | Stop reason: HOSPADM

## 2022-06-27 RX ORDER — IPRATROPIUM BROMIDE AND ALBUTEROL SULFATE 2.5; .5 MG/3ML; MG/3ML
3 SOLUTION RESPIRATORY (INHALATION)
Status: DISCONTINUED | OUTPATIENT
Start: 2022-06-27 | End: 2022-06-29 | Stop reason: HOSPADM

## 2022-06-27 RX ORDER — IPRATROPIUM BROMIDE 0.5 MG/2.5ML
0.5 SOLUTION RESPIRATORY (INHALATION)
Status: DISCONTINUED | OUTPATIENT
Start: 2022-06-27 | End: 2022-06-29 | Stop reason: HOSPADM

## 2022-06-27 RX ORDER — MAGNESIUM SULFATE 100 %
4 CRYSTALS MISCELLANEOUS AS NEEDED
Status: DISCONTINUED | OUTPATIENT
Start: 2022-06-27 | End: 2022-06-29 | Stop reason: HOSPADM

## 2022-06-27 RX ORDER — SODIUM CHLORIDE 0.9 % (FLUSH) 0.9 %
5-40 SYRINGE (ML) INJECTION EVERY 8 HOURS
Status: DISCONTINUED | OUTPATIENT
Start: 2022-06-27 | End: 2022-06-29 | Stop reason: HOSPADM

## 2022-06-27 RX ORDER — POLYETHYLENE GLYCOL 3350 17 G/17G
17 POWDER, FOR SOLUTION ORAL DAILY PRN
Status: DISCONTINUED | OUTPATIENT
Start: 2022-06-27 | End: 2022-06-29 | Stop reason: HOSPADM

## 2022-06-27 RX ORDER — TIZANIDINE 2 MG/1
4 TABLET ORAL
Status: DISCONTINUED | OUTPATIENT
Start: 2022-06-27 | End: 2022-06-29 | Stop reason: HOSPADM

## 2022-06-27 RX ORDER — ACETAMINOPHEN 325 MG/1
650 TABLET ORAL
Status: DISCONTINUED | OUTPATIENT
Start: 2022-06-27 | End: 2022-06-29 | Stop reason: HOSPADM

## 2022-06-27 RX ORDER — INSULIN GLARGINE 100 [IU]/ML
15 INJECTION, SOLUTION SUBCUTANEOUS DAILY
Status: DISCONTINUED | OUTPATIENT
Start: 2022-06-28 | End: 2022-06-29 | Stop reason: HOSPADM

## 2022-06-27 RX ADMIN — SODIUM BICARBONATE: 84 INJECTION, SOLUTION INTRAVENOUS at 00:48

## 2022-06-27 RX ADMIN — SODIUM BICARBONATE: 84 INJECTION, SOLUTION INTRAVENOUS at 15:49

## 2022-06-27 RX ADMIN — CARVEDILOL 25 MG: 25 TABLET, FILM COATED ORAL at 18:16

## 2022-06-27 RX ADMIN — SODIUM BICARBONATE: 84 INJECTION, SOLUTION INTRAVENOUS at 23:59

## 2022-06-27 NOTE — PROGRESS NOTES
Report given to oncoming shift. Patient awake and alert, VSS per monitor. AM labs drawn and sent, potassium trending down.

## 2022-06-27 NOTE — ED PROVIDER NOTES
Received patient on signout from Dr. Tata Olmstead. Patient presented with primary complaint of acute renal failure with associated hyperkalemia. Repeat labs this morning demonstrate resolution of hyperkalemia as well as resolution of previously noted mild anion gap metabolic acidosis. Patient is appropriate for telemetry level of care, admission order put in will contact hospitalist to update them on plan.

## 2022-06-27 NOTE — ED NOTES
Pt resting back on hospital bed. Pt placed back on monitor, call bell in reach and bed in lowest position. Sodium bicarb infusing at 100ml per hr to IV site to right hand, pt tolerating well. Pt denies pain and no distress noted. Pt encouraged to call for assistance if needed, verbalized understanding.

## 2022-06-27 NOTE — ED NOTES
Report received from [address] [place] [address], assumed care of this patient at this time.

## 2022-06-27 NOTE — ED NOTES
Personal care items given to patient. Pt amb with steady gait to restroom to freshen up. Pt encouraged to pull emergency cord if needed, verbalized understanding. Pt bedding changed.

## 2022-06-28 ENCOUNTER — APPOINTMENT (OUTPATIENT)
Dept: CT IMAGING | Age: 72
End: 2022-06-28
Attending: INTERNAL MEDICINE
Payer: MEDICARE

## 2022-06-28 LAB
ALBUMIN SERPL-MCNC: 2.9 G/DL (ref 3.4–5)
ALBUMIN SERPL-MCNC: 3 G/DL (ref 3.4–5)
ALBUMIN/GLOB SERPL: 1 {RATIO} (ref 0.8–1.7)
ALP SERPL-CCNC: 112 U/L (ref 45–117)
ALT SERPL-CCNC: 26 U/L (ref 13–56)
ANION GAP SERPL CALC-SCNC: 5 MMOL/L (ref 3–18)
ANION GAP SERPL CALC-SCNC: 6 MMOL/L (ref 3–18)
AST SERPL-CCNC: 17 U/L (ref 10–38)
BASOPHILS # BLD: 0 K/UL (ref 0–0.1)
BASOPHILS NFR BLD: 0 % (ref 0–2)
BILIRUB SERPL-MCNC: 0.5 MG/DL (ref 0.2–1)
BUN SERPL-MCNC: 44 MG/DL (ref 7–18)
BUN SERPL-MCNC: 51 MG/DL (ref 7–18)
BUN/CREAT SERPL: 31 (ref 12–20)
BUN/CREAT SERPL: 32 (ref 12–20)
CALCIUM SERPL-MCNC: 8.2 MG/DL (ref 8.5–10.1)
CALCIUM SERPL-MCNC: 8.6 MG/DL (ref 8.5–10.1)
CHLORIDE SERPL-SCNC: 105 MMOL/L (ref 100–111)
CHLORIDE SERPL-SCNC: 107 MMOL/L (ref 100–111)
CO2 SERPL-SCNC: 27 MMOL/L (ref 21–32)
CO2 SERPL-SCNC: 29 MMOL/L (ref 21–32)
CREAT SERPL-MCNC: 1.36 MG/DL (ref 0.6–1.3)
CREAT SERPL-MCNC: 1.63 MG/DL (ref 0.6–1.3)
DIFFERENTIAL METHOD BLD: ABNORMAL
EOSINOPHIL # BLD: 0 K/UL (ref 0–0.4)
EOSINOPHIL NFR BLD: 0 % (ref 0–5)
ERYTHROCYTE [DISTWIDTH] IN BLOOD BY AUTOMATED COUNT: 15.2 % (ref 11.6–14.5)
GLOBULIN SER CALC-MCNC: 3 G/DL (ref 2–4)
GLUCOSE BLD STRIP.AUTO-MCNC: 113 MG/DL (ref 70–110)
GLUCOSE BLD STRIP.AUTO-MCNC: 115 MG/DL (ref 70–110)
GLUCOSE BLD STRIP.AUTO-MCNC: 144 MG/DL (ref 70–110)
GLUCOSE BLD STRIP.AUTO-MCNC: 154 MG/DL (ref 70–110)
GLUCOSE SERPL-MCNC: 120 MG/DL (ref 74–99)
GLUCOSE SERPL-MCNC: 123 MG/DL (ref 74–99)
HCT VFR BLD AUTO: 33.7 % (ref 35–45)
HGB BLD-MCNC: 10.6 G/DL (ref 12–16)
IMM GRANULOCYTES # BLD AUTO: 0 K/UL (ref 0–0.04)
IMM GRANULOCYTES NFR BLD AUTO: 0 % (ref 0–0.5)
INR PPP: 1 (ref 0.8–1.2)
LYMPHOCYTES # BLD: 1.5 K/UL (ref 0.9–3.6)
LYMPHOCYTES NFR BLD: 16 % (ref 21–52)
MCH RBC QN AUTO: 27 PG (ref 24–34)
MCHC RBC AUTO-ENTMCNC: 31.5 G/DL (ref 31–37)
MCV RBC AUTO: 85.8 FL (ref 78–100)
MONOCYTES # BLD: 1 K/UL (ref 0.05–1.2)
MONOCYTES NFR BLD: 11 % (ref 3–10)
NEUTS SEG # BLD: 6.9 K/UL (ref 1.8–8)
NEUTS SEG NFR BLD: 73 % (ref 40–73)
NRBC # BLD: 0.08 K/UL (ref 0–0.01)
NRBC BLD-RTO: 0.8 PER 100 WBC
PHOSPHATE SERPL-MCNC: 2.9 MG/DL (ref 2.5–4.9)
PHOSPHATE SERPL-MCNC: 3.3 MG/DL (ref 2.5–4.9)
PLATELET # BLD AUTO: 264 K/UL (ref 135–420)
PMV BLD AUTO: 10.5 FL (ref 9.2–11.8)
POTASSIUM SERPL-SCNC: 4.3 MMOL/L (ref 3.5–5.5)
POTASSIUM SERPL-SCNC: 4.5 MMOL/L (ref 3.5–5.5)
PROT SERPL-MCNC: 5.9 G/DL (ref 6.4–8.2)
PROTHROMBIN TIME: 13.9 SEC (ref 11.5–15.2)
RBC # BLD AUTO: 3.93 M/UL (ref 4.2–5.3)
SODIUM SERPL-SCNC: 139 MMOL/L (ref 136–145)
SODIUM SERPL-SCNC: 140 MMOL/L (ref 136–145)
WBC # BLD AUTO: 9.5 K/UL (ref 4.6–13.2)

## 2022-06-28 PROCEDURE — 74011250637 HC RX REV CODE- 250/637: Performed by: STUDENT IN AN ORGANIZED HEALTH CARE EDUCATION/TRAINING PROGRAM

## 2022-06-28 PROCEDURE — 2709999900 HC NON-CHARGEABLE SUPPLY

## 2022-06-28 PROCEDURE — 96375 TX/PRO/DX INJ NEW DRUG ADDON: CPT

## 2022-06-28 PROCEDURE — 99222 1ST HOSP IP/OBS MODERATE 55: CPT | Performed by: INTERNAL MEDICINE

## 2022-06-28 PROCEDURE — 80069 RENAL FUNCTION PANEL: CPT

## 2022-06-28 PROCEDURE — 97166 OT EVAL MOD COMPLEX 45 MIN: CPT

## 2022-06-28 PROCEDURE — 94761 N-INVAS EAR/PLS OXIMETRY MLT: CPT

## 2022-06-28 PROCEDURE — 74011000250 HC RX REV CODE- 250: Performed by: STUDENT IN AN ORGANIZED HEALTH CARE EDUCATION/TRAINING PROGRAM

## 2022-06-28 PROCEDURE — 74011250637 HC RX REV CODE- 250/637: Performed by: INTERNAL MEDICINE

## 2022-06-28 PROCEDURE — 74011250636 HC RX REV CODE- 250/636: Performed by: INTERNAL MEDICINE

## 2022-06-28 PROCEDURE — 82962 GLUCOSE BLOOD TEST: CPT

## 2022-06-28 PROCEDURE — 87086 URINE CULTURE/COLONY COUNT: CPT

## 2022-06-28 PROCEDURE — 74011636637 HC RX REV CODE- 636/637: Performed by: INTERNAL MEDICINE

## 2022-06-28 PROCEDURE — 97535 SELF CARE MNGMENT TRAINING: CPT

## 2022-06-28 PROCEDURE — 74176 CT ABD & PELVIS W/O CONTRAST: CPT

## 2022-06-28 PROCEDURE — 94640 AIRWAY INHALATION TREATMENT: CPT

## 2022-06-28 PROCEDURE — 74011000250 HC RX REV CODE- 250: Performed by: INTERNAL MEDICINE

## 2022-06-28 PROCEDURE — 65270000029 HC RM PRIVATE

## 2022-06-28 PROCEDURE — G0378 HOSPITAL OBSERVATION PER HR: HCPCS

## 2022-06-28 PROCEDURE — 74011636637 HC RX REV CODE- 636/637: Performed by: STUDENT IN AN ORGANIZED HEALTH CARE EDUCATION/TRAINING PROGRAM

## 2022-06-28 PROCEDURE — 97162 PT EVAL MOD COMPLEX 30 MIN: CPT

## 2022-06-28 RX ORDER — LANOLIN ALCOHOL/MO/W.PET/CERES
3 CREAM (GRAM) TOPICAL
Status: DISCONTINUED | OUTPATIENT
Start: 2022-06-28 | End: 2022-06-29 | Stop reason: HOSPADM

## 2022-06-28 RX ADMIN — Medication 3 MG: at 22:43

## 2022-06-28 RX ADMIN — ATORVASTATIN CALCIUM 80 MG: 40 TABLET, FILM COATED ORAL at 09:21

## 2022-06-28 RX ADMIN — CARVEDILOL 25 MG: 25 TABLET, FILM COATED ORAL at 09:21

## 2022-06-28 RX ADMIN — CARVEDILOL 25 MG: 25 TABLET, FILM COATED ORAL at 17:22

## 2022-06-28 RX ADMIN — ACETAMINOPHEN 650 MG: 325 TABLET ORAL at 09:22

## 2022-06-28 RX ADMIN — HYDRALAZINE HYDROCHLORIDE 10 MG: 20 INJECTION, SOLUTION INTRAMUSCULAR; INTRAVENOUS at 05:19

## 2022-06-28 RX ADMIN — Medication 2 UNITS: at 12:21

## 2022-06-28 RX ADMIN — SODIUM CHLORIDE, PRESERVATIVE FREE 10 ML: 5 INJECTION INTRAVENOUS at 06:14

## 2022-06-28 RX ADMIN — SODIUM CHLORIDE, PRESERVATIVE FREE 10 ML: 5 INJECTION INTRAVENOUS at 00:12

## 2022-06-28 RX ADMIN — SODIUM CHLORIDE, PRESERVATIVE FREE 10 ML: 5 INJECTION INTRAVENOUS at 22:48

## 2022-06-28 RX ADMIN — SODIUM CHLORIDE, PRESERVATIVE FREE 10 ML: 5 INJECTION INTRAVENOUS at 17:23

## 2022-06-28 RX ADMIN — ARFORMOTEROL TARTRATE 15 MCG: 15 SOLUTION RESPIRATORY (INHALATION) at 20:20

## 2022-06-28 RX ADMIN — ASPIRIN 81 MG CHEWABLE TABLET 162 MG: 81 TABLET CHEWABLE at 09:26

## 2022-06-28 RX ADMIN — Medication 15 UNITS: at 09:19

## 2022-06-28 RX ADMIN — ARFORMOTEROL TARTRATE 15 MCG: 15 SOLUTION RESPIRATORY (INHALATION) at 07:53

## 2022-06-28 RX ADMIN — EZETIMIBE 10 MG: 10 TABLET ORAL at 09:22

## 2022-06-28 NOTE — PROGRESS NOTES
Reason for Admission:  Acute hyperkalemia [E87.5]  Breast cancer ([facility] [C50.919]  Rectal cancer ([facility] [C20]  Acute renal failure (ARF) ([facility] [N17.9]                 RUR Score:  16%           Plan for utilizing home health:   TBD                    Likelihood of Readmission:   Moderate                         Do you (patient/family) have any concerns for transition/discharge?  no    Transition of Care Plan:       Initial assessment completed with patient. Cognitive status of patient: oriented to time, place, person and situation. Face sheet information confirmed:  yes. The patient designates Dionicio Buenrostro, spouse ([phone]) to participate in her discharge plan and to receive any needed information. This patient lives in a single family home with spouse. Patient was able to navigate steps as needed. Prior to hospitalization, patient was considered to be independent with ADLs/IADLS : yes . Patient has a current ACP document on file: no      The patient's spouse will be available to transport patient home upon discharge. The patient already has [place] [address], Rollator, Shower chair, and BSC available in the home. Patient is not currently active with home health. .  Patient has not stayed in a skilled nursing facility or rehab. This patient is on dialysis :no       Freedom of choice signed: no.    Currently, the discharge plan is Home. CM will continue to monitor and assist with transition of care. The patient states that she can obtain her medications from the pharmacy, and take her medications as directed.     Patient's current insurance is [employer] and [place] complete      Care Management Interventions  Mode of Transport at Discharge: Self  Transition of Care Consult (CM Consult): Discharge Planning  Discharge Durable Medical Equipment: No  Physical Therapy Consult: Yes  Occupational Therapy Consult: Yes  Speech Therapy Consult: No  Support Systems: Spouse/Significant Other  Confirm Follow Up Transport: Self  Discharge Location  Patient Expects to be Discharged to[de-identified] Home      LUZ MARIA EricksonN, RN  Pager # [phone]  Care Manager

## 2022-06-28 NOTE — H&P
History and Physical    Patient: Nelida Nichols MRN: [id number]  SSN: [id number]    YOB: 1950  Age: 81 y.o. Sex: female      Subjective:      Nelida Nichols is a 81 y.o. female who presents to [facility] ER with complaint of Referred to [facility] ER by [facility]. Patient reports that she has been having some faintness when sweeping her floor and dyspnea on exertion (Patient reports that she cannot walk up 2 flights of stairs without stopping to catch her breath). Patient also reports nocturia (4 times/night), chills (which she attributes to anemia), chronic intermittent diarrhea (2-3 times/week), and a UTI in March. Patient states that she was previously on [address] (a.k.a. Entresto), but had discontinue this agent, as it was damaging her kidneys. Patient denies fevers, chills, nausea, vomiting, dysuria, cough, and abdominal pain. Notably, Patient is on Spironolactone at home. Patient is noted to have arrived at [facility] ER as early as 2155 EST on 6/25/2022. Patient was noted to have K+ 7.1 at its highest at [facility] ER and a VBG showing pH 7.17. Creatinine was 2.25 initially and has improved to 1.55 on last measurement prior to arrival at [facility] Telemetry Unit. Patient was noted to have received at [facility] ER the following medications:  Carvedilol 25 mg, Sodium Bicarbonate (8.4% 100 mL/hr), Sodium polystyrene 45 grams in 2 doses, NS 1 L, IV Furosemide 40 mg, Dextrose 10% in H2O 250 mL x2, IV Calcium Gluconate 1 gram, and Short-Acting Insulin 20 Units. Patient is admitted to [facility] Telemetry Unit for management of Hyperkalemia, Acute Kidney Injury Stage 1 (possibly Acute Tubular Necrosis), and Metabolic Acidosis.     Past Medical History:   Diagnosis Date    Abnormal WBC count 5/17/2016    Anemia     Bilateral shoulder pain 9/27/2016    Chondromalacia of both patellae     COPD (chronic obstructive pulmonary disease) ([facility] 9/2015    mild-mod dz; Dr Brent Mckeon    Diabetes [facility]) 2013    Diabetic eye exam ([facility] 2016    [facility]     Dyslipidemia     Gout     Heart attack ([facility]     Heart attack ([facility]     Heart attack ([facility] 10/18/2019    History of echocardiogram 2014    Mild LVE. EF 40%. Mild, diffuse hypk. Mild LAE. Mild MR.      Hypercholesteremia 14    Hypertension 2000    Noncompliance with medications 2016    Obesity     Orthostatic hypotension 2016    Osteoarthritis of both knees     Pain management 2016    Dr. Chidi Rocha Popliteal cyst, bilateral      Vitamin D deficiency 10/16/14     Past Surgical History:   Procedure Laterality Date    HX HEART CATHETERIZATION      HX TUBAL LIGATION        Family History   Problem Relation Age of Onset    Diabetes Mother     Hypertension Mother     Hypertension Father     Kidney Disease Maternal Aunt 48        Dialysis     Social History     Tobacco Use    Smoking status: Former Smoker     Packs/day: 0.25     Years: 48.00     Pack years: 12.00     Types: Cigarettes     Quit date: 2018     Years since quittin.1    Smokeless tobacco: Never Used   Substance Use Topics    Alcohol use: No     Alcohol/week: 0.0 standard drinks      Prior to Admission medications    Medication Sig Start Date End Date Taking? Authorizing Provider   gabapentin (NEURONTIN) 400 mg capsule Take 400 mg by mouth daily. Yes Morales, MD Tg   bumetanide (BUMEX) 2 mg tablet Take 0.5 Tablets by mouth daily. 21  Yes Raciel Russell DO   calcitRIOL (ROCALTROL) 0.25 mcg capsule Take 1 Cap by mouth daily. 3/24/21  Yes Garrett Hashimoto, MD   cyanocobalamin 1,000 mcg tablet Take 1 Tab by mouth daily. 3/24/21  Yes Garrett Hashimoto, MD   albuterol-ipratropium (DUO-NEB) 2.5 mg-0.5 mg/3 ml nebu 3 mL by Nebulization route every six (6) hours as needed for Wheezing. 3/23/21  Yes Garrett Hashimoto, MD   atorvastatin (LIPITOR) 80 mg tablet Take 1 Tab by mouth daily. 1/25/21  Yes Omi Pereira MD   carvediloL (COREG) 25 mg tablet Take 1 Tab by mouth two (2) times daily (with meals). 1/25/21  Yes Omi Pereira MD   albuterol (PROVENTIL HFA, VENTOLIN HFA, PROAIR HFA) 90 mcg/actuation inhaler Take 2 Puffs by inhalation every four (4) hours as needed for Wheezing. 9/30/19  Yes Ana Espinosa MD   aspirin 81 mg chewable tablet Take 2 Tabs by mouth daily. 7/19/19  Yes Kendra Rabago NP   insulin detemir (LEVEMIR) 100 unit/mL injection 15 units daily for diabetes 7/20/17  Yes Iqra Irizarry [facility]   desloratadine (CLARINEX) 5 mg tablet Take 1 Tab by mouth daily as needed for Allergies. 4/26/17  Yes Iqra Irizarry DNP   mometasone (NASONEX) 50 mcg/actuation nasal spray 2 Sprays by Both Nostrils route daily as needed. For allergies 4/26/17  Yes [facility] [place], [place]   umeclidinium-vilanterol (ANORO ELLIPTA) 62.5-25 mcg/actuation inhaler Take 1 Puff by inhalation daily. For COPD 4/26/17  Yes [place], [place]   tamsulosin [facility]) 0.4 mg capsule Take 1 Capsule by mouth daily. Patient not taking: Reported on 6/27/2022 3/12/22   Joce Simpson MD        No Known Allergies    Review of Systems:  (-) Fevers  (+) Chills  (-) Cough  (-) Increased Sputum Production  (-) Shortness of Breath  (+) Dyspnea on Exertion  (-) Chest Pain  (-) Abdominal Pain  (-) Nausea  (-) Vomiting  (+) Diarrhea  (-) Dysuria  (+) Nocturia  (+) Faintness  All other systems have been reviewed and are negative      Objective:     Vitals:    06/27/22 1945 06/27/22 2000 06/27/22 2030 06/27/22 2202   BP: 110/60 110/60 (!) 140/73 (!) 161/71   Pulse: 72 88 71 71   Resp: 17 19 17 18   Temp:  98.8 °F (37.1 °C)  98.4 °F (36.9 °C)   SpO2:  94% 96% 95%   Weight:       Height:            Physical Exam:  General:  Adult female lying in bed in no acute distress  HEENT:  Atraumatic, normocephalic; Pupils equally round and reactive to light with accommodation; Extraocular muscles intact;  Moist Oropharynx without erythema, edema, or exudates  Chest:  No pectus carinatum; No pectus excavatum  Cardiovascular:  Regular rate and rhythm without rubs, gallops, or murmurs  Respiratory:  Clear to Auscultation Bilaterally without wheezes, rales, or rhonchi; normal effort of breathing  Abdominal:  Soft, non-tense, (+) Mild to Moderate LUQ Tenderness; BS present without guarding, rebound, or masses; (+) Moderate Pain over Left CVA to Percussion, (+) Mild Pain over Right CVA to Percussion  :  Deferred  Extremities:  Pulses 2+ x4 without edema, clubbing, or cyanosis  Musculoskeletal:  Strength 5/5 and symmetrical in BUE and BLE  Integument:  No rash on face, forearms, or legs  Neurological:  A&O x4/4; No gross deficits of Visual Acuity, Eye Movement, Jaw Opening, Facial Expression, Hearing, Phonation, or Head Movement;  No gross deficits of Tongue Movement or Slurring of Speech  Psychiatric:  Affect is appropriate; Language is present and fluent; Behavior is appropriate      Laboratory Studies:  CMP:   Lab Results   Component Value Date/Time     06/27/2022 06:15 AM    K 4.6 06/27/2022 06:15 AM     06/27/2022 06:15 AM    CO2 26 06/27/2022 06:15 AM    AGAP 7 06/27/2022 06:15 AM     (H) 06/27/2022 06:15 AM    BUN 50 (H) 06/27/2022 06:15 AM    CREA 1.55 (H) 06/27/2022 06:15 AM    GFRAA 40 (L) 06/27/2022 06:15 AM    GFRNA 33 (L) 06/27/2022 06:15 AM    CA 8.5 06/27/2022 06:15 AM    PHOS 3.5 06/27/2022 06:15 AM    ALB 3.0 (L) 06/27/2022 06:15 AM     CBC: No results found for: WBC, HGB, HGBEXT, HCT, HCTEXT, PLT, PLTEXT, HGBEXT, HCTEXT, PLTEXT  All Cardiac Markers in the last 24 hours: No results found for: CPK, CK, CKMMB, CKMB, RCK3, CKMBT, CKNDX, CKND1, GODFREY, TROPT, TROIQ, SANG, TROPT, TNIPOC, BNP, BNPP  Recent Glucose Results:   Lab Results   Component Value Date/Time     (H) 06/27/2022 06:15 AM     ABG: No results found for: PH, PHI, PCO2, PCO2I, PO2, PO2I, HCO3, HCO3I, FIO2, FIO2I  COAGS: No results found for: APTT, PTP, INR, INREXT, INREXT     Images Reviewed:  No results found.       Assessment:     Hospital Problems  Date Reviewed: 4/26/2021          Codes Class Noted POA    * (Principal) Acute hyperkalemia ICD-10-CM: E87.5  ICD-9-CM: 276.7  6/26/2022 Unknown        Stage 1 acute kidney injury ([facility] ICD-10-CM: N17.9  ICD-9-CM: 584.9  12/18/2021 Unknown        Chronic respiratory failure with hypoxia (HCC) (Chronic) ICD-10-CM: J96.11  ICD-9-CM: 518.83, 799.02  6/27/2022 Yes        History of myocardial infarction (Chronic) ICD-10-CM: I25.2  ICD-9-CM: 579  6/27/2022 Yes        Breast cancer (HCC) (Chronic) ICD-10-CM: C50.919  ICD-9-CM: 174.9  6/26/2022 Unknown        Rectal cancer (HCC) (Chronic) ICD-10-CM: C20  ICD-9-CM: 154.1  6/26/2022 Unknown        CAD (coronary artery disease) (Chronic) ICD-10-CM: I25.10  ICD-9-CM: 414.00  10/18/2019 Yes    Overview Signed 10/24/2019 10:43 AM by Batsheva Edward     Added automatically from request for surgery [id number]             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) ICD-10-CM: I50.40  ICD-9-CM: 428.40  8/26/2019 Yes        Type 2 diabetes with nephropathy (HCC) (Chronic) ICD-10-CM: E11.21  ICD-9-CM: 250.40, 583.81  7/12/2018 Yes        Chronic pain syndrome (Chronic) ICD-10-CM: G89.4  ICD-9-CM: 338.4  4/13/2016 Yes        Dyslipidemia, goal LDL below 70 (Chronic) ICD-10-CM: E78.5  ICD-9-CM: 272.4  10/22/2015 Yes        Former smoker (Chronic) ICD-10-CM: W23.773  ICD-9-CM: V15.82  10/22/2015 Yes        Essential hypertension (Chronic) ICD-10-CM: I10  ICD-9-CM: 401.9  9/17/2015 Yes        COPD (chronic obstructive pulmonary disease) ([facility] (Chronic) ICD-10-CM: J44.9  ICD-9-CM: 430  9/1/2015 Yes    Overview Signed 9/11/2015  5:45 PM by Meri Hernandez NP     mild-mod dz; Dr Dionne Sumner             Diabetes mellitus type 2, insulin dependent ([facility] (Chronic) ICD-10-CM: E11.9, Z79.4  ICD-9-CM: 250.00, V58.67  7/2/2015 Yes Cardiomyopathy, dilated ([facility] (Chronic) ICD-10-CM: I42.0  ICD-9-CM: 425.4  12/31/2014 Yes              Plan:     Hyperkalemia 2°/2 Acute Kidney Injury Stage 1 (with possible Acute Tubular Necrosis) with Metabolic Acidosis  K+ 7.1 improved to K+ 4.7. Creatinine 2.25 (Baseline 1.8?) improved to 1.55. Initial pH 7.17. Telemetry, IV Sodium Bicarbonate, VBG qday, and BMP qday. Consult Nephrology [facility]). HOLD Nephrotoxic agents. Ordered CT Abdomen/Pelvis w/o Contrast to evaluate for possible obstruction. Chronic Systolic CHF  Strict I/Os and Daily Weight. HOLD home Bumetanide 1 mg. COPD   Continue substitute home Breathing Treatments and PRN Duonebs. CAD  Continue home Aspirin. HTN  HOLD home Chlorthalidone. HLD  Continue home Atorvastatin. Diabetes Mellitus Type 2  POC Glucose checks qACHS with SSI coverage. Long-Acting Insulin 15 Units qday. DVT Prophylaxis  DVT chemoprophylaxis is achieved with subcutaneous Enoxaparin 40 mg qday.     Signed By: Zahira Padgett, DO     June 27, 2022

## 2022-06-28 NOTE — CONSULTS
Consult Note  Consult requested by: dr Rafiq Spann is a 81 y.o. female BLACK/ who is being seen on consult for renal failure  Chief Complaint   Patient presents with    Abnormal Lab Results     Admission diagnosis: Acute hyperkalemia     HPI: 81 y o  [place] female presented to [facility] er with worsening renal failure,acidosis ,hyperkalemia,spent 2 days in er,received iv bicarb drip. hx of crf,cardiomyopathy,was on entresto ? Aldactone ? Nsaids. pt has no complaints today  Past Medical History:   Diagnosis Date    Abnormal WBC count 2016    Anemia     Bilateral shoulder pain 2016    Chondromalacia of both patellae     COPD (chronic obstructive pulmonary disease) ([facility] [address] 2015    mild-mod dz; Dr Onel Herrera    Diabetes [facility])     Diabetic eye exam ([facility] [address]     Dilated cardiomyopathy ([facility] [address]     Dyslipidemia     Gout     Heart attack ([facility] [address] 10/18/2019    History of echocardiogram 2014    Mild LVE. EF 40%. Mild, diffuse hypk. Mild LAE.   Mild MR.      Hypercholesteremia 14    Hypertension 2000    Noncompliance with medications 2016    Obesity     Orthostatic hypotension 2016    Osteoarthritis of both knees     Pain management 2016    Dr. Khoa El Popliteal cyst, bilateral      Vitamin D deficiency 10/16/14      Past Surgical History:   Procedure Laterality Date    HX HEART CATHETERIZATION      HX TUBAL LIGATION         Social History     Socioeconomic History    Marital status:      Spouse name: Not on file    Number of children: 3    Years of education: Not on file    Highest education level: Not on file   Occupational History    Occupation: retired   Tobacco Use    Smoking status: Former Smoker     Packs/day: 0.25     Years: 48.00     Pack years: 12.00     Types: Cigarettes     Quit date: 2018     Years since quittin.1    Smokeless tobacco: Never Used   Substance and Sexual Activity    Alcohol use: No     Alcohol/week: 0.0 standard drinks    Drug use: No    Sexual activity: Yes     Partners: Male   Other Topics Concern     Service No    Blood Transfusions No    Caffeine Concern No    Occupational Exposure No    Hobby Hazards No    Sleep Concern No    Stress Concern No    Weight Concern No    Special Diet No    Back Care No    Exercise No    Bike Helmet No    Seat Belt Yes    Self-Exams Yes   Social History Narrative    Not on file     Social Determinants of Health     Financial Resource Strain:     Difficulty of Paying Living Expenses: Not on file   Food Insecurity:     Worried About Running Out of Food in the Last Year: Not on file    Ania of Food in the Last Year: Not on file   Transportation Needs:     Lack of Transportation (Medical): Not on file    Lack of Transportation (Non-Medical):  Not on file   Physical Activity:     Days of Exercise per Week: Not on file    Minutes of Exercise per Session: Not on file   Stress:     Feeling of Stress : Not on file   Social Connections:     Frequency of Communication with Friends and Family: Not on file    Frequency of Social Gatherings with Friends and Family: Not on file    Attends Holiness Services: Not on file    Active Member of [address] [place] [address] [employer] or Organizations: Not on file    Attends Club or Organization Meetings: Not on file    Marital Status: Not on file   Intimate Partner Violence:     Fear of Current or Ex-Partner: Not on file    Emotionally Abused: Not on file    Physically Abused: Not on file    Sexually Abused: Not on file   Housing Stability:     Unable to Pay for Housing in the Last Year: Not on file    Number of [place] in the Last Year: Not on file    Unstable Housing in the Last Year: Not on file       Family History   Problem Relation Age of Onset    Diabetes Mother     Hypertension Mother     Hypertension Father     Kidney Disease Maternal Aunt 50        Dialysis     No Known Allergies     Home Medications:     Prior to Admission Medications   Prescriptions Last Dose Informant Patient Reported? Taking? albuterol (PROVENTIL HFA, VENTOLIN HFA, PROAIR HFA) 90 mcg/actuation inhaler   No Yes   Sig: Take 2 Puffs by inhalation every four (4) hours as needed for Wheezing. albuterol-ipratropium (DUO-NEB) 2.5 mg-0.5 mg/3 ml nebu   No Yes   Sig: 3 mL by Nebulization route every six (6) hours as needed for Wheezing. aspirin 81 mg chewable tablet   No Yes   Sig: Take 2 Tabs by mouth daily. atorvastatin (LIPITOR) 80 mg tablet   No Yes   Sig: Take 1 Tab by mouth daily. bumetanide (BUMEX) 2 mg tablet   No Yes   Sig: Take 0.5 Tablets by mouth daily. calcitRIOL (ROCALTROL) 0.25 mcg capsule   No Yes   Sig: Take 1 Cap by mouth daily. carvediloL (COREG) 25 mg tablet   No Yes   Sig: Take 1 Tab by mouth two (2) times daily (with meals). chlorthalidone (HYGROTON) 25 mg tablet   Yes Yes   Sig: Take 25 mg by mouth daily. desloratadine (CLARINEX) 5 mg tablet   No Yes   Sig: Take 1 Tab by mouth daily as needed for Allergies. ezetimibe (ZETIA) 10 mg tablet   Yes Yes   Sig: Take 10 mg by mouth daily. gabapentin (NEURONTIN) 400 mg capsule   Yes Yes   Sig: Take 400 mg by mouth daily. insulin detemir (LEVEMIR) 100 unit/mL injection   No Yes   Sig: 15 units daily for diabetes   mometasone (NASONEX) 50 mcg/actuation nasal spray   No Yes   Si Sprays by Both Nostrils route daily as needed. For allergies   spironolactone (ALDACTONE) 25 mg tablet   Yes Yes   Sig: Take 25 mg by mouth daily. sulindac (CLINORIL) 200 mg tablet   Yes Yes   Sig: Take 200 mg by mouth two (2) times a day. tiZANidine (ZANAFLEX) 4 mg tablet   Yes Yes   Sig: Take 4 mg by mouth two (2) times daily as needed for Muscle Spasm(s). umeclidinium-vilanterol (ANORO ELLIPTA) 62.5-25 mcg/actuation inhaler   No Yes   Sig: Take 1 Puff by inhalation daily.  For COPD      Facility-Administered Medications: None       Current Facility-Administered Medications   Medication Dose Route Frequency    albuterol-ipratropium (DUO-NEB) 2.5 MG-0.5 MG/3 ML  3 mL Nebulization Q6H PRN    aspirin chewable tablet 162 mg  162 mg Oral DAILY    atorvastatin (LIPITOR) tablet 80 mg  80 mg Oral DAILY    carvediloL (COREG) tablet 25 mg  25 mg Oral BID WITH MEALS    insulin glargine (LANTUS) injection 15 Units  15 Units SubCUTAneous DAILY    ipratropium (ATROVENT) 0.02 % nebulizer solution 0.5 mg  0.5 mg Nebulization Q8H PRN    And    arformoteroL (BROVANA) neb solution 15 mcg  15 mcg Nebulization BID RT    sodium chloride (NS) flush 5-40 mL  5-40 mL IntraVENous Q8H    sodium chloride (NS) flush 5-40 mL  5-40 mL IntraVENous PRN    acetaminophen (TYLENOL) tablet 650 mg  650 mg Oral Q6H PRN    Or    acetaminophen (TYLENOL) suppository 650 mg  650 mg Rectal Q6H PRN    polyethylene glycol (MIRALAX) packet 17 g  17 g Oral DAILY PRN    ondansetron (ZOFRAN ODT) tablet 4 mg  4 mg Oral Q8H PRN    Or    ondansetron (ZOFRAN) injection 4 mg  4 mg IntraVENous Q6H PRN    enoxaparin (LOVENOX) injection 40 mg  40 mg SubCUTAneous DAILY    ezetimibe (ZETIA) tablet 10 mg  10 mg Oral DAILY    tiZANidine (ZANAFLEX) tablet 4 mg  4 mg Oral BID PRN    insulin lispro (HUMALOG) injection   SubCUTAneous AC&HS    glucose chewable tablet 16 g  4 Tablet Oral PRN    glucagon (GLUCAGEN) injection 1 mg  1 mg IntraMUSCular PRN    dextrose 10% infusion 0-250 mL  0-250 mL IntraVENous PRN    melatonin tablet 5 mg  5 mg Oral QHS PRN    nitroglycerin (NITROBID) 2 % ointment 1 Inch  1 Inch Topical Q6H PRN    hydrALAZINE (APRESOLINE) 20 mg/mL injection 10 mg  10 mg IntraVENous Q6H PRN    dextrose 10% infusion 0-250 mL  0-250 mL IntraVENous PRN    dextrose 10% infusion 0-250 mL  0-250 mL IntraVENous PRN       Review of Systems:   A comprehensive review of systems was negative except for that written in the HPI.   Data Review:    Labs: Results:       Chemistry Recent Labs     06/28/22  6967 06/27/22 2345 06/27/22  0615 06/25/22  2330 06/25/22  2210   * 123* 118*   < > 259*    139 141   < > 136   K 4.5 4.3 4.6   < > 6.7*    107 108   < > 113*   CO2 29 27 26   < > 17*   BUN 44* 51* 50*   < > 63*   CREA 1.36* 1.63* 1.55*   < > 2.25*   CA 8.6 8.2* 8.5   < > 10.0   AGAP 6 5 7   < > 6   BUCR 32* 31* 32*   < > 28*   AP  --  112  --   --  136*   TP  --  5.9*  --   --  6.8   ALB 3.0* 2.9* 3.0*   < > 3.5   GLOB  --  3.0  --   --  3.3   AGRAT  --  1.0  --   --  1.1    < > = values in this interval not displayed. PTH  Lab Results   Component Value Date/Time    Calcium 8.6 06/28/2022 06:51 AM    Phosphorus 2.9 06/28/2022 06:51 AM    PTH, Intact 135.1 (H) 03/09/2022 02:27 AM      CBC w/Diff Recent Labs     06/27/22 2345 06/25/22  2210   WBC 9.5 9.9   RBC 3.93* 4.09*   HGB 10.6* 11.4*   HCT 33.7* 37.0    333   GRANS 73 87*   LYMPH 16* 6*   EOS 0 0      Coagulation Recent Labs     06/27/22 2345   PTP 13.9   INR 1.0       Iron/Ferritin No results for input(s): IRON in the last 72 hours. No lab exists for component: TIBCCALC   BNP No results for input(s): BNPP in the last 72 hours. Cardiac Enzymes No results for input(s): CPK, CKND1, GODFREY in the last 72 hours. No lab exists for component: CKRMB, TROIP   Liver Enzymes Recent Labs     06/28/22  0651 06/27/22 2345 06/27/22 2345   TP  --   --  5.9*   ALB 3.0*   < > 2.9*   AP  --   --  112    < > = values in this interval not displayed.       Thyroid Studies Lab Results   Component Value Date/Time    TSH 0.21 (L) 03/19/2021 04:00 AM        Urinalysis Lab Results   Component Value Date/Time    Color YELLOW 06/26/2022 06:47 AM    Appearance CLEAR 06/26/2022 06:47 AM    Specific gravity 1.015 06/26/2022 06:47 AM    pH (UA) 5.0 06/26/2022 06:47 AM    Protein 30 (A) 06/26/2022 06:47 AM    Glucose Negative 06/26/2022 06:47 AM    Ketone Negative 06/26/2022 06:47 AM    Bilirubin Negative 06/26/2022 06:47 AM    Urobilinogen 0.2 06/26/2022 06:47 AM    Nitrites Negative 06/26/2022 06:47 AM    Leukocyte Esterase Negative 06/26/2022 06:47 AM    Epithelial cells 2+ 06/26/2022 06:47 AM    Bacteria 1+ (A) 06/26/2022 06:47 AM    WBC 0 to 3 06/26/2022 06:47 AM    RBC Negative 06/26/2022 06:47 AM         IMAGES:   XR Results (maximum last 3): Results from [place] encounter on 06/25/22    XR CHEST PA LAT    Narrative  Chest PA and lateral    INDICATION: Shortness of breath    COMPARISON: 3/22    FINDINGS:  Two views of the chest were obtained. Arthrosclerosis. The lungs are clear. Slight medial cardiophrenic angle prominence, similar to multiple prior studies  Cardiac silhouette is normal. Pulmonary vasculature is unremarkable. Osseous  structures are intact. Impression  No acute cardiopulmonary disease. No significant interval change. Results from [place] encounter on 03/07/22    XR CHEST PORT    Narrative  EXAM: Chest Radiograph    INDICATION:  Dizzy    TECHNIQUE: AP view of the chest    COMPARISON: 3/18/2021, 10/22/2020, 10/27/2019    FINDINGS: No pneumothorax identified. The lungs are clear. No infiltrates  appreciated. No effusions identified. The cardiomediastinal silhouette is  unremarkable. The pulmonary vasculature is unremarkable. Scoliosis of the  thoracic spine. Impression  1. No acute infiltrate or effusion. Results from [place] encounter on 03/17/21    XR CHEST PORT    Narrative  Portable Chest    CPT CODE: 85377    HISTORY: Line placement. FINDINGS:    Comparison 10/22/2020    Single frontal view of the chest. Lordotic positioning. Left internal jugular  line tip at the proximal to mid SVC. Multiple wires overlie the patient. No  pneumothorax. Heart size and mediastinal contours within normal limits. No  effusion or focal lung consolidation. Impression  No pneumothorax after left CVL placement. Tip in the region of the proximal to  mid SVC. CT Results (maximum last 3):   Results from Hospital Encounter encounter on 06/25/22    CT ABD PELV WO CONT    Narrative  CT ABDOMEN AND PELVIS WITHOUT ENHANCEMENT    INDICATION: Syncope, dyspnea on exertion, chills, chronic intermittent diarrhea,  UTI, elevated potassium, creatinine, CVA tenderness, left greater than right. TECHNIQUE: Axial images obtained of the abdomen and pelvis without intravenous  contrast. Coronal and sagittal reformatted images of the abdomen and pelvis were  obtained. All CT scans at this facility are performed using dose optimization technique as  appropriate to a performed exam, to include automated exposure control,  adjustment of the mA and/or kV according to patient size (including appropriate  matching first site-specific examinations), or use of iterative reconstruction  technique. COMPARISON: 3/7/2022. Lack of intravenous contrast renders this study suboptimal for evaluating the  solid abdominal organs, vasculature and bowel. ABDOMEN FINDINGS:    Liver: Unremarkable. Spleen: Unremarkable. Pancreas: Unremarkable. Biliary: Unremarkable. Bowel: Diverticulosis. Bowel is normal in caliber. Normal appendix. No bowel  wall thickening or pneumatosis. Peritoneum/ Retroperitoneum: Unremarkable. Lymph Nodes: Unremarkable. Adrenal Glands: Unremarkable. Kidneys: Nonspecific perinephric stranding. Left renal cysts measuring 1.3 cm  anteriorly and 1.2 cm laterally at the interpolar kidney. No hydronephrosis. Vessels: Moderate atherosclerosis. There is coarse calcification at the visceral  ostia. PELVIS FINDINGS:    Bladder/ Pelvic Organs: Bladder is unremarkable. Fibroid uterus with an up to  4.1 cm subserosal fibroid at the right fundus. Lung Base: Unremarkable. Bones/Soft tissues: Lumbar facet hypertrophy and arthropathy. Narrowing  bilateral hip joint spaces. Degenerative disc disease. Impression  No acute findings to explain patient's symptoms.  Suboptimal evaluation of  pyelonephritis without IV contrast.  Diverticulosis. Moderate atherosclerosis with coarse calcification visceral ostia. Fibroid uterus. Results from [place] encounter on 03/07/22    CT ABD PELV WO CONT    Narrative  CT Abdomen And Pelvis without Intravenous Contrast    INDICATION: Generalized abdominal pain. TECHNIQUE: 5 mm collimation axial images obtained from the diaphragm to the  level of the pubic symphysis without administration of low osmolar, nonionic  intravenous contrast.    Dose reduction techniques used: Automated exposure control, adjustment of the  mAs and/or kVp according to patient size, standardized low-dose protocol, and/or  iterative reconstruction technique. COMPARISON: March 18, 2021. ABDOMEN FINDINGS:    Lung Bases: Bibasilar atelectasis. Liver: Normal attenuation. Small hepatic cyst.    Gallbladder: Present and appears normal. No biliary ductal dilatation. Pancreas: Normal attenuation without mass or ductal dilatation. Spleen: Normal in size. No evidence of mass. .    Adrenal Glands: No evidence for mass. Kidneys:    Right: Normal size. Renal cysts. No hydronephrosis. Left:  Mildly atrophic. No cortical mass. No hydronephrosis. Lymph Nodes: No lymphadenopathy. Aorta: Atherosclerosis. PELVIS FINDINGS:    Bowel: Small Bowel: Normal in caliber with normal wall thickness. Large Bowel: Colonic diverticulosis without evidence of acute diverticulitis. Eveline Yossi Appendix: Normal appendix. Bladder: Normal.    Fibroid uterus. No suspicious adnexal mass. No ascites or free air. Bones: No acute finding. Impression  No acute inflammation in abdomen or pelvis. Incidentals as above. Results from [place] encounter on 12/16/21    CT HEAD WO CONT    Narrative  CT HEAD WO CONT: 12/16/2021 9:56 PM    CLINICAL INFORMATION  Altered mental status. COMPARISON  18 March 2021    TECHNIQUE  Axial CT images of the brain acquired.  Sagittal and coronal reformations  performed. The following CT dose reduction techniques were utilized: automated exposure  control and/or adjustment of the mA and/or kV according to patient size, and the  use of iterative reconstruction technique    FINDINGS  INTRA-AXIAL STRUCTURES  Cerebral cortex: Generalized cortical atrophy present. No evidence of  intracranial mass or hemorrhage. Normal gray-white differentiation. Moderate  confluent hypodensities within the periventricular and subcortical white matter  bilaterally. Ventricular system: Normal in size and morphology for the patient's age. Midline shift: No evidence of midline shift. Sella: The sella turcica and hypothalamic region are within normal limits. Cerebellum: The cerebellum is normal in appearance. Normal gray-white  differentiation. Brainstem: The brainstem is normal in appearance. The prepontine cistern is  within normal limits. Normal gray-white differentiation. EXTRA-AXIAL STRUCTURES  Normal in size and morphology for the patient's age. VASCULATURE  Normal.    EXTRACRANIAL SOFT TISSUE STRUCTURES  Orbits: The orbits and globes are within normal limits. Sinuses: Patent paranasal sinuses and mastoid air cells bilaterally. Visualized upper cervical spine: Normal.    OSSEOUS STRUCTURES  No evidence of acute fracture. Impression  No intracranial hemorrhage or mass effect. Moderate sequela of chronic microvascular ischemic disease. MRI Results (maximum last 3): No results found for this or any previous visit. Nuclear Medicine Results (maximum last 3): No results found for this or any previous visit. US Results (maximum last 3): Results from Hospital Encounter encounter on 12/16/21    US RETROPERITONEUM COMP    Narrative  EXAM: RETROPERITONEAL ULTRASOUND    INDICATION: CARA. Malaise. Acute renal failure. TECHNIQUE: Select gray scale and color doppler images from retroperitoneal  ultrasound provided for interpretation.     COMPARISON: Abdominal ultrasound 3/19/2021, CT chest/abdomen/pelvis 3/18/2021    FINDINGS:  Right Kidney: 8.5 cm in length. Vascular flow is demonstrated to the right  kidney. Increased echogenicity. No hydronephrosis. No solid mass is seen. Few small cysts, measuring up to 1.5 cm in the mid  kidney. No renal stones. Left Kidney: 8.7 cm in length. Vascular flow is demonstrated to the left kidney. Increased echogenicity. No hydronephrosis. No solid mass is seen. No renal stones. Urinary Bladder: No wall thickening or intraluminal debris. Post void residual  not measured. Free Fluid: None. Visualized portions of the aorta and IVC are within normal limits. Impression  1. No hydronephrosis. 2. Increased echogenicity of the kidneys suggests medical renal disease. Thank you for enabling us to participate in the care of this patient. Results from [place] encounter on 03/17/21    US ABD COMP    Narrative  EXAM: US ABD COMP    CLINICAL INDICATION/HISTORY : fever / high LFT / CARA on CKD 3-4 - need renal ,  liver and GB view. TECHNIQUE: Selected static images from complete abdominal ultrasound provided  for interpretation. COMPARISON: 10/26/2019 ultrasound    FINDINGS:  Some limitation due to body habitus and bowel gas. The pancreas is not well visualized due to bowel gas. Visualized portions appear  unremarkable    The liver is of homogeneous echotexture without mass in the visualized portions. There is no biliary ductal dilatation. The common bile duct measures 5mm. The gallbladder is normal in configuration without wall thickening or calculus. The right kidney measures 9.2 cm in length and there is a 1.4 cm cyst in the mid  kidney. The left kidney measures 9.4 cm in length and is without mass or evidence of  obstruction. The spleen is unremarkable. No free fluid is noted in the upper abdomen. The inferior vena cava is grossly normal in caliber.     Proximal aorta measures 1.7 cm  . Mid and distal aorta not seen due to bowel gas and body habitus. Color, gray scale and spectral doppler/duplex waveform analysis was performed of  the portal vein to evaluate direction of flow and for thrombosis demonstrates  normal flow and no thrombosis. Impression  1. Right renal cyst.  2.  Otherwise unremarkable with limitations due to body habitus and bowel gas. .      Results from [place] encounter on 10/18/19    US ABD COMP    Narrative  EXAMINATION: Ultrasound abdomen complete    INDICATION: Cardiac preprocedure exam    COMPARISON: None    TECHNIQUE: Grayscale, color, spectral sonographic images of the abdomen  obtained. FINDINGS:    Liver: 15.7 cm length. Slightly coarsened increased echotexture. No focal  abnormality. Portal vein normal caliber with antegrade flow. Pancreas: Unremarkable. Tail not well visualized. IVC: Not well visualized. Right kidney: 9.4 cm length. Normal echotexture. No hydronephrosis. 1.2 x 1.1 cm  cyst in the mid kidney. Gallbladder: A few layering calculi. No wall thickening. Negative sonographic  Grey's sign. Biliary tree: Common bile duct 0.4 cm caliber. No intrahepatic duct dilatation. Abdominal aorta: No aneurysm. Spleen: 8.8 cm length. No focal abnormality. Left kidney: 8.8 cm length. Normal echotexture. No hydronephrosis. No focal  abnormality. Impression  IMPRESSION:    Cholelithiasis without evidence of cholecystitis. Slight coarsened hepatic echotexture suggests nonspecific hepatocellular  disease. Small right renal cyst.    No other significant findings. Exam limitations described above. DEXA Results (maximum last 3): Results from Hospital Encounter encounter on 07/17/17    DEXA BONE DENSITY STUDY AXIAL    Narrative  DEXA BONE DENSITOMETRY, CENTRAL    CPT CODE: 95286    INDICATION: Post menopausal. 53-year-old female for baseline study. Vitamin D  deficiency. Hypertension.  Taking vitamin D. TECHNIQUE: Using GE LUNAR Prodigy densitometer, bone density measurement was  performed in the lumbar spine, the proximal left and right femora. T Score  refers to standard deviations above or below average compared to a young adult  of the same sex. Z Score refers to standard deviations above or below average  compared to a patient of the same sex, age, race and weight. COMPARISON: None available. FINDINGS:    Lumbar Spine Levels: L1-L4  Mean Bone Mineral Density (BMD):  1.336 g/cm2  T Score: 1.1  Z Score: 1.3    Left Total Proximal Femur BMD: 1.102 g/cm2  T Score:  0.8  Z Score:  0.4    Right Total Proximal Femur BMD: 1.183 g/cm2  T Score:  1.4  Z Score:  1.1    Left Femoral Neck BMD:  0.978 g/cm2  T Score: -0.4  Z Score: -0.4    Right Femoral Neck BMD:  1.116 g/cm2  T Score: 0.6  Z Score: 0.6    Impression  IMPRESSION:    BMD measures within normal limits. Based upon current ISCD guidelines, the patient's overall diagnostic category,  selected using WHO criteria in postmenopausal women and males aged 48 and above,  is selected based upon the lowest T Score from among the lumbar spine, total  femur, femoral neck, (or distal third radius if measured). WHO Definition of Osteoporosis and Osteopenia on DXA (specified for post  menopausal  females):    Normal:                     T Score at or above -1 SD  Osteopenia:              T Score between -1 and -2.5 SD  Osteoporosis:          T Score at or below -2.5 SD    The risk of fracture approximately doubles for each 1 SD decrease in T Score. It is important to consider other factors in assessing a patient's risk of  fracture, including age, risk of falling/injury, history of fragility fracture,  family history of osteoporosis, smoking, low weight. Various fracture risk tools have been developed for adult patients and are  available online. For example, the FRAX tool developed by [facility] is widely used. Reference www.iscd.org.     It is also important to note that DXA measures bone density but does not  distinguish among causes of decreased bone density, which include primary versus  secondary osteoporosis (such as metabolic bone disorders or possible effects of  medications) and also other conditions (such as osteomalacia). Clinical  considerations should determine what additional evaluation may be warranted to  exclude secondary conditions in a patient with low bone density. Please note that reliable, valid comparisons can not be made between studies  which have been performed on different densitometers. If clinically warranted,  follow up study performed at this site would best permit assessment of trend for  possible change in bone mineral density over time in comparison to this study. Thank you for this referral.      [facility] [facility]maximum last [facility] Results from [place] encounter on 10/19/20    [facility] MAMMO BI SCREENING INCL CAD    Narrative  Bilateral Screening Mammogram Digital with CAD    HISTORY: Asymptomatic. COMPARISON: Mammogram 2015, 2017, 2018    TECHNIQUE: Digital views were obtained with computer assisted detection, iCAD  Second Look 7.2- H. FINDINGS:  There is no developing mass or suspicious calcifications. Impression  IMPRESSION:    No evidence of malignancy. Annual mammography recommended as per the Society of Breast Imaging and the  [facility] of Radiology guidelines. BIRADS 1: Negative. Breast Density: B- There are scattered fibroglandular tissue densities. Results from [place] encounter on 08/21/18    [facility] 3D NOAH W MAMMO BI SCREENING INCL CAD    Narrative  Digital Screening Mammogram with Tomosynthesis    History: Screening. Comparison: 2015, 2017    Technique: Digital mammography (2D and 3D Tomosynthesis) with CAD was performed. Routine views obtained. Findings:  No developing masses or suspicious calcifications are seen.     Impression  Impression:  No evidence for malignancy. Suggest routine follow-up with annual mammographic screening. BIRADS 1 : Negative    The breasts are almost entirely fatty. Thank you for this referral.      [facility]    [facility] 3D NOHA W MAMMO BI SCREENING INCL CAD    Narrative  Bilateral Screening Mammogram Digital with CAD    HISTORY: Asymptomatic    COMPARISON: Mammogram 2015    TECHNIQUE: Digital CC and MLO views of both breasts with computer assisted  detection, iCAD Second Look 7.2- H. FINDINGS:  No new focal mass is seen. There is no distortion . There are no suspicious  calcifications. Impression  IMPRESSION:    No demonstration of malignancy. Annual mammography recommended as per the Society of Breast Imaging and the  [facility] of Radiology guidelines. BIRADS 2: Benign finding(s)    Breast composition:  There are scattered areas of fibroglandular density. IR Results (maximum last 3): No results found for this or any previous visit. VAS/US Results (maximum last 3): No results found for this or any previous visit. PET Results (maximum last 3): No results found for this or any previous visit. No results found for this or any previous visit. @LASTPROCAMB([id number])    CULTURE:   )No results for input(s): SDES, CULT in the last 72 hours. No results for input(s): CULT in the last 72 hours.     Physical Assessment:     Visit Vitals  BP (!) 131/58 (BP 1 Location: Left lower arm, BP Patient Position: At rest)   Pulse 78   Temp 98.6 °F (37 °C)   Resp 18   Ht 5' 4\" (1.626 m)   Wt 97.1 kg (214 lb)   SpO2 97%   BMI 36.73 kg/m²     Last 3 Recorded Weights in this Encounter    06/25/22 2158 06/27/22 1654   Weight: 97.1 kg (214 lb) 97.1 kg (214 lb)       Intake/Output Summary (Last 24 hours) at 6/28/2022 1204  Last data filed at 6/28/2022 0534  Gross per 24 hour   Intake 3723.33 ml   Output 1000 ml   Net 2723.33 ml       Physial Exam:  General appearance: alert, cooperative, no distress, appears stated age  Skin: normal coloration and turgor, no rashes, no suspicious skin lesions noted. HEENT: Head; normocephalic, atraumatic. PHYLICIA. ENT- ENT exam normal, no neck nodes or sinus tenderness. Lungs: clear to auscultation bilaterally  Heart: S1, S2 normal  Abdomen: soft, non-tender. Bowel sounds normal. No masses,  no organomegaly  Extremities: extremities normal, atraumatic, no cyanosis or edema    PLAN / RECOMMENDATION:    Acute/crf stage 3,hyperkalemia,acidosis ,? Was taking entresto ? Aldactone ? Clinoril. is going to bring all meds from home for review. renal function had improved ,hyperkalemia and acidosis resolved. stop iv bicarb drip.stop arbs,aldactone and nsaids  Hx of cardiomyopathy,will need to go back on lower dose bumex on discharge     Thank you for the consultation to participate in patient's care. I have personally discussed my plan with the referring physician.      Missael Aguero MD  June 28, 2022

## 2022-06-28 NOTE — ROUTINE PROCESS
TRANSFER - IN REPORT:    Verbal report received from Nain Castle RN (name) on Lisa Flaherty  being received from [facility] ED (unit) for routine progression of care      Report consisted of patients Situation, Background, Assessment and   Recommendations(SBAR). Information from the following report(s) SBAR, Kardex, ED Summary, [facility] and Recent Results was reviewed with the receiving nurse. Opportunity for questions and clarification was provided. Assessment completed upon patients arrival to unit and care assumed.

## 2022-06-28 NOTE — PROGRESS NOTES
conducted an initial consultation and Spiritual Assessment for Franky Rivera, who is a 81 y.o.,female. Patients Primary Language is: [place]. According to the patients EMR Amish Affiliation is: No preference. The reason the Patient came to the hospital is:   Patient Active Problem List    Diagnosis Date Noted    Chronic respiratory failure with hypoxia ([address] 06/27/2022    History of myocardial infarction 06/27/2022    Acute hyperkalemia 06/26/2022    Breast cancer ([address] 06/26/2022    Rectal cancer ([address] 06/26/2022    Hyperkalemia 03/07/2022    Stage 1 acute kidney injury ([address] 12/18/2021    Hypotension 03/19/2021    Sepsis ([address] 03/18/2021    CARA (acute kidney injury) ([address] 03/18/2021    Nonrheumatic mitral valve regurgitation 08/13/2020    Chronic systolic congestive heart failure ([address] 11/07/2019    Pulmonary edema cardiac cause ([address] 10/19/2019    COPD with acute exacerbation ([address] 10/19/2019    Respiratory failure requiring intubation ([address] 10/19/2019    Syncope 10/18/2019    Flash pulmonary edema ([address] 10/18/2019    Elevated d-dimer 10/18/2019    Acute on chronic respiratory failure with hypoxia ([address] 10/18/2019    CAD (coronary artery disease) 10/18/2019    Ischemic cardiomyopathy 09/09/2019    CAP (community acquired pneumonia) 08/26/2019    Combined systolic and diastolic congestive heart failure ([address] 08/26/2019    Type 2 diabetes mellitus with hyperglycemia ([address] 08/26/2019    TESSA on CPAP 08/26/2019    COPD exacerbation ([address] 08/25/2019    Type 2 diabetes with nephropathy ([address] 07/12/2018    Severe obesity (BMI 35.0-39. 9) with comorbidity ([address] 07/12/2018    Chest pain 06/09/2018    SOB (shortness of breath) 06/09/2018    Coronary artery disease involving native coronary artery with unstable angina pectoris ([address] 06/09/2018    Pulmonary edema 06/08/2018    Coronary artery disease involving native coronary artery of native heart without angina pectoris 05/31/2018    STEMI (ST elevation myocardial infarction) ([facility] 05/20/2018    Acute pulmonary edema ([facility] 05/20/2018    Caregiver stress 04/26/2017    Bilateral shoulder pain 09/27/2016    Elevated alkaline phosphatase level 09/27/2016    Chronic obstructive pulmonary disease (HCC) 05/17/2016    Compliance with medication regimen 05/17/2016    Chronic pain of both knees 04/13/2016    Chronic pain syndrome 04/13/2016    Primary osteoarthritis of both knees 04/13/2016    Iron deficiency anemia 10/22/2015    Decreased GFR 10/22/2015    Dyslipidemia, goal LDL below 70 10/22/2015    Former smoker 10/22/2015    Osteoarthritis of both knees     Essential hypertension 09/17/2015    COPD (chronic obstructive pulmonary disease) ([facility] 09/01/2015    Diabetes mellitus type 2, insulin dependent ([facility] 07/02/2015    Environmental allergies 04/01/2015    Cardiomyopathy, dilated ([facility] 12/31/2014        The  provided the following Interventions:  Initiated a relationship of care and support. Provided information about Spiritual Care Services. Chart reviewed. The following outcomes where achieved:  Patient is not interested at this time in completing an Advance Medical Directive.  confirmed Patient's Hinduism Affiliation. Patient expressed gratitude for 's visit. Assessment:  Patient does not have any Muslim/cultural needs that will affect patients preferences in health care. There are no spiritual or Muslim issues which require intervention at this time. Plan:  Chaplains will continue to follow and will provide pastoral care on an as needed/requested basis.  recommends bedside caregivers page  on duty if patient shows signs of acute spiritual or emotional distress.     [address]  ([phone])

## 2022-06-28 NOTE — ROUTINE PROCESS
Bedside and Verbal shift change report given to Ian Waters RN (oncoming nurse) by Dilip Barrow RN (offgoing nurse). Report included the following information SBAR, Kardex, MAR and Recent Results.

## 2022-06-28 NOTE — PROGRESS NOTES
Problem: Falls - Risk of  Goal: *Absence of Falls  Description: Document Leafy Byrnes Fall Risk and appropriate interventions in the flowsheet.   Outcome: Progressing Towards Goal  Note: Fall Risk Interventions:            Medication Interventions: Teach patient to arise slowly,Patient to call before getting OOB                   Problem: Patient Education: Go to Patient Education Activity  Goal: Patient/Family Education  Outcome: Progressing Towards Goal     Problem: Patient Education: Go to Patient Education Activity  Goal: Patient/Family Education  Outcome: Progressing Towards Goal     Problem: Pain  Goal: *Control of Pain  Outcome: Progressing Towards Goal  Goal: *PALLIATIVE CARE:  Alleviation of Pain  Outcome: Progressing Towards Goal     Problem: Patient Education: Go to Patient Education Activity  Goal: Patient/Family Education  Outcome: Progressing Towards Goal

## 2022-06-28 NOTE — ROUTINE PROCESS
Wound Prevention Checklist    Patient: Marianna Wen (43 y.o. female)  Date: 6/28/2022  Diagnosis: Acute hyperkalemia [E87.5]  Breast cancer ([facility] [C50.919]  Rectal cancer ([facility] [C20]  Acute renal failure (ARF) (HCC) [N17.9] Acute hyperkalemia    Precautions:         []  Heel prevention boots placed on patient    []  Patient turned q2h during shift    []  Lift team ordered    [x]  Patient on Kelly bed/Specialty bed    []  Each Wound is documented during shift (Stage, Color, drainage, odor, measurements, and dressings)    [x]  Dual skin checks done at bedside during shift report with Luci Gainer, RN Zipporah Epley, RN

## 2022-06-28 NOTE — ROUTINE PROCESS
TRANSFER - OUT REPORT:    Verbal report given to *FLORENCIA Taylor on Adán Jackson  being transferred to *[facility] for routine progression of care       Report consisted of patients Situation, Background, Assessment and   Recommendations(SBAR). Information from the following report(s) ED Summary was reviewed with the receiving nurse. Lines:   Peripheral IV 06/27/22 Left Antecubital (Active)   Site Assessment Clean, dry, & intact 06/27/22 1834   Phlebitis Assessment 0 06/27/22 1834   Infiltration Assessment 0 06/27/22 1834   Dressing Status Clean, dry, & intact 06/27/22 1834        Opportunity for questions and clarification was provided.       Patient transported with:   Monitor

## 2022-06-28 NOTE — PROGRESS NOTES
Problem: Mobility Impaired (Adult and Pediatric)  Goal: *Acute Goals and Plan of Care (Insert Text)  Outcome: Resolved/Met     PHYSICAL THERAPY EVALUATION AND DISCHARGE    Patient: Yousif Brown (57 y.o. female)  Date: 6/28/2022  Primary Diagnosis: Acute hyperkalemia [E87.5]  Breast cancer ([facility] [C50.919]  Rectal cancer ([facility] [C20]  Acute renal failure (ARF) (HCC) [N17.9]        Precautions:      WBAT  PLOF: pt lives with her  in a 1  with 3-4 JUSTICE, has RW/SPC for use as needed, mod ind  PTA     ASSESSMENT :  Based on the objective data described below, the patient presents with baseline functional mobility level. Pt with ROM and strength WFL. Mod ind for bed mob, sit <> stand, and gait within room with no AD. Pt is without acute functional mobility deficits and does not require acute PT, will sign off with no discharge needs noted, left in bed with all needs met. Patient does not require further skilled intervention at this level of care. PLAN :  Recommendations and Planned Interventions:   No formal PT needs identified at this time. Discharge Recommendations: None  Further Equipment Recommendations for Discharge: N/A     SUBJECTIVE:   Patient stated I feel better.     OBJECTIVE DATA SUMMARY:     Past Medical History:   Diagnosis Date    Abnormal WBC count 5/17/2016    Anemia     Bilateral shoulder pain 9/27/2016    Chondromalacia of both patellae     COPD (chronic obstructive pulmonary disease) ([facility] 9/2015    mild-mod dz; Dr Gema Meza    Diabetes [facility]) 2013    Diabetic eye exam ([facility] 2016    Dilated cardiomyopathy ([facility]     Dyslipidemia     Gout     Heart attack ([facility] 10/18/2019    History of echocardiogram 11/14/2014    Mild LVE. EF 40%. Mild, diffuse hypk. Mild LAE. Mild MR.       Hypercholesteremia 1/08/14    Hypertension 2000    Noncompliance with medications 2/16/2016    Obesity     Orthostatic hypotension 5/17/2016    Osteoarthritis of both knees     Pain management 04/01/2016 Dr. Dedira Adams     Popliteal cyst, bilateral      Vitamin D deficiency 10/16/14     Past Surgical History:   Procedure Laterality Date    HX HEART CATHETERIZATION      HX TUBAL LIGATION       Barriers to Learning/Limitations: None  Compensate with: N/A  Home Situation:   Home Situation  Home Environment: Private residence  # Steps to Enter: 4  One/Two Story Residence: One story  Living Alone: No  Support Systems: Spouse/Significant Other  Patient Expects to be Discharged to[de-identified] Home  Current DME Used/Available at Home: Cane, straight,Walker, rolling  Critical Behavior:  Neurologic State: Alert  Orientation Level: Oriented X4  Cognition: Follows commands  Safety/Judgement: Fall prevention  Psychosocial  Patient Behaviors: Calm; Cooperative                 Strength:    Strength: Within functional limits                    Tone & Sensation:   Tone: Normal              Sensation: Intact               Range Of Motion:  AROM: Within functional limits       Functional Mobility:  Bed Mobility:     Supine to Sit: Modified independent  Sit to Supine: Modified independent  Scooting: Modified independent  Transfers:  Sit to Stand: Modified independent  Stand to Sit: Modified independent       Balance:   Sitting: Intact  Standing: Intact    Ambulation/Gait Training:    Gait Description (WDL): Within defined limits (no AD, mod ind within room, no gait deficits noted )    Pain:  Pain level pre-treatment: 0/10   Pain level post-treatment: 0/10  Pain Intervention(s): Medication (see MAR); Rest, Ice, Repositioning   Response to intervention: Nurse notified    Activity Tolerance:   Good    Please refer to the flowsheet for vital signs taken during this treatment.   After treatment:   []         Patient left in no apparent distress sitting up in chair  [x]         Patient left in no apparent distress in bed  [x]         Call bell left within reach  [x]         Nursing notified  []         Caregiver present  []         Bed alarm activated  []         SCDs applied    COMMUNICATION/EDUCATION:   [x]         Role of Physical Therapy in the acute care setting. [x]         Fall prevention education was provided and the patient/caregiver indicated understanding. [x]         Patient/family have participated as able in goal setting and plan of care. [x]         Patient/family agree to work toward stated goals and plan of care. []         Patient understands intent and goals of therapy, but is neutral about his/her participation. []         Patient is unable to participate in goal setting/plan of care: ongoing with therapy staff.  []         Other:     Thank you for this referral.  Kym Devi   Time Calculation: 8 mins      Eval Complexity: History: MEDIUM  Complexity : 1-2 comorbidities / personal factors will impact the outcome/ POC Exam:MEDIUM Complexity : 3 Standardized tests and measures addressing body structure, function, activity limitation and / or participation in recreation  Presentation: MEDIUM Complexity : Evolving with changing characteristics  Clinical Decision Making:Medium Complexity    Overall Complexity:MEDIUM

## 2022-06-28 NOTE — ED NOTES
Dr Huff Bolus cancelled brovana [facility] tx. Pt refused and never took med before, as reported by pt. Currently lungs clear, even, unlabored.

## 2022-06-28 NOTE — PROGRESS NOTES
Wound Prevention Checklist    Patient: Nir Winkler (74 y.o. female)  Date: 6/28/2022  Diagnosis: Acute hyperkalemia [E87.5]  Breast cancer ([facility] [C50.919]  Rectal cancer ([facility] [C20]  Acute renal failure (ARF) (HCC) [N17.9] Acute hyperkalemia    Precautions:         []  Heel prevention boots placed on patient    []  Patient turned q2h during shift    []  Lift team ordered    [x]  Patient on Kelly bed/Specialty bed    []  Each Wound is documented during shift (Stage, Color, drainage, odor, measurements, and dressings)    [x]  Dual skin checks done at bedside during shift report with FLORENCIA Barbozai Morelia       Bedside shift change report given to FLORENCIA Barboza (oncoming nurse) by Scottie marroquin RN (offgoing nurse). Report included the following information SBAR, Kardex, MAR and Recent Results.

## 2022-06-28 NOTE — PROGRESS NOTES
OT orders received and medical chart review completed. Pt presents at baseline as PLOF as Mod I with ADLs and functional mobility w/o RW, reports no difficulty with ADLs. Formal OT evaluation not indicated. OT to sign off.

## 2022-06-29 ENCOUNTER — APPOINTMENT (OUTPATIENT)
Dept: NON INVASIVE DIAGNOSTICS | Age: 72
End: 2022-06-29
Attending: STUDENT IN AN ORGANIZED HEALTH CARE EDUCATION/TRAINING PROGRAM
Payer: MEDICARE

## 2022-06-29 VITALS
DIASTOLIC BLOOD PRESSURE: 67 MMHG | SYSTOLIC BLOOD PRESSURE: 128 MMHG | HEIGHT: 64 IN | WEIGHT: 214 LBS | OXYGEN SATURATION: 98 % | TEMPERATURE: 98.5 F | BODY MASS INDEX: 36.54 KG/M2 | HEART RATE: 81 BPM | RESPIRATION RATE: 18 BRPM

## 2022-06-29 LAB
ANION GAP SERPL CALC-SCNC: 4 MMOL/L (ref 3–18)
BASOPHILS # BLD: 0 K/UL (ref 0–0.1)
BASOPHILS NFR BLD: 0 % (ref 0–2)
BUN SERPL-MCNC: 45 MG/DL (ref 7–18)
BUN/CREAT SERPL: 31 (ref 12–20)
CALCIUM SERPL-MCNC: 8.3 MG/DL (ref 8.5–10.1)
CHLORIDE SERPL-SCNC: 107 MMOL/L (ref 100–111)
CO2 SERPL-SCNC: 28 MMOL/L (ref 21–32)
CREAT SERPL-MCNC: 1.47 MG/DL (ref 0.6–1.3)
DIFFERENTIAL METHOD BLD: ABNORMAL
ECHO AO ROOT DIAM: 2.7 CM
ECHO AO ROOT INDEX: 1.34 CM/M2
ECHO LA VOL 2C: 65 ML (ref 22–52)
ECHO LA VOL 4C: 43 ML (ref 22–52)
ECHO LA VOLUME AREA LENGTH: 59 ML
ECHO LA VOLUME INDEX A2C: 32 ML/M2 (ref 16–34)
ECHO LA VOLUME INDEX A4C: 21 ML/M2 (ref 16–34)
ECHO LA VOLUME INDEX AREA LENGTH: 29 ML/M2 (ref 16–34)
ECHO LV E' LATERAL VELOCITY: 5 CM/S
ECHO LV E' SEPTAL VELOCITY: 5 CM/S
ECHO LV FRACTIONAL SHORTENING: 29 % (ref 28–44)
ECHO LV INTERNAL DIMENSION DIASTOLE INDEX: 2.79 CM/M2
ECHO LV INTERNAL DIMENSION DIASTOLIC: 5.6 CM (ref 3.9–5.3)
ECHO LV INTERNAL DIMENSION SYSTOLIC INDEX: 1.99 CM/M2
ECHO LV INTERNAL DIMENSION SYSTOLIC: 4 CM
ECHO LV IVSD: 1.1 CM (ref 0.6–0.9)
ECHO LV MASS 2D: 249.3 G (ref 67–162)
ECHO LV MASS INDEX 2D: 124 G/M2 (ref 43–95)
ECHO LV POSTERIOR WALL DIASTOLIC: 1.1 CM (ref 0.6–0.9)
ECHO LV RELATIVE WALL THICKNESS RATIO: 0.39
ECHO LVOT AREA: 2.5 CM2
ECHO LVOT DIAM: 1.8 CM
ECHO LVOT MEAN GRADIENT: 2 MMHG
ECHO LVOT PEAK GRADIENT: 4 MMHG
ECHO LVOT PEAK VELOCITY: 1.1 M/S
ECHO LVOT STROKE VOLUME INDEX: 25.8 ML/M2
ECHO LVOT SV: 51.9 ML
ECHO LVOT VTI: 20.4 CM
ECHO MV A VELOCITY: 0.9 M/S
ECHO MV E DECELERATION TIME (DT): 223.8 MS
ECHO MV E VELOCITY: 0.76 M/S
ECHO MV E/A RATIO: 0.84
ECHO MV E/E' LATERAL: 15.2
ECHO MV E/E' RATIO (AVERAGED): 15.2
ECHO MV E/E' SEPTAL: 15.2
ECHO RV FREE WALL PEAK S': 12 CM/S
ECHO RV TAPSE: 2.4 CM (ref 1.7–?)
EOSINOPHIL # BLD: 0.1 K/UL (ref 0–0.4)
EOSINOPHIL NFR BLD: 1 % (ref 0–5)
ERYTHROCYTE [DISTWIDTH] IN BLOOD BY AUTOMATED COUNT: 15.5 % (ref 11.6–14.5)
EST. AVERAGE GLUCOSE BLD GHB EST-MCNC: 160 MG/DL
GLUCOSE BLD STRIP.AUTO-MCNC: 129 MG/DL (ref 70–110)
GLUCOSE BLD STRIP.AUTO-MCNC: 140 MG/DL (ref 70–110)
GLUCOSE SERPL-MCNC: 135 MG/DL (ref 74–99)
HBA1C MFR BLD: 7.2 % (ref 4.2–5.6)
HCT VFR BLD AUTO: 32.6 % (ref 35–45)
HGB BLD-MCNC: 10.4 G/DL (ref 12–16)
IMM GRANULOCYTES # BLD AUTO: 0 K/UL (ref 0–0.04)
IMM GRANULOCYTES NFR BLD AUTO: 0 % (ref 0–0.5)
LYMPHOCYTES # BLD: 1.5 K/UL (ref 0.9–3.6)
LYMPHOCYTES NFR BLD: 21 % (ref 21–52)
MCH RBC QN AUTO: 27.8 PG (ref 24–34)
MCHC RBC AUTO-ENTMCNC: 31.9 G/DL (ref 31–37)
MCV RBC AUTO: 87.2 FL (ref 78–100)
MONOCYTES # BLD: 0.6 K/UL (ref 0.05–1.2)
MONOCYTES NFR BLD: 8 % (ref 3–10)
NEUTS SEG # BLD: 5 K/UL (ref 1.8–8)
NEUTS SEG NFR BLD: 70 % (ref 40–73)
NRBC # BLD: 0.05 K/UL (ref 0–0.01)
NRBC BLD-RTO: 0.7 PER 100 WBC
PLATELET # BLD AUTO: 247 K/UL (ref 135–420)
PMV BLD AUTO: 10.3 FL (ref 9.2–11.8)
POTASSIUM SERPL-SCNC: 4.8 MMOL/L (ref 3.5–5.5)
RBC # BLD AUTO: 3.74 M/UL (ref 4.2–5.3)
SODIUM SERPL-SCNC: 139 MMOL/L (ref 136–145)
WBC # BLD AUTO: 7.1 K/UL (ref 4.6–13.2)

## 2022-06-29 PROCEDURE — 82962 GLUCOSE BLOOD TEST: CPT

## 2022-06-29 PROCEDURE — 74011250637 HC RX REV CODE- 250/637: Performed by: STUDENT IN AN ORGANIZED HEALTH CARE EDUCATION/TRAINING PROGRAM

## 2022-06-29 PROCEDURE — 36415 COLL VENOUS BLD VENIPUNCTURE: CPT

## 2022-06-29 PROCEDURE — 83036 HEMOGLOBIN GLYCOSYLATED A1C: CPT

## 2022-06-29 PROCEDURE — G0378 HOSPITAL OBSERVATION PER HR: HCPCS

## 2022-06-29 PROCEDURE — 93306 TTE W/DOPPLER COMPLETE: CPT

## 2022-06-29 PROCEDURE — 94640 AIRWAY INHALATION TREATMENT: CPT

## 2022-06-29 PROCEDURE — 96376 TX/PRO/DX INJ SAME DRUG ADON: CPT

## 2022-06-29 PROCEDURE — 74011250637 HC RX REV CODE- 250/637: Performed by: INTERNAL MEDICINE

## 2022-06-29 PROCEDURE — 99239 HOSP IP/OBS DSCHRG MGMT >30: CPT | Performed by: STUDENT IN AN ORGANIZED HEALTH CARE EDUCATION/TRAINING PROGRAM

## 2022-06-29 PROCEDURE — 85025 COMPLETE CBC W/AUTO DIFF WBC: CPT

## 2022-06-29 PROCEDURE — 74011250636 HC RX REV CODE- 250/636: Performed by: INTERNAL MEDICINE

## 2022-06-29 PROCEDURE — 80048 BASIC METABOLIC PNL TOTAL CA: CPT

## 2022-06-29 PROCEDURE — 74011000250 HC RX REV CODE- 250: Performed by: STUDENT IN AN ORGANIZED HEALTH CARE EDUCATION/TRAINING PROGRAM

## 2022-06-29 PROCEDURE — 74011000250 HC RX REV CODE- 250: Performed by: INTERNAL MEDICINE

## 2022-06-29 PROCEDURE — 74011636637 HC RX REV CODE- 636/637: Performed by: STUDENT IN AN ORGANIZED HEALTH CARE EDUCATION/TRAINING PROGRAM

## 2022-06-29 PROCEDURE — 94761 N-INVAS EAR/PLS OXIMETRY MLT: CPT

## 2022-06-29 RX ADMIN — EZETIMIBE 10 MG: 10 TABLET ORAL at 09:20

## 2022-06-29 RX ADMIN — SODIUM CHLORIDE, PRESERVATIVE FREE 10 ML: 5 INJECTION INTRAVENOUS at 06:51

## 2022-06-29 RX ADMIN — HYDRALAZINE HYDROCHLORIDE 10 MG: 20 INJECTION, SOLUTION INTRAMUSCULAR; INTRAVENOUS at 09:30

## 2022-06-29 RX ADMIN — ARFORMOTEROL TARTRATE 15 MCG: 15 SOLUTION RESPIRATORY (INHALATION) at 07:54

## 2022-06-29 RX ADMIN — ASPIRIN 81 MG CHEWABLE TABLET 162 MG: 81 TABLET CHEWABLE at 09:20

## 2022-06-29 RX ADMIN — Medication 15 UNITS: at 09:20

## 2022-06-29 RX ADMIN — CARVEDILOL 25 MG: 25 TABLET, FILM COATED ORAL at 09:19

## 2022-06-29 RX ADMIN — ATORVASTATIN CALCIUM 80 MG: 40 TABLET, FILM COATED ORAL at 09:19

## 2022-06-29 NOTE — ROUTINE PROCESS
Wound Prevention Checklist    Patient: Charlie Castillo (44 y.o. female)  Date: 6/29/2022  Diagnosis: Acute hyperkalemia [E87.5]  Breast cancer ([facility] [C50.919]  Rectal cancer ([address] [C20]  Acute renal failure (ARF) ([address] [N17.9] Acute hyperkalemia    Precautions:         []  Heel prevention boots placed on patient    []  Patient turned q2h during shift    []  Lift team ordered    [x]  Patient on [place] bed/Specialty bed    []  Each Wound is documented during shift (Stage, Color, drainage, odor, measurements, and dressings)    [x]  Dual skin checks done at bedside during shift report with FLORENCIA Jalloh RN

## 2022-06-29 NOTE — PROGRESS NOTES
Problem: Falls - Risk of  Goal: *Absence of Falls  Description: Document Jenny Terry Fall Risk and appropriate interventions in the flowsheet. Outcome: Progressing Towards Goal  Note: Fall Risk Interventions:            Medication Interventions: Teach patient to arise slowly    Elimination Interventions: Call light in reach    History of Falls Interventions: Door open when patient unattended         Problem: Patient Education: Go to Patient Education Activity  Goal: Patient/Family Education  Outcome: Progressing Towards Goal     Problem: Patient Education: Go to Patient Education Activity  Goal: Patient/Family Education  Outcome: Progressing Towards Goal     Problem: Pain  Goal: *Control of Pain  Outcome: Progressing Towards Goal  Goal: *PALLIATIVE CARE:  Alleviation of Pain  Outcome: Progressing Towards Goal     Problem: Patient Education: Go to Patient Education Activity  Goal: Patient/Family Education  Outcome: Progressing Towards Goal     Problem: Diabetes Self-Management  Goal: *Disease process and treatment process  Description: Define diabetes and identify own type of diabetes; list 3 options for treating diabetes. Outcome: Progressing Towards Goal  Goal: *Incorporating nutritional management into lifestyle  Description: Describe effect of type, amount and timing of food on blood glucose; list 3 methods for planning meals. Outcome: Progressing Towards Goal  Goal: *Incorporating physical activity into lifestyle  Description: State effect of exercise on blood glucose levels. Outcome: Progressing Towards Goal  Goal: *Developing strategies to promote health/change behavior  Description: Define the ABC's of diabetes; identify appropriate screenings, schedule and personal plan for screenings. Outcome: Progressing Towards Goal  Goal: *Using medications safely  Description: State effect of diabetes medications on diabetes; name diabetes medication taking, action and side effects.   Outcome: Progressing Towards Goal  Goal: *Monitoring blood glucose, interpreting and using results  Description: Identify recommended blood glucose targets  and personal targets. Outcome: Progressing Towards Goal  Goal: *Prevention, detection, treatment of acute complications  Description: List symptoms of hyper- and hypoglycemia; describe how to treat low blood sugar and actions for lowering  high blood glucose level. Outcome: Progressing Towards Goal  Goal: *Prevention, detection and treatment of chronic complications  Description: Define the natural course of diabetes and describe the relationship of blood glucose levels to long term complications of diabetes.   Outcome: Progressing Towards Goal  Goal: *Developing strategies to address psychosocial issues  Description: Describe feelings about living with diabetes; identify support needed and support network  Outcome: Progressing Towards Goal  Goal: *Insulin pump training  Outcome: Progressing Towards Goal  Goal: *Sick day guidelines  Outcome: Progressing Towards Goal  Goal: *Patient Specific Goal (EDIT GOAL, INSERT TEXT)  Outcome: Progressing Towards Goal     Problem: Patient Education: Go to Patient Education Activity  Goal: Patient/Family Education  Outcome: Progressing Towards Goal

## 2022-06-29 NOTE — PROGRESS NOTES
D/C order noted for today. Orders reviewed. No needs identified at this time. CM remains available if needed.     LUZ MARIA PenaN, RN  Pager # [phone]  Care Manager

## 2022-06-29 NOTE — DISCHARGE INSTRUCTIONS
DISCHARGE SUMMARY from Nurse    PATIENT INSTRUCTIONS:    After general anesthesia or intravenous sedation, for 24 hours or while taking prescription Narcotics:  · Limit your activities  · Do not drive and operate hazardous machinery  · Do not make important personal or business decisions  · Do  not drink alcoholic beverages  · If you have not urinated within 8 hours after discharge, please contact your surgeon on call. Report the following to your surgeon:  · Excessive pain, swelling, redness or odor of or around the surgical area  · Temperature over 100.5  · Nausea and vomiting lasting longer than 4 hours or if unable to take medications  · Any signs of decreased circulation or nerve impairment to extremity: change in color, persistent  numbness, tingling, coldness or increase pain  · Any questions    What to do at Home:  Recommended activity: Activity as tolerated, ***    If you experience any of the following symptoms chest pain, shortness of breath, fever greater than 100.5, nausea, vomiting, pain unrelieved by medication, please follow up with Niles Rausch    *  Please give a list of your current medications to your Primary Care Provider. *  Please update this list whenever your medications are discontinued, doses are      changed, or new medications (including over-the-counter products) are added. *  Please carry medication information at all times in case of emergency situations. These are general instructions for a healthy lifestyle:    No smoking/ No tobacco products/ Avoid exposure to second hand smoke  Surgeon General's Warning:  Quitting smoking now greatly reduces serious risk to your health.     Obesity, smoking, and sedentary lifestyle greatly increases your risk for illness    A healthy diet, regular physical exercise & weight monitoring are important for maintaining a healthy lifestyle    You may be retaining fluid if you have a history of heart failure or if you experience any of the following symptoms:  Weight gain of 3 pounds or more overnight or 5 pounds in a week, increased swelling in our hands or feet or shortness of breath while lying flat in bed. Please call your doctor as soon as you notice any of these symptoms; do not wait until your next office visit. Patient armband removed and shredded  MyChart Activation    Thank you for requesting access to [employer]. Please follow the instructions below to securely access and download your online medical record. [employer] allows you to send messages to your doctor, view your test results, renew your prescriptions, schedule appointments, and more. How Do I Sign Up? 1. In your internet browser, go to [email or web address]  2. Click on the First Time User? Click Here link in the Sign In box. You will be redirect to the New Member Sign Up page. 3. Enter your [employer] Access Code exactly as it appears below. You will not need to use this code after youve completed the sign-up process. If you do not sign up before the expiration date, you must request a new code. [employer] Access Code: Activation code not generated  Current [employer] Status: Active (This is the date your [employer] access code will )    4. Enter the last four digits of your Social Security Number (xxxx) and Date of Birth (mm/dd/yyyy) as indicated and click Submit. You will be taken to the next sign-up page. 5. Create a [employer] ID. This will be your [employer] login ID and cannot be changed, so think of one that is secure and easy to remember. 6. Create a [employer] password. You can change your password at any time. 7. Enter your Password Reset Question and Answer. This can be used at a later time if you forget your password. 8. Enter your e-mail address. You will receive e-mail notification when new information is available in [address] 9. Click Sign Up. You can now view and download portions of your medical record.   10. Click the Download Summary menu link to download a portable copy of your medical information. Additional Information    If you have questions, please visit the Frequently Asked Questions section of the [employer] website at [email or web address] Remember, [employer] is NOT to be used for urgent needs. For medical emergencies, dial 911. The discharge information has been reviewed with the {PATIENT PARENT GUARDIAN:51960}. The {PATIENT PARENT GUARDIAN:29782} verbalized understanding. Discharge medications reviewed with the {[employer] meds reviewed NQQC:29544} and appropriate educational materials and side effects teaching were provided.   ___________________________________________________________________________________________________________________________________

## 2022-06-29 NOTE — PROGRESS NOTES
Problem: Falls - Risk of  Goal: *Absence of Falls  Description: Document Gay Snow Fall Risk and appropriate interventions in the flowsheet.   Outcome: Progressing Towards Goal  Note: Fall Risk Interventions:            Medication Interventions: Teach patient to arise slowly,Patient to call before getting OOB                   Problem: Pain  Goal: *Control of Pain  Outcome: Progressing Towards Goal

## 2022-06-29 NOTE — ROUTINE PROCESS
Bedside and Verbal shift change report given to Billy Bear RN (oncoming nurse) by Eliana Roca RN (offgoing nurse). Report included the following information SBAR, Kardex, MAR and Recent Results.

## 2022-06-29 NOTE — PROGRESS NOTES
Discharge teaching completed at bedside with patient. Opportunity provided for clarifying questions. All answered to patient satisfaction. IV removed. ID removed and shredded. EKG leads removed.

## 2022-06-29 NOTE — PROGRESS NOTES
Progress Note  Hospitalist Service    Patient: Martin Silverman MRN: [id number]   SSN: [id number]  YOB: 1950   Age: 81 y.o. Sex: female      Admit Date: 6/25/2022    LOS: 2 days   Chief Complaint   Patient presents with    Abnormal Lab Results       Subjective:     Patient feels well today. She confirmed that she was taking both entresto and spironlactone. Objective:     Vitals:  Visit Vitals  /64 (BP 1 Location: Right arm, BP Patient Position: At rest)   Pulse 75   Temp 98.7 °F (37.1 °C)   Resp 18   Ht 5' 4\" (1.626 m)   Wt 97.1 kg (214 lb)   SpO2 95%   BMI 36.73 kg/m²       Physical Exam:   General appearance: alert, cooperative, no distress, appears stated age  Lungs: clear to auscultation bilaterally  Heart: regular rate and rhythm, S1, S2 normal, no murmur, click, rub or gallop  Abdomen: soft, non-tender. Bowel sounds normal. No masses,  no organomegaly  Pulses: 2+ and symmetric  Skin: Skin color, texture, turgor normal. No rashes or lesions  Neuro:  normal without focal findings  mental status, speech normal, alert and oriented x iii  PHYLICIA  reflexes normal and symmetric    Intake and Output:  Current Shift: No intake/output data recorded.   Last three shifts: 06/27 0701 - 06/28 1900  In: 3723.3 [I.V.:3723.3]  Out: 1000 [Urine:1000]    Lab/Data Review:  Recent Results (from the past 12 hour(s))   GLUCOSE, POC    Collection Time: 06/28/22 11:28 AM   Result Value Ref Range    Glucose (POC) 154 (H) 70 - 110 mg/dL   GLUCOSE, POC    Collection Time: 06/28/22  4:31 PM   Result Value Ref Range    Glucose (POC) 113 (H) 70 - 110 mg/dL   GLUCOSE, POC    Collection Time: 06/28/22 10:25 PM   Result Value Ref Range    Glucose (POC) 144 (H) 70 - 110 mg/dL         Key Findings or tests:       Telemetry NONE   Oxygen NONE     Assessment and Plan:     Hyperkalemia 2°/2 Acute Kidney Injury on CKD stage III with Metabolic Acidosis  Telemetry  Stop IV Sodium Bicarbonate  Nephrology following [facility] [place]). HOLD Nephrotoxic agents. Stop aldactone, entresto - will need to find alternatives.      Chronic Systolic CHF  Strict I/Os and Daily Weight. HOLD home Bumetanide 1 mg.     COPD   Continue substitute home Breathing Treatments and PRN Duonebs.     CAD  Continue home Aspirin.     HTN  HOLD home Chlorthalidone.     HLD  Continue home Atorvastatin.     Diabetes Mellitus Type 2  POC Glucose checks qACHS with SSI coverage. Long-Acting Insulin 15 Units qday.     DVT Prophylaxis  DVT chemoprophylaxis is achieved with subcutaneous Enoxaparin 40 mg qday.       Miley Fowler DO, hospitalist   June 28, 2022

## 2022-06-29 NOTE — PROGRESS NOTES
Problem: Falls - Risk of  Goal: *Absence of Falls  Description: Document Kelsy Sarmiento Fall Risk and appropriate interventions in the flowsheet. 6/29/2022 1545 by Rhona Gannon RN  Outcome: Resolved/Met  6/29/2022 1144 by Rhona Gannon RN  Outcome: Progressing Towards Goal  Note: Fall Risk Interventions:            Medication Interventions: Teach patient to arise slowly    Elimination Interventions: Call light in reach    History of Falls Interventions: Door open when patient unattended         Problem: Patient Education: Go to Patient Education Activity  Goal: Patient/Family Education  6/29/2022 1545 by Rhona Gannon RN  Outcome: Resolved/Met  6/29/2022 1144 by Rhona Gannon RN  Outcome: Progressing Towards Goal     Problem: Patient Education: Go to Patient Education Activity  Goal: Patient/Family Education  6/29/2022 1545 by Rhona Gannon RN  Outcome: Resolved/Met  6/29/2022 1144 by Rhona Gannon RN  Outcome: Progressing Towards Goal     Problem: Pain  Goal: *Control of Pain  6/29/2022 1545 by Rhona Gannon RN  Outcome: Resolved/Met  6/29/2022 1144 by Rhona Gannon RN  Outcome: Progressing Towards Goal  Goal: *PALLIATIVE CARE:  Alleviation of Pain  6/29/2022 1545 by Rhona Gannon RN  Outcome: Resolved/Met  6/29/2022 1144 by Rhona Gannon RN  Outcome: Progressing Towards Goal     Problem: Patient Education: Go to Patient Education Activity  Goal: Patient/Family Education  6/29/2022 [phone] by Rhona Gannon RN  Outcome: Resolved/Met  6/29/2022 1144 by Rhona Gannon RN  Outcome: Progressing Towards Goal     Problem: Diabetes Self-Management  Goal: *Disease process and treatment process  Description: Define diabetes and identify own type of diabetes; list 3 options for treating diabetes.   6/29/2022 1545 by Rhona Gannon RN  Outcome: Resolved/Met  6/29/2022 1144 by Rhona Gannon RN  Outcome: Progressing Towards Goal  Goal: *Incorporating nutritional management into lifestyle  Description: Describe effect of type, amount and timing of food on blood glucose; list 3 methods for planning meals. 6/29/2022 1545 by Iris Carver RN  Outcome: Resolved/Met  6/29/2022 1144 by Iris Carver RN  Outcome: Progressing Towards Goal  Goal: *Incorporating physical activity into lifestyle  Description: State effect of exercise on blood glucose levels. 6/29/2022 1545 by Iris Carver RN  Outcome: Resolved/Met  6/29/2022 1144 by Iris Carver RN  Outcome: Progressing Towards Goal  Goal: *Developing strategies to promote health/change behavior  Description: Define the ABC's of diabetes; identify appropriate screenings, schedule and personal plan for screenings. 6/29/2022 1545 by Iris Carver RN  Outcome: Resolved/Met  6/29/2022 1144 by Iris Carver RN  Outcome: Progressing Towards Goal  Goal: *Using medications safely  Description: State effect of diabetes medications on diabetes; name diabetes medication taking, action and side effects. 6/29/2022 1545 by Iris Carver RN  Outcome: Resolved/Met  6/29/2022 1144 by Iris Carver RN  Outcome: Progressing Towards Goal  Goal: *Monitoring blood glucose, interpreting and using results  Description: Identify recommended blood glucose targets  and personal targets. 6/29/2022 1545 by Iris Carver RN  Outcome: Resolved/Met  6/29/2022 1144 by Iris Carver RN  Outcome: Progressing Towards Goal  Goal: *Prevention, detection, treatment of acute complications  Description: List symptoms of hyper- and hypoglycemia; describe how to treat low blood sugar and actions for lowering  high blood glucose level.   6/29/2022 1545 by Iris aCrver RN  Outcome: Resolved/Met  6/29/2022 1144 by Iris Carver RN  Outcome: Progressing Towards Goal  Goal: *Prevention, detection and treatment of chronic complications  Description: Define the natural course of diabetes and describe the relationship of blood glucose levels to long term complications of diabetes.   6/29/2022 1545 by Loc Rees RN  Outcome: Resolved/Met  6/29/2022 1144 by Loc Rees RN  Outcome: Progressing Towards Goal  Goal: *Developing strategies to address psychosocial issues  Description: Describe feelings about living with diabetes; identify support needed and support network  6/29/2022 1545 by Loc Rees RN  Outcome: Resolved/Met  6/29/2022 1144 by Loc Rees RN  Outcome: Progressing Towards Goal  Goal: *Insulin pump training  6/29/2022 1545 by Loc Rees RN  Outcome: Resolved/Met  6/29/2022 1144 by Loc Rees RN  Outcome: Progressing Towards Goal  Goal: *Sick day guidelines  6/29/2022 1545 by Loc Rees RN  Outcome: Resolved/Met  6/29/2022 1144 by Loc Rees RN  Outcome: Progressing Towards Goal  Goal: *Patient Specific Goal (EDIT GOAL, INSERT TEXT)  6/29/2022 1545 by Loc Rees RN  Outcome: Resolved/Met  6/29/2022 1144 by Loc Rees RN  Outcome: Progressing Towards Goal     Problem: Patient Education: Go to Patient Education Activity  Goal: Patient/Family Education  6/29/2022 1545 by Loc Rees RN  Outcome: Resolved/Met  6/29/2022 1144 by Loc Rees RN  Outcome: Progressing Towards Goal

## 2022-06-29 NOTE — DISCHARGE SUMMARY
Discharge Summary    Patient: Geraldo Malone MRN: [id number]  CSN: [id number]    YOB: 1950  Age: 81 y.o.   Sex: female    DOA: 6/25/2022 LOS:  LOS: 0 days   Discharge Date: 6/29/20226/29/22     Admission Diagnosis: Acute hyperkalemia [E87.5]  Breast cancer ([facility] [C50.919]  Rectal cancer ([facility] [C20]  Acute renal failure (ARF) ([facility] [N17.9]    Discharge Diagnosis:    Hospital Problems  Date Reviewed: 4/26/2021          Codes Class Noted POA    Chronic respiratory failure with hypoxia (HCC) (Chronic) ICD-10-CM: J96.11  ICD-9-CM: 518.83, 799.02  6/27/2022 Yes        History of myocardial infarction (Chronic) ICD-10-CM: I25.2  ICD-9-CM: 686  6/27/2022 Yes        Hyperkalemia ICD-10-CM: E87.5  ICD-9-CM: 276.7  3/7/2022 Yes        Stage 1 acute kidney injury ([facility] ICD-10-CM: N17.9  ICD-9-CM: 584.9  12/18/2021 Yes        CAD (coronary artery disease) (Chronic) ICD-10-CM: I25.10  ICD-9-CM: 414.00  10/18/2019 Yes    Overview Signed 10/24/2019 10:43 AM by Annelle Baumgarten     Added automatically from request for surgery [id number]             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) ICD-10-CM: I50.40  ICD-9-CM: 428.40  8/26/2019 Yes        Type 2 diabetes with nephropathy (HCC) (Chronic) ICD-10-CM: E11.21  ICD-9-CM: 250.40, 583.81  7/12/2018 Yes        Chronic pain syndrome (Chronic) ICD-10-CM: G89.4  ICD-9-CM: 338.4  4/13/2016 Yes        Dyslipidemia, goal LDL below 70 (Chronic) ICD-10-CM: E78.5  ICD-9-CM: 272.4  10/22/2015 Yes        Former smoker (Chronic) ICD-10-CM: Z40.398  ICD-9-CM: V15.82  10/22/2015 Yes        Essential hypertension (Chronic) ICD-10-CM: I10  ICD-9-CM: 401.9  9/17/2015 Yes        COPD (chronic obstructive pulmonary disease) ([facility] (Chronic) ICD-10-CM: J44.9  ICD-9-CM: 795  9/1/2015 Yes    Overview Signed 9/11/2015  5:45 PM by Sabrina Spann NP     mild-mod dz; Dr Cat Galicia             Diabetes mellitus type 2, insulin dependent ([facility] (Chronic) ICD-10-CM: E11.9, Z79.4  ICD-9-CM: 250.00, V58.67  7/2/2015 Yes        Cardiomyopathy, dilated (HCC) (Chronic) ICD-10-CM: I42.0  ICD-9-CM: 425.4  12/31/2014 Yes              Discharge Condition: Stable  Discharge Disposition: Home     PHYSICAL EXAM  Visit Vitals  /67   Pulse 81   Temp 98.5 °F (36.9 °C)   Resp 18   Ht 5' 4\" (1.626 m)   Wt 97.1 kg (214 lb)   SpO2 98%   BMI 36.73 kg/m²       General: Alert, cooperative, no acute distress    HEENT: NC, Atraumatic. PERRLA, EOMI. Anicteric sclerae. Lungs:  CTA Bilaterally. No Wheezing/Rhonchi/Rales. Heart:  Regular  rhythm,  No murmur, No Rubs, No Gallops  Abdomen: Soft, Non distended, Non tender. +Bowel sounds, no HSM  Extremities: No c/c/e  Psych:   Good insight. Not anxious or agitated. Neurologic:  CN 2-12 grossly intact, oriented X 3. No acute neurological                                 Deficits,     Hospital Course:   Presented to [facility] ED on 6/27/22 after experiencing faintness and dyspnea on exertion at work. Patient initially with hyperkalemia up to 7.1. Initial creatinine of 2.25, improved to 1.55. Bicarb drip started. CT abdomen pelvis without contrast was ordered to rule out obstruction. Results posted below. Given CARA and hyperkalemia Bumex 1 mg was held. She had also been taking Aldactone, Entresto, Clinoril which were discontinued advisement of nephrology. Patient will follow-up with nephrology in outpatient office.      Consults:   Nephrology - Dr. Josiah Chawla     Significant Diagnostic Studies:   CT ABDOMEN AND PELVIS WITHOUT ENHANCEMENT     INDICATION: Syncope, dyspnea on exertion, chills, chronic intermittent diarrhea,  UTI, elevated potassium, creatinine, CVA tenderness, left greater than right.     TECHNIQUE: Axial images obtained of the abdomen and pelvis without intravenous  contrast. Coronal and sagittal reformatted images of the abdomen and pelvis were  obtained.     All CT scans at this facility are performed using dose optimization technique as  appropriate to a performed exam, to include automated exposure control,  adjustment of the mA and/or kV according to patient size (including appropriate  matching first site-specific examinations), or use of iterative reconstruction  technique.     COMPARISON: 3/7/2022. Lack of intravenous contrast renders this study suboptimal for evaluating the  solid abdominal organs, vasculature and bowel.     ABDOMEN FINDINGS:      Liver: Unremarkable. Spleen: Unremarkable. Pancreas: Unremarkable. Biliary: Unremarkable. Bowel: Diverticulosis. Bowel is normal in caliber. Normal appendix. No bowel  wall thickening or pneumatosis.      Peritoneum/ Retroperitoneum: Unremarkable. Lymph Nodes: Unremarkable.     Adrenal Glands: Unremarkable. Kidneys: Nonspecific perinephric stranding. Left renal cysts measuring 1.3 cm  anteriorly and 1.2 cm laterally at the interpolar kidney. No hydronephrosis.     Vessels: Moderate atherosclerosis. There is coarse calcification at the visceral  ostia.        PELVIS FINDINGS:      Bladder/ Pelvic Organs: Bladder is unremarkable. Fibroid uterus with an up to  4.1 cm subserosal fibroid at the right fundus.     Lung Base: Unremarkable.     Bones/Soft tissues: Lumbar facet hypertrophy and arthropathy. Narrowing  bilateral hip joint spaces. Degenerative disc disease.       IMPRESSION     No acute findings to explain patient's symptoms. Suboptimal evaluation of  pyelonephritis without IV contrast.  Diverticulosis. Moderate atherosclerosis with coarse calcification visceral ostia. Fibroid uterus. Discharge Medications:     Current Discharge Medication List      CONTINUE these medications which have NOT CHANGED    Details   gabapentin (NEURONTIN) 400 mg capsule Take 400 mg by mouth daily. tiZANidine (ZANAFLEX) 4 mg tablet Take 4 mg by mouth two (2) times daily as needed for Muscle Spasm(s). ezetimibe (ZETIA) 10 mg tablet Take 10 mg by mouth daily.       calcitRIOL (ROCALTROL) 0.25 mcg capsule Take 1 Cap by mouth daily. Qty: 30 Cap, Refills: 0      albuterol-ipratropium (DUO-NEB) 2.5 mg-0.5 mg/3 ml nebu 3 mL by Nebulization route every six (6) hours as needed for Wheezing. Qty: 30 Nebule, Refills: 0      atorvastatin (LIPITOR) 80 mg tablet Take 1 Tab by mouth daily. Qty: 90 Tab, Refills: 3    Associated Diagnoses: Dyslipidemia, goal LDL below 70      carvediloL (COREG) 25 mg tablet Take 1 Tab by mouth two (2) times daily (with meals). Qty: 180 Tab, Refills: 3    Associated Diagnoses: Essential hypertension; Chronic systolic congestive heart failure (HCC)      albuterol (PROVENTIL HFA, VENTOLIN HFA, PROAIR HFA) 90 mcg/actuation inhaler Take 2 Puffs by inhalation every four (4) hours as needed for Wheezing. Qty: 1 Inhaler, Refills: 0      aspirin 81 mg chewable tablet Take 2 Tabs by mouth daily. Qty: 60 Tab, Refills: 0    Associated Diagnoses: Coronary artery disease involving native coronary artery of native heart without angina pectoris      insulin detemir (LEVEMIR) 100 unit/mL injection 15 units daily for diabetes  Qty: 2 Vial, Refills: 0    Associated Diagnoses: Diabetes mellitus type 2, insulin dependent (HCC)      desloratadine (CLARINEX) 5 mg tablet Take 1 Tab by mouth daily as needed for Allergies. Qty: 30 Tab, Refills: 0    Associated Diagnoses: Environmental allergies      mometasone (NASONEX) 50 mcg/actuation nasal spray 2 Sprays by Both Nostrils route daily as needed. For allergies  Qty: 3 Container, Refills: 3    Associated Diagnoses: Environmental allergies      umeclidinium-vilanterol (ANORO ELLIPTA) 62.5-25 mcg/actuation inhaler Take 1 Puff by inhalation daily. For COPD  Qty: 3 Inhaler, Refills: 3    Comments: 9/22/15 Dr Huggins Cassette prescribed.  Will order via [facility] if available  Associated Diagnoses: Chronic obstructive pulmonary disease, unspecified COPD type ([facility]         STOP taking these medications       sulindac (CLINORIL) 200 mg tablet Comments:   Reason for Stopping: spironolactone (ALDACTONE) 25 mg tablet Comments:   Reason for Stopping:               Activity: activity as tolerated    Diet: Cardiac Diet    Wound Care: None needed    Follow-up: with PCP, Funmi Rausch MD in 7-10days    Minutes spent on discharge: >30 minutes spent coordinating this discharge (review instructions/follow-up, prescriptions, preparing report for sign off)    Donel Comment, DO

## 2022-06-29 NOTE — PROGRESS NOTES
RENAL DAILY PROGRESS NOTE    Patient: Marcus Wood               Sex: female          DOA: 6/25/2022  9:59 PM        YOB: 1950      Age:  81 y.o.        LOS:  LOS: 3 days     Subjective:     Marcus Wood is a 81 y.o.  who presents with Acute hyperkalemia [E87.5]  Breast cancer ([facility] [C50.919]  Rectal cancer ([facility] [C20]  Acute renal failure (ARF) ([facility] [N17.9].    Asked to evaluate for worsening renal failure,hyperkalemia,acidosis,was taking aldactone,entresto  Chief complains: Patient denies nausea, vomiting, chest pain, dizziness, shortness of breath or headache.  - Reviewed last 24 hrs events     Current Facility-Administered Medications   Medication Dose Route Frequency    melatonin tablet 3 mg  3 mg Oral QHS PRN    albuterol-ipratropium (DUO-NEB) 2.5 MG-0.5 MG/3 ML  3 mL Nebulization Q6H PRN    aspirin chewable tablet 162 mg  162 mg Oral DAILY    atorvastatin (LIPITOR) tablet 80 mg  80 mg Oral DAILY    carvediloL (COREG) tablet 25 mg  25 mg Oral BID WITH MEALS    insulin glargine (LANTUS) injection 15 Units  15 Units SubCUTAneous DAILY    ipratropium (ATROVENT) 0.02 % nebulizer solution 0.5 mg  0.5 mg Nebulization Q8H PRN    And    arformoteroL (BROVANA) neb solution 15 mcg  15 mcg Nebulization BID RT    sodium chloride (NS) flush 5-40 mL  5-40 mL IntraVENous Q8H    sodium chloride (NS) flush 5-40 mL  5-40 mL IntraVENous PRN    acetaminophen (TYLENOL) tablet 650 mg  650 mg Oral Q6H PRN    Or    acetaminophen (TYLENOL) suppository 650 mg  650 mg Rectal Q6H PRN    polyethylene glycol (MIRALAX) packet 17 g  17 g Oral DAILY PRN    ondansetron (ZOFRAN ODT) tablet 4 mg  4 mg Oral Q8H PRN    Or    ondansetron (ZOFRAN) injection 4 mg  4 mg IntraVENous Q6H PRN    enoxaparin (LOVENOX) injection 40 mg  40 mg SubCUTAneous DAILY    ezetimibe (ZETIA) tablet 10 mg  10 mg Oral DAILY    tiZANidine (ZANAFLEX) tablet 4 mg  4 mg Oral BID PRN    insulin lispro (HUMALOG) injection SubCUTAneous AC&HS    glucose chewable tablet 16 g  4 Tablet Oral PRN    glucagon (GLUCAGEN) injection 1 mg  1 mg IntraMUSCular PRN    dextrose 10% infusion 0-250 mL  0-250 mL IntraVENous PRN    nitroglycerin (NITROBID) 2 % ointment 1 Inch  1 Inch Topical Q6H PRN    hydrALAZINE (APRESOLINE) 20 mg/mL injection 10 mg  10 mg IntraVENous Q6H PRN    dextrose 10% infusion 0-250 mL  0-250 mL IntraVENous PRN    dextrose 10% infusion 0-250 mL  0-250 mL IntraVENous PRN       Objective:     Visit Vitals  /67   Pulse 81   Temp 98.5 °F (36.9 °C)   Resp 18   Ht 5' 4\" (1.626 m)   Wt 97.1 kg (214 lb)   SpO2 98%   BMI 36.73 kg/m²       Intake/Output Summary (Last 24 hours) at 6/29/2022 1255  Last data filed at 6/29/2022 1100  Gross per 24 hour   Intake 720 ml   Output 900 ml   Net -180 ml       Physical Examination:     GEN: AAO X 3, NAD  RS: Chest is bilateral equal, no wheezing / rales / crackles  CVS: S1-S2 heard  Abdomen: Soft, Non tender,   Extremities: No edema, no cyanosis, skin is warm on touch  CNS: Awake & follows commands,  HEENT: Head is atraumatic, PERRLA, conjunctiva pink & non icteric. No JVD or carotid bruit       Data Review:      Labs:     Hematology:   Recent Labs     06/29/22 0312 06/27/22  2345   WBC 7.1 9.5   HGB 10.4* 10.6*   HCT 32.6* 33.7*     Chemistry:   Recent Labs     06/29/22 0312 06/28/22  0651 06/27/22  2345 06/27/22  0615   BUN 45* 44* 51* 50*   CREA 1.47* 1.36* 1.63* 1.55*   CA 8.3* 8.6 8.2* 8.5   ALB  --  3.0* 2.9* 3.0*   K 4.8 4.5 4.3 4.6    140 139 141    105 107 108   CO2 28 29 27 26   PHOS  --  2.9 3.3 3.5   * 120* 123* 118*        Images:    XR (Most Recent). CXR reviewed by me and compared with previous CXR Results from Hospital Encounter encounter on 06/25/22    XR CHEST PA LAT    Narrative  Chest PA and lateral    INDICATION: Shortness of breath    COMPARISON: 3/22    FINDINGS:  Two views of the chest were obtained. Arthrosclerosis.  The lungs are clear.  Slight medial cardiophrenic angle prominence, similar to multiple prior studies  Cardiac silhouette is normal. Pulmonary vasculature is unremarkable. Osseous  structures are intact. Impression  No acute cardiopulmonary disease. No significant interval change. CT (Most Recent) Results from Hospital Encounter encounter on 06/25/22    CT ABD PELV WO CONT    Narrative  CT ABDOMEN AND PELVIS WITHOUT ENHANCEMENT    INDICATION: Syncope, dyspnea on exertion, chills, chronic intermittent diarrhea,  UTI, elevated potassium, creatinine, CVA tenderness, left greater than right. TECHNIQUE: Axial images obtained of the abdomen and pelvis without intravenous  contrast. Coronal and sagittal reformatted images of the abdomen and pelvis were  obtained. All CT scans at this facility are performed using dose optimization technique as  appropriate to a performed exam, to include automated exposure control,  adjustment of the mA and/or kV according to patient size (including appropriate  matching first site-specific examinations), or use of iterative reconstruction  technique. COMPARISON: 3/7/2022. Lack of intravenous contrast renders this study suboptimal for evaluating the  solid abdominal organs, vasculature and bowel. ABDOMEN FINDINGS:    Liver: Unremarkable. Spleen: Unremarkable. Pancreas: Unremarkable. Biliary: Unremarkable. Bowel: Diverticulosis. Bowel is normal in caliber. Normal appendix. No bowel  wall thickening or pneumatosis. Peritoneum/ Retroperitoneum: Unremarkable. Lymph Nodes: Unremarkable. Adrenal Glands: Unremarkable. Kidneys: Nonspecific perinephric stranding. Left renal cysts measuring 1.3 cm  anteriorly and 1.2 cm laterally at the interpolar kidney. No hydronephrosis. Vessels: Moderate atherosclerosis. There is coarse calcification at the visceral  ostia. PELVIS FINDINGS:    Bladder/ Pelvic Organs: Bladder is unremarkable.  Fibroid uterus with an up to  4.1 cm subserosal fibroid at the right fundus. Lung Base: Unremarkable. Bones/Soft tissues: Lumbar facet hypertrophy and arthropathy. Narrowing  bilateral hip joint spaces. Degenerative disc disease. Impression  No acute findings to explain patient's symptoms. Suboptimal evaluation of  pyelonephritis without IV contrast.  Diverticulosis. Moderate atherosclerosis with coarse calcification visceral ostia. Fibroid uterus. EKG Results for orders placed or performed in visit on 08/23/18   AMB POC EKG ROUTINE W/ 12 LEADS, INTER & REP     Status: None    Impression    See progress note. I have personally reviewed the old medical records and patient's labs    Plan / Recommendation:      1.acute renal failure,hyperkalemia,acidosis,related to aldactone and entersto ,? Clinoril. resolved,keep off these meds  2. crf stage 3,back to baseline,ok to discharge,will follow in the office    D/w Dr. Alicia Angulo MD  Nephrology  6/29/2022

## 2022-06-30 LAB
BACTERIA SPEC CULT: NORMAL
CC UR VC: NORMAL
SERVICE CMNT-IMP: NORMAL

## 2022-07-08 ENCOUNTER — APPOINTMENT (OUTPATIENT)
Dept: CT IMAGING | Age: 72
DRG: 193 | End: 2022-07-08
Attending: PHYSICIAN ASSISTANT
Payer: MEDICARE

## 2022-07-08 ENCOUNTER — APPOINTMENT (OUTPATIENT)
Dept: GENERAL RADIOLOGY | Age: 72
DRG: 193 | End: 2022-07-08
Attending: STUDENT IN AN ORGANIZED HEALTH CARE EDUCATION/TRAINING PROGRAM
Payer: MEDICARE

## 2022-07-08 ENCOUNTER — HOSPITAL ENCOUNTER (INPATIENT)
Age: 72
LOS: 3 days | Discharge: HOME | DRG: 193 | End: 2022-07-11
Attending: STUDENT IN AN ORGANIZED HEALTH CARE EDUCATION/TRAINING PROGRAM | Admitting: FAMILY MEDICINE
Payer: MEDICARE

## 2022-07-08 DIAGNOSIS — I10 ESSENTIAL HYPERTENSION: Chronic | ICD-10-CM

## 2022-07-08 DIAGNOSIS — Z79.4 DIABETES MELLITUS TYPE 2, INSULIN DEPENDENT (HCC): Chronic | ICD-10-CM

## 2022-07-08 DIAGNOSIS — E87.5 ACUTE HYPERKALEMIA: ICD-10-CM

## 2022-07-08 DIAGNOSIS — R09.1 PLEURISY: ICD-10-CM

## 2022-07-08 DIAGNOSIS — R79.89 ELEVATED SERUM CREATININE: ICD-10-CM

## 2022-07-08 DIAGNOSIS — J18.9 PNEUMONITIS: ICD-10-CM

## 2022-07-08 DIAGNOSIS — E11.9 DIABETES MELLITUS TYPE 2, INSULIN DEPENDENT (HCC): Chronic | ICD-10-CM

## 2022-07-08 DIAGNOSIS — I50.9 ACUTE CONGESTIVE HEART FAILURE, UNSPECIFIED HEART FAILURE TYPE (HCC): Primary | ICD-10-CM

## 2022-07-08 PROBLEM — I27.20 PULMONARY HYPERTENSION (HCC): Status: ACTIVE | Noted: 2022-07-08

## 2022-07-08 LAB
ANION GAP SERPL CALC-SCNC: 5 MMOL/L (ref 3–18)
ATRIAL RATE: 94 BPM
BASOPHILS # BLD: 0 K/UL (ref 0–0.1)
BASOPHILS NFR BLD: 0 % (ref 0–2)
BNP SERPL-MCNC: 3952 PG/ML (ref 0–900)
BUN SERPL-MCNC: 23 MG/DL (ref 7–18)
BUN/CREAT SERPL: 13 (ref 12–20)
CALCIUM SERPL-MCNC: 9.3 MG/DL (ref 8.5–10.1)
CALCULATED P AXIS, ECG09: 78 DEGREES
CALCULATED R AXIS, ECG10: 30 DEGREES
CALCULATED T AXIS, ECG11: 101 DEGREES
CHLORIDE SERPL-SCNC: 109 MMOL/L (ref 100–111)
CO2 SERPL-SCNC: 25 MMOL/L (ref 21–32)
CREAT SERPL-MCNC: 1.81 MG/DL (ref 0.6–1.3)
D DIMER PPP FEU-MCNC: 0.81 UG/ML(FEU)
DIAGNOSIS, 93000: NORMAL
DIFFERENTIAL METHOD BLD: ABNORMAL
EOSINOPHIL # BLD: 0.1 K/UL (ref 0–0.4)
EOSINOPHIL NFR BLD: 1 % (ref 0–5)
ERYTHROCYTE [DISTWIDTH] IN BLOOD BY AUTOMATED COUNT: 16.1 % (ref 11.6–14.5)
FLUAV RNA SPEC QL NAA+PROBE: NOT DETECTED
FLUBV RNA SPEC QL NAA+PROBE: NOT DETECTED
GLUCOSE BLD STRIP.AUTO-MCNC: 219 MG/DL (ref 70–110)
GLUCOSE SERPL-MCNC: 141 MG/DL (ref 74–99)
HCT VFR BLD AUTO: 33.2 % (ref 35–45)
HGB BLD-MCNC: 9.9 G/DL (ref 12–16)
IMM GRANULOCYTES # BLD AUTO: 0 K/UL (ref 0–0.04)
IMM GRANULOCYTES NFR BLD AUTO: 0 % (ref 0–0.5)
LYMPHOCYTES # BLD: 1.4 K/UL (ref 0.9–3.6)
LYMPHOCYTES NFR BLD: 18 % (ref 21–52)
MAGNESIUM SERPL-MCNC: 2 MG/DL (ref 1.6–2.6)
MCH RBC QN AUTO: 27 PG (ref 24–34)
MCHC RBC AUTO-ENTMCNC: 29.8 G/DL (ref 31–37)
MCV RBC AUTO: 90.7 FL (ref 78–100)
MONOCYTES # BLD: 0.6 K/UL (ref 0.05–1.2)
MONOCYTES NFR BLD: 8 % (ref 3–10)
NEUTS SEG # BLD: 5.8 K/UL (ref 1.8–8)
NEUTS SEG NFR BLD: 73 % (ref 40–73)
NRBC # BLD: 0.08 K/UL (ref 0–0.01)
NRBC BLD-RTO: 1 PER 100 WBC
P-R INTERVAL, ECG05: 166 MS
PLATELET # BLD AUTO: 331 K/UL (ref 135–420)
PMV BLD AUTO: 9.5 FL (ref 9.2–11.8)
POTASSIUM SERPL-SCNC: 5.8 MMOL/L (ref 3.5–5.5)
Q-T INTERVAL, ECG07: 346 MS
QRS DURATION, ECG06: 76 MS
QTC CALCULATION (BEZET), ECG08: 432 MS
RBC # BLD AUTO: 3.66 M/UL (ref 4.2–5.3)
SARS-COV-2, COV2: NOT DETECTED
SODIUM SERPL-SCNC: 139 MMOL/L (ref 136–145)
TROPONIN-HIGH SENSITIVITY: 24 NG/L (ref 0–54)
VENTRICULAR RATE, ECG03: 94 BPM
WBC # BLD AUTO: 8 K/UL (ref 4.6–13.2)

## 2022-07-08 PROCEDURE — 83735 ASSAY OF MAGNESIUM: CPT

## 2022-07-08 PROCEDURE — 74011250636 HC RX REV CODE- 250/636: Performed by: PHYSICIAN ASSISTANT

## 2022-07-08 PROCEDURE — 99223 1ST HOSP IP/OBS HIGH 75: CPT | Performed by: FAMILY MEDICINE

## 2022-07-08 PROCEDURE — 65270000046 HC RM TELEMETRY

## 2022-07-08 PROCEDURE — 94640 AIRWAY INHALATION TREATMENT: CPT

## 2022-07-08 PROCEDURE — 93005 ELECTROCARDIOGRAM TRACING: CPT

## 2022-07-08 PROCEDURE — APPSS45 APP SPLIT SHARED TIME 31-45 MINUTES: Performed by: NURSE PRACTITIONER

## 2022-07-08 PROCEDURE — 71045 X-RAY EXAM CHEST 1 VIEW: CPT

## 2022-07-08 PROCEDURE — C9113 INJ PANTOPRAZOLE SODIUM, VIA: HCPCS | Performed by: STUDENT IN AN ORGANIZED HEALTH CARE EDUCATION/TRAINING PROGRAM

## 2022-07-08 PROCEDURE — 74011636637 HC RX REV CODE- 636/637: Performed by: NURSE PRACTITIONER

## 2022-07-08 PROCEDURE — 83880 ASSAY OF NATRIURETIC PEPTIDE: CPT

## 2022-07-08 PROCEDURE — 85379 FIBRIN DEGRADATION QUANT: CPT

## 2022-07-08 PROCEDURE — 74011000250 HC RX REV CODE- 250: Performed by: STUDENT IN AN ORGANIZED HEALTH CARE EDUCATION/TRAINING PROGRAM

## 2022-07-08 PROCEDURE — 74011000250 HC RX REV CODE- 250: Performed by: PHYSICIAN ASSISTANT

## 2022-07-08 PROCEDURE — 80048 BASIC METABOLIC PNL TOTAL CA: CPT

## 2022-07-08 PROCEDURE — 99285 EMERGENCY DEPT VISIT HI MDM: CPT

## 2022-07-08 PROCEDURE — 87636 SARSCOV2 & INF A&B AMP PRB: CPT

## 2022-07-08 PROCEDURE — 96375 TX/PRO/DX INJ NEW DRUG ADDON: CPT

## 2022-07-08 PROCEDURE — 74011250637 HC RX REV CODE- 250/637: Performed by: STUDENT IN AN ORGANIZED HEALTH CARE EDUCATION/TRAINING PROGRAM

## 2022-07-08 PROCEDURE — 85025 COMPLETE CBC W/AUTO DIFF WBC: CPT

## 2022-07-08 PROCEDURE — 84484 ASSAY OF TROPONIN QUANT: CPT

## 2022-07-08 PROCEDURE — 82962 GLUCOSE BLOOD TEST: CPT

## 2022-07-08 PROCEDURE — 96374 THER/PROPH/DIAG INJ IV PUSH: CPT

## 2022-07-08 PROCEDURE — 71275 CT ANGIOGRAPHY CHEST: CPT

## 2022-07-08 PROCEDURE — 74011000250 HC RX REV CODE- 250: Performed by: FAMILY MEDICINE

## 2022-07-08 PROCEDURE — 74011000636 HC RX REV CODE- 636: Performed by: STUDENT IN AN ORGANIZED HEALTH CARE EDUCATION/TRAINING PROGRAM

## 2022-07-08 PROCEDURE — 74011250637 HC RX REV CODE- 250/637: Performed by: PHYSICIAN ASSISTANT

## 2022-07-08 PROCEDURE — 74011250636 HC RX REV CODE- 250/636: Performed by: STUDENT IN AN ORGANIZED HEALTH CARE EDUCATION/TRAINING PROGRAM

## 2022-07-08 PROCEDURE — 74011000250 HC RX REV CODE- 250: Performed by: NURSE PRACTITIONER

## 2022-07-08 RX ORDER — CARVEDILOL 25 MG/1
25 TABLET ORAL 2 TIMES DAILY WITH MEALS
Status: DISCONTINUED | OUTPATIENT
Start: 2022-07-08 | End: 2022-07-11 | Stop reason: HOSPADM

## 2022-07-08 RX ORDER — ONDANSETRON 2 MG/ML
4 INJECTION INTRAMUSCULAR; INTRAVENOUS
Status: DISCONTINUED | OUTPATIENT
Start: 2022-07-08 | End: 2022-07-11 | Stop reason: HOSPADM

## 2022-07-08 RX ORDER — IPRATROPIUM BROMIDE AND ALBUTEROL SULFATE 2.5; .5 MG/3ML; MG/3ML
3 SOLUTION RESPIRATORY (INHALATION)
Status: COMPLETED | OUTPATIENT
Start: 2022-07-08 | End: 2022-07-08

## 2022-07-08 RX ORDER — GUAIFENESIN 100 MG/5ML
324 LIQUID (ML) ORAL
Status: COMPLETED | OUTPATIENT
Start: 2022-07-08 | End: 2022-07-08

## 2022-07-08 RX ORDER — IPRATROPIUM BROMIDE AND ALBUTEROL SULFATE 2.5; .5 MG/3ML; MG/3ML
3 SOLUTION RESPIRATORY (INHALATION)
Status: DISCONTINUED | OUTPATIENT
Start: 2022-07-08 | End: 2022-07-08 | Stop reason: CLARIF

## 2022-07-08 RX ORDER — HYDRALAZINE HYDROCHLORIDE 100 MG/1
TABLET, FILM COATED ORAL
COMMUNITY
Start: 2022-06-30 | End: 2022-08-08

## 2022-07-08 RX ORDER — INSULIN LISPRO 100 [IU]/ML
INJECTION, SOLUTION INTRAVENOUS; SUBCUTANEOUS
Status: DISCONTINUED | OUTPATIENT
Start: 2022-07-08 | End: 2022-07-11 | Stop reason: HOSPADM

## 2022-07-08 RX ORDER — GUAIFENESIN 100 MG/5ML
81 LIQUID (ML) ORAL DAILY
Status: DISCONTINUED | OUTPATIENT
Start: 2022-07-09 | End: 2022-07-11 | Stop reason: HOSPADM

## 2022-07-08 RX ORDER — IPRATROPIUM BROMIDE AND ALBUTEROL 20; 100 UG/1; UG/1
SPRAY, METERED RESPIRATORY (INHALATION)
COMMUNITY
Start: 2022-06-23 | End: 2022-07-11

## 2022-07-08 RX ORDER — ACETAMINOPHEN 325 MG/1
650 TABLET ORAL
Status: DISCONTINUED | OUTPATIENT
Start: 2022-07-08 | End: 2022-07-11 | Stop reason: HOSPADM

## 2022-07-08 RX ORDER — BUMETANIDE 0.25 MG/ML
1.5 INJECTION INTRAMUSCULAR; INTRAVENOUS
Status: ACTIVE | OUTPATIENT
Start: 2022-07-08 | End: 2022-07-09

## 2022-07-08 RX ORDER — ACETAMINOPHEN 650 MG/1
650 SUPPOSITORY RECTAL
Status: DISCONTINUED | OUTPATIENT
Start: 2022-07-08 | End: 2022-07-11 | Stop reason: HOSPADM

## 2022-07-08 RX ORDER — ALBUTEROL SULFATE 0.83 MG/ML
2.5 SOLUTION RESPIRATORY (INHALATION)
Status: DISCONTINUED | OUTPATIENT
Start: 2022-07-08 | End: 2022-07-08

## 2022-07-08 RX ORDER — MAGNESIUM SULFATE 100 %
4 CRYSTALS MISCELLANEOUS AS NEEDED
Status: DISCONTINUED | OUTPATIENT
Start: 2022-07-08 | End: 2022-07-11 | Stop reason: HOSPADM

## 2022-07-08 RX ORDER — SODIUM CHLORIDE 0.9 % (FLUSH) 0.9 %
5-40 SYRINGE (ML) INJECTION EVERY 8 HOURS
Status: DISCONTINUED | OUTPATIENT
Start: 2022-07-08 | End: 2022-07-11 | Stop reason: HOSPADM

## 2022-07-08 RX ORDER — DEXTROSE MONOHYDRATE 100 MG/ML
0-250 INJECTION, SOLUTION INTRAVENOUS AS NEEDED
Status: DISCONTINUED | OUTPATIENT
Start: 2022-07-08 | End: 2022-07-11 | Stop reason: HOSPADM

## 2022-07-08 RX ORDER — IPRATROPIUM BROMIDE AND ALBUTEROL SULFATE 2.5; .5 MG/3ML; MG/3ML
3 SOLUTION RESPIRATORY (INHALATION)
Status: DISCONTINUED | OUTPATIENT
Start: 2022-07-09 | End: 2022-07-11 | Stop reason: HOSPADM

## 2022-07-08 RX ORDER — ONDANSETRON 4 MG/1
4 TABLET, ORALLY DISINTEGRATING ORAL
Status: DISCONTINUED | OUTPATIENT
Start: 2022-07-08 | End: 2022-07-11 | Stop reason: HOSPADM

## 2022-07-08 RX ORDER — IPRATROPIUM BROMIDE 0.5 MG/2.5ML
0.5 SOLUTION RESPIRATORY (INHALATION)
Status: DISCONTINUED | OUTPATIENT
Start: 2022-07-08 | End: 2022-07-08

## 2022-07-08 RX ORDER — EZETIMIBE 10 MG/1
10 TABLET ORAL DAILY
Status: DISCONTINUED | OUTPATIENT
Start: 2022-07-09 | End: 2022-07-11 | Stop reason: HOSPADM

## 2022-07-08 RX ORDER — ARFORMOTEROL TARTRATE 15 UG/2ML
15 SOLUTION RESPIRATORY (INHALATION)
Status: DISCONTINUED | OUTPATIENT
Start: 2022-07-08 | End: 2022-07-11 | Stop reason: HOSPADM

## 2022-07-08 RX ORDER — BUMETANIDE 0.25 MG/ML
0.5 INJECTION INTRAMUSCULAR; INTRAVENOUS
Status: COMPLETED | OUTPATIENT
Start: 2022-07-08 | End: 2022-07-08

## 2022-07-08 RX ORDER — SODIUM CHLORIDE 0.9 % (FLUSH) 0.9 %
5-40 SYRINGE (ML) INJECTION AS NEEDED
Status: DISCONTINUED | OUTPATIENT
Start: 2022-07-08 | End: 2022-07-11 | Stop reason: HOSPADM

## 2022-07-08 RX ORDER — HEPARIN SODIUM 5000 [USP'U]/ML
5000 INJECTION, SOLUTION INTRAVENOUS; SUBCUTANEOUS EVERY 8 HOURS
Status: DISCONTINUED | OUTPATIENT
Start: 2022-07-08 | End: 2022-07-11 | Stop reason: HOSPADM

## 2022-07-08 RX ORDER — POLYETHYLENE GLYCOL 3350 17 G/17G
17 POWDER, FOR SOLUTION ORAL DAILY PRN
Status: DISCONTINUED | OUTPATIENT
Start: 2022-07-08 | End: 2022-07-11 | Stop reason: HOSPADM

## 2022-07-08 RX ORDER — GUAIFENESIN 100 MG/5ML
162 LIQUID (ML) ORAL DAILY
Status: DISCONTINUED | OUTPATIENT
Start: 2022-07-09 | End: 2022-07-08

## 2022-07-08 RX ORDER — ATORVASTATIN CALCIUM 40 MG/1
80 TABLET, FILM COATED ORAL DAILY
Status: DISCONTINUED | OUTPATIENT
Start: 2022-07-09 | End: 2022-07-11 | Stop reason: HOSPADM

## 2022-07-08 RX ADMIN — SODIUM ZIRCONIUM CYCLOSILICATE 10 G: 10 POWDER, FOR SUSPENSION ORAL at 11:41

## 2022-07-08 RX ADMIN — BUMETANIDE 0.5 MG: 0.25 INJECTION INTRAMUSCULAR; INTRAVENOUS at 11:11

## 2022-07-08 RX ADMIN — IPRATROPIUM BROMIDE AND ALBUTEROL SULFATE 3 ML: .5; 2.5 SOLUTION RESPIRATORY (INHALATION) at 11:23

## 2022-07-08 RX ADMIN — ASPIRIN 81 MG CHEWABLE TABLET 324 MG: 81 TABLET CHEWABLE at 11:00

## 2022-07-08 RX ADMIN — SODIUM CHLORIDE, PRESERVATIVE FREE 10 ML: 5 INJECTION INTRAVENOUS at 22:00

## 2022-07-08 RX ADMIN — SODIUM CHLORIDE 40 MG: 9 INJECTION INTRAMUSCULAR; INTRAVENOUS; SUBCUTANEOUS at 11:06

## 2022-07-08 RX ADMIN — METHYLPREDNISOLONE SODIUM SUCCINATE 125 MG: 125 INJECTION, POWDER, FOR SOLUTION INTRAMUSCULAR; INTRAVENOUS at 11:01

## 2022-07-08 RX ADMIN — IOPAMIDOL 72 ML: 755 INJECTION, SOLUTION INTRAVENOUS at 13:52

## 2022-07-08 RX ADMIN — Medication 4 UNITS: at 21:41

## 2022-07-08 RX ADMIN — IPRATROPIUM BROMIDE AND ALBUTEROL SULFATE 3 ML: .5; 3 SOLUTION RESPIRATORY (INHALATION) at 11:17

## 2022-07-08 RX ADMIN — ALBUTEROL SULFATE 2.5 MG: 2.5 SOLUTION RESPIRATORY (INHALATION) at 21:42

## 2022-07-08 RX ADMIN — IPRATROPIUM BROMIDE AND ALBUTEROL SULFATE 3 ML: .5; 2.5 SOLUTION RESPIRATORY (INHALATION) at 11:13

## 2022-07-08 RX ADMIN — ARFORMOTEROL TARTRATE 15 MCG: 15 SOLUTION RESPIRATORY (INHALATION) at 21:42

## 2022-07-08 NOTE — CONSULTS
Consult Note  Consult requested by: Dr. Nicola Carvajal is a 81 y.o. female BLACK/ who is being seen on consult for acute kidney injury   Chief Complaint   Patient presents with    Shortness of Breath    Chest Pain     Admission diagnosis: Short of breath    77-year-old female with past medical history of diabetes, hypertension, congestive heart failure, CKD admitted for short of breath, following for renal failure  Impression & Plan:   IMPRESSION:   Acute on chronic kidney injury, suspect cardiorenal with presentation, CTA negative for PE  CKD stage IV with proteinuria Baseline creatinine around 1.3 to 1.5 mg per DL  Heart failure with preserved ejection fraction, no pulmonary hypertension  Lower extremity edema  Diabetes  Secondary hyperparathyroidism CKD   PLAN:   She will likely benefit from diuretics she received Bumex in the ER. I would hold further diuretics unless any emergency as she has received IV contrast.  High risk of contrast-induced nephropathy. Monitor for urine retention, strict I's and O documentation  Adjust medication per current functional status. Discussed with ER colleague. Thank you very much for allowing me to participate in the care of this patient. We will continue to monitor with you and make recommendations as needed.         HPI: 77-year-old female with past medical history of hypertension, diabetes, congestive heart failure came to the emergency room with short of breath. In emergency room her work-up shows congestive chest x-ray, elevated creatinine, CTA negative for PE but suggestive of interstitial edema. She was recently admitted for CARA and hyperkalemia. Her diuretic has been discontinued. She admits her leg swelling has been getting worse over last few days. She received Bumex 2 mg IV in the emergency room. During my visit she denies for any chest pain admit her breathing gradually improving but still not at baseline.     Denies for any difficulty in passing urine. Past Medical History:   Diagnosis Date    Abnormal WBC count 2016    Anemia     Bilateral shoulder pain 2016    Chondromalacia of both patellae     COPD (chronic obstructive pulmonary disease) ([facility] 2015    mild-mod dz; Dr Mariam Scott    Diabetes [facility])     Diabetic eye exam ([facility]     [facility]     Dyslipidemia     Gout     Heart [facility] ([facility] 10/18/2019    History of echocardiogram 2014    Mild LVE. EF 40%. Mild, diffuse hypk. Mild LAE.   Mild MR.      Hypercholesteremia 14    Hypertension     Noncompliance with medications 2016    Obesity     Orthostatic hypotension 2016    Osteoarthritis of both knees     Pain management 2016    Dr. Eugenia Trejo Popliteal cyst, bilateral      Vitamin D deficiency 10/16/14      Past Surgical History:   Procedure Laterality Date    HX HEART CATHETERIZATION      HX TUBAL LIGATION         Social History     Socioeconomic History    Marital status:      Spouse name: Not on file    Number of children: 3    Years of education: Not on file    Highest education level: Not on file   Occupational History    Occupation: retired   Tobacco Use    Smoking status: Former Smoker     Packs/day: 0.25     Years: 48.00     Pack years: 12.00     Types: Cigarettes     Quit date: 2018     Years since quittin.1    Smokeless tobacco: Never Used   Substance and Sexual Activity    Alcohol use: No     Alcohol/week: 0.0 standard drinks    Drug use: No    Sexual activity: Yes     Partners: Male   Other Topics Concern     Service No    Blood Transfusions No    Caffeine Concern No    Occupational Exposure No    Hobby Hazards No    Sleep Concern No    Stress Concern No    Weight Concern No    Special Diet No    Back Care No    Exercise No    Bike Helmet No    Seat Belt Yes    Self-Exams Yes   Social History Narrative    Not on file Social Determinants of Health     Financial Resource Strain:     Difficulty of Paying Living Expenses: Not on file   Food Insecurity:     Worried About Running Out of Food in the Last Year: Not on file    Ania of Food in the Last Year: Not on file   Transportation Needs:     Lack of Transportation (Medical): Not on file    Lack of Transportation (Non-Medical): Not on file   Physical Activity:     Days of Exercise per Week: Not on file    Minutes of Exercise per Session: Not on file   Stress:     Feeling of Stress : Not on file   Social Connections:     Frequency of Communication with Friends and Family: Not on file    Frequency of Social Gatherings with Friends and Family: Not on file    Attends Druze Services: Not on file    Active Member of [address] [place] [address] [employer] or Organizations: Not on file    Attends Club or Organization Meetings: Not on file    Marital Status: Not on file   Intimate Partner Violence:     Fear of Current or Ex-Partner: Not on file    Emotionally Abused: Not on file    Physically Abused: Not on file    Sexually Abused: Not on file   Housing Stability:     Unable to Pay for Housing in the Last Year: Not on file    Number of [place] in the Last Year: Not on file    Unstable Housing in the Last Year: Not on file       Family History   Problem Relation Age of Onset    Diabetes Mother     Hypertension Mother     Hypertension Father     Kidney Disease Maternal Aunt 50        Dialysis     No Known Allergies     Home Medications:     Prior to Admission Medications   Prescriptions Last Dose Informant Patient Reported? Taking? albuterol (PROVENTIL HFA, VENTOLIN HFA, PROAIR HFA) 90 mcg/actuation inhaler   No No   Sig: Take 2 Puffs by inhalation every four (4) hours as needed for Wheezing. albuterol-ipratropium (DUO-NEB) 2.5 mg-0.5 mg/3 ml nebu   No No   Sig: 3 mL by Nebulization route every six (6) hours as needed for Wheezing.    aspirin 81 mg chewable tablet   No No   Sig: Take 2 Tabs by mouth daily. atorvastatin (LIPITOR) 80 mg tablet   No No   Sig: Take 1 Tab by mouth daily. calcitRIOL (ROCALTROL) 0.25 mcg capsule   No No   Sig: Take 1 Cap by mouth daily. carvediloL (COREG) 25 mg tablet   No No   Sig: Take 1 Tab by mouth two (2) times daily (with meals). desloratadine (CLARINEX) 5 mg tablet   No No   Sig: Take 1 Tab by mouth daily as needed for Allergies. ezetimibe (ZETIA) 10 mg tablet   Yes No   Sig: Take 10 mg by mouth daily. gabapentin (NEURONTIN) 400 mg capsule   Yes No   Sig: Take 400 mg by mouth daily. insulin detemir (LEVEMIR) 100 unit/mL injection   No No   Sig: 15 units daily for diabetes   mometasone (NASONEX) 50 mcg/actuation nasal spray   No No   Si Sprays by Both Nostrils route daily as needed. For allergies   tiZANidine (ZANAFLEX) 4 mg tablet   Yes No   Sig: Take 4 mg by mouth two (2) times daily as needed for Muscle Spasm(s). umeclidinium-vilanterol (ANORO ELLIPTA) 62.5-25 mcg/actuation inhaler   No No   Sig: Take 1 Puff by inhalation daily. For COPD      Facility-Administered Medications: None       Current Facility-Administered Medications   Medication Dose Route Frequency    bumetanide (BUMEX) injection 1.5 mg  1.5 mg IntraVENous NOW     Current Outpatient Medications   Medication Sig    gabapentin (NEURONTIN) 400 mg capsule Take 400 mg by mouth daily.  tiZANidine (ZANAFLEX) 4 mg tablet Take 4 mg by mouth two (2) times daily as needed for Muscle Spasm(s).  ezetimibe (ZETIA) 10 mg tablet Take 10 mg by mouth daily.  calcitRIOL (ROCALTROL) 0.25 mcg capsule Take 1 Cap by mouth daily.  albuterol-ipratropium (DUO-NEB) 2.5 mg-0.5 mg/3 ml nebu 3 mL by Nebulization route every six (6) hours as needed for Wheezing.  atorvastatin (LIPITOR) 80 mg tablet Take 1 Tab by mouth daily.  carvediloL (COREG) 25 mg tablet Take 1 Tab by mouth two (2) times daily (with meals).     albuterol (PROVENTIL HFA, VENTOLIN HFA, PROAIR HFA) 90 mcg/actuation inhaler Take 2 Puffs by inhalation every four (4) hours as needed for Wheezing.  aspirin 81 mg chewable tablet Take 2 Tabs by mouth daily.  insulin detemir (LEVEMIR) 100 unit/mL injection 15 units daily for diabetes    desloratadine (CLARINEX) 5 mg tablet Take 1 Tab by mouth daily as needed for Allergies.  mometasone (NASONEX) 50 mcg/actuation nasal spray 2 Sprays by Both Nostrils route daily as needed. For allergies    umeclidinium-vilanterol (ANORO ELLIPTA) 62.5-25 mcg/actuation inhaler Take 1 Puff by inhalation daily. For COPD       Review of Systems:     Complete 10-point review of systems were obtained and discussed in length  with the patient. Complete review of systems was negative/unremarkable  except as mentioned in HPI section. Data Review:    Labs: Results:       Chemistry Recent Labs     07/08/22  0953   *      K 5.8*      CO2 25   BUN 23*   CREA 1.81*   CA 9.3   AGAP 5   BUCR 13      CBC w/Diff Recent Labs     07/08/22  0953   WBC 8.0   RBC 3.66*   HGB 9.9*   HCT 33.2*      GRANS 73   LYMPH 18*   EOS 1      Coagulation No results for input(s): PTP, INR, APTT, INREXT in the last 72 hours. Iron/Ferritin No results for input(s): IRON in the last 72 hours. No lab exists for component: TIBCCALC   BNP No results for input(s): BNPP in the last 72 hours. Cardiac Enzymes No results for input(s): CPK, CKND1, GODFREY in the last 72 hours. No lab exists for component: CKRMB, TROIP   Liver Enzymes No results for input(s): TP, ALB, TBIL, AP in the last 72 hours.     No lab exists for component: SGOT, GPT, DBIL   Thyroid Studies Lab Results   Component Value Date/Time    TSH 0.21 (L) 03/19/2021 04:00 AM         EKG: Sinus    Physical Assessment:     Visit Vitals  BP (!) 166/112   Pulse 95   Temp 99.3 °F (37.4 °C)   Resp 20   SpO2 92%     Weight change:   No intake or output data in the 24 hours ending 07/08/22 1432  Physical Exam:   General: comfortable, no acute distress HEENT sclera anicteric, supple neck, no thyromegaly  CVS: S1S2 heard,  no rub  RS: + air entry b/l,   Abd: Soft, Non tender, Not distended, Positive bowel sounds, no organomegaly, no CVA / supra pubic tenderness  Neuro: non focal, awake, alert , CN II-XII are grossly intact  Extrm: ++edema, no cyanosis, clubbing   Skin: no visible  Rash  Musculoskeletal: No gross joints or bone deformities     Procedures/imaging: see electronic medical records for all procedures, Xrays and details which were not copied into this note but were reviewed prior to creation of Ellie Freedman MD  July 8, 2022  [facility]  Office [phone]

## 2022-07-08 NOTE — H&P
History & Physical    Patient: Benjamín Whitehead MRN: [id number]  CSN: [id number]    YOB: 1950  Age: 81 y.o. Sex: female      DOA: 7/8/2022  CC:SOB anc hest pain    PCP: Samuel Topete MD       HPI:     Benjamín Whitehead is a 81 y.o. female who presents with SOB, chest pain, and leg swelling. SOB worsening over the last 3 days. Use of inhaler with minimum to no relief. No home oxygen. Recent discharge 6/29/22 for hyperkalemia and CARA. Discharged home to follow up with nephrology as OP. In ER potassium 5.8 and creatinine 1.81. BNP elevated 3,952. Troponin level is normal x 1. EKG no change from previous study. CTA chest negative for PE. Possible interstitial pneumonitis and or edema. Nephrology consulted by ER.   bumex 1 mg x 1 dose    ROS  GENERAL: no weight loss, no falls. HEENT: No change in vision, no earache, no tinnitus, no sore throat or sinus congestion. NECK: No pain or stiffness. PULMONARY: SOB  Cardiovascular: chest pain  GASTROINTESTINAL: No abdominal pain, no nausea, no vomiting or diarrhea, no melena or bright red blood per rectum. GENITOURINARY: No urinary frequency, no urgency, no hesitancy or dysuria. MUSCULOSKELETAL: leg swelling and foot swelling   DERMATOLOGIC: No rash, no itching, no lesions. ENDOCRINE: No polyuria, no polydipsia, no heat or cold intolerance. No recent change in weight. HEMATOLOGICAL: No anemia or easy bruising or bleeding. NEUROLOGIC: No headache, no seizures, no numbness, no tingling or weakness.      Past Medical History:   Diagnosis Date    Abnormal WBC count 5/17/2016    Anemia     Bilateral shoulder pain 9/27/2016    Chondromalacia of both patellae     COPD (chronic obstructive pulmonary disease) ([facility] [address] 9/2015    mild-mod dz; Dr Da Sheffield    Diabetes [facility]) 2013    Diabetic eye exam ([facility] [address] 2016    Dilated cardiomyopathy ([facility] [address]     Dyslipidemia     Gout     Heart attack ([facility] [address] 10/18/2019    History of echocardiogram 2014    Mild LVE. EF 40%. Mild, diffuse hypk. Mild LAE. Mild MR.      Hypercholesteremia 14    Hypertension 2000    Noncompliance with medications 2016    Obesity     Orthostatic hypotension 2016    Osteoarthritis of both knees     Pain management 2016    Dr. Terrie Bernardo Popliteal cyst, bilateral      Vitamin D deficiency 10/16/14       Past Surgical History:   Procedure Laterality Date    HX HEART CATHETERIZATION      HX TUBAL LIGATION         Family History   Problem Relation Age of Onset    Diabetes Mother     Hypertension Mother     Hypertension Father     Kidney Disease Maternal Aunt 48        Dialysis       Social History     Socioeconomic History    Marital status:     Number of children: 3   Occupational History    Occupation: retired   Tobacco Use    Smoking status: Former Smoker     Packs/day: 0.25     Years: 48.00     Pack years: 12.00     Types: Cigarettes     Quit date: 2018     Years since quittin.1    Smokeless tobacco: Never Used   Substance and Sexual Activity    Alcohol use: No     Alcohol/week: 0.0 standard drinks    Drug use: No    Sexual activity: Yes     Partners: Male   Other Topics Concern     Service No    Blood Transfusions No    Caffeine Concern No    Occupational Exposure No    Hobby Hazards No    Sleep Concern No    Stress Concern No    Weight Concern No    Special Diet No    Back Care No    Exercise No    Bike Helmet No    Seat Belt Yes    Self-Exams Yes       Prior to Admission medications    Medication Sig Start Date End Date Taking? Authorizing Provider   gabapentin (NEURONTIN) 400 mg capsule Take 400 mg by mouth daily. Other, MD Tg   tiZANidine (ZANAFLEX) 4 mg tablet Take 4 mg by mouth two (2) times daily as needed for Muscle Spasm(s). Provider, Historical   ezetimibe (ZETIA) 10 mg tablet Take 10 mg by mouth daily.     Provider, Historical   calcitRIOL (ROCALTROL) 0.25 mcg capsule Take 1 Cap by mouth daily. 3/24/21   Kylie Diallo MD   albuterol-ipratropium (DUO-NEB) 2.5 mg-0.5 mg/3 ml nebu 3 mL by Nebulization route every six (6) hours as needed for Wheezing. 3/23/21   Kylie Diallo MD   atorvastatin (LIPITOR) 80 mg tablet Take 1 Tab by mouth daily. 1/25/21   Anna Ricketts MD   carvediloL (COREG) 25 mg tablet Take 1 Tab by mouth two (2) times daily (with meals). 1/25/21   Anna Ricketts MD   albuterol (PROVENTIL HFA, VENTOLIN HFA, PROAIR HFA) 90 mcg/actuation inhaler Take 2 Puffs by inhalation every four (4) hours as needed for Wheezing. 9/30/19   Ruel Goel MD   aspirin 81 mg chewable tablet Take 2 Tabs by mouth daily. 7/19/19   Kendra Rabago NP   insulin detemir (LEVEMIR) 100 unit/mL injection 15 units daily for diabetes 7/20/17   Iqra Irizarry DNP   desloratadine (CLARINEX) 5 mg tablet Take 1 Tab by mouth daily as needed for Allergies. 4/26/17   Walter Irizaryr DNP   mometasone (NASONEX) 50 mcg/actuation nasal spray 2 Sprays by Both Nostrils route daily as needed. For allergies 4/26/17   Mary Irizarry DNP   umeclidinium-vilanterol (ANORO ELLIPTA) 62.5-25 mcg/actuation inhaler Take 1 Puff by inhalation daily. For COPD 4/26/17   Guillermina Bernardo DNP       No Known Allergies           Physical Exam:      Visit Vitals  BP (!) 166/112   Pulse 95   Temp 99.3 °F (37.4 °C)   Resp 20   SpO2 92%       Physical Exam:  General:         Alert, cooperative, no acute distress    HEENT:           NC, Atraumatic. PERRLA, anicteric sclerae. Lungs:            scattered rhonchi and wheeze t/o  Heart:              RRR, No murmur, No Rubs, No Gallops  Abdomen:      Soft, Non distended, Non tender. +Bowel sounds, no HSM  Extremities:   BLE   Psych:              Good insight. Not anxious or agitated. Neurologic:     Alert and oriented X 4.   No acute neurological deficits    Lab/Data Review:  Labs: Results:       Chemistry Recent Labs 07/08/22  0953   *      K 5.8*      CO2 25   BUN 23*   CREA 1.81*   CA 9.3   AGAP 5   BUCR 13      CBC w/Diff Recent Labs     07/08/22  0953   WBC 8.0   RBC 3.66*   HGB 9.9*   HCT 33.2*      GRANS 73   LYMPH 18*   EOS 1      Coagulation No results for input(s): PTP, INR, APTT, INREXT in the last 72 hours. Iron/Ferritin No results for input(s): IRON in the last 72 hours. No lab exists for component: TIBCCALC   BNP No results for input(s): BNPP in the last 72 hours. Cardiac Enzymes No results for input(s): CPK, CKND1, GODFREY in the last 72 hours. No lab exists for component: CKRMB, TROIP   Liver Enzymes No results for input(s): TP, ALB, TBIL, AP in the last 72 hours. No lab exists for component: SGOT, GPT, DBIL   Thyroid Studies Lab Results   Component Value Date/Time    TSH 0.21 (L) 03/19/2021 04:00 AM          All Micro Results     Procedure Component Value Units Date/Time    COVID-19 WITH INFLUENZA A/B [[id number]] Collected: 07/08/22 1055    Order Status: Completed Specimen: Nasopharyngeal Updated: 07/08/22 1142     SARS-CoV-2 by PCR Not detected        Comment: Not Detected results do not preclude SARS-CoV-2 infection and should not be used as the sole basis for patient management decisions. Results must be combined with clinical observations, patient history, and epidemiological information. Influenza A by PCR Not detected        Influenza B by PCR Not detected        Comment: Testing was performed using mathew Tara SARS-CoV-2 and Influenza A/B nucleic acid assay. This test is a multiplex Real-Time Reverse Transcriptase Polymerase Chain Reaction (RT-PCR) based in [employer] diagnostic test intended for the qualitative detection of nucleic acids from SARS-CoV-2, Influenza A, and Influenza B in nasopharyngeal for use under the FDA's Emergency Use Authorization (EAU) only.        Fact sheet for Patients: FindDrives.pl  Fact sheet for Healthcare Providers: Robert.pl               Imaging Reviewed:  CT Results (most recent):  Results from Hospital Encounter encounter on 07/08/22    CTA CHEST W OR W WO CONT    Narrative  CT chest with contrast for PE    HISTORY: elevated ddimer, sob    COMPARISON: CT 3/18/21. TECHNIQUE: Dynamic spiral scan through the chest is obtained from the thoracic  inlet to the diaphragm after dynamic nonionic IV contrast administration  per PE  protocol. Coronal and sagittal MIP computer reconstructions are also obtained  for better visualization of the integrity of pulmonary arteries in 3D dimension,  particularly for lobar/interlobar arterial branches and to minimize radiation  dose. All CT scans at this facility performed using dose optimization techniques as  appreciated to a performed exam, to include automated exposure control,  adjustment of the mA and or KU according to patient size (including appropriate  matching for site specific examination), or use of iterative reconstruction  technique. FINDINGS:    PULMONARY ARTERIES: The contrast bolus is adequate. The right and left mainstem  pulmonary arteries and their branches appear patent without convincing evidence  of intraluminal filling defect identified to suggest pulmonary embolism. Mild  pulmonary arterial dilatation appears stable. AORTA AND VASCULAR STRUCTURES: The thoracic aorta is normal in caliber but not  well opacified by contrast for further evaluation. Unremarkable great vessels. CARDIOVASCULAR ASSESSMENT:  - HEART: The heart is normal in size. No pericardial effusion.  - RV/LV (normal < 0.9) : Normal.  - VENTRICULAR SEPTAL BOWING: Not seen. - There is no contrast reflux visualized in IVC and hepatic veins. LUNG PARENCHYMA: The extensive interlobular septal thickening appear much  progressed since the prior CT . Extensive reticular nodular interstitial  opacities appears progressed as well.  There are also several ill-defined  airspace nodules developed in the bilateral lungs including 8 x 9 mm nodule at  medial right upper lobe on #16/3, 4 x 5 mm nodule posterior right upper lobe on  #18/3, posteriorly adjacent 6 x 8 mm nodule at anterior right lower lobe near  the fissure on #21/3, 4 mm nodule anterior right upper lobe #25/3, 8 x 11 mm  subpleural nodule anterior right middle lobe #33/3, 4 by 8mm nodule at right  lung base on #34/3, 4 mm nodule posterior left upper lobe abutting the left  major fissure on #24/3, 5 mm pleural-based nodule at medial posterior left lower  lobe #37/3. Composed linear atelectasis in bilateral lower lungs. IMAGED THYROID: Partially seen. MEDIASTINUM: Mild mediastinal and bilateral hilar adenopathy appears interval  progressed. PLEURAL SPACES AND CHEST WALL: Small bilateral pleural effusions are new. VISUALIZED UPPER ABDOMEN: Unremarkable. OSSEOUS STRUCTURES: Moderate spondylosis. Impression  1. No convincing CT evidence of pulmonary embolism. 2.  Mild pulmonary arterial dilatation is suggestive pulmonary arterial  hypertension. 3. Moderate progression of intralobular septal thickening and reticulonodular  opacities since the prior CT which could represent interstitial pneumonitis  and/or edema. 4. Several new ill-defined airspace nodules also developed in bilateral lungs,  likely portion of the inflammatory/infectious process. Short-term follow-up CT  after treatment advised for interval resolution. 5. Mild progression of mediastinal and hilar adenopathy. 6. New small bilateral pleural effusions. Thank you for your referral.    XR Results (most recent):  Results from Hospital Encounter encounter on 07/08/22    XR CHEST PORT    Narrative  EXAM: Chest Radiograph    INDICATION:  chest pain,    TECHNIQUE: AP view of the chest    COMPARISON: 6/25/2022, 3/7/2022, CT dated 3/18/2021    FINDINGS: No pneumothorax identified.   Mild groundglass changes are noted  diffusely through the lungs which appears more conspicuous than prior imaging  concerning for interstitial pneumonitis. No effusions identified. The cardiac  silhouette is mildly prominent. Consultations in the thoracic aorta. The  pulmonary vasculature is unremarkable. Scoliosis is noted in the thoracic spine  with degenerative changes of the shoulders. Impression  1. Findings concerning for interstitial pneumonitis. Continued follow-up is  recommended. Assessment:   Active Problems:    Hyperkalemia (3/7/2022)      Pulmonary hypertension ([address] (7/8/2022)      Acute exacerbation of CHF (congestive heart failure) ([address] (7/8/2022)    CARA on CKD IV, suspect cardiorenal         Plan:   1. Nephrology recommendations strict I/O's, hold further diuretics at this time. Patient received IV contrast today. One time dose IV bumex given. Monitor potassium and renal function. Further recommendations pending clinical course. 2. Cardiology consult. Follow up tomorrow. Pulmonary HTN and HF symptoms. Further recommendations pending evaluation and clinical course. Restart home BB, ASA, and statin. Home hydralazine on hold for now. 3. Monitor oxygen demand and wean. duoneb treatments scheduled and as needed. Home respiratory medications re ordered. Bronchial hygiene   4. POC glucose and SSI. Home long acting insulin on hold for now  5. Full code   6. Family at bedside and updated on plan of care. Patient is alert and oriented x4 and of sound mind to update family.            Tatiana Juarez NP  7/8/2022, 3:29 PM

## 2022-07-08 NOTE — ED TRIAGE NOTES
Pt c/o of SOB and chest pain that started on Wednesday. Pt states chest pain is on her right side and radiates to her back.

## 2022-07-08 NOTE — PROGRESS NOTES
Ipratropium scheduled q8h +Brovana (arformoterol) 15 mcg/2 mL1 neb BID was therapeutically interchanged for Tiotropium/olodaterol (Stiolto) per the P&T Committee approved Therapeutic Interchanges Policy.

## 2022-07-08 NOTE — Clinical Note
Status[de-identified] INPATIENT [101]   Type of Bed: Telemetry [19]   Cardiac Monitoring Required?: Yes   Inpatient Hospitalization Certified Necessary for the Following Reasons: 3.  Patient receiving treatment that can only be provided in an inpatient setting (further clarification in H&P documentation)   Admitting Diagnosis: Pulmonary hypertension ([facility] [address] [[id number]]   Admitting Diagnosis: Hyperkalemia [[id number]]   Admitting Diagnosis: Acute exacerbation of CHF (congestive heart failure) [facility]) [[id number]]   Admitting Physician: Madie Habermann [[id number]]   Attending Physician: Madie Habermann [[id number]]   Estimated Length of Stay: 3-4 Midnights   Discharge Plan[de-identified] Home with Office Follow-up

## 2022-07-08 NOTE — ED PROVIDER NOTES
EMERGENCY DEPARTMENT HISTORY AND PHYSICAL EXAM      Date: 7/8/2022  Patient Name: July He    History of Presenting Illness     Chief Complaint   Patient presents with    Shortness of Breath    Chest Pain       History Provided By: Patient    HPI: July He, 81 y.o. female PMHx significant for hypertension, DM, dyslipidemia, CHF, vitamin D deficiency, COPD, gout, anemia presents ambulatory to the ED. Patient reports worsening shortness of breath over the past 3 days. Sx worse with ambulation. Pt recently discharged from [address] [place] [address] three days ago and multiple fluid pills were discontinued at discharge due to hyperkalemia and CARA. Patient reports she has been using her inhalers at home as prescribed. Patient is not on home oxygen. Patient also reports intermittent lower leg swelling. Denies lower leg pain. Denies chest pain. Denies cough, congestion, fever/chills. Denies known exposure to COVID. Denies previous hx blood clot. Denies taking blood thinners. There are no other complaints, changes, or physical findings at this time. PCP: Alfonso lAeman MD    No current facility-administered medications on file prior to encounter. Current Outpatient Medications on File Prior to Encounter   Medication Sig Dispense Refill    hydrALAZINE (APRESOLINE) 100 mg tablet TAKE 1 TABLET BY MOUTH 2 TIMES A DAY WITH FOOD      gabapentin (NEURONTIN) 400 mg capsule Take 400 mg by mouth daily.  tiZANidine (ZANAFLEX) 4 mg tablet Take 4 mg by mouth two (2) times daily as needed for Muscle Spasm(s).  ezetimibe (ZETIA) 10 mg tablet Take 10 mg by mouth daily.  calcitRIOL (ROCALTROL) 0.25 mcg capsule Take 1 Cap by mouth daily. 30 Cap 0    albuterol-ipratropium (DUO-NEB) 2.5 mg-0.5 mg/3 ml nebu 3 mL by Nebulization route every six (6) hours as needed for Wheezing. 30 Nebule 0    atorvastatin (LIPITOR) 80 mg tablet Take 1 Tab by mouth daily.  90 Tab 3    carvediloL (COREG) 25 mg tablet Take 1 Tab by mouth two (2) times daily (with meals). 180 Tab 3    albuterol (PROVENTIL HFA, VENTOLIN HFA, PROAIR HFA) 90 mcg/actuation inhaler Take 2 Puffs by inhalation every four (4) hours as needed for Wheezing. 1 Inhaler 0    aspirin 81 mg chewable tablet Take 2 Tabs by mouth daily. 60 Tab 0    insulin detemir (LEVEMIR) 100 unit/mL injection 15 units daily for diabetes 2 Vial 0    desloratadine (CLARINEX) 5 mg tablet Take 1 Tab by mouth daily as needed for Allergies. 30 Tab 0    mometasone (NASONEX) 50 mcg/actuation nasal spray 2 Sprays by Both Nostrils route daily as needed. For allergies 3 Container 3    umeclidinium-vilanterol (ANORO ELLIPTA) 62.5-25 mcg/actuation inhaler Take 1 Puff by inhalation daily. For COPD 3 Inhaler 3       Past History     Past Medical History:  Past Medical History:   Diagnosis Date    Abnormal WBC count 5/17/2016    Anemia     Bilateral shoulder pain 9/27/2016    Chondromalacia of both patellae     COPD (chronic obstructive pulmonary disease) ([facility] [address] 9/2015    mild-mod dz; Dr Dionne Sumner    Diabetes [facility]) 2013    Diabetic eye exam ([facility] [address] 2016    Dilated cardiomyopathy ([facility] [address]     Dyslipidemia     Gout     Heart attack ([facility] [address] 10/18/2019    History of echocardiogram 11/14/2014    Mild LVE. EF 40%. Mild, diffuse hypk. Mild LAE.   Mild MR.      Hypercholesteremia 1/08/14    Hypertension 2000    Noncompliance with medications 2/16/2016    Obesity     Orthostatic hypotension 5/17/2016    Osteoarthritis of both knees     Pain management 04/01/2016    Dr. Donavan Long Popliteal cyst, bilateral      Vitamin D deficiency 10/16/14       Past Surgical History:  Past Surgical History:   Procedure Laterality Date    HX HEART CATHETERIZATION      HX TUBAL LIGATION         Family History:  Family History   Problem Relation Age of Onset    Diabetes Mother     Hypertension Mother     Hypertension Father     Kidney Disease Maternal Aunt 48        Dialysis       Social History:  Social History     Tobacco Use    Smoking status: Former Smoker     Packs/day: 0.25     Years: 48.00     Pack years: 12.00     Types: Cigarettes     Quit date: 2018     Years since quittin.1    Smokeless tobacco: Never Used   Substance Use Topics    Alcohol use: No     Alcohol/week: 0.0 standard drinks    Drug use: No       Allergies:  No Known Allergies      Review of Systems   Review of Systems   Constitutional: Negative for chills and fever. Respiratory: Positive for shortness of breath. Cardiovascular: Positive for chest pain. Gastrointestinal: Negative for abdominal pain, nausea and vomiting. Genitourinary: Negative for flank pain. Musculoskeletal: Negative for back pain and myalgias. Skin: Negative for color change, pallor, rash and wound. Neurological: Negative for dizziness, weakness and light-headedness. All other systems reviewed and are negative. Physical Exam   Physical Exam  Vitals and nursing note reviewed. Constitutional:       General: She is not in acute distress. Appearance: She is well-developed. Comments: Pt in NAD   HENT:      Head: Normocephalic and atraumatic. Eyes:      Conjunctiva/sclera: Conjunctivae normal.   Cardiovascular:      Rate and Rhythm: Normal rate and regular rhythm. Heart sounds: Normal heart sounds. Comments: Nonpitting edema to lower legs b/l  Pulmonary:      Effort: Pulmonary effort is normal. No respiratory distress. Breath sounds: Normal breath sounds. Comments: Expiratory wheezing in all lung fields  Working to breathe  Abdominal:      General: Bowel sounds are normal. There is no distension. Palpations: Abdomen is soft. Musculoskeletal:         General: Normal range of motion. Skin:     General: Skin is warm. Findings: No rash. Neurological:      Mental Status: She is alert and oriented to person, place, and time.    Psychiatric:         Behavior: Behavior normal. Diagnostic Study Results     Labs -     Recent Results (from the past 12 hour(s))   EKG, 12 LEAD, INITIAL    Collection Time: 07/08/22  9:40 AM   Result Value Ref Range    Ventricular Rate 94 BPM    Atrial Rate 94 BPM    P-R Interval 166 ms    QRS Duration 76 ms    Q-T Interval 346 ms    QTC Calculation (Bezet) 432 ms    Calculated P Axis 78 degrees    Calculated R Axis 30 degrees    Calculated T Axis 101 degrees    Diagnosis       Normal sinus rhythm  Abnormal QRS-T angle, consider primary T wave abnormality  Abnormal ECG  When compared with ECG of 25-JUN-2022 23:26,  Vent. rate has increased BY  35 BPM     CBC WITH AUTOMATED DIFF    Collection Time: 07/08/22  9:53 AM   Result Value Ref Range    WBC 8.0 4.6 - 13.2 K/uL    RBC 3.66 (L) 4.20 - 5.30 M/uL    HGB 9.9 (L) 12.0 - 16.0 g/dL    HCT 33.2 (L) 35.0 - 45.0 %    MCV 90.7 78.0 - 100.0 FL    MCH 27.0 24.0 - 34.0 PG    MCHC 29.8 (L) 31.0 - 37.0 g/dL    RDW 16.1 (H) 11.6 - 14.5 %    PLATELET 216 768 - 719 K/uL    MPV 9.5 9.2 - 11.8 FL    NRBC 1.0 (H) 0  WBC    ABSOLUTE NRBC 0.08 (H) 0.00 - 0.01 K/uL    NEUTROPHILS 73 40 - 73 %    LYMPHOCYTES 18 (L) 21 - 52 %    MONOCYTES 8 3 - 10 %    EOSINOPHILS 1 0 - 5 %    BASOPHILS 0 0 - 2 %    IMMATURE GRANULOCYTES 0 0.0 - 0.5 %    ABS. NEUTROPHILS 5.8 1.8 - 8.0 K/UL    ABS. LYMPHOCYTES 1.4 0.9 - 3.6 K/UL    ABS. MONOCYTES 0.6 0.05 - 1.2 K/UL    ABS. EOSINOPHILS 0.1 0.0 - 0.4 K/UL    ABS. BASOPHILS 0.0 0.0 - 0.1 K/UL    ABS. IMM.  GRANS. 0.0 0.00 - 0.04 K/UL    DF AUTOMATED     METABOLIC PANEL, BASIC    Collection Time: 07/08/22  9:53 AM   Result Value Ref Range    Sodium 139 136 - 145 mmol/L    Potassium 5.8 (H) 3.5 - 5.5 mmol/L    Chloride 109 100 - 111 mmol/L    CO2 25 21 - 32 mmol/L    Anion gap 5 3.0 - 18 mmol/L    Glucose 141 (H) 74 - 99 mg/dL    BUN 23 (H) 7.0 - 18 MG/DL    Creatinine 1.81 (H) 0.6 - 1.3 MG/DL    BUN/Creatinine ratio 13 12 - 20      GFR est AA 34 (L) >60 ml/min/1.73m2    GFR est non-AA 28 (L) >60 ml/min/1.73m2    Calcium 9.3 8.5 - 10.1 MG/DL   TROPONIN-HIGH SENSITIVITY    Collection Time: 07/08/22  9:53 AM   Result Value Ref Range    Troponin-High Sensitivity 24 0 - 54 ng/L   NT-PRO BNP    Collection Time: 07/08/22  9:53 AM   Result Value Ref Range    NT pro-BNP 3,952 (H) 0 - 900 PG/ML   MAGNESIUM    Collection Time: 07/08/22  9:53 AM   Result Value Ref Range    Magnesium 2.0 1.6 - 2.6 mg/dL   D DIMER    Collection Time: 07/08/22  9:53 AM   Result Value Ref Range    D DIMER 0.81 (H) <0.46 ug/ml(FEU)   COVID-19 WITH INFLUENZA A/B    Collection Time: 07/08/22 10:55 AM   Result Value Ref Range    SARS-CoV-2 by PCR Not detected NOTD      Influenza A by PCR Not detected NOTD      Influenza B by PCR Not detected NOTD         Radiologic Studies -   CTA CHEST W OR W WO CONT   Final Result   1. No convincing CT evidence of pulmonary embolism. 2.  Mild pulmonary arterial dilatation is suggestive pulmonary arterial   hypertension. 3. Moderate progression of intralobular septal thickening and reticulonodular   opacities since the prior CT which could represent interstitial pneumonitis   and/or edema. 4. Several new ill-defined airspace nodules also developed in bilateral lungs,   likely portion of the inflammatory/infectious process. Short-term follow-up CT   after treatment advised for interval resolution. 5. Mild progression of mediastinal and hilar adenopathy. 6. New small bilateral pleural effusions. Thank you for your referral.         XR CHEST PORT   Final Result   1. Findings concerning for interstitial pneumonitis. Continued follow-up is   recommended. CT Results  (Last 48 hours)               07/08/22 1353  CTA CHEST W OR W WO CONT Final result    Impression:  1. No convincing CT evidence of pulmonary embolism. 2.  Mild pulmonary arterial dilatation is suggestive pulmonary arterial   hypertension.    3. Moderate progression of intralobular septal thickening and reticulonodular   opacities since the prior CT which could represent interstitial pneumonitis   and/or edema. 4. Several new ill-defined airspace nodules also developed in bilateral lungs,   likely portion of the inflammatory/infectious process. Short-term follow-up CT   after treatment advised for interval resolution. 5. Mild progression of mediastinal and hilar adenopathy. 6. New small bilateral pleural effusions. Thank you for your referral.           Narrative:  CT chest with contrast for PE       HISTORY: elevated ddimer, sob       COMPARISON: CT 3/18/21. TECHNIQUE: Dynamic spiral scan through the chest is obtained from the thoracic   inlet to the diaphragm after dynamic nonionic IV contrast administration  per PE   protocol. Coronal and sagittal MIP computer reconstructions are also obtained   for better visualization of the integrity of pulmonary arteries in 3D dimension,   particularly for lobar/interlobar arterial branches and to minimize radiation   dose. All CT scans at this facility performed using dose optimization techniques as   appreciated to a performed exam, to include automated exposure control,   adjustment of the mA and or KU according to patient size (including appropriate   matching for site specific examination), or use of iterative reconstruction   technique. FINDINGS:       PULMONARY ARTERIES: The contrast bolus is adequate. The right and left mainstem   pulmonary arteries and their branches appear patent without convincing evidence   of intraluminal filling defect identified to suggest pulmonary embolism. Mild   pulmonary arterial dilatation appears stable. AORTA AND VASCULAR STRUCTURES: The thoracic aorta is normal in caliber but not   well opacified by contrast for further evaluation. Unremarkable great vessels. CARDIOVASCULAR ASSESSMENT:   - HEART: The heart is normal in size.  No pericardial effusion.    - RV/LV (normal < 0.9) : Normal.   - VENTRICULAR SEPTAL BOWING: Not seen. - There is no contrast reflux visualized in IVC and hepatic veins. LUNG PARENCHYMA: The extensive interlobular septal thickening appear much   progressed since the prior CT . Extensive reticular nodular interstitial   opacities appears progressed as well. There are also several ill-defined   airspace nodules developed in the bilateral lungs including 8 x 9 mm nodule at   medial right upper lobe on #16/3, 4 x 5 mm nodule posterior right upper lobe on   #18/3, posteriorly adjacent 6 x 8 mm nodule at anterior right lower lobe near   the fissure on #21/3, 4 mm nodule anterior right upper lobe #25/3, 8 x 11 mm   subpleural nodule anterior right middle lobe #33/3, 4 by 8mm nodule at right   lung base on #34/3, 4 mm nodule posterior left upper lobe abutting the left   major fissure on #24/3, 5 mm pleural-based nodule at medial posterior left lower   lobe #37/3. Composed linear atelectasis in bilateral lower lungs. IMAGED THYROID: Partially seen. MEDIASTINUM: Mild mediastinal and bilateral hilar adenopathy appears interval   progressed. PLEURAL SPACES AND CHEST WALL: Small bilateral pleural effusions are new. VISUALIZED UPPER ABDOMEN: Unremarkable. OSSEOUS STRUCTURES: Moderate spondylosis. CXR Results  (Last 48 hours)               07/08/22 1008  XR CHEST PORT Final result    Impression:  1. Findings concerning for interstitial pneumonitis. Continued follow-up is   recommended. Narrative:  EXAM: Chest Radiograph       INDICATION:  chest pain,       TECHNIQUE: AP view of the chest       COMPARISON: 6/25/2022, 3/7/2022, CT dated 3/18/2021       FINDINGS: No pneumothorax identified. Mild groundglass changes are noted   diffusely through the lungs which appears more conspicuous than prior imaging   concerning for interstitial pneumonitis. No effusions identified. The cardiac   silhouette is mildly prominent.  Consultations in the thoracic aorta. The   pulmonary vasculature is unremarkable. Scoliosis is noted in the thoracic spine   with degenerative changes of the shoulders. Medical Decision Making   I am the first provider for this patient. I reviewed the vital signs, available nursing notes, past medical history, past surgical history, family history and social history. Vital Signs-Reviewed the patient's vital signs. Patient Vitals for the past 12 hrs:   Temp Pulse Resp BP SpO2   07/08/22 0956 99.3 °F (37.4 °C) 95 20 (!) 166/112 92 %         EKG interpretation: (Preliminary)  Rhythm: normal sinus rhythm; and regular . Rate (approx.): 94; Axis: normal; ID interval: normal; QRS interval: normal ; ST/T wave: normal; Other findings: normal.    No acute ischemic changes. Similar to previous EKG form     Records Reviewed: Nursing Notes, Old Medical Records, Previous electrocardiograms, Previous Radiology Studies and Previous Laboratory Studies    Provider Notes (Medical Decision Making):   DDx: COPD Exacerbation, CHF, ACS, PE, PNA    82 yo F who presents with worsening shortness of breath x 3 days. On exam expiratory wheezing. Nonpitting lower leg edema. Cr elevated from baseline with elevated K. Given lokalma and breathing treatments. Negative COVID. CTA shows no PE with likely pulmonary htn. Elevated BNP with small pleural effusions on CTA. Pt admitted for CHF exacerbation with intermittent hypoxia and elevated K.     ED Course:   Initial assessment performed. The patients presenting problems have been discussed, and they are in agreement with the care plan formulated and outlined with them. I have encouraged them to ask questions as they arise throughout their visit. Chart review:   Hospital Course:   Presented to [facility] ED on 6/27/22 after experiencing faintness and dyspnea on exertion at work. Patient initially with hyperkalemia up to 7.1. Initial creatinine of 2.25, improved to 1.55.   Bicarb drip started. CT abdomen pelvis without contrast was ordered to rule out obstruction. Results posted below. Given CARA and hyperkalemia Bumex 1 mg was held. She had also been taking Aldactone, Entresto, Clinoril which were discontinued advisement of nephrology. Patient will follow-up with nephrology in outpatient office. 1300: Dr Sofia Scales, nephrology evaluated pt at bedside. He recommends ordering 1.5 mg of IV bumex and he suspects likely CHF exacerbation. 1449: Spoke with Dr King Matta, consult hospitalist. Discussed pt's recent admission with new onset shortness of breath, fluid overload and hyperkalemia. Discussed medications given and he agrees to admit patient. Disposition:  Admitted    PLAN:  1. Current Discharge Medication List        2. Follow-up Information    None       Return to ED if worse     Diagnosis     Clinical Impression:   1. Acute congestive heart failure, unspecified heart failure type ([address]    2. Acute hyperkalemia    3. Elevated serum creatinine        Attestations:    OLRE Patel    Please note that this dictation was completed with [employer], the [employer] voice recognition software. Quite often unanticipated grammatical, syntax, homophones, and other interpretive errors are inadvertently transcribed by the computer software. Please disregard these errors. Please excuse any errors that have escaped final proofreading. Thank you.

## 2022-07-09 LAB
ANION GAP SERPL CALC-SCNC: 7 MMOL/L (ref 3–18)
BUN SERPL-MCNC: 33 MG/DL (ref 7–18)
BUN/CREAT SERPL: 16 (ref 12–20)
CALCIUM SERPL-MCNC: 9.1 MG/DL (ref 8.5–10.1)
CHLORIDE SERPL-SCNC: 110 MMOL/L (ref 100–111)
CO2 SERPL-SCNC: 22 MMOL/L (ref 21–32)
CREAT SERPL-MCNC: 2.12 MG/DL (ref 0.6–1.3)
GLUCOSE BLD STRIP.AUTO-MCNC: 116 MG/DL (ref 70–110)
GLUCOSE BLD STRIP.AUTO-MCNC: 137 MG/DL (ref 70–110)
GLUCOSE BLD STRIP.AUTO-MCNC: 137 MG/DL (ref 70–110)
GLUCOSE BLD STRIP.AUTO-MCNC: 166 MG/DL (ref 70–110)
GLUCOSE BLD STRIP.AUTO-MCNC: 228 MG/DL (ref 70–110)
GLUCOSE BLD STRIP.AUTO-MCNC: 252 MG/DL (ref 70–110)
GLUCOSE SERPL-MCNC: 196 MG/DL (ref 74–99)
POTASSIUM SERPL-SCNC: 5.5 MMOL/L (ref 3.5–5.5)
SODIUM SERPL-SCNC: 139 MMOL/L (ref 136–145)

## 2022-07-09 PROCEDURE — 94640 AIRWAY INHALATION TREATMENT: CPT

## 2022-07-09 PROCEDURE — 94761 N-INVAS EAR/PLS OXIMETRY MLT: CPT

## 2022-07-09 PROCEDURE — 77010033678 HC OXYGEN DAILY

## 2022-07-09 PROCEDURE — 74011000250 HC RX REV CODE- 250: Performed by: FAMILY MEDICINE

## 2022-07-09 PROCEDURE — 74011250636 HC RX REV CODE- 250/636: Performed by: NURSE PRACTITIONER

## 2022-07-09 PROCEDURE — 99232 SBSQ HOSP IP/OBS MODERATE 35: CPT | Performed by: INTERNAL MEDICINE

## 2022-07-09 PROCEDURE — 80048 BASIC METABOLIC PNL TOTAL CA: CPT

## 2022-07-09 PROCEDURE — 74011250637 HC RX REV CODE- 250/637: Performed by: NURSE PRACTITIONER

## 2022-07-09 PROCEDURE — 74011636637 HC RX REV CODE- 636/637: Performed by: NURSE PRACTITIONER

## 2022-07-09 PROCEDURE — 82962 GLUCOSE BLOOD TEST: CPT

## 2022-07-09 PROCEDURE — 65270000046 HC RM TELEMETRY

## 2022-07-09 PROCEDURE — 99223 1ST HOSP IP/OBS HIGH 75: CPT | Performed by: INTERNAL MEDICINE

## 2022-07-09 PROCEDURE — 36415 COLL VENOUS BLD VENIPUNCTURE: CPT

## 2022-07-09 PROCEDURE — 74011000250 HC RX REV CODE- 250: Performed by: NURSE PRACTITIONER

## 2022-07-09 RX ADMIN — SODIUM CHLORIDE, PRESERVATIVE FREE 10 ML: 5 INJECTION INTRAVENOUS at 16:04

## 2022-07-09 RX ADMIN — ARFORMOTEROL TARTRATE 15 MCG: 15 SOLUTION RESPIRATORY (INHALATION) at 07:24

## 2022-07-09 RX ADMIN — IPRATROPIUM BROMIDE AND ALBUTEROL SULFATE 3 ML: .5; 2.5 SOLUTION RESPIRATORY (INHALATION) at 00:33

## 2022-07-09 RX ADMIN — Medication 6 UNITS: at 10:24

## 2022-07-09 RX ADMIN — IPRATROPIUM BROMIDE AND ALBUTEROL SULFATE 3 ML: .5; 2.5 SOLUTION RESPIRATORY (INHALATION) at 11:25

## 2022-07-09 RX ADMIN — CARVEDILOL 25 MG: 25 TABLET, FILM COATED ORAL at 16:08

## 2022-07-09 RX ADMIN — SODIUM CHLORIDE, PRESERVATIVE FREE 10 ML: 5 INJECTION INTRAVENOUS at 22:05

## 2022-07-09 RX ADMIN — ACETAMINOPHEN 650 MG: 325 TABLET ORAL at 10:25

## 2022-07-09 RX ADMIN — ATORVASTATIN CALCIUM 80 MG: 40 TABLET, FILM COATED ORAL at 10:14

## 2022-07-09 RX ADMIN — IPRATROPIUM BROMIDE AND ALBUTEROL SULFATE 3 ML: .5; 2.5 SOLUTION RESPIRATORY (INHALATION) at 03:11

## 2022-07-09 RX ADMIN — ASPIRIN 81 MG CHEWABLE TABLET 81 MG: 81 TABLET CHEWABLE at 10:14

## 2022-07-09 RX ADMIN — IPRATROPIUM BROMIDE AND ALBUTEROL SULFATE 3 ML: .5; 2.5 SOLUTION RESPIRATORY (INHALATION) at 19:08

## 2022-07-09 RX ADMIN — ARFORMOTEROL TARTRATE 15 MCG: 15 SOLUTION RESPIRATORY (INHALATION) at 19:08

## 2022-07-09 RX ADMIN — CARVEDILOL 25 MG: 25 TABLET, FILM COATED ORAL at 10:14

## 2022-07-09 RX ADMIN — IPRATROPIUM BROMIDE AND ALBUTEROL SULFATE 3 ML: .5; 2.5 SOLUTION RESPIRATORY (INHALATION) at 15:12

## 2022-07-09 RX ADMIN — IPRATROPIUM BROMIDE AND ALBUTEROL SULFATE 3 ML: .5; 2.5 SOLUTION RESPIRATORY (INHALATION) at 07:24

## 2022-07-09 RX ADMIN — EZETIMIBE 10 MG: 10 TABLET ORAL at 10:14

## 2022-07-09 NOTE — PROGRESS NOTES
Pt continues to c/o pain, states she has expressed pain to, \"two doctors\". No new orders for pain medication. Pt may have prn Tylenol.

## 2022-07-09 NOTE — PROGRESS NOTES
Pt refused scheduled heparin, states she has taken it before and she began to bleed from her vagina. Pt stated that she only takes, \"2 baby aspirin a day\" and does not want any prophylactic blood thinners even after being educated on the risk and benefits of anticoagulation therapy while in patient. Will continue to monitor.

## 2022-07-09 NOTE — CONSULTS
Cardiology Consult Note    Consultation request by Algis Bamberger, MD for advice/opinion related to evaluating for shortness of breath    Date of  Admission: 7/8/2022  9:57 AM   Primary Care Physician:  Tamiko Gallegos MD       Assessment:     Hospital Problems  Date Reviewed: 4/26/2021          Codes Class Noted POA    Pulmonary hypertension ([facility] ICD-10-CM: I27.20  ICD-9-CM: 416.8  7/8/2022 Unknown        Acute exacerbation of CHF (congestive heart failure) ([facility] ICD-10-CM: I50.9  ICD-9-CM: 428.0  7/8/2022 Unknown        Hyperkalemia ICD-10-CM: E87.5  ICD-9-CM: 276.7  3/7/2022 Unknown            1.  Shortness of breath multifactorial possible CHF but clinically appears more to be pneumonitis her CTA and chest x-ray are more suggestive of that. And concern about viral pneumonitis in this patient. 2.  Cardiomyopathy with mildly reduced systolic function  3. Acute on chronic chronic systolic CHF with mildly reduced systolic function. Patient had edema on admission which has improved. I do not see any clinical sign of fluid overload at present. Agree with holding diuresis particularly she is getting contrast  4. Recent admission with hyperkalemia. Currently renal on board  5. CAD with prior coronary stent and clinically appears stable. No clinical sign of ACS. Troponin is negative  6. Elevated NT BNP pro likely combination of renal insufficiency and CHF  7. Acute on chronic renal insufficiency admission creatinine and GFR are worse compared to discharge. Continue monitoring and follow-up with renal.   8. no evidence of pulmonary hypertension reported on recent echo. Primary cardiologist Dr. Tate Henderson:   1. Continue supportive care for shortness of breath with oxygen  2. Consider pulmonary/ID evaluation for viral pneumonitis  3. IV diuretic as tolerated and as needed. 4.  Monitor renal function and electrolyte  5.   Continue supportive care       History of Present Illness: This is a 81 y.o. female admitted for Pulmonary hypertension ([facility].) [I27.20]; Hyperkalemia [E87.5]; Acute exacerbation of CHF (congestive heart failure) ([facility].) [I50.9]. Patient complains of: Shortness of breath  Patient presented to emergency room with complaint of shortness of breath edema. She was recently here with dizziness and hyperkalemia on a diuretic were held. Day before admission she noted significant swelling. She has noted significant throat soreness sinus congestion drainage into the throat and coughing for past few days. She has chest tightness when this happens. She has orthopnea and peripheral edema on admission. Since then her edema is improved but she remains persistently short of breath,          Review of Symptoms:  Except as stated above include:  Constitutional: Congestion, sore throat, drainage  Respiratory: Shortness of breath  Cardiovascular: Chest tightness  Gastrointestinal: negative  Genitourinary:  negative  Musculoskeletal: Edema  Neurological:  Negative  Dermatological:  Negative  Endocrinological: Negative  Psychological:  Negative    A comprehensive review of systems was negative except for that written in the HPI. Past Medical History:     Past Medical History:   Diagnosis Date    Abnormal WBC count 5/17/2016    Anemia     Bilateral shoulder pain 9/27/2016    Chondromalacia of both patellae     COPD (chronic obstructive pulmonary disease) ([facility] 9/2015    mild-mod dz; Dr Bryson Truong    Diabetes [facility]) 2013    Diabetic eye exam ([facility] 2016    Dilated cardiomyopathy ([facility]     Dyslipidemia     Gout     Heart attack ([facility] 10/18/2019    History of echocardiogram 11/14/2014    Mild LVE. EF 40%. Mild, diffuse hypk. Mild LAE.   Mild MR.      Hypercholesteremia 1/08/14    Hypertension 2000    Noncompliance with medications 2/16/2016    Obesity     Orthostatic hypotension 5/17/2016    Osteoarthritis of both knees     Pain management 04/01/2016    Dr. Avi Castro  Popliteal cyst, bilateral      Vitamin D deficiency 10/16/14         Social History:     Social History     Socioeconomic History    Marital status:     Number of children: 3   Occupational History    Occupation: retired   Tobacco Use    Smoking status: Former Smoker     Packs/day: 0.25     Years: 48.00     Pack years: 12.00     Types: Cigarettes     Quit date: 2018     Years since quittin.1    Smokeless tobacco: Never Used   Substance and Sexual Activity    Alcohol use: No     Alcohol/week: 0.0 standard drinks    Drug use: No    Sexual activity: Yes     Partners: Male   Other Topics Concern     Service No    Blood Transfusions No    Caffeine Concern No    Occupational Exposure No    Hobby Hazards No    Sleep Concern No    Stress Concern No    Weight Concern No    Special Diet No    Back Care No    Exercise No    Bike Helmet No    Seat Belt Yes    Self-Exams Yes        Family History:     Family History   Problem Relation Age of Onset    Diabetes Mother     Hypertension Mother     Hypertension Father     Kidney Disease Maternal Aunt 50        Dialysis        Medications:   No Known Allergies     Current Facility-Administered Medications   Medication Dose Route Frequency    sodium chloride (NS) flush 5-40 mL  5-40 mL IntraVENous Q8H    sodium chloride (NS) flush 5-40 mL  5-40 mL IntraVENous PRN    acetaminophen (TYLENOL) tablet 650 mg  650 mg Oral Q6H PRN    Or    acetaminophen (TYLENOL) suppository 650 mg  650 mg Rectal Q6H PRN    polyethylene glycol (MIRALAX) packet 17 g  17 g Oral DAILY PRN    ondansetron (ZOFRAN ODT) tablet 4 mg  4 mg Oral Q8H PRN    Or    ondansetron (ZOFRAN) injection 4 mg  4 mg IntraVENous Q6H PRN    heparin (porcine) injection 5,000 Units  5,000 Units SubCUTAneous Q8H    insulin lispro (HUMALOG) injection   SubCUTAneous AC&HS    glucose chewable tablet 16 g  4 Tablet Oral PRN    glucagon (GLUCAGEN) injection 1 mg  1 mg IntraMUSCular PRN    dextrose 10% infusion 0-250 mL  0-250 mL IntraVENous PRN    atorvastatin (LIPITOR) tablet 80 mg  80 mg Oral DAILY    carvediloL (COREG) tablet 25 mg  25 mg Oral BID WITH MEALS    ezetimibe (ZETIA) tablet 10 mg  10 mg Oral DAILY    aspirin chewable tablet 81 mg  81 mg Oral DAILY    arformoteroL (BROVANA) neb solution 15 mcg  15 mcg Nebulization BID RT    albuterol-ipratropium (DUO-NEB) 2.5 MG-0.5 MG/3 ML  3 mL Nebulization Q4H RT         Physical Exam:     Visit Vitals  BP (!) 144/56   Pulse 99   Temp 99.3 °F (37.4 °C)   Resp 20   SpO2 98%           BP Readings from Last 3 Encounters:   07/09/22 (!) 144/56   06/29/22 128/67   03/11/22 (!) 156/80     Pulse Readings from Last 3 Encounters:   07/09/22 99   06/29/22 81   06/23/22 87     Wt Readings from Last 3 Encounters:   06/29/22 97.1 kg (214 lb)   06/23/22 97.2 kg (214 lb 3.2 oz)   06/08/22 100.1 kg (220 lb 9.6 oz)       General:  alert, cooperative, mild distress, appears stated age  Neck:  nontender, no carotid bruit, no JVD  Lungs:  clear to auscultation bilaterally  Heart:  S1, S2 normal, no S3 or S4  Abdomen:  abdomen is soft without significant tenderness, masses, organomegaly or guarding  Extremities:  extremities normal, atraumatic, no cyanosis or edema  Skin: Warm and dry.  no hyperpigmentation, vitiligo, or suspicious lesions  Neuro: alert, oriented x3, affect appropriate, no focal neurological deficits, moves all extremities well, no involuntary movements, reflexes at knee and ankle intact  Psych: non focal     Data Review:     Recent Labs     07/08/22  0953   WBC 8.0   HGB 9.9*   HCT 33.2*        Recent Labs     07/08/22  0953      K 5.8*      CO2 25   *   BUN 23*   CREA 1.81*   CA 9.3   MG 2.0       Results for orders placed or performed during the hospital encounter of 07/08/22   EKG, 12 LEAD, INITIAL   Result Value Ref Range    Ventricular Rate 94 BPM    Atrial Rate 94 BPM    P-R Interval 166 ms QRS Duration 76 ms    Q-T Interval 346 ms    QTC Calculation (Bezet) 432 ms    Calculated P Axis 78 degrees    Calculated R Axis 30 degrees    Calculated T Axis 101 degrees    Diagnosis       Normal sinus rhythm  Abnormal QRS-T angle, consider primary T wave abnormality  Abnormal ECG  When compared with ECG of 25-JUN-2022 23:26,  Vent. rate has increased BY  35 BPM  Confirmed by Loy Coleman ([id number]) on 7/8/2022 4:27:14 PM     Results for orders placed or performed in visit on 08/23/18   AMB POC EKG ROUTINE W/ 12 LEADS, INTER & REP    Impression    See progress note. All Cardiac Markers in the last 24 hours:  No results found for: CPK, CK, CKMMB, CKMB, RCK3, CKMBT, CKNDX, CKND1, GODFREY, TROPT, TROIQ, SANG, TROPT, TNIPOC, BNP, BNPP    Last Lipid:    Lab Results   Component Value Date/Time    Cholesterol, total 152 10/10/2019 12:00 AM    HDL Cholesterol 46 10/10/2019 12:00 AM    LDL, calculated 86 10/10/2019 12:00 AM    Triglyceride 101 10/10/2019 12:00 AM    CHOL/HDL Ratio 5.6 (H) 05/21/2018 06:23 AM       Cardiographics:     EKG Results     Procedure 720 Value Units Date/Time    EKG, 12 LEAD, INITIAL [[id number]] Collected: 07/08/22 0940    Order Status: Completed Updated: 07/08/22 1627     Ventricular Rate 94 BPM      Atrial Rate 94 BPM      P-R Interval 166 ms      QRS Duration 76 ms      Q-T Interval 346 ms      QTC Calculation (Bezet) 432 ms      Calculated P Axis 78 degrees      Calculated R Axis 30 degrees      Calculated T Axis 101 degrees      Diagnosis --     Normal sinus rhythm  Abnormal QRS-T angle, consider primary T wave abnormality  Abnormal ECG  When compared with ECG of 25-JUN-2022 23:26,  Vent. rate has increased BY  35 BPM  Confirmed by Loy Coleman ([id number]) on 7/8/2022 4:27:14 PM          06/25/22    ECHO ADULT COMPLETE 06/29/2022 6/29/2022    Interpretation Summary    Left Ventricle: Mildly reduced left ventricular systolic function with a visually estimated EF of 50 - 55%.  Left ventricle is mildly dilated. Increased wall thickness. Findings consistent with mild concentric hypertrophy. See diagram for wall motion findings. Diastolic dysfunction present with normal LV EF.   Mitral Valve: Mildly thickened leaflet. Mild regurgitation with a centrally directed jet. Signed by: Merari Bradley MD on 6/29/2022 12:12 PM      08/25/19    NUCLEAR CARDIAC STRESS TEST 08/28/2019 8/28/2019    Interpretation Summary  · Baseline ECG: Normal sinus rhythm, occasional PACs. · Gated SPECT: Left ventricular function post-stress was abnormal. Calculated ejection fraction is 44%. The TID ratio is 0.94. · Negative stress test.  · Left ventricular perfusion is abnormal.  · Myocardial perfusion imaging defect 1: There is a defect that is moderate in size with a severe reduction in uptake present in the mid-apical anterior and apex location(s) that is non-reversible. There is abnormal wall motion in the defect area. Viability in the area is poor. The defect appears to be infarction. Perfusion defect was visually present without quantitative evidence. · Positive myocardial perfusion imaging. Myocardial perfusion imaging supports an intermediate risk stress test.    Signed by: Teagan Cummings MD on 8/28/2019  3:08 PM    10/18/19    CARDIAC PROCEDURE 10/25/2019 10/25/2019    Conclusion  Patent stent in proximal LAD. Mildly reduced LV function. Continue intense risk factor modification. Signed by: Patty Smart MD on 10/25/2019  1:50 PM      XR Results (most recent):  Results from [place] encounter on 07/08/22    XR CHEST PORT    Narrative  EXAM: Chest Radiograph    INDICATION:  chest pain,    TECHNIQUE: AP view of the chest    COMPARISON: 6/25/2022, 3/7/2022, CT dated 3/18/2021    FINDINGS: No pneumothorax identified. Mild groundglass changes are noted  diffusely through the lungs which appears more conspicuous than prior imaging  concerning for interstitial pneumonitis.  No effusions identified. The cardiac  silhouette is mildly prominent. Consultations in the thoracic aorta. The  pulmonary vasculature is unremarkable. Scoliosis is noted in the thoracic spine  with degenerative changes of the shoulders. Impression  1. Findings concerning for interstitial pneumonitis. Continued follow-up is  recommended. CT of chest    IMPRESSION 7/2022  1. No convincing CT evidence of pulmonary embolism. 2.  Mild pulmonary arterial dilatation is suggestive pulmonary arterial  hypertension. 3. Moderate progression of intralobular septal thickening and reticulonodular  opacities since the prior CT which could represent interstitial pneumonitis  and/or edema. 4. Several new ill-defined airspace nodules also developed in bilateral lungs,  likely portion of the inflammatory/infectious process. Short-term follow-up CT  after treatment advised for interval resolution. 5. Mild progression of mediastinal and hilar adenopathy. 6. New small bilateral pleural effusions.       Signed By: Bruno Short MD     July 9, 2022

## 2022-07-09 NOTE — PROGRESS NOTES
[facility] Hospitalist Group  Progress Note    Patient: Aubrey Eisenmenger Age: 81 y.o. : 1950 MR#: [id number] SSN: [id number]  Date/Time: 2022     C/C: Shortness of breath      Subjective:   HPI : Patient with past medical history of COPD, diabetes mellitus type 2, dilated cardiomyopathy last EF 40%, admitted with increased shortness of breath and leg edema. Review of Systems:  positive responses in bold type   Constitutional: Negative for fever, chills, diaphoresis and unexpected weight change. HENT: Negative for ear pain, congestion, sore throat, rhinorrhea, drooling, trouble swallowing, neck pain and tinnitus. Eyes: Negative for photophobia, pain, redness and visual disturbance. Respiratory: Positive for shortness of breath, cough, choking, chest tightness, wheezing or stridor. Cardiovascular: Negative for chest pain, palpitations and leg swelling. Gastrointestinal: Negative for nausea, vomiting, abdominal pain, diarrhea, constipation, blood in stool, abdominal distention and anal bleeding. Genitourinary: Negative for dysuria, urgency, frequency, hematuria, flank pain and difficulty urinating. Musculoskeletal: Negative for back pain and arthralgias. Skin: Negative for color change, rash and wound. Neurological: Negative for dizziness, seizures, syncope, speech difficulty, light-headedness or headaches. Hematological: Does not bruise/bleed easily. Psychiatric/Behavioral: Negative for suicidal ideas, hallucinations, behavioral problems, self-injury or agitation     Assessment/Plan:     1.   Acute dyspnea-CTA negative for PE  2 acute on chronic systolic heart failure  3 diabetes mellitus type 2  4 anemia of chronic illness  5 CARA on CKD 3  6 hyperkalemia-seen by nephrology    Plan  -Follow cardiology and nephrology recommendation  -Gentle diuresis, while on diuretic monitor renal functions-currently diuretics are on hold by renal as patient has received IV contrast for CTA. -Monitor electrolytes  Today's lab not available    Objective:       General:  Alert, cooperative, no acute distress   HEENT: No facial asymmetry, TANIA Wily, External ears - WNL    Cardiovascular: S1S2 - regular , No Murmur   Pulmonary: Equal expansion , No Use of accessory muscles , No Rales No Rhonchi    GI:  +BS in all four quadrants, soft, non-tender  Extremities:   2+ pedal edema bilateral  Neuro: Alert and oriented X 2.        DVT Prophylaxis:  []Lovenox  [x]Hep SQ  []SCDs  []Coumadin   []On Heparin gtt    [] Eliquis [] Xarelto     Vitals:         VS:   Visit Vitals  /67   Pulse (!) 114 Comment: Pt just ambulated from BR and was coughing   Temp 98.4 °F (36.9 °C)   Resp 20   SpO2 94%      Tmax/24hrs: Temp (24hrs), Av.5 °F (36.9 °C), Min:98.4 °F (36.9 °C), Max:98.7 °F (37.1 °C)        Medications:   Current Facility-Administered Medications   Medication Dose Route Frequency    sodium chloride (NS) flush 5-40 mL  5-40 mL IntraVENous Q8H    sodium chloride (NS) flush 5-40 mL  5-40 mL IntraVENous PRN    acetaminophen (TYLENOL) tablet 650 mg  650 mg Oral Q6H PRN    Or    acetaminophen (TYLENOL) suppository 650 mg  650 mg Rectal Q6H PRN    polyethylene glycol (MIRALAX) packet 17 g  17 g Oral DAILY PRN    ondansetron (ZOFRAN ODT) tablet 4 mg  4 mg Oral Q8H PRN    Or    ondansetron (ZOFRAN) injection 4 mg  4 mg IntraVENous Q6H PRN    heparin (porcine) injection 5,000 Units  5,000 Units SubCUTAneous Q8H    insulin lispro (HUMALOG) injection   SubCUTAneous AC&HS    glucose chewable tablet 16 g  4 Tablet Oral PRN    glucagon (GLUCAGEN) injection 1 mg  1 mg IntraMUSCular PRN    dextrose 10% infusion 0-250 mL  0-250 mL IntraVENous PRN    atorvastatin (LIPITOR) tablet 80 mg  80 mg Oral DAILY    carvediloL (COREG) tablet 25 mg  25 mg Oral BID WITH MEALS    ezetimibe (ZETIA) tablet 10 mg  10 mg Oral DAILY    aspirin chewable tablet 81 mg  81 mg Oral DAILY    arformoteroL (BROVANA) neb solution 15 mcg  15 mcg Nebulization BID RT    albuterol-ipratropium (DUO-NEB) 2.5 MG-0.5 MG/3 ML  3 mL Nebulization Q4H RT       Labs:    Recent Labs     07/08/22  0953   WBC 8.0   HGB 9.9*   HCT 33.2*        Recent Labs     07/08/22  0953      K 5.8*      CO2 25   *   BUN 23*   CREA 1.81*   CA 9.3   MG 2.0         Time spent on direct patient care >30 mints     Complexity : High complex - due to multiple medical issues outlined above. CODE Status : Full code  Case discussed with:  []Patient  [] Family  []Nursing  []Case Management         Disclaimer: Sections of this note are dictated utilizing voice recognition software, which may have resulted in some phonetic based errors in grammar and contents. Even though attempts were made to correct all the mistakes, some may have been missed, and remained in the body of the document. If questions arise, please contact our department.     Signed By: Williams Guaman MD     July 9, 2022

## 2022-07-09 NOTE — PROGRESS NOTES
Pt states that her pain is the same and that the doctor told her that he believes it is fluid causing the pain and that he was going to give her lasix. Per the nephrologist's note, he has conferred with the cardiologist and has chosen to hold off on diuresing the patient at this time. Will continue to monitor. Pt states pain is the same and tolerable at a 10/10.

## 2022-07-09 NOTE — ROUTINE PROCESS
Bedside shift change report given to DeepField Codie RN (oncoming nurse) by Omid Galvin RN (offgoing nurse). Report included the following information SBAR, Kardex, Intake/Output and Recent Results.

## 2022-07-09 NOTE — ROUTINE PROCESS
Wound Prevention Checklist    Patient: Lisa Flaherty (59 y.o. female)  Date: 7/9/2022  Diagnosis: Pulmonary hypertension (HCC) [I27.20]  Hyperkalemia [E87.5]  Acute exacerbation of CHF (congestive heart failure) ([facility] [I50.9] <principal problem not specified>    Precautions:         []  Heel prevention boots placed on patient    []  Patient turned q2h during shift    []  Lift team ordered    [x]  Patient on [place] bed/Specialty bed    []  Each Wound is documented during shift (Stage, Color, drainage, odor, measurements, and dressings)    [x]  Dual skin checks done at bedside during shift report with FLORENCIA Mauro RN

## 2022-07-09 NOTE — PROGRESS NOTES
Progress  Note    27-year-old female with past medical history of diabetes, hypertension, congestive heart failure, CKD admitted for short of breath, following for renal failure  Subjective      Overnight event noted  Complaining cough,   No fever  No documented urine output. IMPRESSION:   Acute on chronic kidney injury, suspect cardiorenal with presentation, other diferrential would be viral pneumonitis ,  CTA negative for PE  CKD stage IV with proteinuria Baseline creatinine around 1.3 to 1.5 mg per DL  Heart failure with preserved ejection fraction, no pulmonary hypertension  Lower extremity edema  Diabetes  Secondary hyperparathyroidism CKD   PLAN:   Her creatinine trending upward today. Case discussed with cardiology colleague, agree with concern with viral pneumonia, with CT and cxr finding. I would hold diuretics today , okay with diuretics if worsening of symptoms. Following closely.       Facility-Administered Medications: None       Current Facility-Administered Medications   Medication Dose Route Frequency    sodium chloride (NS) flush 5-40 mL  5-40 mL IntraVENous Q8H    sodium chloride (NS) flush 5-40 mL  5-40 mL IntraVENous PRN    acetaminophen (TYLENOL) tablet 650 mg  650 mg Oral Q6H PRN    Or    acetaminophen (TYLENOL) suppository 650 mg  650 mg Rectal Q6H PRN    polyethylene glycol (MIRALAX) packet 17 g  17 g Oral DAILY PRN    ondansetron (ZOFRAN ODT) tablet 4 mg  4 mg Oral Q8H PRN    Or    ondansetron (ZOFRAN) injection 4 mg  4 mg IntraVENous Q6H PRN    heparin (porcine) injection 5,000 Units  5,000 Units SubCUTAneous Q8H    insulin lispro (HUMALOG) injection   SubCUTAneous AC&HS    glucose chewable tablet 16 g  4 Tablet Oral PRN    glucagon (GLUCAGEN) injection 1 mg  1 mg IntraMUSCular PRN    dextrose 10% infusion 0-250 mL  0-250 mL IntraVENous PRN    atorvastatin (LIPITOR) tablet 80 mg  80 mg Oral DAILY    carvediloL (COREG) tablet 25 mg  25 mg Oral BID WITH MEALS    ezetimibe (ZETIA) tablet 10 mg  10 mg Oral DAILY    aspirin chewable tablet 81 mg  81 mg Oral DAILY    arformoteroL (BROVANA) neb solution 15 mcg  15 mcg Nebulization BID RT    albuterol-ipratropium (DUO-NEB) 2.5 MG-0.5 MG/3 ML  3 mL Nebulization Q4H RT       Review of Systems:     As above   Data Review:    Labs: Results:       Chemistry Recent Labs     07/09/22  1132 07/08/22  0953   * 141*    139   K 5.5 5.8*    109   CO2 22 25   BUN 33* 23*   CREA 2.12* 1.81*   CA 9.1 9.3   AGAP 7 5   BUCR 16 13      CBC w/Diff Recent Labs     07/08/22  0953   WBC 8.0   RBC 3.66*   HGB 9.9*   HCT 33.2*      GRANS 73   LYMPH 18*   EOS 1      Coagulation No results for input(s): PTP, INR, APTT, INREXT, INREXT in the last 72 hours. Iron/Ferritin No results for input(s): IRON in the last 72 hours. No lab exists for component: TIBCCALC   BNP No results for input(s): BNPP in the last 72 hours. Cardiac Enzymes No results for input(s): CPK, CKND1, GODFREY in the last 72 hours. No lab exists for component: CKRMB, TROIP   Liver Enzymes No results for input(s): TP, ALB, TBIL, AP in the last 72 hours.     No lab exists for component: SGOT, GPT, DBIL   Thyroid Studies Lab Results   Component Value Date/Time    TSH 0.21 (L) 03/19/2021 04:00 AM         EKG: Sinus    Physical Assessment:     Visit Vitals  BP (!) 144/56   Pulse 99   Temp 99.3 °F (37.4 °C)   Resp 20   SpO2 96%     Weight change:   No intake or output data in the 24 hours ending 07/09/22 1358  Physical Exam:   General: comfortable, no acute distress   HEENT sclera anicteric, supple neck, no thyromegaly  CVS: S1S2 heard,  no rub  RS: + air entry b/l,   Abd: Soft, Non tender, Not distended, Positive bowel sounds, no organomegaly, no CVA / supra pubic tenderness  Neuro: non focal, awake, alert , CN II-XII are grossly intact  Extrm: ++edema, no cyanosis, clubbing   Skin: no visible  Rash  Musculoskeletal: No gross joints or bone deformities Procedures/imaging: see electronic medical records for all procedures, Xrays and details which were not copied into this note but were reviewed prior to creation of Whitney Lindsey MD  July 9, 2022  [facility]  Office [phone]

## 2022-07-09 NOTE — PROGRESS NOTES
Dr. Jonas Driscoll informed of pt's refusal of anticoagulation therapy. Was advised to document, \"refused\". Will continue to monitor.

## 2022-07-09 NOTE — ROUTINE PROCESS
TRANSFER - OUT REPORT:    Verbal report given to [facility] on Virgilio Perez  being transferred to **4N 451/01*(unit) for {TRANSFER CARE:55327}       Report consisted of patients Situation, Background, Assessment and   Recommendations(SBAR). Information from the following report(s) {SBAR REPORTS TIQY:14048} was reviewed with the receiving nurse. Lines:   Peripheral IV 07/08/22 (Active)   Site Assessment Clean, dry, & intact 07/08/22 1004   Phlebitis Assessment 0 07/08/22 1004   Infiltration Assessment 0 07/08/22 1004   Dressing Status Clean, dry, & intact 07/08/22 1004   Dressing Type Tape;Transparent 07/08/22 1004   Hub Color/Line Status Pink;Flushed;Patent; Positional 07/08/22 1004       Peripheral IV 07/08/22 Left Antecubital (Active)   Site Assessment Clean, dry, & intact 07/08/22 1300   Phlebitis Assessment 0 07/08/22 1300   Infiltration Assessment 0 07/08/22 1300   Dressing Status Clean, dry, & intact 07/08/22 1300   Hub Color/Line Status Pink 07/08/22 1300        Opportunity for questions and clarification was provided.       Patient transported with:   {TRANSPORTDETAILS:11592}

## 2022-07-10 LAB
ANION GAP SERPL CALC-SCNC: 4 MMOL/L (ref 3–18)
BUN SERPL-MCNC: 46 MG/DL (ref 7–18)
BUN/CREAT SERPL: 22 (ref 12–20)
CALCIUM SERPL-MCNC: 8.7 MG/DL (ref 8.5–10.1)
CHLORIDE SERPL-SCNC: 113 MMOL/L (ref 100–111)
CO2 SERPL-SCNC: 25 MMOL/L (ref 21–32)
CREAT SERPL-MCNC: 2.09 MG/DL (ref 0.6–1.3)
GLUCOSE BLD STRIP.AUTO-MCNC: 123 MG/DL (ref 70–110)
GLUCOSE BLD STRIP.AUTO-MCNC: 132 MG/DL (ref 70–110)
GLUCOSE BLD STRIP.AUTO-MCNC: 145 MG/DL (ref 70–110)
GLUCOSE BLD STRIP.AUTO-MCNC: 160 MG/DL (ref 70–110)
GLUCOSE SERPL-MCNC: 124 MG/DL (ref 74–99)
POTASSIUM SERPL-SCNC: 5.4 MMOL/L (ref 3.5–5.5)
SODIUM SERPL-SCNC: 142 MMOL/L (ref 136–145)

## 2022-07-10 PROCEDURE — 65270000046 HC RM TELEMETRY

## 2022-07-10 PROCEDURE — 99232 SBSQ HOSP IP/OBS MODERATE 35: CPT | Performed by: FAMILY MEDICINE

## 2022-07-10 PROCEDURE — 80048 BASIC METABOLIC PNL TOTAL CA: CPT

## 2022-07-10 PROCEDURE — 94640 AIRWAY INHALATION TREATMENT: CPT

## 2022-07-10 PROCEDURE — 82962 GLUCOSE BLOOD TEST: CPT

## 2022-07-10 PROCEDURE — 74011250637 HC RX REV CODE- 250/637: Performed by: NURSE PRACTITIONER

## 2022-07-10 PROCEDURE — 74011000250 HC RX REV CODE- 250: Performed by: FAMILY MEDICINE

## 2022-07-10 PROCEDURE — 74011250637 HC RX REV CODE- 250/637: Performed by: INTERNAL MEDICINE

## 2022-07-10 PROCEDURE — 74011636637 HC RX REV CODE- 636/637: Performed by: NURSE PRACTITIONER

## 2022-07-10 PROCEDURE — 74011250637 HC RX REV CODE- 250/637: Performed by: FAMILY MEDICINE

## 2022-07-10 PROCEDURE — 99233 SBSQ HOSP IP/OBS HIGH 50: CPT | Performed by: INTERNAL MEDICINE

## 2022-07-10 PROCEDURE — 94761 N-INVAS EAR/PLS OXIMETRY MLT: CPT

## 2022-07-10 PROCEDURE — 74011250637 HC RX REV CODE- 250/637: Performed by: STUDENT IN AN ORGANIZED HEALTH CARE EDUCATION/TRAINING PROGRAM

## 2022-07-10 PROCEDURE — 36415 COLL VENOUS BLD VENIPUNCTURE: CPT

## 2022-07-10 PROCEDURE — 74011000250 HC RX REV CODE- 250: Performed by: NURSE PRACTITIONER

## 2022-07-10 RX ORDER — HYDROCODONE BITARTRATE AND ACETAMINOPHEN 5; 325 MG/1; MG/1
1-2 TABLET ORAL
Status: DISCONTINUED | OUTPATIENT
Start: 2022-07-10 | End: 2022-07-11 | Stop reason: HOSPADM

## 2022-07-10 RX ORDER — GUAIFENESIN/DEXTROMETHORPHAN 100-10MG/5
10 SYRUP ORAL
Status: DISCONTINUED | OUTPATIENT
Start: 2022-07-10 | End: 2022-07-11 | Stop reason: HOSPADM

## 2022-07-10 RX ADMIN — SODIUM CHLORIDE, PRESERVATIVE FREE 10 ML: 5 INJECTION INTRAVENOUS at 05:46

## 2022-07-10 RX ADMIN — CARVEDILOL 25 MG: 25 TABLET, FILM COATED ORAL at 08:09

## 2022-07-10 RX ADMIN — CARVEDILOL 25 MG: 25 TABLET, FILM COATED ORAL at 16:26

## 2022-07-10 RX ADMIN — IPRATROPIUM BROMIDE AND ALBUTEROL SULFATE 3 ML: .5; 2.5 SOLUTION RESPIRATORY (INHALATION) at 00:11

## 2022-07-10 RX ADMIN — EZETIMIBE 10 MG: 10 TABLET ORAL at 08:09

## 2022-07-10 RX ADMIN — SODIUM CHLORIDE, PRESERVATIVE FREE 10 ML: 5 INJECTION INTRAVENOUS at 14:35

## 2022-07-10 RX ADMIN — SODIUM CHLORIDE, PRESERVATIVE FREE 10 ML: 5 INJECTION INTRAVENOUS at 22:38

## 2022-07-10 RX ADMIN — ATORVASTATIN CALCIUM 80 MG: 40 TABLET, FILM COATED ORAL at 08:09

## 2022-07-10 RX ADMIN — IPRATROPIUM BROMIDE AND ALBUTEROL SULFATE 3 ML: .5; 2.5 SOLUTION RESPIRATORY (INHALATION) at 08:09

## 2022-07-10 RX ADMIN — IPRATROPIUM BROMIDE AND ALBUTEROL SULFATE 3 ML: .5; 2.5 SOLUTION RESPIRATORY (INHALATION) at 15:43

## 2022-07-10 RX ADMIN — ASPIRIN 81 MG CHEWABLE TABLET 81 MG: 81 TABLET CHEWABLE at 08:09

## 2022-07-10 RX ADMIN — Medication 2 UNITS: at 12:02

## 2022-07-10 RX ADMIN — ARFORMOTEROL TARTRATE 15 MCG: 15 SOLUTION RESPIRATORY (INHALATION) at 21:15

## 2022-07-10 RX ADMIN — IPRATROPIUM BROMIDE AND ALBUTEROL SULFATE 3 ML: .5; 2.5 SOLUTION RESPIRATORY (INHALATION) at 21:14

## 2022-07-10 RX ADMIN — IPRATROPIUM BROMIDE AND ALBUTEROL SULFATE 3 ML: .5; 2.5 SOLUTION RESPIRATORY (INHALATION) at 12:01

## 2022-07-10 RX ADMIN — ARFORMOTEROL TARTRATE 15 MCG: 15 SOLUTION RESPIRATORY (INHALATION) at 08:09

## 2022-07-10 RX ADMIN — GUAIFENESIN AND DEXTROMETHORPHAN 10 ML: 100; 10 SYRUP ORAL at 14:35

## 2022-07-10 RX ADMIN — HYDROCODONE BITARTRATE AND ACETAMINOPHEN 1 TABLET: 5; 325 TABLET ORAL at 14:33

## 2022-07-10 RX ADMIN — HYDROCODONE BITARTRATE AND ACETAMINOPHEN 1 TABLET: 5; 325 TABLET ORAL at 21:32

## 2022-07-10 RX ADMIN — Medication 1 LOZENGE: at 00:19

## 2022-07-10 NOTE — PROGRESS NOTES
Problem: Diabetes Self-Management  Goal: *Disease process and treatment process  Description: Define diabetes and identify own type of diabetes; list 3 options for treating diabetes. Outcome: Progressing Towards Goal  Goal: *Incorporating nutritional management into lifestyle  Description: Describe effect of type, amount and timing of food on blood glucose; list 3 methods for planning meals. Outcome: Progressing Towards Goal  Goal: *Incorporating physical activity into lifestyle  Description: State effect of exercise on blood glucose levels. Outcome: Progressing Towards Goal  Goal: *Developing strategies to promote health/change behavior  Description: Define the ABC's of diabetes; identify appropriate screenings, schedule and personal plan for screenings. Outcome: Progressing Towards Goal  Goal: *Using medications safely  Description: State effect of diabetes medications on diabetes; name diabetes medication taking, action and side effects. Outcome: Progressing Towards Goal  Goal: *Monitoring blood glucose, interpreting and using results  Description: Identify recommended blood glucose targets  and personal targets. Outcome: Progressing Towards Goal  Goal: *Prevention, detection, treatment of acute complications  Description: List symptoms of hyper- and hypoglycemia; describe how to treat low blood sugar and actions for lowering  high blood glucose level. Outcome: Progressing Towards Goal  Goal: *Prevention, detection and treatment of chronic complications  Description: Define the natural course of diabetes and describe the relationship of blood glucose levels to long term complications of diabetes.   Outcome: Progressing Towards Goal  Goal: *Developing strategies to address psychosocial issues  Description: Describe feelings about living with diabetes; identify support needed and support network  Outcome: Progressing Towards Goal  Goal: *Insulin pump training  Outcome: Progressing Towards Goal  Goal: *Sick day guidelines  Outcome: Progressing Towards Goal  Goal: *Patient Specific Goal (EDIT GOAL, INSERT TEXT)  Outcome: Progressing Towards Goal     Problem: Patient Education: Go to Patient Education Activity  Goal: Patient/Family Education  Outcome: Progressing Towards Goal     Problem: Falls - Risk of  Goal: *Absence of Falls  Description: Document Earma Neri Fall Risk and appropriate interventions in the flowsheet.   Outcome: Progressing Towards Goal  Note: Fall Risk Interventions:            Medication Interventions: Patient to call before getting OOB                   Problem: Patient Education: Go to Patient Education Activity  Goal: Patient/Family Education  Outcome: Progressing Towards Goal

## 2022-07-10 NOTE — PROGRESS NOTES
[facility]  [facility]    Patient: Barbara Arizmendi Age: 81 y.o. : 1950 MR#: [id number] SSN: [id number]  Date: 7/10/2022     Subjective/24-hour events:     Continues to complain of some cough and right-sided chest wall pain. Assessment:   Shortness of breath, possible pneumonitis vs CHF  Multiple new Ill-defined airspace nodules, concern for inflammatory vs infectious etiology  Mild acute on chronic systolic CHF  DM2  Chronic anemia  Hyperkalemia  Severe obesity, BMI 36.73    Plan:   Continue to monitor respiratory status. Remained stable, no need for supplemental oxygen. Not on antibiotics currently, suspect viral process. Diuresis on hold for now given elevated creatinine. Nephrology following. Coreg/statin/aspirin as ordered. Mobilize as tolerated. Analgesia adjusted for attempted better pain control per patient request.  Follow. Case discussed with:  [x]Patient  []Family  [x]Nursing  []Case Management  DVT Prophylaxis:  []Lovenox  [x]Hep SQ  []SCDs  []Coumadin   []On Heparin gtt    Objective:   VS:   Visit Vitals  BP (!) 128/56 (BP 1 Location: Right lower arm, BP Patient Position: At rest)   Pulse 81 Comment: 81   Temp 98.7 °F (37.1 °C)   Resp 16   SpO2 95%      Tmax/24hrs: Temp (24hrs), Av.8 °F (37.1 °C), Min:98.7 °F (37.1 °C), Max:99.3 °F (37.4 °C)      Intake/Output Summary (Last 24 hours) at 7/10/2022 1023  Last data filed at 7/10/2022 0344  Gross per 24 hour   Intake --   Output 800 ml   Net -800 ml       General:  In NAD. Nontoxic-appearing. Cardiovascular:  RRR. Pulmonary: No active wheezing, effort nonlabored. GI:  Abdomen soft, NTTP. Extremities:  Warm, no edema or ischemia. Neuro:  Awake and alert.   Moves extremities spontaneously, motor grossly nonfocal.    Labs:    Recent Results (from the past 24 hour(s))   GLUCOSE, POC    Collection Time: 22 11:07 AM   Result Value Ref Range    Glucose (POC) 228 (H) 70 - 110 mg/dL METABOLIC PANEL, BASIC    Collection Time: 07/09/22 11:32 AM   Result Value Ref Range    Sodium 139 136 - 145 mmol/L    Potassium 5.5 3.5 - 5.5 mmol/L    Chloride 110 100 - 111 mmol/L    CO2 22 21 - 32 mmol/L    Anion gap 7 3.0 - 18 mmol/L    Glucose 196 (H) 74 - 99 mg/dL    BUN 33 (H) 7.0 - 18 MG/DL    Creatinine 2.12 (H) 0.6 - 1.3 MG/DL    BUN/Creatinine ratio 16 12 - 20      GFR est AA 28 (L) >60 ml/min/1.73m2    GFR est non-AA 23 (L) >60 ml/min/1.73m2    Calcium 9.1 8.5 - 10.1 MG/DL   GLUCOSE, POC    Collection Time: 07/09/22 12:33 PM   Result Value Ref Range    Glucose (POC) 137 (H) 70 - 110 mg/dL   GLUCOSE, POC    Collection Time: 07/09/22  3:58 PM   Result Value Ref Range    Glucose (POC) 116 (H) 70 - 110 mg/dL   GLUCOSE, POC    Collection Time: 07/09/22 11:07 PM   Result Value Ref Range    Glucose (POC) 137 (H) 70 - 325 mg/dL   METABOLIC PANEL, BASIC    Collection Time: 07/10/22  5:09 AM   Result Value Ref Range    Sodium 142 136 - 145 mmol/L    Potassium 5.4 3.5 - 5.5 mmol/L    Chloride 113 (H) 100 - 111 mmol/L    CO2 25 21 - 32 mmol/L    Anion gap 4 3.0 - 18 mmol/L    Glucose 124 (H) 74 - 99 mg/dL    BUN 46 (H) 7.0 - 18 MG/DL    Creatinine 2.09 (H) 0.6 - 1.3 MG/DL    BUN/Creatinine ratio 22 (H) 12 - 20      GFR est AA 28 (L) >60 ml/min/1.73m2    GFR est non-AA 23 (L) >60 ml/min/1.73m2    Calcium 8.7 8.5 - 10.1 MG/DL   GLUCOSE, POC    Collection Time: 07/10/22  7:32 AM   Result Value Ref Range    Glucose (POC) 132 (H) 70 - 110 mg/dL       Signed By: Silvestre Jackson MD     July 10, 2022

## 2022-07-10 NOTE — PROGRESS NOTES
Progress  Note    68-year-old female with past medical history of diabetes, hypertension, congestive heart failure, CKD admitted for short of breath, following for renal failure  Subjective      Overnight event noted  NO improvement in her cough. IMPRESSION:   Acute on chronic kidney injury, suspect cardiorenal with presentation, other diferrential would be viral pneumonitis ,  CTA negative for PE  CKD stage IV with proteinuria Baseline creatinine around 1.3 to 1.5 mg per DL  Heart failure with preserved ejection fraction, no pulmonary hypertension  Lower extremity edema  Diabetes  Secondary hyperparathyroidism CKD   PLAN:   Her creatinine remain on higher side likely effect from iv contrast. Continue to hold diuretics. Following closely.       Facility-Administered Medications: None       Current Facility-Administered Medications   Medication Dose Route Frequency    guaiFENesin-dextromethorphan (ROBITUSSIN DM) 100-10 mg/5 mL syrup 10 mL  10 mL Oral Q6H PRN    menthol 5 MG 1 Lozenge  1 Lozenge Mucous Membrane PRN    sodium chloride (NS) flush 5-40 mL  5-40 mL IntraVENous Q8H    sodium chloride (NS) flush 5-40 mL  5-40 mL IntraVENous PRN    acetaminophen (TYLENOL) tablet 650 mg  650 mg Oral Q6H PRN    Or    acetaminophen (TYLENOL) suppository 650 mg  650 mg Rectal Q6H PRN    polyethylene glycol (MIRALAX) packet 17 g  17 g Oral DAILY PRN    ondansetron (ZOFRAN ODT) tablet 4 mg  4 mg Oral Q8H PRN    Or    ondansetron (ZOFRAN) injection 4 mg  4 mg IntraVENous Q6H PRN    heparin (porcine) injection 5,000 Units  5,000 Units SubCUTAneous Q8H    insulin lispro (HUMALOG) injection   SubCUTAneous AC&HS    glucose chewable tablet 16 g  4 Tablet Oral PRN    glucagon (GLUCAGEN) injection 1 mg  1 mg IntraMUSCular PRN    dextrose 10% infusion 0-250 mL  0-250 mL IntraVENous PRN    atorvastatin (LIPITOR) tablet 80 mg  80 mg Oral DAILY    carvediloL (COREG) tablet 25 mg  25 mg Oral BID WITH MEALS    ezetimibe (ZETIA) tablet 10 mg  10 mg Oral DAILY    aspirin chewable tablet 81 mg  81 mg Oral DAILY    arformoteroL (BROVANA) neb solution 15 mcg  15 mcg Nebulization BID RT    albuterol-ipratropium (DUO-NEB) 2.5 MG-0.5 MG/3 ML  3 mL Nebulization Q4H RT       Review of Systems:     As above   Data Review:    Labs: Results:       Chemistry Recent Labs     07/10/22  0509 07/09/22  1132 07/08/22  0953   * 196* 141*    139 139   K 5.4 5.5 5.8*   * 110 109   CO2 25 22 25   BUN 46* 33* 23*   CREA 2.09* 2.12* 1.81*   CA 8.7 9.1 9.3   AGAP 4 7 5   BUCR 22* 16 13      CBC w/Diff Recent Labs     07/08/22  0953   WBC 8.0   RBC 3.66*   HGB 9.9*   HCT 33.2*      GRANS 73   LYMPH 18*   EOS 1      Coagulation No results for input(s): PTP, INR, APTT, INREXT, INREXT in the last 72 hours. Iron/Ferritin No results for input(s): IRON in the last 72 hours. No lab exists for component: TIBCCALC   BNP No results for input(s): BNPP in the last 72 hours. Cardiac Enzymes No results for input(s): CPK, CKND1, GODFREY in the last 72 hours. No lab exists for component: CKRMB, TROIP   Liver Enzymes No results for input(s): TP, ALB, TBIL, AP in the last 72 hours.     No lab exists for component: SGOT, GPT, DBIL   Thyroid Studies Lab Results   Component Value Date/Time    TSH 0.21 (L) 03/19/2021 04:00 AM         EKG: Sinus    Physical Assessment:     Visit Vitals  BP (!) 142/74 (BP 1 Location: Right lower arm, BP Patient Position: At rest)   Pulse 81   Temp 98.4 °F (36.9 °C)   Resp 19   SpO2 97%     Weight change:     Intake/Output Summary (Last 24 hours) at 7/10/2022 1154  Last data filed at 7/10/2022 0344  Gross per 24 hour   Intake --   Output 800 ml   Net -800 ml     Physical Exam:   General: comfortable, no acute distress   HEENT sclera anicteric, supple neck, no thyromegaly  CVS: S1S2 heard,  no rub  RS: + air entry b/l,   Abd: Soft, Non tender, Not distended, Positive bowel sounds, no organomegaly, no CVA / supra pubic tenderness  Neuro: non focal, awake, alert , CN II-XII are grossly intact  Extrm: ++edema, no cyanosis, clubbing   Skin: no visible  Rash  Musculoskeletal: No gross joints or bone deformities     Procedures/imaging: see electronic medical records for all procedures, Xrays and details which were not copied into this note but were reviewed prior to creation of Vera Paz MD  July 10, 2022  [facility]  Office [phone]

## 2022-07-10 NOTE — PROGRESS NOTES
Problem: Diabetes Self-Management  Goal: *Disease process and treatment process  Description: Define diabetes and identify own type of diabetes; list 3 options for treating diabetes. Outcome: Progressing Towards Goal     Problem: Patient Education: Go to Patient Education Activity  Goal: Patient/Family Education  Outcome: Progressing Towards Goal     Problem: Falls - Risk of  Goal: *Absence of Falls  Description: Document Lawrence Rivers Fall Risk and appropriate interventions in the flowsheet.   Outcome: Progressing Towards Goal  Note: Fall Risk Interventions:            Medication Interventions: Patient to call before getting OOB,Teach patient to arise slowly

## 2022-07-10 NOTE — ROUTINE PROCESS
End of Shift    Patient: Dianelys Carter (6 y.o. female)  Date: 7/10/2022  Diagnosis: Pulmonary hypertension (HCC) [I27.20]  Hyperkalemia [E87.5]  Acute exacerbation of CHF (congestive heart failure) (HCC) [I50.9] <principal problem not specified>    Precautions:      Bedside verbal report given to Bryanna Louie (oncoming nurse) from FLORENCIA Lopez (outgoing nurse)    Report consisted of patients Situation, Background, Assessment and   Recommendations(SBAR). Information from the following report(s): Kardex, MAR and Recent Results was reviewed with the oncoming nurse. Opportunity for questions and clarification was provided.         Wound Prevention Checklist    []  Heel prevention boots placed on patient    []  Patient turned q2h during shift    []  Lift team ordered    []  Patient on [place] bed/Specialty bed    []  Each Wound is documented during shift (Stage, Color, drainage, odor, measurements, and dressings)    [x]  Dual skin checks done at bedside during shift report with [facility] FLORENCIA De Lo sSantos RN

## 2022-07-10 NOTE — ROUTINE PROCESS
Wound Prevention Checklist    Patient: Benjamín Whitehead (81 y.o. female)  Date: 7/10/2022  Diagnosis: Pulmonary hypertension (HCC) [I27.20]  Hyperkalemia [E87.5]  Acute exacerbation of CHF (congestive heart failure) ([facility] [address] [I50.9] <principal problem not specified>    Precautions:         []  Heel prevention boots placed on patient    []  Patient turned q2h during shift    []  Lift team ordered    []  Patient on [place] bed/Specialty bed    []  Each Wound is documented during shift (Stage, Color, drainage, odor, measurements, and dressings)    [x]  Dual skin checks done at bedside during shift report with Stanislaw Reddy RN     Bedside and Verbal shift change report given to [facility] (oncoming nurse) by Poli Anderson RN   (offgoing nurse). Report included the following information SBAR, Kardex, Intake/Output, MAR and Recent Results.

## 2022-07-10 NOTE — PROGRESS NOTES
Problem: Diabetes Self-Management  Goal: *Disease process and treatment process  Description: Define diabetes and identify own type of diabetes; list 3 options for treating diabetes. Outcome: Progressing Towards Goal     Problem: Patient Education: Go to Patient Education Activity  Goal: Patient/Family Education  Outcome: Progressing Towards Goal     Problem: Falls - Risk of  Goal: *Absence of Falls  Description: Document Parminder Barajas Fall Risk and appropriate interventions in the flowsheet.   Outcome: Progressing Towards Goal  Note: Fall Risk Interventions:            Medication Interventions: Patient to call before getting OOB                   Problem: Patient Education: Go to Patient Education Activity  Goal: Patient/Family Education  Outcome: Progressing Towards Goal

## 2022-07-10 NOTE — PROGRESS NOTES
Cardiology Progress Note    Admit Date: 7/8/2022  Attending Cardiologist: Dr. Billy Bear:     Hospital Problems  Date Reviewed: 4/26/2021          Codes Class Noted POA    Pulmonary hypertension ([facility] ICD-10-CM: I27.20  ICD-9-CM: 416.8  7/8/2022 Unknown        Acute exacerbation of CHF (congestive heart failure) ([facility] [address] ICD-10-CM: I50.9  ICD-9-CM: 428.0  7/8/2022 Unknown        Hyperkalemia ICD-10-CM: E87.5  ICD-9-CM: 276.7  3/7/2022 Unknown               1.  Shortness of breath multifactorial possible CHF bu history of t clinically appears more to be pneumonitis her CTA and chest x-ray are more suggestive of that. And concern about viral pneumonitis in this patient. 2.  Cardiomyopathy with mildly reduced systolic function  3. Acute on chronic chronic systolic CHF with mildly reduced systolic function. Patient had edema on admission which has improved. I do not see any clinical sign of fluid overload at present. Agree with holding diuresis particularly she is getting contrast  4. Recent admission with hyperkalemia. Currently renal on board  5. CAD with prior coronary stent and clinically appears stable. No clinical sign of ACS. Troponin is negative  6. Elevated NT BNP pro likely combination of renal insufficiency and CHF  7. Acute on chronic renal insufficiency admission creatinine and GFR are worse compared to discharge. Continue monitoring and follow-up with renal.   8. no evidence of pulmonary hypertension reported on recent echo.          Plan:     Continue treatment directed at pneumonitis and viral syndrome  Hold diuretic  Monitor renal function is. Is getting worse. Hydration as needed BP monitoring for fluid overload  Symptomatic treatment    Subjective:     Continues to have sore throat and congestion. Chest wall pain when she coughs.     Objective:      Patient Vitals for the past 8 hrs:   Temp Pulse Resp BP SpO2   07/10/22 1102 98.4 °F (36.9 °C) 81 19 (!) 142/74 97 %   07/10/22 0800 -- 81 -- -- --   07/10/22 0728 98.7 °F (37.1 °C) 81 16 (!) 128/56 95 %   07/10/22 0344 98.7 °F (37.1 °C) 83 20 (!) 147/68 100 %         No data found.     TELE: normal sinus rhythm               Current Facility-Administered Medications   Medication Dose Route Frequency Last Admin    menthol 5 MG 1 Lozenge  1 Lozenge Mucous Membrane PRN 1 Lozenge at 07/10/22 0019    sodium chloride (NS) flush 5-40 mL  5-40 mL IntraVENous Q8H 10 mL at 07/10/22 0546    sodium chloride (NS) flush 5-40 mL  5-40 mL IntraVENous PRN      acetaminophen (TYLENOL) tablet 650 mg  650 mg Oral Q6H  mg at 07/09/22 1025    Or    acetaminophen (TYLENOL) suppository 650 mg  650 mg Rectal Q6H PRN      polyethylene glycol (MIRALAX) packet 17 g  17 g Oral DAILY PRN      ondansetron (ZOFRAN ODT) tablet 4 mg  4 mg Oral Q8H PRN      Or    ondansetron (ZOFRAN) injection 4 mg  4 mg IntraVENous Q6H PRN      heparin (porcine) injection 5,000 Units  5,000 Units SubCUTAneous Q8H      insulin lispro (HUMALOG) injection   SubCUTAneous AC&HS 6 Units at 07/09/22 1024    glucose chewable tablet 16 g  4 Tablet Oral PRN      glucagon (GLUCAGEN) injection 1 mg  1 mg IntraMUSCular PRN      dextrose 10% infusion 0-250 mL  0-250 mL IntraVENous PRN      atorvastatin (LIPITOR) tablet 80 mg  80 mg Oral DAILY 80 mg at 07/10/22 0809    carvediloL (COREG) tablet 25 mg  25 mg Oral BID WITH MEALS 25 mg at 07/10/22 0809    ezetimibe (ZETIA) tablet 10 mg  10 mg Oral DAILY 10 mg at 07/10/22 0809    aspirin chewable tablet 81 mg  81 mg Oral DAILY 81 mg at 07/10/22 0809    arformoteroL (BROVANA) neb solution 15 mcg  15 mcg Nebulization BID RT 15 mcg at 07/10/22 0809    albuterol-ipratropium (DUO-NEB) 2.5 MG-0.5 MG/3 ML  3 mL Nebulization Q4H RT 3 mL at 07/10/22 0809         Intake/Output Summary (Last 24 hours) at 7/10/2022 1121  Last data filed at 7/10/2022 0344  Gross per 24 hour   Intake --   Output 800 ml   Net -800 ml       Physical Exam:  General: alert, cooperative, no distress, appears stated age  Neck:  nontender, no carotid bruit, no JVD  Lungs:  rhonchi scattered bilateral good air entry no rales  Heart:  regular rate and rhythm, S1, S2 normal, no murmur, click, rub or gallop  Abdomen:  abdomen is soft without significant tenderness, masses, organomegaly or guarding  Extremities:  extremities normal, atraumatic, no cyanosis or edema    Visit Vitals  BP (!) 142/74 (BP 1 Location: Right lower arm, BP Patient Position: At rest)   Pulse 81   Temp 98.4 °F (36.9 °C)   Resp 19   SpO2 97%       Data Review:     Labs: Results:       Chemistry Recent Labs     07/10/22  0509 07/09/22  1132 07/08/22  0953   * 196* 141*    139 139   K 5.4 5.5 5.8*   * 110 109   CO2 25 22 25   BUN 46* 33* 23*   CREA 2.09* 2.12* 1.81*   CA 8.7 9.1 9.3   MG  --   --  2.0   AGAP 4 7 5   BUCR 22* 16 13      CBC w/Diff Recent Labs     07/08/22  0953   WBC 8.0   RBC 3.66*   HGB 9.9*   HCT 33.2*      GRANS 73   LYMPH 18*   EOS 1      Cardiac Enzymes No results found for: CPK, CK, CKMMB, CKMB, RCK3, CKMBT, CKNDX, CKND1, GODFREY, TROPT, TROIQ, SANG, TROPT, TNIPOC, BNP, BNPP   Coagulation No results for input(s): PTP, INR, APTT, INREXT in the last 72 hours. Lipid Panel Lab Results   Component Value Date/Time    Cholesterol, total 152 10/10/2019 12:00 AM    HDL Cholesterol 46 10/10/2019 12:00 AM    LDL, calculated 86 10/10/2019 12:00 AM    VLDL, calculated 20 10/10/2019 12:00 AM    Triglyceride 101 10/10/2019 12:00 AM    CHOL/HDL Ratio 5.6 (H) 05/21/2018 06:23 AM      BNP No results found for: BNP, BNPP, XBNPT   Liver Enzymes No results for input(s): TP, ALB, TBIL, AP in the last 72 hours.     No lab exists for component: SGOT, GPT, DBIL   Thyroid Studies Lab Results   Component Value Date/Time    TSH 0.21 (L) 03/19/2021 04:00 AM          Signed By: Kaleigh Diane MD     July 10, 2022

## 2022-07-11 VITALS
HEART RATE: 73 BPM | SYSTOLIC BLOOD PRESSURE: 146 MMHG | RESPIRATION RATE: 20 BRPM | WEIGHT: 229.6 LBS | OXYGEN SATURATION: 96 % | HEIGHT: 64 IN | DIASTOLIC BLOOD PRESSURE: 78 MMHG | BODY MASS INDEX: 39.2 KG/M2 | TEMPERATURE: 98.2 F

## 2022-07-11 LAB
ANION GAP SERPL CALC-SCNC: 5 MMOL/L (ref 3–18)
BUN SERPL-MCNC: 38 MG/DL (ref 7–18)
BUN/CREAT SERPL: 25 (ref 12–20)
CALCIUM SERPL-MCNC: 8.8 MG/DL (ref 8.5–10.1)
CHLORIDE SERPL-SCNC: 111 MMOL/L (ref 100–111)
CO2 SERPL-SCNC: 25 MMOL/L (ref 21–32)
CREAT SERPL-MCNC: 1.52 MG/DL (ref 0.6–1.3)
GLUCOSE BLD STRIP.AUTO-MCNC: 124 MG/DL (ref 70–110)
GLUCOSE BLD STRIP.AUTO-MCNC: 128 MG/DL (ref 70–110)
GLUCOSE SERPL-MCNC: 110 MG/DL (ref 74–99)
POTASSIUM SERPL-SCNC: 5.6 MMOL/L (ref 3.5–5.5)
SODIUM SERPL-SCNC: 141 MMOL/L (ref 136–145)

## 2022-07-11 PROCEDURE — 94761 N-INVAS EAR/PLS OXIMETRY MLT: CPT

## 2022-07-11 PROCEDURE — 74011000250 HC RX REV CODE- 250: Performed by: NURSE PRACTITIONER

## 2022-07-11 PROCEDURE — 74011000250 HC RX REV CODE- 250: Performed by: FAMILY MEDICINE

## 2022-07-11 PROCEDURE — 80048 BASIC METABOLIC PNL TOTAL CA: CPT

## 2022-07-11 PROCEDURE — 74011250637 HC RX REV CODE- 250/637: Performed by: INTERNAL MEDICINE

## 2022-07-11 PROCEDURE — 36415 COLL VENOUS BLD VENIPUNCTURE: CPT

## 2022-07-11 PROCEDURE — 74011250637 HC RX REV CODE- 250/637: Performed by: NURSE PRACTITIONER

## 2022-07-11 PROCEDURE — 94640 AIRWAY INHALATION TREATMENT: CPT

## 2022-07-11 PROCEDURE — 99232 SBSQ HOSP IP/OBS MODERATE 35: CPT | Performed by: INTERNAL MEDICINE

## 2022-07-11 PROCEDURE — 74011250637 HC RX REV CODE- 250/637: Performed by: FAMILY MEDICINE

## 2022-07-11 PROCEDURE — 82962 GLUCOSE BLOOD TEST: CPT

## 2022-07-11 PROCEDURE — 99238 HOSP IP/OBS DSCHRG MGMT 30/<: CPT | Performed by: FAMILY MEDICINE

## 2022-07-11 RX ORDER — FUROSEMIDE 20 MG/1
20 TABLET ORAL 2 TIMES DAILY
Status: DISCONTINUED | OUTPATIENT
Start: 2022-07-11 | End: 2022-07-11 | Stop reason: HOSPADM

## 2022-07-11 RX ORDER — HYDROCODONE BITARTRATE AND ACETAMINOPHEN 5; 325 MG/1; MG/1
1 TABLET ORAL
Qty: 12 TABLET | Refills: 0 | Status: SHIPPED | OUTPATIENT
Start: 2022-07-11 | End: 2022-07-14

## 2022-07-11 RX ORDER — FUROSEMIDE 20 MG/1
20 TABLET ORAL 2 TIMES DAILY
Qty: 60 TABLET | Refills: 0 | Status: ON HOLD | OUTPATIENT
Start: 2022-07-11 | End: 2022-08-11 | Stop reason: SDUPTHER

## 2022-07-11 RX ORDER — GUAIFENESIN/DEXTROMETHORPHAN 100-10MG/5
10 SYRUP ORAL
Qty: 236 ML | Refills: 0 | Status: SHIPPED | OUTPATIENT
Start: 2022-07-11 | End: 2022-07-21

## 2022-07-11 RX ADMIN — GUAIFENESIN AND DEXTROMETHORPHAN 10 ML: 100; 10 SYRUP ORAL at 03:41

## 2022-07-11 RX ADMIN — SODIUM CHLORIDE, PRESERVATIVE FREE 10 ML: 5 INJECTION INTRAVENOUS at 06:22

## 2022-07-11 RX ADMIN — IPRATROPIUM BROMIDE AND ALBUTEROL SULFATE 3 ML: .5; 2.5 SOLUTION RESPIRATORY (INHALATION) at 10:12

## 2022-07-11 RX ADMIN — IPRATROPIUM BROMIDE AND ALBUTEROL SULFATE 3 ML: .5; 2.5 SOLUTION RESPIRATORY (INHALATION) at 00:07

## 2022-07-11 RX ADMIN — HYDROCODONE BITARTRATE AND ACETAMINOPHEN 1 TABLET: 5; 325 TABLET ORAL at 09:11

## 2022-07-11 RX ADMIN — ATORVASTATIN CALCIUM 80 MG: 40 TABLET, FILM COATED ORAL at 08:29

## 2022-07-11 RX ADMIN — FUROSEMIDE 20 MG: 20 TABLET ORAL at 09:11

## 2022-07-11 RX ADMIN — ARFORMOTEROL TARTRATE 15 MCG: 15 SOLUTION RESPIRATORY (INHALATION) at 10:12

## 2022-07-11 RX ADMIN — EZETIMIBE 10 MG: 10 TABLET ORAL at 08:29

## 2022-07-11 RX ADMIN — ASPIRIN 81 MG CHEWABLE TABLET 81 MG: 81 TABLET CHEWABLE at 08:29

## 2022-07-11 RX ADMIN — CARVEDILOL 25 MG: 25 TABLET, FILM COATED ORAL at 08:29

## 2022-07-11 NOTE — PROGRESS NOTES
Progress  Note    74-year-old female with past medical history of diabetes, hypertension, congestive heart failure, CKD admitted for short of breath, following for renal failure  Subjective      Overnight event noted  Cough  Mild sob  But overall better      IMPRESSION:   Acute on chronic kidney injury, suspect cardiorenal with presentation, other diferrential would be viral pneumonitis ,  CTA negative for PE  CKD stage IV with proteinuria Baseline creatinine around 1.3 to 1.5 mg per DL  Heart failure with preserved ejection fraction, no pulmonary hypertension  Lower extremity edema  Diabetes  Secondary hyperparathyroidism CKD   PLAN:   Resume diuresis. Joanie Inman for discharge with OP follow up   Following closely.       Facility-Administered Medications: None       Current Facility-Administered Medications   Medication Dose Route Frequency    furosemide (LASIX) tablet 20 mg  20 mg Oral BID    guaiFENesin-dextromethorphan (ROBITUSSIN DM) 100-10 mg/5 mL syrup 10 mL  10 mL Oral Q6H PRN    HYDROcodone-acetaminophen (NORCO) 5-325 mg per tablet 1-2 Tablet  1-2 Tablet Oral Q6H PRN    menthol 5 MG 1 Lozenge  1 Lozenge Mucous Membrane PRN    sodium chloride (NS) flush 5-40 mL  5-40 mL IntraVENous Q8H    sodium chloride (NS) flush 5-40 mL  5-40 mL IntraVENous PRN    acetaminophen (TYLENOL) tablet 650 mg  650 mg Oral Q6H PRN    Or    acetaminophen (TYLENOL) suppository 650 mg  650 mg Rectal Q6H PRN    polyethylene glycol (MIRALAX) packet 17 g  17 g Oral DAILY PRN    ondansetron (ZOFRAN ODT) tablet 4 mg  4 mg Oral Q8H PRN    Or    ondansetron (ZOFRAN) injection 4 mg  4 mg IntraVENous Q6H PRN    heparin (porcine) injection 5,000 Units  5,000 Units SubCUTAneous Q8H    insulin lispro (HUMALOG) injection   SubCUTAneous AC&HS    glucose chewable tablet 16 g  4 Tablet Oral PRN    glucagon (GLUCAGEN) injection 1 mg  1 mg IntraMUSCular PRN    dextrose 10% infusion 0-250 mL  0-250 mL IntraVENous PRN    atorvastatin (LIPITOR) tablet 80 mg  80 mg Oral DAILY    carvediloL (COREG) tablet 25 mg  25 mg Oral BID WITH MEALS    ezetimibe (ZETIA) tablet 10 mg  10 mg Oral DAILY    aspirin chewable tablet 81 mg  81 mg Oral DAILY    arformoteroL (BROVANA) neb solution 15 mcg  15 mcg Nebulization BID RT    albuterol-ipratropium (DUO-NEB) 2.5 MG-0.5 MG/3 ML  3 mL Nebulization Q4H RT       Review of Systems:     As above   Data Review:    Labs: Results:       Chemistry Recent Labs     07/11/22  0219 07/10/22  0509 07/09/22  1132   * 124* 196*    142 139   K 5.6* 5.4 5.5    113* 110   CO2 25 25 22   BUN 38* 46* 33*   CREA 1.52* 2.09* 2.12*   CA 8.8 8.7 9.1   AGAP 5 4 7   BUCR 25* 22* 16      CBC w/Diff No results for input(s): WBC, RBC, HGB, HCT, PLT, GRANS, LYMPH, EOS, HGBEXT, HCTEXT, PLTEXT, HGBEXT, HCTEXT, PLTEXT in the last 72 hours. Coagulation No results for input(s): PTP, INR, APTT, INREXT, INREXT in the last 72 hours. Iron/Ferritin No results for input(s): IRON in the last 72 hours. No lab exists for component: TIBCCALC   BNP No results for input(s): BNPP in the last 72 hours. Cardiac Enzymes No results for input(s): CPK, CKND1, GODFREY in the last 72 hours. No lab exists for component: CKRMB, TROIP   Liver Enzymes No results for input(s): TP, ALB, TBIL, AP in the last 72 hours.     No lab exists for component: SGOT, GPT, DBIL   Thyroid Studies Lab Results   Component Value Date/Time    TSH 0.21 (L) 03/19/2021 04:00 AM         EKG: Sinus    Physical Assessment:     Visit Vitals  BP (!) 146/78   Pulse 73   Temp 98.2 °F (36.8 °C)   Resp 20   Ht 5' 4\" (1.626 m)   Wt 104.1 kg (229 lb 9.6 oz)   SpO2 96%   BMI 39.41 kg/m²     Weight change:     Intake/Output Summary (Last 24 hours) at 7/11/2022 1243  Last data filed at 7/10/2022 1325  Gross per 24 hour   Intake 240 ml   Output --   Net 240 ml     Physical Exam:   General: comfortable, no acute distress   HEENT sclera anicteric, supple neck, no thyromegaly  CVS: S1S2 heard,  no rub  RS: + air entry b/l,   Abd: Soft, Non tender, Not distended, Positive bowel sounds, no organomegaly, no CVA / supra pubic tenderness  Neuro: non focal, awake, alert , CN II-XII are grossly intact  Extrm: +edema, no cyanosis, clubbing   Skin: no visible  Rash  Musculoskeletal: No gross joints or bone deformities     Procedures/imaging: see electronic medical records for all procedures, Xrays and details which were not copied into this note but were reviewed prior to creation of Dre Wilkinson MD  July 11, 2022  [facility]  Office [phone]

## 2022-07-11 NOTE — DISCHARGE INSTRUCTIONS
DISCHARGE SUMMARY from Nurse    PATIENT INSTRUCTIONS:    After general anesthesia or intravenous sedation, for 24 hours or while taking prescription Narcotics:  · Limit your activities  · Do not drive and operate hazardous machinery  · Do not make important personal or business decisions  · Do  not drink alcoholic beverages  · If you have not urinated within 8 hours after discharge, please contact your surgeon on call. Report the following to your surgeon:  · Excessive pain, swelling, redness or odor of or around the surgical area  · Temperature over 100.5  · Nausea and vomiting lasting longer than 4 hours or if unable to take medications  · Any signs of decreased circulation or nerve impairment to extremity: change in color, persistent  numbness, tingling, coldness or increase pain  · Any questions    What to do at Home:  Recommended activity: Activity as tolerated    If you experience any of the following symptoms shortness of breath, suddenly gain of greater than 2 pounds overnight, chest pressure/pain that does not resolve with rest , please follow up with your primary care physician. If unable to reach them, please proceed to the nearest emergency room. *  Please give a list of your current medications to your Primary Care Provider. *  Please update this list whenever your medications are discontinued, doses are      changed, or new medications (including over-the-counter products) are added. *  Please carry medication information at all times in case of emergency situations. These are general instructions for a healthy lifestyle:    No smoking/ No tobacco products/ Avoid exposure to second hand smoke  Surgeon General's Warning:  Quitting smoking now greatly reduces serious risk to your health.     Obesity, smoking, and sedentary lifestyle greatly increases your risk for illness    A healthy diet, regular physical exercise & weight monitoring are important for maintaining a healthy lifestyle    You may be retaining fluid if you have a history of heart failure or if you experience any of the following symptoms:  Weight gain of 3 pounds or more overnight or 5 pounds in a week, increased swelling in our hands or feet or shortness of breath while lying flat in bed. Please call your doctor as soon as you notice any of these symptoms; do not wait until your next office visit. The discharge information has been reviewed with the patient. The patient verbalized understanding. Discharge medications reviewed with the patient and appropriate educational materials and side effects teaching were provided.   ___________________________________________________________________________________________________________________________________

## 2022-07-11 NOTE — PROGRESS NOTES
[facility]  -  Progress Note      Patient: Nelida Nichols MRN: [id number]  SSN: [id number]    YOB: 1950  Age: 81 y.o. Sex: female      Admit Date: 7/8/2022    Assessment:     Hospital Problems  Date Reviewed: 4/26/2021          Codes Class Noted POA    Pulmonary hypertension ([facility] ICD-10-CM: I27.20  ICD-9-CM: 416.8  7/8/2022 Unknown        Acute exacerbation of CHF (congestive heart failure) ([facility] ICD-10-CM: I50.9  ICD-9-CM: 428.0  7/8/2022 Unknown        Hyperkalemia ICD-10-CM: E87.5  ICD-9-CM: 276.7  3/7/2022 Unknown            1.  Shortness of breath multifactorial possible CHF bu history of t clinically appears more to be pneumonitis her CTA and chest x-ray are more suggestive of that.  And concern about viral pneumonitis in this patient. 2.  Cardiomyopathy with mildly reduced systolic function  3.  Acute on chronic chronic systolic CHF with mildly reduced systolic function.  Patient had edema on admission which has improved.  I do not see any clinical sign of fluid overload at present.  Agree with holding diuresis particularly she is getting contrast  4.  Recent admission with hyperkalemia.  Currently renal on board  5.  CAD with prior coronary stent and clinically appears stable.  No clinical sign of ACS.  Troponin is negative  6.  Elevated NT BNP pro likely combination of renal insufficiency and CHF  7.  Acute on chronic renal insufficiency admission creatinine and GFR are worse compared to discharge.  Continue monitoring and follow-up with renal.   8. no evidence of pulmonary hypertension reported on recent echo. Plan:     Patient feels near baseline. She is scheduled to be discharged to home today. She will follow-up with her primary cardiologist, Dr. Laura Singletary within the next 4 weeks. Subjective:     No new complaints.      Objective:      Patient Vitals for the past 8 hrs:   Temp Pulse Resp BP SpO2   07/11/22 1222 98.2 °F (36.8 °C) 73 20 (!) 146/78 96 %   07/11/22 1014 -- -- -- -- 95 %   07/11/22 0748 98.4 °F (36.9 °C) 82 18 (!) 170/77 94 %         Patient Vitals for the past 96 hrs:   Weight   07/10/22 1323 104.1 kg (229 lb 9.6 oz)       No intake or output data in the 24 hours ending 07/11/22 1406    Physical Exam:  General:  alert, cooperative, no distress, appears stated age  Neck:  no JVD  Lungs:  clear to auscultation bilaterally  Heart:  regular rate and rhythm  Abdomen:  no guarding or rigidity  Extremities:  no edema    Data Review:     Labs: Results:       Chemistry Recent Labs     07/11/22  0219 07/10/22  0509 07/09/22  1132   * 124* 196*    142 139   K 5.6* 5.4 5.5    113* 110   CO2 25 25 22   BUN 38* 46* 33*   CREA 1.52* 2.09* 2.12*   CA 8.8 8.7 9.1   AGAP 5 4 7   BUCR 25* 22* 16      CBC w/Diff No results for input(s): WBC, RBC, HGB, HCT, PLT, GRANS, LYMPH, EOS, HGBEXT, HCTEXT, PLTEXT in the last 72 hours. Cardiac Enzymes No results found for: CPK, CK, CKMMB, CKMB, RCK3, CKMBT, CKNDX, CKND1, GODFREY, TROPT, TROIQ, SANG, TROPT, TNIPOC, BNP, BNPP   Coagulation No results for input(s): PTP, INR, APTT, INREXT in the last 72 hours. Lipid Panel Lab Results   Component Value Date/Time    Cholesterol, total 152 10/10/2019 12:00 AM    HDL Cholesterol 46 10/10/2019 12:00 AM    LDL, calculated 86 10/10/2019 12:00 AM    VLDL, calculated 20 10/10/2019 12:00 AM    Triglyceride 101 10/10/2019 12:00 AM    CHOL/HDL Ratio 5.6 (H) 05/21/2018 06:23 AM      BNP No results found for: BNP, BNPP, XBNPT   Liver Enzymes No results for input(s): TP, ALB, TBIL, AP in the last 72 hours.     No lab exists for component: SGOT, GPT, DBIL   Digoxin    Thyroid Studies Lab Results   Component Value Date/Time    TSH 0.21 (L) 03/19/2021 04:00 AM

## 2022-07-11 NOTE — ROUTINE PROCESS
Wound Prevention Checklist    Patient: Agata Vargas (58 y.o. female)  Date: 7/11/2022  Diagnosis: Pulmonary hypertension (HCC) [I27.20]  Hyperkalemia [E87.5]  Acute exacerbation of CHF (congestive heart failure) ([facility] [I50.9] <principal problem not specified>    Precautions:         []  Heel prevention boots placed on patient    []  Patient turned q2h during shift    []  Lift team ordered    []  Patient on Gerry bed/Specialty bed    []  Each Wound is documented during shift (Stage, Color, drainage, odor, measurements, and dressings)    [x]  Dual skin checks done at bedside during shift report with Rhea Hubbard RN     Bedside and Verbal shift change report given to Bree Mckeon (oncoming nurse) by Rena Ahn RN   (offgoing nurse). Report included the following information SBAR, Kardex, Intake/Output, MAR and Recent Results.

## 2022-07-11 NOTE — PROGRESS NOTES
Comprehensive Nutrition Assessment    Type and Reason for Visit: Initial,Positive nutrition screen    Nutrition Recommendations/Plan:   1. Add low potassium restriction to diet order. Pt educated on limiting potassium until hypokalemia resolves. Malnutrition Assessment:  Malnutrition Status:  No malnutrition (07/11/22 1242)      Nutrition History and Allergies:   Pertinent PMH: DM, COPD, dyslipidemia, gout, heart attack, hypercholesterolemia, HTN, vitamin D deficiency, CKD. Presented with SOB. Recent admission for hyperkalemia and CARA. NKFA. Denies weight change outside of fluctuations within UBW range of 220-230 lbs. Reports good appetite and meal intake PTA. Nutrition Assessment:    Admitted with pulmonary HTN, hyperkalemia, CARA and CHF exacerbation. PT endorses good appetite and meal intake. Discussed limiting potassium intake 2/2 elevated potassium and CKD. Provided educational handout and reviewed, pt receptive. Noted possible discharge today. Nutrition Related Findings:    Last BM 7/8, + edema. Pertinent Meds: SSI, lasix. K 5.6, elevated creatinine, above baseline. Wound Type: None    Current Nutrition Intake & Therapies:  Average Meal Intake: %  Average Supplement Intake: None ordered  ADULT DIET Regular; 4 carb choices (60 gm/meal)    Anthropometric Measures:  Height: 5' 4\" (162.6 cm)  Ideal Body Weight (IBW): 120 lbs (55 kg)  Admission Body Weight: 229 lb 4.5 oz  Current Body Wt:  104 kg (229 lb 4.5 oz), 191.1 % IBW. Bed scale  Current BMI (kg/m2): 39.3  Usual Body Weight: 102.1 kg (225 lb)  % Weight Change (Calculated): 1.9  Weight Adjustment: No adjustment                 BMI Category: Obese class 2 (BMI 35.0-39. 9)    Estimated Daily Nutrient Needs:  Energy Requirements Based On: Kcal/kg (30-35)  Weight Used for Energy Requirements: Other (specify) (SBW 66 kg)  Energy (kcal/day): 6983-1739  Weight Used for Protein Requirements: Current  Protein (g/day): 62-83 (0.6-0.8 g/kg)  Method Used for Fluid Requirements: 1 ml/kcal  Fluid (ml/day): 1980-2310    Nutrition Diagnosis:   · Altered nutrition-related lab values related to renal dysfunction as evidenced by other (specify) (hyperkalemia with CARA on CKD)      Nutrition Interventions:   Food and/or Nutrient Delivery: Modify current diet  Nutrition Education/Counseling: Education completed  Coordination of Nutrition Care: Continue to monitor while inpatient  Plan of Care discussed with: patient     Nutrition Education:  · Educated on low potassium diet  · Learners: Patient  · Readiness: Acceptance  · Method: Explanation and Handout  · Response: Verbalizes Understanding and Demonstrated Understanding  · Contact name and number provided.     Goals:     Goals: Meet at least 75% of estimated needs,by next RD assessment       Nutrition Monitoring and Evaluation:   Behavioral-Environmental Outcomes: None identified  Food/Nutrient Intake Outcomes: Food and nutrient intake  Physical Signs/Symptoms Outcomes: Biochemical data,Meal time behavior    Discharge Planning:    Continue current diet    William Niño, [address]  Contact: [phone]

## 2022-07-11 NOTE — PROGRESS NOTES
conducted an initial consultation and Spiritual Assessment for Adán Jackson, who is a 81 y.o.,female. Patients Primary Language is: [place]. According to the patients EMR Jain Affiliation is: No preference. The reason the Patient came to the hospital is:   Patient Active Problem List    Diagnosis Date Noted    Pulmonary hypertension ([address] 07/08/2022    Acute exacerbation of CHF (congestive heart failure) ([address] 07/08/2022    Chronic respiratory failure with hypoxia ([address] 06/27/2022    History of myocardial infarction 06/27/2022    Acute hyperkalemia 06/26/2022    Breast cancer ([address] 06/26/2022    Rectal cancer ([address] 06/26/2022    Hyperkalemia 03/07/2022    Stage 1 acute kidney injury ([address] 12/18/2021    Hypotension 03/19/2021    Sepsis ([address] 03/18/2021    CARA (acute kidney injury) ([address] 03/18/2021    Nonrheumatic mitral valve regurgitation 08/13/2020    Chronic systolic congestive heart failure ([address] 11/07/2019    Pulmonary edema cardiac cause ([address] 10/19/2019    COPD with acute exacerbation ([address] 10/19/2019    Respiratory failure requiring intubation ([address] 10/19/2019    Syncope 10/18/2019    Flash pulmonary edema ([address] 10/18/2019    Elevated d-dimer 10/18/2019    Acute on chronic respiratory failure with hypoxia ([address] 10/18/2019    CAD (coronary artery disease) 10/18/2019    Ischemic cardiomyopathy 09/09/2019    CAP (community acquired pneumonia) 08/26/2019    Combined systolic and diastolic congestive heart failure ([address] 08/26/2019    Type 2 diabetes mellitus with hyperglycemia ([address] 08/26/2019    TESSA on CPAP 08/26/2019    COPD exacerbation ([address] 08/25/2019    Type 2 diabetes with nephropathy ([address] 07/12/2018    Severe obesity (BMI 35.0-39. 9) with comorbidity ([address] 07/12/2018    Chest pain 06/09/2018    SOB (shortness of breath) 06/09/2018    Coronary artery disease involving native coronary artery with unstable angina pectoris ([facility] 06/09/2018    Pulmonary edema 06/08/2018    Coronary artery disease involving native coronary artery of native heart without angina pectoris 05/31/2018    STEMI (ST elevation myocardial infarction) ([facility] 05/20/2018    Acute pulmonary edema (HCC) 05/20/2018    Caregiver stress 04/26/2017    Bilateral shoulder pain 09/27/2016    Elevated alkaline phosphatase level 09/27/2016    Chronic obstructive pulmonary disease ([facility] 05/17/2016    Compliance with medication regimen 05/17/2016    Chronic pain of both knees 04/13/2016    Chronic pain syndrome 04/13/2016    Primary osteoarthritis of both knees 04/13/2016    Iron deficiency anemia 10/22/2015    Decreased GFR 10/22/2015    Dyslipidemia, goal LDL below 70 10/22/2015    Former smoker 10/22/2015    Osteoarthritis of both knees     Essential hypertension 09/17/2015    COPD (chronic obstructive pulmonary disease) ([facility] 09/01/2015    Diabetes mellitus type 2, insulin dependent ([facility] 07/02/2015    Environmental allergies 04/01/2015    Cardiomyopathy, dilated ([facility] 12/31/2014        The  provided the following Interventions:  Initiated a relationship of care and support with patient just prior to her being discharged  Listened empathically as she shared with me her yasir of being able to go home today. There is no advance directive present. Provided information about Spiritual Care Services. Offered prayer and assurance of continued prayers on patients behalf. Chart reviewed. The following outcomes were achieved:  Patient shared limited information about both their medical narrative and spiritual journey/beliefs. Patient processed feeling about current hospitalization. Patient expressed gratitude for pastoral care visit. Assessment:  Patient does not have any Cheondoism/cultural needs that will affect patients preferences in health care. There are no further spiritual or Cheondoism issues which require Spiritual Care Services interventions at this time. Plan:  Chaplains will continue to follow and will provide pastoral care on an as needed/requested basis    . Sunny Romano   Spiritual Care   [phone]

## 2022-07-11 NOTE — PROGRESS NOTES
..Reason for Admission:   Pulmonary hypertension ([facility] [address] [I27.20]  Hyperkalemia [E87.5]  Acute exacerbation of CHF (congestive heart failure) ([facility] [address] [address] [I50.9]               RUR Score:     21%             Resources/supports as identified by patient/family:       Top Challenges facing patient (as identified by patient/family and CM): No Concerns    Finances/Medication cost?     No Concerns  Transportation      No Concerns  Support system or lack thereof? No Concerns  Living arrangements? No Concerns  Self-care/ADLs/Cognition? No Concerns        Current Advanced Directive/Advance Care Plan:   no    Healthcare Decision Maker:   Primary Decision Maker: Jefe Yin - Spouse - [phone]    Click here to complete [address] including selection of the Healthcare Decision Maker Relationship (ie \"Primary\")                          Plan for utilizing home health:    no                      Likelihood of readmission:   HIGH    Transition of Care Plan:                    Initial assessment completed with patient. Cognitive status of patient: oriented to time, place, person and situation. Face sheet information confirmed:  yes. The patient designates spouse, Dede Plan, to participate in her discharge plan and to receive any needed information. This patient lives in a single family home, one level with four steps to enter. Patient resides with her spouse   Patient is able to navigate steps as needed. Prior to hospitalization, patient was considered to be independent with ADLs/IADLS : yes . Patient has a current ACP document on file: no  The patient and spouse will be available to transport patient home upon discharge. The patient already has no medical equipment available in the home. Patient does not utilize any DME's. Patient is not currently active with home health. Patient has not stayed in a skilled nursing facility or rehab. Was  stay within last 60 days : no.       This patient is on dialysis :no         Currently, the discharge plan is Home. The patient states that she can obtain her medications from the pharmacy, and take her medications as directed. Patient's current insurance is [employer] and [employer]. Care Management Interventions  PCP Verified by CM:  Yes  Mode of Transport at Discharge:  (pt's spouse will transport at [address])  Transition of Care Consult (CM Consult): Discharge Planning  Support Systems: Spouse/Significant Other  Confirm Follow Up Transport: Family (pt's spouse will provide transport at dc)  Discharge Location  Patient Expects to be Discharged to[de-identified] [facility]  Care Management

## 2022-07-11 NOTE — PROGRESS NOTES
Patient provided with copy of AVS. Allowed time for questions regarding medication. Patient expressed understanding. IV cathter removed. Armband removed. Patient expressed gratitude for nurse's help. Awaiting ride home. No complaints of s/s of distress at this time.

## 2022-07-11 NOTE — PROGRESS NOTES
D/C order noted for today. Orders reviewed. No needs identified at this time. Pt's spouse will transport home. CM remains available if needed.      KAMLESH Cisneros

## 2022-07-31 NOTE — DISCHARGE SUMMARY
Discharge Summary    Patient: Carmen Bautista MRN: [id number]  CSN: [id number]    YOB: 1950  Age: 81 y.o. Sex: female    DOA: 7/8/2022 LOS:  LOS: 3 days   Discharge Date: 7/11/2022     Admission Diagnoses: Pulmonary hypertension ([facility] [I27.20]  Hyperkalemia [E87.5]  Acute exacerbation of CHF (congestive heart failure) ([facility] [I50.9]    Discharge Diagnoses:    Shortness of breath, suspect primarily secondary to viral pneumonitis (based on appearance of lungs  on CT imaging) as well as mild systolic CHF  Mild acute on chronic systolic CHF  Multiple new Ill-defined airspace nodules, concern for inflammatory vs infectious etiology  DM2  CARA on CKD 4  Chronic anemia  Hyperkalemia  Severe obesity, BMI 36.73    Discharge Condition: Stable    PHYSICAL EXAM  Visit Vitals  BP (!) 146/78   Pulse 73   Temp 98.2 °F (36.8 °C)   Resp 20   Ht 5' 4\" (1.626 m)   Wt 104.1 kg (229 lb 9.6 oz)   SpO2 96%   BMI 39.41 kg/m²       General: In NAD. Nontoxic-appearing. HEENT: NCAT. Sclerae anicteric. Lungs:  Clear, no wheezes. No accessory muscle use. Heart:  RRR. Abdomen: Soft, NT/ND. Extremities: Warm, no edema or ischemia. Psych:   Mood normal.  Neurologic:  Awake and alert, moves extremities spontaneously. Motor grossly nonfocal.    Hospital Course:   See admission H&P for full details of HPI. Patient was admitted to the hospital after presenting to the emergency department for evaluation of shortness of breath. CTA done in the ED was negative for PE but was concerning for acute pneumonitis. Patient did not appear to be significantly volume overloaded although cardiology evaluation was obtained. Respiratory status remained stable and patient without hypoxia and did not require any ongoing supplemental oxygen. Nephrology evaluation remains obtained for assistance with management of CARA on CKD and hyperkalemia. Overall condition has continued to remain acceptable, especially with regard.   Pulmonary status. There has been no evidence for any active/acute bacterial infection and patient has remained afebrile. She is felt to have met maximum benefit of hospitalization and is medically stable for discharge home with outpatient follow-up as advised. Consults:   Cardiology  Nephrology    Significant Diagnostic Studies/Procedures:   IMPRESSION  1. Findings concerning for interstitial pneumonitis. Continued follow-up is  recommended. IMPRESSION  1. No convincing CT evidence of pulmonary embolism. 2.  Mild pulmonary arterial dilatation is suggestive pulmonary arterial  hypertension. 3. Moderate progression of intralobular septal thickening and reticulonodular  opacities since the prior CT which could represent interstitial pneumonitis  and/or edema. 4. Several new ill-defined airspace nodules also developed in bilateral lungs,  likely portion of the inflammatory/infectious process. Short-term follow-up CT  after treatment advised for interval resolution. 5. Mild progression of mediastinal and hilar adenopathy. 6. New small bilateral pleural effusions. Discharge Medications:     Discharge Medication List as of 7/11/2022  1:52 PM        START taking these medications    Details   guaiFENesin-dextromethorphan (ROBITUSSIN DM) 100-10 mg/5 mL syrup Take 10 mL by mouth every six (6) hours as needed for Cough or Congestion for up to 10 days. , Normal, Disp-236 mL, R-0      HYDROcodone-acetaminophen (NORCO) 5-325 mg per tablet Take 1 Tablet by mouth every six (6) hours as needed for Pain for up to 3 days. Max Daily Amount: 4 Tablets., Normal, Disp-12 Tablet, R-0      furosemide (LASIX) 20 mg tablet Take 1 Tablet by mouth two (2) times a day., Normal, Disp-60 Tablet, R-0           CONTINUE these medications which have NOT CHANGED    Details   ezetimibe (ZETIA) 10 mg tablet Take 10 mg by mouth daily. , Historical Med      albuterol-ipratropium (DUO-NEB) 2.5 mg-0.5 mg/3 ml nebu 3 mL by Nebulization route every six (6) hours as needed for Wheezing., Normal, Disp-30 Nebule, R-0      atorvastatin (LIPITOR) 80 mg tablet Take 1 Tab by mouth daily. , Normal, Disp-90 Tab, R-3      carvediloL (COREG) 25 mg tablet Take 1 Tab by mouth two (2) times daily (with meals). , Normal, Disp-180 Tab, R-3      aspirin 81 mg chewable tablet Take 2 Tabs by mouth daily. , Normal, Disp-60 Tab, R-0      hydrALAZINE (APRESOLINE) 100 mg tablet TAKE 1 TABLET BY MOUTH 2 TIMES A DAY WITH FOOD, Historical Med      gabapentin (NEURONTIN) 400 mg capsule Take 400 mg by mouth daily. , Historical Med      tiZANidine (ZANAFLEX) 4 mg tablet Take 4 mg by mouth two (2) times daily as needed for Muscle Spasm(s). , Historical Med      calcitRIOL (ROCALTROL) 0.25 mcg capsule Take 1 Cap by mouth daily. , Normal, Disp-30 Cap, R-0      insulin detemir (LEVEMIR) 100 unit/mL injection 15 units daily for diabetes, Normal, Disp-2 Vial, R-0      desloratadine (CLARINEX) 5 mg tablet Take 1 Tab by mouth daily as needed for Allergies. , Program, Disp-30 Tab, R-0      mometasone (NASONEX) 50 mcg/actuation nasal spray 2 Sprays by Both Nostrils route daily as needed. For allergies, Program, Disp-3 Container, R-3      umeclidinium-vilanterol (ANORO ELLIPTA) 62.5-25 mcg/actuation inhaler Take 1 Puff by inhalation daily. For COPD, Program9/22/15 Dr Keke Wise prescribed. Will order via [facility] if availableDisp-3 Inhaler, R-3           STOP taking these medications       Combivent Respimat  mcg/actuation inhaler Comments:   Reason for Stopping:                 Activity: As tolerated. Diet: Cardiac/diabetic. Disposition: Home. Follow-up: with PCP in 1 week, nephrology and cardiology as directed. Yinka Wiley MD  [address] [place], [place] [address]ists    Disclaimer: Sections of this note are dictated using utilizing voice recognition software. Minor typographical errors may be present.  If questions arise, please do not hesitate to contact me or call our department.

## 2022-07-31 NOTE — PROGRESS NOTES
Physician Progress Note      Priscilla Dexter  [facility] #:                  [id number]  :                       1950  ADMIT DATE:       2022 9:57 AM  DISCH DATE:        2022 3:15 PM  RESPONDING  PROVIDER #:        Ginger Tao MD          QUERY TEXT:    Patient admitted with sob for 3 days diagnosed with acute on chronic systolic HF. Noted documentation of possible pneumonitis in PN on 7/10/22 . In order to support the diagnosis of pneumonitis, please include additional clinical indicators in your documentation. Or please document if the diagnosis of pneumonitis has been ruled out after further study. The medical record reflects the following:  Risk Factors: 81 y.o. female who presents with SOB, chest pain, and leg swelling. SOB worsening over the last 3 days. On exam expiratory wheezing. Clinical Indicators:  labs: wbc 8.0 no other further labs ordered. VS: temp 99.3, HR 95, RR 10, /112,  temp 98.7 , RR 20, /90  PMD PN 7/10: Shortness of breath, possible pneumonitis vs CHF, Cardiology consult  Shortness of breath multifactorial possible CHF but clinically appears more to be pneumonitis her CTA and chest x-ray are more suggestive of that. And concern about viral pneumonitis in this patient. Treatment: none    Thank you,  Zhao LOZANO, RN  Clinical   email: [email or web address]  .  Options provided:  -- Pneumonitis present as evidenced by, Please document evidence.   -- Pneumonitis was ruled out  -- Other - I will add my own diagnosis  -- Disagree - Not applicable / Not valid  -- Disagree - Clinically unable to determine / Unknown  -- Refer to Clinical Documentation Reviewer    PROVIDER RESPONSE TEXT:    See discharge summary    Query created by: Kailash Lopes on 2022 11:01 AM      Electronically signed by:  Ginger Tao MD 2022 10:52 AM

## 2022-08-08 ENCOUNTER — HOSPITAL ENCOUNTER (INPATIENT)
Age: 72
LOS: 3 days | Discharge: HOME | DRG: 177 | End: 2022-08-11
Attending: EMERGENCY MEDICINE | Admitting: INTERNAL MEDICINE
Payer: MEDICARE

## 2022-08-08 ENCOUNTER — APPOINTMENT (OUTPATIENT)
Dept: GENERAL RADIOLOGY | Age: 72
DRG: 177 | End: 2022-08-08
Attending: PHYSICIAN ASSISTANT
Payer: MEDICARE

## 2022-08-08 ENCOUNTER — APPOINTMENT (OUTPATIENT)
Dept: CT IMAGING | Age: 72
DRG: 177 | End: 2022-08-08
Attending: EMERGENCY MEDICINE
Payer: MEDICARE

## 2022-08-08 DIAGNOSIS — J96.01 ACUTE RESPIRATORY FAILURE WITH HYPOXIA (HCC): Primary | ICD-10-CM

## 2022-08-08 DIAGNOSIS — U07.1 COVID-19: ICD-10-CM

## 2022-08-08 DIAGNOSIS — R07.9 CHEST PAIN, UNSPECIFIED TYPE: ICD-10-CM

## 2022-08-08 DIAGNOSIS — Z87.09 HISTORY OF CHRONIC OBSTRUCTIVE LUNG DISEASE: ICD-10-CM

## 2022-08-08 PROBLEM — N18.32 STAGE 3B CHRONIC KIDNEY DISEASE (HCC): Status: ACTIVE | Noted: 2022-08-08

## 2022-08-08 PROBLEM — R09.02 HYPOXIA: Status: ACTIVE | Noted: 2022-08-08

## 2022-08-08 PROBLEM — J12.82 PNEUMONIA DUE TO COVID-19 VIRUS: Status: ACTIVE | Noted: 2022-08-08

## 2022-08-08 PROBLEM — I50.33 ACUTE ON CHRONIC DIASTOLIC HEART FAILURE (HCC): Status: ACTIVE | Noted: 2022-08-08

## 2022-08-08 PROBLEM — J96.11 CHRONIC RESPIRATORY FAILURE WITH HYPOXIA (HCC): Status: RESOLVED | Noted: 2022-06-27 | Resolved: 2022-08-08

## 2022-08-08 PROBLEM — J96.11 CHRONIC RESPIRATORY FAILURE WITH HYPOXIA (HCC): Chronic | Status: RESOLVED | Noted: 2022-06-27 | Resolved: 2022-08-08

## 2022-08-08 LAB
ALBUMIN SERPL-MCNC: 3.2 G/DL (ref 3.4–5)
ALBUMIN/GLOB SERPL: 1.1 {RATIO} (ref 0.8–1.7)
ALP SERPL-CCNC: 173 U/L (ref 45–117)
ALT SERPL-CCNC: 75 U/L (ref 13–56)
ANION GAP SERPL CALC-SCNC: 7 MMOL/L (ref 3–18)
ARTERIAL PATENCY WRIST A: POSITIVE
AST SERPL-CCNC: 33 U/L (ref 10–38)
ATRIAL RATE: 85 BPM
BASE DEFICIT BLD-SCNC: 4.7 MMOL/L
BASOPHILS # BLD: 0 K/UL (ref 0–0.1)
BASOPHILS NFR BLD: 0 % (ref 0–2)
BDY SITE: ABNORMAL
BILIRUB DIRECT SERPL-MCNC: 0.1 MG/DL (ref 0–0.2)
BILIRUB SERPL-MCNC: 0.4 MG/DL (ref 0.2–1)
BNP SERPL-MCNC: 1871 PG/ML (ref 0–900)
BUN SERPL-MCNC: 37 MG/DL (ref 7–18)
BUN/CREAT SERPL: 22 (ref 12–20)
CALCIUM SERPL-MCNC: 9 MG/DL (ref 8.5–10.1)
CALCULATED P AXIS, ECG09: 66 DEGREES
CALCULATED R AXIS, ECG10: 18 DEGREES
CALCULATED T AXIS, ECG11: 87 DEGREES
CHLORIDE SERPL-SCNC: 112 MMOL/L (ref 100–111)
CO2 SERPL-SCNC: 23 MMOL/L (ref 21–32)
CREAT SERPL-MCNC: 1.67 MG/DL (ref 0.6–1.3)
DIAGNOSIS, 93000: NORMAL
DIFFERENTIAL METHOD BLD: ABNORMAL
EOSINOPHIL # BLD: 0 K/UL (ref 0–0.4)
EOSINOPHIL NFR BLD: 0 % (ref 0–5)
ERYTHROCYTE [DISTWIDTH] IN BLOOD BY AUTOMATED COUNT: 19.1 % (ref 11.6–14.5)
FLUAV RNA SPEC QL NAA+PROBE: NOT DETECTED
FLUBV RNA SPEC QL NAA+PROBE: NOT DETECTED
GAS FLOW.O2 O2 DELIVERY SYS: ABNORMAL L/MIN
GAS FLOW.O2 SETTING OXYMISER: 8 BPM
GLOBULIN SER CALC-MCNC: 2.9 G/DL (ref 2–4)
GLUCOSE BLD STRIP.AUTO-MCNC: 165 MG/DL (ref 70–110)
GLUCOSE BLD STRIP.AUTO-MCNC: 202 MG/DL (ref 70–110)
GLUCOSE BLD STRIP.AUTO-MCNC: 225 MG/DL (ref 70–110)
GLUCOSE SERPL-MCNC: 136 MG/DL (ref 74–99)
HCO3 BLD-SCNC: 21.1 MMOL/L (ref 22–26)
HCT VFR BLD AUTO: 29.2 % (ref 35–45)
HGB BLD-MCNC: 8.9 G/DL (ref 12–16)
IMM GRANULOCYTES # BLD AUTO: 0.4 K/UL (ref 0–0.04)
IMM GRANULOCYTES NFR BLD AUTO: 4 % (ref 0–0.5)
INSPIRATION.DURATION SETTING TIME VENT: 14 SEC
LYMPHOCYTES # BLD: 2.3 K/UL (ref 0.9–3.6)
LYMPHOCYTES NFR BLD: 22 % (ref 21–52)
MAGNESIUM SERPL-MCNC: 2 MG/DL (ref 1.6–2.6)
MCH RBC QN AUTO: 28.1 PG (ref 24–34)
MCHC RBC AUTO-ENTMCNC: 30.5 G/DL (ref 31–37)
MCV RBC AUTO: 92.1 FL (ref 78–100)
MONOCYTES # BLD: 1 K/UL (ref 0.05–1.2)
MONOCYTES NFR BLD: 10 % (ref 3–10)
NEUTS SEG # BLD: 6.5 K/UL (ref 1.8–8)
NEUTS SEG NFR BLD: 64 % (ref 40–73)
NRBC # BLD: 0.31 K/UL (ref 0–0.01)
NRBC BLD-RTO: 3 PER 100 WBC
O2/TOTAL GAS SETTING VFR VENT: 60 %
P-R INTERVAL, ECG05: 170 MS
PCO2 BLD: 41 MMHG (ref 35–45)
PEEP RESPIRATORY: 9 CMH2O
PH BLD: 7.32 [PH] (ref 7.35–7.45)
PLATELET # BLD AUTO: 260 K/UL (ref 135–420)
PMV BLD AUTO: 9.3 FL (ref 9.2–11.8)
PO2 BLD: 80 MMHG (ref 80–100)
POTASSIUM SERPL-SCNC: 5.4 MMOL/L (ref 3.5–5.5)
PRESSURE SUPPORT SETTING VENT: 5 CMH2O
PROCALCITONIN SERPL-MCNC: <0.05 NG/ML
PROT SERPL-MCNC: 6.1 G/DL (ref 6.4–8.2)
Q-T INTERVAL, ECG07: 350 MS
QRS DURATION, ECG06: 80 MS
QTC CALCULATION (BEZET), ECG08: 416 MS
RBC # BLD AUTO: 3.17 M/UL (ref 4.2–5.3)
SAO2 % BLD: 94.6 % (ref 92–97)
SARS-COV-2, COV2: DETECTED
SERVICE CMNT-IMP: ABNORMAL
SODIUM SERPL-SCNC: 142 MMOL/L (ref 136–145)
SPECIMEN TYPE: ABNORMAL
TOTAL RESP. RATE, ITRR: 23
TROPONIN-HIGH SENSITIVITY: 27 NG/L (ref 0–54)
VENTRICULAR RATE, ECG03: 85 BPM
VT SETTING VENT: 500 ML
WBC # BLD AUTO: 10.3 K/UL (ref 4.6–13.2)

## 2022-08-08 PROCEDURE — 83735 ASSAY OF MAGNESIUM: CPT

## 2022-08-08 PROCEDURE — 94660 CPAP INITIATION&MGMT: CPT

## 2022-08-08 PROCEDURE — 71045 X-RAY EXAM CHEST 1 VIEW: CPT

## 2022-08-08 PROCEDURE — 74011250637 HC RX REV CODE- 250/637: Performed by: INTERNAL MEDICINE

## 2022-08-08 PROCEDURE — 99285 EMERGENCY DEPT VISIT HI MDM: CPT

## 2022-08-08 PROCEDURE — 82803 BLOOD GASES ANY COMBINATION: CPT

## 2022-08-08 PROCEDURE — 96375 TX/PRO/DX INJ NEW DRUG ADDON: CPT

## 2022-08-08 PROCEDURE — 94762 N-INVAS EAR/PLS OXIMTRY CONT: CPT

## 2022-08-08 PROCEDURE — 5A09357 ASSISTANCE WITH RESPIRATORY VENTILATION, LESS THAN 24 CONSECUTIVE HOURS, CONTINUOUS POSITIVE AIRWAY PRESSURE: ICD-10-PCS | Performed by: EMERGENCY MEDICINE

## 2022-08-08 PROCEDURE — 74011636637 HC RX REV CODE- 636/637: Performed by: PHYSICIAN ASSISTANT

## 2022-08-08 PROCEDURE — 74011000258 HC RX REV CODE- 258: Performed by: EMERGENCY MEDICINE

## 2022-08-08 PROCEDURE — 2709999900 HC NON-CHARGEABLE SUPPLY

## 2022-08-08 PROCEDURE — 74011000250 HC RX REV CODE- 250: Performed by: INTERNAL MEDICINE

## 2022-08-08 PROCEDURE — 82962 GLUCOSE BLOOD TEST: CPT

## 2022-08-08 PROCEDURE — 80048 BASIC METABOLIC PNL TOTAL CA: CPT

## 2022-08-08 PROCEDURE — 87636 SARSCOV2 & INF A&B AMP PRB: CPT

## 2022-08-08 PROCEDURE — 36600 WITHDRAWAL OF ARTERIAL BLOOD: CPT

## 2022-08-08 PROCEDURE — 74011250636 HC RX REV CODE- 250/636: Performed by: PHYSICIAN ASSISTANT

## 2022-08-08 PROCEDURE — 74011000250 HC RX REV CODE- 250

## 2022-08-08 PROCEDURE — 93005 ELECTROCARDIOGRAM TRACING: CPT

## 2022-08-08 PROCEDURE — 83880 ASSAY OF NATRIURETIC PEPTIDE: CPT

## 2022-08-08 PROCEDURE — 71275 CT ANGIOGRAPHY CHEST: CPT

## 2022-08-08 PROCEDURE — 74011000250 HC RX REV CODE- 250: Performed by: EMERGENCY MEDICINE

## 2022-08-08 PROCEDURE — APPSS60 APP SPLIT SHARED TIME 46-60 MINUTES: Performed by: PHYSICIAN ASSISTANT

## 2022-08-08 PROCEDURE — 74011636637 HC RX REV CODE- 636/637: Performed by: INTERNAL MEDICINE

## 2022-08-08 PROCEDURE — 87449 NOS EACH ORGANISM AG IA: CPT

## 2022-08-08 PROCEDURE — 74011000636 HC RX REV CODE- 636: Performed by: STUDENT IN AN ORGANIZED HEALTH CARE EDUCATION/TRAINING PROGRAM

## 2022-08-08 PROCEDURE — 84484 ASSAY OF TROPONIN QUANT: CPT

## 2022-08-08 PROCEDURE — 84145 PROCALCITONIN (PCT): CPT

## 2022-08-08 PROCEDURE — 80076 HEPATIC FUNCTION PANEL: CPT

## 2022-08-08 PROCEDURE — 74011250636 HC RX REV CODE- 250/636: Performed by: EMERGENCY MEDICINE

## 2022-08-08 PROCEDURE — 87040 BLOOD CULTURE FOR BACTERIA: CPT

## 2022-08-08 PROCEDURE — 74011250636 HC RX REV CODE- 250/636: Performed by: INTERNAL MEDICINE

## 2022-08-08 PROCEDURE — 96366 THER/PROPH/DIAG IV INF ADDON: CPT

## 2022-08-08 PROCEDURE — 85025 COMPLETE CBC W/AUTO DIFF WBC: CPT

## 2022-08-08 PROCEDURE — 96368 THER/DIAG CONCURRENT INF: CPT

## 2022-08-08 PROCEDURE — 96365 THER/PROPH/DIAG IV INF INIT: CPT

## 2022-08-08 PROCEDURE — 65660000004 HC RM CVT STEPDOWN

## 2022-08-08 PROCEDURE — 74011250637 HC RX REV CODE- 250/637: Performed by: PHYSICIAN ASSISTANT

## 2022-08-08 PROCEDURE — 77030038269 HC DRN EXT URIN PURWCK BARD -A

## 2022-08-08 RX ORDER — ACETAMINOPHEN 325 MG/1
650 TABLET ORAL
Status: DISCONTINUED | OUTPATIENT
Start: 2022-08-08 | End: 2022-08-11 | Stop reason: HOSPADM

## 2022-08-08 RX ORDER — IPRATROPIUM BROMIDE AND ALBUTEROL SULFATE 2.5; .5 MG/3ML; MG/3ML
3 SOLUTION RESPIRATORY (INHALATION)
Status: DISCONTINUED | OUTPATIENT
Start: 2022-08-08 | End: 2022-08-11 | Stop reason: CLARIF

## 2022-08-08 RX ORDER — GUAIFENESIN 600 MG/1
600 TABLET, EXTENDED RELEASE ORAL EVERY 12 HOURS
Status: DISCONTINUED | OUTPATIENT
Start: 2022-08-08 | End: 2022-08-11 | Stop reason: HOSPADM

## 2022-08-08 RX ORDER — DEXTROSE MONOHYDRATE 100 MG/ML
0-250 INJECTION, SOLUTION INTRAVENOUS AS NEEDED
Status: DISCONTINUED | OUTPATIENT
Start: 2022-08-08 | End: 2022-08-11 | Stop reason: HOSPADM

## 2022-08-08 RX ORDER — HYDRALAZINE HYDROCHLORIDE 50 MG/1
100 TABLET, FILM COATED ORAL 3 TIMES DAILY
Status: DISCONTINUED | OUTPATIENT
Start: 2022-08-08 | End: 2022-08-11 | Stop reason: HOSPADM

## 2022-08-08 RX ORDER — GABAPENTIN 400 MG/1
400 CAPSULE ORAL DAILY
Status: DISCONTINUED | OUTPATIENT
Start: 2022-08-09 | End: 2022-08-11 | Stop reason: HOSPADM

## 2022-08-08 RX ORDER — FUROSEMIDE 10 MG/ML
40 INJECTION INTRAMUSCULAR; INTRAVENOUS 2 TIMES DAILY
Status: DISCONTINUED | OUTPATIENT
Start: 2022-08-08 | End: 2022-08-10

## 2022-08-08 RX ORDER — VANCOMYCIN 2 GRAM/500 ML IN 0.9 % SODIUM CHLORIDE INTRAVENOUS
2000 ONCE
Status: COMPLETED | OUTPATIENT
Start: 2022-08-08 | End: 2022-08-08

## 2022-08-08 RX ORDER — MAGNESIUM SULFATE 100 %
4 CRYSTALS MISCELLANEOUS AS NEEDED
Status: DISCONTINUED | OUTPATIENT
Start: 2022-08-08 | End: 2022-08-08

## 2022-08-08 RX ORDER — ARFORMOTEROL TARTRATE 15 UG/2ML
15 SOLUTION RESPIRATORY (INHALATION)
Status: DISCONTINUED | OUTPATIENT
Start: 2022-08-08 | End: 2022-08-11 | Stop reason: CLARIF

## 2022-08-08 RX ORDER — HYDRALAZINE HYDROCHLORIDE 100 MG/1
100 TABLET, FILM COATED ORAL 3 TIMES DAILY
COMMUNITY
End: 2022-09-22

## 2022-08-08 RX ORDER — INSULIN LISPRO 100 [IU]/ML
INJECTION, SOLUTION INTRAVENOUS; SUBCUTANEOUS
Status: DISCONTINUED | OUTPATIENT
Start: 2022-08-08 | End: 2022-08-11 | Stop reason: HOSPADM

## 2022-08-08 RX ORDER — LEVOFLOXACIN 5 MG/ML
750 INJECTION, SOLUTION INTRAVENOUS EVERY 24 HOURS
Status: DISCONTINUED | OUTPATIENT
Start: 2022-08-08 | End: 2022-08-08

## 2022-08-08 RX ORDER — ONDANSETRON 2 MG/ML
4 INJECTION INTRAMUSCULAR; INTRAVENOUS
Status: DISCONTINUED | OUTPATIENT
Start: 2022-08-08 | End: 2022-08-11 | Stop reason: HOSPADM

## 2022-08-08 RX ORDER — CALCIUM CARB/MAGNESIUM CARB 311-232MG
5 TABLET ORAL
Status: DISCONTINUED | OUTPATIENT
Start: 2022-08-08 | End: 2022-08-11 | Stop reason: HOSPADM

## 2022-08-08 RX ORDER — GUAIFENESIN 100 MG/5ML
162 LIQUID (ML) ORAL DAILY
Status: DISCONTINUED | OUTPATIENT
Start: 2022-08-09 | End: 2022-08-11 | Stop reason: HOSPADM

## 2022-08-08 RX ORDER — MAGNESIUM SULFATE 100 %
4 CRYSTALS MISCELLANEOUS AS NEEDED
Status: DISCONTINUED | OUTPATIENT
Start: 2022-08-08 | End: 2022-08-11 | Stop reason: HOSPADM

## 2022-08-08 RX ORDER — FUROSEMIDE 10 MG/ML
20 INJECTION INTRAMUSCULAR; INTRAVENOUS ONCE
Status: COMPLETED | OUTPATIENT
Start: 2022-08-08 | End: 2022-08-08

## 2022-08-08 RX ORDER — BUDESONIDE 0.5 MG/2ML
500 INHALANT ORAL
Status: DISCONTINUED | OUTPATIENT
Start: 2022-08-08 | End: 2022-08-11 | Stop reason: CLARIF

## 2022-08-08 RX ORDER — INSULIN LISPRO 100 [IU]/ML
INJECTION, SOLUTION INTRAVENOUS; SUBCUTANEOUS EVERY 6 HOURS
Status: DISCONTINUED | OUTPATIENT
Start: 2022-08-08 | End: 2022-08-08

## 2022-08-08 RX ORDER — IPRATROPIUM BROMIDE AND ALBUTEROL SULFATE 2.5; .5 MG/3ML; MG/3ML
6 SOLUTION RESPIRATORY (INHALATION)
Status: COMPLETED | OUTPATIENT
Start: 2022-08-08 | End: 2022-08-08

## 2022-08-08 RX ORDER — INSULIN GLARGINE 100 [IU]/ML
15 INJECTION, SOLUTION SUBCUTANEOUS
Status: DISCONTINUED | OUTPATIENT
Start: 2022-08-08 | End: 2022-08-11 | Stop reason: HOSPADM

## 2022-08-08 RX ORDER — HEPARIN SODIUM 5000 [USP'U]/ML
5000 INJECTION, SOLUTION INTRAVENOUS; SUBCUTANEOUS EVERY 8 HOURS
Status: DISCONTINUED | OUTPATIENT
Start: 2022-08-08 | End: 2022-08-10

## 2022-08-08 RX ORDER — CARVEDILOL 25 MG/1
25 TABLET ORAL 2 TIMES DAILY WITH MEALS
Status: DISCONTINUED | OUTPATIENT
Start: 2022-08-08 | End: 2022-08-11 | Stop reason: HOSPADM

## 2022-08-08 RX ORDER — DEXAMETHASONE SODIUM PHOSPHATE 4 MG/ML
10 INJECTION, SOLUTION INTRA-ARTICULAR; INTRALESIONAL; INTRAMUSCULAR; INTRAVENOUS; SOFT TISSUE ONCE
Status: COMPLETED | OUTPATIENT
Start: 2022-08-08 | End: 2022-08-08

## 2022-08-08 RX ORDER — ATORVASTATIN CALCIUM 40 MG/1
80 TABLET, FILM COATED ORAL DAILY
Status: DISCONTINUED | OUTPATIENT
Start: 2022-08-09 | End: 2022-08-11 | Stop reason: HOSPADM

## 2022-08-08 RX ORDER — IPRATROPIUM BROMIDE AND ALBUTEROL SULFATE 2.5; .5 MG/3ML; MG/3ML
SOLUTION RESPIRATORY (INHALATION)
Status: COMPLETED
Start: 2022-08-08 | End: 2022-08-08

## 2022-08-08 RX ORDER — FAMOTIDINE 20 MG/1
20 TABLET, FILM COATED ORAL 2 TIMES DAILY
Status: DISCONTINUED | OUTPATIENT
Start: 2022-08-09 | End: 2022-08-09

## 2022-08-08 RX ORDER — DEXAMETHASONE 0.5 MG/1
TABLET ORAL
COMMUNITY
Start: 2022-08-02 | End: 2022-08-11

## 2022-08-08 RX ORDER — SODIUM ZIRCONIUM CYCLOSILICATE 10 G/10G
POWDER, FOR SUSPENSION ORAL
COMMUNITY
Start: 2022-07-30 | End: 2022-08-08

## 2022-08-08 RX ADMIN — PIPERACILLIN SODIUM AND TAZOBACTAM SODIUM 3.38 G: 3; .375 INJECTION, POWDER, LYOPHILIZED, FOR SOLUTION INTRAVENOUS at 22:19

## 2022-08-08 RX ADMIN — DEXAMETHASONE SODIUM PHOSPHATE 10 MG: 4 INJECTION, SOLUTION INTRAMUSCULAR; INTRAVENOUS at 02:15

## 2022-08-08 RX ADMIN — IOPAMIDOL 72 ML: 755 INJECTION, SOLUTION INTRAVENOUS at 10:01

## 2022-08-08 RX ADMIN — HYDRALAZINE HYDROCHLORIDE 100 MG: 50 TABLET, FILM COATED ORAL at 14:34

## 2022-08-08 RX ADMIN — Medication 4 UNITS: at 17:42

## 2022-08-08 RX ADMIN — GUAIFENESIN 600 MG: 600 TABLET, EXTENDED RELEASE ORAL at 22:17

## 2022-08-08 RX ADMIN — Medication 2 UNITS: at 22:24

## 2022-08-08 RX ADMIN — Medication 5 MG: at 22:17

## 2022-08-08 RX ADMIN — HEPARIN SODIUM 5000 UNITS: 5000 INJECTION INTRAVENOUS; SUBCUTANEOUS at 22:13

## 2022-08-08 RX ADMIN — LEVOFLOXACIN 750 MG: 5 INJECTION, SOLUTION INTRAVENOUS at 05:29

## 2022-08-08 RX ADMIN — FUROSEMIDE 20 MG: 10 INJECTION, SOLUTION INTRAMUSCULAR; INTRAVENOUS at 05:29

## 2022-08-08 RX ADMIN — IPRATROPIUM BROMIDE AND ALBUTEROL SULFATE 3 ML: .5; 3 SOLUTION RESPIRATORY (INHALATION) at 14:35

## 2022-08-08 RX ADMIN — Medication 15 UNITS: at 22:24

## 2022-08-08 RX ADMIN — IPRATROPIUM BROMIDE AND ALBUTEROL SULFATE 6 ML: 2.5; .5 SOLUTION RESPIRATORY (INHALATION) at 04:10

## 2022-08-08 RX ADMIN — INSULIN LISPRO 4 UNITS: 100 INJECTION, SOLUTION INTRAVENOUS; SUBCUTANEOUS at 12:13

## 2022-08-08 RX ADMIN — HYDRALAZINE HYDROCHLORIDE 100 MG: 50 TABLET, FILM COATED ORAL at 22:17

## 2022-08-08 RX ADMIN — CARVEDILOL 25 MG: 25 TABLET, FILM COATED ORAL at 17:43

## 2022-08-08 RX ADMIN — METHYLPREDNISOLONE SODIUM SUCCINATE 40 MG: 40 INJECTION, POWDER, FOR SOLUTION INTRAMUSCULAR; INTRAVENOUS at 16:56

## 2022-08-08 RX ADMIN — PIPERACILLIN SODIUM AND TAZOBACTAM SODIUM 3.38 G: 3; .375 INJECTION, POWDER, LYOPHILIZED, FOR SOLUTION INTRAVENOUS at 14:35

## 2022-08-08 RX ADMIN — METHYLPREDNISOLONE SODIUM SUCCINATE 40 MG: 40 INJECTION, POWDER, FOR SOLUTION INTRAMUSCULAR; INTRAVENOUS at 22:19

## 2022-08-08 RX ADMIN — HEPARIN SODIUM 5000 UNITS: 5000 INJECTION INTRAVENOUS; SUBCUTANEOUS at 17:43

## 2022-08-08 RX ADMIN — IPRATROPIUM BROMIDE AND ALBUTEROL SULFATE 6 ML: .5; 3 SOLUTION RESPIRATORY (INHALATION) at 02:15

## 2022-08-08 RX ADMIN — PIPERACILLIN AND TAZOBACTAM 3.38 G: 3; .375 INJECTION, POWDER, FOR SOLUTION INTRAVENOUS at 07:41

## 2022-08-08 RX ADMIN — VANCOMYCIN HYDROCHLORIDE 2000 MG: 10 INJECTION, POWDER, LYOPHILIZED, FOR SOLUTION INTRAVENOUS at 05:29

## 2022-08-08 RX ADMIN — FUROSEMIDE 40 MG: 10 INJECTION, SOLUTION INTRAMUSCULAR; INTRAVENOUS at 17:42

## 2022-08-08 NOTE — ED PROVIDER NOTES
EMERGENCY DEPARTMENT HISTORY AND PHYSICAL EXAM      Date: 8/8/2022  Patient Name: Charlie Castillo      History of Presenting Illness     Chief Complaint   Patient presents with    Shortness of Breath       History Provided By: Patient    Location/Duration/Severity/Modifying factors   Patient is a 74-year-old female with past medical history of congestive heart failure as well as other noted past medical history who presents to the emergency room with a chief complaint of shortness of breath. Patient reports that the symptoms onset yesterday. She was actually recently admitted to the hospital while she was in [facility]. She reports she was admitted in [facility] for difficulty breathing and at that time she was told she had a COVID infection. Patient denies any current chest pain or recent chest pain she had periodic diarrhea but no vomiting. No abdominal pain at present or recently. Patient has otherwise been in her usual state of health. She was admitted with similar symptoms in [facility] for about 4 days and was discharged just over a week ago. She traveled back from [facility] in a car for about 12 to 13 hours. She endorses a previous PE history after childbirth about 45 years ago. She is not anticoagulated. She does take medications for chronic lung disease although not on any home oxygen. Per EMS patient was 87 to 88% on room air and placed on CPAP and then converted to a nonrebreather by the time she arrived here. There are no other complaints, changes, or physical findings at this time.     PCP: Bernadine Fuchs MD    Current Facility-Administered Medications   Medication Dose Route Frequency Provider Last Rate Last Admin    levoFLOXacin (LEVAQUIN) 750 mg in D5W IVPB  750 mg IntraVENous Q24H Emerson COLE DO   IV Completed at 08/08/22 0726    piperacillin-tazobactam (ZOSYN) 3.375 g in 0.9% sodium chloride (MBP/ADV) 100 mL MBP  3.375 g IntraVENous Q8H Vicky Muñoz DO        [START ON 8/9/2022] vancomycin (VANCOCIN) 1,000 mg in 0.9% sodium chloride 250 mL (VIAL-MATE)  1,000 mg IntraVENous Q24H Erick Delgadillo DO        dextrose 10% infusion 0-250 mL  0-250 mL IntraVENous PRN Geraldine Solis PA        insulin lispro (HUMALOG) injection   SubCUTAneous AC&HS Erick Akers MD        glucose chewable tablet 16 g  4 Tablet Oral PRN Erick Akers MD        glucagon (GLUCAGEN) injection 1 mg  1 mg IntraMUSCular PRN Erick Akers MD        budesonide (PULMICORT) 500 mcg/2 ml nebulizer suspension  500 mcg Nebulization BID RT Erick Akers MD        And    arformoteroL (BROVANA) neb solution 15 mcg  15 mcg Nebulization BID RT Erick Akers MD        albuterol-ipratropium (DUO-NEB) 2.5 MG-0.5 MG/3 ML  3 mL Nebulization Q6H RT Erick Akers MD        [START ON 8/9/2022] aspirin chewable tablet 162 mg  162 mg Oral DAILY Erick Akers MD        [START ON 8/9/2022] atorvastatin (LIPITOR) tablet 80 mg  80 mg Oral DAILY Erick Akers MD        carvediloL (COREG) tablet 25 mg  25 mg Oral BID WITH MEALS Erick Akers MD        furosemide (LASIX) injection 40 mg  40 mg IntraVENous BID Erick Akers MD        hydrALAZINE (APRESOLINE) tablet 100 mg  100 mg Oral TID Erick Akers MD         Current Outpatient Medications   Medication Sig Dispense Refill    furosemide (LASIX) 20 mg tablet Take 1 Tablet by mouth two (2) times a day. 60 Tablet 0    hydrALAZINE (APRESOLINE) 100 mg tablet TAKE 1 TABLET BY MOUTH 2 TIMES A DAY WITH FOOD (Patient not taking: Reported on 7/9/2022)      gabapentin (NEURONTIN) 400 mg capsule Take 400 mg by mouth daily. (Patient not taking: Reported on 7/9/2022)      tiZANidine (ZANAFLEX) 4 mg tablet Take 4 mg by mouth two (2) times daily as needed for Muscle Spasm(s). (Patient not taking: Reported on 7/9/2022)      ezetimibe (ZETIA) 10 mg tablet Take 10 mg by mouth daily. calcitRIOL (ROCALTROL) 0.25 mcg capsule Take 1 Cap by mouth daily.  (Patient not taking: Reported on 7/9/2022) 30 Cap 0 albuterol-ipratropium (DUO-NEB) 2.5 mg-0.5 mg/3 ml nebu 3 mL by Nebulization route every six (6) hours as needed for Wheezing. 30 Nebule 0    atorvastatin (LIPITOR) 80 mg tablet Take 1 Tab by mouth daily. 90 Tab 3    carvediloL (COREG) 25 mg tablet Take 1 Tab by mouth two (2) times daily (with meals). 180 Tab 3    aspirin 81 mg chewable tablet Take 2 Tabs by mouth daily. 60 Tab 0    insulin detemir (LEVEMIR) 100 unit/mL injection 15 units daily for diabetes (Patient not taking: Reported on 7/9/2022) 2 Vial 0    desloratadine (CLARINEX) 5 mg tablet Take 1 Tab by mouth daily as needed for Allergies. (Patient not taking: Reported on 7/9/2022) 30 Tab 0    mometasone (NASONEX) 50 mcg/actuation nasal spray 2 Sprays by Both Nostrils route daily as needed. For allergies (Patient not taking: Reported on 7/9/2022) 3 Container 3    umeclidinium-vilanterol (ANORO ELLIPTA) 62.5-25 mcg/actuation inhaler Take 1 Puff by inhalation daily. For COPD (Patient not taking: Reported on 7/9/2022) 3 Inhaler 3       Past History     Past Medical History:  Past Medical History:   Diagnosis Date    Abnormal WBC count 5/17/2016    Anemia     Bilateral shoulder pain 9/27/2016    Chondromalacia of both patellae     COPD (chronic obstructive pulmonary disease) ([facility] [address] 9/2015    mild-mod dz; Dr Bhargavi Hughes    Diabetes [facility]) 2013    Diabetic eye exam ([facility] [address] 2016    Dilated cardiomyopathy ([facility] [address]     Dyslipidemia     Gout     Heart attack ([facility] [address] 10/18/2019    History of echocardiogram 11/14/2014    Mild LVE. EF 40%. Mild, diffuse hypk. Mild LAE. Mild MR.       Hypercholesteremia 1/08/14    Hypertension 2000    Noncompliance with medications 2/16/2016    Obesity     Orthostatic hypotension 5/17/2016    Osteoarthritis of both knees     Pain management 04/01/2016    Dr. Yves Cook     Popliteal cyst, bilateral      Vitamin D deficiency 10/16/14       Past Surgical History:  Past Surgical History:   Procedure Laterality Date    HX HEART CATHETERIZATION HX TUBAL LIGATION         Family History:  Family History   Problem Relation Age of Onset    Diabetes Mother     Hypertension Mother     Hypertension Father     Kidney Disease Maternal Aunt 48        Dialysis       Social History:  Social History     Tobacco Use    Smoking status: Former     Packs/day: 0.25     Years: 48.00     Pack years: 12.00     Types: Cigarettes     Quit date: 2018     Years since quittin.2    Smokeless tobacco: Never   Substance Use Topics    Alcohol use: No     Alcohol/week: 0.0 standard drinks    Drug use: No       Allergies:  No Known Allergies      Review of Systems     Review of Systems   Constitutional:  Negative for chills and fever. HENT:  Negative for congestion, rhinorrhea, sinus pressure and sneezing. Eyes:  Negative for visual disturbance. Respiratory:  Positive for cough, shortness of breath and wheezing. Negative for stridor. Cardiovascular:  Negative for chest pain. Gastrointestinal:  Negative for abdominal pain, diarrhea, nausea and vomiting. Genitourinary:  Negative for dysuria, frequency and urgency. Musculoskeletal:  Negative for back pain and neck pain. Skin:  Negative for rash. Neurological:  Negative for syncope, numbness and headaches. Physical Exam     Physical Exam  Vitals and nursing note reviewed. Constitutional:       General: She is not in acute distress. Appearance: Normal appearance. HENT:      Head: Normocephalic and atraumatic. Right Ear: External ear normal.      Left Ear: External ear normal.      Nose: Nose normal.      Mouth/Throat:      Mouth: Mucous membranes are moist.   Eyes:      Conjunctiva/sclera: Conjunctivae normal.   Cardiovascular:      Rate and Rhythm: Normal rate and regular rhythm. Pulses: Normal pulses. Heart sounds: Normal heart sounds. No murmur heard. Pulmonary:      Effort: Pulmonary effort is normal.      Breath sounds: Examination of the right-middle field reveals wheezing. Examination of the left-middle field reveals wheezing. Examination of the right-lower field reveals decreased breath sounds and rales. Examination of the left-lower field reveals decreased breath sounds and rales. Decreased breath sounds, wheezing and rales present. No rhonchi. Abdominal:      General: Abdomen is flat. Tenderness: There is no abdominal tenderness. There is no guarding or rebound. Musculoskeletal:         General: No swelling or tenderness. Normal range of motion. Cervical back: Normal range of motion and neck supple. Right lower leg: No tenderness. Edema present. Left lower leg: No tenderness. Edema present. Comments: 1+ edema to the shin on both legs. Skin:     General: Skin is warm and dry. Capillary Refill: Capillary refill takes less than 2 seconds. Findings: No rash. Neurological:      General: No focal deficit present. Mental Status: She is alert. Lab and Diagnostic Study Results     Labs -  Recent Results (from the past 24 hour(s))   CBC WITH AUTOMATED DIFF    Collection Time: 08/08/22  1:48 AM   Result Value Ref Range    WBC 10.3 4.6 - 13.2 K/uL    RBC 3.17 (L) 4.20 - 5.30 M/uL    HGB 8.9 (L) 12.0 - 16.0 g/dL    HCT 29.2 (L) 35.0 - 45.0 %    MCV 92.1 78.0 - 100.0 FL    MCH 28.1 24.0 - 34.0 PG    MCHC 30.5 (L) 31.0 - 37.0 g/dL    RDW 19.1 (H) 11.6 - 14.5 %    PLATELET 752 564 - 498 K/uL    MPV 9.3 9.2 - 11.8 FL    NRBC 3.0 (H) 0  WBC    ABSOLUTE NRBC 0.31 (H) 0.00 - 0.01 K/uL    NEUTROPHILS 64 40 - 73 %    LYMPHOCYTES 22 21 - 52 %    MONOCYTES 10 3 - 10 %    EOSINOPHILS 0 0 - 5 %    BASOPHILS 0 0 - 2 %    IMMATURE GRANULOCYTES 4 (H) 0.0 - 0.5 %    ABS. NEUTROPHILS 6.5 1.8 - 8.0 K/UL    ABS. LYMPHOCYTES 2.3 0.9 - 3.6 K/UL    ABS. MONOCYTES 1.0 0.05 - 1.2 K/UL    ABS. EOSINOPHILS 0.0 0.0 - 0.4 K/UL    ABS. BASOPHILS 0.0 0.0 - 0.1 K/UL    ABS. IMM.  GRANS. 0.4 (H) 0.00 - 0.04 K/UL    DF AUTOMATED     METABOLIC PANEL, BASIC    Collection Time: 08/08/22  1:48 AM   Result Value Ref Range    Sodium 142 136 - 145 mmol/L    Potassium 5.4 3.5 - 5.5 mmol/L    Chloride 112 (H) 100 - 111 mmol/L    CO2 23 21 - 32 mmol/L    Anion gap 7 3.0 - 18 mmol/L    Glucose 136 (H) 74 - 99 mg/dL    BUN 37 (H) 7.0 - 18 MG/DL    Creatinine 1.67 (H) 0.6 - 1.3 MG/DL    BUN/Creatinine ratio 22 (H) 12 - 20      GFR est AA 37 (L) >60 ml/min/1.73m2    GFR est non-AA 30 (L) >60 ml/min/1.73m2    Calcium 9.0 8.5 - 10.1 MG/DL   TROPONIN-HIGH SENSITIVITY    Collection Time: 08/08/22  1:48 AM   Result Value Ref Range    Troponin-High Sensitivity 27 0 - 54 ng/L   NT-PRO BNP    Collection Time: 08/08/22  1:48 AM   Result Value Ref Range    NT pro-BNP 1,871 (H) 0 - 900 PG/ML   MAGNESIUM    Collection Time: 08/08/22  1:48 AM   Result Value Ref Range    Magnesium 2.0 1.6 - 2.6 mg/dL   HEPATIC FUNCTION PANEL    Collection Time: 08/08/22  1:48 AM   Result Value Ref Range    Protein, total 6.1 (L) 6.4 - 8.2 g/dL    Albumin 3.2 (L) 3.4 - 5.0 g/dL    Globulin 2.9 2.0 - 4.0 g/dL    A-G Ratio 1.1 0.8 - 1.7      Bilirubin, total 0.4 0.2 - 1.0 MG/DL    Bilirubin, direct 0.1 0.0 - 0.2 MG/DL    Alk.  phosphatase 173 (H) 45 - 117 U/L    AST (SGOT) 33 10 - 38 U/L    ALT (SGPT) 75 (H) 13 - 56 U/L   COVID-19 WITH INFLUENZA A/B    Collection Time: 08/08/22  1:52 AM   Result Value Ref Range    SARS-CoV-2 by PCR Detected (AA) NOTD      Influenza A by PCR Not detected NOTD      Influenza B by PCR Not detected NOTD     EKG, 12 LEAD, INITIAL    Collection Time: 08/08/22  2:08 AM   Result Value Ref Range    Ventricular Rate 85 BPM    Atrial Rate 85 BPM    P-R Interval 170 ms    QRS Duration 80 ms    Q-T Interval 350 ms    QTC Calculation (Bezet) 416 ms    Calculated P Axis 66 degrees    Calculated R Axis 18 degrees    Calculated T Axis 87 degrees    Diagnosis       Normal sinus rhythm  Normal ECG  When compared with ECG of 08-JUL-2022 09:40,  No significant change was found  Confirmed by Jaymie Patrick ([id number]) on 8/8/2022 7:32:45 AM     BLOOD GAS, ARTERIAL POC    Collection Time: 08/08/22  6:41 AM   Result Value Ref Range    Device: BIPAP MASK      FIO2 (POC) 60 %    pH (POC) 7.32 (L) 7.35 - 7.45      pCO2 (POC) 41.0 35.0 - 45.0 MMHG    pO2 (POC) 80 80 - 100 MMHG    HCO3 (POC) 21.1 (L) 22 - 26 MMOL/L    sO2 (POC) 94.6 92 - 97 %    Base deficit (POC) 4.7 mmol/L    Tidal volume 500 ml    Set Rate 8 bpm    PEEP/CPAP (POC) 9 cmH2O    Pressure support 5 cmH2O    Allens test (POC) Positive      Inspiratory Time 14 sec    Total resp. rate 23      Site RIGHT RADIAL      Specimen type (POC) ARTERIAL      Performed by Cyndi Yen, POC    Collection Time: 08/08/22 12:02 PM   Result Value Ref Range    Glucose (POC) 202 (H) 70 - 110 mg/dL         Radiologic Studies -   CTA CHEST W OR W WO CONT   Final Result   1. No CT evidence for central pulmonary embolism.   -Similar enlargement of central pulmonary arteries suggest pulmonary artery   hypertension. 2. Multifocal geographic and peribronchial groundglass airspace infiltrates more   significant in the right lung than left. Imaging appearance is not specifically   characteristic of Covid pneumonia and could reflect viral inflammation or less   likely developing asymmetric pulmonary edema. 3. Multisegmental dependent atelectasis/consolidation right lower lobe adjacent   to a small effusion.   -Similar but less extensive subsegmental atelectasis adjacent to trace left   effusion. XR CHEST PORT   Final Result   1. Probable mild pulmonary edema. Superimposed infection is not excluded. Medical Decision Making and ED Course   - I am the first and primary provider for this patient AND AM THE PRIMARY PROVIDER OF RECORD. - I reviewed the vital signs, available nursing notes, past medical history, past surgical history, family history and social history. - Initial assessment performed.  The patients presenting problems have been discussed, and the staff are in agreement with the care plan formulated and outlined with them. I have encouraged them to ask questions as they arise throughout their visit. Vital Signs-Reviewed the patient's vital signs. Patient Vitals for the past 12 hrs:   Pulse Resp BP SpO2   08/08/22 1252 66 -- (!) 182/83 99 %   08/08/22 1156 -- -- -- 100 %   08/08/22 0752 78 -- (!) 189/93 99 %   08/08/22 0422 -- -- -- 98 %   08/08/22 0300 83 -- (!) 185/85 100 %   08/08/22 0257 83 30 (!) 174/89 100 %       EKG interpretation: See ED course for my interpretation of EKG(s). Records Reviewed: Nursing Notes and Old Medical Records        Provider Notes (Medical Decision Making):     MDM  Number of Diagnoses or Management Options  Diagnosis management comments: Patient is a 79-year-old female who presents to the emergency room with a chief complaint of shortness of breath. Recently told she had COVID infection. DDx: COVID-pneumonia, CHF, PE, ACS, COPD, bronchitis, emphysema, respiratory failure, etc.    Patient requiring 3 to 4 L right now. She was on nonrebreather although she is now on a salter with seeming improvement in her respiratory effort although she still looks to be pretty dyspneic. We will go ahead and order for her to have a CTA chest, although she does have CKD hopefully contrast reduction measures can be undertaken. This may be CHF COPD or combination. Did not seem to have abrupt worsening while in the ED, improved with BiPAP. Ordered continue neb treatments for her. COVID test was again positive. Will plan to admit her however I think we need to rule out PE persistently. Based on her history of PE as well as her recent travel and COVID-positive status as well as her new hypoxia and oxygen requirement. 6:27 AM : Pt care transferred to Dr. Chong Marin  ,ED provider. History of patient complaint(s), available diagnostic reports and current treatment plan has been discussed thoroughly.    Bedside rounding on patient occured : no . Intended disposition of patient : ADMIT and TBD  Pending diagnostics reports and/or labs (please list):     Dr. Ezio Gaona assistance in completion of this plan is greatly appreciated but it should be noted that I will be the provider of record for this patient. .           ED Course:       ED Course as of 08/08/22 1429   Mon Aug 08, 2022   0222 X-ray interpretation right-sided basilar opacities, cardiomegaly worsened compared to June 2022. [GONZALO]   8537 Review of previous BMP, slightly lower today than it was a few weeks ago. We will do CTA chest to rule out PE [GONZALO]   5163 Normal sinus rhythm, rate of 85. Normal ND, QRS and QTc intervals. Normal axis. No ST segment elevation or depression. Overall normal sinus rhythm and normal EKG. [GONZALO]   4135 Reassessed, appears acutely dyspneic. Recurrent wheezes on her exam.  Will place on BiPAP. Will request wet read of x-ray of the chest. [GONZALO]      ED Course User Index  [GONZALO] Viji Baca DO     ------------------------------------------------------------------------------------------------------------        Consultations:       Consultations: - NONE        Procedures and Critical Care       Performed by: Hali Fitch DO    Procedures             CRITICAL CARE NOTE :  1:46 AM  CRITICAL CARE TIME: I have spent 65 minutes of critical care time involved in lab review, consultations with specialist, family decision-making, and documentation. During this entire length of time I was immediately available to the patient. Critical Care: The reason for providing this level of medical care for this critically ill patient was due a critical illness that impaired one or more vital organ systems such that there was a high probability of imminent or life threatening deterioration in the patients condition.  This care involved high complexity decision making to assess, manipulate, and support vital system functions, to treat this vital organ system failure and to prevent further life threatening deterioration of the patients condition. Time is exclusive of procedural and teaching time. Alray Abler, DO      Alray Abler, DO        Disposition         Diagnosis:   1. Acute respiratory failure with hypoxia ([facility] [address]    2. COVID-19    3. History of chronic obstructive lung disease          Disposition: TBD/ADMIT    Follow-up Information    None         Patient's Medications   Start Taking    No medications on file   Continue Taking    ALBUTEROL-IPRATROPIUM (DUO-NEB) 2.5 MG-0.5 MG/3 ML NEBU    3 mL by Nebulization route every six (6) hours as needed for Wheezing. ASPIRIN 81 MG CHEWABLE TABLET    Take 2 Tabs by mouth daily. ATORVASTATIN (LIPITOR) 80 MG TABLET    Take 1 Tab by mouth daily. CALCITRIOL (ROCALTROL) 0.25 MCG CAPSULE    Take 1 Cap by mouth daily. CARVEDILOL (COREG) 25 MG TABLET    Take 1 Tab by mouth two (2) times daily (with meals). DESLORATADINE (CLARINEX) 5 MG TABLET    Take 1 Tab by mouth daily as needed for Allergies. EZETIMIBE (ZETIA) 10 MG TABLET    Take 10 mg by mouth daily. FUROSEMIDE (LASIX) 20 MG TABLET    Take 1 Tablet by mouth two (2) times a day. GABAPENTIN (NEURONTIN) 400 MG CAPSULE    Take 400 mg by mouth daily. HYDRALAZINE (APRESOLINE) 100 MG TABLET    TAKE 1 TABLET BY MOUTH 2 TIMES A DAY WITH FOOD    INSULIN DETEMIR (LEVEMIR) 100 UNIT/ML INJECTION    15 units daily for diabetes    MOMETASONE (NASONEX) 50 MCG/ACTUATION NASAL SPRAY    2 Sprays by Both Nostrils route daily as needed. For allergies    TIZANIDINE (ZANAFLEX) 4 MG TABLET    Take 4 mg by mouth two (2) times daily as needed for Muscle Spasm(s). UMECLIDINIUM-VILANTEROL (ANORO ELLIPTA) 62.5-25 MCG/ACTUATION INHALER    Take 1 Puff by inhalation daily. For COPD   These Medications have changed    No medications on file   Stop Taking    No medications on file         Diagnosis     Clinical Impression:   1.  Acute respiratory failure with hypoxia ([facility] [address] 2. COVID-19    3. History of chronic obstructive lung disease        Attestations:    Ronald George, DO    Please note that this dictation was completed with [employer], the computer voice recognition software. Quite often unanticipated grammatical, syntax, homophones, and other interpretive errors are inadvertently transcribed by the computer software. Please disregard these errors. Please excuse any errors that have escaped final proofreading. Thank you.

## 2022-08-08 NOTE — ED TRIAGE NOTES
Patient comes in by EMS for complaints of shortness of breath. Patient states that she was in [place] about 2 weeks ago and was hospitalized because she could not breathe. Patient was positive for Covid. Patient was discharged on Saturday and returned to [place] on Sunday. Patient comes in on a NRB.

## 2022-08-08 NOTE — ED NOTES
TRANSFER - OUT REPORT:    Verbal report given to FLORENCIA Duron on Gloria Francois  being transferred to [facility] for routine progression of care       Report consisted of patients Situation, Background, Assessment and   Recommendations(SBAR). Information from the following report(s) SBAR, ED Summary, MAR, and Recent Results was reviewed with the receiving nurse. Opportunity for questions and clarification was provided.       Patient transported with:   Registered Nurse  BIPAP

## 2022-08-08 NOTE — PROGRESS NOTES
[address] [facility] Pharmacokinetic Monitoring Service - Vancomycin     Sunday Faisal is a 81 y.o. female starting on vancomycin therapy for sepsis. Pharmacy consulted by Lefty Walton MD for monitoring and adjustment. Target Concentration: Goal AUC/STEPHANIE 400-600 mg*hr/L    Additional Antimicrobials: Zosyn, Levaquin    Pertinent Laboratory Values: Wt Readings from Last 1 Encounters:   07/10/22 104.1 kg (229 lb 9.6 oz)     Temp Readings from Last 1 Encounters:   08/08/22 98.4 °F (36.9 °C)     No components found for: PROCAL  CrCl cannot be calculated (Unknown ideal weight. ). Recent Labs     08/08/22  0148   WBC 10.3       Pertinent Cultures:  Culture Date Source Results   08/08 Blood -   MRSA Nasal Swab: was ordered by provider, awaiting results. .    Plan:  Dosing recommendations based on Bayesian software  Start vancomycin 2 gm x 1 followed by 1 gm q24h  Anticipated AUC of 438 and trough concentration of 14.3 at steady state  Renal labs as indicated   Vancomycin concentration ordered for 08/11 @ 0400   Pharmacy will continue to monitor patient and adjust therapy as indicated    Thank you for the consult,  Leander Jaramillo, [address]  8/8/2022 4:49 AM

## 2022-08-08 NOTE — ED TRIAGE NOTES
EMS from home for SOB, COVID+ 2 weeks ago. Req CPAP in route for RA sat of 88%, dyspneic. Wheezes developed on arrival to room, note present en route per EMS.  On NRB, sat 100%

## 2022-08-08 NOTE — ED NOTES
Attempted laying pt flat for resp assessment trial of position prior to CT. Pt increase in dyspnea, HR, and RR. Consulting with ED MD Belen Merlos at this time about this.

## 2022-08-08 NOTE — PROGRESS NOTES
CTA Delay    0805 - per FLORENCIA Tim pt still having trouble laying flat - going to try nonrebreather - will call CT when pt is able to tolerate positioning for exam - rambo     0506-per nurse Mary-pt. Is on BIPAP and not ready for ct @ this time. Will call ct when ready/jpa    0408-pt. Unable to lie flat when transportation went to pickup pt.  On hold/jpa

## 2022-08-08 NOTE — H&P
History and Physical          Subjective     HPI: Marianna Wen is a 81 y.o. female with a PMHx of HF, COPD, CKD3b who presented to the ED with SOB that has been present since she left the hospital in [facility] approximately 1 week ago. It is worse with lying flat. SOB was not improved with rescue inhalers. She was hospitalized on 7/26 due to \"not being able to breathe\" and was discharged on 7/30. She states they told her she had COVID, and they gave her an infusion for 3 or 4 days but does not remember what it was called. She also remembers that they gave her steroids, and sent her home on spironolactone. She reports associated nonproductive cough, nasal congestion, leg swelling, and pleuritic back pain with coughing. She denies fever, abdominal pain, NVD. Spo2 per EMS was 88%, she was placed on bipap. She received nebulizer treatments, IV steroids, lasix, and IV abx in the ED. Renal function at baseline. BNP 1871, previously 3594 in July. Procal <0.05. CTA neg for PE, but shows groundglass infiltrates and atelectasis vs consolidation in th RLL adjacent to a small effusion on right with similar findings on left. PMHx:  Past Medical History:   Diagnosis Date    Abnormal WBC count 5/17/2016    Anemia     Bilateral shoulder pain 9/27/2016    Chondromalacia of both patellae     COPD (chronic obstructive pulmonary disease) ([facility] [address] 9/2015    mild-mod dz; Dr King Palma    Diabetes [facility]) 2013    Diabetic eye exam ([facility] [facility] 2016    Dilated cardiomyopathy ([facility] [address]     Dyslipidemia     Gout     Heart attack ([facility] [address] 10/18/2019    History of echocardiogram 11/14/2014    Mild LVE. EF 40%. Mild, diffuse hypk. Mild LAE. Mild MR.       Hypercholesteremia 1/08/14    Hypertension 2000    Noncompliance with medications 2/16/2016    Obesity     Orthostatic hypotension 5/17/2016    Osteoarthritis of both knees     Pain management 04/01/2016    Dr. Kelsi West     Popliteal cyst, bilateral      Vitamin D deficiency 10/16/14 PSurgHx:  Past Surgical History:   Procedure Laterality Date    HX HEART CATHETERIZATION      HX TUBAL LIGATION         SocialHx:  Social History     Socioeconomic History    Marital status:     Number of children: 3   Occupational History    Occupation: retired   Tobacco Use    Smoking status: Former     Packs/day: 0.25     Years: 48.00     Pack years: 12.00     Types: Cigarettes     Quit date: 2018     Years since quittin.2    Smokeless tobacco: Never   Substance and Sexual Activity    Alcohol use: No     Alcohol/week: 0.0 standard drinks    Drug use: No    Sexual activity: Yes     Partners: Male   Other Topics Concern     Service No    Blood Transfusions No    Caffeine Concern No    Occupational Exposure No    Hobby Hazards No    Sleep Concern No    Stress Concern No    Weight Concern No    Special Diet No    Back Care No    Exercise No    Bike Helmet No    Seat Belt Yes    Self-Exams Yes       FamilyHx:  Family History   Problem Relation Age of Onset    Diabetes Mother     Hypertension Mother     Hypertension Father     Kidney Disease Maternal Aunt 50        Dialysis       Home Medications:  Prior to Admission Medications   Prescriptions Last Dose Informant Taking? albuterol-ipratropium (DUO-NEB) 2.5 mg-0.5 mg/3 ml nebu   No   Sig: 3 mL by Nebulization route every six (6) hours as needed for Wheezing. aspirin 81 mg chewable tablet   No   Sig: Take 2 Tabs by mouth daily. atorvastatin (LIPITOR) 80 mg tablet   No   Sig: Take 1 Tab by mouth daily. calcitRIOL (ROCALTROL) 0.25 mcg capsule 2022  Yes   Sig: Take 1 Cap by mouth daily. carvediloL (COREG) 25 mg tablet   No   Sig: Take 1 Tab by mouth two (2) times daily (with meals).    dexAMETHasone (DECADRON) 0.5 mg tablet   No   Sig: TAKE 2 TABLET BY MOUTH (1MG TOTAL) EVERY 12 HOURS FOR 5 DAYS   furosemide (LASIX) 20 mg tablet   No   Sig: Take 1 Tablet by mouth two (2) times a day.   gabapentin (NEURONTIN) 400 mg capsule 2022  Yes   Sig: Take 400 mg by mouth in the morning. hydrALAZINE (APRESOLINE) 100 mg tablet   No   Sig: TAKE 1 TABLET BY MOUTH 2 TIMES A DAY WITH FOOD   Patient not taking: Reported on 2022   insulin detemir (LEVEMIR) 100 unit/mL injection   No   Sig: 15 units daily for diabetes   Patient not taking: Reported on 2022   mometasone (NASONEX) 50 mcg/actuation nasal spray   No   Si Sprays by Both Nostrils route daily as needed. For allergies   Patient not taking: Reported on 2022   tiZANidine (ZANAFLEX) 4 mg tablet 2022  Yes   Sig: Take 4 mg by mouth two (2) times daily as needed for Muscle Spasm(s). umeclidinium-vilanterol (ANORO ELLIPTA) 62.5-25 mcg/actuation inhaler 2022  Yes   Sig: Take 1 Puff by inhalation daily. For COPD      Facility-Administered Medications: None         Allergies:  No Known Allergies     Review of Systems:  CONST: no weight loss/gain, no fever or chills, no fatigue  ENT: No earache, no tinnitus, no sore throat + sinus congestion. PULM: + shortness of breath, + cough + wheeze. CV: no pnd + orthopnea, no CP, no palpitations, + edema  GI: No abdominal pain, no nausea, no vomiting or diarrhea  : No urinary frequency, no urgency, no hesitancy or dysuria. MSK: No joint or muscle pain, + back pain, no neck pain, no recent trauma. INTEG: No rash, no itching, no lesions. ENDO: No polyuria, no polydipsia, no heat or cold intolerance.    NEURO: No headache, no dizziness  PSYCH: No anxiety, no depression      Objective     Physical Exam:  Visit Vitals  BP (!) 182/83   Pulse 66   Temp 98.4 °F (36.9 °C)   Resp 30   Ht 5' 4\" (1.626 m)   Wt 106.6 kg (235 lb)   SpO2 99%   BMI 40.34 kg/m²       General:  appears stated age, alert  Skin: warm, dry, no rashes  Eyes: sclera is non-icteric  HENT: normocephalic/atraumatic, moist mucus membranes  Respiratory: diffuse coarse breath sounds with expiratory wheezing, tachypnea and slightly increased work of breathing  Cardiovascular: RRR, no m/r/g, no cyanosis trace pitting edema of BLE  GI: soft, non-tender, normal bowel sounds  Neuro: moves all extremities, normal speech  Psych: appropriate mood and affect    Laboratory Studies:  Recent Results (from the past 24 hour(s))   CBC WITH AUTOMATED DIFF    Collection Time: 08/08/22  1:48 AM   Result Value Ref Range    WBC 10.3 4.6 - 13.2 K/uL    RBC 3.17 (L) 4.20 - 5.30 M/uL    HGB 8.9 (L) 12.0 - 16.0 g/dL    HCT 29.2 (L) 35.0 - 45.0 %    MCV 92.1 78.0 - 100.0 FL    MCH 28.1 24.0 - 34.0 PG    MCHC 30.5 (L) 31.0 - 37.0 g/dL    RDW 19.1 (H) 11.6 - 14.5 %    PLATELET 497 438 - 543 K/uL    MPV 9.3 9.2 - 11.8 FL    NRBC 3.0 (H) 0  WBC    ABSOLUTE NRBC 0.31 (H) 0.00 - 0.01 K/uL    NEUTROPHILS 64 40 - 73 %    LYMPHOCYTES 22 21 - 52 %    MONOCYTES 10 3 - 10 %    EOSINOPHILS 0 0 - 5 %    BASOPHILS 0 0 - 2 %    IMMATURE GRANULOCYTES 4 (H) 0.0 - 0.5 %    ABS. NEUTROPHILS 6.5 1.8 - 8.0 K/UL    ABS. LYMPHOCYTES 2.3 0.9 - 3.6 K/UL    ABS. MONOCYTES 1.0 0.05 - 1.2 K/UL    ABS. EOSINOPHILS 0.0 0.0 - 0.4 K/UL    ABS. BASOPHILS 0.0 0.0 - 0.1 K/UL    ABS. IMM.  GRANS. 0.4 (H) 0.00 - 0.04 K/UL    DF AUTOMATED     METABOLIC PANEL, BASIC    Collection Time: 08/08/22  1:48 AM   Result Value Ref Range    Sodium 142 136 - 145 mmol/L    Potassium 5.4 3.5 - 5.5 mmol/L    Chloride 112 (H) 100 - 111 mmol/L    CO2 23 21 - 32 mmol/L    Anion gap 7 3.0 - 18 mmol/L    Glucose 136 (H) 74 - 99 mg/dL    BUN 37 (H) 7.0 - 18 MG/DL    Creatinine 1.67 (H) 0.6 - 1.3 MG/DL    BUN/Creatinine ratio 22 (H) 12 - 20      GFR est AA 37 (L) >60 ml/min/1.73m2    GFR est non-AA 30 (L) >60 ml/min/1.73m2    Calcium 9.0 8.5 - 10.1 MG/DL   TROPONIN-HIGH SENSITIVITY    Collection Time: 08/08/22  1:48 AM   Result Value Ref Range    Troponin-High Sensitivity 27 0 - 54 ng/L   NT-PRO BNP    Collection Time: 08/08/22  1:48 AM   Result Value Ref Range    NT pro-BNP 1,871 (H) 0 - 900 PG/ML   MAGNESIUM    Collection Time: 08/08/22 1:48 AM   Result Value Ref Range    Magnesium 2.0 1.6 - 2.6 mg/dL   HEPATIC FUNCTION PANEL    Collection Time: 08/08/22  1:48 AM   Result Value Ref Range    Protein, total 6.1 (L) 6.4 - 8.2 g/dL    Albumin 3.2 (L) 3.4 - 5.0 g/dL    Globulin 2.9 2.0 - 4.0 g/dL    A-G Ratio 1.1 0.8 - 1.7      Bilirubin, total 0.4 0.2 - 1.0 MG/DL    Bilirubin, direct 0.1 0.0 - 0.2 MG/DL    Alk. phosphatase 173 (H) 45 - 117 U/L    AST (SGOT) 33 10 - 38 U/L    ALT (SGPT) 75 (H) 13 - 56 U/L   COVID-19 WITH INFLUENZA A/B    Collection Time: 08/08/22  1:52 AM   Result Value Ref Range    SARS-CoV-2 by PCR Detected (AA) NOTD      Influenza A by PCR Not detected NOTD      Influenza B by PCR Not detected NOTD     EKG, 12 LEAD, INITIAL    Collection Time: 08/08/22  2:08 AM   Result Value Ref Range    Ventricular Rate 85 BPM    Atrial Rate 85 BPM    P-R Interval 170 ms    QRS Duration 80 ms    Q-T Interval 350 ms    QTC Calculation (Bezet) 416 ms    Calculated P Axis 66 degrees    Calculated R Axis 18 degrees    Calculated T Axis 87 degrees    Diagnosis       Normal sinus rhythm  Normal ECG  When compared with ECG of 08-JUL-2022 09:40,  No significant change was found  Confirmed by Silver Bowels ([id number]) on 8/8/2022 7:32:45 AM     PROCALCITONIN    Collection Time: 08/08/22  5:30 AM   Result Value Ref Range    Procalcitonin <0.05 ng/mL   BLOOD GAS, ARTERIAL POC    Collection Time: 08/08/22  6:41 AM   Result Value Ref Range    Device: BIPAP MASK      FIO2 (POC) 60 %    pH (POC) 7.32 (L) 7.35 - 7.45      pCO2 (POC) 41.0 35.0 - 45.0 MMHG    pO2 (POC) 80 80 - 100 MMHG    HCO3 (POC) 21.1 (L) 22 - 26 MMOL/L    sO2 (POC) 94.6 92 - 97 %    Base deficit (POC) 4.7 mmol/L    Tidal volume 500 ml    Set Rate 8 bpm    PEEP/CPAP (POC) 9 cmH2O    Pressure support 5 cmH2O    Allens test (POC) Positive      Inspiratory Time 14 sec    Total resp.  rate 23      Site RIGHT RADIAL      Specimen type (POC) ARTERIAL      Performed by TOD Zimmer Collection Time: 08/08/22 12:02 PM   Result Value Ref Range    Glucose (POC) 202 (H) 70 - 110 mg/dL       Imaging Reviewed:  CTA CHEST W OR W WO CONT    Result Date: 8/8/2022  CT CHEST PULMONARY EMBOLISM PROTOCOL CPT code: 61597 INDICATION: Dyspnea. Covid positive. Question pulmonary embolism. TECHNIQUE: 8.7OG collimation helical images obtained through the level of the pulmonary arteries with additional imaging through the chest following the uneventful administration of 72 cc's nonionic intravenous contrast. - Images reconstructed into three dimensional coronal and sagittal projections for complete evaluation of the tortuous and overlapping pulmonary vascular structures and to reduce patient radiation dose. All CT scans at this facility are performed using dose optimization technique as appropriate to this specific exam, to include automated exposure control, adjustment of the mA and/or KP according to patient size or use of iterative reconstruction techniques. COMPARISON: Prior CT 7/8/2022 FINDINGS: Opacification of the pulmonary arteries is adequate. No filling defects are appreciated within the main, left, right, lobar or visualized segmental pulmonary arteries to suggest embolism. Main pulmonary artery is enlarged, measuring up to 3.8 cm diameter, similar to the prior exam. Findings can be seen in setting of pulmonary artery hypertension. Heart size top normal. No pericardial effusion. Nonenlarged aorta. Atherosclerotic calcification in the arch and at level of proximal arch branch vessels. Geographic areas of mildly increased groundglass attenuation in the right upper lobe fairly diffuse, without consolidation. Less extensive changes in the superior aspect of the right middle lobe. Similar findings in the superior segment right lower lobe and ventral to central right lower lobe. -Dependent peripheral atelectasis/consolidation subsegmental right lower lobe adjacent to a small effusion.  Similar appearing subsegmental dependent atelectasis left lower lobe adjacent to a trace effusion. Mild degree atherosclerotic narrowing of the proximal SMA. 1. No CT evidence for central pulmonary embolism. -Similar enlargement of central pulmonary arteries suggest pulmonary artery hypertension. 2. Multifocal geographic and peribronchial groundglass airspace infiltrates more significant in the right lung than left. Imaging appearance is not specifically characteristic of Covid pneumonia and could reflect viral inflammation or less likely developing asymmetric pulmonary edema. 3. Multisegmental dependent atelectasis/consolidation right lower lobe adjacent to a small effusion. -Similar but less extensive subsegmental atelectasis adjacent to trace left effusion. XR CHEST PORT    Result Date: 8/8/2022  Examination: Portable AP chest History: Dyspnea Comparison: July 8, 2022 Findings: Interstitial airspace disease. There is no significant pleural effusion. No pneumothorax. Cardiomegaly is stable. Atherosclerosis of aortic arch. 1.  Probable mild pulmonary edema. Superimposed infection is not excluded.           Assessment/Plan     Hospital Problems  Date Reviewed: 4/26/2021            Codes Class Noted POA    Pneumonia due to COVID-19 virus ICD-10-CM: U07.1, J12.82  ICD-9-CM: 480.8, 079.89  8/8/2022 Yes        Acute on chronic diastolic heart failure ([facility] ICD-10-CM: I50.33  ICD-9-CM: 428.33  8/8/2022 Yes        Stage 3b chronic kidney disease ([facility] ICD-10-CM: N18.32  ICD-9-CM: 585.3  8/8/2022 Yes        * (Principal) Acute respiratory failure with hypoxia ([facility] ICD-10-CM: J96.01  ICD-9-CM: 518.81  10/18/2019 Yes        Type 2 diabetes mellitus with hyperglycemia (HCC) ICD-10-CM: E11.65  ICD-9-CM: 250.00  8/26/2019 Yes        Coronary artery disease involving native coronary artery of native heart without angina pectoris ICD-10-CM: I25.10  ICD-9-CM: 414.01  5/31/2018 Yes        Essential hypertension (Chronic) ICD-10-CM: I10  ICD-9-CM: 401.9  9/17/2015 Yes        Chronic obstructive pulmonary disease with acute exacerbation [facility]) ICD-10-CM: J44.1  ICD-9-CM: 491.21  9/1/2015 Yes         Acute resp failure with hypoxia: due to COVID pneumonia (diagnosed nearly 2 weeks ago), COPD with AE, acute on chronic diastolic HF. CTA negative for PE. Low suspicion for bacterial PNA given normal CBC and procal, afebrile. - wean O2 as tolerated. Patient is not on home O2. Has been off bipap x2 hours. O2 remains stable on NC. Have recommended to place patient back on bipap due to wob. - scheduled nebs, IV steroids  - pulmicort, brovana  - mucinex, IS  - recent echo in June. Results reviewed.   - IV lasix, monitor BMP  - strict I&Os, daily weight  - cont IV abx for now until blood cultures result  - check strep, legionella UrAg  - droplet plus  - ID consulted (Dr. Marianna Silva)    DM  - lantus, ssi, monitor achs  - recent a1c 7.2%    HTN  - cont coreg, hydralazine    CKD3b: at baseline  - monitor BMP        Anticipated Discharge: >2 days    DVT Prophylaxis:  []Lovenox  [x]Hep SQ  []SCDs  []Coumadin []DOAC  []On Heparin gtt     Time spent reviewing records, independently interpreting results, obtaining history from patient or caregiver, performing physical exam, ordering tests and medications, communicating with specialists, documenting in the chart, and coordinating overall care is  >55 minutes      Judi Restrepo PA-C  [address] [facility]  Office:  [phone]  Pager: [phone]

## 2022-08-09 LAB
ANION GAP SERPL CALC-SCNC: 7 MMOL/L (ref 3–18)
BASOPHILS # BLD: 0 K/UL (ref 0–0.1)
BASOPHILS NFR BLD: 0 % (ref 0–2)
BUN SERPL-MCNC: 44 MG/DL (ref 7–18)
BUN/CREAT SERPL: 22 (ref 12–20)
CALCIUM SERPL-MCNC: 8.9 MG/DL (ref 8.5–10.1)
CHLORIDE SERPL-SCNC: 108 MMOL/L (ref 100–111)
CO2 SERPL-SCNC: 24 MMOL/L (ref 21–32)
COVID-19 RAPID TEST, COVR: DETECTED
CREAT SERPL-MCNC: 2.01 MG/DL (ref 0.6–1.3)
CRP SERPL-MCNC: 0.4 MG/DL (ref 0–0.3)
DIFFERENTIAL METHOD BLD: ABNORMAL
EOSINOPHIL # BLD: 0 K/UL (ref 0–0.4)
EOSINOPHIL NFR BLD: 0 % (ref 0–5)
ERYTHROCYTE [DISTWIDTH] IN BLOOD BY AUTOMATED COUNT: 19.2 % (ref 11.6–14.5)
FERRITIN SERPL-MCNC: 80 NG/ML (ref 8–388)
GLUCOSE BLD STRIP.AUTO-MCNC: 160 MG/DL (ref 70–110)
GLUCOSE BLD STRIP.AUTO-MCNC: 172 MG/DL (ref 70–110)
GLUCOSE BLD STRIP.AUTO-MCNC: 191 MG/DL (ref 70–110)
GLUCOSE BLD STRIP.AUTO-MCNC: 290 MG/DL (ref 70–110)
GLUCOSE SERPL-MCNC: 146 MG/DL (ref 74–99)
HCT VFR BLD AUTO: 31.3 % (ref 35–45)
HGB BLD-MCNC: 9.5 G/DL (ref 12–16)
IMM GRANULOCYTES # BLD AUTO: 0.3 K/UL (ref 0–0.04)
IMM GRANULOCYTES NFR BLD AUTO: 3 % (ref 0–0.5)
L PNEUMO AG UR QL IA: NEGATIVE
LDH SERPL L TO P-CCNC: 298 U/L (ref 81–234)
LYMPHOCYTES # BLD: 0.6 K/UL (ref 0.9–3.6)
LYMPHOCYTES NFR BLD: 5 % (ref 21–52)
MCH RBC QN AUTO: 27.2 PG (ref 24–34)
MCHC RBC AUTO-ENTMCNC: 30.4 G/DL (ref 31–37)
MCV RBC AUTO: 89.7 FL (ref 78–100)
MONOCYTES # BLD: 0.3 K/UL (ref 0.05–1.2)
MONOCYTES NFR BLD: 3 % (ref 3–10)
NEUTS SEG # BLD: 10.1 K/UL (ref 1.8–8)
NEUTS SEG NFR BLD: 89 % (ref 40–73)
NRBC # BLD: 0.24 K/UL (ref 0–0.01)
NRBC BLD-RTO: 2.1 PER 100 WBC
PLATELET # BLD AUTO: 312 K/UL (ref 135–420)
PMV BLD AUTO: 10.3 FL (ref 9.2–11.8)
POTASSIUM SERPL-SCNC: 5.4 MMOL/L (ref 3.5–5.5)
RBC # BLD AUTO: 3.49 M/UL (ref 4.2–5.3)
S PNEUM AG UR QL: NEGATIVE
SODIUM SERPL-SCNC: 139 MMOL/L (ref 136–145)
SOURCE, COVRS: ABNORMAL
WBC # BLD AUTO: 11.3 K/UL (ref 4.6–13.2)

## 2022-08-09 PROCEDURE — 74011000258 HC RX REV CODE- 258: Performed by: PHYSICIAN ASSISTANT

## 2022-08-09 PROCEDURE — 87635 SARS-COV-2 COVID-19 AMP PRB: CPT

## 2022-08-09 PROCEDURE — 99233 SBSQ HOSP IP/OBS HIGH 50: CPT | Performed by: STUDENT IN AN ORGANIZED HEALTH CARE EDUCATION/TRAINING PROGRAM

## 2022-08-09 PROCEDURE — 97535 SELF CARE MNGMENT TRAINING: CPT

## 2022-08-09 PROCEDURE — 2709999900 HC NON-CHARGEABLE SUPPLY

## 2022-08-09 PROCEDURE — 82962 GLUCOSE BLOOD TEST: CPT

## 2022-08-09 PROCEDURE — 77010033678 HC OXYGEN DAILY

## 2022-08-09 PROCEDURE — 74011250636 HC RX REV CODE- 250/636: Performed by: PHYSICIAN ASSISTANT

## 2022-08-09 PROCEDURE — 97165 OT EVAL LOW COMPLEX 30 MIN: CPT

## 2022-08-09 PROCEDURE — 74011000250 HC RX REV CODE- 250: Performed by: PHYSICIAN ASSISTANT

## 2022-08-09 PROCEDURE — 94660 CPAP INITIATION&MGMT: CPT

## 2022-08-09 PROCEDURE — 74011250637 HC RX REV CODE- 250/637: Performed by: INTERNAL MEDICINE

## 2022-08-09 PROCEDURE — 82728 ASSAY OF FERRITIN: CPT

## 2022-08-09 PROCEDURE — 83615 LACTATE (LD) (LDH) ENZYME: CPT

## 2022-08-09 PROCEDURE — 85025 COMPLETE CBC W/AUTO DIFF WBC: CPT

## 2022-08-09 PROCEDURE — 77030018842 HC SOL IRR SOD CL 9% BAXT -A

## 2022-08-09 PROCEDURE — 74011636637 HC RX REV CODE- 636/637: Performed by: PHYSICIAN ASSISTANT

## 2022-08-09 PROCEDURE — 74011636637 HC RX REV CODE- 636/637: Performed by: STUDENT IN AN ORGANIZED HEALTH CARE EDUCATION/TRAINING PROGRAM

## 2022-08-09 PROCEDURE — 94762 N-INVAS EAR/PLS OXIMTRY CONT: CPT

## 2022-08-09 PROCEDURE — 36415 COLL VENOUS BLD VENIPUNCTURE: CPT

## 2022-08-09 PROCEDURE — 74011250637 HC RX REV CODE- 250/637: Performed by: PHYSICIAN ASSISTANT

## 2022-08-09 PROCEDURE — 94640 AIRWAY INHALATION TREATMENT: CPT

## 2022-08-09 PROCEDURE — 65660000004 HC RM CVT STEPDOWN

## 2022-08-09 PROCEDURE — 77030038269 HC DRN EXT URIN PURWCK BARD -A

## 2022-08-09 PROCEDURE — 86140 C-REACTIVE PROTEIN: CPT

## 2022-08-09 PROCEDURE — 80048 BASIC METABOLIC PNL TOTAL CA: CPT

## 2022-08-09 RX ORDER — FAMOTIDINE 20 MG/1
20 TABLET, FILM COATED ORAL DAILY
Status: DISCONTINUED | OUTPATIENT
Start: 2022-08-10 | End: 2022-08-11 | Stop reason: HOSPADM

## 2022-08-09 RX ORDER — VANCOMYCIN HYDROCHLORIDE
1250 ONCE
Status: DISCONTINUED | OUTPATIENT
Start: 2022-08-09 | End: 2022-08-09

## 2022-08-09 RX ADMIN — BUDESONIDE 500 MCG: 0.5 SUSPENSION RESPIRATORY (INHALATION) at 08:56

## 2022-08-09 RX ADMIN — GABAPENTIN 400 MG: 400 CAPSULE ORAL at 08:13

## 2022-08-09 RX ADMIN — GUAIFENESIN 600 MG: 600 TABLET, EXTENDED RELEASE ORAL at 08:13

## 2022-08-09 RX ADMIN — METHYLPREDNISOLONE SODIUM SUCCINATE 40 MG: 40 INJECTION, POWDER, FOR SOLUTION INTRAMUSCULAR; INTRAVENOUS at 05:33

## 2022-08-09 RX ADMIN — Medication 9 UNITS: at 11:42

## 2022-08-09 RX ADMIN — HEPARIN SODIUM 5000 UNITS: 5000 INJECTION INTRAVENOUS; SUBCUTANEOUS at 14:06

## 2022-08-09 RX ADMIN — GUAIFENESIN 600 MG: 600 TABLET, EXTENDED RELEASE ORAL at 21:57

## 2022-08-09 RX ADMIN — CARVEDILOL 25 MG: 25 TABLET, FILM COATED ORAL at 08:13

## 2022-08-09 RX ADMIN — HEPARIN SODIUM 5000 UNITS: 5000 INJECTION INTRAVENOUS; SUBCUTANEOUS at 05:33

## 2022-08-09 RX ADMIN — ARFORMOTEROL TARTRATE 15 MCG: 15 SOLUTION RESPIRATORY (INHALATION) at 21:51

## 2022-08-09 RX ADMIN — IPRATROPIUM BROMIDE AND ALBUTEROL SULFATE 3 ML: .5; 3 SOLUTION RESPIRATORY (INHALATION) at 21:52

## 2022-08-09 RX ADMIN — FUROSEMIDE 40 MG: 10 INJECTION, SOLUTION INTRAMUSCULAR; INTRAVENOUS at 17:17

## 2022-08-09 RX ADMIN — FAMOTIDINE 20 MG: 20 TABLET ORAL at 08:13

## 2022-08-09 RX ADMIN — HEPARIN SODIUM 5000 UNITS: 5000 INJECTION INTRAVENOUS; SUBCUTANEOUS at 21:58

## 2022-08-09 RX ADMIN — IPRATROPIUM BROMIDE AND ALBUTEROL SULFATE 3 ML: .5; 3 SOLUTION RESPIRATORY (INHALATION) at 08:55

## 2022-08-09 RX ADMIN — ATORVASTATIN CALCIUM 80 MG: 40 TABLET, FILM COATED ORAL at 08:13

## 2022-08-09 RX ADMIN — BUDESONIDE 500 MCG: 0.5 SUSPENSION RESPIRATORY (INHALATION) at 21:51

## 2022-08-09 RX ADMIN — PIPERACILLIN SODIUM AND TAZOBACTAM SODIUM 3.38 G: 3; .375 INJECTION, POWDER, LYOPHILIZED, FOR SOLUTION INTRAVENOUS at 05:32

## 2022-08-09 RX ADMIN — Medication 3 UNITS: at 21:59

## 2022-08-09 RX ADMIN — CARVEDILOL 25 MG: 25 TABLET, FILM COATED ORAL at 17:17

## 2022-08-09 RX ADMIN — FUROSEMIDE 40 MG: 10 INJECTION, SOLUTION INTRAMUSCULAR; INTRAVENOUS at 08:13

## 2022-08-09 RX ADMIN — HYDRALAZINE HYDROCHLORIDE 100 MG: 50 TABLET, FILM COATED ORAL at 21:56

## 2022-08-09 RX ADMIN — IPRATROPIUM BROMIDE AND ALBUTEROL SULFATE 3 ML: .5; 3 SOLUTION RESPIRATORY (INHALATION) at 14:31

## 2022-08-09 RX ADMIN — Medication 3 UNITS: at 17:16

## 2022-08-09 RX ADMIN — AZITHROMYCIN MONOHYDRATE 500 MG: 500 INJECTION, POWDER, LYOPHILIZED, FOR SOLUTION INTRAVENOUS at 08:12

## 2022-08-09 RX ADMIN — Medication 15 UNITS: at 21:59

## 2022-08-09 RX ADMIN — Medication 2 UNITS: at 08:14

## 2022-08-09 RX ADMIN — ARFORMOTEROL TARTRATE 15 MCG: 15 SOLUTION RESPIRATORY (INHALATION) at 08:56

## 2022-08-09 RX ADMIN — HYDRALAZINE HYDROCHLORIDE 100 MG: 50 TABLET, FILM COATED ORAL at 17:17

## 2022-08-09 RX ADMIN — METHYLPREDNISOLONE SODIUM SUCCINATE 40 MG: 40 INJECTION, POWDER, FOR SOLUTION INTRAMUSCULAR; INTRAVENOUS at 14:06

## 2022-08-09 RX ADMIN — ASPIRIN 81 MG CHEWABLE TABLET 162 MG: 81 TABLET CHEWABLE at 08:13

## 2022-08-09 RX ADMIN — HYDRALAZINE HYDROCHLORIDE 100 MG: 50 TABLET, FILM COATED ORAL at 08:13

## 2022-08-09 NOTE — PROGRESS NOTES
[address] [facility] Pharmacokinetic Monitoring Service - Vancomycin    Indication: sepsis  Target Concentration: Dosing based on anticipated concentration < 20 mg/L due to renal impairment/insufficiency  Day of Therapy: 2  Additional Antimicrobials: pip-tazo, azithro    Pertinent Laboratory Values: Wt Readings from Last 1 Encounters:   08/08/22 106.6 kg (235 lb)     Temp Readings from Last 1 Encounters:   08/09/22 98.5 °F (36.9 °C)     No components found for: PROCAL  Estimated Creatinine Clearance: 31 mL/min (A) (based on SCr of 2.01 mg/dL (H)).   Recent Labs     08/09/22  0057 08/08/22  0148   WBC 11.3 10.3     Pertinent Cultures:  Culture Date Source Results   8/8 blood NGTD   MRSA Nasal Swab: awaiting results    Assessment:  Date/Time Current Dose Concentration (mg/L) Timing of Concentration (h) AUC   8/8 2,000 mg x1 - - -   8/9 1,250 mg x1 - - -   Note: Serum concentrations collected for AUC dosing may appear elevated if collected in close proximity to the dose administered, this is not necessarily an indication of toxicity    Plan:  Stage III CKD - dose by level at this time  Dose: 1,250 mg x1  Ordered a level for 8/10 with AM labs  Pharmacy will continue to monitor patient and adjust therapy as indicated    Thank you for the consult,  CHRISTINA Kumari  8/9/2022

## 2022-08-09 NOTE — PROGRESS NOTES
PT orders received and chart reviewed. PT johnieal attempted at 1421,  pt reporting ambulating independently with no questions or concerns regarding mobility at this time. Reports having all equipment at home with supportive . Declined need for skilled PT while admitted. Discussed if change in mobility to inform physician for new PT order. Will sign off.     Thank you for this referral  Brandy Valdez  PT DPT

## 2022-08-09 NOTE — PROGRESS NOTES
Reason for Admission:   COPD (chronic obstructive pulmonary disease) ([facility] [address] [J44.9]  Hypoxia [R09.02]  COVID [U07.1]               RUR Score:     28%             Resources/supports as identified by patient/family:       Top Challenges facing patient (as identified by patient/family and CM): None, at this time. Finances/Medication cost?     Patient has Medicare and Medicaid. Transportation      Patient's  transports patient. Support system or lack thereof? Patient has family support. Living arrangements? Patient lives with her . Self-care/ADLs/Cognition? Self-care        Current Advanced Directive/Advance Care Plan:   no.     Healthcare Decision Maker:   Primary Decision Maker: Concepcion Zachery - [phone]    Click here to complete [address] including selection of the Healthcare Decision Maker Relationship (ie \"Primary\")                          Plan for utilizing home health:    no, not at this time. Patient said she is self-care and ambulatory. Likelihood of readmission:   HIGH    Transition of Care Plan:                    Initial assessment completed with patient. Cognitive status of patient: oriented to time, place, person and situation. Face sheet information confirmed:  yes. The patient designates her  Murphy Starkey [phone] to participate in her discharge plan and to receive any needed information. This patient lives in a single family home with her . , with 4 steps to enter. Patient is able to navigate steps as needed. Prior to hospitalization, patient was considered to be independent with ADLs/IADLS : yes . Patient has a current ACP document on file: no. The patient's  will be available to transport patient home upon discharge.        The patient already has Cane, Shower chair, BSC, Grab bars, Rolling Walker (Patient said she does not use any of these DMEs', only if needed) ,  medical equipment available in the home. Patient is not currently active with home health. Patient has not stayed in a skilled nursing facility or rehab. This patient is on dialysis :no.        Currently, the discharge plan is Home with Family Assistance. The patient states that she can obtain her medications from the pharmacy, and take her medications as directed. Patient's current insurance is [facility] and [place] [id number]. Care Management Interventions  PCP Verified by CM:  Yes  Mode of Transport at Discharge: Self (Patient's  will be transporting patient home at time of discharge. )  Transition of Care Consult (CM Consult): Discharge Planning  Discharge Durable Medical Equipment: No  Physical Therapy Consult: Yes  Occupational Therapy Consult: Yes  Speech Therapy Consult: No  Support Systems: Spouse/Significant Other (Patient lives with her . )  Confirm Follow Up Transport: Family  Discharge Location  Patient Expects to be Discharged to[de-identified] Home with family assistance        Yanci Ramirez RN  Case Management [phone]        Readmission Assessment  Number of days since last admission?: 8-30 days  Previous disposition: Home with Family  Who is being interviewed?: Patient  What was the patient's/caregiver's perception as to why they think they needed to return back to the hospital?: Other (Comment) (Patient came to ED for Shortness of Breath.)  Did you visit your Primary Care Physician after you left the hospital, before you returned this time?: No  Why weren't you able to visit your PCP?: Other (Comment) (Patient was traveling to [facility], then hospitaled in [facility].)  Did you see a specialist, such as Cardiac, Pulmonary, Orthopedic Physician, etc. after you left the hospital?: No  Who advised the patient to return to the hospital?: Self-referral  Does the patient report anything that got in the way of taking their medications?: No  In our efforts to provide the best possible care to you and others like you, can you think of anything that we could have done to help you after you left the hospital the first time, so that you might not have needed to return so soon?: Other (Comment) (Nothing.  Patient came to ED for shortness of breath.)

## 2022-08-09 NOTE — PROGRESS NOTES
Problem: Self Care Deficits Care Plan (Adult)  Goal: *Acute Goals and Plan of Care (Insert Text)  Outcome: Resolved/Met   OCCUPATIONAL THERAPY EVALUATION/DISCHARGE    Patient: Claudine Aparicio (48 y.o. female)  Date: 8/9/2022  Primary Diagnosis: COPD (chronic obstructive pulmonary disease) ([facility] [J44.9]  Hypoxia [R09.02]  COVID [U07.1]       Precautions: Other (comment) (droplet +)  PLOF: Pt reports MI with self care    ASSESSMENT AND RECOMMENDATIONS:  Based on the objective data described below, the patient presents with generally decreased BUE strength, decreased functional endurance and new dependence on O2. Pt currently with 3 L O2 with nasal canula with O2 sats from 89-94% throughout session, RR 20, and HR 88-9 BPM. Pt Bp in seated position 107/34. Pt does not complain of SOB, dizziness. Pt semi reclined in bed and agreeable to OT session. Pt performed supine>sit with MI and donned socks with MI with good ability to reach B feet. Pt performed bed>BSC transfer with SUP and performed bladder hygiene and clothing management with MI. Pt returned to bedside SUP and then supine MI. Pt educated on use of BSC when able with assistance from staff to increase time OOB. Pt states she ha jose given diuretic that causes her to urinate often and she feels more comfortable with pure wick placed. Purewick replaced. Pt educated on the importance of BUE AROM in bed to maintain strength. Pt stating she feels she does not need OT services at this time. Pt encouraged to inform RN or physician if decrease in functional performance. Skilled occupational therapy is not indicated at this time. Further Equipment Recommendations for Discharge: N/A    AMPAC: At this time and based on an AM-PAC score of 22/24, no further OT is recommended upon discharge due to pt performing functional activities at MI or SUP and pt declined OT services. Recommend patient returns to prior setting with prior services.      This AMPAC score should be considered in conjunction with interdisciplinary team recommendations to determine the most appropriate discharge setting. Patient's social support, diagnosis, medical stability, and prior level of function should also be taken into consideration. SUBJECTIVE:   Patient stated I think I can do it.     OBJECTIVE DATA SUMMARY:     Past Medical History:   Diagnosis Date    Abnormal WBC count 5/17/2016    Anemia     Bilateral shoulder pain 9/27/2016    Chondromalacia of both patellae     COPD (chronic obstructive pulmonary disease) ([facility] 9/2015    mild-mod dz; Dr Nir Stuart    Diabetes [facility]) 2013    Diabetic eye exam ([facility] 2016    Dilated cardiomyopathy ([facility]     Dyslipidemia     Gout     Heart attack ([facility] 10/18/2019    History of echocardiogram 11/14/2014    Mild LVE. EF 40%. Mild, diffuse hypk. Mild LAE. Mild MR. Hypercholesteremia 1/08/14    Hypertension 2000    Noncompliance with medications 2/16/2016    Obesity     Orthostatic hypotension 5/17/2016    Osteoarthritis of both knees     Pain management 04/01/2016    Dr. Teetee Champion     Popliteal cyst, bilateral      Stage 3b chronic kidney disease ([facility] 8/8/2022    Vitamin D deficiency 10/16/14     Past Surgical History:   Procedure Laterality Date    HX HEART CATHETERIZATION      HX TUBAL LIGATION       Barriers to Learning/Limitations: None  Compensate with: visual, verbal, tactile, kinesthetic cues/model    Home Situation:   Home Situation  Home Environment: Private residence  One/Two Story Residence: One story  Living Alone: No  Support Systems: Spouse/Significant Other  Patient Expects to be Discharged to[de-identified] Home  Current DME Used/Available at Home: Cane, straight, Commode, bedside, Walker, rolling, Shower chair  Tub or Shower Type: Tub/Shower combination  [x]     Right hand dominant   []     Left hand dominant    Cognitive/Behavioral Status:  Neurologic State: Alert  Orientation Level: Oriented X4  Cognition: Appropriate decision making; Appropriate for age attention/concentration; Follows commands  Safety/Judgement: Fall prevention;Home safety    Skin: intact  Edema: none noted BUE    Vision/Perceptual:       intact     Coordination: BUE  Coordination: Within functional limits  Fine Motor Skills-Upper: Left Intact; Right Intact    Gross Motor Skills-Upper: Left Intact; Right Intact  Balance:  Sitting: Intact  Standing: Intact  Strength: BUE  Strength: Generally decreased, functional   Tone & Sensation: BUE    Tone: Normal  Sensation: Intact   Range of Motion: BUE    AROM: Within functional limits  PROM: Within functional limits    Functional Mobility and Transfers for ADLs:  Bed Mobility:  Supine to Sit: Modified independent  Sit to Supine: Modified independent  Transfers:  Sit to Stand: Supervision  Stand to Sit: Supervision    Toilet Transfer : Supervision    ADL Assessment:   Toileting: Modified independent   ADL Intervention:   Lower Body Dressing Assistance  Socks: Modified independent  Toileting  Bladder Hygiene: Modified independent  Clothing Management: Modified independent  Cognitive Retraining  Safety/Judgement: Fall prevention;Home safety    Pain:  Pain level pre-treatment: 0/10   Pain level post-treatment: 0/10   Pain Intervention(s): Medication (see MAR); Rest, Ice, Repositioning   Response to intervention: Nurse notified, See doc flow    Activity Tolerance:   Fair +  Please refer to the flowsheet for vital signs taken during this treatment. After treatment:   []  Patient left in no apparent distress sitting up in chair  [x]  Patient left in no apparent distress in bed  [x]  Call bell left within reach  [x]  Nursing notified  []  Caregiver present  []  Bed alarm activated    COMMUNICATION/EDUCATION:   [x]      Role of Occupational Therapy in the acute care setting  [x]      Home safety education was provided and the patient/caregiver indicated understanding.   [x]      Patient/family have participated as able and agree with findings and recommendations. []      Patient is unable to participate in plan of care at this time. Thank you for this referral.  Ritu Dumont OT  Time Calculation: 24 mins      Eval Complexity: History: LOW Complexity : Brief history review ; Examination: LOW Complexity : 1-3 performance deficits relating to physical, cognitive , or psychosocial skils that result in activity limitations and / or participation restrictions ; Decision Making:MEDIUM Complexity : Patient may present with comorbidities that affect occupational performnce. Miniml to moderate modification of tasks or assistance (eg, physical or verbal ) with assesment(s) is necessary to enable patient to complete evaluation     [facility] AM-PAC® Daily Activity Inpatient Short Form (6-Clicks)*    How much HELP from another person does the patient currently need    (If the patient hasn't done an activity recently, how much help from another person do you think he/she would need if he/she tried?)   Total (Total A or Dep)   A Lot  (Mod to Max A)   A Little (Sup or Min A)   None (Mod I to I)   Putting on and taking off regular lower body clothing? [] 1 [] 2 [] 3 [x] 4   2. Bathing (including washing, rinsing,      drying)? [] 1 [] 2 [x] 3 [] 4   3. Toileting, which includes using toilet, bedpan or urinal?   [] 1 [] 2 [x] 3 [] 4   4. Putting on and taking off regular upper body clothing? [] 1 [] 2 [] 3 [x] 4   5. Taking care of personal grooming such as brushing teeth? [] 1 [] 2 [] 3 [x] 4   6. Eating meals?    [] 1 [] 2 [] 3 [x] 4

## 2022-08-09 NOTE — DIABETES MGMT
Diabetes/ Glycemic Control Plan of Care  Recommendations:   Blood glucose this am 174 mg/dl  Recommend increasing Lantus dose to 20 units every bedtime. Continue corrective insulin coverage as needed  Will advance to very insulin resistant dosing. Will continue inpatient monitoring. Assessment:   DX:   1. Acute respiratory failure with hypoxia ([address]        2. COVID-19        3. History of chronic obstructive lung disease           Fasting/ Morning blood glucose:   Lab Results   Component Value Date/Time    Glucose 146 (H) 08/09/2022 12:57 AM    Glucose (POC) 172 (H) 08/09/2022 07:50 AM     IV Fluids containing dextrose:  none   Steroids:   Rx Glucocorticoids (24h ago, onward)       Start     Dose Route Frequency Ordered Stop    08/08/22 1643  methylPREDNISolone (PF) (SOLU-MEDROL) injection 40 mg         40 mg IV EVERY 8 HOURS 08/08/22 1642 --              Blood glucose values:        Latest Reference Range & Units 7/11/22 12:19 8/8/22 12:02 8/8/22 16:55 8/8/22 21:54 8/9/22 07:50   GLUCOSE,FAST - POC 70 - 110 mg/dL 128 (H) 202 (H) 225 (H) 165 (H) 172 (H)   (H): Data is abnormally high  Within target range (70-180mg/dL):  Progressing  Current insulin orders:   Lantus 15 units every bedtime. Lispro corrective insulin coverage AC&HS as needed. Total Daily Dose previous 24 hours = 21 units   Current A1c:   Lab Results   Component Value Date/Time    Hemoglobin A1c 7.2 (H) 06/29/2022 03:12 AM      equivalent  to ave Blood Glucose of 160 mg/dl for 2-3 months prior to admission  Adequate glycemic control PTA:   yes   Nutrition/Diet:   Active Orders   Diet    ADULT DIET Regular; 4 carb choices (60 gm/meal)      Meal Intake:  No data found. Supplement Intake:  No data found.     Home diabetes medications:   Key Antihyperglycemic Medications               insulin detemir (LEVEMIR) 100 unit/mL injection 15 units daily for diabetes          Plan/Goals:   Blood glucose will be within target of 70 - 180 mg/dl within 72 hours  Reinforce dietary and medication compliance at home. Education:  [] Refer to Diabetes Education Record                       [x] Education not indicated at this time   Covid-19 positive isolation. Attempted to call pts room to review home meds, no answer.     Kelsi Loera

## 2022-08-09 NOTE — CONSULTS
Infectious Disease Consultation Note        Reason: COVID infection    Current abx Prior abx   ANCA mycin, PIP Jacques, azithromycin      Lines: PIV      Assessment :    81year-old female with history of CAD s/p 2 MI in the past s/p stent in 2020, poorly controlled diabetes mellitus type 2, morbid obesity, hypertension, COPD, CKD 3 was admitted due to worsening shortness of breath:    1. COVID-19 pneumonia: Notes for 7/27 in [facility], still positive here. Was treated with steroids, IV antibiotics for 7 days in [place]-discharged last Saturday and drove home the next day  -Has received 2 pills every 12 hours x5 days by her physician last week- > which is consistent with Paxlovid  -Fully vaccinated and boosted  -CTA chest without PE, but shows bilateral COVID related opacities, pulmonary edema  -Mild exertional dyspnea- >95% sats on 3 L of oxygen per nasal cannula  -Afebrile, normal WBC, CKD at baseline, heart failure with proBNP of 1871, normal troponins, normal procalcitonin, normal CRP, elevated LDH  -Blood cultures negative at 24 hours  -Patient has been started on bank, Zosyn, azithromycin and steroids    2. Acute congestive heart failure  -NT proBNP in the setting of COVID respiratory infection and recent steroid use  -Chronic CAD  -Lateral lower extremity edema, urinary congestion on imaging    3. DM  4.  CKD      Lab Results   Component Value Date/Time    Hemoglobin A1c 7.2 (H) 06/29/2022 03:12 AM         Recommendations:    Stop all antibiotics with a negative procalcitonin and recent 7 days of antibiotics at outside facility in [facility]    Also recommend stopping prednisone as no significant oxygen requirements and negative inflammatory markers-steroids will unnecessarily increase fluid retention, hyperglycemia, etc.  Patient is outside the window of any other therapeutic or antiviral therapy-may have taken Paxlovid already    Recommend diuresis and other supportive care-for to primary team  Increasing mobility and titrating oxygen per nasal cannula as tolerated        Thank you for consultation request. Above plan was discussed in details with patient. Please call me if any further questions or concerns. Will continue to participate in the care of this patient. HPI:  81year-old female with history of CAD s/p 2 MI in the past s/p stent in 2020, poorly controlled diabetes mellitus type 2, morbid obesity, hypertension, COPD, CKD 3 was admitted due to worsening shortness of breath. Patient reports dabbling from [facility] last Friday with her  via car. She was in the hospital while visiting their for about a week where she was diagnosed with COVID-19 and was given steroids and antibiotics IV during her stay. After she got home last Sunday, she called her primary care doctor who prescribed her a medication that she took for 5 days 2 tablets twice a day (consistent with Paxlovid, but pt is not sure of the name). She took those medications and finished the course the day before coming to the hospital.  Upon admission, patient was afebrile, hemodynamically stable with mild hypoxemia requiring nasal cannula oxygen. PCR SARS-CoV-2 was positive. CTA chest did not show a PE but bilateral opacities were consistent with COVID-19 pneumonia. Her proBNP is elevated and she seems to be in heart failure. Patient's procalcitonin has been negative and blood cultures were also drawn. He was started on broad-spectrum antibiotics and 40 mg of prednisone daily. Denies any fevers or chills. No nausea, vomiting or diarrhea reported. No dysuria reported. She does not have any more of the body aches and pains, however, she does have ongoing congestion and some lingering dry cough. Overall, she feels fatigued, tired and shortness of breath with exertion. Her appetite is diminished. Her oxygen saturations are greater than 95% on 3 L of oxygen per nasal cannula currently, he is comfortable.   Additionally patient has been fully vaccinated and boosted twice    Past Medical History:   Diagnosis Date    Abnormal WBC count 5/17/2016    Anemia     Bilateral shoulder pain 9/27/2016    Chondromalacia of both patellae     COPD (chronic obstructive pulmonary disease) ([facility] 9/2015    mild-mod dz; Dr Nir Stuart    Diabetes [facility]) 2013    [facility] ([facility] 2016    Dilated cardiomyopathy ([facility]     Dyslipidemia     Gout     Heart attack ([facility] 10/18/2019    History of echocardiogram 11/14/2014    Mild LVE. EF 40%. Mild, diffuse hypk. Mild LAE. Mild MR. Hypercholesteremia 1/08/14    Hypertension 2000    Noncompliance with medications 2/16/2016    Obesity     Orthostatic hypotension 5/17/2016    Osteoarthritis of both knees     Pain management 04/01/2016    Dr. Teetee Champion     Popliteal cyst, bilateral      Stage 3b chronic kidney disease ([facility] 8/8/2022    Vitamin D deficiency 10/16/14       Past Surgical History:   Procedure Laterality Date    HX HEART CATHETERIZATION      HX TUBAL LIGATION         Current Discharge Medication List        CONTINUE these medications which have NOT CHANGED    Details   hydrALAZINE (APRESOLINE) 100 mg tablet Take 100 mg by mouth three (3) times daily. gabapentin (NEURONTIN) 400 mg capsule Take 400 mg by mouth in the morning. tiZANidine (ZANAFLEX) 4 mg tablet Take 4 mg by mouth two (2) times daily as needed for Muscle Spasm(s). calcitRIOL (ROCALTROL) 0.25 mcg capsule Take 1 Cap by mouth daily. Qty: 30 Cap, Refills: 0      umeclidinium-vilanterol (ANORO ELLIPTA) 62.5-25 mcg/actuation inhaler Take 1 Puff by inhalation daily. For COPD  Qty: 3 Inhaler, Refills: 3    Comments: 9/22/15 Dr Sandy Alvarenga prescribed.  Will order via [facility] if available  Associated Diagnoses: Chronic obstructive pulmonary disease, unspecified COPD type (HCC)      dexAMETHasone (DECADRON) 0.5 mg tablet TAKE 2 TABLET BY MOUTH (1MG TOTAL) EVERY 12 HOURS FOR 5 DAYS      furosemide (LASIX) 20 mg tablet Take 1 Tablet by mouth two (2) times a day. Qty: 60 Tablet, Refills: 0      albuterol-ipratropium (DUO-NEB) 2.5 mg-0.5 mg/3 ml nebu 3 mL by Nebulization route every six (6) hours as needed for Wheezing. Qty: 30 Nebule, Refills: 0      atorvastatin (LIPITOR) 80 mg tablet Take 1 Tab by mouth daily. Qty: 90 Tab, Refills: 3    Associated Diagnoses: Dyslipidemia, goal LDL below 70      carvediloL (COREG) 25 mg tablet Take 1 Tab by mouth two (2) times daily (with meals). Qty: 180 Tab, Refills: 3    Associated Diagnoses: Essential hypertension; Chronic systolic congestive heart failure (HCC)      aspirin 81 mg chewable tablet Take 2 Tabs by mouth daily.   Qty: 60 Tab, Refills: 0    Associated Diagnoses: Coronary artery disease involving native coronary artery of native heart without angina pectoris      insulin detemir (LEVEMIR) 100 unit/mL injection 15 units daily for diabetes  Qty: 2 Vial, Refills: 0    Associated Diagnoses: Diabetes mellitus type 2, insulin dependent (HCC)             Current Facility-Administered Medications   Medication Dose Route Frequency    [START ON 8/10/2022] famotidine (PEPCID) tablet 20 mg  20 mg Oral DAILY    dextrose 10% infusion 0-250 mL  0-250 mL IntraVENous PRN    insulin lispro (HUMALOG) injection   SubCUTAneous AC&HS    glucose chewable tablet 16 g  4 Tablet Oral PRN    glucagon (GLUCAGEN) injection 1 mg  1 mg IntraMUSCular PRN    budesonide (PULMICORT) 500 mcg/2 ml nebulizer suspension  500 mcg Nebulization BID RT    And    arformoteroL (BROVANA) neb solution 15 mcg  15 mcg Nebulization BID RT    albuterol-ipratropium (DUO-NEB) 2.5 MG-0.5 MG/3 ML  3 mL Nebulization Q6H RT    aspirin chewable tablet 162 mg  162 mg Oral DAILY    atorvastatin (LIPITOR) tablet 80 mg  80 mg Oral DAILY    carvediloL (COREG) tablet 25 mg  25 mg Oral BID WITH MEALS    furosemide (LASIX) injection 40 mg  40 mg IntraVENous BID    hydrALAZINE (APRESOLINE) tablet 100 mg  100 mg Oral TID    methylPREDNISolone (PF) (SOLU-MEDROL) injection 40 mg  40 mg IntraVENous Q8H    guaiFENesin ER (MUCINEX) tablet 600 mg  600 mg Oral Q12H    gabapentin (NEURONTIN) capsule 400 mg  400 mg Oral DAILY    heparin (porcine) injection 5,000 Units  5,000 Units SubCUTAneous Q8H    insulin glargine (LANTUS) injection 15 Units  15 Units SubCUTAneous QHS    melatonin (rapid dissolve) tablet 5 mg  5 mg Oral QHS PRN    albuterol-ipratropium (DUO-NEB) 2.5 MG-0.5 MG/3 ML  3 mL Nebulization Q6H PRN    ondansetron (ZOFRAN) injection 4 mg  4 mg IntraVENous Q6H PRN    acetaminophen (TYLENOL) tablet 650 mg  650 mg Oral Q6H PRN       Allergies: Patient has no known allergies.     Family History   Problem Relation Age of Onset    Diabetes Mother     Hypertension Mother     Hypertension Father     Kidney Disease Maternal Aunt 48        Dialysis     Social History     Socioeconomic History    Marital status:      Spouse name: Not on file    Number of children: 3    Years of education: Not on file    Highest education level: Not on file   Occupational History    Occupation: retired   Tobacco Use    Smoking status: Former     Packs/day: 0.25     Years: 48.00     Pack years: 12.00     Types: Cigarettes     Quit date: 2018     Years since quittin.2    Smokeless tobacco: Never   Substance and Sexual Activity    Alcohol use: No     Alcohol/week: 0.0 standard drinks    Drug use: No    Sexual activity: Yes     Partners: Male   Other Topics Concern     Service No    Blood Transfusions No    Caffeine Concern No    Occupational Exposure No    Hobby Hazards No    Sleep Concern No    Stress Concern No    Weight Concern No    Special Diet No    Back Care No    Exercise No    Bike Helmet No    Seat Belt Yes    Self-Exams Yes   Social History Narrative    Not on file     Social Determinants of Health     Financial Resource Strain: Not on file   Food Insecurity: Not on file   Transportation Needs: Not on file   Physical Activity: Not on file   Stress: Not on file   Social Connections: Not on file   Intimate Partner Violence: Not on file   Housing Stability: Not on file     Social History     Tobacco Use   Smoking Status Former    Packs/day: 0.25    Years: 48.00    Pack years: 12.00    Types: Cigarettes    Quit date: 2018    Years since quittin.2   Smokeless Tobacco Never        Temp (24hrs), Av.3 °F (36.8 °C), Min:97.9 °F (36.6 °C), Max:98.7 °F (37.1 °C)    Visit Vitals  BP (!) 150/65   Pulse 81   Temp 98.5 °F (36.9 °C)   Resp 20   Ht 5' 4\" (1.626 m)   Wt 106.6 kg (235 lb)   SpO2 96%   BMI 40.34 kg/m²       ROS: 12 point ROS obtained in details. Pertinent positives as mentioned in HPI,   otherwise negative    Physical Exam:    General: Well developed, obese female laying on the bed/sitting on the  bed AAOx3 in no acute distress. General:   awake alert and oriented   HEENT:  Normocephalic, atraumatic, PERRL, EOMI, nose congested, nasal and oral mucous are moist and without evidence of lesions. No thrush. Neck supple, no bruits. Lymph Nodes:   no cervical adenopathy   Lungs:   non-labored, bilaterally clear to auscultation- no crackles wheezes rales or rhonchi   Heart:  RRR, s1 and s2; no murmurs rubs or gallops, no edema, + pedal pulses   Abdomen:  soft, non-distended, active bowel sounds, Non-tender   Genitourinary:  deferred   Extremities:    no joint effusions-bilateral lower extremity edema present; Full ROM of all large joints to the upper and lower extremities; muscle mass appropriate for age   Neurologic:  No gross focal sensory abnormalities; 5/5 muscle strength to upper and lower extremities. Speech appropriate.  Cranial nerves intact                        Skin:  No rash or ulcers noted   Wound:       Back: Not examined   Psychiatric:  No suicidal or homicidal ideations, appropriate mood and affect         Labs: Results:   Chemistry Recent Labs     22  0057 22  0148   * 136*    142   K 5.4 5.4    112*   CO2 24 23   BUN 44* 37* CREA 2.01* 1.67*   CA 8.9 9.0   AGAP 7 7   BUCR 22* 22*   AP  --  173*   TP  --  6.1*   ALB  --  3.2*   GLOB  --  2.9   AGRAT  --  1.1      CBC w/Diff Recent Labs     08/09/22  0057 08/08/22  0148   WBC 11.3 10.3   RBC 3.49* 3.17*   HGB 9.5* 8.9*   HCT 31.3* 29.2*    260   GRANS 89* 64   LYMPH 5* 22   EOS 0 0      Microbiology Recent Labs     08/08/22  0535 08/08/22  0520   CULT NO GROWTH 1 DAY NO GROWTH 1 DAY          RADIOLOGY:    All available imaging studies/reports in [facility] care for this admission were reviewed      Disclaimer: Sections of this note are dictated utilizing voice recognition software, which may have resulted in some phonetic based errors in grammar and contents. Even though attempts were made to correct all the mistakes, some may have been missed, and remained in the body of the document. If questions arise, please contact our department.     Dr. Maribel Thorpe, Infectious Disease Specialist    August 9, 2022  3:25 PM

## 2022-08-09 NOTE — ROUTINE PROCESS
Received bedside report from [facility], report included SBAR, MAR, and Kardex. Patient recently arrived on the floor, HOB elevated, bed in lowest position and oriented to call button. Unable to complete required documentation, patient is going to ask  to bring medications so the nurse can complete the PTA med list.    Gave bedside report to Carlos Meek RN, report included SBAR, MAR, and Kardex.

## 2022-08-09 NOTE — PROGRESS NOTES
0715-  Bedside and Verbal shift change report given to Tru Akhtar RN (oncoming nurse) by Sherie Navarro RN (offgoing nurse). Report included the following information SBAR, Kardex, Intake/Output, MAR, and Recent Results. 1045-  Rapid Covid test sent to lab. 1915-  Bedside and Verbal shift change report given to Sherie Navarro RN (oncoming nurse) by Tru Akhtar RN (offgoing nurse). Report included the following information SBAR, Kardex, Intake/Output, MAR, and Recent Results.          Wound Prevention Checklist    Patient: Lesvia Lugo (34 y.o. female)  Date: 8/9/2022  Diagnosis: COPD (chronic obstructive pulmonary disease) ([facility] [J44.9]  Hypoxia [R09.02]  COVID [U07.1] Acute respiratory failure with hypoxia ([facility]    Precautions:         []  Heel prevention boots placed on patient    [x]  Patient turned q2h during shift    []  Lift team ordered    [x]  Patient on Kelly bed/Specialty bed    []  Each Wound is documented during shift (Stage, Color, drainage, odor, measurements, and dressings)    [x]  Dual skin checks done at bedside during shift report with Shawnee Tam RN

## 2022-08-09 NOTE — PROGRESS NOTES
conducted an initial consultation and Spiritual Assessment for Scot Ray, who is a 81 y.o.,female. Patients Primary Language is: [place]. According to the patients EMR Pent[employer]ism Affiliation is: No preference. The reason the Patient came to the hospital is:   Patient Active Problem List    Diagnosis Date Noted    Pneumonia due to COVID-19 virus 08/08/2022    Acute on chronic diastolic heart failure ([address] 08/08/2022    Stage 3b chronic kidney disease ([address] 08/08/2022    Pulmonary hypertension ([address] 07/08/2022    Acute exacerbation of CHF (congestive heart failure) ([address] 07/08/2022    History of myocardial infarction 06/27/2022    Acute hyperkalemia 06/26/2022    Breast cancer ([address] 06/26/2022    Rectal cancer ([address] 06/26/2022    Hyperkalemia 03/07/2022    Stage 1 acute kidney injury ([address] 12/18/2021    Hypotension 03/19/2021    Sepsis ([address] 03/18/2021    Nonrheumatic mitral valve regurgitation 08/13/2020    Chronic systolic congestive heart failure ([address] 11/07/2019    Pulmonary edema cardiac cause ([address] 10/19/2019    COPD with acute exacerbation ([address] 10/19/2019    Respiratory failure requiring intubation ([address] 10/19/2019    Syncope 10/18/2019    Flash pulmonary edema ([address] 10/18/2019    Elevated d-dimer 10/18/2019    Acute respiratory failure with hypoxia ([address] 10/18/2019    CAD (coronary artery disease) 10/18/2019    Ischemic cardiomyopathy 09/09/2019    Combined systolic and diastolic congestive heart failure ([address] 08/26/2019    Type 2 diabetes mellitus with hyperglycemia ([address] 08/26/2019    TESSA on CPAP 08/26/2019    COPD exacerbation ([address] 08/25/2019    Type 2 diabetes with nephropathy ([address] 07/12/2018    Severe obesity (BMI 35.0-39. 9) with comorbidity ([address] 07/12/2018    SOB (shortness of breath) 06/09/2018    Coronary artery disease involving native coronary artery with unstable angina pectoris ([facility] 06/09/2018    Pulmonary edema 06/08/2018    Coronary artery disease involving native coronary artery of native heart without angina pectoris 05/31/2018    STEMI (ST elevation myocardial infarction) ([facility] 05/20/2018    Acute pulmonary edema ([facility] 05/20/2018    Caregiver stress 04/26/2017    Bilateral shoulder pain 09/27/2016    Elevated alkaline phosphatase level 09/27/2016    Chronic obstructive pulmonary disease ([facility] 05/17/2016    Compliance with medication regimen 05/17/2016    Chronic pain of both knees 04/13/2016    Chronic pain syndrome 04/13/2016    Primary osteoarthritis of both knees 04/13/2016    Iron deficiency anemia 10/22/2015    Decreased GFR 10/22/2015    Dyslipidemia, goal LDL below 70 10/22/2015    Former smoker 10/22/2015    Osteoarthritis of both knees     Essential hypertension 09/17/2015    Chronic obstructive pulmonary disease with acute exacerbation ([facility] 09/01/2015    Diabetes mellitus type 2, insulin dependent ([facility] 07/02/2015    Environmental allergies 04/01/2015    Cardiomyopathy, dilated ([facility] 12/31/2014        The  provided the following Interventions:  Initiated a relationship of care and support. Chart reviewed. The following outcomes where achieved:  Patient is not interested at this time in completing an [employer] Directive. Plan:  Chaplains will continue to follow and will provide pastoral care on an as needed/requested basis.  recommends bedside caregivers page  on duty if patient shows signs of acute spiritual or emotional distress.     [address]  ([phone])

## 2022-08-09 NOTE — PROGRESS NOTES
Problem: Gas Exchange - Impaired  Goal: Absence of hypoxia  Outcome: Progressing Towards Goal  Goal: Promote optimal lung function  Outcome: Progressing Towards Goal     Problem: Isolation Precautions - Risk of Spread of Infection  Goal: Prevent transmission of infectious organism to others  Outcome: Progressing Towards Goal     Problem: Falls - Risk of  Goal: *Absence of Falls  Description: Document Luis Miguel Fall Risk and appropriate interventions in the flowsheet.   Outcome: Progressing Towards Goal  Note: Fall Risk Interventions:  Mobility Interventions: Patient to call before getting OOB, Bed/chair exit alarm         Medication Interventions: Bed/chair exit alarm, Teach patient to arise slowly, Patient to call before getting OOB

## 2022-08-09 NOTE — PROGRESS NOTES
Progress Note  Hospitalist Service    Patient: Yazmin Calderon MRN: [id number]   SSN: [id number]  YOB: 1950   Age: 81 y.o. Sex: female      Admit Date: 8/8/2022    LOS: 1 day   Chief Complaint   Patient presents with    Shortness of Breath       Subjective:     Patient feels improved this morning. She is now on room air. No cough, chest pain. No changes to swelling. Objective:     Vitals:  Visit Vitals  BP (!) 150/65   Pulse 81   Temp 98.5 °F (36.9 °C)   Resp 20   Ht 5' 4\" (1.626 m)   Wt 106.6 kg (235 lb)   SpO2 (S) 96%   BMI 40.34 kg/m²       Physical Exam:   General appearance: alert, cooperative, no distress, appears stated age  Lungs: rhonchi R base, L base, diminished breath sounds R base, L base  Heart: regular rate and rhythm, S1, S2 normal, no murmur, click, rub or gallop  Abdomen: soft, non-tender. Bowel sounds normal. No masses,  no organomegaly  Pulses: 2+ and symmetric  Skin: Skin color, texture, turgor normal. No rashes or lesions  Neuro:  normal without focal findings  mental status, speech normal, alert and oriented x iii  PHYLICIA  reflexes normal and symmetric    Intake and Output:  Current Shift: No intake/output data recorded.   Last three shifts: 08/07 1901 - 08/09 0700  In: -   Out: 3050 [Urine:3050]    Lab/Data Review:  Recent Results (from the past 12 hour(s))   CBC WITH AUTOMATED DIFF    Collection Time: 08/09/22 12:57 AM   Result Value Ref Range    WBC 11.3 4.6 - 13.2 K/uL    RBC 3.49 (L) 4.20 - 5.30 M/uL    HGB 9.5 (L) 12.0 - 16.0 g/dL    HCT 31.3 (L) 35.0 - 45.0 %    MCV 89.7 78.0 - 100.0 FL    MCH 27.2 24.0 - 34.0 PG    MCHC 30.4 (L) 31.0 - 37.0 g/dL    RDW 19.2 (H) 11.6 - 14.5 %    PLATELET 719 595 - 905 K/uL    MPV 10.3 9.2 - 11.8 FL    NRBC 2.1 (H) 0  WBC    ABSOLUTE NRBC 0.24 (H) 0.00 - 0.01 K/uL    NEUTROPHILS 89 (H) 40 - 73 %    LYMPHOCYTES 5 (L) 21 - 52 %    MONOCYTES 3 3 - 10 %    EOSINOPHILS 0 0 - 5 %    BASOPHILS 0 0 - 2 %    IMMATURE GRANULOCYTES 3 (H) 0.0 - 0.5 %    ABS. NEUTROPHILS 10.1 (H) 1.8 - 8.0 K/UL    ABS. LYMPHOCYTES 0.6 (L) 0.9 - 3.6 K/UL    ABS. MONOCYTES 0.3 0.05 - 1.2 K/UL    ABS. EOSINOPHILS 0.0 0.0 - 0.4 K/UL    ABS. BASOPHILS 0.0 0.0 - 0.1 K/UL    ABS. IMM. GRANS. 0.3 (H) 0.00 - 0.04 K/UL    DF AUTOMATED     METABOLIC PANEL, BASIC    Collection Time: 08/09/22 12:57 AM   Result Value Ref Range    Sodium 139 136 - 145 mmol/L    Potassium 5.4 3.5 - 5.5 mmol/L    Chloride 108 100 - 111 mmol/L    CO2 24 21 - 32 mmol/L    Anion gap 7 3.0 - 18 mmol/L    Glucose 146 (H) 74 - 99 mg/dL    BUN 44 (H) 7.0 - 18 MG/DL    Creatinine 2.01 (H) 0.6 - 1.3 MG/DL    BUN/Creatinine ratio 22 (H) 12 - 20      GFR est AA 30 (L) >60 ml/min/1.73m2    GFR est non-AA 24 (L) >60 ml/min/1.73m2    Calcium 8.9 8.5 - 10.1 MG/DL   GLUCOSE, POC    Collection Time: 08/09/22  7:50 AM   Result Value Ref Range    Glucose (POC) 172 (H) 70 - 110 mg/dL         Key Findings or tests:       Telemetry NONE   Oxygen NONE     Assessment and Plan:     1) Acute hypoxic respiratory failure secondary to CHF with ground glass opacities due to COVID-19 pneumonia, COPD. 2) Acute on chronic CHF -   3) Pleural effusion due to CHF  4) COPD with mild exacerbation  5) recent COVID-19 pneumonia, possible bacterial infection superimposed  6) Type II DM with hyperglcyemia  7) Morbid obesity - BMP 40.34  8) CAD  9) HTN, essential  10) CKD stage III  11) normocytic anemia     Positive COVID since 7/27  S/p steroids, IV antibiotics in [place]  S/p Paxclovid   All antibiotics discontinued.   Continue Lasix     Probable discharge in next 24-48 hours      Yessenia Garza DO, hospitalist   August 9, 2022

## 2022-08-10 LAB
ANION GAP SERPL CALC-SCNC: 8 MMOL/L (ref 3–18)
ATRIAL RATE: 75 BPM
BACTERIA SPEC CULT: NORMAL
BACTERIA SPEC CULT: NORMAL
BASOPHILS # BLD: 0 K/UL (ref 0–0.1)
BASOPHILS NFR BLD: 0 % (ref 0–2)
BUN SERPL-MCNC: 64 MG/DL (ref 7–18)
BUN/CREAT SERPL: 25 (ref 12–20)
CALCIUM SERPL-MCNC: 8.6 MG/DL (ref 8.5–10.1)
CALCULATED P AXIS, ECG09: 67 DEGREES
CALCULATED R AXIS, ECG10: 22 DEGREES
CALCULATED T AXIS, ECG11: 102 DEGREES
CHLORIDE SERPL-SCNC: 107 MMOL/L (ref 100–111)
CO2 SERPL-SCNC: 23 MMOL/L (ref 21–32)
CREAT SERPL-MCNC: 2.56 MG/DL (ref 0.6–1.3)
DIAGNOSIS, 93000: NORMAL
DIFFERENTIAL METHOD BLD: ABNORMAL
EOSINOPHIL # BLD: 0 K/UL (ref 0–0.4)
EOSINOPHIL NFR BLD: 0 % (ref 0–5)
ERYTHROCYTE [DISTWIDTH] IN BLOOD BY AUTOMATED COUNT: 19.1 % (ref 11.6–14.5)
GLUCOSE BLD STRIP.AUTO-MCNC: 156 MG/DL (ref 70–110)
GLUCOSE BLD STRIP.AUTO-MCNC: 160 MG/DL (ref 70–110)
GLUCOSE BLD STRIP.AUTO-MCNC: 164 MG/DL (ref 70–110)
GLUCOSE BLD STRIP.AUTO-MCNC: 230 MG/DL (ref 70–110)
GLUCOSE SERPL-MCNC: 172 MG/DL (ref 74–99)
HCT VFR BLD AUTO: 29.2 % (ref 35–45)
HGB BLD-MCNC: 9 G/DL (ref 12–16)
IMM GRANULOCYTES # BLD AUTO: 0.4 K/UL (ref 0–0.04)
IMM GRANULOCYTES NFR BLD AUTO: 3 % (ref 0–0.5)
LYMPHOCYTES # BLD: 0.9 K/UL (ref 0.9–3.6)
LYMPHOCYTES NFR BLD: 7 % (ref 21–52)
MCH RBC QN AUTO: 28.1 PG (ref 24–34)
MCHC RBC AUTO-ENTMCNC: 30.8 G/DL (ref 31–37)
MCV RBC AUTO: 91.3 FL (ref 78–100)
MONOCYTES # BLD: 1.1 K/UL (ref 0.05–1.2)
MONOCYTES NFR BLD: 9 % (ref 3–10)
NEUTS SEG # BLD: 10.4 K/UL (ref 1.8–8)
NEUTS SEG NFR BLD: 81 % (ref 40–73)
NRBC # BLD: 0.29 K/UL (ref 0–0.01)
NRBC BLD-RTO: 2.3 PER 100 WBC
P-R INTERVAL, ECG05: 160 MS
PLATELET # BLD AUTO: 293 K/UL (ref 135–420)
PMV BLD AUTO: 9.9 FL (ref 9.2–11.8)
POTASSIUM SERPL-SCNC: 5.1 MMOL/L (ref 3.5–5.5)
Q-T INTERVAL, ECG07: 390 MS
QRS DURATION, ECG06: 78 MS
QTC CALCULATION (BEZET), ECG08: 435 MS
RBC # BLD AUTO: 3.2 M/UL (ref 4.2–5.3)
SERVICE CMNT-IMP: NORMAL
SODIUM SERPL-SCNC: 138 MMOL/L (ref 136–145)
TROPONIN-HIGH SENSITIVITY: 26 NG/L (ref 0–54)
VENTRICULAR RATE, ECG03: 75 BPM
WBC # BLD AUTO: 12.8 K/UL (ref 4.6–13.2)

## 2022-08-10 PROCEDURE — 93005 ELECTROCARDIOGRAM TRACING: CPT

## 2022-08-10 PROCEDURE — 2709999900 HC NON-CHARGEABLE SUPPLY

## 2022-08-10 PROCEDURE — 77010033678 HC OXYGEN DAILY

## 2022-08-10 PROCEDURE — 74011250636 HC RX REV CODE- 250/636: Performed by: PHYSICIAN ASSISTANT

## 2022-08-10 PROCEDURE — 74011250637 HC RX REV CODE- 250/637: Performed by: PHYSICIAN ASSISTANT

## 2022-08-10 PROCEDURE — 94762 N-INVAS EAR/PLS OXIMTRY CONT: CPT

## 2022-08-10 PROCEDURE — 65660000004 HC RM CVT STEPDOWN

## 2022-08-10 PROCEDURE — 74011636637 HC RX REV CODE- 636/637: Performed by: STUDENT IN AN ORGANIZED HEALTH CARE EDUCATION/TRAINING PROGRAM

## 2022-08-10 PROCEDURE — 74011250636 HC RX REV CODE- 250/636: Performed by: STUDENT IN AN ORGANIZED HEALTH CARE EDUCATION/TRAINING PROGRAM

## 2022-08-10 PROCEDURE — 74011250637 HC RX REV CODE- 250/637: Performed by: STUDENT IN AN ORGANIZED HEALTH CARE EDUCATION/TRAINING PROGRAM

## 2022-08-10 PROCEDURE — 94640 AIRWAY INHALATION TREATMENT: CPT

## 2022-08-10 PROCEDURE — 82962 GLUCOSE BLOOD TEST: CPT

## 2022-08-10 PROCEDURE — 80048 BASIC METABOLIC PNL TOTAL CA: CPT

## 2022-08-10 PROCEDURE — 99233 SBSQ HOSP IP/OBS HIGH 50: CPT | Performed by: STUDENT IN AN ORGANIZED HEALTH CARE EDUCATION/TRAINING PROGRAM

## 2022-08-10 PROCEDURE — 74011000250 HC RX REV CODE- 250: Performed by: PHYSICIAN ASSISTANT

## 2022-08-10 PROCEDURE — 36415 COLL VENOUS BLD VENIPUNCTURE: CPT

## 2022-08-10 PROCEDURE — 99223 1ST HOSP IP/OBS HIGH 75: CPT | Performed by: INTERNAL MEDICINE

## 2022-08-10 PROCEDURE — 85025 COMPLETE CBC W/AUTO DIFF WBC: CPT

## 2022-08-10 PROCEDURE — 74011636637 HC RX REV CODE- 636/637: Performed by: PHYSICIAN ASSISTANT

## 2022-08-10 PROCEDURE — 84484 ASSAY OF TROPONIN QUANT: CPT

## 2022-08-10 PROCEDURE — 74011250637 HC RX REV CODE- 250/637: Performed by: HOSPITALIST

## 2022-08-10 RX ORDER — MORPHINE SULFATE 2 MG/ML
1 INJECTION, SOLUTION INTRAMUSCULAR; INTRAVENOUS ONCE
Status: COMPLETED | OUTPATIENT
Start: 2022-08-10 | End: 2022-08-10

## 2022-08-10 RX ORDER — HEPARIN SODIUM 10000 [USP'U]/100ML
9-25 INJECTION, SOLUTION INTRAVENOUS
Status: DISCONTINUED | OUTPATIENT
Start: 2022-08-10 | End: 2022-08-10

## 2022-08-10 RX ORDER — NITROGLYCERIN 0.4 MG/1
0.4 TABLET SUBLINGUAL AS NEEDED
Status: DISCONTINUED | OUTPATIENT
Start: 2022-08-10 | End: 2022-08-11 | Stop reason: HOSPADM

## 2022-08-10 RX ORDER — HEPARIN SODIUM 10000 [USP'U]/100ML
12-25 INJECTION, SOLUTION INTRAVENOUS
Status: DISCONTINUED | OUTPATIENT
Start: 2022-08-10 | End: 2022-08-10

## 2022-08-10 RX ORDER — OXYCODONE AND ACETAMINOPHEN 5; 325 MG/1; MG/1
1 TABLET ORAL
Status: DISCONTINUED | OUTPATIENT
Start: 2022-08-10 | End: 2022-08-11 | Stop reason: HOSPADM

## 2022-08-10 RX ADMIN — CARVEDILOL 25 MG: 25 TABLET, FILM COATED ORAL at 16:21

## 2022-08-10 RX ADMIN — HEPARIN SODIUM 5000 UNITS: 5000 INJECTION INTRAVENOUS; SUBCUTANEOUS at 06:26

## 2022-08-10 RX ADMIN — ASPIRIN 81 MG CHEWABLE TABLET 162 MG: 81 TABLET CHEWABLE at 08:16

## 2022-08-10 RX ADMIN — IPRATROPIUM BROMIDE AND ALBUTEROL SULFATE 3 ML: .5; 3 SOLUTION RESPIRATORY (INHALATION) at 09:16

## 2022-08-10 RX ADMIN — OXYCODONE HYDROCHLORIDE AND ACETAMINOPHEN 1 TABLET: 5; 325 TABLET ORAL at 22:46

## 2022-08-10 RX ADMIN — Medication 6 UNITS: at 22:47

## 2022-08-10 RX ADMIN — GUAIFENESIN 600 MG: 600 TABLET, EXTENDED RELEASE ORAL at 08:17

## 2022-08-10 RX ADMIN — BUDESONIDE 500 MCG: 0.5 SUSPENSION RESPIRATORY (INHALATION) at 09:16

## 2022-08-10 RX ADMIN — Medication 3 UNITS: at 16:21

## 2022-08-10 RX ADMIN — GABAPENTIN 400 MG: 400 CAPSULE ORAL at 08:17

## 2022-08-10 RX ADMIN — ARFORMOTEROL TARTRATE 15 MCG: 15 SOLUTION RESPIRATORY (INHALATION) at 09:16

## 2022-08-10 RX ADMIN — Medication 3 UNITS: at 13:09

## 2022-08-10 RX ADMIN — ARFORMOTEROL TARTRATE 15 MCG: 15 SOLUTION RESPIRATORY (INHALATION) at 20:39

## 2022-08-10 RX ADMIN — FUROSEMIDE 40 MG: 10 INJECTION, SOLUTION INTRAMUSCULAR; INTRAVENOUS at 08:18

## 2022-08-10 RX ADMIN — HYDRALAZINE HYDROCHLORIDE 100 MG: 50 TABLET, FILM COATED ORAL at 16:20

## 2022-08-10 RX ADMIN — MORPHINE SULFATE 1 MG: 2 INJECTION, SOLUTION INTRAMUSCULAR; INTRAVENOUS at 12:03

## 2022-08-10 RX ADMIN — IPRATROPIUM BROMIDE AND ALBUTEROL SULFATE 3 ML: .5; 3 SOLUTION RESPIRATORY (INHALATION) at 14:43

## 2022-08-10 RX ADMIN — Medication 0.4 MG: at 11:39

## 2022-08-10 RX ADMIN — IPRATROPIUM BROMIDE AND ALBUTEROL SULFATE 3 ML: .5; 3 SOLUTION RESPIRATORY (INHALATION) at 20:38

## 2022-08-10 RX ADMIN — Medication 15 UNITS: at 22:47

## 2022-08-10 RX ADMIN — ATORVASTATIN CALCIUM 80 MG: 40 TABLET, FILM COATED ORAL at 08:16

## 2022-08-10 RX ADMIN — BUDESONIDE 500 MCG: 0.5 SUSPENSION RESPIRATORY (INHALATION) at 20:38

## 2022-08-10 RX ADMIN — GUAIFENESIN 600 MG: 600 TABLET, EXTENDED RELEASE ORAL at 22:46

## 2022-08-10 RX ADMIN — FAMOTIDINE 20 MG: 20 TABLET ORAL at 08:17

## 2022-08-10 RX ADMIN — HEPARIN SODIUM 9 UNITS/KG/HR: 10000 INJECTION, SOLUTION INTRAVENOUS at 13:12

## 2022-08-10 RX ADMIN — HYDRALAZINE HYDROCHLORIDE 100 MG: 50 TABLET, FILM COATED ORAL at 22:46

## 2022-08-10 RX ADMIN — Medication 3 UNITS: at 08:19

## 2022-08-10 RX ADMIN — CARVEDILOL 25 MG: 25 TABLET, FILM COATED ORAL at 08:17

## 2022-08-10 NOTE — PROGRESS NOTES
CM tried to speak with patient to get [facility] for home health, patient did not answer room phone or cell phone.              Philip Gallardo RN  Case Management [phone]

## 2022-08-10 NOTE — PROGRESS NOTES
0720: Bedside shift change report given to Aysha Acosta (oncoming nurse) by Kellie Greenwood (offgoing nurse).  Report included the following information Intake/Output and Cardiac Rhythm SR .   Bedside skin assessment done

## 2022-08-10 NOTE — PROGRESS NOTES
CM spoke with patient, updated her that home health order placed for Skilled nursing. Patient refused, said she doesn't need skilled nursing for home health. Dennys Palma, and updated.              Traci Cuevas RN  Case Management [phone]

## 2022-08-10 NOTE — PROGRESS NOTES
Problem: Airway Clearance - Ineffective  Goal: Achieve or maintain patent airway  Outcome: Progressing Towards Goal     Problem: Gas Exchange - Impaired  Goal: Absence of hypoxia  Outcome: Progressing Towards Goal  Goal: Promote optimal lung function  Outcome: Progressing Towards Goal     Problem: Breathing Pattern - Ineffective  Goal: Ability to achieve and maintain a regular respiratory rate  Outcome: Progressing Towards Goal     Problem: Body Temperature -  Risk of, Imbalanced  Goal: Ability to maintain a body temperature within defined limits  Outcome: Progressing Towards Goal  Goal: Will regain or maintain usual level of consciousness  Outcome: Progressing Towards Goal  Goal: Complications related to the disease process, condition or treatment will be avoided or minimized  Outcome: Progressing Towards Goal     Problem: Isolation Precautions - Risk of Spread of Infection  Goal: Prevent transmission of infectious organism to others  Outcome: Progressing Towards Goal     Problem: Nutrition Deficits  Goal: Optimize nutrtional status  Outcome: Progressing Towards Goal     Problem: Risk for Fluid Volume Deficit  Goal: Maintain normal heart rhythm  Outcome: Progressing Towards Goal  Goal: Maintain absence of muscle cramping  Outcome: Progressing Towards Goal  Goal: Maintain normal serum potassium, sodium, calcium, phosphorus, and pH  Outcome: Progressing Towards Goal     Problem: Loneliness or Risk for Loneliness  Goal: Demonstrate positive use of time alone when socialization is not possible  Outcome: Progressing Towards Goal     Problem: Fatigue  Goal: Verbalize increase energy and improved vitality  Outcome: Progressing Towards Goal     Problem: Patient Education: Go to Patient Education Activity  Goal: Patient/Family Education  Outcome: Progressing Towards Goal     Problem: Falls - Risk of  Goal: *Absence of Falls  Description: Document Luis Miguel Fall Risk and appropriate interventions in the flowsheet.   Outcome: Progressing Towards Goal  Note: Fall Risk Interventions:  Mobility Interventions: Patient to call before getting OOB         Medication Interventions: Bed/chair exit alarm    Elimination Interventions: Call light in reach              Problem: Patient Education: Go to Patient Education Activity  Goal: Patient/Family Education  Outcome: Progressing Towards Goal     Problem: Patient Education: Go to Patient Education Activity  Goal: Patient/Family Education  Outcome: Progressing Towards Goal     Problem: Pressure Injury - Risk of  Goal: *Prevention of pressure injury  Description: Document Vladislav Scale and appropriate interventions in the flowsheet.   Outcome: Progressing Towards Goal  Note: Pressure Injury Interventions:       Moisture Interventions: Minimize layers    Activity Interventions: Assess need for specialty bed    Mobility Interventions: HOB 30 degrees or less    Nutrition Interventions: Document food/fluid/supplement intake    Friction and Shear Interventions: HOB 30 degrees or less                Problem: Patient Education: Go to Patient Education Activity  Goal: Patient/Family Education  Outcome: Progressing Towards Goal

## 2022-08-10 NOTE — DIABETES MGMT
Diabetes/ Glycemic Control Plan of Care  Recommendations:   Blood glucose this am 156 mg/dl  Noted steroids discontinued. Last dose yesterday  Continue current insulin regimen as ordered. Will continue inpatient monitoring. Fasting/ Morning blood glucose:   Lab Results   Component Value Date/Time    Glucose 172 (H) 08/10/2022 04:10 AM    Glucose (POC) 160 (H) 08/10/2022 12:02 PM     IV Fluids containing dextrose:  none   Steroids:  None. Last dose 8/9/2022     Blood glucose values:      Latest Reference Range & Units 8/9/22 11:41 8/9/22 17:15 8/9/22 21:42 8/10/22 07:56 8/10/22 12:02   GLUCOSE,FAST - POC 70 - 110 mg/dL 290 (H) 160 (H) 191 (H) 156 (H) 160 (H)   (H): Data is abnormally high  Within target range (70-180mg/dL):  Progressing  Current insulin orders:   Lantus 15 units every bedtime. Lispro corrective insulin coverage AC&HS as needed. Total Daily Dose previous 24 hours = 32 units   Current A1c:   Lab Results   Component Value Date/Time    Hemoglobin A1c 7.2 (H) 06/29/2022 03:12 AM      equivalent  to ave Blood Glucose of 160 mg/dl for 2-3 months prior to admission  Adequate glycemic control PTA:   yes   Nutrition/Diet:   Active Orders   Diet    ADULT DIET Regular; 4 carb choices (60 gm/meal)      Meal Intake:  No data found. Supplement Intake:  No data found. Home diabetes medications:   Key Antihyperglycemic Medications               insulin detemir (LEVEMIR) 100 unit/mL injection 15 units daily for diabetes          Plan/Goals:   Blood glucose will be within target of 70 - 180 mg/dl within 72 hours  Reinforce dietary and medication compliance at home. Education:  [] Refer to Diabetes Education Record                       [x] Education not indicated at this time   Covid-19 positive isolation. Attempted to call pts room to review home meds, no answer.     Dejon Scanlon [facility]

## 2022-08-10 NOTE — CONSULTS
Cardiology Initial Patient Referral Note    Cardiology referral request from Dr. Hugo Jaramillo for evaluation and management/treatment of chest pain    Date of  Admission: 8/8/2022  1:21 AM   Primary Care Physician:  Malgorzata Valverde MD    Attending Cardiologist: Dr. Jasvir Velazquez    Patient seen and independently examined. Patient admitted 2 days ago for hypoxic respiratory failure in the setting of COVID-19 pneumonia with possibly a component of heart failure as well. She has been aggressively diuresed greater than 5 L. She now appears to be euvolemic. She is complaining of pleuritic and reproducible chest pain which I suspect is due to her significant cough which she has had for the past 2 weeks. Her EKGs have been normal this admission. Repeat EKG today was unchanged. Her troponin level was normal.  I do not suspect she is having an acute coronary syndrome. Her chest pain is likely due to her underlying COVID-19 pneumonia and her cough. No need for systemic anticoagulation from a cardiac standpoint. Her heparin can be discontinued unless there is another indication. Agree with holding her diuretics with increasing creatinine levels. No further cardiac testing required this admission. She can follow-up with her primary cardiologist upon discharge. We will be available if additional questions arise. Yanelis Santos MD     Assessment:     -Acute hypoxic respiratory failure in setting of COVID pneumonia. Tested positive 7/27/2022 in [facility], remains COVID positive this admission. Was treated with steroids, IV abx x 7 days and discharged 8/6? Also treated with Paxlovid. -CAD, s/p cardiac cath 10/2019 with findings as follows:  LM: Angiographically normal  LAD: Previously placed ostial to proximal LAD stent is widely patent. D1 with ostial 50% stenosis. LCx: Minimal luminal irregularities. RCA: Minimal luminal irregularities.   -CARA on CKD, Cr up to 2.56 with BUN 64 on 8/10/2022; Cr 1.67 with BUN 37 on admission 8/8/2022  -Anemia, Hgb ~9.0 this admission. -HTN  -DM  -Hypercholesterolemia  -COPD    Primary cardiologist is Dr. Evin Mensah:     -Would not pursue Echo at this time, pt with reproducible chest pain, likely musculoskeletal.  -No need for Heparin infusion from cardiac standpoint.  -Recommend to keep Hgb > 8 from cardiac standpoint given history of CAD. -Continue ASA, Lipitor. No need for 162 mg ASA daily from cardiac standpoint, would decrease to 81 mg ASA daily if okay by all teams.  -Continue Coreg as tolerated by BP. May need to reduce dose depending on BP.  -Consider nephrology consultation given CARA, although suspect over-diuresis as likely culprit. Will discontinue IV diuretics, continue to monitor renal function closely. -Treatment of underlying COVID infection per ID team, appreciate assistance. History of Present Illness: This is a 81 y.o. female admitted for COPD (chronic obstructive pulmonary disease) [facility] [J44.9]  Hypoxia [R09.02]  COVID [U07.1]. Patient complains of: shortness of breath      Charlie Castillo is a 81 y.o. female who presented to the ER due to shortness of breath. Pt reports that she was admitted for COVID in [facility] for 3-4 days (discharged 7/30/2022) and drove back home the following day. She presented to the ER on Monday due to shortness of breath. She reports she was unable to sleep Sunday night due to shortness of breath, with worsened symptoms Monday morning, thus she decided to present to the ER for evaluation. She was again found to be COVID + and had concern for CHF as well. She received scheduled IV diuretics, with worsening of renal function. Cardiology has been consulted due to c/o sharp L parasternal chest pain radiating to back, worse with coughing. Pt reports she has been having chest congestion and non-productive wheezy cough. No recorded fevers over last 48 hours.   She noted some swelling to her legs after 13-hour drive from [facility], currently resolved. Cardiac risk factors: dyslipidemia, diabetes mellitus, hypertension, CAD      Review of Symptoms:  Except as stated above include:  Constitutional:  negative  Respiratory:  + cough  Cardiovascular:  + chest pain  Gastrointestinal: negative  Genitourinary:  negative  Musculoskeletal:  Negative  Neurological:  Negative  Dermatological:  Negative  Endocrinological: Negative  Psychological:  Negative       Past Medical History:     Past Medical History:   Diagnosis Date    Abnormal WBC count 2016    Anemia     Bilateral shoulder pain 2016    Chondromalacia of both patellae     COPD (chronic obstructive pulmonary disease) ([facility] 2015    mild-mod dz; Dr Cat Galicia    Diabetes [facility])     Diabetic eye exam ([facility]     D[facility]     Dyslipidemia     Gout     Heart attack ([facility] 10/18/2019    History of echocardiogram 2014    Mild LVE. EF 40%. Mild, diffuse hypk. Mild LAE. Mild MR.       Hypercholesteremia 14    Hypertension 2000    Noncompliance with medications 2016    Obesity     Orthostatic hypotension 2016    Osteoarthritis of both knees     Pain management 2016    Dr. Osito Davis     Popliteal cyst, bilateral      Stage 3b chronic kidney disease ([facility] 2022    Vitamin D deficiency 10/16/14         Social History:     Social History     Socioeconomic History    Marital status:     Number of children: 3   Occupational History    Occupation: retired   Tobacco Use    Smoking status: Former     Packs/day: 0.25     Years: 48.00     Pack years: 12.00     Types: Cigarettes     Quit date: 2018     Years since quittin.2    Smokeless tobacco: Never   Substance and Sexual Activity    Alcohol use: No     Alcohol/week: 0.0 standard drinks    Drug use: No    Sexual activity: Yes     Partners: Male   Other Topics Concern     Service No    Blood Transfusions No    Caffeine Concern No    Occupational Exposure No    Hobby Hazards No Sleep Concern No    Stress Concern No    Weight Concern No    Special Diet No    Back Care No    Exercise No    Bike Helmet No    Seat Belt Yes    Self-Exams Yes        Family History:     Family History   Problem Relation Age of Onset    Diabetes Mother     Hypertension Mother     Hypertension Father     Kidney Disease Maternal Aunt 48        Dialysis        Medications:   No Known Allergies     Current Facility-Administered Medications   Medication Dose Route Frequency    nitroglycerin (NITROSTAT) tablet 0.4 mg  0.4 mg SubLINGual PRN    heparin (porcine) 25,000 units in 0.45% saline 250 ml infusion  9-25 Units/kg/hr IntraVENous TITRATE    morphine injection 1 mg  1 mg IntraVENous ONCE    famotidine (PEPCID) tablet 20 mg  20 mg Oral DAILY    dextrose 10% infusion 0-250 mL  0-250 mL IntraVENous PRN    insulin lispro (HUMALOG) injection   SubCUTAneous AC&HS    glucose chewable tablet 16 g  4 Tablet Oral PRN    glucagon (GLUCAGEN) injection 1 mg  1 mg IntraMUSCular PRN    budesonide (PULMICORT) 500 mcg/2 ml nebulizer suspension  500 mcg Nebulization BID RT    And    arformoteroL (BROVANA) neb solution 15 mcg  15 mcg Nebulization BID RT    albuterol-ipratropium (DUO-NEB) 2.5 MG-0.5 MG/3 ML  3 mL Nebulization Q6H RT    aspirin chewable tablet 162 mg  162 mg Oral DAILY    atorvastatin (LIPITOR) tablet 80 mg  80 mg Oral DAILY    carvediloL (COREG) tablet 25 mg  25 mg Oral BID WITH MEALS    furosemide (LASIX) injection 40 mg  40 mg IntraVENous BID    hydrALAZINE (APRESOLINE) tablet 100 mg  100 mg Oral TID    guaiFENesin ER (MUCINEX) tablet 600 mg  600 mg Oral Q12H    gabapentin (NEURONTIN) capsule 400 mg  400 mg Oral DAILY    insulin glargine (LANTUS) injection 15 Units  15 Units SubCUTAneous QHS    melatonin (rapid dissolve) tablet 5 mg  5 mg Oral QHS PRN    albuterol-ipratropium (DUO-NEB) 2.5 MG-0.5 MG/3 ML  3 mL Nebulization Q6H PRN    ondansetron (ZOFRAN) injection 4 mg  4 mg IntraVENous Q6H PRN    acetaminophen (TYLENOL) tablet 650 mg  650 mg Oral Q6H PRN         Physical Exam:   Visit Vitals  BP (!) 109/52 (BP 1 Location: Right lower arm)   Pulse 80   Temp 99.2 °F (37.3 °C)   Resp 22   Ht 5' 4\" (1.626 m)   Wt 106.6 kg (235 lb)   SpO2 100%   BMI 40.34 kg/m²       BP Readings from Last 3 Encounters:   08/10/22 (!) 109/52   07/11/22 (!) 146/78   06/29/22 128/67     Pulse Readings from Last 3 Encounters:   08/10/22 80   07/11/22 73   06/29/22 81     Wt Readings from Last 3 Encounters:   08/08/22 106.6 kg (235 lb)   07/10/22 104.1 kg (229 lb 9.6 oz)   06/29/22 97.1 kg (214 lb)       General:  alert, cooperative, no distress, appears stated age  Neck:  supple  Chest wall: reproducible chest wall tenderness to L parasternal area  Lungs:  decreased breath sounds with faint expiratory wheezing  Heart:  regular rate and rhythm  Abdomen:  abdomen is soft without significant tenderness, masses, organomegaly or guarding  Extremities:  atraumatic, no edema  Skin: Warm and dry.    Neuro: alert, oriented x3, affect appropriate, no focal neurological deficits, moves all extremities well, no involuntary movements  Psych: non focal     Data Review:     Recent Labs     08/10/22  0410 08/09/22  0057 08/08/22  0148   WBC 12.8 11.3 10.3   HGB 9.0* 9.5* 8.9*   HCT 29.2* 31.3* 29.2*    312 260     Recent Labs     08/10/22  0410 08/09/22  0057 08/08/22  0148    139 142   K 5.1 5.4 5.4    108 112*   CO2 23 24 23   * 146* 136*   BUN 64* 44* 37*   CREA 2.56* 2.01* 1.67*   CA 8.6 8.9 9.0   MG  --   --  2.0   ALB  --   --  3.2*   ALT  --   --  75*       Results for orders placed or performed during the hospital encounter of 08/08/22   EKG, 12 LEAD, INITIAL   Result Value Ref Range    Ventricular Rate 75 BPM    Atrial Rate 75 BPM    P-R Interval 160 ms    QRS Duration 78 ms    Q-T Interval 390 ms    QTC Calculation (Bezet) 435 ms    Calculated P Axis 67 degrees    Calculated R Axis 22 degrees    Calculated T Axis 102 degrees Diagnosis       Normal sinus rhythm  Minimal voltage criteria for LVH, may be normal variant  Abnormal QRS-T angle, consider primary T wave abnormality  Abnormal ECG  When compared with ECG of 08-AUG-2022 02:08,  No significant change was found     Results for orders placed or performed in visit on 08/23/18   AMB POC EKG ROUTINE W/ 12 LEADS, INTER & REP    Impression    See progress note.        Last Lipid:    Lab Results   Component Value Date/Time    Cholesterol, total 152 10/10/2019 12:00 AM    HDL Cholesterol 46 10/10/2019 12:00 AM    LDL, calculated 86 10/10/2019 12:00 AM    Triglyceride 101 10/10/2019 12:00 AM    CHOL/HDL Ratio 5.6 (H) 05/21/2018 06:23 AM       Cardiographics:     EKG Results       Procedure 720 Value Units Date/Time    EKG, 12 LEAD, INITIAL [[id number]] Collected: 08/10/22 1111    Order Status: Completed Updated: 08/10/22 1144     Ventricular Rate 75 BPM      Atrial Rate 75 BPM      P-R Interval 160 ms      QRS Duration 78 ms      Q-T Interval 390 ms      QTC Calculation (Bezet) 435 ms      Calculated P Axis 67 degrees      Calculated R Axis 22 degrees      Calculated T Axis 102 degrees      Diagnosis --     Normal sinus rhythm  Minimal voltage criteria for LVH, may be normal variant  Abnormal QRS-T angle, consider primary T wave abnormality  Abnormal ECG  When compared with ECG of 08-AUG-2022 02:08,  No significant change was found      EKG, 12 LEAD, INITIAL [[id number]] Collected: 08/08/22 0208    Order Status: Completed Updated: 08/08/22 0732     Ventricular Rate 85 BPM      Atrial Rate 85 BPM      P-R Interval 170 ms      QRS Duration 80 ms      Q-T Interval 350 ms      QTC Calculation (Bezet) 416 ms      Calculated P Axis 66 degrees      Calculated R Axis 18 degrees      Calculated T Axis 87 degrees      Diagnosis --     Normal sinus rhythm  Normal ECG  When compared with ECG of 08-JUL-2022 09:40,  No significant change was found  Confirmed by Trang Chow ([id number]) on 8/8/2022 7:32:45 AM Signed By: Nuris Cowan PA-C     August 10, 2022

## 2022-08-10 NOTE — ROUTINE PROCESS
Received bedside report from [address] [place] [address], report included SBAR, MAR, and Kardex. Patient was resting in bed, watching television, HOB elevated, bed in lowest position. Hygiene care provided after vitals were taken. Gave bedside report to [address] [place] [address], report included SBAR, MAR, and Kardex.

## 2022-08-10 NOTE — PROGRESS NOTES
1915: Bedside shift change report given to Rich Dove RN (oncoming nurse) by Jair Hughes (offgoing nurse). Report included the following information SBAR, Kardex, [place], Med Rec Status, and Cardiac Rhythm /nsr .

## 2022-08-10 NOTE — PROGRESS NOTES
Problem: Airway Clearance - Ineffective  Goal: Achieve or maintain patent airway  8/10/2022 1008 by Rody Ding RN  Outcome: Progressing Towards Goal  8/10/2022 1007 by Rody Ding RN  Outcome: Progressing Towards Goal     Problem: Gas Exchange - Impaired  Goal: Absence of hypoxia  8/10/2022 1008 by Rody Ding RN  Outcome: Progressing Towards Goal  8/10/2022 1007 by Rody Ding RN  Outcome: Progressing Towards Goal  Goal: Promote optimal lung function  8/10/2022 1008 by Rody Ding RN  Outcome: Progressing Towards Goal  8/10/2022 1007 by Rody Ding RN  Outcome: Progressing Towards Goal     Problem: Breathing Pattern - Ineffective  Goal: Ability to achieve and maintain a regular respiratory rate  8/10/2022 1008 by Rody Ding RN  Outcome: Progressing Towards Goal  8/10/2022 1007 by Rody Ding RN  Outcome: Progressing Towards Goal     Problem:  Body Temperature -  Risk of, Imbalanced  Goal: Ability to maintain a body temperature within defined limits  8/10/2022 1008 by Rody Ding RN  Outcome: Progressing Towards Goal  8/10/2022 1007 by Rody Ding RN  Outcome: Progressing Towards Goal  Goal: Will regain or maintain usual level of consciousness  8/10/2022 1008 by Rody Ding RN  Outcome: Progressing Towards Goal  8/10/2022 1007 by Rody Ding RN  Outcome: Progressing Towards Goal  Goal: Complications related to the disease process, condition or treatment will be avoided or minimized  8/10/2022 1008 by Rody Ding RN  Outcome: Progressing Towards Goal  8/10/2022 1007 by Rody Ding RN  Outcome: Progressing Towards Goal     Problem: Isolation Precautions - Risk of Spread of Infection  Goal: Prevent transmission of infectious organism to others  8/10/2022 1008 by Rody Ding RN  Outcome: Progressing Towards Goal  8/10/2022 1007 by Rody Ding RN  Outcome: Progressing Towards Goal     Problem: Nutrition Deficits  Goal: Optimize nutrtional status  8/10/2022 1008 by Cristel Garcia RN  Outcome: Progressing Towards Goal  8/10/2022 1007 by Cristel Garcia RN  Outcome: Progressing Towards Goal     Problem: Risk for Fluid Volume Deficit  Goal: Maintain normal heart rhythm  8/10/2022 1008 by Cristel Garcia RN  Outcome: Progressing Towards Goal  8/10/2022 1007 by Cristel Garcia RN  Outcome: Progressing Towards Goal  Goal: Maintain absence of muscle cramping  8/10/2022 1008 by Cristel Garcia RN  Outcome: Progressing Towards Goal  8/10/2022 1007 by Cristel Garcia RN  Outcome: Progressing Towards Goal  Goal: Maintain normal serum potassium, sodium, calcium, phosphorus, and pH  8/10/2022 1008 by Cristel Garcia RN  Outcome: Progressing Towards Goal  8/10/2022 1007 by Cristel Garcia RN  Outcome: Progressing Towards Goal     Problem: Loneliness or Risk for Loneliness  Goal: Demonstrate positive use of time alone when socialization is not possible  8/10/2022 1008 by Cristel Garcia RN  Outcome: Progressing Towards Goal  8/10/2022 1007 by Cristel Garcia RN  Outcome: Progressing Towards Goal     Problem: Fatigue  Goal: Verbalize increase energy and improved vitality  8/10/2022 1008 by Cristel Garcia RN  Outcome: Progressing Towards Goal  8/10/2022 1007 by Cristel Garcia RN  Outcome: Progressing Towards Goal     Problem: Patient Education: Go to Patient Education Activity  Goal: Patient/Family Education  8/10/2022 1008 by Cristel Garcia RN  Outcome: Progressing Towards Goal  8/10/2022 1007 by Cristel Garcia RN  Outcome: Progressing Towards Goal     Problem: Falls - Risk of  Goal: *Absence of Falls  Description: Document Dinesh Thorne Fall Risk and appropriate interventions in the flowsheet.   8/10/2022 1008 by Cristel Garcia RN  Outcome: Progressing Towards Goal  Note: Fall Risk Interventions:  Mobility Interventions: Patient to call before getting OOB         Medication Interventions: Bed/chair exit alarm    Elimination Interventions: Call light in reach 8/10/2022 1007 by Yelitza Baldwin RN  Outcome: Progressing Towards Goal  Note: Fall Risk Interventions:  Mobility Interventions: Patient to call before getting OOB         Medication Interventions: Bed/chair exit alarm    Elimination Interventions: Call light in reach              Problem: Patient Education: Go to Patient Education Activity  Goal: Patient/Family Education  8/10/2022 1008 by Yelitza Baldwin RN  Outcome: Progressing Towards Goal  8/10/2022 1007 by Yelitza Baldwin RN  Outcome: Progressing Towards Goal     Problem: Patient Education: Go to Patient Education Activity  Goal: Patient/Family Education  8/10/2022 1008 by Yelitza Baldwin RN  Outcome: Progressing Towards Goal  8/10/2022 1007 by Yelitza Baldwin RN  Outcome: Progressing Towards Goal     Problem: Pressure Injury - Risk of  Goal: *Prevention of pressure injury  Description: Document Vladislav Scale and appropriate interventions in the flowsheet.   8/10/2022 1008 by Yelitza Baldwin RN  Outcome: Progressing Towards Goal  Note: Pressure Injury Interventions:       Moisture Interventions: Minimize layers    Activity Interventions: Assess need for specialty bed    Mobility Interventions: HOB 30 degrees or less    Nutrition Interventions: Document food/fluid/supplement intake    Friction and Shear Interventions: HOB 30 degrees or less             8/10/2022 1007 by Yelitza Baldwin RN  Outcome: Progressing Towards Goal  Note: Pressure Injury Interventions:       Moisture Interventions: Minimize layers    Activity Interventions: Assess need for specialty bed    Mobility Interventions: HOB 30 degrees or less    Nutrition Interventions: Document food/fluid/supplement intake    Friction and Shear Interventions: HOB 30 degrees or less                Problem: Patient Education: Go to Patient Education Activity  Goal: Patient/Family Education  8/10/2022 1008 by Yelitza Baldwin RN  Outcome: Progressing Towards Goal  8/10/2022 1007 by Yelitza Baldwin RN  Outcome: Progressing Towards Goal

## 2022-08-11 VITALS
WEIGHT: 218.7 LBS | HEART RATE: 74 BPM | DIASTOLIC BLOOD PRESSURE: 71 MMHG | SYSTOLIC BLOOD PRESSURE: 112 MMHG | TEMPERATURE: 98.9 F | BODY MASS INDEX: 37.34 KG/M2 | OXYGEN SATURATION: 95 % | RESPIRATION RATE: 18 BRPM | HEIGHT: 64 IN

## 2022-08-11 LAB
ANION GAP SERPL CALC-SCNC: 7 MMOL/L (ref 3–18)
BASOPHILS # BLD: 0 K/UL (ref 0–0.1)
BASOPHILS NFR BLD: 0 % (ref 0–2)
BUN SERPL-MCNC: 71 MG/DL (ref 7–18)
BUN/CREAT SERPL: 30 (ref 12–20)
CALCIUM SERPL-MCNC: 8.4 MG/DL (ref 8.5–10.1)
CHLORIDE SERPL-SCNC: 109 MMOL/L (ref 100–111)
CO2 SERPL-SCNC: 24 MMOL/L (ref 21–32)
CREAT SERPL-MCNC: 2.37 MG/DL (ref 0.6–1.3)
DIFFERENTIAL METHOD BLD: ABNORMAL
EOSINOPHIL # BLD: 0 K/UL (ref 0–0.4)
EOSINOPHIL NFR BLD: 0 % (ref 0–5)
ERYTHROCYTE [DISTWIDTH] IN BLOOD BY AUTOMATED COUNT: 19.3 % (ref 11.6–14.5)
GLUCOSE BLD STRIP.AUTO-MCNC: 137 MG/DL (ref 70–110)
GLUCOSE BLD STRIP.AUTO-MCNC: 144 MG/DL (ref 70–110)
GLUCOSE BLD STRIP.AUTO-MCNC: 147 MG/DL (ref 70–110)
GLUCOSE SERPL-MCNC: 156 MG/DL (ref 74–99)
HCT VFR BLD AUTO: 29.4 % (ref 35–45)
HGB BLD-MCNC: 8.9 G/DL (ref 12–16)
IMM GRANULOCYTES # BLD AUTO: 0.2 K/UL (ref 0–0.04)
IMM GRANULOCYTES NFR BLD AUTO: 2 % (ref 0–0.5)
LYMPHOCYTES # BLD: 2.1 K/UL (ref 0.9–3.6)
LYMPHOCYTES NFR BLD: 20 % (ref 21–52)
MCH RBC QN AUTO: 28 PG (ref 24–34)
MCHC RBC AUTO-ENTMCNC: 30.3 G/DL (ref 31–37)
MCV RBC AUTO: 92.5 FL (ref 78–100)
MONOCYTES # BLD: 1.2 K/UL (ref 0.05–1.2)
MONOCYTES NFR BLD: 11 % (ref 3–10)
NEUTS SEG # BLD: 7.1 K/UL (ref 1.8–8)
NEUTS SEG NFR BLD: 66 % (ref 40–73)
NRBC # BLD: 0.19 K/UL (ref 0–0.01)
NRBC BLD-RTO: 1.8 PER 100 WBC
PLATELET # BLD AUTO: 277 K/UL (ref 135–420)
PMV BLD AUTO: 9.9 FL (ref 9.2–11.8)
POTASSIUM SERPL-SCNC: 4.6 MMOL/L (ref 3.5–5.5)
RBC # BLD AUTO: 3.18 M/UL (ref 4.2–5.3)
SODIUM SERPL-SCNC: 140 MMOL/L (ref 136–145)
WBC # BLD AUTO: 10.6 K/UL (ref 4.6–13.2)

## 2022-08-11 PROCEDURE — 74011250637 HC RX REV CODE- 250/637: Performed by: HOSPITALIST

## 2022-08-11 PROCEDURE — 82962 GLUCOSE BLOOD TEST: CPT

## 2022-08-11 PROCEDURE — 80048 BASIC METABOLIC PNL TOTAL CA: CPT

## 2022-08-11 PROCEDURE — 85025 COMPLETE CBC W/AUTO DIFF WBC: CPT

## 2022-08-11 PROCEDURE — 74011250637 HC RX REV CODE- 250/637: Performed by: PHYSICIAN ASSISTANT

## 2022-08-11 PROCEDURE — 36415 COLL VENOUS BLD VENIPUNCTURE: CPT

## 2022-08-11 PROCEDURE — 99239 HOSP IP/OBS DSCHRG MGMT >30: CPT | Performed by: STUDENT IN AN ORGANIZED HEALTH CARE EDUCATION/TRAINING PROGRAM

## 2022-08-11 PROCEDURE — 74011250637 HC RX REV CODE- 250/637: Performed by: STUDENT IN AN ORGANIZED HEALTH CARE EDUCATION/TRAINING PROGRAM

## 2022-08-11 PROCEDURE — 94640 AIRWAY INHALATION TREATMENT: CPT

## 2022-08-11 RX ORDER — FLUTICASONE PROPIONATE 110 UG/1
2 AEROSOL, METERED RESPIRATORY (INHALATION)
Status: DISCONTINUED | OUTPATIENT
Start: 2022-08-11 | End: 2022-08-11 | Stop reason: HOSPADM

## 2022-08-11 RX ORDER — FUROSEMIDE 20 MG/1
20 TABLET ORAL DAILY
Qty: 60 TABLET | Refills: 0 | Status: SHIPPED | OUTPATIENT
Start: 2022-08-11 | End: 2022-09-22

## 2022-08-11 RX ADMIN — GUAIFENESIN 600 MG: 600 TABLET, EXTENDED RELEASE ORAL at 08:36

## 2022-08-11 RX ADMIN — FLUTICASONE PROPIONATE 2 PUFF: 110 AEROSOL, METERED RESPIRATORY (INHALATION) at 11:49

## 2022-08-11 RX ADMIN — ASPIRIN 81 MG CHEWABLE TABLET 162 MG: 81 TABLET CHEWABLE at 08:35

## 2022-08-11 RX ADMIN — CARVEDILOL 25 MG: 25 TABLET, FILM COATED ORAL at 08:36

## 2022-08-11 RX ADMIN — IPRATROPIUM BROMIDE AND ALBUTEROL 1 PUFF: 20; 100 SPRAY, METERED RESPIRATORY (INHALATION) at 13:39

## 2022-08-11 RX ADMIN — OXYCODONE HYDROCHLORIDE AND ACETAMINOPHEN 1 TABLET: 5; 325 TABLET ORAL at 04:54

## 2022-08-11 RX ADMIN — FAMOTIDINE 20 MG: 20 TABLET ORAL at 08:36

## 2022-08-11 RX ADMIN — ATORVASTATIN CALCIUM 80 MG: 40 TABLET, FILM COATED ORAL at 08:36

## 2022-08-11 RX ADMIN — GABAPENTIN 400 MG: 400 CAPSULE ORAL at 08:36

## 2022-08-11 RX ADMIN — OLODATEROL RESPIMAT INHALATION SPRAY 2 PUFF: 2.5 SPRAY, METERED RESPIRATORY (INHALATION) at 11:49

## 2022-08-11 RX ADMIN — CARVEDILOL 25 MG: 25 TABLET, FILM COATED ORAL at 16:30

## 2022-08-11 RX ADMIN — OXYCODONE HYDROCHLORIDE AND ACETAMINOPHEN 1 TABLET: 5; 325 TABLET ORAL at 11:23

## 2022-08-11 NOTE — PROGRESS NOTES
Progress Note  Hospitalist Service    Patient: Chapo Malik MRN: [id number]   SSN: [id number]  YOB: 1950   Age: 81 y.o. Sex: female      Admit Date: 8/8/2022    LOS: 2 days   Chief Complaint   Patient presents with    Shortness of Breath       Subjective:     Patient with \"10/10\" chest pain this morning. Seen by cardiology. Not consistent with ACS. Objective:     Vitals:  Visit Vitals  BP (!) 149/53 (BP 1 Location: Right lower arm, BP Patient Position: Lying)   Pulse 76   Temp 98.3 °F (36.8 °C)   Resp 20   Ht 5' 4\" (1.626 m)   Wt 106.6 kg (235 lb)   SpO2 96%   BMI 40.34 kg/m²       Physical Exam:   General appearance: alert, cooperative, no distress, appears stated age  Lungs: rhonchi R base, L base, diminished breath sounds R base, L base  Heart: regular rate and rhythm, S1, S2 normal, no murmur, click, rub or gallop  Abdomen: soft, non-tender. Bowel sounds normal. No masses,  no organomegaly  Pulses: 2+ and symmetric  Skin: Skin color, texture, turgor normal. No rashes or lesions  Neuro:  normal without focal findings  mental status, speech normal, alert and oriented x iii  PHYLICIA  reflexes normal and symmetric    Intake and Output:  Current Shift: No intake/output data recorded.   Last three shifts: 08/09 0701 - 08/10 1900  In: -   Out: 2600 [Urine:2600]    Lab/Data Review:  Recent Results (from the past 12 hour(s))   EKG, 12 LEAD, INITIAL    Collection Time: 08/10/22 11:11 AM   Result Value Ref Range    Ventricular Rate 75 BPM    Atrial Rate 75 BPM    P-R Interval 160 ms    QRS Duration 78 ms    Q-T Interval 390 ms    QTC Calculation (Bezet) 435 ms    Calculated P Axis 67 degrees    Calculated R Axis 22 degrees    Calculated T Axis 102 degrees    Diagnosis       Normal sinus rhythm  Minimal voltage criteria for LVH, may be normal variant  Abnormal ECG  When compared with ECG of 08-AUG-2022 02:08,  No significant change was found  Confirmed by Arvin Topete MD, [facility] ([id number]) on 8/10/2022 4:53:24 PM     TROPONIN-HIGH SENSITIVITY    Collection Time: 08/10/22 11:55 AM   Result Value Ref Range    Troponin-High Sensitivity 26 0 - 54 ng/L   GLUCOSE, POC    Collection Time: 08/10/22 12:02 PM   Result Value Ref Range    Glucose (POC) 160 (H) 70 - 110 mg/dL   GLUCOSE, POC    Collection Time: 08/10/22  3:47 PM   Result Value Ref Range    Glucose (POC) 164 (H) 70 - 110 mg/dL         Key Findings or tests:       Telemetry NONE   Oxygen NONE     Assessment and Plan:     1) Acute hypoxic respiratory failure secondary to CHF with ground glass opacities due to COVID-19 pneumonia, COPD. 2) Acute on chronic CHF -   3) Pleural effusion due to CHF  4) COPD with mild exacerbation  5) recent COVID-19 pneumonia, possible bacterial infection superimposed  6) Type II DM with hyperglcyemia  7) Morbid obesity - BMP 40.34  8) CAD  9) HTN, essential  10) CARA on CKD stage III  11) normocytic anemia     Positive COVID since 7/27  S/p steroids, IV antibiotics in [place]  S/p Paxclovid   All antibiotics discontinued.   Stop Lasix 2/2 to CARA    Probable discharge in next 24-48 hours      Anna Mills DO, hospitalist   August 10, 2022

## 2022-08-11 NOTE — PROGRESS NOTES
Problem: Airway Clearance - Ineffective  Goal: Achieve or maintain patent airway  Outcome: Progressing Towards Goal     Problem: Gas Exchange - Impaired  Goal: Absence of hypoxia  Outcome: Progressing Towards Goal  Goal: Promote optimal lung function  Outcome: Progressing Towards Goal     Problem: Breathing Pattern - Ineffective  Goal: Ability to achieve and maintain a regular respiratory rate  Outcome: Progressing Towards Goal     Problem: Body Temperature -  Risk of, Imbalanced  Goal: Ability to maintain a body temperature within defined limits  Outcome: Progressing Towards Goal  Goal: Will regain or maintain usual level of consciousness  Outcome: Progressing Towards Goal  Goal: Complications related to the disease process, condition or treatment will be avoided or minimized  Outcome: Progressing Towards Goal     Problem: Isolation Precautions - Risk of Spread of Infection  Goal: Prevent transmission of infectious organism to others  Outcome: Progressing Towards Goal     Problem: Nutrition Deficits  Goal: Optimize nutrtional status  Outcome: Progressing Towards Goal     Problem: Risk for Fluid Volume Deficit  Goal: Maintain normal heart rhythm  Outcome: Progressing Towards Goal  Goal: Maintain absence of muscle cramping  Outcome: Progressing Towards Goal  Goal: Maintain normal serum potassium, sodium, calcium, phosphorus, and pH  Outcome: Progressing Towards Goal     Problem: Loneliness or Risk for Loneliness  Goal: Demonstrate positive use of time alone when socialization is not possible  Outcome: Progressing Towards Goal     Problem: Fatigue  Goal: Verbalize increase energy and improved vitality  Outcome: Progressing Towards Goal     Problem: Patient Education: Go to Patient Education Activity  Goal: Patient/Family Education  Outcome: Progressing Towards Goal     Problem: Falls - Risk of  Goal: *Absence of Falls  Description: Document Luis Miguel Fall Risk and appropriate interventions in the flowsheet.   Outcome: Progressing Towards Goal  Note: Fall Risk Interventions:  Mobility Interventions: Assess mobility with egress test         Medication Interventions: Teach patient to arise slowly    Elimination Interventions: Call light in reach, Toilet paper/wipes in reach              Problem: Patient Education: Go to Patient Education Activity  Goal: Patient/Family Education  Outcome: Progressing Towards Goal     Problem: Patient Education: Go to Patient Education Activity  Goal: Patient/Family Education  Outcome: Progressing Towards Goal     Problem: Pressure Injury - Risk of  Goal: *Prevention of pressure injury  Description: Document Vladislav Scale and appropriate interventions in the flowsheet.   Outcome: Progressing Towards Goal     Problem: Patient Education: Go to Patient Education Activity  Goal: Patient/Family Education  Outcome: Progressing Towards Goal

## 2022-08-11 NOTE — PROGRESS NOTES
D/C order noted for today. Orders reviewed. No needs identified at this time.  will transport home. Patient refused home health. CM remains available if needed.     LUZ MARIA FoyN, RN  Care Management

## 2022-08-11 NOTE — PROGRESS NOTES
0720: Bedside shift change report given to [address] (oncoming nurse) by Michelle Griffiths (offgoing nurse). Report included the following information SBAR, Kardex, Intake/Output, MAR, and Cardiac Rhythm NSR with PVCs .

## 2022-08-11 NOTE — PROGRESS NOTES
Patient discharged home by   Discharge instructions medications and follow-up appointments reviewed with patient at bedside. All questions asked and answered. PIV removed intact.   Waiting on transport

## 2022-08-11 NOTE — PROGRESS NOTES
0800 Breathing treatment held at this time. Called Pharmacy 0900 to change orders to inhalers due to Droplet plus. [id number] for inhalers, Waiting on Pharmacy to send inhalers.

## 2022-08-11 NOTE — DIABETES MGMT
Diabetes/ Glycemic Control Plan of Care  Recommendations:   Blood glucose this am 147 mg/dl  Noted steroids discontinued. Last dose 8/9/2022  Continue current insulin regimen as ordered. Will continue inpatient monitoring. Fasting/ Morning blood glucose:   Lab Results   Component Value Date/Time    Glucose 156 (H) 08/11/2022 01:27 AM    Glucose (POC) 144 (H) 08/11/2022 11:17 AM     IV Fluids containing dextrose:  none   Steroids:  None. Last dose 8/9/2022     Blood glucose values:        Latest Reference Range & Units 8/10/22 12:02 8/10/22 15:47 8/10/22 22:06 8/11/22 07:29 8/11/22 11:17   GLUCOSE,FAST - POC 70 - 110 mg/dL 160 (H) 164 (H) 230 (H) 147 (H) 144 (H)   (H): Data is abnormally high  Within target range (70-180mg/dL):  yes   Current insulin orders:   Lantus 15 units every bedtime. Lispro corrective insulin coverage AC&HS as needed. Total Daily Dose previous 24 hours = 30 units   Current A1c:   Lab Results   Component Value Date/Time    Hemoglobin A1c 7.2 (H) 06/29/2022 03:12 AM      Equivalent  to an average Blood Glucose of 160 mg/dl for 2-3 months prior to admission  Adequate glycemic control PTA:   yes   Nutrition/Diet:   Active Orders   Diet    ADULT DIET Regular; 4 carb choices (60 gm/meal)      Meal Intake:  No data found. Supplement Intake:  No data found. Home diabetes medications:   Key Antihyperglycemic Medications               insulin detemir (LEVEMIR) 100 unit/mL injection 15 units daily for diabetes          Plan/Goals:   Blood glucose will be within target of 70 - 180 mg/dl within 72 hours  Reinforce dietary and medication compliance at home. Education:  [] Refer to Diabetes Education Record                       [x] Education not indicated at this time   Covid-19 positive isolation. Attempted to call pts room to review home meds, no answer.     Kelsi Jaime

## 2022-08-11 NOTE — PROGRESS NOTES
Problem: Airway Clearance - Ineffective  Goal: Achieve or maintain patent airway  Outcome: Progressing Towards Goal     Problem: Gas Exchange - Impaired  Goal: Absence of hypoxia  Outcome: Progressing Towards Goal  Goal: Promote optimal lung function  Outcome: Progressing Towards Goal     Problem: Breathing Pattern - Ineffective  Goal: Ability to achieve and maintain a regular respiratory rate  Outcome: Progressing Towards Goal     Problem:  Body Temperature -  Risk of, Imbalanced  Goal: Ability to maintain a body temperature within defined limits  Outcome: Progressing Towards Goal  Goal: Will regain or maintain usual level of consciousness  Outcome: Progressing Towards Goal  Goal: Complications related to the disease process, condition or treatment will be avoided or minimized  Outcome: Progressing Towards Goal     Problem: Isolation Precautions - Risk of Spread of Infection  Goal: Prevent transmission of infectious organism to others  Outcome: Progressing Towards Goal     Problem: Risk for Fluid Volume Deficit  Goal: Maintain normal heart rhythm  Outcome: Progressing Towards Goal  Goal: Maintain absence of muscle cramping  Outcome: Progressing Towards Goal  Goal: Maintain normal serum potassium, sodium, calcium, phosphorus, and pH  Outcome: Progressing Towards Goal     Problem: Loneliness or Risk for Loneliness  Goal: Demonstrate positive use of time alone when socialization is not possible  Outcome: Progressing Towards Goal     Problem: Fatigue  Goal: Verbalize increase energy and improved vitality  Outcome: Progressing Towards Goal     Problem: Patient Education: Go to Patient Education Activity  Goal: Patient/Family Education  Outcome: Progressing Towards Goal

## 2022-08-11 NOTE — DISCHARGE SUMMARY
Discharge Summary    Patient: Charlie Castillo MRN: [id number]  CSN: [id number]    YOB: 1950  Age: 81 y.o. Sex: female    DOA: 8/8/2022 LOS:  LOS: 3 days   Discharge Date:      Admission Diagnosis: COPD (chronic obstructive pulmonary disease) ([facility] [J44.9]  Hypoxia [R09.02]  COVID [U07.1]    Discharge Diagnosis:    Hospital Problems  Date Reviewed: 4/26/2021            Codes Class Noted POA    Pneumonia due to COVID-19 virus ICD-10-CM: U07.1, J12.82  ICD-9-CM: 480.8, 079.89  8/8/2022 Yes        Acute on chronic diastolic heart failure ([facility] ICD-10-CM: I50.33  ICD-9-CM: 428.33  8/8/2022 Yes        Stage 3b chronic kidney disease ([facility] ICD-10-CM: N18.32  ICD-9-CM: 585.3  8/8/2022 Yes        * (Principal) Acute respiratory failure with hypoxia ([facility] ICD-10-CM: J96.01  ICD-9-CM: 518.81  10/18/2019 Yes        Type 2 diabetes mellitus with hyperglycemia (HCC) ICD-10-CM: E11.65  ICD-9-CM: 250.00  8/26/2019 Yes        Coronary artery disease involving native coronary artery of native heart without angina pectoris ICD-10-CM: I25.10  ICD-9-CM: 414.01  5/31/2018 Yes        Essential hypertension (Chronic) ICD-10-CM: I10  ICD-9-CM: 401.9  9/17/2015 Yes        Chronic obstructive pulmonary disease with acute exacerbation ([facility] ICD-10-CM: J44.1  ICD-9-CM: 491.21  9/1/2015 Yes           Discharge Condition: Stable  Discharge Disposition: Home. Patient refused HH     PHYSICAL EXAM  Visit Vitals  BP (!) 127/44 (BP Patient Position: Semi fowlers)   Pulse 78   Temp 98.8 °F (37.1 °C)   Resp 18   Ht 5' 4\" (1.626 m)   Wt 99.2 kg (218 lb 11.1 oz)   SpO2 100%   BMI 37.54 kg/m²       General: Alert, cooperative, no acute distress    HEENT: NC, Atraumatic. PERRLA, EOMI. Anicteric sclerae. Lungs:  CTA Bilaterally. Mild bilateral crackles at basees   Heart:  Regular  rhythm,  No murmur, No Rubs, No Gallops  Abdomen: Soft, Non distended, Non tender. +Bowel sounds, no HSM  Extremities: No c/c/e  Psych:   Good insight.  Not anxious or agitated. Neurologic:  CN 2-12 grossly intact, oriented X 3. No acute neurological    Deficits,     Hospital course:  Lisa Flaherty is a 81 y.o. female with a PMHx of HF, COPD, CKD3b who presented to the ED on 8/8/22 with SOB that had been present since she left the hospital in [facility] approximately 1 week ago. It is worse with lying flat. SOB was not improved with rescue inhalers. She was hospitalized on 7/26 due to \"not being able to breathe\" and was discharged on 7/30. She states they told her she had COVID, and they gave her an infusion for 3 or 4 days but does not remember what it was called. She also remembers that they gave her steroids, and sent her home on spironolactone. Per records - SPO2 of 88% in ED. Patient required bipap. She received nebulizer treatments, IV steroids, lasix, and IV abx in the ED. These were continued during her hospital stay. CTA neg for PE, but showed groundglass infiltrates and atelectasis vs consolidation in th RLL adjacent to a small effusion on right with similar findings on left. ID was consulted - they did not suggest continuing antibiotics. Cardiology was consulted. Advised to continue ASA, Lipitor, continue Coreg. Patient developed CARA. Likely from IV lasix. This was discontinued. Patient on 20 mg PO Lasix BID. This was halved at discharge. Patient with nephrology follow up on 8/23 which was scheduled before admission. Consults:   Cardiology - Jose Alejandro Alvarez   ID - Oanh Burns     Significant Diagnostic Studies: none     Discharge Medications:     Current Discharge Medication List        START taking these medications    Details   olodateroL (STRIVERDI) 2.5 mcg/actuation mist Take 2 Puffs by inhalation in the morning. Qty: 4 g, Refills: 0           CONTINUE these medications which have CHANGED    Details   furosemide (LASIX) 20 mg tablet Take 1 Tablet by mouth in the morning.   Qty: 60 Tablet, Refills: 0           CONTINUE these medications which have NOT CHANGED    Details   hydrALAZINE (APRESOLINE) 100 mg tablet Take 100 mg by mouth three (3) times daily. gabapentin (NEURONTIN) 400 mg capsule Take 400 mg by mouth in the morning. tiZANidine (ZANAFLEX) 4 mg tablet Take 4 mg by mouth two (2) times daily as needed for Muscle Spasm(s). calcitRIOL (ROCALTROL) 0.25 mcg capsule Take 1 Cap by mouth daily. Qty: 30 Cap, Refills: 0      umeclidinium-vilanterol (ANORO ELLIPTA) 62.5-25 mcg/actuation inhaler Take 1 Puff by inhalation daily. For COPD  Qty: 3 Inhaler, Refills: 3    Comments: 9/22/15 Dr Lynn Wells prescribed. Will order via [facility] if available  Associated Diagnoses: Chronic obstructive pulmonary disease, unspecified COPD type ([facility]      albuterol-ipratropium (DUO-NEB) 2.5 mg-0.5 mg/3 ml nebu 3 mL by Nebulization route every six (6) hours as needed for Wheezing. Qty: 30 Nebule, Refills: 0      atorvastatin (LIPITOR) 80 mg tablet Take 1 Tab by mouth daily. Qty: 90 Tab, Refills: 3    Associated Diagnoses: Dyslipidemia, goal LDL below 70      carvediloL (COREG) 25 mg tablet Take 1 Tab by mouth two (2) times daily (with meals). Qty: 180 Tab, Refills: 3    Associated Diagnoses: Essential hypertension; Chronic systolic congestive heart failure (HCC)      aspirin 81 mg chewable tablet Take 2 Tabs by mouth daily.   Qty: 60 Tab, Refills: 0    Associated Diagnoses: Coronary artery disease involving native coronary artery of native heart without angina pectoris      insulin detemir (LEVEMIR) 100 unit/mL injection 15 units daily for diabetes  Qty: 2 Vial, Refills: 0    Associated Diagnoses: Diabetes mellitus type 2, insulin dependent ([facility]           STOP taking these medications       dexAMETHasone (DECADRON) 0.5 mg tablet Comments:   Reason for Stopping:               Activity: activity as tolerated    Diet: Cardiac Diet    Wound Care: None needed    Follow-up: with PCP, Jaleesa Hernandez MD in 7-10days    Minutes spent on discharge: >30 minutes spent coordinating this discharge (review instructions/follow-up, prescriptions, preparing report for sign off)

## 2022-08-12 ENCOUNTER — PATIENT OUTREACH (OUTPATIENT)
Dept: CASE MANAGEMENT | Age: 72
End: 2022-08-12

## 2022-08-12 NOTE — PROGRESS NOTES
Care Transitions Initial Call    Call within 2 business days of discharge: Yes     Care Transition Nurse (CTN) attempted to contact the patient to perform post hospital discharge assessment. Unable to reach and unable to LVMM due to a full VM box. CTN contacted patient's  Carey Calzada, on [phone], and requested to speak with him or his wife to check on her and go over a few things. He states he is currently at work, so it would be best for CTN to speak with his wife. CTN explained that a call was attempted, but the phone ring twice and went to VM recording. He states the patient is probably sleeping and he will let her know that CTN called. Patient: Benjamín Whitehead Patient : 1950 MRN: [id number]    Last Discharge [address]       Date Complaint Diagnosis Description Type Department Provider    22 Shortness of Breath Acute respiratory failure with hypoxia ([facility] . .. ED to Hosp-Admission (Discharged) (ADMIT) Hansel Orf, [place] Jardo, DO; Karol Overcast. .. Patient was admitted to [facility] from 22 to 22 for acute hypoxic respiratory failure 2/2 COVID-19 PNA, CHF, and COPD. Was this an external facility discharge? No Discharge Facility: [facility]      COVID-19 related testing was available at this time. Test results were positive on 22 and 22. Advance Care Planning:   Does patient have an Advance Directive: not on file. Inpatient Readmission Risk score: Unplanned Readmit Risk Score: 28.1    Was this a readmission? Yes,   [facility] 22-22 ARF/Hyperkalemia  [facility] 22-22 CHF, pneumonitis  [facility] - INPATIENT  22-22 COVID-19, CHF, HTN, COPD        Home Health/Outpatient orders at discharge: refused    [facility] ordered at discharge: None      Was patient discharged with a pulse oximeter? Unable to assess    If no appointment was previously scheduled, appointment scheduling offered:  n/a .  Is follow up appointment scheduled within 7 days of discharge? N/a .   [facility] follow up appointment(s): No future appointments. Non-[facility] follow up appointment(s):   PCP Dr. Brie Lyman n/a  Cardiologist n/a    Plan for follow-up call in 1-2 days based on severity of symptoms and risk factors. Plan for next call:  Complete initial [facility] outreach  CTN provided contact information for future needs.

## 2022-08-15 ENCOUNTER — PATIENT OUTREACH (OUTPATIENT)
Dept: CASE MANAGEMENT | Age: 72
End: 2022-08-15

## 2022-08-15 NOTE — PROGRESS NOTES
Care Transitions Initial Call    Call within 2 business days of discharge: Yes     Care Transitions Nurse attempted to contact patient to complete transitions of care assessment and follow up. Unable to reach. Left [facility] requesting a return call. Resolving COVID Care Transitions episode. Patient: Charlie Castillo Patient : 1950 MRN: [id number]    Last Discharge  [address]       Date Complaint Diagnosis Description Type Department Provider    22 Shortness of Breath Acute respiratory failure with hypoxia ([address] . .. ED to Hosp-Admission (Discharged) (ADMIT) Vikash Coburn, [place] Jardo, DO; Emerson Nieto. .. Patient was admitted to [facility] from 22 to 22 for acute hypoxic respiratory failure 2/2 COVID-19 PNA, CHF, and COPD. Was this an external facility discharge? No Discharge Facility: [facility]      COVID-19 related testing was available at this time. Test results were positive. Advance Care Planning:   Does patient have an Advance Directive: not on file. Inpatient Readmission Risk score: Unplanned Readmit Risk Score: 28.1    Was this a readmission? Yes,  [facility] 22-22 ARF/Hyperkalemia  [facility] 22-22 CHF, pneumonitis  [facility] - INPATIENT  22-22 COVID-19, CHF, HTN, COPD         Home Health/Outpatient orders at discharge: refused    [facility] ordered at discharge: None      Was patient discharged with a pulse oximeter? Unable to assess    If no appointment was previously scheduled, appointment scheduling offered:  n/a . Is follow up appointment scheduled within 7 days of discharge? N/a .   [facility] follow up appointment(s): No future appointments.   Non-[facility] follow up appointment(s):   PCP Dr. Rolf Dangelo n/a  Cardiologist n/a  Nephrologist on 22

## 2022-09-05 ENCOUNTER — HOSPITAL ENCOUNTER (EMERGENCY)
Age: 72
Discharge: HOME | End: 2022-09-05
Attending: EMERGENCY MEDICINE
Payer: MEDICARE

## 2022-09-05 ENCOUNTER — APPOINTMENT (OUTPATIENT)
Dept: GENERAL RADIOLOGY | Age: 72
End: 2022-09-05
Attending: EMERGENCY MEDICINE
Payer: MEDICARE

## 2022-09-05 VITALS
TEMPERATURE: 98.5 F | SYSTOLIC BLOOD PRESSURE: 178 MMHG | WEIGHT: 218 LBS | RESPIRATION RATE: 25 BRPM | HEART RATE: 83 BPM | BODY MASS INDEX: 37.22 KG/M2 | OXYGEN SATURATION: 92 % | HEIGHT: 64 IN | DIASTOLIC BLOOD PRESSURE: 79 MMHG

## 2022-09-05 DIAGNOSIS — J44.1 COPD EXACERBATION (HCC): Primary | ICD-10-CM

## 2022-09-05 LAB
ALBUMIN SERPL-MCNC: 3.2 G/DL (ref 3.4–5)
ALBUMIN/GLOB SERPL: 1.1 {RATIO} (ref 0.8–1.7)
ALP SERPL-CCNC: 142 U/L (ref 45–117)
ALT SERPL-CCNC: 19 U/L (ref 13–56)
ANION GAP SERPL CALC-SCNC: 7 MMOL/L (ref 3–18)
AST SERPL-CCNC: 12 U/L (ref 10–38)
ATRIAL RATE: 88 BPM
BASOPHILS # BLD: 0 K/UL (ref 0–0.1)
BASOPHILS NFR BLD: 0 % (ref 0–2)
BILIRUB SERPL-MCNC: 0.3 MG/DL (ref 0.2–1)
BUN SERPL-MCNC: 28 MG/DL (ref 7–18)
BUN/CREAT SERPL: 18 (ref 12–20)
CALCIUM SERPL-MCNC: 8.8 MG/DL (ref 8.5–10.1)
CALCULATED P AXIS, ECG09: 70 DEGREES
CALCULATED R AXIS, ECG10: 20 DEGREES
CALCULATED T AXIS, ECG11: 100 DEGREES
CHLORIDE SERPL-SCNC: 111 MMOL/L (ref 100–111)
CO2 SERPL-SCNC: 22 MMOL/L (ref 21–32)
CREAT SERPL-MCNC: 1.52 MG/DL (ref 0.6–1.3)
DIAGNOSIS, 93000: NORMAL
DIFFERENTIAL METHOD BLD: ABNORMAL
EOSINOPHIL # BLD: 0.1 K/UL (ref 0–0.4)
EOSINOPHIL NFR BLD: 1 % (ref 0–5)
ERYTHROCYTE [DISTWIDTH] IN BLOOD BY AUTOMATED COUNT: 17.6 % (ref 11.6–14.5)
GLOBULIN SER CALC-MCNC: 3 G/DL (ref 2–4)
GLUCOSE SERPL-MCNC: 194 MG/DL (ref 74–99)
HCT VFR BLD AUTO: 30.7 % (ref 35–45)
HGB BLD-MCNC: 9.2 G/DL (ref 12–16)
IMM GRANULOCYTES # BLD AUTO: 0.1 K/UL (ref 0–0.04)
IMM GRANULOCYTES NFR BLD AUTO: 1 % (ref 0–0.5)
LYMPHOCYTES # BLD: 1.5 K/UL (ref 0.9–3.6)
LYMPHOCYTES NFR BLD: 21 % (ref 21–52)
MCH RBC QN AUTO: 28.8 PG (ref 24–34)
MCHC RBC AUTO-ENTMCNC: 30 G/DL (ref 31–37)
MCV RBC AUTO: 96.2 FL (ref 78–100)
MONOCYTES # BLD: 0.5 K/UL (ref 0.05–1.2)
MONOCYTES NFR BLD: 8 % (ref 3–10)
NEUTS SEG # BLD: 4.9 K/UL (ref 1.8–8)
NEUTS SEG NFR BLD: 70 % (ref 40–73)
NRBC # BLD: 0.09 K/UL (ref 0–0.01)
NRBC BLD-RTO: 1.3 PER 100 WBC
P-R INTERVAL, ECG05: 170 MS
PLATELET # BLD AUTO: 327 K/UL (ref 135–420)
PMV BLD AUTO: 9.6 FL (ref 9.2–11.8)
POTASSIUM SERPL-SCNC: 4.8 MMOL/L (ref 3.5–5.5)
PROT SERPL-MCNC: 6.2 G/DL (ref 6.4–8.2)
Q-T INTERVAL, ECG07: 360 MS
QRS DURATION, ECG06: 78 MS
QTC CALCULATION (BEZET), ECG08: 435 MS
RBC # BLD AUTO: 3.19 M/UL (ref 4.2–5.3)
SODIUM SERPL-SCNC: 140 MMOL/L (ref 136–145)
TROPONIN-HIGH SENSITIVITY: 20 NG/L (ref 0–54)
VENTRICULAR RATE, ECG03: 88 BPM
WBC # BLD AUTO: 7.1 K/UL (ref 4.6–13.2)

## 2022-09-05 PROCEDURE — 96374 THER/PROPH/DIAG INJ IV PUSH: CPT

## 2022-09-05 PROCEDURE — 94640 AIRWAY INHALATION TREATMENT: CPT

## 2022-09-05 PROCEDURE — 99285 EMERGENCY DEPT VISIT HI MDM: CPT

## 2022-09-05 PROCEDURE — 74011000250 HC RX REV CODE- 250: Performed by: EMERGENCY MEDICINE

## 2022-09-05 PROCEDURE — 93005 ELECTROCARDIOGRAM TRACING: CPT

## 2022-09-05 PROCEDURE — 74011250636 HC RX REV CODE- 250/636: Performed by: EMERGENCY MEDICINE

## 2022-09-05 PROCEDURE — 84484 ASSAY OF TROPONIN QUANT: CPT

## 2022-09-05 PROCEDURE — 71045 X-RAY EXAM CHEST 1 VIEW: CPT

## 2022-09-05 PROCEDURE — 80053 COMPREHEN METABOLIC PANEL: CPT

## 2022-09-05 PROCEDURE — 85025 COMPLETE CBC W/AUTO DIFF WBC: CPT

## 2022-09-05 RX ORDER — DOXYCYCLINE 100 MG/1
100 CAPSULE ORAL 2 TIMES DAILY
Qty: 14 CAPSULE | Refills: 0 | Status: SHIPPED | OUTPATIENT
Start: 2022-09-05 | End: 2022-09-22

## 2022-09-05 RX ORDER — PREDNISONE 50 MG/1
50 TABLET ORAL DAILY
Qty: 5 TABLET | Refills: 0 | Status: SHIPPED | OUTPATIENT
Start: 2022-09-05 | End: 2022-09-22

## 2022-09-05 RX ORDER — ALBUTEROL SULFATE 2.5 MG/.5ML
5 SOLUTION RESPIRATORY (INHALATION)
Status: COMPLETED | OUTPATIENT
Start: 2022-09-05 | End: 2022-09-05

## 2022-09-05 RX ADMIN — ALBUTEROL SULFATE 5 MG: 2.5 SOLUTION RESPIRATORY (INHALATION) at 10:31

## 2022-09-05 RX ADMIN — METHYLPREDNISOLONE SODIUM SUCCINATE 125 MG: 125 INJECTION, POWDER, FOR SOLUTION INTRAMUSCULAR; INTRAVENOUS at 09:13

## 2022-09-05 RX ADMIN — ALBUTEROL SULFATE 5 MG: 2.5 SOLUTION RESPIRATORY (INHALATION) at 09:13

## 2022-09-05 NOTE — ED NOTES
Vu Weiner is a 81 y.o. female that was discharged in stable condition. The patients diagnosis, condition and treatment were explained to  patient and aftercare instructions were given. The patient verbalized understanding. Patient armband removed and shredded.

## 2022-09-05 NOTE — ED PROVIDER NOTES
EMERGENCY DEPARTMENT HISTORY AND PHYSICAL EXAM    9:03 AM patient seen at this time in room 6      Date: 9/5/2022  Patient Name: Fariha Allen    History of Presenting Illness     Chief Complaint   Patient presents with    Chest Pain    Shortness of Breath         History Provided By: patient    Additional History (Context): Fariha Allen is a 81 y.o. female presents with 3 days of cough increasing shortness of breath characteristic of COPD. Initially albuterol is working but its not working anymore. Pain in the upper left shoulder going down the arm. Dyspnea with exertion. No orthopnea or weight gain. She does have cardiac stents and heart failure. Nataliya Russ PCP: Tamiko Gallegos MD    Chief Complaint:   Duration:    Timing:    Location:   Quality:   Severity:   Modifying Factors:   Associated Symptoms:       Current Facility-Administered Medications   Medication Dose Route Frequency Provider Last Rate Last Admin    albuterol CONCENTRATE 2.5mg/0.5 mL neb soln  5 mg Nebulization NOW Angelo Denton MD         Current Outpatient Medications   Medication Sig Dispense Refill    predniSONE (DELTASONE) 50 mg tablet Take 1 Tablet by mouth daily for 5 days. 5 Tablet 0    doxycycline (MONODOX) 100 mg capsule Take 1 Capsule by mouth two (2) times a day for 7 days. 14 Capsule 0    furosemide (LASIX) 20 mg tablet Take 1 Tablet by mouth in the morning. 60 Tablet 0    olodateroL (STRIVERDI) 2.5 mcg/actuation mist Take 2 Puffs by inhalation in the morning. 4 g 0    hydrALAZINE (APRESOLINE) 100 mg tablet Take 100 mg by mouth three (3) times daily. gabapentin (NEURONTIN) 400 mg capsule Take 400 mg by mouth in the morning. tiZANidine (ZANAFLEX) 4 mg tablet Take 4 mg by mouth two (2) times daily as needed for Muscle Spasm(s). calcitRIOL (ROCALTROL) 0.25 mcg capsule Take 1 Cap by mouth daily.  30 Cap 0    albuterol-ipratropium (DUO-NEB) 2.5 mg-0.5 mg/3 ml nebu 3 mL by Nebulization route every six (6) hours as needed for Wheezing. 30 Nebule 0    atorvastatin (LIPITOR) 80 mg tablet Take 1 Tab by mouth daily. 90 Tab 3    carvediloL (COREG) 25 mg tablet Take 1 Tab by mouth two (2) times daily (with meals). 180 Tab 3    aspirin 81 mg chewable tablet Take 2 Tabs by mouth daily. 60 Tab 0    insulin detemir (LEVEMIR) 100 unit/mL injection 15 units daily for diabetes 2 Vial 0    umeclidinium-vilanterol (ANORO ELLIPTA) 62.5-25 mcg/actuation inhaler Take 1 Puff by inhalation daily. For COPD 3 Inhaler 3       Past History     Past Medical History:  Past Medical History:   Diagnosis Date    Abnormal WBC count 2016    Anemia     Bilateral shoulder pain 2016    Chondromalacia of both patellae     COPD (chronic obstructive pulmonary disease) ([facility] [address] 2015    mild-mod dz; Dr Bryson Truong    Diabetes [facility])     Diabetic eye exam ([facility] [address]     Dilated cardiomyopathy ([facility] [address]     Dyslipidemia     Gout     Heart attack ([facility] [address] 10/18/2019    History of echocardiogram 2014    Mild LVE. EF 40%. Mild, diffuse hypk. Mild LAE. Mild MR.       Hypercholesteremia 14    Hypertension 2000    Noncompliance with medications 2016    Obesity     Orthostatic hypotension 2016    Osteoarthritis of both knees     Pain management 2016    Dr. Cárdenas Folds     Popliteal cyst, bilateral      Stage 3b chronic kidney disease ([facility] [address] 2022    Vitamin D deficiency 10/16/14       Past Surgical History:  Past Surgical History:   Procedure Laterality Date    HX HEART CATHETERIZATION      HX TUBAL LIGATION         Family History:  Family History   Problem Relation Age of Onset    Diabetes Mother     Hypertension Mother     Hypertension Father     Kidney Disease Maternal Aunt 48        Dialysis       Social History:  Social History     Tobacco Use    Smoking status: Former     Packs/day: 0.25     Years: 48.00     Pack years: 12.00     Types: Cigarettes     Quit date: 2018     Years since quittin.2    Smokeless tobacco: Never Substance Use Topics    Alcohol use: No     Alcohol/week: 0.0 standard drinks    Drug use: No       Allergies:  No Known Allergies      Review of Systems     Review of Systems   Constitutional:  Negative for diaphoresis and fever. HENT:  Negative for congestion and sore throat. Eyes:  Negative for pain and itching. Respiratory:  Positive for shortness of breath. Negative for cough. Cardiovascular:  Positive for chest pain. Negative for palpitations. Gastrointestinal:  Negative for abdominal pain and diarrhea. Endocrine: Negative for polydipsia and polyuria. Genitourinary:  Negative for dysuria and hematuria. Musculoskeletal:  Negative for arthralgias and myalgias. Skin:  Negative for rash and wound. Neurological:  Negative for seizures and syncope. Hematological:  Does not bruise/bleed easily. Psychiatric/Behavioral:  Negative for agitation and hallucinations. Physical Exam       Patient Vitals for the past 12 hrs:   Temp Pulse Resp BP SpO2   09/05/22 0830 -- 85 21 (!) 175/82 98 %   09/05/22 0759 98.5 °F (36.9 °C) 88 18 (!) 183/70 97 %       IPVITALS  Patient Vitals for the past 24 hrs:   BP Temp Pulse Resp SpO2 Height Weight   09/05/22 0830 (!) 175/82 -- 85 21 98 % -- --   09/05/22 0759 (!) 183/70 98.5 °F (36.9 °C) 88 18 97 % 5' 4\" (1.626 m) 98.9 kg (218 lb)       Physical Exam  Vitals and nursing note reviewed. Constitutional:       General: She is in acute distress (mild dyspnea). Appearance: She is well-developed. HENT:      Head: Normocephalic and atraumatic. Eyes:      General: No scleral icterus. Conjunctiva/sclera: Conjunctivae normal.   Neck:      Vascular: No JVD. Cardiovascular:      Rate and Rhythm: Normal rate and regular rhythm. Heart sounds: Normal heart sounds. Comments: 4 intact extremity pulses  Pulmonary:      Effort: Respiratory distress present. Breath sounds: Decreased air movement present. Wheezing present.    Abdominal: Palpations: Abdomen is soft. There is no mass. Tenderness: There is no abdominal tenderness. Musculoskeletal:         General: Normal range of motion. Cervical back: Normal range of motion and neck supple. Lymphadenopathy:      Cervical: No cervical adenopathy. Skin:     General: Skin is warm and dry. Neurological:      Mental Status: She is alert. Diagnostic Study Results   Labs -  Recent Results (from the past 24 hour(s))   EKG, 12 LEAD, INITIAL    Collection Time: 09/05/22  7:56 AM   Result Value Ref Range    Ventricular Rate 88 BPM    Atrial Rate 88 BPM    P-R Interval 170 ms    QRS Duration 78 ms    Q-T Interval 360 ms    QTC Calculation (Bezet) 435 ms    Calculated P Axis 70 degrees    Calculated R Axis 20 degrees    Calculated T Axis 100 degrees    Diagnosis       Normal sinus rhythm  Nonspecific T wave abnormality  Abnormal ECG  When compared with ECG of 10-AUG-2022 11:11,  No significant change was found     CBC WITH AUTOMATED DIFF    Collection Time: 09/05/22  8:03 AM   Result Value Ref Range    WBC 7.1 4.6 - 13.2 K/uL    RBC 3.19 (L) 4.20 - 5.30 M/uL    HGB 9.2 (L) 12.0 - 16.0 g/dL    HCT 30.7 (L) 35.0 - 45.0 %    MCV 96.2 78.0 - 100.0 FL    MCH 28.8 24.0 - 34.0 PG    MCHC 30.0 (L) 31.0 - 37.0 g/dL    RDW 17.6 (H) 11.6 - 14.5 %    PLATELET 733 294 - 906 K/uL    MPV 9.6 9.2 - 11.8 FL    NRBC 1.3 (H) 0  WBC    ABSOLUTE NRBC 0.09 (H) 0.00 - 0.01 K/uL    NEUTROPHILS 70 40 - 73 %    LYMPHOCYTES 21 21 - 52 %    MONOCYTES 8 3 - 10 %    EOSINOPHILS 1 0 - 5 %    BASOPHILS 0 0 - 2 %    IMMATURE GRANULOCYTES 1 (H) 0.0 - 0.5 %    ABS. NEUTROPHILS 4.9 1.8 - 8.0 K/UL    ABS. LYMPHOCYTES 1.5 0.9 - 3.6 K/UL    ABS. MONOCYTES 0.5 0.05 - 1.2 K/UL    ABS. EOSINOPHILS 0.1 0.0 - 0.4 K/UL    ABS. BASOPHILS 0.0 0.0 - 0.1 K/UL    ABS. IMM.  GRANS. 0.1 (H) 0.00 - 0.04 K/UL    DF AUTOMATED     METABOLIC PANEL, COMPREHENSIVE    Collection Time: 09/05/22  8:03 AM   Result Value Ref Range    Sodium 140 136 - 145 mmol/L    Potassium 4.8 3.5 - 5.5 mmol/L    Chloride 111 100 - 111 mmol/L    CO2 22 21 - 32 mmol/L    Anion gap 7 3.0 - 18 mmol/L    Glucose 194 (H) 74 - 99 mg/dL    BUN 28 (H) 7.0 - 18 MG/DL    Creatinine 1.52 (H) 0.6 - 1.3 MG/DL    BUN/Creatinine ratio 18 12 - 20      GFR est AA 41 (L) >60 ml/min/1.73m2    GFR est non-AA 34 (L) >60 ml/min/1.73m2    Calcium 8.8 8.5 - 10.1 MG/DL    Bilirubin, total 0.3 0.2 - 1.0 MG/DL    ALT (SGPT) 19 13 - 56 U/L    AST (SGOT) 12 10 - 38 U/L    Alk. phosphatase 142 (H) 45 - 117 U/L    Protein, total 6.2 (L) 6.4 - 8.2 g/dL    Albumin 3.2 (L) 3.4 - 5.0 g/dL    Globulin 3.0 2.0 - 4.0 g/dL    A-G Ratio 1.1 0.8 - 1.7     TROPONIN-HIGH SENSITIVITY    Collection Time: 09/05/22  8:03 AM   Result Value Ref Range    Troponin-High Sensitivity 20 0 - 54 ng/L       Radiologic Studies -   XR CHEST PORT    (Results Pending)     No results found. Medications ordered:   Medications   albuterol CONCENTRATE 2.5mg/0.5 mL neb soln (has no administration in time range)   albuterol CONCENTRATE 2.5mg/0.5 mL neb soln (5 mg Nebulization Given 9/5/22 0913)   methylPREDNISolone (PF) (Solu-MEDROL) injection 125 mg (125 mg IntraVENous Given 9/5/22 0913)         Medical Decision Making   Initial Medical Decision Making and DDx:  Twelve-lead EKG sinus rhythm at 88 narrow complex no acute process    Chest x-ray reviewed no change from prior note of persistent apparent right lower lobe process may be scarring. No evidence of ACS at this point 3 days of symptoms and negative single troponin is sufficient. Markedly decreased lung sounds and wheezing, will treat with albuterol steroids antibiotics, anticipate discharge. ED Course: Progress Notes, Reevaluation, and Consults:     10:31 AM Pt reevaluated at this time. Discussed results and findings, as well as, diagnosis and plan for discharge. Follow up with doctors/services listed. Return to the emergency department for alarming symptoms.   Pt verbalizes understanding and agreement with plan. All questions addressed. Still wheezing wants another neb will discharge with steroids and Doxy, likely phase 2 dominated by edema, will require time for steroids to work. I am the first provider for this patient. I reviewed the vital signs, available nursing notes, past medical history, past surgical history, family history and social history. Patient Vitals for the past 12 hrs:   Temp Pulse Resp BP SpO2   09/05/22 0830 -- 85 21 (!) 175/82 98 %   09/05/22 0759 98.5 °F (36.9 °C) 88 18 (!) 183/70 97 %       Vital Signs-Reviewed the patient's vital signs. Pulse Oximetry Analysis, Cardiac Monitor, 12 lead ekg:      Interpreted by the EP. Records Reviewed: Nursing notes reviewed (Time of Review: 9:03 AM)    Procedures:   Critical Care Time:   Aspirin: (was aspirin given for stroke?)    Diagnosis     Clinical Impression:   1. COPD exacerbation ([facility] [address]        Disposition: Discharged      Follow-up Information       Follow up With Specialties Details Why Contact Info    Omi Lau MD Internal Medicine Physician In 3 days  [address] [place] [address] [phone]               Patient's Medications   Start Taking    DOXYCYCLINE (MONODOX) 100 MG CAPSULE    Take 1 Capsule by mouth two (2) times a day for 7 days. PREDNISONE (DELTASONE) 50 MG TABLET    Take 1 Tablet by mouth daily for 5 days. Continue Taking    ALBUTEROL-IPRATROPIUM (DUO-NEB) 2.5 MG-0.5 MG/3 ML NEBU    3 mL by Nebulization route every six (6) hours as needed for Wheezing. ASPIRIN 81 MG CHEWABLE TABLET    Take 2 Tabs by mouth daily. ATORVASTATIN (LIPITOR) 80 MG TABLET    Take 1 Tab by mouth daily. CALCITRIOL (ROCALTROL) 0.25 MCG CAPSULE    Take 1 Cap by mouth daily. CARVEDILOL (COREG) 25 MG TABLET    Take 1 Tab by mouth two (2) times daily (with meals). FUROSEMIDE (LASIX) 20 MG TABLET    Take 1 Tablet by mouth in the morning.     GABAPENTIN (NEURONTIN) 400 MG CAPSULE    Take 400 mg by mouth in the morning. HYDRALAZINE (APRESOLINE) 100 MG TABLET    Take 100 mg by mouth three (3) times daily. INSULIN DETEMIR (LEVEMIR) 100 UNIT/ML INJECTION    15 units daily for diabetes    OLODATEROL (STRIVERDI) 2.5 MCG/ACTUATION MIST    Take 2 Puffs by inhalation in the morning. TIZANIDINE (ZANAFLEX) 4 MG TABLET    Take 4 mg by mouth two (2) times daily as needed for Muscle Spasm(s). UMECLIDINIUM-VILANTEROL (ANORO ELLIPTA) 62.5-25 MCG/ACTUATION INHALER    Take 1 Puff by inhalation daily.  For COPD   These Medications have changed    No medications on file   Stop Taking    No medications on file     _______________________________    Notes:    Rony Salcedo MD using Dragon dictation      _______________________________

## 2022-09-05 NOTE — ED TRIAGE NOTES
Patient c/o increased SOB with history of COPD. Not getting relief from using inhaler. She also c/o pain that starts in her left shoulder and goes all the way down her arm.

## 2022-09-09 ENCOUNTER — APPOINTMENT (OUTPATIENT)
Dept: GENERAL RADIOLOGY | Age: 72
DRG: 291 | End: 2022-09-09
Attending: EMERGENCY MEDICINE
Payer: MEDICARE

## 2022-09-09 ENCOUNTER — HOSPITAL ENCOUNTER (INPATIENT)
Age: 72
LOS: 13 days | Discharge: HOME | DRG: 291 | End: 2022-09-22
Attending: EMERGENCY MEDICINE | Admitting: INTERNAL MEDICINE
Payer: MEDICARE

## 2022-09-09 DIAGNOSIS — Z79.4 DIABETES MELLITUS TYPE 2, INSULIN DEPENDENT (HCC): ICD-10-CM

## 2022-09-09 DIAGNOSIS — E87.5 HYPERKALEMIA: ICD-10-CM

## 2022-09-09 DIAGNOSIS — J81.0 FLASH PULMONARY EDEMA (HCC): ICD-10-CM

## 2022-09-09 DIAGNOSIS — E11.9 DIABETES MELLITUS TYPE 2, INSULIN DEPENDENT (HCC): ICD-10-CM

## 2022-09-09 DIAGNOSIS — E66.01 MORBID OBESITY (HCC): ICD-10-CM

## 2022-09-09 DIAGNOSIS — R06.00 DYSPNEA, UNSPECIFIED TYPE: ICD-10-CM

## 2022-09-09 DIAGNOSIS — G47.33 OSA (OBSTRUCTIVE SLEEP APNEA): ICD-10-CM

## 2022-09-09 DIAGNOSIS — J81.0 ACUTE PULMONARY EDEMA (HCC): ICD-10-CM

## 2022-09-09 DIAGNOSIS — J44.1 CHRONIC OBSTRUCTIVE PULMONARY DISEASE WITH ACUTE EXACERBATION (HCC): ICD-10-CM

## 2022-09-09 DIAGNOSIS — J96.01 ACUTE RESPIRATORY FAILURE WITH HYPOXIA (HCC): Primary | ICD-10-CM

## 2022-09-09 DIAGNOSIS — R93.89 ABNORMAL CT OF THE CHEST: ICD-10-CM

## 2022-09-09 DIAGNOSIS — I50.33 ACUTE ON CHRONIC DIASTOLIC CONGESTIVE HEART FAILURE (HCC): ICD-10-CM

## 2022-09-09 DIAGNOSIS — I16.0 HYPERTENSIVE URGENCY: ICD-10-CM

## 2022-09-09 PROBLEM — I50.9 CHF (CONGESTIVE HEART FAILURE) (HCC): Status: ACTIVE | Noted: 2022-09-09

## 2022-09-09 LAB
ALBUMIN SERPL-MCNC: 3.3 G/DL (ref 3.4–5)
ALBUMIN/GLOB SERPL: 1 {RATIO} (ref 0.8–1.7)
ALP SERPL-CCNC: 116 U/L (ref 45–117)
ALT SERPL-CCNC: 25 U/L (ref 13–56)
ANION GAP SERPL CALC-SCNC: 6 MMOL/L (ref 3–18)
ARTERIAL PATENCY WRIST A: POSITIVE
AST SERPL-CCNC: 18 U/L (ref 10–38)
BASE DEFICIT BLD-SCNC: 3.6 MMOL/L
BASOPHILS # BLD: 0 K/UL (ref 0–0.1)
BASOPHILS NFR BLD: 0 % (ref 0–2)
BDY SITE: ABNORMAL
BILIRUB SERPL-MCNC: 0.5 MG/DL (ref 0.2–1)
BNP SERPL-MCNC: 2013 PG/ML (ref 0–900)
BUN SERPL-MCNC: 51 MG/DL (ref 7–18)
BUN/CREAT SERPL: 28 (ref 12–20)
CALCIUM SERPL-MCNC: 9.5 MG/DL (ref 8.5–10.1)
CHLORIDE SERPL-SCNC: 112 MMOL/L (ref 100–111)
CO2 SERPL-SCNC: 23 MMOL/L (ref 21–32)
CREAT SERPL-MCNC: 1.83 MG/DL (ref 0.6–1.3)
DIFFERENTIAL METHOD BLD: ABNORMAL
EOSINOPHIL # BLD: 0 K/UL (ref 0–0.4)
EOSINOPHIL NFR BLD: 0 % (ref 0–5)
ERYTHROCYTE [DISTWIDTH] IN BLOOD BY AUTOMATED COUNT: 17.7 % (ref 11.6–14.5)
GAS FLOW.O2 O2 DELIVERY SYS: ABNORMAL L/MIN
GAS FLOW.O2 SETTING OXYMISER: 19 BPM
GLOBULIN SER CALC-MCNC: 3.3 G/DL (ref 2–4)
GLUCOSE BLD STRIP.AUTO-MCNC: 198 MG/DL (ref 70–110)
GLUCOSE BLD STRIP.AUTO-MCNC: 287 MG/DL (ref 70–110)
GLUCOSE SERPL-MCNC: 110 MG/DL (ref 74–99)
HCO3 BLD-SCNC: 21 MMOL/L (ref 22–26)
HCT VFR BLD AUTO: 31.4 % (ref 35–45)
HGB BLD-MCNC: 9.7 G/DL (ref 12–16)
IMM GRANULOCYTES # BLD AUTO: 0.3 K/UL (ref 0–0.04)
IMM GRANULOCYTES NFR BLD AUTO: 3 % (ref 0–0.5)
INR PPP: 1 (ref 0.8–1.2)
LACTATE BLD-SCNC: 1.32 MMOL/L (ref 0.4–2)
LYMPHOCYTES # BLD: 2.2 K/UL (ref 0.9–3.6)
LYMPHOCYTES NFR BLD: 22 % (ref 21–52)
MCH RBC QN AUTO: 28.8 PG (ref 24–34)
MCHC RBC AUTO-ENTMCNC: 30.9 G/DL (ref 31–37)
MCV RBC AUTO: 93.2 FL (ref 78–100)
MONOCYTES # BLD: 0.9 K/UL (ref 0.05–1.2)
MONOCYTES NFR BLD: 9 % (ref 3–10)
NEUTS SEG # BLD: 6.7 K/UL (ref 1.8–8)
NEUTS SEG NFR BLD: 66 % (ref 40–73)
NRBC # BLD: 0.56 K/UL (ref 0–0.01)
NRBC BLD-RTO: 5.6 PER 100 WBC
O2/TOTAL GAS SETTING VFR VENT: 4 %
PCO2 BLD: 35.8 MMHG (ref 35–45)
PH BLD: 7.38 [PH] (ref 7.35–7.45)
PLATELET # BLD AUTO: 327 K/UL (ref 135–420)
PMV BLD AUTO: 9.3 FL (ref 9.2–11.8)
PO2 BLD: 95 MMHG (ref 80–100)
POTASSIUM SERPL-SCNC: 4.6 MMOL/L (ref 3.5–5.5)
PROCALCITONIN SERPL-MCNC: <0.05 NG/ML
PROT SERPL-MCNC: 6.6 G/DL (ref 6.4–8.2)
PROTHROMBIN TIME: 13.5 SEC (ref 11.5–15.2)
RBC # BLD AUTO: 3.37 M/UL (ref 4.2–5.3)
SAO2 % BLD: 97.3 % (ref 92–97)
SERVICE CMNT-IMP: ABNORMAL
SODIUM SERPL-SCNC: 141 MMOL/L (ref 136–145)
SPECIMEN TYPE: ABNORMAL
TROPONIN-HIGH SENSITIVITY: 50 NG/L (ref 0–54)
TROPONIN-HIGH SENSITIVITY: 51 NG/L (ref 0–54)
WBC # BLD AUTO: 10.1 K/UL (ref 4.6–13.2)

## 2022-09-09 PROCEDURE — 5A09357 ASSISTANCE WITH RESPIRATORY VENTILATION, LESS THAN 24 CONSECUTIVE HOURS, CONTINUOUS POSITIVE AIRWAY PRESSURE: ICD-10-PCS | Performed by: INTERNAL MEDICINE

## 2022-09-09 PROCEDURE — 84484 ASSAY OF TROPONIN QUANT: CPT

## 2022-09-09 PROCEDURE — 96375 TX/PRO/DX INJ NEW DRUG ADDON: CPT

## 2022-09-09 PROCEDURE — 77030035694 HC MSK BIPAP FLL FAC PERFMAX PHIL -B

## 2022-09-09 PROCEDURE — 99223 1ST HOSP IP/OBS HIGH 75: CPT | Performed by: INTERNAL MEDICINE

## 2022-09-09 PROCEDURE — 74011250637 HC RX REV CODE- 250/637: Performed by: INTERNAL MEDICINE

## 2022-09-09 PROCEDURE — 83605 ASSAY OF LACTIC ACID: CPT

## 2022-09-09 PROCEDURE — 74011636637 HC RX REV CODE- 636/637: Performed by: INTERNAL MEDICINE

## 2022-09-09 PROCEDURE — 36600 WITHDRAWAL OF ARTERIAL BLOOD: CPT

## 2022-09-09 PROCEDURE — 82803 BLOOD GASES ANY COMBINATION: CPT

## 2022-09-09 PROCEDURE — 85610 PROTHROMBIN TIME: CPT

## 2022-09-09 PROCEDURE — 71045 X-RAY EXAM CHEST 1 VIEW: CPT

## 2022-09-09 PROCEDURE — 84145 PROCALCITONIN (PCT): CPT

## 2022-09-09 PROCEDURE — 77010033678 HC OXYGEN DAILY

## 2022-09-09 PROCEDURE — 74011000258 HC RX REV CODE- 258: Performed by: INTERNAL MEDICINE

## 2022-09-09 PROCEDURE — 74011250637 HC RX REV CODE- 250/637: Performed by: EMERGENCY MEDICINE

## 2022-09-09 PROCEDURE — 94640 AIRWAY INHALATION TREATMENT: CPT

## 2022-09-09 PROCEDURE — 94660 CPAP INITIATION&MGMT: CPT

## 2022-09-09 PROCEDURE — 74011000258 HC RX REV CODE- 258: Performed by: EMERGENCY MEDICINE

## 2022-09-09 PROCEDURE — 93005 ELECTROCARDIOGRAM TRACING: CPT

## 2022-09-09 PROCEDURE — 96374 THER/PROPH/DIAG INJ IV PUSH: CPT

## 2022-09-09 PROCEDURE — 87040 BLOOD CULTURE FOR BACTERIA: CPT

## 2022-09-09 PROCEDURE — 85025 COMPLETE CBC W/AUTO DIFF WBC: CPT

## 2022-09-09 PROCEDURE — 82962 GLUCOSE BLOOD TEST: CPT

## 2022-09-09 PROCEDURE — 74011000250 HC RX REV CODE- 250: Performed by: INTERNAL MEDICINE

## 2022-09-09 PROCEDURE — 80053 COMPREHEN METABOLIC PANEL: CPT

## 2022-09-09 PROCEDURE — 36415 COLL VENOUS BLD VENIPUNCTURE: CPT

## 2022-09-09 PROCEDURE — 74011250636 HC RX REV CODE- 250/636: Performed by: EMERGENCY MEDICINE

## 2022-09-09 PROCEDURE — 74011250636 HC RX REV CODE- 250/636: Performed by: INTERNAL MEDICINE

## 2022-09-09 PROCEDURE — 74011000250 HC RX REV CODE- 250: Performed by: EMERGENCY MEDICINE

## 2022-09-09 PROCEDURE — 2709999900 HC NON-CHARGEABLE SUPPLY

## 2022-09-09 PROCEDURE — 99285 EMERGENCY DEPT VISIT HI MDM: CPT

## 2022-09-09 PROCEDURE — 83880 ASSAY OF NATRIURETIC PEPTIDE: CPT

## 2022-09-09 PROCEDURE — 65660000004 HC RM CVT STEPDOWN

## 2022-09-09 PROCEDURE — 94762 N-INVAS EAR/PLS OXIMTRY CONT: CPT

## 2022-09-09 RX ORDER — NITROGLYCERIN 0.4 MG/1
0.4 TABLET SUBLINGUAL
Status: COMPLETED | OUTPATIENT
Start: 2022-09-09 | End: 2022-09-09

## 2022-09-09 RX ORDER — OXYCODONE AND ACETAMINOPHEN 5; 325 MG/1; MG/1
1 TABLET ORAL
Status: DISCONTINUED | OUTPATIENT
Start: 2022-09-09 | End: 2022-09-22 | Stop reason: HOSPADM

## 2022-09-09 RX ORDER — GABAPENTIN 400 MG/1
400 CAPSULE ORAL DAILY
Status: DISCONTINUED | OUTPATIENT
Start: 2022-09-10 | End: 2022-09-22 | Stop reason: HOSPADM

## 2022-09-09 RX ORDER — ONDANSETRON 4 MG/1
4 TABLET, ORALLY DISINTEGRATING ORAL
Status: DISCONTINUED | OUTPATIENT
Start: 2022-09-09 | End: 2022-09-22 | Stop reason: HOSPADM

## 2022-09-09 RX ORDER — SODIUM CHLORIDE 0.9 % (FLUSH) 0.9 %
5-40 SYRINGE (ML) INJECTION AS NEEDED
Status: DISCONTINUED | OUTPATIENT
Start: 2022-09-09 | End: 2022-09-22 | Stop reason: HOSPADM

## 2022-09-09 RX ORDER — ENOXAPARIN SODIUM 100 MG/ML
40 INJECTION SUBCUTANEOUS DAILY
Status: DISCONTINUED | OUTPATIENT
Start: 2022-09-10 | End: 2022-09-15

## 2022-09-09 RX ORDER — INSULIN GLARGINE 100 [IU]/ML
7 INJECTION, SOLUTION SUBCUTANEOUS DAILY
Status: DISCONTINUED | OUTPATIENT
Start: 2022-09-10 | End: 2022-09-13

## 2022-09-09 RX ORDER — POLYETHYLENE GLYCOL 3350 17 G/17G
17 POWDER, FOR SOLUTION ORAL DAILY PRN
Status: DISCONTINUED | OUTPATIENT
Start: 2022-09-09 | End: 2022-09-22 | Stop reason: HOSPADM

## 2022-09-09 RX ORDER — NALOXONE HYDROCHLORIDE 0.4 MG/ML
0.4 INJECTION, SOLUTION INTRAMUSCULAR; INTRAVENOUS; SUBCUTANEOUS
Status: DISCONTINUED | OUTPATIENT
Start: 2022-09-09 | End: 2022-09-22 | Stop reason: HOSPADM

## 2022-09-09 RX ORDER — SODIUM CHLORIDE 0.9 % (FLUSH) 0.9 %
5-40 SYRINGE (ML) INJECTION EVERY 8 HOURS
Status: DISCONTINUED | OUTPATIENT
Start: 2022-09-09 | End: 2022-09-22 | Stop reason: HOSPADM

## 2022-09-09 RX ORDER — ONDANSETRON 2 MG/ML
4 INJECTION INTRAMUSCULAR; INTRAVENOUS
Status: DISCONTINUED | OUTPATIENT
Start: 2022-09-09 | End: 2022-09-22 | Stop reason: HOSPADM

## 2022-09-09 RX ORDER — MAGNESIUM SULFATE 100 %
16 CRYSTALS MISCELLANEOUS AS NEEDED
Status: DISCONTINUED | OUTPATIENT
Start: 2022-09-09 | End: 2022-09-22 | Stop reason: HOSPADM

## 2022-09-09 RX ORDER — INSULIN LISPRO 100 [IU]/ML
INJECTION, SOLUTION INTRAVENOUS; SUBCUTANEOUS
Status: DISCONTINUED | OUTPATIENT
Start: 2022-09-09 | End: 2022-09-22 | Stop reason: HOSPADM

## 2022-09-09 RX ORDER — DEXTROSE MONOHYDRATE 100 MG/ML
0-250 INJECTION, SOLUTION INTRAVENOUS AS NEEDED
Status: DISCONTINUED | OUTPATIENT
Start: 2022-09-09 | End: 2022-09-22 | Stop reason: HOSPADM

## 2022-09-09 RX ORDER — ACETAMINOPHEN 325 MG/1
650 TABLET ORAL
Status: DISCONTINUED | OUTPATIENT
Start: 2022-09-09 | End: 2022-09-22 | Stop reason: HOSPADM

## 2022-09-09 RX ORDER — ATORVASTATIN CALCIUM 40 MG/1
40 TABLET, FILM COATED ORAL
Status: DISCONTINUED | OUTPATIENT
Start: 2022-09-09 | End: 2022-09-22 | Stop reason: HOSPADM

## 2022-09-09 RX ORDER — NITROGLYCERIN 40 MG/100ML
5 INJECTION INTRAVENOUS CONTINUOUS
Status: ACTIVE | OUTPATIENT
Start: 2022-09-09 | End: 2022-09-11

## 2022-09-09 RX ORDER — AMLODIPINE BESYLATE 10 MG/1
10 TABLET ORAL DAILY
Status: DISCONTINUED | OUTPATIENT
Start: 2022-09-10 | End: 2022-09-14

## 2022-09-09 RX ORDER — FUROSEMIDE 10 MG/ML
40 INJECTION INTRAMUSCULAR; INTRAVENOUS 2 TIMES DAILY
Status: DISCONTINUED | OUTPATIENT
Start: 2022-09-09 | End: 2022-09-11

## 2022-09-09 RX ORDER — IPRATROPIUM BROMIDE AND ALBUTEROL SULFATE 2.5; .5 MG/3ML; MG/3ML
3 SOLUTION RESPIRATORY (INHALATION)
Status: DISCONTINUED | OUTPATIENT
Start: 2022-09-09 | End: 2022-09-12 | Stop reason: SDUPTHER

## 2022-09-09 RX ORDER — FUROSEMIDE 10 MG/ML
40 INJECTION INTRAMUSCULAR; INTRAVENOUS
Status: COMPLETED | OUTPATIENT
Start: 2022-09-09 | End: 2022-09-09

## 2022-09-09 RX ORDER — HYDRALAZINE HYDROCHLORIDE 50 MG/1
100 TABLET, FILM COATED ORAL 3 TIMES DAILY
Status: DISCONTINUED | OUTPATIENT
Start: 2022-09-09 | End: 2022-09-14

## 2022-09-09 RX ORDER — GUAIFENESIN 100 MG/5ML
81 LIQUID (ML) ORAL DAILY
Status: DISCONTINUED | OUTPATIENT
Start: 2022-09-10 | End: 2022-09-22 | Stop reason: HOSPADM

## 2022-09-09 RX ORDER — FUROSEMIDE 20 MG/1
20 TABLET ORAL DAILY
Status: DISCONTINUED | OUTPATIENT
Start: 2022-09-09 | End: 2022-09-13

## 2022-09-09 RX ORDER — CARVEDILOL 25 MG/1
25 TABLET ORAL 2 TIMES DAILY WITH MEALS
Status: DISCONTINUED | OUTPATIENT
Start: 2022-09-09 | End: 2022-09-14

## 2022-09-09 RX ORDER — IPRATROPIUM BROMIDE AND ALBUTEROL SULFATE 2.5; .5 MG/3ML; MG/3ML
6 SOLUTION RESPIRATORY (INHALATION)
Status: COMPLETED | OUTPATIENT
Start: 2022-09-09 | End: 2022-09-09

## 2022-09-09 RX ORDER — HYDRALAZINE HYDROCHLORIDE 50 MG/1
100 TABLET, FILM COATED ORAL 3 TIMES DAILY
Status: DISCONTINUED | OUTPATIENT
Start: 2022-09-09 | End: 2022-09-09 | Stop reason: DRUGHIGH

## 2022-09-09 RX ORDER — IPRATROPIUM BROMIDE AND ALBUTEROL SULFATE 2.5; .5 MG/3ML; MG/3ML
3 SOLUTION RESPIRATORY (INHALATION)
Status: COMPLETED | OUTPATIENT
Start: 2022-09-09 | End: 2022-09-09

## 2022-09-09 RX ORDER — HYDRALAZINE HYDROCHLORIDE 50 MG/1
100 TABLET, FILM COATED ORAL 2 TIMES DAILY
Status: DISCONTINUED | OUTPATIENT
Start: 2022-09-09 | End: 2022-09-09

## 2022-09-09 RX ORDER — NITROGLYCERIN 40 MG/100ML
0-200 INJECTION INTRAVENOUS
Status: DISCONTINUED | OUTPATIENT
Start: 2022-09-09 | End: 2022-09-09

## 2022-09-09 RX ADMIN — DOXYCYCLINE 100 MG: 100 INJECTION, POWDER, LYOPHILIZED, FOR SOLUTION INTRAVENOUS at 10:54

## 2022-09-09 RX ADMIN — CARVEDILOL 25 MG: 25 TABLET, FILM COATED ORAL at 17:32

## 2022-09-09 RX ADMIN — HYDRALAZINE HYDROCHLORIDE 100 MG: 50 TABLET, FILM COATED ORAL at 21:22

## 2022-09-09 RX ADMIN — Medication 0.4 MG: at 09:09

## 2022-09-09 RX ADMIN — SODIUM CHLORIDE, PRESERVATIVE FREE 10 ML: 5 INJECTION INTRAVENOUS at 21:22

## 2022-09-09 RX ADMIN — WATER 2 G: 1 INJECTION INTRAMUSCULAR; INTRAVENOUS; SUBCUTANEOUS at 10:50

## 2022-09-09 RX ADMIN — FUROSEMIDE 40 MG: 10 INJECTION, SOLUTION INTRAMUSCULAR; INTRAVENOUS at 09:09

## 2022-09-09 RX ADMIN — IPRATROPIUM BROMIDE AND ALBUTEROL SULFATE 3 ML: .5; 3 SOLUTION RESPIRATORY (INHALATION) at 09:09

## 2022-09-09 RX ADMIN — OXYCODONE HYDROCHLORIDE AND ACETAMINOPHEN 1 TABLET: 5; 325 TABLET ORAL at 21:55

## 2022-09-09 RX ADMIN — NITROGLYCERIN 10 MCG/MIN: 40 INJECTION INTRAVENOUS at 10:03

## 2022-09-09 RX ADMIN — FUROSEMIDE 40 MG: 10 INJECTION, SOLUTION INTRAMUSCULAR; INTRAVENOUS at 17:32

## 2022-09-09 RX ADMIN — ACETAMINOPHEN 650 MG: 325 TABLET, FILM COATED ORAL at 20:02

## 2022-09-09 RX ADMIN — IPRATROPIUM BROMIDE AND ALBUTEROL SULFATE 6 ML: .5; 3 SOLUTION RESPIRATORY (INHALATION) at 08:31

## 2022-09-09 RX ADMIN — Medication 0.4 MG: at 10:02

## 2022-09-09 RX ADMIN — NITROGLYCERIN 20 MCG/MIN: 40 INJECTION INTRAVENOUS at 10:50

## 2022-09-09 RX ADMIN — DOXYCYCLINE 100 MG: 100 INJECTION, POWDER, LYOPHILIZED, FOR SOLUTION INTRAVENOUS at 20:02

## 2022-09-09 RX ADMIN — Medication 6 UNITS: at 21:36

## 2022-09-09 RX ADMIN — NITROGLYCERIN 20 MCG/MIN: 40 INJECTION INTRAVENOUS at 11:26

## 2022-09-09 RX ADMIN — ATORVASTATIN CALCIUM 40 MG: 40 TABLET, FILM COATED ORAL at 21:22

## 2022-09-09 RX ADMIN — SODIUM CHLORIDE, PRESERVATIVE FREE 10 ML: 5 INJECTION INTRAVENOUS at 17:33

## 2022-09-09 RX ADMIN — HYDRALAZINE HYDROCHLORIDE 100 MG: 50 TABLET, FILM COATED ORAL at 17:32

## 2022-09-09 NOTE — PROGRESS NOTES
TRANSFER - IN REPORT:    Verbal report received from [facility] RN(name) on Candace Akbar  being received from ED(unit) for routine progression of care      Report consisted of patients Situation, Background, Assessment and   Recommendations(SBAR). Information from the following report(s) SBAR was reviewed with the receiving nurse. Opportunity for questions and clarification was provided. 1610 Assessment completed upon patients arrival to unit and care assumed.

## 2022-09-09 NOTE — CONSULTS
Cardiology Initial Patient Referral Note    Cardiology referral request from Dr. Lucero Valenzuela for evaluation and management/treatment of CHF    Date of  Admission: 9/9/2022  7:40 AM   Primary Care Physician:  Chencho Lanza MD    Attending Cardiologist: Dr. Nicole Miranda:     -Acute hypoxic respiratory failure in setting of below, on bipap.   -Flash pulmonary edema in setting of severely elevated blood pressures.   -Chest pain, atypical with pleuritic component. Initial troponin negative. No acute ischemic changes on ECG. -Hypertensive emergency, Max /121 mmHg. -a/c HFpEF. Last Echo in June 2022 with normal LVEF.   -CAD, s/p cardiac cath 10/2019 with findings as follows:  LM: Angiographically normal  LAD: Previously placed ostial to proximal LAD stent is widely patent. D1 with ostial 50% stenosis. -CKD  -Anemia  -HTN  -DM  -Hypercholesterolemia, on statin  -COPD      Primary cardiologist is Dr. Lida Burns:     -Continued on IV nitro gtt, resume po medications once able to tolerate and wean drip as able. -Patient taking hydralazine 100 mg BID, would increase frequency to TID. Start po Amlodipine.   -Continue pulse IV diuretics with close monitoring of renal function. Monitor strict I/Os and electrolytes. -trend cardiac biomarkers.   -Continue ASA and statin for CAD. -Further recommendations pending hospital course/test results. ------------------------------------------  This is a 68-year-old female admitted with acute hypoxic respiratory failure in the setting of hypertension. She is requiring BiPAP. Plan to resume oral medications and titrate nitro drip as needed. Plan IV diuretics as well. Trend cardiac markers. She has atypical chest pain.   She has a history of LAD stent in 2019 and 50% first diagonal stenosis in the past.    I saw, examined, and evaluated this patient and performed the substantive portion of the encounter for > 50% of the time including extensive history, physical exam, and medical decision making as discussed with patient and next-of-kin as needed. I personally reviewed the patient's labs, tests, vitals, orders, medications, updated history, and other providers assessments as well. I personally agree with the findings as stated and the plan as documented. Ten Arora MD       History of Present Illness: This is a 81 y.o. female admitted for Acute respiratory failure with hypoxia ([place] [address] [J96.01]  CHF (congestive heart failure) ([place] [address] [I50.9]. Patient complains of: SOB   Lisa Flaherty is a 81 y.o. female, pmhx as stated above, who we are seeing for CHF. Patient presented with worsening SOB since last night. Patient states she has been compliant with her BP medications but still her pressures remain elevated at home. She states she had some mild chest discomfort when she was severely SOB this am, but has resolved since she arrived. She also endorses lower extremity swelling and orthopnea. She reports compliance with her diuretics. Denies N/V/D, diaphoresis, dizziness, near syncope or syncope. Cardiac risk factors: dyslipidemia, diabetes mellitus, obesity, hypertension, post-menopausal    Review of Symptoms:  Except as stated above include:  Constitutional:  negative  Respiratory:  negative  Cardiovascular:  negative  Gastrointestinal: negative  Genitourinary:  negative  Musculoskeletal:  Negative  Neurological:  Negative  Dermatological:  Negative  Endocrinological: Negative  Psychological:  Negative    A comprehensive review of systems was negative except for that written in the HPI.      Past Medical History:     Past Medical History:   Diagnosis Date    Abnormal WBC count 5/17/2016    Anemia     Bilateral shoulder pain 9/27/2016    Chondromalacia of both patellae     COPD (chronic obstructive pulmonary disease) ([facility] 9/2015    mild-mod dz; Dr [facility]    Diabetes [facility]) 2013    Diabetic eye exam ([facility] 2016    Dilated cardiomyopathy ([facility] Dyslipidemia     Gout     Heart attack ([facility] 10/18/2019    History of echocardiogram 2014    Mild LVE. EF 40%. Mild, diffuse hypk. Mild LAE. Mild MR.       Hypercholesteremia 14    Hypertension 2000    Noncompliance with medications 2016    Obesity     Orthostatic hypotension 2016    Osteoarthritis of both knees     Pain management 2016    Dr. Herlinda Doherty     Popliteal cyst, bilateral      Stage 3b chronic kidney disease ([facility] 2022    Vitamin D deficiency 10/16/14         Social History:     Social History     Socioeconomic History    Marital status:     Number of children: 3   Occupational History    Occupation: retired   Tobacco Use    Smoking status: Former     Packs/day: 0.25     Years: 48.00     Pack years: 12.00     Types: Cigarettes     Quit date: 2018     Years since quittin.3    Smokeless tobacco: Never   Substance and Sexual Activity    Alcohol use: No     Alcohol/week: 0.0 standard drinks    Drug use: No    Sexual activity: Yes     Partners: Male   Other Topics Concern     Service No    Blood Transfusions No    Caffeine Concern No    Occupational Exposure No    Hobby Hazards No    Sleep Concern No    Stress Concern No    Weight Concern No    Special Diet No    Back Care No    Exercise No    Bike Helmet No    Seat Belt Yes    Self-Exams Yes        Family History:     Family History   Problem Relation Age of Onset    Diabetes Mother     Hypertension Mother     Hypertension Father     Kidney Disease Maternal Aunt 50        Dialysis        Medications:   No Known Allergies     Current Facility-Administered Medications   Medication Dose Route Frequency    cefTRIAXone (ROCEPHIN) 2 g in sterile water (preservative free) 20 mL IV syringe  2 g IntraVENous Q24H    doxycycline (VIBRAMYCIN) 100 mg in 0.9% sodium chloride (MBP/ADV) 100 mL MBP  100 mg IntraVENous Q12H    albuterol-ipratropium (DUO-NEB) 2.5 MG-0.5 MG/3 ML  3 mL Nebulization Q6H PRN    carvediloL (COREG) tablet 25 mg  25 mg Oral BID WITH MEALS    furosemide (LASIX) tablet 20 mg  20 mg Oral DAILY    nitroGLYcerin (TRIDIL) 400 mcg/ml infusion  20 mcg/min IntraVENous CONTINUOUS    hydrALAZINE (APRESOLINE) tablet 100 mg  100 mg Oral BID     Current Outpatient Medications   Medication Sig    predniSONE (DELTASONE) 50 mg tablet Take 1 Tablet by mouth daily for 5 days. doxycycline (MONODOX) 100 mg capsule Take 1 Capsule by mouth two (2) times a day for 7 days. furosemide (LASIX) 20 mg tablet Take 1 Tablet by mouth in the morning. olodateroL (STRIVERDI) 2.5 mcg/actuation mist Take 2 Puffs by inhalation in the morning. hydrALAZINE (APRESOLINE) 100 mg tablet Take 100 mg by mouth three (3) times daily. gabapentin (NEURONTIN) 400 mg capsule Take 400 mg by mouth in the morning. tiZANidine (ZANAFLEX) 4 mg tablet Take 4 mg by mouth two (2) times daily as needed for Muscle Spasm(s). calcitRIOL (ROCALTROL) 0.25 mcg capsule Take 1 Cap by mouth daily. albuterol-ipratropium (DUO-NEB) 2.5 mg-0.5 mg/3 ml nebu 3 mL by Nebulization route every six (6) hours as needed for Wheezing. atorvastatin (LIPITOR) 80 mg tablet Take 1 Tab by mouth daily. carvediloL (COREG) 25 mg tablet Take 1 Tab by mouth two (2) times daily (with meals). aspirin 81 mg chewable tablet Take 2 Tabs by mouth daily. insulin detemir (LEVEMIR) 100 unit/mL injection 15 units daily for diabetes    umeclidinium-vilanterol (ANORO ELLIPTA) 62.5-25 mcg/actuation inhaler Take 1 Puff by inhalation daily.  For COPD         Physical Exam:   Visit Vitals  BP (!) 196/87   Pulse 67   Temp 98.8 °F (37.1 °C)   Resp 18   Ht 5' 4\" (1.626 m)   Wt 110.7 kg (244 lb)   SpO2 99%   BMI 41.88 kg/m²       TELE: normal sinus rhythm    BP Readings from Last 3 Encounters:   09/09/22 (!) 196/87   09/05/22 (!) 178/79   08/11/22 112/71     Pulse Readings from Last 3 Encounters:   09/09/22 67   09/05/22 83   08/11/22 74     Wt Readings from Last 3 Encounters: 09/09/22 110.7 kg (244 lb)   09/05/22 98.9 kg (218 lb)   08/11/22 99.2 kg (218 lb 11.1 oz)       General:  alert, cooperative, mild distress, appears stated age  Neck:  no JVD  Lungs:  few bibasilar rales   Heart:  RRR  Abdomen:  abdomen is soft without significant tenderness, masses, organomegaly or guarding  Extremities:  trace B/L LE edema   Skin: Warm and dry. no hyperpigmentation, vitiligo, or suspicious lesions  Neuro: alert, oriented x3, affect appropriate  Psych: non focal     Data Review:     Recent Labs     09/09/22  0734   WBC 10.1   HGB 9.7*   HCT 31.4*        Recent Labs     09/09/22  0754 09/09/22  0734   NA  --  141   K  --  4.6   CL  --  112*   CO2  --  23   GLU  --  110*   BUN  --  51*   CREA  --  1.83*   CA  --  9.5   ALB  --  3.3*   ALT  --  25   INR 1.0  --        Results for orders placed or performed during the hospital encounter of 09/09/22   EKG, 12 LEAD, INITIAL   Result Value Ref Range    Ventricular Rate 99 BPM    Atrial Rate 99 BPM    P-R Interval 156 ms    QRS Duration 82 ms    Q-T Interval 340 ms    QTC Calculation (Bezet) 436 ms    Calculated P Axis 70 degrees    Calculated R Axis 28 degrees    Calculated T Axis 78 degrees    Diagnosis       Normal sinus rhythm  Minimal voltage criteria for LVH, may be normal variant ( [place] product )  Borderline ECG  When compared with ECG of 05-SEP-2022 07:56,  No significant change was found     Results for orders placed or performed in visit on 08/23/18   AMB POC EKG ROUTINE W/ 12 LEADS, INTER & REP    Impression    See progress note.        All Cardiac Markers in the last 24 hours:  No results found for: CPK, CK, CKMMB, CKMB, RCK3, CKMBT, CKNDX, CKND1, GODFREY, TROPT, TROIQ, SANG, TROPT, TNIPOC, BNP, BNPP    Last Lipid:    Lab Results   Component Value Date/Time    Cholesterol, total 152 10/10/2019 12:00 AM    HDL Cholesterol 46 10/10/2019 12:00 AM    LDL, calculated 86 10/10/2019 12:00 AM    Triglyceride 101 10/10/2019 12:00 AM    CHOL/HDL Ratio 5.6 (H) 05/21/2018 06:23 AM       Cardiographics:     EKG Results       Procedure 720 Value Units Date/Time    EKG, 12 LEAD, INITIAL [[id number]] Collected: 09/09/22 0804    Order Status: Completed Updated: 09/09/22 0804     Ventricular Rate 99 BPM      Atrial Rate 99 BPM      P-R Interval 156 ms      QRS Duration 82 ms      Q-T Interval 340 ms      QTC Calculation (Bezet) 436 ms      Calculated P Axis 70 degrees      Calculated R Axis 28 degrees      Calculated T Axis 78 degrees      Diagnosis --     Normal sinus rhythm  Minimal voltage criteria for LVH, may be normal variant ( [place] product )  Borderline ECG  When compared with ECG of 05-SEP-2022 07:56,  No significant change was found            06/25/22    ECHO ADULT COMPLETE 06/29/2022 6/29/2022    Interpretation Summary  Formatting of this result is different from the original.      Left Ventricle: Mildly reduced left ventricular systolic function with a visually estimated EF of 50 - 55%. Left ventricle is mildly dilated. Increased wall thickness. Findings consistent with mild concentric hypertrophy. See diagram for wall motion findings. Diastolic dysfunction present with normal LV EF. Mitral Valve: Mildly thickened leaflet. Mild regurgitation with a centrally directed jet. Signed by: Luly Neil MD on 6/29/2022 12:12 PM      08/25/19    NUCLEAR CARDIAC STRESS TEST 08/28/2019 8/28/2019    Interpretation Summary  · Baseline ECG: Normal sinus rhythm, occasional PACs. · Gated SPECT: Left ventricular function post-stress was abnormal. Calculated ejection fraction is 44%. The TID ratio is 0.94. · Negative stress test.  · Left ventricular perfusion is abnormal.  · Myocardial perfusion imaging defect 1: There is a defect that is moderate in size with a severe reduction in uptake present in the mid-apical anterior and apex location(s) that is non-reversible. There is abnormal wall motion in the defect area. Viability in the area is poor.  The defect appears to be infarction. Perfusion defect was visually present without quantitative evidence. · Positive myocardial perfusion imaging. Myocardial perfusion imaging supports an intermediate risk stress test.    Signed by: Leopold Roup, MD on 8/28/2019  3:08 PM    10/18/19    CARDIAC PROCEDURE 10/25/2019 10/25/2019    Conclusion  Patent stent in proximal LAD. Mildly reduced LV function. Continue intense risk factor modification. Signed by: Aldair Garcia MD on 10/25/2019  1:50 PM      XR Results (most recent):  Results from [place] encounter on 09/09/22    XR CHEST PORT    Narrative  EXAM:  XR CHEST PORT    INDICATION:   SOB    COMPARISON: 9/5/2022. FINDINGS:  Stable mildly enlarged cardiac silhouette. Improved pulmonary vascular  congestion. Aortic atherosclerosis. Background of mildly increased interstitial  prominence. Right basilar hazy airspace opacity silhouetting vasculatures. No  confluent consolidation. No pneumothorax or pleural effusion. Intact osseous  structures. Impression  Stable enlarged cardiac silhouette with improved pulmonary vascular congestion. Right basilar infiltrates that could represent pneumonia versus asymmetric  edema.         Signed By: Toro Whitman PA-C     September 9, 2022

## 2022-09-09 NOTE — ACP (ADVANCE CARE PLANNING)
Advance Care Planning   Advance Care Planning Inpatient Note  [address] [place] [address]   Spiritual Care Department    Today's Date: 9/9/2022  Unit: [facility] EMERGENCY DEPT    Received request from . Upon review of chart and communication with care team, patient's decision making abilities are not in question. Patient was/were present in the room during visit. Goals of ACP Conversation:  Discuss Advance Care planning documents    Health Care Decision Makers:      Primary Decision Maker: Alexander Garcia - [phone]    Summary:  [place] (Patient Wishes) on file:  Healthcare Power of /Advance Directive appointment of Health care agent     Assessment:    Patient seen in the emergency room bed 10 where she will be admitted due to respiratory distress. Patient currently on the c-pap machine. Asked about her having a advance directive and she said only at the doctors office. When I asked her to get us a copy she said she would rather do another one for here. Patient completed a medical power of . Interventions:  Assisted in the completion of documents according to patient's wishes at this time    Care Preferences Communicated:  No    Outcomes/Plan:  There is now a Medical Power of  present.     LESLIE Gonzalez on 9/9/2022 at 1:31 PM

## 2022-09-09 NOTE — H&P
Hospitalist Admission History and Physical    NAME:  Rafiq Spann   :   1950   MRN:   [id number]     PCP:  Faby Hall MD  Date/Time:  2022 4:47 PM  Subjective:   CHIEF COMPLAINT:  shortness of breath    HISTORY OF PRESENT ILLNESS:     Gabby Gruber is a 81 y.o.   female with history of diastolic heart failure, covid pna some time in August of this year, COPD who presents with shortness of breath onset yesterday evening. Pt reports having orthopnea and bilateral lower ext edema. Also c/o sharp chest pain that is substernal and radiates to her back that she describes is constant. No reports of associated fevers, chills, cough. She was comfortable at the time of my eval.    In the ED she is noted to have very elevated bp, and intermittent tachypnea, initially placed on BiPAP and had transitioned to 02 via NC at the time of my eval. She was started on nitro gtt and remained on the nitro at the time of my eval. WBC was nl, stable anemia, elevated BNP. We have been asked to see pt for further eval and management. Past Medical History:   Diagnosis Date    Abnormal WBC count 2016    Anemia     Bilateral shoulder pain 2016    Chondromalacia of both patellae     COPD (chronic obstructive pulmonary disease) ([facility] 2015    mild-mod dz; Dr Onel Herrera    Diabetes [facility])     Diabetic eye exam ([facility]     Dilated cardiomyopathy ([facility]     Dyslipidemia     Gout     Heart attack ([facility] 10/18/2019    History of echocardiogram 2014    Mild LVE. EF 40%. Mild, diffuse hypk. Mild LAE. Mild MR.       Hypercholesteremia 14    Hypertension 2000    Noncompliance with medications 2016    Obesity     Orthostatic hypotension 2016    Osteoarthritis of both knees     Pain management 2016    Dr. Swapna Fenton     Popliteal cyst, bilateral      Stage 3b chronic kidney disease ([facility] 2022    Vitamin D deficiency 10/16/14        Past Surgical History:   Procedure Laterality Date HX HEART CATHETERIZATION      HX TUBAL LIGATION         Social History     Tobacco Use    Smoking status: Former     Packs/day: 0.25     Years: 48.00     Pack years: 12.00     Types: Cigarettes     Quit date: 2018     Years since quittin.3    Smokeless tobacco: Never   Substance Use Topics    Alcohol use: No     Alcohol/week: 0.0 standard drinks        Family History   Problem Relation Age of Onset    Diabetes Mother     Hypertension Mother     Hypertension Father     Kidney Disease Maternal Aunt 48        Dialysis        No Known Allergies     Prior to Admission Medications   Prescriptions Last Dose Informant Patient Reported? Taking? albuterol-ipratropium (DUO-NEB) 2.5 mg-0.5 mg/3 ml nebu   No No   Sig: 3 mL by Nebulization route every six (6) hours as needed for Wheezing. aspirin 81 mg chewable tablet   No No   Sig: Take 2 Tabs by mouth daily. atorvastatin (LIPITOR) 80 mg tablet   No No   Sig: Take 1 Tab by mouth daily. calcitRIOL (ROCALTROL) 0.25 mcg capsule   No No   Sig: Take 1 Cap by mouth daily. carvediloL (COREG) 25 mg tablet   No No   Sig: Take 1 Tab by mouth two (2) times daily (with meals). doxycycline (MONODOX) 100 mg capsule   No No   Sig: Take 1 Capsule by mouth two (2) times a day for 7 days. furosemide (LASIX) 20 mg tablet   No No   Sig: Take 1 Tablet by mouth in the morning.   gabapentin (NEURONTIN) 400 mg capsule   Yes No   Sig: Take 400 mg by mouth in the morning. hydrALAZINE (APRESOLINE) 100 mg tablet   Yes No   Sig: Take 100 mg by mouth three (3) times daily. insulin detemir (LEVEMIR) 100 unit/mL injection   No No   Sig: 15 units daily for diabetes   olodateroL (STRIVERDI) 2.5 mcg/actuation mist   No No   Sig: Take 2 Puffs by inhalation in the morning. predniSONE (DELTASONE) 50 mg tablet   No No   Sig: Take 1 Tablet by mouth daily for 5 days. tiZANidine (ZANAFLEX) 4 mg tablet   Yes No   Sig: Take 4 mg by mouth two (2) times daily as needed for Muscle Spasm(s). umeclidinium-vilanterol (ANORO ELLIPTA) 62.5-25 mcg/actuation inhaler   No No   Sig: Take 1 Puff by inhalation daily. For COPD      Facility-Administered Medications: None       REVIEW OF SYSTEMS:    Please see above otw 10 point ROS checked and negative. Objective:   VITALS:    Visit Vitals  BP (!) 200/79   Pulse 80   Temp 97.8 °F (36.6 °C)   Resp 18   Ht 5' 4\" (1.626 m)   Wt 110.7 kg (244 lb)   SpO2 99%   BMI 41.88 kg/m²     Temp (24hrs), Av.3 °F (36.8 °C), Min:97.8 °F (36.6 °C), Max:98.8 °F (37.1 °C)      PHYSICAL EXAM:   PHYSICAL EXAM:   General:    Alert, cooperative, no distress, appears stated age. Head:   Normocephalic, without obvious abnormality, atraumatic. Eyes:   Conjunctivae clear, anicteric sclerae. Pupils are equal  Nose:  Nares normal. No drainage. Throat:    Lips, mucosa, and tongue normal.  No Thrush  Neck:  Supple, symmetrical,  no adenopathy,      and no JVD. Lungs:   Clear to auscultation bilaterally. No Wheezing or Rhonchi. No rales. Chest wall:  No tenderness or deformity. No Accessory muscle use. Heart:   Regular rate and rhythm,  no murmur, rub or gallop. Abdomen:   Soft, non-tender. Not distended. Bowel sounds normal. No masses  Extremities: Extremities normal, atraumatic, No cyanosis. No edema. No clubbing  Skin:     Texture, turgor normal. No rashes or lesions. Not Jaundiced  Lymph nodes: Cervical, supraclavicular normal.  Psych:  Good insight. Not depressed. Not anxious or agitated. Neurologic: EOMs intact. No facial asymmetry. No aphasia or slurred speech. Normal   strength, Alert and oriented X 3.          LAB DATA REVIEWED:    No components found for: [place]  Recent Labs     22  0734      K 4.6   *   CO2 23   BUN 51*   CREA 1.83*   *   CA 9.5   ALB 3.3*   WBC 10.1   HGB 9.7*   HCT 31.4*            IMAGING RESULTS:   [x]  I have personally reviewed the actual   [x]CXR  []CT scan    CXR:IMPRESSION     Stable enlarged cardiac silhouette with improved pulmonary vascular congestion. Right basilar infiltrates that could represent pneumonia versus asymmetric  edema.   CT :    Assessment/Plan:      Active Problems:    Acute respiratory failure with hypoxia (HCC) (10/18/2019)      CHF (congestive heart failure) ([address] (9/9/2022)    -Acute diastolic heart failure    -Possible CAP based on cxr findings    -Hospitalization for COVID pna sometime in August?    -Hypertensive emergency: remains on nitro gtt    -Initially required bipap now transitioned to 02 via NC at 4L/min    HISTORY OF:  -DM2 on levemir, A1c-->7.2.  -CKD III  -HTN  -COPD  ___________________________________________________  PLAN:      -Re start op bp meds, cont nitro gtt  -Diuresis per cardiology  -Check procal, monitor Cx, low threshold to stop abx  -Lantus low dose for now and adjust, corrective insulin  -monitor electrolytes and renal function                Risk of deterioration:  []Low    [x]Moderate  []High              Prophylaxis:  [x]Lovenox  []Coumadin  []Hep SQ  []SCDs  []H2B/PPI    Disposition:  [x]Home w/ Family   []HH PT,OT,RN   []SNF/LTC   []SAH/Rehab    Discussed Code Status:    [x]Full Code      []DNR     ___________________________________________________    Care Plan discussed with:    []Patient   []Family    []ED Care Manager  [x]ED Doc   []Specialist :    Total Time Coordinating Admission:      minutes    []Total Critical Care Time:     ___________________________________________________  Admitting Physician: Estrella Luis MD

## 2022-09-09 NOTE — ED TRIAGE NOTES
Patient arrives to Ed with report of SOB, hx CHF. Patient was given duoneb, 1.5in nitro paste and 125mg solumedrol by EMS . 22 left hand.

## 2022-09-09 NOTE — PROGRESS NOTES
Bedside and Verbal shift change report received from Willian Pham RN(offgoing nurse). Report included the following information SBAR, Kardex, Intake/Output, Recent Results, and Cardiac Rhythm NSR .     2000: AOX4, on 4L nC, resting in bed watching TV; still with some shortness of breath with exertion, HOB at 45<, Nitroglycerin Drip infusing well; v/s checked; shift assessment done; discussed reporting of pain, worsening of symptoms, other discomforts; NSR on tele    2002: Tylenol po given as requested for neck pain- see flowsheet    2110: Dr Ry Nath informed of pt's BP 's; decreased Nitro to 15 mcg/min    2137: ; 6 units Humalog given sc for coverage; scheduled meds given    2155: still c/o neck pain and intermittent chest pain which she said has been the same since she was admitted; Dr. Ry Nath informed; order for Percocet po noted and given to pt    2245: awake, verbalized relief from pain; no other complaints voiced    2333: reassessment done; Nitro drip infusing well; no needs voiced    0200: sleeping comfortably    0400: c/o IV discomfort; no infiltration, no swelling noted; g 22 IV heplock inserted on right forearm x 1 attempt; Nitro Drip infusing well    0500: lucian care done; pads changed    0700: resting comfortably    Bedside and Verbal shift change report given to Janice Banerjee (oncoming nurse) by Gilda Calzada RN (offgoing nurse). Report included the following information SBAR, Kardex, Intake/Output, Recent Results, and Cardiac Rhythm NSR .       Wound Prevention Checklist    Patient: July He (89 y.o. female)  Date: 9/10/2022  Diagnosis: Acute respiratory failure with hypoxia ([facility]) [J96.01]  CHF (congestive heart failure) ([facility] [I50.9] <principal problem not specified>    Precautions:         []  Heel prevention boots placed on patient    []  Patient turned q2h during shift    []  Lift team ordered    [x]  Patient on Kelly bed/Specialty bed    []  Each Wound is documented during shift (Stage, Color, drainage, odor, measurements, and dressings)    [x]  Dual skin checks done at bedside during shift report with Rosina Chatman RN

## 2022-09-09 NOTE — PROGRESS NOTES
completed the initial Spiritual Assessment of the patient in bed 10 of and offered Pastoral Care, support to the patient. Patient is now on the c-pap machine and trying to get some relief for her respiratory distress. Patient completed a Medical Power of  today. Patient does not have any Taoism/cultural needs that will affect patients preferences in health care. Chaplains will continue to follow and will provide pastoral care on an as needed/requested basis.     Sarah Chung  Spiritual Care Department  [phone]

## 2022-09-09 NOTE — RT PROTOCOL NOTE
RT called for ABG and bipap setup NIV  Slight SOB at time of visisiting patient in ED. Sats 99 on 4 liters  oxygen via NC.  Started NIV after ABG taken for CHF per Dr Mic Reddy, 16/8 35% 02   Latest Reference Range & Units 9/9/22 08:23   pH (POC) 7.35 - 7.45   7.38   pCO2 (POC) 35.0 - 45.0 MMHG 35.8   pO2 (POC) 80 - 100 MMHG 95   HCO3 (POC) 22 - 26 MMOL/L 21.0 (L)   sO2 (POC) 92 - 97 % 97.3 (H)   Base deficit (POC) mmol/L 3.6   FIO2 (POC) % 4

## 2022-09-10 LAB
ANION GAP SERPL CALC-SCNC: 6 MMOL/L (ref 3–18)
ATRIAL RATE: 99 BPM
BASOPHILS # BLD: 0 K/UL (ref 0–0.1)
BASOPHILS NFR BLD: 0 % (ref 0–2)
BUN SERPL-MCNC: 59 MG/DL (ref 7–18)
BUN/CREAT SERPL: 29 (ref 12–20)
CALCIUM SERPL-MCNC: 9.6 MG/DL (ref 8.5–10.1)
CALCULATED P AXIS, ECG09: 70 DEGREES
CALCULATED R AXIS, ECG10: 28 DEGREES
CALCULATED T AXIS, ECG11: 78 DEGREES
CHLORIDE SERPL-SCNC: 107 MMOL/L (ref 100–111)
CO2 SERPL-SCNC: 26 MMOL/L (ref 21–32)
CREAT SERPL-MCNC: 2.01 MG/DL (ref 0.6–1.3)
DIAGNOSIS, 93000: NORMAL
DIFFERENTIAL METHOD BLD: ABNORMAL
EOSINOPHIL # BLD: 0 K/UL (ref 0–0.4)
EOSINOPHIL NFR BLD: 0 % (ref 0–5)
ERYTHROCYTE [DISTWIDTH] IN BLOOD BY AUTOMATED COUNT: 17.4 % (ref 11.6–14.5)
GLUCOSE BLD STRIP.AUTO-MCNC: 148 MG/DL (ref 70–110)
GLUCOSE BLD STRIP.AUTO-MCNC: 245 MG/DL (ref 70–110)
GLUCOSE BLD STRIP.AUTO-MCNC: 246 MG/DL (ref 70–110)
GLUCOSE BLD STRIP.AUTO-MCNC: 280 MG/DL (ref 70–110)
GLUCOSE SERPL-MCNC: 157 MG/DL (ref 74–99)
HCT VFR BLD AUTO: 32.8 % (ref 35–45)
HGB BLD-MCNC: 10.1 G/DL (ref 12–16)
IMM GRANULOCYTES # BLD AUTO: 0.5 K/UL (ref 0–0.04)
IMM GRANULOCYTES NFR BLD AUTO: 5 % (ref 0–0.5)
LYMPHOCYTES # BLD: 0.9 K/UL (ref 0.9–3.6)
LYMPHOCYTES NFR BLD: 9 % (ref 21–52)
MAGNESIUM SERPL-MCNC: 1.8 MG/DL (ref 1.6–2.6)
MCH RBC QN AUTO: 28.2 PG (ref 24–34)
MCHC RBC AUTO-ENTMCNC: 30.8 G/DL (ref 31–37)
MCV RBC AUTO: 91.6 FL (ref 78–100)
MONOCYTES # BLD: 0.8 K/UL (ref 0.05–1.2)
MONOCYTES NFR BLD: 8 % (ref 3–10)
NEUTS SEG # BLD: 8.2 K/UL (ref 1.8–8)
NEUTS SEG NFR BLD: 78 % (ref 40–73)
NRBC # BLD: 0.51 K/UL (ref 0–0.01)
NRBC BLD-RTO: 4.9 PER 100 WBC
P-R INTERVAL, ECG05: 156 MS
PLATELET # BLD AUTO: 360 K/UL (ref 135–420)
PLATELET COMMENTS,PCOM: ABNORMAL
PMV BLD AUTO: 9.7 FL (ref 9.2–11.8)
POTASSIUM SERPL-SCNC: 5.1 MMOL/L (ref 3.5–5.5)
Q-T INTERVAL, ECG07: 340 MS
QRS DURATION, ECG06: 82 MS
QTC CALCULATION (BEZET), ECG08: 436 MS
RBC # BLD AUTO: 3.58 M/UL (ref 4.2–5.3)
RBC MORPH BLD: ABNORMAL
RBC MORPH BLD: ABNORMAL
SODIUM SERPL-SCNC: 139 MMOL/L (ref 136–145)
TROPONIN-HIGH SENSITIVITY: 40 NG/L (ref 0–54)
VENTRICULAR RATE, ECG03: 99 BPM
WBC # BLD AUTO: 10.4 K/UL (ref 4.6–13.2)

## 2022-09-10 PROCEDURE — 74011000250 HC RX REV CODE- 250: Performed by: INTERNAL MEDICINE

## 2022-09-10 PROCEDURE — 99232 SBSQ HOSP IP/OBS MODERATE 35: CPT | Performed by: HOSPITALIST

## 2022-09-10 PROCEDURE — 80048 BASIC METABOLIC PNL TOTAL CA: CPT

## 2022-09-10 PROCEDURE — 85025 COMPLETE CBC W/AUTO DIFF WBC: CPT

## 2022-09-10 PROCEDURE — 74011250637 HC RX REV CODE- 250/637: Performed by: INTERNAL MEDICINE

## 2022-09-10 PROCEDURE — 83735 ASSAY OF MAGNESIUM: CPT

## 2022-09-10 PROCEDURE — 65660000004 HC RM CVT STEPDOWN

## 2022-09-10 PROCEDURE — 36415 COLL VENOUS BLD VENIPUNCTURE: CPT

## 2022-09-10 PROCEDURE — 74011000258 HC RX REV CODE- 258: Performed by: INTERNAL MEDICINE

## 2022-09-10 PROCEDURE — 77010033678 HC OXYGEN DAILY

## 2022-09-10 PROCEDURE — 74011636637 HC RX REV CODE- 636/637: Performed by: INTERNAL MEDICINE

## 2022-09-10 PROCEDURE — 2709999900 HC NON-CHARGEABLE SUPPLY

## 2022-09-10 PROCEDURE — 74011636637 HC RX REV CODE- 636/637: Performed by: HOSPITALIST

## 2022-09-10 PROCEDURE — 74011250636 HC RX REV CODE- 250/636: Performed by: INTERNAL MEDICINE

## 2022-09-10 PROCEDURE — 99233 SBSQ HOSP IP/OBS HIGH 50: CPT | Performed by: INTERNAL MEDICINE

## 2022-09-10 PROCEDURE — 82962 GLUCOSE BLOOD TEST: CPT

## 2022-09-10 PROCEDURE — 77030040393 HC DRSG OPTIFOAM GENT MDII -B

## 2022-09-10 PROCEDURE — 77030038269 HC DRN EXT URIN PURWCK BARD -A

## 2022-09-10 PROCEDURE — 94762 N-INVAS EAR/PLS OXIMTRY CONT: CPT

## 2022-09-10 PROCEDURE — 84484 ASSAY OF TROPONIN QUANT: CPT

## 2022-09-10 RX ADMIN — FUROSEMIDE 40 MG: 10 INJECTION, SOLUTION INTRAMUSCULAR; INTRAVENOUS at 17:15

## 2022-09-10 RX ADMIN — ENOXAPARIN SODIUM 40 MG: 100 INJECTION SUBCUTANEOUS at 09:12

## 2022-09-10 RX ADMIN — DOXYCYCLINE 100 MG: 100 INJECTION, POWDER, LYOPHILIZED, FOR SOLUTION INTRAVENOUS at 09:13

## 2022-09-10 RX ADMIN — GABAPENTIN 400 MG: 400 CAPSULE ORAL at 09:12

## 2022-09-10 RX ADMIN — FUROSEMIDE 40 MG: 10 INJECTION, SOLUTION INTRAMUSCULAR; INTRAVENOUS at 09:12

## 2022-09-10 RX ADMIN — SODIUM CHLORIDE, PRESERVATIVE FREE 10 ML: 5 INJECTION INTRAVENOUS at 17:15

## 2022-09-10 RX ADMIN — AMLODIPINE BESYLATE 10 MG: 10 TABLET ORAL at 09:12

## 2022-09-10 RX ADMIN — WATER 2 G: 1 INJECTION INTRAMUSCULAR; INTRAVENOUS; SUBCUTANEOUS at 10:03

## 2022-09-10 RX ADMIN — DOXYCYCLINE 100 MG: 100 INJECTION, POWDER, LYOPHILIZED, FOR SOLUTION INTRAVENOUS at 20:49

## 2022-09-10 RX ADMIN — HYDRALAZINE HYDROCHLORIDE 100 MG: 50 TABLET, FILM COATED ORAL at 17:15

## 2022-09-10 RX ADMIN — CARVEDILOL 25 MG: 25 TABLET, FILM COATED ORAL at 09:12

## 2022-09-10 RX ADMIN — Medication 9 UNITS: at 09:15

## 2022-09-10 RX ADMIN — SODIUM CHLORIDE, PRESERVATIVE FREE 10 ML: 5 INJECTION INTRAVENOUS at 05:13

## 2022-09-10 RX ADMIN — Medication 6 UNITS: at 12:10

## 2022-09-10 RX ADMIN — HYDRALAZINE HYDROCHLORIDE 100 MG: 50 TABLET, FILM COATED ORAL at 09:12

## 2022-09-10 RX ADMIN — HYDRALAZINE HYDROCHLORIDE 100 MG: 50 TABLET, FILM COATED ORAL at 21:04

## 2022-09-10 RX ADMIN — SODIUM CHLORIDE, PRESERVATIVE FREE 10 ML: 5 INJECTION INTRAVENOUS at 21:04

## 2022-09-10 RX ADMIN — ASPIRIN 81 MG CHEWABLE TABLET 81 MG: 81 TABLET CHEWABLE at 09:12

## 2022-09-10 RX ADMIN — OXYCODONE HYDROCHLORIDE AND ACETAMINOPHEN 1 TABLET: 5; 325 TABLET ORAL at 17:20

## 2022-09-10 RX ADMIN — Medication 6 UNITS: at 21:56

## 2022-09-10 RX ADMIN — CARVEDILOL 25 MG: 25 TABLET, FILM COATED ORAL at 17:15

## 2022-09-10 RX ADMIN — ATORVASTATIN CALCIUM 40 MG: 40 TABLET, FILM COATED ORAL at 21:04

## 2022-09-10 RX ADMIN — Medication 7 UNITS: at 09:15

## 2022-09-10 NOTE — ROUTINE PROCESS
2000 Bedside and Verbal shift change report given to Dioni Alaniz (oncoming nurse). Report included the following information SBAR, Intake/Output, MAR, Recent Results, and Cardiac Rhythm NSR .

## 2022-09-10 NOTE — PROGRESS NOTES
Cardiology Progress Note    Admit Date: 9/9/2022  Attending Cardiologist: Dr. Maryana Kay:     Hospital Problems  Date Reviewed: 4/26/2021            Codes Class Noted POA    CHF (congestive heart failure) ([facility] ICD-10-CM: I50.9  ICD-9-CM: 428.0  9/9/2022 Unknown        Acute respiratory failure with hypoxia [facility]) ICD-10-CM: J96.01  ICD-9-CM: 518.81  10/18/2019 Unknown         -Acute hypoxic respiratory failure in setting of below, s/p bipap. Improved. -Flash pulmonary edema in setting of severely elevated blood pressures.   -Chest pain, atypical with pleuritic component. MI r/o with serial negative cardiac enzymes. No acute ischemic changes on ECG. -Hypertensive crisis, Max /121 mmHg. Improved. -a/c HFpEF. Last Echo in June 2022 with normal LVEF.   -CAD, s/p cardiac cath 10/2019 with findings as follows:  LM: Angiographically normal  LAD: Previously placed ostial to proximal LAD stent is widely patent. D1 with ostial 50% stenosis. -CKD  -Anemia  -HTN  -DM  -Hypercholesterolemia, on statin  -COPD        Primary cardiologist is Dr. Parminder Ugalde:     -Pressures improving, Wean nitro gtt, continue hydralazine and coreg. Start po amlodipine.   -continued on IV diuretics, diuresing well. Monitor strict I/os and electrolytes. -wean O2 as tolerated. -Continue ASA and statin. Subjective:     Remains SOB but markedly improved since admission. Denies CP.      Objective:      Patient Vitals for the past 8 hrs:   Temp Pulse Resp BP SpO2   09/10/22 0500 -- 72 17 (!) 163/70 98 %   09/10/22 0400 98.3 °F (36.8 °C) 83 21 138/63 96 %   09/10/22 0301 98.3 °F (36.8 °C) 75 18 -- 97 %         Patient Vitals for the past 96 hrs:   Weight   09/10/22 0301 99.8 kg (220 lb)   09/09/22 0742 110.7 kg (244 lb)       TELE: SR               Current Facility-Administered Medications   Medication Dose Route Frequency Last Admin    cefTRIAXone (ROCEPHIN) 2 g in sterile water (preservative free) 20 mL IV syringe  2 g IntraVENous Q24H 2 g at 09/09/22 1050    doxycycline (VIBRAMYCIN) 100 mg in 0.9% sodium chloride (MBP/ADV) 100 mL MBP  100 mg IntraVENous Q12H 100 mg at 09/09/22 2002    albuterol-ipratropium (DUO-NEB) 2.5 MG-0.5 MG/3 ML  3 mL Nebulization Q6H PRN      carvediloL (COREG) tablet 25 mg  25 mg Oral BID WITH MEALS 25 mg at 09/09/22 1732    [Held by provider] furosemide (LASIX) tablet 20 mg  20 mg Oral DAILY      nitroGLYcerin (TRIDIL) 400 mcg/ml infusion  20 mcg/min IntraVENous CONTINUOUS 15 mcg/min at 09/09/22 2110    hydrALAZINE (APRESOLINE) tablet 100 mg  100 mg Oral  mg at 09/09/22 2122    amLODIPine (NORVASC) tablet 10 mg  10 mg Oral DAILY      furosemide (LASIX) injection 40 mg  40 mg IntraVENous BID 40 mg at 09/09/22 1732    aspirin chewable tablet 81 mg  81 mg Oral DAILY      atorvastatin (LIPITOR) tablet 40 mg  40 mg Oral QHS 40 mg at 09/09/22 2122    gabapentin (NEURONTIN) capsule 400 mg  400 mg Oral DAILY      sodium chloride (NS) flush 5-40 mL  5-40 mL IntraVENous Q8H 10 mL at 09/10/22 0513    sodium chloride (NS) flush 5-40 mL  5-40 mL IntraVENous PRN      acetaminophen (TYLENOL) tablet 650 mg  650 mg Oral Q6H  mg at 09/09/22 2002    Or    acetaminophen (TYLENOL) suppository 650 mg  650 mg Rectal Q6H PRN      polyethylene glycol (MIRALAX) packet 17 g  17 g Oral DAILY PRN      ondansetron (ZOFRAN ODT) tablet 4 mg  4 mg Oral Q8H PRN      Or    ondansetron (ZOFRAN) injection 4 mg  4 mg IntraVENous Q6H PRN      enoxaparin (LOVENOX) injection 40 mg  40 mg SubCUTAneous DAILY      insulin lispro (HUMALOG) injection   SubCUTAneous AC&HS 6 Units at 09/09/22 2136    glucose chewable tablet 16 g  16 g Oral PRN      glucagon (GLUCAGEN) injection 1 mg  1 mg IntraMUSCular PRN      dextrose 10% infusion 0-250 mL  0-250 mL IntraVENous PRN      insulin glargine (LANTUS) injection 7 Units  7 Units SubCUTAneous DAILY      naloxone (NARCAN) injection 0.4 mg  0.4 mg IntraVENous EVERY 2 MINUTES AS NEEDED oxyCODONE-acetaminophen (PERCOCET) 5-325 mg per tablet 1 Tablet  1 Tablet Oral Q4H PRN 1 Tablet at 09/09/22 2155         Intake/Output Summary (Last 24 hours) at 9/10/2022 0745  Last data filed at 9/10/2022 0523  Gross per 24 hour   Intake 343.33 ml   Output 3550 ml   Net -3206.67 ml       Physical Exam:  General:  alert, cooperative, no distress, appears stated age  Neck:  no JVD  Lungs:  few bibasilar rales   Heart:  regular rate and rhythm, S1, S2 normal, no murmur, click, rub or gallop  Abdomen:  abdomen is soft without significant tenderness, masses, organomegaly or guarding  Extremities:  extremities normal, atraumatic, no cyanosis or edema    Visit Vitals  BP (!) 163/70   Pulse 72   Temp 98.3 °F (36.8 °C)   Resp 17   Ht 5' 4\" (1.626 m)   Wt 99.8 kg (220 lb)   SpO2 98%   BMI 37.76 kg/m²       Data Review:     Labs: Results:       Chemistry Recent Labs     09/10/22  0549 09/09/22  0734   * 110*    141   K 5.1 4.6    112*   CO2 26 23   BUN 59* 51*   CREA 2.01* 1.83*   CA 9.6 9.5   AGAP 6 6   BUCR 29* 28*   AP  --  116   TP  --  6.6   ALB  --  3.3*   GLOB  --  3.3   AGRAT  --  1.0      CBC w/Diff Recent Labs     09/10/22  0549 09/09/22  0734   WBC 10.4 10.1   RBC 3.58* 3.37*   HGB 10.1* 9.7*   HCT 32.8* 31.4*    327   GRANS PENDING 66   LYMPH PENDING 22   EOS PENDING 0      Cardiac Enzymes No results found for: CPK, CK, CKMMB, CKMB, RCK3, CKMBT, CKNDX, CKND1, GODFREY, TROPT, TROIQ, SANG, TROPT, TNIPOC, BNP, BNPP   Coagulation Recent Labs     09/09/22  0754   PTP 13.5   INR 1.0       Lipid Panel Lab Results   Component Value Date/Time    Cholesterol, total 152 10/10/2019 12:00 AM    HDL Cholesterol 46 10/10/2019 12:00 AM    LDL, calculated 86 10/10/2019 12:00 AM    VLDL, calculated 20 10/10/2019 12:00 AM    Triglyceride 101 10/10/2019 12:00 AM    CHOL/HDL Ratio 5.6 (H) 05/21/2018 06:23 AM      BNP No results found for: BNP, BNPP, XBNPT   Liver Enzymes Recent Labs     09/09/22  0734   TP 6.6 ALB 3.3*         Thyroid Studies Lab Results   Component Value Date/Time    TSH 0.21 (L) 03/19/2021 04:00 AM          Signed By: Albina Lennox, PA-C     September 10, 2022

## 2022-09-10 NOTE — PROGRESS NOTES
[facility] Hospitalist Group  Progress Note    Patient: Claudine Aparicio Age: 81 y.o. : 1950 MR#: [id number] SSN: [id number]  Date/Time: 9/10/2022     Subjective:     Patient seen and evaluated, lying in bed, no acute distress. 59-year-old female with a history of chronic diastolic CHF, COPD presents to the hospital secondary to shortness of breath. Patient noted to have elevated blood pressure in the emergency room, started on nitro gtt. Patient admitted to stepdown unit for further evaluation. Assessment/Plan:     Hypertensive urgency-blood pressure improving, wean nitro gtt. , continue hydralazine and Coreg, start p.o. amlodipine  Acute on chronic diastolic CHF exacerbation-continue diuresis with IV Lasix, monitor strict I's and O's and electrolytes  History of type 2 diabetes-continue Lantus and insulin sliding scale, monitor blood sugars  History of COPD-continue bronchodilator therapy  History of hypertension-resume chronic home medications, continue to monitor blood pressure, wean off nitroglycerin gtt.   History of CKD 3-monitor creatinine in the setting of diuresis  Right basilar infiltrate which could represent pneumonia versus asymmetric edema-continue IV antibiotics, await culture reports with quick de-escalation  DVT prophylaxis-Lovenox  Full code patient                Rose South MD  09/10/22      Case discussed with:  [x]Patient  []Family  [x]Nursing  []Case Management  DVT Prophylaxis:  [x]Lovenox  []Hep SQ  []SCDs  []Coumadin   []On Heparin gtt    Objective:   VS: Visit Vitals  BP (!) 197/74   Pulse 82   Temp 98.7 °F (37.1 °C)   Resp 16   Ht 5' 4\" (1.626 m)   Wt 99.8 kg (220 lb)   SpO2 97%   BMI 37.76 kg/m²      Tmax/24hrs: Temp (24hrs), Av.4 °F (36.9 °C), Min:97.9 °F (36.6 °C), Max:98.7 °F (37.1 °C)  IOBRIEF  Intake/Output Summary (Last 24 hours) at 9/10/2022 1645  Last data filed at 9/10/2022 1508  Gross per 24 hour   Intake 580 ml   Output 3050 ml Net -2470 ml       General:  Alert, cooperative, no acute distress    Pulmonary: Decreased breath sounds in bases bilaterally  Cardiovascular: Regular rate and Rhythm. GI:  Soft, Non distended, Non tender. + Bowel sounds. Extremities:  No edema, cyanosis, clubbing. No calf tenderness. Neurologic: Alert and oriented X 3. No acute neuro deficits.   Additional:    Medications:   Current Facility-Administered Medications   Medication Dose Route Frequency    cefTRIAXone (ROCEPHIN) 2 g in sterile water (preservative free) 20 mL IV syringe  2 g IntraVENous Q24H    doxycycline (VIBRAMYCIN) 100 mg in 0.9% sodium chloride (MBP/ADV) 100 mL MBP  100 mg IntraVENous Q12H    albuterol-ipratropium (DUO-NEB) 2.5 MG-0.5 MG/3 ML  3 mL Nebulization Q6H PRN    carvediloL (COREG) tablet 25 mg  25 mg Oral BID WITH MEALS    [Held by provider] furosemide (LASIX) tablet 20 mg  20 mg Oral DAILY    nitroGLYcerin (TRIDIL) 400 mcg/ml infusion  10 mcg/min IntraVENous CONTINUOUS    hydrALAZINE (APRESOLINE) tablet 100 mg  100 mg Oral TID    amLODIPine (NORVASC) tablet 10 mg  10 mg Oral DAILY    furosemide (LASIX) injection 40 mg  40 mg IntraVENous BID    aspirin chewable tablet 81 mg  81 mg Oral DAILY    atorvastatin (LIPITOR) tablet 40 mg  40 mg Oral QHS    gabapentin (NEURONTIN) capsule 400 mg  400 mg Oral DAILY    sodium chloride (NS) flush 5-40 mL  5-40 mL IntraVENous Q8H    sodium chloride (NS) flush 5-40 mL  5-40 mL IntraVENous PRN    acetaminophen (TYLENOL) tablet 650 mg  650 mg Oral Q6H PRN    Or    acetaminophen (TYLENOL) suppository 650 mg  650 mg Rectal Q6H PRN    polyethylene glycol (MIRALAX) packet 17 g  17 g Oral DAILY PRN    ondansetron (ZOFRAN ODT) tablet 4 mg  4 mg Oral Q8H PRN    Or    ondansetron (ZOFRAN) injection 4 mg  4 mg IntraVENous Q6H PRN    enoxaparin (LOVENOX) injection 40 mg  40 mg SubCUTAneous DAILY    insulin lispro (HUMALOG) injection   SubCUTAneous AC&HS    glucose chewable tablet 16 g  16 g Oral PRN glucagon (GLUCAGEN) injection 1 mg  1 mg IntraMUSCular PRN    dextrose 10% infusion 0-250 mL  0-250 mL IntraVENous PRN    insulin glargine (LANTUS) injection 7 Units  7 Units SubCUTAneous DAILY    naloxone (NARCAN) injection 0.4 mg  0.4 mg IntraVENous EVERY 2 MINUTES AS NEEDED    oxyCODONE-acetaminophen (PERCOCET) 5-325 mg per tablet 1 Tablet  1 Tablet Oral Q4H PRN       Imaging:   XR Results (most recent):  Results from Hospital Encounter encounter on 09/09/22    XR CHEST PORT    Narrative  EXAM:  XR CHEST PORT    INDICATION:   SOB    COMPARISON: 9/5/2022. FINDINGS:  Stable mildly enlarged cardiac silhouette. Improved pulmonary vascular  congestion. Aortic atherosclerosis. Background of mildly increased interstitial  prominence. Right basilar hazy airspace opacity silhouetting vasculatures. No  confluent consolidation. No pneumothorax or pleural effusion. Intact osseous  structures. Impression  Stable enlarged cardiac silhouette with improved pulmonary vascular congestion. Right basilar infiltrates that could represent pneumonia versus asymmetric  edema. CT Results (most recent):  Results from Hospital Encounter encounter on 08/08/22    CTA CHEST W OR W WO CONT    Narrative  CT CHEST PULMONARY EMBOLISM PROTOCOL    CPT code: 21916    INDICATION: Dyspnea. Covid positive. Question pulmonary embolism. TECHNIQUE: 8.2NZ collimation helical images obtained through the level of the  pulmonary arteries with additional imaging through the chest following the  uneventful administration of 72 cc's nonionic intravenous contrast.  - Images reconstructed into three dimensional coronal and sagittal projections  for complete evaluation of the tortuous and overlapping pulmonary vascular  structures and to reduce patient radiation dose.     All CT scans at this facility are performed using dose optimization technique as  appropriate to this specific exam, to include automated exposure control,  adjustment of the mA and/or KP according to patient size or use of iterative  reconstruction techniques. COMPARISON: Prior CT 7/8/2022    FINDINGS:  Opacification of the pulmonary arteries is adequate. No filling defects are appreciated within the main, left, right, lobar or  visualized segmental pulmonary arteries to suggest embolism. Main pulmonary artery is enlarged, measuring up to 3.8 cm diameter, similar to  the prior exam. Findings can be seen in setting of pulmonary artery  hypertension. Heart size top normal. No pericardial effusion. Nonenlarged aorta. Atherosclerotic calcification in the arch and at level of  proximal arch branch vessels. Geographic areas of mildly increased groundglass attenuation in the right upper  lobe fairly diffuse, without consolidation. Less extensive changes in the  superior aspect of the right middle lobe. Similar findings in the superior  segment right lower lobe and ventral to central right lower lobe. -Dependent peripheral atelectasis/consolidation subsegmental right lower lobe  adjacent to a small effusion. Similar appearing subsegmental dependent atelectasis left lower lobe adjacent to  a trace effusion. Mild degree atherosclerotic narrowing of the proximal SMA. Impression  1. No CT evidence for central pulmonary embolism.  -Similar enlargement of central pulmonary arteries suggest pulmonary artery  hypertension. 2. Multifocal geographic and peribronchial groundglass airspace infiltrates more  significant in the right lung than left. Imaging appearance is not specifically  characteristic of Covid pneumonia and could reflect viral inflammation or less  likely developing asymmetric pulmonary edema. 3. Multisegmental dependent atelectasis/consolidation right lower lobe adjacent  to a small effusion.  -Similar but less extensive subsegmental atelectasis adjacent to trace left  effusion.       06/25/22    ECHO ADULT COMPLETE 06/29/2022 6/29/2022    Interpretation Summary  Formatting of this result is different from the original.      Left Ventricle: Mildly reduced left ventricular systolic function with a visually estimated EF of 50 - 55%. Left ventricle is mildly dilated. Increased wall thickness. Findings consistent with mild concentric hypertrophy. See diagram for wall motion findings. Diastolic dysfunction present with normal LV EF. Mitral Valve: Mildly thickened leaflet. Mild regurgitation with a centrally directed jet. Signed by: Siva Erickson MD on 6/29/2022 12:12 PM       MRI Results (most recent):  No results found for this or any previous visit. Labs:    Recent Results (from the past 48 hour(s))   CBC WITH AUTOMATED DIFF    Collection Time: 09/09/22  7:34 AM   Result Value Ref Range    WBC 10.1 4.6 - 13.2 K/uL    RBC 3.37 (L) 4.20 - 5.30 M/uL    HGB 9.7 (L) 12.0 - 16.0 g/dL    HCT 31.4 (L) 35.0 - 45.0 %    MCV 93.2 78.0 - 100.0 FL    MCH 28.8 24.0 - 34.0 PG    MCHC 30.9 (L) 31.0 - 37.0 g/dL    RDW 17.7 (H) 11.6 - 14.5 %    PLATELET 789 714 - 931 K/uL    MPV 9.3 9.2 - 11.8 FL    NRBC 5.6 (H) 0  WBC    ABSOLUTE NRBC 0.56 (H) 0.00 - 0.01 K/uL    NEUTROPHILS 66 40 - 73 %    LYMPHOCYTES 22 21 - 52 %    MONOCYTES 9 3 - 10 %    EOSINOPHILS 0 0 - 5 %    BASOPHILS 0 0 - 2 %    IMMATURE GRANULOCYTES 3 (H) 0.0 - 0.5 %    ABS. NEUTROPHILS 6.7 1.8 - 8.0 K/UL    ABS. LYMPHOCYTES 2.2 0.9 - 3.6 K/UL    ABS. MONOCYTES 0.9 0.05 - 1.2 K/UL    ABS. EOSINOPHILS 0.0 0.0 - 0.4 K/UL    ABS. BASOPHILS 0.0 0.0 - 0.1 K/UL    ABS. IMM.  GRANS. 0.3 (H) 0.00 - 0.04 K/UL    DF AUTOMATED     METABOLIC PANEL, COMPREHENSIVE    Collection Time: 09/09/22  7:34 AM   Result Value Ref Range    Sodium 141 136 - 145 mmol/L    Potassium 4.6 3.5 - 5.5 mmol/L    Chloride 112 (H) 100 - 111 mmol/L    CO2 23 21 - 32 mmol/L    Anion gap 6 3.0 - 18 mmol/L    Glucose 110 (H) 74 - 99 mg/dL    BUN 51 (H) 7.0 - 18 MG/DL    Creatinine 1.83 (H) 0.6 - 1.3 MG/DL    BUN/Creatinine ratio 28 (H) 12 - 20 GFR est AA 33 (L) >60 ml/min/1.73m2    GFR est non-AA 27 (L) >60 ml/min/1.73m2    Calcium 9.5 8.5 - 10.1 MG/DL    Bilirubin, total 0.5 0.2 - 1.0 MG/DL    ALT (SGPT) 25 13 - 56 U/L    AST (SGOT) 18 10 - 38 U/L    Alk.  phosphatase 116 45 - 117 U/L    Protein, total 6.6 6.4 - 8.2 g/dL    Albumin 3.3 (L) 3.4 - 5.0 g/dL    Globulin 3.3 2.0 - 4.0 g/dL    A-G Ratio 1.0 0.8 - 1.7     NT-PRO BNP    Collection Time: 09/09/22  7:34 AM   Result Value Ref Range    NT pro-BNP 2,013 (H) 0 - 900 PG/ML   TROPONIN-HIGH SENSITIVITY    Collection Time: 09/09/22  7:54 AM   Result Value Ref Range    Troponin-High Sensitivity 50 0 - 54 ng/L   PROTHROMBIN TIME + INR    Collection Time: 09/09/22  7:54 AM   Result Value Ref Range    Prothrombin time 13.5 11.5 - 15.2 sec    INR 1.0 0.8 - 1.2     PROCALCITONIN    Collection Time: 09/09/22  7:54 AM   Result Value Ref Range    Procalcitonin <0.05 ng/mL   EKG, 12 LEAD, INITIAL    Collection Time: 09/09/22  8:04 AM   Result Value Ref Range    Ventricular Rate 99 BPM    Atrial Rate 99 BPM    P-R Interval 156 ms    QRS Duration 82 ms    Q-T Interval 340 ms    QTC Calculation (Bezet) 436 ms    Calculated P Axis 70 degrees    Calculated R Axis 28 degrees    Calculated T Axis 78 degrees    Diagnosis       Normal sinus rhythm  Minimal voltage criteria for LVH, may be normal variant ( Dave product )  Borderline ECG  When compared with ECG of 05-SEP-2022 07:56,  No significant change was found  Confirmed by Delmy Herron ([id number]) on 9/10/2022 9:11:14 AM     BLOOD GAS, ARTERIAL POC    Collection Time: 09/09/22  8:23 AM   Result Value Ref Range    Device: NASAL CANNULA      FIO2 (POC) 4 %    pH (POC) 7.38 7.35 - 7.45      pCO2 (POC) 35.8 35.0 - 45.0 MMHG    pO2 (POC) 95 80 - 100 MMHG    HCO3 (POC) 21.0 (L) 22 - 26 MMOL/L    sO2 (POC) 97.3 (H) 92 - 97 %    Base deficit (POC) 3.6 mmol/L    Set Rate 19 bpm    Allens test (POC) Positive      Site RIGHT RADIAL      Specimen type (POC) ARTERIAL Performed by [facility]    POC LACTIC ACID    Collection Time: 09/09/22  8:24 AM   Result Value Ref Range    Lactic Acid (POC) 1.32 0.40 - 2.00 mmol/L   CULTURE, BLOOD    Collection Time: 09/09/22  8:30 AM    Specimen: Blood   Result Value Ref Range    Special Requests: NO SPECIAL REQUESTS      Culture result: NO GROWTH AFTER 21 HOURS     CULTURE, BLOOD    Collection Time: 09/09/22  8:40 AM    Specimen: Blood   Result Value Ref Range    Special Requests: NO SPECIAL REQUESTS      Culture result: NO GROWTH AFTER 21 HOURS     GLUCOSE, POC    Collection Time: 09/09/22  4:12 PM   Result Value Ref Range    Glucose (POC) 198 (H) 70 - 110 mg/dL   TROPONIN-HIGH SENSITIVITY    Collection Time: 09/09/22  5:51 PM   Result Value Ref Range    Troponin-High Sensitivity 51 0 - 54 ng/L   GLUCOSE, POC    Collection Time: 09/09/22  9:21 PM   Result Value Ref Range    Glucose (POC) 287 (H) 70 - 922 mg/dL   METABOLIC PANEL, BASIC    Collection Time: 09/10/22  5:49 AM   Result Value Ref Range    Sodium 139 136 - 145 mmol/L    Potassium 5.1 3.5 - 5.5 mmol/L    Chloride 107 100 - 111 mmol/L    CO2 26 21 - 32 mmol/L    Anion gap 6 3.0 - 18 mmol/L    Glucose 157 (H) 74 - 99 mg/dL    BUN 59 (H) 7.0 - 18 MG/DL    Creatinine 2.01 (H) 0.6 - 1.3 MG/DL    BUN/Creatinine ratio 29 (H) 12 - 20      GFR est AA 30 (L) >60 ml/min/1.73m2    GFR est non-AA 24 (L) >60 ml/min/1.73m2    Calcium 9.6 8.5 - 10.1 MG/DL   CBC WITH AUTOMATED DIFF    Collection Time: 09/10/22  5:49 AM   Result Value Ref Range    WBC 10.4 4.6 - 13.2 K/uL    RBC 3.58 (L) 4.20 - 5.30 M/uL    HGB 10.1 (L) 12.0 - 16.0 g/dL    HCT 32.8 (L) 35.0 - 45.0 %    MCV 91.6 78.0 - 100.0 FL    MCH 28.2 24.0 - 34.0 PG    MCHC 30.8 (L) 31.0 - 37.0 g/dL    RDW 17.4 (H) 11.6 - 14.5 %    PLATELET 912 152 - 707 K/uL    MPV 9.7 9.2 - 11.8 FL    NRBC 4.9 (H) 0  WBC    ABSOLUTE NRBC 0.51 (H) 0.00 - 0.01 K/uL    NEUTROPHILS 78 (H) 40 - 73 %    LYMPHOCYTES 9 (L) 21 - 52 %    MONOCYTES 8 3 - 10 % EOSINOPHILS 0 0 - 5 %    BASOPHILS 0 0 - 2 %    IMMATURE GRANULOCYTES 5 (H) 0.0 - 0.5 %    ABS. NEUTROPHILS 8.2 (H) 1.8 - 8.0 K/UL    ABS. LYMPHOCYTES 0.9 0.9 - 3.6 K/UL    ABS. MONOCYTES 0.8 0.05 - 1.2 K/UL    ABS. EOSINOPHILS 0.0 0.0 - 0.4 K/UL    ABS. BASOPHILS 0.0 0.0 - 0.1 K/UL    ABS. IMM. GRANS. 0.5 (H) 0.00 - 0.04 K/UL    DF SMEAR SCANNED      PLATELET COMMENTS ADEQUATE PLATELETS      RBC COMMENTS NORMOCYTIC, NORMOCHROMIC      RBC COMMENTS FEW NRBC PRESENT    TROPONIN-HIGH SENSITIVITY    Collection Time: 09/10/22  5:49 AM   Result Value Ref Range    Troponin-High Sensitivity 40 0 - 54 ng/L   MAGNESIUM    Collection Time: 09/10/22  5:49 AM   Result Value Ref Range    Magnesium 1.8 1.6 - 2.6 mg/dL   GLUCOSE, POC    Collection Time: 09/10/22  8:50 AM   Result Value Ref Range    Glucose (POC) 280 (H) 70 - 110 mg/dL   GLUCOSE, POC    Collection Time: 09/10/22 11:11 AM   Result Value Ref Range    Glucose (POC) 245 (H) 70 - 110 mg/dL   GLUCOSE, POC    Collection Time: 09/10/22  4:08 PM   Result Value Ref Range    Glucose (POC) 148 (H) 70 - 110 mg/dL       Signed By: Torrance Soulier, MD     September 10, 2022      I spent 25 minutes with the patient in face-to-face consultation, of which greater than 50% was spent in counseling and coordination of care as described above    Disclaimer: Sections of this note are dictated using utilizing voice recognition software. Minor typographical errors may be present. If questions arise, please do not hesitate to contact me or call our department.

## 2022-09-10 NOTE — ROUTINE PROCESS
On Friday, September, 9,2022, at 2129 hours, I asked Ms. Gloria Blake if she want to be placed on her Bipap now or later. Ms. Gloria Blake voiced she doesn't want to be interfaced on the [facility]. I advised Ms. Gloria Blake that I would leave the Bipap in the room just on case she got SOB. I did advise Ms. Gloria Blake that she does have PRN breathing tx's if she does get SOB. I will administer quality respiratory care until properly relieved.

## 2022-09-11 LAB
ANION GAP SERPL CALC-SCNC: 7 MMOL/L (ref 3–18)
BUN SERPL-MCNC: 71 MG/DL (ref 7–18)
BUN/CREAT SERPL: 31 (ref 12–20)
CALCIUM SERPL-MCNC: 8.6 MG/DL (ref 8.5–10.1)
CHLORIDE SERPL-SCNC: 107 MMOL/L (ref 100–111)
CO2 SERPL-SCNC: 26 MMOL/L (ref 21–32)
CREAT SERPL-MCNC: 2.28 MG/DL (ref 0.6–1.3)
GLUCOSE BLD STRIP.AUTO-MCNC: 167 MG/DL (ref 70–110)
GLUCOSE BLD STRIP.AUTO-MCNC: 174 MG/DL (ref 70–110)
GLUCOSE BLD STRIP.AUTO-MCNC: 187 MG/DL (ref 70–110)
GLUCOSE BLD STRIP.AUTO-MCNC: 99 MG/DL (ref 70–110)
GLUCOSE SERPL-MCNC: 166 MG/DL (ref 74–99)
POTASSIUM SERPL-SCNC: 4.4 MMOL/L (ref 3.5–5.5)
SODIUM SERPL-SCNC: 140 MMOL/L (ref 136–145)

## 2022-09-11 PROCEDURE — 77010033678 HC OXYGEN DAILY

## 2022-09-11 PROCEDURE — 74011636637 HC RX REV CODE- 636/637: Performed by: INTERNAL MEDICINE

## 2022-09-11 PROCEDURE — 77030038269 HC DRN EXT URIN PURWCK BARD -A

## 2022-09-11 PROCEDURE — 74011000250 HC RX REV CODE- 250: Performed by: INTERNAL MEDICINE

## 2022-09-11 PROCEDURE — 82962 GLUCOSE BLOOD TEST: CPT

## 2022-09-11 PROCEDURE — 74011000258 HC RX REV CODE- 258: Performed by: INTERNAL MEDICINE

## 2022-09-11 PROCEDURE — 65660000004 HC RM CVT STEPDOWN

## 2022-09-11 PROCEDURE — 99232 SBSQ HOSP IP/OBS MODERATE 35: CPT | Performed by: HOSPITALIST

## 2022-09-11 PROCEDURE — 36415 COLL VENOUS BLD VENIPUNCTURE: CPT

## 2022-09-11 PROCEDURE — 74011250636 HC RX REV CODE- 250/636: Performed by: INTERNAL MEDICINE

## 2022-09-11 PROCEDURE — 80048 BASIC METABOLIC PNL TOTAL CA: CPT

## 2022-09-11 PROCEDURE — 74011250637 HC RX REV CODE- 250/637: Performed by: INTERNAL MEDICINE

## 2022-09-11 PROCEDURE — 99233 SBSQ HOSP IP/OBS HIGH 50: CPT | Performed by: INTERNAL MEDICINE

## 2022-09-11 PROCEDURE — 65270000046 HC RM TELEMETRY

## 2022-09-11 PROCEDURE — 74011636637 HC RX REV CODE- 636/637: Performed by: HOSPITALIST

## 2022-09-11 PROCEDURE — 2709999900 HC NON-CHARGEABLE SUPPLY

## 2022-09-11 PROCEDURE — 94762 N-INVAS EAR/PLS OXIMTRY CONT: CPT

## 2022-09-11 RX ORDER — ISOSORBIDE MONONITRATE 60 MG/1
60 TABLET, EXTENDED RELEASE ORAL DAILY
Status: DISCONTINUED | OUTPATIENT
Start: 2022-09-11 | End: 2022-09-14

## 2022-09-11 RX ADMIN — ATORVASTATIN CALCIUM 40 MG: 40 TABLET, FILM COATED ORAL at 21:15

## 2022-09-11 RX ADMIN — SODIUM CHLORIDE, PRESERVATIVE FREE 10 ML: 5 INJECTION INTRAVENOUS at 05:02

## 2022-09-11 RX ADMIN — FUROSEMIDE 40 MG: 10 INJECTION, SOLUTION INTRAMUSCULAR; INTRAVENOUS at 09:07

## 2022-09-11 RX ADMIN — AMLODIPINE BESYLATE 10 MG: 10 TABLET ORAL at 09:07

## 2022-09-11 RX ADMIN — Medication 3 UNITS: at 09:06

## 2022-09-11 RX ADMIN — CARVEDILOL 25 MG: 25 TABLET, FILM COATED ORAL at 09:07

## 2022-09-11 RX ADMIN — OXYCODONE HYDROCHLORIDE AND ACETAMINOPHEN 1 TABLET: 5; 325 TABLET ORAL at 09:07

## 2022-09-11 RX ADMIN — DOXYCYCLINE 100 MG: 100 INJECTION, POWDER, LYOPHILIZED, FOR SOLUTION INTRAVENOUS at 09:07

## 2022-09-11 RX ADMIN — Medication 3 UNITS: at 21:55

## 2022-09-11 RX ADMIN — Medication 7 UNITS: at 09:06

## 2022-09-11 RX ADMIN — WATER 2 G: 1 INJECTION INTRAMUSCULAR; INTRAVENOUS; SUBCUTANEOUS at 11:49

## 2022-09-11 RX ADMIN — ASPIRIN 81 MG CHEWABLE TABLET 81 MG: 81 TABLET CHEWABLE at 09:07

## 2022-09-11 RX ADMIN — SODIUM CHLORIDE, PRESERVATIVE FREE 10 ML: 5 INJECTION INTRAVENOUS at 21:15

## 2022-09-11 RX ADMIN — HYDRALAZINE HYDROCHLORIDE 100 MG: 50 TABLET, FILM COATED ORAL at 09:07

## 2022-09-11 RX ADMIN — HYDRALAZINE HYDROCHLORIDE 100 MG: 50 TABLET, FILM COATED ORAL at 21:15

## 2022-09-11 RX ADMIN — GABAPENTIN 400 MG: 400 CAPSULE ORAL at 09:07

## 2022-09-11 NOTE — PROGRESS NOTES
Problem: Falls - Risk of  Goal: *Absence of Falls  Description: Document Katerina Small Fall Risk and appropriate interventions in the flowsheet. Outcome: Progressing Towards Goal  Note: Fall Risk Interventions:  Mobility Interventions: Bed/chair exit alarm, Patient to call before getting OOB, Strengthening exercises (ROM-active/passive)         Medication Interventions: Bed/chair exit alarm, Evaluate medications/consider consulting pharmacy, Patient to call before getting OOB    Elimination Interventions: Call light in reach, Bed/chair exit alarm, Patient to call for help with toileting needs, Toilet paper/wipes in reach, Toileting schedule/hourly rounds              Problem: Patient Education: Go to Patient Education Activity  Goal: Patient/Family Education  Outcome: Progressing Towards Goal     Problem: Pressure Injury - Risk of  Goal: *Prevention of pressure injury  Description: Document Vladislav Scale and appropriate interventions in the flowsheet.   Outcome: Progressing Towards Goal  Note: Pressure Injury Interventions:  Sensory Interventions: Assess changes in LOC, Check visual cues for pain, Keep linens dry and wrinkle-free    Moisture Interventions: Absorbent underpads, Internal/External urinary devices    Activity Interventions: Pressure redistribution bed/mattress(bed type)    Mobility Interventions: Float heels, HOB 30 degrees or less, Pressure redistribution bed/mattress (bed type)    Nutrition Interventions: Document food/fluid/supplement intake, Offer support with meals,snacks and hydration                     Problem: Patient Education: Go to Patient Education Activity  Goal: Patient/Family Education  Outcome: Progressing Towards Goal     Problem: Patient Education: Go to Patient Education Activity  Goal: Patient/Family Education  Outcome: Progressing Towards Goal     Problem: Heart Failure: Day 3  Goal: Off Pathway (Use only if patient is Off Pathway)  Outcome: Progressing Towards Goal  Goal: Activity/Safety  Outcome: Progressing Towards Goal  Goal: Diagnostic Test/Procedures  Outcome: Progressing Towards Goal  Goal: Nutrition/Diet  Outcome: Progressing Towards Goal  Goal: Discharge Planning  Outcome: Progressing Towards Goal  Goal: Medications  Outcome: Progressing Towards Goal  Goal: Respiratory  Outcome: Progressing Towards Goal  Goal: Treatments/Interventions/Procedures  Outcome: Progressing Towards Goal  Goal: Psychosocial  Outcome: Progressing Towards Goal  Goal: *Oxygen saturation within defined limits  Outcome: Progressing Towards Goal  Goal: *Hemodynamically stable  Outcome: Progressing Towards Goal  Goal: *Optimal pain control at patient's stated goal  Outcome: Progressing Towards Goal  Goal: *Anxiety reduced or absent  Outcome: Progressing Towards Goal  Goal: *Demonstrates progressive activity  Outcome: Progressing Towards Goal     Problem: Pain  Goal: *Control of Pain  Outcome: Progressing Towards Goal     Problem: Patient Education: Go to Patient Education Activity  Goal: Patient/Family Education  Outcome: Progressing Towards Goal

## 2022-09-11 NOTE — PROGRESS NOTES
[facility]  Progress Note    Patient: Rubio Pablo Age: 81 y.o. : 1950 MR#: [id number] SSN: [id number]  Date/Time: 2022     Subjective:     74-year-old female with a history of chronic diastolic CHF, COPD presents to the hospital secondary to shortness of breath. Patient noted to have elevated blood pressure in the emergency room, started on nitro gtt. Patient admitted to stepdown unit for further evaluation. Nitro drip has been discontinued per cardiology. Patient seen and examined at bedside, she states she still having intermittent chest pain. Shortness of breath improved. Patient states she is not on home oxygen. Patient declined Lovenox as she reports vaginal bleeding when she takes it. She does not have an OB/GYN doctor in the community. IV diuretics stopped. Creatinine has increased to 2.28. Assessment/Plan:     Hypertensive urgency-blood pressure improving, off nitro gtt. , continue hydralazine and Coreg, start p.o. amlodipine. Avoid ACE ARB given CARA. IV diuretics held. Acute on chronic diastolic CHF exacerbation. Diuretics held. Check BMP tomorrow morning. History of type 2 diabetes-continue Lantus and insulin sliding scale, monitor blood sugars  History of COPD-continue bronchodilator therapy  Obesity Body mass index is 38.62 kg/m². History of CKD 3-monitor creatinine in the setting of diuresis  Right basilar infiltrate likely asymmetric edema. Pneumonia unlikely. Stop antibiotics. Postmenopausal vaginal bleeding. Close outpatient follow-up with OB/GYN. DVT prophylaxis-Lovenox  Full code. Transfer telemetry. PT OT. Assess for home oxygen tomorrow morning.       Katia Lala MD  22      Case discussed with:  [x]Patient  []Family  [x]Nursing  []Case Management  DVT Prophylaxis:  [x]Lovenox  []Hep SQ  []SCDs  []Coumadin   []On Heparin gtt    Objective:   VS: Visit Vitals  /73   Pulse 79   Temp 97.8 °F (36.6 °C)   Resp 17   Ht 5' 4\" (1.626 m)   Wt 102.1 kg (225 lb)   SpO2 97%   BMI 38.62 kg/m²        Tmax/24hrs: Temp (24hrs), Av.2 °F (36.8 °C), Min:97.8 °F (36.6 °C), Max:98.5 °F (36.9 °C)  IOBRIEF  Intake/Output Summary (Last 24 hours) at 2022 1848  Last data filed at 2022 1043  Gross per 24 hour   Intake 497.5 ml   Output 800 ml   Net -302.5 ml         General:  Alert, cooperative, no acute distress    Pulmonary: Decreased breath sounds in bases bilaterally  Cardiovascular: Regular rate and Rhythm. GI:  Soft, Non distended, Non tender. + Bowel sounds. Extremities:  No edema, cyanosis, clubbing. No calf tenderness. Neurologic: Alert and oriented X 3. No acute neuro deficits.   Additional:    Medications:   Current Facility-Administered Medications   Medication Dose Route Frequency    isosorbide mononitrate ER (IMDUR) tablet 60 mg  60 mg Oral DAILY    cefTRIAXone (ROCEPHIN) 2 g in sterile water (preservative free) 20 mL IV syringe  2 g IntraVENous Q24H    doxycycline (VIBRAMYCIN) 100 mg in 0.9% sodium chloride (MBP/ADV) 100 mL MBP  100 mg IntraVENous Q12H    albuterol-ipratropium (DUO-NEB) 2.5 MG-0.5 MG/3 ML  3 mL Nebulization Q6H PRN    carvediloL (COREG) tablet 25 mg  25 mg Oral BID WITH MEALS    [Held by provider] furosemide (LASIX) tablet 20 mg  20 mg Oral DAILY    hydrALAZINE (APRESOLINE) tablet 100 mg  100 mg Oral TID    amLODIPine (NORVASC) tablet 10 mg  10 mg Oral DAILY    aspirin chewable tablet 81 mg  81 mg Oral DAILY    atorvastatin (LIPITOR) tablet 40 mg  40 mg Oral QHS    gabapentin (NEURONTIN) capsule 400 mg  400 mg Oral DAILY    sodium chloride (NS) flush 5-40 mL  5-40 mL IntraVENous Q8H    sodium chloride (NS) flush 5-40 mL  5-40 mL IntraVENous PRN    acetaminophen (TYLENOL) tablet 650 mg  650 mg Oral Q6H PRN    Or    acetaminophen (TYLENOL) suppository 650 mg  650 mg Rectal Q6H PRN    polyethylene glycol (MIRALAX) packet 17 g  17 g Oral DAILY PRN    ondansetron (ZOFRAN ODT) tablet 4 mg 4 mg Oral Q8H PRN    Or    ondansetron (ZOFRAN) injection 4 mg  4 mg IntraVENous Q6H PRN    enoxaparin (LOVENOX) injection 40 mg  40 mg SubCUTAneous DAILY    insulin lispro (HUMALOG) injection   SubCUTAneous AC&HS    glucose chewable tablet 16 g  16 g Oral PRN    glucagon (GLUCAGEN) injection 1 mg  1 mg IntraMUSCular PRN    dextrose 10% infusion 0-250 mL  0-250 mL IntraVENous PRN    insulin glargine (LANTUS) injection 7 Units  7 Units SubCUTAneous DAILY    naloxone (NARCAN) injection 0.4 mg  0.4 mg IntraVENous EVERY 2 MINUTES AS NEEDED    oxyCODONE-acetaminophen (PERCOCET) 5-325 mg per tablet 1 Tablet  1 Tablet Oral Q4H PRN       Imaging:   XR Results (most recent):  Results from Hospital Encounter encounter on 09/09/22    XR CHEST PORT    Narrative  EXAM:  XR CHEST PORT    INDICATION:   SOB    COMPARISON: 9/5/2022. FINDINGS:  Stable mildly enlarged cardiac silhouette. Improved pulmonary vascular  congestion. Aortic atherosclerosis. Background of mildly increased interstitial  prominence. Right basilar hazy airspace opacity silhouetting vasculatures. No  confluent consolidation. No pneumothorax or pleural effusion. Intact osseous  structures. Impression  Stable enlarged cardiac silhouette with improved pulmonary vascular congestion. Right basilar infiltrates that could represent pneumonia versus asymmetric  edema. CT Results (most recent):  Results from Hospital Encounter encounter on 08/08/22    CTA CHEST W OR W WO CONT    Narrative  CT CHEST PULMONARY EMBOLISM PROTOCOL    CPT code: 66752    INDICATION: Dyspnea. Covid positive. Question pulmonary embolism.     TECHNIQUE: 2.7NC collimation helical images obtained through the level of the  pulmonary arteries with additional imaging through the chest following the  uneventful administration of 72 cc's nonionic intravenous contrast.  - Images reconstructed into three dimensional coronal and sagittal projections  for complete evaluation of the tortuous and overlapping pulmonary vascular  structures and to reduce patient radiation dose. All CT scans at this facility are performed using dose optimization technique as  appropriate to this specific exam, to include automated exposure control,  adjustment of the mA and/or KP according to patient size or use of iterative  reconstruction techniques. COMPARISON: Prior CT 7/8/2022    FINDINGS:  Opacification of the pulmonary arteries is adequate. No filling defects are appreciated within the main, left, right, lobar or  visualized segmental pulmonary arteries to suggest embolism. Main pulmonary artery is enlarged, measuring up to 3.8 cm diameter, similar to  the prior exam. Findings can be seen in setting of pulmonary artery  hypertension. Heart size top normal. No pericardial effusion. Nonenlarged aorta. Atherosclerotic calcification in the arch and at level of  proximal arch branch vessels. Geographic areas of mildly increased groundglass attenuation in the right upper  lobe fairly diffuse, without consolidation. Less extensive changes in the  superior aspect of the right middle lobe. Similar findings in the superior  segment right lower lobe and ventral to central right lower lobe. -Dependent peripheral atelectasis/consolidation subsegmental right lower lobe  adjacent to a small effusion. Similar appearing subsegmental dependent atelectasis left lower lobe adjacent to  a trace effusion. Mild degree atherosclerotic narrowing of the proximal SMA. Impression  1. No CT evidence for central pulmonary embolism.  -Similar enlargement of central pulmonary arteries suggest pulmonary artery  hypertension. 2. Multifocal geographic and peribronchial groundglass airspace infiltrates more  significant in the right lung than left. Imaging appearance is not specifically  characteristic of Covid pneumonia and could reflect viral inflammation or less  likely developing asymmetric pulmonary edema.     3. Multisegmental dependent atelectasis/consolidation right lower lobe adjacent  to a small effusion.  -Similar but less extensive subsegmental atelectasis adjacent to trace left  effusion. 06/25/22    ECHO ADULT COMPLETE 06/29/2022 6/29/2022    Interpretation Summary  Formatting of this result is different from the original.      Left Ventricle: Mildly reduced left ventricular systolic function with a visually estimated EF of 50 - 55%. Left ventricle is mildly dilated. Increased wall thickness. Findings consistent with mild concentric hypertrophy. See diagram for wall motion findings. Diastolic dysfunction present with normal LV EF. Mitral Valve: Mildly thickened leaflet. Mild regurgitation with a centrally directed jet. Signed by: Haydee Lopes MD on 6/29/2022 12:12 PM       MRI Results (most recent):  No results found for this or any previous visit.         Labs:    Recent Results (from the past 48 hour(s))   GLUCOSE, POC    Collection Time: 09/09/22  9:21 PM   Result Value Ref Range    Glucose (POC) 287 (H) 70 - 405 mg/dL   METABOLIC PANEL, BASIC    Collection Time: 09/10/22  5:49 AM   Result Value Ref Range    Sodium 139 136 - 145 mmol/L    Potassium 5.1 3.5 - 5.5 mmol/L    Chloride 107 100 - 111 mmol/L    CO2 26 21 - 32 mmol/L    Anion gap 6 3.0 - 18 mmol/L    Glucose 157 (H) 74 - 99 mg/dL    BUN 59 (H) 7.0 - 18 MG/DL    Creatinine 2.01 (H) 0.6 - 1.3 MG/DL    BUN/Creatinine ratio 29 (H) 12 - 20      GFR est AA 30 (L) >60 ml/min/1.73m2    GFR est non-AA 24 (L) >60 ml/min/1.73m2    Calcium 9.6 8.5 - 10.1 MG/DL   CBC WITH AUTOMATED DIFF    Collection Time: 09/10/22  5:49 AM   Result Value Ref Range    WBC 10.4 4.6 - 13.2 K/uL    RBC 3.58 (L) 4.20 - 5.30 M/uL    HGB 10.1 (L) 12.0 - 16.0 g/dL    HCT 32.8 (L) 35.0 - 45.0 %    MCV 91.6 78.0 - 100.0 FL    MCH 28.2 24.0 - 34.0 PG    MCHC 30.8 (L) 31.0 - 37.0 g/dL    RDW 17.4 (H) 11.6 - 14.5 %    PLATELET 495 149 - 791 K/uL    MPV 9.7 9.2 - 11.8 FL    NRBC 4.9 (H) 0  WBC    ABSOLUTE NRBC 0.51 (H) 0.00 - 0.01 K/uL    NEUTROPHILS 78 (H) 40 - 73 %    LYMPHOCYTES 9 (L) 21 - 52 %    MONOCYTES 8 3 - 10 %    EOSINOPHILS 0 0 - 5 %    BASOPHILS 0 0 - 2 %    IMMATURE GRANULOCYTES 5 (H) 0.0 - 0.5 %    ABS. NEUTROPHILS 8.2 (H) 1.8 - 8.0 K/UL    ABS. LYMPHOCYTES 0.9 0.9 - 3.6 K/UL    ABS. MONOCYTES 0.8 0.05 - 1.2 K/UL    ABS. EOSINOPHILS 0.0 0.0 - 0.4 K/UL    ABS. BASOPHILS 0.0 0.0 - 0.1 K/UL    ABS. IMM.  GRANS. 0.5 (H) 0.00 - 0.04 K/UL    DF SMEAR SCANNED      PLATELET COMMENTS ADEQUATE PLATELETS      RBC COMMENTS NORMOCYTIC, NORMOCHROMIC      RBC COMMENTS FEW NRBC PRESENT    TROPONIN-HIGH SENSITIVITY    Collection Time: 09/10/22  5:49 AM   Result Value Ref Range    Troponin-High Sensitivity 40 0 - 54 ng/L   MAGNESIUM    Collection Time: 09/10/22  5:49 AM   Result Value Ref Range    Magnesium 1.8 1.6 - 2.6 mg/dL   GLUCOSE, POC    Collection Time: 09/10/22  8:50 AM   Result Value Ref Range    Glucose (POC) 280 (H) 70 - 110 mg/dL   GLUCOSE, POC    Collection Time: 09/10/22 11:11 AM   Result Value Ref Range    Glucose (POC) 245 (H) 70 - 110 mg/dL   GLUCOSE, POC    Collection Time: 09/10/22  4:08 PM   Result Value Ref Range    Glucose (POC) 148 (H) 70 - 110 mg/dL   GLUCOSE, POC    Collection Time: 09/10/22  9:37 PM   Result Value Ref Range    Glucose (POC) 246 (H) 70 - 110 mg/dL   GLUCOSE, POC    Collection Time: 09/11/22  9:00 AM   Result Value Ref Range    Glucose (POC) 187 (H) 70 - 613 mg/dL   METABOLIC PANEL, BASIC    Collection Time: 09/11/22 10:23 AM   Result Value Ref Range    Sodium 140 136 - 145 mmol/L    Potassium 4.4 3.5 - 5.5 mmol/L    Chloride 107 100 - 111 mmol/L    CO2 26 21 - 32 mmol/L    Anion gap 7 3.0 - 18 mmol/L    Glucose 166 (H) 74 - 99 mg/dL    BUN 71 (H) 7.0 - 18 MG/DL    Creatinine 2.28 (H) 0.6 - 1.3 MG/DL    BUN/Creatinine ratio 31 (H) 12 - 20      GFR est AA 26 (L) >60 ml/min/1.73m2    GFR est non-AA 21 (L) >60 ml/min/1.73m2    Calcium 8.6 8.5 - 10.1 MG/DL   GLUCOSE, POC    Collection Time: 09/11/22 12:04 PM   Result Value Ref Range    Glucose (POC) 99 70 - 110 mg/dL       Signed By: Kyree Schwarz MD     September 11, 2022      I spent 25 minutes with the patient in face-to-face consultation, of which greater than 50% was spent in counseling and coordination of care as described above    Disclaimer: Sections of this note are dictated using utilizing voice recognition software. Minor typographical errors may be present. If questions arise, please do not hesitate to contact me or call our department.

## 2022-09-11 NOTE — PROGRESS NOTES
Bedside and Verbal shift change report received from [facility] RN(offgoing nurse). Report included the following information SBAR, Kardex, Intake/Output, Recent Results, and Cardiac Rhythm NSR .      1950: AOX4, on 2l 02 via NC, resting in bed talking on phone; denies any pain or other discomforts at this time; v/s monitored; shift assessment done; with ongoing Nitro drip infusing well at 10 mcg/minute; no needs voiced at this time    2104: due meds given    2156: ; 6 units Humalog sc given for coverage    2350: reassessment done; no changes noted from previous assessment; no needs voiced    0200: sleeping comfortably    0312: assisted with bathroom use; had a medium amount of stools    0600: resting in bed watching tv    0700: Bedside and Verbal shift change report given to [facility] RN (oncoming nurse) by Bal Angelo RN (offgoing nurse). Report included the following information SBAR, Kardex, Recent Results, and Cardiac Rhythm NSR .       Wound Prevention Checklist    Patient: Velasquez Vaughn (38 y.o. female)  Date: 9/11/2022  Diagnosis: Acute respiratory failure with hypoxia ([facility]) [J96.01]  [facility]est[facility] [I50.9] <principal problem not specified>    Precautions:         []  Heel prevention boots placed on patient    []  Patient turned q2h during shift    []  Lift team ordered    [x]  Patient on Kelly bed/Specialty bed    []  Each Wound is documented during shift (Stage, Color, drainage, odor, measurements, and dressings)    [x]  Dual skin checks done at bedside during shift report with [facility] RN    Bal Angelo, RN

## 2022-09-11 NOTE — PROGRESS NOTES
Cardiology Progress Note    Admit Date: 9/9/2022  Attending Cardiologist: Dr. Nery Campbell     Assessment:       -Acute hypoxic respiratory failure in setting of below, s/p bipap. Improved. -Flash pulmonary edema in setting of severely elevated blood pressures.   -Chest pain, atypical with pleuritic component. MI r/o with serial negative cardiac enzymes. No acute ischemic changes on ECG. -Hypertensive crisis, Max /121 mmHg. Improved. -a/c HFpEF. Last Echo in June 2022 with normal LVEF.   -CAD, s/p cardiac cath 10/2019 with findings as follows:  LM: Angiographically normal  LAD: Previously placed ostial to proximal LAD stent is widely patent. D1 with ostial 50% stenosis. -CKD  -Anemia  -HTN  -DM  -Hypercholesterolemia, on statin  -COPD        Primary cardiologist is Dr. Gilma June:     -IV NTG gtt scheduled to be discontinued today, start long acting po nitrates. Continue Amlodipine, Hydralazine. Pending renal function, would consider adding ACEi/ARB if further BP control is needed.   -continued on IV diuretics as renal function allows, repeat BMP for today has been ordered. Monitor strict I/os and electrolytes. -wean O2 as tolerated. -increase activity as tolerated. -Continue ASA and statin. Subjective:     Remains SOB but markedly improved since admission. Denies CP.      Objective:      Patient Vitals for the past 8 hrs:   Temp Pulse Resp BP SpO2   09/11/22 0813 97.9 °F (36.6 °C) 68 17 (!) 143/65 98 %   09/11/22 0300 98.4 °F (36.9 °C) 73 15 (!) 141/59 96 %           Patient Vitals for the past 96 hrs:   Weight   09/11/22 0312 102.1 kg (225 lb)   09/10/22 0301 99.8 kg (220 lb)   09/09/22 0742 110.7 kg (244 lb)         TELE: SR               Current Facility-Administered Medications   Medication Dose Route Frequency Last Admin    cefTRIAXone (ROCEPHIN) 2 g in sterile water (preservative free) 20 mL IV syringe  2 g IntraVENous Q24H 2 g at 09/10/22 1003    doxycycline (VIBRAMYCIN) 100 mg in 0.9% sodium chloride (MBP/ADV) 100 mL MBP  100 mg IntraVENous Q12H 100 mg at 09/11/22 0907    albuterol-ipratropium (DUO-NEB) 2.5 MG-0.5 MG/3 ML  3 mL Nebulization Q6H PRN      carvediloL (COREG) tablet 25 mg  25 mg Oral BID WITH MEALS 25 mg at 09/11/22 0907    [Held by provider] furosemide (LASIX) tablet 20 mg  20 mg Oral DAILY      nitroGLYcerin (TRIDIL) 400 mcg/ml infusion  10 mcg/min IntraVENous CONTINUOUS 10 mcg/min at 09/10/22 0913    hydrALAZINE (APRESOLINE) tablet 100 mg  100 mg Oral  mg at 09/11/22 0907    amLODIPine (NORVASC) tablet 10 mg  10 mg Oral DAILY 10 mg at 09/11/22 0907    furosemide (LASIX) injection 40 mg  40 mg IntraVENous BID 40 mg at 09/11/22 1584    aspirin chewable tablet 81 mg  81 mg Oral DAILY 81 mg at 09/11/22 0907    atorvastatin (LIPITOR) tablet 40 mg  40 mg Oral QHS 40 mg at 09/10/22 2104    gabapentin (NEURONTIN) capsule 400 mg  400 mg Oral DAILY 400 mg at 09/11/22 2058    sodium chloride (NS) flush 5-40 mL  5-40 mL IntraVENous Q8H 10 mL at 09/11/22 0502    sodium chloride (NS) flush 5-40 mL  5-40 mL IntraVENous PRN      acetaminophen (TYLENOL) tablet 650 mg  650 mg Oral Q6H  mg at 09/09/22 2002    Or    acetaminophen (TYLENOL) suppository 650 mg  650 mg Rectal Q6H PRN      polyethylene glycol (MIRALAX) packet 17 g  17 g Oral DAILY PRN      ondansetron (ZOFRAN ODT) tablet 4 mg  4 mg Oral Q8H PRN      Or    ondansetron (ZOFRAN) injection 4 mg  4 mg IntraVENous Q6H PRN      enoxaparin (LOVENOX) injection 40 mg  40 mg SubCUTAneous DAILY 40 mg at 09/10/22 0912    insulin lispro (HUMALOG) injection   SubCUTAneous AC&HS 3 Units at 09/11/22 0906    glucose chewable tablet 16 g  16 g Oral PRN      glucagon (GLUCAGEN) injection 1 mg  1 mg IntraMUSCular PRN      dextrose 10% infusion 0-250 mL  0-250 mL IntraVENous PRN      insulin glargine (LANTUS) injection 7 Units  7 Units SubCUTAneous DAILY 7 Units at 09/11/22 0906    naloxone (NARCAN) injection 0.4 mg  0.4 mg IntraVENous EVERY 2 MINUTES AS NEEDED      oxyCODONE-acetaminophen (PERCOCET) 5-325 mg per tablet 1 Tablet  1 Tablet Oral Q4H PRN 1 Tablet at 09/11/22 0907         Intake/Output Summary (Last 24 hours) at 9/11/2022 0913  Last data filed at 9/11/2022 0649  Gross per 24 hour   Intake 497.5 ml   Output 1800 ml   Net -1302.5 ml         Physical Exam:  General:  alert, cooperative, no distress, appears stated age  Neck:  no JVD  Lungs:  few bibasilar rales   Heart:  regular rate and rhythm, S1, S2 normal, no murmur, click, rub or gallop  Abdomen:  abdomen is soft without significant tenderness, masses, organomegaly or guarding  Extremities:  extremities normal, atraumatic, no cyanosis or edema    Visit Vitals  BP (!) 143/65   Pulse 68   Temp 97.9 °F (36.6 °C)   Resp 17   Ht 5' 4\" (1.626 m)   Wt 102.1 kg (225 lb)   SpO2 98%   BMI 38.62 kg/m²       Data Review:     Labs: Results:       Chemistry Recent Labs     09/10/22  0549 09/09/22  0734   * 110*    141   K 5.1 4.6    112*   CO2 26 23   BUN 59* 51*   CREA 2.01* 1.83*   CA 9.6 9.5   MG 1.8  --    AGAP 6 6   BUCR 29* 28*   AP  --  116   TP  --  6.6   ALB  --  3.3*   GLOB  --  3.3   AGRAT  --  1.0        CBC w/Diff Recent Labs     09/10/22  0549 09/09/22  0734   WBC 10.4 10.1   RBC 3.58* 3.37*   HGB 10.1* 9.7*   HCT 32.8* 31.4*    327   GRANS 78* 66   LYMPH 9* 22   EOS 0 0        Cardiac Enzymes No results found for: CPK, CK, CKMMB, CKMB, RCK3, CKMBT, CKNDX, CKND1, GODFREY, TROPT, TROIQ, SANG, TROPT, TNIPOC, BNP, BNPP   Coagulation Recent Labs     09/09/22  0754   PTP 13.5   INR 1.0         Lipid Panel Lab Results   Component Value Date/Time    Cholesterol, total 152 10/10/2019 12:00 AM    HDL Cholesterol 46 10/10/2019 12:00 AM    LDL, calculated 86 10/10/2019 12:00 AM    VLDL, calculated 20 10/10/2019 12:00 AM    Triglyceride 101 10/10/2019 12:00 AM    CHOL/HDL Ratio 5.6 (H) 05/21/2018 06:23 AM      BNP No results found for: BNP, BNPP, XBNPT   Liver Enzymes Recent Labs 09/09/22  0734   TP 6.6   ALB 3.3*           Thyroid Studies Lab Results   Component Value Date/Time    TSH 0.21 (L) 03/19/2021 04:00 AM          Signed By: Paradise Pedroza PA-C     September 11, 2022

## 2022-09-12 ENCOUNTER — APPOINTMENT (OUTPATIENT)
Dept: GENERAL RADIOLOGY | Age: 72
DRG: 291 | End: 2022-09-12
Attending: HOSPITALIST
Payer: MEDICARE

## 2022-09-12 LAB
ANION GAP SERPL CALC-SCNC: 9 MMOL/L (ref 3–18)
BUN SERPL-MCNC: 80 MG/DL (ref 7–18)
BUN/CREAT SERPL: 33 (ref 12–20)
CALCIUM SERPL-MCNC: 8.7 MG/DL (ref 8.5–10.1)
CHLORIDE SERPL-SCNC: 107 MMOL/L (ref 100–111)
CO2 SERPL-SCNC: 22 MMOL/L (ref 21–32)
CREAT SERPL-MCNC: 2.46 MG/DL (ref 0.6–1.3)
GLUCOSE BLD STRIP.AUTO-MCNC: 141 MG/DL (ref 70–110)
GLUCOSE BLD STRIP.AUTO-MCNC: 178 MG/DL (ref 70–110)
GLUCOSE BLD STRIP.AUTO-MCNC: 184 MG/DL (ref 70–110)
GLUCOSE BLD STRIP.AUTO-MCNC: 380 MG/DL (ref 70–110)
GLUCOSE SERPL-MCNC: 161 MG/DL (ref 74–99)
MAGNESIUM SERPL-MCNC: 1.7 MG/DL (ref 1.6–2.6)
PHOSPHATE SERPL-MCNC: 4.6 MG/DL (ref 2.5–4.9)
POTASSIUM SERPL-SCNC: 4.8 MMOL/L (ref 3.5–5.5)
SODIUM SERPL-SCNC: 138 MMOL/L (ref 136–145)

## 2022-09-12 PROCEDURE — 74011000250 HC RX REV CODE- 250: Performed by: INTERNAL MEDICINE

## 2022-09-12 PROCEDURE — 74011250637 HC RX REV CODE- 250/637: Performed by: PHYSICIAN ASSISTANT

## 2022-09-12 PROCEDURE — 74011636637 HC RX REV CODE- 636/637: Performed by: HOSPITALIST

## 2022-09-12 PROCEDURE — 74011250636 HC RX REV CODE- 250/636: Performed by: PHYSICIAN ASSISTANT

## 2022-09-12 PROCEDURE — 74011250637 HC RX REV CODE- 250/637: Performed by: INTERNAL MEDICINE

## 2022-09-12 PROCEDURE — 97535 SELF CARE MNGMENT TRAINING: CPT

## 2022-09-12 PROCEDURE — 80048 BASIC METABOLIC PNL TOTAL CA: CPT

## 2022-09-12 PROCEDURE — 71045 X-RAY EXAM CHEST 1 VIEW: CPT

## 2022-09-12 PROCEDURE — 65270000046 HC RM TELEMETRY

## 2022-09-12 PROCEDURE — 77030018842 HC SOL IRR SOD CL 9% BAXT -A

## 2022-09-12 PROCEDURE — 36415 COLL VENOUS BLD VENIPUNCTURE: CPT

## 2022-09-12 PROCEDURE — 2709999900 HC NON-CHARGEABLE SUPPLY

## 2022-09-12 PROCEDURE — 99232 SBSQ HOSP IP/OBS MODERATE 35: CPT | Performed by: INTERNAL MEDICINE

## 2022-09-12 PROCEDURE — 74011636637 HC RX REV CODE- 636/637: Performed by: INTERNAL MEDICINE

## 2022-09-12 PROCEDURE — 82962 GLUCOSE BLOOD TEST: CPT

## 2022-09-12 PROCEDURE — 83735 ASSAY OF MAGNESIUM: CPT

## 2022-09-12 PROCEDURE — 99232 SBSQ HOSP IP/OBS MODERATE 35: CPT | Performed by: HOSPITALIST

## 2022-09-12 PROCEDURE — 84100 ASSAY OF PHOSPHORUS: CPT

## 2022-09-12 PROCEDURE — 74011250636 HC RX REV CODE- 250/636: Performed by: HOSPITALIST

## 2022-09-12 PROCEDURE — 97165 OT EVAL LOW COMPLEX 30 MIN: CPT

## 2022-09-12 PROCEDURE — 99223 1ST HOSP IP/OBS HIGH 75: CPT | Performed by: INTERNAL MEDICINE

## 2022-09-12 RX ORDER — IPRATROPIUM BROMIDE AND ALBUTEROL SULFATE 2.5; .5 MG/3ML; MG/3ML
3 SOLUTION RESPIRATORY (INHALATION)
Status: DISCONTINUED | OUTPATIENT
Start: 2022-09-12 | End: 2022-09-22 | Stop reason: HOSPADM

## 2022-09-12 RX ORDER — SODIUM CHLORIDE 9 MG/ML
75 INJECTION, SOLUTION INTRAVENOUS CONTINUOUS
Status: DISPENSED | OUTPATIENT
Start: 2022-09-12 | End: 2022-09-12

## 2022-09-12 RX ORDER — MAGNESIUM SULFATE HEPTAHYDRATE 40 MG/ML
2 INJECTION, SOLUTION INTRAVENOUS ONCE
Status: COMPLETED | OUTPATIENT
Start: 2022-09-12 | End: 2022-09-12

## 2022-09-12 RX ADMIN — Medication 7 UNITS: at 09:29

## 2022-09-12 RX ADMIN — OXYCODONE HYDROCHLORIDE AND ACETAMINOPHEN 1 TABLET: 5; 325 TABLET ORAL at 22:45

## 2022-09-12 RX ADMIN — GABAPENTIN 400 MG: 400 CAPSULE ORAL at 09:27

## 2022-09-12 RX ADMIN — CARVEDILOL 25 MG: 25 TABLET, FILM COATED ORAL at 17:16

## 2022-09-12 RX ADMIN — OXYCODONE HYDROCHLORIDE AND ACETAMINOPHEN 1 TABLET: 5; 325 TABLET ORAL at 09:27

## 2022-09-12 RX ADMIN — HYDRALAZINE HYDROCHLORIDE 100 MG: 50 TABLET, FILM COATED ORAL at 09:27

## 2022-09-12 RX ADMIN — HYDRALAZINE HYDROCHLORIDE 100 MG: 50 TABLET, FILM COATED ORAL at 17:16

## 2022-09-12 RX ADMIN — ATORVASTATIN CALCIUM 40 MG: 40 TABLET, FILM COATED ORAL at 22:39

## 2022-09-12 RX ADMIN — Medication 3 UNITS: at 17:15

## 2022-09-12 RX ADMIN — AMLODIPINE BESYLATE 10 MG: 10 TABLET ORAL at 09:27

## 2022-09-12 RX ADMIN — OXYCODONE HYDROCHLORIDE AND ACETAMINOPHEN 1 TABLET: 5; 325 TABLET ORAL at 15:54

## 2022-09-12 RX ADMIN — METHYLPREDNISOLONE SODIUM SUCCINATE 80 MG: 125 INJECTION, POWDER, FOR SOLUTION INTRAMUSCULAR; INTRAVENOUS at 15:48

## 2022-09-12 RX ADMIN — SODIUM CHLORIDE 75 ML/HR: 9 INJECTION, SOLUTION INTRAVENOUS at 11:04

## 2022-09-12 RX ADMIN — SODIUM CHLORIDE, PRESERVATIVE FREE 10 ML: 5 INJECTION INTRAVENOUS at 22:39

## 2022-09-12 RX ADMIN — SODIUM CHLORIDE, PRESERVATIVE FREE 10 ML: 5 INJECTION INTRAVENOUS at 05:14

## 2022-09-12 RX ADMIN — SODIUM CHLORIDE, PRESERVATIVE FREE 10 ML: 5 INJECTION INTRAVENOUS at 15:49

## 2022-09-12 RX ADMIN — ASPIRIN 81 MG CHEWABLE TABLET 81 MG: 81 TABLET CHEWABLE at 09:27

## 2022-09-12 RX ADMIN — MAGNESIUM SULFATE HEPTAHYDRATE 2 G: 40 INJECTION, SOLUTION INTRAVENOUS at 11:03

## 2022-09-12 RX ADMIN — ISOSORBIDE MONONITRATE 60 MG: 60 TABLET, EXTENDED RELEASE ORAL at 09:27

## 2022-09-12 RX ADMIN — HYDRALAZINE HYDROCHLORIDE 100 MG: 50 TABLET, FILM COATED ORAL at 22:39

## 2022-09-12 RX ADMIN — CARVEDILOL 25 MG: 25 TABLET, FILM COATED ORAL at 09:27

## 2022-09-12 RX ADMIN — Medication 3 UNITS: at 12:06

## 2022-09-12 RX ADMIN — Medication 15 UNITS: at 22:39

## 2022-09-12 RX ADMIN — METHYLPREDNISOLONE SODIUM SUCCINATE 80 MG: 125 INJECTION, POWDER, FOR SOLUTION INTRAMUSCULAR; INTRAVENOUS at 22:39

## 2022-09-12 NOTE — PROGRESS NOTES
Bedside and Verbal shift change report received from [facility] RN(offgoing nurse). Report included the following information SBAR, Kardex, Intake/Output, Recent Results, and Cardiac Rhythm NSR .      2002: AOX4, on 2L 02 saturating 97%; denies any pain, shortness of breath or other discomforts; NSR on tele; v/s monitored; shift assessment done    2115: due meds given    2155: ; 3 units Humalog given sc for coverage    2230: assisted to bathroom by staff; had a medium amount of stools    2303: reassessment done; no changes noted    0200: sleeping comfortably    0303: v/s checked; no needs voiced    0430: sleeping; NSR on tele    0509: had a medium amount of stools; assisted to and from bathroom; tolerated activity without shortness of breath    0600: lucian care done    0730: Bedside and Verbal shift change report given to William Hassan RN (oncoming nurse) by Kalie Deleon RN (offgoing nurse). Report included the following information NSR.       Wound Prevention Checklist    Patient: Oswaldo Rodriguez (50 y.o. female)  Date: 9/12/2022  Diagnosis: Acute respiratory failure with hypoxia ([facility]) [J96.01]  [facility] [I50.9] <principal problem not specified>    Precautions:         []  Heel prevention boots placed on patient    []  Patient turned q2h during shift    []  Lift team ordered    [x]  Patient on Kelly bed/Specialty bed    []  Each Wound is documented during shift (Stage, Color, drainage, odor, measurements, and dressings)    [x]  Dual skin checks done at bedside during shift report with William Deleon RN

## 2022-09-12 NOTE — DIABETES MGMT
Diabetes/ Glycemic Control Plan of Care  Recommendations:   Blood glucose this am 141 mg/dl  Will continue inpatient monitoring. Assessment:   DX:   1. Acute respiratory failure with hypoxia (HCC)           Fasting/ Morning blood glucose:   Lab Results   Component Value Date/Time    Glucose 161 (H) 09/12/2022 05:09 AM    Glucose (POC) 184 (H) 09/12/2022 11:54 AM     IV Fluids containing dextrose: none   Steroids:  none     Blood glucose values:        Latest Reference Range & Units 9/11/22 12:04 9/11/22 15:45 9/11/22 21:38 9/12/22 08:16 9/12/22 11:54   GLUCOSE,FAST - POC 70 - 110 mg/dL 99 167 (H) 174 (H) 141 (H) 184 (H)   (H): Data is abnormally high  Within target range (70-180mg/dL):  progressing   Current insulin orders:   Lantus 7 units daily  Lispro corrective insulin coverage AC&HS  Total Daily Dose previous 24 hours = 13 units   Current A1c:   Lab Results   Component Value Date/Time    Hemoglobin A1c 7.2 (H) 06/29/2022 03:12 AM      Equivalent  to an average Blood Glucose of 160 mg/dl for 2-3 months prior to admission  Adequate glycemic control PTA: moderate  Nutrition/Diet:   Active Orders   Diet    ADULT DIET Regular; 4 carb choices (60 gm/meal); Low Sodium (2 gm)      Meal Intake:  Patient Vitals for the past 168 hrs:   % Diet Eaten   09/11/22 1043 51 - 75%   09/10/22 1003 51 - 75%   09/09/22 1857 76 - 100%     Supplement Intake:  No data found. Home diabetes medications:   Key Antihyperglycemic Medications               insulin detemir (LEVEMIR) 100 unit/mL injection 15 units daily for diabetes          Plan/Goals:   Blood glucose will be within target of 70 - 180 mg/dl within 72 hours  Reinforce dietary and medication compliance at home.         Education:  [] Refer to Diabetes Education Record                       [x] Education not indicated at this time     Raya Brambila RN 1 [facility]  Ext [phone]

## 2022-09-12 NOTE — PROGRESS NOTES
0725 Received report from [facility]. Patient alert and oriented x 4. Patient able to ambulate to the bathroom. Call bell and telephone placed within reach.     Wound Prevention Checklist    Patient: James Velasco (52 y.o. female)  Date: 9/12/2022  Diagnosis: Acute respiratory failure with hypoxia (HCC) [J96.01]  CHF (congestive heart failure) ([facility] [address] [I50.9] <principal problem not specified>    Precautions: Fall       []  Heel prevention boots placed on patient    []  Patient turned q2h during shift    []  Lift team ordered    []  Patient on Robbins bed/Specialty bed    []  Each Wound is documented during shift (Stage, Color, drainage, odor, measurements, and dressings)    [x]  Dual skin checks done at bedside during shift report with Heide Peace, RN

## 2022-09-12 NOTE — PROGRESS NOTES
Problem: Self Care Deficits Care Plan (Adult)  Goal: *Acute Goals and Plan of Care (Insert Text)  Outcome: Resolved/Met     OCCUPATIONAL THERAPY EVALUATION/DISCHARGE    Patient: Agata Vargas (15 y.o. female)  Date: 9/12/2022  Primary Diagnosis: Acute respiratory failure with hypoxia ([facility] [address] [J96.01]  CHF (congestive heart failure) ([facility] [address] [I50.9]  Precautions:   Fall  PLOF: Patient was independent with self-care and functional mobility PTA. ASSESSMENT AND RECOMMENDATIONS:  Patient cleared to participate in OT evaluation by RN. Upon entering the room, patient was supine in bed, alert, and agreeable to participate in OT evaluation. Patient educated on the role of OT, evaluation process, and safety during this admission with patient verbalizing understanding. Patient is modified independent - supervision with basic self-care, modified independent with bed mobility and supervision with functional transfers using rolling walker in preparation for ADLs. Patient reported no SOB but feeling dizzy following ambulating to restroom, /63. Patient educated on energy conservation techniques such as pursed lip breathing and keeping eyes open with focus on steady object. Patient back in bed per request. Patient encouraged to participate in OOB activity throughout the day with staff members and sitting in up chair at least 3x/day to maximize PLOF. Based on the objective data described below, the patient presents with no deficits that impede pt function with ADLs. The patient presents with good static standing and fair dynamic standing balance, however will defer to PT for functional balance and functional mobility tasks. At this time patient is safe to d/c home with family support from selfcare standpoint. OT to d/c from caseload. Vitals this session      Vitals   BP   O2   HR   Pre session 129/92 97%  89 bpm          Skilled occupational therapy is not indicated at this time.     Further Equipment Recommendations for Discharge: continue use of personal rolling walker    AMPAC: Based on an AM-PAC score of 21/24 and their current ADL deficits; it is recommended that the patient have 2-3 sessions per week of Home Health Occupational Therapy at d/c to assess/increase the patient's independence within the home setting. This AMPAC score should be considered in conjunction with interdisciplinary team recommendations to determine the most appropriate discharge setting. Patient's social support, diagnosis, medical stability, and prior level of function should also be taken into consideration. SUBJECTIVE:   Patient stated  I feel dizzy.     OBJECTIVE DATA SUMMARY:     Past Medical History:   Diagnosis Date    Abnormal WBC count 5/17/2016    Anemia     Bilateral shoulder pain 9/27/2016    Chondromalacia of both patellae     COPD (chronic obstructive pulmonary disease) ([facility] [address] 9/2015    mild-mod dz; Dr Renee Winslow    Diabetes [facility]) 2013    Diabetic eye exam ([facility] [address] 2016    Dilated cardiomyopathy ([facility] [address]     Dyslipidemia     Gout     Heart attack ([facility] [address] 10/18/2019    History of echocardiogram 11/14/2014    Mild LVE. EF 40%. Mild, diffuse hypk. Mild LAE. Mild MR.       Hypercholesteremia 1/08/14    Hypertension 2000    Noncompliance with medications 2/16/2016    Obesity     Orthostatic hypotension 5/17/2016    Osteoarthritis of both knees     Pain management 04/01/2016    Dr. Poole Sat     Popliteal cyst, bilateral      Stage 3b chronic kidney disease ([facility] [address] 8/8/2022    Vitamin D deficiency 10/16/14     Past Surgical History:   Procedure Laterality Date    HX HEART CATHETERIZATION      HX TUBAL LIGATION       Barriers to Learning/Limitations: None  Compensate with: visual, verbal, tactile, kinesthetic cues/model    Home Situation:   Home Situation  Home Environment: Private residence  # Steps to Enter: 4  One/Two Story Residence: One story  Living Alone: No  Support Systems: Spouse/Significant Other  Current DME Used/Available at Home: Cane, straight, Walker, rolling, Shower chair  Tub or Shower Type: Tub/Shower combination  [x]     Right hand dominant   []     Left hand dominant    Cognitive/Behavioral Status:  Neurologic State: Alert  Orientation Level: Oriented X4  Cognition: Follows commands  Safety/Judgement: Fall prevention    Skin: intact  Edema: none noted    Vision/Perceptual:                           Acuity: Within Defined Limits    Corrective Lenses: Glasses    Coordination: BUE     Fine Motor Skills-Upper: Left Intact; Right Intact    Gross Motor Skills-Upper: Left Intact; Right Intact    Balance:  Sitting: Intact  Standing: Impaired  Standing - Static: Good  Standing - Dynamic : Fair    Strength: BUE    Strength: Generally decreased, functional              Tone & Sensation: BUE    Tone: Normal  Sensation: Intact                    Range of Motion: BUE    AROM: Within functional limits                         Functional Mobility and Transfers for ADLs:  Bed Mobility:     Supine to Sit: Modified independent  Sit to Supine: Modified independent  Scooting: Modified independent  Transfers:  Sit to Stand: Supervision  Stand to Sit: Supervision                Toilet Transfer : Supervision           Bathroom Mobility: Contact guard assistance;Supervision/set up (CGA with no AD, Supervision with RW)    ADL Assessment:  Feeding: Independent    Oral Facial Hygiene/Grooming: Modified Independent    Bathing: Modified independent;Supervision    Upper Body Dressing: Modified independent    Lower Body Dressing: Modified independent;Supervision    Toileting: Supervision         ADL Intervention:       Grooming  Grooming Assistance: Modified independent  Position Performed: Standing  Washing Hands: Modified independent    Upper Body Dressing Assistance  Dressing Assistance: Modified independent  Hospital Gown: Modified independent    Toileting  Toileting Assistance: Supervision  Bladder Hygiene: Supervision    Cognitive Retraining  Safety/Judgement: Fall prevention    Pain:  Pain level pre-treatment: 10/10 ,back  Pain level post-treatment: 10/10   Pain Intervention(s): Medication (see MAR); Rest, Ice, Repositioning   Response to intervention: Nurse notified, See doc flow    Activity Tolerance:   Fair +      Please refer to the flowsheet for vital signs taken during this treatment. After treatment:   []  Patient left in no apparent distress sitting up in chair  [x]  Patient left in no apparent distress in bed  [x]  Call bell left within reach  [x]  Nursing notified  []  Caregiver present  []  Bed alarm activated    COMMUNICATION/EDUCATION:   [x]      Role of Occupational Therapy in the acute care setting  [x]      Home safety education was provided and the patient/caregiver indicated understanding. [x]      Patient/family have participated as able and agree with findings and recommendations. []      Patient is unable to participate in plan of care at this time. Thank you for this referral.  Nora Daniels OTR/L  Time Calculation: 19 mins      Eval Complexity: History: MEDIUM Complexity : Expanded review of history including physical, cognitive and psychosocial  history ; Examination: LOW Complexity : 1-3 performance deficits relating to physical, cognitive , or psychosocial skils that result in activity limitations and / or participation restrictions ; Decision Making:LOW Complexity : No comorbidities that affect functional and no verbal or physical assistance needed to complete eval tasks     [facility]-PAC® Daily Activity Inpatient Short Form (6-Clicks)*    How much HELP from another person does the patient currently need    (If the patient hasn't done an activity recently, how much help from another person do you think he/she would need if he/she tried?)   Total (Total A or Dep)   A Lot  (Mod to Max A)   A Little (Sup or Min A)   None (Mod I to I)   Putting on and taking off regular lower body clothing? [] 1 [] 2 [x] 3 [] 4   2. Bathing (including washing, rinsing,      drying)? [] 1 [] 2 [x] 3 [] 4   3. Toileting, which includes using toilet, bedpan or urinal?   [] 1 [] 2 [x] 3 [] 4   4. Putting on and taking off regular upper body clothing? [] 1 [] 2 [] 3 [x] 4   5. Taking care of personal grooming such as brushing teeth? [] 1 [] 2 [] 3 [x] 4   6. Eating meals?    [] 1 [] 2 [] 3 [x] 4

## 2022-09-12 NOTE — PROGRESS NOTES
21  Patient ambulated to the front of her room and back in the bed. Oxygen saturations in the 94-96% on room air with activity.

## 2022-09-12 NOTE — PROGRESS NOTES
Cardiology Progress Note    Admit Date: 9/9/2022  Attending Cardiologist: Dr. Dax Bolanos:       -Acute hypoxic respiratory failure in setting of below, s/p bipap. Improved. -Flash pulmonary edema in setting of severely elevated blood pressures.   -Chest pain, atypical with pleuritic component. MI r/o with serial negative cardiac enzymes. No acute ischemic changes on ECG. -Hypertensive crisis, Max /121 mmHg. Improved. -a/c HFpEF. Last Echo in June 2022 with normal LVEF.   -CAD, s/p cardiac cath 10/2019 with findings as follows:  LM: Angiographically normal  LAD: Previously placed ostial to proximal LAD stent is widely patent. D1 with ostial 50% stenosis. -CKD  -Anemia  -HTN  -DM  -Hypercholesterolemia, on statin  -COPD        Primary cardiologist is Dr. Shalonda Orellana    Plan:     Addendum: Independently seen and evaluated. Agree with below. Her coronary status appears quite stable. She needs education on compliance for blood pressure control medication. Would consider renal arterial duplex scan to rule out CLEVE as cause for flash pulmonary edema. -BP markedly improved. Continue po blood pressure regimen. No acei/arb d/t renal function. -IV diuretics held for worsening renal indices. Hold maintenance diuretics for now. Will give gentle IV hydration this am.   -Will order IV Mg replacement now, recommend maintaining Mg at 2.0.   -Continue ASA and statin for CAD. Subjective:     C/o dizziness with position changes this morning.      Objective:      Patient Vitals for the past 8 hrs:   Temp Pulse Resp BP SpO2   09/12/22 0900 -- 90 18 137/63 95 %   09/12/22 0800 98.2 °F (36.8 °C) 82 14 (!) 129/92 93 %   09/12/22 0700 -- 87 19 (!) 156/69 100 %   09/12/22 0303 98.8 °F (37.1 °C) 89 19 122/61 96 %           Patient Vitals for the past 96 hrs:   Weight   09/12/22 0509 100.7 kg (222 lb)   09/11/22 0312 102.1 kg (225 lb)   09/10/22 0301 99.8 kg (220 lb)   09/09/22 0742 110.7 kg (244 lb)         TELE: SR               Current Facility-Administered Medications   Medication Dose Route Frequency Last Admin    isosorbide mononitrate ER (IMDUR) tablet 60 mg  60 mg Oral DAILY 60 mg at 09/12/22 0927    albuterol-ipratropium (DUO-NEB) 2.5 MG-0.5 MG/3 ML  3 mL Nebulization Q6H PRN      carvediloL (COREG) tablet 25 mg  25 mg Oral BID WITH MEALS 25 mg at 09/12/22 0927    [Held by provider] furosemide (LASIX) tablet 20 mg  20 mg Oral DAILY      hydrALAZINE (APRESOLINE) tablet 100 mg  100 mg Oral  mg at 09/12/22 0927    amLODIPine (NORVASC) tablet 10 mg  10 mg Oral DAILY 10 mg at 09/12/22 3614    aspirin chewable tablet 81 mg  81 mg Oral DAILY 81 mg at 09/12/22 0927    atorvastatin (LIPITOR) tablet 40 mg  40 mg Oral QHS 40 mg at 09/11/22 2115    gabapentin (NEURONTIN) capsule 400 mg  400 mg Oral DAILY 400 mg at 09/12/22 5073    sodium chloride (NS) flush 5-40 mL  5-40 mL IntraVENous Q8H 10 mL at 09/12/22 0514    sodium chloride (NS) flush 5-40 mL  5-40 mL IntraVENous PRN      acetaminophen (TYLENOL) tablet 650 mg  650 mg Oral Q6H  mg at 09/09/22 2002    Or    acetaminophen (TYLENOL) suppository 650 mg  650 mg Rectal Q6H PRN      polyethylene glycol (MIRALAX) packet 17 g  17 g Oral DAILY PRN      ondansetron (ZOFRAN ODT) tablet 4 mg  4 mg Oral Q8H PRN      Or    ondansetron (ZOFRAN) injection 4 mg  4 mg IntraVENous Q6H PRN      enoxaparin (LOVENOX) injection 40 mg  40 mg SubCUTAneous DAILY 40 mg at 09/10/22 0912    insulin lispro (HUMALOG) injection   SubCUTAneous AC&HS 3 Units at 09/11/22 2155    glucose chewable tablet 16 g  16 g Oral PRN      glucagon (GLUCAGEN) injection 1 mg  1 mg IntraMUSCular PRN      dextrose 10% infusion 0-250 mL  0-250 mL IntraVENous PRN      insulin glargine (LANTUS) injection 7 Units  7 Units SubCUTAneous DAILY 7 Units at 09/12/22 0929    naloxone (NARCAN) injection 0.4 mg  0.4 mg IntraVENous EVERY 2 MINUTES AS NEEDED      oxyCODONE-acetaminophen (PERCOCET) 5-325 mg per tablet 1 Tablet  1 Tablet Oral Q4H PRN 1 Tablet at 09/12/22 0927         Intake/Output Summary (Last 24 hours) at 9/12/2022 1012  Last data filed at 9/12/2022 0509  Gross per 24 hour   Intake 360 ml   Output 600 ml   Net -240 ml         Physical Exam:  General:  alert, cooperative, no distress, appears stated age  Neck:  no JVD  Lungs:  few bibasilar rales   Heart:  regular rate and rhythm, S1, S2 normal, no murmur, click, rub or gallop  Abdomen:  abdomen is soft without significant tenderness, masses, organomegaly or guarding  Extremities:  extremities normal, atraumatic, no cyanosis or edema    Visit Vitals  /63   Pulse 90   Temp 98.2 °F (36.8 °C)   Resp 18   Ht 5' 4\" (1.626 m)   Wt 100.7 kg (222 lb)   SpO2 95%   BMI 38.11 kg/m²       Data Review:     Labs: Results:       Chemistry Recent Labs     09/12/22  0509 09/11/22  1023 09/10/22  0549   * 166* 157*    140 139   K 4.8 4.4 5.1    107 107   CO2 22 26 26   BUN 80* 71* 59*   CREA 2.46* 2.28* 2.01*   CA 8.7 8.6 9.6   MG 1.7  --  1.8   PHOS 4.6  --   --    AGAP 9 7 6   BUCR 33* 31* 29*        CBC w/Diff Recent Labs     09/10/22  0549   WBC 10.4   RBC 3.58*   HGB 10.1*   HCT 32.8*      GRANS 78*   LYMPH 9*   EOS 0        Cardiac Enzymes No results found for: CPK, CK, CKMMB, CKMB, RCK3, CKMBT, CKNDX, CKND1, GODFREY, TROPT, TROIQ, SANG, TROPT, TNIPOC, BNP, BNPP   Coagulation No results for input(s): PTP, INR, APTT, INREXT, INREXT in the last 72 hours. Lipid Panel Lab Results   Component Value Date/Time    Cholesterol, total 152 10/10/2019 12:00 AM    HDL Cholesterol 46 10/10/2019 12:00 AM    LDL, calculated 86 10/10/2019 12:00 AM    VLDL, calculated 20 10/10/2019 12:00 AM    Triglyceride 101 10/10/2019 12:00 AM    CHOL/HDL Ratio 5.6 (H) 05/21/2018 06:23 AM      BNP No results found for: BNP, BNPP, XBNPT   Liver Enzymes No results for input(s): TP, ALB, TBIL, AP in the last 72 hours.     No lab exists for component: SGOT, GPT, DBIL     Thyroid Studies Lab Results   Component Value Date/Time    TSH 0.21 (L) 03/19/2021 04:00 AM          Signed By: Akash Nesbitt PA-C     September 12, 2022

## 2022-09-12 NOTE — PROGRESS NOTES
[facility]  [facility]    Patient: Kathia Carvajal Age: 81 y.o. : 1950 MR#: [id number] SSN: [id number]  Date/Time: 2022     Subjective:     79-year-old female with a history of chronic diastolic CHF, COPD presents to the hospital secondary to shortness of breath. Patient noted to have elevated blood pressure in the emergency room, started on nitro gtt. Patient admitted to stepdown unit for further evaluation. Patient seen and examined at bedside, she reports when walking for  home oxygen test she experienced chest pain. She reports ongoing shortness of breath, dyspnea on exertion, and orthopnea. States that she was wheezing overnight. She quit smoking 3 to 4 years ago. Denies nausea vomiting diarrhea constipation, hematuria, hematochezia, dysuria. Assessment/Plan:     Hypertensive urgency-blood pressure improving, off nitro gtt. , continue hydralazine and Coreg, start p.o. amlodipine. Avoid ACE ARB given CARA. IV diuretics held. Acute on chronic diastolic CHF exacerbation. Diuretics held. Check BMP tomorrow morning. History of type 2 diabetes-continue Lantus and insulin sliding scale, monitor blood sugars  History of COPD-continue bronchodilator therapy  Obesity Body mass index is 38.11 kg/m². History of CKD 3-monitor creatinine in the setting of diuresis  Right basilar infiltrate likely asymmetric edema. Pneumonia unlikely. Stop antibiotics. Postmenopausal vaginal bleeding. Close outpatient follow-up with OB/GYN. Mild to moderate COPD with acute exacerbation. Steroids. Bronchodilators. RT for bronchial hygiene protocol. Pulmonology consult. Former tobacco.  Suspect obstructive sleep apnea. DVT prophylaxis-Lovenox  Full code. Transfer telemetry. PT pending. Did well w/ OT. Patient did not qualify for home oxygen 2022. Discussed on IDR.     Dahlia Cueva MD  22      Case discussed with:  [x]Patient  []Family [x]Nursing  [x]Case Management  DVT Prophylaxis:  [x]Lovenox  []Hep SQ  []SCDs  []Coumadin   []On Heparin gtt    Objective:   VS: Visit Vitals  /63   Pulse 90   Temp 98.2 °F (36.8 °C)   Resp 18   Ht 5' 4\" (1.626 m)   Wt 100.7 kg (222 lb)   SpO2 95%   BMI 38.11 kg/m²        Tmax/24hrs: Temp (24hrs), Av.4 °F (36.9 °C), Min:97.8 °F (36.6 °C), Max:99 °F (37.2 °C)  IOBRIEF  Intake/Output Summary (Last 24 hours) at 2022 1003  Last data filed at 2022 0509  Gross per 24 hour   Intake 360 ml   Output 600 ml   Net -240 ml         General:  Alert, cooperative, no acute distress    Pulmonary: Attenuated breath sounds. Scattered expiratory wheeze bilaterally  Cardiovascular: Regular rate and Rhythm. GI:  Soft, Non distended, Non tender. Normoactive bowel sounds. Extremities:  No edema, cyanosis, clubbing. No calf tenderness. Neurologic: Alert and oriented X 4. No acute neuro deficits. Additional: No rash to visible skin.     Medications:   Current Facility-Administered Medications   Medication Dose Route Frequency    isosorbide mononitrate ER (IMDUR) tablet 60 mg  60 mg Oral DAILY    albuterol-ipratropium (DUO-NEB) 2.5 MG-0.5 MG/3 ML  3 mL Nebulization Q6H PRN    carvediloL (COREG) tablet 25 mg  25 mg Oral BID WITH MEALS    [Held by provider] furosemide (LASIX) tablet 20 mg  20 mg Oral DAILY    hydrALAZINE (APRESOLINE) tablet 100 mg  100 mg Oral TID    amLODIPine (NORVASC) tablet 10 mg  10 mg Oral DAILY    aspirin chewable tablet 81 mg  81 mg Oral DAILY    atorvastatin (LIPITOR) tablet 40 mg  40 mg Oral QHS    gabapentin (NEURONTIN) capsule 400 mg  400 mg Oral DAILY    sodium chloride (NS) flush 5-40 mL  5-40 mL IntraVENous Q8H    sodium chloride (NS) flush 5-40 mL  5-40 mL IntraVENous PRN    acetaminophen (TYLENOL) tablet 650 mg  650 mg Oral Q6H PRN    Or    acetaminophen (TYLENOL) suppository 650 mg  650 mg Rectal Q6H PRN    polyethylene glycol (MIRALAX) packet 17 g  17 g Oral DAILY PRN    ondansetron (ZOFRAN ODT) tablet 4 mg  4 mg Oral Q8H PRN    Or    ondansetron (ZOFRAN) injection 4 mg  4 mg IntraVENous Q6H PRN    enoxaparin (LOVENOX) injection 40 mg  40 mg SubCUTAneous DAILY    insulin lispro (HUMALOG) injection   SubCUTAneous AC&HS    glucose chewable tablet 16 g  16 g Oral PRN    glucagon (GLUCAGEN) injection 1 mg  1 mg IntraMUSCular PRN    dextrose 10% infusion 0-250 mL  0-250 mL IntraVENous PRN    insulin glargine (LANTUS) injection 7 Units  7 Units SubCUTAneous DAILY    naloxone (NARCAN) injection 0.4 mg  0.4 mg IntraVENous EVERY 2 MINUTES AS NEEDED    oxyCODONE-acetaminophen (PERCOCET) 5-325 mg per tablet 1 Tablet  1 Tablet Oral Q4H PRN       Imaging:   XR Results (most recent):  Results from Hospital Encounter encounter on 09/09/22    XR CHEST PORT    Narrative  EXAM:  XR CHEST PORT    INDICATION:   SOB    COMPARISON: 9/5/2022. FINDINGS:  Stable mildly enlarged cardiac silhouette. Improved pulmonary vascular  congestion. Aortic atherosclerosis. Background of mildly increased interstitial  prominence. Right basilar hazy airspace opacity silhouetting vasculatures. No  confluent consolidation. No pneumothorax or pleural effusion. Intact osseous  structures. Impression  Stable enlarged cardiac silhouette with improved pulmonary vascular congestion. Right basilar infiltrates that could represent pneumonia versus asymmetric  edema. CT Results (most recent):  Results from Hospital Encounter encounter on 08/08/22    CTA CHEST W OR W WO CONT    Narrative  CT CHEST PULMONARY EMBOLISM PROTOCOL    CPT code: 83415    INDICATION: Dyspnea. Covid positive. Question pulmonary embolism.     TECHNIQUE: 7.8VK collimation helical images obtained through the level of the  pulmonary arteries with additional imaging through the chest following the  uneventful administration of 72 cc's nonionic intravenous contrast.  - Images reconstructed into three dimensional coronal and sagittal projections  for complete evaluation of the tortuous and overlapping pulmonary vascular  structures and to reduce patient radiation dose. All CT scans at this facility are performed using dose optimization technique as  appropriate to this specific exam, to include automated exposure control,  adjustment of the mA and/or KP according to patient size or use of iterative  reconstruction techniques. COMPARISON: Prior CT 7/8/2022    FINDINGS:  Opacification of the pulmonary arteries is adequate. No filling defects are appreciated within the main, left, right, lobar or  visualized segmental pulmonary arteries to suggest embolism. Main pulmonary artery is enlarged, measuring up to 3.8 cm diameter, similar to  the prior exam. Findings can be seen in setting of pulmonary artery  hypertension. Heart size top normal. No pericardial effusion. Nonenlarged aorta. Atherosclerotic calcification in the arch and at level of  proximal arch branch vessels. Geographic areas of mildly increased groundglass attenuation in the right upper  lobe fairly diffuse, without consolidation. Less extensive changes in the  superior aspect of the right middle lobe. Similar findings in the superior  segment right lower lobe and ventral to central right lower lobe. -Dependent peripheral atelectasis/consolidation subsegmental right lower lobe  adjacent to a small effusion. Similar appearing subsegmental dependent atelectasis left lower lobe adjacent to  a trace effusion. Mild degree atherosclerotic narrowing of the proximal SMA. Impression  1. No CT evidence for central pulmonary embolism.  -Similar enlargement of central pulmonary arteries suggest pulmonary artery  hypertension. 2. Multifocal geographic and peribronchial groundglass airspace infiltrates more  significant in the right lung than left.  Imaging appearance is not specifically  characteristic of Covid pneumonia and could reflect viral inflammation or less  likely developing asymmetric pulmonary edema. 3. Multisegmental dependent atelectasis/consolidation right lower lobe adjacent  to a small effusion.  -Similar but less extensive subsegmental atelectasis adjacent to trace left  effusion. 06/25/22    ECHO ADULT COMPLETE 06/29/2022 6/29/2022    Interpretation Summary  Formatting of this result is different from the original.      Left Ventricle: Mildly reduced left ventricular systolic function with a visually estimated EF of 50 - 55%. Left ventricle is mildly dilated. Increased wall thickness. Findings consistent with mild concentric hypertrophy. See diagram for wall motion findings. Diastolic dysfunction present with normal LV EF. Mitral Valve: Mildly thickened leaflet. Mild regurgitation with a centrally directed jet. Signed by: Stew Minor MD on 6/29/2022 12:12 PM       MRI Results (most recent):  No results found for this or any previous visit.         Labs:    Recent Results (from the past 48 hour(s))   GLUCOSE, POC    Collection Time: 09/10/22 11:11 AM   Result Value Ref Range    Glucose (POC) 245 (H) 70 - 110 mg/dL   GLUCOSE, POC    Collection Time: 09/10/22  4:08 PM   Result Value Ref Range    Glucose (POC) 148 (H) 70 - 110 mg/dL   GLUCOSE, POC    Collection Time: 09/10/22  9:37 PM   Result Value Ref Range    Glucose (POC) 246 (H) 70 - 110 mg/dL   GLUCOSE, POC    Collection Time: 09/11/22  9:00 AM   Result Value Ref Range    Glucose (POC) 187 (H) 70 - 866 mg/dL   METABOLIC PANEL, BASIC    Collection Time: 09/11/22 10:23 AM   Result Value Ref Range    Sodium 140 136 - 145 mmol/L    Potassium 4.4 3.5 - 5.5 mmol/L    Chloride 107 100 - 111 mmol/L    CO2 26 21 - 32 mmol/L    Anion gap 7 3.0 - 18 mmol/L    Glucose 166 (H) 74 - 99 mg/dL    BUN 71 (H) 7.0 - 18 MG/DL    Creatinine 2.28 (H) 0.6 - 1.3 MG/DL    BUN/Creatinine ratio 31 (H) 12 - 20      GFR est AA 26 (L) >60 ml/min/1.73m2    GFR est non-AA 21 (L) >60 ml/min/1.73m2    Calcium 8.6 8.5 - 10.1 MG/DL   GLUCOSE, POC Collection Time: 09/11/22 12:04 PM   Result Value Ref Range    Glucose (POC) 99 70 - 110 mg/dL   GLUCOSE, POC    Collection Time: 09/11/22  3:45 PM   Result Value Ref Range    Glucose (POC) 167 (H) 70 - 110 mg/dL   GLUCOSE, POC    Collection Time: 09/11/22  9:38 PM   Result Value Ref Range    Glucose (POC) 174 (H) 70 - 989 mg/dL   METABOLIC PANEL, BASIC    Collection Time: 09/12/22  5:09 AM   Result Value Ref Range    Sodium 138 136 - 145 mmol/L    Potassium 4.8 3.5 - 5.5 mmol/L    Chloride 107 100 - 111 mmol/L    CO2 22 21 - 32 mmol/L    Anion gap 9 3.0 - 18 mmol/L    Glucose 161 (H) 74 - 99 mg/dL    BUN 80 (H) 7.0 - 18 MG/DL    Creatinine 2.46 (H) 0.6 - 1.3 MG/DL    BUN/Creatinine ratio 33 (H) 12 - 20      GFR est AA 24 (L) >60 ml/min/1.73m2    GFR est non-AA 19 (L) >60 ml/min/1.73m2    Calcium 8.7 8.5 - 10.1 MG/DL   PHOSPHORUS    Collection Time: 09/12/22  5:09 AM   Result Value Ref Range    Phosphorus 4.6 2.5 - 4.9 MG/DL   MAGNESIUM    Collection Time: 09/12/22  5:09 AM   Result Value Ref Range    Magnesium 1.7 1.6 - 2.6 mg/dL   GLUCOSE, POC    Collection Time: 09/12/22  8:16 AM   Result Value Ref Range    Glucose (POC) 141 (H) 70 - 110 mg/dL       Signed By: Gilbert Rod MD     September 12, 2022      I spent 25 minutes with the patient in face-to-face consultation, of which greater than 50% was spent in counseling and coordination of care as described above    Disclaimer: Sections of this note are dictated using utilizing voice recognition software. Minor typographical errors may be present. If questions arise, please do not hesitate to contact me or call our department.

## 2022-09-12 NOTE — CONSULTS
[facility]  Pulmonary, Critical Care, and Sleep Medicine    Initial Patient Consult    Name: Rafiq Spann MRN: [id number]   : 1950 Hospital: [address] [place] [address] Dr   Date: 2022        IMPRESSION:   Wheezing in a former smoker possibly bronchospasm/undiagnosed COPD vs \"cardiac asthma\" in the setting of chronic diastolic heart failure, favor the latter Echo from  with EF 45% and mild to moderate mitral regurgitation  Acute hypoxic respiratory failure related to flash pulmonary edema. 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 Previously followed by Dr. Mercedes Sargent  DM   Acute on chronic kidney disease  Ground glass opacities on CT from August probably due to COVID vs heart failure  History of COPD although PFT in  consistent with restrictive lung disease  History of STEMI in 2018 requiring intubation and mechanical ventilation  Former smoker 46 PY, quit in 2019  Chronic pain syndrome  Obesity Body mass index is 38.11 kg/m². Recent COVID positive      RECOMMENDATIONS:   Prn bronchodilators on board   Titrate FiO2 to saturation greater than 90%  BIPAP HS and prn in light of prior TESSA diagnosis. Pt advised  Treatment of heart failure per primary and cardiology teams  Baseline PFT's and 6 minute walk test. Will arrange office follow up  Decision on maintenance therapy pending above tests  Repeat sleep studies with Dr. Mercedes Sargent  Pt meets criteria for low dose CT screen   Will follow with you. Thank you for consulting     Subjective: This patient has been seen and evaluated at the request of Dr. Rhys Lomeli for acute hypoxic respiratory failure. Patient is a 81 y.o. female former smoker who presented with progressive shortness of breath, orthopnea and pedal edema. She was managed for CHF decompensation and hypertensive crisis but was noted with wheezing, resulting in pulmonary consult. Pt was diagnosed with COVID pneumonia .  Other symptoms include orthopnea and pedal edema. Pt also complains of occasional chest tightness but no chest pain. Cough is non productive, no fever. Pt was diagnosed with COPD in 2015 during a hospital admission for respiratory failure but last PFT's in 2017 with restriction, not obstruction. She was diagnosed with TESSA by Dr. Paulino Aguillon, was on CPAP which was recalled and not resumed. Past Medical History:   Diagnosis Date    Abnormal WBC count 5/17/2016    Anemia     Bilateral shoulder pain 9/27/2016    Chondromalacia of both patellae     COPD (chronic obstructive pulmonary disease) ([facility] [address] 9/2015    mild-mod dz; Dr Richard Randhawa    Diabetes [facility]) 2013    Diabetic eye exam ([facility] [address] 2016    Dilated cardiomyopathy ([facility] [address]     Dyslipidemia     Gout     Heart attack ([facility] [address] 10/18/2019    History of echocardiogram 11/14/2014    Mild LVE. EF 40%. Mild, diffuse hypk. Mild LAE. Mild MR. Hypercholesteremia 1/08/14    Hypertension 2000    Noncompliance with medications 2/16/2016    Obesity     Orthostatic hypotension 5/17/2016    Osteoarthritis of both knees     Pain management 04/01/2016    Dr. Jerrod Montague     Popliteal cyst, bilateral      Stage 3b chronic kidney disease ([facility] [address] 8/8/2022    Vitamin D deficiency 10/16/14      Past Surgical History:   Procedure Laterality Date    HX HEART CATHETERIZATION      HX TUBAL LIGATION        Prior to Admission medications    Medication Sig Start Date End Date Taking? Authorizing Provider   doxycycline (MONODOX) 100 mg capsule Take 1 Capsule by mouth two (2) times a day for 7 days. 9/5/22 9/12/22  Vanessa MCCLAIN MD   furosemide (LASIX) 20 mg tablet Take 1 Tablet by mouth in the morning. 8/11/22   Celine Serna,    olodateroL (STRIVERDI) 2.5 mcg/actuation mist Take 2 Puffs by inhalation in the morning. 8/12/22   Celine Serna,    hydrALAZINE (APRESOLINE) 100 mg tablet Take 100 mg by mouth three (3) times daily.     Provider, Historical   gabapentin (NEURONTIN) 400 mg capsule Take 400 mg by mouth in the morning. Other, MD Tg   tiZANidine (ZANAFLEX) 4 mg tablet Take 4 mg by mouth two (2) times daily as needed for Muscle Spasm(s). Provider, Historical   calcitRIOL (ROCALTROL) 0.25 mcg capsule Take 1 Cap by mouth daily. 3/24/21   Pepe Faria MD   albuterol-ipratropium (DUO-NEB) 2.5 mg-0.5 mg/3 ml nebu 3 mL by Nebulization route every six (6) hours as needed for Wheezing. 3/23/21   Pepe Faria MD   atorvastatin (LIPITOR) 80 mg tablet Take 1 Tab by mouth daily. 21   Monique Bahena MD   carvediloL (COREG) 25 mg tablet Take 1 Tab by mouth two (2) times daily (with meals). 21   Monique Bahena MD   aspirin 81 mg chewable tablet Take 2 Tabs by mouth daily. 19   Kendra Rabago NP   insulin detemir (LEVEMIR) 100 unit/mL injection 15 units daily for diabetes 17   Misael Irizarry DNP   umeclidinium-vilanterol [facility] ELLIPTA) 62.5-25 mcg/actuation inhaler Take 1 Puff by inhalation daily.  For COPD 17   Carmen Irizarry DNP     No Known Allergies   Social History     Tobacco Use    Smoking status: Former     Packs/day: 0.25     Years: 48.00     Pack years: 12.00     Types: Cigarettes     Quit date: 2018     Years since quittin.3    Smokeless tobacco: Never   Substance Use Topics    Alcohol use: No     Alcohol/week: 0.0 standard drinks      Family History   Problem Relation Age of Onset    Diabetes Mother     Hypertension Mother     Hypertension Father     Kidney Disease Maternal Aunt 50        Dialysis        Current Facility-Administered Medications   Medication Dose Route Frequency    methylPREDNISolone (PF) (SOLU-MEDROL) injection 80 mg  80 mg IntraVENous Q12H    isosorbide mononitrate ER (IMDUR) tablet 60 mg  60 mg Oral DAILY    carvediloL (COREG) tablet 25 mg  25 mg Oral BID WITH MEALS    [Held by provider] furosemide (LASIX) tablet 20 mg  20 mg Oral DAILY    hydrALAZINE (APRESOLINE) tablet 100 mg  100 mg Oral TID    amLODIPine (NORVASC) tablet 10 mg  10 mg Oral DAILY    aspirin chewable tablet 81 mg  81 mg Oral DAILY    atorvastatin (LIPITOR) tablet 40 mg  40 mg Oral QHS    gabapentin (NEURONTIN) capsule 400 mg  400 mg Oral DAILY    sodium chloride (NS) flush 5-40 mL  5-40 mL IntraVENous Q8H    enoxaparin (LOVENOX) injection 40 mg  40 mg SubCUTAneous DAILY    insulin lispro (HUMALOG) injection   SubCUTAneous AC&HS    insulin glargine (LANTUS) injection 7 Units  7 Units SubCUTAneous DAILY       Review of Systems:  Pertinent items are noted in HPI. Objective:   Vital Signs:    Visit Vitals  /60   Pulse 89   Temp 98.5 °F (36.9 °C)   Resp 19   Ht 5' 4\" (1.626 m)   Wt 100.7 kg (222 lb)   SpO2 96%   BMI 38.11 kg/m²       O2 Device: None (Room air)   O2 Flow Rate (L/min): 2 l/min   Temp (24hrs), Av.5 °F (36.9 °C), Min:97.9 °F (36.6 °C), Max:99 °F (37.2 °C)       Intake/Output:   Last shift:       07 -  190  In: 480 [P.O.:480]  Out: -   Last 3 shifts: 09/10 1901 -  0700  In: 717.8 [P.O.:600; I.V.:117.8]  Out: 1400 [Urine:1400]    Intake/Output Summary (Last 24 hours) at 2022 1811  Last data filed at 2022 1246  Gross per 24 hour   Intake 720 ml   Output 600 ml   Net 120 ml      Physical Exam:   General:  Alert, cooperative, not in distress, appears stated age. obese   Head:  Normocephalic, without obvious abnormality, atraumatic. Eyes:  Conjunctivae/corneas clear. PERRL, EOMs intact. Nose: Nares normal. Mucosa normal. No drainage or sinus tenderness. Throat: Lips, mucosa, and tongue normal. Teeth and gums normal.   Neck: Supple, symmetrical, trachea midline, no adenopathy, thyroid: no enlargment/tenderness/nodules    Back:   Symmetric,    Lungs:   Clear to auscultation bilaterally. Chest wall:  No tenderness or deformity. Heart:  Regular rate and rhythm, S1, S2 normal, no murmur, click, rub or gallop. Abdomen:   Soft, non-tender. Bowel sounds normal. No masses,  No organomegaly.    Extremities: Extremities normal, atraumatic, no cyanosis, 1+ pitting edema   Pulses: 2+ and symmetric all extremities. Skin: Skin color, texture, turgor normal. No rashes or lesions   Lymph nodes: Cervical, supraclavicular nodes normal.   Neurologic: Grossly nonfocal     Data review:     Recent Results (from the past 24 hour(s))   GLUCOSE, POC    Collection Time: 09/11/22  9:38 PM   Result Value Ref Range    Glucose (POC) 174 (H) 70 - 255 mg/dL   METABOLIC PANEL, BASIC    Collection Time: 09/12/22  5:09 AM   Result Value Ref Range    Sodium 138 136 - 145 mmol/L    Potassium 4.8 3.5 - 5.5 mmol/L    Chloride 107 100 - 111 mmol/L    CO2 22 21 - 32 mmol/L    Anion gap 9 3.0 - 18 mmol/L    Glucose 161 (H) 74 - 99 mg/dL    BUN 80 (H) 7.0 - 18 MG/DL    Creatinine 2.46 (H) 0.6 - 1.3 MG/DL    BUN/Creatinine ratio 33 (H) 12 - 20      GFR est AA 24 (L) >60 ml/min/1.73m2    GFR est non-AA 19 (L) >60 ml/min/1.73m2    Calcium 8.7 8.5 - 10.1 MG/DL   PHOSPHORUS    Collection Time: 09/12/22  5:09 AM   Result Value Ref Range    Phosphorus 4.6 2.5 - 4.9 MG/DL   MAGNESIUM    Collection Time: 09/12/22  5:09 AM   Result Value Ref Range    Magnesium 1.7 1.6 - 2.6 mg/dL   GLUCOSE, POC    Collection Time: 09/12/22  8:16 AM   Result Value Ref Range    Glucose (POC) 141 (H) 70 - 110 mg/dL   GLUCOSE, POC    Collection Time: 09/12/22 11:54 AM   Result Value Ref Range    Glucose (POC) 184 (H) 70 - 110 mg/dL   GLUCOSE, POC    Collection Time: 09/12/22  4:28 PM   Result Value Ref Range    Glucose (POC) 178 (H) 70 - 110 mg/dL       Imaging:  I have personally reviewed the patients radiographs and have reviewed the reports:  XR Results (most recent):  Results from Hospital Encounter encounter on 09/09/22    XR CHEST PORT    Narrative  EXAM: XR CHEST PORT    CLINICAL INDICATION/HISTORY: 81 years Female. shortness of breath. thanks.   Additional History: None    TECHNIQUE: Frontal view of the chest    COMPARISON: 9/9/2022    FINDINGS:    Multiple devices project over the patient. The cardiac silhouette appears within  normal limits. Mild pulmonary vascular congestion, unchanged. Aortic  calcifications noted. Improved but incompletely resolved interstitial opacities at the right lung  base. No definite evidence of pleural effusion or pneumothorax. No acute osseous abnormality appreciated. Right-sided subacromial spur. Impression  1. Improved but incompletely resolved interstitial opacities at the right lung  base, which may reflect atelectasis, pneumonia, or edema. 2.  Unchanged mild pulmonary vascular congestion. CT Results (most recent):  Results from Hospital Encounter encounter on 08/08/22    CTA CHEST W OR W WO CONT    Narrative  CT CHEST PULMONARY EMBOLISM PROTOCOL    CPT code: 41019    INDICATION: Dyspnea. Covid positive. Question pulmonary embolism. TECHNIQUE: 7.1NX collimation helical images obtained through the level of the  pulmonary arteries with additional imaging through the chest following the  uneventful administration of 72 cc's nonionic intravenous contrast.  - Images reconstructed into three dimensional coronal and sagittal projections  for complete evaluation of the tortuous and overlapping pulmonary vascular  structures and to reduce patient radiation dose. All CT scans at this facility are performed using dose optimization technique as  appropriate to this specific exam, to include automated exposure control,  adjustment of the mA and/or KP according to patient size or use of iterative  reconstruction techniques. COMPARISON: Prior CT 7/8/2022    FINDINGS:  Opacification of the pulmonary arteries is adequate. No filling defects are appreciated within the main, left, right, lobar or  visualized segmental pulmonary arteries to suggest embolism. Main pulmonary artery is enlarged, measuring up to 3.8 cm diameter, similar to  the prior exam. Findings can be seen in setting of pulmonary artery  hypertension.     Heart size top normal. No pericardial effusion. Nonenlarged aorta. Atherosclerotic calcification in the arch and at level of  proximal arch branch vessels. Geographic areas of mildly increased groundglass attenuation in the right upper  lobe fairly diffuse, without consolidation. Less extensive changes in the  superior aspect of the right middle lobe. Similar findings in the superior  segment right lower lobe and ventral to central right lower lobe. -Dependent peripheral atelectasis/consolidation subsegmental right lower lobe  adjacent to a small effusion. Similar appearing subsegmental dependent atelectasis left lower lobe adjacent to  a trace effusion. Mild degree atherosclerotic narrowing of the proximal SMA. Impression  1. No CT evidence for central pulmonary embolism.  -Similar enlargement of central pulmonary arteries suggest pulmonary artery  hypertension. 2. Multifocal geographic and peribronchial groundglass airspace infiltrates more  significant in the right lung than left. Imaging appearance is not specifically  characteristic of Covid pneumonia and could reflect viral inflammation or less  likely developing asymmetric pulmonary edema. 3. Multisegmental dependent atelectasis/consolidation right lower lobe adjacent  to a small effusion.  -Similar but less extensive subsegmental atelectasis adjacent to trace left  effusion.           Polina Nowak MD

## 2022-09-12 NOTE — PROGRESS NOTES
Reason for Readmission:    Acute respiratory failure with hypoxia ([facility]) [J96.01]  CHF (congestive heart failure) ([facility]) [I50.9]         RUR Score and Risk Level:      29     Level of Readmission:    3   (1-3)   (1 - First Readmission, 2- Second Readmission within 30 days or multiple admissions over previous 90 days, 3- Greater than two readmissions within 30 days or multiple admissions over previous 90 days)      Care Conference scheduled:   (consider for all patient's with readmission level of 2, should definitely be scheduled for all patients with readmission level of 3)       Resources/supports as identified by patient/family:         Top Challenges facing patient (as identified by patient/family and CM): Finances/Medication cost?     No.   Transportation      pt's family  Support system or lack thereof? Family support  Living arrangements? Lives with    Self-care/ADLs/Cognition?   independent        Current Advanced Directive/Advance Care Plan:        Healthcare Decision Maker:   Primary Decision Maker: AnnamariaJefe brantley - Spouse - [phone]    Click here to complete [address] including selection of the Healthcare Decision Maker Relationship (ie \"Primary\")           Plan for utilizing home health:   pt is declining             Likelihood of additional readmission:                Transition of Care Plan:    Based on readmission, the patient's previous Plan of Care   has been evaluated and/or modified. The current Transition of Care Plan is:            Initial assessment completed with patient. Cognitive status of patient: oriented to time, place, person and situation. Face sheet information confirmed:  yes. The patient designates her  Monika Beck to participate in her discharge plan and to receive any needed information. This patient lives in a  home with . Patient is able to navigate steps as needed.   Prior to hospitalization, patient was considered to be independent with ADLs/IADLS : yes     Patient has a current ACP document on file: no  The  will be available to transport patient home upon discharge. The patient already has [address], [address], Rollator, Shower chair, BSC, Grab bars, medical equipment available in the home. Per pt, she does not need DMEs    Patient is not currently active with home health. Patient has not stayed in a skilled nursing facility or rehab. Was  stay within last 60 days : no. This patient is on dialysis :no    Currently, the discharge plan is Home. The patient states that she can obtain her medications from the pharmacy, and take her medications as directed. Patient's current insurance is home. Per pt, she will not need home health because she is able to care for herself. CM discussed Hospital to Home [facility]) for CHF with pt and she is still refusing. Patient's current insurance [employer] and Medicaid      Care Management Interventions  PCP Verified by CM: Yes  Palliative Care Criteria Met (RRAT>21 & CHF Dx)?: No  Mode of Transport at Discharge:  Other (see comment) (family)  Transition of Care Consult (CM Consult): Discharge Planning  Support Systems: Spouse/Significant Other  Confirm Follow Up Transport: Family  Discharge Location  Patient Expects to be Discharged to[de-identified] Home with family assistance      Readmission Assessment  Number of days since last admission?: 8-30 days  Previous disposition: Home with Family  Who is being interviewed?: Patient  What was the patient's/caregiver's perception as to why they think they needed to return back to the hospital?: Other (Comment) (per pt, she was short of breath)  Did you visit your Primary Care Physician after you left the hospital, before you returned this time?: Yes (per pt, she had virtual appointment because she was covid positive)  Did you see a specialist, such as Cardiac, Pulmonary, Orthopedic Physician, etc. after you left the hospital?: No  Who advised the patient to return to the hospital?: Self-referral  Does the patient report anything that got in the way of taking their medications?: No  In our efforts to provide the best possible care to you and others like you, can you think of anything that we could have done to help you after you left the hospital the first time, so that you might not have needed to return so soon?: Additional Community resources available for illness support          MARTA Gibbons RN  Care Management  Pager: [phone]

## 2022-09-12 NOTE — CONSULTS
Consult Note  Consult requested by: Dr. Carito Aguero is a 81 y.o. female BLACK/ who is being seen on consult for acute kidney injury  Chief Complaint   Patient presents with    Respiratory Distress     Admission diagnosis: Respiratory distress    32-year-old female with past medical history of hypertension, congestive heart failure, admitted for decompensated heart failure, following for renal failure  Impression & Plan:   IMPRESSION:   Acute on chronic kidney injury, rising creatinine and BUN in setting of overdiuresis  CKD stage III with proteinuria, baseline creatinine around 1.5 mg per DL, history of frequent CARA  Uremia, asymptomatic  Decompensated heart failure, diastolic heart failure, improving symptoms  Diabetes  Secondary hyperparathyroidism of CKD   PLAN:   Agree with holding diuretics, monitor urine output and uremia. Continue calcitriol. Adjust medication per current renal function status. Thank you very much for allowing me to participate in the care of this patient. We will continue to monitor with you and make recommendations as needed. HPI: 32-year-old female with past medical history of heart failure, history of COVID-pneumonia, CKD came to the hospital with short of breath and lower extremity edema. She has been admitted to floor for decompensated heart failure, evaluated by cardiology colleague, has been receiving diuretics. Over last 48 hours her creatinine and BUN has been climbing upward, nephrology service consulted for further assistant. Ms. Jorgito Hart has a known CKD with proteinuria her baseline creatinine has been around 1.3 to 1.5 mg per DL. She has history of frequent CARA from cardiorenal injury. During my visit she admit her breathing is okay and her leg swelling is improving. She denies for any change in her urine output.         Past Medical History:   Diagnosis Date    Abnormal WBC count 5/17/2016    Anemia     Bilateral shoulder pain 2016    Chondromalacia of both patellae     COPD (chronic obstructive pulmonary disease) ([facility] 2015    mild-mod dz; Dr Richard Randhawa    Diabetes [facility])     Diabetic eye exam ([facility]     Dilated cardiomyopathy ([facility]     Dyslipidemia     Gout     Heart attack ([facility] 10/18/2019    History of echocardiogram 2014    Mild LVE. EF 40%. Mild, diffuse hypk. Mild LAE. Mild MR.       Hypercholesteremia 14    Hypertension 2000    Noncompliance with medications 2016    Obesity     Orthostatic hypotension 2016    Osteoarthritis of both knees     Pain management 2016    Dr. Jerrod Montague     Popliteal cyst, bilateral      Stage 3b chronic kidney disease ([facility] 2022    Vitamin D deficiency 10/16/14      Past Surgical History:   Procedure Laterality Date    HX HEART CATHETERIZATION      HX TUBAL LIGATION         Social History     Socioeconomic History    Marital status:      Spouse name: Not on file    Number of children: 3    Years of education: Not on file    Highest education level: Not on file   Occupational History    Occupation: retired   Tobacco Use    Smoking status: Former     Packs/day: 0.25     Years: 48.00     Pack years: 12.00     Types: Cigarettes     Quit date: 2018     Years since quittin.3    Smokeless tobacco: Never   Substance and Sexual Activity    Alcohol use: No     Alcohol/week: 0.0 standard drinks    Drug use: No    Sexual activity: Yes     Partners: Male   Other Topics Concern     Service No    Blood Transfusions No    Caffeine Concern No    Occupational Exposure No    Hobby Hazards No    Sleep Concern No    Stress Concern No    Weight Concern No    Special Diet No    Back Care No    Exercise No    Bike Helmet No    Seat Belt Yes    Self-Exams Yes   Social History Narrative    Not on file     Social Determinants of Health     Financial Resource Strain: Not on file   Food Insecurity: Not on file   Transportation Needs: Not on file   Physical Activity: Not on file   Stress: Not on file   Social Connections: Not on file   Intimate Partner Violence: Not on file   Housing Stability: Not on file       Family History   Problem Relation Age of Onset    Diabetes Mother     Hypertension Mother     Hypertension Father     Kidney Disease Maternal Aunt 48        Dialysis     No Known Allergies     Home Medications:     Prior to Admission Medications   Prescriptions Last Dose Informant Patient Reported? Taking? albuterol-ipratropium (DUO-NEB) 2.5 mg-0.5 mg/3 ml nebu   No No   Sig: 3 mL by Nebulization route every six (6) hours as needed for Wheezing. aspirin 81 mg chewable tablet   No No   Sig: Take 2 Tabs by mouth daily. atorvastatin (LIPITOR) 80 mg tablet   No No   Sig: Take 1 Tab by mouth daily. calcitRIOL (ROCALTROL) 0.25 mcg capsule   No No   Sig: Take 1 Cap by mouth daily. carvediloL (COREG) 25 mg tablet   No No   Sig: Take 1 Tab by mouth two (2) times daily (with meals). doxycycline (MONODOX) 100 mg capsule   No No   Sig: Take 1 Capsule by mouth two (2) times a day for 7 days. furosemide (LASIX) 20 mg tablet   No No   Sig: Take 1 Tablet by mouth in the morning.   gabapentin (NEURONTIN) 400 mg capsule   Yes No   Sig: Take 400 mg by mouth in the morning. hydrALAZINE (APRESOLINE) 100 mg tablet   Yes No   Sig: Take 100 mg by mouth three (3) times daily. insulin detemir (LEVEMIR) 100 unit/mL injection   No No   Sig: 15 units daily for diabetes   olodateroL (STRIVERDI) 2.5 mcg/actuation mist   No No   Sig: Take 2 Puffs by inhalation in the morning. predniSONE (DELTASONE) 50 mg tablet   No No   Sig: Take 1 Tablet by mouth daily for 5 days. tiZANidine (ZANAFLEX) 4 mg tablet   Yes No   Sig: Take 4 mg by mouth two (2) times daily as needed for Muscle Spasm(s). umeclidinium-vilanterol (ANORO ELLIPTA) 62.5-25 mcg/actuation inhaler   No No   Sig: Take 1 Puff by inhalation daily.  For COPD      Facility-Administered Medications: None       Current Facility-Administered Medications   Medication Dose Route Frequency    0.9% sodium chloride infusion  75 mL/hr IntraVENous CONTINUOUS    isosorbide mononitrate ER (IMDUR) tablet 60 mg  60 mg Oral DAILY    albuterol-ipratropium (DUO-NEB) 2.5 MG-0.5 MG/3 ML  3 mL Nebulization Q6H PRN    carvediloL (COREG) tablet 25 mg  25 mg Oral BID WITH MEALS    [Held by provider] furosemide (LASIX) tablet 20 mg  20 mg Oral DAILY    hydrALAZINE (APRESOLINE) tablet 100 mg  100 mg Oral TID    amLODIPine (NORVASC) tablet 10 mg  10 mg Oral DAILY    aspirin chewable tablet 81 mg  81 mg Oral DAILY    atorvastatin (LIPITOR) tablet 40 mg  40 mg Oral QHS    gabapentin (NEURONTIN) capsule 400 mg  400 mg Oral DAILY    sodium chloride (NS) flush 5-40 mL  5-40 mL IntraVENous Q8H    sodium chloride (NS) flush 5-40 mL  5-40 mL IntraVENous PRN    acetaminophen (TYLENOL) tablet 650 mg  650 mg Oral Q6H PRN    Or    acetaminophen (TYLENOL) suppository 650 mg  650 mg Rectal Q6H PRN    polyethylene glycol (MIRALAX) packet 17 g  17 g Oral DAILY PRN    ondansetron (ZOFRAN ODT) tablet 4 mg  4 mg Oral Q8H PRN    Or    ondansetron (ZOFRAN) injection 4 mg  4 mg IntraVENous Q6H PRN    enoxaparin (LOVENOX) injection 40 mg  40 mg SubCUTAneous DAILY    insulin lispro (HUMALOG) injection   SubCUTAneous AC&HS    glucose chewable tablet 16 g  16 g Oral PRN    glucagon (GLUCAGEN) injection 1 mg  1 mg IntraMUSCular PRN    dextrose 10% infusion 0-250 mL  0-250 mL IntraVENous PRN    insulin glargine (LANTUS) injection 7 Units  7 Units SubCUTAneous DAILY    naloxone (NARCAN) injection 0.4 mg  0.4 mg IntraVENous EVERY 2 MINUTES AS NEEDED    oxyCODONE-acetaminophen (PERCOCET) 5-325 mg per tablet 1 Tablet  1 Tablet Oral Q4H PRN       Review of Systems:   Complete 10-point review of systems were obtained and discussed in length  with the patient. Complete review of systems was negative/unremarkable  except as mentioned in HPI section.   Data Review:    Labs: Results: Chemistry Recent Labs     09/12/22  0509 09/11/22  1023 09/10/22  0549   * 166* 157*    140 139   K 4.8 4.4 5.1    107 107   CO2 22 26 26   BUN 80* 71* 59*   CREA 2.46* 2.28* 2.01*   CA 8.7 8.6 9.6   AGAP 9 7 6   BUCR 33* 31* 29*      CBC w/Diff Recent Labs     09/10/22  0549   WBC 10.4   RBC 3.58*   HGB 10.1*   HCT 32.8*      GRANS 78*   LYMPH 9*   EOS 0      Coagulation No results for input(s): PTP, INR, APTT, INREXT in the last 72 hours. Iron/Ferritin No results for input(s): IRON in the last 72 hours. No lab exists for component: TIBCCALC   BNP No results for input(s): BNPP in the last 72 hours. Cardiac Enzymes No results for input(s): CPK, CKND1, GODFREY in the last 72 hours. No lab exists for component: CKRMB, TROIP   Liver Enzymes No results for input(s): TP, ALB, TBIL, AP in the last 72 hours. No lab exists for component: SGOT, GPT, DBIL   Thyroid Studies Lab Results   Component Value Date/Time    TSH 0.21 (L) 03/19/2021 04:00 AM         EKG: normal EKG, normal sinus rhythm.     Physical Assessment:   Visit Vitals  BP (!) 111/55   Pulse 86   Temp 97.9 °F (36.6 °C)   Resp 17   Ht 5' 4\" (1.626 m)   Wt 100.7 kg (222 lb)   SpO2 95%   BMI 38.11 kg/m²     Weight change: -1.361 kg (-3 lb)    Intake/Output Summary (Last 24 hours) at 9/12/2022 1352  Last data filed at 9/12/2022 0509  Gross per 24 hour   Intake 240 ml   Output 600 ml   Net -360 ml     Physical Exam:   General: comfortable, no acute distress   HEENT sclera anicteric, supple neck, no thyromegaly  CVS: S1S2 heard,  no rub  RS: + air entry b/l,   Abd: Soft, Non tender, Not distended, Positive bowel sounds, no organomegaly, no CVA / supra pubic tenderness  Neuro: non focal, awake, alert , CN II-XII are grossly intact  Extrm: + edema, no cyanosis, clubbing   Skin: no visible  Rash  Musculoskeletal: No gross joints or bone deformities     Procedures/imaging: see electronic medical records for all procedures, Xrays and details which were not copied into this note but were reviewed prior to creation of Yudith Polk MD  September 12, 2022  [facility]  Office [phone]

## 2022-09-12 NOTE — PROGRESS NOTES
Problem: Falls - Risk of  Goal: *Absence of Falls  Description: Document Floyd Villeda Fall Risk and appropriate interventions in the flowsheet. Outcome: Progressing Towards Goal  Note: Fall Risk Interventions:  Mobility Interventions: Bed/chair exit alarm, Patient to call before getting OOB, Strengthening exercises (ROM-active/passive)         Medication Interventions: Bed/chair exit alarm, Patient to call before getting OOB, Evaluate medications/consider consulting pharmacy    Elimination Interventions: Call light in reach, Bed/chair exit alarm, Toileting schedule/hourly rounds, Patient to call for help with toileting needs, Stay With Me (per policy)              Problem: Patient Education: Go to Patient Education Activity  Goal: Patient/Family Education  Outcome: Progressing Towards Goal     Problem: Pressure Injury - Risk of  Goal: *Prevention of pressure injury  Description: Document Vladislav Scale and appropriate interventions in the flowsheet.   Outcome: Progressing Towards Goal  Note: Pressure Injury Interventions:  Sensory Interventions: Assess changes in LOC, Keep linens dry and wrinkle-free, Monitor skin under medical devices, Pad between skin to skin, Pressure redistribution bed/mattress (bed type)    Moisture Interventions: Absorbent underpads, Internal/External urinary devices    Activity Interventions: Pressure redistribution bed/mattress(bed type)    Mobility Interventions: HOB 30 degrees or less    Nutrition Interventions: Document food/fluid/supplement intake, Offer support with meals,snacks and hydration                     Problem: Patient Education: Go to Patient Education Activity  Goal: Patient/Family Education  Outcome: Progressing Towards Goal

## 2022-09-13 LAB
ANION GAP SERPL CALC-SCNC: 9 MMOL/L (ref 3–18)
APPEARANCE UR: CLEAR
BACTERIA URNS QL MICRO: NEGATIVE /HPF
BILIRUB UR QL: NEGATIVE
BUN SERPL-MCNC: 78 MG/DL (ref 7–18)
BUN/CREAT SERPL: 34 (ref 12–20)
CALCIUM SERPL-MCNC: 8.5 MG/DL (ref 8.5–10.1)
CHLORIDE SERPL-SCNC: 106 MMOL/L (ref 100–111)
CO2 SERPL-SCNC: 22 MMOL/L (ref 21–32)
COLOR UR: YELLOW
CREAT SERPL-MCNC: 2.28 MG/DL (ref 0.6–1.3)
EPITH CASTS URNS QL MICRO: NORMAL /LPF (ref 0–5)
GLUCOSE BLD STRIP.AUTO-MCNC: 218 MG/DL (ref 70–110)
GLUCOSE BLD STRIP.AUTO-MCNC: 222 MG/DL (ref 70–110)
GLUCOSE BLD STRIP.AUTO-MCNC: 233 MG/DL (ref 70–110)
GLUCOSE BLD STRIP.AUTO-MCNC: 299 MG/DL (ref 70–110)
GLUCOSE SERPL-MCNC: 221 MG/DL (ref 74–99)
GLUCOSE UR STRIP.AUTO-MCNC: NEGATIVE MG/DL
HGB UR QL STRIP: NEGATIVE
KETONES UR QL STRIP.AUTO: NEGATIVE MG/DL
LEUKOCYTE ESTERASE UR QL STRIP.AUTO: NEGATIVE
NITRITE UR QL STRIP.AUTO: NEGATIVE
PH UR STRIP: 5 [PH] (ref 5–8)
POTASSIUM SERPL-SCNC: 5.4 MMOL/L (ref 3.5–5.5)
PROT UR STRIP-MCNC: NEGATIVE MG/DL
RBC #/AREA URNS HPF: NORMAL /HPF (ref 0–5)
SODIUM SERPL-SCNC: 137 MMOL/L (ref 136–145)
SP GR UR REFRACTOMETRY: 1.01 (ref 1–1.03)
UROBILINOGEN UR QL STRIP.AUTO: 0.2 EU/DL (ref 0.2–1)
WBC URNS QL MICRO: NORMAL /HPF (ref 0–4)

## 2022-09-13 PROCEDURE — 65270000046 HC RM TELEMETRY

## 2022-09-13 PROCEDURE — 36415 COLL VENOUS BLD VENIPUNCTURE: CPT

## 2022-09-13 PROCEDURE — 94761 N-INVAS EAR/PLS OXIMETRY MLT: CPT

## 2022-09-13 PROCEDURE — 97116 GAIT TRAINING THERAPY: CPT

## 2022-09-13 PROCEDURE — 82962 GLUCOSE BLOOD TEST: CPT

## 2022-09-13 PROCEDURE — 74011636637 HC RX REV CODE- 636/637: Performed by: HOSPITALIST

## 2022-09-13 PROCEDURE — 74011000250 HC RX REV CODE- 250: Performed by: INTERNAL MEDICINE

## 2022-09-13 PROCEDURE — 99232 SBSQ HOSP IP/OBS MODERATE 35: CPT | Performed by: HOSPITALIST

## 2022-09-13 PROCEDURE — 99232 SBSQ HOSP IP/OBS MODERATE 35: CPT | Performed by: INTERNAL MEDICINE

## 2022-09-13 PROCEDURE — 74011250637 HC RX REV CODE- 250/637: Performed by: INTERNAL MEDICINE

## 2022-09-13 PROCEDURE — 74011250636 HC RX REV CODE- 250/636: Performed by: INTERNAL MEDICINE

## 2022-09-13 PROCEDURE — 74011250636 HC RX REV CODE- 250/636: Performed by: HOSPITALIST

## 2022-09-13 PROCEDURE — 94660 CPAP INITIATION&MGMT: CPT

## 2022-09-13 PROCEDURE — 94640 AIRWAY INHALATION TREATMENT: CPT

## 2022-09-13 PROCEDURE — 74011250637 HC RX REV CODE- 250/637: Performed by: PHYSICIAN ASSISTANT

## 2022-09-13 PROCEDURE — 94664 DEMO&/EVAL PT USE INHALER: CPT

## 2022-09-13 PROCEDURE — 2709999900 HC NON-CHARGEABLE SUPPLY

## 2022-09-13 PROCEDURE — 77030038269 HC DRN EXT URIN PURWCK BARD -A

## 2022-09-13 PROCEDURE — 97161 PT EVAL LOW COMPLEX 20 MIN: CPT

## 2022-09-13 PROCEDURE — 80048 BASIC METABOLIC PNL TOTAL CA: CPT

## 2022-09-13 PROCEDURE — 77010033678 HC OXYGEN DAILY

## 2022-09-13 PROCEDURE — 74011636637 HC RX REV CODE- 636/637: Performed by: INTERNAL MEDICINE

## 2022-09-13 PROCEDURE — 81001 URINALYSIS AUTO W/SCOPE: CPT

## 2022-09-13 RX ORDER — BUDESONIDE 0.5 MG/2ML
500 INHALANT ORAL
Status: DISCONTINUED | OUTPATIENT
Start: 2022-09-13 | End: 2022-09-22 | Stop reason: HOSPADM

## 2022-09-13 RX ORDER — INSULIN GLARGINE 100 [IU]/ML
12 INJECTION, SOLUTION SUBCUTANEOUS DAILY
Status: DISCONTINUED | OUTPATIENT
Start: 2022-09-14 | End: 2022-09-13

## 2022-09-13 RX ORDER — INSULIN GLARGINE 100 [IU]/ML
12 INJECTION, SOLUTION SUBCUTANEOUS
Status: DISCONTINUED | OUTPATIENT
Start: 2022-09-13 | End: 2022-09-13

## 2022-09-13 RX ORDER — FUROSEMIDE 40 MG/1
40 TABLET ORAL DAILY
Status: DISCONTINUED | OUTPATIENT
Start: 2022-09-14 | End: 2022-09-22

## 2022-09-13 RX ORDER — INSULIN LISPRO 100 [IU]/ML
4 INJECTION, SOLUTION INTRAVENOUS; SUBCUTANEOUS
Status: DISCONTINUED | OUTPATIENT
Start: 2022-09-13 | End: 2022-09-16 | Stop reason: DRUGHIGH

## 2022-09-13 RX ORDER — INSULIN GLARGINE 100 [IU]/ML
12 INJECTION, SOLUTION SUBCUTANEOUS
Status: DISCONTINUED | OUTPATIENT
Start: 2022-09-13 | End: 2022-09-14

## 2022-09-13 RX ORDER — CLONIDINE HYDROCHLORIDE 0.1 MG/1
0.1 TABLET ORAL 2 TIMES DAILY
Status: DISCONTINUED | OUTPATIENT
Start: 2022-09-13 | End: 2022-09-22 | Stop reason: HOSPADM

## 2022-09-13 RX ORDER — ARFORMOTEROL TARTRATE 15 UG/2ML
15 SOLUTION RESPIRATORY (INHALATION)
Status: DISCONTINUED | OUTPATIENT
Start: 2022-09-13 | End: 2022-09-22 | Stop reason: HOSPADM

## 2022-09-13 RX ORDER — FUROSEMIDE 10 MG/ML
40 INJECTION INTRAMUSCULAR; INTRAVENOUS ONCE
Status: COMPLETED | OUTPATIENT
Start: 2022-09-13 | End: 2022-09-13

## 2022-09-13 RX ORDER — INSULIN GLARGINE 100 [IU]/ML
5 INJECTION, SOLUTION SUBCUTANEOUS
Status: COMPLETED | OUTPATIENT
Start: 2022-09-13 | End: 2022-09-13

## 2022-09-13 RX ADMIN — CLONIDINE HYDROCHLORIDE 0.1 MG: 0.1 TABLET ORAL at 17:31

## 2022-09-13 RX ADMIN — CARVEDILOL 25 MG: 25 TABLET, FILM COATED ORAL at 09:07

## 2022-09-13 RX ADMIN — METHYLPREDNISOLONE SODIUM SUCCINATE 80 MG: 125 INJECTION, POWDER, FOR SOLUTION INTRAMUSCULAR; INTRAVENOUS at 09:20

## 2022-09-13 RX ADMIN — Medication 6 UNITS: at 21:35

## 2022-09-13 RX ADMIN — Medication 6 UNITS: at 17:30

## 2022-09-13 RX ADMIN — OXYCODONE HYDROCHLORIDE AND ACETAMINOPHEN 1 TABLET: 5; 325 TABLET ORAL at 04:18

## 2022-09-13 RX ADMIN — HYDRALAZINE HYDROCHLORIDE 100 MG: 50 TABLET, FILM COATED ORAL at 09:07

## 2022-09-13 RX ADMIN — AMLODIPINE BESYLATE 10 MG: 10 TABLET ORAL at 09:05

## 2022-09-13 RX ADMIN — FUROSEMIDE 40 MG: 10 INJECTION, SOLUTION INTRAMUSCULAR; INTRAVENOUS at 09:43

## 2022-09-13 RX ADMIN — OXYCODONE HYDROCHLORIDE AND ACETAMINOPHEN 1 TABLET: 5; 325 TABLET ORAL at 22:08

## 2022-09-13 RX ADMIN — Medication 6 UNITS: at 07:30

## 2022-09-13 RX ADMIN — CLONIDINE HYDROCHLORIDE 0.1 MG: 0.1 TABLET ORAL at 11:45

## 2022-09-13 RX ADMIN — HYDRALAZINE HYDROCHLORIDE 100 MG: 50 TABLET, FILM COATED ORAL at 21:37

## 2022-09-13 RX ADMIN — CARVEDILOL 25 MG: 25 TABLET, FILM COATED ORAL at 17:32

## 2022-09-13 RX ADMIN — ARFORMOTEROL TARTRATE 15 MCG: 15 SOLUTION RESPIRATORY (INHALATION) at 21:42

## 2022-09-13 RX ADMIN — ISOSORBIDE MONONITRATE 60 MG: 60 TABLET, EXTENDED RELEASE ORAL at 09:08

## 2022-09-13 RX ADMIN — HYDRALAZINE HYDROCHLORIDE 100 MG: 50 TABLET, FILM COATED ORAL at 16:32

## 2022-09-13 RX ADMIN — Medication 7 UNITS: at 09:00

## 2022-09-13 RX ADMIN — SODIUM CHLORIDE, PRESERVATIVE FREE 10 ML: 5 INJECTION INTRAVENOUS at 06:40

## 2022-09-13 RX ADMIN — OXYCODONE HYDROCHLORIDE AND ACETAMINOPHEN 1 TABLET: 5; 325 TABLET ORAL at 17:37

## 2022-09-13 RX ADMIN — Medication 12 UNITS: at 17:37

## 2022-09-13 RX ADMIN — Medication 9 UNITS: at 12:00

## 2022-09-13 RX ADMIN — Medication 4 UNITS: at 17:36

## 2022-09-13 RX ADMIN — SODIUM CHLORIDE, PRESERVATIVE FREE 5 ML: 5 INJECTION INTRAVENOUS at 14:33

## 2022-09-13 RX ADMIN — BUDESONIDE 500 MCG: 0.5 SUSPENSION RESPIRATORY (INHALATION) at 21:42

## 2022-09-13 RX ADMIN — Medication 5 UNITS: at 13:07

## 2022-09-13 RX ADMIN — ASPIRIN 81 MG CHEWABLE TABLET 81 MG: 81 TABLET CHEWABLE at 09:08

## 2022-09-13 RX ADMIN — GABAPENTIN 400 MG: 400 CAPSULE ORAL at 09:06

## 2022-09-13 RX ADMIN — METHYLPREDNISOLONE SODIUM SUCCINATE 40 MG: 40 INJECTION, POWDER, FOR SOLUTION INTRAMUSCULAR; INTRAVENOUS at 21:37

## 2022-09-13 RX ADMIN — OXYCODONE HYDROCHLORIDE AND ACETAMINOPHEN 1 TABLET: 5; 325 TABLET ORAL at 09:41

## 2022-09-13 RX ADMIN — ATORVASTATIN CALCIUM 40 MG: 40 TABLET, FILM COATED ORAL at 21:37

## 2022-09-13 NOTE — PROGRESS NOTES
Cardiology Progress Note    Admit Date: 9/9/2022  Attending Cardiologist: Dr. Richards Shown      Assessment:       -Acute hypoxic respiratory failure in setting of below, s/p bipap. Improved. -Flash pulmonary edema in setting of severely elevated blood pressures.   -Chest pain, atypical with pleuritic component. MI r/o with serial negative cardiac enzymes. No acute ischemic changes on ECG. -Hypertensive crisis, Max /121 mmHg. Improved. -a/c HFpEF. Last Echo in June 2022 with normal LVEF.   -CAD, s/p cardiac cath 10/2019 with findings as follows:  LM: Angiographically normal  LAD: Previously placed ostial to proximal LAD stent is widely patent. D1 with ostial 50% stenosis. -CKD  -Anemia  -HTN  -DM  -Hypercholesterolemia, on statin  -COPD        Primary cardiologist is Dr. Ashkan Eduardo:     -Continue po blood pressure regimen. No acei/arb d/t renal function. Clonidine added for further BP optimization.   -Continue ASA and statin for CAD. See my separate note today for recommendations. Subjective:     Remains SOB with activity.  Stable at rest.     Objective:      Patient Vitals for the past 8 hrs:   Temp Pulse Resp BP SpO2   09/13/22 0904 -- 99 -- (!) 155/94 --   09/13/22 0709 98.2 °F (36.8 °C) 94 18 (!) 121/56 98 %   09/13/22 0400 98 °F (36.7 °C) 91 19 (!) 141/70 97 %           Patient Vitals for the past 96 hrs:   Weight   09/12/22 0509 100.7 kg (222 lb)   09/11/22 0312 102.1 kg (225 lb)   09/10/22 0301 99.8 kg (220 lb)         TELE: SR               Current Facility-Administered Medications   Medication Dose Route Frequency Last Admin    [START ON 9/14/2022] furosemide (LASIX) tablet 40 mg  40 mg Oral DAILY      cloNIDine HCL (CATAPRES) tablet 0.1 mg  0.1 mg Oral BID      methylPREDNISolone (PF) (SOLU-MEDROL) injection 80 mg  80 mg IntraVENous Q12H 80 mg at 09/13/22 0920    albuterol-ipratropium (DUO-NEB) 2.5 MG-0.5 MG/3 ML  3 mL Nebulization Q4H PRN      isosorbide mononitrate ER (IMDUR) tablet 60 mg  60 mg Oral DAILY 60 mg at 09/13/22 0908    carvediloL (COREG) tablet 25 mg  25 mg Oral BID WITH MEALS 25 mg at 09/13/22 1094    hydrALAZINE (APRESOLINE) tablet 100 mg  100 mg Oral  mg at 09/13/22 0907    amLODIPine (NORVASC) tablet 10 mg  10 mg Oral DAILY 10 mg at 09/13/22 3688    aspirin chewable tablet 81 mg  81 mg Oral DAILY 81 mg at 09/13/22 0908    atorvastatin (LIPITOR) tablet 40 mg  40 mg Oral QHS 40 mg at 09/12/22 2239    gabapentin (NEURONTIN) capsule 400 mg  400 mg Oral DAILY 400 mg at 09/13/22 0906    sodium chloride (NS) flush 5-40 mL  5-40 mL IntraVENous Q8H 10 mL at 09/13/22 0640    sodium chloride (NS) flush 5-40 mL  5-40 mL IntraVENous PRN      acetaminophen (TYLENOL) tablet 650 mg  650 mg Oral Q6H  mg at 09/09/22 2002    Or    acetaminophen (TYLENOL) suppository 650 mg  650 mg Rectal Q6H PRN      polyethylene glycol (MIRALAX) packet 17 g  17 g Oral DAILY PRN      ondansetron (ZOFRAN ODT) tablet 4 mg  4 mg Oral Q8H PRN      Or    ondansetron (ZOFRAN) injection 4 mg  4 mg IntraVENous Q6H PRN      enoxaparin (LOVENOX) injection 40 mg  40 mg SubCUTAneous DAILY 40 mg at 09/10/22 0912    insulin lispro (HUMALOG) injection   SubCUTAneous AC&HS 6 Units at 09/13/22 0730    glucose chewable tablet 16 g  16 g Oral PRN      glucagon (GLUCAGEN) injection 1 mg  1 mg IntraMUSCular PRN      dextrose 10% infusion 0-250 mL  0-250 mL IntraVENous PRN      insulin glargine (LANTUS) injection 7 Units  7 Units SubCUTAneous DAILY 7 Units at 09/13/22 0900    naloxone (NARCAN) injection 0.4 mg  0.4 mg IntraVENous EVERY 2 MINUTES AS NEEDED      oxyCODONE-acetaminophen (PERCOCET) 5-325 mg per tablet 1 Tablet  1 Tablet Oral Q4H PRN 1 Tablet at 09/13/22 0941         Intake/Output Summary (Last 24 hours) at 9/13/2022 0950  Last data filed at 9/12/2022 1649  Gross per 24 hour   Intake 721.25 ml   Output --   Net 721.25 ml         Physical Exam:  General:  alert, cooperative, no distress, appears stated age  Neck:  no JVD  Lungs:  few bibasilar rales, expiratory wheezes. Heart:  regular rate and rhythm, S1, S2 normal, no murmur, click, rub or gallop  Abdomen:  abdomen is soft without significant tenderness, masses, organomegaly or guarding  Extremities:  extremities normal, atraumatic, no cyanosis or edema    Visit Vitals  BP (!) 155/94   Pulse 99   Temp 98.2 °F (36.8 °C)   Resp 18   Ht 5' 4\" (1.626 m)   Wt 100.7 kg (222 lb)   SpO2 98%   BMI 38.11 kg/m²       Data Review:     Labs: Results:       Chemistry Recent Labs     09/13/22  0342 09/12/22  0509 09/11/22  1023   * 161* 166*    138 140   K 5.4 4.8 4.4    107 107   CO2 22 22 26   BUN 78* 80* 71*   CREA 2.28* 2.46* 2.28*   CA 8.5 8.7 8.6   MG  --  1.7  --    PHOS  --  4.6  --    AGAP 9 9 7   BUCR 34* 33* 31*        CBC w/Diff No results for input(s): WBC, RBC, HGB, HCT, PLT, GRANS, LYMPH, EOS, HGBEXT, HCTEXT, PLTEXT, HGBEXT, HCTEXT, PLTEXT in the last 72 hours. Cardiac Enzymes No results found for: CPK, CK, CKMMB, CKMB, RCK3, CKMBT, CKNDX, CKND1, GODFREY, TROPT, TROIQ, SANG, TROPT, TNIPOC, BNP, BNPP   Coagulation No results for input(s): PTP, INR, APTT, INREXT, INREXT in the last 72 hours. Lipid Panel Lab Results   Component Value Date/Time    Cholesterol, total 152 10/10/2019 12:00 AM    HDL Cholesterol 46 10/10/2019 12:00 AM    LDL, calculated 86 10/10/2019 12:00 AM    VLDL, calculated 20 10/10/2019 12:00 AM    Triglyceride 101 10/10/2019 12:00 AM    CHOL/HDL Ratio 5.6 (H) 05/21/2018 06:23 AM      BNP No results found for: BNP, BNPP, XBNPT   Liver Enzymes No results for input(s): TP, ALB, TBIL, AP in the last 72 hours. No lab exists for component: SGOT, GPT, DBIL     Thyroid Studies Lab Results   Component Value Date/Time    TSH 0.21 (L) 03/19/2021 04:00 AM          Signed By: Toor Whitman PA-C     September 13, 2022       I have personally and independently evaluated and examined the patient. All relevant labs and testing data are reviewed.  I have personally reviewed all the diagnostic labs, available EKG imaging x-rays and other diagnostic data and incorporated them into my care. I am referencing APC's note. I participated in medical decision making. Care plan discussed and updated after review. More than 50% time spent reviewing data, examining patient and recommending assessment and plan.   Vaughn Alejandra MD

## 2022-09-13 NOTE — PROGRESS NOTES
Progress Note  Pulmonary, Critical Care, and Sleep Medicine    Name: Oswaldo Rodriguez MRN: [id number]   : 1950 Hospital: [address] [place] [address]    Date: 2022        IMPRESSION:   Wheezing in a former smoker possibly bronchospasm/undiagnosed COPD vs \"cardiac asthma\" in the setting of chronic diastolic heart failure, favor the latter Echo from  with EF 45% and mild to moderate mitral regurgitation  Acute hypoxic respiratory failure related to flash pulmonary edema. 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 Previously followed by Dr. Clarence Srivastava  DM   Acute on chronic kidney disease  Ground glass opacities on CT from August probably due to COVID vs heart failure  History of COPD although PFT in  consistent with restrictive lung disease  History of STEMI in 2018 requiring intubation and mechanical ventilation  Former smoker 46 PY, quit in 2019  Chronic pain syndrome  Obesity Body mass index is 38.11 kg/m². Recent COVID positive       PLAN:   Continue bronchodilators  BIPAP HS and prn  Needs outpatient sleep study/titration, will defer to Dr. Ameena Toth ordered as unable to R/o COPD  Solumedrol and taper as improvement noted. Outpatient PFT's and 6 minute walk  Prophylaxis issues per primary team     Subjective/Interval History:   I have reviewed the flowsheet and previous days notes. Reviewed interval history. Discussed management with nursing staff.    22  Notes improvement but still episodic SOB. BIPAP d/gael for unknown reasons, will resume  No chest pain      ROS:Pertinent items are noted in HPI. Orders reviewed including medications. Changes made if indicated. Telemetry monitor reviewed at the bedside.   Objective:   Vital Signs:    Visit Vitals  /62   Pulse 89   Temp 98.8 °F (37.1 °C)   Resp 15   Ht 5' 4\" (1.626 m)   Wt 104 kg (229 lb 4.8 oz)   SpO2 97%   BMI 39.36 kg/m²       O2 Device: None (Room air)   O2 Flow Rate (L/min): 2 l/min   Temp (24hrs), Av.4 °F (36.9 °C), Min:97.9 °F (36.6 °C), Max:98.8 °F (37.1 °C)       Intake/Output:   Last shift:      701 - 1900  In: 480 [P.O.:480]  Out: -   Last 3 shifts: 1901 -  0700  In: 1201.3 [P.O.:720; I.V.:481.3]  Out: 600 [Urine:600]    Intake/Output Summary (Last 24 hours) at 2022 1504  Last data filed at 2022 1358  Gross per 24 hour   Intake 961.25 ml   Output --   Net 961.25 ml        Physical Exam:    General:  Alert, cooperative, in no distress, appears stated age. Head:  Normocephalic, without obvious abnormality, atraumatic. Eyes:  ANicteric, PERRL,   Nose: Nares normal. Mucosa normal. No drainage or sinus tenderness. Throat: Lips, mucosa, and tongue normal.     Neck: Supple, symmetrical, trachea midline, no adenopathy, thyroid: no enlargment/tenderness/nodules, no carotid bruit and no JVD    Back:   Symmetric    Lungs:   Bilateral auscultation distant breath sounds, no rales or wheezes   Chest wall:  No tenderness or deformity. NO intercostal retractions   Heart:  Regular rate and rhythm, S1, S2 normal, no murmur, click, rub or gallop. Abdomen:   Soft, non-tender. Bowel sounds normal. No masses,  No organomegaly. NO paradoxical motion   Extremities: normal, atraumatic, no cyanosis, trace edema   Pulses: 2+ and symmetric all extremities. Skin: Skin color, texture, turgor normal. No rashes or lesions. NO clubbing   Lymph nodes: Cervical, supraclavicular nodes normal.   Neurologic: Grossly nonfocal      :        Devices:             Drips:    DATA:  Labs:  No results for input(s): WBC, HGB, HCT, PLT, HGBEXT, HCTEXT, PLTEXT in the last 72 hours.   Recent Labs     22  0342 22  0509 22  1023    138 140   K 5.4 4.8 4.4    107 107   CO2 22 22 26   * 161* 166*   BUN 78* 80* 71*   CREA 2.28* 2.46* 2.28*   CA 8.5 8.7 8.6   MG  --  1.7  --    PHOS  --  4.6  --      No results for input(s): PH, PCO2, PO2, HCO3, FIO2 in the last 72 hours.    Imaging:  []        I have personally reviewed the patients radiographs and reports  []         []        No change from prior, tubes and lines in adequate position  []        Improved   []        Worsening  High complexity decision making was performed during this consultation and evaluation.  Critical care time exclusive of procedures spent managing the patient:      minutes    Ulysses Butcher MD

## 2022-09-13 NOTE — ROUTINE PROCESS
2000 Bedside and Verbal shift change report given to Gabe Lenz (oncoming nurse). Report included the following information SBAR, Kardex, MAR, Recent Results, and Cardiac Rhythm NSR .

## 2022-09-13 NOTE — DIABETES MGMT
Diabetes/ Glycemic Control Plan of Care    Recommendations: advance lantus to 12 units daily - order obtained    Assessment:   Elevated BG with addition yesterday of steroids every 12 hours   Will advance basal insulin and continue to monitor    IV Fluids containing dextrose: none  Steroids:   Rx Glucocorticoids (24h ago, onward)       Start     Dose Route Frequency Ordered Stop    09/12/22 1500  methylPREDNISolone (PF) (SOLU-MEDROL) injection 80 mg         80 mg IV EVERY 12 HOURS 09/12/22 1410 --             Blood glucose values: Within target range (70-180mg/dL):      Current insulin orders:  lantus 12 units daily  Corrective humalog, very insulin resistant, 4 times daily  Total Daily Dose previous 24 hours = 28 units  7 units lantus  21 units humalog    Current A1c:   Lab Results   Component Value Date/Time    Hemoglobin A1c 7.2 (H) 06/29/2022 03:12 AM      equivalent  to ave Blood Glucose of 160 mg/dl for 2-3 months prior to admission  Adequate glycemic control PTA: no    Nutrition/Diet:   Active Orders   Diet    ADULT DIET Regular; 3 carb choices (45 gm/meal); Low Sodium (2 gm); No Concentrated Sweets      Home diabetes medications:    insulin detemir (LEVEMIR) 100 unit/mL injection 15 units daily for diabetes     Plan/Goals:   Blood glucose will be within target of 70 - 180 mg/dl within 72 hours  Reinforce dietary and medication compliance at home.           Education:  [] Refer to Diabetes Education Record                       [x] Education not indicated at this time     Minerva Roberts MPH RN [address] [facility] [phone]

## 2022-09-13 NOTE — PROGRESS NOTES
Progress Note      72-year-old female with past medical history of hypertension, congestive heart failure, admitted for decompensated heart failure, following for renal failure  Subjective      Received IV hydration yesterday   Her diuretics has been resumed by cardiology jolanta. IMPRESSION:   Acute on chronic kidney injury, rising creatinine and BUN in setting of overdiuresis  CKD stage III with proteinuria, baseline creatinine around 1.5 mg per DL, history of frequent CARA  Uremia, asymptomatic  Decompensated heart failure, diastolic heart failure, improving symptoms  Diabetes  Secondary hyperparathyroidism of CKD   PLAN:   Continue diuretics per cardiology colleague, monitor urine output and renal function   Adjust medication per current renal function status.        Facility-Administered Medications: None       Current Facility-Administered Medications   Medication Dose Route Frequency    [START ON 9/14/2022] furosemide (LASIX) tablet 40 mg  40 mg Oral DAILY    cloNIDine HCL (CATAPRES) tablet 0.1 mg  0.1 mg Oral BID    [START ON 9/14/2022] insulin glargine (LANTUS) injection 12 Units  12 Units SubCUTAneous DAILY    insulin glargine (LANTUS) injection 5 Units  5 Units SubCUTAneous NOW    methylPREDNISolone (PF) (SOLU-MEDROL) injection 80 mg  80 mg IntraVENous Q12H    albuterol-ipratropium (DUO-NEB) 2.5 MG-0.5 MG/3 ML  3 mL Nebulization Q4H PRN    isosorbide mononitrate ER (IMDUR) tablet 60 mg  60 mg Oral DAILY    carvediloL (COREG) tablet 25 mg  25 mg Oral BID WITH MEALS    hydrALAZINE (APRESOLINE) tablet 100 mg  100 mg Oral TID    amLODIPine (NORVASC) tablet 10 mg  10 mg Oral DAILY    aspirin chewable tablet 81 mg  81 mg Oral DAILY    atorvastatin (LIPITOR) tablet 40 mg  40 mg Oral QHS    gabapentin (NEURONTIN) capsule 400 mg  400 mg Oral DAILY    sodium chloride (NS) flush 5-40 mL  5-40 mL IntraVENous Q8H    sodium chloride (NS) flush 5-40 mL  5-40 mL IntraVENous PRN    acetaminophen (TYLENOL) tablet 650 mg  650 mg Oral Q6H PRN    Or    acetaminophen (TYLENOL) suppository 650 mg  650 mg Rectal Q6H PRN    polyethylene glycol (MIRALAX) packet 17 g  17 g Oral DAILY PRN    ondansetron (ZOFRAN ODT) tablet 4 mg  4 mg Oral Q8H PRN    Or    ondansetron (ZOFRAN) injection 4 mg  4 mg IntraVENous Q6H PRN    enoxaparin (LOVENOX) injection 40 mg  40 mg SubCUTAneous DAILY    insulin lispro (HUMALOG) injection   SubCUTAneous AC&HS    glucose chewable tablet 16 g  16 g Oral PRN    glucagon (GLUCAGEN) injection 1 mg  1 mg IntraMUSCular PRN    dextrose 10% infusion 0-250 mL  0-250 mL IntraVENous PRN    naloxone (NARCAN) injection 0.4 mg  0.4 mg IntraVENous EVERY 2 MINUTES AS NEEDED    oxyCODONE-acetaminophen (PERCOCET) 5-325 mg per tablet 1 Tablet  1 Tablet Oral Q4H PRN       Review of Systems:   As above . Data Review:    Labs: Results:       Chemistry Recent Labs     09/13/22  0342 09/12/22  0509 09/11/22  1023   * 161* 166*    138 140   K 5.4 4.8 4.4    107 107   CO2 22 22 26   BUN 78* 80* 71*   CREA 2.28* 2.46* 2.28*   CA 8.5 8.7 8.6   AGAP 9 9 7   BUCR 34* 33* 31*      CBC w/Diff No results for input(s): WBC, RBC, HGB, HCT, PLT, GRANS, LYMPH, EOS, HGBEXT, HCTEXT, PLTEXT, HGBEXT, HCTEXT, PLTEXT in the last 72 hours. Coagulation No results for input(s): PTP, INR, APTT, INREXT, INREXT in the last 72 hours. Iron/Ferritin No results for input(s): IRON in the last 72 hours. No lab exists for component: TIBCCALC   BNP No results for input(s): BNPP in the last 72 hours. Cardiac Enzymes No results for input(s): CPK, CKND1, GODFREY in the last 72 hours. No lab exists for component: CKRMB, TROIP   Liver Enzymes No results for input(s): TP, ALB, TBIL, AP in the last 72 hours. No lab exists for component: SGOT, GPT, DBIL   Thyroid Studies Lab Results   Component Value Date/Time    TSH 0.21 (L) 03/19/2021 04:00 AM         EKG: normal EKG, normal sinus rhythm.     Physical Assessment:   Visit Vitals  BP 129/62   Pulse 89   Temp 98.8 °F (37.1 °C)   Resp 15   Ht 5' 4\" (1.626 m)   Wt 104 kg (229 lb 4.8 oz)   SpO2 97%   BMI 39.36 kg/m²     Weight change:     Intake/Output Summary (Last 24 hours) at 9/13/2022 1211  Last data filed at 9/13/2022 1012  Gross per 24 hour   Intake 961.25 ml   Output --   Net 961.25 ml     Physical Exam:   General: comfortable, no acute distress   HEENT sclera anicteric, supple neck, no thyromegaly  CVS: S1S2 heard,  no rub  RS: + air entry b/l,   Abd: Soft, Non tender, Not distended, Positive bowel sounds, no organomegaly, no CVA / supra pubic tenderness  Neuro: non focal, awake, alert , CN II-XII are grossly intact  Extrm: + edema, no cyanosis, clubbing   Skin: no visible  Rash  Musculoskeletal: No gross joints or bone deformities     Procedures/imaging: see electronic medical records for all procedures, Xrays and details which were not copied into this note but were reviewed prior to creation of Jake Pastrana MD  September 13, 2022  [facility]  Office [phone]

## 2022-09-13 NOTE — PROGRESS NOTES
Cardiology Progress Note    Admit Date: 9/9/2022  Attending Cardiologist: Dr. Maile Knott:       -Acute hypoxic respiratory failure in setting of below, s/p bipap. Improved. -Flash pulmonary edema in setting of severely elevated blood pressures.   -Chest pain, atypical with pleuritic component. MI r/o with serial negative cardiac enzymes. No acute ischemic changes on ECG. -Hypertensive crisis, Max /121 mmHg. Improved. -a/c HFpEF. Last Echo in June 2022 with normal LVEF.   -CAD, s/p cardiac cath 10/2019 with findings as follows:  LM: Angiographically normal  LAD: Previously placed ostial to proximal LAD stent is widely patent. D1 with ostial 50% stenosis. -CKD  -Anemia  -HTN  -DM  -Hypercholesterolemia, on statin  -COPD        Primary cardiologist is Dr. Nataliya Coello:       Blood pressure is elevating again. IV fluids were discontinued yesterday due to shortness of breath. Will restart diuretics. Use 1 dose of IV Lasix today and then maintenance oral dose from tomorrow. Watch BP closely. Add clonidine. Continue CAD treatment with aspirin, nitrates and statins as well as beta-blockers    Will follow-up      Subjective:     C/o shortness of breath on lying down.   No chest pain    Objective:      Patient Vitals for the past 8 hrs:   Temp Pulse Resp BP SpO2   09/13/22 0904 -- 99 -- (!) 155/94 --   09/13/22 0709 98.2 °F (36.8 °C) 94 18 (!) 121/56 98 %   09/13/22 0400 98 °F (36.7 °C) 91 19 (!) 141/70 97 %           Patient Vitals for the past 96 hrs:   Weight   09/13/22 1000 104 kg (229 lb 4.8 oz)   09/12/22 0509 100.7 kg (222 lb)   09/11/22 0312 102.1 kg (225 lb)   09/10/22 0301 99.8 kg (220 lb)         TELE: SR               Current Facility-Administered Medications   Medication Dose Route Frequency Last Admin    [START ON 9/14/2022] furosemide (LASIX) tablet 40 mg  40 mg Oral DAILY      cloNIDine HCL (CATAPRES) tablet 0.1 mg  0.1 mg Oral BID      [START ON 9/14/2022] insulin glargine (LANTUS) injection 12 Units  12 Units SubCUTAneous DAILY      insulin glargine (LANTUS) injection 5 Units  5 Units SubCUTAneous NOW      methylPREDNISolone (PF) (SOLU-MEDROL) injection 80 mg  80 mg IntraVENous Q12H 80 mg at 09/13/22 0920    albuterol-ipratropium (DUO-NEB) 2.5 MG-0.5 MG/3 ML  3 mL Nebulization Q4H PRN      isosorbide mononitrate ER (IMDUR) tablet 60 mg  60 mg Oral DAILY 60 mg at 09/13/22 0908    carvediloL (COREG) tablet 25 mg  25 mg Oral BID WITH MEALS 25 mg at 09/13/22 2525    hydrALAZINE (APRESOLINE) tablet 100 mg  100 mg Oral  mg at 09/13/22 0907    amLODIPine (NORVASC) tablet 10 mg  10 mg Oral DAILY 10 mg at 09/13/22 3660    aspirin chewable tablet 81 mg  81 mg Oral DAILY 81 mg at 09/13/22 0908    atorvastatin (LIPITOR) tablet 40 mg  40 mg Oral QHS 40 mg at 09/12/22 2239    gabapentin (NEURONTIN) capsule 400 mg  400 mg Oral DAILY 400 mg at 09/13/22 0906    sodium chloride (NS) flush 5-40 mL  5-40 mL IntraVENous Q8H 10 mL at 09/13/22 0640    sodium chloride (NS) flush 5-40 mL  5-40 mL IntraVENous PRN      acetaminophen (TYLENOL) tablet 650 mg  650 mg Oral Q6H  mg at 09/09/22 2002    Or    acetaminophen (TYLENOL) suppository 650 mg  650 mg Rectal Q6H PRN      polyethylene glycol (MIRALAX) packet 17 g  17 g Oral DAILY PRN      ondansetron (ZOFRAN ODT) tablet 4 mg  4 mg Oral Q8H PRN      Or    ondansetron (ZOFRAN) injection 4 mg  4 mg IntraVENous Q6H PRN      enoxaparin (LOVENOX) injection 40 mg  40 mg SubCUTAneous DAILY 40 mg at 09/10/22 0912    insulin lispro (HUMALOG) injection   SubCUTAneous AC&HS 6 Units at 09/13/22 0730    glucose chewable tablet 16 g  16 g Oral PRN      glucagon (GLUCAGEN) injection 1 mg  1 mg IntraMUSCular PRN      dextrose 10% infusion 0-250 mL  0-250 mL IntraVENous PRN      naloxone (NARCAN) injection 0.4 mg  0.4 mg IntraVENous EVERY 2 MINUTES AS NEEDED      oxyCODONE-acetaminophen (PERCOCET) 5-325 mg per tablet 1 Tablet  1 Tablet Oral Q4H PRN 1 Tablet at 09/13/22 0941         Intake/Output Summary (Last 24 hours) at 9/13/2022 1022  Last data filed at 9/13/2022 1012  Gross per 24 hour   Intake 961.25 ml   Output --   Net 961.25 ml         Physical Exam:  General:  alert, cooperative, no distress, appears stated age  Neck:  no JVD  Lungs:  few bibasilar rales   Heart:  regular rate and rhythm, S1, S2 normal, no murmur, click, rub or gallop  Abdomen:  abdomen is soft without significant tenderness, masses, organomegaly or guarding  Extremities:  extremities normal, atraumatic, no cyanosis or edema    Visit Vitals  BP (!) 155/94   Pulse 99   Temp 98.2 °F (36.8 °C)   Resp 18   Ht 5' 4\" (1.626 m)   Wt 104 kg (229 lb 4.8 oz)   SpO2 98%   BMI 39.36 kg/m²       Data Review:     Labs: Results:       Chemistry Recent Labs     09/13/22  0342 09/12/22  0509 09/11/22  1023   * 161* 166*    138 140   K 5.4 4.8 4.4    107 107   CO2 22 22 26   BUN 78* 80* 71*   CREA 2.28* 2.46* 2.28*   CA 8.5 8.7 8.6   MG  --  1.7  --    PHOS  --  4.6  --    AGAP 9 9 7   BUCR 34* 33* 31*        CBC w/Diff No results for input(s): WBC, RBC, HGB, HCT, PLT, GRANS, LYMPH, EOS, HGBEXT, HCTEXT, PLTEXT, HGBEXT, HCTEXT, PLTEXT in the last 72 hours. Cardiac Enzymes No results found for: CPK, CK, CKMMB, CKMB, RCK3, CKMBT, CKNDX, CKND1, GODFREY, TROPT, TROIQ, SANG, TROPT, TNIPOC, BNP, BNPP   Coagulation No results for input(s): PTP, INR, APTT, INREXT, INREXT in the last 72 hours. Lipid Panel Lab Results   Component Value Date/Time    Cholesterol, total 152 10/10/2019 12:00 AM    HDL Cholesterol 46 10/10/2019 12:00 AM    LDL, calculated 86 10/10/2019 12:00 AM    VLDL, calculated 20 10/10/2019 12:00 AM    Triglyceride 101 10/10/2019 12:00 AM    CHOL/HDL Ratio 5.6 (H) 05/21/2018 06:23 AM      BNP No results found for: BNP, BNPP, XBNPT   Liver Enzymes No results for input(s): TP, ALB, TBIL, AP in the last 72 hours.     No lab exists for component: SGOT, GPT, DBIL     Thyroid Studies Lab Results   Component Value Date/Time    TSH 0.21 (L) 03/19/2021 04:00 AM          Signed By: Charlette Hargrove MD     September 13, 2022

## 2022-09-13 NOTE — PROGRESS NOTES
[facility] Hospitalist Group  Progress Note    Patient: Fariha Allen Age: 81 y.o. : 1950 MR#: [id number] SSN: [id number]  Date/Time: 2022     Subjective:     68-year-old female with a history of chronic diastolic CHF, COPD presents to the hospital secondary to shortness of breath. Patient noted to have elevated blood pressure in the emergency room, started on nitro gtt. Patient admitted to stepdown unit for further evaluation. Clonidine added per cardiology. Lasix 40 mg iv x1 given this am. Creatinine slight improvement to 2.28. I/o not accurately recorded, pure wick has been reapplied today. Patient seen and examined at bedside, she reports since she came in a centrally located back pain which has been constant since her arrival.  She also reports suprapubic pain. Denies sensation of incomplete void. States that she is breathing better today, but still has orthopnea. Assessment/Plan:     Hypertensive emergency-blood pressure improving, off nitro gtt. , continue hydralazine, Coreg, amlodipine, catapres. Avoid ACE ARB given CARA. Diuretic resumed. Acute on chronic diastolic CHF exacerbation. Diuretics held. Check BMP tomorrow morning. DM2 with steroid-induced hyperglycemia. Increase Lantus. Premeal insulin. SSI. Diabetes management follows. Mild to moderate COPD with ? exacerbation. Reduce steroids, will stop tomorrow if no wheezing. Bronchodilators. RT for bronchial hygiene protocol. Pulmonology input appreciated. Obesity Body mass index is 39.36 kg/m². CARA on ckd3. Monitor on lasix. Right basilar infiltrate likely asymmetric edema. Pneumonia unlikely. Antibiotics stopped. Postmenopausal vaginal bleeding. Close outpatient follow-up with OB/GYN. 9. PFT in 2017 consistent with restrictive lung disease  10. Former tobacco.  6. Prior levi diagnosis. Bipap qHS and prn. Repeat sleep studies w/ Dr. Christie Coello.   12. Patient meets criteria for low dose CT screen. 13. Suprapubic pain. UA. DVT prophylaxis-Lovenox  Full code. Transfer telemetry. Hh ordered. Patient did not qualify for home oxygen 2022. Discussed on IDR. Beth Zepeda MD  22      Case discussed with:  [x]Patient  []Family  [x]Nursing  [x]Case Management  DVT Prophylaxis:  [x]Lovenox  []Hep SQ  []SCDs  []Coumadin   []On Heparin gtt    Objective:   VS: Visit Vitals  /62   Pulse 89   Temp 98.8 °F (37.1 °C)   Resp 15   Ht 5' 4\" (1.626 m)   Wt 104 kg (229 lb 4.8 oz)   SpO2 97%   BMI 39.36 kg/m²        Tmax/24hrs: Temp (24hrs), Av.4 °F (36.9 °C), Min:97.9 °F (36.6 °C), Max:98.8 °F (37.1 °C)  IOBRIEF  Intake/Output Summary (Last 24 hours) at 2022 1330  Last data filed at 2022 1012  Gross per 24 hour   Intake 721.25 ml   Output --   Net 721.25 ml       General:  Alert, awake oriented x4. Speaking in full sentences on supplemental oxygen. HEENT normocephalic atraumatic pupils equally round reactive to light oropharynx clear  Pulmonary: improved air entry. No wheeze. No crackles. Cardiovascular: Regular rate and Rhythm. GI:  Soft, Non distended, Non tender. Normoactive bowel sounds. Extremities:  No edema, cyanosis, clubbing. No calf tenderness. Neurologic: Alert and oriented X 4. No acute neuro deficits. Additional: No rash to visible skin.     Medications:   Current Facility-Administered Medications   Medication Dose Route Frequency    [START ON 2022] furosemide (LASIX) tablet 40 mg  40 mg Oral DAILY    cloNIDine HCL (CATAPRES) tablet 0.1 mg  0.1 mg Oral BID    [START ON 2022] insulin glargine (LANTUS) injection 12 Units  12 Units SubCUTAneous DAILY    methylPREDNISolone (PF) (SOLU-MEDROL) injection 80 mg  80 mg IntraVENous Q12H    albuterol-ipratropium (DUO-NEB) 2.5 MG-0.5 MG/3 ML  3 mL Nebulization Q4H PRN    isosorbide mononitrate ER (IMDUR) tablet 60 mg  60 mg Oral DAILY    carvediloL (COREG) tablet 25 mg  25 mg Oral BID WITH MEALS hydrALAZINE (APRESOLINE) tablet 100 mg  100 mg Oral TID    amLODIPine (NORVASC) tablet 10 mg  10 mg Oral DAILY    aspirin chewable tablet 81 mg  81 mg Oral DAILY    atorvastatin (LIPITOR) tablet 40 mg  40 mg Oral QHS    gabapentin (NEURONTIN) capsule 400 mg  400 mg Oral DAILY    sodium chloride (NS) flush 5-40 mL  5-40 mL IntraVENous Q8H    sodium chloride (NS) flush 5-40 mL  5-40 mL IntraVENous PRN    acetaminophen (TYLENOL) tablet 650 mg  650 mg Oral Q6H PRN    Or    acetaminophen (TYLENOL) suppository 650 mg  650 mg Rectal Q6H PRN    polyethylene glycol (MIRALAX) packet 17 g  17 g Oral DAILY PRN    ondansetron (ZOFRAN ODT) tablet 4 mg  4 mg Oral Q8H PRN    Or    ondansetron (ZOFRAN) injection 4 mg  4 mg IntraVENous Q6H PRN    enoxaparin (LOVENOX) injection 40 mg  40 mg SubCUTAneous DAILY    insulin lispro (HUMALOG) injection   SubCUTAneous AC&HS    glucose chewable tablet 16 g  16 g Oral PRN    glucagon (GLUCAGEN) injection 1 mg  1 mg IntraMUSCular PRN    dextrose 10% infusion 0-250 mL  0-250 mL IntraVENous PRN    naloxone (NARCAN) injection 0.4 mg  0.4 mg IntraVENous EVERY 2 MINUTES AS NEEDED    oxyCODONE-acetaminophen (PERCOCET) 5-325 mg per tablet 1 Tablet  1 Tablet Oral Q4H PRN       Imaging:   XR Results (most recent):  Results from Hospital Encounter encounter on 09/09/22    XR CHEST PORT    Narrative  EXAM: XR CHEST PORT    CLINICAL INDICATION/HISTORY: 81 years Female. shortness of breath. thanks. Additional History: None    TECHNIQUE: Frontal view of the chest    COMPARISON: 9/9/2022    FINDINGS:    Multiple devices project over the patient. The cardiac silhouette appears within  normal limits. Mild pulmonary vascular congestion, unchanged. Aortic  calcifications noted. Improved but incompletely resolved interstitial opacities at the right lung  base. No definite evidence of pleural effusion or pneumothorax. No acute osseous abnormality appreciated.  Right-sided subacromial spur.    Impression  1. Improved but incompletely resolved interstitial opacities at the right lung  base, which may reflect atelectasis, pneumonia, or edema. 2.  Unchanged mild pulmonary vascular congestion. CT Results (most recent):  Results from Hospital Encounter encounter on 08/08/22    CTA CHEST W OR W WO CONT    Narrative  CT CHEST PULMONARY EMBOLISM PROTOCOL    CPT code: 85864    INDICATION: Dyspnea. Covid positive. Question pulmonary embolism. TECHNIQUE: 4.2ZF collimation helical images obtained through the level of the  pulmonary arteries with additional imaging through the chest following the  uneventful administration of 72 cc's nonionic intravenous contrast.  - Images reconstructed into three dimensional coronal and sagittal projections  for complete evaluation of the tortuous and overlapping pulmonary vascular  structures and to reduce patient radiation dose. All CT scans at this facility are performed using dose optimization technique as  appropriate to this specific exam, to include automated exposure control,  adjustment of the mA and/or KP according to patient size or use of iterative  reconstruction techniques. COMPARISON: Prior CT 7/8/2022    FINDINGS:  Opacification of the pulmonary arteries is adequate. No filling defects are appreciated within the main, left, right, lobar or  visualized segmental pulmonary arteries to suggest embolism. Main pulmonary artery is enlarged, measuring up to 3.8 cm diameter, similar to  the prior exam. Findings can be seen in setting of pulmonary artery  hypertension. Heart size top normal. No pericardial effusion. Nonenlarged aorta. Atherosclerotic calcification in the arch and at level of  proximal arch branch vessels. Geographic areas of mildly increased groundglass attenuation in the right upper  lobe fairly diffuse, without consolidation. Less extensive changes in the  superior aspect of the right middle lobe.  Similar findings in the superior  segment right lower lobe and ventral to central right lower lobe. -Dependent peripheral atelectasis/consolidation subsegmental right lower lobe  adjacent to a small effusion. Similar appearing subsegmental dependent atelectasis left lower lobe adjacent to  a trace effusion. Mild degree atherosclerotic narrowing of the proximal SMA. Impression  1. No CT evidence for central pulmonary embolism.  -Similar enlargement of central pulmonary arteries suggest pulmonary artery  hypertension. 2. Multifocal geographic and peribronchial groundglass airspace infiltrates more  significant in the right lung than left. Imaging appearance is not specifically  characteristic of Covid pneumonia and could reflect viral inflammation or less  likely developing asymmetric pulmonary edema. 3. Multisegmental dependent atelectasis/consolidation right lower lobe adjacent  to a small effusion.  -Similar but less extensive subsegmental atelectasis adjacent to trace left  effusion. 06/25/22    ECHO ADULT COMPLETE 06/29/2022 6/29/2022    Interpretation Summary  Formatting of this result is different from the original.      Left Ventricle: Mildly reduced left ventricular systolic function with a visually estimated EF of 50 - 55%. Left ventricle is mildly dilated. Increased wall thickness. Findings consistent with mild concentric hypertrophy. See diagram for wall motion findings. Diastolic dysfunction present with normal LV EF. Mitral Valve: Mildly thickened leaflet. Mild regurgitation with a centrally directed jet. Signed by: Raul Ortiz MD on 6/29/2022 12:12 PM       MRI Results (most recent):  No results found for this or any previous visit.         Labs:    Recent Results (from the past 48 hour(s))   GLUCOSE, POC    Collection Time: 09/11/22  3:45 PM   Result Value Ref Range    Glucose (POC) 167 (H) 70 - 110 mg/dL   GLUCOSE, POC    Collection Time: 09/11/22  9:38 PM   Result Value Ref Range    Glucose (POC) 174 (H) 70 - 142 mg/dL   METABOLIC PANEL, BASIC    Collection Time: 09/12/22  5:09 AM   Result Value Ref Range    Sodium 138 136 - 145 mmol/L    Potassium 4.8 3.5 - 5.5 mmol/L    Chloride 107 100 - 111 mmol/L    CO2 22 21 - 32 mmol/L    Anion gap 9 3.0 - 18 mmol/L    Glucose 161 (H) 74 - 99 mg/dL    BUN 80 (H) 7.0 - 18 MG/DL    Creatinine 2.46 (H) 0.6 - 1.3 MG/DL    BUN/Creatinine ratio 33 (H) 12 - 20      GFR est AA 24 (L) >60 ml/min/1.73m2    GFR est non-AA 19 (L) >60 ml/min/1.73m2    Calcium 8.7 8.5 - 10.1 MG/DL   PHOSPHORUS    Collection Time: 09/12/22  5:09 AM   Result Value Ref Range    Phosphorus 4.6 2.5 - 4.9 MG/DL   MAGNESIUM    Collection Time: 09/12/22  5:09 AM   Result Value Ref Range    Magnesium 1.7 1.6 - 2.6 mg/dL   GLUCOSE, POC    Collection Time: 09/12/22  8:16 AM   Result Value Ref Range    Glucose (POC) 141 (H) 70 - 110 mg/dL   GLUCOSE, POC    Collection Time: 09/12/22 11:54 AM   Result Value Ref Range    Glucose (POC) 184 (H) 70 - 110 mg/dL   GLUCOSE, POC    Collection Time: 09/12/22  4:28 PM   Result Value Ref Range    Glucose (POC) 178 (H) 70 - 110 mg/dL   GLUCOSE, POC    Collection Time: 09/12/22 10:32 PM   Result Value Ref Range    Glucose (POC) 380 (H) 70 - 877 mg/dL   METABOLIC PANEL, BASIC    Collection Time: 09/13/22  3:42 AM   Result Value Ref Range    Sodium 137 136 - 145 mmol/L    Potassium 5.4 3.5 - 5.5 mmol/L    Chloride 106 100 - 111 mmol/L    CO2 22 21 - 32 mmol/L    Anion gap 9 3.0 - 18 mmol/L    Glucose 221 (H) 74 - 99 mg/dL    BUN 78 (H) 7.0 - 18 MG/DL    Creatinine 2.28 (H) 0.6 - 1.3 MG/DL    BUN/Creatinine ratio 34 (H) 12 - 20      GFR est AA 26 (L) >60 ml/min/1.73m2    GFR est non-AA 21 (L) >60 ml/min/1.73m2    Calcium 8.5 8.5 - 10.1 MG/DL   GLUCOSE, POC    Collection Time: 09/13/22  7:11 AM   Result Value Ref Range    Glucose (POC) 222 (H) 70 - 110 mg/dL   GLUCOSE, POC    Collection Time: 09/13/22 10:45 AM   Result Value Ref Range    Glucose (POC) 299 (H) 70 - 110 mg/dL Signed By: Lynn Minor MD     September 13, 2022      I spent 25 minutes with the patient in face-to-face consultation, of which greater than 50% was spent in counseling and coordination of care as described above    Disclaimer: Sections of this note are dictated using utilizing voice recognition software. Minor typographical errors may be present. If questions arise, please do not hesitate to contact me or call our department.

## 2022-09-13 NOTE — DISCHARGE INSTRUCTIONS
DISCHARGE SUMMARY from Nurse    PATIENT INSTRUCTIONS:    After general anesthesia or intravenous sedation, for 24 hours or while taking prescription Narcotics:  Limit your activities  Do not drive and operate hazardous machinery  Do not make important personal or business decisions  Do  not drink alcoholic beverages  If you have not urinated within 8 hours after discharge, please contact your surgeon on call. Report the following to your surgeon:  Excessive pain, swelling, redness or odor of or around the surgical area  Temperature over 100.5  Nausea and vomiting lasting longer than 4 hours or if unable to take medications  Any signs of decreased circulation or nerve impairment to extremity: change in color, persistent  numbness, tingling, coldness or increase pain  Any questions    What to do at Home:  Recommended activity: Activity as tolerated, rest as needed     If you experience any of the following symptoms Chest pain, Dizziness, Fainting, Shortness of breath, please follow up with Primary Care Provider or  go tot he nearest Emergency Room. *  Please give a list of your current medications to your Primary Care Provider. *  Please update this list whenever your medications are discontinued, doses are      changed, or new medications (including over-the-counter products) are added. *  Please carry medication information at all times in case of emergency situations. These are general instructions for a healthy lifestyle:    No smoking/ No tobacco products/ Avoid exposure to second hand smoke  Surgeon General's Warning:  Quitting smoking now greatly reduces serious risk to your health.     Obesity, smoking, and sedentary lifestyle greatly increases your risk for illness    A healthy diet, regular physical exercise & weight monitoring are important for maintaining a healthy lifestyle    You may be retaining fluid if you have a history of heart failure or if you experience any of the following symptoms:  Weight gain of 3 pounds or more overnight or 5 pounds in a week, increased swelling in our hands or feet or shortness of breath while lying flat in bed. Please call your doctor as soon as you notice any of these symptoms; do not wait until your next office visit. The discharge information has been reviewed with the patient. The patient verbalized understanding. Discharge medications reviewed with the patient and appropriate educational materials and side effects teaching were provided.   ___________________________________________________________________________________________________________________________________

## 2022-09-13 NOTE — PROGRESS NOTES
Problem: Mobility Impaired (Adult and Pediatric)  Goal: *Acute Goals and Plan of Care (Insert Text)  Description: Physical Therapy Goals  Initiated 9/13/2022 and to be accomplished within 7 day(s)  1. Patient will move from supine to sit and sit to supine  in bed with modified independence. 2.  Patient will transfer from bed to chair and chair to bed with modified independence using the least restrictive device. 3.  Patient will perform sit to stand with modified independence. 4.  Patient will ambulate with modified independence for 100 feet with the least restrictive device. 5.  Patient will ascend/descend 4 stairs with  handrail(s) with supervision/set-up. PLOF: Patient lives with spouse in single story home with 4 JUSTICE. She was independent without AD. She has rollator at home. Outcome: Progressing Towards Goal    PHYSICAL THERAPY EVALUATION    Patient: Aubrey Eisenmenger (45 y.o. female)  Date: 9/13/2022  Primary Diagnosis: Acute respiratory failure with hypoxia ([facility]) [J96.01]  CHF (congestive heart failure) ([facility] [I50.9]       Precautions:   Fall      ASSESSMENT :  Based on the objective data described below, the patient presents with decreased strength and decreased endurance. Patient agreeable to PT evaluation. Supervision for bed mobility. C/o SOB on RA, O2 sats above 90 % through out session. Educated on activity pacing and energy conservation to increase safety with functional tasks. Sit to stand transfer SBA. Patient ambulates to the restroom using RW with SBA and verbal cues to stay inside the walker. Verbal cues for breathing technique. Patient declines to sit OOB in the chair. She wants to wait for her  to arrive. Educated to use call bell for nursing supervision with mobility. At end of session, patient resting in the bed with call bell in reach. Patient will benefit from skilled intervention to address the above impairments.   Patient's rehabilitation potential is considered to be Good  Factors which may influence rehabilitation potential include:   [x]         None noted  []         Mental ability/status  []         Medical condition  []         Home/family situation and support systems  []         Safety awareness  []         Pain tolerance/management  []         Other:      PLAN :  Recommendations and Planned Interventions:   [x]           Bed Mobility Training             [x]    Neuromuscular Re-Education  [x]           Transfer Training                   []    Orthotic/Prosthetic Training  [x]           Gait Training                          []    Modalities  [x]           Therapeutic Exercises           []    Edema Management/Control  [x]           Therapeutic Activities            [x]    Family Training/Education  [x]           Patient Education  []           Other (comment):    Frequency/Duration: Patient will be followed by physical therapy 1-2 times per day/4-7 days per week to address goals. Further Equipment Recommendations for Discharge: rolling walker    AMPAC:   Based on an AM-PAC score of 18/24 (or **/20 if omitting stairs) and their current functional mobility deficits, it is recommended that the patient have 2-3 sessions per week of Physical Therapy at d/c to increase the patient's independence. This AMPAC score should be considered in conjunction with interdisciplinary team recommendations to determine the most appropriate discharge setting. Patient's social support, diagnosis, medical stability, and prior level of function should also be taken into consideration. SUBJECTIVE:   Patient stated Mel Schaffer will get up when he gets here.     OBJECTIVE DATA SUMMARY:     Past Medical History:   Diagnosis Date    Abnormal WBC count 5/17/2016    Anemia     Bilateral shoulder pain 9/27/2016    Chondromalacia of both patellae     COPD (chronic obstructive pulmonary disease) ([facility] 9/2015    mild-mod dz; Dr Bi Zavaleta    Diabetes [facility]) 2013    Diabetic eye exam ([facility] 2016 Dilated cardiomyopathy ([facility]     Dyslipidemia     Gout     Heart attack ([facility] 10/18/2019    History of echocardiogram 11/14/2014    Mild LVE. EF 40%. Mild, diffuse hypk. Mild LAE. Mild MRSamara Hypercholesteremia 1/08/14    Hypertension 2000    Noncompliance with medications 2/16/2016    Obesity     Orthostatic hypotension 5/17/2016    Osteoarthritis of both knees     Pain management 04/01/2016    Dr. Chris Ward     Popliteal cyst, bilateral      Stage 3b chronic kidney disease ([facility] 8/8/2022    Vitamin D deficiency 10/16/14     Past Surgical History:   Procedure Laterality Date    HX HEART CATHETERIZATION      HX TUBAL LIGATION       Barriers to Learning/Limitations: None  Compensate with: N/A  Home Situation:  Home Situation  Home Environment: Private residence  # Steps to Enter: 4  One/Two Story Residence: One story  Living Alone: No  Support Systems: Spouse/Significant Other  Patient Expects to be Discharged to[de-identified] Home with family assistance  Current DME Used/Available at Home: [employer], straight, Walker, rolling, Shower chair  Tub or Shower Type: Tub/Shower combination  Critical Behavior:  Neurologic State: Alert  Orientation Level: Oriented X4  Cognition: Appropriate decision making; Appropriate for age attention/concentration; Appropriate safety awareness; Follows commands     Psychosocial  Patient Behaviors: Calm; Cooperative  Purposeful Interaction: Yes  Pt Identified Daily Priority: Clinical issues (comment)  Caritas Process: Nurture loving kindness;Enable calixto/hope;Establish trust;Teaching/learning; Attend basic human needs  Caring Interventions: Reassure  Reassure: Informing; Acceptance;Quiet presence  Therapeutic Modalities: Humor; Intentional therapeutic touch                 Strength:    Strength: Generally decreased, functional                    Tone & Sensation:   Tone: Normal              Sensation: Intact               Range Of Motion:  AROM: Within functional limits                         Functional Mobility:  Bed Mobility:     Supine to Sit: Supervision  Sit to Supine: Supervision     Transfers:  Sit to Stand: Supervision  Stand to Sit: Supervision                Balance:   Sitting: Intact  Standing: Impaired; With support  Standing - Static: Good  Standing - Dynamic : Good    Ambulation/Gait Training:  Distance (ft): 20 Feet (ft)  Assistive Device: Walker, rolling  Ambulation - Level of Assistance: Stand-by assistance  Gait Abnormalities: Decreased step clearance           Pain:  Pain level pre-treatment: 5/10 back pain  Pain level post-treatment: 5/10   Pain Intervention(s) : Medication (see MAR); Rest, Ice, Repositioning  Response to intervention: Nurse notified, See doc flow    Activity Tolerance:   Fair  Please refer to the flowsheet for vital signs taken during this treatment. After treatment:   []         Patient left in no apparent distress sitting up in chair  [x]         Patient left in no apparent distress in bed  [x]         Call bell left within reach  [x]         Nursing notified  []         Caregiver present  []         Bed alarm activated  []         SCDs applied    COMMUNICATION/EDUCATION:   []         Role of Physical Therapy in the acute care setting. [x]         Fall prevention education was provided and the patient/caregiver indicated understanding. [x]         Patient/family have participated as able in goal setting and plan of care. [x]         Patient/family agree to work toward stated goals and plan of care. []         Patient understands intent and goals of therapy, but is neutral about his/her participation. []         Patient is unable to participate in goal setting/plan of care: ongoing with therapy staff.  []         Other:     Thank you for this referral.  Angelo Sanchez, PT   Time Calculation: 21 mins      Eval Complexity: History: LOW Complexity : Zero comorbidities / personal factors that will impact the outcome / POCExam:LOW Complexity : 1-2 Standardized tests and measures addressing body structure, function, activity limitation and / or participation in recreation  Presentation: LOW Complexity : Stable, uncomplicated  Clinical Decision Making:Low Complexity    Overall Complexity:LOW       Lucinda Bello AM-PAC® Basic Mobility Inpatient Short Form (6-Clicks) Version 2    How much HELP from another person does the patient currently need    (If the patient hasn't done an activity recently, how much help from another person do you think he/she would need if he/she tried?)   Total (Total A or Dep)   A Lot  (Mod to Max A)   A Little (Sup or Min A)   None (Mod I to I)   Turning from your back to your side while in a flat bed without using bedrails? [] 1 [] 2 [x] 3 [] 4   2. Moving from lying on your back to sitting on the side of a flat bed without using bedrails? [] 1 [] 2 [x] 3 [] 4   3. Moving to and from a bed to a chair (including a wheelchair)? [] 1 [] 2 [x] 3 [] 4   4. Standing up from a chair using your arms (e.g., wheelchair, or bedside chair)? [] 1 [] 2 [x] 3 [] 4   5. Walking in hospital room? [] 1 [] 2 [x] 3 [] 4   6. Climbing 3-5 steps with a railing?+   [] 1 [] 2 [x] 3 [] 4   +If stair climbing cannot be assessed, skip item #6. Sum responses from items 1-5.

## 2022-09-14 ENCOUNTER — APPOINTMENT (OUTPATIENT)
Dept: VASCULAR SURGERY | Age: 72
DRG: 291 | End: 2022-09-14
Attending: PHYSICIAN ASSISTANT
Payer: MEDICARE

## 2022-09-14 LAB
ABDOMINAL PROX AORTA VEL: 74.4 CM/S
ANION GAP SERPL CALC-SCNC: 9 MMOL/L (ref 3–18)
BUN SERPL-MCNC: 80 MG/DL (ref 7–18)
BUN/CREAT SERPL: 37 (ref 12–20)
CALCIUM SERPL-MCNC: 9.3 MG/DL (ref 8.5–10.1)
CHLORIDE SERPL-SCNC: 106 MMOL/L (ref 100–111)
CO2 SERPL-SCNC: 22 MMOL/L (ref 21–32)
CREAT SERPL-MCNC: 2.17 MG/DL (ref 0.6–1.3)
GLUCOSE BLD STRIP.AUTO-MCNC: 206 MG/DL (ref 70–110)
GLUCOSE BLD STRIP.AUTO-MCNC: 262 MG/DL (ref 70–110)
GLUCOSE BLD STRIP.AUTO-MCNC: 279 MG/DL (ref 70–110)
GLUCOSE BLD STRIP.AUTO-MCNC: 291 MG/DL (ref 70–110)
GLUCOSE SERPL-MCNC: 202 MG/DL (ref 74–99)
LEFT INTERLOBAR EDV: 10.6 CM/S
LEFT INTERLOBAR PSV: 42.5 CM/S
LEFT INTERLOBAR RI: 0.8
LEFT KIDNEY LENGTH: 10.21 CM
LEFT KIDNEY WIDTH: 5.24 CM
LEFT RENAL DIST DIAS: 14.3 CM/S
LEFT RENAL DIST RAR: 0.95
LEFT RENAL DIST RI: 0.8
LEFT RENAL DIST SYS: 70.8 CM/S
LEFT RENAL LOWER PARENCHYMA MAX: 16.3 CM/S
LEFT RENAL LOWER PARENCHYMA MIN: 7.1 CM/S
LEFT RENAL LOWER PARENCHYMA RI: 0.56
LEFT RENAL MID DIAS: 12.1 CM/S
LEFT RENAL MID RAR: 0.95
LEFT RENAL MID RI: 0.83
LEFT RENAL MID SYS: 70.6 CM/S
LEFT RENAL MIDDLE PARENCHYMA MAX: 20 CM/S
LEFT RENAL MIDDLE PARENCHYMA MIN: 4.7 CM/S
LEFT RENAL MIDDLE PARENCHYMA RI: 0.77
LEFT RENAL ORIGIN DIAS: 18.5 CM/S
LEFT RENAL ORIGIN RAR: 2.48
LEFT RENAL ORIGIN RI: 0.9
LEFT RENAL ORIGIN SYS: 184.4 CM/S
LEFT RENAL PROX DIAS: 23.1 CM/S
LEFT RENAL PROX RAR: 1.42
LEFT RENAL PROX RI: 0.78
LEFT RENAL PROX SYS: 106 CM/S
LEFT RENAL UPPER PARENCHYMA MAX: 17.6 CM/S
LEFT RENAL UPPER PARENCHYMA MIN: 5.9 CM/S
LEFT RENAL UPPER PARENCHYMA RI: 0.66
POTASSIUM SERPL-SCNC: 5.5 MMOL/L (ref 3.5–5.5)
PROX AORTIC AP: 2.06 CM
PROX AORTIC TRANS: 2.04 CM
RIGHT INTERLOBAR EDV: 12.8 CM/S
RIGHT INTERLOBAR PSV: 46.8 CM/S
RIGHT INTERLOBAR RI: 0.7
RIGHT KIDNEY LENGTH: 10.62 CM
RIGHT KIDNEY WIDTH: 5.07 CM
RIGHT RENAL DIST DIAS: 14.3 CM/S
RIGHT RENAL DIST RAR: 1.03
RIGHT RENAL DIST RI: 0.81
RIGHT RENAL DIST SYS: 76.9 CM/S
RIGHT RENAL LOWER PARENCHYMA MAX: 19 CM/S
RIGHT RENAL LOWER PARENCHYMA MIN: 5.5 CM/S
RIGHT RENAL LOWER PARENCHYMA RI: 0.71
RIGHT RENAL MID DIAS: 14.3 CM/S
RIGHT RENAL MID RAR: 1.07
RIGHT RENAL MID RI: 0.82
RIGHT RENAL MID SYS: 79.3 CM/S
RIGHT RENAL MIDDLE PARENCHYMA MAX: 15.1 CM/S
RIGHT RENAL MIDDLE PARENCHYMA MIN: 5.1 CM/S
RIGHT RENAL MIDDLE PARENCHYMA RI: 0.66
RIGHT RENAL ORIGIN DIAS: 16.7 CM/S
RIGHT RENAL ORIGIN RAR: 1
RIGHT RENAL ORIGIN RI: 0.78
RIGHT RENAL ORIGIN SYS: 74.4 CM/S
RIGHT RENAL PROX DIAS: 10.3 CM/S
RIGHT RENAL PROX RAR: 0.85
RIGHT RENAL PROX RI: 0.84
RIGHT RENAL PROX SYS: 63.3 CM/S
RIGHT RENAL UPPER PARENCHYMA MAX: 19 CM/S
RIGHT RENAL UPPER PARENCHYMA MIN: 5.5 CM/S
RIGHT RENAL UPPER PARENCHYMA RI: 0.71
SODIUM SERPL-SCNC: 137 MMOL/L (ref 136–145)

## 2022-09-14 PROCEDURE — 74011250637 HC RX REV CODE- 250/637: Performed by: INTERNAL MEDICINE

## 2022-09-14 PROCEDURE — 65270000046 HC RM TELEMETRY

## 2022-09-14 PROCEDURE — 74011636637 HC RX REV CODE- 636/637: Performed by: HOSPITALIST

## 2022-09-14 PROCEDURE — 99232 SBSQ HOSP IP/OBS MODERATE 35: CPT | Performed by: HOSPITALIST

## 2022-09-14 PROCEDURE — 80048 BASIC METABOLIC PNL TOTAL CA: CPT

## 2022-09-14 PROCEDURE — 94762 N-INVAS EAR/PLS OXIMTRY CONT: CPT

## 2022-09-14 PROCEDURE — 74011000250 HC RX REV CODE- 250: Performed by: INTERNAL MEDICINE

## 2022-09-14 PROCEDURE — 74011250636 HC RX REV CODE- 250/636: Performed by: INTERNAL MEDICINE

## 2022-09-14 PROCEDURE — 94660 CPAP INITIATION&MGMT: CPT

## 2022-09-14 PROCEDURE — 94640 AIRWAY INHALATION TREATMENT: CPT

## 2022-09-14 PROCEDURE — 93975 VASCULAR STUDY: CPT

## 2022-09-14 PROCEDURE — 36415 COLL VENOUS BLD VENIPUNCTURE: CPT

## 2022-09-14 PROCEDURE — 74011250637 HC RX REV CODE- 250/637: Performed by: PHYSICIAN ASSISTANT

## 2022-09-14 PROCEDURE — 82962 GLUCOSE BLOOD TEST: CPT

## 2022-09-14 PROCEDURE — 99232 SBSQ HOSP IP/OBS MODERATE 35: CPT | Performed by: INTERNAL MEDICINE

## 2022-09-14 PROCEDURE — 77010033678 HC OXYGEN DAILY

## 2022-09-14 PROCEDURE — 2709999900 HC NON-CHARGEABLE SUPPLY

## 2022-09-14 RX ORDER — DILTIAZEM HYDROCHLORIDE 180 MG/1
180 CAPSULE, COATED, EXTENDED RELEASE ORAL DAILY
Status: DISCONTINUED | OUTPATIENT
Start: 2022-09-14 | End: 2022-09-15

## 2022-09-14 RX ORDER — ISOSORBIDE MONONITRATE 30 MG/1
30 TABLET, EXTENDED RELEASE ORAL DAILY
Status: DISCONTINUED | OUTPATIENT
Start: 2022-09-15 | End: 2022-09-15

## 2022-09-14 RX ORDER — INSULIN GLARGINE 100 [IU]/ML
15 INJECTION, SOLUTION SUBCUTANEOUS
Status: DISCONTINUED | OUTPATIENT
Start: 2022-09-14 | End: 2022-09-15

## 2022-09-14 RX ORDER — HYDRALAZINE HYDROCHLORIDE 50 MG/1
50 TABLET, FILM COATED ORAL 3 TIMES DAILY
Status: DISCONTINUED | OUTPATIENT
Start: 2022-09-14 | End: 2022-09-15

## 2022-09-14 RX ADMIN — SODIUM CHLORIDE, PRESERVATIVE FREE 10 ML: 5 INJECTION INTRAVENOUS at 06:17

## 2022-09-14 RX ADMIN — CLONIDINE HYDROCHLORIDE 0.1 MG: 0.1 TABLET ORAL at 17:14

## 2022-09-14 RX ADMIN — BUDESONIDE 500 MCG: 0.5 SUSPENSION RESPIRATORY (INHALATION) at 20:56

## 2022-09-14 RX ADMIN — SODIUM CHLORIDE, PRESERVATIVE FREE 10 ML: 5 INJECTION INTRAVENOUS at 06:16

## 2022-09-14 RX ADMIN — ATORVASTATIN CALCIUM 40 MG: 40 TABLET, FILM COATED ORAL at 21:59

## 2022-09-14 RX ADMIN — OXYCODONE HYDROCHLORIDE AND ACETAMINOPHEN 1 TABLET: 5; 325 TABLET ORAL at 12:13

## 2022-09-14 RX ADMIN — ARFORMOTEROL TARTRATE 15 MCG: 15 SOLUTION RESPIRATORY (INHALATION) at 20:56

## 2022-09-14 RX ADMIN — Medication 15 UNITS: at 12:09

## 2022-09-14 RX ADMIN — HYDRALAZINE HYDROCHLORIDE 50 MG: 50 TABLET, FILM COATED ORAL at 21:59

## 2022-09-14 RX ADMIN — Medication 9 UNITS: at 12:08

## 2022-09-14 RX ADMIN — Medication 6 UNITS: at 09:19

## 2022-09-14 RX ADMIN — Medication 4 UNITS: at 12:09

## 2022-09-14 RX ADMIN — ASPIRIN 81 MG CHEWABLE TABLET 81 MG: 81 TABLET CHEWABLE at 09:11

## 2022-09-14 RX ADMIN — CLONIDINE HYDROCHLORIDE 0.1 MG: 0.1 TABLET ORAL at 09:12

## 2022-09-14 RX ADMIN — SODIUM CHLORIDE, PRESERVATIVE FREE 10 ML: 5 INJECTION INTRAVENOUS at 17:11

## 2022-09-14 RX ADMIN — HYDRALAZINE HYDROCHLORIDE 100 MG: 50 TABLET, FILM COATED ORAL at 09:12

## 2022-09-14 RX ADMIN — GABAPENTIN 400 MG: 400 CAPSULE ORAL at 09:11

## 2022-09-14 RX ADMIN — Medication 4 UNITS: at 09:19

## 2022-09-14 RX ADMIN — Medication 9 UNITS: at 21:59

## 2022-09-14 RX ADMIN — OXYCODONE HYDROCHLORIDE AND ACETAMINOPHEN 1 TABLET: 5; 325 TABLET ORAL at 06:39

## 2022-09-14 RX ADMIN — OXYCODONE HYDROCHLORIDE AND ACETAMINOPHEN 1 TABLET: 5; 325 TABLET ORAL at 17:16

## 2022-09-14 RX ADMIN — OXYCODONE HYDROCHLORIDE AND ACETAMINOPHEN 1 TABLET: 5; 325 TABLET ORAL at 21:59

## 2022-09-14 RX ADMIN — Medication 9 UNITS: at 17:11

## 2022-09-14 RX ADMIN — DILTIAZEM HYDROCHLORIDE 180 MG: 180 CAPSULE, COATED, EXTENDED RELEASE ORAL at 09:12

## 2022-09-14 RX ADMIN — Medication 4 UNITS: at 17:12

## 2022-09-14 RX ADMIN — ISOSORBIDE MONONITRATE 60 MG: 60 TABLET, EXTENDED RELEASE ORAL at 09:12

## 2022-09-14 RX ADMIN — HYDRALAZINE HYDROCHLORIDE 50 MG: 50 TABLET, FILM COATED ORAL at 17:11

## 2022-09-14 RX ADMIN — METHYLPREDNISOLONE SODIUM SUCCINATE 40 MG: 40 INJECTION, POWDER, FOR SOLUTION INTRAMUSCULAR; INTRAVENOUS at 09:09

## 2022-09-14 NOTE — PROGRESS NOTES
Bedside shift change report given to Trjoe Burris (oncoming nurse) by Farrah Seo (offgoing nurse). Report included the following information SBAR, ED Summary, Procedure Summary, Intake/Output, MAR, Recent Results, and Cardiac Rhythm NSR .      Wound Prevention Checklist    Patient: Marianna Wen (61 y.o. female)  Date: 9/14/2022  Diagnosis: Acute respiratory failure with hypoxia ([facility]) [J96.01]  CHF (congestive heart failure) ([facility] [I50.9] <principal problem not specified>    Precautions: Fall       []  Heel prevention boots placed on patient    []  Patient turned q2h during shift    []  Lift team ordered    [x]  Patient on [place] bed/Specialty bed    []  Each Wound is documented during shift (Stage, Color, drainage, odor, measurements, and dressings)    [x]  Dual skin checks done at bedside during shift report with Marilin Kearney RN     0000 Patient NPO for procedure in AM

## 2022-09-14 NOTE — PROGRESS NOTES
Cardiology Progress Note    Admit Date: 9/9/2022  Attending Cardiologist: Dr. Chacha Aguilar      Assessment:     -Acute hypoxic respiratory failure in setting of below, s/p bipap. Improved. -Flash pulmonary edema in setting of severely elevated blood pressures.   -Chest pain, atypical with pleuritic component. MI r/o with serial negative cardiac enzymes. No acute ischemic changes on ECG. -Hypertensive crisis, Max /121 mmHg. Improved. -a/c HFpEF. Last Echo in June 2022 with normal LVEF.   -CAD, s/p prox LAD PCI/YADIRA (2018); cardiac cath 10/2019 with findings as follows. LAD: Previously placed ostial to proximal LAD stent is widely patent. D1 with ostial 50% stenosis. -CKD  -Anemia  -HTN  -DM  -Hypercholesterolemia, on statin  -COPD        Primary cardiologist is Dr. Nataliya Coello:     Continue to hold diuretics with worsening renal function. Nephrology following, appreciate assistance. Coreg d/c given underlying COPD, continue cardizem. Continued on clonidine, hydralazine. Titrate Clonidine as needed. Renal duplex today, results pending. COPD mgmt per pulmonary, appreciate assistance. Continue ASA and statin. Subjective:     Reports increased wheezing with activity. Slept well last night with CPAP machine. Orthopnea improved.    Objective:      Patient Vitals for the past 8 hrs:   Temp Pulse Resp BP SpO2   09/14/22 0817 98 °F (36.7 °C) 79 20 95/81 95 %   09/14/22 0400 98.4 °F (36.9 °C) 93 -- (!) 147/52 98 %           Patient Vitals for the past 96 hrs:   Weight   09/13/22 1000 104 kg (229 lb 4.8 oz)   09/12/22 0509 100.7 kg (222 lb)   09/11/22 0312 102.1 kg (225 lb)         TELE: SR               Current Facility-Administered Medications   Medication Dose Route Frequency Last Admin    furosemide (LASIX) tablet 40 mg  40 mg Oral DAILY      cloNIDine HCL (CATAPRES) tablet 0.1 mg  0.1 mg Oral BID 0.1 mg at 09/13/22 1731    insulin lispro (HUMALOG) injection 4 Units  4 Units SubCUTAneous TIDAC 4 Units at 09/13/22 1736    insulin glargine (LANTUS) injection 12 Units  12 Units SubCUTAneous ACD 12 Units at 09/13/22 1737    arformoteroL (BROVANA) neb solution 15 mcg  15 mcg Nebulization BID RT 15 mcg at 09/13/22 2142    budesonide (PULMICORT) 500 mcg/2 ml nebulizer suspension  500 mcg Nebulization BID  mcg at 09/13/22 2142    methylPREDNISolone (PF) (SOLU-MEDROL) injection 40 mg  40 mg IntraVENous Q12H 40 mg at 09/13/22 2137    albuterol-ipratropium (DUO-NEB) 2.5 MG-0.5 MG/3 ML  3 mL Nebulization Q4H PRN      isosorbide mononitrate ER (IMDUR) tablet 60 mg  60 mg Oral DAILY 60 mg at 09/13/22 0908    carvediloL (COREG) tablet 25 mg  25 mg Oral BID WITH MEALS 25 mg at 09/13/22 1732    hydrALAZINE (APRESOLINE) tablet 100 mg  100 mg Oral  mg at 09/13/22 2137    amLODIPine (NORVASC) tablet 10 mg  10 mg Oral DAILY 10 mg at 09/13/22 7704    aspirin chewable tablet 81 mg  81 mg Oral DAILY 81 mg at 09/13/22 0908    atorvastatin (LIPITOR) tablet 40 mg  40 mg Oral QHS 40 mg at 09/13/22 2137    gabapentin (NEURONTIN) capsule 400 mg  400 mg Oral DAILY 400 mg at 09/13/22 0906    sodium chloride (NS) flush 5-40 mL  5-40 mL IntraVENous Q8H 10 mL at 09/14/22 0617    sodium chloride (NS) flush 5-40 mL  5-40 mL IntraVENous PRN      acetaminophen (TYLENOL) tablet 650 mg  650 mg Oral Q6H  mg at 09/09/22 2002    Or    acetaminophen (TYLENOL) suppository 650 mg  650 mg Rectal Q6H PRN      polyethylene glycol (MIRALAX) packet 17 g  17 g Oral DAILY PRN      ondansetron (ZOFRAN ODT) tablet 4 mg  4 mg Oral Q8H PRN      Or    ondansetron (ZOFRAN) injection 4 mg  4 mg IntraVENous Q6H PRN      enoxaparin (LOVENOX) injection 40 mg  40 mg SubCUTAneous DAILY 40 mg at 09/10/22 0912    insulin lispro (HUMALOG) injection   SubCUTAneous AC&HS 6 Units at 09/13/22 2135    glucose chewable tablet 16 g  16 g Oral PRN      glucagon (GLUCAGEN) injection 1 mg  1 mg IntraMUSCular PRN      dextrose 10% infusion 0-250 mL  0-250 mL IntraVENous PRN      naloxone (NARCAN) injection 0.4 mg  0.4 mg IntraVENous EVERY 2 MINUTES AS NEEDED      oxyCODONE-acetaminophen (PERCOCET) 5-325 mg per tablet 1 Tablet  1 Tablet Oral Q4H PRN 1 Tablet at 09/14/22 0639         Intake/Output Summary (Last 24 hours) at 9/14/2022 0830  Last data filed at 9/14/2022 0000  Gross per 24 hour   Intake 720 ml   Output 550 ml   Net 170 ml         Physical Exam:  General:  alert, cooperative, no distress, appears stated age  Neck:  no JVD  Lungs:  expiratory wheezes B/L   Heart:  regular rate and rhythm, S1, S2 normal, no murmur, click, rub or gallop  Abdomen:  abdomen is soft without significant tenderness, masses, organomegaly or guarding  Extremities:  extremities normal, atraumatic, no cyanosis or edema    Visit Vitals  BP 95/81   Pulse 79   Temp 98 °F (36.7 °C)   Resp 20   Ht 5' 4\" (1.626 m)   Wt 104 kg (229 lb 4.8 oz)   SpO2 95%   BMI 39.36 kg/m²       Data Review:     Labs: Results:       Chemistry Recent Labs     09/14/22  0524 09/13/22  0342 09/12/22  0509   * 221* 161*    137 138   K 5.5 5.4 4.8    106 107   CO2 22 22 22   BUN 80* 78* 80*   CREA 2.17* 2.28* 2.46*   CA 9.3 8.5 8.7   MG  --   --  1.7   PHOS  --   --  4.6   AGAP 9 9 9   BUCR 37* 34* 33*        CBC w/Diff No results for input(s): WBC, RBC, HGB, HCT, PLT, GRANS, LYMPH, EOS, HGBEXT, HCTEXT, PLTEXT, HGBEXT, HCTEXT, PLTEXT in the last 72 hours. Cardiac Enzymes No results found for: CPK, CK, CKMMB, CKMB, RCK3, CKMBT, CKNDX, CKND1, GODFREY, TROPT, TROIQ, SANG, TROPT, TNIPOC, BNP, BNPP   Coagulation No results for input(s): PTP, INR, APTT, INREXT, INREXT in the last 72 hours.       Lipid Panel Lab Results   Component Value Date/Time    Cholesterol, total 152 10/10/2019 12:00 AM    HDL Cholesterol 46 10/10/2019 12:00 AM    LDL, calculated 86 10/10/2019 12:00 AM    VLDL, calculated 20 10/10/2019 12:00 AM    Triglyceride 101 10/10/2019 12:00 AM    CHOL/HDL Ratio 5.6 (H) 05/21/2018 06:23 AM BNP No results found for: BNP, BNPP, XBNPT   Liver Enzymes No results for input(s): TP, ALB, TBIL, AP in the last 72 hours.     No lab exists for component: SGOT, GPT, DBIL     Thyroid Studies Lab Results   Component Value Date/Time    TSH 0.21 (L) 03/19/2021 04:00 AM          Signed By: Jose L Smallwood PA-C     September 14, 2022

## 2022-09-14 NOTE — PROGRESS NOTES
Progress Note  Pulmonary, Critical Care, and Sleep Medicine    Name: Charlie Castillo MRN: [id number]   : 1950 Hospital: [facility]   Date: 2022        IMPRESSION:   Wheezing in a former smoker possibly bronchospasm/undiagnosed COPD vs \"cardiac asthma\" in the setting of chronic diastolic heart failure, favor the latter Echo from  with EF 45% and mild to moderate mitral regurgitation  Acute hypoxic respiratory failure related to flash pulmonary edema. Improved with BIPAP  Hypertensive crisis  TESSA previously on CPAP. Was followed by Dr. Jyoti Gonsalves  DM   Acute on chronic kidney disease  Ground glass opacities on CT from August probably due to COVID vs heart failure  History of COPD although PFT in  consistent with restrictive lung disease  History of STEMI in 2018 requiring intubation and mechanical ventilation  Former smoker 46 PY, quit in 2019  Chronic pain syndrome  Obesity Body mass index is 38.11 kg/m². Recent COVID positive       PLAN:   Prn bronchodilators  Continue BIPAP HS and prn gregor   Needs outpatient sleep study/titration, pt to follow with Dr. Candance Long ordered as unable to R/o COPD  Solumedrol and taper as improvement noted. Outpatient PFT's and 6 minute walk, will arrange office follow up  Prophylaxis issues per primary team     Subjective/Interval History:   I have reviewed the flowsheet and previous days notes. Reviewed interval history. Discussed management with nursing staff.    22  Notes improvement , rare chest heaviness and SOB  BIPAP resumed overnight. No chest pain      ROS:Pertinent items are noted in HPI. Orders reviewed including medications. Changes made if indicated. Telemetry monitor reviewed at the bedside.   Objective:   Vital Signs:    Visit Vitals  BP 95/81   Pulse 79   Temp 98 °F (36.7 °C)   Resp 20   Ht 5' 4\" (1.626 m)   Wt 104 kg (229 lb 4.8 oz)   SpO2 95%   BMI 39.36 kg/m²       O2 Device: None (Room air)   O2 Flow Rate (L/min): 2 l/min   Temp (24hrs), Av.3 °F (36.8 °C), Min:97.9 °F (36.6 °C), Max:98.8 °F (37.1 °C)       Intake/Output:   Last shift:      No intake/output data recorded. Last 3 shifts:  1901 -  0700  In: 720 [P.O.:720]  Out: 550 [Urine:550]    Intake/Output Summary (Last 24 hours) at 2022 0935  Last data filed at 2022 0000  Gross per 24 hour   Intake 720 ml   Output 550 ml   Net 170 ml        Physical Exam:    General:  Alert, cooperative, in no distress, appears stated age. Head:  Normocephalic, without obvious abnormality, atraumatic. Eyes:  ANicteric, PERRL,   Nose: Nares normal. Mucosa normal. No drainage or sinus tenderness. Throat: Lips, mucosa, and tongue normal.     Neck: Supple, symmetrical, trachea midline, no adenopathy, thyroid: no enlargment/tenderness/nodules, no carotid bruit and no JVD    Back:   Symmetric    Lungs:   Bilateral auscultation distant breath sounds, no rales or wheezes   Chest wall:  No tenderness or deformity. NO intercostal retractions   Heart:  Regular rate and rhythm, S1, S2 normal, no murmur, click, rub or gallop. Abdomen:   Soft, non-tender. Bowel sounds normal. No masses,  No organomegaly. NO paradoxical motion   Extremities: normal, atraumatic, no cyanosis, trace pedal edema   Pulses: 2+ and symmetric all extremities. Skin: Skin color, texture, turgor normal. No rashes or lesions. NO clubbing   Lymph nodes: Cervical, supraclavicular nodes normal.   Neurologic: Grossly nonfocal      :        Devices:             Drips:    DATA:  Labs:  No results for input(s): WBC, HGB, HCT, PLT, HGBEXT, HCTEXT, PLTEXT, HGBEXT, HCTEXT, PLTEXT in the last 72 hours.   Recent Labs     22  0524 22  0342 22  0509    137 138   K 5.5 5.4 4.8    106 107   CO2    * 221* 161*   BUN 80* 78* 80*   CREA 2.17* 2.28* 2.46*   CA 9.3 8.5 8.7   MG  --   --  1.7   PHOS  --   --  4.6     No results for input(s): PH, PCO2, PO2, HCO3, FIO2 in the last 72 hours. Imaging:  [x]        I have personally reviewed the patients radiographs and reports  []         []        No change from prior, tubes and lines in adequate position  [x]        Improved   []        Worsening    XR Results (most recent):  Results from Hospital Encounter encounter on 09/09/22    XR CHEST PORT    Narrative  EXAM: XR CHEST PORT    CLINICAL INDICATION/HISTORY: 81 years Female. shortness of breath. thanks. Additional History: None    TECHNIQUE: Frontal view of the chest    COMPARISON: 9/9/2022    FINDINGS:    Multiple devices project over the patient. The cardiac silhouette appears within  normal limits. Mild pulmonary vascular congestion, unchanged. Aortic  calcifications noted. Improved but incompletely resolved interstitial opacities at the right lung  base. No definite evidence of pleural effusion or pneumothorax. No acute osseous abnormality appreciated. Right-sided subacromial spur. Impression  1. Improved but incompletely resolved interstitial opacities at the right lung  base, which may reflect atelectasis, pneumonia, or edema. 2.  Unchanged mild pulmonary vascular congestion. High complexity decision making was performed during this consultation and evaluation.  Critical care time exclusive of procedures spent managing the patient:      maria esther Hickey MD

## 2022-09-14 NOTE — PROGRESS NOTES
Bedside and Verbal shift change report given to  Yudy Rosenbaum RN (On-coming nurse) from [facility] (Off-going nurse).   Report included the following information SBAR, Kardex, Intake/Output, Recent Results, and Cardiac Rhythm NSR

## 2022-09-14 NOTE — PROGRESS NOTES
[facility] Hospitalist Group  Progress Note    Patient: Eligio Haq Age: 81 y.o. : 1950 MR#: [id number] SSN: [id number]  Date/Time: 2022     Subjective:     32-year-old female with a history of chronic diastolic CHF, COPD presents to the hospital secondary to shortness of breath. Patient noted to have elevated blood pressure in the emergency room, started on nitro gtt. Patient admitted to stepdown unit for further evaluation. Coreg discontinued given underlying COPD, continued on cardizem. Lasix held. Patient seen and examined at bedside, she tolerated BiPAP overnight and slept better. She had sleep study done with Dr. Bishnu Sheriff in the past, but the machine was recalled. Chest and back pain has improved some. She still has orthopnea and dyspnea on exertion. Appetite has been good. Assessment/Plan:     Hypertensive emergency-blood pressure improving, off nitro gtt. , continue hydralazine, Coreg, amlodipine, catapres. Avoid ACE ARB given CARA. Diuretic held at this time. Acute on chronic diastolic CHF exacerbation. Diuretics held. Check BMP tomorrow morning. DM2 with steroid-induced hyperglycemia. Increase Lantus. Premeal insulin. SSI. Diabetes management follows. Mild to moderate COPD with ? exacerbation. Stop steroids and monitor. Bronchodilators. RT for bronchial hygiene protocol. Pulmonology input appreciated. Obesity Body mass index is 39.36 kg/m². CARA on ckd3. Monitor off Lasix at this time. Nephrology follows. Right basilar infiltrate likely asymmetric edema. Pneumonia unlikely. Antibiotics stopped. Postmenopausal vaginal bleeding. Close outpatient follow-up with OB/GYN. 9. PFT in 2017 consistent with restrictive lung disease  10. Former tobacco.  6. Prior levi diagnosis. Bipap qHS and prn. Repeat sleep studies w/ Dr. Bishnu Sheriff. 12. Patient meets criteria for low dose CT screen. 13. Suprapubic pain. UA negative.    DVT prophylaxis-Lovenox  Full code. Transfer telemetry. Hh ordered. Patient did not qualify for home oxygen 2022. Discussed on IDR. Bart Logan MD  22      Case discussed with:  [x]Patient  []Family  [x]Nursing  [x]Case Management  DVT Prophylaxis:  [x]Lovenox  []Hep SQ  []SCDs  []Coumadin   []On Heparin gtt    Objective:   VS: Visit Vitals  BP 95/81   Pulse 79   Temp 98 °F (36.7 °C)   Resp 20   Ht 5' 4\" (1.626 m)   Wt 104 kg (229 lb 4.8 oz)   SpO2 95%   BMI 39.36 kg/m²        Tmax/24hrs: Temp (24hrs), Av.3 °F (36.8 °C), Min:97.9 °F (36.6 °C), Max:98.8 °F (37.1 °C)  IOBRIEF  Intake/Output Summary (Last 24 hours) at 2022 0908  Last data filed at 2022 0000  Gross per 24 hour   Intake 720 ml   Output 550 ml   Net 170 ml       General:  Alert, awake oriented x4. Speaking in full sentences on supplemental oxygen. HEENT normocephalic atraumatic pupils equally round reactive to light oropharynx clear  Pulmonary: improved air entry. No wheeze. No crackles. Cardiovascular: Regular rate and Rhythm. GI:  Soft, Non distended, Non tender. Normoactive bowel sounds. Extremities:  No edema, cyanosis, clubbing. No calf tenderness. Neurologic: Alert and oriented X 4. No acute neuro deficits. Additional: No rash to visible skin.     Medications:   Current Facility-Administered Medications   Medication Dose Route Frequency    dilTIAZem ER (CARDIZEM CD) capsule 180 mg  180 mg Oral DAILY    [Held by provider] furosemide (LASIX) tablet 40 mg  40 mg Oral DAILY    cloNIDine HCL (CATAPRES) tablet 0.1 mg  0.1 mg Oral BID    insulin lispro (HUMALOG) injection 4 Units  4 Units SubCUTAneous TIDAC    insulin glargine (LANTUS) injection 12 Units  12 Units SubCUTAneous ACD    arformoteroL (BROVANA) neb solution 15 mcg  15 mcg Nebulization BID RT    budesonide (PULMICORT) 500 mcg/2 ml nebulizer suspension  500 mcg Nebulization BID RT    methylPREDNISolone (PF) (SOLU-MEDROL) injection 40 mg  40 mg IntraVENous Q12H    albuterol-ipratropium (DUO-NEB) 2.5 MG-0.5 MG/3 ML  3 mL Nebulization Q4H PRN    isosorbide mononitrate ER (IMDUR) tablet 60 mg  60 mg Oral DAILY    hydrALAZINE (APRESOLINE) tablet 100 mg  100 mg Oral TID    aspirin chewable tablet 81 mg  81 mg Oral DAILY    atorvastatin (LIPITOR) tablet 40 mg  40 mg Oral QHS    gabapentin (NEURONTIN) capsule 400 mg  400 mg Oral DAILY    sodium chloride (NS) flush 5-40 mL  5-40 mL IntraVENous Q8H    sodium chloride (NS) flush 5-40 mL  5-40 mL IntraVENous PRN    acetaminophen (TYLENOL) tablet 650 mg  650 mg Oral Q6H PRN    Or    acetaminophen (TYLENOL) suppository 650 mg  650 mg Rectal Q6H PRN    polyethylene glycol (MIRALAX) packet 17 g  17 g Oral DAILY PRN    ondansetron (ZOFRAN ODT) tablet 4 mg  4 mg Oral Q8H PRN    Or    ondansetron (ZOFRAN) injection 4 mg  4 mg IntraVENous Q6H PRN    enoxaparin (LOVENOX) injection 40 mg  40 mg SubCUTAneous DAILY    insulin lispro (HUMALOG) injection   SubCUTAneous AC&HS    glucose chewable tablet 16 g  16 g Oral PRN    glucagon (GLUCAGEN) injection 1 mg  1 mg IntraMUSCular PRN    dextrose 10% infusion 0-250 mL  0-250 mL IntraVENous PRN    naloxone (NARCAN) injection 0.4 mg  0.4 mg IntraVENous EVERY 2 MINUTES AS NEEDED    oxyCODONE-acetaminophen (PERCOCET) 5-325 mg per tablet 1 Tablet  1 Tablet Oral Q4H PRN       Imaging:   XR Results (most recent):  Results from Hospital Encounter encounter on 09/09/22    XR CHEST PORT    Narrative  EXAM: XR CHEST PORT    CLINICAL INDICATION/HISTORY: 81 years Female. shortness of breath. thanks. Additional History: None    TECHNIQUE: Frontal view of the chest    COMPARISON: 9/9/2022    FINDINGS:    Multiple devices project over the patient. The cardiac silhouette appears within  normal limits. Mild pulmonary vascular congestion, unchanged. Aortic  calcifications noted. Improved but incompletely resolved interstitial opacities at the right lung  base.     No definite evidence of pleural effusion or pneumothorax. No acute osseous abnormality appreciated. Right-sided subacromial spur. Impression  1. Improved but incompletely resolved interstitial opacities at the right lung  base, which may reflect atelectasis, pneumonia, or edema. 2.  Unchanged mild pulmonary vascular congestion. CT Results (most recent):  Results from Hospital Encounter encounter on 08/08/22    CTA CHEST W OR W WO CONT    Narrative  CT CHEST PULMONARY EMBOLISM PROTOCOL    CPT code: 79654    INDICATION: Dyspnea. Covid positive. Question pulmonary embolism. TECHNIQUE: 8.4JR collimation helical images obtained through the level of the  pulmonary arteries with additional imaging through the chest following the  uneventful administration of 72 cc's nonionic intravenous contrast.  - Images reconstructed into three dimensional coronal and sagittal projections  for complete evaluation of the tortuous and overlapping pulmonary vascular  structures and to reduce patient radiation dose. All CT scans at this facility are performed using dose optimization technique as  appropriate to this specific exam, to include automated exposure control,  adjustment of the mA and/or KP according to patient size or use of iterative  reconstruction techniques. COMPARISON: Prior CT 7/8/2022    FINDINGS:  Opacification of the pulmonary arteries is adequate. No filling defects are appreciated within the main, left, right, lobar or  visualized segmental pulmonary arteries to suggest embolism. Main pulmonary artery is enlarged, measuring up to 3.8 cm diameter, similar to  the prior exam. Findings can be seen in setting of pulmonary artery  hypertension. Heart size top normal. No pericardial effusion. Nonenlarged aorta. Atherosclerotic calcification in the arch and at level of  proximal arch branch vessels. Geographic areas of mildly increased groundglass attenuation in the right upper  lobe fairly diffuse, without consolidation. Less extensive changes in the  superior aspect of the right middle lobe. Similar findings in the superior  segment right lower lobe and ventral to central right lower lobe. -Dependent peripheral atelectasis/consolidation subsegmental right lower lobe  adjacent to a small effusion. Similar appearing subsegmental dependent atelectasis left lower lobe adjacent to  a trace effusion. Mild degree atherosclerotic narrowing of the proximal SMA. Impression  1. No CT evidence for central pulmonary embolism.  -Similar enlargement of central pulmonary arteries suggest pulmonary artery  hypertension. 2. Multifocal geographic and peribronchial groundglass airspace infiltrates more  significant in the right lung than left. Imaging appearance is not specifically  characteristic of Covid pneumonia and could reflect viral inflammation or less  likely developing asymmetric pulmonary edema. 3. Multisegmental dependent atelectasis/consolidation right lower lobe adjacent  to a small effusion.  -Similar but less extensive subsegmental atelectasis adjacent to trace left  effusion. 06/25/22    ECHO ADULT COMPLETE 06/29/2022 6/29/2022    Interpretation Summary  Formatting of this result is different from the original.      Left Ventricle: Mildly reduced left ventricular systolic function with a visually estimated EF of 50 - 55%. Left ventricle is mildly dilated. Increased wall thickness. Findings consistent with mild concentric hypertrophy. See diagram for wall motion findings. Diastolic dysfunction present with normal LV EF. Mitral Valve: Mildly thickened leaflet. Mild regurgitation with a centrally directed jet. Signed by: Cachorro Wakefield MD on 6/29/2022 12:12 PM       MRI Results (most recent):  No results found for this or any previous visit.         Labs:    Recent Results (from the past 48 hour(s))   GLUCOSE, POC    Collection Time: 09/12/22 11:54 AM   Result Value Ref Range    Glucose (POC) 184 (H) 70 - 110 mg/dL   GLUCOSE, POC    Collection Time: 09/12/22  4:28 PM   Result Value Ref Range    Glucose (POC) 178 (H) 70 - 110 mg/dL   GLUCOSE, POC    Collection Time: 09/12/22 10:32 PM   Result Value Ref Range    Glucose (POC) 380 (H) 70 - 328 mg/dL   METABOLIC PANEL, BASIC    Collection Time: 09/13/22  3:42 AM   Result Value Ref Range    Sodium 137 136 - 145 mmol/L    Potassium 5.4 3.5 - 5.5 mmol/L    Chloride 106 100 - 111 mmol/L    CO2 22 21 - 32 mmol/L    Anion gap 9 3.0 - 18 mmol/L    Glucose 221 (H) 74 - 99 mg/dL    BUN 78 (H) 7.0 - 18 MG/DL    Creatinine 2.28 (H) 0.6 - 1.3 MG/DL    BUN/Creatinine ratio 34 (H) 12 - 20      GFR est AA 26 (L) >60 ml/min/1.73m2    GFR est non-AA 21 (L) >60 ml/min/1.73m2    Calcium 8.5 8.5 - 10.1 MG/DL   GLUCOSE, POC    Collection Time: 09/13/22  7:11 AM   Result Value Ref Range    Glucose (POC) 222 (H) 70 - 110 mg/dL   GLUCOSE, POC    Collection Time: 09/13/22 10:45 AM   Result Value Ref Range    Glucose (POC) 299 (H) 70 - 110 mg/dL   GLUCOSE, POC    Collection Time: 09/13/22  3:45 PM   Result Value Ref Range    Glucose (POC) 233 (H) 70 - 110 mg/dL   URINALYSIS W/MICROSCOPIC    Collection Time: 09/13/22  4:17 PM   Result Value Ref Range    Color YELLOW      Appearance CLEAR      Specific gravity 1.011 1.005 - 1.030      pH (UA) 5.0 5.0 - 8.0      Protein Negative NEG mg/dL    Glucose Negative NEG mg/dL    Ketone Negative NEG mg/dL    Bilirubin Negative NEG      Blood Negative NEG      Urobilinogen 0.2 0.2 - 1.0 EU/dL    Nitrites Negative NEG      Leukocyte Esterase Negative NEG      WBC 0 to 1 0 - 4 /hpf    RBC 0 to 1 0 - 5 /hpf    Epithelial cells FEW 0 - 5 /lpf    Bacteria Negative NEG /hpf   GLUCOSE, POC    Collection Time: 09/13/22  9:01 PM   Result Value Ref Range    Glucose (POC) 218 (H) 70 - 763 mg/dL   METABOLIC PANEL, BASIC    Collection Time: 09/14/22  5:24 AM   Result Value Ref Range    Sodium 137 136 - 145 mmol/L    Potassium 5.5 3.5 - 5.5 mmol/L    Chloride 106 100 - 111 mmol/L    CO2 22 21 - 32 mmol/L    Anion gap 9 3.0 - 18 mmol/L    Glucose 202 (H) 74 - 99 mg/dL    BUN 80 (H) 7.0 - 18 MG/DL    Creatinine 2.17 (H) 0.6 - 1.3 MG/DL    BUN/Creatinine ratio 37 (H) 12 - 20      GFR est AA 27 (L) >60 ml/min/1.73m2    GFR est non-AA 22 (L) >60 ml/min/1.73m2    Calcium 9.3 8.5 - 10.1 MG/DL   GLUCOSE, POC    Collection Time: 09/14/22  7:21 AM   Result Value Ref Range    Glucose (POC) 206 (H) 70 - 110 mg/dL   DUPLEX RENAL ART/AMISH BILATERAL    Collection Time: 09/14/22  8:30 AM   Result Value Ref Range    Right renal lower parenchyma max 19.0 cm/s    Right renal lower parenchyma min 5.5 cm/s    Right renal middle parenchyma max 15.1 cm/s    Right renal middle parenchyma min 5.1 cm/s    Right renal upper parenchyma max 19.0 cm/s    Right renal upper parenchyma min 5.5 cm/s    Right Interlobar PSV 46.8 cm/s    Right Interlobar EDV 12.8 cm/s    Right renal dist sys 76.9 cm/s    Right renal dist carter 14.3 cm/s    Right Renal Dist RI 0.81     Right renal mid sys 79.3 cm/s    Right renal mid carter 14.3 cm/s    Right Renal Mid RI 0.82     Right renal prox sys 63.3 cm/s    Right renal prox carter 10.3 cm/s    Right Renal Prox RI 0.84     Right renal origin sys 74.4 cm/s    Right renal origin carter 16.7 cm/s    Left renal lower parenchyma max 16.3 cm/s    Left renal lower parenchyma min 7.1 cm/s    Left renal middle parenchyma max 20.0 cm/s    Left renal middle parenchyma min 4.7 cm/s    Left renal upper parenchyma max 17.6 cm/s    Left renal upper parenchyma min 5.9 cm/s    Left Interlobar PSV 42.5 cm/s    Left Interlobar EDV 10.6 cm/s    Left renal dist sys 70.8 cm/s    Left renal dist caretr 14.3 cm/s    Left Renal Dist RI 0.80     Left renal mid sys 70.6 cm/s    Left renal mid carter 12.1 cm/s    Left Renal Mid RI 0.83     Left renal prox sys 106.0 cm/s    Left renal prox carter 23.1 cm/s    Left Renal Prox RI 0.78     Left renal origin sys 184.4 cm/s    Left renal origin carter 18.5 cm/s    L renal orig RI 0.90     Abdominal prox aorta herminia 74.4 cm/s    Prox aortic AP 2.06 cm    Prox aortic trans 2.04 cm    Right kidney length 10.62 cm    Right kidney width 5.07 cm    Left kidney length 10.21 cm    Left kidney width 5.24 cm    Right renal origin rar 1.00     Right renal prox rar 0.85     Right renal mid rar 1.07     Right renal dist rar 1.03     Right renal upper parenchyma RI 0.71     Right renal middle parenchyma RI 0.66     Right renal lower parenchyma RI 0.71     Left renal origin rar 2.48     Left renal prox rar 1.42     Left renal mid rar 0.95     Left renal dist rar 0.95     Left renal upper parenchyma RI 0.66     Left renal middle parenchyma RI 0.77     Left renal lower parenchyma RI 0.56     Right Interlobar RI 0.7     Left Interlobar RI 0.8     Right Renal Origin RI 0.78        Signed By: Xavier Luis MD     September 14, 2022      I spent 25 minutes with the patient in face-to-face consultation, of which greater than 50% was spent in counseling and coordination of care as described above    Disclaimer: Sections of this note are dictated using utilizing voice recognition software. Minor typographical errors may be present. If questions arise, please do not hesitate to contact me or call our department.

## 2022-09-14 NOTE — PROGRESS NOTES
Problem: Falls - Risk of  Goal: *Absence of Falls  Description: Document Dylan Farrell Fall Risk and appropriate interventions in the flowsheet.   Outcome: Progressing Towards Goal  Note: Fall Risk Interventions:  Mobility Interventions: Bed/chair exit alarm         Medication Interventions: Bed/chair exit alarm, Patient to call before getting OOB, Teach patient to arise slowly    Elimination Interventions: Bed/chair exit alarm, Call light in reach              Problem: Patient Education: Go to Patient Education Activity  Goal: Patient/Family Education  Outcome: Progressing Towards Goal     Problem: Patient Education: Go to Patient Education Activity  Goal: Patient/Family Education  Outcome: Progressing Towards Goal

## 2022-09-14 NOTE — DIABETES MGMT
Diabetes/ Glycemic Control Plan of Care    Assessment:   Following for steroids and elevated BG. Steroids were discontinued this morning. She confirms she takes 15 units levemir daily in the AM and monitors BG 1-2 times daily  Reviewed her A1c of 7.2% and recent BG values r/t steroids. She is receiving basal, corrective and mealtime insulin. Note she received a total of 24 units of lantus yesterday   Lab  mg/dl this morning  Will continue to monitor    IV Fluids containing dextrose: none   Steroids:  completed this morning    Blood glucose values: Within target range (70-180mg/dL):  no    Current insulin orders:   Lantus 15 units daily  Corrective humalog, very insulin resistant, 4 times daily  Mealtime humalog 4 units  Total Daily Dose previous 24 hours = 55 units  24 units lantus  27 units corrective humalog  4 units mealtime humalog    Current A1c:   Lab Results   Component Value Date/Time    Hemoglobin A1c 7.2 (H) 06/29/2022 03:12 AM      equivalent  to ave Blood Glucose of 160 mg/dl for 2-3 months prior to admission  Adequate glycemic control PTA: no    Nutrition/Diet:   Active Orders   Diet    ADULT DIET Regular; 3 carb choices (45 gm/meal); No Salt Added (3-4 gm); No Concentrated Sweets      Home diabetes medications:    insulin detemir (LEVEMIR) 100 unit/mL injection 15 units daily for diabetes     Plan/Goals:   Blood glucose will be within target of 70 - 180 mg/dl within 72 hours  Reinforce dietary and medication compliance at home.           Education:  [] Refer to Diabetes Education Record                       [x] Education not indicated at this time     Alber Matamoros MPH RN [facility]  Office [phone]

## 2022-09-14 NOTE — PROGRESS NOTES
0730: Bedside and verbal shift report given to Gracia Rojas (oncoming nurse) by Chris Escobar RN (off going nurse). Report included the following information SBAR, Intake/Output, Mar and Cardiac rhythm. 747: Patient off floor with transport to get procedure done. 3654: Patient back to floor. 1530: Attempted to assist patient out of bed to chair. Patient refused saying that she is tired at the moment.

## 2022-09-14 NOTE — PROGRESS NOTES
Progress Note      29-year-old female with past medical history of hypertension, congestive heart failure, admitted for decompensated heart failure, following for renal failure  Subjective      Overnight event noted  Documented urine output about 550cc    IMPRESSION:   Acute on chronic kidney injury, rising creatinine and BUN in setting of overdiuresis  CKD stage III with proteinuria, baseline creatinine around 1.5 mg per DL, history of frequent CARA  Uremia, asymptomatic  Decompensated heart failure, diastolic heart failure, improving symptoms  Diabetes  Secondary hyperparathyroidism of CKD   PLAN:   She has multifactorial dyspnea, including volume overload and deconditioning. From volume stand point I would suggestive to hold diuretics for at least next 48hrs, improve her mobility and if renal function remain stable resume low dose diuretics. Avoid hypotension. Follow renal duplex study. Adjust medication per current renal function status. Discussed with cardiology colleague.      Facility-Administered Medications: None       Current Facility-Administered Medications   Medication Dose Route Frequency    dilTIAZem ER (CARDIZEM CD) capsule 180 mg  180 mg Oral DAILY    insulin glargine (LANTUS) injection 15 Units  15 Units SubCUTAneous ACL    [Held by provider] furosemide (LASIX) tablet 40 mg  40 mg Oral DAILY    cloNIDine HCL (CATAPRES) tablet 0.1 mg  0.1 mg Oral BID    insulin lispro (HUMALOG) injection 4 Units  4 Units SubCUTAneous TIDAC    arformoteroL (BROVANA) neb solution 15 mcg  15 mcg Nebulization BID RT    budesonide (PULMICORT) 500 mcg/2 ml nebulizer suspension  500 mcg Nebulization BID RT    methylPREDNISolone (PF) (SOLU-MEDROL) injection 40 mg  40 mg IntraVENous Q12H    albuterol-ipratropium (DUO-NEB) 2.5 MG-0.5 MG/3 ML  3 mL Nebulization Q4H PRN    isosorbide mononitrate ER (IMDUR) tablet 60 mg  60 mg Oral DAILY    hydrALAZINE (APRESOLINE) tablet 100 mg  100 mg Oral TID    aspirin chewable tablet 81 mg  81 mg Oral DAILY    atorvastatin (LIPITOR) tablet 40 mg  40 mg Oral QHS    gabapentin (NEURONTIN) capsule 400 mg  400 mg Oral DAILY    sodium chloride (NS) flush 5-40 mL  5-40 mL IntraVENous Q8H    sodium chloride (NS) flush 5-40 mL  5-40 mL IntraVENous PRN    acetaminophen (TYLENOL) tablet 650 mg  650 mg Oral Q6H PRN    Or    acetaminophen (TYLENOL) suppository 650 mg  650 mg Rectal Q6H PRN    polyethylene glycol (MIRALAX) packet 17 g  17 g Oral DAILY PRN    ondansetron (ZOFRAN ODT) tablet 4 mg  4 mg Oral Q8H PRN    Or    ondansetron (ZOFRAN) injection 4 mg  4 mg IntraVENous Q6H PRN    enoxaparin (LOVENOX) injection 40 mg  40 mg SubCUTAneous DAILY    insulin lispro (HUMALOG) injection   SubCUTAneous AC&HS    glucose chewable tablet 16 g  16 g Oral PRN    glucagon (GLUCAGEN) injection 1 mg  1 mg IntraMUSCular PRN    dextrose 10% infusion 0-250 mL  0-250 mL IntraVENous PRN    naloxone (NARCAN) injection 0.4 mg  0.4 mg IntraVENous EVERY 2 MINUTES AS NEEDED    oxyCODONE-acetaminophen (PERCOCET) 5-325 mg per tablet 1 Tablet  1 Tablet Oral Q4H PRN       Review of Systems:   As above . Data Review:    Labs: Results:       Chemistry Recent Labs     09/14/22  0524 09/13/22  0342 09/12/22  0509   * 221* 161*    137 138   K 5.5 5.4 4.8    106 107   CO2 22 22 22   BUN 80* 78* 80*   CREA 2.17* 2.28* 2.46*   CA 9.3 8.5 8.7   AGAP 9 9 9   BUCR 37* 34* 33*      CBC w/Diff No results for input(s): WBC, RBC, HGB, HCT, PLT, GRANS, LYMPH, EOS, HGBEXT, HCTEXT, PLTEXT, HGBEXT, HCTEXT, PLTEXT in the last 72 hours. Coagulation No results for input(s): PTP, INR, APTT, INREXT, INREXT in the last 72 hours. Iron/Ferritin No results for input(s): IRON in the last 72 hours. No lab exists for component: TIBCCALC   BNP No results for input(s): BNPP in the last 72 hours. Cardiac Enzymes No results for input(s): CPK, CKND1, GODFREY in the last 72 hours.     No lab exists for component: CKRMB, TROIP   Liver Enzymes No results for input(s): TP, ALB, TBIL, AP in the last 72 hours. No lab exists for component: SGOT, GPT, DBIL   Thyroid Studies Lab Results   Component Value Date/Time    TSH 0.21 (L) 03/19/2021 04:00 AM         EKG: normal EKG, normal sinus rhythm.     Physical Assessment:   Visit Vitals  BP 90/64   Pulse 86   Temp 97.8 °F (36.6 °C)   Resp 18   Ht 5' 4\" (1.626 m)   Wt 104 kg (229 lb 4.8 oz)   SpO2 96%   BMI 39.36 kg/m²     Weight change:     Intake/Output Summary (Last 24 hours) at 9/14/2022 1315  Last data filed at 9/14/2022 1055  Gross per 24 hour   Intake 720 ml   Output 550 ml   Net 170 ml     Physical Exam:   General: comfortable, no acute distress   HEENT sclera anicteric, supple neck, no thyromegaly  CVS: S1S2 heard,  no rub  RS: + air entry b/l,   Abd: Soft, Non tender, Not distended, Positive bowel sounds, no organomegaly, no CVA / supra pubic tenderness  Neuro: non focal, awake, alert , CN II-XII are grossly intact  Extrm: + edema, no cyanosis, clubbing   Skin: no visible  Rash  Musculoskeletal: No gross joints or bone deformities     Procedures/imaging: see electronic medical records for all procedures, Xrays and details which were not copied into this note but were reviewed prior to creation of Joi Ziegler MD  September 14, 2022  [facility]  Office [phone]

## 2022-09-14 NOTE — PROGRESS NOTES
Patient placed on V60 Bipap with small FFM. Tolerating well.  Will monitor closely     09/13/22 2327   Oxygen Therapy   O2 Sat (%) 96 %   Pulse via Oximetry 88 beats per minute   O2 Device BIPAP   Skin Assessment Clean, dry, & intact   FIO2 (%) 30 %   Respiratory   Respiratory Pattern Regular   Chest/Tracheal Assessment Chest expansion, symmetrical   Breath Sounds Bilateral Diminished   Cough Non-productive   CPAP/BIPAP   CPAP/BIPAP Start/Stop On   Device Mode AVAP;BIPAP;S/T   $$ Bipap Daily Yes   Mask Type and Size Full face;Small   Skin Condition intact   PIP Observed 13 cm H20   IPAP (cm H2O) 13 cm H2O   EPAP (cm H2O) 4 cm H2O   Inspiratory Time (sec) 1 seconds   Vt Spont (ml) 498 ml   Ve Observed (l/min) 8.9 l/min   Backup Rate 8   Total RR (Spontaneous) 15 breaths per minute   Leak (Estimated) 32 L/min   Pt's Home Machine No   Biomedical Check Performed Yes   Settings Verified Yes   Alarm Settings   High Pressure 40   Low Pressure 8   Low Ve 3   High Rate 50   Low Rate 5   Age Specific Ventilator Associated Pneumonia Bundle   Patient Age Group Adult   Adult Ventilator Associated Pneumonia Bundle   Elevation of Head to 30-45 Degrees (Unless Contraindicated) Yes

## 2022-09-15 LAB
ANION GAP SERPL CALC-SCNC: 7 MMOL/L (ref 3–18)
BACTERIA SPEC CULT: NORMAL
BACTERIA SPEC CULT: NORMAL
BUN SERPL-MCNC: 93 MG/DL (ref 7–18)
BUN/CREAT SERPL: 39 (ref 12–20)
CALCIUM SERPL-MCNC: 9.3 MG/DL (ref 8.5–10.1)
CHLORIDE SERPL-SCNC: 106 MMOL/L (ref 100–111)
CO2 SERPL-SCNC: 22 MMOL/L (ref 21–32)
CREAT SERPL-MCNC: 2.39 MG/DL (ref 0.6–1.3)
GLUCOSE BLD STRIP.AUTO-MCNC: 171 MG/DL (ref 70–110)
GLUCOSE BLD STRIP.AUTO-MCNC: 198 MG/DL (ref 70–110)
GLUCOSE BLD STRIP.AUTO-MCNC: 200 MG/DL (ref 70–110)
GLUCOSE BLD STRIP.AUTO-MCNC: 214 MG/DL (ref 70–110)
GLUCOSE BLD STRIP.AUTO-MCNC: 234 MG/DL (ref 70–110)
GLUCOSE SERPL-MCNC: 167 MG/DL (ref 74–99)
POTASSIUM SERPL-SCNC: 5.9 MMOL/L (ref 3.5–5.5)
SERVICE CMNT-IMP: NORMAL
SERVICE CMNT-IMP: NORMAL
SODIUM SERPL-SCNC: 135 MMOL/L (ref 136–145)

## 2022-09-15 PROCEDURE — 74011250637 HC RX REV CODE- 250/637: Performed by: INTERNAL MEDICINE

## 2022-09-15 PROCEDURE — 94640 AIRWAY INHALATION TREATMENT: CPT

## 2022-09-15 PROCEDURE — 99232 SBSQ HOSP IP/OBS MODERATE 35: CPT | Performed by: INTERNAL MEDICINE

## 2022-09-15 PROCEDURE — 2709999900 HC NON-CHARGEABLE SUPPLY

## 2022-09-15 PROCEDURE — 36415 COLL VENOUS BLD VENIPUNCTURE: CPT

## 2022-09-15 PROCEDURE — 74011250637 HC RX REV CODE- 250/637: Performed by: PHYSICIAN ASSISTANT

## 2022-09-15 PROCEDURE — 74011636637 HC RX REV CODE- 636/637: Performed by: HOSPITALIST

## 2022-09-15 PROCEDURE — 94762 N-INVAS EAR/PLS OXIMTRY CONT: CPT

## 2022-09-15 PROCEDURE — 80048 BASIC METABOLIC PNL TOTAL CA: CPT

## 2022-09-15 PROCEDURE — 97116 GAIT TRAINING THERAPY: CPT

## 2022-09-15 PROCEDURE — 94660 CPAP INITIATION&MGMT: CPT

## 2022-09-15 PROCEDURE — 82962 GLUCOSE BLOOD TEST: CPT

## 2022-09-15 PROCEDURE — 74011000250 HC RX REV CODE- 250: Performed by: INTERNAL MEDICINE

## 2022-09-15 PROCEDURE — 65270000046 HC RM TELEMETRY

## 2022-09-15 PROCEDURE — 99232 SBSQ HOSP IP/OBS MODERATE 35: CPT | Performed by: HOSPITALIST

## 2022-09-15 RX ORDER — DILTIAZEM HYDROCHLORIDE 120 MG/1
120 CAPSULE, COATED, EXTENDED RELEASE ORAL DAILY
Status: DISCONTINUED | OUTPATIENT
Start: 2022-09-16 | End: 2022-09-22

## 2022-09-15 RX ORDER — HYDRALAZINE HYDROCHLORIDE 25 MG/1
25 TABLET, FILM COATED ORAL 3 TIMES DAILY
Status: DISCONTINUED | OUTPATIENT
Start: 2022-09-15 | End: 2022-09-16

## 2022-09-15 RX ORDER — HEPARIN SODIUM 5000 [USP'U]/ML
5000 INJECTION, SOLUTION INTRAVENOUS; SUBCUTANEOUS EVERY 8 HOURS
Status: DISPENSED | OUTPATIENT
Start: 2022-09-16 | End: 2022-09-18

## 2022-09-15 RX ORDER — INSULIN GLARGINE 100 [IU]/ML
20 INJECTION, SOLUTION SUBCUTANEOUS
Status: DISCONTINUED | OUTPATIENT
Start: 2022-09-15 | End: 2022-09-16

## 2022-09-15 RX ADMIN — HYDRALAZINE HYDROCHLORIDE 25 MG: 25 TABLET ORAL at 17:52

## 2022-09-15 RX ADMIN — DILTIAZEM HYDROCHLORIDE 180 MG: 180 CAPSULE, COATED, EXTENDED RELEASE ORAL at 08:07

## 2022-09-15 RX ADMIN — SODIUM CHLORIDE, PRESERVATIVE FREE 10 ML: 5 INJECTION INTRAVENOUS at 05:49

## 2022-09-15 RX ADMIN — Medication 4 UNITS: at 08:05

## 2022-09-15 RX ADMIN — Medication 3 UNITS: at 17:53

## 2022-09-15 RX ADMIN — HYDRALAZINE HYDROCHLORIDE 25 MG: 25 TABLET ORAL at 21:53

## 2022-09-15 RX ADMIN — BUDESONIDE 500 MCG: 0.5 SUSPENSION RESPIRATORY (INHALATION) at 11:47

## 2022-09-15 RX ADMIN — Medication 4 UNITS: at 17:53

## 2022-09-15 RX ADMIN — ATORVASTATIN CALCIUM 40 MG: 40 TABLET, FILM COATED ORAL at 21:53

## 2022-09-15 RX ADMIN — ISOSORBIDE MONONITRATE 30 MG: 30 TABLET, EXTENDED RELEASE ORAL at 08:10

## 2022-09-15 RX ADMIN — HYDRALAZINE HYDROCHLORIDE 50 MG: 50 TABLET, FILM COATED ORAL at 08:07

## 2022-09-15 RX ADMIN — SODIUM CHLORIDE, PRESERVATIVE FREE 10 ML: 5 INJECTION INTRAVENOUS at 05:51

## 2022-09-15 RX ADMIN — GABAPENTIN 400 MG: 400 CAPSULE ORAL at 08:07

## 2022-09-15 RX ADMIN — Medication 6 UNITS: at 21:52

## 2022-09-15 RX ADMIN — OXYCODONE HYDROCHLORIDE AND ACETAMINOPHEN 1 TABLET: 5; 325 TABLET ORAL at 08:10

## 2022-09-15 RX ADMIN — OXYCODONE HYDROCHLORIDE AND ACETAMINOPHEN 1 TABLET: 5; 325 TABLET ORAL at 17:55

## 2022-09-15 RX ADMIN — ASPIRIN 81 MG CHEWABLE TABLET 81 MG: 81 TABLET CHEWABLE at 08:07

## 2022-09-15 RX ADMIN — OXYCODONE HYDROCHLORIDE AND ACETAMINOPHEN 1 TABLET: 5; 325 TABLET ORAL at 21:58

## 2022-09-15 RX ADMIN — Medication 3 UNITS: at 11:47

## 2022-09-15 RX ADMIN — Medication 6 UNITS: at 08:05

## 2022-09-15 RX ADMIN — ARFORMOTEROL TARTRATE 15 MCG: 15 SOLUTION RESPIRATORY (INHALATION) at 08:00

## 2022-09-15 RX ADMIN — Medication 4 UNITS: at 11:48

## 2022-09-15 RX ADMIN — CLONIDINE HYDROCHLORIDE 0.1 MG: 0.1 TABLET ORAL at 08:07

## 2022-09-15 RX ADMIN — Medication 20 UNITS: at 11:48

## 2022-09-15 RX ADMIN — SODIUM ZIRCONIUM CYCLOSILICATE 5 G: 5 POWDER, FOR SUSPENSION ORAL at 09:25

## 2022-09-15 RX ADMIN — SODIUM CHLORIDE, PRESERVATIVE FREE 10 ML: 5 INJECTION INTRAVENOUS at 15:05

## 2022-09-15 RX ADMIN — SODIUM CHLORIDE, PRESERVATIVE FREE 10 ML: 5 INJECTION INTRAVENOUS at 21:53

## 2022-09-15 NOTE — PROGRESS NOTES
Problem: Mobility Impaired (Adult and Pediatric)  Goal: *Acute Goals and Plan of Care (Insert Text)  Description: Physical Therapy Goals  Initiated 9/13/2022 and to be accomplished within 7 day(s)  1. Patient will move from supine to sit and sit to supine  in bed with modified independence. 2.  Patient will transfer from bed to chair and chair to bed with modified independence using the least restrictive device. 3.  Patient will perform sit to stand with modified independence. 4.  Patient will ambulate with modified independence for 100 feet with the least restrictive device. 5.  Patient will ascend/descend 4 stairs with  handrail(s) with supervision/set-up. PLOF: Patient lives with spouse in single story home with 4 JUSTICE. She was independent without AD. She has rollator at home. Outcome: Progressing Towards Goal     PHYSICAL THERAPY TREATMENT    Patient: Franc Miller (21 y.o. female)  Date: 9/15/2022  Diagnosis: Acute respiratory failure with hypoxia ([facility]) [J96.01]  CHF (congestive heart failure) ([facility] [address] [I50.9] <principal problem not specified>      Precautions: Fall    ASSESSMENT:  RN cleared for mobility. Pt found semi-reclined in bed in NAD, willing to work with PT. Sup-sit with SBA. Pt stood and amb out in hallway and back with slow but steady gait. Increased dyspnea near end of amb, Spo2 at 96%. Edu pt on PLB technique. She declined getting into recliner at this time and returned back to bed. Edu pt on getting to chair for lunch. Progression toward goals:   []      Improving appropriately and progressing toward goals  [x]      Improving slowly and progressing toward goals  []      Not making progress toward goals and plan of care will be adjusted     PLAN:  Patient continues to benefit from skilled intervention to address the above impairments. Continue treatment per established plan of care.     Further Equipment Recommendations for Discharge:  N/A    AMPAC: Based on an AM-PAC score of 20/24 (or **/20 if omitting stairs) and their current functional mobility deficits, it is recommended that the patient have 2-3 sessions per week of Physical Therapy at d/c to increase the patient's independence. This AMPAC score should be considered in conjunction with interdisciplinary team recommendations to determine the most appropriate discharge setting. Patient's social support, diagnosis, medical stability, and prior level of function should also be taken into consideration. SUBJECTIVE:   Patient stated My  is there.     OBJECTIVE DATA SUMMARY:   Critical Behavior:  Neurologic State: Alert  Orientation Level: Oriented X4  Cognition: Follows commands  Safety/Judgement: Fall prevention  Functional Mobility Training:  Bed Mobility:     Supine to Sit: Supervision  Sit to Supine: Supervision            Transfers:  Sit to Stand: Stand-by assistance  Stand to Sit: Stand-by assistance        Balance:  Sitting: Intact; With support  Standing: Impaired; With support  Standing - Static: Good  Standing - Dynamic : Fair   Ambulation/Gait Training:  Distance (ft): 40 Feet (ft)  Assistive Device: Walker, rolling  Ambulation - Level of Assistance: Stand-by assistance        Gait Abnormalities: Decreased step clearance      Therapeutic Exercises:   Pt declined      Pain:  Pain level pre-treatment: 0/10  Pain level post-treatment: 0/10   Pain Intervention(s): Medication (see MAR); Rest, Ice, Repositioning   Response to intervention: Nurse notified, See doc flow    Activity Tolerance:   Pt tolerated mobility well  Please refer to the flowsheet for vital signs taken during this treatment.   After treatment:   [] Patient left in no apparent distress sitting up in chair  [x] Patient left in no apparent distress in bed  [x] Call bell left within reach  [x] Nursing notified  [] Caregiver present  [] Bed alarm activated  [] SCDs applied      COMMUNICATION/EDUCATION:   [x]         Role of Physical Therapy in the acute care setting. [x]         Fall prevention education was provided and the patient/caregiver indicated understanding. [x]         Patient/family have participated as able in working toward goals and plan of care. []         Patient/family agree to work toward stated goals and plan of care. []         Patient understands intent and goals of therapy, but is neutral about his/her participation. []         Patient is unable to participate in stated goals/plan of care: ongoing with therapy staff.  []         Other:        Bladimir Hernandez   Time Calculation: 11 mins    [employer] AM-PAC® Basic Mobility Inpatient Short Form (6-Clicks) Version 2    How much HELP from another person does the patient currently need    (If the patient hasn't done an activity recently, how much help from another person do you think he/she would need if he/she tried?)   Total (Total A or Dep)   A Lot  (Mod to Max A)   A Little (Sup or Min A)   None (Mod I to I)   Turning from your back to your side while in a flat bed without using bedrails? [] 1 [] 2 [] 3 [x] 4   2. Moving from lying on your back to sitting on the side of a flat bed without using bedrails? [] 1 [] 2 [] 3 [x] 4   3. Moving to and from a bed to a chair (including a wheelchair)? [] 1 [] 2 [x] 3 [] 4   4. Standing up from a chair using your arms (e.g., wheelchair, or bedside chair)? [] 1 [] 2 [x] 3 [] 4   5. Walking in hospital room? [] 1 [] 2 [x] 3 [] 4   6. Climbing 3-5 steps with a railing?+   [] 1 [] 2 [x] 3 [] 4   +If stair climbing cannot be assessed, skip item #6. Sum responses from items 1-5. Based on an AM-PAC score of 20/24 (or **/20 if omitting stairs) and their current functional mobility deficits, it is recommended that the patient have 2-3 sessions per week of Physical Therapy at d/c to increase the patient's independence.

## 2022-09-15 NOTE — PROGRESS NOTES
Bedside and Verbal shift change report given to Darwin Dan RN (oncoming nurse) by Akhil Slade RN (offgoing nurse). Report included the following information SBAR, Kardex, ED Summary, Intake/Output, MAR, Accordion, Recent Results, Med Rec Status, and Cardiac Rhythm NSR . Wound Prevention Checklist    Patient: Eligio Haq (45 y.o. female)  Date: 9/15/2022  Diagnosis: Acute respiratory failure with hypoxia ([facility]) [J96.01]  CHF (congestive heart failure) ([facility] [address] [I50.9] <principal problem not specified>    Precautions: Fall       []  Heel prevention boots placed on patient    []  Patient turned q2h during shift    []  Lift team ordered    [x]  Patient on [place] bed/Specialty bed    []  Each Wound is documented during shift (Stage, Color, drainage, odor, measurements, and dressings)    [x]  Dual skin checks done at bedside during shift report with Akhil Slade, [address] Álvarez RN      5041: Joslyn Gomez, denies pain or sob, on 2L O2 nc, VSS, shift assessment completed, bed locked and low position, call bell within reach    2158: Due medication given,  - Humalog 6 units sc given per protocol, Percocet 1 tab given for pain    0013: Reassessment completed, VSS, Assisted pt to bedside commode and back to bed comfortably, call bell within reach    0219: Sleeping, no distress noted    0421: Pt sleeping, VSS, no distress noted, no change from previous assessment    0551: Sleeping, call bell within reach    Bedside and Verbal shift change report given to Callie Mcguire RN (oncoming nurse) by Darwin Dan RN (offgoing nurse). Report included the following information SBAR, Kardex, ED Summary, Intake/Output, MAR, Recent Results, Med Rec Status, and Cardiac Rhythm NSR .

## 2022-09-15 NOTE — PROGRESS NOTES
Bedside shift change report given to Trjoe Burris (oncoming nurse) by Inder Lomeli (offgoing nurse). Report included the following information SBAR, Procedure Summary, Intake/Output, MAR, Recent Results, and Cardiac Rhythm NSR .      Wound Prevention Checklist    Patient: Charlie Castillo (85 y.o. female)  Date: 9/15/2022  Diagnosis: Acute respiratory failure with hypoxia ([facility]) [J96.01]  CHF (congestive heart failure) ([facility] [address] [I50.9] <principal problem not specified>    Precautions: Fall       []  Heel prevention boots placed on patient    []  Patient turned q2h during shift    []  Lift team ordered    [x]  Patient on [place] bed/Specialty bed    []  Each Wound is documented during shift (Stage, Color, drainage, odor, measurements, and dressings)    [x]  Dual skin checks done at bedside during shift report with Bobby Moise, RN

## 2022-09-15 NOTE — PROGRESS NOTES
[facility]  Progress Note    Patient: Oswaldo Rodriguez Age: 81 y.o. : 1950 MR#: [id number] SSN: [id number]  Date/Time: 9/15/2022     Subjective:     82-year-old female with a history of chronic diastolic CHF, COPD presents to the hospital secondary to shortness of breath. Patient noted to have elevated blood pressure in the emergency room, started on nitro gtt. Patient admitted to stepdown unit for further evaluation. Creatinine increased to 2.39. BUN increased to 93. K 5.9. Hydralazine decreased per cards. Continued on cardizem and clonidine. Patient states her back pain is about the same. Still gets winded just sitting up on edge of bed and with minimal exertion. No chest pain. Appetite okay. Mouth feels \"sticky,\" she also reports having a funny taste in her mouth as well, not metallic. She reports mental fog, and fatigue. No family at bedside. Assessment/Plan:     Hypertensive emergency-blood pressure improving, off nitro gtt. , continue hydralazine, Coreg, amlodipine, catapres. Avoid ACE ARB given CARA. Diuretic held at this time. Acute on chronic diastolic CHF exacerbation. Diuretics held. DM2 with steroid-induced hyperglycemia. Increase Lantus. Premeal insulin. SSI. Diabetes management follows. Mild to moderate COPD with ? exacerbation. Stop steroids and monitor. Bronchodilators. RT for bronchial hygiene protocol. Pulmonology input appreciated. Obesity Body mass index is 39.86 kg/m². CARA on ckd3. Monitor off Lasix at this time. Nephrology follows. Right basilar infiltrate likely asymmetric edema. Postmenopausal vaginal bleeding. Close outpatient follow-up with OB/GYN. 9. PFT in 2017 consistent with restrictive lung disease  10. Former tobacco.  6. Prior levi diagnosis. Bipap qHS and prn. Repeat sleep studies w/ Dr. Clarence Srivastava. 12. Patient meets criteria for low dose CT screen. 13. Suprapubic pain. UA negative.    14. Hyperkalemia. Manoj Hernandez. DVT prophylaxis-Lovenox  Full code. Transfer telemetry. Hh ordered. Patient did not qualify for home oxygen 2022. Discussed on IDR. Treva Conley MD  09/15/22      Case discussed with:  [x]Patient  []Family  [x]Nursing  [x]Case Management  DVT Prophylaxis:  [x]Lovenox  []Hep SQ  []SCDs  []Coumadin   []On Heparin gtt    Objective:   VS: Visit Vitals  /71   Pulse 65   Temp 98 °F (36.7 °C)   Resp 22   Ht 5' 4\" (1.626 m)   Wt 105.3 kg (232 lb 3.2 oz)   SpO2 96%   BMI 39.86 kg/m²        Tmax/24hrs: Temp (24hrs), Av.2 °F (36.8 °C), Min:97.6 °F (36.4 °C), Max:98.7 °F (37.1 °C)  IOBRIEF  Intake/Output Summary (Last 24 hours) at 9/15/2022 1110  Last data filed at 9/15/2022 0959  Gross per 24 hour   Intake 600 ml   Output --   Net 600 ml       General:  Alert, awake oriented x4. Speaking in full sentences on supplemental oxygen. HEENT normocephalic atraumatic pupils equally round reactive to light oropharynx clear  Pulmonary: improved air entry. No wheeze. No crackles. Cardiovascular: Regular rate and Rhythm. GI:  Soft, Non distended, Non tender. Normoactive bowel sounds. Extremities:  No edema, cyanosis, clubbing. No calf tenderness. Neurologic: Alert and oriented X 4. No acute neuro deficits. Additional: No rash to visible skin.     Medications:   Current Facility-Administered Medications   Medication Dose Route Frequency    sodium zirconium cyclosilicate (LOKELMA) powder packet 5 g  5 g Oral DAILY    hydrALAZINE (APRESOLINE) tablet 25 mg  25 mg Oral TID    [START ON 2022] dilTIAZem ER (CARDIZEM CD) capsule 120 mg  120 mg Oral DAILY    insulin glargine (LANTUS) injection 15 Units  15 Units SubCUTAneous ACL    [Held by provider] furosemide (LASIX) tablet 40 mg  40 mg Oral DAILY    cloNIDine HCL (CATAPRES) tablet 0.1 mg  0.1 mg Oral BID    insulin lispro (HUMALOG) injection 4 Units  4 Units SubCUTAneous TIDAC    arformoteroL (BROVANA) neb solution 15 mcg  15 mcg Nebulization BID RT    budesonide (PULMICORT) 500 mcg/2 ml nebulizer suspension  500 mcg Nebulization BID RT    albuterol-ipratropium (DUO-NEB) 2.5 MG-0.5 MG/3 ML  3 mL Nebulization Q4H PRN    aspirin chewable tablet 81 mg  81 mg Oral DAILY    atorvastatin (LIPITOR) tablet 40 mg  40 mg Oral QHS    gabapentin (NEURONTIN) capsule 400 mg  400 mg Oral DAILY    sodium chloride (NS) flush 5-40 mL  5-40 mL IntraVENous Q8H    sodium chloride (NS) flush 5-40 mL  5-40 mL IntraVENous PRN    acetaminophen (TYLENOL) tablet 650 mg  650 mg Oral Q6H PRN    Or    acetaminophen (TYLENOL) suppository 650 mg  650 mg Rectal Q6H PRN    polyethylene glycol (MIRALAX) packet 17 g  17 g Oral DAILY PRN    ondansetron (ZOFRAN ODT) tablet 4 mg  4 mg Oral Q8H PRN    Or    ondansetron (ZOFRAN) injection 4 mg  4 mg IntraVENous Q6H PRN    enoxaparin (LOVENOX) injection 40 mg  40 mg SubCUTAneous DAILY    insulin lispro (HUMALOG) injection   SubCUTAneous AC&HS    glucose chewable tablet 16 g  16 g Oral PRN    glucagon (GLUCAGEN) injection 1 mg  1 mg IntraMUSCular PRN    dextrose 10% infusion 0-250 mL  0-250 mL IntraVENous PRN    naloxone (NARCAN) injection 0.4 mg  0.4 mg IntraVENous EVERY 2 MINUTES AS NEEDED    oxyCODONE-acetaminophen (PERCOCET) 5-325 mg per tablet 1 Tablet  1 Tablet Oral Q4H PRN       Imaging:   XR Results (most recent):  Results from Hospital Encounter encounter on 09/09/22    XR CHEST PORT    Narrative  EXAM: XR CHEST PORT    CLINICAL INDICATION/HISTORY: 81 years Female. shortness of breath. thanks. Additional History: None    TECHNIQUE: Frontal view of the chest    COMPARISON: 9/9/2022    FINDINGS:    Multiple devices project over the patient. The cardiac silhouette appears within  normal limits. Mild pulmonary vascular congestion, unchanged. Aortic  calcifications noted. Improved but incompletely resolved interstitial opacities at the right lung  base. No definite evidence of pleural effusion or pneumothorax.     No acute osseous abnormality appreciated. Right-sided subacromial spur. Impression  1. Improved but incompletely resolved interstitial opacities at the right lung  base, which may reflect atelectasis, pneumonia, or edema. 2.  Unchanged mild pulmonary vascular congestion. CT Results (most recent):  Results from Hospital Encounter encounter on 08/08/22    CTA CHEST W OR W WO CONT    Narrative  CT CHEST PULMONARY EMBOLISM PROTOCOL    CPT code: 91855    INDICATION: Dyspnea. Covid positive. Question pulmonary embolism. TECHNIQUE: 1.8YY collimation helical images obtained through the level of the  pulmonary arteries with additional imaging through the chest following the  uneventful administration of 72 cc's nonionic intravenous contrast.  - Images reconstructed into three dimensional coronal and sagittal projections  for complete evaluation of the tortuous and overlapping pulmonary vascular  structures and to reduce patient radiation dose. All CT scans at this facility are performed using dose optimization technique as  appropriate to this specific exam, to include automated exposure control,  adjustment of the mA and/or KP according to patient size or use of iterative  reconstruction techniques. COMPARISON: Prior CT 7/8/2022    FINDINGS:  Opacification of the pulmonary arteries is adequate. No filling defects are appreciated within the main, left, right, lobar or  visualized segmental pulmonary arteries to suggest embolism. Main pulmonary artery is enlarged, measuring up to 3.8 cm diameter, similar to  the prior exam. Findings can be seen in setting of pulmonary artery  hypertension. Heart size top normal. No pericardial effusion. Nonenlarged aorta. Atherosclerotic calcification in the arch and at level of  proximal arch branch vessels. Geographic areas of mildly increased groundglass attenuation in the right upper  lobe fairly diffuse, without consolidation.  Less extensive changes in the  superior aspect of the right middle lobe. Similar findings in the superior  segment right lower lobe and ventral to central right lower lobe. -Dependent peripheral atelectasis/consolidation subsegmental right lower lobe  adjacent to a small effusion. Similar appearing subsegmental dependent atelectasis left lower lobe adjacent to  a trace effusion. Mild degree atherosclerotic narrowing of the proximal SMA. Impression  1. No CT evidence for central pulmonary embolism.  -Similar enlargement of central pulmonary arteries suggest pulmonary artery  hypertension. 2. Multifocal geographic and peribronchial groundglass airspace infiltrates more  significant in the right lung than left. Imaging appearance is not specifically  characteristic of Covid pneumonia and could reflect viral inflammation or less  likely developing asymmetric pulmonary edema. 3. Multisegmental dependent atelectasis/consolidation right lower lobe adjacent  to a small effusion.  -Similar but less extensive subsegmental atelectasis adjacent to trace left  effusion. 06/25/22    ECHO ADULT COMPLETE 06/29/2022 6/29/2022    Interpretation Summary  Formatting of this result is different from the original.      Left Ventricle: Mildly reduced left ventricular systolic function with a visually estimated EF of 50 - 55%. Left ventricle is mildly dilated. Increased wall thickness. Findings consistent with mild concentric hypertrophy. See diagram for wall motion findings. Diastolic dysfunction present with normal LV EF. Mitral Valve: Mildly thickened leaflet. Mild regurgitation with a centrally directed jet. Signed by: Gabe Mendes MD on 6/29/2022 12:12 PM       MRI Results (most recent):  No results found for this or any previous visit.         Labs:    Recent Results (from the past 48 hour(s))   GLUCOSE, POC    Collection Time: 09/13/22  3:45 PM   Result Value Ref Range    Glucose (POC) 233 (H) 70 - 110 mg/dL   URINALYSIS W/MICROSCOPIC Collection Time: 09/13/22  4:17 PM   Result Value Ref Range    Color YELLOW      Appearance CLEAR      Specific gravity 1.011 1.005 - 1.030      pH (UA) 5.0 5.0 - 8.0      Protein Negative NEG mg/dL    Glucose Negative NEG mg/dL    Ketone Negative NEG mg/dL    Bilirubin Negative NEG      Blood Negative NEG      Urobilinogen 0.2 0.2 - 1.0 EU/dL    Nitrites Negative NEG      Leukocyte Esterase Negative NEG      WBC 0 to 1 0 - 4 /hpf    RBC 0 to 1 0 - 5 /hpf    Epithelial cells FEW 0 - 5 /lpf    Bacteria Negative NEG /hpf   GLUCOSE, POC    Collection Time: 09/13/22  9:01 PM   Result Value Ref Range    Glucose (POC) 218 (H) 70 - 971 mg/dL   METABOLIC PANEL, BASIC    Collection Time: 09/14/22  5:24 AM   Result Value Ref Range    Sodium 137 136 - 145 mmol/L    Potassium 5.5 3.5 - 5.5 mmol/L    Chloride 106 100 - 111 mmol/L    CO2 22 21 - 32 mmol/L    Anion gap 9 3.0 - 18 mmol/L    Glucose 202 (H) 74 - 99 mg/dL    BUN 80 (H) 7.0 - 18 MG/DL    Creatinine 2.17 (H) 0.6 - 1.3 MG/DL    BUN/Creatinine ratio 37 (H) 12 - 20      GFR est AA 27 (L) >60 ml/min/1.73m2    GFR est non-AA 22 (L) >60 ml/min/1.73m2    Calcium 9.3 8.5 - 10.1 MG/DL   GLUCOSE, POC    Collection Time: 09/14/22  7:21 AM   Result Value Ref Range    Glucose (POC) 206 (H) 70 - 110 mg/dL   DUPLEX RENAL ART/AMISH BILATERAL    Collection Time: 09/14/22  8:30 AM   Result Value Ref Range    Right renal lower parenchyma max 19.0 cm/s    Right renal lower parenchyma min 5.5 cm/s    Right renal middle parenchyma max 15.1 cm/s    Right renal middle parenchyma min 5.1 cm/s    Right renal upper parenchyma max 19.0 cm/s    Right renal upper parenchyma min 5.5 cm/s    Right Interlobar PSV 46.8 cm/s    Right Interlobar EDV 12.8 cm/s    Right renal dist sys 76.9 cm/s    Right renal dist carter 14.3 cm/s    Right Renal Dist RI 0.81     Right renal mid sys 79.3 cm/s    Right renal mid carter 14.3 cm/s    Right Renal Mid RI 0.82     Right renal prox sys 63.3 cm/s    Right renal prox carter 10.3 cm/s    Right Renal Prox RI 0.84     Right renal origin sys 74.4 cm/s    Right renal origin carter 16.7 cm/s    Left renal lower parenchyma max 16.3 cm/s    Left renal lower parenchyma min 7.1 cm/s    Left renal middle parenchyma max 20.0 cm/s    Left renal middle parenchyma min 4.7 cm/s    Left renal upper parenchyma max 17.6 cm/s    Left renal upper parenchyma min 5.9 cm/s    Left Interlobar PSV 42.5 cm/s    Left Interlobar EDV 10.6 cm/s    Left renal dist sys 70.8 cm/s    Left renal dist carter 14.3 cm/s    Left Renal Dist RI 0.80     Left renal mid sys 70.6 cm/s    Left renal mid carter 12.1 cm/s    Left Renal Mid RI 0.83     Left renal prox sys 106.0 cm/s    Left renal prox carter 23.1 cm/s    Left Renal Prox RI 0.78     Left renal origin sys 184.4 cm/s    Left renal origin carter 18.5 cm/s    L renal orig RI 0.90     Abdominal prox aorta herminia 74.4 cm/s    Prox aortic AP 2.06 cm    Prox aortic trans 2.04 cm    Right kidney length 10.62 cm    Right kidney width 5.07 cm    Left kidney length 10.21 cm    Left kidney width 5.24 cm    Right renal origin rar 1.00     Right renal prox rar 0.85     Right renal mid rar 1.07     Right renal dist rar 1.03     Right renal upper parenchyma RI 0.71     Right renal middle parenchyma RI 0.66     Right renal lower parenchyma RI 0.71     Left renal origin rar 2.48     Left renal prox rar 1.42     Left renal mid rar 0.95     Left renal dist rar 0.95     Left renal upper parenchyma RI 0.66     Left renal middle parenchyma RI 0.77     Left renal lower parenchyma RI 0.56     Right Interlobar RI 0.7     Left Interlobar RI 0.8     Right Renal Origin RI 0.78    GLUCOSE, POC    Collection Time: 09/14/22 10:52 AM   Result Value Ref Range    Glucose (POC) 291 (H) 70 - 110 mg/dL   GLUCOSE, POC    Collection Time: 09/14/22  3:51 PM   Result Value Ref Range    Glucose (POC) 262 (H) 70 - 110 mg/dL   GLUCOSE, POC    Collection Time: 09/14/22  9:15 PM   Result Value Ref Range    Glucose (POC) 279 (H) 70 - 070 mg/dL   METABOLIC PANEL, BASIC    Collection Time: 09/15/22  5:02 AM   Result Value Ref Range    Sodium 135 (L) 136 - 145 mmol/L    Potassium 5.9 (H) 3.5 - 5.5 mmol/L    Chloride 106 100 - 111 mmol/L    CO2 22 21 - 32 mmol/L    Anion gap 7 3.0 - 18 mmol/L    Glucose 167 (H) 74 - 99 mg/dL    BUN 93 (H) 7.0 - 18 MG/DL    Creatinine 2.39 (H) 0.6 - 1.3 MG/DL    BUN/Creatinine ratio 39 (H) 12 - 20      GFR est AA 24 (L) >60 ml/min/1.73m2    GFR est non-AA 20 (L) >60 ml/min/1.73m2    Calcium 9.3 8.5 - 10.1 MG/DL   GLUCOSE, POC    Collection Time: 09/15/22  7:25 AM   Result Value Ref Range    Glucose (POC) 200 (H) 70 - 110 mg/dL   GLUCOSE, POC    Collection Time: 09/15/22 10:52 AM   Result Value Ref Range    Glucose (POC) 234 (H) 70 - 110 mg/dL       Signed By: Angeline Rosales MD     September 15, 2022      I spent 25 minutes with the patient in face-to-face consultation, of which greater than 50% was spent in counseling and coordination of care as described above    Disclaimer: Sections of this note are dictated using utilizing voice recognition software. Minor typographical errors may be present. If questions arise, please do not hesitate to contact me or call our department.

## 2022-09-15 NOTE — PROGRESS NOTES
Cardiology Progress Note    Admit Date: 9/9/2022  Attending Cardiologist: Dr. Jevon Bullard:     -Acute hypoxic respiratory failure in setting of below, s/p bipap. Improved. -Flash pulmonary edema in setting of severely elevated blood pressures.   -Chest pain, atypical with pleuritic component. MI r/o with serial negative cardiac enzymes. No acute ischemic changes on ECG. -Hypertensive crisis, Max /121 mmHg. Improved. -a/c HFpEF. Last Echo in June 2022 with normal LVEF. Renal duplex with Elevated velocities noted within the left renal artery origin. -CAD, s/p prox LAD PCI/YADIRA (2018); cardiac cath 10/2019 with findings as follows. LAD: Previously placed ostial to proximal LAD stent is widely patent. D1 with ostial 50% stenosis. -CKD  -Anemia  -HTN  -DM  -Hypercholesterolemia, on statin  -COPD        Primary cardiologist is Dr. Sheldon Rodríguez    Plan:     Addendum: Independently seen and evaluated. Agree with below. Because of worsening renal function, her diuretic regimen needs to be curtailed. Continue blood pressure management. Her renal duplex scan on 9/14/2022 did not reveal severe CLEVE. Continue to hold diuretics with worsening renal function. Nephrology following, appreciate assistance. Continued on cardizem and clonidine. Decrease hydralazine to 25 mg TID. Continue ASA and statin. Increase activity as tolerated. Subjective:     Reports increased wheezing with activity.  SOB otherwise stable at rest.     Objective:      Patient Vitals for the past 8 hrs:   Temp Pulse Resp BP SpO2   09/15/22 0739 98 °F (36.7 °C) 65 22 124/71 96 %   09/15/22 0404 -- -- -- -- 95 %   09/15/22 0400 98.2 °F (36.8 °C) 64 17 (!) 115/52 96 %           Patient Vitals for the past 96 hrs:   Weight   09/15/22 0400 105.3 kg (232 lb 3.2 oz)   09/13/22 1000 104 kg (229 lb 4.8 oz)   09/12/22 0509 100.7 kg (222 lb)         TELE: SR               Current Facility-Administered Medications   Medication Dose Route Frequency Last Admin    sodium zirconium cyclosilicate (LOKELMA) powder packet 5 g  5 g Oral DAILY 5 g at 09/15/22 0925    hydrALAZINE (APRESOLINE) tablet 25 mg  25 mg Oral TID      dilTIAZem ER (CARDIZEM CD) capsule 180 mg  180 mg Oral DAILY 180 mg at 09/15/22 0807    insulin glargine (LANTUS) injection 15 Units  15 Units SubCUTAneous ACL 15 Units at 09/14/22 1209    [Held by provider] furosemide (LASIX) tablet 40 mg  40 mg Oral DAILY      cloNIDine HCL (CATAPRES) tablet 0.1 mg  0.1 mg Oral BID 0.1 mg at 09/15/22 0807    insulin lispro (HUMALOG) injection 4 Units  4 Units SubCUTAneous TIDAC 4 Units at 09/15/22 0805    arformoteroL (BROVANA) neb solution 15 mcg  15 mcg Nebulization BID RT 15 mcg at 09/14/22 2056    budesonide (PULMICORT) 500 mcg/2 ml nebulizer suspension  500 mcg Nebulization BID  mcg at 09/14/22 2056    albuterol-ipratropium (DUO-NEB) 2.5 MG-0.5 MG/3 ML  3 mL Nebulization Q4H PRN      aspirin chewable tablet 81 mg  81 mg Oral DAILY 81 mg at 09/15/22 0807    atorvastatin (LIPITOR) tablet 40 mg  40 mg Oral QHS 40 mg at 09/14/22 2159    gabapentin (NEURONTIN) capsule 400 mg  400 mg Oral DAILY 400 mg at 09/15/22 0807    sodium chloride (NS) flush 5-40 mL  5-40 mL IntraVENous Q8H 10 mL at 09/15/22 0551    sodium chloride (NS) flush 5-40 mL  5-40 mL IntraVENous PRN      acetaminophen (TYLENOL) tablet 650 mg  650 mg Oral Q6H  mg at 09/09/22 2002    Or    acetaminophen (TYLENOL) suppository 650 mg  650 mg Rectal Q6H PRN      polyethylene glycol (MIRALAX) packet 17 g  17 g Oral DAILY PRN      ondansetron (ZOFRAN ODT) tablet 4 mg  4 mg Oral Q8H PRN      Or    ondansetron (ZOFRAN) injection 4 mg  4 mg IntraVENous Q6H PRN      enoxaparin (LOVENOX) injection 40 mg  40 mg SubCUTAneous DAILY 40 mg at 09/10/22 0912    insulin lispro (HUMALOG) injection   SubCUTAneous AC&HS 6 Units at 09/15/22 0805    glucose chewable tablet 16 g  16 g Oral PRN      glucagon (GLUCAGEN) injection 1 mg  1 mg IntraMUSCular PRN      dextrose 10% infusion 0-250 mL  0-250 mL IntraVENous PRN      naloxone (NARCAN) injection 0.4 mg  0.4 mg IntraVENous EVERY 2 MINUTES AS NEEDED      oxyCODONE-acetaminophen (PERCOCET) 5-325 mg per tablet 1 Tablet  1 Tablet Oral Q4H PRN 1 Tablet at 09/15/22 0810         Intake/Output Summary (Last 24 hours) at 9/15/2022 0932  Last data filed at 9/14/2022 1745  Gross per 24 hour   Intake 720 ml   Output --   Net 720 ml         Physical Exam:  General:  alert, cooperative, no distress, appears stated age  Neck:  no JVD  Lungs:  expiratory wheezes B/L   Heart:  regular rate and rhythm, S1, S2 normal, no murmur, click, rub or gallop  Abdomen:  abdomen is soft without significant tenderness, masses, organomegaly or guarding  Extremities:  extremities normal, atraumatic, no cyanosis or edema    Visit Vitals  /71   Pulse 65   Temp 98 °F (36.7 °C)   Resp 22   Ht 5' 4\" (1.626 m)   Wt 105.3 kg (232 lb 3.2 oz)   SpO2 96%   BMI 39.86 kg/m²       Data Review:     Labs: Results:       Chemistry Recent Labs     09/15/22  0502 09/14/22  0524 09/13/22  0342   * 202* 221*   * 137 137   K 5.9* 5.5 5.4    106 106   CO2 22 22 22   BUN 93* 80* 78*   CREA 2.39* 2.17* 2.28*   CA 9.3 9.3 8.5   AGAP 7 9 9   BUCR 39* 37* 34*        CBC w/Diff No results for input(s): WBC, RBC, HGB, HCT, PLT, GRANS, LYMPH, EOS, HGBEXT, HCTEXT, PLTEXT, HGBEXT, HCTEXT, PLTEXT in the last 72 hours. Cardiac Enzymes No results found for: CPK, CK, CKMMB, CKMB, RCK3, CKMBT, CKNDX, CKND1, GODFREY, TROPT, TROIQ, SANG, TROPT, TNIPOC, BNP, BNPP   Coagulation No results for input(s): PTP, INR, APTT, INREXT, INREXT in the last 72 hours.       Lipid Panel Lab Results   Component Value Date/Time    Cholesterol, total 152 10/10/2019 12:00 AM    HDL Cholesterol 46 10/10/2019 12:00 AM    LDL, calculated 86 10/10/2019 12:00 AM    VLDL, calculated 20 10/10/2019 12:00 AM    Triglyceride 101 10/10/2019 12:00 AM    CHOL/HDL Ratio 5.6 (H) 05/21/2018 06:23 AM      BNP No results found for: BNP, BNPP, XBNPT   Liver Enzymes No results for input(s): TP, ALB, TBIL, AP in the last 72 hours.     No lab exists for component: SGOT, GPT, DBIL     Thyroid Studies Lab Results   Component Value Date/Time    TSH 0.21 (L) 03/19/2021 04:00 AM          Signed By: Darryl Jauregui PA-C     September 15, 2022

## 2022-09-15 NOTE — DIABETES MGMT
Diabetes/ Glycemic Control Plan of Care    Assessment:   Following for steroids and elevated BG. Steroids were discontinued yesterday  Receiving basal, mealtime and corrective insulin  Lab  mg/dl this morning  Will continue to monitor    Blood glucose values: Within target range (70-180mg/dL):  no    Current insulin orders:   Lantus 20 units daily  Corrective humalog, very insulin resistant, 4 times daily  Mealtime humalog   Total Daily Dose previous 24 hours =   15 units lantus  12 units mealtime humalog    Current A1c:   Lab Results   Component Value Date/Time    Hemoglobin A1c 7.2 (H) 06/29/2022 03:12 AM      equivalent  to ave Blood Glucose of 160 mg/dl for 2-3 months prior to admission  Adequate glycemic control PTA: no    Nutrition/Diet:   Active Orders   Diet    ADULT DIET Regular; 3 carb choices (45 gm/meal); No Salt Added (3-4 gm);  No Concentrated Sweets      Home diabetes medications:    insulin detemir (LEVEMIR) 100 unit/mL injection 15 units daily for diabetes       Alin Caraballo MPH RN [employer]  Office [phone]

## 2022-09-15 NOTE — PROGRESS NOTES
Progress Note  Pulmonary, Critical Care, and Sleep Medicine    Name: Lisa Flaherty MRN: [id number]   : 1950 Hospital: [address] [place] [address] Dr   Date: 9/15/2022        IMPRESSION:   Wheezing in a former smoker likely bronchospasm/undiagnosed COPD vs \"cardiac asthma\" in the setting of chronic systolic/diastolic heart failure, favor the latter Echo from  with EF 45% and mild to moderate mitral regurgitation  Acute hypoxic respiratory failure related to flash pulmonary edema. Resolved and now on RA  Hypertensive crisis, resolved  TESSA previously on CPAP. Was followed by Dr. Asael Hernandez  DM suboptimal control  Acute on chronic kidney disease  Ground glass opacities on CT from August probably due to COVID vs heart failure  History of COPD although PFT in  consistent with restrictive lung disease  History of STEMI in 2018 requiring intubation and mechanical ventilation  Former smoker 46 PY, quit in 2019  Chronic pain syndrome  Obesity Body mass index is 38.11 kg/m². Recent COVID positive       PLAN:   Prn bronchodilators  Continue BIPAP HS and prn gregor   Needs outpatient sleep study/titration, pt to follow with Dr. Aurora Ng ordered as unable to R/o COPD. May switch back to [facility] on discharge  Arrange for office follow up  Outpatient PFT's and 6 minute walk   Prophylaxis issues and glycemic control per primary team     Subjective/Interval History:   I have reviewed the flowsheet and previous days notes. Reviewed interval history. Discussed management with nursing staff.    09/15/22  Denies SOB  Complains of tiredness and dry mouth  Glucoses over 200  BIPAP resumed overnight. No chest pain      ROS:Pertinent items are noted in HPI. Orders reviewed including medications. Changes made if indicated. Telemetry monitor reviewed at the bedside.   Objective:   Vital Signs:    Visit Vitals  /71   Pulse 65   Temp 98 °F (36.7 °C)   Resp 22   Ht 5' 4\" (1.626 m)   Wt 105.3 kg (232 lb 3.2 oz)   SpO2 96% BMI 39.86 kg/m²       O2 Device: None (Room air)   O2 Flow Rate (L/min): 2 l/min   Temp (24hrs), Av.2 °F (36.8 °C), Min:97.6 °F (36.4 °C), Max:98.7 °F (37.1 °C)       Intake/Output:   Last shift:      09/15 07 - 09/15 1900  In: 120 [P.O.:120]  Out: -   Last 3 shifts: 1901 - 09/15 0700  In: 720 [P.O.:720]  Out: 250 [Urine:250]    Intake/Output Summary (Last 24 hours) at 9/15/2022 1051  Last data filed at 9/15/2022 0959  Gross per 24 hour   Intake 840 ml   Output --   Net 840 ml        Physical Exam:    General:  Alert, cooperative, in no distress, appears stated age. Obese    Head:  Normocephalic, without obvious abnormality, atraumatic. Eyes:  ANicteric, PERRL,   Nose: Nares normal. Mucosa normal. No drainage or sinus tenderness. Throat: Lips, mucosa, and tongue normal.     Neck: Supple, symmetrical, trachea midline, no adenopathy, thyroid: no enlargment/tenderness/nodules, no carotid bruit and no JVD    Back:   Symmetric    Lungs:   Bilateral auscultation distant breath sounds, no rales or wheezes   Chest wall:  No tenderness or deformity. NO intercostal retractions   Heart:  Regular rate and rhythm, S1, S2 normal, no murmur, click, rub or gallop. Abdomen:   Soft, non-tender. Bowel sounds normal. No masses,  No organomegaly. NO paradoxical motion   Extremities: normal, atraumatic, no cyanosis, trace pedal edema   Pulses: 2+ and symmetric all extremities. Skin: Skin color, texture, turgor normal. No rashes or lesions. NO clubbing   Lymph nodes: Cervical, supraclavicular nodes normal.   Neurologic: Grossly nonfocal      :        Devices:             Drips:    DATA:  Labs:  No results for input(s): WBC, HGB, HCT, PLT, HGBEXT, HCTEXT, PLTEXT, HGBEXT, HCTEXT, PLTEXT in the last 72 hours.   Recent Labs     09/15/22  0502 22  0524 22  0342   * 137 137   K 5.9* 5.5 5.4    106 106   CO2  22   * 202* 221*   BUN 93* 80* 78*   CREA 2.39* 2.17* 2.28*   CA 9.3 9.3 8.5 No results for input(s): PH, PCO2, PO2, HCO3, FIO2 in the last 72 hours. Imaging:  [x]        I have personally reviewed the patients radiographs and reports  []         []        No change from prior, tubes and lines in adequate position  [x]        Improved   []        Worsening    XR Results (most recent):  Results from Hospital Encounter encounter on 09/09/22    XR CHEST PORT    Narrative  EXAM: XR CHEST PORT    CLINICAL INDICATION/HISTORY: 81 years Female. shortness of breath. thanks. Additional History: None    TECHNIQUE: Frontal view of the chest    COMPARISON: 9/9/2022    FINDINGS:    Multiple devices project over the patient. The cardiac silhouette appears within  normal limits. Mild pulmonary vascular congestion, unchanged. Aortic  calcifications noted. Improved but incompletely resolved interstitial opacities at the right lung  base. No definite evidence of pleural effusion or pneumothorax. No acute osseous abnormality appreciated. Right-sided subacromial spur. Impression  1. Improved but incompletely resolved interstitial opacities at the right lung  base, which may reflect atelectasis, pneumonia, or edema. 2.  Unchanged mild pulmonary vascular congestion. High complexity decision making was performed during this consultation and evaluation.  Critical care time exclusive of procedures spent managing the patient:      maria esther Carvajal MD

## 2022-09-15 NOTE — PROGRESS NOTES
Pt was off of NC asked if she wanted to go ahead and put it on and she stated that she did not want it on.  Pt was sating at 95%

## 2022-09-15 NOTE — PROGRESS NOTES
Progress Note      21-year-old female with past medical history of hypertension, congestive heart failure, admitted for decompensated heart failure, following for renal failure  Subjective      Overnight event noted  Tolerating bipap at night   No accurate urine output documentation   Doppler renal artery shows higher PSV at left renal artery origin  Chronic atherosclerotic changes     IMPRESSION:   Acute on chronic kidney injury, rising creatinine and BUN in setting of overdiuresis  CKD stage III with proteinuria, baseline creatinine around 1.5 mg per DL, history of frequent CARA  Uremia, asymptomatic  Decompensated heart failure, diastolic heart failure, improving symptoms  Diabetes  Secondary hyperparathyroidism of CKD   PLAN:   Hold diuretics for today  as BUN continue climbing up. Started on lokelma, monitor potassium. Adjust medication per current renal function status. Discussed with cardiology colleague.      Facility-Administered Medications: None       Current Facility-Administered Medications   Medication Dose Route Frequency    sodium zirconium cyclosilicate (LOKELMA) powder packet 5 g  5 g Oral DAILY    hydrALAZINE (APRESOLINE) tablet 25 mg  25 mg Oral TID    dilTIAZem ER (CARDIZEM CD) capsule 180 mg  180 mg Oral DAILY    insulin glargine (LANTUS) injection 15 Units  15 Units SubCUTAneous ACL    [Held by provider] furosemide (LASIX) tablet 40 mg  40 mg Oral DAILY    cloNIDine HCL (CATAPRES) tablet 0.1 mg  0.1 mg Oral BID    insulin lispro (HUMALOG) injection 4 Units  4 Units SubCUTAneous TIDAC    arformoteroL (BROVANA) neb solution 15 mcg  15 mcg Nebulization BID RT    budesonide (PULMICORT) 500 mcg/2 ml nebulizer suspension  500 mcg Nebulization BID RT    albuterol-ipratropium (DUO-NEB) 2.5 MG-0.5 MG/3 ML  3 mL Nebulization Q4H PRN    aspirin chewable tablet 81 mg  81 mg Oral DAILY    atorvastatin (LIPITOR) tablet 40 mg  40 mg Oral QHS    gabapentin (NEURONTIN) capsule 400 mg  400 mg Oral DAILY sodium chloride (NS) flush 5-40 mL  5-40 mL IntraVENous Q8H    sodium chloride (NS) flush 5-40 mL  5-40 mL IntraVENous PRN    acetaminophen (TYLENOL) tablet 650 mg  650 mg Oral Q6H PRN    Or    acetaminophen (TYLENOL) suppository 650 mg  650 mg Rectal Q6H PRN    polyethylene glycol (MIRALAX) packet 17 g  17 g Oral DAILY PRN    ondansetron (ZOFRAN ODT) tablet 4 mg  4 mg Oral Q8H PRN    Or    ondansetron (ZOFRAN) injection 4 mg  4 mg IntraVENous Q6H PRN    enoxaparin (LOVENOX) injection 40 mg  40 mg SubCUTAneous DAILY    insulin lispro (HUMALOG) injection   SubCUTAneous AC&HS    glucose chewable tablet 16 g  16 g Oral PRN    glucagon (GLUCAGEN) injection 1 mg  1 mg IntraMUSCular PRN    dextrose 10% infusion 0-250 mL  0-250 mL IntraVENous PRN    naloxone (NARCAN) injection 0.4 mg  0.4 mg IntraVENous EVERY 2 MINUTES AS NEEDED    oxyCODONE-acetaminophen (PERCOCET) 5-325 mg per tablet 1 Tablet  1 Tablet Oral Q4H PRN       Review of Systems:   As above . Data Review:    Labs: Results:       Chemistry Recent Labs     09/15/22  0502 09/14/22  0524 09/13/22  0342   * 202* 221*   * 137 137   K 5.9* 5.5 5.4    106 106   CO2 22 22 22   BUN 93* 80* 78*   CREA 2.39* 2.17* 2.28*   CA 9.3 9.3 8.5   AGAP 7 9 9   BUCR 39* 37* 34*      CBC w/Diff No results for input(s): WBC, RBC, HGB, HCT, PLT, GRANS, LYMPH, EOS, HGBEXT, HCTEXT, PLTEXT, HGBEXT, HCTEXT, PLTEXT in the last 72 hours. Coagulation No results for input(s): PTP, INR, APTT, INREXT, INREXT in the last 72 hours. Iron/Ferritin No results for input(s): IRON in the last 72 hours. No lab exists for component: TIBCCALC   BNP No results for input(s): BNPP in the last 72 hours. Cardiac Enzymes No results for input(s): CPK, CKND1, GODFREY in the last 72 hours. No lab exists for component: CKRMB, TROIP   Liver Enzymes No results for input(s): TP, ALB, TBIL, AP in the last 72 hours.     No lab exists for component: SGOT, GPT, DBIL   Thyroid Studies Lab Results   Component Value Date/Time    TSH 0.21 (L) 03/19/2021 04:00 AM         EKG: normal EKG, normal sinus rhythm.     Physical Assessment:   Visit Vitals  /71   Pulse 65   Temp 98 °F (36.7 °C)   Resp 22   Ht 5' 4\" (1.626 m)   Wt 105.3 kg (232 lb 3.2 oz)   SpO2 96%   BMI 39.86 kg/m²     Weight change: 1.315 kg (2 lb 14.4 oz)    Intake/Output Summary (Last 24 hours) at 9/15/2022 1025  Last data filed at 9/14/2022 1745  Gross per 24 hour   Intake 720 ml   Output --   Net 720 ml     Physical Exam:   General: comfortable, no acute distress   HEENT sclera anicteric, supple neck, no thyromegaly  CVS: S1S2 heard,  no rub  RS: + air entry b/l,   Abd: Soft, Non tender, Not distended, Positive bowel sounds, no organomegaly, no CVA / supra pubic tenderness  Neuro: non focal, awake, alert , CN II-XII are grossly intact  Extrm: + edema, no cyanosis, clubbing   Skin: no visible  Rash  Musculoskeletal: No gross joints or bone deformities     Procedures/imaging: see electronic medical records for all procedures, Xrays and details which were not copied into this note but were reviewed prior to creation of Yudith Polk MD  September 15, 2022  [facility]  Office [phone]

## 2022-09-16 ENCOUNTER — APPOINTMENT (OUTPATIENT)
Dept: GENERAL RADIOLOGY | Age: 72
DRG: 291 | End: 2022-09-16
Attending: PHYSICIAN ASSISTANT
Payer: MEDICARE

## 2022-09-16 LAB
ANION GAP SERPL CALC-SCNC: 7 MMOL/L (ref 3–18)
BUN SERPL-MCNC: 94 MG/DL (ref 7–18)
BUN/CREAT SERPL: 38 (ref 12–20)
CALCIUM SERPL-MCNC: 9.2 MG/DL (ref 8.5–10.1)
CHLORIDE SERPL-SCNC: 106 MMOL/L (ref 100–111)
CO2 SERPL-SCNC: 22 MMOL/L (ref 21–32)
CREAT SERPL-MCNC: 2.49 MG/DL (ref 0.6–1.3)
ERYTHROCYTE [DISTWIDTH] IN BLOOD BY AUTOMATED COUNT: 17.4 % (ref 11.6–14.5)
GLUCOSE BLD STRIP.AUTO-MCNC: 108 MG/DL (ref 70–110)
GLUCOSE BLD STRIP.AUTO-MCNC: 113 MG/DL (ref 70–110)
GLUCOSE BLD STRIP.AUTO-MCNC: 148 MG/DL (ref 70–110)
GLUCOSE BLD STRIP.AUTO-MCNC: 217 MG/DL (ref 70–110)
GLUCOSE SERPL-MCNC: 105 MG/DL (ref 74–99)
HBV SURFACE AB SER QL IA: NEGATIVE
HBV SURFACE AB SERPL IA-ACNC: <3.1 MIU/ML
HBV SURFACE AG SER QL: 0.31 INDEX
HBV SURFACE AG SER QL: NEGATIVE
HCT VFR BLD AUTO: 28.5 % (ref 35–45)
HEP BS AB COMMENT,HBSAC: ABNORMAL
HGB BLD-MCNC: 8.5 G/DL (ref 12–16)
MCH RBC QN AUTO: 27.9 PG (ref 24–34)
MCHC RBC AUTO-ENTMCNC: 29.8 G/DL (ref 31–37)
MCV RBC AUTO: 93.4 FL (ref 78–100)
NRBC # BLD: 0.34 K/UL (ref 0–0.01)
NRBC BLD-RTO: 2.4 PER 100 WBC
PLATELET # BLD AUTO: 337 K/UL (ref 135–420)
PMV BLD AUTO: 10.3 FL (ref 9.2–11.8)
POTASSIUM SERPL-SCNC: 5.6 MMOL/L (ref 3.5–5.5)
RBC # BLD AUTO: 3.05 M/UL (ref 4.2–5.3)
SODIUM SERPL-SCNC: 135 MMOL/L (ref 136–145)
WBC # BLD AUTO: 13.9 K/UL (ref 4.6–13.2)

## 2022-09-16 PROCEDURE — 74011250637 HC RX REV CODE- 250/637: Performed by: INTERNAL MEDICINE

## 2022-09-16 PROCEDURE — 65270000046 HC RM TELEMETRY

## 2022-09-16 PROCEDURE — 85027 COMPLETE CBC AUTOMATED: CPT

## 2022-09-16 PROCEDURE — 97116 GAIT TRAINING THERAPY: CPT

## 2022-09-16 PROCEDURE — 74011000250 HC RX REV CODE- 250: Performed by: INTERNAL MEDICINE

## 2022-09-16 PROCEDURE — 74011636637 HC RX REV CODE- 636/637: Performed by: HOSPITALIST

## 2022-09-16 PROCEDURE — 87340 HEPATITIS B SURFACE AG IA: CPT

## 2022-09-16 PROCEDURE — 94762 N-INVAS EAR/PLS OXIMTRY CONT: CPT

## 2022-09-16 PROCEDURE — 99232 SBSQ HOSP IP/OBS MODERATE 35: CPT | Performed by: INTERNAL MEDICINE

## 2022-09-16 PROCEDURE — 99232 SBSQ HOSP IP/OBS MODERATE 35: CPT | Performed by: HOSPITALIST

## 2022-09-16 PROCEDURE — 77010033678 HC OXYGEN DAILY

## 2022-09-16 PROCEDURE — 02HV33Z INSERTION OF INFUSION DEVICE INTO SUPERIOR VENA CAVA, PERCUTANEOUS APPROACH: ICD-10-PCS | Performed by: SURGERY

## 2022-09-16 PROCEDURE — 74011250637 HC RX REV CODE- 250/637: Performed by: PHYSICIAN ASSISTANT

## 2022-09-16 PROCEDURE — 94761 N-INVAS EAR/PLS OXIMETRY MLT: CPT

## 2022-09-16 PROCEDURE — 97110 THERAPEUTIC EXERCISES: CPT

## 2022-09-16 PROCEDURE — 80048 BASIC METABOLIC PNL TOTAL CA: CPT

## 2022-09-16 PROCEDURE — 94640 AIRWAY INHALATION TREATMENT: CPT

## 2022-09-16 PROCEDURE — 86706 HEP B SURFACE ANTIBODY: CPT

## 2022-09-16 PROCEDURE — 36415 COLL VENOUS BLD VENIPUNCTURE: CPT

## 2022-09-16 PROCEDURE — 71045 X-RAY EXAM CHEST 1 VIEW: CPT

## 2022-09-16 PROCEDURE — 82962 GLUCOSE BLOOD TEST: CPT

## 2022-09-16 RX ORDER — INSULIN GLARGINE 100 [IU]/ML
15 INJECTION, SOLUTION SUBCUTANEOUS
Status: DISCONTINUED | OUTPATIENT
Start: 2022-09-16 | End: 2022-09-22 | Stop reason: HOSPADM

## 2022-09-16 RX ORDER — SODIUM CHLORIDE 9 MG/ML
250 INJECTION, SOLUTION INTRAVENOUS
Status: DISCONTINUED | OUTPATIENT
Start: 2022-09-16 | End: 2022-09-22 | Stop reason: HOSPADM

## 2022-09-16 RX ORDER — HYDRALAZINE HYDROCHLORIDE 10 MG/1
10 TABLET, FILM COATED ORAL 3 TIMES DAILY
Status: DISCONTINUED | OUTPATIENT
Start: 2022-09-16 | End: 2022-09-22

## 2022-09-16 RX ADMIN — CLONIDINE HYDROCHLORIDE 0.1 MG: 0.1 TABLET ORAL at 18:38

## 2022-09-16 RX ADMIN — OXYCODONE HYDROCHLORIDE AND ACETAMINOPHEN 1 TABLET: 5; 325 TABLET ORAL at 21:43

## 2022-09-16 RX ADMIN — SODIUM CHLORIDE, PRESERVATIVE FREE 10 ML: 5 INJECTION INTRAVENOUS at 05:51

## 2022-09-16 RX ADMIN — OXYCODONE HYDROCHLORIDE AND ACETAMINOPHEN 1 TABLET: 5; 325 TABLET ORAL at 16:49

## 2022-09-16 RX ADMIN — Medication 6 UNITS: at 21:42

## 2022-09-16 RX ADMIN — SODIUM CHLORIDE, PRESERVATIVE FREE 10 ML: 5 INJECTION INTRAVENOUS at 21:44

## 2022-09-16 RX ADMIN — SODIUM CHLORIDE, PRESERVATIVE FREE 10 ML: 5 INJECTION INTRAVENOUS at 16:46

## 2022-09-16 RX ADMIN — DILTIAZEM HYDROCHLORIDE 120 MG: 120 CAPSULE, COATED, EXTENDED RELEASE ORAL at 10:05

## 2022-09-16 RX ADMIN — ASPIRIN 81 MG CHEWABLE TABLET 81 MG: 81 TABLET CHEWABLE at 10:05

## 2022-09-16 RX ADMIN — HYDRALAZINE HYDROCHLORIDE 25 MG: 25 TABLET ORAL at 10:05

## 2022-09-16 RX ADMIN — ARFORMOTEROL TARTRATE 15 MCG: 15 SOLUTION RESPIRATORY (INHALATION) at 19:50

## 2022-09-16 RX ADMIN — HYDRALAZINE HYDROCHLORIDE 25 MG: 25 TABLET ORAL at 16:49

## 2022-09-16 RX ADMIN — GABAPENTIN 400 MG: 400 CAPSULE ORAL at 10:05

## 2022-09-16 RX ADMIN — Medication 15 UNITS: at 12:44

## 2022-09-16 RX ADMIN — BUDESONIDE 500 MCG: 0.5 SUSPENSION RESPIRATORY (INHALATION) at 09:23

## 2022-09-16 RX ADMIN — HYDRALAZINE HYDROCHLORIDE 10 MG: 10 TABLET, FILM COATED ORAL at 21:43

## 2022-09-16 RX ADMIN — OXYCODONE HYDROCHLORIDE AND ACETAMINOPHEN 1 TABLET: 5; 325 TABLET ORAL at 10:05

## 2022-09-16 RX ADMIN — CLONIDINE HYDROCHLORIDE 0.1 MG: 0.1 TABLET ORAL at 10:05

## 2022-09-16 RX ADMIN — ARFORMOTEROL TARTRATE 15 MCG: 15 SOLUTION RESPIRATORY (INHALATION) at 09:23

## 2022-09-16 RX ADMIN — SODIUM ZIRCONIUM CYCLOSILICATE 5 G: 5 POWDER, FOR SUSPENSION ORAL at 10:17

## 2022-09-16 RX ADMIN — ATORVASTATIN CALCIUM 40 MG: 40 TABLET, FILM COATED ORAL at 21:43

## 2022-09-16 RX ADMIN — BUDESONIDE 500 MCG: 0.5 SUSPENSION RESPIRATORY (INHALATION) at 19:50

## 2022-09-16 NOTE — PROGRESS NOTES
Spoke w/primary RN Sierra Renee and was informed that patient has yet to have [facility] placed. Unable to received patient for HD until access is placed.

## 2022-09-16 NOTE — PROGRESS NOTES
Problem: Mobility Impaired (Adult and Pediatric)  Goal: *Acute Goals and Plan of Care (Insert Text)  Description: Physical Therapy Goals  Initiated 9/13/2022 and to be accomplished within 7 day(s)  1. Patient will move from supine to sit and sit to supine  in bed with modified independence. 2.  Patient will transfer from bed to chair and chair to bed with modified independence using the least restrictive device. 3.  Patient will perform sit to stand with modified independence. 4.  Patient will ambulate with modified independence for 100 feet with the least restrictive device. 5.  Patient will ascend/descend 4 stairs with  handrail(s) with supervision/set-up. PLOF: Patient lives with spouse in single story home with 4 JUSTICE. She was independent without AD. She has rollator at home. Outcome: Progressing Towards Goal     PHYSICAL THERAPY TREATMENT    Patient: Kathia Carvajal (71 y.o. female)  Date: 9/16/2022  Diagnosis: Acute respiratory failure with hypoxia ([facility]) [J96.01]  CHF (congestive heart failure) ([facility] [address] [I50.9] <principal problem not specified>      Precautions: Fall    ASSESSMENT:  RN cleared for mobility. Pt found semi-reclined in bed, in NAD, willing to work with PT. Sup-sit with SBA. Pt stood and amb 20 ft around the room x2 trials with RW. Seated rest in between. Pt performed exercises at end of session. 2 MD's in room to speak with pt at end of session. She was left sitting up on EOB with call bell and all needs met. Progression toward goals:   []      Improving appropriately and progressing toward goals  [x]      Improving slowly and progressing toward goals  []      Not making progress toward goals and plan of care will be adjusted     PLAN:  Patient continues to benefit from skilled intervention to address the above impairments. Continue treatment per established plan of care.     Further Equipment Recommendations for Discharge:  N/A    AMPAC: Based on an AM-PAC score of 18/24 (or **/20 if omitting stairs) and their current functional mobility deficits, it is recommended that the patient have 2-3 sessions per week of Physical Therapy at d/c to increase the patient's independence. This AMPAC score should be considered in conjunction with interdisciplinary team recommendations to determine the most appropriate discharge setting. Patient's social support, diagnosis, medical stability, and prior level of function should also be taken into consideration. SUBJECTIVE:   Patient stated There's a lot going on.    OBJECTIVE DATA SUMMARY:   Critical Behavior:  Neurologic State: Alert  Orientation Level: Oriented X4  Cognition: Follows commands  Safety/Judgement: Fall prevention  Functional Mobility Training:  Bed Mobility:     Supine to Sit: Supervision  Sit to Supine: Supervision            Transfers:  Sit to Stand: Stand-by assistance  Stand to Sit: Stand-by assistance    Balance:  Sitting: Intact; With support  Standing: Impaired; With support  Standing - Static: Good  Standing - Dynamic : Fair       Ambulation/Gait Training:  Distance (ft): 20 Feet (ft) (x2)  Assistive Device: Walker, rolling  Ambulation - Level of Assistance: Stand-by assistance        Gait Abnormalities: Decreased step clearance    Therapeutic Exercises:   Pt performed exercises per flow sheet sitting up on EOB      EXERCISE   Sets   Reps   Active Active Assist   Passive Self ROM   Comments   Ankle Pumps    [] [] [] []    Quad Sets/Glut Sets    [] [] [] [] Hold for 5 secs   Hamstring Sets   [] [] [] []    Short Arc Quads   [] [] [] []    Heel Slides   [] [] [] []    Straight Leg Raises   [] [] [] []    Hip Add   [] [] [] [] Hold for 5 secs, w/ pillow squeeze   Long Arc Quads 1 15 [x] [] [] []    Seated Marching 1 15 [x] [] [] []    Standing Marching   [] [] [] []       [] [] [] []        Pain:  Pain level pre-treatment: 4/10  Pain level post-treatment: 4/10   Pain Intervention(s): Medication (see MAR);  Rest, Ice, Repositioning Response to intervention: Nurse notified, See doc flow    Activity Tolerance:   Pt tolerated mobility well  Please refer to the flowsheet for vital signs taken during this treatment. After treatment:   [] Patient left in no apparent distress sitting up in chair  [x] Patient left in no apparent distress in bed  [x] Call bell left within reach  [x] Nursing notified  [] Caregiver present  [] Bed alarm activated  [] SCDs applied      COMMUNICATION/EDUCATION:   [x]         Role of Physical Therapy in the acute care setting. [x]         Fall prevention education was provided and the patient/caregiver indicated understanding. [x]         Patient/family have participated as able in working toward goals and plan of care. []         Patient/family agree to work toward stated goals and plan of care. []         Patient understands intent and goals of therapy, but is neutral about his/her participation. []         Patient is unable to participate in stated goals/plan of care: ongoing with therapy staff.  []         Other:        Riri Mathew   Time Calculation: 23 mins    [employer] AM-PAC® Basic Mobility Inpatient Short Form (6-Clicks) Version 2    How much HELP from another person does the patient currently need    (If the patient hasn't done an activity recently, how much help from another person do you think he/she would need if he/she tried?)   Total (Total A or Dep)   A Lot  (Mod to Max A)   A Little (Sup or Min A)   None (Mod I to I)   Turning from your back to your side while in a flat bed without using bedrails? [] 1 [] 2 [x] 3 [] 4   2. Moving from lying on your back to sitting on the side of a flat bed without using bedrails? [] 1 [] 2 [x] 3 [] 4   3. Moving to and from a bed to a chair (including a wheelchair)? [] 1 [] 2 [x] 3 [] 4   4. Standing up from a chair using your arms (e.g., wheelchair, or bedside chair)? [] 1 [] 2 [x] 3 [] 4   5. Walking in hospital room?    [] 1 [] 2 [x] 3 [] 4 6. Climbing 3-5 steps with a railing?+   [] 1 [] 2 [x] 3 [] 4   +If stair climbing cannot be assessed, skip item #6. Sum responses from items 1-5. Based on an AM-PAC score of 18/24 (or **/20 if omitting stairs) and their current functional mobility deficits, it is recommended that the patient have 2-3 sessions per week of Physical Therapy at d/c to increase the patient's independence.

## 2022-09-16 NOTE — PROGRESS NOTES
Progress Note  Pulmonary, Critical Care, and Sleep Medicine    Name: Carmen Bautista MRN: [id number]   : 1950 Hospital: [address] [place] [address] Dr   Date: 2022        IMPRESSION:   Wheezing in a former smoker likely bronchospasm/undiagnosed COPD vs \"cardiac asthma\" in the setting of chronic systolic/diastolic heart failure, favor the latter Echo from  with EF 45% and mild to moderate mitral regurgitation  Acute hypoxic respiratory failure related to flash pulmonary edema. On O2 2 LPM  Hypertensive crisis, resolved  TESSA previously on CPAP. Was followed by Dr. Meghan Whittaker  DM 2  Acute on chronic kidney disease with increasing BUN/Creatinine  Worsening hyperkalemia  Ground glass opacities on CT from August probably due to COVID vs heart failure  History of COPD although PFT in  consistent with restrictive lung disease  History of STEMI in 2018 requiring intubation and mechanical ventilation  Former smoker 46 PY, quit in 2019  Chronic pain syndrome  Obesity Body mass index is 38.11 kg/m². Recent COVID positive       PLAN:   Prn bronchodilators  Continue BIPAP HS and prn gregor   Needs outpatient sleep study/titration, pt to follow with Dr. Hanny Sanders ordered as unable to R/o COPD. May switch back to [facility] on discharge  Management of hyperkalemia and CARA per nephrology and may include dialysis  Outpatient PFT's and 6 minute walk to further characterize deficits. Suspect more of restrictive disorder   Prophylaxis issues and glycemic control per primary team     Subjective/Interval History:   I have reviewed the flowsheet and previous days notes. Reviewed interval history. Discussed management with nursing staff.    22  Denies SOB  Still with chest tightness but no chest pain  Off BIPAP, eating breakfast      ROS:Pertinent items are noted in HPI. Orders reviewed including medications. Changes made if indicated. Telemetry monitor reviewed at the bedside.   Objective:   Vital Signs:    Visit Vitals  /63 (BP 1 Location: Right upper arm, BP Patient Position: At rest)   Pulse 67   Temp 97.2 °F (36.2 °C)   Resp 18   Ht 5' 4\" (1.626 m)   Wt 106.6 kg (235 lb)   SpO2 97%   BMI 40.34 kg/m²       O2 Device: Nasal cannula   O2 Flow Rate (L/min): 2 l/min   Temp (24hrs), Av.8 °F (36.6 °C), Min:97.2 °F (36.2 °C), Max:98.2 °F (36.8 °C)       Intake/Output:   Last shift:      No intake/output data recorded. Last 3 shifts:  1901 -  0700  In: 720 [P.O.:720]  Out: -     Intake/Output Summary (Last 24 hours) at 2022 1001  Last data filed at 9/15/2022 2152  Gross per 24 hour   Intake 600 ml   Output --   Net 600 ml        Physical Exam:    General:  Alert, cooperative, in no distress, appears stated age. Obese    Head:  Normocephalic, without obvious abnormality, atraumatic. Eyes:  ANicteric, PERRL,   Nose: Nares normal. Mucosa normal. No drainage or sinus tenderness. Throat: Lips, mucosa, and tongue normal.     Neck: Supple, symmetrical, trachea midline, no adenopathy, thyroid: no enlargment/tenderness/nodules, no carotid bruit and no JVD    Back:   Symmetric    Lungs:   Bilateral auscultation distant breath sounds, no rales or wheezes   Chest wall:  No tenderness or deformity. NO intercostal retractions   Heart:  Regular rate and rhythm, S1, S2 normal, no murmur, click, rub or gallop. Abdomen:   Soft, non-tender. Bowel sounds normal. No masses,  No organomegaly. NO paradoxical motion   Extremities: normal, atraumatic, no cyanosis, trace pedal edema   Pulses: 2+ and symmetric all extremities. Skin: Skin color, texture, turgor normal. No rashes or lesions.  NO clubbing   Lymph nodes: Cervical, supraclavicular nodes normal.   Neurologic: Grossly nonfocal      :        Devices:             Drips:    DATA:  Labs:  Recent Labs     22  0511   WBC 13.9*   HGB 8.5*   HCT 28.5*        Recent Labs     22  0511 09/15/22  0502 22  0524   * 135* 137   K 5.6* 5.9* 5.5  106 106   CO2 22 22 22   * 167* 202*   BUN 94* 93* 80*   CREA 2.49* 2.39* 2.17*   CA 9.2 9.3 9.3     No results for input(s): PH, PCO2, PO2, HCO3, FIO2 in the last 72 hours. Imaging:  [x]        I have personally reviewed the patients radiographs and reports  []         []        No change from prior, tubes and lines in adequate position  [x]        Improved   []        Worsening    XR Results (most recent):  Results from Hospital Encounter encounter on 09/09/22    XR CHEST PORT    Narrative  EXAM: XR CHEST PORT    CLINICAL INDICATION/HISTORY: 81 years Female. shortness of breath. thanks. Additional History: None    TECHNIQUE: Frontal view of the chest    COMPARISON: 9/9/2022    FINDINGS:    Multiple devices project over the patient. The cardiac silhouette appears within  normal limits. Mild pulmonary vascular congestion, unchanged. Aortic  calcifications noted. Improved but incompletely resolved interstitial opacities at the right lung  base. No definite evidence of pleural effusion or pneumothorax. No acute osseous abnormality appreciated. Right-sided subacromial spur. Impression  1. Improved but incompletely resolved interstitial opacities at the right lung  base, which may reflect atelectasis, pneumonia, or edema. 2.  Unchanged mild pulmonary vascular congestion. High complexity decision making was performed during this consultation and evaluation.  Critical care time exclusive of procedures spent managing the patient:      maria esther Hickey MD

## 2022-09-16 NOTE — DIABETES MGMT
Diabetes/ Glycemic Control Plan of Care    BG continue to improve following discontinuation of steroids  Recommend lowering her lantus to her home dose of 15 units daily  Discontinue mealtime dose of humalog  - orders obtained   Will continue to monitor    Blood glucose values: Within target range (70-180mg/dL):  no    Current insulin orders:   Corrective humalog, very insulin resistant, 4 times daily  Lantus 15 units daily  Total Daily Dose previous 24 hours = 50 units  20 units lantus  12 units mealtime humalog  18 units corrective humalog    Current A1c:   Lab Results   Component Value Date/Time    Hemoglobin A1c 7.2 (H) 06/29/2022 03:12 AM      equivalent  to ave Blood Glucose of 160 mg/dl for 2-3 months prior to admission  Adequate glycemic control PTA: no    Nutrition/Diet:   Active Orders   Diet    ADULT DIET Regular; 3 carb choices (45 gm/meal); No Salt Added (3-4 gm);  No Concentrated Sweets      Home diabetes medications:    insulin detemir (LEVEMIR) 100 unit/mL injection 15 units daily for diabetes       Leana Marsh MPH RN [facility] [phone]

## 2022-09-16 NOTE — PROGRESS NOTES
[facility]  [facility]    Patient: Velasquez Vaughn Age: 81 y.o. : 1950 MR#: [id number] SSN: [id number]  Date/Time: 2022     Subjective:     72-year-old female with a history of chronic diastolic CHF, COPD presents to the hospital secondary to shortness of breath. Patient noted to have elevated blood pressure in the emergency room, started on nitro gtt. Patient admitted to stepdown unit for further evaluation. Creatinine increased to 2.49. BUN increased to 94. K 5.6. Patient seen and examined at bedside, she expresses understanding that she will be started on dialysis. She asked questions regarding what dialysis entails. Assessment/Plan:     Hypertensive emergency-blood pressure improving, off nitro gtt. , continue hydralazine, Coreg, amlodipine, catapres. Avoid ACE ARB given CARA. Diuretic held at this time. Acute on chronic diastolic CHF exacerbation. Diuretics held. DM2 with steroid-induced hyperglycemia improving. decrease Lantus. SSI. Diabetes management follows. Mild to moderate COPD with ? exacerbation. Stop steroids and monitor. Bronchodilators. RT for bronchial hygiene protocol. Pulmonology input appreciated. Obesity Body mass index is 40.34 kg/m². CARA on ckd3. Initiation of dialysis tomorrow, line to be placed this afternoon. Continue to monitor renal indices, urine output. Nephrology input appreciated. Right basilar infiltrate likely asymmetric edema. Postmenopausal vaginal bleeding. Close outpatient follow-up with OB/GYN. 9. PFT in 2017 consistent with restrictive lung disease  10. Former tobacco.  6. Prior levi diagnosis. Bipap qHS and prn. Repeat sleep studies w/ Dr. Jayne Velasquez. 12. Patient meets criteria for low dose CT screen. 13. Suprapubic pain. UA negative. 14. Hyperkalemia. [facility]. Dialysis to start tomorrow. 15. DVT prophylaxis with heparin subcut 3 times daily  16. Full code. Hh ordered.  Patient did not qualify for home oxygen 2022. I discussed case with Dr. Timbo Calhoun. Discussed on IDR. Angeline Rosales MD  22      Case discussed with:  [x]Patient  []Family  [x]Nursing  [x]Case Management  DVT Prophylaxis:  [x]Lovenox  []Hep SQ  []SCDs  []Coumadin   []On Heparin gtt    Objective:   VS: Visit Vitals  /63 (BP 1 Location: Right upper arm, BP Patient Position: At rest)   Pulse 67   Temp 97.2 °F (36.2 °C)   Resp 18   Ht 5' 4\" (1.626 m)   Wt 106.6 kg (235 lb)   SpO2 97%   BMI 40.34 kg/m²        Tmax/24hrs: Temp (24hrs), Av.8 °F (36.6 °C), Min:97.2 °F (36.2 °C), Max:98.2 °F (36.8 °C)  IOBRIEF  Intake/Output Summary (Last 24 hours) at 2022 0944  Last data filed at 9/15/2022 2152  Gross per 24 hour   Intake 720 ml   Output --   Net 720 ml       General:  Alert, awake oriented x4. Found sitting up on edge of bed, speaking in full sentences on supplemental oxygen. HEENT normocephalic atraumatic pupils equally round reactive to light oropharynx clear  Pulmonary: improved air entry. No wheeze. No crackles. Cardiovascular: Regular rate and Rhythm. GI:  Soft, Non distended, Non tender. Normoactive bowel sounds. Extremities:  No edema, cyanosis, clubbing. No calf tenderness. Neurologic: Alert and oriented X 4. No acute neuro deficits. Additional: No rash to visible skin.     Medications:   Current Facility-Administered Medications   Medication Dose Route Frequency    insulin glargine (LANTUS) injection 15 Units  15 Units SubCUTAneous ACL    sodium zirconium cyclosilicate (LOKELMA) powder packet 5 g  5 g Oral DAILY    hydrALAZINE (APRESOLINE) tablet 25 mg  25 mg Oral TID    dilTIAZem ER (CARDIZEM CD) capsule 120 mg  120 mg Oral DAILY    heparin (porcine) injection 5,000 Units  5,000 Units SubCUTAneous Q8H    [Held by provider] furosemide (LASIX) tablet 40 mg  40 mg Oral DAILY    cloNIDine HCL (CATAPRES) tablet 0.1 mg  0.1 mg Oral BID    arformoteroL (BROVANA) neb solution 15 mcg  15 mcg Nebulization BID RT    budesonide (PULMICORT) 500 mcg/2 ml nebulizer suspension  500 mcg Nebulization BID RT    albuterol-ipratropium (DUO-NEB) 2.5 MG-0.5 MG/3 ML  3 mL Nebulization Q4H PRN    aspirin chewable tablet 81 mg  81 mg Oral DAILY    atorvastatin (LIPITOR) tablet 40 mg  40 mg Oral QHS    gabapentin (NEURONTIN) capsule 400 mg  400 mg Oral DAILY    sodium chloride (NS) flush 5-40 mL  5-40 mL IntraVENous Q8H    sodium chloride (NS) flush 5-40 mL  5-40 mL IntraVENous PRN    acetaminophen (TYLENOL) tablet 650 mg  650 mg Oral Q6H PRN    Or    acetaminophen (TYLENOL) suppository 650 mg  650 mg Rectal Q6H PRN    polyethylene glycol (MIRALAX) packet 17 g  17 g Oral DAILY PRN    ondansetron (ZOFRAN ODT) tablet 4 mg  4 mg Oral Q8H PRN    Or    ondansetron (ZOFRAN) injection 4 mg  4 mg IntraVENous Q6H PRN    insulin lispro (HUMALOG) injection   SubCUTAneous AC&HS    glucose chewable tablet 16 g  16 g Oral PRN    glucagon (GLUCAGEN) injection 1 mg  1 mg IntraMUSCular PRN    dextrose 10% infusion 0-250 mL  0-250 mL IntraVENous PRN    naloxone (NARCAN) injection 0.4 mg  0.4 mg IntraVENous EVERY 2 MINUTES AS NEEDED    oxyCODONE-acetaminophen (PERCOCET) 5-325 mg per tablet 1 Tablet  1 Tablet Oral Q4H PRN       Imaging:   XR Results (most recent):  Results from Hospital Encounter encounter on 09/09/22    XR CHEST PORT    Narrative  EXAM: XR CHEST PORT    CLINICAL INDICATION/HISTORY: 81 years Female. shortness of breath. thanks. Additional History: None    TECHNIQUE: Frontal view of the chest    COMPARISON: 9/9/2022    FINDINGS:    Multiple devices project over the patient. The cardiac silhouette appears within  normal limits. Mild pulmonary vascular congestion, unchanged. Aortic  calcifications noted. Improved but incompletely resolved interstitial opacities at the right lung  base. No definite evidence of pleural effusion or pneumothorax. No acute osseous abnormality appreciated.  Right-sided subacromial spur.    Impression  1. Improved but incompletely resolved interstitial opacities at the right lung  base, which may reflect atelectasis, pneumonia, or edema. 2.  Unchanged mild pulmonary vascular congestion. CT Results (most recent):  Results from Hospital Encounter encounter on 08/08/22    CTA CHEST W OR W WO CONT    Narrative  CT CHEST PULMONARY EMBOLISM PROTOCOL    CPT code: 72035    INDICATION: Dyspnea. Covid positive. Question pulmonary embolism. TECHNIQUE: 8.8SR collimation helical images obtained through the level of the  pulmonary arteries with additional imaging through the chest following the  uneventful administration of 72 cc's nonionic intravenous contrast.  - Images reconstructed into three dimensional coronal and sagittal projections  for complete evaluation of the tortuous and overlapping pulmonary vascular  structures and to reduce patient radiation dose. All CT scans at this facility are performed using dose optimization technique as  appropriate to this specific exam, to include automated exposure control,  adjustment of the mA and/or KP according to patient size or use of iterative  reconstruction techniques. COMPARISON: Prior CT 7/8/2022    FINDINGS:  Opacification of the pulmonary arteries is adequate. No filling defects are appreciated within the main, left, right, lobar or  visualized segmental pulmonary arteries to suggest embolism. Main pulmonary artery is enlarged, measuring up to 3.8 cm diameter, similar to  the prior exam. Findings can be seen in setting of pulmonary artery  hypertension. Heart size top normal. No pericardial effusion. Nonenlarged aorta. Atherosclerotic calcification in the arch and at level of  proximal arch branch vessels. Geographic areas of mildly increased groundglass attenuation in the right upper  lobe fairly diffuse, without consolidation. Less extensive changes in the  superior aspect of the right middle lobe.  Similar findings in the superior  segment right lower lobe and ventral to central right lower lobe. -Dependent peripheral atelectasis/consolidation subsegmental right lower lobe  adjacent to a small effusion. Similar appearing subsegmental dependent atelectasis left lower lobe adjacent to  a trace effusion. Mild degree atherosclerotic narrowing of the proximal SMA. Impression  1. No CT evidence for central pulmonary embolism.  -Similar enlargement of central pulmonary arteries suggest pulmonary artery  hypertension. 2. Multifocal geographic and peribronchial groundglass airspace infiltrates more  significant in the right lung than left. Imaging appearance is not specifically  characteristic of Covid pneumonia and could reflect viral inflammation or less  likely developing asymmetric pulmonary edema. 3. Multisegmental dependent atelectasis/consolidation right lower lobe adjacent  to a small effusion.  -Similar but less extensive subsegmental atelectasis adjacent to trace left  effusion. 06/25/22    ECHO ADULT COMPLETE 06/29/2022 6/29/2022    Interpretation Summary  Formatting of this result is different from the original.      Left Ventricle: Mildly reduced left ventricular systolic function with a visually estimated EF of 50 - 55%. Left ventricle is mildly dilated. Increased wall thickness. Findings consistent with mild concentric hypertrophy. See diagram for wall motion findings. Diastolic dysfunction present with normal LV EF. Mitral Valve: Mildly thickened leaflet. Mild regurgitation with a centrally directed jet. Signed by: Raul Ortiz MD on 6/29/2022 12:12 PM       MRI Results (most recent):  No results found for this or any previous visit.         Labs:    Recent Results (from the past 48 hour(s))   GLUCOSE, POC    Collection Time: 09/14/22 10:52 AM   Result Value Ref Range    Glucose (POC) 291 (H) 70 - 110 mg/dL   GLUCOSE, POC    Collection Time: 09/14/22  3:51 PM   Result Value Ref Range    Glucose (POC) 262 (H) 70 - 110 mg/dL   GLUCOSE, POC    Collection Time: 09/14/22  9:15 PM   Result Value Ref Range    Glucose (POC) 279 (H) 70 - 454 mg/dL   METABOLIC PANEL, BASIC    Collection Time: 09/15/22  5:02 AM   Result Value Ref Range    Sodium 135 (L) 136 - 145 mmol/L    Potassium 5.9 (H) 3.5 - 5.5 mmol/L    Chloride 106 100 - 111 mmol/L    CO2 22 21 - 32 mmol/L    Anion gap 7 3.0 - 18 mmol/L    Glucose 167 (H) 74 - 99 mg/dL    BUN 93 (H) 7.0 - 18 MG/DL    Creatinine 2.39 (H) 0.6 - 1.3 MG/DL    BUN/Creatinine ratio 39 (H) 12 - 20      GFR est AA 24 (L) >60 ml/min/1.73m2    GFR est non-AA 20 (L) >60 ml/min/1.73m2    Calcium 9.3 8.5 - 10.1 MG/DL   GLUCOSE, POC    Collection Time: 09/15/22  7:25 AM   Result Value Ref Range    Glucose (POC) 200 (H) 70 - 110 mg/dL   GLUCOSE, POC    Collection Time: 09/15/22 10:52 AM   Result Value Ref Range    Glucose (POC) 234 (H) 70 - 110 mg/dL   GLUCOSE, POC    Collection Time: 09/15/22 11:46 AM   Result Value Ref Range    Glucose (POC) 198 (H) 70 - 110 mg/dL   GLUCOSE, POC    Collection Time: 09/15/22  3:37 PM   Result Value Ref Range    Glucose (POC) 171 (H) 70 - 110 mg/dL   GLUCOSE, POC    Collection Time: 09/15/22  9:15 PM   Result Value Ref Range    Glucose (POC) 214 (H) 70 - 728 mg/dL   METABOLIC PANEL, BASIC    Collection Time: 09/16/22  5:11 AM   Result Value Ref Range    Sodium 135 (L) 136 - 145 mmol/L    Potassium 5.6 (H) 3.5 - 5.5 mmol/L    Chloride 106 100 - 111 mmol/L    CO2 22 21 - 32 mmol/L    Anion gap 7 3.0 - 18 mmol/L    Glucose 105 (H) 74 - 99 mg/dL    BUN 94 (H) 7.0 - 18 MG/DL    Creatinine 2.49 (H) 0.6 - 1.3 MG/DL    BUN/Creatinine ratio 38 (H) 12 - 20      GFR est AA 23 (L) >60 ml/min/1.73m2    GFR est non-AA 19 (L) >60 ml/min/1.73m2    Calcium 9.2 8.5 - 10.1 MG/DL   CBC W/O DIFF    Collection Time: 09/16/22  5:11 AM   Result Value Ref Range    WBC 13.9 (H) 4.6 - 13.2 K/uL    RBC 3.05 (L) 4.20 - 5.30 M/uL    HGB 8.5 (L) 12.0 - 16.0 g/dL    HCT 28.5 (L) 35.0 - 45.0 % MCV 93.4 78.0 - 100.0 FL    MCH 27.9 24.0 - 34.0 PG    MCHC 29.8 (L) 31.0 - 37.0 g/dL    RDW 17.4 (H) 11.6 - 14.5 %    PLATELET 039 248 - 822 K/uL    MPV 10.3 9.2 - 11.8 FL    NRBC 2.4 (H) 0  WBC    ABSOLUTE NRBC 0.34 (H) 0.00 - 0.01 K/uL   GLUCOSE, POC    Collection Time: 09/16/22  8:06 AM   Result Value Ref Range    Glucose (POC) 113 (H) 70 - 110 mg/dL       Signed By: Mika Groves MD     September 16, 2022      I spent 25 minutes with the patient in face-to-face consultation, of which greater than 50% was spent in counseling and coordination of care as described above    Disclaimer: Sections of this note are dictated using utilizing voice recognition software. Minor typographical errors may be present. If questions arise, please do not hesitate to contact me or call our department.

## 2022-09-16 NOTE — PROGRESS NOTES
Progress Note      74-year-old female with past medical history of hypertension, congestive heart failure, admitted for decompensated heart failure, following for renal failure  Subjective      Overnight event noted  Discussed with her about worsening of renal function,   Urine output remain poor,     I have discussed risk and benefit for dialysis. Dialysis helps to improve breathing, remove waste product which can cause life threatening condition , such as chest pain, seizure, etc, helps to correct as well as to  avoid  various electrolyte imbalance such as hyperkalemia, acidosis, hyperphosphatemia, hypo or hyper calcemia etc.  Acute complication includes hypotension, cramps, nausea and vomiting, headache, chest pain, back pain, itching, and fever and chills. Cardiac arrhythmia are frequently happened, including supraventricular tachycardia(most common) atrial fibrilation , ventricular arrhythmia. Dialysis catheter can cause bleeding , thrombosis and high risk for life threatening infection. In her care she will benefit from fluid and clearance stand point outweigh risk. She agrees to proceed with dialysis, provided consent. IMPRESSION:   Acute on chronic kidney injury, rising creatinine and BUN in setting of overdiuresis  CKD stage III with proteinuria, baseline creatinine around 1.5 mg per DL, history of frequent CARA  Uremia, asymptomatic  Decompensated heart failure, diastolic heart failure, improving symptoms  Diabetes  Secondary hyperparathyroidism of CKD   PLAN:   Plan for HD today or tomorrow based on availability of HD catheter. Appreciate help from IR colleague. HD with 2K bath, UF goal 1.5-2L as tolrated. Plan to continue dialysis for few treatment  and monitor for recovery  Adjust medication per current renal function status. Discussed with cardiology and hospitalist  colleague.      Facility-Administered Medications: None       Current Facility-Administered Medications   Medication Dose Route Frequency    insulin glargine (LANTUS) injection 15 Units  15 Units SubCUTAneous ACL    sodium zirconium cyclosilicate (LOKELMA) powder packet 5 g  5 g Oral DAILY    hydrALAZINE (APRESOLINE) tablet 25 mg  25 mg Oral TID    dilTIAZem ER (CARDIZEM CD) capsule 120 mg  120 mg Oral DAILY    heparin (porcine) injection 5,000 Units  5,000 Units SubCUTAneous Q8H    [Held by provider] furosemide (LASIX) tablet 40 mg  40 mg Oral DAILY    cloNIDine HCL (CATAPRES) tablet 0.1 mg  0.1 mg Oral BID    arformoteroL (BROVANA) neb solution 15 mcg  15 mcg Nebulization BID RT    budesonide (PULMICORT) 500 mcg/2 ml nebulizer suspension  500 mcg Nebulization BID RT    albuterol-ipratropium (DUO-NEB) 2.5 MG-0.5 MG/3 ML  3 mL Nebulization Q4H PRN    aspirin chewable tablet 81 mg  81 mg Oral DAILY    atorvastatin (LIPITOR) tablet 40 mg  40 mg Oral QHS    gabapentin (NEURONTIN) capsule 400 mg  400 mg Oral DAILY    sodium chloride (NS) flush 5-40 mL  5-40 mL IntraVENous Q8H    sodium chloride (NS) flush 5-40 mL  5-40 mL IntraVENous PRN    acetaminophen (TYLENOL) tablet 650 mg  650 mg Oral Q6H PRN    Or    acetaminophen (TYLENOL) suppository 650 mg  650 mg Rectal Q6H PRN    polyethylene glycol (MIRALAX) packet 17 g  17 g Oral DAILY PRN    ondansetron (ZOFRAN ODT) tablet 4 mg  4 mg Oral Q8H PRN    Or    ondansetron (ZOFRAN) injection 4 mg  4 mg IntraVENous Q6H PRN    insulin lispro (HUMALOG) injection   SubCUTAneous AC&HS    glucose chewable tablet 16 g  16 g Oral PRN    glucagon (GLUCAGEN) injection 1 mg  1 mg IntraMUSCular PRN    dextrose 10% infusion 0-250 mL  0-250 mL IntraVENous PRN    naloxone (NARCAN) injection 0.4 mg  0.4 mg IntraVENous EVERY 2 MINUTES AS NEEDED    oxyCODONE-acetaminophen (PERCOCET) 5-325 mg per tablet 1 Tablet  1 Tablet Oral Q4H PRN       Review of Systems:   As above .   Data Review:    Labs: Results:       Chemistry Recent Labs     09/16/22  0511 09/15/22  0502 09/14/22  0524   * 167* 202*   * 135* 137   K 5.6* 5.9* 5.5    106 106   CO2 22 22 22   BUN 94* 93* 80*   CREA 2.49* 2.39* 2.17*   CA 9.2 9.3 9.3   AGAP 7 7 9   BUCR 38* 39* 37*      CBC w/Diff Recent Labs     09/16/22  0511   WBC 13.9*   RBC 3.05*   HGB 8.5*   HCT 28.5*           Coagulation No results for input(s): PTP, INR, APTT, INREXT, INREXT in the last 72 hours. Iron/Ferritin No results for input(s): IRON in the last 72 hours. No lab exists for component: TIBCCALC   BNP No results for input(s): BNPP in the last 72 hours. Cardiac Enzymes No results for input(s): CPK, CKND1, GODFREY in the last 72 hours. No lab exists for component: CKRMB, TROIP   Liver Enzymes No results for input(s): TP, ALB, TBIL, AP in the last 72 hours. No lab exists for component: SGOT, GPT, DBIL   Thyroid Studies Lab Results   Component Value Date/Time    TSH 0.21 (L) 03/19/2021 04:00 AM         EKG: normal EKG, normal sinus rhythm.     Physical Assessment:   Visit Vitals  BP (!) 116/52 (BP 1 Location: Right upper arm, BP Patient Position: At rest)   Pulse 67   Temp 97.8 °F (36.6 °C)   Resp 19   Ht 5' 4\" (1.626 m)   Wt 106.6 kg (235 lb)   SpO2 98%   BMI 40.34 kg/m²     Weight change: 1.27 kg (2 lb 12.8 oz)    Intake/Output Summary (Last 24 hours) at 9/16/2022 1146  Last data filed at 9/15/2022 2152  Gross per 24 hour   Intake 600 ml   Output --   Net 600 ml     Physical Exam:   General: comfortable, no acute distress   HEENT sclera anicteric, supple neck, no thyromegaly  CVS: S1S2 heard,  no rub  RS: + air entry b/l,   Abd: Soft, Non tender, Not distended, Positive bowel sounds, no organomegaly, no CVA / supra pubic tenderness  Neuro: non focal, awake, alert , CN II-XII are grossly intact  Extrm: + edema, no cyanosis, clubbing   Skin: no visible  Rash  Musculoskeletal: No gross joints or bone deformities     Procedures/imaging: see electronic medical records for all procedures, Xrays and details which were not copied into this note but were reviewed prior to creation of Nathan Rueda MD  September 16, 2022  [facility]  Office [phone]

## 2022-09-16 NOTE — PROGRESS NOTES
Bedside and Verbal shift change report given to Baldemar Anders RN (oncoming nurse) by Janak Bergman RN (offgoing nurse). Report included the following information SBAR, Kardex, Intake/Output, MAR, Recent Results, Med Rec Status, and Cardiac Rhythm NSR . Wound Prevention Checklist    Patient: Dianelys Carter (45 y.o. female)  Date: 9/16/2022  Diagnosis: Acute respiratory failure with hypoxia (HCC) [J96.01]  CHF (congestive heart failure) ([facility] [address] [I50.9] <principal problem not specified>    Precautions: Fall       []  Heel prevention boots placed on patient    []  Patient turned q2h during shift    []  Lift team ordered    [x]  Patient on [place] bed/Specialty bed    []  Each Wound is documented during shift (Stage, Color, drainage, odor, measurements, and dressings)    [x]  Dual skin checks done at bedside during shift report with Janak Bergman, Gonzalo Álvarez RN      6112: Pt eating dinner, alert and oriented X4, denies pain or sob, on room air, VSS, shift assessment completed, bed locked and low position, call bell within reach    2143: Due medication given,  - Humalog 6 units sc given per protocol, Medicated pt for pain - SEE MAR    2345: Pt sleeping, BIPAP on, no change from previous assessment    0217: Assisted pt to bedside commode and back to bed, pt c/o neck pain, Percocet 1 tab given     0354: Sleeping comfortably, VSS    0413: Assisted pt to bedside commode, pt had medium BM    0543: Pt sleeping, call bell within reach    Bedside and Verbal shift change report given to Janak Bergman RN (oncoming nurse) by Baldemar Anders, FLORENCIA (offgoing nurse). Report included the following information SBAR, Kardex, Intake/Output, MAR, Recent Results, Med Rec Status, and Cardiac Rhythm NSR .

## 2022-09-16 NOTE — PROGRESS NOTES
CM confirmed with [employer] Rep that patient's O2 had been received via fax and is being processed for home delivery     Katerine Yang MSW, [facility]

## 2022-09-16 NOTE — CONSULTS
Surgery Consult      Patient: Lesvia Lugo MRN: [id number]  CSN: [id number]      YOB: 1950    Age: 81 y.o. Sex: female      DOA: 9/9/2022       HPI:     Lesvia Lugo is a 81 y.o. female who diagnosed with ERSD and is in the need for dialysis access catheter for dialysis treatment. Patient is awake alert and answers all questions appropriate. Moves all extremities to command. States that she has had a discussion with her nephrology doctor and understands that she needs dialysis and is willing to proceed with dialysis catheter insertion as ordered. Risk benefits and logistics all detailed both by myself and extensively by Dr. Ludwin Trejo. Patient states she understands more than willing to proceed as planned. Procedure  -insertion of temporary dialysis catheter. Written consent obtained  Left IJ catheter placed in sterile fashion with no complications. Patient tolerated the procedure without any incidents. Adequate hemostasis was obtained, catheter was placed in sterile fashion with the use of ultrasound assistance. Adequate placement noted. Ports flushed without complication. Chest x-ray ordered  Once cleared patient okay for dialysis. Patient is a pleasant 70-year-old female with a history of chronic diastolic CHF, COPD presents to the hospital secondary to shortness of breath. Patient noted to have elevated blood pressure in the emergency room, started on nitro gtt. Patient admitted to stepdown unit for further evaluation. Creatinine increased to 2.49. BUN increased to 94. K 5.6.       Past Medical History:   Diagnosis Date    Abnormal WBC count 5/17/2016    Anemia     Bilateral shoulder pain 9/27/2016    Chondromalacia of both patellae     COPD (chronic obstructive pulmonary disease) ([facility] [address] 9/2015    mild-mod dz; Dr Esteban Pride    Diabetes [facility]) 2013    Diabetic eye exam ([facility] [address] 2016    Dilated cardiomyopathy ([facility] [address]     Dyslipidemia     Gout     Heart attack ([facility] [address] 10/18/2019    History of echocardiogram 11/14/2014    Mild LVE. EF 40%. Mild, diffuse hypk. Mild LAE. Mild MR. Hypercholesteremia 1/08/14    Hypertension 2000    Noncompliance with medications 2/16/2016    Obesity     Orthostatic hypotension 5/17/2016    Osteoarthritis of both knees     Pain management 04/01/2016    Dr. Shelbi Platt     Popliteal cyst, bilateral      Stage 3b chronic kidney disease ([address] 8/8/2022    Vitamin D deficiency 10/16/14       Past Surgical History:   Procedure Laterality Date    HX HEART CATHETERIZATION      HX TUBAL LIGATION         Family History   Problem Relation Age of Onset    Diabetes Mother     Hypertension Mother     Hypertension Father     Kidney Disease Maternal Aunt 48        Dialysis       Social History     Socioeconomic History    Marital status:     Number of children: 3   Occupational History    Occupation: retired   Tobacco Use    Smoking status: Former     Packs/day: 1.00     Years: 52.00     Pack years: 52.00     Types: Cigarettes     Quit date: 5/20/2019     Years since quitting: 3.3    Smokeless tobacco: Never   Substance and Sexual Activity    Alcohol use: No     Alcohol/week: 0.0 standard drinks    Drug use: No    Sexual activity: Yes     Partners: Male   Other Topics Concern     Service No    Blood Transfusions No    Caffeine Concern No    Occupational Exposure No    Hobby Hazards No    Sleep Concern No    Stress Concern No    Weight Concern No    Special Diet No    Back Care No    Exercise No    Bike Helmet No    Seat Belt Yes    Self-Exams Yes       Prior to Admission medications    Medication Sig Start Date End Date Taking? Authorizing Provider   furosemide (LASIX) 20 mg tablet Take 1 Tablet by mouth in the morning. 8/11/22   Celine Serna, DO   olodateroL (STRIVERDI) 2.5 mcg/actuation mist Take 2 Puffs by inhalation in the morning. 8/12/22   Celine Serna, DO   hydrALAZINE (APRESOLINE) 100 mg tablet Take 100 mg by mouth three (3) times daily.     Provider, Historical   gabapentin (NEURONTIN) 400 mg capsule Take 400 mg by mouth in the morning. Other, MD Tg   tiZANidine (ZANAFLEX) 4 mg tablet Take 4 mg by mouth two (2) times daily as needed for Muscle Spasm(s). Provider, Historical   calcitRIOL (ROCALTROL) 0.25 mcg capsule Take 1 Cap by mouth daily. 3/24/21   Roberto Cade MD   albuterol-ipratropium (DUO-NEB) 2.5 mg-0.5 mg/3 ml nebu 3 mL by Nebulization route every six (6) hours as needed for Wheezing. 3/23/21   Roberto Cade MD   atorvastatin (LIPITOR) 80 mg tablet Take 1 Tab by mouth daily. 1/25/21   Basilio Mendenhall MD   carvediloL (COREG) 25 mg tablet Take 1 Tab by mouth two (2) times daily (with meals). 1/25/21   Basilio Mendenhall MD   aspirin 81 mg chewable tablet Take 2 Tabs by mouth daily. 7/19/19   Kendra Rabago NP   insulin detemir (LEVEMIR) 100 unit/mL injection 15 units daily for diabetes 7/20/17   [facility] DNP   umeclidinium-vilanterol [facility] ELLIP) 62.5-25 mcg/actuation inhaler Take 1 Puff by inhalation daily. For COPD 4/26/17   Georgia Russo DNP       No Known Allergies    Review of Systems  Review of Systems -   General: negative for fever/chills. Positive for generalized malaise and weakness. Eyes: negative for vision loss   HENT: negative for cold symptoms   Respiratory Positive for shortness of breath and dyspnea on exertion. Improvement with hospitalization.    Cardiac: negative for chest pain   Vascular negative for foot pain at night    Gastrointestinal: negative for abdominal pain   Genitourinary: negative for dysuria    Endocrine: negative for excessive thirst   Skin: negative for rash   Neurological: negative for paralysis   Psychiatric: negative for depression          Physical Exam:      Visit Vitals  BP (!) 130/53 (BP 1 Location: Right upper arm, BP Patient Position: At rest)   Pulse 71   Temp 97.9 °F (36.6 °C)   Resp 20   Ht 5' 4\" (1.626 m)   Wt 235 lb (106.6 kg)   SpO2 95% BMI 40.34 kg/m²       Physical Exam:  General:  Alert, awake oriented x4. Found sitting up on edge of bed, speaking in full sentences on supplemental oxygen. HEENT normocephalic atraumatic pupils equally round reactive to light oropharynx clear  Pulmonary: improved air entry. No wheeze. No crackles. Cardiovascular: Regular rate and Rhythm. GI:  Soft, Non distended, Non tender. Normoactive bowel sounds. Extremities:  No edema, cyanosis, clubbing. No calf tenderness. Neurologic: Alert and oriented X 4. No acute neuro deficits. Additional: No rash to visible skin. Data Review:  CBC:   Lab Results   Component Value Date/Time    WBC 13.9 (H) 09/16/2022 05:11 AM    RBC 3.05 (L) 09/16/2022 05:11 AM    HGB 8.5 (L) 09/16/2022 05:11 AM    HCT 28.5 (L) 09/16/2022 05:11 AM    PLATELET 136 08/64/0411 05:11 AM      BMP:   Lab Results   Component Value Date/Time    Glucose 105 (H) 09/16/2022 05:11 AM    Sodium 135 (L) 09/16/2022 05:11 AM    Potassium 5.6 (H) 09/16/2022 05:11 AM    Chloride 106 09/16/2022 05:11 AM    CO2 22 09/16/2022 05:11 AM    BUN 94 (H) 09/16/2022 05:11 AM    Creatinine 2.49 (H) 09/16/2022 05:11 AM    Calcium 9.2 09/16/2022 05:11 AM     Coagulation:   Lab Results   Component Value Date/Time    Prothrombin time 13.5 09/09/2022 07:54 AM    INR 1.0 09/09/2022 07:54 AM    aPTT 35.8 03/19/2021 04:00 AM         Assessment/Plan     Continue with maximal medical management  Procedure  -insertion of temporary dialysis catheter. Written consent obtained  Left IJ catheter placed in sterile fashion with no complications. Patient tolerated the procedure without any incidents. Adequate hemostasis was obtained, catheter was placed in sterile fashion with the use of ultrasound assistance. Adequate placement noted. Ports flushed without complication. Chest x-ray ordered  Once cleared patient okay for dialysis. Okay for the patient to be started on an diet.   Spoke with nephrology and hospitalist, instructed him dialysis catheter is in place. Patient can be scheduled next week for crossover to a [facility] if needs be. Thank you very much for the opportunity to participate in the care of this patient. Should you have any further questions or concerns, please do not hesitate to call back.      Toshia Valdez PA-C     Active Problems:    Acute respiratory failure with hypoxia ([facility] [address] (10/18/2019)      CHF (congestive heart failure) ([facility] [address] (9/9/2022)        Jaxon Gaines PA-C  September 16, 2022

## 2022-09-16 NOTE — CONSULTS
Interventional Radiology    Consulted for a [facility] placement on Ms. Marcus Wood  Unfortunately we do not have time to accommodate today - discussed with Dr. Benito Barragan    Vascular colleagues will be consulted to assist with temporary v tunneled dialysis catheter placement prior to Monday per Dr. Kaia De La Vega - thank you so much for your help.     Thank you,  Blanca Dunn, [address]

## 2022-09-16 NOTE — PROGRESS NOTES
Cardiology Progress Note    Admit Date: 9/9/2022  Attending Cardiologist: Dr. Roxanna Shi Problems    Diagnosis Date Noted    CHF (congestive heart failure) ([facility] [address] 09/09/2022    Acute respiratory failure with hypoxia ([facility] [address] 10/18/2019      -Acute hypoxic respiratory failure in setting of below, s/p bipap. Improved. -Flash pulmonary edema in setting of severely elevated blood pressures.   -Chest pain, atypical with pleuritic component. MI r/o with serial negative cardiac enzymes. No acute ischemic changes on ECG. -Hypertensive crisis, Max /121 mmHg. Improved. -a/c HFpEF. Last Echo in June 2022 with normal LVEF. Renal duplex with Elevated velocities noted within the left renal artery origin. -CAD, s/p prox LAD PCI/YADIRA (2018); cardiac cath 10/2019 with findings as follows. LAD: Previously placed ostial to proximal LAD stent is widely patent. D1 with ostial 50% stenosis. -CKD  -Anemia  -HTN  -DM  -Hypercholesterolemia, on statin  -COPD        Primary cardiologist is Dr. Nicole Rosales:     Continue to hold diuretics with worsening renal function. Nephrology following, appreciate assistance. Hyperkalemia mgmt per primary team.   Continued on cardizem, hydralazine and clonidine  Continue ASA and statin for CAD. Increase activity as tolerated. Diuretics are held due to worsening renal function but patient is feeling significantly better as she has no orthopnea and lungs are clear. Hemodialysis being planned by renal.  Hopefully she will have recovery of her renal function and dialysis can be transient. Subjective:     No new complaints.      Objective:      Patient Vitals for the past 8 hrs:   Temp Pulse Resp BP SpO2   09/16/22 0925 -- -- -- -- 97 %   09/16/22 0804 97.2 °F (36.2 °C) 67 18 123/63 97 %   09/16/22 0421 98 °F (36.7 °C) 65 11 135/80 98 %           Patient Vitals for the past 96 hrs:   Weight   09/16/22 0421 106.6 kg (235 lb)   09/15/22 0400 105.3 kg (232 lb 3.2 oz)   09/13/22 1000 104 kg (229 lb 4.8 oz)         TELE: SR               Current Facility-Administered Medications   Medication Dose Route Frequency Last Admin    insulin glargine (LANTUS) injection 15 Units  15 Units SubCUTAneous ACL      sodium zirconium cyclosilicate (LOKELMA) powder packet 5 g  5 g Oral DAILY 5 g at 09/16/22 1017    hydrALAZINE (APRESOLINE) tablet 25 mg  25 mg Oral TID 25 mg at 09/16/22 1005    dilTIAZem ER (CARDIZEM CD) capsule 120 mg  120 mg Oral DAILY 120 mg at 09/16/22 1005    heparin (porcine) injection 5,000 Units  5,000 Units SubCUTAneous Q8H      [Held by provider] furosemide (LASIX) tablet 40 mg  40 mg Oral DAILY      cloNIDine HCL (CATAPRES) tablet 0.1 mg  0.1 mg Oral BID 0.1 mg at 09/16/22 1005    arformoteroL (BROVANA) neb solution 15 mcg  15 mcg Nebulization BID RT 15 mcg at 09/16/22 0923    budesonide (PULMICORT) 500 mcg/2 ml nebulizer suspension  500 mcg Nebulization BID  mcg at 09/16/22 0923    albuterol-ipratropium (DUO-NEB) 2.5 MG-0.5 MG/3 ML  3 mL Nebulization Q4H PRN      aspirin chewable tablet 81 mg  81 mg Oral DAILY 81 mg at 09/16/22 1005    atorvastatin (LIPITOR) tablet 40 mg  40 mg Oral QHS 40 mg at 09/15/22 2153    gabapentin (NEURONTIN) capsule 400 mg  400 mg Oral DAILY 400 mg at 09/16/22 1005    sodium chloride (NS) flush 5-40 mL  5-40 mL IntraVENous Q8H 10 mL at 09/16/22 0551    sodium chloride (NS) flush 5-40 mL  5-40 mL IntraVENous PRN      acetaminophen (TYLENOL) tablet 650 mg  650 mg Oral Q6H  mg at 09/09/22 2002    Or    acetaminophen (TYLENOL) suppository 650 mg  650 mg Rectal Q6H PRN      polyethylene glycol (MIRALAX) packet 17 g  17 g Oral DAILY PRN      ondansetron (ZOFRAN ODT) tablet 4 mg  4 mg Oral Q8H PRN      Or    ondansetron (ZOFRAN) injection 4 mg  4 mg IntraVENous Q6H PRN      insulin lispro (HUMALOG) injection   SubCUTAneous AC&HS 6 Units at 09/15/22 3254    glucose chewable tablet 16 g  16 g Oral PRN      glucagon (GLUCAGEN) injection 1 mg  1 mg IntraMUSCular PRN      dextrose 10% infusion 0-250 mL  0-250 mL IntraVENous PRN      naloxone (NARCAN) injection 0.4 mg  0.4 mg IntraVENous EVERY 2 MINUTES AS NEEDED      oxyCODONE-acetaminophen (PERCOCET) 5-325 mg per tablet 1 Tablet  1 Tablet Oral Q4H PRN 1 Tablet at 09/16/22 1005         Intake/Output Summary (Last 24 hours) at 9/16/2022 1100  Last data filed at 9/15/2022 2152  Gross per 24 hour   Intake 600 ml   Output --   Net 600 ml         Physical Exam:  General:  alert, cooperative, no distress, appears stated age  Neck:  no JVD  Lungs: Clear without any rales or rhonchi  Heart:  regular rate and rhythm  Abdomen:  abdomen is soft without significant tenderness, masses, organomegaly or guarding  Extremities:  extremities normal, atraumatic, no cyanosis or edema    Visit Vitals  /63 (BP 1 Location: Right upper arm, BP Patient Position: At rest)   Pulse 67   Temp 97.2 °F (36.2 °C)   Resp 18   Ht 5' 4\" (1.626 m)   Wt 106.6 kg (235 lb)   SpO2 97%   BMI 40.34 kg/m²       Data Review:     Labs: Results:       Chemistry Recent Labs     09/16/22  0511 09/15/22  0502 09/14/22  0524   * 167* 202*   * 135* 137   K 5.6* 5.9* 5.5    106 106   CO2 22 22 22   BUN 94* 93* 80*   CREA 2.49* 2.39* 2.17*   CA 9.2 9.3 9.3   AGAP 7 7 9   BUCR 38* 39* 37*        CBC w/Diff Recent Labs     09/16/22  0511   WBC 13.9*   RBC 3.05*   HGB 8.5*   HCT 28.5*           Cardiac Enzymes No results found for: CPK, CK, CKMMB, CKMB, RCK3, CKMBT, CKNDX, CKND1, GODFREY, TROPT, TROIQ, SANG, TROPT, TNIPOC, BNP, BNPP   Coagulation No results for input(s): PTP, INR, APTT, INREXT, INREXT in the last 72 hours.       Lipid Panel Lab Results   Component Value Date/Time    Cholesterol, total 152 10/10/2019 12:00 AM    HDL Cholesterol 46 10/10/2019 12:00 AM    LDL, calculated 86 10/10/2019 12:00 AM    VLDL, calculated 20 10/10/2019 12:00 AM    Triglyceride 101 10/10/2019 12:00 AM    CHOL/HDL Ratio 5.6 (H) 05/21/2018 06:23 AM      BNP No results found for: BNP, BNPP, XBNPT   Liver Enzymes No results for input(s): TP, ALB, TBIL, AP in the last 72 hours. No lab exists for component: SGOT, GPT, DBIL     Thyroid Studies Lab Results   Component Value Date/Time    TSH 0.21 (L) 03/19/2021 04:00 AM          Signed By: Reynaldo Lewis PA-C     September 16, 2022       I have personally and independently evaluated and examined the patient. All relevant labs and testing data are reviewed. I have personally reviewed all the diagnostic labs, available EKG imaging x-rays and other diagnostic data and incorporated them into my care. I am referencing APC's note. I participated in medical decision making. Care plan discussed and updated after review. More than 50% time spent reviewing data, examining patient and recommending assessment and plan.   Chau Callaway MD

## 2022-09-17 LAB
ANION GAP SERPL CALC-SCNC: 5 MMOL/L (ref 3–18)
BASOPHILS # BLD: 0 K/UL (ref 0–0.1)
BASOPHILS NFR BLD: 0 % (ref 0–2)
BUN SERPL-MCNC: 78 MG/DL (ref 7–18)
BUN/CREAT SERPL: 44 (ref 12–20)
CALCIUM SERPL-MCNC: 9.3 MG/DL (ref 8.5–10.1)
CHLORIDE SERPL-SCNC: 108 MMOL/L (ref 100–111)
CO2 SERPL-SCNC: 23 MMOL/L (ref 21–32)
CREAT SERPL-MCNC: 1.78 MG/DL (ref 0.6–1.3)
DIFFERENTIAL METHOD BLD: ABNORMAL
EOSINOPHIL # BLD: 0 K/UL (ref 0–0.4)
EOSINOPHIL NFR BLD: 0 % (ref 0–5)
ERYTHROCYTE [DISTWIDTH] IN BLOOD BY AUTOMATED COUNT: 17.1 % (ref 11.6–14.5)
GLUCOSE BLD STRIP.AUTO-MCNC: 104 MG/DL (ref 70–110)
GLUCOSE BLD STRIP.AUTO-MCNC: 146 MG/DL (ref 70–110)
GLUCOSE BLD STRIP.AUTO-MCNC: 154 MG/DL (ref 70–110)
GLUCOSE BLD STRIP.AUTO-MCNC: 219 MG/DL (ref 70–110)
GLUCOSE SERPL-MCNC: 139 MG/DL (ref 74–99)
HCT VFR BLD AUTO: 28.9 % (ref 35–45)
HGB BLD-MCNC: 8.8 G/DL (ref 12–16)
IMM GRANULOCYTES # BLD AUTO: 0.4 K/UL (ref 0–0.04)
IMM GRANULOCYTES NFR BLD AUTO: 4 % (ref 0–0.5)
LYMPHOCYTES # BLD: 1.2 K/UL (ref 0.9–3.6)
LYMPHOCYTES NFR BLD: 11 % (ref 21–52)
MCH RBC QN AUTO: 29 PG (ref 24–34)
MCHC RBC AUTO-ENTMCNC: 30.4 G/DL (ref 31–37)
MCV RBC AUTO: 95.4 FL (ref 78–100)
MONOCYTES # BLD: 1 K/UL (ref 0.05–1.2)
MONOCYTES NFR BLD: 9 % (ref 3–10)
NEUTS SEG # BLD: 8.6 K/UL (ref 1.8–8)
NEUTS SEG NFR BLD: 77 % (ref 40–73)
NRBC # BLD: 0.15 K/UL (ref 0–0.01)
NRBC BLD-RTO: 1.3 PER 100 WBC
PLATELET # BLD AUTO: 304 K/UL (ref 135–420)
PMV BLD AUTO: 10.5 FL (ref 9.2–11.8)
POTASSIUM SERPL-SCNC: 5.7 MMOL/L (ref 3.5–5.5)
RBC # BLD AUTO: 3.03 M/UL (ref 4.2–5.3)
SODIUM SERPL-SCNC: 136 MMOL/L (ref 136–145)
WBC # BLD AUTO: 11.2 K/UL (ref 4.6–13.2)

## 2022-09-17 PROCEDURE — 99232 SBSQ HOSP IP/OBS MODERATE 35: CPT | Performed by: HOSPITALIST

## 2022-09-17 PROCEDURE — 74011636637 HC RX REV CODE- 636/637: Performed by: HOSPITALIST

## 2022-09-17 PROCEDURE — 74011250637 HC RX REV CODE- 250/637: Performed by: INTERNAL MEDICINE

## 2022-09-17 PROCEDURE — 65270000046 HC RM TELEMETRY

## 2022-09-17 PROCEDURE — 94640 AIRWAY INHALATION TREATMENT: CPT

## 2022-09-17 PROCEDURE — 74011250637 HC RX REV CODE- 250/637: Performed by: PHYSICIAN ASSISTANT

## 2022-09-17 PROCEDURE — 90935 HEMODIALYSIS ONE EVALUATION: CPT

## 2022-09-17 PROCEDURE — 74011250636 HC RX REV CODE- 250/636: Performed by: HOSPITALIST

## 2022-09-17 PROCEDURE — 85025 COMPLETE CBC W/AUTO DIFF WBC: CPT

## 2022-09-17 PROCEDURE — 80048 BASIC METABOLIC PNL TOTAL CA: CPT

## 2022-09-17 PROCEDURE — 94761 N-INVAS EAR/PLS OXIMETRY MLT: CPT

## 2022-09-17 PROCEDURE — 82962 GLUCOSE BLOOD TEST: CPT

## 2022-09-17 PROCEDURE — 74011000250 HC RX REV CODE- 250: Performed by: INTERNAL MEDICINE

## 2022-09-17 PROCEDURE — 99233 SBSQ HOSP IP/OBS HIGH 50: CPT | Performed by: INTERNAL MEDICINE

## 2022-09-17 PROCEDURE — 36415 COLL VENOUS BLD VENIPUNCTURE: CPT

## 2022-09-17 PROCEDURE — 5A1D70Z PERFORMANCE OF URINARY FILTRATION, INTERMITTENT, LESS THAN 6 HOURS PER DAY: ICD-10-PCS | Performed by: INTERNAL MEDICINE

## 2022-09-17 RX ADMIN — SODIUM CHLORIDE, PRESERVATIVE FREE 10 ML: 5 INJECTION INTRAVENOUS at 21:56

## 2022-09-17 RX ADMIN — SODIUM CHLORIDE, PRESERVATIVE FREE 10 ML: 5 INJECTION INTRAVENOUS at 17:23

## 2022-09-17 RX ADMIN — ASPIRIN 81 MG CHEWABLE TABLET 81 MG: 81 TABLET CHEWABLE at 09:27

## 2022-09-17 RX ADMIN — OXYCODONE HYDROCHLORIDE AND ACETAMINOPHEN 1 TABLET: 5; 325 TABLET ORAL at 21:52

## 2022-09-17 RX ADMIN — OXYCODONE HYDROCHLORIDE AND ACETAMINOPHEN 1 TABLET: 5; 325 TABLET ORAL at 17:19

## 2022-09-17 RX ADMIN — ARFORMOTEROL TARTRATE 15 MCG: 15 SOLUTION RESPIRATORY (INHALATION) at 20:01

## 2022-09-17 RX ADMIN — Medication 15 UNITS: at 17:20

## 2022-09-17 RX ADMIN — OXYCODONE HYDROCHLORIDE AND ACETAMINOPHEN 1 TABLET: 5; 325 TABLET ORAL at 02:17

## 2022-09-17 RX ADMIN — GABAPENTIN 400 MG: 400 CAPSULE ORAL at 09:27

## 2022-09-17 RX ADMIN — CLONIDINE HYDROCHLORIDE 0.1 MG: 0.1 TABLET ORAL at 09:27

## 2022-09-17 RX ADMIN — Medication 6 UNITS: at 21:50

## 2022-09-17 RX ADMIN — Medication 2 UNITS: at 17:20

## 2022-09-17 RX ADMIN — ATORVASTATIN CALCIUM 40 MG: 40 TABLET, FILM COATED ORAL at 21:52

## 2022-09-17 RX ADMIN — HYDRALAZINE HYDROCHLORIDE 10 MG: 10 TABLET, FILM COATED ORAL at 21:52

## 2022-09-17 RX ADMIN — CLONIDINE HYDROCHLORIDE 0.1 MG: 0.1 TABLET ORAL at 17:20

## 2022-09-17 RX ADMIN — ARFORMOTEROL TARTRATE 15 MCG: 15 SOLUTION RESPIRATORY (INHALATION) at 08:56

## 2022-09-17 RX ADMIN — SODIUM ZIRCONIUM CYCLOSILICATE 5 G: 5 POWDER, FOR SUSPENSION ORAL at 09:26

## 2022-09-17 RX ADMIN — SODIUM CHLORIDE, PRESERVATIVE FREE 10 ML: 5 INJECTION INTRAVENOUS at 05:35

## 2022-09-17 RX ADMIN — BUDESONIDE 500 MCG: 0.5 SUSPENSION RESPIRATORY (INHALATION) at 20:01

## 2022-09-17 RX ADMIN — OXYCODONE HYDROCHLORIDE AND ACETAMINOPHEN 1 TABLET: 5; 325 TABLET ORAL at 09:31

## 2022-09-17 RX ADMIN — HYDRALAZINE HYDROCHLORIDE 10 MG: 10 TABLET, FILM COATED ORAL at 17:27

## 2022-09-17 RX ADMIN — BUDESONIDE 500 MCG: 0.5 SUSPENSION RESPIRATORY (INHALATION) at 08:56

## 2022-09-17 RX ADMIN — HYDRALAZINE HYDROCHLORIDE 10 MG: 10 TABLET, FILM COATED ORAL at 09:27

## 2022-09-17 RX ADMIN — DILTIAZEM HYDROCHLORIDE 120 MG: 120 CAPSULE, COATED, EXTENDED RELEASE ORAL at 09:26

## 2022-09-17 NOTE — PROGRESS NOTES
Progress Note      29-year-old female with past medical history of hypertension, congestive heart failure, admitted for decompensated heart failure, following for renal failure  Subjective      Overnight event noted  Seen and examined on HD today  Doppler renal artery shows higher PSV at left renal artery origin  Chronic atherosclerotic changes     IMPRESSION:   Acute on chronic kidney injury, rising creatinine and BUN in setting of overdiuresis  CKD stage III with proteinuria, baseline creatinine around 1.5 mg per DL, history of frequent CARA  Uremia  Decompensated heart failure, diastolic heart failure, improving symptoms  Diabetes  Secondary hyperparathyroidism of CKD   PLAN:   HD ongoing. Tolerating ok. Goal UF is 1.5 L. 2k bath  Adjust medication per current renal function status. Discussed with cardiology colleague.      Facility-Administered Medications: None       Current Facility-Administered Medications   Medication Dose Route Frequency    insulin glargine (LANTUS) injection 15 Units  15 Units SubCUTAneous ACL    0.9% sodium chloride infusion 250 mL  250 mL IntraVENous DIALYSIS PRN    hydrALAZINE (APRESOLINE) tablet 10 mg  10 mg Oral TID    sodium zirconium cyclosilicate (LOKELMA) powder packet 5 g  5 g Oral DAILY    dilTIAZem ER (CARDIZEM CD) capsule 120 mg  120 mg Oral DAILY    heparin (porcine) injection 5,000 Units  5,000 Units SubCUTAneous Q8H    [Held by provider] furosemide (LASIX) tablet 40 mg  40 mg Oral DAILY    cloNIDine HCL (CATAPRES) tablet 0.1 mg  0.1 mg Oral BID    arformoteroL (BROVANA) neb solution 15 mcg  15 mcg Nebulization BID RT    budesonide (PULMICORT) 500 mcg/2 ml nebulizer suspension  500 mcg Nebulization BID RT    albuterol-ipratropium (DUO-NEB) 2.5 MG-0.5 MG/3 ML  3 mL Nebulization Q4H PRN    aspirin chewable tablet 81 mg  81 mg Oral DAILY    atorvastatin (LIPITOR) tablet 40 mg  40 mg Oral QHS    gabapentin (NEURONTIN) capsule 400 mg  400 mg Oral DAILY    sodium chloride (NS) flush 5-40 mL  5-40 mL IntraVENous Q8H    sodium chloride (NS) flush 5-40 mL  5-40 mL IntraVENous PRN    acetaminophen (TYLENOL) tablet 650 mg  650 mg Oral Q6H PRN    Or    acetaminophen (TYLENOL) suppository 650 mg  650 mg Rectal Q6H PRN    polyethylene glycol (MIRALAX) packet 17 g  17 g Oral DAILY PRN    ondansetron (ZOFRAN ODT) tablet 4 mg  4 mg Oral Q8H PRN    Or    ondansetron (ZOFRAN) injection 4 mg  4 mg IntraVENous Q6H PRN    insulin lispro (HUMALOG) injection   SubCUTAneous AC&HS    glucose chewable tablet 16 g  16 g Oral PRN    glucagon (GLUCAGEN) injection 1 mg  1 mg IntraMUSCular PRN    dextrose 10% infusion 0-250 mL  0-250 mL IntraVENous PRN    naloxone (NARCAN) injection 0.4 mg  0.4 mg IntraVENous EVERY 2 MINUTES AS NEEDED    oxyCODONE-acetaminophen (PERCOCET) 5-325 mg per tablet 1 Tablet  1 Tablet Oral Q4H PRN       Review of Systems:   As above . Data Review:    Labs: Results:       Chemistry Recent Labs     09/17/22  0548 09/16/22  0511 09/15/22  0502   * 105* 167*    135* 135*   K 5.7* 5.6* 5.9*    106 106   CO2 23 22 22   BUN 78* 94* 93*   CREA 1.78* 2.49* 2.39*   CA 9.3 9.2 9.3   AGAP 5 7 7   BUCR 44* 38* 39*        CBC w/Diff Recent Labs     09/17/22  0548 09/16/22  0511   WBC 11.2 13.9*   RBC 3.03* 3.05*   HGB 8.8* 8.5*   HCT 28.9* 28.5*    337   GRANS 77*  --    LYMPH 11*  --    EOS 0  --         Coagulation No results for input(s): PTP, INR, APTT, INREXT, INREXT in the last 72 hours. Iron/Ferritin No results for input(s): IRON in the last 72 hours. No lab exists for component: TIBCCALC   BNP No results for input(s): BNPP in the last 72 hours. Cardiac Enzymes No results for input(s): CPK, CKND1, GODFREY in the last 72 hours. No lab exists for component: CKRMB, TROIP   Liver Enzymes No results for input(s): TP, ALB, TBIL, AP in the last 72 hours.     No lab exists for component: SGOT, GPT, DBIL   Thyroid Studies Lab Results   Component Value Date/Time    TSH 0.21 (L) 03/19/2021 04:00 AM         EKG: normal EKG, normal sinus rhythm.     Physical Assessment:   Visit Vitals  BP (!) 161/66   Pulse 62   Temp 98 °F (36.7 °C) (Oral)   Resp 18   Ht 5' 4\" (1.626 m)   Wt 103.5 kg (228 lb 1.6 oz)   SpO2 95%   BMI 39.15 kg/m²     Weight change: -3.13 kg (-6 lb 14.4 oz)    Intake/Output Summary (Last 24 hours) at 9/17/2022 1133  Last data filed at 9/17/2022 1010  Gross per 24 hour   Intake 360 ml   Output 500 ml   Net -140 ml       Physical Exam:   General: comfortable, no acute distress   HEENT sclera anicteric, supple neck, no thyromegaly  CVS: S1S2 heard,  no rub  RS: + air entry b/l,   Abd: Soft, Non tender, Not distended, Positive bowel sounds, no organomegaly, no CVA / supra pubic tenderness  Neuro: non focal, awake, alert , CN II-XII are grossly intact  Extrm: + edema, no cyanosis, clubbing   Skin: no visible  Rash  Musculoskeletal: No gross joints or bone deformities     Procedures/imaging: see electronic medical records for all procedures, Xrays and details which were not copied into this note but were reviewed prior to creation of Kailash Waller MD  September 17, 2022  [facility]  Office [phone]

## 2022-09-17 NOTE — PROGRESS NOTES
[facility]  Progress Note    Patient: Oswaldo Rodriguez Age: 81 y.o. : 1950 MR#: [id number] SSN: [id number]  Date/Time: 2022     Subjective:     29-year-old female with a history of chronic diastolic CHF, COPD presents to the hospital secondary to shortness of breath. Patient noted to have elevated blood pressure in the emergency room, started on nitro gtt. Patient admitted to stepdown unit for further evaluation. Patient seen and examined at bedside, she has just returned from dialysis. Patient reports she had no issues, and she feels better. Her breathing is better. Assessment/Plan:     Hypertensive emergency-blood pressure improving, off nitro gtt. , continue hydralazine, Coreg, amlodipine, catapres. Avoid ACE ARB given CARA. Diuretic held at this time. Acute on chronic diastolic CHF exacerbation. Diuretics held. DM2 with steroid-induced hyperglycemia improving. decrease Lantus. SSI. Diabetes management follows. Mild to moderate COPD with ? exacerbation. Stop steroids and monitor. Bronchodilators. RT for bronchial hygiene protocol. Pulmonology input appreciated. Obesity Body mass index is 39.15 kg/m². CARA on ckd3. Initiation of dialysis 2022, access placed 2022. Jayjay Ramirez Continue to monitor renal indices, urine output. Nephrology input appreciated. Right basilar infiltrate likely asymmetric edema. Postmenopausal vaginal bleeding. Close outpatient follow-up with OB/GYN. 9. PFT in 2017 consistent with restrictive lung disease  10. Former tobacco.  6. Prior levi diagnosis. Bipap qHS and prn. Repeat sleep studies w/ Dr. Clarence Srivastava. 12. Patient meets criteria for low dose CT screen. 13. Suprapubic pain. UA negative. 14. Hyperkalemia. [place] [facility]. Dialysis started today. Nephrology input appreciated. 6.  15. DVT prophylaxis with heparin subcut 3 times daily  16. Full code. Hh ordered. Patient did not qualify for home oxygen 2022. I discussed case with Dr. Tye Kanner. Ji Pimentel MD  22      Case discussed with:  [x]Patient  []Family  [x]Nursing  []Case Management  DVT Prophylaxis:  [x]Lovenox  []Hep SQ  []SCDs  []Coumadin   []On Heparin gtt    Objective:   VS: Visit Vitals  /61   Pulse 66   Temp 96.8 °F (36 °C) (Temporal)   Resp 18   Ht 5' 4\" (1.626 m)   Wt 103.5 kg (228 lb 1.6 oz)   SpO2 95%   BMI 39.15 kg/m²        Tmax/24hrs: Temp (24hrs), Av.8 °F (36.6 °C), Min:96.8 °F (36 °C), Max:98.4 °F (36.9 °C)  IOBRIEF  Intake/Output Summary (Last 24 hours) at 2022 1432  Last data filed at 2022 1408  Gross per 24 hour   Intake 360 ml   Output 2000 ml   Net -1640 ml       General:  Alert, awake oriented x4. Lying in bed no acute distress. HEENT normocephalic atraumatic pupils equally round reactive to light oropharynx clear  Pulmonary: improved air entry. No wheeze. No crackles. Cardiovascular: Regular rate and Rhythm. GI:  Soft, Non distended, Non tender. Normoactive bowel sounds. Extremities:  No edema, cyanosis, clubbing. No calf tenderness. Neurologic: Alert and oriented X 4. No acute neuro deficits. Additional: No rash to visible skin.     Medications:   Current Facility-Administered Medications   Medication Dose Route Frequency    insulin glargine (LANTUS) injection 15 Units  15 Units SubCUTAneous ACL    0.9% sodium chloride infusion 250 mL  250 mL IntraVENous DIALYSIS PRN    hydrALAZINE (APRESOLINE) tablet 10 mg  10 mg Oral TID    sodium zirconium cyclosilicate (LOKELMA) powder packet 5 g  5 g Oral DAILY    dilTIAZem ER (CARDIZEM CD) capsule 120 mg  120 mg Oral DAILY    heparin (porcine) injection 5,000 Units  5,000 Units SubCUTAneous Q8H    [Held by provider] furosemide (LASIX) tablet 40 mg  40 mg Oral DAILY    cloNIDine HCL (CATAPRES) tablet 0.1 mg  0.1 mg Oral BID    arformoteroL (BROVANA) neb solution 15 mcg  15 mcg Nebulization BID RT    budesonide (PULMICORT) 500 mcg/2 ml nebulizer suspension  500 mcg Nebulization BID RT    albuterol-ipratropium (DUO-NEB) 2.5 MG-0.5 MG/3 ML  3 mL Nebulization Q4H PRN    aspirin chewable tablet 81 mg  81 mg Oral DAILY    atorvastatin (LIPITOR) tablet 40 mg  40 mg Oral QHS    gabapentin (NEURONTIN) capsule 400 mg  400 mg Oral DAILY    sodium chloride (NS) flush 5-40 mL  5-40 mL IntraVENous Q8H    sodium chloride (NS) flush 5-40 mL  5-40 mL IntraVENous PRN    acetaminophen (TYLENOL) tablet 650 mg  650 mg Oral Q6H PRN    Or    acetaminophen (TYLENOL) suppository 650 mg  650 mg Rectal Q6H PRN    polyethylene glycol (MIRALAX) packet 17 g  17 g Oral DAILY PRN    ondansetron (ZOFRAN ODT) tablet 4 mg  4 mg Oral Q8H PRN    Or    ondansetron (ZOFRAN) injection 4 mg  4 mg IntraVENous Q6H PRN    insulin lispro (HUMALOG) injection   SubCUTAneous AC&HS    glucose chewable tablet 16 g  16 g Oral PRN    glucagon (GLUCAGEN) injection 1 mg  1 mg IntraMUSCular PRN    dextrose 10% infusion 0-250 mL  0-250 mL IntraVENous PRN    naloxone (NARCAN) injection 0.4 mg  0.4 mg IntraVENous EVERY 2 MINUTES AS NEEDED    oxyCODONE-acetaminophen (PERCOCET) 5-325 mg per tablet 1 Tablet  1 Tablet Oral Q4H PRN       Imaging:   XR Results (most recent):  Results from Hospital Encounter encounter on 09/09/22    XR CHEST PORT    Narrative  EXAM: XR CHEST PORT    CLINICAL INDICATION/HISTORY: 81 years Female. S/P Dialysis catheter insertion. Additional History: None    TECHNIQUE: Frontal view of the chest    COMPARISON: 9/12/2022    FINDINGS:    Underpenetration of the lower chest secondary to body habitus. Interval placement of a right IJ approach dual-lumen vascular catheter. The tip  is approximately 5.5 cm distal to the cavoatrial junction. The cardiac silhouette is mild to moderately enlarged increase in size from  prior. Moderate pulmonary vascular congestion and diffuse bilateral interstitial  opacities, increased from prior. Aortic calcifications noted.     No definite evidence of pleural effusion or pneumothorax. No acute osseous abnormality appreciated. Impression  Interval dialysis catheter placement, with tip approximately 5.5 cm distal to  the cavoatrial junction; consider retracting to this level. No pneumothorax  detected. Interval enlargement cardiac silhouette enlargement. Interval increased moderate  pulmonary vascular congestion with diffuse bilateral interstitial edema versus  infiltrates. CT Results (most recent):  Results from Hospital Encounter encounter on 08/08/22    CTA CHEST W OR W WO CONT    Narrative  CT CHEST PULMONARY EMBOLISM PROTOCOL    CPT code: 17890    INDICATION: Dyspnea. Covid positive. Question pulmonary embolism. TECHNIQUE: 1.5XM collimation helical images obtained through the level of the  pulmonary arteries with additional imaging through the chest following the  uneventful administration of 72 cc's nonionic intravenous contrast.  - Images reconstructed into three dimensional coronal and sagittal projections  for complete evaluation of the tortuous and overlapping pulmonary vascular  structures and to reduce patient radiation dose. All CT scans at this facility are performed using dose optimization technique as  appropriate to this specific exam, to include automated exposure control,  adjustment of the mA and/or KP according to patient size or use of iterative  reconstruction techniques. COMPARISON: Prior CT 7/8/2022    FINDINGS:  Opacification of the pulmonary arteries is adequate. No filling defects are appreciated within the main, left, right, lobar or  visualized segmental pulmonary arteries to suggest embolism. Main pulmonary artery is enlarged, measuring up to 3.8 cm diameter, similar to  the prior exam. Findings can be seen in setting of pulmonary artery  hypertension. Heart size top normal. No pericardial effusion. Nonenlarged aorta. Atherosclerotic calcification in the arch and at level of  proximal arch branch vessels.     Geographic areas of mildly increased groundglass attenuation in the right upper  lobe fairly diffuse, without consolidation. Less extensive changes in the  superior aspect of the right middle lobe. Similar findings in the superior  segment right lower lobe and ventral to central right lower lobe. -Dependent peripheral atelectasis/consolidation subsegmental right lower lobe  adjacent to a small effusion. Similar appearing subsegmental dependent atelectasis left lower lobe adjacent to  a trace effusion. Mild degree atherosclerotic narrowing of the proximal SMA. Impression  1. No CT evidence for central pulmonary embolism.  -Similar enlargement of central pulmonary arteries suggest pulmonary artery  hypertension. 2. Multifocal geographic and peribronchial groundglass airspace infiltrates more  significant in the right lung than left. Imaging appearance is not specifically  characteristic of Covid pneumonia and could reflect viral inflammation or less  likely developing asymmetric pulmonary edema. 3. Multisegmental dependent atelectasis/consolidation right lower lobe adjacent  to a small effusion.  -Similar but less extensive subsegmental atelectasis adjacent to trace left  effusion. 06/25/22    ECHO ADULT COMPLETE 06/29/2022 6/29/2022    Interpretation Summary  Formatting of this result is different from the original.      Left Ventricle: Mildly reduced left ventricular systolic function with a visually estimated EF of 50 - 55%. Left ventricle is mildly dilated. Increased wall thickness. Findings consistent with mild concentric hypertrophy. See diagram for wall motion findings. Diastolic dysfunction present with normal LV EF. Mitral Valve: Mildly thickened leaflet. Mild regurgitation with a centrally directed jet. Signed by: Davida Sandy MD on 6/29/2022 12:12 PM       MRI Results (most recent):  No results found for this or any previous visit.         Labs:    Recent Results (from the past 48 hour(s)) GLUCOSE, POC    Collection Time: 09/15/22  3:37 PM   Result Value Ref Range    Glucose (POC) 171 (H) 70 - 110 mg/dL   GLUCOSE, POC    Collection Time: 09/15/22  9:15 PM   Result Value Ref Range    Glucose (POC) 214 (H) 70 - 484 mg/dL   METABOLIC PANEL, BASIC    Collection Time: 09/16/22  5:11 AM   Result Value Ref Range    Sodium 135 (L) 136 - 145 mmol/L    Potassium 5.6 (H) 3.5 - 5.5 mmol/L    Chloride 106 100 - 111 mmol/L    CO2 22 21 - 32 mmol/L    Anion gap 7 3.0 - 18 mmol/L    Glucose 105 (H) 74 - 99 mg/dL    BUN 94 (H) 7.0 - 18 MG/DL    Creatinine 2.49 (H) 0.6 - 1.3 MG/DL    BUN/Creatinine ratio 38 (H) 12 - 20      GFR est AA 23 (L) >60 ml/min/1.73m2    GFR est non-AA 19 (L) >60 ml/min/1.73m2    Calcium 9.2 8.5 - 10.1 MG/DL   CBC W/O DIFF    Collection Time: 09/16/22  5:11 AM   Result Value Ref Range    WBC 13.9 (H) 4.6 - 13.2 K/uL    RBC 3.05 (L) 4.20 - 5.30 M/uL    HGB 8.5 (L) 12.0 - 16.0 g/dL    HCT 28.5 (L) 35.0 - 45.0 %    MCV 93.4 78.0 - 100.0 FL    MCH 27.9 24.0 - 34.0 PG    MCHC 29.8 (L) 31.0 - 37.0 g/dL    RDW 17.4 (H) 11.6 - 14.5 %    PLATELET 439 596 - 393 K/uL    MPV 10.3 9.2 - 11.8 FL    NRBC 2.4 (H) 0  WBC    ABSOLUTE NRBC 0.34 (H) 0.00 - 0.01 K/uL   GLUCOSE, POC    Collection Time: 09/16/22  8:06 AM   Result Value Ref Range    Glucose (POC) 113 (H) 70 - 110 mg/dL   GLUCOSE, POC    Collection Time: 09/16/22 11:47 AM   Result Value Ref Range    Glucose (POC) 148 (H) 70 - 110 mg/dL   HEP B SURFACE AB    Collection Time: 09/16/22 11:52 AM   Result Value Ref Range    Hepatitis B surface Ab <3.10 (L) >10.0 mIU/mL    Hep B surface Ab Interp. Negative (A) POS      Hep B surface Ab comment        Samples with a  value of 10 mIU/mL or greater are considered positive (protective immunity) in accordance with the CDC guidelines. HEP B SURFACE AG    Collection Time: 09/16/22 11:52 AM   Result Value Ref Range    Hepatitis B surface Ag 0.31 <1.00 Index    Hep B surface Ag Interp.  Negative NEG GLUCOSE, POC    Collection Time: 09/16/22  4:45 PM   Result Value Ref Range    Glucose (POC) 108 70 - 110 mg/dL   GLUCOSE, POC    Collection Time: 09/16/22  9:10 PM   Result Value Ref Range    Glucose (POC) 217 (H) 70 - 528 mg/dL   METABOLIC PANEL, BASIC    Collection Time: 09/17/22  5:48 AM   Result Value Ref Range    Sodium 136 136 - 145 mmol/L    Potassium 5.7 (H) 3.5 - 5.5 mmol/L    Chloride 108 100 - 111 mmol/L    CO2 23 21 - 32 mmol/L    Anion gap 5 3.0 - 18 mmol/L    Glucose 139 (H) 74 - 99 mg/dL    BUN 78 (H) 7.0 - 18 MG/DL    Creatinine 1.78 (H) 0.6 - 1.3 MG/DL    BUN/Creatinine ratio 44 (H) 12 - 20      GFR est AA 34 (L) >60 ml/min/1.73m2    GFR est non-AA 28 (L) >60 ml/min/1.73m2    Calcium 9.3 8.5 - 10.1 MG/DL   CBC WITH AUTOMATED DIFF    Collection Time: 09/17/22  5:48 AM   Result Value Ref Range    WBC 11.2 4.6 - 13.2 K/uL    RBC 3.03 (L) 4.20 - 5.30 M/uL    HGB 8.8 (L) 12.0 - 16.0 g/dL    HCT 28.9 (L) 35.0 - 45.0 %    MCV 95.4 78.0 - 100.0 FL    MCH 29.0 24.0 - 34.0 PG    MCHC 30.4 (L) 31.0 - 37.0 g/dL    RDW 17.1 (H) 11.6 - 14.5 %    PLATELET 843 100 - 802 K/uL    MPV 10.5 9.2 - 11.8 FL    NRBC 1.3 (H) 0  WBC    ABSOLUTE NRBC 0.15 (H) 0.00 - 0.01 K/uL    NEUTROPHILS 77 (H) 40 - 73 %    LYMPHOCYTES 11 (L) 21 - 52 %    MONOCYTES 9 3 - 10 %    EOSINOPHILS 0 0 - 5 %    BASOPHILS 0 0 - 2 %    IMMATURE GRANULOCYTES 4 (H) 0.0 - 0.5 %    ABS. NEUTROPHILS 8.6 (H) 1.8 - 8.0 K/UL    ABS. LYMPHOCYTES 1.2 0.9 - 3.6 K/UL    ABS. MONOCYTES 1.0 0.05 - 1.2 K/UL    ABS. EOSINOPHILS 0.0 0.0 - 0.4 K/UL    ABS. BASOPHILS 0.0 0.0 - 0.1 K/UL    ABS. IMM.  GRANS. 0.4 (H) 0.00 - 0.04 K/UL    DF AUTOMATED     GLUCOSE, POC    Collection Time: 09/17/22  7:17 AM   Result Value Ref Range    Glucose (POC) 146 (H) 70 - 110 mg/dL       Signed By: Carlos Manuel Saldivar MD     September 17, 2022      I spent 25 minutes with the patient in face-to-face consultation, of which greater than 50% was spent in counseling and coordination of care as described above    Disclaimer: Sections of this note are dictated using utilizing voice recognition software. Minor typographical errors may be present. If questions arise, please do not hesitate to contact me or call our department.

## 2022-09-17 NOTE — DIALYSIS
ACUTE HEMODIALYSIS FLOW SHEET    HEMODIALYSIS ORDERS: Physician: Dr. Curry Ply: Primo   Duration: 3.5 hr   BFR: 350   DFR: 600   Dialysate:  Temp 36-37*C   K+  2    Ca+ 2.5   Na 138   Bicarb 35   Wt Readings from Last 1 Encounters:   09/17/22 103.5 kg (228 lb 1.6 oz)    Patient Chart [x]   Unable to Obtain []  Dry weight/UF Goal: 1500 ml    Heparin []  Bolus    Units    [] Hourly    Units    [x]None       Pre BP: 129/59  Pulse: 66  Respirations: 18 Temp: 98.0  [x] Oral  [] Ax  [] Esoph   Labs: []  Pre  []  Post:   [] N/A   Additional Orders (medications, blood products, hypotension management): [] Yes   [x] No     [x]  [facility] Consent Verified     CATHETER ACCESS:    [x]Right   []Left   [x]IJ   []Fem  []Chest wall  []TransHepatic   [] First use X-ray verified     []Tunnel    [x] Non Tunneled   [x]No S/S infection  []Redness  []Drainage []Cultured []Swelling []Pain   [x]Medical Aseptic Prep Utilized   [x]Dressing Changed  09/17/2022 [] Biopatch  Date:    []Clotted   [x]Patent   Flows: [x]Good  []Poor  []Reversed   If access problem,  notified: []Yes    [x]N/A        GRAFT/FISTULA ACCESS:   [x]N/A                     Hospital: [facility]          Room # 2314/01    [x] Routine         [] 1st Time Acute/Chronic   [] Urgent      [] Stat            [x] Acute Room   []  Bedside    [] ICU/CCU     [] ER     Isolation Precautions:  [x] Dialysis    There are currently no Active Isolations     ALLERGIES:     No Known Allergies     Code Status:  Full Code     Hepatitis Status      Lab Results   Component Value Date/Time    Hepatitis A, IgM NEGATIVE  03/23/2015 09:04 AM    Hepatitis B surface Ag 0.31 09/16/2022 11:52 AM    Hepatitis B surface Ab <3.10 (L) 09/16/2022 11:52 AM    Hepatitis B core, IgM NEGATIVE  03/23/2015 09:04 AM    Hepatitis C virus Ab 0.09 03/23/2015 09:04 AM        Current Labs:      Lab Results   Component Value Date/Time    WBC 11.2 09/17/2022 05:48 AM    HGB 8.8 (L) 09/17/2022 05:48 AM HCT 28.9 (L) 09/17/2022 05:48 AM    PLATELET 576 09/59/4343 05:48 AM    MCV 95.4 09/17/2022 05:48 AM     Lab Results   Component Value Date/Time    Sodium 136 09/17/2022 05:48 AM    Potassium 5.7 (H) 09/17/2022 05:48 AM    Chloride 108 09/17/2022 05:48 AM    CO2 23 09/17/2022 05:48 AM    Anion gap 5 09/17/2022 05:48 AM    Glucose 139 (H) 09/17/2022 05:48 AM    BUN 78 (H) 09/17/2022 05:48 AM    Creatinine 1.78 (H) 09/17/2022 05:48 AM    BUN/Creatinine ratio 44 (H) 09/17/2022 05:48 AM    GFR est AA 34 (L) 09/17/2022 05:48 AM    GFR est non-AA 28 (L) 09/17/2022 05:48 AM    Calcium 9.3 09/17/2022 05:48 AM          DIET:  DIET ADULT     PRIMARY NURSE REPORT:   Pre Dialysis: Nayan Price RN    Time: 0935     EDUCATION:    [x] Patient           Knowledge Basis: []None []Minimal [x] Substantial [] Unknown  Barriers to learning  [x]None  [] Intubated/Trached/Ventilated  [] Sedated/Paralyzed   [x] Access Care     [] S&S of infection  [] Fluid Management  [] K+   [x] Procedural    [] Medications   [] Tx Options   [] Transplant   [] Diet      Teaching Tools:  [x] Explain  [] Demo  [] Handouts [] Video  Patient response: [x] Verbalized understanding   [] Requires follow up        [x] Time Out/Safety Check    [x] Extracorporeal Circuit Tested for integrity       RO/HEMODIALYSIS MACHINE SAFETY CHECKS - Before each treatment:        [address] [place] [address]                                     [] Unit Machine # 0 with centralized RO                                  [] Portable Machine #1/RO serial # [id number]                                  [] Portable Machine #2/RO serial # [id number]                                  [] Portable Machine #4/RO serial # [id number]                                  [x] Portable Machine #3 /RO serial # [id number]                                                                                                       Alarm Test:  Pass time 1011            [x] RO/Machine Log Complete    Machine Temp    36-37*C Dialysate: pH  7.4    Conductivity: Meter 14.0    RO Machine  3     TCD: 13.7  Dialyzer Lot # [id number]     Blood Tubing Lot # [id number]     Saline Lot # [id number]     CHLORINE TESTING-Before each treatment and every 4 hours    Total Chlorine: [x] less than 0.1 ppm  Initial Time Check: 1000       4 Hr/2nd Check Time: 1400   (if greater than 0.1 ppm from Primary then every 30 minutes from Secondary)     TREATMENT INITIATION - with Dialysis Precautions:   [x] All Connections Secured              [x] Saline Line Double Clamped   [x] Venous Parameters Set               [x] Arterial Parameters Set    [x] Prime Given 250ml NSS              [x]Air Foam Detector Engaged        Treatment Initiation Note:  SEE HD RN NOTES    During Treatment Notes:  1192  Face & Vascular access visible with art and adan line connections intact. Pt tolerating dialysis. 1034  Face & Vascular access visible with art and adan line connections intact. Pt tolerating dialysis. 1045  Face & Vascular access visible with art and adan line connections intact. Pt tolerating dialysis. 1100  Face & Vascular access visible with art and adan line connections intact. Pt tolerating dialysis. 1115  Face & Vascular access visible with art and adan line connections intact. Pt tolerating dialysis. 1130  Face & Vascular access visible with art and adan line connections intact. Pt tolerating dialysis. 1145  Face & Vascular access visible with art and adan line connections intact. Pt tolerating dialysis. 1200  Face & Vascular access visible with art and adan line connections intact. Pt tolerating dialysis. 1215  Face & Vascular access visible with art and adan line connections intact. Pt tolerating dialysis. 1230  Face & Vascular access visible with art and adan line connections intact. Pt tolerating dialysis. 1245  Face & Vascular access visible with art and adan line connections intact. Pt tolerating dialysis.   1300  Face & Vascular access visible with art and adan line connections intact. Pt tolerating dialysis. 1315  Face & Vascular access visible with art and adan line connections intact. Pt tolerating dialysis. 1330  Face & Vascular access visible with art and adan line connections intact. Pt tolerating dialysis. 1345  Face & Vascular access visible with art and adan line connections intact. Pt tolerating dialysis. 1410  Dialysis treatment complete.            Post Assessment  Dialyzer Cleared:   [x] Good  [] Fair  [] Poor  Blood processed:  68.0 L  UF Removed:  1500 Ml    Post BP: 138/61  Pulse: 66  Respirations: 18   Temp: 96.8 temporal  [] Oral  [] Ax  [] Esophageal   Lungs: [] Clear                [x] No change from initial assessment   Post Tx Vascular Access: [x] N/A     Cardiac:  [x] Regular   [] Irregular   Rhythm:  [] Monitored   [x] Not Monitored    CVC Catheter: [] N/A  Locking solution: Heparin 1:1000 U  Arterial port 1.1ml   Venous port 1.1 ml   Edema:  [] None  [] Generalized                     Skin:[] Warm  [x] Dry [] Diaphoretic               [] Flushed  [] Pale [] Cyanotic Pain:  [x]0  []1-2  []3-4  []5-6   []7-8  []9-10         Post Treatment Note:   SEE HD RN NOTES     POST TREATMENT PRIMARY NURSE HANDOFF REPORT:   Post Dialysis: Anabella Bruner RN        Time:  3310       Abbreviations: AVG-arterial venous graft, AVF-arterial venous fistula, IJ-Internal Jugular, Subcl-Subclavian, Fem-Femoral, Tx-treatment, AP/HR-apical heart rate, VSS- Vital Signs Stable, CVC- Central Venous Catheter, DFR-dialysate flow rate, BFR-blood flow rate, AP-arterial pressure, -venous pressure, UF-ultrafiltrate, TMP-transmembrane pressure, Adan-Venous, Art-Arterial, RO-Reverse Osmosis

## 2022-09-17 NOTE — PROGRESS NOTES
Comprehensive Nutrition Assessment    Type and Reason for Visit: Initial, RD nutrition re-screen/LOS    Nutrition Recommendations/Plan:   Modify PO diet to [facility], 2gm Na, Low K restriction as tolerated. Monitor PO intake, compliance of oral supplement, weight, labs and plan of care during admission. Malnutrition Assessment:  Malnutrition Status:  No malnutrition (09/17/22 1032)      Nutrition History and Allergies: PMHx: CHF, COPD, DM, recent COVID, CKD. Wt hx: 210 lb (12/19/21), 229 lb (7/10/22), wt fluctuates ~210-230 lb per EMR. No food allergies. Nutrition Assessment:    Admitted with SOB. Pt w/ worsening renal function, initiated dialysis. Pt off unit at time of visit. Nutrition Related Findings:    Pertinent Meds: lipitor, pulmicort, gabapentin, lantus, humalog  Pertinent Labs: Na 136 wnl, K 5.7 H, BUN/Cr 78/1.78, GFR 34        Current Nutrition Intake & Therapies:  Average Meal Intake: %  Average Supplement Intake: None ordered  ADULT DIET Regular; 5 carb choices (75 gm/meal); Low Sodium (2 gm); Low Potassium (Less than 3000 mg/day)    Anthropometric Measures:  Height: 5' 4\" (162.6 cm)  Ideal Body Weight (IBW): 120 lbs (55 kg)  Admission Body Weight: 219 lb 12.8 oz (99.7 kg lowest, 9/10)  Current Body Wt:  103.5 kg (228 lb 2.8 oz), 190.1 % IBW.     Current BMI (kg/m2): 39.1    Estimated Daily Nutrient Needs:  Energy Requirements Based On: Formula  Weight Used for Energy Requirements: Admission  Energy (kcal/day): 1437-7797 kcals/day (MSJ 1-1.2)  Weight Used for Protein Requirements: Ideal  Protein (g/day):  g/day (1.5-2 g/day)  Method Used for Fluid Requirements: 1 ml/kcal  Fluid (ml/day): 1559-3271 ml/day    Nutrition Diagnosis:   Altered nutrition-related lab values related to renal dysfunction, endocrine dysfunction as evidenced by lab values    Nutrition Interventions:   Food and/or Nutrient Delivery: Modify current diet     Coordination of Nutrition Care: Continue to monitor while inpatient       Goals:     Goals: Meet at least 75% of estimated needs, by next RD assessment       Nutrition Monitoring and Evaluation:      Food/Nutrient Intake Outcomes: Food and nutrient intake  Physical Signs/Symptoms Outcomes: Biochemical data, Hemodynamic status, Fluid status or edema, Meal time behavior, Weight    Discharge Planning:    Continue current diet    Manjinder Cuellar [address] [place] [address]   Contact: [phone]

## 2022-09-17 NOTE — PROGRESS NOTES
1015: Patient left floor with transport for dialysis. 1700: Patient in bed having dinner. Dialysis done today.

## 2022-09-17 NOTE — DIALYSIS
GENERAL ASSESSMENT: CVC assessed no abnormalities noted, line patent with good flow. CVC accessed wihout any difficulty. Nurse Logan Hernandez, initiated treatment and monitored patient. LUNGS:  Resp Rate 18   [] Clear  [] Coarse  [] Crackles  [] Wheezing  [x] Diminished                                                           [] RLL   [] LLL  [] RUL   [] ISAURO            Respirations:  [x]Easy  []Labored  []N/A  Cough:  []Productive  []Dry  [x]N/A               Therapy:  []RA   [] Ventilated   [] Intubated   [] Trach            O2 Device:  [x] NC   [] NRB  [] Trach Mask  [] BiPaP  Flow:  2 l/min                                                    CARDIAC: [x] Regular      [] Irregular   [] Rhythm:          [] Monitored   [] Bedside   [] Remotely monitored       EDEMA: [] None   [x]Generalized  [] Pitting [] 1+   [] 2 +   [] 3+    [] 4+        SKIN:   [] Hot     [] Cold    [x] Warm   [x] Dry    [] Diaphoretic                 [] Flushed  [] Jaundiced  [] Cyanotic  [] Pale      LOC:    [x] Alert      [x]Oriented:    [x] Person     [] Place   []Time               [] Confused  [] Lethargic  [] Medicated  [] Non-responsive  [] Non-Verbal     GI / ABDOMEN:                     [] Flat    [] Distended    [x] Soft    [] Firm   []  Obese                   [] Diarrhea   [] FMS [x] Bowel Sounds  [] Nausea  [] Vomiting                   [] NGT  [] OGT  [] PEG  [] Tube Feedings @     mL/hr     / URINE ASSESSMENT:                   [] Voiding    [x] Oliguria  [] Anuria                     []  Frye   [] Incontinent  []  Incontinent Brief   []  PureWick     PAIN:  [] 0 []1  []2   []3   []4   []5   []6   []7   []8   []9   []10                MOBILITY:  [x] Bed    [] Stretcher      All Vitals and Treatment Details on Attached Flowsheet    Pt tolerated dialysis well. Dialysis catheter intact, patent and heplocked as noted above.

## 2022-09-17 NOTE — PROGRESS NOTES
Progress Note  Pulmonary, Critical Care, and Sleep Medicine    Name: Martin Silverman MRN: [id number]   : 1950 Hospital: [address] [place] [address]    Date: 2022        IMPRESSION:   Wheezing in a former smoker likely bronchospasm/undiagnosed COPD vs \"cardiac asthma\" in the setting of chronic systolic/diastolic heart failure, favor the latter Echo from  with EF 45% and mild to moderate mitral regurgitation  Acute hypoxic respiratory failure related to flash pulmonary edema. On O2 2 LPM  Hypertensive crisis, resolved  TESSA  Was followed by Dr. Cristóbal Sunshine- Previously on unknown PAP therapy. Device was recalled about 2 years ago an the patient was lost to follow up. DM 2  Acute on chronic kidney disease with increasing BUN/Creatinine  Hyperkalemia- improved,   Ground glass opacities on CT from August probably due to COVID vs heart failure  History of COPD although PFT in  consistent with restrictive lung disease  History of STEMI in 2018 requiring intubation and mechanical ventilation  Former smoker 46 PY, quit in 2019  Chronic pain syndrome  Obesity Body Mass Index Body mass index is 39.15 kg/m². Recent COVID positive       PLAN:   HD per nephrology   Continue PRN bronchodilators  Continue BIPAP HS and PRN gregor   Maintain aspiration precautions  SpO2 goal: 90-94%- Avoid overoxygenation  Unable to R/O COPD at this time. Continue with Brovana/Pulmicort while in-patient  May switch back to [facility] on discharge and PRN albuterol  Management of hyperkalemia and CARA per nephrology and may include dialysis  Prophylaxis issues and glycemic control per primary team  Assess SpO2 at rest and with ambulation prior to discharge to assess for possible need of OP supplemental oxygen. IF patient want to continue outpatient PAP therapy, then the patient will need to follow up with OP Sleep provider, Dr. Cristóbal Sunshine. Outpatient Pulmonary follow up:PFT's and 6 minute walk to further characterize deficits.  Suspect more of restrictive disorder        Subjective/Interval History:   I have reviewed the flowsheet and previous days notes. Reviewed interval history. Discussed management with nursing staff.    22  LOS: 8    Right HD-IJC placed 22  HD first treatment today  Patient states that she only feel a little bit better than yesterday  She still reports some dyspnea at rest but she does  not look like she is in respiratory distress by my evaluation  No sputum , No hemoptysis, No cough  Afebrile  She ate lunch today. Reports BM yesterday  Patient did not use BIPAP last night  In regards to PAP therapy. The patient was seeing Dr. Jean Malone. She was using a [employer] - PAP device: unknown settings and mode. The patient's device was then recalled and the patient stopped using her device. She reports that she did not know she had to register her device. She does not know who her DME was. Prior to recall, the patient reports using her device without difficulty. No Intolerance issues      Patient Vitals for the past 24 hrs:   Temp Pulse Resp BP SpO2   22 0820 98.4 °F (36.9 °C) 66 15 (!) 158/62 95 %   22 0354 98 °F (36.7 °C) 64 17 116/66 94 %   22 2345 97.8 °F (36.6 °C) 62 12 (!) 129/55 99 %   22 195 -- -- -- -- 94 %   22 1949 97.6 °F (36.4 °C) 63 16 96/72 97 %   22 1641 97.9 °F (36.6 °C) 71 20 (!) 130/53 95 %   22 1104 97.8 °F (36.6 °C) 67 19 (!) 116/52 98 %     Inpat Anti-Infectives (From admission, onward)      None              ROS:Pertinent items are noted in HPI. Orders reviewed including medications. Changes made if indicated. Telemetry monitor reviewed at the bedside.   Objective:   Vital Signs:    Visit Vitals  BP (!) 158/62   Pulse 66   Temp 98.4 °F (36.9 °C)   Resp 15   Ht 5' 4\" (1.626 m)   Wt 103.5 kg (228 lb 1.6 oz)   SpO2 95%   BMI 39.15 kg/m²       O2 Device: None (Room air)   O2 Flow Rate (L/min): 2 l/min   Temp (24hrs), Av.9 °F (36.6 °C), Min:97.6 °F (36.4 °C), Max:98.4 °F (36.9 °C)       Intake/Output:   Last shift:      No intake/output data recorded. Last 3 shifts: 09/15 1901 - 09/17 0700  In: 480 [P.O.:480]  Out: -   No intake or output data in the 24 hours ending 09/17/22 1437       Physical Exam:    General:  Alert, cooperative, in no distress, appears stated age. Obese    Head:  Normocephalic, without obvious abnormality, atraumatic. Eyes:  ANicteric, PERRL,   Nose: Nares normal. Mucosa normal. No drainage or sinus tenderness. Throat: Lips, mucosa, and tongue normal.     Neck: Supple, symmetrical, trachea midline, no adenopathy, thyroid: no enlargment/tenderness/nodules, no carotid bruit and no JVD ,    Back:   Symmetric    Lungs:   Bilateral auscultation distant breath sounds, no rales or wheezes   Chest wall:  No tenderness or deformity. NO intercostal retractions   Heart:  Regular rate and rhythm, S1, S2 normal, no murmur, click, rub or gallop. Abdomen:   Soft, non-tender. Bowel sounds normal. No masses,  No organomegaly. NO paradoxical motion   Extremities: normal, atraumatic, no cyanosis, trace pedal edema   Pulses: 2+ and symmetric all extremities. Skin: Skin color, texture, turgor normal. No rashes or lesions.  NO clubbing   Lymph nodes: Cervical, supraclavicular nodes normal.   Neurologic: Grossly nonfocal      :        Devices: right IJ-HCT            Drips:    DATA:  Labs:  Recent Labs     09/17/22  0548 09/16/22  0511   WBC 11.2 13.9*   HGB 8.8* 8.5*   HCT 28.9* 28.5*    337       Recent Labs     09/17/22  0548 09/16/22  0511 09/15/22  0502    135* 135*   K 5.7* 5.6* 5.9*    106 106   CO2 23 22 22   * 105* 167*   BUN 78* 94* 93*   CREA 1.78* 2.49* 2.39*   CA 9.3 9.2 9.3       Lab Results   Component Value Date/Time    TSH 0.21 (L) 03/19/2021 04:00 AM    Triiodothyronine (T3), free 3.2 02/09/2016 08:47 AM    T4, Free 1.2 10/20/2019 04:23 AM     Lab Results   Component Value Date/Time    NT pro-BNP 2,013 (H) 09/09/2022 07:34 AM    NT pro-BNP 1,871 (H) 08/08/2022 01:48 AM    NT pro-BNP 3,952 (H) 07/08/2022 09:53 AM    NT pro-BNP 1,020 (H) 03/18/2021 01:29 AM    NT pro-BNP 4,929 (H) 10/22/2019 03:30 AM         No results for input(s): PH, PCO2, PO2, HCO3, FIO2 in the last 72 hours. MICRO:  Results       Procedure Component Value Units Date/Time    CULTURE, BLOOD [[id number]] Collected: 09/09/22 0840    Order Status: Completed Specimen: Blood Updated: 09/15/22 0619     Special Requests: NO SPECIAL REQUESTS        Culture result: NO GROWTH 6 DAYS       CULTURE, BLOOD [[id number]] Collected: 09/09/22 0830    Order Status: Completed Specimen: Blood Updated: 09/15/22 0619     Special Requests: NO SPECIAL REQUESTS        Culture result: NO GROWTH 6 DAYS               Imaging:              XR Results (most recent):  Results from Hospital Encounter encounter on 09/09/22    XR CHEST PORT    Narrative  EXAM: XR CHEST PORT    CLINICAL INDICATION/HISTORY: 81 years Female. S/P Dialysis catheter insertion. Additional History: None    TECHNIQUE: Frontal view of the chest    COMPARISON: 9/12/2022    FINDINGS:    Underpenetration of the lower chest secondary to body habitus. Interval placement of a right IJ approach dual-lumen vascular catheter. The tip  is approximately 5.5 cm distal to the cavoatrial junction. The cardiac silhouette is mild to moderately enlarged increase in size from  prior. Moderate pulmonary vascular congestion and diffuse bilateral interstitial  opacities, increased from prior. Aortic calcifications noted. No definite evidence of pleural effusion or pneumothorax. No acute osseous abnormality appreciated. Impression  Interval dialysis catheter placement, with tip approximately 5.5 cm distal to  the cavoatrial junction; consider retracting to this level. No pneumothorax  detected. Interval enlargement cardiac silhouette enlargement.  Interval increased moderate  pulmonary vascular congestion with diffuse bilateral interstitial edema versus  infiltrates. Complex decision making was made in the evaluation and management plans during this consultation. More than 50% of time was spent in counseling and coordination of care including review of data and discussion with other team members.       Aniceto Acevedo DO, [facility]    [facility]  Pulmonary, Critical Care, and Sleep Medicine

## 2022-09-18 LAB
ANION GAP SERPL CALC-SCNC: 4 MMOL/L (ref 3–18)
BUN SERPL-MCNC: 38 MG/DL (ref 7–18)
BUN/CREAT SERPL: 24 (ref 12–20)
CALCIUM SERPL-MCNC: 8.8 MG/DL (ref 8.5–10.1)
CHLORIDE SERPL-SCNC: 111 MMOL/L (ref 100–111)
CO2 SERPL-SCNC: 29 MMOL/L (ref 21–32)
CREAT SERPL-MCNC: 1.57 MG/DL (ref 0.6–1.3)
GLUCOSE BLD STRIP.AUTO-MCNC: 114 MG/DL (ref 70–110)
GLUCOSE BLD STRIP.AUTO-MCNC: 121 MG/DL (ref 70–110)
GLUCOSE BLD STRIP.AUTO-MCNC: 155 MG/DL (ref 70–110)
GLUCOSE BLD STRIP.AUTO-MCNC: 156 MG/DL (ref 70–110)
GLUCOSE BLD STRIP.AUTO-MCNC: 207 MG/DL (ref 70–110)
GLUCOSE SERPL-MCNC: 123 MG/DL (ref 74–99)
POTASSIUM SERPL-SCNC: 4.5 MMOL/L (ref 3.5–5.5)
SODIUM SERPL-SCNC: 144 MMOL/L (ref 136–145)

## 2022-09-18 PROCEDURE — 74011000250 HC RX REV CODE- 250: Performed by: HOSPITALIST

## 2022-09-18 PROCEDURE — 80048 BASIC METABOLIC PNL TOTAL CA: CPT

## 2022-09-18 PROCEDURE — 99232 SBSQ HOSP IP/OBS MODERATE 35: CPT | Performed by: INTERNAL MEDICINE

## 2022-09-18 PROCEDURE — 74011000250 HC RX REV CODE- 250: Performed by: INTERNAL MEDICINE

## 2022-09-18 PROCEDURE — 74011250637 HC RX REV CODE- 250/637: Performed by: INTERNAL MEDICINE

## 2022-09-18 PROCEDURE — 74011636637 HC RX REV CODE- 636/637: Performed by: HOSPITALIST

## 2022-09-18 PROCEDURE — 74011250637 HC RX REV CODE- 250/637: Performed by: PHYSICIAN ASSISTANT

## 2022-09-18 PROCEDURE — 94640 AIRWAY INHALATION TREATMENT: CPT

## 2022-09-18 PROCEDURE — 82962 GLUCOSE BLOOD TEST: CPT

## 2022-09-18 PROCEDURE — 65270000046 HC RM TELEMETRY

## 2022-09-18 PROCEDURE — 36415 COLL VENOUS BLD VENIPUNCTURE: CPT

## 2022-09-18 RX ORDER — IPRATROPIUM BROMIDE 0.5 MG/2.5ML
0.5 SOLUTION RESPIRATORY (INHALATION)
Status: DISCONTINUED | OUTPATIENT
Start: 2022-09-18 | End: 2022-09-22 | Stop reason: HOSPADM

## 2022-09-18 RX ADMIN — DILTIAZEM HYDROCHLORIDE 120 MG: 120 CAPSULE, COATED, EXTENDED RELEASE ORAL at 08:30

## 2022-09-18 RX ADMIN — IPRATROPIUM BROMIDE 0.5 MG: 0.5 SOLUTION RESPIRATORY (INHALATION) at 15:27

## 2022-09-18 RX ADMIN — Medication 2 UNITS: at 12:27

## 2022-09-18 RX ADMIN — SODIUM CHLORIDE, PRESERVATIVE FREE 10 ML: 5 INJECTION INTRAVENOUS at 05:39

## 2022-09-18 RX ADMIN — GABAPENTIN 400 MG: 400 CAPSULE ORAL at 08:30

## 2022-09-18 RX ADMIN — Medication 6 UNITS: at 17:19

## 2022-09-18 RX ADMIN — BUDESONIDE 500 MCG: 0.5 SUSPENSION RESPIRATORY (INHALATION) at 19:44

## 2022-09-18 RX ADMIN — POLYETHYLENE GLYCOL 3350 17 G: 17 POWDER, FOR SOLUTION ORAL at 12:32

## 2022-09-18 RX ADMIN — BUDESONIDE 500 MCG: 0.5 SUSPENSION RESPIRATORY (INHALATION) at 08:47

## 2022-09-18 RX ADMIN — CLONIDINE HYDROCHLORIDE 0.1 MG: 0.1 TABLET ORAL at 08:30

## 2022-09-18 RX ADMIN — ASPIRIN 81 MG CHEWABLE TABLET 81 MG: 81 TABLET CHEWABLE at 08:30

## 2022-09-18 RX ADMIN — ARFORMOTEROL TARTRATE 15 MCG: 15 SOLUTION RESPIRATORY (INHALATION) at 08:47

## 2022-09-18 RX ADMIN — ATORVASTATIN CALCIUM 40 MG: 40 TABLET, FILM COATED ORAL at 21:44

## 2022-09-18 RX ADMIN — OXYCODONE HYDROCHLORIDE AND ACETAMINOPHEN 1 TABLET: 5; 325 TABLET ORAL at 17:23

## 2022-09-18 RX ADMIN — OXYCODONE HYDROCHLORIDE AND ACETAMINOPHEN 1 TABLET: 5; 325 TABLET ORAL at 08:30

## 2022-09-18 RX ADMIN — OXYCODONE HYDROCHLORIDE AND ACETAMINOPHEN 1 TABLET: 5; 325 TABLET ORAL at 21:43

## 2022-09-18 RX ADMIN — OXYCODONE HYDROCHLORIDE AND ACETAMINOPHEN 1 TABLET: 5; 325 TABLET ORAL at 12:31

## 2022-09-18 RX ADMIN — ARFORMOTEROL TARTRATE 15 MCG: 15 SOLUTION RESPIRATORY (INHALATION) at 19:44

## 2022-09-18 RX ADMIN — IPRATROPIUM BROMIDE AND ALBUTEROL SULFATE 3 ML: .5; 3 SOLUTION RESPIRATORY (INHALATION) at 19:44

## 2022-09-18 RX ADMIN — HYDRALAZINE HYDROCHLORIDE 10 MG: 10 TABLET, FILM COATED ORAL at 17:19

## 2022-09-18 RX ADMIN — HYDRALAZINE HYDROCHLORIDE 10 MG: 10 TABLET, FILM COATED ORAL at 08:30

## 2022-09-18 RX ADMIN — SODIUM CHLORIDE, PRESERVATIVE FREE 10 ML: 5 INJECTION INTRAVENOUS at 21:48

## 2022-09-18 RX ADMIN — SODIUM ZIRCONIUM CYCLOSILICATE 5 G: 5 POWDER, FOR SUSPENSION ORAL at 08:30

## 2022-09-18 RX ADMIN — CLONIDINE HYDROCHLORIDE 0.1 MG: 0.1 TABLET ORAL at 17:19

## 2022-09-18 RX ADMIN — SODIUM CHLORIDE, PRESERVATIVE FREE 10 ML: 5 INJECTION INTRAVENOUS at 17:20

## 2022-09-18 RX ADMIN — Medication 15 UNITS: at 12:27

## 2022-09-18 NOTE — PROGRESS NOTES
1925  Bedside and Verbal shift change  Received from [facility] (outgoing nurse), to GLADIS Ellison (oncoming)  Pt. Is AOX 4. IV SL, Pt. denies  pain at this time. Report included the following information  SBAR, Kardex, Procedure Summary, Intake/Output, MAR, Recent Lab Results, and  Cardiac Rhythm @ SR. Will resume care and monitor Pt. Condition. Pt. Educated on call bell when in need of help and assistance. Pt. verbalized understanding. 1950 Pt. Head to toe Assessment Done and documented. 2155  [place]. [place] [facility] [place]1 soft medium brown. 2230  Pt. Denies pain. 2345  Pt. Resting with eyes closed, easily awaken. 0130  Pt. Made no complaints. 0330  Pt denies discomfort. 0530  Pt. Able to rest and sleep well throughout the shift.    0700  Pt. Condition. Report consisted of patients Situation, History, Activities, intake/output,  Background, Assessment and Recommendations(SBAR). Information from the following report(s) Kardex, order Summary, Lab results and MAR was reviewed with the receiving nurse. Opportunity for questions and clarification was provided.

## 2022-09-18 NOTE — PROGRESS NOTES
Progress Note  Pulmonary, Critical Care, and Sleep Medicine    Name: Neysa Meckel MRN: [id number]   : 1950 Hospital: [facility]   Date: 2022        IMPRESSION:   Wheezing in a former smoker likely bronchospasm/undiagnosed COPD vs \"cardiac asthma\" in the setting of chronic systolic/diastolic heart failure, favor the latter Echo from  with EF 45% and mild to moderate mitral regurgitation  Acute hypoxic respiratory failure related to flash pulmonary edema. On O2 2 LPM  Hypertensive crisis, resolved  TESSA  Was followed by Dr. Yuan Garcia- Previously on unknown PAP therapy. Device was recalled about 2 years ago an the patient was lost to follow up. DM 2  Acute on chronic kidney disease with increasing BUN/Creatinine  Hyperkalemia- improved,   Ground glass opacities on CT from August probably due to COVID vs heart failure  History of COPD although PFT in  consistent with restrictive lung disease  History of STEMI in 2018 requiring intubation and mechanical ventilation  Former smoker 46 PY, quit in 2019  Chronic pain syndrome  Obesity Body Mass Index Body mass index is 39.15 kg/m². Recent COVID positive       PLAN:   HD per nephrology   Continue BIPAP HS and PRN gregor   Maintain aspiration precautions  SpO2 goal: 90-94%- Avoid over oxygenation    Unable to R/O COPD at this time. Continue with Brovana/Pulmicort while in-patient  May switch back to [facility] on discharge and PRN albuterol  Will add scheduled Atrovent today and monitor for response  Continue PRN bronchodilators    Prophylaxis issues and glycemic control per primary team  PT/OT/OOBTC as clinical status allows    Assess SpO2 at rest and with ambulation prior to discharge to assess for possible need of OP supplemental oxygen. IF patient want to continue outpatient PAP therapy, then the patient will need to follow up with OP Sleep provider, Dr. Yuan Garcia.   Outpatient Pulmonary follow up:PFT's and 6 minute walk to further characterize deficits. Suspect more of restrictive disorder        Subjective/Interval History:   I have reviewed the flowsheet and previous days notes. Reviewed interval history. Discussed management with nursing staff.    09/18/22  LOS: 9    Right HD-IJC placed 9/16/22  HD first treatment 9/17/22  No significant issues overnight  BM overnight  Creat improved  PAP  Patient states that she still feels tired and short of breath and does not see much difference since her HD yesterday. Plan for HD tomorrow. I discussed case briefly with Dr. Brooke Santiago  Patient looks to have good respiratory mechanics and does not appear in respiratory distress, but I do hear diffuse wheezing on exam  No sputum , No hemoptysis, No cough  Afebrile  She is tolerating PO- ate breakfast with no issue  Patient did not use BIPAP again last night  In regards to PAP therapy. The patient was seeing Dr. Smitha Fu. She was using a [employer] - PAP device: unknown settings and mode. The patient's device was then recalled and the patient stopped using her device. She reports that she did not know she had to register her device. She does not know who her DME was. Prior to recall, the patient reports using her device without difficulty.  No Intolerance issues      Patient Vitals for the past 24 hrs:   Temp Pulse Resp BP SpO2   09/18/22 0714 99.2 °F (37.3 °C) 73 15 (!) 128/55 96 %   09/18/22 0333 99 °F (37.2 °C) 77 18 (!) 116/52 95 %   09/17/22 2331 98.9 °F (37.2 °C) 76 18 (!) 135/54 98 %   09/17/22 2002 -- -- -- -- 97 %   09/17/22 1950 99 °F (37.2 °C) 77 18 (!) 122/54 97 %   09/17/22 1606 97.9 °F (36.6 °C) 72 22 (!) 157/70 99 %   09/17/22 1444 -- 74 15 (!) 135/96 100 %   09/17/22 1410 96.8 °F (36 °C) 66 18 138/61 --   09/17/22 1408 -- 65 -- 128/70 --   09/17/22 1400 -- 60 -- 129/67 --   09/17/22 1345 -- (!) 58 -- 130/70 --   09/17/22 1330 -- (!) 59 -- 132/63 --   09/17/22 1315 -- 60 -- 137/88 --   09/17/22 1300 -- 60 -- (!) 123/96 --   09/17/22 1245 -- (!) 58 -- (!) 141/60 --   22 1230 -- (!) 57 -- 123/82 --   22 1215 -- 61 -- (!) 154/63 --   22 1200 -- 63 -- (!) 148/72 --   22 1145 -- 61 -- (!) 148/75 --   22 1130 -- 62 -- 138/71 --   22 1115 -- 62 -- (!) 161/66 --   22 1100 -- 62 -- 137/70 --   22 1045 -- 63 -- 139/67 --   22 1034 -- 65 -- 127/66 --   22 1032 98 °F (36.7 °C) 66 18 (!) 129/59 --     Inpat Anti-Infectives (From admission, onward)      None              ROS:Pertinent items are noted in HPI. Orders reviewed including medications. Changes made if indicated. Telemetry monitor reviewed at the bedside. Objective:   Vital Signs:    Visit Vitals  BP (!) 128/55   Pulse 73   Temp 99.2 °F (37.3 °C)   Resp 15   Ht 5' 4\" (1.626 m)   Wt 103.5 kg (228 lb 1.6 oz)   SpO2 96%   BMI 39.15 kg/m²       O2 Device: None (Room air)   O2 Flow Rate (L/min): 2 l/min   Temp (24hrs), Av.4 °F (36.9 °C), Min:96.8 °F (36 °C), Max:99.2 °F (37.3 °C)       Intake/Output:   Last shift:       07 - 1900  In: 360 [P.O.:360]  Out: 600 [Urine:600]  Last 3 shifts: 1901 -  0700  In: 840 [P.O.:840]  Out: 2520 [Urine:1020]    Intake/Output Summary (Last 24 hours) at 2022 0937  Last data filed at 2022 0857  Gross per 24 hour   Intake 1200 ml   Output 3120 ml   Net -1920 ml        Physical Exam:    General:  Alert, cooperative, in no distress, appears stated age. Obese    Head:  Normocephalic, without obvious abnormality, atraumatic. Eyes:  ANicteric, PERRL,   Nose: Nares normal. Mucosa normal. No drainage or sinus tenderness. Throat: Lips, mucosa, and tongue normal.  No exudate   Neck: Supple, symmetrical, trachea midline, no adenopathy, thyroid: no enlargment/tenderness/nodules, no carotid bruit and no JVD ,    Back:   Symmetric    Lungs:   Bilateral auscultation distant breath sounds, no rales, + diffuse wheezes heard bilaterally    Chest wall:  No tenderness or deformity.  NO intercostal retractions Heart:  Regular rate and rhythm, S1, S2 normal, no murmur, click, rub or gallop. Abdomen:   Soft, non-tender. Bowel sounds normal. No masses,  No organomegaly. NO paradoxical motion   Extremities: normal, atraumatic, no cyanosis, trace pedal edema   Pulses: 2+ and symmetric all extremities. Skin: Skin color, texture, turgor normal. No rashes or lesions. NO clubbing   Lymph nodes: Cervical, supraclavicular nodes normal.   Neurologic: Grossly nonfocal      :        Devices: Right IJ-HD Cath            Drips:    DATA:  Labs:  Recent Labs     09/17/22  0548 09/16/22  0511   WBC 11.2 13.9*   HGB 8.8* 8.5*   HCT 28.9* 28.5*    337     Recent Labs     09/18/22  0534 09/17/22  0548 09/16/22  0511    136 135*   K 4.5 5.7* 5.6*    108 106   CO2 29 23 22   * 139* 105*   BUN 38* 78* 94*   CREA 1.57* 1.78* 2.49*   CA 8.8 9.3 9.2     Lab Results   Component Value Date/Time    TSH 0.21 (L) 03/19/2021 04:00 AM    Triiodothyronine (T3), free 3.2 02/09/2016 08:47 AM    T4, Free 1.2 10/20/2019 04:23 AM     Lab Results   Component Value Date/Time    NT pro-BNP 2,013 (H) 09/09/2022 07:34 AM    NT pro-BNP 1,871 (H) 08/08/2022 01:48 AM    NT pro-BNP 3,952 (H) 07/08/2022 09:53 AM    NT pro-BNP 1,020 (H) 03/18/2021 01:29 AM    NT pro-BNP 4,929 (H) 10/22/2019 03:30 AM         No results for input(s): PH, PCO2, PO2, HCO3, FIO2 in the last 72 hours.     MICRO:  Results       Procedure Component Value Units Date/Time    CULTURE, BLOOD [[id number]] Collected: 09/09/22 0840    Order Status: Completed Specimen: Blood Updated: 09/15/22 0619     Special Requests: NO SPECIAL REQUESTS        Culture result: NO GROWTH 6 DAYS       CULTURE, BLOOD [[id number]] Collected: 09/09/22 0830    Order Status: Completed Specimen: Blood Updated: 09/15/22 0619     Special Requests: NO SPECIAL REQUESTS        Culture result: NO GROWTH 6 DAYS               Imaging:              XR Results (most recent):  Results from [facility] [place] encounter on 09/09/22    XR CHEST PORT    Narrative  EXAM: XR CHEST PORT    CLINICAL INDICATION/HISTORY: 81 years Female. S/P Dialysis catheter insertion. Additional History: None    TECHNIQUE: Frontal view of the chest    COMPARISON: 9/12/2022    FINDINGS:    Underpenetration of the lower chest secondary to body habitus. Interval placement of a right IJ approach dual-lumen vascular catheter. The tip  is approximately 5.5 cm distal to the cavoatrial junction. The cardiac silhouette is mild to moderately enlarged increase in size from  prior. Moderate pulmonary vascular congestion and diffuse bilateral interstitial  opacities, increased from prior. Aortic calcifications noted. No definite evidence of pleural effusion or pneumothorax. No acute osseous abnormality appreciated. Impression  Interval dialysis catheter placement, with tip approximately 5.5 cm distal to  the cavoatrial junction; consider retracting to this level. No pneumothorax  detected. Interval enlargement cardiac silhouette enlargement. Interval increased moderate  pulmonary vascular congestion with diffuse bilateral interstitial edema versus  infiltrates. Complex decision making was made in the evaluation and management plans during this consultation. More than 50% of time was spent in counseling and coordination of care including review of data and discussion with other team members.       Dano Pete DO, [place] [id number] [facility]  Pulmonary, Critical Care, and Sleep Medicine

## 2022-09-18 NOTE — PROGRESS NOTES
Bedside report received on pt    2257: Pt assailed to the bathroom w/o O2, on room air, she tolerate well. 6425: Pt transition from room air to BIPAP, O2 at 95%    0442: Pt ambulate to the bathroom on room air twice, O2 sat at 95 to 97%.

## 2022-09-18 NOTE — PROGRESS NOTES
0700: Bedside and verbal shift report given to Michelle Bermudez (oncoming nurse) by Winsome Salinas (off going nurse). Report included the following information SBAR, Intake/Output, Mar and Cardiac rhythm. 0830: Patient medicated per mar. 1231: Patient c/o pain 9/10. Medicated per mar. 1500: Patient resting in bed. No complaints at this time. 1834: Patient ambulated in hallway. Fairly tolerated. Spo2 89 on rm air.

## 2022-09-18 NOTE — PROGRESS NOTES
Hospitalist Progress Note    Patient: Virgilio Perez Age: 81 y.o. : 1950 MR#: [id number] SSN: [id number]  Date/Time: 2022 10:52 AM    DOA: 2022  PCP: Tracey Barroso MD    Subjective:     Not feeling great today. Feels generally weak and easily fatigue with ambulation and walking to commode   Did not sleep with CPAP last night, few wheezing  Has tolerated HD recently.   Renal function improved  No fever      Interval Hospital Course:        ROS:  No current fever/chills, no headache, no dizziness, no facial pain, no sinus congestion,   No swallowing pain, No chest pain, no palpitation, + shortness of breath, no abd pain,  No diarrhea, no urinary complaint, no leg pain or swelling       Assessment/Plan:   Acute on chronic diastolic CHF  Hypertensive emergency  Moderate COPD  Obesity BMI 39  Type 2 diabetes mellitus  CARA on CKD 3  Uremia  TESSA, tolerate BiPAP nightly  Hyperkalemia  Former tobacco smoker      She continues to have wheezing, benefit from bronchodilator, budesonide/arformoterol nebs  Spoke with her to advocate for her to using BiPAP at night  Continue volume control with dialysis, spoke with nephrology to resume dialysis  Otherwise she continue on aspirin, atorvastatin, clonidine, diltiazem, hydralazine  Lantus plus ISS  She continues on [facility]  Cardiology and pulmonary she follows  PT OT needed  Full code      Disposition planning: Likely home in 1 to 2-days  Family updated (none)  Additional Notes:    Time spent > 30 minutes    Case discussed with:  [x]Patient  []Family  [x]Nursing  []Case Management  DVT Prophylaxis:  []Lovenox  []Hep SQ  [x]SCDs  []Coumadin   []On Heparin gtt    Signed By: Olga Lopez MD     2022 10:52 AM              Objective:   VS: Visit Vitals  BP (!) 128/55   Pulse 73   Temp 99.2 °F (37.3 °C)   Resp 15   Ht 5' 4\" (1.626 m)   Wt 103.5 kg (228 lb 1.6 oz)   SpO2 96%   BMI 39.15 kg/m²      Tmax/24hrs: Temp (24hrs), Av.5 °F (36.9 °C), Min:96.8 °F (36 °C), Max:99.2 °F (37.3 °C)    Intake/Output Summary (Last 24 hours) at 9/18/2022 1052  Last data filed at 9/18/2022 0857  Gross per 24 hour   Intake 840 ml   Output 2620 ml   Net -1780 ml     Tele: Sinus  General:  Cooperative, Not in acute distress, speaks in short sentence while in bed  HEENT: PERRL, EOMI, supple neck, no JVD, dry oral mucosa  Cardiovascular: S1S2 regular, no rub/gallop, temporary dialysis catheter  Pulmonary: air entry bilaterally,++ wheezing, + crackle  GI:  Soft, non tender, non distended, +bs, no guarding   Extremities: + Pedal edema, +distal pulses appreciated   Neuro: AOx3, moving all extremities, no gross deficit.       Additional:       Current Facility-Administered Medications   Medication Dose Route Frequency    insulin glargine (LANTUS) injection 15 Units  15 Units SubCUTAneous ACL    0.9% sodium chloride infusion 250 mL  250 mL IntraVENous DIALYSIS PRN    hydrALAZINE (APRESOLINE) tablet 10 mg  10 mg Oral TID    sodium zirconium cyclosilicate (LOKELMA) powder packet 5 g  5 g Oral DAILY    dilTIAZem ER (CARDIZEM CD) capsule 120 mg  120 mg Oral DAILY    heparin (porcine) injection 5,000 Units  5,000 Units SubCUTAneous Q8H    [Held by provider] furosemide (LASIX) tablet 40 mg  40 mg Oral DAILY    cloNIDine HCL (CATAPRES) tablet 0.1 mg  0.1 mg Oral BID    arformoteroL (BROVANA) neb solution 15 mcg  15 mcg Nebulization BID RT    budesonide (PULMICORT) 500 mcg/2 ml nebulizer suspension  500 mcg Nebulization BID RT    albuterol-ipratropium (DUO-NEB) 2.5 MG-0.5 MG/3 ML  3 mL Nebulization Q4H PRN    aspirin chewable tablet 81 mg  81 mg Oral DAILY    atorvastatin (LIPITOR) tablet 40 mg  40 mg Oral QHS    gabapentin (NEURONTIN) capsule 400 mg  400 mg Oral DAILY    sodium chloride (NS) flush 5-40 mL  5-40 mL IntraVENous Q8H    sodium chloride (NS) flush 5-40 mL  5-40 mL IntraVENous PRN    acetaminophen (TYLENOL) tablet 650 mg  650 mg Oral Q6H PRN    Or    acetaminophen (TYLENOL) suppository 650 mg  650 mg Rectal Q6H PRN    polyethylene glycol (MIRALAX) packet 17 g  17 g Oral DAILY PRN    ondansetron (ZOFRAN ODT) tablet 4 mg  4 mg Oral Q8H PRN    Or    ondansetron (ZOFRAN) injection 4 mg  4 mg IntraVENous Q6H PRN    insulin lispro (HUMALOG) injection   SubCUTAneous AC&HS    glucose chewable tablet 16 g  16 g Oral PRN    glucagon (GLUCAGEN) injection 1 mg  1 mg IntraMUSCular PRN    dextrose 10% infusion 0-250 mL  0-250 mL IntraVENous PRN    naloxone (NARCAN) injection 0.4 mg  0.4 mg IntraVENous EVERY 2 MINUTES AS NEEDED    oxyCODONE-acetaminophen (PERCOCET) 5-325 mg per tablet 1 Tablet  1 Tablet Oral Q4H PRN            Lab/Data Review:  Labs: Results:       Chemistry Recent Labs     09/18/22  0534 09/17/22  0548 09/16/22  0511   * 139* 105*    136 135*   K 4.5 5.7* 5.6*    108 106   CO2 29 23 22   BUN 38* 78* 94*   CREA 1.57* 1.78* 2.49*   BUCR 24* 44* 38*   AGAP 4 5 7   CA 8.8 9.3 9.2     No results for input(s): TBIL, ALT, ALKP, TP, ALB, GLOB, AGRAT in the last 72 hours. No lab exists for component: SGOT   CBC w/Diff Recent Labs     09/17/22  0548 09/16/22  0511   WBC 11.2 13.9*   RBC 3.03* 3.05*   HGB 8.8* 8.5*   HCT 28.9* 28.5*   MCV 95.4 93.4   MCH 29.0 27.9   MCHC 30.4* 29.8*   RDW 17.1* 17.4*    337   GRANS 77*  --    LYMPH 11*  --    EOS 0  --       Coagulation No results for input(s): PTP, INR, APTT, INREXT in the last 72 hours.     Iron/Ferritin Lab Results   Component Value Date/Time    Iron 44 (L) 03/09/2022 02:27 AM    TIBC 233 (L) 03/09/2022 02:27 AM    Iron % saturation 19 (L) 03/09/2022 02:27 AM    Ferritin 80 08/09/2022 12:09 PM       BNP    Cardiac Enzymes Lab Results   Component Value Date/Time    CK 71 12/16/2021 09:55 PM    CK - MB 2.0 12/16/2021 09:55 PM    CK-MB Index 2.8 12/16/2021 09:55 PM    Troponin-I, QT <0.02 03/19/2021 04:00 AM        Lactic Acid    Thyroid Studies          All Micro Results       Procedure Component Value Units Date/Time CULTURE, BLOOD [[id number]] Collected: 09/09/22 0830    Order Status: Completed Specimen: Blood Updated: 09/15/22 0619     Special Requests: NO SPECIAL REQUESTS        Culture result: NO GROWTH 6 DAYS       CULTURE, BLOOD [[id number]] Collected: 09/09/22 0840    Order Status: Completed Specimen: Blood Updated: 09/15/22 0619     Special Requests: NO SPECIAL REQUESTS        Culture result: NO GROWTH 6 DAYS                 Images:    CT (Most Recent). XRAY (Most Recent) XR Results (most recent):  Results from Hospital Encounter encounter on 09/09/22    XR CHEST PORT    Narrative  EXAM: XR CHEST PORT    CLINICAL INDICATION/HISTORY: 81 years Female. S/P Dialysis catheter insertion. Additional History: None    TECHNIQUE: Frontal view of the chest    COMPARISON: 9/12/2022    FINDINGS:    Underpenetration of the lower chest secondary to body habitus. Interval placement of a right IJ approach dual-lumen vascular catheter. The tip  is approximately 5.5 cm distal to the cavoatrial junction. The cardiac silhouette is mild to moderately enlarged increase in size from  prior. Moderate pulmonary vascular congestion and diffuse bilateral interstitial  opacities, increased from prior. Aortic calcifications noted. No definite evidence of pleural effusion or pneumothorax. No acute osseous abnormality appreciated. Impression  Interval dialysis catheter placement, with tip approximately 5.5 cm distal to  the cavoatrial junction; consider retracting to this level. No pneumothorax  detected. Interval enlargement cardiac silhouette enlargement. Interval increased moderate  pulmonary vascular congestion with diffuse bilateral interstitial edema versus  infiltrates. EKG Results for orders placed or performed in visit on 08/23/18   AMB POC EKG ROUTINE W/ 12 LEADS, INTER & REP     Status: None    Impression    See progress note.         2D ECHO

## 2022-09-18 NOTE — PROGRESS NOTES
Cardiology Progress Note    Admit Date: 9/9/2022  Attending Cardiologist: Dr. Miladys Delgado Problems    Diagnosis Date Noted    CHF (congestive heart failure) ([facility] [address] 09/09/2022    Acute respiratory failure with hypoxia ([facility] [address] 10/18/2019      -Acute hypoxic respiratory failure in setting of below, s/p bipap. Improved. -Flash pulmonary edema in setting of severely elevated blood pressures.   -Chest pain, atypical with pleuritic component. MI r/o with serial negative cardiac enzymes. No acute ischemic changes on ECG. -Hypertensive crisis, Max /121 mmHg. Improved. -a/c HFpEF. Last Echo in June 2022 with normal LVEF. Renal duplex with Elevated velocities noted within the left renal artery origin. -CAD, s/p prox LAD PCI/YADIRA (2018); cardiac cath 10/2019 with findings as follows. LAD: Previously placed ostial to proximal LAD stent is widely patent. D1 with ostial 50% stenosis. -CKD  -Anemia  -HTN  -DM  -Hypercholesterolemia, on statin  -COPD        Primary cardiologist is Dr. Doc Ricks:     Blood pressure is now controlled. Patient had a dialysis yesterday and her urine output is also in the nonoliguric range now. Continue present medications. Out of bed and ambulate. Continue aspirin and statin for CAD    Hopefully home soon      Subjective:     No new complaints.      Objective:      Patient Vitals for the past 8 hrs:   Temp Pulse Resp BP SpO2   09/18/22 1050 98.2 °F (36.8 °C) 67 19 115/81 94 %           Patient Vitals for the past 96 hrs:   Weight   09/17/22 0354 103.5 kg (228 lb 1.6 oz)   09/16/22 0421 106.6 kg (235 lb)   09/15/22 0400 105.3 kg (232 lb 3.2 oz)         TELE: SR               Current Facility-Administered Medications   Medication Dose Route Frequency Last Admin    ipratropium (ATROVENT) 0.02 % nebulizer solution 0.5 mg  0.5 mg Nebulization Q6H RT 0.5 mg at 09/18/22 1527    insulin glargine (LANTUS) injection 15 Units  15 Units SubCUTAneous ACL 15 Units at 09/18/22 1227    0.9% sodium chloride infusion 250 mL  250 mL IntraVENous DIALYSIS PRN      hydrALAZINE (APRESOLINE) tablet 10 mg  10 mg Oral TID 10 mg at 09/18/22 0830    sodium zirconium cyclosilicate (LOKELMA) powder packet 5 g  5 g Oral DAILY 5 g at 09/18/22 0830    dilTIAZem ER (CARDIZEM CD) capsule 120 mg  120 mg Oral DAILY 120 mg at 09/18/22 0830    heparin (porcine) injection 5,000 Units  5,000 Units SubCUTAneous Q8H      [Held by provider] furosemide (LASIX) tablet 40 mg  40 mg Oral DAILY      cloNIDine HCL (CATAPRES) tablet 0.1 mg  0.1 mg Oral BID 0.1 mg at 09/18/22 0830    arformoteroL (BROVANA) neb solution 15 mcg  15 mcg Nebulization BID RT 15 mcg at 09/18/22 0847    budesonide (PULMICORT) 500 mcg/2 ml nebulizer suspension  500 mcg Nebulization BID  mcg at 09/18/22 0847    albuterol-ipratropium (DUO-NEB) 2.5 MG-0.5 MG/3 ML  3 mL Nebulization Q4H PRN      aspirin chewable tablet 81 mg  81 mg Oral DAILY 81 mg at 09/18/22 0830    atorvastatin (LIPITOR) tablet 40 mg  40 mg Oral QHS 40 mg at 09/17/22 2152    gabapentin (NEURONTIN) capsule 400 mg  400 mg Oral DAILY 400 mg at 09/18/22 0830    sodium chloride (NS) flush 5-40 mL  5-40 mL IntraVENous Q8H 10 mL at 09/18/22 0539    sodium chloride (NS) flush 5-40 mL  5-40 mL IntraVENous PRN      acetaminophen (TYLENOL) tablet 650 mg  650 mg Oral Q6H  mg at 09/09/22 2002    Or    acetaminophen (TYLENOL) suppository 650 mg  650 mg Rectal Q6H PRN      polyethylene glycol (MIRALAX) packet 17 g  17 g Oral DAILY PRN 17 g at 09/18/22 1232    ondansetron (ZOFRAN ODT) tablet 4 mg  4 mg Oral Q8H PRN      Or    ondansetron (ZOFRAN) injection 4 mg  4 mg IntraVENous Q6H PRN      insulin lispro (HUMALOG) injection   SubCUTAneous AC&HS 2 Units at 09/18/22 1227    glucose chewable tablet 16 g  16 g Oral PRN      glucagon (GLUCAGEN) injection 1 mg  1 mg IntraMUSCular PRN      dextrose 10% infusion 0-250 mL  0-250 mL IntraVENous PRN      naloxone (NARCAN) injection 0.4 mg  0.4 mg IntraVENous EVERY 2 MINUTES AS NEEDED      oxyCODONE-acetaminophen (PERCOCET) 5-325 mg per tablet 1 Tablet  1 Tablet Oral Q4H PRN 1 Tablet at 09/18/22 1231         Intake/Output Summary (Last 24 hours) at 9/18/2022 1530  Last data filed at 9/18/2022 1402  Gross per 24 hour   Intake 840 ml   Output 1120 ml   Net -280 ml         Physical Exam:  General:  alert, cooperative, no distress, appears stated age  Neck:  no JVD  Lungs: Clear without any rales or rhonchi  Heart:  regular rate and rhythm  Abdomen:  abdomen is soft without significant tenderness, masses, organomegaly or guarding  Extremities:  extremities normal, atraumatic, no cyanosis or edema    Visit Vitals  /81   Pulse 67   Temp 98.2 °F (36.8 °C)   Resp 19   Ht 5' 4\" (1.626 m)   Wt 103.5 kg (228 lb 1.6 oz)   SpO2 94%   BMI 39.15 kg/m²       Data Review:     Labs: Results:       Chemistry Recent Labs     09/18/22  0534 09/17/22  0548 09/16/22  0511   * 139* 105*    136 135*   K 4.5 5.7* 5.6*    108 106   CO2 29 23 22   BUN 38* 78* 94*   CREA 1.57* 1.78* 2.49*   CA 8.8 9.3 9.2   AGAP 4 5 7   BUCR 24* 44* 38*        CBC w/Diff Recent Labs     09/17/22  0548 09/16/22  0511   WBC 11.2 13.9*   RBC 3.03* 3.05*   HGB 8.8* 8.5*   HCT 28.9* 28.5*    337   GRANS 77*  --    LYMPH 11*  --    EOS 0  --         Cardiac Enzymes No results found for: CPK, CK, CKMMB, CKMB, RCK3, CKMBT, CKNDX, CKND1, GODFREY, TROPT, TROIQ, SANG, TROPT, TNIPOC, BNP, BNPP   Coagulation No results for input(s): PTP, INR, APTT, INREXT, INREXT in the last 72 hours.       Lipid Panel Lab Results   Component Value Date/Time    Cholesterol, total 152 10/10/2019 12:00 AM    HDL Cholesterol 46 10/10/2019 12:00 AM    LDL, calculated 86 10/10/2019 12:00 AM    VLDL, calculated 20 10/10/2019 12:00 AM    Triglyceride 101 10/10/2019 12:00 AM    CHOL/HDL Ratio 5.6 (H) 05/21/2018 06:23 AM      BNP No results found for: BNP, BNPP, XBNPT   Liver Enzymes No results for input(s): TP, ALB, TBIL, AP in the last 72 hours.     No lab exists for component: SGOT, GPT, DBIL     Thyroid Studies Lab Results   Component Value Date/Time    TSH 0.21 (L) 03/19/2021 04:00 AM          Signed By: Cristina Leger MD     September 18, 2022

## 2022-09-18 NOTE — PROGRESS NOTES
Problem: Falls - Risk of  Goal: *Absence of Falls  Description: Document Aidanmanav Chapman Fall Risk and appropriate interventions in the flowsheet. Outcome: Progressing Towards Goal  Note: Fall Risk Interventions:  Mobility Interventions: Bed/chair exit alarm         Medication Interventions: Bed/chair exit alarm    Elimination Interventions: Bed/chair exit alarm, Call light in reach              Problem: Pressure Injury - Risk of  Goal: *Prevention of pressure injury  Description: Document Vladislav Scale and appropriate interventions in the flowsheet.   Outcome: Progressing Towards Goal  Note: Pressure Injury Interventions:  Sensory Interventions: Assess changes in LOC, Keep linens dry and wrinkle-free, Minimize linen layers    Moisture Interventions: Maintain skin hydration (lotion/cream)    Activity Interventions: Assess need for specialty bed, PT/OT evaluation    Mobility Interventions: HOB 30 degrees or less    Nutrition Interventions: Document food/fluid/supplement intake

## 2022-09-18 NOTE — PROGRESS NOTES
Progress Note      53-year-old female with past medical history of hypertension, congestive heart failure, admitted for decompensated heart failure, following for renal failure  Subjective      Overnight event noted  Feels ok  Doppler renal artery shows higher PSV at left renal artery origin  Chronic atherosclerotic changes     IMPRESSION:   Acute on chronic kidney injury, rising creatinine and BUN in setting of overdiuresis  CKD stage III with proteinuria, baseline creatinine around 1.5 mg per DL, history of frequent CARA  Uremia  Decompensated heart failure, diastolic heart failure, improving symptoms  Diabetes  Secondary hyperparathyroidism of CKD   PLAN:   HD again tomorrow  D/w Dr. Yumiko Evans     Facility-Administered Medications: None       Current Facility-Administered Medications   Medication Dose Route Frequency    insulin glargine (LANTUS) injection 15 Units  15 Units SubCUTAneous ACL    0.9% sodium chloride infusion 250 mL  250 mL IntraVENous DIALYSIS PRN    hydrALAZINE (APRESOLINE) tablet 10 mg  10 mg Oral TID    sodium zirconium cyclosilicate (LOKELMA) powder packet 5 g  5 g Oral DAILY    dilTIAZem ER (CARDIZEM CD) capsule 120 mg  120 mg Oral DAILY    heparin (porcine) injection 5,000 Units  5,000 Units SubCUTAneous Q8H    [Held by provider] furosemide (LASIX) tablet 40 mg  40 mg Oral DAILY    cloNIDine HCL (CATAPRES) tablet 0.1 mg  0.1 mg Oral BID    arformoteroL (BROVANA) neb solution 15 mcg  15 mcg Nebulization BID RT    budesonide (PULMICORT) 500 mcg/2 ml nebulizer suspension  500 mcg Nebulization BID RT    albuterol-ipratropium (DUO-NEB) 2.5 MG-0.5 MG/3 ML  3 mL Nebulization Q4H PRN    aspirin chewable tablet 81 mg  81 mg Oral DAILY    atorvastatin (LIPITOR) tablet 40 mg  40 mg Oral QHS    gabapentin (NEURONTIN) capsule 400 mg  400 mg Oral DAILY    sodium chloride (NS) flush 5-40 mL  5-40 mL IntraVENous Q8H    sodium chloride (NS) flush 5-40 mL  5-40 mL IntraVENous PRN    acetaminophen (TYLENOL) tablet 650 mg  650 mg Oral Q6H PRN    Or    acetaminophen (TYLENOL) suppository 650 mg  650 mg Rectal Q6H PRN    polyethylene glycol (MIRALAX) packet 17 g  17 g Oral DAILY PRN    ondansetron (ZOFRAN ODT) tablet 4 mg  4 mg Oral Q8H PRN    Or    ondansetron (ZOFRAN) injection 4 mg  4 mg IntraVENous Q6H PRN    insulin lispro (HUMALOG) injection   SubCUTAneous AC&HS    glucose chewable tablet 16 g  16 g Oral PRN    glucagon (GLUCAGEN) injection 1 mg  1 mg IntraMUSCular PRN    dextrose 10% infusion 0-250 mL  0-250 mL IntraVENous PRN    naloxone (NARCAN) injection 0.4 mg  0.4 mg IntraVENous EVERY 2 MINUTES AS NEEDED    oxyCODONE-acetaminophen (PERCOCET) 5-325 mg per tablet 1 Tablet  1 Tablet Oral Q4H PRN       Review of Systems:   As above . Data Review:    Labs: Results:       Chemistry Recent Labs     09/18/22  0534 09/17/22  0548 09/16/22  0511   * 139* 105*    136 135*   K 4.5 5.7* 5.6*    108 106   CO2 29 23 22   BUN 38* 78* 94*   CREA 1.57* 1.78* 2.49*   CA 8.8 9.3 9.2   AGAP 4 5 7   BUCR 24* 44* 38*        CBC w/Diff Recent Labs     09/17/22  0548 09/16/22  0511   WBC 11.2 13.9*   RBC 3.03* 3.05*   HGB 8.8* 8.5*   HCT 28.9* 28.5*    337   GRANS 77*  --    LYMPH 11*  --    EOS 0  --           Coagulation No results for input(s): PTP, INR, APTT, INREXT, INREXT in the last 72 hours. Iron/Ferritin No results for input(s): IRON in the last 72 hours. No lab exists for component: TIBCCALC   BNP No results for input(s): BNPP in the last 72 hours. Cardiac Enzymes No results for input(s): CPK, CKND1, GODFREY in the last 72 hours. No lab exists for component: CKRMB, TROIP   Liver Enzymes No results for input(s): TP, ALB, TBIL, AP in the last 72 hours. No lab exists for component: SGOT, GPT, DBIL   Thyroid Studies Lab Results   Component Value Date/Time    TSH 0.21 (L) 03/19/2021 04:00 AM         EKG: normal EKG, normal sinus rhythm.     Physical Assessment:   Visit Vitals  /81   Pulse 67   Temp 98.2 °F (36.8 °C)   Resp 19   Ht 5' 4\" (1.626 m)   Wt 103.5 kg (228 lb 1.6 oz)   SpO2 94%   BMI 39.15 kg/m²     Weight change:     Intake/Output Summary (Last 24 hours) at 9/18/2022 1213  Last data filed at 9/18/2022 0857  Gross per 24 hour   Intake 840 ml   Output 2620 ml   Net -1780 ml       Physical Exam:   General: comfortable, no acute distress   HEENT sclera anicteric, supple neck, no thyromegaly  CVS: S1S2 heard,  no rub  RS: + air entry b/l,   Abd: Soft, Non tender, Not distended, Positive bowel sounds, no organomegaly, no CVA / supra pubic tenderness  Neuro: non focal, awake, alert , CN II-XII are grossly intact  Extrm: + edema, no cyanosis, clubbing   Skin: no visible  Rash  Musculoskeletal: No gross joints or bone deformities     Procedures/imaging: see electronic medical records for all procedures, Xrays and details which were not copied into this note but were reviewed prior to creation of Yaima Allred MD  September 18, 2022  [facility]  Office [phone]

## 2022-09-19 ENCOUNTER — APPOINTMENT (OUTPATIENT)
Dept: CT IMAGING | Age: 72
DRG: 291 | End: 2022-09-19
Attending: INTERNAL MEDICINE
Payer: MEDICARE

## 2022-09-19 LAB
ANION GAP SERPL CALC-SCNC: 6 MMOL/L (ref 3–18)
BUN SERPL-MCNC: 36 MG/DL (ref 7–18)
BUN/CREAT SERPL: 22 (ref 12–20)
CALCIUM SERPL-MCNC: 8.8 MG/DL (ref 8.5–10.1)
CHLORIDE SERPL-SCNC: 110 MMOL/L (ref 100–111)
CO2 SERPL-SCNC: 26 MMOL/L (ref 21–32)
CREAT SERPL-MCNC: 1.67 MG/DL (ref 0.6–1.3)
GLUCOSE BLD STRIP.AUTO-MCNC: 120 MG/DL (ref 70–110)
GLUCOSE BLD STRIP.AUTO-MCNC: 148 MG/DL (ref 70–110)
GLUCOSE BLD STRIP.AUTO-MCNC: 204 MG/DL (ref 70–110)
GLUCOSE SERPL-MCNC: 213 MG/DL (ref 74–99)
POTASSIUM SERPL-SCNC: 4.6 MMOL/L (ref 3.5–5.5)
SODIUM SERPL-SCNC: 142 MMOL/L (ref 136–145)

## 2022-09-19 PROCEDURE — 82962 GLUCOSE BLOOD TEST: CPT

## 2022-09-19 PROCEDURE — 99232 SBSQ HOSP IP/OBS MODERATE 35: CPT | Performed by: INTERNAL MEDICINE

## 2022-09-19 PROCEDURE — 94761 N-INVAS EAR/PLS OXIMETRY MLT: CPT

## 2022-09-19 PROCEDURE — 90935 HEMODIALYSIS ONE EVALUATION: CPT

## 2022-09-19 PROCEDURE — 80048 BASIC METABOLIC PNL TOTAL CA: CPT

## 2022-09-19 PROCEDURE — 74011636637 HC RX REV CODE- 636/637: Performed by: HOSPITALIST

## 2022-09-19 PROCEDURE — 74011250637 HC RX REV CODE- 250/637: Performed by: INTERNAL MEDICINE

## 2022-09-19 PROCEDURE — 74011000250 HC RX REV CODE- 250: Performed by: INTERNAL MEDICINE

## 2022-09-19 PROCEDURE — 70450 CT HEAD/BRAIN W/O DYE: CPT

## 2022-09-19 PROCEDURE — 94640 AIRWAY INHALATION TREATMENT: CPT

## 2022-09-19 PROCEDURE — 99233 SBSQ HOSP IP/OBS HIGH 50: CPT | Performed by: INTERNAL MEDICINE

## 2022-09-19 PROCEDURE — 65270000046 HC RM TELEMETRY

## 2022-09-19 PROCEDURE — 36415 COLL VENOUS BLD VENIPUNCTURE: CPT

## 2022-09-19 PROCEDURE — 74011250637 HC RX REV CODE- 250/637: Performed by: PHYSICIAN ASSISTANT

## 2022-09-19 PROCEDURE — 94660 CPAP INITIATION&MGMT: CPT

## 2022-09-19 RX ADMIN — ATORVASTATIN CALCIUM 40 MG: 40 TABLET, FILM COATED ORAL at 21:32

## 2022-09-19 RX ADMIN — SODIUM CHLORIDE, PRESERVATIVE FREE 10 ML: 5 INJECTION INTRAVENOUS at 21:40

## 2022-09-19 RX ADMIN — OXYCODONE HYDROCHLORIDE AND ACETAMINOPHEN 1 TABLET: 5; 325 TABLET ORAL at 04:55

## 2022-09-19 RX ADMIN — HYDRALAZINE HYDROCHLORIDE 10 MG: 10 TABLET, FILM COATED ORAL at 21:32

## 2022-09-19 RX ADMIN — BUDESONIDE 500 MCG: 0.5 SUSPENSION RESPIRATORY (INHALATION) at 21:34

## 2022-09-19 RX ADMIN — Medication 15 UNITS: at 15:19

## 2022-09-19 RX ADMIN — SODIUM CHLORIDE, PRESERVATIVE FREE 10 ML: 5 INJECTION INTRAVENOUS at 15:30

## 2022-09-19 RX ADMIN — SODIUM CHLORIDE, PRESERVATIVE FREE 10 ML: 5 INJECTION INTRAVENOUS at 05:35

## 2022-09-19 RX ADMIN — ARFORMOTEROL TARTRATE 15 MCG: 15 SOLUTION RESPIRATORY (INHALATION) at 21:34

## 2022-09-19 RX ADMIN — IPRATROPIUM BROMIDE 0.5 MG: 0.5 SOLUTION RESPIRATORY (INHALATION) at 21:35

## 2022-09-19 RX ADMIN — HYDRALAZINE HYDROCHLORIDE 10 MG: 10 TABLET, FILM COATED ORAL at 15:20

## 2022-09-19 RX ADMIN — Medication 6 UNITS: at 21:37

## 2022-09-19 RX ADMIN — OXYCODONE HYDROCHLORIDE AND ACETAMINOPHEN 1 TABLET: 5; 325 TABLET ORAL at 15:19

## 2022-09-19 RX ADMIN — IPRATROPIUM BROMIDE 0.5 MG: 0.5 SOLUTION RESPIRATORY (INHALATION) at 01:05

## 2022-09-19 RX ADMIN — CLONIDINE HYDROCHLORIDE 0.1 MG: 0.1 TABLET ORAL at 21:33

## 2022-09-19 RX ADMIN — GABAPENTIN 400 MG: 400 CAPSULE ORAL at 15:27

## 2022-09-19 RX ADMIN — CLONIDINE HYDROCHLORIDE 0.1 MG: 0.1 TABLET ORAL at 15:29

## 2022-09-19 RX ADMIN — DILTIAZEM HYDROCHLORIDE 120 MG: 120 CAPSULE, COATED, EXTENDED RELEASE ORAL at 15:27

## 2022-09-19 NOTE — PROGRESS NOTES
INTERVENTION:  HEMODYNAMIC STABILIZATION  MAINTAIN BP WNL WHILE ON HD. INTERVENTION:  FLUID MANAGEMENT  WILL ATTEMPT 2500 ML TOTAL FLUID REMOVAL AS TOLERATED. INTERVENTION:  METABOLIC/ELECTROLYTE MANAGEMENT  2.0 POTASSIUM 2.5 CALCIUM DIALYSATE USED WITH HD TODAY. INTERVENTION:  HEMODIALYSIS ACCESS SITE MANAGEMENT  RIGHT SIDE TDC ACCESSED USING ASEPTIC TECHNIQUE. GOAL:  SIGNS AND SYMPTOMS OF LISTED POTENTIAL PROBLEMS WILL BE ABSENT OR MANAGEABLE. OUTCOME:  PROGRESSING. HD PLANNED FOR 3 HOURS TODAY.

## 2022-09-19 NOTE — PROGRESS NOTES
Progress Note      68-year-old female with past medical history of hypertension, congestive heart failure, admitted for decompensated heart failure, following for renal failure  Subjective      Overnight event noted  Feels ok  Doppler renal artery shows higher PSV at left renal artery origin  Chronic atherosclerotic changes     IMPRESSION:   Acute on chronic kidney injury, rising creatinine and BUN in setting of overdiuresis  CKD stage III with proteinuria, baseline creatinine around 1.5 mg per DL, history of frequent CARA  Uremia  Decompensated heart failure, diastolic heart failure, improving symptoms  Diabetes  Secondary hyperparathyroidism of CKD   PLAN:   on 9/19/2022, I saw and examined patient during hemodialysis treatment. The patient was receiving hemodialysis for treatment of  renal failure. I have also reviewed vital signs, intake and output, lab results and recent events, and agreed with today's dialysis order. Will rpt dialysis tomorrow. Plan is to stop dialysis on discharge and follow up as OP.  D/c tomorrow post dialysis if stable otherwise     Facility-Administered Medications: None       Current Facility-Administered Medications   Medication Dose Route Frequency    ipratropium (ATROVENT) 0.02 % nebulizer solution 0.5 mg  0.5 mg Nebulization Q6H RT    insulin glargine (LANTUS) injection 15 Units  15 Units SubCUTAneous ACL    0.9% sodium chloride infusion 250 mL  250 mL IntraVENous DIALYSIS PRN    hydrALAZINE (APRESOLINE) tablet 10 mg  10 mg Oral TID    sodium zirconium cyclosilicate (LOKELMA) powder packet 5 g  5 g Oral DAILY    dilTIAZem ER (CARDIZEM CD) capsule 120 mg  120 mg Oral DAILY    [Held by provider] furosemide (LASIX) tablet 40 mg  40 mg Oral DAILY    cloNIDine HCL (CATAPRES) tablet 0.1 mg  0.1 mg Oral BID    arformoteroL (BROVANA) neb solution 15 mcg  15 mcg Nebulization BID RT    budesonide (PULMICORT) 500 mcg/2 ml nebulizer suspension  500 mcg Nebulization BID RT albuterol-ipratropium (DUO-NEB) 2.5 MG-0.5 MG/3 ML  3 mL Nebulization Q4H PRN    aspirin chewable tablet 81 mg  81 mg Oral DAILY    atorvastatin (LIPITOR) tablet 40 mg  40 mg Oral QHS    gabapentin (NEURONTIN) capsule 400 mg  400 mg Oral DAILY    sodium chloride (NS) flush 5-40 mL  5-40 mL IntraVENous Q8H    sodium chloride (NS) flush 5-40 mL  5-40 mL IntraVENous PRN    acetaminophen (TYLENOL) tablet 650 mg  650 mg Oral Q6H PRN    Or    acetaminophen (TYLENOL) suppository 650 mg  650 mg Rectal Q6H PRN    polyethylene glycol (MIRALAX) packet 17 g  17 g Oral DAILY PRN    ondansetron (ZOFRAN ODT) tablet 4 mg  4 mg Oral Q8H PRN    Or    ondansetron (ZOFRAN) injection 4 mg  4 mg IntraVENous Q6H PRN    insulin lispro (HUMALOG) injection   SubCUTAneous AC&HS    glucose chewable tablet 16 g  16 g Oral PRN    glucagon (GLUCAGEN) injection 1 mg  1 mg IntraMUSCular PRN    dextrose 10% infusion 0-250 mL  0-250 mL IntraVENous PRN    naloxone (NARCAN) injection 0.4 mg  0.4 mg IntraVENous EVERY 2 MINUTES AS NEEDED    oxyCODONE-acetaminophen (PERCOCET) 5-325 mg per tablet 1 Tablet  1 Tablet Oral Q4H PRN       Review of Systems:   As above . Data Review:    Labs: Results:       Chemistry Recent Labs     09/19/22  0515 09/18/22  0534 09/17/22  0548   * 123* 139*    144 136   K 4.6 4.5 5.7*    111 108   CO2 26 29 23   BUN 36* 38* 78*   CREA 1.67* 1.57* 1.78*   CA 8.8 8.8 9.3   AGAP 6 4 5   BUCR 22* 24* 44*        CBC w/Diff Recent Labs     09/17/22  0548   WBC 11.2   RBC 3.03*   HGB 8.8*   HCT 28.9*      GRANS 77*   LYMPH 11*   EOS 0          Coagulation No results for input(s): PTP, INR, APTT, INREXT, INREXT in the last 72 hours. Iron/Ferritin No results for input(s): IRON in the last 72 hours. No lab exists for component: TIBCCALC   BNP No results for input(s): BNPP in the last 72 hours. Cardiac Enzymes No results for input(s): CPK, CKND1, GODFREY in the last 72 hours.     No lab exists for component: CKRMB, TROIP   Liver Enzymes No results for input(s): TP, ALB, TBIL, AP in the last 72 hours. No lab exists for component: SGOT, GPT, DBIL   Thyroid Studies Lab Results   Component Value Date/Time    TSH 0.21 (L) 03/19/2021 04:00 AM         EKG: normal EKG, normal sinus rhythm.     Physical Assessment:   Visit Vitals  BP (!) 153/68   Pulse 65   Temp 98 °F (36.7 °C) (Temporal)   Resp 18   Ht 5' 4\" (1.626 m)   Wt 112.3 kg (247 lb 8 oz)   SpO2 97%   BMI 42.48 kg/m²     Weight change:     Intake/Output Summary (Last 24 hours) at 9/19/2022 1057  Last data filed at 9/19/2022 1037  Gross per 24 hour   Intake 720 ml   Output --   Net 720 ml       Physical Exam:   General: comfortable, no acute distress   HEENT sclera anicteric, supple neck, no thyromegaly  CVS: S1S2 heard,  no rub  RS: + air entry b/l,   Abd: Soft, Non tender, Not distended, Positive bowel sounds, no organomegaly, no CVA / supra pubic tenderness  Neuro: non focal, awake, alert , CN II-XII are grossly intact  Extrm: mild edema, no cyanosis, clubbing   Skin: no visible  Rash  Musculoskeletal: No gross joints or bone deformities     Procedures/imaging: see electronic medical records for all procedures, Xrays and details which were not copied into this note but were reviewed prior to creation of Yulia Ruth MD  September 19, 2022  [facility]  Office [phone]

## 2022-09-19 NOTE — PROGRESS NOTES
Hospitalist Progress Note    Patient: Franky Rivera Age: 81 y.o. : 1950 MR#: [id number] SSN: [id number]  Date/Time: 2022 1:02 PM    DOA: 2022  PCP: Dave Luu MD    Subjective:     She was seen post dialysis today  She expressed that her breathing is slightly better   She continues to have back pain   She stated that percocet took the edge off but did not resolve her pain   No fever  She tolerated BIPAP usage last night     Lab showed decreased BUN, creatinine     Addendum: 600PM  Spoke with  who was concern for her being not able to find her word. Nursing reported that she was given percocet and neurotin for pain control   No report of facial droop or focal deficit     Interval Hospital Course:        ROS:  No current fever/chills, no headache, no dizziness, no facial pain, no sinus congestion,   No swallowing pain, No chest pain, no palpitation, + shortness of breath, no abd pain,  No diarrhea, no urinary complaint, no leg pain or swelling       Assessment/Plan:   Acute on chronic diastolic CHF  Hypertensive emergency  Moderate COPD  Obesity BMI 39  Type 2 diabetes mellitus  CARA on CKD 3  Uremia  TESSA, tolerate BiPAP nightly  Hyperkalemia  Former tobacco smoker  Persistent back pain at the thoracic area    Anxiety due to underlying persistent medical problem. Worsened by her current use of narcotic     Addendum: spoke with nursing to evaluation for concern for CVA if she has focal deficit, then activate stroke code. Otherwise, her symptoms were probably from her narcotic use with her gabapentin.  Will decrease dosing if needed  Will get CT head to rule out acute pathology   If she persists in her symptom, then consider MRI brain   Will get CT chest, non cont, this will given a view of her thoracic area as well where she complains of pain   Continues bronchodilator, budesonide/arformoterol nebs  BiPAP at night  volume control with dialysis  Cont aspirin, atorvastatin, clonidine, diltiazem, hydralazine  Lantus + ISS  Stop ALISON GASamara DIAZ [facility]  Cardiology and pulmonary she follows  PT OT needed  Full code      Disposition planning: Likely home in 1 to 2-days  Family updated (spoke with  for clinical updates)  Additional Notes:    Time spent > 30 minutes    Case discussed with:  [x]Patient  [x]Family  [x]Nursing  []Case Management  DVT Prophylaxis:  []Lovenox  []Hep SQ  [x]SCDs  []Coumadin   []On Heparin gtt    Signed By: Cuate Prasad MD     2022 1:02 PM              Objective:   VS: Visit Vitals  /60   Pulse 63   Temp 98 °F (36.7 °C) (Temporal)   Resp 18   Ht 5' 4\" (1.626 m)   Wt 112.3 kg (247 lb 8 oz)   SpO2 97%   BMI 42.48 kg/m²      Tmax/24hrs: Temp (24hrs), Av.3 °F (36.8 °C), Min:97.8 °F (36.6 °C), Max:99 °F (37.2 °C)    Intake/Output Summary (Last 24 hours) at 2022 1302  Last data filed at 2022 1037  Gross per 24 hour   Intake 720 ml   Output --   Net 720 ml     Tele: Sinus  General:  Cooperative, Not in acute distress, speaks in short sentence while in bed  HEENT: PERRL, EOMI, supple neck, no JVD, dry oral mucosa  Cardiovascular: S1S2 regular, no rub/gallop, temporary dialysis catheter  Pulmonary: air entry bilaterally,++ wheezing, + crackle  GI:  Soft, non tender, non distended, +bs, no guarding   Extremities: + Pedal edema, +distal pulses appreciated   Neuro: AOx3, moving all extremities, no gross deficit.       Additional:       Current Facility-Administered Medications   Medication Dose Route Frequency    ipratropium (ATROVENT) 0.02 % nebulizer solution 0.5 mg  0.5 mg Nebulization Q6H RT    insulin glargine (LANTUS) injection 15 Units  15 Units SubCUTAneous ACL    0.9% sodium chloride infusion 250 mL  250 mL IntraVENous DIALYSIS PRN    hydrALAZINE (APRESOLINE) tablet 10 mg  10 mg Oral TID    sodium zirconium cyclosilicate (LOKELMA) powder packet 5 g  5 g Oral DAILY    dilTIAZem ER (CARDIZEM CD) capsule 120 mg  120 mg Oral DAILY    [Held by provider] furosemide (LASIX) tablet 40 mg  40 mg Oral DAILY    cloNIDine HCL (CATAPRES) tablet 0.1 mg  0.1 mg Oral BID    arformoteroL (BROVANA) neb solution 15 mcg  15 mcg Nebulization BID RT    budesonide (PULMICORT) 500 mcg/2 ml nebulizer suspension  500 mcg Nebulization BID RT    albuterol-ipratropium (DUO-NEB) 2.5 MG-0.5 MG/3 ML  3 mL Nebulization Q4H PRN    aspirin chewable tablet 81 mg  81 mg Oral DAILY    atorvastatin (LIPITOR) tablet 40 mg  40 mg Oral QHS    gabapentin (NEURONTIN) capsule 400 mg  400 mg Oral DAILY    sodium chloride (NS) flush 5-40 mL  5-40 mL IntraVENous Q8H    sodium chloride (NS) flush 5-40 mL  5-40 mL IntraVENous PRN    acetaminophen (TYLENOL) tablet 650 mg  650 mg Oral Q6H PRN    Or    acetaminophen (TYLENOL) suppository 650 mg  650 mg Rectal Q6H PRN    polyethylene glycol (MIRALAX) packet 17 g  17 g Oral DAILY PRN    ondansetron (ZOFRAN ODT) tablet 4 mg  4 mg Oral Q8H PRN    Or    ondansetron (ZOFRAN) injection 4 mg  4 mg IntraVENous Q6H PRN    insulin lispro (HUMALOG) injection   SubCUTAneous AC&HS    glucose chewable tablet 16 g  16 g Oral PRN    glucagon (GLUCAGEN) injection 1 mg  1 mg IntraMUSCular PRN    dextrose 10% infusion 0-250 mL  0-250 mL IntraVENous PRN    naloxone (NARCAN) injection 0.4 mg  0.4 mg IntraVENous EVERY 2 MINUTES AS NEEDED    oxyCODONE-acetaminophen (PERCOCET) 5-325 mg per tablet 1 Tablet  1 Tablet Oral Q4H PRN            Lab/Data Review:  Labs: Results:       Chemistry Recent Labs     09/19/22  0515 09/18/22  0534 09/17/22  0548   * 123* 139*    144 136   K 4.6 4.5 5.7*    111 108   CO2 26 29 23   BUN 36* 38* 78*   CREA 1.67* 1.57* 1.78*   BUCR 22* 24* 44*   AGAP 6 4 5   CA 8.8 8.8 9.3     No results for input(s): TBIL, ALT, ALKP, TP, ALB, GLOB, AGRAT in the last 72 hours.     No lab exists for component: SGOT   CBC w/Diff Recent Labs     09/17/22  0548   WBC 11.2   RBC 3.03*   HGB 8.8*   HCT 28.9*   MCV 95.4   MCH 29.0   MCHC 30.4*   RDW 17.1*   PLT 304   GRANS 77*   LYMPH 11*   EOS 0      Coagulation No results for input(s): PTP, INR, APTT, INREXT, INREXT in the last 72 hours. Iron/Ferritin Lab Results   Component Value Date/Time    Iron 44 (L) 03/09/2022 02:27 AM    TIBC 233 (L) 03/09/2022 02:27 AM    Iron % saturation 19 (L) 03/09/2022 02:27 AM    Ferritin 80 08/09/2022 12:09 PM       BNP    Cardiac Enzymes Lab Results   Component Value Date/Time    CK 71 12/16/2021 09:55 PM    CK - MB 2.0 12/16/2021 09:55 PM    CK-MB Index 2.8 12/16/2021 09:55 PM    Troponin-I, QT <0.02 03/19/2021 04:00 AM        Lactic Acid    Thyroid Studies          All Micro Results       Procedure Component Value Units Date/Time    CULTURE, BLOOD [[id number]] Collected: 09/09/22 0830    Order Status: Completed Specimen: Blood Updated: 09/15/22 0619     Special Requests: NO SPECIAL REQUESTS        Culture result: NO GROWTH 6 DAYS       CULTURE, BLOOD [[id number]] Collected: 09/09/22 0840    Order Status: Completed Specimen: Blood Updated: 09/15/22 0619     Special Requests: NO SPECIAL REQUESTS        Culture result: NO GROWTH 6 DAYS                 Images:    CT (Most Recent). XRAY (Most Recent) XR Results (most recent):  Results from Hospital Encounter encounter on 09/09/22    XR CHEST PORT    Narrative  EXAM: XR CHEST PORT    CLINICAL INDICATION/HISTORY: 81 years Female. S/P Dialysis catheter insertion. Additional History: None    TECHNIQUE: Frontal view of the chest    COMPARISON: 9/12/2022    FINDINGS:    Underpenetration of the lower chest secondary to body habitus. Interval placement of a right IJ approach dual-lumen vascular catheter. The tip  is approximately 5.5 cm distal to the cavoatrial junction. The cardiac silhouette is mild to moderately enlarged increase in size from  prior. Moderate pulmonary vascular congestion and diffuse bilateral interstitial  opacities, increased from prior. Aortic calcifications noted.     No definite evidence of pleural effusion or pneumothorax. No acute osseous abnormality appreciated. Impression  Interval dialysis catheter placement, with tip approximately 5.5 cm distal to  the cavoatrial junction; consider retracting to this level. No pneumothorax  detected. Interval enlargement cardiac silhouette enlargement. Interval increased moderate  pulmonary vascular congestion with diffuse bilateral interstitial edema versus  infiltrates. EKG Results for orders placed or performed in visit on 08/23/18   AMB POC EKG ROUTINE W/ 12 LEADS, INTER & REP     Status: None    Impression    See progress note.         2D ECHO

## 2022-09-19 NOTE — PROGRESS NOTES
PT orders received and chart reviewed. Pt off floor for dialysis. Will follow up. 2nd attempt at 1600, pt declining to participate this session due to fatigue, requesting PT return tomorrow. Will continue to follow.        Thank you for this referral  Saundra Wright PT DPT

## 2022-09-19 NOTE — ROUTINE PROCESS
Bedside and Verbal shift change report given to FLORENCIA Pedro (oncoming nurse) by Mark Marr RN (offgoing nurse). Report included the following information SBAR, Procedure Summary, Intake/Output, MAR, and Recent Results.

## 2022-09-19 NOTE — DIABETES MGMT
Diabetes/ Glycemic Control Plan of Care    Off the floor for dialysis   Continues with corrective and basal insulin   mg/dl this morning  Will continue to monitor    Blood glucose values: Within target range (70-180mg/dL):  no    Current insulin orders:   Lantus 15 units daily  Corrective humalog, very insulin resistant, 4 times daily  Total Daily Dose previous 24 hours = 23 units  15 units lantus   8 units humalog    Current A1c:   Lab Results   Component Value Date/Time    Hemoglobin A1c 7.2 (H) 06/29/2022 03:12 AM      equivalent  to ave Blood Glucose of 160 mg/dl for 2-3 months prior to admission  Adequate glycemic control PTA: no    Nutrition/Diet:   ADULT DIET Regular; 4 carb choices (75 gm/meal); Low Sodium (2 gm); Low Potassium (Less than 3000 mg/day)      Home diabetes medications:    insulin detemir (LEVEMIR) 100 unit/mL injection 15 units daily for diabetes     Plan/Goals:   Blood glucose will be within target of 70 - 180 mg/dl within 72 hours  Reinforce dietary and medication compliance at home.          Education:  [] Refer to Diabetes Education Record                       [x] Education not indicated at this time     Tali Sutton MPH RN [facility] [phone]

## 2022-09-19 NOTE — PROGRESS NOTES
[facility]  -  Progress Note      Patient: Virgilio Perez MRN: [id number]  SSN: [id number]    YOB: 1950  Age: 81 y.o. Sex: female      Admit Date: 9/9/2022    Assessment:     Hospital Problems  Date Reviewed: 4/26/2021            Codes Class Noted POA    CHF (congestive heart failure) ([facility] [address] ICD-10-CM: I50.9  ICD-9-CM: 428.0  9/9/2022 Unknown        Acute respiratory failure with hypoxia [facility]) ICD-10-CM: J96.01  ICD-9-CM: 518.81  10/18/2019 Unknown         -Acute hypoxic respiratory failure in setting of below, s/p bipap. Improved. -Flash pulmonary edema in setting of severely elevated blood pressures.   -Chest pain, atypical with pleuritic component. MI r/o with serial negative cardiac enzymes. No acute ischemic changes on ECG. -Hypertensive crisis, Max /121 mmHg. Improved. -a/c HFpEF. Last Echo in June 2022 with normal LVEF. Renal duplex with Elevated velocities noted within the left renal artery origin. -CAD, s/p prox LAD PCI/YADIRA (2018); cardiac cath 10/2019 with findings as follows. LAD: Previously placed ostial to proximal LAD stent is widely patent. D1 with ostial 50% stenosis. -CKD  -Anemia  -HTN  -DM  -Hypercholesterolemia, on statin  -COPD        Primary cardiologist is Dr. Correa Fails:     Patient has been started on hemodialysis. Would defer fluid management and blood pressure control to nephrology. No new cardiac recommendations at this time. Increase activity as tolerated. We will be available as needed. Thank you. Subjective:     No new complaints.      Objective:      Patient Vitals for the past 8 hrs:   Temp Pulse Resp BP   09/19/22 1527 -- 95 -- (!) 140/70   09/19/22 1320 97.2 °F (36.2 °C) 82 18 (!) 172/77   09/19/22 1315 -- 81 -- (!) 172/82   09/19/22 1300 -- 68 -- (!) 153/79   09/19/22 1245 -- 63 -- 131/60   09/19/22 1230 -- 65 -- (!) 148/82   09/19/22 1215 -- 64 -- (!) 166/79   09/19/22 1200 -- 65 -- (!) 155/77   09/19/22 1145 -- 64 -- (!) 158/76   09/19/22 1130 -- 64 -- (!) 151/70   09/19/22 1115 -- 64 -- (!) 148/80   09/19/22 1100 -- 62 -- (!) 146/63   09/19/22 1045 -- 63 -- (!) 156/72   09/19/22 1030 -- 65 -- (!) 153/68   09/19/22 1015 -- 66 -- 136/66   09/19/22 1000 -- 65 -- 137/63   09/19/22 0945 -- 67 -- 132/65   09/19/22 0930 -- 77 -- (!) 148/60   09/19/22 0926 98 °F (36.7 °C) 80 18 127/61         Patient Vitals for the past 96 hrs:   Weight   09/19/22 0431 112.3 kg (247 lb 8 oz)   09/17/22 0354 103.5 kg (228 lb 1.6 oz)   09/16/22 0421 106.6 kg (235 lb)         Intake/Output Summary (Last 24 hours) at 9/19/2022 1627  Last data filed at 9/19/2022 1037  Gross per 24 hour   Intake 480 ml   Output --   Net 480 ml       Physical Exam:  General:  alert, cooperative, no distress, appears stated age  Neck:  no JVD  Lungs:  clear to auscultation bilaterally  Heart:  regular rate and rhythm  Abdomen:  no guarding or rigidity  Extremities:  no edema    Data Review:     Labs: Results:       Chemistry Recent Labs     09/19/22  0515 09/18/22  0534 09/17/22  0548   * 123* 139*    144 136   K 4.6 4.5 5.7*    111 108   CO2 26 29 23   BUN 36* 38* 78*   CREA 1.67* 1.57* 1.78*   CA 8.8 8.8 9.3   AGAP 6 4 5   BUCR 22* 24* 44*      CBC w/Diff Recent Labs     09/17/22  0548   WBC 11.2   RBC 3.03*   HGB 8.8*   HCT 28.9*      GRANS 77*   LYMPH 11*   EOS 0      Cardiac Enzymes No results found for: CPK, CK, CKMMB, CKMB, RCK3, CKMBT, CKNDX, CKND1, GODFREY, TROPT, TROIQ, SANG, TROPT, TNIPOC, BNP, BNPP   Coagulation No results for input(s): PTP, INR, APTT, INREXT in the last 72 hours.     Lipid Panel Lab Results   Component Value Date/Time    Cholesterol, total 152 10/10/2019 12:00 AM    HDL Cholesterol 46 10/10/2019 12:00 AM    LDL, calculated 86 10/10/2019 12:00 AM    VLDL, calculated 20 10/10/2019 12:00 AM    Triglyceride 101 10/10/2019 12:00 AM    CHOL/HDL Ratio 5.6 (H) 05/21/2018 06:23 AM      BNP No results found for: BNP, BNPP, XBNPT Liver Enzymes No results for input(s): TP, ALB, TBIL, AP in the last 72 hours.     No lab exists for component: SGOT, GPT, DBIL   Digoxin    Thyroid Studies Lab Results   Component Value Date/Time    TSH 0.21 (L) 03/19/2021 04:00 AM

## 2022-09-19 NOTE — PROGRESS NOTES
Pt arrived to suite stable, c/o back pain 10/10 but refused tylenol at this time, in no acute distress.   Hemodialysis today for 3 hours   Net removal of 2000, tolerated well  PCT accessed right side TDC w/o difficulty      Post HD  Pt stable, no complaints at this time  Dressings to AVF dry and intact  Report given to Primary RN, EMILI         GENERAL ASSESSMENT:    LUNGS:  Resp Rate 18   [x] Clear  [] Coarse  [] Crackles  [] Wheezing  [] Diminished                                                           [] RLL   [] LLL  [] RUL   [] ISAURO            Respirations:  [x]Easy  []Labored  []N/A  Cough:  []Productive  []Dry  []N/A               Therapy:  [x]RA   [] Ventilated   [] Intubated   [] Trach            O2 Device:  [] NC   [] NRB  [] Trach Mask  [] BiPaP  Flow:   l/min                                                    CARDIAC: [x] Regular      [] Irregular   [] Rhythm:          [] Monitored   [] Bedside   [] Remotely monitored       EDEMA: [x] None   []Generalized  [] Pitting [] 1+   [] 2 +   [] 3+    [] 4+        SKIN:   [] Hot     [] Cold    [x] Warm   [] Dry    [] Diaphoretic                 [] Flushed  [] Jaundiced  [] Cyanotic  [] Pale      LOC:    [x] Alert      [x]Oriented:    [x] Person     [x] Place   [x]Time               [] Confused  [] Lethargic  [] Medicated  [] Non-responsive  [] Non-Verbal     GI / ABDOMEN:                     [] Flat    [] Distended    [] Soft    [] Firm   [x]  Obese                   [] Diarrhea   [] FMS [] Bowel Sounds  [] Nausea  [] Vomiting                   [] NGT  [] OGT  [] PEG  [] Tube Feedings @     mL/hr     / URINE ASSESSMENT:                   [] Voiding    [x] Oliguria  [] Anuria                     []  Frye   [] Incontinent  []  Incontinent Brief   []  PureWick     PAIN:  [x] 0 []1  []2   []3   []4   []5   []6   []7   []8   []9   []10                MOBILITY:  [x] Bed    [] Stretcher      All Vitals and Treatment Details on Attached Flowsheet

## 2022-09-19 NOTE — ROUTINE PROCESS
SBAR report given to April G on coming nurse from off going nurse Celeste Copeland RN. Shift Assessment was completed. Patient is resting in her bed. Nurse assisted with setting up patient for breakfast.     0945: Patient off unit to dialysis. No meds given per instructions from dialysis. 1420: Pateint is in room. Patient was assisted to [facility] Nurse assess and set-up patient for lunch. No post dialysis changes noted. 1511: Medicated for back pain with Percocet x1 tablet. 1735: Patient calls out c/o having difficulty with certain words. Patient states that she has become forgetful. Spouse at bedside concurs that his wife is not talking like she normally does. He is also demanding answer and to talk to attending. Nurse pages attending and phone call is transferred into the room. Patient was reminded that she was perfectly fine after returning from dialysis today. Now she is tearful and c/o having trouble with some of her words. Nurse reminds patient that she was given Percocet and Neurontin earlier and that the doctor feels that the medication may be making her feel different. A CT SCAN has been ordered. 1930: Off unit to CT. SBAR report given to on coming nurse Ce.

## 2022-09-19 NOTE — PROGRESS NOTES
Progress Note  Pulmonary, Critical Care, and Sleep Medicine    Name: Yousif Brown MRN: [id number]   : 1950 Hospital: [address] [place] [address]    Date: 2022        IMPRESSION:   Abnormal CT Chest Imagin2022- diffuse asymmetric GGOs and atelectasis- most likely from pulmonary edema- will need follow up imaging    Wheezing in a former smoker likely bronchospasm/undiagnosed COPD vs \"cardiac asthma\" in the setting of chronic systolic/diastolic heart failure, favor the latter Echo from  with EF 45% and mild to moderate mitral regurgitation  Acute hypoxic respiratory failure related to flash pulmonary edema. On O2 2 LPM  Hypertensive crisis, resolved  TESSA  Was followed by Dr. Cyndi Vargas- Previously on unknown PAP therapy. Device was recalled about 2 years ago an the patient was lost to follow up. DM 2  Acute on chronic kidney disease with increasing BUN/Creatinine  Hyperkalemia- improved,   Ground glass opacities on CT from August probably due to COVID vs heart failure  History of COPD although PFT in  consistent with restrictive lung disease  History of STEMI in 2018 requiring intubation and mechanical ventilation  Former smoker 46 PY, quit in 2019  Chronic pain syndrome  Obesity Body Mass Index Body mass index is 42.48 kg/m². Recent COVID positive       PLAN:   HD per nephrology   Once it is thought that the patient has obtained an neutral fluid balance, then need to obtain a non-contrast CT chest  Continue BIPAP HS and PRN gregor   Maintain aspiration precautions  SpO2 goal: 90-94%- Avoid over oxygenation    Unable to R/O COPD at this time.   Continue with Brovana/Pulmicort while in-patient  May switch back to [facility] on discharge and PRN albuterol  Will add scheduled Atrovent today and monitor for response  Continue PRN bronchodilators    Prophylaxis issues and glycemic control per primary team  PT/OT/OOBTC as clinical status allows    Assess SpO2 at rest and with ambulation prior to discharge to assess for possible need of OP supplemental oxygen. IF patient want to continue outpatient PAP therapy, then the patient will need to follow up with OP Sleep provider, Dr. Shelby Tong. Outpatient Pulmonary follow up:PFT's and 6 minute walk to further characterize deficits. Suspect more of restrictive disorder        Subjective/Interval History:   I have reviewed the flowsheet and previous days notes. Reviewed interval history. Discussed management with nursing staff. In regards to OP- PAP therapy. The patient was seeing Dr. Shelby Tong. She was using a Jaime - PAP device: unknown settings and mode. The patient's device was then recalled and the patient stopped using her device. She reports that she did not know she had to register her device. She does not know who her DME was. Prior to recall, the patient reports using her device without difficulty. No Intolerance issues      Interval Evaluation  09/19/22  LOS: 10        Right HD-IJC placed 9/16/22  HD first treatment 9/17/22    Patient evaluated at bedside this afternoon. Just completed 2nd HD treatment  No significant issues overnight  Patient did use BIPAP last night  Afebrile  Still wheezing on my exam- no distress. She states that she still feels the same chest and back discomfort that brought her into the ED. The patient feels that nebulizer treatments are beneficial and help her breath easier. Patient ate meal when she returned from HD.   No issues      No sputum , No hemoptysis, No cough  + urine void after HD today, No BM thus far today        Patient Vitals for the past 24 hrs:   Temp Pulse Resp BP SpO2   09/19/22 0823 97.8 °F (36.6 °C) 83 17 (!) 114/49 97 %   09/19/22 0432 -- -- -- -- 97 %   09/19/22 0400 98.1 °F (36.7 °C) 64 13 133/64 99 %   09/19/22 0106 -- -- -- -- 98 %   09/19/22 0031 -- -- -- -- 99 %   09/18/22 2352 98.4 °F (36.9 °C) 67 15 (!) 131/58 95 %   09/18/22 2013 99 °F (37.2 °C) 69 17 102/63 97 %   09/18/22 1947 -- -- -- -- 100 %   09/18/22 1629 98.3 °F (36.8 °C) 75 17 (!) 127/52 93 %   22 1050 98.2 °F (36.8 °C) 67 19 115/81 94 %       Inpat Anti-Infectives (From admission, onward)      None              ROS:Pertinent items are noted in HPI. Orders reviewed including medications. Changes made if indicated. Telemetry monitor reviewed at the bedside. Objective:   Vital Signs:    Visit Vitals  BP (!) 114/49   Pulse 83   Temp 97.8 °F (36.6 °C)   Resp 17   Ht 5' 4\" (1.626 m)   Wt 112.3 kg (247 lb 8 oz)   SpO2 97%   BMI 42.48 kg/m²       O2 Device: None (Room air)   O2 Flow Rate (L/min): 2 l/min   Temp (24hrs), Av.3 °F (36.8 °C), Min:97.8 °F (36.6 °C), Max:99 °F (37.2 °C)       Intake/Output:   Last shift:      No intake/output data recorded. Last 3 shifts:  1901 -  0700  In: 840 [P.O.:840]  Out: 1120 [Urine:1120]    Intake/Output Summary (Last 24 hours) at 2022 0911  Last data filed at 2022 1736  Gross per 24 hour   Intake 480 ml   Output --   Net 480 ml          Physical Exam:    General:  Alert, cooperative, in no distress, appears stated age. Obese    Head:  Normocephalic, without obvious abnormality, atraumatic. Eyes:  ANicteric, PERRL,   Nose: Nares normal. Mucosa normal. No drainage or sinus tenderness. Throat: Lips, mucosa, and tongue normal.  No exudate   Neck: Supple, symmetrical, trachea midline, no adenopathy, thyroid: no enlargment/tenderness/nodules, no carotid bruit and no JVD ,    Back:   Symmetric    Lungs:   Bilateral auscultation distant breath sounds, no rales, + diffuse wheezes heard bilaterally    Chest wall:  No tenderness or deformity. NO intercostal retractions   Heart:  Regular rate and rhythm, S1, S2 normal, no murmur, click, rub or gallop. Abdomen:   Soft, non-tender. Bowel sounds normal. No masses,  No organomegaly. NO paradoxical motion   Extremities: normal, atraumatic, no cyanosis, trace pedal edema   Pulses: 2+ and symmetric all extremities.    Skin: Skin color, texture, turgor normal. No rashes or lesions. NO clubbing   Lymph nodes: Cervical, supraclavicular nodes normal.   Neurologic: Grossly nonfocal      :        Devices: Right IJ-HD Cath            Drips:    DATA:  Labs:  Recent Labs     09/17/22  0548   WBC 11.2   HGB 8.8*   HCT 28.9*          Recent Labs     09/19/22  0515 09/18/22  0534 09/17/22  0548    144 136   K 4.6 4.5 5.7*    111 108   CO2 26 29 23   * 123* 139*   BUN 36* 38* 78*   CREA 1.67* 1.57* 1.78*   CA 8.8 8.8 9.3       Lab Results   Component Value Date/Time    TSH 0.21 (L) 03/19/2021 04:00 AM    Triiodothyronine (T3), free 3.2 02/09/2016 08:47 AM    T4, Free 1.2 10/20/2019 04:23 AM     Lab Results   Component Value Date/Time    NT pro-BNP 2,013 (H) 09/09/2022 07:34 AM    NT pro-BNP 1,871 (H) 08/08/2022 01:48 AM    NT pro-BNP 3,952 (H) 07/08/2022 09:53 AM    NT pro-BNP 1,020 (H) 03/18/2021 01:29 AM    NT pro-BNP 4,929 (H) 10/22/2019 03:30 AM         No results for input(s): PH, PCO2, PO2, HCO3, FIO2 in the last 72 hours. MICRO:  Results       Procedure Component Value Units Date/Time    CULTURE, BLOOD [[id number]] Collected: 09/09/22 0840    Order Status: Completed Specimen: Blood Updated: 09/15/22 0619     Special Requests: NO SPECIAL REQUESTS        Culture result: NO GROWTH 6 DAYS       CULTURE, BLOOD [[id number]] Collected: 09/09/22 0830    Order Status: Completed Specimen: Blood Updated: 09/15/22 0619     Special Requests: NO SPECIAL REQUESTS        Culture result: NO GROWTH 6 DAYS               Imaging:              XR Results (most recent):  Results from Hospital Encounter encounter on 09/09/22    XR CHEST PORT    Narrative  EXAM: XR CHEST PORT    CLINICAL INDICATION/HISTORY: 81 years Female. S/P Dialysis catheter insertion. Additional History: None    TECHNIQUE: Frontal view of the chest    COMPARISON: 9/12/2022    FINDINGS:    Underpenetration of the lower chest secondary to body habitus.     Interval placement of a right IJ approach dual-lumen vascular catheter. The tip  is approximately 5.5 cm distal to the cavoatrial junction. The cardiac silhouette is mild to moderately enlarged increase in size from  prior. Moderate pulmonary vascular congestion and diffuse bilateral interstitial  opacities, increased from prior. Aortic calcifications noted. No definite evidence of pleural effusion or pneumothorax. No acute osseous abnormality appreciated. Impression  Interval dialysis catheter placement, with tip approximately 5.5 cm distal to  the cavoatrial junction; consider retracting to this level. No pneumothorax  detected. Interval enlargement cardiac silhouette enlargement. Interval increased moderate  pulmonary vascular congestion with diffuse bilateral interstitial edema versus  infiltrates. CT Chest 8/8/22  IMPRESSION  1. No CT evidence for central pulmonary embolism.  -Similar enlargement of central pulmonary arteries suggest pulmonary artery  hypertension. 2. Multifocal geographic and peribronchial groundglass airspace infiltrates more  significant in the right lung than left. Imaging appearance is not specifically  characteristic of Covid pneumonia and could reflect viral inflammation or less  likely developing asymmetric pulmonary edema. 3. Multisegmental dependent atelectasis/consolidation right lower lobe adjacent  to a small effusion.  -Similar but less extensive subsegmental atelectasis adjacent to trace left  effusion. Complex decision making was made in the evaluation and management plans during this consultation. More than 50% of time was spent in counseling and coordination of care including review of data and discussion with other team members.       Macario Lopez DO, [facility]    [facility]  Pulmonary, Critical Care, and Sleep Medicine

## 2022-09-19 NOTE — DIALYSIS
ACUTE HEMODIALYSIS FLOW SHEET    HEMODIALYSIS ORDERS: Physician: Dr. Екатерина Montgomery: [facility]   Duration: 3.5 hr   BFR: 350   DFR: 600   Dialysate:  Temp 36-37*C   K+  2    Ca+ 2.5   Na 138   Bicarb 35   Wt Readings from Last 1 Encounters:   09/19/22 112.3 kg (247 lb 8 oz)    Patient Chart [x]   Unable to Obtain []  Dry weight/UF Goal: 2000 ml    Heparin []  Bolus    Units    [] Hourly    Units    [x]None       Pre BP: 127/61  Pulse: 80  Respirations: 18 Temp: 98.0 temporal  [] Oral  [] Ax  [] Esoph   Labs: []  Pre  []  Post:   [x] N/A   Additional Orders (medications, blood products, hypotension management): [] Yes   [x] No     [x]  [facility] Consent Verified     CATHETER ACCESS:    [x]Right   []Left   [x]IJ   []Fem  []Chest wall  []TransHepatic   [] First use X-ray verified     []Tunnel    [x] Non Tunneled   [x]No S/S infection  []Redness  []Drainage []Cultured []Swelling []Pain   [x]Medical Aseptic Prep Utilized   []Dressing Changed  [] Biopatch  Date:    []Clotted   [x]Patent   Flows: [x]Good  []Poor  []Reversed   If access problem,  notified: []Yes    [x]N/A        GRAFT/FISTULA ACCESS:   [x]N/A                       Hospital: [facility]          Room # 2314/01    [x] Routine         [] 1st Time Acute/Chronic   [] Urgent      [] Stat            [x] Acute Room   []  Bedside    [] ICU/CCU     [] ER     Isolation Precautions:  [x] Dialysis    There are currently no Active Isolations     ALLERGIES:     No Known Allergies     Code Status:  Full Code     Hepatitis Status      Lab Results   Component Value Date/Time    Hepatitis A, IgM NEGATIVE  03/23/2015 09:04 AM    Hepatitis B surface Ag 0.31 09/16/2022 11:52 AM    Hepatitis B surface Ab <3.10 (L) 09/16/2022 11:52 AM    Hepatitis B core, IgM NEGATIVE  03/23/2015 09:04 AM    Hepatitis C virus Ab 0.09 03/23/2015 09:04 AM        Current Labs:      Lab Results   Component Value Date/Time    WBC 11.2 09/17/2022 05:48 AM    HGB 8.8 (L) 09/17/2022 05:48 AM HCT 28.9 (L) 09/17/2022 05:48 AM    PLATELET 029 79/12/7966 05:48 AM    MCV 95.4 09/17/2022 05:48 AM     Lab Results   Component Value Date/Time    Sodium 142 09/19/2022 05:15 AM    Potassium 4.6 09/19/2022 05:15 AM    Chloride 110 09/19/2022 05:15 AM    CO2 26 09/19/2022 05:15 AM    Anion gap 6 09/19/2022 05:15 AM    Glucose 213 (H) 09/19/2022 05:15 AM    BUN 36 (H) 09/19/2022 05:15 AM    Creatinine 1.67 (H) 09/19/2022 05:15 AM    BUN/Creatinine ratio 22 (H) 09/19/2022 05:15 AM    GFR est AA 37 (L) 09/19/2022 05:15 AM    GFR est non-AA 30 (L) 09/19/2022 05:15 AM    Calcium 8.8 09/19/2022 05:15 AM          DIET:  DIET ADULT     PRIMARY NURSE REPORT:   Pre Dialysis: April FLORENCIA Ramsey    Time: 0266      EDUCATION:    [x] Patient           Knowledge Basis: []None []Minimal [x] Substantial [] Unknown  Barriers to learning  [x]None  [] Intubated/Trached/Ventilated  [] Sedated/Paralyzed   [x] Access Care     [] S&S of infection  [] Fluid Management  [] K+   [x] Procedural    [] Medications   [] Tx Options   [] Transplant   [] Diet      Teaching Tools:  [x] Explain  [] Demo  [] Handouts [] Video  Patient response: [x] Verbalized understanding   [] Requires follow up        [x] Time Out/Safety Check    [x] Extracorporeal Circuit Tested for integrity       RO/HEMODIALYSIS MACHINE SAFETY CHECKS - Before each treatment:        [address] [place] [address]                                     [] Unit Machine # 0 with centralized RO                                  [] Portable Machine #1/RO serial # [id number]                                  [x] Portable Machine #2/RO serial # [id number]                                  [] Portable Machine #4/RO serial # [id number]                                  [] Portable Machine #10/RO serial # [id number]                                                                                                       Alarm Test:  Pass time 0813            [x] RO/Machine Log Complete    Machine Temp    36-37*C Dialysate: pH  7.4    Conductivity: Meter 14.0    RO Machine  2     TCD: 13.8  Dialyzer Lot # [id number]     Blood Tubing Lot # [id number]     Saline Lot # [id number]     CHLORINE TESTING-Before each treatment and every 4 hours    Total Chlorine: [x] less than 0.1 ppm  Initial Time Check: 0856       4 Hr/2nd Check Time: 8421   (if greater than 0.1 ppm from Primary then every 30 minutes from Secondary)     TREATMENT INITIATION - with Dialysis Precautions:   [x] All Connections Secured              [x] Saline Line Double Clamped   [x] Venous Parameters Set               [x] Arterial Parameters Set    [x] Prime Given 250ml NSS              [x]Air Foam Detector Engaged        Treatment Initiation Note:  SEE HD RN NOTES    During Treatment Notes:  0384  Face & Vascular access visible with art and adan line connections intact. Pt tolerating dialysis. 0930  Face & Vascular access visible with art and adan line connections intact. Pt tolerating dialysis. 0945  Face & Vascular access visible with art and adan line connections intact. Pt tolerating dialysis. 1000  Face & Vascular access visible with art and adan line connections intact. Pt tolerating dialysis. 1015  Face & Vascular access visible with art and adan line connections intact. Pt tolerating dialysis. 1030  Face & Vascular access visible with art and adan line connections intact. Pt tolerating dialysis. 1045  Face & Vascular access visible with art and adan line connections intact. Pt tolerating dialysis. 1100  Face & Vascular access visible with art and adan line connections intact. Pt tolerating dialysis. 1115  Face & Vascular access visible with art and adan line connections intact. Pt tolerating dialysis. 1130  Face & Vascular access visible with art and adan line connections intact. Pt tolerating dialysis. 1145  Face & Vascular access visible with art and adan line connections intact. Pt tolerating dialysis.   1200  Face & Vascular access visible with art and adan line connections intact. Pt tolerating dialysis. 1215  Face & Vascular access visible with art and adan line connections intact. Pt tolerating dialysis. 1230  Face & Vascular access visible with art and adan line connections intact. Pt tolerating dialysis. 1245  Face & Vascular access visible with art and adan line connections intact. Pt tolerating dialysis. 1300  Face & Vascular access visible with art and adan line connections intact. Pt tolerating dialysis. 1320  Dialysis treatment complete.            Post Assessment  Dialyzer Cleared:   [x] Good  [] Fair  [] Poor  Blood processed:  70.0 L  UF Removed:  2000 Ml    Post BP: 172/77  Pulse: 82  Respirations: 18   Temp: 97.2 temporal  [] Oral  [] Ax  [] Esophageal   Lungs: [] Clear                [x] No change from initial assessment   Post Tx Vascular Access: [x] N/A       Cardiac:  [x] Regular   [] Irregular   Rhythm:  [] Monitored   [x] Not Monitored    CVC Catheter:   Locking solution: Heparin 1:1000 U  Arterial port 1.1 ml   Venous port 1.1 ml   Edema:  [x] None  [] Generalized                     Skin:[] Warm  [x] Dry [] Diaphoretic               [] Flushed  [] Pale [] Cyanotic Pain:  []0  []1-2  []3-4  []5-6   []7-8  [x]9-10         Post Treatment Note:   SEE HD RN NOTES     POST TREATMENT PRIMARY NURSE HANDOFF REPORT:   Post Dialysis: April Roxy RN        Time:  8853       Abbreviations: AVG-arterial venous graft, AVF-arterial venous fistula, IJ-Internal Jugular, Subcl-Subclavian, Fem-Femoral, Tx-treatment, AP/HR-apical heart rate, VSS- Vital Signs Stable, CVC- Central Venous Catheter, DFR-dialysate flow rate, BFR-blood flow rate, AP-arterial pressure, -venous pressure, UF-ultrafiltrate, TMP-transmembrane pressure, Adan-Venous, Art-Arterial, RO-Reverse Osmosis

## 2022-09-20 ENCOUNTER — APPOINTMENT (OUTPATIENT)
Dept: CT IMAGING | Age: 72
DRG: 291 | End: 2022-09-20
Attending: INTERNAL MEDICINE
Payer: MEDICARE

## 2022-09-20 LAB
ANION GAP SERPL CALC-SCNC: 7 MMOL/L (ref 3–18)
ARTERIAL PATENCY WRIST A: POSITIVE
BASE EXCESS BLD CALC-SCNC: 2.4 MMOL/L
BDY SITE: ABNORMAL
BUN SERPL-MCNC: 22 MG/DL (ref 7–18)
BUN/CREAT SERPL: 14 (ref 12–20)
CALCIUM SERPL-MCNC: 8.6 MG/DL (ref 8.5–10.1)
CHLORIDE SERPL-SCNC: 105 MMOL/L (ref 100–111)
CO2 SERPL-SCNC: 27 MMOL/L (ref 21–32)
CREAT SERPL-MCNC: 1.58 MG/DL (ref 0.6–1.3)
GAS FLOW.O2 O2 DELIVERY SYS: ABNORMAL L/MIN
GLUCOSE BLD STRIP.AUTO-MCNC: 128 MG/DL (ref 70–110)
GLUCOSE BLD STRIP.AUTO-MCNC: 145 MG/DL (ref 70–110)
GLUCOSE BLD STRIP.AUTO-MCNC: 168 MG/DL (ref 70–110)
GLUCOSE BLD STRIP.AUTO-MCNC: 212 MG/DL (ref 70–110)
GLUCOSE BLD STRIP.AUTO-MCNC: 227 MG/DL (ref 70–110)
GLUCOSE SERPL-MCNC: 132 MG/DL (ref 74–99)
HCO3 BLD-SCNC: 26.3 MMOL/L (ref 22–26)
PCO2 BLD: 36.6 MMHG (ref 35–45)
PH BLD: 7.47 [PH] (ref 7.35–7.45)
PO2 BLD: 63 MMHG (ref 80–100)
POTASSIUM SERPL-SCNC: 4.1 MMOL/L (ref 3.5–5.5)
SAO2 % BLD: 93 % (ref 92–97)
SERVICE CMNT-IMP: ABNORMAL
SODIUM SERPL-SCNC: 139 MMOL/L (ref 136–145)
SPECIMEN TYPE: ABNORMAL

## 2022-09-20 PROCEDURE — 36600 WITHDRAWAL OF ARTERIAL BLOOD: CPT

## 2022-09-20 PROCEDURE — 80048 BASIC METABOLIC PNL TOTAL CA: CPT

## 2022-09-20 PROCEDURE — 74011250637 HC RX REV CODE- 250/637: Performed by: PHYSICIAN ASSISTANT

## 2022-09-20 PROCEDURE — 94762 N-INVAS EAR/PLS OXIMTRY CONT: CPT

## 2022-09-20 PROCEDURE — 74011000250 HC RX REV CODE- 250: Performed by: INTERNAL MEDICINE

## 2022-09-20 PROCEDURE — 74011636637 HC RX REV CODE- 636/637: Performed by: HOSPITALIST

## 2022-09-20 PROCEDURE — 99232 SBSQ HOSP IP/OBS MODERATE 35: CPT | Performed by: INTERNAL MEDICINE

## 2022-09-20 PROCEDURE — 2709999900 HC NON-CHARGEABLE SUPPLY

## 2022-09-20 PROCEDURE — 82962 GLUCOSE BLOOD TEST: CPT

## 2022-09-20 PROCEDURE — 94761 N-INVAS EAR/PLS OXIMETRY MLT: CPT

## 2022-09-20 PROCEDURE — 94660 CPAP INITIATION&MGMT: CPT

## 2022-09-20 PROCEDURE — 65270000046 HC RM TELEMETRY

## 2022-09-20 PROCEDURE — 82803 BLOOD GASES ANY COMBINATION: CPT

## 2022-09-20 PROCEDURE — 71250 CT THORAX DX C-: CPT

## 2022-09-20 PROCEDURE — 94640 AIRWAY INHALATION TREATMENT: CPT

## 2022-09-20 PROCEDURE — 74011250637 HC RX REV CODE- 250/637: Performed by: INTERNAL MEDICINE

## 2022-09-20 PROCEDURE — 36415 COLL VENOUS BLD VENIPUNCTURE: CPT

## 2022-09-20 RX ORDER — ACETAMINOPHEN 500 MG
1000 TABLET ORAL EVERY 8 HOURS
Status: DISCONTINUED | OUTPATIENT
Start: 2022-09-20 | End: 2022-09-22 | Stop reason: HOSPADM

## 2022-09-20 RX ORDER — POLYETHYLENE GLYCOL 3350 17 G/17G
17 POWDER, FOR SOLUTION ORAL ONCE
Status: COMPLETED | OUTPATIENT
Start: 2022-09-20 | End: 2022-09-20

## 2022-09-20 RX ADMIN — Medication 2 UNITS: at 17:03

## 2022-09-20 RX ADMIN — SODIUM CHLORIDE, PRESERVATIVE FREE 10 ML: 5 INJECTION INTRAVENOUS at 13:40

## 2022-09-20 RX ADMIN — Medication 6 UNITS: at 22:04

## 2022-09-20 RX ADMIN — DILTIAZEM HYDROCHLORIDE 120 MG: 120 CAPSULE, COATED, EXTENDED RELEASE ORAL at 09:58

## 2022-09-20 RX ADMIN — ACETAMINOPHEN 1000 MG: 500 TABLET ORAL at 22:05

## 2022-09-20 RX ADMIN — POLYETHYLENE GLYCOL 3350 17 G: 17 POWDER, FOR SOLUTION ORAL at 09:58

## 2022-09-20 RX ADMIN — ARFORMOTEROL TARTRATE 15 MCG: 15 SOLUTION RESPIRATORY (INHALATION) at 21:26

## 2022-09-20 RX ADMIN — GABAPENTIN 400 MG: 400 CAPSULE ORAL at 09:58

## 2022-09-20 RX ADMIN — ATORVASTATIN CALCIUM 40 MG: 40 TABLET, FILM COATED ORAL at 22:05

## 2022-09-20 RX ADMIN — IPRATROPIUM BROMIDE 0.5 MG: 0.5 SOLUTION RESPIRATORY (INHALATION) at 13:41

## 2022-09-20 RX ADMIN — CLONIDINE HYDROCHLORIDE 0.1 MG: 0.1 TABLET ORAL at 09:58

## 2022-09-20 RX ADMIN — ACETAMINOPHEN 1000 MG: 500 TABLET ORAL at 16:46

## 2022-09-20 RX ADMIN — ACETAMINOPHEN 1000 MG: 500 TABLET ORAL at 09:58

## 2022-09-20 RX ADMIN — HYDRALAZINE HYDROCHLORIDE 10 MG: 10 TABLET, FILM COATED ORAL at 16:47

## 2022-09-20 RX ADMIN — IPRATROPIUM BROMIDE 0.5 MG: 0.5 SOLUTION RESPIRATORY (INHALATION) at 01:38

## 2022-09-20 RX ADMIN — CLONIDINE HYDROCHLORIDE 0.1 MG: 0.1 TABLET ORAL at 17:41

## 2022-09-20 RX ADMIN — BUDESONIDE 500 MCG: 0.5 SUSPENSION RESPIRATORY (INHALATION) at 21:27

## 2022-09-20 RX ADMIN — IPRATROPIUM BROMIDE 0.5 MG: 0.5 SOLUTION RESPIRATORY (INHALATION) at 21:27

## 2022-09-20 RX ADMIN — SODIUM CHLORIDE, PRESERVATIVE FREE 10 ML: 5 INJECTION INTRAVENOUS at 06:00

## 2022-09-20 RX ADMIN — HYDRALAZINE HYDROCHLORIDE 10 MG: 10 TABLET, FILM COATED ORAL at 09:58

## 2022-09-20 RX ADMIN — HYDRALAZINE HYDROCHLORIDE 10 MG: 10 TABLET, FILM COATED ORAL at 22:05

## 2022-09-20 RX ADMIN — SODIUM CHLORIDE, PRESERVATIVE FREE 10 ML: 5 INJECTION INTRAVENOUS at 22:05

## 2022-09-20 RX ADMIN — ASPIRIN 81 MG CHEWABLE TABLET 81 MG: 81 TABLET CHEWABLE at 09:58

## 2022-09-20 RX ADMIN — Medication 15 UNITS: at 11:12

## 2022-09-20 RX ADMIN — POLYETHYLENE GLYCOL 3350 17 G: 17 POWDER, FOR SOLUTION ORAL at 16:45

## 2022-09-20 NOTE — PROGRESS NOTES
Problem: Heart Failure: Day 5  Goal: Activity/Safety  9/20/2022 0454 by Trudie Buerger, RN  Outcome: Progressing Towards Goal  9/20/2022 0453 by Trudie Buerger, RN  Outcome: Progressing Towards Goal  Goal: Diagnostic Test/Procedures  9/20/2022 0454 by Trudie Buerger, RN  Outcome: Progressing Towards Goal  9/20/2022 0453 by Trudie Buerger, RN  Outcome: Progressing Towards Goal  Goal: Nutrition/Diet  9/20/2022 0454 by Trudie Buerger, RN  Outcome: Progressing Towards Goal  9/20/2022 0453 by Trudie Buerger, RN  Outcome: Progressing Towards Goal  Goal: Discharge Planning  Outcome: Progressing Towards Goal  Goal: Medications  9/20/2022 0454 by Trudie Buerger, RN  Outcome: Progressing Towards Goal  9/20/2022 0453 by Trudie Buerger, RN  Outcome: Progressing Towards Goal  Goal: Respiratory  9/20/2022 0454 by Trudie Buerger, RN  Outcome: Progressing Towards Goal  9/20/2022 0453 by Trudie Buerger, RN  Outcome: Progressing Towards Goal  Goal: Treatments/Interventions/Procedures  9/20/2022 0454 by Trudie Buerger, RN  Outcome: Progressing Towards Goal  9/20/2022 0453 by Trudie Buerger, RN  Outcome: Progressing Towards Goal  Goal: Psychosocial  9/20/2022 0454 by Trudie Buerger, RN  Outcome: Progressing Towards Goal  9/20/2022 0453 by Trudie Buerger, RN  Outcome: Progressing Towards Goal

## 2022-09-20 NOTE — PROGRESS NOTES
09/20/22 0005   Oxygen Therapy   O2 Sat (%) 98 %   Pulse via Oximetry 70 beats per minute   O2 Device BIPAP   FIO2 (%) 30 %   CPAP/BIPAP   CPAP/BIPAP Start/Stop On   Device Mode S/T;BIPAP   $$ Bipap Daily Yes   Bio[employer] ID # [id number]   Mask Type and Size Full face   PIP Observed 13 cm H20   IPAP (cm H2O) 13 cm H2O   EPAP (cm H2O) 4 cm H2O   Inspiratory Time (sec) 1 seconds   Vt Spont (ml) 430 ml   Ve Observed (l/min) 8.3 l/min   Total RR (Spontaneous) 18 breaths per minute   Insp Rise Time (sec) 3   Leak (Estimated) 45 L/min   Pt's Home Machine No   Biomedical Check Performed Yes   Settings Verified Yes   Alarm Settings   High Pressure 30   Low Pressure 5   Apnea 20   Low Ve 3   High Rate 45   Low Rate 8 A routine pp care  Breastfeeding  PP depression  P lactation consult  Monitor depression

## 2022-09-20 NOTE — PROGRESS NOTES
Attempted to see patient for PT treatment. She declines and agreeable to therapy tomorrow. Thank you.      Mariya Schaffer PT, DPT

## 2022-09-20 NOTE — ROUTINE PROCESS
SBAR report given by off going nurse Valentina Ordoñez, RN to Mayela LISA RN on coming nurse. 1638: Assessment completed. Nurse awakes patient to prepare for CT scan. Radiology called for patient to come down.     0801: off unit to radiology for CT of abdomen. 3669; Return to the unit. 0940: CHG bath performed by students to right side of upper body around IJ cental line used for dialysis. 1000: Medicated for pain with Tylenol 1000mg for lower back pain, 10/10. Nurse informs patient that her Percocet has been d/c for now. [id number]: nurse notes that patient is resting in bed asleep off and on.     1635: Medicated for pain again to lower back rated 7/10.     1745: Visited by spouse. Patient was educated on procedure scheduled for tomorrow for a [facility] for dialysis to be placed. NPO after midnight.     SBAR report given to Alma Rosa, [address]

## 2022-09-20 NOTE — PROGRESS NOTES
0300- Patient got up to go to the bathroom. Removed bipap, BP cuff, pulse ox, tele monitor. Assisted back to bed. Lines placed back on patient. Patient refusing to wear bipap despite much encouragement.

## 2022-09-20 NOTE — PROGRESS NOTES
Patient is progressing well towards recovery. No falls and gait is improving well. Patient will continue to work with PT/OT.

## 2022-09-20 NOTE — ROUTINE PROCESS
Bedside and Verbal shift change report given to Mayela Ramsey (oncoming nurse) by Kay Good (offgoing nurse). Report included the following information SBAR, Kardex, Intake/Output, MAR, and Cardiac Rhythm NSR .

## 2022-09-20 NOTE — PROGRESS NOTES
Progress Note      43-year-old female with past medical history of hypertension, congestive heart failure, admitted for decompensated heart failure, following for renal failure  Subjective      Overnight event noted  Feels off. Some word finding difficulty  Sob on and off persists  UO trailing    IMPRESSION:   Acute on chronic kidney injury, rising creatinine and BUN in setting of overdiuresis  CKD stage III with proteinuria, baseline creatinine around 1.5 mg per DL, history of frequent CARA  Uremia  Decompensated heart failure, diastolic heart failure, improving symptoms  Diabetes  Secondary hyperparathyroidism of CKD   PLAN:   UO trailing. Her SOB is on and off. Had a gary discussion about risks of stopping dialysis (with recurrence of sx). She opted to continue dialysis at this point. Will consult IR for Nicole Gallardo and look for placement in OP unit now.  Next dialysis tomorrow     Facility-Administered Medications: None       Current Facility-Administered Medications   Medication Dose Route Frequency    polyethylene glycol (MIRALAX) packet 17 g  17 g Oral ONCE    acetaminophen (TYLENOL) tablet 1,000 mg  1,000 mg Oral Q8H    ipratropium (ATROVENT) 0.02 % nebulizer solution 0.5 mg  0.5 mg Nebulization Q6H RT    insulin glargine (LANTUS) injection 15 Units  15 Units SubCUTAneous ACL    0.9% sodium chloride infusion 250 mL  250 mL IntraVENous DIALYSIS PRN    hydrALAZINE (APRESOLINE) tablet 10 mg  10 mg Oral TID    dilTIAZem ER (CARDIZEM CD) capsule 120 mg  120 mg Oral DAILY    [Held by provider] furosemide (LASIX) tablet 40 mg  40 mg Oral DAILY    cloNIDine HCL (CATAPRES) tablet 0.1 mg  0.1 mg Oral BID    arformoteroL (BROVANA) neb solution 15 mcg  15 mcg Nebulization BID RT    budesonide (PULMICORT) 500 mcg/2 ml nebulizer suspension  500 mcg Nebulization BID RT    albuterol-ipratropium (DUO-NEB) 2.5 MG-0.5 MG/3 ML  3 mL Nebulization Q4H PRN    aspirin chewable tablet 81 mg  81 mg Oral DAILY    atorvastatin (LIPITOR) tablet 40 mg  40 mg Oral QHS    gabapentin (NEURONTIN) capsule 400 mg  400 mg Oral DAILY    sodium chloride (NS) flush 5-40 mL  5-40 mL IntraVENous Q8H    sodium chloride (NS) flush 5-40 mL  5-40 mL IntraVENous PRN    acetaminophen (TYLENOL) tablet 650 mg  650 mg Oral Q6H PRN    Or    acetaminophen (TYLENOL) suppository 650 mg  650 mg Rectal Q6H PRN    polyethylene glycol (MIRALAX) packet 17 g  17 g Oral DAILY PRN    ondansetron (ZOFRAN ODT) tablet 4 mg  4 mg Oral Q8H PRN    Or    ondansetron (ZOFRAN) injection 4 mg  4 mg IntraVENous Q6H PRN    insulin lispro (HUMALOG) injection   SubCUTAneous AC&HS    glucose chewable tablet 16 g  16 g Oral PRN    glucagon (GLUCAGEN) injection 1 mg  1 mg IntraMUSCular PRN    dextrose 10% infusion 0-250 mL  0-250 mL IntraVENous PRN    naloxone (NARCAN) injection 0.4 mg  0.4 mg IntraVENous EVERY 2 MINUTES AS NEEDED    [Held by provider] oxyCODONE-acetaminophen (PERCOCET) 5-325 mg per tablet 1 Tablet  1 Tablet Oral Q4H PRN       Review of Systems:   As above . Data Review:    Labs: Results:       Chemistry Recent Labs     09/20/22  0519 09/19/22  0515 09/18/22  0534   * 213* 123*    142 144   K 4.1 4.6 4.5    110 111   CO2 27 26 29   BUN 22* 36* 38*   CREA 1.58* 1.67* 1.57*   CA 8.6 8.8 8.8   AGAP 7 6 4   BUCR 14 22* 24*        CBC w/Diff No results for input(s): WBC, RBC, HGB, HCT, PLT, GRANS, LYMPH, EOS, HGBEXT, HCTEXT, PLTEXT, HGBEXT, HCTEXT, PLTEXT in the last 72 hours. Coagulation No results for input(s): PTP, INR, APTT, INREXT, INREXT in the last 72 hours. Iron/Ferritin No results for input(s): IRON in the last 72 hours. No lab exists for component: TIBCCALC   BNP No results for input(s): BNPP in the last 72 hours. Cardiac Enzymes No results for input(s): CPK, CKND1, GODFREY in the last 72 hours. No lab exists for component: CKRMB, TROIP   Liver Enzymes No results for input(s): TP, ALB, TBIL, AP in the last 72 hours.     No lab exists for component: SGOT, GPT, DBIL   Thyroid Studies Lab Results   Component Value Date/Time    TSH 0.21 (L) 03/19/2021 04:00 AM         EKG: normal EKG, normal sinus rhythm.     Physical Assessment:   Visit Vitals  BP (!) 122/49 (BP 1 Location: Right upper arm, BP Patient Position: At rest)   Pulse 76   Temp 98.9 °F (37.2 °C)   Resp 20   Ht 5' 4\" (1.626 m)   Wt 109.2 kg (240 lb 12.8 oz)   SpO2 98%   BMI 41.33 kg/m²     Weight change: -3.039 kg (-6 lb 11.2 oz)    Intake/Output Summary (Last 24 hours) at 9/20/2022 1703  Last data filed at 9/20/2022 0600  Gross per 24 hour   Intake 480 ml   Output 100 ml   Net 380 ml       Physical Exam:   General: comfortable, no acute distress   HEENT sclera anicteric, supple neck, no thyromegaly  CVS: S1S2 heard,  no rub  RS: + air entry b/l,   Abd: Soft, Non tender, Not distended, Positive bowel sounds, no organomegaly, no CVA / supra pubic tenderness  Neuro: non focal, awake, alert , CN II-XII are grossly intact  Extrm: mild edema, no cyanosis, clubbing   Skin: no visible  Rash  Musculoskeletal: No gross joints or bone deformities     Procedures/imaging: see electronic medical records for all procedures, Xrays and details which were not copied into this note but were reviewed prior to creation of Candida Johnson MD  September 20, 2022  [facility]  Office [phone]

## 2022-09-20 NOTE — PROGRESS NOTES
Progress Note  Pulmonary, Critical Care, and Sleep Medicine    Name: Nelida Nichols MRN: [id number]   : 1950 Hospital: [address] [place] [address]    Date: 2022        IMPRESSION:   Abnormal CT Chest Imagin2022- diffuse asymmetric GGOs and atelectasis- most likely from pulmonary edema- will need follow up imaging    Wheezing in a former smoker likely bronchospasm/undiagnosed COPD vs \"cardiac asthma\" in the setting of chronic systolic/diastolic heart failure, favor the latter Echo from  with EF 45% and mild to moderate mitral regurgitation  Acute hypoxic respiratory failure related to flash pulmonary edema. On O2 2 LPM  Hypertensive crisis, resolved  TESSA  Was followed by Dr. Jean Malone- Previously on unknown PAP therapy. Device was recalled about 2 years ago an the patient was lost to follow up. DM 2  Acute on chronic kidney disease with increasing BUN/Creatinine  Hyperkalemia- improved,   Ground glass opacities on CT from August probably due to COVID vs heart failure  History of COPD although PFT in  consistent with restrictive lung disease  History of STEMI in 2018 requiring intubation and mechanical ventilation  Former smoker 46 PY, quit in 2019  Chronic pain syndrome  Obesity Body Mass Index Body mass index is 41.33 kg/m². Recent COVID positive       PLAN:   HD per nephrology      Continue BIPAP HS and PRN gregor   Maintain aspiration precautions  SpO2 goal: 90-94%- Avoid over oxygenation    Unable to R/O COPD at this time. Continue with Brovana/Pulmicort while in-patient  May switch back to [facility] on discharge and PRN albuterol  Will add scheduled Atrovent today and monitor for response  Continue PRN bronchodilators    Prophylaxis issues and glycemic control per primary team  PT/OT/OOBTC as clinical status allows    Assess SpO2 at rest and with ambulation prior to discharge to assess for possible need of OP supplemental oxygen.   IF patient want to continue outpatient PAP therapy, then the patient will need to follow up with OP Sleep provider, Dr. Bj Thacker. Outpatient Pulmonary follow up:PFT's and 6 minute walk to further characterize deficits. Suspect more of restrictive disorder        Subjective/Interval History:   I have reviewed the flowsheet and previous days notes. Reviewed interval history. Discussed management with nursing staff. In regards to OP- PAP therapy. The patient was seeing Dr. Bj Thacker. She was using a Jaime - PAP device: unknown settings and mode. The patient's device was then recalled and the patient stopped using her device. She reports that she did not know she had to register her device. She does not know who her DME was. Prior to recall, the patient reports using her device without difficulty. No Intolerance issues      Interval Evaluation  09/20/22  LOS: 11        Right HD-IJC placed 9/16/22- plan to change to tunneled cath tomorrow and proceed with HD as Outpatient  HD first treatment 9/17/22    Patient was siting up and eating lunch this afternoon - on room air  She reports some left side discomfort but no chest pain  No significant issues overnight  Patient did use BIPAP again last night  Afebrile  Still wheezing on my exam- no distress. Most of the wheezing at this time appears to be in her upper airway, with some transmission to lung fields. Ct Chest - non con obtained- Final report pending but there has been significant improvement/ reduction in GGO's noted in August- suspect from pulmonary edema from cardio-renal syndrome.       No sputum , No hemoptysis, No cough  + urine void after HD today, No BM thus far today        Patient Vitals for the past 24 hrs:   Temp Pulse Resp BP SpO2   09/20/22 1237 99.6 °F (37.6 °C) 91 13 (!) 121/50 95 %   09/20/22 0400 98.9 °F (37.2 °C) 76 20 (!) 122/49 --   09/20/22 0139 -- -- -- -- 98 %   09/20/22 0005 -- -- -- -- 98 %   09/20/22 0000 98.3 °F (36.8 °C) 76 23 (!) 152/66 90 %   09/19/22 2137 -- -- -- -- 94 %   09/19/22 2057 98.6 °F (37 °C) 81 18 137/81 92 %   22 1716 98.8 °F (37.1 °C) 96 18 (!) 142/72 96 %   22 1527 -- 95 -- (!) 140/70 --   22 1320 97.2 °F (36.2 °C) 82 18 (!) 172/77 --   22 1315 -- 81 -- (!) 172/82 --       Inpat Anti-Infectives (From admission, onward)      None              ROS:Pertinent items are noted in HPI. Orders reviewed including medications. Changes made if indicated. Telemetry monitor reviewed at the bedside. Objective:   Vital Signs:    Visit Vitals  BP (!) 121/50   Pulse 91   Temp 99.6 °F (37.6 °C)   Resp 13   Ht 5' 4\" (1.626 m)   Wt 109.2 kg (240 lb 12.8 oz)   SpO2 95%   BMI 41.33 kg/m²       O2 Device: None (Room air) (refusing bipap)   O2 Flow Rate (L/min): 2 l/min   Temp (24hrs), Av.6 °F (37 °C), Min:97.2 °F (36.2 °C), Max:99.6 °F (37.6 °C)       Intake/Output:   Last shift:      No intake/output data recorded. Last 3 shifts:  1901 -  0700  In: 480 [P.O.:480]  Out: 100 [Urine:100]    Intake/Output Summary (Last 24 hours) at 2022 1303  Last data filed at 2022 0600  Gross per 24 hour   Intake 240 ml   Output 100 ml   Net 140 ml          Physical Exam:    General:  Alert, cooperative, in no distress, appears stated age. Obese    Head:  Normocephalic, without obvious abnormality, atraumatic. Eyes:  ANicteric, PERRL,   Nose: Nares normal. Mucosa normal. No drainage or sinus tenderness. Throat: Lips, mucosa, and tongue normal.  No exudate   Neck: Supple, symmetrical, trachea midline, no adenopathy, thyroid: no enlargment/tenderness/nodules, no carotid bruit and no JVD , + wheezing in upper airway   Back:   Symmetric    Lungs:   Bilateral auscultation distant breath sounds, no rales, - some wheezing but improved- more upper airway today   Chest wall:  No tenderness or deformity. NO intercostal retractions   Heart:  Regular rate and rhythm, S1, S2 normal, no murmur, click, rub or gallop. Abdomen:   Soft, non-tender.  Bowel sounds normal. No masses,  No organomegaly. NO paradoxical motion   Extremities: normal, atraumatic, no cyanosis, trace pedal edema   Pulses: 2+ and symmetric all extremities. Skin: Skin color, texture, turgor normal. No rashes or lesions. NO clubbing   Lymph nodes: Cervical, supraclavicular nodes normal.   Neurologic: Grossly nonfocal      :        Devices: Right IJ-HD Cath            Drips:    DATA:  Labs:  No results for input(s): WBC, HGB, HCT, PLT, HGBEXT, HCTEXT, PLTEXT, HGBEXT, HCTEXT, PLTEXT in the last 72 hours. Recent Labs     09/20/22 0519 09/19/22  0515 09/18/22  0534    142 144   K 4.1 4.6 4.5    110 111   CO2 27 26 29   * 213* 123*   BUN 22* 36* 38*   CREA 1.58* 1.67* 1.57*   CA 8.6 8.8 8.8       Lab Results   Component Value Date/Time    TSH 0.21 (L) 03/19/2021 04:00 AM    Triiodothyronine (T3), free 3.2 02/09/2016 08:47 AM    T4, Free 1.2 10/20/2019 04:23 AM     Lab Results   Component Value Date/Time    NT pro-BNP 2,013 (H) 09/09/2022 07:34 AM    NT pro-BNP 1,871 (H) 08/08/2022 01:48 AM    NT pro-BNP 3,952 (H) 07/08/2022 09:53 AM    NT pro-BNP 1,020 (H) 03/18/2021 01:29 AM    NT pro-BNP 4,929 (H) 10/22/2019 03:30 AM         No results for input(s): PH, PCO2, PO2, HCO3, FIO2 in the last 72 hours. MICRO:  Results       Procedure Component Value Units Date/Time    CULTURE, BLOOD [[id number]] Collected: 09/09/22 0840    Order Status: Completed Specimen: Blood Updated: 09/15/22 0619     Special Requests: NO SPECIAL REQUESTS        Culture result: NO GROWTH 6 DAYS       CULTURE, BLOOD [[id number]] Collected: 09/09/22 0830    Order Status: Completed Specimen: Blood Updated: 09/15/22 0619     Special Requests: NO SPECIAL REQUESTS        Culture result: NO GROWTH 6 DAYS               Imaging:               XR Results (most recent):  Results from Hospital Encounter encounter on 09/09/22    XR CHEST PORT    Narrative  EXAM: XR CHEST PORT    CLINICAL INDICATION/HISTORY: 81 years Female.  S/P Dialysis catheter insertion. Additional History: None    TECHNIQUE: Frontal view of the chest    COMPARISON: 9/12/2022    FINDINGS:    Underpenetration of the lower chest secondary to body habitus. Interval placement of a right IJ approach dual-lumen vascular catheter. The tip  is approximately 5.5 cm distal to the cavoatrial junction. The cardiac silhouette is mild to moderately enlarged increase in size from  prior. Moderate pulmonary vascular congestion and diffuse bilateral interstitial  opacities, increased from prior. Aortic calcifications noted. No definite evidence of pleural effusion or pneumothorax. No acute osseous abnormality appreciated. Impression  Interval dialysis catheter placement, with tip approximately 5.5 cm distal to  the cavoatrial junction; consider retracting to this level. No pneumothorax  detected. Interval enlargement cardiac silhouette enlargement. Interval increased moderate  pulmonary vascular congestion with diffuse bilateral interstitial edema versus  infiltrates. CT Chest 8/8/22  IMPRESSION  1. No CT evidence for central pulmonary embolism.  -Similar enlargement of central pulmonary arteries suggest pulmonary artery  hypertension. 2. Multifocal geographic and peribronchial groundglass airspace infiltrates more  significant in the right lung than left. Imaging appearance is not specifically  characteristic of Covid pneumonia and could reflect viral inflammation or less  likely developing asymmetric pulmonary edema. 3. Multisegmental dependent atelectasis/consolidation right lower lobe adjacent  to a small effusion.  -Similar but less extensive subsegmental atelectasis adjacent to trace left  effusion. Complex decision making was made in the evaluation and management plans during this consultation. More than 50% of time was spent in counseling and coordination of care including review of data and discussion with other team members.       Deborah Rizo DO, [facility]  Pulmonary, Critical Care, and Sleep Medicine

## 2022-09-20 NOTE — DIABETES MGMT
Diabetes/ Glycemic Control Plan of Care  Recommendations:   Blood glucose this am 128 mg/dl  Will continue inpatient monitoring. Fasting/ Morning blood glucose:   Lab Results   Component Value Date/Time    Glucose 132 (H) 09/20/2022 05:19 AM    Glucose (POC) 128 (H) 09/20/2022 08:41 AM     IV Fluids containing dextrose: none   Steroids:  none     Blood glucose values:        Latest Reference Range & Units 9/18/22 21:42 9/19/22 08:19 9/19/22 17:29 9/19/22 21:31 9/20/22 08:41   GLUCOSE,FAST - POC 70 - 110 mg/dL 121 (H) 148 (H) 120 (H) 204 (H) 128 (H)   (H): Data is abnormally high  Within target range (70-180mg/dL):  progressing   Current insulin orders:   Lantus 15 units daily  Lispro corrective insulin coverage AC&HS  Total Daily Dose previous 24 hours = 21 units   Current A1c:   Lab Results   Component Value Date/Time    Hemoglobin A1c 7.2 (H) 06/29/2022 03:12 AM      Equivalent  to an average Blood Glucose of 160 mg/dl for 2-3 months prior to admission  Adequate glycemic control PTA: moderate  Nutrition/Diet:   Active Orders   Diet    ADULT DIET Regular; 4 carb choices (60 gm/meal); Low Sodium (2 gm); Low Potassium (Less than 3000 mg/day)    DIET NPO Ice Chips, Sips of Water with Meds, Sips of Clear Liquids      Meal Intake:  Patient Vitals for the past 168 hrs:   % Diet Eaten   09/19/22 1037 51 - 75%   09/18/22 1736 76 - 100%   09/18/22 1402 76 - 100%   09/18/22 0857 76 - 100%   09/17/22 1814 76 - 100%   09/17/22 1410 76 - 100%   09/17/22 1010 76 - 100%   09/15/22 1915 76 - 100%   09/15/22 1335 51 - 75%   09/15/22 0959 76 - 100%   09/14/22 1745 76 - 100%   09/14/22 1406 76 - 100%   09/14/22 1055 76 - 100%   09/13/22 1837 76 - 100%   09/13/22 1358 76 - 100%   09/13/22 1012 76 - 100%     Supplement Intake:  No data found.     Home diabetes medications:   Key Antihyperglycemic Medications               insulin detemir (LEVEMIR) 100 unit/mL injection 15 units daily for diabetes          Plan/Goals:   Blood glucose will be within target of 70 - 180 mg/dl within 72 hours  Reinforce dietary and medication compliance at home.         Education:  [] Refer to Diabetes Education Record                       [x] Education not indicated at this time     FLORENCIA Benjamin Ala  Ext [phone]

## 2022-09-20 NOTE — PROGRESS NOTES
Interventional Radiology    Consulted for tunneled dialysis catheter placement    We will plan for [facility] tomorrow with moderate sedation.     Hold AM anticoagulation and NPO at midnight for sedation    Full consult note to follow    Thank you,  Gilson Yanez, [address] [place] [address] Drive

## 2022-09-20 NOTE — ROUTINE PROCESS
Wound Prevention Checklist    Patient: Neysa Meckel (26 y.o. female)  Date: 9/20/2022  Diagnosis: Acute respiratory failure with hypoxia ([facility]) [J96.01]  CH[facility]congest[facility] [I50.9] <principal problem not specified>    Precautions: Fall       []  Heel prevention boots placed on patient    [x]  Patient turned q2h during shift  Self turns frequently. []  Lift team ordered    []  Patient on Kelly bed/Specialty bed    []  Each Wound is documented during shift (Stage, Color, drainage, odor, measurements, and dressings)    [x]  Dual skin checks done at bedside during shift report with Cori Choudhary RN  Skin is intact all over. Notes dry skin and scattered IV/needle stick bruises.     Mayela Ramsey RN

## 2022-09-20 NOTE — ROUTINE PROCESS
Wound Prevention Checklist    Patient: Claudine Aparicio (89 y.o. female)  Date: 9/20/2022  Diagnosis: Acute respiratory failure with hypoxia ([facility]) [J96.01]  [facility] [I50.9] <principal problem not specified>    Precautions: Fall       []  Heel prevention boots placed on patient    [x]  Patient turned q2h during shift. ..self turns and repositions in bed. []  Lift team ordered    []  Patient on Kelly bed/Specialty bed    []  Each Wound is documented during shift (Stage, Color, drainage, odor, measurements, and dressings)    [x]  Dual skin checks done at bedside during shift report with Tiffany Natarajan RN    Old healed area to right buttocks is noted with loss of pigmentation. Noted on admission. Skin is intact with scattered small bruises to arms from IV and needle sticks.      Mayela Ramsey RN

## 2022-09-20 NOTE — PROGRESS NOTES
Hospitalist Progress Note    Patient: Virgilio Perez Age: 81 y.o. : 1950 MR#: [id number] SSN: [id number]  Date/Time: 2022 11:24 AM    DOA: 2022  PCP: Tracey Barroso MD    Subjective:       She complained of left sided stomach pain, she tolerated miralax today  Otherwise, she expressed that she does not feel well overall  She tolerated BIPAP QHS  Her breathing is slightly better. She stated that since she came to the hospital, she has difficulty finding word to express herself at time. This has not change since last week. CT head yesterday was benign and without acute process    CT chest this AM preliminary read showed interval resolution of bilateral lung airspace disease  No infiltrate     She agreed to cont HD     Interval Hospital Course:  81 y.o female with HTN, CAD, HFpEF, COPD, TESSA (was previously on CPAP), former tobacco smoker, CKD3b, presented to the hospital with SOB and bilateral lower leg swelling. She has associated chest pain that radiated to her back. No fever. She reported h/o COVID PNA in August.  In the ER, she was observed with hypertensive emergency, tachypneic, hence she was placed on BIPAP. She was given nitro gtt. She was admitted for CHF with flash pulmonary edema required BIPAP, hypertensive emergency, possible PNA. She was given lasix for diuresis, ceftriaxon/doxycycline. Cardiology consulted. Pulmonary consulted. Her diuresis was limited due to worsened renal function. Nephrology was consulted for further care. Due to limited diuresis and worsened uremia, she started on HD 22.   Renal duplex with elevated renovascular resistive index        ROS:  No current fever/chills, no headache, no dizziness, no facial pain, no sinus congestion,   No swallowing pain, No chest pain, no palpitation, + shortness of breath, no abd pain,  No diarrhea, no urinary complaint, no leg pain or swelling       Assessment/Plan:   Acute on chronic diastolic CHF  Hypertensive emergency  Moderate COPD  Acute hypoxic respiratory failure on admission, resolved   Obesity BMI 39  Type 2 diabetes mellitus  CARA on CKD 3  Uremia  TESSA, tolerate BiPAP nightly  Hyperkalemia  Former tobacco smoker  Persistent back pain at the thoracic area    Anxiety due to underlying persistent medical problem. Worsened by her current use of narcotic     She remains stable, there is no focal deficit on examination. Her difficulty in finding word is less likely due acute stroke since her symptom presented on admission and her current CT head did not show any recent change. Suspected she has underlying metabolic component. Hold off on get MRI brain   Will avoid sedative med, will cut back on her gabapentin  Note that she will need [facility] for outpt HD  CT chest noted.  No need for oxygen at this time   Continues bronchodilator, budesonide/arformoterol nebs  BiPAP at night  volume control with dialysis  Cont aspirin, atorvastatin, clonidine, diltiazem, hydralazine  Lantus + ISS  Stop [facility]  Cardiology and pulmonary she follows  PT OT needed  Full code      Disposition planning: home with home health, outpatient HD chair needed   Family updated (none )  Additional Notes:    Time spent > 30 minutes    Case discussed with:  [x]Patient  [x]Family  [x]Nursing  []Case Management  DVT Prophylaxis:  []Lovenox  []Hep SQ  [x]SCDs  []Coumadin   []On Heparin gtt    Signed By: Merari Amaya MD     2022 11:24 AM              Objective:   VS: Visit Vitals  BP (!) 122/49 (BP 1 Location: Right upper arm, BP Patient Position: At rest)   Pulse 76   Temp 98.9 °F (37.2 °C)   Resp 20   Ht 5' 4\" (1.626 m)   Wt 109.2 kg (240 lb 12.8 oz)   SpO2 98%   BMI 41.33 kg/m²      Tmax/24hrs: Temp (24hrs), Av.4 °F (36.9 °C), Min:97.2 °F (36.2 °C), Max:98.9 °F (37.2 °C)    Intake/Output Summary (Last 24 hours) at 2022 1124  Last data filed at 2022 0600  Gross per 24 hour   Intake 240 ml   Output 100 ml   Net 140 ml     Tele: Sinus  General:  Cooperative, Not in acute distress, speaks in short sentence while in bed  HEENT: PERRL, EOMI, supple neck, no JVD, dry oral mucosa  Cardiovascular: S1S2 regular, no rub/gallop, temporary dialysis catheter  Pulmonary: air entry bilaterally,++ wheezing, + crackle  GI:  Soft, non tender, non distended, +bs, no guarding   Extremities: + Pedal edema, +distal pulses appreciated   Neuro: AOx3, moving all extremities, no gross deficit.       Additional:       Current Facility-Administered Medications   Medication Dose Route Frequency    acetaminophen (TYLENOL) tablet 1,000 mg  1,000 mg Oral Q8H    ipratropium (ATROVENT) 0.02 % nebulizer solution 0.5 mg  0.5 mg Nebulization Q6H RT    insulin glargine (LANTUS) injection 15 Units  15 Units SubCUTAneous ACL    0.9% sodium chloride infusion 250 mL  250 mL IntraVENous DIALYSIS PRN    hydrALAZINE (APRESOLINE) tablet 10 mg  10 mg Oral TID    dilTIAZem ER (CARDIZEM CD) capsule 120 mg  120 mg Oral DAILY    [Held by provider] furosemide (LASIX) tablet 40 mg  40 mg Oral DAILY    cloNIDine HCL (CATAPRES) tablet 0.1 mg  0.1 mg Oral BID    arformoteroL (BROVANA) neb solution 15 mcg  15 mcg Nebulization BID RT    budesonide (PULMICORT) 500 mcg/2 ml nebulizer suspension  500 mcg Nebulization BID RT    albuterol-ipratropium (DUO-NEB) 2.5 MG-0.5 MG/3 ML  3 mL Nebulization Q4H PRN    aspirin chewable tablet 81 mg  81 mg Oral DAILY    atorvastatin (LIPITOR) tablet 40 mg  40 mg Oral QHS    gabapentin (NEURONTIN) capsule 400 mg  400 mg Oral DAILY    sodium chloride (NS) flush 5-40 mL  5-40 mL IntraVENous Q8H    sodium chloride (NS) flush 5-40 mL  5-40 mL IntraVENous PRN    acetaminophen (TYLENOL) tablet 650 mg  650 mg Oral Q6H PRN    Or    acetaminophen (TYLENOL) suppository 650 mg  650 mg Rectal Q6H PRN    polyethylene glycol (MIRALAX) packet 17 g  17 g Oral DAILY PRN    ondansetron (ZOFRAN ODT) tablet 4 mg  4 mg Oral Q8H PRN    Or    ondansetron (ZOFRAN) injection 4 mg  4 mg IntraVENous Q6H PRN    insulin lispro (HUMALOG) injection   SubCUTAneous AC&HS    glucose chewable tablet 16 g  16 g Oral PRN    glucagon (GLUCAGEN) injection 1 mg  1 mg IntraMUSCular PRN    dextrose 10% infusion 0-250 mL  0-250 mL IntraVENous PRN    naloxone (NARCAN) injection 0.4 mg  0.4 mg IntraVENous EVERY 2 MINUTES AS NEEDED    [Held by provider] oxyCODONE-acetaminophen (PERCOCET) 5-325 mg per tablet 1 Tablet  1 Tablet Oral Q4H PRN            Lab/Data Review:  Labs: Results:       Chemistry Recent Labs     09/20/22  0519 09/19/22  0515 09/18/22  0534   * 213* 123*    142 144   K 4.1 4.6 4.5    110 111   CO2 27 26 29   BUN 22* 36* 38*   CREA 1.58* 1.67* 1.57*   BUCR 14 22* 24*   AGAP 7 6 4   CA 8.6 8.8 8.8     No results for input(s): TBIL, ALT, ALKP, TP, ALB, GLOB, AGRAT in the last 72 hours. No lab exists for component: SGOT   CBC w/Diff No results for input(s): WBC, RBC, HGB, HCT, MCV, MCH, MCHC, RDW, PLT, BANDS, GRANS, LYMPH, EOS, HGBEXT, HCTEXT, PLTEXT, HGBEXT, HCTEXT, PLTEXT in the last 72 hours. Coagulation No results for input(s): PTP, INR, APTT, INREXT, INREXT in the last 72 hours.     Iron/Ferritin Lab Results   Component Value Date/Time    Iron 44 (L) 03/09/2022 02:27 AM    TIBC 233 (L) 03/09/2022 02:27 AM    Iron % saturation 19 (L) 03/09/2022 02:27 AM    Ferritin 80 08/09/2022 12:09 PM       BNP    Cardiac Enzymes Lab Results   Component Value Date/Time    CK 71 12/16/2021 09:55 PM    CK - MB 2.0 12/16/2021 09:55 PM    CK-MB Index 2.8 12/16/2021 09:55 PM    Troponin-I, QT <0.02 03/19/2021 04:00 AM        Lactic Acid    Thyroid Studies          All Micro Results       Procedure Component Value Units Date/Time    CULTURE, BLOOD [[id number]] Collected: 09/09/22 0830    Order Status: Completed Specimen: Blood Updated: 09/15/22 0619     Special Requests: NO SPECIAL REQUESTS        Culture result: NO GROWTH 6 DAYS       CULTURE, BLOOD [[id number]] Collected: 09/09/22 0840    Order Status: Completed Specimen: Blood Updated: 09/15/22 0619     Special Requests: NO SPECIAL REQUESTS        Culture result: NO GROWTH 6 DAYS                 Images:    CT (Most Recent). XRAY (Most Recent) XR Results (most recent):  Results from Hospital Encounter encounter on 09/09/22    XR CHEST PORT    Narrative  EXAM: XR CHEST PORT    CLINICAL INDICATION/HISTORY: 81 years Female. S/P Dialysis catheter insertion. Additional History: None    TECHNIQUE: Frontal view of the chest    COMPARISON: 9/12/2022    FINDINGS:    Underpenetration of the lower chest secondary to body habitus. Interval placement of a right IJ approach dual-lumen vascular catheter. The tip  is approximately 5.5 cm distal to the cavoatrial junction. The cardiac silhouette is mild to moderately enlarged increase in size from  prior. Moderate pulmonary vascular congestion and diffuse bilateral interstitial  opacities, increased from prior. Aortic calcifications noted. No definite evidence of pleural effusion or pneumothorax. No acute osseous abnormality appreciated. Impression  Interval dialysis catheter placement, with tip approximately 5.5 cm distal to  the cavoatrial junction; consider retracting to this level. No pneumothorax  detected. Interval enlargement cardiac silhouette enlargement. Interval increased moderate  pulmonary vascular congestion with diffuse bilateral interstitial edema versus  infiltrates. EKG Results for orders placed or performed in visit on 08/23/18   [facility] POC EKG ROUTINE W/ 12 LEADS, INTER & REP     Status: None    Impression    See progress note.         2D ECHO

## 2022-09-21 ENCOUNTER — APPOINTMENT (OUTPATIENT)
Dept: INTERVENTIONAL RADIOLOGY/VASCULAR | Age: 72
DRG: 291 | End: 2022-09-21
Attending: PHYSICIAN ASSISTANT
Payer: MEDICARE

## 2022-09-21 ENCOUNTER — HOSPITAL ENCOUNTER (OUTPATIENT)
Dept: INTERVENTIONAL RADIOLOGY/VASCULAR | Age: 72
Discharge: HOME | End: 2022-09-21
Attending: PHYSICIAN ASSISTANT

## 2022-09-21 LAB
ANION GAP SERPL CALC-SCNC: 7 MMOL/L (ref 3–18)
BUN SERPL-MCNC: 29 MG/DL (ref 7–18)
BUN/CREAT SERPL: 19 (ref 12–20)
CALCIUM SERPL-MCNC: 9.1 MG/DL (ref 8.5–10.1)
CHLORIDE SERPL-SCNC: 107 MMOL/L (ref 100–111)
CO2 SERPL-SCNC: 28 MMOL/L (ref 21–32)
CREAT SERPL-MCNC: 1.53 MG/DL (ref 0.6–1.3)
GLUCOSE BLD STRIP.AUTO-MCNC: 114 MG/DL (ref 70–110)
GLUCOSE BLD STRIP.AUTO-MCNC: 127 MG/DL (ref 70–110)
GLUCOSE BLD STRIP.AUTO-MCNC: 130 MG/DL (ref 70–110)
GLUCOSE BLD STRIP.AUTO-MCNC: 92 MG/DL (ref 70–110)
GLUCOSE SERPL-MCNC: 124 MG/DL (ref 74–99)
POTASSIUM SERPL-SCNC: 4.5 MMOL/L (ref 3.5–5.5)
SODIUM SERPL-SCNC: 142 MMOL/L (ref 136–145)

## 2022-09-21 PROCEDURE — C1750 CATH, HEMODIALYSIS,LONG-TERM: HCPCS

## 2022-09-21 PROCEDURE — 02HV33Z INSERTION OF INFUSION DEVICE INTO SUPERIOR VENA CAVA, PERCUTANEOUS APPROACH: ICD-10-PCS | Performed by: RADIOLOGY

## 2022-09-21 PROCEDURE — 74011636637 HC RX REV CODE- 636/637: Performed by: HOSPITALIST

## 2022-09-21 PROCEDURE — 0JH63XZ INSERTION OF TUNNELED VASCULAR ACCESS DEVICE INTO CHEST SUBCUTANEOUS TISSUE AND FASCIA, PERCUTANEOUS APPROACH: ICD-10-PCS | Performed by: RADIOLOGY

## 2022-09-21 PROCEDURE — 99232 SBSQ HOSP IP/OBS MODERATE 35: CPT | Performed by: INTERNAL MEDICINE

## 2022-09-21 PROCEDURE — 36415 COLL VENOUS BLD VENIPUNCTURE: CPT

## 2022-09-21 PROCEDURE — 2709999900 HC NON-CHARGEABLE SUPPLY

## 2022-09-21 PROCEDURE — 74011250637 HC RX REV CODE- 250/637: Performed by: INTERNAL MEDICINE

## 2022-09-21 PROCEDURE — 82962 GLUCOSE BLOOD TEST: CPT

## 2022-09-21 PROCEDURE — 74011000250 HC RX REV CODE- 250: Performed by: RADIOLOGY

## 2022-09-21 PROCEDURE — 90935 HEMODIALYSIS ONE EVALUATION: CPT

## 2022-09-21 PROCEDURE — 74011250636 HC RX REV CODE- 250/636: Performed by: RADIOLOGY

## 2022-09-21 PROCEDURE — 74011250637 HC RX REV CODE- 250/637: Performed by: PHYSICIAN ASSISTANT

## 2022-09-21 PROCEDURE — 80048 BASIC METABOLIC PNL TOTAL CA: CPT

## 2022-09-21 PROCEDURE — 99232 SBSQ HOSP IP/OBS MODERATE 35: CPT | Performed by: EMERGENCY MEDICINE

## 2022-09-21 PROCEDURE — 65270000046 HC RM TELEMETRY

## 2022-09-21 PROCEDURE — 74011000250 HC RX REV CODE- 250: Performed by: INTERNAL MEDICINE

## 2022-09-21 RX ORDER — LIDOCAINE HYDROCHLORIDE AND EPINEPHRINE 20; 5 MG/ML; UG/ML
20 INJECTION, SOLUTION EPIDURAL; INFILTRATION; INTRACAUDAL; PERINEURAL ONCE
Status: DISCONTINUED | OUTPATIENT
Start: 2022-09-21 | End: 2022-09-21 | Stop reason: SDUPTHER

## 2022-09-21 RX ORDER — HEPARIN SODIUM 1000 [USP'U]/ML
5000 INJECTION, SOLUTION INTRAVENOUS; SUBCUTANEOUS ONCE
Status: ACTIVE | OUTPATIENT
Start: 2022-09-21 | End: 2022-09-21

## 2022-09-21 RX ORDER — OXYCODONE AND ACETAMINOPHEN 5; 325 MG/1; MG/1
1 TABLET ORAL ONCE
Status: COMPLETED | OUTPATIENT
Start: 2022-09-22 | End: 2022-09-21

## 2022-09-21 RX ORDER — LIDOCAINE HYDROCHLORIDE AND EPINEPHRINE 20; 5 MG/ML; UG/ML
10 INJECTION, SOLUTION EPIDURAL; INFILTRATION; INTRACAUDAL; PERINEURAL ONCE
Status: COMPLETED | OUTPATIENT
Start: 2022-09-21 | End: 2022-09-21

## 2022-09-21 RX ORDER — HEPARIN SODIUM 1000 [USP'U]/ML
5000 INJECTION, SOLUTION INTRAVENOUS; SUBCUTANEOUS ONCE
Status: COMPLETED | OUTPATIENT
Start: 2022-09-21 | End: 2022-09-21

## 2022-09-21 RX ADMIN — CEFAZOLIN 2 G: 1 INJECTION, POWDER, FOR SOLUTION INTRAMUSCULAR; INTRAVENOUS at 23:52

## 2022-09-21 RX ADMIN — HEPARIN SODIUM 4000 UNITS: 1000 INJECTION INTRAVENOUS; SUBCUTANEOUS at 15:22

## 2022-09-21 RX ADMIN — HYDRALAZINE HYDROCHLORIDE 10 MG: 10 TABLET, FILM COATED ORAL at 09:45

## 2022-09-21 RX ADMIN — Medication 15 UNITS: at 12:02

## 2022-09-21 RX ADMIN — ATORVASTATIN CALCIUM 40 MG: 40 TABLET, FILM COATED ORAL at 23:20

## 2022-09-21 RX ADMIN — LIDOCAINE HYDROCHLORIDE,EPINEPHRINE BITARTRATE 200 MG: 20; .005 INJECTION, SOLUTION EPIDURAL; INFILTRATION; INTRACAUDAL; PERINEURAL at 15:00

## 2022-09-21 RX ADMIN — ACETAMINOPHEN 1000 MG: 500 TABLET ORAL at 23:20

## 2022-09-21 RX ADMIN — GABAPENTIN 400 MG: 400 CAPSULE ORAL at 09:45

## 2022-09-21 RX ADMIN — CLONIDINE HYDROCHLORIDE 0.1 MG: 0.1 TABLET ORAL at 09:45

## 2022-09-21 RX ADMIN — OXYCODONE HYDROCHLORIDE AND ACETAMINOPHEN 1 TABLET: 5; 325 TABLET ORAL at 23:51

## 2022-09-21 RX ADMIN — HYDRALAZINE HYDROCHLORIDE 10 MG: 10 TABLET, FILM COATED ORAL at 23:20

## 2022-09-21 RX ADMIN — SODIUM CHLORIDE, PRESERVATIVE FREE 10 ML: 5 INJECTION INTRAVENOUS at 07:51

## 2022-09-21 RX ADMIN — SODIUM CHLORIDE, PRESERVATIVE FREE 10 ML: 5 INJECTION INTRAVENOUS at 23:30

## 2022-09-21 RX ADMIN — DILTIAZEM HYDROCHLORIDE 120 MG: 120 CAPSULE, COATED, EXTENDED RELEASE ORAL at 09:45

## 2022-09-21 NOTE — PROGRESS NOTES
Discharge/Transition Planning       Case Management following and chart reviewed. Noted CM note stating oxygen orders sent to [facility] for home on 9/16 . There is no home oxygen orders on patient and no qualifying o2 testing. Patient has been on room air now since late on 9/18         Will verify discharge plan with patient but appears looks appropriate for home with home health. Will verify if patient needs rolling walker . Home Health orders will need to be updated with date as they are over week old .

## 2022-09-21 NOTE — ROUTINE PROCESS
rnBedside shift change report given to Carl RN (oncoming nurse) by Gilda Paul RN (offgoing nurse). Report included the following information SBAR, Kardex, MAR, Recent Results, and Cardiac Rhythm SR . Patient asleep on rounds, NPO. Call light in reach, patient encouraged to call for assistance as needed.

## 2022-09-21 NOTE — PROGRESS NOTES
Progress Note  Pulmonary, Critical Care, and Sleep Medicine    Name: Dianelys Carter MRN: [id number]   : 1950 Hospital: [facility]   Date: 2022        IMPRESSION:   Abnormal CT Chest Imagin2022- diffuse asymmetric GGOs and atelectasis- most likely from pulmonary edema- will need follow up imaging    Wheezing in a former smoker likely bronchospasm/undiagnosed COPD vs \"cardiac asthma\" in the setting of chronic systolic/diastolic heart failure, favor the latter Echo from  with EF 45% and mild to moderate mitral regurgitation  Acute hypoxic respiratory failure related to flash pulmonary edema. On O2 2 LPM  Hypertensive crisis, resolved  TESSA  Was followed by Dr. Yeyo Lara- Previously on unknown PAP therapy. Device was recalled about 2 years ago an the patient was lost to follow up. DM 2  Acute on chronic kidney disease with increasing BUN/Creatinine  Hyperkalemia- improved,   Ground glass opacities on CT from August probably due to COVID vs heart failure  History of COPD although PFT in  consistent with restrictive lung disease  History of STEMI in 2018 requiring intubation and mechanical ventilation  Former smoker 46 PY, quit in 2019  Chronic pain syndrome  Obesity Body Mass Index Body mass index is 41.81 kg/m². Recent COVID positive       PLAN:   HD per nephrology   Plan for tunneled HD cath placement today    Continue BIPAP HS and PRN gregor   Maintain aspiration precautions  SpO2 goal: 90-94%- Avoid over oxygenation    Unable to R/O COPD at this time.   Continue with Brovana/Pulmicort while in-patient  May switch back to [facility] on discharge and PRN albuterol  Will add scheduled Atrovent today and monitor for response  Continue PRN bronchodilators  Obtain CT neck non-con tomorrow- suspect upper airway wheezing from redundant soft tissue but patient is a prior smoker- so we should evaluate further  OP ENT evaluation pending results of CT neck    Prophylaxis issues and glycemic control per primary team  PT/OT/OOBTC as clinical status allows    Assess SpO2 at rest and with ambulation prior to discharge to assess for possible need of OP supplemental oxygen. IF patient want to continue outpatient PAP therapy, then the patient will need to follow up with OP Sleep provider, Dr. Clarence Srivastava. Outpatient Pulmonary follow up:PFT's and 6 minute walk to further characterize deficits. Suspect more of restrictive disorder        Subjective/Interval History:   I have reviewed the flowsheet and previous days notes. Reviewed interval history. Discussed management with nursing staff. In regards to OP- PAP therapy. The patient was seeing Dr. Clarence Srivastava. She was using a Jaime - PAP device: unknown settings and mode. The patient's device was then recalled and the patient stopped using her device. She reports that she did not know she had to register her device. She does not know who her DME was. Prior to recall, the patient reports using her device without difficulty. No Intolerance issues      Interval Evaluation  09/21/22  LOS: 12        Right HD-IJC placed 9/16/22- plan to change to tunneled cath tomorrow and proceed with HD as Outpatient  HD first treatment 9/17/22    Patient resting in bed. Transferred to  overnight. She is frustrated with her medical issues and not knowing what to expect with newly starting HD  Plan for tunneled catheter- possibly today- she is anxious to get this donse  Yesterday's left sided discomfort resolved  Still some wheezing in neck region- I discussed this with the patient.   She is agreeable to obtaining non-con neck CT  Afebrile  No sputum , No hemoptysis, No cough          Patient Vitals for the past 24 hrs:   Temp Pulse Resp BP SpO2   09/21/22 1221 98.2 °F (36.8 °C) 67 16 (!) 144/65 95 %   09/21/22 0722 98.3 °F (36.8 °C) 69 18 117/64 95 %   09/21/22 0424 98.5 °F (36.9 °C) 67 20 (!) 101/54 95 %   09/21/22 0056 98.6 °F (37 °C) 77 20 123/76 94 %   09/21/22 0001 98.6 °F (37 °C) 80 18 (!) 146/79 97 %   22 2039 98.3 °F (36.8 °C) 76 24 (!) 134/49 95 %   22 1736 99.6 °F (37.6 °C) 84 19 108/69 98 %       Inpat Anti-Infectives (From admission, onward)      None              ROS:Pertinent items are noted in HPI. Orders reviewed including medications. Changes made if indicated. Telemetry monitor reviewed at the bedside. Objective:   Vital Signs:    Visit Vitals  BP (!) 144/65 (BP 1 Location: Right upper arm, BP Patient Position: At rest)   Pulse 67   Temp 98.2 °F (36.8 °C)   Resp 16   Ht 5' 4\" (1.626 m)   Wt 110.5 kg (243 lb 9.6 oz)   SpO2 95%   BMI 41.81 kg/m²       O2 Device: None (Room air)   O2 Flow Rate (L/min): 2 l/min   Temp (24hrs), Av.6 °F (37 °C), Min:98.2 °F (36.8 °C), Max:99.6 °F (37.6 °C)       Intake/Output:   Last shift:      No intake/output data recorded. Last 3 shifts:  1901 -  0700  In: 480 [P.O.:480]  Out: 550 [Urine:550]    Intake/Output Summary (Last 24 hours) at 2022 1558  Last data filed at 2022 1010  Gross per 24 hour   Intake 0 ml   Output 50 ml   Net -50 ml          Physical Exam:    General:  Alert, cooperative, in no distress, appears stated age. Obese    Head:  Normocephalic, without obvious abnormality, atraumatic. Eyes:  ANicteric, PERRL,   Nose: Nares normal. Mucosa normal. No drainage or sinus tenderness. Throat: Lips, mucosa, and tongue normal.  No exudate   Neck: Supple, symmetrical, trachea midline, no adenopathy, thyroid: no enlargment/tenderness/nodules, no carotid bruit and no JVD , + wheezing in upper airway   Back:   Symmetric    Lungs:   Bilateral auscultation distant breath sounds, no rales, - some wheezing but improved- more upper airway today   Chest wall:  No tenderness or deformity. NO intercostal retractions   Heart:  Regular rate and rhythm, S1, S2 normal, no murmur, click, rub or gallop. Abdomen:   Soft, non-tender. Bowel sounds normal. No masses,  No organomegaly.  NO paradoxical motion   Extremities: normal, atraumatic, no cyanosis, trace pedal edema   Pulses: 2+ and symmetric all extremities. Skin: Skin color, texture, turgor normal. No rashes or lesions. NO clubbing   Lymph nodes: Cervical, supraclavicular nodes normal.   Neurologic: Grossly nonfocal      :        Devices: Right IJ-HD Cath            Drips:    DATA:  Labs:  No results for input(s): WBC, HGB, HCT, PLT, HGBEXT, HCTEXT, PLTEXT, HGBEXT, HCTEXT, PLTEXT in the last 72 hours. Recent Labs     09/21/22  0355 09/20/22  0519 09/19/22  0515    139 142   K 4.5 4.1 4.6    105 110   CO2 28 27 26   * 132* 213*   BUN 29* 22* 36*   CREA 1.53* 1.58* 1.67*   CA 9.1 8.6 8.8       Lab Results   Component Value Date/Time    TSH 0.21 (L) 03/19/2021 04:00 AM    Triiodothyronine (T3), free 3.2 02/09/2016 08:47 AM    T4, Free 1.2 10/20/2019 04:23 AM     Lab Results   Component Value Date/Time    NT pro-BNP 2,013 (H) 09/09/2022 07:34 AM    NT pro-BNP 1,871 (H) 08/08/2022 01:48 AM    NT pro-BNP 3,952 (H) 07/08/2022 09:53 AM    NT pro-BNP 1,020 (H) 03/18/2021 01:29 AM    NT pro-BNP 4,929 (H) 10/22/2019 03:30 AM         No results for input(s): PH, PCO2, PO2, HCO3, FIO2 in the last 72 hours. MICRO:  Results       Procedure Component Value Units Date/Time    CULTURE, BLOOD [[id number]] Collected: 09/09/22 0840    Order Status: Completed Specimen: Blood Updated: 09/15/22 0619     Special Requests: NO SPECIAL REQUESTS        Culture result: NO GROWTH 6 DAYS       CULTURE, BLOOD [[id number]] Collected: 09/09/22 0830    Order Status: Completed Specimen: Blood Updated: 09/15/22 0619     Special Requests: NO SPECIAL REQUESTS        Culture result: NO GROWTH 6 DAYS               Imaging:               XR Results (most recent):  Results from Hospital Encounter encounter on 09/09/22    XR CHEST PORT    Narrative  EXAM: XR CHEST PORT    CLINICAL INDICATION/HISTORY: 81 years Female. S/P Dialysis catheter insertion.   Additional History: None    TECHNIQUE: Frontal view of the chest    COMPARISON: 9/12/2022    FINDINGS:    Underpenetration of the lower chest secondary to body habitus. Interval placement of a right IJ approach dual-lumen vascular catheter. The tip  is approximately 5.5 cm distal to the cavoatrial junction. The cardiac silhouette is mild to moderately enlarged increase in size from  prior. Moderate pulmonary vascular congestion and diffuse bilateral interstitial  opacities, increased from prior. Aortic calcifications noted. No definite evidence of pleural effusion or pneumothorax. No acute osseous abnormality appreciated. Impression  Interval dialysis catheter placement, with tip approximately 5.5 cm distal to  the cavoatrial junction; consider retracting to this level. No pneumothorax  detected. Interval enlargement cardiac silhouette enlargement. Interval increased moderate  pulmonary vascular congestion with diffuse bilateral interstitial edema versus  infiltrates. CT Chest 8/8/22  IMPRESSION  1. No CT evidence for central pulmonary embolism.  -Similar enlargement of central pulmonary arteries suggest pulmonary artery  hypertension. 2. Multifocal geographic and peribronchial groundglass airspace infiltrates more  significant in the right lung than left. Imaging appearance is not specifically  characteristic of Covid pneumonia and could reflect viral inflammation or less  likely developing asymmetric pulmonary edema. 3. Multisegmental dependent atelectasis/consolidation right lower lobe adjacent  to a small effusion.  -Similar but less extensive subsegmental atelectasis adjacent to trace left  effusion. Complex decision making was made in the evaluation and management plans during this consultation. More than 50% of time was spent in counseling and coordination of care including review of data and discussion with other team members.       Rubio Chi DO, [facility]    [facility]  Pulmonary, Critical Care, and Sleep Medicine

## 2022-09-21 NOTE — CONSULTS
Preprocedure Assessment      Today 9/21/2022     Indication/Symptoms:  Marianna Wen is a 81 y. o.with renal failure here for a tunneled dialysis catheter placement    The H & P and/or progress notes and any available imaging were reviewed. The risks, indications and possible alternatives to the procedure, including doing nothing, were discussed and informed consent was obtained.     Physical Exam:     General: A&O x 4, NAD   Heart:   RRR  Lungs:    Normal work of breathing    The patient is an appropriate candidate to undergo the planned procedure    LORE Vallejo

## 2022-09-21 NOTE — DIALYSIS
ACUTE HEMODIALYSIS TREATMENT    HEMODIALYSIS ORDERS: Physician: Dr. Golden Puri     Dialyzer: Revaclear   Duration: 3 hr   BFR: 300   DFR: 500   Dialysate:  Temp 36-37*C   K+  2    Ca+ 2.5   Na    Bicarb 35   Wt Readings from Last 1 Encounters:   09/21/22 110.5 kg (243 lb 9.6 oz)    Patient Chart [x]   Unable to Obtain []  Dry weight/UF Goal: 2000 ml    Heparin []  Bolus    Units    [] Hourly    Units    [x]None       Pre BP:   125/63   Pulse:  75   Respirations: 18   Temp:  97.4  [] Oral [] Axillary [] Esophageal   Labs: []  Pre  []  Post:   [x] N/A   Additional Orders(medications, blood products, hypotension management): [] Yes   [] No     [x]  [facility] Consent Verified     CATHETER ACCESS:  []N/A   [x]Right   []Left   []IJ   []Fem  [x]Chest wall  []TransHepatic   [] First use X-ray verified     [x]Tunnel    [] Non Tunneled   [x]No S/S infection  []Redness  []Drainage []Cultured []Swelling []Pain   [x]Medical Aseptic Prep Utilized   []Dressing Changed  [x] Biopatch  Date:    []Clotted   [x]Patent   Flows: [x]Good  []Poor  []Reversed   If access problem,  notified: []Yes    [x]N/A          GENERAL ASSESSMENT:    LUNGS:  Resp Rate 18   [] Clear  [x] Coarse  [] Crackles  [] Wheezing  [x] Diminished         Respirations:  [x]Easy  []Labored  []N/A  Cough:  []Productive  [x]Dry  []N/A      Therapy:  []RA  O2 Type:  []NC  Mask: []  NRB    [] BiPaP  Flow:  2 l/min                   [] Ventilated  [] Intubated  [] Trach     CARDIAC: [] Regular      [] Irregular   [] Rhythm:          [] Monitored   [] Bedside   [] Remotely monitored     EDEMA: [] None   [x]Generalized  [] Pitting [] 1+   [] 2 +   [] 3+    [] 4+  [] Pedal    SKIN:   [x] Warm  [] Hot     [] Cold   [x] Dry     [] Pale   [] Diaphoretic                  [] Flushed  [] Jaundiced  [] Cyanotic     LOC:    [x] Alert      [x]Oriented:    [x] Person     [] Place  []Time               [] Confused  [] Lethargic  [] Medicated  [] Non-responsive  [] Non Verbal GI / ABDOMEN:                     [] Flat    [] Distended    [x] Soft    [] Firm   []  Obese                   [] Diarrhea   [] FMS [x] Bowel Sounds  [] Nausea  [] Vomiting                   [] NGT  [] OGT    [] PEG  [] Tube Feedings @     / URINE ASSESSMENT:                   [] Voiding  []  Frye  [x] Oliguria  [] Anuria                     [] Incontinent  []  Incontinent Brief   []  PureWick     PAIN:  [x] 0 []1  []2   []3   []4   []5   []6   []7   []8   []9   []10                MOBILITY:  [x] Bed    [] Stretcher      All Vitals and Treatment Details on Attached [address]: [facility]          Room # 488/08    [x] Routine         [] 1st Time Acute/Chronic   [] Urgent      [] Stat            [x] Acute Room   []  Bedside    [] ICU/CCU     [] ER   Isolation Precautions:  [x] Dialysis    There are currently no Active Isolations     ALLERGIES:     No Known Allergies   Code Status:  Full Code     Hepatitis Status      Lab Results   Component Value Date/Time    Hepatitis A, IgM NEGATIVE 03/23/2015 09:04 AM    Hepatitis B surface Ag 0.31 09/16/2022 11:52 AM    Hepatitis B surface Ab <3.10 (L) 09/16/2022 11:52 AM    Hepatitis B core, IgM NEGATIVE 03/23/2015 09:04 AM    Hepatitis C virus Ab 0.09 03/23/2015 09:04 AM        Current Labs:      Lab Results   Component Value Date/Time    WBC 11.2 09/17/2022 05:48 AM    HGB 8.8 (L) 09/17/2022 05:48 AM    HCT 28.9 (L) 09/17/2022 05:48 AM    PLATELET 297 76/77/8237 05:48 AM    MCV 95.4 09/17/2022 05:48 AM     Lab Results   Component Value Date/Time    Sodium 142 09/21/2022 03:55 AM    Potassium 4.5 09/21/2022 03:55 AM    Chloride 107 09/21/2022 03:55 AM    CO2 28 09/21/2022 03:55 AM    Anion gap 7 09/21/2022 03:55 AM    Glucose 124 (H) 09/21/2022 03:55 AM    BUN 29 (H) 09/21/2022 03:55 AM    Creatinine 1.53 (H) 09/21/2022 03:55 AM    BUN/Creatinine ratio 19 09/21/2022 03:55 AM    GFR est AA 41 (L) 09/21/2022 03:55 AM    GFR est non-AA 34 (L) 09/21/2022 03:55 AM Calcium 9.1 09/21/2022 03:55 AM          DIET:  DIET ADULT     PRIMARY NURSE REPORT:   Pre Dialysis: Ant Canales RN    Time: 1611      EDUCATION:    [x] Patient           Knowledge Basis: []None [x]Minimal [] Substantial [] Unknown  Barriers to learning  [x]N/A  [] Intubated/Trached/Ventilated  [] Sedated/Paralyzed   [] Access Care     [] S&S of infection  [] Fluid Management  [] K+   [x] Procedural    [] Medications   [] Tx Options   [] Transplant   [] Diet      Teaching Tools:  [x] Explain  [] Demo  [] Handouts [] Video  Patient response: [] Verbalized understanding   [x] Requires follow up        [x] Time Out/Safety Check    [x] Extracorporeal Circuit Tested for integrity       RO/HEMODIALYSIS MACHINE SAFETY CHECKS - Before each treatment:        [address] [place] [address]                                     [x] Unit Machine # 8 with centralized RO                                    [] Portable Machine #1/RO serial # [id number]                                  [] Portable Machine #2/RO serial # [id number]                                  [] Portable Machine #4/RO serial # [id number]                                  [] Portable Machine #10/RO serial #  [id number]                                                                                                         Alarm Test:  Pass time 1847            [x] RO/Machine Log Complete    Machine Temp    36-37*C             Dialysate: pH 7.4    Conductivity: Meter 13.8   HD Machine  13.9     TCD: 13.8  Dialyzer Lot # [id number]     Blood Tubing Lot # [id number]     Saline Lot # [id number]     CHLORINE TESTING-Before each treatment and every 4 hours    Total Chlorine: [x] less than 0.1 ppm  Initial Time Check: 1700       4 Hr/2nd Check Time: 2100   (if greater than 0.1 ppm from Primary then every 30 minutes from Secondary)     TREATMENT INITIATION - with Dialysis Precautions:   [x] All Connections Secured              [x] Saline Line Double Clamped   [x] Venous Parameters Set               [x] Arterial Parameters Set    [x] Prime Given 250ml NSS              [x]Air Foam Detector Engaged      Treatment Initiation Note:    Patient arrived the unit instable condition, assessed and  stable for treatment. R chest TDC Acces with no signs or symptoms of complication. Treatment initiated and monitored be Tamara bill. During Treatment Notes:  1945  Vascular access visible with arterial and venous line connections intact. Pt resting comfortably. 2100  Vascular access visible with arterial and venous line connections intact. Pt resting comfortably. 2200  Vascular access visible with arterial and venous line connections intact. Pt resting comfortably. 2239  Dialysis treatment complete. Medication Dose Volume Route Time Thony Nurse, Title      EVERARDO Whittaker, RN      EVERARDO Whittaker, RN      EVERARDO Whittaker, RN     Post Assessment  Dialyzer Cleared:   [] Good  [x] Fair  [] Poor  Blood processed:  58 L  UF Removed:  2000 Ml    Post /58   Pulse  76 Resp  18  Temp 98 Lungs: [] Clear [x] Course  [] Crackles                 []  Wheezing   [x] Diminished   Post Tx Vascular Access: [x] N/A Cardiac :[x] Regular   [] Irregular   Rhythm:  [] Monitored   [] Not Monitored    CVC Catheter: [] N/A  Locking solution: Heparin 1:1000 U  Arterial port 1.9 ml   Venous port 1.9 ml   Edema:  [] None   [x]Generalized                     Skin:[x] Warm  [x] Dry [] Diaphoretic               [] Flushed  [] Pale [] Cyanotic Pain:  [x]0  []1 []2  []3 []4  []5  []6  []7 []8    []9  []10     Post Treatment Note:    Patient completed and  tolerated 3 hrs of hemo dialysis. CVC assessed no abnormalities noted, line patent with good flow.        POST TREATMENT PRIMARY NURSE HANDOFF REPORT:   Post Dialysis: Isabel Lea RN               Time:  2249       Abbreviations: AVG-arterial venous graft, AVF-arterial venous fistula, IJ-Internal Jugular, Subcl-Subclavian, Fem-Femoral, Tx-treatment, AP/HR-apical heart rate, VSS- Vital Signs Stable, CVC- Central Venous Catheter, DFR-dialysate flow rate, BFR-blood flow rate, AP-arterial pressure, -venous pressure, UF-ultrafiltrate, TMP-transmembrane pressure, Evelio-Venous, Art-Arterial, RO-Reverse Osmosis

## 2022-09-21 NOTE — PROGRESS NOTES
Interventional Radiology    Notified by Dr. Jean-Claude Medina the patient's renal function is stabilizing and TDC is not needed at this time. IR will remain available as needed. Please re consult IR if [facility] is required. OK for diet from our standpoint. ADDENDUM: Patient would like to proceed with TDC.  Please maintain NPO    Thank you,  Thad Castle, [address]

## 2022-09-21 NOTE — PROGRESS NOTES
Discharge/Transition Planning     [facility] accepting patient .  Patient not ready to discharge as needs dialysis chair

## 2022-09-21 NOTE — PROGRESS NOTES
Hospitalist Progress Note    Patient: Eligio Haq Age: 81 y.o. : 1950 MR#: [id number] SSN: [id number]  Date/Time: 2022 11:24 AM    DOA: 2022  PCP: Jose Luis Barbosa MD    Subjective:       Patient is sitting in bed in no apparent distress, awake and alert    Interval Hospital Course:  81 y.o female with HTN, CAD, HFpEF, COPD, TESSA (was previously on CPAP), former tobacco smoker, CKD3b, presented to the hospital with SOB and bilateral lower leg swelling. She has associated chest pain that radiated to her back. No fever. She reported h/o COVID PNA in August.  In the ER, she was observed with hypertensive emergency, tachypneic, hence she was placed on BIPAP. She was given nitro gtt. She was admitted for CHF with flash pulmonary edema required BIPAP, hypertensive emergency, possible PNA. She was given lasix for diuresis, ceftriaxon/doxycycline. Cardiology consulted. Pulmonary consulted. Her diuresis was limited due to worsened renal function. Nephrology was consulted for further care. Due to limited diuresis and worsened uremia, she started on HD 22. Renal duplex with elevated renovascular resistive index            Assessment/Plan:   Acute on chronic diastolic CHF  Hypertensive emergency  Moderate COPD  Acute hypoxic respiratory failure on admission, resolved   Obesity BMI 39  Type 2 diabetes mellitus  CARA on CKD 3  Uremia  TESSA, tolerate BiPAP nightly  Hyperkalemia  Former tobacco smoker  Persistent back pain at the thoracic area    Anxiety due to underlying persistent medical problem.  Worsened by her current use of narcotic     Plans for hemodialysis and temporary dialysis catheter noted  Pulmonary is following, follow CT of the neck  Judicious use of gabapentin  Continues bronchodilator, budesonide/arformoterol nebs  BiPAP nightly per pulmonary  On aspirin statin clonidine diltiazem and hydralazine  Lantus + ISS  Stop Jess Perfect  Cardiology input noted  PT and OT  Discussed with patient  Full code    [facility] with home health care once outpatient hemodialysis arranged  Anticipated date of discharge is     Case discussed with:  [x]Patient  [x]Family  [x]Nursing  []Case Management  DVT Prophylaxis:  []Lovenox  []Hep SQ  [x]SCDs  []Coumadin   []On Heparin gtt    Signed By: Yasmany Kim MD     2022              Objective:   VS: Visit Vitals  /65 (BP 1 Location: Left upper arm)   Pulse 68   Temp 98.6 °F (37 °C)   Resp 20   Ht 5' 4\" (1.626 m)   Wt 110.5 kg (243 lb 9.6 oz)   SpO2 98%   BMI 41.81 kg/m²      Tmax/24hrs: Temp (24hrs), Av.6 °F (37 °C), Min:98.2 °F (36.8 °C), Max:99.6 °F (37.6 °C)    Intake/Output Summary (Last 24 hours) at 2022 1654  Last data filed at 2022 1010  Gross per 24 hour   Intake 0 ml   Output 50 ml   Net -50 ml     General:  Awake, alert  Cardiovascular:  S1S2+, RRR  Pulmonary: Coarse breath sounds bilaterally  GI:  Soft, BS+, NT, ND, obese  Extremities:  ++ edema        Current Facility-Administered Medications   Medication Dose Route Frequency    ceFAZolin (ANCEF) 2 g in sterile water (preservative free) 20 mL IV syringe  2 g IntraVENous ONCE    acetaminophen (TYLENOL) tablet 1,000 mg  1,000 mg Oral Q8H    ipratropium (ATROVENT) 0.02 % nebulizer solution 0.5 mg  0.5 mg Nebulization Q6H RT    insulin glargine (LANTUS) injection 15 Units  15 Units SubCUTAneous ACL    0.9% sodium chloride infusion 250 mL  250 mL IntraVENous DIALYSIS PRN    hydrALAZINE (APRESOLINE) tablet 10 mg  10 mg Oral TID    dilTIAZem ER (CARDIZEM CD) capsule 120 mg  120 mg Oral DAILY    furosemide (LASIX) tablet 40 mg  40 mg Oral DAILY    cloNIDine HCL (CATAPRES) tablet 0.1 mg  0.1 mg Oral BID    arformoteroL (BROVANA) neb solution 15 mcg  15 mcg Nebulization BID RT    budesonide (PULMICORT) 500 mcg/2 ml nebulizer suspension  500 mcg Nebulization BID RT    albuterol-ipratropium (DUO-NEB) 2.5 MG-0.5 MG/3 ML  3 mL Nebulization Q4H PRN    aspirin chewable tablet 81 mg  81 mg Oral DAILY    atorvastatin (LIPITOR) tablet 40 mg  40 mg Oral QHS    gabapentin (NEURONTIN) capsule 400 mg  400 mg Oral DAILY    sodium chloride (NS) flush 5-40 mL  5-40 mL IntraVENous Q8H    sodium chloride (NS) flush 5-40 mL  5-40 mL IntraVENous PRN    acetaminophen (TYLENOL) tablet 650 mg  650 mg Oral Q6H PRN    Or    acetaminophen (TYLENOL) suppository 650 mg  650 mg Rectal Q6H PRN    polyethylene glycol (MIRALAX) packet 17 g  17 g Oral DAILY PRN    ondansetron (ZOFRAN ODT) tablet 4 mg  4 mg Oral Q8H PRN    Or    ondansetron (ZOFRAN) injection 4 mg  4 mg IntraVENous Q6H PRN    insulin lispro (HUMALOG) injection   SubCUTAneous AC&HS    glucose chewable tablet 16 g  16 g Oral PRN    glucagon (GLUCAGEN) injection 1 mg  1 mg IntraMUSCular PRN    dextrose 10% infusion 0-250 mL  0-250 mL IntraVENous PRN    naloxone (NARCAN) injection 0.4 mg  0.4 mg IntraVENous EVERY 2 MINUTES AS NEEDED    [Held by provider] oxyCODONE-acetaminophen (PERCOCET) 5-325 mg per tablet 1 Tablet  1 Tablet Oral Q4H PRN     Facility-Administered Medications Ordered in Other Encounters   Medication Dose Route Frequency    heparin (porcine) 1,000 unit/mL injection 5,000 Units  5,000 Units IntraCATHeter ONCE            Lab/Data Review:  Labs: Results:       Chemistry Recent Labs     09/21/22  0355 09/20/22  0519 09/19/22  0515   * 132* 213*    139 142   K 4.5 4.1 4.6    105 110   CO2 28 27 26   BUN 29* 22* 36*   CREA 1.53* 1.58* 1.67*   BUCR 19 14 22*   AGAP 7 7 6   CA 9.1 8.6 8.8     No results for input(s): TBIL, ALT, ALKP, TP, ALB, GLOB, AGRAT in the last 72 hours. No lab exists for component: SGOT   CBC w/Diff No results for input(s): WBC, RBC, HGB, HCT, MCV, MCH, MCHC, RDW, PLT, BANDS, GRANS, LYMPH, EOS, HGBEXT, HCTEXT, PLTEXT, HGBEXT, HCTEXT, PLTEXT in the last 72 hours. Coagulation No results for input(s): PTP, INR, APTT, INREXT, INREXT in the last 72 hours.     Iron/Ferritin Lab Results Component Value Date/Time    Iron 44 (L) 03/09/2022 02:27 AM    TIBC 233 (L) 03/09/2022 02:27 AM    Iron % saturation 19 (L) 03/09/2022 02:27 AM    Ferritin 80 08/09/2022 12:09 PM       BNP    Cardiac Enzymes Lab Results   Component Value Date/Time    CK 71 12/16/2021 09:55 PM    CK - MB 2.0 12/16/2021 09:55 PM    CK-MB Index 2.8 12/16/2021 09:55 PM    Troponin-I, QT <0.02 03/19/2021 04:00 AM        Lactic Acid    Thyroid Studies          All Micro Results       Procedure Component Value Units Date/Time    CULTURE, BLOOD [[id number]] Collected: 09/09/22 0830    Order Status: Completed Specimen: Blood Updated: 09/15/22 0619     Special Requests: NO SPECIAL REQUESTS        Culture result: NO GROWTH 6 DAYS       CULTURE, BLOOD [[id number]] Collected: 09/09/22 0840    Order Status: Completed Specimen: Blood Updated: 09/15/22 0619     Special Requests: NO SPECIAL REQUESTS        Culture result: NO GROWTH 6 DAYS                 Images:    CT (Most Recent). XRAY (Most Recent) XR Results (most recent):  Results from Hospital Encounter encounter on 09/09/22    XR CHEST PORT    Narrative  EXAM: XR CHEST PORT    CLINICAL INDICATION/HISTORY: 81 years Female. S/P Dialysis catheter insertion. Additional History: None    TECHNIQUE: Frontal view of the chest    COMPARISON: 9/12/2022    FINDINGS:    Underpenetration of the lower chest secondary to body habitus. Interval placement of a right IJ approach dual-lumen vascular catheter. The tip  is approximately 5.5 cm distal to the cavoatrial junction. The cardiac silhouette is mild to moderately enlarged increase in size from  prior. Moderate pulmonary vascular congestion and diffuse bilateral interstitial  opacities, increased from prior. Aortic calcifications noted. No definite evidence of pleural effusion or pneumothorax. No acute osseous abnormality appreciated.     Impression  Interval dialysis catheter placement, with tip approximately 5.5 cm distal to  the cavoatrial junction; consider retracting to this level. No pneumothorax  detected. Interval enlargement cardiac silhouette enlargement. Interval increased moderate  pulmonary vascular congestion with diffuse bilateral interstitial edema versus  infiltrates. EKG Results for orders placed or performed in visit on 08/23/18   AMB POC EKG ROUTINE W/ 12 LEADS, INTER & REP     Status: None    Impression    See progress note.         2D ECHO

## 2022-09-21 NOTE — ROUTINE PROCESS
TRANSFER - IN REPORT:    Telephone report received from Chirag Pérez RN(name) on Algis Plants  being received from [facility](unit) for routine progression of care      Report consisted of patients Situation, Background, Assessment and   Recommendations(SBAR). Information from the following report(s) SBAR, Kardex, MAR, Recent Results, and Cardiac Rhythm SR  was reviewed with the receiving nurse. Opportunity for questions and clarification was provided. [id number] Assessment completed upon patients arrival to unit and care assumed. Patient oriented to unit, call light in reach.

## 2022-09-21 NOTE — PROGRESS NOTES
Pt not seen for skilled PT due to:    [ ]  Nausea/vomiting  [ ]  Eating  [ ]  Pain  [ ]  Pt lethargic  [ ]  Off Unit  Other: Attx2- Pt adamantly declined, stating she is going down for procedure and has been up already    Will f/u later as schedule allows. Thank you.   Maya Etienne, PT, DPT

## 2022-09-21 NOTE — PROGRESS NOTES
Attempted to call report to nurse receiving pt transferring to 201. Nurse stated she was in a room, cleaning up a patient. Will attempt to call again.

## 2022-09-21 NOTE — PROCEDURES
Interventional Radiology Brief Procedure Note    Patient: Tata Hope MRN: [id number]  SSN: [id number]    YOB: 1950  Age: 81 y.o. Sex: female      Date of Procedure: 9/21/2022    Procedure(s): Tunneled dialysis catheter placement under fluoroscopic guidance    Performed By: LORE Hagan     Attending: Dr. Berry Aiken    Anesthesia: Lidocaine    Estimated Blood Loss: Minimal    Specimens: None    Findings:   - Written and verbal informed consent was obtained. - Time Out was performed. - Permanent image storage performed on PACS. - Image guided right IJ double lumen TDC placement with ultrasound and fluoroscopic guidance.    - Right IJ temporary dialysis catheter removal. Hemostasis acheived  - Please refer to report on PACS for full details    Implants: Palindrome 23 cm cuff to tip dual lumen tunneled dialysis catheter    Plan: [facility] for immediate use    Signed By: LORE Hagan     September 21, 2022

## 2022-09-21 NOTE — ROUTINE PROCESS
TRANSFER - OUT REPORT:    Verbal report given to Brayan Bernardo RN on Oswaldo Rodriguez  being transferred to [address] [place] [address](unit) for routine progression of care       Report consisted of patients Situation, Background, Assessment and   Recommendations(SBAR). Information from the following report(s) SBAR, MAR, and Recent Results was reviewed with the receiving nurse. Lines:   Double Lumen right internal jugular 09/16/22 Anterior;Right Internal jugular (Active)   Central Line Being Utilized Yes 09/18/22 2012   Criteria for Appropriate Use Dialysis/apheresis 09/18/22 2012   Site Assessment Clean, dry, & intact 09/18/22 1600   Infiltration Assessment 0 09/18/22 1600   Affected Extremity/Extremities Color distal to insertion site pink (or appropriate for race) 09/18/22 1600   Date of Last Dressing Change 09/17/22 09/18/22 1600   Dressing Status Clean, dry, & intact 09/18/22 1600   Dressing Type Disk with Chlorhexadine gluconate (CHG) 09/18/22 1600   Action Taken Open ports on tubing capped 09/18/22 1600   Proximal Hub Color/Line Status Capped 09/18/22 1600   Distal Hub Color/Line Status Capped 09/18/22 1600   Alcohol Cap Used Yes 09/18/22 1600       Peripheral IV 09/11/22 Anterior;Left;Proximal Forearm (Active)   Site Assessment Clean, dry, & intact 09/19/22 2345   Phlebitis Assessment 0 09/19/22 2345   Infiltration Assessment 0 09/19/22 2345   Dressing Status Clean, dry, & intact 09/19/22 2345   Dressing Type Transparent;Tape 09/19/22 2345   Hub Color/Line Status Blue;Capped 09/19/22 2345   Action Taken Open ports on tubing capped 09/18/22 1600   Alcohol Cap Used Yes 09/18/22 1600        Opportunity for questions and clarification was provided.       Patient transported with:   [employer]

## 2022-09-21 NOTE — PROGRESS NOTES
Problem: Falls - Risk of  Goal: *Absence of Falls  Description: Document Debora Johnson Fall Risk and appropriate interventions in the flowsheet. Outcome: Progressing Towards Goal  Note: Fall Risk Interventions:  Mobility Interventions: Assess mobility with egress test, Patient to call before getting OOB         Medication Interventions: Patient to call before getting OOB, Teach patient to arise slowly    Elimination Interventions: Call light in reach, Toilet paper/wipes in reach, Patient to call for help with toileting needs              Problem: Patient Education: Go to Patient Education Activity  Goal: Patient/Family Education  Outcome: Progressing Towards Goal     Problem: Pressure Injury - Risk of  Goal: *Prevention of pressure injury  Description: Document Vladislav Scale and appropriate interventions in the flowsheet.   Outcome: Progressing Towards Goal  Note: Pressure Injury Interventions:  Sensory Interventions: Assess changes in LOC    Moisture Interventions: Absorbent underpads, Minimize layers    Activity Interventions: PT/OT evaluation, Pressure redistribution bed/mattress(bed type), Increase time out of bed    Mobility Interventions: HOB 30 degrees or less, Pressure redistribution bed/mattress (bed type), PT/OT evaluation    Nutrition Interventions: Document food/fluid/supplement intake                     Problem: Patient Education: Go to Patient Education Activity  Goal: Patient/Family Education  Outcome: Progressing Towards Goal     Problem: Patient Education: Go to Patient Education Activity  Goal: Patient/Family Education  Outcome: Progressing Towards Goal     Problem: Heart Failure: Day 1  Goal: Off Pathway (Use only if patient is Off Pathway)  Outcome: Progressing Towards Goal  Goal: Activity/Safety  Outcome: Progressing Towards Goal  Goal: Consults, if ordered  Outcome: Progressing Towards Goal  Goal: Diagnostic Test/Procedures  Outcome: Progressing Towards Goal  Goal: Nutrition/Diet  Outcome: Progressing Towards Goal  Goal: Discharge Planning  Outcome: Progressing Towards Goal  Goal: Medications  Outcome: Progressing Towards Goal  Goal: Respiratory  Outcome: Progressing Towards Goal  Goal: Treatments/Interventions/Procedures  Outcome: Progressing Towards Goal  Goal: Psychosocial  Outcome: Progressing Towards Goal  Goal: *Oxygen saturation within defined limits  Outcome: Progressing Towards Goal  Goal: *Hemodynamically stable  Outcome: Progressing Towards Goal  Goal: *Optimal pain control at patient's stated goal  Outcome: Progressing Towards Goal  Goal: *Anxiety reduced or absent  Outcome: Progressing Towards Goal     Problem: Heart Failure: Day 2  Goal: Off Pathway (Use only if patient is Off Pathway)  Outcome: Progressing Towards Goal  Goal: Activity/Safety  Outcome: Progressing Towards Goal  Goal: Consults, if ordered  Outcome: Progressing Towards Goal  Goal: Diagnostic Test/Procedures  Outcome: Progressing Towards Goal  Goal: Nutrition/Diet  Outcome: Progressing Towards Goal  Goal: Discharge Planning  Outcome: Progressing Towards Goal  Goal: Medications  Outcome: Progressing Towards Goal  Goal: Respiratory  Outcome: Progressing Towards Goal  Goal: Treatments/Interventions/Procedures  Outcome: Progressing Towards Goal  Goal: Psychosocial  Outcome: Progressing Towards Goal  Goal: *Oxygen saturation within defined limits  Outcome: Progressing Towards Goal  Goal: *Hemodynamically stable  Outcome: Progressing Towards Goal  Goal: *Optimal pain control at patient's stated goal  Outcome: Progressing Towards Goal  Goal: *Anxiety reduced or absent  Outcome: Progressing Towards Goal  Goal: *Demonstrates progressive activity  Outcome: Progressing Towards Goal     Problem: Heart Failure: Day 3  Goal: Off Pathway (Use only if patient is Off Pathway)  Outcome: Progressing Towards Goal  Goal: Activity/Safety  Outcome: Progressing Towards Goal  Goal: Diagnostic Test/Procedures  Outcome: Progressing Towards Goal  Goal: Nutrition/Diet  Outcome: Progressing Towards Goal  Goal: Discharge Planning  Outcome: Progressing Towards Goal  Goal: Medications  Outcome: Progressing Towards Goal  Goal: Respiratory  Outcome: Progressing Towards Goal  Goal: Treatments/Interventions/Procedures  Outcome: Progressing Towards Goal  Goal: Psychosocial  Outcome: Progressing Towards Goal  Goal: *Oxygen saturation within defined limits  Outcome: Progressing Towards Goal  Goal: *Hemodynamically stable  Outcome: Progressing Towards Goal  Goal: *Optimal pain control at patient's stated goal  Outcome: Progressing Towards Goal  Goal: *Anxiety reduced or absent  Outcome: Progressing Towards Goal  Goal: *Demonstrates progressive activity  Outcome: Progressing Towards Goal     Problem: Heart Failure: Day 4  Goal: Off Pathway (Use only if patient is Off Pathway)  Outcome: Progressing Towards Goal  Goal: Activity/Safety  Outcome: Progressing Towards Goal  Goal: Diagnostic Test/Procedures  Outcome: Progressing Towards Goal  Goal: Nutrition/Diet  Outcome: Progressing Towards Goal  Goal: Discharge Planning  Outcome: Progressing Towards Goal  Goal: Medications  Outcome: Progressing Towards Goal  Goal: Respiratory  Outcome: Progressing Towards Goal  Goal: Treatments/Interventions/Procedures  Outcome: Progressing Towards Goal  Goal: Psychosocial  Outcome: Progressing Towards Goal  Goal: *Oxygen saturation within defined limits  Outcome: Progressing Towards Goal  Goal: *Hemodynamically stable  Outcome: Progressing Towards Goal  Goal: *Optimal pain control at patient's stated goal  Outcome: Progressing Towards Goal  Goal: *Anxiety reduced or absent  Outcome: Progressing Towards Goal  Goal: *Demonstrates progressive activity  Outcome: Progressing Towards Goal     Problem: Heart Failure: Day 5  Goal: Off Pathway (Use only if patient is Off Pathway)  Outcome: Progressing Towards Goal  Goal: Activity/Safety  Outcome: Progressing Towards Goal  Goal: Diagnostic Test/Procedures  Outcome: Progressing Towards Goal  Goal: Nutrition/Diet  Outcome: Progressing Towards Goal  Goal: Discharge Planning  Outcome: Progressing Towards Goal  Goal: Medications  Outcome: Progressing Towards Goal  Goal: Respiratory  Outcome: Progressing Towards Goal  Goal: Treatments/Interventions/Procedures  Outcome: Progressing Towards Goal  Goal: Psychosocial  Outcome: Progressing Towards Goal

## 2022-09-21 NOTE — PROGRESS NOTES
Progress Note      68-year-old female with past medical history of hypertension, congestive heart failure, admitted for decompensated heart failure, following for renal failure  Subjective      Overnight event noted  Feels off. Some word finding difficulty  Sob on and off persists      IMPRESSION:   Acute on chronic kidney injury, rising creatinine and BUN in setting of overdiuresis  CKD stage III with proteinuria, baseline creatinine around 1.5 mg per DL, history of frequent CARA  Uremia  Decompensated heart failure, diastolic heart failure, improving symptoms  Diabetes  Secondary hyperparathyroidism of CKD   PLAN:   UO picked up a little. Cr stable without dialysis. Case can be made to take her off dialysis and keep her on diuretics for fluid control. Explained this to patient and patient is very resistant to that idea as this doesn't work due to high diuretic resistance and poor response with worsening renal numbers that makes her come back to hospital for admission. I tried to explain that we should probably maximize the diuretic dose before jumping on to dialysis and giving up on kidneys ,but she refuses to accept this approach as she feels its going to put her in a loop of admissions and readmissions. Will plan on continuing dialysis for better fluid control and re evaluate as we go. So will get [facility] today. Spoke with IR again after initially asking them to hold off on [facility]. IR is going to schedule her for [facility] today and I will look for a place for OP dialysisSpoke with nurse. Continue with NPO status for now.  Will plan on some dialysis today after TDC placement     Facility-Administered Medications: None       Current Facility-Administered Medications   Medication Dose Route Frequency    acetaminophen (TYLENOL) tablet 1,000 mg  1,000 mg Oral Q8H    ipratropium (ATROVENT) 0.02 % nebulizer solution 0.5 mg  0.5 mg Nebulization Q6H RT    insulin glargine (LANTUS) injection 15 Units  15 Units SubCUTAneous ACL 0.9% sodium chloride infusion 250 mL  250 mL IntraVENous DIALYSIS PRN    hydrALAZINE (APRESOLINE) tablet 10 mg  10 mg Oral TID    dilTIAZem ER (CARDIZEM CD) capsule 120 mg  120 mg Oral DAILY    furosemide (LASIX) tablet 40 mg  40 mg Oral DAILY    cloNIDine HCL (CATAPRES) tablet 0.1 mg  0.1 mg Oral BID    arformoteroL (BROVANA) neb solution 15 mcg  15 mcg Nebulization BID RT    budesonide (PULMICORT) 500 mcg/2 ml nebulizer suspension  500 mcg Nebulization BID RT    albuterol-ipratropium (DUO-NEB) 2.5 MG-0.5 MG/3 ML  3 mL Nebulization Q4H PRN    aspirin chewable tablet 81 mg  81 mg Oral DAILY    atorvastatin (LIPITOR) tablet 40 mg  40 mg Oral QHS    gabapentin (NEURONTIN) capsule 400 mg  400 mg Oral DAILY    sodium chloride (NS) flush 5-40 mL  5-40 mL IntraVENous Q8H    sodium chloride (NS) flush 5-40 mL  5-40 mL IntraVENous PRN    acetaminophen (TYLENOL) tablet 650 mg  650 mg Oral Q6H PRN    Or    acetaminophen (TYLENOL) suppository 650 mg  650 mg Rectal Q6H PRN    polyethylene glycol (MIRALAX) packet 17 g  17 g Oral DAILY PRN    ondansetron (ZOFRAN ODT) tablet 4 mg  4 mg Oral Q8H PRN    Or    ondansetron (ZOFRAN) injection 4 mg  4 mg IntraVENous Q6H PRN    insulin lispro (HUMALOG) injection   SubCUTAneous AC&HS    glucose chewable tablet 16 g  16 g Oral PRN    glucagon (GLUCAGEN) injection 1 mg  1 mg IntraMUSCular PRN    dextrose 10% infusion 0-250 mL  0-250 mL IntraVENous PRN    naloxone (NARCAN) injection 0.4 mg  0.4 mg IntraVENous EVERY 2 MINUTES AS NEEDED    [Held by provider] oxyCODONE-acetaminophen (PERCOCET) 5-325 mg per tablet 1 Tablet  1 Tablet Oral Q4H PRN     Facility-Administered Medications Ordered in Other Encounters   Medication Dose Route Frequency    lidocaine-EPINEPHrine (PF) (XYLOCAINE) 2 %-1:200,000 injection 400 mg  20 mL SubCUTAneous ONCE    heparin (porcine) 1,000 unit/mL injection 5,000 Units  5,000 Units IntraCATHeter ONCE       Review of Systems:   As above .   Data Review:    Labs: Results: Chemistry Recent Labs     09/21/22  0355 09/20/22  0519 09/19/22  0515   * 132* 213*    139 142   K 4.5 4.1 4.6    105 110   CO2 28 27 26   BUN 29* 22* 36*   CREA 1.53* 1.58* 1.67*   CA 9.1 8.6 8.8   AGAP 7 7 6   BUCR 19 14 22*        CBC w/Diff No results for input(s): WBC, RBC, HGB, HCT, PLT, GRANS, LYMPH, EOS, HGBEXT, HCTEXT, PLTEXT, HGBEXT, HCTEXT, PLTEXT in the last 72 hours. Coagulation No results for input(s): PTP, INR, APTT, INREXT, INREXT in the last 72 hours. Iron/Ferritin No results for input(s): IRON in the last 72 hours. No lab exists for component: TIBCCALC   BNP No results for input(s): BNPP in the last 72 hours. Cardiac Enzymes No results for input(s): CPK, CKND1, GODFREY in the last 72 hours. No lab exists for component: CKRMB, TROIP   Liver Enzymes No results for input(s): TP, ALB, TBIL, AP in the last 72 hours. No lab exists for component: SGOT, GPT, DBIL   Thyroid Studies Lab Results   Component Value Date/Time    TSH 0.21 (L) 03/19/2021 04:00 AM         EKG: normal EKG, normal sinus rhythm.     Physical Assessment:   Visit Vitals  /64 (BP 1 Location: Right upper arm, BP Patient Position: At rest)   Pulse 69   Temp 98.3 °F (36.8 °C)   Resp 18   Ht 5' 4\" (1.626 m)   Wt 110.5 kg (243 lb 9.6 oz)   SpO2 95%   BMI 41.81 kg/m²     Weight change: 1.27 kg (2 lb 12.8 oz)    Intake/Output Summary (Last 24 hours) at 9/21/2022 0943  Last data filed at 9/21/2022 0432  Gross per 24 hour   Intake 240 ml   Output 250 ml   Net -10 ml       Physical Exam:   General: comfortable, no acute distress   HEENT sclera anicteric, supple neck, no thyromegaly  CVS: S1S2 heard,  no rub  RS: + air entry b/l,   Abd: Soft, Non tender, Not distended, Positive bowel sounds, no organomegaly, no CVA / supra pubic tenderness  Neuro: non focal, awake, alert , CN II-XII are grossly intact  Extrm: mild edema, no cyanosis, clubbing   Skin: no visible  Rash  Musculoskeletal: No gross joints or bone deformities     Procedures/imaging: see electronic medical records for all procedures, Xrays and details which were not copied into this note but were reviewed prior to creation of Kaylan Vann MD  September 21, 2022  [facility]  Office [phone]

## 2022-09-22 ENCOUNTER — APPOINTMENT (OUTPATIENT)
Dept: CT IMAGING | Age: 72
DRG: 291 | End: 2022-09-22
Attending: INTERNAL MEDICINE
Payer: MEDICARE

## 2022-09-22 ENCOUNTER — HOME HEALTH ADMISSION (OUTPATIENT)
Dept: HOME HEALTH SERVICES | Facility: HOME HEALTH | Age: 72
End: 2022-09-22

## 2022-09-22 VITALS
DIASTOLIC BLOOD PRESSURE: 69 MMHG | BODY MASS INDEX: 41.59 KG/M2 | OXYGEN SATURATION: 96 % | HEART RATE: 87 BPM | HEIGHT: 64 IN | WEIGHT: 243.6 LBS | TEMPERATURE: 98.2 F | RESPIRATION RATE: 18 BRPM | SYSTOLIC BLOOD PRESSURE: 135 MMHG

## 2022-09-22 LAB
ANION GAP SERPL CALC-SCNC: 4 MMOL/L (ref 3–18)
BASOPHILS # BLD: 0 K/UL (ref 0–0.1)
BASOPHILS NFR BLD: 0 % (ref 0–2)
BUN SERPL-MCNC: 12 MG/DL (ref 7–18)
BUN/CREAT SERPL: 10 (ref 12–20)
CALCIUM SERPL-MCNC: 8.7 MG/DL (ref 8.5–10.1)
CHLORIDE SERPL-SCNC: 105 MMOL/L (ref 100–111)
CO2 SERPL-SCNC: 29 MMOL/L (ref 21–32)
CREAT SERPL-MCNC: 1.2 MG/DL (ref 0.6–1.3)
DIFFERENTIAL METHOD BLD: ABNORMAL
EOSINOPHIL # BLD: 0.1 K/UL (ref 0–0.4)
EOSINOPHIL NFR BLD: 1 % (ref 0–5)
ERYTHROCYTE [DISTWIDTH] IN BLOOD BY AUTOMATED COUNT: 17 % (ref 11.6–14.5)
GLUCOSE BLD STRIP.AUTO-MCNC: 124 MG/DL (ref 70–110)
GLUCOSE BLD STRIP.AUTO-MCNC: 145 MG/DL (ref 70–110)
GLUCOSE SERPL-MCNC: 141 MG/DL (ref 74–99)
HCT VFR BLD AUTO: 29.6 % (ref 35–45)
HGB BLD-MCNC: 8.9 G/DL (ref 12–16)
IMM GRANULOCYTES # BLD AUTO: 0.3 K/UL (ref 0–0.04)
IMM GRANULOCYTES NFR BLD AUTO: 3 % (ref 0–0.5)
LYMPHOCYTES # BLD: 2.5 K/UL (ref 0.9–3.6)
LYMPHOCYTES NFR BLD: 25 % (ref 21–52)
MAGNESIUM SERPL-MCNC: 2.1 MG/DL (ref 1.6–2.6)
MCH RBC QN AUTO: 28.3 PG (ref 24–34)
MCHC RBC AUTO-ENTMCNC: 30.1 G/DL (ref 31–37)
MCV RBC AUTO: 94.3 FL (ref 78–100)
MONOCYTES # BLD: 0.9 K/UL (ref 0.05–1.2)
MONOCYTES NFR BLD: 9 % (ref 3–10)
NEUTS SEG # BLD: 6.2 K/UL (ref 1.8–8)
NEUTS SEG NFR BLD: 63 % (ref 40–73)
NRBC # BLD: 0.07 K/UL (ref 0–0.01)
NRBC BLD-RTO: 0.7 PER 100 WBC
PLATELET # BLD AUTO: 276 K/UL (ref 135–420)
PMV BLD AUTO: 10.4 FL (ref 9.2–11.8)
POTASSIUM SERPL-SCNC: 4 MMOL/L (ref 3.5–5.5)
RBC # BLD AUTO: 3.14 M/UL (ref 4.2–5.3)
SODIUM SERPL-SCNC: 138 MMOL/L (ref 136–145)
WBC # BLD AUTO: 9.9 K/UL (ref 4.6–13.2)

## 2022-09-22 PROCEDURE — 74011000250 HC RX REV CODE- 250: Performed by: INTERNAL MEDICINE

## 2022-09-22 PROCEDURE — 94761 N-INVAS EAR/PLS OXIMETRY MLT: CPT

## 2022-09-22 PROCEDURE — 94640 AIRWAY INHALATION TREATMENT: CPT

## 2022-09-22 PROCEDURE — 74011250637 HC RX REV CODE- 250/637: Performed by: INTERNAL MEDICINE

## 2022-09-22 PROCEDURE — 70490 CT SOFT TISSUE NECK W/O DYE: CPT

## 2022-09-22 PROCEDURE — 36415 COLL VENOUS BLD VENIPUNCTURE: CPT

## 2022-09-22 PROCEDURE — 80048 BASIC METABOLIC PNL TOTAL CA: CPT

## 2022-09-22 PROCEDURE — 74011636637 HC RX REV CODE- 636/637: Performed by: HOSPITALIST

## 2022-09-22 PROCEDURE — 99239 HOSP IP/OBS DSCHRG MGMT >30: CPT | Performed by: EMERGENCY MEDICINE

## 2022-09-22 PROCEDURE — 74011250637 HC RX REV CODE- 250/637: Performed by: PHYSICIAN ASSISTANT

## 2022-09-22 PROCEDURE — 74011250637 HC RX REV CODE- 250/637: Performed by: EMERGENCY MEDICINE

## 2022-09-22 PROCEDURE — 85025 COMPLETE CBC W/AUTO DIFF WBC: CPT

## 2022-09-22 PROCEDURE — 82962 GLUCOSE BLOOD TEST: CPT

## 2022-09-22 PROCEDURE — 97116 GAIT TRAINING THERAPY: CPT

## 2022-09-22 PROCEDURE — 83735 ASSAY OF MAGNESIUM: CPT

## 2022-09-22 RX ORDER — BUMETANIDE 1 MG/1
2 TABLET ORAL 3 TIMES DAILY
Status: DISCONTINUED | OUTPATIENT
Start: 2022-09-22 | End: 2022-09-22

## 2022-09-22 RX ORDER — BUMETANIDE 2 MG/1
2 TABLET ORAL 2 TIMES DAILY
Qty: 60 TABLET | Refills: 0 | Status: SHIPPED | OUTPATIENT
Start: 2022-09-22

## 2022-09-22 RX ORDER — BISACODYL 5 MG
5 TABLET, DELAYED RELEASE (ENTERIC COATED) ORAL DAILY PRN
Status: DISCONTINUED | OUTPATIENT
Start: 2022-09-22 | End: 2022-09-22 | Stop reason: HOSPADM

## 2022-09-22 RX ORDER — BUMETANIDE 1 MG/1
2 TABLET ORAL 2 TIMES DAILY
Status: DISCONTINUED | OUTPATIENT
Start: 2022-09-22 | End: 2022-09-22 | Stop reason: HOSPADM

## 2022-09-22 RX ORDER — CLONIDINE HYDROCHLORIDE 0.1 MG/1
0.1 TABLET ORAL 2 TIMES DAILY
Qty: 60 TABLET | Refills: 0 | Status: SHIPPED | OUTPATIENT
Start: 2022-09-22

## 2022-09-22 RX ORDER — FACIAL-BODY WIPES
10 EACH TOPICAL DAILY PRN
Status: DISCONTINUED | OUTPATIENT
Start: 2022-09-22 | End: 2022-09-22

## 2022-09-22 RX ADMIN — ACETAMINOPHEN 1000 MG: 500 TABLET ORAL at 06:25

## 2022-09-22 RX ADMIN — BUDESONIDE 500 MCG: 0.5 SUSPENSION RESPIRATORY (INHALATION) at 07:38

## 2022-09-22 RX ADMIN — HYDRALAZINE HYDROCHLORIDE 10 MG: 10 TABLET, FILM COATED ORAL at 09:13

## 2022-09-22 RX ADMIN — ARFORMOTEROL TARTRATE 15 MCG: 15 SOLUTION RESPIRATORY (INHALATION) at 07:38

## 2022-09-22 RX ADMIN — FUROSEMIDE 40 MG: 40 TABLET ORAL at 09:13

## 2022-09-22 RX ADMIN — Medication 15 UNITS: at 12:04

## 2022-09-22 RX ADMIN — BISACODYL 5 MG: 5 TABLET, COATED ORAL at 12:31

## 2022-09-22 RX ADMIN — GABAPENTIN 400 MG: 400 CAPSULE ORAL at 09:13

## 2022-09-22 RX ADMIN — DILTIAZEM HYDROCHLORIDE 120 MG: 120 CAPSULE, COATED, EXTENDED RELEASE ORAL at 09:13

## 2022-09-22 RX ADMIN — IPRATROPIUM BROMIDE 0.5 MG: 0.5 SOLUTION RESPIRATORY (INHALATION) at 07:40

## 2022-09-22 RX ADMIN — ASPIRIN 81 MG CHEWABLE TABLET 81 MG: 81 TABLET CHEWABLE at 09:13

## 2022-09-22 RX ADMIN — CLONIDINE HYDROCHLORIDE 0.1 MG: 0.1 TABLET ORAL at 09:13

## 2022-09-22 RX ADMIN — BUMETANIDE 2 MG: 1 TABLET ORAL at 12:03

## 2022-09-22 RX ADMIN — SODIUM CHLORIDE, PRESERVATIVE FREE 10 ML: 5 INJECTION INTRAVENOUS at 06:25

## 2022-09-22 NOTE — HOME CARE
Spoke with patient. Explained home care orders received. She declines any homecare at this time. States \" I will take care of myself\". Left voicemail for BACILIO Lu RN, BSN  [employer]

## 2022-09-22 NOTE — PROGRESS NOTES
Patient returned from dialysis via bed accompanied by transport. Patient alert and oriented, c/o pain and tenderness at cath site, right subclavian site. Bruising and slight swelling around site. VS recorded and medications resumed, Dr Nitza Diggs called with patient's c/o pain, ordered pain med. Patient provided with HS snack.

## 2022-09-22 NOTE — PROGRESS NOTES
Progress Note      74-year-old female with past medical history of hypertension, congestive heart failure, admitted for decompensated heart failure, following for renal failure  Subjective      Overnight event noted  Feels off. Some word finding difficulty--chronic issue? CT head negative  Sob on and off persists      IMPRESSION:   Acute on chronic kidney injury, rising creatinine and BUN in setting of overdiuresis  CKD stage III with proteinuria, baseline creatinine around 1.5 mg per DL, history of frequent CARA  Uremia  Decompensated heart failure, diastolic heart failure, improving symptoms  Diabetes  Secondary hyperparathyroidism of CKD   PLAN:   Ok for discharge from  my standpoint  Her BP trending very well on HD. Doesn't need all the antihypertensives currently. Will just keep her on clonidine 0.1 mg po bid and d/c rest of them  She has enough RRF and her provisional diagnosis is still CARA and not ESRD. Will keep her on  bumex 2 mg po bid for now on discharge and maximize diuresis  She has a chair time of [facility] 10:15 am TTS starting on 9/27. It is ok  to discharge her as her current diuretic regimen will hold her and her RRF in my opinion is good enough to hold her.      Facility-Administered Medications: None       Current Facility-Administered Medications   Medication Dose Route Frequency    bumetanide (BUMEX) tablet 2 mg  2 mg Oral BID    acetaminophen (TYLENOL) tablet 1,000 mg  1,000 mg Oral Q8H    ipratropium (ATROVENT) 0.02 % nebulizer solution 0.5 mg  0.5 mg Nebulization Q6H RT    insulin glargine (LANTUS) injection 15 Units  15 Units SubCUTAneous ACL    0.9% sodium chloride infusion 250 mL  250 mL IntraVENous DIALYSIS PRN    cloNIDine HCL (CATAPRES) tablet 0.1 mg  0.1 mg Oral BID    arformoteroL (BROVANA) neb solution 15 mcg  15 mcg Nebulization BID RT    budesonide (PULMICORT) 500 mcg/2 ml nebulizer suspension  500 mcg Nebulization BID RT    albuterol-ipratropium (DUO-NEB) 2.5 MG-0.5 MG/3 ML 3 mL Nebulization Q4H PRN    aspirin chewable tablet 81 mg  81 mg Oral DAILY    atorvastatin (LIPITOR) tablet 40 mg  40 mg Oral QHS    gabapentin (NEURONTIN) capsule 400 mg  400 mg Oral DAILY    sodium chloride (NS) flush 5-40 mL  5-40 mL IntraVENous Q8H    sodium chloride (NS) flush 5-40 mL  5-40 mL IntraVENous PRN    acetaminophen (TYLENOL) tablet 650 mg  650 mg Oral Q6H PRN    Or    acetaminophen (TYLENOL) suppository 650 mg  650 mg Rectal Q6H PRN    polyethylene glycol (MIRALAX) packet 17 g  17 g Oral DAILY PRN    ondansetron (ZOFRAN ODT) tablet 4 mg  4 mg Oral Q8H PRN    Or    ondansetron (ZOFRAN) injection 4 mg  4 mg IntraVENous Q6H PRN    insulin lispro (HUMALOG) injection   SubCUTAneous AC&HS    glucose chewable tablet 16 g  16 g Oral PRN    glucagon (GLUCAGEN) injection 1 mg  1 mg IntraMUSCular PRN    dextrose 10% infusion 0-250 mL  0-250 mL IntraVENous PRN    naloxone (NARCAN) injection 0.4 mg  0.4 mg IntraVENous EVERY 2 MINUTES AS NEEDED    [Held by provider] oxyCODONE-acetaminophen (PERCOCET) 5-325 mg per tablet 1 Tablet  1 Tablet Oral Q4H PRN       Review of Systems:   As above . Data Review:    Labs: Results:       Chemistry Recent Labs     09/22/22  0420 09/21/22  0355 09/20/22  0519   * 124* 132*    142 139   K 4.0 4.5 4.1    107 105   CO2 29 28 27   BUN 12 29* 22*   CREA 1.20 1.53* 1.58*   CA 8.7 9.1 8.6   AGAP 4 7 7   BUCR 10* 19 14        CBC w/Diff Recent Labs     09/22/22  0420   WBC 9.9   RBC 3.14*   HGB 8.9*   HCT 29.6*      GRANS 63   LYMPH 25   EOS 1          Coagulation No results for input(s): PTP, INR, APTT, INREXT, INREXT in the last 72 hours. Iron/Ferritin No results for input(s): IRON in the last 72 hours. No lab exists for component: TIBCCALC   BNP No results for input(s): BNPP in the last 72 hours. Cardiac Enzymes No results for input(s): CPK, CKND1, GODFREY in the last 72 hours.     No lab exists for component: CKRMB, TROIP   Liver Enzymes No results for input(s): TP, ALB, TBIL, AP in the last 72 hours. No lab exists for component: SGOT, GPT, DBIL   Thyroid Studies Lab Results   Component Value Date/Time    TSH 0.21 (L) 03/19/2021 04:00 AM         EKG: normal EKG, normal sinus rhythm.     Physical Assessment:   Visit Vitals  /70 (BP 1 Location: Right upper arm, BP Patient Position: At rest)   Pulse 67   Temp 98.2 °F (36.8 °C)   Resp 20   Ht 5' 4\" (1.626 m)   Wt 110.5 kg (243 lb 9.6 oz)   SpO2 98%   BMI 41.81 kg/m²     Weight change:     Intake/Output Summary (Last 24 hours) at 9/22/2022 0930  Last data filed at 9/22/2022 0200  Gross per 24 hour   Intake 240 ml   Output 2000 ml   Net -1760 ml       Physical Exam:   General: comfortable, no acute distress   HEENT sclera anicteric, supple neck, no thyromegaly  CVS: S1S2 heard,  no rub  RS: + air entry b/l,   Abd: Soft, Non tender, Not distended, Positive bowel sounds, no organomegaly, no CVA / supra pubic tenderness  Neuro: non focal, awake, alert , CN II-XII are grossly intact  Extrm: mild edema, no cyanosis, clubbing   Skin: no visible  Rash  Musculoskeletal: No gross joints or bone deformities     Procedures/imaging: see electronic medical records for all procedures, Xrays and details which were not copied into this note but were reviewed prior to creation of Sherren Dolores, MD  September 22, 2022  [facility]  Office [phone]

## 2022-09-22 NOTE — ROUTINE PROCESS
Pulse oximetry assessment   97% at rest on room air (if 88% or less, skip next steps)  94% while ambulating on room air.  Patient does not need any oxygen for home

## 2022-09-22 NOTE — PROGRESS NOTES
Problem: Falls - Risk of  Goal: *Absence of Falls  Description: Document Alek Luca Fall Risk and appropriate interventions in the flowsheet. Outcome: Progressing Towards Goal  Note: Fall Risk Interventions:  Mobility Interventions: Bed/chair exit alarm, Patient to call before getting OOB, Strengthening exercises (ROM-active/passive)         Medication Interventions: Assess postural VS orthostatic hypotension, Bed/chair exit alarm, Patient to call before getting OOB, Teach patient to arise slowly    Elimination Interventions: Bed/chair exit alarm, Call light in reach, Patient to call for help with toileting needs, Toilet paper/wipes in reach, Toileting schedule/hourly rounds              Problem: Pressure Injury - Risk of  Goal: *Prevention of pressure injury  Description: Document Vladislav Scale and appropriate interventions in the flowsheet.   Outcome: Progressing Towards Goal  Note: Pressure Injury Interventions:  Sensory Interventions: Assess changes in LOC, Keep linens dry and wrinkle-free, Maintain/enhance activity level, Pressure redistribution bed/mattress (bed type)    Moisture Interventions: Absorbent underpads, Maintain skin hydration (lotion/cream), Offer toileting Q_hr    Activity Interventions: Increase time out of bed, Pressure redistribution bed/mattress(bed type)    Mobility Interventions: HOB 30 degrees or less, Pressure redistribution bed/mattress (bed type)    Nutrition Interventions: Document food/fluid/supplement intake

## 2022-09-22 NOTE — PROGRESS NOTES
Problem: Mobility Impaired (Adult and Pediatric)  Goal: *Acute Goals and Plan of Care (Insert Text)  Description: Physical Therapy Goals  Initiated 9/13/2022 and to be accomplished within 7 day(s)  1. Patient will move from supine to sit and sit to supine  in bed with modified independence. 2.  Patient will transfer from bed to chair and chair to bed with modified independence using the least restrictive device. 3.  Patient will perform sit to stand with modified independence. 4.  Patient will ambulate with modified independence for 100 feet with the least restrictive device. 5.  Patient will ascend/descend 4 stairs with  handrail(s) with supervision/set-up. PLOF: Patient lives with spouse in single story home with 4 JUSTICE. She was independent without AD. She has rollator at home. Outcome: Progressing Towards Goal  PHYSICAL THERAPY TREATMENT    Patient: Scot Ray (24 y.o. female)  Date: 9/22/2022  Diagnosis: Acute respiratory failure with hypoxia ([facility]) [J96.01]  CHF (congestive heart failure) ([facility] [address] [I50.9] <principal problem not specified>      Precautions: Fall  PLOF: see above    ASSESSMENT:  Pt in bed and agreeable to PT session. Pt transferred supine to sit mod I.  Pt demonstrated good static sitting balance. Pt stood from the bed mod I and ambulated 140 feet with RW and SBA with several short standing rest breaks. Pt was on room air while ambulating and O2 sat remained above 94%. Pt was left sitting edge of bed with needs in reach at the end of the session. Progression toward goals:   [x]      Improving appropriately and progressing toward goals  []      Improving slowly and progressing toward goals  []      Not making progress toward goals and plan of care will be adjusted     PLAN:  Patient continues to benefit from skilled intervention to address the above impairments. Continue treatment per established plan of care.     Further Equipment Recommendations for Discharge:  N/A- pt has rollator    AMPAC: Based on an AM-PAC score of 18/20  omitting stairs and their current functional mobility deficits, it is recommended that the patient have 2-3 sessions per week of Physical Therapy at d/c to increase the patient's independence. This AMPAC score should be considered in conjunction with interdisciplinary team recommendations to determine the most appropriate discharge setting. Patient's social support, diagnosis, medical stability, and prior level of function should also be taken into consideration. SUBJECTIVE:   Patient stated I'm doing better, there has been a lot going on.    OBJECTIVE DATA SUMMARY:   Critical Behavior:  Calm and cooperative  Functional Mobility Training:  Bed Mobility:  Rolling: Independent  Supine to Sit: Modified independent  Sit to Supine: Modified independent      Transfers:  Sit to Stand: Modified independent  Stand to Sit: Modified independent  Balance:  Sitting: Intact  Standing: With support  Standing - Static: Good  Standing - Dynamic :  (fair+)   Ambulation/Gait Training:  Distance (ft): 140 Feet (ft)  Assistive Device: Walker, rolling  Ambulation - Level of Assistance: Stand-by assistance  Gait Abnormalities: Decreased step clearance  Pain:  Pain level pre-treatment: 0/10  Pain level post-treatment: 0/10   Pain Intervention(s): n/a    Activity Tolerance:   Fair-  Please refer to the flowsheet for vital signs taken during this treatment. After treatment:   [] Patient left in no apparent distress sitting up in chair  [x] Patient left in no apparent distress sitting edge of bed  [x] Call bell left within reach  [x] Nursing notified  [] Caregiver present  [] Bed alarm activated  [] SCDs applied      COMMUNICATION/EDUCATION:   [x]         Role of Physical Therapy in the acute care setting. [x]         Fall prevention education was provided and the patient/caregiver indicated understanding.   [x]         Patient/family have participated as able in working toward goals and plan of care. [x]         Patient/family agree to work toward stated goals and plan of care. []         Patient understands intent and goals of therapy, but is neutral about his/her participation. []         Patient is unable to participate in stated goals/plan of care: ongoing with therapy staff.  []         Other:        Rohith Sewell, PT   Time Calculation: 10 mins    [employer] AM-PAC® Basic Mobility Inpatient Short Form (6-Clicks) Version 2    How much HELP from another person does the patient currently need    (If the patient hasn't done an activity recently, how much help from another person do you think he/she would need if he/she tried?)   Total (Total A or Dep)   A Lot  (Mod to Max A)   A Little (Sup or Min A)   None (Mod I to I)   Turning from your back to your side while in a flat bed without using bedrails? [] 1 [] 2 [] 3 [x] 4   2. Moving from lying on your back to sitting on the side of a flat bed without using bedrails? [] 1 [] 2 [] 3 [x] 4   3. Moving to and from a bed to a chair (including a wheelchair)? [] 1 [] 2 [x] 3 [] 4   4. Standing up from a chair using your arms (e.g., wheelchair, or bedside chair)? [] 1 [] 2 [] 3 [x] 4   5. Walking in hospital room? [] 1 [] 2 [x] 3 [] 4   6. Climbing 3-5 steps with a railing?+   [] 1 [] 2 [] 3 [] 4   +If stair climbing cannot be assessed, skip item #6. Sum responses from items 1-5. Based on an AM-PAC score of 18/20  omitting stairs and their current functional mobility deficits, it is recommended that the patient have 2-3 sessions per week of Physical Therapy at d/c to increase the patient's independence.

## 2022-09-22 NOTE — ROUTINE PROCESS
Bedside shift change report given to Peyton RN (oncoming nurse) by Chaz Gee RN (offgoing nurse). Report included the following information SBAR, Kardex, MAR, Recent Results, and Cardiac Rhythm SR . Patient asleep on rounds, NPO. Call light in reach, patient encouraged to call for assistance as needed.

## 2022-09-22 NOTE — ROUTINE PROCESS
Patient complained of constipation and pain. Dr Renata Pandya was made aware at 56. Received order for dulcolax 10 mg po PRN. Dr. Renata Pandya stated he will address patient pain when he round on patient.  Will continue to monitor

## 2022-10-08 ENCOUNTER — HOSPITAL ENCOUNTER (EMERGENCY)
Age: 72
Discharge: HOME | End: 2022-10-08
Attending: EMERGENCY MEDICINE
Payer: MEDICARE

## 2022-10-08 ENCOUNTER — APPOINTMENT (OUTPATIENT)
Dept: GENERAL RADIOLOGY | Age: 72
End: 2022-10-08
Attending: EMERGENCY MEDICINE
Payer: MEDICARE

## 2022-10-08 VITALS
HEART RATE: 96 BPM | BODY MASS INDEX: 41.59 KG/M2 | OXYGEN SATURATION: 94 % | HEIGHT: 64 IN | RESPIRATION RATE: 15 BRPM | WEIGHT: 243.6 LBS | SYSTOLIC BLOOD PRESSURE: 112 MMHG | DIASTOLIC BLOOD PRESSURE: 45 MMHG | TEMPERATURE: 98.7 F

## 2022-10-08 DIAGNOSIS — I47.1 PAROXYSMAL SVT (SUPRAVENTRICULAR TACHYCARDIA) (HCC): Primary | ICD-10-CM

## 2022-10-08 DIAGNOSIS — N18.9 ACUTE RENAL FAILURE SUPERIMPOSED ON CHRONIC KIDNEY DISEASE, ON CHRONIC DIALYSIS, UNSPECIFIED ACUTE RENAL FAILURE TYPE (HCC): ICD-10-CM

## 2022-10-08 DIAGNOSIS — E66.9 OBESITY, UNSPECIFIED CLASSIFICATION, UNSPECIFIED OBESITY TYPE, UNSPECIFIED WHETHER SERIOUS COMORBIDITY PRESENT: ICD-10-CM

## 2022-10-08 DIAGNOSIS — R00.0 SINUS TACHYCARDIA: ICD-10-CM

## 2022-10-08 DIAGNOSIS — N17.9 ACUTE RENAL FAILURE SUPERIMPOSED ON CHRONIC KIDNEY DISEASE, ON CHRONIC DIALYSIS, UNSPECIFIED ACUTE RENAL FAILURE TYPE (HCC): ICD-10-CM

## 2022-10-08 DIAGNOSIS — Z99.2 ACUTE RENAL FAILURE SUPERIMPOSED ON CHRONIC KIDNEY DISEASE, ON CHRONIC DIALYSIS, UNSPECIFIED ACUTE RENAL FAILURE TYPE (HCC): ICD-10-CM

## 2022-10-08 LAB
ALBUMIN SERPL-MCNC: 3.6 G/DL (ref 3.4–5)
ALBUMIN/GLOB SERPL: 1.3 {RATIO} (ref 0.8–1.7)
ALP SERPL-CCNC: 126 U/L (ref 45–117)
ALT SERPL-CCNC: 24 U/L (ref 13–56)
ANION GAP SERPL CALC-SCNC: 12 MMOL/L (ref 3–18)
AST SERPL-CCNC: 24 U/L (ref 10–38)
BASOPHILS # BLD: 0 K/UL (ref 0–0.1)
BASOPHILS NFR BLD: 0 % (ref 0–2)
BILIRUB SERPL-MCNC: 0.4 MG/DL (ref 0.2–1)
BNP SERPL-MCNC: 1057 PG/ML (ref 0–900)
BUN SERPL-MCNC: 11 MG/DL (ref 7–18)
BUN/CREAT SERPL: 6 (ref 12–20)
CALCIUM SERPL-MCNC: 8.9 MG/DL (ref 8.5–10.1)
CHLORIDE SERPL-SCNC: 99 MMOL/L (ref 100–111)
CO2 SERPL-SCNC: 29 MMOL/L (ref 21–32)
CREAT SERPL-MCNC: 1.99 MG/DL (ref 0.6–1.3)
DIFFERENTIAL METHOD BLD: ABNORMAL
EOSINOPHIL # BLD: 0.1 K/UL (ref 0–0.4)
EOSINOPHIL NFR BLD: 1 % (ref 0–5)
ERYTHROCYTE [DISTWIDTH] IN BLOOD BY AUTOMATED COUNT: 16.4 % (ref 11.6–14.5)
GLOBULIN SER CALC-MCNC: 2.8 G/DL (ref 2–4)
GLUCOSE SERPL-MCNC: 171 MG/DL (ref 74–99)
HCT VFR BLD AUTO: 30 % (ref 35–45)
HGB BLD-MCNC: 9.4 G/DL (ref 12–16)
IMM GRANULOCYTES # BLD AUTO: 0.1 K/UL (ref 0–0.04)
IMM GRANULOCYTES NFR BLD AUTO: 1 % (ref 0–0.5)
LYMPHOCYTES # BLD: 1.9 K/UL (ref 0.9–3.6)
LYMPHOCYTES NFR BLD: 25 % (ref 21–52)
MAGNESIUM SERPL-MCNC: 1.9 MG/DL (ref 1.6–2.6)
MCH RBC QN AUTO: 28.9 PG (ref 24–34)
MCHC RBC AUTO-ENTMCNC: 31.3 G/DL (ref 31–37)
MCV RBC AUTO: 92.3 FL (ref 78–100)
MONOCYTES # BLD: 0.8 K/UL (ref 0.05–1.2)
MONOCYTES NFR BLD: 11 % (ref 3–10)
NEUTS SEG # BLD: 4.5 K/UL (ref 1.8–8)
NEUTS SEG NFR BLD: 62 % (ref 40–73)
NRBC # BLD: 0.24 K/UL (ref 0–0.01)
NRBC BLD-RTO: 3.3 PER 100 WBC
PLATELET # BLD AUTO: 368 K/UL (ref 135–420)
PLATELET COMMENTS,PCOM: ABNORMAL
PMV BLD AUTO: 9.8 FL (ref 9.2–11.8)
POTASSIUM SERPL-SCNC: 3.4 MMOL/L (ref 3.5–5.5)
PROT SERPL-MCNC: 6.4 G/DL (ref 6.4–8.2)
RBC # BLD AUTO: 3.25 M/UL (ref 4.2–5.3)
RBC MORPH BLD: ABNORMAL
SODIUM SERPL-SCNC: 140 MMOL/L (ref 136–145)
T4 FREE SERPL-MCNC: 1.5 NG/DL (ref 0.7–1.5)
TROPONIN-HIGH SENSITIVITY: 46 NG/L (ref 0–54)
TSH SERPL DL<=0.05 MIU/L-ACNC: 1.29 UIU/ML (ref 0.36–3.74)
WBC # BLD AUTO: 7.4 K/UL (ref 4.6–13.2)

## 2022-10-08 PROCEDURE — 83735 ASSAY OF MAGNESIUM: CPT

## 2022-10-08 PROCEDURE — 80053 COMPREHEN METABOLIC PANEL: CPT

## 2022-10-08 PROCEDURE — 74011250636 HC RX REV CODE- 250/636: Performed by: EMERGENCY MEDICINE

## 2022-10-08 PROCEDURE — 99285 EMERGENCY DEPT VISIT HI MDM: CPT

## 2022-10-08 PROCEDURE — 93005 ELECTROCARDIOGRAM TRACING: CPT

## 2022-10-08 PROCEDURE — 85025 COMPLETE CBC W/AUTO DIFF WBC: CPT

## 2022-10-08 PROCEDURE — 96360 HYDRATION IV INFUSION INIT: CPT

## 2022-10-08 PROCEDURE — 74011250637 HC RX REV CODE- 250/637: Performed by: EMERGENCY MEDICINE

## 2022-10-08 PROCEDURE — 84443 ASSAY THYROID STIM HORMONE: CPT

## 2022-10-08 PROCEDURE — 84484 ASSAY OF TROPONIN QUANT: CPT

## 2022-10-08 PROCEDURE — 84439 ASSAY OF FREE THYROXINE: CPT

## 2022-10-08 PROCEDURE — 71045 X-RAY EXAM CHEST 1 VIEW: CPT

## 2022-10-08 PROCEDURE — 83880 ASSAY OF NATRIURETIC PEPTIDE: CPT

## 2022-10-08 RX ORDER — METOPROLOL TARTRATE 25 MG/1
25 TABLET, FILM COATED ORAL 2 TIMES DAILY
Qty: 60 TABLET | Refills: 0 | Status: SHIPPED | OUTPATIENT
Start: 2022-10-08

## 2022-10-08 RX ORDER — METOPROLOL TARTRATE 25 MG/1
12.5 TABLET, FILM COATED ORAL ONCE
Status: COMPLETED | OUTPATIENT
Start: 2022-10-08 | End: 2022-10-08

## 2022-10-08 RX ORDER — ACETAMINOPHEN 500 MG
1000 TABLET ORAL ONCE
Status: COMPLETED | OUTPATIENT
Start: 2022-10-08 | End: 2022-10-08

## 2022-10-08 RX ORDER — METOPROLOL TARTRATE 25 MG/1
25 TABLET, FILM COATED ORAL ONCE
Status: COMPLETED | OUTPATIENT
Start: 2022-10-08 | End: 2022-10-08

## 2022-10-08 RX ADMIN — ACETAMINOPHEN 1000 MG: 500 TABLET ORAL at 19:09

## 2022-10-08 RX ADMIN — SODIUM CHLORIDE 250 ML: 9 INJECTION, SOLUTION INTRAVENOUS at 17:55

## 2022-10-08 RX ADMIN — METOPROLOL TARTRATE 25 MG: 25 TABLET, FILM COATED ORAL at 20:08

## 2022-10-08 RX ADMIN — METOPROLOL TARTRATE 12.5 MG: 25 TABLET, FILM COATED ORAL at 21:16

## 2022-10-08 NOTE — ED TRIAGE NOTES
80 yo F to ED for SVT. Pt was at home after dialysis and noted her heart was racing. SVT at rate 0f 190's. Given 6mg adenosine pta and converted to ST. Given 250 ns bolus by EMS.  Pt alert and is complaining of pain all over from fall on Tuesday

## 2022-10-08 NOTE — ED NOTES
Pt made aware of pending UA. She reports that she has been having feelings of urgency without urination.  I told her we will try after fluid bolus

## 2022-10-08 NOTE — ED NOTES
Pt reported she didn't think she could get up to bedside commode or restroom. She also expressed that she cannot go on bedpan. Declined straight cath for urine.  PT cleaned and purewick placed at this time

## 2022-10-08 NOTE — ED PROVIDER NOTES
EMERGENCY DEPARTMENT HISTORY AND PHYSICAL EXAM      Date: 10/8/2022  Patient Name: Sanket Rebollar      History of Presenting Illness     Chief Complaint   Patient presents with    Rapid Heart Rate       Location/Duration/Severity/Modifying factors   Chief Complaint   Patient presents with    Rapid Heart Rate       HPI:  Sanket Rebollar is a 81 y.o. female with history as listed presents to the Emergency Department via EMS from home for elevated heart rate. Patient has a history of COPD, congestive heart failure, chronic kidney disease recently with acute kidney injury. Patient was admitted to the hospital for hypertensive emergency and fluid overload back in September. During her ED and hospital course, she was diuresed and required initiation of dialysis due to persistent kidney injury attributed to medications as well as possible overdiuresis. Her medications were adjusted, and all blood pressure medications were discontinued except for clonidine. She does still make urine, but urinates infrequently, usually once or twice a day. Has not urinated yet today. She did have dialysis today. She states that this morning, she woke up and was not feeling well. Reports feeling lightheaded and dizzy. She felt like her heart was beating fast.  She did go to dialysis, and was told that her heart rate was in the 120s to 130s. She was placed on oxygen and that improved her symptoms. She underwent dialysis, unsure how much fluid was taken off. After coming home, she felt her heart was racing more. She called EMS, and was found to have a heart rate in the 190s, with EKG consistent with SVT. Vagal maneuvers failed, but she was given 6 of adenosine which converted her into sinus tachycardia in the 130s. She denies any chest pain during any of this. Denies any shortness of breath, but admits to chronic shortness of breath which is unchanged.   She complains of some lightheadedness during her symptoms as well, which has improved somewhat. She denies any fevers, chills, nausea, or vomiting. No abdominal pain. There are no other complaints, modifying factors, or associated symptoms. PCP: Steven Sahni MD    Current Outpatient Medications   Medication Sig Dispense Refill    metoprolol tartrate (LOPRESSOR) 25 mg tablet Take 1 Tablet by mouth two (2) times a day. 60 Tablet 0    bumetanide (BUMEX) 2 mg tablet Take 1 Tablet by mouth two (2) times a day. 60 Tablet 0    cloNIDine HCL (CATAPRES) 0.1 mg tablet Take 1 Tablet by mouth two (2) times a day. 60 Tablet 0    insulin detemir U-100 (Levemir U-100 Insulin) 100 unit/mL injection 15 units daily for diabetes once daily with lunch 2 mL 0    olodateroL (STRIVERDI) 2.5 mcg/actuation mist Take 2 Puffs by inhalation in the morning. 4 g 0    gabapentin (NEURONTIN) 400 mg capsule Take 400 mg by mouth in the morning. albuterol-ipratropium (DUO-NEB) 2.5 mg-0.5 mg/3 ml nebu 3 mL by Nebulization route every six (6) hours as needed for Wheezing. 30 Nebule 0    atorvastatin (LIPITOR) 80 mg tablet Take 1 Tab by mouth daily. 90 Tab 3    aspirin 81 mg chewable tablet Take 2 Tabs by mouth daily. 60 Tab 0    umeclidinium-vilanterol (ANORO ELLIPTA) 62.5-25 mcg/actuation inhaler Take 1 Puff by inhalation daily. For COPD 3 Inhaler 3       Past History     Past Medical History:  Past Medical History:   Diagnosis Date    Abnormal WBC count 5/17/2016    Anemia     Bilateral shoulder pain 9/27/2016    Chondromalacia of both patellae     COPD (chronic obstructive pulmonary disease) ([facility] [address] 9/2015    mild-mod dz; Dr Moy Delgado    Diabetes [facility]) 2013    Diabetic eye exam ([facility] [address] 2016    Dilated cardiomyopathy ([facility] [address]     Dyslipidemia     Gout     Heart attack ([facility] [address] 10/18/2019    History of echocardiogram 11/14/2014    Mild LVE. EF 40%. Mild, diffuse hypk. Mild LAE. Mild MR.       Hypercholesteremia 1/08/14    Hypertension 2000    Noncompliance with medications 2/16/2016    Obesity     Orthostatic hypotension 5/17/2016    Osteoarthritis of both knees     Pain management 04/01/2016    Dr. Evan Serna     Popliteal cyst, bilateral      Stage 3b chronic kidney disease ([facility] [address] 8/8/2022    Vitamin D deficiency 10/16/14       Past Surgical History:  Past Surgical History:   Procedure Laterality Date    HX HEART CATHETERIZATION      HX TUBAL LIGATION      IR INSERT TUNL CVC W/O PORT OVER 5 YR  9/21/2022       Family History:  Family History   Problem Relation Age of Onset    Diabetes Mother     Hypertension Mother     Hypertension Father     Kidney Disease Maternal Aunt 48        Dialysis       Social History:  Social History     Tobacco Use    Smoking status: Former     Packs/day: 1.00     Years: 52.00     Pack years: 52.00     Types: Cigarettes     Quit date: 5/20/2019     Years since quitting: 3.4    Smokeless tobacco: Never   Substance Use Topics    Alcohol use: No     Alcohol/week: 0.0 standard drinks    Drug use: No       Allergies:  No Known Allergies      Review of Systems     Review of Systems   All other systems reviewed and are negative. Physical Exam     Physical Exam  Vitals and nursing note reviewed. Constitutional:       General: She is not in acute distress. Appearance: Normal appearance. She is obese. She is not ill-appearing or toxic-appearing. HENT:      Head: Normocephalic and atraumatic. Right Ear: External ear normal.      Left Ear: External ear normal.      Nose: Nose normal.      Mouth/Throat:      Mouth: Mucous membranes are moist.      Pharynx: Oropharynx is clear. Eyes:      Extraocular Movements: Extraocular movements intact. Pupils: Pupils are equal, round, and reactive to light. Cardiovascular:      Rate and Rhythm: Regular rhythm. Tachycardia present. Pulses: Normal pulses. Heart sounds: Normal heart sounds. No murmur heard. No friction rub. No gallop. Pulmonary:      Effort: Pulmonary effort is normal. No respiratory distress.       Breath sounds: Normal breath sounds. No stridor. No wheezing, rhonchi or rales. Comments: Mild conversational dyspnea,  states this is her baseline. Breath sounds mildly decreased without overt wheezes, rales, or rhonchi. Abdominal:      General: There is no distension. Palpations: Abdomen is soft. Tenderness: There is no abdominal tenderness. There is no guarding or rebound. Musculoskeletal:         General: No signs of injury. Normal range of motion. Cervical back: Normal range of motion and neck supple. Skin:     General: Skin is warm and dry. Capillary Refill: Capillary refill takes less than 2 seconds. Neurological:      General: No focal deficit present. Mental Status: She is alert and oriented to person, place, and time. Psychiatric:         Mood and Affect: Mood normal.         Behavior: Behavior normal.       Lab and Diagnostic Study Results     Labs -  No results found for this or any previous visit (from the past 24 hour(s)). Radiologic Studies -   XR CHEST PORT   Final Result   Mild interstitial prominence may reflect mild edema or atypical   infectious/inflammatory etiology. EKG by my interpretation demonstrates sinus tachycardia at a ventricular rate of going of 34 bpm.  Normal axis, normal intervals. No acute ST elevations or T wave inversions. LVH with repolarization abnormality noted. Compared with prior EKG from September 9, 2022, ventricular rate has increased by 35 bpm.    Procedures and Critical Care       Performed by: Betsy Lobo MD    Procedures         Betsy Lobo MD    Medical Decision Making and ED Course   -I initiated care for this patient in order to expedite appropriate management. However, care will be turned over to Dr. Suszanne Hashimoto pending reassessment, interpretation of test results, and further management as indicated.     - I reviewed the vital signs, available nursing notes, past medical history, past surgical history, family history and social history. - Initial assessment performed. The patients presenting problems have been discussed, and the staff are in agreement with the care plan formulated and outlined with them. I have encouraged them to ask questions as they arise throughout their visit. Vital Signs-Reviewed the patient's vital signs. No data found. Provider Notes (Medical Decision Making):     [facility]         ED Course:       ED Course as of 10/17/22 1632   Sat Oct 08, 2022   1913 . Domitila Morgan 7:26 PM :Pt care assumed from Dr. Sarkis Cristina , ED provider. Pt complaint(s), current treatment plan, progression and available diagnostic results have been discussed thoroughly. The patient was seen and evaluated on my shift. Rounding occurred: yes  Intended Disposition: Pending laboratory studies and chest x-ray results as well as discussion with on-call cardiologist.  Pending diagnostic reports and/or labs (please list): Laboratory studies, chest x-ray, cardiology consult   [facility] with the cardiologist, LORE Tobias who covers for the patient's cardiologist, Dr. Kd Polk. After discussing the patient's past medical history and medications as well as current condition we agreed that the patient might benefit from metoprolol 25 mg twice daily. If this gives her rate control and she is symptomatically improved she can be discharged and call Dr. Kd Polk for an appointment on Monday. Otherwise she might benefit from overnight observation. [CA]   6017 Patient responded well to the metoprolol. She will be started on metoprolol 25 mg twice daily and as recommended by the cardiology team.  The patient is to follow-up with her cardiologist as soon as possible. She is encouraged to return to the emergency department for any worsening or concerning symptoms. At the time of disposition she is stable and in no acute distress or obvious discomfort.  Alvino Babin      ED Course User Elena Salgado MD ------------------------------------------------------------------------------------------------------------        Consultations:       Consultations:       Disposition         Discharged      Diagnosis     Clinical Impression:   1. Paroxysmal SVT (supraventricular tachycardia) ([facility]    2. Sinus tachycardia    3.  Acute renal failure superimposed on chronic kidney disease, on chronic dialysis, unspecified acute renal failure type ([facility]    4. Obesity, unspecified classification, unspecified obesity type, unspecified whether serious comorbidity present        Attestations:    Aury Ortiz MD

## 2022-10-09 LAB
ATRIAL RATE: 134 BPM
CALCULATED P AXIS, ECG09: 58 DEGREES
CALCULATED R AXIS, ECG10: 35 DEGREES
CALCULATED T AXIS, ECG11: 94 DEGREES
DIAGNOSIS, 93000: NORMAL
P-R INTERVAL, ECG05: 144 MS
Q-T INTERVAL, ECG07: 318 MS
QRS DURATION, ECG06: 80 MS
QTC CALCULATION (BEZET), ECG08: 474 MS
VENTRICULAR RATE, ECG03: 134 BPM

## 2022-10-09 NOTE — ED NOTES
I have reviewed discharge instructions with the patient. The patient verbalized understanding. Pt discharged from ED via w/c, discharged in care of her , stable in no distress.

## 2022-10-09 NOTE — DISCHARGE INSTRUCTIONS
1.  Make an appointment follow-up with your nephrologist, PCP, and cardiologist, Dr. Les Chaudhry. 2.  Return to the emergency department for any worsening or concerning symptoms. 3.  Continue your p.o. intake as previously recommended by your nephrologist and cardiologist.  4.  Take medications as prescribed. 5.  Monitor your blood pressure and heart rate twice daily. Take the readings with you when you follow-up with your cardiologist and nephrologist as well as PCP.

## 2022-10-10 NOTE — DISCHARGE SUMMARY
[address] [place] [address] Dr Matt Petty [facility], [address]     DISCHARGE SUMMARY    Name: Martin Silverman MRN: [id number]   Age / Sex: 81 y.o. / female CSN: [id number]   YOB: 1950 Length of Stay: 13 days   Admit Date: 9/9/2022 Discharge Date:        PRIMARY CARE PHYSICIAN: Wicho Claudio MD      DISCHARGE DIAGNOSES:    Acute on chronic diastolic CHF  Hypertensive emergency  Moderate COPD  Acute hypoxic respiratory failure on admission, resolved   Obesity BMI 39  Type 2 diabetes mellitus  CARA on CKD 3  Uremia  TESSA, tolerate BiPAP nightly  Hyperkalemia  Former tobacco smoker  Persistent back pain at the thoracic area    Anxiety     CONSULTS CALLED: Cardiology, nephrology      PROCEDURES DONE: Dialysis catheter placement      Nicole Weaver 67: This is a 42-year-old female with a history of uncontrolled hypertension and diastolic CHF and chronic kidney disease who presented to the ED with worsening shortness of breath. Patient was noted to have a congestive heart failure exacerbation. Patient was admitted. Diuretics were started. Nephrology and cardiology was consulted. Patient was noted to have acute kidney injury. Blood pressure was uncontrolled. Blood pressure was optimized. Patient was transitioned to hemodialysis. Dialysis catheter was placed. Patient's breathing showed improvement. Patient was evaluated by PT OT. Patient was cleared for discharge. Discharge plans and medications were discussed with the patient. Outpatient hemodialysis was arranged. Case management was involved.       MEDICATIONS ON DISCHARGE:    Discharge Medication List as of 9/22/2022 12:29 PM        START taking these medications    Details   bumetanide (BUMEX) 2 mg tablet Take 1 Tablet by mouth two (2) times a day., Normal, Disp-60 Tablet, R-0      cloNIDine HCL (CATAPRES) 0.1 mg tablet Take 1 Tablet by mouth two (2) times a day., Normal, Disp-60 Tablet, R-0           CONTINUE these medications which have CHANGED    Details   insulin detemir U-100 (Levemir U-100 Insulin) 100 unit/mL injection 15 units daily for diabetes once daily with lunch, No Print, Disp-2 mL, R-0           CONTINUE these medications which have NOT CHANGED    Details   olodateroL (STRIVERDI) 2.5 mcg/actuation mist Take 2 Puffs by inhalation in the morning., Normal, Disp-4 g, R-0      gabapentin (NEURONTIN) 400 mg capsule Take 400 mg by mouth in the morning., Historical Med      albuterol-ipratropium (DUO-NEB) 2.5 mg-0.5 mg/3 ml nebu 3 mL by Nebulization route every six (6) hours as needed for Wheezing., Normal, Disp-30 Nebule, R-0      atorvastatin (LIPITOR) 80 mg tablet Take 1 Tab by mouth daily. , Normal, Disp-90 Tab, R-3      aspirin 81 mg chewable tablet Take 2 Tabs by mouth daily. , Normal, Disp-60 Tab, R-0      umeclidinium-vilanterol (ANORO ELLIPTA) 62.5-25 mcg/actuation inhaler Take 1 Puff by inhalation daily. For COPD, Program9/22/15 Dr Keyana Cummings prescribed. Will order via [facility] if availableDisp-3 Inhaler, R-3           STOP taking these medications       predniSONE (DELTASONE) 50 mg tablet Comments:   Reason for Stopping:         doxycycline (MONODOX) 100 mg capsule Comments:   Reason for Stopping:         furosemide (LASIX) 20 mg tablet Comments:   Reason for Stopping:         hydrALAZINE (APRESOLINE) 100 mg tablet Comments:   Reason for Stopping:         tiZANidine (ZANAFLEX) 4 mg tablet Comments:   Reason for Stopping:         calcitRIOL (ROCALTROL) 0.25 mcg capsule Comments:   Reason for Stopping:         carvediloL (COREG) 25 mg tablet Comments:   Reason for Stopping:                 DISCHARGE VITAL SIGNS:  Visit Vitals  /69 (BP 1 Location: Left upper arm, BP Patient Position: Sitting)   Pulse 87   Temp 98.2 °F (36.8 °C)   Resp 18   Ht 5' 4\" (1.626 m)   Wt 110.5 kg (243 lb 9.6 oz)   SpO2 96%   BMI 41.81 kg/m²         CONDITION ON DISCHARGE: Stable.       DISPOSITION: Home with home health care      FOLLOW-UP RECOMMENDATIONS: Follow-up Information       Follow up With Specialties Details Why Contact Info    Lovely Ferrara MD Internal Medicine Physician   [facility] [address] [phone]              OTHER INSTRUCTIONS:        TIME SPENT ON DISCHARGE ACTIVITIES: More than 35 minutes. Dragon medical dictation software was used for portions of this report. Unintended errors may occur.       Signed:  Anabel Perez MD      10/9/2022

## 2022-11-28 NOTE — ED PROVIDER NOTES
EMERGENCY DEPARTMENT HISTORY AND PHYSICAL EXAM    7:23 PM      Date: 5/20/2018  Patient Name: Briseyda Varghese    History of Presenting Illness     Chief Complaint   Patient presents with    Chest Pain         History Provided By: Patient    Chief Complaint: Chest pain  Duration: 1 Hours  Timing:  Constant and Worsening  Location: Mid-chest  Quality: Aching  Severity: Severe  Modifying Factors: Nothing tried  Associated Symptoms: Right arm pain      Additional History (Context): Briseyda Varghese is a 74 y.o. female with diabetes, hypertension, COPD and dilated cardiomyopathy who presents with complaints of aching and severe middle chest pain with onset 1 hour prior to arrival. Patient notes that the pain is constant and worsening. She states that she has not had any ASA and denies any pressure on her chest. She reports some right arm pain with onset two days ago and notes that she cannot move her arm much. Patient denies any heart problems.      PCP: Zhane Nesbitt NP    Current Facility-Administered Medications   Medication Dose Route Frequency Provider Last Rate Last Dose    propofol (DIPRIVAN) infusion  0-50 mcg/kg/min IntraVENous TITRATE Krista Murrieta MD 14.8 mL/hr at 05/21/18 0413 25 mcg/kg/min at 05/21/18 0413    nitroglycerine compounded injection             eptifibatide (INTEGRILIN) 0.75 mg/mL infusion  1 mcg/kg/min IntraVENous CONTINUOUS Hima Lata Cloud MD 7.9 mL/hr at 05/21/18 0154 1 mcg/kg/min at 05/21/18 0154    insulin lispro (HUMALOG) injection   SubCUTAneous Q6H January-Nalini GRANT PA-C   2 Units at 05/21/18 0600    chlorhexidine (PERIDEX) 0.12 % mouthwash 10 mL  10 mL Oral Q12H January-Nalini GRANT PA-C   10 mL at 05/21/18 0414    albuterol-ipratropium (DUO-NEB) 2.5 MG-0.5 MG/3 ML  3 mL Nebulization Q4H RT January-Nalini GRANT PA-C   Stopped at 05/21/18 0400    midazolam (VERSED) injection 1-2 mg  1-2 mg IntraVENous Q4H PRN January-Nalini GRANT PA-C        fentaNYL citrate (PF) injection 25-50 mcg  25-50 mcg IntraVENous Q4H PRN January-Jill Elizabeth GRANT PA-C        famotidine (PF) (PEPCID) 20 mg in sodium chloride 0.9% 10 mL injection  20 mg IntraVENous DAILY January-Jill Elizabeth GRANT PA-C        heparin (porcine) 1,000 unit/mL injection 1,000-10,000 Units  1,000-10,000 Units IntraVENous PRN Gary Orlando MD   1,000 Units at 05/20/18 2116    lidocaine (PF) (XYLOCAINE) 10 mg/mL (1 %) injection 1-60 mL  1-60 mL SubCUTAneous Multiple Hima Beny GRANT MD   10 mL at 05/20/18 2009    verapamil (ISOPTIN) 2.5 mg/mL injection 2.5-5 mg  2.5-5 mg IntraVENous ONCE Hima Ana Conley MD        nitroglycerine compounded injection 100-200 mcg  100-200 mcg IntraCORONary Multiple Hima Ana Conley MD        ferrous sulfate tablet 325 mg  325 mg Oral BID Shashi Deleon MD   325 mg at 05/20/18 2204    oxyCODONE-acetaminophen (PERCOCET) 5-325 mg per tablet 1 Tab  1 Tab Oral Q6H PRN Shashi Deleon MD   1 Tab at 05/20/18 2204    naloxone (NARCAN) injection 0.4 mg  0.4 mg IntraVENous PRN Shashi Deleon MD        docusate sodium (COLACE) capsule 100 mg  100 mg Oral BID PRN Shashi Deleon MD        insulin glargine (LANTUS) injection 10 Units  10 Units SubCUTAneous DAILY Shashi Deleon MD        glucose chewable tablet 16 g  4 Tab Oral PRN Shashi Deleon MD        glucagon (GLUCAGEN) injection 1 mg  1 mg IntraMUSCular PRN Shashi Deleon MD        dextrose (D50W) injection syrg 12.5-25 g  25-50 mL IntraVENous PRN Shashi Deleon MD        nicotine (NICODERM CQ) 14 mg/24 hr patch 1 Patch  1 Patch TransDERmal Q24H Shashi Deleon MD   1 Patch at 05/20/18 2205    furosemide (LASIX) injection 20 mg  20 mg IntraVENous DAILY PRN Shashi Deleon MD        aspirin chewable tablet 81 mg  81 mg Oral DAILY Dosclaude Deleon MD        methylPREDNISolone (PF) (SOLU-MEDROL) injection 40 mg  40 mg IntraVENous Quoc Alcaraz MD   40 mg at 05/21/18 0554    albuterol-ipratropium (DUO-NEB) 2.5 MG-0.5 MG/3 ML  3 mL Nebulization Q2H PRN Johnnie Magallon MD   3 mL at 05/21/18 0246    sodium chloride (NS) flush 5-10 mL  5-10 mL IntraVENous Q8H Hima GRANT MD   10 mL at 05/21/18 0557    sodium chloride (NS) flush 5-10 mL  5-10 mL IntraVENous PRN Mehdi Garza MD        acetaminophen (TYLENOL) tablet 650 mg  650 mg Oral Q4H PRN Mehdi Garza MD        [facility] injection 4 mg  4 mg IntraVENous Q4H PRN Mehdi Garza MD        nitroglycerin (NITROSTAT) tablet 0.4 mg  0.4 mg SubLINGual Q5MIN PRN Mehdi Garza MD        hydrALAZINE (APRESOLINE) 20 mg/mL injection 20 mg  20 mg IntraVENous Q30MIN PRN Mehdi Garza MD   20 mg at 05/20/18 2235    atropine injection 0.5 mg  0.5 mg IntraVENous PRN Mehdi Garza MD        ticagrelor (BRILINTA) tablet 90 mg  90 mg Oral BID Mehdi Garza MD        atorvastatin (LIPITOR) tablet 80 mg  80 mg Oral QHS Mehdi Garza MD   80 mg at 05/20/18 2204       Past History     Past Medical History:  Past Medical History:   Diagnosis Date    Abnormal WBC count 5/17/2016    Bilateral shoulder pain 9/27/2016    Chondromalacia of both patellae     Chronic lung disease     Chronic obstructive pulmonary disease ([facility] [address] 5/17/2016    COPD (chronic obstructive pulmonary disease) ([facility] [address] 9/2015    mild-mod dz; Dr Opal Martino    Diabetes [facility]) 2013    Diabetes mellitus ([facility] [address]     Diabetic eye exam ([facility] [address] 2016    Dilated cardiomyopathy ([facility] [address]     Dyslipidemia     History of echocardiogram 11/14/2014    Mild LVE. EF 40%. Mild, diffuse hypk. Mild LAE. Mild MR.      Hypercholesteremia 1/08/14    Hypertension 2000    Noncompliance with medications 2/16/2016    Obesity     Orthostatic hypotension 5/17/2016    Osteoarthritis of both knees     Pain management 04/01/2016    Dr. Sp Mary Popliteal cyst, bilateral      Vitamin D deficiency 10/16/14       Past Surgical History:  History reviewed. No pertinent surgical history.     Family History:  Family History   Problem Relation Age of Onset    Diabetes Mother     Hypertension Mother     Hypertension Father     Kidney Disease Maternal Aunt 48     Dialysis       Social History:  Social History   Substance Use Topics    Smoking status: Former Smoker     Packs/day: 0.25     Years: 45.00     Types: Cigarettes     Quit date: 8/17/2015    Smokeless tobacco: None    Alcohol use No       Allergies:  No Known Allergies      Review of Systems       Review of Systems   Constitutional: Negative for chills and fever. Respiratory: Negative for shortness of breath. Cardiovascular: Positive for chest pain (middle). Gastrointestinal: Negative for diarrhea, nausea and vomiting. Musculoskeletal:        +Right arm pain   All other systems reviewed and are negative. Physical Exam     Visit Vitals    /81    Pulse 78    Temp 97.7 °F (36.5 °C)    Resp 15    Wt 98.9 kg (218 lb 0.6 oz)    SpO2 92%    BMI 37.43 kg/m2         Physical Exam   Constitutional: She is oriented to person, place, and time. She appears well-developed and well-nourished. HENT:   Head: Normocephalic and atraumatic. Eyes: Conjunctivae and EOM are normal. Right eye exhibits no discharge. Left eye exhibits no discharge. No scleral icterus. Neck: Normal range of motion. Neck supple. No tracheal deviation present. Cardiovascular: Tachycardia present. No murmur heard. Pulmonary/Chest: Effort normal and breath sounds normal. No respiratory distress. She has no wheezes. She has no rales. Abdominal: Soft. She exhibits no distension. There is no tenderness. There is no rebound and no guarding. Musculoskeletal: Normal range of motion. She exhibits no edema or deformity. Neurological: She is alert and oriented to person, place, and time. No cranial nerve deficit. Skin: Skin is warm and dry. She is not diaphoretic. Psychiatric: Her behavior is normal. Judgment and thought content normal. Her mood appears anxious. Diagnostic Study Results     Labs -  Recent Results (from the past 12 hour(s))   POC ACTIVATED CLOTTING TIME    Collection Time: 05/20/18  8:13 PM   Result Value Ref Range    Activated Clotting Time (POC) 158 (H) 79 - 138 SECS   POC ACTIVATED CLOTTING TIME    Collection Time: 05/20/18  8:23 PM   Result Value Ref Range    Activated Clotting Time (POC) 224 (H) 79 - 138 SECS   POC ACTIVATED CLOTTING TIME    Collection Time: 05/20/18  9:02 PM   Result Value Ref Range    Activated Clotting Time (POC) 219 (H) 79 - 138 SECS   GLUCOSE, POC    Collection Time: 05/20/18  9:51 PM   Result Value Ref Range    Glucose (POC) 145 (H) 70 - 110 mg/dL   EKG, 12 LEAD, INITIAL    Collection Time: 05/20/18 11:45 PM   Result Value Ref Range    Ventricular Rate 101 BPM    Atrial Rate 101 BPM    P-R Interval 188 ms    QRS Duration 78 ms    Q-T Interval 366 ms    QTC Calculation (Bezet) 474 ms    Calculated P Axis 75 degrees    Calculated R Axis -38 degrees    Calculated T Axis 60 degrees    Diagnosis       Sinus tachycardia  Left axis deviation  Minimal voltage criteria for LVH, may be normal variant  ST elevation, consider anterolateral injury or acute infarct  ACUTE MI  Abnormal ECG  When compared with ECG of 20-MAY-2018 19:15,  Minimal criteria for Septal infarct are no longer present  ST more elevated in Anterolateral leads  T wave inversion no longer evident in Inferior leads  T wave inversion more evident in Anterolateral leads     POC G3    Collection Time: 05/21/18 12:09 AM   Result Value Ref Range    Device: VENT      FIO2 (POC) 100 %    pH (POC) 7.246 (LL) 7.35 - 7.45      pCO2 (POC) 39.6 35.0 - 45.0 MMHG    pO2 (POC) 137 (H) 80 - 100 MMHG    HCO3 (POC) 17.2 (L) 22 - 26 MMOL/L    sO2 (POC) 99 (H) 92 - 97 %    Base deficit (POC) 10 mmol/L    Mode ASSIST CONTROL      Tidal volume 400 ml    Set Rate 16 bpm    PEEP/CPAP (POC) 8 cmH2O    Allens test (POC) YES      Total resp. rate 30      Site LEFT RADIAL      Patient temp. 37.0      Specimen type (POC) ARTERIAL      Performed by Isra Cote     Volume control plus YES     CARDIAC PANEL,(CK, CKMB & TROPONIN)    Collection Time: 05/21/18 12:10 AM   Result Value Ref Range     26 - 192 U/L    CK - MB 10.3 (H) <3.6 ng/ml    CK-MB Index 6.0 (H) 0.0 - 4.0 %    Troponin-I, Qt. 1.43 (H) 0.0 - 0.045 NG/ML   CBC W/O DIFF    Collection Time: 05/21/18 12:10 AM   Result Value Ref Range    WBC 6.5 4.6 - 13.2 K/uL    RBC 4.34 4.20 - 5.30 M/uL    HGB 11.7 (L) 12.0 - 16.0 g/dL    HCT 37.5 35.0 - 45.0 %    MCV 86.4 74.0 - 97.0 FL    MCH 27.0 24.0 - 34.0 PG    MCHC 31.2 31.0 - 37.0 g/dL    RDW 16.4 (H) 11.6 - 14.5 %    PLATELET 541 560 - 630 K/uL    MPV 10.1 9.2 - 71.9 FL   METABOLIC PANEL, BASIC    Collection Time: 05/21/18 12:10 AM   Result Value Ref Range    Sodium 134 (L) 136 - 145 mmol/L    Potassium 6.3 (HH) 3.5 - 5.5 mmol/L    Chloride 104 100 - 108 mmol/L    CO2 18 (L) 21 - 32 mmol/L    Anion gap 12 3.0 - 18 mmol/L    Glucose 366 (H) 74 - 99 mg/dL    BUN 30 (H) 7.0 - 18 MG/DL    Creatinine 2.08 (H) 0.6 - 1.3 MG/DL    BUN/Creatinine ratio 14 12 - 20      GFR est AA 29 (L) >60 ml/min/1.73m2    GFR est non-AA 24 (L) >60 ml/min/1.73m2    Calcium 8.2 (L) 8.5 - 10.1 MG/DL   LACTIC ACID    Collection Time: 05/21/18 12:10 AM   Result Value Ref Range    Lactic acid 4.8 (HH) 0.4 - 2.0 MMOL/L   DRUG SCREEN UR - W/ CONFIRM    Collection Time: 05/21/18 12:18 AM   Result Value Ref Range    BENZODIAZEPINES POSITIVE (A) NEG      BARBITURATES NEGATIVE  NEG      THC (TH-CANNABINOL) NEGATIVE  NEG      OPIATES POSITIVE (A) NEG      PCP(PHENCYCLIDINE) NEGATIVE  NEG      COCAINE NEGATIVE  NEG      AMPHETAMINES NEGATIVE  NEG      METHADONE NEGATIVE  NEG      HDSCOM (NOTE)    POC ACTIVATED CLOTTING TIME    Collection Time: 05/21/18  1:13 AM   Result Value Ref Range    Activated Clotting Time (POC) 235 (H) 79 - 138 SECS   POC ACTIVATED CLOTTING TIME    Collection Time: 05/21/18  1:47 AM   Result Value Ref Range Activated Clotting Time (POC) 235 (H) 79 - 138 SECS   MAGNESIUM    Collection Time: 05/21/18  2:30 AM   Result Value Ref Range    Magnesium 1.8 1.6 - 2.6 mg/dL   PHOSPHORUS    Collection Time: 05/21/18  2:30 AM   Result Value Ref Range    Phosphorus 4.0 2.5 - 4.9 MG/DL   METABOLIC PANEL, BASIC    Collection Time: 05/21/18  2:30 AM   Result Value Ref Range    Sodium 138 136 - 145 mmol/L    Potassium 4.9 3.5 - 5.5 mmol/L    Chloride 105 100 - 108 mmol/L    CO2 23 21 - 32 mmol/L    Anion gap 10 3.0 - 18 mmol/L    Glucose 252 (H) 74 - 99 mg/dL    BUN 31 (H) 7.0 - 18 MG/DL    Creatinine 2.06 (H) 0.6 - 1.3 MG/DL    BUN/Creatinine ratio 15 12 - 20      GFR est AA 29 (L) >60 ml/min/1.73m2    GFR est non-AA 24 (L) >60 ml/min/1.73m2    Calcium 8.0 (L) 8.5 - 10.1 MG/DL   POC G3    Collection Time: 05/21/18  2:37 AM   Result Value Ref Range    Device: VENT      FIO2 (POC) 100 %    pH (POC) 7.240 (LL) 7.35 - 7.45      pCO2 (POC) 43.6 35.0 - 45.0 MMHG    pO2 (POC) 123 (H) 80 - 100 MMHG    HCO3 (POC) 18.7 (L) 22 - 26 MMOL/L    sO2 (POC) 98 (H) 92 - 97 %    Base deficit (POC) 9 mmol/L    Mode ASSIST CONTROL      Tidal volume 400 ml    Set Rate 16 bpm    PEEP/CPAP (POC) 8 cmH2O    Allens test (POC) NO      Total resp. rate 22      Site DRAWN FROM ARTERIAL LINE      Patient temp.  37.0      Specimen type (POC) ARTERIAL      Performed by Isra Cote     Volume control plus YES     EKG, 12 LEAD, SUBSEQUENT    Collection Time: 05/21/18  2:44 AM   Result Value Ref Range    Ventricular Rate 89 BPM    Atrial Rate 89 BPM    P-R Interval 166 ms    QRS Duration 96 ms    Q-T Interval 420 ms    QTC Calculation (Bezet) 511 ms    Calculated P Axis 76 degrees    Calculated R Axis 60 degrees    Calculated T Axis 79 degrees    Diagnosis       Sinus rhythm with occasional premature ventricular complexes  ST elevation, consider anterolateral injury or acute infarct  Prolonged QT  ACUTE MI  Abnormal ECG  When compared with ECG of 20-MAY-2018 23:45,  premature ventricular complexes are now present  Questionable change in QRS duration     POC G3    Collection Time: 05/21/18  5:21 AM   Result Value Ref Range    Device: VENT      FIO2 (POC) 100 %    pH (POC) 7.302 (L) 7.35 - 7.45      pCO2 (POC) 41.3 35.0 - 45.0 MMHG    pO2 (POC) 123 (H) 80 - 100 MMHG    HCO3 (POC) 20.4 (L) 22 - 26 MMOL/L    sO2 (POC) 98 (H) 92 - 97 %    Base deficit (POC) 6 mmol/L    Mode ASSIST CONTROL      Tidal volume 450 ml    Set Rate 16 bpm    PEEP/CPAP (POC) 8 cmH2O    Allens test (POC) NO      Total resp. rate 18      Site DRAWN FROM ARTERIAL LINE      Patient temp.  37.0      Specimen type (POC) ARTERIAL      Performed by Mak Cage     Volume control plus YES     EKG, 12 LEAD, SUBSEQUENT    Collection Time: 05/21/18  5:37 AM   Result Value Ref Range    Ventricular Rate 82 BPM    Atrial Rate 82 BPM    P-R Interval 170 ms    QRS Duration 90 ms    Q-T Interval 446 ms    QTC Calculation (Bezet) 521 ms    Calculated P Axis 71 degrees    Calculated R Axis 78 degrees    Calculated T Axis 96 degrees    Diagnosis       Normal sinus rhythm  Anterolateral infarct , possibly acute  Prolonged QT  ACUTE MI  Abnormal ECG  When compared with ECG of 21-MAY-2018 02:44,  premature ventricular complexes are no longer present  Anterolateral infarct is now present  ST less elevated in Anterior leads     GLUCOSE, POC    Collection Time: 05/21/18  5:58 AM   Result Value Ref Range    Glucose (POC) 175 (H) 70 - 110 mg/dL   CBC W/O DIFF    Collection Time: 05/21/18  6:23 AM   Result Value Ref Range    WBC 11.7 4.6 - 13.2 K/uL    RBC 4.27 4.20 - 5.30 M/uL    HGB 11.8 (L) 12.0 - 16.0 g/dL    HCT 37.5 35.0 - 45.0 %    MCV 87.8 74.0 - 97.0 FL    MCH 27.6 24.0 - 34.0 PG    MCHC 31.5 31.0 - 37.0 g/dL    RDW 16.5 (H) 11.6 - 14.5 %    PLATELET 185 365 - 080 K/uL    MPV 9.8 9.2 - 11.8 FL       Radiologic Studies -   XR ABD PORT  1 V   Final Result      XR CHEST PORT   Final Result      XR CHEST PORT   Final Result XR CHEST PORT    (Results Pending)   XR CHEST PORT    (Results Pending)   XR CHEST PORT    (Results Pending)     XR CHEST PORT  IMPRESSION:       1. Diffuse prominence of the bronchovascular markings, probably represents  interstitial edema but atypical infectious process/bronchitis not excluded.     2. Mild bibasilar hazy opacities, possibly atelectasis or developing  edema/infiltrate. Medical Decision Making   I am the first provider for this patient. I reviewed the vital signs, available nursing notes, past medical history, past surgical history, family history and social history. Vital Signs-Reviewed the patient's vital signs. Pulse Oximetry Analysis -  99% on room air (Interpretation)Normal    EKG: Interpreted by the EP. Time Interpreted: 19:15   Rate: 109 bpm   Rhythm: Sinus Tachycardia    Interpretation: ST elevation in leads V1, V2, V3, V4 w/ reciprocal changes in leads 2 3 and AVF     Records Reviewed: Nursing Notes and Old Medical Records (Time of Review: 7:23 PM)    ED Course: Progress Notes, Reevaluation, and Consults:    7:19 PM Code STEMI called. 7:22 PM Dr. Demetrio Arreola will see patient for code STEMI.   7:38 PM Patient will be admitted. Provider Notes (Medical Decision Making): Patient with acute STEMI. Treated with ASA, Nitrile, and Heparin, and sent to cath lab. Critical Care Time: CRITICAL CARE:    10:09 PM  I have spent 30 minutes of critical care time involved in lab review, consultations with specialist, family decision-making, and documentation. During this entire length of time I was immediately available to the patient. Critical Care: The reason for providing this level of medical care for this critically ill patient was due a critical illness that impaired one or more vital organ systems such that there was a high probability of imminent or life threatening deterioration in the patients condition.  This care involved high complexity decision making to assess, manipulate, and support vital system functions, to treat this degreee vital organ system failure and to prevent further life threatening deterioration of the patients condition. For Hospitalized Patients:    1. Hospitalization Decision Time:  The decision to hospitalize the patient was made by Dr. Jackelyn Love at 7:38pm on 5/20/2018    2. Aspirin: Aspirin was given    Diagnosis     Clinical Impression:   1. ST elevation myocardial infarction (STEMI) of anterior wall ([facility] [address]        Disposition: Admit    Follow-up Information     Follow up With Details Comments [address], NP Go to  [address]      [facility] EMERGENCY DEPT Go to As needed, If symptoms worsen [address]  [phone]           Current Discharge Medication List      CONTINUE these medications which have NOT CHANGED    Details   insulin detemir (LEVEMIR) 100 unit/mL injection 15 units daily for diabetes  Qty: 2 Vial, Refills: 0    Associated Diagnoses: Diabetes mellitus type 2, insulin dependent (HCC)      albuterol (PROVENTIL HFA, VENTOLIN HFA, PROAIR HFA) 90 mcg/actuation inhaler Take 2 Puffs by inhalation every four (4) hours as needed for Wheezing. Qty: 1 Inhaler, Refills: 3    Associated Diagnoses: Chronic obstructive pulmonary disease, unspecified COPD type ([facility] [address] Former smoker      omega-3 acid ethyl esters (LOVAZA) 1 gram capsule Take 2 Caps by mouth daily (with breakfast). Qty: 60 Cap, Refills: 3    Associated Diagnoses: Hypertriglyceridemia      amLODIPine-atorvastatin (CADUET) 10-80 mg per tablet Take 1 Tab by mouth daily. Qty: 30 Tab, Refills: 3    Associated Diagnoses: Dyslipidemia, goal LDL below 70; Essential hypertension with goal blood pressure less than 140/90      lisinopril (PRINIVIL, ZESTRIL) 20 mg tablet Take 1 Tab by mouth daily.  For blood pressure  Qty: 30 Tab, Refills: 4    Associated Diagnoses: Essential hypertension with goal blood pressure less than 140/90      magnesium oxide (MAG-OX) 400 mg tablet Take 1 Tab by mouth daily. For low magnesium  Qty: 30 Tab, Refills: 11    Associated Diagnoses: Hypomagnesemia      desloratadine (CLARINEX) 5 mg tablet Take 1 Tab by mouth daily as needed for Allergies. Qty: 30 Tab, Refills: 0    Associated Diagnoses: Environmental allergies      mometasone (NASONEX) 50 mcg/actuation nasal spray 2 Sprays by Both Nostrils route daily as needed. For allergies  Qty: 3 Container, Refills: 3    Associated Diagnoses: Environmental allergies      umeclidinium-vilanterol (ANORO ELLIPTA) 62.5-25 mcg/actuation inhaler Take 1 Puff by inhalation daily. For COPD  Qty: 3 Inhaler, Refills: 3    Comments: 9/22/15 Dr Bryon Sy prescribed. Will order via [facility] if available  Associated Diagnoses: Chronic obstructive pulmonary disease, unspecified COPD type (HCC)      traMADol (ULTRAM) 50 mg tablet Take 1 Tab by mouth every six (6) hours as needed. Max Daily Amount: 200 mg. For arthritic pains  Qty: 60 Tab, Refills: 0    Associated Diagnoses: Primary osteoarthritis of both knees; Bilateral shoulder pain, unspecified chronicity      esomeprazole (NEXIUM) 40 mg capsule Take 1 Cap by mouth daily as needed. For reflux  Qty: 90 Cap, Refills: 0    Associated Diagnoses: Gastroesophageal reflux disease with esophagitis      albuterol (PROVENTIL VENTOLIN) 2.5 mg /3 mL (0.083 %) nebulizer solution 3 mL by Nebulization route every four (4) hours as needed for Wheezing. Qty: 60 Each, Refills: 3    Associated Diagnoses: Chronic obstructive pulmonary disease, unspecified COPD type (HCC)      ferrous sulfate 325 mg (65 mg iron) tablet Take 1 Tab by mouth two (2) times a day.  Indications: IRON DEFICIENCY ANEMIA  Qty: 60 Tab, Refills: 11    Associated Diagnoses: Iron deficiency anemia           _______________________________    Attestations:  [id number] acting as a scribe for and in the presence of Roscristy Yang, MD      May 20, 2018 at 7:23 PM       Provider Attestation:      I personally performed the services described in the documentation, reviewed the documentation, as recorded by the scribe in my presence, and it accurately and completely records my words and actions.  May 20, 2018 at 7:23 PM - Marietta Larsen MD    _______________________________ No

## 2022-12-08 ENCOUNTER — APPOINTMENT (OUTPATIENT)
Dept: CT IMAGING | Age: 72
DRG: 067 | End: 2022-12-08
Attending: EMERGENCY MEDICINE
Payer: MEDICARE

## 2022-12-08 ENCOUNTER — HOSPITAL ENCOUNTER (INPATIENT)
Age: 72
LOS: 5 days | Discharge: HOME | DRG: 067 | End: 2022-12-13
Attending: EMERGENCY MEDICINE | Admitting: STUDENT IN AN ORGANIZED HEALTH CARE EDUCATION/TRAINING PROGRAM
Payer: MEDICARE

## 2022-12-08 ENCOUNTER — APPOINTMENT (OUTPATIENT)
Dept: GENERAL RADIOLOGY | Age: 72
DRG: 067 | End: 2022-12-08
Attending: EMERGENCY MEDICINE
Payer: MEDICARE

## 2022-12-08 DIAGNOSIS — N18.6 ESRD (END STAGE RENAL DISEASE) (HCC): ICD-10-CM

## 2022-12-08 DIAGNOSIS — R73.9 HYPERGLYCEMIA: ICD-10-CM

## 2022-12-08 DIAGNOSIS — N18.6 TYPE II DIABETES MELLITUS WITH END-STAGE RENAL DISEASE (HCC): ICD-10-CM

## 2022-12-08 DIAGNOSIS — R42 DIZZINESS: Primary | ICD-10-CM

## 2022-12-08 DIAGNOSIS — E11.22 TYPE II DIABETES MELLITUS WITH END-STAGE RENAL DISEASE (HCC): ICD-10-CM

## 2022-12-08 PROBLEM — Z99.2 ESRD (END STAGE RENAL DISEASE) ON DIALYSIS (HCC): Status: ACTIVE | Noted: 2022-12-08

## 2022-12-08 PROBLEM — I63.9 CVA (CEREBRAL VASCULAR ACCIDENT) (HCC): Status: ACTIVE | Noted: 2022-12-08

## 2022-12-08 LAB
ALBUMIN SERPL-MCNC: 3.5 G/DL (ref 3.4–5)
ALBUMIN/GLOB SERPL: 1.1 {RATIO} (ref 0.8–1.7)
ALP SERPL-CCNC: 129 U/L (ref 45–117)
ALT SERPL-CCNC: 24 U/L (ref 13–56)
ANION GAP SERPL CALC-SCNC: 8 MMOL/L (ref 3–18)
AST SERPL-CCNC: 25 U/L (ref 10–38)
BASE EXCESS BLDV CALC-SCNC: 1 MMOL/L
BASOPHILS # BLD: 0 K/UL (ref 0–0.1)
BASOPHILS NFR BLD: 0 % (ref 0–2)
BILIRUB SERPL-MCNC: 0.3 MG/DL (ref 0.2–1)
BUN SERPL-MCNC: 36 MG/DL (ref 7–18)
BUN/CREAT SERPL: 10 (ref 12–20)
CALCIUM SERPL-MCNC: 9.3 MG/DL (ref 8.5–10.1)
CHLORIDE SERPL-SCNC: 98 MMOL/L (ref 100–111)
CO2 SERPL-SCNC: 28 MMOL/L (ref 21–32)
COVID-19 RAPID TEST, COVR: NOT DETECTED
CREAT SERPL-MCNC: 3.45 MG/DL (ref 0.6–1.3)
DIFFERENTIAL METHOD BLD: ABNORMAL
EOSINOPHIL # BLD: 0.1 K/UL (ref 0–0.4)
EOSINOPHIL NFR BLD: 1 % (ref 0–5)
ERYTHROCYTE [DISTWIDTH] IN BLOOD BY AUTOMATED COUNT: 16.8 % (ref 11.6–14.5)
FLUAV AG NPH QL IA: NEGATIVE
FLUBV AG NOSE QL IA: NEGATIVE
GLOBULIN SER CALC-MCNC: 3.2 G/DL (ref 2–4)
GLUCOSE SERPL-MCNC: 282 MG/DL (ref 74–99)
HCO3 BLDV-SCNC: 25.7 MMOL/L (ref 23–28)
HCT VFR BLD AUTO: 34.8 % (ref 35–45)
HGB BLD-MCNC: 10.4 G/DL (ref 12–16)
IMM GRANULOCYTES # BLD AUTO: 0 K/UL
IMM GRANULOCYTES NFR BLD AUTO: 0 %
LYMPHOCYTES # BLD: 1.9 K/UL (ref 0.9–3.6)
LYMPHOCYTES NFR BLD: 36 % (ref 21–52)
MAGNESIUM SERPL-MCNC: 2.4 MG/DL (ref 1.6–2.6)
MCH RBC QN AUTO: 28.3 PG (ref 24–34)
MCHC RBC AUTO-ENTMCNC: 29.9 G/DL (ref 31–37)
MCV RBC AUTO: 94.8 FL (ref 78–100)
MONOCYTES # BLD: 0.5 K/UL (ref 0.05–1.2)
MONOCYTES NFR BLD: 9 % (ref 3–10)
NEUTS BAND NFR BLD MANUAL: 1 % (ref 0–5)
NEUTS SEG # BLD: 2.7 K/UL (ref 1.8–8)
NEUTS SEG NFR BLD: 53 % (ref 40–73)
NRBC # BLD: 0.13 K/UL (ref 0–0.01)
NRBC BLD-RTO: 2.5 PER 100 WBC
PCO2 BLDV: 40 MMHG (ref 41–51)
PH BLDV: 7.42 [PH] (ref 7.32–7.42)
PHOSPHATE SERPL-MCNC: 4.1 MG/DL (ref 2.5–4.9)
PLATELET # BLD AUTO: 257 K/UL (ref 135–420)
PLATELET COMMENTS,PCOM: ABNORMAL
PMV BLD AUTO: 9.7 FL (ref 9.2–11.8)
PO2 BLDV: 38 MMHG (ref 25–40)
POTASSIUM SERPL-SCNC: 4.3 MMOL/L (ref 3.5–5.5)
PROT SERPL-MCNC: 6.7 G/DL (ref 6.4–8.2)
RBC # BLD AUTO: 3.67 M/UL (ref 4.2–5.3)
RBC MORPH BLD: ABNORMAL
SAO2 % BLDV: 72.8 % (ref 65–88)
SERVICE CMNT-IMP: ABNORMAL
SODIUM SERPL-SCNC: 134 MMOL/L (ref 136–145)
SOURCE, COVRS: NORMAL
SPECIMEN TYPE: ABNORMAL
TROPONIN-HIGH SENSITIVITY: 22 NG/L (ref 0–54)
WBC # BLD AUTO: 5.2 K/UL (ref 4.6–13.2)

## 2022-12-08 PROCEDURE — 80053 COMPREHEN METABOLIC PANEL: CPT

## 2022-12-08 PROCEDURE — 83735 ASSAY OF MAGNESIUM: CPT

## 2022-12-08 PROCEDURE — 65270000046 HC RM TELEMETRY

## 2022-12-08 PROCEDURE — 84484 ASSAY OF TROPONIN QUANT: CPT

## 2022-12-08 PROCEDURE — 70496 CT ANGIOGRAPHY HEAD: CPT

## 2022-12-08 PROCEDURE — 85025 COMPLETE CBC W/AUTO DIFF WBC: CPT

## 2022-12-08 PROCEDURE — 82803 BLOOD GASES ANY COMBINATION: CPT

## 2022-12-08 PROCEDURE — 87804 INFLUENZA ASSAY W/OPTIC: CPT

## 2022-12-08 PROCEDURE — 99285 EMERGENCY DEPT VISIT HI MDM: CPT

## 2022-12-08 PROCEDURE — 72131 CT LUMBAR SPINE W/O DYE: CPT

## 2022-12-08 PROCEDURE — 84100 ASSAY OF PHOSPHORUS: CPT

## 2022-12-08 PROCEDURE — 87635 SARS-COV-2 COVID-19 AMP PRB: CPT

## 2022-12-08 PROCEDURE — 93005 ELECTROCARDIOGRAM TRACING: CPT

## 2022-12-08 PROCEDURE — 70450 CT HEAD/BRAIN W/O DYE: CPT

## 2022-12-08 PROCEDURE — 71046 X-RAY EXAM CHEST 2 VIEWS: CPT

## 2022-12-08 PROCEDURE — 74011000636 HC RX REV CODE- 636: Performed by: EMERGENCY MEDICINE

## 2022-12-08 RX ORDER — HYDROCHLOROTHIAZIDE 25 MG/1
25 TABLET ORAL DAILY
Status: ON HOLD | COMMUNITY
End: 2022-12-09

## 2022-12-08 RX ORDER — CARVEDILOL 25 MG/1
25 TABLET ORAL 2 TIMES DAILY WITH MEALS
Status: ON HOLD | COMMUNITY
End: 2022-12-09

## 2022-12-08 RX ADMIN — IOPAMIDOL 80 ML: 755 INJECTION, SOLUTION INTRAVENOUS at 15:48

## 2022-12-08 NOTE — ED TRIAGE NOTES
Pt presents with complains of leg weakness and dizziness with standing. States today when walking to the car to go to dialysis her legs gave out and she almost fell and felt dizzy when standing. States the same thing happened Tuesday but the dialysis nurse helped her into the back and she got her normal dialysis and symptoms did not return until today. Pt has been receiving dialysis for 10 weeks.

## 2022-12-08 NOTE — ED NOTES
Pt placed on stretcher. Reports having symptoms of dizziness and bilateral leg weakness. Pt denies chest pain and reports no unusual shortness of breath. Pt has constant shortness of breath due to hx of COPD. Pt states symptoms began today when arriving to facility for dialysis. Pt did not receive dialysis.

## 2022-12-08 NOTE — ED PROVIDER NOTES
EMERGENCY DEPARTMENT HISTORY AND PHYSICAL EXAM      Date: 12/8/2022  Patient Name: Maria D Tena      History of Presenting Illness     No chief complaint on file. Location/Duration/Severity/Modifying factors   No chief complaint on file. HPI:  Maria D Tena is a 81 y.o. female with history as listed presents with a concern of this. All over body pain and bilateral lower leg weakness. The patient says that she has been having ongoing issues with her both of her legs giving out nearly every single time that she stands up. Also reports having dizziness that happens every single time that she stands up. Has not found anything that makes her symptoms any better. Due to her ongoing symptoms was recommended to come to the emergency room for further evaluation and treatment. Location: Bilateral legs  Duration: Approximately since Tuesday  Quality: Just a generalized weakness of the legs  Severity: Moderate  Modifying Factors: Was initially better with time. Did not occur on Wednesday but then occurred on Thursday this morning when she went to get out of bed. Additional History: There are no other complaints, changes, or physical findings at this time. PCP: Meño Donnelly MD    Current Outpatient Medications   Medication Sig Dispense Refill    carvediloL (COREG) 25 mg tablet Take 25 mg by mouth two (2) times daily (with meals). hydroCHLOROthiazide (HYDRODIURIL) 25 mg tablet Take 25 mg by mouth daily. metoprolol tartrate (LOPRESSOR) 25 mg tablet Take 1 Tablet by mouth two (2) times a day. 60 Tablet 0    gabapentin (NEURONTIN) 400 mg capsule Take 400 mg by mouth in the morning. aspirin 81 mg chewable tablet Take 2 Tabs by mouth daily.  60 Tab 0    insulin detemir U-100 (Levemir U-100 Insulin) 100 unit/mL injection 15 units daily for diabetes once daily with lunch 2 mL 0    albuterol-ipratropium (DUO-NEB) 2.5 mg-0.5 mg/3 ml nebu 3 mL by Nebulization route every six (6) hours as needed for Wheezing. 30 Nebule 0    umeclidinium-vilanterol (ANORO ELLIPTA) 62.5-25 mcg/actuation inhaler Take 1 Puff by inhalation daily. For COPD 3 Inhaler 3       Past History     Past Medical History:  Past Medical History:   Diagnosis Date    Abnormal WBC count 5/17/2016    Anemia     Bilateral shoulder pain 9/27/2016    Chondromalacia of both patellae     COPD (chronic obstructive pulmonary disease) ([facility] [address] 9/2015    mild-mod dz; Dr Hastings [place]    Diabetes [facility]) 2013    Diabetic eye exam ([facility] [address] 2016    D[facility]     Dyslipidemia     Gout     Heart attack ([facility] 10/18/2019    History of echocardiogram 11/14/2014    Mild LVE. EF 40%. Mild, diffuse hypk. Mild LAE. Mild MR. Hypercholesteremia 1/08/14    Hypertension 2000    Noncompliance with medications 2/16/2016    Obesity     Orthostatic hypotension 5/17/2016    Osteoarthritis of both knees     Pain management 04/01/2016    Dr. Carmen Anders     Popliteal cyst, bilateral      Stage 3b chronic kidney disease ([facility] [address] 8/8/2022    Vitamin D deficiency 10/16/14       Past Surgical History:  Past Surgical History:   Procedure Laterality Date    HX HEART CATHETERIZATION      HX TUBAL LIGATION      IR INSERT TUNL CVC W/O PORT OVER 5 YR  9/21/2022       Family History:  Family History   Problem Relation Age of Onset    Diabetes Mother     Hypertension Mother     Hypertension Father     Kidney Disease Maternal Aunt 48        Dialysis       Social History:  Social History     Tobacco Use    Smoking status: Former     Packs/day: 1.00     Years: 52.00     Pack years: 52.00     Types: Cigarettes     Quit date: 5/20/2019     Years since quitting: 3.5    Smokeless tobacco: Never   Substance Use Topics    Alcohol use: No     Alcohol/week: 0.0 standard drinks    Drug use: No       Allergies:  No Known Allergies      Review of Systems     Review of Systems   Constitutional:  Negative for fatigue and fever.    HENT:  Negative for sore throat and trouble swallowing. Eyes:  Negative for photophobia and visual disturbance. Respiratory:  Negative for cough and shortness of breath. Cardiovascular:  Negative for chest pain and palpitations. Gastrointestinal:  Negative for abdominal pain and diarrhea. Genitourinary:  Negative for dysuria and flank pain. Musculoskeletal:  Positive for back pain. Negative for neck pain and neck stiffness. Skin:  Negative for pallor and rash. Neurological:  Positive for dizziness. Negative for weakness and numbness. Physical Exam     Physical Exam  Constitutional:       Appearance: Normal appearance. HENT:      Head: Normocephalic and atraumatic. Right Ear: External ear normal.      Left Ear: External ear normal.      Nose: No congestion or rhinorrhea. Mouth/Throat:      Pharynx: No oropharyngeal exudate or posterior oropharyngeal erythema. Cardiovascular:      Rate and Rhythm: Normal rate and regular rhythm. Pulmonary:      Effort: Pulmonary effort is normal.      Breath sounds: Normal breath sounds. Abdominal:      General: Abdomen is flat. Palpations: Abdomen is soft. Musculoskeletal:         General: No swelling or tenderness. Normal range of motion. Cervical back: Normal range of motion and neck supple. Skin:     Coloration: Skin is not pale. Neurological:      General: No focal deficit present. Mental Status: She is alert. Mental status is at baseline. Cranial Nerves: No cranial nerve deficit. Sensory: No sensory deficit. Motor: No weakness.        Lab and Diagnostic Study Results     Labs -  Recent Results (from the past 24 hour(s))   EKG, 12 LEAD, INITIAL    Collection Time: 12/08/22 12:19 PM   Result Value Ref Range    Ventricular Rate 77 BPM    Atrial Rate 77 BPM    P-R Interval 164 ms    QRS Duration 78 ms    Q-T Interval 420 ms    QTC Calculation (Bezet) 475 ms    Calculated P Axis 77 degrees    Calculated R Axis -4 degrees    Calculated T Axis 70 degrees Diagnosis       Normal sinus rhythm  Moderate voltage criteria for LVH, may be normal variant  Prolonged QT  Abnormal ECG  When compared with ECG of 08-OCT-2022 17:02,  Vent. rate has decreased BY  57 BPM  ST no longer depressed in Inferior leads  Non-specific change in ST segment in Lateral leads  T wave inversion no longer evident in Lateral leads     CBC WITH AUTOMATED DIFF    Collection Time: 12/08/22 12:25 PM   Result Value Ref Range    WBC 5.2 4.6 - 13.2 K/uL    RBC 3.67 (L) 4.20 - 5.30 M/uL    HGB 10.4 (L) 12.0 - 16.0 g/dL    HCT 34.8 (L) 35.0 - 45.0 %    MCV 94.8 78.0 - 100.0 FL    MCH 28.3 24.0 - 34.0 PG    MCHC 29.9 (L) 31.0 - 37.0 g/dL    RDW 16.8 (H) 11.6 - 14.5 %    PLATELET 624 139 - 973 K/uL    MPV 9.7 9.2 - 11.8 FL    NRBC 2.5 (H) 0  WBC    ABSOLUTE NRBC 0.13 (H) 0.00 - 0.01 K/uL    NEUTROPHILS 53 40 - 73 %    BAND NEUTROPHILS 1 0 - 5 %    LYMPHOCYTES 36 21 - 52 %    MONOCYTES 9 3 - 10 %    EOSINOPHILS 1 0 - 5 %    BASOPHILS 0 0 - 2 %    IMMATURE GRANULOCYTES 0 %    ABS. NEUTROPHILS 2.7 1.8 - 8.0 K/UL    ABS. LYMPHOCYTES 1.9 0.9 - 3.6 K/UL    ABS. MONOCYTES 0.5 0.05 - 1.2 K/UL    ABS. EOSINOPHILS 0.1 0.0 - 0.4 K/UL    ABS. BASOPHILS 0.0 0.0 - 0.1 K/UL    ABS. IMM. GRANS. 0.0 K/UL    DF MANUAL      PLATELET COMMENTS ADEQUATE PLATELETS      RBC COMMENTS ANISOCYTOSIS  1+       METABOLIC PANEL, COMPREHENSIVE    Collection Time: 12/08/22 12:25 PM   Result Value Ref Range    Sodium 134 (L) 136 - 145 mmol/L    Potassium 4.3 3.5 - 5.5 mmol/L    Chloride 98 (L) 100 - 111 mmol/L    CO2 28 21 - 32 mmol/L    Anion gap 8 3.0 - 18 mmol/L    Glucose 282 (H) 74 - 99 mg/dL    BUN 36 (H) 7.0 - 18 MG/DL    Creatinine 3.45 (H) 0.6 - 1.3 MG/DL    BUN/Creatinine ratio 10 (L) 12 - 20      eGFR 14 (L) >60 ml/min/1.73m2    Calcium 9.3 8.5 - 10.1 MG/DL    Bilirubin, total 0.3 0.2 - 1.0 MG/DL    ALT (SGPT) 24 13 - 56 U/L    AST (SGOT) 25 10 - 38 U/L    Alk.  phosphatase 129 (H) 45 - 117 U/L    Protein, total 6.7 6.4 - 8.2 g/dL    Albumin 3.5 3.4 - 5.0 g/dL    Globulin 3.2 2.0 - 4.0 g/dL    A-G Ratio 1.1 0.8 - 1.7     TROPONIN-HIGH SENSITIVITY    Collection Time: 12/08/22 12:25 PM   Result Value Ref Range    Troponin-High Sensitivity 22 0 - 54 ng/L   MAGNESIUM    Collection Time: 12/08/22 12:25 PM   Result Value Ref Range    Magnesium 2.4 1.6 - 2.6 mg/dL   PHOSPHORUS    Collection Time: 12/08/22 12:25 PM   Result Value Ref Range    Phosphorus 4.1 2.5 - 4.9 MG/DL   POC VENOUS BLOOD GAS    Collection Time: 12/08/22 12:37 PM   Result Value Ref Range    pH, venous (POC) 7.42 7.32 - 7.42      pCO2, venous (POC) 40.0 (L) 41 - 51 MMHG    pO2, venous (POC) 38 25 - 40 mmHg    HCO3, venous (POC) 25.7 23.0 - 28.0 MMOL/L    sO2, venous (POC) 72.8 65 - 88 %    Base excess, venous (POC) 1.0 mmol/L    Specimen type (POC) VENOUS BLOOD      Performed by [employer] (RAPID TEST)    Collection Time: 12/08/22  1:25 PM   Result Value Ref Range    Influenza A Antigen Negative NEG      Influenza B Antigen Negative NEG     COVID-19 RAPID TEST    Collection Time: 12/08/22  1:25 PM   Result Value Ref Range    Specimen source Nasopharyngeal      COVID-19 rapid test Not detected           Radiologic Studies -   CTA HEAD NECK W CONT         CT HEAD WO CONT   Final Result      No acute findings. Chronic microvascular disease. Stable small chronic left frontal lobe infarct. CT SPINE LUMB WO CONT   Final Result   No acute findings. Mild degenerative disc disease. Multilevel lumbar facet hypertrophy, greatest at right L4-L5. Stable Grade I spondylolisthesis at L4-L5 and L5-S1. XR CHEST PA LAT   Final Result   Similar mild diffuse prominence of interstitial markings which may reflect   interstitial edema or chronic lung disease.             Procedures and Critical Care       Performed by: Jaylen Sands MD    Procedures         Jaylen Sands MD    Medical Decision Making and ED Course   - I am the first and primary provider for this patient AND AM THE PRIMARY PROVIDER OF RECORD. - I reviewed the vital signs, available nursing notes, past medical history, past surgical history, family history and social history. - Initial assessment performed. The patients presenting problems have been discussed, and the staff are in agreement with the care plan formulated and outlined with them. I have encouraged them to ask questions as they arise throughout their visit. Vital Signs-Reviewed the patient's vital signs. Patient Vitals for the past 12 hrs:   Temp Pulse Resp BP SpO2   12/08/22 1103 98.4 °F (36.9 °C) 71 18 105/74 100 %         Provider Notes (Medical Decision Making):     MDM  Number of Diagnoses or Management Options  Dizziness  ESRD (end stage renal disease) ([facility] [address]  Hyperglycemia  Type II diabetes mellitus with end-stage renal disease ([facility] [address]  Diagnosis management comments: Patient presenting with intermittent periods of bilateral lower leg weakness and feelings of dizziness. We will evaluate the lumbar spine for evidence of fracture or any other obvious pathology. She has no saddle anesthesia based on history. She has no bilateral lower leg weakness or numbness. Uncertain as to the exact etiology of her symptoms. Vital signs are reassuring. ED Course:       ED Course as of 12/08/22 1709   Thu Dec 08, 2022   1449 Giving further history that she actually feels profoundly off balance and is having a hard time with walking that is also been ongoing for the last 2 to 3 days intermittently. No known last well. CT head as well as CTA head and neck. [RS]   2481 Patient turned over to Dr. Zeyad Floyd pending evaluation by teleneurology as well as CT of the head as well as CTA head and neck to evaluate for potential CVA. As well as teleneurology consultation.  [RS]      ED Course User Index  [RS] Lucy Pappas MD ------------------------------------------------------------------------------------------------------------  4:15 PM :Pt care assumed from Dr. Kasi Porter , ED provider. Pt complaint(s), current treatment plan, progression and available diagnostic results have been discussed thoroughly. The patient was seen and evaluated on my shift. Rounding occurred: yes  Intended Disposition: Pending Teleneuro-evaluation  Pending diagnostic reports and/or labs (please list): Pending teleneurology evaluation      Consultations:       Consultations:   4:46 PM  Claudia Smith DO discussed care with Dr. Carpenter(Tele-neurologist). Standard discussion; including history of patients chief complaint, available diagnostic results, and treatment course. Reports that the patient's symptoms are atypical and may be due to benign positional vertigo. He states that the patient does have decreased sensation on the right side of her body and recommends admission for stroke evaluation including MRI. He states that he discussed antiplatelet medication Plavix with the patient reports that a previous doctor informed her that similar medication caused her kidney failure. A stat he recommends initiation of Plavix if the patient does not have any contraindication. Case signed out to Dr. Jenny Schmid pending discussion with the Hospitalist.       Disposition               Diagnosis     Clinical Impression:   1. Dizziness    2. ESRD (end stage renal disease) ([facility] [address]    3. Hyperglycemia    4.  Type II diabetes mellitus with end-stage renal disease (HCC)        Attestations:    Radha Marsh MD

## 2022-12-08 NOTE — PROGRESS NOTES
INTERIM UPDATE - 8388 EST on 12/08/2022    [facility] ER Physician calls requesting admission for a Patient with Severe Dizziness with concern for a (posterior) CVA. CT Head with Contrast is (-) for Hemorrhage. CTA shows severe Stenosis of Bilateral Vertebral arteries. Plan:  Asked [facility] Physician whether or not recommendations have been made by Tele-Neurologist based on the Bilateral Severe Stenoses of Vertebral Arteries and the recommendations were reviewed by [facility] Physician and it was determined that this information had not yet been considered by Tele-Neurologist.    Anticipate no change in management and admission to [facility] Telemetry Unit for management of Dizziness with suspected CVA/TIA; however, will await most up-to-date recommendations from Tele-Neurologist (which might include possible Transfer, though unlikely).

## 2022-12-09 ENCOUNTER — APPOINTMENT (OUTPATIENT)
Dept: MRI IMAGING | Age: 72
DRG: 067 | End: 2022-12-09
Attending: STUDENT IN AN ORGANIZED HEALTH CARE EDUCATION/TRAINING PROGRAM
Payer: MEDICARE

## 2022-12-09 PROBLEM — G45.9 TIA (TRANSIENT ISCHEMIC ATTACK): Status: ACTIVE | Noted: 2022-12-09

## 2022-12-09 PROBLEM — R42 DIZZINESS: Status: ACTIVE | Noted: 2022-12-09

## 2022-12-09 PROBLEM — I65.03 STENOSIS OF BOTH VERTEBRAL ARTERIES: Status: ACTIVE | Noted: 2022-12-09

## 2022-12-09 LAB
ALBUMIN SERPL-MCNC: 3.3 G/DL (ref 3.4–5)
ALBUMIN/GLOB SERPL: 1 {RATIO} (ref 0.8–1.7)
ALP SERPL-CCNC: 119 U/L (ref 45–117)
ALT SERPL-CCNC: 21 U/L (ref 13–56)
ANION GAP SERPL CALC-SCNC: 8 MMOL/L (ref 3–18)
AST SERPL-CCNC: 24 U/L (ref 10–38)
ATRIAL RATE: 77 BPM
BASOPHILS # BLD: 0 K/UL (ref 0–0.1)
BASOPHILS NFR BLD: 0 % (ref 0–2)
BILIRUB SERPL-MCNC: 0.4 MG/DL (ref 0.2–1)
BUN SERPL-MCNC: 33 MG/DL (ref 7–18)
BUN/CREAT SERPL: 15 (ref 12–20)
CALCIUM SERPL-MCNC: 9.6 MG/DL (ref 8.5–10.1)
CALCULATED P AXIS, ECG09: 77 DEGREES
CALCULATED R AXIS, ECG10: -4 DEGREES
CALCULATED T AXIS, ECG11: 70 DEGREES
CHLORIDE SERPL-SCNC: 103 MMOL/L (ref 100–111)
CO2 SERPL-SCNC: 26 MMOL/L (ref 21–32)
CREAT SERPL-MCNC: 2.22 MG/DL (ref 0.6–1.3)
DIAGNOSIS, 93000: NORMAL
DIFFERENTIAL METHOD BLD: ABNORMAL
EOSINOPHIL # BLD: 0.1 K/UL (ref 0–0.4)
EOSINOPHIL NFR BLD: 3 % (ref 0–5)
ERYTHROCYTE [DISTWIDTH] IN BLOOD BY AUTOMATED COUNT: 16.8 % (ref 11.6–14.5)
EST. AVERAGE GLUCOSE BLD GHB EST-MCNC: 146 MG/DL
GLOBULIN SER CALC-MCNC: 3.2 G/DL (ref 2–4)
GLUCOSE BLD STRIP.AUTO-MCNC: 141 MG/DL (ref 70–110)
GLUCOSE BLD STRIP.AUTO-MCNC: 143 MG/DL (ref 70–110)
GLUCOSE BLD STRIP.AUTO-MCNC: 193 MG/DL (ref 70–110)
GLUCOSE SERPL-MCNC: 137 MG/DL (ref 74–99)
HBA1C MFR BLD: 6.7 % (ref 4.2–5.6)
HBV SURFACE AG SER QL: <0.1 INDEX
HBV SURFACE AG SER QL: NEGATIVE
HCT VFR BLD AUTO: 33.2 % (ref 35–45)
HGB BLD-MCNC: 10.1 G/DL (ref 12–16)
IMM GRANULOCYTES # BLD AUTO: 0.1 K/UL (ref 0–0.04)
IMM GRANULOCYTES NFR BLD AUTO: 1 % (ref 0–0.5)
LYMPHOCYTES # BLD: 1.6 K/UL (ref 0.9–3.6)
LYMPHOCYTES NFR BLD: 32 % (ref 21–52)
MCH RBC QN AUTO: 28.3 PG (ref 24–34)
MCHC RBC AUTO-ENTMCNC: 30.4 G/DL (ref 31–37)
MCV RBC AUTO: 93 FL (ref 78–100)
MONOCYTES # BLD: 0.6 K/UL (ref 0.05–1.2)
MONOCYTES NFR BLD: 12 % (ref 3–10)
NEUTS SEG # BLD: 2.6 K/UL (ref 1.8–8)
NEUTS SEG NFR BLD: 53 % (ref 40–73)
NRBC # BLD: 0.15 K/UL (ref 0–0.01)
NRBC BLD-RTO: 3 PER 100 WBC
P-R INTERVAL, ECG05: 164 MS
PLATELET # BLD AUTO: 254 K/UL (ref 135–420)
PMV BLD AUTO: 9.5 FL (ref 9.2–11.8)
POTASSIUM SERPL-SCNC: 4.3 MMOL/L (ref 3.5–5.5)
PROT SERPL-MCNC: 6.5 G/DL (ref 6.4–8.2)
Q-T INTERVAL, ECG07: 420 MS
QRS DURATION, ECG06: 78 MS
QTC CALCULATION (BEZET), ECG08: 475 MS
RBC # BLD AUTO: 3.57 M/UL (ref 4.2–5.3)
SODIUM SERPL-SCNC: 137 MMOL/L (ref 136–145)
VENTRICULAR RATE, ECG03: 77 BPM
WBC # BLD AUTO: 5 K/UL (ref 4.6–13.2)

## 2022-12-09 PROCEDURE — 99223 1ST HOSP IP/OBS HIGH 75: CPT | Performed by: STUDENT IN AN ORGANIZED HEALTH CARE EDUCATION/TRAINING PROGRAM

## 2022-12-09 PROCEDURE — 97530 THERAPEUTIC ACTIVITIES: CPT

## 2022-12-09 PROCEDURE — 83036 HEMOGLOBIN GLYCOSYLATED A1C: CPT

## 2022-12-09 PROCEDURE — 80053 COMPREHEN METABOLIC PANEL: CPT

## 2022-12-09 PROCEDURE — 86706 HEP B SURFACE ANTIBODY: CPT

## 2022-12-09 PROCEDURE — 74011250637 HC RX REV CODE- 250/637: Performed by: EMERGENCY MEDICINE

## 2022-12-09 PROCEDURE — 82962 GLUCOSE BLOOD TEST: CPT

## 2022-12-09 PROCEDURE — 97162 PT EVAL MOD COMPLEX 30 MIN: CPT

## 2022-12-09 PROCEDURE — 74011250636 HC RX REV CODE- 250/636: Performed by: STUDENT IN AN ORGANIZED HEALTH CARE EDUCATION/TRAINING PROGRAM

## 2022-12-09 PROCEDURE — 85025 COMPLETE CBC W/AUTO DIFF WBC: CPT

## 2022-12-09 PROCEDURE — 36415 COLL VENOUS BLD VENIPUNCTURE: CPT

## 2022-12-09 PROCEDURE — 2709999900 HC NON-CHARGEABLE SUPPLY

## 2022-12-09 PROCEDURE — 77030038269 HC DRN EXT URIN PURWCK BARD -A

## 2022-12-09 PROCEDURE — 74011250636 HC RX REV CODE- 250/636: Performed by: EMERGENCY MEDICINE

## 2022-12-09 PROCEDURE — 74011250637 HC RX REV CODE- 250/637: Performed by: STUDENT IN AN ORGANIZED HEALTH CARE EDUCATION/TRAINING PROGRAM

## 2022-12-09 PROCEDURE — 70551 MRI BRAIN STEM W/O DYE: CPT

## 2022-12-09 PROCEDURE — 74011000250 HC RX REV CODE- 250: Performed by: STUDENT IN AN ORGANIZED HEALTH CARE EDUCATION/TRAINING PROGRAM

## 2022-12-09 PROCEDURE — 87340 HEPATITIS B SURFACE AG IA: CPT

## 2022-12-09 PROCEDURE — 65270000046 HC RM TELEMETRY

## 2022-12-09 PROCEDURE — 74011636637 HC RX REV CODE- 636/637: Performed by: STUDENT IN AN ORGANIZED HEALTH CARE EDUCATION/TRAINING PROGRAM

## 2022-12-09 RX ORDER — HYDROCHLOROTHIAZIDE 25 MG/1
25 TABLET ORAL DAILY
Status: DISCONTINUED | OUTPATIENT
Start: 2022-12-10 | End: 2022-12-09

## 2022-12-09 RX ORDER — CHOLECALCIFEROL (VITAMIN D3) 125 MCG
5 CAPSULE ORAL
Status: DISCONTINUED | OUTPATIENT
Start: 2022-12-09 | End: 2022-12-13 | Stop reason: HOSPADM

## 2022-12-09 RX ORDER — GABAPENTIN 400 MG/1
400 CAPSULE ORAL DAILY
Status: DISCONTINUED | OUTPATIENT
Start: 2022-12-10 | End: 2022-12-13 | Stop reason: HOSPADM

## 2022-12-09 RX ORDER — ACETAMINOPHEN 325 MG/1
650 TABLET ORAL
Status: DISCONTINUED | OUTPATIENT
Start: 2022-12-09 | End: 2022-12-13 | Stop reason: HOSPADM

## 2022-12-09 RX ORDER — CARVEDILOL 25 MG/1
25 TABLET ORAL 2 TIMES DAILY WITH MEALS
Status: DISCONTINUED | OUTPATIENT
Start: 2022-12-09 | End: 2022-12-09

## 2022-12-09 RX ORDER — HYDRALAZINE HYDROCHLORIDE 100 MG/1
100 TABLET, FILM COATED ORAL 2 TIMES DAILY
COMMUNITY
Start: 2022-10-31

## 2022-12-09 RX ORDER — INSULIN LISPRO 100 [IU]/ML
INJECTION, SOLUTION INTRAVENOUS; SUBCUTANEOUS
Status: DISCONTINUED | OUTPATIENT
Start: 2022-12-09 | End: 2022-12-13 | Stop reason: HOSPADM

## 2022-12-09 RX ORDER — HYDRALAZINE HYDROCHLORIDE 50 MG/1
100 TABLET, FILM COATED ORAL 2 TIMES DAILY
Status: DISCONTINUED | OUTPATIENT
Start: 2022-12-09 | End: 2022-12-13 | Stop reason: HOSPADM

## 2022-12-09 RX ORDER — METOPROLOL TARTRATE 25 MG/1
25 TABLET, FILM COATED ORAL 2 TIMES DAILY
Status: DISCONTINUED | OUTPATIENT
Start: 2022-12-09 | End: 2022-12-13 | Stop reason: HOSPADM

## 2022-12-09 RX ORDER — ASPIRIN 325 MG
325 TABLET ORAL
Status: COMPLETED | OUTPATIENT
Start: 2022-12-09 | End: 2022-12-09

## 2022-12-09 RX ORDER — SODIUM CHLORIDE 0.9 % (FLUSH) 0.9 %
5-40 SYRINGE (ML) INJECTION EVERY 8 HOURS
Status: DISCONTINUED | OUTPATIENT
Start: 2022-12-09 | End: 2022-12-13 | Stop reason: HOSPADM

## 2022-12-09 RX ORDER — TIZANIDINE 4 MG/1
4 TABLET ORAL
COMMUNITY
Start: 2022-11-14

## 2022-12-09 RX ORDER — OXYCODONE AND ACETAMINOPHEN 5; 325 MG/1; MG/1
1 TABLET ORAL
Status: DISCONTINUED | OUTPATIENT
Start: 2022-12-09 | End: 2022-12-13 | Stop reason: HOSPADM

## 2022-12-09 RX ORDER — MORPHINE SULFATE 2 MG/ML
2 INJECTION, SOLUTION INTRAMUSCULAR; INTRAVENOUS
Status: COMPLETED | OUTPATIENT
Start: 2022-12-09 | End: 2022-12-09

## 2022-12-09 RX ORDER — MAGNESIUM SULFATE 100 %
4 CRYSTALS MISCELLANEOUS AS NEEDED
Status: DISCONTINUED | OUTPATIENT
Start: 2022-12-09 | End: 2022-12-13 | Stop reason: HOSPADM

## 2022-12-09 RX ORDER — IPRATROPIUM BROMIDE AND ALBUTEROL SULFATE 2.5; .5 MG/3ML; MG/3ML
3 SOLUTION RESPIRATORY (INHALATION)
Status: DISCONTINUED | OUTPATIENT
Start: 2022-12-09 | End: 2022-12-13 | Stop reason: HOSPADM

## 2022-12-09 RX ORDER — ONDANSETRON 2 MG/ML
4 INJECTION INTRAMUSCULAR; INTRAVENOUS
Status: DISCONTINUED | OUTPATIENT
Start: 2022-12-09 | End: 2022-12-13 | Stop reason: HOSPADM

## 2022-12-09 RX ORDER — SODIUM CHLORIDE 0.9 % (FLUSH) 0.9 %
5-40 SYRINGE (ML) INJECTION AS NEEDED
Status: DISCONTINUED | OUTPATIENT
Start: 2022-12-09 | End: 2022-12-13 | Stop reason: HOSPADM

## 2022-12-09 RX ORDER — ONDANSETRON 4 MG/1
4 TABLET, ORALLY DISINTEGRATING ORAL
Status: DISCONTINUED | OUTPATIENT
Start: 2022-12-09 | End: 2022-12-13 | Stop reason: HOSPADM

## 2022-12-09 RX ORDER — DEXTROSE MONOHYDRATE 100 MG/ML
0-250 INJECTION, SOLUTION INTRAVENOUS AS NEEDED
Status: DISCONTINUED | OUTPATIENT
Start: 2022-12-09 | End: 2022-12-13 | Stop reason: HOSPADM

## 2022-12-09 RX ORDER — HEPARIN SODIUM 5000 [USP'U]/ML
5000 INJECTION, SOLUTION INTRAVENOUS; SUBCUTANEOUS EVERY 8 HOURS
Status: DISCONTINUED | OUTPATIENT
Start: 2022-12-09 | End: 2022-12-13 | Stop reason: HOSPADM

## 2022-12-09 RX ORDER — NYSTATIN 100000 [USP'U]/G
POWDER TOPICAL 2 TIMES DAILY
Status: DISCONTINUED | OUTPATIENT
Start: 2022-12-09 | End: 2022-12-13 | Stop reason: HOSPADM

## 2022-12-09 RX ORDER — POLYETHYLENE GLYCOL 3350 17 G/17G
17 POWDER, FOR SOLUTION ORAL DAILY PRN
Status: DISCONTINUED | OUTPATIENT
Start: 2022-12-09 | End: 2022-12-13 | Stop reason: HOSPADM

## 2022-12-09 RX ORDER — OXYCODONE AND ACETAMINOPHEN 5; 325 MG/1; MG/1
1 TABLET ORAL ONCE
Status: COMPLETED | OUTPATIENT
Start: 2022-12-09 | End: 2022-12-09

## 2022-12-09 RX ADMIN — HYDRALAZINE HYDROCHLORIDE 100 MG: 50 TABLET, FILM COATED ORAL at 12:49

## 2022-12-09 RX ADMIN — MORPHINE SULFATE 2 MG: 2 INJECTION, SOLUTION INTRAMUSCULAR; INTRAVENOUS at 00:25

## 2022-12-09 RX ADMIN — SODIUM CHLORIDE, PRESERVATIVE FREE 10 ML: 5 INJECTION INTRAVENOUS at 17:04

## 2022-12-09 RX ADMIN — Medication 2 UNITS: at 17:07

## 2022-12-09 RX ADMIN — ASPIRIN 325 MG ORAL TABLET 325 MG: 325 PILL ORAL at 08:08

## 2022-12-09 RX ADMIN — METOPROLOL TARTRATE 25 MG: 25 TABLET, FILM COATED ORAL at 17:08

## 2022-12-09 RX ADMIN — MORPHINE SULFATE 2 MG: 2 INJECTION, SOLUTION INTRAMUSCULAR; INTRAVENOUS at 06:18

## 2022-12-09 RX ADMIN — HYDRALAZINE HYDROCHLORIDE 100 MG: 50 TABLET, FILM COATED ORAL at 17:08

## 2022-12-09 RX ADMIN — HEPARIN SODIUM 5000 UNITS: 5000 INJECTION INTRAVENOUS; SUBCUTANEOUS at 17:06

## 2022-12-09 RX ADMIN — OXYCODONE HYDROCHLORIDE AND ACETAMINOPHEN 1 TABLET: 5; 325 TABLET ORAL at 17:43

## 2022-12-09 RX ADMIN — HEPARIN SODIUM 5000 UNITS: 5000 INJECTION INTRAVENOUS; SUBCUTANEOUS at 22:15

## 2022-12-09 RX ADMIN — SODIUM CHLORIDE, PRESERVATIVE FREE 10 ML: 5 INJECTION INTRAVENOUS at 22:15

## 2022-12-09 RX ADMIN — NYSTATIN: 100000 POWDER TOPICAL at 19:07

## 2022-12-09 RX ADMIN — METOPROLOL TARTRATE 25 MG: 25 TABLET, FILM COATED ORAL at 12:49

## 2022-12-09 RX ADMIN — Medication 5 MG: at 22:15

## 2022-12-09 NOTE — PROGRESS NOTES
MRI Screening form needs to be filled out and faxed to [phone] MRI can be scheduled. If unable to obtain information from pt, [facility] needs to be contacted.  If pt is claustro or will need pain meds, please have ordered in advance in order to facilitate exam.

## 2022-12-09 NOTE — ED NOTES
Assumed care of patient. Received report from [facility]. Patient resting on back and assisted patient to turn to left side.

## 2022-12-09 NOTE — CONSULTS
Consult requested by: Suzi Posada is a 81 y.o. female BLACK/ who is being seen on consult for esrd        HPI:      73 yr old with hx of  ESRD,,diabetes,htn,cardiomyopathy, presents in to ED for weakness and legs giving out. Also intermittently dizzy following dialysis. Found to have posterior circulation stenosis on CTA neck. Admitted for r/o stroke. Complaints of cramping during dialysis sessions    Goes to dialysis at [facility]  Last dialysis was on Tuesday. CTA head and neck reveals-  IMPRESSION  CTA HEAD:  1. No large vessel occlusion identified. 2.  Anatomic variant JUAN RAMON, nondominant left vertebral artery as detailed above. CTA NECK:  1. Patent cervical carotid arteries with 20-30% origin stenosis of the ICA. 2.  Severe segmental stenosis of the left vertebral artery areas of string-like  appearance. 3.  Severe origin stenosis right vertebral artery. Past Medical History:   Diagnosis Date    Abnormal WBC count 5/17/2016    Anemia     Bilateral shoulder pain 9/27/2016    Chondromalacia of both patellae     COPD (chronic obstructive pulmonary disease) ([facility] [address] 9/2015    mild-mod dz; Dr Ruiz Edwards    Diabetes [facility]) 2013    Diabetic eye exam ([facility] [address] 2016    Dilated cardiomyopathy ([address]     Dyslipidemia     Gout     Heart attack ([facility] [address] 10/18/2019    History of echocardiogram 11/14/2014    Mild LVE. EF 40%. Mild, diffuse hypk. Mild LAE. Mild MR.       Hypercholesteremia 1/08/14    Hypertension 2000    Noncompliance with medications 2/16/2016    Obesity     Orthostatic hypotension 5/17/2016    Osteoarthritis of both knees     Pain management 04/01/2016    Dr. Sasha Baca     Popliteal cyst, bilateral      Stage 3b chronic kidney disease ([address] 8/8/2022    Vitamin D deficiency 10/16/14      Past Surgical History:   Procedure Laterality Date    HX HEART CATHETERIZATION      HX TUBAL LIGATION      IR INSERT TUNL CVC W/O PORT OVER 5 YR  9/21/2022 Social History     Socioeconomic History    Marital status:      Spouse name: Not on file    Number of children: 3    Years of education: Not on file    Highest education level: Not on file   Occupational History    Occupation: retired   Tobacco Use    Smoking status: Former     Packs/day: 1.00     Years: 52.00     Pack years: 52.00     Types: Cigarettes     Quit date: 5/20/2019     Years since quitting: 3.5    Smokeless tobacco: Never   Substance and Sexual Activity    Alcohol use: No     Alcohol/week: 0.0 standard drinks    Drug use: No    Sexual activity: Yes     Partners: Male   Other Topics Concern     Service No    Blood Transfusions No    Caffeine Concern No    Occupational Exposure No    Hobby Hazards No    Sleep Concern No    Stress Concern No    Weight Concern No    Special Diet No    Back Care No    Exercise No    Bike Helmet No    Seat Belt Yes    Self-Exams Yes   Social History Narrative    Not on file     Social Determinants of Health     Financial Resource Strain: Not on file   Food Insecurity: Not on file   Transportation Needs: Not on file   Physical Activity: Not on file   Stress: Not on file   Social Connections: Not on file   Intimate Partner Violence: Not on file   Housing Stability: Not on file       Family History   Problem Relation Age of Onset    Diabetes Mother     Hypertension Mother     Hypertension Father     Kidney Disease Maternal Aunt 50        Dialysis     No Known Allergies     Home Medications:     Prior to Admission Medications   Prescriptions Last Dose Informant Patient Reported? Taking? albuterol-ipratropium (DUO-NEB) 2.5 mg-0.5 mg/3 ml nebu 12/8/2022  No Yes   Sig: 3 mL by Nebulization route every six (6) hours as needed for Wheezing. aspirin 81 mg chewable tablet 12/8/2022  No Yes   Sig: Take 2 Tabs by mouth daily. gabapentin (NEURONTIN) 400 mg capsule 12/8/2022  Yes Yes   Sig: Take 400 mg by mouth in the morning.    hydrALAZINE (APRESOLINE) 100 mg tablet 12/2/2022  Yes Yes   Sig: Take 100 mg by mouth two (2) times a day. insulin detemir U-100 (Levemir U-100 Insulin) 100 unit/mL injection 12/8/2022  No Yes   Sig: 15 units daily for diabetes once daily with lunch   metoprolol tartrate (LOPRESSOR) 25 mg tablet 12/8/2022  No Yes   Sig: Take 1 Tablet by mouth two (2) times a day. tiZANidine (ZANAFLEX) 4 mg tablet 12/8/2022  Yes Yes   Sig: Take 4 mg by mouth two (2) times daily as needed. umeclidinium-vilanterol (ANORO ELLIPTA) 62.5-25 mcg/actuation inhaler 12/8/2022  No Yes   Sig: Take 1 Puff by inhalation daily. For COPD      Facility-Administered Medications: None       Current Facility-Administered Medications   Medication Dose Route Frequency    albuterol-ipratropium (DUO-NEB) 2.5 MG-0.5 MG/3 ML  3 mL Nebulization Q6H PRN    [START ON 12/10/2022] gabapentin (NEURONTIN) capsule 400 mg  400 mg Oral DAILY    metoprolol tartrate (LOPRESSOR) tablet 25 mg  25 mg Oral BID    hydrALAZINE (APRESOLINE) tablet 100 mg  100 mg Oral BID    sodium chloride (NS) flush 5-40 mL  5-40 mL IntraVENous Q8H    sodium chloride (NS) flush 5-40 mL  5-40 mL IntraVENous PRN    acetaminophen (TYLENOL) tablet 650 mg  650 mg Oral Q6H PRN    Or    acetaminophen (TYLENOL) suppository 650 mg  650 mg Rectal Q6H PRN    polyethylene glycol (MIRALAX) packet 17 g  17 g Oral DAILY PRN    ondansetron (ZOFRAN ODT) tablet 4 mg  4 mg Oral Q8H PRN    Or    ondansetron (ZOFRAN) injection 4 mg  4 mg IntraVENous Q6H PRN    heparin (porcine) injection 5,000 Units  5,000 Units SubCUTAneous Q8H    glucose chewable tablet 16 g  4 Tablet Oral PRN    glucagon (GLUCAGEN) injection 1 mg  1 mg IntraMUSCular PRN    dextrose 10% infusion 0-250 mL  0-250 mL IntraVENous PRN    insulin lispro (HUMALOG) injection   SubCUTAneous AC&HS       Review of Systems:      Complete 10-point review of systems were obtained and discussed in length  with the patient.  Complete review of systems was negative/unremarkable  except as mentioned in HPI section. Data Review:    Labs: Results:       Chemistry Recent Labs     12/09/22  1248 12/08/22  1225   * 282*    134*   K 4.3 4.3    98*   CO2 26 28   BUN 33* 36*   CREA 2.22* 3.45*   CA 9.6 9.3   AGAP 8 8   BUCR 15 10*   * 129*   TP 6.5 6.7   ALB 3.3* 3.5   GLOB 3.2 3.2   AGRAT 1.0 1.1      CBC w/Diff Recent Labs     12/09/22  1248 12/08/22  1225   WBC 5.0 5.2   RBC 3.57* 3.67*   HGB 10.1* 10.4*   HCT 33.2* 34.8*    257   GRANS 53 53   LYMPH 32 36   EOS 3 1      Coagulation No results for input(s): PTP, INR, APTT, INREXT in the last 72 hours. Iron/Ferritin No results for input(s): IRON in the last 72 hours. No lab exists for component: TIBCCALC   BNP No results for input(s): BNPP in the last 72 hours. Cardiac Enzymes No results for input(s): CPK, CKND1, GODFREY in the last 72 hours. No lab exists for component: CKRMB, TROIP   Liver Enzymes Recent Labs     12/09/22  1248   TP 6.5   ALB 3.3*   *      Thyroid Studies Lab Results   Component Value Date/Time    TSH 1.29 10/08/2022 05:15 PM            Physical Assessment:   Visit Vitals  BP (!) 140/57 (BP 1 Location: Right upper arm, BP Patient Position: Lying;Supine; At rest)   Pulse 94   Temp 98.1 °F (36.7 °C)   Resp 18   Ht 5' 4\" (1.626 m)   Wt 99.8 kg (220 lb)   SpO2 94%   BMI 37.76 kg/m²     Weight change:   No intake or output data in the 24 hours ending 12/09/22 1413  Physical Exam  General: comfortable, no acute distress   HEENT sclera anicteric, supple neck, no thyromegaly  CVS: S1S2 heard,  no rub  RS: + air entry b/l,   Abd: Soft, Non tender, Not distended, Positive bowel sounds, no organomegaly, no CVA / supra pubic tenderness  Neuro: non focal, awake, alert , CN II-XII are grossly intact  Extrm: no edema, no cyanosis, clubbing   Skin: no visible  Rash  Musculoskeletal: No gross joints or bone deformities   Access; [facility]  Procedures/imaging: see electronic medical records for all procedures, Xrays and details which were not copied into this note but were reviewed prior to creation of Plan      Impression & Plan:   IMPRESSION:   ESRD on HD TTS  Dizziness,lower ext weakness r/o PCA stroke  Vertebral artery stenosis on CTA neck  Cardiomyopathy, CAD  Secondary hyperparathyroidism  ACD   PLAN:   Get phos level and PTH  Get iron levels and ferritin  Hold epogen for now  HD tomorrow again  If patient need IV contrast for CT imaging, need to be coordinated with renal team.   Avoid Gadolinium due to its association with nephrogenic systemic fibrosis in a patients with severe ARF and ESRD. Please dose all medications for creatinine clearance <15/dialysis. Avoid blood pressure checks, blood draws, peripheral iv's on arm with access. AvoidPICC lines on either arm in order to preserve veins for dialysis access creation.              Patt Landau, MD  December 9, 2022  [facility]  Office [phone]

## 2022-12-09 NOTE — H&P
History & Physical    Patient: Pako Slade MRN: [id number]  CSN: [id number]    YOB: 1950  Age: 81 y.o. Sex: female      DOA: 12/8/2022    Chief Complaint: dizziness        HPI:     Pako Slade is a 81 y.o.  female with hx of ESRD with newly started hemodialysis approximately 10 weeks ago, chronic diastolic congestive heart failure, moderate COPD, obesity, type 2 diabetes mellitus. Patient presented from [facility] emergency department earlier this afternoon. Patient stated that she has been having dizziness and intermittent weakness for the past several weeks. She states she will get dizzy upon standing and her knees will give out. Recently her knees gave out and she had to sit on the floor for several hours before she was able to get herself up. Weakness is bilateral and involves upper and lower extremities. Patient's initial work-up in [facility] ED included CT which was negative for acute stroke. CTA head and neck with severe bilateral vertebral artery stenosis, to 30% bilateral cervical carotid artery stenosis. Past Medical History:   Diagnosis Date    Abnormal WBC count 5/17/2016    Anemia     Bilateral shoulder pain 9/27/2016    Chondromalacia of both patellae     COPD (chronic obstructive pulmonary disease) ([address] 9/2015    mild-mod dz; Dr Richard Grossman    Diabetes [facility]) 2013    Diabetic eye exam ([facility] 2016    Dilated cardiomyopathy ([address]     Dyslipidemia     Gout     Heart attack ([facility] [address] 10/18/2019    History of echocardiogram 11/14/2014    Mild LVE. EF 40%. Mild, diffuse hypk. Mild LAE. Mild MR.       Hypercholesteremia 1/08/14    Hypertension 2000    Noncompliance with medications 2/16/2016    Obesity     Orthostatic hypotension 5/17/2016    Osteoarthritis of both knees     Pain management 04/01/2016    Dr. Mikayla Resendiz     Popliteal cyst, bilateral      Stage 3b chronic kidney disease ([address] 8/8/2022    Vitamin D deficiency 10/16/14       Past Surgical History:   Procedure Laterality Date    HX HEART CATHETERIZATION      HX TUBAL LIGATION      IR INSERT TUNL CVC W/O PORT OVER 5 YR  9/21/2022       Family History   Problem Relation Age of Onset    Diabetes Mother     Hypertension Mother     Hypertension Father     Kidney Disease Maternal Aunt 48        Dialysis       Social History     Socioeconomic History    Marital status:     Number of children: 3   Occupational History    Occupation: retired   Tobacco Use    Smoking status: Former     Packs/day: 1.00     Years: 52.00     Pack years: 52.00     Types: Cigarettes     Quit date: 5/20/2019     Years since quitting: 3.5    Smokeless tobacco: Never   Substance and Sexual Activity    Alcohol use: No     Alcohol/week: 0.0 standard drinks    Drug use: No    Sexual activity: Yes     Partners: Male   Other Topics Concern     Service No    Blood Transfusions No    Caffeine Concern No    Occupational Exposure No    Hobby Hazards No    Sleep Concern No    Stress Concern No    Weight Concern No    Special Diet No    Back Care No    Exercise No    Bike Helmet No    Seat Belt Yes    Self-Exams Yes       Prior to Admission medications    Medication Sig Start Date End Date Taking? Authorizing Provider   hydrALAZINE (APRESOLINE) 100 mg tablet Take 100 mg by mouth two (2) times a day. 10/31/22  Yes Provider, Historical   tiZANidine (ZANAFLEX) 4 mg tablet Take 4 mg by mouth two (2) times daily as needed. 11/14/22  Yes Provider, Historical   metoprolol tartrate (LOPRESSOR) 25 mg tablet Take 1 Tablet by mouth two (2) times a day. 10/8/22  Yes Papa Shaikh MD   insulin detemir U-100 (Levemir U-100 Insulin) 100 unit/mL injection 15 units daily for diabetes once daily with lunch 9/22/22  Yes Angie Baca MD   gabapentin (NEURONTIN) 400 mg capsule Take 400 mg by mouth in the morning.    Yes Morales, MD Tg   albuterol-ipratropium (DUO-NEB) 2.5 mg-0.5 mg/3 ml nebu 3 mL by Nebulization route every six (6) hours as needed for Wheezing. 3/23/21  Yes Jailyn Hernandez MD   aspirin 81 mg chewable tablet Take 2 Tabs by mouth daily. 19  Yes Kendra Rabago, SONJA   umeclidinium-vilanterol (ANORO ELLIPTA) 62.5-25 mcg/actuation inhaler Take 1 Puff by inhalation daily. For COPD 17  Yes [place], Vicki Lanius, [address]       No Known Allergies      Review of Systems  GENERAL: No fever, chills, malaise, weight changes  HEENT: No change in vision, no earache, tinnitus, sore throat or sinus congestion. NECK: No pain or stiffness. PULMONARY: No shortness of breath, cough or wheeze. Cardiovascular: no pnd or orthopnea, no CP  GASTROINTESTINAL: No abdominal pain, nausea, vomiting or diarrhea, melena or bright red blood per rectum. GENITOURINARY: No urinary frequency, urgency, hesitancy or dysuria. MUSCULOSKELETAL: No joint or muscle pain, no back pain, no recent trauma. DERMATOLOGIC: No rash, no itching, no lesions. ENDOCRINE: No polyuria, polydipsia, no heat or cold intolerance. No recent change in weight. HEMATOLOGICAL: No anemia or easy bruising or bleeding. NEUROLOGIC: No headache, seizures, numbness, tingling or weakness. Physical Exam:     Physical Exam:  Visit Vitals  BP (!) 140/57 (BP 1 Location: Right upper arm, BP Patient Position: Lying;Supine; At rest)   Pulse 94   Temp 98.1 °F (36.7 °C)   Resp 18   Ht 5' 4\" (1.626 m)   Wt 97.5 kg (215 lb)   SpO2 94%   BMI 36.90 kg/m²      O2 Device: None (Room air)    Temp (24hrs), Av.1 °F (36.7 °C), Min:98.1 °F (36.7 °C), Max:98.1 °F (36.7 °C)    No intake/output data recorded. No intake/output data recorded. General:  Alert, cooperative, no distress, appears stated age. Head: Normocephalic, without obvious abnormality, atraumatic. Eyes:  Conjunctivae/corneas clear. PERRL, EOMs intact. Nose: Nares normal. No drainage or sinus tenderness. Neck:  Right chest tunneled dialysis catheter. Lungs:   Clear to auscultation bilaterally. Heart:  Regular rate and rhythm, S1, S2 normal.     Abdomen: Soft, non-tender. Bowel sounds normal.    Extremities: Extremities normal, atraumatic, no cyanosis or edema. Pulses: 2+ and symmetric all extremities. Skin:  No rashes or lesions   Neurologic: AAOx3, No focal motor or sensory deficit. Strength bilateral upper and lower extremities 4/5. Labs Reviewed: All lab results for the last 24 hours reviewed. Recent Results (from the past 24 hour(s))   HEMOGLOBIN A1C WITH EAG    Collection Time: 12/09/22 12:48 PM   Result Value Ref Range    Hemoglobin A1c 6.7 (H) 4.2 - 5.6 %    Est. average glucose 370 mg/dL   METABOLIC PANEL, COMPREHENSIVE    Collection Time: 12/09/22 12:48 PM   Result Value Ref Range    Sodium 137 136 - 145 mmol/L    Potassium 4.3 3.5 - 5.5 mmol/L    Chloride 103 100 - 111 mmol/L    CO2 26 21 - 32 mmol/L    Anion gap 8 3.0 - 18 mmol/L    Glucose 137 (H) 74 - 99 mg/dL    BUN 33 (H) 7.0 - 18 MG/DL    Creatinine 2.22 (H) 0.6 - 1.3 MG/DL    BUN/Creatinine ratio 15 12 - 20      eGFR 23 (L) >60 ml/min/1.73m2    Calcium 9.6 8.5 - 10.1 MG/DL    Bilirubin, total 0.4 0.2 - 1.0 MG/DL    ALT (SGPT) 21 13 - 56 U/L    AST (SGOT) 24 10 - 38 U/L    Alk.  phosphatase 119 (H) 45 - 117 U/L    Protein, total 6.5 6.4 - 8.2 g/dL    Albumin 3.3 (L) 3.4 - 5.0 g/dL    Globulin 3.2 2.0 - 4.0 g/dL    A-G Ratio 1.0 0.8 - 1.7     CBC WITH AUTOMATED DIFF    Collection Time: 12/09/22 12:48 PM   Result Value Ref Range    WBC 5.0 4.6 - 13.2 K/uL    RBC 3.57 (L) 4.20 - 5.30 M/uL    HGB 10.1 (L) 12.0 - 16.0 g/dL    HCT 33.2 (L) 35.0 - 45.0 %    MCV 93.0 78.0 - 100.0 FL    MCH 28.3 24.0 - 34.0 PG    MCHC 30.4 (L) 31.0 - 37.0 g/dL    RDW 16.8 (H) 11.6 - 14.5 %    PLATELET 402 905 - 197 K/uL    MPV 9.5 9.2 - 11.8 FL    NRBC 3.0 (H) 0  WBC    ABSOLUTE NRBC 0.15 (H) 0.00 - 0.01 K/uL    NEUTROPHILS 53 40 - 73 %    LYMPHOCYTES 32 21 - 52 %    MONOCYTES 12 (H) 3 - 10 %    EOSINOPHILS 3 0 - 5 %    BASOPHILS 0 0 - 2 % IMMATURE GRANULOCYTES 1 (H) 0.0 - 0.5 %    ABS. NEUTROPHILS 2.6 1.8 - 8.0 K/UL    ABS. LYMPHOCYTES 1.6 0.9 - 3.6 K/UL    ABS. MONOCYTES 0.6 0.05 - 1.2 K/UL    ABS. EOSINOPHILS 0.1 0.0 - 0.4 K/UL    ABS. BASOPHILS 0.0 0.0 - 0.1 K/UL    ABS. IMM. GRANS. 0.1 (H) 0.00 - 0.04 K/UL    DF AUTOMATED     GLUCOSE, POC    Collection Time: 12/09/22  1:26 PM   Result Value Ref Range    Glucose (POC) 143 (H) 70 - 110 mg/dL   GLUCOSE, POC    Collection Time: 12/09/22  4:48 PM   Result Value Ref Range    Glucose (POC) 193 (H) 70 - 110 mg/dL        XR Results (most recent):  Results from [place] encounter on 12/08/22    XR CHEST PA LAT    Narrative  EXAM: XR CHEST PA LAT    CLINICAL INDICATION/HISTORY: 81 years Female. weakness. Additional History: None    TECHNIQUE: Frontal and lateral views of the chest    COMPARISON: 10/8/2020    FINDINGS:    Chronic silhouette is mildly enlarged, similar in size to prior. Right sided  dialysis catheter with tip at the cavoatrial junction level. Mild diffuse  prominence of interstitial markings, similar compared to prior. The cardiac silhouette appears within normal limits. Aortic calcifications  noted. Pulmonary vasculature appears within normal limits. No confluent airspace  opacity is appreciated. No evidence of pleural effusion or pneumothorax. No acute osseous abnormality appreciated. Impression  Similar mild diffuse prominence of interstitial markings which may reflect  interstitial edema or chronic lung disease. CT Results  (Last 48 hours)                 12/08/22 1548  CTA HEAD NECK W CONT Final result    Impression:  CTA HEAD:   1. No large vessel occlusion identified. 2.  Anatomic variant JUAN RAMON, nondominant left vertebral artery as detailed above. CTA NECK:   1. Patent cervical carotid arteries with 20-30% origin stenosis of the ICA. 2.  Severe segmental stenosis of the left vertebral artery areas of string-like   appearance.    3.  Severe origin stenosis right vertebral artery. Preliminary report provided at 1630. Narrative:      NECK:   1. Bilateral ICA origin atherosclerotic disease with less than 50% stenosis. 2.  Severe right vertebral artery origin stenosis. 3.  Portions of the left vertebral artery appears string like with poorly   visualized V1 segment, likely long segment severe stenotic; multifocal stenosis   of the V2 segment. EXAM: CTA HEAD NECK W CONT        CLINICAL INDICATIONS: CVA? ?- dialysis on friday   Body pain, lower extremity weakness, dizziness with standing       TECHNIQUE: Helical CT scan of the brain and neck were performed during rapid IV   bolus contrast administration. These data were reconstructed at .625 mm   intervals for vascular analysis. The data was also reviewed at 2.5 mm intervals   for accompanying soft tissue analysis. 3D post processed images, including   surface shaded displays, were produced for this exam on independent console,   permanently archived and interpreted. All CT scans at this facility are performed using dose optimization technique as   appropriate to a performed exam, to include automated exposure control,   adjustment of the MA and/or kV according to patient size (including appropriate   matching for site-specific examinations) or use of  iterative reconstruction   technique. CONTRAST: 80 cc Isovue 370       COMPARISON: Concurrent CT head       CTA NECK VASCULAR ANALYSIS:        Aortic arch: Left sided with typical configuration. Bilateral subclavian artery   are patent with mild right stenosis. Extensive left subclavian artery is   incompletely visualized secondary to dense contrast in the subclavian vein. Right carotid:   -CCA: Patent   -ECA: Origin is patent   -ICA: Atherosclerotic plaque at the origin resulting in approximately 20%   stenosis of proximal ICA by NASCET criteria.        Left carotid:   -CCA: Patent   -ECA: Origin is patent   -ICA: Atherosclerotic plaque at the origin results in approximately 30% stenosis   of proximal ICA by NASCET criteria. Right vertebral: Severe stenosis at the origin, moderate stenosis of the V1   segment. Mild atherosclerotic calcification of the proximal V2 segment otherwise   patent to the skull base. Left vertebral: Severe stenosis or segmental occlusion of the proximal V1   segment. String-like appearance of portions of the V2 segment with multifocal   moderate to severe stenoses. CTA HEAD VASCULAR ANALYSIS:        Anterior circulation:   -ICA: Atherosclerotic calcification of the bilateral cavernous, paraclinoid and   supraclinoid ICA with mild stenosis. -JUAN RAMON: Likely anatomic variant right AICA supplying the frontal polar and   anterior frontal lobe. Dominant left JUAN RAMON appears patent. -MCA: Patent bilaterally. Mild left MCA, M2 stenosis proximally   -AComm: Anatomic variant noted above, not visualized. -PComm: Patent on the right is dominant supply the posterior vertebral artery. Hypoplastic, diminutive on the left. Posterior circulation:   -Vertebral: Diminutive but patent on the left with V4 stenosis. -Basilar: Diminutive but patent. -Superior cerebellar: Bilateral origins are patent, diminutive left SCA. -PCA: Dominant supply of the right PCA via the posterior communicating artery. Mild stenosis of the right P2 segment. Moderate stenosis of the left proximal P2   segment. .       Major dural venous sinuses: Unremarkable       CTA SOFT TISSUE ANALYSIS:   Lungs: Mild increase in interstitial markings bilateral lung apices. Upper chest: Right chest tunneled dialysis catheter into the right IJ. Neck: Unremarkable   Lymph nodes: No significant lymphadenopathy. Orbits: Bilateral ocular lens replacements. Paranasal sinuses: Clear   Brain: No hemorrhage or mass effect. Bones: Multilevel degenerative disc disease, facet arthropathy.  Large anterior   osteophytosis from C1-C7 12/08/22 1546  CT HEAD WO CONT Final result    Impression:      No acute findings. Chronic microvascular disease. Stable small chronic left frontal lobe infarct. Narrative:  CT OF THE HEAD WITHOUT CONTRAST. CLINICAL HISTORY: Dizziness. Dialysis patient. TECHNIQUE: Helical scan obtained of the head were obtained from the skull vertex   through the base of the skull without intravenous contrast.    All CT scans are   performed using dose optimization techniques as appropriate to the performed   exam including the following: Automated exposure control, adjustment of mA   and/or kV according to patient size, and use of iterative reconstructive   technique. COMPARISON: 9/19/2022. FINDINGS:        The sulcal pattern and ventricular system are normal in size and configuration   for age. No intracranial hemorrhage. Periventricular and deep white matter   hypodensities are not significantly changed. Stable small chronic infarct in the   left frontal lobe. The visualized paranasal sinuses are clear. The mastoid air   cells are clear. The visualized bony structures are unremarkable. 12/08/22 1331  CT SPINE LUMB WO CONT Final result    Impression:  No acute findings. Mild degenerative disc disease. Multilevel lumbar facet hypertrophy, greatest at right L4-L5. Stable Grade I spondylolisthesis at L4-L5 and L5-S1. Narrative:  CT Lumbar Spine Without Contrast            HISTORY: Bilateral lower extremity weakness. .       COMPARISON: CT abdomen and pelvis 6/28/2022. TECHNIQUE: 2.5 contiguous helically acquired axial images with sagittal and   coronal reformations for better evaluation of vertebral alignment,   intervertebral disc space height and interfacet relations.   All CT scans are   performed using dose optimization techniques as appropriate to the performed   exam including the following: Automated exposure control, adjustment of mA   and/or kV according to patient size, and use of iterative reconstructive   technique. FINDINGS:       No acute fracture. No suspicious bone lesions. Multilevel mild vertebral   osteophytes. The disc spaces are maintained. Stable minimal grade I   anterolisthesis at L4-L5 and minimal grade I retrolisthesis at L5-S1. Multilevel   bilateral facet hypertrophy, greatest at right L4-L5. Aortic wall calcifications   without evidence of AAA. Partial imaging of the fibroid uterus better seen on   the prior abdomen study       T12-L1: Widely patent spinal canal and neural foramen. L1-L2: Minimal disc bulge. Widely patent spinal canal and neural foramen. L2-L3: Small disc bulge. Widely patent spinal canal and neural foramen. L3-L4: Minimal disc bulge. Widely patent spinal canal and neural foramen. L4-L5: Minimal anterolisthesis. Mild disc bulge. While the patent spinal canal   with mild foraminal narrowing. Severe right facet hypertrophy. L5-S1: Minimal retrolisthesis. Mild broad-based central disc protrusion. Widely   patent spinal canal. Mildly narrowed left greater than right foramen. Procedures/imaging: see electronic medical records for all procedures/Xrays and details which were not copied into this note but were reviewed prior to creation of Plan      Assessment/Plan     Active Problems:    Essential hypertension (9/17/2015)      Chronic pain syndrome (4/13/2016)      Type 2 diabetes with nephropathy ([address] (7/12/2018)      CAD (coronary artery disease) (10/18/2019)      Overview: Added automatically from request for surgery [id number]      ESRD (end stage renal disease) on dialysis ([facility] [address] (12/8/2022)      Dizziness (12/9/2022)      Stenosis of both vertebral arteries (12/9/2022)     Assessment  Dizziness without presyncope -patient states she becomes dizzy when she attempts to stand from sitting position. Initially started a stroke rule out in ED.   CT head without acute stroke. Stable small chronic left frontal lobe infarct. Patient states she is aware of old stroke. Bilateral vertebral artery stenosis  Bilateral ICA origin atherosclerotic disease with less than 50% stenotic   #1-3. Admit to telemetry. MRI without contrast.  Vascular surgery (Dr. Isabel Wells) consult concerning stenosis. Orthostatic blood pressures ordered. ESRD on hemodialysis-hemodialysis newly started approximately 10 weeks ago. Patient with right chest wall tunneled dialysis catheter. Continues to make urine. #4. Nephrology-Dr. Lexi Hameed consulted. Dialysis tomorrow. Type 2 diabetes mellitus, insulin-dependent, with hyperglycemia   #5. Hold home meds. SSI. Anemia of ESRD  #6. Per nephro - Epo to be held tomorrow. Iron, ferritin levels pending. HTN   Chronic diastolic congestive heart failure - currently does not look to be in exacerbation. #7-8. Continue home meds. Fluid management per nephro. Plan  For all above:   Monitor vital signs, weight per unit protocol  PT, OT eval and treat  Fall, aspiration precautions  Consult CM to assist w/ dc planning      Diet: carb reduced  DVT/GI Prophylaxis: Hep SQ  Code Status: FULL    Contact:    Discussed with patient at bedside about hospital admission and plan care. He understands and agrees. All questions answered. Disclaimer: Sections of this note are dictated using utilizing voice recognition software. Minor typographical errors may be present. If questions arise, please do not hesitate to contact me or call our department.         Ivonne Benavidez, DO  [facility] OF THE [facility]

## 2022-12-09 NOTE — DIABETES MGMT
GLYCEMIC CONTROL SCREENING INITIATED:    Lab Results   Component Value Date/Time    Hemoglobin A1c 7.2 (H) 06/29/2022 03:12 AM      Lab Results   Component Value Date/Time    Glucose 282 (H) 12/08/2022 12:25 PM         [x]  Glucose values exceeding inpatient target range: -180mg/dl            non-ICU 70-180mg/dl      [x]  Patient meets criteria for diabetes HbA1c ? 6.5%      []  Recommend basal insulin per IP Insulin order set. [x]  Recommend corrective insulin per IP Insulin order set. [x]  Standard insulin scale  i[]  Very insuln resistant scale       [x]  Recommend carbohydrate controlled diabetic diet.       [x] Other: recommend updated A1C

## 2022-12-09 NOTE — ED NOTES
TRANSFER - OUT REPORT:    Verbal report given to [facility] YINA DON on Cipriano Shaffer  being transferred to [address] [place] [address]  for routine progression of care       Report consisted of patients Situation, Background, Assessment and   Recommendations(SBAR). Information from the following report(s) SBAR, ED Summary, and MAR was reviewed with the receiving nurse. Lines:   Peripheral IV 12/08/22 Anterior;Right Forearm (Active)        Opportunity for questions and clarification was provided.       Patient transported with:   Monitor

## 2022-12-09 NOTE — PROGRESS NOTES
Problem: Mobility Impaired (Adult and Pediatric)  Goal: *Acute Goals and Plan of Care (Insert Text)  Description: Physical Therapy Goals  Initiated 12/9/2022 and to be accomplished within 7 day(s)  1. Patient will move from supine to sit and sit to supine  in bed with modified independence. 2.  Patient will transfer from bed to chair and chair to bed with modified independence using the least restrictive device. 3.  Patient will perform sit to stand with modified independence. 4.  Patient will ambulate with modified independence for 150 feet with the least restrictive device. 5.  Patient will ascend/descend 4 stairs with handrail(s) with supervision/set-up. PLOF: Patient was independent ambulating without AD PTA. She lives with spouse in single story home. Outcome: Progressing Towards Goal     PHYSICAL THERAPY EVALUATION    Patient: Mohit Panchal (14 y.o. female)  Date: 12/9/2022  Primary Diagnosis: CVA (cerebral vascular accident) ([facility] [I63.9]  ESRD (end stage renal disease) on dialysis ([facility] [N18.6, Z99.2]  TIA (transient ischemic attack) [G45.9]       Precautions:   Fall      ASSESSMENT :  Based on the objective data described below, the patient presents with decreased strength, dizziness, and decreased mobility tolerance. Patient reports multiple episodes of legs giving out. CGA supine to sit transfer. She c/o dizziness. LE strength grossly 4/5. Patient stands up with CGA and RW for support. She remains dizzy. Orthostatic BP. Patient declines to ambulate or take side steps along EOB. She sat back down and scoots laterally with SBA towards HOB. Pt resting comfortably in bed with call bell within reach. Sitting BP:  131/74  Standing BP:116/71  Supine BP:  132/80    Patient will benefit from skilled intervention to address the above impairments.   Patient's rehabilitation potential is considered to be Good  Factors which may influence rehabilitation potential include:   [x]         None noted  []         Mental ability/status  []         Medical condition  []         Home/family situation and support systems  []         Safety awareness  []         Pain tolerance/management  []         Other:      PLAN :  Recommendations and Planned Interventions:   [x]           Bed Mobility Training             [x]    Neuromuscular Re-Education  [x]           Transfer Training                   []    Orthotic/Prosthetic Training  [x]           Gait Training                          []    Modalities  [x]           Therapeutic Exercises           []    Edema Management/Control  [x]           Therapeutic Activities            [x]    Family Training/Education  [x]           Patient Education  []           Other (comment):    Frequency/Duration: Patient will be followed by physical therapy 1-2 times per day/4-7 days per week to address goals. Further Equipment Recommendations for Discharge: rolling walker    AMPAC:   Current research shows that an AM-PAC score of 17 (13 without stairs) or less is not associated with a discharge to the patient's home setting. Based on an AM-PAC score of 17/24 (or **/20 if omitting stairs) and their current functional mobility deficits, it is recommended that the patient have 5-7 sessions per week of Physical Therapy at d/c to increase the patient's independence. Currently, this patient demonstrates the potential endurance, and/or tolerance for 3 hours of therapy each day at d/c. This AMPAC score should be considered in conjunction with interdisciplinary team recommendations to determine the most appropriate discharge setting. Patient's social support, diagnosis, medical stability, and prior level of function should also be taken into consideration. SUBJECTIVE:   Patient stated Domenica Hamman told me I could rest today.     OBJECTIVE DATA SUMMARY:     Past Medical History:   Diagnosis Date    Abnormal WBC count 5/17/2016    Anemia     Bilateral shoulder pain 9/27/2016 Chondromalacia of both patellae     COPD (chronic obstructive pulmonary disease) ([facility] 9/2015    mild-mod dz; Dr Pramod Beal    Diabetes [facility]) 2013    Diabetic eye exam ([facility] 2016    Dilated cardiomyopathy ([facility]     Dyslipidemia     Gout     Heart attack ([facility] 10/18/2019    History of echocardiogram 11/14/2014    Mild LVE. EF 40%. Mild, diffuse hypk. Mild LAE. Mild MR. Hypercholesteremia 1/08/14    Hypertension 2000    Noncompliance with medications 2/16/2016    Obesity     Orthostatic hypotension 5/17/2016    Osteoarthritis of both knees     Pain management 04/01/2016    Dr. Whitaker Dose     Popliteal cyst, bilateral      Stage 3b chronic kidney disease ([facility] 8/8/2022    Vitamin D deficiency 10/16/14     Past Surgical History:   Procedure Laterality Date    HX HEART CATHETERIZATION      HX TUBAL LIGATION      IR INSERT TUNL CVC W/O PORT OVER 5 YR  9/21/2022     Barriers to Learning/Limitations: None  Compensate with: N/A  Home Situation:  Home Situation  # Steps to Enter: 4  Rails to Enter: Yes  One/Two Story Residence: One story  Living Alone: No  Support Systems: Spouse/Significant Other  Current DME Used/Available at Home: Shower chair  Critical Behavior:              Psychosocial  Patient Behaviors: Calm; Cooperative                 Strength:    Strength: Generally decreased, functional                    Tone & Sensation:   Tone: Normal              Sensation: Intact               Range Of Motion:  AROM: Within functional limits                 Functional Mobility:  Bed Mobility:     Supine to Sit: Contact guard assistance  Sit to Supine: Stand-by assistance     Transfers:  Sit to Stand: Contact guard assistance  Stand to Sit: Contact guard assistance              Balance:   Sitting: Intact  Standing: Impaired; With support            Pain:  Pain level pre-treatment: 0/10   Pain level post-treatment: 0/10   Pain Intervention(s) : Medication (see MAR);  Rest, Ice, Repositioning  Response to intervention: Nurse notified, See doc flow    Activity Tolerance:   FAir  Please refer to the flowsheet for vital signs taken during this treatment. After treatment:   []         Patient left in no apparent distress sitting up in chair  [x]         Patient left in no apparent distress in bed  [x]         Call bell left within reach  [x]         Nursing notified  []         Caregiver present  [x]         Bed alarm activated  []         SCDs applied    COMMUNICATION/EDUCATION:   [x]         Role of Physical Therapy in the acute care setting. [x]         Fall prevention education was provided and the patient/caregiver indicated understanding. [x]         Patient/family have participated as able in goal setting and plan of care. [x]         Patient/family agree to work toward stated goals and plan of care. []         Patient understands intent and goals of therapy, but is neutral about his/her participation. []         Patient is unable to participate in goal setting/plan of care: ongoing with therapy staff.  []         Other:     Thank you for this referral.  James Babb, PT   Time Calculation: 21 mins      Eval Complexity: History: MEDIUM  Complexity : 1-2 comorbidities / personal factors will impact the outcome/ POC Exam:MEDIUM Complexity : 3 Standardized tests and measures addressing body structure, function, activity limitation and / or participation in recreation  Presentation: LOW Complexity : Stable, uncomplicated  Clinical Decision Making:Low Complexity    Overall Complexity:LOW     Niki Stallworth AM-PAC® Basic Mobility Inpatient Short Form (6-Clicks) Version 2    How much HELP from another person does the patient currently need    (If the patient hasn't done an activity recently, how much help from another person do you think he/she would need if he/she tried?)   Total (Total A or Dep)   A Lot  (Mod to Max A)   A Little (Sup or Min A)   None (Mod I to I)   Turning from your back to your side while in a flat bed without using bedrails? [] 1 [] 2 [x] 3 [] 4   2. Moving from lying on your back to sitting on the side of a flat bed without using bedrails? [] 1 [] 2 [x] 3 [] 4   3. Moving to and from a bed to a chair (including a wheelchair)? [] 1 [] 2 [x] 3 [] 4   4. Standing up from a chair using your arms (e.g., wheelchair, or bedside chair)? [] 1 [] 2 [x] 3 [] 4   5. Walking in hospital room? [] 1 [] 2 [x] 3 [] 4   6. Climbing 3-5 steps with a railing?+   [] 1 [x] 2 [] 3 [] 4   +If stair climbing cannot be assessed, skip item #6. Sum responses from items 1-5.

## 2022-12-10 LAB
ANION GAP SERPL CALC-SCNC: 9 MMOL/L (ref 3–18)
BASOPHILS # BLD: 0 K/UL (ref 0–0.1)
BASOPHILS NFR BLD: 0 % (ref 0–2)
BUN SERPL-MCNC: 36 MG/DL (ref 7–18)
BUN/CREAT SERPL: 15 (ref 12–20)
CALCIUM SERPL-MCNC: 9.7 MG/DL (ref 8.5–10.1)
CALCIUM SERPL-MCNC: 9.8 MG/DL (ref 8.5–10.1)
CHLORIDE SERPL-SCNC: 102 MMOL/L (ref 100–111)
CO2 SERPL-SCNC: 27 MMOL/L (ref 21–32)
CREAT SERPL-MCNC: 2.48 MG/DL (ref 0.6–1.3)
DIFFERENTIAL METHOD BLD: ABNORMAL
EOSINOPHIL # BLD: 0.1 K/UL (ref 0–0.4)
EOSINOPHIL NFR BLD: 3 % (ref 0–5)
ERYTHROCYTE [DISTWIDTH] IN BLOOD BY AUTOMATED COUNT: 17.1 % (ref 11.6–14.5)
FERRITIN SERPL-MCNC: 434 NG/ML (ref 8–388)
GLUCOSE BLD STRIP.AUTO-MCNC: 170 MG/DL (ref 70–110)
GLUCOSE BLD STRIP.AUTO-MCNC: 174 MG/DL (ref 70–110)
GLUCOSE BLD STRIP.AUTO-MCNC: 179 MG/DL (ref 70–110)
GLUCOSE SERPL-MCNC: 145 MG/DL (ref 74–99)
HCT VFR BLD AUTO: 33.1 % (ref 35–45)
HGB BLD-MCNC: 10.2 G/DL (ref 12–16)
IMM GRANULOCYTES # BLD AUTO: 0.1 K/UL (ref 0–0.04)
IMM GRANULOCYTES NFR BLD AUTO: 1 % (ref 0–0.5)
IRON SATN MFR SERPL: 19 % (ref 20–50)
IRON SERPL-MCNC: 51 UG/DL (ref 50–175)
LYMPHOCYTES # BLD: 1.7 K/UL (ref 0.9–3.6)
LYMPHOCYTES NFR BLD: 31 % (ref 21–52)
MCH RBC QN AUTO: 28.7 PG (ref 24–34)
MCHC RBC AUTO-ENTMCNC: 30.8 G/DL (ref 31–37)
MCV RBC AUTO: 93 FL (ref 78–100)
MONOCYTES # BLD: 0.7 K/UL (ref 0.05–1.2)
MONOCYTES NFR BLD: 13 % (ref 3–10)
NEUTS SEG # BLD: 2.8 K/UL (ref 1.8–8)
NEUTS SEG NFR BLD: 51 % (ref 40–73)
NRBC # BLD: 0.16 K/UL (ref 0–0.01)
NRBC BLD-RTO: 3 PER 100 WBC
PHOSPHATE SERPL-MCNC: 3.7 MG/DL (ref 2.5–4.9)
PLATELET # BLD AUTO: 309 K/UL (ref 135–420)
PMV BLD AUTO: 10.3 FL (ref 9.2–11.8)
POTASSIUM SERPL-SCNC: 4.4 MMOL/L (ref 3.5–5.5)
PTH-INTACT SERPL-MCNC: 171.2 PG/ML (ref 18.4–88)
RBC # BLD AUTO: 3.56 M/UL (ref 4.2–5.3)
SODIUM SERPL-SCNC: 138 MMOL/L (ref 136–145)
TIBC SERPL-MCNC: 266 UG/DL (ref 250–450)
WBC # BLD AUTO: 5.4 K/UL (ref 4.6–13.2)

## 2022-12-10 PROCEDURE — 2709999900 HC NON-CHARGEABLE SUPPLY

## 2022-12-10 PROCEDURE — 85025 COMPLETE CBC W/AUTO DIFF WBC: CPT

## 2022-12-10 PROCEDURE — 97535 SELF CARE MNGMENT TRAINING: CPT

## 2022-12-10 PROCEDURE — 65270000046 HC RM TELEMETRY

## 2022-12-10 PROCEDURE — 82962 GLUCOSE BLOOD TEST: CPT

## 2022-12-10 PROCEDURE — 84100 ASSAY OF PHOSPHORUS: CPT

## 2022-12-10 PROCEDURE — 99232 SBSQ HOSP IP/OBS MODERATE 35: CPT | Performed by: INTERNAL MEDICINE

## 2022-12-10 PROCEDURE — 80048 BASIC METABOLIC PNL TOTAL CA: CPT

## 2022-12-10 PROCEDURE — 90935 HEMODIALYSIS ONE EVALUATION: CPT

## 2022-12-10 PROCEDURE — 77030038269 HC DRN EXT URIN PURWCK BARD -A

## 2022-12-10 PROCEDURE — 97165 OT EVAL LOW COMPLEX 30 MIN: CPT

## 2022-12-10 PROCEDURE — 36415 COLL VENOUS BLD VENIPUNCTURE: CPT

## 2022-12-10 PROCEDURE — 74011250637 HC RX REV CODE- 250/637: Performed by: STUDENT IN AN ORGANIZED HEALTH CARE EDUCATION/TRAINING PROGRAM

## 2022-12-10 PROCEDURE — 94762 N-INVAS EAR/PLS OXIMTRY CONT: CPT

## 2022-12-10 PROCEDURE — 83970 ASSAY OF PARATHORMONE: CPT

## 2022-12-10 PROCEDURE — 82728 ASSAY OF FERRITIN: CPT

## 2022-12-10 PROCEDURE — 74011250636 HC RX REV CODE- 250/636: Performed by: STUDENT IN AN ORGANIZED HEALTH CARE EDUCATION/TRAINING PROGRAM

## 2022-12-10 PROCEDURE — 74011636637 HC RX REV CODE- 636/637: Performed by: STUDENT IN AN ORGANIZED HEALTH CARE EDUCATION/TRAINING PROGRAM

## 2022-12-10 PROCEDURE — 74011000250 HC RX REV CODE- 250: Performed by: STUDENT IN AN ORGANIZED HEALTH CARE EDUCATION/TRAINING PROGRAM

## 2022-12-10 PROCEDURE — 74011250636 HC RX REV CODE- 250/636: Performed by: INTERNAL MEDICINE

## 2022-12-10 PROCEDURE — 83540 ASSAY OF IRON: CPT

## 2022-12-10 RX ORDER — HEPARIN SODIUM 1000 [USP'U]/ML
3800 INJECTION, SOLUTION INTRAVENOUS; SUBCUTANEOUS ONCE
Status: DISPENSED | OUTPATIENT
Start: 2022-12-10 | End: 2022-12-10

## 2022-12-10 RX ADMIN — HEPARIN SODIUM 5000 UNITS: 5000 INJECTION INTRAVENOUS; SUBCUTANEOUS at 15:54

## 2022-12-10 RX ADMIN — OXYCODONE HYDROCHLORIDE AND ACETAMINOPHEN 1 TABLET: 5; 325 TABLET ORAL at 16:28

## 2022-12-10 RX ADMIN — SODIUM CHLORIDE, PRESERVATIVE FREE 10 ML: 5 INJECTION INTRAVENOUS at 15:55

## 2022-12-10 RX ADMIN — GABAPENTIN 400 MG: 400 CAPSULE ORAL at 10:43

## 2022-12-10 RX ADMIN — METOPROLOL TARTRATE 25 MG: 25 TABLET, FILM COATED ORAL at 18:42

## 2022-12-10 RX ADMIN — NYSTATIN: 100000 POWDER TOPICAL at 18:44

## 2022-12-10 RX ADMIN — OXYCODONE HYDROCHLORIDE AND ACETAMINOPHEN 1 TABLET: 5; 325 TABLET ORAL at 10:43

## 2022-12-10 RX ADMIN — SODIUM CHLORIDE, PRESERVATIVE FREE 10 ML: 5 INJECTION INTRAVENOUS at 10:47

## 2022-12-10 RX ADMIN — HEPARIN SODIUM 5000 UNITS: 5000 INJECTION INTRAVENOUS; SUBCUTANEOUS at 06:21

## 2022-12-10 RX ADMIN — SODIUM CHLORIDE, PRESERVATIVE FREE 10 ML: 5 INJECTION INTRAVENOUS at 21:36

## 2022-12-10 RX ADMIN — HYDRALAZINE HYDROCHLORIDE 100 MG: 50 TABLET, FILM COATED ORAL at 18:42

## 2022-12-10 RX ADMIN — Medication 2 UNITS: at 21:36

## 2022-12-10 RX ADMIN — Medication 5 MG: at 21:35

## 2022-12-10 RX ADMIN — Medication 2 UNITS: at 16:29

## 2022-12-10 RX ADMIN — IRON SUCROSE 100 MG: 20 INJECTION, SOLUTION INTRAVENOUS at 21:35

## 2022-12-10 RX ADMIN — HEPARIN SODIUM 5000 UNITS: 5000 INJECTION INTRAVENOUS; SUBCUTANEOUS at 21:36

## 2022-12-10 NOTE — ACP (ADVANCE CARE PLANNING)
Advance Care Planning   Advance Care Planning Inpatient Note  [address] [place] [address]   Spiritual Care Department    Today's Date: 12/10/2022  Unit: [facility]    Received request from . Upon review of chart and communication with care team, patient's decision making abilities are not in question. Patient was/were present in the room during visit. Goals of ACP Conversation:  Discuss Advance Care planning documents    Health Care Decision Makers:      Primary Decision Maker: Kylah fee - [phone]    Summary:  Verified [facility]    Advance Care Planning Documents (Patient Wishes) on file:  Healthcare Power of /Advance Directive appointment of Health care agent     Assessment:    Patient seen as a new admit to the hospital once again. Patient stated that she had not long been discharged from here. Patient has an advance directive on file here.     Interventions:      Care Preferences Communicated:  No    Outcomes/Plan:  Existing Advance Directive reviewed with patient; no changes to patient's previously recorded wishes    [facility] on 12/10/2022 at 2:56 PM

## 2022-12-10 NOTE — PROGRESS NOTES
RENAL DAILY PROGRESS NOTE              Subjective:       Complaint:    on 12/10/2022, I saw and examined patient during hemodialysis treatment. The patient was receiving hemodialysis for treatment of  renal failure. I have also reviewed vital signs, intake and output, lab results and recent events, and agreed with today's dialysis order. Overnight events noted  no nausea, vomiting, chest pain, short of breath, cough, seizure. IMPRESSION:   ESRD on HD TTS  Dizziness,lower ext weakness r/o PCA stroke  Vertebral artery stenosis on CTA neck  Cardiomyopathy, CAD  Secondary hyperparathyroidism  ACD   PLAN:   Iv venofer with dialysis  Tolerating HD well  Phos is ok  If patient need IV contrast for CT imaging, need to be coordinated with renal team.   Avoid Gadolinium due to its association with nephrogenic systemic fibrosis in a patients with severe ARF and ESRD. Please dose all medications for creatinine clearance <15/dialysis. Avoid blood pressure checks, blood draws, peripheral iv's on arm with access. AvoidPICC lines on either arm in order to preserve veins for dialysis access creation.              Current Facility-Administered Medications   Medication Dose Route Frequency    heparin (porcine) 1,000 unit/mL injection 3,800 Units  3,800 Units Hemodialysis ONCE    albuterol-ipratropium (DUO-NEB) 2.5 MG-0.5 MG/3 ML  3 mL Nebulization Q6H PRN    gabapentin (NEURONTIN) capsule 400 mg  400 mg Oral DAILY    metoprolol tartrate (LOPRESSOR) tablet 25 mg  25 mg Oral BID    hydrALAZINE (APRESOLINE) tablet 100 mg  100 mg Oral BID    sodium chloride (NS) flush 5-40 mL  5-40 mL IntraVENous Q8H    sodium chloride (NS) flush 5-40 mL  5-40 mL IntraVENous PRN    acetaminophen (TYLENOL) tablet 650 mg  650 mg Oral Q6H PRN    Or    acetaminophen (TYLENOL) suppository 650 mg  650 mg Rectal Q6H PRN    polyethylene glycol (MIRALAX) packet 17 g  17 g Oral DAILY PRN    ondansetron (ZOFRAN ODT) tablet 4 mg  4 mg Oral Q8H PRN    Or    ondansetron (ZOFRAN) injection 4 mg  4 mg IntraVENous Q6H PRN    heparin (porcine) injection 5,000 Units  5,000 Units SubCUTAneous Q8H    glucose chewable tablet 16 g  4 Tablet Oral PRN    glucagon (GLUCAGEN) injection 1 mg  1 mg IntraMUSCular PRN    dextrose 10% infusion 0-250 mL  0-250 mL IntraVENous PRN    insulin lispro (HUMALOG) injection   SubCUTAneous AC&HS    nystatin (MYCOSTATIN) 100,000 unit/gram powder   Topical BID    oxyCODONE-acetaminophen (PERCOCET) 5-325 mg per tablet 1 Tablet  1 Tablet Oral Q6H PRN    melatonin tablet 5 mg  5 mg Oral QHS       Review of Symptoms: comprehensive ROS negative except above. Objective:   Patient Vitals for the past 24 hrs:   Temp Pulse Resp BP SpO2   12/10/22 1350 98.4 °F (36.9 °C) 98 18 125/61 --   12/10/22 1345 -- 93 18 106/75 --   12/10/22 1330 -- (!) 101 18 110/68 --   12/10/22 1315 -- 97 18 94/67 --   12/10/22 1300 -- 95 18 130/64 --   12/10/22 1245 -- 89 18 125/70 --   12/10/22 1230 -- 77 18 101/85 --   12/10/22 1215 -- 91 18 126/64 --   12/10/22 1200 -- 61 18 102/60 --   12/10/22 1145 -- 89 18 130/66 --   12/10/22 1130 -- 88 18 (!) 126/56 --   12/10/22 1115 -- 84 18 138/67 --   12/10/22 1106 -- 85 18 (!) 141/61 --   12/10/22 1053 97.7 °F (36.5 °C) 95 18 (!) 140/59 --   12/10/22 0828 98.1 °F (36.7 °C) 95 17 119/67 92 %   12/10/22 0009 98.4 °F (36.9 °C) 83 18 104/67 94 %   12/09/22 2214 98.5 °F (36.9 °C) 72 18 (!) 101/50 94 %   12/09/22 1418 97.6 °F (36.4 °C) 92 18 (!) 118/59 95 %        Weight change: -2.268 kg (-5 lb)     No intake/output data recorded.     Intake/Output Summary (Last 24 hours) at 12/10/2022 1407  Last data filed at 12/10/2022 1345  Gross per 24 hour   Intake --   Output 1661 ml   Net -1661 ml     Physical Exam:   General: comfortable, no acute distress   HEENT sclera anicteric, supple neck, no thyromegaly  CVS: S1S2 heard,  no rub  RS: + air entry b/l,   Abd: Soft, Non tender, Not distended, Positive bowel sounds, no organomegaly, no CVA / supra pubic tenderness  Neuro: non focal, awake, alert , CN II-XII are grossly intact  Extrm:no edema, no cyanosis, clubbing   Skin: no visible  Rash  Musculoskeletal: No gross joints or bone deformities         Data Review:     LABS:   Hematology:   Recent Labs     12/10/22  0427 12/09/22  1248 12/08/22  1225   WBC 5.4 5.0 5.2   HGB 10.2* 10.1* 10.4*   HCT 33.1* 33.2* 34.8*     Chemistry:   Recent Labs     12/10/22  0427 12/09/22  1248 12/08/22  1225   BUN 36* 33* 36*   CREA 2.48* 2.22* 3.45*   CA 9.7  9.8 9.6 9.3   ALB  --  3.3* 3.5   K 4.4 4.3 4.3    137 134*    103 98*   CO2 27 26 28   PHOS 3.7  --  4.1   * 137* 282*            Procedures/imaging: see electronic medical records for all procedures, Xrays and details which were not copied into this note but were reviewed prior to creation of Plan          Assessment & Plan:     See above        Vince Etienne MD  12/10/2022  2:07 PM

## 2022-12-10 NOTE — PROGRESS NOTES
Received pre HD report from [facility]. Pt in bed, A+O x4, crying c/o abdomen pain that primary RN gave meds for prior to pt arrival to HD. no s/s of distress noted. Accessed right side CVC per protocol. Tx initiated at 1106. TDC flowing with ease. For hemodynamic stability UF goal 2500 ml. Offered assistance with repositioning every 2 hours. Vascular access visible at all times during treatment, line connections intact at all times. Pt tx ended 24 mins early d/t venous chamber clotting. Patient able to received blood return no blood loss. Md made aware. Tx completed at 1345, tolerated well 1.7L removed. De-accessed per protocol. Heparin indwell 1.9ml in arterial, and 1.9ml in venous catheter. Unit nurse given report.                        ACUTE HEMODIALYSIS FLOW SHEET      HEMODIALYSIS ORDERS: Physician:  Camilo Oleary     Dialyzer: Revaclear   Duration: 3   BFR: 350  DFR: 600   Dialysate:  Temp 36-37*C   K+  2    Ca+ 2.5   Na 138  Bicarb 30   Wt Readings from Last 1 Encounters:        Patient Chart [x]   Unable to Obtain []  Dry weight/UF Goal:2500   Heparin []  Bolus    Units    [] Hourly    Units    [x]None       Pre BP  140/59    Pulse: 95 Respirations: 18 Temp: 97.7 [x] Tempoal  [] Ax  [] Esoph   Labs: []  Pre  []  Post:   [x] N/A   Additional Orders (medications, blood products, hypotension management): [] Yes   [x] No       [x]   [facility] Consent Verified       CATHETER ACCESS:  []N/A   [x]Right   []Left   []IJ   []Fem  [x]Chest wall  []TransHepatic   [] First use X-ray verified     [x]Tunnel    [] Non Tunneled   [x]No S/S infection  []Redness  []Drainage []Cultured []Swelling []Pain   [x]Medical Aseptic Prep Utilized   [x]Dressing Changed  [x] Biopatch  Date: 12/10/22   []Clotted   [x]Patent   Flows: []Good  [x]Poor  [x]Reversed   If access problem,  notified: []Yes    [x]N/A                     GENERAL ASSESSMENT:    LUNGS:  Resp Rate 18   [] Clear  [x] Coarse  [] Crackles  [] Wheezing  [] Diminished                                                           [] RLL   [] LLL  [] RUL   [] ISAURO            Respirations:  [x]Easy  []Labored  [x]N/A  Cough:  []Productive  []Dry  []N/A               Therapy:  [x]RA   [] Ventilated   [] Intubated   [] Trach            O2 Device:  [] NC   [] NRB  [] Trach Mask  [] BiPaP  Flow:   l/min                                                    CARDIAC: [x] Regular      [] Irregular   [] Rhythm:          [] Monitored   [] Bedside   [] Remotely monitored       EDEMA: [] None   [x]Generalized  [] Pitting [] 1+   [] 2 +   [] 3+    [] 4+        SKIN:   [] Hot     [] Cold    [x] Warm   [] Dry    [] Diaphoretic                 [] Flushed  [] Jaundiced  [] Cyanotic  [] Pale      LOC:    [x] Alert      [x]Oriented:    [x] Person     [x] Place   [x]Time               [] Confused  [] Lethargic  [] Medicated  [] Non-responsive  [] Non-Verbal     GI / ABDOMEN:                     [] Flat    [] Distended    [x] Soft    [] Firm   []  Obese                   [] Diarrhea   [] FMS [] Bowel Sounds  [] Nausea  [] Vomiting                   [] NGT  [] OGT  [] PEG  [] Tube Feedings @     mL/hr     / URINE ASSESSMENT:                   [] Voiding    [] Oliguria  [x] Anuria                     []  Frye   [] Incontinent  []  Incontinent Brief   []  PureWick     PAIN:  [x] 0 []1  []2   []3   []4   []5   []6   []7   []8   []9   []10                MOBILITY:  [x] Bed    [] Stretcher      All Vitals and Treatment Details on Attached [email or web address]: [facility]          Room # 018/02    [] Routine         [x] 1st Time Acute/Chronic   [] Urgent      [] Stat              [x] Acute Room   []  Bedside    [] ICU/CCU     [] ER     Isolation Precautions:  [x] Dialysis    There are currently no Active Isolations     ALLERGIES:     No Known Allergies     Code Status:  Full Code     Hepatitis Status      Lab Results   Component Value Date/Time    Hepatitis A, IgM NEGATIVE 03/23/2015 09:04 AM    Hepatitis B surface Ag <0.10 12/09/2022 07:03 PM    Hepatitis B surface Ab <3.10 (L) 09/16/2022 11:52 AM    Hepatitis B core, IgM NEGATIVE 03/23/2015 09:04 AM    Hepatitis C virus Ab 0.09 03/23/2015 09:04 AM        Current Labs:      Lab Results   Component Value Date/Time    WBC 5.4 12/10/2022 04:27 AM    HGB 10.2 (L) 12/10/2022 04:27 AM    HCT 33.1 (L) 12/10/2022 04:27 AM    PLATELET 851 91/52/5526 04:27 AM    MCV 93.0 12/10/2022 04:27 AM     Lab Results   Component Value Date/Time    Sodium 138 12/10/2022 04:27 AM    Potassium 4.4 12/10/2022 04:27 AM    Chloride 102 12/10/2022 04:27 AM    CO2 27 12/10/2022 04:27 AM    Anion gap 9 12/10/2022 04:27 AM    Glucose 145 (H) 12/10/2022 04:27 AM    BUN 36 (H) 12/10/2022 04:27 AM    Creatinine 2.48 (H) 12/10/2022 04:27 AM    BUN/Creatinine ratio 15 12/10/2022 04:27 AM    GFR est AA 54 (L) 09/22/2022 04:20 AM    GFR est non-AA 44 (L) 09/22/2022 04:20 AM    Calcium 9.8 12/10/2022 04:27 AM    Calcium 9.7 12/10/2022 04:27 AM          DIET:  DIET ADULT     PRIMARY NURSE REPORT:   Pre Dialysis: Letty Nichols RN    Time: 1000    EDUCATION:    [x] Patient           Knowledge Basis: []None []Minimal [x] Substantial [] Unknown  Barriers to learning  [x]None  [] Intubated/Trached/Ventilated  [] Sedated/Paralyzed   [x] Access Care     [] S&S of infection  [] Fluid Management  [] K+   [x] Procedural    [] Medications   [] Tx Options   [] Transplant   [] Diet      Teaching Tools:  [x] Explain  [] Demo  [] Handouts [] Video  Patient response: [x] Verbalized understanding   [] Requires follow up        [x] Time Out/Safety Check    [x] Extracorporeal Circuit Tested for integrity       RO/HEMODIALYSIS MACHINE SAFETY CHECKS - Before each treatment:        [facility]                                    [x] Unit Machine # 6 with centralized RO                                  [] Portable Machine #1/RO serial # [id number]                                  [] Portable Machine #2/RO serial # [id number] [] Portable Machine #4/RO serial # [id number]                                  [] Portable Machine #3/RO serial # [id number]                                                                                                       Alarm Test:  Pass time 5492         [x] RO/Machine Log Complete    Machine Temp    36-37*C             Dialysate: pH  7.4    Conductivity: Meter 14.0    HD Machine  14    TCD: 13.8  Dialyzer Lot # [id number]    Blood Tubing Lot # [id number]    Saline Lot #[id number]     CHLORINE TESTING-Before each treatment and every 4 hours    Total Chlorine: [x] less than 0.1 ppm  Initial Time Check: 0745     4 Hr/2nd Check Time: 1145   (if greater than 0.1 ppm from Primary then every 30 minutes from Secondary)     TREATMENT INITIATION - with Dialysis Precautions:   [x] All Connections Secured              [x] Saline Line Double Clamped   [x] Venous Parameters Set               [x] Arterial Parameters Set    [x] Prime Given 250ml NSS              [x]Air Foam Detector Engaged                   Medication    Dose    Volume Route      Time       [facility] Nurse      HD  Jesus Endo,  RN      HD         HD                  Post Assessment  Dialyzer Cleared:   [] Good  [] Fair  [x] Poor  Blood processed:  50.9 L  UF Removed:  4635 Ml    Post BP: 125/61  Pulse: 98  Respirations: 18   Temp: 98.4  [x] Temporal  [] Ax  [] Esophageal   Lungs: [] Clear                [x] No change from initial assessment   Post Tx Vascular Access: [x] N/A  AVF/AVG: Bleeding stopped with  Arterial Pressure for   min   Venous Pressure for   min      Cardiac:  [x] Regular   [] Irregular   Rhythm:  [] Monitored   [] Not Monitored    CVC Catheter: [] N/A  Locking solution: Heparin 1:1000 U  Arterial port 1.9 ml   Venous port 1.9 ml   Edema:  [x] None  [] Generalized                     Skin:[x] Warm  [] Dry [] Diaphoretic               [] Flushed  [] Pale [] Cyanotic Pain:  [x]0  []1-2  []3-4  []5-6   []7-8  []9-10 Post Treatment Note:   See above note             POST TREATMENT PRIMARY NURSE HANDOFF REPORT:   Post Dialysis: ABDULLAHI Viera RN        Time:  4372         Abbreviations: AVG-arterial venous graft, AVF-arterial venous fistula, IJ-Internal Jugular, Subcl-Subclavian, Fem-Femoral, Tx-treatment, AP/HR-apical heart rate, VSS- Vital Signs Stable, CVC- Central Venous Catheter, DFR-dialysate flow rate, BFR-blood flow rate, AP-arterial pressure, -venous pressure, UF-ultrafiltrate, TMP-transmembrane pressure, Evelio-Venous, Art-Arterial, RO-Reverse Osmosis

## 2022-12-10 NOTE — PROGRESS NOTES
OCCUPATIONAL THERAPY EVALUATION/DISCHARGE    Patient: Emigdio Acosta (77 y.o. female)  Date: 12/10/2022  Primary Diagnosis: CVA (cerebral vascular accident) ([facility] [I63.9]  ESRD (end stage renal disease) on dialysis ([facility] [N18.6, Z99.2]  TIA (transient ischemic attack) [G45.9]       Precautions:   Fall  PLOF: Pt lives with spouse, reports being Mod Ind for ADLs and functional mobility w/o AD. ASSESSMENT AND RECOMMENDATIONS:  Based on the objective data described below, the patient presents with no functional deficits that would impede participation and independence in ADLs and functional transfers. Pt demos UB /LB ADLs with Mod Ind/Supervision in sitting and standing, reports no dizziness. Pt demonstrates appropriate safety awareness with functional mobility, however, benefits from Supervision due to slight unsteadiness when walks to the BR w/o AD. We will defer mobility concerns to PT. Pt reports no further concerns to OT at this time. We will sign off. Pt's BP std 114/65  Supine 119/60    Skilled occupational therapy in acute care is not indicated at this time. Pt may benefit from [facility] safety check to ensure safety with ADLs in home environment. Further Equipment Recommendations for Discharge: N/A    AMPAC: Current research shows that an AM-PAC score of 18 or greater is associated with a discharge to the patient's home setting. Based on an AM-PAC score of 21/24 and their current ADL deficits; it is recommended that the patient have 2-3 sessions per week of Occupational Therapy at d/c to increase the patient's independence. This AMPAC score should be considered in conjunction with interdisciplinary team recommendations to determine the most appropriate discharge setting. Patient's social support, diagnosis, medical stability, and prior level of function should also be taken into consideration. SUBJECTIVE:   Patient stated I am not feeling great.     OBJECTIVE DATA SUMMARY:     Past Medical History:   Diagnosis Date    Abnormal WBC count 5/17/2016    Anemia     Bilateral shoulder pain 9/27/2016    Chondromalacia of both patellae     COPD (chronic obstructive pulmonary disease) ([facility] 9/2015    mild-mod dz; Dr Kaci Pate    Diabetes [facility]) 2013    Diabetic eye exam ([facility] [facility] 2016    D[facility]     Dyslipidemia     Gout     Heart attack ([facility] 10/18/2019    History of echocardiogram 11/14/2014    Mild LVE. EF 40%. Mild, diffuse hypk. Mild LAE. Mild MR. Hypercholesteremia 1/08/14    Hypertension 2000    Noncompliance with medications 2/16/2016    Obesity     Orthostatic hypotension 5/17/2016    Osteoarthritis of both knees     Pain management 04/01/2016    Dr. Bloom Awais     Popliteal cyst, bilateral      Stage 3b chronic kidney disease ([facility] 8/8/2022    Vitamin D deficiency 10/16/14     Past Surgical History:   Procedure Laterality Date    HX HEART CATHETERIZATION      HX TUBAL LIGATION      IR INSERT TUNL CVC W/O PORT OVER 5 YR  9/21/2022     Barriers to Learning/Limitations: None  Compensate with: visual, verbal, tactile, kinesthetic cues/model    Home Situation:   Home Situation  # Steps to Enter: 4  Rails to Enter: Yes  One/Two Story Residence: One story  Living Alone: No  Support Systems: Spouse/Significant Other  Current DME Used/Available at Home: [place], [facility] [place] chair, Commode, bedside, Cane, straight  Tub or Shower Type: Shower  [x]     Right hand dominant   []     Left hand dominant    Cognitive/Behavioral Status:  Neurologic State: Alert  Orientation Level: Oriented X4  Cognition: Follows commands  Safety/Judgement: Awareness of environment; Fall prevention    Skin: visible skin intact  Edema: none noted    Vision/Perceptual:       Acuity: Able to read clock/calendar on wall without difficulty     Coordination: BUE  Coordination: Generally decreased, functional  Fine Motor Skills-Upper: Left Intact; Right Intact    Gross Motor Skills-Upper: Left Intact; Right Intact    Balance:  Sitting: Intact  Standing: Impaired; Without support  Standing - Static: Good  Standing - Dynamic : Good;Fair (Good minus to Fair plus)    Strength: BUE  Strength: Generally decreased, functional   Tone & Sensation: BUE    Tone: Normal  Sensation: Intact   Range of Motion: BUE  AROM: Generally decreased, functional   Functional Mobility and Transfers for ADLs:  Bed Mobility:     Supine to Sit: Modified independent  Sit to Supine: Supervision     Transfers:  Sit to Stand: Supervision  Stand to Sit: Supervision   Toilet Transfer : Supervision    Bathroom Mobility: Stand-by assistance  ADL Assessment:  Feeding: Setup  Oral Facial Hygiene/Grooming: Setup  Bathing: Supervision  Upper Body Dressing: Supervision  Lower Body Dressing: Supervision  Toileting: Supervision   ADL Intervention:     Cognitive Retraining  Safety/Judgement: Awareness of environment; Fall prevention  Pain:  Pain level pre-treatment: 0/10   Pain level post-treatment: 0/10     Activity Tolerance:   Fair  Please refer to the flowsheet for vital signs taken during this treatment. After treatment:   []  Patient left in no apparent distress sitting up in chair  [x]  Patient left in no apparent distress in bed  [x]  Call bell left within reach  [x]  Nursing notified  []  Caregiver present  []  Bed alarm activated    COMMUNICATION/EDUCATION:   [x]      Role of Occupational Therapy in the acute care setting  [x]      Home safety education was provided and the patient/caregiver indicated understanding. []      Patient/family have participated as able and agree with findings and recommendations. []      Patient is unable to participate in plan of care at this time. Thank you for this referral.  Radha Oleary OTR/L  Time Calculation: 20 mins      Eval Complexity: History: LOW Complexity : Brief history review ;    Examination: LOW Complexity : 1-3 performance deficits relating to physical, cognitive , or psychosocial skils that result in activity limitations and / or participation restrictions ; Decision Making:LOW Complexity : No comorbidities that affect functional and no verbal or physical assistance needed to complete eval tasks     Niki Stallworth AM-PAC® Daily Activity Inpatient Short Form (6-Clicks)*    How much HELP from another person does the patient currently need    (If the patient hasn't done an activity recently, how much help from another person do you think he/she would need if he/she tried?)   Total (Total A or Dep)   A Lot  (Mod to Max A)   A Little (Sup or Min A)   None (Mod I to I)   Putting on and taking off regular lower body clothing? [] 1 [] 2 [x] 3 [] 4   2. Bathing (including washing, rinsing,      drying)? [] 1 [] 2 [x] 3 [] 4   3. Toileting, which includes using toilet, bedpan or urinal?   [] 1 [] 2 [x] 3 [] 4   4. Putting on and taking off regular upper body clothing? [] 1 [] 2 [] 3 [x] 4   5. Taking care of personal grooming such as brushing teeth? [] 1 [] 2 [] 3 [x] 4   6. Eating meals?    [] 1 [] 2 [] 3 [x] 4

## 2022-12-10 NOTE — PROGRESS NOTES
INTERVENTION:  HEMODYNAMIC STABILIZATION  MAINTAIN BP WNL WHILE ON HD. INTERVENTION:  FLUID MANAGEMENT  WILL ATTEMPT 2500 ML TOTAL FLUID REMOVAL AS TOLERATED. INTERVENTION:  METABOLIC/ELECTROLYTE MANAGEMENT  2.0 POTASSIUM 2.5 CALCIUM DIALYSATE USED WITH HD TODAY. INTERVENTION:  HEMODIALYSIS ACCESS SITE MANAGEMENT  RIGHT SIDE CVC ACCESSED USING ASEPTIC TECHNIQUE. GOAL:  SIGNS AND SYMPTOMS OF LISTED POTENTIAL PROBLEMS WILL BE ABSENT OR MANAGEABLE. OUTCOME:  PROGRESSING. HD PLANNED FOR 3 HOURS TODAY.          Problem: Patient Education: Go to Patient Education Activity  Goal: Patient/Family Education  Outcome: Progressing Towards Goal     Problem: Chronic Renal Failure  Goal: *Fluid and electrolytes stabilized  Outcome: Progressing Towards Goal

## 2022-12-10 NOTE — PROGRESS NOTES
conducted an initial consultation and Spiritual Assessment for Preeti Armas, who is a 81 y.o.,female. Patients Primary Language is: [place]. According to the patients EMR Rastafari Affiliation is: Other.      The reason the Patient came to the hospital is:   Patient Active Problem List    Diagnosis Date Noted    TIA (transient ischemic attack) 12/09/2022    Dizziness 12/09/2022    Stenosis of both vertebral arteries 12/09/2022    CVA (cerebral vascular accident) ([address] 12/08/2022    ESRD (end stage renal disease) on dialysis ([address] 12/08/2022    CHF (congestive heart failure) ([address] 09/09/2022    Pneumonia due to COVID-19 virus 08/08/2022    Acute on chronic diastolic heart failure ([address] 08/08/2022    Stage 3b chronic kidney disease ([address] 08/08/2022    Pulmonary hypertension ([address] 07/08/2022    Acute exacerbation of CHF (congestive heart failure) ([address] 07/08/2022    History of myocardial infarction 06/27/2022    Acute hyperkalemia 06/26/2022    Breast cancer ([address] 06/26/2022    Rectal cancer ([address] 06/26/2022    Hyperkalemia 03/07/2022    Stage 1 acute kidney injury ([address] 12/18/2021    Hypotension 03/19/2021    Sepsis ([address] 03/18/2021    Nonrheumatic mitral valve regurgitation 08/13/2020    Chronic systolic congestive heart failure ([address] 11/07/2019    Pulmonary edema cardiac cause ([address] 10/19/2019    COPD with acute exacerbation ([address] 10/19/2019    Respiratory failure requiring intubation ([address] 10/19/2019    Syncope 10/18/2019    Flash pulmonary edema ([address] 10/18/2019    Elevated d-dimer 10/18/2019    Acute respiratory failure with hypoxia ([address] 10/18/2019    CAD (coronary artery disease) 10/18/2019    Ischemic cardiomyopathy 09/09/2019    Combined systolic and diastolic congestive heart failure [facility] 08/26/2019    Type 2 diabetes mellitus with hyperglycemia ([facility] 08/26/2019    TESSA on CPAP 08/26/2019    COPD exacerbation ([facility] 08/25/2019    Type 2 diabetes with nephropathy ([facility] 07/12/2018    Severe obesity (BMI 35.0-39. 9) with comorbidity ([facility] 07/12/2018    SOB (shortness of breath) 06/09/2018    Coronary artery disease involving native coronary artery with unstable angina pectoris ([address] 06/09/2018    Pulmonary edema 06/08/2018    Coronary artery disease involving native coronary artery of native heart without angina pectoris 05/31/2018    STEMI (ST elevation myocardial infarction) ([address] 05/20/2018    Acute pulmonary edema ([address] 05/20/2018    Caregiver stress 04/26/2017    Bilateral shoulder pain 09/27/2016    Elevated alkaline phosphatase level 09/27/2016    Chronic obstructive pulmonary disease ([facility] 05/17/2016    Compliance with medication regimen 05/17/2016    Chronic pain of both knees 04/13/2016    Chronic pain syndrome 04/13/2016    Primary osteoarthritis of both knees 04/13/2016    Iron deficiency anemia 10/22/2015    Decreased GFR 10/22/2015    Dyslipidemia, goal LDL below 70 10/22/2015    Former smoker 10/22/2015    Osteoarthritis of both knees     Essential hypertension 09/17/2015    Chronic obstructive pulmonary disease with acute exacerbation ([facility] 09/01/2015    Environmental allergies 04/01/2015    Cardiomyopathy, dilated ([facility] 12/31/2014        The  provided the following Interventions:  Initiated a relationship of care and support with patient in room 404 today. Patient has been here many times and has an advance directive on file here. Listened empathically as she talked about her being here once again. Provided information about Spiritual Care Services. Offered prayer  on patients behalf. The following outcomes were achieved:  Patient shared limited information about her medical narrative and spiritual journey/beliefs. Patient processed feeling about current hospitalization. Patient expressed gratitude for pastoral care visit. Assessment:  Patient does not have any Druze/cultural needs that will affect patients preferences in health care.   There are no further spiritual or Restoration issues which require Spiritual Care Services interventions at this time. Plan:  Chaplains will continue to follow and will provide pastoral care on an as needed/requested basis    . Edyta Pate   Spiritual Care   [phone]

## 2022-12-10 NOTE — ROUTINE PROCESS
1410-received patient from ED, oriented to room, initial assessment completed, call bell within reach, no distress noted. 1600--  Shift reassessment, pt condition unchanged, will continue to monitor. 1930-- Bedside, Verbal shift change report given to Paulo(oncoming nurse) by Marquis Sharp (offgoing nurse). Report included the following information SBAR, Kardex, Intake/Output, MAR and Recent Results. Skin assessment completed.

## 2022-12-10 NOTE — PROGRESS NOTES
Came by to see patient but not in room  Reviewed images from CAT scan  Will check carotid ultrasound, areas of nonvisualization of left carotid on CT and would like to get a velocity measurements through the bifurcations. Also will better evaluate the vertebrals and direction of flow. Carotid Doppler done in 2019 showed normal vertebrals. Will follow-up with further evaluation.

## 2022-12-10 NOTE — PROGRESS NOTES
[facility]  [facility]    Patient: Loy Pepe Age: 81 y.o. : 1950 MR#: [id number] SSN: [id number]  Date/Time: 12/10/2022     C/C: dizziness      Subjective:   HPI : admitted for Dizziness ,           Review of Systems:Seen at HD   positive responses in bold type   Constitutional: Negative for fever, chills, diaphoresis and unexpected weight change. HENT: Negative for ear pain, congestion, sore throat, rhinorrhea, drooling, trouble swallowing, neck pain and tinnitus. Eyes: Negative for photophobia, pain, redness and visual disturbance. Respiratory: negative for shortness of breath, cough, choking, chest tightness, wheezing or stridor. Cardiovascular: Negative for chest pain, palpitations and leg swelling. Gastrointestinal: Negative for nausea, vomiting, abdominal pain, diarrhea, constipation, blood in stool, abdominal distention and anal bleeding. Genitourinary: Negative for dysuria, urgency, frequency, hematuria, flank pain and difficulty urinating. Musculoskeletal: Negative for back pain and arthralgias. Skin: Negative for color change, rash and wound. Neurological: Negative for dizziness, seizures, syncope, speech difficulty, light-headedness or headaches. Hematological: Does not bruise/bleed easily. Psychiatric/Behavioral: Negative for suicidal ideas, hallucinations, behavioral problems, self-injury or agitation     Assessment/Plan:     1. Dizziness   - CVA ruled out   2 Bilateral Vertebral artery stenosis   3 ESRD On HD   4 HTN  5 h/o CAD  6 DM2    PLAN     - Dizziness ?  From vascular compromise , follow with vascular surgery   - Continue HD   - Continue Home meds     Objective:       General:  Alert, cooperative, no acute distress   HEENT: No facial asymmetry, TANIA Wily, External ears - WNL    Cardiovascular: S1S2 - regular , No Murmur   Pulmonary: Equal expansion , No Use of accessory muscles , No Rales No Rhonchi    GI:  +BS in all four quadrants, soft, non-tender  Extremities:  No edema; 2+ dorsalis pedis pulses bilaterally  Neuro: Alert and oriented X 2.        DVT Prophylaxis:  []Lovenox  []Hep SQ  []SCDs  []Coumadin   []On Heparin gtt    [] Eliquis [] Xarelto     Vitals:         VS: Visit Vitals  /61   Pulse 98   Temp 98.4 °F (36.9 °C) (Temporal)   Resp 18   Ht 5' 4\" (1.626 m)   Wt 97.5 kg (215 lb)   SpO2 92%   BMI 36.90 kg/m²      Tmax/24hrs: Temp (24hrs), Av.1 °F (36.7 °C), Min:97.6 °F (36.4 °C), Max:98.5 °F (36.9 °C)        Medications:   Current Facility-Administered Medications   Medication Dose Route Frequency    heparin (porcine) 1,000 unit/mL injection 3,800 Units  3,800 Units Hemodialysis ONCE    [START ON 2022] iron sucrose (VENOFER) injection 100 mg  100 mg IntraVENous DIALYSIS TUE, THU & SAT    albuterol-ipratropium (DUO-NEB) 2.5 MG-0.5 MG/3 ML  3 mL Nebulization Q6H PRN    gabapentin (NEURONTIN) capsule 400 mg  400 mg Oral DAILY    metoprolol tartrate (LOPRESSOR) tablet 25 mg  25 mg Oral BID    hydrALAZINE (APRESOLINE) tablet 100 mg  100 mg Oral BID    sodium chloride (NS) flush 5-40 mL  5-40 mL IntraVENous Q8H    sodium chloride (NS) flush 5-40 mL  5-40 mL IntraVENous PRN    acetaminophen (TYLENOL) tablet 650 mg  650 mg Oral Q6H PRN    Or    acetaminophen (TYLENOL) suppository 650 mg  650 mg Rectal Q6H PRN    polyethylene glycol (MIRALAX) packet 17 g  17 g Oral DAILY PRN    ondansetron (ZOFRAN ODT) tablet 4 mg  4 mg Oral Q8H PRN    Or    ondansetron (ZOFRAN) injection 4 mg  4 mg IntraVENous Q6H PRN    heparin (porcine) injection 5,000 Units  5,000 Units SubCUTAneous Q8H    glucose chewable tablet 16 g  4 Tablet Oral PRN    glucagon (GLUCAGEN) injection 1 mg  1 mg IntraMUSCular PRN    dextrose 10% infusion 0-250 mL  0-250 mL IntraVENous PRN    insulin lispro (HUMALOG) injection   SubCUTAneous AC&HS    nystatin (MYCOSTATIN) 100,000 unit/gram powder   Topical BID    oxyCODONE-acetaminophen (PERCOCET) 5-325 mg per tablet 1 Tablet  1 Tablet Oral Q6H PRN    melatonin tablet 5 mg  5 mg Oral QHS       Labs:    Recent Labs     12/10/22  0427 12/09/22  1248 12/08/22  1225   WBC 5.4 5.0 5.2   HGB 10.2* 10.1* 10.4*   HCT 33.1* 33.2* 34.8*    254 257     Recent Labs     12/10/22  0427 12/09/22  1248 12/08/22  1225    137 134*   K 4.4 4.3 4.3    103 98*   CO2 27 26 28   * 137* 282*   BUN 36* 33* 36*   CREA 2.48* 2.22* 3.45*   CA 9.7  9.8 9.6 9.3   MG  --   --  2.4   PHOS 3.7  --  4.1   ALB  --  3.3* 3.5   ALT  --  21 24         Time spent on direct patient care >30 mints     Complexity : High complex - due to multiple medical issues outlined above. CODE Status : Full CODE     Case discussed with:  [x]Patient  [] Family  []Nursing  []Case Management         Disclaimer: Sections of this note are dictated utilizing voice recognition software, which may have resulted in some phonetic based errors in grammar and contents. Even though attempts were made to correct all the mistakes, some may have been missed, and remained in the body of the document. If questions arise, please contact our department.     Signed By: Zhao Mackey MD     December 10, 2022

## 2022-12-11 LAB
ANION GAP SERPL CALC-SCNC: 9 MMOL/L (ref 3–18)
BASOPHILS # BLD: 0 K/UL (ref 0–0.1)
BASOPHILS NFR BLD: 1 % (ref 0–2)
BUN SERPL-MCNC: 24 MG/DL (ref 7–18)
BUN/CREAT SERPL: 8 (ref 12–20)
CALCIUM SERPL-MCNC: 9.5 MG/DL (ref 8.5–10.1)
CHLORIDE SERPL-SCNC: 103 MMOL/L (ref 100–111)
CO2 SERPL-SCNC: 26 MMOL/L (ref 21–32)
CREAT SERPL-MCNC: 2.91 MG/DL (ref 0.6–1.3)
DIFFERENTIAL METHOD BLD: ABNORMAL
EOSINOPHIL # BLD: 0.1 K/UL (ref 0–0.4)
EOSINOPHIL NFR BLD: 2 % (ref 0–5)
ERYTHROCYTE [DISTWIDTH] IN BLOOD BY AUTOMATED COUNT: 17.3 % (ref 11.6–14.5)
GLUCOSE BLD STRIP.AUTO-MCNC: 133 MG/DL (ref 70–110)
GLUCOSE BLD STRIP.AUTO-MCNC: 137 MG/DL (ref 70–110)
GLUCOSE BLD STRIP.AUTO-MCNC: 166 MG/DL (ref 70–110)
GLUCOSE BLD STRIP.AUTO-MCNC: 167 MG/DL (ref 70–110)
GLUCOSE SERPL-MCNC: 153 MG/DL (ref 74–99)
HCT VFR BLD AUTO: 33.8 % (ref 35–45)
HGB BLD-MCNC: 10.3 G/DL (ref 12–16)
IMM GRANULOCYTES # BLD AUTO: 0.1 K/UL (ref 0–0.04)
IMM GRANULOCYTES NFR BLD AUTO: 2 % (ref 0–0.5)
LYMPHOCYTES # BLD: 1.9 K/UL (ref 0.9–3.6)
LYMPHOCYTES NFR BLD: 38 % (ref 21–52)
MCH RBC QN AUTO: 28.4 PG (ref 24–34)
MCHC RBC AUTO-ENTMCNC: 30.5 G/DL (ref 31–37)
MCV RBC AUTO: 93.1 FL (ref 78–100)
MONOCYTES # BLD: 0.8 K/UL (ref 0.05–1.2)
MONOCYTES NFR BLD: 15 % (ref 3–10)
NEUTS SEG # BLD: 2.2 K/UL (ref 1.8–8)
NEUTS SEG NFR BLD: 43 % (ref 40–73)
NRBC # BLD: 0.19 K/UL (ref 0–0.01)
NRBC BLD-RTO: 3.8 PER 100 WBC
PLATELET # BLD AUTO: 303 K/UL (ref 135–420)
PMV BLD AUTO: 10.2 FL (ref 9.2–11.8)
POTASSIUM SERPL-SCNC: 4.1 MMOL/L (ref 3.5–5.5)
RBC # BLD AUTO: 3.63 M/UL (ref 4.2–5.3)
SODIUM SERPL-SCNC: 138 MMOL/L (ref 136–145)
WBC # BLD AUTO: 5 K/UL (ref 4.6–13.2)

## 2022-12-11 PROCEDURE — 74011250636 HC RX REV CODE- 250/636: Performed by: STUDENT IN AN ORGANIZED HEALTH CARE EDUCATION/TRAINING PROGRAM

## 2022-12-11 PROCEDURE — 80048 BASIC METABOLIC PNL TOTAL CA: CPT

## 2022-12-11 PROCEDURE — 74011636637 HC RX REV CODE- 636/637: Performed by: STUDENT IN AN ORGANIZED HEALTH CARE EDUCATION/TRAINING PROGRAM

## 2022-12-11 PROCEDURE — 36415 COLL VENOUS BLD VENIPUNCTURE: CPT

## 2022-12-11 PROCEDURE — 99232 SBSQ HOSP IP/OBS MODERATE 35: CPT | Performed by: INTERNAL MEDICINE

## 2022-12-11 PROCEDURE — 85025 COMPLETE CBC W/AUTO DIFF WBC: CPT

## 2022-12-11 PROCEDURE — 82962 GLUCOSE BLOOD TEST: CPT

## 2022-12-11 PROCEDURE — 74011000250 HC RX REV CODE- 250: Performed by: STUDENT IN AN ORGANIZED HEALTH CARE EDUCATION/TRAINING PROGRAM

## 2022-12-11 PROCEDURE — 74011250637 HC RX REV CODE- 250/637: Performed by: STUDENT IN AN ORGANIZED HEALTH CARE EDUCATION/TRAINING PROGRAM

## 2022-12-11 PROCEDURE — 65270000046 HC RM TELEMETRY

## 2022-12-11 RX ADMIN — Medication 5 MG: at 21:02

## 2022-12-11 RX ADMIN — NYSTATIN: 100000 POWDER TOPICAL at 09:15

## 2022-12-11 RX ADMIN — SODIUM CHLORIDE, PRESERVATIVE FREE 10 ML: 5 INJECTION INTRAVENOUS at 06:30

## 2022-12-11 RX ADMIN — NYSTATIN: 100000 POWDER TOPICAL at 18:00

## 2022-12-11 RX ADMIN — HEPARIN SODIUM 5000 UNITS: 5000 INJECTION INTRAVENOUS; SUBCUTANEOUS at 06:30

## 2022-12-11 RX ADMIN — GABAPENTIN 400 MG: 400 CAPSULE ORAL at 09:10

## 2022-12-11 RX ADMIN — Medication 2 UNITS: at 07:30

## 2022-12-11 RX ADMIN — SODIUM CHLORIDE, PRESERVATIVE FREE 10 ML: 5 INJECTION INTRAVENOUS at 21:02

## 2022-12-11 RX ADMIN — HEPARIN SODIUM 5000 UNITS: 5000 INJECTION INTRAVENOUS; SUBCUTANEOUS at 21:01

## 2022-12-11 RX ADMIN — SODIUM CHLORIDE, PRESERVATIVE FREE 10 ML: 5 INJECTION INTRAVENOUS at 14:00

## 2022-12-11 RX ADMIN — OXYCODONE HYDROCHLORIDE AND ACETAMINOPHEN 1 TABLET: 5; 325 TABLET ORAL at 21:00

## 2022-12-11 RX ADMIN — METOPROLOL TARTRATE 25 MG: 25 TABLET, FILM COATED ORAL at 09:10

## 2022-12-11 RX ADMIN — OXYCODONE HYDROCHLORIDE AND ACETAMINOPHEN 1 TABLET: 5; 325 TABLET ORAL at 09:10

## 2022-12-11 RX ADMIN — HEPARIN SODIUM 5000 UNITS: 5000 INJECTION INTRAVENOUS; SUBCUTANEOUS at 16:27

## 2022-12-11 RX ADMIN — Medication 2 UNITS: at 16:27

## 2022-12-11 NOTE — ROUTINE PROCESS
0715- Bedside, Verbal shift change report received by Umang Schmidt (oncoming nurse) by Margarita(offgoing nurse). Report included the following information SBAR, Kardex, Intake/Output, MAR and Recent Results. 0830-Assessment completed, call bell within reach, no distress noted. 1020-AM  medications administered, pt tolerated with ease, will continue to monitor. PRN pain medications administered, pt tolerated with ease, will continue to monitor. Taken to dialysis via bed. 1520- returned from dialysis Shift reassessment, pt condition unchanged, will continue to monitor. 1915- Bedside, Verbal shift change report given to Reanna(oncoming nurse) by Umang Schmidt (offgoing nurse). Report included the following information SBAR, Kardex, Intake/Output, MAR and Recent Results. Skin assessment completed.

## 2022-12-11 NOTE — PROGRESS NOTES
RENAL DAILY PROGRESS NOTE              Subjective:       Complaint:     Overnight events noted  no nausea, vomiting, chest pain, short of breath, cough, seizure. IMPRESSION:   ESRD on HD TTS  Dizziness,lower ext weakness r/o PCA stroke  Vertebral artery stenosis on CTA neck  Cardiomyopathy, CAD  Secondary hyperparathyroidism  ACD   PLAN:   Iv venofer with dialysis  Tolerating HD well  Phos is ok  Follow vascular regarding stenosis in vertebral artery  If patient need IV contrast for CT imaging, need to be coordinated with renal team.   Avoid Gadolinium due to its association with nephrogenic systemic fibrosis in a patients with severe ARF and ESRD. Please dose all medications for creatinine clearance <15/dialysis. Avoid blood pressure checks, blood draws, peripheral iv's on arm with access. AvoidPICC lines on either arm in order to preserve veins for dialysis access creation.              Current Facility-Administered Medications   Medication Dose Route Frequency    iron sucrose (VENOFER) injection 100 mg  100 mg IntraVENous Q TUE, THU & SAT    albuterol-ipratropium (DUO-NEB) 2.5 MG-0.5 MG/3 ML  3 mL Nebulization Q6H PRN    gabapentin (NEURONTIN) capsule 400 mg  400 mg Oral DAILY    metoprolol tartrate (LOPRESSOR) tablet 25 mg  25 mg Oral BID    hydrALAZINE (APRESOLINE) tablet 100 mg  100 mg Oral BID    sodium chloride (NS) flush 5-40 mL  5-40 mL IntraVENous Q8H    sodium chloride (NS) flush 5-40 mL  5-40 mL IntraVENous PRN    acetaminophen (TYLENOL) tablet 650 mg  650 mg Oral Q6H PRN    Or    acetaminophen (TYLENOL) suppository 650 mg  650 mg Rectal Q6H PRN    polyethylene glycol (MIRALAX) packet 17 g  17 g Oral DAILY PRN    ondansetron (ZOFRAN ODT) tablet 4 mg  4 mg Oral Q8H PRN    Or    ondansetron (ZOFRAN) injection 4 mg  4 mg IntraVENous Q6H PRN    heparin (porcine) injection 5,000 Units  5,000 Units SubCUTAneous Q8H    glucose chewable tablet 16 g  4 Tablet Oral PRN    glucagon (GLUCAGEN) injection 1 mg  1 mg IntraMUSCular PRN    dextrose 10% infusion 0-250 mL  0-250 mL IntraVENous PRN    insulin lispro (HUMALOG) injection   SubCUTAneous AC&HS    nystatin (MYCOSTATIN) 100,000 unit/gram powder   Topical BID    oxyCODONE-acetaminophen (PERCOCET) 5-325 mg per tablet 1 Tablet  1 Tablet Oral Q6H PRN    melatonin tablet 5 mg  5 mg Oral QHS       Review of Symptoms: comprehensive ROS negative except above.    Objective:   Patient Vitals for the past 24 hrs:   Temp Pulse Resp BP SpO2   12/11/22 1301 97.8 °F (36.6 °C) 87 16 127/74 95 %   12/11/22 1200 -- 88 -- -- --   12/11/22 0829 98 °F (36.7 °C) 93 16 102/65 95 %   12/11/22 0800 -- 83 -- -- --   12/11/22 0446 97.9 °F (36.6 °C) 99 14 121/70 94 %   12/11/22 0014 98 °F (36.7 °C) 91 14 106/62 95 %   12/10/22 2015 98.3 °F (36.8 °C) (!) 105 16 115/61 94 %   12/10/22 1657 98.2 °F (36.8 °C) (!) 109 18 (!) 107/55 93 %          Weight change:      12/09 1901 - 12/11 0700  In: -   Out: 1661   No intake or output data in the 24 hours ending 12/11/22 1445    Physical Exam:   General: comfortable, no acute distress   HEENT sclera anicteric, supple neck, no thyromegaly  CVS: S1S2 heard,  no rub  RS: + air entry b/l,   Abd: Soft, Non tender, Not distended, Positive bowel sounds, no organomegaly, no CVA / supra pubic tenderness  Neuro: non focal, awake, alert , CN II-XII are grossly intact  Extrm:no edema, no cyanosis, clubbing   Skin: no visible  Rash  Musculoskeletal: No gross joints or bone deformities         Data Review:     LABS:   Hematology:   Recent Labs     12/11/22  0246 12/10/22  0427 12/09/22  1248   WBC 5.0 5.4 5.0   HGB 10.3* 10.2* 10.1*   HCT 33.8* 33.1* 33.2*       Chemistry:   Recent Labs     12/11/22  0246 12/10/22  0427 12/09/22  1248   BUN 24* 36* 33*   CREA 2.91* 2.48* 2.22*   CA 9.5 9.7  9.8 9.6   ALB  --   --  3.3*   K 4.1 4.4 4.3    138 137    102 103   CO2 26 27 26   PHOS  --  3.7  --    * 145* 137* Procedures/imaging: see electronic medical records for all procedures, Xrays and details which were not copied into this note but were reviewed prior to creation of Plan          Assessment & Plan:     See above        Maida Aguilar MD  12/11/2022

## 2022-12-11 NOTE — PROGRESS NOTES
[facility]  [facility]    Patient: Mark Shell Age: 81 y.o. : 1950 MR#: [id number] SSN: [id number]  Date/Time: 2022     C/C: dizziness      Subjective:   HPI : admitted for Dizziness ,           Review of Systems: Patient is lying in the bed comfortable, patient denies any chest pain or any new symptoms dizziness appears to be improved to some extent still awaiting to see vascular. positive responses in bold type   Constitutional: Negative for fever, chills, diaphoresis and unexpected weight change. HENT: Negative for ear pain, congestion, sore throat, rhinorrhea, drooling, trouble swallowing, neck pain and tinnitus. Eyes: Negative for photophobia, pain, redness and visual disturbance. Respiratory: negative for shortness of breath, cough, choking, chest tightness, wheezing or stridor. Cardiovascular: Negative for chest pain, palpitations and leg swelling. Gastrointestinal: Negative for nausea, vomiting, abdominal pain, diarrhea, constipation, blood in stool, abdominal distention and anal bleeding. Genitourinary: Negative for dysuria, urgency, frequency, hematuria, flank pain and difficulty urinating. Musculoskeletal: Negative for back pain and arthralgias. Skin: Negative for color change, rash and wound. Neurological: Negative for dizziness, seizures, syncope, speech difficulty, light-headedness or headaches. Hematological: Does not bruise/bleed easily. Psychiatric/Behavioral: Negative for suicidal ideas, hallucinations, behavioral problems, self-injury or agitation     Assessment/Plan:     1.  Dizziness   - CVA ruled out   2 Bilateral Vertebral artery stenosis   3 ESRD On HD   4 HTN  5 h/o CAD  6 DM2    PLAN     -Patient seen by vascular surgery  -Current plan is to check CTA and carotid Doppler  -Continue hemodialysis as planned    Objective:       General:  Alert, cooperative, no acute distress   HEENT: No facial asymmetry, TANIA Wily, External ears - WNL    Cardiovascular: S1S2 - regular , No Murmur   Pulmonary: Equal expansion , No Use of accessory muscles , No Rales No Rhonchi    GI:  +BS in all four quadrants, soft, non-tender  Extremities:  No edema; 2+ dorsalis pedis pulses bilaterally  Neuro: Alert and oriented X 2.        DVT Prophylaxis:  []Lovenox  []Hep SQ  []SCDs  []Coumadin   []On Heparin gtt    [] Eliquis [] Xarelto     Vitals:         VS: Visit Vitals  /65 (BP 1 Location: Left arm, BP Patient Position: At rest)   Pulse 93   Temp 98 °F (36.7 °C)   Resp 16   Ht 5' 4\" (1.626 m)   Wt 97.5 kg (215 lb)   SpO2 95%   BMI 36.90 kg/m²      Tmax/24hrs: Temp (24hrs), Av.1 °F (36.7 °C), Min:97.7 °F (36.5 °C), Max:98.4 °F (36.9 °C)        Medications:   Current Facility-Administered Medications   Medication Dose Route Frequency    iron sucrose (VENOFER) injection 100 mg  100 mg IntraVENous Q TUE, THU & SAT    albuterol-ipratropium (DUO-NEB) 2.5 MG-0.5 MG/3 ML  3 mL Nebulization Q6H PRN    gabapentin (NEURONTIN) capsule 400 mg  400 mg Oral DAILY    metoprolol tartrate (LOPRESSOR) tablet 25 mg  25 mg Oral BID    hydrALAZINE (APRESOLINE) tablet 100 mg  100 mg Oral BID    sodium chloride (NS) flush 5-40 mL  5-40 mL IntraVENous Q8H    sodium chloride (NS) flush 5-40 mL  5-40 mL IntraVENous PRN    acetaminophen (TYLENOL) tablet 650 mg  650 mg Oral Q6H PRN    Or    acetaminophen (TYLENOL) suppository 650 mg  650 mg Rectal Q6H PRN    polyethylene glycol (MIRALAX) packet 17 g  17 g Oral DAILY PRN    ondansetron (ZOFRAN ODT) tablet 4 mg  4 mg Oral Q8H PRN    Or    ondansetron (ZOFRAN) injection 4 mg  4 mg IntraVENous Q6H PRN    heparin (porcine) injection 5,000 Units  5,000 Units SubCUTAneous Q8H    glucose chewable tablet 16 g  4 Tablet Oral PRN    glucagon (GLUCAGEN) injection 1 mg  1 mg IntraMUSCular PRN    dextrose 10% infusion 0-250 mL  0-250 mL IntraVENous PRN    insulin lispro (HUMALOG) injection   SubCUTAneous AC&HS    nystatin (MYCOSTATIN) 100,000 unit/gram powder   Topical BID    oxyCODONE-acetaminophen (PERCOCET) 5-325 mg per tablet 1 Tablet  1 Tablet Oral Q6H PRN    melatonin tablet 5 mg  5 mg Oral QHS       Labs:    Recent Labs     12/11/22 0246 12/10/22  0427 12/09/22  1248   WBC 5.0 5.4 5.0   HGB 10.3* 10.2* 10.1*   HCT 33.8* 33.1* 33.2*    309 254     Recent Labs     12/11/22  0246 12/10/22  0427 12/09/22  1248 12/08/22  1225    138 137 134*   K 4.1 4.4 4.3 4.3    102 103 98*   CO2 26 27 26 28   * 145* 137* 282*   BUN 24* 36* 33* 36*   CREA 2.91* 2.48* 2.22* 3.45*   CA 9.5 9.7  9.8 9.6 9.3   MG  --   --   --  2.4   PHOS  --  3.7  --  4.1   ALB  --   --  3.3* 3.5   ALT  --   --  21 24         Time spent on direct patient care >30 mints     Complexity : High complex - due to multiple medical issues outlined above. CODE Status : Full CODE     Case discussed with:  [x]Patient  [] Family  []Nursing  []Case Management         Disclaimer: Sections of this note are dictated utilizing voice recognition software, which may have resulted in some phonetic based errors in grammar and contents. Even though attempts were made to correct all the mistakes, some may have been missed, and remained in the body of the document. If questions arise, please contact our department.     Signed By: Magdalena Warren MD     December 11, 2022

## 2022-12-11 NOTE — CONSULTS
Nicho Roque    No chief complaint on file. History and Physical    Saw patient at bedside today. Her  was on speaker phone during the exam.  He was helpful with interview. She is alert and oriented x3. Her and her  say she was going to dialysis and was felt to be weak and passed out. This prompted her coming to the hospital.  She has a history of diabetes, renal failure with right chest wall catheter, obesity, orthostatic hypotension, dilated cardiomyopathy, congestive heart failure, history of MI. Past Medical History:   Diagnosis Date    Abnormal WBC count 5/17/2016    Anemia     Bilateral shoulder pain 9/27/2016    Chondromalacia of both patellae     COPD (chronic obstructive pulmonary disease) ([facility] [address] 9/2015    mild-mod dz; Dr Abner Heredia    Diabetes [facility]) 2013    Diabetic eye exam ([facility] [address] 2016    Dilated cardiomyopathy ([facility] [address]     Dyslipidemia     Gout     Heart attack ([facility] [address] 10/18/2019    History of echocardiogram 11/14/2014    Mild LVE. EF 40%. Mild, diffuse hypk. Mild LAE. Mild MR.       Hypercholesteremia 1/08/14    Hypertension 2000    Noncompliance with medications 2/16/2016    Obesity     Orthostatic hypotension 5/17/2016    Osteoarthritis of both knees     Pain management 04/01/2016    Dr. Valery Davis     Popliteal cyst, bilateral      Stage 3b chronic kidney disease ([facility] [address] 8/8/2022    Vitamin D deficiency 10/16/14     Patient Active Problem List   Diagnosis Code    Cardiomyopathy, dilated ([facility] [address] I42.0    Environmental allergies Z91.09    Chronic obstructive pulmonary disease with acute exacerbation (HCC) J44.1    Essential hypertension I10    Iron deficiency anemia D50.9    Decreased GFR R94.4    Dyslipidemia, goal LDL below 70 E78.5    Former smoker Z87.891    Osteoarthritis of both knees M17.0    Chronic pain of both knees M25.561, M25.562, G89.29    Chronic pain syndrome G89.4    Primary osteoarthritis of both knees M17.0    Chronic obstructive pulmonary disease ([address] J44.9 Compliance with medication regimen Z91.89    Bilateral shoulder pain M25.511, M25.512    Elevated alkaline phosphatase level R74.8    Caregiver stress Z63.6    STEMI (ST elevation myocardial infarction) (HCC) I21.3    Acute pulmonary edema (HCC) J81.0    Coronary artery disease involving native coronary artery of native heart without angina pectoris I25.10    Pulmonary edema J81.1    SOB (shortness of breath) R06.02    Coronary artery disease involving native coronary artery with unstable angina pectoris (HCC) I25.110    Type 2 diabetes with nephropathy (HCC) E11.21    Severe obesity (BMI 35.0-39. 9) with comorbidity ([facility]) E66.01    COPD exacerbation ([facility]) J44.1    Combined systolic and diastolic congestive heart failure (HCC) I50.40    Type 2 diabetes mellitus with hyperglycemia ([facility]) E11.65    TESSA on CPAP G47.33, Z99.89    Ischemic cardiomyopathy I25.5    Syncope R55    Flash pulmonary edema (HCC) J81.0    Elevated d-dimer R79.89    Acute respiratory failure with hypoxia ([facility]) J96.01    Pulmonary edema cardiac cause ([facility]) I50.1    COPD with acute exacerbation (HCC) J44.1    Respiratory failure requiring intubation (HCC) J96.90    CAD (coronary artery disease) I25.10    Chronic systolic congestive heart failure (HCC) I50.22    Nonrheumatic mitral valve regurgitation I34.0    Sepsis (HCC) A41.9    Hypotension I95.9    Stage 1 acute kidney injury (HCC) N17.9    Hyperkalemia E87.5    Acute hyperkalemia E87.5    Breast cancer (HCC) C50.919    Rectal cancer (HCC) C20    History of myocardial infarction I25.2    Pulmonary hypertension (HCC) I27.20    Acute exacerbation of CHF (congestive heart failure) (HCC) I50.9    Pneumonia due to COVID-19 virus U07.1, J12.82    Acute on chronic diastolic heart failure (HCC) I50.33    Stage 3b chronic kidney disease (HCC) N18.32    CHF (congestive heart failure) (HCC) I50.9    CVA (cerebral vascular accident) ([facility] [address] I63.9    ESRD (end stage renal disease) on dialysis ([facility] [address] N18.6, Z99.2 TIA (transient ischemic attack) G45.9    Dizziness R42    Stenosis of both vertebral arteries I65.03     Past Surgical History:   Procedure Laterality Date    HX HEART CATHETERIZATION      HX TUBAL LIGATION      IR INSERT TUNL CVC W/O PORT OVER 5 YR  9/21/2022     Current Facility-Administered Medications   Medication Dose Route Frequency Provider Last Rate Last Admin    iron sucrose (VENOFER) injection 100 mg  100 mg IntraVENous Q TUE, THU & SAT Gracia Farrell MD   100 mg at 12/10/22 2135    albuterol-ipratropium (DUO-NEB) 2.5 MG-0.5 MG/3 ML  3 mL Nebulization Q6H PRN Celine Serna, DO        gabapentin (NEURONTIN) capsule 400 mg  400 mg Oral DAILY Cleine Serna, DO   400 mg at 12/11/22 0910    metoprolol tartrate (LOPRESSOR) tablet 25 mg  25 mg Oral BID Celine Serna, DO   25 mg at 12/11/22 0910    hydrALAZINE (APRESOLINE) tablet 100 mg  100 mg Oral BID Celine Alba, DO   100 mg at 12/10/22 1842    sodium chloride (NS) flush 5-40 mL  5-40 mL IntraVENous Q8H Celine Serna, DO   10 mL at 12/11/22 0630    sodium chloride (NS) flush 5-40 mL  5-40 mL IntraVENous PRN Celine Serna, DO        acetaminophen (TYLENOL) tablet 650 mg  650 mg Oral Q6H PRN Celine Serna,         Or    acetaminophen (TYLENOL) suppository 650 mg  650 mg Rectal Q6H PRN Celine Serna, DO        polyethylene glycol (MIRALAX) packet 17 g  17 g Oral DAILY PRN Celine Serna, DO        ondansetron (ZOFRAN ODT) tablet 4 mg  4 mg Oral Q8H PRN Celine Serna, DO        Or    ondansetron (ZOFRAN) injection 4 mg  4 mg IntraVENous Q6H PRN Celine Serna, DO        heparin (porcine) injection 5,000 Units  5,000 Units SubCUTAneous Q8H Celine Serna, DO   5,000 Units at 12/11/22 0630    glucose chewable tablet 16 g  4 Tablet Oral PRN Celine Serna, DO        glucagon (GLUCAGEN) injection 1 mg  1 mg IntraMUSCular PRN Celine Serna, DO        dextrose 10% infusion 0-250 mL  0-250 mL IntraVENous PRN Debora Love N, DO        insulin lispro (HUMALOG) injection   SubCUTAneous AC&HS Josh Bernabe N, DO   2 Units at 12/11/22 0730    nystatin (MYCOSTATIN) 100,000 unit/gram powder   Topical BID Unique Gage,    Given at 12/11/22 0915    oxyCODONE-acetaminophen (PERCOCET) 5-325 mg per tablet 1 Tablet  1 Tablet Oral Q6H PRN Josh MARIE, DO   1 Tablet at 12/11/22 0910    melatonin tablet 5 mg  5 mg Oral QHS Celine Serna N, DO   5 mg at 12/10/22 2135     No Known Allergies  Social History     Socioeconomic History    Marital status:      Spouse name: Not on file    Number of children: 3    Years of education: Not on file    Highest education level: Not on file   Occupational History    Occupation: retired   Tobacco Use    Smoking status: Former     Packs/day: 1.00     Years: 52.00     Pack years: 52.00     Types: Cigarettes     Quit date: 5/20/2019     Years since quitting: 3.5    Smokeless tobacco: Never   Substance and Sexual Activity    Alcohol use: No     Alcohol/week: 0.0 standard drinks    Drug use: No    Sexual activity: Yes     Partners: Male   Other Topics Concern     Service No    Blood Transfusions No    Caffeine Concern No    Occupational Exposure No    Hobby Hazards No    Sleep Concern No    Stress Concern No    Weight Concern No    Special Diet No    Back Care No    Exercise No    Bike Helmet No    Seat Belt Yes    Self-Exams Yes   Social History Narrative    Not on file     Social Determinants of Health     Financial Resource Strain: Not on file   Food Insecurity: Not on file   Transportation Needs: Not on file   Physical Activity: Not on file   Stress: Not on file   Social Connections: Not on file   Intimate Partner Violence: Not on file   Housing Stability: Not on file      Family History   Problem Relation Age of Onset    Diabetes Mother     Hypertension Mother     Hypertension Father     Kidney Disease Maternal Aunt 50        Dialysis       Review of Systems    A full review of systems was completed times ten organ systems and was deemed negative unless otherwise mentioned in the HPI. Physical Exam:    Visit Vitals  /74 (BP 1 Location: Left arm, BP Patient Position: At rest)   Pulse 87   Temp 97.8 °F (36.6 °C)   Resp 16   Ht 5' 4\" (1.626 m)   Wt 97.5 kg (215 lb)   SpO2 95%   BMI 36.90 kg/m²      She is alert and oriented x3 she is tolerating her lunch and talking with her  on the phone  Head is normocephalic I do not see any facial symmetry  She has had previous eye surgeries tells me her vision is unreliable  Neck no JVD  Chest is clear  Cardiac is regular  Abdomen soft flat nontender obese  Palpable pulse on the right foot hard to feel on the left but warm and nonischemic appearing      Impression and Plan:  Weakness and syncopal event  Will check carotid Doppler  CTA mentions bilateral vertebral disease    Carlo Durbin MD    PLEASE NOTE:  This document has been produced using voice recognition software. Unrecognized errors in transcription may be present.

## 2022-12-11 NOTE — ROUTINE PROCESS
Bedside and Verbal shift change report given to [facility] KAYLA VIZCAINO RN (oncoming nurse) by Rashmi Moise RN (offgoing nurse). Report included the following information SBAR, Kardex, and MAR.

## 2022-12-12 ENCOUNTER — APPOINTMENT (OUTPATIENT)
Dept: VASCULAR SURGERY | Age: 72
DRG: 067 | End: 2022-12-12
Attending: SURGERY
Payer: MEDICARE

## 2022-12-12 LAB
ANION GAP SERPL CALC-SCNC: 8 MMOL/L (ref 3–18)
BASOPHILS # BLD: 0.1 K/UL (ref 0–0.1)
BASOPHILS NFR BLD: 1 % (ref 0–2)
BUN SERPL-MCNC: 41 MG/DL (ref 7–18)
BUN/CREAT SERPL: 13 (ref 12–20)
CALCIUM SERPL-MCNC: 9.8 MG/DL (ref 8.5–10.1)
CHLORIDE SERPL-SCNC: 102 MMOL/L (ref 100–111)
CO2 SERPL-SCNC: 26 MMOL/L (ref 21–32)
CREAT SERPL-MCNC: 3.09 MG/DL (ref 0.6–1.3)
DIFFERENTIAL METHOD BLD: ABNORMAL
EOSINOPHIL # BLD: 0.1 K/UL (ref 0–0.4)
EOSINOPHIL NFR BLD: 2 % (ref 0–5)
ERYTHROCYTE [DISTWIDTH] IN BLOOD BY AUTOMATED COUNT: 17.4 % (ref 11.6–14.5)
GLUCOSE BLD STRIP.AUTO-MCNC: 146 MG/DL (ref 70–110)
GLUCOSE BLD STRIP.AUTO-MCNC: 150 MG/DL (ref 70–110)
GLUCOSE BLD STRIP.AUTO-MCNC: 156 MG/DL (ref 70–110)
GLUCOSE BLD STRIP.AUTO-MCNC: 196 MG/DL (ref 70–110)
GLUCOSE SERPL-MCNC: 170 MG/DL (ref 74–99)
HBV SURFACE AB SER QL IA: POSITIVE
HBV SURFACE AB SERPL IA-ACNC: >1000 MIU/ML
HCT VFR BLD AUTO: 35 % (ref 35–45)
HEP BS AB COMMENT,HBSAC: NORMAL
HGB BLD-MCNC: 10 G/DL (ref 12–16)
IMM GRANULOCYTES # BLD AUTO: 0.2 K/UL (ref 0–0.04)
IMM GRANULOCYTES NFR BLD AUTO: 3 % (ref 0–0.5)
LEFT BULB EDV: 18.9 CM/S
LEFT BULB PSV: 93.8 CM/S
LEFT CCA DIST DIAS: 12.3 CM/S
LEFT CCA DIST SYS: 46.2 CM/S
LEFT CCA MID DIAS: 30.75 CM/S
LEFT CCA MID SYS: 117.49 CM/S
LEFT CCA PROX DIAS: 30.8 CM/S
LEFT CCA PROX SYS: 123.4 CM/S
LEFT ECA DIAS: 10.4 CM/S
LEFT ECA SYS: 112.3 CM/S
LEFT ICA DIST DIAS: 43.5 CM/S
LEFT ICA DIST SYS: 110.3 CM/S
LEFT ICA MID DIAS: 32.3 CM/S
LEFT ICA MID SYS: 104.7 CM/S
LEFT ICA PROX DIAS: 43.5 CM/S
LEFT ICA PROX SYS: 174.4 CM/S
LEFT ICA/CCA SYS: 3.77 NO UNITS
LEFT VERTEBRAL DIAS: 0 CM/S
LEFT VERTEBRAL SYS: 30.4 CM/S
LYMPHOCYTES # BLD: 2.1 K/UL (ref 0.9–3.6)
LYMPHOCYTES NFR BLD: 36 % (ref 21–52)
MCH RBC QN AUTO: 28.3 PG (ref 24–34)
MCHC RBC AUTO-ENTMCNC: 28.6 G/DL (ref 31–37)
MCV RBC AUTO: 99.2 FL (ref 78–100)
MONOCYTES # BLD: 0.7 K/UL (ref 0.05–1.2)
MONOCYTES NFR BLD: 12 % (ref 3–10)
NEUTS SEG # BLD: 2.7 K/UL (ref 1.8–8)
NEUTS SEG NFR BLD: 46 % (ref 40–73)
NRBC # BLD: 0.18 K/UL (ref 0–0.01)
NRBC BLD-RTO: 3.1 PER 100 WBC
PLATELET # BLD AUTO: 266 K/UL (ref 135–420)
PLATELET COMMENTS,PCOM: ABNORMAL
PMV BLD AUTO: 10.2 FL (ref 9.2–11.8)
POTASSIUM SERPL-SCNC: 4.4 MMOL/L (ref 3.5–5.5)
RBC # BLD AUTO: 3.53 M/UL (ref 4.2–5.3)
RBC MORPH BLD: ABNORMAL
RBC MORPH BLD: ABNORMAL
RIGHT BULB EDV: 19.2 CM/S
RIGHT BULB PSV: 67.7 CM/S
RIGHT CCA DIST DIAS: 23.6 CM/S
RIGHT CCA DIST SYS: 86.6 CM/S
RIGHT CCA MID DIAS: 17.12 CM/S
RIGHT CCA MID SYS: 75.28 CM/S
RIGHT CCA PROX DIAS: 17.1 CM/S
RIGHT CCA PROX SYS: 73.7 CM/S
RIGHT ECA DIAS: 34.16 CM/S
RIGHT ECA SYS: 329.2 CM/S
RIGHT ICA DIST DIAS: 22.8 CM/S
RIGHT ICA DIST SYS: 48.7 CM/S
RIGHT ICA MID DIAS: 26.8 CM/S
RIGHT ICA MID SYS: 84 CM/S
RIGHT ICA PROX DIAS: 20.9 CM/S
RIGHT ICA PROX SYS: 78.1 CM/S
RIGHT ICA/CCA SYS: 1 NO UNITS
RIGHT VERTEBRAL DIAS: 21.48 CM/S
RIGHT VERTEBRAL SYS: 66.8 CM/S
SODIUM SERPL-SCNC: 136 MMOL/L (ref 136–145)
VAS LEFT SUBCLAVIAN PROX EDV: 13.7 CM/S
VAS LEFT SUBCLAVIAN PROX PSV: 147.4 CM/S
VAS RIGHT SUBCLAVIAN PROX EDV: 0 CM/S
VAS RIGHT SUBCLAVIAN PROX PSV: 107.6 CM/S
WBC # BLD AUTO: 5.9 K/UL (ref 4.6–13.2)

## 2022-12-12 PROCEDURE — 80048 BASIC METABOLIC PNL TOTAL CA: CPT

## 2022-12-12 PROCEDURE — 82962 GLUCOSE BLOOD TEST: CPT

## 2022-12-12 PROCEDURE — 65270000046 HC RM TELEMETRY

## 2022-12-12 PROCEDURE — 74011000250 HC RX REV CODE- 250: Performed by: STUDENT IN AN ORGANIZED HEALTH CARE EDUCATION/TRAINING PROGRAM

## 2022-12-12 PROCEDURE — 99232 SBSQ HOSP IP/OBS MODERATE 35: CPT | Performed by: HOSPITALIST

## 2022-12-12 PROCEDURE — 74011636637 HC RX REV CODE- 636/637: Performed by: STUDENT IN AN ORGANIZED HEALTH CARE EDUCATION/TRAINING PROGRAM

## 2022-12-12 PROCEDURE — 74011250637 HC RX REV CODE- 250/637: Performed by: STUDENT IN AN ORGANIZED HEALTH CARE EDUCATION/TRAINING PROGRAM

## 2022-12-12 PROCEDURE — 93880 EXTRACRANIAL BILAT STUDY: CPT

## 2022-12-12 PROCEDURE — 74011250636 HC RX REV CODE- 250/636: Performed by: STUDENT IN AN ORGANIZED HEALTH CARE EDUCATION/TRAINING PROGRAM

## 2022-12-12 PROCEDURE — 2709999900 HC NON-CHARGEABLE SUPPLY

## 2022-12-12 PROCEDURE — 85025 COMPLETE CBC W/AUTO DIFF WBC: CPT

## 2022-12-12 PROCEDURE — 36415 COLL VENOUS BLD VENIPUNCTURE: CPT

## 2022-12-12 RX ADMIN — HEPARIN SODIUM 5000 UNITS: 5000 INJECTION INTRAVENOUS; SUBCUTANEOUS at 05:48

## 2022-12-12 RX ADMIN — Medication 2 UNITS: at 17:30

## 2022-12-12 RX ADMIN — OXYCODONE HYDROCHLORIDE AND ACETAMINOPHEN 1 TABLET: 5; 325 TABLET ORAL at 16:16

## 2022-12-12 RX ADMIN — GABAPENTIN 400 MG: 400 CAPSULE ORAL at 08:55

## 2022-12-12 RX ADMIN — SODIUM CHLORIDE, PRESERVATIVE FREE 10 ML: 5 INJECTION INTRAVENOUS at 14:04

## 2022-12-12 RX ADMIN — HYDRALAZINE HYDROCHLORIDE 100 MG: 50 TABLET, FILM COATED ORAL at 08:55

## 2022-12-12 RX ADMIN — SODIUM CHLORIDE, PRESERVATIVE FREE 10 ML: 5 INJECTION INTRAVENOUS at 05:49

## 2022-12-12 RX ADMIN — HYDRALAZINE HYDROCHLORIDE 100 MG: 50 TABLET, FILM COATED ORAL at 18:47

## 2022-12-12 RX ADMIN — METOPROLOL TARTRATE 25 MG: 25 TABLET, FILM COATED ORAL at 08:55

## 2022-12-12 RX ADMIN — Medication 2 UNITS: at 21:29

## 2022-12-12 RX ADMIN — HEPARIN SODIUM 5000 UNITS: 5000 INJECTION INTRAVENOUS; SUBCUTANEOUS at 21:28

## 2022-12-12 RX ADMIN — NYSTATIN: 100000 POWDER TOPICAL at 18:47

## 2022-12-12 RX ADMIN — Medication 2 UNITS: at 12:06

## 2022-12-12 RX ADMIN — METOPROLOL TARTRATE 25 MG: 25 TABLET, FILM COATED ORAL at 18:47

## 2022-12-12 RX ADMIN — NYSTATIN: 100000 POWDER TOPICAL at 11:15

## 2022-12-12 RX ADMIN — HEPARIN SODIUM 5000 UNITS: 5000 INJECTION INTRAVENOUS; SUBCUTANEOUS at 14:04

## 2022-12-12 RX ADMIN — Medication 5 MG: at 21:28

## 2022-12-12 RX ADMIN — SODIUM CHLORIDE, PRESERVATIVE FREE 10 ML: 5 INJECTION INTRAVENOUS at 21:28

## 2022-12-12 RX ADMIN — OXYCODONE HYDROCHLORIDE AND ACETAMINOPHEN 1 TABLET: 5; 325 TABLET ORAL at 08:59

## 2022-12-12 NOTE — PROGRESS NOTES
[facility]  Progress Note    Patient: Preeti Armas Age: 81 y.o. : 1950 MR#: [id number] SSN: [id number]  Date/Time: 2022     C/C: dizziness    Patient seen and evaluated, lying in bed, no acute distress. Patient underwent carotid ultrasound today, results reviewed. Subjective:   HPI : admitted for Dizziness ,           Review of Systems: Patient is lying in the bed comfortable, patient denies any chest pain or any new symptoms dizziness appears to be improved to some extent still awaiting to see vascular. positive responses in bold type   Constitutional: Negative for fever, chills, diaphoresis and unexpected weight change. HENT: Negative for ear pain, congestion, sore throat, rhinorrhea, drooling, trouble swallowing, neck pain and tinnitus. Eyes: Negative for photophobia, pain, redness and visual disturbance. Respiratory: negative for shortness of breath, cough, choking, chest tightness, wheezing or stridor. Cardiovascular: Negative for chest pain, palpitations and leg swelling. Gastrointestinal: Negative for nausea, vomiting, abdominal pain, diarrhea, constipation, blood in stool, abdominal distention and anal bleeding. Genitourinary: Negative for dysuria, urgency, frequency, hematuria, flank pain and difficulty urinating. Musculoskeletal: Negative for back pain and arthralgias. Skin: Negative for color change, rash and wound. Neurological: Negative for dizziness, seizures, syncope, speech difficulty, light-headedness or headaches. Hematological: Does not bruise/bleed easily. Psychiatric/Behavioral: Negative for suicidal ideas, hallucinations, behavioral problems, self-injury or agitation     Assessment/Plan:     1.  Dizziness   - CVA ruled out   2 Bilateral Vertebral artery stenosis   3 ESRD On HD   4 HTN  5 h/o CAD  6 DM2    PLAN     -Patient seen by vascular surgery  -Carotid ultrasound results reviewed, will defer further management to vascular surgery.  -Continue hemodialysis as planned    PT and OT eval    Objective:       General:  Alert, cooperative, no acute distress   HEENT: No facial asymmetry, TANIA Wily, External ears - WNL    Cardiovascular: S1S2 - regular , No Murmur   Pulmonary: Equal expansion , No Use of accessory muscles , No Rales No Rhonchi    GI:  +BS in all four quadrants, soft, non-tender  Extremities:  No edema; 2+ dorsalis pedis pulses bilaterally  Neuro: Alert and oriented X 2.        DVT Prophylaxis:  []Lovenox  []Hep SQ  []SCDs  []Coumadin   []On Heparin gtt    [] Eliquis [] Xarelto     Vitals:         VS: Visit Vitals  /66   Pulse 88   Temp 97.9 °F (36.6 °C)   Resp 18   Ht 5' 4\" (1.626 m)   Wt 97.8 kg (215 lb 8 oz)   SpO2 95%   BMI 36.99 kg/m²        Tmax/24hrs: Temp (24hrs), Av.4 °F (36.9 °C), Min:97.9 °F (36.6 °C), Max:98.8 °F (37.1 °C)        Medications:   Current Facility-Administered Medications   Medication Dose Route Frequency    iron sucrose (VENOFER) injection 100 mg  100 mg IntraVENous Q TUE, THU & SAT    albuterol-ipratropium (DUO-NEB) 2.5 MG-0.5 MG/3 ML  3 mL Nebulization Q6H PRN    gabapentin (NEURONTIN) capsule 400 mg  400 mg Oral DAILY    metoprolol tartrate (LOPRESSOR) tablet 25 mg  25 mg Oral BID    hydrALAZINE (APRESOLINE) tablet 100 mg  100 mg Oral BID    sodium chloride (NS) flush 5-40 mL  5-40 mL IntraVENous Q8H    sodium chloride (NS) flush 5-40 mL  5-40 mL IntraVENous PRN    acetaminophen (TYLENOL) tablet 650 mg  650 mg Oral Q6H PRN    Or    acetaminophen (TYLENOL) suppository 650 mg  650 mg Rectal Q6H PRN    polyethylene glycol (MIRALAX) packet 17 g  17 g Oral DAILY PRN    ondansetron (ZOFRAN ODT) tablet 4 mg  4 mg Oral Q8H PRN    Or    ondansetron (ZOFRAN) injection 4 mg  4 mg IntraVENous Q6H PRN    heparin (porcine) injection 5,000 Units  5,000 Units SubCUTAneous Q8H    glucose chewable tablet 16 g  4 Tablet Oral PRN    glucagon (GLUCAGEN) injection 1 mg  1 mg IntraMUSCular PRN    dextrose 10% infusion 0-250 mL  0-250 mL IntraVENous PRN    insulin lispro (HUMALOG) injection   SubCUTAneous AC&HS    nystatin (MYCOSTATIN) 100,000 unit/gram powder   Topical BID    oxyCODONE-acetaminophen (PERCOCET) 5-325 mg per tablet 1 Tablet  1 Tablet Oral Q6H PRN    melatonin tablet 5 mg  5 mg Oral QHS       Labs:    Recent Labs     12/12/22  0435 12/11/22  0246 12/10/22  0427   WBC 5.9 5.0 5.4   HGB 10.0* 10.3* 10.2*   HCT 35.0 33.8* 33.1*    303 309       Recent Labs     12/11/22  0246 12/10/22  0427    138   K 4.1 4.4    102   CO2 26 27   * 145*   BUN 24* 36*   CREA 2.91* 2.48*   CA 9.5 9.7  9.8   PHOS  --  3.7           Time spent on direct patient care >30 mints     Complexity : High complex - due to multiple medical issues outlined above. CODE Status : Full CODE     Case discussed with:  [x]Patient  [] Family  []Nursing  []Case Management         Disclaimer: Sections of this note are dictated utilizing voice recognition software, which may have resulted in some phonetic based errors in grammar and contents. Even though attempts were made to correct all the mistakes, some may have been missed, and remained in the body of the document. If questions arise, please contact our department.     Signed By: Yamile Winkler MD     December 12, 2022

## 2022-12-12 NOTE — PROGRESS NOTES
Problem: Diabetes Self-Management  Goal: *Disease process and treatment process  Description: Define diabetes and identify own type of diabetes; list 3 options for treating diabetes.   Outcome: Progressing Towards Goal     Problem: Patient Education: Go to Patient Education Activity  Goal: Patient/Family Education  Outcome: Progressing Towards Goal     Problem: Patient Education: Go to Patient Education Activity  Goal: Patient/Family Education  Outcome: Progressing Towards Goal     Problem: Patient Education: Go to Patient Education Activity  Goal: Patient/Family Education  Outcome: Progressing Towards Goal     Problem: Patient Education: Go to Patient Education Activity  Goal: Patient/Family Education  Outcome: Progressing Towards Goal     Problem: Patient Education: Go to Patient Education Activity  Goal: Patient/Family Education  Outcome: Progressing Towards Goal

## 2022-12-13 VITALS
SYSTOLIC BLOOD PRESSURE: 127 MMHG | WEIGHT: 215.5 LBS | BODY MASS INDEX: 36.79 KG/M2 | HEIGHT: 64 IN | RESPIRATION RATE: 18 BRPM | HEART RATE: 99 BPM | OXYGEN SATURATION: 95 % | TEMPERATURE: 98.8 F | DIASTOLIC BLOOD PRESSURE: 81 MMHG

## 2022-12-13 LAB
ANION GAP SERPL CALC-SCNC: 9 MMOL/L (ref 3–18)
BASOPHILS # BLD: 0 K/UL (ref 0–0.1)
BASOPHILS NFR BLD: 1 % (ref 0–2)
BUN SERPL-MCNC: 43 MG/DL (ref 7–18)
BUN/CREAT SERPL: 15 (ref 12–20)
CALCIUM SERPL-MCNC: 9.8 MG/DL (ref 8.5–10.1)
CHLORIDE SERPL-SCNC: 103 MMOL/L (ref 100–111)
CO2 SERPL-SCNC: 26 MMOL/L (ref 21–32)
CREAT SERPL-MCNC: 2.92 MG/DL (ref 0.6–1.3)
DIFFERENTIAL METHOD BLD: ABNORMAL
EOSINOPHIL # BLD: 0.1 K/UL (ref 0–0.4)
EOSINOPHIL NFR BLD: 2 % (ref 0–5)
ERYTHROCYTE [DISTWIDTH] IN BLOOD BY AUTOMATED COUNT: 17.2 % (ref 11.6–14.5)
GLUCOSE BLD STRIP.AUTO-MCNC: 138 MG/DL (ref 70–110)
GLUCOSE BLD STRIP.AUTO-MCNC: 150 MG/DL (ref 70–110)
GLUCOSE BLD STRIP.AUTO-MCNC: 152 MG/DL (ref 70–110)
GLUCOSE SERPL-MCNC: 154 MG/DL (ref 74–99)
HCT VFR BLD AUTO: 32.4 % (ref 35–45)
HGB BLD-MCNC: 9.7 G/DL (ref 12–16)
IMM GRANULOCYTES # BLD AUTO: 0.1 K/UL (ref 0–0.04)
IMM GRANULOCYTES NFR BLD AUTO: 3 % (ref 0–0.5)
LYMPHOCYTES # BLD: 2 K/UL (ref 0.9–3.6)
LYMPHOCYTES NFR BLD: 36 % (ref 21–52)
MCH RBC QN AUTO: 28.3 PG (ref 24–34)
MCHC RBC AUTO-ENTMCNC: 29.9 G/DL (ref 31–37)
MCV RBC AUTO: 94.5 FL (ref 78–100)
MONOCYTES # BLD: 0.6 K/UL (ref 0.05–1.2)
MONOCYTES NFR BLD: 11 % (ref 3–10)
NEUTS SEG # BLD: 2.7 K/UL (ref 1.8–8)
NEUTS SEG NFR BLD: 48 % (ref 40–73)
NRBC # BLD: 0.13 K/UL (ref 0–0.01)
NRBC BLD-RTO: 2.3 PER 100 WBC
PLATELET # BLD AUTO: 284 K/UL (ref 135–420)
PMV BLD AUTO: 9.7 FL (ref 9.2–11.8)
POTASSIUM SERPL-SCNC: 4.4 MMOL/L (ref 3.5–5.5)
RBC # BLD AUTO: 3.43 M/UL (ref 4.2–5.3)
SODIUM SERPL-SCNC: 138 MMOL/L (ref 136–145)
WBC # BLD AUTO: 5.6 K/UL (ref 4.6–13.2)

## 2022-12-13 PROCEDURE — 74011000250 HC RX REV CODE- 250: Performed by: STUDENT IN AN ORGANIZED HEALTH CARE EDUCATION/TRAINING PROGRAM

## 2022-12-13 PROCEDURE — 82962 GLUCOSE BLOOD TEST: CPT

## 2022-12-13 PROCEDURE — 94762 N-INVAS EAR/PLS OXIMTRY CONT: CPT

## 2022-12-13 PROCEDURE — 90935 HEMODIALYSIS ONE EVALUATION: CPT

## 2022-12-13 PROCEDURE — 36415 COLL VENOUS BLD VENIPUNCTURE: CPT

## 2022-12-13 PROCEDURE — 80048 BASIC METABOLIC PNL TOTAL CA: CPT

## 2022-12-13 PROCEDURE — 85025 COMPLETE CBC W/AUTO DIFF WBC: CPT

## 2022-12-13 PROCEDURE — 74011250636 HC RX REV CODE- 250/636: Performed by: STUDENT IN AN ORGANIZED HEALTH CARE EDUCATION/TRAINING PROGRAM

## 2022-12-13 PROCEDURE — 74011636637 HC RX REV CODE- 636/637: Performed by: STUDENT IN AN ORGANIZED HEALTH CARE EDUCATION/TRAINING PROGRAM

## 2022-12-13 PROCEDURE — 2709999900 HC NON-CHARGEABLE SUPPLY

## 2022-12-13 PROCEDURE — 74011250637 HC RX REV CODE- 250/637: Performed by: STUDENT IN AN ORGANIZED HEALTH CARE EDUCATION/TRAINING PROGRAM

## 2022-12-13 RX ORDER — HEPARIN SODIUM 1000 [USP'U]/ML
INJECTION, SOLUTION INTRAVENOUS; SUBCUTANEOUS
Status: DISCONTINUED
Start: 2022-12-13 | End: 2022-12-13 | Stop reason: HOSPADM

## 2022-12-13 RX ADMIN — GABAPENTIN 400 MG: 400 CAPSULE ORAL at 08:52

## 2022-12-13 RX ADMIN — Medication 2 UNITS: at 16:03

## 2022-12-13 RX ADMIN — SODIUM CHLORIDE, PRESERVATIVE FREE 10 ML: 5 INJECTION INTRAVENOUS at 17:02

## 2022-12-13 RX ADMIN — Medication 2 UNITS: at 06:31

## 2022-12-13 RX ADMIN — Medication 2 UNITS: at 07:30

## 2022-12-13 RX ADMIN — METOPROLOL TARTRATE 25 MG: 25 TABLET, FILM COATED ORAL at 17:05

## 2022-12-13 RX ADMIN — OXYCODONE HYDROCHLORIDE AND ACETAMINOPHEN 1 TABLET: 5; 325 TABLET ORAL at 12:27

## 2022-12-13 RX ADMIN — HYDRALAZINE HYDROCHLORIDE 100 MG: 50 TABLET, FILM COATED ORAL at 17:05

## 2022-12-13 RX ADMIN — HEPARIN SODIUM 5000 UNITS: 5000 INJECTION INTRAVENOUS; SUBCUTANEOUS at 17:03

## 2022-12-13 RX ADMIN — SODIUM CHLORIDE, PRESERVATIVE FREE 10 ML: 5 INJECTION INTRAVENOUS at 06:12

## 2022-12-13 RX ADMIN — HEPARIN SODIUM 5000 UNITS: 5000 INJECTION INTRAVENOUS; SUBCUTANEOUS at 06:12

## 2022-12-13 RX ADMIN — OXYCODONE HYDROCHLORIDE AND ACETAMINOPHEN 1 TABLET: 5; 325 TABLET ORAL at 04:23

## 2022-12-13 NOTE — PROGRESS NOTES
Reviewed Doppler ultrasound  Vertebral arteries comment it is antegrade and subclavian's patent and multiphasic  No evidence of stroke  Could be postural hypotension for her syncope  If considering any vertebral interventions would have to be seen by Dr. Ragini Bethea at [place] for neuro intervention  Discussed with primary treatment team  Continue statin, antiplatelet, and blood pressure control

## 2022-12-13 NOTE — DISCHARGE INSTRUCTIONS
DISCHARGE SUMMARY from Nurse    PATIENT INSTRUCTIONS:    After general anesthesia or intravenous sedation, for 24 hours or while taking prescription Narcotics:  Limit your activities  Do not drive and operate hazardous machinery  Do not make important personal or business decisions  Do  not drink alcoholic beverages  If you have not urinated within 8 hours after discharge, please contact your surgeon on call. Report the following to your surgeon:  Excessive pain, swelling, redness or odor of or around the surgical area  Temperature over 100.5  Nausea and vomiting lasting longer than 4 hours or if unable to take medications  Any signs of decreased circulation or nerve impairment to extremity: change in color, persistent  numbness, tingling, coldness or increase pain  Any questions    What to do at Home:  Recommended activity: Activity as tolerated, please use fall precautions at all time    If you experience any of the following symptoms chest pain or difficulty breathing or any abnormal symptoms please call and inform your primary care doctor or call 911, please follow up with all appointments as ordered. *  Please give a list of your current medications to your Primary Care Provider. *  Please update this list whenever your medications are discontinued, doses are      changed, or new medications (including over-the-counter products) are added. *  Please carry medication information at all times in case of emergency situations. These are general instructions for a healthy lifestyle:    No smoking/ No tobacco products/ Avoid exposure to second hand smoke  Surgeon General's Warning:  Quitting smoking now greatly reduces serious risk to your health.     Obesity, smoking, and sedentary lifestyle greatly increases your risk for illness    A healthy diet, regular physical exercise & weight monitoring are important for maintaining a healthy lifestyle    You may be retaining fluid if you have a history of heart failure or if you experience any of the following symptoms:  Weight gain of 3 pounds or more overnight or 5 pounds in a week, increased swelling in our hands or feet or shortness of breath while lying flat in bed. Please call your doctor as soon as you notice any of these symptoms; do not wait until your next office visit. The discharge information has been reviewed with the patient. The patient verbalized understanding. Discharge medications reviewed with the patient and appropriate educational materials and side effects teaching were provided.   ___________________________________________________________________________________________________________________________________

## 2022-12-13 NOTE — PROGRESS NOTES
RENAL DAILY PROGRESS NOTE            74y F with PMH ESRD, heart failure, admitted for dizziness, following for ESRD  Subjective:       Complaint:     Overnight events noted  Examine during dialysis. IMPRESSION:   ESRD on HD TTS  Access; tunnel HD catheter. Dizziness,lower ext weakness r/o PCA stroke  Vertebral artery stenosis on CTA neck  Cardiomyopathy, CAD  Secondary hyperparathyroidism  ACD   PLAN:   HD with 2K bath, UF about 2L as tolerated  Plan for heparin 500U/hr during dialysis. At 10:55 AM on 12/13/2022, I saw and examined patient during hemodialysis treatment. The patient was receiving hemodialysis for treatment of  renal failure. I have also reviewed vital signs, intake and output, lab results and recent events, and agreed with today's dialysis order.     Current Facility-Administered Medications   Medication Dose Route Frequency    iron sucrose (VENOFER) injection 100 mg  100 mg IntraVENous Q TUE, THU & SAT    albuterol-ipratropium (DUO-NEB) 2.5 MG-0.5 MG/3 ML  3 mL Nebulization Q6H PRN    gabapentin (NEURONTIN) capsule 400 mg  400 mg Oral DAILY    metoprolol tartrate (LOPRESSOR) tablet 25 mg  25 mg Oral BID    hydrALAZINE (APRESOLINE) tablet 100 mg  100 mg Oral BID    sodium chloride (NS) flush 5-40 mL  5-40 mL IntraVENous Q8H    sodium chloride (NS) flush 5-40 mL  5-40 mL IntraVENous PRN    acetaminophen (TYLENOL) tablet 650 mg  650 mg Oral Q6H PRN    Or    acetaminophen (TYLENOL) suppository 650 mg  650 mg Rectal Q6H PRN    polyethylene glycol (MIRALAX) packet 17 g  17 g Oral DAILY PRN    ondansetron (ZOFRAN ODT) tablet 4 mg  4 mg Oral Q8H PRN    Or    ondansetron (ZOFRAN) injection 4 mg  4 mg IntraVENous Q6H PRN    heparin (porcine) injection 5,000 Units  5,000 Units SubCUTAneous Q8H    glucose chewable tablet 16 g  4 Tablet Oral PRN    glucagon (GLUCAGEN) injection 1 mg  1 mg IntraMUSCular PRN    dextrose 10% infusion 0-250 mL  0-250 mL IntraVENous PRN    insulin lispro (HUMALOG) injection   SubCUTAneous AC&HS    nystatin (MYCOSTATIN) 100,000 unit/gram powder   Topical BID    oxyCODONE-acetaminophen (PERCOCET) 5-325 mg per tablet 1 Tablet  1 Tablet Oral Q6H PRN    melatonin tablet 5 mg  5 mg Oral QHS       Review of Symptoms: comprehensive ROS negative except above. Objective:   Patient Vitals for the past 24 hrs:   Temp Pulse Resp BP SpO2   12/13/22 1045 -- 86 18 137/73 --   12/13/22 1030 -- 87 18 139/79 --   12/13/22 1015 -- 84 18 133/71 --   12/13/22 1000 -- 84 18 134/86 --   12/13/22 0945 -- 80 18 (!) 141/72 --   12/13/22 0930 -- 85 18 (!) 147/78 --   12/13/22 0915 -- 87 18 139/84 --   12/13/22 0901 -- 94 18 (!) 146/84 --   12/13/22 0858 97 °F (36.1 °C) 94 18 (!) 152/77 --   12/13/22 0800 98.5 °F (36.9 °C) 98 18 116/73 95 %   12/13/22 0400 98.2 °F (36.8 °C) 82 19 111/68 94 %   12/13/22 0000 97.3 °F (36.3 °C) 87 18 98/64 95 %   12/12/22 2050 -- -- -- -- 96 %   12/12/22 2000 98.4 °F (36.9 °C) 86 19 105/65 92 %   12/12/22 1617 98.2 °F (36.8 °C) 86 18 114/68 96 %   12/12/22 1308 97.9 °F (36.6 °C) 88 18 114/66 97 %        Weight change:      No intake/output data recorded.   No intake or output data in the 24 hours ending 12/13/22 1049    Physical Exam:   General: comfortable, no acute distress   HEENT sclera anicteric, supple neck, no thyromegaly  CVS: S1S2 heard,  no rub  RS: + air entry b/l,   Abd: Soft, Non tender, Not distended, Positive bowel sounds, no organomegaly, no CVA / supra pubic tenderness  Neuro: non focal, awake, alert , CN II-XII are grossly intact  Extrm:no edema, no cyanosis, clubbing   Skin: no visible  Rash  Musculoskeletal: No gross joints or bone deformities         Data Review:     LABS:   Hematology:   Recent Labs     12/13/22 0146 12/12/22 0435 12/11/22  0246   WBC 5.6 5.9 5.0   HGB 9.7* 10.0* 10.3*   HCT 32.4* 35.0 33.8*     Chemistry:   Recent Labs     12/13/22 0146 12/1950 12/11/22  0246   BUN 43* 41* 24*   CREA 2.92* 3.09* 2.91*   CA 9.8 9.8 9.5   K 4.4 4.4 4.1    136 138    102 103   CO2 26 26 26   * 170* 153*            Procedures/imaging: see electronic medical records for all procedures, Xrays and details which were not copied into this note but were reviewed prior to creation of Plan          Assessment & Plan:     See above        Jen Ramirez MD  12/13/2022

## 2022-12-13 NOTE — DISCHARGE SUMMARY
Hospitalist Discharge Summary    Patient: Preeti Armas MRN: [id number]  CSN: [id number]    YOB: 1950  Age: 81 y.o. Sex: female    DOA: 12/8/2022 LOS:  LOS: 5 days   Discharge Date:     Admission Diagnoses: CVA (cerebral vascular accident) ([facility] [address] [I63.9]  ESRD (end stage renal disease) on dialysis ([facility] [address] [N18.6, Z99.2]  TIA (transient ischemic attack) [G45.9]    Discharge Diagnoses:    Syncope  ESRD on HD  History of hypertension  History of type 2 diabetes  Morbid obesity  History of coronary artery disease    Discharge Condition: Fair    Discharge To: Home    CODE STATUS: Full Code      PHYSICAL EXAM  Visit Vitals  BP (!) 143/66   Pulse 99   Temp 97.1 °F (36.2 °C) (Temporal)   Resp 18   Ht 5' 4\" (1.626 m)   Wt 97.8 kg (215 lb 8 oz)   SpO2 95%   BMI 36.99 kg/m²       General: Alert, cooperative, no acute distress    Lungs:  CTA Bilaterally. No Wheezing/Rhonchi/Rales. Heart:  Regular rate and Rhythm. Abdomen: Soft, Non distended, Non tender. + Bowel sounds. Extremities: No edema/ cyanosis/ clubbing  Neurologic:  AA oriented X 3. Moves all extremities. Preeti Armas is a 81 y.o.  female with hx of ESRD with newly started hemodialysis approximately 10 weeks ago, chronic diastolic congestive heart failure, moderate COPD, obesity, type 2 diabetes mellitus. Patient presented from [facility] emergency department earlier this afternoon. Patient stated that she has been having dizziness and intermittent weakness for the past several weeks. She states she will get dizzy upon standing and her knees will give out. Recently her knees gave out and she had to sit on the floor for several hours before she was able to get herself up. Weakness is bilateral and involves upper and lower extremities. Patient's initial work-up in [facility] ED included CT which was negative for acute stroke.   CTA head and neck with severe bilateral vertebral artery stenosis, to 30% bilateral cervical carotid artery stenosis. Hospital Course:     Patient admitted to the hospital secondary to syncope. CTA head and neck showed severe bilateral vertebral artery stenosis. Patient has a known history of peripheral vascular disease with ESRD, nephrology and vascular surgery consulted. Patient underwent dialysis per schedule. Vascular surgery recommended carotid ultrasound. Carotid ultrasound reviewed and showed left internal carotid artery moderate stenosis, right internal carotid artery mild stenosis, bilateral vertebral arteries were antegrade, bilateral subclavian arteries were multiphasic. Vascular surgery recommended no intervention at this time, continue current treatment and follow-up with Dr. Licha Brunner at [facility].  Patient had an uneventful hospital course. Patient is currently being discharged home and is advised to closely follow-up with her PCP in 1 to 2 weeks    Follow up Care: With PCP in 2 weeks    Consults: Vascular Surgery and nephrology    Significant Diagnostic Studies:     Imaging:  XR Results (most recent):  Results from Hospital Encounter encounter on 12/08/22    XR CHEST PA LAT    Narrative  EXAM: XR CHEST PA LAT    CLINICAL INDICATION/HISTORY: 81 years Female. weakness. Additional History: None    TECHNIQUE: Frontal and lateral views of the chest    COMPARISON: 10/8/2020    FINDINGS:    Chronic silhouette is mildly enlarged, similar in size to prior. Right sided  dialysis catheter with tip at the cavoatrial junction level. Mild diffuse  prominence of interstitial markings, similar compared to prior. The cardiac silhouette appears within normal limits. Aortic calcifications  noted. Pulmonary vasculature appears within normal limits. No confluent airspace  opacity is appreciated. No evidence of pleural effusion or pneumothorax. No acute osseous abnormality appreciated.     Impression  Similar mild diffuse prominence of interstitial markings which may reflect  interstitial edema or chronic lung disease. CT Results (most recent):  Results from Hospital Encounter encounter on 12/08/22    CTA HEAD NECK W CONT    Narrative  NECK:  1. Bilateral ICA origin atherosclerotic disease with less than 50% stenosis. 2.  Severe right vertebral artery origin stenosis. 3.  Portions of the left vertebral artery appears string like with poorly  visualized V1 segment, likely long segment severe stenotic; multifocal stenosis  of the V2 segment. EXAM: CTA HEAD NECK W CONT    CLINICAL INDICATIONS: CVA? ?- dialysis on friday  Body pain, lower extremity weakness, dizziness with standing    TECHNIQUE: Helical CT scan of the brain and neck were performed during rapid IV  bolus contrast administration. These data were reconstructed at .625 mm  intervals for vascular analysis. The data was also reviewed at 2.5 mm intervals  for accompanying soft tissue analysis. 3D post processed images, including  surface shaded displays, were produced for this exam on independent console,  permanently archived and interpreted. All CT scans at this facility are performed using dose optimization technique as  appropriate to a performed exam, to include automated exposure control,  adjustment of the MA and/or kV according to patient size (including appropriate  matching for site-specific examinations) or use of  iterative reconstruction  technique. CONTRAST: 80 cc Isovue 370    COMPARISON: Concurrent CT head    CTA NECK VASCULAR ANALYSIS:    Aortic arch: Left sided with typical configuration. Bilateral subclavian artery  are patent with mild right stenosis. Extensive left subclavian artery is  incompletely visualized secondary to dense contrast in the subclavian vein. Right carotid:  -CCA: Patent  -ECA: Origin is patent  -ICA: Atherosclerotic plaque at the origin resulting in approximately 20%  stenosis of proximal ICA by NASCET criteria.     Left carotid:  -CCA: Patent  -ECA: Origin is patent  -ICA: Atherosclerotic plaque at the origin results in approximately 30% stenosis  of proximal ICA by NASCET criteria. Right vertebral: Severe stenosis at the origin, moderate stenosis of the V1  segment. Mild atherosclerotic calcification of the proximal V2 segment otherwise  patent to the skull base. Left vertebral: Severe stenosis or segmental occlusion of the proximal V1  segment. String-like appearance of portions of the V2 segment with multifocal  moderate to severe stenoses. CTA HEAD VASCULAR ANALYSIS:    Anterior circulation:  -ICA: Atherosclerotic calcification of the bilateral cavernous, paraclinoid and  supraclinoid ICA with mild stenosis. -JUAN RAMON: Likely anatomic variant right AICA supplying the frontal polar and  anterior frontal lobe. Dominant left JUAN RAMON appears patent. -MCA: Patent bilaterally. Mild left MCA, M2 stenosis proximally  -AComm: Anatomic variant noted above, not visualized. -PComm: Patent on the right is dominant supply the posterior vertebral artery. Hypoplastic, diminutive on the left. Posterior circulation:  -Vertebral: Diminutive but patent on the left with V4 stenosis. -Basilar: Diminutive but patent. -Superior cerebellar: Bilateral origins are patent, diminutive left SCA. -PCA: Dominant supply of the right PCA via the posterior communicating artery. Mild stenosis of the right P2 segment. Moderate stenosis of the left proximal P2  segment. .    Major dural venous sinuses: Unremarkable    CTA SOFT TISSUE ANALYSIS:  Lungs: Mild increase in interstitial markings bilateral lung apices. Upper chest: Right chest tunneled dialysis catheter into the right IJ. Neck: Unremarkable  Lymph nodes: No significant lymphadenopathy. Orbits: Bilateral ocular lens replacements. Paranasal sinuses: Clear  Brain: No hemorrhage or mass effect. Bones: Multilevel degenerative disc disease, facet arthropathy.  Large anterior  osteophytosis from C1-C7    Impression  CTA HEAD:  1. No large vessel occlusion identified. 2.  Anatomic variant JUAN RAMON, nondominant left vertebral artery as detailed above. CTA NECK:  1. Patent cervical carotid arteries with 20-30% origin stenosis of the ICA. 2.  Severe segmental stenosis of the left vertebral artery areas of string-like  appearance. 3.  Severe origin stenosis right vertebral artery. Preliminary report provided at 1630.      06/25/22    ECHO ADULT COMPLETE 06/29/2022 6/29/2022    Interpretation Summary    Left Ventricle: Mildly reduced left ventricular systolic function with a visually estimated EF of 50 - 55%. Left ventricle is mildly dilated. Increased wall thickness. Findings consistent with mild concentric hypertrophy. See diagram for wall motion findings. Diastolic dysfunction present with normal LV EF. Mitral Valve: Mildly thickened leaflet. Mild regurgitation with a centrally directed jet. Signed by: Rodrigo Villasenor MD on 6/29/2022 12:12 PM       MRI Results (most recent):  Results from Hospital Encounter encounter on 12/08/22    MRI BRAIN WO CONT    Narrative  MRI BRAIN WITHOUT CONTRAST    PROVIDED REASON FOR EXAM: r/o CVA. Additional History: Weakness in legs, dizziness  Comparison Studies: Head CT, CTA head and neck 12/8/2022. Head CT 9/19/2022    Imaging Technique:  Sequences:  Sagittal,  Coronal and axial T1 weighted, Diffusion Weighted  (DWI), Axial T2 weighted, Axial T2 FLAIR, and SWI/GRE MRI images of the brain. Contrast Material:  NONE    Limiting Factors/Major Artifacts: This is a incomplete examination, the entire  routine brain MRI protocol could not be completed due to patient's condition. The axial SWI/GRE, axial T1 noncontrast and coronal T1 noncontrast images were  not completed. FINDINGS:    Brain Parenchyma: Negative for acute infarct.    Mostly confluent T2 FLAIR  hyperintense signal in the periventricular white matter with some subcortical  white matter extension consistent with advanced chronic microvascular ischemic  changes. There are small chronic lacunar infarcts in the bilateral thalami,  right posterior lentiform nucleus there is a small chronic infarct at the left  posterior frontal vertex in the region of the hand motor cortex. No visible  masses or midline shift. CSF Spaces:  Global cerebral and cerebellar volume loss, advanced for patient  age. Enlargement of the lateral ventricles and third ventricle are compatible  with the global volume loss, primarily central white matter atrophy. Vascular System:  Grossly patent flow in basilar and internal cerebral arteries. No findings of dural sinus thrombosis. Hemorrhage:  No findings of acute hemorrhage  Other Structures:    Calvarium: No suspicious marrow signal.    Sella: Pituitary is not enlarged. Visualized Upper Cervical Spine: Normal.  Orbits: No acute findings. Previous bilateral lens surgery  Paranasal Sinuses: No significant paranasal sinus disease. Mastoid Air Cells:  Clear. Impression  No acute intracranial abnormality. No mass, hemorrhage, or acute infarct. Advanced chronic microvascular ischemic changes in the cerebral white matter. Chronic deep brain lacunar infarcts in the bilateral thalami and right basal  ganglia. Small chronic cortical infarct at the left posterior frontal vertex. Procedures:     None       Current Discharge Medication List        CONTINUE these medications which have NOT CHANGED    Details   hydrALAZINE (APRESOLINE) 100 mg tablet Take 100 mg by mouth two (2) times a day. tiZANidine (ZANAFLEX) 4 mg tablet Take 4 mg by mouth two (2) times daily as needed. metoprolol tartrate (LOPRESSOR) 25 mg tablet Take 1 Tablet by mouth two (2) times a day.   Qty: 60 Tablet, Refills: 0      insulin detemir U-100 (Levemir U-100 Insulin) 100 unit/mL injection 15 units daily for diabetes once daily with lunch  Qty: 2 mL, Refills: 0    Associated Diagnoses: Diabetes mellitus type 2, insulin dependent (HCC)      gabapentin (NEURONTIN) 400 mg capsule Take 400 mg by mouth in the morning. albuterol-ipratropium (DUO-NEB) 2.5 mg-0.5 mg/3 ml nebu 3 mL by Nebulization route every six (6) hours as needed for Wheezing. Qty: 30 Nebule, Refills: 0      aspirin 81 mg chewable tablet Take 2 Tabs by mouth daily. Qty: 60 Tab, Refills: 0    Associated Diagnoses: Coronary artery disease involving native coronary artery of native heart without angina pectoris      umeclidinium-vilanterol (ANORO ELLIPTA) 62.5-25 mcg/actuation inhaler Take 1 Puff by inhalation daily. For COPD  Qty: 3 Inhaler, Refills: 3    Comments: 9/22/15 Dr Glendy Young prescribed. Will order via [facility] if available  Associated Diagnoses: Chronic obstructive pulmonary disease, unspecified COPD type ([address]               Activity: Activity as tolerated    Diet: Cardiac Diet    Wound Care: None needed      Luis Head MD  12/13/2022, 3:47 PM    Total time spent 32 mins  Disclaimer: Sections of this note are dictated using utilizing voice recognition software. Minor typographical errors may be present. If questions arise, please do not hesitate to contact me or call our department.

## 2022-12-13 NOTE — PROGRESS NOTES
New OT order received and chart reviewed. Patient was evaluated and discharged 12/10/2022 d/t pt being at baseline with ADLs as Mod I, defer functional mobility training to PT. Formal OT evaluation not indicated. OT to sign off.

## 2022-12-13 NOTE — DIALYSIS
EMILI    CATHETER ACCESS: []N/A   [x]Right   []Left   [x]IJ     []Fem   [x]chest wall   [] First use X-ray verified     [x]Tunnel                [] Non Tunneled   [x]No S/S infection  []Redness  []Drainage []Cultured []Swelling []Pain   [x]Medical Aseptic Prep Utilized   []Dressing Changed  [] Biopatch  Date:       []Clotted   [x]Patent   Flows: [x]Good  []Poor  []Reversed   If access problem,  notified: []Yes    [x]N/A  Date:                       GENERAL ASSESSMENT:      LUNGS:  Rate  SaO2% [] N/A    [x] Clear  [] Coarse  [] Crackles  [] Wheezing        [] Diminished     Location : []RLL   []LLL    []RUL  []ISAURO     Cough: []Productive  []Dry  [x]N/A   Respirations:  [x]Easy  []Labored     Therapy:   [x]RA  []NC  l/min    Mask: []NRB []Venti       O2%                  []Ventilator  []Intubated  [] Trach  [] BiPaP     CARDIAC: [x]Regular      [] Irregular   [] Pericardial Rub  [] JVD        []  Monitored  [] Bedside  [] Remotely monitored [] N/A  Rhythm:      EDEMA: [] None  [x]Generalized  [] Pitting [] 1    [] 2    [] 3    [] 4                 [] Facial  [] Pedal  []  UE  [] LE     SKIN:   [x] Warm  [] Hot     [] Cold   [x] Dry     [] Pale   [] Diaphoretic                  [] Flushed  [] Jaundiced  [] Cyanotic  [] Rash  [] Weeping     LOC:    [x] Alert      [x]Oriented:    [x] Person     [x] Place  [x]Time               [] Confused  [] Lethargic  [] Medicated  [] Non-responsive     GI / ABDOMEN:  [] Flat    [] Distended    [x] Soft    [] Firm   []  Obese                             [] Diarrhea  [x] Bowel Sounds  [] Nausea  [] Vomiting       / URINE ASSESSMENT:[] Voiding   [x] Oliguria  [] Anuria   []  Frye     [] Incontinent    []  Incontinent Brief      []  Bathroom Privileges       PAIN: [x] 0 []1  []2   []3   []4   []5   []6   []7   []8   []9   []10              Scale 0-10  Action/Follow Up:      MOBILITY:  [] Amb    [] Amb/Assist    [x] Bed    [] Wheelchair  [] Stretcher      All Vitals and Treatment Details on Attached Flowsheet

## 2022-12-13 NOTE — PROGRESS NOTES
Problem: Diabetes Self-Management  Goal: *Disease process and treatment process  Description: Define diabetes and identify own type of diabetes; list 3 options for treating diabetes. Outcome: Progressing Towards Goal     Problem: Patient Education: Go to Patient Education Activity  Goal: Patient/Family Education  Outcome: Progressing Towards Goal     Problem: Falls - Risk of  Goal: *Absence of Falls  Description: Document Darlen Monroe Fall Risk and appropriate interventions in the flowsheet. Outcome: Progressing Towards Goal  Note: Fall Risk Interventions:  Mobility Interventions: Bed/chair exit alarm         Medication Interventions: Bed/chair exit alarm    Elimination Interventions: Call light in reach, Bed/chair exit alarm              Problem: Chronic Renal Failure  Goal: *Fluid and electrolytes stabilized  Outcome: Progressing Towards Goal     Problem: Pressure Injury - Risk of  Goal: *Prevention of pressure injury  Description: Document Lvadislav Scale and appropriate interventions in the flowsheet.   Outcome: Progressing Towards Goal  Note: Pressure Injury Interventions:       Moisture Interventions: Absorbent underpads    Activity Interventions: PT/OT evaluation    Mobility Interventions: PT/OT evaluation    Nutrition Interventions: Document food/fluid/supplement intake    Friction and Shear Interventions: HOB 30 degrees or less                Problem: Patient Education: Go to Patient Education Activity  Goal: Patient/Family Education  Outcome: Progressing Towards Goal

## 2022-12-13 NOTE — PROGRESS NOTES
Pt not seen for skilled PT due to:  [ ]  Nausea/vomiting  [ ]  Eating  [ ]  Pain  [ ]  Pt lethargic  [ x]  Off Unit for HD  Will f/u later as schedule allows. Thank you.   Alexa Delaney PT, DPT

## 2022-12-13 NOTE — DIALYSIS
ACUTE HEMODIALYSIS FLOW SHEET    HEMODIALYSIS ORDERS: Physician: Dr. Byrne Click: Primo   Duration: 3 hr   BFR: 350   DFR: 600   Dialysate:  Temp 36-37*C   K+  2    Ca+ 2.5   Na 138   Bicarb 30   Wt Readings from Last 1 Encounters:   12/12/22 97.8 kg (215 lb 8 oz)    Patient Chart [x]   Unable to Obtain []  Dry weight/UF Goal: 2000 ml    Heparin []  Bolus    Units    [] Hourly    Units    [x]None       Pre BP: 152/77  Pulse: 94  Respirations: 18 Temp: 97.0 temporal  [] Oral  [] Ax  [] Esoph   Labs: []  Pre  []  Post:   [x] N/A   Additional Orders (medications, blood products, hypotension management): [] Yes   [x] No     [x]  [facility] Consent Verified     CATHETER ACCESS:     [x]Right   []Left   []IJ   []Fem  [x]Chest wall  []TransHepatic   [] First use X-ray verified     [x]Tunnel    [] Non Tunneled   [x]No S/S infection  []Redness  []Drainage []Cultured []Swelling []Pain   [x]Medical Aseptic Prep Utilized   []Dressing Changed  [] Biopatch  Date:    []Clotted   [x]Patent   Flows: [x]Good  []Poor  []Reversed   If access problem,  notified: []Yes    [x]N/A        GRAFT/FISTULA ACCESS:   [x]N/A                        Hospital: [facility]          Room # 700/74    [x] Routine         [] 1st Time Acute/Chronic   [] Urgent      [] Stat            [x] Acute Room   []  Bedside    [] ICU/CCU     [] ER     Isolation Precautions:  [x] Dialysis    There are currently no Active Isolations     ALLERGIES:     No Known Allergies     Code Status:  Full Code     Hepatitis Status      Lab Results   Component Value Date/Time    Hepatitis A, IgM NEGATIVE 03/23/2015 09:04 AM    Hepatitis B surface Ag <0.10 12/09/2022 07:03 PM    Hepatitis B surface Ab >1,000.00 12/09/2022 07:03 PM    Hepatitis B core, IgM NEGATIVE 03/23/2015 09:04 AM    Hepatitis C virus Ab 0.09 03/23/2015 09:04 AM        Current Labs:      Lab Results   Component Value Date/Time    WBC 5.6 12/13/2022 01:46 AM    HGB 9.7 (L) 12/13/2022 01:46 AM    HCT 32.4 (L) 12/13/2022 01:46 AM    PLATELET 294 66/59/7706 01:46 AM    MCV 94.5 12/13/2022 01:46 AM     Lab Results   Component Value Date/Time    Sodium 138 12/13/2022 01:46 AM    Potassium 4.4 12/13/2022 01:46 AM    Chloride 103 12/13/2022 01:46 AM    CO2 26 12/13/2022 01:46 AM    Anion gap 9 12/13/2022 01:46 AM    Glucose 154 (H) 12/13/2022 01:46 AM    BUN 43 (H) 12/13/2022 01:46 AM    Creatinine 2.92 (H) 12/13/2022 01:46 AM    BUN/Creatinine ratio 15 12/13/2022 01:46 AM    GFR est AA 54 (L) 09/22/2022 04:20 AM    GFR est non-AA 44 (L) 09/22/2022 04:20 AM    Calcium 9.8 12/13/2022 01:46 AM          DIET:  DIET ADULT     PRIMARY NURSE REPORT:   Pre Dialysis: Billy Miranda RN    Time: 2404      EDUCATION:    [x] Patient           Knowledge Basis: []None []Minimal [x] Substantial [] Unknown  Barriers to learning  [x]None  [] Intubated/Trached/Ventilated  [] Sedated/Paralyzed   [x] Access Care     [] S&S of infection  [] Fluid Management  [] K+   [x] Procedural    [] Medications   [] Tx Options   [] Transplant   [] Diet      Teaching Tools:  [x] Explain  [] Demo  [] Handouts [] Video  Patient response: [x] Verbalized understanding   [] Requires follow up        [x] Time Out/Safety Check    [x] Extracorporeal Circuit Tested for integrity       RO/HEMODIALYSIS MACHINE SAFETY CHECKS - Before each treatment:        [address] [place] [address]                                     [x] Unit Machine # 8 with centralized RO                                  [] Portable Machine #1/RO serial # [id number]                                  [] Portable Machine #2/RO serial # [id number]                                  [] Portable Machine #4/RO serial # [id number]                                  [] Portable Machine #10/RO serial # [id number]                                                                                                       Alarm Test:  Pass time [id number]            [x] RO/Machine Log Complete    Machine Temp    36-37*C Dialysate: pH  7.4    Conductivity: Meter 14.0    HD Machine  8     TCD: 13.9  Dialyzer Lot # [id number]     Blood Tubing Lot # [id number]     Saline Lot # [id number]     CHLORINE TESTING-Before each treatment and every 4 hours    Total Chlorine: [x] less than 0.1 ppm  Initial Time Check: 0800       4 Hr/2nd Check Time: 1200   (if greater than 0.1 ppm from Primary then every 30 minutes from Secondary)     TREATMENT INITIATION - with Dialysis Precautions:   [x] All Connections Secured              [x] Saline Line Double Clamped   [x] Venous Parameters Set               [x] Arterial Parameters Set    [x] Prime Given 250ml NSS              [x]Air Foam Detector Engaged        Treatment Initiation Note:  SEE HD RN NOTE    During Treatment Notes:  3263  Face & Vascular access visible with art and adan line connections intact. Pt tolerating dialysis. 0901 Face & Vascular access visible with art and adan line connections intact. Pt tolerating dialysis. 0915  Face & Vascular access visible with art and adan line connections intact. Pt tolerating dialysis. 0930  Face & Vascular access visible with art and adan line connections intact. Pt tolerating dialysis. 0945  Face & Vascular access visible with art and adan line connections intact. Pt tolerating dialysis. 1000  Face & Vascular access visible with art and adan line connections intact. Pt tolerating dialysis. 1015  Face & Vascular access visible with art and adan line connections intact. Pt tolerating dialysis. 1030  Face & Vascular access visible with art and adan line connections intact. Pt tolerating dialysis. 1045  Face & Vascular access visible with art and adan line connections intact. Pt tolerating dialysis. 1100  Face & Vascular access visible with art and adan line connections intact. Pt tolerating dialysis. 1115  Face & Vascular access visible with art and adan line connections intact. Pt tolerating dialysis.   1130  Face & Vascular access visible with art and adan line connections intact. Pt tolerating dialysis. 1145  Face & Vascular access visible with art and adan line connections intact. Pt tolerating dialysis. 1200  Dialysis treatment complete.            Post Assessment  Dialyzer Cleared:   [x] Good  [] Fair  [] Poor  Blood processed:  53.0 L  UF Removed:  2000 Ml    Post BP: 143/66  Pulse: 99  Respirations: 18   Temp: 97.1 temporal  [] Oral  [] Ax  [] Esophageal   Lungs: [] Clear                [x] No change from initial assessment   Post Tx Vascular Access: [x] N/A       Cardiac:  [x] Regular   [] Irregular   Rhythm:  [] Monitored   [x] Not Monitored    CVC Catheter:   Locking solution: Heparin 1:1000 U  Arterial port 1.9 ml   Venous port 1.9 ml   Edema:  [x] None  [] Generalized                     Skin:[] Warm  [x] Dry [] Diaphoretic               [] Flushed  [] Pale [] Cyanotic Pain:  [x]0  []1-2  []3-4  []5-6   []7-8  []9-10         Post Treatment Note:   SEE HD RN NOTE     POST TREATMENT PRIMARY NURSE HANDOFF REPORT:   Post Dialysis: Heather Kulkarni, RN        Time:  0917       Abbreviations: AVG-arterial venous graft, AVF-arterial venous fistula, IJ-Internal Jugular, Subcl-Subclavian, Fem-Femoral, Tx-treatment, AP/HR-apical heart rate, VSS- Vital Signs Stable, CVC- Central Venous Catheter, DFR-dialysate flow rate, BFR-blood flow rate, AP-arterial pressure, -venous pressure, UF-ultrafiltrate, TMP-transmembrane pressure, Adan-Venous, Art-Arterial, RO-Reverse Osmosis

## 2022-12-14 NOTE — ED NOTES
Anticoagulation Progress Note    Indication: Atrial fibrillation  Goal INR: 2-3  INR Result: 3.3    78 year old male returns to the [facility] for a follow-up visit after 3 weeks.      Patient denied anything that increases INR.    Assessment  (-) bleeding, bruising or thromboembolic complications  (-) missed/extra warfarin doses  (-) medication changes  (-) dietary changes  (-) alcohol  (-) smoking  (-) activity level changes  (-) hospital visits, ER visits, interruptions in therapy  (-) illness/infection, injuries, or falls    Plan   -Pt's INR=3.3 today, which is above the desired therapeutic range of 2-3.  -Pt's INR has increased from 2.7 at the last visit.    -Pt has been taking warfarin 37.5 mg weekly.  -Plan to have the pt hold the warfarin dose tonight , then continue regular warfarin regimen. (7.5 mg Wed, 5 mg ROW)  -Pt to return to clinic in 4 weeks (1/11/23 at 11:45 AM per patient request).      STAN Rahman Candidate 2023   Greeted pt in hospital bed pending admission. A/o x 4. . cooperative. Denies sob or cp. Lungs clear to auscultation . She reports copd history but doesn't feel she needs a neb treatment at this time. Side rails up x 3. Can make needs known . Moving alllimbs against gravity. Call bell in reach.

## 2022-12-21 ENCOUNTER — OFFICE VISIT (OUTPATIENT)
Dept: VASCULAR SURGERY | Age: 72
End: 2022-12-21

## 2022-12-21 VITALS
HEIGHT: 64 IN | SYSTOLIC BLOOD PRESSURE: 118 MMHG | DIASTOLIC BLOOD PRESSURE: 70 MMHG | BODY MASS INDEX: 36.7 KG/M2 | WEIGHT: 215 LBS | OXYGEN SATURATION: 98 % | HEART RATE: 84 BPM

## 2022-12-21 DIAGNOSIS — N18.6 ESRD (END STAGE RENAL DISEASE) ON DIALYSIS (HCC): Primary | ICD-10-CM

## 2022-12-21 DIAGNOSIS — Z99.2 ESRD (END STAGE RENAL DISEASE) ON DIALYSIS (HCC): Primary | ICD-10-CM

## 2022-12-21 DIAGNOSIS — I65.22 ASYMPTOMATIC STENOSIS OF LEFT CAROTID ARTERY: ICD-10-CM

## 2022-12-21 PROCEDURE — 3078F DIAST BP <80 MM HG: CPT | Performed by: SURGERY

## 2022-12-21 PROCEDURE — 1123F ACP DISCUSS/DSCN MKR DOCD: CPT | Performed by: SURGERY

## 2022-12-21 PROCEDURE — 3074F SYST BP LT 130 MM HG: CPT | Performed by: SURGERY

## 2022-12-21 PROCEDURE — 99203 OFFICE O/P NEW LOW 30 MIN: CPT | Performed by: SURGERY

## 2022-12-21 NOTE — PROGRESS NOTES
Jorge Yarbrough (: 1950) is a 81 y.o. female here for evaluation of the following chief complaint(s):  End Stage Renal Disease (Referred by [facility])       ASSESSMENT/PLAN:  Below is the assessment and plan developed based on review of pertinent history, physical exam, labs, studies, and medications. 1. ESRD (end stage renal disease) on dialysis ([address]  2. Asymptomatic stenosis of left carotid artery      Pt in need of permanent HD access. Discussed creation of AVF in detail and the risks involved such as bleeding, nerve damage and steal syndrome. She has adequate right cephalic vein but she is right handed. Her left arm cephalic vein is narrow at the antecubitum and she has chronic numbness and pain in her left hand secondary to an old injury. We will plan to proceed with a right brachicephalic avf. Pt verbalized understanding of the risks and benefits and all questions were answered. She elects to proceed. 2. No history of CVA. Recommend best medical mgt with antiplatelet and statin. She used to take Atorvastatin 80mg daily but stopped since starting dialysis. I recommended she restart this medication and patient has a supply at home and does not need a new medication. Additionally we will begin a yearly duplex surveillance of her extracranial arteries. Patient agrees with this plan. SUBJECTIVE/OBJECTIVE:  HPI  80 yo female with pmhx of ESRD on HD (T,,SAT) with CHF, COPD, CAD with recent hospital admission this month for dizziness. Acute CVA was ruled out but CTA head revealed some veterbral artery disease and mild carotid artery stenosis. She has been on dialysis for 16 weeks via a right IJ TDC. She is right handed. She had her vein mapping this morning. She does experience left hand numbness and pain from an old injury. No issues with right hand.     No Known Allergies    Current Outpatient Medications   Medication Sig    tiZANidine (ZANAFLEX) 4 mg tablet Take 4 mg by mouth two (2) times daily as needed. metoprolol tartrate (LOPRESSOR) 25 mg tablet Take 1 Tablet by mouth two (2) times a day. insulin detemir U-100 (Levemir U-100 Insulin) 100 unit/mL injection 15 units daily for diabetes once daily with lunch    gabapentin (NEURONTIN) 400 mg capsule Take 400 mg by mouth in the morning. albuterol-ipratropium (DUO-NEB) 2.5 mg-0.5 mg/3 ml nebu 3 mL by Nebulization route every six (6) hours as needed for Wheezing. aspirin 81 mg chewable tablet Take 2 Tabs by mouth daily. umeclidinium-vilanterol (ANORO ELLIPTA) 62.5-25 mcg/actuation inhaler Take 1 Puff by inhalation daily. For COPD    hydrALAZINE (APRESOLINE) 100 mg tablet Take 100 mg by mouth two (2) times a day. (Patient not taking: Reported on 12/21/2022)     No current facility-administered medications for this visit. Past Medical History:   Diagnosis Date    Abnormal WBC count 5/17/2016    Anemia     Bilateral shoulder pain 9/27/2016    Chondromalacia of both patellae     COPD (chronic obstructive pulmonary disease) ([facility] 9/2015    mild-mod dz; Dr Faulkner [place]    Diabetes [facility]) 2013    Diabetic eye exam ([facility] 2016    Dilated cardiomyopathy ([facility]     Dyslipidemia     Gout     Heart attack ([facility] 10/18/2019    History of echocardiogram 11/14/2014    Mild LVE. EF 40%. Mild, diffuse hypk. Mild LAE. Mild MR.       Hypercholesteremia 1/08/14    Hypertension 2000    Noncompliance with medications 2/16/2016    Obesity     Orthostatic hypotension 5/17/2016    Osteoarthritis of both knees     Pain management 04/01/2016    Dr. Lindy Owen     Popliteal cyst, bilateral      Stage 3b chronic kidney disease ([facility] 8/8/2022    Vitamin D deficiency 10/16/14       Past Surgical History:   Procedure Laterality Date    HX HEART CATHETERIZATION      HX TUBAL LIGATION      IR INSERT TUNL CVC W/O PORT OVER 5 YR  9/21/2022       Family History   Problem Relation Age of Onset    Diabetes Mother     Hypertension Mother     Hypertension Father     Kidney Disease Maternal Aunt 50        Dialysis       Social History     Tobacco Use   Smoking Status Former    Packs/day: 1.00    Years: 52.00    Pack years: 52.00    Types: Cigarettes    Quit date: 5/20/2019    Years since quitting: 3.5   Smokeless Tobacco Never         Review of Systems  No chest pain, no shortness of breath, positive joint pain, no fever. Physical Exam  General: Well-appearing male in no acute distress   HEENT: EOMI, no scleral icterus is noted. Pulmonary: No increased work or breathing is noted. Abdomen:  nondistended. Extremities: Warm and well perfused bilaterally. Vascular: Bilateral palp radial arteries  Neuro: Cranial nerves II through XII are grossly intact, bilateral hands motor and sensory intact. Integument: No ulcerations are identified visibly          An electronic signature was used to authenticate this note.   -- Boy Clayton MD

## 2022-12-22 ENCOUNTER — ANESTHESIA EVENT (OUTPATIENT)
Dept: CARDIOTHORACIC SURGERY | Age: 72
End: 2022-12-22
Payer: MEDICARE

## 2022-12-22 NOTE — PERIOP NOTES
PRE-SURGICAL INSTRUCTIONS        Patient's Name:  Mohit Panchal      MQCER'T Date:  12/22/2022            Covid Testing Date and Time:    Surgery Date:  12/23/2022                Do NOT eat or drink anything, including candy, gum, or ice chips after midnight on 12/22/2022, unless you have specific instructions from your surgeon or anesthesia provider to do so. You may brush your teeth before coming to the hospital.  No smoking 24 hours prior to the day of surgery. No alcohol 24 hours prior to the day of surgery. No recreational drugs for one week prior to the day of surgery. Leave all valuables, including money/purse, at home. Remove all jewelry, nail polish, acrylic nails, and makeup (including mascara); no lotions powders, deodorant, or perfume/cologne/after shave on the skin. Follow instruction for Hibiclens washes and CHG wipes from surgeon's office. Glasses/contact lenses and dentures may be worn to the hospital.  They will be removed prior to surgery. Call your doctor if symptoms of a cold or illness develop within 24-48 hours prior to your surgery. 11.  If you are having an outpatient procedure, please make arrangements for a responsible ADULT TO [address] [place] [address] and stay with you for 24 hours after your surgery. 12. ONE VISITOR in the hospital at this time for outpatient procedures. Exceptions may be made for surgical admissions, per nursing unit guidelines      Special Instructions:      Bring list of CURRENT medications. Bring inhaler. Bring CPAP machine. Bring any pertinent legal medical records. Take these medications the morning of surgery with a sip of water:  Metoprolol  Follow physician instructions about insulin. Follow physician instructions about stopping anticoagulants. Complete bowel prep per MD instructions. On the day of surgery, come in the main entrance of [facility]. Let the  at the desk know you are there for surgery.   A staff member will come escort you to the surgical area on the second floor. If you have any questions or concerns, please do not hesitate to call:     (Prior to the day of surgery) PAT department:  [phone]   (Day of surgery) Pre-Op department:  [phone]    These surgical instructions were reviewed with patient during the PAT phone call.

## 2022-12-23 ENCOUNTER — ANESTHESIA (OUTPATIENT)
Dept: CARDIOTHORACIC SURGERY | Age: 72
End: 2022-12-23
Payer: MEDICARE

## 2022-12-23 ENCOUNTER — HOSPITAL ENCOUNTER (OUTPATIENT)
Age: 72
Setting detail: OUTPATIENT SURGERY
Discharge: HOME | End: 2022-12-23
Attending: SURGERY | Admitting: SURGERY
Payer: MEDICARE

## 2022-12-23 VITALS
SYSTOLIC BLOOD PRESSURE: 122 MMHG | OXYGEN SATURATION: 96 % | WEIGHT: 213 LBS | RESPIRATION RATE: 20 BRPM | DIASTOLIC BLOOD PRESSURE: 62 MMHG | HEART RATE: 84 BPM | HEIGHT: 64 IN | BODY MASS INDEX: 36.37 KG/M2 | TEMPERATURE: 98.2 F

## 2022-12-23 DIAGNOSIS — N18.6 ESRD (END STAGE RENAL DISEASE) ON DIALYSIS (HCC): Primary | ICD-10-CM

## 2022-12-23 DIAGNOSIS — Z99.2 ESRD (END STAGE RENAL DISEASE) ON DIALYSIS (HCC): Primary | ICD-10-CM

## 2022-12-23 LAB
ABO + RH BLD: NORMAL
ANION GAP SERPL CALC-SCNC: 8 MMOL/L (ref 3–18)
BLOOD GROUP ANTIBODIES SERPL: NORMAL
BUN SERPL-MCNC: 25 MG/DL (ref 7–18)
BUN/CREAT SERPL: 9 (ref 12–20)
CALCIUM SERPL-MCNC: 9.2 MG/DL (ref 8.5–10.1)
CHLORIDE SERPL-SCNC: 104 MMOL/L (ref 100–111)
CO2 SERPL-SCNC: 27 MMOL/L (ref 21–32)
CREAT SERPL-MCNC: 2.76 MG/DL (ref 0.6–1.3)
GLUCOSE BLD STRIP.AUTO-MCNC: 143 MG/DL (ref 70–110)
GLUCOSE BLD STRIP.AUTO-MCNC: 161 MG/DL (ref 70–110)
GLUCOSE SERPL-MCNC: 166 MG/DL (ref 74–99)
POTASSIUM SERPL-SCNC: 4.3 MMOL/L (ref 3.5–5.5)
SODIUM SERPL-SCNC: 139 MMOL/L (ref 136–145)
SPECIMEN EXP DATE BLD: NORMAL

## 2022-12-23 PROCEDURE — 74011000250 HC RX REV CODE- 250: Performed by: ANESTHESIOLOGY

## 2022-12-23 PROCEDURE — 86900 BLOOD TYPING SEROLOGIC ABO: CPT

## 2022-12-23 PROCEDURE — 82962 GLUCOSE BLOOD TEST: CPT

## 2022-12-23 PROCEDURE — 76060000034 HC ANESTHESIA 1.5 TO 2 HR: Performed by: SURGERY

## 2022-12-23 PROCEDURE — 74011250636 HC RX REV CODE- 250/636: Performed by: NURSE ANESTHETIST, CERTIFIED REGISTERED

## 2022-12-23 PROCEDURE — 77030002987 HC SUT PROL J&J -B: Performed by: SURGERY

## 2022-12-23 PROCEDURE — 74011250636 HC RX REV CODE- 250/636: Performed by: ANESTHESIOLOGY

## 2022-12-23 PROCEDURE — 2709999900 HC NON-CHARGEABLE SUPPLY: Performed by: SURGERY

## 2022-12-23 PROCEDURE — 77030002986 HC SUT PROL J&J -A: Performed by: SURGERY

## 2022-12-23 PROCEDURE — 74011250637 HC RX REV CODE- 250/637: Performed by: NURSE ANESTHETIST, CERTIFIED REGISTERED

## 2022-12-23 PROCEDURE — 77030010507 HC ADH SKN DERMBND J&J -B: Performed by: SURGERY

## 2022-12-23 PROCEDURE — 74011636637 HC RX REV CODE- 636/637: Performed by: NURSE ANESTHETIST, CERTIFIED REGISTERED

## 2022-12-23 PROCEDURE — 76210000017 HC OR PH I REC 1.5 TO 2 HR: Performed by: SURGERY

## 2022-12-23 PROCEDURE — 74011000258 HC RX REV CODE- 258: Performed by: ANESTHESIOLOGY

## 2022-12-23 PROCEDURE — 74011250636 HC RX REV CODE- 250/636: Performed by: SURGERY

## 2022-12-23 PROCEDURE — 74011000250 HC RX REV CODE- 250: Performed by: SURGERY

## 2022-12-23 PROCEDURE — 76010000129 HC CV SURG 1.5 TO 2 HR: Performed by: SURGERY

## 2022-12-23 PROCEDURE — 80048 BASIC METABOLIC PNL TOTAL CA: CPT

## 2022-12-23 PROCEDURE — 77030010512 HC APPL CLP LIG J&J -C: Performed by: SURGERY

## 2022-12-23 PROCEDURE — 77030002933 HC SUT MCRYL J&J -A: Performed by: SURGERY

## 2022-12-23 PROCEDURE — 77030016441 HC APPL CLP LIG1 J&J -B: Performed by: SURGERY

## 2022-12-23 PROCEDURE — 76210000021 HC REC RM PH II 0.5 TO 1 HR: Performed by: SURGERY

## 2022-12-23 RX ORDER — SODIUM CHLORIDE 0.9 % (FLUSH) 0.9 %
5-40 SYRINGE (ML) INJECTION AS NEEDED
Status: CANCELLED | OUTPATIENT
Start: 2022-12-23

## 2022-12-23 RX ORDER — OXYCODONE AND ACETAMINOPHEN 5; 325 MG/1; MG/1
1 TABLET ORAL
Qty: 12 TABLET | Refills: 0 | Status: SHIPPED | OUTPATIENT
Start: 2022-12-23 | End: 2022-12-26

## 2022-12-23 RX ORDER — HYDROMORPHONE HYDROCHLORIDE 1 MG/ML
0.5 INJECTION, SOLUTION INTRAMUSCULAR; INTRAVENOUS; SUBCUTANEOUS
Status: CANCELLED | OUTPATIENT
Start: 2022-12-23

## 2022-12-23 RX ORDER — LIDOCAINE HYDROCHLORIDE 20 MG/ML
INJECTION, SOLUTION EPIDURAL; INFILTRATION; INTRACAUDAL; PERINEURAL AS NEEDED
Status: DISCONTINUED | OUTPATIENT
Start: 2022-12-23 | End: 2022-12-23 | Stop reason: HOSPADM

## 2022-12-23 RX ORDER — ONDANSETRON 2 MG/ML
INJECTION INTRAMUSCULAR; INTRAVENOUS AS NEEDED
Status: DISCONTINUED | OUTPATIENT
Start: 2022-12-23 | End: 2022-12-23 | Stop reason: HOSPADM

## 2022-12-23 RX ORDER — INSULIN LISPRO 100 [IU]/ML
INJECTION, SOLUTION INTRAVENOUS; SUBCUTANEOUS ONCE
Status: CANCELLED | OUTPATIENT
Start: 2022-12-23 | End: 2022-12-23

## 2022-12-23 RX ORDER — HEPARIN SODIUM 200 [USP'U]/100ML
INJECTION, SOLUTION INTRAVENOUS
Status: DISCONTINUED
Start: 2022-12-23 | End: 2022-12-23 | Stop reason: HOSPADM

## 2022-12-23 RX ORDER — CEFAZOLIN SODIUM 1 G/3ML
INJECTION, POWDER, FOR SOLUTION INTRAMUSCULAR; INTRAVENOUS AS NEEDED
Status: DISCONTINUED | OUTPATIENT
Start: 2022-12-23 | End: 2022-12-23 | Stop reason: HOSPADM

## 2022-12-23 RX ORDER — SODIUM CHLORIDE 9 MG/ML
INJECTION, SOLUTION INTRAVENOUS
Status: DISCONTINUED | OUTPATIENT
Start: 2022-12-23 | End: 2022-12-23 | Stop reason: HOSPADM

## 2022-12-23 RX ORDER — SODIUM CHLORIDE 9 MG/ML
25 INJECTION, SOLUTION INTRAVENOUS CONTINUOUS
Status: DISCONTINUED | OUTPATIENT
Start: 2022-12-23 | End: 2022-12-23 | Stop reason: HOSPADM

## 2022-12-23 RX ORDER — HEPARIN SODIUM 1000 [USP'U]/ML
INJECTION, SOLUTION INTRAVENOUS; SUBCUTANEOUS AS NEEDED
Status: DISCONTINUED | OUTPATIENT
Start: 2022-12-23 | End: 2022-12-23 | Stop reason: HOSPADM

## 2022-12-23 RX ORDER — FENTANYL CITRATE 50 UG/ML
50 INJECTION, SOLUTION INTRAMUSCULAR; INTRAVENOUS AS NEEDED
Status: CANCELLED | OUTPATIENT
Start: 2022-12-23

## 2022-12-23 RX ORDER — LIDOCAINE HYDROCHLORIDE 10 MG/ML
INJECTION, SOLUTION EPIDURAL; INFILTRATION; INTRACAUDAL; PERINEURAL
Status: DISCONTINUED
Start: 2022-12-23 | End: 2022-12-23 | Stop reason: HOSPADM

## 2022-12-23 RX ORDER — BUPIVACAINE HYDROCHLORIDE 2.5 MG/ML
INJECTION, SOLUTION EPIDURAL; INFILTRATION; INTRACAUDAL
Status: DISCONTINUED
Start: 2022-12-23 | End: 2022-12-23 | Stop reason: HOSPADM

## 2022-12-23 RX ORDER — MIDAZOLAM HYDROCHLORIDE 1 MG/ML
INJECTION, SOLUTION INTRAMUSCULAR; INTRAVENOUS AS NEEDED
Status: DISCONTINUED | OUTPATIENT
Start: 2022-12-23 | End: 2022-12-23 | Stop reason: HOSPADM

## 2022-12-23 RX ORDER — LIDOCAINE HYDROCHLORIDE 10 MG/ML
0.1 INJECTION, SOLUTION EPIDURAL; INFILTRATION; INTRACAUDAL; PERINEURAL AS NEEDED
Status: DISCONTINUED | OUTPATIENT
Start: 2022-12-23 | End: 2022-12-23 | Stop reason: HOSPADM

## 2022-12-23 RX ORDER — FAMOTIDINE 20 MG/1
20 TABLET, FILM COATED ORAL ONCE
Status: COMPLETED | OUTPATIENT
Start: 2022-12-23 | End: 2022-12-23

## 2022-12-23 RX ORDER — PROPOFOL 10 MG/ML
VIAL (ML) INTRAVENOUS
Status: DISCONTINUED | OUTPATIENT
Start: 2022-12-23 | End: 2022-12-23 | Stop reason: HOSPADM

## 2022-12-23 RX ORDER — PROPOFOL 10 MG/ML
INJECTION, EMULSION INTRAVENOUS AS NEEDED
Status: DISCONTINUED | OUTPATIENT
Start: 2022-12-23 | End: 2022-12-23 | Stop reason: HOSPADM

## 2022-12-23 RX ORDER — EPHEDRINE SULFATE/0.9% NACL/PF 50 MG/5 ML
SYRINGE (ML) INTRAVENOUS AS NEEDED
Status: DISCONTINUED | OUTPATIENT
Start: 2022-12-23 | End: 2022-12-23 | Stop reason: HOSPADM

## 2022-12-23 RX ORDER — INSULIN LISPRO 100 [IU]/ML
INJECTION, SOLUTION INTRAVENOUS; SUBCUTANEOUS ONCE
Status: COMPLETED | OUTPATIENT
Start: 2022-12-23 | End: 2022-12-23

## 2022-12-23 RX ORDER — HEPARIN SODIUM 200 [USP'U]/100ML
INJECTION, SOLUTION INTRAVENOUS
Status: COMPLETED | OUTPATIENT
Start: 2022-12-23 | End: 2022-12-23

## 2022-12-23 RX ORDER — FENTANYL CITRATE 50 UG/ML
INJECTION, SOLUTION INTRAMUSCULAR; INTRAVENOUS AS NEEDED
Status: DISCONTINUED | OUTPATIENT
Start: 2022-12-23 | End: 2022-12-23 | Stop reason: HOSPADM

## 2022-12-23 RX ORDER — MAGNESIUM SULFATE 100 %
4 CRYSTALS MISCELLANEOUS AS NEEDED
Status: CANCELLED | OUTPATIENT
Start: 2022-12-23

## 2022-12-23 RX ORDER — SODIUM CHLORIDE 0.9 % (FLUSH) 0.9 %
5-40 SYRINGE (ML) INJECTION EVERY 8 HOURS
Status: CANCELLED | OUTPATIENT
Start: 2022-12-23

## 2022-12-23 RX ADMIN — ONDANSETRON 4 MG: 2 INJECTION INTRAMUSCULAR; INTRAVENOUS at 09:26

## 2022-12-23 RX ADMIN — DEXMEDETOMIDINE HYDROCHLORIDE 6 MCG: 100 INJECTION, SOLUTION, CONCENTRATE INTRAVENOUS at 08:10

## 2022-12-23 RX ADMIN — Medication 10 MG: at 08:35

## 2022-12-23 RX ADMIN — MIDAZOLAM HYDROCHLORIDE 2 MG: 2 INJECTION, SOLUTION INTRAMUSCULAR; INTRAVENOUS at 07:52

## 2022-12-23 RX ADMIN — PHENYLEPHRINE HYDROCHLORIDE 100 MCG: 10 INJECTION INTRAVENOUS at 08:48

## 2022-12-23 RX ADMIN — SODIUM CHLORIDE: 9 INJECTION, SOLUTION INTRAVENOUS at 07:40

## 2022-12-23 RX ADMIN — PROPOFOL 50 MCG/KG/MIN: 10 INJECTION, EMULSION INTRAVENOUS at 07:57

## 2022-12-23 RX ADMIN — FENTANYL CITRATE 25 MCG: 50 INJECTION INTRAMUSCULAR; INTRAVENOUS at 08:36

## 2022-12-23 RX ADMIN — FENTANYL CITRATE 25 MCG: 50 INJECTION INTRAMUSCULAR; INTRAVENOUS at 08:56

## 2022-12-23 RX ADMIN — Medication 3 UNITS: at 07:29

## 2022-12-23 RX ADMIN — FENTANYL CITRATE 25 MCG: 50 INJECTION INTRAMUSCULAR; INTRAVENOUS at 08:08

## 2022-12-23 RX ADMIN — FENTANYL CITRATE 25 MCG: 50 INJECTION INTRAMUSCULAR; INTRAVENOUS at 09:06

## 2022-12-23 RX ADMIN — FAMOTIDINE 20 MG: 20 TABLET, FILM COATED ORAL at 07:29

## 2022-12-23 RX ADMIN — DEXMEDETOMIDINE HYDROCHLORIDE 4 MCG: 100 INJECTION, SOLUTION, CONCENTRATE INTRAVENOUS at 08:39

## 2022-12-23 RX ADMIN — PROPOFOL 100 MG: 10 INJECTION, EMULSION INTRAVENOUS at 08:27

## 2022-12-23 RX ADMIN — SODIUM CHLORIDE 25 ML/HR: 9 INJECTION, SOLUTION INTRAVENOUS at 07:24

## 2022-12-23 RX ADMIN — HEPARIN SODIUM 5000 UNITS: 1000 INJECTION, SOLUTION INTRAVENOUS; SUBCUTANEOUS at 08:37

## 2022-12-23 RX ADMIN — DEXMEDETOMIDINE HYDROCHLORIDE 6 MCG: 100 INJECTION, SOLUTION, CONCENTRATE INTRAVENOUS at 07:57

## 2022-12-23 RX ADMIN — LIDOCAINE HYDROCHLORIDE 80 MG: 20 INJECTION, SOLUTION EPIDURAL; INFILTRATION; INTRACAUDAL; PERINEURAL at 07:59

## 2022-12-23 RX ADMIN — Medication 10 MG: at 08:30

## 2022-12-23 RX ADMIN — CEFAZOLIN SODIUM 2 G: 1 INJECTION, POWDER, FOR SOLUTION INTRAMUSCULAR; INTRAVENOUS at 08:01

## 2022-12-23 NOTE — DISCHARGE INSTRUCTIONS
No lifting greater than 20lbs right arm x 2weeks. Ok to get wet in shower, do not submerge in water for 2 weeks. Report to clinic for fever, redness or drainage from the surgical site. DISCHARGE SUMMARY from Nurse    PATIENT INSTRUCTIONS:    After general anesthesia or intravenous sedation, for 24 hours or while taking prescription Narcotics:  Limit your activities  Do not drive and operate hazardous machinery  Do not make important personal or business decisions  Do  not drink alcoholic beverages  If you have not urinated within 8 hours after discharge, please contact your surgeon on call. Report the following to your surgeon:  Excessive pain, swelling, redness or odor of or around the surgical area  Temperature over 100.5  Nausea and vomiting lasting longer than 4 hours or if unable to take medications  Any signs of decreased circulation or nerve impairment to extremity: change in color, persistent  numbness, tingling, coldness or increase pain  Any questions        These are general instructions for a healthy lifestyle:    No smoking/ No tobacco products/ Avoid exposure to second hand smoke  Surgeon General's Warning:  Quitting smoking now greatly reduces serious risk to your health. Obesity, smoking, and sedentary lifestyle greatly increases your risk for illness    A healthy diet, regular physical exercise & weight monitoring are important for maintaining a healthy lifestyle    You may be retaining fluid if you have a history of heart failure or if you experience any of the following symptoms:  Weight gain of 3 pounds or more overnight or 5 pounds in a week, increased swelling in our hands or feet or shortness of breath while lying flat in bed. Please call your doctor as soon as you notice any of these symptoms; do not wait until your next office visit. The discharge information has been reviewed with the patient and spouse. The patient and spouse verbalized understanding.   Discharge medications reviewed with the patient and spouse and appropriate educational materials and side effects teaching were provided.   ___________________________________________________________________________________________________________________________________

## 2022-12-23 NOTE — H&P
HISTORY AND PHYSICAL    Patient: Sterling Berg Age: 81 y.o. Sex: female    YOB: 1950 Admit Date: 12/23/2022 PCP: Scott Marin MD   MRN: [id number]  [facility]: [id number]       ASSESSMENT/PLAN:  Below is the assessment and plan developed based on review of pertinent history, physical exam, labs, studies, and medications. 1. ESRD (end stage renal disease) on dialysis ([facility]  2. Asymptomatic stenosis of left carotid artery        Pt in need of permanent HD access. Discussed creation of AVF in detail and the risks involved such as bleeding, nerve damage and steal syndrome. She has adequate right cephalic vein but she is right handed. Her left arm cephalic vein is narrow at the antecubitum and she has chronic numbness and pain in her left hand secondary to an old injury. We will plan to proceed with a right brachicephalic avf. Pt verbalized understanding of the risks and benefits and all questions were answered. She elects to proceed. 2. No history of CVA. Recommend best medical mgt with antiplatelet and statin. She used to take Atorvastatin 80mg daily but stopped since starting dialysis. I recommended she restart this medication and patient has a supply at home and does not need a new medication. Additionally we will begin a yearly duplex surveillance of her extracranial arteries. Patient agrees with this plan. SUBJECTIVE/OBJECTIVE:  HPI  80 yo female with pmhx of ESRD on HD (T,TH,SAT) with CHF, COPD, CAD with recent hospital admission this month for dizziness. Acute CVA was ruled out but CTA head revealed some veterbral artery disease and mild carotid artery stenosis. She has been on dialysis for 16 weeks via a right IJ TDC. She is right handed. She had her vein mapping this morning. She does experience left hand numbness and pain from an old injury. No issues with right hand. No Known Allergies  ROS: Denies chest pain.   Past Medical History:   Diagnosis Date    Abnormal WBC count 05/17/2016    Anemia     Bilateral shoulder pain 09/27/2016    Chondromalacia of both patellae     COP[facility]ronic obstructive [facility] disease) [facility] 09/2015    mild-mod dz; Dr Hi Free    Diabetes [facility]) 2013    Diabetic eye exam ([facility] 2016    Dilated cardiomyopathy ([facility]     Dyslipidemia     Gout     Heart attack ([facility] 10/18/2019    Stented    History of echocardiogram 11/14/2014    Mild LVE. EF 40%. Mild, diffuse hypk. Mild LAE. Mild MR.       Hypercholesteremia 01/08/2014    Hypertension 2000    Noncompliance with medications 02/16/2016    Obesity     Orthostatic hypotension 05/17/2016    Osteoarthritis of both knees     Pain management 04/01/2016    Dr. Micheal Puente     Popliteal cyst, bilateral      Sleep apnea     not using cpap    Stage 3b chronic kidney disease ([facility] 08/08/2022    Vitamin D deficiency 10/16/2014     Past Surgical History:   Procedure Laterality Date    HX HEART CATHETERIZATION  2019    Coronary stent    HX TUBAL LIGATION      IR INSERT TUNL CVC W/O PORT OVER 5 YR  09/21/2022     Social History     Socioeconomic History    Marital status:      Spouse name: Not on file    Number of children: 3    Years of education: Not on file    Highest education level: Not on file   Occupational History    Occupation: retired   Tobacco Use    Smoking status: Former     Packs/day: 1.00     Years: 52.00     Pack years: 52.00     Types: Cigarettes     Quit date: 5/20/2019     Years since quitting: 3.5    Smokeless tobacco: Never   Vaping Use    Vaping Use: Never used   Substance and Sexual Activity    Alcohol use: No     Alcohol/week: 0.0 standard drinks    Drug use: Never    Sexual activity: Yes     Partners: Male   Other Topics Concern     Service No    Blood Transfusions No    Caffeine Concern No    Occupational Exposure No    Hobby Hazards No    Sleep Concern No    Stress Concern No    Weight Concern No    Special Diet No    Back Care No    Exercise No    Bike Helmet No    Seat Belt Yes    Self-Exams Yes   Social History Narrative    Not on file     Social Determinants of Health     Financial Resource Strain: Not on file   Food Insecurity: Not on file   Transportation Needs: Not on file   Physical Activity: Not on file   Stress: Not on file   Social Connections: Not on file   Intimate Partner Violence: Not on file   Housing Stability: Not on file     Social History     Tobacco Use   Smoking Status Former    Packs/day: 1.00    Years: 52.00    Pack years: 52.00    Types: Cigarettes    Quit date: 5/20/2019    Years since quitting: 3.5   Smokeless Tobacco Never     Family History   Problem Relation Age of Onset    Diabetes Mother     Hypertension Mother     Hypertension Father     Kidney Disease Maternal Aunt 48        Dialysis     No Known Allergies  Current Facility-Administered Medications   Medication Dose Route Frequency Provider Last Rate Last Admin    lidocaine (PF) (XYLOCAINE) 10 mg/mL (1 %) injection 0.1 mL  0.1 mL SubCUTAneous PRN Brein, Olga, CRNA        0.9% sodium chloride infusion  25 mL/hr IntraVENous CONTINUOUS Brein, Olga, CRNA 25 mL/hr at 12/23/22 0724 25 mL/hr at 12/23/22 0724    bupivacaine (PF) (MARCAINE) 0.25 % (2.5 mg/mL) injection             heparinized saline 2 units/mL 1,000 unit/500 mL infusion             lidocaine (PF) (XYLOCAINE) 10 mg/mL (1 %) injection                 EXAM: Visit Vitals  /63   Pulse 75   Temp 98.7 °F (37.1 °C)   Resp 20   Ht 5' 4\" (1.626 m)   Wt 213 lb (96.6 kg)   SpO2 95%   BMI 36.56 kg/m²   Physical Exam  General: Well-appearing male in no acute distress   HEENT: EOMI, no scleral icterus is noted. Pulmonary: No increased work or breathing is noted. Abdomen:  nondistended. Extremities: Warm and well perfused bilaterally. Vascular: Bilateral palp radial arteries  Neuro: Cranial nerves II through XII are grossly intact, bilateral hands motor and sensory intact.   Integument: No ulcerations are identified visibly  IMAGES VIEWED:  No results found.   Lab Results   Component Value Date/Time    WBC 5.6 12/13/2022 01:46 AM    HCT 32.4 (L) 12/13/2022 01:46 AM     Lab Results   Component Value Date     12/23/2022    CO2 27 12/23/2022    BUN 25 (H) 12/23/2022     Lab Results   Component Value Date    INR 1.0 09/09/2022    APTT 35.8 03/19/2021         Roberto Taveras MD  December 23, 2022  7:42 AM

## 2022-12-23 NOTE — DISCHARGE SUMMARY
A    Vascular Discharge Summary       Patient: Martha Lawton Age: 81 y.o. Sex: female    YOB: 1950 Admit Date: 12/23/2022 PCP: Lucas Diaz MD   MRN: [id number]  CSN: [id number]       Discharge Date: 12/23/2022 ESRD (end stage renal disease) ([email or web address] [N18.6]    Admit Diagnosis: ESRD (end stage renal disease) ([email or web address] [N18.6]  Discharge Diagnosis: Same    Problem List:  Patient Active Problem List   Diagnosis Code    Cardiomyopathy, dilated ([email or web address] I42.0    Environmental allergies Z91.09    Chronic obstructive pulmonary disease with acute exacerbation ([email or web address] J44.1    Essential hypertension I10    Iron deficiency anemia D50.9    Decreased GFR R94.4    Dyslipidemia, goal LDL below 70 E78.5    Former smoker Z87.891    Osteoarthritis of both knees M17.0    Chronic pain of both knees M25.561, M25.562, G89.29    Chronic pain syndrome G89.4    Primary osteoarthritis of both knees M17.0    Chronic obstructive pulmonary disease (HCC) J44.9    Compliance with medication regimen Z91.89    Bilateral shoulder pain M25.511, M25.512    Elevated alkaline phosphatase level R74.8    Caregiver stress Z63.6    STEMI (ST elevation myocardial infarction) ([email or web address] I21.3    Acute pulmonary edema (HCC) J81.0    Coronary artery disease involving native coronary artery of native heart without angina pectoris I25.10    Pulmonary edema J81.1    SOB (shortness of breath) R06.02    Coronary artery disease involving native coronary artery with unstable angina pectoris (HCC) I25.110    Type 2 diabetes with nephropathy (HCC) E11.21    Severe obesity (BMI 35.0-39. 9) with comorbidity ([facility]) E66.01    COPD exacerbation (HCC) J44.1    Combined systolic and diastolic congestive heart failure (HCC) I50.40    Type 2 diabetes mellitus with hyperglycemia (HCC) E11.65    TESSA on CPAP G47.33, Z99.89    Ischemic cardiomyopathy I25.5    Syncope R55    Flash pulmonary edema (HCC) J81.0    Elevated d-dimer R79.89    Acute respiratory failure with hypoxia (HCC) J96.01    Pulmonary edema cardiac cause (HCC) I50.1    COPD with acute exacerbation (HCC) J44.1    Respiratory failure requiring intubation (HCC) J96.90    CAD (coronary artery disease) I25.10    Chronic systolic congestive heart failure (HCC) I50.22    Nonrheumatic mitral valve regurgitation I34.0    Sepsis (HCC) A41.9    Hypotension I95.9    Stage 1 acute kidney injury (HCC) N17.9    Hyperkalemia E87.5    Acute hyperkalemia E87.5    Breast cancer (HCC) C50.919    Rectal cancer (HCC) C20    History of myocardial infarction I25.2    Pulmonary hypertension (HCC) I27.20    Acute exacerbation of CHF (congestive heart failure) (HCC) I50.9    Pneumonia due to COVID-19 virus U07.1, J12.82    Acute on chronic diastolic heart failure (HCC) I50.33    Stage 3b chronic kidney disease (HCC) N18.32    CHF (congestive heart failure) (HCC) I50.9    CVA (cerebral vascular accident) ([facility] I63.9    ESRD (end stage renal disease) on dialysis ([facility] N18.6, Z99.2    TIA (transient ischemic attack) G45.9    Dizziness R42    Stenosis of both vertebral arteries I65.03     Hospital Problems  Date Reviewed: 12/22/2022   None       Operative Procedures:  Event Time In   Patient In - Facility (Arrived) 0145   Patient In - Pre-op 3874   Patient Ready for OR    Surgeon Available    Patient Out - Pre-op 9486   Anesthesia Start 0407   Patient In - OR 6418   Surgeon In OR 0800   Incision Start 0815   Incision Close 4920   Surgeon out of OR 1710   Anesthesia Stop    Patient Out - OR    Patient In - Phase I    Care Complete - Phase I    Patient Out - Phase I    Patient In - Phase II    Patient Tolerates Liquids    Patient Ready for Visitors    Patient Education Complete    Patient Voided    Care Complete - Phase II    Patient Out - Phase II    End of Periop Care    Patient In - Overflow    Patient Out - Overflow    Procedure Start    Procedure End    Sedation Start    Sedation Finish    ESRD (end stage renal disease) ([facility] [N18.6]  Procedures in Panel 1:RIGHT UPPER EXTREMITY BRACHIOCEPALIC ARTERIO VENOUS FISTULA CREATION  All Procedures: Procedure(s):  RIGHT UPPER EXTREMITY BRACHIOCEPALIC ARTERIO VENOUS FISTULA CREATION  Procedures with laterality: Procedure(s) (LRB):  RIGHT UPPER EXTREMITY BRACHIOCEPALIC ARTERIO VENOUS FISTULA CREATION (Right)  Procedures with codes: RIGHT UPPER EXTREMITY BRACHIOCEPALIC ARTERIO VENOUS FISTULA CREATION:     Hospital Course:   Underwent successful right upper extremity brachiocephalic AVF. Most Recent BMP and CBC:  Lab Results   Component Value Date     (H) 12/23/2022    BUN 25 (H) 12/23/2022    CA 9.2 12/23/2022    CREA 2.76 (H) 12/23/2022     12/23/2022    K 4.3 12/23/2022     12/23/2022    CO2 27 12/23/2022     Lab Results   Component Value Date    WBC 5.6 12/13/2022    HGB 9.7 (L) 12/13/2022    HCT 32.4 (L) 12/13/2022     12/13/2022     Lab Results   Component Value Date    INR 1.0 09/09/2022    APTT 35.8 03/19/2021       Discharge Meds:   New MED: Percocet 5mg/325mg  Take one tablet q6hrs prn pain. My Medications        ASK your doctor about these medications        Instructions Each Dose to Equal Morning Noon Evening Bedtime   albuterol-ipratropium 2.5 mg-0.5 mg/3 ml Nebu  Commonly known as: DUO-NEB    Your last dose was: Your next dose is:         3 mL by Nebulization route every six (6) hours as needed for Wheezing. 3 mL                 aspirin 81 mg chewable tablet    Your last dose was: Your next dose is: Take 2 Tabs by mouth daily. 162 mg                 gabapentin 400 mg capsule  Commonly known as: NEURONTIN    Your last dose was: Your next dose is: Take 400 mg by mouth in the morning. 400 mg                 hydrALAZINE 100 mg tablet  Commonly known as: APRESOLINE    Your last dose was: Your next dose is: Take 100 mg by mouth two (2) times a day.    100 mg                 insulin detemir U-100 100 unit/mL injection  Commonly known as: Levemir U-100 Insulin    Your last dose was: Your next dose is:         15 units daily for diabetes once daily with lunch                  metoprolol tartrate 25 mg tablet  Commonly known as: LOPRESSOR    Your last dose was: Your next dose is: Take 1 Tablet by mouth two (2) times a day. 25 mg                 tiZANidine 4 mg tablet  Commonly known as: Christy Kelli    Your last dose was: Your next dose is: Take 4 mg by mouth two (2) times daily as needed. 4 mg                 umeclidinium-vilanteroL 62.5-25 mcg/actuation inhaler  Commonly known as: Anoro Ellipta    Your last dose was: Your next dose is: Take 1 Puff by inhalation daily. For COPD   1 Puff                          Condition at discharge:   Afebrile  Ambulating  Eating, Drinking, Voiding  Stable    Disposition: stable, d/c home and follow up in clinic in 2 weeks.       Tuan Chin MD  December 23, 2022   9:41 AM     .

## 2022-12-23 NOTE — BRIEF OP NOTE
Brief Postoperative Note    Patient: Glen Valdez  YOB: 1950  MRN: [id number]    Date of Procedure: 12/23/2022     Pre-Op Diagnosis: [facility] [N18.6]    Post-Op Diagnosis: Same as preoperative diagnosis. Procedure(s):  RIGHT UPPER EXTREMITY BRACHIOCEPALIC ARTERIO VENOUS FISTULA CREATION    Surgeon(s):  Aissatou Emerson MD    Surgical Assistant: Surg Asst-1: Rebekah Abad    Anesthesia: MAC     Estimated Blood Loss (mL): less than 50     Complications: None    Specimens: * No specimens in log *     Implants: * No implants in log *    Drains:   [REMOVED] External Urinary Catheter 08/08/22 (Removed)       Findings: adequated sized right brachial artery and cephalic vein.  Good thrill at case end with preserved multiphasic doppler signal at wrist.    Electronically Signed by Elvia Gerard MD on 12/23/2022 at 9:37 AM

## 2022-12-23 NOTE — OP NOTES
A-V Fistula Op Note    Patient: Jorge Yarbrough Age: 81 y.o. Sex: female    YOB: 1950 Admit Date: 12/23/2022 PCP: Magdiel Tierney MD   MRN: [id number]  CSN: [id number]       Date of Surgery: 12/23/2022    Surgeon(s):  Ashwin Tafoya MD     Pre-operative Diagnosis: ESRD  Post-operative Diagnosis: same    Procedure(s) Performed: Right upper extremity brachiocephalic AVF. Anesthesia:  General     Findings:  normal    Complications:  none    Estimated Blood Loss:  none    Tubes and Drains:  none    Specimens: none    Disposition: PACU then home    The patient was placed in the supine position. The right arm was prepped with Chlorprep and draped in a sterile manner. A skin incision in the upper arm adjacent to the antecubital fossa  was performed, underlying soft tissue divided and the underlying artery and vein exposed. IV heparin 5000 units was administered . The cephalic vein was dissected, divided and distended with heparin-saline. The artery was clamped, opened with a #11 blade and the anastomoses performed in an end-to-side manner utilizing  running 6-0 prolene suture. The clamps were then removed in a manner to prevent debris or air embolism. A pulse and thrill over the newly created AV fistula was present. A Doppler signal was present in the runoff vein. The radial artery Doppler signal was present and mutlitphasic . The anastomosis was hemostatic. The tissues were approximated with 2-0 vicryl interrupted ; the skin was closed with 4-0 monocryl . The skin was dressed with dermabond . The sponge, instrument and needle count was correct at the end of the case. The patient tolerated the procedure well without any complications.     Ashwin Tafoya MD   December 23, 2022  9:51 AM

## 2022-12-23 NOTE — ANESTHESIA POSTPROCEDURE EVALUATION
Procedure(s):  RIGHT UPPER EXTREMITY BRACHIOCEPALIC ARTERIO VENOUS FISTULA CREATION.     MAC, general    Anesthesia Post Evaluation      Multimodal analgesia: multimodal analgesia used between 6 hours prior to anesthesia start to PACU discharge  Patient location during evaluation: PACU  Patient participation: complete - patient participated  Level of consciousness: awake and alert  Pain management: adequate  Airway patency: patent  Anesthetic complications: no  Cardiovascular status: acceptable  Respiratory status: acceptable  Hydration status: acceptable  Post anesthesia nausea and vomiting:  none  Final Post Anesthesia Temperature Assessment:  Normothermia (36.0-37.5 degrees C)      INITIAL Post-op Vital signs:   Vitals Value Taken Time   /81 12/23/22 1038   Temp 36.4 °C (97.5 °F) 12/23/22 0945   Pulse 72 12/23/22 1041   Resp 17 12/23/22 1041   SpO2 97 % 12/23/22 1041

## 2022-12-23 NOTE — ANESTHESIA PREPROCEDURE EVALUATION
Anesthetic History               Review of Systems / Medical History  Patient summary reviewed    Pulmonary    COPD: mild    Sleep apnea: No treatment    Asthma : well controlled       Neuro/Psych         TIA  Pertinent negatives: No CVA   Cardiovascular    Hypertension      CHF: NYHA Classification II    Past MI and CAD    Exercise tolerance: <4 METS  Comments: Hx of MI  In 2019, CHF since in presence of CRI that progressed to ESRD, now on HD for last 10-12 weeks with improvement in PIERSON, and SOB at rest.     GI/Hepatic/Renal         Renal disease: dialysis and ESRD      Comments: TThSat hemodialysis, BMP pending but last labs OK.  Endo/Other    Diabetes: poorly controlled, type 2, using insulin    Morbid obesity and arthritis     Other Findings            Physical Exam    Airway  Mallampati: II  TM Distance: 4 - 6 cm  Neck ROM: normal range of motion   Mouth opening: Normal     Cardiovascular    Rhythm: regular  Rate: abnormal         Dental    Dentition: Edentulous     Pulmonary  Breath sounds clear to auscultation               Abdominal         Other Findings            Anesthetic Plan    ASA: 3  Anesthesia type: MAC          Induction: Intravenous  Anesthetic plan and risks discussed with: Patient

## 2023-01-03 ENCOUNTER — OFFICE VISIT (OUTPATIENT)
Dept: VASCULAR SURGERY | Age: 73
End: 2023-01-03
Payer: MEDICARE

## 2023-01-03 VITALS
SYSTOLIC BLOOD PRESSURE: 110 MMHG | HEART RATE: 77 BPM | DIASTOLIC BLOOD PRESSURE: 62 MMHG | HEIGHT: 64 IN | OXYGEN SATURATION: 97 % | WEIGHT: 213 LBS | BODY MASS INDEX: 36.37 KG/M2

## 2023-01-03 DIAGNOSIS — Z99.2 ESRD (END STAGE RENAL DISEASE) ON DIALYSIS (HCC): Primary | ICD-10-CM

## 2023-01-03 DIAGNOSIS — N18.6 ESRD (END STAGE RENAL DISEASE) ON DIALYSIS (HCC): Primary | ICD-10-CM

## 2023-01-03 PROCEDURE — 99024 POSTOP FOLLOW-UP VISIT: CPT | Performed by: SURGERY

## 2023-01-03 NOTE — PROGRESS NOTES
Geraldo Malone (: 1950) is a 81 y.o. female here for evaluation of the following chief complaint(s):  End Stage Renal Disease (Follow up from surgery)       ASSESSMENT/PLAN:  Below is the assessment and plan developed based on review of pertinent history, physical exam, labs, studies, and medications. 1. ESRD (end stage renal disease) on dialysis ([address]  -     DUPLEX UPPER EXT ARTERY RIGHT; Future  MRS VICENTE IS 2 WEEKS FOLLOW UP RIGHT KIM AVF DOING WELL WITH EXCELLENT THRILL AND BRUIT IN THE GRAFT. SHE WILL SEE US BACK IN CLINIC IN ONE MONTH WITH A MATURATION DUPLEX. WE DISCUSSED THAT SHE MAY NEED A SECOND OPERATION AT THAT POINT TO SUPERFICIALIZE THE FISTULA AS IT APPEARS TO BE DEEP. Return in about 1 month (around 2/3/2023) for follow up visit FOR AVF MATURATION DUPLEX. SUBJECTIVE/OBJECTIVE:  HPI  82 YO FEMALE IS HERE FOR SURGICAL FOLLOW 2 WEEKS S/P RIGHT UPPER EXTREMITY BRACHIOCEPHALIC AVF. SHE IS DOING WELL. SHE DOES COMPLAIN OF SOME FINGERTIP NUMBNESS BUT NO PAIN. HAND STRENGTH IS NORMAL. INCISION IS DOING WELL AND IS WITHOUT REDNESS OR DRAINAGE. NO FEVER. No Known Allergies    Current Outpatient Medications   Medication Sig    hydrALAZINE (APRESOLINE) 100 mg tablet Take 100 mg by mouth two (2) times a day. tiZANidine (ZANAFLEX) 4 mg tablet Take 4 mg by mouth two (2) times daily as needed. metoprolol tartrate (LOPRESSOR) 25 mg tablet Take 1 Tablet by mouth two (2) times a day. insulin detemir U-100 (Levemir U-100 Insulin) 100 unit/mL injection 15 units daily for diabetes once daily with lunch    gabapentin (NEURONTIN) 400 mg capsule Take 400 mg by mouth in the morning. albuterol-ipratropium (DUO-NEB) 2.5 mg-0.5 mg/3 ml nebu 3 mL by Nebulization route every six (6) hours as needed for Wheezing. aspirin 81 mg chewable tablet Take 2 Tabs by mouth daily. umeclidinium-vilanterol (ANORO ELLIPTA) 62.5-25 mcg/actuation inhaler Take 1 Puff by inhalation daily.  For COPD     No current facility-administered medications for this visit. Past Medical History:   Diagnosis Date    Abnormal WBC count 05/17/2016    Anemia     Bilateral shoulder pain 09/27/2016    Chondromalacia of both patellae     COPD (chronic obstructive pulmonary disease) ([facility] 09/2015    mild-mod dz; Dr Chary Georges    Diabetes [facility]) 2013    Diabetic eye exam ([facility] 2016    Dilated cardiomyopathy ([facility]     Dyslipidemia     Gout     Heart attack ([facility] 10/18/2019    Stented    History of echocardiogram 11/14/2014    Mild LVE. EF 40%. Mild, diffuse hypk. Mild LAE. Mild MR. Hypercholesteremia 01/08/2014    Hypertension 2000    Noncompliance with medications 02/16/2016    Obesity     Orthostatic hypotension 05/17/2016    Osteoarthritis of both knees     Pain management 04/01/2016    Dr. Jaleesa Polk     Popliteal cyst, bilateral      Sleep apnea     not using cpap    Stage 3b chronic kidney disease ([facility] 08/08/2022    Vitamin D deficiency 10/16/2014       Past Surgical History:   Procedure Laterality Date    HX HEART CATHETERIZATION  2019    Coronary stent    HX TUBAL LIGATION      IR INSERT TUNL CVC W/O PORT OVER 5 YR  09/21/2022       Family History   Problem Relation Age of Onset    Diabetes Mother     Hypertension Mother     Hypertension Father     Kidney Disease Maternal Aunt 48        Dialysis       Social History     Tobacco Use   Smoking Status Former    Packs/day: 1.00    Years: 52.00    Pack years: 52.00    Types: Cigarettes    Quit date: 5/20/2019    Years since quitting: 3.6   Smokeless Tobacco Never         Review of Systems  No chest pain, no shortness of breath, positive joint pain, no fever. Physical Exam  General: Well-appearing male in no acute distress   HEENT: EOMI, no scleral icterus is noted. Pulmonary: No increased work or breathing is noted. Abdomen:  nondistended. Extremities: Warm and well perfused bilaterally. Vascular: Right upper ext AVF with excellent thrill and bruit. Incision is clean and dry. Right radial pulse is palpable. Right hand strength wnl, sensory intact. Neuro: Cranial nerves II through XII are grossly intact   Integument: No ulcerations are identified visibly          An electronic signature was used to authenticate this note.   -- Nanci Srinivasan MD

## 2023-01-09 ENCOUNTER — OFFICE VISIT (OUTPATIENT)
Dept: ORTHOPEDIC SURGERY | Age: 73
End: 2023-01-09
Payer: MEDICARE

## 2023-01-09 DIAGNOSIS — M25.562 LEFT KNEE PAIN, UNSPECIFIED CHRONICITY: Primary | ICD-10-CM

## 2023-01-09 DIAGNOSIS — M17.12 ARTHRITIS OF KNEE, LEFT: ICD-10-CM

## 2023-01-09 DIAGNOSIS — M25.462 EFFUSION OF LEFT KNEE: ICD-10-CM

## 2023-01-09 DIAGNOSIS — M17.11 ARTHRITIS OF RIGHT KNEE: ICD-10-CM

## 2023-01-09 DIAGNOSIS — M25.462 EFFUSION OF LEFT KNEE JOINT: ICD-10-CM

## 2023-01-09 DIAGNOSIS — M25.662 DECREASED RANGE OF MOTION (ROM) OF LEFT KNEE: ICD-10-CM

## 2023-01-09 DIAGNOSIS — E66.01 SEVERE OBESITY (BMI 35.0-39.9) WITH COMORBIDITY (HCC): ICD-10-CM

## 2023-01-09 DIAGNOSIS — M23.8X2 KNEE CREPITUS, LEFT: ICD-10-CM

## 2023-01-09 PROCEDURE — 20611 DRAIN/INJ JOINT/BURSA W/US: CPT | Performed by: PHYSICIAN ASSISTANT

## 2023-01-09 PROCEDURE — 1090F PRES/ABSN URINE INCON ASSESS: CPT | Performed by: PHYSICIAN ASSISTANT

## 2023-01-09 PROCEDURE — G8399 PT W/DXA RESULTS DOCUMENT: HCPCS | Performed by: PHYSICIAN ASSISTANT

## 2023-01-09 PROCEDURE — G8417 CALC BMI ABV UP PARAM F/U: HCPCS | Performed by: PHYSICIAN ASSISTANT

## 2023-01-09 PROCEDURE — 99214 OFFICE O/P EST MOD 30 MIN: CPT | Performed by: PHYSICIAN ASSISTANT

## 2023-01-09 PROCEDURE — 1101F PT FALLS ASSESS-DOCD LE1/YR: CPT | Performed by: PHYSICIAN ASSISTANT

## 2023-01-09 PROCEDURE — 1111F DSCHRG MED/CURRENT MED MERGE: CPT | Performed by: PHYSICIAN ASSISTANT

## 2023-01-09 PROCEDURE — G8432 DEP SCR NOT DOC, RNG: HCPCS | Performed by: PHYSICIAN ASSISTANT

## 2023-01-09 PROCEDURE — G8536 NO DOC ELDER MAL SCRN: HCPCS | Performed by: PHYSICIAN ASSISTANT

## 2023-01-09 PROCEDURE — G8428 CUR MEDS NOT DOCUMENT: HCPCS | Performed by: PHYSICIAN ASSISTANT

## 2023-01-09 PROCEDURE — 1123F ACP DISCUSS/DSCN MKR DOCD: CPT | Performed by: PHYSICIAN ASSISTANT

## 2023-01-09 PROCEDURE — G9711 PT HX TOT COL OR COLON CA: HCPCS | Performed by: PHYSICIAN ASSISTANT

## 2023-01-09 RX ORDER — TRIAMCINOLONE ACETONIDE 40 MG/ML
40 INJECTION, SUSPENSION INTRA-ARTICULAR; INTRAMUSCULAR ONCE
Status: COMPLETED | OUTPATIENT
Start: 2023-01-09 | End: 2023-01-09

## 2023-01-09 RX ADMIN — TRIAMCINOLONE ACETONIDE 40 MG: 40 INJECTION, SUSPENSION INTRA-ARTICULAR; INTRAMUSCULAR at 16:12

## 2023-01-09 NOTE — PROGRESS NOTES
Patient: Velasquez Vaughn                MRN: [id number]       SSN: [id number]  YOB: 1950        AGE: 81 y.o. SEX: female          PCP: Owen Delgado MD  01/09/23          HISTORY:  Velasquez Vaughn is a 81 y.o. female returns the office for follow-up and a complaint of acute on chronic exacerbation of left knee pain with swelling. Patient has known osteoarthritis tricompartmental of the left knee. She was previously treated back in the summer 2022 for similar on the right knee. She did fairly well following the cortisone injection and aspiration of the right knee and is having symptoms of right knee pain today but left is greater than right. No trauma reported. Patient is end-stage renal and does receive hemodialysis. The surgeons are currently in the process of moving her dialysis port to her right upper arm from chest.    Pain in the both knees limit her ability to stand for only short periods of time and walk short distances.           Pain Assessment  1/3/2023   Location of Pain Hand;Arm   Location Modifiers Right   Severity of Pain 10   Quality of Pain -   Quality of Pain Comment -   Duration of Pain -   Frequency of Pain -   Aggravating Factors -   Aggravating Factors Comment -   Limiting Behavior -   Relieving Factors -   Relieving Factors Comment -   Result of Injury -   Work-Related Injury -   Type of Injury -   Type of Injury Comment -           Lab Results   Component Value Date/Time    Hemoglobin A1c 6.7 (H) 12/09/2022 12:48 PM     Weight Metrics 1/3/2023 12/23/2022 12/21/2022 12/12/2022 10/8/2022 9/21/2022 9/5/2022   Weight 213 lb 213 lb 215 lb 215 lb 8 oz 243 lb 9.6 oz 243 lb 9.6 oz 218 lb   BMI 36.56 kg/m2 36.56 kg/m2 36.9 kg/m2 36.99 kg/m2 41.81 kg/m2 41.81 kg/m2 37.42 kg/m2            Problem List Items Addressed This Visit    None  Visit Diagnoses       Left knee pain, unspecified chronicity    -  Primary PAST MEDICAL HISTORY:   Past Medical History:   Diagnosis Date    Abnormal WBC count 05/17/2016    Anemia     Bilateral shoulder pain 09/27/2016    Chondromalacia of both patellae     COPD [facility]chronic obstructive pulmonary [facility]) [facility] 09/2015    mild-mod dz; Dr Theadore Dubin    Diabetes [facility]) 2013    Diabetic eye exam ([facility] 2016    Dilated cardiomyopathy ([facility]     Dyslipidemia     Gout     Heart attack ([facility] 10/18/2019    Stented    History of echocardiogram 11/14/2014    Mild LVE. EF 40%. Mild, diffuse hypk. Mild LAE. Mild MR. Hypercholesteremia 01/08/2014    Hypertension 2000    Noncompliance with medications 02/16/2016    Obesity     Orthostatic hypotension 05/17/2016    Osteoarthritis of both knees     Pain management 04/01/2016    Dr. Shelbi Parsonon     Popliteal cyst, bilateral      Sleep apnea     not using cpap    Stage 3b chronic kidney disease ([facility] 08/08/2022    Vitamin D deficiency 10/16/2014       PAST SURGICAL HISTORY:   Past Surgical History:   Procedure Laterality Date    HX HEART CATHETERIZATION  2019    Coronary stent    HX TUBAL LIGATION      IR INSERT TUNL CVC W/O PORT OVER 5 YR  09/21/2022       ALLERGIES: No Known Allergies     CURRENT MEDICATIONS:  A list of medications prior to the time of admission include:  Prior to Admission medications    Medication Sig Start Date End Date Taking? Authorizing Provider   hydrALAZINE (APRESOLINE) 100 mg tablet Take 100 mg by mouth two (2) times a day. 10/31/22   Provider, Historical   tiZANidine (ZANAFLEX) 4 mg tablet Take 4 mg by mouth two (2) times daily as needed. 11/14/22   Provider, Historical   metoprolol tartrate (LOPRESSOR) 25 mg tablet Take 1 Tablet by mouth two (2) times a day. 10/8/22   Summer Walls MD   insulin detemir U-100 (Levemir U-100 Insulin) 100 unit/mL injection 15 units daily for diabetes once daily with lunch 9/22/22   Holly Fitch MD   gabapentin (NEURONTIN) 400 mg capsule Take 400 mg by mouth in the morning. Other, MD Tg   albuterol-ipratropium (DUO-NEB) 2.5 mg-0.5 mg/3 ml nebu 3 mL by Nebulization route every six (6) hours as needed for Wheezing. 3/23/21   Guadalupe Craig MD   aspirin 81 mg chewable tablet Take 2 Tabs by mouth daily. 7/19/19   Kendra Rabago NP   umeclidinium-vilanterol (ANORO ELLIPTA) 62.5-25 mcg/actuation inhaler Take 1 Puff by inhalation daily. For COPD 4/26/17   Mariana Irizarry DNP       FAMILY HISTORY:   Family History   Problem Relation Age of Onset    Diabetes Mother     Hypertension Mother     Hypertension Father     Kidney Disease Maternal Aunt 48        Dialysis       SOCIAL HISTORY:   Social History     Socioeconomic History    Marital status:     Number of children: 3   Occupational History    Occupation: retired   Tobacco Use    Smoking status: Former     Packs/day: 1.00     Years: 52.00     Pack years: 52.00     Types: Cigarettes     Quit date: 5/20/2019     Years since quitting: 3.6    Smokeless tobacco: Never   Vaping Use    Vaping Use: Never used   Substance and Sexual Activity    Alcohol use: No     Alcohol/week: 0.0 standard drinks    Drug use: Never    Sexual activity: Yes     Partners: Male   Other Topics Concern     Service No    Blood Transfusions No    Caffeine Concern No    Occupational Exposure No    Hobby Hazards No    Sleep Concern No    Stress Concern No    Weight Concern No    Special Diet No    Back Care No    Exercise No    Bike Helmet No    Seat Belt Yes    Self-Exams Yes       ROS:No CP, No SOB, No fever/chills nor night sweats. No headaches, vision abnormalities to include double and or loss of vision. No dizziness. No hearing abnormalities. No Chest Pain nor Shortness of breath. Pt denies h/o spinal surgery, injections, or PT/chiropractor. Patient has attempted self treatment with less than adequate relief on oral and topical analgesic / anti inflammatory medications . Pt denies change in bowel or bladder habits.  No saddle paresthesia / anesthesia. Pt denies fever, unplanned weight loss / weight gains, and no skin changes. Musculoskeletal pain per HPI. Pain is exacerbated positionally. PHYSICAL EXAM:    There were no vitals taken for this visit. Constitutional: Appears well-developed and well-nourished. No distress. Sitting comfortably in the exam room, interacting with conversation with pleasant affect. Gait appears steady and patient exhibits no evidence of ataxia. Patient is able to ambulate with caution. No focal neurological deficit noted. No facial droop, slurred speech, or evidence of altered mentation noted on exam.   Skin: Skin over the head, neck, bilateral limbs, and trunk is warm and dry. No rash or erythema noted. Cranial Nerves II-XII grossly intact  HENT: NC/AT. Normal symmetry, bulk and tone of facial and neck musculature. Trachea midline. No discernible thyromegaly or masses. No involuntary movements. Lymphatic: No preauricular, submandibuar, anterior or posterior cervical lymphadenopathy. Psychiatric: The patient is awake, alert, and oriented to person, place and time. Behavior is normal. Thought content normal.   Cardiovascular: No clubbing, cyanosis. No edema bilateral lower extremities. Pulmonary: No tripoding nor accessory muscle recruitment. Breathing normally, no distress, no audible wheezing. Distal cap refill intact at 2/2 Wiyl UE / LE. Neuro intact Wily UE/LE to noxious stimuli        Ortho Specific exam:          Left knee reveals skin intact. No warmth, erythema, edema, or ecchymosis. There is a 1+ effusion noted. While supine active range of motion 105 degrees -5 degrees with a varus deformity in extension plane. MCL LCL intact no laxity pain or giveaway. ACL PCL intact no laxity pain or giveaway. Patella tracks midline with moderate crepitation with quad and patellar tendons intact. Popliteal fullness is noted.     X-ray: Historical tricompartmental osteoarthritis of the left knee                  IMPRESSION:      ICD-10-CM ICD-9-CM    1. Left knee pain, unspecified chronicity  M25.562 719.46       2. Left knee osteoarthritis  3. Decreased range of motion left knee secondary to above  4. Left knee effusion  5. Right knee osteoarthritis  6. Decreased range of motion of the right knee secondary to above  7. Obesity BMI 36.56    PLAN: Today we discussed alternatives to care to include but not limited to a aspiration with following cortisone injection for left knee acute on chronic symptoms. In addition she may benefit from hyaluronic acid if patient fails to improve from current therapy or worsens. We will plan on seeing her back in about 7 to 10 days for evaluation of the right knee and if necessary perform associated aspiration injection. Patient agreed with plan of care. Today all of her questions answered to her satisfaction. Weight loss strategies again discussed in detail as below. Procedural: Using sterile technique and verbal and written consent were obtained appropriate timeout formed patient laying supine left knee flexed to 20 degrees on a bolster 5 cc of 0.75% Sensorcaine used anesthetize a superior lateral interarticular approach. To follow 11 cc of straw-colored blood tinted aspirate removed from the same portal.  Fluid was discarded. To follow 1 cc of Kenalog at 40 mg/mL mixed with 7 mils of Sensorcaine 0.75% injected. Patient tolerated the procedure well. Chart reviewed for the following:   Re Johnston PA-C, have reviewed the History, Physical and updated the Allergic reactions for [address] performed immediately prior to start of procedure:   Inocencia MCCLAIN PA-C, have performed the following reviews on Fariha Ro prior to the start of the procedure:            * Patient was identified by name and date of birth   * Agreement on procedure being performed was verified  * Risks and Benefits explained to the patient  * Procedure site verified and marked as necessary  * Patient was positioned for comfort  * Consent was signed and verified             Date of procedure: 01/09/23    Time: 4:14 PM    Procedure performed by:  Kojo Ross PA-C    Provider assisted by: None     Patient assisted by: self    How tolerated by patient: tolerated the procedure well with no complications    Comments: none    The patient is asked to continue to rest the area for a few more days before resuming regular activities. It may be more painful for the first 1-2 days. Watch for fever, or increased swelling or persistent pain in the joint. Call or return to clinic prn if such symptoms occur or there is failure to improve as anticipated. Please Note:        701 Local Magnet Loop using a frequency of 10MHz with a 12L-RS transducer head was used to confirm needle placement. Ultrasound images captured using [employer] Ultrasound machine and scanned into patient's chart. Additionally today we discussed the diagnosis of obesity and the importance of weight management for both cardiovascular health. The patient was recommended to decrease carbohydrate and sugar intake. Patient recommended a formal dietary consult which they will consider and return a call to our office. In light of the patient's osteoarthritic findings I am making a recommendation for aerobic exercise to include but not limited to stationary bicycle, elliptical, therapeutic walking with good shoes and or swimming. Patient should avoid any running or jumping. If using the treadmill then recommendation for no elevation and no running or jogging. Appropriate for outpatient management. Will discuss supportive treatment, NSAIDS, RICE and orthopedic follow-up. Discussed treatment plan, return precautions, symptomatic relief, and expected time to improvement. All questions answered. Patient is stable for discharge and outpatient management.  Medication use, risk/benefit, side effects, and precautions discussed. Care plan outlined and precautions discussed. Results were reviewed with the patient. All medications were reviewed with the patient. All of pt's questions and concerns were addressed. Alarm symptoms and return precautions associated with chief complaint and evaluation were reviewed with the patient in detail. The patient demonstrated adequate understanding. The patient expresses willing compliance with the treatment plan. Special note: Medication management discussed in detail all patient's questions answered to their satisfaction. Patient provided a reminder for a \"due or due soon\" health maintenance. I have asked the patient to schedule an appointment with their primary care provider for follow-up on general health maintenance concerns. Today all the patient's questions were answered to their satisfaction. Copies of x-rays reviewed if obtained this visit, and provided to patient. Dictation disclaimer:  Please note that this dictation was completed with [employer], the computer voice recognition software. Quite often unanticipated grammatical, syntax, homophones, and other interpretive errors are inadvertently transcribed by the computer software. Please disregard these errors. Please excuse any errors that have escaped final proofreading. Chele MORLEY, APC, MPAS, PA-C  [facility]

## 2023-01-13 ENCOUNTER — APPOINTMENT (OUTPATIENT)
Dept: GENERAL RADIOLOGY | Age: 73
End: 2023-01-13
Attending: STUDENT IN AN ORGANIZED HEALTH CARE EDUCATION/TRAINING PROGRAM
Payer: MEDICARE

## 2023-01-13 ENCOUNTER — HOSPITAL ENCOUNTER (INPATIENT)
Age: 73
LOS: 12 days | Discharge: HOME HEALTH | End: 2023-01-25
Attending: STUDENT IN AN ORGANIZED HEALTH CARE EDUCATION/TRAINING PROGRAM | Admitting: HOSPITALIST
Payer: MEDICARE

## 2023-01-13 DIAGNOSIS — J96.01 ACUTE RESPIRATORY FAILURE WITH HYPOXIA (HCC): ICD-10-CM

## 2023-01-13 DIAGNOSIS — N18.6 END-STAGE RENAL DISEASE ON HEMODIALYSIS (HCC): ICD-10-CM

## 2023-01-13 DIAGNOSIS — K57.20 PERFORATION OF SIGMOID COLON DUE TO DIVERTICULITIS: ICD-10-CM

## 2023-01-13 DIAGNOSIS — I10 PRIMARY HYPERTENSION: ICD-10-CM

## 2023-01-13 DIAGNOSIS — G47.33 OSA (OBSTRUCTIVE SLEEP APNEA): ICD-10-CM

## 2023-01-13 DIAGNOSIS — R06.2 WHEEZING: ICD-10-CM

## 2023-01-13 DIAGNOSIS — R49.0 DYSPHONIA: ICD-10-CM

## 2023-01-13 DIAGNOSIS — E66.01 MORBID OBESITY (HCC): ICD-10-CM

## 2023-01-13 DIAGNOSIS — J44.1 COPD EXACERBATION (HCC): Primary | ICD-10-CM

## 2023-01-13 DIAGNOSIS — E66.9 OBESITY (BMI 30-39.9): ICD-10-CM

## 2023-01-13 DIAGNOSIS — R06.09 DYSPNEA ON EXERTION: ICD-10-CM

## 2023-01-13 DIAGNOSIS — Z99.2 END-STAGE RENAL DISEASE ON HEMODIALYSIS (HCC): ICD-10-CM

## 2023-01-13 DIAGNOSIS — K66.8 PNEUMOPERITONEUM: ICD-10-CM

## 2023-01-13 PROBLEM — R00.0 SINUS TACHYCARDIA: Status: ACTIVE | Noted: 2023-01-13

## 2023-01-13 PROBLEM — Z79.4 TYPE 2 DIABETES MELLITUS WITH HYPERGLYCEMIA, WITH LONG-TERM CURRENT USE OF INSULIN (HCC): Status: ACTIVE | Noted: 2018-07-12

## 2023-01-13 PROBLEM — E11.65 TYPE 2 DIABETES MELLITUS WITH HYPERGLYCEMIA, WITH LONG-TERM CURRENT USE OF INSULIN (HCC): Status: ACTIVE | Noted: 2018-07-12

## 2023-01-13 PROBLEM — D63.1 ANEMIA DUE TO CHRONIC KIDNEY DISEASE, ON CHRONIC DIALYSIS (HCC): Status: ACTIVE | Noted: 2023-01-13

## 2023-01-13 PROBLEM — I50.32 CHRONIC HEART FAILURE WITH PRESERVED EJECTION FRACTION (HCC): Status: ACTIVE | Noted: 2023-01-13

## 2023-01-13 LAB
ANION GAP SERPL CALC-SCNC: 9 MMOL/L (ref 3–18)
ATRIAL RATE: 98 BPM
B PERT DNA SPEC QL NAA+PROBE: NOT DETECTED
BASOPHILS # BLD: 0 K/UL (ref 0–0.1)
BASOPHILS NFR BLD: 0 % (ref 0–2)
BORDETELLA PARAPERTUSSIS PCR, BORPAR: NOT DETECTED
BUN SERPL-MCNC: 23 MG/DL (ref 7–18)
BUN/CREAT SERPL: 15 (ref 12–20)
C PNEUM DNA SPEC QL NAA+PROBE: NOT DETECTED
CALCIUM SERPL-MCNC: 9.6 MG/DL (ref 8.5–10.1)
CALCULATED P AXIS, ECG09: 74 DEGREES
CALCULATED R AXIS, ECG10: 20 DEGREES
CALCULATED T AXIS, ECG11: 86 DEGREES
CHLORIDE SERPL-SCNC: 104 MMOL/L (ref 100–111)
CO2 SERPL-SCNC: 26 MMOL/L (ref 21–32)
CREAT SERPL-MCNC: 1.58 MG/DL (ref 0.6–1.3)
DIAGNOSIS, 93000: NORMAL
DIFFERENTIAL METHOD BLD: ABNORMAL
EOSINOPHIL # BLD: 0 K/UL (ref 0–0.4)
EOSINOPHIL NFR BLD: 1 % (ref 0–5)
ERYTHROCYTE [DISTWIDTH] IN BLOOD BY AUTOMATED COUNT: 18.6 % (ref 11.6–14.5)
FLUAV H1 2009 PAND RNA SPEC QL NAA+PROBE: NOT DETECTED
FLUAV H1 RNA SPEC QL NAA+PROBE: NOT DETECTED
FLUAV H3 RNA SPEC QL NAA+PROBE: NOT DETECTED
FLUAV SUBTYP SPEC NAA+PROBE: NOT DETECTED
FLUBV RNA SPEC QL NAA+PROBE: NOT DETECTED
GLUCOSE BLD STRIP.AUTO-MCNC: 342 MG/DL (ref 70–110)
GLUCOSE SERPL-MCNC: 153 MG/DL (ref 74–99)
HADV DNA SPEC QL NAA+PROBE: NOT DETECTED
HCOV 229E RNA SPEC QL NAA+PROBE: NOT DETECTED
HCOV HKU1 RNA SPEC QL NAA+PROBE: NOT DETECTED
HCOV NL63 RNA SPEC QL NAA+PROBE: NOT DETECTED
HCOV OC43 RNA SPEC QL NAA+PROBE: NOT DETECTED
HCT VFR BLD AUTO: 35 % (ref 35–45)
HGB BLD-MCNC: 10.8 G/DL (ref 12–16)
HMPV RNA SPEC QL NAA+PROBE: NOT DETECTED
HPIV1 RNA SPEC QL NAA+PROBE: NOT DETECTED
HPIV2 RNA SPEC QL NAA+PROBE: NOT DETECTED
HPIV3 RNA SPEC QL NAA+PROBE: NOT DETECTED
HPIV4 RNA SPEC QL NAA+PROBE: NOT DETECTED
IMM GRANULOCYTES # BLD AUTO: 0 K/UL (ref 0–0.04)
IMM GRANULOCYTES NFR BLD AUTO: 1 % (ref 0–0.5)
LYMPHOCYTES # BLD: 1.2 K/UL (ref 0.9–3.6)
LYMPHOCYTES NFR BLD: 19 % (ref 21–52)
M PNEUMO DNA SPEC QL NAA+PROBE: NOT DETECTED
MCH RBC QN AUTO: 28.3 PG (ref 24–34)
MCHC RBC AUTO-ENTMCNC: 30.9 G/DL (ref 31–37)
MCV RBC AUTO: 91.6 FL (ref 78–100)
MONOCYTES # BLD: 0.6 K/UL (ref 0.05–1.2)
MONOCYTES NFR BLD: 10 % (ref 3–10)
NEUTS SEG # BLD: 4.2 K/UL (ref 1.8–8)
NEUTS SEG NFR BLD: 69 % (ref 40–73)
NRBC # BLD: 0.05 K/UL (ref 0–0.01)
NRBC BLD-RTO: 0.8 PER 100 WBC
P-R INTERVAL, ECG05: 160 MS
PLATELET # BLD AUTO: 292 K/UL (ref 135–420)
PMV BLD AUTO: 10 FL (ref 9.2–11.8)
POTASSIUM SERPL-SCNC: 4.6 MMOL/L (ref 3.5–5.5)
Q-T INTERVAL, ECG07: 360 MS
QRS DURATION, ECG06: 78 MS
QTC CALCULATION (BEZET), ECG08: 459 MS
RBC # BLD AUTO: 3.82 M/UL (ref 4.2–5.3)
RSV RNA SPEC QL NAA+PROBE: NOT DETECTED
RV+EV RNA SPEC QL NAA+PROBE: NOT DETECTED
SARS-COV-2 PCR, COVPCR: NOT DETECTED
SODIUM SERPL-SCNC: 139 MMOL/L (ref 136–145)
TROPONIN-HIGH SENSITIVITY: 25 NG/L (ref 0–54)
TROPONIN-HIGH SENSITIVITY: 25 NG/L (ref 0–54)
VENTRICULAR RATE, ECG03: 98 BPM
WBC # BLD AUTO: 6.1 K/UL (ref 4.6–13.2)

## 2023-01-13 PROCEDURE — 74011250636 HC RX REV CODE- 250/636: Performed by: STUDENT IN AN ORGANIZED HEALTH CARE EDUCATION/TRAINING PROGRAM

## 2023-01-13 PROCEDURE — 74011250637 HC RX REV CODE- 250/637: Performed by: PHYSICIAN ASSISTANT

## 2023-01-13 PROCEDURE — 84484 ASSAY OF TROPONIN QUANT: CPT

## 2023-01-13 PROCEDURE — 74011636637 HC RX REV CODE- 636/637: Performed by: PHYSICIAN ASSISTANT

## 2023-01-13 PROCEDURE — 94640 AIRWAY INHALATION TREATMENT: CPT

## 2023-01-13 PROCEDURE — 96374 THER/PROPH/DIAG INJ IV PUSH: CPT

## 2023-01-13 PROCEDURE — 93005 ELECTROCARDIOGRAM TRACING: CPT

## 2023-01-13 PROCEDURE — 85025 COMPLETE CBC W/AUTO DIFF WBC: CPT

## 2023-01-13 PROCEDURE — 74011000250 HC RX REV CODE- 250: Performed by: PHYSICIAN ASSISTANT

## 2023-01-13 PROCEDURE — 99285 EMERGENCY DEPT VISIT HI MDM: CPT

## 2023-01-13 PROCEDURE — 74011250636 HC RX REV CODE- 250/636: Performed by: PHYSICIAN ASSISTANT

## 2023-01-13 PROCEDURE — 74011250636 HC RX REV CODE- 250/636: Performed by: HOSPITALIST

## 2023-01-13 PROCEDURE — 80048 BASIC METABOLIC PNL TOTAL CA: CPT

## 2023-01-13 PROCEDURE — 74011250637 HC RX REV CODE- 250/637: Performed by: HOSPITALIST

## 2023-01-13 PROCEDURE — 94762 N-INVAS EAR/PLS OXIMTRY CONT: CPT

## 2023-01-13 PROCEDURE — 74011000250 HC RX REV CODE- 250: Performed by: STUDENT IN AN ORGANIZED HEALTH CARE EDUCATION/TRAINING PROGRAM

## 2023-01-13 PROCEDURE — 74011250637 HC RX REV CODE- 250/637: Performed by: STUDENT IN AN ORGANIZED HEALTH CARE EDUCATION/TRAINING PROGRAM

## 2023-01-13 PROCEDURE — 82962 GLUCOSE BLOOD TEST: CPT

## 2023-01-13 PROCEDURE — 71045 X-RAY EXAM CHEST 1 VIEW: CPT

## 2023-01-13 PROCEDURE — APPSS60 APP SPLIT SHARED TIME 46-60 MINUTES: Performed by: PHYSICIAN ASSISTANT

## 2023-01-13 PROCEDURE — 65270000046 HC RM TELEMETRY

## 2023-01-13 PROCEDURE — 96361 HYDRATE IV INFUSION ADD-ON: CPT

## 2023-01-13 PROCEDURE — 96375 TX/PRO/DX INJ NEW DRUG ADDON: CPT

## 2023-01-13 PROCEDURE — 0202U NFCT DS 22 TRGT SARS-COV-2: CPT

## 2023-01-13 RX ORDER — NITROGLYCERIN 0.4 MG/1
0.4 TABLET SUBLINGUAL
Status: COMPLETED | OUTPATIENT
Start: 2023-01-13 | End: 2023-01-13

## 2023-01-13 RX ORDER — IPRATROPIUM BROMIDE 0.5 MG/2.5ML
0.5 SOLUTION RESPIRATORY (INHALATION)
Status: COMPLETED | OUTPATIENT
Start: 2023-01-13 | End: 2023-01-13

## 2023-01-13 RX ORDER — BUDESONIDE 0.5 MG/2ML
500 INHALANT ORAL
Status: DISCONTINUED | OUTPATIENT
Start: 2023-01-13 | End: 2023-01-20

## 2023-01-13 RX ORDER — GUAIFENESIN 600 MG/1
600 TABLET, EXTENDED RELEASE ORAL EVERY 12 HOURS
Status: DISCONTINUED | OUTPATIENT
Start: 2023-01-13 | End: 2023-01-25 | Stop reason: HOSPADM

## 2023-01-13 RX ORDER — FACIAL-BODY WIPES
10 EACH TOPICAL DAILY PRN
Status: DISCONTINUED | OUTPATIENT
Start: 2023-01-13 | End: 2023-01-25 | Stop reason: HOSPADM

## 2023-01-13 RX ORDER — DEXTROSE MONOHYDRATE 100 MG/ML
0-250 INJECTION, SOLUTION INTRAVENOUS AS NEEDED
Status: DISCONTINUED | OUTPATIENT
Start: 2023-01-13 | End: 2023-01-25 | Stop reason: HOSPADM

## 2023-01-13 RX ORDER — METOPROLOL TARTRATE 25 MG/1
25 TABLET, FILM COATED ORAL 2 TIMES DAILY
Status: DISCONTINUED | OUTPATIENT
Start: 2023-01-13 | End: 2023-01-25 | Stop reason: HOSPADM

## 2023-01-13 RX ORDER — IPRATROPIUM BROMIDE AND ALBUTEROL SULFATE 2.5; .5 MG/3ML; MG/3ML
3 SOLUTION RESPIRATORY (INHALATION)
Status: DISCONTINUED | OUTPATIENT
Start: 2023-01-13 | End: 2023-01-14

## 2023-01-13 RX ORDER — ACETAMINOPHEN 650 MG/1
650 SUPPOSITORY RECTAL
Status: DISCONTINUED | OUTPATIENT
Start: 2023-01-13 | End: 2023-01-25 | Stop reason: HOSPADM

## 2023-01-13 RX ORDER — GABAPENTIN 400 MG/1
400 CAPSULE ORAL DAILY
Status: DISCONTINUED | OUTPATIENT
Start: 2023-01-14 | End: 2023-01-25 | Stop reason: HOSPADM

## 2023-01-13 RX ORDER — INSULIN GLARGINE 100 [IU]/ML
5 INJECTION, SOLUTION SUBCUTANEOUS
Status: DISCONTINUED | OUTPATIENT
Start: 2023-01-13 | End: 2023-01-25 | Stop reason: HOSPADM

## 2023-01-13 RX ORDER — GUAIFENESIN 100 MG/5ML
162 LIQUID (ML) ORAL DAILY
Status: DISCONTINUED | OUTPATIENT
Start: 2023-01-14 | End: 2023-01-25 | Stop reason: HOSPADM

## 2023-01-13 RX ORDER — SODIUM CHLORIDE 0.9 % (FLUSH) 0.9 %
5-40 SYRINGE (ML) INJECTION AS NEEDED
Status: DISCONTINUED | OUTPATIENT
Start: 2023-01-13 | End: 2023-01-25 | Stop reason: HOSPADM

## 2023-01-13 RX ORDER — IPRATROPIUM BROMIDE AND ALBUTEROL SULFATE 2.5; .5 MG/3ML; MG/3ML
3 SOLUTION RESPIRATORY (INHALATION)
Status: COMPLETED | OUTPATIENT
Start: 2023-01-13 | End: 2023-01-13

## 2023-01-13 RX ORDER — ENOXAPARIN SODIUM 100 MG/ML
40 INJECTION SUBCUTANEOUS DAILY
Status: DISCONTINUED | OUTPATIENT
Start: 2023-01-14 | End: 2023-01-14 | Stop reason: ALTCHOICE

## 2023-01-13 RX ORDER — METOPROLOL TARTRATE 5 MG/5ML
2.5 INJECTION INTRAVENOUS
Status: COMPLETED | OUTPATIENT
Start: 2023-01-13 | End: 2023-01-13

## 2023-01-13 RX ORDER — ONDANSETRON 2 MG/ML
4 INJECTION INTRAMUSCULAR; INTRAVENOUS
Status: DISCONTINUED | OUTPATIENT
Start: 2023-01-13 | End: 2023-01-25 | Stop reason: HOSPADM

## 2023-01-13 RX ORDER — EZETIMIBE 10 MG/1
10 TABLET ORAL DAILY
COMMUNITY
Start: 2023-01-09

## 2023-01-13 RX ORDER — INSULIN LISPRO 100 [IU]/ML
INJECTION, SOLUTION INTRAVENOUS; SUBCUTANEOUS
Status: DISCONTINUED | OUTPATIENT
Start: 2023-01-13 | End: 2023-01-25 | Stop reason: HOSPADM

## 2023-01-13 RX ORDER — IPRATROPIUM BROMIDE AND ALBUTEROL SULFATE 2.5; .5 MG/3ML; MG/3ML
3 SOLUTION RESPIRATORY (INHALATION)
Status: DISCONTINUED | OUTPATIENT
Start: 2023-01-13 | End: 2023-01-13 | Stop reason: CLARIF

## 2023-01-13 RX ORDER — ATORVASTATIN CALCIUM 40 MG/1
80 TABLET, FILM COATED ORAL
Status: DISCONTINUED | OUTPATIENT
Start: 2023-01-13 | End: 2023-01-25 | Stop reason: HOSPADM

## 2023-01-13 RX ORDER — ATORVASTATIN CALCIUM 80 MG/1
80 TABLET, FILM COATED ORAL
COMMUNITY
Start: 2023-01-09

## 2023-01-13 RX ORDER — IPRATROPIUM BROMIDE 0.5 MG/2.5ML
0.5 SOLUTION RESPIRATORY (INHALATION)
Status: DISCONTINUED | OUTPATIENT
Start: 2023-01-13 | End: 2023-01-13

## 2023-01-13 RX ORDER — ACETAMINOPHEN 325 MG/1
650 TABLET ORAL
Status: DISCONTINUED | OUTPATIENT
Start: 2023-01-13 | End: 2023-01-25 | Stop reason: HOSPADM

## 2023-01-13 RX ORDER — ONDANSETRON 4 MG/1
4 TABLET, ORALLY DISINTEGRATING ORAL
Status: DISCONTINUED | OUTPATIENT
Start: 2023-01-13 | End: 2023-01-25 | Stop reason: HOSPADM

## 2023-01-13 RX ORDER — POLYETHYLENE GLYCOL 3350 17 G/17G
17 POWDER, FOR SOLUTION ORAL DAILY PRN
Status: DISCONTINUED | OUTPATIENT
Start: 2023-01-13 | End: 2023-01-25 | Stop reason: HOSPADM

## 2023-01-13 RX ORDER — ALBUTEROL SULFATE 0.83 MG/ML
2.5 SOLUTION RESPIRATORY (INHALATION)
Status: COMPLETED | OUTPATIENT
Start: 2023-01-13 | End: 2023-01-13

## 2023-01-13 RX ORDER — CARVEDILOL 25 MG/1
TABLET ORAL
COMMUNITY
Start: 2023-01-09 | End: 2023-01-13

## 2023-01-13 RX ORDER — ARFORMOTEROL TARTRATE 15 UG/2ML
15 SOLUTION RESPIRATORY (INHALATION)
Status: DISCONTINUED | OUTPATIENT
Start: 2023-01-13 | End: 2023-01-20

## 2023-01-13 RX ORDER — ALBUTEROL SULFATE 90 UG/1
AEROSOL, METERED RESPIRATORY (INHALATION)
COMMUNITY
Start: 2023-01-09 | End: 2023-02-03 | Stop reason: SDUPTHER

## 2023-01-13 RX ORDER — EZETIMIBE 10 MG/1
10 TABLET ORAL DAILY
Status: DISCONTINUED | OUTPATIENT
Start: 2023-01-14 | End: 2023-01-25 | Stop reason: HOSPADM

## 2023-01-13 RX ORDER — IBUPROFEN 200 MG
4 TABLET ORAL AS NEEDED
Status: DISCONTINUED | OUTPATIENT
Start: 2023-01-13 | End: 2023-01-25 | Stop reason: HOSPADM

## 2023-01-13 RX ORDER — SODIUM CHLORIDE 0.9 % (FLUSH) 0.9 %
5-40 SYRINGE (ML) INJECTION EVERY 8 HOURS
Status: DISCONTINUED | OUTPATIENT
Start: 2023-01-13 | End: 2023-01-25 | Stop reason: HOSPADM

## 2023-01-13 RX ORDER — BLOOD SUGAR DIAGNOSTIC
STRIP MISCELLANEOUS
COMMUNITY
Start: 2023-01-09

## 2023-01-13 RX ADMIN — IPRATROPIUM BROMIDE AND ALBUTEROL SULFATE 3 ML: .5; 3 SOLUTION RESPIRATORY (INHALATION) at 10:40

## 2023-01-13 RX ADMIN — Medication 0.4 MG: at 14:27

## 2023-01-13 RX ADMIN — BUDESONIDE 500 MCG: 0.5 INHALANT RESPIRATORY (INHALATION) at 21:03

## 2023-01-13 RX ADMIN — METHYLPREDNISOLONE SODIUM SUCCINATE 125 MG: 125 INJECTION, POWDER, FOR SOLUTION INTRAMUSCULAR; INTRAVENOUS at 10:41

## 2023-01-13 RX ADMIN — SODIUM CHLORIDE, PRESERVATIVE FREE 10 ML: 5 INJECTION INTRAVENOUS at 21:09

## 2023-01-13 RX ADMIN — METOPROLOL TARTRATE 2.5 MG: 5 INJECTION, SOLUTION INTRAVENOUS at 14:39

## 2023-01-13 RX ADMIN — IPRATROPIUM BROMIDE AND ALBUTEROL SULFATE 3 ML: .5; 3 SOLUTION RESPIRATORY (INHALATION) at 21:03

## 2023-01-13 RX ADMIN — IPRATROPIUM BROMIDE 0.5 MG: 0.5 SOLUTION RESPIRATORY (INHALATION) at 11:37

## 2023-01-13 RX ADMIN — ALBUTEROL SULFATE 2.5 MG: 2.5 SOLUTION RESPIRATORY (INHALATION) at 11:37

## 2023-01-13 RX ADMIN — NITROGLYCERIN 0.4 MG: 0.4 TABLET, ORALLY DISINTEGRATING SUBLINGUAL at 12:52

## 2023-01-13 RX ADMIN — AZITHROMYCIN MONOHYDRATE 500 MG: 500 INJECTION, POWDER, LYOPHILIZED, FOR SOLUTION INTRAVENOUS at 17:44

## 2023-01-13 RX ADMIN — IPRATROPIUM BROMIDE AND ALBUTEROL SULFATE 3 ML: .5; 3 SOLUTION RESPIRATORY (INHALATION) at 17:46

## 2023-01-13 RX ADMIN — SODIUM CHLORIDE 500 ML: 9 INJECTION, SOLUTION INTRAVENOUS at 14:47

## 2023-01-13 RX ADMIN — METOPROLOL TARTRATE 25 MG: 25 TABLET, FILM COATED ORAL at 17:46

## 2023-01-13 RX ADMIN — METHYLPREDNISOLONE SODIUM SUCCINATE 40 MG: 40 INJECTION, POWDER, FOR SOLUTION INTRAMUSCULAR; INTRAVENOUS at 17:43

## 2023-01-13 RX ADMIN — IPRATROPIUM BROMIDE AND ALBUTEROL SULFATE 3 ML: .5; 2.5 SOLUTION RESPIRATORY (INHALATION) at 11:37

## 2023-01-13 RX ADMIN — Medication 8 UNITS: at 21:29

## 2023-01-13 RX ADMIN — Medication 5 UNITS: at 21:29

## 2023-01-13 RX ADMIN — METHYLPREDNISOLONE SODIUM SUCCINATE 80 MG: 40 INJECTION, POWDER, FOR SOLUTION INTRAMUSCULAR; INTRAVENOUS at 21:03

## 2023-01-13 RX ADMIN — ARFORMOTEROL TARTRATE 15 MCG: 15 SOLUTION RESPIRATORY (INHALATION) at 21:03

## 2023-01-13 RX ADMIN — GUAIFENESIN 600 MG: 600 TABLET, EXTENDED RELEASE ORAL at 21:04

## 2023-01-13 RX ADMIN — NITROGLYCERIN 0.4 MG: 0.4 TABLET, ORALLY DISINTEGRATING SUBLINGUAL at 13:02

## 2023-01-13 RX ADMIN — Medication 0.4 MG: at 14:37

## 2023-01-13 RX ADMIN — ATORVASTATIN CALCIUM 80 MG: 40 TABLET, FILM COATED ORAL at 21:28

## 2023-01-13 NOTE — PROGRESS NOTES
Ipratropium + Arformoterol nebulizer was therapeutically interchanged for Stiolto Respimat MDI per the P &T Committee approved Therapeutic Interchanges Policy.

## 2023-01-13 NOTE — Clinical Note
Status[de-identified] INPATIENT [101]   Type of Bed: Telemetry [19]   Cardiac Monitoring Required?: Yes   Inpatient Hospitalization Certified Necessary for the Following Reasons: 3.  Patient receiving treatment that can only be provided in an inpatient setting (further clarification in H&P documentation)   Admitting Diagnosis: COPD exacerbation [facility]) [[id number]]   Admitting Physician: Wendi Hammonds [[id number]]   Attending Physician: Wendi Hammonds [[id number]]   Estimated Length of Stay: 2 Midnights   Discharge Plan[de-identified] Home with Office Follow-up

## 2023-01-13 NOTE — H&P
History and Physical          Subjective     HPI: Marcus Wood is a 81 y.o. female with a PMHx of ESRD on HD, COPD not on home O2, CAD, DM, HTN, HFpEF who presented to the ED with c/o severe SOB that started yesterday and is associated with wheezing. No improvement with at home breathing treatments. SOB is worse with lying flat and exertion. She states this feels similar to her prior COPD exacerbations rather than prior HF exacerbations. She denies sick contacts. She does endorse sore throat and cough, but states it only started after all the breathing treatments in the waiting room. She denies fever, abd pain, nvd, leg swelling. She did report chest pain due to severe SOB, but states it has resolved. In the ED, BP initially elevated, but dropped after SL nitro. HR now elevated s/p multiple breathing treatments. Labs with stable hgb, trop 25 -> 25, , BUN 23, Cr 1.58. CXR negative. PMHx:  Past Medical History:   Diagnosis Date    Abnormal WBC count 05/17/2016    Anemia     Bilateral shoulder pain 09/27/2016    Chondromalacia of both patellae     COPD (chronic obstructive pulmonary disease) ([facility] 09/2015    mild-mod dz; Dr Sander Castro    Diabetes [facility]) 2013    Diabetic eye exam ([facility] 2016    Dilated cardiomyopathy ([facility]     Dyslipidemia     Gout     Heart attack ([facility] [facility] 10/18/2019    Stented    History of echocardiogram 11/14/2014    Mild LVE. EF 40%. Mild, diffuse hypk. Mild LAE. Mild MR.       Hypercholesteremia 01/08/2014    Hypertension 2000    Noncompliance with medications 02/16/2016    Obesity     Orthostatic hypotension 05/17/2016    Osteoarthritis of both knees     Pain management 04/01/2016    Dr. Finn Huge     Popliteal cyst, bilateral      Sleep apnea     not using cpap    Stage 3b chronic kidney disease ([facility] [facility] 08/08/2022    Vitamin D deficiency 10/16/2014       PSurgHx:  Past Surgical History:   Procedure Laterality Date    HX HEART CATHETERIZATION  2019    Coronary stent    HX TUBAL LIGATION IR INSERT TWILA CVC W/O PORT OVER 5 YR  09/21/2022       SocialHx:  Social History     Socioeconomic History    Marital status:     Number of children: 3   Occupational History    Occupation: retired   Tobacco Use    Smoking status: Former     Packs/day: 1.00     Years: 52.00     Pack years: 52.00     Types: Cigarettes     Quit date: 5/20/2019     Years since quitting: 3.6    Smokeless tobacco: Never   Vaping Use    Vaping Use: Never used   Substance and Sexual Activity    Alcohol use: No     Alcohol/week: 0.0 standard drinks    Drug use: Never    Sexual activity: Yes     Partners: Male   Other Topics Concern     Service No    Blood Transfusions No    Caffeine Concern No    Occupational Exposure No    Hobby Hazards No    Sleep Concern No    Stress Concern No    Weight Concern No    Special Diet No    Back Care No    Exercise No    Bike Helmet No    Seat Belt Yes    Self-Exams Yes       FamilyHx:  Family History   Problem Relation Age of Onset    Diabetes Mother     Hypertension Mother     Hypertension Father     Kidney Disease Maternal Aunt 50        Dialysis       Home Medications:  Prior to Admission Medications   Prescriptions Last Dose Informant Taking? OneTouch Ultra Test strip   No   Sig: USE TO TEST BLOOD SUGAR TWICE DAILY   albuterol (PROVENTIL HFA, VENTOLIN HFA, PROAIR HFA) 90 mcg/actuation inhaler   No   albuterol-ipratropium (DUO-NEB) 2.5 mg-0.5 mg/3 ml nebu   No   Sig: 3 mL by Nebulization route every six (6) hours as needed for Wheezing. aspirin 81 mg chewable tablet   No   Sig: Take 2 Tabs by mouth daily. atorvastatin (LIPITOR) 80 mg tablet   No   Sig: Take 80 mg by mouth nightly.   ezetimibe (ZETIA) 10 mg tablet   No   Sig: Take 10 mg by mouth daily. gabapentin (NEURONTIN) 400 mg capsule   No   Sig: Take 400 mg by mouth in the morning. hydrALAZINE (APRESOLINE) 100 mg tablet   No   Sig: Take 100 mg by mouth two (2) times a day.    insulin detemir U-100 (Levemir U-100 Insulin) 100 unit/mL injection   No   Sig: 15 units daily for diabetes once daily with lunch   metoprolol tartrate (LOPRESSOR) 25 mg tablet   No   Sig: Take 1 Tablet by mouth two (2) times a day. tiZANidine (ZANAFLEX) 4 mg tablet   No   Sig: Take 4 mg by mouth two (2) times daily as needed. umeclidinium-vilanterol (ANORO ELLIPTA) 62.5-25 mcg/actuation inhaler   No   Sig: Take 1 Puff by inhalation daily. For COPD      Facility-Administered Medications: None       Allergies:  No Known Allergies     Review of Systems:  CONST: no weight loss/gain, no fever or chills, no fatigue  Eyes: No change in vision, no itching or drainage  ENT: No earache, no tinnitus, + sore throat no sinus congestion. PULM: + shortness of breath, + cough + wheeze. CV: no pnd + orthopnea, no CP, no palpitations, no edema  GI: No abdominal pain, no nausea, no vomiting or diarrhea  : No urinary frequency, no urgency, no hesitancy or dysuria. MSK: No joint or muscle pain, no back pain, no neck pain  INTEG: No rash, no itching, no lesions. ENDO: No polyuria, no polydipsia  HEME: No anemia or easy bruising or bleeding.    NEURO: No headache, no dizziness  PSYCH: No anxiety, no depression      Objective     Physical Exam:  Visit Vitals  /60   Pulse (!) 116   Temp 98.5 °F (36.9 °C)   Resp 22   Ht 5' 4\" (1.626 m)   Wt 96.6 kg (213 lb)   SpO2 95%   BMI 36.56 kg/m²       General: NAD, appears stated age, alert  Skin: warm, dry, no rashes  Eyes: PERRL, sclera is non-icteric  HENT: normocephalic/atraumatic, moist mucus membranes  Respiratory: decreased air movement, diffuse tight wheezing, conversational dyspnea  Cardiovascular: tachy, reg rhythm, no m/r/g, no cyanosis or peripheral edema of extremities  GI: soft, non-tender, normal bowel sounds  Neuro: moves all extremities, no focal deficits, normal speech  Psych: appropriate mood and affect, no visual or auditory hallucinations    Laboratory Studies:  Recent Results (from the past 24 hour(s))   EKG, 12 LEAD, INITIAL    Collection Time: 01/13/23  9:42 AM   Result Value Ref Range    Ventricular Rate 98 BPM    Atrial Rate 98 BPM    P-R Interval 160 ms    QRS Duration 78 ms    Q-T Interval 360 ms    QTC Calculation (Bezet) 459 ms    Calculated P Axis 74 degrees    Calculated R Axis 20 degrees    Calculated T Axis 86 degrees    Diagnosis       Normal sinus rhythm  Possible Left atrial enlargement  Minimal voltage criteria for LVH, may be normal variant ( [place] product )  Borderline ECG  When compared with ECG of 08-DEC-2022 12:19,  No significant change was found     CBC WITH AUTOMATED DIFF    Collection Time: 01/13/23  9:55 AM   Result Value Ref Range    WBC 6.1 4.6 - 13.2 K/uL    RBC 3.82 (L) 4.20 - 5.30 M/uL    HGB 10.8 (L) 12.0 - 16.0 g/dL    HCT 35.0 35.0 - 45.0 %    MCV 91.6 78.0 - 100.0 FL    MCH 28.3 24.0 - 34.0 PG    MCHC 30.9 (L) 31.0 - 37.0 g/dL    RDW 18.6 (H) 11.6 - 14.5 %    PLATELET 812 251 - 962 K/uL    MPV 10.0 9.2 - 11.8 FL    NRBC 0.8 (H) 0  WBC    ABSOLUTE NRBC 0.05 (H) 0.00 - 0.01 K/uL    NEUTROPHILS 69 40 - 73 %    LYMPHOCYTES 19 (L) 21 - 52 %    MONOCYTES 10 3 - 10 %    EOSINOPHILS 1 0 - 5 %    BASOPHILS 0 0 - 2 %    IMMATURE GRANULOCYTES 1 (H) 0.0 - 0.5 %    ABS. NEUTROPHILS 4.2 1.8 - 8.0 K/UL    ABS. LYMPHOCYTES 1.2 0.9 - 3.6 K/UL    ABS. MONOCYTES 0.6 0.05 - 1.2 K/UL    ABS. EOSINOPHILS 0.0 0.0 - 0.4 K/UL    ABS. BASOPHILS 0.0 0.0 - 0.1 K/UL    ABS. IMM.  GRANS. 0.0 0.00 - 0.04 K/UL    DF AUTOMATED     METABOLIC PANEL, BASIC    Collection Time: 01/13/23  9:55 AM   Result Value Ref Range    Sodium 139 136 - 145 mmol/L    Potassium 4.6 3.5 - 5.5 mmol/L    Chloride 104 100 - 111 mmol/L    CO2 26 21 - 32 mmol/L    Anion gap 9 3.0 - 18 mmol/L    Glucose 153 (H) 74 - 99 mg/dL    BUN 23 (H) 7.0 - 18 MG/DL    Creatinine 1.58 (H) 0.6 - 1.3 MG/DL    BUN/Creatinine ratio 15 12 - 20      eGFR 35 (L) >60 ml/min/1.73m2    Calcium 9.6 8.5 - 10.1 MG/DL   TROPONIN-HIGH SENSITIVITY Collection Time: 01/13/23  9:55 AM   Result Value Ref Range    Troponin-High Sensitivity 25 0 - 54 ng/L   TROPONIN-HIGH SENSITIVITY    Collection Time: 01/13/23  2:35 PM   Result Value Ref Range    Troponin-High Sensitivity 25 0 - 54 ng/L       Imaging Reviewed:  XR CHEST PORT    Result Date: 1/13/2023  EXAM: XR CHEST PORT Indications: sob Comparison: December 2022 Findings: Rotated to the right Lines/Tubes/Devices:  Right IJ dialysis catheter unchanged with tip in the upper right atrium. LUNGS: Clear MEDIASTINUM: Unremarkable BONES/SOFT TISSUES: No acute findings     : 1. No acute cardiopulmonary disease.          Assessment/Plan     Hospital Problems  Date Reviewed: 1/9/2023            Codes Class Noted POA    Sinus tachycardia ICD-10-CM: R00.0  ICD-9-CM: 427.89  1/13/2023 Yes        Chronic heart failure with preserved ejection fraction [facility]) ICD-10-CM: I50.32  ICD-9-CM: 428.9  1/13/2023 Yes        Anemia due to chronic kidney disease, on chronic dialysis ([facility] ICD-10-CM: N18.6, D63.1, Z99.2  ICD-9-CM: 585.6, 285.21, V45.11  1/13/2023 Yes        ESRD (end stage renal disease) on dialysis [facility]) ICD-10-CM: N18.6, Z99.2  ICD-9-CM: 585.6, V45.11  12/8/2022 Yes        * (Principal) COPD with acute exacerbation ([facility] ICD-10-CM: J44.1  ICD-9-CM: 491.21  10/19/2019 Yes        Type 2 diabetes mellitus with hyperglycemia, with long-term current use of insulin (HCC) ICD-10-CM: E11.65, Z79.4  ICD-9-CM: 250.00, 790.29, V58.67  7/12/2018 Yes        Coronary artery disease involving native coronary artery of native heart without angina pectoris ICD-10-CM: I25.10  ICD-9-CM: 414.01  5/31/2018 Yes        Essential hypertension (Chronic) ICD-10-CM: I10  ICD-9-CM: 401.9  9/17/2015 Yes           COPD with AE  - IV steroids, q4 hour duonebds, azithro  - monitor for O2 requirement, currently 100% on room air  - check RVP    ESRD on dialysis, Chronic HFpEF  - volume mgmt per nephrology, appreciate assistance  - monitor renal function panel  - renal diet    DM with hyperglycemia  - lantus, ssi, monitor achs    HTN, Sinus tach  - Resume BB, hold hydralazine for now    CAD, HLD  - cont asa, statin, zetia      Anticipated Discharge: 2 days    DVT Prophylaxis:  [x]Lovenox  []Hep SQ  []SCDs  []Coumadin []DOAC  []On Heparin gtt     I have personally reviewed all pertinent labs, films and EKGs that have officially resulted. I reviewed available electronic documentation outlining the initial presentation as well as the emergency room physician's encounter.    Time spent reviewing records, independently interpreting results, obtaining history from patient or caregiver, performing physical exam, ordering tests and medications, documenting in the chart, and coordinating overall care is 55 minutes    Rubbie Klinefelter, PA-C  [facility]  Office:  [phone]  Pager: [phone]

## 2023-01-13 NOTE — ED NOTES
Pt ambulated to the bathroom.  Pt tolerated but did have increased work of breathing with ambulation

## 2023-01-13 NOTE — ED PROVIDER NOTES
EMERGENCY DEPARTMENT HISTORY AND PHYSICAL EXAM      Date: 1/13/2023  Patient Name: Marianna Wen    History of Presenting Illness     Chief Complaint   Patient presents with    Shortness of Breath         History Provided By: Patient    Patient is a 42-year-old female with history of ESRD on hemodialysis (TTS), diabetes, hypertension, gout, dyslipidemia, COPD addition to medical history as listed below presenting to the emergency department for evaluation of shortness of breath. Patient reports feeling short of breath since last night. No relief from her home nebulizer treatments. She did receive her full dialysis treatment yesterday without any complications. She denies any fevers or chills, chest pain, cough, abdominal pain, NVD      PCP: Ghulam Mistry MD    Current Outpatient Medications   Medication Sig Dispense Refill    albuterol (PROVENTIL HFA, VENTOLIN HFA, PROAIR HFA) 90 mcg/actuation inhaler       atorvastatin (LIPITOR) 80 mg tablet Take 80 mg by mouth nightly. OneTouch Ultra Test strip USE TO TEST BLOOD SUGAR TWICE DAILY      carvediloL (COREG) 25 mg tablet       ezetimibe (ZETIA) 10 mg tablet Take 10 mg by mouth daily. hydrALAZINE (APRESOLINE) 100 mg tablet Take 100 mg by mouth two (2) times a day. tiZANidine (ZANAFLEX) 4 mg tablet Take 4 mg by mouth two (2) times daily as needed. metoprolol tartrate (LOPRESSOR) 25 mg tablet Take 1 Tablet by mouth two (2) times a day. 60 Tablet 0    insulin detemir U-100 (Levemir U-100 Insulin) 100 unit/mL injection 15 units daily for diabetes once daily with lunch 2 mL 0    gabapentin (NEURONTIN) 400 mg capsule Take 400 mg by mouth in the morning. albuterol-ipratropium (DUO-NEB) 2.5 mg-0.5 mg/3 ml nebu 3 mL by Nebulization route every six (6) hours as needed for Wheezing. 30 Nebule 0    aspirin 81 mg chewable tablet Take 2 Tabs by mouth daily.  60 Tab 0    umeclidinium-vilanterol (ANORO ELLIPTA) 62.5-25 mcg/actuation inhaler Take 1 Puff by inhalation daily. For COPD 3 Inhaler 3       Past History     Past Medical History:  Past Medical History:   Diagnosis Date    Abnormal WBC count 05/17/2016    Anemia     Bilateral shoulder pain 09/27/2016    Chondromalacia of both patellae     COPD (chronic obstructive pulmonary disease) ([facility] [address] 09/2015    mild-mod dz; Dr Milvia Clayton    Diabetes [facility]) 2013    Diabetic eye exam ([facility] 2016    Dilated cardiomyopathy ([facility]     Dyslipidemia     Gout     Heart attack ([facility] 10/18/2019    Stented    History of echocardiogram 11/14/2014    Mild LVE. EF 40%. Mild, diffuse hypk. Mild LAE. Mild MR. Hypercholesteremia 01/08/2014    Hypertension 2000    Noncompliance with medications 02/16/2016    Obesity     Orthostatic hypotension 05/17/2016    Osteoarthritis of both knees     Pain management 04/01/2016    Dr. Dani Foy     Popliteal cyst, bilateral      Sleep apnea     not using cpap    Stage 3b chronic kidney disease ([facility] 08/08/2022    Vitamin D deficiency 10/16/2014       Past Surgical History:  Past Surgical History:   Procedure Laterality Date    HX HEART CATHETERIZATION  2019    Coronary stent    HX TUBAL LIGATION      IR INSERT TUNL CVC W/O PORT OVER 5 YR  09/21/2022       Family History:  Family History   Problem Relation Age of Onset    Diabetes Mother     Hypertension Mother     Hypertension Father     Kidney Disease Maternal Aunt 48        Dialysis       Social History:  Social History     Tobacco Use    Smoking status: Former     Packs/day: 1.00     Years: 52.00     Pack years: 52.00     Types: Cigarettes     Quit date: 5/20/2019     Years since quitting: 3.6    Smokeless tobacco: Never   Vaping Use    Vaping Use: Never used   Substance Use Topics    Alcohol use: No     Alcohol/week: 0.0 standard drinks    Drug use: Never       Allergies:  No Known Allergies      Review of Systems       Review of Systems   Constitutional:  Negative for activity change, chills, diaphoresis, fatigue and fever. HENT:  Negative for congestion, sore throat and voice change. Respiratory:  Positive for shortness of breath. Negative for cough, chest tightness, wheezing and stridor. Cardiovascular:  Negative for chest pain and palpitations. Gastrointestinal:  Negative for abdominal distention, abdominal pain, constipation, diarrhea, nausea and vomiting. Musculoskeletal:  Negative for back pain, joint swelling and myalgias. Skin:  Negative for rash and wound. Neurological:  Negative for dizziness, weakness and headaches. Psychiatric/Behavioral:  Negative for agitation. The patient is not nervous/anxious. Physical Exam   Visit Vitals  /60   Pulse (!) 116   Temp 98.5 °F (36.9 °C)   Resp 22   Ht 5' 4\" (1.626 m)   Wt 96.6 kg (213 lb)   SpO2 95%   BMI 36.56 kg/m²         Physical Exam  Constitutional:       General: She is not in acute distress. Appearance: She is not toxic-appearing. HENT:      Head: Normocephalic and atraumatic. Mouth/Throat:      Mouth: Mucous membranes are moist.   Cardiovascular:      Rate and Rhythm: Normal rate and regular rhythm. Heart sounds: Normal heart sounds. No murmur heard. No friction rub. No gallop. Pulmonary:      Effort: Pulmonary effort is normal.      Breath sounds: Examination of the right-upper field reveals wheezing. Examination of the left-upper field reveals wheezing. Examination of the right-middle field reveals wheezing. Examination of the left-middle field reveals wheezing. Examination of the right-lower field reveals wheezing. Examination of the left-lower field reveals wheezing. Wheezing present. Abdominal:      General: There is no distension. Palpations: Abdomen is soft. There is no mass. Tenderness: There is no abdominal tenderness. There is no guarding. Hernia: No hernia is present. Musculoskeletal:         General: No swelling, tenderness or deformity. Cervical back: Normal range of motion and neck supple. Skin:     General: Skin is warm and dry. Findings: No rash. Neurological:      General: No focal deficit present. Mental Status: She is alert and oriented to person, place, and time. Psychiatric:         Mood and Affect: Mood normal.         Diagnostic Study Results     Labs -  Recent Results (from the past 12 hour(s))   EKG, 12 LEAD, INITIAL    Collection Time: 01/13/23  9:42 AM   Result Value Ref Range    Ventricular Rate 98 BPM    Atrial Rate 98 BPM    P-R Interval 160 ms    QRS Duration 78 ms    Q-T Interval 360 ms    QTC Calculation (Bezet) 459 ms    Calculated P Axis 74 degrees    Calculated R Axis 20 degrees    Calculated T Axis 86 degrees    Diagnosis       Normal sinus rhythm  Possible Left atrial enlargement  Minimal voltage criteria for LVH, may be normal variant ( [place] product )  Borderline ECG  When compared with ECG of 08-DEC-2022 12:19,  No significant change was found     CBC WITH AUTOMATED DIFF    Collection Time: 01/13/23  9:55 AM   Result Value Ref Range    WBC 6.1 4.6 - 13.2 K/uL    RBC 3.82 (L) 4.20 - 5.30 M/uL    HGB 10.8 (L) 12.0 - 16.0 g/dL    HCT 35.0 35.0 - 45.0 %    MCV 91.6 78.0 - 100.0 FL    MCH 28.3 24.0 - 34.0 PG    MCHC 30.9 (L) 31.0 - 37.0 g/dL    RDW 18.6 (H) 11.6 - 14.5 %    PLATELET 597 609 - 447 K/uL    MPV 10.0 9.2 - 11.8 FL    NRBC 0.8 (H) 0  WBC    ABSOLUTE NRBC 0.05 (H) 0.00 - 0.01 K/uL    NEUTROPHILS 69 40 - 73 %    LYMPHOCYTES 19 (L) 21 - 52 %    MONOCYTES 10 3 - 10 %    EOSINOPHILS 1 0 - 5 %    BASOPHILS 0 0 - 2 %    IMMATURE GRANULOCYTES 1 (H) 0.0 - 0.5 %    ABS. NEUTROPHILS 4.2 1.8 - 8.0 K/UL    ABS. LYMPHOCYTES 1.2 0.9 - 3.6 K/UL    ABS. MONOCYTES 0.6 0.05 - 1.2 K/UL    ABS. EOSINOPHILS 0.0 0.0 - 0.4 K/UL    ABS. BASOPHILS 0.0 0.0 - 0.1 K/UL    ABS. IMM.  GRANS. 0.0 0.00 - 0.04 K/UL    DF AUTOMATED     METABOLIC PANEL, BASIC    Collection Time: 01/13/23  9:55 AM   Result Value Ref Range    Sodium 139 136 - 145 mmol/L    Potassium 4.6 3.5 - 5.5 mmol/L    Chloride 104 100 - 111 mmol/L    CO2 26 21 - 32 mmol/L    Anion gap 9 3.0 - 18 mmol/L    Glucose 153 (H) 74 - 99 mg/dL    BUN 23 (H) 7.0 - 18 MG/DL    Creatinine 1.58 (H) 0.6 - 1.3 MG/DL    BUN/Creatinine ratio 15 12 - 20      eGFR 35 (L) >60 ml/min/1.73m2    Calcium 9.6 8.5 - 10.1 MG/DL   TROPONIN-HIGH SENSITIVITY    Collection Time: 01/13/23  9:55 AM   Result Value Ref Range    Troponin-High Sensitivity 25 0 - 54 ng/L   TROPONIN-HIGH SENSITIVITY    Collection Time: 01/13/23  2:35 PM   Result Value Ref Range    Troponin-High Sensitivity 25 0 - 54 ng/L       Radiologic Studies -   XR CHEST PORT   Final Result   :      1. No acute cardiopulmonary disease. Medical Decision Making   I am the first provider for this patient. I reviewed the vital signs, available nursing notes, past medical history, past surgical history, family history and social history. Records Reviewed: Nursing Notes, Old Medical Records, Previous electrocardiograms, Previous Radiology Studies, and Previous Laboratory Studies (Time of Review: 9:38 AM)    Vital Signs-Reviewed the patient's vital signs. EKG: Sinus rhythm at 98 bpm.  No STEMI or hyperacute T waves. As interpreted by me    ED Course: Progress Notes, Reevaluation, and Consults:    Provider Notes (Medical Decision Making):       MDM  Number of Diagnoses or Management Options  COPD exacerbation ([facility]  Diagnosis management comments: 63-year-old female presenting to the emergency department for evaluation of shortness of breath. She appears no acute distress. Saturating 96% on room air. Lung auscultation reveals diffuse expiratory wheezes. She does not appear fluid overloaded. Will obtain screening and cardiac labs as well as chest x-ray. Patient will be treated with DuoNeb treatments and Solu-Medrol.       1653:  Patient became tachycardic up into the 150s after receiving her third DuoNeb breathing treatment and was complaining of sharp stabbing chest pain.  Repeat troponin was obtained which is negative. Pain subsided with nitro. Patient was given 500 cc of IV fluid. Her heart rate seems to have improved now down to 115. She still has diffuse expiratory wheezes heard on exam.  Plan on admission for continued management of COPD exacerbation. Case discussed with Dr. Naima Ferreira for admission. Procedures          Diagnosis     Clinical Impression:   1. COPD exacerbation (HCC)        Disposition: admitted, telemetry    Follow-up Information    None          Patient's Medications   Start Taking    No medications on file   Continue Taking    ALBUTEROL (PROVENTIL HFA, VENTOLIN HFA, PROAIR HFA) 90 MCG/ACTUATION INHALER        ALBUTEROL-IPRATROPIUM (DUO-NEB) 2.5 MG-0.5 MG/3 ML NEBU    3 mL by Nebulization route every six (6) hours as needed for Wheezing. ASPIRIN 81 MG CHEWABLE TABLET    Take 2 Tabs by mouth daily. ATORVASTATIN (LIPITOR) 80 MG TABLET    Take 80 mg by mouth nightly. CARVEDILOL (COREG) 25 MG TABLET        EZETIMIBE (ZETIA) 10 MG TABLET    Take 10 mg by mouth daily. GABAPENTIN (NEURONTIN) 400 MG CAPSULE    Take 400 mg by mouth in the morning. HYDRALAZINE (APRESOLINE) 100 MG TABLET    Take 100 mg by mouth two (2) times a day. INSULIN DETEMIR U-100 (LEVEMIR U-100 INSULIN) 100 UNIT/ML INJECTION    15 units daily for diabetes once daily with lunch    METOPROLOL TARTRATE (LOPRESSOR) 25 MG TABLET    Take 1 Tablet by mouth two (2) times a day. ONETOUCH ULTRA TEST STRIP    USE TO TEST BLOOD SUGAR TWICE DAILY    TIZANIDINE (ZANAFLEX) 4 MG TABLET    Take 4 mg by mouth two (2) times daily as needed. UMECLIDINIUM-VILANTEROL (ANORO ELLIPTA) 62.5-25 MCG/ACTUATION INHALER    Take 1 Puff by inhalation daily. For COPD   These Medications have changed    No medications on file   Stop Taking    No medications on file     Disclaimer: Sections of this note are dictated using utilizing voice recognition software.   Minor typographical errors may be present. If questions arise, please do not hesitate to contact me or call our department.

## 2023-01-13 NOTE — ED NOTES
Pt has increase in pulse. Dr Gildardo Juarez notified. Pt also complaining on stabbing chest pain.   to the bedside

## 2023-01-14 LAB
ALBUMIN SERPL-MCNC: 3.5 G/DL (ref 3.4–5)
ANION GAP SERPL CALC-SCNC: 10 MMOL/L (ref 3–18)
BUN SERPL-MCNC: 35 MG/DL (ref 7–18)
BUN/CREAT SERPL: 17 (ref 12–20)
CALCIUM SERPL-MCNC: 9 MG/DL (ref 8.5–10.1)
CHLORIDE SERPL-SCNC: 105 MMOL/L (ref 100–111)
CO2 SERPL-SCNC: 22 MMOL/L (ref 21–32)
CREAT SERPL-MCNC: 2.05 MG/DL (ref 0.6–1.3)
ERYTHROCYTE [DISTWIDTH] IN BLOOD BY AUTOMATED COUNT: 18.6 % (ref 11.6–14.5)
FOLATE SERPL-MCNC: 4.7 NG/ML (ref 3.1–17.5)
GLUCOSE BLD STRIP.AUTO-MCNC: 214 MG/DL (ref 70–110)
GLUCOSE BLD STRIP.AUTO-MCNC: 248 MG/DL (ref 70–110)
GLUCOSE BLD STRIP.AUTO-MCNC: 339 MG/DL (ref 70–110)
GLUCOSE SERPL-MCNC: 251 MG/DL (ref 74–99)
HCT VFR BLD AUTO: 31.4 % (ref 35–45)
HGB BLD-MCNC: 9.7 G/DL (ref 12–16)
MCH RBC QN AUTO: 28.4 PG (ref 24–34)
MCHC RBC AUTO-ENTMCNC: 30.9 G/DL (ref 31–37)
MCV RBC AUTO: 91.8 FL (ref 78–100)
NRBC # BLD: 0.08 K/UL (ref 0–0.01)
NRBC BLD-RTO: 0.9 PER 100 WBC
PHOSPHATE SERPL-MCNC: 3.7 MG/DL (ref 2.5–4.9)
PLATELET # BLD AUTO: 289 K/UL (ref 135–420)
PMV BLD AUTO: 9.7 FL (ref 9.2–11.8)
POTASSIUM SERPL-SCNC: 4.4 MMOL/L (ref 3.5–5.5)
RBC # BLD AUTO: 3.42 M/UL (ref 4.2–5.3)
SODIUM SERPL-SCNC: 137 MMOL/L (ref 136–145)
VIT B12 SERPL-MCNC: 275 PG/ML (ref 211–911)
WBC # BLD AUTO: 8.8 K/UL (ref 4.6–13.2)

## 2023-01-14 PROCEDURE — 74011250637 HC RX REV CODE- 250/637: Performed by: INTERNAL MEDICINE

## 2023-01-14 PROCEDURE — 74011636637 HC RX REV CODE- 636/637: Performed by: PHYSICIAN ASSISTANT

## 2023-01-14 PROCEDURE — 2709999900 HC NON-CHARGEABLE SUPPLY

## 2023-01-14 PROCEDURE — 36415 COLL VENOUS BLD VENIPUNCTURE: CPT

## 2023-01-14 PROCEDURE — 85027 COMPLETE CBC AUTOMATED: CPT

## 2023-01-14 PROCEDURE — 74011000250 HC RX REV CODE- 250: Performed by: PHYSICIAN ASSISTANT

## 2023-01-14 PROCEDURE — 80069 RENAL FUNCTION PANEL: CPT

## 2023-01-14 PROCEDURE — 94640 AIRWAY INHALATION TREATMENT: CPT

## 2023-01-14 PROCEDURE — 74011250637 HC RX REV CODE- 250/637: Performed by: PHYSICIAN ASSISTANT

## 2023-01-14 PROCEDURE — 82962 GLUCOSE BLOOD TEST: CPT

## 2023-01-14 PROCEDURE — 77010033678 HC OXYGEN DAILY

## 2023-01-14 PROCEDURE — 5A1D70Z PERFORMANCE OF URINARY FILTRATION, INTERMITTENT, LESS THAN 6 HOURS PER DAY: ICD-10-PCS | Performed by: EMERGENCY MEDICINE

## 2023-01-14 PROCEDURE — 99222 1ST HOSP IP/OBS MODERATE 55: CPT | Performed by: INTERNAL MEDICINE

## 2023-01-14 PROCEDURE — 74011250637 HC RX REV CODE- 250/637: Performed by: HOSPITALIST

## 2023-01-14 PROCEDURE — 74011636637 HC RX REV CODE- 636/637: Performed by: INTERNAL MEDICINE

## 2023-01-14 PROCEDURE — 65270000046 HC RM TELEMETRY

## 2023-01-14 PROCEDURE — 94761 N-INVAS EAR/PLS OXIMETRY MLT: CPT

## 2023-01-14 PROCEDURE — 74011250636 HC RX REV CODE- 250/636: Performed by: PHYSICIAN ASSISTANT

## 2023-01-14 PROCEDURE — 74011250636 HC RX REV CODE- 250/636: Performed by: INTERNAL MEDICINE

## 2023-01-14 PROCEDURE — 90935 HEMODIALYSIS ONE EVALUATION: CPT

## 2023-01-14 PROCEDURE — 82607 VITAMIN B-12: CPT

## 2023-01-14 PROCEDURE — 99232 SBSQ HOSP IP/OBS MODERATE 35: CPT | Performed by: INTERNAL MEDICINE

## 2023-01-14 PROCEDURE — 74011250636 HC RX REV CODE- 250/636: Performed by: HOSPITALIST

## 2023-01-14 RX ORDER — CHOLECALCIFEROL (VITAMIN D3) 125 MCG
5 CAPSULE ORAL
Status: DISCONTINUED | OUTPATIENT
Start: 2023-01-14 | End: 2023-01-25 | Stop reason: HOSPADM

## 2023-01-14 RX ORDER — IPRATROPIUM BROMIDE AND ALBUTEROL SULFATE 2.5; .5 MG/3ML; MG/3ML
3 SOLUTION RESPIRATORY (INHALATION)
Status: DISCONTINUED | OUTPATIENT
Start: 2023-01-14 | End: 2023-01-14 | Stop reason: SDUPTHER

## 2023-01-14 RX ORDER — TRAMADOL HYDROCHLORIDE 50 MG/1
50 TABLET ORAL
Status: DISCONTINUED | OUTPATIENT
Start: 2023-01-14 | End: 2023-01-19

## 2023-01-14 RX ORDER — HEPARIN SODIUM 5000 [USP'U]/ML
5000 INJECTION, SOLUTION INTRAVENOUS; SUBCUTANEOUS EVERY 8 HOURS
Status: DISCONTINUED | OUTPATIENT
Start: 2023-01-14 | End: 2023-01-25 | Stop reason: HOSPADM

## 2023-01-14 RX ADMIN — SODIUM CHLORIDE, PRESERVATIVE FREE 10 ML: 5 INJECTION INTRAVENOUS at 21:33

## 2023-01-14 RX ADMIN — BUDESONIDE 500 MCG: 0.5 INHALANT RESPIRATORY (INHALATION) at 09:15

## 2023-01-14 RX ADMIN — HEPARIN SODIUM 5000 UNITS: 5000 INJECTION INTRAVENOUS; SUBCUTANEOUS at 09:48

## 2023-01-14 RX ADMIN — EPOETIN ALFA-EPBX 4000 UNITS: 4000 INJECTION, SOLUTION INTRAVENOUS; SUBCUTANEOUS at 21:32

## 2023-01-14 RX ADMIN — BUDESONIDE 500 MCG: 0.5 INHALANT RESPIRATORY (INHALATION) at 20:34

## 2023-01-14 RX ADMIN — ACETAMINOPHEN 650 MG: 325 TABLET, FILM COATED ORAL at 00:44

## 2023-01-14 RX ADMIN — Medication 6 UNITS: at 21:52

## 2023-01-14 RX ADMIN — METHYLPREDNISOLONE SODIUM SUCCINATE 80 MG: 40 INJECTION, POWDER, FOR SOLUTION INTRAMUSCULAR; INTRAVENOUS at 08:39

## 2023-01-14 RX ADMIN — GUAIFENESIN 600 MG: 600 TABLET, EXTENDED RELEASE ORAL at 09:00

## 2023-01-14 RX ADMIN — Medication 5 UNITS: at 21:51

## 2023-01-14 RX ADMIN — ATORVASTATIN CALCIUM 80 MG: 40 TABLET, FILM COATED ORAL at 21:34

## 2023-01-14 RX ADMIN — GUAIFENESIN 600 MG: 600 TABLET, EXTENDED RELEASE ORAL at 21:33

## 2023-01-14 RX ADMIN — EZETIMIBE 10 MG: 10 TABLET ORAL at 08:43

## 2023-01-14 RX ADMIN — METOPROLOL TARTRATE 25 MG: 25 TABLET, FILM COATED ORAL at 18:29

## 2023-01-14 RX ADMIN — HEPARIN SODIUM 5000 UNITS: 5000 INJECTION INTRAVENOUS; SUBCUTANEOUS at 18:29

## 2023-01-14 RX ADMIN — Medication 4 UNITS: at 08:39

## 2023-01-14 RX ADMIN — Medication 5 MG: at 21:33

## 2023-01-14 RX ADMIN — AZITHROMYCIN MONOHYDRATE 500 MG: 500 INJECTION, POWDER, LYOPHILIZED, FOR SOLUTION INTRAVENOUS at 16:53

## 2023-01-14 RX ADMIN — SODIUM CHLORIDE, PRESERVATIVE FREE 10 ML: 5 INJECTION INTRAVENOUS at 15:14

## 2023-01-14 RX ADMIN — METHYLPREDNISOLONE SODIUM SUCCINATE 80 MG: 40 INJECTION, POWDER, FOR SOLUTION INTRAMUSCULAR; INTRAVENOUS at 14:00

## 2023-01-14 RX ADMIN — IPRATROPIUM BROMIDE: 0.5 SOLUTION RESPIRATORY (INHALATION) at 20:33

## 2023-01-14 RX ADMIN — METHYLPREDNISOLONE SODIUM SUCCINATE 40 MG: 40 INJECTION, POWDER, FOR SOLUTION INTRAMUSCULAR; INTRAVENOUS at 21:32

## 2023-01-14 RX ADMIN — SODIUM CHLORIDE, PRESERVATIVE FREE 10 ML: 5 INJECTION INTRAVENOUS at 08:41

## 2023-01-14 RX ADMIN — ARFORMOTEROL TARTRATE 15 MCG: 15 SOLUTION RESPIRATORY (INHALATION) at 09:15

## 2023-01-14 RX ADMIN — ASPIRIN 81 MG CHEWABLE TABLET 162 MG: 81 TABLET CHEWABLE at 08:42

## 2023-01-14 RX ADMIN — Medication 8 UNITS: at 16:53

## 2023-01-14 RX ADMIN — GABAPENTIN 400 MG: 400 CAPSULE ORAL at 08:43

## 2023-01-14 RX ADMIN — ARFORMOTEROL TARTRATE 15 MCG: 15 SOLUTION RESPIRATORY (INHALATION) at 20:33

## 2023-01-14 RX ADMIN — TRAMADOL HYDROCHLORIDE 50 MG: 50 TABLET, COATED ORAL at 09:48

## 2023-01-14 NOTE — DIALYSIS
EMILI        ACUTE HEMODIALYSIS FLOW SHEET      CATHETER ACCESS: []N/A   [x]Right   []Left   [x]IJ     []Fem   [x]chest wall   [] First use X-ray verified     [x]Tunnel                [] Non Tunneled   [x]No S/S infection  []Redness  []Drainage []Cultured []Swelling []Pain   [x]Medical Aseptic Prep Utilized   [x]Dressing Changed  [] Biopatch  Date:       []Clotted   [x]Patent   Flows: [x]Good  []Poor  []Reversed   If access problem,  notified: []Yes    [x]N/A  Date:                       GENERAL ASSESSMENT:      LUNGS:  Rate  SaO2% [] N/A    [x] Clear  [] Coarse  [] Crackles  [] Wheezing        [] Diminished     Location : []RLL   []LLL    []RUL  []ISAURO     Cough: []Productive  []Dry  [x]N/A   Respirations:  [x]Easy  []Labored     Therapy:   []RA  [x]NC 2 l/min    Mask: []NRB []Venti       O2%                  []Ventilator  []Intubated  [] Trach  [] BiPaP     CARDIAC: [x]Regular      [] Irregular   [] Pericardial Rub  [] JVD        []  Monitored  [] Bedside  [] Remotely monitored [] N/A  Rhythm:      EDEMA: [] None  [x]Generalized  [] Pitting [] 1    [] 2    [] 3    [] 4                 [] Facial  [] Pedal  []  UE  [] LE     SKIN:   [x] Warm  [] Hot     [] Cold   [x] Dry     [] Pale   [] Diaphoretic                  [] Flushed  [] Jaundiced  [] Cyanotic  [] Rash  [] Weeping     LOC:    [x] Alert      [x]Oriented:    [x] Person     [x] Place  [x]Time               [] Confused  [] Lethargic  [] Medicated  [] Non-responsive     GI / ABDOMEN:  [] Flat    [] Distended    [x] Soft    [] Firm   []  Obese                             [] Diarrhea  [x] Bowel Sounds  [] Nausea  [] Vomiting       / URINE ASSESSMENT:[] Voiding   [x] Oliguria  [] Anuria   []  Frye     [] Incontinent    []  Incontinent Brief      []  Bathroom Privileges       PAIN: [x] 0 []1  []2   []3   []4   []5   []6   []7   []8   []9   []10              Scale 0-10  Action/Follow Up:      MOBILITY:  [] Amb    [] Amb/Assist    [x] Bed    [] Wheelchair  [] Stretcher

## 2023-01-14 NOTE — ED NOTES
TRANSFER - OUT REPORT:    Verbal report given to Marylee Robertson, RN on Oswaldo Rodriguez  being transferred to (unit) for routine progression of care       Report consisted of patients Situation, Background, Assessment and   Recommendations(SBAR). Information from the following report(s) SBAR, Kardex, ED Summary, and MAR was reviewed with the receiving nurse. Lines:   Peripheral IV 01/13/23 Left Antecubital (Active)   Site Assessment Clean, dry, & intact 01/13/23 1015   Phlebitis Assessment 0 01/13/23 1015   Infiltration Assessment 0 01/13/23 1015   Dressing Status Clean, dry, & intact 01/13/23 1015   Dressing Type Transparent 01/13/23 1015   Hub Color/Line Status Pink;Flushed;Patent 01/13/23 1015   Action Taken Blood drawn 01/13/23 1015        Opportunity for questions and clarification was provided.       Patient transported with:   Tech/ transporter

## 2023-01-14 NOTE — PROGRESS NOTES
Occupational Therapy Note:  Order received, chart reviewed and this patient continues to be off the floor in dialysis. Will follow up as appropriate for this patient.  Olga Jack OTR/L

## 2023-01-14 NOTE — PROGRESS NOTES
[facility] Hospitalist Group  Progress Note    Patient: Eligio Haq Age: 81 y.o. : 1950 MR#: [id number] SSN: [id number]  Date/Time: 2023    Subjective:     Pt states her breathing is better but still has sob. Seen during HD. Assessment/Plan:    81year old female w/ h/o ESRD, COPD not on home , HFpEF admitted to the ED after presenting with c/o severe SOB and wheezing. -COPD w/ acute exacerbation: on IV steroid, bronchodilators, on supplemental 02, may have component of fluid overload but doubt it given onset of symptoms after completion of HD. Pulmonologist consulted. HISTORY OF:  -HTN  -CAD  -HFpEF: last echo in 2022-->EF: 50-55%  -Type 2DM: detemir 15 units/day as outpt, here on lantus 5 units, blood sugars within acceptable limits for the most part.   -CAD    PLAN:  -Cont bronchodilators, 02 support  -Decrease steroid  -Cont HD  -Cont azithromycin  -Cont pt/ot    Additional Notes:      Case discussed with:  [x]Patient  []Family  []Nursing  []Case Management  DVT Prophylaxis:  []Lovenox  [x]Hep SQ  []SCDs  []Coumadin   []On Heparin gtt    Objective:   VS: Visit Vitals  BP (!) 142/71   Pulse 85   Temp 97.3 °F (36.3 °C) (Temporal)   Resp 18   Ht 5' 4\" (1.626 m)   Wt 96.6 kg (213 lb)   SpO2 96%   Breastfeeding Unknown   BMI 36.56 kg/m²      Tmax/24hrs: Temp (24hrs), Av °F (36.7 °C), Min:97.2 °F (36.2 °C), Max:98.7 °F (37.1 °C)    Input/Output:   Intake/Output Summary (Last 24 hours) at 2023 1505  Last data filed at 2023 1401  Gross per 24 hour   Intake 480 ml   Output 2900 ml   Net -2420 ml       General:  alert, awake, in nad  Cardiovascular:  no jvd, no peripheral edema  Pulmonary:  w/o increased wob, ctab  GI:  soft, nt, nd  Extremities:  no edema  Additional:      Labs:    Recent Results (from the past 24 hour(s))   RESPIRATORY VIRUS PANEL W/COVID-19, PCR    Collection Time: 23  5:37 PM    Specimen: Nasopharyngeal   Result Value Ref Range Adenovirus Not detected NOTD      Coronavirus 229E Not detected NOTD      Coronavirus HKU1 Not detected NOTD      Coronavirus CVNL63 Not detected NOTD      Coronavirus OC43 Not detected NOTD      SARS-CoV-2, PCR Not detected NOTD      Metapneumovirus Not detected NOTD      Rhinovirus and Enterovirus Not detected NOTD      Influenza A Not detected NOTD      Influenza A, subtype H1 Not detected NOTD      Influenza A, subtype H3 Not detected NOTD      INFLUENZA A H1N1 PCR Not detected NOTD      Influenza B Not detected NOTD      Parainfluenza 1 Not detected NOTD      Parainfluenza 2 Not detected NOTD      Parainfluenza 3 Not detected NOTD      Parainfluenza virus 4 Not detected NOTD      RSV by PCR Not detected NOTD      B. parapertussis, PCR Not detected NOTD      Bordetella pertussis - PCR Not detected NOTD      Chlamydophila pneumoniae DNA, QL, PCR Not detected NOTD      Mycoplasma pneumoniae DNA, QL, PCR Not detected NOTD     GLUCOSE, POC    Collection Time: 01/13/23  9:23 PM   Result Value Ref Range    Glucose (POC) 342 (H) 70 - 110 mg/dL   CBC W/O DIFF    Collection Time: 01/14/23  7:04 AM   Result Value Ref Range    WBC 8.8 4.6 - 13.2 K/uL    RBC 3.42 (L) 4.20 - 5.30 M/uL    HGB 9.7 (L) 12.0 - 16.0 g/dL    HCT 31.4 (L) 35.0 - 45.0 %    MCV 91.8 78.0 - 100.0 FL    MCH 28.4 24.0 - 34.0 PG    MCHC 30.9 (L) 31.0 - 37.0 g/dL    RDW 18.6 (H) 11.6 - 14.5 %    PLATELET 519 324 - 782 K/uL    MPV 9.7 9.2 - 11.8 FL    NRBC 0.9 (H) 0  WBC    ABSOLUTE NRBC 0.08 (H) 0.00 - 0.01 K/uL   RENAL FUNCTION PANEL    Collection Time: 01/14/23  7:04 AM   Result Value Ref Range    Sodium 137 136 - 145 mmol/L    Potassium 4.4 3.5 - 5.5 mmol/L    Chloride 105 100 - 111 mmol/L    CO2 22 21 - 32 mmol/L    Anion gap 10 3.0 - 18 mmol/L    Glucose 251 (H) 74 - 99 mg/dL    BUN 35 (H) 7.0 - 18 MG/DL    Creatinine 2.05 (H) 0.6 - 1.3 MG/DL    BUN/Creatinine ratio 17 12 - 20      eGFR 26 (L) >60 ml/min/1.73m2    Calcium 9.0 8.5 - 10.1 MG/DL    Phosphorus 3.7 2.5 - 4.9 MG/DL    Albumin 3.5 3.4 - 5.0 g/dL   VITAMIN B12 & FOLATE    Collection Time: 01/14/23  7:04 AM   Result Value Ref Range    Vitamin B12 275 211 - 911 pg/mL    Folate 4.7 3.10 - 17.50 ng/mL   GLUCOSE, POC    Collection Time: 01/14/23  7:50 AM   Result Value Ref Range    Glucose (POC) 248 (H) 70 - 110 mg/dL     Additional Data Reviewed:      Signed By: Astrid Castillo MD     January 14, 2023 3:05 PM

## 2023-01-14 NOTE — PROGRESS NOTES
Physical Therapy Note. Order received, chart reviewed and this patient is off the floor during attempt. Will continue to follow and complete eval as appropriate.     Dionne Diaz PT, DPT

## 2023-01-14 NOTE — PROGRESS NOTES
Pharmacist Review and Automatic Dose Adjustment of Prophylactic Enoxaparin    The reviewing pharmacist has made an adjustment to the ordered enoxaparin dose or converted to UFH per the approved [facility] protocol and table as identified below. Geraldo Malone is a 81 y.o. female. Estimated Creatinine Clearance: 28.8 mL/min (A) (based on SCr of 2.05 mg/dL (H)). Height:   Ht Readings from Last 1 Encounters:   01/13/23 162.6 cm (64\")     Weight:  Wt Readings from Last 1 Encounters:   01/13/23 96.6 kg (213 lb)               Plan: Based upon the patient's weight and renal function (hemodialysis pt), the ordered dose of enoxaparin 40 mg daily has been changed to Heparin 5000 units q 8h. Thank you,  Denisse Oden.  Pascual Willson

## 2023-01-14 NOTE — CONSULTS
[address] [place] [address] [facility]  Pulmonary, Critical Care, and Sleep Medicine    Name: Franky Rivera MRN: [id number]   : 1950 Hospital: [address] [place] [address]    Date: 2023        Pulmonary -Critical Care: Initial Patient Consult    Admission Date:   2023  LOS: 1  MAR reviewed and pertinent medications noted or modified as needed    IMPRESSION:   Acute hypoxic respiratory failure - Improving  Wheezing  - COPD and may have component of  \"cardiac asthma\" in the setting of chronic systolic/diastolic heart failure, favor the latter Echo from  with EF 45% and mild to moderate mitral regurgitation. CXR per my review notes some vascular congestion at bases RIGHT > LEFT  COPD: Small cystic changes noted on CT Chest 2022. No large blebs. PFT in 2017 consistent with restrictive lung disease. Patient does have prior smoking history and ongoing 2nd hand smoke exposure. Hypertension: CLVH on Echo  CHF: HFpEF: Diastolic dysfunction noted on echo  TESSA  Was previously followed by Dr. Pascual Lee to follow up and has not been seen for an extended period of time. DM 2  ESRD- HD dependent  CAD/History of STEMI in 2018 requiring intubation and mechanical ventilation  Former smoker 46 PY, quit in 2019, + 2nd hand smoke exposure from   Chronic pain syndrome  Morbid Obesity Body mass index is 41.81 kg/m²  H/O COVID Infection .   H/O Abnormal CT Chest Imagin2022- diffuse asymmetric GGOs and atelectasis- most likely from pulmonary edema- Resolved on CT Chest 22         RECOMMENDATIONS:     PULM:  Hold OP Anoro  Continue with Brovana/ Pulmicort Q12 hrs  Continue with DuoNeb Q6  Continue with Solumedrol 40mg Q 8 hrs- May decrease tomorrow  Continue PRN albuterol   Maintain aspiration precautions: HOB >30 degrees  SpO2 goal >92%  AVOID Over oxygenation  Bronchial /oral hygiene; IS at bedside please  Monitor I&Os  Follow up on pending cultures  Streamline antibiotics per culture data and clinical course  HD per nephrology  Upper Extremity Dopplers are ordered and pending at this time  Electrolyte management, prophylaxis and glycemic control per primary and respective consultants  OP Pulmonary- Sleep follow up  Will continue to follow      CODE STATUS: Full Code        Subjective/History: This patient has been seen and evaluated at the request of Dr. Danita Gambino for COPD exacerbation. Patient is a 81 y.o. female  PMHx of ESRD on HD, COPD not on home O2, CAD, DM, HTN, HFpEF. The patient has been seen by our group as an in-patient. She has not followed up with any Pulmonologist as an OP. The patient states she did not realized she needed to follow up with Pulmonary but that she will in the future. The patient reports increasing shortness of breath and wheezing over the past week- more significant after her HD session. She reports that she had to cut her run 15 minutes early so she could get her medial ride. When she got home she became more short of breath with wheeling. She also noted nausea without emesis. No known sick contacts. No fever or chills. No recurrent nausea. No emesis,  No chest pain    She continues to refrain from smoking, but her  does smoke with in and out side the home. Inhalers: Anoro and PRN albuterol nebs    Respiratory Triggers: smoke and cold air    Pets:  2 has 4: small dogs: chihuahua -poodle mixes- they do not sleep with patient and have their own room    Home: No known water leaks, no bug infestations    Obstructive Sleep Apnea (TESSA): The patient has history of TESSA. She was previously followed by Dr. Yeyo Lara. She reports that she had a recalled [employer] PAP device. Her DME was [facility]. She reports that she did register her device with [employer], but never got a replacement. The patient stopped using her device and actually brought her device back to [facility]. She has not used PAP therapy for an extended period of time. She is HD dependant.  She denies any diet indiscretions. The patient had ID earlier today in our HD unit. She has a THDC in her right upper chest wall. She had a AVF created a few weeks ago and is not ready for use      The patient reports that she is feeling much better today.   Her dyspnea at rest is resolve but she still has PIERSON walking from bed to bathroom which is not her baseline                     Inpat Anti-Infectives (From admission, onward)       Start     Ordered Stop    01/13/23 1724  azithromycin (ZITHROMAX) 500 mg in 0.9% sodium chloride 250 mL (VIAL-MATE)  500 mg,   IntraVENous,   EVERY 24 HOURS         01/13/23 1723 --             Patient Vitals for the past 24 hrs:   Temp Pulse Resp BP SpO2   01/14/23 1007 -- 87 18 (!) 162/88 --   01/14/23 1000 97.2 °F (36.2 °C) 85 18 (!) 158/79 --   01/14/23 0915 -- -- -- -- 96 %   01/14/23 0724 98.4 °F (36.9 °C) 82 18 129/68 95 %   01/14/23 0415 98.5 °F (36.9 °C) 96 18 (!) 158/81 94 %   01/13/23 2322 98.1 °F (36.7 °C) (!) 115 25 (!) 172/70 96 %   01/13/23 2120 98.7 °F (37.1 °C) 99 -- (!) 154/61 --   01/13/23 1835 -- (!) 128 -- (!) 133/58 96 %   01/13/23 1820 -- (!) 133 -- 134/82 95 %   01/13/23 1805 -- (!) 136 -- (!) 155/67 97 %   01/13/23 1750 -- (!) 118 -- (!) 128/56 100 %   01/13/23 1735 -- (!) 126 -- (!) 154/76 97 %   01/13/23 1720 -- (!) 128 -- (!) 155/74 97 %   01/13/23 1705 -- (!) 119 -- 139/63 97 %   01/13/23 1650 -- (!) 125 -- (!) 140/77 97 %   01/13/23 1641 -- (!) 116 -- 120/60 95 %   01/13/23 1611 -- (!) 125 -- (!) 108/94 97 %   01/13/23 1556 -- (!) 122 -- (!) 126/40 95 %   01/13/23 1541 -- (!) 125 -- 134/66 96 %   01/13/23 1526 -- (!) 121 -- (!) 98/53 96 %   01/13/23 1511 -- (!) 127 -- 138/69 95 %   01/13/23 1456 -- (!) 123 -- (!) 111/42 95 %   01/13/23 1444 -- (!) 134 -- 139/65 96 %   01/13/23 1441 -- (!) 131 -- (!) 87/46 96 %   01/13/23 1440 -- (!) 132 -- (!) 97/44 97 %   01/13/23 1437 -- (!) 132 -- 138/74 --   01/13/23 1435 -- (!) 133 -- (!) 100/45 95 %   01/13/23 1427 -- (!) 132 -- (!) 143/78 --   01/13/23 1426 -- (!) 131 -- (!) 163/58 95 %   01/13/23 1411 -- (!) 132 -- (!) 124/50 95 %   01/13/23 1356 -- (!) 130 -- (!) 105/39 94 %   01/13/23 1341 -- (!) 132 -- (!) 99/40 94 %   01/13/23 1322 -- -- -- -- 95 %   01/13/23 1315 -- (!) 139 -- (!) 119/51 91 %   01/13/23 1300 -- (!) 143 -- 134/64 93 %   01/13/23 1255 -- (!) 144 -- 135/62 93 %   01/13/23 1252 -- (!) 144 -- (!) 175/73 --   01/13/23 1245 -- (!) 152 -- (!) 175/73 96 %   01/13/23 1230 -- (!) 148 -- (!) 141/73 96 %   01/13/23 1142 -- (!) 109 -- (!) 154/66 100 %   01/13/23 1127 -- (!) 109 -- (!) 154/93 96 %   01/13/23 1112 -- (!) 104 -- 129/88 99 %   01/13/23 1057 -- 98 -- (!) 178/69 96 %   01/13/23 1042 -- 82 -- (!) 161/74 100 %   01/13/23 1027 -- 86 -- (!) 166/71 95 %           Past Medical History:   Diagnosis Date    Abnormal WBC count 05/17/2016    Anemia     Bilateral shoulder pain 09/27/2016    Chondromalacia of both patellae     Chronic heart failure with preserved ejection fraction ([facility] 1/13/2023    COPD (chronic obstructive pulmonary disease) ([facility] 09/2015    mild-mod dz; Dr Antione Albarran    Diabetes [facility]) 2013    Diabetic eye exam ([facility] 2016    Dilated cardiomyopathy ([facility]     Dyslipidemia     Gout     Heart attack ([facility] 10/18/2019    Stented    History of echocardiogram 11/14/2014    Mild LVE. EF 40%. Mild, diffuse hypk. Mild LAE. Mild MR.       Hypercholesteremia 01/08/2014    Hypertension 2000    Noncompliance with medications 02/16/2016    Obesity     Orthostatic hypotension 05/17/2016    Osteoarthritis of both knees     Pain management 04/01/2016    Dr. Herlinda Doherty     Popliteal cyst, bilateral      Sleep apnea     not using cpap    Stage 3b chronic kidney disease ([address] 08/08/2022    Vitamin D deficiency 10/16/2014      Past Surgical History:   Procedure Laterality Date    HX HEART CATHETERIZATION  2019    Coronary stent    HX TUBAL LIGATION      IR INSERT TUNL CVC W/O PORT OVER 5 YR  09/21/2022      Prior to Admission medications    Medication Sig Start Date End Date Taking? Authorizing Provider   albuterol (PROVENTIL HFA, VENTOLIN HFA, PROAIR HFA) 90 mcg/actuation inhaler  1/9/23   Provider, Historical   atorvastatin (LIPITOR) 80 mg tablet Take 80 mg by mouth nightly. 1/9/23   Provider, Historical   OneTouch Ultra Test strip USE TO TEST BLOOD SUGAR TWICE DAILY 1/9/23   Provider, Historical   ezetimibe (ZETIA) 10 mg tablet Take 10 mg by mouth daily. 1/9/23   Provider, Historical   hydrALAZINE (APRESOLINE) 100 mg tablet Take 100 mg by mouth two (2) times a day. 10/31/22   Provider, Historical   tiZANidine (ZANAFLEX) 4 mg tablet Take 4 mg by mouth two (2) times daily as needed. 11/14/22   Provider, Historical   metoprolol tartrate (LOPRESSOR) 25 mg tablet Take 1 Tablet by mouth two (2) times a day. 10/8/22   Bernard Lowe MD   insulin detemir U-100 (Levemir U-100 Insulin) 100 unit/mL injection 15 units daily for diabetes once daily with lunch 9/22/22   Blake Thomas MD   gabapentin (NEURONTIN) 400 mg capsule Take 400 mg by mouth in the morning. Other, MD Tg   albuterol-ipratropium (DUO-NEB) 2.5 mg-0.5 mg/3 ml nebu 3 mL by Nebulization route every six (6) hours as needed for Wheezing. 3/23/21   Pancho Sierra MD   aspirin 81 mg chewable tablet Take 2 Tabs by mouth daily. 7/19/19   Kendra Rabago NP   umeclidinium-vilanterol (ANORO ELLIPTA) 62.5-25 mcg/actuation inhaler Take 1 Puff by inhalation daily.  For COPD 4/26/17   Addie Banegas DNP     Current Facility-Administered Medications   Medication Dose Route Frequency    melatonin tablet 5 mg  5 mg Oral QHS    heparin (porcine) injection 5,000 Units  5,000 Units SubCUTAneous Q8H    albuterol/ipratropium (DUONEB) neb solution  1 Dose Nebulization Q6H RT    aspirin chewable tablet 162 mg  162 mg Oral DAILY    atorvastatin (LIPITOR) tablet 80 mg  80 mg Oral QHS    ezetimibe (ZETIA) tablet 10 mg  10 mg Oral DAILY    gabapentin (NEURONTIN) capsule 400 mg  400 mg Oral DAILY    sodium chloride (NS) flush 5-40 mL  5-40 mL IntraVENous Q8H    arformoteroL (BROVANA) neb solution 15 mcg  15 mcg Nebulization BID RT    budesonide (PULMICORT) 500 mcg/2 ml nebulizer suspension  500 mcg Nebulization BID RT    azithromycin (ZITHROMAX) 500 mg in 0.9% sodium chloride 250 mL (VIAL-MATE)  500 mg IntraVENous Q24H    metoprolol tartrate (LOPRESSOR) tablet 25 mg  25 mg Oral BID    insulin lispro (HUMALOG) injection   SubCUTAneous AC&HS    insulin glargine (LANTUS) injection 5 Units  5 Units SubCUTAneous QHS    methylPREDNISolone (PF) (SOLU-MEDROL) injection 80 mg  80 mg IntraVENous Q8H    guaiFENesin ER (MUCINEX) tablet 600 mg  600 mg Oral Q12H     No Known Allergies   Social History     Tobacco Use    Smoking status: Former     Packs/day: 1.00     Years: 52.00     Pack years: 52.00     Types: Cigarettes     Quit date: 2019     Years since quitting: 3.6    Smokeless tobacco: Never   Substance Use Topics    Alcohol use: No     Alcohol/week: 0.0 standard drinks      Family History   Problem Relation Age of Onset    Diabetes Mother     Hypertension Mother     Hypertension Father     Kidney Disease Maternal Aunt 50        Dialysis        Review of Systems:  A comprehensive review of systems was negative except for that written in the HPI. Objective:   Vital Signs:    Visit Vitals  BP (!) 162/88   Pulse 87   Temp 97.2 °F (36.2 °C) (Temporal)   Resp 18   Ht 5' 4\" (1.626 m)   Wt 96.6 kg (213 lb)   SpO2 96%   Breastfeeding Unknown   BMI 36.56 kg/m²       O2 Device: Nasal cannula   O2 Flow Rate (L/min): 2 l/min   Temp (24hrs), Av.2 °F (36.8 °C), Min:97.2 °F (36.2 °C), Max:98.7 °F (37.1 °C)       Intake/Output:   Last shift:       0701 -  1900  In: 240 [P.O.:240]  Out: -   Last 3 shifts: No intake/output data recorded.     Intake/Output Summary (Last 24 hours) at 2023 1019  Last data filed at 2023 1006  Gross per 24 hour   Intake 240 ml   Output -- Net 240 ml          Physical Exam:    General:  Alert, cooperative, no distress, appears stated age + Obese body habitus   Head:  Normocephalic, without obvious abnormality, atraumatic. Eyes:  Conjunctivae/corneas clear. PERRL, EOMs intact. Nose: Nares normal. Septum midline. Mucosa normal. No drainage or sinus tenderness. Throat: Lips, mucosa, and tongue normal. No teeth   Neck: Supple, symmetrical, trachea midline, no adenopathy, thyroid: no enlargment/tenderness/nodules, no carotid bruit and no JVD. Back:   Symmetric, no curvature. ROM normal.   Lungs:   Bilateral breath sounds present. Diffuse fine- end-expiratory wheezing - worse when breathing through her mouth- decreased when breathing through her nose. Some upper air-way sounds also heard- no not stridor. Chest wall:  THDC right upper chest wall. No tenderness or deformity.- No crepitus   Heart:  Regular rate and rhythm, S1, S2 normal, no murmur, click, rub or gallop. Abdomen:   Soft, non-tender. Bowel sounds normal. No masses,  No organomegaly. Extremities: Extremities normal, atraumatic, no cyanosis or edema. - unmatured AFV right arm- + palpable thrill   Pulses: 2+ and symmetric all extremities. Skin: Skin color, texture, turgor normal. No rashes or lesions   Lymph nodes:      Cervical, supraclavicular nodes: normal on palpation   Neurologic: Grossly nonfocal       Data:     Recent Labs     01/14/23  0704 01/13/23  0955   WBC 8.8 6.1   HGB 9.7* 10.8*   HCT 31.4* 35.0    292      Latest Reference Range & Units 12/9/22 12:48 12/10/22 04:27 12/11/22 02:46 12/12/22 04:35 12/13/22 01:46 1/13/23 09:55   EOSINOPHILS 0 - 5 % 3 3 2 2 2 1   ABS.  EOSINOPHILS 0.0 - 0.4 K/UL 0.1 0.1 0.1 0.1 0.1 0.0         Recent Labs     01/14/23  0704 01/13/23  0955    139   K 4.4 4.6    104   CO2 22 26   * 153*   BUN 35* 23*   CREA 2.05* 1.58*   CA 9.0 9.6   PHOS 3.7  --    ALB 3.5  --      Lab Results   Component Value Date/Time    NT pro-BNP 1,057 (H) 10/08/2022 05:15 PM    NT pro-BNP 2,013 (H) 09/09/2022 07:34 AM    NT pro-BNP 1,871 (H) 08/08/2022 01:48 AM    NT pro-BNP 3,952 (H) 07/08/2022 09:53 AM    NT pro-BNP 1,020 (H) 03/18/2021 01:29 AM     Lab Results   Component Value Date/Time    INR 1.0 09/09/2022 07:54 AM    INR 1.0 06/27/2022 11:45 PM    INR 1.3 (H) 03/19/2021 04:00 AM    INR 1.0 10/19/2019 01:25 AM    INR 1.0 06/08/2019 04:35 PM    Prothrombin time 13.5 09/09/2022 07:54 AM    Prothrombin time 13.9 06/27/2022 11:45 PM    Prothrombin time 16.0 (H) 03/19/2021 04:00 AM    Prothrombin time 13.2 10/19/2019 01:25 AM    Prothrombin time 13.2 06/08/2019 04:35 PM       No results for input(s): PH, PCO2, PO2, HCO3, FIO2 in the last 72 hours. No results for input(s): FIO2I, IFO2, HCO3I, IHCO3, HCOPOC, PCO2I, PCOPOC, IPHI, PHI, PHPOC, PO2I, PO2POC in the last 72 hours.     No lab exists for component: IPOC2    ENDO:  Lab Results   Component Value Date/Time    TSH 1.29 10/08/2022 05:15 PM    Triiodothyronine (T3), free 3.2 02/09/2016 08:47 AM    T4, Free 1.5 10/08/2022 05:15 PM       Lab Results   Component Value Date/Time    Glucose 251 (H) 01/14/2023 07:04 AM    Glucose 153 (H) 01/13/2023 09:55 AM    Glucose 166 (H) 12/23/2022 07:07 AM    Glucose 154 (H) 12/13/2022 01:46 AM    Glucose 170 (H) 12/12/2022 07:52 PM     MICRO:  Results       Procedure Component Value Units Date/Time    RESPIRATORY VIRUS PANEL W/COVID-19, PCR [[id number]] Collected: 01/13/23 5923    Order Status: Completed Specimen: Nasopharyngeal Updated: 01/13/23 1850     Adenovirus Not detected        Coronavirus 229E Not detected        Coronavirus HKU1 Not detected        Coronavirus CVNL63 Not detected        Coronavirus OC43 Not detected        SARS-CoV-2, PCR Not detected        Metapneumovirus Not detected        Rhinovirus and Enterovirus Not detected        Influenza A Not detected        Influenza A, subtype H1 Not detected        Influenza A, subtype H3 Not detected INFLUENZA A H1N1 PCR Not detected        Influenza B Not detected        Parainfluenza 1 Not detected        Parainfluenza 2 Not detected        Parainfluenza 3 Not detected        Parainfluenza virus 4 Not detected        RSV by PCR Not detected        B. parapertussis, PCR Not detected        Bordetella pertussis - PCR Not detected        Chlamydophila pneumoniae DNA, QL, PCR Not detected        Mycoplasma pneumoniae DNA, QL, PCR Not detected                 CARDIO:    EKG Results       Procedure 720 Value Units Date/Time    EKG, 12 LEAD, INITIAL [[id number]] Collected: 01/13/23 0942    Order Status: Completed Updated: 01/13/23 1856     Ventricular Rate 98 BPM      Atrial Rate 98 BPM      P-R Interval 160 ms      QRS Duration 78 ms      Q-T Interval 360 ms      QTC Calculation (Bezet) 459 ms      Calculated P Axis 74 degrees      Calculated R Axis 20 degrees      Calculated T Axis 86 degrees      Diagnosis --     Normal sinus rhythm  Left atrial enlargement  Minimal voltage criteria for LVH, may be normal variant ( [place] product )  Borderline ECG  When compared with ECG of 08-DEC-2022 12:19,  No significant change was found  Confirmed by Og Garza ([id number]) on 1/13/2023 6:56:19 PM              06/25/22    ECHO ADULT COMPLETE 06/29/2022 6/29/2022    Interpretation Summary    Left Ventricle: Mildly reduced left ventricular systolic function with a visually estimated EF of 50 - 55%. Left ventricle is mildly dilated. Increased wall thickness. Findings consistent with mild concentric hypertrophy. See diagram for wall motion findings. Diastolic dysfunction present with normal LV EF. Mitral Valve: Mildly thickened leaflet. Mild regurgitation with a centrally directed jet.     Signed by: Catarina Guerrero MD on 6/29/2022 12:12 PM      MEDS: Please also see MAR  Current Facility-Administered Medications   Medication Dose Route Frequency    melatonin tablet 5 mg  5 mg Oral QHS    heparin (porcine) injection 5,000 Units  5,000 Units SubCUTAneous Q8H    albuterol/ipratropium (DUONEB) neb solution  1 Dose Nebulization Q6H RT    aspirin chewable tablet 162 mg  162 mg Oral DAILY    atorvastatin (LIPITOR) tablet 80 mg  80 mg Oral QHS    ezetimibe (ZETIA) tablet 10 mg  10 mg Oral DAILY    gabapentin (NEURONTIN) capsule 400 mg  400 mg Oral DAILY    sodium chloride (NS) flush 5-40 mL  5-40 mL IntraVENous Q8H    arformoteroL (BROVANA) neb solution 15 mcg  15 mcg Nebulization BID RT    budesonide (PULMICORT) 500 mcg/2 ml nebulizer suspension  500 mcg Nebulization BID RT    azithromycin (ZITHROMAX) 500 mg in 0.9% sodium chloride 250 mL (VIAL-MATE)  500 mg IntraVENous Q24H    metoprolol tartrate (LOPRESSOR) tablet 25 mg  25 mg Oral BID    insulin lispro (HUMALOG) injection   SubCUTAneous AC&HS    insulin glargine (LANTUS) injection 5 Units  5 Units SubCUTAneous QHS    methylPREDNISolone (PF) (SOLU-MEDROL) injection 80 mg  80 mg IntraVENous Q8H    guaiFENesin ER (MUCINEX) tablet 600 mg  600 mg Oral Q12H               Imaging:  I have personally reviewed the patients radiographs and have reviewed the reports:                  CXR Results  (Last 48 hours)                 01/13/23 1024  XR CHEST PORT Final result    Impression:  :       1.  No acute cardiopulmonary disease. Narrative:  EXAM: XR CHEST PORT       Indications: sob       Comparison: December 2022       Findings:   Rotated to the right   Lines/Tubes/Devices:  Right IJ dialysis catheter unchanged with tip in the upper   right atrium. LUNGS: Clear    MEDIASTINUM: Unremarkable   BONES/SOFT TISSUES: No acute findings                     CT Results  (Last 48 hours)      None        CT- Chest:  9/20/22  IMPRESSION     1. Interval resolution of extensive bilateral lung airspace disease. 2.  Interval resolved small bilateral pleural effusions and mild cardiomegaly. 3.  Interval resolved lymphadenopathy.   4.  Chronic interstitial reticular and cystic changes of the lungs appear  stable. Thank you for your referral.            Complex decision making was made in the evaluation and management plans during this consultation. More than 50% of time was spent in counseling and coordination of care including review of data and discussion with other team members.           Edmundo Justice DO, [place] [id number] [facility]  Pulmonary, Critical Care, and Sleep Medicine

## 2023-01-14 NOTE — CONSULTS
Consult requested by: Mireille Chaves MD   Yousif Brown is a 81 y.o. female BLACK/ who is being seen on consult for esrd  Chief Complaint   Patient presents with    Shortness of Breath     Admission diagnosis: COPD with acute exacerbation ([facility]     HPI:    73 yr old with hx of  ESRD,,diabetes,htn,cardiomyopathy, presents in to ED for sob,wheezing,copd excerebration. CXR was negative for fluid      Patient goes to dialysis unit at [facility] on TTS schedule. Last dialysis was on thursday  I have discussed with dialysis unit staff. Past Medical History:   Diagnosis Date    Abnormal WBC count 05/17/2016    Anemia     Bilateral shoulder pain 09/27/2016    Chondromalacia of both patellae     Chronic heart failure with preserved ejection fraction ([facility] 1/13/2023    COPD (chronic obstructive pulmonary disease) ([facility] 09/2015    mild-mod dz; Dr Gema Meza    Diabetes [facility]) 2013    Diabetic eye exam ([facility] 2016    D[facility]     Dyslipidemia     Gout     Heart attack ([facility] 10/18/2019    Stented    History of echocardiogram 11/14/2014    Mild LVE. EF 40%. Mild, diffuse hypk. Mild LAE. Mild MR.       Hypercholesteremia 01/08/2014    Hypertension 2000    Noncompliance with medications 02/16/2016    Obesity     Orthostatic hypotension 05/17/2016    Osteoarthritis of both knees     Pain management 04/01/2016    Dr. Christian Starkey     Popliteal cyst, bilateral      Sleep apnea     not using cpap    Stage 3b chronic kidney disease ([facility] 08/08/2022    Vitamin D deficiency 10/16/2014      Past Surgical History:   Procedure Laterality Date    HX HEART CATHETERIZATION  2019    Coronary stent    HX TUBAL LIGATION      IR INSERT TUNL CVC W/O PORT OVER 5 YR  09/21/2022       Social History     Socioeconomic History    Marital status:      Spouse name: Not on file    Number of children: 3    Years of education: Not on file    Highest education level: Not on file   Occupational History Occupation: retired   Tobacco Use    Smoking status: Former     Packs/day: 1.00     Years: 52.00     Pack years: 52.00     Types: Cigarettes     Quit date: 5/20/2019     Years since quitting: 3.6    Smokeless tobacco: Never   Vaping Use    Vaping Use: Never used   Substance and Sexual Activity    Alcohol use: No     Alcohol/week: 0.0 standard drinks    Drug use: Never    Sexual activity: Yes     Partners: Male   Other Topics Concern     Service No    Blood Transfusions No    Caffeine Concern No    Occupational Exposure No    Hobby Hazards No    Sleep Concern No    Stress Concern No    Weight Concern No    Special Diet No    Back Care No    Exercise No    Bike Helmet No    Seat Belt Yes    Self-Exams Yes   Social History Narrative    Not on file     Social Determinants of Health     Financial Resource Strain: Not on file   Food Insecurity: Not on file   Transportation Needs: Not on file   Physical Activity: Not on file   Stress: Not on file   Social Connections: Not on file   Intimate Partner Violence: Not on file   Housing Stability: Not on file       Family History   Problem Relation Age of Onset    Diabetes Mother     Hypertension Mother     Hypertension Father     Kidney Disease Maternal Aunt 50        Dialysis     No Known Allergies     Home Medications:     Prior to Admission Medications   Prescriptions Last Dose Informant Patient Reported? Taking? OneTouch Ultra Test strip   Yes No   Sig: USE TO TEST BLOOD SUGAR TWICE DAILY   albuterol (PROVENTIL HFA, VENTOLIN HFA, PROAIR HFA) 90 mcg/actuation inhaler   Yes No   albuterol-ipratropium (DUO-NEB) 2.5 mg-0.5 mg/3 ml nebu   No No   Sig: 3 mL by Nebulization route every six (6) hours as needed for Wheezing. aspirin 81 mg chewable tablet   No No   Sig: Take 2 Tabs by mouth daily. atorvastatin (LIPITOR) 80 mg tablet   Yes No   Sig: Take 80 mg by mouth nightly.   ezetimibe (ZETIA) 10 mg tablet   Yes No   Sig: Take 10 mg by mouth daily.    gabapentin (NEURONTIN) 400 mg capsule   Yes No   Sig: Take 400 mg by mouth in the morning. hydrALAZINE (APRESOLINE) 100 mg tablet   Yes No   Sig: Take 100 mg by mouth two (2) times a day. insulin detemir U-100 (Levemir U-100 Insulin) 100 unit/mL injection   No No   Sig: 15 units daily for diabetes once daily with lunch   metoprolol tartrate (LOPRESSOR) 25 mg tablet   No No   Sig: Take 1 Tablet by mouth two (2) times a day. tiZANidine (ZANAFLEX) 4 mg tablet   Yes No   Sig: Take 4 mg by mouth two (2) times daily as needed. umeclidinium-vilanterol (ANORO ELLIPTA) 62.5-25 mcg/actuation inhaler   No No   Sig: Take 1 Puff by inhalation daily.  For COPD      Facility-Administered Medications: None       Current Facility-Administered Medications   Medication Dose Route Frequency    traMADoL (ULTRAM) tablet 50 mg  50 mg Oral Q6H PRN    melatonin tablet 5 mg  5 mg Oral QHS    heparin (porcine) injection 5,000 Units  5,000 Units SubCUTAneous Q8H    albuterol/ipratropium (DUONEB) neb solution  1 Dose Nebulization Q6H RT    aspirin chewable tablet 162 mg  162 mg Oral DAILY    atorvastatin (LIPITOR) tablet 80 mg  80 mg Oral QHS    ezetimibe (ZETIA) tablet 10 mg  10 mg Oral DAILY    gabapentin (NEURONTIN) capsule 400 mg  400 mg Oral DAILY    sodium chloride (NS) flush 5-40 mL  5-40 mL IntraVENous Q8H    sodium chloride (NS) flush 5-40 mL  5-40 mL IntraVENous PRN    acetaminophen (TYLENOL) tablet 650 mg  650 mg Oral Q6H PRN    Or    acetaminophen (TYLENOL) suppository 650 mg  650 mg Rectal Q6H PRN    polyethylene glycol (MIRALAX) packet 17 g  17 g Oral DAILY PRN    bisacodyL (DULCOLAX) suppository 10 mg  10 mg Rectal DAILY PRN    ondansetron (ZOFRAN ODT) tablet 4 mg  4 mg Oral Q8H PRN    Or    ondansetron (ZOFRAN) injection 4 mg  4 mg IntraVENous Q6H PRN    arformoteroL (BROVANA) neb solution 15 mcg  15 mcg Nebulization BID RT    budesonide (PULMICORT) 500 mcg/2 ml nebulizer suspension  500 mcg Nebulization BID RT    azithromycin (ZITHROMAX) 500 mg in 0.9% sodium chloride 250 mL (VIAL-MATE)  500 mg IntraVENous Q24H    metoprolol tartrate (LOPRESSOR) tablet 25 mg  25 mg Oral BID    insulin lispro (HUMALOG) injection   SubCUTAneous AC&HS    glucose chewable tablet 16 g  4 Tablet Oral PRN    glucagon (GLUCAGEN) injection 1 mg  1 mg IntraMUSCular PRN    dextrose 10% infusion 0-250 mL  0-250 mL IntraVENous PRN    insulin glargine (LANTUS) injection 5 Units  5 Units SubCUTAneous QHS    methylPREDNISolone (PF) (SOLU-MEDROL) injection 80 mg  80 mg IntraVENous Q8H    guaiFENesin ER (MUCINEX) tablet 600 mg  600 mg Oral Q12H       Review of Systems:      Complete 10-point review of systems were obtained and discussed in length  with the patient. Complete review of systems was negative/unremarkable  except as mentioned in HPI section. Data Review:    Labs: Results:       Chemistry Recent Labs     01/14/23  0704 01/13/23  0955   * 153*    139   K 4.4 4.6    104   CO2 22 26   BUN 35* 23*   CREA 2.05* 1.58*   CA 9.0 9.6   AGAP 10 9   BUCR 17 15   ALB 3.5  --       CBC w/Diff Recent Labs     01/14/23  0704 01/13/23  0955   WBC 8.8 6.1   RBC 3.42* 3.82*   HGB 9.7* 10.8*   HCT 31.4* 35.0    292   GRANS  --  69   LYMPH  --  19*   EOS  --  1      Coagulation No results for input(s): PTP, INR, APTT, INREXT in the last 72 hours. Iron/Ferritin No results for input(s): IRON in the last 72 hours. No lab exists for component: TIBCCALC   BNP No results for input(s): BNPP in the last 72 hours. Cardiac Enzymes No results for input(s): CPK, CKND1, GODFREY in the last 72 hours.     No lab exists for component: CKRMB, TROIP   Liver Enzymes Recent Labs     01/14/23  0704   ALB 3.5      Thyroid Studies Lab Results   Component Value Date/Time    TSH 1.29 10/08/2022 05:15 PM          Physical Assessment:   Visit Vitals  BP (!) 149/70   Pulse 84   Temp 97.2 °F (36.2 °C) (Temporal)   Resp 18   Ht 5' 4\" (1.626 m)   Wt 96.6 kg (213 lb)   SpO2 96% Breastfeeding Unknown   BMI 36.56 kg/m²     Weight change:     Intake/Output Summary (Last 24 hours) at 1/14/2023 1106  Last data filed at 1/14/2023 1006  Gross per 24 hour   Intake 240 ml   Output --   Net 240 ml     Physical Exam  General: comfortable, no acute distress   HEENT sclera anicteric, supple neck, no thyromegaly  CVS: S1S2 heard,  no rub  RS: + air entry b/l,   Abd: Soft, Non tender, Not distended, Positive bowel sounds, no organomegaly, no CVA / supra pubic tenderness  Neuro: non focal, awake, alert , CN II-XII are grossly intact  Extrm:mild edema, no cyanosis, clubbing   Skin: no visible  Rash  Musculoskeletal: No gross joints or bone deformities   Access; tdc  Procedures/imaging: see electronic medical records for all procedures, Xrays and details which were not copied into this note but were reviewed prior to creation of Plan      Impression & Plan:   IMPRESSION:   ESRD  COPD excerebration. CAD,cardiomyopathy  Secondary hyperparathyroidism  ACD   PLAN:   HD ongoing and tolerating well  Get phos level in am  Start epogen  If patient need IV contrast for CT imaging, need to be coordinated with renal team.   Avoid Gadolinium due to its association with nephrogenic systemic fibrosis in a patients with severe ARF and ESRD. Please dose all medications for creatinine clearance <15/dialysis. Avoid blood pressure checks, blood draws, peripheral iv's on arm with access. AvoidPICC lines on either arm in order to preserve veins for dialysis access creation.              Moe Juares MD  January 14, 2023  [facility]  Office [phone]

## 2023-01-14 NOTE — DIALYSIS
ACUTE HEMODIALYSIS FLOW SHEET    HEMODIALYSIS ORDERS: Physician: Dr. Yvonne Henry: Primo   Duration: 3 hr   BFR: 300   DFR: 500   Dialysate:  Temp 36-37*C   K+  2    Ca+ 2.5   Na 138   Bicarb 35   Wt Readings from Last 1 Encounters:   01/13/23 96.6 kg (213 lb)    Patient Chart [x]   Unable to Obtain []  Dry weight/UF Goal: 3000 ml    Heparin []  Bolus    Units    [] Hourly    Units    [x]None       Pre BP: 158/79  Pulse: 85  Respirations: 18 Temp: 97.2 temporal  [] Oral  [] Ax  [] Esoph   Labs: []  Pre  []  Post:   [x] N/A   Additional Orders (medications, blood products, hypotension management): [] Yes   [x] No     [x]  [facility] Consent Verified     CATHETER ACCESS:   [x]Right   []Left   []IJ   []Fem  [x]Chest wall  []TransHepatic   [] First use X-ray verified     [x]Tunnel    [] Non Tunneled   [x]No S/S infection  []Redness  []Drainage []Cultured []Swelling []Pain   [x]Medical Aseptic Prep Utilized   [x]Dressing Changed  01/14/2023 [] Biopatch  Date:    []Clotted   [x]Patent   Flows: [x]Good  []Poor  []Reversed   If access problem,  notified: []Yes    [x]N/A        GRAFT/FISTULA ACCESS:   [x]N/A                       Hospital: [facility]          Room # 202/17    [x] Routine         [] 1st Time Acute/Chronic   [] Urgent      [] Stat            [x] Acute Room   []  Bedside    [] ICU/CCU     [] ER     Isolation Precautions:  [x] Dialysis    There are currently no Active Isolations     ALLERGIES:     No Known Allergies     Code Status:  Full Code     Hepatitis Status      Lab Results   Component Value Date/Time    Hepatitis A, IgM NEGATIVE 03/23/2015 09:04 AM    Hepatitis B surface Ag <0.10 12/09/2022 07:03 PM    Hepatitis B surface Ab >1,000.00 12/09/2022 07:03 PM    Hepatitis B core, IgM NEGATIVE 03/23/2015 09:04 AM    Hepatitis C virus Ab 0.09 03/23/2015 09:04 AM        Current Labs:      Lab Results   Component Value Date/Time    WBC 8.8 01/14/2023 07:04 AM    HGB 9.7 (L) 01/14/2023 07:04 AM HCT 31.4 (L) 01/14/2023 07:04 AM    PLATELET 877 47/74/8438 07:04 AM    MCV 91.8 01/14/2023 07:04 AM     Lab Results   Component Value Date/Time    Sodium 137 01/14/2023 07:04 AM    Potassium 4.4 01/14/2023 07:04 AM    Chloride 105 01/14/2023 07:04 AM    CO2 22 01/14/2023 07:04 AM    Anion gap 10 01/14/2023 07:04 AM    Glucose 251 (H) 01/14/2023 07:04 AM    BUN 35 (H) 01/14/2023 07:04 AM    Creatinine 2.05 (H) 01/14/2023 07:04 AM    BUN/Creatinine ratio 17 01/14/2023 07:04 AM    GFR est AA 54 (L) 09/22/2022 04:20 AM    GFR est non-AA 44 (L) 09/22/2022 04:20 AM    Calcium 9.0 01/14/2023 07:04 AM          DIET:  DIET ADULT  DIET ADULT ORAL NUTRITION SUPPLEMENT     PRIMARY NURSE REPORT:   Pre Dialysis: Mana Hastings RN    Time: 0915      EDUCATION:    [x] Patient           Knowledge Basis: []None []Minimal [x] Substantial [] Unknown  Barriers to learning  [x]None  [] Intubated/Trached/Ventilated  [] Sedated/Paralyzed   [x] Access Care     [] S&S of infection  [] Fluid Management  [] K+   [x] Procedural       Teaching Tools:  [x] Explain  [] Demo  [] Handouts [] Video  Patient response: [x] Verbalized understanding   [] Requires follow up        [x] Time Out/Safety Check    [x] Extracorporeal Circuit Tested for integrity       RO/HEMODIALYSIS MACHINE SAFETY CHECKS - Before each treatment:        [facility]                                    [x] Unit Machine # 7 with centralized RO                                  [] Portable Machine #1/RO serial # [id number]                                  [] Portable Machine #2/RO serial # [id number]                                  [] Portable Machine #4/RO serial # [id number]                                  [] Portable Machine #10/RO serial # [id number]                                                                                                       Alarm Test:  Pass time 7315            [x] RO/Machine Log Complete    Machine Temp    36-37*C             Dialysate: pH  7.4 Conductivity: Meter 14.0    HD Machine  7     TCD: 13.8  Dialyzer Lot # [id number]     Blood Tubing Lot # [id number]     Saline Lot # [id number]     CHLORINE TESTING-Before each treatment and every 4 hours    Total Chlorine: [x] less than 0.1 ppm  Initial Time Check: 0800       4 Hr/2nd Check Time: 1200   (if greater than 0.1 ppm from Primary then every 30 minutes from Secondary)     TREATMENT INITIATION - with Dialysis Precautions:   [x] All Connections Secured              [x] Saline Line Double Clamped   [x] Venous Parameters Set               [x] Arterial Parameters Set    [x] Prime Given 250ml NSS              [x]Air Foam Detector Engaged        Treatment Initiation Note:  SEE RN HD NOTE    During Treatment Notes:  1000  Face & Vascular access visible with art and adan line connections intact. Pt tolerating dialysis. 1007  Face & Vascular access visible with art and adan line connections intact. Pt tolerating dialysis. 1015  Face & Vascular access visible with art and adan line connections intact. Pt tolerating dialysis. 1030  Face & Vascular access visible with art and adan line connections intact. Pt tolerating dialysis. 1045  Face & Vascular access visible with art and adan line connections intact. Pt tolerating dialysis. 1100  Face & Vascular access visible with art and adan line connections intact. Pt tolerating dialysis. 1115  Face & Vascular access visible with art and adan line connections intact. Pt tolerating dialysis. 1130  Face & Vascular access visible with art and adan line connections intact. Pt tolerating dialysis. 1145  Face & Vascular access visible with art and adan line connections intact. Pt tolerating dialysis. 1200  Face & Vascular access visible with art and adan line connections intact. Pt tolerating dialysis. 1215  Face & Vascular access visible with art and adan line connections intact. Pt tolerating dialysis.   1230  Face & Vascular access visible with art and adan line connections intact. Pt tolerating dialysis. 1245  Face & Vascular access visible with art and adan line connections intact. Pt tolerating dialysis. 1310  Dialysis treatment complete.            Post Assessment  Dialyzer Cleared:   [x] Good  [] Fair  [] Poor  Blood processed:  55.8 L  UF Removed:  3000 Ml    Post BP: 142/71  Pulse: 85  Respirations: 18   Temp: 97.3 temporal  [] Oral  [] Ax  [] Esophageal   Lungs: [] Clear                [x] No change from initial assessment   Post Tx Vascular Access: [x] N/A       Cardiac:  [x] Regular   [] Irregular   Rhythm:  [] Monitored   [x] Not Monitored    CVC Catheter:   Locking solution: Heparin 1:1000 U  Arterial port  1.9 ml   Venous port 1.9 ml   Edema:  [x] None  [] Generalized                     Skin:[] Warm  [x] Dry [] Diaphoretic               [] Flushed  [] Pale [] Cyanotic Pain:  [x]0  []1-2  []3-4  []5-6   []7-8  []9-10         Post Treatment Note:   SEE HD RN NOTE     POST TREATMENT PRIMARY NURSE HANDOFF REPORT:   Post Dialysis: Thaddeus Aquino RN        Time:  1330       Abbreviations: AVG-arterial venous graft, AVF-arterial venous fistula, IJ-Internal Jugular, Subcl-Subclavian, Fem-Femoral, Tx-treatment, AP/HR-apical heart rate, VSS- Vital Signs Stable, CVC- Central Venous Catheter, DFR-dialysate flow rate, BFR-blood flow rate, AP-arterial pressure, -venous pressure, UF-ultrafiltrate, TMP-transmembrane pressure, Adan-Venous, Art-Arterial, RO-Reverse Osmosis

## 2023-01-15 LAB
ALBUMIN SERPL-MCNC: 3.6 G/DL (ref 3.4–5)
ANION GAP SERPL CALC-SCNC: 9 MMOL/L (ref 3–18)
BASOPHILS # BLD: 0 K/UL (ref 0–0.1)
BASOPHILS NFR BLD: 0 % (ref 0–2)
BUN SERPL-MCNC: 43 MG/DL (ref 7–18)
BUN/CREAT SERPL: 19 (ref 12–20)
CALCIUM SERPL-MCNC: 9.5 MG/DL (ref 8.5–10.1)
CHLORIDE SERPL-SCNC: 102 MMOL/L (ref 100–111)
CO2 SERPL-SCNC: 27 MMOL/L (ref 21–32)
CREAT SERPL-MCNC: 2.26 MG/DL (ref 0.6–1.3)
DIFFERENTIAL METHOD BLD: ABNORMAL
EOSINOPHIL # BLD: 0 K/UL (ref 0–0.4)
EOSINOPHIL NFR BLD: 0 % (ref 0–5)
ERYTHROCYTE [DISTWIDTH] IN BLOOD BY AUTOMATED COUNT: 18.7 % (ref 11.6–14.5)
GLUCOSE BLD STRIP.AUTO-MCNC: 174 MG/DL (ref 70–110)
GLUCOSE BLD STRIP.AUTO-MCNC: 188 MG/DL (ref 70–110)
GLUCOSE BLD STRIP.AUTO-MCNC: 199 MG/DL (ref 70–110)
GLUCOSE BLD STRIP.AUTO-MCNC: 237 MG/DL (ref 70–110)
GLUCOSE SERPL-MCNC: 224 MG/DL (ref 74–99)
HCT VFR BLD AUTO: 33.8 % (ref 35–45)
HGB BLD-MCNC: 10.5 G/DL (ref 12–16)
IMM GRANULOCYTES # BLD AUTO: 0.1 K/UL (ref 0–0.04)
IMM GRANULOCYTES NFR BLD AUTO: 1 % (ref 0–0.5)
LYMPHOCYTES # BLD: 0.6 K/UL (ref 0.9–3.6)
LYMPHOCYTES NFR BLD: 5 % (ref 21–52)
MCH RBC QN AUTO: 28.5 PG (ref 24–34)
MCHC RBC AUTO-ENTMCNC: 31.1 G/DL (ref 31–37)
MCV RBC AUTO: 91.6 FL (ref 78–100)
MONOCYTES # BLD: 0.7 K/UL (ref 0.05–1.2)
MONOCYTES NFR BLD: 5 % (ref 3–10)
NEUTS SEG # BLD: 10.9 K/UL (ref 1.8–8)
NEUTS SEG NFR BLD: 89 % (ref 40–73)
NRBC # BLD: 0.2 K/UL (ref 0–0.01)
NRBC BLD-RTO: 1.6 PER 100 WBC
PHOSPHATE SERPL-MCNC: 3.4 MG/DL (ref 2.5–4.9)
PLATELET # BLD AUTO: 323 K/UL (ref 135–420)
PMV BLD AUTO: 10.1 FL (ref 9.2–11.8)
POTASSIUM SERPL-SCNC: 4.4 MMOL/L (ref 3.5–5.5)
RBC # BLD AUTO: 3.69 M/UL (ref 4.2–5.3)
SODIUM SERPL-SCNC: 138 MMOL/L (ref 136–145)
WBC # BLD AUTO: 12.3 K/UL (ref 4.6–13.2)

## 2023-01-15 PROCEDURE — 99232 SBSQ HOSP IP/OBS MODERATE 35: CPT | Performed by: INTERNAL MEDICINE

## 2023-01-15 PROCEDURE — 85025 COMPLETE CBC W/AUTO DIFF WBC: CPT

## 2023-01-15 PROCEDURE — 74011250637 HC RX REV CODE- 250/637: Performed by: PHYSICIAN ASSISTANT

## 2023-01-15 PROCEDURE — 97530 THERAPEUTIC ACTIVITIES: CPT

## 2023-01-15 PROCEDURE — 97161 PT EVAL LOW COMPLEX 20 MIN: CPT

## 2023-01-15 PROCEDURE — 74011250637 HC RX REV CODE- 250/637: Performed by: INTERNAL MEDICINE

## 2023-01-15 PROCEDURE — 65270000046 HC RM TELEMETRY

## 2023-01-15 PROCEDURE — 74011250636 HC RX REV CODE- 250/636: Performed by: INTERNAL MEDICINE

## 2023-01-15 PROCEDURE — 74011636637 HC RX REV CODE- 636/637: Performed by: INTERNAL MEDICINE

## 2023-01-15 PROCEDURE — 74011636637 HC RX REV CODE- 636/637: Performed by: PHYSICIAN ASSISTANT

## 2023-01-15 PROCEDURE — 36415 COLL VENOUS BLD VENIPUNCTURE: CPT

## 2023-01-15 PROCEDURE — 97165 OT EVAL LOW COMPLEX 30 MIN: CPT

## 2023-01-15 PROCEDURE — 94640 AIRWAY INHALATION TREATMENT: CPT

## 2023-01-15 PROCEDURE — 80069 RENAL FUNCTION PANEL: CPT

## 2023-01-15 PROCEDURE — 74011000250 HC RX REV CODE- 250: Performed by: PHYSICIAN ASSISTANT

## 2023-01-15 PROCEDURE — 2709999900 HC NON-CHARGEABLE SUPPLY

## 2023-01-15 PROCEDURE — 82962 GLUCOSE BLOOD TEST: CPT

## 2023-01-15 PROCEDURE — 74011000250 HC RX REV CODE- 250: Performed by: INTERNAL MEDICINE

## 2023-01-15 PROCEDURE — 74011250636 HC RX REV CODE- 250/636: Performed by: PHYSICIAN ASSISTANT

## 2023-01-15 PROCEDURE — 74011250637 HC RX REV CODE- 250/637: Performed by: HOSPITALIST

## 2023-01-15 RX ORDER — IPRATROPIUM BROMIDE 0.5 MG/2.5ML
0.5 SOLUTION RESPIRATORY (INHALATION)
Status: DISCONTINUED | OUTPATIENT
Start: 2023-01-15 | End: 2023-01-20

## 2023-01-15 RX ADMIN — BUDESONIDE 500 MCG: 0.5 INHALANT RESPIRATORY (INHALATION) at 09:19

## 2023-01-15 RX ADMIN — ATORVASTATIN CALCIUM 80 MG: 40 TABLET, FILM COATED ORAL at 21:46

## 2023-01-15 RX ADMIN — TRAMADOL HYDROCHLORIDE 50 MG: 50 TABLET, COATED ORAL at 09:39

## 2023-01-15 RX ADMIN — ARFORMOTEROL TARTRATE 15 MCG: 15 SOLUTION RESPIRATORY (INHALATION) at 09:19

## 2023-01-15 RX ADMIN — SODIUM CHLORIDE, PRESERVATIVE FREE 10 ML: 5 INJECTION INTRAVENOUS at 13:07

## 2023-01-15 RX ADMIN — EZETIMIBE 10 MG: 10 TABLET ORAL at 09:40

## 2023-01-15 RX ADMIN — ACETAMINOPHEN 650 MG: 325 TABLET, FILM COATED ORAL at 20:27

## 2023-01-15 RX ADMIN — ASPIRIN 81 MG CHEWABLE TABLET 162 MG: 81 TABLET CHEWABLE at 09:40

## 2023-01-15 RX ADMIN — GABAPENTIN 400 MG: 400 CAPSULE ORAL at 09:40

## 2023-01-15 RX ADMIN — HEPARIN SODIUM 5000 UNITS: 5000 INJECTION INTRAVENOUS; SUBCUTANEOUS at 03:00

## 2023-01-15 RX ADMIN — AZITHROMYCIN MONOHYDRATE 500 MG: 500 INJECTION, POWDER, LYOPHILIZED, FOR SOLUTION INTRAVENOUS at 16:55

## 2023-01-15 RX ADMIN — METHYLPREDNISOLONE SODIUM SUCCINATE 40 MG: 40 INJECTION, POWDER, FOR SOLUTION INTRAMUSCULAR; INTRAVENOUS at 21:46

## 2023-01-15 RX ADMIN — HEPARIN SODIUM 5000 UNITS: 5000 INJECTION INTRAVENOUS; SUBCUTANEOUS at 09:47

## 2023-01-15 RX ADMIN — METHYLPREDNISOLONE SODIUM SUCCINATE 40 MG: 40 INJECTION, POWDER, FOR SOLUTION INTRAMUSCULAR; INTRAVENOUS at 06:22

## 2023-01-15 RX ADMIN — Medication 5 UNITS: at 21:35

## 2023-01-15 RX ADMIN — SALINE NASAL SPRAY 2 SPRAY: 1.5 SOLUTION NASAL at 21:53

## 2023-01-15 RX ADMIN — BUDESONIDE 500 MCG: 0.5 INHALANT RESPIRATORY (INHALATION) at 21:07

## 2023-01-15 RX ADMIN — SODIUM CHLORIDE, PRESERVATIVE FREE 10 ML: 5 INJECTION INTRAVENOUS at 06:22

## 2023-01-15 RX ADMIN — Medication 6 UNITS: at 16:57

## 2023-01-15 RX ADMIN — METHYLPREDNISOLONE SODIUM SUCCINATE 40 MG: 40 INJECTION, POWDER, FOR SOLUTION INTRAMUSCULAR; INTRAVENOUS at 13:05

## 2023-01-15 RX ADMIN — Medication 3 UNITS: at 07:36

## 2023-01-15 RX ADMIN — METOPROLOL TARTRATE 25 MG: 25 TABLET, FILM COATED ORAL at 17:01

## 2023-01-15 RX ADMIN — GUAIFENESIN 600 MG: 600 TABLET, EXTENDED RELEASE ORAL at 09:40

## 2023-01-15 RX ADMIN — HEPARIN SODIUM 5000 UNITS: 5000 INJECTION INTRAVENOUS; SUBCUTANEOUS at 17:00

## 2023-01-15 RX ADMIN — Medication 3 UNITS: at 21:37

## 2023-01-15 RX ADMIN — TRAMADOL HYDROCHLORIDE 50 MG: 50 TABLET, COATED ORAL at 20:27

## 2023-01-15 RX ADMIN — ARFORMOTEROL TARTRATE 15 MCG: 15 SOLUTION RESPIRATORY (INHALATION) at 21:07

## 2023-01-15 RX ADMIN — METOPROLOL TARTRATE 25 MG: 25 TABLET, FILM COATED ORAL at 09:40

## 2023-01-15 RX ADMIN — Medication 5 MG: at 21:46

## 2023-01-15 RX ADMIN — GUAIFENESIN 600 MG: 600 TABLET, EXTENDED RELEASE ORAL at 20:31

## 2023-01-15 RX ADMIN — SODIUM CHLORIDE, PRESERVATIVE FREE 10 ML: 5 INJECTION INTRAVENOUS at 21:46

## 2023-01-15 RX ADMIN — Medication 3 UNITS: at 12:58

## 2023-01-15 RX ADMIN — IPRATROPIUM BROMIDE 0.5 MG: 0.5 SOLUTION RESPIRATORY (INHALATION) at 21:07

## 2023-01-15 NOTE — PROGRESS NOTES
conducted an initial consultation and Spiritual Assessment for Claudine Aparicio, who is a 81 y.o.,female. Patients Primary Language is: [place]. According to the patients EMR Confucianism Affiliation is: Other.      The reason the Patient came to the hospital is:   Patient Active Problem List    Diagnosis Date Noted    Sinus tachycardia 01/13/2023    Chronic heart failure with preserved ejection fraction ([address] 01/13/2023    Anemia due to chronic kidney disease, on chronic dialysis ([address] 01/13/2023    TIA (transient ischemic attack) 12/09/2022    Dizziness 12/09/2022    Stenosis of both vertebral arteries 12/09/2022    CVA (cerebral vascular accident) ([address] 12/08/2022    ESRD (end stage renal disease) on dialysis ([address] 12/08/2022    CHF (congestive heart failure) ([address] 09/09/2022    Pneumonia due to COVID-19 virus 08/08/2022    Acute on chronic diastolic heart failure ([address] 08/08/2022    Stage 3b chronic kidney disease ([address] 08/08/2022    Pulmonary hypertension ([address] 07/08/2022    Acute exacerbation of CHF (congestive heart failure) ([address] 07/08/2022    History of myocardial infarction 06/27/2022    Acute hyperkalemia 06/26/2022    Breast cancer ([address] 06/26/2022    Rectal cancer ([address] 06/26/2022    Hyperkalemia 03/07/2022    Stage 1 acute kidney injury ([address] 12/18/2021    Hypotension 03/19/2021    Sepsis ([address] 03/18/2021    Nonrheumatic mitral valve regurgitation 08/13/2020    Chronic systolic congestive heart failure ([address] 11/07/2019    Pulmonary edema cardiac cause ([address] 10/19/2019    COPD with acute exacerbation ([address] 10/19/2019    Respiratory failure requiring intubation ([address] 10/19/2019    Syncope 10/18/2019    Flash pulmonary edema ([address] 10/18/2019    Elevated d-dimer 10/18/2019    Acute respiratory failure with hypoxia ([facility] 10/18/2019    CAD (coronary artery disease) 10/18/2019    Ischemic cardiomyopathy 09/09/2019    Type 2 diabetes mellitus with hyperglycemia [facility] 08/26/2019    TESSA on CPAP 08/26/2019 Type 2 diabetes mellitus with hyperglycemia, with long-term current use of insulin ([facility] 07/12/2018    Severe obesity (BMI 35.0-39. 9) with comorbidity ([address] 07/12/2018    SOB (shortness of breath) 06/09/2018    Coronary artery disease involving native coronary artery with unstable angina pectoris ([address] 06/09/2018    Pulmonary edema 06/08/2018    Coronary artery disease involving native coronary artery of native heart without angina pectoris 05/31/2018    STEMI (ST elevation myocardial infarction) ([address] 05/20/2018    Acute pulmonary edema ([facility] [address] 05/20/2018    Caregiver stress 04/26/2017    Bilateral shoulder pain 09/27/2016    Elevated alkaline phosphatase level 09/27/2016    Chronic obstructive pulmonary disease ([facility] [address] 05/17/2016    Compliance with medication regimen 05/17/2016    Chronic pain of both knees 04/13/2016    Chronic pain syndrome 04/13/2016    Primary osteoarthritis of both knees 04/13/2016    Iron deficiency anemia 10/22/2015    Decreased GFR 10/22/2015    Dyslipidemia, goal LDL below 70 10/22/2015    Former smoker 10/22/2015    Osteoarthritis of both knees     Essential hypertension 09/17/2015    Environmental allergies 04/01/2015    Cardiomyopathy, dilated ([facility] [address] 12/31/2014        The  provided the following Interventions:  Initiated a relationship of care and support. Patient wasn't feeling well. Patient stated, \"I'm not feeling well. I have difficulty of breathing. \" Suggested if sitting upright would help but patient declined and preferred lying down with nasal cannula. Unable to explore issues of calixto, belief, spirituality and Adventism/ritual needs while hospitalized due to current state. Listened empathically. Provided chaplaincy education. Provided information about Spiritual Care Services. Offered prayer and assurance of continued prayers on patient's behalf. Chart reviewed.     The following outcomes where achieved:  Patient shared limited information about both his/her medical narrative and spiritual journey/beliefs.  confirmed Patient's [facility] Rastafari Affiliation. Patient processed feeling about current hospitalization. Patient expressed gratitude for 's visit. Assessment:  Patient does not have any Pentecostalism/cultural needs that will affect patients preferences in health care. There are no spiritual or Pentecostalism issues which require intervention at this time. Plan:  Chaplains will continue to follow and will provide pastoral care on an as needed/requested basis.  recommends bedside caregivers page  on duty if patient shows signs of acute spiritual or emotional distress.     Martin Mcgill [address]   [phone]

## 2023-01-15 NOTE — PROGRESS NOTES
Problem: Mobility Impaired (Adult and Pediatric)  Goal: *Acute Goals and Plan of Care (Insert Text)  Description: Physical Therapy Goals  Initiated 1/15/2023 and to be accomplished within 7 day(s)  1. Patient will move from supine to sit and sit to supine  in bed with modified independence. 2.  Patient will transfer from bed to chair and chair to bed with modified independence using the least restrictive device. 3.  Patient will perform sit to stand with modified independence. 4.  Patient will ambulate with modified independence for 100 feet with the least restrictive device. 5.  Patient will ascend/descend 3 stairs with (U) handrail(s) with modified independence. PLOF: Independent, occasionally used a SPC or RW if needed.  can assist.      Outcome: Progressing Towards Goal     PHYSICAL THERAPY EVALUATION    Patient: Neysa Meckel (68 y.o. female)  Date: 1/15/2023  Primary Diagnosis: COPD exacerbation [facility] [J44.1]       Precautions: Fall  PLOF: Independent    ASSESSMENT :  Based on the objective data described below, the patient presents with impaired functional mobility and decreased activity tolerance 2/2 admission for COPD exacerbation. Pt politely refused ambulating in hallway due to fear of increased SOB with increased distance. Pt ambulated within room and to bathroom, no symptomatic complaints. Pt reported she has been using the 2L NC when she returns from the bathroom sometimes due to SOB. Pt educated on importance of continued mobility and benefits of home health therapy to improve overall endurance, pt verbalized understanding but reported she previously had therapy at home and they were \"too pushy\" because she didn't want to walk too far. Pt educated on recommendation and that it was ultimately her decision. Patient will benefit from skilled intervention to address the above impairments.   Patient's rehabilitation potential is considered to be Good  Factors which may influence rehabilitation potential include:   []         None noted  []         Mental ability/status  [x]         Medical condition  []         Home/family situation and support systems  []         Safety awareness  []         Pain tolerance/management  []         Other:      PLAN :  Recommendations and Planned Interventions:   [x]           Bed Mobility Training             [x]    Neuromuscular Re-Education  [x]           Transfer Training                   []    Orthotic/Prosthetic Training  [x]           Gait Training                          []    Modalities  [x]           Therapeutic Exercises           []    Edema Management/Control  [x]           Therapeutic Activities            [x]    Family Training/Education  [x]           Patient Education  []           Other (comment):    Frequency/Duration: Patient will be followed by physical therapy 3-5 times a week to address goals. Further Equipment Recommendations for Discharge: pt has all necessary equipment    AMPAC: Current research shows that an AM-PAC score of 18 (14 without stairs) or greater is associated with a discharge to the patient's home setting. Based on an AM-PAC score of 21/24 (or **/20 if omitting stairs) and their current functional mobility deficits, it is recommended that the patient have 2-3 sessions per week of Physical Therapy at d/c to increase the patient's independence. This AMPAC score should be considered in conjunction with interdisciplinary team recommendations to determine the most appropriate discharge setting. Patient's social support, diagnosis, medical stability, and prior level of function should also be taken into consideration. SUBJECTIVE:   Patient stated \"I don't want 3 breathing treatments.     OBJECTIVE DATA SUMMARY:     Past Medical History:   Diagnosis Date    Abnormal WBC count 05/17/2016    Anemia     Bilateral shoulder pain 09/27/2016    Chondromalacia of both patellae     Chronic heart failure with preserved ejection fraction ([facility] 1/13/2023    COPD (chronic obstructive pulmonary disease) ([facility] 09/2015    mild-mod dz; Dr Nir Stuart    Diabetes [facility]) 2013    Diabetic eye exam ([facility] 2016    Dilated cardiomyopathy ([facility]     Dyslipidemia     Gout     [facility] 10/18/2019    Stented    History of echocardiogram 11/14/2014    Mild LVE. EF 40%. Mild, diffuse hypk. Mild LAE. Mild MR. Hypercholesteremia 01/08/2014    Hypertension 2000    Noncompliance with medications 02/16/2016    Obesity     Orthostatic hypotension 05/17/2016    Osteoarthritis of both knees     Pain management 04/01/2016    Dr. Teetee Champion     Popliteal cyst, bilateral      Sleep apnea     not using cpap    Stage 3b chronic kidney disease ([facility] 08/08/2022    Vitamin D deficiency 10/16/2014     Past Surgical History:   Procedure Laterality Date    HX HEART CATHETERIZATION  2019    Coronary stent    HX TUBAL LIGATION      IR INSERT TUNL CVC W/O PORT OVER 5 YR  09/21/2022     Barriers to Learning/Limitations: None  Compensate with: N/A  Home Situation:  Home Situation  Home Environment: Private residence  # Steps to Enter: 3  Rails to Enter: Yes  Hand Rails : Bilateral  One/Two Story Residence: One story  Living Alone: No  Support Systems: Spouse/Significant Other  Patient Expects to be Discharged to[de-identified] Home  Current DME Used/Available at Home: Cane, straight, Walker, rolling, Tub transfer bench, Commode, bedside  Tub or Shower Type: Tub/Shower combination  Critical Behavior:  Neurologic State: Alert  Orientation Level: Oriented X4  Cognition: Appropriate decision making; Appropriate for age attention/concentration; Appropriate safety awareness; Follows commands     Psychosocial  Patient Behaviors: Calm; Cooperative  Purposeful Interaction: Yes                 Strength:    Strength: Generally decreased, functional                    Tone & Sensation:                  Sensation: Intact               Range Of Motion:  AROM: Within functional limits PROM: Within functional limits           Posture:  Posture (WDL): Exceptions to WDL  Posture Assessment: Forward head;Rounded shoulders  Functional Mobility:  Transfers:  Sit to Stand: Stand-by assistance  Stand to Sit: Stand-by assistance  Balance:   Sitting: Intact  Standing: Impaired; Without support  Standing - Static: Good  Standing - Dynamic : Good  Ambulation/Gait Training:  Distance (ft): 20 Feet (ft)  Assistive Device:  (none)  Ambulation - Level of Assistance: Stand-by assistance  Gait Description (WDL): Exceptions to WDL  Speed/Venus: Slow  Pain:  Pain level pre-treatment: 0/10   Pain level post-treatment: 0/10   Pain Intervention(s) : Medication (see MAR); Rest, Ice, Repositioning  Response to intervention: Nurse notified, See doc flow    Activity Tolerance:   Fair, pt self-limited due to fear of SOB  Please refer to the flowsheet for vital signs taken during this treatment. After treatment:   []         Patient left in no apparent distress sitting up in chair  [x]         Patient left in no apparent distress in bed  [x]         Call bell left within reach  [x]         Nursing notified  []         Caregiver present  []         Bed alarm activated  []         SCDs applied    COMMUNICATION/EDUCATION:   [x]         Role of Physical Therapy in the acute care setting. [x]         Fall prevention education was provided and the patient/caregiver indicated understanding. [x]         Patient/family have participated as able in goal setting and plan of care. []         Patient/family agree to work toward stated goals and plan of care. []         Patient understands intent and goals of therapy, but is neutral about his/her participation. []         Patient is unable to participate in goal setting/plan of care: ongoing with therapy staff.  []         Other:     Thank you for this referral.  Beltran Florentino, PT   Time Calculation: 23 mins      Eval Complexity: History: LOW Complexity : Zero comorbidities / personal factors that will impact the outcome / POCExam:LOW Complexity : 1-2 Standardized tests and measures addressing body structure, function, activity limitation and / or participation in recreation  Presentation: LOW Complexity : Stable, uncomplicated  Clinical Decision Making:Low Complexity    Overall Complexity:LOW     [facility] AM-PAC® Basic Mobility Inpatient Short Form (6-Clicks) Version 2    How much HELP from another person does the patient currently need    (If the patient hasn't done an activity recently, how much help from another person do you think he/she would need if he/she tried?)   Total (Total A or Dep)   A Lot  (Mod to Max A)   A Little (Sup or Min A)   None (Mod I to I)   Turning from your back to your side while in a flat bed without using bedrails? [] 1 [] 2 [] 3 [x] 4   2. Moving from lying on your back to sitting on the side of a flat bed without using bedrails? [] 1 [] 2 [] 3 [x] 4   3. Moving to and from a bed to a chair (including a wheelchair)? [] 1 [] 2 [] 3 [x] 4   4. Standing up from a chair using your arms (e.g., wheelchair, or bedside chair)? [] 1 [] 2 [x] 3 [] 4   5. Walking in hospital room? [] 1 [] 2 [x] 3 [] 4   6. Climbing 3-5 steps with a railing?+   [] 1 [] 2 [x] 3 [] 4   +If stair climbing cannot be assessed, skip item #6. Sum responses from items 1-5. Current research shows that an AM-PAC score of 18 (14 without stairs) or greater is associated with a discharge to the patient's home setting. Based on an AM-PAC score of 21/24 (or **/20 if omitting stairs) and their current functional mobility deficits, it is recommended that the patient have 2-3 sessions per week of Physical Therapy at d/c to increase the patient's independence.

## 2023-01-15 NOTE — PROGRESS NOTES
RENAL DAILY PROGRESS NOTE              Subjective:       Complaint:   Overnight events noted  no nausea, vomiting, chest pain  Still feels wheezy but less tremulous and better from breathing standpoint    IMPRESSION:   IMPRESSION:   ESRD  Access: TDC,maturing av fistula on right  arm(placed 3 weeks ago-awaiting rpt procedure for superfilization)   COPD excerebration. CAD,cardiomyopathy  Secondary hyperparathyroidism  ACD   PLAN:   HD on schedule- TTS  C/w epogen  Phos is controlled. If patient need IV contrast for CT imaging, need to be coordinated with renal team.   Avoid Gadolinium due to its association with nephrogenic systemic fibrosis in a patients with severe ARF and ESRD. Please dose all medications for creatinine clearance <15/dialysis. Avoid blood pressure checks, blood draws, peripheral iv's on arm with access. AvoidPICC lines on either arm in order to preserve veins for dialysis access creation.                  Current Facility-Administered Medications   Medication Dose Route Frequency    traMADoL (ULTRAM) tablet 50 mg  50 mg Oral Q6H PRN    melatonin tablet 5 mg  5 mg Oral QHS    heparin (porcine) injection 5,000 Units  5,000 Units SubCUTAneous Q8H    albuterol/ipratropium (DUONEB) neb solution  1 Dose Nebulization Q6H RT    epoetin tonny-epbx (RETACRIT) injection 4,000 Units  4,000 Units SubCUTAneous Q TUE, THU & SAT    methylPREDNISolone (PF) (SOLU-MEDROL) injection 40 mg  40 mg IntraVENous Q8H    aspirin chewable tablet 162 mg  162 mg Oral DAILY    atorvastatin (LIPITOR) tablet 80 mg  80 mg Oral QHS    ezetimibe (ZETIA) tablet 10 mg  10 mg Oral DAILY    gabapentin (NEURONTIN) capsule 400 mg  400 mg Oral DAILY    sodium chloride (NS) flush 5-40 mL  5-40 mL IntraVENous Q8H    sodium chloride (NS) flush 5-40 mL  5-40 mL IntraVENous PRN    acetaminophen (TYLENOL) tablet 650 mg  650 mg Oral Q6H PRN    Or    acetaminophen (TYLENOL) suppository 650 mg  650 mg Rectal Q6H PRN polyethylene glycol (MIRALAX) packet 17 g  17 g Oral DAILY PRN    bisacodyL (DULCOLAX) suppository 10 mg  10 mg Rectal DAILY PRN    ondansetron (ZOFRAN ODT) tablet 4 mg  4 mg Oral Q8H PRN    Or    ondansetron (ZOFRAN) injection 4 mg  4 mg IntraVENous Q6H PRN    arformoteroL (BROVANA) neb solution 15 mcg  15 mcg Nebulization BID RT    budesonide (PULMICORT) 500 mcg/2 ml nebulizer suspension  500 mcg Nebulization BID RT    azithromycin (ZITHROMAX) 500 mg in 0.9% sodium chloride 250 mL (VIAL-MATE)  500 mg IntraVENous Q24H    metoprolol tartrate (LOPRESSOR) tablet 25 mg  25 mg Oral BID    insulin lispro (HUMALOG) injection   SubCUTAneous AC&HS    glucose chewable tablet 16 g  4 Tablet Oral PRN    glucagon (GLUCAGEN) injection 1 mg  1 mg IntraMUSCular PRN    dextrose 10% infusion 0-250 mL  0-250 mL IntraVENous PRN    insulin glargine (LANTUS) injection 5 Units  5 Units SubCUTAneous QHS    guaiFENesin ER (MUCINEX) tablet 600 mg  600 mg Oral Q12H       Review of Symptoms: comprehensive ROS negative except above.    Objective:   Patient Vitals for the past 24 hrs:   Temp Pulse Resp BP SpO2   01/15/23 0921 -- -- -- -- 100 %   01/15/23 0737 97.3 °F (36.3 °C) 84 20 (!) 149/71 95 %   01/15/23 0350 98.7 °F (37.1 °C) 85 18 (!) 153/75 96 %   01/14/23 2335 98.8 °F (37.1 °C) 86 16 121/67 96 %   01/14/23 2035 -- -- -- -- 95 %   01/14/23 1949 98.5 °F (36.9 °C) 97 18 (!) 144/75 94 %   01/14/23 1551 98.1 °F (36.7 °C) (!) 101 18 (!) 147/74 96 %   01/14/23 1310 97.3 °F (36.3 °C) 85 18 (!) 142/71 --   01/14/23 1300 -- 83 18 (!) 168/71 --   01/14/23 1245 -- 81 18 (!) 149/66 --        Weight change:      01/13 1901 - 01/15 0700  In: 480 [P.O.:480]  Out: 2900     Intake/Output Summary (Last 24 hours) at 1/15/2023 1231  Last data filed at 1/14/2023 1401  Gross per 24 hour   Intake 240 ml   Output 2900 ml   Net -2660 ml     Physical Exam:   General: comfortable, no acute distress   HEENT sclera anicteric, supple neck, no thyromegaly  CVS: S1S2 heard,  no rub  RS: + air entry b/l, wheezing  Abd: Soft, Non tender, Not distended, Positive bowel sounds, no organomegaly, no CVA / supra pubic tenderness  Neuro: non focal, awake, alert , CN II-XII are grossly intact  Extrm: edema, no cyanosis, clubbing   Skin: no visible  Rash  Musculoskeletal: No gross joints or bone deformities   Access: maturing right arm access      Data Review:     LABS:   Hematology:   Recent Labs     01/15/23  0210 01/14/23  0704 01/13/23  0955   WBC 12.3 8.8 6.1   HGB 10.5* 9.7* 10.8*   HCT 33.8* 31.4* 35.0     Chemistry:   Recent Labs     01/15/23  0210 01/14/23  0704 01/13/23  0955   BUN 43* 35* 23*   CREA 2.26* 2.05* 1.58*   CA 9.5 9.0 9.6   ALB 3.6 3.5  --    K 4.4 4.4 4.6    137 139    105 104   CO2 27 22 26   PHOS 3.4 3.7  --    * 251* 153*            Procedures/imaging: see electronic medical records for all procedures, Xrays and details which were not copied into this note but were reviewed prior to creation of Plan          Assessment & Plan:       See above      James Gastelum MD  1/15/2023  12:31 PM

## 2023-01-15 NOTE — CONSULTS
[facility]  Pulmonary, Critical Care, and Sleep Medicine    Name: Neysa Meckel MRN: [id number]   : 1950 Hospital: [facility]   Date: 1/15/2023        Pulmonary -Critical Care: Initial Patient Consult    Admission Date:   2023  LOS: 2  MAR reviewed and pertinent medications noted or modified as needed    IMPRESSION:   Acute hypoxic respiratory failure - Improving  Wheezing  - COPD and may have component of  \"cardiac asthma\" in the setting of chronic systolic/diastolic heart failure, favor the latter Echo from  with EF 45% and mild to moderate mitral regurgitation. CXR per my review notes some vascular congestion at bases RIGHT > LEFT  Dysphonia: Persistent Upper airway sounds: Possible soft tissue and/or Vocal Cord Dysfunction  COPD: Small cystic changes noted on CT Chest 2022. No large blebs. PFT in 2017 consistent with restrictive lung disease. Patient does have prior smoking history and ongoing 2nd hand smoke exposure. Hypertension: CLVH on Echo  CHF: HFpEF: Diastolic dysfunction noted on echo  TESSA  Was previously followed by Dr. Pj Sharp to follow up and has not been seen for an extended period of time. DM 2  ESRD- HD dependent  CAD/History of STEMI in 2018 requiring intubation and mechanical ventilation  Former smoker 46 PY, quit in 2019, + 2nd hand smoke exposure from   Chronic pain syndrome  Morbid Obesity Body mass index is 41.81 kg/m²  H/O COVID Infection . H/O Abnormal CT Chest Imagin2022- diffuse asymmetric GGOs and atelectasis- most likely from pulmonary edema- Resolved on CT Chest 22         RECOMMENDATIONS:     PULM:  Hold OP Anoro  Continue with Brovana/ Pulmicort Q12 hrs  Change Duoneb to PRN  Continue with Solumedrol 40mg Q 8 hrs- May decrease tomorrow  Continue PRN albuterol   If upper airway sounds over VC/neck region persist.  Patient also ex-smoker- consider ENT evaluation of VC with NP scope.  Patient's upper airway sounds may be due to redundant soft tissue in neck vs vocal cord dysfunction  Maintain aspiration precautions: HOB >30 degrees  SpO2 goal >92%  Trial of QHS BIPAP  AVOID Over oxygenation  Bronchial /oral hygiene; IS at bedside please  Monitor I&Os  Follow up on pending cultures  Streamline antibiotics per culture data and clinical course  HD per nephrology  Upper Extremity Dopplers are ordered and pending at this time  Electrolyte management, prophylaxis and glycemic control per primary and respective consultants  OP Pulmonary- Sleep follow up  Will continue to follow      CODE STATUS: Full Code        Subjective/History: This patient has been seen and evaluated at the request of Dr. Keya Lieberman for COPD exacerbation. Patient is a 81 y.o. female  PMHx of ESRD on HD, COPD not on home O2, CAD, DM, HTN, HFpEF. The patient has been seen by our group as an in-patient. She has not followed up with any Pulmonologist as an OP. The patient states she did not realized she needed to follow up with Pulmonary but that she will in the future. The patient reports increasing shortness of breath and wheezing over the past week- more significant after her HD session. She reports that she had to cut her run 15 minutes early so she could get her medial ride. When she got home she became more short of breath with wheeling. She also noted nausea without emesis. No known sick contacts. No fever or chills. No recurrent nausea. No emesis,  No chest pain    She continues to refrain from smoking, but her  does smoke with in and out side the home. Inhalers: Anoro and PRN albuterol nebs    Respiratory Triggers: smoke and cold air    Pets:  2 has 4: small dogs: chihuahua -poodle mixes- they do not sleep with patient and have their own room    Home: No known water leaks, no bug infestations    Obstructive Sleep Apnea (TESSA): The patient has history of TESSA. She was previously followed by Dr. Guru Magallon.   She reports that she had a recalled [employer] PAP device. Her DME was [facility]. She reports that she did register her device with [employer], but never got a replacement. The patient stopped using her device and actually brought her device back to [facility]. She has not used PAP therapy for an extended period of time. She is HD dependant. She denies any diet indiscretions. The patient had ID earlier today in our HD unit. She has a THDC in her right upper chest wall. She had a AVF created a few weeks ago and is not ready for use      The patient reports that she is feeling much better today. Her dyspnea at rest is resolve but she still has PIERSON walking from bed to bathroom which is not her baseline      Interval Evaluation:   01/15/23  LOS: 2    Sitting up in bed  Reports feeling the same as yesterday- still with wheezing  Return of some intermittent nausea but no emesis  Reports feeling jittery with albuterol  No chest pain  No hemoptysis  The patient states that she feels her vocal quality is not as good. She denies voice cutting out but it is not as strong and she also notes some difficulty with word finding so it takes her longer to express what she wishes to convey.    + nasal dryness and some small blood cots in nares earlier today  Nephrology note reviewed- no plans for additional HD at this time- request for Phos level in am                   Inpat Anti-Infectives (From admission, onward)       Start     Ordered Stop    01/13/23 1724  azithromycin (ZITHROMAX) 500 mg in 0.9% sodium chloride 250 mL (VIAL-MATE)  500 mg,   IntraVENous,   EVERY 24 HOURS         01/13/23 1723 --             Patient Vitals for the past 24 hrs:   Temp Pulse Resp BP SpO2   01/15/23 1551 98.1 °F (36.7 °C) 82 19 132/76 96 %   01/15/23 1127 97.7 °F (36.5 °C) 74 17 136/75 100 %   01/15/23 0921 -- -- -- -- 100 %   01/15/23 0737 97.3 °F (36.3 °C) 84 20 (!) 149/71 95 %   01/15/23 0350 98.7 °F (37.1 °C) 85 18 (!) 153/75 96 %   01/14/23 2335 98.8 °F (37.1 °C) 86 16 121/67 96 %   01/14/23 2035 -- -- -- -- 95 %   01/14/23 1949 98.5 °F (36.9 °C) 97 18 (!) 144/75 94 %             Past Medical History:   Diagnosis Date    Abnormal WBC count 05/17/2016    Anemia     Bilateral shoulder pain 09/27/2016    Chondromalacia of both patellae     Chronic heart failure with preserved ejection fraction ([facility] [facility] 1/13/2023    COPD (chronic obstructive pulmonary disease) ([facility] [address] 09/2015    mild-mod dz; Dr Yaneli Luong    Diabetes [facility]) 2013    Diabetic eye exam ([facility] [facility] 2016    Dilated cardiomyopathy ([facility] [place] [facility]     Dyslipidemia     Gout     Heart attack ([facility] [place] [facility] 10/18/2019    Stented    History of echocardiogram 11/14/2014    Mild LVE. EF 40%. Mild, diffuse hypk. Mild LAE. Mild MR. Hypercholesteremia 01/08/2014    Hypertension 2000    Noncompliance with medications 02/16/2016    Obesity     Orthostatic hypotension 05/17/2016    Osteoarthritis of both knees     Pain management 04/01/2016    Dr. Wells Few     Popliteal cyst, bilateral      Sleep apnea     not using cpap    Stage 3b chronic kidney disease ([facility] [place] [facility] 08/08/2022    Vitamin D deficiency 10/16/2014      Past Surgical History:   Procedure Laterality Date    HX HEART CATHETERIZATION  2019    Coronary stent    HX TUBAL LIGATION      IR INSERT TUNL CVC W/O PORT OVER 5 YR  09/21/2022      Prior to Admission medications    Medication Sig Start Date End Date Taking? Authorizing Provider   albuterol (PROVENTIL HFA, VENTOLIN HFA, PROAIR HFA) 90 mcg/actuation inhaler  1/9/23   Provider, Historical   atorvastatin (LIPITOR) 80 mg tablet Take 80 mg by mouth nightly. 1/9/23   Provider, Historical   OneTouch Ultra Test strip USE TO TEST BLOOD SUGAR TWICE DAILY 1/9/23   Provider, Historical   ezetimibe (ZETIA) 10 mg tablet Take 10 mg by mouth daily. 1/9/23   Provider, Historical   hydrALAZINE (APRESOLINE) 100 mg tablet Take 100 mg by mouth two (2) times a day.  10/31/22   Provider, Historical   tiZANidine (ZANAFLEX) 4 mg tablet Take 4 mg by mouth two (2) times daily as needed. 11/14/22   Provider, Una   metoprolol tartrate (LOPRESSOR) 25 mg tablet Take 1 Tablet by mouth two (2) times a day. 10/8/22   Yanira Grimaldo MD   insulin detemir U-100 (Levemir U-100 Insulin) 100 unit/mL injection 15 units daily for diabetes once daily with lunch 9/22/22   Yaritza Kong MD   gabapentin (NEURONTIN) 400 mg capsule Take 400 mg by mouth in the morning. Morales, MD Tg   albuterol-ipratropium (DUO-NEB) 2.5 mg-0.5 mg/3 ml nebu 3 mL by Nebulization route every six (6) hours as needed for Wheezing. 3/23/21   Dani Martinez MD   aspirin 81 mg chewable tablet Take 2 Tabs by mouth daily. 7/19/19   Kendra Rabago NP   umeclidinium-vilanterol (ANORO ELLIPTA) 62.5-25 mcg/actuation inhaler Take 1 Puff by inhalation daily.  For COPD 4/26/17   Fanny Guy DNP     Current Facility-Administered Medications   Medication Dose Route Frequency    ipratropium (ATROVENT) 0.02 % nebulizer solution 0.5 mg  0.5 mg Nebulization Q6H RT    melatonin tablet 5 mg  5 mg Oral QHS    heparin (porcine) injection 5,000 Units  5,000 Units SubCUTAneous Q8H    epoetin tonny-epbx (RETACRIT) injection 4,000 Units  4,000 Units SubCUTAneous Q TUE, THU & SAT    methylPREDNISolone (PF) (SOLU-MEDROL) injection 40 mg  40 mg IntraVENous Q8H    aspirin chewable tablet 162 mg  162 mg Oral DAILY    atorvastatin (LIPITOR) tablet 80 mg  80 mg Oral QHS    ezetimibe (ZETIA) tablet 10 mg  10 mg Oral DAILY    gabapentin (NEURONTIN) capsule 400 mg  400 mg Oral DAILY    sodium chloride (NS) flush 5-40 mL  5-40 mL IntraVENous Q8H    arformoteroL (BROVANA) neb solution 15 mcg  15 mcg Nebulization BID RT    budesonide (PULMICORT) 500 mcg/2 ml nebulizer suspension  500 mcg Nebulization BID RT    azithromycin (ZITHROMAX) 500 mg in 0.9% sodium chloride 250 mL (VIAL-MATE)  500 mg IntraVENous Q24H    metoprolol tartrate (LOPRESSOR) tablet 25 mg  25 mg Oral BID    insulin lispro (HUMALOG) injection SubCUTAneous AC&HS    insulin glargine (LANTUS) injection 5 Units  5 Units SubCUTAneous QHS    guaiFENesin ER (MUCINEX) tablet 600 mg  600 mg Oral Q12H     No Known Allergies   Social History     Tobacco Use    Smoking status: Former     Packs/day: 1.00     Years: 52.00     Pack years: 52.00     Types: Cigarettes     Quit date: 2019     Years since quitting: 3.6    Smokeless tobacco: Never   Substance Use Topics    Alcohol use: No     Alcohol/week: 0.0 standard drinks      Family History   Problem Relation Age of Onset    Diabetes Mother     Hypertension Mother     Hypertension Father     Kidney Disease Maternal Aunt 50        Dialysis        Review of Systems:  A comprehensive review of systems was negative except for that written in the HPI. Objective:   Vital Signs:    Visit Vitals  /76   Pulse 82   Temp 98.1 °F (36.7 °C)   Resp 19   Ht 5' 4\" (1.626 m)   Wt 96.6 kg (213 lb)   SpO2 96%   Breastfeeding Unknown   BMI 36.56 kg/m²       O2 Device: Nasal cannula, None (Room air)   O2 Flow Rate (L/min): 2 l/min   Temp (24hrs), Av.2 °F (36.8 °C), Min:97.3 °F (36.3 °C), Max:98.8 °F (37.1 °C)       Intake/Output:   Last shift:      01/15 0701 - 01/15 1900  In: 480 [P.O.:480]  Out: -   Last 3 shifts:  190 - 01/15 0700  In: 480 [P.O.:480]  Out: 2900     Intake/Output Summary (Last 24 hours) at 1/15/2023 1740  Last data filed at 1/15/2023 1302  Gross per 24 hour   Intake 480 ml   Output --   Net 480 ml            Physical Exam:    General:  Alert, cooperative, no distress, appears stated age + Obese body habitus   Head:  Normocephalic, without obvious abnormality, atraumatic. Eyes:  Conjunctivae/corneas clear. PERRL, EOMs intact. Nose: Nares normal. Septum midline. Mucosa normal. No drainage or sinus tenderness. Throat: Lips, mucosa, and tongue normal. No teeth   Neck: Supple, symmetrical, trachea midline, no adenopathy, thyroid: no enlargment/tenderness/nodules, no carotid bruit and no JVD. Back:   Symmetric,   Lungs:   Bilateral breath sounds present. Diffuse fine- end-expiratory wheezing - worse when breathing through her mouth- decreased when breathing through her nose. Some upper air-way sounds also heard- no not stridor, and more intense today   Chest wall:  THDC right upper chest wall. No tenderness or deformity.- No crepitus   Heart:  Regular rate and rhythm, S1, S2 normal, no murmur, click, rub or gallop. Abdomen:   Soft, non-tender. Bowel sounds normal. No masses,  No organomegaly. Extremities: Extremities normal, atraumatic, no cyanosis or edema. - unmatured AFV right arm- + palpable thrill   Pulses: 2+ and symmetric all extremities. Skin: Skin color, texture, turgor normal. No rashes or lesions   Lymph nodes:      Cervical, supraclavicular nodes: normal on palpation   Neurologic: Grossly nonfocal       Data:     Recent Labs     01/15/23  0210 01/14/23  0704 01/13/23  0955   WBC 12.3 8.8 6.1   HGB 10.5* 9.7* 10.8*   HCT 33.8* 31.4* 35.0    289 292        Latest Reference Range & Units 12/9/22 12:48 12/10/22 04:27 12/11/22 02:46 12/12/22 04:35 12/13/22 01:46 1/13/23 09:55   EOSINOPHILS 0 - 5 % 3 3 2 2 2 1   ABS.  EOSINOPHILS 0.0 - 0.4 K/UL 0.1 0.1 0.1 0.1 0.1 0.0         Recent Labs     01/15/23  0210 01/14/23  0704 01/13/23  0955    137 139   K 4.4 4.4 4.6    105 104   CO2 27 22 26   * 251* 153*   BUN 43* 35* 23*   CREA 2.26* 2.05* 1.58*   CA 9.5 9.0 9.6   PHOS 3.4 3.7  --    ALB 3.6 3.5  --        Lab Results   Component Value Date/Time    NT pro-BNP 1,057 (H) 10/08/2022 05:15 PM    NT pro-BNP 2,013 (H) 09/09/2022 07:34 AM    NT pro-BNP 1,871 (H) 08/08/2022 01:48 AM    NT pro-BNP 3,952 (H) 07/08/2022 09:53 AM    NT pro-BNP 1,020 (H) 03/18/2021 01:29 AM     Lab Results   Component Value Date/Time    INR 1.0 09/09/2022 07:54 AM    INR 1.0 06/27/2022 11:45 PM    INR 1.3 (H) 03/19/2021 04:00 AM    INR 1.0 10/19/2019 01:25 AM    INR 1.0 06/08/2019 04:35 PM Prothrombin time 13.5 09/09/2022 07:54 AM    Prothrombin time 13.9 06/27/2022 11:45 PM    Prothrombin time 16.0 (H) 03/19/2021 04:00 AM    Prothrombin time 13.2 10/19/2019 01:25 AM    Prothrombin time 13.2 06/08/2019 04:35 PM       No results for input(s): PH, PCO2, PO2, HCO3, FIO2 in the last 72 hours. No results for input(s): FIO2I, IFO2, HCO3I, IHCO3, HCOPOC, PCO2I, PCOPOC, IPHI, PHI, PHPOC, PO2I, PO2POC in the last 72 hours.     No lab exists for component: IPOC2    ENDO:  Lab Results   Component Value Date/Time    TSH 1.29 10/08/2022 05:15 PM    Triiodothyronine (T3), free 3.2 02/09/2016 08:47 AM    T4, Free 1.5 10/08/2022 05:15 PM       Lab Results   Component Value Date/Time    Glucose 224 (H) 01/15/2023 02:10 AM    Glucose 251 (H) 01/14/2023 07:04 AM    Glucose 153 (H) 01/13/2023 09:55 AM    Glucose 166 (H) 12/23/2022 07:07 AM    Glucose 154 (H) 12/13/2022 01:46 AM     MICRO:  Results       Procedure Component Value Units Date/Time    RESPIRATORY VIRUS PANEL W/COVID-19, PCR [[id number]] Collected: 01/13/23 1737    Order Status: Completed Specimen: Nasopharyngeal Updated: 01/13/23 1858     Adenovirus Not detected        Coronavirus 229E Not detected        Coronavirus HKU1 Not detected        Coronavirus CVNL63 Not detected        Coronavirus OC43 Not detected        SARS-CoV-2, PCR Not detected        Metapneumovirus Not detected        Rhinovirus and Enterovirus Not detected        Influenza A Not detected        Influenza A, subtype H1 Not detected        Influenza A, subtype H3 Not detected        INFLUENZA A H1N1 PCR Not detected        Influenza B Not detected        Parainfluenza 1 Not detected        Parainfluenza 2 Not detected        Parainfluenza 3 Not detected        Parainfluenza virus 4 Not detected        RSV by PCR Not detected        B. parapertussis, PCR Not detected        Bordetella pertussis - PCR Not detected        Chlamydophila pneumoniae DNA, QL, PCR Not detected        Mycoplasma pneumoniae DNA, QL, PCR Not detected                 CARDIO:    EKG Results       Procedure 720 Value Units Date/Time    EKG, 12 LEAD, INITIAL [[id number]] Collected: 01/13/23 0942    Order Status: Completed Updated: 01/13/23 1856     Ventricular Rate 98 BPM      Atrial Rate 98 BPM      P-R Interval 160 ms      QRS Duration 78 ms      Q-T Interval 360 ms      QTC Calculation (Bezet) 459 ms      Calculated P Axis 74 degrees      Calculated R Axis 20 degrees      Calculated T Axis 86 degrees      Diagnosis --     Normal sinus rhythm  Left atrial enlargement  Minimal voltage criteria for LVH, may be normal variant ( Dave product )  Borderline ECG  When compared with ECG of 08-DEC-2022 12:19,  No significant change was found  Confirmed by Jasvir Abraham ([id number]) on 1/13/2023 6:56:19 PM              06/25/22    ECHO ADULT COMPLETE 06/29/2022 6/29/2022    Interpretation Summary    Left Ventricle: Mildly reduced left ventricular systolic function with a visually estimated EF of 50 - 55%. Left ventricle is mildly dilated. Increased wall thickness. Findings consistent with mild concentric hypertrophy. See diagram for wall motion findings. Diastolic dysfunction present with normal LV EF. Mitral Valve: Mildly thickened leaflet. Mild regurgitation with a centrally directed jet.     Signed by: Haydee Lopes MD on 6/29/2022 12:12 PM      MEDS: Please also see MAR  Current Facility-Administered Medications   Medication Dose Route Frequency    ipratropium (ATROVENT) 0.02 % nebulizer solution 0.5 mg  0.5 mg Nebulization Q6H RT    melatonin tablet 5 mg  5 mg Oral QHS    heparin (porcine) injection 5,000 Units  5,000 Units SubCUTAneous Q8H    epoetin tonny-epbx (RETACRIT) injection 4,000 Units  4,000 Units SubCUTAneous Q TUE, THU & SAT    methylPREDNISolone (PF) (SOLU-MEDROL) injection 40 mg  40 mg IntraVENous Q8H    aspirin chewable tablet 162 mg  162 mg Oral DAILY    atorvastatin (LIPITOR) tablet 80 mg  80 mg Oral QHS ezetimibe (ZETIA) tablet 10 mg  10 mg Oral DAILY    gabapentin (NEURONTIN) capsule 400 mg  400 mg Oral DAILY    sodium chloride (NS) flush 5-40 mL  5-40 mL IntraVENous Q8H    arformoteroL (BROVANA) neb solution 15 mcg  15 mcg Nebulization BID RT    budesonide (PULMICORT) 500 mcg/2 ml nebulizer suspension  500 mcg Nebulization BID RT    azithromycin (ZITHROMAX) 500 mg in 0.9% sodium chloride 250 mL (VIAL-MATE)  500 mg IntraVENous Q24H    metoprolol tartrate (LOPRESSOR) tablet 25 mg  25 mg Oral BID    insulin lispro (HUMALOG) injection   SubCUTAneous AC&HS    insulin glargine (LANTUS) injection 5 Units  5 Units SubCUTAneous QHS    guaiFENesin ER (MUCINEX) tablet 600 mg  600 mg Oral Q12H               Imaging:  I have personally reviewed the patients radiographs and have reviewed the reports:                  CXR Results  (Last 48 hours)      None              CT Results  (Last 48 hours)      None        CT- Chest:  9/20/22  IMPRESSION     1. Interval resolution of extensive bilateral lung airspace disease. 2.  Interval resolved small bilateral pleural effusions and mild cardiomegaly. 3.  Interval resolved lymphadenopathy. 4.  Chronic interstitial reticular and cystic changes of the lungs appear  stable. Thank you for your referral.            Complex decision making was made in the evaluation and management plans during this consultation. More than 50% of time was spent in counseling and coordination of care including review of data and discussion with other team members.           Macario Lopez DO, [place] [id number] [facility]  Pulmonary, Critical Care, and Sleep Medicine

## 2023-01-15 NOTE — PROGRESS NOTES
Occupational Therapy Goals  Initiated 1/15/2023 within 7 day(s). 1.  Patient will perform lower body dressing with modified independence and no increased SOB  2. Patient will perform bathing with modified independence and no increased SOB  3. Patient will maneuver on and off BSC with modified independence and no increased SOB  7. Patient will demonstrate independence with upper trunk alignment, pacing and modified proper breathing techniques in preparation for her efficient completion of her self care routine    Prior Level of Function: she reports she is independent with her self care and has significant SOB and difficulty with task completion. She lives with her  who assists her with her IADL's    OCCUPATIONAL THERAPY EVALUATION    Patient: Lesvia Lugo (74 y.o. female)  Date: 1/15/2023  Primary Diagnosis: COPD exacerbation ([facility] [address] [J44.1]       Precautions:   Fall  PLOF: she reports she is independent with her self care and has significant SOB and difficulty with task completion. She lives with her  who assists her with her IADL's    ASSESSMENT :  Based on the objective data described below, the patient presents with SOB during all functional activities and has poor energy conservation strategies in order to compensate for this. Therefore, she requires assistance for her self care and her functional mobility is limited    Patient will benefit from skilled intervention to address the above impairments.   Patient's rehabilitation potential is considered to be Good  Factors which may influence rehabilitation potential include:   []             None noted  []             Mental ability/status  [x]             Medical condition  []             Home/family situation and support systems  [x]             Safety awareness  []             Pain tolerance/management  []             Other:      PLAN :  Recommendations and Planned Interventions:   [x]               Self Care Training                  [x] Therapeutic Activities  []               Functional Mobility Training   []      Cognitive Retraining  [x]               Therapeutic Exercises           []      Endurance Activities  []               Balance Training                    []      Neuromuscular Re-Education  []               Visual/Perceptual Training     [x]      Home Safety Training  [x]               Patient Education                   [x]      Family Training/Education  [x]               Other (comment): energy conservation training    Frequency/Duration: Patient will be followed by occupational therapy 1-2 times per day/4-7 days per week to address goals. Further Equipment Recommendations for Discharge: N/A    AMPAC: 21/24    Current research shows that an AM-PAC score of 18 or greater is associated with a discharge to the patient's home setting. Based on an AM-PAC score of 21/24 and their current ADL deficits; it is recommended that the patient have 2-3 sessions per week of Occupational Therapy at d/c to increase the patient's independence. This AMPAC score should be considered in conjunction with interdisciplinary team recommendations to determine the most appropriate discharge setting. Patient's social support, diagnosis, medical stability, and prior level of function should also be taken into consideration. SUBJECTIVE:   Patient stated I do pretty well, I think. I just get short of breath.     OBJECTIVE DATA SUMMARY:     Past Medical History:   Diagnosis Date    Abnormal WBC count 05/17/2016    Anemia     Bilateral shoulder pain 09/27/2016    Chondromalacia of both patellae     Chronic heart failure with preserved ejection fraction ([address] 1/13/2023    COPD (chronic obstructive pulmonary disease) ([address] 09/2015    mild-mod dz; Dr Arturo Pires    Diabetes [facility]) 2013    Diabetic eye exam ([address] 2016    Dilated cardiomyopathy ([address]     Dyslipidemia     Gout     Heart attack ([address] 10/18/2019    Stented    History of echocardiogram 11/14/2014 Mild LVE. EF 40%. Mild, diffuse hypk. Mild LAE. Mild MR. Hypercholesteremia 01/08/2014    Hypertension 2000    Noncompliance with medications 02/16/2016    Obesity     Orthostatic hypotension 05/17/2016    Osteoarthritis of both knees     Pain management 04/01/2016    Dr. Angela Swanson     Popliteal cyst, bilateral      Sleep apnea     not using cpap    Stage 3b chronic kidney disease ([facility] [address] 08/08/2022    Vitamin D deficiency 10/16/2014     Past Surgical History:   Procedure Laterality Date    HX HEART CATHETERIZATION  2019    Coronary stent    HX TUBAL LIGATION      IR INSERT TUNL CVC W/O PORT OVER 5 YR  09/21/2022     Barriers to Learning/Limitations: yes;  emotional  Compensate with: therapeutic use of self  Home Situation:   Home Situation  Home Environment: Private residence  # Steps to Enter: 3  Rails to Enter: Yes  Hand Rails : Bilateral  One/Two Story Residence: One story  Living Alone: No  Support Systems: Spouse/Significant Other  Patient Expects to be Discharged to[de-identified] Home  Current DME Used/Available at Home: Cane, straight, Walker, rolling, Tub transfer bench, Commode, bedside  Tub or Shower Type: Tub/Shower combination  [x]  Right hand dominant   []  Left hand dominant    Cognitive/Behavioral Status:  Neurologic State: Alert;Eyes open spontaneously  Orientation Level: Oriented X3  Cognition: Follows commands       Skin: no skin integrity issues noted during OT session  Edema: no UE edema noted    Vision/Perceptual:      Tracking is WFL    Coordination: BUE  Coordination: Within functional limits    Balance:  Sitting: Intact  Standing: Impaired; Without support  Standing - Static: Good  Standing - Dynamic : Good    Strength: BUE  4-/5    Tone & Sensation: BUE  WFL       Range of Motion: BUE  AROM WFL  Functional Mobility and Transfers for ADLs:  Bed Mobility:   Mod I (additional time)   Transfers:  Sit to Stand: Stand-by assistance  Stand to Sit: Stand-by assistance     ADL Assessment:    Self feeding: independent  Grooming: independent  UB bathing/dressing: independent  LB bathing and dressing: min to mod assist assist secondary to SOB  ADL Intervention:   She was instructed on pacing, modified proper breathing and upper trunk alignment. She presented with independent return demonstration following review X 3 and she reports she can see the benefit and that she will follow through      Therapeutic Exercise:  Scapular strengthening and alignment    Pain:  Pain level pre-treatment: 0/10   Pain level post-treatment: 0/10     Activity Tolerance:   SOB with all activity as noted above  Please refer to the flowsheet for vital signs taken during this treatment. After treatment:   [] Patient left in no apparent distress sitting up in chair  [x] Patient left in no apparent distress in bed  [x] Call bell left within reach  [] Nursing notified  [] Caregiver present  [] Bed alarm activated    COMMUNICATION/EDUCATION:   [x] Role of Occupational Therapy in the acute care setting  [] Home safety education was provided and the patient/caregiver indicated understanding. [] Patient/family have participated as able in goal setting and plan of care. [x] Patient/family agree to work toward stated goals and plan of care. [] Patient understands intent and goals of therapy, but is neutral about his/her participation. [] Patient is unable to participate in goal setting and plan of care. Thank you for this referral.  Eulice Claude, OTR/L  Time Calculation: 24 mins    Eval Complexity: History: LOW Complexity : Brief history review ; Examination: LOW Complexity : 1-3 performance deficits relating to physical, cognitive , or psychosocial skils that result in activity limitations and / or participation restrictions ;    Decision Making:LOW Complexity : No comorbidities that affect functional and no verbal or physical assistance needed to complete eval tasks     [employer] AM-PAC® Daily Activity Inpatient Short Form (6-Clicks)*    How much HELP from another person does the patient currently need    (If the patient hasn't done an activity recently, how much help from another person do you think he/she would need if he/she tried?)   Total (Total A or Dep)   A Lot  (Mod to Max A)   A Little (Sup or Min A)   None (Mod I to I)   Putting on and taking off regular lower body clothing? [] 1 [] 2 [x] 3 [] 4   2. Bathing (including washing, rinsing,      drying)? [] 1 [] 2 [x] 3 [] 4   3. Toileting, which includes using toilet, bedpan or urinal?   [] 1 [] 2 [x] 3 [] 4   4. Putting on and taking off regular upper body clothing? [] 1 [] 2 [] 3 [x] 4   5. Taking care of personal grooming such as brushing teeth? [] 1 [] 2 [] 3 [x] 4   6. Eating meals? [] 1 [] 2 [] 3 [x] 4       Current research shows that an AM-PAC score of 18 or greater is associated with a discharge to the patient's home setting. Based on an AM-PAC score of 21/24 and their current ADL deficits; it is recommended that the patient have 2-3 sessions per week of Occupational Therapy at d/c to increase the patient's independence.

## 2023-01-16 LAB
ALBUMIN SERPL-MCNC: 3.3 G/DL (ref 3.4–5)
ANION GAP SERPL CALC-SCNC: 11 MMOL/L (ref 3–18)
BUN SERPL-MCNC: 80 MG/DL (ref 7–18)
BUN/CREAT SERPL: 26 (ref 12–20)
CALCIUM SERPL-MCNC: 8.8 MG/DL (ref 8.5–10.1)
CHLORIDE SERPL-SCNC: 101 MMOL/L (ref 100–111)
CO2 SERPL-SCNC: 23 MMOL/L (ref 21–32)
CREAT SERPL-MCNC: 3.03 MG/DL (ref 0.6–1.3)
ERYTHROCYTE [DISTWIDTH] IN BLOOD BY AUTOMATED COUNT: 18.5 % (ref 11.6–14.5)
GLUCOSE BLD STRIP.AUTO-MCNC: 208 MG/DL (ref 70–110)
GLUCOSE BLD STRIP.AUTO-MCNC: 217 MG/DL (ref 70–110)
GLUCOSE BLD STRIP.AUTO-MCNC: 242 MG/DL (ref 70–110)
GLUCOSE BLD STRIP.AUTO-MCNC: 273 MG/DL (ref 70–110)
GLUCOSE SERPL-MCNC: 205 MG/DL (ref 74–99)
HCT VFR BLD AUTO: 31.6 % (ref 35–45)
HGB BLD-MCNC: 9.6 G/DL (ref 12–16)
MCH RBC QN AUTO: 28.4 PG (ref 24–34)
MCHC RBC AUTO-ENTMCNC: 30.4 G/DL (ref 31–37)
MCV RBC AUTO: 93.5 FL (ref 78–100)
NRBC # BLD: 0.2 K/UL (ref 0–0.01)
NRBC BLD-RTO: 1.9 PER 100 WBC
PHOSPHATE SERPL-MCNC: 5 MG/DL (ref 2.5–4.9)
PLATELET # BLD AUTO: 319 K/UL (ref 135–420)
PMV BLD AUTO: 10.2 FL (ref 9.2–11.8)
POTASSIUM SERPL-SCNC: 4.7 MMOL/L (ref 3.5–5.5)
RBC # BLD AUTO: 3.38 M/UL (ref 4.2–5.3)
SODIUM SERPL-SCNC: 135 MMOL/L (ref 136–145)
WBC # BLD AUTO: 10.4 K/UL (ref 4.6–13.2)

## 2023-01-16 PROCEDURE — 80069 RENAL FUNCTION PANEL: CPT

## 2023-01-16 PROCEDURE — 2709999900 HC NON-CHARGEABLE SUPPLY

## 2023-01-16 PROCEDURE — 74011000250 HC RX REV CODE- 250: Performed by: INTERNAL MEDICINE

## 2023-01-16 PROCEDURE — 82962 GLUCOSE BLOOD TEST: CPT

## 2023-01-16 PROCEDURE — 74011250637 HC RX REV CODE- 250/637: Performed by: PHYSICIAN ASSISTANT

## 2023-01-16 PROCEDURE — 65270000046 HC RM TELEMETRY

## 2023-01-16 PROCEDURE — 74011636637 HC RX REV CODE- 636/637: Performed by: PHYSICIAN ASSISTANT

## 2023-01-16 PROCEDURE — 74011250636 HC RX REV CODE- 250/636: Performed by: PHYSICIAN ASSISTANT

## 2023-01-16 PROCEDURE — 99232 SBSQ HOSP IP/OBS MODERATE 35: CPT | Performed by: INTERNAL MEDICINE

## 2023-01-16 PROCEDURE — 94761 N-INVAS EAR/PLS OXIMETRY MLT: CPT

## 2023-01-16 PROCEDURE — 97530 THERAPEUTIC ACTIVITIES: CPT

## 2023-01-16 PROCEDURE — 85027 COMPLETE CBC AUTOMATED: CPT

## 2023-01-16 PROCEDURE — 97116 GAIT TRAINING THERAPY: CPT

## 2023-01-16 PROCEDURE — 94640 AIRWAY INHALATION TREATMENT: CPT

## 2023-01-16 PROCEDURE — 74011250637 HC RX REV CODE- 250/637: Performed by: INTERNAL MEDICINE

## 2023-01-16 PROCEDURE — 97535 SELF CARE MNGMENT TRAINING: CPT

## 2023-01-16 PROCEDURE — 74011250636 HC RX REV CODE- 250/636: Performed by: INTERNAL MEDICINE

## 2023-01-16 PROCEDURE — 74011250637 HC RX REV CODE- 250/637: Performed by: HOSPITALIST

## 2023-01-16 PROCEDURE — 74011636637 HC RX REV CODE- 636/637: Performed by: INTERNAL MEDICINE

## 2023-01-16 PROCEDURE — 36415 COLL VENOUS BLD VENIPUNCTURE: CPT

## 2023-01-16 PROCEDURE — 74011000250 HC RX REV CODE- 250: Performed by: PHYSICIAN ASSISTANT

## 2023-01-16 RX ORDER — PREDNISONE 20 MG/1
40 TABLET ORAL
Status: DISCONTINUED | OUTPATIENT
Start: 2023-01-17 | End: 2023-01-18

## 2023-01-16 RX ADMIN — METOPROLOL TARTRATE 25 MG: 25 TABLET, FILM COATED ORAL at 08:34

## 2023-01-16 RX ADMIN — GUAIFENESIN 600 MG: 600 TABLET, EXTENDED RELEASE ORAL at 22:03

## 2023-01-16 RX ADMIN — METOPROLOL TARTRATE 25 MG: 25 TABLET, FILM COATED ORAL at 17:43

## 2023-01-16 RX ADMIN — TRAMADOL HYDROCHLORIDE 50 MG: 50 TABLET, COATED ORAL at 17:48

## 2023-01-16 RX ADMIN — IPRATROPIUM BROMIDE 0.5 MG: 0.5 SOLUTION RESPIRATORY (INHALATION) at 08:30

## 2023-01-16 RX ADMIN — ARFORMOTEROL TARTRATE 15 MCG: 15 SOLUTION RESPIRATORY (INHALATION) at 08:30

## 2023-01-16 RX ADMIN — GABAPENTIN 400 MG: 400 CAPSULE ORAL at 08:34

## 2023-01-16 RX ADMIN — METHYLPREDNISOLONE SODIUM SUCCINATE 40 MG: 40 INJECTION, POWDER, FOR SOLUTION INTRAMUSCULAR; INTRAVENOUS at 08:35

## 2023-01-16 RX ADMIN — SODIUM CHLORIDE, PRESERVATIVE FREE 10 ML: 5 INJECTION INTRAVENOUS at 22:04

## 2023-01-16 RX ADMIN — IPRATROPIUM BROMIDE 0.5 MG: 0.5 SOLUTION RESPIRATORY (INHALATION) at 19:33

## 2023-01-16 RX ADMIN — BUDESONIDE 500 MCG: 0.5 INHALANT RESPIRATORY (INHALATION) at 08:30

## 2023-01-16 RX ADMIN — GUAIFENESIN 600 MG: 600 TABLET, EXTENDED RELEASE ORAL at 08:34

## 2023-01-16 RX ADMIN — Medication 5 UNITS: at 22:04

## 2023-01-16 RX ADMIN — SODIUM CHLORIDE, PRESERVATIVE FREE 10 ML: 5 INJECTION INTRAVENOUS at 06:13

## 2023-01-16 RX ADMIN — EZETIMIBE 10 MG: 10 TABLET ORAL at 08:34

## 2023-01-16 RX ADMIN — AZITHROMYCIN MONOHYDRATE 500 MG: 500 INJECTION, POWDER, LYOPHILIZED, FOR SOLUTION INTRAVENOUS at 17:38

## 2023-01-16 RX ADMIN — SODIUM CHLORIDE, PRESERVATIVE FREE 10 ML: 5 INJECTION INTRAVENOUS at 17:42

## 2023-01-16 RX ADMIN — Medication 6 UNITS: at 12:02

## 2023-01-16 RX ADMIN — ASPIRIN 81 MG CHEWABLE TABLET 162 MG: 81 TABLET CHEWABLE at 08:34

## 2023-01-16 RX ADMIN — ATORVASTATIN CALCIUM 80 MG: 40 TABLET, FILM COATED ORAL at 22:03

## 2023-01-16 RX ADMIN — HEPARIN SODIUM 5000 UNITS: 5000 INJECTION INTRAVENOUS; SUBCUTANEOUS at 17:43

## 2023-01-16 RX ADMIN — Medication 5 MG: at 22:03

## 2023-01-16 RX ADMIN — Medication 6 UNITS: at 22:03

## 2023-01-16 RX ADMIN — Medication 9 UNITS: at 17:44

## 2023-01-16 RX ADMIN — Medication 6 UNITS: at 08:36

## 2023-01-16 RX ADMIN — HEPARIN SODIUM 5000 UNITS: 5000 INJECTION INTRAVENOUS; SUBCUTANEOUS at 12:01

## 2023-01-16 NOTE — PROGRESS NOTES
Reason for Admission:   COPD exacerbation ([facility] [address] [J44.1]    PCP: Tracey Barroso MD               RUR Score:     29%             Resources/supports as identified by patient/family:       Top Challenges facing patient (as identified by patient/family and CM): Finances/Medication cost?     [id number] [facility]    Transportation      patients  to provide transportation  Support system or lack thereof?     Living arrangements? Home with    Self-care/ADLs/Cognition?   wnl        Current Advanced Directive/Advance Care Plan:   yes    Healthcare Decision Maker:   Primary Decision Maker: Jefe Yin (PICKUP) - Spouse - [phone]    Click here to complete [employer] including selection of the Healthcare Decision Maker Relationship (ie \"Primary\")                          Plan for utilizing home health:    no                      Likelihood of readmission:   HIGH    Transition of Care Plan:                    Initial assessment completed with patient. Cognitive status of patient: oriented to time, place, person and situation. Face sheet information confirmed:  yes. The patient designates  to participate in her discharge plan and to receive any needed information. This patient lives in a single family home with patient and . Patient is able to navigate steps as needed. Prior to hospitalization, patient was considered to be independent with ADLs/IADLS : yes . Patient has a current ACP document on file: yes  The patient and  will be available to transport patient home upon discharge. The patient already has Tunde Presto, Scooter, Shower chair, BSC, and  none  medical equipment available in the home. Patient is not currently active with home health. Patient has not stayed in a skilled nursing facility or rehab.        This patient is on dialysis :yes    If yes, hemodialysis patient and receives treatment on Tuesday/Thursday/Saturday at [address] [place] [address] Road  Chair time is 21 . Pt is transported to/from dialysis by . Currently, the discharge plan is Home. The patient states that she can obtain her medications from the pharmacy, and take her medications as directed. Patient's current insurance is Payor: [employer] MEDICARE / Plan: [address] / Product Type: Managed Care Medicare /       Care Management Interventions  PCP Verified by CM:  Yes (Dr. Evan Curtis)  Mode of Transport at Discharge: Self (pt states her  will transport her home at time of discharge.)  Transition of Care Consult (CM Consult): Discharge Planning  Discharge Durable Medical Equipment: No  Physical Therapy Consult: Yes  Occupational Therapy Consult: Yes  Speech Therapy Consult: No  Support Systems: Spouse/Significant Other  Confirm Follow Up Transport: Self  The Plan for Transition of Care is Related to the Following Treatment Goals : Home with   Discharge Location  Patient Expects to be Discharged to[de-identified] Home with family assistance        [facility] RN [address]  Case Management      Readmission Assessment  Number of days since last admission?: 8-30 days  Previous disposition: Home with Family  Who is being interviewed?: Patient  What was the patient's/caregiver's perception as to why they think they needed to return back to the hospital?: Other (Comment) (last admission was for AVF insertion.)  Did you visit your Primary Care Physician after you left the hospital, before you returned this time?: No  Why weren't you able to visit your PCP?: Other (Comment) (saw her specialist after AVF placement and did not need to see her PCP at that time.)  Did you see a specialist, such as Cardiac, Pulmonary, Orthopedic Physician, etc. after you left the hospital?: Yes  Who advised the patient to return to the hospital?: Self-referral  Does the patient report anything that got in the way of taking their medications?: No  In our efforts to provide the best possible care to you and others like you, can you think of anything that we could have done to help you after you left the hospital the first time, so that you might not have needed to return so soon?: Other (Comment) (nothing noted.)

## 2023-01-16 NOTE — PROGRESS NOTES
Noted that Physical therapy recommending home with home health. Patient declining home health at this time. States she doesn't need anyone to come out.     [facility] RN [place]  Case Management

## 2023-01-16 NOTE — PROGRESS NOTES
Patient reports feeling jittery with nebulizers given together. Patient advised she is no longer receiving Albuterol and that may have caused jitters. Patient reports she is supposed to have 02 at home but when DME tried to deliver it last year no one was home and she never got it. Patient reports not using CPAP for a few years now. RT advised patient she has been ordered CPAP to start tonight. Patient said she does not use it at home and will not use it now.

## 2023-01-16 NOTE — PROGRESS NOTES
[facility]  Progress Note    Patient: Nelida Nichols Age: 81 y.o. : 1950 MR#: [id number] SSN: [id number]  Date/Time: 1/15/2023    Subjective:     Pt states her breathing is better but still has PIERSON. Assessment/Plan:    81year old female w/ h/o ESRD, COPD not on home 02, HFpEF admitted to the ED after presenting with c/o severe SOB and wheezing. -COPD w/ acute exacerbation: on IV steroid, bronchodilators, on supplemental 02. HISTORY OF:  -HTN  -CAD  -HFpEF: last echo in 2022-->EF: 50-55%  -Type 2DM: detemir 15 units/day as outpt, here on lantus 5 units, blood sugars within acceptable limits for the most part. -CAD    PLAN:  -Cont bronchodilators, 02 support  -Decrease steroid tomorrow  -Cont HD  -Cont azithromycin  -Cont pt/ot    Dispo: Likely home tomorrow if tolerates steroid taper, able to ambulate w/o distress. Additional Notes:      Case discussed with:  [x]Patient  []Family  []Nursing  []Case Management  DVT Prophylaxis:  []Lovenox  [x]Hep SQ  []SCDs  []Coumadin   []On Heparin gtt    Objective:   VS: Visit Vitals  BP (!) 146/77 (BP 1 Location: Left lower arm, BP Patient Position: Semi fowlers; At rest)   Pulse 76   Temp 98.1 °F (36.7 °C)   Resp 16   Ht 5' 4\" (1.626 m)   Wt 96.6 kg (213 lb)   SpO2 98%   Breastfeeding Unknown   BMI 36.56 kg/m²      Tmax/24hrs: Temp (24hrs), Av.1 °F (36.7 °C), Min:97.3 °F (36.3 °C), Max:98.8 °F (37.1 °C)    Input/Output:   Intake/Output Summary (Last 24 hours) at 1/15/2023 2218  Last data filed at 1/15/2023 1302  Gross per 24 hour   Intake 480 ml   Output --   Net 480 ml       General:  alert, awake, in nad  Cardiovascular:  no jvd, no peripheral edema  Pulmonary:  w/o increased wob, ctab  GI:  soft, nt, nd  Extremities:  no edema  Additional:      Labs:    Recent Results (from the past 24 hour(s))   RENAL FUNCTION PANEL    Collection Time: 01/15/23  2:10 AM   Result Value Ref Range    Sodium 138 136 - 145 mmol/L    Potassium 4.4 3.5 - 5.5 mmol/L    Chloride 102 100 - 111 mmol/L    CO2 27 21 - 32 mmol/L    Anion gap 9 3.0 - 18 mmol/L    Glucose 224 (H) 74 - 99 mg/dL    BUN 43 (H) 7.0 - 18 MG/DL    Creatinine 2.26 (H) 0.6 - 1.3 MG/DL    BUN/Creatinine ratio 19 12 - 20      eGFR 23 (L) >60 ml/min/1.73m2    Calcium 9.5 8.5 - 10.1 MG/DL    Phosphorus 3.4 2.5 - 4.9 MG/DL    Albumin 3.6 3.4 - 5.0 g/dL   CBC WITH AUTOMATED DIFF    Collection Time: 01/15/23  2:10 AM   Result Value Ref Range    WBC 12.3 4.6 - 13.2 K/uL    RBC 3.69 (L) 4.20 - 5.30 M/uL    HGB 10.5 (L) 12.0 - 16.0 g/dL    HCT 33.8 (L) 35.0 - 45.0 %    MCV 91.6 78.0 - 100.0 FL    MCH 28.5 24.0 - 34.0 PG    MCHC 31.1 31.0 - 37.0 g/dL    RDW 18.7 (H) 11.6 - 14.5 %    PLATELET 674 877 - 509 K/uL    MPV 10.1 9.2 - 11.8 FL    NRBC 1.6 (H) 0  WBC    ABSOLUTE NRBC 0.20 (H) 0.00 - 0.01 K/uL    NEUTROPHILS 89 (H) 40 - 73 %    LYMPHOCYTES 5 (L) 21 - 52 %    MONOCYTES 5 3 - 10 %    EOSINOPHILS 0 0 - 5 %    BASOPHILS 0 0 - 2 %    IMMATURE GRANULOCYTES 1 (H) 0.0 - 0.5 %    ABS. NEUTROPHILS 10.9 (H) 1.8 - 8.0 K/UL    ABS. LYMPHOCYTES 0.6 (L) 0.9 - 3.6 K/UL    ABS. MONOCYTES 0.7 0.05 - 1.2 K/UL    ABS. EOSINOPHILS 0.0 0.0 - 0.4 K/UL    ABS. BASOPHILS 0.0 0.0 - 0.1 K/UL    ABS. IMM.  GRANS. 0.1 (H) 0.00 - 0.04 K/UL    DF AUTOMATED     GLUCOSE, POC    Collection Time: 01/15/23  6:52 AM   Result Value Ref Range    Glucose (POC) 188 (H) 70 - 110 mg/dL   GLUCOSE, POC    Collection Time: 01/15/23 11:29 AM   Result Value Ref Range    Glucose (POC) 174 (H) 70 - 110 mg/dL   GLUCOSE, POC    Collection Time: 01/15/23  3:54 PM   Result Value Ref Range    Glucose (POC) 237 (H) 70 - 110 mg/dL   GLUCOSE, POC    Collection Time: 01/15/23  9:16 PM   Result Value Ref Range    Glucose (POC) 199 (H) 70 - 110 mg/dL     Additional Data Reviewed:      Signed By: Mando Blanton MD     January 15, 2023 3:05 PM

## 2023-01-16 NOTE — PROGRESS NOTES
[facility]  Pulmonary, Critical Care, and Sleep Medicine    Name: Fariha Allen MRN: [id number]   : 1950 Hospital: [facility]   Date: 2023        Pulmonary -Critical Care: progress note    Admission Date:   2023  LOS: 3  MAR reviewed and pertinent medications noted or modified as needed    IMPRESSION:   Acute hypoxic respiratory failure - Improving  Wheezing  - COPD and may have component of  \"cardiac asthma\" in the setting of chronic systolic/diastolic heart failure, favor the latter Echo from 2015 with EF 45% and mild to moderate mitral regurgitation. CXR per my review notes some vascular congestion at bases RIGHT > LEFT  Dysphonia: Persistent Upper airway sounds: Possible soft tissue and/or Vocal Cord Dysfunction  COPD: Small cystic changes noted on CT Chest 2022. No large blebs. PFT in 2017 consistent with restrictive lung disease. Patient does have prior smoking history and ongoing 2nd hand smoke exposure. Hypertension: CLVH on Echo  CHF: HFpEF: Diastolic dysfunction noted on echo  TESSA  Was previously followed by Dr. Mariposa Torres to follow up and has not been seen for an extended period of time. DM 2  ESRD- HD dependent  CAD/History of STEMI in 2018 requiring intubation and mechanical ventilation  Former smoker 46 PY, quit in 2019, + 2nd hand smoke exposure from   Chronic pain syndrome  Morbid Obesity Body mass index is 41.81 kg/m²  H/O COVID Infection . H/O Abnormal CT Chest Imagin2022- diffuse asymmetric GGOs and atelectasis- most likely from pulmonary edema- Resolved on CT Chest 22         RECOMMENDATIONS:     PULM:  Continue with Brovana/ Pulmicort Q12 hrs-can resume Anoro at discharge  Change Duoneb to PRN  Taper steroids-we will start prednisone 40 mg for total of 5 days  Continue PRN albuterol   If upper airway sounds over VC/neck region persist.  Patient also ex-smoker- consider ENT evaluation of VC with NP scope. Patient's upper airway sounds may be due to redundant soft tissue in neck vs vocal cord dysfunction  Maintain aspiration precautions: HOB >30 degrees  SpO2 goal >92%  Trial of QHS BIPAP  Bronchial /oral hygiene; IS at bedside please  Monitor I&Os  Follow up on pending cultures- Streamline antibiotics per culture data and clinical course  HD per nephrology  Upper Extremity Dopplers are ordered and pending at this time  Electrolyte management, prophylaxis and glycemic control per primary and respective consultants  OP Pulmonary- Sleep follow up  Will continue to follow      CODE STATUS: Full Code        Subjective/History: This patient has been seen and evaluated at the request of Dr. Lu Officer for COPD exacerbation. HPI  Patient is a 81 y.o. female  PMHx of ESRD on HD, COPD not on home O2, CAD, DM, HTN, HFpEF. The patient has been seen by our group as an in-patient. She has not followed up with any Pulmonologist as an OP. The patient states she did not realized she needed to follow up with Pulmonary but that she will in the future. The patient reports increasing shortness of breath and wheezing over the past week- more significant after her HD session. She reports that she had to cut her run 15 minutes early so she could get her medial ride. When she got home she became more short of breath with wheeling. She also noted nausea without emesis. No known sick contacts. No fever or chills. No recurrent nausea. No emesis,  No chest pain  She continues to refrain from smoking, but her  does smoke with in and out side the home. Inhalers: Anoro and PRN albuterol nebs  Respiratory Triggers: smoke and cold air  Pets:  2 has 4: small dogs: chirowenaahua -poodle mixes- they do not sleep with patient and have their own room  Home: No known water leaks, no bug infestations  Obstructive Sleep Apnea (TESSA): The patient has history of TESSA. She was previously followed by Dr. Jessy Alas.   She reports that she had a recalled [employer] PAP device. Her DME was [facility]. She reports that she did register her device with [employer], but never got a replacement. The patient stopped using her device and actually brought her device back to [facility]. She has not used PAP therapy for an extended period of time. She is HD dependant. She denies any diet indiscretions. The patient had ID earlier today in our HD unit. She has a THDC in her right upper chest wall. She had a AVF created a few weeks ago and is not ready for use    Interval Evaluation:   01/16/23  LOS: 3    Sitting up in bed on room air  Reports feeling significantly improved  No chest pain  No hemoptysis  The patient states that she feels her vocal quality is not as good. She denies voice cutting out but it is not as strong and she also notes some difficulty with word finding so it takes her longer to express what she wishes to convey.    + nasal dryness and some small blood cots in nares earlier today      Inpat Anti-Infectives (From admission, onward)       Start     Ordered Stop    01/13/23 1724  azithromycin (ZITHROMAX) 500 mg in 0.9% sodium chloride 250 mL (VIAL-MATE)  500 mg,   IntraVENous,   EVERY 24 HOURS         01/13/23 1723 --             Patient Vitals for the past 24 hrs:   Temp Pulse Resp BP SpO2   01/16/23 1100 98.2 °F (36.8 °C) 65 20 (!) 144/6 98 %   01/16/23 0832 -- -- -- -- 98 %   01/16/23 0757 98.3 °F (36.8 °C) 81 20 (!) 143/76 100 %   01/16/23 0415 98.1 °F (36.7 °C) 71 20 136/75 96 %   01/15/23 2340 98.3 °F (36.8 °C) 69 20 (!) 142/74 98 %   01/15/23 2109 -- -- -- -- 98 %   01/15/23 1945 98.1 °F (36.7 °C) 76 16 (!) 146/77 98 %   01/15/23 1551 98.1 °F (36.7 °C) 82 19 132/76 96 %   01/15/23 1127 97.7 °F (36.5 °C) 74 17 136/75 100 %         Past Medical History:   Diagnosis Date    Abnormal WBC count 05/17/2016    Anemia     Bilateral shoulder pain 09/27/2016    Chondromalacia of both patellae     Chronic heart failure with preserved ejection fraction ([facility] 1/13/2023 COPD (chronic obstructive pulmonary disease) ([facility] 09/2015    mild-mod dz; Dr Thomas     Diabetes [facility]) 2013    Diabetic eye exam ([facility] 2016    Dilated cardiomyopathy ([place] [id number]     Dyslipidemia     Gout     Heart attack ([place] [address] 10/18/2019    Stented    History of echocardiogram 11/14/2014    Mild LVE. EF 40%. Mild, diffuse hypk. Mild LAE. Mild MR.       Hypercholesteremia 01/08/2014    Hypertension 2000    Noncompliance with medications 02/16/2016    Obesity     Orthostatic hypotension 05/17/2016    Osteoarthritis of both knees     Pain management 04/01/2016    Dr. Gil Bakari     Popliteal cyst, bilateral      Sleep apnea     not using cpap    Stage 3b chronic kidney disease ([facility] 08/08/2022    Vitamin D deficiency 10/16/2014      Past Surgical History:   Procedure Laterality Date    HX HEART CATHETERIZATION  2019    Coronary stent    HX TUBAL LIGATION      IR INSERT TUNL CVC W/O PORT OVER 5 YR  09/21/2022        Current Facility-Administered Medications   Medication Dose Route Frequency    ipratropium (ATROVENT) 0.02 % nebulizer solution 0.5 mg  0.5 mg Nebulization Q6H RT    methylPREDNISolone (PF) (SOLU-MEDROL) injection 40 mg  40 mg IntraVENous Q12H    melatonin tablet 5 mg  5 mg Oral QHS    heparin (porcine) injection 5,000 Units  5,000 Units SubCUTAneous Q8H    epoetin tonny-epbx (RETACRIT) injection 4,000 Units  4,000 Units SubCUTAneous Q TUE, THU & SAT    aspirin chewable tablet 162 mg  162 mg Oral DAILY    atorvastatin (LIPITOR) tablet 80 mg  80 mg Oral QHS    ezetimibe (ZETIA) tablet 10 mg  10 mg Oral DAILY    gabapentin (NEURONTIN) capsule 400 mg  400 mg Oral DAILY    sodium chloride (NS) flush 5-40 mL  5-40 mL IntraVENous Q8H    arformoteroL (BROVANA) neb solution 15 mcg  15 mcg Nebulization BID RT    budesonide (PULMICORT) 500 mcg/2 ml nebulizer suspension  500 mcg Nebulization BID RT    azithromycin (ZITHROMAX) 500 mg in 0.9% sodium chloride 250 mL (VIAL-MATE)  500 mg IntraVENous Q24H    metoprolol tartrate (LOPRESSOR) tablet 25 mg  25 mg Oral BID    insulin lispro (HUMALOG) injection   SubCUTAneous AC&HS    insulin glargine (LANTUS) injection 5 Units  5 Units SubCUTAneous QHS    guaiFENesin ER (MUCINEX) tablet 600 mg  600 mg Oral Q12H     No Known Allergies     Review of Systems:  A comprehensive review of systems was negative except for that written in the HPI. Objective:   Vital Signs:    Visit Vitals  BP (!) 144/6 (BP 1 Location: Left lower arm, BP Patient Position: Lying left side)   Pulse 65   Temp 98.2 °F (36.8 °C)   Resp 20   Ht 5' 4\" (1.626 m)   Wt 96.6 kg (213 lb)   SpO2 98%   Breastfeeding Unknown   BMI 36.56 kg/m²       O2 Device: Nasal cannula   O2 Flow Rate (L/min): 2 l/min   Temp (24hrs), Av.1 °F (36.7 °C), Min:97.7 °F (36.5 °C), Max:98.3 °F (36.8 °C)       Intake/Output:   Last shift:      No intake/output data recorded. Last 3 shifts:  1901 -  0700  In: 480 [P.O.:480]  Out: -     Intake/Output Summary (Last 24 hours) at 2023 1126  Last data filed at 1/15/2023 1302  Gross per 24 hour   Intake 240 ml   Output --   Net 240 ml         Physical Exam:    General:  Alert, cooperative, no distress, appears stated age + Obese body habitus   Head:  Normocephalic, without obvious abnormality, atraumatic. Eyes:  Conjunctivae/corneas clear. PERRL, EOMs intact. Nose: Nares normal. Septum midline. Mucosa normal. No drainage or sinus tenderness. Throat: Lips, mucosa, and tongue normal. No teeth   Neck: Supple, symmetrical, trachea midline, no adenopathy, thyroid: no enlargment/tenderness/nodules, no carotid bruit and no JVD. Back:   Symmetric,   Lungs:   Bilateral breath sounds present. Clear to auscultation   Chest wall:  THDC right upper chest wall. No tenderness or deformity.- No crepitus   Heart:  Regular rate and rhythm, S1, S2 normal, no murmur, click, rub or gallop. Abdomen:   Soft, non-tender. Bowel sounds normal. No masses,  No organomegaly.    Extremities: Extremities normal, atraumatic, no cyanosis or edema. - unmatured AFV right arm- + palpable thrill   Pulses: 2+ and symmetric all extremities. Skin: Skin color, texture, turgor normal. No rashes or lesions   Lymph nodes:      Cervical, supraclavicular nodes: normal on palpation   Neurologic: Grossly nonfocal       Data:     Recent Labs     01/16/23  0052 01/15/23  0210 01/14/23  0704   WBC 10.4 12.3 8.8   HGB 9.6* 10.5* 9.7*   HCT 31.6* 33.8* 31.4*    323 289        Latest Reference Range & Units 12/9/22 12:48 12/10/22 04:27 12/11/22 02:46 12/12/22 04:35 12/13/22 01:46 1/13/23 09:55   EOSINOPHILS 0 - 5 % 3 3 2 2 2 1   ABS. EOSINOPHILS 0.0 - 0.4 K/UL 0.1 0.1 0.1 0.1 0.1 0.0         Recent Labs     01/16/23  0052 01/15/23  0210 01/14/23  0704   * 138 137   K 4.7 4.4 4.4    102 105   CO2 23 27 22   * 224* 251*   BUN 80* 43* 35*   CREA 3.03* 2.26* 2.05*   CA 8.8 9.5 9.0   PHOS 5.0* 3.4 3.7   ALB 3.3* 3.6 3.5       Lab Results   Component Value Date/Time    NT pro-BNP 1,057 (H) 10/08/2022 05:15 PM    NT pro-BNP 2,013 (H) 09/09/2022 07:34 AM    NT pro-BNP 1,871 (H) 08/08/2022 01:48 AM    NT pro-BNP 3,952 (H) 07/08/2022 09:53 AM    NT pro-BNP 1,020 (H) 03/18/2021 01:29 AM     No results for input(s): PH, PCO2, PO2, HCO3, FIO2 in the last 72 hours. No results for input(s): FIO2I, IFO2, HCO3I, IHCO3, HCOPOC, PCO2I, PCOPOC, IPHI, PHI, PHPOC, PO2I, PO2POC in the last 72 hours.     No lab exists for component: IPOC2      MICRO:  Results       Procedure Component Value Units Date/Time    RESPIRATORY VIRUS PANEL W/COVID-19, PCR [[id number]] Collected: 01/13/23 6973    Order Status: Completed Specimen: Nasopharyngeal Updated: 01/13/23 1858     Adenovirus Not detected        Coronavirus 229E Not detected        Coronavirus HKU1 Not detected        Coronavirus CVNL63 Not detected        Coronavirus OC43 Not detected        SARS-CoV-2, PCR Not detected        Metapneumovirus Not detected        Rhinovirus and Enterovirus Not detected        Influenza A Not detected        Influenza A, subtype H1 Not detected        Influenza A, subtype H3 Not detected        INFLUENZA A H1N1 PCR Not detected        Influenza B Not detected        Parainfluenza 1 Not detected        Parainfluenza 2 Not detected        Parainfluenza 3 Not detected        Parainfluenza virus 4 Not detected        RSV by PCR Not detected        B. parapertussis, PCR Not detected        Bordetella pertussis - PCR Not detected        Chlamydophila pneumoniae DNA, QL, PCR Not detected        Mycoplasma pneumoniae DNA, QL, PCR Not detected                 CARDIO:    EKG Results       Procedure 720 Value Units Date/Time    EKG, 12 LEAD, INITIAL [[id number]] Collected: 01/13/23 0942    Order Status: Completed Updated: 01/13/23 1856     Ventricular Rate 98 BPM      Atrial Rate 98 BPM      P-R Interval 160 ms      QRS Duration 78 ms      Q-T Interval 360 ms      QTC Calculation (Bezet) 459 ms      Calculated P Axis 74 degrees      Calculated R Axis 20 degrees      Calculated T Axis 86 degrees      Diagnosis --     Normal sinus rhythm  Left atrial enlargement  Minimal voltage criteria for LVH, may be normal variant ( [place] product )  Borderline ECG  When compared with ECG of 08-DEC-2022 12:19,  No significant change was found  Confirmed by Ricky Wagner ([id number]) on 1/13/2023 6:56:19 PM              06/25/22    ECHO ADULT COMPLETE 06/29/2022 6/29/2022    Interpretation Summary    Left Ventricle: Mildly reduced left ventricular systolic function with a visually estimated EF of 50 - 55%. Left ventricle is mildly dilated. Increased wall thickness. Findings consistent with mild concentric hypertrophy. See diagram for wall motion findings. Diastolic dysfunction present with normal LV EF. Mitral Valve: Mildly thickened leaflet. Mild regurgitation with a centrally directed jet.     Signed by: Siva Erickson MD on 6/29/2022 12:12 PM    Imaging:  I have personally reviewed the patients radiographs and have reviewed the reports:                  Chest CT:  9/20/22  IMPRESSION     1. Interval resolution of extensive bilateral lung airspace disease. 2.  Interval resolved small bilateral pleural effusions and mild cardiomegaly. 3.  Interval resolved lymphadenopathy. 4.  Chronic interstitial reticular and cystic changes of the lungs appear  stable. Thank you for your referral.            Complex decision making was made in the evaluation and management plans during this consultation. More than 50% of time was spent in counseling and coordination of care including review of data and discussion with other team members.       Stacy Sarmiento MD    [facility]  Pulmonary, Critical Care, and Sleep Medicine

## 2023-01-16 NOTE — PROGRESS NOTES
RENAL DAILY PROGRESS NOTE              Subjective:       Complaint:   Overnight events noted  no nausea, vomiting, chest pain  Breathing is better    IMPRESSION:   IMPRESSION:   ESRD  Access: TDC,maturing av fistula on right  arm(placed 3 weeks ago-awaiting rpt procedure for superfilization)   COPD excerebration. CAD,cardiomyopathy  Secondary hyperparathyroidism  ACD   PLAN:   HD on schedule- TTS  C/w epogen  Phos is controlled. If patient need IV contrast for CT imaging, need to be coordinated with renal team.   Avoid Gadolinium due to its association with nephrogenic systemic fibrosis in a patients with severe ARF and ESRD. Please dose all medications for creatinine clearance <15/dialysis. Avoid blood pressure checks, blood draws, peripheral iv's on arm with access. AvoidPICC lines on either arm in order to preserve veins for dialysis access creation.                  Current Facility-Administered Medications   Medication Dose Route Frequency    [START ON 1/17/2023] predniSONE (DELTASONE) tablet 40 mg  40 mg Oral DAILY WITH BREAKFAST    sodium chloride (OCEAN) 0.65 % nasal squeeze bottle 2 Spray  2 Spray Both Nostrils Q2H PRN    ipratropium (ATROVENT) 0.02 % nebulizer solution 0.5 mg  0.5 mg Nebulization Q6H RT    traMADoL (ULTRAM) tablet 50 mg  50 mg Oral Q6H PRN    melatonin tablet 5 mg  5 mg Oral QHS    heparin (porcine) injection 5,000 Units  5,000 Units SubCUTAneous Q8H    epoetin tonny-epbx (RETACRIT) injection 4,000 Units  4,000 Units SubCUTAneous Q TUE, THU & SAT    aspirin chewable tablet 162 mg  162 mg Oral DAILY    atorvastatin (LIPITOR) tablet 80 mg  80 mg Oral QHS    ezetimibe (ZETIA) tablet 10 mg  10 mg Oral DAILY    gabapentin (NEURONTIN) capsule 400 mg  400 mg Oral DAILY    sodium chloride (NS) flush 5-40 mL  5-40 mL IntraVENous Q8H    sodium chloride (NS) flush 5-40 mL  5-40 mL IntraVENous PRN    acetaminophen (TYLENOL) tablet 650 mg  650 mg Oral Q6H PRN    Or    acetaminophen (TYLENOL) suppository 650 mg  650 mg Rectal Q6H PRN    polyethylene glycol (MIRALAX) packet 17 g  17 g Oral DAILY PRN    bisacodyL (DULCOLAX) suppository 10 mg  10 mg Rectal DAILY PRN    ondansetron (ZOFRAN ODT) tablet 4 mg  4 mg Oral Q8H PRN    Or    ondansetron (ZOFRAN) injection 4 mg  4 mg IntraVENous Q6H PRN    arformoteroL (BROVANA) neb solution 15 mcg  15 mcg Nebulization BID RT    budesonide (PULMICORT) 500 mcg/2 ml nebulizer suspension  500 mcg Nebulization BID RT    azithromycin (ZITHROMAX) 500 mg in 0.9% sodium chloride 250 mL (VIAL-MATE)  500 mg IntraVENous Q24H    metoprolol tartrate (LOPRESSOR) tablet 25 mg  25 mg Oral BID    insulin lispro (HUMALOG) injection   SubCUTAneous AC&HS    glucose chewable tablet 16 g  4 Tablet Oral PRN    glucagon (GLUCAGEN) injection 1 mg  1 mg IntraMUSCular PRN    dextrose 10% infusion 0-250 mL  0-250 mL IntraVENous PRN    insulin glargine (LANTUS) injection 5 Units  5 Units SubCUTAneous QHS    guaiFENesin ER (MUCINEX) tablet 600 mg  600 mg Oral Q12H       Review of Symptoms: comprehensive ROS negative except above.    Objective:   Patient Vitals for the past 24 hrs:   Temp Pulse Resp BP SpO2   01/16/23 1100 98.2 °F (36.8 °C) 65 20 (!) 144/6 98 %   01/16/23 0832 -- -- -- -- 98 %   01/16/23 0757 98.3 °F (36.8 °C) 81 20 (!) 143/76 100 %   01/16/23 0415 98.1 °F (36.7 °C) 71 20 136/75 96 %   01/15/23 2340 98.3 °F (36.8 °C) 69 20 (!) 142/74 98 %   01/15/23 2109 -- -- -- -- 98 %   01/15/23 1945 98.1 °F (36.7 °C) 76 16 (!) 146/77 98 %   01/15/23 1551 98.1 °F (36.7 °C) 82 19 132/76 96 %          Weight change:      01/14 1901 - 01/16 0700  In: 480 [P.O.:480]  Out: -     Intake/Output Summary (Last 24 hours) at 1/16/2023 1146  Last data filed at 1/15/2023 1302  Gross per 24 hour   Intake 240 ml   Output --   Net 240 ml       Physical Exam:   General: comfortable, no acute distress   HEENT sclera anicteric, supple neck, no thyromegaly  CVS: S1S2 heard,  no rub  RS: + air entry b/l  Abd: Soft, Non tender, Not distended, Positive bowel sounds, no organomegaly, no CVA / supra pubic tenderness  Neuro: non focal, awake, alert , CN II-XII are grossly intact  Extrm: no edema, no cyanosis, clubbing   Skin: no visible  Rash  Musculoskeletal: No gross joints or bone deformities   Access: maturing right arm access      Data Review:     LABS:   Hematology:   Recent Labs     01/16/23  0052 01/15/23  0210 01/14/23  0704   WBC 10.4 12.3 8.8   HGB 9.6* 10.5* 9.7*   HCT 31.6* 33.8* 31.4*       Chemistry:   Recent Labs     01/16/23  0052 01/15/23  0210 01/14/23  0704   BUN 80* 43* 35*   CREA 3.03* 2.26* 2.05*   CA 8.8 9.5 9.0   ALB 3.3* 3.6 3.5   K 4.7 4.4 4.4   * 138 137    102 105   CO2 23 27 22   PHOS 5.0* 3.4 3.7   * 224* 251*              Procedures/imaging: see electronic medical records for all procedures, Xrays and details which were not copied into this note but were reviewed prior to creation of Plan          Assessment & Plan:       See above      Liz Denver, MD  1/16/2023

## 2023-01-16 NOTE — PROGRESS NOTES
Home Oxygen ordered on [facility] with [facility], and let them know that patient will be discharging tomorrow.                Pablito Gamez RN  Case Management [phone]

## 2023-01-16 NOTE — ROUTINE PROCESS
Patient sat on room air at rest 92%, but short of breath. Patient walked down the hannon to the nurse station, pt became short of breath, dizzy, and lightheaded. Oxygen Sat while walking dropped to 87%. Had to ask  to help hold the patient's arm to help back to bed. Patient sitting on bed still c/o of dizziness and lightheadedness, oxygen applied at 2 liters and sat went up to 96%. [address] made of aware of walking test. D/c planner given walk test form.

## 2023-01-16 NOTE — PROGRESS NOTES
Comprehensive Nutrition Assessment    Type and Reason for Visit: Initial, Positive nutrition screen    Nutrition Recommendations/Plan:   Modify nutrition supplement: decrease Nepro from 6x daily to BID (420 kcal, 19 gm protein each)  Continue all other nutrition interventions. Encourage/ monitor po intake of meals and supplements. Malnutrition Assessment:  Malnutrition Status:  No malnutrition (01/16/23 3529)      Nutrition History and Allergies:   Past medical hx:   ESRD on HD, COPD, CAD, DM, HTN, anemia, dyslipidemia, gout, heart attack, hypercholesterolemia, vitamin D deficiency. Wt fluctuation PTA per chart hx:  210 lb on 12/19/21,  225 lb on 3/10/22,  214 lb on 6/29/22,   243 lb on 9/21/22,   215 lb on 12/12/22,   213 lb on 1/13/23. Pt reported experiencing unplanned wt loss PTA per nutrition screen. No known food allergies     Nutrition Assessment:    Pt asleep/ lethargic at time of visit. Has good meal intake per chart documentation. Tolerating diet. Unable to assess intake of nepro supplements; noted 6 ordered daily, excessive amount. Will modify order to BID as pt having adequate meal intake. Pt with ESRD on HD. Nutrition Related Findings:    BM 1/12. No edema. Wound Type: None    Current Nutrition Intake & Therapies:  Average Meal Intake: %  Average Supplement Intake: Unable to assess  ADULT DIET Regular; Low Potassium (Less than 3000 mg/day); Low Phosphorus (Less than 1000 mg); No Concentrated Sweets  ADULT ORAL NUTRITION SUPPLEMENT Breakfast, AM Snack, Lunch, PM Snack, Dinner, Bolanos[employer]; Renal Supplement    Anthropometric Measures:  Height: 5' 4\" (162.6 cm)  Ideal Body Weight (IBW): 120 lbs (55 kg)  Admission Body Weight: 212 lb 15.4 oz  Current Body Wt:  96.6 kg (212 lb 15.4 oz), 177.5 % IBW.  Stated  Current BMI (kg/m2): 36.5  Usual Body Weight: 97.5 kg (215 lb)  % Weight Change (Calculated): -0.9  Weight Adjustment: No adjustment  BMI Category: Obese class 2 (BMI 35.0-39. 9)    Estimated Daily Nutrient Needs:  Energy Requirements Based On: Kcal/kg (wt x25-35)  Weight Used for Energy Requirements: Other (specify) (SBW)  Energy (kcal/day): 2992-7735  Weight Used for Protein Requirements: Current  Protein (g/day):  (wt x1-1.2)  Method Used for Fluid Requirements: Standard renal  Fluid (ml/day): 750-1500    Nutrition Diagnosis:   Increased nutrient needs related to catabolic illness, increased demand for energy/nutrients, renal dysfunction as evidenced by dialysis    Nutrition Interventions:   Food and/or Nutrient Delivery: Continue current diet, Modify oral nutrition supplement  Nutrition Education/Counseling: Education not indicated  Coordination of Nutrition Care: Continue to monitor while inpatient       Goals:     Goals: Meet at least 75% of estimated needs, prior to discharge       Nutrition Monitoring and Evaluation:   Behavioral-Environmental Outcomes: None identified  Food/Nutrient Intake Outcomes: Food and nutrient intake, Supplement intake  Physical Signs/Symptoms Outcomes: Biochemical data, GI status, Nutrition focused physical findings, Skin    Discharge Planning:    Continue current diet    Oneyda Herring, [address]  Contact: [phone]

## 2023-01-16 NOTE — ROUTINE PROCESS
Bedside shift change report given to Sharon Fuentes RN (oncoming nurse). Report included the following information SBAR, MAR, and Recent Results. Pt with recent changes to respiratory treatments d/t side affects but continues to report feeling \"really shaky\" after her breathing treatments. Information passed on to receiving nurse.

## 2023-01-16 NOTE — PROGRESS NOTES
Problem: Self Care Deficits Care Plan (Adult)  Goal: *Acute Goals and Plan of Care (Insert Text)  Description: Occupational Therapy Goals  Initiated 1/15/2023 within 7 day(s). 1.  Patient will perform lower body dressing with modified independence and no increased SOB  2. Patient will perform bathing with modified independence and no increased SOB  3. Patient will maneuver on and off BSC with modified independence and no increased SOB  7. Patient will demonstrate independence with upper trunk alignment, pacing and modified proper breathing techniques in preparation for her efficient completion of her self care routine    Prior Level of Function: she reports she is independent with her self care and has significant SOB and difficulty with task completion. She lives with her  who assists her with her IADL's     Outcome: Progressing Towards Goal   OCCUPATIONAL THERAPY TREATMENT    Patient: Eligio Haq (79 y.o. female)  Date: 1/16/2023  Diagnosis: COPD exacerbation ([facility] [address] [J44.1] COPD with acute exacerbation ([facility] [address]      Precautions: Fall  PLOF: see above    Chart, occupational therapy assessment, plan of care, and goals were reviewed. ASSESSMENT:  Pt sidelying in bed and agreeable to OT session. Pt educated on equipment for energy conservation including reacher, sock aide, and LH sponge. Pt also provided with elastic laces to allow pt to abisai tennis shoes without retying laces. Pt performed bed mobility with SUP and performed doffing/donning socks with use of AE SUP. Pt then requested to tigist bathroom and demonstrated SUP for bathroom mobility and toilet transfer. Pt may benefit from 1-2 more sessions to ensure understanding of use of reacher for donning undergarments and pants as well as further energy conservation education.      Progression toward goals:  [x]          Improving appropriately and progressing toward goals  []          Improving slowly and progressing toward goals  []          Not making progress toward goals and plan of care will be adjusted     PLAN:  Patient continues to benefit from skilled intervention to address the above impairments. Continue treatment per established plan of care. Further Equipment Recommendations for Discharge:  N/A    AMPAC:   Current research shows that an AM-PAC score of 18 or greater is associated with a discharge to the patient's home setting. Based on an AM-PAC score of 21/24 and their current ADL deficits; it is recommended that the patient have 2-3 sessions per week of Occupational Therapy at d/c to increase the patient's independence. This AMPAC score should be considered in conjunction with interdisciplinary team recommendations to determine the most appropriate discharge setting. Patient's social support, diagnosis, medical stability, and prior level of function should also be taken into consideration. SUBJECTIVE:   Patient stated I get out of breath when I reach down to tie my shoes.     OBJECTIVE DATA SUMMARY:   Cognitive/Behavioral Status:  Neurologic State: Alert  Orientation Level: Oriented X4  Cognition: Follows commands       Functional Mobility and Transfers for ADLs:   Bed Mobility:     Supine to Sit: Supervision  Sit to Supine: Supervision      Transfers: Toilet Transfer : Supervision           Bathroom Mobility: Supervision/set up    Balance:  Sitting: Intact  ADL Intervention:   Lower Body Dressing Assistance  Socks: Supervision  Adaptive Equipment Used: Sock aid;Reacher    Pain:  Pain level pre-treatment: 0/10   Pain level post-treatment: 0/10  Pain Intervention(s): Medication (see MAR); Rest, Ice, Repositioning   Response to intervention: Nurse notified, See doc flow    Activity Tolerance:    good  Please refer to the flowsheet for vital signs taken during this treatment.   After treatment:   []  Patient left in no apparent distress sitting up in chair  [x]  Patient left in no apparent distress in bed  [x]  Call bell left within reach  []  Nursing notified  []  Caregiver present  []  Bed alarm activated    COMMUNICATION/EDUCATION:   [x] Role of Occupational Therapy in the acute care setting  [x] Home safety education was provided and the patient/caregiver indicated understanding. [x] Patient/family have participated as able in working towards goals and plan of care. [x] Patient/family agree to work toward stated goals and plan of care. [] Patient understands intent and goals of therapy, but is neutral about his/her participation. [] Patient is unable to participate in goal setting and plan of care. Thank you for this referral.  Mariya Chi OT  Time Calculation: 17 mins    [employer] AM-PAC® Daily Activity Inpatient Short Form (6-Clicks)*    How much HELP from another person does the patient currently need    (If the patient hasn't done an activity recently, how much help from another person do you think he/she would need if he/she tried?)   Total (Total A or Dep)   A Lot  (Mod to Max A)   A Little (Sup or Min A)   None (Mod I to I)   Putting on and taking off regular lower body clothing? [] 1 [] 2 [x] 3 [] 4   2. Bathing (including washing, rinsing,      drying)? [] 1 [] 2 [x] 3 [] 4   3. Toileting, which includes using toilet, bedpan or urinal?   [] 1 [] 2 [x] 3 [] 4   4. Putting on and taking off regular upper body clothing? [] 1 [] 2 [] 3 [x] 4   5. Taking care of personal grooming such as brushing teeth? [] 1 [] 2 [] 3 [x] 4   6. Eating meals?    [] 1 [] 2 [] 3 [x] 4

## 2023-01-16 NOTE — PROGRESS NOTES
[facility] Hospitalist Group  Progress Note    Patient: Vu Weiner Age: 81 y.o. : 1950 MR#: [id number] SSN: [id number]  Date/Time: 2023    Subjective:     Pt states continues to c/o PIERSON. Assessment/Plan:    81year old female w/ h/o ESRD, COPD not on home 02, HFpEF admitted to the ED after presenting with c/o severe SOB and wheezing. -COPD w/ acute exacerbation: on IV steroid, bronchodilators, on supplemental 02. Has ambulatory hypoxia. HISTORY OF:  -HTN  -CAD  -HFpEF: last echo in 2022-->EF: 50-55%  -Type 2DM: detemir 15 units/day as outpt, here on lantus 5 units, blood sugars within acceptable limits for the most part. -CAD    PLAN:  -Cont bronchodilators, 02 support  -agree with oral steroid   -Cont HD  -Cont azithromycin  -Cont pt/ot    Dispo: Likely home tomorrow if tolerates steroid taper, able to ambulate w/o distress.   Additional Notes:      Case discussed with:  [x]Patient  []Family  []Nursing  []Case Management  DVT Prophylaxis:  []Lovenox  [x]Hep SQ  []SCDs  []Coumadin   []On Heparin gtt    Objective:   VS: Visit Vitals  BP (!) 144/6 (BP 1 Location: Left lower arm, BP Patient Position: Lying left side)   Pulse 65   Temp 98.2 °F (36.8 °C)   Resp 20   Ht 5' 4\" (1.626 m)   Wt 96.6 kg (213 lb)   SpO2 98%   Breastfeeding Unknown   BMI 36.56 kg/m²      Tmax/24hrs: Temp (24hrs), Av.2 °F (36.8 °C), Min:98.1 °F (36.7 °C), Max:98.3 °F (36.8 °C)    Input/Output: No intake or output data in the 24 hours ending 23 1603      General:  alert, awake, in nad  Cardiovascular:  no jvd, no peripheral edema  Pulmonary:  w/o increased wob, ctab  GI:  soft, nt, nd  Extremities:  no edema  Additional:      Labs:    Recent Results (from the past 24 hour(s))   GLUCOSE, POC    Collection Time: 01/15/23  9:16 PM   Result Value Ref Range    Glucose (POC) 199 (H) 70 - 110 mg/dL   CBC W/O DIFF    Collection Time: 23 12:52 AM   Result Value Ref Range    WBC 10.4 4.6 - 13.2 K/uL    RBC 3.38 (L) 4.20 - 5.30 M/uL    HGB 9.6 (L) 12.0 - 16.0 g/dL    HCT 31.6 (L) 35.0 - 45.0 %    MCV 93.5 78.0 - 100.0 FL    MCH 28.4 24.0 - 34.0 PG    MCHC 30.4 (L) 31.0 - 37.0 g/dL    RDW 18.5 (H) 11.6 - 14.5 %    PLATELET 389 416 - 003 K/uL    MPV 10.2 9.2 - 11.8 FL    NRBC 1.9 (H) 0  WBC    ABSOLUTE NRBC 0.20 (H) 0.00 - 0.01 K/uL   RENAL FUNCTION PANEL    Collection Time: 01/16/23 12:52 AM   Result Value Ref Range    Sodium 135 (L) 136 - 145 mmol/L    Potassium 4.7 3.5 - 5.5 mmol/L    Chloride 101 100 - 111 mmol/L    CO2 23 21 - 32 mmol/L    Anion gap 11 3.0 - 18 mmol/L    Glucose 205 (H) 74 - 99 mg/dL    BUN 80 (H) 7.0 - 18 MG/DL    Creatinine 3.03 (H) 0.6 - 1.3 MG/DL    BUN/Creatinine ratio 26 (H) 12 - 20      eGFR 16 (L) >60 ml/min/1.73m2    Calcium 8.8 8.5 - 10.1 MG/DL    Phosphorus 5.0 (H) 2.5 - 4.9 MG/DL    Albumin 3.3 (L) 3.4 - 5.0 g/dL   GLUCOSE, POC    Collection Time: 01/16/23  7:01 AM   Result Value Ref Range    Glucose (POC) 217 (H) 70 - 110 mg/dL   GLUCOSE, POC    Collection Time: 01/16/23 10:55 AM   Result Value Ref Range    Glucose (POC) 208 (H) 70 - 110 mg/dL     Additional Data Reviewed:      Signed By: Shaggy Pagan MD     January 16, 2023 3:05 PM

## 2023-01-16 NOTE — PROGRESS NOTES
Problem: Mobility Impaired (Adult and Pediatric)  Goal: *Acute Goals and Plan of Care (Insert Text)  Description: Physical Therapy Goals  Initiated 1/15/2023 and to be accomplished within 7 day(s)  1. Patient will move from supine to sit and sit to supine  in bed with modified independence. 2.  Patient will transfer from bed to chair and chair to bed with modified independence using the least restrictive device. 3.  Patient will perform sit to stand with modified independence. 4.  Patient will ambulate with modified independence for 100 feet with the least restrictive device. 5.  Patient will ascend/descend 3 stairs with (U) handrail(s) with modified independence. PLOF: Independent, occasionally used a SPC or RW if needed.  can assist.      Outcome: Progressing Towards Goal     PHYSICAL THERAPY TREATMENT    Patient: Fariha Allen (28 y.o. female)  Date: 1/16/2023  Diagnosis: COPD exacerbation ([facility] [address] [J44.1] COPD with acute exacerbation ([facility] [address]      Precautions: Fall  PLOF: see above     ASSESSMENT:  Pt received in bed in NAD and agreeable. Pt on 2L via NC, maintained >95% with activity this date. Pt mod ind with bed mob and sit <> stand with no AD. Pt amb approx 100 ft in hallway, slow speed noted. Pt intermittently reaching for hannon rail for support and educated on utilizing cane or walker for energy conservation and safety, pt reports she has both at home if needed. Pt with mild SOB after gait, educated on PLB while sitting EOB for recovery. At end of session, pt left in bed with all needs met and call bell within reach. Progression toward goals:   [x]      Improving appropriately and progressing toward goals  []      Improving slowly and progressing toward goals  []      Not making progress toward goals and plan of care will be adjusted     PLAN:  Patient continues to benefit from skilled intervention to address the above impairments. Continue treatment per established plan of care.     Further Equipment Recommendations for Discharge:  N/A- has cane and walker at home    AMPAC: 22/24    Current research shows that an AM-PAC score of 18 (14 without stairs) or greater is associated with a discharge to the patient's home setting. Based on an AM-PAC score of 18/24 (or **/20 if omitting stairs) and their current functional mobility deficits, it is recommended that the patient have 2-3 sessions per week of Physical Therapy at d/c to increase the patient's independence. This AMPAC score should be considered in conjunction with interdisciplinary team recommendations to determine the most appropriate discharge setting. Patient's social support, diagnosis, medical stability, and prior level of function should also be taken into consideration. SUBJECTIVE:   Patient stated I hope I can stay until tomorrow so I can get my dialysis here.     OBJECTIVE DATA SUMMARY:   Critical Behavior:  Neurologic State: Alert  Orientation Level: Oriented X4  Cognition: Follows commands     Functional Mobility Training:  Bed Mobility:     Supine to Sit: Modified independent  Sit to Supine: Modified independent            Transfers:  Sit to Stand: Supervision  Stand to Sit: Supervision                             Balance:  Sitting: Intact  Standing: Impaired; Without support  Standing - Static: Good  Standing - Dynamic : Fair         Ambulation/Gait Training:  Distance (ft): 100 Feet (ft)  Assistive Device:  (none- pt reaching intermittently for hannon rail)  Ambulation - Level of Assistance: Stand-by assistance;Supervision        Gait Abnormalities: Decreased step clearance              Speed/Venus: Pace decreased (<100 feet/min); Slow  Step Length: Left shortened;Right shortened    Pain:  Pain level pre-treatment: 0/10  Pain level post-treatment: 0/10   Pain Intervention(s): Medication (see MAR);  Rest, Ice, Repositioning   Response to intervention: Nurse notified    Activity Tolerance:   Good    Please refer to the flowsheet for vital signs taken during this treatment. After treatment:   [] Patient left in no apparent distress sitting up in chair  [x] Patient left in no apparent distress in bed  [x] Call bell left within reach  [x] Nursing notified  [] Caregiver present  [] Bed alarm activated  [] SCDs applied      COMMUNICATION/EDUCATION:   [x]         Role of Physical Therapy in the acute care setting. [x]         Fall prevention education was provided and the patient/caregiver indicated understanding. [x]         Patient/family have participated as able in working toward goals and plan of care. [x]         Patient/family agree to work toward stated goals and plan of care. []         Patient understands intent and goals of therapy, but is neutral about his/her participation. []         Patient is unable to participate in stated goals/plan of care: ongoing with therapy staff.  []         Other:        Torrey Payton   Time Calculation: 23 mins    [employer] AM-PAC® Basic Mobility Inpatient Short Form (6-Clicks) Version 2    How much HELP from another person does the patient currently need    (If the patient hasn't done an activity recently, how much help from another person do you think he/she would need if he/she tried?)   Total (Total A or Dep)   A Lot  (Mod to Max A)   A Little (Sup or Min A)   None (Mod I to I)   Turning from your back to your side while in a flat bed without using bedrails? [] 1 [] 2 [] 3 [x] 4   2. Moving from lying on your back to sitting on the side of a flat bed without using bedrails? [] 1 [] 2 [] 3 [x] 4   3. Moving to and from a bed to a chair (including a wheelchair)? [] 1 [] 2 [] 3 [x] 4   4. Standing up from a chair using your arms (e.g., wheelchair, or bedside chair)? [] 1 [] 2 [] 3 [x] 4   5. Walking in hospital room? [] 1 [] 2 [x] 3 [] 4   6. Climbing 3-5 steps with a railing?+   [] 1 [] 2 [x] 3 [] 4   +If stair climbing cannot be assessed, skip item #6.   Sum responses from items 1-5. Current research shows that an AM-PAC score of 18 (14 without stairs) or greater is associated with a discharge to the patient's home setting. Based on an AM-PAC score of 18/24 (or **/20 if omitting stairs) and their current functional mobility deficits, it is recommended that the patient have 2-3 sessions per week of Physical Therapy at d/c to increase the patient's independence.

## 2023-01-16 NOTE — PROGRESS NOTES
Walk Test placed back in patient's chart.                  Davin Nelson, RN  Case Management [phone]

## 2023-01-17 ENCOUNTER — APPOINTMENT (OUTPATIENT)
Dept: GENERAL RADIOLOGY | Age: 73
End: 2023-01-17
Attending: INTERNAL MEDICINE
Payer: MEDICARE

## 2023-01-17 LAB
ALBUMIN SERPL-MCNC: 3.2 G/DL (ref 3.4–5)
ALBUMIN SERPL-MCNC: 3.6 G/DL (ref 3.4–5)
ALBUMIN/GLOB SERPL: 1 (ref 0.8–1.7)
ALP SERPL-CCNC: 141 U/L (ref 45–117)
ALT SERPL-CCNC: 87 U/L (ref 13–56)
ANION GAP SERPL CALC-SCNC: 6 MMOL/L (ref 3–18)
AST SERPL-CCNC: 29 U/L (ref 10–38)
BASOPHILS # BLD: 0 K/UL (ref 0–0.1)
BASOPHILS NFR BLD: 0 % (ref 0–2)
BILIRUB DIRECT SERPL-MCNC: 0.2 MG/DL (ref 0–0.2)
BILIRUB SERPL-MCNC: 0.7 MG/DL (ref 0.2–1)
BUN SERPL-MCNC: 90 MG/DL (ref 7–18)
BUN/CREAT SERPL: 36 (ref 12–20)
CALCIUM SERPL-MCNC: 8.1 MG/DL (ref 8.5–10.1)
CHLORIDE SERPL-SCNC: 106 MMOL/L (ref 100–111)
CO2 SERPL-SCNC: 22 MMOL/L (ref 21–32)
CREAT SERPL-MCNC: 2.49 MG/DL (ref 0.6–1.3)
DIFFERENTIAL METHOD BLD: ABNORMAL
EOSINOPHIL # BLD: 0 K/UL (ref 0–0.4)
EOSINOPHIL NFR BLD: 0 % (ref 0–5)
ERYTHROCYTE [DISTWIDTH] IN BLOOD BY AUTOMATED COUNT: 18.6 % (ref 11.6–14.5)
GLOBULIN SER CALC-MCNC: 3.6 G/DL (ref 2–4)
GLUCOSE BLD STRIP.AUTO-MCNC: 139 MG/DL (ref 70–110)
GLUCOSE BLD STRIP.AUTO-MCNC: 264 MG/DL (ref 70–110)
GLUCOSE BLD STRIP.AUTO-MCNC: 285 MG/DL (ref 70–110)
GLUCOSE SERPL-MCNC: 129 MG/DL (ref 74–99)
HCT VFR BLD AUTO: 39.7 % (ref 35–45)
HGB BLD-MCNC: 12.3 G/DL (ref 12–16)
IMM GRANULOCYTES # BLD AUTO: 0.1 K/UL (ref 0–0.04)
IMM GRANULOCYTES NFR BLD AUTO: 1 % (ref 0–0.5)
LIPASE SERPL-CCNC: 426 U/L (ref 73–393)
LYMPHOCYTES # BLD: 0.7 K/UL (ref 0.9–3.6)
LYMPHOCYTES NFR BLD: 6 % (ref 21–52)
MCH RBC QN AUTO: 27.9 PG (ref 24–34)
MCHC RBC AUTO-ENTMCNC: 31 G/DL (ref 31–37)
MCV RBC AUTO: 90 FL (ref 78–100)
MONOCYTES # BLD: 0.8 K/UL (ref 0.05–1.2)
MONOCYTES NFR BLD: 7 % (ref 3–10)
NEUTS SEG # BLD: 9.8 K/UL (ref 1.8–8)
NEUTS SEG NFR BLD: 86 % (ref 40–73)
NRBC # BLD: 0.41 K/UL (ref 0–0.01)
NRBC BLD-RTO: 3.6 PER 100 WBC
PHOSPHATE SERPL-MCNC: 4.9 MG/DL (ref 2.5–4.9)
PLATELET # BLD AUTO: 345 K/UL (ref 135–420)
PMV BLD AUTO: 9.7 FL (ref 9.2–11.8)
POTASSIUM SERPL-SCNC: 4.8 MMOL/L (ref 3.5–5.5)
PROT SERPL-MCNC: 7.2 G/DL (ref 6.4–8.2)
RBC # BLD AUTO: 4.41 M/UL (ref 4.2–5.3)
SODIUM SERPL-SCNC: 134 MMOL/L (ref 136–145)
WBC # BLD AUTO: 11.5 K/UL (ref 4.6–13.2)

## 2023-01-17 PROCEDURE — 90935 HEMODIALYSIS ONE EVALUATION: CPT

## 2023-01-17 PROCEDURE — 74011250637 HC RX REV CODE- 250/637: Performed by: INTERNAL MEDICINE

## 2023-01-17 PROCEDURE — 85025 COMPLETE CBC W/AUTO DIFF WBC: CPT

## 2023-01-17 PROCEDURE — 74011250636 HC RX REV CODE- 250/636: Performed by: INTERNAL MEDICINE

## 2023-01-17 PROCEDURE — 97530 THERAPEUTIC ACTIVITIES: CPT

## 2023-01-17 PROCEDURE — 83690 ASSAY OF LIPASE: CPT

## 2023-01-17 PROCEDURE — 74011636637 HC RX REV CODE- 636/637: Performed by: PHYSICIAN ASSISTANT

## 2023-01-17 PROCEDURE — 74011250636 HC RX REV CODE- 250/636: Performed by: PHYSICIAN ASSISTANT

## 2023-01-17 PROCEDURE — 74022 RADEX COMPL AQT ABD SERIES: CPT

## 2023-01-17 PROCEDURE — 74011250637 HC RX REV CODE- 250/637: Performed by: HOSPITALIST

## 2023-01-17 PROCEDURE — 65270000046 HC RM TELEMETRY

## 2023-01-17 PROCEDURE — 74011000250 HC RX REV CODE- 250: Performed by: PHYSICIAN ASSISTANT

## 2023-01-17 PROCEDURE — 74011250637 HC RX REV CODE- 250/637: Performed by: PHYSICIAN ASSISTANT

## 2023-01-17 PROCEDURE — 74011636637 HC RX REV CODE- 636/637: Performed by: INTERNAL MEDICINE

## 2023-01-17 PROCEDURE — 80076 HEPATIC FUNCTION PANEL: CPT

## 2023-01-17 PROCEDURE — 36415 COLL VENOUS BLD VENIPUNCTURE: CPT

## 2023-01-17 PROCEDURE — 99232 SBSQ HOSP IP/OBS MODERATE 35: CPT | Performed by: INTERNAL MEDICINE

## 2023-01-17 PROCEDURE — 77010033678 HC OXYGEN DAILY

## 2023-01-17 PROCEDURE — 80069 RENAL FUNCTION PANEL: CPT

## 2023-01-17 PROCEDURE — 82962 GLUCOSE BLOOD TEST: CPT

## 2023-01-17 RX ORDER — BISACODYL 5 MG
5 TABLET, DELAYED RELEASE (ENTERIC COATED) ORAL DAILY PRN
Status: COMPLETED | OUTPATIENT
Start: 2023-01-17 | End: 2023-01-17

## 2023-01-17 RX ADMIN — ASPIRIN 81 MG CHEWABLE TABLET 162 MG: 81 TABLET CHEWABLE at 08:57

## 2023-01-17 RX ADMIN — HEPARIN SODIUM 5000 UNITS: 5000 INJECTION INTRAVENOUS; SUBCUTANEOUS at 18:28

## 2023-01-17 RX ADMIN — TRAMADOL HYDROCHLORIDE 50 MG: 50 TABLET, COATED ORAL at 18:27

## 2023-01-17 RX ADMIN — ATORVASTATIN CALCIUM 80 MG: 40 TABLET, FILM COATED ORAL at 21:53

## 2023-01-17 RX ADMIN — GUAIFENESIN 600 MG: 600 TABLET, EXTENDED RELEASE ORAL at 08:55

## 2023-01-17 RX ADMIN — Medication 5 MG: at 21:53

## 2023-01-17 RX ADMIN — BISACODYL 5 MG: 5 TABLET, COATED ORAL at 18:27

## 2023-01-17 RX ADMIN — Medication 5 UNITS: at 22:00

## 2023-01-17 RX ADMIN — GUAIFENESIN 600 MG: 600 TABLET, EXTENDED RELEASE ORAL at 21:53

## 2023-01-17 RX ADMIN — AZITHROMYCIN MONOHYDRATE 500 MG: 500 INJECTION, POWDER, LYOPHILIZED, FOR SOLUTION INTRAVENOUS at 18:28

## 2023-01-17 RX ADMIN — PREDNISONE 40 MG: 20 TABLET ORAL at 08:55

## 2023-01-17 RX ADMIN — POLYETHYLENE GLYCOL 3350 17 G: 17 POWDER, FOR SOLUTION ORAL at 04:17

## 2023-01-17 RX ADMIN — EPOETIN ALFA-EPBX 4000 UNITS: 4000 INJECTION, SOLUTION INTRAVENOUS; SUBCUTANEOUS at 21:53

## 2023-01-17 RX ADMIN — Medication 9 UNITS: at 18:26

## 2023-01-17 RX ADMIN — TRAMADOL HYDROCHLORIDE 50 MG: 50 TABLET, COATED ORAL at 11:32

## 2023-01-17 RX ADMIN — METOPROLOL TARTRATE 25 MG: 25 TABLET, FILM COATED ORAL at 18:27

## 2023-01-17 RX ADMIN — TRAMADOL HYDROCHLORIDE 50 MG: 50 TABLET, COATED ORAL at 04:17

## 2023-01-17 RX ADMIN — Medication 9 UNITS: at 21:56

## 2023-01-17 RX ADMIN — EZETIMIBE 10 MG: 10 TABLET ORAL at 08:55

## 2023-01-17 RX ADMIN — GABAPENTIN 400 MG: 400 CAPSULE ORAL at 08:55

## 2023-01-17 RX ADMIN — SODIUM CHLORIDE, PRESERVATIVE FREE 10 ML: 5 INJECTION INTRAVENOUS at 21:53

## 2023-01-17 NOTE — PROGRESS NOTES
Received email from [address] stating they were having trouble contacting pt/family regarding O2 delivery. Phone numbers provided. Spoke with patient's  and he states the O2 company reached out to him and they are planning on delivering the O2 at 12 noon today to patient's home.     [facility] RN CDCES  Case Management

## 2023-01-17 NOTE — PROGRESS NOTES
Spoke with pt and she states her  received the home oxygen. Patient states she isn't sure that she is going home today r/t abdominal pain on her lower left side. Patient states xrays were done. Awaiting results.     [facility] RN CDCES  Case Management

## 2023-01-17 NOTE — PROGRESS NOTES
Unable to see patient for skilled OT treatment session as patient off the unit. Will continue to follow and see patient when available/as appropriate.             Thank you,   Melanie Shine MS, OTR/L

## 2023-01-17 NOTE — ROUTINE PROCESS
Bedside and Verbal shift change report given to FLORENCIA Martinez (oncoming nurse) by Chloé Clay RN (offgoing nurse). Report included the following information SBAR, Kardex, and Recent Results.

## 2023-01-17 NOTE — PROGRESS NOTES
Pt not seen for skilled PT due to:  [ ]  Nausea/vomiting  [ ]  Eating  [ ]  Pain  [ ]  Pt lethargic  [ x]  Off Unit for HD x2 attempts  Will f/u later as schedule allows. Thank you.   Johnathan Montenegro PT, DPT

## 2023-01-17 NOTE — PROGRESS NOTES
ACUTE HEMODIALYSIS FLOW SHEET      HEMODIALYSIS ORDERS: Physician: Vanessa Stark                              [x]   Thony Consent Verified                   GENERAL ASSESSMENT:    LUNGS:  Resp Rate 16   [x] Clear  [] Coarse  [] Crackles  [] Wheezing  [] Diminished                                                           [] RLL   [] LLL  [] RUL   [] ISAURO            Respirations:  [x]Easy  []Labored  []N/A  Cough:  []Productive  []Dry  []N/A               Therapy:  []RA   [] Ventilated   [] Intubated   [] Trach            O2 Device:  [x] NC   [] NRB  [] Trach Mask  [] BiPaP  Flow:  2 l/min                                                    CARDIAC: [x] Regular      [] Irregular   [] Rhythm:          [] Monitored   [] Bedside   [] Remotely monitored       EDEMA: [] None   [x]Generalized  [] Pitting [] 1+   [] 2 +   [] 3+    [] 4+        SKIN:   [] Hot     [] Cold    [x] Warm   [] Dry    [] Diaphoretic                 [] Flushed  [] Jaundiced  [] Cyanotic  [] Pale      LOC:    [x] Alert      [x]Oriented:    [x] Person     [x] Place   [x]Time               [] Confused  [] Lethargic  [] Medicated  [] Non-responsive  [] Non-Verbal     GI / ABDOMEN:                     [] Flat    [] Distended    [] Soft    [] Firm   []  Obese                   [] Diarrhea   [] FMS [] Bowel Sounds  [] Nausea  [] Vomiting                   [] NGT  [] OGT  [] PEG  [] Tube Feedings @     mL/hr     / URINE ASSESSMENT:                   [x] Voiding    [] Oliguria  [] Anuria                     []  Frye   [] Incontinent  []  Incontinent Brief   []  PureWick     PAIN:  [x] 0 []1  []2   []3   []4   []5   []6   []7   []8   []9   []10                MOBILITY:  [x] Bed    [] Stretcher      All Vitals and Treatment Details on Attached [address]: [facility]          Room # 258/17    [x] Routine         [] 1st Time Acute/Chronic   [] Urgent      [] Stat              [x] Acute Room   []  Bedside    [] ICU/CCU     [] ER     Isolation Precautions:  [x] Dialysis    There are currently no Active Isolations     ALLERGIES:     No Known Allergies     Code Status:  Full Code     Hepatitis Status      Lab Results   Component Value Date/Time    Hepatitis A, IgM NEGATIVE 03/23/2015 09:04 AM    Hepatitis B surface Ag <0.10 12/09/2022 07:03 PM    Hepatitis B surface Ab >1,000.00 12/09/2022 07:03 PM    Hepatitis B core, IgM NEGATIVE 03/23/2015 09:04 AM    Hepatitis C virus Ab 0.09 03/23/2015 09:04 AM        Current Labs:      Lab Results   Component Value Date/Time    WBC 10.4 01/16/2023 12:52 AM    HGB 9.6 (L) 01/16/2023 12:52 AM    HCT 31.6 (L) 01/16/2023 12:52 AM    PLATELET 523 10/53/7657 12:52 AM    MCV 93.5 01/16/2023 12:52 AM     Lab Results   Component Value Date/Time    Sodium 134 (L) 01/17/2023 02:38 AM    Potassium 4.8 01/17/2023 02:38 AM    Chloride 106 01/17/2023 02:38 AM    CO2 22 01/17/2023 02:38 AM    Anion gap 6 01/17/2023 02:38 AM    Glucose 129 (H) 01/17/2023 02:38 AM    BUN 90 (H) 01/17/2023 02:38 AM    Creatinine 2.49 (H) 01/17/2023 02:38 AM    BUN/Creatinine ratio 36 (H) 01/17/2023 02:38 AM    GFR est AA 54 (L) 09/22/2022 04:20 AM    GFR est non-AA 44 (L) 09/22/2022 04:20 AM    Calcium 8.1 (L) 01/17/2023 02:38 AM          DIET:  DIET ADULT  DIET ADULT ORAL NUTRITION SUPPLEMENT     PRIMARY NURSE REPORT:   Pre Dialysis: MANPREET Giraldo RN        EDUCATION:    [x] Patient           Knowledge Basis: []None []Minimal [] Substantial [] Unknown  Barriers to learning  [x]None  [] Intubated/Trached/Ventilated  [] Sedated/Paralyzed   [x] Access Care     [] S&S of infection  [] Fluid Management  [x] K+   [] Procedural    [] Medications   [] Tx Options   [] Transplant   [] Diet      Teaching Tools:  [x] Explain  [] Demo  [] Handouts [] Video  Patient response: [x] Verbalized understanding   [] Requires follow up        [x] Time Out/Safety Check    [x] Extracorporeal Circuit Tested for integrity           TREATMENT INITIATION - with Dialysis Precautions: [x] All Connections Secured              [x] Saline Line Double Clamped   [x] Venous Parameters Set               [x] Arterial Parameters Set    [x] Prime Given 250ml NSS              [x]Air Foam Detector Engaged                Medication    Dose    Volume Route      Time       [facility] Nurse      HD        HD         HD                  Post Assessment   Lungs: [] Clear                [x] No change from initial assessment    Cardiac:  [x] Regular   [] Irregular   Rhythm:  [] Monitored   [] Not Monitored     Edema:  [] None  [x] Generalized                     Skin:[x] Warm  [] Dry [] Diaphoretic               [] Flushed  [] Pale [] Cyanotic Pain:  [x]0  []1-2  []3-4  []5-6   []7-8  []9-10           Post Treatment Note:   Blood rinsed back, line clamped and capped, report to unit nurse             POST TREATMENT PRIMARY NURSE HANDOFF REPORT:   Post Dialysis: MANPREET Browning RN                Abbreviations: AVG-arterial venous graft, AVF-arterial venous fistula, IJ-Internal Jugular, Subcl-Subclavian, Fem-Femoral, Tx-treatment, AP/HR-apical heart rate, VSS- Vital Signs Stable, CVC- Central Venous Catheter, DFR-dialysate flow rate, BFR-blood flow rate, AP-arterial pressure, -venous pressure, UF-ultrafiltrate, TMP-transmembrane pressure, Evelio-Venous, Art-Arterial, RO-Reverse Osmosis

## 2023-01-17 NOTE — DIALYSIS
ACUTE HEMODIALYSIS FLOW SHEET    HEMODIALYSIS ORDERS: Physician: Dr. Maame Daniels: Primo   Duration: 3.5 hr   BFR: 300   DFR: 500   Dialysate:  Temp 36-37*C   K+  2    Ca+ 2.5   Na 138   Bicarb 35   Wt Readings from Last 1 Encounters:   01/13/23 96.6 kg (213 lb)    Patient Chart [x]   Unable to Obtain []  Dry weight/UF Goal: 3000 ml    Heparin []  Bolus    Units    [] Hourly    Units    [x]None       Pre BP: 169/79  Pulse: 85  Respirations: 18 Temp: 97.1 temporal  [] Oral  [] Ax  [] Esoph   Labs: []  Pre  []  Post:   [x] N/A   Additional Orders (medications, blood products, hypotension management): [] Yes   [x] No     [x]  [facility] Consent Verified     CATHETER ACCESS:     [x]Right   []Left   []IJ   []Fem  [x]Chest wall  []TransHepatic   [] First use X-ray verified     [x]Tunnel    [] Non Tunneled   [x]No S/S infection  []Redness  []Drainage []Cultured []Swelling []Pain   [x]Medical Aseptic Prep Utilized   []Dressing Changed  01/17/2023 [] Biopatch  Date:    []Clotted   [x]Patent   Flows: [x]Good  []Poor  []Reversed   If access problem,  notified: []Yes    [x]N/A        GRAFT/FISTULA ACCESS:   [x]N/A                        Hospital: [facility]          Room # 197/89    [x] Routine         [] 1st Time Acute/Chronic   [] Urgent      [] Stat            [x] Acute Room   []  Bedside    [] ICU/CCU     [] ER     Isolation Precautions:  [x] Dialysis    There are currently no Active Isolations     ALLERGIES:     No Known Allergies     Code Status:  Full Code     Hepatitis Status      Lab Results   Component Value Date/Time    Hepatitis A, IgM NEGATIVE 03/23/2015 09:04 AM    Hepatitis B surface Ag <0.10 12/09/2022 07:03 PM    Hepatitis B surface Ab >1,000.00 12/09/2022 07:03 PM    Hepatitis B core, IgM NEGATIVE 03/23/2015 09:04 AM    Hepatitis C virus Ab 0.09 03/23/2015 09:04 AM        Current Labs:      Lab Results   Component Value Date/Time    WBC 10.4 01/16/2023 12:52 AM    HGB 9.6 (L) 01/16/2023 12:52 AM HCT 31.6 (L) 01/16/2023 12:52 AM    PLATELET 311 01/43/6964 12:52 AM    MCV 93.5 01/16/2023 12:52 AM     Lab Results   Component Value Date/Time    Sodium 134 (L) 01/17/2023 02:38 AM    Potassium 4.8 01/17/2023 02:38 AM    Chloride 106 01/17/2023 02:38 AM    CO2 22 01/17/2023 02:38 AM    Anion gap 6 01/17/2023 02:38 AM    Glucose 129 (H) 01/17/2023 02:38 AM    BUN 90 (H) 01/17/2023 02:38 AM    Creatinine 2.49 (H) 01/17/2023 02:38 AM    BUN/Creatinine ratio 36 (H) 01/17/2023 02:38 AM    GFR est AA 54 (L) 09/22/2022 04:20 AM    GFR est non-AA 44 (L) 09/22/2022 04:20 AM    Calcium 8.1 (L) 01/17/2023 02:38 AM          DIET:  DIET ADULT  DIET ADULT ORAL NUTRITION SUPPLEMENT     PRIMARY NURSE REPORT:   Pre Dialysis: Gregoria Parks RN    Time: 0845      EDUCATION:    [x] Patient           Knowledge Basis: []None []Minimal [x] Substantial [] Unknown  Barriers to learning  [x]None  [] Intubated/Trached/Ventilated  [] Sedated/Paralyzed   [x] Access Care     [] S&S of infection  [] Fluid Management  [] K+   [x] Procedural        Teaching Tools:  [x] Explain  [] Demo  [] Handouts [] Video  Patient response: [x] Verbalized understanding   [] Requires follow up        [x] Time Out/Safety Check    [x] Extracorporeal Circuit Tested for integrity       RO/HEMODIALYSIS MACHINE SAFETY CHECKS - Before each treatment:        [address] [place] [address]                                     [x] Unit Machine # 7 with centralized RO                                  [] Portable Machine #1/RO serial # [id number]                                  [] Portable Machine #2/RO serial # [id number]                                  [] Portable Machine #4/RO serial # [id number]                                  [] Portable Machine #10/RO serial # [id number]                                                                                                       Alarm Test:  Pass time 3446            [x] RO/Machine Log Complete    Machine Temp    36-37*C Dialysate: pH  7.4    Conductivity: Meter 14.0    HD Machine  7     TCD: 13.9  Dialyzer Lot # [id number]     Blood Tubing Lot # [id number]     Saline Lot # [id number]     CHLORINE TESTING-Before each treatment and every 4 hours    Total Chlorine: [x] less than 0.1 ppm  Initial Time Check: 0830       4 Hr/2nd Check Time: 1230   (if greater than 0.1 ppm from Primary then every 30 minutes from Secondary)     TREATMENT INITIATION - with Dialysis Precautions:   [x] All Connections Secured              [x] Saline Line Double Clamped   [x] Venous Parameters Set               [x] Arterial Parameters Set    [x] Prime Given 250ml NSS              [x]Air Foam Detector Engaged        Treatment Initiation Note:  SEE HD RN NOTE    During Treatment Notes:  4998  Face & Vascular access visible with art and adan line connections intact. Pt tolerating dialysis. 0915  Face & Vascular access visible with art and adan line connections intact. Pt tolerating dialysis. 0930  Face & Vascular access visible with art and adan line connections intact. Pt tolerating dialysis. 0945  Face & Vascular access visible with art and adan line connections intact. Pt tolerating dialysis. 1000  Face & Vascular access visible with art and adan line connections intact. Pt tolerating dialysis. 1015  Face & Vascular access visible with art and adan line connections intact. Pt tolerating dialysis. 1030  Face & Vascular access visible with art and adan line connections intact. Pt tolerating dialysis. 1045  Face & Vascular access visible with art and adan line connections intact. Pt tolerating dialysis. 1100  Face & Vascular access visible with art and adan line connections intact. Pt tolerating dialysis. 1115  Face & Vascular access visible with art and adan line connections intact. Pt tolerating dialysis. 1130  Face & Vascular access visible with art and adan line connections intact. Pt tolerating dialysis.   1145  Face & Vascular access visible with art and adan line connections intact. Pt tolerating dialysis. 1200  Face & Vascular access visible with art and adan line connections intact. Pt tolerating dialysis. 1250  Dialysis treatment complete.            Post Assessment  Dialyzer Cleared:   [x] Good  [] Fair  [] Poor  Blood processed:  66.9 L  UF Removed:  3000 Ml    Post BP: 147/72  Pulse: 83  Respirations: 18   Temp: 97.2 temporal  [] Oral  [] Ax  [] Esophageal   Lungs: [] Clear                [x] No change from initial assessment   Post Tx Vascular Access: [x] N/A        Cardiac:  [x] Regular   [] Irregular   Rhythm:  [] Monitored   [x] Not Monitored    CVC Catheter:   Locking solution: Heparin 1:1000 U  Arterial port 1.9 ml   Venous port 1.9 ml   Edema:  [x] None  [] Generalized                     Skin:[] Warm  [x] Dry [] Diaphoretic               [] Flushed  [] Pale [] Cyanotic Pain:  []0  []1-2  []3-4  []5-6   []7-8  []9-10         Post Treatment Note:  SEE HD RN NOTE     POST TREATMENT PRIMARY NURSE HANDOFF REPORT:   Post Dialysis: Lexis Pritchett RN        Time:  1330       Abbreviations: AVG-arterial venous graft, AVF-arterial venous fistula, IJ-Internal Jugular, Subcl-Subclavian, Fem-Femoral, Tx-treatment, AP/HR-apical heart rate, VSS- Vital Signs Stable, CVC- Central Venous Catheter, DFR-dialysate flow rate, BFR-blood flow rate, AP-arterial pressure, -venous pressure, UF-ultrafiltrate, TMP-transmembrane pressure, Adan-Venous, Art-Arterial, RO-Reverse Osmosis

## 2023-01-17 NOTE — PROGRESS NOTES
Problem: Self Care Deficits Care Plan (Adult)  Goal: *Acute Goals and Plan of Care (Insert Text)  Description: Occupational Therapy Goals  Initiated 1/15/2023 within 7 day(s). 1.  Patient will perform lower body dressing with modified independence and no increased SOB  2. Patient will perform bathing with modified independence and no increased SOB  3. Patient will maneuver on and off BSC with modified independence and no increased SOB  4. Patient will demonstrate independence with upper trunk alignment, pacing and modified proper breathing techniques in preparation for her efficient completion of her self care routine    Prior Level of Function: she reports she is independent with her self care and has significant SOB and difficulty with task completion. She lives with her  who assists her with her IADL's     Outcome: Progressing Towards Goal         OCCUPATIONAL THERAPY TREATMENT    Patient: James Velasco (40 y.o. female)  Date: 1/17/2023  Diagnosis: COPD exacerbation ([facility] [address] [J44.1] COPD with acute exacerbation ([facility] [address]    Precautions: Fall    Chart, occupational therapy assessment, plan of care, and goals were reviewed. ASSESSMENT:  Upon entering the room, the patient was in fetal position in bed, alert, and reporting 10/10 bilateral neck and 10/10 LLQ abdominal pain. Patient reporting she had medication in dialysis approx. 1 hour ago however did not work. RN Lorrei Harrington) notified of remaining pain. Patient agreeable to repositioning to assist with pain management and agreeable to hot pack. Patient able to demonstrate good pursed lip breathing for pain management at bed level, stand by assist for bed mobility. Patient vitals assessed this session and patient O2 99% with 2L NC donned, HR 90 bpm and /100 (RN also notified of BP). Patient requesting OOB activity next date pending pain and declining ADLs as she performed morning routine prior to HD.  Patient left comfortably in bed with nursing student present and RN entering room. Progression toward goals:  []          Improving appropriately and progressing toward goals  [x]          Improving slowly and progressing toward goals  []          Not making progress toward goals and plan of care will be adjusted     PLAN:  Patient continues to benefit from skilled intervention to address the above impairments. Continue treatment per established plan of care. Further Equipment Recommendations for Discharge:  N/A    AMPAC: Current research shows that an AM-PAC score of 18 or greater is associated with a discharge to the patient's home setting. Based on an AM-PAC score of 19/24 and their current ADL deficits; it is recommended that the patient have 2-3 sessions per week of Occupational Therapy at d/c to increase the patient's independence. This AMPAC score should be considered in conjunction with interdisciplinary team recommendations to determine the most appropriate discharge setting. Patient's social support, diagnosis, medical stability, and prior level of function should also be taken into consideration. SUBJECTIVE:   Patient stated It started last night and hasn't got better    OBJECTIVE DATA SUMMARY:   Cognitive/Behavioral Status:  Neurologic State: Alert  Orientation Level: Oriented X4  Cognition: Follows commands  Safety/Judgement: Fall prevention    Functional Mobility and Transfers for ADLs:   Bed Mobility:  Rolling: Stand-by assistance  Scooting: Stand-by assistance     ADL Intervention:  Patient declining ADLs at this time d/t performing this morning    Cognitive Retraining  Safety/Judgement: Fall prevention    Pain:  Pain level pre-treatment: 10/10 , neck and stomach  Pain level post-treatment: 10/10  Pain Intervention(s): Medication (see MAR);  Rest, Ice, Repositioning   Response to intervention: Nurse notified, See doc flow    Activity Tolerance:    Patient demonstrated decreased activity tolerance during OT session d/t increased neck/stomach pain. Please refer to the flowsheet for vital signs taken during this treatment. After treatment:   []  Patient left in no apparent distress sitting up in chair  [x]  Patient left in no apparent distress in bed  [x]  Call bell left within reach  [x]  Nursing notified  [x]  Nursing present  []  Bed alarm activated    COMMUNICATION/EDUCATION:   [x] Role of Occupational Therapy in the acute care setting  [x] Home safety education was provided and the patient/caregiver indicated understanding. [x] Patient/family have participated as able in working towards goals and plan of care. [x] Patient/family agree to work toward stated goals and plan of care. [] Patient understands intent and goals of therapy, but is neutral about his/her participation. [] Patient is unable to participate in goal setting and plan of care. Thank you for this referral.  Martin Chow, OTR/L  Time Calculation: 14 mins    [employer] AM-PAC® Daily Activity Inpatient Short Form (6-Clicks)*    How much HELP from another person does the patient currently need    (If the patient hasn't done an activity recently, how much help from another person do you think he/she would need if he/she tried?)   Total (Total A or Dep)   A Lot  (Mod to Max A)   A Little (Sup or Min A)   None (Mod I to I)   Putting on and taking off regular lower body clothing? [] 1 [] 2 [x] 3 [] 4   2. Bathing (including washing, rinsing,      drying)? [] 1 [] 2 [x] 3 [] 4   3. Toileting, which includes using toilet, bedpan or urinal?   [] 1 [] 2 [x] 3 [] 4   4. Putting on and taking off regular upper body clothing? [] 1 [] 2 [x] 3 [] 4   5. Taking care of personal grooming such as brushing teeth? [] 1 [] 2 [x] 3 [] 4   6. Eating meals?    [] 1 [] 2 [] 3 [x] 4

## 2023-01-17 NOTE — PROGRESS NOTES
RENAL DAILY PROGRESS NOTE              Subjective:       Complaint:   Overnight events noted  no nausea, vomiting, chest pain  Breathing is better  Has LLQ abdominal pain which is new and started last pm-worsening overnight  At 10:44 AM on 1/17/2023, I saw and examined patient during hemodialysis treatment. The patient was receiving hemodialysis for treatment of  renal failure. I have also reviewed vital signs, intake and output, lab results and recent events, and agreed with today's dialysis order. IMPRESSION:   IMPRESSION:   ESRD  Access: TDC,maturing av fistula on right  arm(placed 3 weeks ago-awaiting rpt procedure for superfilization)   COPD excerebration. CAD,cardiomyopathy  Secondary hyperparathyroidism  ACD   PLAN:   HD on schedule- TTS. Seen on dialysis. Tolerating well  C/w epogen  Phos is controlled. Will let primary team know about her abdominal pain. Might need imaging. Avoid Gadolinium due to its association with nephrogenic systemic fibrosis in a patients with severe ARF and ESRD. Please dose all medications for creatinine clearance <15/dialysis. Avoid blood pressure checks, blood draws, peripheral iv's on arm with access.                  Current Facility-Administered Medications   Medication Dose Route Frequency    predniSONE (DELTASONE) tablet 40 mg  40 mg Oral DAILY WITH BREAKFAST    sodium chloride (OCEAN) 0.65 % nasal squeeze bottle 2 Spray  2 Spray Both Nostrils Q2H PRN    ipratropium (ATROVENT) 0.02 % nebulizer solution 0.5 mg  0.5 mg Nebulization Q6H RT    traMADoL (ULTRAM) tablet 50 mg  50 mg Oral Q6H PRN    melatonin tablet 5 mg  5 mg Oral QHS    heparin (porcine) injection 5,000 Units  5,000 Units SubCUTAneous Q8H    epoetin tonny-epbx (RETACRIT) injection 4,000 Units  4,000 Units SubCUTAneous Q TUE, THU & SAT    aspirin chewable tablet 162 mg  162 mg Oral DAILY    atorvastatin (LIPITOR) tablet 80 mg  80 mg Oral QHS    ezetimibe (ZETIA) tablet 10 mg  10 mg Oral DAILY gabapentin (NEURONTIN) capsule 400 mg  400 mg Oral DAILY    sodium chloride (NS) flush 5-40 mL  5-40 mL IntraVENous Q8H    sodium chloride (NS) flush 5-40 mL  5-40 mL IntraVENous PRN    acetaminophen (TYLENOL) tablet 650 mg  650 mg Oral Q6H PRN    Or    acetaminophen (TYLENOL) suppository 650 mg  650 mg Rectal Q6H PRN    polyethylene glycol (MIRALAX) packet 17 g  17 g Oral DAILY PRN    bisacodyL (DULCOLAX) suppository 10 mg  10 mg Rectal DAILY PRN    ondansetron (ZOFRAN ODT) tablet 4 mg  4 mg Oral Q8H PRN    Or    ondansetron (ZOFRAN) injection 4 mg  4 mg IntraVENous Q6H PRN    arformoteroL (BROVANA) neb solution 15 mcg  15 mcg Nebulization BID RT    budesonide (PULMICORT) 500 mcg/2 ml nebulizer suspension  500 mcg Nebulization BID RT    azithromycin (ZITHROMAX) 500 mg in 0.9% sodium chloride 250 mL (VIAL-MATE)  500 mg IntraVENous Q24H    metoprolol tartrate (LOPRESSOR) tablet 25 mg  25 mg Oral BID    insulin lispro (HUMALOG) injection   SubCUTAneous AC&HS    glucose chewable tablet 16 g  4 Tablet Oral PRN    glucagon (GLUCAGEN) injection 1 mg  1 mg IntraMUSCular PRN    dextrose 10% infusion 0-250 mL  0-250 mL IntraVENous PRN    insulin glargine (LANTUS) injection 5 Units  5 Units SubCUTAneous QHS    guaiFENesin ER (MUCINEX) tablet 600 mg  600 mg Oral Q12H       Review of Symptoms: comprehensive ROS negative except above.    Objective:   Patient Vitals for the past 24 hrs:   Temp Pulse Resp BP SpO2   01/17/23 0945 -- 80 18 (!) 177/81 --   01/17/23 0930 -- 82 18 (!) 159/82 --   01/17/23 0915 -- 84 18 (!) 155/71 --   01/17/23 0910 97.1 °F (36.2 °C) 85 18 (!) 169/79 --   01/17/23 0834 97.2 °F (36.2 °C) 73 19 138/75 96 %   01/17/23 0424 98 °F (36.7 °C) 70 17 133/72 100 %   01/17/23 0018 98.1 °F (36.7 °C) 71 17 129/80 100 %   01/16/23 2039 97.9 °F (36.6 °C) 75 17 (!) 151/75 97 %   01/16/23 1936 -- -- -- -- 96 %   01/16/23 1637 (P) 97.9 °F (36.6 °C) (P) 73 (P) 20 (!) (P) 143/69 (P) 98 %   01/16/23 1100 98.2 °F (36.8 °C) 65 20 (!) 144/6 98 %          Weight change:      01/15 1901 - 01/17 0700  In: 250 [I.V.:250]  Out: -     Intake/Output Summary (Last 24 hours) at 1/17/2023 1043  Last data filed at 1/16/2023 1738  Gross per 24 hour   Intake 250 ml   Output --   Net 250 ml       Physical Exam:   General: comfortable, no acute distress   HEENT sclera anicteric, supple neck, no thyromegaly  CVS: S1S2 heard,  no rub  RS: + air entry b/l  Abd: Soft, tenderness in LLQ  Neuro: non focal, awake, alert , CN II-XII are grossly intact  Extrm: no edema, no cyanosis, clubbing   Skin: no visible  Rash  Musculoskeletal: No gross joints or bone deformities   Access: maturing right arm access      Data Review:     LABS:   Hematology:   Recent Labs     01/16/23  0052 01/15/23  0210   WBC 10.4 12.3   HGB 9.6* 10.5*   HCT 31.6* 33.8*       Chemistry:   Recent Labs     01/17/23  0238 01/16/23 0052 01/15/23  0210   BUN 90* 80* 43*   CREA 2.49* 3.03* 2.26*   CA 8.1* 8.8 9.5   ALB 3.2* 3.3* 3.6   K 4.8 4.7 4.4   * 135* 138    101 102   CO2 22 23 27   PHOS 4.9 5.0* 3.4   * 205* 224*              Procedures/imaging: see electronic medical records for all procedures, Xrays and details which were not copied into this note but were reviewed prior to creation of Plan          Assessment & Plan:       See above      Saundra Fortune MD  1/17/2023

## 2023-01-17 NOTE — PROGRESS NOTES
[facility] Hospitalist Group  Progress Note    Patient: Lesvia Lugo Age: 81 y.o. : 1950 MR#: [id number] SSN: [id number]  Date/Time: 2023    Subjective:     Pt tearful and complaining of left lower quadrant pain, onset yesterday evening worse today. Seen during HD. Assessment/Plan:    81year old female w/ h/o ESRD, COPD not on home 02, HFpEF admitted to the ED after presenting with c/o severe SOB and wheezing.    -Acute onset LLQ pain, started yesterday evening: states her last bm was yesterday, denies n/v. Never has had this pain. No clear alleviating/aggravating factors. Cause unclear. ?diverticulitis, but is w/o leukocytosis, not febrile. KUB negative for acute findings. -COPD w/ acute exacerbation: on IV steroid, bronchodilators, on supplemental 02. Has ambulatory hypoxia. HISTORY OF:  -HTN  -CAD  -HFpEF: last echo in 2022-->EF: 50-55%  -Type 2DM: detemir 15 units/day as outpt, here on lantus 5 units, blood sugars within acceptable limits for the most part. -CAD    PLAN:  -STAT ct abd pelvis, check lipase, LFT's for comprehensive eval  -Cont bronchodilators, 02 support  -agree with oral steroid   -Cont HD  -Cont azithromycin, day 4 today  -Cont pt/ot    Dispo: was planning for home dc today after home 02 delivered but now with acute onset abd pain, in process of work up.   Additional Notes:      Case discussed with:  [x]Patient  []Family  []Nursing  []Case Management  DVT Prophylaxis:  []Lovenox  [x]Hep SQ  []SCDs  []Coumadin   []On Heparin gtt    Objective:   VS: Visit Vitals  BP (!) 167/94   Pulse 90   Temp 97.2 °F (36.2 °C)   Resp 18   Ht 5' 4\" (1.626 m)   Wt 96.6 kg (213 lb)   SpO2 99%   Breastfeeding Unknown   BMI 36.56 kg/m²      Tmax/24hrs: Temp (24hrs), Av.6 °F (36.4 °C), Min:97.1 °F (36.2 °C), Max:98.1 °F (36.7 °C)    Input/Output:   Intake/Output Summary (Last 24 hours) at 2023 1622  Last data filed at 2023 1243  Gross per 24 hour Intake 250 ml   Output 2900 ml   Net -2650 ml         General:  alert, awake, in nad  Cardiovascular:  no jvd, no peripheral edema  Pulmonary:  w/o increased wob, ctab  GI:  soft, diffusely tender, no guarding, no rebound, hypoactive bowel sounds  Extremities:  no edema  Additional:      Labs:    Recent Results (from the past 24 hour(s))   GLUCOSE, POC    Collection Time: 01/16/23  4:33 PM   Result Value Ref Range    Glucose (POC) 273 (H) 70 - 110 mg/dL   GLUCOSE, POC    Collection Time: 01/16/23  9:06 PM   Result Value Ref Range    Glucose (POC) 242 (H) 70 - 110 mg/dL   RENAL FUNCTION PANEL    Collection Time: 01/17/23  2:38 AM   Result Value Ref Range    Sodium 134 (L) 136 - 145 mmol/L    Potassium 4.8 3.5 - 5.5 mmol/L    Chloride 106 100 - 111 mmol/L    CO2 22 21 - 32 mmol/L    Anion gap 6 3.0 - 18 mmol/L    Glucose 129 (H) 74 - 99 mg/dL    BUN 90 (H) 7.0 - 18 MG/DL    Creatinine 2.49 (H) 0.6 - 1.3 MG/DL    BUN/Creatinine ratio 36 (H) 12 - 20      eGFR 20 (L) >60 ml/min/1.73m2    Calcium 8.1 (L) 8.5 - 10.1 MG/DL    Phosphorus 4.9 2.5 - 4.9 MG/DL    Albumin 3.2 (L) 3.4 - 5.0 g/dL   GLUCOSE, POC    Collection Time: 01/17/23  6:55 AM   Result Value Ref Range    Glucose (POC) 139 (H) 70 - 110 mg/dL   CBC WITH AUTOMATED DIFF    Collection Time: 01/17/23  1:13 PM   Result Value Ref Range    WBC 11.5 4.6 - 13.2 K/uL    RBC 4.41 4.20 - 5.30 M/uL    HGB 12.3 12.0 - 16.0 g/dL    HCT 39.7 35.0 - 45.0 %    MCV 90.0 78.0 - 100.0 FL    MCH 27.9 24.0 - 34.0 PG    MCHC 31.0 31.0 - 37.0 g/dL    RDW 18.6 (H) 11.6 - 14.5 %    PLATELET 464 306 - 973 K/uL    MPV 9.7 9.2 - 11.8 FL    NRBC 3.6 (H) 0  WBC    ABSOLUTE NRBC 0.41 (H) 0.00 - 0.01 K/uL    NEUTROPHILS 86 (H) 40 - 73 %    LYMPHOCYTES 6 (L) 21 - 52 %    MONOCYTES 7 3 - 10 %    EOSINOPHILS 0 0 - 5 %    BASOPHILS 0 0 - 2 %    IMMATURE GRANULOCYTES 1 (H) 0.0 - 0.5 %    ABS. NEUTROPHILS 9.8 (H) 1.8 - 8.0 K/UL    ABS. LYMPHOCYTES 0.7 (L) 0.9 - 3.6 K/UL    ABS.  MONOCYTES 0.8 0.05 - 1.2 K/UL    ABS. EOSINOPHILS 0.0 0.0 - 0.4 K/UL    ABS. BASOPHILS 0.0 0.0 - 0.1 K/UL    ABS. IMM.  GRANS. 0.1 (H) 0.00 - 0.04 K/UL    DF AUTOMATED     GLUCOSE, POC    Collection Time: 01/17/23  3:46 PM   Result Value Ref Range    Glucose (POC) 285 (H) 70 - 110 mg/dL     Additional Data Reviewed:      Signed By: Shaggy Pagan MD     January 17, 2023 3:05 PM

## 2023-01-18 ENCOUNTER — ANESTHESIA (OUTPATIENT)
Dept: SURGERY | Age: 73
End: 2023-01-18
Payer: MEDICARE

## 2023-01-18 ENCOUNTER — APPOINTMENT (OUTPATIENT)
Dept: CT IMAGING | Age: 73
End: 2023-01-18
Attending: INTERNAL MEDICINE
Payer: MEDICARE

## 2023-01-18 ENCOUNTER — ANESTHESIA EVENT (OUTPATIENT)
Dept: SURGERY | Age: 73
End: 2023-01-18
Payer: MEDICARE

## 2023-01-18 LAB
ALBUMIN SERPL-MCNC: 3 G/DL (ref 3.4–5)
ALBUMIN SERPL-MCNC: 3.4 G/DL (ref 3.4–5)
ALBUMIN/GLOB SERPL: 1 (ref 0.8–1.7)
ALP SERPL-CCNC: 120 U/L (ref 45–117)
ALT SERPL-CCNC: 55 U/L (ref 13–56)
ANION GAP SERPL CALC-SCNC: 5 MMOL/L (ref 3–18)
ANION GAP SERPL CALC-SCNC: 6 MMOL/L (ref 3–18)
AST SERPL-CCNC: 11 U/L (ref 10–38)
BASOPHILS # BLD: 0 K/UL (ref 0–0.1)
BASOPHILS NFR BLD: 0 % (ref 0–2)
BILIRUB SERPL-MCNC: 0.7 MG/DL (ref 0.2–1)
BUN SERPL-MCNC: 49 MG/DL (ref 7–18)
BUN SERPL-MCNC: 53 MG/DL (ref 7–18)
BUN/CREAT SERPL: 26 (ref 12–20)
BUN/CREAT SERPL: 28 (ref 12–20)
CALCIUM SERPL-MCNC: 9.1 MG/DL (ref 8.5–10.1)
CALCIUM SERPL-MCNC: 9.4 MG/DL (ref 8.5–10.1)
CHLORIDE SERPL-SCNC: 104 MMOL/L (ref 100–111)
CHLORIDE SERPL-SCNC: 106 MMOL/L (ref 100–111)
CO2 SERPL-SCNC: 25 MMOL/L (ref 21–32)
CO2 SERPL-SCNC: 27 MMOL/L (ref 21–32)
CREAT SERPL-MCNC: 1.89 MG/DL (ref 0.6–1.3)
CREAT SERPL-MCNC: 1.91 MG/DL (ref 0.6–1.3)
DIFFERENTIAL METHOD BLD: ABNORMAL
EOSINOPHIL # BLD: 0 K/UL (ref 0–0.4)
EOSINOPHIL NFR BLD: 0 % (ref 0–5)
ERYTHROCYTE [DISTWIDTH] IN BLOOD BY AUTOMATED COUNT: 18.4 % (ref 11.6–14.5)
GLOBULIN SER CALC-MCNC: 3.4 G/DL (ref 2–4)
GLUCOSE BLD STRIP.AUTO-MCNC: 109 MG/DL (ref 70–110)
GLUCOSE BLD STRIP.AUTO-MCNC: 162 MG/DL (ref 70–110)
GLUCOSE BLD STRIP.AUTO-MCNC: 167 MG/DL (ref 70–110)
GLUCOSE BLD STRIP.AUTO-MCNC: 174 MG/DL (ref 70–110)
GLUCOSE BLD STRIP.AUTO-MCNC: 183 MG/DL (ref 70–110)
GLUCOSE SERPL-MCNC: 103 MG/DL (ref 74–99)
GLUCOSE SERPL-MCNC: 180 MG/DL (ref 74–99)
HCT VFR BLD AUTO: 36.6 % (ref 35–45)
HGB BLD-MCNC: 11.2 G/DL (ref 12–16)
IMM GRANULOCYTES # BLD AUTO: 0.1 K/UL (ref 0–0.04)
IMM GRANULOCYTES NFR BLD AUTO: 1 % (ref 0–0.5)
INR PPP: 1.1 (ref 0.8–1.2)
LYMPHOCYTES # BLD: 0.6 K/UL (ref 0.9–3.6)
LYMPHOCYTES NFR BLD: 5 % (ref 21–52)
MAGNESIUM SERPL-MCNC: 2.4 MG/DL (ref 1.6–2.6)
MCH RBC QN AUTO: 28.2 PG (ref 24–34)
MCHC RBC AUTO-ENTMCNC: 30.6 G/DL (ref 31–37)
MCV RBC AUTO: 92.2 FL (ref 78–100)
MONOCYTES # BLD: 0.5 K/UL (ref 0.05–1.2)
MONOCYTES NFR BLD: 4 % (ref 3–10)
NEUTS SEG # BLD: 11.7 K/UL (ref 1.8–8)
NEUTS SEG NFR BLD: 91 % (ref 40–73)
NRBC # BLD: 0.14 K/UL (ref 0–0.01)
NRBC BLD-RTO: 1.1 PER 100 WBC
PHOSPHATE SERPL-MCNC: 3.9 MG/DL (ref 2.5–4.9)
PLATELET # BLD AUTO: 378 K/UL (ref 135–420)
PMV BLD AUTO: 9.7 FL (ref 9.2–11.8)
POTASSIUM SERPL-SCNC: 4.8 MMOL/L (ref 3.5–5.5)
POTASSIUM SERPL-SCNC: 5.3 MMOL/L (ref 3.5–5.5)
PROT SERPL-MCNC: 6.8 G/DL (ref 6.4–8.2)
PROTHROMBIN TIME: 14.7 SEC (ref 11.5–15.2)
RBC # BLD AUTO: 3.97 M/UL (ref 4.2–5.3)
SODIUM SERPL-SCNC: 135 MMOL/L (ref 136–145)
SODIUM SERPL-SCNC: 138 MMOL/L (ref 136–145)
WBC # BLD AUTO: 12.9 K/UL (ref 4.6–13.2)

## 2023-01-18 PROCEDURE — 44139 MOBILIZATION OF COLON: CPT | Performed by: SURGERY

## 2023-01-18 PROCEDURE — 74011000258 HC RX REV CODE- 258: Performed by: SURGERY

## 2023-01-18 PROCEDURE — 80053 COMPREHEN METABOLIC PANEL: CPT

## 2023-01-18 PROCEDURE — 77030031139 HC SUT VCRL2 J&J -A: Performed by: SURGERY

## 2023-01-18 PROCEDURE — 74011636637 HC RX REV CODE- 636/637: Performed by: SURGERY

## 2023-01-18 PROCEDURE — 76210000015 HC REC RM PH I 9.5 TO 10 HR: Performed by: SURGERY

## 2023-01-18 PROCEDURE — C9113 INJ PANTOPRAZOLE SODIUM, VIA: HCPCS | Performed by: SURGERY

## 2023-01-18 PROCEDURE — 36415 COLL VENOUS BLD VENIPUNCTURE: CPT

## 2023-01-18 PROCEDURE — 74177 CT ABD & PELVIS W/CONTRAST: CPT

## 2023-01-18 PROCEDURE — 2709999900 HC NON-CHARGEABLE SUPPLY

## 2023-01-18 PROCEDURE — 74011636637 HC RX REV CODE- 636/637: Performed by: INTERNAL MEDICINE

## 2023-01-18 PROCEDURE — 74011000250 HC RX REV CODE- 250: Performed by: INTERNAL MEDICINE

## 2023-01-18 PROCEDURE — C9113 INJ PANTOPRAZOLE SODIUM, VIA: HCPCS | Performed by: EMERGENCY MEDICINE

## 2023-01-18 PROCEDURE — 0D1M0Z4 BYPASS DESCENDING COLON TO CUTANEOUS, OPEN APPROACH: ICD-10-PCS | Performed by: SURGERY

## 2023-01-18 PROCEDURE — 82962 GLUCOSE BLOOD TEST: CPT

## 2023-01-18 PROCEDURE — 44143 PARTIAL REMOVAL OF COLON: CPT | Performed by: SURGERY

## 2023-01-18 PROCEDURE — 99222 1ST HOSP IP/OBS MODERATE 55: CPT | Performed by: SURGERY

## 2023-01-18 PROCEDURE — 74011250637 HC RX REV CODE- 250/637: Performed by: INTERNAL MEDICINE

## 2023-01-18 PROCEDURE — 74011250636 HC RX REV CODE- 250/636

## 2023-01-18 PROCEDURE — 65270000029 HC RM PRIVATE

## 2023-01-18 PROCEDURE — 74011250636 HC RX REV CODE- 250/636: Performed by: NURSE ANESTHETIST, CERTIFIED REGISTERED

## 2023-01-18 PROCEDURE — 74011250636 HC RX REV CODE- 250/636: Performed by: SURGERY

## 2023-01-18 PROCEDURE — 74011000636 HC RX REV CODE- 636: Performed by: INTERNAL MEDICINE

## 2023-01-18 PROCEDURE — 76210000018 HC OR PH I REC EA ADDL 0.5 HR: Performed by: SURGERY

## 2023-01-18 PROCEDURE — 77030003028 HC SUT VCRL J&J -A: Performed by: SURGERY

## 2023-01-18 PROCEDURE — 83735 ASSAY OF MAGNESIUM: CPT

## 2023-01-18 PROCEDURE — 77030040830 HC CATH URETH FOL MDII -A: Performed by: SURGERY

## 2023-01-18 PROCEDURE — 77030013079 HC BLNKT BAIR HGGR 3M -A: Performed by: ANESTHESIOLOGY

## 2023-01-18 PROCEDURE — 94640 AIRWAY INHALATION TREATMENT: CPT

## 2023-01-18 PROCEDURE — 80069 RENAL FUNCTION PANEL: CPT

## 2023-01-18 PROCEDURE — 76010000149 HC OR TIME 1 TO 1.5 HR: Performed by: SURGERY

## 2023-01-18 PROCEDURE — 74011636637 HC RX REV CODE- 636/637: Performed by: NURSE ANESTHETIST, CERTIFIED REGISTERED

## 2023-01-18 PROCEDURE — 74011000250 HC RX REV CODE- 250: Performed by: SURGERY

## 2023-01-18 PROCEDURE — 74011000258 HC RX REV CODE- 258: Performed by: EMERGENCY MEDICINE

## 2023-01-18 PROCEDURE — 76060000033 HC ANESTHESIA 1 TO 1.5 HR: Performed by: SURGERY

## 2023-01-18 PROCEDURE — 74011000250 HC RX REV CODE- 250: Performed by: PHYSICIAN ASSISTANT

## 2023-01-18 PROCEDURE — 99232 SBSQ HOSP IP/OBS MODERATE 35: CPT | Performed by: EMERGENCY MEDICINE

## 2023-01-18 PROCEDURE — 0DTN0ZZ RESECTION OF SIGMOID COLON, OPEN APPROACH: ICD-10-PCS | Performed by: SURGERY

## 2023-01-18 PROCEDURE — 88307 TISSUE EXAM BY PATHOLOGIST: CPT

## 2023-01-18 PROCEDURE — 74011250637 HC RX REV CODE- 250/637: Performed by: HOSPITALIST

## 2023-01-18 PROCEDURE — 77030008683 HC TU ET CUF COVD -A: Performed by: ANESTHESIOLOGY

## 2023-01-18 PROCEDURE — 74011636637 HC RX REV CODE- 636/637: Performed by: STUDENT IN AN ORGANIZED HEALTH CARE EDUCATION/TRAINING PROGRAM

## 2023-01-18 PROCEDURE — 74011000250 HC RX REV CODE- 250: Performed by: NURSE ANESTHETIST, CERTIFIED REGISTERED

## 2023-01-18 PROCEDURE — 2709999900 HC NON-CHARGEABLE SUPPLY: Performed by: SURGERY

## 2023-01-18 PROCEDURE — 74011250637 HC RX REV CODE- 250/637: Performed by: PHYSICIAN ASSISTANT

## 2023-01-18 PROCEDURE — 77030002996 HC SUT SLK J&J -A: Performed by: SURGERY

## 2023-01-18 PROCEDURE — 77030040922 HC BLNKT HYPOTHRM STRY -A: Performed by: SURGERY

## 2023-01-18 PROCEDURE — 85025 COMPLETE CBC W/AUTO DIFF WBC: CPT

## 2023-01-18 PROCEDURE — 74011250636 HC RX REV CODE- 250/636: Performed by: EMERGENCY MEDICINE

## 2023-01-18 PROCEDURE — 77030040361 HC SLV COMPR DVT MDII -B: Performed by: SURGERY

## 2023-01-18 PROCEDURE — 99233 SBSQ HOSP IP/OBS HIGH 50: CPT | Performed by: INTERNAL MEDICINE

## 2023-01-18 PROCEDURE — 85610 PROTHROMBIN TIME: CPT

## 2023-01-18 RX ORDER — DEXTROSE MONOHYDRATE 100 MG/ML
0-250 INJECTION, SOLUTION INTRAVENOUS AS NEEDED
Status: DISCONTINUED | OUTPATIENT
Start: 2023-01-18 | End: 2023-01-18 | Stop reason: HOSPADM

## 2023-01-18 RX ORDER — HYDROCORTISONE SODIUM SUCCINATE 100 MG/2ML
100 INJECTION, POWDER, FOR SOLUTION INTRAMUSCULAR; INTRAVENOUS EVERY 6 HOURS
Status: DISCONTINUED | OUTPATIENT
Start: 2023-01-18 | End: 2023-01-20

## 2023-01-18 RX ORDER — IBUPROFEN 200 MG
4 TABLET ORAL AS NEEDED
Status: DISCONTINUED | OUTPATIENT
Start: 2023-01-18 | End: 2023-01-18 | Stop reason: HOSPADM

## 2023-01-18 RX ORDER — ONDANSETRON 2 MG/ML
INJECTION INTRAMUSCULAR; INTRAVENOUS AS NEEDED
Status: DISCONTINUED | OUTPATIENT
Start: 2023-01-18 | End: 2023-01-18 | Stop reason: HOSPADM

## 2023-01-18 RX ORDER — HYDROMORPHONE HYDROCHLORIDE 2 MG/ML
INJECTION, SOLUTION INTRAMUSCULAR; INTRAVENOUS; SUBCUTANEOUS AS NEEDED
Status: DISCONTINUED | OUTPATIENT
Start: 2023-01-18 | End: 2023-01-18 | Stop reason: HOSPADM

## 2023-01-18 RX ORDER — HYDROMORPHONE HYDROCHLORIDE 1 MG/ML
0.5 INJECTION, SOLUTION INTRAMUSCULAR; INTRAVENOUS; SUBCUTANEOUS ONCE
Status: COMPLETED | OUTPATIENT
Start: 2023-01-18 | End: 2023-01-18

## 2023-01-18 RX ORDER — HYDROMORPHONE HYDROCHLORIDE 1 MG/ML
1 INJECTION, SOLUTION INTRAMUSCULAR; INTRAVENOUS; SUBCUTANEOUS
Status: DISCONTINUED | OUTPATIENT
Start: 2023-01-18 | End: 2023-01-25 | Stop reason: HOSPADM

## 2023-01-18 RX ORDER — INSULIN LISPRO 100 [IU]/ML
INJECTION, SOLUTION INTRAVENOUS; SUBCUTANEOUS ONCE
Status: COMPLETED | OUTPATIENT
Start: 2023-01-18 | End: 2023-01-18

## 2023-01-18 RX ORDER — PANTOPRAZOLE SODIUM 40 MG/10ML
40 INJECTION, POWDER, LYOPHILIZED, FOR SOLUTION INTRAVENOUS EVERY 12 HOURS
Status: DISCONTINUED | OUTPATIENT
Start: 2023-01-18 | End: 2023-01-19 | Stop reason: SDUPTHER

## 2023-01-18 RX ORDER — GLYCOPYRROLATE 0.2 MG/ML
INJECTION INTRAMUSCULAR; INTRAVENOUS AS NEEDED
Status: DISCONTINUED | OUTPATIENT
Start: 2023-01-18 | End: 2023-01-18 | Stop reason: HOSPADM

## 2023-01-18 RX ORDER — SUCCINYLCHOLINE CHLORIDE 20 MG/ML
INJECTION INTRAMUSCULAR; INTRAVENOUS AS NEEDED
Status: DISCONTINUED | OUTPATIENT
Start: 2023-01-18 | End: 2023-01-18 | Stop reason: HOSPADM

## 2023-01-18 RX ORDER — ROCURONIUM BROMIDE 10 MG/ML
INJECTION, SOLUTION INTRAVENOUS AS NEEDED
Status: DISCONTINUED | OUTPATIENT
Start: 2023-01-18 | End: 2023-01-18 | Stop reason: HOSPADM

## 2023-01-18 RX ORDER — HYDROMORPHONE HYDROCHLORIDE 1 MG/ML
0.2 INJECTION, SOLUTION INTRAMUSCULAR; INTRAVENOUS; SUBCUTANEOUS
Status: DISCONTINUED | OUTPATIENT
Start: 2023-01-18 | End: 2023-01-18 | Stop reason: HOSPADM

## 2023-01-18 RX ORDER — FENTANYL CITRATE 50 UG/ML
INJECTION, SOLUTION INTRAMUSCULAR; INTRAVENOUS AS NEEDED
Status: DISCONTINUED | OUTPATIENT
Start: 2023-01-18 | End: 2023-01-18 | Stop reason: HOSPADM

## 2023-01-18 RX ORDER — ONDANSETRON 2 MG/ML
4 INJECTION INTRAMUSCULAR; INTRAVENOUS ONCE
Status: COMPLETED | OUTPATIENT
Start: 2023-01-18 | End: 2023-01-18

## 2023-01-18 RX ORDER — SODIUM CHLORIDE 9 MG/ML
50 INJECTION, SOLUTION INTRAVENOUS CONTINUOUS
Status: DISCONTINUED | OUTPATIENT
Start: 2023-01-18 | End: 2023-01-21

## 2023-01-18 RX ORDER — SODIUM CHLORIDE 9 MG/ML
25 INJECTION, SOLUTION INTRAVENOUS CONTINUOUS
Status: DISCONTINUED | OUTPATIENT
Start: 2023-01-18 | End: 2023-01-18 | Stop reason: HOSPADM

## 2023-01-18 RX ORDER — LIDOCAINE HYDROCHLORIDE 20 MG/ML
INJECTION, SOLUTION EPIDURAL; INFILTRATION; INTRACAUDAL; PERINEURAL AS NEEDED
Status: DISCONTINUED | OUTPATIENT
Start: 2023-01-18 | End: 2023-01-18 | Stop reason: HOSPADM

## 2023-01-18 RX ORDER — LABETALOL HYDROCHLORIDE 5 MG/ML
INJECTION, SOLUTION INTRAVENOUS AS NEEDED
Status: DISCONTINUED | OUTPATIENT
Start: 2023-01-18 | End: 2023-01-18 | Stop reason: HOSPADM

## 2023-01-18 RX ORDER — FENTANYL CITRATE 50 UG/ML
INJECTION, SOLUTION INTRAMUSCULAR; INTRAVENOUS
Status: COMPLETED
Start: 2023-01-18 | End: 2023-01-18

## 2023-01-18 RX ORDER — NEOSTIGMINE METHYLSULFATE 1 MG/ML
INJECTION, SOLUTION INTRAVENOUS AS NEEDED
Status: DISCONTINUED | OUTPATIENT
Start: 2023-01-18 | End: 2023-01-18 | Stop reason: HOSPADM

## 2023-01-18 RX ORDER — PROPOFOL 10 MG/ML
INJECTION, EMULSION INTRAVENOUS AS NEEDED
Status: DISCONTINUED | OUTPATIENT
Start: 2023-01-18 | End: 2023-01-18 | Stop reason: HOSPADM

## 2023-01-18 RX ORDER — MIDAZOLAM HYDROCHLORIDE 1 MG/ML
INJECTION, SOLUTION INTRAMUSCULAR; INTRAVENOUS AS NEEDED
Status: DISCONTINUED | OUTPATIENT
Start: 2023-01-18 | End: 2023-01-18 | Stop reason: HOSPADM

## 2023-01-18 RX ORDER — FENTANYL CITRATE 50 UG/ML
25 INJECTION, SOLUTION INTRAMUSCULAR; INTRAVENOUS AS NEEDED
Status: COMPLETED | OUTPATIENT
Start: 2023-01-18 | End: 2023-01-18

## 2023-01-18 RX ADMIN — METHYLPREDNISOLONE SODIUM SUCCINATE 40 MG: 40 INJECTION, POWDER, FOR SOLUTION INTRAMUSCULAR; INTRAVENOUS at 12:46

## 2023-01-18 RX ADMIN — HYDROMORPHONE HYDROCHLORIDE 1 MG: 1 INJECTION, SOLUTION INTRAMUSCULAR; INTRAVENOUS; SUBCUTANEOUS at 22:22

## 2023-01-18 RX ADMIN — SODIUM CHLORIDE, PRESERVATIVE FREE 10 ML: 5 INJECTION INTRAVENOUS at 05:24

## 2023-01-18 RX ADMIN — ROCURONIUM BROMIDE 10 MG: 50 INJECTION INTRAVENOUS at 17:19

## 2023-01-18 RX ADMIN — HYDROMORPHONE HYDROCHLORIDE 0.2 MG: 2 INJECTION, SOLUTION INTRAMUSCULAR; INTRAVENOUS; SUBCUTANEOUS at 17:59

## 2023-01-18 RX ADMIN — MIDAZOLAM HYDROCHLORIDE 0.5 MG: 2 INJECTION, SOLUTION INTRAMUSCULAR; INTRAVENOUS at 16:52

## 2023-01-18 RX ADMIN — HYDROMORPHONE HYDROCHLORIDE 0.2 MG: 1 INJECTION, SOLUTION INTRAMUSCULAR; INTRAVENOUS; SUBCUTANEOUS at 19:20

## 2023-01-18 RX ADMIN — FENTANYL CITRATE 25 MCG: 50 INJECTION, SOLUTION INTRAMUSCULAR; INTRAVENOUS at 17:17

## 2023-01-18 RX ADMIN — FENTANYL CITRATE 25 MCG: 50 INJECTION, SOLUTION INTRAMUSCULAR; INTRAVENOUS at 19:30

## 2023-01-18 RX ADMIN — FENTANYL CITRATE 25 MCG: 50 INJECTION, SOLUTION INTRAMUSCULAR; INTRAVENOUS at 17:02

## 2023-01-18 RX ADMIN — SODIUM CHLORIDE, PRESERVATIVE FREE 10 ML: 5 INJECTION INTRAVENOUS at 22:33

## 2023-01-18 RX ADMIN — METOPROLOL TARTRATE 25 MG: 25 TABLET, FILM COATED ORAL at 08:09

## 2023-01-18 RX ADMIN — ROCURONIUM BROMIDE 5 MG: 50 INJECTION INTRAVENOUS at 17:02

## 2023-01-18 RX ADMIN — Medication 3 UNITS: at 19:35

## 2023-01-18 RX ADMIN — ROCURONIUM BROMIDE 20 MG: 50 INJECTION INTRAVENOUS at 17:07

## 2023-01-18 RX ADMIN — GABAPENTIN 400 MG: 400 CAPSULE ORAL at 08:09

## 2023-01-18 RX ADMIN — SUCCINYLCHOLINE CHLORIDE 100 MG: 20 INJECTION, SOLUTION INTRAMUSCULAR; INTRAVENOUS at 17:02

## 2023-01-18 RX ADMIN — PANTOPRAZOLE SODIUM 40 MG: 40 INJECTION, POWDER, FOR SOLUTION INTRAVENOUS at 12:46

## 2023-01-18 RX ADMIN — Medication 2 UNITS: at 22:11

## 2023-01-18 RX ADMIN — HYDROCORTISONE SODIUM SUCCINATE 100 MG: 100 INJECTION, POWDER, FOR SOLUTION INTRAMUSCULAR; INTRAVENOUS at 23:37

## 2023-01-18 RX ADMIN — PREDNISONE 40 MG: 20 TABLET ORAL at 08:09

## 2023-01-18 RX ADMIN — ROCURONIUM BROMIDE 5 MG: 50 INJECTION INTRAVENOUS at 17:35

## 2023-01-18 RX ADMIN — PIPERACILLIN AND TAZOBACTAM 3.38 G: 3; .375 INJECTION, POWDER, FOR SOLUTION INTRAVENOUS at 22:07

## 2023-01-18 RX ADMIN — TRAMADOL HYDROCHLORIDE 50 MG: 50 TABLET, COATED ORAL at 08:09

## 2023-01-18 RX ADMIN — HYDROMORPHONE HYDROCHLORIDE 0.2 MG: 1 INJECTION, SOLUTION INTRAMUSCULAR; INTRAVENOUS; SUBCUTANEOUS at 19:10

## 2023-01-18 RX ADMIN — PROPOFOL 120 MG: 10 INJECTION, EMULSION INTRAVENOUS at 17:02

## 2023-01-18 RX ADMIN — LIDOCAINE HYDROCHLORIDE 80 MG: 20 INJECTION, SOLUTION EPIDURAL; INFILTRATION; INTRACAUDAL; PERINEURAL at 17:02

## 2023-01-18 RX ADMIN — ONDANSETRON 4 MG: 2 INJECTION INTRAMUSCULAR; INTRAVENOUS at 19:08

## 2023-01-18 RX ADMIN — Medication 3 UNITS: at 13:40

## 2023-01-18 RX ADMIN — HYDROMORPHONE HYDROCHLORIDE 0.5 MG: 1 INJECTION, SOLUTION INTRAMUSCULAR; INTRAVENOUS; SUBCUTANEOUS at 11:18

## 2023-01-18 RX ADMIN — PIPERACILLIN AND TAZOBACTAM 4.5 G: 4; .5 INJECTION, POWDER, FOR SOLUTION INTRAVENOUS at 12:46

## 2023-01-18 RX ADMIN — HYDROMORPHONE HYDROCHLORIDE 0.2 MG: 1 INJECTION, SOLUTION INTRAMUSCULAR; INTRAVENOUS; SUBCUTANEOUS at 20:30

## 2023-01-18 RX ADMIN — NEOSTIGMINE METHYLSULFATE 3 MG: 1 INJECTION, SOLUTION INTRAVENOUS at 17:55

## 2023-01-18 RX ADMIN — LABETALOL HYDROCHLORIDE 10 MG: 5 INJECTION, SOLUTION INTRAVENOUS at 18:04

## 2023-01-18 RX ADMIN — MIDAZOLAM HYDROCHLORIDE 0.5 MG: 2 INJECTION, SOLUTION INTRAMUSCULAR; INTRAVENOUS at 16:55

## 2023-01-18 RX ADMIN — EZETIMIBE 10 MG: 10 TABLET ORAL at 08:09

## 2023-01-18 RX ADMIN — GUAIFENESIN 600 MG: 600 TABLET, EXTENDED RELEASE ORAL at 08:08

## 2023-01-18 RX ADMIN — IPRATROPIUM BROMIDE 0.5 MG: 0.5 SOLUTION RESPIRATORY (INHALATION) at 01:25

## 2023-01-18 RX ADMIN — GLYCOPYRROLATE 0.4 MG: 0.2 INJECTION INTRAMUSCULAR; INTRAVENOUS at 17:55

## 2023-01-18 RX ADMIN — FENTANYL CITRATE 25 MCG: 50 INJECTION, SOLUTION INTRAMUSCULAR; INTRAVENOUS at 18:59

## 2023-01-18 RX ADMIN — SODIUM CHLORIDE, PRESERVATIVE FREE 10 ML: 5 INJECTION INTRAVENOUS at 13:03

## 2023-01-18 RX ADMIN — FENTANYL CITRATE 25 MCG: 50 INJECTION, SOLUTION INTRAMUSCULAR; INTRAVENOUS at 19:45

## 2023-01-18 RX ADMIN — FENTANYL CITRATE 25 MCG: 50 INJECTION, SOLUTION INTRAMUSCULAR; INTRAVENOUS at 17:10

## 2023-01-18 RX ADMIN — ARFORMOTEROL TARTRATE 15 MCG: 15 SOLUTION RESPIRATORY (INHALATION) at 11:21

## 2023-01-18 RX ADMIN — ONDANSETRON 4 MG: 2 INJECTION INTRAMUSCULAR; INTRAVENOUS at 17:22

## 2023-01-18 RX ADMIN — PANTOPRAZOLE SODIUM 40 MG: 40 INJECTION, POWDER, FOR SOLUTION INTRAVENOUS at 22:03

## 2023-01-18 RX ADMIN — SODIUM CHLORIDE 50 ML/HR: 9 INJECTION, SOLUTION INTRAVENOUS at 12:48

## 2023-01-18 RX ADMIN — IOPAMIDOL 80 ML: 612 INJECTION, SOLUTION INTRAVENOUS at 08:30

## 2023-01-18 RX ADMIN — PROPOFOL 30 MG: 10 INJECTION, EMULSION INTRAVENOUS at 17:03

## 2023-01-18 RX ADMIN — FENTANYL CITRATE 25 MCG: 50 INJECTION, SOLUTION INTRAMUSCULAR; INTRAVENOUS at 20:03

## 2023-01-18 RX ADMIN — FENTANYL CITRATE 25 MCG: 50 INJECTION, SOLUTION INTRAMUSCULAR; INTRAVENOUS at 17:23

## 2023-01-18 RX ADMIN — ASPIRIN 81 MG CHEWABLE TABLET 162 MG: 81 TABLET CHEWABLE at 08:09

## 2023-01-18 NOTE — PROGRESS NOTES
[facility] DAILY PROGRESS NOTE              Subjective:       Complaint:   Overnight events noted  no nausea, vomiting, chest pain  Breathing is better  Has LLQ abdominal pain which is worsening. Had CT abdomen which showed perforation. Going to OR  IMPRESSION:   IMPRESSION:   ESRD TTS  Access: TDC,maturing av fistula on right  arm(placed 3 weeks ago-awaiting rpt procedure for superfilization)   Abdominal perforation due to diverticulitis. COPD excerebration. CAD,cardiomyopathy  Secondary hyperparathyroidism  ACD   PLAN:   Will keep her on schedule with dialysis  Going to OR. C/w epogen  Phos is controlled. Will let primary team know about her abdominal pain. Might need imaging. Avoid Gadolinium due to its association with nephrogenic systemic fibrosis in a patients with severe ARF and ESRD. Please dose all medications for creatinine clearance <15/dialysis. Avoid blood pressure checks, blood draws, peripheral iv's on arm with access.    D/w                 Current Facility-Administered Medications   Medication Dose Route Frequency    piperacillin-tazobactam (ZOSYN) 4.5 g in 0.9% sodium chloride (MBP/ADV) 100 mL MBP  4.5 g IntraVENous NOW    methylPREDNISolone (PF) (SOLU-MEDROL) injection 40 mg  40 mg IntraVENous Q12H    pantoprazole (PROTONIX) injection 40 mg  40 mg IntraVENous Q12H    0.9% sodium chloride infusion  50 mL/hr IntraVENous CONTINUOUS    sodium chloride (OCEAN) 0.65 % nasal squeeze bottle 2 Spray  2 Spray Both Nostrils Q2H PRN    ipratropium (ATROVENT) 0.02 % nebulizer solution 0.5 mg  0.5 mg Nebulization Q6H RT    traMADoL (ULTRAM) tablet 50 mg  50 mg Oral Q6H PRN    [Held by provider] melatonin tablet 5 mg  5 mg Oral QHS    [Held by provider] heparin (porcine) injection 5,000 Units  5,000 Units SubCUTAneous Q8H    epoetin tonny-epbx (RETACRIT) injection 4,000 Units  4,000 Units SubCUTAneous Q TUE, THU & SAT    [Held by provider] aspirin chewable tablet 162 mg  162 mg Oral DAILY    [Held by provider] atorvastatin (LIPITOR) tablet 80 mg  80 mg Oral QHS    [Held by provider] ezetimibe (ZETIA) tablet 10 mg  10 mg Oral DAILY    [Held by provider] gabapentin (NEURONTIN) capsule 400 mg  400 mg Oral DAILY    sodium chloride (NS) flush 5-40 mL  5-40 mL IntraVENous Q8H    sodium chloride (NS) flush 5-40 mL  5-40 mL IntraVENous PRN    acetaminophen (TYLENOL) tablet 650 mg  650 mg Oral Q6H PRN    Or    acetaminophen (TYLENOL) suppository 650 mg  650 mg Rectal Q6H PRN    polyethylene glycol (MIRALAX) packet 17 g  17 g Oral DAILY PRN    bisacodyL (DULCOLAX) suppository 10 mg  10 mg Rectal DAILY PRN    ondansetron (ZOFRAN ODT) tablet 4 mg  4 mg Oral Q8H PRN    Or    ondansetron (ZOFRAN) injection 4 mg  4 mg IntraVENous Q6H PRN    arformoteroL (BROVANA) neb solution 15 mcg  15 mcg Nebulization BID RT    budesonide (PULMICORT) 500 mcg/2 ml nebulizer suspension  500 mcg Nebulization BID RT    azithromycin (ZITHROMAX) 500 mg in 0.9% sodium chloride 250 mL (VIAL-MATE)  500 mg IntraVENous Q24H    [Held by provider] metoprolol tartrate (LOPRESSOR) tablet 25 mg  25 mg Oral BID    insulin lispro (HUMALOG) injection   SubCUTAneous AC&HS    glucose chewable tablet 16 g  4 Tablet Oral PRN    glucagon (GLUCAGEN) injection 1 mg  1 mg IntraMUSCular PRN    dextrose 10% infusion 0-250 mL  0-250 mL IntraVENous PRN    [Held by provider] insulin glargine (LANTUS) injection 5 Units  5 Units SubCUTAneous QHS    [Held by provider] guaiFENesin ER (MUCINEX) tablet 600 mg  600 mg Oral Q12H       Review of Symptoms: comprehensive ROS negative except above.    Objective:   Patient Vitals for the past 24 hrs:   Temp Pulse Resp BP SpO2   01/18/23 0820 98.3 °F (36.8 °C) 97 18 (!) 156/79 92 %   01/18/23 0322 99.6 °F (37.6 °C) 76 17 129/80 95 %   01/18/23 0125 -- -- -- -- 98 %   01/18/23 0028 98.9 °F (37.2 °C) 77 17 130/80 93 %   01/17/23 2009 99.1 °F (37.3 °C) 87 16 (!) 149/84 96 %   01/17/23 1637 97.2 °F (36.2 °C) 90 15 (!) 167/94 99 %   01/17/23 1425 -- -- -- (!) 167/94 --   01/17/23 1355 97.2 °F (36.2 °C) 90 -- (!) 181/100 99 %   01/17/23 1250 97.2 °F (36.2 °C) 83 18 (!) 147/72 --   01/17/23 1243 -- 84 18 (!) 164/78 --   01/17/23 1230 -- 84 18 (!) 174/74 --   01/17/23 1215 -- 89 18 (!) 166/87 --          Weight change:      01/16 1901 - 01/18 0700  In: -   Out: 2900     Intake/Output Summary (Last 24 hours) at 1/18/2023 1214  Last data filed at 1/17/2023 1243  Gross per 24 hour   Intake --   Output 2900 ml   Net -2900 ml       Physical Exam:   General: comfortable, no acute distress   HEENT sclera anicteric, supple neck, no thyromegaly  CVS: S1S2 heard,  no rub  RS: + air entry b/l  Abd: Soft, tenderness in LLQ  Neuro: non focal, awake, alert , CN II-XII are grossly intact  Extrm: no edema, no cyanosis, clubbing   Skin: no visible  Rash  Musculoskeletal: No gross joints or bone deformities   Access: maturing right arm access      Data Review:     LABS:   Hematology:   Recent Labs     01/17/23  1313 01/16/23  0052   WBC 11.5 10.4   HGB 12.3 9.6*   HCT 39.7 31.6*       Chemistry:   Recent Labs     01/18/23  0341 01/17/23  1313 01/17/23  0238 01/16/23  0052   BUN 49*  --  90* 80*   CREA 1.89*  --  2.49* 3.03*   CA 9.1  --  8.1* 8.8   ALB 3.0* 3.6 3.2* 3.3*   K 4.8  --  4.8 4.7     --  134* 135*     --  106 101   CO2 27  --  22 23   PHOS 3.9  --  4.9 5.0*   *  --  129* 205*              Procedures/imaging: see electronic medical records for all procedures, Xrays and details which were not copied into this note but were reviewed prior to creation of Plan          Assessment & Plan:       See above      Del Abel MD  1/18/2023

## 2023-01-18 NOTE — PROGRESS NOTES
[facility]  [facility]    Patient: Charlie Castillo Age: 81 y.o. : 1950 MR#: [id number] SSN: [id number]  Date/Time: 2023    Subjective:   I saw patient earlier today in the morning with RN present at bedside  Patient is sitting in bed, complains of abdominal pain all over    Assessment/Plan:    81year old female w/ h/o ESRD, COPD not on home 02, HFpEF admitted to the ED after presenting with c/o severe SOB and wheezing.    -Abdominal pain, pneumoperitoneum, diverticulitis on CT scan    -COPD w/ acute exacerbation: on IV steroid, bronchodilators, on supplemental 02. Has ambulatory hypoxia. HISTORY OF:  -HTN  -CAD  -HFpEF: last echo in 2022-->EF: 50-55%  -Type 2DM: detemir 15 units/day as outpt, here on lantus 5 units, blood sugars within acceptable limits for the most part. -CAD    PLAN:  CT abdomen showed pneumoperitoneum and diverticulitis. Discussed with radiology. I urgently discussed with the general surgeon who assessed the patient. He is planning to take the patient to the OR today. I have started Zosyn. I have discussed with nephrology and have added some IV fluids as patient is n.p.o.  I discussed with patient and RN.   Patient connected me with her  over her cell phone and I updated him.  -Cont bronchodilators, 02 support  -agree with oral steroid   -Cont HD  -Cont azithromycin, day 4 today  -Cont pt/ot    Dispo: Based on PT OT  Additional Notes:      Case discussed with:  [x]Patient  []Family  []Nursing  []Case Management  DVT Prophylaxis:  []Lovenox  [x]Hep SQ  []SCDs  []Coumadin   []On Heparin gtt    Objective:   VS: Visit Vitals  BP (!) 156/79 (BP 1 Location: Left upper arm, BP Patient Position: At rest;Sitting)   Pulse 97   Temp 98.3 °F (36.8 °C)   Resp 18   Ht 5' 4\" (1.626 m)   Wt 96.6 kg (213 lb)   SpO2 92%   Breastfeeding Unknown   BMI 36.56 kg/m²      Tmax/24hrs: Temp (24hrs), Av.2 °F (36.8 °C), Min:97.2 °F (36.2 °C), Max:99.6 °F (37.6 °C)    Input/Output:   Intake/Output Summary (Last 24 hours) at 1/18/2023 1147  Last data filed at 1/17/2023 1243  Gross per 24 hour   Intake --   Output 2900 ml   Net -2900 ml         General:  Awake, alert  Cardiovascular:  S1S2+, RRR  Pulmonary: Coarse breath sounds bilaterally  GI:  Soft, BS+, diffuse tenderness to palpation. Patient has some rebound and some guarding  Extremities:  No edema      Labs:    Recent Results (from the past 24 hour(s))   CBC WITH AUTOMATED DIFF    Collection Time: 01/17/23  1:13 PM   Result Value Ref Range    WBC 11.5 4.6 - 13.2 K/uL    RBC 4.41 4.20 - 5.30 M/uL    HGB 12.3 12.0 - 16.0 g/dL    HCT 39.7 35.0 - 45.0 %    MCV 90.0 78.0 - 100.0 FL    MCH 27.9 24.0 - 34.0 PG    MCHC 31.0 31.0 - 37.0 g/dL    RDW 18.6 (H) 11.6 - 14.5 %    PLATELET 212 459 - 050 K/uL    MPV 9.7 9.2 - 11.8 FL    NRBC 3.6 (H) 0  WBC    ABSOLUTE NRBC 0.41 (H) 0.00 - 0.01 K/uL    NEUTROPHILS 86 (H) 40 - 73 %    LYMPHOCYTES 6 (L) 21 - 52 %    MONOCYTES 7 3 - 10 %    EOSINOPHILS 0 0 - 5 %    BASOPHILS 0 0 - 2 %    IMMATURE GRANULOCYTES 1 (H) 0.0 - 0.5 %    ABS. NEUTROPHILS 9.8 (H) 1.8 - 8.0 K/UL    ABS. LYMPHOCYTES 0.7 (L) 0.9 - 3.6 K/UL    ABS. MONOCYTES 0.8 0.05 - 1.2 K/UL    ABS. EOSINOPHILS 0.0 0.0 - 0.4 K/UL    ABS. BASOPHILS 0.0 0.0 - 0.1 K/UL    ABS. IMM. GRANS. 0.1 (H) 0.00 - 0.04 K/UL    DF AUTOMATED     HEPATIC FUNCTION PANEL    Collection Time: 01/17/23  1:13 PM   Result Value Ref Range    Protein, total 7.2 6.4 - 8.2 g/dL    Albumin 3.6 3.4 - 5.0 g/dL    Globulin 3.6 2.0 - 4.0 g/dL    A-G Ratio 1.0 0.8 - 1.7      Bilirubin, total 0.7 0.2 - 1.0 MG/DL    Bilirubin, direct 0.2 0.0 - 0.2 MG/DL    Alk.  phosphatase 141 (H) 45 - 117 U/L    AST (SGOT) 29 10 - 38 U/L    ALT (SGPT) 87 (H) 13 - 56 U/L   LIPASE    Collection Time: 01/17/23  1:13 PM   Result Value Ref Range    Lipase 426 (H) 73 - 393 U/L   GLUCOSE, POC    Collection Time: 01/17/23  3:46 PM   Result Value Ref Range Glucose (POC) 285 (H) 70 - 110 mg/dL   GLUCOSE, POC    Collection Time: 01/17/23  9:26 PM   Result Value Ref Range    Glucose (POC) 264 (H) 70 - 110 mg/dL   RENAL FUNCTION PANEL    Collection Time: 01/18/23  3:41 AM   Result Value Ref Range    Sodium 138 136 - 145 mmol/L    Potassium 4.8 3.5 - 5.5 mmol/L    Chloride 106 100 - 111 mmol/L    CO2 27 21 - 32 mmol/L    Anion gap 5 3.0 - 18 mmol/L    Glucose 103 (H) 74 - 99 mg/dL    BUN 49 (H) 7.0 - 18 MG/DL    Creatinine 1.89 (H) 0.6 - 1.3 MG/DL    BUN/Creatinine ratio 26 (H) 12 - 20      eGFR 28 (L) >60 ml/min/1.73m2    Calcium 9.1 8.5 - 10.1 MG/DL    Phosphorus 3.9 2.5 - 4.9 MG/DL    Albumin 3.0 (L) 3.4 - 5.0 g/dL   GLUCOSE, POC    Collection Time: 01/18/23  6:53 AM   Result Value Ref Range    Glucose (POC) 109 70 - 110 mg/dL     Additional Data Reviewed:      Dragon medical dictation software was used for portions of this report. Unintended errors may occur.       Signed By: Tho Desai MD     January 18, 2023

## 2023-01-18 NOTE — PROGRESS NOTES
[facility]  Pulmonary, Critical Care, and Sleep Medicine    Name: Gloria Francois MRN: [id number]   : 1950 Hospital: [address] [place] [address]    Date: 2023        Pulmonary -Critical Care: progress note    Admission Date:   2023  LOS: 5  MAR reviewed and pertinent medications noted or modified as needed    IMPRESSION:   Acute hypoxic respiratory failure - Improving  Wheezing secondary to COPD, component of cardiac asthma with chronic systolic and diastolic heart failure. Wheezing  - COPD-previous imaging with small cystic changes noted on CT scan but PFTs with predominant restrictive lung disease. She does have a history of smoking and secondhand smoke exposure and may have component of  \"cardiac asthma\" in the setting of chronic systolic/diastolic heart failure, favor the latter Echo from 2015 with EF 45% and mild to moderate mitral regurgitation. CXR per my review notes some vascular congestion at bases RIGHT > LEFT  Dysphonia: Persistent Upper airway sounds: Possible soft tissue and/or Vocal Cord Dysfunction  Abdominal pain-new symptoms with CT scan showing possible perforation-scheduled for OR today  Hypertension: CLV[facility] on Echo  CHF: HFpEF: Diastolic dysfunction noted on echo, history of CAD with STEMI in 2018  TESSA  Was previously followed by Dr. Encarnacion Agent to follow up and has not been seen for an extended period of time. DM 2  ESRD- HD dependent  Former smoker 46 PY, quit in 2019, + 2nd hand smoke exposure from   Chronic pain syndrome  Morbid Obesity Body mass index is 41.81 kg/m²  H/O COVID Infection . H/O Abnormal CT Chest Imagin2022- diffuse asymmetric GGOs and atelectasis- most likely from pulmonary edema- Resolved on CT Chest 22         RECOMMENDATIONS:   Patient needs emergent exploratory laparotomy and should proceed irrespective of cardiopulmonary status which is currently controlled and at baseline-add stress dose steroids.   Orders written. In addition patient may have difficult airway secondary to redundant soft tissue in the neck  PULM:  Continue with Brovana/ Pulmicort Q12 hrs-can resume Anoro at discharge  Change Duoneb to PRN  Would add stress dose corticosteroids for OR and then transition back to maintenance  Continue PRN albuterol   If upper airway sounds over VC/neck region persist.  Patient also ex-smoker- consider ENT evaluation of VC with NP scope. Patient's upper airway sounds may be due to redundant soft tissue in neck vs vocal cord dysfunction  Maintain aspiration precautions: HOB >30 degrees  SpO2 goal >92%  Trial of QHS BIPAP  Bronchial /oral hygiene; IS at bedside please  Monitor I&Os  Continue antibiotics-extend to abdominal coverage  HD per nephrology  Electrolyte management, prophylaxis and glycemic control per primary and respective consultants  OP Pulmonary- Sleep follow up  Will continue to follow inpatient perioperatively until discharge given complexity of condition      CODE STATUS: Full Code        Subjective/History: This patient has been seen and evaluated at the request of Dr. Shabana Farris for COPD exacerbation. HPI  Patient is a 81 y.o. female  PMHx of ESRD on HD, COPD not on home O2, CAD, DM, HTN, HFpEF. The patient has been seen by our group as an in-patient. She has not followed up with any Pulmonologist as an OP. The patient states she did not realized she needed to follow up with Pulmonary but that she will in the future. The patient reports increasing shortness of breath and wheezing over the past week- more significant after her HD session. She reports that she had to cut her run 15 minutes early so she could get her medial ride. When she got home she became more short of breath with wheeling. She also noted nausea without emesis. No known sick contacts. No fever or chills. No recurrent nausea.   No emesis,  No chest pain  She continues to refrain from smoking, but her  does smoke with in and out side the home. Inhalers: Anoro and PRN albuterol nebs  Respiratory Triggers: smoke and cold air  Pets:  2 has 4: small dogs: santiago -samantha mixlane- they do not sleep with patient and have their own room  Home: No known water leaks, no bug infestations  Obstructive Sleep Apnea (TESSA): The patient has history of TESSA. She was previously followed by Dr. Guru Magallon. She reports that she had a recalled Jaime PAP device. Her DME was [facility]. She reports that she did register her device with [employer], but never got a replacement. The patient stopped using her device and actually brought her device back to [facility]. She has not used PAP therapy for an extended period of time. She is HD dependant. She denies any diet indiscretions. The patient had ID earlier today in our HD unit. She has a THDC in her right upper chest wall. She had a AVF created a few weeks ago and is not ready for use    Interval Evaluation:   01/18/23  LOS: 5    Patient is laying on left side down complaining of severe abdominal pain.   Upon touching she had significant rebound tenderness  She states her breathing is up and down no worse no better  No chest pain  No hemoptysis  She had a CT scan of abdomen pelvis completed this morning with findings concerning for perforation-pneumoperitoneum        Patient Vitals for the past 24 hrs:   Temp Pulse Resp BP SpO2   01/18/23 0820 98.3 °F (36.8 °C) 97 18 (!) 156/79 92 %   01/18/23 0322 99.6 °F (37.6 °C) 76 17 129/80 95 %   01/18/23 0125 -- -- -- -- 98 %   01/18/23 0028 98.9 °F (37.2 °C) 77 17 130/80 93 %   01/17/23 2009 99.1 °F (37.3 °C) 87 16 (!) 149/84 96 %   01/17/23 1637 97.2 °F (36.2 °C) 90 15 (!) 167/94 99 %   01/17/23 1425 -- -- -- (!) 167/94 --   01/17/23 1355 97.2 °F (36.2 °C) 90 -- (!) 181/100 99 %   01/17/23 1250 97.2 °F (36.2 °C) 83 18 (!) 147/72 --         Past Medical History:   Diagnosis Date    Abnormal WBC count 05/17/2016    Anemia     Bilateral shoulder pain 09/27/2016    Chondromalacia of both patellae     Chronic heart failure with preserved ejection fraction ([facility] 1/13/2023    COPD (chronic obstructive pulmonary disease) ([facility] 09/2015    mild-mod dz; Dr Cat Galicia    Diabetes [facility]) 2013    Diabetic eye exam ([facility] 2016    Dilated cardiomyopathy ([facility]     Dyslipidemia     Gout     Heart attack ([facility] 10/18/2019    Stented    History of echocardiogram 11/14/2014    Mild LVE. EF 40%. Mild, diffuse hypk. Mild LAE. Mild MR.       Hypercholesteremia 01/08/2014    Hypertension 2000    Noncompliance with medications 02/16/2016    Obesity     Orthostatic hypotension 05/17/2016    Osteoarthritis of both knees     Pain management 04/01/2016    Dr. Yadi Donohue     Popliteal cyst, bilateral      Sleep apnea     not using cpap    Stage 3b chronic kidney disease ([facility] 08/08/2022    Vitamin D deficiency 10/16/2014      Past Surgical History:   Procedure Laterality Date    HX HEART CATHETERIZATION  2019    Coronary stent    HX TUBAL LIGATION      IR INSERT TUNL CVC W/O PORT OVER 5 YR  09/21/2022        Current Facility-Administered Medications   Medication Dose Route Frequency    piperacillin-tazobactam (ZOSYN) 4.5 g in 0.9% sodium chloride (MBP/ADV) 100 mL MBP  4.5 g IntraVENous NOW    methylPREDNISolone (PF) (SOLU-MEDROL) injection 40 mg  40 mg IntraVENous Q12H    pantoprazole (PROTONIX) injection 40 mg  40 mg IntraVENous Q12H    0.9% sodium chloride infusion  50 mL/hr IntraVENous CONTINUOUS    piperacillin-tazobactam (ZOSYN) 3.375 g in 0.9% sodium chloride (MBP/ADV) 100 mL MBP  3.375 g IntraVENous Q12H    ipratropium (ATROVENT) 0.02 % nebulizer solution 0.5 mg  0.5 mg Nebulization Q6H RT    [Held by provider] melatonin tablet 5 mg  5 mg Oral QHS    [Held by provider] heparin (porcine) injection 5,000 Units  5,000 Units SubCUTAneous Q8H    epoetin tonny-epbx (RETACRIT) injection 4,000 Units  4,000 Units SubCUTAneous Q TUE, THU & SAT    [Held by provider] aspirin chewable tablet 162 mg  162 mg Oral DAILY [Held by provider] atorvastatin (LIPITOR) tablet 80 mg  80 mg Oral QHS    [Held by provider] ezetimibe (ZETIA) tablet 10 mg  10 mg Oral DAILY    [Held by provider] gabapentin (NEURONTIN) capsule 400 mg  400 mg Oral DAILY    sodium chloride (NS) flush 5-40 mL  5-40 mL IntraVENous Q8H    arformoteroL (BROVANA) neb solution 15 mcg  15 mcg Nebulization BID RT    budesonide (PULMICORT) 500 mcg/2 ml nebulizer suspension  500 mcg Nebulization BID RT    azithromycin (ZITHROMAX) 500 mg in 0.9% sodium chloride 250 mL (VIAL-MATE)  500 mg IntraVENous Q24H    [Held by provider] metoprolol tartrate (LOPRESSOR) tablet 25 mg  25 mg Oral BID    insulin lispro (HUMALOG) injection   SubCUTAneous AC&HS    [Held by provider] insulin glargine (LANTUS) injection 5 Units  5 Units SubCUTAneous QHS    [Held by provider] guaiFENesin ER (MUCINEX) tablet 600 mg  600 mg Oral Q12H     No Known Allergies     Review of Systems:  A comprehensive review of systems was negative except for that written in the HPI. Objective:   Vital Signs:    Visit Vitals  BP (!) 156/79 (BP 1 Location: Left upper arm, BP Patient Position: At rest;Sitting)   Pulse 97   Temp 98.3 °F (36.8 °C)   Resp 18   Ht 5' 4\" (1.626 m)   Wt 97.2 kg (214 lb 4.8 oz)   SpO2 92%   Breastfeeding Unknown   BMI 36.78 kg/m²       O2 Device: None (Room air)   O2 Flow Rate (L/min): 2 l/min   Temp (24hrs), Av.2 °F (36.8 °C), Min:97.2 °F (36.2 °C), Max:99.6 °F (37.6 °C)       Intake/Output:   Last shift:      No intake/output data recorded. Last 3 shifts:  1901 -  0700  In: -   Out: 2900   No intake or output data in the 24 hours ending 23 1249      Physical Exam:    General:  Alert, cooperative, no distress, appears stated age + Obese body habitus   Head:  Normocephalic, without obvious abnormality, atraumatic. Eyes:  Conjunctivae/corneas clear. PERRL, EOMs intact. Nose: Nares normal. Septum midline. Mucosa normal. No drainage or sinus tenderness.    Throat: Lips, mucosa, and tongue normal. No teeth   Neck: Supple, symmetrical, trachea midline, no adenopathy, thyroid: no enlargment/tenderness/nodules, no carotid bruit and no JVD. Back:   Symmetric,   Lungs:   Bilateral breath sounds present. Clear to auscultation   Chest wall:  THDC right upper chest wall. No tenderness or deformity.- No crepitus   Heart:  Regular rate and rhythm, S1, S2 normal, no murmur, click, rub or gallop. Abdomen:   Soft, rebound tenderness left lower quadrant. Extremities: Extremities normal, atraumatic, no cyanosis or edema. - unmatured AFV right arm- + palpable thrill   Pulses: 2+ and symmetric all extremities. Skin: Skin color, texture, turgor normal. No rashes or lesions   Lymph nodes:      Cervical, supraclavicular nodes: normal on palpation   Neurologic: Grossly nonfocal       Data:     Recent Labs     01/18/23  1211 01/17/23  1313 01/16/23  0052   WBC 12.9 11.5 10.4   HGB 11.2* 12.3 9.6*   HCT 36.6 39.7 31.6*    345 319        Latest Reference Range & Units 12/9/22 12:48 12/10/22 04:27 12/11/22 02:46 12/12/22 04:35 12/13/22 01:46 1/13/23 09:55   EOSINOPHILS 0 - 5 % 3 3 2 2 2 1   ABS.  EOSINOPHILS 0.0 - 0.4 K/UL 0.1 0.1 0.1 0.1 0.1 0.0         Recent Labs     01/18/23  1211 01/18/23  0341 01/17/23  1313 01/17/23  0238 01/16/23  0052   * 138  --  134* 135*   K 5.3 4.8  --  4.8 4.7    106  --  106 101   CO2 25 27 --  22 23   * 103*  --  129* 205*   BUN 53* 49*  --  90* 80*   CREA 1.91* 1.89*  --  2.49* 3.03*   CA 9.4 9.1  --  8.1* 8.8   MG 2.4  --   --   --   --    PHOS  --  3.9  --  4.9 5.0*   ALB 3.4 3.0* 3.6 3.2* 3.3*   ALT 55  --  87*  --   --    INR 1.1  --   --   --   --        Lab Results   Component Value Date/Time    NT pro-BNP 1,057 (H) 10/08/2022 05:15 PM    NT pro-BNP 2,013 (H) 09/09/2022 07:34 AM    NT pro-BNP 1,871 (H) 08/08/2022 01:48 AM    NT pro-BNP 3,952 (H) 07/08/2022 09:53 AM    NT pro-BNP 1,020 (H) 03/18/2021 01:29 AM     No results for input(s): PH, PCO2, PO2, HCO3, FIO2 in the last 72 hours. No results for input(s): FIO2I, IFO2, HCO3I, IHCO3, HCOPOC, PCO2I, PCOPOC, IPHI, PHI, PHPOC, PO2I, PO2POC in the last 72 hours.     No lab exists for component: IPOC2      MICRO:  Results       Procedure Component Value Units Date/Time    RESPIRATORY VIRUS PANEL W/COVID-19, PCR [[id number]] Collected: 01/13/23 1737    Order Status: Completed Specimen: Nasopharyngeal Updated: 01/13/23 1858     Adenovirus Not detected        Coronavirus 229E Not detected        Coronavirus HKU1 Not detected        Coronavirus CVNL63 Not detected        Coronavirus OC43 Not detected        SARS-CoV-2, PCR Not detected        Metapneumovirus Not detected        Rhinovirus and Enterovirus Not detected        Influenza A Not detected        Influenza A, subtype H1 Not detected        Influenza A, subtype H3 Not detected        INFLUENZA A H1N1 PCR Not detected        Influenza B Not detected        Parainfluenza 1 Not detected        Parainfluenza 2 Not detected        Parainfluenza 3 Not detected        Parainfluenza virus 4 Not detected        RSV by PCR Not detected        B. parapertussis, PCR Not detected        Bordetella pertussis - PCR Not detected        Chlamydophila pneumoniae DNA, QL, PCR Not detected        Mycoplasma pneumoniae DNA, QL, PCR Not detected                 CARDIO:    EKG Results       Procedure 720 Value Units Date/Time    EKG, 12 LEAD, INITIAL [[id number]] Collected: 01/13/23 0942    Order Status: Completed Updated: 01/13/23 1856     Ventricular Rate 98 BPM      Atrial Rate 98 BPM      P-R Interval 160 ms      QRS Duration 78 ms      Q-T Interval 360 ms      QTC Calculation (Bezet) 459 ms      Calculated P Axis 74 degrees      Calculated R Axis 20 degrees      Calculated T Axis 86 degrees      Diagnosis --     Normal sinus rhythm  Left atrial enlargement  Minimal voltage criteria for LVH, may be normal variant ( Craig product )  Borderline ECG  When compared with ECG of 08-DEC-2022 12:19,  No significant change was found  Confirmed by Zachariah Smart ([id number]) on 1/13/2023 6:56:19 PM              06/25/22    ECHO ADULT COMPLETE 06/29/2022 6/29/2022    Interpretation Summary    Left Ventricle: Mildly reduced left ventricular systolic function with a visually estimated EF of 50 - 55%. Left ventricle is mildly dilated. Increased wall thickness. Findings consistent with mild concentric hypertrophy. See diagram for wall motion findings. Diastolic dysfunction present with normal LV EF. Mitral Valve: Mildly thickened leaflet. Mild regurgitation with a centrally directed jet. Signed by: Lisy Valentin MD on 6/29/2022 12:12 PM    Imaging:  I have personally reviewed the patients radiographs and have reviewed the reports:                  Chest CT:  9/20/22  IMPRESSION     1. Interval resolution of extensive bilateral lung airspace disease. 2.  Interval resolved small bilateral pleural effusions and mild cardiomegaly. 3.  Interval resolved lymphadenopathy. 4.  Chronic interstitial reticular and cystic changes of the lungs appear  stable. Thank you for your referral.            Complex decision making was made in the evaluation and management plans during this consultation. More than 50% of time was spent in counseling and coordination of care including review of data and discussion with other team members.       Darlene Santiago MD    [facility]  Pulmonary, Critical Care, and Sleep Medicine

## 2023-01-18 NOTE — WOUND CARE
Physical Exam  Room 7 preop: stoma marked LLQ abdomen for possible colostomy.     Joi LOERAN, RN, [facility], [address]

## 2023-01-18 NOTE — ANESTHESIA PREPROCEDURE EVALUATION
Relevant Problems   RESPIRATORY SYSTEM   (+) COPD with acute exacerbation (HCC)   (+) Chronic obstructive pulmonary disease (HCC)   (+) TESSA on CPAP   (+) Pneumonia due to COVID-19 virus   (+) SOB (shortness of breath)      NEUROLOGY   (+) CVA (cerebral vascular accident) ([facility]   (+) TIA (transient ischemic attack)      CARDIOVASCULAR   (+) Acute exacerbation of CHF (congestive heart failure) ([facility])   (+) CAD (coronary artery disease)   (+) CHF (congestive heart failure) (HCC)   (+) Chronic systolic congestive heart failure (HCC)   (+) Coronary artery disease involving native coronary artery of native heart without angina pectoris   (+) Coronary artery disease involving native coronary artery with unstable angina pectoris ([facility])   (+) Essential hypertension   (+) Nonrheumatic mitral valve regurgitation   (+) STEMI (ST elevation myocardial infarction) [facility]      RENAL FAILURE   (+) Anemia due to chronic kidney disease, on chronic dialysis (HCC)   (+) ESRD (end stage renal disease) on dialysis ([facility])   (+) Stage 1 acute kidney injury ([facility]   (+) Stage 3b chronic kidney disease (HCC)      ENDOCRINE   (+) Severe obesity (BMI 35.0-39. 9) with comorbidity (HCC)   (+) Type 2 diabetes mellitus with hyperglycemia (HCC)   (+) Type 2 diabetes mellitus with hyperglycemia, with long-term current use of insulin (HCC)      HEMATOLOGY   (+) Anemia due to chronic kidney disease, on chronic dialysis (HCC)   (+) Iron deficiency anemia      PERSONAL HX & FAMILY HX OF CANCER   (+) Breast cancer (HCC)   (+) Rectal cancer (HCC)       Anesthetic History   No history of anesthetic complications            Review of Systems / Medical History  Patient summary reviewed, nursing notes reviewed and pertinent labs reviewed    Pulmonary    COPD: severe    Sleep apnea: No treatment           Neuro/Psych       CVA: no residual symptoms  TIA     Cardiovascular    Hypertension: well controlled          CAD         GI/Hepatic/Renal  Within defined limits Endo/Other    Diabetes: well controlled, type 2    Morbid obesity and arthritis     Other Findings   Comments: Admitted for copd exacerbation, abd ct found perforated diverticula, going for ex-lap/colectomy/colostomy         Physical Exam    Airway  Mallampati: III  TM Distance: 4 - 6 cm  Neck ROM: normal range of motion   Mouth opening: Normal     Cardiovascular  Regular rate and rhythm,  S1 and S2 normal,  no murmur, click, rub, or gallop  Rhythm: regular  Rate: normal         Dental    Dentition: Edentulous     Pulmonary  Breath sounds clear to auscultation               Abdominal  GI exam deferred       Other Findings            Anesthetic Plan    ASA: 4  Anesthesia type: general          Induction: RSI and Intravenous  Anesthetic plan and risks discussed with: Patient

## 2023-01-18 NOTE — PROGRESS NOTES
Called CT to make them aware of IV placement for CT. They stated that they were unable to do the scan because the patient had to be going to dialysis within the next 24 hours. Read them the note from nephrologist saying that it was okay to be completed and they again stated that it has to be within 24 hours. They stated that they could do the scan on day shift 1/18 because the patient is supposed to go to dialysis on dayshift 1/19 and that would met the 24 hour criteria.

## 2023-01-18 NOTE — PROGRESS NOTES
RENAL DOSE ADJUSTMENT MADE PER P/T PROTOCOL     PREVIOUS ORDER:  Zosyn 2.25 g IVPB x 1 now     Estimated Creatinine Clearance: 31.3 mL/min (A) (based on SCr of 1.89 mg/dL (H)).     Recent Labs     01/18/23  0341 01/17/23  1313 01/17/23  0238 01/16/23  0052   BUN 49*  --  90* 80*   PLT  --  345  --  319        NEW RENALLY ADJUSTED ORDER: Zosyn 4.5 g IVPB x 1      Per [facility] B-lactam extended infusion dosing policy - all initial doses are 4.5 g over 30 min    [facility]  1/18/2023 11:22 AM

## 2023-01-18 NOTE — CONSULTS
General Surgery Consult    Martin Silverman  Admit date: 2023    MRN: [id number]     : 1950     Age: 81 y.o. Attending Physician: Thomas Rodrigues MD [facility]      History of Present Illness:      Martin Silverman is a 81 y.o. female who was consulted by the medicine team for evaluation of pneumoperitoneum in the setting of severe abdominal pain. The patient has multiple significant medical conditions including end-stage renal disease on dialysis and severe COPD as well as other significant medical conditions who was being treated for her exacerbation of her COPD.  2 days ago the patient started having some abdominal pain that did get worse yesterday and today it got so severe that they ordered a CT scan of abdomen and pelvis. The CT scan showed pneumoperitoneum with multiple foci of free air at the left lower quadrant. There was also evidence of diverticulitis of the descending colon and sigmoid colon. When I was told about the findings I came upstairs and examined the patient. She stated that she is in severe pain and she is not able to move. She is on facemask. Her vitals shows no fever or tachycardia and her WBC is normal.  She has a midline laparotomy scar and she stated this was some type of uterine surgery though on the CT scan she still has her uterus.     Patient Active Problem List    Diagnosis Date Noted    Sinus tachycardia 2023    Chronic heart failure with preserved ejection fraction ([address] 2023    Anemia due to chronic kidney disease, on chronic dialysis ([address] 2023    TIA (transient ischemic attack) 2022    Dizziness 2022    Stenosis of both vertebral arteries 2022    CVA (cerebral vascular accident) ([address] 2022    ESRD (end stage renal disease) on dialysis ([address] 2022    CHF (congestive heart failure) ([address] 2022    Pneumonia due to COVID-19 virus 2022    Acute on chronic diastolic heart failure ([address] 2022    Stage 3b chronic kidney disease ([address] 08/08/2022    Pulmonary hypertension ([address] 07/08/2022    Acute exacerbation of CHF (congestive heart failure) ([address] 07/08/2022    History of myocardial infarction 06/27/2022    Acute hyperkalemia 06/26/2022    Breast cancer ([address] 06/26/2022    Rectal cancer ([address] 06/26/2022    Hyperkalemia 03/07/2022    Stage 1 acute kidney injury ([address] 12/18/2021    Hypotension 03/19/2021    Sepsis ([address] 03/18/2021    Nonrheumatic mitral valve regurgitation 08/13/2020    Chronic systolic congestive heart failure ([address] 11/07/2019    Pulmonary edema cardiac cause ([address] 10/19/2019    COPD with acute exacerbation ([address] 10/19/2019    Respiratory failure requiring intubation ([address] 10/19/2019    Syncope 10/18/2019    Flash pulmonary edema ([address] 10/18/2019    Elevated d-dimer 10/18/2019    Acute respiratory failure with hypoxia ([address] 10/18/2019    CAD (coronary artery disease) 10/18/2019    Ischemic cardiomyopathy 09/09/2019    Type 2 diabetes mellitus with hyperglycemia ([address] 08/26/2019    TESSA on CPAP 08/26/2019    Type 2 diabetes mellitus with hyperglycemia, with long-term current use of insulin ([address] 07/12/2018    Severe obesity (BMI 35.0-39. 9) with comorbidity ([address] 07/12/2018    SOB (shortness of breath) 06/09/2018    Coronary artery disease involving native coronary artery with unstable angina pectoris ([address] 06/09/2018    Pulmonary edema 06/08/2018    Coronary artery disease involving native coronary artery of native heart without angina pectoris 05/31/2018    STEMI (ST elevation myocardial infarction) ([address] 05/20/2018    Acute pulmonary edema ([address] 05/20/2018    Caregiver stress 04/26/2017    Bilateral shoulder pain 09/27/2016    Elevated alkaline phosphatase level 09/27/2016    Chronic obstructive pulmonary disease ([facility] 05/17/2016    Compliance with medication regimen 05/17/2016    Chronic pain of both knees 04/13/2016    Chronic pain syndrome 04/13/2016    Primary osteoarthritis of both knees 04/13/2016    Iron deficiency anemia 10/22/2015    Decreased GFR 10/22/2015    Dyslipidemia, goal LDL below 70 10/22/2015    Former smoker 10/22/2015    Osteoarthritis of both knees     Essential hypertension 09/17/2015    Environmental allergies 04/01/2015    Cardiomyopathy, dilated ([facility] 12/31/2014     Past Medical History:   Diagnosis Date    Abnormal WBC count 05/17/2016    Anemia     Bilateral shoulder pain 09/27/2016    Chondromalacia of both patellae     Chronic heart failure with preserved ejection fraction ([facility] 1/13/2023    COPD (chronic obstructive pulmonary disease) ([facility] 09/2015    mild-mod dz; Dr Sander Castro    Diabetes [facility]) 2013    Diabetic eye exam ([facility] 2016    Dilated cardiomyopathy ([facility]     Dyslipidemia     Gout     Heart attack ([facility] 10/18/2019    Stented    History of echocardiogram 11/14/2014    Mild LVE. EF 40%. Mild, diffuse hypk. Mild LAE. Mild MR.       Hypercholesteremia 01/08/2014    Hypertension 2000    Noncompliance with medications 02/16/2016    Obesity     Orthostatic hypotension 05/17/2016    Osteoarthritis of both knees     Pain management 04/01/2016    Dr. Finn Huge     Popliteal cyst, bilateral      Sleep apnea     not using cpap    Stage 3b chronic kidney disease ([facility] 08/08/2022    Vitamin D deficiency 10/16/2014      Past Surgical History:   Procedure Laterality Date    HX HEART CATHETERIZATION  2019    Coronary stent    HX TUBAL LIGATION      IR INSERT TUNL CVC W/O PORT OVER 5 YR  09/21/2022      Social History     Tobacco Use    Smoking status: Former     Packs/day: 1.00     Years: 52.00     Pack years: 52.00     Types: Cigarettes     Quit date: 5/20/2019     Years since quitting: 3.6    Smokeless tobacco: Never   Substance Use Topics    Alcohol use: No     Alcohol/week: 0.0 standard drinks      Social History     Tobacco Use   Smoking Status Former    Packs/day: 1.00    Years: 52.00    Pack years: 52.00    Types: Cigarettes    Quit date: 5/20/2019    Years since quitting: 3.6   Smokeless Tobacco Never     Family History   Problem Relation Age of Onset    Diabetes Mother     Hypertension Mother     Hypertension Father     Kidney Disease Maternal Aunt 48        Dialysis      Current Facility-Administered Medications   Medication Dose Route Frequency    pantoprazole (PROTONIX) injection 40 mg  40 mg IntraVENous Q12H    0.9% sodium chloride infusion  50 mL/hr IntraVENous CONTINUOUS    piperacillin-tazobactam (ZOSYN) 3.375 g in 0.9% sodium chloride (MBP/ADV) 100 mL MBP  3.375 g IntraVENous Q12H    hydrocortisone Sod Succ (PF) (SOLU-CORTEF) injection 100 mg  100 mg IntraVENous Q6H    glucose chewable tablet 16 g  4 Tablet Oral PRN    glucagon (GLUCAGEN) injection 1 mg  1 mg IntraMUSCular PRN    dextrose 10% infusion 0-250 mL  0-250 mL IntraVENous PRN    0.9% sodium chloride infusion  25 mL/hr IntraVENous CONTINUOUS    sodium chloride (OCEAN) 0.65 % nasal squeeze bottle 2 Spray  2 Spray Both Nostrils Q2H PRN    ipratropium (ATROVENT) 0.02 % nebulizer solution 0.5 mg  0.5 mg Nebulization Q6H RT    traMADoL (ULTRAM) tablet 50 mg  50 mg Oral Q6H PRN    [Held by provider] melatonin tablet 5 mg  5 mg Oral QHS    [Held by provider] heparin (porcine) injection 5,000 Units  5,000 Units SubCUTAneous Q8H    epoetin tonny-epbx (RETACRIT) injection 4,000 Units  4,000 Units SubCUTAneous Q TUE, THU & SAT    [Held by provider] aspirin chewable tablet 162 mg  162 mg Oral DAILY    [Held by provider] atorvastatin (LIPITOR) tablet 80 mg  80 mg Oral QHS    [Held by provider] ezetimibe (ZETIA) tablet 10 mg  10 mg Oral DAILY    [Held by provider] gabapentin (NEURONTIN) capsule 400 mg  400 mg Oral DAILY    sodium chloride (NS) flush 5-40 mL  5-40 mL IntraVENous Q8H    sodium chloride (NS) flush 5-40 mL  5-40 mL IntraVENous PRN    acetaminophen (TYLENOL) tablet 650 mg  650 mg Oral Q6H PRN    Or    acetaminophen (TYLENOL) suppository 650 mg  650 mg Rectal Q6H PRN    polyethylene glycol (MIRALAX) packet 17 g  17 g Oral DAILY PRN    bisacodyL (DULCOLAX) suppository 10 mg  10 mg Rectal DAILY PRN    ondansetron (ZOFRAN ODT) tablet 4 mg  4 mg Oral Q8H PRN    Or    ondansetron (ZOFRAN) injection 4 mg  4 mg IntraVENous Q6H PRN    arformoteroL (BROVANA) neb solution 15 mcg  15 mcg Nebulization BID RT    budesonide (PULMICORT) 500 mcg/2 ml nebulizer suspension  500 mcg Nebulization BID RT    [Held by provider] metoprolol tartrate (LOPRESSOR) tablet 25 mg  25 mg Oral BID    insulin lispro (HUMALOG) injection   SubCUTAneous AC&HS    glucose chewable tablet 16 g  4 Tablet Oral PRN    glucagon (GLUCAGEN) injection 1 mg  1 mg IntraMUSCular PRN    dextrose 10% infusion 0-250 mL  0-250 mL IntraVENous PRN    [Held by provider] insulin glargine (LANTUS) injection 5 Units  5 Units SubCUTAneous QHS    [Held by provider] guaiFENesin ER (MUCINEX) tablet 600 mg  600 mg Oral Q12H      No Known Allergies     Review of Systems:  Pertinent items are noted in the History of Present Illness. Objective:     Visit Vitals  BP (!) 164/86 (BP 1 Location: Left arm, BP Patient Position: At rest)   Pulse 74   Temp 99 °F (37.2 °C)   Resp 18   Ht 5' 4\" (1.626 m)   Wt 97.2 kg (214 lb 4.8 oz)   SpO2 98%   Breastfeeding Unknown   BMI 36.78 kg/m²       Physical Exam:      General:  alert, oriented times 3, and severely ill and restless   Eyes:  conjunctivae and sclerae normal, pupils equal, round, reactive to light   Throat & Neck: no erythema or exudates noted and neck supple and symmetrical; no palpable masses           Abdomen:   rounded and obese, there is diffuse generalized tenderness with guarding and rebound . There is a midline laparotomy scar in the infraumbilical area that seems to be well-healed with no clear evidence of hernias .    Extremities: extremities normal, atraumatic, no cyanosis or edema   Skin: Normal.       Imaging and Lab Review:     CBC:   Lab Results   Component Value Date/Time    WBC 12.9 01/18/2023 12:11 PM    RBC 3.97 (L) 01/18/2023 12:11 PM    HGB 11.2 (L) 01/18/2023 12:11 PM    HCT 36.6 01/18/2023 12:11 PM    PLATELET 715 43/88/8769 12:11 PM     BMP:   Lab Results   Component Value Date/Time    Glucose 180 (H) 01/18/2023 12:11 PM    Sodium 135 (L) 01/18/2023 12:11 PM    Potassium 5.3 01/18/2023 12:11 PM    Chloride 104 01/18/2023 12:11 PM    CO2 25 01/18/2023 12:11 PM    BUN 53 (H) 01/18/2023 12:11 PM    Creatinine 1.91 (H) 01/18/2023 12:11 PM    Calcium 9.4 01/18/2023 12:11 PM     CMP:  Lab Results   Component Value Date/Time    Glucose 180 (H) 01/18/2023 12:11 PM    Sodium 135 (L) 01/18/2023 12:11 PM    Potassium 5.3 01/18/2023 12:11 PM    Chloride 104 01/18/2023 12:11 PM    CO2 25 01/18/2023 12:11 PM    BUN 53 (H) 01/18/2023 12:11 PM    Creatinine 1.91 (H) 01/18/2023 12:11 PM    Calcium 9.4 01/18/2023 12:11 PM    Anion gap 6 01/18/2023 12:11 PM    BUN/Creatinine ratio 28 (H) 01/18/2023 12:11 PM    Alk.  phosphatase 120 (H) 01/18/2023 12:11 PM    Protein, total 6.8 01/18/2023 12:11 PM    Albumin 3.4 01/18/2023 12:11 PM    Globulin 3.4 01/18/2023 12:11 PM    A-G Ratio 1.0 01/18/2023 12:11 PM       Recent Results (from the past 24 hour(s))   GLUCOSE, POC    Collection Time: 01/17/23  3:46 PM   Result Value Ref Range    Glucose (POC) 285 (H) 70 - 110 mg/dL   GLUCOSE, POC    Collection Time: 01/17/23  9:26 PM   Result Value Ref Range    Glucose (POC) 264 (H) 70 - 110 mg/dL   RENAL FUNCTION PANEL    Collection Time: 01/18/23  3:41 AM   Result Value Ref Range    Sodium 138 136 - 145 mmol/L    Potassium 4.8 3.5 - 5.5 mmol/L    Chloride 106 100 - 111 mmol/L    CO2 27 21 - 32 mmol/L    Anion gap 5 3.0 - 18 mmol/L    Glucose 103 (H) 74 - 99 mg/dL    BUN 49 (H) 7.0 - 18 MG/DL    Creatinine 1.89 (H) 0.6 - 1.3 MG/DL    BUN/Creatinine ratio 26 (H) 12 - 20      eGFR 28 (L) >60 ml/min/1.73m2    Calcium 9.1 8.5 - 10.1 MG/DL    Phosphorus 3.9 2.5 - 4.9 MG/DL    Albumin 3.0 (L) 3.4 - 5.0 g/dL   GLUCOSE, POC    Collection Time: 01/18/23  6:53 AM Result Value Ref Range    Glucose (POC) 109 70 - 110 mg/dL   GLUCOSE, POC    Collection Time: 01/18/23 12:04 PM   Result Value Ref Range    Glucose (POC) 167 (H) 70 - 110 mg/dL   CBC WITH AUTOMATED DIFF    Collection Time: 01/18/23 12:11 PM   Result Value Ref Range    WBC 12.9 4.6 - 13.2 K/uL    RBC 3.97 (L) 4.20 - 5.30 M/uL    HGB 11.2 (L) 12.0 - 16.0 g/dL    HCT 36.6 35.0 - 45.0 %    MCV 92.2 78.0 - 100.0 FL    MCH 28.2 24.0 - 34.0 PG    MCHC 30.6 (L) 31.0 - 37.0 g/dL    RDW 18.4 (H) 11.6 - 14.5 %    PLATELET 699 932 - 366 K/uL    MPV 9.7 9.2 - 11.8 FL    NRBC 1.1 (H) 0  WBC    ABSOLUTE NRBC 0.14 (H) 0.00 - 0.01 K/uL    NEUTROPHILS 91 (H) 40 - 73 %    LYMPHOCYTES 5 (L) 21 - 52 %    MONOCYTES 4 3 - 10 %    EOSINOPHILS 0 0 - 5 %    BASOPHILS 0 0 - 2 %    IMMATURE GRANULOCYTES 1 (H) 0.0 - 0.5 %    ABS. NEUTROPHILS 11.7 (H) 1.8 - 8.0 K/UL    ABS. LYMPHOCYTES 0.6 (L) 0.9 - 3.6 K/UL    ABS. MONOCYTES 0.5 0.05 - 1.2 K/UL    ABS. EOSINOPHILS 0.0 0.0 - 0.4 K/UL    ABS. BASOPHILS 0.0 0.0 - 0.1 K/UL    ABS. IMM. GRANS. 0.1 (H) 0.00 - 0.04 K/UL    DF AUTOMATED     METABOLIC PANEL, COMPREHENSIVE    Collection Time: 01/18/23 12:11 PM   Result Value Ref Range    Sodium 135 (L) 136 - 145 mmol/L    Potassium 5.3 3.5 - 5.5 mmol/L    Chloride 104 100 - 111 mmol/L    CO2 25 21 - 32 mmol/L    Anion gap 6 3.0 - 18 mmol/L    Glucose 180 (H) 74 - 99 mg/dL    BUN 53 (H) 7.0 - 18 MG/DL    Creatinine 1.91 (H) 0.6 - 1.3 MG/DL    BUN/Creatinine ratio 28 (H) 12 - 20      eGFR 28 (L) >60 ml/min/1.73m2    Calcium 9.4 8.5 - 10.1 MG/DL    Bilirubin, total 0.7 0.2 - 1.0 MG/DL    ALT (SGPT) 55 13 - 56 U/L    AST (SGOT) 11 10 - 38 U/L    Alk.  phosphatase 120 (H) 45 - 117 U/L    Protein, total 6.8 6.4 - 8.2 g/dL    Albumin 3.4 3.4 - 5.0 g/dL    Globulin 3.4 2.0 - 4.0 g/dL    A-G Ratio 1.0 0.8 - 1.7     MAGNESIUM    Collection Time: 01/18/23 12:11 PM   Result Value Ref Range    Magnesium 2.4 1.6 - 2.6 mg/dL   PROTHROMBIN TIME + INR Collection Time: 01/18/23 12:11 PM   Result Value Ref Range    Prothrombin time 14.7 11.5 - 15.2 sec    INR 1.1 0.8 - 1.2     GLUCOSE, POC    Collection Time: 01/18/23  1:23 PM   Result Value Ref Range    Glucose (POC) 162 (H) 70 - 110 mg/dL       images and reports reviewed    Assessment:   Chapo Malik is a 81 y.o. female who has multiple significant medical conditions including severe COPD and renal failure on dialysis in addition to other significant comorbidities who was being treated for her severe COPD and she developed pain for about 2 days duration that got worse today. The CT scan did show evidence of pneumoperitoneum with perforation. I had a long discussion with the patient as well as with the medical team and the nurse and I explained to her that though she has no fever or tachycardia and her WBC is normal she has clear evidence of pneumoperitoneum and the most concerning part for me is that she has peritoneal signs with rebound and guarding that she generalized. Plus the patient is on steroids and this could be a reason why she does not have the fever or the tachycardia. When I examined the patient she was clearly peritoneal take and not even able to move and lay back for me and giving the fact that her exam showed the peritoneal sign in addition to the pneumoperitoneum unfortunately I have no option but to take the patient for surgery. I explained to the patient that we will get a do exploratory laparotomy and based on the CT scan most likely she will end up with a Brenton procedure which I explained to her that we will take part of her colon and she will need a colostomy bag. At the beginning the patient refused a colostomy bag but I explained to her that I will not be able to do the anastomosis because of the perforation and I told her that most likely without the surgery there is a high chance of major morbidity and even mortality.   After explaining this to the patient she agreed to proceed with the surgery. I explained to her that she is at extremely high risk for major complications given her renal failure and dialysis as well as her multiple severe comorbidities including her severe COPD. I told her she is at risk of bleeding and infection as well as developing intra-abdominal collection and abscesses. Also she is at higher risk than usual for developing abdominal wall hernias. I did post the patient for surgery as soon as I finished talking to the patient.      Plan:     N.p.o.  IV fluids  Broad-spectrum antibiotics  Exploratory laparotomy, colectomy and colostomy formation  Patient may stay intubated     Please call me if you have any questions (cell phone: [phone])     Signed By: Lady Kendall MD     January 18, 2023

## 2023-01-18 NOTE — ROUTINE PROCESS
TRANSFER - IN REPORT:    Verbal report received from Itzel Anton RN on [facility]  being received from [address] for ordered procedure      Report consisted of patients Situation, Background, Assessment and   Recommendations(SBAR). Information from the following report(s) SBAR was reviewed with the receiving nurse. Opportunity for questions and clarification was provided. Assessment completed upon patients arrival to unit and care assumed.

## 2023-01-18 NOTE — BRIEF OP NOTE
Brief Postoperative Note    Patient: Benjamín Whitehead  YOB: 1950  MRN: [id number]    Date of Procedure: 1/18/2023     Pre-Op Diagnosis: perforated colon    Post-Op Diagnosis: Same as preoperative diagnosis. Procedure(s):  LAPAROTOMY EXPLORATORY, COLECTOMY, COLOSTOMY CREATION    Surgeon(s):  Jorge Castelan MD    Surgical Assistant: Surg Asst-1: Armando Singh    Anesthesia: General     Estimated Blood Loss (mL): Minimal    Complications: None    Specimens:   ID Type Source Tests Collected by Time Destination   1 : SIGMOID COLON Preservative Sigmoid  Jorge Castelan MD 1/18/2023 1730 Pathology        Implants: * No implants in log *    Drains:   [REMOVED] External Urinary Catheter 08/08/22 (Removed)       Findings: Perforated diverticulitis.      Electronically Signed by Marni Sneed MD on 1/18/2023 at 6:02 PM

## 2023-01-18 NOTE — PROGRESS NOTES
OT attempted. Pt RICHARD for procedure. 2nd attempt, 4:15, pt remains RICHARD. Will continue to follow.

## 2023-01-18 NOTE — PROGRESS NOTES
Reason for Renal Dosing:  Prescriber consult    Patient clinical status and labs ordered/reviewed. Pt Weight Weight: 97.2 kg (214 lb 4.8 oz)   Serum Creatinine Lab Results   Component Value Date/Time    Creatinine 1.91 (H) 01/18/2023 12:11 PM       Creatinine Clearance Estimated Creatinine Clearance: 31 mL/min (A) (based on SCr of 1.91 mg/dL (H)). BUN Lab Results   Component Value Date/Time    BUN 53 (H) 01/18/2023 12:11 PM       WBC Lab Results   Component Value Date/Time    WBC 12.9 01/18/2023 12:11 PM      Temperature Temp: 98.3 °F (36.8 °C)   HR Pulse (Heart Rate): 97     BP BP: (!) 156/79           Drug type: Antibiotic indicated for intra-abdominal infection      Drug/dose: for naïve patient was initiated at: Zosyn 4.5 gm IV x 1 dose (already given) followed by 3.375 gm IV q12h. Infuse over 240 minutes per protocol    Continue to monitor.     Signed Claudette Wallace, PHARMYASMEEN  Date 1/18/2023  Time 12:49 PM

## 2023-01-18 NOTE — ROUTINE PROCESS
Bedside and Verbal shift change report given to FLORENCIA Martinez (oncoming nurse) by Lester Manzo RN (offgoing nurse). Report included the following information SBAR, Kardex, and Recent Results.

## 2023-01-19 LAB
ALBUMIN SERPL-MCNC: 3.1 G/DL (ref 3.4–5)
ANION GAP SERPL CALC-SCNC: 9 MMOL/L (ref 3–18)
BUN SERPL-MCNC: 69 MG/DL (ref 7–18)
BUN/CREAT SERPL: 25 (ref 12–20)
CALCIUM SERPL-MCNC: 9.3 MG/DL (ref 8.5–10.1)
CHLORIDE SERPL-SCNC: 105 MMOL/L (ref 100–111)
CO2 SERPL-SCNC: 22 MMOL/L (ref 21–32)
CREAT SERPL-MCNC: 2.81 MG/DL (ref 0.6–1.3)
GLUCOSE BLD STRIP.AUTO-MCNC: 178 MG/DL (ref 70–110)
GLUCOSE BLD STRIP.AUTO-MCNC: 185 MG/DL (ref 70–110)
GLUCOSE BLD STRIP.AUTO-MCNC: 202 MG/DL (ref 70–110)
GLUCOSE SERPL-MCNC: 165 MG/DL (ref 74–99)
PHOSPHATE SERPL-MCNC: 7.6 MG/DL (ref 2.5–4.9)
POTASSIUM SERPL-SCNC: 5.9 MMOL/L (ref 3.5–5.5)
SODIUM SERPL-SCNC: 136 MMOL/L (ref 136–145)

## 2023-01-19 PROCEDURE — 87077 CULTURE AEROBIC IDENTIFY: CPT

## 2023-01-19 PROCEDURE — C9113 INJ PANTOPRAZOLE SODIUM, VIA: HCPCS | Performed by: EMERGENCY MEDICINE

## 2023-01-19 PROCEDURE — 94640 AIRWAY INHALATION TREATMENT: CPT

## 2023-01-19 PROCEDURE — 74011250636 HC RX REV CODE- 250/636: Performed by: SURGERY

## 2023-01-19 PROCEDURE — 82962 GLUCOSE BLOOD TEST: CPT

## 2023-01-19 PROCEDURE — 74011000258 HC RX REV CODE- 258: Performed by: SURGERY

## 2023-01-19 PROCEDURE — 99232 SBSQ HOSP IP/OBS MODERATE 35: CPT | Performed by: EMERGENCY MEDICINE

## 2023-01-19 PROCEDURE — 87205 SMEAR GRAM STAIN: CPT

## 2023-01-19 PROCEDURE — 87186 SC STD MICRODIL/AGAR DIL: CPT

## 2023-01-19 PROCEDURE — 77010033678 HC OXYGEN DAILY

## 2023-01-19 PROCEDURE — 36415 COLL VENOUS BLD VENIPUNCTURE: CPT

## 2023-01-19 PROCEDURE — 74011636637 HC RX REV CODE- 636/637: Performed by: SURGERY

## 2023-01-19 PROCEDURE — 90935 HEMODIALYSIS ONE EVALUATION: CPT

## 2023-01-19 PROCEDURE — 74011250636 HC RX REV CODE- 250/636: Performed by: EMERGENCY MEDICINE

## 2023-01-19 PROCEDURE — 74011000250 HC RX REV CODE- 250: Performed by: EMERGENCY MEDICINE

## 2023-01-19 PROCEDURE — 74011000250 HC RX REV CODE- 250: Performed by: SURGERY

## 2023-01-19 PROCEDURE — 94761 N-INVAS EAR/PLS OXIMETRY MLT: CPT

## 2023-01-19 PROCEDURE — 80069 RENAL FUNCTION PANEL: CPT

## 2023-01-19 PROCEDURE — C9113 INJ PANTOPRAZOLE SODIUM, VIA: HCPCS | Performed by: SURGERY

## 2023-01-19 PROCEDURE — 87185 SC STD ENZYME DETCJ PER NZM: CPT

## 2023-01-19 PROCEDURE — 65270000029 HC RM PRIVATE

## 2023-01-19 RX ORDER — OXYCODONE AND ACETAMINOPHEN 5; 325 MG/1; MG/1
1 TABLET ORAL
Status: DISCONTINUED | OUTPATIENT
Start: 2023-01-19 | End: 2023-01-25 | Stop reason: HOSPADM

## 2023-01-19 RX ORDER — NALOXONE HYDROCHLORIDE 0.4 MG/ML
0.4 INJECTION, SOLUTION INTRAMUSCULAR; INTRAVENOUS; SUBCUTANEOUS AS NEEDED
Status: DISCONTINUED | OUTPATIENT
Start: 2023-01-19 | End: 2023-01-25 | Stop reason: HOSPADM

## 2023-01-19 RX ORDER — TRAMADOL HYDROCHLORIDE 50 MG/1
50 TABLET ORAL
Status: DISCONTINUED | OUTPATIENT
Start: 2023-01-19 | End: 2023-01-25 | Stop reason: HOSPADM

## 2023-01-19 RX ORDER — LORAZEPAM 2 MG/ML
0.5 INJECTION INTRAMUSCULAR ONCE
Status: ACTIVE | OUTPATIENT
Start: 2023-01-19 | End: 2023-01-19

## 2023-01-19 RX ORDER — METOPROLOL TARTRATE 5 MG/5ML
2.5 INJECTION INTRAVENOUS EVERY 6 HOURS
Status: DISCONTINUED | OUTPATIENT
Start: 2023-01-20 | End: 2023-01-20

## 2023-01-19 RX ADMIN — IPRATROPIUM BROMIDE 0.5 MG: 0.5 SOLUTION RESPIRATORY (INHALATION) at 21:23

## 2023-01-19 RX ADMIN — BUDESONIDE 500 MCG: 0.5 INHALANT RESPIRATORY (INHALATION) at 08:06

## 2023-01-19 RX ADMIN — PANTOPRAZOLE SODIUM 40 MG: 40 INJECTION, POWDER, FOR SOLUTION INTRAVENOUS at 21:53

## 2023-01-19 RX ADMIN — PIPERACILLIN AND TAZOBACTAM 3.38 G: 3; .375 INJECTION, POWDER, FOR SOLUTION INTRAVENOUS at 21:04

## 2023-01-19 RX ADMIN — IPRATROPIUM BROMIDE 0.5 MG: 0.5 SOLUTION RESPIRATORY (INHALATION) at 08:06

## 2023-01-19 RX ADMIN — HYDROMORPHONE HYDROCHLORIDE 1 MG: 1 INJECTION, SOLUTION INTRAMUSCULAR; INTRAVENOUS; SUBCUTANEOUS at 05:23

## 2023-01-19 RX ADMIN — SODIUM CHLORIDE, PRESERVATIVE FREE 10 ML: 5 INJECTION INTRAVENOUS at 05:17

## 2023-01-19 RX ADMIN — BUDESONIDE 500 MCG: 0.5 INHALANT RESPIRATORY (INHALATION) at 21:23

## 2023-01-19 RX ADMIN — ARFORMOTEROL TARTRATE 15 MCG: 15 SOLUTION RESPIRATORY (INHALATION) at 08:06

## 2023-01-19 RX ADMIN — HYDROMORPHONE HYDROCHLORIDE 1 MG: 1 INJECTION, SOLUTION INTRAMUSCULAR; INTRAVENOUS; SUBCUTANEOUS at 01:38

## 2023-01-19 RX ADMIN — HYDROCORTISONE SODIUM SUCCINATE 100 MG: 100 INJECTION, POWDER, FOR SOLUTION INTRAMUSCULAR; INTRAVENOUS at 05:17

## 2023-01-19 RX ADMIN — HYDROMORPHONE HYDROCHLORIDE 1 MG: 1 INJECTION, SOLUTION INTRAMUSCULAR; INTRAVENOUS; SUBCUTANEOUS at 23:31

## 2023-01-19 RX ADMIN — HYDROMORPHONE HYDROCHLORIDE 1 MG: 1 INJECTION, SOLUTION INTRAMUSCULAR; INTRAVENOUS; SUBCUTANEOUS at 11:53

## 2023-01-19 RX ADMIN — SODIUM CHLORIDE, PRESERVATIVE FREE 10 ML: 5 INJECTION INTRAVENOUS at 21:07

## 2023-01-19 RX ADMIN — METOPROLOL TARTRATE 2.5 MG: 5 INJECTION INTRAVENOUS at 23:30

## 2023-01-19 RX ADMIN — IPRATROPIUM BROMIDE 0.5 MG: 0.5 SOLUTION RESPIRATORY (INHALATION) at 14:10

## 2023-01-19 RX ADMIN — ARFORMOTEROL TARTRATE 15 MCG: 15 SOLUTION RESPIRATORY (INHALATION) at 21:23

## 2023-01-19 RX ADMIN — Medication 3 UNITS: at 17:20

## 2023-01-19 RX ADMIN — HYDROCORTISONE SODIUM SUCCINATE 100 MG: 100 INJECTION, POWDER, FOR SOLUTION INTRAMUSCULAR; INTRAVENOUS at 17:21

## 2023-01-19 RX ADMIN — Medication 6 UNITS: at 23:29

## 2023-01-19 RX ADMIN — HYDROCORTISONE SODIUM SUCCINATE 100 MG: 100 INJECTION, POWDER, FOR SOLUTION INTRAMUSCULAR; INTRAVENOUS at 23:30

## 2023-01-19 RX ADMIN — HYDROMORPHONE HYDROCHLORIDE 1 MG: 1 INJECTION, SOLUTION INTRAMUSCULAR; INTRAVENOUS; SUBCUTANEOUS at 17:21

## 2023-01-19 NOTE — PROGRESS NOTES
[facility] Hospitalist Group  Progress Note    Patient: Velasquez Vaughn Age: 81 y.o. : 1950 MR#: [id number] SSN: [id number]  Date/Time: 2023    Subjective:     Patient is laying in bed in no apparent distress, pleasantly confused.  is at bedside    Assessment/Plan:    81year old female w/ h/o ESRD, COPD not on home 02, HFpEF admitted to the ED after presenting with c/o severe SOB and wheezing.    -Abdominal pain, pneumoperitoneum, diverticulitis on CT scan    -COPD w/ acute exacerbation: on IV steroid, bronchodilators, on supplemental 02. Has ambulatory hypoxia.     HISTORY OF:  -HTN  -CAD  -HFpEF: last echo in 2022-->EF: 50-55%  -Type 2DM:   -CAD    PLAN:  Status post laparotomy and sigmoid colectomy and colostomy  Continue antibiotics, ID consulted  Diet as per surgeon  Status post hemodialysis today by nephrology  -Cont bronchodilators, 02 support  -Continue steroids  -Cont HD  -PT and OT  Discussed with patient and  at bedside    Dispo: Based on PT OT  Additional Notes:      Case discussed with:  [x]Patient  []Family  []Nursing  []Case Management  DVT Prophylaxis:  []Lovenox  [x]Hep SQ  []SCDs  []Coumadin   []On Heparin gtt    Objective:   VS: Visit Vitals  /72 (BP 1 Location: Left upper arm, BP Patient Position: Lying)   Pulse (!) 106   Temp 98.1 °F (36.7 °C)   Resp 20   Ht 5' 4\" (1.626 m)   Wt 97.2 kg (214 lb 4.8 oz)   SpO2 92%   Breastfeeding Unknown   BMI 36.78 kg/m²      Tmax/24hrs: Temp (24hrs), Av.8 °F (36.6 °C), Min:97 °F (36.1 °C), Max:99.1 °F (37.3 °C)    Input/Output:   Intake/Output Summary (Last 24 hours) at 2023 1526  Last data filed at 2023 1241  Gross per 24 hour   Intake 500 ml   Output 2703 ml   Net -2203 ml         General:  Awake, follows simple commands  Cardiovascular:  S1S2+, RRR  Pulmonary:  CTA b/l  GI:  Soft, BS+, ND, has colostomy  Extremities:  trace edema        Labs:    Recent Results (from the past 24 hour(s))   GLUCOSE, POC    Collection Time: 01/18/23  6:42 PM   Result Value Ref Range    Glucose (POC) 183 (H) 70 - 110 mg/dL   GLUCOSE, POC    Collection Time: 01/18/23  9:37 PM   Result Value Ref Range    Glucose (POC) 174 (H) 70 - 110 mg/dL   RENAL FUNCTION PANEL    Collection Time: 01/19/23  2:35 AM   Result Value Ref Range    Sodium 136 136 - 145 mmol/L    Potassium 5.9 (H) 3.5 - 5.5 mmol/L    Chloride 105 100 - 111 mmol/L    CO2 22 21 - 32 mmol/L    Anion gap 9 3.0 - 18 mmol/L    Glucose 165 (H) 74 - 99 mg/dL    BUN 69 (H) 7.0 - 18 MG/DL    Creatinine 2.81 (H) 0.6 - 1.3 MG/DL    BUN/Creatinine ratio 25 (H) 12 - 20      eGFR 18 (L) >60 ml/min/1.73m2    Calcium 9.3 8.5 - 10.1 MG/DL    Phosphorus 7.6 (H) 2.5 - 4.9 MG/DL    Albumin 3.1 (L) 3.4 - 5.0 g/dL   GLUCOSE, POC    Collection Time: 01/19/23  7:36 AM   Result Value Ref Range    Glucose (POC) 185 (H) 70 - 110 mg/dL     Additional Data Reviewed:      Dragon medical dictation software was used for portions of this report. Unintended errors may occur.       Signed By: Kusum Martin MD     January 19, 2023

## 2023-01-19 NOTE — PROGRESS NOTES
Pt not seen for skilled PT due to:  [ ]  Nausea/vomiting  [ ]  Eating  [ ]  Pain  [ ]  Pt lethargic  [x ]  Off Unit for HD  Will f/u later as schedule allows. Thank you.   Higinio Abbasi PT, DPT

## 2023-01-19 NOTE — OP NOTES
[facility]  OPERATIVE REPORT    Name:  Vignesh Lacy  MR#:   [id number]  :  1950  ACCOUNT #:  [de-identified]  DATE OF SERVICE:  2023    PREOPERATIVE DIAGNOSIS:  Perforated diverticulitis. POSTOPERATIVE DIAGNOSIS:  Perforated diverticulitis. PROCEDURES PERFORMED:  Exploratory laparotomy, sigmoid colectomy, and descending colostomy. SURGEON:  Collin Kaufman. Regla August MD.    ASSISTANTCoolidge Cooks, CSA. ANESTHESIA:  General.    COMPLICATIONS:  None. SPECIMENS REMOVED:  Sigmoid colon. IMPLANTS:  None. ESTIMATED BLOOD LOSS:  Minimal.    DETAILS OF PROCEDURE:  The patient was brought to the operating room. Anesthesia was induced. Scrubbing and draping of the abdomen were done in the usual manner. A time-out was performed. A midline laparotomy was performed. It was performed above and below the umbilicus. The patient had a previous lower abdominal surgery. At this point, we identified the fascia in the supraumbilical area. We opened the fascia with cautery and then we identified the preperitoneal space, which we grabbed with DeBakey's and Metzenbaum's, and we opened the peritoneum and we entered the abdomen. There was no succus or fluid when we entered the abdomen, but there were significant adhesions between the omentum and the left side of the abdominal wall as well as part of the midline just to the left side. At this point, we continued with the dissection with finger dissection just to make sure the omentum was moved and then we opened the fascia in the midline all the way. Then, when we entered the abdomen, there was evidence of perforated sigmoid diverticulitis with some pus and a small amount of stool located in an area around the sigmoid colon and small bowel and omentum was stuck into it as well. At this point, we were able to dissect the omentum completely and lift it up.   The sigmoid colon was very inflamed and thickened and attached to the pelvic wall as well, so we manipulated the sigmoid colon gently and we stayed as close as possible to the sigmoid area. We identified the rectosigmoid junction and it was normal, so we dissected this area gently as well to mobilize it, and then we mobilized the descending colon by dividing the white line of Toldt and mobilizing the splenic flexure as well to bring part of the splenic flexure and distal transverse colon down. I was able to dissect the colon from the mesentery, which was thickened and inflamed as well. I fired the MEME blue load at the junction between the distal sigmoid and upper rectum and we used a MEME 75. Then, I fired another MEME at a normal area of the descending colon and then the mesentery was taken with a LigaSure Impact and we sent the specimen for permanent pathology. Hemostasis was secured with multiple irrigations to drain all the fluid collection and abscesses and then we identified the area where the colostomy was marked by the Wound Care Team.  We did an elliptical skin excision at this area and we dissected through the fat. The patient was morbidly obese, so there was significant amount of space. We identified the fascia and we were able to open the fascia and then open the peritoneum. Then, we were able to grab the descending colon and lift it up. There was a good distance and it did not retract. The descending colon looked healthy with no evidence of any ischemia. At this point, we made sure to run the small bowel and there was no evidence of any perforation. There was good hemostasis as well. We did a count of the lap count and they were correct. So, we closed the fascia with #1 PDS non-looped in a running fashion from above and below. Then, we irrigated the subcutaneous tissue as much as possible and we made sure good hemostasis was secured. Then, we closed the skin loosely with skin stapler.   At that point, we fashioned the colostomy with a 3-0 Vicryl and then we placed a colostomy bag and sterile dressing at the midline wound.       Sadie Benavidez MD      [id number]  D:  01/18/2023 18:51  T:  01/19/2023 4:24  JOB #:  [id number]

## 2023-01-19 NOTE — WOUND CARE
Physical Exam  Room 459: met with pt & her  at bedside at this time for new colostomy care & teaching, pt is confused, hallucinating things that are not in room, & pt is upset that no one believes her. Pt's  is upset that pt is \"out of her mind\". Discussed with [facility] RN in [facility]. Unable to assess pt at this time. Will plan to see pt again at another time.   Missael LOERAN, RN, [facility], [address]

## 2023-01-19 NOTE — ROUTINE PROCESS
TRANSFER - IN REPORT:    Verbal report received from PACU nurse(name) on Virgilio Pillar  being received from Pacu(unit) for routine post - op      Report consisted of patients Situation, Background, Assessment and   Recommendations(SBAR). Information from the following report(s) SBAR and Kardex was reviewed with the receiving nurse. Opportunity for questions and clarification was provided. Assessment completed upon patients arrival to unit and care assumed.

## 2023-01-19 NOTE — PROGRESS NOTES
RENAL DAILY PROGRESS NOTE              Subjective:       Complaint:   Overnight events noted  no nausea, vomiting, chest pain  Breathing is better  Had go to OR for perforated diverticulum and now has colostomy  Had HD earlier. Filter clotted early  Anxious currently as she feels she had a near death experience  IMPRESSION:   IMPRESSION:   ESRD TTS  Access: TDC,maturing av fistula on right  arm(placed 3 weeks ago-awaiting rpt procedure for superfilization)   Abdominal perforation due to diverticulitis. COPD excerebration. CAD,cardiomyopathy  Secondary hyperparathyroidism  ACD   PLAN:   Will keep her on schedule with dialysis. Some bleeding in colostomy bag. Will hold heparin during HD  C/w epogen  Phos is controlled. Consult   Will let primary team know about her abdominal pain. Might need imaging. Avoid Gadolinium due to its association with nephrogenic systemic fibrosis in a patients with severe ARF and ESRD. Please dose all medications for creatinine clearance <15/dialysis. Avoid blood pressure checks, blood draws, peripheral iv's on arm with access.                  Current Facility-Administered Medications   Medication Dose Route Frequency    oxyCODONE-acetaminophen (PERCOCET) 5-325 mg per tablet 1 Tablet  1 Tablet Oral Q4H PRN    traMADoL (ULTRAM) tablet 50 mg  50 mg Oral Q6H PRN    LORazepam (ATIVAN) injection 0.5 mg  0.5 mg IntraVENous ONCE    pantoprazole (PROTONIX) injection 40 mg  40 mg IntraVENous Q12H    0.9% sodium chloride infusion  50 mL/hr IntraVENous CONTINUOUS    piperacillin-tazobactam (ZOSYN) 3.375 g in 0.9% sodium chloride (MBP/ADV) 100 mL MBP  3.375 g IntraVENous Q12H    hydrocortisone Sod Succ (PF) (SOLU-CORTEF) injection 100 mg  100 mg IntraVENous Q6H    HYDROmorphone (DILAUDID) injection 1 mg  1 mg IntraVENous Q3H PRN    sodium chloride (OCEAN) 0.65 % nasal squeeze bottle 2 Spray  2 Spray Both Nostrils Q2H PRN    ipratropium (ATROVENT) 0.02 % nebulizer solution 0.5 mg  0.5 mg Nebulization Q6H RT    [Held by provider] melatonin tablet 5 mg  5 mg Oral QHS    [Held by provider] heparin (porcine) injection 5,000 Units  5,000 Units SubCUTAneous Q8H    epoetin tonny-epbx (RETACRIT) injection 4,000 Units  4,000 Units SubCUTAneous Q TUE, THU & SAT    [Held by provider] aspirin chewable tablet 162 mg  162 mg Oral DAILY    [Held by provider] atorvastatin (LIPITOR) tablet 80 mg  80 mg Oral QHS    [Held by provider] ezetimibe (ZETIA) tablet 10 mg  10 mg Oral DAILY    [Held by provider] gabapentin (NEURONTIN) capsule 400 mg  400 mg Oral DAILY    sodium chloride (NS) flush 5-40 mL  5-40 mL IntraVENous Q8H    sodium chloride (NS) flush 5-40 mL  5-40 mL IntraVENous PRN    acetaminophen (TYLENOL) tablet 650 mg  650 mg Oral Q6H PRN    Or    acetaminophen (TYLENOL) suppository 650 mg  650 mg Rectal Q6H PRN    polyethylene glycol (MIRALAX) packet 17 g  17 g Oral DAILY PRN    bisacodyL (DULCOLAX) suppository 10 mg  10 mg Rectal DAILY PRN    ondansetron (ZOFRAN ODT) tablet 4 mg  4 mg Oral Q8H PRN    Or    ondansetron (ZOFRAN) injection 4 mg  4 mg IntraVENous Q6H PRN    arformoteroL (BROVANA) neb solution 15 mcg  15 mcg Nebulization BID RT    budesonide (PULMICORT) 500 mcg/2 ml nebulizer suspension  500 mcg Nebulization BID RT    [Held by provider] metoprolol tartrate (LOPRESSOR) tablet 25 mg  25 mg Oral BID    insulin lispro (HUMALOG) injection   SubCUTAneous AC&HS    glucose chewable tablet 16 g  4 Tablet Oral PRN    glucagon (GLUCAGEN) injection 1 mg  1 mg IntraMUSCular PRN    dextrose 10% infusion 0-250 mL  0-250 mL IntraVENous PRN    [Held by provider] insulin glargine (LANTUS) injection 5 Units  5 Units SubCUTAneous QHS    [Held by provider] guaiFENesin ER (MUCINEX) tablet 600 mg  600 mg Oral Q12H       Review of Symptoms: comprehensive ROS negative except above.    Objective:   Patient Vitals for the past 24 hrs:   Temp Pulse Resp BP SpO2   01/19/23 1515 98.1 °F (36.7 °C) (!) 106 20 111/72 92 %   01/19/23 1250 97 °F (36.1 °C) (!) 102 20 127/75 --   01/19/23 1241 -- (!) 111 20 115/66 --   01/19/23 1230 -- (!) 58 -- 94/61 --   01/19/23 1215 -- (!) 120 18 110/67 --   01/19/23 1200 -- (!) 113 18 104/69 --   01/19/23 1145 -- (!) 110 18 (!) 172/100 --   01/19/23 1130 -- (!) 118 18 (!) 140/82 --   01/19/23 1115 -- 73 18 131/87 --   01/19/23 1100 -- (!) 106 18 (!) 149/83 --   01/19/23 1045 -- 100 18 (!) 164/86 --   01/19/23 1030 -- (!) 110 18 139/80 --   01/19/23 1015 -- (!) 113 18 (!) 167/77 --   01/19/23 1000 -- (!) 107 18 (!) 168/86 --   01/19/23 0945 -- (!) 56 18 104/83 --   01/19/23 0930 -- (!) 120 18 (!) 143/95 --   01/19/23 0915 -- 92 18 (!) 171/80 --   01/19/23 0905 -- (!) 125 18 (!) 201/77 --   01/19/23 0901 99.1 °F (37.3 °C) (!) 127 18 (!) 198/100 --   01/19/23 0847 98 °F (36.7 °C) 94 16 (!) 171/90 97 %   01/19/23 0807 -- -- -- -- 98 %   01/19/23 0412 98 °F (36.7 °C) 88 16 (!) 157/76 98 %   01/19/23 0400 -- 88 -- -- --   01/19/23 0030 98 °F (36.7 °C) 80 16 (!) 140/60 96 %   01/19/23 0000 -- 80 -- -- --   01/18/23 2121 97.2 °F (36.2 °C) 81 16 (!) 168/87 96 %   01/18/23 2050 -- 80 14 (!) 182/86 97 %   01/18/23 2045 -- 80 -- -- 96 %   01/18/23 2040 -- 79 13 (!) 188/85 97 %   01/18/23 2034 -- 80 16 (!) 178/78 98 %   01/18/23 2024 -- 81 18 (!) 181/84 100 %   01/18/23 2014 -- 81 14 (!) 175/80 99 %   01/18/23 2007 -- 80 14 (!) 175/81 99 %   01/18/23 1949 -- 84 16 (!) 183/79 98 %   01/18/23 1944 -- 84 17 (!) 186/84 93 %   01/18/23 1934 -- 86 11 (!) 184/87 96 %   01/18/23 1924 -- 87 15 (!) 178/88 99 %   01/18/23 1914 -- 85 19 (!) 181/84 95 %   01/18/23 1904 -- 80 18 (!) 186/87 95 %   01/18/23 1846 -- 83 19 (!) 164/103 99 %   01/18/23 1838 -- -- -- (!) 171/101 --   01/18/23 1828 -- 83 17 (!) 166/79 98 %   01/18/23 1820 -- 83 17 -- 98 %   01/18/23 1813 97.3 °F (36.3 °C) 85 16 (!) 161/76 98 %          Weight change:      01/17 1901 - 01/19 0700  In: 500 [I.V.:500]  Out: 30     Intake/Output Summary (Last 24 hours) at 1/19/2023 1606  Last data filed at 1/19/2023 1241  Gross per 24 hour   Intake 500 ml   Output 2703 ml   Net -2203 ml       Physical Exam:   General: comfortable, no acute distress   HEENT sclera anicteric, supple neck, no thyromegaly  CVS: S1S2 heard,  no rub  RS: + air entry b/l  Abd: Soft,colostomy  Neuro: non focal, awake, alert , CN II-XII are grossly intact  Extrm: no edema, no cyanosis, clubbing   Skin: no visible  Rash  Musculoskeletal: No gross joints or bone deformities   Access: maturing right arm access      Data Review:     LABS:   Hematology:   Recent Labs     01/18/23  1211 01/17/23  1313   WBC 12.9 11.5   HGB 11.2* 12.3   HCT 36.6 39.7       Chemistry:   Recent Labs     01/19/23  0235 01/18/23  1211 01/18/23  0341 01/17/23  1313 01/17/23  0238   BUN 69* 53* 49*  --  90*   CREA 2.81* 1.91* 1.89*  --  2.49*   CA 9.3 9.4 9.1  --  8.1*   ALB 3.1* 3.4 3.0* 3.6 3.2*   K 5.9* 5.3 4.8  --  4.8    135* 138  --  134*    104 106  --  106   CO2 22 25 27  --  22   PHOS 7.6*  --  3.9  --  4.9   * 180* 103*  --  129*              Procedures/imaging: see electronic medical records for all procedures, Xrays and details which were not copied into this note but were reviewed prior to creation of Plan          Assessment & Plan:       See above      Joesph Menezes MD  1/19/2023

## 2023-01-19 NOTE — PROGRESS NOTES
Problem: Falls - Risk of  Goal: *Absence of Falls  Description: Document Trudi Church Fall Risk and appropriate interventions in the flowsheet.   Outcome: Progressing Towards Goal  Note: Fall Risk Interventions:            Medication Interventions: Assess postural VS orthostatic hypotension, Evaluate medications/consider consulting pharmacy, Patient to call before getting OOB, Teach patient to arise slowly         History of Falls Interventions: Door open when patient unattended, Evaluate medications/consider consulting pharmacy, Room close to nurse's station         Problem: Patient Education: Go to Patient Education Activity  Goal: Patient/Family Education  Outcome: Progressing Towards Goal     Problem: Patient Education: Go to Patient Education Activity  Goal: Patient/Family Education  Outcome: Progressing Towards Goal     Problem: Patient Education: Go to Patient Education Activity  Goal: Patient/Family Education  Outcome: Progressing Towards Goal     Problem: Nutrition Deficit  Goal: *Optimize nutritional status  Outcome: Progressing Towards Goal

## 2023-01-19 NOTE — PROGRESS NOTES
New OT orders received and acknowledged. Pt currently on OT caseload complicated by surgery and dialysis.

## 2023-01-19 NOTE — ROUTINE PROCESS
Patient is alert and oriented times three with no signs or symptoms of distress.  Colonstomy is beefy red no skin breakdown

## 2023-01-19 NOTE — ROUTINE PROCESS
Bedside shift change report given to bernarda rob (oncoming nurse) by Heidi Lowry (offgoing nurse). Report included the following information SBAR and Kardex 0700.

## 2023-01-19 NOTE — PERIOP NOTES
Vicki Blake TRANSFER - OUT REPORT:    Telephone report given to Savanah(name) on Yazmin Calderon  being transferred to [facility](unit) for routine post - op       Report consisted of patients Situation, Background, Assessment and   Recommendations(SBAR). Information from the following report(s) SBAR, OR Summary, and MAR was reviewed with the receiving nurse. Lines:   Peripheral IV 01/14/23 Distal;Left;Posterior Forearm (Active)   Site Assessment Clean, dry, & intact 01/18/23 1813   Phlebitis Assessment 0 01/18/23 1813   Infiltration Assessment 0 01/18/23 1813   Dressing Status Clean, dry, & intact 01/18/23 1813   Dressing Type Tape;Transparent 01/18/23 1813   Hub Color/Line Status Blue; Infusing 01/18/23 1813   Alcohol Cap Used Yes 01/17/23 1606       Peripheral IV 01/17/23 Anterior;Left;Proximal Forearm (Active)   Site Assessment Clean, dry, & intact 01/18/23 1813   Phlebitis Assessment 0 01/18/23 1813   Infiltration Assessment 0 01/18/23 1813   Dressing Status Clean, dry, & intact 01/18/23 1813   Dressing Type Tape;Transparent 01/18/23 1813   Hub Color/Line Status Pink;Capped 01/18/23 1813   Action Taken Open ports on tubing capped 01/18/23 0813   Alcohol Cap Used Yes 01/18/23 0813        Opportunity for questions and clarification was provided.       Patient transported with:   O2 @ 3 liters  Tech

## 2023-01-19 NOTE — PROGRESS NOTES
INTERVENTION:  HEMODYNAMIC STABILIZATION  MAINTAIN BP WNL WHILE ON HD. INTERVENTION:  FLUID MANAGEMENT  WILL ATTEMPT 3500 ML TOTAL FLUID REMOVAL AS TOLERATED. INTERVENTION:  METABOLIC/ELECTROLYTE MANAGEMENT  2.0 POTASSIUM 2.5 CALCIUM DIALYSATE USED WITH HD TODAY. INTERVENTION:  HEMODIALYSIS ACCESS SITE MANAGEMENT  RIGHT SIDE CVC ACCESSED USING ASEPTIC TECHNIQUE. GOAL:  SIGNS AND SYMPTOMS OF LISTED POTENTIAL PROBLEMS WILL BE ABSENT OR MANAGEABLE. OUTCOME:  PROGRESSING. HD PLANNED FOR 3.5 HOURS TODAY.        Problem: Chronic Renal Failure  Goal: *Fluid and electrolytes stabilized  Outcome: Progressing Towards Goal     Problem: Patient Education: Go to Patient Education Activity  Goal: Patient/Family Education  Outcome: Progressing Towards Goal

## 2023-01-19 NOTE — PROGRESS NOTES
EPO 4,000 units q TTS  ESRD on HD  No Dose Today  HGB > 11    Lab Results   Component Value Date/Time    HGB 11.2 (L) 01/18/2023 12:11 PM     Dose will be held today. Order modified to begin on Saturday 1/21. If Dose is held a second time Dr Walton/Nephrology will need to be notified to discuss a possible dose reduction.

## 2023-01-19 NOTE — PROGRESS NOTES
Received pre HD report from Tian Kumar RN. Pt in bed, A+O x4, crying hysterically and yelling \"they are trying to kill me, I don't wanna die\". Pt is on O2 via NC @ 2L. @4587 Call placed to primary RN for medication to help calm patient. Accessed right side CVC per protocol. Tx initiated at 0905. CVC flowing with ease after reversing lines. For hemodynamic stability UF goal 3500 ml. Offered assistance with repositioning every 2 hours. Vascular access visible at all times during treatment, line connections intact at all times. Patient arterial pressure constantly alarming d/t patients hysteria,  several attempts to made to try to calm patient while awaiting primary nurse to bring medication to assist.    @ , patient HD circuit clotted d/t the constant alarming which caused the machine to alarm and stop running. Unable to return patient blood. Est. Blood loss 250cc. Md contacted and made aware. @1040, new circuit setup and treatment resumed. Prior to start of treatment patient given Heparin 2000 unit bolus. Patient has continued to cry hysterically and arterial pressures still alarming causing pump to constantly stop.    @1120 call placed to primary RN to update on medications to assist patient in calming down. @  primary RN at bedside to give medication. @1663 patient HD circuit (arterial chamber) with noted clots,  Patient treatment ended 43 minutes early, patient blood returned. Md made aware. Tx completed at [id number], tolerated well 2.6L removed. De-accessed per protocol. Heparin indwell 1.9ml in arterial, and 1.9ml in venous catheter. Unit nurse given report.                      ACUTE HEMODIALYSIS FLOW SHEET      HEMODIALYSIS ORDERS: Physician:  Ryan Olmos     Dialyzer: Revaclear   Duration: 3.5   BFR: 300  DFR: 500   Dialysate:  Temp 36-37*C   K+  2    Ca+ 2.5   Na 138  Bicarb 35   Wt Readings from Last 1 Encounters:        Patient Chart [x]   Unable to Obtain []  Dry weight/UF Goal:3500   Heparin [x] 2000 Bolus    Units    [] Hourly    Units    []None       Pre BP  198/100    Pulse: 127 Respirations: 28 Temp: 99.1 [x] Temporal  [] Ax  [] Esoph   Labs: []  Pre  []  Post:   [x] N/A   Additional Orders (medications, blood products, hypotension management): [] Yes   [x] No       [x]   [facility] Consent Verified       CATHETER ACCESS:  []N/A   [x]Right   []Left   []IJ   []Fem  [x]Chest wall  []TransHepatic   [] First use X-ray verified     [x]Tunnel    [] Non Tunneled   [x]No S/S infection  []Redness  []Drainage []Cultured []Swelling []Pain   [x]Medical Aseptic Prep Utilized   []Dressing Changed  [x] Biopatch  Date: 1/14/23   []Clotted   [x]Patent   Flows: []Good  [x]Poor  [x]Reversed   If access problem,  notified: []Yes    [x]N/A          GENERAL ASSESSMENT:    LUNGS:  Resp Rate 28   [] Clear  [] Coarse  [x] Crackles  [] Wheezing  [] Diminished                                                           [] RLL   [] LLL  [] RUL   [] ISAURO            Respirations:  []Easy  [x]Labored  []N/A  Cough:  []Productive  []Dry  [x]N/A               Therapy:  []RA   [] Ventilated   [] Intubated   [] Trach            O2 Device:  [x] NC   [] NRB  [] Trach Mask  [] BiPaP  Flow:  2 l/min                                                    CARDIAC: [] Regular      [x] Irregular   [] Rhythm:          [] Monitored   [] Bedside   [] Remotely monitored       EDEMA: [] None   [x]Generalized  [] Pitting [] 1+   [] 2 +   [] 3+    [] 4+        SKIN:   [] Hot     [] Cold    [x] Warm   [x] Dry    [] Diaphoretic                 [] Flushed  [] Jaundiced  [] Cyanotic  [] Pale      LOC:    [x] Alert      [x]Oriented:    [x] Person     [x] Place   [x]Time               [] Confused  [] Lethargic  [] Medicated  [] Non-responsive  [] Non-Verbal     GI / ABDOMEN:                     [] Flat    [x] Distended    [x] Soft    [] Firm   []  Obese                   [] Diarrhea   [] FMS [] Bowel Sounds  [] Nausea  [] Vomiting [] NGT  [] OGT  [] PEG  [] Tube Feedings @     mL/hr     / URINE ASSESSMENT:                   [] Voiding    [x] Oliguria  [] Anuria                     []  Frye   [] Incontinent  []  Incontinent Brief   []  PureWick     PAIN:  [] 0 []1  []2   []3   []4   []5   [x]6 at abdominal incision  []7   []8   []9   []10                MOBILITY:  [x] Bed    [] Stretcher      All Vitals and Treatment Details on Attached [address]: [facility]          Room # 459/01    [x] Routine         [] 1st Time Acute/Chronic   [] Urgent      [] Stat              [x] Acute Room   []  Bedside    [] ICU/CCU     [] ER     Isolation Precautions:  [x] Dialysis    There are currently no Active Isolations     ALLERGIES:     No Known Allergies     Code Status:  Full Code     Hepatitis Status      Lab Results   Component Value Date/Time    Hepatitis A, IgM NEGATIVE 03/23/2015 09:04 AM    Hepatitis B surface Ag <0.10 12/09/2022 07:03 PM    Hepatitis B surface Ab >1,000.00 12/09/2022 07:03 PM    Hepatitis B core, IgM NEGATIVE 03/23/2015 09:04 AM    Hepatitis C virus Ab 0.09 03/23/2015 09:04 AM        Current Labs:      Lab Results   Component Value Date/Time    WBC 12.9 01/18/2023 12:11 PM    HGB 11.2 (L) 01/18/2023 12:11 PM    HCT 36.6 01/18/2023 12:11 PM    PLATELET 292 09/92/2425 12:11 PM    MCV 92.2 01/18/2023 12:11 PM     Lab Results   Component Value Date/Time    Sodium 136 01/19/2023 02:35 AM    Potassium 5.9 (H) 01/19/2023 02:35 AM    Chloride 105 01/19/2023 02:35 AM    CO2 22 01/19/2023 02:35 AM    Anion gap 9 01/19/2023 02:35 AM    Glucose 165 (H) 01/19/2023 02:35 AM    BUN 69 (H) 01/19/2023 02:35 AM    Creatinine 2.81 (H) 01/19/2023 02:35 AM    BUN/Creatinine ratio 25 (H) 01/19/2023 02:35 AM    GFR est AA 54 (L) 09/22/2022 04:20 AM    GFR est non-AA 44 (L) 09/22/2022 04:20 AM    Calcium 9.3 01/19/2023 02:35 AM          DIET:  DIET NPO     PRIMARY NURSE REPORT:   Pre Dialysis: N. Cesario Riedel, RN    Time: 9062    EDUCATION:    [x] Patient           Knowledge Basis: []None []Minimal [x] Substantial [] Unknown  Barriers to learning  [x]None  [] Intubated/Trached/Ventilated  [] Sedated/Paralyzed   [x] Access Care     [] S&S of infection  [] Fluid Management  [x] K+   [x] Procedural    [] Medications   [] Tx Options   [] Transplant   [] Diet      Teaching Tools:  [x] Explain  [] Demo  [] Handouts [] Video  Patient response: [x] Verbalized understanding   [] Requires follow up        [x] Time Out/Safety Check    [x] Extracorporeal Circuit Tested for integrity       RO/HEMODIALYSIS MACHINE SAFETY CHECKS - Before each treatment:        [address] [place] [address]                                     [x] Unit Machine # 7 with centralized RO                                  [] Portable Machine #1/RO serial # [id number]                                  [] Portable Machine #2/RO serial # [id number]                                  [] Portable Machine #4/RO serial # [id number]                                  [] Portable Machine #3/RO serial # [id number]                                                                                                       Alarm Test:  Pass time [id number]         [x] RO/Machine Log Complete    Machine Temp    36-37*C             Dialysate: pH  7.4    Conductivity: Meter 14.0    HD Machine  14    TCD: 13.8  Dialyzer Lot # [id number]    Blood Tubing Lot # [id number]    Saline Lot #[id number]     CHLORINE TESTING-Before each treatment and every 4 hours    Total Chlorine: [x] less than 0.1 ppm  Initial Time Check: 0800     4 Hr/2nd Check Time: 1200   (if greater than 0.1 ppm from Primary then every 30 minutes from Secondary)     TREATMENT INITIATION - with Dialysis Precautions:   [x] All Connections Secured              [x] Saline Line Double Clamped   [x] Venous Parameters Set               [x] Arterial Parameters Set    [x] Prime Given 250ml NSS              [x]Air Foam Detector Engaged                   Medication    Dose Volume Route      Time       [facility] Nurse      HD  Miryam Sandy,  RN      HD         HD                  Post Assessment  Dialyzer Cleared:   [] Good  [] Fair  [x] Poor  Blood processed:  50.1 L  UF Removed:  9751 mL    Post BP: 127/75  Pulse: 102  Respirations: 20   Temp: 97  [x] Temporal  [] Ax  [] Esophageal   Lungs: [] Clear                [x] No change from initial assessment   Post Tx Vascular Access: [x] N/A     Cardiac:  [] Regular   [x] Irregular   Rhythm:  [] Monitored   [] Not Monitored    CVC Catheter: [] N/A  Locking solution: Heparin 1:1000 U  Arterial port 1.9 ml   Venous port 1.9 ml   Edema:  [] None  [x] Generalized                     Skin:[x] Warm  [x] Dry [] Diaphoretic               [] Flushed  [] Pale [] Cyanotic Pain:  []0  [x]1-2  []3-4  []5-6   []7-8  []9-10           Post Treatment Note:   See above note             POST TREATMENT PRIMARY NURSE HANDOFF REPORT:   Post Dialysis: JUSTINE Maldonado RN        Time:  1304         Abbreviations: AVG-arterial venous graft, AVF-arterial venous fistula, IJ-Internal Jugular, Subcl-Subclavian, Fem-Femoral, Tx-treatment, AP/HR-apical heart rate, VSS- Vital Signs Stable, CVC- Central Venous Catheter, DFR-dialysate flow rate, BFR-blood flow rate, AP-arterial pressure, -venous pressure, UF-ultrafiltrate, TMP-transmembrane pressure, Evelio-Venous, Art-Arterial, RO-Reverse Osmosis

## 2023-01-19 NOTE — PROGRESS NOTES
Patient seen and examined. She is doing relatively well. Surprisingly she did walk yesterday. However today she stated that she is in pain. She said that the pain is at the incision site. Her vitals shows no fever or tachycardia. She is hypertensive. Her abdomen is soft with some incisional tenderness. I removed the midline dressing and the wound is clean. Her colostomy bag has clotted blood but no active bleeding. Plan:  Appreciate medicine management  Continue with the IV antibiotics because it was perforated diverticulitis  Adjust her pain medication. I would like to add Toradol and knowing that the patient on dialysis I will wait for the medicine team to make sure it is okay with that because her creatinine is not extremely high. For now she is on Dilaudid 1 mg every 3 hours. I added p.o. Percocet 1 tablet every 4 hours. I adjusted her diet to have ice chips and water and some limited clear liquid. Otherwise keep n.p.o. for now just to make sure she does not develop a very bad ileus. If she does well her diet can be advanced slowly  I placed an order for wound care consultation for ostomy care  Very close observation because she is at very high risk for surgical site infection including intra-abdominal abscesses and superficial wound infection because she had perforated diverticulitis. Also close observation because she had some bleeding from the ostomy but it seems to be doing well now and it seems only to be old clot. But once the patient goes to dialysis this may bleed again. My partner Dr. Shala Jones will be seeing the patient today and over the weekend.

## 2023-01-19 NOTE — ROUTINE PROCESS
Patient is alert and oriented times three with no signs or symptoms of distress.  Every thing remains in tact

## 2023-01-19 NOTE — CONSULTS
Infectious Disease Consultation Note        Reason: Perforated diverticulitis    Current abx Prior abx   Zosyn since 1/18 Azithromycin 1/13-1/18     Lines:       Assessment :  81 y.o. female with a PMHx of ESRD on HD, COPD not on home O2, CAD, COVID (7/2022), DM, HTN, HFpEF who presented to the ED on 1/13/2023 with c/o severe SOB  associated with wheezing. Clinical presentation consistent with acute hypoxic respiratory failure-present on admission due to COPD exacerbation superimposed on chronic systolic/diastolic heart failure;   Perforated sigmoid diverticulitis    Status post expiratory laparotomy, sigmoid colectomy and descending colostomy on 1/18/2023. Intraoperative findings noted  No intraoperative culture sent    Recommendations:    Continue piperacillin/tazobactam  2. Send cultures from abdominal drain to guide antibiotic therapy in future  3. Follow-up surgery recommendations regarding drain removal  4. Monitor CBC, temperature, about test results to determine duration/type of antibiotic therapy      Thank you for consultation request. Above plan was discussed in details with patient, RN, primary team.  Please call me if any further questions or concerns. Will continue to participate in the care of this patient. HPI:    81 y.o. female with a PMHx of ESRD on HD, COPD not on home O2, CAD, COVID (7/2022), DM, HTN, HFpEF who presented to the ED on 1/13/2023 with c/o severe SOB  associated with wheezing. No improvement with at home breathing treatments. She was initiated on treatment for COPD exacerbation. Initiated on antibiotics, Mucinex. In the ED, BP initially elevated, but dropped after SL nitro. While in the hospital patient complained of abdominal pain. CT scan abdomen/pelvis revealed pneumoperitoneum and multiple foci of free air in the left lower quadrant. There was also evidence of diverticulitis of the descending colon/sigmoid colon.   Status post expiratory laparotomy, sigmoid colectomy and descending colostomy on 2023. I have been consulted for further recommendations. Patient thought that she  yesterday and is anxious that her  thinks its his fault. Crying episodes  Denies nausea, vomiting, chest pain. Past Medical History:   Diagnosis Date    Abnormal WBC count 2016    Anemia     Bilateral shoulder pain 2016    Chondromalacia of both patellae     Chronic heart failure with preserved ejection fraction ([facility] 2023    COPD (chronic obstructive pulmonary disease) ([facility] 2015    mild-mod dz; Dr Arturo Chiang    Diabetes [facility])     Diabetic eye exam ([facility]     Dilated cardiomyopathy ([facility]     Dyslipidemia     Gout     Heart attack ([facility] 10/18/2019    Stented    History of echocardiogram 2014    Mild LVE. EF 40%. Mild, diffuse hypk. Mild LAE. Mild MR. Hypercholesteremia 2014    Hypertension     Noncompliance with medications 2016    Obesity     Orthostatic hypotension 2016    Osteoarthritis of both knees     Pain management 2016    Dr. Nicky Terrell     Popliteal cyst, bilateral      Sleep apnea     not using cpap    Stage 3b chronic kidney disease ([facility] 2022    Vitamin D deficiency 10/16/2014       Past Surgical History:   Procedure Laterality Date    HX HEART CATHETERIZATION  2019    Coronary stent    HX TUBAL LIGATION      IR INSERT TUNL CVC W/O PORT OVER 5 YR  2022       Current Discharge Medication List        CONTINUE these medications which have NOT CHANGED    Details   albuterol (PROVENTIL HFA, VENTOLIN HFA, PROAIR HFA) 90 mcg/actuation inhaler       atorvastatin (LIPITOR) 80 mg tablet Take 80 mg by mouth nightly. OneTouch Ultra Test strip USE TO TEST BLOOD SUGAR TWICE DAILY      ezetimibe (ZETIA) 10 mg tablet Take 10 mg by mouth daily. hydrALAZINE (APRESOLINE) 100 mg tablet Take 100 mg by mouth two (2) times a day.       tiZANidine (ZANAFLEX) 4 mg tablet Take 4 mg by mouth two (2) times daily as needed. metoprolol tartrate (LOPRESSOR) 25 mg tablet Take 1 Tablet by mouth two (2) times a day. Qty: 60 Tablet, Refills: 0      insulin detemir U-100 (Levemir U-100 Insulin) 100 unit/mL injection 15 units daily for diabetes once daily with lunch  Qty: 2 mL, Refills: 0    Associated Diagnoses: Diabetes mellitus type 2, insulin dependent (HCC)      gabapentin (NEURONTIN) 400 mg capsule Take 400 mg by mouth in the morning. albuterol-ipratropium (DUO-NEB) 2.5 mg-0.5 mg/3 ml nebu 3 mL by Nebulization route every six (6) hours as needed for Wheezing. Qty: 30 Nebule, Refills: 0      aspirin 81 mg chewable tablet Take 2 Tabs by mouth daily. Qty: 60 Tab, Refills: 0    Associated Diagnoses: Coronary artery disease involving native coronary artery of native heart without angina pectoris      umeclidinium-vilanterol (ANORO ELLIPTA) 62.5-25 mcg/actuation inhaler Take 1 Puff by inhalation daily. For COPD  Qty: 3 Inhaler, Refills: 3    Comments: 9/22/15 Dr Dahiana Pruitt prescribed.  Will order via [facility] if available  Associated Diagnoses: Chronic obstructive pulmonary disease, unspecified [facility] type [facility]             Current Facility-Administered Medications   Medication Dose Route Frequency    oxyCODONE-acetaminophen (PERCOCET) 5-325 mg per tablet 1 Tablet  1 Tablet Oral Q4H PRN    traMADoL (ULTRAM) tablet 50 mg  50 mg Oral Q6H PRN    LORazepam (ATIVAN) injection 0.5 mg  0.5 mg IntraVENous ONCE    pantoprazole (PROTONIX) injection 40 mg  40 mg IntraVENous Q12H    0.9% sodium chloride infusion  50 mL/hr IntraVENous CONTINUOUS    piperacillin-tazobactam (ZOSYN) 3.375 g in 0.9% sodium chloride (MBP/ADV) 100 mL MBP  3.375 g IntraVENous Q12H    hydrocortisone Sod Succ (PF) (SOLU-CORTEF) injection 100 mg  100 mg IntraVENous Q6H    HYDROmorphone (DILAUDID) injection 1 mg  1 mg IntraVENous Q3H PRN    sodium chloride (OCEAN) 0.65 % nasal squeeze bottle 2 Spray  2 Spray Both Nostrils Q2H PRN    ipratropium (ATROVENT) 0.02 % nebulizer solution 0.5 mg  0.5 mg Nebulization Q6H RT    [Held by provider] melatonin tablet 5 mg  5 mg Oral QHS    [Held by provider] heparin (porcine) injection 5,000 Units  5,000 Units SubCUTAneous Q8H    epoetin tonny-epbx (RETACRIT) injection 4,000 Units  4,000 Units SubCUTAneous Q TUE, THU & SAT    [Held by provider] aspirin chewable tablet 162 mg  162 mg Oral DAILY    [Held by provider] atorvastatin (LIPITOR) tablet 80 mg  80 mg Oral QHS    [Held by provider] ezetimibe (ZETIA) tablet 10 mg  10 mg Oral DAILY    [Held by provider] gabapentin (NEURONTIN) capsule 400 mg  400 mg Oral DAILY    sodium chloride (NS) flush 5-40 mL  5-40 mL IntraVENous Q8H    sodium chloride (NS) flush 5-40 mL  5-40 mL IntraVENous PRN    acetaminophen (TYLENOL) tablet 650 mg  650 mg Oral Q6H PRN    Or    acetaminophen (TYLENOL) suppository 650 mg  650 mg Rectal Q6H PRN    polyethylene glycol (MIRALAX) packet 17 g  17 g Oral DAILY PRN    bisacodyL (DULCOLAX) suppository 10 mg  10 mg Rectal DAILY PRN    ondansetron (ZOFRAN ODT) tablet 4 mg  4 mg Oral Q8H PRN    Or    ondansetron (ZOFRAN) injection 4 mg  4 mg IntraVENous Q6H PRN    arformoteroL (BROVANA) neb solution 15 mcg  15 mcg Nebulization BID RT    budesonide (PULMICORT) 500 mcg/2 ml nebulizer suspension  500 mcg Nebulization BID RT    [Held by provider] metoprolol tartrate (LOPRESSOR) tablet 25 mg  25 mg Oral BID    insulin lispro (HUMALOG) injection   SubCUTAneous AC&HS    glucose chewable tablet 16 g  4 Tablet Oral PRN    glucagon (GLUCAGEN) injection 1 mg  1 mg IntraMUSCular PRN    dextrose 10% infusion 0-250 mL  0-250 mL IntraVENous PRN    [Held by provider] insulin glargine (LANTUS) injection 5 Units  5 Units SubCUTAneous QHS    [Held by provider] guaiFENesin ER (MUCINEX) tablet 600 mg  600 mg Oral Q12H       Allergies: Patient has no known allergies.     Family History   Problem Relation Age of Onset    Diabetes Mother     Hypertension Mother     Hypertension Father Kidney Disease Maternal Aunt 48        Dialysis     Social History     Socioeconomic History    Marital status:      Spouse name: Not on file    Number of children: 3    Years of education: Not on file    Highest education level: Not on file   Occupational History    Occupation: retired   Tobacco Use    Smoking status: Former     Packs/day: 1.00     Years: 52.00     Pack years: 52.00     Types: Cigarettes     Quit date: 2019     Years since quitting: 3.6    Smokeless tobacco: Never   Vaping Use    Vaping Use: Never used   Substance and Sexual Activity    Alcohol use: No     Alcohol/week: 0.0 standard drinks    Drug use: Never    Sexual activity: Yes     Partners: Male   Other Topics Concern     Service No    Blood Transfusions No    Caffeine Concern No    Occupational Exposure No    Hobby Hazards No    Sleep Concern No    Stress Concern No    Weight Concern No    Special Diet No    Back Care No    Exercise No    Bike Helmet No    Seat Belt Yes    Self-Exams Yes   Social History Narrative    Not on file     Social Determinants of Health     Financial Resource Strain: Not on file   Food Insecurity: Not on file   Transportation Needs: Not on file   Physical Activity: Not on file   Stress: Not on file   Social Connections: Not on file   Intimate Partner Violence: Not on file   Housing Stability: Not on file     Social History     Tobacco Use   Smoking Status Former    Packs/day: 1.00    Years: 52.00    Pack years: 52.00    Types: Cigarettes    Quit date: 2019    Years since quitting: 3.6   Smokeless Tobacco Never        Temp (24hrs), Av.1 °F (36.7 °C), Min:97.2 °F (36.2 °C), Max:99.1 °F (37.3 °C)    Visit Vitals  BP (!) 201/77   Pulse (!) 125   Temp 99.1 °F (37.3 °C) (Temporal)   Resp 18   Ht 5' 4\" (1.626 m)   Wt 97.2 kg (214 lb 4.8 oz)   SpO2 97%   Breastfeeding Unknown   BMI 36.78 kg/m²       ROS: 12 point ROS obtained in details.  Pertinent positives as mentioned in HPI,   otherwise negative    Physical Exam:       General appearance - alert, well appearing, and in no distress, oriented to person, place, and time and overweight  Mental status - alert, oriented to person, place, and time  Eyes - pupils equal and reactive, extraocular eye movements intact  Ears - bilateral TM's and external ear canals normal  Nose - normal and patent, no erythema, discharge or polyps  Mouth - mucous membranes moist, pharynx normal without lesions  Neck - supple, no significant adenopathy  Chest - clear to auscultation, no wheezes, rales or rhonchi, symmetric air entry  Heart - normal rate, regular rhythm, normal S1, S2, no rubs, clicks or gallops  Abdomen - soft, tenderness around the surgical site, colostomy in place  Breasts -rash underneath both breast  Neurological - alert, oriented, normal speech, no focal findings or movement disorder noted  Musculoskeletal - no joint tenderness, deformity or swelling  Extremities - peripheral pulses normal, no pedal edema, no clubbing or cyanosis  Skin - rash on extremities, trunk almost completely resolved     Labs: Results:   Chemistry Recent Labs     01/19/23  0235 01/18/23  1211 01/18/23  0341 01/17/23  1313   * 180* 103*  --     135* 138  --    K 5.9* 5.3 4.8  --     104 106  --    CO2 22 25 27  --    BUN 69* 53* 49*  --    CREA 2.81* 1.91* 1.89*  --    CA 9.3 9.4 9.1  --    AGAP 9 6 5  --    BUCR 25* 28* 26*  --    AP  --  120*  --  141*   TP  --  6.8  --  7.2   ALB 3.1* 3.4 3.0* 3.6   GLOB  --  3.4  --  3.6   AGRAT  --  1.0  --  1.0      CBC w/Diff Recent Labs     01/18/23  1211 01/17/23  1313   WBC 12.9 11.5   RBC 3.97* 4.41   HGB 11.2* 12.3   HCT 36.6 39.7    345   GRANS 91* 86*   LYMPH 5* 6*   EOS 0 0      Microbiology No results for input(s): CULT in the last 72 hours.        RADIOLOGY:    All available imaging studies/reports in [facility] for this admission were reviewed      Disclaimer: Sections of this note are dictated utilizing voice recognition software, which may have resulted in some phonetic based errors in grammar and contents. Even though attempts were made to correct all the mistakes, some may have been missed, and remained in the body of the document. If questions arise, please contact our department.     Dr. Danny Segovia, Infectious Disease Specialist  [phone]  January 19, 2023  11:52 AM

## 2023-01-19 NOTE — PERIOP NOTES
LLQ colostomy bag reinforced as per Dr. Brian Evans. Seepage of blood  from colostomy bag wiped  clean from perineum inner upper thigh and lower abdomen using normal saline.

## 2023-01-19 NOTE — PERIOP NOTES
Dr. Luis Canas informed of LLQ colostomy bag leaking sanguneious drainage. Soft pasty stool note in colostomy bag.

## 2023-01-20 LAB
ALBUMIN SERPL-MCNC: 2.7 G/DL (ref 3.4–5)
ANION GAP SERPL CALC-SCNC: 10 MMOL/L (ref 3–18)
BASOPHILS # BLD: 0 K/UL (ref 0–0.1)
BASOPHILS NFR BLD: 0 % (ref 0–2)
BUN SERPL-MCNC: 63 MG/DL (ref 7–18)
BUN/CREAT SERPL: 17 (ref 12–20)
CALCIUM SERPL-MCNC: 9.4 MG/DL (ref 8.5–10.1)
CHLORIDE SERPL-SCNC: 105 MMOL/L (ref 100–111)
CO2 SERPL-SCNC: 24 MMOL/L (ref 21–32)
CREAT SERPL-MCNC: 3.7 MG/DL (ref 0.6–1.3)
DIFFERENTIAL METHOD BLD: ABNORMAL
EOSINOPHIL # BLD: 0 K/UL (ref 0–0.4)
EOSINOPHIL NFR BLD: 0 % (ref 0–5)
ERYTHROCYTE [DISTWIDTH] IN BLOOD BY AUTOMATED COUNT: 18.5 % (ref 11.6–14.5)
GLUCOSE BLD STRIP.AUTO-MCNC: 162 MG/DL (ref 70–110)
GLUCOSE BLD STRIP.AUTO-MCNC: 162 MG/DL (ref 70–110)
GLUCOSE BLD STRIP.AUTO-MCNC: 167 MG/DL (ref 70–110)
GLUCOSE BLD STRIP.AUTO-MCNC: 172 MG/DL (ref 70–110)
GLUCOSE BLD STRIP.AUTO-MCNC: 178 MG/DL (ref 70–110)
GLUCOSE SERPL-MCNC: 164 MG/DL (ref 74–99)
HCT VFR BLD AUTO: 30.4 % (ref 35–45)
HGB BLD-MCNC: 9.2 G/DL (ref 12–16)
IMM GRANULOCYTES # BLD AUTO: 0.2 K/UL (ref 0–0.04)
IMM GRANULOCYTES NFR BLD AUTO: 2 % (ref 0–0.5)
LYMPHOCYTES # BLD: 0.6 K/UL (ref 0.9–3.6)
LYMPHOCYTES NFR BLD: 6 % (ref 21–52)
MCH RBC QN AUTO: 28.1 PG (ref 24–34)
MCHC RBC AUTO-ENTMCNC: 30.3 G/DL (ref 31–37)
MCV RBC AUTO: 93 FL (ref 78–100)
MONOCYTES # BLD: 0.9 K/UL (ref 0.05–1.2)
MONOCYTES NFR BLD: 10 % (ref 3–10)
NEUTS SEG # BLD: 7.9 K/UL (ref 1.8–8)
NEUTS SEG NFR BLD: 83 % (ref 40–73)
NRBC # BLD: 0.21 K/UL (ref 0–0.01)
NRBC BLD-RTO: 2.2 PER 100 WBC
PHOSPHATE SERPL-MCNC: 7.3 MG/DL (ref 2.5–4.9)
PLATELET # BLD AUTO: 343 K/UL (ref 135–420)
PMV BLD AUTO: 10.4 FL (ref 9.2–11.8)
POTASSIUM SERPL-SCNC: 5.2 MMOL/L (ref 3.5–5.5)
RBC # BLD AUTO: 3.27 M/UL (ref 4.2–5.3)
SODIUM SERPL-SCNC: 139 MMOL/L (ref 136–145)
WBC # BLD AUTO: 9.6 K/UL (ref 4.6–13.2)

## 2023-01-20 PROCEDURE — 80069 RENAL FUNCTION PANEL: CPT

## 2023-01-20 PROCEDURE — 85025 COMPLETE CBC W/AUTO DIFF WBC: CPT

## 2023-01-20 PROCEDURE — 74011250637 HC RX REV CODE- 250/637: Performed by: PHYSICIAN ASSISTANT

## 2023-01-20 PROCEDURE — 77030010520

## 2023-01-20 PROCEDURE — 77030013076 HC PCH OST BAG COLO -A

## 2023-01-20 PROCEDURE — 77030040162

## 2023-01-20 PROCEDURE — 36415 COLL VENOUS BLD VENIPUNCTURE: CPT

## 2023-01-20 PROCEDURE — 74011250636 HC RX REV CODE- 250/636: Performed by: PHYSICIAN ASSISTANT

## 2023-01-20 PROCEDURE — 74011250636 HC RX REV CODE- 250/636: Performed by: INTERNAL MEDICINE

## 2023-01-20 PROCEDURE — 74011250637 HC RX REV CODE- 250/637: Performed by: SURGERY

## 2023-01-20 PROCEDURE — 74011000250 HC RX REV CODE- 250: Performed by: SURGERY

## 2023-01-20 PROCEDURE — 74011250637 HC RX REV CODE- 250/637: Performed by: HOSPITALIST

## 2023-01-20 PROCEDURE — 97535 SELF CARE MNGMENT TRAINING: CPT

## 2023-01-20 PROCEDURE — 74011636637 HC RX REV CODE- 636/637: Performed by: SURGERY

## 2023-01-20 PROCEDURE — 77030015696

## 2023-01-20 PROCEDURE — 65270000029 HC RM PRIVATE

## 2023-01-20 PROCEDURE — 74011250637 HC RX REV CODE- 250/637: Performed by: INTERNAL MEDICINE

## 2023-01-20 PROCEDURE — 2709999900 HC NON-CHARGEABLE SUPPLY

## 2023-01-20 PROCEDURE — 74011000258 HC RX REV CODE- 258: Performed by: SURGERY

## 2023-01-20 PROCEDURE — C9113 INJ PANTOPRAZOLE SODIUM, VIA: HCPCS | Performed by: EMERGENCY MEDICINE

## 2023-01-20 PROCEDURE — 74011250636 HC RX REV CODE- 250/636: Performed by: SURGERY

## 2023-01-20 PROCEDURE — 74011000250 HC RX REV CODE- 250: Performed by: EMERGENCY MEDICINE

## 2023-01-20 PROCEDURE — 74011250636 HC RX REV CODE- 250/636: Performed by: EMERGENCY MEDICINE

## 2023-01-20 PROCEDURE — 82962 GLUCOSE BLOOD TEST: CPT

## 2023-01-20 PROCEDURE — 99232 SBSQ HOSP IP/OBS MODERATE 35: CPT | Performed by: EMERGENCY MEDICINE

## 2023-01-20 RX ORDER — ALBUTEROL SULFATE 1.25 MG/3ML
1.25 SOLUTION RESPIRATORY (INHALATION)
Status: DISCONTINUED | OUTPATIENT
Start: 2023-01-20 | End: 2023-01-25 | Stop reason: HOSPADM

## 2023-01-20 RX ORDER — HYDROCORTISONE SODIUM SUCCINATE 100 MG/2ML
100 INJECTION, POWDER, FOR SOLUTION INTRAMUSCULAR; INTRAVENOUS EVERY 12 HOURS
Status: DISCONTINUED | OUTPATIENT
Start: 2023-01-20 | End: 2023-01-22

## 2023-01-20 RX ORDER — IPRATROPIUM BROMIDE 0.5 MG/2.5ML
0.5 SOLUTION RESPIRATORY (INHALATION)
Status: DISCONTINUED | OUTPATIENT
Start: 2023-01-20 | End: 2023-01-20

## 2023-01-20 RX ADMIN — SODIUM CHLORIDE 50 ML/HR: 9 INJECTION, SOLUTION INTRAVENOUS at 18:44

## 2023-01-20 RX ADMIN — HYDROMORPHONE HYDROCHLORIDE 1 MG: 1 INJECTION, SOLUTION INTRAMUSCULAR; INTRAVENOUS; SUBCUTANEOUS at 14:12

## 2023-01-20 RX ADMIN — TIOTROPIUM BROMIDE AND OLODATEROL 2 PUFF: 3.124; 2.736 SPRAY, METERED RESPIRATORY (INHALATION) at 14:13

## 2023-01-20 RX ADMIN — ATORVASTATIN CALCIUM 80 MG: 40 TABLET, FILM COATED ORAL at 21:49

## 2023-01-20 RX ADMIN — PIPERACILLIN AND TAZOBACTAM 3.38 G: 3; .375 INJECTION, POWDER, FOR SOLUTION INTRAVENOUS at 10:43

## 2023-01-20 RX ADMIN — SODIUM CHLORIDE, PRESERVATIVE FREE 10 ML: 5 INJECTION INTRAVENOUS at 21:49

## 2023-01-20 RX ADMIN — ACETAMINOPHEN 650 MG: 325 TABLET, FILM COATED ORAL at 18:29

## 2023-01-20 RX ADMIN — SODIUM CHLORIDE, PRESERVATIVE FREE 10 ML: 5 INJECTION INTRAVENOUS at 13:47

## 2023-01-20 RX ADMIN — PIPERACILLIN AND TAZOBACTAM 3.38 G: 3; .375 INJECTION, POWDER, FOR SOLUTION INTRAVENOUS at 21:49

## 2023-01-20 RX ADMIN — HYDROMORPHONE HYDROCHLORIDE 1 MG: 1 INJECTION, SOLUTION INTRAMUSCULAR; INTRAVENOUS; SUBCUTANEOUS at 09:59

## 2023-01-20 RX ADMIN — METOPROLOL TARTRATE 2.5 MG: 5 INJECTION INTRAVENOUS at 06:20

## 2023-01-20 RX ADMIN — GUAIFENESIN 600 MG: 600 TABLET, EXTENDED RELEASE ORAL at 21:49

## 2023-01-20 RX ADMIN — Medication 3 UNITS: at 16:31

## 2023-01-20 RX ADMIN — HYDROCORTISONE SODIUM SUCCINATE 100 MG: 100 INJECTION, POWDER, FOR SOLUTION INTRAMUSCULAR; INTRAVENOUS at 06:19

## 2023-01-20 RX ADMIN — Medication 3 UNITS: at 22:14

## 2023-01-20 RX ADMIN — METOPROLOL TARTRATE 2.5 MG: 5 INJECTION INTRAVENOUS at 13:52

## 2023-01-20 RX ADMIN — METOPROLOL TARTRATE 25 MG: 25 TABLET, FILM COATED ORAL at 18:32

## 2023-01-20 RX ADMIN — HYDROMORPHONE HYDROCHLORIDE 1 MG: 1 INJECTION, SOLUTION INTRAMUSCULAR; INTRAVENOUS; SUBCUTANEOUS at 06:33

## 2023-01-20 RX ADMIN — PANTOPRAZOLE SODIUM 40 MG: 40 INJECTION, POWDER, FOR SOLUTION INTRAVENOUS at 21:49

## 2023-01-20 RX ADMIN — HYDROCORTISONE SODIUM SUCCINATE 100 MG: 100 INJECTION, POWDER, FOR SOLUTION INTRAMUSCULAR; INTRAVENOUS at 18:40

## 2023-01-20 RX ADMIN — PANTOPRAZOLE SODIUM 40 MG: 40 INJECTION, POWDER, FOR SOLUTION INTRAVENOUS at 09:00

## 2023-01-20 RX ADMIN — Medication 3 UNITS: at 14:02

## 2023-01-20 RX ADMIN — HEPARIN SODIUM 5000 UNITS: 5000 INJECTION INTRAVENOUS; SUBCUTANEOUS at 18:32

## 2023-01-20 NOTE — PROGRESS NOTES
Called Dr. Jerry Cueva and asked regarding diet status as patient would like to resume eating. There has been some output into the colostomy bag however it is a small amount. It is fragrant so there may be gas as well. Per  She will input a clear liquids diet.

## 2023-01-20 NOTE — ROUTINE PROCESS
Bedside shift change report given to Palmira Escamilla RN (oncoming nurse) by Indra Carcamo RN (offgoing nurse). Report included the following information SBAR, Kardex, Intake/Output, and Recent Results.

## 2023-01-20 NOTE — PROGRESS NOTES
Infectious Disease progress Note        Reason: Perforated diverticulitis    Current abx Prior abx   Zosyn since 1/18 Azithromycin 1/13-1/18     Lines:       Assessment :  81 y.o. female with a PMHx of ESRD on HD, COPD not on home O2, CAD, COVID (7/2022), DM, HTN, HFpEF who presented to the ED on 1/13/2023 with c/o severe SOB  associated with wheezing. Clinical presentation consistent with acute hypoxic respiratory failure-present on admission due to COPD exacerbation superimposed on chronic systolic/diastolic heart failure;   Perforated sigmoid diverticulitis    Status post exploratory laparotomy, sigmoid colectomy and descending colostomy on 1/18/2023. Intraoperative findings noted  No intraoperative culture sent    Persistent abdominal pain noted today-likely due to postoperative inflammation. Monitor for new infection/sepsis    Recommendations:    Continue piperacillin/tazobactam  2. Send cultures from abdominal drain to guide antibiotic therapy in future  3. Follow-up surgery recommendations regarding drain removal  4. Monitor CBC, temperature, about test results to determine duration/type of antibiotic therapy      Above plan was discussed in details with patient, RN, primary team.  Please call me if any further questions or concerns. Will continue to participate in the care of this patient. HPI:    C/o abdominal pain. Denies nausea, vomiting, chest pain. Past Medical History:   Diagnosis Date    Abnormal WBC count 05/17/2016    Anemia     Bilateral shoulder pain 09/27/2016    Chondromalacia of both patellae     Chronic heart failure with preserved ejection fraction ([address] 1/13/2023    COPD (chronic obstructive pulmonary disease) ([address] 09/2015    mild-mod dz; Dr Theadore Dubin    Diabetes [facility]) 2013    Diabetic eye exam ([address] 2016    Dilated cardiomyopathy ([address]     Dyslipidemia     Gout     Heart attack ([address] 10/18/2019    Stented    History of echocardiogram 11/14/2014    Mild LVE. EF 40%.   Mild, diffuse hypk. Mild LAE. Mild MR. Hypercholesteremia 01/08/2014    Hypertension 2000    Noncompliance with medications 02/16/2016    Obesity     Orthostatic hypotension 05/17/2016    Osteoarthritis of both knees     Pain management 04/01/2016    Dr. Aissatou Yang     Popliteal cyst, bilateral      Sleep apnea     not using cpap    Stage 3b chronic kidney disease ([facility] 08/08/2022    Vitamin D deficiency 10/16/2014       Past Surgical History:   Procedure Laterality Date    HX HEART CATHETERIZATION  2019    Coronary stent    HX TUBAL LIGATION      IR INSERT TUNL CVC W/O PORT OVER 5 YR  09/21/2022       Current Discharge Medication List        CONTINUE these medications which have NOT CHANGED    Details   albuterol (PROVENTIL HFA, VENTOLIN HFA, PROAIR HFA) 90 mcg/actuation inhaler       atorvastatin (LIPITOR) 80 mg tablet Take 80 mg by mouth nightly. OneTouch Ultra Test strip USE TO TEST BLOOD SUGAR TWICE DAILY      ezetimibe (ZETIA) 10 mg tablet Take 10 mg by mouth daily. hydrALAZINE (APRESOLINE) 100 mg tablet Take 100 mg by mouth two (2) times a day. tiZANidine (ZANAFLEX) 4 mg tablet Take 4 mg by mouth two (2) times daily as needed. metoprolol tartrate (LOPRESSOR) 25 mg tablet Take 1 Tablet by mouth two (2) times a day. Qty: 60 Tablet, Refills: 0      insulin detemir U-100 (Levemir U-100 Insulin) 100 unit/mL injection 15 units daily for diabetes once daily with lunch  Qty: 2 mL, Refills: 0    Associated Diagnoses: Diabetes mellitus type 2, insulin dependent (HCC)      gabapentin (NEURONTIN) 400 mg capsule Take 400 mg by mouth in the morning. albuterol-ipratropium (DUO-NEB) 2.5 mg-0.5 mg/3 ml nebu 3 mL by Nebulization route every six (6) hours as needed for Wheezing. Qty: 30 Nebule, Refills: 0      aspirin 81 mg chewable tablet Take 2 Tabs by mouth daily.   Qty: 60 Tab, Refills: 0    Associated Diagnoses: Coronary artery disease involving native coronary artery of native heart without angina pectoris      umeclidinium-vilanterol (ANORO ELLIPTA) 62.5-25 mcg/actuation inhaler Take 1 Puff by inhalation daily. For COPD  Qty: 3 Inhaler, Refills: 3    Comments: 9/22/15 Dr Edna Adams prescribed.  Will order via [facility] if available  Associated Diagnoses: Chronic obstructive pulmonary disease, unspecified COPD type ([facility] [address]             Current Facility-Administered Medications   Medication Dose Route Frequency    ipratropium (ATROVENT) 0.02 % nebulizer solution 0.5 mg  0.5 mg Nebulization BID RT    oxyCODONE-acetaminophen (PERCOCET) 5-325 mg per tablet 1 Tablet  1 Tablet Oral Q4H PRN    traMADoL (ULTRAM) tablet 50 mg  50 mg Oral Q6H PRN    [START ON 1/21/2023] epoetin tonny-epbx (RETACRIT) injection 4,000 Units  4,000 Units SubCUTAneous Q TUE, THU & SAT    metoprolol (LOPRESSOR) injection 2.5 mg  2.5 mg IntraVENous Q6H    naloxone (NARCAN) injection 0.4 mg  0.4 mg IntraVENous PRN    pantoprazole (PROTONIX) 40 mg in 0.9% sodium chloride 10 mL injection  40 mg IntraVENous Q12H    0.9% sodium chloride infusion  50 mL/hr IntraVENous CONTINUOUS    piperacillin-tazobactam (ZOSYN) 3.375 g in 0.9% sodium chloride (MBP/ADV) 100 mL MBP  3.375 g IntraVENous Q12H    hydrocortisone Sod Succ (PF) (SOLU-CORTEF) injection 100 mg  100 mg IntraVENous Q6H    HYDROmorphone (DILAUDID) injection 1 mg  1 mg IntraVENous Q3H PRN    sodium chloride (OCEAN) 0.65 % nasal squeeze bottle 2 Spray  2 Spray Both Nostrils Q2H PRN    [Held by provider] melatonin tablet 5 mg  5 mg Oral QHS    [Held by provider] heparin (porcine) injection 5,000 Units  5,000 Units SubCUTAneous Q8H    [Held by provider] aspirin chewable tablet 162 mg  162 mg Oral DAILY    [Held by provider] atorvastatin (LIPITOR) tablet 80 mg  80 mg Oral QHS    [Held by provider] ezetimibe (ZETIA) tablet 10 mg  10 mg Oral DAILY    [Held by provider] gabapentin (NEURONTIN) capsule 400 mg  400 mg Oral DAILY    sodium chloride (NS) flush 5-40 mL  5-40 mL IntraVENous Q8H    sodium chloride (NS) flush 5-40 mL  5-40 mL IntraVENous PRN    acetaminophen (TYLENOL) tablet 650 mg  650 mg Oral Q6H PRN    Or    acetaminophen (TYLENOL) suppository 650 mg  650 mg Rectal Q6H PRN    polyethylene glycol (MIRALAX) packet 17 g  17 g Oral DAILY PRN    bisacodyL (DULCOLAX) suppository 10 mg  10 mg Rectal DAILY PRN    ondansetron (ZOFRAN ODT) tablet 4 mg  4 mg Oral Q8H PRN    Or    ondansetron (ZOFRAN) injection 4 mg  4 mg IntraVENous Q6H PRN    arformoteroL (BROVANA) neb solution 15 mcg  15 mcg Nebulization BID RT    budesonide (PULMICORT) 500 mcg/2 ml nebulizer suspension  500 mcg Nebulization BID RT    [Held by provider] metoprolol tartrate (LOPRESSOR) tablet 25 mg  25 mg Oral BID    insulin lispro (HUMALOG) injection   SubCUTAneous AC&HS    glucose chewable tablet 16 g  4 Tablet Oral PRN    glucagon (GLUCAGEN) injection 1 mg  1 mg IntraMUSCular PRN    dextrose 10% infusion 0-250 mL  0-250 mL IntraVENous PRN    [Held by provider] insulin glargine (LANTUS) injection 5 Units  5 Units SubCUTAneous QHS    [Held by provider] guaiFENesin ER (MUCINEX) tablet 600 mg  600 mg Oral Q12H       Allergies: Patient has no known allergies.     Family History   Problem Relation Age of Onset    Diabetes Mother     Hypertension Mother     Hypertension Father     Kidney Disease Maternal Aunt 48        Dialysis     Social History     Socioeconomic History    Marital status:      Spouse name: Not on file    Number of children: 3    Years of education: Not on file    Highest education level: Not on file   Occupational History    Occupation: retired   Tobacco Use    Smoking status: Former     Packs/day: 1.00     Years: 52.00     Pack years: 52.00     Types: Cigarettes     Quit date: 5/20/2019     Years since quitting: 3.6    Smokeless tobacco: Never   Vaping Use    Vaping Use: Never used   Substance and Sexual Activity    Alcohol use: No     Alcohol/week: 0.0 standard drinks    Drug use: Never    Sexual activity: Yes     Partners: Male   Other Topics Concern     Service No    Blood Transfusions No    Caffeine Concern No    Occupational Exposure No    Hobby Hazards No    Sleep Concern No    Stress Concern No    Weight Concern No    Special Diet No    Back Care No    Exercise No    Bike Helmet No    Seat Belt Yes    Self-Exams Yes   Social History Narrative    Not on file     Social Determinants of Health     Financial Resource Strain: Not on file   Food Insecurity: Not on file   Transportation Needs: Not on file   Physical Activity: Not on file   Stress: Not on file   Social Connections: Not on file   Intimate Partner Violence: Not on file   Housing Stability: Not on file     Social History     Tobacco Use   Smoking Status Former    Packs/day: 1.00    Years: 52.00    Pack years: 52.00    Types: Cigarettes    Quit date: 2019    Years since quitting: 3.6   Smokeless Tobacco Never        Temp (24hrs), Av.1 °F (36.7 °C), Min:97 °F (36.1 °C), Max:99.1 °F (37.3 °C)    Visit Vitals  /71   Pulse 77   Temp 97.4 °F (36.3 °C)   Resp 16   Ht 5' 4\" (1.626 m)   Wt 97.2 kg (214 lb 4.8 oz)   SpO2 99%   Breastfeeding Unknown   BMI 36.78 kg/m²       ROS: 12 point ROS obtained in details.  Pertinent positives as mentioned in HPI,   otherwise negative    Physical Exam:       General appearance - alert, well appearing, and in no distress, oriented to person, place, and time and overweight  Mental status - alert, oriented to person, place, and time  Eyes - pupils equal and reactive, extraocular eye movements intact  Ears - bilateral TM's and external ear canals normal  Nose - normal and patent, no erythema, discharge or polyps  Mouth - mucous membranes moist, pharynx normal without lesions  Neck - supple, no significant adenopathy  Chest - clear to auscultation, no wheezes, rales or rhonchi, symmetric air entry  Heart - normal rate, regular rhythm, normal S1, S2, no rubs, clicks or gallops  Abdomen - soft, tenderness around the surgical site, colostomy in place  Breasts -rash underneath both breast  Neurological - alert, oriented, normal speech, no focal findings or movement disorder noted  Musculoskeletal - no joint tenderness, deformity or swelling  Extremities - peripheral pulses normal, no pedal edema, no clubbing or cyanosis  Skin - rash on extremities, trunk almost completely resolved     Labs: Results:   Chemistry Recent Labs     01/20/23  0610 01/19/23  0235 01/18/23  1211 01/18/23  0341 01/17/23  1313   * 165* 180*   < >  --     136 135*   < >  --    K 5.2 5.9* 5.3   < >  --     105 104   < >  --    CO2 24 22 25   < >  --    BUN 63* 69* 53*   < >  --    CREA 3.70* 2.81* 1.91*   < >  --    CA 9.4 9.3 9.4   < >  --    AGAP 10 9 6   < >  --    BUCR 17 25* 28*   < >  --    AP  --   --  120*  --  141*   TP  --   --  6.8  --  7.2   ALB 2.7* 3.1* 3.4   < > 3.6   GLOB  --   --  3.4  --  3.6   AGRAT  --   --  1.0  --  1.0    < > = values in this interval not displayed. CBC w/Diff Recent Labs     01/20/23  0610 01/18/23  1211 01/17/23  1313   WBC 9.6 12.9 11.5   RBC 3.27* 3.97* 4.41   HGB 9.2* 11.2* 12.3   HCT 30.4* 36.6 39.7    378 345   GRANS 83* 91* 86*   LYMPH 6* 5* 6*   EOS 0 0 0        Microbiology No results for input(s): CULT in the last 72 hours. RADIOLOGY:    All available imaging studies/reports in [facility] care for this admission were reviewed      Disclaimer: Sections of this note are dictated utilizing voice recognition software, which may have resulted in some phonetic based errors in grammar and contents. Even though attempts were made to correct all the mistakes, some may have been missed, and remained in the body of the document. If questions arise, please contact our department.     Dr. Tru Herron, Infectious Disease Specialist  [phone]  January 20, 2023  11:52 AM

## 2023-01-20 NOTE — PROGRESS NOTES
[facility] Hospitalist Group  Progress Note    Patient: Charlie Castillo Age: 81 y.o. : 1950 MR#: [id number] SSN: [id number]  Date/Time: 2023    Subjective:     Patient is sitting in bed in no apparent distress, awake, follows simple commands    Assessment/Plan:    81year old female w/ h/o ESRD, COPD not on home 02, HFpEF admitted to the ED after presenting with c/o severe SOB and wheezing.    -Abdominal pain, pneumoperitoneum, diverticulitis on CT scan    -COPD w/ acute exacerbation: on IV steroid, bronchodilators, on supplemental 02. Has ambulatory hypoxia.     HISTORY OF:  -HTN  -CAD  -HFpEF: last echo in 2022-->EF: 50-55%  -Type 2DM:   -CAD    PLAN:  Status post laparotomy and sigmoid colectomy and colostomy  Diet as per surgery  Antibiotics per ID  Hemodialysis per nephrology  On stress dose steroids  -Cont bronchodilators, 02 support  -PT and OT  Discussed with patient    Dispo: PT is recommending SNF/rehab  Additional Notes:      Case discussed with:  [x]Patient  []Family  []Nursing  []Case Management  DVT Prophylaxis:  []Lovenox  [x]Hep SQ  []SCDs  []Coumadin   []On Heparin gtt    Objective:   VS: Visit Vitals  BP (!) 140/69   Pulse 83   Temp 98 °F (36.7 °C)   Resp 20   Ht 5' 4\" (1.626 m)   Wt 97.2 kg (214 lb 4.8 oz)   SpO2 99%   Breastfeeding Unknown   BMI 36.78 kg/m²      Tmax/24hrs: Temp (24hrs), Av.1 °F (36.7 °C), Min:97.4 °F (36.3 °C), Max:99 °F (37.2 °C)    Input/Output:   Intake/Output Summary (Last 24 hours) at 2023 1718  Last data filed at 2023 1350  Gross per 24 hour   Intake --   Output 400 ml   Net -400 ml         General:  Awake, follows simple commands  Cardiovascular:  S1S2+, RRR  Pulmonary: Coarse breath sounds bilaterally  GI:  Soft, BS+, NT, ND, has colostomy  Extremities:  trace edema        Labs:    Recent Results (from the past 24 hour(s))   GLUCOSE, POC    Collection Time: 23 11:05 PM   Result Value Ref Range    Glucose (POC) 202 (H) 70 - 110 mg/dL   RENAL FUNCTION PANEL    Collection Time: 01/20/23  6:10 AM   Result Value Ref Range    Sodium 139 136 - 145 mmol/L    Potassium 5.2 3.5 - 5.5 mmol/L    Chloride 105 100 - 111 mmol/L    CO2 24 21 - 32 mmol/L    Anion gap 10 3.0 - 18 mmol/L    Glucose 164 (H) 74 - 99 mg/dL    BUN 63 (H) 7.0 - 18 MG/DL    Creatinine 3.70 (H) 0.6 - 1.3 MG/DL    BUN/Creatinine ratio 17 12 - 20      eGFR 13 (L) >60 ml/min/1.73m2    Calcium 9.4 8.5 - 10.1 MG/DL    Phosphorus 7.3 (H) 2.5 - 4.9 MG/DL    Albumin 2.7 (L) 3.4 - 5.0 g/dL   CBC WITH AUTOMATED DIFF    Collection Time: 01/20/23  6:10 AM   Result Value Ref Range    WBC 9.6 4.6 - 13.2 K/uL    RBC 3.27 (L) 4.20 - 5.30 M/uL    HGB 9.2 (L) 12.0 - 16.0 g/dL    HCT 30.4 (L) 35.0 - 45.0 %    MCV 93.0 78.0 - 100.0 FL    MCH 28.1 24.0 - 34.0 PG    MCHC 30.3 (L) 31.0 - 37.0 g/dL    RDW 18.5 (H) 11.6 - 14.5 %    PLATELET 491 303 - 487 K/uL    MPV 10.4 9.2 - 11.8 FL    NRBC 2.2 (H) 0  WBC    ABSOLUTE NRBC 0.21 (H) 0.00 - 0.01 K/uL    NEUTROPHILS 83 (H) 40 - 73 %    LYMPHOCYTES 6 (L) 21 - 52 %    MONOCYTES 10 3 - 10 %    EOSINOPHILS 0 0 - 5 %    BASOPHILS 0 0 - 2 %    IMMATURE GRANULOCYTES 2 (H) 0.0 - 0.5 %    ABS. NEUTROPHILS 7.9 1.8 - 8.0 K/UL    ABS. LYMPHOCYTES 0.6 (L) 0.9 - 3.6 K/UL    ABS. MONOCYTES 0.9 0.05 - 1.2 K/UL    ABS. EOSINOPHILS 0.0 0.0 - 0.4 K/UL    ABS. BASOPHILS 0.0 0.0 - 0.1 K/UL    ABS. IMM.  GRANS. 0.2 (H) 0.00 - 0.04 K/UL    DF AUTOMATED     GLUCOSE, POC    Collection Time: 01/20/23  7:38 AM   Result Value Ref Range    Glucose (POC) 178 (H) 70 - 110 mg/dL   GLUCOSE, POC    Collection Time: 01/20/23 10:24 AM   Result Value Ref Range    Glucose (POC) 162 (H) 70 - 110 mg/dL   GLUCOSE, POC    Collection Time: 01/20/23 11:48 AM   Result Value Ref Range    Glucose (POC) 162 (H) 70 - 110 mg/dL   GLUCOSE, POC    Collection Time: 01/20/23  4:29 PM   Result Value Ref Range    Glucose (POC) 167 (H) 70 - 110 mg/dL     Additional Data Reviewed: Dragon medical dictation software was used for portions of this report. Unintended errors may occur.       Signed By: Jordan Story MD     January 20, 2023

## 2023-01-20 NOTE — PROGRESS NOTES
conducted a Follow up consultation and Spiritual Assessment for Vu Weiner, who is a 81 y.o.,female. The  provided the following Interventions:  Continued the relationship of care and support. Listened empathically. Offered prayer and assurance of continued prayer on patients behalf. Chart reviewed. The following outcomes were achieved:  Patient expressed gratitude for 's visit. Assessment:  Patient shared with me her many concerns including falsely accusing spouse of not being faithful and her son somehow believing that she was dead. She was very tearful as she shared her experience. It sounded like she was dreaming because it did not make a lot of sense. Prayed with her and she appears to be coping better. Plan:  Chaplains will continue to follow and will provide pastoral care on an as needed/requested basis.     [address] [facility] Certified [address] [facility]   [phone]

## 2023-01-20 NOTE — PROGRESS NOTES
Pt not seen for skilled PT due to:    [ ]  Nausea/vomiting  [ ]  Eating  [ ]  Pain  [ ]  Pt lethargic  [ ]  Off Unit  Other: Declined x2 pt reporting she is in too much pain from her incision    Will f/u later as schedule allows. Thank you.   Aurora Ornelas, PT, DPT

## 2023-01-20 NOTE — WOUND CARE
Physical Exam  Room 459: colostomy care & teaching   Pt is in bed, no  at bedside at this time. LLQ abdomen, colostomy is black & ischemic, peristomal skin is purple,   peristomal care provided with tap water & washcloth, stoma mucosa is about 1 3/4\" round,  Skin provided with skin prep & barrier ring, fitted with 2 piece Coloplast cut to fit appliance,   & lubricating deodorant. Taught pt about stoma care & how to place an appliance. Pt is slow & deliberate to talk. 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 Extra supplies at bedside on high shelf in room. Will plan to see patient again next week for continued care & teaching. Notified Aminah Torres CM of need for home health upon discharge. Home health for ostomy care order written. Updated Dr. Lne Hoover via phone re: photo above & peristomal purple discoloration.    Florecita LOZANO, RN, [facility], [address]

## 2023-01-20 NOTE — PROGRESS NOTES
RENAL DAILY PROGRESS NOTE              Subjective:       Complaint:   Overnight events noted  no nausea, vomiting, chest pain  Breathing is better  Had go to OR  1/18/22 for perforated diverticulum and now has colostomy  Feels very hungry    IMPRESSION:   IMPRESSION:   ESRD TTS  Access: TDC,maturing av fistula on right  arm(placed 3 weeks ago-awaiting rpt procedure for superfilization)   Abdominal perforation due to diverticulitis. s/p procedure and colostomy  COPD excerebration. CAD,cardiomyopathy  Secondary hyperparathyroidism  ACD   PLAN:   Will keep her on schedule with dialysis. Some bleeding in colostomy bag. Will hold heparin during HD  C/w epogen  Diet per surgery  Avoid Gadolinium due to its association with nephrogenic systemic fibrosis in a patients with severe ARF and ESRD. Please dose all medications for creatinine clearance <15/dialysis. Avoid blood pressure checks, blood draws, peripheral iv's on arm with access.                  Current Facility-Administered Medications   Medication Dose Route Frequency    ipratropium (ATROVENT) 0.02 % nebulizer solution 0.5 mg  0.5 mg Nebulization BID RT    oxyCODONE-acetaminophen (PERCOCET) 5-325 mg per tablet 1 Tablet  1 Tablet Oral Q4H PRN    traMADoL (ULTRAM) tablet 50 mg  50 mg Oral Q6H PRN    [START ON 1/21/2023] epoetin tonny-epbx (RETACRIT) injection 4,000 Units  4,000 Units SubCUTAneous Q TUE, THU & SAT    metoprolol (LOPRESSOR) injection 2.5 mg  2.5 mg IntraVENous Q6H    naloxone (NARCAN) injection 0.4 mg  0.4 mg IntraVENous PRN    pantoprazole (PROTONIX) 40 mg in 0.9% sodium chloride 10 mL injection  40 mg IntraVENous Q12H    0.9% sodium chloride infusion  50 mL/hr IntraVENous CONTINUOUS    piperacillin-tazobactam (ZOSYN) 3.375 g in 0.9% sodium chloride (MBP/ADV) 100 mL MBP  3.375 g IntraVENous Q12H    hydrocortisone Sod Succ (PF) (SOLU-CORTEF) injection 100 mg  100 mg IntraVENous Q6H    HYDROmorphone (DILAUDID) injection 1 mg  1 mg IntraVENous Q3H PRN    sodium chloride (OCEAN) 0.65 % nasal squeeze bottle 2 Spray  2 Spray Both Nostrils Q2H PRN    [Held by provider] melatonin tablet 5 mg  5 mg Oral QHS    [Held by provider] heparin (porcine) injection 5,000 Units  5,000 Units SubCUTAneous Q8H    [Held by provider] aspirin chewable tablet 162 mg  162 mg Oral DAILY    [Held by provider] atorvastatin (LIPITOR) tablet 80 mg  80 mg Oral QHS    [Held by provider] ezetimibe (ZETIA) tablet 10 mg  10 mg Oral DAILY    [Held by provider] gabapentin (NEURONTIN) capsule 400 mg  400 mg Oral DAILY    sodium chloride (NS) flush 5-40 mL  5-40 mL IntraVENous Q8H    sodium chloride (NS) flush 5-40 mL  5-40 mL IntraVENous PRN    acetaminophen (TYLENOL) tablet 650 mg  650 mg Oral Q6H PRN    Or    acetaminophen (TYLENOL) suppository 650 mg  650 mg Rectal Q6H PRN    polyethylene glycol (MIRALAX) packet 17 g  17 g Oral DAILY PRN    bisacodyL (DULCOLAX) suppository 10 mg  10 mg Rectal DAILY PRN    ondansetron (ZOFRAN ODT) tablet 4 mg  4 mg Oral Q8H PRN    Or    ondansetron (ZOFRAN) injection 4 mg  4 mg IntraVENous Q6H PRN    arformoteroL (BROVANA) neb solution 15 mcg  15 mcg Nebulization BID RT    budesonide (PULMICORT) 500 mcg/2 ml nebulizer suspension  500 mcg Nebulization BID RT    [Held by provider] metoprolol tartrate (LOPRESSOR) tablet 25 mg  25 mg Oral BID    insulin lispro (HUMALOG) injection   SubCUTAneous AC&HS    glucose chewable tablet 16 g  4 Tablet Oral PRN    glucagon (GLUCAGEN) injection 1 mg  1 mg IntraMUSCular PRN    dextrose 10% infusion 0-250 mL  0-250 mL IntraVENous PRN    [Held by provider] insulin glargine (LANTUS) injection 5 Units  5 Units SubCUTAneous QHS    [Held by provider] guaiFENesin ER (MUCINEX) tablet 600 mg  600 mg Oral Q12H       Review of Symptoms: comprehensive ROS negative except above.    Objective:   Patient Vitals for the past 24 hrs:   Temp Pulse Resp BP SpO2   01/20/23 0754 97.4 °F (36.3 °C) 77 16 125/71 99 %   01/20/23 0719 -- -- -- -- 98 %   01/20/23 0620 -- 82 -- (!) 146/71 --   01/20/23 0400 98.6 °F (37 °C) 82 14 (!) 146/71 99 %   01/20/23 0000 97.4 °F (36.3 °C) 81 14 139/65 99 %   01/19/23 2121 -- -- -- -- 97 %   01/19/23 2028 99 °F (37.2 °C) (!) 115 20 114/71 98 %   01/19/23 1515 98.1 °F (36.7 °C) (!) 106 20 111/72 92 %   01/19/23 1250 97 °F (36.1 °C) (!) 102 20 127/75 --   01/19/23 1241 -- (!) 111 20 115/66 --   01/19/23 1230 -- (!) 58 -- 94/61 --   01/19/23 1215 -- (!) 120 18 110/67 --   01/19/23 1200 -- (!) 113 18 104/69 --   01/19/23 1145 -- (!) 110 18 (!) 172/100 --   01/19/23 1130 -- (!) 118 18 (!) 140/82 --   01/19/23 1115 -- 73 18 131/87 --          Weight change:      01/18 1901 - 01/20 0700  In: 100 [I.V.:100]  Out: 2703     Intake/Output Summary (Last 24 hours) at 1/20/2023 1111  Last data filed at 1/19/2023 1241  Gross per 24 hour   Intake --   Output 2673 ml   Net -2673 ml       Physical Exam:   General: comfortable, no acute distress   HEENT sclera anicteric, supple neck, no thyromegaly  CVS: S1S2 heard,  no rub  RS: + air entry b/l  Abd: Soft,colostomy  Neuro: non focal, awake, alert , CN II-XII are grossly intact  Extrm: no edema, no cyanosis, clubbing   Skin: no visible  Rash  Musculoskeletal: No gross joints or bone deformities   Access: maturing right arm access      Data Review:     LABS:   Hematology:   Recent Labs     01/20/23  0610 01/18/23  1211 01/17/23  1313   WBC 9.6 12.9 11.5   HGB 9.2* 11.2* 12.3   HCT 30.4* 36.6 39.7       Chemistry:   Recent Labs     01/20/23  0610 01/19/23  0235 01/18/23  1211 01/18/23  0341 01/17/23  1313   BUN 63* 69* 53* 49*  --    CREA 3.70* 2.81* 1.91* 1.89*  --    CA 9.4 9.3 9.4 9.1  --    ALB 2.7* 3.1* 3.4 3.0* 3.6   K 5.2 5.9* 5.3 4.8  --     136 135* 138  --     105 104 106  --    CO2 24 22 25 27  --    PHOS 7.3* 7.6*  --  3.9  --    * 165* 180* 103*  --               Procedures/imaging: see electronic medical records for all procedures, Xrays and details which were not copied into this note but were reviewed prior to creation of Plan          Assessment & Plan:       See above      Rico Warren MD  1/20/2023

## 2023-01-20 NOTE — PROGRESS NOTES
POD# 2    Admit Date: 1/13/2023    Assessment    Virgilio Perez is a 81 y.o. female POD 2 exploratory laparotomy, sigmoid colectomy, end colostomy    Patient Active Problem List   Diagnosis Code    Cardiomyopathy, dilated ([facility] I42.0    Environmental allergies Z91.09    Essential hypertension I10    Iron deficiency anemia D50.9    Decreased GFR R94.4    Dyslipidemia, goal LDL below 70 E78.5    Former smoker Z87.891    Osteoarthritis of both knees M17.0    Chronic pain of both knees M25.561, M25.562, G89.29    Chronic pain syndrome G89.4    Primary osteoarthritis of both knees M17.0    Chronic obstructive pulmonary disease (HCC) J44.9    Compliance with medication regimen Z91.89    Bilateral shoulder pain M25.511, M25.512    Elevated alkaline phosphatase level R74.8    Caregiver stress Z63.6    STEMI (ST elevation myocardial infarction) ([facility]) I21.3    Acute pulmonary edema ([facility]) J81.0    Coronary artery disease involving native coronary artery of native heart without angina pectoris I25.10    Pulmonary edema J81.1    SOB (shortness of breath) R06.02    Coronary artery disease involving native coronary artery with unstable angina pectoris ([facility]) I25.110    Type 2 diabetes mellitus with hyperglycemia, with long-term current use of insulin ([facility]) E11.65, Z79.4    Severe obesity (BMI 35.0-39. 9) with comorbidity ([facility]) E66.01    Type 2 diabetes mellitus with hyperglycemia ([facility]) E11.65    TESSA on CPAP G47.33, Z99.89    Ischemic cardiomyopathy I25.5    Syncope R55    Flash pulmonary edema ([facility]) J81.0    Elevated d-dimer R79.89    Acute respiratory failure with hypoxia ([facility]) J96.01    Pulmonary edema cardiac cause ([facility]) I50.1    COPD with acute exacerbation ([facility]) J44.1    Respiratory failure requiring intubation ([facility]) J96.90    CAD (coronary artery disease) I25.10    Chronic systolic congestive heart failure ([facility]) I50.22    Nonrheumatic mitral valve regurgitation I34.0    Sepsis ([facility]) A41.9    Hypotension I95.9    Stage 1 acute kidney injury ([facility] N17.9    Hyperkalemia E87.5    Acute hyperkalemia E87.5    Bre[facility] cancer ([facility] C50.919    Rectal cancer (HCC) C20    History of myocardial infarction I25.2    Pulmonary hypertension (HCC) I27.20    Acute exacerbation of CHF (congestive heart failure) ([facility]) I50.9    Pneumonia due to COVID-19 virus U07.1, J12.82    Acute on chronic diastolic heart failure (HCC) I50.33    Stage 3b chronic kidney disease ([facility]) N18.32    CHF (congestive heart failure) ([facility]) I50.9    CVA (cerebral vascular accident) ([facility] I63.9    ESRD (end stage renal disease) on dialysis ([facility] N18.6, Z99.2    TIA (transient ischemic attack) G45.9    Dizziness R42    Stenosis of both vertebral arteries I65.03    Sinus tachycardia R00.0    Chronic heart failure with preserved ejection fraction ([facility]) I50.32    Anemia due to chronic kidney disease, on chronic dialysis ([facility]) N18.6, D63.1, Z99.2       Plan  -sips of clears and meds ok- would not advance diet until stoma begins to function  -stoma is ischemic and patient has been sleeping on the stoma- I think this in addition to her weight is contributing to the appearance of the stoma this morning- she was instructed to lay on her back and avoid side sleeping and pressure on the newly created ostomy  -continue current antibiotics  -encouraged ongoing ambulation and ISB use today  -resume DVT prophylaxis    Subjective    Overnight events: No acute events overnight. She has pain. No nausea or vomiting    Review of Systems   Constitutional:  Negative for fever. Gastrointestinal:  Positive for abdominal distention and abdominal pain.      Objective    Physical Exam:   Visit Vitals  BP (!) 146/71   Pulse 82   Temp 97.4 °F (36.3 °C)   Resp 14   Ht 5' 4\" (1.626 m)   Wt 97.2 kg (214 lb 4.8 oz)   SpO2 98%   Breastfeeding Unknown   BMI 36.78 kg/m²       Intake/Output Summary (Last 24 hours) at 1/20/2023 8087  Last data filed at 1/19/2023 1241  Gross per 24 hour   Intake --   Output 2673 ml   Net -2673 ml     Physical Exam  Constitutional:       Appearance: She is obese. Cardiovascular:      Rate and Rhythm: Normal rate. Abdominal:      General: There is distension. Palpations: Abdomen is soft. Tenderness: There is abdominal tenderness. There is no guarding. Comments: Midline incision intact, ostomy ischemic- bloody drainage in bag, no flatus   Neurological:      Mental Status: She is alert.              Pinky Alcantar DO  Phone: [phone]

## 2023-01-20 NOTE — PROGRESS NOTES
Problem: Self Care Deficits Care Plan (Adult)  Goal: *Acute Goals and Plan of Care (Insert Text)  Description: Occupational Therapy Goals  Initiated 1/15/2023 within 7 day(s). 1.  Patient will perform lower body dressing with modified independence and no increased SOB  2. Patient will perform bathing with modified independence and no increased SOB  3. Patient will maneuver on and off BSC with modified independence and no increased SOB  7. Patient will demonstrate independence with upper trunk alignment, pacing and modified proper breathing techniques in preparation for her efficient completion of her self care routine    Prior Level of Function: she reports she is independent with her self care and has significant SOB and difficulty with task completion. She lives with her  who assists her with her IADL's     Outcome: Progressing Towards Goal   OCCUPATIONAL THERAPY TREATMENT    Patient: Candace Akbar (50 y.o. female)  Date: 1/20/2023  Diagnosis: COPD exacerbation ([facility] [address] [J44.1] COPD with acute exacerbation ([facility] [facility]  Procedure(s) (LRB):  LAPAROTOMY EXPLORATORY, COLECTOMY, COLOSTOMY CREATION (N/A) 2 Days Post-Op  Precautions: Fall    Chart, occupational therapy assessment, plan of care, and goals were reviewed. ASSESSMENT:  Pt seated EOB upon entry. Pt c/o abdominal pain at incision w/minimal movement. Reviewed energy conservation techniques w/ADLs and importance OOB. Pt perform simple ADLs at EOB w/increase time. Pt requires vc's for hand placement w/STS and CGA w/lateral steps to [address]. Educated pt on \"log roll\" technique w/bed mobility to minimize c/o abdominal pain. Encouraged UE TherEx throughout the day.    Progression toward goals:  []          Improving appropriately and progressing toward goals  [x]          Improving slowly and progressing toward goals  []          Not making progress toward goals and plan of care will be adjusted     PLAN:  Patient continues to benefit from skilled intervention to address the above impairments. Continue treatment per established plan of care. Further Equipment Recommendations for Discharge:  shower chair and rolling walker    AMPAC: 15/24    Current research shows that an AM-PAC score of 17 or less is not associated with a discharge to the patient's home setting. Based on an AM-PAC score of **/24 and their current ADL deficits; it is recommended that the patient have 5-7 sessions per week of Occupational Therapy at d/c to increase the patient's independence. Currently, this patient demonstrates the potential endurance, and/or tolerance for 3 hours of therapy each day at d/c. This AMPAC score should be considered in conjunction with interdisciplinary team recommendations to determine the most appropriate discharge setting. Patient's social support, diagnosis, medical stability, and prior level of function should also be taken into consideration. SUBJECTIVE:   Patient stated I just want to go home.     OBJECTIVE DATA SUMMARY:   Cognitive/Behavioral Status:  Neurologic State: Alert  Orientation Level: Oriented X4  Cognition: Follows commands  Safety/Judgement: Fall prevention    Functional Mobility and Transfers for ADLs:   Bed Mobility:  Supine to Sit: Additional time;Contact guard assistance     Transfers:  Sit to Stand: Contact guard assistance    Balance:  Sitting: Intact  Standing: Impaired; With support    ADL Intervention:  Grooming  Position Performed: Seated edge of bed  Washing Hands: Set-up    Upper [address]: Stand-by assistance    Pain:  Pain level pre-treatment: 0/10   Pain level post-treatment: 0/10  Pt c/o abdominal pain, although pain not rated. Activity Tolerance:    Good    Please refer to the flowsheet for vital signs taken during this treatment.   After treatment:   []  Patient left in no apparent distress sitting up in chair  [x]  Patient left in no apparent distress in bed  [x]  Call bell left within reach  []  Nursing notified  []  Caregiver present  []  Bed alarm activated    COMMUNICATION/EDUCATION:   [] Role of Occupational Therapy in the acute care setting  [] Home safety education was provided and the patient/caregiver indicated understanding. [x] Patient/family have participated as able in working towards goals and plan of care. [x] Patient/family agree to work toward stated goals and plan of care. [] Patient understands intent and goals of therapy, but is neutral about his/her participation. [] Patient is unable to participate in goal setting and plan of care. Thank you for this referral.  HUGO Garcia  Time Calculation: 23 mins    [employer]-PAC® Daily Activity Inpatient Short Form (6-Clicks)*    How much HELP from another person does the patient currently need    (If the patient hasn't done an activity recently, how much help from another person do you think he/she would need if he/she tried?)   Total (Total A or Dep)   A Lot  (Mod to Max A)   A Little (Sup or Min A)   None (Mod I to I)   Putting on and taking off regular lower body clothing? [] 1 [x] 2 [] 3 [] 4   2. Bathing (including washing, rinsing,      drying)? [] 1 [x] 2 [] 3 [] 4   3. Toileting, which includes using toilet, bedpan or urinal?   [] 1 [x] 2 [] 3 [] 4   4. Putting on and taking off regular upper body clothing? [] 1 [] 2 [x] 3 [] 4   5. Taking care of personal grooming such as brushing teeth? [] 1 [] 2 [x] 3 [] 4   6. Eating meals?    [] 1 [] 2 [x] 3 [] 4

## 2023-01-20 NOTE — PROGRESS NOTES
[facility]  Pulmonary, Critical Care, and Sleep Medicine    Name: Yousif Brown MRN: [id number]   : 1950 Hospital: [address] [place] [address] Dr   Date: 2023        Pulmonary -Critical Care: progress note    Admission Date:   2023  LOS: 7  MAR reviewed and pertinent medications noted or modified as needed    IMPRESSION:   Acute hypoxic respiratory failure - Improving  Wheezing secondary to COPD, component of cardiac asthma with chronic systolic and diastolic heart failure. Wheezing  - COPD-previous imaging with small cystic changes noted on CT scan but PFTs with predominant restrictive lung disease. She does have a history of smoking and secondhand smoke exposure and may have component of  \"cardiac asthma\" in the setting of chronic systolic/diastolic heart failure, favor the latter Echo from 2015 with EF 45% and mild to moderate mitral regurgitation. CXR per my review notes some vascular congestion at bases RIGHT > LEFT  Dysphonia: Persistent Upper airway sounds: Possible soft tissue and/or Vocal Cord Dysfunction  Abdominal pain-new symptoms with CT scan showing possible perforation- now s/p ex lap with sigmoid colectomy end colostomy  Hypertension: CLVH on Echo  CHF: HFpEF: Diastolic dysfunction noted on echo, history of CAD with STEMI in 2018  TESSA  Was previously followed by Dr. Cyndi Vargas- Lost to follow up and has not been seen for an extended period of time. DM 2  ESRD- HD dependent  Former smoker 46 PY, quit in 2019, + 2nd hand smoke exposure from   Chronic pain syndrome  Morbid Obesity Body mass index is 41.81 kg/m²  H/O COVID Infection .   H/O Abnormal CT Chest Imagin2022- diffuse asymmetric GGOs and atelectasis- most likely from pulmonary edema- Resolved on CT Chest 22         RECOMMENDATIONS:     D/C brovana, pulmicort and scheduled bronchodilators  Start back her outpatient Stiolto daily   Albuterol nebs prn  Wean down stress dose steroids to hydrocortisone 100 mg q12  If upper airway sounds over VC/neck region persist.  Patient also ex-smoker- consider ENT evaluation of VC with NP scope. Patient's upper airway sounds may be due to redundant soft tissue in neck vs vocal cord dysfunction  Maintain aspiration precautions: HOB >30 degrees  SpO2 goal >92%  Trial of QHS BIPAP  Bronchial /oral hygiene; IS at bedside please  Monitor I&Os  Continue antibiotics-extend to abdominal coverage  HD per nephrology  Electrolyte management, prophylaxis and glycemic control per primary and respective consultants  OP Pulmonary- Sleep follow up  Will follow. CODE STATUS: Full Code        Subjective/History: This patient has been seen and evaluated at the request of Dr. Erendira Mcclain for COPD exacerbation. HPI  Patient is a 81 y.o. female  PMHx of ESRD on HD, COPD not on home O2, CAD, DM, HTN, HFpEF. The patient has been seen by our group as an in-patient. She has not followed up with any Pulmonologist as an OP. The patient states she did not realized she needed to follow up with Pulmonary but that she will in the future. The patient reports increasing shortness of breath and wheezing over the past week- more significant after her HD session. She reports that she had to cut her run 15 minutes early so she could get her medial ride. When she got home she became more short of breath with wheeling. She also noted nausea without emesis. No known sick contacts. No fever or chills. No recurrent nausea. No emesis,  No chest pain  She continues to refrain from smoking, but her  does smoke with in and out side the home. Inhalers: Anoro and PRN albuterol nebs  Respiratory Triggers: smoke and cold air  Pets:  2 has 4: small dogs: chihuahua -poodle mixes- they do not sleep with patient and have their own room  Home: No known water leaks, no bug infestations  Obstructive Sleep Apnea (TESSA): The patient has history of TESSA.   She was previously followed by  [facility].  She reports that she had a recalled [employer] PAP device. Her DME was [facility]. She reports that she did register her device with [employer], but never got a replacement. The patient stopped using her device and actually brought her device back to [facility]. She has not used PAP therapy for an extended period of time. She is HD dependant. She denies any diet indiscretions. The patient had ID earlier today in our HD unit. She has a THDC in her right upper chest wall. She had a AVF created a few weeks ago and is not ready for use    Interval Evaluation:   01/20/23  LOS: 7    Patient is POD 2 from ex-lap with sigmoid colectomy and colostomy.   Notes significant abd pain - was just given dilaudid  She states her breathing is stable, no complaints  Notes that she gets really shaky after neb treatments and would like to back off frequency  No chest pain  No hemoptysis        Patient Vitals for the past 24 hrs:   Temp Pulse Resp BP SpO2   01/20/23 1134 98 °F (36.7 °C) 85 17 (!) 148/72 94 %   01/20/23 0754 97.4 °F (36.3 °C) 77 16 125/71 99 %   01/20/23 0719 -- -- -- -- 98 %   01/20/23 0620 -- 82 -- (!) 146/71 --   01/20/23 0400 98.6 °F (37 °C) 82 14 (!) 146/71 99 %   01/20/23 0000 97.4 °F (36.3 °C) 81 14 139/65 99 %   01/19/23 2121 -- -- -- -- 97 %   01/19/23 2028 99 °F (37.2 °C) (!) 115 20 114/71 98 %   01/19/23 1515 98.1 °F (36.7 °C) (!) 106 20 111/72 92 %   01/19/23 1250 97 °F (36.1 °C) (!) 102 20 127/75 --   01/19/23 1241 -- (!) 111 20 115/66 --   01/19/23 1230 -- (!) 58 -- 94/61 --   01/19/23 1215 -- (!) 120 18 110/67 --   01/19/23 1200 -- (!) 113 18 104/69 --         Past Medical History:   Diagnosis Date    Abnormal WBC count 05/17/2016    Anemia     Bilateral shoulder pain 09/27/2016    Chondromalacia of both patellae     Chronic heart failure with preserved ejection fraction ([facility] 1/13/2023    COPD (chronic obstructive pulmonary disease) ([facility] 09/2015    mild-mod dz; Dr Caryle Napoleon    Diabetes [facility]) 2013    Diabetic eye exam ([facility] 2016    Dilated cardiomyopathy ([facility]     Dyslipidemia     Gout     Heart attack ([facility] 10/18/2019    Stented    History of echocardiogram 11/14/2014    Mild LVE. EF 40%. Mild, diffuse hypk. Mild LAE. Mild MR.       Hypercholesteremia 01/08/2014    Hypertension 2000    Noncompliance with medications 02/16/2016    Obesity     Orthostatic hypotension 05/17/2016    Osteoarthritis of both knees     Pain management 04/01/2016    Dr. Swapna Fenton     Popliteal cyst, bilateral      Sleep apnea     not using cpap    Stage 3b chronic kidney disease ([facility] 08/08/2022    Vitamin D deficiency 10/16/2014      Past Surgical History:   Procedure Laterality Date    HX HEART CATHETERIZATION  2019    Coronary stent    HX TUBAL LIGATION      IR INSERT TUNL CVC W/O PORT OVER 5 YR  09/21/2022        Current Facility-Administered Medications   Medication Dose Route Frequency    hydrocortisone Sod Succ (PF) (SOLU-CORTEF) injection 100 mg  100 mg IntraVENous Q12H    [START ON 1/21/2023] tiotropium-olodateroL (STIOLTO RESPIMAT) 2.5-2.5 mcg/actuation inhaler 2 Puff  2 Puff Inhalation DAILY    [START ON 1/21/2023] epoetin tonny-epbx (RETACRIT) injection 4,000 Units  4,000 Units SubCUTAneous Q TUE, THU & SAT    metoprolol (LOPRESSOR) injection 2.5 mg  2.5 mg IntraVENous Q6H    pantoprazole (PROTONIX) 40 mg in 0.9% sodium chloride 10 mL injection  40 mg IntraVENous Q12H    0.9% sodium chloride infusion  50 mL/hr IntraVENous CONTINUOUS    piperacillin-tazobactam (ZOSYN) 3.375 g in 0.9% sodium chloride (MBP/ADV) 100 mL MBP  3.375 g IntraVENous Q12H    [Held by provider] melatonin tablet 5 mg  5 mg Oral QHS    [Held by provider] heparin (porcine) injection 5,000 Units  5,000 Units SubCUTAneous Q8H    [Held by provider] aspirin chewable tablet 162 mg  162 mg Oral DAILY    [Held by provider] atorvastatin (LIPITOR) tablet 80 mg  80 mg Oral QHS    [Held by provider] ezetimibe (ZETIA) tablet 10 mg  10 mg Oral DAILY    [Held by provider] gabapentin (NEURONTIN) capsule 400 mg  400 mg Oral DAILY    sodium chloride (NS) flush 5-40 mL  5-40 mL IntraVENous Q8H    [Held by provider] metoprolol tartrate (LOPRESSOR) tablet 25 mg  25 mg Oral BID    insulin lispro (HUMALOG) injection   SubCUTAneous AC&HS    [Held by provider] insulin glargine (LANTUS) injection 5 Units  5 Units SubCUTAneous QHS    [Held by provider] guaiFENesin ER (MUCINEX) tablet 600 mg  600 mg Oral Q12H     No Known Allergies     Review of Systems:  A comprehensive review of systems was negative except for that written in the HPI. Objective:   Vital Signs:    Visit Vitals  BP (!) 148/72   Pulse 85   Temp 98 °F (36.7 °C)   Resp 17   Ht 5' 4\" (1.626 m)   Wt 97.2 kg (214 lb 4.8 oz)   SpO2 94%   Breastfeeding Unknown   BMI 36.78 kg/m²       O2 Device: Nasal cannula   O2 Flow Rate (L/min): 2 l/min   Temp (24hrs), Av.9 °F (36.6 °C), Min:97 °F (36.1 °C), Max:99 °F (37.2 °C)       Intake/Output:   Last shift:      No intake/output data recorded. Last 3 shifts:  1901 -  0700  In: 100 [I.V.:100]  Out: 2703     Intake/Output Summary (Last 24 hours) at 2023 1151  Last data filed at 2023 1241  Gross per 24 hour   Intake --   Output 2673 ml   Net -2673 ml         Physical Exam:    General:  Alert, cooperative, no distress, appears stated age + Obese body habitus   Head:  Normocephalic, without obvious abnormality, atraumatic. Eyes:  Conjunctivae/corneas clear. PERRL, EOMs intact. Nose: Nares normal. Septum midline. Mucosa normal. No drainage or sinus tenderness. Throat: Lips, mucosa, and tongue normal. No teeth   Neck: Supple, symmetrical, trachea midline, no adenopathy, thyroid: no enlargment/tenderness/nodules, no carotid bruit and no JVD. Back:   Symmetric,   Lungs:   Bilateral breath sounds present. Clear to auscultation   Chest wall:  THDC right upper chest wall.  No tenderness or deformity.- No crepitus   Heart:  Regular rate and rhythm, S1, S2 normal, no murmur, click, rub or gallop. Abdomen:   Soft, rebound tenderness left lower quadrant. Extremities: Extremities normal, atraumatic, no cyanosis or edema. - unmatured AFV right arm- + palpable thrill   Pulses: 2+ and symmetric all extremities. Skin: Skin color, texture, turgor normal. No rashes or lesions   Lymph nodes:      Cervical, supraclavicular nodes: normal on palpation   Neurologic: Grossly nonfocal       Data:     Recent Labs     01/20/23  0610 01/18/23  1211 01/17/23  1313   WBC 9.6 12.9 11.5   HGB 9.2* 11.2* 12.3   HCT 30.4* 36.6 39.7    378 345        Latest Reference Range & Units 12/9/22 12:48 12/10/22 04:27 12/11/22 02:46 12/12/22 04:35 12/13/22 01:46 1/13/23 09:55   EOSINOPHILS 0 - 5 % 3 3 2 2 2 1   ABS. EOSINOPHILS 0.0 - 0.4 K/UL 0.1 0.1 0.1 0.1 0.1 0.0         Recent Labs     01/20/23  0610 01/19/23  0235 01/18/23  1211 01/18/23  0341 01/17/23  1313 01/17/23  1313    136 135* 138   < >  --    K 5.2 5.9* 5.3 4.8   < >  --     105 104 106   < >  --    CO2 24 22 25 27   < >  --    * 165* 180* 103*   < >  --    BUN 63* 69* 53* 49*   < >  --    CREA 3.70* 2.81* 1.91* 1.89*   < >  --    CA 9.4 9.3 9.4 9.1   < >  --    MG  --   --  2.4  --   --   --    PHOS 7.3* 7.6*  --  3.9  --   --    ALB 2.7* 3.1* 3.4 3.0*  --  3.6   ALT  --   --  55  --   --  87*   INR  --   --  1.1  --   --   --     < > = values in this interval not displayed. Lab Results   Component Value Date/Time    NT pro-BNP 1,057 (H) 10/08/2022 05:15 PM    NT pro-BNP 2,013 (H) 09/09/2022 07:34 AM    NT pro-BNP 1,871 (H) 08/08/2022 01:48 AM    NT pro-BNP 3,952 (H) 07/08/2022 09:53 AM    NT pro-BNP 1,020 (H) 03/18/2021 01:29 AM     No results for input(s): PH, PCO2, PO2, HCO3, FIO2 in the last 72 hours. No results for input(s): FIO2I, IFO2, HCO3I, IHCO3, HCOPOC, PCO2I, PCOPOC, IPHI, PHI, PHPOC, PO2I, PO2POC in the last 72 hours.     No lab exists for component: IPOC2      MICRO:  Results       Procedure Component Value Units Date/Time    CULTURE, BODY FLUID [employer] STAIN [[id number]] Collected: 01/19/23 1730    Order Status: Sent Specimen: Abdominal Fluid Updated: 01/20/23 0958    RESPIRATORY VIRUS PANEL W/COVID-19, PCR [[id number]] Collected: 01/13/23 1737    Order Status: Completed Specimen: Nasopharyngeal Updated: 01/13/23 1858     Adenovirus Not detected        Coronavirus 229E Not detected        Coronavirus HKU1 Not detected        Coronavirus CVNL63 Not detected        Coronavirus OC43 Not detected        SARS-CoV-2, PCR Not detected        Metapneumovirus Not detected        Rhinovirus and Enterovirus Not detected        Influenza A Not detected        Influenza A, subtype H1 Not detected        Influenza A, subtype H3 Not detected        INFLUENZA A H1N1 PCR Not detected        Influenza B Not detected        Parainfluenza 1 Not detected        Parainfluenza 2 Not detected        Parainfluenza 3 Not detected        Parainfluenza virus 4 Not detected        RSV by PCR Not detected        B. parapertussis, PCR Not detected        Bordetella pertussis - PCR Not detected        Chlamydophila pneumoniae DNA, QL, PCR Not detected        Mycoplasma pneumoniae DNA, QL, PCR Not detected                 CARDIO:    EKG Results       Procedure 720 Value Units Date/Time    EKG, 12 LEAD, INITIAL [[id number]] Collected: 01/13/23 0942    Order Status: Completed Updated: 01/13/23 1856     Ventricular Rate 98 BPM      Atrial Rate 98 BPM      P-R Interval 160 ms      QRS Duration 78 ms      Q-T Interval 360 ms      QTC Calculation (Bezet) 459 ms      Calculated P Axis 74 degrees      Calculated R Axis 20 degrees      Calculated T Axis 86 degrees      Diagnosis --     Normal sinus rhythm  Left atrial enlargement  Minimal voltage criteria for LVH, may be normal variant ( Dave product )  Borderline ECG  When compared with ECG of 08-DEC-2022 12:19,  No significant change was found  Confirmed by Barbra Lange [id number]) on 1/13/2023 6:56:19 PM              06/25/22    ECHO ADULT COMPLETE 06/29/2022 6/29/2022    Interpretation Summary    Left Ventricle: Mildly reduced left ventricular systolic function with a visually estimated EF of 50 - 55%. Left ventricle is mildly dilated. Increased wall thickness. Findings consistent with mild concentric hypertrophy. See diagram for wall motion findings. Diastolic dysfunction present with normal LV EF. Mitral Valve: Mildly thickened leaflet. Mild regurgitation with a centrally directed jet. Signed by: Mike Torres MD on 6/29/2022 12:12 PM    Imaging:  I have personally reviewed the patients radiographs and have reviewed the reports:                  Chest CT:  9/20/22  IMPRESSION     1. Interval resolution of extensive bilateral lung airspace disease. 2.  Interval resolved small bilateral pleural effusions and mild cardiomegaly. 3.  Interval resolved lymphadenopathy. 4.  Chronic interstitial reticular and cystic changes of the lungs appear  stable. Thank you for your referral.            Complex decision making was made in the evaluation and management plans during this consultation. More than 50% of time was spent in counseling and coordination of care including review of data and discussion with other team members. Elsi Haas.  [facility]  Pulmonary, Critical Care, and Sleep Medicine

## 2023-01-20 NOTE — PROGRESS NOTES
01/20/23 0719   Oxygen Therapy   O2 Sat (%) 98 %   Pulse via Oximetry 86 beats per minute   O2 Device Nasal cannula   O2 Flow Rate (L/min) 2 l/min     Patient refused 0800 breathing treatment. No respiratory distress or issues noted. Patient stated \" breathing treatments makes me anxious. \"

## 2023-01-21 LAB
ALBUMIN SERPL-MCNC: 2.5 G/DL (ref 3.4–5)
ANION GAP SERPL CALC-SCNC: 5 MMOL/L (ref 3–18)
BASOPHILS # BLD: 0 K/UL (ref 0–0.1)
BASOPHILS NFR BLD: 0 % (ref 0–2)
BUN SERPL-MCNC: 70 MG/DL (ref 7–18)
BUN/CREAT SERPL: 19 (ref 12–20)
CALCIUM SERPL-MCNC: 8.6 MG/DL (ref 8.5–10.1)
CHLORIDE SERPL-SCNC: 106 MMOL/L (ref 100–111)
CO2 SERPL-SCNC: 25 MMOL/L (ref 21–32)
CREAT SERPL-MCNC: 3.73 MG/DL (ref 0.6–1.3)
DIFFERENTIAL METHOD BLD: ABNORMAL
EOSINOPHIL # BLD: 0 K/UL (ref 0–0.4)
EOSINOPHIL NFR BLD: 0 % (ref 0–5)
ERYTHROCYTE [DISTWIDTH] IN BLOOD BY AUTOMATED COUNT: 18.3 % (ref 11.6–14.5)
GLUCOSE BLD STRIP.AUTO-MCNC: 124 MG/DL (ref 70–110)
GLUCOSE BLD STRIP.AUTO-MCNC: 144 MG/DL (ref 70–110)
GLUCOSE BLD STRIP.AUTO-MCNC: 158 MG/DL (ref 70–110)
GLUCOSE BLD STRIP.AUTO-MCNC: 189 MG/DL (ref 70–110)
GLUCOSE SERPL-MCNC: 145 MG/DL (ref 74–99)
HCT VFR BLD AUTO: 26.1 % (ref 35–45)
HGB BLD-MCNC: 7.9 G/DL (ref 12–16)
IMM GRANULOCYTES # BLD AUTO: 0.2 K/UL (ref 0–0.04)
IMM GRANULOCYTES NFR BLD AUTO: 2 % (ref 0–0.5)
LYMPHOCYTES # BLD: 0.6 K/UL (ref 0.9–3.6)
LYMPHOCYTES NFR BLD: 6 % (ref 21–52)
MAGNESIUM SERPL-MCNC: 2.3 MG/DL (ref 1.6–2.6)
MCH RBC QN AUTO: 28.1 PG (ref 24–34)
MCHC RBC AUTO-ENTMCNC: 30.3 G/DL (ref 31–37)
MCV RBC AUTO: 92.9 FL (ref 78–100)
MONOCYTES # BLD: 1 K/UL (ref 0.05–1.2)
MONOCYTES NFR BLD: 10 % (ref 3–10)
NEUTS SEG # BLD: 7.8 K/UL (ref 1.8–8)
NEUTS SEG NFR BLD: 81 % (ref 40–73)
NRBC # BLD: 0.24 K/UL (ref 0–0.01)
NRBC BLD-RTO: 2.5 PER 100 WBC
PHOSPHATE SERPL-MCNC: 6 MG/DL (ref 2.5–4.9)
PLATELET # BLD AUTO: 308 K/UL (ref 135–420)
PMV BLD AUTO: 10 FL (ref 9.2–11.8)
POTASSIUM SERPL-SCNC: 4.7 MMOL/L (ref 3.5–5.5)
RBC # BLD AUTO: 2.81 M/UL (ref 4.2–5.3)
SODIUM SERPL-SCNC: 136 MMOL/L (ref 136–145)
WBC # BLD AUTO: 9.7 K/UL (ref 4.6–13.2)

## 2023-01-21 PROCEDURE — C9113 INJ PANTOPRAZOLE SODIUM, VIA: HCPCS | Performed by: EMERGENCY MEDICINE

## 2023-01-21 PROCEDURE — 74011250636 HC RX REV CODE- 250/636: Performed by: PHYSICIAN ASSISTANT

## 2023-01-21 PROCEDURE — 74011000250 HC RX REV CODE- 250: Performed by: EMERGENCY MEDICINE

## 2023-01-21 PROCEDURE — 83735 ASSAY OF MAGNESIUM: CPT

## 2023-01-21 PROCEDURE — 74011000250 HC RX REV CODE- 250: Performed by: SURGERY

## 2023-01-21 PROCEDURE — 74011250637 HC RX REV CODE- 250/637: Performed by: HOSPITALIST

## 2023-01-21 PROCEDURE — 65270000029 HC RM PRIVATE

## 2023-01-21 PROCEDURE — 74011250636 HC RX REV CODE- 250/636: Performed by: EMERGENCY MEDICINE

## 2023-01-21 PROCEDURE — 94761 N-INVAS EAR/PLS OXIMETRY MLT: CPT

## 2023-01-21 PROCEDURE — 74011250636 HC RX REV CODE- 250/636: Performed by: INTERNAL MEDICINE

## 2023-01-21 PROCEDURE — 99232 SBSQ HOSP IP/OBS MODERATE 35: CPT | Performed by: EMERGENCY MEDICINE

## 2023-01-21 PROCEDURE — 80069 RENAL FUNCTION PANEL: CPT

## 2023-01-21 PROCEDURE — 36415 COLL VENOUS BLD VENIPUNCTURE: CPT

## 2023-01-21 PROCEDURE — 90935 HEMODIALYSIS ONE EVALUATION: CPT

## 2023-01-21 PROCEDURE — 74011250636 HC RX REV CODE- 250/636: Performed by: SURGERY

## 2023-01-21 PROCEDURE — 74011636637 HC RX REV CODE- 636/637: Performed by: SURGERY

## 2023-01-21 PROCEDURE — 82962 GLUCOSE BLOOD TEST: CPT

## 2023-01-21 PROCEDURE — 77010033678 HC OXYGEN DAILY

## 2023-01-21 PROCEDURE — 74011250637 HC RX REV CODE- 250/637: Performed by: PHYSICIAN ASSISTANT

## 2023-01-21 PROCEDURE — 74011000258 HC RX REV CODE- 258: Performed by: SURGERY

## 2023-01-21 PROCEDURE — 94640 AIRWAY INHALATION TREATMENT: CPT

## 2023-01-21 PROCEDURE — 85025 COMPLETE CBC W/AUTO DIFF WBC: CPT

## 2023-01-21 RX ORDER — HEPARIN SODIUM 1000 [USP'U]/ML
3800 INJECTION, SOLUTION INTRAVENOUS; SUBCUTANEOUS
Status: DISCONTINUED | OUTPATIENT
Start: 2023-01-24 | End: 2023-01-21

## 2023-01-21 RX ORDER — HEPARIN SODIUM 1000 [USP'U]/ML
3800 INJECTION, SOLUTION INTRAVENOUS; SUBCUTANEOUS
Status: DISCONTINUED | OUTPATIENT
Start: 2023-01-21 | End: 2023-01-25 | Stop reason: HOSPADM

## 2023-01-21 RX ADMIN — PANTOPRAZOLE SODIUM 40 MG: 40 INJECTION, POWDER, FOR SOLUTION INTRAVENOUS at 20:29

## 2023-01-21 RX ADMIN — HYDROMORPHONE HYDROCHLORIDE 1 MG: 1 INJECTION, SOLUTION INTRAMUSCULAR; INTRAVENOUS; SUBCUTANEOUS at 12:54

## 2023-01-21 RX ADMIN — Medication 3 UNITS: at 22:17

## 2023-01-21 RX ADMIN — EZETIMIBE 10 MG: 10 TABLET ORAL at 08:40

## 2023-01-21 RX ADMIN — ASPIRIN 81 MG CHEWABLE TABLET 162 MG: 81 TABLET CHEWABLE at 08:39

## 2023-01-21 RX ADMIN — HYDROMORPHONE HYDROCHLORIDE 1 MG: 1 INJECTION, SOLUTION INTRAMUSCULAR; INTRAVENOUS; SUBCUTANEOUS at 17:28

## 2023-01-21 RX ADMIN — SODIUM CHLORIDE, PRESERVATIVE FREE 10 ML: 5 INJECTION INTRAVENOUS at 18:06

## 2023-01-21 RX ADMIN — HYDROMORPHONE HYDROCHLORIDE 1 MG: 1 INJECTION, SOLUTION INTRAMUSCULAR; INTRAVENOUS; SUBCUTANEOUS at 05:23

## 2023-01-21 RX ADMIN — HYDROMORPHONE HYDROCHLORIDE 1 MG: 1 INJECTION, SOLUTION INTRAMUSCULAR; INTRAVENOUS; SUBCUTANEOUS at 20:27

## 2023-01-21 RX ADMIN — HYDROCORTISONE SODIUM SUCCINATE 100 MG: 100 INJECTION, POWDER, FOR SOLUTION INTRAMUSCULAR; INTRAVENOUS at 18:05

## 2023-01-21 RX ADMIN — GUAIFENESIN 600 MG: 600 TABLET, EXTENDED RELEASE ORAL at 08:46

## 2023-01-21 RX ADMIN — PANTOPRAZOLE SODIUM 40 MG: 40 INJECTION, POWDER, FOR SOLUTION INTRAVENOUS at 08:42

## 2023-01-21 RX ADMIN — HYDROCORTISONE SODIUM SUCCINATE 100 MG: 100 INJECTION, POWDER, FOR SOLUTION INTRAMUSCULAR; INTRAVENOUS at 05:23

## 2023-01-21 RX ADMIN — GUAIFENESIN 600 MG: 600 TABLET, EXTENDED RELEASE ORAL at 20:45

## 2023-01-21 RX ADMIN — Medication 3 UNITS: at 08:41

## 2023-01-21 RX ADMIN — PIPERACILLIN AND TAZOBACTAM 3.38 G: 3; .375 INJECTION, POWDER, FOR SOLUTION INTRAVENOUS at 20:31

## 2023-01-21 RX ADMIN — ATORVASTATIN CALCIUM 80 MG: 40 TABLET, FILM COATED ORAL at 22:16

## 2023-01-21 RX ADMIN — SODIUM CHLORIDE, PRESERVATIVE FREE 10 ML: 5 INJECTION INTRAVENOUS at 05:23

## 2023-01-21 RX ADMIN — HYDROMORPHONE HYDROCHLORIDE 1 MG: 1 INJECTION, SOLUTION INTRAMUSCULAR; INTRAVENOUS; SUBCUTANEOUS at 08:42

## 2023-01-21 RX ADMIN — METOPROLOL TARTRATE 25 MG: 25 TABLET, FILM COATED ORAL at 18:03

## 2023-01-21 RX ADMIN — HYDROMORPHONE HYDROCHLORIDE 1 MG: 1 INJECTION, SOLUTION INTRAMUSCULAR; INTRAVENOUS; SUBCUTANEOUS at 00:38

## 2023-01-21 RX ADMIN — EPOETIN ALFA-EPBX 4000 UNITS: 4000 INJECTION, SOLUTION INTRAVENOUS; SUBCUTANEOUS at 20:42

## 2023-01-21 RX ADMIN — HEPARIN SODIUM 5000 UNITS: 5000 INJECTION INTRAVENOUS; SUBCUTANEOUS at 02:41

## 2023-01-21 RX ADMIN — TIOTROPIUM BROMIDE AND OLODATEROL 2 PUFF: 3.124; 2.736 SPRAY, METERED RESPIRATORY (INHALATION) at 07:41

## 2023-01-21 RX ADMIN — SODIUM CHLORIDE, PRESERVATIVE FREE 10 ML: 5 INJECTION INTRAVENOUS at 22:17

## 2023-01-21 NOTE — PROGRESS NOTES
Received pre HD report from KAITLIN Valle RN. Pt in bed, A+O x4, on O2 via NC @ 2L, no s/s of distress noted. Accessed right side CVC per protocol. Tx initiated at 0909. CVC flowing with ease. For hemodynamic stability UF goal 3500 ml. Offered assistance with repositioning every 2 hours. Vascular access visible at all times during treatment, line connections intact at all times. Pt requested to end treatment 30 minutes early, d/t having had treatment several days in a row. Md notified and instructed ok to end treatment early. Total run time 3 hrs, UF goal not reached d/t early termination. Tx completed at 1213, tolerated well 2.5L removed. De-accessed per protocol. Heparin indwell 1.9ml in arterial, and 1.9ml in venous catheter. Unit nurse given report.                    ACUTE HEMODIALYSIS FLOW SHEET      HEMODIALYSIS ORDERS: Physician:  Eleonora Tinoco     Dialyzer: Revaclear   Duration: 3.5   BFR: 300  DFR: 500   Dialysate:  Temp 36-37*C   K+  2    Ca+ 2.5   Na 138  Bicarb 35   Wt Readings from Last 1 Encounters:        Patient Chart [x]   Unable to Obtain []  Dry weight/UF Goal:3500   Heparin []  Bolus    Units    [] Hourly    Units    [x]None       Pre BP  157/73    Pulse: 83 Respirations: 18 Temp: 98.4 [x] Temporal  [] Ax  [] Esoph   Labs: []  Pre  []  Post:   [x] N/A   Additional Orders (medications, blood products, hypotension management): [] Yes   [x] No       [x]   [facility] Consent Verified       CATHETER ACCESS:  []N/A   [x]Right   []Left   []IJ   []Fem  [x]Chest wall  []TransHepatic   [] First use X-ray verified     [x]Tunnel    [] Non Tunneled   [x]No S/S infection  []Redness  []Drainage []Cultured []Swelling []Pain   [x]Medical Aseptic Prep Utilized   [x]Dressing Changed  [x] Biopatch  Date: 1/21/23   []Clotted   [x]Patent   Flows: [x]Good  []Poor  []Reversed   If access problem,  notified: []Yes    [x]N/A            GENERAL ASSESSMENT:    LUNGS:  Resp Rate 18   [] Clear  [] Coarse  [] Crackles  [] Wheezing  [x] Diminished                                                           [] RLL   [] LLL  [] RUL   [] ISAURO            Respirations:  [x]Easy  []Labored  []N/A  Cough:  []Productive  []Dry  [x]N/A               Therapy:  []RA   [] Ventilated   [] Intubated   [] Trach            O2 Device:  [x] NC   [] NRB  [] Trach Mask  [] BiPaP  Flow:  2 l/min                                                    CARDIAC: [x] Regular      [] Irregular   [] Rhythm:          [] Monitored   [] Bedside   [] Remotely monitored       EDEMA: [] None   [x]Generalized  [] Pitting [] 1+   [] 2 +   [] 3+    [] 4+        SKIN:   [] Hot     [] Cold    [x] Warm   [x] Dry    [] Diaphoretic                 [] Flushed  [] Jaundiced  [] Cyanotic  [] Pale      LOC:    [x] Alert      [x]Oriented:    [x] Person     [x] Place   [x]Time               [] Confused  [] Lethargic  [] Medicated  [] Non-responsive  [] Non-Verbal     GI / ABDOMEN:                     [] Flat    [] Distended    [x] Soft    [] Firm   []  Obese                   [] Diarrhea   [] FMS [] Bowel Sounds  [] Nausea  [] Vomiting                   [] NGT  [] OGT  [] PEG  [] Tube Feedings @     mL/hr     / URINE ASSESSMENT:                   [] Voiding    [] Oliguria  [x] Anuria                     []  Frye   [] Incontinent  []  Incontinent Brief   []  PureWick     PAIN:  [x] 0 []1  []2   []3   []4   []5   []6   []7   []8   []9   []10                MOBILITY:  [x] Bed    [] Stretcher      All Vitals and Treatment Details on Attached [address]: [facility]          Room # 451/01    [x] Routine         [] 1st Time Acute/Chronic   [] Urgent      [] Stat              [x] Acute Room   []  Bedside    [] ICU/CCU     [] ER     Isolation Precautions:  [x] Dialysis    There are currently no Active Isolations     ALLERGIES:     No Known Allergies     Code Status:  Full Code     Hepatitis Status      Lab Results   Component Value Date/Time    Hepatitis A, IgM NEGATIVE 03/23/2015 09:04 AM    Hepatitis B surface Ag <0.10 12/09/2022 07:03 PM    Hepatitis B surface Ab >1,000.00 12/09/2022 07:03 PM    Hepatitis B core, IgM NEGATIVE 03/23/2015 09:04 AM    Hepatitis C virus Ab 0.09 03/23/2015 09:04 AM        Current Labs:      Lab Results   Component Value Date/Time    WBC 9.7 01/21/2023 01:45 AM    HGB 7.9 (L) 01/21/2023 01:45 AM    HCT 26.1 (L) 01/21/2023 01:45 AM    PLATELET 972 65/98/8523 01:45 AM    MCV 92.9 01/21/2023 01:45 AM     Lab Results   Component Value Date/Time    Sodium 136 01/21/2023 01:45 AM    Potassium 4.7 01/21/2023 01:45 AM    Chloride 106 01/21/2023 01:45 AM    CO2 25 01/21/2023 01:45 AM    Anion gap 5 01/21/2023 01:45 AM    Glucose 145 (H) 01/21/2023 01:45 AM    BUN 70 (H) 01/21/2023 01:45 AM    Creatinine 3.73 (H) 01/21/2023 01:45 AM    BUN/Creatinine ratio 19 01/21/2023 01:45 AM    GFR est AA 54 (L) 09/22/2022 04:20 AM    GFR est non-AA 44 (L) 09/22/2022 04:20 AM    Calcium 8.6 01/21/2023 01:45 AM          DIET:  DIET ADULT     PRIMARY NURSE REPORT:   Pre Dialysis: KAITLIN Trejo RN    Time: 0093    EDUCATION:    [x] Patient           Knowledge Basis: []None [x]Minimal [] Substantial [] Unknown  Barriers to learning  [x]None  [] Intubated/Trached/Ventilated  [] Sedated/Paralyzed   [x] Access Care     [] S&S of infection  [] Fluid Management  [] K+   [x] Procedural    [] Medications   [] Tx Options   [] Transplant   [] Diet      Teaching Tools:  [x] Explain  [] Demo  [] Handouts [] Video  Patient response: [x] Verbalized understanding   [] Requires follow up        [x] Time Out/Safety Check    [x] Extracorporeal Circuit Tested for integrity       RO/HEMODIALYSIS MACHINE SAFETY CHECKS - Before each treatment:        [address] [place] [address]                                     [x] Unit Machine # 6 with centralized RO                                  [] Portable Machine #1/RO serial # [id number]                                  [] Portable Machine #2/RO serial # [id number] [] Portable Machine #4/RO serial # [id number]                                  [] Portable Machine #3/RO serial # [id number]                                                                                                       Alarm Test:  Pass time 0802         [x] RO/Machine Log Complete    Machine Temp    36-37*C             Dialysate: pH  7.4    Conductivity: Meter 14.0    HD Machine  13.8    TCD: 13.7  Dialyzer Lot # [id number]    Blood Tubing Lot # [id number]    Saline Lot #[id number]     CHLORINE TESTING-Before each treatment and every 4 hours    Total Chlorine: [x] less than 0.1 ppm  Initial Time Check: 0832     4 Hr/2nd Check Time: 4041   (if greater than 0.1 ppm from Primary then every 30 minutes from Secondary)     TREATMENT INITIATION - with Dialysis Precautions:   [x] All Connections Secured              [x] Saline Line Double Clamped   [x] Venous Parameters Set               [x] Arterial Parameters Set    [x] Prime Given 250ml NSS              [x]Air Foam Detector Engaged                   Medication    Dose    Volume Route      Time       Thony Nurse      HD  Steven Trevizo RN      HD         HD                  Post Assessment  Dialyzer Cleared:   [] Good  [x] Fair  [] Poor  Blood processed:  52.0 L  UF Removed:  2500 mL    Post BP: 161/80  Pulse: 72  Respirations: 18   Temp: 98.6  [x] Temporal  [] Ax  [] Esophageal   Lungs: [] Clear                [x] No change from initial assessment   Post Tx Vascular Access: [x] N/A  AVF/AVG: Bleeding stopped with  Arterial Pressure for   min   Venous Pressure for   min      Cardiac:  [x] Regular   [] Irregular   Rhythm:  [] Monitored   [] Not Monitored    CVC Catheter: [] N/A  Locking solution: Heparin 1:1000 U  Arterial port 1.9 ml   Venous port 1.9 ml   Edema:  [] None  [x] Generalized                     Skin:[x] Warm  [x] Dry [] Diaphoretic               [] Flushed  [] Pale [] Cyanotic Pain:  [x]0  []1-2  []3-4  []5-6   []7-8  []9-10 Post Treatment Note:   See above note             POST TREATMENT PRIMARY NURSE HANDOFF REPORT:   Post Dialysis: KAITLIN Rodriguez RN        Time:  1421         Abbreviations: AVG-arterial venous graft, AVF-arterial venous fistula, IJ-Internal Jugular, Subcl-Subclavian, Fem-Femoral, Tx-treatment, AP/HR-apical heart rate, VSS- Vital Signs Stable, CVC- Central Venous Catheter, DFR-dialysate flow rate, BFR-blood flow rate, AP-arterial pressure, -venous pressure, UF-ultrafiltrate, TMP-transmembrane pressure, Evelio-Venous, Art-Arterial, RO-Reverse Osmosis

## 2023-01-21 NOTE — PROGRESS NOTES
POD# 3    Admit Date: 1/13/2023    Assessment    Charlie Castillo is a 81 y.o. female POD 3 exploratory laparotomy, sigmoid colectomy, end colostomy    Patient Active Problem List   Diagnosis Code    Cardiomy[facility], [facility] I42.0    Environmental allergies Z91.09    Essential hypertension I10    Iron deficiency anemia D50.9    Decreased GFR R94.4    Dyslipidemia, goal LDL below 70 E78.5    Former smoker Z87.891    Osteoarthritis of both knees M17.0    Chronic pain of both knees M25.561, M25.562, G89.29    Chronic pain syndrome G89.4    Primary osteoarthritis of both knees M17.0    Chronic obstructive pulmonary disease (HCC) J44.9    Compliance with medication regimen Z91.89    Bilateral shoulder pain M25.511, M25.512    Elevated alkaline phosphatase level R74.8    Caregiver stress Z63.6    STEMI (ST elevation myocardial infarction) ([facility]) I21.3    Acute pulmonary edema ([facility]) J81.0    Coronary artery disease involving native coronary artery of native heart without angina pectoris I25.10    Pulmonary edema J81.1    SOB (shortness of breath) R06.02    Coronary artery disease involving native coronary artery with unstable angina pectoris ([facility]) I25.110    Type 2 diabetes mellitus with hyperglycemia, with long-term current use of insulin ([facility]) E11.65, Z79.4    Severe obesity (BMI 35.0-39. 9) with comorbidity ([facility]) E66.01    Type 2 diabetes mellitus with hyperglycemia ([facility]) E11.65    TESSA on CPAP G47.33, Z99.89    Ischemic cardiomyopathy I25.5    Syncope R55    Flash pulmonary edema ([facility]) J81.0    Elevated d-dimer R79.89    Acute respiratory failure with hypoxia ([facility]) J96.01    Pulmonary edema cardiac cause ([facility]) I50.1    COPD with acute exacerbation ([facility]) J44.1    Respiratory failure requiring intubation ([facility]) J96.90    CAD (coronary artery disease) I25.10    Chronic systolic congestive heart failure ([facility]) I50.22    Nonrheumatic mitral valve regurgitation I34.0    Sepsis ([facility]) A41.9    Hypotension I95.9    Stage 1 acute kidney injury ([facility] N17.9    Hyperkalemia E87.5    Acute hyperkalemia E87.5    Breast cancer ([facility] C50.919    Rectal cancer (HCC) C20    History of myocardial infarction I25.2    Pulmonary hypertension (HCC) I27.20    Acute exacerbation of CHF (congestive heart failure) ([facility]) I50.9    Pneumonia due to COVID-19 virus U07.1, J12.82    Acute on chronic diastolic heart failure (HCC) I50.33    Stage 3b chronic kidney disease (HCC) N18.32    CHF (congestive heart failure) ([facility]) I50.9    CVA (cerebral vascular accident) ([facility] I63.9    ESRD (end stage renal disease) on dialysis ([facility] N18.6, Z99.2    TIA (transient ischemic attack) G45.9    Dizziness R42    Stenosis of both vertebral arteries I65.03    Sinus tachycardia R00.0    Chronic heart failure with preserved ejection fraction ([facility]) I50.32    Anemia due to chronic kidney disease, on chronic dialysis ([facility]) N18.6, D63.1, Z99.2       Plan  -continue with clears until stoma begins to function  -no plan to revise stoma -patient was instructed to continue to avoid laying on her side which she agrees with     Subjective    Overnight events: No acute events overnight. Patient was seen in dialysis. Reports abdominal pain is controlled and no nausea or vomiting    Review of Systems   Constitutional:  Negative for fatigue and fever. Gastrointestinal:  Positive for abdominal pain. Objective    Physical Exam:  Visit Vitals  /83 (BP 1 Location: Left upper arm, BP Patient Position: At rest;Lying right side)   Pulse 71   Temp 98 °F (36.7 °C)   Resp 20   Ht 5' 4\" (1.626 m)   Wt 99 kg (218 lb 4.1 oz)   SpO2 98%   Breastfeeding Unknown   BMI 37.46 kg/m²       Intake/Output Summary (Last 24 hours) at 1/21/2023 0941  Last data filed at 1/20/2023 1806  Gross per 24 hour   Intake --   Output 700 ml   Net -700 ml     Physical Exam  Constitutional:       Appearance: She is obese. Cardiovascular:      Rate and Rhythm: Normal rate. Abdominal:      Palpations: Abdomen is soft. Comments: Appropriately tender, stoma ischemic but minimal bloody drainage today, no flatus or stool , midline dressing clean, dry, intact   Neurological:      Mental Status: She is alert.              Marcio Galvan DO  Phone: [phone]

## 2023-01-21 NOTE — PROGRESS NOTES
RENAL DAILY PROGRESS NOTE              Subjective:       Complaint:   Overnight events noted  no nausea, vomiting, chest pain  Breathing is better      IMPRESSION:   IMPRESSION:   ESRD TTS  Access: TDC,maturing av fistula on right  arm(placed 3 weeks ago-awaiting rpt procedure for superfilization)   Abdominal perforation due to diverticulitis. s/p procedure and colostomy  COPD excerebration. CAD,cardiomyopathy  Secondary hyperparathyroidism  ACD   PLAN:   Will keep her on schedule with dialysis. C/w epogen  Diet per surgery  Avoid Gadolinium due to its association with nephrogenic systemic fibrosis in a patients with severe ARF and ESRD. Please dose all medications for creatinine clearance <15/dialysis. Avoid blood pressure checks, blood draws, peripheral iv's on arm with access.                  Current Facility-Administered Medications   Medication Dose Route Frequency    heparin (porcine) 1,000 unit/mL injection 3,800 Units  3,800 Units Hemodialysis DIALYSIS TUE, THU & SAT    hydrocortisone Sod Succ (PF) (SOLU-CORTEF) injection 100 mg  100 mg IntraVENous Q12H    tiotropium-olodateroL (STIOLTO RESPIMAT) 2.5-2.5 mcg/actuation inhaler 2 Puff  2 Puff Inhalation DAILY    albuterol (ACCUNEB) nebulizer solution 1.25 mg  1.25 mg Nebulization Q6H PRN    oxyCODONE-acetaminophen (PERCOCET) 5-325 mg per tablet 1 Tablet  1 Tablet Oral Q4H PRN    traMADoL (ULTRAM) tablet 50 mg  50 mg Oral Q6H PRN    epoetin tonny-epbx (RETACRIT) injection 4,000 Units  4,000 Units SubCUTAneous Q TUE, THU & SAT    naloxone (NARCAN) injection 0.4 mg  0.4 mg IntraVENous PRN    pantoprazole (PROTONIX) 40 mg in 0.9% sodium chloride 10 mL injection  40 mg IntraVENous Q12H    piperacillin-tazobactam (ZOSYN) 3.375 g in 0.9% sodium chloride (MBP/ADV) 100 mL MBP  3.375 g IntraVENous Q12H    HYDROmorphone (DILAUDID) injection 1 mg  1 mg IntraVENous Q3H PRN    sodium chloride (OCEAN) 0.65 % nasal squeeze bottle 2 Spray  2 Spray Both Nostrils Q2H PRN    [Held by provider] melatonin tablet 5 mg  5 mg Oral QHS    heparin (porcine) injection 5,000 Units  5,000 Units SubCUTAneous Q8H    aspirin chewable tablet 162 mg  162 mg Oral DAILY    atorvastatin (LIPITOR) tablet 80 mg  80 mg Oral QHS    ezetimibe (ZETIA) tablet 10 mg  10 mg Oral DAILY    [Held by provider] gabapentin (NEURONTIN) capsule 400 mg  400 mg Oral DAILY    sodium chloride (NS) flush 5-40 mL  5-40 mL IntraVENous Q8H    sodium chloride (NS) flush 5-40 mL  5-40 mL IntraVENous PRN    acetaminophen (TYLENOL) tablet 650 mg  650 mg Oral Q6H PRN    Or    acetaminophen (TYLENOL) suppository 650 mg  650 mg Rectal Q6H PRN    polyethylene glycol (MIRALAX) packet 17 g  17 g Oral DAILY PRN    bisacodyL (DULCOLAX) suppository 10 mg  10 mg Rectal DAILY PRN    ondansetron (ZOFRAN ODT) tablet 4 mg  4 mg Oral Q8H PRN    Or    ondansetron (ZOFRAN) injection 4 mg  4 mg IntraVENous Q6H PRN    metoprolol tartrate (LOPRESSOR) tablet 25 mg  25 mg Oral BID    insulin lispro (HUMALOG) injection   SubCUTAneous AC&HS    glucose chewable tablet 16 g  4 Tablet Oral PRN    glucagon (GLUCAGEN) injection 1 mg  1 mg IntraMUSCular PRN    dextrose 10% infusion 0-250 mL  0-250 mL IntraVENous PRN    [Held by provider] insulin glargine (LANTUS) injection 5 Units  5 Units SubCUTAneous QHS    guaiFENesin ER (MUCINEX) tablet 600 mg  600 mg Oral Q12H       Review of Symptoms: comprehensive ROS negative except above.    Objective:   Patient Vitals for the past 24 hrs:   Temp Pulse Resp BP SpO2   01/21/23 1303 98.1 °F (36.7 °C) 84 18 (!) 169/81 100 %   01/21/23 1224 98.6 °F (37 °C) 72 18 (!) 161/80 --   01/21/23 1213 -- 77 18 (!) 160/70 --   01/21/23 1200 -- 66 18 (!) 165/72 --   01/21/23 1145 -- 68 18 (!) 164/72 --   01/21/23 1130 -- 67 18 (!) 156/74 --   01/21/23 1115 -- 66 18 (!) 153/80 --   01/21/23 1100 -- 68 18 (!) 148/68 --   01/21/23 1045 -- 66 18 (!) 150/77 --   01/21/23 1030 -- 66 18 (!) 148/72 --   01/21/23 1015 -- 66 18 (!) 142/69 --   01/21/23 1000 -- 68 18 (!) 153/73 --   01/21/23 0945 -- 70 18 (!) 161/70 --   01/21/23 0930 -- 75 18 (!) 160/76 --   01/21/23 0915 -- 61 18 (!) 172/69 --   01/21/23 0910 98.4 °F (36.9 °C) 78 18 (!) 160/79 --   01/21/23 0452 98 °F (36.7 °C) 71 20 129/83 98 %   01/21/23 0000 98.2 °F (36.8 °C) 70 20 (!) 147/88 99 %   01/20/23 2032 98.6 °F (37 °C) 81 19 (!) 156/85 100 %   01/20/23 1524 98 °F (36.7 °C) 83 20 (!) 140/69 99 %          Weight change:      01/19 1901 - 01/21 0700  In: -   Out: 700 [Urine:700]    Intake/Output Summary (Last 24 hours) at 1/21/2023 1442  Last data filed at 1/21/2023 1213  Gross per 24 hour   Intake --   Output 2800 ml   Net -2800 ml       Physical Exam:   General: comfortable, no acute distress   HEENT sclera anicteric, supple neck, no thyromegaly  CVS: S1S2 heard,  no rub  RS: + air entry b/l  Abd: Soft,colostomy  Neuro: non focal, awake, alert , CN II-XII are grossly intact  Extrm: no edema, no cyanosis, clubbing   Skin: no visible  Rash  Musculoskeletal: No gross joints or bone deformities   Access: maturing right arm access      Data Review:     LABS:   Hematology:   Recent Labs     01/21/23  0145 01/20/23  0610   WBC 9.7 9.6   HGB 7.9* 9.2*   HCT 26.1* 30.4*       Chemistry:   Recent Labs     01/21/23  0145 01/20/23  0610 01/19/23  0235   BUN 70* 63* 69*   CREA 3.73* 3.70* 2.81*   CA 8.6 9.4 9.3   ALB 2.5* 2.7* 3.1*   K 4.7 5.2 5.9*    139 136    105 105   CO2 25 24 22   PHOS 6.0* 7.3* 7.6*   * 164* 165*              Procedures/imaging: see electronic medical records for all procedures, Xrays and details which were not copied into this note but were reviewed prior to creation of Plan          Assessment & Plan:       See above      Yoselin Barrett MD  1/21/2023

## 2023-01-21 NOTE — PROGRESS NOTES
Approx 0040, after assisting pt to [facility] to urinate, pt c/o 10/10 pain in entire lower ABD. Pt medicated per MAR with Dilaudid 1mg IV push. Approx 0520, pt c/o 10/10 pain in entire lower ABD. Pt medicated per MAR with Dilaudid 1mg IV push.

## 2023-01-21 NOTE — PROGRESS NOTES
[facility] Hospitalist Group  Progress Note    Patient: Eligio Haq Age: 81 y.o. : 1950 MR#: [id number] SSN: [id number]  Date/Time: 2023    Subjective:     Patient is sitting in bed in no apparent distress, awake, follows simple commands. Assessment/Plan:    81year old female w/ h/o ESRD, COPD not on home 02, HFpEF admitted to the ED after presenting with c/o severe SOB and wheezing.    -Abdominal pain, pneumoperitoneum, diverticulitis on CT scan    -COPD w/ acute exacerbation: on IV steroid, bronchodilators, on supplemental 02. Has ambulatory hypoxia. HISTORY OF:  -HTN  -CAD  -HFpEF: last echo in 2022-->EF: 50-55%  -Type 2DM:   -CAD    PLAN:  Status post laparotomy and sigmoid colectomy and colostomy  Diet as per surgeon. Continue antibiotics, ID is following  Hemodialysis per nephrology  On stress dose steroids  -Cont bronchodilators, 02 support  -PT and OT are recommending SNF. I discussed PT OT recommendations with the patient.       Dispo: PT is recommending SNF/rehab  Additional Notes:      Case discussed with:  [x]Patient  []Family  []Nursing  []Case Management  DVT Prophylaxis:  []Lovenox  [x]Hep SQ  []SCDs  []Coumadin   []On Heparin gtt    Objective:   VS: Visit Vitals  BP (!) 151/79 (BP 1 Location: Left upper arm, BP Patient Position: Sitting)   Pulse 84   Temp 98.1 °F (36.7 °C)   Resp 18   Ht 5' 4\" (1.626 m)   Wt 99 kg (218 lb 4.1 oz)   SpO2 97%   Breastfeeding Unknown   BMI 37.46 kg/m²      Tmax/24hrs: Temp (24hrs), Av.3 °F (36.8 °C), Min:98 °F (36.7 °C), Max:98.6 °F (37 °C)    Input/Output:   Intake/Output Summary (Last 24 hours) at 2023 1804  Last data filed at 2023 1303  Gross per 24 hour   Intake 600 ml   Output 2800 ml   Net -2200 ml         General:  Awake, follows simple commands  Cardiovascular:  S1S2+, RRR  Pulmonary: Coarse breath sounds bilaterally  GI:  Soft, BS+, NT, ND, has colostomy  Extremities:  trace edema      Labs: Recent Results (from the past 24 hour(s))   GLUCOSE, POC    Collection Time: 01/20/23  9:57 PM   Result Value Ref Range    Glucose (POC) 172 (H) 70 - 110 mg/dL   RENAL FUNCTION PANEL    Collection Time: 01/21/23  1:45 AM   Result Value Ref Range    Sodium 136 136 - 145 mmol/L    Potassium 4.7 3.5 - 5.5 mmol/L    Chloride 106 100 - 111 mmol/L    CO2 25 21 - 32 mmol/L    Anion gap 5 3.0 - 18 mmol/L    Glucose 145 (H) 74 - 99 mg/dL    BUN 70 (H) 7.0 - 18 MG/DL    Creatinine 3.73 (H) 0.6 - 1.3 MG/DL    BUN/Creatinine ratio 19 12 - 20      eGFR 12 (L) >60 ml/min/1.73m2    Calcium 8.6 8.5 - 10.1 MG/DL    Phosphorus 6.0 (H) 2.5 - 4.9 MG/DL    Albumin 2.5 (L) 3.4 - 5.0 g/dL   CBC WITH AUTOMATED DIFF    Collection Time: 01/21/23  1:45 AM   Result Value Ref Range    WBC 9.7 4.6 - 13.2 K/uL    RBC 2.81 (L) 4.20 - 5.30 M/uL    HGB 7.9 (L) 12.0 - 16.0 g/dL    HCT 26.1 (L) 35.0 - 45.0 %    MCV 92.9 78.0 - 100.0 FL    MCH 28.1 24.0 - 34.0 PG    MCHC 30.3 (L) 31.0 - 37.0 g/dL    RDW 18.3 (H) 11.6 - 14.5 %    PLATELET 749 177 - 393 K/uL    MPV 10.0 9.2 - 11.8 FL    NRBC 2.5 (H) 0  WBC    ABSOLUTE NRBC 0.24 (H) 0.00 - 0.01 K/uL    NEUTROPHILS 81 (H) 40 - 73 %    LYMPHOCYTES 6 (L) 21 - 52 %    MONOCYTES 10 3 - 10 %    EOSINOPHILS 0 0 - 5 %    BASOPHILS 0 0 - 2 %    IMMATURE GRANULOCYTES 2 (H) 0.0 - 0.5 %    ABS. NEUTROPHILS 7.8 1.8 - 8.0 K/UL    ABS. LYMPHOCYTES 0.6 (L) 0.9 - 3.6 K/UL    ABS. MONOCYTES 1.0 0.05 - 1.2 K/UL    ABS. EOSINOPHILS 0.0 0.0 - 0.4 K/UL    ABS. BASOPHILS 0.0 0.0 - 0.1 K/UL    ABS. IMM.  GRANS. 0.2 (H) 0.00 - 0.04 K/UL    DF AUTOMATED     MAGNESIUM    Collection Time: 01/21/23  1:45 AM   Result Value Ref Range    Magnesium 2.3 1.6 - 2.6 mg/dL   GLUCOSE, POC    Collection Time: 01/21/23  7:59 AM   Result Value Ref Range    Glucose (POC) 158 (H) 70 - 110 mg/dL   GLUCOSE, POC    Collection Time: 01/21/23  1:00 PM   Result Value Ref Range    Glucose (POC) 124 (H) 70 - 110 mg/dL   GLUCOSE, POC    Collection Time: 01/21/23  4:44 PM   Result Value Ref Range    Glucose (POC) 144 (H) 70 - 110 mg/dL     Additional Data Reviewed:      Dragon medical dictation software was used for portions of this report. Unintended errors may occur.       Signed By: Anabel Perez MD     January 21, 2023

## 2023-01-21 NOTE — PROGRESS NOTES
Problem: Falls - Risk of  Goal: *Absence of Falls  Description: Document Gloria Caputo Fall Risk and appropriate interventions in the flowsheet.   Outcome: Progressing Towards Goal  Note: Fall Risk Interventions:            Medication Interventions: Assess postural VS orthostatic hypotension, Evaluate medications/consider consulting pharmacy, Patient to call before getting OOB, Teach patient to arise slowly         History of Falls Interventions: Door open when patient unattended, Evaluate medications/consider consulting pharmacy, Room close to nurse's station         Problem: Patient Education: Go to Patient Education Activity  Goal: Patient/Family Education  Outcome: Progressing Towards Goal     Problem: Patient Education: Go to Patient Education Activity  Goal: Patient/Family Education  Outcome: Progressing Towards Goal     Problem: Nutrition Deficit  Goal: *Optimize nutritional status  Outcome: Progressing Towards Goal     Problem: Chronic Renal Failure  Goal: *Fluid and electrolytes stabilized  Outcome: Progressing Towards Goal     Problem: Patient Education: Go to Patient Education Activity  Goal: Patient/Family Education  Outcome: Progressing Towards Goal

## 2023-01-21 NOTE — PROGRESS NOTES
Upon rounding pt, it was noted that pt's stoma is a dark purple, almost black color. Colostomy pouch has very minimal output in it (approx 5-10mL), what is in the pouch is a burgundy color, there is also large area of bruising to ABD near new colostomy. Midline incision also noted to have some areas of dehiscence. Pt stated that her entire ABD is tender, more so at midline incision. General Surgeon on call notified.  Per surgeon, this RN to place a clean dressing over midline incision and she will assess pt in the AM.

## 2023-01-21 NOTE — PROGRESS NOTES
Problem: Falls - Risk of  Goal: *Absence of Falls  Description: Document Barnhart Reason Fall Risk and appropriate interventions in the flowsheet.   Outcome: Progressing Towards Goal  Note: Fall Risk Interventions:            Medication Interventions: Assess postural VS orthostatic hypotension, Evaluate medications/consider consulting pharmacy, Patient to call before getting OOB, Teach patient to arise slowly         History of Falls Interventions: Door open when patient unattended, Evaluate medications/consider consulting pharmacy, Room close to nurse's station         Problem: Nutrition Deficit  Goal: *Optimize nutritional status  Outcome: Progressing Towards Goal     Problem: Chronic Renal Failure  Goal: *Fluid and electrolytes stabilized  Outcome: Progressing Towards Goal

## 2023-01-21 NOTE — PROGRESS NOTES
Bedside shift change report given to [facility] (oncoming nurse) by Alesha Beauchamp (offgoing nurse). Report included the following information SBAR, Kardex, Intake/Output, MAR, and Recent Results.    Wound Prevention Checklist    Patient: Neysa Meckel (5 y.o. female)  Date: 1/21/2023  Diagnosis: COPD exacerbation ([facility] [J44.1] COPD with acute exacerbation ([facility]    Precautions: Fall       []  Heel prevention boots placed on patient    []  Patient turned q2h during shift    []  Lift team ordered    []  Patient on Kelly bed/Specialty bed    [x]  Each Wound is documented during shift (Stage, Color, drainage, odor, measurements, and dressings)    [x]  Dual skin check done with Elvie Luu RN

## 2023-01-22 LAB
ALBUMIN SERPL-MCNC: 2.8 G/DL (ref 3.4–5)
ANION GAP SERPL CALC-SCNC: 5 MMOL/L (ref 3–18)
BUN SERPL-MCNC: 46 MG/DL (ref 7–18)
BUN/CREAT SERPL: 17 (ref 12–20)
CALCIUM SERPL-MCNC: 8.8 MG/DL (ref 8.5–10.1)
CHLORIDE SERPL-SCNC: 100 MMOL/L (ref 100–111)
CO2 SERPL-SCNC: 29 MMOL/L (ref 21–32)
CREAT SERPL-MCNC: 2.64 MG/DL (ref 0.6–1.3)
GLUCOSE BLD STRIP.AUTO-MCNC: 142 MG/DL (ref 70–110)
GLUCOSE BLD STRIP.AUTO-MCNC: 157 MG/DL (ref 70–110)
GLUCOSE BLD STRIP.AUTO-MCNC: 171 MG/DL (ref 70–110)
GLUCOSE BLD STRIP.AUTO-MCNC: 194 MG/DL (ref 70–110)
GLUCOSE SERPL-MCNC: 157 MG/DL (ref 74–99)
PHOSPHATE SERPL-MCNC: 4.2 MG/DL (ref 2.5–4.9)
POTASSIUM SERPL-SCNC: 4 MMOL/L (ref 3.5–5.5)
SODIUM SERPL-SCNC: 134 MMOL/L (ref 136–145)

## 2023-01-22 PROCEDURE — 74011250636 HC RX REV CODE- 250/636: Performed by: INTERNAL MEDICINE

## 2023-01-22 PROCEDURE — 82962 GLUCOSE BLOOD TEST: CPT

## 2023-01-22 PROCEDURE — 74011250637 HC RX REV CODE- 250/637: Performed by: SURGERY

## 2023-01-22 PROCEDURE — 74011000250 HC RX REV CODE- 250: Performed by: SURGERY

## 2023-01-22 PROCEDURE — 74011250636 HC RX REV CODE- 250/636: Performed by: EMERGENCY MEDICINE

## 2023-01-22 PROCEDURE — 74011250637 HC RX REV CODE- 250/637: Performed by: HOSPITALIST

## 2023-01-22 PROCEDURE — 65270000029 HC RM PRIVATE

## 2023-01-22 PROCEDURE — C9113 INJ PANTOPRAZOLE SODIUM, VIA: HCPCS | Performed by: EMERGENCY MEDICINE

## 2023-01-22 PROCEDURE — 94761 N-INVAS EAR/PLS OXIMETRY MLT: CPT

## 2023-01-22 PROCEDURE — 74011000258 HC RX REV CODE- 258: Performed by: SURGERY

## 2023-01-22 PROCEDURE — 74011636637 HC RX REV CODE- 636/637: Performed by: SURGERY

## 2023-01-22 PROCEDURE — 74011250637 HC RX REV CODE- 250/637: Performed by: INTERNAL MEDICINE

## 2023-01-22 PROCEDURE — 74011250636 HC RX REV CODE- 250/636: Performed by: SURGERY

## 2023-01-22 PROCEDURE — 99232 SBSQ HOSP IP/OBS MODERATE 35: CPT | Performed by: EMERGENCY MEDICINE

## 2023-01-22 PROCEDURE — 74011000250 HC RX REV CODE- 250: Performed by: EMERGENCY MEDICINE

## 2023-01-22 PROCEDURE — 74011636637 HC RX REV CODE- 636/637: Performed by: INTERNAL MEDICINE

## 2023-01-22 PROCEDURE — 94640 AIRWAY INHALATION TREATMENT: CPT

## 2023-01-22 PROCEDURE — 36415 COLL VENOUS BLD VENIPUNCTURE: CPT

## 2023-01-22 PROCEDURE — 80069 RENAL FUNCTION PANEL: CPT

## 2023-01-22 PROCEDURE — 74011250637 HC RX REV CODE- 250/637: Performed by: PHYSICIAN ASSISTANT

## 2023-01-22 RX ORDER — DOCUSATE SODIUM 100 MG/1
100 CAPSULE, LIQUID FILLED ORAL 2 TIMES DAILY
Status: DISCONTINUED | OUTPATIENT
Start: 2023-01-22 | End: 2023-01-25 | Stop reason: HOSPADM

## 2023-01-22 RX ORDER — PREDNISONE 20 MG/1
20 TABLET ORAL
Status: COMPLETED | OUTPATIENT
Start: 2023-01-22 | End: 2023-01-24

## 2023-01-22 RX ADMIN — HYDROMORPHONE HYDROCHLORIDE 1 MG: 1 INJECTION, SOLUTION INTRAMUSCULAR; INTRAVENOUS; SUBCUTANEOUS at 03:33

## 2023-01-22 RX ADMIN — PIPERACILLIN AND TAZOBACTAM 3.38 G: 3; .375 INJECTION, POWDER, FOR SOLUTION INTRAVENOUS at 09:10

## 2023-01-22 RX ADMIN — PIPERACILLIN AND TAZOBACTAM 3.38 G: 3; .375 INJECTION, POWDER, FOR SOLUTION INTRAVENOUS at 19:39

## 2023-01-22 RX ADMIN — EZETIMIBE 10 MG: 10 TABLET ORAL at 09:05

## 2023-01-22 RX ADMIN — SODIUM CHLORIDE, PRESERVATIVE FREE 10 ML: 5 INJECTION INTRAVENOUS at 06:25

## 2023-01-22 RX ADMIN — HYDROMORPHONE HYDROCHLORIDE 1 MG: 1 INJECTION, SOLUTION INTRAMUSCULAR; INTRAVENOUS; SUBCUTANEOUS at 19:32

## 2023-01-22 RX ADMIN — Medication 3 UNITS: at 11:30

## 2023-01-22 RX ADMIN — ASPIRIN 81 MG CHEWABLE TABLET 162 MG: 81 TABLET CHEWABLE at 09:05

## 2023-01-22 RX ADMIN — GUAIFENESIN 600 MG: 600 TABLET, EXTENDED RELEASE ORAL at 21:18

## 2023-01-22 RX ADMIN — ATORVASTATIN CALCIUM 80 MG: 40 TABLET, FILM COATED ORAL at 21:17

## 2023-01-22 RX ADMIN — DOCUSATE SODIUM 100 MG: 100 CAPSULE, LIQUID FILLED ORAL at 17:52

## 2023-01-22 RX ADMIN — PANTOPRAZOLE SODIUM 40 MG: 40 INJECTION, POWDER, FOR SOLUTION INTRAVENOUS at 09:10

## 2023-01-22 RX ADMIN — PREDNISONE 20 MG: 20 TABLET ORAL at 09:05

## 2023-01-22 RX ADMIN — SODIUM CHLORIDE, PRESERVATIVE FREE 10 ML: 5 INJECTION INTRAVENOUS at 21:19

## 2023-01-22 RX ADMIN — HYDROCORTISONE SODIUM SUCCINATE 100 MG: 100 INJECTION, POWDER, FOR SOLUTION INTRAMUSCULAR; INTRAVENOUS at 06:20

## 2023-01-22 RX ADMIN — Medication 3 UNITS: at 09:04

## 2023-01-22 RX ADMIN — PANTOPRAZOLE SODIUM 40 MG: 40 INJECTION, POWDER, FOR SOLUTION INTRAVENOUS at 21:19

## 2023-01-22 RX ADMIN — GUAIFENESIN 600 MG: 600 TABLET, EXTENDED RELEASE ORAL at 09:05

## 2023-01-22 RX ADMIN — HYDROMORPHONE HYDROCHLORIDE 1 MG: 1 INJECTION, SOLUTION INTRAMUSCULAR; INTRAVENOUS; SUBCUTANEOUS at 07:27

## 2023-01-22 RX ADMIN — TIOTROPIUM BROMIDE AND OLODATEROL 2 PUFF: 3.124; 2.736 SPRAY, METERED RESPIRATORY (INHALATION) at 08:51

## 2023-01-22 RX ADMIN — METOPROLOL TARTRATE 25 MG: 25 TABLET, FILM COATED ORAL at 09:05

## 2023-01-22 RX ADMIN — METOPROLOL TARTRATE 25 MG: 25 TABLET, FILM COATED ORAL at 17:52

## 2023-01-22 RX ADMIN — HYDROMORPHONE HYDROCHLORIDE 1 MG: 1 INJECTION, SOLUTION INTRAMUSCULAR; INTRAVENOUS; SUBCUTANEOUS at 15:35

## 2023-01-22 RX ADMIN — HYDROMORPHONE HYDROCHLORIDE 1 MG: 1 INJECTION, SOLUTION INTRAMUSCULAR; INTRAVENOUS; SUBCUTANEOUS at 00:35

## 2023-01-22 RX ADMIN — Medication 3 UNITS: at 17:52

## 2023-01-22 RX ADMIN — HYDROMORPHONE HYDROCHLORIDE 1 MG: 1 INJECTION, SOLUTION INTRAMUSCULAR; INTRAVENOUS; SUBCUTANEOUS at 12:04

## 2023-01-22 RX ADMIN — DOCUSATE SODIUM 100 MG: 100 CAPSULE, LIQUID FILLED ORAL at 12:01

## 2023-01-22 NOTE — PROGRESS NOTES
POD# 4    Admit Date: 1/13/2023    Assessment    Tata Hope is a 81 y.o. female POD 4 exploratory laparotomy, sigmoid colectomy, end colostomy    Patient Active Problem List   Diagnosis Code    [facility] I42.0    Environmental allergies Z91.09    Essential hypertension I10    Iron deficiency anemia D50.9    Decreased GFR R94.4    Dyslipidemia, goal LDL below 70 E78.5    Former smoker Z87.891    Osteoarthritis of both knees M17.0    Chronic pain of both knees M25.561, M25.562, G89.29    Chronic pain syndrome G89.4    Primary osteoarthritis of both knees M17.0    Chronic obstructive pulmonary disease (HCC) J44.9    Compliance with medication regimen Z91.89    Bilateral shoulder pain M25.511, M25.512    Elevated alkaline phosphatase level R74.8    Caregiver stress Z63.6    STEMI (ST elevation myocardial infarction) ([facility]) I21.3    Acute pulmonary edema ([facility]) J81.0    Coronary artery disease involving native coronary artery of native heart without angina pectoris I25.10    Pulmonary edema J81.1    SOB (shortness of breath) R06.02    Coronary artery disease involving native coronary artery with unstable angina pectoris ([facility]) I25.110    Type 2 diabetes mellitus with hyperglycemia, with long-term current use of insulin ([facility]) E11.65, Z79.4    Severe obesity (BMI 35.0-39. 9) with comorbidity ([facility]) E66.01    Type 2 diabetes mellitus with hyperglycemia ([facility]) E11.65    TESSA on CPAP G47.33, Z99.89    Ischemic cardiomyopathy I25.5    Syncope R55    Flash pulmonary edema ([facility]) J81.0    Elevated d-dimer R79.89    Acute respiratory failure with hypoxia ([facility]) J96.01    Pulmonary edema cardiac cause ([facility]) I50.1    COPD with acute exacerbation ([facility]) J44.1    Respiratory failure requiring intubation ([facility]) J96.90    CAD (coronary artery disease) I25.10    Chronic systolic congestive heart failure ([facility]) I50.22    Nonrheumatic mitral valve regurgitation I34.0    Sepsis ([facility]) A41.9    Hypotension I95.9    Stage 1 acute kidney injury ([facility] N17.9    Hyperkalemia E87.5    Acute hyperkalemia E87.5    Breast cancer ([facility] C50.919    Rectal cancer (HCC) C20    History of myocardial infarction I25.2    Pulmonary hypertension (HCC) I27.20    Acute exacerbation of CHF (congestive heart failure) ([facility]) I50.9    Pneumonia due to COVID-19 virus U07.1, J12.82    Acute on chronic diastolic heart failure (HCC) I50.33    Stage 3b chronic kidney disease ([facility]) N18.32    CHF (congestive heart failure) ([facility]) I50.9    CVA (cerebral vascular accident) ([facility] I63.9    ESRD (end stage renal disease) [facility] N18.6, Z99.2    TIA (transient ischemic attack) G45.9    Dizziness R42    Stenosis of both vertebral arteries I65.03    Sinus tachycardia R00.0    Chronic heart failure with preserved ejection fraction ([facility]) I50.32    Anemia due to chronic kidney disease, on chronic dialysis ([facility]) N18.6, D63.1, Z99.2       Plan  -continue clears, awaiting stoma function. Stoma will recover over time  -pain well controlled  -continue ambulation and ISB     Subjective    Overnight events: No acute events overnight. Tolerating clears without nausea or vomiting. Sitting on edge of bed comfortable    Objective    Physical Exam:   Visit Vitals  BP (!) 159/78   Pulse 72   Temp 98.1 °F (36.7 °C)   Resp 20   Ht 5' 4\" (1.626 m)   Wt 95.8 kg (211 lb 3.2 oz)   SpO2 96%   Breastfeeding Unknown   BMI 36.25 kg/m²       Intake/Output Summary (Last 24 hours) at 1/22/2023 0845  Last data filed at 1/22/2023 0035  Gross per 24 hour   Intake 720 ml   Output 2800 ml   Net -2080 ml     Physical Exam  Constitutional:       Appearance: She is obese.    Abdominal:      Comments: Appropriately tender, stoma ischemic but minimal bloody drainage today, no flatus or stool , midline dressing clean, dry, intact              Brian Rivas DO  Phone: [phone]

## 2023-01-22 NOTE — PROGRESS NOTES
[facility] Hospitalist Group  Progress Note    Patient: Lesvia Lugo Age: 81 y.o. : 1950 MR#: [id number] SSN: [id number]  Date/Time: 2023    Subjective:     Patient is sitting in bed in no apparent distress, awake, follows commands. Tolerating clear liquid    Assessment/Plan:    81year old female w/ h/o ESRD, COPD not on home 02, HFpEF admitted to the ED after presenting with c/o severe SOB and wheezing.    -Abdominal pain, pneumoperitoneum, diverticulitis on CT scan    -COPD w/ acute exacerbation: on IV steroid, bronchodilators, on supplemental 02. Has ambulatory hypoxia. HISTORY OF:  -HTN  -CAD  -HFpEF: last echo in 2022-->EF: 50-55%  -Type 2DM:   -CAD    PLAN:  Status post laparotomy and sigmoid colectomy and colostomy  Clear liquid diet. Surgery is following  Continue antibiotics, ID is following  Hemodialysis per nephrology  Taper steroids  -Cont bronchodilators, 02 support  -PT and OT are recommending SNF. Dispo: PT is recommending SNF/rehab  Additional Notes:      Case discussed with:  [x]Patient  []Family  []Nursing  []Case Management  DVT Prophylaxis:  []Lovenox  [x]Hep SQ  [x]SCDs  []Coumadin   []On Heparin gtt    Objective:   VS: Visit Vitals  BP (!) 117/90   Pulse 88   Temp 98.5 °F (36.9 °C)   Resp 20   Ht 5' 4\" (1.626 m)   Wt 95.8 kg (211 lb 3.2 oz)   SpO2 98%   Breastfeeding Unknown   BMI 36.25 kg/m²      Tmax/24hrs: Temp (24hrs), Av °F (36.7 °C), Min:97.5 °F (36.4 °C), Max:98.5 °F (36.9 °C)    Input/Output:   Intake/Output Summary (Last 24 hours) at 2023 1244  Last data filed at 2023 0035  Gross per 24 hour   Intake 720 ml   Output 300 ml   Net 420 ml         General:  Awake, alert  Cardiovascular:  S1S2+, RRR  Pulmonary: Coarse breath sounds bilaterally  GI:  Soft, BS+, NT, ND, has colostomy with some bloody drainage.   No stool yet  Extremities:  trace edema        Labs:    Recent Results (from the past 24 hour(s))   GLUCOSE, POC Collection Time: 01/21/23  1:00 PM   Result Value Ref Range    Glucose (POC) 124 (H) 70 - 110 mg/dL   GLUCOSE, POC    Collection Time: 01/21/23  4:44 PM   Result Value Ref Range    Glucose (POC) 144 (H) 70 - 110 mg/dL   GLUCOSE, POC    Collection Time: 01/21/23  9:50 PM   Result Value Ref Range    Glucose (POC) 189 (H) 70 - 110 mg/dL   RENAL FUNCTION PANEL    Collection Time: 01/22/23  3:54 AM   Result Value Ref Range    Sodium 134 (L) 136 - 145 mmol/L    Potassium 4.0 3.5 - 5.5 mmol/L    Chloride 100 100 - 111 mmol/L    CO2 29 21 - 32 mmol/L    Anion gap 5 3.0 - 18 mmol/L    Glucose 157 (H) 74 - 99 mg/dL    BUN 46 (H) 7.0 - 18 MG/DL    Creatinine 2.64 (H) 0.6 - 1.3 MG/DL    BUN/Creatinine ratio 17 12 - 20      eGFR 19 (L) >60 ml/min/1.73m2    Calcium 8.8 8.5 - 10.1 MG/DL    Phosphorus 4.2 2.5 - 4.9 MG/DL    Albumin 2.8 (L) 3.4 - 5.0 g/dL   GLUCOSE, POC    Collection Time: 01/22/23  8:19 AM   Result Value Ref Range    Glucose (POC) 157 (H) 70 - 110 mg/dL   GLUCOSE, POC    Collection Time: 01/22/23 11:18 AM   Result Value Ref Range    Glucose (POC) 171 (H) 70 - 110 mg/dL     Additional Data Reviewed:      Dragon medical dictation software was used for portions of this report. Unintended errors may occur.       Signed By: Wesly Leonard MD     January 22, 2023

## 2023-01-22 NOTE — PROGRESS NOTES
Problem: Falls - Risk of  Goal: *Absence of Falls  Description: Document Rodolfo Galvan Fall Risk and appropriate interventions in the flowsheet.   Outcome: Progressing Towards Goal  Note: Fall Risk Interventions:            Medication Interventions: Assess postural VS orthostatic hypotension, Evaluate medications/consider consulting pharmacy, Patient to call before getting OOB, Teach patient to arise slowly         History of Falls Interventions: Door open when patient unattended, Evaluate medications/consider consulting pharmacy, Room close to nurse's station         Problem: Patient Education: Go to Patient Education Activity  Goal: Patient/Family Education  Outcome: Progressing Towards Goal     Problem: Patient Education: Go to Patient Education Activity  Goal: Patient/Family Education  Outcome: Progressing Towards Goal     Problem: Patient Education: Go to Patient Education Activity  Goal: Patient/Family Education  Outcome: Progressing Towards Goal     Problem: Nutrition Deficit  Goal: *Optimize nutritional status  Outcome: Progressing Towards Goal     Problem: Chronic Renal Failure  Goal: *Fluid and electrolytes stabilized  Outcome: Progressing Towards Goal     Problem: Patient Education: Go to Patient Education Activity  Goal: Patient/Family Education  Outcome: Progressing Towards Goal     Problem: Pain  Goal: *Control of Pain  Outcome: Progressing Towards Goal     Problem: Patient Education: Go to Patient Education Activity  Goal: Patient/Family Education  Outcome: Progressing Towards Goal

## 2023-01-22 NOTE — PROGRESS NOTES
Problem: Falls - Risk of  Goal: *Absence of Falls  Description: Document Leafy Byrnes Fall Risk and appropriate interventions in the flowsheet.   Outcome: Progressing Towards Goal  Note: Fall Risk Interventions:            Medication Interventions: Assess postural VS orthostatic hypotension         History of Falls Interventions: Bed/chair exit alarm, Door open when patient unattended, Utilize gait belt for transfer/ambulation         Problem: Patient Education: Go to Patient Education Activity  Goal: Patient/Family Education  Outcome: Progressing Towards Goal     Problem: Patient Education: Go to Patient Education Activity  Goal: Patient/Family Education  Outcome: Progressing Towards Goal     Problem: Patient Education: Go to Patient Education Activity  Goal: Patient/Family Education  Outcome: Progressing Towards Goal     Problem: Nutrition Deficit  Goal: *Optimize nutritional status  Outcome: Progressing Towards Goal     Problem: Chronic Renal Failure  Goal: *Fluid and electrolytes stabilized  Outcome: Progressing Towards Goal     Problem: Patient Education: Go to Patient Education Activity  Goal: Patient/Family Education  Outcome: Progressing Towards Goal     Problem: Pain  Goal: *Control of Pain  Outcome: Progressing Towards Goal     Problem: Patient Education: Go to Patient Education Activity  Goal: Patient/Family Education  Outcome: Progressing Towards Goal

## 2023-01-22 NOTE — PROGRESS NOTES
[address] [place] [address] [facility]  Pulmonary, Critical Care, and Sleep Medicine    Name: Scot Ray MRN: [id number]   : 1950 Hospital: [address] [place] [address]    Date: 2023        Pulmonary -Critical Care: progress note    Admission Date:   2023  LOS: 9  MAR reviewed and pertinent medications noted or modified as needed    IMPRESSION:   Acute hypoxic respiratory failure - Improving  Wheezing secondary to COPD, component of cardiac asthma with chronic systolic and diastolic heart failure. Wheezing  - COPD-previous imaging with small cystic changes noted on CT scan but PFTs with predominant restrictive lung disease. She does have a history of smoking and secondhand smoke exposure and may have component of  \"cardiac asthma\" in the setting of chronic systolic/diastolic heart failure, favor the latter Echo from 2015 with EF 45% and mild to moderate mitral regurgitation. CXR per my review notes some vascular congestion at bases RIGHT > LEFT  Dysphonia: Persistent Upper airway sounds: Possible soft tissue and/or Vocal Cord Dysfunction  Abdominal pain-new symptoms with CT scan showing possible perforation- now s/p ex lap with sigmoid colectomy end colostomy  Hypertension: CLVH on Echo  CHF: HFpEF: Diastolic dysfunction noted on echo, history of CAD with STEMI in 2018  TESSA  Was previously followed by Dr. Shobha Castro- Lost to follow up and has not been seen for an extended period of time. DM 2  ESRD- HD dependent  Former smoker 46 PY, quit in 2019, + 2nd hand smoke exposure from   Chronic pain syndrome  Morbid Obesity Body mass index is 41.81 kg/m²  H/O COVID Infection . H/O Abnormal CT Chest Imagin2022- diffuse asymmetric GGOs and atelectasis- most likely from pulmonary edema- Resolved on CT Chest 22         RECOMMENDATIONS:     Continue stiolto  Albuterol nebs prn  Change stress dose steroids back to prednisone 20 mg daily for 2 more days.   If upper airway sounds over VC/neck region persist.  Patient also ex-smoker- consider ENT evaluation of VC with NP scope. Patient's upper airway sounds may be due to redundant soft tissue in neck vs vocal cord dysfunction  Maintain aspiration precautions: HOB >30 degrees  SpO2 goal >92%  Trial of QHS BIPAP  Bronchial /oral hygiene; IS at bedside please  Monitor I&Os  Continue antibiotics-extend to abdominal coverage  HD per nephrology  Electrolyte management, prophylaxis and glycemic control per primary and respective consultants  OP Pulmonary- Sleep follow up  Will follow. CODE STATUS: Full Code        Subjective/History: This patient has been seen and evaluated at the request of Dr. Josh Diamond for COPD exacerbation. HPI  Patient is a 81 y.o. female  PMHx of ESRD on HD, COPD not on home O2, CAD, DM, HTN, HFpEF. The patient has been seen by our group as an in-patient. She has not followed up with any Pulmonologist as an OP. The patient states she did not realized she needed to follow up with Pulmonary but that she will in the future. The patient reports increasing shortness of breath and wheezing over the past week- more significant after her HD session. She reports that she had to cut her run 15 minutes early so she could get her medial ride. When she got home she became more short of breath with wheeling. She also noted nausea without emesis. No known sick contacts. No fever or chills. No recurrent nausea. No emesis,  No chest pain  She continues to refrain from smoking, but her  does smoke with in and out side the home. Inhalers: Anoro and PRN albuterol nebs  Respiratory Triggers: smoke and cold air  Pets:  2 has 4: small dogs: chihuahua -poodle mixes- they do not sleep with patient and have their own room  Home: No known water leaks, no bug infestations  Obstructive Sleep Apnea (TESSA): The patient has history of TESSA. She was previously followed by Dr. Jyoti Gonsalves. She reports that she had a recalled Jaime PAP device.   Her DME was [facility]. She reports that she did register her device with [employer], but never got a replacement. The patient stopped using her device and actually brought her device back to [facility]. She has not used PAP therapy for an extended period of time. She is HD dependant. She denies any diet indiscretions. The patient had ID earlier today in our HD unit. She has a THDC in her right upper chest wall. She had a AVF created a few weeks ago and is not ready for use    Interval Evaluation:   01/22/23  LOS: 9    Patient is POD 4 from ex-lap with sigmoid colectomy and colostomy.   Sitting up breathing comfortably on RA  Abd pain improving  She states her breathing is stable, no complaints  No chest pain  No hemoptysis  Doing well with stiolto instead of scheduled nebs/brovana        Patient Vitals for the past 24 hrs:   Temp Pulse Resp BP SpO2   01/22/23 0853 -- -- -- -- 98 %   01/22/23 0822 98.1 °F (36.7 °C) 72 20 (!) 159/78 96 %   01/22/23 0434 98.2 °F (36.8 °C) 70 18 (!) 160/77 92 %   01/22/23 0046 97.5 °F (36.4 °C) 70 18 (!) 150/75 100 %   01/21/23 2032 97.8 °F (36.6 °C) 73 18 (!) 155/99 98 %   01/21/23 1629 98.1 °F (36.7 °C) 84 18 (!) 151/79 97 %   01/21/23 1303 98.1 °F (36.7 °C) 84 18 (!) 169/81 100 %   01/21/23 1224 98.6 °F (37 °C) 72 18 (!) 161/80 --   01/21/23 1213 -- 77 18 (!) 160/70 --   01/21/23 1200 -- 66 18 (!) 165/72 --   01/21/23 1145 -- 68 18 (!) 164/72 --   01/21/23 1130 -- 67 18 (!) 156/74 --   01/21/23 1115 -- 66 18 (!) 153/80 --   01/21/23 1100 -- 68 18 (!) 148/68 --   01/21/23 1045 -- 66 18 (!) 150/77 --   01/21/23 1030 -- 66 18 (!) 148/72 --   01/21/23 1015 -- 66 18 (!) 142/69 --   01/21/23 1000 -- 68 18 (!) 153/73 --         Past Medical History:   Diagnosis Date    Abnormal WBC count 05/17/2016    Anemia     Bilateral shoulder pain 09/27/2016    Chondromalacia of both patellae     Chronic heart failure with preserved ejection fraction ([facility] 1/13/2023    COP[facility]ch[facility] 09/2015    mild-mod dz; Dr Miladys Yee    Diabetes [facility]) 2013    [facility] 2016    Dilated cardiomyopathy ([facility]     Dyslipidemia     Gout     Heart attack ([facility] 10/18/2019    Stented    History of echocardiogram 11/14/2014    Mild LVE. EF 40%. Mild, diffuse hypk. Mild LAE. Mild MR.       Hypercholesteremia 01/08/2014    Hypertension 2000    Noncompliance with medications 02/16/2016    Obesity     Orthostatic hypotension 05/17/2016    Osteoarthritis of both knees     Pain management 04/01/2016    Dr. Mabel Lazaro     Popliteal cyst, bilateral      Sleep apnea     not using cpap    Stage 3b chronic kidney disease ([facility] 08/08/2022    Vitamin D deficiency 10/16/2014      Past Surgical History:   Procedure Laterality Date    HX HEART CATHETERIZATION  2019    Coronary stent    HX TUBAL LIGATION      IR INSERT TUNL CVC W/O PORT OVER 5 YR  09/21/2022        Current Facility-Administered Medications   Medication Dose Route Frequency    predniSONE (DELTASONE) tablet 20 mg  20 mg Oral DAILY WITH BREAKFAST    docusate sodium (COLACE) capsule 100 mg  100 mg Oral BID    heparin (porcine) 1,000 unit/mL injection 3,800 Units  3,800 Units Hemodialysis DIALYSIS TUE, THU & SAT    tiotropium-olodateroL (STIOLTO RESPIMAT) 2.5-2.5 mcg/actuation inhaler 2 Puff  2 Puff Inhalation DAILY    epoetin tonny-epbx (RETACRIT) injection 4,000 Units  4,000 Units SubCUTAneous Q TUE, THU & SAT    pantoprazole (PROTONIX) 40 mg in 0.9% sodium chloride 10 mL injection  40 mg IntraVENous Q12H    piperacillin-tazobactam (ZOSYN) 3.375 g in 0.9% sodium chloride (MBP/ADV) 100 mL MBP  3.375 g IntraVENous Q12H    [Held by provider] melatonin tablet 5 mg  5 mg Oral QHS    heparin (porcine) injection 5,000 Units  5,000 Units SubCUTAneous Q8H    aspirin chewable tablet 162 mg  162 mg Oral DAILY    atorvastatin (LIPITOR) tablet 80 mg  80 mg Oral QHS    ezetimibe (ZETIA) tablet 10 mg  10 mg Oral DAILY    [Held by provider] gabapentin (NEURONTIN) capsule 400 mg  400 mg Oral DAILY    sodium chloride (NS) flush 5-40 mL  5-40 mL IntraVENous Q8H    metoprolol tartrate (LOPRESSOR) tablet 25 mg  25 mg Oral BID    insulin lispro (HUMALOG) injection   SubCUTAneous AC&HS    [Held by provider] insulin glargine (LANTUS) injection 5 Units  5 Units SubCUTAneous QHS    guaiFENesin ER (MUCINEX) tablet 600 mg  600 mg Oral Q12H     No Known Allergies     Review of Systems:  A comprehensive review of systems was negative except for that written in the HPI. Objective:   Vital Signs:    Visit Vitals  BP (!) 159/78   Pulse 72   Temp 98.1 °F (36.7 °C)   Resp 20   Ht 5' 4\" (1.626 m)   Wt 95.8 kg (211 lb 3.2 oz)   SpO2 98%   Breastfeeding Unknown   BMI 36.25 kg/m²       O2 Device: None (Room air)   O2 Flow Rate (L/min): 2 l/min   Temp (24hrs), Av.1 °F (36.7 °C), Min:97.5 °F (36.4 °C), Max:98.6 °F (37 °C)       Intake/Output:   Last shift:      No intake/output data recorded. Last 3 shifts:  1901 -  0700  In: 720 [P.O.:720]  Out: 2800 [Urine:300]    Intake/Output Summary (Last 24 hours) at 2023 0950  Last data filed at 2023 0035  Gross per 24 hour   Intake 720 ml   Output 2800 ml   Net -2080 ml         Physical Exam:    General:  Alert, cooperative, no distress, appears stated age + Obese body habitus   Head:  Normocephalic, without obvious abnormality, atraumatic. Eyes:  Conjunctivae/corneas clear. PERRL, EOMs intact. Nose: Nares normal. Septum midline. Mucosa normal. No drainage or sinus tenderness. Throat: Lips, mucosa, and tongue normal. No teeth   Neck: Supple, symmetrical, trachea midline, no adenopathy, thyroid: no enlargment/tenderness/nodules, no carotid bruit and no JVD. Back:   Symmetric,   Lungs:   Bilateral breath sounds present. Clear to auscultation   Chest wall:  THDC right upper chest wall. No tenderness or deformity.- No crepitus   Heart:  Regular rate and rhythm, S1, S2 normal, no murmur, click, rub or gallop.    Abdomen: Soft, rebound tenderness left lower quadrant. Extremities: Extremities normal, atraumatic, no cyanosis or edema. - unmatured AFV right arm- + palpable thrill   Pulses: 2+ and symmetric all extremities. Skin: Skin color, texture, turgor normal. No rashes or lesions   Lymph nodes:      Cervical, supraclavicular nodes: normal on palpation   Neurologic: Grossly nonfocal       Data:     Recent Labs     01/21/23  0145 01/20/23  0610   WBC 9.7 9.6   HGB 7.9* 9.2*   HCT 26.1* 30.4*    343        Latest Reference Range & Units 12/9/22 12:48 12/10/22 04:27 12/11/22 02:46 12/12/22 04:35 12/13/22 01:46 1/13/23 09:55   EOSINOPHILS 0 - 5 % 3 3 2 2 2 1   ABS. EOSINOPHILS 0.0 - 0.4 K/UL 0.1 0.1 0.1 0.1 0.1 0.0         Recent Labs     01/22/23  0354 01/21/23  0145 01/20/23  0610   * 136 139   K 4.0 4.7 5.2    106 105   CO2 29 25 24   * 145* 164*   BUN 46* 70* 63*   CREA 2.64* 3.73* 3.70*   CA 8.8 8.6 9.4   MG  --  2.3  --    PHOS 4.2 6.0* 7.3*   ALB 2.8* 2.5* 2.7*       Lab Results   Component Value Date/Time    NT pro-BNP 1,057 (H) 10/08/2022 05:15 PM    NT pro-BNP 2,013 (H) 09/09/2022 07:34 AM    NT pro-BNP 1,871 (H) 08/08/2022 01:48 AM    NT pro-BNP 3,952 (H) 07/08/2022 09:53 AM    NT pro-BNP 1,020 (H) 03/18/2021 01:29 AM     No results for input(s): PH, PCO2, PO2, HCO3, FIO2 in the last 72 hours. No results for input(s): FIO2I, IFO2, HCO3I, IHCO3, HCOPOC, PCO2I, PCOPOC, IPHI, PHI, PHPOC, PO2I, PO2POC in the last 72 hours.     No lab exists for component: IPOC2      MICRO:  Results       Procedure Component Value Units Date/Time    CULTURE, BODY FLUID [place] STAIN [[id number]]  (Abnormal)  (Susceptibility) Collected: 01/19/23 7013    Order Status: Completed Specimen: Abdominal Fluid Updated: 01/22/23 0859     Special Requests: NO SPECIAL REQUESTS        GRAM STAIN FEW WBCS SEEN         4+ GRAM POSITIVE RODS         4+ Gram negative rods               2+ Gram positive cocci IN PAIRS           Culture result: MODERATE Escherichia coli               HEAVY Enterococcus faecalis          Susceptibility        Escherichia coli      STEPHANIE (Preliminary)      Amikacin ($) Susceptible      Ampicillin ($) Resistant      Ampicillin/sulbactam ($) Susceptible      Cefoxitin Intermediate      Ciprofloxacin ($) Resistant      Gentamicin ($) Susceptible      Levofloxacin ($) Resistant      Meropenem ($$) Susceptible      Piperacillin/Tazobac ($) Susceptible      Tobramycin ($) Susceptible      Trimeth/Sulfa Resistant                       Susceptibility        Enterococcus faecalis      STPEHANIE (Preliminary)      Ampicillin ($) Susceptible      Daptomycin ($$$$$) Susceptible      Linezolid ($$$$$) Susceptible      Vancomycin ($) Susceptible                           RESPIRATORY VIRUS PANEL W/COVID-19, PCR [[id number]] Collected: 01/13/23 1737    Order Status: Completed Specimen: Nasopharyngeal Updated: 01/13/23 1858     Adenovirus Not detected        Coronavirus 229E Not detected        Coronavirus HKU1 Not detected        Coronavirus CVNL63 Not detected        Coronavirus OC43 Not detected        SARS-CoV-2, PCR Not detected        Metapneumovirus Not detected        Rhinovirus and Enterovirus Not detected        Influenza A Not detected        Influenza A, subtype H1 Not detected        Influenza A, subtype H3 Not detected        INFLUENZA A H1N1 PCR Not detected        Influenza B Not detected        Parainfluenza 1 Not detected        Parainfluenza 2 Not detected        Parainfluenza 3 Not detected        Parainfluenza virus 4 Not detected        RSV by PCR Not detected        B. parapertussis, PCR Not detected        Bordetella pertussis - PCR Not detected        Chlamydophila pneumoniae DNA, QL, PCR Not detected        Mycoplasma pneumoniae DNA, QL, PCR Not detected                 CARDIO:    EKG Results       Procedure 720 Value Units Date/Time    EKG, 12 LEAD, INITIAL [[id number]] Collected: 01/13/23 0942    Order Status: Completed Updated: 01/13/23 1856     Ventricular Rate 98 BPM      Atrial Rate 98 BPM      P-R Interval 160 ms      QRS Duration 78 ms      Q-T Interval 360 ms      QTC Calculation (Bezet) 459 ms      Calculated P Axis 74 degrees      Calculated R Axis 20 degrees      Calculated T Axis 86 degrees      Diagnosis --     Normal sinus rhythm  Left atrial enlargement  Minimal voltage criteria for LVH, may be normal variant ( [place] product )  Borderline ECG  When compared with ECG of 08-DEC-2022 12:19,  No significant change was found  Confirmed by Riky Berrios ([id number]) on 1/13/2023 6:56:19 PM              06/25/22    ECHO ADULT COMPLETE 06/29/2022 6/29/2022    Interpretation Summary    Left Ventricle: Mildly reduced left ventricular systolic function with a visually estimated EF of 50 - 55%. Left ventricle is mildly dilated. Increased wall thickness. Findings consistent with mild concentric hypertrophy. See diagram for wall motion findings. Diastolic dysfunction present with normal LV EF. Mitral Valve: Mildly thickened leaflet. Mild regurgitation with a centrally directed jet. Signed by: Gianfranco Ace MD on 6/29/2022 12:12 PM    Imaging:  I have personally reviewed the patients radiographs and have reviewed the reports:                  Chest CT:  9/20/22  IMPRESSION     1. Interval resolution of extensive bilateral lung airspace disease. 2.  Interval resolved small bilateral pleural effusions and mild cardiomegaly. 3.  Interval resolved lymphadenopathy. 4.  Chronic interstitial reticular and cystic changes of the lungs appear  stable. Thank you for your referral.            Complex decision making was made in the evaluation and management plans during this consultation. More than 50% of time was spent in counseling and coordination of care including review of data and discussion with other team members. Donald Merlos.  Nakul Duke, [facility]  Pulmonary, Critical Care, and Sleep Medicine

## 2023-01-22 NOTE — PROGRESS NOTES
RENAL DAILY PROGRESS NOTE              Subjective:       Complaint:   Overnight events noted  no nausea, vomiting, chest pain  Breathing is better      IMPRESSION:   IMPRESSION:   ESRD TTS  Access: TDC,maturing av fistula on right  arm(placed 3 weeks ago-awaiting rpt procedure for superfilization)   Abdominal perforation due to diverticulitis. s/p procedure and colostomy  COPD excerebration. CAD,cardiomyopathy  Secondary hyperparathyroidism  ACD   PLAN:   Will keep her on  dialysis. tue thurs sat  C/w epogen  Diet per surgery  Avoid Gadolinium due to its association with nephrogenic systemic fibrosis in a patients with severe ARF and ESRD. Please dose all medications for creatinine clearance <15/dialysis. Avoid blood pressure checks, blood draws, peripheral iv's on arm with access.                  Current Facility-Administered Medications   Medication Dose Route Frequency    predniSONE (DELTASONE) tablet 20 mg  20 mg Oral DAILY WITH BREAKFAST    docusate sodium (COLACE) capsule 100 mg  100 mg Oral BID    heparin (porcine) 1,000 unit/mL injection 3,800 Units  3,800 Units Hemodialysis DIALYSIS TUE, THU & SAT    tiotropium-olodateroL (STIOLTO RESPIMAT) 2.5-2.5 mcg/actuation inhaler 2 Puff  2 Puff Inhalation DAILY    albuterol (ACCUNEB) nebulizer solution 1.25 mg  1.25 mg Nebulization Q6H PRN    oxyCODONE-acetaminophen (PERCOCET) 5-325 mg per tablet 1 Tablet  1 Tablet Oral Q4H PRN    traMADoL (ULTRAM) tablet 50 mg  50 mg Oral Q6H PRN    epoetin tonny-epbx (RETACRIT) injection 4,000 Units  4,000 Units SubCUTAneous Q TUE, THU & SAT    naloxone (NARCAN) injection 0.4 mg  0.4 mg IntraVENous PRN    pantoprazole (PROTONIX) 40 mg in 0.9% sodium chloride 10 mL injection  40 mg IntraVENous Q12H    piperacillin-tazobactam (ZOSYN) 3.375 g in 0.9% sodium chloride (MBP/ADV) 100 mL MBP  3.375 g IntraVENous Q12H    HYDROmorphone (DILAUDID) injection 1 mg  1 mg IntraVENous Q3H PRN    sodium chloride (OCEAN) 0.65 % nasal squeeze bottle 2 Spray  2 Spray Both Nostrils Q2H PRN    [Held by provider] melatonin tablet 5 mg  5 mg Oral QHS    heparin (porcine) injection 5,000 Units  5,000 Units SubCUTAneous Q8H    aspirin chewable tablet 162 mg  162 mg Oral DAILY    atorvastatin (LIPITOR) tablet 80 mg  80 mg Oral QHS    ezetimibe (ZETIA) tablet 10 mg  10 mg Oral DAILY    [Held by provider] gabapentin (NEURONTIN) capsule 400 mg  400 mg Oral DAILY    sodium chloride (NS) flush 5-40 mL  5-40 mL IntraVENous Q8H    sodium chloride (NS) flush 5-40 mL  5-40 mL IntraVENous PRN    acetaminophen (TYLENOL) tablet 650 mg  650 mg Oral Q6H PRN    Or    acetaminophen (TYLENOL) suppository 650 mg  650 mg Rectal Q6H PRN    polyethylene glycol (MIRALAX) packet 17 g  17 g Oral DAILY PRN    bisacodyL (DULCOLAX) suppository 10 mg  10 mg Rectal DAILY PRN    ondansetron (ZOFRAN ODT) tablet 4 mg  4 mg Oral Q8H PRN    Or    ondansetron (ZOFRAN) injection 4 mg  4 mg IntraVENous Q6H PRN    metoprolol tartrate (LOPRESSOR) tablet 25 mg  25 mg Oral BID    insulin lispro (HUMALOG) injection   SubCUTAneous AC&HS    glucose chewable tablet 16 g  4 Tablet Oral PRN    glucagon (GLUCAGEN) injection 1 mg  1 mg IntraMUSCular PRN    dextrose 10% infusion 0-250 mL  0-250 mL IntraVENous PRN    [Held by provider] insulin glargine (LANTUS) injection 5 Units  5 Units SubCUTAneous QHS    guaiFENesin ER (MUCINEX) tablet 600 mg  600 mg Oral Q12H       Review of Symptoms: comprehensive ROS negative except above.    Objective:   Patient Vitals for the past 24 hrs:   Temp Pulse Resp BP SpO2   01/22/23 1208 98.5 °F (36.9 °C) 88 20 (!) 117/90 98 %   01/22/23 0853 -- -- -- -- 98 %   01/22/23 0822 98.1 °F (36.7 °C) 72 20 (!) 159/78 96 %   01/22/23 0434 98.2 °F (36.8 °C) 70 18 (!) 160/77 92 %   01/22/23 0046 97.5 °F (36.4 °C) 70 18 (!) 150/75 100 %   01/21/23 2032 97.8 °F (36.6 °C) 73 18 (!) 155/99 98 %   01/21/23 1629 98.1 °F (36.7 °C) 84 18 (!) 151/79 97 %          Weight change: -3.2 kg (-7 lb 0.9 oz)     01/20 1901 - 01/22 0700  In: 720 [P.O.:720]  Out: 2800 [Urine:300]    Intake/Output Summary (Last 24 hours) at 1/22/2023 1409  Last data filed at 1/22/2023 0035  Gross per 24 hour   Intake 120 ml   Output 300 ml   Net -180 ml       Physical Exam:   General: comfortable, no acute distress   HEENT sclera anicteric, supple neck, no thyromegaly  CVS: S1S2 heard,  no rub  RS: + air entry b/l  Abd: Soft,colostomy  Neuro: non focal, awake, alert , CN II-XII are grossly intact  Extrm: no edema, no cyanosis, clubbing   Skin: no visible  Rash  Musculoskeletal: No gross joints or bone deformities   Access: maturing right arm access      Data Review:     LABS:   Hematology:   Recent Labs     01/21/23  0145 01/20/23  0610   WBC 9.7 9.6   HGB 7.9* 9.2*   HCT 26.1* 30.4*       Chemistry:   Recent Labs     01/22/23  0354 01/21/23  0145 01/20/23  0610   BUN 46* 70* 63*   CREA 2.64* 3.73* 3.70*   CA 8.8 8.6 9.4   ALB 2.8* 2.5* 2.7*   K 4.0 4.7 5.2   * 136 139    106 105   CO2 29 25 24   PHOS 4.2 6.0* 7.3*   * 145* 164*              Procedures/imaging: see electronic medical records for all procedures, Xrays and details which were not copied into this note but were reviewed prior to creation of Plan          Assessment & Plan:       See above      Kvng Ramos MD  1/22/2023

## 2023-01-22 NOTE — PROGRESS NOTES
Bedside shift change report given to Luh Marsh RN (oncoming nurse) by Suzanne Walsh RN (offgoing nurse). Report included the following information SBAR, Kardex, Intake/Output, MAR, and Recent Results.     Wound Prevention Checklist    Patient: Sunday Faisal (61 y.o. female)  Date: 1/21/2023  Diagnosis: COPD exacerbation ([facility] [J44.1] COPD with acute exacerbation ([facility]    Precautions: Fall       []  Heel prevention boots placed on patient    []  Patient turned q2h during shift    []  Lift team ordered    []  Patient on Kelly bed/Specialty bed    []  Each Wound is documented during shift (Stage, Color, drainage, odor, measurements, and dressings)    [x]  Dual skin check done with Andrea Reyes

## 2023-01-22 NOTE — ROUTINE PROCESS
Bedside and Verbal shift change report given to FLORENCIA Luna (oncoming nurse) by Rehan Acosta RN (offgoing nurse). Report included the following information SBAR, Kardex, MAR and Recent Results.     SITUATION:  Code Status: Full Code  Reason for Admission: COPD exacerbation ([facility] [J44.1]  Hospital day: 9  Problem List:       Hospital Problems  Date Reviewed: 1/18/2023            Codes Class Noted POA    Sinus tachycardia ICD-10-CM: R00.0  ICD-9-CM: 427.89  1/13/2023 Yes        Chronic heart failure with preserved ejection fraction [facility]) ICD-10-CM: I50.32  ICD-9-CM: 428.9  1/13/2023 Yes        Anemia due to chronic kidney disease, on chronic dialysis ([facility] ICD-10-CM: N18.6, D63.1, Z99.2  ICD-9-CM: 585.6, 285.21, V45.11  1/13/2023 Yes        ESRD (end stage renal disease) on dialysis [facility]) ICD-10-CM: N18.6, Z99.2  ICD-9-CM: 585.6, V45.11  12/8/2022 Yes        * (Principal) COPD with acute exacerbation ([facility] ICD-10-CM: J44.1  ICD-9-CM: 491.21  10/19/2019 Yes        Type 2 diabetes mellitus with hyperglycemia, with long-term current use of insulin (HCC) ICD-10-CM: E11.65, Z79.4  ICD-9-CM: 250.00, 790.29, V58.67  7/12/2018 Yes        Coronary artery disease involving native coronary artery of native heart without angina pectoris ICD-10-CM: I25.10  ICD-9-CM: 414.01  5/31/2018 Yes        Essential hypertension (Chronic) ICD-10-CM: I10  ICD-9-CM: 401.9  9/17/2015 Yes           BACKGROUND:   Past Medical History:   Past Medical History:   Diagnosis Date    Abnormal WBC count 05/17/2016    Anemia     Bilateral shoulder pain 09/27/2016    Chondromalacia of both patellae     Chronic heart failure with preserved ejection fraction ([facility] 1/13/2023    COPD (chronic obstructive pulmonary disease) ([facility] 09/2015    mild-mod dz; Dr Mick Escobar    Diabetes [facility]) 2013    Diabetic eye exam ([facility] 2016    Dilated cardiomyopathy ([facility]     Dyslipidemia     Gout     Heart attack ([facility] 10/18/2019    Stented    History of echocardiogram 11/14/2014    Mild LVE. EF 40%. Mild, diffuse hypk. Mild LAE. Mild MR.       Hypercholesteremia 01/08/2014    Hypertension 2000    Noncompliance with medications 02/16/2016    Obesity     Orthostatic hypotension 05/17/2016    Osteoarthritis of both knees     Pain management 04/01/2016    Dr. Shelbi Parsonon     Popliteal cyst, bilateral      Sleep apnea     not using cpap    Stage 3b chronic kidney disease ([address] 08/08/2022    Vitamin D deficiency 10/16/2014      Patient taking anticoagulants yes    Patient has a defibrillator: no    History of shots YES for example, flu, pneumonia, tetanus   Isolation History NO for example, MRSA, CDiff    ASSESSMENT:  Changes in Assessment Throughout Shift: NONE  Significant Changes in 24 hours (for example, RR/code, fall)  Patient has Central Line: yes Reasons if yes: Dialysis  Patient has Frye Cath: no   Mobility Issues  PT  IV Patency  OR Checklist  Pending Tests    Last Vitals:  Vitals w/ MEWS Score (last day)       Date/Time MEWS Score Pulse Resp Temp BP Level of Consciousness SpO2    01/22/23 0434 1 70 18 98.2 °F (36.8 °C) 160/77 0 92 %    01/22/23 0046 1 70 18 97.5 °F (36.4 °C) 150/75 0 100 %    01/21/23 2032 1 73 18 97.8 °F (36.6 °C) 155/99 0 98 %    01/21/23 1629 -- 84 18 98.1 °F (36.7 °C) 151/79 -- 97 %    01/21/23 1303 1 84 18 98.1 °F (36.7 °C) 169/81 0 100 %    01/21/23 1224 -- 72 18 98.6 °F (37 °C) 161/80 -- --    01/21/23 1213 -- 77 18 -- 160/70 -- --    01/21/23 1200 -- 66 18 -- 165/72 -- --    01/21/23 1145 -- 68 18 -- 164/72 -- --    01/21/23 1130 -- 67 18 -- 156/74 -- --    01/21/23 1115 -- 66 18 -- 153/80 -- --    01/21/23 1100 -- 68 18 -- 148/68 -- --    01/21/23 1045 -- 66 18 -- 150/77 -- --    01/21/23 1030 -- 66 18 -- 148/72 -- --    01/21/23 1015 -- 66 18 -- 142/69 -- --    01/21/23 1000 -- 68 18 -- 153/73 -- --    01/21/23 0945 -- 70 18 -- 161/70 -- --    01/21/23 0930 -- 75 18 -- 160/76 -- --    01/21/23 0915 -- 61 18 -- 172/69 -- --    01/21/23 0910 -- 78 18 98.4 °F (36.9 °C) 160/79 -- --    01/21/23 0452 1 71 20 98 °F (36.7 °C) 129/83 0 98 %    01/21/23 0000 1 70 20 98.2 °F (36.8 °C) 147/88 0 99 %          PAIN    Pain Assessment    Pain Intensity 1: 10 (01/22/23 0727)    Pain Location 1: Abdomen    Pain Intervention(s) 1: Medication (see MAR)    Patient Stated Pain Goal: 0  Intervention effective: yes  Time of last intervention: 0727 Reassessment Completed: yes   Other actions taken for pain: Distraction    Last 3 Weights:  Last 3 Recorded Weights in this Encounter    01/18/23 1235 01/20/23 1821 01/22/23 0529   Weight: 97.2 kg (214 lb 4.8 oz) 99 kg (218 lb 4.1 oz) 95.8 kg (211 lb 3.2 oz)   Weight change: -3.2 kg (-7 lb 0.9 oz)    INTAKE/OUPUT    Current Shift: No intake/output data recorded. Last three shifts: 01/20 1901 - 01/22 0700  In: 5 [P.O.:720]  Out: 2800 [Urine:300]    RECOMMENDATIONS AND DISCHARGE PLANNING  Patient needs and requests: Pain Management    Pending tests/procedures: labs     Discharge plan for patient: Home    Discharge planning Needs or Barriers: None    Estimated Discharge Date: 11/23/2023 Posted on Whiteboard in Patients Room: yes       \"HEALS\" SAFETY CHECK  A safety check occurred in the patient's room between off going nurse and oncoming nurse listed above. The safety check included the below items:    H  High Alert Medications Verify all high alert medication drips (heparin, PCA, etc.)  E  Equipment Suction is set up for ALL patients (with yanker)  Red plugs utilized for all equipment (IV pumps, etc.)  WOWs wiped down at end of shift. Room stocked with oxygen, suction, and other unit-specific supplies  A  Alarms Bed alarm is set for fall risk patients  Ensure chair alarm is in place and activated if patient is up in a chair  L  Lines Check IV for any infiltration  Frye bag is empty if patient has a Frye   Tubing and IV bags are labeled  S  Safety  Room is clean, patient is clean, and equipment is clean.   Hallways are clear from equipment besides carts. Fall bracelet on for fall risk patients  Ensure room is clear and free of clutter  Suction is set up for ALL patients (with rosanne)  Hallways are clear from equipment besides carts.    Isolation precautions followed, supplies available outside room, sign posted    Emilie Orta RN

## 2023-01-23 LAB
ANION GAP SERPL CALC-SCNC: 7 MMOL/L (ref 3–18)
BACTERIA SPEC CULT: ABNORMAL
BASOPHILS # BLD: 0 K/UL (ref 0–0.1)
BASOPHILS NFR BLD: 0 % (ref 0–2)
BUN SERPL-MCNC: 53 MG/DL (ref 7–18)
BUN/CREAT SERPL: 21 (ref 12–20)
CALCIUM SERPL-MCNC: 8.8 MG/DL (ref 8.5–10.1)
CHLORIDE SERPL-SCNC: 99 MMOL/L (ref 100–111)
CO2 SERPL-SCNC: 26 MMOL/L (ref 21–32)
CREAT SERPL-MCNC: 2.5 MG/DL (ref 0.6–1.3)
DIFFERENTIAL METHOD BLD: ABNORMAL
EOSINOPHIL # BLD: 0 K/UL (ref 0–0.4)
EOSINOPHIL NFR BLD: 0 % (ref 0–5)
ERYTHROCYTE [DISTWIDTH] IN BLOOD BY AUTOMATED COUNT: 17.5 % (ref 11.6–14.5)
GLUCOSE BLD STRIP.AUTO-MCNC: 111 MG/DL (ref 70–110)
GLUCOSE BLD STRIP.AUTO-MCNC: 119 MG/DL (ref 70–110)
GLUCOSE BLD STRIP.AUTO-MCNC: 153 MG/DL (ref 70–110)
GLUCOSE BLD STRIP.AUTO-MCNC: 168 MG/DL (ref 70–110)
GLUCOSE SERPL-MCNC: 122 MG/DL (ref 74–99)
GRAM STN SPEC: ABNORMAL
HCT VFR BLD AUTO: 26.8 % (ref 35–45)
HGB BLD-MCNC: 8.4 G/DL (ref 12–16)
IMM GRANULOCYTES # BLD AUTO: 0 K/UL (ref 0–0.04)
IMM GRANULOCYTES NFR BLD AUTO: 0 % (ref 0–0.5)
LYMPHOCYTES # BLD: 1.7 K/UL (ref 0.9–3.6)
LYMPHOCYTES NFR BLD: 16 % (ref 21–52)
MAGNESIUM SERPL-MCNC: 2.1 MG/DL (ref 1.6–2.6)
MCH RBC QN AUTO: 27.9 PG (ref 24–34)
MCHC RBC AUTO-ENTMCNC: 31.3 G/DL (ref 31–37)
MCV RBC AUTO: 89 FL (ref 78–100)
METAMYELOCYTES NFR BLD MANUAL: 1 %
MONOCYTES # BLD: 0.4 K/UL (ref 0.05–1.2)
MONOCYTES NFR BLD: 4 % (ref 3–10)
MYELOCYTES NFR BLD MANUAL: 1 %
NEUTS SEG # BLD: 8.1 K/UL (ref 1.8–8)
NEUTS SEG NFR BLD: 78 % (ref 40–73)
NRBC # BLD: 0.14 K/UL (ref 0–0.01)
NRBC BLD-RTO: 1.3 PER 100 WBC
PHOSPHATE SERPL-MCNC: 3.6 MG/DL (ref 2.5–4.9)
PLATELET # BLD AUTO: 341 K/UL (ref 135–420)
PLATELET COMMENTS,PCOM: ABNORMAL
PMV BLD AUTO: 9.9 FL (ref 9.2–11.8)
POTASSIUM SERPL-SCNC: 3.9 MMOL/L (ref 3.5–5.5)
RBC # BLD AUTO: 3.01 M/UL (ref 4.2–5.3)
RBC MORPH BLD: ABNORMAL
SERVICE CMNT-IMP: ABNORMAL
SODIUM SERPL-SCNC: 132 MMOL/L (ref 136–145)
WBC # BLD AUTO: 10.4 K/UL (ref 4.6–13.2)

## 2023-01-23 PROCEDURE — 97164 PT RE-EVAL EST PLAN CARE: CPT

## 2023-01-23 PROCEDURE — 36415 COLL VENOUS BLD VENIPUNCTURE: CPT

## 2023-01-23 PROCEDURE — 74011636637 HC RX REV CODE- 636/637: Performed by: INTERNAL MEDICINE

## 2023-01-23 PROCEDURE — 74011000258 HC RX REV CODE- 258: Performed by: SURGERY

## 2023-01-23 PROCEDURE — 84100 ASSAY OF PHOSPHORUS: CPT

## 2023-01-23 PROCEDURE — 85025 COMPLETE CBC W/AUTO DIFF WBC: CPT

## 2023-01-23 PROCEDURE — 74011250637 HC RX REV CODE- 250/637: Performed by: HOSPITALIST

## 2023-01-23 PROCEDURE — 74011000250 HC RX REV CODE- 250: Performed by: SURGERY

## 2023-01-23 PROCEDURE — 74011250636 HC RX REV CODE- 250/636: Performed by: PHYSICIAN ASSISTANT

## 2023-01-23 PROCEDURE — 80048 BASIC METABOLIC PNL TOTAL CA: CPT

## 2023-01-23 PROCEDURE — 74011636637 HC RX REV CODE- 636/637: Performed by: SURGERY

## 2023-01-23 PROCEDURE — 65270000029 HC RM PRIVATE

## 2023-01-23 PROCEDURE — 97168 OT RE-EVAL EST PLAN CARE: CPT

## 2023-01-23 PROCEDURE — 74011000250 HC RX REV CODE- 250: Performed by: EMERGENCY MEDICINE

## 2023-01-23 PROCEDURE — 74011250637 HC RX REV CODE- 250/637: Performed by: PHYSICIAN ASSISTANT

## 2023-01-23 PROCEDURE — 97116 GAIT TRAINING THERAPY: CPT

## 2023-01-23 PROCEDURE — 97535 SELF CARE MNGMENT TRAINING: CPT

## 2023-01-23 PROCEDURE — C9113 INJ PANTOPRAZOLE SODIUM, VIA: HCPCS | Performed by: EMERGENCY MEDICINE

## 2023-01-23 PROCEDURE — 74011250637 HC RX REV CODE- 250/637: Performed by: SURGERY

## 2023-01-23 PROCEDURE — 74011250636 HC RX REV CODE- 250/636: Performed by: EMERGENCY MEDICINE

## 2023-01-23 PROCEDURE — 74011250636 HC RX REV CODE- 250/636: Performed by: SURGERY

## 2023-01-23 PROCEDURE — 99232 SBSQ HOSP IP/OBS MODERATE 35: CPT | Performed by: EMERGENCY MEDICINE

## 2023-01-23 PROCEDURE — 74011250636 HC RX REV CODE- 250/636: Performed by: INTERNAL MEDICINE

## 2023-01-23 PROCEDURE — 94762 N-INVAS EAR/PLS OXIMTRY CONT: CPT

## 2023-01-23 PROCEDURE — 83735 ASSAY OF MAGNESIUM: CPT

## 2023-01-23 PROCEDURE — 99232 SBSQ HOSP IP/OBS MODERATE 35: CPT | Performed by: INTERNAL MEDICINE

## 2023-01-23 PROCEDURE — 82962 GLUCOSE BLOOD TEST: CPT

## 2023-01-23 PROCEDURE — 2709999900 HC NON-CHARGEABLE SUPPLY

## 2023-01-23 PROCEDURE — 74011000258 HC RX REV CODE- 258: Performed by: INTERNAL MEDICINE

## 2023-01-23 RX ADMIN — PANTOPRAZOLE SODIUM 40 MG: 40 INJECTION, POWDER, FOR SOLUTION INTRAVENOUS at 20:42

## 2023-01-23 RX ADMIN — GUAIFENESIN 600 MG: 600 TABLET, EXTENDED RELEASE ORAL at 09:37

## 2023-01-23 RX ADMIN — Medication 3 UNITS: at 18:17

## 2023-01-23 RX ADMIN — PANTOPRAZOLE SODIUM 40 MG: 40 INJECTION, POWDER, FOR SOLUTION INTRAVENOUS at 09:40

## 2023-01-23 RX ADMIN — SODIUM CHLORIDE, PRESERVATIVE FREE 10 ML: 5 INJECTION INTRAVENOUS at 06:20

## 2023-01-23 RX ADMIN — GENTAMICIN SULFATE 140 MG: 40 INJECTION, SOLUTION INTRAMUSCULAR; INTRAVENOUS at 23:19

## 2023-01-23 RX ADMIN — HYDROMORPHONE HYDROCHLORIDE 1 MG: 1 INJECTION, SOLUTION INTRAMUSCULAR; INTRAVENOUS; SUBCUTANEOUS at 03:23

## 2023-01-23 RX ADMIN — SODIUM CHLORIDE, PRESERVATIVE FREE 10 ML: 5 INJECTION INTRAVENOUS at 21:27

## 2023-01-23 RX ADMIN — PIPERACILLIN AND TAZOBACTAM 3.38 G: 3; .375 INJECTION, POWDER, FOR SOLUTION INTRAVENOUS at 09:45

## 2023-01-23 RX ADMIN — ASPIRIN 81 MG CHEWABLE TABLET 162 MG: 81 TABLET CHEWABLE at 09:53

## 2023-01-23 RX ADMIN — HYDROMORPHONE HYDROCHLORIDE 1 MG: 1 INJECTION, SOLUTION INTRAMUSCULAR; INTRAVENOUS; SUBCUTANEOUS at 21:22

## 2023-01-23 RX ADMIN — HYDROMORPHONE HYDROCHLORIDE 1 MG: 1 INJECTION, SOLUTION INTRAMUSCULAR; INTRAVENOUS; SUBCUTANEOUS at 00:09

## 2023-01-23 RX ADMIN — HYDROMORPHONE HYDROCHLORIDE 1 MG: 1 INJECTION, SOLUTION INTRAMUSCULAR; INTRAVENOUS; SUBCUTANEOUS at 06:21

## 2023-01-23 RX ADMIN — Medication 3 UNITS: at 09:36

## 2023-01-23 RX ADMIN — EZETIMIBE 10 MG: 10 TABLET ORAL at 09:37

## 2023-01-23 RX ADMIN — HYDROMORPHONE HYDROCHLORIDE 1 MG: 1 INJECTION, SOLUTION INTRAMUSCULAR; INTRAVENOUS; SUBCUTANEOUS at 18:15

## 2023-01-23 RX ADMIN — METOPROLOL TARTRATE 25 MG: 25 TABLET, FILM COATED ORAL at 09:37

## 2023-01-23 RX ADMIN — PREDNISONE 20 MG: 20 TABLET ORAL at 09:37

## 2023-01-23 RX ADMIN — ATORVASTATIN CALCIUM 80 MG: 40 TABLET, FILM COATED ORAL at 21:21

## 2023-01-23 RX ADMIN — HYDROMORPHONE HYDROCHLORIDE 1 MG: 1 INJECTION, SOLUTION INTRAMUSCULAR; INTRAVENOUS; SUBCUTANEOUS at 13:19

## 2023-01-23 RX ADMIN — PIPERACILLIN AND TAZOBACTAM 3.38 G: 3; .375 INJECTION, POWDER, FOR SOLUTION INTRAVENOUS at 20:22

## 2023-01-23 RX ADMIN — HYDROMORPHONE HYDROCHLORIDE 1 MG: 1 INJECTION, SOLUTION INTRAMUSCULAR; INTRAVENOUS; SUBCUTANEOUS at 09:40

## 2023-01-23 RX ADMIN — DOCUSATE SODIUM 100 MG: 100 CAPSULE, LIQUID FILLED ORAL at 18:14

## 2023-01-23 RX ADMIN — HEPARIN SODIUM 5000 UNITS: 5000 INJECTION INTRAVENOUS; SUBCUTANEOUS at 02:27

## 2023-01-23 RX ADMIN — POLYETHYLENE GLYCOL 3350 17 G: 17 POWDER, FOR SOLUTION ORAL at 09:35

## 2023-01-23 RX ADMIN — DOCUSATE SODIUM 100 MG: 100 CAPSULE, LIQUID FILLED ORAL at 09:37

## 2023-01-23 RX ADMIN — TIOTROPIUM BROMIDE AND OLODATEROL 2 PUFF: 3.124; 2.736 SPRAY, METERED RESPIRATORY (INHALATION) at 12:30

## 2023-01-23 RX ADMIN — GUAIFENESIN 600 MG: 600 TABLET, EXTENDED RELEASE ORAL at 20:47

## 2023-01-23 RX ADMIN — SODIUM CHLORIDE, PRESERVATIVE FREE 10 ML: 5 INJECTION INTRAVENOUS at 18:20

## 2023-01-23 RX ADMIN — METOPROLOL TARTRATE 25 MG: 25 TABLET, FILM COATED ORAL at 18:14

## 2023-01-23 NOTE — PROGRESS NOTES
Problem: Falls - Risk of  Goal: *Absence of Falls  Description: Document Kelsy Sarmiento Fall Risk and appropriate interventions in the flowsheet.   Outcome: Progressing Towards Goal  Note: Fall Risk Interventions:            Medication Interventions: Assess postural VS orthostatic hypotension         History of Falls Interventions: Bed/chair exit alarm, Door open when patient unattended, Utilize gait belt for transfer/ambulation         Problem: Patient Education: Go to Patient Education Activity  Goal: Patient/Family Education  Outcome: Progressing Towards Goal     Problem: Patient Education: Go to Patient Education Activity  Goal: Patient/Family Education  Outcome: Progressing Towards Goal     Problem: Patient Education: Go to Patient Education Activity  Goal: Patient/Family Education  Outcome: Progressing Towards Goal     Problem: Nutrition Deficit  Goal: *Optimize nutritional status  Outcome: Progressing Towards Goal     Problem: Chronic Renal Failure  Goal: *Fluid and electrolytes stabilized  Outcome: Progressing Towards Goal     Problem: Patient Education: Go to Patient Education Activity  Goal: Patient/Family Education  Outcome: Progressing Towards Goal     Problem: Pain  Goal: *Control of Pain  Outcome: Progressing Towards Goal     Problem: Patient Education: Go to Patient Education Activity  Goal: Patient/Family Education  Outcome: Progressing Towards Goal

## 2023-01-23 NOTE — WOUND CARE
Physical Exam  Room 459: continued colostomy care & teaching    Wafer is wet & not attached to skin due to dark blood around edges of stoma. Stoma site care provided & taught pt how to care for skin. Stoma is still ischemic. pt had small soft brown piece of BM on stoma mucosa during stoma care. Fitted with barrier ring around stoma & 2 piece Coloplast cut to fit appliance & drainable pouch. Skin prep applied to peristomal skin & wafer edges to seal.  Stoma Cutout pattern with extra supplies on high shelf in room. Provided & went over ostomy appliance change card. Pt states her  works during the day, offered to stay late one afternoon for teaching session if needed. Pt answers questions, but is sleepy when not talking, & having difficulty \"getting her words out\". Will turn over care to nursing staff at this time.   Ashkan LOERAN, RN, [facility], [address]

## 2023-01-23 NOTE — ROUTINE PROCESS
Bedside and Verbal shift change report given to FLORENCIA Luna (oncoming nurse) by Michael Yuen RN (offgoing nurse). Report included the following information SBAR, Kardex, MAR and Recent Results.     SITUATION:  Code Status: Full Code  Reason for Admission: COPD exacerbation ([facility] [J44.1]  Hospital day: 10  Problem List:       Hospital Problems  Date Reviewed: 1/18/2023            Codes Class Noted POA    Sinus tachycardia ICD-10-CM: R00.0  ICD-9-CM: 427.89  1/13/2023 Yes        Chronic heart failure with preserved ejection fraction [facility]) ICD-10-CM: I50.32  ICD-9-CM: 428.9  1/13/2023 Yes        Anemia due to chronic kidney disease, on chronic dialysis ([facility] ICD-10-CM: N18.6, D63.1, Z99.2  ICD-9-CM: 585.6, 285.21, V45.11  1/13/2023 Yes        ESRD (end stage renal disease) on dialysis [facility]) ICD-10-CM: N18.6, Z99.2  ICD-9-CM: 585.6, V45.11  12/8/2022 Yes        * (Principal) COPD with acute exacerbation ([facility] ICD-10-CM: J44.1  ICD-9-CM: 491.21  10/19/2019 Yes        Type 2 diabetes mellitus with hyperglycemia, with long-term current use of insulin (HCC) ICD-10-CM: E11.65, Z79.4  ICD-9-CM: 250.00, 790.29, V58.67  7/12/2018 Yes        Coronary artery disease involving native coronary artery of native heart without angina pectoris ICD-10-CM: I25.10  ICD-9-CM: 414.01  5/31/2018 Yes        Essential hypertension (Chronic) ICD-10-CM: I10  ICD-9-CM: 401.9  9/17/2015 Yes         BACKGROUND:   Past Medical History:   Past Medical History:   Diagnosis Date    Abnormal WBC count 05/17/2016    Anemia     Bilateral shoulder pain 09/27/2016    Chondromalacia of both patellae     Chronic heart failure with preserved ejection fraction ([facility] 1/13/2023    COPD (chronic obstructive pulmonary disease) ([facility] 09/2015    mild-mod dz; Dr Brooke Bryant    Diabetes [facility]) 2013    Diabetic eye exam ([facility] 2016    Dilated cardiomyopathy ([facility]     Dyslipidemia     Gout     Heart attack ([facility] 10/18/2019    Stented    History of echocardiogram 11/14/2014    Mild LVE. EF 40%. Mild, diffuse hypk. Mild LAE. Mild MR.       Hypercholesteremia 01/08/2014    Hypertension 2000    Noncompliance with medications 02/16/2016    Obesity     Orthostatic hypotension 05/17/2016    Osteoarthritis of both knees     Pain management 04/01/2016    Dr. Grayson Raphael     Popliteal cyst, bilateral      Sleep apnea     not using cpap    Stage 3b chronic kidney disease ([address] 08/08/2022    Vitamin D deficiency 10/16/2014      Patient taking anticoagulants yes    Patient has a defibrillator: no    History of shots YES for example, flu, pneumonia, tetanus   Isolation History NO for example, MRSA, CDiff    ASSESSMENT:  Changes in Assessment Throughout Shift: NONE  Significant Changes in 24 hours (for example, RR/code, fall)  Patient has Central Line: yes Reasons if yes: Dialysis  Patient has Frye Cath: no   Mobility Issues  PT  IV Patency  OR Checklist  Pending Tests    Last Vitals:  Vitals w/ MEWS Score (last day)       Date/Time MEWS Score Pulse Resp Temp BP Level of Consciousness SpO2    01/23/23 0425 1 65 20 97.7 °F (36.5 °C) 155/76 0 98 %    01/23/23 0015 1 67 20 98.1 °F (36.7 °C) 152/69 0 97 %    01/22/23 2136 1 67 20 98.6 °F (37 °C) 160/82 0 97 %    01/22/23 1528 1 74 20 98.4 °F (36.9 °C) 158/75 0 97 %    01/22/23 1208 1 88 20 98.5 °F (36.9 °C) 117/90 0 98 %    01/22/23 0853 -- -- -- -- -- -- 98 %    01/22/23 0822 1 72 20 98.1 °F (36.7 °C) 159/78 0 96 %    01/22/23 0434 1 70 18 98.2 °F (36.8 °C) 160/77 0 92 %    01/22/23 0046 1 70 18 97.5 °F (36.4 °C) 150/75 0 100 %          PAIN    Pain Assessment    Pain Intensity 1: 2 (01/23/23 0654)    Pain Location 1: Abdomen    Pain Intervention(s) 1: Medication (see MAR)    Patient Stated Pain Goal: 0  Intervention effective: yes  Time of last intervention: 0621 Reassessment Completed: yes   Other actions taken for pain: Distraction    Last 3 Weights:  Last 3 Recorded Weights in this Encounter    01/20/23 1821 01/22/23 0529 01/23/23 0321   Weight: 99 kg (218 lb 4.1 oz) 95.8 kg (211 lb 3.2 oz) 96 kg (211 lb 10.3 oz)   Weight change: 0.2 kg (7.1 oz)    INTAKE/OUPUT    Current Shift: No intake/output data recorded. Last three shifts: 01/21 1901 - 01/23 0700  In: 240 [P.O.:240]  Out: 1410 [Urine:1400]    RECOMMENDATIONS AND DISCHARGE PLANNING  Patient needs and requests: Pain Management    Pending tests/procedures: labs     Discharge plan for patient: Home    Discharge planning Needs or Barriers: None    Estimated Discharge Date: 11/23/2023 Posted on Whiteboard in Patients Room: yes       \"HEALS\" SAFETY CHECK  A safety check occurred in the patient's room between off going nurse and oncoming nurse listed above. The safety check included the below items:    H  High Alert Medications Verify all high alert medication drips (heparin, PCA, etc.)  E  Equipment Suction is set up for ALL patients (with rosanne)  Red plugs utilized for all equipment (IV pumps, etc.)  WOWs wiped down at end of shift. Room stocked with oxygen, suction, and other unit-specific supplies  A  Alarms Bed alarm is set for fall risk patients  Ensure chair alarm is in place and activated if patient is up in a chair  L  Lines Check IV for any infiltration  Frye bag is empty if patient has a Frye   Tubing and IV bags are labeled  S  Safety  Room is clean, patient is clean, and equipment is clean. Hallways are clear from equipment besides carts. Fall bracelet on for fall risk patients  Ensure room is clear and free of clutter  Suction is set up for ALL patients (with rosanne)  Hallways are clear from equipment besides carts.    Isolation precautions followed, supplies available outside room, sign posted    Indigo Segovia RN

## 2023-01-23 NOTE — PROGRESS NOTES
Bedside shift change report given to Yancey Severs, RN (oncoming nurse) by Gena Crawford RN (offgoing nurse). Report included the following information SBAR, Kardex, Intake/Output, MAR, and Recent Results.       Wound Prevention Checklist    Patient: Eligio Haq (35 y.o. female)  Date: 1/22/2023  Diagnosis: COPD exacerbation ([facility] [J44.1] COPD with acute exacerbation ([facility]    Precautions: Fall       []  Heel prevention boots placed on patient    []  Patient turned q2h during shift    []  Lift team ordered    []  Patient on Kelly bed/Specialty bed    []  Each Wound is documented during shift (Stage, Color, drainage, odor, measurements, and dressings)    [x]  Dual skin check done with Ubaldo Lamar

## 2023-01-23 NOTE — PROGRESS NOTES
Infectious Disease progress Note        Reason: Perforated diverticulitis    Current abx Prior abx   Zosyn since 1/18 Azithromycin 1/13-1/18     Lines:       Assessment :  81 y.o. female with a PMHx of ESRD on HD, COPD not on home O2, CAD, COVID (7/2022), DM, HTN, HFpEF who presented to the ED on 1/13/2023 with c/o severe SOB  associated with wheezing. Clinical presentation consistent with acute hypoxic respiratory failure-present on admission due to COPD exacerbation superimposed on chronic systolic/diastolic heart failure;   Perforated sigmoid diverticulitis    Status post exploratory laparotomy, sigmoid colectomy and descending colostomy on 1/18/2023. Intraoperative findings noted  Body fluid cx 1/20- esbl e.coli, e. Faecalis, bacteroides    Persistent abdominal pain noted today-likely due to postoperative inflammation. Monitor for new infection/sepsis    Recommendations:    Continue piperacillin/tazobactam. Add gentamicin  2. Follow-up surgery recommendations  4. Monitor CBC, temperature, clinically  4. Will finalize abx regimen based on above test results, clinical course      Above plan was discussed in details with patient, RN, primary team.  Please call me if any further questions or concerns. Will continue to participate in the care of this patient. HPI:    Denies worsening abdominal pain. Denies nausea, vomiting, chest pain. Past Medical History:   Diagnosis Date    Abnormal WBC count 05/17/2016    Anemia     Bilateral shoulder pain 09/27/2016    Chondromalacia of both patellae     Chronic heart failure with preserved ejection fraction ([address] 1/13/2023    COPD (chronic obstructive pulmonary disease) ([address] 09/2015    mild-mod dz; Dr Renee Winslow    Diabetes [facility]) 2013    Diabetic eye exam ([address] 2016    Dilated cardiomyopathy ([address]     Dyslipidemia     Gout     Heart attack ([address] 10/18/2019    Stented    History of echocardiogram 11/14/2014    Mild LVE. EF 40%. Mild, diffuse hypk. Mild LAE. Mild MR. Hypercholesteremia 01/08/2014    Hypertension 2000    Noncompliance with medications 02/16/2016    Obesity     Orthostatic hypotension 05/17/2016    Osteoarthritis of both knees     Pain management 04/01/2016    Dr. Steven Napoles     Popliteal cyst, bilateral      Sleep apnea     not using cpap    Stage 3b chronic kidney disease ([facility] 08/08/2022    Vitamin D deficiency 10/16/2014       Past Surgical History:   Procedure Laterality Date    HX HEART CATHETERIZATION  2019    Coronary stent    HX TUBAL LIGATION      IR INSERT TUNL CVC W/O PORT OVER 5 YR  09/21/2022       Current Discharge Medication List        CONTINUE these medications which have NOT CHANGED    Details   albuterol (PROVENTIL HFA, VENTOLIN HFA, PROAIR HFA) 90 mcg/actuation inhaler       atorvastatin (LIPITOR) 80 mg tablet Take 80 mg by mouth nightly. OneTouch Ultra Test strip USE TO TEST BLOOD SUGAR TWICE DAILY      ezetimibe (ZETIA) 10 mg tablet Take 10 mg by mouth daily. hydrALAZINE (APRESOLINE) 100 mg tablet Take 100 mg by mouth two (2) times a day. tiZANidine (ZANAFLEX) 4 mg tablet Take 4 mg by mouth two (2) times daily as needed. metoprolol tartrate (LOPRESSOR) 25 mg tablet Take 1 Tablet by mouth two (2) times a day. Qty: 60 Tablet, Refills: 0      insulin detemir U-100 (Levemir U-100 Insulin) 100 unit/mL injection 15 units daily for diabetes once daily with lunch  Qty: 2 mL, Refills: 0    Associated Diagnoses: Diabetes mellitus type 2, insulin dependent (HCC)      gabapentin (NEURONTIN) 400 mg capsule Take 400 mg by mouth in the morning. albuterol-ipratropium (DUO-NEB) 2.5 mg-0.5 mg/3 ml nebu 3 mL by Nebulization route every six (6) hours as needed for Wheezing. Qty: 30 Nebule, Refills: 0      aspirin 81 mg chewable tablet Take 2 Tabs by mouth daily.   Qty: 60 Tab, Refills: 0    Associated Diagnoses: Coronary artery disease involving native coronary artery of native heart without angina pectoris umeclidinium-vilanterol (ANORO ELLIPTA) 62.5-25 mcg/actuation inhaler Take 1 Puff by inhalation daily. For COPD  Qty: 3 Inhaler, Refills: 3    Comments: 9/22/15 Dr Kelly Roca prescribed.  Will order via [facility] if available  Associated Diagnoses: Chronic obstructive pulmonary disease, unspecified COPD type ([facility]             Current Facility-Administered Medications   Medication Dose Route Frequency    predniSONE (DELTASONE) tablet 20 mg  20 mg Oral DAILY WITH BREAKFAST    docusate sodium (COLACE) capsule 100 mg  100 mg Oral BID    heparin (porcine) 1,000 unit/mL injection 3,800 Units  3,800 Units Hemodialysis DIALYSIS TUE, THU & SAT    tiotropium-olodateroL (STIOLTO RESPIMAT) 2.5-2.5 mcg/actuation inhaler 2 Puff  2 Puff Inhalation DAILY    albuterol (ACCUNEB) nebulizer solution 1.25 mg  1.25 mg Nebulization Q6H PRN    oxyCODONE-acetaminophen (PERCOCET) 5-325 mg per tablet 1 Tablet  1 Tablet Oral Q4H PRN    traMADoL (ULTRAM) tablet 50 mg  50 mg Oral Q6H PRN    epoetin tonny-epbx (RETACRIT) injection 4,000 Units  4,000 Units SubCUTAneous Q TUE, THU & SAT    naloxone (NARCAN) injection 0.4 mg  0.4 mg IntraVENous PRN    pantoprazole (PROTONIX) 40 mg in 0.9% sodium chloride 10 mL injection  40 mg IntraVENous Q12H    piperacillin-tazobactam (ZOSYN) 3.375 g in 0.9% sodium chloride (MBP/ADV) 100 mL MBP  3.375 g IntraVENous Q12H    HYDROmorphone (DILAUDID) injection 1 mg  1 mg IntraVENous Q3H PRN    sodium chloride (OCEAN) 0.65 % nasal squeeze bottle 2 Spray  2 Spray Both Nostrils Q2H PRN    [Held by provider] melatonin tablet 5 mg  5 mg Oral QHS    heparin (porcine) injection 5,000 Units  5,000 Units SubCUTAneous Q8H    aspirin chewable tablet 162 mg  162 mg Oral DAILY    atorvastatin (LIPITOR) tablet 80 mg  80 mg Oral QHS    ezetimibe (ZETIA) tablet 10 mg  10 mg Oral DAILY    [Held by provider] gabapentin (NEURONTIN) capsule 400 mg  400 mg Oral DAILY    sodium chloride (NS) flush 5-40 mL  5-40 mL IntraVENous Q8H    sodium chloride (NS) flush 5-40 mL  5-40 mL IntraVENous PRN    acetaminophen (TYLENOL) tablet 650 mg  650 mg Oral Q6H PRN    Or    acetaminophen (TYLENOL) suppository 650 mg  650 mg Rectal Q6H PRN    polyethylene glycol (MIRALAX) packet 17 g  17 g Oral DAILY PRN    bisacodyL (DULCOLAX) suppository 10 mg  10 mg Rectal DAILY PRN    ondansetron (ZOFRAN ODT) tablet 4 mg  4 mg Oral Q8H PRN    Or    ondansetron (ZOFRAN) injection 4 mg  4 mg IntraVENous Q6H PRN    metoprolol tartrate (LOPRESSOR) tablet 25 mg  25 mg Oral BID    insulin lispro (HUMALOG) injection   SubCUTAneous AC&HS    glucose chewable tablet 16 g  4 Tablet Oral PRN    glucagon (GLUCAGEN) injection 1 mg  1 mg IntraMUSCular PRN    dextrose 10% infusion 0-250 mL  0-250 mL IntraVENous PRN    [Held by provider] insulin glargine (LANTUS) injection 5 Units  5 Units SubCUTAneous QHS    guaiFENesin ER (MUCINEX) tablet 600 mg  600 mg Oral Q12H       Allergies: Patient has no known allergies.     Family History   Problem Relation Age of Onset    Diabetes Mother     Hypertension Mother     Hypertension Father     Kidney Disease Maternal Aunt 48        Dialysis     Social History     Socioeconomic History    Marital status:      Spouse name: Not on file    Number of children: 3    Years of education: Not on file    Highest education level: Not on file   Occupational History    Occupation: retired   Tobacco Use    Smoking status: Former     Packs/day: 1.00     Years: 52.00     Pack years: 52.00     Types: Cigarettes     Quit date: 5/20/2019     Years since quitting: 3.6    Smokeless tobacco: Never   Vaping Use    Vaping Use: Never used   Substance and Sexual Activity    Alcohol use: No     Alcohol/week: 0.0 standard drinks    Drug use: Never    Sexual activity: Yes     Partners: Male   Other Topics Concern     Service No    Blood Transfusions No    Caffeine Concern No    Occupational Exposure No    Hobby Hazards No    Sleep Concern No    Stress Concern No Weight Concern No    Special Diet No    Back Care No    Exercise No    Bike Helmet No    Seat Belt Yes    Self-Exams Yes   Social History Narrative    Not on file     Social Determinants of Health     Financial Resource Strain: Not on file   Food Insecurity: Not on file   Transportation Needs: Not on file   Physical Activity: Not on file   Stress: Not on file   Social Connections: Not on file   Intimate Partner Violence: Not on file   Housing Stability: Not on file     Social History     Tobacco Use   Smoking Status Former    Packs/day: 1.00    Years: 52.00    Pack years: 52.00    Types: Cigarettes    Quit date: 2019    Years since quitting: 3.6   Smokeless Tobacco Never        Temp (24hrs), Av.3 °F (36.8 °C), Min:97.7 °F (36.5 °C), Max:98.6 °F (37 °C)    Visit Vitals  BP (!) 155/76 (BP 1 Location: Left upper arm, BP Patient Position: At rest)   Pulse 65   Temp 97.7 °F (36.5 °C)   Resp 20   Ht 5' 4\" (1.626 m)   Wt 96 kg (211 lb 10.3 oz)   SpO2 98%   Breastfeeding Unknown   BMI 36.33 kg/m²       ROS: 12 point ROS obtained in details.  Pertinent positives as mentioned in HPI,   otherwise negative    Physical Exam:       General appearance - alert, well appearing, and in no distress, oriented to person, place, and time and overweight  Mental status - alert, oriented to person, place, and time  Eyes - pupils equal and reactive, extraocular eye movements intact  Ears - bilateral TM's and external ear canals normal  Nose - normal and patent, no erythema, discharge or polyps  Mouth - mucous membranes moist, pharynx normal without lesions  Neck - supple, no significant adenopathy  Chest - clear to auscultation, no wheezes, rales or rhonchi, symmetric air entry  Heart - normal rate, regular rhythm, normal S1, S2, no rubs, clicks or gallops  Abdomen - soft, tenderness around the surgical site, colostomy in place  Breasts -rash underneath both breast  Neurological - alert, oriented, normal speech, no focal findings or movement disorder noted  Musculoskeletal - no joint tenderness, deformity or swelling  Extremities - peripheral pulses normal, no pedal edema, no clubbing or cyanosis  Skin - rash on extremities, trunk almost completely resolved     Labs: Results:   Chemistry Recent Labs     01/23/23  0247 01/22/23  0354 01/21/23  0145   * 157* 145*   * 134* 136   K 3.9 4.0 4.7   CL 99* 100 106   CO2 26 29 25   BUN 53* 46* 70*   CREA 2.50* 2.64* 3.73*   CA 8.8 8.8 8.6   AGAP 7 5 5   BUCR 21* 17 19   ALB  --  2.8* 2.5*        CBC w/Diff Recent Labs     01/23/23  0247 01/21/23  0145   WBC 10.4 9.7   RBC 3.01* 2.81*   HGB 8.4* 7.9*   HCT 26.8* 26.1*    308   GRANS 78* 81*   LYMPH 16* 6*   EOS 0 0        Microbiology No results for input(s): CULT in the last 72 hours. RADIOLOGY:    All available imaging studies/reports in [facility] for this admission were reviewed    Total time spent >35 minutes. High complexity decision making was performed during the evaluation of this patient     Above mentioned total time spent on reviewing the case/medical record/data/notes/EMR/patient examination/documentation/coordinating care with nurse/consultants, exclusive of procedures with complex decision making performed and > 50% time spent in face to face evaluation. Disclaimer: Sections of this note are dictated utilizing voice recognition software, which may have resulted in some phonetic based errors in grammar and contents. Even though attempts were made to correct all the mistakes, some may have been missed, and remained in the body of the document. If questions arise, please contact our department.     Dr. Carlos Mccain, Infectious Disease Specialist  [phone]  January 23, 2023  11:52 AM

## 2023-01-23 NOTE — PROGRESS NOTES
Problem: Falls - Risk of  Goal: *Absence of Falls  Description: Document Josse Zeigler Fall Risk and appropriate interventions in the flowsheet.   Outcome: Progressing Towards Goal  Note: Fall Risk Interventions:            Medication Interventions: Assess postural VS orthostatic hypotension         History of Falls Interventions: Bed/chair exit alarm, Door open when patient unattended, Utilize gait belt for transfer/ambulation         Problem: Patient Education: Go to Patient Education Activity  Goal: Patient/Family Education  Outcome: Progressing Towards Goal     Problem: Patient Education: Go to Patient Education Activity  Goal: Patient/Family Education  Outcome: Progressing Towards Goal     Problem: Patient Education: Go to Patient Education Activity  Goal: Patient/Family Education  Outcome: Progressing Towards Goal     Problem: Nutrition Deficit  Goal: *Optimize nutritional status  Outcome: Progressing Towards Goal     Problem: Chronic Renal Failure  Goal: *Fluid and electrolytes stabilized  Outcome: Progressing Towards Goal     Problem: Patient Education: Go to Patient Education Activity  Goal: Patient/Family Education  Outcome: Progressing Towards Goal     Problem: Pain  Goal: *Control of Pain  Outcome: Progressing Towards Goal     Problem: Patient Education: Go to Patient Education Activity  Goal: Patient/Family Education  Outcome: Progressing Towards Goal

## 2023-01-23 NOTE — PROGRESS NOTES
[facility]  Pulmonary, Critical Care, and Sleep Medicine    Name: Benjamín Whitehead MRN: [id number]   : 1950 Hospital: [facility]   Date: 2023        Pulmonary -Critical Care: progress note    Admission Date:   2023  LOS: 10  MAR reviewed and pertinent medications noted or modified as needed    IMPRESSION:   Acute hypoxic respiratory failure - Improving  Wheezing secondary to COPD, component of cardiac asthma with chronic systolic and diastolic heart failure. Wheezing  - COPD-previous imaging with small cystic changes noted on CT scan but PFTs with predominant restrictive lung disease. She does have a history of smoking and secondhand smoke exposure and may have component of  \"cardiac asthma\" in the setting of chronic systolic/diastolic heart failure, favor the latter Echo from 2015 with EF 45% and mild to moderate mitral regurgitation. CXR per my review notes some vascular congestion at bases RIGHT > LEFT  Dysphonia: Persistent Upper airway sounds: Possible soft tissue and/or Vocal Cord Dysfunction  Abdominal pain-new symptoms with CT scan showing possible perforation- now s/p ex lap with sigmoid colectomy end colostomy 23  Peritonitis- fluid cultures polymicrobial, enteric organism- see below on Cx resutls  Hypertension: CLVH on Echo  CHF: HFpEF: Diastolic dysfunction noted on echo, history of CAD with STEMI in 2018  TESSA  Was previously followed by Dr. Estefana Primrose- Lost to follow up and has not been seen for an extended period of time. DM 2  ESRD- HD dependent  Former smoker 46 PY, quit in 2019, + 2nd hand smoke exposure from   Chronic pain syndrome  Morbid Obesity Body mass index is 41.81 kg/m²  H/O COVID Infection .   H/O Abnormal CT Chest Imagin2022- diffuse asymmetric GGOs and atelectasis- most likely from pulmonary edema- Resolved on CT Chest 22         RECOMMENDATIONS:     Continue Stiolto  Albuterol nebs prn  Change stress dose steroids back to prednisone 20 mg daily for 1 more day. No upper airway sounds over VC/neck region currently. Patient also ex-smoker- consider future ENT evaluation. - No issues noted by anesthesia dur OR case on 1/18/23- Continue to monitor and reassess  Maintain aspiration precautions: HOB >30 degrees  SpO2 goal >92%  Hold on PAP therapy due to recent bowel surgery  Bronchial /oral hygiene; IS at bedside please  Monitor I&Os  Antiinfective therapy per primary and ID  HD per nephrology  Electrolyte management, prophylaxis and glycemic control per primary and respective consultants  OP Pulmonary- Sleep follow up  Will follow. CODE STATUS: Full Code        Subjective/History: This patient has been seen and evaluated at the request of Dr. Danita Gambino for COPD exacerbation. HPI  Patient is a 81 y.o. female  PMHx of ESRD on HD, COPD not on home O2, CAD, DM, HTN, HFpEF. The patient has been seen by our group as an in-patient. She has not followed up with any Pulmonologist as an OP. The patient states she did not realized she needed to follow up with Pulmonary but that she will in the future. The patient reports increasing shortness of breath and wheezing over the past week- more significant after her HD session. She reports that she had to cut her run 15 minutes early so she could get her medial ride. When she got home she became more short of breath with wheeling. She also noted nausea without emesis. No known sick contacts. No fever or chills. No recurrent nausea. No emesis,  No chest pain  She continues to refrain from smoking, but her  does smoke with in and out side the home. Inhalers: Anoro and PRN albuterol nebs  Respiratory Triggers: smoke and cold air  Pets:  2 has 4: small dogs: saviua -poodle mixes- they do not sleep with patient and have their own room  Home: No known water leaks, no bug infestations  Obstructive Sleep Apnea (TESSA): The patient has history of TESSA.   She was previously followed by Dr. Meghan Whittaker. She reports that she had a recalled [employer] PAP device. Her DME was [facility]. She reports that she did register her device with [employer], but never got a replacement. The patient stopped using her device and actually brought her device back to [facility]. She has not used PAP therapy for an extended period of time. She is HD dependant. She denies any diet indiscretions. The patient had ID earlier today in our HD unit. She has a THDC in her right upper chest wall.   She had a AVF created a few weeks ago and is not ready for use    Interval Evaluation:   01/23/23  LOS: 10    Patient is POD 5 from ex-lap with sigmoid colectomy and colostomy.- Stables from midline incision in place  Sitting up breathing comfortably on RA  Abd pain improving, but not resolved  She states her breathing is stable, no complaints  No chest pain  No hemoptysis  Doing well with stiolto instead of scheduled nebs/brovana  Vital signs noted below- afebrile, persistent hypertension          Patient Vitals for the past 24 hrs:   Temp Pulse Resp BP SpO2   01/23/23 1137 97.8 °F (36.6 °C) 64 18 (!) 154/71 97 %   01/23/23 0835 98.2 °F (36.8 °C) 67 20 (!) 166/69 97 %   01/23/23 0425 97.7 °F (36.5 °C) 65 20 (!) 155/76 98 %   01/23/23 0015 98.1 °F (36.7 °C) 67 20 (!) 152/69 97 %   01/22/23 2136 98.6 °F (37 °C) 67 20 (!) 160/82 97 %   01/22/23 1528 98.4 °F (36.9 °C) 74 20 (!) 158/75 97 %     Inpat Anti-Infectives (From admission, onward)       Start     Ordered Stop    01/18/23 2000  piperacillin-tazobactam (ZOSYN) 3.375 g in 0.9% sodium chloride (MBP/ADV) 100 mL MBP  3.375 g,   IntraVENous,   EVERY 12 HOURS         01/18/23 1249 --                      Past Medical History:   Diagnosis Date    Abnormal WBC count 05/17/2016    Anemia     Bilateral shoulder pain 09/27/2016    Chondromalacia of both patellae     Chronic heart failure with preserved ejection fraction ([address] 1/13/2023    COPD (chronic obstructive pulmonary disease) ([address] 09/2015    mild-mod dz; Dr Mick Escobar    Diabetes [facility]) 2013    Diabetic eye exam ([address] 2016    Dilated cardiomyopathy ([address]     Dyslipidemia     Gout     Heart attack ([address] 10/18/2019    Stented    History of echocardiogram 11/14/2014    Mild LVE. EF 40%. Mild, diffuse hypk. Mild LAE. Mild MR.       Hypercholesteremia 01/08/2014    Hypertension 2000    Noncompliance with medications 02/16/2016    Obesity     Orthostatic hypotension 05/17/2016    Osteoarthritis of both knees     Pain management 04/01/2016    Dr. Alejandra Bagley     Popliteal cyst, bilateral      Sleep apnea     not using cpap    Stage 3b chronic kidney disease ([address] 08/08/2022    Vitamin D deficiency 10/16/2014      Past Surgical History:   Procedure Laterality Date    HX HEART CATHETERIZATION  2019    Coronary stent    HX TUBAL LIGATION      IR INSERT TUNL CVC W/O PORT OVER 5 YR  09/21/2022        Current Facility-Administered Medications   Medication Dose Route Frequency    predniSONE (DELTASONE) tablet 20 mg  20 mg Oral DAILY WITH BREAKFAST    docusate sodium (COLACE) capsule 100 mg  100 mg Oral BID    heparin (porcine) 1,000 unit/mL injection 3,800 Units  3,800 Units Hemodialysis DIALYSIS TUE, THU & SAT    tiotropium-olodateroL (STIOLTO RESPIMAT) 2.5-2.5 mcg/actuation inhaler 2 Puff  2 Puff Inhalation DAILY    epoetin tonny-epbx (RETACRIT) injection 4,000 Units  4,000 Units SubCUTAneous Q TUE, THU & SAT    pantoprazole (PROTONIX) 40 mg in 0.9% sodium chloride 10 mL injection  40 mg IntraVENous Q12H    piperacillin-tazobactam (ZOSYN) 3.375 g in 0.9% sodium chloride (MBP/ADV) 100 mL MBP  3.375 g IntraVENous Q12H    [Held by provider] melatonin tablet 5 mg  5 mg Oral QHS    heparin (porcine) injection 5,000 Units  5,000 Units SubCUTAneous Q8H    aspirin chewable tablet 162 mg  162 mg Oral DAILY    atorvastatin (LIPITOR) tablet 80 mg  80 mg Oral QHS    ezetimibe (ZETIA) tablet 10 mg  10 mg Oral DAILY    [Held by provider] gabapentin (NEURONTIN) capsule 400 mg  400 mg Oral DAILY    sodium chloride (NS) flush 5-40 mL  5-40 mL IntraVENous Q8H    metoprolol tartrate (LOPRESSOR) tablet 25 mg  25 mg Oral BID    insulin lispro (HUMALOG) injection   SubCUTAneous AC&HS    [Held by provider] insulin glargine (LANTUS) injection 5 Units  5 Units SubCUTAneous QHS    guaiFENesin ER (MUCINEX) tablet 600 mg  600 mg Oral Q12H     No Known Allergies     Review of Systems:  A comprehensive review of systems was negative except for that written in the HPI. Objective:   Vital Signs:    Visit Vitals  BP (!) 154/71 (BP 1 Location: Left upper arm, BP Patient Position: Sitting)   Pulse 64   Temp 97.8 °F (36.6 °C)   Resp 18   Ht 5' 4\" (1.626 m)   Wt 96 kg (211 lb 10.3 oz)   SpO2 97%   Breastfeeding Unknown   BMI 36.33 kg/m²       O2 Device: None (Room air)   O2 Flow Rate (L/min): 2 l/min   Temp (24hrs), Av.1 °F (36.7 °C), Min:97.7 °F (36.5 °C), Max:98.6 °F (37 °C)       Intake/Output:   Last shift:       0701 -  1900  In: 840 [P.O.:840]  Out: 550 [Urine:550]  Last 3 shifts:  190 -  0700  In: 240 [P.O.:240]  Out: 1410 [Urine:1400]    Intake/Output Summary (Last 24 hours) at 2023 1344  Last data filed at 2023 1138  Gross per 24 hour   Intake 960 ml   Output 1650 ml   Net -690 ml         Physical Exam:    General:  Alert, cooperative, no distress, appears stated age + Obese body habitus   Head:  Normocephalic, without obvious abnormality, atraumatic. Eyes:  Conjunctivae/corneas clear. PERRL, EOMs intact. Nose: Nares normal. Septum midline. Mucosa normal. No drainage or sinus tenderness. Throat: Lips, mucosa, and tongue normal. No teeth   Neck: Supple, symmetrical, trachea midline, no adenopathy, thyroid: no enlargment/tenderness/nodules, no carotid bruit and no JVD. Back:   Symmetric,   Lungs:   Bilateral breath sounds present. Clear to auscultation   Chest wall:  THDC right upper chest wall.  No tenderness or deformity.- No crepitus Heart:  Regular rate and rhythm, S1, S2 normal, no murmur, click, rub or gallop. Abdomen:   Soft,  Obese, surgical staples and colostomy in place   Extremities: Extremities normal, atraumatic, no cyanosis or edema. - unmatured AFV right arm- + palpable thrill   Pulses: 2+ and symmetric all extremities. Skin: Skin color, texture, turgor normal. No rashes or lesions   Lymph nodes:      Cervical, supraclavicular nodes: normal on palpation   Neurologic: Grossly nonfocal       Data:     Recent Labs     01/23/23  0247 01/21/23  0145   WBC 10.4 9.7   HGB 8.4* 7.9*   HCT 26.8* 26.1*    308        Latest Reference Range & Units 12/9/22 12:48 12/10/22 04:27 12/11/22 02:46 12/12/22 04:35 12/13/22 01:46 1/13/23 09:55   EOSINOPHILS 0 - 5 % 3 3 2 2 2 1   ABS. EOSINOPHILS 0.0 - 0.4 K/UL 0.1 0.1 0.1 0.1 0.1 0.0         Recent Labs     01/23/23  0247 01/22/23  0354 01/21/23  0145   * 134* 136   K 3.9 4.0 4.7   CL 99* 100 106   CO2 26 29 25   * 157* 145*   BUN 53* 46* 70*   CREA 2.50* 2.64* 3.73*   CA 8.8 8.8 8.6   MG 2.1  --  2.3   PHOS 3.6 4.2 6.0*   ALB  --  2.8* 2.5*       Lab Results   Component Value Date/Time    NT pro-BNP 1,057 (H) 10/08/2022 05:15 PM    NT pro-BNP 2,013 (H) 09/09/2022 07:34 AM    NT pro-BNP 1,871 (H) 08/08/2022 01:48 AM    NT pro-BNP 3,952 (H) 07/08/2022 09:53 AM    NT pro-BNP 1,020 (H) 03/18/2021 01:29 AM     No results for input(s): PH, PCO2, PO2, HCO3, FIO2 in the last 72 hours. No results for input(s): FIO2I, IFO2, HCO3I, IHCO3, HCOPOC, PCO2I, PCOPOC, IPHI, PHI, PHPOC, PO2I, PO2POC in the last 72 hours.     No lab exists for component: IPOC2      MICRO:  Results       Procedure Component Value Units Date/Time    CULTURE, BODY FLUID Stephy Bull STAIN [[id number]]  (Abnormal)  (Susceptibility) Collected: 01/19/23 0888    Order Status: Completed Specimen: Abdominal Fluid Updated: 01/23/23 8093     Special Requests: NO SPECIAL REQUESTS        GRAM STAIN FEW WBCS SEEN         4+ GRAM POSITIVE RODS         4+ Gram negative rods               2+ Gram positive cocci IN PAIRS           Culture result:       MODERATE Escherichia coli ** (EXTENDED SPECTRUM BETA LACTAMASE ) **                  HEAVY Enterococcus faecalis                  HEAVY BACTEROIDES SPECIES BETA LACTAMASE POSITIVE          Susceptibility        Escherichia coli      STEPHANIE      Amikacin ($) Susceptible      Ampicillin ($) Resistant      Ampicillin/sulbactam ($) Susceptible      Cefazolin ($) Resistant      Cefepime ($$) Resistant      Cefoxitin Intermediate      Ceftazidime ($) Resistant      Ceftriaxone ($) Resistant      Ciprofloxacin ($) Resistant      Gentamicin ($) Susceptible      Levofloxacin ($) Resistant      Meropenem ($$) Susceptible      Piperacillin/Tazobac ($) Susceptible      Tobramycin ($) Susceptible      Trimeth/Sulfa Resistant                       Susceptibility        Enterococcus faecalis      STEPHANIE      Ampicillin ($) Susceptible      Daptomycin ($$$$$) Susceptible      Linezolid ($$$$$) Susceptible      Vancomycin ($) Susceptible                           RESPIRATORY VIRUS PANEL W/COVID-19, PCR [[id number]] Collected: 01/13/23 2692    Order Status: Completed Specimen: Nasopharyngeal Updated: 01/13/23 8566     Adenovirus Not detected        Coronavirus 229E Not detected        Coronavirus HKU1 Not detected        Coronavirus CVNL63 Not detected        Coronavirus OC43 Not detected        SARS-CoV-2, PCR Not detected        Metapneumovirus Not detected        Rhinovirus and Enterovirus Not detected        Influenza A Not detected        Influenza A, subtype H1 Not detected        Influenza A, subtype H3 Not detected        INFLUENZA A H1N1 PCR Not detected        Influenza B Not detected        Parainfluenza 1 Not detected        Parainfluenza 2 Not detected        Parainfluenza 3 Not detected        Parainfluenza virus 4 Not detected        RSV by PCR Not detected        B. parapertussis, PCR Not detected Bordetella pertussis - PCR Not detected        Chlamydophila pneumoniae DNA, QL, PCR Not detected        Mycoplasma pneumoniae DNA, QL, PCR Not detected                 CARDIO:    EKG Results       Procedure 720 Value Units Date/Time    EKG, 12 LEAD, INITIAL [[id number]] Collected: 01/13/23 0942    Order Status: Completed Updated: 01/13/23 1856     Ventricular Rate 98 BPM      Atrial Rate 98 BPM      P-R Interval 160 ms      QRS Duration 78 ms      Q-T Interval 360 ms      QTC Calculation (Bezet) 459 ms      Calculated P Axis 74 degrees      Calculated R Axis 20 degrees      Calculated T Axis 86 degrees      Diagnosis --     Normal sinus rhythm  Left atrial enlargement  Minimal voltage criteria for LVH, may be normal variant ( [place] product )  Borderline ECG  When compared with ECG of 08-DEC-2022 12:19,  No significant change was found  Confirmed by Eliseo Kan ([id number]) on 1/13/2023 6:56:19 PM              06/25/22    ECHO ADULT COMPLETE 06/29/2022 6/29/2022    Interpretation Summary    Left Ventricle: Mildly reduced left ventricular systolic function with a visually estimated EF of 50 - 55%. Left ventricle is mildly dilated. Increased wall thickness. Findings consistent with mild concentric hypertrophy. See diagram for wall motion findings. Diastolic dysfunction present with normal LV EF. Mitral Valve: Mildly thickened leaflet. Mild regurgitation with a centrally directed jet. Signed by: Haile Martin MD on 6/29/2022 12:12 PM    Imaging:  I have personally reviewed the patients radiographs and have reviewed the reports:                  Chest CT:  9/20/22  IMPRESSION     1. Interval resolution of extensive bilateral lung airspace disease. 2.  Interval resolved small bilateral pleural effusions and mild cardiomegaly. 3.  Interval resolved lymphadenopathy. 4.  Chronic interstitial reticular and cystic changes of the lungs appear  stable.      Thank you for your referral.            Complex decision making was made in the evaluation and management plans during this consultation. More than 50% of time was spent in counseling and coordination of care including review of data and discussion with other team members. Mak Cabello.  Tomasa Hendrickson, [facility]  Pulmonary, Critical Care, and Sleep Medicine

## 2023-01-23 NOTE — PROGRESS NOTES
RENAL DAILY PROGRESS NOTE              Subjective:       Complaint:   Overnight events noted  no nausea, vomiting, chest pain  Breathing is better      IMPRESSION:   IMPRESSION:   ESRD TTS  Access: TDC,maturing av fistula on right  arm(placed 3 weeks ago-awaiting rpt procedure for superfilization)   Abdominal perforation due to diverticulitis. s/p procedure and colostomy  COPD excerebration. CAD,cardiomyopathy  Secondary hyperparathyroidism  ACD   PLAN:   Will keep her on  dialysis. tue thurs sat  C/w epogen  Diet per surgery  Avoid Gadolinium due to its association with nephrogenic systemic fibrosis in a patients with severe ARF and ESRD. Please dose all medications for creatinine clearance <15/dialysis. Avoid blood pressure checks, blood draws, peripheral iv's on arm with access.                  Current Facility-Administered Medications   Medication Dose Route Frequency    predniSONE (DELTASONE) tablet 20 mg  20 mg Oral DAILY WITH BREAKFAST    docusate sodium (COLACE) capsule 100 mg  100 mg Oral BID    heparin (porcine) 1,000 unit/mL injection 3,800 Units  3,800 Units Hemodialysis DIALYSIS TUE, THU & SAT    tiotropium-olodateroL (STIOLTO RESPIMAT) 2.5-2.5 mcg/actuation inhaler 2 Puff  2 Puff Inhalation DAILY    albuterol (ACCUNEB) nebulizer solution 1.25 mg  1.25 mg Nebulization Q6H PRN    oxyCODONE-acetaminophen (PERCOCET) 5-325 mg per tablet 1 Tablet  1 Tablet Oral Q4H PRN    traMADoL (ULTRAM) tablet 50 mg  50 mg Oral Q6H PRN    epoetin tonny-epbx (RETACRIT) injection 4,000 Units  4,000 Units SubCUTAneous Q TUE, THU & SAT    naloxone (NARCAN) injection 0.4 mg  0.4 mg IntraVENous PRN    pantoprazole (PROTONIX) 40 mg in 0.9% sodium chloride 10 mL injection  40 mg IntraVENous Q12H    piperacillin-tazobactam (ZOSYN) 3.375 g in 0.9% sodium chloride (MBP/ADV) 100 mL MBP  3.375 g IntraVENous Q12H    HYDROmorphone (DILAUDID) injection 1 mg  1 mg IntraVENous Q3H PRN    sodium chloride (OCEAN) 0.65 % nasal squeeze bottle 2 Spray  2 Spray Both Nostrils Q2H PRN    [Held by provider] melatonin tablet 5 mg  5 mg Oral QHS    heparin (porcine) injection 5,000 Units  5,000 Units SubCUTAneous Q8H    aspirin chewable tablet 162 mg  162 mg Oral DAILY    atorvastatin (LIPITOR) tablet 80 mg  80 mg Oral QHS    ezetimibe (ZETIA) tablet 10 mg  10 mg Oral DAILY    [Held by provider] gabapentin (NEURONTIN) capsule 400 mg  400 mg Oral DAILY    sodium chloride (NS) flush 5-40 mL  5-40 mL IntraVENous Q8H    sodium chloride (NS) flush 5-40 mL  5-40 mL IntraVENous PRN    acetaminophen (TYLENOL) tablet 650 mg  650 mg Oral Q6H PRN    Or    acetaminophen (TYLENOL) suppository 650 mg  650 mg Rectal Q6H PRN    polyethylene glycol (MIRALAX) packet 17 g  17 g Oral DAILY PRN    bisacodyL (DULCOLAX) suppository 10 mg  10 mg Rectal DAILY PRN    ondansetron (ZOFRAN ODT) tablet 4 mg  4 mg Oral Q8H PRN    Or    ondansetron (ZOFRAN) injection 4 mg  4 mg IntraVENous Q6H PRN    metoprolol tartrate (LOPRESSOR) tablet 25 mg  25 mg Oral BID    insulin lispro (HUMALOG) injection   SubCUTAneous AC&HS    glucose chewable tablet 16 g  4 Tablet Oral PRN    glucagon (GLUCAGEN) injection 1 mg  1 mg IntraMUSCular PRN    dextrose 10% infusion 0-250 mL  0-250 mL IntraVENous PRN    [Held by provider] insulin glargine (LANTUS) injection 5 Units  5 Units SubCUTAneous QHS    guaiFENesin ER (MUCINEX) tablet 600 mg  600 mg Oral Q12H       Review of Symptoms: comprehensive ROS negative except above.    Objective:   Patient Vitals for the past 24 hrs:   Temp Pulse Resp BP SpO2   01/23/23 0835 98.2 °F (36.8 °C) 67 20 (!) 166/69 97 %   01/23/23 0425 97.7 °F (36.5 °C) 65 20 (!) 155/76 98 %   01/23/23 0015 98.1 °F (36.7 °C) 67 20 (!) 152/69 97 %   01/22/23 2136 98.6 °F (37 °C) 67 20 (!) 160/82 97 %   01/22/23 1528 98.4 °F (36.9 °C) 74 20 (!) 158/75 97 %   01/22/23 1208 98.5 °F (36.9 °C) 88 20 (!) 117/90 98 %          Weight change: 0.2 kg (7.1 oz)     01/21 1901 - 01/23 0700  In: 240 [P.O.:240]  Out: 1410 [Urine:1400]    Intake/Output Summary (Last 24 hours) at 1/23/2023 1132  Last data filed at 1/23/2023 0944  Gross per 24 hour   Intake 960 ml   Output 1400 ml   Net -440 ml       Physical Exam:   General: comfortable, no acute distress   HEENT sclera anicteric, supple neck, no thyromegaly  CVS: S1S2 heard,  no rub  RS: + air entry b/l  Abd: Soft,colostomy  Neuro: non focal, awake, alert , CN II-XII are grossly intact  Extrm: no edema, no cyanosis, clubbing   Skin: no visible  Rash  Musculoskeletal: No gross joints or bone deformities   Access: maturing right arm access      Data Review:     LABS:   Hematology:   Recent Labs     01/23/23 0247 01/21/23  0145   WBC 10.4 9.7   HGB 8.4* 7.9*   HCT 26.8* 26.1*       Chemistry:   Recent Labs     01/23/23 0247 01/22/23  0354 01/21/23 0145   BUN 53* 46* 70*   CREA 2.50* 2.64* 3.73*   CA 8.8 8.8 8.6   ALB  --  2.8* 2.5*   K 3.9 4.0 4.7   * 134* 136   CL 99* 100 106   CO2 26 29 25   PHOS 3.6 4.2 6.0*   * 157* 145*              Procedures/imaging: see electronic medical records for all procedures, Xrays and details which were not copied into this note but were reviewed prior to creation of Plan          Assessment & Plan:       See above      Joni Lerma MD  1/23/2023

## 2023-01-23 NOTE — PROGRESS NOTES
Problem: Mobility Impaired (Adult and Pediatric)  Goal: *Acute Goals and Plan of Care (Insert Text)  Description: Physical Therapy Goals  Initiated 1/15/2023, 1/23/23 and to be accomplished within 7 day(s)  1. Patient will move from supine to sit and sit to supine  in bed with modified independence. 2.  Patient will transfer from bed to chair and chair to bed with modified independence using the least restrictive device. 3.  Patient will perform sit to stand with modified independence. 4.  Patient will ambulate with modified independence for 100 feet with the least restrictive device. 5.  Patient will ascend/descend 3 stairs with (U) handrail(s) with modified independence. PLOF: Independent, occasionally used a SPC or RW if needed.  can assist.      Outcome: Progressing Towards Goal     PHYSICAL THERAPY RE-EVALUATION    Patient: Lisa Flaherty (62 y.o. female)  Date: 1/23/2023  Primary Diagnosis: COPD exacerbation (HCC) [J44.1]  Procedure(s) (LRB):  LAPAROTOMY EXPLORATORY, COLECTOMY, COLOSTOMY CREATION (N/A) 5 Days Post-Op   Precautions:  Fall    ASSESSMENT :  Based on the objective data described below, the patient presents with generalized weakness and decreased functional mobility tolerance. RN cleared for mobility. Pt found sitting up in chair, in NAD, willing to work with PT. She reports feeling better and wants to go home. She refused amb with a RW. Pt stood with CGA and amb around the room with slow but steady gait. She returned back to chair for exercises per flow sheet RN in room at this time. Pt left sitting up with chair alarm on. Patient will benefit from skilled intervention to address the above impairments.   Patient's rehabilitation potential is considered to be Good  Factors which may influence rehabilitation potential include:   []         None noted  []         Mental ability/status  [x]         Medical condition  []         Home/family situation and support systems  [x] Safety awareness  []         Pain tolerance/management  []         Other:      PLAN :  Recommendations and Planned Interventions:   [x]           Bed Mobility Training             [x]    Neuromuscular Re-Education  [x]           Transfer Training                   []    Orthotic/Prosthetic Training  [x]           Gait Training                          []    Modalities  [x]           Therapeutic Exercises           []    Edema Management/Control  [x]           Therapeutic Activities            [x]    Family Training/Education  [x]           Patient Education  []           Other (comment):    Frequency/Duration: Patient will be followed by physical therapy 1-2 times per day/4-7 days per week to address goals. Further Equipment Recommendations for Discharge: N/A    AMPAC: Current research shows that an AM-PAC score of 18 (14 without stairs) or greater is associated with a discharge to the patient's home setting. Based on an AM-PAC score of 18/24 (or **/20 if omitting stairs) and their current functional mobility deficits, it is recommended that the patient have 2-3 sessions per week of Physical Therapy at d/c to increase the patient's independence. This AMPAC score should be considered in conjunction with interdisciplinary team recommendations to determine the most appropriate discharge setting. Patient's social support, diagnosis, medical stability, and prior level of function should also be taken into consideration. SUBJECTIVE:   Patient stated I've got a walker. I am NOT going to rehab.     OBJECTIVE DATA SUMMARY:   Hospital course since last seen and reason for re-evaluation: Pt has slowly progressed with therapy, but still has decreased endurance for functional mobility. She continues to benefit from acute PT.   Past Medical History:   Diagnosis Date    Abnormal WBC count 05/17/2016    Anemia     Bilateral shoulder pain 09/27/2016    Chondromalacia of both patellae     Chronic heart failure with preserved ejection fraction ([facility] 1/13/2023    COPD (chronic obstructive pulmonary disease) ([facility] 09/2015    mild-mod dz; Dr Mark Jeter    Diabetes [facility]) 2013    Diabetic eye exam ([facility] 2016    Dilated cardiomyopathy ([facility]     Dyslipidemia     Gout     Heart attack ([facility] 10/18/2019    Stented    History of echocardiogram 11/14/2014    Mild LVE. EF 40%. Mild, diffuse hypk. Mild LAE. Mild MR. Hypercholesteremia 01/08/2014    Hypertension 2000    Noncompliance with medications 02/16/2016    Obesity     Orthostatic hypotension 05/17/2016    Osteoarthritis of both knees     Pain management 04/01/2016    Dr. Martin Elliott     Popliteal cyst, bilateral      Sleep apnea     not using cpap    Stage 3b chronic kidney disease ([facility] 08/08/2022    Vitamin D deficiency 10/16/2014     Past Surgical History:   Procedure Laterality Date    HX HEART CATHETERIZATION  2019    Coronary stent    HX TUBAL LIGATION      IR INSERT TUNL CVC W/O PORT OVER 5 YR  09/21/2022     Barriers to Learning/Limitations: None  Compensate with: N/A  Home Situation:   Home Situation  Home Environment: Private residence  # Steps to Enter: 3  Rails to Enter: Yes  Hand Rails : Bilateral  One/Two Story Residence: One story  Living Alone: No  Support Systems: Spouse/Significant Other  Patient Expects to be Discharged to[de-identified] Home with family assistance  Current DME Used/Available at Home: Cane, straight  Tub or Shower Type: Tub/Shower combination  Critical Behavior:  Neurologic State: Alert  Orientation Level: Oriented X4  Cognition: Follows commands  Safety/Judgement: Fall prevention;Home safety  Psychosocial  Patient Behaviors: Calm; Cooperative  Purposeful Interaction: Yes  Pt Identified Daily Priority: Clinical issues (comment)  Car[employer] Process: Nurture loving kindness; Teaching/learning;Create healing environment; Attend basic human needs  Caring Interventions: Therapeutic modalities; Reassure  Reassure: Informing;Caring rounds  Therapeutic Modalities: Deep breathing; Intentional therapeutic touch  Skin Condition/Temp: Dry;Warm     Skin Integrity: Incision (comment)  Skin Integumentary  Skin Color: Ecchymosis (comment)  Skin Condition/Temp: Dry;Warm  Skin Integrity: Incision (comment)  Turgor: Non-tenting  Hair Growth: Present  Varicosities: Absent     Strength:    Strength: Generally decreased, functional        Tone & Sensation:   Tone: Normal       Sensation: Intact        Range Of Motion:  AROM: Within functional limits       Functional Mobility:  Bed Mobility:     Supine to Sit:  (pt sitting EOB upon OT arrival to room)     Scooting: Stand-by assistance  Transfers:  Sit to Stand: Contact guard assistance  Stand to Sit: Contact guard assistance        Balance:   Sitting: Intact; With support  Standing: Impaired  Standing - Static: Good  Standing - Dynamic : Fair        Ambulation/Gait Training:  Distance (ft): 40 Feet (ft)  Assistive Device: Other (comment) (none)  Ambulation - Level of Assistance: Contact guard assistance        Gait Abnormalities: Decreased step clearance    Speed/Venus: Pace decreased (<100 feet/min)  Step Length: Right shortened;Left shortened       Therapeutic Exercises:   Pt performed x15 LAQ's, marches, ankle pumps    Pain:  Pain level pre-treatment: 4/10   Pain level post-treatment: 4/10   Pain Intervention(s) : Medication (see MAR); Rest, Ice, Repositioning   Response to intervention: Nurse notified, See doc flow    Activity Tolerance:   Pt tolerated mobility well  Please refer to the flowsheet for vital signs taken during this treatment. After treatment:   [x]         Patient left in no apparent distress sitting up in chair  []         Patient left in no apparent distress in bed  [x]         Call bell left within reach  [x]         Nursing notified  []         Caregiver present  [x]        Chair alarm activated  []         SCDs applied    COMMUNICATION/EDUCATION:   [x]         Role of Physical Therapy in the acute care setting.   [x] Fall prevention education was provided and the patient/caregiver indicated understanding. [x]         Patient/family have participated as able in goal setting and plan of care. []         Patient/family agree to work toward stated goals and plan of care. []         Patient understands intent and goals of therapy, but is neutral about his/her participation. []         Patient is unable to participate in goal setting/plan of care: ongoing with therapy staff.  []         Other: Thank you for this referral.  Wero Eloymariela   Time Calculation: 16 mins    [employer] AM-PAC® Basic Mobility Inpatient Short Form (6-Clicks) Version 2    How much HELP from another person does the patient currently need    (If the patient hasn't done an activity recently, how much help from another person do you think he/she would need if he/she tried?)   Total (Total A or Dep)   A Lot  (Mod to Max A)   A Little (Sup or Min A)   None (Mod I to I)   Turning from your back to your side while in a flat bed without using bedrails? [] 1 [] 2 [x] 3 [] 4   2. Moving from lying on your back to sitting on the side of a flat bed without using bedrails? [] 1 [] 2 [x] 3 [] 4   3. Moving to and from a bed to a chair (including a wheelchair)? [] 1 [] 2 [x] 3 [] 4   4. Standing up from a chair using your arms (e.g., wheelchair, or bedside chair)? [] 1 [] 2 [x] 3 [] 4   5. Walking in hospital room? [] 1 [] 2 [x] 3 [] 4   6. Climbing 3-5 steps with a railing?+   [] 1 [] 2 [x] 3 [] 4   +If stair climbing cannot be assessed, skip item #6. Sum responses from items 1-5. Current research shows that an AM-PAC score of 18 (14 without stairs) or greater is associated with a discharge to the patient's home setting.  Based on an AM-PAC score of 18/24 (or **/20 if omitting stairs) and their current functional mobility deficits, it is recommended that the patient have 2-3 sessions per week of Physical Therapy at d/c to increase the patient's independence.

## 2023-01-23 NOTE — PROGRESS NOTES
Patient seen and examined. She is doing well. Complaining of some incisional pain. She stated that she is on clear liquid diet and she would like some solid food. Her vitals are normal with no fever or tachycardia. Her abdomen is soft and nontender and her midline wound is clean. I did remove the dressing. Her colostomy has some gas and small amount of stool and it is dusky but functioning.     Plan:  I will advance the patient to full liquid diet but will not give her regular diet yet  Dialysis per nephrology  Continue with the antibiotics for now  Ambulation

## 2023-01-23 NOTE — PROGRESS NOTES
Problem: Self Care Deficits Care Plan (Adult)  Goal: *Acute Goals and Plan of Care (Insert Text)  Description: Occupational Therapy Goals  Initiated 1/15/2023, Re-evaluated 1/23/2023 within 7 day(s). Goals to be continued    1. Patient will perform upper body dressing with modified independence. 2.  Patient will perform bathing with modified independence. 3.  Patient will maneuver on and off BSC and perform toileting tasks with modified independence. 4.  Patient will perform and lower body dressing with modified independence. Prior Level of Function: she reports she is independent with her self care and has significant SOB and difficulty with task completion. She lives with her  who assists her with her IADL's  Outcome: Progressing Towards Goal     OCCUPATIONAL THERAPY RE- EVALUATION    Patient: Nir Winkler [de-identified]81 y.o. female)  Date: 1/23/2023  Primary Diagnosis: COPD exacerbation ([facility] [J44.1]  Procedure(s) (LRB):  LAPAROTOMY EXPLORATORY, COLECTOMY, COLOSTOMY CREATION (N/A) 5 Days Post-Op   Precautions:  Fall      ASSESSMENT :  Patient cleared to participate in OT re-evaluation by RN. Upon entering the room, patient was seated edge of bed finishing breakfast with SCDs donned. Patient agreeable to participate in OT re-evaluation and getting OOB to [facility]. Patient contact guard assist for toilet transfer and able to perform toileting tasks with contact guard assistance standing. Patient stand by assist for grooming/donning gown onto backside. Patient requesting to sit OOB this morning, chair alarm obtained and patient able to ambulate to chair from [facility] with contact guard assistance. Patient oriented this session but presents with intermittent confusion, unable to complete thoughts/think of sentences. RN notified of this.  Based on the objective data described below, the patient presented with decreased independence, decreased strength, decreased endurance, decreased functional balance, and decreased functional mobility, which impede pt's function with basic self-care/ADL tasks. Patient will benefit from skilled intervention to address the above impairments. Patient's rehabilitation potential is considered to be Fair  Factors which may influence rehabilitation potential include:   []             None noted  [x]             Mental ability/status  [x]             Medical condition  [x]             Home/family situation and support systems  [x]             Safety awareness  [x]             Pain tolerance/management  []             Other:      PLAN :  Recommendations and Planned Interventions:   [x]               Self Care Training                  [x]      Therapeutic Activities  [x]               Functional Mobility Training   []      Cognitive Retraining  [x]               Therapeutic Exercises           [x]      Endurance Activities  [x]               Balance Training                    [x]      Neuromuscular Re-Education  []               Visual/Perceptual Training     [x]      Home Safety Training  [x]               Patient Education                   [x]      Family Training/Education  []               Other (comment):    Frequency/Duration: Patient will be followed by occupational therapy 3 - 5 times a week to address goals. Further Equipment Recommendations for Discharge: shower chair, rolling walker    AMPAC: Current research shows that an AM-PAC score of 18 or greater is associated with a discharge to the patient's home setting. Based on an AM-PAC score of 19/24 and their current ADL deficits; it is recommended that the patient have 2-3 sessions per week of Occupational Therapy at d/c to increase the patient's independence and increased family supervision. This AMPAC score should be considered in conjunction with interdisciplinary team recommendations to determine the most appropriate discharge setting.  Patient's social support, diagnosis, medical stability, and prior level of function should also be taken into consideration. SUBJECTIVE:   Patient stated im feeling better but my pain is still a 10    OBJECTIVE DATA SUMMARY:     Past Medical History:   Diagnosis Date    Abnormal WBC count 05/17/2016    Anemia     Bilateral shoulder pain 09/27/2016    Chondromalacia of both patellae     Chronic heart failure with preserved ejection fraction ([facility] 1/13/2023    COPD (chronic obstructive pulmonary disease) ([facility] 09/2015    mild-mod dz; Dr Mari Lindsey    Diabetes [facility]) 2013    [facility] 2016    Dilated cardiomyopathy ([facility]     Dyslipidemia     Gout     Heart attack ([facility] 10/18/2019    Stented    History of echocardiogram 11/14/2014    Mild LVE. EF 40%. Mild, diffuse hypk. Mild LAE. Mild MR.       Hypercholesteremia 01/08/2014    Hypertension 2000    Noncompliance with medications 02/16/2016    Obesity     Orthostatic hypotension 05/17/2016    Osteoarthritis of both knees     Pain management 04/01/2016    Dr. Ordoñez Cassette     Popliteal cyst, bilateral      Sleep apnea     not using cpap    Stage 3b chronic kidney disease ([facility] 08/08/2022    Vitamin D deficiency 10/16/2014     Past Surgical History:   Procedure Laterality Date    HX HEART CATHETERIZATION  2019    Coronary stent    HX TUBAL LIGATION      IR INSERT TUNL CVC W/O PORT OVER 5 YR  09/21/2022     Barriers to Learning/Limitations: yes;  altered mental status (i.e.Sedation, Confusion)  Compensate with: visual, verbal, tactile, kinesthetic cues/model    Home Situation:   Home Situation  Home Environment: Private residence  # Steps to Enter: 3  Rails to Enter: Yes  Hand Rails : Bilateral  One/Two Story Residence: One story  Living Alone: No  Support Systems: Spouse/Significant Other  Patient Expects to be Discharged to[de-identified] Home with family assistance  Current DME Used/Available at Home: Cane, straight  Tub or Shower Type: Tub/Shower combination  [x]  Right hand dominant   []  Left hand dominant    Cognitive/Behavioral Status:  Neurologic State: Alert  Orientation Level: Oriented X4  Cognition: Follows commands  Safety/Judgement: Fall prevention    Skin: intact  Edema: None noted    Vision/Perceptual:           Acuity: Within Defined Limits         Coordination: BUE     Fine Motor Skills-Upper: Left Intact; Right Intact    Gross Motor Skills-Upper: Left Intact; Right Intact    Balance:  Sitting: Intact  Standing: Impaired; With support  Standing - Static: Good  Standing - Dynamic : Fair    Strength: BUE    Strength: Generally decreased, functional                Tone & Sensation: BUE       Sensation: Intact                      Range of Motion: BUE    AROM: Within functional limits                         Functional Mobility and Transfers for ADLs:  Bed Mobility:     Supine to Sit:  (pt sitting EOB upon OT arrival to room)     Scooting: Stand-by assistance  Transfers:  Sit to Stand: Contact guard assistance  Stand to Sit: Contact guard assistance                Toilet Transfer : Contact guard assistance (BSC)                ADL Assessment:   Feeding: Independent    Oral Facial Hygiene/Grooming: Stand-by assistance;Contact guard assistance (CGA standing)    Bathing: Contact guard assistance;Minimum assistance    Upper Body Dressing: Stand-by assistance    Lower Body Dressing: Contact guard assistance;Minimum assistance    Toileting: Contact guard assistance                ADL Intervention:  Feeding  Feeding Assistance: Independent  Drink to Mouth:  Independent    Grooming  Grooming Assistance: Stand-by assistance  Position Performed:  (seated on BSC)  Washing Hands: Stand-by assistance (with wet wipes)              Upper Body Dressing Assistance  Dressing Assistance: Stand-by assistance  Hospital Gown: Stand-by assistance         Toileting  Toileting Assistance: Contact guard assistance  Bladder Hygiene: Contact guard assistance    Cognitive Retraining  Safety/Judgement: Fall prevention      Pain:  Pain level pre-treatment: 10/10, stomach  Pain level post-treatment: 10/10   Pain Intervention(s): Medication (see MAR); Rest, Ice, Repositioning   Response to intervention: Nurse notified, See doc flow    Activity Tolerance:   Fair      Please refer to the flowsheet for vital signs taken during this treatment. After treatment:   [x] Patient left in no apparent distress sitting up in chair  [] Patient left in no apparent distress in bed  [x] Call bell left within reach  [x] Nursing notified  [] Caregiver present  [x] Chair alarm activated    COMMUNICATION/EDUCATION:   [x] Role of Occupational Therapy in the acute care setting  [x] Home safety education was provided and the patient/caregiver indicated understanding. [x] Patient/family have participated as able in goal setting and plan of care. [x] Patient/family agree to work toward stated goals and plan of care. [] Patient understands intent and goals of therapy, but is neutral about his/her participation. [] Patient is unable to participate in goal setting and plan of care. Thank you for this referral.  Hortencia Nuñez OTR/L  Time Calculation: 23 mins    Eval Complexity: History: MEDIUM Complexity : Expanded review of history including physical, cognitive and psychosocial  history ; Examination: MEDIUM Complexity : 3-5 performance deficits relating to physical, cognitive , or psychosocial skils that result in activity limitations and / or participation restrictions;    Decision Making:LOW Complexity : No comorbidities that affect functional and no verbal or physical assistance needed to complete eval tasks     [facility] [employer] AM-PAC® Daily Activity Inpatient Short Form (6-Clicks)*    How much HELP from another person does the patient currently need    (If the patient hasn't done an activity recently, how much help from another person do you think he/she would need if he/she tried?)   Total (Total A or Dep)   A Lot  (Mod to Max A)   A Little (Sup or Min A)   None (Mod I to I) Putting on and taking off regular lower body clothing? [] 1 [] 2 [x] 3 [] 4   2. Bathing (including washing, rinsing,      drying)? [] 1 [] 2 [x] 3 [] 4   3. Toileting, which includes using toilet, bedpan or urinal?   [] 1 [] 2 [x] 3 [] 4   4. Putting on and taking off regular upper body clothing? [] 1 [] 2 [x] 3 [] 4   5. Taking care of personal grooming such as brushing teeth? [] 1 [] 2 [x] 3 [] 4   6. Eating meals?    [] 1 [] 2 [] 3 [x] 4

## 2023-01-24 LAB
ANION GAP SERPL CALC-SCNC: 6 MMOL/L (ref 3–18)
BASOPHILS # BLD: 0 K/UL (ref 0–0.1)
BASOPHILS NFR BLD: 0 % (ref 0–2)
BUN SERPL-MCNC: 53 MG/DL (ref 7–18)
BUN/CREAT SERPL: 22 (ref 12–20)
CALCIUM SERPL-MCNC: 9.3 MG/DL (ref 8.5–10.1)
CHLORIDE SERPL-SCNC: 101 MMOL/L (ref 100–111)
CO2 SERPL-SCNC: 26 MMOL/L (ref 21–32)
CREAT SERPL-MCNC: 2.41 MG/DL (ref 0.6–1.3)
DIFFERENTIAL METHOD BLD: ABNORMAL
EOSINOPHIL # BLD: 0 K/UL (ref 0–0.4)
EOSINOPHIL NFR BLD: 0 % (ref 0–5)
ERYTHROCYTE [DISTWIDTH] IN BLOOD BY AUTOMATED COUNT: 17.5 % (ref 11.6–14.5)
GLUCOSE BLD STRIP.AUTO-MCNC: 107 MG/DL (ref 70–110)
GLUCOSE BLD STRIP.AUTO-MCNC: 111 MG/DL (ref 70–110)
GLUCOSE BLD STRIP.AUTO-MCNC: 168 MG/DL (ref 70–110)
GLUCOSE BLD STRIP.AUTO-MCNC: 277 MG/DL (ref 70–110)
GLUCOSE SERPL-MCNC: 114 MG/DL (ref 74–99)
HCT VFR BLD AUTO: 30.8 % (ref 35–45)
HGB BLD-MCNC: 9.5 G/DL (ref 12–16)
IMM GRANULOCYTES # BLD AUTO: 0 K/UL (ref 0–0.04)
IMM GRANULOCYTES NFR BLD AUTO: 0 % (ref 0–0.5)
LYMPHOCYTES # BLD: 2.8 K/UL (ref 0.9–3.6)
LYMPHOCYTES NFR BLD: 25 % (ref 21–52)
MAGNESIUM SERPL-MCNC: 2 MG/DL (ref 1.6–2.6)
MCH RBC QN AUTO: 28.2 PG (ref 24–34)
MCHC RBC AUTO-ENTMCNC: 30.8 G/DL (ref 31–37)
MCV RBC AUTO: 91.4 FL (ref 78–100)
METAMYELOCYTES NFR BLD MANUAL: 1 %
MONOCYTES # BLD: 0.7 K/UL (ref 0.05–1.2)
MONOCYTES NFR BLD: 6 % (ref 3–10)
MYELOCYTES NFR BLD MANUAL: 1 %
NEUTS BAND NFR BLD MANUAL: 2 %
NEUTS SEG # BLD: 7.6 K/UL (ref 1.8–8)
NEUTS SEG NFR BLD: 65 % (ref 40–73)
NRBC # BLD: 0.11 K/UL (ref 0–0.01)
NRBC BLD-RTO: 1 PER 100 WBC
PLATELET # BLD AUTO: 423 K/UL (ref 135–420)
PLATELET COMMENTS,PCOM: ABNORMAL
PMV BLD AUTO: 10 FL (ref 9.2–11.8)
POTASSIUM SERPL-SCNC: 4.1 MMOL/L (ref 3.5–5.5)
RBC # BLD AUTO: 3.37 M/UL (ref 4.2–5.3)
RBC MORPH BLD: ABNORMAL
SODIUM SERPL-SCNC: 133 MMOL/L (ref 136–145)
WBC # BLD AUTO: 11.3 K/UL (ref 4.6–13.2)

## 2023-01-24 PROCEDURE — 90935 HEMODIALYSIS ONE EVALUATION: CPT

## 2023-01-24 PROCEDURE — 74011250637 HC RX REV CODE- 250/637: Performed by: HOSPITALIST

## 2023-01-24 PROCEDURE — 82962 GLUCOSE BLOOD TEST: CPT

## 2023-01-24 PROCEDURE — 74011000258 HC RX REV CODE- 258: Performed by: SURGERY

## 2023-01-24 PROCEDURE — 80048 BASIC METABOLIC PNL TOTAL CA: CPT

## 2023-01-24 PROCEDURE — 85025 COMPLETE CBC W/AUTO DIFF WBC: CPT

## 2023-01-24 PROCEDURE — 74011250636 HC RX REV CODE- 250/636: Performed by: INTERNAL MEDICINE

## 2023-01-24 PROCEDURE — 74011636637 HC RX REV CODE- 636/637: Performed by: INTERNAL MEDICINE

## 2023-01-24 PROCEDURE — 36415 COLL VENOUS BLD VENIPUNCTURE: CPT

## 2023-01-24 PROCEDURE — 74011250637 HC RX REV CODE- 250/637: Performed by: SURGERY

## 2023-01-24 PROCEDURE — 94761 N-INVAS EAR/PLS OXIMETRY MLT: CPT

## 2023-01-24 PROCEDURE — 65270000029 HC RM PRIVATE

## 2023-01-24 PROCEDURE — C9113 INJ PANTOPRAZOLE SODIUM, VIA: HCPCS | Performed by: EMERGENCY MEDICINE

## 2023-01-24 PROCEDURE — 74011250636 HC RX REV CODE- 250/636: Performed by: PHYSICIAN ASSISTANT

## 2023-01-24 PROCEDURE — 74011250636 HC RX REV CODE- 250/636: Performed by: SURGERY

## 2023-01-24 PROCEDURE — 74011000250 HC RX REV CODE- 250: Performed by: SURGERY

## 2023-01-24 PROCEDURE — 74011250637 HC RX REV CODE- 250/637: Performed by: PHYSICIAN ASSISTANT

## 2023-01-24 PROCEDURE — 94640 AIRWAY INHALATION TREATMENT: CPT

## 2023-01-24 PROCEDURE — 83735 ASSAY OF MAGNESIUM: CPT

## 2023-01-24 PROCEDURE — 99232 SBSQ HOSP IP/OBS MODERATE 35: CPT | Performed by: INTERNAL MEDICINE

## 2023-01-24 PROCEDURE — 74011000250 HC RX REV CODE- 250: Performed by: EMERGENCY MEDICINE

## 2023-01-24 PROCEDURE — 99232 SBSQ HOSP IP/OBS MODERATE 35: CPT | Performed by: EMERGENCY MEDICINE

## 2023-01-24 PROCEDURE — 74011250636 HC RX REV CODE- 250/636: Performed by: EMERGENCY MEDICINE

## 2023-01-24 PROCEDURE — 74011636637 HC RX REV CODE- 636/637: Performed by: SURGERY

## 2023-01-24 RX ADMIN — SODIUM CHLORIDE, PRESERVATIVE FREE 10 ML: 5 INJECTION INTRAVENOUS at 06:28

## 2023-01-24 RX ADMIN — HEPARIN SODIUM 5000 UNITS: 5000 INJECTION INTRAVENOUS; SUBCUTANEOUS at 02:34

## 2023-01-24 RX ADMIN — PIPERACILLIN AND TAZOBACTAM 3.38 G: 3; .375 INJECTION, POWDER, FOR SOLUTION INTRAVENOUS at 20:30

## 2023-01-24 RX ADMIN — GUAIFENESIN 600 MG: 600 TABLET, EXTENDED RELEASE ORAL at 09:05

## 2023-01-24 RX ADMIN — HYDROMORPHONE HYDROCHLORIDE 1 MG: 1 INJECTION, SOLUTION INTRAMUSCULAR; INTRAVENOUS; SUBCUTANEOUS at 16:16

## 2023-01-24 RX ADMIN — OXYCODONE HYDROCHLORIDE AND ACETAMINOPHEN 1 TABLET: 5; 325 TABLET ORAL at 11:35

## 2023-01-24 RX ADMIN — DOCUSATE SODIUM 100 MG: 100 CAPSULE, LIQUID FILLED ORAL at 18:09

## 2023-01-24 RX ADMIN — SODIUM CHLORIDE, PRESERVATIVE FREE 10 ML: 5 INJECTION INTRAVENOUS at 22:57

## 2023-01-24 RX ADMIN — Medication 3 UNITS: at 11:30

## 2023-01-24 RX ADMIN — DOCUSATE SODIUM 100 MG: 100 CAPSULE, LIQUID FILLED ORAL at 09:06

## 2023-01-24 RX ADMIN — HYDROMORPHONE HYDROCHLORIDE 1 MG: 1 INJECTION, SOLUTION INTRAMUSCULAR; INTRAVENOUS; SUBCUTANEOUS at 12:54

## 2023-01-24 RX ADMIN — HYDROMORPHONE HYDROCHLORIDE 1 MG: 1 INJECTION, SOLUTION INTRAMUSCULAR; INTRAVENOUS; SUBCUTANEOUS at 20:30

## 2023-01-24 RX ADMIN — PIPERACILLIN AND TAZOBACTAM 3.38 G: 3; .375 INJECTION, POWDER, FOR SOLUTION INTRAVENOUS at 09:05

## 2023-01-24 RX ADMIN — HYDROMORPHONE HYDROCHLORIDE 1 MG: 1 INJECTION, SOLUTION INTRAMUSCULAR; INTRAVENOUS; SUBCUTANEOUS at 03:20

## 2023-01-24 RX ADMIN — EZETIMIBE 10 MG: 10 TABLET ORAL at 09:05

## 2023-01-24 RX ADMIN — METOPROLOL TARTRATE 25 MG: 25 TABLET, FILM COATED ORAL at 18:09

## 2023-01-24 RX ADMIN — HYDROMORPHONE HYDROCHLORIDE 1 MG: 1 INJECTION, SOLUTION INTRAMUSCULAR; INTRAVENOUS; SUBCUTANEOUS at 00:22

## 2023-01-24 RX ADMIN — HYDROMORPHONE HYDROCHLORIDE 1 MG: 1 INJECTION, SOLUTION INTRAMUSCULAR; INTRAVENOUS; SUBCUTANEOUS at 06:20

## 2023-01-24 RX ADMIN — PANTOPRAZOLE SODIUM 40 MG: 40 INJECTION, POWDER, FOR SOLUTION INTRAVENOUS at 09:06

## 2023-01-24 RX ADMIN — EPOETIN ALFA-EPBX 4000 UNITS: 4000 INJECTION, SOLUTION INTRAVENOUS; SUBCUTANEOUS at 22:57

## 2023-01-24 RX ADMIN — GUAIFENESIN 600 MG: 600 TABLET, EXTENDED RELEASE ORAL at 22:57

## 2023-01-24 RX ADMIN — Medication 9 UNITS: at 18:09

## 2023-01-24 RX ADMIN — ASPIRIN 81 MG CHEWABLE TABLET 162 MG: 81 TABLET CHEWABLE at 09:05

## 2023-01-24 RX ADMIN — ATORVASTATIN CALCIUM 80 MG: 40 TABLET, FILM COATED ORAL at 22:57

## 2023-01-24 RX ADMIN — PREDNISONE 20 MG: 20 TABLET ORAL at 09:05

## 2023-01-24 RX ADMIN — METOPROLOL TARTRATE 25 MG: 25 TABLET, FILM COATED ORAL at 09:06

## 2023-01-24 RX ADMIN — TIOTROPIUM BROMIDE AND OLODATEROL 2 PUFF: 3.124; 2.736 SPRAY, METERED RESPIRATORY (INHALATION) at 09:27

## 2023-01-24 RX ADMIN — PANTOPRAZOLE SODIUM 40 MG: 40 INJECTION, POWDER, FOR SOLUTION INTRAVENOUS at 22:57

## 2023-01-24 NOTE — PROGRESS NOTES
Bedside shift change report given to Luh Marsh RN (oncoming nurse) by FLORENCIA Whitfield (offgoing nurse). Report included the following information SBAR, Kardex, Intake/Output, MAR, and Recent Results.       Wound Prevention Checklist    Patient: Sunday Faisal (89 y.o. female)  Date: 1/23/2023  Diagnosis: COPD exacerbation ([facility] [J44.1] COPD with acute exacerbation ([facility]    Precautions: Fall       []  Heel prevention boots placed on patient    []  Patient turned q2h during shift    []  Lift team ordered    []  Patient on [employer] bed/Specialty bed    []  Each Wound is documented during shift (Stage, Color, drainage, odor, measurements, and dressings)    [x]  Dual skin check done with Andrea Reyes

## 2023-01-24 NOTE — PROGRESS NOTES
This nurse entered room to give patient insulin. Patient started complaining she was in pain and no one was helping her. This nurse explained she had Percocet one hour ago and she stated \"well its not working\". this nurse explained that a recheck on blood pressure was needed. Patient continued to complain that I was not the night shift nurse and was not taping the IV correctly. IV is taped to patient arm, but patient is laying on IV causing the IV to go off. Patient continued to complain that dialysis has not gotten her. Patient refused earlier and this nurse explained they will get her when a bed becomes available. Patient did not like that answer and was not happy. This nurse to come back into room to get patient pain medication.

## 2023-01-24 NOTE — PROGRESS NOTES
Comprehensive Nutrition Assessment    Type and Reason for Visit: Reassess, Positive nutrition screen    Nutrition Recommendations/Plan:   Continue current diet as tolerated. Advance as medically appropriate. Add Nepro with Carb Steady (each provides 425 kcal, 19g protein) once daily as trial.   Monitor PO intake/tolerance, readiness for dietary advancement, compliance with oral supplement, weight, and POC while admitted. Malnutrition Assessment:  Malnutrition Status: At risk for malnutrition (specify) (r/t recent surgery; dialysis pt; full liquid diet.) (01/24/23 1321)      Nutrition Assessment:    Pt with left lower quadrant pain 1/17- d/c postponed. Pneumoperitoneum identified noted on CT with evidence of diverticulitis. S/P exploratory laparotomy with colectomy and colostomy creation 1/18. Diet advanced from NPO to clears 1/20, and to full liquids 1/23- 1 entry for % intake since advancement. Started on Gentamicin therapy for intra-abdominal infection. Plan for dialysis today, pt refused this morning, plan to re attempt this afternoon per MD request. Last dialyzed 1/21- 2.5 L removed. Visited pt- alert, sitting up and eating lunch. Endorsed good intake of full liquid diet, though expressed frustration and stated that she was \"sick of liquids. \" Explained that dietary advancement is up to surgery, but encouraged intake as tolerated. Pt endorsed liking Nepro shakes prior to diet change/procedure. Will trial 1x daily. Nutrition Related Findings:    Last BM 1/23- colostomy. Output: 200 mL (urine). Pertinent labs: Na (133) L, BUN (53) H, Cr (2.41) H, GFR (21) L. Pertinent meds: colace, Retacrit, Humalog, protonix.  Wound Type: Surgical incision (colostomy)    Current Nutrition Intake & Therapies:  Average Meal Intake: %  Average Supplement Intake: None ordered  ADULT DIET Full Liquid    Anthropometric Measures:  Height: 5' 4\" (162.6 cm)  Ideal Body Weight (IBW): 120 lbs (55 kg)  Admission Body Weight: 212 lb 15.4 oz  Current Body Wt:  96.6 kg (212 lb 15.4 oz), 177.5 % IBW. Stated  Current BMI (kg/m2): 36.5  Usual Body Weight: 97.5 kg (215 lb)  % Weight Change (Calculated): -0.9  Weight Adjustment: No adjustment  BMI Category: Obese class 2 (BMI 35.0-39. 9)    Estimated Daily Nutrient Needs:  Energy Requirements Based On: Kcal/kg (wt x25-35)  Weight Used for Energy Requirements: Other (specify) (SBW)  Energy (kcal/day): 7690-7681  Weight Used for Protein Requirements: Current  Protein (g/day):  (wt x1-1.2)  Method Used for Fluid Requirements: Standard renal  Fluid (ml/day): 750-1500    Nutrition Diagnosis:   Increased nutrient needs related to catabolic illness, increased demand for energy/nutrients, renal dysfunction as evidenced by dialysis (recent surgery and colostomy creation.)    Nutrition Interventions:   Food and/or Nutrient Delivery: Continue current diet, Start oral nutrition supplement  Nutrition Education/Counseling: No recommendations at this time, Education not indicated  Coordination of Nutrition Care: Continue to monitor while inpatient  Plan of Care discussed with: Pt    Goals:  Previous Goal Met: Progressing toward goal(s)  Goals: Meet at least 75% of estimated needs, by next RD assessment       Nutrition Monitoring and Evaluation:   Behavioral-Environmental Outcomes: None identified  Food/Nutrient Intake Outcomes: Food and nutrient intake, Supplement intake, Diet advancement/tolerance  Physical Signs/Symptoms Outcomes: Biochemical data, GI status, Meal time behavior, Weight    Discharge Planning:     Too soon to determine    Alyson Nathan, [address]  Contact: [phone]

## 2023-01-24 NOTE — DIALYSIS
ACUTE HEMODIALYSIS FLOW SHEET    HEMODIALYSIS ORDERS: Physician: Dr. Alfonso Bermudez: aclear   Duration: 2 hr   BFR: 300   DFR: 500   Dialysate:  Temp 36-37*C   K+  2    Ca+ 2.5   Na 138   Bicarb 35   Wt Readings from Last 1 Encounters:   01/24/23 96.4 kg (212 lb 8.4 oz)    Patient Chart [x]   Unable to Obtain []  Dry weight/UF Goal: 3000 ml    Heparin []  Bolus    Units    [] Hourly    Units    [x]None       Pre BP: 163/85  Pulse: 74  Respirations: 18 Temp: 97.2 temporal  [] Oral  [] Ax  [] Esoph   Labs: []  Pre  []  Post:   [x] N/A   Additional Orders (medications, blood products, hypotension management): [] Yes   [x] No     [x]  [facility] Consent Verified     CATHETER ACCESS:    [x]Right   []Left   []IJ   []Fem  [x]Chest wall  []TransHepatic   [] First use X-ray verified     [x]Tunnel    [] Non Tunneled   [x]No S/S infection  []Redness  []Drainage []Cultured []Swelling []Pain   [x]Medical Aseptic Prep Utilized   []Dressing Changed  [] Biopatch  Date:    []Clotted   [x]Patent   Flows: [x]Good  []Poor  []Reversed   If access problem,  notified: []Yes    [x]N/A        GRAFT/FISTULA ACCESS:   [x]N/A                        Hospital: [facility]          Room # 458/01    [x] Routine         [] 1st Time Acute/Chronic   [] Urgent      [] Stat            [x] Acute Room   []  Bedside    [] ICU/CCU     [] ER     Isolation Precautions:  [x] Dialysis    Contact     ALLERGIES:     No Known Allergies     Code Status:  Full Code     Hepatitis Status      Lab Results   Component Value Date/Time    Hepatitis A, IgM NEGATIVE 03/23/2015 09:04 AM    Hepatitis B surface Ag <0.10 12/09/2022 07:03 PM    Hepatitis B surface Ab >1,000.00 12/09/2022 07:03 PM    Hepatitis B core, IgM NEGATIVE 03/23/2015 09:04 AM    Hepatitis C virus Ab 0.09 03/23/2015 09:04 AM        Current Labs:      Lab Results   Component Value Date/Time    WBC 11.3 01/24/2023 02:46 AM    HGB 9.5 (L) 01/24/2023 02:46 AM    HCT 30.8 (L) 01/24/2023 02:46 AM PLATELET 501 (H) 91/04/0207 02:46 AM    MCV 91.4 01/24/2023 02:46 AM     Lab Results   Component Value Date/Time    Sodium 133 (L) 01/24/2023 02:46 AM    Potassium 4.1 01/24/2023 02:46 AM    Chloride 101 01/24/2023 02:46 AM    CO2 26 01/24/2023 02:46 AM    Anion gap 6 01/24/2023 02:46 AM    Glucose 114 (H) 01/24/2023 02:46 AM    BUN 53 (H) 01/24/2023 02:46 AM    Creatinine 2.41 (H) 01/24/2023 02:46 AM    BUN/Creatinine ratio 22 (H) 01/24/2023 02:46 AM    GFR est AA 54 (L) 09/22/2022 04:20 AM    GFR est non-AA 44 (L) 09/22/2022 04:20 AM    Calcium 9.3 01/24/2023 02:46 AM          DIET:  DIET ADULT ORAL NUTRITION SUPPLEMENT  DIET ADULT     PRIMARY NURSE REPORT:   Pre Dialysis: Melida Romero RN    Time: 1250      EDUCATION:    [x] Patient           Knowledge Basis: []None []Minimal [x] Substantial [] Unknown  Barriers to learning  [x]None  [] Intubated/Trached/Ventilated  [] Sedated/Paralyzed   [x] Access Care     [] S&S of infection  [] Fluid Management  [] K+   [x] Procedural    [] Medications   [] Tx Options   [] Transplant   [] Diet      Teaching Tools:  [x] Explain  [] Demo  [] Handouts [] Video  Patient response: [x] Verbalized understanding   [] Requires follow up        [x] Time Out/Safety Check    [x] Extracorporeal Circuit Tested for integrity       RO/HEMODIALYSIS MACHINE SAFETY CHECKS - Before each treatment:        [facility]                                    [x] Unit Machine # 9 with centralized RO                                  [] Portable Machine #1/RO serial # [id number]                                  [] Portable Machine #2/RO serial # [id number]                                  [] Portable Machine #4/RO serial # [id number]                                  [] Portable Machine #10/RO serial # [id number]                                                                                                       Alarm Test:  Pass time 2333            [x] RO/Machine Log Complete    Machine Temp    36-37*C Dialysate: pH  7.4    Conductivity: Meter 14.0    HD Machine  9     TCD: 14.1  Dialyzer Lot # [id number]     Blood Tubing Lot # [id number]     Saline Lot # [id number]     CHLORINE TESTING-Before each treatment and every 4 hours    Total Chlorine: [x] less than 0.1 ppm  Initial Time Check: 1140       4 Hr/2nd Check Time: 1540   (if greater than 0.1 ppm from Primary then every 30 minutes from Secondary)     TREATMENT INITIATION - with Dialysis Precautions:   [x] All Connections Secured              [x] Saline Line Double Clamped   [x] Venous Parameters Set               [x] Arterial Parameters Set    [x] Prime Given 250ml NSS              [x]Air Foam Detector Engaged        Treatment Initiation Note:  SEE HD RN NOTE    During Treatment Notes:  2073  Face & Vascular access visible with art and adan line connections intact. Pt tolerating dialysis. 1339  Face & Vascular access visible with art and adan line connections intact. Pt tolerating dialysis. 1345  Face & Vascular access visible with art and adan line connections intact. Pt tolerating dialysis. 1400  Face & Vascular access visible with art and adan line connections intact. Pt tolerating dialysis. 1415  Face & Vascular access visible with art and adan line connections intact. Pt tolerating dialysis. 1430  Face & Vascular access visible with art and adan line connections intact. Pt tolerating dialysis. 1445  Face & Vascular access visible with art and adan line connections intact. Pt tolerating dialysis. 1500  Face & Vascular access visible with art and adan line connections intact. Pt tolerating dialysis. 1515  Face & Vascular access visible with art and adan line connections intact. Pt tolerating dialysis. 1530  Face & Vascular access visible with art and adan line connections intact. Pt tolerating dialysis. 1545  Face & Vascular access visible with art and adan line connections intact. Pt tolerating dialysis.   1600  Face & Vascular access visible with art and adan line connections intact. Pt tolerating dialysis. 1615  Face & Vascular access visible with art and adan line connections intact. Pt tolerating dialysis. 1550  Dialysis treatment complete.            Post Assessment  Dialyzer Cleared:   [x] Good  [] Fair  [] Poor  Blood processed:  33.8 L  UF Removed:  3000 Ml    Post BP: 168/52  Pulse: 68  Respirations: 18  Temp: 97.0 temporal  [] Oral  [] Ax  [] Esophageal   Lungs: [] Clear                [x] No change from initial assessment   Post Tx Vascular Access: [x] N/A     Cardiac:  [x] Regular   [] Irregular   Rhythm:  [] Monitored   [x] Not Monitored    CVC Catheter:   Locking solution: Heparin 1:1000 U  Arterial port  1.9 ml   Venous port 1.9 ml   Edema:  [x] None  [] Generalized                     Skin:[] Warm  [x] Dry [] Diaphoretic               [] Flushed  [] Pale [] Cyanotic Pain:  [x]0  []1-2  []3-4  []5-6   []7-8  []9-10         Post Treatment Note:  SEE HD RN NOTE     POST TREATMENT PRIMARY NURSE HANDOFF REPORT:   Post Dialysis: Melida Romero RN        Time:  1939       Abbreviations: AVG-arterial venous graft, AVF-arterial venous fistula, IJ-Internal Jugular, Subcl-Subclavian, Fem-Femoral, Tx-treatment, AP/HR-apical heart rate, VSS- Vital Signs Stable, CVC- Central Venous Catheter, DFR-dialysate flow rate, BFR-blood flow rate, AP-arterial pressure, -venous pressure, UF-ultrafiltrate, TMP-transmembrane pressure, Adan-Venous, Art-Arterial, RO-Reverse Osmosis

## 2023-01-24 NOTE — PROGRESS NOTES
[facility]  Pulmonary, Critical Care, and Sleep Medicine    Name: Virgilio Perez MRN: [id number]   : 1950 Hospital: [address] [place] [address] Dr   Date: 2023        Pulmonary -Critical Care: progress note    Admission Date:   2023  LOS: 11  MAR reviewed and pertinent medications noted or modified as needed    IMPRESSION:   Acute hypoxic respiratory failure - Improving  Wheezing secondary to COPD, component of cardiac asthma with chronic systolic and diastolic heart failure. Wheezing  - COPD-previous imaging with small cystic changes noted on CT scan but PFTs with predominant restrictive lung disease. She does have a history of smoking and secondhand smoke exposure and may have component of  \"cardiac asthma\" in the setting of chronic systolic/diastolic heart failure, favor the latter Echo from  with EF 45% and mild to moderate mitral regurgitation. CXR per my review notes some vascular congestion at bases RIGHT > LEFT  Dysphonia: Persistent Upper airway sounds: Possible soft tissue and/or Vocal Cord Dysfunction  Abdominal pain-new symptoms with CT scan showing possible perforation- now s/p ex lap with sigmoid colectomy end colostomy 23  Peritonitis- fluid cultures polymicrobial, enteric organism- see below on Cx resutls  Hypertension: CLVH on Echo  CHF: HFpEF: Diastolic dysfunction noted on echo, history of CAD with STEMI in 2018  TESSA  Was previously followed by Dr. Anel Herman- Lost to follow up and has not been seen for an extended period of time. DM 2  ESRD- HD dependent  Former smoker 46 PY, quit in 2019, + 2nd hand smoke exposure from   Chronic pain syndrome  Morbid Obesity Body mass index is 41.81 kg/m²  H/O COVID Infection .   H/O Abnormal CT Chest Imagin2022- diffuse asymmetric GGOs and atelectasis- most likely from pulmonary edema- Resolved on CT Chest 22         RECOMMENDATIONS:     Continue Stiolto  Albuterol nebs prn  Stop steroids after today  No upper airway sounds over VC/neck region currently. Continue to monitor and reassess- Can consider OP ENT evaluation   Maintain aspiration precautions: HOB >30 degrees  SpO2 goal >92%  Hold on PAP therapy due to recent bowel surgery  Bronchial /oral hygiene; IS at bedside please  Monitor I&Os  Antiinfective therapy per primary and ID  HD per nephrology  Electrolyte management, prophylaxis and glycemic control per primary and respective consultants  Diet per Surgery team  OP Pulmonary- Sleep follow up  Will follow. CODE STATUS: Full Code        Subjective/History: This patient has been seen and evaluated at the request of Dr. Forbes Phalen for COPD exacerbation. HPI  Patient is a 81 y.o. female  PMHx of ESRD on HD, COPD not on home O2, CAD, DM, HTN, HFpEF. The patient has been seen by our group as an in-patient. She has not followed up with any Pulmonologist as an OP. The patient states she did not realized she needed to follow up with Pulmonary but that she will in the future. The patient reports increasing shortness of breath and wheezing over the past week- more significant after her HD session. She reports that she had to cut her run 15 minutes early so she could get her medial ride. When she got home she became more short of breath with wheeling. She also noted nausea without emesis. No known sick contacts. No fever or chills. No recurrent nausea. No emesis,  No chest pain  She continues to refrain from smoking, but her  does smoke with in and out side the home. Inhalers: Anoro and PRN albuterol nebs  Respiratory Triggers: smoke and cold air  Pets:  2 has 4: small dogs: chihuahua -poodle mixes- they do not sleep with patient and have their own room  Home: No known water leaks, no bug infestations  Obstructive Sleep Apnea (TESSA): The patient has history of TESSA. She was previously followed by Dr. Tyrone Schaffer. She reports that she had a recalled Jaime PAP device. Her DME was [facility] She reports that she did register her device with [employer], but never got a replacement. The patient stopped using her device and actually brought her device back to [facility]. She has not used PAP therapy for an extended period of time. She is HD dependant. She denies any diet indiscretions. The patient had ID earlier today in our HD unit. She has a THDC in her right upper chest wall. She had a AVF created a few weeks ago and is not ready for use    Interval Evaluation:   01/24/23  LOS: 11    Patient resting in bed. She is upset that the surgeon has not advanced her diet to solid foods. Surgeon saw patient yesterday and advanced her to full liquid diet. I have tried to explain to the patient that because or her surgery, only the surgeon can advance her diet and that the surgeon wants to make sure she is healing properly.    In regards to the patient's pulmonary/ respiratory status:  Sitting up breathing comfortably on RA  She states her breathing is stable, no complaints  No chest pain  No hemoptysis  Doing well with stiolto instead of scheduled nebs/brovana  Vital signs noted below- afebrile, persistent hypertension          Patient Vitals for the past 24 hrs:   Temp Pulse Resp BP SpO2   01/24/23 0828 98.1 °F (36.7 °C) 64 18 (!) 148/75 99 %   01/24/23 0320 97.6 °F (36.4 °C) 64 18 (!) 156/86 99 %   01/24/23 0020 97.8 °F (36.6 °C) 74 18 (!) 159/72 97 %   01/23/23 2057 98.4 °F (36.9 °C) 71 17 (!) 159/66 99 %   01/23/23 1519 98.5 °F (36.9 °C) 66 20 (!) 146/75 97 %   01/23/23 1137 97.8 °F (36.6 °C) 64 18 (!) 154/71 97 %     Inpat Anti-Infectives (From admission, onward)       Start     Ordered Stop    01/18/23 2000  piperacillin-tazobactam (ZOSYN) 3.375 g in 0.9% sodium chloride (MBP/ADV) 100 mL MBP  3.375 g,   IntraVENous,   EVERY 12 HOURS         01/18/23 1248 --                      Past Medical History:   Diagnosis Date    Abnormal WBC count 05/17/2016    Anemia     Bilateral shoulder pain 09/27/2016 Chondromalacia of both patellae     Chronic heart failure with preserved ejection fraction ([facility] 1/13/2023    COPD (chronic obstructive pulmonary disease) ([facility] 09/2015    mild-mod dz; Dr Bhargavi Hughes    Diabetes [facility]) 2013    Diabetic eye exam ([facility] 2016    Dilated cardiomyopathy ([facility]     Dyslipidemia     Gout     Heart attack ([facility] 10/18/2019    Stented    History of echocardiogram 11/14/2014    Mild LVE. EF 40%. Mild, diffuse hypk. Mild LAE. Mild MR.       Hypercholesteremia 01/08/2014    Hypertension 2000    Noncompliance with medications 02/16/2016    Obesity     Orthostatic hypotension 05/17/2016    Osteoarthritis of both knees     Pain management 04/01/2016    Dr. Maritza Shepherd     Popliteal cyst, bilateral      Sleep apnea     not using cpap    Stage 3b chronic kidney disease ([facility] 08/08/2022    Vitamin D deficiency 10/16/2014      Past Surgical History:   Procedure Laterality Date    HX HEART CATHETERIZATION  2019    Coronary stent    HX TUBAL LIGATION      IR INSERT TUNL CVC W/O PORT OVER 5 YR  09/21/2022        Current Facility-Administered Medications   Medication Dose Route Frequency    GENTAMICIN INFORMATION NOTE   Other Rx Dosing/Monitoring    docusate sodium (COLACE) capsule 100 mg  100 mg Oral BID    heparin (porcine) 1,000 unit/mL injection 3,800 Units  3,800 Units Hemodialysis DIALYSIS TUE, THU & SAT    tiotropium-olodateroL (STIOLTO RESPIMAT) 2.5-2.5 mcg/actuation inhaler 2 Puff  2 Puff Inhalation DAILY    epoetin tonny-epbx (RETACRIT) injection 4,000 Units  4,000 Units SubCUTAneous Q TUE, THU & SAT    pantoprazole (PROTONIX) 40 mg in 0.9% sodium chloride 10 mL injection  40 mg IntraVENous Q12H    piperacillin-tazobactam (ZOSYN) 3.375 g in 0.9% sodium chloride (MBP/ADV) 100 mL MBP  3.375 g IntraVENous Q12H    [Held by provider] melatonin tablet 5 mg  5 mg Oral QHS    heparin (porcine) injection 5,000 Units  5,000 Units SubCUTAneous Q8H    aspirin chewable tablet 162 mg  162 mg Oral DAILY atorvastatin (LIPITOR) tablet 80 mg  80 mg Oral QHS    ezetimibe (ZETIA) tablet 10 mg  10 mg Oral DAILY    [Held by provider] gabapentin (NEURONTIN) capsule 400 mg  400 mg Oral DAILY    sodium chloride (NS) flush 5-40 mL  5-40 mL IntraVENous Q8H    metoprolol tartrate (LOPRESSOR) tablet 25 mg  25 mg Oral BID    insulin lispro (HUMALOG) injection   SubCUTAneous AC&HS    [Held by provider] insulin glargine (LANTUS) injection 5 Units  5 Units SubCUTAneous QHS    guaiFENesin ER (MUCINEX) tablet 600 mg  600 mg Oral Q12H     No Known Allergies     Review of Systems:  A comprehensive review of systems was negative except for that written in the HPI. Objective:   Vital Signs:    Visit Vitals  BP (!) 148/75 (BP 1 Location: Left upper arm, BP Patient Position: Sitting)   Pulse 64   Temp 98.1 °F (36.7 °C)   Resp 18   Ht 5' 4\" (1.626 m)   Wt 96.4 kg (212 lb 8.4 oz)   SpO2 99%   Breastfeeding Unknown   BMI 36.48 kg/m²       O2 Device: None (Room air)   O2 Flow Rate (L/min): 2 l/min   Temp (24hrs), Av °F (36.7 °C), Min:97.6 °F (36.4 °C), Max:98.5 °F (36.9 °C)       Intake/Output:   Last shift:      No intake/output data recorded. Last 3 shifts:  1901 -  0700  In: 2100 [P.O.:1800; I.V.:300]  Out: 2855 [Urine:2850]    Intake/Output Summary (Last 24 hours) at 2023 1049  Last data filed at 2023 7913  Gross per 24 hour   Intake 1140 ml   Output 1455 ml   Net -315 ml         Physical Exam:    General:  Alert, cooperative, no distress, appears stated age + Obese body habitus   Head:  Normocephalic, without obvious abnormality, atraumatic. Eyes:  Conjunctivae/corneas clear. PERRL, EOMs intact. Nose: Nares normal. Septum midline. Mucosa normal. No drainage or sinus tenderness. Throat: Lips, mucosa, and tongue normal. No teeth   Neck: Supple, symmetrical, trachea midline, no adenopathy, thyroid: no enlargment/tenderness/nodules, no carotid bruit and no JVD.    Back:   Symmetric,   Lungs:   Bilateral breath sounds present. Clear to auscultation   Chest wall:  THDC right upper chest wall. No tenderness or deformity.- No crepitus   Heart:  Regular rate and rhythm, S1, S2 normal, no murmur, click, rub or gallop. Abdomen:   Soft,  Obese, surgical staples and colostomy in place   Extremities: Extremities normal, atraumatic, no cyanosis or edema. - unmatured AFV right arm- + palpable thrill   Pulses: 2+ and symmetric all extremities. Skin: Skin color, texture, turgor normal. No rashes or lesions   Lymph nodes:      Cervical, supraclavicular nodes: normal on palpation   Neurologic: Grossly nonfocal       Data:     Recent Labs     01/24/23 0246 01/23/23 0247   WBC 11.3 10.4   HGB 9.5* 8.4*   HCT 30.8* 26.8*   * 341        Latest Reference Range & Units 12/9/22 12:48 12/10/22 04:27 12/11/22 02:46 12/12/22 04:35 12/13/22 01:46 1/13/23 09:55   EOSINOPHILS 0 - 5 % 3 3 2 2 2 1   ABS. EOSINOPHILS 0.0 - 0.4 K/UL 0.1 0.1 0.1 0.1 0.1 0.0         Recent Labs     01/24/23 0246 01/23/23 0247 01/22/23  0354   * 132* 134*   K 4.1 3.9 4.0    99* 100   CO2 26 26 29   * 122* 157*   BUN 53* 53* 46*   CREA 2.41* 2.50* 2.64*   CA 9.3 8.8 8.8   MG 2.0 2.1  --    PHOS  --  3.6 4.2   ALB  --   --  2.8*       Lab Results   Component Value Date/Time    NT pro-BNP 1,057 (H) 10/08/2022 05:15 PM    NT pro-BNP 2,013 (H) 09/09/2022 07:34 AM    NT pro-BNP 1,871 (H) 08/08/2022 01:48 AM    NT pro-BNP 3,952 (H) 07/08/2022 09:53 AM    NT pro-BNP 1,020 (H) 03/18/2021 01:29 AM     No results for input(s): PH, PCO2, PO2, HCO3, FIO2 in the last 72 hours. No results for input(s): FIO2I, IFO2, HCO3I, IHCO3, HCOPOC, PCO2I, PCOPOC, IPHI, PHI, PHPOC, PO2I, PO2POC in the last 72 hours.     No lab exists for component: IPOC2      MICRO:  Results       Procedure Component Value Units Date/Time    CULTURE, BODY FLUID Raford Payer STAIN [[id number]]  (Abnormal)  (Susceptibility) Collected: 01/19/23 9017    Order Status: Completed Specimen: Abdominal Fluid Updated: 01/23/23 0758     Special Requests: NO SPECIAL REQUESTS        GRAM STAIN FEW WBCS SEEN         4+ GRAM POSITIVE RODS         4+ Gram negative rods               2+ Gram positive cocci IN PAIRS           Culture result:       MODERATE Escherichia coli ** (EXTENDED SPECTRUM BETA LACTAMASE ) **                  HEAVY Enterococcus faecalis                  HEAVY BACTEROIDES SPECIES BETA LACTAMASE POSITIVE          Susceptibility        Escherichia coli      STEPHANIE      Amikacin ($) Susceptible      Ampicillin ($) Resistant      Ampicillin/sulbactam ($) Susceptible      Cefazolin ($) Resistant      Cefepime ($$) Resistant      Cefoxitin Intermediate      Ceftazidime ($) Resistant      Ceftriaxone ($) Resistant      Ciprofloxacin ($) Resistant      Gentamicin ($) Susceptible      Levofloxacin ($) Resistant      Meropenem ($$) Susceptible      Piperacillin/Tazobac ($) Susceptible      Tobramycin ($) Susceptible      Trimeth/Sulfa Resistant                       Susceptibility        Enterococcus faecalis      STEPHANIE      Ampicillin ($) Susceptible      Daptomycin ($$$$$) Susceptible      Linezolid ($$$$$) Susceptible      Vancomycin ($) Susceptible                           RESPIRATORY VIRUS PANEL W/COVID-19, PCR [[id number]] Collected: 01/13/23 8897    Order Status: Completed Specimen: Nasopharyngeal Updated: 01/13/23 1858     Adenovirus Not detected        Coronavirus 229E Not detected        Coronavirus HKU1 Not detected        Coronavirus CVNL63 Not detected        Coronavirus OC43 Not detected        SARS-CoV-2, PCR Not detected        Metapneumovirus Not detected        Rhinovirus and Enterovirus Not detected        Influenza A Not detected        Influenza A, subtype H1 Not detected        Influenza A, subtype H3 Not detected        INFLUENZA A H1N1 PCR Not detected        Influenza B Not detected        Parainfluenza 1 Not detected        Parainfluenza 2 Not detected        Parainfluenza 3 Not detected        Parainfluenza virus 4 Not detected        RSV by PCR Not detected        B. parapertussis, PCR Not detected        Bordetella pertussis - PCR Not detected        Chlamydophila pneumoniae DNA, QL, PCR Not detected        Mycoplasma pneumoniae DNA, QL, PCR Not detected                 CARDIO:    EKG Results       Procedure 720 Value Units Date/Time    EKG, 12 LEAD, INITIAL [[id number]] Collected: 01/13/23 0942    Order Status: Completed Updated: 01/13/23 1856     Ventricular Rate 98 BPM      Atrial Rate 98 BPM      P-R Interval 160 ms      QRS Duration 78 ms      Q-T Interval 360 ms      QTC Calculation (Bezet) 459 ms      Calculated P Axis 74 degrees      Calculated R Axis 20 degrees      Calculated T Axis 86 degrees      Diagnosis --     Normal sinus rhythm  Left atrial enlargement  Minimal voltage criteria for LVH, may be normal variant ( Dave product )  Borderline ECG  When compared with ECG of 08-DEC-2022 12:19,  No significant change was found  Confirmed by Juan Carlos Giraldo ([id number]) on 1/13/2023 6:56:19 PM              06/25/22    ECHO ADULT COMPLETE 06/29/2022 6/29/2022    Interpretation Summary    Left Ventricle: Mildly reduced left ventricular systolic function with a visually estimated EF of 50 - 55%. Left ventricle is mildly dilated. Increased wall thickness. Findings consistent with mild concentric hypertrophy. See diagram for wall motion findings. Diastolic dysfunction present with normal LV EF. Mitral Valve: Mildly thickened leaflet. Mild regurgitation with a centrally directed jet. Signed by: Shan Martinez MD on 6/29/2022 12:12 PM    Imaging:  I have personally reviewed the patients radiographs and have reviewed the reports:                  Chest CT:  9/20/22  IMPRESSION     1. Interval resolution of extensive bilateral lung airspace disease. 2.  Interval resolved small bilateral pleural effusions and mild cardiomegaly. 3.  Interval resolved lymphadenopathy.   4. Chronic interstitial reticular and cystic changes of the lungs appear  stable. Thank you for your referral.            Complex decision making was made in the evaluation and management plans during this consultation. More than 50% of time was spent in counseling and coordination of care including review of data and discussion with other team members. Montrell Flowers.  [facility]  Pulmonary, Critical Care, and Sleep Medicine

## 2023-01-24 NOTE — PROGRESS NOTES
[facility] Pharmacokinetic Monitoring Service - Aminoglycoside    Franc Miller is a 81 y.o. female starting on gentamicin therapy for Intra-abdominal Infection. Pharmacy consulted by Brian Varghese MD for monitoring and adjustment. Target Concentration: Pre-Dialysis Level: </= 2 mcg/mL    Additional Antimicrobials: Piperacillin/Tazobactam    Pertinent Laboratory Values:   Temp: 98.4 °F (36.9 °C)  Weight: 96 kg (211 lb 10.3 oz)  Recent Labs     01/23/23  0247 01/22/23  0354 01/21/23  0145   CREA 2.50* 2.64* 3.73*   BUN 53* 46* 70*   WBC 10.4  --  9.7     Estimated Creatinine Clearance: 23.5 mL/min (A) (based on SCr of 2.5 mg/dL (H)).     Pertinent Cultures:  Culture Date Source Results   01/19 Abdominal Fluid MODERATE Escherichia coli ** (EXTENDED SPECTRUM BETA LACTAMASE )  HEAVY Enterococcus faecalis  HEAVY BACTEROIDES SPECIES BETA LACTAMASE POSITIVE          Plan:  Concentration-guided dosing due to renal impairment and intermittent hemodialysis  Start gentamicin 140 mg IV x 1 (2 mg/kg based on adjusted weight of 71.2 kg)  Will redose with 1 mg/kg when pre-dialysis concentration is less than or equal to 2 mcg/ml  Renal labs as indicated   gentamicin concentration ordered for  01/26, prior to next HD session  Pharmacy will continue to monitor patient and adjust therapy as indicated    Thank you for the consult,  CHRISTINA Mathias  1/23/2023

## 2023-01-24 NOTE — PROGRESS NOTES
Dialysis called this nurse and stated they will not be here this afternoon for the patient to attempt to have dialysis this afternoon per Dr. Shanell Moulton request. This nurse spoke with the patient and informed her that we are unable to get her to dialysis this afternoon due to dialysis not being there. Patient stated \"ok\". When this nurse asked if she is going to continue refusing dialysis to receive now patient stated \"I am not going\". Patient verbalized understanding if she did not receive dialysis.

## 2023-01-24 NOTE — PROGRESS NOTES
Infectious Disease progress Note        Reason: Perforated diverticulitis    Current abx Prior abx   Zosyn since 1/18 Azithromycin 1/13-1/18     Lines:       Assessment :  81 y.o. female with a PMHx of ESRD on HD, COPD not on home O2, CAD, COVID (7/2022), DM, HTN, HFpEF who presented to the ED on 1/13/2023 with c/o severe SOB  associated with wheezing. Clinical presentation consistent with acute hypoxic respiratory failure-present on admission due to COPD exacerbation superimposed on chronic systolic/diastolic heart failure;   Perforated sigmoid diverticulitis    Status post exploratory laparotomy, sigmoid colectomy and descending colostomy on 1/18/2023. Intraoperative findings noted  Body fluid cx 1/20- esbl e.coli, e. Faecalis, bacteroides    Persistent abdominal pain noted today-likely due to postoperative inflammation. No definitive clinical evidence of new infection/sepsis    Recommendations:    Discontinue piperacillin/tazobactam.  Start p.o. Augmentin. Continue gentamicin-recommend both antibiotics till 1/31/2023- arranged at . D/w dr. Clare Dumont  2. Follow-up surgery recommendations  3. Follow-up pulmonary recommendations  4. Discharge planning per primary team      Above plan was discussed in details with patient, RN, primary team.  Please call me if any further questions or concerns. Will continue to participate in the care of this patient. HPI:    Denies worsening abdominal pain. Asking for frequent Dilaudid per RN. Denies nausea, vomiting, chest pain.     Past Medical History:   Diagnosis Date    Abnormal WBC count 05/17/2016    Anemia     Bilateral shoulder pain 09/27/2016    Chondromalacia of both patellae     Chronic heart failure with preserved ejection fraction ([address] 1/13/2023    COPD (chronic obstructive pulmonary disease) ([facility] [address] 09/2015    mild-mod dz; Dr Nava Salvage    Diabetes [facility]) 2013    Diabetic eye exam ([address] 2016    Dilated cardiomyopathy ([address]     Dyslipidemia     Gout Heart attack ([facility] 10/18/2019    Stented    History of echocardiogram 11/14/2014    Mild LVE. EF 40%. Mild, diffuse hypk. Mild LAE. Mild MR. Hypercholesteremia 01/08/2014    Hypertension 2000    Noncompliance with medications 02/16/2016    Obesity     Orthostatic hypotension 05/17/2016    Osteoarthritis of both knees     Pain management 04/01/2016    Dr. Yadi Donohue     Popliteal cyst, bilateral      Sleep apnea     not using cpap    Stage 3b chronic kidney disease ([facility] 08/08/2022    Vitamin D deficiency 10/16/2014       Past Surgical History:   Procedure Laterality Date    HX HEART CATHETERIZATION  2019    Coronary stent    HX TUBAL LIGATION      IR INSERT TUNL CVC W/O PORT OVER 5 YR  09/21/2022       Current Discharge Medication List        CONTINUE these medications which have NOT CHANGED    Details   albuterol (PROVENTIL HFA, VENTOLIN HFA, PROAIR HFA) 90 mcg/actuation inhaler       atorvastatin (LIPITOR) 80 mg tablet Take 80 mg by mouth nightly. OneTouch Ultra Test strip USE TO TEST BLOOD SUGAR TWICE DAILY      ezetimibe (ZETIA) 10 mg tablet Take 10 mg by mouth daily. hydrALAZINE (APRESOLINE) 100 mg tablet Take 100 mg by mouth two (2) times a day. tiZANidine (ZANAFLEX) 4 mg tablet Take 4 mg by mouth two (2) times daily as needed. metoprolol tartrate (LOPRESSOR) 25 mg tablet Take 1 Tablet by mouth two (2) times a day. Qty: 60 Tablet, Refills: 0      insulin detemir U-100 (Levemir U-100 Insulin) 100 unit/mL injection 15 units daily for diabetes once daily with lunch  Qty: 2 mL, Refills: 0    Associated Diagnoses: Diabetes mellitus type 2, insulin dependent (HCC)      gabapentin (NEURONTIN) 400 mg capsule Take 400 mg by mouth in the morning. albuterol-ipratropium (DUO-NEB) 2.5 mg-0.5 mg/3 ml nebu 3 mL by Nebulization route every six (6) hours as needed for Wheezing. Qty: 30 Nebule, Refills: 0      aspirin 81 mg chewable tablet Take 2 Tabs by mouth daily.   Qty: 60 Tab, Refills: 0 Associated Diagnoses: Coronary artery disease involving native coronary artery of native heart without angina pectoris      umeclidinium-vilanterol (ANORO ELLIPTA) 62.5-25 mcg/actuation inhaler Take 1 Puff by inhalation daily. For COPD  Qty: 3 Inhaler, Refills: 3    Comments: 9/22/15 Dr Elena Smallwood prescribed.  Will order via [facility] if available  Associated Diagnoses: Chronic obstructive pulmonary disease, unspecified COPD type ([facility] [address]             Current Facility-Administered Medications   Medication Dose Route Frequency    GENTAMICIN INFORMATION NOTE   Other Rx Dosing/Monitoring    docusate sodium (COLACE) capsule 100 mg  100 mg Oral BID    heparin (porcine) 1,000 unit/mL injection 3,800 Units  3,800 Units Hemodialysis DIALYSIS TUE, THU & SAT    tiotropium-olodateroL (STIOLTO RESPIMAT) 2.5-2.5 mcg/actuation inhaler 2 Puff  2 Puff Inhalation DAILY    albuterol (ACCUNEB) nebulizer solution 1.25 mg  1.25 mg Nebulization Q6H PRN    oxyCODONE-acetaminophen (PERCOCET) 5-325 mg per tablet 1 Tablet  1 Tablet Oral Q4H PRN    traMADoL (ULTRAM) tablet 50 mg  50 mg Oral Q6H PRN    epoetin tonny-epbx (RETACRIT) injection 4,000 Units  4,000 Units SubCUTAneous Q TUE, THU & SAT    naloxone (NARCAN) injection 0.4 mg  0.4 mg IntraVENous PRN    pantoprazole (PROTONIX) 40 mg in 0.9% sodium chloride 10 mL injection  40 mg IntraVENous Q12H    piperacillin-tazobactam (ZOSYN) 3.375 g in 0.9% sodium chloride (MBP/ADV) 100 mL MBP  3.375 g IntraVENous Q12H    HYDROmorphone (DILAUDID) injection 1 mg  1 mg IntraVENous Q3H PRN    sodium chloride (OCEAN) 0.65 % nasal squeeze bottle 2 Spray  2 Spray Both Nostrils Q2H PRN    [Held by provider] melatonin tablet 5 mg  5 mg Oral QHS    heparin (porcine) injection 5,000 Units  5,000 Units SubCUTAneous Q8H    aspirin chewable tablet 162 mg  162 mg Oral DAILY    atorvastatin (LIPITOR) tablet 80 mg  80 mg Oral QHS    ezetimibe (ZETIA) tablet 10 mg  10 mg Oral DAILY    [Held by provider] gabapentin (NEURONTIN) capsule 400 mg  400 mg Oral DAILY    sodium chloride (NS) flush 5-40 mL  5-40 mL IntraVENous Q8H    sodium chloride (NS) flush 5-40 mL  5-40 mL IntraVENous PRN    acetaminophen (TYLENOL) tablet 650 mg  650 mg Oral Q6H PRN    Or    acetaminophen (TYLENOL) suppository 650 mg  650 mg Rectal Q6H PRN    polyethylene glycol (MIRALAX) packet 17 g  17 g Oral DAILY PRN    bisacodyL (DULCOLAX) suppository 10 mg  10 mg Rectal DAILY PRN    ondansetron (ZOFRAN ODT) tablet 4 mg  4 mg Oral Q8H PRN    Or    ondansetron (ZOFRAN) injection 4 mg  4 mg IntraVENous Q6H PRN    metoprolol tartrate (LOPRESSOR) tablet 25 mg  25 mg Oral BID    insulin lispro (HUMALOG) injection   SubCUTAneous AC&HS    glucose chewable tablet 16 g  4 Tablet Oral PRN    glucagon (GLUCAGEN) injection 1 mg  1 mg IntraMUSCular PRN    dextrose 10% infusion 0-250 mL  0-250 mL IntraVENous PRN    [Held by provider] insulin glargine (LANTUS) injection 5 Units  5 Units SubCUTAneous QHS    guaiFENesin ER (MUCINEX) tablet 600 mg  600 mg Oral Q12H       Allergies: Patient has no known allergies.     Family History   Problem Relation Age of Onset    Diabetes Mother     Hypertension Mother     Hypertension Father     Kidney Disease Maternal Aunt 48        Dialysis     Social History     Socioeconomic History    Marital status:      Spouse name: Not on file    Number of children: 3    Years of education: Not on file    Highest education level: Not on file   Occupational History    Occupation: retired   Tobacco Use    Smoking status: Former     Packs/day: 1.00     Years: 52.00     Pack years: 52.00     Types: Cigarettes     Quit date: 5/20/2019     Years since quitting: 3.6    Smokeless tobacco: Never   Vaping Use    Vaping Use: Never used   Substance and Sexual Activity    Alcohol use: No     Alcohol/week: 0.0 standard drinks    Drug use: Never    Sexual activity: Yes     Partners: Male   Other Topics Concern     Service No    Blood Transfusions No    Caffeine Concern No    Occupational Exposure No    Hobby Hazards No    Sleep Concern No    Stress Concern No    Weight Concern No    Special Diet No    Back Care No    Exercise No    Bike Helmet No    Seat Belt Yes    Self-Exams Yes   Social History Narrative    Not on file     Social Determinants of Health     Financial Resource Strain: Not on file   Food Insecurity: Not on file   Transportation Needs: Not on file   Physical Activity: Not on file   Stress: Not on file   Social Connections: Not on file   Intimate Partner Violence: Not on file   Housing Stability: Not on file     Social History     Tobacco Use   Smoking Status Former    Packs/day: 1.00    Years: 52.00    Pack years: 52.00    Types: Cigarettes    Quit date: 2019    Years since quitting: 3.6   Smokeless Tobacco Never        Temp (24hrs), Av °F (36.7 °C), Min:97.6 °F (36.4 °C), Max:98.5 °F (36.9 °C)    Visit Vitals  BP (!) 148/75 (BP 1 Location: Left upper arm, BP Patient Position: Sitting)   Pulse 64   Temp 98.1 °F (36.7 °C)   Resp 18   Ht 5' 4\" (1.626 m)   Wt 96.4 kg (212 lb 8.4 oz)   SpO2 99%   Breastfeeding Unknown   BMI 36.48 kg/m²       ROS: 12 point ROS obtained in details.  Pertinent positives as mentioned in HPI,   otherwise negative    Physical Exam:       General appearance - alert, well appearing, and in no distress, oriented to person, place, and time   Mental status - alert, oriented to person, place, and time  Eyes - pupils equal and reactive, extraocular eye movements intact  Ears - bilateral TM's and external ear canals normal  Nose - normal and patent, no erythema, discharge or polyps  Mouth - mucous membranes moist, pharynx normal without lesions  Neck - supple, no significant adenopathy  Chest - clear to auscultation, no wheezes, rales or rhonchi, symmetric air entry  Heart - normal rate, regular rhythm, normal S1, S2, no rubs, clicks or gallops  Abdomen - soft, tenderness around the surgical site, colostomy in place  Breasts -rash underneath both breast  Neurological - alert, oriented, normal speech, no focal findings or movement disorder noted  Musculoskeletal - no joint tenderness, deformity or swelling  Extremities - peripheral pulses normal, no pedal edema, no clubbing or cyanosis  Skin - rash on extremities, trunk almost completely resolved     Labs: Results:   Chemistry Recent Labs     01/24/23 0246 01/23/23 0247 01/22/23  0354   * 122* 157*   * 132* 134*   K 4.1 3.9 4.0    99* 100   CO2 26 26 29   BUN 53* 53* 46*   CREA 2.41* 2.50* 2.64*   CA 9.3 8.8 8.8   AGAP 6 7 5   BUCR 22* 21* 17   ALB  --   --  2.8*        CBC w/Diff Recent Labs     01/24/23 0246 01/23/23 0247   WBC 11.3 10.4   RBC 3.37* 3.01*   HGB 9.5* 8.4*   HCT 30.8* 26.8*   * 341   GRANS 65 78*   LYMPH 25 16*   EOS 0 0        Microbiology No results for input(s): CULT in the last 72 hours. RADIOLOGY:    All available imaging studies/reports in [facility] for this admission were reviewed          Disclaimer: Sections of this note are dictated utilizing voice recognition software, which may have resulted in some phonetic based errors in grammar and contents. Even though attempts were made to correct all the mistakes, some may have been missed, and remained in the body of the document. If questions arise, please contact our department.     Dr. Lindy Trammell, Infectious Disease Specialist  [phone]  January 24, 2023  11:52 AM

## 2023-01-24 NOTE — DISCHARGE INSTRUCTIONS
Discharge Instructions Following Surgery    Patient: Charlie Castillo MRN: [id number]  SSN: [id number]    YOB: 1950  Age: 81 y.o. Sex: female      Activity  As tolerated, walking encourage, stairs are okay. Avoid strenuous activities - no lifting anything heavier than 15 pounds till seen in the clinic. You may shower at home    Diet  Regular diet      Wound and Dressing Care  Colostomy care    Call your doctor if  Excessive bleeding that does not stop after holding mild pressure over the area. Temperature of 101 degrees F or above. Redness,excessive swelling or bruising, and/or green or yellow, smelly discharge from incision. If nausea and vomiting continues. Appointment date/time Follow-Up Phone Calls    Call the office of Dr. Mireille Terry at [phone] to make your follow-up appointment in 2 weeks after the surgery    Dr. Marlyn Chow cell phone number is [phone]. Please call me if you have any concerns or questions. DISCHARGE SUMMARY from Nurse    PATIENT INSTRUCTIONS:    After general anesthesia or intravenous sedation, for 24 hours or while taking prescription Narcotics:  Limit your activities  Do not drive and operate hazardous machinery  Do not make important personal or business decisions  Do  not drink alcoholic beverages  If you have not urinated within 8 hours after discharge, please contact your surgeon on call. Report the following to your surgeon:  Excessive pain, swelling, redness or odor of or around the surgical area  Temperature over 100.5  Nausea and vomiting lasting longer than 4 hours or if unable to take medications  Any signs of decreased circulation or nerve impairment to extremity: change in color, persistent  numbness, tingling, coldness or increase pain  Any questions    What to do at Home:  Recommended activity: Activity as tolerated. If you experience any symptoms, please follow up with primary care provider.     *  Please give a list of your current medications to your Primary Care Provider. *  Please update this list whenever your medications are discontinued, doses are      changed, or new medications (including over-the-counter products) are added. *  Please carry medication information at all times in case of emergency situations. These are general instructions for a healthy lifestyle:    No smoking/ No tobacco products/ Avoid exposure to second hand smoke  Surgeon General's Warning:  Quitting smoking now greatly reduces serious risk to your health. Obesity, smoking, and sedentary lifestyle greatly increases your risk for illness    A healthy diet, regular physical exercise & weight monitoring are important for maintaining a healthy lifestyle    You may be retaining fluid if you have a history of heart failure or if you experience any of the following symptoms:  Weight gain of 3 pounds or more overnight or 5 pounds in a week, increased swelling in our hands or feet or shortness of breath while lying flat in bed. Please call your doctor as soon as you notice any of these symptoms; do not wait until your next office visit. The discharge information has been reviewed with the patient. The patient verbalized understanding. Discharge medications reviewed with the patient and appropriate educational materials and side effects teaching were provided.   ___________________________________________________________________________________________________________________________________

## 2023-01-24 NOTE — PROGRESS NOTES
RENAL DAILY PROGRESS NOTE              Subjective:       Complaint:   Overnight events noted  no nausea, vomiting, chest pain  Breathing is better      IMPRESSION:   IMPRESSION:   ESRD TTS  Access: TDC,maturing av fistula on right  arm(placed 3 weeks ago-awaiting rpt procedure for superfilization)   Abdominal perforation due to diverticulitis. s/p procedure and colostomy  COPD excerebration. CAD,cardiomyopathy  Secondary hyperparathyroidism  ACD   PLAN:   Dialysis today  C/w epogen  Diet per surgery  Avoid Gadolinium due to its association with nephrogenic systemic fibrosis in a patients with severe ARF and ESRD. Please dose all medications for creatinine clearance <15/dialysis. Avoid blood pressure checks, blood draws, peripheral iv's on arm with access.                  Current Facility-Administered Medications   Medication Dose Route Frequency    GENTAMICIN INFORMATION NOTE   Other Rx Dosing/Monitoring    docusate sodium (COLACE) capsule 100 mg  100 mg Oral BID    heparin (porcine) 1,000 unit/mL injection 3,800 Units  3,800 Units Hemodialysis DIALYSIS TUE, THU & SAT    tiotropium-olodateroL (STIOLTO RESPIMAT) 2.5-2.5 mcg/actuation inhaler 2 Puff  2 Puff Inhalation DAILY    albuterol (ACCUNEB) nebulizer solution 1.25 mg  1.25 mg Nebulization Q6H PRN    oxyCODONE-acetaminophen (PERCOCET) 5-325 mg per tablet 1 Tablet  1 Tablet Oral Q4H PRN    traMADoL (ULTRAM) tablet 50 mg  50 mg Oral Q6H PRN    epoetin tonny-epbx (RETACRIT) injection 4,000 Units  4,000 Units SubCUTAneous Q TUE, THU & SAT    naloxone (NARCAN) injection 0.4 mg  0.4 mg IntraVENous PRN    pantoprazole (PROTONIX) 40 mg in 0.9% sodium chloride 10 mL injection  40 mg IntraVENous Q12H    piperacillin-tazobactam (ZOSYN) 3.375 g in 0.9% sodium chloride (MBP/ADV) 100 mL MBP  3.375 g IntraVENous Q12H    HYDROmorphone (DILAUDID) injection 1 mg  1 mg IntraVENous Q3H PRN    sodium chloride (OCEAN) 0.65 % nasal squeeze bottle 2 Spray  2 Spray Both Nostrils Q2H PRN    [Held by provider] melatonin tablet 5 mg  5 mg Oral QHS    heparin (porcine) injection 5,000 Units  5,000 Units SubCUTAneous Q8H    aspirin chewable tablet 162 mg  162 mg Oral DAILY    atorvastatin (LIPITOR) tablet 80 mg  80 mg Oral QHS    ezetimibe (ZETIA) tablet 10 mg  10 mg Oral DAILY    [Held by provider] gabapentin (NEURONTIN) capsule 400 mg  400 mg Oral DAILY    sodium chloride (NS) flush 5-40 mL  5-40 mL IntraVENous Q8H    sodium chloride (NS) flush 5-40 mL  5-40 mL IntraVENous PRN    acetaminophen (TYLENOL) tablet 650 mg  650 mg Oral Q6H PRN    Or    acetaminophen (TYLENOL) suppository 650 mg  650 mg Rectal Q6H PRN    polyethylene glycol (MIRALAX) packet 17 g  17 g Oral DAILY PRN    bisacodyL (DULCOLAX) suppository 10 mg  10 mg Rectal DAILY PRN    ondansetron (ZOFRAN ODT) tablet 4 mg  4 mg Oral Q8H PRN    Or    ondansetron (ZOFRAN) injection 4 mg  4 mg IntraVENous Q6H PRN    metoprolol tartrate (LOPRESSOR) tablet 25 mg  25 mg Oral BID    insulin lispro (HUMALOG) injection   SubCUTAneous AC&HS    glucose chewable tablet 16 g  4 Tablet Oral PRN    glucagon (GLUCAGEN) injection 1 mg  1 mg IntraMUSCular PRN    dextrose 10% infusion 0-250 mL  0-250 mL IntraVENous PRN    [Held by provider] insulin glargine (LANTUS) injection 5 Units  5 Units SubCUTAneous QHS    guaiFENesin ER (MUCINEX) tablet 600 mg  600 mg Oral Q12H       Review of Symptoms: comprehensive ROS negative except above. Objective:   Patient Vitals for the past 24 hrs:   Temp Pulse Resp BP SpO2   01/24/23 0828 98.1 °F (36.7 °C) 64 18 (!) 148/75 99 %   01/24/23 0320 97.6 °F (36.4 °C) 64 18 (!) 156/86 99 %   01/24/23 0020 97.8 °F (36.6 °C) 74 18 (!) 159/72 97 %   01/23/23 2057 98.4 °F (36.9 °C) 71 17 (!) 159/66 99 %   01/23/23 1519 98.5 °F (36.9 °C) 66 20 (!) 146/75 97 %   01/23/23 1137 97.8 °F (36.6 °C) 64 18 (!) 154/71 97 %          Weight change: 0.4 kg (14.1 oz)     01/22 1901 - 01/24 0700  In: 2100 [P.O.:1800;  I.V.:300]  Out: 2855 [Urine:2850]    Intake/Output Summary (Last 24 hours) at 1/24/2023 1029  Last data filed at 1/24/2023 0659  Gross per 24 hour   Intake 1140 ml   Output 1455 ml   Net -315 ml       Physical Exam:   General: comfortable, no acute distress   HEENT sclera anicteric, supple neck, no thyromegaly  CVS: S1S2 heard,  no rub  RS: + air entry b/l  Abd: Soft,colostomy  Neuro: non focal, awake, alert , CN II-XII are grossly intact  Extrm: no edema, no cyanosis, clubbing   Skin: no visible  Rash  Musculoskeletal: No gross joints or bone deformities   Access: maturing right arm access      Data Review:     LABS:   Hematology:   Recent Labs     01/24/23 0246 01/23/23 0247   WBC 11.3 10.4   HGB 9.5* 8.4*   HCT 30.8* 26.8*       Chemistry:   Recent Labs     01/24/23  0246 01/23/23 0247 01/22/23  0354   BUN 53* 53* 46*   CREA 2.41* 2.50* 2.64*   CA 9.3 8.8 8.8   ALB  --   --  2.8*   K 4.1 3.9 4.0   * 132* 134*    99* 100   CO2 26 26 29   PHOS  --  3.6 4.2   * 122* 157*              Procedures/imaging: see electronic medical records for all procedures, Xrays and details which were not copied into this note but were reviewed prior to creation of Plan          Assessment & Plan:       See above      Valeda Mohs, MD  1/24/2023

## 2023-01-24 NOTE — PROGRESS NOTES
Patient seen and examined. She is doing much better. She stated her abdominal pain has improved remarkably. She is very hungry and she would like regular diet. She is tolerating her full liquid diet and her colostomy is functioning with gas and stool. Her vitals are normal with no fever or tachycardia and she has no leukocytosis. Her abdomen is soft and nontender and her midline wound is clean. Some ecchymosis but no evidence of any infection with no erythema or drainage. Her colostomy bag is filled with stool.       Plan:  I placed the patient on regular diet/renal  Stop the antibiotic if okay with the primary team and ID team  Dialysis per nephrology recommendation  Ostomy care  Discharge planning  I wrote my discharge instructions in case the patient is discharged and she will follow-up with me in 2 weeks

## 2023-01-24 NOTE — PROGRESS NOTES
Patient colostomy bag changed. Patient colostomy bag full and had a paste-like BM. Patient incision site to the abdomen clean, dry and intact. Patient stoma is black at site.

## 2023-01-24 NOTE — PROGRESS NOTES
Problem: Falls - Risk of  Goal: *Absence of Falls  Description: Document Sanjana Kuo Fall Risk and appropriate interventions in the flowsheet.   Outcome: Progressing Towards Goal  Note: Fall Risk Interventions:            Medication Interventions: Assess postural VS orthostatic hypotension         History of Falls Interventions: Bed/chair exit alarm, Door open when patient unattended, Utilize gait belt for transfer/ambulation         Problem: Patient Education: Go to Patient Education Activity  Goal: Patient/Family Education  Outcome: Progressing Towards Goal     Problem: Patient Education: Go to Patient Education Activity  Goal: Patient/Family Education  Outcome: Progressing Towards Goal     Problem: Patient Education: Go to Patient Education Activity  Goal: Patient/Family Education  Outcome: Progressing Towards Goal     Problem: Nutrition Deficit  Goal: *Optimize nutritional status  Outcome: Progressing Towards Goal     Problem: Chronic Renal Failure  Goal: *Fluid and electrolytes stabilized  Outcome: Progressing Towards Goal     Problem: Patient Education: Go to Patient Education Activity  Goal: Patient/Family Education  Outcome: Progressing Towards Goal     Problem: Pain  Goal: *Control of Pain  Outcome: Progressing Towards Goal     Problem: Patient Education: Go to Patient Education Activity  Goal: Patient/Family Education  Outcome: Progressing Towards Goal

## 2023-01-24 NOTE — DIALYSIS
EMILI              GENERAL ASSESSMENT:      LUNGS:  Rate  SaO2% [] N/A    [x] Clear  [] Coarse  [] Crackles  [] Wheezing        [] Diminished     Location : []RLL   []LLL    []RUL  []ISAURO     Cough: []Productive  []Dry  [x]N/A   Respirations:  [x]Easy  []Labored     Therapy:   [x]RA  []NC  l/min    Mask: []NRB []Venti       O2%                  []Ventilator  []Intubated  [] Trach  [] BiPaP     CARDIAC: [x]Regular      [] Irregular   [] Pericardial Rub  [] JVD        []  Monitored  [] Bedside  [] Remotely monitored [] N/A  Rhythm:      EDEMA: [] None  [x]Generalized  [] Pitting [] 1    [] 2    [] 3    [] 4                 [] Facial  [] Pedal  []  UE  [] LE     SKIN:   [x] Warm  [] Hot     [] Cold   [x] Dry     [] Pale   [] Diaphoretic                  [] Flushed  [] Jaundiced  [] Cyanotic  [] Rash  [] Weeping     LOC:    [x] Alert      [x]Oriented:    [x] Person     [x] Place  [x]Time               [x] Confused  [] Lethargic  [] Medicated  [] Non-responsive     GI / ABDOMEN:  [] Flat    [] Distended    [x] Soft    [] Firm   []  Obese                             [] Diarrhea  [x] Bowel Sounds  [] Nausea  [] Vomiting       / URINE ASSESSMENT:[] Voiding   [x] Oliguria  [] Anuria   []  Frye     [] Incontinent    []  Incontinent Brief      []  Bathroom Privileges       PAIN: [x] 0 []1  []2   []3   []4   []5   []6   []7   []8   []9   []10              Scale 0-10  Action/Follow Up:      MOBILITY:  [] Amb    [] Amb/Assist    [x] Bed    [] Wheelchair  [] Stretcher

## 2023-01-24 NOTE — PROGRESS NOTES
1030 Patient refused dialysis. When this nurse asked why she did not want to receive dialysis today she stated \"Because I don't want to\". Dr. Alexandre Sparks paged. 5 Dr. Alexandre Sparks notified and would like to attempt to get patient to go this afternoon to dialysis.      1034 Dialysis notified of patient refusal and to attempt this afternoon per Dr Samara Sparks request

## 2023-01-24 NOTE — ROUTINE PROCESS
Bedside and Verbal shift change report given to Monie Garcia RN (oncoming nurse) by Danilo Peterson RN (offgoing nurse). Report included the following information SBAR, Kardex, MAR and Recent Results.     SITUATION:  Code Status: Full Code  Reason for Admission: COPD exacerbation ([facility] [J44.1]  Hospital day: 11  Problem List:       Hospital Problems  Date Reviewed: 1/18/2023            Codes Class Noted POA    Sinus tachycardia ICD-10-CM: R00.0  ICD-9-CM: 427.89  1/13/2023 Yes        Chronic heart failure with preserved ejection fraction [facility]) ICD-10-CM: I50.32  ICD-9-CM: 428.9  1/13/2023 Yes        Anemia due to chronic kidney disease, on chronic dialysis ([facility] ICD-10-CM: N18.6, D63.1, Z99.2  ICD-9-CM: 585.6, 285.21, V45.11  1/13/2023 Yes        ESRD (end stage renal disease) on dialysis [facility]) ICD-10-CM: N18.6, Z99.2  ICD-9-CM: 585.6, V45.11  12/8/2022 Yes        * (Principal) COPD with acute exacerbation ([facility] ICD-10-CM: J44.1  ICD-9-CM: 491.21  10/19/2019 Yes        Type 2 diabetes mellitus with hyperglycemia, with long-term current use of insulin (HCC) ICD-10-CM: E11.65, Z79.4  ICD-9-CM: 250.00, 790.29, V58.67  7/12/2018 Yes        Coronary artery disease involving native coronary artery of native heart without angina pectoris ICD-10-CM: I25.10  ICD-9-CM: 414.01  5/31/2018 Yes        Essential hypertension (Chronic) ICD-10-CM: I10  ICD-9-CM: 401.9  9/17/2015 Yes       BACKGROUND:   Past Medical History:   Past Medical History:   Diagnosis Date    Abnormal WBC count 05/17/2016    Anemia     Bilateral shoulder pain 09/27/2016    Chondromalacia of both patellae     Chronic heart failure with preserved ejection fraction ([facility] 1/13/2023    COPD (chronic obstructive pulmonary disease) ([facility] 09/2015    mild-mod dz; Dr Suzy Mendoza    Diabetes [facility]) 2013    Diabetic eye exam ([facility] 2016    Dilated cardiomyopathy ([facility]     Dyslipidemia     Gout     Heart attack ([facility] 10/18/2019    Stented    History of echocardiogram 11/14/2014 Mild LVE. EF 40%. Mild, diffuse hypk. Mild LAE. Mild MR.       Hypercholesteremia 01/08/2014    Hypertension 2000    Noncompliance with medications 02/16/2016    Obesity     Orthostatic hypotension 05/17/2016    Osteoarthritis of both knees     Pain management 04/01/2016    Dr. Angela Swanson     Popliteal cyst, bilateral      Sleep apnea     not using cpap    Stage 3b chronic kidney disease ([facility] 08/08/2022    Vitamin D deficiency 10/16/2014      Patient taking anticoagulants yes    Patient has a defibrillator: no    History of shots YES for example, flu, pneumonia, tetanus   Isolation History NO for example, MRSA, CDiff    ASSESSMENT:  Changes in Assessment Throughout Shift: NONE  Significant Changes in 24 hours (for example, RR/code, fall)  Patient has Central Line: yes Reasons if yes: Dialysis  Patient has Frye Cath: no   Mobility Issues  PT  IV Patency  OR Checklist  Pending Tests    Last Vitals:  Vitals w/ MEWS Score (last day)       Date/Time MEWS Score Pulse Resp Temp BP Level of Consciousness SpO2    01/24/23 0320 1 64 18 97.6 °F (36.4 °C) 156/86 0 99 %    01/24/23 0020 1 74 18 97.8 °F (36.6 °C) 159/72 0 97 %    01/23/23 2057 1 71 17 98.4 °F (36.9 °C) 159/66 0 99 %    01/23/23 1519 1 66 20 98.5 °F (36.9 °C) 146/75 0 97 %    01/23/23 1137 1 64 18 97.8 °F (36.6 °C) 154/71 0 97 %    01/23/23 0835 1 67 20 98.2 °F (36.8 °C) 166/69 0 97 %    01/23/23 0425 1 65 20 97.7 °F (36.5 °C) 155/76 0 98 %    01/23/23 0015 1 67 20 98.1 °F (36.7 °C) 152/69 0 97 %          PAIN    Pain Assessment    Pain Intensity 1: 0 (01/24/23 0651)    Pain Location 1: Abdomen    Pain Intervention(s) 1: Medication (see MAR)    Patient Stated Pain Goal: 0  Intervention effective: yes  Time of last intervention: 0620 Reassessment Completed: yes   Other actions taken for pain: Distraction    Last 3 Weights:  Last 3 Recorded Weights in this Encounter    01/22/23 0529 01/23/23 0321 01/24/23 0620   Weight: 95.8 kg (211 lb 3.2 oz) 96 kg (211 lb 10.3 oz) 96.4 kg (212 lb 8.4 oz)   Weight change: 0.4 kg (14.1 oz)    INTAKE/OUPUT    Current Shift: No intake/output data recorded. Last three shifts: 01/22 1901 - 01/24 0700  In: 2100 [P.O.:1800; I.V.:300]  Out: 2855 [Urine:2850]    RECOMMENDATIONS AND DISCHARGE PLANNING  Patient needs and requests: Pain Management    Pending tests/procedures: labs     Discharge plan for patient: Home    Discharge planning Needs or Barriers: None    Estimated Discharge Date: 1/24/2023 Posted on Whiteboard in Patients Room: yes       \"HEALS\" SAFETY CHECK  A safety check occurred in the patient's room between off going nurse and oncoming nurse listed above. The safety check included the below items:    H  High Alert Medications Verify all high alert medication drips (heparin, PCA, etc.)  E  Equipment Suction is set up for ALL patients (with rosanne)  Red plugs utilized for all equipment (IV pumps, etc.)  WOWs wiped down at end of shift. Room stocked with oxygen, suction, and other unit-specific supplies  A  Alarms Bed alarm is set for fall risk patients  Ensure chair alarm is in place and activated if patient is up in a chair  L  Lines Check IV for any infiltration  Frye bag is empty if patient has a Frye   Tubing and IV bags are labeled  S  Safety  Room is clean, patient is clean, and equipment is clean. Hallways are clear from equipment besides carts. Fall bracelet on for fall risk patients  Ensure room is clear and free of clutter  Suction is set up for ALL patients (with rosanne)  Hallways are clear from equipment besides carts.    Isolation precautions followed, supplies available outside room, sign posted    Sabrina Severs, RN

## 2023-01-24 NOTE — PROGRESS NOTES
[facility] Hospitalist Group  Progress Note    Patient: Virgilio Perez Age: 81 y.o. : 1950 MR#: [id number] SSN: [id number]  Date/Time: 2023    Subjective:     Patient is sitting in bed in no apparent distress, awake and alert    Assessment/Plan:    81year old female w/ h/o ESRD, COPD not on home 02, HFpEF admitted to the ED after presenting with c/o severe SOB and wheezing.    -Abdominal pain, pneumoperitoneum, diverticulitis on CT scan    -COPD w/ acute exacerbation: on IV steroid, bronchodilators, on supplemental 02. Has ambulatory hypoxia. HISTORY OF:  -HTN  -CAD  -HFpEF: last echo in 2022-->EF: 50-55%  -Type 2DM:   -CAD    PLAN:  Status post laparotomy and sigmoid colectomy and colostomy  Diet as per surgeon  On Zosyn, ID is following  Hemodialysis per nephrology  Taper steroids  -Cont bronchodilators, 02 support  -PT and OT are recommending SNF. I discussed PT OT recommendations with the patient but she declined skilled nursing facility/rehab.   Patient wishes to go home with home health care      Dispo: PT is recommending SNF/rehab but patient declines  Additional Notes:      Case discussed with:  [x]Patient  []Family  []Nursing  []Case Management  DVT Prophylaxis:  []Lovenox  [x]Hep SQ  [x]SCDs  []Coumadin   []On Heparin gtt    Objective:   VS: Visit Vitals  BP (!) 146/75 (BP 1 Location: Left upper arm, BP Patient Position: At rest)   Pulse 66   Temp 98.5 °F (36.9 °C)   Resp 20   Ht 5' 4\" (1.626 m)   Wt 96 kg (211 lb 10.3 oz)   SpO2 97%   Breastfeeding Unknown   BMI 36.33 kg/m²      Tmax/24hrs: Temp (24hrs), Av.2 °F (36.8 °C), Min:97.7 °F (36.5 °C), Max:98.6 °F (37 °C)    Input/Output:   Intake/Output Summary (Last 24 hours) at 2023 1911  Last data filed at 2023 1806  Gross per 24 hour   Intake 1800 ml   Output 1905 ml   Net -105 ml         General:  Awake, alert  Cardiovascular:  S1S2+, RRR  Pulmonary:  CTA b/l  GI:  Soft, BS+, NT, ND, has colostomy  Extremities:  trace edema      Labs:    Recent Results (from the past 24 hour(s))   GLUCOSE, POC    Collection Time: 01/22/23  9:17 PM   Result Value Ref Range    Glucose (POC) 142 (H) 70 - 110 mg/dL   CBC WITH AUTOMATED DIFF    Collection Time: 01/23/23  2:47 AM   Result Value Ref Range    WBC 10.4 4.6 - 13.2 K/uL    RBC 3.01 (L) 4.20 - 5.30 M/uL    HGB 8.4 (L) 12.0 - 16.0 g/dL    HCT 26.8 (L) 35.0 - 45.0 %    MCV 89.0 78.0 - 100.0 FL    MCH 27.9 24.0 - 34.0 PG    MCHC 31.3 31.0 - 37.0 g/dL    RDW 17.5 (H) 11.6 - 14.5 %    PLATELET 711 983 - 132 K/uL    MPV 9.9 9.2 - 11.8 FL    NRBC 1.3 (H) 0  WBC    ABSOLUTE NRBC 0.14 (H) 0.00 - 0.01 K/uL    NEUTROPHILS 78 (H) 40 - 73 %    LYMPHOCYTES 16 (L) 21 - 52 %    MONOCYTES 4 3 - 10 %    EOSINOPHILS 0 0 - 5 %    BASOPHILS 0 0 - 2 %    METAMYELOCYTES 1 %    MYELOCYTES 1 %    IMMATURE GRANULOCYTES 0 0.0 - 0.5 %    ABS. NEUTROPHILS 8.1 (H) 1.8 - 8.0 K/UL    ABS. LYMPHOCYTES 1.7 0.9 - 3.6 K/UL    ABS. MONOCYTES 0.4 0.05 - 1.2 K/UL    ABS. EOSINOPHILS 0.0 0.0 - 0.4 K/UL    ABS. BASOPHILS 0.0 0.0 - 0.1 K/UL    ABS. IMM.  GRANS. 0.0 0.00 - 0.04 K/UL    DF MANUAL      PLATELET COMMENTS ADEQUATE PLATELETS      RBC COMMENTS NORMOCYTIC, NORMOCHROMIC     MAGNESIUM    Collection Time: 01/23/23  2:47 AM   Result Value Ref Range    Magnesium 2.1 1.6 - 2.6 mg/dL   METABOLIC PANEL, BASIC    Collection Time: 01/23/23  2:47 AM   Result Value Ref Range    Sodium 132 (L) 136 - 145 mmol/L    Potassium 3.9 3.5 - 5.5 mmol/L    Chloride 99 (L) 100 - 111 mmol/L    CO2 26 21 - 32 mmol/L    Anion gap 7 3.0 - 18 mmol/L    Glucose 122 (H) 74 - 99 mg/dL    BUN 53 (H) 7.0 - 18 MG/DL    Creatinine 2.50 (H) 0.6 - 1.3 MG/DL    BUN/Creatinine ratio 21 (H) 12 - 20      eGFR 20 (L) >60 ml/min/1.73m2    Calcium 8.8 8.5 - 10.1 MG/DL   PHOSPHORUS    Collection Time: 01/23/23  2:47 AM   Result Value Ref Range    Phosphorus 3.6 2.5 - 4.9 MG/DL   GLUCOSE, POC    Collection Time: 01/23/23  8:30 AM Result Value Ref Range    Glucose (POC) 153 (H) 70 - 110 mg/dL   GLUCOSE, POC    Collection Time: 01/23/23 11:24 AM   Result Value Ref Range    Glucose (POC) 111 (H) 70 - 110 mg/dL   GLUCOSE, POC    Collection Time: 01/23/23  3:47 PM   Result Value Ref Range    Glucose (POC) 168 (H) 70 - 110 mg/dL     Additional Data Reviewed:      Dragon medical dictation software was used for portions of this report. Unintended errors may occur.       Signed By: Waqas Allan MD     January 23, 2023

## 2023-01-25 ENCOUNTER — HOME HEALTH ADMISSION (OUTPATIENT)
Dept: HOME HEALTH SERVICES | Facility: HOME HEALTH | Age: 73
End: 2023-01-25
Payer: MEDICARE

## 2023-01-25 VITALS
OXYGEN SATURATION: 96 % | WEIGHT: 212.52 LBS | HEART RATE: 75 BPM | SYSTOLIC BLOOD PRESSURE: 165 MMHG | RESPIRATION RATE: 18 BRPM | DIASTOLIC BLOOD PRESSURE: 71 MMHG | HEIGHT: 64 IN | BODY MASS INDEX: 36.28 KG/M2 | TEMPERATURE: 98.3 F

## 2023-01-25 LAB
ANION GAP SERPL CALC-SCNC: 6 MMOL/L (ref 3–18)
BASOPHILS # BLD: 0 K/UL (ref 0–0.1)
BASOPHILS NFR BLD: 0 % (ref 0–2)
BUN SERPL-MCNC: 40 MG/DL (ref 7–18)
BUN/CREAT SERPL: 19 (ref 12–20)
CALCIUM SERPL-MCNC: 8.9 MG/DL (ref 8.5–10.1)
CHLORIDE SERPL-SCNC: 104 MMOL/L (ref 100–111)
CO2 SERPL-SCNC: 28 MMOL/L (ref 21–32)
CREAT SERPL-MCNC: 2.14 MG/DL (ref 0.6–1.3)
DIFFERENTIAL METHOD BLD: ABNORMAL
EOSINOPHIL # BLD: 0 K/UL (ref 0–0.4)
EOSINOPHIL NFR BLD: 0 % (ref 0–5)
ERYTHROCYTE [DISTWIDTH] IN BLOOD BY AUTOMATED COUNT: 17.9 % (ref 11.6–14.5)
GLUCOSE BLD STRIP.AUTO-MCNC: 110 MG/DL (ref 70–110)
GLUCOSE SERPL-MCNC: 123 MG/DL (ref 74–99)
HCT VFR BLD AUTO: 29.7 % (ref 35–45)
HGB BLD-MCNC: 9.2 G/DL (ref 12–16)
IMM GRANULOCYTES # BLD AUTO: 0 K/UL (ref 0–0.04)
IMM GRANULOCYTES NFR BLD AUTO: 0 % (ref 0–0.5)
LYMPHOCYTES # BLD: 1.1 K/UL (ref 0.9–3.6)
LYMPHOCYTES NFR BLD: 9 % (ref 21–52)
MAGNESIUM SERPL-MCNC: 2.1 MG/DL (ref 1.6–2.6)
MCH RBC QN AUTO: 28.1 PG (ref 24–34)
MCHC RBC AUTO-ENTMCNC: 31 G/DL (ref 31–37)
MCV RBC AUTO: 90.8 FL (ref 78–100)
METAMYELOCYTES NFR BLD MANUAL: 2 %
MONOCYTES # BLD: 0.7 K/UL (ref 0.05–1.2)
MONOCYTES NFR BLD: 6 % (ref 3–10)
MYELOCYTES NFR BLD MANUAL: 2 %
NEUTS SEG # BLD: 9.6 K/UL (ref 1.8–8)
NEUTS SEG NFR BLD: 81 % (ref 40–73)
NRBC # BLD: 0.15 K/UL (ref 0–0.01)
NRBC BLD-RTO: 1.3 PER 100 WBC
PLATELET # BLD AUTO: 380 K/UL (ref 135–420)
PLATELET COMMENTS,PCOM: ABNORMAL
PMV BLD AUTO: 10.3 FL (ref 9.2–11.8)
POTASSIUM SERPL-SCNC: 3.6 MMOL/L (ref 3.5–5.5)
RBC # BLD AUTO: 3.27 M/UL (ref 4.2–5.3)
RBC MORPH BLD: ABNORMAL
RBC MORPH BLD: ABNORMAL
SODIUM SERPL-SCNC: 138 MMOL/L (ref 136–145)
WBC # BLD AUTO: 11.9 K/UL (ref 4.6–13.2)

## 2023-01-25 PROCEDURE — 82962 GLUCOSE BLOOD TEST: CPT

## 2023-01-25 PROCEDURE — 74011250636 HC RX REV CODE- 250/636: Performed by: PHYSICIAN ASSISTANT

## 2023-01-25 PROCEDURE — 74011000250 HC RX REV CODE- 250: Performed by: EMERGENCY MEDICINE

## 2023-01-25 PROCEDURE — 85025 COMPLETE CBC W/AUTO DIFF WBC: CPT

## 2023-01-25 PROCEDURE — 74011250636 HC RX REV CODE- 250/636: Performed by: EMERGENCY MEDICINE

## 2023-01-25 PROCEDURE — 74011250636 HC RX REV CODE- 250/636: Performed by: SURGERY

## 2023-01-25 PROCEDURE — 83735 ASSAY OF MAGNESIUM: CPT

## 2023-01-25 PROCEDURE — 74011250637 HC RX REV CODE- 250/637: Performed by: HOSPITALIST

## 2023-01-25 PROCEDURE — 74011250637 HC RX REV CODE- 250/637: Performed by: SURGERY

## 2023-01-25 PROCEDURE — 74011000250 HC RX REV CODE- 250: Performed by: SURGERY

## 2023-01-25 PROCEDURE — 74011250637 HC RX REV CODE- 250/637: Performed by: PHYSICIAN ASSISTANT

## 2023-01-25 PROCEDURE — 36415 COLL VENOUS BLD VENIPUNCTURE: CPT

## 2023-01-25 PROCEDURE — 80048 BASIC METABOLIC PNL TOTAL CA: CPT

## 2023-01-25 PROCEDURE — C9113 INJ PANTOPRAZOLE SODIUM, VIA: HCPCS | Performed by: EMERGENCY MEDICINE

## 2023-01-25 PROCEDURE — 74011000258 HC RX REV CODE- 258: Performed by: SURGERY

## 2023-01-25 RX ORDER — AMOXICILLIN AND CLAVULANATE POTASSIUM 500; 125 MG/1; MG/1
1 TABLET, FILM COATED ORAL EVERY 12 HOURS
Qty: 12 TABLET | Refills: 0 | Status: SHIPPED | OUTPATIENT
Start: 2023-01-25 | End: 2023-01-31

## 2023-01-25 RX ORDER — AMOXICILLIN AND CLAVULANATE POTASSIUM 500; 125 MG/1; MG/1
1 TABLET, FILM COATED ORAL EVERY 12 HOURS
Status: DISCONTINUED | OUTPATIENT
Start: 2023-01-25 | End: 2023-01-25 | Stop reason: HOSPADM

## 2023-01-25 RX ORDER — OXYCODONE AND ACETAMINOPHEN 5; 325 MG/1; MG/1
1 TABLET ORAL
Qty: 16 TABLET | Refills: 0 | Status: SHIPPED | OUTPATIENT
Start: 2023-01-25 | End: 2023-01-30

## 2023-01-25 RX ORDER — DOCUSATE SODIUM 100 MG/1
100 CAPSULE, LIQUID FILLED ORAL 2 TIMES DAILY
Qty: 60 CAPSULE | Refills: 0 | Status: SHIPPED | OUTPATIENT
Start: 2023-01-25

## 2023-01-25 RX ORDER — POLYETHYLENE GLYCOL 3350 17 G/17G
17 POWDER, FOR SOLUTION ORAL
Qty: 15 EACH | Refills: 0 | Status: SHIPPED | OUTPATIENT
Start: 2023-01-25

## 2023-01-25 RX ORDER — PANTOPRAZOLE SODIUM 40 MG/1
40 TABLET, DELAYED RELEASE ORAL DAILY
Qty: 30 TABLET | Refills: 0 | Status: SHIPPED | OUTPATIENT
Start: 2023-01-25

## 2023-01-25 RX ADMIN — PIPERACILLIN AND TAZOBACTAM 3.38 G: 3; .375 INJECTION, POWDER, FOR SOLUTION INTRAVENOUS at 08:54

## 2023-01-25 RX ADMIN — HYDROMORPHONE HYDROCHLORIDE 1 MG: 1 INJECTION, SOLUTION INTRAMUSCULAR; INTRAVENOUS; SUBCUTANEOUS at 04:40

## 2023-01-25 RX ADMIN — EZETIMIBE 10 MG: 10 TABLET ORAL at 08:53

## 2023-01-25 RX ADMIN — PANTOPRAZOLE SODIUM 40 MG: 40 INJECTION, POWDER, FOR SOLUTION INTRAVENOUS at 08:53

## 2023-01-25 RX ADMIN — GUAIFENESIN 600 MG: 600 TABLET, EXTENDED RELEASE ORAL at 08:53

## 2023-01-25 RX ADMIN — METOPROLOL TARTRATE 25 MG: 25 TABLET, FILM COATED ORAL at 08:53

## 2023-01-25 RX ADMIN — DOCUSATE SODIUM 100 MG: 100 CAPSULE, LIQUID FILLED ORAL at 08:56

## 2023-01-25 RX ADMIN — ASPIRIN 81 MG CHEWABLE TABLET 162 MG: 81 TABLET CHEWABLE at 08:53

## 2023-01-25 RX ADMIN — TIOTROPIUM BROMIDE AND OLODATEROL 2 PUFF: 3.124; 2.736 SPRAY, METERED RESPIRATORY (INHALATION) at 07:00

## 2023-01-25 RX ADMIN — OXYCODONE HYDROCHLORIDE AND ACETAMINOPHEN 1 TABLET: 5; 325 TABLET ORAL at 09:08

## 2023-01-25 RX ADMIN — SODIUM CHLORIDE, PRESERVATIVE FREE 10 ML: 5 INJECTION INTRAVENOUS at 05:14

## 2023-01-25 RX ADMIN — HYDROMORPHONE HYDROCHLORIDE 1 MG: 1 INJECTION, SOLUTION INTRAMUSCULAR; INTRAVENOUS; SUBCUTANEOUS at 00:25

## 2023-01-25 RX ADMIN — HEPARIN SODIUM 5000 UNITS: 5000 INJECTION INTRAVENOUS; SUBCUTANEOUS at 02:17

## 2023-01-25 NOTE — PROGRESS NOTES
RENAL DAILY PROGRESS NOTE              Subjective:       Complaint:   Overnight events noted  no nausea, vomiting, chest pain  Breathing is better      IMPRESSION:   IMPRESSION:   ESRD TTS  Access: TDC,maturing av fistula on right  arm(placed 3 weeks ago-awaiting rpt procedure for superfilization)   Abdominal perforation due to diverticulitis. s/p procedure and colostomy  COPD excerebration. CAD,cardiomyopathy  Secondary hyperparathyroidism  ACD   PLAN:   Called gentamicin orders to op unit  C/w epogen  Diet per surgery  Avoid Gadolinium due to its association with nephrogenic systemic fibrosis in a patients with severe ARF and ESRD. Please dose all medications for creatinine clearance <15/dialysis. Avoid blood pressure checks, blood draws, peripheral iv's on arm with access.                  Current Facility-Administered Medications   Medication Dose Route Frequency    GENTAMICIN INFORMATION NOTE   Other Rx Dosing/Monitoring    docusate sodium (COLACE) capsule 100 mg  100 mg Oral BID    heparin (porcine) 1,000 unit/mL injection 3,800 Units  3,800 Units Hemodialysis DIALYSIS TUE, THU & SAT    tiotropium-olodateroL (STIOLTO RESPIMAT) 2.5-2.5 mcg/actuation inhaler 2 Puff  2 Puff Inhalation DAILY    albuterol (ACCUNEB) nebulizer solution 1.25 mg  1.25 mg Nebulization Q6H PRN    oxyCODONE-acetaminophen (PERCOCET) 5-325 mg per tablet 1 Tablet  1 Tablet Oral Q4H PRN    traMADoL (ULTRAM) tablet 50 mg  50 mg Oral Q6H PRN    epoetin tonny-epbx (RETACRIT) injection 4,000 Units  4,000 Units SubCUTAneous Q TUE, THU & SAT    naloxone (NARCAN) injection 0.4 mg  0.4 mg IntraVENous PRN    pantoprazole (PROTONIX) 40 mg in 0.9% sodium chloride 10 mL injection  40 mg IntraVENous Q12H    piperacillin-tazobactam (ZOSYN) 3.375 g in 0.9% sodium chloride (MBP/ADV) 100 mL MBP  3.375 g IntraVENous Q12H    HYDROmorphone (DILAUDID) injection 1 mg  1 mg IntraVENous Q3H PRN    sodium chloride (OCEAN) 0.65 % nasal squeeze bottle 2 Spray  2 Spray Both Nostrils Q2H PRN    [Held by provider] melatonin tablet 5 mg  5 mg Oral QHS    heparin (porcine) injection 5,000 Units  5,000 Units SubCUTAneous Q8H    aspirin chewable tablet 162 mg  162 mg Oral DAILY    atorvastatin (LIPITOR) tablet 80 mg  80 mg Oral QHS    ezetimibe (ZETIA) tablet 10 mg  10 mg Oral DAILY    [Held by provider] gabapentin (NEURONTIN) capsule 400 mg  400 mg Oral DAILY    sodium chloride (NS) flush 5-40 mL  5-40 mL IntraVENous Q8H    sodium chloride (NS) flush 5-40 mL  5-40 mL IntraVENous PRN    acetaminophen (TYLENOL) tablet 650 mg  650 mg Oral Q6H PRN    Or    acetaminophen (TYLENOL) suppository 650 mg  650 mg Rectal Q6H PRN    polyethylene glycol (MIRALAX) packet 17 g  17 g Oral DAILY PRN    bisacodyL (DULCOLAX) suppository 10 mg  10 mg Rectal DAILY PRN    ondansetron (ZOFRAN ODT) tablet 4 mg  4 mg Oral Q8H PRN    Or    ondansetron (ZOFRAN) injection 4 mg  4 mg IntraVENous Q6H PRN    metoprolol tartrate (LOPRESSOR) tablet 25 mg  25 mg Oral BID    insulin lispro (HUMALOG) injection   SubCUTAneous AC&HS    glucose chewable tablet 16 g  4 Tablet Oral PRN    glucagon (GLUCAGEN) injection 1 mg  1 mg IntraMUSCular PRN    dextrose 10% infusion 0-250 mL  0-250 mL IntraVENous PRN    [Held by provider] insulin glargine (LANTUS) injection 5 Units  5 Units SubCUTAneous QHS    guaiFENesin ER (MUCINEX) tablet 600 mg  600 mg Oral Q12H       Review of Symptoms: comprehensive ROS negative except above.    Objective:   Patient Vitals for the past 24 hrs:   Temp Pulse Resp BP SpO2   01/25/23 0820 98.3 °F (36.8 °C) 75 18 (!) 165/71 96 %   01/25/23 0445 97.8 °F (36.6 °C) 75 18 (!) 151/75 98 %   01/24/23 2314 97.3 °F (36.3 °C) 69 18 127/66 99 %   01/24/23 2054 98.2 °F (36.8 °C) 63 18 (!) 145/70 98 %   01/24/23 1626 97.7 °F (36.5 °C) 62 18 (!) 145/81 100 %   01/24/23 1550 97 °F (36.1 °C) 68 18 (!) 168/52 --   01/24/23 1542 -- 67 18 (!) 157/75 --   01/24/23 1530 -- 68 18 (!) 154/80 --   01/24/23 1515 -- 69 18 139/80 --   01/24/23 1500 -- (!) 56 18 133/82 --   01/24/23 1445 -- 62 18 136/69 --   01/24/23 1430 -- 64 18 138/76 --   01/24/23 1415 -- 62 18 134/69 --   01/24/23 1400 -- (!) 59 18 (!) 155/77 --   01/24/23 1345 -- 60 18 (!) 162/78 --   01/24/23 1339 -- 68 18 (!) 158/89 --   01/24/23 1336 97.2 °F (36.2 °C) 74 18 (!) 163/85 --   01/24/23 1204 97.5 °F (36.4 °C) 75 18 (!) 145/77 99 %   01/24/23 1135 -- -- -- (!) 147/77 --          Weight change:      01/23 1901 - 01/25 0700  In: 1500 [P.O.:1200;  I.V.:300]  Out: 3650 [Urine:1150]    Intake/Output Summary (Last 24 hours) at 1/25/2023 1129  Last data filed at 1/25/2023 0841  Gross per 24 hour   Intake 1560 ml   Output 2700 ml   Net -1140 ml       Physical Exam:   General: comfortable, no acute distress   HEENT sclera anicteric, supple neck, no thyromegaly  CVS: S1S2 heard,  no rub  RS: + air entry b/l  Abd: Soft,colostomy  Neuro: non focal, awake, alert , CN II-XII are grossly intact  Extrm: no edema, no cyanosis, clubbing   Skin: no visible  Rash  Musculoskeletal: No gross joints or bone deformities   Access: maturing right arm access      Data Review:     LABS:   Hematology:   Recent Labs     01/25/23  0250 01/24/23  0246 01/23/23  0247   WBC 11.9 11.3 10.4   HGB 9.2* 9.5* 8.4*   HCT 29.7* 30.8* 26.8*       Chemistry:   Recent Labs     01/25/23  0250 01/24/23  0246 01/23/23  0247   BUN 40* 53* 53*   CREA 2.14* 2.41* 2.50*   CA 8.9 9.3 8.8   K 3.6 4.1 3.9    133* 132*    101 99*   CO2 28 26 26   PHOS  --   --  3.6   * 114* 122*              Procedures/imaging: see electronic medical records for all procedures, Xrays and details which were not copied into this note but were reviewed prior to creation of Plan          Assessment & Plan:       See above      Cam Ferrera MD  1/25/2023

## 2023-01-25 NOTE — ANESTHESIA POSTPROCEDURE EVALUATION
Procedure(s):  LAPAROTOMY EXPLORATORY, COLECTOMY, COLOSTOMY CREATION.     general    Anesthesia Post Evaluation      Multimodal analgesia: multimodal analgesia used between 6 hours prior to anesthesia start to PACU discharge  Patient location during evaluation: bedside  Patient participation: complete - patient participated  Level of consciousness: awake  Pain management: adequate  Airway patency: patent  Anesthetic complications: no  Cardiovascular status: stable  Respiratory status: acceptable  Hydration status: acceptable  Post anesthesia nausea and vomiting:  controlled      INITIAL Post-op Vital signs:   Vitals Value Taken Time   /71 01/19/23 2028   Temp 37.2 °C (99 °F) 01/19/23 2028   Pulse 115 01/19/23 2028   Resp 20 01/19/23 2028   SpO2 98 % 01/19/23 2028

## 2023-01-25 NOTE — PROGRESS NOTES
Discharge/Transition Planning     Called [facility] and referral for home health accepted.  Placed in work que and updated AVS       Aure Matamoros RN BSN  /Discharge Planner

## 2023-01-25 NOTE — HOME CARE
Received home health referral for [facility] for (SN, PT, OT). Discharge order for today. Spoke with patient's spouse via phone; attempted to explain home health care services and routines. Spouse states patient declines [facility] services. This writer met both patient and spouse in room with floor RN present as well. To Explain home health services. Patient states, \"I don't want anybody coming to my house;  (He) can change my bag everyday\". This writer explained difference between personal care and skilled care. Patient continues to decline services. This writer did explain to patient would have to contact and update the surgeon and ordering MD of patient declining [facility] services. Patient then stops this writer, and then agrees to proceed with skilled [facility] care services. Demographics verified including insurance, phone and address confirmed. Caregivers available resides with spouse - who does work during daytime hours - is available as primary caregiver. Per Floor RN - written in d/c charge papers. Patient needs to contact all Mds to make follow up appointments. Patient verbalized understanding. Orders noted and arranged to be processed by central intake.         ---   Suresh Abbasi LPN  [facility] - INPATIENT Liaison

## 2023-01-25 NOTE — PROGRESS NOTES
[facility] Hospitalist Group  Progress Note    Patient: Neysa Meckel Age: 81 y.o. : 1950 MR#: [id number] SSN: [id number]  Date/Time: 2023    Subjective:     Patient is sitting in bed in no apparent distress, awake, follows commands    Assessment/Plan:    81year old female w/ h/o ESRD, COPD not on home 02, HFpEF admitted to the ED after presenting with c/o severe SOB and wheezing.    -Abdominal pain, pneumoperitoneum, diverticulitis on CT scan    -COPD w/ acute exacerbation: on IV steroid, bronchodilators, on supplemental 02. Has ambulatory hypoxia.     HISTORY OF:  -HTN  -CAD  -HFpEF: last echo in 2022-->EF: 50-55%  -Type 2DM:   -CAD    PLAN:  Status post laparotomy and sigmoid colectomy and colostomy  Advance diet as per surgeon  Antibiotics per ID  Hemodialysis per nephrology  Taper steroids  -Cont bronchodilators, 02 support  Disposition-Home with home health care soon      Additional Notes:      Case discussed with:  [x]Patient  []Family  []Nursing  []Case Management  DVT Prophylaxis:  []Lovenox  [x]Hep SQ  [x]SCDs  []Coumadin   []On Heparin gtt    Objective:   VS: Visit Vitals  BP (!) 145/81 (BP 1 Location: Left upper arm, BP Patient Position: Lying)   Pulse 62   Temp 97.7 °F (36.5 °C)   Resp 18   Ht 5' 4\" (1.626 m)   Wt 96.4 kg (212 lb 8.4 oz)   SpO2 100%   Breastfeeding Unknown   BMI 36.48 kg/m²      Tmax/24hrs: Temp (24hrs), Av.7 °F (36.5 °C), Min:97 °F (36.1 °C), Max:98.4 °F (36.9 °C)    Input/Output:   Intake/Output Summary (Last 24 hours) at 2023  Last data filed at 2023 1542  Gross per 24 hour   Intake 1500 ml   Output 3450 ml   Net -1950 ml         General:  Awake, alert  Cardiovascular:  S1S2+, RRR  Pulmonary:  CTA b/l  GI:  Soft, BS+, NT, ND, has colostomy with feces inside  Extremities:  trace edema        Labs:    Recent Results (from the past 24 hour(s))   GLUCOSE, POC    Collection Time: 23  9:25 PM   Result Value Ref Range Glucose (POC) 119 (H) 70 - 110 mg/dL   CBC WITH AUTOMATED DIFF    Collection Time: 01/24/23  2:46 AM   Result Value Ref Range    WBC 11.3 4.6 - 13.2 K/uL    RBC 3.37 (L) 4.20 - 5.30 M/uL    HGB 9.5 (L) 12.0 - 16.0 g/dL    HCT 30.8 (L) 35.0 - 45.0 %    MCV 91.4 78.0 - 100.0 FL    MCH 28.2 24.0 - 34.0 PG    MCHC 30.8 (L) 31.0 - 37.0 g/dL    RDW 17.5 (H) 11.6 - 14.5 %    PLATELET 703 (H) 777 - 420 K/uL    MPV 10.0 9.2 - 11.8 FL    NRBC 1.0 (H) 0  WBC    ABSOLUTE NRBC 0.11 (H) 0.00 - 0.01 K/uL    NEUTROPHILS 65 40 - 73 %    BAND NEUTROPHILS 2 %    LYMPHOCYTES 25 21 - 52 %    MONOCYTES 6 3 - 10 %    EOSINOPHILS 0 0 - 5 %    BASOPHILS 0 0 - 2 %    METAMYELOCYTES 1 %    MYELOCYTES 1 %    IMMATURE GRANULOCYTES 0 0.0 - 0.5 %    ABS. NEUTROPHILS 7.6 1.8 - 8.0 K/UL    ABS. LYMPHOCYTES 2.8 0.9 - 3.6 K/UL    ABS. MONOCYTES 0.7 0.05 - 1.2 K/UL    ABS. EOSINOPHILS 0.0 0.0 - 0.4 K/UL    ABS. BASOPHILS 0.0 0.0 - 0.1 K/UL    ABS. IMM.  GRANS. 0.0 0.00 - 0.04 K/UL    DF MANUAL      PLATELET COMMENTS Increased Platelets      RBC COMMENTS ANISOCYTOSIS  1+       METABOLIC PANEL, BASIC    Collection Time: 01/24/23  2:46 AM   Result Value Ref Range    Sodium 133 (L) 136 - 145 mmol/L    Potassium 4.1 3.5 - 5.5 mmol/L    Chloride 101 100 - 111 mmol/L    CO2 26 21 - 32 mmol/L    Anion gap 6 3.0 - 18 mmol/L    Glucose 114 (H) 74 - 99 mg/dL    BUN 53 (H) 7.0 - 18 MG/DL    Creatinine 2.41 (H) 0.6 - 1.3 MG/DL    BUN/Creatinine ratio 22 (H) 12 - 20      eGFR 21 (L) >60 ml/min/1.73m2    Calcium 9.3 8.5 - 10.1 MG/DL   MAGNESIUM    Collection Time: 01/24/23  2:46 AM   Result Value Ref Range    Magnesium 2.0 1.6 - 2.6 mg/dL   GLUCOSE, POC    Collection Time: 01/24/23  8:32 AM   Result Value Ref Range    Glucose (POC) 107 70 - 110 mg/dL   GLUCOSE, POC    Collection Time: 01/24/23 12:03 PM   Result Value Ref Range    Glucose (POC) 168 (H) 70 - 110 mg/dL   GLUCOSE, POC    Collection Time: 01/24/23  5:54 PM   Result Value Ref Range    Glucose (POC) 277 (H) 70 - 110 mg/dL     Additional Data Reviewed:      Dragon medical dictation software was used for portions of this report. Unintended errors may occur.       Signed By: Luis Carlos Veras MD     January 24, 2023

## 2023-01-25 NOTE — PROGRESS NOTES
Patient IV removed. This nurse attempted to start new IV and was unsuccessful with finding a vein. Patient IV abx is on hold until IV is started.

## 2023-01-25 NOTE — PROGRESS NOTES
Patient called for help changing colostomy bag. This nurse entered the room and patient was packed ready to leave. This nurse informed patient she does not have a discharge order in place at this time for me to release her. Patient became upset stating \"well my doctor said I can go home\". I informed patient I will page the MD to see if she is being discharged. Colostomy bag was changed by this nurse. MD paged.

## 2023-01-25 NOTE — DISCHARGE SUMMARY
[address] [place] [address] Dr Matt Petty [facility], [address]     DISCHARGE SUMMARY    Name: Lisa Flaherty MRN: [id number]   Age / Sex: 81 y.o. / female CSN: [id number]   YOB: 1950 Length of Stay: 12 days   Admit Date: 1/13/2023 Discharge Date:        PRIMARY CARE PHYSICIAN: Sheela Motley MD      DISCHARGE DIAGNOSES:    ***    CONSULTS CALLED: ***      PROCEDURES DONE: ***      COURSE IN [facility]:       MEDICATIONS ON DISCHARGE:    Discharge Medication List as of 1/25/2023 11:55 AM        START taking these medications    Details   amoxicillin-clavulanate (AUGMENTIN) 500-125 mg per tablet Take 1 Tablet by mouth every twelve (12) hours for 6 days. , Normal, Disp-12 Tablet, R-0      docusate sodium (COLACE) 100 mg capsule Take 1 Capsule by mouth two (2) times a day., Normal, Disp-60 Capsule, R-0      oxyCODONE-acetaminophen (PERCOCET) 5-325 mg per tablet Take 1 Tablet by mouth every six (6) hours as needed for Pain for up to 5 days. Max Daily Amount: 4 Tablets., Normal, Disp-16 Tablet, R-0      polyethylene glycol (MIRALAX) 17 gram packet Take 1 Packet by mouth daily as needed for Constipation. , Normal, Disp-15 Each, R-0      pantoprazole (Protonix) 40 mg tablet Take 1 Tablet by mouth daily. , Normal, Disp-30 Tablet, R-0      !! OTHER Gentamicin to be given with hemodialysis through January 31, 2023. Nephrology to arrange, Print, Disp-1 Each, R-0      !! OTHER Incentive spirometry-use as directed, Print, Disp-1 Each, R-0      !! OTHER Graded compression stockings-bilateral lower extremities-use as directed, Print, Disp-1 Each, R-0       !! - Potential duplicate medications found. Please discuss with provider.         CONTINUE these medications which have NOT CHANGED    Details   albuterol (PROVENTIL HFA, VENTOLIN HFA, PROAIR HFA) 90 mcg/actuation inhaler Historical Med      atorvastatin (LIPITOR) 80 mg tablet Take 80 mg by mouth nightly., Historical Med      OneTouch Ultra Test strip USE TO TEST BLOOD SUGAR TWICE DAILY, Historical Med, ISABEL      ezetimibe (ZETIA) 10 mg tablet Take 10 mg by mouth daily. , Historical Med      metoprolol tartrate (LOPRESSOR) 25 mg tablet Take 1 Tablet by mouth two (2) times a day., Normal, Disp-60 Tablet, R-0      gabapentin (NEURONTIN) 400 mg capsule Take 400 mg by mouth in the morning., Historical Med      albuterol-ipratropium (DUO-NEB) 2.5 mg-0.5 mg/3 ml nebu 3 mL by Nebulization route every six (6) hours as needed for Wheezing., Normal, Disp-30 Nebule, R-0      aspirin 81 mg chewable tablet Take 2 Tabs by mouth daily. , Normal, Disp-60 Tab, R-0      umeclidinium-vilanterol (ANORO ELLIPTA) 62.5-25 mcg/actuation inhaler Take 1 Puff by inhalation daily. For COPD, Program9/22/15 Dr Keyana Cummings prescribed. Will order via [facility] if availableDisp-3 Inhaler, R-3           STOP taking these medications       hydrALAZINE (APRESOLINE) 100 mg tablet Comments:   Reason for Stopping:         tiZANidine (ZANAFLEX) 4 mg tablet Comments:   Reason for Stopping:         insulin detemir U-100 (Levemir U-100 Insulin) 100 unit/mL injection Comments:   Reason for Stopping:                 DISCHARGE VITAL SIGNS:  Visit Vitals  BP (!) 165/71 (BP 1 Location: Left upper arm, BP Patient Position: Semi fowlers; Lying)   Pulse 75   Temp 98.3 °F (36.8 °C)   Resp 18   Ht 5' 4\" (1.626 m)   Wt 96.4 kg (212 lb 8.4 oz)   SpO2 96%   Breastfeeding Unknown   BMI 36.48 kg/m²       DISCHARGE PHYSICAL EXAMINATION:  ***    CONDITION ON DISCHARGE: Stable.       DISPOSITION:       FOLLOW-UP RECOMMENDATIONS:   Follow-up Information       Follow up With Specialties Details Why [address] [place] [address] Follow up home health nurse to call prior to coming to home [address] \[facility] [place] [facility]\"" [address]    Sarbjit Lei MD Internal Medicine Physician   [address] [facility] [address] [phone]              OTHER INSTRUCTIONS:        TIME SPENT ON DISCHARGE ACTIVITIES: More than 35 minutes. Dragon medical dictation software was used for portions of this report. Unintended errors may occur.       Signed:  Sharon Rangel MD      1/25/2023

## 2023-01-25 NOTE — PROGRESS NOTES
Patient seen and examined. She continues to do well. She stated that she tolerated a regular diet and her colostomy is functioning well. She denies any abdominal pain and she would like to go home today if possible. Her vitals shows no fever or tachycardia and her WBC is normal.  She was switched to p.o. antibiotics    Her abdomen is soft and nontender and her midline wound is clean with no erythema or drainage. Her colostomy bag is filled with stool. Plan:  Regular diet  Dialysis per nephrology recommendation  Continue with the p.o.  Augmentin per ID recommendation till the end of the month  Discharge home if it is okay with the primary team  Follow-up with me in 2 weeks in the office  I wrote my discharge instructions  I will sign off for now

## 2023-01-25 NOTE — PROGRESS NOTES
Patient is sitting in bed in no apparent distress. Patient is awake and alert. Patient wishes to go home. Home with home health care today. I discussed discharge plans and medications with the patient and to apply understanding and agreed. Initially patient was declining home health care services but I counseled her and then she agreed. Home today with home health care. Discussed with RN.

## 2023-01-25 NOTE — PROGRESS NOTES
Infectious Disease progress Note        Reason: Perforated diverticulitis    Current abx Prior abx   Zosyn since 1/18-1/23  Augmentin since 1/24  Gentamicin since 1/23 Azithromycin 1/13-1/18     Lines:       Assessment :  81 y.o. female with a PMHx of ESRD on HD, COPD not on home O2, CAD, COVID (7/2022), DM, HTN, HFpEF who presented to the ED on 1/13/2023 with c/o severe SOB  associated with wheezing. Clinical presentation consistent with acute hypoxic respiratory failure-present on admission due to COPD exacerbation superimposed on chronic systolic/diastolic heart failure;   Perforated sigmoid diverticulitis    Status post exploratory laparotomy, sigmoid colectomy and descending colostomy on 1/18/2023. Intraoperative findings noted  Body fluid cx 1/20- esbl e.coli, e. Faecalis, bacteroides    Niccoli improving. Improved abdominal pain    Recommendations:    cont p.o. Augmentin. Continue gentamicin-recommend both antibiotics till 1/31/2023- arranged at HD. D/w dr. Alfred Vanegas  2. Follow-up surgery recommendations  3. Follow-up pulmonary recommendations  4. Discharge planning per primary team      Above plan was discussed in details with patient, RN, primary team.  Please call me if any further questions or concerns. Will continue to participate in the care of this patient. HPI:    Denies worsening abdominal pain. Denies nausea, vomiting, chest pain. Past Medical History:   Diagnosis Date    Abnormal WBC count 05/17/2016    Anemia     Bilateral shoulder pain 09/27/2016    Chondromalacia of both patellae     Chronic heart failure with preserved ejection fraction ([address] 1/13/2023    COPD (chronic obstructive pulmonary disease) ([address] 09/2015    mild-mod dz; Dr William Manuel    Diabetes [facility]) 2013    Diabetic eye exam ([address] 2016    Dilated cardiomyopathy ([address]     Dyslipidemia     Gout     Heart attack ([address] 10/18/2019    Stented    History of echocardiogram 11/14/2014    Mild LVE. EF 40%. Mild, diffuse hypk. Mild LAE. Mild MR. Hypercholesteremia 01/08/2014    Hypertension 2000    Noncompliance with medications 02/16/2016    Obesity     Orthostatic hypotension 05/17/2016    Osteoarthritis of both knees     Pain management 04/01/2016    Dr. Jaleesa Polk     Popliteal cyst, bilateral      Sleep apnea     not using cpap    Stage 3b chronic kidney disease ([facility] 08/08/2022    Vitamin D deficiency 10/16/2014       Past Surgical History:   Procedure Laterality Date    HX HEART CATHETERIZATION  2019    Coronary stent    HX TUBAL LIGATION      IR INSERT TUNL CVC W/O PORT OVER 5 YR  09/21/2022       Current Discharge Medication List        CONTINUE these medications which have NOT CHANGED    Details   albuterol (PROVENTIL HFA, VENTOLIN HFA, PROAIR HFA) 90 mcg/actuation inhaler       atorvastatin (LIPITOR) 80 mg tablet Take 80 mg by mouth nightly. OneTouch Ultra Test strip USE TO TEST BLOOD SUGAR TWICE DAILY      ezetimibe (ZETIA) 10 mg tablet Take 10 mg by mouth daily. hydrALAZINE (APRESOLINE) 100 mg tablet Take 100 mg by mouth two (2) times a day. tiZANidine (ZANAFLEX) 4 mg tablet Take 4 mg by mouth two (2) times daily as needed. metoprolol tartrate (LOPRESSOR) 25 mg tablet Take 1 Tablet by mouth two (2) times a day. Qty: 60 Tablet, Refills: 0      insulin detemir U-100 (Levemir U-100 Insulin) 100 unit/mL injection 15 units daily for diabetes once daily with lunch  Qty: 2 mL, Refills: 0    Associated Diagnoses: Diabetes mellitus type 2, insulin dependent (HCC)      gabapentin (NEURONTIN) 400 mg capsule Take 400 mg by mouth in the morning. albuterol-ipratropium (DUO-NEB) 2.5 mg-0.5 mg/3 ml nebu 3 mL by Nebulization route every six (6) hours as needed for Wheezing. Qty: 30 Nebule, Refills: 0      aspirin 81 mg chewable tablet Take 2 Tabs by mouth daily.   Qty: 60 Tab, Refills: 0    Associated Diagnoses: Coronary artery disease involving native coronary artery of native heart without angina pectoris umeclidinium-vilanterol (ANORO ELLIPTA) 62.5-25 mcg/actuation inhaler Take 1 Puff by inhalation daily. For COPD  Qty: 3 Inhaler, Refills: 3    Comments: 9/22/15 Dr Kellen Hassan prescribed.  Will order via [facility] if available  Associated Diagnoses: Chronic obstructive pulmonary disease, unspecified COPD type ([facility]             Current Facility-Administered Medications   Medication Dose Route Frequency    GENTAMICIN INFORMATION NOTE   Other Rx Dosing/Monitoring    docusate sodium (COLACE) capsule 100 mg  100 mg Oral BID    heparin (porcine) 1,000 unit/mL injection 3,800 Units  3,800 Units Hemodialysis DIALYSIS TUE, THU & SAT    tiotropium-olodateroL (STIOLTO RESPIMAT) 2.5-2.5 mcg/actuation inhaler 2 Puff  2 Puff Inhalation DAILY    albuterol (ACCUNEB) nebulizer solution 1.25 mg  1.25 mg Nebulization Q6H PRN    oxyCODONE-acetaminophen (PERCOCET) 5-325 mg per tablet 1 Tablet  1 Tablet Oral Q4H PRN    traMADoL (ULTRAM) tablet 50 mg  50 mg Oral Q6H PRN    epoetin tonny-epbx (RETACRIT) injection 4,000 Units  4,000 Units SubCUTAneous Q TUE, THU & SAT    naloxone (NARCAN) injection 0.4 mg  0.4 mg IntraVENous PRN    pantoprazole (PROTONIX) 40 mg in 0.9% sodium chloride 10 mL injection  40 mg IntraVENous Q12H    piperacillin-tazobactam (ZOSYN) 3.375 g in 0.9% sodium chloride (MBP/ADV) 100 mL MBP  3.375 g IntraVENous Q12H    HYDROmorphone (DILAUDID) injection 1 mg  1 mg IntraVENous Q3H PRN    sodium chloride (OCEAN) 0.65 % nasal squeeze bottle 2 Spray  2 Spray Both Nostrils Q2H PRN    [Held by provider] melatonin tablet 5 mg  5 mg Oral QHS    heparin (porcine) injection 5,000 Units  5,000 Units SubCUTAneous Q8H    aspirin chewable tablet 162 mg  162 mg Oral DAILY    atorvastatin (LIPITOR) tablet 80 mg  80 mg Oral QHS    ezetimibe (ZETIA) tablet 10 mg  10 mg Oral DAILY    [Held by provider] gabapentin (NEURONTIN) capsule 400 mg  400 mg Oral DAILY    sodium chloride (NS) flush 5-40 mL  5-40 mL IntraVENous Q8H    sodium chloride (NS) flush 5-40 mL  5-40 mL IntraVENous PRN    acetaminophen (TYLENOL) tablet 650 mg  650 mg Oral Q6H PRN    Or    acetaminophen (TYLENOL) suppository 650 mg  650 mg Rectal Q6H PRN    polyethylene glycol (MIRALAX) packet 17 g  17 g Oral DAILY PRN    bisacodyL (DULCOLAX) suppository 10 mg  10 mg Rectal DAILY PRN    ondansetron (ZOFRAN ODT) tablet 4 mg  4 mg Oral Q8H PRN    Or    ondansetron (ZOFRAN) injection 4 mg  4 mg IntraVENous Q6H PRN    metoprolol tartrate (LOPRESSOR) tablet 25 mg  25 mg Oral BID    insulin lispro (HUMALOG) injection   SubCUTAneous AC&HS    glucose chewable tablet 16 g  4 Tablet Oral PRN    glucagon (GLUCAGEN) injection 1 mg  1 mg IntraMUSCular PRN    dextrose 10% infusion 0-250 mL  0-250 mL IntraVENous PRN    [Held by provider] insulin glargine (LANTUS) injection 5 Units  5 Units SubCUTAneous QHS    guaiFENesin ER (MUCINEX) tablet 600 mg  600 mg Oral Q12H       Allergies: Patient has no known allergies.     Family History   Problem Relation Age of Onset    Diabetes Mother     Hypertension Mother     Hypertension Father     Kidney Disease Maternal Aunt 48        Dialysis     Social History     Socioeconomic History    Marital status:      Spouse name: Not on file    Number of children: 3    Years of education: Not on file    Highest education level: Not on file   Occupational History    Occupation: retired   Tobacco Use    Smoking status: Former     Packs/day: 1.00     Years: 52.00     Pack years: 52.00     Types: Cigarettes     Quit date: 5/20/2019     Years since quitting: 3.6    Smokeless tobacco: Never   Vaping Use    Vaping Use: Never used   Substance and Sexual Activity    Alcohol use: No     Alcohol/week: 0.0 standard drinks    Drug use: Never    Sexual activity: Yes     Partners: Male   Other Topics Concern     Service No    Blood Transfusions No    Caffeine Concern No    Occupational Exposure No    Hobby Hazards No    Sleep Concern No    Stress Concern No    Weight Concern No Special Diet No    Back Care No    Exercise No    Bike Helmet No    Seat Belt Yes    Self-Exams Yes   Social History Narrative    Not on file     Social Determinants of Health     Financial Resource Strain: Not on file   Food Insecurity: Not on file   Transportation Needs: Not on file   Physical Activity: Not on file   Stress: Not on file   Social Connections: Not on file   Intimate Partner Violence: Not on file   Housing Stability: Not on file     Social History     Tobacco Use   Smoking Status Former    Packs/day: 1.00    Years: 52.00    Pack years: 52.00    Types: Cigarettes    Quit date: 2019    Years since quitting: 3.6   Smokeless Tobacco Never        Temp (24hrs), Av.6 °F (36.4 °C), Min:97 °F (36.1 °C), Max:98.3 °F (36.8 °C)    Visit Vitals  BP (!) 165/71 (BP 1 Location: Left upper arm, BP Patient Position: Semi fowlers; Lying)   Pulse 75   Temp 98.3 °F (36.8 °C)   Resp 18   Ht 5' 4\" (1.626 m)   Wt 96.4 kg (212 lb 8.4 oz)   SpO2 96%   Breastfeeding Unknown   BMI 36.48 kg/m²       ROS: 12 point ROS obtained in details.  Pertinent positives as mentioned in HPI,   otherwise negative    Physical Exam:       General appearance - alert, well appearing, and in no distress, oriented to person, place, and time   Mental status - alert, oriented to person, place, and time  Eyes - pupils equal and reactive, extraocular eye movements intact  Ears - bilateral TM's and external ear canals normal  Nose - normal and patent, no erythema, discharge or polyps  Mouth - mucous membranes moist, pharynx normal without lesions  Neck - supple, no significant adenopathy  Chest - clear to auscultation, no wheezes, rales or rhonchi, symmetric air entry  Heart - normal rate, regular rhythm, normal S1, S2, no rubs, clicks or gallops  Abdomen - soft, tenderness around the surgical site, colostomy in place  Breasts -rash underneath both breast  Neurological - alert, oriented, normal speech, no focal findings or movement disorder noted  Musculoskeletal - no joint tenderness, deformity or swelling  Extremities - peripheral pulses normal, no pedal edema, no clubbing or cyanosis  Skin - rash on extremities, trunk almost completely resolved     Labs: Results:   Chemistry Recent Labs     01/25/23  0250 01/24/23 0246 01/23/23 0247   * 114* 122*    133* 132*   K 3.6 4.1 3.9    101 99*   CO2 28 26 26   BUN 40* 53* 53*   CREA 2.14* 2.41* 2.50*   CA 8.9 9.3 8.8   AGAP 6 6 7   BUCR 19 22* 21*        CBC w/Diff Recent Labs     01/25/23  0250 01/24/23 0246 01/23/23 0247   WBC 11.9 11.3 10.4   RBC 3.27* 3.37* 3.01*   HGB 9.2* 9.5* 8.4*   HCT 29.7* 30.8* 26.8*    423* 341   GRANS 81* 65 78*   LYMPH 9* 25 16*   EOS 0 0 0        Microbiology No results for input(s): CULT in the last 72 hours. RADIOLOGY:    All available imaging studies/reports in [facility] for this admission were reviewed          Disclaimer: Sections of this note are dictated utilizing voice recognition software, which may have resulted in some phonetic based errors in grammar and contents. Even though attempts were made to correct all the mistakes, some may have been missed, and remained in the body of the document. If questions arise, please contact our department.     Dr. Yinka Cabello, Infectious Disease Specialist  [phone]  January 25, 2023  11:52 AM

## 2023-01-25 NOTE — WOUND CARE
Physical Exam  Room 458: continued colostomy teaching  Pt's  is at bedside. Went over supplies with pt & : [employer] catalog provided with items tabbed for easy ordering. Instructed pt to order her supplies tomorrow. Went over purpose of each item: wafers, pouches, deodorant, barrier rings, & skin prep wipes. Instructed pt to call me tomorrow for follow up appt with me as outpatient for 1 week for stoma care. Discussed with primary nurse Tony Trevizo RN & home care liaison Suresh Carlin. Will have [employer] send starter kit to pt's home address. Will turn over care to nursing staff at this time.   Elijah LOERAN, RN, [facility], [address]

## 2023-01-25 NOTE — PROGRESS NOTES
Attempted to give patient IV dilaudid. Unable to give dose due to IV leaking. This nurse to waste IV Dilaudid with 2nd RN.

## 2023-01-26 ENCOUNTER — HOME CARE VISIT (OUTPATIENT)
Dept: HOME HEALTH SERVICES | Facility: HOME HEALTH | Age: 73
End: 2023-01-26
Payer: MEDICARE

## 2023-01-27 ENCOUNTER — HOME CARE VISIT (OUTPATIENT)
Dept: HOME HEALTH SERVICES | Facility: HOME HEALTH | Age: 73
End: 2023-01-27
Payer: MEDICARE

## 2023-01-28 ENCOUNTER — HOME CARE VISIT (OUTPATIENT)
Dept: HOME HEALTH SERVICES | Facility: HOME HEALTH | Age: 73
End: 2023-01-28
Payer: MEDICARE

## 2023-01-29 ENCOUNTER — HOME CARE VISIT (OUTPATIENT)
Dept: SCHEDULING | Facility: HOME HEALTH | Age: 73
End: 2023-01-29
Payer: MEDICARE

## 2023-01-29 PROCEDURE — G0299 HHS/HOSPICE OF RN EA 15 MIN: HCPCS

## 2023-01-29 NOTE — Clinical Note
Syd Tuttle admitted to [facility] skilled nursing S/P Sigmoid Colectomy and Descending Colostomy for post op incision monitoring and ostomy teaching/care, anticipated SN frequency 2wk6, 2 prn.  Thank you for referral !    Respectfully,  Nasir Drummond, RN

## 2023-01-30 ENCOUNTER — HOME CARE VISIT (OUTPATIENT)
Dept: HOME HEALTH SERVICES | Facility: HOME HEALTH | Age: 73
End: 2023-01-30
Payer: MEDICARE

## 2023-02-01 ENCOUNTER — HOME CARE VISIT (OUTPATIENT)
Dept: HOME HEALTH SERVICES | Facility: HOME HEALTH | Age: 73
End: 2023-02-01
Payer: MEDICARE

## 2023-02-01 ENCOUNTER — HOME CARE VISIT (OUTPATIENT)
Dept: SCHEDULING | Facility: HOME HEALTH | Age: 73
End: 2023-02-01
Payer: MEDICARE

## 2023-02-01 ENCOUNTER — DOCUMENTATION ONLY (OUTPATIENT)
Dept: PULMONOLOGY | Age: 73
End: 2023-02-01

## 2023-02-01 VITALS
SYSTOLIC BLOOD PRESSURE: 134 MMHG | BODY MASS INDEX: 32.96 KG/M2 | DIASTOLIC BLOOD PRESSURE: 78 MMHG | TEMPERATURE: 98.3 F | OXYGEN SATURATION: 96 % | WEIGHT: 192 LBS | HEART RATE: 86 BPM | RESPIRATION RATE: 18 BRPM

## 2023-02-01 PROCEDURE — G0299 HHS/HOSPICE OF RN EA 15 MIN: HCPCS

## 2023-02-01 NOTE — Clinical Note
Unable to obtain f/u labs on patient (CBC, CMP, Mg), only one limb able to be used, unsuccessful attempts x 2

## 2023-02-01 NOTE — HOME HEALTH
Skilled services/Home bound verification: Dx:  S/P Sigmoid Colectomy and Descending Colostomy     Skilled Reason for admission/summary of clinical condition:  disease med management and ostomy care  . This patient is homebound for the following reasons Requires considerable and taxing effort to leave the home  and Requires the assistance of 1 or more persons to leave the home . Discussed with patient and caregiver(s) availability and willingness to provide care. Primary caregiver is SPOUSE available 24/7 and is able to assist with meal prep and setup, fill med box, perform wound care, grocery shopping, household chores and transportation to MD appointment      Medications reconciled and all medications are available in the home this visit. Medications  are effective with pain control at this time. High risk medication teaching regarding anticoagulants, hyperglycemic agents or opiod narcotics performed (specify) High risk medication teaching regarding anticoagulants, hyperglycemic agents or opiod narcotics performed (specify) Controlled substance OXYCODONE  High risk for addiction and dependence. Can cause respiratory distress and death when taken in high doses or when combined with other substances, especially alcohol or other illicit drugs such as heroin or cocaine. MD notified of any discrepancies/look a like medications/medication interactions NA. Home health supplies by type and quantity ordered/delivered this visit include: ostomy supplies     Patient education provided this visit to include: Skilled nurse teaching how patient performed colostomy care , washed the stoma itself and the skin around the stoma with soft paper towels, mild soap and water. Measured the stoma, cut out the opening, removed the paper back and set it aside. Finally hold the punch with the sticky side toward your body. Center the opening on the stoma , then press firmly abdomen for 30 seconds. Anne Ramirez Patient level of understanding of education provided: pt verbalized understanding of education provided this visit      [employer] Education Provided: Containers should be made of hard plastic, be puncture-resistant and leakproof,   such as a laundry detergent or bleach bottle. When the container is ¾ full, it should be sealed with tape and labeled   DO NOT RECYCLE prior to discarding in the regular trash. Patient response to procedure performed:  pt tolerated without complaints of pain      Home exercise program/Homework provided: pt instructed to follow a high protein diet for healing- to try to get 90g protein daily. Pt/Caregiver instructed on plan of care and are agreeable to plan of care at this time. Physician Arelis Ramirez MD  notified of patient admission to home health and plan of care including anticipated frequency of 2wk6 2prn and treatments/interventions/modalities of pt/ot. Discharge planning discussed with patient and caregiver. Discharge planning as follows: Patient will be discharged once education has completed, patient is medically stable and pt is able to independently manage incision care/has healed or no longer requires skilled care. Arpit Azul Pt/Caregiver did verbalize understanding of discharge planning. Next MD appointment with PCP RENARD. Patient/caregiver encouraged/instructed to keep appointment as lack of follow through with physician appointment could result in discontinuation of home care services for non-compliance.

## 2023-02-01 NOTE — HOME HEALTH
Skilled reason for visit: ostomy teaching, disease and medication management. unable to obtain labs, unsuccessful x 2 attempts, only able to use LUE. MD made aware, Discuss with patient to attend follow up appointments that are scheduled next week, verbalized understanding. Caregiver involvement: Friend assist with ADLs. Medications reviewed and all medications are available in the home this visit. The following education was provided regarding medications: Instructed patient in medication Metoprolol is used to treat high blood pressure. It also is used to prevent angina (chest pain) and to improve survival after a heart attack. If your doctor prescribes a low-salt or low-sodium diet, follow these directions carefully. Metoprolol may cause side effects. Tell your doctor if any of these symptoms are severe or do not go away: dizziness or lightheartedness, tiredness, depression, nausea and dry mouth. .    MD notified of any discrepancies/look a-like medications/medication interactions: none  Medications are effective at this time. Home health supplies by type and quantity ordered/delivered this visit include: ostomy supplies    Patient education provided this visit:SN instructed patient instructed patient caregiver how to do Ostomy Care, as follow: Preparing, Applying, and Removing an Ostomy System to make the process easier and more effective, here are several easy steps you and your patients can follow when applying and removing an ostomy system: Remove Use an Adhesive Remover Wipe, Clean Clean & Dry Clean peristomal skin with non-moisturizing or non-oily soap, rinse well with clean water and pat dry, Measure Measure Stoma Place the stoma measuring guide over the stoma, measuring the stoma at the base., Protect use an Adhesive Remover Wipe, Apply New Pouching System/Skin Barrier. Make sure to have a good seal around the stoma.     Sharps education provided: n/a    Patient level of understanding of education provided: patient verbalized understanding    Skilled Care Performed this visit: ostomy care, incision assessment, disease and medication management    Patient response to procedure performed:  patient tolerated w/o any increased level of pain    Agency Progress toward goals: progressing    Patient's Progress towards personal goals: needs improvement    Home exercise program: take all medications as prescribed, monitor for s/s of infection    Continued need for the following skills: Nursing    Plan for next visit: ostomy teaching    Patient and/or caregiver notified and agrees to changes in the Plan of Care YES/NO/NA: YES      The following discharge planning was discussed with the pt/caregiver: Discharge when goals are met, incision is healed,patient/caregiver able to manage disease process, medications, and pain

## 2023-02-02 ENCOUNTER — HOME CARE VISIT (OUTPATIENT)
Dept: HOME HEALTH SERVICES | Facility: HOME HEALTH | Age: 73
End: 2023-02-02
Payer: MEDICARE

## 2023-02-03 ENCOUNTER — APPOINTMENT (OUTPATIENT)
Dept: CT IMAGING | Age: 73
End: 2023-02-03
Attending: EMERGENCY MEDICINE
Payer: MEDICARE

## 2023-02-03 ENCOUNTER — HOME CARE VISIT (OUTPATIENT)
Dept: HOME HEALTH SERVICES | Facility: HOME HEALTH | Age: 73
End: 2023-02-03
Payer: MEDICARE

## 2023-02-03 ENCOUNTER — APPOINTMENT (OUTPATIENT)
Dept: GENERAL RADIOLOGY | Age: 73
End: 2023-02-03
Attending: STUDENT IN AN ORGANIZED HEALTH CARE EDUCATION/TRAINING PROGRAM
Payer: MEDICARE

## 2023-02-03 ENCOUNTER — APPOINTMENT (OUTPATIENT)
Dept: GENERAL RADIOLOGY | Age: 73
End: 2023-02-03
Attending: EMERGENCY MEDICINE
Payer: MEDICARE

## 2023-02-03 ENCOUNTER — HOSPITAL ENCOUNTER (EMERGENCY)
Dept: NUCLEAR MEDICINE | Age: 73
End: 2023-02-03
Attending: EMERGENCY MEDICINE
Payer: MEDICARE

## 2023-02-03 ENCOUNTER — HOSPITAL ENCOUNTER (EMERGENCY)
Age: 73
Discharge: HOSPICE | End: 2023-02-04
Attending: STUDENT IN AN ORGANIZED HEALTH CARE EDUCATION/TRAINING PROGRAM
Payer: MEDICARE

## 2023-02-03 ENCOUNTER — APPOINTMENT (OUTPATIENT)
Dept: VASCULAR SURGERY | Age: 73
End: 2023-02-03
Attending: EMERGENCY MEDICINE
Payer: MEDICARE

## 2023-02-03 DIAGNOSIS — N18.6 ESRD (END STAGE RENAL DISEASE) ON DIALYSIS (HCC): ICD-10-CM

## 2023-02-03 DIAGNOSIS — J44.1 ACUTE EXACERBATION OF CHRONIC OBSTRUCTIVE PULMONARY DISEASE (COPD) (HCC): Primary | ICD-10-CM

## 2023-02-03 DIAGNOSIS — Z99.2 ENCOUNTER FOR DIALYSIS (HCC): ICD-10-CM

## 2023-02-03 DIAGNOSIS — Z99.2 ESRD (END STAGE RENAL DISEASE) ON DIALYSIS (HCC): ICD-10-CM

## 2023-02-03 LAB
ANION GAP SERPL CALC-SCNC: 8 MMOL/L (ref 3–18)
BASOPHILS # BLD: 0 K/UL (ref 0–0.1)
BASOPHILS NFR BLD: 0 % (ref 0–2)
BUN SERPL-MCNC: 14 MG/DL (ref 7–18)
BUN/CREAT SERPL: 7 (ref 12–20)
CALCIUM SERPL-MCNC: 9.4 MG/DL (ref 8.5–10.1)
CHLORIDE SERPL-SCNC: 101 MMOL/L (ref 100–111)
CO2 SERPL-SCNC: 28 MMOL/L (ref 21–32)
CREAT SERPL-MCNC: 2.08 MG/DL (ref 0.6–1.3)
DIFFERENTIAL METHOD BLD: ABNORMAL
EOSINOPHIL # BLD: 0.1 K/UL (ref 0–0.4)
EOSINOPHIL NFR BLD: 2 % (ref 0–5)
ERYTHROCYTE [DISTWIDTH] IN BLOOD BY AUTOMATED COUNT: 18.8 % (ref 11.6–14.5)
GLUCOSE SERPL-MCNC: 115 MG/DL (ref 74–99)
HCT VFR BLD AUTO: 29.4 % (ref 35–45)
HGB BLD-MCNC: 9.1 G/DL (ref 12–16)
IMM GRANULOCYTES # BLD AUTO: 0 K/UL (ref 0–0.04)
IMM GRANULOCYTES NFR BLD AUTO: 0 % (ref 0–0.5)
LYMPHOCYTES # BLD: 1.5 K/UL (ref 0.9–3.6)
LYMPHOCYTES NFR BLD: 25 % (ref 21–52)
MCH RBC QN AUTO: 28.5 PG (ref 24–34)
MCHC RBC AUTO-ENTMCNC: 31 G/DL (ref 31–37)
MCV RBC AUTO: 92.2 FL (ref 78–100)
MONOCYTES # BLD: 0.6 K/UL (ref 0.05–1.2)
MONOCYTES NFR BLD: 10 % (ref 3–10)
NEUTS SEG # BLD: 3.7 K/UL (ref 1.8–8)
NEUTS SEG NFR BLD: 63 % (ref 40–73)
NRBC # BLD: 0 K/UL (ref 0–0.01)
NRBC BLD-RTO: 0 PER 100 WBC
PLATELET # BLD AUTO: 372 K/UL (ref 135–420)
PMV BLD AUTO: 9.2 FL (ref 9.2–11.8)
POTASSIUM SERPL-SCNC: 3.6 MMOL/L (ref 3.5–5.5)
RBC # BLD AUTO: 3.19 M/UL (ref 4.2–5.3)
SODIUM SERPL-SCNC: 137 MMOL/L (ref 136–145)
TROPONIN I SERPL HS-MCNC: 37 NG/L (ref 0–54)
WBC # BLD AUTO: 5.8 K/UL (ref 4.6–13.2)

## 2023-02-03 PROCEDURE — 94640 AIRWAY INHALATION TREATMENT: CPT

## 2023-02-03 PROCEDURE — 99285 EMERGENCY DEPT VISIT HI MDM: CPT

## 2023-02-03 PROCEDURE — 74011250636 HC RX REV CODE- 250/636: Performed by: EMERGENCY MEDICINE

## 2023-02-03 PROCEDURE — 93005 ELECTROCARDIOGRAM TRACING: CPT

## 2023-02-03 PROCEDURE — 84484 ASSAY OF TROPONIN QUANT: CPT

## 2023-02-03 PROCEDURE — 96374 THER/PROPH/DIAG INJ IV PUSH: CPT

## 2023-02-03 PROCEDURE — 93970 EXTREMITY STUDY: CPT

## 2023-02-03 PROCEDURE — 74011250637 HC RX REV CODE- 250/637: Performed by: EMERGENCY MEDICINE

## 2023-02-03 PROCEDURE — 78582 LUNG VENTILAT&PERFUS IMAGING: CPT

## 2023-02-03 PROCEDURE — 71045 X-RAY EXAM CHEST 1 VIEW: CPT

## 2023-02-03 PROCEDURE — 85025 COMPLETE CBC W/AUTO DIFF WBC: CPT

## 2023-02-03 PROCEDURE — 80048 BASIC METABOLIC PNL TOTAL CA: CPT

## 2023-02-03 PROCEDURE — 74011000250 HC RX REV CODE- 250: Performed by: EMERGENCY MEDICINE

## 2023-02-03 RX ORDER — GUAIFENESIN 600 MG/1
600 TABLET, EXTENDED RELEASE ORAL 2 TIMES DAILY
Qty: 20 TABLET | Refills: 0 | Status: SHIPPED | OUTPATIENT
Start: 2023-02-03

## 2023-02-03 RX ORDER — DOXYCYCLINE HYCLATE 100 MG
100 TABLET ORAL 2 TIMES DAILY
Qty: 20 TABLET | Refills: 0 | Status: SHIPPED | OUTPATIENT
Start: 2023-02-03 | End: 2023-02-13

## 2023-02-03 RX ORDER — GUAIFENESIN 100 MG/5ML
100 SOLUTION ORAL
Status: COMPLETED | OUTPATIENT
Start: 2023-02-03 | End: 2023-02-03

## 2023-02-03 RX ORDER — ALBUTEROL SULFATE 90 UG/1
2 AEROSOL, METERED RESPIRATORY (INHALATION)
Qty: 18 G | Refills: 0 | Status: SHIPPED | OUTPATIENT
Start: 2023-02-03

## 2023-02-03 RX ORDER — IPRATROPIUM BROMIDE AND ALBUTEROL SULFATE 2.5; .5 MG/3ML; MG/3ML
3 SOLUTION RESPIRATORY (INHALATION) ONCE
Status: DISCONTINUED | OUTPATIENT
Start: 2023-02-03 | End: 2023-02-03

## 2023-02-03 RX ORDER — ALBUTEROL SULFATE 0.83 MG/ML
5 SOLUTION RESPIRATORY (INHALATION)
Status: COMPLETED | OUTPATIENT
Start: 2023-02-03 | End: 2023-02-03

## 2023-02-03 RX ORDER — DOXYCYCLINE 100 MG/1
100 CAPSULE ORAL EVERY 12 HOURS
Status: DISCONTINUED | OUTPATIENT
Start: 2023-02-03 | End: 2023-02-04 | Stop reason: HOSPADM

## 2023-02-03 RX ORDER — DEXAMETHASONE SODIUM PHOSPHATE 4 MG/ML
10 INJECTION, SOLUTION INTRA-ARTICULAR; INTRALESIONAL; INTRAMUSCULAR; INTRAVENOUS; SOFT TISSUE ONCE
Status: COMPLETED | OUTPATIENT
Start: 2023-02-03 | End: 2023-02-03

## 2023-02-03 RX ADMIN — GUAIFENESIN 100 MG: 200 SOLUTION ORAL at 16:10

## 2023-02-03 RX ADMIN — DOXYCYCLINE HYCLATE 100 MG: 100 CAPSULE ORAL at 22:43

## 2023-02-03 RX ADMIN — IPRATROPIUM BROMIDE 1 DOSE: 0.5 SOLUTION RESPIRATORY (INHALATION) at 16:11

## 2023-02-03 RX ADMIN — DEXAMETHASONE SODIUM PHOSPHATE 10 MG: 4 INJECTION, SOLUTION INTRAMUSCULAR; INTRAVENOUS at 16:10

## 2023-02-03 RX ADMIN — ALBUTEROL SULFATE 5 MG: 2.5 SOLUTION RESPIRATORY (INHALATION) at 16:11

## 2023-02-03 NOTE — ED TRIAGE NOTES
Pt arrived with c/o of SOB that started today. Pt receives dialysis T, Th, and Sat. Pt last went to dialysis on Tuesday and sat for a full session.

## 2023-02-03 NOTE — ED PROVIDER NOTES
EMERGENCY DEPARTMENT HISTORY AND PHYSICAL EXAM    Date: 2/3/2023  Patient Name: Mohit Panchal    History of Presenting Illness     Chief Complaint   Patient presents with    Shortness of Breath         History Provided By: Patient    Additional History (Context): Mohit Panchal is a 81 y.o. female with diabetes, hypertension, hyperlipidemia, myocardial infarction, COPD, and ESRD on HD  who presents with complaint of shortness of breath and a dry cough since last night. The cough is really minimal but she is fatigued feeling very short of breath not improved with her home inhalers for her COPD. Had emergency colostomy about 2 weeks ago and is to see her surgeon next week. Dialysis is Tuesday Thursday Saturday and she attended the session folate yesterday. Denies fever. Denies nausea vomiting or diarrhea or abdominal pain. Denies chest pain. PCP: Regulo Cárdenas MD    Current Facility-Administered Medications   Medication Dose Route Frequency Provider Last Rate Last Admin    iopamidoL (ISOVUE-370) 370 mg iodine /mL (76 %) injection  mL   mL IntraVENous RAD ONCE Adore, Margy COLE MD         Current Outpatient Medications   Medication Sig Dispense Refill    doxycycline (VIBRA-TABS) 100 mg tablet Take 1 Tablet by mouth two (2) times a day for 10 days. 20 Tablet 0    albuterol (PROVENTIL HFA, VENTOLIN HFA, PROAIR HFA) 90 mcg/actuation inhaler Take 2 Puffs by inhalation every six (6) hours as needed for Wheezing. 18 g 0    guaiFENesin ER (Mucinex) 600 mg ER tablet Take 1 Tablet by mouth two (2) times a day. 20 Tablet 0    docusate sodium (COLACE) 100 mg capsule Take 1 Capsule by mouth two (2) times a day. 60 Capsule 0    polyethylene glycol (MIRALAX) 17 gram packet Take 1 Packet by mouth daily as needed for Constipation. 15 Each 0    pantoprazole (Protonix) 40 mg tablet Take 1 Tablet by mouth daily. 30 Tablet 0    OTHER Gentamicin to be given with hemodialysis through January 31, 2023.   Nephrology to arrange 1 Each 0    OTHER Incentive spirometry-use as directed 1 Each 0    OTHER Graded compression stockings-bilateral lower extremities-use as directed 1 Each 0    atorvastatin (LIPITOR) 80 mg tablet Take 80 mg by mouth nightly. OneTouch Ultra Test strip USE TO TEST BLOOD SUGAR TWICE DAILY      ezetimibe (ZETIA) 10 mg tablet Take 10 mg by mouth daily. metoprolol tartrate (LOPRESSOR) 25 mg tablet Take 1 Tablet by mouth two (2) times a day. 60 Tablet 0    gabapentin (NEURONTIN) 400 mg capsule Take 400 mg by mouth in the morning. albuterol-ipratropium (DUO-NEB) 2.5 mg-0.5 mg/3 ml nebu 3 mL by Nebulization route every six (6) hours as needed for Wheezing. 30 Nebule 0    aspirin 81 mg chewable tablet Take 2 Tabs by mouth daily. 60 Tab 0    umeclidinium-vilanterol (ANORO ELLIPTA) 62.5-25 mcg/actuation inhaler Take 1 Puff by inhalation daily. For COPD 3 Inhaler 3       Past History     Past Medical History:  Past Medical History:   Diagnosis Date    Abnormal WBC count 05/17/2016    Anemia     Bilateral shoulder pain 09/27/2016    Chondromalacia of both patellae     Chronic heart failure with preserved ejection fraction ([facility] [address] 1/13/2023    COPD (chronic obstructive pulmonary disease) ([facility] [address] 09/2015    mild-mod dz; Dr Louis Bazzi    Diabetes [facility]) 2013    Diabetic eye exam ([facility] [address] 2016    Dilated cardiomyopathy ([facility] [address]     Dyslipidemia     Gout     Heart attack ([facility] [address] 10/18/2019    Stented    History of echocardiogram 11/14/2014    Mild LVE. EF 40%. Mild, diffuse hypk. Mild LAE. Mild MR.       Hypercholesteremia 01/08/2014    Hypertension 2000    Noncompliance with medications 02/16/2016    Obesity     Orthostatic hypotension 05/17/2016    Osteoarthritis of both knees     Pain management 04/01/2016    Dr. Aram Aguilar     Popliteal cyst, bilateral      Sleep apnea     not using cpap    Stage 3b chronic kidney disease ([facility] [address] 08/08/2022    Vitamin D deficiency 10/16/2014       Past Surgical History:  Past Surgical History:   Procedure Laterality Date    HX HEART CATHETERIZATION  2019    Coronary stent    HX OTHER SURGICAL  01/18/2023    Exploratory laparotomy, sigmoid colectomy, and descending colostomy    HX TUBAL LIGATION      IR INSERT TUNL CVC W/O PORT OVER 5 YR  09/21/2022       Family History:  Family History   Problem Relation Age of Onset    Diabetes Mother     Hypertension Mother     Hypertension Father     Kidney Disease Maternal Aunt 48        Dialysis       Social History:  Social History     Tobacco Use    Smoking status: Former     Packs/day: 1.00     Years: 52.00     Pack years: 52.00     Types: Cigarettes     Quit date: 5/20/2019     Years since quitting: 3.7    Smokeless tobacco: Never   Vaping Use    Vaping Use: Never used   Substance Use Topics    Alcohol use: No     Alcohol/week: 0.0 standard drinks    Drug use: Never       Allergies:  No Known Allergies      Review of Systems   Review of Systems   Constitutional:  Positive for fatigue. Negative for fever. HENT: Negative. Eyes: Negative. Respiratory:  Positive for cough, shortness of breath and wheezing. Cardiovascular:  Negative for chest pain. Gastrointestinal: Negative. Endocrine: Negative. Genitourinary: Negative. Musculoskeletal: Negative. Skin: Negative. Allergic/Immunologic: Negative. Neurological: Negative. Hematological: Negative. Psychiatric/Behavioral: Negative. All Other Systems Negative  Physical Exam     Vitals:    02/03/23 1512   BP: (!) 162/80   Pulse: (!) 112   Resp: 22   Temp: 98.4 °F (36.9 °C)   SpO2: 99%     Physical Exam  Vitals and nursing note reviewed. Constitutional:       Appearance: She is well-developed. HENT:      Head: Normocephalic and atraumatic. Right Ear: External ear normal.      Left Ear: External ear normal.      Nose: Nose normal.   Eyes:      Conjunctiva/sclera: Conjunctivae normal.      Pupils: Pupils are equal, round, and reactive to light.    Neck:      Vascular: No JVD.      Trachea: No tracheal deviation. Cardiovascular:      Rate and Rhythm: Normal rate and regular rhythm. Heart sounds: Normal heart sounds. No murmur heard. No friction rub. No gallop. Pulmonary:      Effort: Pulmonary effort is normal. No respiratory distress. Breath sounds: Examination of the right-upper field reveals wheezing. Examination of the left-upper field reveals wheezing. Examination of the right-middle field reveals wheezing. Examination of the left-middle field reveals wheezing. Examination of the right-lower field reveals wheezing. Examination of the left-lower field reveals wheezing. Wheezing present. No rales. Comments: Expiratory wheezing throughout  Abdominal:      General: Bowel sounds are normal. There is no distension. Palpations: Abdomen is soft. There is no mass. Tenderness: There is no abdominal tenderness. There is no guarding or rebound. Musculoskeletal:         General: No tenderness. Normal range of motion. Cervical back: Normal range of motion and neck supple. Skin:     General: Skin is warm and dry. Findings: No rash. Neurological:      Mental Status: She is alert and oriented to person, place, and time. Cranial Nerves: No cranial nerve deficit. Deep Tendon Reflexes: Reflexes are normal and symmetric.    Psychiatric:         Behavior: Behavior normal.          Diagnostic Study Results     Labs -     Recent Results (from the past 12 hour(s))   EKG, 12 LEAD, INITIAL    Collection Time: 02/03/23  3:25 PM   Result Value Ref Range    Ventricular Rate 110 BPM    Atrial Rate 110 BPM    P-R Interval 58 ms    QRS Duration 146 ms    Q-T Interval 432 ms    QTC Calculation (Bezet) 584 ms    Calculated R Axis -15 degrees    Calculated T Axis -42 degrees    Diagnosis       Sinus tachycardia with short [place]  Left ventricular hypertrophy with QRS widening ( R in aVL , [place] product )  Inferior infarct , age undetermined  Abnormal ECG  When compared with ECG of 13-JAN-2023 09:42,  CA interval has decreased  Questionable change in QRS duration  Inferior infarct is now present     CBC WITH AUTOMATED DIFF    Collection Time: 02/03/23  3:42 PM   Result Value Ref Range    WBC 5.8 4.6 - 13.2 K/uL    RBC 3.19 (L) 4.20 - 5.30 M/uL    HGB 9.1 (L) 12.0 - 16.0 g/dL    HCT 29.4 (L) 35.0 - 45.0 %    MCV 92.2 78.0 - 100.0 FL    MCH 28.5 24.0 - 34.0 PG    MCHC 31.0 31.0 - 37.0 g/dL    RDW 18.8 (H) 11.6 - 14.5 %    PLATELET 621 378 - 217 K/uL    MPV 9.2 9.2 - 11.8 FL    NRBC 0.0 0  WBC    ABSOLUTE NRBC 0.00 0.00 - 0.01 K/uL    NEUTROPHILS 63 40 - 73 %    LYMPHOCYTES 25 21 - 52 %    MONOCYTES 10 3 - 10 %    EOSINOPHILS 2 0 - 5 %    BASOPHILS 0 0 - 2 %    IMMATURE GRANULOCYTES 0 0.0 - 0.5 %    ABS. NEUTROPHILS 3.7 1.8 - 8.0 K/UL    ABS. LYMPHOCYTES 1.5 0.9 - 3.6 K/UL    ABS. MONOCYTES 0.6 0.05 - 1.2 K/UL    ABS. EOSINOPHILS 0.1 0.0 - 0.4 K/UL    ABS. BASOPHILS 0.0 0.0 - 0.1 K/UL    ABS. IMM. GRANS. 0.0 0.00 - 0.04 K/UL    DF AUTOMATED     METABOLIC PANEL, BASIC    Collection Time: 02/03/23  3:42 PM   Result Value Ref Range    Sodium 137 136 - 145 mmol/L    Potassium 3.6 3.5 - 5.5 mmol/L    Chloride 101 100 - 111 mmol/L    CO2 28 21 - 32 mmol/L    Anion gap 8 3.0 - 18 mmol/L    Glucose 115 (H) 74 - 99 mg/dL    BUN 14 7.0 - 18 MG/DL    Creatinine 2.08 (H) 0.6 - 1.3 MG/DL    BUN/Creatinine ratio 7 (L) 12 - 20      eGFR 25 (L) >60 ml/min/1.73m2    Calcium 9.4 8.5 - 10.1 MG/DL   TROPONIN-HIGH SENSITIVITY    Collection Time: 02/03/23  3:42 PM   Result Value Ref Range    Troponin-High Sensitivity 37 0 - 54 ng/L       Radiologic Studies -   CTA CHEST W OR W WO CONT         XR CHEST PORT   Final Result      Resolved central venous congestion.       NM LUNG PERFUSION W VENT    (Results Pending)   XR CHEST SNGL V    (Results Pending)   DUPLEX LOWER EXT VENOUS BILAT    (Results Pending)     CT Results  (Last 48 hours)      None          CXR Results  (Last 48 hours) 02/03/23 1522  XR CHEST PORT Final result    Impression:      Resolved central venous congestion. Narrative:  PORTABLE CHEST RADIOGRAPH        CPT CODE: 35015        INDICATION: Shortness of breath beginning today, dialysis. COMPARISON: 1/13/2023. FINDINGS:       Frontal view of the chest obtained at 1513 hours. Patient is less rotated. Right   IJ central venous catheter remains in place. Cardiomediastinal silhouette is   similar. Atherosclerosis at the arch. Resolved central venous congestion. Pulmonary vasculature are normal. No focal opacity, significant pleural effusion   or pneumothorax. Degenerative disc disease. Medical Decision Making   I am the first provider for this patient. I reviewed the vital signs, available nursing notes, past medical history, past surgical history, family history and social history. Vital Signs-Reviewed the patient's vital signs. Records Reviewed: Prior medical records and Nursing notes    Procedures:  Procedures    Provider Notes (Medical Decision Making): Patient complaining of shortness of breath wheezing and had recent surgery. She is high risk for a PE and she is tachycardic. Symptoms now for several days. Have sent prescriptions for doxycycline albuterol prednisone and Mucinex to pharmacy but patient needs to have advanced imaging to evaluate her chest.  CTA IV placed but infiltrated in CT suite. V/Q nuclear medicine tech is coming in from [facility]. Have ordered PVLs and have asked for vascular tech to arrive. 8:24 PM : Pt care transferred to  ,ED provider. History of patient complaint(s), available diagnostic reports and current treatment plan has been discussed thoroughly. Bedside rounding on patient occured : yes .   Intended disposition of patient : home  Pending diagnostics reports and/or labs (please list): VQ, PVL    MED RECONCILIATION:  Current Facility-Administered Medications   Medication Dose Route Frequency    iopamidoL (ISOVUE-370) 370 mg iodine /mL (76 %) injection  mL   mL IntraVENous RAD ONCE     Current Outpatient Medications   Medication Sig    doxycycline (VIBRA-TABS) 100 mg tablet Take 1 Tablet by mouth two (2) times a day for 10 days. albuterol (PROVENTIL HFA, VENTOLIN HFA, PROAIR HFA) 90 mcg/actuation inhaler Take 2 Puffs by inhalation every six (6) hours as needed for Wheezing. guaiFENesin ER (Mucinex) 600 mg ER tablet Take 1 Tablet by mouth two (2) times a day. docusate sodium (COLACE) 100 mg capsule Take 1 Capsule by mouth two (2) times a day. polyethylene glycol (MIRALAX) 17 gram packet Take 1 Packet by mouth daily as needed for Constipation. pantoprazole (Protonix) 40 mg tablet Take 1 Tablet by mouth daily. OTHER Gentamicin to be given with hemodialysis through January 31, 2023. Nephrology to arrange    OTHER Incentive spirometry-use as directed    OTHER Graded compression stockings-bilateral lower extremities-use as directed    atorvastatin (LIPITOR) 80 mg tablet Take 80 mg by mouth nightly. OneTouch Ultra Test strip USE TO TEST BLOOD SUGAR TWICE DAILY    ezetimibe (ZETIA) 10 mg tablet Take 10 mg by mouth daily. metoprolol tartrate (LOPRESSOR) 25 mg tablet Take 1 Tablet by mouth two (2) times a day.    gabapentin (NEURONTIN) 400 mg capsule Take 400 mg by mouth in the morning. albuterol-ipratropium (DUO-NEB) 2.5 mg-0.5 mg/3 ml nebu 3 mL by Nebulization route every six (6) hours as needed for Wheezing. aspirin 81 mg chewable tablet Take 2 Tabs by mouth daily. umeclidinium-vilanterol (ANORO ELLIPTA) 62.5-25 mcg/actuation inhaler Take 1 Puff by inhalation daily. For COPD       Disposition:  TBD  Follow-up Information    None         Current Discharge Medication List        START taking these medications    Details   doxycycline (VIBRA-TABS) 100 mg tablet Take 1 Tablet by mouth two (2) times a day for 10 days.   Qty: 20 Tablet, Refills: 0  Start date: 2/3/2023, End date: 2/13/2023      guaiFENesin ER (Mucinex) 600 mg ER tablet Take 1 Tablet by mouth two (2) times a day. Qty: 20 Tablet, Refills: 0  Start date: 2/3/2023           CONTINUE these medications which have CHANGED    Details   albuterol (PROVENTIL HFA, VENTOLIN HFA, PROAIR HFA) 90 mcg/actuation inhaler Take 2 Puffs by inhalation every six (6) hours as needed for Wheezing. Qty: 18 g, Refills: 0  Start date: 2/3/2023             Diagnosis     Clinical Impression:   1.  Acute exacerbation of chronic obstructive pulmonary disease (COPD) ([facility] [address]

## 2023-02-04 VITALS
RESPIRATION RATE: 16 BRPM | HEART RATE: 96 BPM | DIASTOLIC BLOOD PRESSURE: 61 MMHG | SYSTOLIC BLOOD PRESSURE: 137 MMHG | OXYGEN SATURATION: 100 % | TEMPERATURE: 98.6 F

## 2023-02-04 LAB
ATRIAL RATE: 110 BPM
CALCULATED R AXIS, ECG10: -15 DEGREES
CALCULATED T AXIS, ECG11: -42 DEGREES
DIAGNOSIS, 93000: NORMAL
P-R INTERVAL, ECG05: 58 MS
Q-T INTERVAL, ECG07: 432 MS
QRS DURATION, ECG06: 146 MS
QTC CALCULATION (BEZET), ECG08: 584 MS
VENTRICULAR RATE, ECG03: 110 BPM

## 2023-02-04 PROCEDURE — 74011250637 HC RX REV CODE- 250/637: Performed by: EMERGENCY MEDICINE

## 2023-02-04 PROCEDURE — 90935 HEMODIALYSIS ONE EVALUATION: CPT

## 2023-02-04 RX ORDER — HEPARIN SODIUM 1000 [USP'U]/ML
3800 INJECTION, SOLUTION INTRAVENOUS; SUBCUTANEOUS
Status: DISCONTINUED | OUTPATIENT
Start: 2023-02-04 | End: 2023-02-04 | Stop reason: HOSPADM

## 2023-02-04 RX ADMIN — DOXYCYCLINE HYCLATE 100 MG: 100 CAPSULE ORAL at 11:54

## 2023-02-04 NOTE — ED NOTES
8:36 PM :Pt care assumed from A CAROLYN BA, ED provider. Pt complaint(s), current treatment plan, progression and available diagnostic results have been discussed thoroughly. The patient was seen and evaluated on my shift. Rounding occurred: no   Intended Disposition: TBD  Pending diagnostic reports and/or labs (please list): VQ scan, PVL, Doxy, and nephrology       11:51 PM  I have reviewed reassuring VQ scan and PVLs with patient. Have discussed case with nephrology who agrees patient can stay overnight and will arrange for dialysis first thing in the morning. Patient reports she would also prefer to stay and do dialysis in the morning. 1:25 AM : Pt care transferred to Dr. Justa Orlando ,ED provider. History of patient complaint(s), available diagnostic reports and current treatment plan has been discussed thoroughly.    Bedside rounding on patient occured : No   Intended disposition of patient : Home   Pending diagnostics reports and/or labs (please list): Dialysis in the Am

## 2023-02-04 NOTE — PROGRESS NOTES
1235 Received report from ANGEL Oliver RN; patient is ready to be dialyzed as confirmed. 1250 Patient is received, awake, alert and fully reactive. Denies any discomfort. Patient requested for NC O2 with noted 98% O2 saturation on room air with minimal SOB as complained. Lungs are clear but diminished.             ACUTE HEMODIALYSIS FLOW SHEET      HEMODIALYSIS ORDERS: Physician: Jorgito Rose     Dialyzer: Revaclear   Duration: 3.0   BFR: 350  DFR: 600   Dialysate:  Temp 36-37*C   K+  2    Ca+ 2.5   Na 138  Bicarb 32   Wt Readings from Last 1 Encounters:        Patient Chart [x]   Unable to Obtain []  Dry weight/UF Goal:2500   Heparin []  Bolus    Units    [] Hourly    Units    [x]None       Pre BP  149/38    Pulse: 94 Respirations: 18 Temp: 97.1 [] Temporal  [x] Ax  [] Esoph   Labs: []  Pre  []  Post:   [x] N/A   Additional Orders (medications, blood products, hypotension management): [] Yes   [x] No     CATHETER ACCESS:  []N/A   [x]Right   []Left   []IJ   []Fem  [x]Chest wall  []TransHepatic   [] First use X-ray verified     []Tunnel    [] Non Tunneled   [x]No S/S infection  []Redness  []Drainage []Cultured []Swelling []Pain   [x]Medical Aseptic Prep Utilized   [x]Dressing Changed  [x] Biopatch  Date: 02/04/2023   []Clotted   []Patent   Flows: [x]Good  []Poor  []Reversed   If access problem,  notified: []Yes    [x]N/A        GRAFT/FISTULA ACCESS:   [x]N/A     []Right     []Left     []UE     []LE                         GENERAL ASSESSMENT:    LUNGS:  Resp Rate 18   [x] Clear  [] Coarse  [] Crackles  [] Wheezing  [x] Diminished                                                           [] RLL   [] LLL  [x] RUL   [x] ISAURO            Respirations:  [x]Easy  []Labored  []N/A  Cough:  []Productive  []Dry  []N/A               Therapy:  [x]RA  as received [] Ventilated   [] Intubated   [] Trach            O2 Device:  [x] NC   [] NRB  [] Trach Mask  [] BiPaP  Flow:   2l/min per pt's request for minimal SOB CARDIAC: [x] Regular   radially   [] Irregular   [] Rhythm:          [] Monitored   [] Bedside   [] Remotely monitored       EDEMA: [x] None   []Generalized  [] Pitting [] 1+   [] 2 +   [] 3+    [] 4+        SKIN:   [] Hot     [] Cold    [x] Warm   [x] Dry    [] Diaphoretic                 [] Flushed  [] Jaundiced  [] Cyanotic  [] Pale      LOC:    [x] Alert      [x]Oriented:    [x] Person     [x] Place   [x]Time               [] Confused  [] Lethargic  [] Medicated  [] Non-responsive  [] Non-Verbal     GI / ABDOMEN:                     [] Flat    [] Distended    [x] Soft    [] Firm   []  Obese                   [] Diarrhea   [] FMS [x] Bowel Sounds audible [] Nausea  [] Vomiting                   [] NGT  [] OGT  [] PEG  [] Tube Feedings @     mL/hr     / URINE ASSESSMENT:                   [x] Voiding    [] Oliguria  [] Anuria                     []  Frye   [] Incontinent  []  Incontinent Brief   []  PureWick     PAIN:  [x] 0 []1  []2   []3   []4   []5   []6   []7   []8   []9   []10                MOBILITY:  [x] Bed    [] Stretcher      All Vitals and Treatment Details on Attached [address]: [facility]          Room # FT7/F7    [x] Routine         [] 1st Time Acute/Chronic   [] Urgent      [] Stat              [] Acute Room   []  Bedside    [] ICU/CCU     [] ER     Isolation Precautions:  [x] Dialysis    There are currently no Active Isolations     ALLERGIES:     No Known Allergies     Code Status:  Prior     Hepatitis Status      Lab Results   Component Value Date/Time    Hepatitis A, IgM NEGATIVE 03/23/2015 09:04 AM    Hepatitis B surface Ag <0.10 12/09/2022 07:03 PM    Hepatitis B surface Ab >1,000.00 12/09/2022 07:03 PM    Hepatitis B core, IgM NEGATIVE 03/23/2015 09:04 AM    Hepatitis C virus Ab 0.09 03/23/2015 09:04 AM        Current Labs:      Lab Results   Component Value Date/Time    WBC 5.8 02/03/2023 03:42 PM    HGB 9.1 (L) 02/03/2023 03:42 PM    HCT 29.4 (L) 02/03/2023 03:42 PM    PLATELET 842 84/83/4386 03:42 PM    MCV 92.2 02/03/2023 03:42 PM     Lab Results   Component Value Date/Time    Sodium 137 02/03/2023 03:42 PM    Potassium 3.6 02/03/2023 03:42 PM    Chloride 101 02/03/2023 03:42 PM    CO2 28 02/03/2023 03:42 PM    Anion gap 8 02/03/2023 03:42 PM    Glucose 115 (H) 02/03/2023 03:42 PM    BUN 14 02/03/2023 03:42 PM    Creatinine 2.08 (H) 02/03/2023 03:42 PM    BUN/Creatinine ratio 7 (L) 02/03/2023 03:42 PM    GFR est AA 54 (L) 09/22/2022 04:20 AM    GFR est non-AA 44 (L) 09/22/2022 04:20 AM    Calcium 9.4 02/03/2023 03:42 PM          DIET:  None     PRIMARY NURSE REPORT:   Pre Dialysis: ANGEL Oliver RN    Time: 1182    EDUCATION:    [x] Patient           Knowledge Basis: []None []Minimal [] Substantial [] Unknown  Barriers to learning  [x]None  [] Intubated/Trached/Ventilated  [] Sedated/Paralyzed   [x] Access Care     [] S&S of infection  [] Fluid Management  [x] K+   [] Procedural    [] Medications   [] Tx Options   [] Transplant   [] Diet      Teaching Tools:  [x] Explain  [] Demo  [] Handouts [] Video  Patient response: [x] Verbalized understanding   [] Requires follow up        [x] Time Out/Safety Check    [x] Extracorporeal Circuit Tested for integrity       RO/HEMODIALYSIS MACHINE SAFETY CHECKS - Before each treatment:        [address] [place] [address]                                     [] Unit Machine #  with centralized RO                                  [] Portable Machine #1/RO serial # [id number]                                  [] Portable Machine #2/RO serial # [id number]                                  [x] Portable Machine #4/RO serial # [id number]                                  [] Portable Machine #3/RO serial # [id number]                                                                                                       Alarm Test:  Pass time 5179         [x] RO/Machine Log Complete    Machine Temp    36-37*C             Dialysate: pH  7.4    Conductivity: Meter 14.0    HD Machine  14    TCD: 13.7  Dialyzer Lot # [id number]    Blood Tubing Lot # [id number]    Saline Lot #[id number]     CHLORINE TESTING-Before each treatment and every 4 hours    Total Chlorine: [x] less than 0.1 ppm  Initial Time Check: 1300     4 Hr/2nd Check Time:    (if greater than 0.1 ppm from Primary then every 30 minutes from Secondary)     TREATMENT INITIATION - with Dialysis Precautions:   [x] All Connections Secured              [x] Saline Line Double Clamped   [x] Venous Parameters Set               [x] Arterial Parameters Set    [x] Prime Given 250ml NSS              [x]Air Foam Detector Engaged          Treatment Initiation Note:  0228 Successfully initiated HD treatment after aseptically accessing TDC, wearing required PPEs. Dressing was changed prior to start of treatment and noted 4 cm length of catheter from tip of hub to situ. During Treatment Notes:    6100  Face & Vascular access visible with art and adan line connections intact. Pt tolerating dialysis. 1330  Face & Vascular access visible with art and adan line connections intact. Pt tolerating dialysis. 1345 Face & Vascular access visible with art and adan line connections intact. Pt tolerating dialysis. 1400 Face & Vascular access visible with art and adan line connections intact. Pt tolerating dialysis. 1415  Face & Vascular access visible with art and adan line connections intact. Pt tolerating dialysis. 1430 Face & Vascular access visible with art and adan line connections intact. Pt tolerating dialysis. 1445 Face & Vascular access visible with art and adan line connections intact. Pt tolerating dialysis. Given report to BERNARD Gonzalez.  Patient is awake and fully reactive           Medication    Dose    Volume Route      Time       [facility] Nurse      HD        HD         HD                  Post Assessment        Abbreviations: AVG-arterial venous graft, AVF-arterial venous fistula, IJ-Internal Jugular, Subcl-Subclavian, Fem-Femoral, Tx-treatment, AP/HR-apical heart rate, VSS- Vital Signs Stable, CVC- Central Venous Catheter, DFR-dialysate flow rate, BFR-blood flow rate, AP-arterial pressure, -venous pressure, UF-ultrafiltrate, TMP-transmembrane pressure, Evelio-Venous, Art-Arterial, RO-Reverse Osmosis     Administered 1L/min via NC.

## 2023-02-04 NOTE — PROGRESS NOTES
Assumed care of patient at 1450. ACUTE HEMODIALYSIS FLOW SHEET    [x]  [facility] Consent Verified       Hospital: [facility]          Room # FT7/F7    [x] Routine         [] 1st Time Acute/Chronic   [] Urgent      [] Stat            [] Acute Room   [x]  Bedside    [] ICU/CCU     [x] ER     Isolation Precautions:  [x] Dialysis    There are currently no Active Isolations     ALLERGIES:     No Known Allergies     Code Status:  Prior     Hepatitis Status      Lab Results   Component Value Date/Time    Hepatitis A, IgM NEGATIVE 03/23/2015 09:04 AM    Hepatitis B surface Ag <0.10 12/09/2022 07:03 PM    Hepatitis B surface Ab >1,000.00 12/09/2022 07:03 PM    Hepatitis B core, IgM NEGATIVE 03/23/2015 09:04 AM    Hepatitis C virus Ab 0.09 03/23/2015 09:04 AM        Current Labs:      Lab Results   Component Value Date/Time    WBC 5.8 02/03/2023 03:42 PM    HGB 9.1 (L) 02/03/2023 03:42 PM    HCT 29.4 (L) 02/03/2023 03:42 PM    PLATELET 027 05/68/8200 03:42 PM    MCV 92.2 02/03/2023 03:42 PM     Lab Results   Component Value Date/Time    Sodium 137 02/03/2023 03:42 PM    Potassium 3.6 02/03/2023 03:42 PM    Chloride 101 02/03/2023 03:42 PM    CO2 28 02/03/2023 03:42 PM    Anion gap 8 02/03/2023 03:42 PM    Glucose 115 (H) 02/03/2023 03:42 PM    BUN 14 02/03/2023 03:42 PM    Creatinine 2.08 (H) 02/03/2023 03:42 PM    BUN/Creatinine ratio 7 (L) 02/03/2023 03:42 PM    GFR est AA 54 (L) 09/22/2022 04:20 AM    GFR est non-AA 44 (L) 09/22/2022 04:20 AM    Calcium 9.4 02/03/2023 03:42 PM          DIET:  None             CHLORINE TESTING-Before each treatment and every 4 hours    Total Chlorine: [x] less than 0.1 ppm  Initial Time Check: 1300       4 Hr/2nd Check Time: NR   (if greater than 0.1 ppm from Primary then every 30 minutes from Secondary)           During Treatment Notes:  1500  Face & Vascular access visible with art and adan line connections intact. Pt tolerating dialysis.   1515  Face & Vascular access visible with art and adan line connections intact. Pt tolerating dialysis. 1530  Face & Vascular access visible with art and adan line connections intact. Pt tolerating dialysis. 1545  Face & Vascular access visible with art and adan line connections intact. Pt tolerating dialysis. 1600  Face & Vascular access visible with art and adan line connections intact. Pt tolerating dialysis. 1615  Face & Vascular access visible with art and adan line connections intact. Pt tolerating dialysis. 1624  Dialysis treatment complete. Medication    Dose    Volume Route      Time       [facility] Nurse      HD        HD        HD       Post Assessment  Dialyzer Cleared:   [] Good  [x] Fair  [] Poor  Blood processed:  56.4 L  UF Removed:  2500 Ml    Post BP: 137/61  Pulse: 96  Respirations: 16   Temp: 98.6  [x] Oral  [] Ax  [] Esophageal   Lungs: [] Clear                [x] No change from initial assessment   Post Tx Vascular Access: [x] N/A  AVF/AVG: Bleeding stopped with  Arterial Pressure for  min   Venous Pressure for  min      Cardiac:  [x] Regular   [] Irregular   Rhythm:  [] Monitored   [] Not Monitored    CVC Catheter: [x]   Locking solution: Heparin 1:1000 U  Arterial port 1.9 ml   Venous port 1.9 ml   Edema:  [x] None  [] Generalized                     Skin:[x] Warm  [x] Dry [] Diaphoretic               [] Flushed  [] Pale [] Cyanotic Pain:  [x]0  []1-2  []3-4  []5-6   []7-8  []9-10         Post Treatment Note:  Vascular access visible at all times during treatment, line connections intact at all times during treatment. Tx completed at 1624. Net removal 2500ml. Pt. Tolerated treatment well. De-accessed per protocol. Heparin indwell 1.9ml in arterial, and 1.9ml in venous catheter. Unit nurse given report. POST TREATMENT PRIMARY NURSE HANDOFF REPORT:   Post Dialysis: ANGEL Oliver RN        Time:  1537       Abbreviations: AVG-arterial venous graft, AVF-arterial venous fistula, IJ-Internal Jugular, Subcl-Subclavian, Fem-Femoral, Tx-treatment, AP/HR-apical heart rate, VSS- Vital Signs Stable, CVC- Central Venous Catheter, DFR-dialysate flow rate, BFR-blood flow rate, AP-arterial pressure, -venous pressure, UF-ultrafiltrate, TMP-transmembrane pressure, Evelio-Venous, Art-Arterial, RO-Reverse Osmosis

## 2023-02-04 NOTE — PROGRESS NOTES
Seen and examined  Appears comfortable at bedside  Came in for sob  Work up essentially neg. Could have some fluid component  Will get her HD 'd today  Orders placed

## 2023-02-06 ENCOUNTER — OFFICE VISIT (OUTPATIENT)
Dept: SURGERY | Age: 73
End: 2023-02-06
Payer: MEDICARE

## 2023-02-06 VITALS
HEART RATE: 103 BPM | OXYGEN SATURATION: 99 % | WEIGHT: 196 LBS | BODY MASS INDEX: 33.46 KG/M2 | DIASTOLIC BLOOD PRESSURE: 65 MMHG | SYSTOLIC BLOOD PRESSURE: 121 MMHG | HEIGHT: 64 IN

## 2023-02-06 DIAGNOSIS — Z93.3 STATUS POST COLOSTOMY (HCC): Primary | ICD-10-CM

## 2023-02-06 PROCEDURE — 99024 POSTOP FOLLOW-UP VISIT: CPT | Performed by: SURGERY

## 2023-02-06 NOTE — PROGRESS NOTES
Patient seen and examined. She is doing relatively well and she is here with her  and they stated that the home health are coming to her home and they are changing the colostomy bags. She is tolerating a regular diet and her colostomy is functioning normal.  Her midline wound is clean and I removed the staples in her colostomy is functioning. I had a long discussion with the patient and she never had a colonoscopy before so I would like her first to have a colonoscopy to make sure there is no other pathology before we takedown her colostomy. The patient has multiple medical conditions including renal failure on dialysis but she seems to be doing relatively well and I believe that it would be fine to close her colostomy in 3 months since the original [facility] procedure. We agreed on this plan and we will get a colonoscopy first done meanwhile and then we will probably see her in about a month or 2 for further evaluation before taking down her colostomy.

## 2023-02-07 ENCOUNTER — HOME CARE VISIT (OUTPATIENT)
Dept: HOME HEALTH SERVICES | Facility: HOME HEALTH | Age: 73
End: 2023-02-07
Payer: MEDICARE

## 2023-02-08 ENCOUNTER — OFFICE VISIT (OUTPATIENT)
Dept: VASCULAR SURGERY | Age: 73
End: 2023-02-08
Payer: MEDICARE

## 2023-02-08 ENCOUNTER — ANESTHESIA EVENT (OUTPATIENT)
Dept: CARDIOTHORACIC SURGERY | Age: 73
End: 2023-02-08
Payer: MEDICARE

## 2023-02-08 VITALS
HEIGHT: 64 IN | WEIGHT: 195.99 LBS | SYSTOLIC BLOOD PRESSURE: 126 MMHG | DIASTOLIC BLOOD PRESSURE: 80 MMHG | HEART RATE: 88 BPM | BODY MASS INDEX: 33.46 KG/M2 | OXYGEN SATURATION: 99 %

## 2023-02-08 DIAGNOSIS — Z99.2 ESRD (END STAGE RENAL DISEASE) ON DIALYSIS (HCC): Primary | ICD-10-CM

## 2023-02-08 DIAGNOSIS — N18.6 ESRD (END STAGE RENAL DISEASE) ON DIALYSIS (HCC): Primary | ICD-10-CM

## 2023-02-08 PROCEDURE — 1090F PRES/ABSN URINE INCON ASSESS: CPT | Performed by: SURGERY

## 2023-02-08 PROCEDURE — G8417 CALC BMI ABV UP PARAM F/U: HCPCS | Performed by: SURGERY

## 2023-02-08 PROCEDURE — 3074F SYST BP LT 130 MM HG: CPT | Performed by: SURGERY

## 2023-02-08 PROCEDURE — G9711 PT HX TOT COL OR COLON CA: HCPCS | Performed by: SURGERY

## 2023-02-08 PROCEDURE — 99213 OFFICE O/P EST LOW 20 MIN: CPT | Performed by: SURGERY

## 2023-02-08 PROCEDURE — 3079F DIAST BP 80-89 MM HG: CPT | Performed by: SURGERY

## 2023-02-08 PROCEDURE — G8536 NO DOC ELDER MAL SCRN: HCPCS | Performed by: SURGERY

## 2023-02-08 PROCEDURE — G8510 SCR DEP NEG, NO PLAN REQD: HCPCS | Performed by: SURGERY

## 2023-02-08 PROCEDURE — 1111F DSCHRG MED/CURRENT MED MERGE: CPT | Performed by: SURGERY

## 2023-02-08 PROCEDURE — G8427 DOCREV CUR MEDS BY ELIG CLIN: HCPCS | Performed by: SURGERY

## 2023-02-08 PROCEDURE — 1123F ACP DISCUSS/DSCN MKR DOCD: CPT | Performed by: SURGERY

## 2023-02-08 PROCEDURE — G8399 PT W/DXA RESULTS DOCUMENT: HCPCS | Performed by: SURGERY

## 2023-02-08 PROCEDURE — 1101F PT FALLS ASSESS-DOCD LE1/YR: CPT | Performed by: SURGERY

## 2023-02-08 NOTE — PROGRESS NOTES
1. Have you been to the ER, urgent care clinic since your last visit? Yes / Marga hernández / feb      Hospitalized since your last visit? Yes Alanna tsang      2. Have you seen or consulted any other health care providers outside of the [address] [place] [address] since your last visit? Include any pap smears or colon screening.   No

## 2023-02-08 NOTE — H&P (VIEW-ONLY)
BRIEF OUTPATIENT VASCULAR HISTORY AND PHYSICAL  February 8, 2023 1:19 PM    IMPRESSION:  73y/o dialysis-dependent female with RUE brachio-cephalic fistula. Fistula patent, but too deep and not fully matured - likely secondary to stenosis proximal to the anastomosis. PLAN/PLANNED PROCEDURE:RUE fistula revision with superficialization and branch ligation. Likely fistulagram and angioplasty of stenosed area vs. anastomotic revision. Risks/benefits discussed with patient. Risks include, but are not limited to bleeding, infection, injury to surrounding structures, failure to mature, fistula thrombosis, and steal syndrome. Patient Active Problem List   Diagnosis Code    Cardiomyopathy, dilated ([facility] I42.0    Environmental allergies Z91.09    Essential hypertension I10    Iron deficiency anemia D50.9    Decreased GFR R94.4    Dyslipidemia, goal LDL below 70 E78.5    Former smoker Z87.891    Osteoarthritis of both knees M17.0    Chronic pain of both knees M25.561, M25.562, G89.29    Chronic pain syndrome G89.4    Primary osteoarthritis of both knees M17.0    Chronic obstructive pulmonary disease (HCC) J44.9    Compliance with medication regimen Z91.89    Bilateral shoulder pain M25.511, M25.512    Elevated alkaline phosphatase level R74.8    Caregiver stress Z63.6    STEMI (ST elevation myocardial infarction) ([facility]) I21.3    Acute pulmonary edema ([facility]) J81.0    Coronary artery disease involving native coronary artery of native heart without angina pectoris I25.10    Pulmonary edema J81.1    SOB (shortness of breath) R06.02    Coronary artery disease involving native coronary artery with unstable angina pectoris ([facility]) I25.110    Type 2 diabetes mellitus with hyperglycemia, with long-term current use of insulin ([facility]) E11.65, Z79.4    Severe obesity (BMI 35.0-39. 9) with comorbidity ([facility]) E66.01    Type 2 diabetes mellitus with hyperglycemia ([facility]) E11.65    TESSA on CPAP G47.33, Z99.89    Ischemic cardiomyopathy I25.5 Syncope R55    Flash pulmonary edema (HCC) J81.0    Elevated d-dimer R79.89    Acute respiratory failure with hypoxia (HCC) J96.01    Pulmonary edema cardiac cause (HCC) I50.1    COPD with acute exacerbation (HCC) J44.1    Respiratory failure requiring intubation (HCC) J96.90    CAD (coronary artery disease) I25.10    Chronic systolic congestive heart failure (HCC) I50.22    Nonrheumatic mitral valve regurgitation I34.0    Sepsis ([facility]) A41.9    Hypotension I95.9    Stage 1 acute kidney injury (HCC) N17.9    Hyperkalemia E87.5    Acute hyperkalemia E87.5    Breast cancer ([facility]) C50.919    Rectal cancer ([facility]) C20    History of myocardial infarction I25.2    Pulmonary hypertension ([facility]) I27.20    Acute exacerbation of CHF (congestive heart failure) ([facility]) I50.9    Pneumonia due to COVID-19 virus U07.1, J12.82    Acute on chronic diastolic heart failure ([facility]) I50.33    Stage 3b chronic kidney disease ([facility]) N18.32    CHF (congestive heart failure) ([facility]) I50.9    CVA (cerebral vascular accident) ([facility] [address] I63.9    ESRD (end stage renal disease) on dialysis ([facility] N18.6, Z99.2    TIA (transient ischemic attack) G45.9    Dizziness R42    Stenosis of both vertebral arteries I65.03    Sinus tachycardia R00.0    Chronic heart failure with preserved ejection fraction ([facility]) I50.32    Anemia due to chronic kidney disease, on chronic dialysis ([facility]) N18.6, D63.1, Z99.2       Jay Cerda is a 81 y.o. female  Hx OF PRESENT ILLNESS: This is a 71yo dialysis-dependent female with RUE fistula which is slow to mature. There is evidence of stenosis near the anastomosis. Fistula also remains somewhat deep. No steal symptoms. Past Medical History:   Diagnosis Date    Abnormal WBC count 05/17/2016    Anemia     Bilateral shoulder pain 09/27/2016    Chondromalacia of both patellae     Chronic heart failure with preserved ejection fraction ([address] 1/13/2023    COPD (chronic obstructive pulmonary disease) ([facility] [facility] 09/2015    mild-mod dz; Dr Marianna Galvez. Edmundo    Diabetes ([facility]) 2013    Diabetic eye exam ([facility]) 2016    Dilated cardiomyopathy ([facility])     Dyslipidemia     Gout     Heart attack ([facility]) 10/18/2019    Stented    History of echocardiogram 11/14/2014    Mild LVE. EF 40%. Mild, diffuse hypk. Mild LAE. Mild MR. Hypercholesteremia 01/08/2014    Hypertension 2000    Noncompliance with medications 02/16/2016    Obesity     Orthostatic hypotension 05/17/2016    Osteoarthritis of both knees     Pain management 04/01/2016    Dr. Thomas Agosto     Popliteal cyst, bilateral      Sleep apnea     not using cpap    Stage 3b chronic kidney disease ([facility]) 08/08/2022    Vitamin D deficiency 10/16/2014     Past Surgical History:   Procedure Laterality Date    HX HEART CATHETERIZATION  2019    Coronary stent    HX OTHER SURGICAL  01/18/2023    Exploratory laparotomy, sigmoid colectomy, and descending colostomy    HX TUBAL LIGATION      IR INSERT TUNL CVC W/O PORT OVER 5 YR  09/21/2022       Current Outpatient Medications:     doxycycline (VIBRA-TABS) 100 mg tablet, Take 1 Tablet by mouth two (2) times a day for 10 days. , Disp: 20 Tablet, Rfl: 0    albuterol (PROVENTIL HFA, VENTOLIN HFA, PROAIR HFA) 90 mcg/actuation inhaler, Take 2 Puffs by inhalation every six (6) hours as needed for Wheezing., Disp: 18 g, Rfl: 0    guaiFENesin ER (Mucinex) 600 mg ER tablet, Take 1 Tablet by mouth two (2) times a day., Disp: 20 Tablet, Rfl: 0    docusate sodium (COLACE) 100 mg capsule, Take 1 Capsule by mouth two (2) times a day., Disp: 60 Capsule, Rfl: 0    polyethylene glycol (MIRALAX) 17 gram packet, Take 1 Packet by mouth daily as needed for Constipation. , Disp: 15 Each, Rfl: 0    pantoprazole (Protonix) 40 mg tablet, Take 1 Tablet by mouth daily. , Disp: 30 Tablet, Rfl: 0    OTHER, Gentamicin to be given with hemodialysis through January 31, 2023.   Nephrology to arrange, Disp: 1 Each, Rfl: 0    OTHER, Incentive spirometry-use as directed, Disp: 1 Each, Rfl: 0    OTHER, Graded compression stockings-bilateral lower extremities-use as directed, Disp: 1 Each, Rfl: 0    atorvastatin (LIPITOR) 80 mg tablet, Take 80 mg by mouth nightly., Disp: , Rfl:     OneTouch Ultra Test strip, USE TO TEST BLOOD SUGAR TWICE DAILY, Disp: , Rfl:     ezetimibe (ZETIA) 10 mg tablet, Take 10 mg by mouth daily. , Disp: , Rfl:     metoprolol tartrate (LOPRESSOR) 25 mg tablet, Take 1 Tablet by mouth two (2) times a day., Disp: 60 Tablet, Rfl: 0    gabapentin (NEURONTIN) 400 mg capsule, Take 400 mg by mouth in the morning., Disp: , Rfl:     albuterol-ipratropium (DUO-NEB) 2.5 mg-0.5 mg/3 ml nebu, 3 mL by Nebulization route every six (6) hours as needed for Wheezing., Disp: 30 Nebule, Rfl: 0    aspirin 81 mg chewable tablet, Take 2 Tabs by mouth daily. , Disp: 60 Tab, Rfl: 0    umeclidinium-vilanterol (ANORO ELLIPTA) 62.5-25 mcg/actuation inhaler, Take 1 Puff by inhalation daily.  For COPD, Disp: 3 Inhaler, Rfl: 3  No Known Allergies  Family History   Problem Relation Age of Onset    Diabetes Mother     Hypertension Mother     Hypertension Father     Kidney Disease Maternal Aunt 48        Dialysis     Social History     Socioeconomic History    Marital status:      Spouse name: Not on file    Number of children: 3    Years of education: Not on file    Highest education level: Not on file   Occupational History    Occupation: retired   Tobacco Use    Smoking status: Former     Packs/day: 1.00     Years: 52.00     Pack years: 52.00     Types: Cigarettes     Quit date: 5/20/2019     Years since quitting: 3.7     Passive exposure: Never    Smokeless tobacco: Never   Vaping Use    Vaping Use: Never used   Substance and Sexual Activity    Alcohol use: No     Alcohol/week: 0.0 standard drinks    Drug use: Never    Sexual activity: Yes     Partners: Male   Other Topics Concern     Service No    Blood Transfusions No    Caffeine Concern No    Occupational Exposure No    Hobby Hazards No    Sleep Concern No Stress Concern No    Weight Concern No    Special Diet No    Back Care No    Exercise No    Bike Helmet No    Seat Belt Yes    Self-Exams Yes   Social History Narrative    Not on file     Social Determinants of Health     Financial Resource Strain: Not on file   Food Insecurity: Not on file   Transportation Needs: Not on file   Physical Activity: Not on file   Stress: Not on file   Social Connections: Not on file   Intimate Partner Violence: Not on file   Housing Stability: Not on file       ROS: Pertinent items are noted in HPI. PHYSICAL EXAMINATION:  /80 (BP 1 Location: Left arm, BP Patient Position: Sitting)   Pulse 88   Ht 5' 4\" (1.626 m)   Wt 195 lb 15.8 oz (88.9 kg)   SpO2 99%   BMI 33.64 kg/m²   Heart:Regular rate and rhythm, S1S2 present, or without murmur or extra heart sounds  Lungs: clear to auscultation bilaterally  Other physical exam comments: Well healed incision just proximal to right AC fossa. Palpable pulse in fistula. Right radial pulse non-palpable, but good, triphasic Doppler signal present.     There is a more extensive H&P that has been dictated or is located elsewhere in the chart: tomi Cortes MD  02/08/23  1:19 PM

## 2023-02-08 NOTE — PERIOP NOTES
PRE-SURGICAL INSTRUCTIONS        Patient's Name:  Angella Shannon      LQYPS'G Date:  2/8/2023              Surgery Date:  2/9/2023                Do NOT eat or drink anything, including candy, gum, or ice chips after midnight on 2/9/23, unless you have specific instructions from your surgeon or anesthesia provider to do so. You may brush your teeth before coming to the hospital.  No smoking 24 hours prior to the day of surgery. No alcohol 24 hours prior to the day of surgery. No recreational drugs for one week prior to the day of surgery. Leave all valuables, including money/purse, at home. Remove all jewelry, nail polish, acrylic nails, and makeup (including mascara); no lotions powders, deodorant, or perfume/cologne/after shave on the skin. Follow instruction for Hibiclens washes and CHG wipes from surgeon's office. Glasses/contact lenses and dentures may be worn to the hospital.  They will be removed prior to surgery. Call your doctor if symptoms of a cold or illness develop within 24-48 hours prior to your surgery. 11.  If you are having an outpatient procedure, please make arrangements for a responsible ADULT TO [address] [place] [address] and stay with you for 24 hours after your surgery. 12. ONE VISITOR in the hospital at this time for outpatient procedures. Exceptions may be made for surgical admissions, per nursing unit guidelines      Special Instructions:      Bring list of CURRENT medications. Bring inhaler. Bring any pertinent legal medical records. Take these medications the morning of surgery with a sip of water:  as instructed by office        On the day of surgery, come in the main entrance of [facility]. Let the  at the desk know you are there for surgery. A staff member will come escort you to the surgical area on the second floor.     If you have any questions or concerns, please do not hesitate to call:     (Prior to the day of surgery) [facility] department:  [phone]   (Day of surgery) Pre-Op department:  [phone]    These surgical instructions were reviewed with Mac Seip during the PAT phone call.

## 2023-02-08 NOTE — PROGRESS NOTES
BRIEF OUTPATIENT VASCULAR HISTORY AND PHYSICAL  February 8, 2023 1:19 PM    IMPRESSION:  73y/o dialysis-dependent female with RUE brachio-cephalic fistula. Fistula patent, but too deep and not fully matured - likely secondary to stenosis proximal to the anastomosis. PLAN/PLANNED PROCEDURE:RUE fistula revision with superficialization and branch ligation. Likely fistulagram and angioplasty of stenosed area vs. anastomotic revision. Risks/benefits discussed with patient. Risks include, but are not limited to bleeding, infection, injury to surrounding structures, failure to mature, fistula thrombosis, and steal syndrome. Patient Active Problem List   Diagnosis Code    Cardiomyopathy, dilated ([facility] I42.0    Environmental allergies Z91.09    Essential hypertension I10    Iron deficiency anemia D50.9    Decreased GFR R94.4    Dyslipidemia, goal LDL below 70 E78.5    Former smoker Z87.891    Osteoarthritis of both knees M17.0    Chronic pain of both knees M25.561, M25.562, G89.29    Chronic pain syndrome G89.4    Primary osteoarthritis of both knees M17.0    Chronic obstructive pulmonary disease (HCC) J44.9    Compliance with medication regimen Z91.89    Bilateral shoulder pain M25.511, M25.512    Elevated alkaline phosphatase level R74.8    Caregiver stress Z63.6    STEMI (ST elevation myocardial infarction) ([facility]) I21.3    Acute pulmonary edema ([facility]) J81.0    Coronary artery disease involving native coronary artery of native heart without angina pectoris I25.10    Pulmonary edema J81.1    SOB (shortness of breath) R06.02    Coronary artery disease involving native coronary artery with unstable angina pectoris ([facility]) I25.110    Type 2 diabetes mellitus with hyperglycemia, with long-term current use of insulin ([facility]) E11.65, Z79.4    Severe obesity (BMI 35.0-39. 9) with comorbidity ([facility]) E66.01    Type 2 diabetes mellitus with hyperglycemia ([facility]) E11.65    TESSA on CPAP G47.33, Z99.89    Ischemic cardiomyopathy I25.5 Syncope R55    Flash pulmonary edema (HCC) J81.0    Elevated d-dimer R79.89    Acute respiratory failure with hypoxia (HCC) J96.01    Pulmonary edema cardiac cause ([facility]) I50.1    COPD with acute exacerbation (HCC) J44.1    Respiratory failure requiring intubation (HCC) J96.90    CAD (coronary artery disease) I25.10    Chronic systolic congestive heart failure (HCC) I50.22    Nonrheumatic mitral valve regurgitation I34.0    Sepsis ([facility]) A41.9    Hypotension I95.9    Stage 1 acute kidney injury (HCC) N17.9    Hyperkalemia E87.5    Acute hyperkalemia E87.5    Breast cancer ([facility]) C50.919    Rectal cancer ([facility]) C20    History of myocardial infarction I25.2    Pulmonary hypertension ([facility]) I27.20    Acute exacerbation of CHF (congestive heart failure) ([facility]) I50.9    Pneumonia due to COVID-19 virus U07.1, J12.82    Acute on chronic diastolic heart failure ([facility]) I50.33    Stage 3b chronic kidney disease ([facility]) N18.32    CHF (congestive heart failure) ([facility]) I50.9    CVA (cerebral vascular accident) ([facility] I63.9    ESRD (end stage renal disease) on dialysis ([facility] N18.6, Z99.2    TIA (transient ischemic attack) G45.9    Dizziness R42    Stenosis of both vertebral arteries I65.03    Sinus tachycardia R00.0    Chronic heart failure with preserved ejection fraction ([facility]) I50.32    Anemia due to chronic kidney disease, on chronic dialysis ([facility]) N18.6, D63.1, Z99.2       Piper Moncada is a 81 y.o. female  Hx OF PRESENT ILLNESS: This is a 71yo dialysis-dependent female with RUE fistula which is slow to mature. There is evidence of stenosis near the anastomosis. Fistula also remains somewhat deep. No steal symptoms. Past Medical History:   Diagnosis Date    Abnormal WBC count 05/17/2016    Anemia     Bilateral shoulder pain 09/27/2016    Chondromalacia of both patellae     Chronic heart failure with preserved ejection fraction ([address] 1/13/2023    COPD (chronic obstructive pulmonary disease) ([facility] 09/2015    mild-mod dz; Dr Nawaf Garduno. Edmundo    Diabetes ([facility] 2013    Diabetic eye exam ([facility] 2016    Dilated cardiomyopathy ([facility]     Dyslipidemia     Gout     Heart attack ([facility] 10/18/2019    Stented    History of echocardiogram 11/14/2014    Mild LVE. EF 40%. Mild, diffuse hypk. Mild LAE. Mild MR. Hypercholesteremia 01/08/2014    Hypertension 2000    Noncompliance with medications 02/16/2016    Obesity     Orthostatic hypotension 05/17/2016    Osteoarthritis of both knees     Pain management 04/01/2016    Dr. Nicole Menezes     Popliteal cyst, bilateral      Sleep apnea     not using cpap    Stage 3b chronic kidney disease ([facility] 08/08/2022    Vitamin D deficiency 10/16/2014     Past Surgical History:   Procedure Laterality Date    HX HEART CATHETERIZATION  2019    Coronary stent    HX OTHER SURGICAL  01/18/2023    Exploratory laparotomy, sigmoid colectomy, and descending colostomy    HX TUBAL LIGATION      IR INSERT TUNL CVC W/O PORT OVER 5 YR  09/21/2022       Current Outpatient Medications:     doxycycline (VIBRA-TABS) 100 mg tablet, Take 1 Tablet by mouth two (2) times a day for 10 days. , Disp: 20 Tablet, Rfl: 0    albuterol (PROVENTIL HFA, VENTOLIN HFA, PROAIR HFA) 90 mcg/actuation inhaler, Take 2 Puffs by inhalation every six (6) hours as needed for Wheezing., Disp: 18 g, Rfl: 0    guaiFENesin ER (Mucinex) 600 mg ER tablet, Take 1 Tablet by mouth two (2) times a day., Disp: 20 Tablet, Rfl: 0    docusate sodium (COLACE) 100 mg capsule, Take 1 Capsule by mouth two (2) times a day., Disp: 60 Capsule, Rfl: 0    polyethylene glycol (MIRALAX) 17 gram packet, Take 1 Packet by mouth daily as needed for Constipation. , Disp: 15 Each, Rfl: 0    pantoprazole (Protonix) 40 mg tablet, Take 1 Tablet by mouth daily. , Disp: 30 Tablet, Rfl: 0    OTHER, Gentamicin to be given with hemodialysis through January 31, 2023.   Nephrology to arrange, Disp: 1 Each, Rfl: 0    OTHER, Incentive spirometry-use as directed, Disp: 1 Each, Rfl: 0    OTHER, Graded compression stockings-bilateral lower extremities-use as directed, Disp: 1 Each, Rfl: 0    atorvastatin (LIPITOR) 80 mg tablet, Take 80 mg by mouth nightly., Disp: , Rfl:     OneTouch Ultra Test strip, USE TO TEST BLOOD SUGAR TWICE DAILY, Disp: , Rfl:     ezetimibe (ZETIA) 10 mg tablet, Take 10 mg by mouth daily. , Disp: , Rfl:     metoprolol tartrate (LOPRESSOR) 25 mg tablet, Take 1 Tablet by mouth two (2) times a day., Disp: 60 Tablet, Rfl: 0    gabapentin (NEURONTIN) 400 mg capsule, Take 400 mg by mouth in the morning., Disp: , Rfl:     albuterol-ipratropium (DUO-NEB) 2.5 mg-0.5 mg/3 ml nebu, 3 mL by Nebulization route every six (6) hours as needed for Wheezing., Disp: 30 Nebule, Rfl: 0    aspirin 81 mg chewable tablet, Take 2 Tabs by mouth daily. , Disp: 60 Tab, Rfl: 0    umeclidinium-vilanterol (ANORO ELLIPTA) 62.5-25 mcg/actuation inhaler, Take 1 Puff by inhalation daily.  For COPD, Disp: 3 Inhaler, Rfl: 3  No Known Allergies  Family History   Problem Relation Age of Onset    Diabetes Mother     Hypertension Mother     Hypertension Father     Kidney Disease Maternal Aunt 48        Dialysis     Social History     Socioeconomic History    Marital status:      Spouse name: Not on file    Number of children: 3    Years of education: Not on file    Highest education level: Not on file   Occupational History    Occupation: retired   Tobacco Use    Smoking status: Former     Packs/day: 1.00     Years: 52.00     Pack years: 52.00     Types: Cigarettes     Quit date: 5/20/2019     Years since quitting: 3.7     Passive exposure: Never    Smokeless tobacco: Never   Vaping Use    Vaping Use: Never used   Substance and Sexual Activity    Alcohol use: No     Alcohol/week: 0.0 standard drinks    Drug use: Never    Sexual activity: Yes     Partners: Male   Other Topics Concern     Service No    Blood Transfusions No    Caffeine Concern No    Occupational Exposure No    Hobby Hazards No    Sleep Concern No Stress Concern No    Weight Concern No    Special Diet No    Back Care No    Exercise No    Bike Helmet No    Seat Belt Yes    Self-Exams Yes   Social History Narrative    Not on file     Social Determinants of Health     Financial Resource Strain: Not on file   Food Insecurity: Not on file   Transportation Needs: Not on file   Physical Activity: Not on file   Stress: Not on file   Social Connections: Not on file   Intimate Partner Violence: Not on file   Housing Stability: Not on file       ROS: Pertinent items are noted in HPI. PHYSICAL EXAMINATION:  /80 (BP 1 Location: Left arm, BP Patient Position: Sitting)   Pulse 88   Ht 5' 4\" (1.626 m)   Wt 195 lb 15.8 oz (88.9 kg)   SpO2 99%   BMI 33.64 kg/m²   Heart:Regular rate and rhythm, S1S2 present, or without murmur or extra heart sounds  Lungs: clear to auscultation bilaterally  Other physical exam comments: Well healed incision just proximal to right AC fossa. Palpable pulse in fistula. Right radial pulse non-palpable, but good, triphasic Doppler signal present.     There is a more extensive H&P that has been dictated or is located elsewhere in the chart: tomi Escobedo MD  02/08/23  1:19 PM

## 2023-02-09 ENCOUNTER — HOME CARE VISIT (OUTPATIENT)
Dept: HOME HEALTH SERVICES | Facility: HOME HEALTH | Age: 73
End: 2023-02-09
Payer: MEDICARE

## 2023-02-09 ENCOUNTER — HOSPITAL ENCOUNTER (OUTPATIENT)
Age: 73
Setting detail: OUTPATIENT SURGERY
Discharge: HOME | End: 2023-02-09
Attending: SURGERY | Admitting: SURGERY
Payer: MEDICARE

## 2023-02-09 ENCOUNTER — ANESTHESIA (OUTPATIENT)
Dept: CARDIOTHORACIC SURGERY | Age: 73
End: 2023-02-09
Payer: MEDICARE

## 2023-02-09 ENCOUNTER — HOSPITAL ENCOUNTER (EMERGENCY)
Age: 73
Discharge: HOME HEALTH | End: 2023-02-10
Attending: EMERGENCY MEDICINE
Payer: MEDICARE

## 2023-02-09 ENCOUNTER — APPOINTMENT (OUTPATIENT)
Dept: GENERAL RADIOLOGY | Age: 73
End: 2023-02-09
Attending: SURGERY
Payer: MEDICARE

## 2023-02-09 VITALS
HEIGHT: 64 IN | DIASTOLIC BLOOD PRESSURE: 78 MMHG | OXYGEN SATURATION: 98 % | HEART RATE: 95 BPM | RESPIRATION RATE: 17 BRPM | BODY MASS INDEX: 35.34 KG/M2 | WEIGHT: 207 LBS | SYSTOLIC BLOOD PRESSURE: 109 MMHG | TEMPERATURE: 98.9 F

## 2023-02-09 DIAGNOSIS — Z99.2 ESRD NEEDING DIALYSIS (HCC): ICD-10-CM

## 2023-02-09 DIAGNOSIS — N18.6 ESRD NEEDING DIALYSIS (HCC): ICD-10-CM

## 2023-02-09 DIAGNOSIS — G89.18 POST-OPERATIVE PAIN: Primary | ICD-10-CM

## 2023-02-09 DIAGNOSIS — I77.0 A-V FISTULA (HCC): Primary | ICD-10-CM

## 2023-02-09 LAB
ANION GAP SERPL CALC-SCNC: 6 MMOL/L (ref 3–18)
BUN SERPL-MCNC: 30 MG/DL (ref 7–18)
BUN/CREAT SERPL: 11 (ref 12–20)
CALCIUM SERPL-MCNC: 9.1 MG/DL (ref 8.5–10.1)
CHLORIDE SERPL-SCNC: 108 MMOL/L (ref 100–111)
CO2 SERPL-SCNC: 29 MMOL/L (ref 21–32)
CREAT SERPL-MCNC: 2.72 MG/DL (ref 0.6–1.3)
GLUCOSE BLD STRIP.AUTO-MCNC: 112 MG/DL (ref 70–110)
GLUCOSE BLD STRIP.AUTO-MCNC: 137 MG/DL (ref 70–110)
GLUCOSE SERPL-MCNC: 133 MG/DL (ref 74–99)
POTASSIUM SERPL-SCNC: 4.1 MMOL/L (ref 3.5–5.5)
SODIUM SERPL-SCNC: 143 MMOL/L (ref 136–145)

## 2023-02-09 PROCEDURE — 77030014023 HC SYR INFL LVEEN BSC -B: Performed by: SURGERY

## 2023-02-09 PROCEDURE — 80048 BASIC METABOLIC PNL TOTAL CA: CPT

## 2023-02-09 PROCEDURE — 74011000250 HC RX REV CODE- 250: Performed by: NURSE ANESTHETIST, CERTIFIED REGISTERED

## 2023-02-09 PROCEDURE — 74011250636 HC RX REV CODE- 250/636: Performed by: NURSE ANESTHETIST, CERTIFIED REGISTERED

## 2023-02-09 PROCEDURE — 74011250636 HC RX REV CODE- 250/636: Performed by: SURGERY

## 2023-02-09 PROCEDURE — 82962 GLUCOSE BLOOD TEST: CPT

## 2023-02-09 PROCEDURE — 77030031139 HC SUT VCRL2 J&J -A: Performed by: SURGERY

## 2023-02-09 PROCEDURE — 77030003390 HC NDL ANGI MRTM -A: Performed by: SURGERY

## 2023-02-09 PROCEDURE — 74011250636 HC RX REV CODE- 250/636: Performed by: EMERGENCY MEDICINE

## 2023-02-09 PROCEDURE — 77030002933 HC SUT MCRYL J&J -A: Performed by: SURGERY

## 2023-02-09 PROCEDURE — 90935 HEMODIALYSIS ONE EVALUATION: CPT

## 2023-02-09 PROCEDURE — 74011250637 HC RX REV CODE- 250/637: Performed by: EMERGENCY MEDICINE

## 2023-02-09 PROCEDURE — 77030010507 HC ADH SKN DERMBND J&J -B: Performed by: SURGERY

## 2023-02-09 PROCEDURE — 74011250637 HC RX REV CODE- 250/637: Performed by: NURSE ANESTHETIST, CERTIFIED REGISTERED

## 2023-02-09 PROCEDURE — C1769 GUIDE WIRE: HCPCS | Performed by: SURGERY

## 2023-02-09 PROCEDURE — 2709999900 HC NON-CHARGEABLE SUPPLY: Performed by: SURGERY

## 2023-02-09 PROCEDURE — 77030002986 HC SUT PROL J&J -A: Performed by: SURGERY

## 2023-02-09 PROCEDURE — 76060000036 HC ANESTHESIA 2.5 TO 3 HR: Performed by: SURGERY

## 2023-02-09 PROCEDURE — 76010000109 HC CV SURG 2.5 TO 3 HR: Performed by: SURGERY

## 2023-02-09 PROCEDURE — 77030010509 HC AIRWY LMA MSK TELE -A: Performed by: ANESTHESIOLOGY

## 2023-02-09 PROCEDURE — C1894 INTRO/SHEATH, NON-LASER: HCPCS | Performed by: SURGERY

## 2023-02-09 PROCEDURE — 76210000016 HC OR PH I REC 1 TO 1.5 HR: Performed by: SURGERY

## 2023-02-09 PROCEDURE — 77030016441 HC APPL CLP LIG1 J&J -B: Performed by: SURGERY

## 2023-02-09 PROCEDURE — C1725 CATH, TRANSLUMIN NON-LASER: HCPCS | Performed by: SURGERY

## 2023-02-09 RX ORDER — HYDROMORPHONE HYDROCHLORIDE 2 MG/ML
INJECTION, SOLUTION INTRAMUSCULAR; INTRAVENOUS; SUBCUTANEOUS AS NEEDED
Status: DISCONTINUED | OUTPATIENT
Start: 2023-02-09 | End: 2023-02-09 | Stop reason: HOSPADM

## 2023-02-09 RX ORDER — SODIUM CHLORIDE 9 MG/ML
25 INJECTION, SOLUTION INTRAVENOUS CONTINUOUS
Status: DISCONTINUED | OUTPATIENT
Start: 2023-02-09 | End: 2023-02-09 | Stop reason: HOSPADM

## 2023-02-09 RX ORDER — HYDROMORPHONE HYDROCHLORIDE 1 MG/ML
0.5 INJECTION, SOLUTION INTRAMUSCULAR; INTRAVENOUS; SUBCUTANEOUS
Status: DISCONTINUED | OUTPATIENT
Start: 2023-02-09 | End: 2023-02-09 | Stop reason: HOSPADM

## 2023-02-09 RX ORDER — HYDROCODONE BITARTRATE AND ACETAMINOPHEN 5; 325 MG/1; MG/1
1 TABLET ORAL
Status: COMPLETED | OUTPATIENT
Start: 2023-02-09 | End: 2023-02-09

## 2023-02-09 RX ORDER — LIDOCAINE HYDROCHLORIDE 20 MG/ML
INJECTION, SOLUTION EPIDURAL; INFILTRATION; INTRACAUDAL; PERINEURAL AS NEEDED
Status: DISCONTINUED | OUTPATIENT
Start: 2023-02-09 | End: 2023-02-09 | Stop reason: HOSPADM

## 2023-02-09 RX ORDER — SODIUM CHLORIDE, SODIUM LACTATE, POTASSIUM CHLORIDE, CALCIUM CHLORIDE 600; 310; 30; 20 MG/100ML; MG/100ML; MG/100ML; MG/100ML
75 INJECTION, SOLUTION INTRAVENOUS CONTINUOUS
Status: DISCONTINUED | OUTPATIENT
Start: 2023-02-09 | End: 2023-02-09 | Stop reason: HOSPADM

## 2023-02-09 RX ORDER — FAMOTIDINE 20 MG/1
20 TABLET, FILM COATED ORAL ONCE
Status: COMPLETED | OUTPATIENT
Start: 2023-02-09 | End: 2023-02-09

## 2023-02-09 RX ORDER — LIDOCAINE HYDROCHLORIDE 10 MG/ML
0.1 INJECTION, SOLUTION EPIDURAL; INFILTRATION; INTRACAUDAL; PERINEURAL AS NEEDED
Status: DISCONTINUED | OUTPATIENT
Start: 2023-02-09 | End: 2023-02-09 | Stop reason: HOSPADM

## 2023-02-09 RX ORDER — FENTANYL CITRATE 50 UG/ML
INJECTION, SOLUTION INTRAMUSCULAR; INTRAVENOUS AS NEEDED
Status: DISCONTINUED | OUTPATIENT
Start: 2023-02-09 | End: 2023-02-09 | Stop reason: HOSPADM

## 2023-02-09 RX ORDER — PROPOFOL 10 MG/ML
INJECTION, EMULSION INTRAVENOUS AS NEEDED
Status: DISCONTINUED | OUTPATIENT
Start: 2023-02-09 | End: 2023-02-09 | Stop reason: HOSPADM

## 2023-02-09 RX ORDER — HEPARIN SODIUM 1000 [USP'U]/ML
INJECTION, SOLUTION INTRAVENOUS; SUBCUTANEOUS AS NEEDED
Status: DISCONTINUED | OUTPATIENT
Start: 2023-02-09 | End: 2023-02-09 | Stop reason: HOSPADM

## 2023-02-09 RX ORDER — IODIXANOL 320 MG/ML
INJECTION, SOLUTION INTRAVASCULAR
Status: DISCONTINUED
Start: 2023-02-09 | End: 2023-02-09 | Stop reason: HOSPADM

## 2023-02-09 RX ORDER — ONDANSETRON 2 MG/ML
4 INJECTION INTRAMUSCULAR; INTRAVENOUS
Status: COMPLETED | OUTPATIENT
Start: 2023-02-09 | End: 2023-02-09

## 2023-02-09 RX ORDER — INSULIN LISPRO 100 [IU]/ML
INJECTION, SOLUTION INTRAVENOUS; SUBCUTANEOUS ONCE
Status: DISCONTINUED | OUTPATIENT
Start: 2023-02-09 | End: 2023-02-09 | Stop reason: HOSPADM

## 2023-02-09 RX ORDER — HEPARIN SODIUM 200 [USP'U]/100ML
INJECTION, SOLUTION INTRAVENOUS
Status: DISCONTINUED
Start: 2023-02-09 | End: 2023-02-09 | Stop reason: HOSPADM

## 2023-02-09 RX ORDER — ONDANSETRON 2 MG/ML
4 INJECTION INTRAMUSCULAR; INTRAVENOUS
Status: DISCONTINUED | OUTPATIENT
Start: 2023-02-09 | End: 2023-02-09 | Stop reason: HOSPADM

## 2023-02-09 RX ORDER — ONDANSETRON 2 MG/ML
INJECTION INTRAMUSCULAR; INTRAVENOUS AS NEEDED
Status: DISCONTINUED | OUTPATIENT
Start: 2023-02-09 | End: 2023-02-09 | Stop reason: HOSPADM

## 2023-02-09 RX ORDER — HEPARIN SODIUM 200 [USP'U]/100ML
INJECTION, SOLUTION INTRAVENOUS
Status: DISCONTINUED | OUTPATIENT
Start: 2023-02-09 | End: 2023-02-09 | Stop reason: HOSPADM

## 2023-02-09 RX ORDER — MORPHINE SULFATE 2 MG/ML
2 INJECTION, SOLUTION INTRAMUSCULAR; INTRAVENOUS
Status: COMPLETED | OUTPATIENT
Start: 2023-02-09 | End: 2023-02-09

## 2023-02-09 RX ORDER — PHENYLEPHRINE HCL IN 0.9% NACL 1 MG/10 ML
SYRINGE (ML) INTRAVENOUS AS NEEDED
Status: DISCONTINUED | OUTPATIENT
Start: 2023-02-09 | End: 2023-02-09 | Stop reason: HOSPADM

## 2023-02-09 RX ADMIN — Medication 100 MCG: at 13:30

## 2023-02-09 RX ADMIN — Medication 100 MCG: at 12:35

## 2023-02-09 RX ADMIN — FENTANYL CITRATE 50 MCG: 50 INJECTION INTRAMUSCULAR; INTRAVENOUS at 12:50

## 2023-02-09 RX ADMIN — Medication 100 MCG: at 14:39

## 2023-02-09 RX ADMIN — HYDROMORPHONE HYDROCHLORIDE 1 MG: 2 INJECTION, SOLUTION INTRAMUSCULAR; INTRAVENOUS; SUBCUTANEOUS at 14:18

## 2023-02-09 RX ADMIN — ONDANSETRON 4 MG: 2 INJECTION INTRAMUSCULAR; INTRAVENOUS at 14:47

## 2023-02-09 RX ADMIN — SODIUM CHLORIDE 25 ML/HR: 9 INJECTION, SOLUTION INTRAVENOUS at 12:16

## 2023-02-09 RX ADMIN — PROPOFOL 50 MG: 10 INJECTION, EMULSION INTRAVENOUS at 12:50

## 2023-02-09 RX ADMIN — ONDANSETRON 4 MG: 2 INJECTION INTRAMUSCULAR; INTRAVENOUS at 19:37

## 2023-02-09 RX ADMIN — HYDROCODONE BITARTRATE AND ACETAMINOPHEN 1 TABLET: 5; 325 TABLET ORAL at 21:59

## 2023-02-09 RX ADMIN — PROPOFOL 150 MG: 10 INJECTION, EMULSION INTRAVENOUS at 12:31

## 2023-02-09 RX ADMIN — MORPHINE SULFATE 2 MG: 2 INJECTION, SOLUTION INTRAMUSCULAR; INTRAVENOUS at 19:36

## 2023-02-09 RX ADMIN — Medication 100 MCG: at 13:40

## 2023-02-09 RX ADMIN — HEPARIN SODIUM 3000 UNITS: 1000 INJECTION, SOLUTION INTRAVENOUS; SUBCUTANEOUS at 13:39

## 2023-02-09 RX ADMIN — SODIUM CHLORIDE 25 ML/HR: 9 INJECTION, SOLUTION INTRAVENOUS at 08:50

## 2023-02-09 RX ADMIN — FENTANYL CITRATE 50 MCG: 50 INJECTION INTRAMUSCULAR; INTRAVENOUS at 12:23

## 2023-02-09 RX ADMIN — FENTANYL CITRATE 100 MCG: 50 INJECTION INTRAMUSCULAR; INTRAVENOUS at 13:14

## 2023-02-09 RX ADMIN — Medication 100 MCG: at 12:50

## 2023-02-09 RX ADMIN — LIDOCAINE HYDROCHLORIDE 60 MG: 20 INJECTION, SOLUTION EPIDURAL; INFILTRATION; INTRACAUDAL; PERINEURAL at 12:31

## 2023-02-09 RX ADMIN — Medication 200 MCG: at 14:23

## 2023-02-09 RX ADMIN — FAMOTIDINE 20 MG: 20 TABLET, FILM COATED ORAL at 09:10

## 2023-02-09 NOTE — PERIOP NOTES
Assumed care of pt from [facility]. Pt lying on stretcher. Denies pain or discomforts at this time.

## 2023-02-09 NOTE — Clinical Note
Thank you. Will retry next week  ----- Message -----  From: Iris Greene RN  Sent: 2/9/2023   4:01 PM EST  To: Carlos Noland OT  Subject: RE:                                                She went to have a shunt placed today.     ----- Message -----  From: Debora Claire OT  Sent: 2/9/2023   2:30 PM EST  To: Mikey Steele MD, Delfina Garcia MD, *      2nd attempt made today to schedule the home health OT evaluation for 2/10/23.  left requesting return call to [phone]. Will make one last attempt next week.

## 2023-02-09 NOTE — ANESTHESIA POSTPROCEDURE EVALUATION
Procedure(s):  RIGHT UPPER EXTREMITY SUPERFICATION ARTERIO VENOUS FISTULA REVISION WITH BALLOON  ANGIOPLASTY. MAC, general - backup    Anesthesia Post Evaluation      Multimodal analgesia: multimodal analgesia used between 6 hours prior to anesthesia start to PACU discharge  Patient location during evaluation: PACU  Patient participation: complete - patient participated  Level of consciousness: sleepy but conscious  Pain management: adequate  Airway patency: patent  Anesthetic complications: no  Cardiovascular status: acceptable  Respiratory status: acceptable  Hydration status: acceptable  Post anesthesia nausea and vomiting:  controlled  Final Post Anesthesia Temperature Assessment:  Normothermia (36.0-37.5 degrees C)      INITIAL Post-op Vital signs:   Vitals Value Taken Time   /78 02/09/23 1612   Temp     Pulse 95 02/09/23 1613   Resp 17 02/09/23 1613   SpO2 98 % 02/09/23 1613   Vitals shown include unvalidated device data.

## 2023-02-09 NOTE — DISCHARGE INSTRUCTIONS
Hemodialysis Access Surgery: What to Expect at [facility]  Hemodialysis is a way to remove wastes from the blood when your kidneys can no longer do the job. It's not a cure, but it can help you live longer and feel better. It's a lifesaving treatment when you have kidney failure. Hemodialysis is often called dialysis. Your doctor created a place (called an access) in your arm for your blood to flow in and out of your body during your dialysis sessions. Your arm will probably be bruised and swollen. It may hurt. The cut (incision) may bleed. The pain and bleeding will get better over several days. You will probably need only over-the-counter pain medicine. You can reduce swelling by propping up your arm on 1 or 2 pillows and keeping your elbow straight. You will have stitches. These may dissolve on their own, or your doctor will tell you when to come in to have them removed. You should also be able to return to work in a few days. You may feel some coolness or numbness in your hand. These feelings usually go away in a few weeks. Your doctor may suggest squeezing a soft object. This will strengthen your access and may make hemodialysis faster and easier. You should always be able to feel blood rushing through the fistula or graft. It feels like a slight vibration when you put your fingers on the skin over the fistula or graft. This feeling is called a thrill or a pulse. This care sheet gives you a general idea about how long it will take for you to recover. But each person recovers at a different pace. Follow the steps below to get better as quickly as possible. How can you care for yourself at home? Activity    Rest when you feel tired. Getting enough sleep will help you recover. Do not lie on or sleep on the arm with the access. Avoid activities such as washing windows or gardening that put stress on the arm with the access.      You may use your arm, but do not lift anything that weighs more than about 15 pounds. This may include a child, heavy grocery bags, a heavy briefcase or backpack, cat litter or dog food bags, or a vacuum . You can shower, but keep the access dry for the first 2 days. Cover the area with a plastic bag to keep it dry. Do not soak or scrub the incision until it has healed. Wear an arm guard to protect the area if you play sports or work with your arms. You may drive when your doctor says it is okay. This is usually in 1 to 2 days. Most people are able to return to work about 1 or 2 days after surgery. Diet    Follow an eating plan that is good for your kidneys. A registered dietitian can help you make a meal plan that is right for you. You may need to limit protein, salt, fluids, and certain foods. Medicines    Your doctor will tell you if and when you can restart your medicines. You will also be given instructions about taking any new medicines. If you stopped taking aspirin or some other blood thinner, your doctor will tell you when to start taking it again. Take pain medicines exactly as directed. If the doctor gave you a prescription medicine for pain, take it as prescribed. If you are not taking a prescription pain medicine, ask your doctor if you can take acetaminophen (Tylenol). Do not take ibuprofen (Advil, Motrin) or naproxen (Aleve), or similar medicines, unless your doctor tells you to. They may make chronic kidney disease worse. Do not take two or more pain medicines at the same time unless the doctor told you to. Many pain medicines have acetaminophen, which is Tylenol. Too much acetaminophen (Tylenol) can be harmful. If you think your pain medicine is making you sick to your stomach: Take your medicine after meals (unless your doctor has told you not to). Ask your doctor for a different pain medicine. If your doctor prescribed antibiotics, take them as directed. Do not stop taking them just because you feel better. You need to take the full course of antibiotics. Incision care    Keep the area dry for 2 days. After 2 days, wash the area with soap and water every day, and always before dialysis. Do not soak or scrub the incision until it has healed. If you have a bandage, change it every day or as your doctor recommends. Your doctor will tell you when you can remove it. Exercise    Squeeze a soft ball or other object as your doctor tells you. This will help blood flow through the access and help prevent blood clots. Elevation    Prop up the sore arm on a pillow anytime you sit or lie down during the next 3 days. Try to keep it above the level of your heart. This will help reduce swelling. Other instructions    Every day, check your access for a pulse or thrill in the fistula or graft area. A thrill is a vibration. To feel a pulse or thrill, place the first two fingers of your hand over the access. Do not bump your arm. Do not wear tight clothing, jewelry, or anything else that may squeeze the access. Use your other arm to have blood drawn or blood pressure taken. Do not put cream or lotion on or near the access. Make sure all doctors you deal with know that you have a vascular access. Follow-up care is a key part of your treatment and safety. Be sure to make and go to all appointments, and call your doctor if you are having problems. It's also a good idea to know your test results and keep a list of the medicines you take. When should you call for help? Call 911 anytime you think you may need emergency care. For example, call if:    You passed out (lost consciousness). You have chest pain, are short of breath, or cough up blood. Call your doctor now or seek immediate medical care if:    Your hand or arm is cold or dark-colored. You have no pulse in your access. You have nausea or you vomit for more than four hours.      You have pain that does not get better after you take pain medicine. You have loose stitches, or your incision comes open. You are bleeding from the incision. You have signs of infection, such as: Increased pain, swelling, warmth, or redness. Red streaks leading from the area. Pus draining from the area. A fever. You have signs of a blood clot in your leg (called a deep vein thrombosis), such as:  Pain in your calf, back of the knee, thigh, or groin. Redness or swelling in your leg. Watch closely for changes in your health, and be sure to contact your doctor if you have any problems. Where can you learn more? Go to http://www.gray.com/  Enter P616 in the search box to learn more about \"Hemodialysis Access Surgery: What to Expect at Home. \"  Current as of: May 4, 2022               Content Version: 13.4  © 8741-5674 [employer]. Care instructions adapted under license by [employer] (which disclaims liability or warranty for this information). If you have questions about a medical condition or this instruction, always ask your healthcare professional. [place] [address] any warranty or liability for your use of this information. DISCHARGE SUMMARY from Nurse    PATIENT INSTRUCTIONS:    After general anesthesia or intravenous sedation, for 24 hours or while taking prescription Narcotics:  Limit your activities  Do not drive and operate hazardous machinery  Do not make important personal or business decisions  Do  not drink alcoholic beverages  If you have not urinated within 8 hours after discharge, please contact your surgeon on call.     Report the following to your surgeon:  Excessive pain, swelling, redness or odor of or around the surgical area  Temperature over 100.5  Nausea and vomiting lasting longer than 4 hours or if unable to take medications  Any signs of decreased circulation or nerve impairment to extremity: change in color, persistent  numbness, tingling, coldness or increase pain  Any questions    What to do at Home:  Recommended activity: Activity as tolerated,       These are general instructions for a healthy lifestyle:    No smoking/ No tobacco products/ Avoid exposure to second hand smoke  Surgeon General's Warning:  Quitting smoking now greatly reduces serious risk to your health. Obesity, smoking, and sedentary lifestyle greatly increases your risk for illness    A healthy diet, regular physical exercise & weight monitoring are important for maintaining a healthy lifestyle    You may be retaining fluid if you have a history of heart failure or if you experience any of the following symptoms:  Weight gain of 3 pounds or more overnight or 5 pounds in a week, increased swelling in our hands or feet or shortness of breath while lying flat in bed. Please call your doctor as soon as you notice any of these symptoms; do not wait until your next office visit. The discharge information has been reviewed with the patient and spouse. The patient and spouse verbalized understanding. Discharge medications reviewed with the patient and spouse and appropriate educational materials and side effects teaching were provided.   ___________________________________________________________________________________________________________________________________

## 2023-02-09 NOTE — DISCHARGE SUMMARY
Discharge Summary     Patient: Dharmesh Sinha MRN: [id number]  SSN: [id number]    YOB: 1950  Age: 81 y.o. Sex: female       Admit Date: 2/9/2023    Discharge Date: 2/9/2023      Admission Diagnoses: ESRD (end stage renal disease) ([facility] [N18.6]; Steal syndrome as complication of dialysis access, initial encounter [facility]) [T82.898A]    Discharge Diagnoses:   Problem List as of 2/9/2023 Date Reviewed: 2/8/2023            Codes Class Noted - Resolved    Sinus tachycardia ICD-10-CM: R00.0  ICD-9-CM: 427.89  1/13/2023 - Present        Chronic heart failure with preserved ejection fraction ([facility] ICD-10-CM: I50.32  ICD-9-CM: 428.9  1/13/2023 - Present        Anemia due to chronic kidney disease, on chronic dialysis ([facility] ICD-10-CM: N18.6, D63.1, Z99.2  ICD-9-CM: 585.6, 285.21, V45.11  1/13/2023 - Present        TIA (transient ischemic attack) ICD-10-CM: G45.9  ICD-9-CM: 435.9  12/9/2022 - Present        Dizziness ICD-10-CM: R42  ICD-9-CM: 780.4  12/9/2022 - Present        Stenosis of both vertebral arteries ICD-10-CM: I65.03  ICD-9-CM: 433.20  12/9/2022 - Present        CVA (cerebral vascular accident) [facility]) ICD-10-CM: I63.9  ICD-9-CM: 434.91  12/8/2022 - Present        ESRD (end stage renal disease) on dialysis [facility]) ICD-10-CM: N18.6, Z99.2  ICD-9-CM: 585.6, V45.11  12/8/2022 - Present        CHF (congestive heart failure) (HCC) ICD-10-CM: I50.9  ICD-9-CM: 428.0  9/9/2022 - Present        Pneumonia due to COVID-19 virus ICD-10-CM: U07.1, J12.82  ICD-9-CM: 480.8, 079.89  8/8/2022 - Present        Acute on chronic diastolic heart failure ([facility] ICD-10-CM: I50.33  ICD-9-CM: 428.33  8/8/2022 - Present        Stage 3b chronic kidney disease ([facility] ICD-10-CM: L52.46  ICD-9-CM: 585.3  8/8/2022 - Present        Pulmonary hypertension ([facility] ICD-10-CM: I27.20  ICD-9-CM: 416.8  7/8/2022 - Present        Acute exacerbation of CHF (congestive heart failure) ([facility] ICD-10-CM: I50.9  ICD-9-CM: 428.0  7/8/2022 - Present History of myocardial infarction (Chronic) ICD-10-CM: I25.2  ICD-9-CM: 721  6/27/2022 - Present        Acute hyperkalemia ICD-10-CM: E87.5  ICD-9-CM: 276.7  6/26/2022 - Present        Breast cancer (HCC) (Chronic) ICD-10-CM: C50.919  ICD-9-CM: 174.9  6/26/2022 - Present        Rectal cancer (HCC) (Chronic) ICD-10-CM: C20  ICD-9-CM: 154.1  6/26/2022 - Present        Hyperkalemia ICD-10-CM: E87.5  ICD-9-CM: 276.7  3/7/2022 - Present        Stage 1 acute kidney injury ([facility] ICD-10-CM: N17.9  ICD-9-CM: 584.9  12/18/2021 - Present        Hypotension ICD-10-CM: I95.9  ICD-9-CM: 458.9  3/19/2021 - Present        Sepsis ([facility] ICD-10-CM: A41.9  ICD-9-CM: 038.9, 995.91  3/18/2021 - Present        Nonrheumatic mitral valve regurgitation ICD-10-CM: I34.0  ICD-9-CM: 424.0  8/13/2020 - Present        Chronic systolic congestive heart failure (HCC) ICD-10-CM: I50.22  ICD-9-CM: 428.22, 428.0  11/7/2019 - Present        Pulmonary edema cardiac cause ([facility] ICD-10-CM: I50.1  ICD-9-CM: 514  10/19/2019 - Present        COPD with acute exacerbation ([facility] ICD-10-CM: J44.1  ICD-9-CM: 491.21  10/19/2019 - Present        Respiratory failure requiring intubation ([facility] ICD-10-CM: J96.90  ICD-9-CM: 518.81  10/19/2019 - Present        Syncope ICD-10-CM: R55  ICD-9-CM: 780.2  10/18/2019 - Present        Flash pulmonary edema ([facility] ICD-10-CM: J81.0  ICD-9-CM: 518.4  10/18/2019 - Present        Elevated d-dimer ICD-10-CM: R79.89  ICD-9-CM: 790.92  10/18/2019 - Present        Acute respiratory failure with hypoxia (HCC) ICD-10-CM: J96.01  ICD-9-CM: 518.81  10/18/2019 - Present        CAD (coronary artery disease) (Chronic) ICD-10-CM: I25.10  ICD-9-CM: 414.00  10/18/2019 - Present    Overview Signed 10/24/2019 10:43 AM by Omar Latham     Added automatically from request for surgery [id number]             Ischemic cardiomyopathy ICD-10-CM: I25.5  ICD-9-CM: 414.8  9/9/2019 - Present        Type 2 diabetes mellitus with hyperglycemia ([facility] ICD-10-CM: E11.65  ICD-9-CM: 250.00  8/26/2019 - Present        TESSA on CPAP ICD-10-CM: G47.33, Z99.89  ICD-9-CM: 327.23, V46.8  8/26/2019 - Present        Type 2 diabetes mellitus with hyperglycemia, with long-term current use of insulin (HCC) ICD-10-CM: E11.65, Z79.4  ICD-9-CM: 250.00, 790.29, V58.67  7/12/2018 - Present        Severe obesity (BMI 35.0-39. 9) with comorbidity ([facility] ICD-10-CM: E66.01  ICD-9-CM: 278.01  7/12/2018 - Present        SOB (shortness of breath) ICD-10-CM: R06.02  ICD-9-CM: 786.05  6/9/2018 - Present        Coronary artery disease involving native coronary artery with unstable angina pectoris (HCC) ICD-10-CM: I25.110  ICD-9-CM: 414.01, 411.1  6/9/2018 - Present        Pulmonary edema ICD-10-CM: J81.1  ICD-9-CM: 036  6/8/2018 - Present        Coronary artery disease involving native coronary artery of native heart without angina pectoris ICD-10-CM: I25.10  ICD-9-CM: 414.01  5/31/2018 - Present        STEMI (ST elevation myocardial infarction) ([facility] ICD-10-CM: I21.3  ICD-9-CM: 410.90  5/20/2018 - Present        Acute pulmonary edema ([facility] ICD-10-CM: J81.0  ICD-9-CM: 518.4  5/20/2018 - Present        Caregiver stress ICD-10-CM: Z63.6  ICD-9-CM: V61.49  4/26/2017 - Present        Bilateral shoulder pain ICD-10-CM: M25.511, M25.512  ICD-9-CM: 719.41  9/27/2016 - Present        Elevated alkaline phosphatase level ICD-10-CM: R74.8  ICD-9-CM: 790.5  9/27/2016 - Present        Chronic obstructive pulmonary disease ([facility] ICD-10-CM: J44.9  ICD-9-CM: 496  5/17/2016 - Present        Compliance with medication regimen ICD-10-CM: Z91.89  ICD-9-CM: V49.89  5/17/2016 - Present        Chronic pain of both knees ICD-10-CM: M25.561, M25.562, G89.29  ICD-9-CM: 719.46, 338.29  4/13/2016 - Present        Chronic pain syndrome (Chronic) ICD-10-CM: G89.4  ICD-9-CM: 338.4  4/13/2016 - Present        Primary osteoarthritis of both knees ICD-10-CM: M17.0  ICD-9-CM: 715.16  4/13/2016 - Present        Iron deficiency anemia ICD-10-CM: D50.9  ICD-9-CM: 280.9  10/22/2015 - Present        Decreased GFR ICD-10-CM: R94.4  ICD-9-CM: 794.4  10/22/2015 - Present        Dyslipidemia, goal LDL below 70 (Chronic) ICD-10-CM: E78.5  ICD-9-CM: 272.4  10/22/2015 - Present        Former smoker (Chronic) ICD-10-CM: Y58.233  ICD-9-CM: V15.82  10/22/2015 - Present        Osteoarthritis of both knees ICD-10-CM: M17.0  ICD-9-CM: 715.96  Unknown - Present        Essential hypertension (Chronic) ICD-10-CM: I10  ICD-9-CM: 401.9  9/17/2015 - Present        Environmental allergies ICD-10-CM: Z91.09  ICD-9-CM: V15.09  4/1/2015 - Present        Cardiomyopathy, dilated (HCC) (Chronic) ICD-10-CM: I42.0  ICD-9-CM: 425.4  12/31/2014 - Present        RESOLVED: Chronic respiratory failure with hypoxia (HCC) (Chronic) ICD-10-CM: J96.11  ICD-9-CM: 518.83, 799.02  6/27/2022 - 8/8/2022        RESOLVED: Orthostatic hypotension ICD-10-CM: I95.1  ICD-9-CM: 458.0  5/17/2016 - 9/27/2016        RESOLVED: Abnormal WBC count ICD-10-CM: D72.9  ICD-9-CM: 288.9  5/17/2016 - 9/27/2016        RESOLVED: Encounter for long-term (current) use of medications ICD-10-CM: Z79.899  ICD-9-CM: V58.69  4/13/2016 - 9/27/2016        RESOLVED: Noncompliance with medications ICD-10-CM: Z91.14  ICD-9-CM: V15.81  2/16/2016 - 9/27/2016        RESOLVED: Diabetes mellitus type 2, insulin dependent (HCC) (Chronic) ICD-10-CM: E11.9, Z79.4  ICD-9-CM: 250.00, V58.67  7/2/2015 - 12/9/2022        RESOLVED: Arthritis, degenerative ICD-10-CM: M19.90  ICD-9-CM: 715.90  7/2/2015 - 9/27/2016        RESOLVED: Opiate use ICD-10-CM: F11.90  ICD-9-CM: 305.50  4/10/2015 - 9/27/2016        RESOLVED: Marijuana abuse ICD-10-CM: F12.10  ICD-9-CM: 305.20  4/10/2015 - 9/27/2016        RESOLVED: Vitamin D deficiency ICD-10-CM: E55.9  ICD-9-CM: 268.9  1/22/2015 - 9/27/2016        RESOLVED: Cat bite ICD-10-CM: W55. 01XA  ICD-9-CM: 879.8, E906.3  6/26/2014 - 2/19/2015        RESOLVED: Cellulitis ICD-10-CM: L03.90  ICD-9-CM: 682.9 6/26/2014 - 2/19/2015            Discharge Condition: Stable    Hospital Course: Admitted day of surgery for superficialization of right arm fistula. Uncomplicated surgical procedure and recovery. Discharged to [address] [place] [address] and presented to ED for dialysis. Consults: None    Significant Diagnostic Studies: None    Disposition: home    Discharge Medications:   Current Discharge Medication List        CONTINUE these medications which have NOT CHANGED    Details   doxycycline (VIBRA-TABS) 100 mg tablet Take 1 Tablet by mouth two (2) times a day for 10 days. Qty: 20 Tablet, Refills: 0      albuterol (PROVENTIL HFA, VENTOLIN HFA, PROAIR HFA) 90 mcg/actuation inhaler Take 2 Puffs by inhalation every six (6) hours as needed for Wheezing. Qty: 18 g, Refills: 0      guaiFENesin ER (Mucinex) 600 mg ER tablet Take 1 Tablet by mouth two (2) times a day. Qty: 20 Tablet, Refills: 0      docusate sodium (COLACE) 100 mg capsule Take 1 Capsule by mouth two (2) times a day. Qty: 60 Capsule, Refills: 0      atorvastatin (LIPITOR) 80 mg tablet Take 80 mg by mouth nightly. metoprolol tartrate (LOPRESSOR) 25 mg tablet Take 1 Tablet by mouth two (2) times a day. Qty: 60 Tablet, Refills: 0      gabapentin (NEURONTIN) 400 mg capsule Take 400 mg by mouth in the morning. albuterol-ipratropium (DUO-NEB) 2.5 mg-0.5 mg/3 ml nebu 3 mL by Nebulization route every six (6) hours as needed for Wheezing. Qty: 30 Nebule, Refills: 0      aspirin 81 mg chewable tablet Take 2 Tabs by mouth daily. Qty: 60 Tab, Refills: 0    Associated Diagnoses: Coronary artery disease involving native coronary artery of native heart without angina pectoris      umeclidinium-vilanterol (ANORO ELLIPTA) 62.5-25 mcg/actuation inhaler Take 1 Puff by inhalation daily. For COPD  Qty: 3 Inhaler, Refills: 3    Comments: 9/22/15 Dr Ewa Hale prescribed.  Will order via [facility] if available  Associated Diagnoses: Chronic obstructive pulmonary disease, unspecified COPD type ([facility]      !! OTHER Gentamicin to be given with hemodialysis through January 31, 2023. Nephrology to arrange  Qty: 1 Each, Refills: 0      !! OTHER Incentive spirometry-use as directed  Qty: 1 Each, Refills: 0      !! OTHER Graded compression stockings-bilateral lower extremities-use as directed  Qty: 1 Each, Refills: 0      OneTouch Ultra Test strip USE TO TEST BLOOD SUGAR TWICE DAILY       ! ! - Potential duplicate medications found. Please discuss with provider. Activity: Activity as tolerated  Diet: Resume previous diet  Wound Care: Keep wound clean and dry    Follow-up Appointments   Procedures    FOLLOW UP VISIT Appointment in: Two Weeks     Standing Status:   Standing     Number of Occurrences:   1     Order Specific Question:   Appointment in     Answer:    Two Weeks       Signed By: Antoinette Mejia MD     February 9, 2023

## 2023-02-09 NOTE — PROCEDURES
OPERATIVE NOTE    DATE OF PROCEDURE: 2/9/2023    SURGEON: Jillian Alvarado MD    PREOPERATIVE DIAGNOSIS: Deep RUE fistula, slow to mature  POSTOPERATIVE DIAGNOSIS: same    PROCEDURE PERFORMED: Angioplasty; superficialization of RUE brachio-cephalic fistula    ANESTHESIA: general anesthesia    EBL: less than 100     BLOOD ADMINISTERED: None    DRAINS/TUBES: None    IMPLANTS: None    COMPLICATIONS: None    FINDINGS: Patent right brachio-cephalic fistula with one large side branch and single area of stenosis    OPERATIVE INDICATIONS: This is a 79y/o female with dialysis-dependent ESRD. She underwent right arm brachio-cephalic fistula creation approximately 2mos ago. Recent duplex imaging revealed an area of stenosis as well as a very deep fistula (>1.5cm). DESCRIPTION OF PROCEDURE:  After obtaining informed consent, the patient was brought into the OR and placed on the OR table in the supine position. General anesthesia was induced and an LMA was placed without difficulty. The fistula was evaluated and marked with US. The right arm was then prepped and draped in the usual, sterile fashion. A final time out was performed and the correct patient, procedure, and laterality were confirmed. An incision was made over the previously marked fistula at approximately its midpoint. Dissection continued through subcutaneous tissues with a combination of electrocautery and blunt dissection. The fistula was identified and dissected proximally while extending the skin incision. Dissection then continued distally toward the anastomosis in a similar fashion. A large branch was identified during this process and initially preserved. All other, smaller branches were individually ligated and divided. There was an area of relative stenosis noted between this branch and the anastomosis. Dissection proceeded toward the anastomosis until a larger caliber, more matured segment was encountered.   It was circumferentially dissected at this point, and a vessel loop Townsend tie was placed around it. The patient was systemically heparinized. At this point, the fistula was accessed near its midpoint in a retrograde fashion with a 19ga needle. A wire was passed retrograde and a 4Fr sheath was advanced in this direction toward the stenosed area. An 0.014 wire was advanced past this stenotic area. A 5mm plain angioplasty balloon was inflated in this stenotic area with a good post-angioplasty result. Proximal and distal control were obtained with the previously placed Townsend tie and a bulldog clamp. The sheath, wire, and balloon were removed, and the arteriotomy was closed with 6-0 Prolene. The large branch was then ligated and divided. The entire exposed length of the fistula was then mobilized and freed. A small subcutaneous flap was created. The fistula was elevated in the wound, and the soft tissues were closed beneath it with 2-0 Vicryl suture. The fistula was situated in the subcutaneous space and was without tension, twists, or kinks. The skin was then approximated with 3-0 Vicryl deep dermal sutures, and the incision was closed with a running subcuticular 4-0 Monocryl. Doppler interrogation of the fistula revealed good pulsatile low-resistance flow. There was a good, multiphasic Doppler signal in the distal radial pulse consistent with her pre-op exam.  Dermabond was applied to the incision. The patient was then awakened and the LMA removed without difficulty. She was taken to the PACU in stable condition for recovery. She tolerated the procedure well, and there were no complications.     Jackelyn Serna MD  Vascular Surgery

## 2023-02-09 NOTE — ED NOTES
Spoke with James Allen in dialysis who stated that patient would get treated by on-call dialysis nurse later tonight but unsure of what time.

## 2023-02-09 NOTE — Clinical Note
She went to have a shunt placed today.     ----- Message -----  From: Caleb Garcia, OT  Sent: 2/9/2023   2:30 PM EST  To: Carolin Castillo MD, Shobha Farias MD, *      2nd attempt made today to schedule the home health OT evaluation for 2/10/23.  left requesting return call to [phone]. Will make one last attempt next week.

## 2023-02-09 NOTE — ANESTHESIA PREPROCEDURE EVALUATION
Anesthetic History               Review of Systems / Medical History  Patient summary reviewed, nursing notes reviewed and pertinent labs reviewed    Pulmonary    COPD               Neuro/Psych             Comments: 12/2022  Right internal carotid artery demonstrated mild (<50%) plaque without evidence of significant stenosis. Left internal carotid artery demonstrated moderate (50-69%) stenosis with calcific plaque present. Right external carotid artery demonstrated elevated velocities suggestive of a >50% stenosis.  Cardiovascular    Hypertension          CAD, cardiac stents and hyperlipidemia      Comments: 06/2022 ECHO  estimated EF of 50 - 55%  Mitral Valve: Mild regurgitation      GI/Hepatic/Renal         Renal disease: ESRD and dialysis       Endo/Other    Diabetes    Obesity and anemia     Other Findings              Physical Exam    Airway  Mallampati: II  TM Distance: 4 - 6 cm  Neck ROM: decreased range of motion   Mouth opening: Diminished (comment)     Cardiovascular    Rhythm: irregular  Rate: normal         Dental    Dentition: Poor dentition     Pulmonary      Decreased breath sounds: bilateral           Abdominal  GI exam deferred       Other Findings            Anesthetic Plan    ASA: 4  Anesthesia type: MAC and general - backup            Anesthetic plan and risks discussed with: Patient

## 2023-02-09 NOTE — PERIOP NOTES
This nurse spoke with Dayton Reddy in dialysis regarding pts need for a dialysis run today. She stated they are aware. I have also spoken with Mr. Levern Bence and given discharge instructions over the phone. [address], RN charge nurse in ER given pt update.

## 2023-02-09 NOTE — CASE COMMUNICATION
2nd attempt made today to schedule the home health OT evaluation for 2/10/23. VM left requesting return call to [phone]. Will make one last attempt next week.

## 2023-02-09 NOTE — INTERVAL H&P NOTE
Update History & Physical    The Patient's History and Physical of February 8, 2023 was reviewed with the patient and I examined the patient. There was no change. The surgical site was confirmed by the patient and me. Plan:  The risk, benefits, expected outcome, and alternative to the recommended procedure have been discussed with the patient. Patient understands and wants to proceed with the procedure.     Electronically signed by Auther Homans, MD on 2/9/2023 at 10:51 AM

## 2023-02-10 ENCOUNTER — TELEPHONE (OUTPATIENT)
Dept: SURGERY | Age: 73
End: 2023-02-10

## 2023-02-10 ENCOUNTER — HOME CARE VISIT (OUTPATIENT)
Dept: SCHEDULING | Facility: HOME HEALTH | Age: 73
End: 2023-02-10
Payer: MEDICARE

## 2023-02-10 VITALS
RESPIRATION RATE: 16 BRPM | TEMPERATURE: 98.7 F | HEART RATE: 86 BPM | SYSTOLIC BLOOD PRESSURE: 130 MMHG | OXYGEN SATURATION: 97 % | DIASTOLIC BLOOD PRESSURE: 60 MMHG

## 2023-02-10 VITALS
DIASTOLIC BLOOD PRESSURE: 72 MMHG | SYSTOLIC BLOOD PRESSURE: 138 MMHG | HEART RATE: 78 BPM | OXYGEN SATURATION: 98 % | RESPIRATION RATE: 17 BRPM | TEMPERATURE: 97.8 F

## 2023-02-10 PROCEDURE — G0299 HHS/HOSPICE OF RN EA 15 MIN: HCPCS

## 2023-02-10 RX ORDER — HYDROCODONE BITARTRATE AND ACETAMINOPHEN 5; 325 MG/1; MG/1
1 TABLET ORAL
Qty: 6 TABLET | Refills: 0 | Status: SHIPPED | OUTPATIENT
Start: 2023-02-10 | End: 2023-02-13

## 2023-02-10 NOTE — ED PROVIDER NOTES
EMERGENCY DEPARTMENT HISTORY AND PHYSICAL EXAM    7:27 PM      Date: 2/9/2023  Patient Name: Elena Salcedo    History of Presenting Illness     Chief Complaint   Patient presents with    Vascular Access Problem         History Provided By: Patient  Location/Duration/Severity/Modifying factors   Patient is a 63-year-old -American female that presents to the emergency room for dialysis. Patient was seen today for a AV fistula surgery. Patient is due for dialysis. She was unable to go directly to dialysis so they sent her here for registration. Dialysis is not ready for her they have 1 patient ahead of her and after they will see her. After which time she will be discharged home. No complaints at this time. PCP: Enrico Diaz MD    Current Outpatient Medications   Medication Sig Dispense Refill    doxycycline (VIBRA-TABS) 100 mg tablet Take 1 Tablet by mouth two (2) times a day for 10 days. 20 Tablet 0    albuterol (PROVENTIL HFA, VENTOLIN HFA, PROAIR HFA) 90 mcg/actuation inhaler Take 2 Puffs by inhalation every six (6) hours as needed for Wheezing. 18 g 0    guaiFENesin ER (Mucinex) 600 mg ER tablet Take 1 Tablet by mouth two (2) times a day. (Patient taking differently: Take 600 mg by mouth as needed.) 20 Tablet 0    docusate sodium (COLACE) 100 mg capsule Take 1 Capsule by mouth two (2) times a day. (Patient taking differently: Take 100 mg by mouth daily.) 60 Capsule 0    OTHER Gentamicin to be given with hemodialysis through January 31, 2023. Nephrology to arrange 1 Each 0    OTHER Incentive spirometry-use as directed 1 Each 0    OTHER Graded compression stockings-bilateral lower extremities-use as directed 1 Each 0    atorvastatin (LIPITOR) 80 mg tablet Take 80 mg by mouth nightly. OneTouch Ultra Test strip USE TO TEST BLOOD SUGAR TWICE DAILY      metoprolol tartrate (LOPRESSOR) 25 mg tablet Take 1 Tablet by mouth two (2) times a day.  60 Tablet 0    gabapentin (NEURONTIN) 400 mg capsule Take 400 mg by mouth in the morning. albuterol-ipratropium (DUO-NEB) 2.5 mg-0.5 mg/3 ml nebu 3 mL by Nebulization route every six (6) hours as needed for Wheezing. 30 Nebule 0    aspirin 81 mg chewable tablet Take 2 Tabs by mouth daily. 60 Tab 0    umeclidinium-vilanterol (ANORO ELLIPTA) 62.5-25 mcg/actuation inhaler Take 1 Puff by inhalation daily. For COPD 3 Inhaler 3       Past History     Past Medical History:  Past Medical History:   Diagnosis Date    Abnormal WBC count 05/17/2016    Anemia     Bilateral shoulder pain 09/27/2016    Chondromalacia of both patellae     Chronic heart failure with preserved ejection fraction ([facility] 01/13/2023    Chronic kidney disease     COPD (chronic obstructive pulmonary disease) ([facility] 09/2015    mild-mod dz; Dr Marin Valenzuela    Diabetes [facility]) 2013    Diabetic eye exam [facility]) 2016    Dialysis patient [facility])     T/Th/Sat at [address] [place] [address] [phone]    Dilated cardiomyopathy ([facility]     Dyslipidemia     Gout     Heart attack ([facility] 10/18/2019    Stented    History of echocardiogram 11/14/2014    Mild LVE. EF 40%. Mild, diffuse hypk. Mild LAE. Mild MR.       Hypercholesteremia 01/08/2014    Hypertension 2000    Noncompliance with medications 02/16/2016    Obesity     Orthostatic hypotension 05/17/2016    Osteoarthritis of both knees     Pain management 04/01/2016    Dr. Ana Taylor     Popliteal cyst, bilateral      Sleep apnea     not using cpap    Stage 3b chronic kidney disease [facility] 08/08/2022    Vitamin D deficiency 10/16/2014       Past Surgical History:  Past Surgical History:   Procedure Laterality Date    HX COLOSTOMY  01/18/2023    HX HEART CATHETERIZATION  2019    Coronary stent    HX OTHER SURGICAL  01/18/2023    Exploratory laparotomy, sigmoid colectomy, and descending colostomy    HX TUBAL LIGATION      IR INSERT TUNL CVC W/O PORT OVER 5 YR  09/21/2022       Family History:  Family History   Problem Relation Age of Onset    Diabetes Mother     Hypertension Mother     Hypertension Father     Kidney Disease Maternal Aunt 48        Dialysis       Social History:  Social History     Tobacco Use    Smoking status: Former     Packs/day: 1.00     Years: 52.00     Pack years: 52.00     Types: Cigarettes     Quit date: 5/20/2019     Years since quitting: 3.7     Passive exposure: Never    Smokeless tobacco: Never   Vaping Use    Vaping Use: Never used   Substance Use Topics    Alcohol use: No     Alcohol/week: 0.0 standard drinks    Drug use: Never       Allergies:  No Known Allergies      Review of Systems       Review of Systems   Constitutional: Negative. Negative for chills, diaphoresis and fever. HENT:  Negative for congestion, rhinorrhea and sore throat. Eyes: Negative. Negative for pain, discharge and redness. Respiratory:  Negative for cough, chest tightness, shortness of breath and wheezing. Cardiovascular: Negative. Negative for chest pain. Gastrointestinal: Negative. Negative for abdominal pain, constipation, diarrhea, nausea and vomiting. Genitourinary: Negative. Negative for dysuria, flank pain, frequency, hematuria and urgency. Musculoskeletal: Negative. Negative for back pain and neck pain. Skin: Negative. Negative for rash. Neurological: Negative. Negative for syncope, weakness, numbness and headaches. Psychiatric/Behavioral: Negative. All other systems reviewed and are negative. Physical Exam   Visit Vitals  /66   Pulse 88   Temp 97.2 °F (36.2 °C)   Resp 16   SpO2 97%         Physical Exam  Vitals and nursing note reviewed. Constitutional:       General: She is not in acute distress. Appearance: Normal appearance. She is well-developed. She is not ill-appearing, toxic-appearing or diaphoretic. HENT:      Head: Normocephalic and atraumatic. Mouth/Throat:      Pharynx: No oropharyngeal exudate. Eyes:      General: No scleral icterus.      Conjunctiva/sclera: Conjunctivae normal.      Pupils: Pupils are equal, round, and reactive to light. Neck:      Thyroid: No thyromegaly. Vascular: No hepatojugular reflux or JVD. Trachea: No tracheal deviation. Cardiovascular:      Rate and Rhythm: Normal rate and regular rhythm. Pulses: Normal pulses. Radial pulses are 2+ on the right side and 2+ on the left side. Dorsalis pedis pulses are 2+ on the right side and 2+ on the left side. Heart sounds: Normal heart sounds, S1 normal and S2 normal. No murmur heard. No gallop. No S3 or S4 sounds. Pulmonary:      Effort: Pulmonary effort is normal. No respiratory distress. Breath sounds: Normal breath sounds. No decreased breath sounds, wheezing, rhonchi or rales. Abdominal:      General: Bowel sounds are normal. There is no distension. Palpations: Abdomen is soft. Abdomen is not rigid. There is no mass. Tenderness: There is no abdominal tenderness. There is no guarding or rebound. Negative signs include Grey's sign and McBurney's sign. Musculoskeletal:         General: Normal range of motion. Cervical back: Normal range of motion and neck supple. Lymphadenopathy:      Head:      Right side of head: No submental, submandibular, preauricular or occipital adenopathy. Left side of head: No submental, submandibular, preauricular or occipital adenopathy. Cervical: No cervical adenopathy. Upper Body:      Right upper body: No supraclavicular adenopathy. Left upper body: No supraclavicular adenopathy. Skin:     General: Skin is warm and dry. Findings: No rash. Neurological:      Mental Status: She is alert. She is not disoriented. GCS: GCS eye subscore is 4. GCS verbal subscore is 5. GCS motor subscore is 6. Cranial Nerves: No cranial nerve deficit. Sensory: No sensory deficit. Coordination: Coordination normal.      Gait: Gait normal.      Deep Tendon Reflexes: Reflexes are normal and symmetric.    Psychiatric: Speech: Speech normal.         Behavior: Behavior normal.         Thought Content: Thought content normal.         Judgment: Judgment normal.         Diagnostic Study Results     Labs -  Recent Results (from the past 12 hour(s))   GLUCOSE, POC    Collection Time: 02/09/23  3:06 PM   Result Value Ref Range    Glucose (POC) 112 (H) 70 - 110 mg/dL       Radiologic Studies -   No orders to display         Medical Decision Making   I am the first provider for this patient. I reviewed the vital signs, available nursing notes, past medical history, past surgical history, family history and social history. Vital Signs-Reviewed the patient's vital signs. Records Reviewed: Prior medical records (Time of Review: 7:27 PM)    ED Course: Progress Notes, Reevaluation, and Consults:         Provider Notes (Medical Decision Making):   Medical Decision Making  Patient is a 79-year-old -American female presents for dialysis. Soon as dialysis bed is available patient is to go to dialysis. Patient had a full dialysis. Return to the emergency room. Patient is feeling well. She would like to be discharged home. Risk  Prescription drug management. Procedures          Diagnosis     Clinical Impression:   1. A-V fistula ([address]    2. ESRD needing dialysis [facility])        Disposition: Discharge home     Follow-up Information       Follow up With Specialties Details Why Contact Info    Laureen Garcia MD Internal Medicine Physician In 2 days  [facility]  [phone]               Patient's Medications   Start Taking    No medications on file   Continue Taking    ALBUTEROL (PROVENTIL HFA, VENTOLIN HFA, PROAIR HFA) 90 MCG/ACTUATION INHALER    Take 2 Puffs by inhalation every six (6) hours as needed for Wheezing. ALBUTEROL-IPRATROPIUM (DUO-NEB) 2.5 MG-0.5 MG/3 ML NEBU    3 mL by Nebulization route every six (6) hours as needed for Wheezing.     ASPIRIN 81 MG CHEWABLE TABLET    Take 2 Tabs by mouth daily. ATORVASTATIN (LIPITOR) 80 MG TABLET    Take 80 mg by mouth nightly. DOCUSATE SODIUM (COLACE) 100 MG CAPSULE    Take 1 Capsule by mouth two (2) times a day. DOXYCYCLINE (VIBRA-TABS) 100 MG TABLET    Take 1 Tablet by mouth two (2) times a day for 10 days. GABAPENTIN (NEURONTIN) 400 MG CAPSULE    Take 400 mg by mouth in the morning. GUAIFENESIN ER (MUCINEX) 600 MG ER TABLET    Take 1 Tablet by mouth two (2) times a day. METOPROLOL TARTRATE (LOPRESSOR) 25 MG TABLET    Take 1 Tablet by mouth two (2) times a day. ONETOUCH ULTRA TEST STRIP    USE TO TEST BLOOD SUGAR TWICE DAILY    OTHER    Gentamicin to be given with hemodialysis through January 31, 2023. Nephrology to arrange    OTHER    Incentive spirometry-use as directed    OTHER    Graded compression stockings-bilateral lower extremities-use as directed    UMECLIDINIUM-VILANTEROL (ANORO ELLIPTA) 62.5-25 MCG/ACTUATION INHALER    Take 1 Puff by inhalation daily. For COPD   These Medications have changed    No medications on file   Stop Taking    No medications on file     Disclaimer: Sections of this note are dictated using utilizing voice recognition software. Minor typographical errors may be present. If questions arise, please do not hesitate to contact me or call our department.

## 2023-02-10 NOTE — ED NOTES
dialysis RN called, pt requesting pain medication.    Received verbal order from Dr. Stefania Terry mediation to dialysis for pt

## 2023-02-10 NOTE — TELEPHONE ENCOUNTER
Left voicemail message for MsSamara Teodora Gregorio to contact our office re: GI referral or schedule colonoscopy with Dr. Emmy Hope per Dr. Reagan Byrnes recommendation.

## 2023-02-10 NOTE — DIALYSIS
THONY        ACUTE HEMODIALYSIS FLOW SHEET      HEMODIALYSIS ORDERS: Physician: Kellie Seth     Dialyzer: revaclear   Duration: 2 hr  BFR: 350   DFR: 600   Dialysate:  Temp 36-37*C  K+   2    Ca+  2.5 Na 138 Bicarb 32   Weight: 93.9 kg   Patient Chart [x]     Unable to Obtain []   Dry weight/UF Goal: 1500 Access CVL  Needle Gauge     Heparin []  Bolus      Units    [] Hourly       Units    [x]None      Catheter locking solution heparin   Pre BP:   96/55    Pulse:     88       Respirations: 16  Temperature:   97.1   Labs: Pre        Post:        [x] N/A   Additional Orders(medications, blood products, hypotension management) [x] N/A     [x] Thony Consent Verified     CATHETER ACCESS: []N/A   [x]Right   [x]Left   []IJ     []Fem   [x]chest wall   [] First use X-ray verified     [x]Tunnel                [] Non Tunneled   [x]No S/S infection  []Redness  []Drainage []Cultured []Swelling []Pain   [x]Medical Aseptic Prep Utilized   []Dressing Changed  [] Biopatch  Date:       []Clotted   [x]Patent   Flows: [x]Good  []Poor  []Reversed   If access problem,  notified: []Yes    [x]N/A  Date:                       GENERAL ASSESSMENT:      LUNGS:  Rate  SaO2% [] N/A    [x] Clear  [] Coarse  [] Crackles  [] Wheezing        [] Diminished     Location : []RLL   []LLL    []RUL  []ISAURO     Cough: []Productive  []Dry  [x]N/A   Respirations:  [x]Easy  []Labored     Therapy:   [x]RA  []NC  l/min    Mask: []NRB []Venti       O2%                  []Ventilator  []Intubated  [] Trach  [] BiPaP     CARDIAC: [x]Regular      [] Irregular   [] Pericardial Rub  [] JVD        []  Monitored  [] Bedside  [] Remotely monitored [] N/A  Rhythm:      EDEMA: [] None  [x]Generalized  [] Pitting [] 1    [] 2    [] 3    [] 4                 [] Facial  [] Pedal  []  UE  [] LE     SKIN:   [x] Warm  [] Hot     [] Cold   [x] Dry     [] Pale   [] Diaphoretic                  [] Flushed  [] Jaundiced  [] Cyanotic  [] Rash  [] Weeping     LOC:    [x] Alert [x]Oriented:    [x] Person     [x] Place  [x]Time               [] Confused  [] Lethargic  [] Medicated  [] Non-responsive     GI / ABDOMEN:  [] Flat    [] Distended    [x] Soft    [] Firm   []  Obese                             [] Diarrhea  [x] Bowel Sounds  [] Nausea  [] Vomiting       / URINE ASSESSMENT:[] Voiding   [x] Oliguria  [] Anuria   []  Frye     [] Incontinent    []  Incontinent Brief      []  Bathroom Privileges       PAIN: [x] 0 []1  []2   []3   []4   []5   []6   []7   []8   []9   []10              Scale 0-10  Action/Follow Up:      MOBILITY:  [] Amb    [] Amb/Assist    [x] Bed    [] Wheelchair  [] Stretcher      All Vitals and Treatment Details on Attached [address]: [facility]          Room # 2544 [facility]      [] 1st Time Acute  [] Stat  [x] Routine  [] Urgent     [x] Acute Room  []  Bedside  [] ICU/CCU  [] ER   Isolation Precautions:   There are currently no Active Isolations      Special Considerations:         [] Blood Consent Verified [x]N/A     ALLERGIES: No Known Allergies            Code Status:Prior        Hepatitis Status:                        Lab Results   Component Value Date/Time    Hepatitis A, IgM NEGATIVE 03/23/2015 09:04 AM    Hepatitis B surface Ag <0.10 12/09/2022 07:03 PM    Hepatitis B surface Ab >1,000.00 12/09/2022 07:03 PM    Hepatitis B core, IgM NEGATIVE 03/23/2015 09:04 AM    Hepatitis C virus Ab 0.09 03/23/2015 09:04 AM                     Current Labs:   Lab Results   Component Value Date/Time    Sodium 143 02/09/2023 09:10 AM    Potassium 4.1 02/09/2023 09:10 AM    Chloride 108 02/09/2023 09:10 AM    CO2 29 02/09/2023 09:10 AM    Anion gap 6 02/09/2023 09:10 AM    Glucose 133 (H) 02/09/2023 09:10 AM    BUN 30 (H) 02/09/2023 09:10 AM    Creatinine 2.72 (H) 02/09/2023 09:10 AM    BUN/Creatinine ratio 11 (L) 02/09/2023 09:10 AM    GFR est AA 54 (L) 09/22/2022 04:20 AM    GFR est non-AA 44 (L) 09/22/2022 04:20 AM    Calcium 9.1 02/09/2023 09:10 AM      Lab Results Component Value Date/Time    WBC 5.8 02/03/2023 03:42 PM    HGB 9.1 (L) 02/03/2023 03:42 PM    HCT 29.4 (L) 02/03/2023 03:42 PM    PLATELET 378 81/84/0752 03:42 PM    MCV 92.2 02/03/2023 03:42 PM                                                                                     DIET: None       PRIMARY NURSE REPORT: First initial/Last name/Title      Pre Dialysis: Benjamín Jacob RN    Time: 2030      EDUCATION:    [x] Patient [] Other         Knowledge Basis: []None [x]Minimal [] Substantial   Barriers to learning  [x]N/A   [] Access Care     [] S&S of infection     [] Fluid Management     []K+     [x]Procedural    []Albumin     [] Medications     [] Tx Options     [] Transplant     [] Diet     [] Other   Teaching Tools:  [x] Explain  [] Demo  [] Handouts [] Video  Patient response:   [x] Verbalized understanding  [] Teach back  [] Return demonstration [] Requires follow up   Inappropriate due to            [x] Time Out/Safety Check  [x]Extracorporeal Circuit Tested for integrity       [address] - Before each treatment:     Machine Number:                   [facility]                                  [x] Unit Machine # 8 with centralized RO                                  [] Portable Machine #1/RO serial # [id number]                                  [] Portable Machine #2/RO serial # [id number]                                  [] Portable Machine #4/RO serial # [id number]                                                     [facility]                                  [] Portable Machine #11/RO serial # [id number]                                   [] Portable Machine #12/RO serial # [id number]                                  [] Portable Machine #13/RO serial #  [id number]      Alarm Test:  Pass time 2030               [x] RO/Machine Log Complete      Temp    36*-37*             Dialysate: pH  7.4 Conductivity: Meter   14     HD Machine   14                  TCD: 14  Dialyzer Lot # [id number]         Blood Tubing Lot # [id number]        Saline Lot #  [id number]     CHLORINE TESTING-Before each treatment and every 4 hours    Total Chlorine: [x] less than 0.1 ppm  Time: 2030 4 Hr/2nd Check Time:    (if greater than 0.1 ppm from Primary then every 30 minutes from Secondary)     TREATMENT INITIATION - with Dialysis Precautions:   [x] All Connections Secured                 [x] Saline Line Double Clamped   [x] Venous Parameters Set                  [x] Arterial Parameters Set    [x] Prime Given 250ml                          [x]Air Foam Detector Engaged      Treatment Initiation Note:Pt in stable condition. CVL accessed and treatment initiated without complication                                Post Assessment:   Dialyzer Cleared: [] Good [x] Fair  [] Poor  Blood processed:  38.2 L  UF Removed  1500 Ml  POst BP:   134/66       Pulse: 88        Respirations: 16  Temperature: 97.2 Lungs:     [x] Clear      [] Course         [] Crackles    [] Wheezing         [] Diminished   Post Tx Vascular Access:   AVF/AVG: Bleeding stopped   Art  min. Evelio. Min   N/A Cardiac:   [x] Regular   [] Irregular   [] Monitor  [] N/A      Rhythm:       Catheter:   Locking solution: Heparin 1:1000   Art. 1.9  Evelio. 1.9      Skin:   Pain:    [x] Warm  [x] Dry [] Diaphoretic    [] Flushed    [] Pale [] Cyanotic [x]0  []1  []2   []3  []4   []5   []6   []7   []8   []9   []10     Post Treatment Note:   HD well tolerated. 1.5L UF removed. NAD noted during or post treatment.  Face and access remained visible throughout treatment     POST TREATMENT PRIMARY NURSE HANDOFF REPORT:     First initial/Last name/Title         Post Dialysis: Gerry Owen RN Time:  2340     Abbreviations: AVG-arterial venous graft, AVF-arterial venous fistula, IJ-Internal Jugular, Subcl-Subclavian, Fem-Femoral, Tx-treatment, AP/HR-apical heart rate, DFR-dialysate flow rate, BFR-blood flow rate, AP-arterial pressure, -venous pressure, UF-ultrafiltrate, TMP-transmembrane pressure, Evelio-Venous, Art-Arterial, RO-Reverse Osmosis

## 2023-02-10 NOTE — HOME HEALTH
Skilled reason for visit: ostomy care, disease and medication management. Patient went on yesterday to have shunt placement, shunt noted to right upper chest, CDI, incision noted with RUE. Patient c/o pain to RUE, pending [place] prescription from doctors office, stated her  would  today. Caregiver involvement:  assist with ADLs, meal prep and transporation. Medications reviewed and all medications are available in the home this visit. The following education was provided regarding medications:  SN instructed patient about Gabapentin ( Neurontin ). It is a medication used to treat epilepsy, neuropathic pain and hot flashes. It is also used for restless leg syndrome. It is a first line agent for the treatment of neuropathic pain arising from diabetic neuropathy, post-herpetic neuralgia, and central neuropathic pain. Most common side effects of gabapentin in adult patients include dizziness, fatigue, drowsiness, weight gain, and peripheral edema. It may also produce sexual dysfunction in some patients, symptoms of which may include loss of libido, inability to reach sexual climax, and erectile dysfunction. It should be used carefully in patients with renal impairment due to possible accumulation and toxicity. Warning of an increased risk of depression and suicidal thoughts and behaviors in patients taking gabapentin. Report to Physician or nurse if any of those side effect / adverse reaction occurs. MD notified of any discrepancies/look a-like medications/medication interactions: none  Medications are effective at this time.       Home health supplies by type and quantity ordered/delivered this visit include: n/a    Patient education provided this visit:SN instructed patient instructed patient caregiver how to do Ostomy Care, as follow: Preparing, Applying, and Removing an Ostomy System to make the process easier and more effective, here are several easy steps you and your patients can follow when applying and removing an ostomy system: Remove Use an Adhesive Remover Wipe, Clean Clean & Dry Clean peristomal skin with non-moisturizing or non-oily soap, rinse well with clean water and pat dry, Measure Measure Stoma Place the stoma measuring guide over the stoma, measuring the stoma at the base., Protect use an Adhesive Remover Wipe, Apply New Pouching System/Skin Barrier. Make sure to have a good seal around the stoma.     Sharps education provided: Clinician instructed patient/CG on proper disposal of sharps: Containers should be made of hard plastic, be puncture-resistant and leakproof,   such as a laundry detergent or bleach bottle.  When the container is ¾ full, it should be sealed with tape and labeled   DO NOT RECYCLE prior to discarding in the regular trash.        Patient level of understanding of education provided: patient verbalized understanding    Skilled Care Performed this visit: ostomy teaching, disease and medication management    Patient response to procedure performed:  patient tolerated w/o any increased level of pain    Agency Progress toward goals: progressing    Patient's Progress towards personal goals: progressing    Home exercise program: monitor for s/s of infection    Continued need for the following skills: Nursing    Plan for next visit: ostomy teaching, disease and medication management    Patient and/or caregiver notified and agrees to changes in the Plan of Care YES/NO/NA: YES      The following discharge planning was discussed with the pt/caregiver: Discharge when goals are met, patient/caregiver able to manage disease process, medications, and pain

## 2023-02-10 NOTE — ED NOTES
Pt medicated per MAR  Tolerated well  Provided turkey sandwich and fluids, MD aware. Awaiting dialysis  Will continue to monitor and assess. Edil Underwood

## 2023-02-13 ENCOUNTER — OFFICE VISIT (OUTPATIENT)
Age: 73
End: 2023-02-13
Payer: MEDICARE

## 2023-02-13 VITALS
DIASTOLIC BLOOD PRESSURE: 80 MMHG | WEIGHT: 207.01 LBS | OXYGEN SATURATION: 98 % | SYSTOLIC BLOOD PRESSURE: 124 MMHG | HEIGHT: 64 IN | BODY MASS INDEX: 35.34 KG/M2 | HEART RATE: 88 BPM

## 2023-02-13 DIAGNOSIS — N18.6 ESRD (END STAGE RENAL DISEASE) ON DIALYSIS (HCC): Primary | ICD-10-CM

## 2023-02-13 DIAGNOSIS — Z99.2 ESRD (END STAGE RENAL DISEASE) ON DIALYSIS (HCC): Primary | ICD-10-CM

## 2023-02-13 PROCEDURE — 99215 OFFICE O/P EST HI 40 MIN: CPT | Performed by: STUDENT IN AN ORGANIZED HEALTH CARE EDUCATION/TRAINING PROGRAM

## 2023-02-13 PROCEDURE — 1123F ACP DISCUSS/DSCN MKR DOCD: CPT | Performed by: STUDENT IN AN ORGANIZED HEALTH CARE EDUCATION/TRAINING PROGRAM

## 2023-02-13 PROCEDURE — 3079F DIAST BP 80-89 MM HG: CPT | Performed by: STUDENT IN AN ORGANIZED HEALTH CARE EDUCATION/TRAINING PROGRAM

## 2023-02-13 PROCEDURE — 3074F SYST BP LT 130 MM HG: CPT | Performed by: STUDENT IN AN ORGANIZED HEALTH CARE EDUCATION/TRAINING PROGRAM

## 2023-02-13 ASSESSMENT — PATIENT HEALTH QUESTIONNAIRE - PHQ9
SUM OF ALL RESPONSES TO PHQ QUESTIONS 1-9: 0
2. FEELING DOWN, DEPRESSED OR HOPELESS: 0
SUM OF ALL RESPONSES TO PHQ QUESTIONS 1-9: 0
SUM OF ALL RESPONSES TO PHQ9 QUESTIONS 1 & 2: 0
1. LITTLE INTEREST OR PLEASURE IN DOING THINGS: 0
SUM OF ALL RESPONSES TO PHQ QUESTIONS 1-9: 0
SUM OF ALL RESPONSES TO PHQ QUESTIONS 1-9: 0

## 2023-02-13 NOTE — PROGRESS NOTES
1. Have you been to the ER, urgent care clinic since your last visit? No     Hospitalized since your last visit? No     2. Have you seen or consulted any other health care providers outside of the [address] [place] [address] since your last visit? Include any pap smears or colon screening.   No

## 2023-02-13 NOTE — PROGRESS NOTES
History and Physical    El Valladares is a 81 y.o. female who presents s/p right arm brachiocephalic fistula revision with superficialization and angioplasty and ligation of branches on 2/9/23. Patient noted drainage from the wound and requested an urgent appointment. On my evaluation, there is minimal hematoma, no concern for erythema or cellulitis or wound infection nor any dehiscence. Dermabond is intact. Thrill not palpable due to edema however easily dopplerable without concern for occlusion. Physical Exam:    /80 (Site: Left Upper Arm, Position: Sitting)   Pulse 88   Ht 5' 4\" (1.626 m)   Wt 207 lb 0.2 oz (93.9 kg)   SpO2 98%   BMI 35.53 kg/m²      Impression and Plan:  El Valladares is a 81 y.o. female s/p right arm brachiocephalic fistula revision  - RTC in 2 weeks with duplex of the dialysis access. We reviewed the plan with the patient and the patient understands. No follow-up provider specified. Jordan Hamilton MD            Past Medical History:   Diagnosis Date    Abnormal WBC count 05/17/2016    Anemia     Bilateral shoulder pain 09/27/2016    Chondromalacia of both patellae     Chronic heart failure with preserved ejection fraction ([address] 01/13/2023    Chronic kidney disease     COPD (chronic obstructive pulmonary disease) ([address] 09/2015    mild-mod dz; Dr Dewey Vizcarra    Diabetes [facility]) 2013    Diabetic eye exam [facility]) 2016    Dialysis patient [facility])     T/Th/Sat at [facility] AT [place] [phone]    Dilated cardiomyopathy ([address]     Dyslipidemia     Gout     Heart attack ([address] 10/18/2019    Stented    History of echocardiogram 11/14/2014    Mild LVE. EF 40%. Mild, diffuse hypk. Mild LAE. Mild MR.       Hypercholesteremia 01/08/2014    Hypertension 2000    Noncompliance with medications 02/16/2016    Obesity     Orthostatic hypotension 05/17/2016    Osteoarthritis of both knees     Pain management 04/01/2016    Dr. Anjali Monique     Popliteal cyst     Sleep apnea     not using cpap    Stage 3b chronic kidney disease (HCC) 08/08/2022    Vitamin D deficiency 10/16/2014     Past Surgical History:   Procedure Laterality Date    CARDIAC CATHETERIZATION  2019    Coronary stent    COLOSTOMY  01/18/2023    IR TUNNELED CATHETER PLACEMENT GREATER THAN 5 YEARS  9/21/2022    IR TUNNELED CATHETER PLACEMENT GREATER THAN 5 YEARS 9/21/2022 [facility] RAD ANGIO IR    IR TUNNELED CATHETER PLACEMENT GREATER THAN 5 YEARS  09/21/2022    OTHER SURGICAL HISTORY  01/18/2023    Exploratory laparotomy, sigmoid colectomy, and descending colostomy    TUBAL LIGATION       Patient Active Problem List   Diagnosis    Environmental allergies    MICHAEL on CPAP    Chronic pain syndrome    Osteoarthritis of both knees    Hypotension    Coronary artery disease involving native coronary artery of native heart without angina pectoris    Acute pulmonary edema (HCC)    Coronary artery disease involving native coronary artery with unstable angina pectoris (HCC)    Pulmonary edema cardiac cause (HCC)    Hyperkalemia    Chronic obstructive pulmonary disease (HCC)    Bilateral shoulder pain    Respiratory failure requiring intubation (HCC)    Flash pulmonary edema (HCC)    Elevated d-dimer    Syncope    CAD (coronary artery disease)    Compliance with medication regimen    Chronic pain of both knees    Ischemic cardiomyopathy    Type 2 diabetes mellitus with hyperglycemia (HCC)    Dyslipidemia, goal LDL below 70    Iron deficiency anemia    SOB (shortness of breath)    Chronic systolic congestive heart failure (HCC)    Former smoker    Nonrheumatic mitral valve regurgitation    Caregiver stress    Cardiomyopathy, dilated (HCC)    Sepsis (HCC)    Pulmonary edema    Severe obesity (BMI 35.0-39.9) with comorbidity (HCC)    COPD with acute exacerbation (HCC)    Primary osteoarthritis of both knees    Elevated alkaline phosphatase level    Decreased GFR    Essential hypertension    STEMI (ST elevation myocardial infarction) (HCC)    Acute hyperkalemia  Breast cancer ([facility]    Rectal cancer ([facility]    History of myocardial infarction    Stage 1 acute kidney injury ([facility]    Pulmonary hypertension ([facility])    Acute exacerbation of CHF (congestive heart failure) ([facility])    Acute respiratory failure with hypoxia ([facility])    Pneumonia due to COVID-19 virus    Acute on chronic diastolic heart failure ([facility])    Stage 3b chronic kidney disease ([facility])    CHF (congestive heart failure) ([facility])    CVA (cerebral vascular accident) ([facility]    ESRD (end stage renal disease) on dialysis ([facility]    TIA (transient ischemic attack)    Dizziness    Stenosis of both vertebral arteries    Type 2 diabetes mellitus with hyperglycemia, with long-term current use of insulin ([facility])    Sinus tachycardia    Chronic heart failure with preserved ejection fraction ([facility]    Anemia due to chronic kidney disease, on chronic dialysis ([facility])     Current Outpatient Medications   Medication Sig Dispense Refill    aspirin 81 MG chewable tablet Take 162 mg by mouth daily      gabapentin (NEURONTIN) 400 MG capsule Take 400 mg by mouth daily. hydrALAZINE (APRESOLINE) 100 MG tablet Take 100 mg by mouth 2 times daily      insulin detemir (LEVEMIR) 100 UNIT/ML injection vial 15 units daily for diabetes once daily with lunch      ipratropium-albuterol (DUONEB) 0.5-2.5 (3) MG/3ML SOLN nebulizer solution Inhale 3 mLs into the lungs every 6 hours as needed      metoprolol tartrate (LOPRESSOR) 25 MG tablet Take 25 mg by mouth 2 times daily      tiZANidine (ZANAFLEX) 4 MG tablet Take 4 mg by mouth 2 times daily as needed      Umeclidinium-Vilanterol 62.5-25 MCG/ACT AEPB Inhale 1 puff into the lungs daily       No current facility-administered medications for this visit.      No Known Allergies  Social History     Socioeconomic History    Marital status:      Spouse name: Not on file    Number of children: Not on file    Years of education: Not on file    Highest education level: Not on file   Occupational History    Not on file   Tobacco Use    Smoking status: Former     Packs/day: 1.00     Types: Cigarettes     Quit date: 5/20/2019     Years since quitting: 3.7    Smokeless tobacco: Never   Substance and Sexual Activity    Alcohol use: No     Alcohol/week: 0.0 standard drinks    Drug use: Never    Sexual activity: Not on file   Other Topics Concern    Not on file   Social History Narrative    Not on file     Social Determinants of Health     Financial Resource Strain: Not on file   Food Insecurity: Not on file   Transportation Needs: Not on file   Physical Activity: Not on file   Stress: Not on file   Social Connections: Not on file   Intimate Partner Violence: Not on file   Housing Stability: Not on file     Family History   Problem Relation Age of Onset    Diabetes Mother     Hypertension Mother     Hypertension Father     Kidney Disease Maternal Aunt 48        Dialysis

## 2023-02-15 ENCOUNTER — HOME CARE VISIT (OUTPATIENT)
Dept: SCHEDULING | Facility: HOME HEALTH | Age: 73
End: 2023-02-15
Payer: MEDICARE

## 2023-02-15 VITALS
OXYGEN SATURATION: 98 % | RESPIRATION RATE: 18 BRPM | SYSTOLIC BLOOD PRESSURE: 156 MMHG | TEMPERATURE: 97.2 F | HEART RATE: 98 BPM | DIASTOLIC BLOOD PRESSURE: 98 MMHG

## 2023-02-15 PROCEDURE — G0299 HHS/HOSPICE OF RN EA 15 MIN: HCPCS

## 2023-02-15 NOTE — HOME HEALTH
Skilled reason for visit: ostomy care and teaching, disease and mediation management. Caregiver involvement:  assists with ADLs, meal prep and transportation. Medications reviewed and all medications are available in the home this visit. The following education was provided regarding medications:  SN instructed caregiver about Hydrocodone. Hydrocodone is used to treat moderate to severe pain . Common side effects of hydrocodone may include, nausea, vomiting, constipation, drowsiness, dizziness, lightheadedness, fuzzy thinking, anxiety, abnormally happy or sad mood, dry throat, difficulty urinating, rash, itching, and narrowing of the pupils. Serious side effects include slowed or irregular breathing and chest tightness. Symptoms of hydrocodone overdose include narrowed or widened pupils, slow, shallow, or stopped breathing, slowed or stopped heartbeat, cold, clammy, or blue skin, excessive sleepiness, loss of consciousness, seizures, or death. Hydrocodone can be habit-forming, causing physical and psychological dependence. MD notified of any discrepancies/look a-like medications/medication interactions: none  Medications are effective at this time. Home health supplies by type and quantity ordered/delivered this visit include: none    Patient education provided this visit: :Instructed in materials used in wound care. However, even with proper treatment, a wound infection may occur. Check the wound daily for signs of infection like increased drainage or bleeding from the wound that wont stop with direct pressure, redness in or around the wound, foul odor or pus coming from the wound, increased swelling around the wound and ever above 101.0°F or shaking chills.       Sharps education provided: Clinician instructed patient/CG on proper disposal of sharps: Containers should be made of hard plastic, be puncture-resistant and leakproof,   such as a laundry detergent or bleach bottle.  When the container is ¾ full, it should be sealed with tape and labeled   DO NOT RECYCLE prior to discarding in the regular trash.        Patient level of understanding of education provided: patient verbalized understanding      Skilled Care Performed this visit: ostomy teaching    Patient response to procedure performed:  patient tolerated w/o any increased level of pain    Agency Progress toward goals: progressing    Patient's Progress towards personal goals: needs improvement    Home exercise program:  Patient to continue with healthy heart/diabetic diet, take all medications as prescribed, implement fall and infection control precautions, report MD immediately if any changes in status or vital signs.     Continued need for the following skills: Nursing    Plan for next visit: ostomy teaching, disease and medication management    Patient and/or caregiver notified and agrees to changes in the Plan of Care YES/NO/NA: YES      The following discharge planning was discussed with the pt/caregiver: Discharge when goals are met, patient/caregiver able to manage disease process, medications, and pain

## 2023-02-17 ENCOUNTER — HOME CARE VISIT (OUTPATIENT)
Dept: HOME HEALTH SERVICES | Facility: HOME HEALTH | Age: 73
End: 2023-02-17
Payer: MEDICARE

## 2023-02-18 ENCOUNTER — HOME CARE VISIT (OUTPATIENT)
Dept: SCHEDULING | Facility: HOME HEALTH | Age: 73
End: 2023-02-18
Payer: MEDICARE

## 2023-02-22 ENCOUNTER — HOME CARE VISIT (OUTPATIENT)
Dept: SCHEDULING | Facility: HOME HEALTH | Age: 73
End: 2023-02-22
Payer: MEDICARE

## 2023-02-22 VITALS
BODY MASS INDEX: 35.23 KG/M2 | TEMPERATURE: 97.5 F | DIASTOLIC BLOOD PRESSURE: 82 MMHG | RESPIRATION RATE: 18 BRPM | SYSTOLIC BLOOD PRESSURE: 148 MMHG | WEIGHT: 205.25 LBS | HEART RATE: 89 BPM | OXYGEN SATURATION: 98 %

## 2023-02-22 PROCEDURE — G0299 HHS/HOSPICE OF RN EA 15 MIN: HCPCS

## 2023-02-22 NOTE — HOME HEALTH
Skilled reason for visit: Ostomy teaching, patient needs reinforcement teaching regarding ostomy care. Patients  applies ostomy bag for patient but patient has difficulty during the day when  is at work.  has been applying electrical tape to ostomy closing in which patient can not open ostomy bag to dump or release air. Patient and  has been educated not to tape end of ostomy bag and educated on proper application of ostomy bag and how to empty when needed. Patient also not checking blood sugars daily, patient states \"I just havent checked it in a couple of days. \" Patient educated on the importance of diabetes and checking blood sugars as prescribed by MD. Patient verbalized understanding. Caregiver involvement:  assists with ostomy care and transportation to medical appointments. Medications reviewed and all medications are available in the home this visit. The following education was provided regarding medications: Levemir is a long-acting insulin that can be taken once or twice daily to control high blood sugar in adults with diabetes. MD notified of any discrepancies/look a-like medications/medication interactions: none  Medications are effective at this time. Home health supplies by type and quantity ordered/delivered this visit include: n/a    Patient education provided this visit: Instructed patient/caregiver that hypoglycemic medications may result in lower blood sugars than body is accustomed to, notify SN/PT of dizziness, confusion, nervousness, or any other symptoms of low blood sugar. Instructed patient/caregiver to take exact amount of narcotics prescribed, signs and symptoms of oversedation, notify SN/PT if oversedated. May cause constipation, notify SN/PT if no BM x 3 days.           Sharps education provided: Clinician instructed patient/CG on proper disposal of sharps: Containers should be made of hard plastic, be puncture-resistant and leakproof, such as a laundry detergent or bleach bottle.  When the container is ¾ full, it should be sealed with tape and labeled   DO NOT RECYCLE prior to discarding in the regular trash.        Patient level of understanding of education provided: patient verbalized understanding      Skilled Care Performed this visit: ostomy care, teaching, disease and medication management    Patient response to procedure performed:  Patient tolerated w/o any increased level of pain      Agency Progress toward goals: progressing    Patient's Progress towards personal goals: need improvement    Home exercise program:  Patient to continue with healthy heart/diabetic diet, take all medications as prescribed, implement fall and infection control precautions, report MD immediately if any changes in status or vital signs.       Continued need for the following skills: Nursing    Plan for next visit: ostomy teaching, disease and medication management    Patient and/or caregiver notified and agrees to changes in the Plan of Care YES/NO/NA: YES      The following discharge planning was discussed with the pt/caregiver: Discharge when goals are met, patient/caregiver able to manage disease process, medications, and pain

## 2023-02-24 ENCOUNTER — HOME CARE VISIT (OUTPATIENT)
Dept: SCHEDULING | Facility: HOME HEALTH | Age: 73
End: 2023-02-24
Payer: MEDICARE

## 2023-03-01 ENCOUNTER — OFFICE VISIT (OUTPATIENT)
Age: 73
End: 2023-03-01
Payer: MEDICARE

## 2023-03-01 VITALS
HEIGHT: 64 IN | WEIGHT: 203 LBS | HEART RATE: 90 BPM | OXYGEN SATURATION: 97 % | BODY MASS INDEX: 34.66 KG/M2 | DIASTOLIC BLOOD PRESSURE: 72 MMHG | SYSTOLIC BLOOD PRESSURE: 138 MMHG

## 2023-03-01 DIAGNOSIS — Z99.2 ESRD (END STAGE RENAL DISEASE) ON DIALYSIS (HCC): Primary | ICD-10-CM

## 2023-03-01 DIAGNOSIS — G89.18 POST-OP PAIN: Primary | ICD-10-CM

## 2023-03-01 DIAGNOSIS — M79.605 LEFT LEG PAIN: ICD-10-CM

## 2023-03-01 DIAGNOSIS — N18.6 ESRD (END STAGE RENAL DISEASE) ON DIALYSIS (HCC): Primary | ICD-10-CM

## 2023-03-01 DIAGNOSIS — Z91.81 HISTORY OF RECENT FALL: Primary | ICD-10-CM

## 2023-03-01 DIAGNOSIS — M17.12 UNILATERAL PRIMARY OSTEOARTHRITIS, LEFT KNEE: ICD-10-CM

## 2023-03-01 DIAGNOSIS — M25.562 LEFT KNEE PAIN, UNSPECIFIED CHRONICITY: ICD-10-CM

## 2023-03-01 PROCEDURE — 3078F DIAST BP <80 MM HG: CPT | Performed by: STUDENT IN AN ORGANIZED HEALTH CARE EDUCATION/TRAINING PROGRAM

## 2023-03-01 PROCEDURE — 99215 OFFICE O/P EST HI 40 MIN: CPT | Performed by: STUDENT IN AN ORGANIZED HEALTH CARE EDUCATION/TRAINING PROGRAM

## 2023-03-01 PROCEDURE — 3075F SYST BP GE 130 - 139MM HG: CPT | Performed by: STUDENT IN AN ORGANIZED HEALTH CARE EDUCATION/TRAINING PROGRAM

## 2023-03-01 PROCEDURE — 1123F ACP DISCUSS/DSCN MKR DOCD: CPT | Performed by: PHYSICIAN ASSISTANT

## 2023-03-01 PROCEDURE — 1123F ACP DISCUSS/DSCN MKR DOCD: CPT | Performed by: STUDENT IN AN ORGANIZED HEALTH CARE EDUCATION/TRAINING PROGRAM

## 2023-03-01 PROCEDURE — 99214 OFFICE O/P EST MOD 30 MIN: CPT | Performed by: PHYSICIAN ASSISTANT

## 2023-03-01 RX ORDER — OXYCODONE HYDROCHLORIDE AND ACETAMINOPHEN 5; 325 MG/1; MG/1
1 TABLET ORAL EVERY 6 HOURS PRN
Qty: 5 TABLET | Refills: 0 | Status: CANCELLED | OUTPATIENT
Start: 2023-03-01 | End: 2023-03-04

## 2023-03-01 RX ORDER — OXYCODONE HYDROCHLORIDE AND ACETAMINOPHEN 5; 325 MG/1; MG/1
1 TABLET ORAL EVERY 6 HOURS PRN
Qty: 5 TABLET | Refills: 0 | Status: SHIPPED | OUTPATIENT
Start: 2023-03-01 | End: 2023-03-04

## 2023-03-01 NOTE — PROGRESS NOTES
History and Physical    Sherie Abdi is a 81 y.o. female with creation of right arm brachiocephalic fistula in December 2022 by Dr. Allyssa Min. Patient is right hand dominant and did not have any available veins in the left arm. Patient then underwent superficialization and ligation of branches of the fistula by Dr. Navarro Ledesma on 2/9/23. Patient then seen for wound dehiscence and infection. Today patient was seen with a duplex which shows non-maturation with severe proximal stenosis. Fistula unlikely to mature. Patient wound has healed well. Patient needs an AVG and will plan for left arm brachial artery to axillary vein artegraft AVG creation. Physical Exam:    /72   Pulse 90   Ht 5' 4\" (1.626 m)   Wt 203 lb (92.1 kg)   SpO2 97%   BMI 34.84 kg/m²      Impression and Plan:  Sherie Abdi is a 81 y.o. female with ESRD on HD with failed right arm cephalic vein AVF. - left arm brachial artery to axillary vein AVG     We reviewed the plan with the patient and the patient understands. No follow-up provider specified. Gigi Martinez MD            Past Medical History:   Diagnosis Date    Abnormal WBC count 05/17/2016    Anemia     Bilateral shoulder pain 09/27/2016    Chondromalacia of both patellae     Chronic heart failure with preserved ejection fraction ([address] 01/13/2023    Chronic kidney disease     COPD (chronic obstructive pulmonary disease) ([address] 09/2015    mild-mod dz; Dr Salomon Sanders    Diabetes [facility]) 2013    Diabetic eye exam [facility]) 2016    Dialysis patient [facility])     T/Th/Sat at [facility] AT [place] [phone]    Dilated cardiomyopathy ([address]     Dyslipidemia     Gout     Heart attack ([address] 10/18/2019    Stented    History of echocardiogram 11/14/2014    Mild LVE. EF 40%. Mild, diffuse hypk. Mild LAE. Mild MR.       Hypercholesteremia 01/08/2014    Hypertension 2000    Noncompliance with medications 02/16/2016    Obesity     Orthostatic hypotension 05/17/2016    Osteoarthritis of both knees     Pain management 04/01/2016    Dr. Nilo Bustamante     Popliteal cyst     Sleep apnea     not using cpap    Stage 3b chronic kidney disease ([facility] 08/08/2022    Vitamin D deficiency 10/16/2014     Past Surgical History:   Procedure Laterality Date    CARDIAC CATHETERIZATION  2019    Coronary stent    COLOSTOMY  01/18/2023    IR TUNNELED CATHETER PLACEMENT GREATER THAN 5 YEARS  9/21/2022    IR TUNNELED CATHETER PLACEMENT GREATER THAN 5 YEARS 9/21/2022 [facility] RAD ANGIO IR    IR TUNNELED CATHETER PLACEMENT GREATER THAN 5 YEARS  09/21/2022    OTHER SURGICAL HISTORY  01/18/2023    Exploratory laparotomy, sigmoid colectomy, and descending colostomy    TUBAL LIGATION       Patient Active Problem List   Diagnosis    Environmental allergies    MICHAEL on CPAP    Chronic pain syndrome    Osteoarthritis of both knees    Hypotension    Coronary artery disease involving native coronary artery of native heart without angina pectoris    Acute pulmonary edema (HCC)    Coronary artery disease involving native coronary artery with unstable angina pectoris (HCC)    Pulmonary edema cardiac cause (HCC)    Hyperkalemia    Chronic obstructive pulmonary disease (HCC)    Bilateral shoulder pain    Respiratory failure requiring intubation (HCC)    Flash pulmonary edema (HCC)    Elevated d-dimer    Syncope    CAD (coronary artery disease)    Compliance with medication regimen    Chronic pain of both knees    Ischemic cardiomyopathy    Type 2 diabetes mellitus with hyperglycemia (HCC)    Dyslipidemia, goal LDL below 70    Iron deficiency anemia    SOB (shortness of breath)    Chronic systolic congestive heart failure ([address]    Former smoker    Nonrheumatic mitral valve regurgitation    Caregiver stress    Cardiomyopathy, dilated ([address]    Sepsis ([address]    Pulmonary edema    Severe obesity (BMI 35.0-39. 9) with comorbidity ([address]    COPD with acute exacerbation (HCC)    Primary osteoarthritis of both knees    Elevated alkaline phosphatase level Decreased GFR    Essential hypertension    STEMI (ST elevation myocardial infarction) ([facility])    Acute hyperkalemia    Breast cancer (HCC)    Rectal cancer ([facility])    History of myocardial infarction    Stage 1 acute kidney injury [facility]    Pulmonary hypertension ([facility])    Acute exacerbation of CHF (congestive heart failure) ([facility])    Acute respiratory failure with hypoxia ([facility])    Pneumonia due to COVID-19 virus    Acute on chronic diastolic heart failure ([facility])    Stage 3b chronic kidney disease ([facility])    CHF (congestive heart failure) ([facility])    CVA (cerebral vascular accident) ([facility]    ESRD (end stage renal disease) on dialysis ([facility]    TIA (transient ischemic attack)    Dizziness    Stenosis of both vertebral arteries    Type 2 diabetes mellitus with hyperglycemia, with long-term current use of insulin ([facility])    Sinus tachycardia    Chronic heart failure with preserved ejection fraction ([facility])    Anemia due to chronic kidney disease, on chronic dialysis ([facility])     Current Outpatient Medications   Medication Sig Dispense Refill    albuterol sulfate HFA (PROVENTIL;VENTOLIN;PROAIR) 108 (90 Base) MCG/ACT inhaler Inhale 2 puffs into the lungs every 6 hours as needed      atorvastatin (LIPITOR) 80 MG tablet Take 80 mg by mouth      docusate (COLACE, DULCOLAX) 100 MG CAPS Take 100 mg by mouth 2 times daily      guaiFENesin (MUCINEX) 600 MG extended release tablet Take 600 mg by mouth 2 times daily      aspirin 81 MG chewable tablet Take 162 mg by mouth daily      gabapentin (NEURONTIN) 400 MG capsule Take 400 mg by mouth daily.       hydrALAZINE (APRESOLINE) 100 MG tablet Take 100 mg by mouth 2 times daily      insulin detemir (LEVEMIR) 100 UNIT/ML injection vial 15 units daily for diabetes once daily with lunch      ipratropium-albuterol (DUONEB) 0.5-2.5 (3) MG/3ML SOLN nebulizer solution Inhale 3 mLs into the lungs every 6 hours as needed      metoprolol tartrate (LOPRESSOR) 25 MG tablet Take 25 mg by mouth 2 times daily tiZANidine (ZANAFLEX) 4 MG tablet Take 4 mg by mouth 2 times daily as needed      Umeclidinium-Vilanterol 62.5-25 MCG/ACT AEPB Inhale 1 puff into the lungs daily       No current facility-administered medications for this visit.      No Known Allergies  Social History     Socioeconomic History    Marital status:      Spouse name: Not on file    Number of children: Not on file    Years of education: Not on file    Highest education level: Not on file   Occupational History    Not on file   Tobacco Use    Smoking status: Former     Packs/day: 1.00     Types: Cigarettes     Quit date: 5/20/2019     Years since quitting: 3.7    Smokeless tobacco: Never   Substance and Sexual Activity    Alcohol use: No     Alcohol/week: 0.0 standard drinks    Drug use: Never    Sexual activity: Not on file   Other Topics Concern    Not on file   Social History Narrative    Not on file     Social Determinants of Health     Financial Resource Strain: Not on file   Food Insecurity: Not on file   Transportation Needs: Not on file   Physical Activity: Not on file   Stress: Not on file   Social Connections: Not on file   Intimate Partner Violence: Not on file   Housing Stability: Not on file     Family History   Problem Relation Age of Onset    Diabetes Mother     Hypertension Mother     Hypertension Father     Kidney Disease Maternal Aunt 48        Dialysis

## 2023-03-01 NOTE — PROGRESS NOTES
Patient: Talha Nicholas                MRN: [id number]       SSN: [id number]  YOB: 1950        AGE: 81 y.o. SEX: female  There is no height or weight on file to calculate BMI. PCP: Roma Lobato MD  03/01/23      This office note has been dictated. REVIEW OF SYSTEMS:  Constitutional: Negative for fever, chills, weight loss and malaise/fatigue. HENT: Negative. Eyes: Negative. Respiratory: Negative. Cardiovascular: Negative. Gastrointestinal: No bowel incontinence or constipation. Genitourinary: No bladder incontinence or saddle anesthesia. Skin: Negative. Neurological: Negative. Endo/Heme/Allergies: Negative. Psychiatric/Behavioral: Negative. Musculoskeletal: As per HPI above. Past Medical History:   Diagnosis Date    Abnormal WBC count 05/17/2016    Anemia     Bilateral shoulder pain 09/27/2016    Chondromalacia of both patellae     Chronic heart failure with preserved ejection fraction ([facility] 01/13/2023    Chronic kidney disease     COPD (chronic obstructive pulmonary disease) ([facility] 09/2015    mild-mod dz; Dr Mark Abad    Diabetes [facility]) 2013    Diabetic eye exam [facility]) 2016    Dialysis patient [facility])     T/Th/Sat at [address] [place] [address] [phone]    Dilated cardiomyopathy ([facility]     Dyslipidemia     Gout     Heart attack ([facility] 10/18/2019    Stented    History of echocardiogram 11/14/2014    Mild LVE. EF 40%. Mild, diffuse hypk. Mild LAE. Mild MR.       Hypercholesteremia 01/08/2014    Hypertension 2000    Noncompliance with medications 02/16/2016    Obesity     Orthostatic hypotension 05/17/2016    Osteoarthritis of both knees     Pain management 04/01/2016    Dr. Mayur Campo     Popliteal cyst     Sleep apnea     not using cpap    Stage 3b chronic kidney disease ([facility] 08/08/2022    Vitamin D deficiency 10/16/2014         Current Outpatient Medications:     albuterol sulfate HFA (PROVENTIL;VENTOLIN;PROAIR) 108 (90 Base) MCG/ACT inhaler, Inhale 2 puffs into the lungs every 6 hours as needed, Disp: , Rfl:     atorvastatin (LIPITOR) 80 MG tablet, Take 80 mg by mouth, Disp: , Rfl:     docusate (COLACE, DULCOLAX) 100 MG CAPS, Take 100 mg by mouth 2 times daily, Disp: , Rfl:     guaiFENesin (MUCINEX) 600 MG extended release tablet, Take 600 mg by mouth 2 times daily, Disp: , Rfl:     aspirin 81 MG chewable tablet, Take 162 mg by mouth daily, Disp: , Rfl:     gabapentin (NEURONTIN) 400 MG capsule, Take 400 mg by mouth daily. , Disp: , Rfl:     hydrALAZINE (APRESOLINE) 100 MG tablet, Take 100 mg by mouth 2 times daily, Disp: , Rfl:     insulin detemir (LEVEMIR) 100 UNIT/ML injection vial, 15 units daily for diabetes once daily with lunch, Disp: , Rfl:     ipratropium-albuterol (DUONEB) 0.5-2.5 (3) MG/3ML SOLN nebulizer solution, Inhale 3 mLs into the lungs every 6 hours as needed, Disp: , Rfl:     metoprolol tartrate (LOPRESSOR) 25 MG tablet, Take 25 mg by mouth 2 times daily, Disp: , Rfl:     tiZANidine (ZANAFLEX) 4 MG tablet, Take 4 mg by mouth 2 times daily as needed, Disp: , Rfl:     Umeclidinium-Vilanterol 62.5-25 MCG/ACT AEPB, Inhale 1 puff into the lungs daily, Disp: , Rfl:     No Known Allergies    Social History     Socioeconomic History    Marital status:      Spouse name: Not on file    Number of children: Not on file    Years of education: Not on file    Highest education level: Not on file   Occupational History    Not on file   Tobacco Use    Smoking status: Former     Packs/day: 1.00     Types: Cigarettes     Quit date: 5/20/2019     Years since quitting: 3.7    Smokeless tobacco: Never   Substance and Sexual Activity    Alcohol use: No     Alcohol/week: 0.0 standard drinks    Drug use: Never    Sexual activity: Not on file   Other Topics Concern    Not on file   Social History Narrative    Not on file     Social Determinants of Health     Financial Resource Strain: Not on file   Food Insecurity: Not on file   Transportation Needs: Not on file   Physical Activity: Not on file   Stress: Not on file   Social Connections: Not on file   Intimate Partner Violence: Not on file   Housing Stability: Not on file       Past Surgical History:   Procedure Laterality Date    CARDIAC CATHETERIZATION  2019    Coronary stent    COLOSTOMY  01/18/2023    IR TUNNELED CATHETER PLACEMENT GREATER THAN 5 YEARS  9/21/2022    IR TUNNELED CATHETER PLACEMENT GREATER THAN 5 YEARS 9/21/2022 [facility] RAD ANGIO IR    IR TUNNELED CATHETER PLACEMENT GREATER THAN 5 YEARS  09/21/2022    OTHER SURGICAL HISTORY  01/18/2023    Exploratory laparotomy, sigmoid colectomy, and descending colostomy    TUBAL LIGATION             Patient seen evaluated today for her left knee.  She has been seen previously by  Mehdipetronagomez regarding her left knee.  She had an aspiration and injection about 2 months ago.  Staples were completed well for her.  She unfortunate a fall about 2 weeks ago which aggravated her knee.  She has been able to ambulate but has little bit more discomfort in the knee.    She reports that she has been hospitalized and shunt was put in her arm for dialysis.    She denies any fevers or chills.    Patient denies recent fevers, chills, chest pain, SOB, or injuries.   No recent systemic changes noted.  A 12-point review of systems is performed today.  Pertinent positives are noted.  All other systems reviewed and otherwise are negative.    Physical exam: General: Alert and oriented x3, nad.  well-developed, well nourished.  normal affect, AF.  NC/AT, EOMI, neck supple, trachea midline, no JVD present. Breathing is non-labored.  Examination of the lower extremities reveals pain-free range of motion the hips.  There is no pain to palpation trochanter bursa.  Neck straight leg raise.  Negative calf tenderness.  Negative Homans.  No signs of DVT present.  The left knee reveals skin intact.  There is no erythema or ecchymosis noted.  She has minimal effusion with negative patella  ballottement. There are no signs for infection or cellulitis present. She does have pain to palpation to the lateral joint line as well as patellofemoral grinding crepitus anteriorly with range of motion activities noted. Radiographs obtained in office today 3/1/2023 at [address] location of the left knee including AP, tunnel, lateral, skyline shows advanced osteoarthritis with near bone-on-bone eburnation to the lateral and patellofemoral reticulation without obvious acute abnormalities noted. Assessment: Left knee advanced arthritis, pain status post fall    Plan: At this point, given the cortisone injection was working pretty good until her fall order to move forward with a CT scan of the left knee for further evaluation of an occult fracture. We discussed an MRI and the patient is not comfortable with an MRI. She will continue with Tylenol as needed for pain. Activities as tolerated. We will see her back after the scan for further evaluation. In the meantime we will get a series of viscosupplementation preapproved to try to maximize her nonoperative treatment.             JR Haroon CAMPOS, WINDY, ATC

## 2023-03-01 NOTE — PROGRESS NOTES
1. Have you been to the ER, urgent care clinic since your last visit? No  Hospitalized since your last visit? No    2. Have you seen or consulted any other health care providers outside of the [address] [place] [address] since your last visit? Include any pap smears or colon screening.  No

## 2023-03-02 ENCOUNTER — HOME CARE VISIT (OUTPATIENT)
Dept: SCHEDULING | Facility: HOME HEALTH | Age: 73
End: 2023-03-02
Payer: MEDICARE

## 2023-03-02 VITALS
OXYGEN SATURATION: 96 % | SYSTOLIC BLOOD PRESSURE: 156 MMHG | HEART RATE: 81 BPM | RESPIRATION RATE: 18 BRPM | WEIGHT: 210 LBS | TEMPERATURE: 97.8 F | DIASTOLIC BLOOD PRESSURE: 90 MMHG | BODY MASS INDEX: 36.05 KG/M2

## 2023-03-02 PROCEDURE — G0299 HHS/HOSPICE OF RN EA 15 MIN: HCPCS

## 2023-03-02 NOTE — HOME HEALTH
Skilled reason for visit: Ostomy teaching, disease and medication management. Patient getting a arteral graft to SCOOTER on 3/10/23 due to right AV graft not working. HD access site to R chest, CDI, patient stated MD places two sutures to site as it was loose. Incision to R arm MIKE SEVILLA. Ostomy teaching progressing. Caregiver involvement:  assists with ADLs and transportation. Medications reviewed and all medications are available in the home this visit. The following education was provided regarding medications: SN instructed in possible adverse reactions to Lasix, including dehydration, orthostatic hypotension, loss of potassium and other electrolytes, weakness, and fatigue. Lasix is used to reduce extra fluid in the body (edema) caused by conditions such as heart failure, liver disease, and kidney disease. This can lessen symptoms such as shortness of breath and swelling in your arms, legs, and abdomen. This drug is also used to treat high blood pressure. Lowering high blood pressure helps prevent strokes, heart attacks, and kidney problems. Lasix is a \"water pill\" (diuretic) that causes you to make more urine. This helps your body get rid of extra water and salt. MD notified of any discrepancies/look a-like medications/medication interactions: none  Medications are effective at this time. Home health supplies by type and quantity ordered/delivered this visit include: ostomy supplies    Patient education provided this visit: :Instructed in materials used in wound care. However, even with proper treatment, a wound infection may occur. Check the wound daily for signs of infection like increased drainage or bleeding from the wound that wont stop with direct pressure, redness in or around the wound, foul odor or pus coming from the wound, increased swelling around the wound and ever above 101.0°F or shaking chills.     Sharps education provided: Clinician instructed patient/CG on proper disposal of sharps: Containers should be made of hard plastic, be puncture-resistant and leakproof,   such as a laundry detergent or bleach bottle.  When the container is ¾ full, it should be sealed with tape and labeled   DO NOT RECYCLE prior to discarding in the regular trash.        Patient level of understanding of education provided: patient verbalized understanding      Skilled Care Performed this visit: ostomy teaching, disease and medication management    Patient response to procedure performed:  patient verbalized understanding      Agency Progress toward goals: progresing    Patient's Progress towards personal goals: progressing    Home exercise program:  Patient to continue with healthy heart/diabetic diet, take all medications as prescribed, implement fall and infection control precautions, report MD immediately if any changes in status or vital signs.       Continued need for the following skills: Nursing    Plan for next visit: ostomy teaching, disease and medication management    Patient and/or caregiver notified and agrees to changes in the Plan of Care YES/NO/NA: YES      The following discharge planning was discussed with the pt/caregiver: Discharge when goals are met, patient/caregiver able to manage disease process, medications, and pain

## 2023-03-04 ENCOUNTER — HOME CARE VISIT (OUTPATIENT)
Dept: HOME HEALTH SERVICES | Facility: HOME HEALTH | Age: 73
End: 2023-03-04
Payer: MEDICARE

## 2023-03-04 ENCOUNTER — APPOINTMENT (OUTPATIENT)
Facility: HOSPITAL | Age: 73
End: 2023-03-04
Payer: MEDICARE

## 2023-03-04 ENCOUNTER — HOSPITAL ENCOUNTER (EMERGENCY)
Facility: HOSPITAL | Age: 73
Discharge: HOME | End: 2023-03-05
Attending: EMERGENCY MEDICINE
Payer: MEDICARE

## 2023-03-04 DIAGNOSIS — N18.6 ESRD ON DIALYSIS (HCC): ICD-10-CM

## 2023-03-04 DIAGNOSIS — E87.79 OTHER HYPERVOLEMIA: Primary | ICD-10-CM

## 2023-03-04 DIAGNOSIS — J96.21 ACUTE ON CHRONIC RESPIRATORY FAILURE WITH HYPOXIA (HCC): ICD-10-CM

## 2023-03-04 DIAGNOSIS — Z99.2 ESRD ON DIALYSIS (HCC): ICD-10-CM

## 2023-03-04 LAB
ANION GAP SERPL CALC-SCNC: 6 MMOL/L (ref 3–18)
BASOPHILS # BLD: 0 K/UL (ref 0–0.1)
BASOPHILS NFR BLD: 0 % (ref 0–2)
BUN SERPL-MCNC: 19 MG/DL (ref 7–18)
BUN/CREAT SERPL: 10 (ref 12–20)
CALCIUM SERPL-MCNC: 8.8 MG/DL (ref 8.5–10.1)
CHLORIDE SERPL-SCNC: 111 MMOL/L (ref 100–111)
CO2 SERPL-SCNC: 23 MMOL/L (ref 21–32)
CREAT SERPL-MCNC: 1.88 MG/DL (ref 0.6–1.3)
DIFFERENTIAL METHOD BLD: ABNORMAL
EOSINOPHIL # BLD: 0 K/UL (ref 0–0.4)
EOSINOPHIL NFR BLD: 0 % (ref 0–5)
ERYTHROCYTE [DISTWIDTH] IN BLOOD BY AUTOMATED COUNT: 18.1 % (ref 11.6–14.5)
FLUAV RNA SPEC QL NAA+PROBE: NOT DETECTED
FLUBV RNA SPEC QL NAA+PROBE: NOT DETECTED
GLUCOSE SERPL-MCNC: 160 MG/DL (ref 74–99)
HCT VFR BLD AUTO: 28.4 % (ref 35–45)
HGB BLD-MCNC: 8.5 G/DL (ref 12–16)
IMM GRANULOCYTES # BLD AUTO: 0.1 K/UL (ref 0–0.04)
IMM GRANULOCYTES NFR BLD AUTO: 1 % (ref 0–0.5)
LYMPHOCYTES # BLD: 0.9 K/UL (ref 0.9–3.6)
LYMPHOCYTES NFR BLD: 8 % (ref 21–52)
MCH RBC QN AUTO: 28.6 PG (ref 24–34)
MCHC RBC AUTO-ENTMCNC: 29.9 G/DL (ref 31–37)
MCV RBC AUTO: 95.6 FL (ref 78–100)
MONOCYTES # BLD: 0.8 K/UL (ref 0.05–1.2)
MONOCYTES NFR BLD: 7 % (ref 3–10)
NEUTS SEG # BLD: 9.7 K/UL (ref 1.8–8)
NEUTS SEG NFR BLD: 84 % (ref 40–73)
NRBC # BLD: 0.15 K/UL (ref 0–0.01)
NRBC BLD-RTO: 1.3 PER 100 WBC
PLATELET # BLD AUTO: 335 K/UL (ref 135–420)
PMV BLD AUTO: 9 FL (ref 9.2–11.8)
POTASSIUM SERPL-SCNC: 4.5 MMOL/L (ref 3.5–5.5)
RBC # BLD AUTO: 2.97 M/UL (ref 4.2–5.3)
SARS-COV-2 RNA RESP QL NAA+PROBE: NOT DETECTED
SODIUM SERPL-SCNC: 140 MMOL/L (ref 136–145)
TROPONIN I SERPL HS-MCNC: 59 NG/L (ref 0–54)
TROPONIN I SERPL HS-MCNC: 71 NG/L (ref 0–54)
WBC # BLD AUTO: 11.5 K/UL (ref 4.6–13.2)

## 2023-03-04 PROCEDURE — 85025 COMPLETE CBC W/AUTO DIFF WBC: CPT

## 2023-03-04 PROCEDURE — 87636 SARSCOV2 & INF A&B AMP PRB: CPT

## 2023-03-04 PROCEDURE — 2700000000 HC OXYGEN THERAPY PER DAY

## 2023-03-04 PROCEDURE — 99285 EMERGENCY DEPT VISIT HI MDM: CPT

## 2023-03-04 PROCEDURE — 93005 ELECTROCARDIOGRAM TRACING: CPT | Performed by: EMERGENCY MEDICINE

## 2023-03-04 PROCEDURE — 94761 N-INVAS EAR/PLS OXIMETRY MLT: CPT

## 2023-03-04 PROCEDURE — 80048 BASIC METABOLIC PNL TOTAL CA: CPT

## 2023-03-04 PROCEDURE — 6360000002 HC RX W HCPCS: Performed by: EMERGENCY MEDICINE

## 2023-03-04 PROCEDURE — 84484 ASSAY OF TROPONIN QUANT: CPT

## 2023-03-04 PROCEDURE — 96374 THER/PROPH/DIAG INJ IV PUSH: CPT

## 2023-03-04 PROCEDURE — 71045 X-RAY EXAM CHEST 1 VIEW: CPT

## 2023-03-04 PROCEDURE — 90935 HEMODIALYSIS ONE EVALUATION: CPT

## 2023-03-04 RX ORDER — ONDANSETRON 2 MG/ML
4 INJECTION INTRAMUSCULAR; INTRAVENOUS
Status: COMPLETED | OUTPATIENT
Start: 2023-03-04 | End: 2023-03-04

## 2023-03-04 RX ADMIN — ONDANSETRON 4 MG: 2 INJECTION INTRAMUSCULAR; INTRAVENOUS at 18:17

## 2023-03-04 ASSESSMENT — ENCOUNTER SYMPTOMS
SHORTNESS OF BREATH: 1
COUGH: 1
GASTROINTESTINAL NEGATIVE: 1

## 2023-03-04 NOTE — ED NOTES
Pt updated on plan to go to dialysis. Pt complaining of new nausea; requested nausea medication.      Geovany Real RN  03/04/23 1800

## 2023-03-04 NOTE — ED TRIAGE NOTES
Patient has hx of COPD, does not normally wear 02, only PRN.     SOB started this am    States she feels like she cant catch her breath     Placed on 2L NC for  comfort, states she feels better

## 2023-03-04 NOTE — ED PROVIDER NOTES
[facility] EMERGENCY DEPT  EMERGENCY DEPARTMENT ENCOUNTER      Pt Name: Nicole Torres  MRN: [id number]  [place] 1950  Date of evaluation: 3/4/2023  Provider: Sharda Coleman MD    CHIEF COMPLAINT       Chief Complaint   Patient presents with    Shortness of Breath         HISTORY OF PRESENT ILLNESS   (Location/Symptom, Timing/Onset, Context/Setting, Quality, Duration, Modifying Factors, Severity)  Note limiting factors. Nicole Torres is a 81 y.o. female who presents to the emergency department      62-year-old female presents emergency department with shortness of breath. Patient woke up today short of breath. She said that she is on dialysis initially was given dialysis 3 times a week but for the past few weeks she has been dialyzed only on Tuesdays. Has no chest pain no fevers or chills no COVID concerns. No other issues expressed at this time. The history is provided by the patient. Nursing Notes were reviewed. REVIEW OF SYSTEMS    (2-9 systems for level 4, 10 or more for level 5)     Review of Systems   Constitutional: Negative. HENT: Negative. Respiratory:  Positive for cough and shortness of breath. Cardiovascular: Negative. Gastrointestinal: Negative. Genitourinary: Negative. Neurological: Negative. All other systems reviewed and are negative. Except as noted above the remainder of the review of systems was reviewed and negative.        PAST MEDICAL HISTORY     Past Medical History:   Diagnosis Date    Abnormal WBC count 05/17/2016    Anemia     Bilateral shoulder pain 09/27/2016    Chondromalacia of both patellae     Chronic heart failure with preserved ejection fraction ([facility] 01/13/2023    Chronic kidney disease     COPD (chronic obstructive pulmonary disease) ([facility] 09/2015    mild-mod dz; Dr Quinteros Steve    Diabetes [facility]) 2013    Diabetic eye exam [facility]) 2016    Dialysis patient [facility])     T/Th/Sat at [address] [place] [address] [phone]    Dilated cardiomyopathy ([facility] Dyslipidemia     Gout     Heart attack ([facility] 10/18/2019    Stented    History of echocardiogram 11/14/2014    Mild LVE. EF 40%. Mild, diffuse hypk. Mild LAE. Mild MR. Hypercholesteremia 01/08/2014    Hypertension 2000    Noncompliance with medications 02/16/2016    Obesity     Orthostatic hypotension 05/17/2016    Osteoarthritis of both knees     Pain management 04/01/2016    Dr. Karan Hernandez     Popliteal cyst     Sleep apnea     not using cpap    Stage 3b chronic kidney disease ([facility] 08/08/2022    Vitamin D deficiency 10/16/2014         SURGICAL HISTORY       Past Surgical History:   Procedure Laterality Date    CARDIAC CATHETERIZATION  2019    Coronary stent    COLOSTOMY  01/18/2023    IR TUNNELED CATHETER PLACEMENT GREATER THAN 5 YEARS  9/21/2022    IR TUNNELED CATHETER PLACEMENT GREATER THAN 5 YEARS 9/21/2022 [facility] RAD ANGIO IR    IR TUNNELED CATHETER PLACEMENT GREATER THAN 5 YEARS  09/21/2022    OTHER SURGICAL HISTORY  01/18/2023    Exploratory laparotomy, sigmoid colectomy, and descending colostomy    TUBAL LIGATION           CURRENT MEDICATIONS       Discharge Medication List as of 3/5/2023  2:17 AM        CONTINUE these medications which have NOT CHANGED    Details   albuterol sulfate HFA (PROVENTIL;VENTOLIN;PROAIR) 108 (90 Base) MCG/ACT inhaler Inhale 2 puffs into the lungs every 6 hours as neededHistorical Med      atorvastatin (LIPITOR) 80 MG tablet Take 80 mg by mouthHistorical Med      docusate (COLACE, DULCOLAX) 100 MG CAPS Take 100 mg by mouth 2 times dailyHistorical Med      guaiFENesin (MUCINEX) 600 MG extended release tablet Take 600 mg by mouth 2 times dailyHistorical Med      aspirin 81 MG chewable tablet Take 162 mg by mouth dailyHistorical Med      gabapentin (NEURONTIN) 400 MG capsule Take 400 mg by mouth daily. Historical Med      hydrALAZINE (APRESOLINE) 100 MG tablet Take 100 mg by mouth 2 times dailyHistorical Med      insulin detemir (LEVEMIR) 100 UNIT/ML injection vial 15 units daily for diabetes once daily with lunchHistorical Med      ipratropium-albuterol (DUONEB) 0.5-2.5 (3) MG/3ML SOLN nebulizer solution Inhale 3 mLs into the lungs every 6 hours as neededHistorical Med      metoprolol tartrate (LOPRESSOR) 25 MG tablet Take 25 mg by mouth 2 times dailyHistorical Med      tiZANidine (ZANAFLEX) 4 MG tablet Take 4 mg by mouth 2 times daily as neededHistorical Med      Umeclidinium-Vilanterol 62.5-25 MCG/ACT AEPB Inhale 1 puff into the lungs dailyHistorical Med             ALLERGIES     Patient has no known allergies. FAMILY HISTORY       Family History   Problem Relation Age of Onset    Diabetes Mother     Hypertension Mother     Hypertension Father     Kidney Disease Maternal Aunt 48        Dialysis          SOCIAL HISTORY       Social History     Socioeconomic History    Marital status:    Tobacco Use    Smoking status: Former     Packs/day: 1.00     Types: Cigarettes     Quit date: 5/20/2019     Years since quitting: 3.8    Smokeless tobacco: Never   Substance and Sexual Activity    Alcohol use: No     Alcohol/week: 0.0 standard drinks    Drug use: Never       SCREENINGS         Hudson Coma Scale  Eye Opening: Spontaneous  Best Verbal Response: Oriented  Best Motor Response: Obeys commands  Hudson Coma Scale Score: 15                     CIWA Assessment  BP: (!) 161/97  Heart Rate: (!) 113                 PHYSICAL EXAM    (up to 7 for level 4, 8 or more for level 5)     ED Triage Vitals [03/04/23 1220]   BP Temp Temp src Heart Rate Resp SpO2 Height Weight   (!) 156/78 98.9 °F (37.2 °C) -- (!) 124 26 94 % -- --       Physical Exam  Vitals and nursing note reviewed. Constitutional:       General: She is in acute distress. Appearance: She is well-developed. She is not ill-appearing, toxic-appearing or diaphoretic. Interventions: She is not intubated. HENT:      Head: Normocephalic and atraumatic.       Mouth/Throat:      Pharynx: No pharyngeal swelling or oropharyngeal exudate. Cardiovascular:      Rate and Rhythm: Regular rhythm. Tachycardia present. Pulmonary:      Effort: Pulmonary effort is normal. Tachypnea present. No bradypnea, accessory muscle usage or respiratory distress. She is not intubated. Breath sounds: No stridor. Examination of the right-middle field reveals rales. Examination of the left-middle field reveals rales. Examination of the right-lower field reveals rales. Examination of the left-lower field reveals rales. Rales present. No decreased breath sounds. Chest:      Chest wall: No mass, deformity, tenderness, crepitus or edema. There is no dullness to percussion. Abdominal:      Palpations: Abdomen is soft. There is no hepatomegaly, splenomegaly or mass. Tenderness: There is no abdominal tenderness. There is no guarding or rebound. Musculoskeletal:         General: Normal range of motion. Cervical back: Normal range of motion. Right lower leg: No tenderness. No edema. Left lower leg: No tenderness. No edema. Skin:     General: Skin is warm. Capillary Refill: Capillary refill takes less than 2 seconds. Coloration: Skin is not cyanotic or pale. Findings: No ecchymosis, erythema or rash. Nails: There is no clubbing. Neurological:      General: No focal deficit present. Mental Status: She is alert and oriented to person, place, and time.        DIAGNOSTIC RESULTS     EKG: All EKG's are interpreted by the Emergency Department Physician who either signs or Co-signs this chart in the absence of a cardiologist.        RADIOLOGY:   Non-plain film images such as CT, Ultrasound and MRI are read by the radiologist. Plain radiographic images are visualized and preliminarily interpreted by the emergency physician with the below findings:        Interpretation per the Radiologist below, if available at the time of this note:    XR CHEST PORTABLE   Final Result      Pulmonary vascular congestion with mild interstitial and airspace opacity, right   greater than left, possibly pulmonary edema. Asymmetric increased airspace   disease in the right perihilar to lower lung could reflect asymmetric pulmonary   edema versus superimposed focal pulmonary infiltrate. Cardiomegaly. ED BEDSIDE ULTRASOUND:   Performed by ED Physician - none    LABS:  Labs Reviewed   CBC WITH AUTO DIFFERENTIAL - Abnormal; Notable for the following components:       Result Value    RBC 2.97 (*)     Hemoglobin 8.5 (*)     Hematocrit 28.4 (*)     MCHC 29.9 (*)     RDW 18.1 (*)     MPV 9.0 (*)     Nucleated RBCs 1.3 (*)     nRBC 0.15 (*)     Seg Neutrophils 84 (*)     Lymphocytes 8 (*)     Immature Granulocytes 1 (*)     Segs Absolute 9.7 (*)     Absolute Immature Granulocyte 0.1 (*)     All other components within normal limits   BASIC METABOLIC PANEL - Abnormal; Notable for the following components:    Glucose 160 (*)     BUN 19 (*)     Creatinine 1.88 (*)     Bun/Cre Ratio 10 (*)     Est, Glom Filt Rate 28 (*)     All other components within normal limits   TROPONIN - Abnormal; Notable for the following components:    Troponin, High Sensitivity 71 (*)     All other components within normal limits   TROPONIN - Abnormal; Notable for the following components:    Troponin, High Sensitivity 59 (*)     All other components within normal limits   COVID-19 & INFLUENZA COMBO       All other labs were within normal range or not returned as of this dictation. EMERGENCY DEPARTMENT COURSE and DIFFERENTIAL DIAGNOSIS/MDM:   Vitals:    Vitals:    03/05/23 0114 03/05/23 0145 03/05/23 0205 03/05/23 0220   BP: (!) 147/97 (!) 146/74  (!) 161/97   Pulse:   (!) 116 (!) 113   Resp:   14 19   Temp:    98.9 °F (37.2 °C)   TempSrc:    Oral   SpO2: (!) 87% 97% 93% 93%           Medical Decision Making  I discussed case with nephrology on-call he states he knows the patient very well and she is not compliant with her dialysis.   I am going to order 2 sets of troponins. His second 1 is pending. Seems like she could be dialyzed and would be able to go home. I plan on signing out this patient to the oncoming attending. If there is any other abnormalities I would keep her in the hospital.  Chest x-ray is consistent with acute. Acute pulmonary edema. Patient sinus tachycardic at 116 normal axis normal intervals. Mathews diagnosis chest pain shortness of breath ACS PE pneumothorax APE    Amount and/or Complexity of Data Reviewed  External Data Reviewed: radiology. Labs: ordered. Decision-making details documented in ED Course. Radiology: ordered. Decision-making details documented in ED Course. ECG/medicine tests: ordered and independent interpretation performed. Risk  OTC drugs. Prescription drug management. REASSESSMENT     ED Course as of 03/09/23 3845   Blas Pepe Mar 05, 2023   0149 Reassessed the patient after dialysis she is tachycardic slouched in bed says she does not feel good cramping in her back started during dialysis cramping in the legs. We will give a dose of Tylenol and have nurse get her up and out of bed as emergency department stretches tend to cause back pain. Does not appear imminently toxic [CB]      ED Course User Index  [CB] Unique Tran MD         CRITICAL CARE TIME   Total Critical Care time was  minutes, excluding separately reportable procedures. There was a high probability of clinically significant/life threatening deterioration in the patient's condition which required my urgent intervention. CONSULTS:  None    PROCEDURES:  Unless otherwise noted below, none     Procedures        FINAL IMPRESSION      1. Other hypervolemia    2. ESRD on dialysis ([facility]    3.  Acute on chronic respiratory failure with hypoxia [facility])          DISPOSITION/PLAN   DISPOSITION Decision To Discharge 03/05/2023 02:16:54 AM      PATIENT REFERRED TO:  Carmencita Hernandez MD  [facility] [address]  [phone]    In 2 days      DISCHARGE MEDICATIONS:  Discharge Medication List as of 3/5/2023  2:17 AM        Controlled Substances Monitoring:     No flowsheet data found. (Please note that portions of this note were completed with a voice recognition program.  Efforts were made to edit the dictations but occasionally words are mis-transcribed. )    Rocio Ross MD (electronically signed)  Attending Emergency Physician           Pauline Contreras MD  03/09/23 980

## 2023-03-05 VITALS
RESPIRATION RATE: 19 BRPM | SYSTOLIC BLOOD PRESSURE: 161 MMHG | OXYGEN SATURATION: 93 % | TEMPERATURE: 98.9 F | DIASTOLIC BLOOD PRESSURE: 97 MMHG | HEART RATE: 113 BPM

## 2023-03-05 LAB
EKG ATRIAL RATE: 116 BPM
EKG DIAGNOSIS: NORMAL
EKG P AXIS: 78 DEGREES
EKG P-R INTERVAL: 156 MS
EKG Q-T INTERVAL: 346 MS
EKG QRS DURATION: 76 MS
EKG QTC CALCULATION (BAZETT): 480 MS
EKG R AXIS: 41 DEGREES
EKG T AXIS: 79 DEGREES
EKG VENTRICULAR RATE: 116 BPM

## 2023-03-05 PROCEDURE — 6370000000 HC RX 637 (ALT 250 FOR IP): Performed by: EMERGENCY MEDICINE

## 2023-03-05 PROCEDURE — 90935 HEMODIALYSIS ONE EVALUATION: CPT

## 2023-03-05 RX ORDER — ACETAMINOPHEN 500 MG
1000 TABLET ORAL
Status: COMPLETED | OUTPATIENT
Start: 2023-03-05 | End: 2023-03-05

## 2023-03-05 RX ADMIN — ACETAMINOPHEN 1000 MG: 500 TABLET ORAL at 02:01

## 2023-03-05 ASSESSMENT — PAIN DESCRIPTION - DESCRIPTORS: DESCRIPTORS: ACHING;CRAMPING;THROBBING

## 2023-03-05 ASSESSMENT — PAIN DESCRIPTION - LOCATION: LOCATION: ANKLE;HIP;LEG

## 2023-03-05 NOTE — PROGRESS NOTES
2052 Patient arrived to dialysis unit by Poppy gonzalez  Delay to start of care d/t machine issues. 2145 Right CVC catheter accessed per protocol. Start of Hemodialysis treatment without complications. Patient tolerated well. 2230 Treatment ended 20 minutes early d/t c/o 10/10 left lower extremities cramping. Dr. Jan Hobson notified. UF removed 2938, Net UF removed 2438ml  3/5/2023. Patient reports the cramping has improved since the blood volume has been returned.   0045 Report called to [address] [place] [address]

## 2023-03-05 NOTE — CONSULTS
Pt presented to er,fluid overload  Hx of esrd,recovering renal function,her dialysis frequency was decreased,her last dialysis was Tuesday  Dialyze tonight  Return to her op unit on Tuesday  Evaluate for renal recovery,she should dialyze at least twice weekly

## 2023-03-05 NOTE — ED NOTES
ED continued care note    7:11 PM EST  Signed out to me by Dr Mony King, patient ESRD-D noncompliant at times, fluid overload on chest x-ray and clinically, is in line to go to dialysis. Had minimally elevated troponins, 50s up to 70s. Plan is after dialysis reassess third troponin and assess for possible discharge. Acutely stable at this time    ED Course as of 03/05/23 0217   Alina Shankar Mar 05, 2023   0149 Reassessed the patient after dialysis she is tachycardic slouched in bed says she does not feel good cramping in her back started during dialysis cramping in the legs. We will give a dose of Tylenol and have nurse get her up and out of bed as emergency department stretches tend to cause back pain. Does not appear imminently toxic [CB]      ED Course User Index  [CB] Marcelo Urbina MD     2:18 AM EST Pt reevaluated at this time. Discussed results and findings, as well as, diagnosis and plan for discharge. Follow up with doctors/services listed. Return to the emergency department for alarming symptoms. Pt verbalizes understanding and agreement with plan. All questions addressed. Patient up and mobilizes to her baseline, states usually gets Percocet for her back and leg pain. Has happened before during dialysis as it did this time. Tylenol will be sufficient. Tachycardia noted, doubt PE or sepsis. Likely combination of pain, debility and recovering from dialysis. ICD-10-CM    1. Other hypervolemia  E87.79       2. ESRD on dialysis ([facility]  N18.6     Z99.2       3.  Acute on chronic respiratory failure with hypoxia ([facility] [facility]  J96.21              Marcelo Urbina MD  03/05/23 5260

## 2023-03-06 ENCOUNTER — TELEPHONE (OUTPATIENT)
Age: 73
End: 2023-03-06

## 2023-03-06 ENCOUNTER — HOME CARE VISIT (OUTPATIENT)
Dept: SCHEDULING | Facility: HOME HEALTH | Age: 73
End: 2023-03-06
Payer: MEDICARE

## 2023-03-06 VITALS
HEART RATE: 86 BPM | DIASTOLIC BLOOD PRESSURE: 95 MMHG | RESPIRATION RATE: 20 BRPM | SYSTOLIC BLOOD PRESSURE: 160 MMHG | TEMPERATURE: 97.5 F | OXYGEN SATURATION: 98 %

## 2023-03-06 PROCEDURE — G0299 HHS/HOSPICE OF RN EA 15 MIN: HCPCS

## 2023-03-06 NOTE — TELEPHONE ENCOUNTER
Sam Jimenez the patients home health nurse called today and wanted to report that Ms. Da Cole was having 10/10 ostomy pain.

## 2023-03-06 NOTE — HOME HEALTH
Skilled reason for visit: ostomy care. Patient stated that she had to go ED over the weekend because she couldnt breathe, patient was dialysis sessions were recently reduced from 3 x week to 1x week. Patient stated she received a dialysis during ED visit and was sent home. No respiratory distress observed at this time. During visit patient c/o 9/10 abdominal pain to RLQ, ostomy to LUQ red and beefy with no s/s of infection. Loose brown stool noted. Positive bowel sounds with a lot of gas being expelled during visit. Asked patient did she take her colace today, she said no. Advised patient to take colace as prescribed. Patient also stated she has not checked her blood sugar in a few days. Educated patient on the importance of checking blood sugar BID as ordered. Patient verbalized understanding and stated she would check later on today because she wanted to make a few phone calls first. Call placed to Dr. Donivan Schwab office ([id number]) spoke with Iledfonso Arora who said Dr. Ean Aragon was working at the [employer] office today but she would send a message to the doctor regarding the patients status. Patient also notified nurse that she called her nephrologist about being unhappy with current dialysis center, stated MD informed her to stop going to dialysis center and to continue taking lasix BID until further notice. Patient is scheduled for a colonscopy, ostomy reversal and colostomy surgery on 3/10/23. Caregiver involvement:  assist with ADLs, meal prep and transportation. Medications reviewed and all medications are available in the home this visit. The following education was provided regarding medications:  SN instructed in possible adverse reactions to Lasix, including dehydration, orthostatic hypotension, loss of potassium and other electrolytes, weakness, and fatigue. Lasix is used to reduce extra fluid in the body (edema) caused by conditions such as heart failure, liver disease, and kidney disease.  This can lessen symptoms such as shortness of breath and swelling in your arms, legs, and abdomen. This drug is also used to treat high blood pressure. Lowering high blood pressure helps prevent strokes, heart attacks, and kidney problems. Lasix is a water pill (diuretic) that causes you to make more urine. This helps your body get rid of extra water and salt. .    MD notified of any discrepancies/look a-like medications/medication interactions: none  Medications are effective at this time. Home health supplies by type and quantity ordered/delivered this visit include: n/a    Patient education provided this visit: Instructed patient caregiver If you keep having pain and cramping with no output from your stoma for more than 2 hours, and you cant reach your doctor or ostomy nurse, go to the emergency room. Take all your ostomy supplies with you.     Sharps education provided: Clinician instructed patient/CG on proper disposal of sharps: Containers should be made of hard plastic, be puncture-resistant and leakproof,   such as a laundry detergent or bleach bottle.  When the container is ¾ full, it should be sealed with tape and labeled   DO NOT RECYCLE prior to discarding in the regular trash.        Patient level of understanding of education provided: patient verbalized understanding      Skilled Care Performed this visit: ostomy care, v/s    Patient response to procedure performed:  Patient tolerated w/o any increased level of pain      Agency Progress toward goals: progressing    Patient's Progress towards personal goals: progressing    Home exercise program: importance of compliance with medication regimen  follow up with MD as ordered,   safety and fall prevention  compliance wit diabetic diet  assessing diabetic feet daily  checking blood sugar daily      Continued need for the following skills: Nursing    Plan for next visit: ostomy care/teaching, disease and medication management    Patient and/or caregiver notified and agrees to changes in the Plan of Care YES/NO/NA: YES      The following discharge planning was discussed with the pt/caregiver: Discharge when goals are met, patient/caregiver able to manage disease process, medications, and pain

## 2023-03-06 NOTE — TELEPHONE ENCOUNTER
I called patient due to Home health nurse left message with Annelise Mckeon stating she was in a lot of pain. Patient states she was in pain earlier today. States it was not at the ostomy site but on right abd. Patient states she had not had a BM in 2 days and did not have a appetite. Patient states had 2 large BM's and felt much better. Patient states was also having issues with receiving Dialysis once a week. States was starting to have fluid build up but was told will be rx'd fluid pill to help with that. Patient also confirmed appt on file for 04/10/23. I advised patient if she need to be sooner for any reason please call office. I also advised patient I will notify Dr Matthew Berry feeling much better. Patient verbalized understanding. Alba Mendieta MA routed conversation to You; Vanessa Monae MD 2 hours ago (12:02 PM)     Alba Mendieta MA 2 hours ago (11:58 AM)     Berenice Pandey the patients home health nurse called today and wanted to report that Ms. Pee Yun was having 10/10 ostomy pain.            Note         Alvin Ball MA 3 hours ago (11:51 AM

## 2023-03-09 ENCOUNTER — ANESTHESIA EVENT (OUTPATIENT)
Dept: CARDIOTHORACIC SURGERY | Facility: HOSPITAL | Age: 73
End: 2023-03-09
Payer: MEDICARE

## 2023-03-09 ENCOUNTER — TELEPHONE (OUTPATIENT)
Age: 73
End: 2023-03-09

## 2023-03-09 NOTE — TELEPHONE ENCOUNTER
Patient called back to remind her about her procedure on 03/10/2023 at 8:00am about procedure scheduled on 03/10/2023 with Dr. Willard Persaud. Check in at [address] [place] [address] entrance  at 6:00am, NPO [place], Can take heart and blood pressure medication with a sip of water the morning of the procedure. Patient has to have someone to drop off and . Patient confirmed and repeated instructions.

## 2023-03-10 ENCOUNTER — HOME CARE VISIT (OUTPATIENT)
Dept: HOME HEALTH SERVICES | Facility: HOME HEALTH | Age: 73
End: 2023-03-10
Payer: MEDICARE

## 2023-03-10 ENCOUNTER — HOSPITAL ENCOUNTER (OUTPATIENT)
Facility: HOSPITAL | Age: 73
Setting detail: OUTPATIENT SURGERY
Discharge: HOME | End: 2023-03-10
Attending: STUDENT IN AN ORGANIZED HEALTH CARE EDUCATION/TRAINING PROGRAM | Admitting: STUDENT IN AN ORGANIZED HEALTH CARE EDUCATION/TRAINING PROGRAM
Payer: MEDICARE

## 2023-03-10 ENCOUNTER — ANESTHESIA (OUTPATIENT)
Dept: CARDIOTHORACIC SURGERY | Facility: HOSPITAL | Age: 73
End: 2023-03-10
Payer: MEDICARE

## 2023-03-10 VITALS
TEMPERATURE: 98.2 F | SYSTOLIC BLOOD PRESSURE: 125 MMHG | HEART RATE: 76 BPM | DIASTOLIC BLOOD PRESSURE: 79 MMHG | BODY MASS INDEX: 35.19 KG/M2 | RESPIRATION RATE: 18 BRPM | OXYGEN SATURATION: 94 % | WEIGHT: 205 LBS

## 2023-03-10 DIAGNOSIS — N18.6 ANEMIA DUE TO CHRONIC KIDNEY DISEASE, ON CHRONIC DIALYSIS (HCC): ICD-10-CM

## 2023-03-10 DIAGNOSIS — G89.4 CHRONIC PAIN SYNDROME: Primary | ICD-10-CM

## 2023-03-10 DIAGNOSIS — N18.6 ESRD (END STAGE RENAL DISEASE) ON DIALYSIS (HCC): ICD-10-CM

## 2023-03-10 DIAGNOSIS — D63.1 ANEMIA DUE TO CHRONIC KIDNEY DISEASE, ON CHRONIC DIALYSIS (HCC): ICD-10-CM

## 2023-03-10 DIAGNOSIS — Z99.2 ESRD (END STAGE RENAL DISEASE) ON DIALYSIS (HCC): ICD-10-CM

## 2023-03-10 DIAGNOSIS — Z99.2 ANEMIA DUE TO CHRONIC KIDNEY DISEASE, ON CHRONIC DIALYSIS (HCC): ICD-10-CM

## 2023-03-10 DIAGNOSIS — L98.8 FISTULA: ICD-10-CM

## 2023-03-10 LAB
ABO + RH BLD: NORMAL
ANION GAP SERPL CALC-SCNC: 6 MMOL/L (ref 3–18)
BLOOD GROUP ANTIBODIES SERPL: NORMAL
BUN SERPL-MCNC: 27 MG/DL (ref 7–18)
BUN/CREAT SERPL: 14 (ref 12–20)
CALCIUM SERPL-MCNC: 9.3 MG/DL (ref 8.5–10.1)
CHLORIDE SERPL-SCNC: 110 MMOL/L (ref 100–111)
CO2 SERPL-SCNC: 24 MMOL/L (ref 21–32)
CREAT SERPL-MCNC: 1.93 MG/DL (ref 0.6–1.3)
GLUCOSE BLD STRIP.AUTO-MCNC: 126 MG/DL (ref 70–110)
GLUCOSE BLD STRIP.AUTO-MCNC: 156 MG/DL (ref 70–110)
GLUCOSE SERPL-MCNC: 125 MG/DL (ref 74–99)
POTASSIUM SERPL-SCNC: 4.9 MMOL/L (ref 3.5–5.5)
SODIUM SERPL-SCNC: 140 MMOL/L (ref 136–145)
SPECIMEN EXP DATE BLD: NORMAL

## 2023-03-10 PROCEDURE — 3700000001 HC ADD 15 MINUTES (ANESTHESIA): Performed by: STUDENT IN AN ORGANIZED HEALTH CARE EDUCATION/TRAINING PROGRAM

## 2023-03-10 PROCEDURE — 6360000002 HC RX W HCPCS: Performed by: NURSE ANESTHETIST, CERTIFIED REGISTERED

## 2023-03-10 PROCEDURE — 2580000003 HC RX 258: Performed by: NURSE ANESTHETIST, CERTIFIED REGISTERED

## 2023-03-10 PROCEDURE — 7100000010 HC PHASE II RECOVERY - FIRST 15 MIN: Performed by: STUDENT IN AN ORGANIZED HEALTH CARE EDUCATION/TRAINING PROGRAM

## 2023-03-10 PROCEDURE — 6370000000 HC RX 637 (ALT 250 FOR IP): Performed by: NURSE ANESTHETIST, CERTIFIED REGISTERED

## 2023-03-10 PROCEDURE — 7100000011 HC PHASE II RECOVERY - ADDTL 15 MIN: Performed by: STUDENT IN AN ORGANIZED HEALTH CARE EDUCATION/TRAINING PROGRAM

## 2023-03-10 PROCEDURE — 82962 GLUCOSE BLOOD TEST: CPT

## 2023-03-10 PROCEDURE — 2500000003 HC RX 250 WO HCPCS: Performed by: ANESTHESIOLOGY

## 2023-03-10 PROCEDURE — 6360000002 HC RX W HCPCS: Performed by: STUDENT IN AN ORGANIZED HEALTH CARE EDUCATION/TRAINING PROGRAM

## 2023-03-10 PROCEDURE — 2709999900 HC NON-CHARGEABLE SUPPLY: Performed by: STUDENT IN AN ORGANIZED HEALTH CARE EDUCATION/TRAINING PROGRAM

## 2023-03-10 PROCEDURE — 7100000001 HC PACU RECOVERY - ADDTL 15 MIN: Performed by: STUDENT IN AN ORGANIZED HEALTH CARE EDUCATION/TRAINING PROGRAM

## 2023-03-10 PROCEDURE — 2720000010 HC SURG SUPPLY STERILE: Performed by: STUDENT IN AN ORGANIZED HEALTH CARE EDUCATION/TRAINING PROGRAM

## 2023-03-10 PROCEDURE — 80048 BASIC METABOLIC PNL TOTAL CA: CPT

## 2023-03-10 PROCEDURE — 3600000002 HC SURGERY LEVEL 2 BASE: Performed by: STUDENT IN AN ORGANIZED HEALTH CARE EDUCATION/TRAINING PROGRAM

## 2023-03-10 PROCEDURE — 3700000000 HC ANESTHESIA ATTENDED CARE: Performed by: STUDENT IN AN ORGANIZED HEALTH CARE EDUCATION/TRAINING PROGRAM

## 2023-03-10 PROCEDURE — 2580000003 HC RX 258: Performed by: STUDENT IN AN ORGANIZED HEALTH CARE EDUCATION/TRAINING PROGRAM

## 2023-03-10 PROCEDURE — 2500000003 HC RX 250 WO HCPCS: Performed by: STUDENT IN AN ORGANIZED HEALTH CARE EDUCATION/TRAINING PROGRAM

## 2023-03-10 PROCEDURE — 7100000000 HC PACU RECOVERY - FIRST 15 MIN: Performed by: STUDENT IN AN ORGANIZED HEALTH CARE EDUCATION/TRAINING PROGRAM

## 2023-03-10 PROCEDURE — 3600000012 HC SURGERY LEVEL 2 ADDTL 15MIN: Performed by: STUDENT IN AN ORGANIZED HEALTH CARE EDUCATION/TRAINING PROGRAM

## 2023-03-10 PROCEDURE — 64415 NJX AA&/STRD BRCH PLXS IMG: CPT | Performed by: ANESTHESIOLOGY

## 2023-03-10 PROCEDURE — 86901 BLOOD TYPING SEROLOGIC RH(D): CPT

## 2023-03-10 PROCEDURE — 6360000002 HC RX W HCPCS: Performed by: ANESTHESIOLOGY

## 2023-03-10 PROCEDURE — C1768 GRAFT, VASCULAR: HCPCS | Performed by: STUDENT IN AN ORGANIZED HEALTH CARE EDUCATION/TRAINING PROGRAM

## 2023-03-10 DEVICE — GRAFT VASC L45MM OD7MM ID6MM CLLGN BOV CAR ART ARTEGRFT: Type: IMPLANTABLE DEVICE | Site: ARM | Status: FUNCTIONAL

## 2023-03-10 RX ORDER — LIDOCAINE HYDROCHLORIDE 10 MG/ML
1 INJECTION, SOLUTION EPIDURAL; INFILTRATION; INTRACAUDAL; PERINEURAL
Status: DISCONTINUED | OUTPATIENT
Start: 2023-03-10 | End: 2023-03-10 | Stop reason: HOSPADM

## 2023-03-10 RX ORDER — TRAMADOL HYDROCHLORIDE 50 MG/1
50 TABLET ORAL EVERY 4 HOURS PRN
Qty: 18 TABLET | Refills: 0 | Status: SHIPPED | OUTPATIENT
Start: 2023-03-10 | End: 2023-03-13

## 2023-03-10 RX ORDER — FENTANYL CITRATE 50 UG/ML
INJECTION, SOLUTION INTRAMUSCULAR; INTRAVENOUS PRN
Status: DISCONTINUED | OUTPATIENT
Start: 2023-03-10 | End: 2023-03-10 | Stop reason: SDUPTHER

## 2023-03-10 RX ORDER — PROPOFOL 10 MG/ML
INJECTION, EMULSION INTRAVENOUS CONTINUOUS PRN
Status: DISCONTINUED | OUTPATIENT
Start: 2023-03-10 | End: 2023-03-10 | Stop reason: SDUPTHER

## 2023-03-10 RX ORDER — HEPARIN SODIUM 5000 [USP'U]/ML
INJECTION, SOLUTION INTRAVENOUS; SUBCUTANEOUS
Status: DISCONTINUED
Start: 2023-03-10 | End: 2023-03-10 | Stop reason: WASHOUT

## 2023-03-10 RX ORDER — DEXTROSE MONOHYDRATE 100 MG/ML
INJECTION, SOLUTION INTRAVENOUS CONTINUOUS PRN
Status: DISCONTINUED | OUTPATIENT
Start: 2023-03-10 | End: 2023-03-10 | Stop reason: HOSPADM

## 2023-03-10 RX ORDER — SODIUM CHLORIDE 0.9 % (FLUSH) 0.9 %
5-40 SYRINGE (ML) INJECTION EVERY 12 HOURS SCHEDULED
Status: DISCONTINUED | OUTPATIENT
Start: 2023-03-10 | End: 2023-03-10 | Stop reason: HOSPADM

## 2023-03-10 RX ORDER — ONDANSETRON 4 MG/1
4 TABLET, FILM COATED ORAL 3 TIMES DAILY PRN
Qty: 15 TABLET | Refills: 0 | Status: SHIPPED | OUTPATIENT
Start: 2023-03-10

## 2023-03-10 RX ORDER — HEPARIN SODIUM 1000 [USP'U]/ML
INJECTION, SOLUTION INTRAVENOUS; SUBCUTANEOUS PRN
Status: DISCONTINUED | OUTPATIENT
Start: 2023-03-10 | End: 2023-03-10 | Stop reason: SDUPTHER

## 2023-03-10 RX ORDER — FENTANYL CITRATE 50 UG/ML
25 INJECTION, SOLUTION INTRAMUSCULAR; INTRAVENOUS EVERY 5 MIN PRN
Status: DISCONTINUED | OUTPATIENT
Start: 2023-03-10 | End: 2023-03-10 | Stop reason: HOSPADM

## 2023-03-10 RX ORDER — HEPARIN SODIUM 200 [USP'U]/100ML
INJECTION, SOLUTION INTRAVENOUS CONTINUOUS PRN
Status: DISCONTINUED | OUTPATIENT
Start: 2023-03-10 | End: 2023-03-10 | Stop reason: HOSPADM

## 2023-03-10 RX ORDER — ROPIVACAINE HYDROCHLORIDE 2 MG/ML
INJECTION, SOLUTION EPIDURAL; INFILTRATION; PERINEURAL
Status: COMPLETED
Start: 2023-03-10 | End: 2023-03-10

## 2023-03-10 RX ORDER — ONDANSETRON 2 MG/ML
INJECTION INTRAMUSCULAR; INTRAVENOUS PRN
Status: DISCONTINUED | OUTPATIENT
Start: 2023-03-10 | End: 2023-03-10 | Stop reason: SDUPTHER

## 2023-03-10 RX ORDER — DEXAMETHASONE SODIUM PHOSPHATE 4 MG/ML
INJECTION, SOLUTION INTRA-ARTICULAR; INTRALESIONAL; INTRAMUSCULAR; INTRAVENOUS; SOFT TISSUE PRN
Status: DISCONTINUED | OUTPATIENT
Start: 2023-03-10 | End: 2023-03-10 | Stop reason: SDUPTHER

## 2023-03-10 RX ORDER — HEPARIN SODIUM 1000 [USP'U]/ML
INJECTION, SOLUTION INTRAVENOUS; SUBCUTANEOUS
Status: COMPLETED
Start: 2023-03-10 | End: 2023-03-10

## 2023-03-10 RX ORDER — MAGNESIUM SULFATE HEPTAHYDRATE 500 MG/ML
INJECTION, SOLUTION INTRAMUSCULAR; INTRAVENOUS PRN
Status: DISCONTINUED | OUTPATIENT
Start: 2023-03-10 | End: 2023-03-10 | Stop reason: SDUPTHER

## 2023-03-10 RX ORDER — SODIUM CHLORIDE 0.9 % (FLUSH) 0.9 %
5-40 SYRINGE (ML) INJECTION PRN
Status: DISCONTINUED | OUTPATIENT
Start: 2023-03-10 | End: 2023-03-10 | Stop reason: HOSPADM

## 2023-03-10 RX ORDER — SODIUM CHLORIDE 9 MG/ML
INJECTION, SOLUTION INTRAVENOUS
Status: COMPLETED
Start: 2023-03-10 | End: 2023-03-10

## 2023-03-10 RX ORDER — LIDOCAINE HYDROCHLORIDE 10 MG/ML
INJECTION, SOLUTION EPIDURAL; INFILTRATION; INTRACAUDAL; PERINEURAL PRN
Status: DISCONTINUED | OUTPATIENT
Start: 2023-03-10 | End: 2023-03-10 | Stop reason: HOSPADM

## 2023-03-10 RX ORDER — ROPIVACAINE HYDROCHLORIDE 2 MG/ML
INJECTION, SOLUTION EPIDURAL; INFILTRATION; PERINEURAL
Status: COMPLETED | OUTPATIENT
Start: 2023-03-10 | End: 2023-03-10

## 2023-03-10 RX ORDER — DEXMEDETOMIDINE HYDROCHLORIDE 100 UG/ML
INJECTION, SOLUTION INTRAVENOUS
Status: COMPLETED | OUTPATIENT
Start: 2023-03-10 | End: 2023-03-10

## 2023-03-10 RX ORDER — FAMOTIDINE 20 MG/1
20 TABLET, FILM COATED ORAL ONCE
Status: COMPLETED | OUTPATIENT
Start: 2023-03-10 | End: 2023-03-10

## 2023-03-10 RX ORDER — SODIUM CHLORIDE 9 MG/ML
INJECTION, SOLUTION INTRAVENOUS PRN
Status: DISCONTINUED | OUTPATIENT
Start: 2023-03-10 | End: 2023-03-10 | Stop reason: HOSPADM

## 2023-03-10 RX ORDER — INSULIN LISPRO 100 [IU]/ML
0-15 INJECTION, SOLUTION INTRAVENOUS; SUBCUTANEOUS ONCE
Status: COMPLETED | OUTPATIENT
Start: 2023-03-10 | End: 2023-03-10

## 2023-03-10 RX ORDER — SODIUM CHLORIDE 9 MG/ML
INJECTION, SOLUTION INTRAVENOUS CONTINUOUS PRN
Status: DISCONTINUED | OUTPATIENT
Start: 2023-03-10 | End: 2023-03-10 | Stop reason: SDUPTHER

## 2023-03-10 RX ORDER — HEPARIN SODIUM 200 [USP'U]/100ML
INJECTION, SOLUTION INTRAVENOUS
Status: DISCONTINUED
Start: 2023-03-10 | End: 2023-03-10 | Stop reason: HOSPADM

## 2023-03-10 RX ORDER — FENTANYL CITRATE 50 UG/ML
50 INJECTION, SOLUTION INTRAMUSCULAR; INTRAVENOUS EVERY 5 MIN PRN
Status: DISCONTINUED | OUTPATIENT
Start: 2023-03-10 | End: 2023-03-10 | Stop reason: HOSPADM

## 2023-03-10 RX ORDER — SODIUM CHLORIDE 9 MG/ML
INJECTION, SOLUTION INTRAVENOUS CONTINUOUS
Status: DISCONTINUED | OUTPATIENT
Start: 2023-03-10 | End: 2023-03-10 | Stop reason: HOSPADM

## 2023-03-10 RX ADMIN — FENTANYL CITRATE 50 MCG: 50 INJECTION, SOLUTION INTRAMUSCULAR; INTRAVENOUS at 09:14

## 2023-03-10 RX ADMIN — HEPARIN SODIUM 7500 UNITS: 1000 INJECTION, SOLUTION INTRAVENOUS; SUBCUTANEOUS at 09:13

## 2023-03-10 RX ADMIN — WATER 2000 MG: 1 INJECTION INTRAMUSCULAR; INTRAVENOUS; SUBCUTANEOUS at 08:17

## 2023-03-10 RX ADMIN — FAMOTIDINE 20 MG: 20 TABLET ORAL at 07:12

## 2023-03-10 RX ADMIN — ROPIVACAINE HYDROCHLORIDE 20 ML: 2 INJECTION, SOLUTION EPIDURAL; INFILTRATION at 08:08

## 2023-03-10 RX ADMIN — PROPOFOL 40 MG: 10 INJECTION, EMULSION INTRAVENOUS at 08:18

## 2023-03-10 RX ADMIN — ONDANSETRON 4 MG: 2 INJECTION INTRAMUSCULAR; INTRAVENOUS at 08:15

## 2023-03-10 RX ADMIN — SODIUM CHLORIDE: 9 INJECTION, SOLUTION INTRAVENOUS at 07:12

## 2023-03-10 RX ADMIN — DEXMEDETOMIDINE HYDROCHLORIDE 8 MCG: 100 INJECTION, SOLUTION INTRAVENOUS at 11:30

## 2023-03-10 RX ADMIN — DEXMEDETOMIDINE HYDROCHLORIDE 0.5 ML: 100 INJECTION, SOLUTION INTRAVENOUS at 08:08

## 2023-03-10 RX ADMIN — DEXAMETHASONE SODIUM PHOSPHATE 4 MG: 4 INJECTION, SOLUTION INTRAMUSCULAR; INTRAVENOUS at 08:15

## 2023-03-10 RX ADMIN — FENTANYL CITRATE 50 MCG: 50 INJECTION, SOLUTION INTRAMUSCULAR; INTRAVENOUS at 08:15

## 2023-03-10 RX ADMIN — MAGNESIUM SULFATE HEPTAHYDRATE 1 G: 500 INJECTION, SOLUTION INTRAMUSCULAR; INTRAVENOUS at 08:17

## 2023-03-10 RX ADMIN — INSULIN LISPRO 3 UNITS: 100 INJECTION, SOLUTION INTRAVENOUS; SUBCUTANEOUS at 12:58

## 2023-03-10 RX ADMIN — PROPOFOL 100 MCG/KG/MIN: 10 INJECTION, EMULSION INTRAVENOUS at 08:15

## 2023-03-10 RX ADMIN — DEXMEDETOMIDINE HYDROCHLORIDE 4 MCG: 100 INJECTION, SOLUTION INTRAVENOUS at 08:15

## 2023-03-10 RX ADMIN — SODIUM CHLORIDE: 900 INJECTION, SOLUTION INTRAVENOUS at 08:15

## 2023-03-10 RX ADMIN — DEXMEDETOMIDINE HYDROCHLORIDE 4 MCG: 100 INJECTION, SOLUTION INTRAVENOUS at 11:02

## 2023-03-10 ASSESSMENT — PAIN - FUNCTIONAL ASSESSMENT: PAIN_FUNCTIONAL_ASSESSMENT: 0-10

## 2023-03-10 ASSESSMENT — ENCOUNTER SYMPTOMS: SHORTNESS OF BREATH: 1

## 2023-03-10 NOTE — DISCHARGE INSTRUCTIONS
Hemodialysis Access Surgery: What to Expect at [facility]  Hemodialysis is a way to remove wastes from the blood when your kidneys can no longer do the job. It's not a cure, but it can help you live longer and feel better. It's a lifesaving treatment when you have kidney failure. Hemodialysis is often called dialysis. Your doctor created a place (called an access) in your arm for your blood to flow in and out of your body during your dialysis sessions. Your arm will probably be bruised and swollen. It may hurt. The cut (incision) may bleed. The pain and bleeding will get better over several days. You will probably need only over-the-counter pain medicine. You can reduce swelling by propping up your arm on 1 or 2 pillows and keeping your elbow straight. You will have stitches. These may dissolve on their own, or your doctor will tell you when to come in to have them removed. You should also be able to return to work in a few days. You may feel some coolness or numbness in your hand. These feelings usually go away in a few weeks. Your doctor may suggest squeezing a soft object. This will strengthen your access and may make hemodialysis faster and easier. You should always be able to feel blood rushing through the fistula or graft. It feels like a slight vibration when you put your fingers on the skin over the fistula or graft. This feeling is called a thrill or a pulse. This care sheet gives you a general idea about how long it will take for you to recover. But each person recovers at a different pace. Follow the steps below to get better as quickly as possible. How can you care for yourself at home? Activity    Rest when you feel tired. Getting enough sleep will help you recover. Do not lie on or sleep on the arm with the access. Avoid activities such as washing windows or gardening that put stress on the arm with the access.      You may use your arm, but do not lift anything that weighs more than about 15 pounds. This may include a child, heavy grocery bags, a heavy briefcase or backpack, cat litter or dog food bags, or a vacuum . You can shower, but keep the access dry for the first 2 days. Cover the area with a plastic bag to keep it dry. Do not soak or scrub the incision until it has healed. Wear an arm guard to protect the area if you play sports or work with your arms. You may drive when your doctor says it is okay. This is usually in 1 to 2 days. Most people are able to return to work about 1 or 2 days after surgery. Diet    Follow an eating plan that is good for your kidneys. A registered dietitian can help you make a meal plan that is right for you. You may need to limit protein, salt, fluids, and certain foods. Medicines    Your doctor will tell you if and when you can restart your medicines. You will also be given instructions about taking any new medicines. If you stopped taking aspirin or some other blood thinner, your doctor will tell you when to start taking it again. Take pain medicines exactly as directed. If the doctor gave you a prescription medicine for pain, take it as prescribed. If you are not taking a prescription pain medicine, ask your doctor if you can take acetaminophen (Tylenol). Do not take ibuprofen (Advil, Motrin) or naproxen (Aleve), or similar medicines, unless your doctor tells you to. They may make chronic kidney disease worse. Do not take two or more pain medicines at the same time unless the doctor told you to. Many pain medicines have acetaminophen, which is Tylenol. Too much acetaminophen (Tylenol) can be harmful. If you think your pain medicine is making you sick to your stomach: Take your medicine after meals (unless your doctor has told you not to). Ask your doctor for a different pain medicine. If your doctor prescribed antibiotics, take them as directed. Do not stop taking them just because you feel better. You need to take the full course of antibiotics. Incision care    Keep the area dry for 2 days. After 2 days, wash the area with soap and water every day, and always before dialysis. Do not soak or scrub the incision until it has healed. If you have a bandage, change it every day or as your doctor recommends. Your doctor will tell you when you can remove it. Exercise    Squeeze a soft ball or other object as your doctor tells you. This will help blood flow through the access and help prevent blood clots. Elevation    Prop up the sore arm on a pillow anytime you sit or lie down during the next 3 days. Try to keep it above the level of your heart. This will help reduce swelling. Other instructions    Every day, check your access for a pulse or thrill in the fistula or graft area. A thrill is a vibration. To feel a pulse or thrill, place the first two fingers of your hand over the access. Do not bump your arm. Do not wear tight clothing, jewelry, or anything else that may squeeze the access. Use your other arm to have blood drawn or blood pressure taken. Do not put cream or lotion on or near the access. Make sure all doctors you deal with know that you have a vascular access. Follow-up care is a key part of your treatment and safety. Be sure to make and go to all appointments, and call your doctor if you are having problems. It's also a good idea to know your test results and keep a list of the medicines you take. When should you call for help? Call 911 anytime you think you may need emergency care. For example, call if:    You passed out (lost consciousness). You have chest pain, are short of breath, or cough up blood. Call your doctor now or seek immediate medical care if:    Your hand or arm is cold or dark-colored. You have no pulse in your access. You have nausea or you vomit for more than four hours.      You have pain that does not get better after you take pain medicine. You have loose stitches, or your incision comes open. You are bleeding from the incision. You have signs of infection, such as: Increased pain, swelling, warmth, or redness. Red streaks leading from the area. Pus draining from the area. A fever. You have signs of a blood clot in your leg (called a deep vein thrombosis), such as:  Pain in your calf, back of the knee, thigh, or groin. Redness or swelling in your leg. Watch closely for changes in your health, and be sure to contact your doctor if you have any problems. Where can you learn more? Go to http://www.woods.com/ and enter P616 to learn more about \"Hemodialysis Access Surgery: What to Expect at Home. \"  Current as of: May 4, 2022               Content Version: 13.5  © 2006-2022 [employer]. Care instructions adapted under license by [facility] ([facility]). If you have questions about a medical condition or this instruction, always ask your healthcare professional. [place] [address] any warranty or liability for your use of this information. DISCHARGE SUMMARY from Nurse    PATIENT INSTRUCTIONS:    After general anesthesia or intravenous sedation, for 24 hours or while taking prescription Narcotics:  Limit your activities  Do not drive and operate hazardous machinery  Do not make important personal or business decisions  Do  not drink alcoholic beverages  If you have not urinated within 8 hours after discharge, please contact your surgeon on call.     Report the following to your surgeon:  Excessive pain, swelling, redness or odor of or around the surgical area  Temperature over 100.5  Nausea and vomiting lasting longer than 4 hours or if unable to take medications  Any signs of decreased circulation or nerve impairment to extremity: change in color, persistent  numbness, tingling, coldness or increase pain  Any questions    These are general instructions for a healthy lifestyle:    No smoking/ No tobacco products/ Avoid exposure to second hand smoke  Surgeon General's Warning:  Quitting smoking now greatly reduces serious risk to your health. Obesity, smoking, and sedentary lifestyle greatly increases your risk for illness    A healthy diet, regular physical exercise & weight monitoring are important for maintaining a healthy lifestyle    You may be retaining fluid if you have a history of heart failure or if you experience any of the following symptoms:  Weight gain of 3 pounds or more overnight or 5 pounds in a week, increased swelling in our hands or feet or shortness of breath while lying flat in bed. Please call your doctor as soon as you notice any of these symptoms; do not wait until your next office visit. The discharge information has been reviewed with the patient and spouse. The patient and spouse verbalized understanding. Discharge medications reviewed with the patient and spouse and appropriate educational materials and side effects teaching were provided.   ___________________________________________________________________________________________________________________________________

## 2023-03-10 NOTE — ANESTHESIA POSTPROCEDURE EVALUATION
Department of Anesthesiology  Postprocedure Note    Patient: Stephanie Sutton  MRN: [id number]  YOB: 1950  Date of evaluation: 3/10/2023      Procedure Summary     Date: 03/10/23 Room / Location: [facility] CVT 02 / [facility] CARDIAC SURGERY    Anesthesia Start: 0800 Anesthesia Stop: 1203    Procedures:       LEFT ARM BRACHIAL ARTERY TO AXILLARY VEIN ARTERIO VENOUS GRAFT WITH ARTEGRAFT (Left: Arm Upper)      NERVE BLOCK (DEFINE) (Left: Arm Upper) Diagnosis:       ESRD (end stage renal disease) ([facility] [address]      (ESRD (end stage renal disease) ([facility] [address] [N18.6])    Surgeons: Viola Weinstein MD Responsible Provider: Diane Villasenor MD    Anesthesia Type: MAC ASA Status: 4          Anesthesia Type: MAC    Surya Phase I:      Surya Phase II:        Anesthesia Post Evaluation    Patient location during evaluation: PACU  Patient participation: complete - patient participated  Level of consciousness: awake  Airway patency: patent  Nausea & Vomiting: no nausea  Complications: no  Cardiovascular status: blood pressure returned to baseline  Respiratory status: acceptable  Hydration status: euvolemic  Comments: Pt complained of red and irritated Left eye, with some discomfort. On exam, she has slight ptosis, and erythema of conjunctiva, but no obvious trauma or abnormality. Prior irrigation of eye without much relief. Lacrilube placed in eye to some relief. Pt counseled to continue with ointment, go to ED if discomfort is unbearable. 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 She is starting to move fingers, so block diminishing. Will likely resolve with block resolution.

## 2023-03-10 NOTE — ANESTHESIA POSTPROCEDURE EVALUATION
Department of Anesthesiology  Postprocedure Note    Patient: Alexandro Bran  MRN: [id number]  YOB: 1950  Date of evaluation: 3/10/2023      Procedure Summary     Date: 03/10/23 Room / Location: [facility] CVT 02 / [facility] CARDIAC SURGERY    Anesthesia Start: 0800 Anesthesia Stop: 1203    Procedures:       LEFT ARM BRACHIAL ARTERY TO AXILLARY VEIN ARTERIO VENOUS GRAFT WITH ARTEGRAFT (Left: Arm Upper)      NERVE BLOCK (DEFINE) (Left: Arm Upper) Diagnosis:       ESRD (end stage renal disease) ([facility] [address]      (ESRD (end stage renal disease) ([facility] [address] [N18.6])    Surgeons: Paris Paez MD Responsible Provider: Marielle Maynard MD    Anesthesia Type: MAC ASA Status: 4          Anesthesia Type: MAC    Surya Phase I: Surya Score: 9    Surya Phase II:        Anesthesia Post Evaluation    Patient location during evaluation: PACU  Patient participation: complete - patient participated  Level of consciousness: awake  Airway patency: patent  Nausea & Vomiting: no nausea  Complications: no  Cardiovascular status: blood pressure returned to baseline  Respiratory status: acceptable  Hydration status: euvolemic

## 2023-03-10 NOTE — ANESTHESIA PROCEDURE NOTES
Peripheral Block    Patient location during procedure: OR  Reason for block: post-op pain management and at surgeon's request  Start time: 3/10/2023 8:08 AM  End time: 3/10/2023 8:10 AM  Staffing  Performed: anesthesiologist   Preanesthetic Checklist  Completed: patient identified, IV checked, site marked, risks and benefits discussed, surgical/procedural consents, equipment checked, pre-op evaluation, timeout performed, anesthesia consent given, oxygen available, monitors applied/VS acknowledged, fire risk safety assessment completed and verbalized and blood product R/B/A discussed and consented  Peripheral Block   Patient position: sitting  Prep: ChloraPrep  Provider prep: mask, sterile gloves and sterile gown  Patient monitoring: cardiac monitor, continuous pulse ox, continuous capnometry, frequent blood pressure checks, IV access, oxygen and responsive to questions  Block type: Brachial plexus  Supraclavicular  Laterality: left  Injection technique: single-shot  Guidance: ultrasound guided    Needle   Needle type: insulated echogenic nerve stimulator needle   Needle gauge: 21 G  Needle localization: ultrasound guidance and anatomical landmarks  Needle length: 5 cm  Assessment   Injection assessment: negative aspiration for heme, no paresthesia on injection, local visualized surrounding nerve on ultrasound and no intravascular symptoms  Paresthesia pain: none  Slow fractionated injection: yes  Hemodynamics: stable  Real-time US image taken/store: yes  Outcomes: uncomplicated and patient tolerated procedure well    Medications Administered  ropivacaine (NAROPIN) injection 0.2% - Perineural   20 mL - 3/10/2023 8:08:00 AM  dexmedetomidine 200 MCG/2ML - Perineural   0.5 mL - 3/10/2023 8:08:00 AM

## 2023-03-10 NOTE — OP NOTE
Operative Note      Patient: Selena Aguilar  YOB: 1950  MRN: [id number]    Date of Procedure: 3/10/2023    Pre-Op Diagnosis: ESRD (end stage renal disease) ([facility] [N18.6]    Post-Op Diagnosis: Same       Procedure(s):  LEFT ARM BRACHIAL ARTERY TO AXILLARY VEIN ARTERIO VENOUS GRAFT WITH ARTEGRAFT  NERVE BLOCK (DEFINE)    Surgeon(s):  Leonel Villalobos MD    Assistant:   Surgical Assistant: Darroll Duverney    Anesthesia: Monitor Anesthesia Care    Estimated Blood Loss (mL): less than 50     Complications: None    Specimens:   * No specimens in log *    Implants:  Implant Name Type Inv. Item Serial No.  Lot No. LRB No. Used Action   GRAFT VASC L45MM OD7MM [id number] CLLGN BOV CAR ART ARTEGRFT - [id number] Vascular grafts GRAFT VASC L45MM OD7MM [id number] CLLGN BOV CAR ART ARTEGRFT  [employer]WD [id number] Left 1 Implanted         Drains: * No LDAs found *    Findings:     Uncomplicated left arm brachial artery to axillary vein 6mm artegraft arteriovenous graft creation    Detailed Description of Procedure:     Patient was brought to the operating room, informed consent had been obtained, regional nerve block was administered by anesthesia. Left arm was prepped and draped in the usual sterile manner. Time out was performed. I began the procedure by making a 6cm longitudinal incision overlying the axillary sheath in the medial arm using a #10 blade. Dissection was carried with an electrocautery down to the axillary sheath. Next the axillary sheath was sharply entered and the left axillary vein was circumferentially dissected. Next a transverse 5cm incision was made overlying the left antecubital brachial pulse using a #10 blade. Dissection was carried down to the bicipital aponeurosis using electrocautery. Next, the bicipital aponeurosis was sharply divided and systemic heparin was administered. Next the brachial artery was circumferentially dissected.      Using a chris-kae tunneler, a subcutaneous tunnel was created and a 6mm artegraft, flushed and marked was delivered through the tunnel. Next, the clamps were placed on the brachial artery and a 6mm arteriotomy was made. An end to side 6-0 prolene anastomosis was fashioned in a heel to toe manner. Graft was flushed through post closure of the suture line and brachial artery clamps were removed and the graft was clamped. Next, the axillary vein was clamped and a 1cm venotomy was made. The artegraft was cut to size and an end to side graft to vein 5-0 prolene anastomosis was fashioned in a heel to toe manner. Graft was flushed prior to completion of the suture line. Doppler confirmed radial artery and brachial flow with an without graft compression. Wound bed was hemostatic and the wound was closed in layers of 3-0 vicryl and 4-0 monocryl and sterile dressings were applied. Excellent thrill noted in the graft. Patient tolerated the procedure well.      Electronically signed by Renella Mohs, MD on 3/10/2023 at 11:12 AM

## 2023-03-10 NOTE — H&P
Vascular Surgery      Patient: Salinas Kern MRN: [id number]  CSN: [id number]      YOB: 1950    Age: 81 y.o. Sex: female      DOA: 3/10/2023       HPI:     Salinas Kern is a 81 y.o. female who presents today for creation of left arm brachial artery to axillary vein artergraft arteriovenous graft creation. Patient has had multiple right arm procedures previously by other vascular surgeons. Patient is right hand dominant. Past Medical History:   Diagnosis Date    Abnormal WBC count 05/17/2016    Anemia     Bilateral shoulder pain 09/27/2016    Chondromalacia of both patellae     Chronic heart failure with preserved ejection fraction ([facility] 01/13/2023    Chronic kidney disease     COPD (chronic obstructive pulmonary disease) ([facility] 09/2015    mild-mod dz; Dr Karol Bah    Diabetes [facility]) 2013    Diabetic eye exam [facility]) 2016    Dialysis patient [facility])     T/Th/Sat at [address] [place] [address] Av [phone]    Dilated cardiomyopathy ([facility]     Dyslipidemia     Gout     Heart attack ([facility] 10/18/2019    Stented    History of echocardiogram 11/14/2014    Mild LVE. EF 40%. Mild, diffuse hypk. Mild LAE. Mild MR.       Hypercholesteremia 01/08/2014    Hypertension 2000    Noncompliance with medications 02/16/2016    Obesity     Orthostatic hypotension 05/17/2016    Osteoarthritis of both knees     Pain management 04/01/2016    Dr. Francis John     Popliteal cyst     Sleep apnea     not using cpap    Stage 3b chronic kidney disease ([facility] 08/08/2022    Vitamin D deficiency 10/16/2014       Past Surgical History:   Procedure Laterality Date    CARDIAC CATHETERIZATION  2019    Coronary stent    COLOSTOMY  01/18/2023    IR TUNNELED CATHETER PLACEMENT GREATER THAN 5 YEARS  9/21/2022    IR TUNNELED CATHETER PLACEMENT GREATER THAN 5 YEARS 9/21/2022 [facility] RAD ANGIO IR    IR TUNNELED CATHETER PLACEMENT GREATER THAN 5 YEARS  09/21/2022    OTHER SURGICAL HISTORY  01/18/2023    Exploratory laparotomy, sigmoid colectomy, and descending colostomy    TUBAL LIGATION         Family History   Problem Relation Age of Onset    Diabetes Mother     Hypertension Mother     Hypertension Father     Kidney Disease Maternal Aunt 48        Dialysis       Social History     Socioeconomic History    Marital status:      Spouse name: None    Number of children: None    Years of education: None    Highest education level: None   Tobacco Use    Smoking status: Former     Packs/day: 1.00     Types: Cigarettes     Quit date: 5/20/2019     Years since quitting: 3.8    Smokeless tobacco: Never   Substance and Sexual Activity    Alcohol use: No     Alcohol/week: 0.0 standard drinks    Drug use: Never       Prior to Admission medications    Medication Sig Start Date End Date Taking? Authorizing Provider   albuterol sulfate HFA (PROVENTIL;VENTOLIN;PROAIR) 108 (90 Base) MCG/ACT inhaler Inhale 2 puffs into the lungs every 6 hours as needed 2/3/23   Ar Automatic Reconciliation   atorvastatin (LIPITOR) 80 MG tablet Take 80 mg by mouth 1/9/23   Ar Automatic Reconciliation   docusate (COLACE, DULCOLAX) 100 MG CAPS Take 100 mg by mouth 2 times daily 1/25/23   Ar Automatic Reconciliation   guaiFENesin (MUCINEX) 600 MG extended release tablet Take 600 mg by mouth 2 times daily 2/3/23   Ar Automatic Reconciliation   aspirin 81 MG chewable tablet Take 162 mg by mouth daily 7/19/19   Ar Automatic Reconciliation   gabapentin (NEURONTIN) 400 MG capsule Take 400 mg by mouth daily.     Ar Automatic Reconciliation   hydrALAZINE (APRESOLINE) 100 MG tablet Take 100 mg by mouth 2 times daily 10/31/22   Ar Automatic Reconciliation   insulin detemir (LEVEMIR) 100 UNIT/ML injection vial 15 units daily for diabetes once daily with lunch 9/22/22   Ar Automatic Reconciliation   ipratropium-albuterol (DUONEB) 0.5-2.5 (3) MG/3ML SOLN nebulizer solution Inhale 3 mLs into the lungs every 6 hours as needed 3/23/21   Ar Automatic Reconciliation   metoprolol tartrate (LOPRESSOR) 25 MG tablet Take 25 mg by mouth 2 times daily 10/8/22   Ar Automatic Reconciliation   tiZANidine (ZANAFLEX) 4 MG tablet Take 4 mg by mouth 2 times daily as needed 11/14/22   Ar Automatic Reconciliation   Umeclidinium-Vilanterol 62.5-25 MCG/ACT AEPB Inhale 1 puff into the lungs daily 4/26/17   Ar Automatic Reconciliation       No Known Allergies    Review of Systems  Review of Systems - Negative except HPI      Physical Exam:      Visit Vitals  BP (!) 154/64   Pulse 74   Temp 98.2 °F (36.8 °C) (Oral)   Resp 20   Wt 205 lb (93 kg)   SpO2 100%   BMI 35.19 kg/m²       Physical Exam:  Pertinent items are noted in HPI. Assessment/Plan     71yo F here for left arm brachial artery to axillary vein arteriovenous graft creation with artegraft    Active Problems:    * No active hospital problems. *  Resolved Problems:    * No resolved hospital problems.  Alissa Reese MD  March 10, 2023

## 2023-03-10 NOTE — ANESTHESIA PRE PROCEDURE
Department of Anesthesiology  Preprocedure Note       Name:  Shona Catalan   Age:  81 y.o.  :  1950                                          MRN:  [id number]         Date:  3/10/2023      Surgeon: Giuliano Lux):  Marie Hernandez MD    Procedure: Procedure(s):  LEFT ARM BRACHIAL ARTERY TO AXILLARY VEIN ARTERIO VENOUS GRAFT WITH ARTEGRAFT    Medications prior to admission:   Prior to Admission medications    Medication Sig Start Date End Date Taking? Authorizing Provider   albuterol sulfate HFA (PROVENTIL;VENTOLIN;PROAIR) 108 (90 Base) MCG/ACT inhaler Inhale 2 puffs into the lungs every 6 hours as needed 2/3/23   Ar Automatic Reconciliation   atorvastatin (LIPITOR) 80 MG tablet Take 80 mg by mouth 23   Ar Automatic Reconciliation   docusate (COLACE, DULCOLAX) 100 MG CAPS Take 100 mg by mouth 2 times daily 23   Ar Automatic Reconciliation   guaiFENesin (MUCINEX) 600 MG extended release tablet Take 600 mg by mouth 2 times daily 2/3/23   Ar Automatic Reconciliation   aspirin 81 MG chewable tablet Take 162 mg by mouth daily 19   Ar Automatic Reconciliation   gabapentin (NEURONTIN) 400 MG capsule Take 400 mg by mouth daily.     Ar Automatic Reconciliation   hydrALAZINE (APRESOLINE) 100 MG tablet Take 100 mg by mouth 2 times daily 10/31/22   Ar Automatic Reconciliation   insulin detemir (LEVEMIR) 100 UNIT/ML injection vial 15 units daily for diabetes once daily with lunch 22   Ar Automatic Reconciliation   ipratropium-albuterol (DUONEB) 0.5-2.5 (3) MG/3ML SOLN nebulizer solution Inhale 3 mLs into the lungs every 6 hours as needed 3/23/21   Ar Automatic Reconciliation   metoprolol tartrate (LOPRESSOR) 25 MG tablet Take 25 mg by mouth 2 times daily 10/8/22   Ar Automatic Reconciliation   tiZANidine (ZANAFLEX) 4 MG tablet Take 4 mg by mouth 2 times daily as needed 22   Ar Automatic Reconciliation   Umeclidinium-Vilanterol 62.5-25 MCG/ACT AEPB Inhale 1 puff into the lungs daily 17   Ar Automatic Reconciliation       Current medications:    Current Facility-Administered Medications   Medication Dose Route Frequency Provider Last Rate Last Admin    lidocaine PF 1 % injection 1 mL  1 mL IntraDERmal Once PRN [place] Brein, APRN - CRNA        famotidine (PEPCID) tablet 20 mg  20 mg Oral Once Herlinda Brein, APRN - CRNA        0.9 % sodium chloride infusion   IntraVENous Continuous Herlinda Brein, APRN - CRNA           Allergies:  No Known Allergies    Problem List:    Patient Active Problem List   Diagnosis Code    Environmental allergies Z91.09    MICHAEL on CPAP G47.33, Z99.89    Chronic pain syndrome G89.4    Osteoarthritis of both knees M17.0    Hypotension I95.9    Coronary artery disease involving native coronary artery of native heart without angina pectoris I25.10    Acute pulmonary edema (HCC) J81.0    Coronary artery disease involving native coronary artery with unstable angina pectoris (HCC) I25.110    Pulmonary edema cardiac cause ([facility]) I50.1    Hyperkalemia E87.5    Chronic obstructive pulmonary disease (HCC) J44.9    Bilateral shoulder pain M25.511, M25.512    Respiratory failure requiring intubation ([facility]) J96.90    Flash pulmonary edema (HCC) J81.0    Elevated d-dimer R79.89    Syncope R55    CAD (coronary artery disease) I25.10    Compliance with medication regimen Z91.89    Chronic pain of both knees M25.561, M25.562, G89.29    Ischemic cardiomyopathy I25.5    Type 2 diabetes mellitus with hyperglycemia ([address] E11.65    Dyslipidemia, goal LDL below 70 E78.5    Iron deficiency anemia D50.9    SOB (shortness of breath) R06.02    Chronic systolic congestive heart failure ([address] I50.22    Former smoker Z87.891    Nonrheumatic mitral valve regurgitation I34.0    Caregiver stress Z63.6    Cardiomyopathy, dilated ([facility]) I42.0    Sepsis ([address] A41.9    Pulmonary edema J81.1    Severe obesity (BMI 35.0-39. 9) with comorbidity ([address] E66.01    COPD with acute exacerbation ([address] J44.1    Primary osteoarthritis of both knees M17.0    Elevated alkaline phosphatase level R74.8    Decreased GFR R94.4    Essential hypertension I10    STEMI (ST elevation myocardial infarction) ([facility]) I21.3    Acute hyperkalemia E87.5    Breast cancer ([facility]) C50.919    Rectal cancer ([facility]) C20    History of myocardial infarction I25.2    Stage 1 acute kidney injury ([facility]) N17.9    Pulmonary hypertension ([facility]) I27.20    Acute exacerbation of CHF (congestive heart failure) ([facility]) I50.9    Acute respiratory failure with hypoxia ([facility]) J96.01    Pneumonia due to COVID-19 virus U07.1, J12.82    Acute on chronic diastolic heart failure ([facility]) I50.33    Stage 3b chronic kidney disease ([facility]) N18.32    CHF (congestive heart failure) ([facility]) I50.9    CVA (cerebral vascular accident) ([facility] I63.9    ESRD (end stage renal disease) on dialysis ([facility]) N18.6, Z99.2    TIA (transient ischemic attack) G45.9    Dizziness R42    Stenosis of both vertebral arteries I65.03    Type 2 diabetes mellitus with hyperglycemia, with long-term current use of insulin ([facility]) E11.65, Z79.4    Sinus tachycardia R00.0    Chronic heart failure with preserved ejection fraction ([facility]) I50.32    Anemia due to chronic kidney disease, on chronic dialysis ([facility]) N18.6, D63.1, Z99.2       Past Medical History:        Diagnosis Date    Abnormal WBC count 05/17/2016    Anemia     Bilateral shoulder pain 09/27/2016    Chondromalacia of both patellae     Chronic heart failure with preserved ejection fraction ([facility] 01/13/2023    Chronic kidney disease     COPD (chronic obstructive pulmonary disease) ([facility] 09/2015    mild-mod dz; Dr Adrian Nettles    Diabetes [facility]) 2013    Diabetic eye exam [facility]) 2016    Dialysis patient [facility])     T/Th/Sat at [address] [place] [address] Ave [phone]    Dilated cardiomyopathy ([facility]     Dyslipidemia     Gout     Heart attack ([facility] 10/18/2019    Stented    History of echocardiogram 11/14/2014    Mild LVE. EF 40%. Mild, diffuse hypk. Mild LAE. Mild MR.      Hypercholesteremia 01/08/2014    Hypertension 2000    Noncompliance with medications 02/16/2016    Obesity     Orthostatic hypotension 05/17/2016    Osteoarthritis of both knees     Pain management 04/01/2016    Dr. Cate Pacheco Popliteal cyst     Sleep apnea     not using cpap    Stage 3b chronic kidney disease ([facility] 08/08/2022    Vitamin D deficiency 10/16/2014       Past Surgical History:        Procedure Laterality Date    CARDIAC CATHETERIZATION  2019    Coronary stent    COLOSTOMY  01/18/2023    IR TUNNELED CATHETER PLACEMENT GREATER THAN 5 YEARS  9/21/2022    IR TUNNELED CATHETER PLACEMENT GREATER THAN 5 YEARS 9/21/2022 [facility] IR    IR TUNNELED CATHETER PLACEMENT GREATER THAN 5 YEARS  09/21/2022    OTHER SURGICAL HISTORY  01/18/2023    Exploratory laparotomy, sigmoid colectomy, and descending colostomy    TUBAL LIGATION         Social History:    Social History     Tobacco Use    Smoking status: Former     Packs/day: 1.00     Types: Cigarettes     Quit date: 5/20/2019     Years since quitting: 3.8    Smokeless tobacco: Never   Substance Use Topics    Alcohol use: No     Alcohol/week: 0.0 standard drinks                                Counseling given: Not Answered      Vital Signs (Current): There were no vitals filed for this visit.                                            BP Readings from Last 3 Encounters:   03/05/23 (!) 161/97   03/01/23 138/72   02/15/23 (!) 156/98       NPO Status:                                                                                 BMI:   Wt Readings from Last 3 Encounters:   03/01/23 203 lb (92.1 kg)   02/13/23 207 lb 0.2 oz (93.9 kg)   02/08/23 195 lb 15.8 oz (88.9 kg)     There is no height or weight on file to calculate BMI.    CBC:   Lab Results   Component Value Date/Time    WBC 11.5 03/04/2023 01:15 PM    RBC 2.97 03/04/2023 01:15 PM    HGB 8.5 03/04/2023 01:15 PM    HCT 28.4 03/04/2023 01:15 PM    MCV 95.6 03/04/2023 01:15 PM    RDW 18.1 03/04/2023 01:15 PM     03/04/2023 01:15 PM       CMP:   Lab Results   Component Value Date/Time     03/04/2023 01:15 PM    K 4.5 03/04/2023 01:15 PM     03/04/2023 01:15 PM    CO2 23 03/04/2023 01:15 PM    BUN 19 03/04/2023 01:15 PM    CREATININE 1.88 03/04/2023 01:15 PM    GFRAA 54 09/22/2022 04:20 AM    AGRATIO 1.0 01/18/2023 12:11 PM    LABGLOM 28 03/04/2023 01:15 PM    GLUCOSE 160 03/04/2023 01:15 PM    PROT 6.8 01/18/2023 12:11 PM    CALCIUM 8.8 03/04/2023 01:15 PM    BILITOT 0.7 01/18/2023 12:11 PM    ALKPHOS 120 01/18/2023 12:11 PM    AST 11 01/18/2023 12:11 PM    ALT 55 01/18/2023 12:11 PM       POC Tests: No results for input(s): POCGLU, POCNA, POCK, POCCL, POCBUN, POCHEMO, POCHCT in the last 72 hours.     Coags:   Lab Results   Component Value Date/Time    PROTIME 14.7 01/18/2023 12:11 PM    INR 1.1 01/18/2023 12:11 PM    APTT 35.8 03/19/2021 04:00 AM       HCG (If Applicable): No results found for: PREGTESTUR, PREGSERUM, HCG, HCGQUANT     ABGs:   Lab Results   Component Value Date/Time    PHART 7.47 09/20/2022 02:53 PM    PO2ART 63 09/20/2022 02:53 PM    ZYH8IWB 36.6 09/20/2022 02:53 PM    IZD5KRR 26.3 09/20/2022 02:53 PM    BEART 2.4 09/20/2022 02:53 PM        Type & Screen (If Applicable):  No results found for: LABABO, LABRH    Drug/Infectious Status (If Applicable):  No results found for: HIV, HEPCAB    COVID-19 Screening (If Applicable):   Lab Results   Component Value Date/Time    COVID19 Not detected 03/04/2023 04:05 PM           Anesthesia Evaluation  Patient summary reviewed and Nursing notes reviewed  Airway: Mallampati: I          Dental: normal exam         Pulmonary: breath sounds clear to auscultation  (+) pneumonia:  COPD:  shortness of breath:  sleep apnea:                             Cardiovascular:  Exercise tolerance: good (>4 METS),   (+) hypertension:, angina:, past MI:, CAD:, CHF:,         Rhythm: regular  Rate: normal                    Neuro/Psych:   (+) CVA:, TIA,             GI/Hepatic/Renal: Neg GI/Hepatic/Renal ROS            Endo/Other:    (+) Diabetes, . Abdominal:             Vascular: Other Findings:           Anesthesia Plan      MAC     ASA 4             Anesthetic plan and risks discussed with patient.       Plan discussed with CRNA and surgical team.                    Quiana Biggs MD   3/10/2023

## 2023-03-13 ENCOUNTER — TELEPHONE (OUTPATIENT)
Age: 73
End: 2023-03-13

## 2023-03-13 ENCOUNTER — HOME CARE VISIT (OUTPATIENT)
Dept: SCHEDULING | Facility: HOME HEALTH | Age: 73
End: 2023-03-13
Payer: MEDICARE

## 2023-03-13 PROCEDURE — G0299 HHS/HOSPICE OF RN EA 15 MIN: HCPCS

## 2023-03-13 NOTE — TELEPHONE ENCOUNTER
Patient called and wanted to know about the bandage change from the procedure form 03/10/2023 with Dr. Serena Mtz. She stated that home health was at her house today and changed the bandage. Gave patient her follow up with study on 04/05/23 at 1245pm and see Dr. Serena Mtz at 215pm. Patient confirmed appointments.

## 2023-03-15 VITALS
OXYGEN SATURATION: 100 % | RESPIRATION RATE: 20 BRPM | SYSTOLIC BLOOD PRESSURE: 148 MMHG | DIASTOLIC BLOOD PRESSURE: 80 MMHG | HEART RATE: 78 BPM | TEMPERATURE: 97.7 F

## 2023-03-15 NOTE — HOME HEALTH
Skilled reason for visit: ostomy care, patient had AV graft to FRANCESCO on 3/10/23. Surgical incisions noted to mid left arm and left axilla. Areas CDI    Caregiver involvement:  assist with otomy care and transportation. Medications reviewed and all medications are available in the home this visit. The following education was provided regarding medications:  SN instructed in possible adverse reactions to Lasix, including dehydration, orthostatic hypotension, loss of potassium and other electrolytes, weakness, and fatigue. Lasix is used to reduce extra fluid in the body (edema) caused by conditions such as heart failure, liver disease, and kidney disease. This can lessen symptoms such as shortness of breath and swelling in your arms, legs, and abdomen. This drug is also used to treat high blood pressure. Lowering high blood pressure helps prevent strokes, heart attacks, and kidney problems. Lasix is a water pill (diuretic) that causes you to make more urine. This helps your body get rid of extra water and salt. MD notified of any discrepancies/look a-like medications/medication interactions: none  Medications are effective at this time.       Home health supplies by type and quantity ordered/delivered this visit include: n/a    Patient education provided this visit:     Sharps education provided: Clinician instructed patient/CG on proper disposal of sharps: Containers should be made of hard plastic, be puncture-resistant and leakproof,   such as a laundry detergent or bleach bottle.  When the container is ¾ full, it should be sealed with tape and labeled   DO NOT RECYCLE prior to discarding in the regular trash.        Patient level of understanding of education provided: patient verbalized understanding    Skilled Care Performed this visit: ostomy care, skin assessment, disease and medication management    Patient response to procedure performed:  ostomy care, disease and medication management    Agency Progress toward goals: progressing    Patient's Progress towards personal goals: progressing    Home exercise program:  Patient to continue with healthy heart/diabetic diet, take all medications as prescribed, implement fall and infection control precautions, report MD immediately if any changes in status or vital signs.     Continued need for the following skills: Nursing    Plan for next visit: ostomy care, disease and medication management    Patient and/or caregiver notified and agrees to changes in the Plan of Care YES/NO/NA: YES      The following discharge planning was discussed with the pt/caregiver: Discharge when goals are met, patient/caregiver able to manage disease process, medications, and pain

## 2023-03-16 ENCOUNTER — HOME CARE VISIT (OUTPATIENT)
Dept: SCHEDULING | Facility: HOME HEALTH | Age: 73
End: 2023-03-16
Payer: MEDICARE

## 2023-03-16 VITALS
DIASTOLIC BLOOD PRESSURE: 90 MMHG | RESPIRATION RATE: 18 BRPM | HEART RATE: 96 BPM | OXYGEN SATURATION: 99 % | TEMPERATURE: 97.3 F | SYSTOLIC BLOOD PRESSURE: 140 MMHG

## 2023-03-16 PROCEDURE — G0299 HHS/HOSPICE OF RN EA 15 MIN: HCPCS

## 2023-03-16 NOTE — HOME HEALTH
Skilled reason for visit: colostomy care & teaching, Patient dependent on  to apply ostomy bag, but patient demostrated how to clean ostomy site, cut wafer and apply ostomy bag correctly. Educated on how to empty bag and expel gas. Patient stated she is scheduled for a colonscopy on 4/10/23 with possible colostomy reversal.      Caregiver involvement:  assist with ostomy care and transportation. Medications reconciled  and all medications are available in the home this visit. MD notified of any discrepancies/look a-like medications/medication interactions: none  Medications are effective at this time. Home health supplies by type and quantity ordered/delivered this visit include: ostomy bags    Patient education provided this visit: Instructed patient caregiver If you keep having pain and cramping with no output from your stoma for more than 2 hours, and you cant reach your doctor or ostomy nurse, go to the emergency room. Take all your ostomy supplies with you. Sharps education provided: Clinician instructed patient/CG on proper disposal of sharps: Containers should be made of hard plastic, be puncture-resistant and leakproof,   such as a laundry detergent or bleach bottle.  When the container is ¾ full, it should be sealed with tape and labeled   DO NOT RECYCLE prior to discarding in the regular trash.        Patient level of understanding of education provided: patient verbalized understanding    Agency Progress toward goals: goals met    Patient's Progress towards personal goals: goals met    Home exercise program:  Patient to continue with healthy heart/diabetic diet, take all medications as prescribed, implement fall and infection control precautions, report MD immediately if any changes in status or vital signs.

## 2023-03-23 ENCOUNTER — CLINICAL DOCUMENTATION (OUTPATIENT)
Age: 73
End: 2023-03-23

## 2023-03-23 NOTE — PROGRESS NOTES
Received message from Dr. Dorie Posey from Dr. Eufemia Uriarte about patient not anymore on dialysis and wants her catheter removed. Called Luanne Fuentes to get note that the catheter can be removed. Spoke to the charge nurse and will fax the note.

## 2023-03-31 ENCOUNTER — HOSPITAL ENCOUNTER (OUTPATIENT)
Facility: HOSPITAL | Age: 73
Discharge: HOME | End: 2023-04-03

## 2023-03-31 LAB — LABCORP SPECIMEN COLLECTION: NORMAL

## 2023-03-31 PROCEDURE — 99001 SPECIMEN HANDLING PT-LAB: CPT

## 2023-04-05 ENCOUNTER — OFFICE VISIT (OUTPATIENT)
Age: 73
End: 2023-04-05
Payer: MEDICARE

## 2023-04-05 VITALS — BODY MASS INDEX: 35 KG/M2 | HEIGHT: 64 IN | WEIGHT: 205.03 LBS

## 2023-04-05 DIAGNOSIS — N18.6 ESRD (END STAGE RENAL DISEASE) (HCC): Primary | ICD-10-CM

## 2023-04-05 PROCEDURE — 1123F ACP DISCUSS/DSCN MKR DOCD: CPT | Performed by: PHYSICIAN ASSISTANT

## 2023-04-05 PROCEDURE — 99214 OFFICE O/P EST MOD 30 MIN: CPT | Performed by: PHYSICIAN ASSISTANT

## 2023-04-05 ASSESSMENT — PATIENT HEALTH QUESTIONNAIRE - PHQ9
2. FEELING DOWN, DEPRESSED OR HOPELESS: 0
SUM OF ALL RESPONSES TO PHQ9 QUESTIONS 1 & 2: 0
SUM OF ALL RESPONSES TO PHQ QUESTIONS 1-9: 0
1. LITTLE INTEREST OR PLEASURE IN DOING THINGS: 0
SUM OF ALL RESPONSES TO PHQ QUESTIONS 1-9: 0

## 2023-04-05 NOTE — PROGRESS NOTES
Post Removal of Tunneled Dialysis Catheter Note    4/5/2023     Procedure(s): Tunneled dialysis catheter in the right IJ removed without complications. Sutures necessary: No: Adequate hemostasis obtained with 7 minutes of direct pressure    Findings: Anchoring sutures removed. Cleansed site with betadine. Local anesthesia with 5 mL 1% lidocaine. Blunt dissection of cuff. Catheter removed in its entirety. Tip was displayed to nursing and its entirety including the tip and was not  sent for culture. Pressure dressing applied. Complications: None. Patient tolerated the procedure well, adequate hemostasis obtained. Plan: Per Nephrology    Follow Up: As needed.   Patient to follow-up with nephrologist as scheduled    Signed By: Edy Sadler PA-C  4/5/2023  2:02 PM

## 2023-04-05 NOTE — PROGRESS NOTES
1. Have you been to the ER, urgent care clinic since your last visit? No      Hospitalized since your last visit? No     2. Have you seen or consulted any other health care providers outside of the [address] [place] [address] since your last visit? Include any pap smears or colon screening.   No

## 2023-04-10 ENCOUNTER — TELEPHONE (OUTPATIENT)
Age: 73
End: 2023-04-10

## 2023-04-26 ENCOUNTER — OFFICE VISIT (OUTPATIENT)
Age: 73
End: 2023-04-26
Payer: MEDICARE

## 2023-04-26 VITALS — BODY MASS INDEX: 34.83 KG/M2 | TEMPERATURE: 98.6 F | WEIGHT: 204 LBS | HEIGHT: 64 IN

## 2023-04-26 DIAGNOSIS — M17.12 ARTHRITIS OF LEFT KNEE: ICD-10-CM

## 2023-04-26 DIAGNOSIS — M25.562 CHRONIC PAIN OF LEFT KNEE: Primary | ICD-10-CM

## 2023-04-26 DIAGNOSIS — M25.462 EFFUSION OF LEFT KNEE: ICD-10-CM

## 2023-04-26 DIAGNOSIS — M25.662 DECREASED RANGE OF MOTION (ROM) OF LEFT KNEE: ICD-10-CM

## 2023-04-26 DIAGNOSIS — G89.29 CHRONIC PAIN OF LEFT KNEE: Primary | ICD-10-CM

## 2023-04-26 PROCEDURE — 20611 DRAIN/INJ JOINT/BURSA W/US: CPT | Performed by: PHYSICIAN ASSISTANT

## 2023-04-26 PROCEDURE — 99213 OFFICE O/P EST LOW 20 MIN: CPT | Performed by: PHYSICIAN ASSISTANT

## 2023-04-26 PROCEDURE — 1123F ACP DISCUSS/DSCN MKR DOCD: CPT | Performed by: PHYSICIAN ASSISTANT

## 2023-04-26 RX ORDER — TRIAMCINOLONE ACETONIDE 40 MG/ML
40 INJECTION, SUSPENSION INTRA-ARTICULAR; INTRAMUSCULAR ONCE
Status: COMPLETED | OUTPATIENT
Start: 2023-04-26 | End: 2023-04-26

## 2023-04-26 RX ADMIN — TRIAMCINOLONE ACETONIDE 40 MG: 40 INJECTION, SUSPENSION INTRA-ARTICULAR; INTRAMUSCULAR at 15:10

## 2023-04-26 NOTE — PROGRESS NOTES
maintenance. I have asked the patient to schedule an appointment with their primary care provider for follow-up on general health maintenance concerns. Today all the patient's questions were answered to their satisfaction. Copies of x-rays reviewed if obtained this visit, and provided to patient. Dictation disclaimer:  Please note that this dictation was completed with \"Dragon\", the computer voice recognition software. Today's exam was dictated using fluency dictation software (voice recognition software). Occasionally, sound-a-like computer induced   dictation errors will populate the report. Quite often unanticipated grammatical, syntax, homophones, and other interpretive errors are inadvertently transcribed by the computer software. Please disregard these errors. Please excuse any errors that have escaped final proofreading. Maurice CINTRON, APC, MPAS, PA-C  [facility]

## 2023-05-04 ENCOUNTER — OFFICE VISIT (OUTPATIENT)
Age: 73
End: 2023-05-04
Payer: MEDICARE

## 2023-05-04 VITALS — TEMPERATURE: 97.3 F | HEIGHT: 64 IN | WEIGHT: 202 LBS | BODY MASS INDEX: 34.49 KG/M2

## 2023-05-04 DIAGNOSIS — M17.12 PRIMARY OSTEOARTHRITIS OF LEFT KNEE: ICD-10-CM

## 2023-05-04 DIAGNOSIS — M25.561 CHRONIC PAIN OF RIGHT KNEE: ICD-10-CM

## 2023-05-04 DIAGNOSIS — M17.11 PRIMARY OSTEOARTHRITIS OF RIGHT KNEE: Primary | ICD-10-CM

## 2023-05-04 DIAGNOSIS — G89.29 CHRONIC PAIN OF RIGHT KNEE: ICD-10-CM

## 2023-05-04 PROCEDURE — 20611 DRAIN/INJ JOINT/BURSA W/US: CPT | Performed by: PHYSICIAN ASSISTANT

## 2023-05-04 PROCEDURE — 1123F ACP DISCUSS/DSCN MKR DOCD: CPT | Performed by: PHYSICIAN ASSISTANT

## 2023-05-04 PROCEDURE — 99213 OFFICE O/P EST LOW 20 MIN: CPT | Performed by: PHYSICIAN ASSISTANT

## 2023-05-04 RX ORDER — TRIAMCINOLONE ACETONIDE 40 MG/ML
40 INJECTION, SUSPENSION INTRA-ARTICULAR; INTRAMUSCULAR ONCE
Status: COMPLETED | OUTPATIENT
Start: 2023-05-04 | End: 2023-05-04

## 2023-05-04 RX ADMIN — TRIAMCINOLONE ACETONIDE 40 MG: 40 INJECTION, SUSPENSION INTRA-ARTICULAR; INTRAMUSCULAR at 13:39

## 2023-05-04 NOTE — PROGRESS NOTES
knee.    Consideration for using hyaluronic acid patient's current treatment with cortisone following aspiration failed to be expectations. In addition patient was discussed again the importance of weight management noting the decreasing axial loading forces of the weight will slow the progression of arthritis below the waist up. Today all of her questions answered to her satisfaction. Procedural: Using sterile technique and verbal and written consent were obtained appropriate timeout formed patient laying supine right knee flexed to degrees on a bolster 5 cc of Xylocaine 1% used anesthetize a superior lateral interarticular approach. To follow 8 cc of straw-colored blood tinted aspirate removed from the same portal.  Fluid was discarded. To follow 1 cc of Kenalog 40 mg per mill mixed with 7 mils of 1% Xylocaine injected. Patient tolerated the procedure well. Chart reviewed for the following:   Viktor Middleton PA-C, have reviewed the History, Physical and updated the Allergic reactions for [address] performed immediately prior to start of procedure:   I, Wendy Middleton PA-C, have performed the following reviews on Jorge Pratt prior to the start of the procedure:            * Patient was identified by name and date of birth   * Agreement on procedure being performed was verified  * Risks and Benefits explained to the patient  * Procedure site verified and marked as necessary  * Patient was positioned for comfort  * Consent was signed and verified             Date of procedure: 05/04/23    Time: 10:30 AM    Procedure performed by:  Pearla Mcburney, PA-C    Provider assisted by: None     Patient assisted by: self    How tolerated by patient: tolerated the procedure well with no complications    Comments: none    The patient is asked to continue to rest the area for a few more days before resuming regular activities. It may be more painful for the first 1-2 days.   Watch for fever, or

## 2023-05-16 DIAGNOSIS — M17.11 PRIMARY OSTEOARTHRITIS OF RIGHT KNEE: Primary | ICD-10-CM

## 2023-05-16 DIAGNOSIS — M17.12 PRIMARY OSTEOARTHRITIS OF LEFT KNEE: ICD-10-CM

## 2023-06-02 RX ORDER — BUMETANIDE 2 MG/1
TABLET ORAL
COMMUNITY
Start: 2023-04-20

## 2023-06-02 RX ORDER — MENTHOL/CAMPHR/ANTIARTHRITIC 1 11 %-11 %
CREAM (ML) TOPICAL
COMMUNITY
Start: 2023-03-31

## 2023-06-15 ENCOUNTER — HOSPITAL ENCOUNTER (OUTPATIENT)
Facility: HOSPITAL | Age: 73
Setting detail: OUTPATIENT SURGERY
Discharge: HOME | End: 2023-06-15
Attending: INTERNAL MEDICINE | Admitting: INTERNAL MEDICINE
Payer: MEDICARE

## 2023-06-15 VITALS
RESPIRATION RATE: 18 BRPM | HEART RATE: 88 BPM | OXYGEN SATURATION: 97 % | SYSTOLIC BLOOD PRESSURE: 132 MMHG | BODY MASS INDEX: 34.74 KG/M2 | WEIGHT: 203.5 LBS | DIASTOLIC BLOOD PRESSURE: 72 MMHG | HEIGHT: 64 IN | TEMPERATURE: 98.4 F

## 2023-06-15 LAB
GLUCOSE BLD STRIP.AUTO-MCNC: 113 MG/DL (ref 70–110)
GLUCOSE BLD STRIP.AUTO-MCNC: 123 MG/DL (ref 70–110)

## 2023-06-15 PROCEDURE — 3700000000 HC ANESTHESIA ATTENDED CARE: Performed by: INTERNAL MEDICINE

## 2023-06-15 PROCEDURE — 2709999900 HC NON-CHARGEABLE SUPPLY: Performed by: INTERNAL MEDICINE

## 2023-06-15 PROCEDURE — 3600007502: Performed by: INTERNAL MEDICINE

## 2023-06-15 PROCEDURE — 82962 GLUCOSE BLOOD TEST: CPT

## 2023-06-15 PROCEDURE — 7100000000 HC PACU RECOVERY - FIRST 15 MIN: Performed by: INTERNAL MEDICINE

## 2023-06-15 PROCEDURE — 3600007512: Performed by: INTERNAL MEDICINE

## 2023-06-15 PROCEDURE — 7100000010 HC PHASE II RECOVERY - FIRST 15 MIN: Performed by: INTERNAL MEDICINE

## 2023-06-15 PROCEDURE — 3700000001 HC ADD 15 MINUTES (ANESTHESIA): Performed by: INTERNAL MEDICINE

## 2023-06-15 PROCEDURE — 2580000003 HC RX 258: Performed by: NURSE ANESTHETIST, CERTIFIED REGISTERED

## 2023-06-15 RX ORDER — SODIUM CHLORIDE 9 MG/ML
INJECTION, SOLUTION INTRAVENOUS CONTINUOUS
Status: DISCONTINUED | OUTPATIENT
Start: 2023-06-15 | End: 2023-06-15 | Stop reason: HOSPADM

## 2023-06-15 RX ORDER — LIDOCAINE HYDROCHLORIDE 10 MG/ML
1 INJECTION, SOLUTION EPIDURAL; INFILTRATION; INTRACAUDAL; PERINEURAL
Status: DISCONTINUED | OUTPATIENT
Start: 2023-06-15 | End: 2023-06-15 | Stop reason: HOSPADM

## 2023-06-15 RX ORDER — INSULIN LISPRO 100 [IU]/ML
1-15 INJECTION, SOLUTION INTRAVENOUS; SUBCUTANEOUS ONCE
Status: DISCONTINUED | OUTPATIENT
Start: 2023-06-15 | End: 2023-06-15 | Stop reason: HOSPADM

## 2023-06-15 RX ORDER — FAMOTIDINE 20 MG/1
20 TABLET, FILM COATED ORAL ONCE
Status: DISCONTINUED | OUTPATIENT
Start: 2023-06-15 | End: 2023-06-15

## 2023-06-15 RX ORDER — DEXTROSE MONOHYDRATE 100 MG/ML
INJECTION, SOLUTION INTRAVENOUS CONTINUOUS PRN
Status: DISCONTINUED | OUTPATIENT
Start: 2023-06-15 | End: 2023-06-15 | Stop reason: HOSPADM

## 2023-06-15 RX ADMIN — SODIUM CHLORIDE: 900 INJECTION, SOLUTION INTRAVENOUS at 14:50

## 2023-06-15 ASSESSMENT — PAIN SCALES - GENERAL
PAINLEVEL_OUTOF10: 0

## 2023-06-15 ASSESSMENT — PAIN - FUNCTIONAL ASSESSMENT: PAIN_FUNCTIONAL_ASSESSMENT: 0-10

## 2023-06-15 NOTE — DISCHARGE INSTRUCTIONS
Colonoscopy: What to Expect at [address] [place] [address]  After a colonoscopy, you'll stay at the clinic until you wake up. Then you can go home. But you'll need to arrange for a ride. Your doctor will tell you when you can eat and do your other usual activities. Your doctor will talk to you about when you'll need your next colonoscopy. Your doctor can help you decide how often you need to be checked. This will depend on the results of your test and your risk for colorectal cancer. After the test, you may be bloated or have gas pains. You may need to pass gas. If a biopsy was done or a polyp was removed, you may have streaks of blood in your stool (feces) for a few days. Problems such as heavy rectal bleeding may not occur until several weeks after the test. This isn't common. But it can happen after polyps are removed. This care sheet gives you a general idea about how long it will take for you to recover. But each person recovers at a different pace. Follow the steps below to get better as quickly as possible. How can you care for yourself at home? Activity    Rest when you feel tired. You can do your normal activities when it feels okay to do so. Diet    Follow your doctor's directions for eating. Unless your doctor has told you not to, drink plenty of fluids. This helps to replace the fluids that were lost during the colon prep. Do not drink alcohol. Medicines    Your doctor will tell you if and when you can restart your medicines. You will also be given instructions about taking any new medicines. If you take aspirin or some other blood thinner, ask your doctor if and when to start taking it again. Make sure that you understand exactly what your doctor wants you to do. If polyps were removed or a biopsy was done during the test, your doctor may tell you not to take aspirin or other anti-inflammatory medicines for a few days. These include ibuprofen (Advil, Motrin) and naproxen (Aleve).

## 2023-06-20 NOTE — ED NOTES
Attempted to call pt report to 2-S . Manny Genao Was told by Nash Lunsford that there is no RN assigned to this pt yet. . They will call back. Manny Genao
Attempted to call report to 2-s again for room # 205. Serna Challenger Per FLORENCIA Thompson: she spoke with the house super and it is ok to not take the pt at this time. .. The pt's room may be reassigned.
Attempted to give pt report to accepting RULA Taveras She is to busy at this time to rec. Report. ..
I called and spoke with clifton Basilio at [facility]. .. I am requested to wait another 15 min then call back to give pt report. Quiana Padilla
Meal with po fluids are given to pt . .. tv dinner/apple sauce. ..
Pt placed back on oxygen after VBG states the o2 was helping her a lot and feels like she is breathing harder without it; pt appears in nad but appears winded easily
Pt report to [facility]. .. All questions answered. ..
Pt transferred to room 1 from room A for comfort due to needing bsc and will need larger room to accomodate movement etc.
Pt up to bedside commode, tolerated with dyspnea but in nad; understands urine will need to be collected and measured
Still waiting for a room assignment. .  Per Damien Telles: Pt will have a room after 1900 hrs.
o-2 is removed for a room air trial... Pt is up to bsc. Jigna Zaldivar
rec'd pt report from [facility]. ..
Attempted and Successful

## 2023-06-22 ENCOUNTER — TELEPHONE (OUTPATIENT)
Age: 73
End: 2023-06-22

## 2023-06-22 ENCOUNTER — OFFICE VISIT (OUTPATIENT)
Age: 73
End: 2023-06-22

## 2023-06-22 VITALS
DIASTOLIC BLOOD PRESSURE: 69 MMHG | WEIGHT: 206 LBS | SYSTOLIC BLOOD PRESSURE: 141 MMHG | HEIGHT: 64 IN | BODY MASS INDEX: 35.17 KG/M2 | TEMPERATURE: 97.3 F | OXYGEN SATURATION: 95 % | HEART RATE: 85 BPM

## 2023-06-22 DIAGNOSIS — I50.32 CHRONIC HEART FAILURE WITH PRESERVED EJECTION FRACTION (HCC): ICD-10-CM

## 2023-06-22 DIAGNOSIS — J44.1 COPD WITH ACUTE EXACERBATION (HCC): ICD-10-CM

## 2023-06-22 DIAGNOSIS — N18.6 ESRD (END STAGE RENAL DISEASE) ON DIALYSIS (HCC): ICD-10-CM

## 2023-06-22 DIAGNOSIS — Z99.2 ESRD (END STAGE RENAL DISEASE) ON DIALYSIS (HCC): ICD-10-CM

## 2023-06-22 DIAGNOSIS — Z93.3 COLOSTOMY STATUS (HCC): Primary | ICD-10-CM

## 2023-06-22 NOTE — TELEPHONE ENCOUNTER
Spoke to Ms. Donahue to schedule colostomy takedown with Dr. Kylah Morales on Friday, June 30, 2023 at [facility]. Patient informed of need to complete bowel prep for surgery.

## 2023-06-22 NOTE — PROGRESS NOTES
Jesús Villar is a 81 y.o. female (: 1950) presenting to address:    Chief Complaint   Patient presents with    Follow-up     2 month follow up for colostomy status/Discuss take down       Medication list and allergies have been reviewed with Jesús Villar and updated as of today's date. I have gone over all Medical, Surgical and Social History with Jesús Villar and updated/added the information accordingly. 1. Have you been to the ER, Urgent Care or Hospitalized since your last visit? No          2. Have you followed up with your PCP or any other Physicians since your procedure/ last office visit?    No
Illness. Objective:     BP (!) 141/69 (Site: Right Upper Arm, Position: Sitting, Cuff Size: Large Adult)   Pulse 85   Temp 97.3 °F (36.3 °C)   Ht 5' 4\" (1.626 m)   Wt 206 lb (93.4 kg)   SpO2 95%   BMI 35.36 kg/m²     Physical Exam:      General:  in no apparent distress, alert, oriented times 3, and afebrile   Eyes:  conjunctivae and sclerae normal, pupils equal, round, reactive to light   Throat & Neck: normal, no erythema or exudates noted. , and no palpable masses   Lungs:   clear to auscultation bilaterally   Heart:  Regular rate and rhythm   Abdomen:   rounded, soft, nontender, nondistended, no masses or organomegaly colostomy in place. .    Extremities: extremities normal, atraumatic, no cyanosis or edema   Skin: Normal.       Imaging and Lab Review:     CBC:   Lab Results   Component Value Date/Time    WBC 11.5 03/04/2023 01:15 PM    RBC 2.97 03/04/2023 01:15 PM    HGB 8.5 03/04/2023 01:15 PM    HCT 28.4 03/04/2023 01:15 PM     03/04/2023 01:15 PM     BMP:   Lab Results   Component Value Date/Time     03/10/2023 06:54 AM    K 4.9 03/10/2023 06:54 AM     03/10/2023 06:54 AM    CO2 24 03/10/2023 06:54 AM    BUN 27 03/10/2023 06:54 AM     CMP:  Lab Results   Component Value Date/Time     03/10/2023 06:54 AM    K 4.9 03/10/2023 06:54 AM     03/10/2023 06:54 AM    CO2 24 03/10/2023 06:54 AM    BUN 27 03/10/2023 06:54 AM    GLOB 3.4 01/18/2023 12:11 PM       No results found for this or any previous visit (from the past 24 hour(s)). images and reports reviewed    Assessment:   Yuliet Nieves is a 81 y.o. female who is here to discuss her colostomy takedown. The patient had acute diverticulitis with perforation 6 months ago and she ended up with a colostomy. He is doing relatively well and her colonoscopy did not show any major pathology except for mild diverticulosis.   I discussed with her  today the risks of the procedure including bleeding and infection as well as

## 2023-06-23 DIAGNOSIS — Z01.818 PREOP EXAMINATION: Primary | ICD-10-CM

## 2023-06-26 ENCOUNTER — TELEPHONE (OUTPATIENT)
Age: 73
End: 2023-06-26

## 2023-06-26 RX ORDER — POLYETHYLENE GLYCOL 3350, SODIUM CHLORIDE, POTASSIUM CHLORIDE, SODIUM BICARBONATE, AND SODIUM SULFATE 240; 5.84; 2.98; 6.72; 22.72 G/4L; G/4L; G/4L; G/4L; G/4L
4000 POWDER, FOR SOLUTION ORAL ONCE
Qty: 4000 ML | Refills: 0 | Status: SHIPPED | OUTPATIENT
Start: 2023-06-26 | End: 2023-06-26

## 2023-06-26 RX ORDER — ERGOCALCIFEROL 1.25 MG/1
1 CAPSULE ORAL
Status: ON HOLD | COMMUNITY
Start: 2023-04-20 | End: 2023-07-07 | Stop reason: HOSPADM

## 2023-06-29 ENCOUNTER — ANESTHESIA EVENT (OUTPATIENT)
Facility: HOSPITAL | Age: 73
End: 2023-06-29
Payer: MEDICARE

## 2023-06-29 RX ORDER — SODIUM CHLORIDE, SODIUM LACTATE, POTASSIUM CHLORIDE, CALCIUM CHLORIDE 600; 310; 30; 20 MG/100ML; MG/100ML; MG/100ML; MG/100ML
INJECTION, SOLUTION INTRAVENOUS CONTINUOUS
Status: DISCONTINUED | OUTPATIENT
Start: 2023-06-29 | End: 2023-06-29

## 2023-06-30 ENCOUNTER — HOSPITAL ENCOUNTER (INPATIENT)
Facility: HOSPITAL | Age: 73
LOS: 7 days | Discharge: HOME | DRG: 330 | End: 2023-07-07
Attending: SURGERY | Admitting: SURGERY
Payer: MEDICARE

## 2023-06-30 ENCOUNTER — ANESTHESIA (OUTPATIENT)
Facility: HOSPITAL | Age: 73
End: 2023-06-30
Payer: MEDICARE

## 2023-06-30 DIAGNOSIS — K57.20 PERFORATED DIVERTICULUM OF LARGE INTESTINE: Primary | ICD-10-CM

## 2023-06-30 PROBLEM — Z98.890 S/P EXPLORATORY LAPAROTOMY: Status: ACTIVE | Noted: 2023-06-30

## 2023-06-30 LAB
ANION GAP SERPL CALC-SCNC: 9 MMOL/L (ref 3–18)
BUN SERPL-MCNC: 44 MG/DL (ref 7–18)
BUN/CREAT SERPL: 21 (ref 12–20)
CALCIUM SERPL-MCNC: 9.1 MG/DL (ref 8.5–10.1)
CHLORIDE SERPL-SCNC: 107 MMOL/L (ref 100–111)
CO2 SERPL-SCNC: 26 MMOL/L (ref 21–32)
CREAT SERPL-MCNC: 2.11 MG/DL (ref 0.6–1.3)
GLUCOSE BLD STRIP.AUTO-MCNC: 133 MG/DL (ref 70–110)
GLUCOSE BLD STRIP.AUTO-MCNC: 160 MG/DL (ref 70–110)
GLUCOSE BLD STRIP.AUTO-MCNC: 188 MG/DL (ref 70–110)
GLUCOSE BLD STRIP.AUTO-MCNC: 213 MG/DL (ref 70–110)
GLUCOSE SERPL-MCNC: 129 MG/DL (ref 74–99)
POTASSIUM SERPL-SCNC: 4 MMOL/L (ref 3.5–5.5)
SODIUM SERPL-SCNC: 142 MMOL/L (ref 136–145)

## 2023-06-30 PROCEDURE — 2580000003 HC RX 258: Performed by: SURGERY

## 2023-06-30 PROCEDURE — A4216 STERILE WATER/SALINE, 10 ML: HCPCS | Performed by: NURSE ANESTHETIST, CERTIFIED REGISTERED

## 2023-06-30 PROCEDURE — 2580000003 HC RX 258: Performed by: NURSE ANESTHETIST, CERTIFIED REGISTERED

## 2023-06-30 PROCEDURE — 2500000003 HC RX 250 WO HCPCS: Performed by: NURSE ANESTHETIST, CERTIFIED REGISTERED

## 2023-06-30 PROCEDURE — 3700000001 HC ADD 15 MINUTES (ANESTHESIA): Performed by: SURGERY

## 2023-06-30 PROCEDURE — 3700000000 HC ANESTHESIA ATTENDED CARE: Performed by: SURGERY

## 2023-06-30 PROCEDURE — 7100000000 HC PACU RECOVERY - FIRST 15 MIN: Performed by: SURGERY

## 2023-06-30 PROCEDURE — 6360000002 HC RX W HCPCS: Performed by: SURGERY

## 2023-06-30 PROCEDURE — 1100000000 HC RM PRIVATE

## 2023-06-30 PROCEDURE — 6360000002 HC RX W HCPCS: Performed by: NURSE ANESTHETIST, CERTIFIED REGISTERED

## 2023-06-30 PROCEDURE — 2709999900 HC NON-CHARGEABLE SUPPLY: Performed by: SURGERY

## 2023-06-30 PROCEDURE — 3600000012 HC SURGERY LEVEL 2 ADDTL 15MIN: Performed by: SURGERY

## 2023-06-30 PROCEDURE — 2720000010 HC SURG SUPPLY STERILE: Performed by: SURGERY

## 2023-06-30 PROCEDURE — 88307 TISSUE EXAM BY PATHOLOGIST: CPT

## 2023-06-30 PROCEDURE — 88305 TISSUE EXAM BY PATHOLOGIST: CPT

## 2023-06-30 PROCEDURE — 6360000002 HC RX W HCPCS: Performed by: STUDENT IN AN ORGANIZED HEALTH CARE EDUCATION/TRAINING PROGRAM

## 2023-06-30 PROCEDURE — A4217 STERILE WATER/SALINE, 500 ML: HCPCS | Performed by: SURGERY

## 2023-06-30 PROCEDURE — 80048 BASIC METABOLIC PNL TOTAL CA: CPT

## 2023-06-30 PROCEDURE — 0DBN0ZZ EXCISION OF SIGMOID COLON, OPEN APPROACH: ICD-10-PCS | Performed by: SURGERY

## 2023-06-30 PROCEDURE — 0DNG0ZZ RELEASE LEFT LARGE INTESTINE, OPEN APPROACH: ICD-10-PCS | Performed by: SURGERY

## 2023-06-30 PROCEDURE — 7100000001 HC PACU RECOVERY - ADDTL 15 MIN: Performed by: SURGERY

## 2023-06-30 PROCEDURE — 6370000000 HC RX 637 (ALT 250 FOR IP): Performed by: SURGERY

## 2023-06-30 PROCEDURE — 82962 GLUCOSE BLOOD TEST: CPT

## 2023-06-30 PROCEDURE — 3600000002 HC SURGERY LEVEL 2 BASE: Performed by: SURGERY

## 2023-06-30 PROCEDURE — 0DBM0ZZ EXCISION OF DESCENDING COLON, OPEN APPROACH: ICD-10-PCS | Performed by: SURGERY

## 2023-06-30 RX ORDER — ONDANSETRON 2 MG/ML
INJECTION INTRAMUSCULAR; INTRAVENOUS PRN
Status: DISCONTINUED | OUTPATIENT
Start: 2023-06-30 | End: 2023-06-30 | Stop reason: SDUPTHER

## 2023-06-30 RX ORDER — ROCURONIUM BROMIDE 10 MG/ML
INJECTION, SOLUTION INTRAVENOUS PRN
Status: DISCONTINUED | OUTPATIENT
Start: 2023-06-30 | End: 2023-06-30 | Stop reason: SDUPTHER

## 2023-06-30 RX ORDER — GLYCOPYRROLATE 0.2 MG/ML
INJECTION INTRAMUSCULAR; INTRAVENOUS PRN
Status: DISCONTINUED | OUTPATIENT
Start: 2023-06-30 | End: 2023-06-30 | Stop reason: SDUPTHER

## 2023-06-30 RX ORDER — SODIUM CHLORIDE 0.9 % (FLUSH) 0.9 %
5-40 SYRINGE (ML) INJECTION PRN
Status: DISCONTINUED | OUTPATIENT
Start: 2023-06-30 | End: 2023-06-30 | Stop reason: HOSPADM

## 2023-06-30 RX ORDER — INSULIN LISPRO 100 [IU]/ML
0-4 INJECTION, SOLUTION INTRAVENOUS; SUBCUTANEOUS NIGHTLY
Status: DISCONTINUED | OUTPATIENT
Start: 2023-06-30 | End: 2023-07-07 | Stop reason: HOSPADM

## 2023-06-30 RX ORDER — HYDROMORPHONE HYDROCHLORIDE 2 MG/ML
1 INJECTION, SOLUTION INTRAMUSCULAR; INTRAVENOUS; SUBCUTANEOUS
Status: DISCONTINUED | OUTPATIENT
Start: 2023-06-30 | End: 2023-07-02

## 2023-06-30 RX ORDER — MAGNESIUM SULFATE 1 G/100ML
INJECTION INTRAVENOUS PRN
Status: DISCONTINUED | OUTPATIENT
Start: 2023-06-30 | End: 2023-06-30 | Stop reason: SDUPTHER

## 2023-06-30 RX ORDER — DEXTROSE MONOHYDRATE 100 MG/ML
INJECTION, SOLUTION INTRAVENOUS CONTINUOUS PRN
Status: DISCONTINUED | OUTPATIENT
Start: 2023-06-30 | End: 2023-06-30 | Stop reason: HOSPADM

## 2023-06-30 RX ORDER — NEOSTIGMINE METHYLSULFATE 1 MG/ML
INJECTION, SOLUTION INTRAVENOUS PRN
Status: DISCONTINUED | OUTPATIENT
Start: 2023-06-30 | End: 2023-06-30 | Stop reason: SDUPTHER

## 2023-06-30 RX ORDER — LIDOCAINE HYDROCHLORIDE 10 MG/ML
1 INJECTION, SOLUTION EPIDURAL; INFILTRATION; INTRACAUDAL; PERINEURAL
Status: DISCONTINUED | OUTPATIENT
Start: 2023-06-30 | End: 2023-06-30 | Stop reason: HOSPADM

## 2023-06-30 RX ORDER — FENTANYL CITRATE 50 UG/ML
INJECTION, SOLUTION INTRAMUSCULAR; INTRAVENOUS PRN
Status: DISCONTINUED | OUTPATIENT
Start: 2023-06-30 | End: 2023-06-30 | Stop reason: SDUPTHER

## 2023-06-30 RX ORDER — DEXAMETHASONE SODIUM PHOSPHATE 4 MG/ML
INJECTION, SOLUTION INTRA-ARTICULAR; INTRALESIONAL; INTRAMUSCULAR; INTRAVENOUS; SOFT TISSUE PRN
Status: DISCONTINUED | OUTPATIENT
Start: 2023-06-30 | End: 2023-06-30 | Stop reason: SDUPTHER

## 2023-06-30 RX ORDER — ALBUTEROL SULFATE 2.5 MG/3ML
2.5 SOLUTION RESPIRATORY (INHALATION) EVERY 6 HOURS PRN
Status: DISCONTINUED | OUTPATIENT
Start: 2023-06-30 | End: 2023-07-07 | Stop reason: HOSPADM

## 2023-06-30 RX ORDER — FENTANYL CITRATE 50 UG/ML
50 INJECTION, SOLUTION INTRAMUSCULAR; INTRAVENOUS EVERY 5 MIN PRN
Status: DISCONTINUED | OUTPATIENT
Start: 2023-06-30 | End: 2023-06-30 | Stop reason: HOSPADM

## 2023-06-30 RX ORDER — PROPOFOL 10 MG/ML
INJECTION, EMULSION INTRAVENOUS PRN
Status: DISCONTINUED | OUTPATIENT
Start: 2023-06-30 | End: 2023-06-30 | Stop reason: SDUPTHER

## 2023-06-30 RX ORDER — SODIUM CHLORIDE 9 MG/ML
INJECTION, SOLUTION INTRAVENOUS CONTINUOUS
Status: DISCONTINUED | OUTPATIENT
Start: 2023-06-30 | End: 2023-06-30 | Stop reason: HOSPADM

## 2023-06-30 RX ORDER — ONDANSETRON 2 MG/ML
4 INJECTION INTRAMUSCULAR; INTRAVENOUS EVERY 6 HOURS PRN
Status: DISCONTINUED | OUTPATIENT
Start: 2023-06-30 | End: 2023-07-07 | Stop reason: HOSPADM

## 2023-06-30 RX ORDER — SODIUM CHLORIDE 0.9 % (FLUSH) 0.9 %
5-40 SYRINGE (ML) INJECTION EVERY 12 HOURS SCHEDULED
Status: DISCONTINUED | OUTPATIENT
Start: 2023-06-30 | End: 2023-06-30 | Stop reason: HOSPADM

## 2023-06-30 RX ORDER — EPHEDRINE SULFATE/0.9% NACL/PF 50 MG/5 ML
SYRINGE (ML) INTRAVENOUS PRN
Status: DISCONTINUED | OUTPATIENT
Start: 2023-06-30 | End: 2023-06-30 | Stop reason: SDUPTHER

## 2023-06-30 RX ORDER — INSULIN LISPRO 100 [IU]/ML
1-15 INJECTION, SOLUTION INTRAVENOUS; SUBCUTANEOUS ONCE
Status: DISCONTINUED | OUTPATIENT
Start: 2023-06-30 | End: 2023-06-30 | Stop reason: HOSPADM

## 2023-06-30 RX ORDER — PHENYLEPHRINE HCL IN 0.9% NACL 1 MG/10 ML
SYRINGE (ML) INTRAVENOUS PRN
Status: DISCONTINUED | OUTPATIENT
Start: 2023-06-30 | End: 2023-06-30 | Stop reason: SDUPTHER

## 2023-06-30 RX ORDER — SUCCINYLCHOLINE/SOD CL,ISO/PF 100 MG/5ML
SYRINGE (ML) INTRAVENOUS PRN
Status: DISCONTINUED | OUTPATIENT
Start: 2023-06-30 | End: 2023-06-30 | Stop reason: SDUPTHER

## 2023-06-30 RX ORDER — SODIUM CHLORIDE 9 MG/ML
INJECTION, SOLUTION INTRAVENOUS PRN
Status: DISCONTINUED | OUTPATIENT
Start: 2023-06-30 | End: 2023-06-30 | Stop reason: HOSPADM

## 2023-06-30 RX ORDER — SODIUM CHLORIDE, SODIUM LACTATE, POTASSIUM CHLORIDE, CALCIUM CHLORIDE 600; 310; 30; 20 MG/100ML; MG/100ML; MG/100ML; MG/100ML
INJECTION, SOLUTION INTRAVENOUS CONTINUOUS
Status: DISCONTINUED | OUTPATIENT
Start: 2023-06-30 | End: 2023-07-01

## 2023-06-30 RX ORDER — ONDANSETRON 2 MG/ML
4 INJECTION INTRAMUSCULAR; INTRAVENOUS
Status: DISCONTINUED | OUTPATIENT
Start: 2023-06-30 | End: 2023-06-30 | Stop reason: HOSPADM

## 2023-06-30 RX ORDER — FENTANYL CITRATE 50 UG/ML
100 INJECTION, SOLUTION INTRAMUSCULAR; INTRAVENOUS EVERY 5 MIN PRN
Status: COMPLETED | OUTPATIENT
Start: 2023-06-30 | End: 2023-06-30

## 2023-06-30 RX ORDER — HYDROMORPHONE HYDROCHLORIDE 2 MG/ML
0.25 INJECTION, SOLUTION INTRAMUSCULAR; INTRAVENOUS; SUBCUTANEOUS EVERY 5 MIN PRN
Status: COMPLETED | OUTPATIENT
Start: 2023-06-30 | End: 2023-07-01

## 2023-06-30 RX ORDER — INSULIN LISPRO 100 [IU]/ML
0-4 INJECTION, SOLUTION INTRAVENOUS; SUBCUTANEOUS
Status: DISCONTINUED | OUTPATIENT
Start: 2023-06-30 | End: 2023-07-07 | Stop reason: HOSPADM

## 2023-06-30 RX ORDER — PROPOFOL 10 MG/ML
INJECTION, EMULSION INTRAVENOUS PRN
Status: DISCONTINUED | OUTPATIENT
Start: 2023-06-30 | End: 2023-06-30

## 2023-06-30 RX ADMIN — FENTANYL CITRATE 100 MCG: 50 INJECTION, SOLUTION INTRAMUSCULAR; INTRAVENOUS at 14:39

## 2023-06-30 RX ADMIN — NEOSTIGMINE METHYLSULFATE 3 MG: 1 INJECTION, SOLUTION INTRAVENOUS at 14:01

## 2023-06-30 RX ADMIN — HYDROMORPHONE HYDROCHLORIDE 0.25 MG: 2 INJECTION, SOLUTION INTRAMUSCULAR; INTRAVENOUS; SUBCUTANEOUS at 15:35

## 2023-06-30 RX ADMIN — PROPOFOL 150 MG: 10 INJECTION, EMULSION INTRAVENOUS at 12:29

## 2023-06-30 RX ADMIN — Medication 100 MG: at 12:29

## 2023-06-30 RX ADMIN — ROCURONIUM BROMIDE 10 MG: 10 INJECTION, SOLUTION INTRAVENOUS at 13:21

## 2023-06-30 RX ADMIN — DEXMEDETOMIDINE HYDROCHLORIDE 6 MCG: 100 INJECTION, SOLUTION INTRAVENOUS at 13:25

## 2023-06-30 RX ADMIN — HYDROMORPHONE HYDROCHLORIDE 0.25 MG: 2 INJECTION, SOLUTION INTRAMUSCULAR; INTRAVENOUS; SUBCUTANEOUS at 15:31

## 2023-06-30 RX ADMIN — FENTANYL CITRATE 50 MCG: 50 INJECTION INTRAMUSCULAR; INTRAVENOUS at 13:52

## 2023-06-30 RX ADMIN — DEXMEDETOMIDINE HYDROCHLORIDE 6 MCG: 100 INJECTION, SOLUTION INTRAVENOUS at 13:07

## 2023-06-30 RX ADMIN — MAGNESIUM SULFATE HEPTAHYDRATE 1000 MG: 1 INJECTION, SOLUTION INTRAVENOUS at 12:37

## 2023-06-30 RX ADMIN — DEXMEDETOMIDINE HYDROCHLORIDE 6 MCG: 100 INJECTION, SOLUTION INTRAVENOUS at 12:52

## 2023-06-30 RX ADMIN — SODIUM CHLORIDE: 9 INJECTION, SOLUTION INTRAVENOUS at 10:51

## 2023-06-30 RX ADMIN — DEXMEDETOMIDINE HYDROCHLORIDE 6 MCG: 100 INJECTION, SOLUTION INTRAVENOUS at 13:10

## 2023-06-30 RX ADMIN — DEXMEDETOMIDINE HYDROCHLORIDE 4 MCG: 100 INJECTION, SOLUTION INTRAVENOUS at 13:12

## 2023-06-30 RX ADMIN — FENTANYL CITRATE 50 MCG: 50 INJECTION, SOLUTION INTRAMUSCULAR; INTRAVENOUS at 15:12

## 2023-06-30 RX ADMIN — Medication 100 MCG: at 12:41

## 2023-06-30 RX ADMIN — Medication 10 MG: at 12:38

## 2023-06-30 RX ADMIN — GLYCOPYRROLATE 0.6 MG: 0.2 INJECTION INTRAMUSCULAR; INTRAVENOUS at 14:01

## 2023-06-30 RX ADMIN — FENTANYL CITRATE 100 MCG: 50 INJECTION, SOLUTION INTRAMUSCULAR; INTRAVENOUS at 14:55

## 2023-06-30 RX ADMIN — FENTANYL CITRATE 50 MCG: 50 INJECTION INTRAMUSCULAR; INTRAVENOUS at 12:42

## 2023-06-30 RX ADMIN — HYDROMORPHONE HYDROCHLORIDE 1 MG: 2 INJECTION, SOLUTION INTRAMUSCULAR; INTRAVENOUS; SUBCUTANEOUS at 18:45

## 2023-06-30 RX ADMIN — DEXMEDETOMIDINE HYDROCHLORIDE 8 MCG: 100 INJECTION, SOLUTION INTRAVENOUS at 12:29

## 2023-06-30 RX ADMIN — DEXMEDETOMIDINE HYDROCHLORIDE 4 MCG: 100 INJECTION, SOLUTION INTRAVENOUS at 12:32

## 2023-06-30 RX ADMIN — ROCURONIUM BROMIDE 20 MG: 10 INJECTION, SOLUTION INTRAVENOUS at 12:33

## 2023-06-30 RX ADMIN — FENTANYL CITRATE 50 MCG: 50 INJECTION, SOLUTION INTRAMUSCULAR; INTRAVENOUS at 15:03

## 2023-06-30 RX ADMIN — WATER 2000 MG: 1 INJECTION, SOLUTION INTRAMUSCULAR; INTRAVENOUS; SUBCUTANEOUS at 12:35

## 2023-06-30 RX ADMIN — ONDANSETRON 4 MG: 2 INJECTION INTRAMUSCULAR; INTRAVENOUS at 12:29

## 2023-06-30 RX ADMIN — DEXMEDETOMIDINE HYDROCHLORIDE 6 MCG: 100 INJECTION, SOLUTION INTRAVENOUS at 13:49

## 2023-06-30 RX ADMIN — FAMOTIDINE 20 MG: 10 INJECTION, SOLUTION INTRAVENOUS at 10:54

## 2023-06-30 RX ADMIN — DEXAMETHASONE SODIUM PHOSPHATE 4 MG: 4 INJECTION, SOLUTION INTRAMUSCULAR; INTRAVENOUS at 12:29

## 2023-06-30 RX ADMIN — METOPROLOL TARTRATE 25 MG: 25 TABLET, FILM COATED ORAL at 20:41

## 2023-06-30 RX ADMIN — DEXMEDETOMIDINE HYDROCHLORIDE 6 MCG: 100 INJECTION, SOLUTION INTRAVENOUS at 12:54

## 2023-06-30 RX ADMIN — SODIUM CHLORIDE, POTASSIUM CHLORIDE, SODIUM LACTATE AND CALCIUM CHLORIDE: 600; 310; 30; 20 INJECTION, SOLUTION INTRAVENOUS at 16:22

## 2023-06-30 RX ADMIN — DEXMEDETOMIDINE HYDROCHLORIDE 8 MCG: 100 INJECTION, SOLUTION INTRAVENOUS at 13:46

## 2023-06-30 ASSESSMENT — PAIN SCALES - GENERAL
PAINLEVEL_OUTOF10: 10
PAINLEVEL_OUTOF10: 6
PAINLEVEL_OUTOF10: 6
PAINLEVEL_OUTOF10: 9
PAINLEVEL_OUTOF10: 6
PAINLEVEL_OUTOF10: 10
PAINLEVEL_OUTOF10: 9
PAINLEVEL_OUTOF10: 6
PAINLEVEL_OUTOF10: 0
PAINLEVEL_OUTOF10: 9

## 2023-06-30 ASSESSMENT — PAIN DESCRIPTION - LOCATION
LOCATION: ABDOMEN

## 2023-06-30 ASSESSMENT — PAIN DESCRIPTION - DESCRIPTORS: DESCRIPTORS: STABBING

## 2023-06-30 ASSESSMENT — PAIN DESCRIPTION - ORIENTATION: ORIENTATION: OTHER (COMMENT)

## 2023-06-30 ASSESSMENT — ENCOUNTER SYMPTOMS: SHORTNESS OF BREATH: 1

## 2023-06-30 ASSESSMENT — LIFESTYLE VARIABLES
HOW OFTEN DO YOU HAVE A DRINK CONTAINING ALCOHOL: NEVER
HOW MANY STANDARD DRINKS CONTAINING ALCOHOL DO YOU HAVE ON A TYPICAL DAY: PATIENT DOES NOT DRINK

## 2023-06-30 ASSESSMENT — PAIN - FUNCTIONAL ASSESSMENT: PAIN_FUNCTIONAL_ASSESSMENT: 0-10

## 2023-06-30 ASSESSMENT — COPD QUESTIONNAIRES: CAT_SEVERITY: MILD

## 2023-06-30 NOTE — Clinical Note
History and physical documented and up to date, allergies reviewed, lab results reviewed, pre-procedure education provided, patient verbalized understanding of procedure, procedural consent signed and patient is NPO. 64-year-old male with foreign body sensation in the left side of the throat since last night.  He thinks he swallowed a fishbone.  He is tolerating p.o., able to drink liquids and eat soft solids.  He has no shortness of breath, no choking or coughing episodes, he has some pain with swallowing.

## 2023-07-01 LAB
ANION GAP SERPL CALC-SCNC: 6 MMOL/L (ref 3–18)
BASOPHILS # BLD: 0 K/UL (ref 0–0.1)
BASOPHILS NFR BLD: 0 % (ref 0–2)
BUN SERPL-MCNC: 46 MG/DL (ref 7–18)
BUN/CREAT SERPL: 19 (ref 12–20)
CALCIUM SERPL-MCNC: 8.7 MG/DL (ref 8.5–10.1)
CHLORIDE SERPL-SCNC: 112 MMOL/L (ref 100–111)
CO2 SERPL-SCNC: 22 MMOL/L (ref 21–32)
CREAT SERPL-MCNC: 2.42 MG/DL (ref 0.6–1.3)
DIFFERENTIAL METHOD BLD: ABNORMAL
EOSINOPHIL # BLD: 0 K/UL (ref 0–0.4)
EOSINOPHIL NFR BLD: 0 % (ref 0–5)
ERYTHROCYTE [DISTWIDTH] IN BLOOD BY AUTOMATED COUNT: 18 % (ref 11.6–14.5)
GLUCOSE BLD STRIP.AUTO-MCNC: 140 MG/DL (ref 70–110)
GLUCOSE BLD STRIP.AUTO-MCNC: 147 MG/DL (ref 70–110)
GLUCOSE BLD STRIP.AUTO-MCNC: 161 MG/DL (ref 70–110)
GLUCOSE BLD STRIP.AUTO-MCNC: 183 MG/DL (ref 70–110)
GLUCOSE SERPL-MCNC: 188 MG/DL (ref 74–99)
HCT VFR BLD AUTO: 29.3 % (ref 35–45)
HGB BLD-MCNC: 8.7 G/DL (ref 12–16)
IMM GRANULOCYTES # BLD AUTO: 0 K/UL (ref 0–0.04)
IMM GRANULOCYTES NFR BLD AUTO: 0 % (ref 0–0.5)
LYMPHOCYTES # BLD: 1.3 K/UL (ref 0.9–3.6)
LYMPHOCYTES NFR BLD: 9 % (ref 21–52)
MCH RBC QN AUTO: 28.6 PG (ref 24–34)
MCHC RBC AUTO-ENTMCNC: 29.7 G/DL (ref 31–37)
MCV RBC AUTO: 96.4 FL (ref 78–100)
MONOCYTES # BLD: 0.4 K/UL (ref 0.05–1.2)
MONOCYTES NFR BLD: 3 % (ref 3–10)
NEUTS BAND NFR BLD MANUAL: 5 %
NEUTS SEG # BLD: 12.6 K/UL (ref 1.8–8)
NEUTS SEG NFR BLD: 83 % (ref 40–73)
NRBC # BLD: 0.12 K/UL (ref 0–0.01)
NRBC BLD-RTO: 0.8 PER 100 WBC
PLATELET # BLD AUTO: 396 K/UL (ref 135–420)
PLATELET COMMENT: ABNORMAL
PMV BLD AUTO: 9.9 FL (ref 9.2–11.8)
POTASSIUM SERPL-SCNC: 5.4 MMOL/L (ref 3.5–5.5)
RBC # BLD AUTO: 3.04 M/UL (ref 4.2–5.3)
RBC MORPH BLD: ABNORMAL
RBC MORPH BLD: ABNORMAL
SODIUM SERPL-SCNC: 140 MMOL/L (ref 136–145)
WBC # BLD AUTO: 14.3 K/UL (ref 4.6–13.2)

## 2023-07-01 PROCEDURE — 6370000000 HC RX 637 (ALT 250 FOR IP): Performed by: FAMILY MEDICINE

## 2023-07-01 PROCEDURE — 85025 COMPLETE CBC W/AUTO DIFF WBC: CPT

## 2023-07-01 PROCEDURE — 1100000000 HC RM PRIVATE

## 2023-07-01 PROCEDURE — 6370000000 HC RX 637 (ALT 250 FOR IP): Performed by: SURGERY

## 2023-07-01 PROCEDURE — 6360000002 HC RX W HCPCS: Performed by: STUDENT IN AN ORGANIZED HEALTH CARE EDUCATION/TRAINING PROGRAM

## 2023-07-01 PROCEDURE — 99223 1ST HOSP IP/OBS HIGH 75: CPT | Performed by: FAMILY MEDICINE

## 2023-07-01 PROCEDURE — 82962 GLUCOSE BLOOD TEST: CPT

## 2023-07-01 PROCEDURE — 6360000002 HC RX W HCPCS: Performed by: SURGERY

## 2023-07-01 PROCEDURE — 87086 URINE CULTURE/COLONY COUNT: CPT

## 2023-07-01 PROCEDURE — 2580000003 HC RX 258: Performed by: SURGERY

## 2023-07-01 PROCEDURE — 36415 COLL VENOUS BLD VENIPUNCTURE: CPT

## 2023-07-01 PROCEDURE — 80048 BASIC METABOLIC PNL TOTAL CA: CPT

## 2023-07-01 RX ORDER — DEXTROSE MONOHYDRATE 100 MG/ML
INJECTION, SOLUTION INTRAVENOUS CONTINUOUS PRN
Status: DISCONTINUED | OUTPATIENT
Start: 2023-07-01 | End: 2023-07-07 | Stop reason: HOSPADM

## 2023-07-01 RX ORDER — IPRATROPIUM BROMIDE AND ALBUTEROL SULFATE 2.5; .5 MG/3ML; MG/3ML
1 SOLUTION RESPIRATORY (INHALATION) EVERY 4 HOURS PRN
Status: DISCONTINUED | OUTPATIENT
Start: 2023-07-01 | End: 2023-07-07 | Stop reason: HOSPADM

## 2023-07-01 RX ORDER — TIZANIDINE 4 MG/1
4 TABLET ORAL EVERY 6 HOURS PRN
Status: DISCONTINUED | OUTPATIENT
Start: 2023-07-01 | End: 2023-07-07 | Stop reason: HOSPADM

## 2023-07-01 RX ORDER — ATORVASTATIN CALCIUM 40 MG/1
80 TABLET, FILM COATED ORAL NIGHTLY
Status: DISCONTINUED | OUTPATIENT
Start: 2023-07-01 | End: 2023-07-07 | Stop reason: HOSPADM

## 2023-07-01 RX ORDER — GABAPENTIN 400 MG/1
400 CAPSULE ORAL DAILY
Status: DISCONTINUED | OUTPATIENT
Start: 2023-07-01 | End: 2023-07-07 | Stop reason: HOSPADM

## 2023-07-01 RX ORDER — BUMETANIDE 1 MG/1
2 TABLET ORAL 2 TIMES DAILY
Status: DISCONTINUED | OUTPATIENT
Start: 2023-07-01 | End: 2023-07-07 | Stop reason: HOSPADM

## 2023-07-01 RX ADMIN — HYDROMORPHONE HYDROCHLORIDE 1 MG: 2 INJECTION, SOLUTION INTRAMUSCULAR; INTRAVENOUS; SUBCUTANEOUS at 03:37

## 2023-07-01 RX ADMIN — SODIUM CHLORIDE, POTASSIUM CHLORIDE, SODIUM LACTATE AND CALCIUM CHLORIDE: 600; 310; 30; 20 INJECTION, SOLUTION INTRAVENOUS at 09:51

## 2023-07-01 RX ADMIN — BUMETANIDE 2 MG: 1 TABLET ORAL at 20:46

## 2023-07-01 RX ADMIN — SODIUM CHLORIDE, POTASSIUM CHLORIDE, SODIUM LACTATE AND CALCIUM CHLORIDE: 600; 310; 30; 20 INJECTION, SOLUTION INTRAVENOUS at 00:09

## 2023-07-01 RX ADMIN — METOPROLOL TARTRATE 25 MG: 25 TABLET, FILM COATED ORAL at 09:12

## 2023-07-01 RX ADMIN — METOPROLOL TARTRATE 25 MG: 25 TABLET, FILM COATED ORAL at 20:47

## 2023-07-01 RX ADMIN — HYDROMORPHONE HYDROCHLORIDE 1 MG: 2 INJECTION, SOLUTION INTRAMUSCULAR; INTRAVENOUS; SUBCUTANEOUS at 22:52

## 2023-07-01 RX ADMIN — HYDROMORPHONE HYDROCHLORIDE 1 MG: 2 INJECTION, SOLUTION INTRAMUSCULAR; INTRAVENOUS; SUBCUTANEOUS at 00:06

## 2023-07-01 RX ADMIN — HYDROMORPHONE HYDROCHLORIDE 0.25 MG: 2 INJECTION, SOLUTION INTRAMUSCULAR; INTRAVENOUS; SUBCUTANEOUS at 16:31

## 2023-07-01 RX ADMIN — ATORVASTATIN CALCIUM 80 MG: 40 TABLET, FILM COATED ORAL at 20:43

## 2023-07-01 RX ADMIN — TIZANIDINE 4 MG: 4 TABLET ORAL at 20:48

## 2023-07-01 ASSESSMENT — PAIN DESCRIPTION - ORIENTATION
ORIENTATION: LOWER
ORIENTATION: ANTERIOR
ORIENTATION: MID
ORIENTATION: MID
ORIENTATION: ANTERIOR

## 2023-07-01 ASSESSMENT — PAIN DESCRIPTION - LOCATION
LOCATION: ABDOMEN

## 2023-07-01 ASSESSMENT — PAIN SCALES - GENERAL
PAINLEVEL_OUTOF10: 10
PAINLEVEL_OUTOF10: 0
PAINLEVEL_OUTOF10: 10
PAINLEVEL_OUTOF10: 10

## 2023-07-01 ASSESSMENT — PAIN DESCRIPTION - DESCRIPTORS
DESCRIPTORS: SHARP
DESCRIPTORS: ACHING
DESCRIPTORS: SHARP

## 2023-07-02 LAB
ANION GAP SERPL CALC-SCNC: 6 MMOL/L (ref 3–18)
BACTERIA SPEC CULT: NORMAL
BASOPHILS # BLD: 0 K/UL (ref 0–0.1)
BASOPHILS NFR BLD: 0 % (ref 0–2)
BUN SERPL-MCNC: 48 MG/DL (ref 7–18)
BUN/CREAT SERPL: 19 (ref 12–20)
CALCIUM SERPL-MCNC: 8.8 MG/DL (ref 8.5–10.1)
CHLORIDE SERPL-SCNC: 107 MMOL/L (ref 100–111)
CO2 SERPL-SCNC: 24 MMOL/L (ref 21–32)
CREAT SERPL-MCNC: 2.54 MG/DL (ref 0.6–1.3)
DIFFERENTIAL METHOD BLD: ABNORMAL
EOSINOPHIL # BLD: 0 K/UL (ref 0–0.4)
EOSINOPHIL NFR BLD: 0 % (ref 0–5)
ERYTHROCYTE [DISTWIDTH] IN BLOOD BY AUTOMATED COUNT: 18.3 % (ref 11.6–14.5)
GLUCOSE BLD STRIP.AUTO-MCNC: 117 MG/DL (ref 70–110)
GLUCOSE BLD STRIP.AUTO-MCNC: 145 MG/DL (ref 70–110)
GLUCOSE BLD STRIP.AUTO-MCNC: 148 MG/DL (ref 70–110)
GLUCOSE BLD STRIP.AUTO-MCNC: 152 MG/DL (ref 70–110)
GLUCOSE SERPL-MCNC: 131 MG/DL (ref 74–99)
HCT VFR BLD AUTO: 24.5 % (ref 35–45)
HGB BLD-MCNC: 7.4 G/DL (ref 12–16)
IMM GRANULOCYTES # BLD AUTO: 0.1 K/UL (ref 0–0.04)
IMM GRANULOCYTES NFR BLD AUTO: 1 % (ref 0–0.5)
LYMPHOCYTES # BLD: 1.3 K/UL (ref 0.9–3.6)
LYMPHOCYTES NFR BLD: 13 % (ref 21–52)
MCH RBC QN AUTO: 28.6 PG (ref 24–34)
MCHC RBC AUTO-ENTMCNC: 30.2 G/DL (ref 31–37)
MCV RBC AUTO: 94.6 FL (ref 78–100)
MONOCYTES # BLD: 0.7 K/UL (ref 0.05–1.2)
MONOCYTES NFR BLD: 7 % (ref 3–10)
NEUTS SEG # BLD: 7.6 K/UL (ref 1.8–8)
NEUTS SEG NFR BLD: 78 % (ref 40–73)
NRBC # BLD: 0.1 K/UL (ref 0–0.01)
NRBC BLD-RTO: 1 PER 100 WBC
PLATELET # BLD AUTO: 335 K/UL (ref 135–420)
PMV BLD AUTO: 9.7 FL (ref 9.2–11.8)
POTASSIUM SERPL-SCNC: 4.7 MMOL/L (ref 3.5–5.5)
RBC # BLD AUTO: 2.59 M/UL (ref 4.2–5.3)
SERVICE CMNT-IMP: NORMAL
SODIUM SERPL-SCNC: 137 MMOL/L (ref 136–145)
WBC # BLD AUTO: 9.8 K/UL (ref 4.6–13.2)

## 2023-07-02 PROCEDURE — 1100000000 HC RM PRIVATE

## 2023-07-02 PROCEDURE — 99232 SBSQ HOSP IP/OBS MODERATE 35: CPT | Performed by: FAMILY MEDICINE

## 2023-07-02 PROCEDURE — 82962 GLUCOSE BLOOD TEST: CPT

## 2023-07-02 PROCEDURE — 36415 COLL VENOUS BLD VENIPUNCTURE: CPT

## 2023-07-02 PROCEDURE — 80048 BASIC METABOLIC PNL TOTAL CA: CPT

## 2023-07-02 PROCEDURE — 6360000002 HC RX W HCPCS: Performed by: SURGERY

## 2023-07-02 PROCEDURE — 85025 COMPLETE CBC W/AUTO DIFF WBC: CPT

## 2023-07-02 PROCEDURE — 6370000000 HC RX 637 (ALT 250 FOR IP): Performed by: SURGERY

## 2023-07-02 PROCEDURE — 6370000000 HC RX 637 (ALT 250 FOR IP): Performed by: FAMILY MEDICINE

## 2023-07-02 PROCEDURE — 97166 OT EVAL MOD COMPLEX 45 MIN: CPT

## 2023-07-02 RX ORDER — OXYCODONE HYDROCHLORIDE AND ACETAMINOPHEN 5; 325 MG/1; MG/1
1 TABLET ORAL EVERY 4 HOURS PRN
Status: DISCONTINUED | OUTPATIENT
Start: 2023-07-02 | End: 2023-07-07 | Stop reason: HOSPADM

## 2023-07-02 RX ADMIN — METOPROLOL TARTRATE 25 MG: 25 TABLET, FILM COATED ORAL at 09:11

## 2023-07-02 RX ADMIN — OXYCODONE HYDROCHLORIDE AND ACETAMINOPHEN 1 TABLET: 5; 325 TABLET ORAL at 23:40

## 2023-07-02 RX ADMIN — METOPROLOL TARTRATE 25 MG: 25 TABLET, FILM COATED ORAL at 20:31

## 2023-07-02 RX ADMIN — GABAPENTIN 400 MG: 400 CAPSULE ORAL at 09:11

## 2023-07-02 RX ADMIN — BUMETANIDE 2 MG: 1 TABLET ORAL at 20:31

## 2023-07-02 RX ADMIN — BUMETANIDE 2 MG: 1 TABLET ORAL at 09:11

## 2023-07-02 RX ADMIN — HYDROMORPHONE HYDROCHLORIDE 1 MG: 2 INJECTION, SOLUTION INTRAMUSCULAR; INTRAVENOUS; SUBCUTANEOUS at 04:32

## 2023-07-02 RX ADMIN — OXYCODONE HYDROCHLORIDE AND ACETAMINOPHEN 1 TABLET: 5; 325 TABLET ORAL at 11:33

## 2023-07-02 RX ADMIN — ATORVASTATIN CALCIUM 80 MG: 40 TABLET, FILM COATED ORAL at 20:31

## 2023-07-02 RX ADMIN — ALBUTEROL SULFATE 2.5 MG: 2.5 SOLUTION RESPIRATORY (INHALATION) at 04:28

## 2023-07-02 ASSESSMENT — PAIN SCALES - GENERAL
PAINLEVEL_OUTOF10: 10
PAINLEVEL_OUTOF10: 9
PAINLEVEL_OUTOF10: 10

## 2023-07-02 ASSESSMENT — PAIN DESCRIPTION - LOCATION
LOCATION: ABDOMEN

## 2023-07-02 ASSESSMENT — PAIN DESCRIPTION - DESCRIPTORS
DESCRIPTORS: ACHING
DESCRIPTORS: SHARP
DESCRIPTORS: SHARP

## 2023-07-02 ASSESSMENT — PAIN DESCRIPTION - ORIENTATION
ORIENTATION: MID
ORIENTATION: MID

## 2023-07-02 ASSESSMENT — PAIN - FUNCTIONAL ASSESSMENT: PAIN_FUNCTIONAL_ASSESSMENT: ACTIVITIES ARE NOT PREVENTED

## 2023-07-03 LAB
ANION GAP SERPL CALC-SCNC: 7 MMOL/L (ref 3–18)
BASOPHILS # BLD: 0 K/UL (ref 0–0.1)
BASOPHILS NFR BLD: 0 % (ref 0–2)
BUN SERPL-MCNC: 55 MG/DL (ref 7–18)
BUN/CREAT SERPL: 21 (ref 12–20)
CALCIUM SERPL-MCNC: 9 MG/DL (ref 8.5–10.1)
CHLORIDE SERPL-SCNC: 106 MMOL/L (ref 100–111)
CO2 SERPL-SCNC: 24 MMOL/L (ref 21–32)
CREAT SERPL-MCNC: 2.65 MG/DL (ref 0.6–1.3)
DIFFERENTIAL METHOD BLD: ABNORMAL
EOSINOPHIL # BLD: 0.2 K/UL (ref 0–0.4)
EOSINOPHIL NFR BLD: 2 % (ref 0–5)
ERYTHROCYTE [DISTWIDTH] IN BLOOD BY AUTOMATED COUNT: 18.3 % (ref 11.6–14.5)
GLUCOSE BLD STRIP.AUTO-MCNC: 110 MG/DL (ref 70–110)
GLUCOSE BLD STRIP.AUTO-MCNC: 116 MG/DL (ref 70–110)
GLUCOSE BLD STRIP.AUTO-MCNC: 119 MG/DL (ref 70–110)
GLUCOSE BLD STRIP.AUTO-MCNC: 121 MG/DL (ref 70–110)
GLUCOSE SERPL-MCNC: 109 MG/DL (ref 74–99)
HCT VFR BLD AUTO: 25.1 % (ref 35–45)
HGB BLD-MCNC: 7.5 G/DL (ref 12–16)
IMM GRANULOCYTES # BLD AUTO: 0.1 K/UL (ref 0–0.04)
IMM GRANULOCYTES NFR BLD AUTO: 1 % (ref 0–0.5)
LYMPHOCYTES # BLD: 1.5 K/UL (ref 0.9–3.6)
LYMPHOCYTES NFR BLD: 17 % (ref 21–52)
MCH RBC QN AUTO: 28.2 PG (ref 24–34)
MCHC RBC AUTO-ENTMCNC: 29.9 G/DL (ref 31–37)
MCV RBC AUTO: 94.4 FL (ref 78–100)
MONOCYTES # BLD: 0.7 K/UL (ref 0.05–1.2)
MONOCYTES NFR BLD: 8 % (ref 3–10)
NEUTS SEG # BLD: 6.4 K/UL (ref 1.8–8)
NEUTS SEG NFR BLD: 72 % (ref 40–73)
NRBC # BLD: 0.14 K/UL (ref 0–0.01)
NRBC BLD-RTO: 1.6 PER 100 WBC
PLATELET # BLD AUTO: 346 K/UL (ref 135–420)
PMV BLD AUTO: 9.5 FL (ref 9.2–11.8)
POTASSIUM SERPL-SCNC: 4.5 MMOL/L (ref 3.5–5.5)
RBC # BLD AUTO: 2.66 M/UL (ref 4.2–5.3)
SODIUM SERPL-SCNC: 137 MMOL/L (ref 136–145)
WBC # BLD AUTO: 8.9 K/UL (ref 4.6–13.2)

## 2023-07-03 PROCEDURE — 6370000000 HC RX 637 (ALT 250 FOR IP): Performed by: SURGERY

## 2023-07-03 PROCEDURE — 94761 N-INVAS EAR/PLS OXIMETRY MLT: CPT

## 2023-07-03 PROCEDURE — 85025 COMPLETE CBC W/AUTO DIFF WBC: CPT

## 2023-07-03 PROCEDURE — 36415 COLL VENOUS BLD VENIPUNCTURE: CPT

## 2023-07-03 PROCEDURE — 82962 GLUCOSE BLOOD TEST: CPT

## 2023-07-03 PROCEDURE — 1100000000 HC RM PRIVATE

## 2023-07-03 PROCEDURE — 80048 BASIC METABOLIC PNL TOTAL CA: CPT

## 2023-07-03 PROCEDURE — 2700000000 HC OXYGEN THERAPY PER DAY

## 2023-07-03 PROCEDURE — 97116 GAIT TRAINING THERAPY: CPT

## 2023-07-03 PROCEDURE — 6370000000 HC RX 637 (ALT 250 FOR IP): Performed by: FAMILY MEDICINE

## 2023-07-03 PROCEDURE — 97161 PT EVAL LOW COMPLEX 20 MIN: CPT

## 2023-07-03 RX ADMIN — TIZANIDINE 4 MG: 4 TABLET ORAL at 20:43

## 2023-07-03 RX ADMIN — ATORVASTATIN CALCIUM 80 MG: 40 TABLET, FILM COATED ORAL at 20:40

## 2023-07-03 RX ADMIN — OXYCODONE HYDROCHLORIDE AND ACETAMINOPHEN 1 TABLET: 5; 325 TABLET ORAL at 07:40

## 2023-07-03 RX ADMIN — METOPROLOL TARTRATE 25 MG: 25 TABLET, FILM COATED ORAL at 20:40

## 2023-07-03 RX ADMIN — BUMETANIDE 2 MG: 1 TABLET ORAL at 07:40

## 2023-07-03 RX ADMIN — METOPROLOL TARTRATE 25 MG: 25 TABLET, FILM COATED ORAL at 07:43

## 2023-07-03 RX ADMIN — OXYCODONE HYDROCHLORIDE AND ACETAMINOPHEN 1 TABLET: 5; 325 TABLET ORAL at 23:14

## 2023-07-03 RX ADMIN — OXYCODONE HYDROCHLORIDE AND ACETAMINOPHEN 1 TABLET: 5; 325 TABLET ORAL at 15:43

## 2023-07-03 RX ADMIN — OXYCODONE HYDROCHLORIDE AND ACETAMINOPHEN 1 TABLET: 5; 325 TABLET ORAL at 11:53

## 2023-07-03 RX ADMIN — BUMETANIDE 2 MG: 1 TABLET ORAL at 20:40

## 2023-07-03 RX ADMIN — GABAPENTIN 400 MG: 400 CAPSULE ORAL at 20:40

## 2023-07-03 ASSESSMENT — PAIN SCALES - GENERAL
PAINLEVEL_OUTOF10: 10
PAINLEVEL_OUTOF10: 4
PAINLEVEL_OUTOF10: 10
PAINLEVEL_OUTOF10: 10

## 2023-07-03 ASSESSMENT — PAIN DESCRIPTION - DESCRIPTORS
DESCRIPTORS: ACHING
DESCRIPTORS: ACHING
DESCRIPTORS: SHARP
DESCRIPTORS: ACHING
DESCRIPTORS: ACHING;CRAMPING

## 2023-07-03 ASSESSMENT — PAIN DESCRIPTION - FREQUENCY
FREQUENCY: INTERMITTENT

## 2023-07-03 ASSESSMENT — PAIN DESCRIPTION - LOCATION
LOCATION: ABDOMEN

## 2023-07-03 ASSESSMENT — PAIN - FUNCTIONAL ASSESSMENT
PAIN_FUNCTIONAL_ASSESSMENT: PREVENTS OR INTERFERES SOME ACTIVE ACTIVITIES AND ADLS

## 2023-07-03 ASSESSMENT — PAIN DESCRIPTION - PAIN TYPE
TYPE: SURGICAL PAIN

## 2023-07-03 ASSESSMENT — PAIN DESCRIPTION - ORIENTATION
ORIENTATION: ANTERIOR
ORIENTATION: ANTERIOR
ORIENTATION: MID
ORIENTATION: MID

## 2023-07-03 ASSESSMENT — PAIN SCALES - WONG BAKER: WONGBAKER_NUMERICALRESPONSE: 4

## 2023-07-03 NOTE — CARE COORDINATION
2nd attempt made to complete CHG/[facility]ic[facility]. Pt declined for 2nd time and verbalized she would consider CHG bath later today.  Day shift RN and charge made aware of pt's declination

## 2023-07-04 PROBLEM — Z93.3 COLOSTOMY IN PLACE (HCC): Status: ACTIVE | Noted: 2023-07-04

## 2023-07-04 PROBLEM — K57.20 PERFORATED DIVERTICULUM OF LARGE INTESTINE: Status: ACTIVE | Noted: 2023-07-04

## 2023-07-04 PROBLEM — K66.0 ABDOMINAL ADHESIONS: Status: ACTIVE | Noted: 2023-07-04

## 2023-07-04 LAB
ANION GAP SERPL CALC-SCNC: 8 MMOL/L (ref 3–18)
BASOPHILS # BLD: 0 K/UL (ref 0–0.1)
BASOPHILS NFR BLD: 0 % (ref 0–2)
BUN SERPL-MCNC: 64 MG/DL (ref 7–18)
BUN/CREAT SERPL: 25 (ref 12–20)
CALCIUM SERPL-MCNC: 9.3 MG/DL (ref 8.5–10.1)
CHLORIDE SERPL-SCNC: 103 MMOL/L (ref 100–111)
CO2 SERPL-SCNC: 26 MMOL/L (ref 21–32)
CREAT SERPL-MCNC: 2.52 MG/DL (ref 0.6–1.3)
DIFFERENTIAL METHOD BLD: ABNORMAL
EOSINOPHIL # BLD: 0.2 K/UL (ref 0–0.4)
EOSINOPHIL NFR BLD: 2 % (ref 0–5)
ERYTHROCYTE [DISTWIDTH] IN BLOOD BY AUTOMATED COUNT: 17.6 % (ref 11.6–14.5)
GLUCOSE BLD STRIP.AUTO-MCNC: 113 MG/DL (ref 70–110)
GLUCOSE BLD STRIP.AUTO-MCNC: 118 MG/DL (ref 70–110)
GLUCOSE BLD STRIP.AUTO-MCNC: 134 MG/DL (ref 70–110)
GLUCOSE BLD STRIP.AUTO-MCNC: 136 MG/DL (ref 70–110)
GLUCOSE SERPL-MCNC: 112 MG/DL (ref 74–99)
HCT VFR BLD AUTO: 26.6 % (ref 35–45)
HGB BLD-MCNC: 7.9 G/DL (ref 12–16)
IMM GRANULOCYTES # BLD AUTO: 0.1 K/UL (ref 0–0.04)
IMM GRANULOCYTES NFR BLD AUTO: 1 % (ref 0–0.5)
LYMPHOCYTES # BLD: 1.5 K/UL (ref 0.9–3.6)
LYMPHOCYTES NFR BLD: 20 % (ref 21–52)
MCH RBC QN AUTO: 28.2 PG (ref 24–34)
MCHC RBC AUTO-ENTMCNC: 29.7 G/DL (ref 31–37)
MCV RBC AUTO: 95 FL (ref 78–100)
MONOCYTES # BLD: 0.7 K/UL (ref 0.05–1.2)
MONOCYTES NFR BLD: 9 % (ref 3–10)
NEUTS SEG # BLD: 4.8 K/UL (ref 1.8–8)
NEUTS SEG NFR BLD: 67 % (ref 40–73)
NRBC # BLD: 0.16 K/UL (ref 0–0.01)
NRBC BLD-RTO: 2.2 PER 100 WBC
PLATELET # BLD AUTO: 388 K/UL (ref 135–420)
PMV BLD AUTO: 9.7 FL (ref 9.2–11.8)
POTASSIUM SERPL-SCNC: 4.2 MMOL/L (ref 3.5–5.5)
RBC # BLD AUTO: 2.8 M/UL (ref 4.2–5.3)
SODIUM SERPL-SCNC: 137 MMOL/L (ref 136–145)
WBC # BLD AUTO: 7.2 K/UL (ref 4.6–13.2)

## 2023-07-04 PROCEDURE — 94761 N-INVAS EAR/PLS OXIMETRY MLT: CPT

## 2023-07-04 PROCEDURE — 36415 COLL VENOUS BLD VENIPUNCTURE: CPT

## 2023-07-04 PROCEDURE — 97535 SELF CARE MNGMENT TRAINING: CPT

## 2023-07-04 PROCEDURE — 80048 BASIC METABOLIC PNL TOTAL CA: CPT

## 2023-07-04 PROCEDURE — 85025 COMPLETE CBC W/AUTO DIFF WBC: CPT

## 2023-07-04 PROCEDURE — 1100000000 HC RM PRIVATE

## 2023-07-04 PROCEDURE — 97530 THERAPEUTIC ACTIVITIES: CPT

## 2023-07-04 PROCEDURE — 6370000000 HC RX 637 (ALT 250 FOR IP): Performed by: SURGERY

## 2023-07-04 PROCEDURE — 82962 GLUCOSE BLOOD TEST: CPT

## 2023-07-04 PROCEDURE — 6370000000 HC RX 637 (ALT 250 FOR IP): Performed by: FAMILY MEDICINE

## 2023-07-04 RX ADMIN — METOPROLOL TARTRATE 25 MG: 25 TABLET, FILM COATED ORAL at 08:41

## 2023-07-04 RX ADMIN — OXYCODONE HYDROCHLORIDE AND ACETAMINOPHEN 1 TABLET: 5; 325 TABLET ORAL at 14:52

## 2023-07-04 RX ADMIN — METOPROLOL TARTRATE 25 MG: 25 TABLET, FILM COATED ORAL at 20:52

## 2023-07-04 RX ADMIN — OXYCODONE HYDROCHLORIDE AND ACETAMINOPHEN 1 TABLET: 5; 325 TABLET ORAL at 20:52

## 2023-07-04 RX ADMIN — OXYCODONE HYDROCHLORIDE AND ACETAMINOPHEN 1 TABLET: 5; 325 TABLET ORAL at 06:32

## 2023-07-04 RX ADMIN — ATORVASTATIN CALCIUM 80 MG: 40 TABLET, FILM COATED ORAL at 20:53

## 2023-07-04 RX ADMIN — BUMETANIDE 2 MG: 1 TABLET ORAL at 20:53

## 2023-07-04 RX ADMIN — GABAPENTIN 400 MG: 400 CAPSULE ORAL at 20:53

## 2023-07-04 RX ADMIN — BUMETANIDE 2 MG: 1 TABLET ORAL at 08:41

## 2023-07-04 ASSESSMENT — PAIN SCALES - GENERAL
PAINLEVEL_OUTOF10: 6
PAINLEVEL_OUTOF10: 2
PAINLEVEL_OUTOF10: 10
PAINLEVEL_OUTOF10: 8
PAINLEVEL_OUTOF10: 6
PAINLEVEL_OUTOF10: 3
PAINLEVEL_OUTOF10: 4

## 2023-07-04 ASSESSMENT — PAIN DESCRIPTION - LOCATION
LOCATION: ABDOMEN

## 2023-07-04 ASSESSMENT — PAIN DESCRIPTION - DESCRIPTORS
DESCRIPTORS: THROBBING
DESCRIPTORS: SHARP
DESCRIPTORS: ACHING;DISCOMFORT

## 2023-07-04 ASSESSMENT — PAIN DESCRIPTION - ORIENTATION: ORIENTATION: ANTERIOR

## 2023-07-04 ASSESSMENT — PAIN DESCRIPTION - PAIN TYPE: TYPE: SURGICAL PAIN

## 2023-07-04 NOTE — OP NOTE
[facility]  OPERATIVE REPORT    Name:  Christine Davis  MR#:   [id number]  :  1950  ACCOUNT #:  [de-identified]  DATE OF SERVICE:  2023    PREOPERATIVE DIAGNOSIS:  Perforated diverticulitis with colostomy. POSTOPERATIVE DIAGNOSIS:  Perforated diverticulitis with colostomy. PROCEDURES PERFORMED:  1. Exploratory laparotomy with extensive lysis of adhesions that took 30% of the surgery. 2.  Partial colectomy with primary anastomosis. 3.  Colostomy takedown. SURGEON:  Jacky Agustin MD    ASSISTANT:  Ferdinand Dakin. ANESTHESIA:  General.    COMPLICATIONS:  None. SPECIMENS REMOVED:  Colon. IMPLANTS:  None. ESTIMATED BLOOD LOSS:  Minimal.    PROCEDURE:  The patient was brought to operating room. Anesthesia was induced. The patient was placed in lithotomy position on the stirrups. Scrubbing and draping of the abdomen and perineal area were done in the usual manner. A time-out was performed. A midline laparotomy was performed. It went through the old scar. We went slightly higher than the old scar to try to go into a virgin abdomen. The skin and subcutaneous tissue were divided until we reached the fascia. The fascia on the upper abdomen was divided with the cautery, and then Metzenbaum and two DeBakey were used to enter the abdomen in the upper abdomen. There was no adhesion on the upper abdomen, but then when we reached at the level of the umbilicus, there was significant adhesion between the omentum and the small bowel and the anterior abdominal wall. We draped two Finis Lester and we were able to retract gently after grabbing the fascia with the Finis Lester and then dissecting these adhesions with Metzenbaum all the way until we reached the pelvic area. There was also significant adhesion between the omentum and the site of the colostomy on the left side for which we dissected again with Metzenbaum.   We reached the colostomy site and we dissected the colostomy from outside

## 2023-07-05 LAB
GLUCOSE BLD STRIP.AUTO-MCNC: 128 MG/DL (ref 70–110)
GLUCOSE BLD STRIP.AUTO-MCNC: 132 MG/DL (ref 70–110)
GLUCOSE BLD STRIP.AUTO-MCNC: 149 MG/DL (ref 70–110)
GLUCOSE BLD STRIP.AUTO-MCNC: 170 MG/DL (ref 70–110)

## 2023-07-05 PROCEDURE — 6370000000 HC RX 637 (ALT 250 FOR IP): Performed by: FAMILY MEDICINE

## 2023-07-05 PROCEDURE — 6370000000 HC RX 637 (ALT 250 FOR IP): Performed by: SURGERY

## 2023-07-05 PROCEDURE — 2700000000 HC OXYGEN THERAPY PER DAY

## 2023-07-05 PROCEDURE — 82962 GLUCOSE BLOOD TEST: CPT

## 2023-07-05 PROCEDURE — 1100000000 HC RM PRIVATE

## 2023-07-05 RX ADMIN — ATORVASTATIN CALCIUM 80 MG: 40 TABLET, FILM COATED ORAL at 21:11

## 2023-07-05 RX ADMIN — OXYCODONE HYDROCHLORIDE AND ACETAMINOPHEN 1 TABLET: 5; 325 TABLET ORAL at 08:10

## 2023-07-05 RX ADMIN — BUMETANIDE 2 MG: 1 TABLET ORAL at 21:09

## 2023-07-05 RX ADMIN — METOPROLOL TARTRATE 25 MG: 25 TABLET, FILM COATED ORAL at 21:11

## 2023-07-05 RX ADMIN — BUMETANIDE 2 MG: 1 TABLET ORAL at 08:10

## 2023-07-05 RX ADMIN — OXYCODONE HYDROCHLORIDE AND ACETAMINOPHEN 1 TABLET: 5; 325 TABLET ORAL at 15:36

## 2023-07-05 RX ADMIN — METOPROLOL TARTRATE 25 MG: 25 TABLET, FILM COATED ORAL at 08:10

## 2023-07-05 RX ADMIN — OXYCODONE HYDROCHLORIDE AND ACETAMINOPHEN 1 TABLET: 5; 325 TABLET ORAL at 21:10

## 2023-07-05 RX ADMIN — GABAPENTIN 400 MG: 400 CAPSULE ORAL at 21:11

## 2023-07-05 ASSESSMENT — PAIN DESCRIPTION - ORIENTATION: ORIENTATION: ANTERIOR

## 2023-07-05 ASSESSMENT — PAIN DESCRIPTION - LOCATION
LOCATION: ABDOMEN

## 2023-07-05 ASSESSMENT — PAIN SCALES - GENERAL
PAINLEVEL_OUTOF10: 10
PAINLEVEL_OUTOF10: 8
PAINLEVEL_OUTOF10: 10
PAINLEVEL_OUTOF10: 9
PAINLEVEL_OUTOF10: 8
PAINLEVEL_OUTOF10: 7
PAINLEVEL_OUTOF10: 10

## 2023-07-05 ASSESSMENT — PAIN DESCRIPTION - DESCRIPTORS: DESCRIPTORS: ACHING

## 2023-07-06 LAB
ANION GAP SERPL CALC-SCNC: 6 MMOL/L (ref 3–18)
BASOPHILS # BLD: 0 K/UL (ref 0–0.1)
BASOPHILS NFR BLD: 0 % (ref 0–2)
BUN SERPL-MCNC: 92 MG/DL (ref 7–18)
BUN/CREAT SERPL: 37 (ref 12–20)
CALCIUM SERPL-MCNC: 9.3 MG/DL (ref 8.5–10.1)
CHLORIDE SERPL-SCNC: 99 MMOL/L (ref 100–111)
CO2 SERPL-SCNC: 29 MMOL/L (ref 21–32)
CREAT SERPL-MCNC: 2.47 MG/DL (ref 0.6–1.3)
DIFFERENTIAL METHOD BLD: ABNORMAL
EOSINOPHIL # BLD: 0.2 K/UL (ref 0–0.4)
EOSINOPHIL NFR BLD: 2 % (ref 0–5)
ERYTHROCYTE [DISTWIDTH] IN BLOOD BY AUTOMATED COUNT: 17.5 % (ref 11.6–14.5)
GLUCOSE BLD STRIP.AUTO-MCNC: 135 MG/DL (ref 70–110)
GLUCOSE BLD STRIP.AUTO-MCNC: 146 MG/DL (ref 70–110)
GLUCOSE BLD STRIP.AUTO-MCNC: 148 MG/DL (ref 70–110)
GLUCOSE BLD STRIP.AUTO-MCNC: 157 MG/DL (ref 70–110)
GLUCOSE SERPL-MCNC: 145 MG/DL (ref 74–99)
HCT VFR BLD AUTO: 28.6 % (ref 35–45)
HGB BLD-MCNC: 8.6 G/DL (ref 12–16)
IMM GRANULOCYTES # BLD AUTO: 0.4 K/UL (ref 0–0.04)
IMM GRANULOCYTES NFR BLD AUTO: 5 % (ref 0–0.5)
LYMPHOCYTES # BLD: 1.4 K/UL (ref 0.9–3.6)
LYMPHOCYTES NFR BLD: 17 % (ref 21–52)
MCH RBC QN AUTO: 28.1 PG (ref 24–34)
MCHC RBC AUTO-ENTMCNC: 30.1 G/DL (ref 31–37)
MCV RBC AUTO: 93.5 FL (ref 78–100)
MONOCYTES # BLD: 1.1 K/UL (ref 0.05–1.2)
MONOCYTES NFR BLD: 13 % (ref 3–10)
NEUTS SEG # BLD: 5.1 K/UL (ref 1.8–8)
NEUTS SEG NFR BLD: 62 % (ref 40–73)
NRBC # BLD: 0.18 K/UL (ref 0–0.01)
NRBC BLD-RTO: 2.2 PER 100 WBC
PLATELET # BLD AUTO: 406 K/UL (ref 135–420)
PMV BLD AUTO: 9.5 FL (ref 9.2–11.8)
POTASSIUM SERPL-SCNC: 4.1 MMOL/L (ref 3.5–5.5)
RBC # BLD AUTO: 3.06 M/UL (ref 4.2–5.3)
SODIUM SERPL-SCNC: 134 MMOL/L (ref 136–145)
WBC # BLD AUTO: 8.2 K/UL (ref 4.6–13.2)

## 2023-07-06 PROCEDURE — 82962 GLUCOSE BLOOD TEST: CPT

## 2023-07-06 PROCEDURE — 80048 BASIC METABOLIC PNL TOTAL CA: CPT

## 2023-07-06 PROCEDURE — 6370000000 HC RX 637 (ALT 250 FOR IP): Performed by: SURGERY

## 2023-07-06 PROCEDURE — 97116 GAIT TRAINING THERAPY: CPT

## 2023-07-06 PROCEDURE — 1100000000 HC RM PRIVATE

## 2023-07-06 PROCEDURE — 36415 COLL VENOUS BLD VENIPUNCTURE: CPT

## 2023-07-06 PROCEDURE — 85025 COMPLETE CBC W/AUTO DIFF WBC: CPT

## 2023-07-06 PROCEDURE — 97535 SELF CARE MNGMENT TRAINING: CPT

## 2023-07-06 PROCEDURE — 97530 THERAPEUTIC ACTIVITIES: CPT

## 2023-07-06 PROCEDURE — 6370000000 HC RX 637 (ALT 250 FOR IP): Performed by: FAMILY MEDICINE

## 2023-07-06 RX ADMIN — OXYCODONE HYDROCHLORIDE AND ACETAMINOPHEN 1 TABLET: 5; 325 TABLET ORAL at 06:27

## 2023-07-06 RX ADMIN — BUMETANIDE 2 MG: 1 TABLET ORAL at 20:40

## 2023-07-06 RX ADMIN — METOPROLOL TARTRATE 25 MG: 25 TABLET, FILM COATED ORAL at 20:40

## 2023-07-06 RX ADMIN — OXYCODONE HYDROCHLORIDE AND ACETAMINOPHEN 1 TABLET: 5; 325 TABLET ORAL at 12:11

## 2023-07-06 RX ADMIN — ATORVASTATIN CALCIUM 80 MG: 40 TABLET, FILM COATED ORAL at 20:40

## 2023-07-06 RX ADMIN — METOPROLOL TARTRATE 25 MG: 25 TABLET, FILM COATED ORAL at 08:46

## 2023-07-06 RX ADMIN — BUMETANIDE 2 MG: 1 TABLET ORAL at 08:46

## 2023-07-06 RX ADMIN — OXYCODONE HYDROCHLORIDE AND ACETAMINOPHEN 1 TABLET: 5; 325 TABLET ORAL at 20:41

## 2023-07-06 RX ADMIN — GABAPENTIN 400 MG: 400 CAPSULE ORAL at 20:40

## 2023-07-06 ASSESSMENT — PAIN DESCRIPTION - LOCATION
LOCATION: ABDOMEN

## 2023-07-06 ASSESSMENT — PAIN SCALES - GENERAL
PAINLEVEL_OUTOF10: 5
PAINLEVEL_OUTOF10: 10
PAINLEVEL_OUTOF10: 10
PAINLEVEL_OUTOF10: 4
PAINLEVEL_OUTOF10: 4
PAINLEVEL_OUTOF10: 5
PAINLEVEL_OUTOF10: 6
PAINLEVEL_OUTOF10: 3
PAINLEVEL_OUTOF10: 10
PAINLEVEL_OUTOF10: 9

## 2023-07-06 ASSESSMENT — PAIN DESCRIPTION - DESCRIPTORS
DESCRIPTORS: SHARP
DESCRIPTORS: ACHING
DESCRIPTORS: ACHING;THROBBING
DESCRIPTORS: ACHING

## 2023-07-06 ASSESSMENT — PAIN - FUNCTIONAL ASSESSMENT
PAIN_FUNCTIONAL_ASSESSMENT: PREVENTS OR INTERFERES SOME ACTIVE ACTIVITIES AND ADLS
PAIN_FUNCTIONAL_ASSESSMENT: PREVENTS OR INTERFERES SOME ACTIVE ACTIVITIES AND ADLS

## 2023-07-06 ASSESSMENT — PAIN DESCRIPTION - FREQUENCY: FREQUENCY: CONTINUOUS

## 2023-07-06 ASSESSMENT — PAIN DESCRIPTION - ONSET: ONSET: ON-GOING

## 2023-07-06 ASSESSMENT — PAIN DESCRIPTION - PAIN TYPE: TYPE: ACUTE PAIN

## 2023-07-06 ASSESSMENT — PAIN DESCRIPTION - ORIENTATION
ORIENTATION: MID
ORIENTATION: MID
ORIENTATION: ANTERIOR;POSTERIOR

## 2023-07-07 ENCOUNTER — APPOINTMENT (OUTPATIENT)
Facility: HOSPITAL | Age: 73
End: 2023-07-07
Payer: MEDICARE

## 2023-07-07 ENCOUNTER — HOSPITAL ENCOUNTER (EMERGENCY)
Facility: HOSPITAL | Age: 73
Discharge: HOME | End: 2023-07-08
Attending: EMERGENCY MEDICINE
Payer: MEDICARE

## 2023-07-07 VITALS
BODY MASS INDEX: 35 KG/M2 | RESPIRATION RATE: 18 BRPM | WEIGHT: 205 LBS | HEART RATE: 98 BPM | TEMPERATURE: 98.1 F | OXYGEN SATURATION: 94 % | SYSTOLIC BLOOD PRESSURE: 123 MMHG | DIASTOLIC BLOOD PRESSURE: 68 MMHG | HEIGHT: 64 IN

## 2023-07-07 DIAGNOSIS — N17.9 ACUTE KIDNEY INJURY SUPERIMPOSED ON CHRONIC KIDNEY DISEASE (HCC): Primary | ICD-10-CM

## 2023-07-07 DIAGNOSIS — R10.84 GENERALIZED ABDOMINAL PAIN: ICD-10-CM

## 2023-07-07 DIAGNOSIS — N18.9 ACUTE KIDNEY INJURY SUPERIMPOSED ON CHRONIC KIDNEY DISEASE (HCC): Primary | ICD-10-CM

## 2023-07-07 DIAGNOSIS — I50.9 CHRONIC CONGESTIVE HEART FAILURE, UNSPECIFIED HEART FAILURE TYPE (HCC): ICD-10-CM

## 2023-07-07 DIAGNOSIS — R53.1 GENERALIZED WEAKNESS: ICD-10-CM

## 2023-07-07 LAB
ALBUMIN SERPL-MCNC: 3.2 G/DL (ref 3.4–5)
ALBUMIN/GLOB SERPL: 0.8 (ref 0.8–1.7)
ALP SERPL-CCNC: 114 U/L (ref 45–117)
ALT SERPL-CCNC: 15 U/L (ref 13–56)
ANION GAP SERPL CALC-SCNC: 7 MMOL/L (ref 3–18)
APPEARANCE UR: CLEAR
AST SERPL-CCNC: 38 U/L (ref 10–38)
BACTERIA URNS QL MICRO: NEGATIVE /HPF
BASOPHILS # BLD: 0 K/UL (ref 0–0.1)
BASOPHILS NFR BLD: 0 % (ref 0–2)
BILIRUB SERPL-MCNC: 0.7 MG/DL (ref 0.2–1)
BILIRUB UR QL: NEGATIVE
BUN SERPL-MCNC: 99 MG/DL (ref 7–18)
BUN/CREAT SERPL: 29 (ref 12–20)
CALCIUM SERPL-MCNC: 9.8 MG/DL (ref 8.5–10.1)
CHLORIDE SERPL-SCNC: 99 MMOL/L (ref 100–111)
CO2 SERPL-SCNC: 28 MMOL/L (ref 21–32)
COLOR UR: YELLOW
CREAT SERPL-MCNC: 3.37 MG/DL (ref 0.6–1.3)
DIFFERENTIAL METHOD BLD: ABNORMAL
EOSINOPHIL # BLD: 0.2 K/UL (ref 0–0.4)
EOSINOPHIL NFR BLD: 2 % (ref 0–5)
EPITH CASTS URNS QL MICRO: NORMAL /LPF (ref 0–5)
ERYTHROCYTE [DISTWIDTH] IN BLOOD BY AUTOMATED COUNT: 17.7 % (ref 11.6–14.5)
GLOBULIN SER CALC-MCNC: 4.1 G/DL (ref 2–4)
GLUCOSE BLD STRIP.AUTO-MCNC: 160 MG/DL (ref 70–110)
GLUCOSE SERPL-MCNC: 138 MG/DL (ref 74–99)
GLUCOSE UR STRIP.AUTO-MCNC: NEGATIVE MG/DL
HCT VFR BLD AUTO: 30.6 % (ref 35–45)
HGB BLD-MCNC: 9.2 G/DL (ref 12–16)
HGB UR QL STRIP: ABNORMAL
IMM GRANULOCYTES # BLD AUTO: 0 K/UL (ref 0–0.04)
IMM GRANULOCYTES NFR BLD AUTO: 0 % (ref 0–0.5)
KETONES UR QL STRIP.AUTO: NEGATIVE MG/DL
LEUKOCYTE ESTERASE UR QL STRIP.AUTO: ABNORMAL
LYMPHOCYTES # BLD: 3.1 K/UL (ref 0.9–3.6)
LYMPHOCYTES NFR BLD: 28 % (ref 21–52)
MAGNESIUM SERPL-MCNC: 2 MG/DL (ref 1.6–2.6)
MCH RBC QN AUTO: 27.6 PG (ref 24–34)
MCHC RBC AUTO-ENTMCNC: 30.1 G/DL (ref 31–37)
MCV RBC AUTO: 91.9 FL (ref 78–100)
METAMYELOCYTES NFR BLD MANUAL: 3 %
MONOCYTES # BLD: 0.3 K/UL (ref 0.05–1.2)
MONOCYTES NFR BLD: 3 % (ref 3–10)
MYELOCYTES NFR BLD MANUAL: 2 %
NEUTS BAND NFR BLD MANUAL: 1 %
NEUTS SEG # BLD: 6.8 K/UL (ref 1.8–8)
NEUTS SEG NFR BLD: 61 % (ref 40–73)
NITRITE UR QL STRIP.AUTO: NEGATIVE
NRBC # BLD: 0.45 K/UL (ref 0–0.01)
NRBC BLD-RTO: 4.1 PER 100 WBC
NT PRO BNP: 1653 PG/ML (ref 0–900)
PH UR STRIP: 5 (ref 5–8)
PLATELET # BLD AUTO: 429 K/UL (ref 135–420)
PLATELET COMMENT: ABNORMAL
PMV BLD AUTO: 10 FL (ref 9.2–11.8)
POTASSIUM SERPL-SCNC: 5 MMOL/L (ref 3.5–5.5)
PROT SERPL-MCNC: 7.3 G/DL (ref 6.4–8.2)
PROT UR STRIP-MCNC: NEGATIVE MG/DL
RBC # BLD AUTO: 3.33 M/UL (ref 4.2–5.3)
RBC #/AREA URNS HPF: NEGATIVE /HPF (ref 0–5)
RBC MORPH BLD: ABNORMAL
SODIUM SERPL-SCNC: 134 MMOL/L (ref 136–145)
SP GR UR REFRACTOMETRY: 1.01 (ref 1–1.03)
UROBILINOGEN UR QL STRIP.AUTO: 0.2 EU/DL (ref 0.2–1)
WBC # BLD AUTO: 11 K/UL (ref 4.6–13.2)
WBC URNS QL MICRO: NORMAL /HPF (ref 0–4)

## 2023-07-07 PROCEDURE — 71045 X-RAY EXAM CHEST 1 VIEW: CPT

## 2023-07-07 PROCEDURE — 99285 EMERGENCY DEPT VISIT HI MDM: CPT

## 2023-07-07 PROCEDURE — 82962 GLUCOSE BLOOD TEST: CPT

## 2023-07-07 PROCEDURE — 93005 ELECTROCARDIOGRAM TRACING: CPT | Performed by: EMERGENCY MEDICINE

## 2023-07-07 PROCEDURE — 6370000000 HC RX 637 (ALT 250 FOR IP): Performed by: FAMILY MEDICINE

## 2023-07-07 PROCEDURE — 96360 HYDRATION IV INFUSION INIT: CPT

## 2023-07-07 PROCEDURE — 74176 CT ABD & PELVIS W/O CONTRAST: CPT

## 2023-07-07 PROCEDURE — 80053 COMPREHEN METABOLIC PANEL: CPT

## 2023-07-07 PROCEDURE — 2580000003 HC RX 258: Performed by: STUDENT IN AN ORGANIZED HEALTH CARE EDUCATION/TRAINING PROGRAM

## 2023-07-07 PROCEDURE — 85025 COMPLETE CBC W/AUTO DIFF WBC: CPT

## 2023-07-07 PROCEDURE — 83735 ASSAY OF MAGNESIUM: CPT

## 2023-07-07 PROCEDURE — 81001 URINALYSIS AUTO W/SCOPE: CPT

## 2023-07-07 PROCEDURE — 83880 ASSAY OF NATRIURETIC PEPTIDE: CPT

## 2023-07-07 PROCEDURE — 6370000000 HC RX 637 (ALT 250 FOR IP): Performed by: SURGERY

## 2023-07-07 RX ORDER — SODIUM CHLORIDE, SODIUM LACTATE, POTASSIUM CHLORIDE, AND CALCIUM CHLORIDE .6; .31; .03; .02 G/100ML; G/100ML; G/100ML; G/100ML
500 INJECTION, SOLUTION INTRAVENOUS ONCE
Status: COMPLETED | OUTPATIENT
Start: 2023-07-07 | End: 2023-07-07

## 2023-07-07 RX ORDER — OXYCODONE HYDROCHLORIDE AND ACETAMINOPHEN 5; 325 MG/1; MG/1
1 TABLET ORAL EVERY 6 HOURS PRN
Qty: 12 TABLET | Refills: 0 | Status: SHIPPED | OUTPATIENT
Start: 2023-07-07 | End: 2023-07-10

## 2023-07-07 RX ADMIN — OXYCODONE HYDROCHLORIDE AND ACETAMINOPHEN 1 TABLET: 5; 325 TABLET ORAL at 07:54

## 2023-07-07 RX ADMIN — BUMETANIDE 2 MG: 1 TABLET ORAL at 07:54

## 2023-07-07 RX ADMIN — METOPROLOL TARTRATE 25 MG: 25 TABLET, FILM COATED ORAL at 07:54

## 2023-07-07 RX ADMIN — SODIUM CHLORIDE, SODIUM LACTATE, POTASSIUM CHLORIDE, AND CALCIUM CHLORIDE 500 ML: 600; 310; 30; 20 INJECTION, SOLUTION INTRAVENOUS at 21:42

## 2023-07-07 ASSESSMENT — PAIN DESCRIPTION - DESCRIPTORS: DESCRIPTORS: ACHING

## 2023-07-07 ASSESSMENT — PAIN - FUNCTIONAL ASSESSMENT: PAIN_FUNCTIONAL_ASSESSMENT: PREVENTS OR INTERFERES SOME ACTIVE ACTIVITIES AND ADLS

## 2023-07-07 ASSESSMENT — PAIN SCALES - GENERAL
PAINLEVEL_OUTOF10: 10
PAINLEVEL_OUTOF10: 0

## 2023-07-07 ASSESSMENT — PAIN DESCRIPTION - LOCATION: LOCATION: ABDOMEN

## 2023-07-07 ASSESSMENT — PAIN DESCRIPTION - PAIN TYPE: TYPE: ACUTE PAIN

## 2023-07-07 ASSESSMENT — PAIN SCALES - WONG BAKER
WONGBAKER_NUMERICALRESPONSE: 0
WONGBAKER_NUMERICALRESPONSE: 2

## 2023-07-07 ASSESSMENT — PAIN DESCRIPTION - ORIENTATION: ORIENTATION: MID

## 2023-07-07 NOTE — CARE COORDINATION
D/C order noted for today. Orders reviewed. No needs identified at this time. Spokew ith Dr. Leopold Angel who advised carleetent no longer needs home care. Patient is agreeable to dc plan. CM remains available if needed.      Mary Beth Fink, MSW  Care Management

## 2023-07-07 NOTE — PLAN OF CARE
Problem: Chronic Conditions and Co-morbidities  Goal: Patient's chronic conditions and co-morbidity symptoms are monitored and maintained or improved  7/7/2023 1003 by Aditya Law RN  Outcome: Progressing  7/7/2023 0620 by Melchor Leach RN  Outcome: Progressing     Problem: Safety - Adult  Goal: Free from fall injury  7/7/2023 1003 by Aditya Law RN  Outcome: Progressing  7/7/2023 0620 by Melchor Leach RN  Outcome: Progressing     Problem: Pain  Goal: Verbalizes/displays adequate comfort level or baseline comfort level  7/7/2023 1003 by Aditya Law RN  Outcome: Progressing  7/7/2023 0620 by Melchor Leach RN  Outcome: Progressing     Problem: Discharge Planning  Goal: Discharge to home or other facility with appropriate resources  7/7/2023 1003 by Aditya Law RN  Outcome: Progressing  7/7/2023 0620 by Melchor Leach RN  Outcome: Progressing     Problem: Skin/Tissue Integrity  Goal: Absence of new skin breakdown  Description: 1. Monitor for areas of redness and/or skin breakdown  2. Assess vascular access sites hourly  3. Every 4-6 hours minimum:  Change oxygen saturation probe site  4. Every 4-6 hours:  If on nasal continuous positive airway pressure, respiratory therapy assess nares and determine need for appliance change or resting period.   7/7/2023 1003 by Aditya Law RN  Outcome: Progressing  7/7/2023 0620 by Melchor Leach RN  Outcome: Progressing     Problem: Nutrition Deficit:  Goal: Optimize nutritional status  7/7/2023 1003 by Aditya Law RN  Outcome: Progressing  7/7/2023 0620 by Melchor Leach RN  Outcome: Progressing
Problem: Chronic Conditions and Co-morbidities  Goal: Patient's chronic conditions and co-morbidity symptoms are monitored and maintained or improved  Outcome: Progressing     Problem: Safety - Adult  Goal: Free from fall injury  Outcome: Progressing     Problem: Pain  Goal: Verbalizes/displays adequate comfort level or baseline comfort level  Outcome: Progressing     Problem: Discharge Planning  Goal: Discharge to home or other facility with appropriate resources  Outcome: Progressing     Problem: Skin/Tissue Integrity  Goal: Absence of new skin breakdown  Description: 1. Monitor for areas of redness and/or skin breakdown  2. Assess vascular access sites hourly  3. Every 4-6 hours minimum:  Change oxygen saturation probe site  4. Every 4-6 hours:  If on nasal continuous positive airway pressure, respiratory therapy assess nares and determine need for appliance change or resting period.   Outcome: Progressing     Problem: Nutrition Deficit:  Goal: Optimize nutritional status  Outcome: Progressing
Problem: Chronic Conditions and Co-morbidities  Goal: Patient's chronic conditions and co-morbidity symptoms are monitored and maintained or improved  Outcome: Progressing  Flowsheets (Taken 7/2/2023 6428 by Jorge Bucio, RN)  Care Plan - Patient's Chronic Conditions and Co-Morbidity Symptoms are Monitored and Maintained or Improved:   Monitor and assess patient's chronic conditions and comorbid symptoms for stability, deterioration, or improvement   Collaborate with multidisciplinary team to address chronic and comorbid conditions and prevent exacerbation or deterioration     Problem: Safety - Adult  Goal: Free from fall injury  Outcome: Progressing     Problem: Pain  Goal: Verbalizes/displays adequate comfort level or baseline comfort level  Outcome: Progressing  Flowsheets (Taken 7/2/2023 1960 by Jorge Bucio, LISETTE)  Verbalizes/displays adequate comfort level or baseline comfort level:   Encourage patient to monitor pain and request assistance   Assess pain using appropriate pain scale   Administer analgesics based on type and severity of pain and evaluate response     Problem: Discharge Planning  Goal: Discharge to home or other facility with appropriate resources  Outcome: Progressing  Flowsheets (Taken 7/2/2023 9445 by Jorge Bucio, RN)  Discharge to home or other facility with appropriate resources: Identify barriers to discharge with patient and caregiver
Problem: Occupational Therapy - Adult  Goal: By Discharge: Performs self-care activities at highest level of function for planned discharge setting. See evaluation for individualized goals. Description: Occupational Therapy Goals:  Initiated 7/2/2023 to be met within 7-10 days. 1.  Patient will perform grooming with supervision/set-up. 2.  Patient will perform bathing with minimal assistance/contact guard assist.  3.  Patient will perform upper body dressing and lower body dressing  with minimal assistance/contact guard assist.  4.  Patient will perform toilet transfers with minimal assistance/contact guard assist using LRAD with good balance. 5.  Patient will perform all aspects of toileting with minimal assistance/contact guard assist.  6.  Patient will participate in upper extremity therapeutic exercise/activities with supervision/set-up for 8-10 minutes to increase strength/endurance for ADLs. 7.  Patient will utilize energy conservation techniques during functional activities with min verbal cues. PLOF: Pt reports her S.O. assists with ADLs as needed, would not answer if she utilized AD for functional mobility    Outcome: Progressing  OCCUPATIONAL THERAPY TREATMENT    Patient: Sharon Rooney (45 y.o. female)  Date: 7/6/2023  Diagnosis: Colostomy status ([address] ) [Z93.3]  S/P exploratory laparotomy [Z98.890] S/P exploratory laparotomy  Procedure(s) (LRB):  EXPLORATORY LAPAROTOMY; LYSIS OF EXTENSIVE ADHESION, PARTIAL COLECTOMY, COLOSTOMY TAKEDOWN (N/A) 6 Days Post-Op  Precautions: Fall Risk, General Precautions, Contact Precautions    Chart, occupational therapy assessment, plan of care, and goals were reviewed. ASSESSMENT:    Pt required Max encouragement to participate in OT session. Pt demod good sitting balance at EOB, performed grooming tasks and changed gown with VCs for sequencing due to delayed processing of commands.  Pt is educated on the importance of EOB/OOB activities to increase strength
Problem: Physical Therapy - Adult  Goal: By Discharge: Performs mobility at highest level of function for planned discharge setting. See evaluation for individualized goals. Description: Physical Therapy Goals:  Initiated 7/3/2023 to be met within 7-10 days. 1.  Patient will move from supine to sit and sit to supine  in bed with modified independence. 2.  Patient will transfer from bed to chair and chair to bed with modified independence using the least restrictive device. 3.  Patient will perform sit to stand with modified independence. 4.  Patient will ambulate with modified independence for 100 feet with the least restrictive device. 5.  Patient will ascend/descend 4 stairs with handrail(s) with modified independence. PLOF: Independent with mobility without AD. She lives with spouse in single story home with [employer]. She has rollator. 7/6/2023 1252 by Kellen Ledezma, PT  Outcome: Progressing     PHYSICAL THERAPY TREATMENT    Patient: Marie Haley (47 y.o. female)  Date: 7/6/2023  Diagnosis: Colostomy status ([address] [facility]) [Z93.3]  S/P exploratory laparotomy [Z98.890] S/P exploratory laparotomy  Procedure(s) (LRB):  EXPLORATORY LAPAROTOMY; LYSIS OF EXTENSIVE ADHESION, PARTIAL COLECTOMY, COLOSTOMY TAKEDOWN (N/A) 6 Days Post-Op  Precautions: Fall Risk, General Precautions, Contact Precautions    PLOF: see above     ASSESSMENT:  Pt received in bed in NAD and agreeable. Pt required min A to EOB this date, educated in log roll to help with discomfort of staples in abdomen. Pt amb 15 ft to/from bathroom with RW and CGA for safety. Pt with good sitting balance on toilet however fatigued following and could not stand long enough for hand hygiene in sink, thus completed in sitting in recliner. Pt continue to benefit from acute PT and will continue to follow per POC. Left in recliner with all needs met, nursing updated.      Progression toward goals:   [x]      Improving appropriately and progressing toward goals  []
Problem: Physical Therapy - Adult  Goal: By Discharge: Performs mobility at highest level of function for planned discharge setting. See evaluation for individualized goals. Description: Physical Therapy Goals:  Initiated 7/3/2023 to be met within 7-10 days. 1.  Patient will move from supine to sit and sit to supine  in bed with modified independence. 2.  Patient will transfer from bed to chair and chair to bed with modified independence using the least restrictive device. 3.  Patient will perform sit to stand with modified independence. 4.  Patient will ambulate with modified independence for 100 feet with the least restrictive device. 5.  Patient will ascend/descend 4 stairs with handrail(s) with modified independence. PLOF: Independent with mobility without AD. She lives with spouse in single story home with [employer]. She has rollator. Outcome: Progressing     PHYSICAL THERAPY EVALUATION    Patient: Mihir Sorensen (49 y.o. female)  Date: 7/3/2023  Primary Diagnosis: Colostomy status ([address]) [Z93.3]  S/P exploratory laparotomy [Z98.890]  Procedure(s) (LRB):  EXPLORATORY LAPAROTOMY; LYSIS OF EXTENSIVE ADHESION, PARTIAL COLECTOMY, COLOSTOMY TAKEDOWN (N/A) 3 Days Post-Op   Precautions: Fall Risk, General Precautions, Contact Precautions    ASSESSMENT :  Patient received sitting up in bed on 2 L NC and agreeable to PT evaluation following encouragement from Dr. Augusto Urban. She continues to report 10/10 abdominal pain even with pain medication. CGA supine to sit transfer with HOB elevated. CGA sit to stand to RW with cues for safe hand placement. She ambulates 25 ft with short B step length and decreased speed. Patient left resting comfortably in recliner with all needs in reach. She verbalizes understanding to call for nursing assistance with mobility and encouraged for OOB in chair for all meals.      DEFICITS/IMPAIRMENTS:    , Body Structures, Functions, Activity Limitations Requiring Skilled Therapeutic
Problem: Safety - Adult  Goal: Free from fall injury  7/4/2023 2313 by Jamar Thomson RN  Outcome: Progressing  7/4/2023 1052 by Denny Roque RN  Outcome: [address] Progressing     Problem: Pain  Goal: Verbalizes/displays adequate comfort level or baseline comfort level  7/4/2023 2313 by Jamar Thomson RN  Outcome: Progressing  7/4/2023 1052 by Denny Roque RN  Outcome: [address] (Taken 7/3/2023 2130 by Lucille Huang RN)  Verbalizes/displays adequate comfort level or baseline comfort level: Assess pain using appropriate pain scale
assistance  Stand to Sit: Contact-guard assistance    Balance:  Balance  Sitting: Intact  Standing: Impaired  Standing - Static: Good  Standing - Dynamic: Fair    ADL Intervention:     LE Dressing: Stand by assistance; Adaptive equipment     Pain:  Pain level pre-treatment: 9/10   Pain level post-treatment: 9/10  Pain Intervention(s): Rest, Ice, Repositioning   Response to intervention: Nurse notified    Activity Tolerance:    Activity Tolerance: Patient limited by pain  Please refer to the flowsheet for vital signs taken during this treatment. After treatment:   []  Patient left in no apparent distress sitting up in chair  [x]  Patient left in no apparent distress in bed  [x]  Call bell left within reach  [x]  Nursing notified  []  Caregiver present  [x]  Bed alarm activated    COMMUNICATION/EDUCATION:   Patient Education  Education Given To: Patient  Education Provided: Role of Therapy;Plan of Care;ADL Adaptive Strategies; Energy Conservation;Transfer Training; Fall Prevention Strategies  Education Method: Teach Back;Verbal;Demonstration  Barriers to Learning: None  Education Outcome: Continued education needed      Thank you for this referral.  KRISTI Rojas  Minutes: 26

## 2023-07-07 NOTE — DISCHARGE INSTRUCTIONS
Discharge Instructions Following Surgery    Patient: Mihir Sorensen MRN: [id number]  SSN: [id number]    YOB: 1950  Age: 72 y.o. Sex: female      Activity  As tolerated, walking encourage, stairs are okay. Avoid strenuous activities - no lifting anything heavier than 15 pounds till seen in the clinic. You may shower at home    Diet  Soft diet with a stool softener for few more days    Pain  Take pain medication as directed by your doctor. Call your doctor if pain is NOT relieved by medication. Wound and Dressing Care  There is partially open wound on the site of the previous colostomy. It can be covered with a gauze. Call your doctor if  Excessive bleeding that does not stop after holding mild pressure over the area. Temperature of 101 degrees F or above. Redness,excessive swelling or bruising, and/or green or yellow, smelly discharge from incision. If nausea and vomiting continues. Appointment date/time Follow-Up Phone Calls    Call the office at [phone] to make your follow-up appointment in 2 weeks after the surgery (if not already set up) . Dr. Ulises Stuart cell phone number is [phone]. Please call me if you have any concerns or questions.

## 2023-07-08 VITALS
BODY MASS INDEX: 35 KG/M2 | TEMPERATURE: 98.8 F | OXYGEN SATURATION: 98 % | DIASTOLIC BLOOD PRESSURE: 47 MMHG | WEIGHT: 205 LBS | HEART RATE: 98 BPM | HEIGHT: 64 IN | SYSTOLIC BLOOD PRESSURE: 122 MMHG | RESPIRATION RATE: 17 BRPM

## 2023-07-08 LAB
EKG ATRIAL RATE: 106 BPM
EKG ATRIAL RATE: 107 BPM
EKG DIAGNOSIS: NORMAL
EKG DIAGNOSIS: NORMAL
EKG P AXIS: 74 DEGREES
EKG P AXIS: 75 DEGREES
EKG P-R INTERVAL: 166 MS
EKG P-R INTERVAL: 168 MS
EKG Q-T INTERVAL: 354 MS
EKG Q-T INTERVAL: 358 MS
EKG QRS DURATION: 82 MS
EKG QRS DURATION: 84 MS
EKG QTC CALCULATION (BAZETT): 472 MS
EKG QTC CALCULATION (BAZETT): 475 MS
EKG R AXIS: 19 DEGREES
EKG R AXIS: 20 DEGREES
EKG T AXIS: 88 DEGREES
EKG T AXIS: 99 DEGREES
EKG VENTRICULAR RATE: 106 BPM
EKG VENTRICULAR RATE: 107 BPM

## 2023-07-08 PROCEDURE — 93010 ELECTROCARDIOGRAM REPORT: CPT | Performed by: INTERNAL MEDICINE

## 2023-07-08 NOTE — ED TRIAGE NOTES
Pt Dc'd this AM from [facility], following surgical colostomy take-down. Pt currently has abdominal mid-line incision still stapled. Pt currently complaining of 10/10 pain in abd, also complaining of bilat leg swelling and generalized weakness.

## 2023-07-08 NOTE — DISCHARGE INSTRUCTIONS
Bumex (bumetanide) instructions:  Do not take your dose of bumex tomorrow morning (7/8/2023)  Tomorrow evening you may resume taking your bumex, but only take 2mg twice daily instead of 4mg twice daily. Dr. Grupo Giron will schedule you an appointment this week for repeat blood work. Please return to the ER if you have any immediate concerns.

## 2023-07-08 NOTE — ED PROVIDER NOTES
HPI  70-year-old female with past medical history of CHF, CKD, COPD, diabetes. Had colostomy reversal performed 1 week ago and presents to the ER today with abdominal pain and some leg weakness. Patient states that abdominal pain has been present since she was discharged from hospital she has not yet had a bowel movement but is passing gas. She states that her legs feel weak, notes that during her recent admission she did not ambulate very much.     Past Medical History:   Diagnosis Date    Anemia     Chondromalacia of both patellae     Chronic heart failure with preserved ejection fraction ([address]) 01/13/2023    Chronic kidney disease     COPD (chronic obstructive pulmonary disease) ([address]) 09/2015    mild-mod dz; Dr Kaitlin Coffman    Diabetes [facility]) 2013    Dilated cardiomyopathy ([address])     Dyslipidemia     Gout     Heart attack ([address]) 10/18/2019    Stented    Hypercholesteremia 01/08/2014    Hypertension 2000    Obesity     Orthostatic hypotension 05/17/2016    Osteoarthritis of both knees     Popliteal cyst     Sleep apnea     not using cpap    Stage 3b chronic kidney disease ([address]) 08/08/2022    Vitamin D deficiency 10/16/2014        Past Surgical History:   Procedure Laterality Date    CARDIAC CATHETERIZATION  2019    Stents x 2    COLONOSCOPY N/A 06/15/2023    COLONOSCOPY performed by Adilene Ngo MD at [address]  01/18/2023    DIALYSIS FISTULA CREATION Left 03/10/2023    LEFT ARM BRACHIAL ARTERY TO AXILLARY VEIN ARTERIO VENOUS GRAFT WITH ARTEGRAFT performed by Julianna Jansen MD at [address] [place] [address]    IR TUNNELED C/Casia 10 5 YEARS  09/21/2022    IR TUNNELED CATHETER PLACEMENT GREATER THAN 5 YEARS 9/21/2022 [facility] RAD ANGIO IR    IR TUNNELED CATHETER PLACEMENT GREATER THAN 5 YEARS  09/21/2022    NERVE BLOCK Left 03/10/2023    NERVE BLOCK (DEFINE) performed by Julianna Jansen MD at [facility]  01/18/2023    Exploratory laparotomy, sigmoid

## 2023-07-17 ENCOUNTER — OFFICE VISIT (OUTPATIENT)
Age: 73
End: 2023-07-17

## 2023-07-17 VITALS
OXYGEN SATURATION: 98 % | HEART RATE: 78 BPM | SYSTOLIC BLOOD PRESSURE: 124 MMHG | WEIGHT: 201 LBS | DIASTOLIC BLOOD PRESSURE: 63 MMHG | BODY MASS INDEX: 34.31 KG/M2 | HEIGHT: 64 IN

## 2023-07-17 DIAGNOSIS — Z09 POSTOPERATIVE FOLLOW-UP: Primary | ICD-10-CM

## 2023-07-17 PROCEDURE — 99024 POSTOP FOLLOW-UP VISIT: CPT | Performed by: SURGERY

## 2023-07-17 NOTE — PROGRESS NOTES
Patient seen and examined. She is doing great. She did fall at home but now she is better. Stated she hurt her back. She is tolerating a regular diet and having some loose bowel movement. Her abdomen is soft and nontender and her midline wound at the site of the previous colostomy is clean. I removed the staples. Follow-up as needed  .

## 2023-07-17 NOTE — PROGRESS NOTES
Kelvin Cruz is a 72 y.o. female (: 1950) presenting to address:    Chief Complaint   Patient presents with    Post-Op Check     Partial colectomy and colostomy takedown 23       Medication list and allergies have been reviewed with Kelvin Cruz and updated as of today's date. I have gone over all Medical, Surgical and Social History with Kelvin Cruz and updated/added the information accordingly. 1. Have you been to the ER, Urgent Care or Hospitalized since your last visit? Yes. [facility] 23. Leg weakness          2. Have you followed up with your PCP or any other Physicians since your procedure/ last office visit?    No

## 2023-08-03 ENCOUNTER — HOSPITAL ENCOUNTER (INPATIENT)
Facility: HOSPITAL | Age: 73
LOS: 7 days | Discharge: HOME | End: 2023-08-11
Attending: EMERGENCY MEDICINE | Admitting: INTERNAL MEDICINE
Payer: MEDICARE

## 2023-08-03 DIAGNOSIS — I25.10 CAD (CORONARY ARTERY DISEASE): ICD-10-CM

## 2023-08-03 DIAGNOSIS — R91.8 OPACITIES OF BOTH LUNGS PRESENT ON CHEST X-RAY: ICD-10-CM

## 2023-08-03 DIAGNOSIS — I42.0 CARDIOMYOPATHY, DILATED (HCC): ICD-10-CM

## 2023-08-03 DIAGNOSIS — I50.32 CHRONIC HEART FAILURE WITH PRESERVED EJECTION FRACTION (HCC): ICD-10-CM

## 2023-08-03 DIAGNOSIS — M25.512 BILATERAL SHOULDER PAIN, UNSPECIFIED CHRONICITY: ICD-10-CM

## 2023-08-03 DIAGNOSIS — I25.10 CORONARY ARTERY DISEASE: ICD-10-CM

## 2023-08-03 DIAGNOSIS — J96.21 ACUTE ON CHRONIC RESPIRATORY FAILURE WITH HYPOXIA (HCC): ICD-10-CM

## 2023-08-03 DIAGNOSIS — E87.20 LACTIC ACIDOSIS: ICD-10-CM

## 2023-08-03 DIAGNOSIS — R65.10 SIRS (SYSTEMIC INFLAMMATORY RESPONSE SYNDROME) (HCC): ICD-10-CM

## 2023-08-03 DIAGNOSIS — M25.511 BILATERAL SHOULDER PAIN, UNSPECIFIED CHRONICITY: ICD-10-CM

## 2023-08-03 DIAGNOSIS — R06.03 RESPIRATORY DISTRESS: Primary | ICD-10-CM

## 2023-08-03 LAB
BASOPHILS # BLD: 0 K/UL (ref 0–0.1)
BASOPHILS NFR BLD: 0 % (ref 0–2)
DIFFERENTIAL METHOD BLD: ABNORMAL
EOSINOPHIL # BLD: 0.1 K/UL (ref 0–0.4)
EOSINOPHIL NFR BLD: 1 % (ref 0–5)
ERYTHROCYTE [DISTWIDTH] IN BLOOD BY AUTOMATED COUNT: 17.2 % (ref 11.6–14.5)
HCT VFR BLD AUTO: 29.7 % (ref 35–45)
HGB BLD-MCNC: 8.6 G/DL (ref 12–16)
IMM GRANULOCYTES # BLD AUTO: 0.1 K/UL (ref 0–0.04)
IMM GRANULOCYTES NFR BLD AUTO: 1 % (ref 0–0.5)
LYMPHOCYTES # BLD: 1.6 K/UL (ref 0.9–3.6)
LYMPHOCYTES NFR BLD: 27 % (ref 21–52)
MCH RBC QN AUTO: 27.9 PG (ref 24–34)
MCHC RBC AUTO-ENTMCNC: 29 G/DL (ref 31–37)
MCV RBC AUTO: 96.4 FL (ref 78–100)
MONOCYTES # BLD: 0.5 K/UL (ref 0.05–1.2)
MONOCYTES NFR BLD: 9 % (ref 3–10)
NEUTS SEG # BLD: 3.6 K/UL (ref 1.8–8)
NEUTS SEG NFR BLD: 62 % (ref 40–73)
NRBC # BLD: 0.14 K/UL (ref 0–0.01)
NRBC BLD-RTO: 2.4 PER 100 WBC
PLATELET # BLD AUTO: 372 K/UL (ref 135–420)
PMV BLD AUTO: 8.8 FL (ref 9.2–11.8)
RBC # BLD AUTO: 3.08 M/UL (ref 4.2–5.3)
WBC # BLD AUTO: 5.8 K/UL (ref 4.6–13.2)

## 2023-08-03 PROCEDURE — 83880 ASSAY OF NATRIURETIC PEPTIDE: CPT

## 2023-08-03 PROCEDURE — 6360000002 HC RX W HCPCS: Performed by: PHYSICIAN ASSISTANT

## 2023-08-03 PROCEDURE — 96366 THER/PROPH/DIAG IV INF ADDON: CPT

## 2023-08-03 PROCEDURE — 85025 COMPLETE CBC W/AUTO DIFF WBC: CPT

## 2023-08-03 PROCEDURE — 93005 ELECTROCARDIOGRAM TRACING: CPT | Performed by: EMERGENCY MEDICINE

## 2023-08-03 PROCEDURE — 85379 FIBRIN DEGRADATION QUANT: CPT

## 2023-08-03 PROCEDURE — 87040 BLOOD CULTURE FOR BACTERIA: CPT

## 2023-08-03 PROCEDURE — 96375 TX/PRO/DX INJ NEW DRUG ADDON: CPT

## 2023-08-03 PROCEDURE — 84484 ASSAY OF TROPONIN QUANT: CPT

## 2023-08-03 PROCEDURE — 83605 ASSAY OF LACTIC ACID: CPT

## 2023-08-03 PROCEDURE — 6370000000 HC RX 637 (ALT 250 FOR IP): Performed by: EMERGENCY MEDICINE

## 2023-08-03 PROCEDURE — 80053 COMPREHEN METABOLIC PANEL: CPT

## 2023-08-03 PROCEDURE — 99285 EMERGENCY DEPT VISIT HI MDM: CPT

## 2023-08-03 PROCEDURE — 94660 CPAP INITIATION&MGMT: CPT

## 2023-08-03 PROCEDURE — 83735 ASSAY OF MAGNESIUM: CPT

## 2023-08-03 PROCEDURE — 96365 THER/PROPH/DIAG IV INF INIT: CPT

## 2023-08-03 PROCEDURE — 2500000003 HC RX 250 WO HCPCS: Performed by: EMERGENCY MEDICINE

## 2023-08-03 PROCEDURE — 2580000003 HC RX 258: Performed by: PHYSICIAN ASSISTANT

## 2023-08-03 PROCEDURE — 6370000000 HC RX 637 (ALT 250 FOR IP): Performed by: PHYSICIAN ASSISTANT

## 2023-08-03 RX ORDER — NITROGLYCERIN 0.4 MG/1
0.4 TABLET SUBLINGUAL ONCE
Status: COMPLETED | OUTPATIENT
Start: 2023-08-04 | End: 2023-08-03

## 2023-08-03 RX ORDER — IPRATROPIUM BROMIDE AND ALBUTEROL SULFATE 2.5; .5 MG/3ML; MG/3ML
1 SOLUTION RESPIRATORY (INHALATION)
Status: COMPLETED | OUTPATIENT
Start: 2023-08-03 | End: 2023-08-03

## 2023-08-03 RX ORDER — DILTIAZEM HYDROCHLORIDE 5 MG/ML
10 INJECTION INTRAVENOUS ONCE
Status: COMPLETED | OUTPATIENT
Start: 2023-08-04 | End: 2023-08-03

## 2023-08-03 RX ORDER — MAGNESIUM SULFATE IN WATER 40 MG/ML
2000 INJECTION, SOLUTION INTRAVENOUS
Status: COMPLETED | OUTPATIENT
Start: 2023-08-03 | End: 2023-08-04

## 2023-08-03 RX ADMIN — DILTIAZEM HYDROCHLORIDE 10 MG: 5 INJECTION, SOLUTION INTRAVENOUS at 23:38

## 2023-08-03 RX ADMIN — MAGNESIUM SULFATE HEPTAHYDRATE 2000 MG: 40 INJECTION, SOLUTION INTRAVENOUS at 23:46

## 2023-08-03 RX ADMIN — NITROGLYCERIN 0.4 MG: 0.4 TABLET, ORALLY DISINTEGRATING SUBLINGUAL at 23:44

## 2023-08-03 RX ADMIN — IPRATROPIUM BROMIDE AND ALBUTEROL SULFATE 1 DOSE: .5; 3 SOLUTION RESPIRATORY (INHALATION) at 23:36

## 2023-08-03 RX ADMIN — WATER 125 MG: 1 INJECTION INTRAMUSCULAR; INTRAVENOUS; SUBCUTANEOUS at 23:40

## 2023-08-03 ASSESSMENT — LIFESTYLE VARIABLES
HOW MANY STANDARD DRINKS CONTAINING ALCOHOL DO YOU HAVE ON A TYPICAL DAY: PATIENT DOES NOT DRINK
HOW OFTEN DO YOU HAVE A DRINK CONTAINING ALCOHOL: NEVER

## 2023-08-03 ASSESSMENT — PAIN - FUNCTIONAL ASSESSMENT: PAIN_FUNCTIONAL_ASSESSMENT: NONE - DENIES PAIN

## 2023-08-04 ENCOUNTER — APPOINTMENT (OUTPATIENT)
Facility: HOSPITAL | Age: 73
End: 2023-08-04
Payer: MEDICARE

## 2023-08-04 PROBLEM — I50.23 ACUTE ON CHRONIC SYSTOLIC HEART FAILURE (HCC): Status: ACTIVE | Noted: 2019-11-07

## 2023-08-04 PROBLEM — J96.91 RESPIRATORY FAILURE WITH HYPOXIA (HCC): Status: ACTIVE | Noted: 2023-08-04

## 2023-08-04 LAB
ALBUMIN SERPL-MCNC: 3.3 G/DL (ref 3.4–5)
ALBUMIN SERPL-MCNC: 3.5 G/DL (ref 3.4–5)
ALBUMIN/GLOB SERPL: 1 (ref 0.8–1.7)
ALBUMIN/GLOB SERPL: 1.3 (ref 0.8–1.7)
ALP SERPL-CCNC: 146 U/L (ref 45–117)
ALP SERPL-CCNC: 154 U/L (ref 45–117)
ALT SERPL-CCNC: 15 U/L (ref 13–56)
ALT SERPL-CCNC: 15 U/L (ref 13–56)
ANION GAP SERPL CALC-SCNC: 7 MMOL/L (ref 3–18)
ANION GAP SERPL CALC-SCNC: 9 MMOL/L (ref 3–18)
ARTERIAL PATENCY WRIST A: POSITIVE
ARTERIAL PATENCY WRIST A: POSITIVE
AST SERPL-CCNC: 9 U/L (ref 10–38)
AST SERPL-CCNC: 9 U/L (ref 10–38)
BASE DEFICIT BLD-SCNC: 6.1 MMOL/L
BASE DEFICIT BLD-SCNC: 7.5 MMOL/L
BASOPHILS # BLD: 0 K/UL (ref 0–0.1)
BASOPHILS NFR BLD: 0 % (ref 0–2)
BDY SITE: ABNORMAL
BDY SITE: ABNORMAL
BILIRUB SERPL-MCNC: 0.4 MG/DL (ref 0.2–1)
BILIRUB SERPL-MCNC: 0.5 MG/DL (ref 0.2–1)
BUN SERPL-MCNC: 19 MG/DL (ref 7–18)
BUN SERPL-MCNC: 21 MG/DL (ref 7–18)
BUN/CREAT SERPL: 11 (ref 12–20)
BUN/CREAT SERPL: 12 (ref 12–20)
CALCIUM SERPL-MCNC: 9.1 MG/DL (ref 8.5–10.1)
CALCIUM SERPL-MCNC: 9.3 MG/DL (ref 8.5–10.1)
CHLORIDE SERPL-SCNC: 111 MMOL/L (ref 100–111)
CHLORIDE SERPL-SCNC: 113 MMOL/L (ref 100–111)
CO2 SERPL-SCNC: 19 MMOL/L (ref 21–32)
CO2 SERPL-SCNC: 20 MMOL/L (ref 21–32)
CREAT SERPL-MCNC: 1.75 MG/DL (ref 0.6–1.3)
CREAT SERPL-MCNC: 1.82 MG/DL (ref 0.6–1.3)
D DIMER PPP FEU-MCNC: 2.7 UG/ML(FEU)
DIFFERENTIAL METHOD BLD: ABNORMAL
ECHO AO ROOT DIAM: 2.5 CM
ECHO AO ROOT INDEX: 1.25 CM/M2
ECHO BSA: 2.08 M2
ECHO EST RA PRESSURE: 8 MMHG
ECHO LV FRACTIONAL SHORTENING: 22 % (ref 28–44)
ECHO LV INTERNAL DIMENSION DIASTOLE INDEX: 3 CM/M2
ECHO LV INTERNAL DIMENSION DIASTOLIC: 6 CM (ref 3.9–5.3)
ECHO LV INTERNAL DIMENSION SYSTOLIC INDEX: 2.35 CM/M2
ECHO LV INTERNAL DIMENSION SYSTOLIC: 4.7 CM
ECHO LV IVSD: 0.7 CM (ref 0.6–0.9)
ECHO LV MASS 2D: 200.7 G (ref 67–162)
ECHO LV MASS INDEX 2D: 100.4 G/M2 (ref 43–95)
ECHO LV POSTERIOR WALL DIASTOLIC: 1 CM (ref 0.6–0.9)
ECHO LV RELATIVE WALL THICKNESS RATIO: 0.33
ECHO LVOT AREA: 2.3 CM2
ECHO LVOT DIAM: 1.7 CM
ECHO MV REGURGITANT ALIASING (NYQUIST) VELOCITY: 38 CM/S
ECHO MV REGURGITANT VELOCITY PISA: 5.8 M/S
ECHO MV REGURGITANT VTIA: 161 CM
ECHO RIGHT VENTRICULAR SYSTOLIC PRESSURE (RVSP): 8 MMHG
ECHO RV FREE WALL PEAK S': 10 CM/S
ECHO RV TAPSE: 2.6 CM (ref 1.7–?)
ECHO TV REGURGITANT MAX VELOCITY: 0.1 M/S
ECHO TV REGURGITANT PEAK GRADIENT: 0 MMHG
EKG ATRIAL RATE: 114 BPM
EKG DIAGNOSIS: NORMAL
EKG DIAGNOSIS: NORMAL
EKG P AXIS: 77 DEGREES
EKG P-R INTERVAL: 150 MS
EKG Q-T INTERVAL: 326 MS
EKG Q-T INTERVAL: 352 MS
EKG QRS DURATION: 80 MS
EKG QRS DURATION: 82 MS
EKG QTC CALCULATION (BAZETT): 485 MS
EKG QTC CALCULATION (BAZETT): 527 MS
EKG R AXIS: 72 DEGREES
EKG R AXIS: 81 DEGREES
EKG T AXIS: 41 DEGREES
EKG T AXIS: 60 DEGREES
EKG VENTRICULAR RATE: 114 BPM
EKG VENTRICULAR RATE: 157 BPM
EOSINOPHIL # BLD: 0 K/UL (ref 0–0.4)
EOSINOPHIL NFR BLD: 0 % (ref 0–5)
ERYTHROCYTE [DISTWIDTH] IN BLOOD BY AUTOMATED COUNT: 17.1 % (ref 11.6–14.5)
EST. AVERAGE GLUCOSE BLD GHB EST-MCNC: 120 MG/DL
FLUAV RNA SPEC QL NAA+PROBE: NOT DETECTED
FLUBV RNA SPEC QL NAA+PROBE: NOT DETECTED
GAS FLOW.O2 O2 DELIVERY SYS: ABNORMAL
GAS FLOW.O2 O2 DELIVERY SYS: ABNORMAL
GAS FLOW.O2 SETTING OXYMISER: 8 BPM
GAS FLOW.O2 SETTING OXYMISER: 8 BPM
GLOBULIN SER CALC-MCNC: 2.7 G/DL (ref 2–4)
GLOBULIN SER CALC-MCNC: 3.3 G/DL (ref 2–4)
GLUCOSE BLD STRIP.AUTO-MCNC: 194 MG/DL (ref 70–110)
GLUCOSE BLD STRIP.AUTO-MCNC: 200 MG/DL (ref 70–110)
GLUCOSE BLD STRIP.AUTO-MCNC: 213 MG/DL (ref 70–110)
GLUCOSE BLD STRIP.AUTO-MCNC: 252 MG/DL (ref 70–110)
GLUCOSE SERPL-MCNC: 169 MG/DL (ref 74–99)
GLUCOSE SERPL-MCNC: 238 MG/DL (ref 74–99)
HBA1C MFR BLD: 5.8 % (ref 4.2–5.6)
HCO3 BLD-SCNC: 18.3 MMOL/L (ref 22–26)
HCO3 BLD-SCNC: 19.8 MMOL/L (ref 22–26)
HCT VFR BLD AUTO: 29.1 % (ref 35–45)
HGB BLD-MCNC: 8.5 G/DL (ref 12–16)
IMM GRANULOCYTES # BLD AUTO: 0.1 K/UL (ref 0–0.04)
IMM GRANULOCYTES NFR BLD AUTO: 1 % (ref 0–0.5)
IPAP/PIP/HIGH PEEP: 16
IPAP/PIP/HIGH PEEP: 16
LACTATE SERPL-SCNC: 3.9 MMOL/L (ref 0.4–2)
LACTATE SERPL-SCNC: 4.3 MMOL/L (ref 0.4–2)
LYMPHOCYTES # BLD: 0.5 K/UL (ref 0.9–3.6)
LYMPHOCYTES NFR BLD: 6 % (ref 21–52)
MAGNESIUM SERPL-MCNC: 1.7 MG/DL (ref 1.6–2.6)
MAGNESIUM SERPL-MCNC: 2.3 MG/DL (ref 1.6–2.6)
MCH RBC QN AUTO: 28 PG (ref 24–34)
MCHC RBC AUTO-ENTMCNC: 29.2 G/DL (ref 31–37)
MCV RBC AUTO: 95.7 FL (ref 78–100)
MONOCYTES # BLD: 0.1 K/UL (ref 0.05–1.2)
MONOCYTES NFR BLD: 2 % (ref 3–10)
NEUTS SEG # BLD: 7.6 K/UL (ref 1.8–8)
NEUTS SEG NFR BLD: 91 % (ref 40–73)
NRBC # BLD: 0.11 K/UL (ref 0–0.01)
NRBC BLD-RTO: 1.3 PER 100 WBC
NT PRO BNP: 4902 PG/ML (ref 0–900)
O2/TOTAL GAS SETTING VFR VENT: 40 %
O2/TOTAL GAS SETTING VFR VENT: 40 %
PCO2 BLD: 37.3 MMHG (ref 35–45)
PCO2 BLD: 40.2 MMHG (ref 35–45)
PEEP RESPIRATORY: 6 CMH2O
PEEP RESPIRATORY: 6 CMH2O
PH BLD: 7.3 (ref 7.35–7.45)
PH BLD: 7.3 (ref 7.35–7.45)
PLATELET # BLD AUTO: 377 K/UL (ref 135–420)
PMV BLD AUTO: 9.2 FL (ref 9.2–11.8)
PO2 BLD: 47 MMHG (ref 80–100)
PO2 BLD: 77 MMHG (ref 80–100)
POTASSIUM SERPL-SCNC: 4.4 MMOL/L (ref 3.5–5.5)
POTASSIUM SERPL-SCNC: 4.5 MMOL/L (ref 3.5–5.5)
PRESSURE SUPPORT SETTING VENT: 10 CMH2O
PRESSURE SUPPORT SETTING VENT: 10 CMH2O
PROT SERPL-MCNC: 6.2 G/DL (ref 6.4–8.2)
PROT SERPL-MCNC: 6.6 G/DL (ref 6.4–8.2)
RBC # BLD AUTO: 3.04 M/UL (ref 4.2–5.3)
RESPIRATORY RATE, POC: 21 (ref 5–40)
RESPIRATORY RATE, POC: 24 (ref 5–40)
SAO2 % BLD: 78.6 % (ref 92–97)
SAO2 % BLD: 93.7 % (ref 92–97)
SARS-COV-2 RNA RESP QL NAA+PROBE: NOT DETECTED
SERVICE CMNT-IMP: ABNORMAL
SODIUM SERPL-SCNC: 139 MMOL/L (ref 136–145)
SODIUM SERPL-SCNC: 140 MMOL/L (ref 136–145)
SPECIMEN TYPE: ABNORMAL
SPECIMEN TYPE: ABNORMAL
TROPONIN I SERPL HS-MCNC: 52 NG/L (ref 0–54)
TROPONIN I SERPL HS-MCNC: 60 NG/L (ref 0–54)
TROPONIN I SERPL HS-MCNC: 68 NG/L (ref 0–54)
VT SETTING VENT: 550 ML
VT SETTING VENT: 600 ML
WBC # BLD AUTO: 8.3 K/UL (ref 4.6–13.2)

## 2023-08-04 PROCEDURE — APPNB60 APP NON BILLABLE TIME 46-60 MINS: Performed by: PHYSICIAN ASSISTANT

## 2023-08-04 PROCEDURE — 83036 HEMOGLOBIN GLYCOSYLATED A1C: CPT

## 2023-08-04 PROCEDURE — 99223 1ST HOSP IP/OBS HIGH 75: CPT | Performed by: INTERNAL MEDICINE

## 2023-08-04 PROCEDURE — 85025 COMPLETE CBC W/AUTO DIFF WBC: CPT

## 2023-08-04 PROCEDURE — 2500000003 HC RX 250 WO HCPCS: Performed by: INTERNAL MEDICINE

## 2023-08-04 PROCEDURE — 2580000003 HC RX 258: Performed by: EMERGENCY MEDICINE

## 2023-08-04 PROCEDURE — 6370000000 HC RX 637 (ALT 250 FOR IP): Performed by: INTERNAL MEDICINE

## 2023-08-04 PROCEDURE — C9113 INJ PANTOPRAZOLE SODIUM, VIA: HCPCS | Performed by: PHYSICIAN ASSISTANT

## 2023-08-04 PROCEDURE — 71045 X-RAY EXAM CHEST 1 VIEW: CPT

## 2023-08-04 PROCEDURE — 6360000002 HC RX W HCPCS: Performed by: PHYSICIAN ASSISTANT

## 2023-08-04 PROCEDURE — 99232 SBSQ HOSP IP/OBS MODERATE 35: CPT | Performed by: INTERNAL MEDICINE

## 2023-08-04 PROCEDURE — 2500000003 HC RX 250 WO HCPCS: Performed by: EMERGENCY MEDICINE

## 2023-08-04 PROCEDURE — 6360000002 HC RX W HCPCS: Performed by: INTERNAL MEDICINE

## 2023-08-04 PROCEDURE — 36600 WITHDRAWAL OF ARTERIAL BLOOD: CPT

## 2023-08-04 PROCEDURE — 83605 ASSAY OF LACTIC ACID: CPT

## 2023-08-04 PROCEDURE — 93005 ELECTROCARDIOGRAM TRACING: CPT | Performed by: EMERGENCY MEDICINE

## 2023-08-04 PROCEDURE — A4216 STERILE WATER/SALINE, 10 ML: HCPCS | Performed by: PHYSICIAN ASSISTANT

## 2023-08-04 PROCEDURE — 3430000000 HC RX DIAGNOSTIC RADIOPHARMACEUTICAL: Performed by: INTERNAL MEDICINE

## 2023-08-04 PROCEDURE — C8924 2D TTE W OR W/O FOL W/CON,FU: HCPCS

## 2023-08-04 PROCEDURE — A9540 TC99M MAA: HCPCS | Performed by: INTERNAL MEDICINE

## 2023-08-04 PROCEDURE — 87636 SARSCOV2 & INF A&B AMP PRB: CPT

## 2023-08-04 PROCEDURE — 6360000004 HC RX CONTRAST MEDICATION: Performed by: INTERNAL MEDICINE

## 2023-08-04 PROCEDURE — 37799 UNLISTED PX VASCULAR SURGERY: CPT

## 2023-08-04 PROCEDURE — 36415 COLL VENOUS BLD VENIPUNCTURE: CPT

## 2023-08-04 PROCEDURE — 83735 ASSAY OF MAGNESIUM: CPT

## 2023-08-04 PROCEDURE — 80053 COMPREHEN METABOLIC PANEL: CPT

## 2023-08-04 PROCEDURE — 84484 ASSAY OF TROPONIN QUANT: CPT

## 2023-08-04 PROCEDURE — 1100000003 HC PRIVATE W/ TELEMETRY

## 2023-08-04 PROCEDURE — 82803 BLOOD GASES ANY COMBINATION: CPT

## 2023-08-04 PROCEDURE — 6360000002 HC RX W HCPCS: Performed by: EMERGENCY MEDICINE

## 2023-08-04 PROCEDURE — 2580000003 HC RX 258: Performed by: INTERNAL MEDICINE

## 2023-08-04 PROCEDURE — 6370000000 HC RX 637 (ALT 250 FOR IP): Performed by: PHYSICIAN ASSISTANT

## 2023-08-04 PROCEDURE — 6370000000 HC RX 637 (ALT 250 FOR IP): Performed by: EMERGENCY MEDICINE

## 2023-08-04 PROCEDURE — 93010 ELECTROCARDIOGRAM REPORT: CPT | Performed by: INTERNAL MEDICINE

## 2023-08-04 PROCEDURE — 2580000003 HC RX 258: Performed by: PHYSICIAN ASSISTANT

## 2023-08-04 PROCEDURE — 96375 TX/PRO/DX INJ NEW DRUG ADDON: CPT

## 2023-08-04 PROCEDURE — 94640 AIRWAY INHALATION TREATMENT: CPT

## 2023-08-04 PROCEDURE — 2700000000 HC OXYGEN THERAPY PER DAY

## 2023-08-04 PROCEDURE — A9539 TC99M PENTETATE: HCPCS | Performed by: INTERNAL MEDICINE

## 2023-08-04 PROCEDURE — 94761 N-INVAS EAR/PLS OXIMETRY MLT: CPT

## 2023-08-04 PROCEDURE — 82962 GLUCOSE BLOOD TEST: CPT

## 2023-08-04 RX ORDER — ASPIRIN 81 MG/1
162 TABLET, CHEWABLE ORAL DAILY
Status: DISCONTINUED | OUTPATIENT
Start: 2023-08-04 | End: 2023-08-07

## 2023-08-04 RX ORDER — HYDRALAZINE HYDROCHLORIDE 25 MG/1
25 TABLET, FILM COATED ORAL EVERY 8 HOURS SCHEDULED
Status: DISCONTINUED | OUTPATIENT
Start: 2023-08-04 | End: 2023-08-11 | Stop reason: HOSPADM

## 2023-08-04 RX ORDER — DEXTROSE MONOHYDRATE 100 MG/ML
INJECTION, SOLUTION INTRAVENOUS CONTINUOUS PRN
Status: DISCONTINUED | OUTPATIENT
Start: 2023-08-04 | End: 2023-08-05 | Stop reason: SDUPTHER

## 2023-08-04 RX ORDER — INSULIN LISPRO 100 [IU]/ML
0-4 INJECTION, SOLUTION INTRAVENOUS; SUBCUTANEOUS
Status: DISCONTINUED | OUTPATIENT
Start: 2023-08-04 | End: 2023-08-11 | Stop reason: HOSPADM

## 2023-08-04 RX ORDER — AZITHROMYCIN 250 MG/1
500 TABLET, FILM COATED ORAL DAILY
Status: COMPLETED | OUTPATIENT
Start: 2023-08-04 | End: 2023-08-06

## 2023-08-04 RX ORDER — FUROSEMIDE 10 MG/ML
20 INJECTION INTRAMUSCULAR; INTRAVENOUS ONCE
Status: DISCONTINUED | OUTPATIENT
Start: 2023-08-04 | End: 2023-08-04

## 2023-08-04 RX ORDER — NITROGLYCERIN 0.4 MG/1
0.4 TABLET SUBLINGUAL EVERY 5 MIN PRN
Status: DISCONTINUED | OUTPATIENT
Start: 2023-08-04 | End: 2023-08-11 | Stop reason: HOSPADM

## 2023-08-04 RX ORDER — BUDESONIDE 1 MG/2ML
1 INHALANT ORAL 2 TIMES DAILY
Status: DISCONTINUED | OUTPATIENT
Start: 2023-08-04 | End: 2023-08-11 | Stop reason: HOSPADM

## 2023-08-04 RX ORDER — MECOBALAMIN 5000 MCG
5 TABLET,DISINTEGRATING ORAL NIGHTLY
Status: DISCONTINUED | OUTPATIENT
Start: 2023-08-04 | End: 2023-08-11 | Stop reason: HOSPADM

## 2023-08-04 RX ORDER — BISACODYL 5 MG/1
5 TABLET, DELAYED RELEASE ORAL DAILY PRN
Status: DISCONTINUED | OUTPATIENT
Start: 2023-08-04 | End: 2023-08-11 | Stop reason: HOSPADM

## 2023-08-04 RX ORDER — ACETAMINOPHEN 325 MG/1
650 TABLET ORAL EVERY 4 HOURS PRN
Status: DISCONTINUED | OUTPATIENT
Start: 2023-08-04 | End: 2023-08-11 | Stop reason: SDUPTHER

## 2023-08-04 RX ORDER — INSULIN GLARGINE 100 [IU]/ML
7 INJECTION, SOLUTION SUBCUTANEOUS NIGHTLY
Status: DISCONTINUED | OUTPATIENT
Start: 2023-08-04 | End: 2023-08-11 | Stop reason: HOSPADM

## 2023-08-04 RX ORDER — IPRATROPIUM BROMIDE AND ALBUTEROL SULFATE 2.5; .5 MG/3ML; MG/3ML
1 SOLUTION RESPIRATORY (INHALATION)
Status: DISCONTINUED | OUTPATIENT
Start: 2023-08-04 | End: 2023-08-06

## 2023-08-04 RX ORDER — HEPARIN SODIUM 5000 [USP'U]/ML
5000 INJECTION, SOLUTION INTRAVENOUS; SUBCUTANEOUS EVERY 8 HOURS SCHEDULED
Status: DISCONTINUED | OUTPATIENT
Start: 2023-08-04 | End: 2023-08-11 | Stop reason: HOSPADM

## 2023-08-04 RX ORDER — AZITHROMYCIN 250 MG/1
500 TABLET, FILM COATED ORAL DAILY
Status: DISCONTINUED | OUTPATIENT
Start: 2023-08-04 | End: 2023-08-04

## 2023-08-04 RX ORDER — GABAPENTIN 100 MG/1
100 CAPSULE ORAL 2 TIMES DAILY
Status: DISCONTINUED | OUTPATIENT
Start: 2023-08-04 | End: 2023-08-09

## 2023-08-04 RX ORDER — DILTIAZEM HYDROCHLORIDE 5 MG/ML
5 INJECTION INTRAVENOUS EVERY 6 HOURS PRN
Status: DISCONTINUED | OUTPATIENT
Start: 2023-08-04 | End: 2023-08-05

## 2023-08-04 RX ORDER — KIT FOR THE PREPARATION OF TECHNETIUM TC 99M PENTETATE 20 MG/1
31.5 INJECTION, POWDER, LYOPHILIZED, FOR SOLUTION INTRAVENOUS; RESPIRATORY (INHALATION)
Status: COMPLETED | OUTPATIENT
Start: 2023-08-04 | End: 2023-08-04

## 2023-08-04 RX ORDER — IPRATROPIUM BROMIDE AND ALBUTEROL SULFATE 2.5; .5 MG/3ML; MG/3ML
1 SOLUTION RESPIRATORY (INHALATION)
Status: DISCONTINUED | OUTPATIENT
Start: 2023-08-04 | End: 2023-08-04

## 2023-08-04 RX ORDER — BUMETANIDE 0.25 MG/ML
1 INJECTION INTRAMUSCULAR; INTRAVENOUS
Status: COMPLETED | OUTPATIENT
Start: 2023-08-04 | End: 2023-08-04

## 2023-08-04 RX ORDER — INSULIN LISPRO 100 [IU]/ML
0-4 INJECTION, SOLUTION INTRAVENOUS; SUBCUTANEOUS NIGHTLY
Status: DISCONTINUED | OUTPATIENT
Start: 2023-08-04 | End: 2023-08-11 | Stop reason: HOSPADM

## 2023-08-04 RX ORDER — METOPROLOL SUCCINATE 25 MG/1
25 TABLET, EXTENDED RELEASE ORAL DAILY
Status: DISCONTINUED | OUTPATIENT
Start: 2023-08-05 | End: 2023-08-11 | Stop reason: HOSPADM

## 2023-08-04 RX ORDER — ARFORMOTEROL TARTRATE 15 UG/2ML
15 SOLUTION RESPIRATORY (INHALATION)
Status: DISCONTINUED | OUTPATIENT
Start: 2023-08-04 | End: 2023-08-11 | Stop reason: HOSPADM

## 2023-08-04 RX ORDER — ONDANSETRON 2 MG/ML
4 INJECTION INTRAMUSCULAR; INTRAVENOUS EVERY 6 HOURS PRN
Status: DISCONTINUED | OUTPATIENT
Start: 2023-08-04 | End: 2023-08-11 | Stop reason: HOSPADM

## 2023-08-04 RX ORDER — DEXTROSE MONOHYDRATE 100 MG/ML
INJECTION, SOLUTION INTRAVENOUS CONTINUOUS PRN
Status: DISCONTINUED | OUTPATIENT
Start: 2023-08-04 | End: 2023-08-11 | Stop reason: HOSPADM

## 2023-08-04 RX ORDER — BUMETANIDE 0.25 MG/ML
1 INJECTION INTRAMUSCULAR; INTRAVENOUS 2 TIMES DAILY
Status: DISCONTINUED | OUTPATIENT
Start: 2023-08-04 | End: 2023-08-11 | Stop reason: HOSPADM

## 2023-08-04 RX ADMIN — HYDRALAZINE HYDROCHLORIDE 25 MG: 25 TABLET, FILM COATED ORAL at 21:15

## 2023-08-04 RX ADMIN — CEFTRIAXONE 1000 MG: 1 INJECTION, POWDER, FOR SOLUTION INTRAMUSCULAR; INTRAVENOUS at 01:34

## 2023-08-04 RX ADMIN — DILTIAZEM HYDROCHLORIDE 30 MG: 30 TABLET, FILM COATED ORAL at 05:52

## 2023-08-04 RX ADMIN — KIT FOR THE PREPARATION OF TECHNETIUM TC 99M PENTETATE 31.5 MILLICURIE: 20 INJECTION, POWDER, LYOPHILIZED, FOR SOLUTION INTRAVENOUS; RESPIRATORY (INHALATION) at 09:29

## 2023-08-04 RX ADMIN — BUDESONIDE 1 MG: 1 SUSPENSION RESPIRATORY (INHALATION) at 19:39

## 2023-08-04 RX ADMIN — WATER 60 MG: 1 INJECTION INTRAMUSCULAR; INTRAVENOUS; SUBCUTANEOUS at 08:18

## 2023-08-04 RX ADMIN — WATER 60 MG: 1 INJECTION INTRAMUSCULAR; INTRAVENOUS; SUBCUTANEOUS at 17:59

## 2023-08-04 RX ADMIN — GABAPENTIN 100 MG: 100 CAPSULE ORAL at 08:13

## 2023-08-04 RX ADMIN — IPRATROPIUM BROMIDE AND ALBUTEROL SULFATE 1 DOSE: .5; 3 SOLUTION RESPIRATORY (INHALATION) at 19:39

## 2023-08-04 RX ADMIN — ARFORMOTEROL TARTRATE 15 MCG: 15 SOLUTION RESPIRATORY (INHALATION) at 19:39

## 2023-08-04 RX ADMIN — BUMETANIDE 1 MG: 0.25 INJECTION INTRAMUSCULAR; INTRAVENOUS at 08:13

## 2023-08-04 RX ADMIN — ARFORMOTEROL TARTRATE 15 MCG: 15 SOLUTION RESPIRATORY (INHALATION) at 08:13

## 2023-08-04 RX ADMIN — GABAPENTIN 100 MG: 100 CAPSULE ORAL at 21:14

## 2023-08-04 RX ADMIN — KIT FOR THE PREPARATION OF TECHNETIUM TC 99M ALBUMIN AGGREGATED 5.5 MILLICURIE: 2.5 INJECTION, POWDER, FOR SOLUTION INTRAVENOUS at 09:30

## 2023-08-04 RX ADMIN — INSULIN GLARGINE 7 UNITS: 100 INJECTION, SOLUTION SUBCUTANEOUS at 21:16

## 2023-08-04 RX ADMIN — PERFLUTREN 2 ML: 6.52 INJECTION, SUSPENSION INTRAVENOUS at 09:29

## 2023-08-04 RX ADMIN — WATER 60 MG: 1 INJECTION INTRAMUSCULAR; INTRAVENOUS; SUBCUTANEOUS at 23:58

## 2023-08-04 RX ADMIN — BUMETANIDE 1 MG: 0.25 INJECTION INTRAMUSCULAR; INTRAVENOUS at 21:13

## 2023-08-04 RX ADMIN — ONDANSETRON 4 MG: 2 INJECTION INTRAMUSCULAR; INTRAVENOUS at 23:58

## 2023-08-04 RX ADMIN — IPRATROPIUM BROMIDE AND ALBUTEROL SULFATE 1 DOSE: .5; 3 SOLUTION RESPIRATORY (INHALATION) at 15:15

## 2023-08-04 RX ADMIN — BUMETANIDE 1 MG: 0.25 INJECTION INTRAMUSCULAR; INTRAVENOUS at 01:34

## 2023-08-04 RX ADMIN — IPRATROPIUM BROMIDE AND ALBUTEROL SULFATE 1 DOSE: .5; 3 SOLUTION RESPIRATORY (INHALATION) at 03:13

## 2023-08-04 RX ADMIN — INSULIN LISPRO 2 UNITS: 100 INJECTION, SOLUTION INTRAVENOUS; SUBCUTANEOUS at 12:34

## 2023-08-04 RX ADMIN — HYDRALAZINE HYDROCHLORIDE 25 MG: 25 TABLET, FILM COATED ORAL at 15:02

## 2023-08-04 RX ADMIN — ASPIRIN 81 MG 162 MG: 81 TABLET ORAL at 08:13

## 2023-08-04 RX ADMIN — SODIUM CHLORIDE 40 MG: 9 INJECTION INTRAMUSCULAR; INTRAVENOUS; SUBCUTANEOUS at 21:35

## 2023-08-04 RX ADMIN — AZITHROMYCIN MONOHYDRATE 500 MG: 250 TABLET ORAL at 01:36

## 2023-08-04 RX ADMIN — INSULIN LISPRO 1 UNITS: 100 INJECTION, SOLUTION INTRAVENOUS; SUBCUTANEOUS at 16:49

## 2023-08-04 RX ADMIN — Medication 5 MG: at 21:14

## 2023-08-04 ASSESSMENT — PAIN SCALES - GENERAL
PAINLEVEL_OUTOF10: 0
PAINLEVEL_OUTOF10: 0

## 2023-08-04 ASSESSMENT — PAIN SCALES - WONG BAKER: WONGBAKER_NUMERICALRESPONSE: 0

## 2023-08-04 NOTE — PROGRESS NOTES
Pharmacy Note - Therapeutic Interchange    Ipratropium 0.02% (0.5mg/2.5 ml) nebulization solution + Brovana (arformoterol) 15 mcg/2 mL nebulization solution was therapeutically interchanged for Tiotropium/olodaterol (Stiolto) Inhaler per the P&T Committee approved Therapeutic Interchanges Policy. Approved dose conversion listed in table below.     Medication Ordered Preferred Medication Dispensed   Glycopyrrolate/indacaterol (Utibron)   Ipratropium scheduled q8h   +  Brovana (arformoterol) 15 mcg/2 mL1 neb BID   Tiotropium/olodaterol (Stiolto)    Umeclidinium/vilanterol (Anoro)    Glycopyyrolate/formoterol (Bevespi)        DOMINICK Iverson [facility], Pharmacist  8/4/2023 2:44 AM

## 2023-08-04 NOTE — ED NOTES
Pt resting on stretcher no complaints ot concerns at this time. Pt repeat labs obtained and walked to lab. Urine specimen obtained via purewik draining to LWS. Pt remains on NC 4L 95% tolerating well. Pt awaiting bed placement.  Cardiac monitoring in place call bell in reach      Elo Willard RN  08/04/23 7168

## 2023-08-04 NOTE — CONSULTS
[facility] - Consult Note    Cardiology consultation request from Dr. Lolly Nova for evaluation and management/treatment of a/c CHF    Date of  Admission: 8/3/2023 11:13 PM   Primary Care Physician:  Codi Sims MD    Attending Cardiologist: Dr. Laura Garcia:     -Acute hypoxic respiratory failure, multifactorial in setting of below. -COPD exacerbation. -a/c HFpEF exacerbation. Dyspnea, elevated proBNP, CXR with pulmonary edema.   -Elevated troponin, likely type 2 in setting of above. -CAD, s/p cardiac cath (10/2019, Dr. Gerhardt Childs) with findings as follows:  LM: Angiographically normal  LAD: Previously placed ostial to proximal LAD stent is widely patent. D1 with ostial 50% stenosis. LCx: Minimal luminal irregularities. RCA: Minimal luminal irregularities.  -Hx Cardiomyopathy with recovered LVEF. EF as low as 35% in 2018, most recent EF 50-55% 06/2022.   -CKD  -Anemia  -HTN, on bumex 2 mg daily,   -HLD, on statin. -DM  -Hx tobacco abuse   -Obesity. Primary cardiologist is Dr. Lee Noland:     -Continue IV bumex 1 mg BID, suspect she will require a few days of IV diuresis. Scr is improving from admission, continue to monitor. Nephrology following.   -Trend cardiac bio markers. Continue ASA, statin. No need for heparin gtt at this time. -LTD echo to measure LVEF, last Echo > 1 yr ago. -GDMT, Will d/c Cardizem, start Toprol XL. Will start hydralazine for now, if Scr allows prior to discharge, plan to start ARNI.   -Further recommendations pending hospital course, test results. History of Present Illness: This is a 72 y.o. female admitted for Respiratory failure with hypoxia ([address]) [J96.91]. Patient complains of: PRABHA John is a 72 y.o. female, pmhx as stated above, who we are seeing for CHF and elevated troponins. Patient reports SOB x 1 day prior to admission. She has also noticed her legs are more swollen, specifically the right more than the left.  She states Mild interstitial edema.       Signed By: Johana Nieto PA-C     August 4, 2023

## 2023-08-04 NOTE — ED PROVIDER NOTES
EMERGENCY DEPARTMENT HISTORY AND PHYSICAL EXAM      Date: 8/3/2023  Patient Name: Sharon Rooney      History of Presenting Illness     Chief Complaint   Patient presents with    Shortness of Breath     Pt to ED via EMS reports SOB onset today pt was labored per EMS ,able to speak in complete sentences. hx COPD, Chf, DM home O2 PRN. Pt received 2 neb tx enroute . Pt Anxious. Location/Duration/Severity/Modifying factors   Chief Complaint   Patient presents with    Shortness of Breath     Pt to ED via EMS reports SOB onset today pt was labored per EMS ,able to speak in complete sentences. hx COPD, Chf, DM home O2 PRN. Pt received 2 neb tx enroute . Pt Anxious. HPI:  Sharon Rooney is a 72 y.o. female with PMH significant for CHF, COPD presents with constant shortness of breath, worsening with exertion that started earlier today gradually worsened. Notes central chest congestion but no pain or pressure. Has noticed swelling to bilateral legs, right greater than left. Concern for fluid in her lungs. Has been using her Flovent throughout the day with no relief. Came in by EMS after receiving 2 duo nebulizer treatments. PCP: Lucia Gomes MD    Current Facility-Administered Medications   Medication Dose Route Frequency Provider Last Rate Last Admin    cefTRIAXone (ROCEPHIN) 1,000 mg in sterile water 10 mL IV syringe  1,000 mg IntraVENous Q24H Jonetta Counts, DO   1,000 mg at 08/04/23 0134    azithromycin (ZITHROMAX) tablet 500 mg  500 mg Oral Daily Jonetta Counts, DO   500 mg at 08/04/23 0136     Current Outpatient Medications   Medication Sig Dispense Refill    albuterol sulfate HFA (PROVENTIL;VENTOLIN;PROAIR) 108 (90 Base) MCG/ACT inhaler Inhale 2 puffs into the lungs every 6 hours as needed      aspirin 81 MG chewable tablet Take 2 tablets by mouth daily      gabapentin (NEURONTIN) 400 MG capsule Take 1 capsule by mouth daily.       ipratropium-albuterol (DUONEB) 0.5-2.5 (3) MG/3ML SOLN

## 2023-08-04 NOTE — ED NOTES
Pt resting NAD, VS assessed pt remains tachycardic on the monitor pt on NC 4L 98%, pt denies pain  tolerating well. Alayna Balderasbaldomero remains in place draining to [facility], pt has not voided at this time. Pt aware of admission awaiting COVID results. Pt voiced no issues or concerns at this time.       Zafar House RN  08/04/23 [id number]

## 2023-08-04 NOTE — ED NOTES
Pt denies pain reports SOB, tight chest Pt placed on bipap appears less anxious      Elizabeth Zuñiga RN  08/03/23 7318

## 2023-08-04 NOTE — H&P
History and Physical    Patient: Mihir Sorensen               Sex: female          DOA: 8/3/2023       YOB: 1950      Age:  72 y.o.        LOS:  LOS: 0 days        Rere Thao MD    Chief complaint: shortness of breath since yesterday    HPI:     Mihir Sorensen is a 72 y.o. female who came to emergency room with complaint of gradually worsening chronic shortness of breath. Patient has history of CHF and COPD and has been on as needed oxygen. Patient also noticed bilateral leg swelling, right greater than left. She stated her shortness of breath started 2 days ago. She has nebulizer machine but did not work. Also feels congested chest.  Denies drinking too much fluid. She did not run out any medications. She states she sees Dr. Jeronimo Love as a cardiologist and Dr. Berkley Ashford is her pulmonologist.  Initially, she required BiPAP in the emergency room followed by nasal cannula oxygen. She received treatment with IV diuretic, IV Solu-Medrol, IV antibiotic and DuoNebs. She was also found out to have lactic acidosis in the emergency room. Her COVID and influenza swab was negative. She started feeling better. No headaches or dizziness. She lives with her . Denies smoking cigarettes or drinking any alcohol.     Past Medical History:   Diagnosis Date    Anemia     Chondromalacia of both patellae     Chronic heart failure with preserved ejection fraction ([facility]) 01/13/2023    Chronic kidney disease     COPD (chronic obstructive pulmonary disease) ([facility]) 09/2015    mild-mod dz; Dr Joanne Harris    Diabetes [facility]) 2013    Dilated cardiomyopathy ([facility])     Dyslipidemia     Gout     Heart attack ([facility]) 10/18/2019    Stented    Hypercholesteremia 01/08/2014    Hypertension 2000    Obesity     Orthostatic hypotension 05/17/2016    Osteoarthritis of both knees     Popliteal cyst     Sleep apnea     not using cpap    Stage 3b chronic kidney disease ([facility]) 08/08/2022    Vitamin D deficiency 10/16/2014

## 2023-08-04 NOTE — ED NOTES
Allergies verified pt medicated per MAR morning labs obtained and walked to lab, cardiac monitoring remains in plac\e. Pt tolerating 4L NC 98% , SOB with exertion.  Purewick in place draining LWS 400ml      Helder Willson RN  08/04/23 6517

## 2023-08-04 NOTE — ED NOTES
Pt awaiting bed placement, resting on stretcher no issues or concerns      Alejandra Guerrero RN  08/04/23 2670

## 2023-08-04 NOTE — PROGRESS NOTES
0700: Bedside and Verbal shift change report given to Wu Patiño RN (oncoming nurse) by Lopez Cano RN (offgoing nurse).  Report included the following information Nurse Handoff Report, Intake/Output, MAR, and Cardiac Rhythm ST .

## 2023-08-04 NOTE — ED NOTES
Pt allergies verified pt medicated per MAR tolerating Po intake w/o incident.  Will repeat troponin and lactic acid, COVID swab obtained specimen sent to lab      Kylee Schaefer RN  08/04/23 9215

## 2023-08-04 NOTE — ACP (ADVANCE CARE PLANNING)
Advance Care Planning     Advance Care Planning Inpatient Note  [facility] Care Department    Today's Date: 8/4/2023  Unit: [facility] [address]    Received request from . Upon review of chart and communication with care team, patient's decision making abilities are not in question. . Patient was/were present in the room during visit. Goals of ACP Conversation:  Discuss advance care planning documents    Health Care Decision Makers:       Primary Decision Maker: Katherine Vazquez - [phone]  Summary:  Verified [facility] [address]    Advance Care Planning Documents (Patient Wishes):  Healthcare Power of /Advance Directive Appointment of Health Care Agent  Living Will/Advance Directive     Assessment:  Patient seen as anew admit to the hospital with respiratory failure with hypoxia. There is an advance directive on file here. Patient is awake and alert/    Interventions:      Care Preferences Communicated:   No    Outcomes/Plan:  Existing advance directive reviewed with patient; no changes to patient's previously recorded wishes.     Electronically signed by Kerri Jacobs, [facility] on 8/4/2023 at 10:44 AM

## 2023-08-04 NOTE — PROGRESS NOTES
6534: Telephone transfer in report given to Lopez Cano RN (oncoming nurse) by Luis Day RN (ED nurse). Report included the following information Nurse Handoff Report, ED SBAR, Intake/Output, MAR, and Cardiac Rhythm ST . Tele called and pt assigned to bedside monitor. Pt currently in [address] med per ED RN. Awaiting pts arrival to the unit. 1000: 4 Eyes Skin Assessment     NAME:  Rodrigo Arechiga  YOB: 1950  MEDICAL RECORD NUMBER:  [id number]    The patient is being assessed for  Admission    I agree that at least one RN has performed a thorough Head to Toe Skin Assessment on the patient. ALL assessment sites listed below have been assessed. Areas assessed by both nurses:    Head, Face, Ears, Shoulders, Back, Chest, Arms, Elbows, Hands, Sacrum. Buttock, Coccyx, Ischium, Legs. Feet and Heels, and Under Medical Devices         Does the Patient have a Wound? No noted wound(s)       Pt has scattered dark moles. Old scars present. Missing nail on both great toes.     Nurse 1 eSignature: Electronically signed by Flavia Hinton RN on 8/4/23 at 6:59 PM EDT      Nurse 2 Nneka Wiggins RN

## 2023-08-04 NOTE — CARE COORDINATION
08/04/23 1202   Readmission Assessment   Number of Days since last admission? 8-30 days   Previous Disposition Home with Family   Who is being Interviewed Patient   What was the patient's/caregiver's perception as to why they think they needed to return back to the hospital? Other (Comment)  (pt states she was having trouble cathing her breath)   Did you visit your Primary Care Physician after you left the hospital, before you returned this time? No   Why weren't you able to visit your PCP? Other (Comment)  (pt states her PCP is not at her clinic anymore and that she needed to pick a new MD at her clinic. When she did have a f/u, she didn't feel well and needed to reschedule.)   Did you see a specialist, such as Cardiac, Pulmonary, Orthopedic Physician, etc. after you left the hospital? Yes  (Dr. Cameron Garber her nephrologist)   Who advised the patient to return to the hospital? Self-referral   Does the patient report anything that got in the way of taking their medications?  No     Elena Garay RN CDCES  Case Management

## 2023-08-04 NOTE — CONSULTS
Consult Note  Consult requested by:   Kelvin Cruz is a 72 y.o. female Black / Joe Moses who is being seen on consult for renal failure  Chief Complaint   Patient presents with    Shortness of Breath     Pt to ED via EMS reports SOB onset today pt was labored per EMS ,able to speak in complete sentences. hx COPD, Chf, DM home O2 PRN. Pt received 2 neb tx enroute . Pt Anxious. Admission diagnosis: Lactic acidosis [E87.20]  Respiratory distress [R06.03]  SIRS (systemic inflammatory response syndrome) (HCC) [R65.10]  Respiratory failure with hypoxia (HCC) [J96.91]  Acute on chronic respiratory failure with hypoxia (HCC) [J96.21]  Opacities of both lungs present on chest x-ray [R91.8]      HPI: 72 y o  [place] female with hx of crf ,used to be on dialysis ,copd,admitted with sob,peripheral edema,treated with iv antibiotics,bumex,solumedrol,nebulizers. feels better since admission  Past Medical History:   Diagnosis Date    Anemia     Chondromalacia of both patellae     Chronic heart failure with preserved ejection fraction ([address]) 01/13/2023    Chronic kidney disease     COPD (chronic obstructive pulmonary disease) ([address]) 09/2015    mild-mod dz; Dr Dane Jaramillo    Diabetes [facility]) 2013    Dilated cardiomyopathy ([address])     Dyslipidemia     Gout     Heart attack ([address]) 10/18/2019    Stented    Hypercholesteremia 01/08/2014    Hypertension 2000    Obesity     Orthostatic hypotension 05/17/2016    Osteoarthritis of both knees     Popliteal cyst     Sleep apnea     not using cpap    Stage 3b chronic kidney disease ([address]) 08/08/2022    Vitamin D deficiency 10/16/2014      Past Surgical History:   Procedure Laterality Date    CARDIAC CATHETERIZATION  2019    Stents x 2    COLONOSCOPY N/A 06/15/2023    COLONOSCOPY performed by Destini Thao MD at [address]  01/18/2023    DIALYSIS FISTULA CREATION Left 03/10/2023    LEFT ARM BRACHIAL ARTERY TO AXILLARY VEIN ARTERIO VENOUS GRAFT WITH ARTEGRAFT mg IntraVENous Q6H PRN    arformoterol tartrate (BROVANA) nebulizer solution 15 mcg  15 mcg Nebulization BID RT    methylPREDNISolone sodium (PF) (SOLU-MEDROL PF) injection 60 mg  60 mg IntraVENous Q8H    [START ON 8/5/2023] metoprolol succinate (TOPROL XL) extended release tablet 25 mg  25 mg Oral Daily       Review of Systems:   Pertinent items are noted in HPI. Data Review:    Labs: Results:       Chemistry Recent Labs     08/03/23 2330 08/04/23  0602    139   K 4.4 4.5   * 111   CO2 20* 19*   BUNCRER 12 11*   CREATININE 1.82* 1.75*   CALCIUM 9.1 9.3   BILITOT 0.4 0.5   PROT 6.2* 6.6   LABALBU 3.5 3.3*   GLOB 2.7 3.3      PTH  Lab Results   Component Value Date/Time    .2 12/10/2022 04:27 AM    PHOS 3.6 01/23/2023 02:47 AM      CBC w/Diff Recent Labs     08/03/23 2330 08/04/23  0602   WBC 5.8 8.3   RBC 3.08* 3.04*   HGB 8.6* 8.5*   HCT 29.7* 29.1*    377      Coagulation No results for input(s): INR, APTT in the last 72 hours. Invalid input(s): PTP    Iron/Ferritin No results for input(s): IRON in the last 72 hours. Invalid input(s): TIBCCALC   BNP Invalid input(s): BNPP   Cardiac Enzymes Lab Results   Component Value Date    CKTOTAL 71 12/16/2021    CKMB 2.0 12/16/2021    CKMBINDEX 2.8 12/16/2021    TROPONINI <0.02 03/19/2021       Liver Enzymes Lab Results   Component Value Date/Time    ALKPHOS 146 08/04/2023 06:02 AM    ALKPHOS 120 01/18/2023 12:11 PM    ALT 15 08/04/2023 06:02 AM    AST 9 08/04/2023 06:02 AM    PROT 6.6 08/04/2023 06:02 AM    BILITOT 0.5 08/04/2023 06:02 AM    LABALBU 3.3 08/04/2023 06:02 AM        Thyroid Studies Lab Results   Component Value Date/Time    TSH 1.29 10/08/2022 05:15 PM        Urinalysis No results for input(s): Liban Yen, SPECGRAV, PHUR, PROTEINU, GLUCOSEU, KETUA, BILIRUBINUR, Azra Ganja in the last 72 hours. Invalid input(s): FLOODU      IMAGES:        NM LUNG VENT/PERFUSION (VQ)    Result Date: 8/4/2023  1.   Low

## 2023-08-04 NOTE — CARE COORDINATION
Case Management Assessment  Initial Evaluation    Date/Time of Evaluation: 8/4/2023 12:01 PM  Assessment Completed by: Dejan Shankar RN    If patient is discharged prior to next notation, then this note serves as note for discharge by case management. Patient Name: Josh Beltrán                   YOB: 1950  Diagnosis: Lactic acidosis [E87.20]  Respiratory distress [R06.03]  SIRS (systemic inflammatory response syndrome) (HCC) [R65.10]  Respiratory failure with hypoxia (HCC) [J96.91]  Acute on chronic respiratory failure with hypoxia (HCC) [J96.21]  Opacities of both lungs present on chest x-ray [R91.8]                   Date / Time: 8/3/2023 11:13 PM    Patient Admission Status: Inpatient   Readmission Risk (Low < 19, Mod (19-27), High > 27): Readmission Risk Score: 20.2    Current PCP: Noemi Odonnell MD  PCP verified by CM? (P) Yes (states her dr. left and she needs to pick a new MD at [facility])    Chart Reviewed: Yes      History Provided by: (P) Patient  Patient Orientation: (P) Alert and Oriented    Patient Cognition: (P) Alert    Hospitalization in the last 30 days (Readmission):  Yes    If yes, Readmission Assessment in  Navigator will be completed.     Advance Directives:      Code Status: Full Code   Patient's Primary Decision Maker is:      Primary Decision Maker: RowanLance - Spouse - [phone]    Discharge Planning:    Patient lives with: (P) Spouse/Significant Other Type of Home: (P) House  Primary Care Giver: (P) Self  Patient Support Systems include: (P) Spouse/Significant Other   Current Financial resources: (P) Medicare, Medicaid  Current community resources:    Current services prior to admission: (P) Oxygen Therapy, C-pap (pt has home oxygen from [employer] @ 2L NC)            Current DME: (P) Cpap, Walker, Oxygen Therapy (Comment)            Type of Home Care services:  (P) None    ADLS  Prior functional level: (P) Independent in ADLs/IADLs  Current the quality data associated with the providers was provided to:     Patient Representative Name:       The Patient and/or Patient Representative Agree with the Discharge Plan? Sher Sanabria RN  Case Management Department  Ph: [phone]       08/04/23 1159   Service Assessment   Patient Orientation Alert and Oriented   Cognition Alert   History Provided By Patient   Primary Caregiver Self   Support Systems Spouse/Significant Other   PCP Verified by CM Yes  (states her  left and she needs to pick a new MD at [facility])   Last Visit to PCP Within last 3 months   Prior Functional Level Independent in ADLs/IADLs   Current Functional Level Independent in ADLs/IADLs   Can patient return to prior living arrangement Yes   Ability to make needs known: Good   Family able to assist with home care needs: Yes   Would you like for me to discuss the discharge plan with any other family members/significant others, and if so, who?  Yes   Financial Resources Medicare;Medicaid   Social/Functional History   Lives With Spouse   Type of [address] [place] [address]  One level   Home Access Stairs to enter with rails   Entrance Stairs - Number of Steps 4   Entrance Stairs - Rails Both   Bathroom Shower/Tub Tub/Shower unit   Bathroom Toilet Bedside commode   Bathroom Equipment Shower chair   [id number] [facility] Help From Family   ADL Assistance Independent   Homemaking Assistance Independent   Ambulation Assistance Independent   Transfer Assistance Independent   Active  No   Patient's  Info  transports   Mode of Transportation Car   Occupation Retired   Type of Occupation worked at [employer] Prior To Admission Oxygen Therapy;C-pap  (pt has home oxygen from [employer] @ 2L NC)   Current DME Prior to Discourse

## 2023-08-04 NOTE — ED TRIAGE NOTES
Pt to ED via EMS reports SOB onset today pt was labored per EMS ,able to speak in complete sentences. hx COPD, Chf, DM home O2 PRN. Pt received 2 neb tx enroute . Pt Anxious.

## 2023-08-04 NOTE — ED NOTES
Pt allergies verified pt medicated per MAR , Bipap in place, cardiac monitoring in place call bell in reach      Cathy Verdin RN  08/03/23 7856

## 2023-08-04 NOTE — ED NOTES
Assumed care pt in rm 3. Pt here for SOB, currently on 4 L NC, pt very winded with the slightness movement. Echo at bedside to see pt. Pt on full cardiac monitor. Awaiting floor bed.      Spencer Tsai RN  08/04/23 6057

## 2023-08-05 ENCOUNTER — APPOINTMENT (OUTPATIENT)
Facility: HOSPITAL | Age: 73
End: 2023-08-05
Payer: MEDICARE

## 2023-08-05 PROBLEM — I50.41 ACUTE HEART FAILURE WITH REDUCED EJECTION FRACTION AND DIASTOLIC DYSFUNCTION (HCC): Status: ACTIVE | Noted: 2023-08-05

## 2023-08-05 PROBLEM — I21.4 NSTEMI (NON-ST ELEVATED MYOCARDIAL INFARCTION) (HCC): Status: ACTIVE | Noted: 2018-05-20

## 2023-08-05 LAB
ALBUMIN SERPL-MCNC: 3.3 G/DL (ref 3.4–5)
ALBUMIN/GLOB SERPL: 1.1 (ref 0.8–1.7)
ALP SERPL-CCNC: 136 U/L (ref 45–117)
ALT SERPL-CCNC: 14 U/L (ref 13–56)
ANION GAP SERPL CALC-SCNC: 7 MMOL/L (ref 3–18)
AST SERPL-CCNC: 7 U/L (ref 10–38)
BASOPHILS # BLD: 0 K/UL (ref 0–0.1)
BASOPHILS NFR BLD: 0 % (ref 0–2)
BILIRUB SERPL-MCNC: 0.4 MG/DL (ref 0.2–1)
BUN SERPL-MCNC: 27 MG/DL (ref 7–18)
BUN/CREAT SERPL: 13 (ref 12–20)
CALCIUM SERPL-MCNC: 9.3 MG/DL (ref 8.5–10.1)
CHLORIDE SERPL-SCNC: 108 MMOL/L (ref 100–111)
CO2 SERPL-SCNC: 21 MMOL/L (ref 21–32)
CREAT SERPL-MCNC: 2.04 MG/DL (ref 0.6–1.3)
DIFFERENTIAL METHOD BLD: ABNORMAL
EOSINOPHIL # BLD: 0 K/UL (ref 0–0.4)
EOSINOPHIL NFR BLD: 0 % (ref 0–5)
ERYTHROCYTE [DISTWIDTH] IN BLOOD BY AUTOMATED COUNT: 17.1 % (ref 11.6–14.5)
GLOBULIN SER CALC-MCNC: 3 G/DL (ref 2–4)
GLUCOSE BLD STRIP.AUTO-MCNC: 182 MG/DL (ref 70–110)
GLUCOSE BLD STRIP.AUTO-MCNC: 184 MG/DL (ref 70–110)
GLUCOSE BLD STRIP.AUTO-MCNC: 218 MG/DL (ref 70–110)
GLUCOSE BLD STRIP.AUTO-MCNC: 220 MG/DL (ref 70–110)
GLUCOSE SERPL-MCNC: 186 MG/DL (ref 74–99)
HCT VFR BLD AUTO: 27.8 % (ref 35–45)
HGB BLD-MCNC: 8.1 G/DL (ref 12–16)
IMM GRANULOCYTES # BLD AUTO: 0.1 K/UL (ref 0–0.04)
IMM GRANULOCYTES NFR BLD AUTO: 1 % (ref 0–0.5)
LACTATE SERPL-SCNC: 0.7 MMOL/L (ref 0.4–2)
LIPASE SERPL-CCNC: 65 U/L (ref 73–393)
LYMPHOCYTES # BLD: 0.5 K/UL (ref 0.9–3.6)
LYMPHOCYTES NFR BLD: 6 % (ref 21–52)
MAGNESIUM SERPL-MCNC: 2.2 MG/DL (ref 1.6–2.6)
MCH RBC QN AUTO: 27.5 PG (ref 24–34)
MCHC RBC AUTO-ENTMCNC: 29.1 G/DL (ref 31–37)
MCV RBC AUTO: 94.2 FL (ref 78–100)
MONOCYTES # BLD: 0.3 K/UL (ref 0.05–1.2)
MONOCYTES NFR BLD: 4 % (ref 3–10)
NEUTS SEG # BLD: 8.6 K/UL (ref 1.8–8)
NEUTS SEG NFR BLD: 90 % (ref 40–73)
NRBC # BLD: 0.32 K/UL (ref 0–0.01)
NRBC BLD-RTO: 3.4 PER 100 WBC
PLATELET # BLD AUTO: 409 K/UL (ref 135–420)
PMV BLD AUTO: 9.5 FL (ref 9.2–11.8)
POTASSIUM SERPL-SCNC: 5.2 MMOL/L (ref 3.5–5.5)
PROT SERPL-MCNC: 6.3 G/DL (ref 6.4–8.2)
RBC # BLD AUTO: 2.95 M/UL (ref 4.2–5.3)
SODIUM SERPL-SCNC: 136 MMOL/L (ref 136–145)
WBC # BLD AUTO: 9.5 K/UL (ref 4.6–13.2)

## 2023-08-05 PROCEDURE — 6370000000 HC RX 637 (ALT 250 FOR IP): Performed by: PHYSICIAN ASSISTANT

## 2023-08-05 PROCEDURE — 99233 SBSQ HOSP IP/OBS HIGH 50: CPT | Performed by: PHYSICIAN ASSISTANT

## 2023-08-05 PROCEDURE — 2700000000 HC OXYGEN THERAPY PER DAY

## 2023-08-05 PROCEDURE — 83690 ASSAY OF LIPASE: CPT

## 2023-08-05 PROCEDURE — 1100000003 HC PRIVATE W/ TELEMETRY

## 2023-08-05 PROCEDURE — 2500000003 HC RX 250 WO HCPCS: Performed by: INTERNAL MEDICINE

## 2023-08-05 PROCEDURE — 82962 GLUCOSE BLOOD TEST: CPT

## 2023-08-05 PROCEDURE — 6370000000 HC RX 637 (ALT 250 FOR IP): Performed by: INTERNAL MEDICINE

## 2023-08-05 PROCEDURE — 83605 ASSAY OF LACTIC ACID: CPT

## 2023-08-05 PROCEDURE — C9113 INJ PANTOPRAZOLE SODIUM, VIA: HCPCS | Performed by: PHYSICIAN ASSISTANT

## 2023-08-05 PROCEDURE — 94640 AIRWAY INHALATION TREATMENT: CPT

## 2023-08-05 PROCEDURE — 83735 ASSAY OF MAGNESIUM: CPT

## 2023-08-05 PROCEDURE — 36415 COLL VENOUS BLD VENIPUNCTURE: CPT

## 2023-08-05 PROCEDURE — 94761 N-INVAS EAR/PLS OXIMETRY MLT: CPT

## 2023-08-05 PROCEDURE — 85025 COMPLETE CBC W/AUTO DIFF WBC: CPT

## 2023-08-05 PROCEDURE — 2580000003 HC RX 258: Performed by: PHYSICIAN ASSISTANT

## 2023-08-05 PROCEDURE — 80053 COMPREHEN METABOLIC PANEL: CPT

## 2023-08-05 PROCEDURE — A4216 STERILE WATER/SALINE, 10 ML: HCPCS | Performed by: PHYSICIAN ASSISTANT

## 2023-08-05 PROCEDURE — 74176 CT ABD & PELVIS W/O CONTRAST: CPT

## 2023-08-05 PROCEDURE — 2580000003 HC RX 258: Performed by: INTERNAL MEDICINE

## 2023-08-05 PROCEDURE — 6360000002 HC RX W HCPCS: Performed by: PHYSICIAN ASSISTANT

## 2023-08-05 PROCEDURE — 99222 1ST HOSP IP/OBS MODERATE 55: CPT | Performed by: INTERNAL MEDICINE

## 2023-08-05 PROCEDURE — 6360000002 HC RX W HCPCS: Performed by: EMERGENCY MEDICINE

## 2023-08-05 PROCEDURE — 6360000002 HC RX W HCPCS: Performed by: INTERNAL MEDICINE

## 2023-08-05 PROCEDURE — 6370000000 HC RX 637 (ALT 250 FOR IP): Performed by: EMERGENCY MEDICINE

## 2023-08-05 RX ORDER — GUAIFENESIN 600 MG/1
600 TABLET, EXTENDED RELEASE ORAL 2 TIMES DAILY
Status: DISCONTINUED | OUTPATIENT
Start: 2023-08-05 | End: 2023-08-11 | Stop reason: HOSPADM

## 2023-08-05 RX ORDER — NALOXONE HYDROCHLORIDE 0.4 MG/ML
0.4 INJECTION, SOLUTION INTRAMUSCULAR; INTRAVENOUS; SUBCUTANEOUS PRN
Status: DISCONTINUED | OUTPATIENT
Start: 2023-08-05 | End: 2023-08-11 | Stop reason: HOSPADM

## 2023-08-05 RX ADMIN — METOPROLOL SUCCINATE 25 MG: 25 TABLET, EXTENDED RELEASE ORAL at 09:07

## 2023-08-05 RX ADMIN — WATER 60 MG: 1 INJECTION INTRAMUSCULAR; INTRAVENOUS; SUBCUTANEOUS at 16:50

## 2023-08-05 RX ADMIN — HYDROMORPHONE HYDROCHLORIDE 0.5 MG: 1 INJECTION, SOLUTION INTRAMUSCULAR; INTRAVENOUS; SUBCUTANEOUS at 21:37

## 2023-08-05 RX ADMIN — HYDRALAZINE HYDROCHLORIDE 25 MG: 25 TABLET, FILM COATED ORAL at 06:20

## 2023-08-05 RX ADMIN — GABAPENTIN 100 MG: 100 CAPSULE ORAL at 09:07

## 2023-08-05 RX ADMIN — HYDRALAZINE HYDROCHLORIDE 25 MG: 25 TABLET, FILM COATED ORAL at 13:25

## 2023-08-05 RX ADMIN — BUMETANIDE 1 MG: 0.25 INJECTION INTRAMUSCULAR; INTRAVENOUS at 09:07

## 2023-08-05 RX ADMIN — ARFORMOTEROL TARTRATE 15 MCG: 15 SOLUTION RESPIRATORY (INHALATION) at 19:03

## 2023-08-05 RX ADMIN — AZITHROMYCIN MONOHYDRATE 500 MG: 250 TABLET ORAL at 09:11

## 2023-08-05 RX ADMIN — INSULIN LISPRO 1 UNITS: 100 INJECTION, SOLUTION INTRAVENOUS; SUBCUTANEOUS at 16:50

## 2023-08-05 RX ADMIN — WATER 60 MG: 1 INJECTION INTRAMUSCULAR; INTRAVENOUS; SUBCUTANEOUS at 09:07

## 2023-08-05 RX ADMIN — Medication 5 MG: at 21:37

## 2023-08-05 RX ADMIN — HYDROMORPHONE HYDROCHLORIDE 0.5 MG: 1 INJECTION, SOLUTION INTRAMUSCULAR; INTRAVENOUS; SUBCUTANEOUS at 13:32

## 2023-08-05 RX ADMIN — INSULIN GLARGINE 7 UNITS: 100 INJECTION, SOLUTION SUBCUTANEOUS at 21:38

## 2023-08-05 RX ADMIN — BUMETANIDE 1 MG: 0.25 INJECTION INTRAMUSCULAR; INTRAVENOUS at 21:38

## 2023-08-05 RX ADMIN — IPRATROPIUM BROMIDE AND ALBUTEROL SULFATE 1 DOSE: .5; 3 SOLUTION RESPIRATORY (INHALATION) at 19:03

## 2023-08-05 RX ADMIN — IPRATROPIUM BROMIDE AND ALBUTEROL SULFATE 1 DOSE: .5; 3 SOLUTION RESPIRATORY (INHALATION) at 07:16

## 2023-08-05 RX ADMIN — GABAPENTIN 100 MG: 100 CAPSULE ORAL at 21:37

## 2023-08-05 RX ADMIN — EPOETIN ALFA-EPBX 5000 UNITS: 10000 INJECTION, SOLUTION INTRAVENOUS; SUBCUTANEOUS at 16:04

## 2023-08-05 RX ADMIN — HEPARIN SODIUM 5000 UNITS: 5000 INJECTION INTRAVENOUS; SUBCUTANEOUS at 21:38

## 2023-08-05 RX ADMIN — IPRATROPIUM BROMIDE AND ALBUTEROL SULFATE 1 DOSE: .5; 3 SOLUTION RESPIRATORY (INHALATION) at 15:20

## 2023-08-05 RX ADMIN — IPRATROPIUM BROMIDE AND ALBUTEROL SULFATE 1 DOSE: .5; 3 SOLUTION RESPIRATORY (INHALATION) at 11:47

## 2023-08-05 RX ADMIN — BUDESONIDE 1 MG: 1 SUSPENSION RESPIRATORY (INHALATION) at 19:03

## 2023-08-05 RX ADMIN — SODIUM CHLORIDE 40 MG: 9 INJECTION INTRAMUSCULAR; INTRAVENOUS; SUBCUTANEOUS at 17:46

## 2023-08-05 RX ADMIN — IPRATROPIUM BROMIDE AND ALBUTEROL SULFATE 1 DOSE: .5; 3 SOLUTION RESPIRATORY (INHALATION) at 04:30

## 2023-08-05 RX ADMIN — ARFORMOTEROL TARTRATE 15 MCG: 15 SOLUTION RESPIRATORY (INHALATION) at 07:16

## 2023-08-05 RX ADMIN — BUDESONIDE 1 MG: 1 SUSPENSION RESPIRATORY (INHALATION) at 07:17

## 2023-08-05 RX ADMIN — GUAIFENESIN 600 MG: 600 TABLET, EXTENDED RELEASE ORAL at 21:37

## 2023-08-05 RX ADMIN — ASPIRIN 81 MG 162 MG: 81 TABLET ORAL at 09:07

## 2023-08-05 RX ADMIN — EPOETIN ALFA-EPBX 5000 UNITS: 10000 INJECTION, SOLUTION INTRAVENOUS; SUBCUTANEOUS at 21:38

## 2023-08-05 RX ADMIN — HYDROMORPHONE HYDROCHLORIDE 0.5 MG: 1 INJECTION, SOLUTION INTRAMUSCULAR; INTRAVENOUS; SUBCUTANEOUS at 02:36

## 2023-08-05 ASSESSMENT — PAIN SCALES - GENERAL
PAINLEVEL_OUTOF10: 7
PAINLEVEL_OUTOF10: 10
PAINLEVEL_OUTOF10: 10
PAINLEVEL_OUTOF10: 0

## 2023-08-05 ASSESSMENT — PAIN DESCRIPTION - DESCRIPTORS
DESCRIPTORS: STABBING
DESCRIPTORS: ACHING;DISCOMFORT
DESCRIPTORS: ACHING;DISCOMFORT

## 2023-08-05 ASSESSMENT — PAIN DESCRIPTION - ONSET: ONSET: SUDDEN

## 2023-08-05 ASSESSMENT — PAIN DESCRIPTION - ORIENTATION
ORIENTATION: ANTERIOR
ORIENTATION: LEFT;OUTER
ORIENTATION: LEFT;OUTER

## 2023-08-05 ASSESSMENT — PAIN DESCRIPTION - LOCATION
LOCATION: ABDOMEN
LOCATION: ABDOMEN
LOCATION: OTHER (COMMENT)
LOCATION: ABDOMEN

## 2023-08-05 ASSESSMENT — PAIN DESCRIPTION - PAIN TYPE: TYPE: ACUTE PAIN

## 2023-08-05 ASSESSMENT — PAIN DESCRIPTION - FREQUENCY: FREQUENCY: CONTINUOUS

## 2023-08-05 ASSESSMENT — PAIN SCALES - WONG BAKER: WONGBAKER_NUMERICALRESPONSE: 0

## 2023-08-05 NOTE — PROGRESS NOTES
Returned from CT scan. Patient was brought to room 362, which she was moved from 368. Supervisor aware.

## 2023-08-05 NOTE — PROGRESS NOTES
RENAL DAILY PROGRESS NOTE    Patient: Darleen Gonzalez               Sex: female          DOA: 8/3/2023 11:13 PM        YOB: 1950      Age:  72 y.o.        LOS:  LOS: 1 day     Subjective:     Darleen Gonzalez is a 72 y.o.  who presents with Lactic acidosis [E87.20]  Respiratory distress [R06.03]  SIRS (systemic inflammatory response syndrome) (HCC) [R65.10]  Respiratory failure with hypoxia (HCC) [J96.91]  Acute on chronic respiratory failure with hypoxia (HCC) [J96.21]  Opacities of both lungs present on chest x-ray [R91.8]. Asked to evaluate fot crf,was on dialysis fpr few montha. admitted with sob,peripheral edema  Chief complains: Patient denies nausea, vomiting, chest pain, dizziness, shortness of breath or headache.  - Reviewed last 24 hrs events     Current Facility-Administered Medications   Medication Dose Route Frequency    HYDROmorphone (DILAUDID) injection 0.5 mg  0.5 mg IntraVENous Q4H PRN    naloxone (NARCAN) injection 0.4 mg  0.4 mg IntraVENous PRN    azithromycin (ZITHROMAX) tablet 500 mg  500 mg Oral Daily    bumetanide (BUMEX) injection 1 mg  1 mg IntraVENous BID    nitroGLYCERIN (NITROSTAT) SL tablet 0.4 mg  0.4 mg SubLINGual Q5 Min PRN    insulin lispro (HUMALOG) injection vial 0-4 Units  0-4 Units SubCUTAneous TID WC    insulin lispro (HUMALOG) injection vial 0-4 Units  0-4 Units SubCUTAneous Nightly    acetaminophen (TYLENOL) tablet 650 mg  650 mg Oral Q4H PRN    ondansetron (ZOFRAN) injection 4 mg  4 mg IntraVENous Q6H PRN    bisacodyl (DULCOLAX) EC tablet 5 mg  5 mg Oral Daily PRN    aspirin chewable tablet 162 mg  162 mg Oral Daily    gabapentin (NEURONTIN) capsule 100 mg  100 mg Oral BID    dilTIAZem injection 5 mg  5 mg IntraVENous Q6H PRN    arformoterol tartrate (BROVANA) nebulizer solution 15 mcg  15 mcg Nebulization BID RT    metoprolol succinate (TOPROL XL) extended release tablet 25 mg  25 mg Oral Daily    hydrALAZINE (APRESOLINE) tablet 25 mg  25 mg

## 2023-08-05 NOTE — PROGRESS NOTES
0700: Bedside and Verbal shift change report given to Kena Peterson RN (oncoming nurse) by LISETTE Quinn (offgoing nurse). Report included the following information Nurse Handoff Report, Intake/Output, MAR, and Cardiac Rhythm ST .      1000: Pt rounded on, no complaints at this time. 1332: Pt given PRN dilaudid 0.5mg at this time for abdominal pain. 1430: Pt rounded on, pt denies pain at this time. 1900: Bedside and Verbal shift change report given to Lizz Hanley RN (oncoming nurse) by Makayla Fernandez RN (offgoing nurse).  Report included the following information Nurse Handoff Report, Intake/Output, MAR, and Cardiac Rhythm ST .

## 2023-08-05 NOTE — PROGRESS NOTES
edema  Neuro: No focal deficits, motor/sensory intact  Skin: Normal color, intact  Psych: appropriate affect, alert and oriented to person, place and time    Current Facility-Administered Medications   Medication Dose Route Frequency    HYDROmorphone (DILAUDID) injection 0.5 mg  0.5 mg IntraVENous Q4H PRN    naloxone (NARCAN) injection 0.4 mg  0.4 mg IntraVENous PRN    epoetin agnes-epbx (RETACRIT) injection 5,000 Units  5,000 Units SubCUTAneous Once per day on Tue Thu Sat    guaiFENesin [facility]) extended release tablet 600 mg  600 mg Oral BID    azithromycin (ZITHROMAX) tablet 500 mg  500 mg Oral Daily    bumetanide (BUMEX) injection 1 mg  1 mg IntraVENous BID    nitroGLYCERIN (NITROSTAT) SL tablet 0.4 mg  0.4 mg SubLINGual Q5 Min PRN    insulin lispro (HUMALOG) injection vial 0-4 Units  0-4 Units SubCUTAneous TID WC    insulin lispro (HUMALOG) injection vial 0-4 Units  0-4 Units SubCUTAneous Nightly    acetaminophen (TYLENOL) tablet 650 mg  650 mg Oral Q4H PRN    ondansetron (ZOFRAN) injection 4 mg  4 mg IntraVENous Q6H PRN    bisacodyl (DULCOLAX) EC tablet 5 mg  5 mg Oral Daily PRN    aspirin chewable tablet 162 mg  162 mg Oral Daily    gabapentin (NEURONTIN) capsule 100 mg  100 mg Oral BID    arformoterol tartrate (BROVANA) nebulizer solution 15 mcg  15 mcg Nebulization BID RT    metoprolol succinate (TOPROL XL) extended release tablet 25 mg  25 mg Oral Daily    hydrALAZINE (APRESOLINE) tablet 25 mg  25 mg Oral 3 times per day    ipratropium 0.5 mg-albuterol 2.5 mg (DUONEB) nebulizer solution 1 Dose  1 Dose Inhalation Q4H RT    glucose chewable tablet 16 g  4 tablet Oral PRN    dextrose bolus 10% 125 mL  125 mL IntraVENous PRN    Or    dextrose bolus 10% 250 mL  250 mL IntraVENous PRN    glucagon (rDNA) injection 1 mg  1 mg SubCUTAneous PRN    dextrose 10 % infusion   IntraVENous Continuous PRN    heparin (porcine) injection 5,000 Units  5,000 Units SubCUTAneous 3 times per day    insulin glargine (LANTUS) cardiogenic pulmonary edema. Transthoracic echocardiogram (TTE) limited with contrast, bubble, strain, and 3D PRN    Result Date: 8/4/2023    Contrast used: Definity. Study Details: Technically difficult study with poor endocardial visualization, procedure performed with the patient in a sitting position. Left Ventricle: Moderately reduced left ventricular systolic function with a visually estimated EF of 35 - 40%. Left ventricle is dilated. Mild posterior thickening. Global hypokinesis present. Right Ventricle: Right ventricle size is normal.   Mitral Valve: Moderate regurgitation. Tricuspid Valve: Unable to assess RVSP due to inadequate or insignificant tricuspid regurgitation. Pulmonary Arteries: Pulmonary hypertension not present. IVC/SVC: IVC diameter is less than or equal to 21 mm and decreases less than 50% during inspiration; therefore the estimated right atrial pressure is intermediate (~8 mmHg).  IVC size is normal.       Time spent reviewing records, independently interpreting results, obtaining history from patient or caregiver, performing physical exam, ordering tests and medications, communicating with specialists, documenting in the chart, and coordinating overall care is 60 minutes    Signed By: Chelsea Fischer PA-C  [address] [place] [address]  Office:  [phone]         August 5, 2023 4:13 PM

## 2023-08-05 NOTE — PLAN OF CARE
Problem: Discharge Planning  Goal: Discharge to home or other facility with appropriate resources  Outcome: Progressing  Flowsheets (Taken 8/5/2023 0800)  Discharge to home or other facility with appropriate resources: Identify barriers to discharge with patient and caregiver     Problem: Safety - Adult  Goal: Free from fall injury  Outcome: Progressing     Problem: Pain  Goal: Verbalizes/displays adequate comfort level or baseline comfort level  Outcome: Progressing  Flowsheets  Taken 8/5/2023 0616 by Ronel Proctor RN  Verbalizes/displays adequate comfort level or baseline comfort level: Assess pain using appropriate pain scale  Taken 8/5/2023 0530 by Ronel Proctor RN  Verbalizes/displays adequate comfort level or baseline comfort level: Assess pain using appropriate pain scale     Problem: Chronic Conditions and Co-morbidities  Goal: Patient's chronic conditions and co-morbidity symptoms are monitored and maintained or improved  Outcome: Progressing  Flowsheets (Taken 8/5/2023 0800)  Care Plan - Patient's Chronic Conditions and Co-Morbidity Symptoms are Monitored and Maintained or Improved: Monitor and assess patient's chronic conditions and comorbid symptoms for stability, deterioration, or improvement

## 2023-08-05 NOTE — PROGRESS NOTES
RN called stating that patient complaining of severe abdominal pain. 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 Pain is around that area. Keep NPO. Check CT abdomen pelvis. Check lactate. Check CBC.   Discussed with

## 2023-08-05 NOTE — PROGRESS NOTES
I came and assessed the patient. Patient is sitting on the side of the bed. Patient complains of some abdominal pain. Patient states that pain started in the early afternoon of August 4. Patient states that pain is all over the anterior of the abdomen. Patient has some dry heaving. Patient states she had a BM yesterday. Patient denies any trouble urinating. On my examination abdomen is soft, there is vague tenderness to palpation all over the anterior of the abdomen. No guarding or rigidity is noted. Check CT scan. Check labs. Check lactate. Judicious pain control. I discussed with patient. I discussed with RN.

## 2023-08-05 NOTE — PROGRESS NOTES
Patient went downstairs to radiology for CT scan of abdomen. O2 was brought and portable tele. She handled procedure well.

## 2023-08-06 PROBLEM — N18.32 ACUTE RENAL FAILURE SUPERIMPOSED ON STAGE 3B CHRONIC KIDNEY DISEASE (HCC): Status: ACTIVE | Noted: 2023-08-06

## 2023-08-06 PROBLEM — J44.9 MODERATE COPD (CHRONIC OBSTRUCTIVE PULMONARY DISEASE) (HCC): Status: ACTIVE | Noted: 2023-08-06

## 2023-08-06 PROBLEM — N17.0 ACUTE RENAL FAILURE WITH ACUTE TUBULAR NECROSIS SUPERIMPOSED ON STAGE 5 CHRONIC KIDNEY DISEASE, NOT ON CHRONIC DIALYSIS (HCC): Status: ACTIVE | Noted: 2023-08-06

## 2023-08-06 PROBLEM — N18.5 ACUTE RENAL FAILURE WITH ACUTE TUBULAR NECROSIS SUPERIMPOSED ON STAGE 5 CHRONIC KIDNEY DISEASE, NOT ON CHRONIC DIALYSIS (HCC): Status: ACTIVE | Noted: 2023-08-06

## 2023-08-06 PROBLEM — G47.33 OSA (OBSTRUCTIVE SLEEP APNEA): Status: ACTIVE | Noted: 2023-08-06

## 2023-08-06 PROBLEM — Z87.891 PERSONAL HISTORY OF TOBACCO USE, PRESENTING HAZARDS TO HEALTH: Status: ACTIVE | Noted: 2023-08-06

## 2023-08-06 PROBLEM — E87.20 LACTIC ACIDOSIS: Status: ACTIVE | Noted: 2023-08-06

## 2023-08-06 PROBLEM — N17.9 ACUTE RENAL FAILURE SUPERIMPOSED ON STAGE 3B CHRONIC KIDNEY DISEASE (HCC): Status: ACTIVE | Noted: 2023-08-06

## 2023-08-06 PROBLEM — I42.8 VALVULAR CARDIOMYOPATHY (HCC): Status: ACTIVE | Noted: 2023-08-06

## 2023-08-06 LAB
ALBUMIN SERPL-MCNC: 3.2 G/DL (ref 3.4–5)
ALBUMIN/GLOB SERPL: 1.1 (ref 0.8–1.7)
ALP SERPL-CCNC: 116 U/L (ref 45–117)
ALT SERPL-CCNC: 16 U/L (ref 13–56)
ANION GAP SERPL CALC-SCNC: 3 MMOL/L (ref 3–18)
ANION GAP SERPL CALC-SCNC: 8 MMOL/L (ref 3–18)
ANION GAP SERPL CALC-SCNC: 9 MMOL/L (ref 3–18)
AST SERPL-CCNC: 7 U/L (ref 10–38)
BASOPHILS # BLD: 0 K/UL (ref 0–0.1)
BASOPHILS NFR BLD: 0 % (ref 0–2)
BILIRUB SERPL-MCNC: 0.4 MG/DL (ref 0.2–1)
BUN SERPL-MCNC: 46 MG/DL (ref 7–18)
BUN SERPL-MCNC: 60 MG/DL (ref 7–18)
BUN SERPL-MCNC: 61 MG/DL (ref 7–18)
BUN/CREAT SERPL: 13 (ref 12–20)
BUN/CREAT SERPL: 15 (ref 12–20)
BUN/CREAT SERPL: 16 (ref 12–20)
CALCIUM SERPL-MCNC: 8.5 MG/DL (ref 8.5–10.1)
CALCIUM SERPL-MCNC: 8.8 MG/DL (ref 8.5–10.1)
CALCIUM SERPL-MCNC: 8.8 MG/DL (ref 8.5–10.1)
CHLORIDE SERPL-SCNC: 105 MMOL/L (ref 100–111)
CHLORIDE SERPL-SCNC: 106 MMOL/L (ref 100–111)
CHLORIDE SERPL-SCNC: 108 MMOL/L (ref 100–111)
CO2 SERPL-SCNC: 19 MMOL/L (ref 21–32)
CO2 SERPL-SCNC: 19 MMOL/L (ref 21–32)
CO2 SERPL-SCNC: 22 MMOL/L (ref 21–32)
CREAT SERPL-MCNC: 3.43 MG/DL (ref 0.6–1.3)
CREAT SERPL-MCNC: 3.88 MG/DL (ref 0.6–1.3)
CREAT SERPL-MCNC: 4 MG/DL (ref 0.6–1.3)
DIFFERENTIAL METHOD BLD: ABNORMAL
EOSINOPHIL # BLD: 0 K/UL (ref 0–0.4)
EOSINOPHIL NFR BLD: 0 % (ref 0–5)
ERYTHROCYTE [DISTWIDTH] IN BLOOD BY AUTOMATED COUNT: 17 % (ref 11.6–14.5)
GLOBULIN SER CALC-MCNC: 3 G/DL (ref 2–4)
GLUCOSE BLD STRIP.AUTO-MCNC: 193 MG/DL (ref 70–110)
GLUCOSE BLD STRIP.AUTO-MCNC: 200 MG/DL (ref 70–110)
GLUCOSE BLD STRIP.AUTO-MCNC: 203 MG/DL (ref 70–110)
GLUCOSE BLD STRIP.AUTO-MCNC: 223 MG/DL (ref 70–110)
GLUCOSE BLD STRIP.AUTO-MCNC: 302 MG/DL (ref 70–110)
GLUCOSE SERPL-MCNC: 148 MG/DL (ref 74–99)
GLUCOSE SERPL-MCNC: 171 MG/DL (ref 74–99)
GLUCOSE SERPL-MCNC: 181 MG/DL (ref 74–99)
HCT VFR BLD AUTO: 26.3 % (ref 35–45)
HGB BLD-MCNC: 7.6 G/DL (ref 12–16)
IMM GRANULOCYTES # BLD AUTO: 0.2 K/UL (ref 0–0.04)
IMM GRANULOCYTES NFR BLD AUTO: 2 % (ref 0–0.5)
LYMPHOCYTES # BLD: 0.5 K/UL (ref 0.9–3.6)
LYMPHOCYTES NFR BLD: 6 % (ref 21–52)
MAGNESIUM SERPL-MCNC: 2.4 MG/DL (ref 1.6–2.6)
MCH RBC QN AUTO: 27.8 PG (ref 24–34)
MCHC RBC AUTO-ENTMCNC: 28.9 G/DL (ref 31–37)
MCV RBC AUTO: 96.3 FL (ref 78–100)
MONOCYTES # BLD: 0.3 K/UL (ref 0.05–1.2)
MONOCYTES NFR BLD: 4 % (ref 3–10)
NEUTS SEG # BLD: 7.7 K/UL (ref 1.8–8)
NEUTS SEG NFR BLD: 89 % (ref 40–73)
NRBC # BLD: 0.4 K/UL (ref 0–0.01)
NRBC BLD-RTO: 4.6 PER 100 WBC
PLATELET # BLD AUTO: 412 K/UL (ref 135–420)
PMV BLD AUTO: 9.6 FL (ref 9.2–11.8)
POTASSIUM SERPL-SCNC: 5.4 MMOL/L (ref 3.5–5.5)
POTASSIUM SERPL-SCNC: 5.9 MMOL/L (ref 3.5–5.5)
POTASSIUM SERPL-SCNC: 6.4 MMOL/L (ref 3.5–5.5)
PROT SERPL-MCNC: 6.2 G/DL (ref 6.4–8.2)
RBC # BLD AUTO: 2.73 M/UL (ref 4.2–5.3)
SODIUM SERPL-SCNC: 133 MMOL/L (ref 136–145)
WBC # BLD AUTO: 8.7 K/UL (ref 4.6–13.2)

## 2023-08-06 PROCEDURE — 99291 CRITICAL CARE FIRST HOUR: CPT | Performed by: INTERNAL MEDICINE

## 2023-08-06 PROCEDURE — 6370000000 HC RX 637 (ALT 250 FOR IP): Performed by: PHYSICIAN ASSISTANT

## 2023-08-06 PROCEDURE — 6370000000 HC RX 637 (ALT 250 FOR IP): Performed by: INTERNAL MEDICINE

## 2023-08-06 PROCEDURE — 6360000002 HC RX W HCPCS: Performed by: INTERNAL MEDICINE

## 2023-08-06 PROCEDURE — 6370000000 HC RX 637 (ALT 250 FOR IP): Performed by: EMERGENCY MEDICINE

## 2023-08-06 PROCEDURE — 93005 ELECTROCARDIOGRAM TRACING: CPT | Performed by: PHYSICIAN ASSISTANT

## 2023-08-06 PROCEDURE — 94640 AIRWAY INHALATION TREATMENT: CPT

## 2023-08-06 PROCEDURE — 2580000003 HC RX 258: Performed by: PHYSICIAN ASSISTANT

## 2023-08-06 PROCEDURE — 80048 BASIC METABOLIC PNL TOTAL CA: CPT

## 2023-08-06 PROCEDURE — 51702 INSERT TEMP BLADDER CATH: CPT

## 2023-08-06 PROCEDURE — 83735 ASSAY OF MAGNESIUM: CPT

## 2023-08-06 PROCEDURE — 1100000003 HC PRIVATE W/ TELEMETRY

## 2023-08-06 PROCEDURE — 2700000000 HC OXYGEN THERAPY PER DAY

## 2023-08-06 PROCEDURE — 5A1D70Z PERFORMANCE OF URINARY FILTRATION, INTERMITTENT, LESS THAN 6 HOURS PER DAY: ICD-10-PCS | Performed by: INTERNAL MEDICINE

## 2023-08-06 PROCEDURE — 51798 US URINE CAPACITY MEASURE: CPT

## 2023-08-06 PROCEDURE — 2580000003 HC RX 258: Performed by: INTERNAL MEDICINE

## 2023-08-06 PROCEDURE — 85025 COMPLETE CBC W/AUTO DIFF WBC: CPT

## 2023-08-06 PROCEDURE — 94761 N-INVAS EAR/PLS OXIMETRY MLT: CPT

## 2023-08-06 PROCEDURE — 99233 SBSQ HOSP IP/OBS HIGH 50: CPT | Performed by: PHYSICIAN ASSISTANT

## 2023-08-06 PROCEDURE — 2500000003 HC RX 250 WO HCPCS: Performed by: PHYSICIAN ASSISTANT

## 2023-08-06 PROCEDURE — 80053 COMPREHEN METABOLIC PANEL: CPT

## 2023-08-06 PROCEDURE — 2500000003 HC RX 250 WO HCPCS: Performed by: INTERNAL MEDICINE

## 2023-08-06 PROCEDURE — APPNB30 APP NON BILLABLE TIME 0-30 MINS: Performed by: PHYSICIAN ASSISTANT

## 2023-08-06 PROCEDURE — 6360000002 HC RX W HCPCS: Performed by: EMERGENCY MEDICINE

## 2023-08-06 PROCEDURE — 36415 COLL VENOUS BLD VENIPUNCTURE: CPT

## 2023-08-06 PROCEDURE — 6360000002 HC RX W HCPCS: Performed by: PHYSICIAN ASSISTANT

## 2023-08-06 PROCEDURE — 82962 GLUCOSE BLOOD TEST: CPT

## 2023-08-06 RX ORDER — DEXTROSE MONOHYDRATE 100 MG/ML
INJECTION, SOLUTION INTRAVENOUS CONTINUOUS PRN
Status: DISCONTINUED | OUTPATIENT
Start: 2023-08-06 | End: 2023-08-06 | Stop reason: SDUPTHER

## 2023-08-06 RX ORDER — PREDNISONE 20 MG/1
40 TABLET ORAL 2 TIMES DAILY WITH MEALS
Status: DISCONTINUED | OUTPATIENT
Start: 2023-08-06 | End: 2023-08-10

## 2023-08-06 RX ORDER — PANTOPRAZOLE SODIUM 40 MG/1
40 TABLET, DELAYED RELEASE ORAL
Status: DISCONTINUED | OUTPATIENT
Start: 2023-08-07 | End: 2023-08-11 | Stop reason: HOSPADM

## 2023-08-06 RX ORDER — IPRATROPIUM BROMIDE AND ALBUTEROL SULFATE 2.5; .5 MG/3ML; MG/3ML
1 SOLUTION RESPIRATORY (INHALATION) EVERY 4 HOURS PRN
Status: DISCONTINUED | OUTPATIENT
Start: 2023-08-06 | End: 2023-08-11 | Stop reason: HOSPADM

## 2023-08-06 RX ORDER — CALCIUM GLUCONATE 94 MG/ML
1000 INJECTION, SOLUTION INTRAVENOUS ONCE
Status: DISCONTINUED | OUTPATIENT
Start: 2023-08-06 | End: 2023-08-06

## 2023-08-06 RX ORDER — SODIUM POLYSTYRENE SULFONATE 15 G/60ML
15 SUSPENSION ORAL; RECTAL EVERY 6 HOURS
Status: DISCONTINUED | OUTPATIENT
Start: 2023-08-06 | End: 2023-08-06

## 2023-08-06 RX ADMIN — HYDROMORPHONE HYDROCHLORIDE 0.5 MG: 1 INJECTION, SOLUTION INTRAMUSCULAR; INTRAVENOUS; SUBCUTANEOUS at 21:43

## 2023-08-06 RX ADMIN — HYDROMORPHONE HYDROCHLORIDE 0.5 MG: 1 INJECTION, SOLUTION INTRAMUSCULAR; INTRAVENOUS; SUBCUTANEOUS at 05:55

## 2023-08-06 RX ADMIN — HYDROMORPHONE HYDROCHLORIDE 0.5 MG: 1 INJECTION, SOLUTION INTRAMUSCULAR; INTRAVENOUS; SUBCUTANEOUS at 15:07

## 2023-08-06 RX ADMIN — INSULIN HUMAN 10 UNITS: 100 INJECTION, SOLUTION PARENTERAL at 17:27

## 2023-08-06 RX ADMIN — Medication 5 MG: at 21:42

## 2023-08-06 RX ADMIN — INSULIN LISPRO 1 UNITS: 100 INJECTION, SOLUTION INTRAVENOUS; SUBCUTANEOUS at 09:08

## 2023-08-06 RX ADMIN — BUMETANIDE 1 MG: 0.25 INJECTION INTRAMUSCULAR; INTRAVENOUS at 08:49

## 2023-08-06 RX ADMIN — SODIUM ZIRCONIUM CYCLOSILICATE 10 G: 10 POWDER, FOR SUSPENSION ORAL at 10:05

## 2023-08-06 RX ADMIN — GUAIFENESIN 600 MG: 600 TABLET, EXTENDED RELEASE ORAL at 21:43

## 2023-08-06 RX ADMIN — SODIUM BICARBONATE 50 MEQ: 84 INJECTION INTRAVENOUS at 17:28

## 2023-08-06 RX ADMIN — INSULIN LISPRO 1 UNITS: 100 INJECTION, SOLUTION INTRAVENOUS; SUBCUTANEOUS at 16:39

## 2023-08-06 RX ADMIN — INSULIN GLARGINE 7 UNITS: 100 INJECTION, SOLUTION SUBCUTANEOUS at 21:41

## 2023-08-06 RX ADMIN — AZITHROMYCIN MONOHYDRATE 500 MG: 250 TABLET ORAL at 08:49

## 2023-08-06 RX ADMIN — HYDROMORPHONE HYDROCHLORIDE 0.5 MG: 1 INJECTION, SOLUTION INTRAMUSCULAR; INTRAVENOUS; SUBCUTANEOUS at 10:05

## 2023-08-06 RX ADMIN — BUDESONIDE 1 MG: 1 SUSPENSION RESPIRATORY (INHALATION) at 19:17

## 2023-08-06 RX ADMIN — BUMETANIDE 1 MG: 0.25 INJECTION INTRAMUSCULAR; INTRAVENOUS at 21:44

## 2023-08-06 RX ADMIN — GUAIFENESIN 600 MG: 600 TABLET, EXTENDED RELEASE ORAL at 08:49

## 2023-08-06 RX ADMIN — HYDRALAZINE HYDROCHLORIDE 25 MG: 25 TABLET, FILM COATED ORAL at 15:01

## 2023-08-06 RX ADMIN — ASPIRIN 81 MG 162 MG: 81 TABLET ORAL at 08:49

## 2023-08-06 RX ADMIN — WATER 60 MG: 1 INJECTION INTRAMUSCULAR; INTRAVENOUS; SUBCUTANEOUS at 08:49

## 2023-08-06 RX ADMIN — WATER 60 MG: 1 INJECTION INTRAMUSCULAR; INTRAVENOUS; SUBCUTANEOUS at 01:04

## 2023-08-06 RX ADMIN — PREDNISONE 40 MG: 20 TABLET ORAL at 16:38

## 2023-08-06 RX ADMIN — HYDRALAZINE HYDROCHLORIDE 25 MG: 25 TABLET, FILM COATED ORAL at 21:43

## 2023-08-06 RX ADMIN — BUDESONIDE 1 MG: 1 SUSPENSION RESPIRATORY (INHALATION) at 07:45

## 2023-08-06 RX ADMIN — GABAPENTIN 100 MG: 100 CAPSULE ORAL at 08:49

## 2023-08-06 RX ADMIN — HYDRALAZINE HYDROCHLORIDE 25 MG: 25 TABLET, FILM COATED ORAL at 05:46

## 2023-08-06 RX ADMIN — GABAPENTIN 100 MG: 100 CAPSULE ORAL at 21:42

## 2023-08-06 RX ADMIN — DEXTROSE MONOHYDRATE 250 ML: 10 INJECTION, SOLUTION INTRAVENOUS at 17:28

## 2023-08-06 RX ADMIN — ARFORMOTEROL TARTRATE 15 MCG: 15 SOLUTION RESPIRATORY (INHALATION) at 07:45

## 2023-08-06 RX ADMIN — HEPARIN SODIUM 5000 UNITS: 5000 INJECTION INTRAVENOUS; SUBCUTANEOUS at 05:46

## 2023-08-06 RX ADMIN — SODIUM ZIRCONIUM CYCLOSILICATE 10 G: 10 POWDER, FOR SUSPENSION ORAL at 18:14

## 2023-08-06 RX ADMIN — ARFORMOTEROL TARTRATE 15 MCG: 15 SOLUTION RESPIRATORY (INHALATION) at 19:17

## 2023-08-06 RX ADMIN — IPRATROPIUM BROMIDE AND ALBUTEROL SULFATE 1 DOSE: .5; 3 SOLUTION RESPIRATORY (INHALATION) at 14:10

## 2023-08-06 RX ADMIN — IPRATROPIUM BROMIDE AND ALBUTEROL SULFATE 1 DOSE: .5; 3 SOLUTION RESPIRATORY (INHALATION) at 07:45

## 2023-08-06 RX ADMIN — METOPROLOL SUCCINATE 25 MG: 25 TABLET, EXTENDED RELEASE ORAL at 08:49

## 2023-08-06 RX ADMIN — SODIUM ZIRCONIUM CYCLOSILICATE 10 G: 10 POWDER, FOR SUSPENSION ORAL at 21:44

## 2023-08-06 ASSESSMENT — PAIN DESCRIPTION - LOCATION
LOCATION: ABDOMEN

## 2023-08-06 ASSESSMENT — PAIN SCALES - GENERAL
PAINLEVEL_OUTOF10: 3
PAINLEVEL_OUTOF10: 10
PAINLEVEL_OUTOF10: 8
PAINLEVEL_OUTOF10: 3
PAINLEVEL_OUTOF10: 0
PAINLEVEL_OUTOF10: 5
PAINLEVEL_OUTOF10: 9
PAINLEVEL_OUTOF10: 10
PAINLEVEL_OUTOF10: 3
PAINLEVEL_OUTOF10: 10

## 2023-08-06 ASSESSMENT — PAIN DESCRIPTION - ORIENTATION
ORIENTATION: UPPER
ORIENTATION: UPPER
ORIENTATION: ANTERIOR;UPPER
ORIENTATION: UPPER

## 2023-08-06 ASSESSMENT — PAIN DESCRIPTION - DESCRIPTORS
DESCRIPTORS: ACHING;STABBING
DESCRIPTORS: STABBING
DESCRIPTORS: ACHING

## 2023-08-06 NOTE — FLOWSHEET NOTE
08/06/23 1700   Treatment Team Notification   Reason for Communication Critical results   Type of Critical Result Laboratory   Critical Lab Information K+ 6.4   Person Result Received From [facility] RN   Critical Lab Result Type Other (comment)  (K+ 6.4)   Name of Team Member Notified [facility]   Treatment Team Role Physician Assistant   Method of Communication Call   Response See orders   Notification Time 1700

## 2023-08-06 NOTE — PROGRESS NOTES
1915  Bedside and Verbal shift change report given to [address] (oncoming nurse) by Feroz Mix (offgoing nurse). Report included the following information Nurse Handoff Report, Intake/Output, MAR, and Quality Measures.

## 2023-08-06 NOTE — PROGRESS NOTES
@ [phone] in by Nephrologist to dialyze patient. [address] [facility] with primary RN BEN Enamorado, and was told that patient does not have a access and to her knowledge she is not been made aware of patient having one placed tonight.    @[phone] call placed to Nephrologist to inform him of what this writer was told by the primary RN. Per Md he personally spoke w/Vascular Surgeon Jaycob Hartmann who stated they will be placing a [facility].    @[phone] called primary RN s/w Amelia who stated she was not made aware of patient having an access placed tonight and she is not able to contact vascular to confirm whether or not an access will be placed at this time.

## 2023-08-06 NOTE — PROGRESS NOTES
Was informed about repeat BMP, K 6.4. Reviewed BMP and also noted bicarb 19, Cr up to 3.88. Hyperkalemia protocol - insulin/d10, sodium bicarb, and TID lokelma x3 ordered. EKG also ordered to determine need for calcium. Patient already on cardiac monitoring. Attending and nephrology notified.     1825: EKG reviewed: no peaked T waves     Selin Wahl PA-C  [address] [facility]  Office:  [phone]

## 2023-08-06 NOTE — PROGRESS NOTES
1120: Bladder scan 501mL. Verbal order to place marino catheter. Pt updated by MD at bedside. 1300: Marino inserted with Scarlet KNOX. 500mL marino output. 1500: Pt refused heparin SQ, stated it makes her bleed vaginally. Medication education provided. 1930: Chacorta ASCENCIO updated on all meds given to lower K+. Dialysis RN called and was updated that cath may be placed tomorrow if pt agrees. Phlebotomy called and verbalized understanding of lab re-drawn needed for 2000.

## 2023-08-06 NOTE — PROGRESS NOTES
RENAL DAILY PROGRESS NOTE    Patient: Dana Meza               Sex: female          DOA: 8/3/2023 11:13 PM        YOB: 1950      Age:  72 y.o.        LOS:  LOS: 2 days     Subjective:     Dana Meza is a 72 y.o.  who presents with Lactic acidosis [E87.20]  Respiratory distress [R06.03]  SIRS (systemic inflammatory response syndrome) (HCC) [R65.10]  Respiratory failure with hypoxia (HCC) [J96.91]  Acute on chronic respiratory failure with hypoxia (HCC) [J96.21]  Opacities of both lungs present on chest x-ray [R91.8]. Asked to evaluate fot crf,was on dialysis for few months. admitted with sob,peripheral edema  Chief complaints  - Reviewed last 24 hrs events     Current Facility-Administered Medications   Medication Dose Route Frequency    HYDROmorphone (DILAUDID) injection 0.5 mg  0.5 mg IntraVENous Q4H PRN    naloxone (NARCAN) injection 0.4 mg  0.4 mg IntraVENous PRN    epoetin agnes-epbx (RETACRIT) injection 5,000 Units  5,000 Units SubCUTAneous Once per day on Tue Thu Sat    guaiFENesin [facility]) extended release tablet 600 mg  600 mg Oral BID    [Held by provider] bumetanide (BUMEX) injection 1 mg  1 mg IntraVENous BID    nitroGLYCERIN (NITROSTAT) SL tablet 0.4 mg  0.4 mg SubLINGual Q5 Min PRN    insulin lispro (HUMALOG) injection vial 0-4 Units  0-4 Units SubCUTAneous TID WC    insulin lispro (HUMALOG) injection vial 0-4 Units  0-4 Units SubCUTAneous Nightly    acetaminophen (TYLENOL) tablet 650 mg  650 mg Oral Q4H PRN    ondansetron (ZOFRAN) injection 4 mg  4 mg IntraVENous Q6H PRN    bisacodyl (DULCOLAX) EC tablet 5 mg  5 mg Oral Daily PRN    aspirin chewable tablet 162 mg  162 mg Oral Daily    gabapentin (NEURONTIN) capsule 100 mg  100 mg Oral BID    arformoterol tartrate (BROVANA) nebulizer solution 15 mcg  15 mcg Nebulization BID RT    metoprolol succinate (TOPROL XL) extended release tablet 25 mg  25 mg Oral Daily    hydrALAZINE (APRESOLINE) tablet 25 mg  25 mg Oral 3

## 2023-08-06 NOTE — PROGRESS NOTES
Renal function and hyperkalemia are worse despite marino cath  I consulted dr Mary Alice Rodriguez  to insert dialysis cath  Dialysis orders written and dialysis nurse notified  I had discussed with the pt earlier than she may need to go back on dialysis

## 2023-08-06 NOTE — PLAN OF CARE
Problem: Discharge Planning  Goal: Discharge to home or other facility with appropriate resources  Outcome: Progressing  Flowsheets (Taken 8/5/2023 2000 by Pilo Rizvi RN)  Discharge to home or other facility with appropriate resources:   Identify barriers to discharge with patient and caregiver   Arrange for needed discharge resources and transportation as appropriate   Identify discharge learning needs (meds, wound care, etc)   Refer to discharge planning if patient needs post-hospital services based on physician order or complex needs related to functional status, cognitive ability or social support system     Problem: Safety - Adult  Goal: Free from fall injury  Outcome: Progressing     Problem: Pain  Goal: Verbalizes/displays adequate comfort level or baseline comfort level  Outcome: Progressing     Problem: Chronic Conditions and Co-morbidities  Goal: Patient's chronic conditions and co-morbidity symptoms are monitored and maintained or improved  Outcome: Progressing  Flowsheets (Taken 8/5/2023 2000 by Pilo Rizvi RN)  Care Plan - Patient's Chronic Conditions and Co-Morbidity Symptoms are Monitored and Maintained or Improved:   Monitor and assess patient's chronic conditions and comorbid symptoms for stability, deterioration, or improvement   Collaborate with multidisciplinary team to address chronic and comorbid conditions and prevent exacerbation or deterioration   Update acute care plan with appropriate goals if chronic or comorbid symptoms are exacerbated and prevent overall improvement and discharge     Problem: Neurosensory - Adult  Goal: Achieves stable or improved neurological status  Outcome: Progressing  Goal: Achieves maximal functionality and self care  Outcome: Progressing     Problem: Respiratory - Adult  Goal: Achieves optimal ventilation and oxygenation  Outcome: Progressing     Problem: Cardiovascular - Adult  Goal: Maintains optimal cardiac output and hemodynamic stability  Outcome:

## 2023-08-07 PROBLEM — E44.1 MILD PROTEIN-CALORIE MALNUTRITION (HCC): Chronic | Status: ACTIVE | Noted: 2023-08-07

## 2023-08-07 LAB
ALBUMIN SERPL-MCNC: 3.4 G/DL (ref 3.4–5)
ANION GAP SERPL CALC-SCNC: 10 MMOL/L (ref 3–18)
BASOPHILS # BLD: 0 K/UL (ref 0–0.1)
BASOPHILS NFR BLD: 0 % (ref 0–2)
BUN SERPL-MCNC: 64 MG/DL (ref 7–18)
BUN/CREAT SERPL: 16 (ref 12–20)
CALCIUM SERPL-MCNC: 8.6 MG/DL (ref 8.5–10.1)
CHLORIDE SERPL-SCNC: 104 MMOL/L (ref 100–111)
CO2 SERPL-SCNC: 20 MMOL/L (ref 21–32)
CREAT SERPL-MCNC: 3.96 MG/DL (ref 0.6–1.3)
DIFFERENTIAL METHOD BLD: ABNORMAL
EOSINOPHIL # BLD: 0 K/UL (ref 0–0.4)
EOSINOPHIL NFR BLD: 0 % (ref 0–5)
ERYTHROCYTE [DISTWIDTH] IN BLOOD BY AUTOMATED COUNT: 17 % (ref 11.6–14.5)
GLUCOSE BLD STRIP.AUTO-MCNC: 163 MG/DL (ref 70–110)
GLUCOSE BLD STRIP.AUTO-MCNC: 165 MG/DL (ref 70–110)
GLUCOSE BLD STRIP.AUTO-MCNC: 188 MG/DL (ref 70–110)
GLUCOSE BLD STRIP.AUTO-MCNC: 272 MG/DL (ref 70–110)
GLUCOSE SERPL-MCNC: 143 MG/DL (ref 74–99)
HCT VFR BLD AUTO: 27 % (ref 35–45)
HGB BLD-MCNC: 8 G/DL (ref 12–16)
IMM GRANULOCYTES # BLD AUTO: 0 K/UL (ref 0–0.04)
IMM GRANULOCYTES NFR BLD AUTO: 0 % (ref 0–0.5)
LYMPHOCYTES # BLD: 1.7 K/UL (ref 0.9–3.6)
LYMPHOCYTES NFR BLD: 20 % (ref 21–52)
MCH RBC QN AUTO: 27.9 PG (ref 24–34)
MCHC RBC AUTO-ENTMCNC: 29.6 G/DL (ref 31–37)
MCV RBC AUTO: 94.1 FL (ref 78–100)
MONOCYTES # BLD: 0.5 K/UL (ref 0.05–1.2)
MONOCYTES NFR BLD: 6 % (ref 3–10)
MYELOCYTES NFR BLD MANUAL: 1 %
NEUTS BAND NFR BLD MANUAL: 1 %
NEUTS SEG # BLD: 6.4 K/UL (ref 1.8–8)
NEUTS SEG NFR BLD: 72 % (ref 40–73)
NRBC # BLD: 1.1 K/UL (ref 0–0.01)
NRBC BLD-RTO: 12.7 PER 100 WBC
PHOSPHATE SERPL-MCNC: 5.5 MG/DL (ref 2.5–4.9)
PLATELET # BLD AUTO: 451 K/UL (ref 135–420)
PLATELET COMMENT: ABNORMAL
PMV BLD AUTO: 9.5 FL (ref 9.2–11.8)
POTASSIUM SERPL-SCNC: 5.2 MMOL/L (ref 3.5–5.5)
RBC # BLD AUTO: 2.87 M/UL (ref 4.2–5.3)
RBC MORPH BLD: ABNORMAL
SODIUM SERPL-SCNC: 134 MMOL/L (ref 136–145)
WBC # BLD AUTO: 8.7 K/UL (ref 4.6–13.2)

## 2023-08-07 PROCEDURE — 99232 SBSQ HOSP IP/OBS MODERATE 35: CPT | Performed by: INTERNAL MEDICINE

## 2023-08-07 PROCEDURE — 6370000000 HC RX 637 (ALT 250 FOR IP): Performed by: INTERNAL MEDICINE

## 2023-08-07 PROCEDURE — 90935 HEMODIALYSIS ONE EVALUATION: CPT

## 2023-08-07 PROCEDURE — 94761 N-INVAS EAR/PLS OXIMETRY MLT: CPT

## 2023-08-07 PROCEDURE — 85025 COMPLETE CBC W/AUTO DIFF WBC: CPT

## 2023-08-07 PROCEDURE — 6360000002 HC RX W HCPCS: Performed by: EMERGENCY MEDICINE

## 2023-08-07 PROCEDURE — 36415 COLL VENOUS BLD VENIPUNCTURE: CPT

## 2023-08-07 PROCEDURE — 1100000003 HC PRIVATE W/ TELEMETRY

## 2023-08-07 PROCEDURE — 82962 GLUCOSE BLOOD TEST: CPT

## 2023-08-07 PROCEDURE — 6370000000 HC RX 637 (ALT 250 FOR IP): Performed by: PHYSICIAN ASSISTANT

## 2023-08-07 PROCEDURE — 80069 RENAL FUNCTION PANEL: CPT

## 2023-08-07 PROCEDURE — 2500000003 HC RX 250 WO HCPCS: Performed by: PHYSICIAN ASSISTANT

## 2023-08-07 PROCEDURE — 6360000002 HC RX W HCPCS: Performed by: INTERNAL MEDICINE

## 2023-08-07 PROCEDURE — 2500000003 HC RX 250 WO HCPCS: Performed by: INTERNAL MEDICINE

## 2023-08-07 PROCEDURE — 99233 SBSQ HOSP IP/OBS HIGH 50: CPT | Performed by: PHYSICIAN ASSISTANT

## 2023-08-07 PROCEDURE — 6360000002 HC RX W HCPCS: Performed by: PHYSICIAN ASSISTANT

## 2023-08-07 PROCEDURE — 94640 AIRWAY INHALATION TREATMENT: CPT

## 2023-08-07 RX ORDER — NEPHROCAP 1 MG
1 CAP ORAL DAILY
Status: DISCONTINUED | OUTPATIENT
Start: 2023-08-07 | End: 2023-08-11 | Stop reason: HOSPADM

## 2023-08-07 RX ORDER — ISOSORBIDE MONONITRATE 30 MG/1
30 TABLET, EXTENDED RELEASE ORAL DAILY
Status: DISCONTINUED | OUTPATIENT
Start: 2023-08-07 | End: 2023-08-11 | Stop reason: HOSPADM

## 2023-08-07 RX ORDER — ASPIRIN 81 MG/1
81 TABLET, CHEWABLE ORAL DAILY
Status: DISCONTINUED | OUTPATIENT
Start: 2023-08-08 | End: 2023-08-11 | Stop reason: HOSPADM

## 2023-08-07 RX ORDER — ATORVASTATIN CALCIUM 40 MG/1
40 TABLET, FILM COATED ORAL NIGHTLY
Status: DISCONTINUED | OUTPATIENT
Start: 2023-08-07 | End: 2023-08-11 | Stop reason: HOSPADM

## 2023-08-07 RX ADMIN — GUAIFENESIN 600 MG: 600 TABLET, EXTENDED RELEASE ORAL at 09:05

## 2023-08-07 RX ADMIN — BUMETANIDE 1 MG: 0.25 INJECTION INTRAMUSCULAR; INTRAVENOUS at 09:06

## 2023-08-07 RX ADMIN — ISOSORBIDE MONONITRATE 30 MG: 30 TABLET, EXTENDED RELEASE ORAL at 13:04

## 2023-08-07 RX ADMIN — Medication 5 MG: at 20:30

## 2023-08-07 RX ADMIN — ARFORMOTEROL TARTRATE 15 MCG: 15 SOLUTION RESPIRATORY (INHALATION) at 19:35

## 2023-08-07 RX ADMIN — SODIUM ZIRCONIUM CYCLOSILICATE 10 G: 10 POWDER, FOR SUSPENSION ORAL at 01:13

## 2023-08-07 RX ADMIN — INSULIN GLARGINE 7 UNITS: 100 INJECTION, SOLUTION SUBCUTANEOUS at 20:38

## 2023-08-07 RX ADMIN — HYDROMORPHONE HYDROCHLORIDE 0.5 MG: 1 INJECTION, SOLUTION INTRAMUSCULAR; INTRAVENOUS; SUBCUTANEOUS at 09:04

## 2023-08-07 RX ADMIN — ACETAMINOPHEN 325MG 650 MG: 325 TABLET ORAL at 17:25

## 2023-08-07 RX ADMIN — METOPROLOL SUCCINATE 25 MG: 25 TABLET, EXTENDED RELEASE ORAL at 09:05

## 2023-08-07 RX ADMIN — BUDESONIDE 1 MG: 1 SUSPENSION RESPIRATORY (INHALATION) at 19:35

## 2023-08-07 RX ADMIN — GABAPENTIN 100 MG: 100 CAPSULE ORAL at 09:05

## 2023-08-07 RX ADMIN — NEPHROCAP 1 MG: 1 CAP ORAL at 17:54

## 2023-08-07 RX ADMIN — PREDNISONE 40 MG: 20 TABLET ORAL at 17:26

## 2023-08-07 RX ADMIN — BUMETANIDE 1 MG: 0.25 INJECTION INTRAMUSCULAR; INTRAVENOUS at 20:29

## 2023-08-07 RX ADMIN — HYDRALAZINE HYDROCHLORIDE 25 MG: 25 TABLET, FILM COATED ORAL at 20:30

## 2023-08-07 RX ADMIN — SODIUM BICARBONATE 50 MEQ: 84 INJECTION, SOLUTION INTRAVENOUS at 01:09

## 2023-08-07 RX ADMIN — HYDROMORPHONE HYDROCHLORIDE 0.5 MG: 1 INJECTION, SOLUTION INTRAMUSCULAR; INTRAVENOUS; SUBCUTANEOUS at 20:31

## 2023-08-07 RX ADMIN — GUAIFENESIN 600 MG: 600 TABLET, EXTENDED RELEASE ORAL at 20:31

## 2023-08-07 RX ADMIN — PANTOPRAZOLE SODIUM 40 MG: 40 TABLET, DELAYED RELEASE ORAL at 05:51

## 2023-08-07 RX ADMIN — HEPARIN SODIUM 5000 UNITS: 5000 INJECTION INTRAVENOUS; SUBCUTANEOUS at 13:04

## 2023-08-07 RX ADMIN — ATORVASTATIN CALCIUM 40 MG: 40 TABLET, FILM COATED ORAL at 20:30

## 2023-08-07 RX ADMIN — ASPIRIN 81 MG 162 MG: 81 TABLET ORAL at 09:05

## 2023-08-07 RX ADMIN — PREDNISONE 40 MG: 20 TABLET ORAL at 09:05

## 2023-08-07 RX ADMIN — HEPARIN SODIUM 5000 UNITS: 5000 INJECTION INTRAVENOUS; SUBCUTANEOUS at 05:51

## 2023-08-07 RX ADMIN — HYDRALAZINE HYDROCHLORIDE 25 MG: 25 TABLET, FILM COATED ORAL at 05:51

## 2023-08-07 RX ADMIN — GABAPENTIN 100 MG: 100 CAPSULE ORAL at 20:30

## 2023-08-07 ASSESSMENT — PAIN - FUNCTIONAL ASSESSMENT
PAIN_FUNCTIONAL_ASSESSMENT: PREVENTS OR INTERFERES WITH MANY ACTIVE NOT PASSIVE ACTIVITIES
PAIN_FUNCTIONAL_ASSESSMENT: ACTIVITIES ARE NOT PREVENTED

## 2023-08-07 ASSESSMENT — PAIN SCALES - GENERAL
PAINLEVEL_OUTOF10: 0
PAINLEVEL_OUTOF10: 4
PAINLEVEL_OUTOF10: 5
PAINLEVEL_OUTOF10: 10
PAINLEVEL_OUTOF10: 0
PAINLEVEL_OUTOF10: 8
PAINLEVEL_OUTOF10: 9
PAINLEVEL_OUTOF10: 0

## 2023-08-07 ASSESSMENT — PAIN DESCRIPTION - ONSET
ONSET: ON-GOING
ONSET: ON-GOING

## 2023-08-07 ASSESSMENT — PAIN DESCRIPTION - DESCRIPTORS
DESCRIPTORS: ACHING;DISCOMFORT;THROBBING;TENDER
DESCRIPTORS: DISCOMFORT;ACHING
DESCRIPTORS: ACHING;DISCOMFORT
DESCRIPTORS: ACHING;DISCOMFORT

## 2023-08-07 ASSESSMENT — PAIN DESCRIPTION - PAIN TYPE
TYPE: ACUTE PAIN
TYPE: ACUTE PAIN

## 2023-08-07 ASSESSMENT — PAIN DESCRIPTION - LOCATION
LOCATION: ABDOMEN
LOCATION: HEAD;ABDOMEN
LOCATION: ABDOMEN

## 2023-08-07 ASSESSMENT — PAIN DESCRIPTION - ORIENTATION
ORIENTATION: UPPER
ORIENTATION: UPPER
ORIENTATION: MID
ORIENTATION: OTHER (COMMENT)

## 2023-08-07 ASSESSMENT — PAIN DESCRIPTION - FREQUENCY: FREQUENCY: CONTINUOUS

## 2023-08-07 NOTE — PROGRESS NOTES
Received pre HD report from  HUSEYIN Jones, RN. Pt in bed, A+O x4, no s/s of distress noted. Accessed AVG Left  upper arm w/15G needle w/o difficulty. Tx initiated at 1403. AVG flowing with ease. For hemodynamic stability UF goal 2500 ml. Offered assistance with repositioning every 2 hours. Vascular access visible at all times throughout entire duration of treatment and line connections remain intact throughout entire duration of treatment. Tx completed at [phone], tolerated well 2L removed. De-accessed per protocol. Clot time 5 minutes for arterial, and 5 minutes for venous. Unit nurse HUSEYIN Jones, RN given report.

## 2023-08-07 NOTE — PLAN OF CARE
INTERVENTION:  HEMODYNAMIC STABILIZATION  MAINTAIN BP WNL WHILE ON HD. INTERVENTION:  FLUID MANAGEMENT  WILL ATTEMPT 2500 ML TOTAL FLUID REMOVAL AS TOLERATED. INTERVENTION:  METABOLIC/ELECTROLYTE MANAGEMENT  2.0 POTASSIUM 2.5 CALCIUM DIALYSATE USED WITH HD TODAY. INTERVENTION:  HEMODIALYSIS ACCESS SITE MANAGEMENT  LEFT FOREARM AVG ACCESSED WITH 15G NEEDLE USING ASEPTIC TECHNIQUE. GOAL:  SIGNS AND SYMPTOMS OF LISTED POTENTIAL PROBLEMS WILL BE ABSENT OR MANAGEABLE. OUTCOME:  PROGRESSING. HD PLANNED FOR 3 HOURS TODAY.        Problem: Chronic Conditions and Co-morbidities  Goal: Patient's chronic conditions and co-morbidity symptoms are monitored and maintained or improved  Outcome: Progressing

## 2023-08-07 NOTE — PROGRESS NOTES
Comprehensive Nutrition Assessment    Type and Reason for Visit:  Initial, Positive Nutrition Screen    Nutrition Recommendations/Plan:   Continue Current Diet. Start Oral Nutrition Supplement- Rec adding Nepro (each provides 420 kcal, 19 g protein) once daily at lunch. Rec adding Renal Multivitamin d/t Mild Malnutrition. Continue to monitor tolerance of PO, compliance of oral supplements, weight, labs, and plan of care during admission. Malnutrition Assessment:  Malnutrition Status:  Mild malnutrition (08/07/23 1231)    Context: Chronic Illness  Findings of clinical characteristics of malnutrition:   Energy Intake:  75% or less estimated energy requirements for 1 month or longer  Weight Loss:  No significant weight loss     Body Fat Loss:  No significant body fat loss     Muscle Mass Loss:  No significant muscle mass loss    Fluid Accumulation:  No significant fluid accumulation     Strength:  Not Performed     Nutrition History and Allergies: PMHx: Type II Diabetes, NSTEMI type II, coronary artery disease, hypertension -stage II pressure, Hyperlipidemia, COPD, MICHAEL, tobacco use. Pt reports appetite fluctuating d/t recent surgery. Pt reports consumption of liquid diet consisting of broth, jello, and pudding x 1 month d/t colostomy reversal. Pt reports UBW of 232 lbs x 1 month and recent weight loss of ~40 lbs d/t fluid shift, on HD 2-3 months ago. Wt hx: 201 lbs (08/06/23), 205 lbs (07/07/23), weight stable x 1 month, 202 lbs (05/04/23), weight stable x 3 months, 244 lbs (09/09/2022), weight loss of 43 lbs (17.6%) x 11 months. Non-significant weight loss. Based on pt's reported UBW, weight loss of 31 lbs (13.3%) x 1 month d/t fluid shift per pt. NKFA. Nutrition Assessment:    Pt presents with complaints of gradually worsening chronic shortness of breath x 2 days, bilateral leg swelling and congested chest. Pt admitted for respiratory failure with hypoxia.  Positive nutrition screen noted, MST:

## 2023-08-07 NOTE — PROGRESS NOTES
1935   Bedside and Verbal shift change report given to LISETTE Cisneros (oncoming nurse) by Bethany Goodrich (offgoing nurse). Report included the following information Nurse Handoff Report, Intake/Output, MAR, and Quality Measures. At [address], received a call from the dialysis nurse (Carmen Magdaleno). She stated \" I understand that you are just receiving the patient but can you contact vascular to know if there is a plan for a vascular access\". I tried to explain to her that I have not seen the patient yet, and based  on the report I just received, she has not decided to go on dialysis yet. Tried to explain to Carmenadalberto Magdaleno that can not call vascular without looking through the chart,  talking to patient and also to here from patient what she and her  have decided to do. That I also need to verify if there is an order for a vascular access. Unfortunately, she hanged up the phone on me. Patient received complete bed bath. Arita catheter care provided and CHG bath completed. 0750   Bedside and Verbal shift change report given to [address] [place] [address] (oncoming nurse) by Mayr Perez (offgoing nurse). Report included the following information Nurse Handoff Report, Intake/Output, MAR, and Quality Measures.

## 2023-08-07 NOTE — PROGRESS NOTES
Vascular surgery     Patient with patent left arm brachial to axillary AV graft  Ok to use if patient needs HD today, no IV in left arm  Please place left arm precautions    Please recall if any concerns with graft access.      Julianna Jansen MD

## 2023-08-07 NOTE — PROGRESS NOTES
8/7/2023 1637  Gross per 24 hour   Intake 2620 ml   Output 2980 ml   Net -360 ml         General Appearance: NAD, conversant  HENT: normocephalic/atraumatic, moist mucus membranes  Neck: No JVD, supple  Lungs: good air exchange, faint crackles bilaterally, no increased resp effort  CV: RRR, no m/r/g  Abdomen: soft, non-tender, normal bowel sounds  Extremities: no cyanosis, no peripheral edema  Neuro: No focal deficits, motor/sensory intact  Skin: Normal color, intact  Psych: appropriate affect, alert and oriented to person, place and time    Current Facility-Administered Medications   Medication Dose Route Frequency    [START ON 8/8/2023] aspirin chewable tablet 81 mg  81 mg Oral Daily    isosorbide mononitrate (IMDUR) extended release tablet 30 mg  30 mg Oral Daily    atorvastatin (LIPITOR) tablet 40 mg  40 mg Oral Nightly    Virt-Caps 1 mg  1 capsule Oral Daily    predniSONE (DELTASONE) tablet 40 mg  40 mg Oral BID with meals    pantoprazole (PROTONIX) tablet 40 mg  40 mg Oral QAM AC    ipratropium 0.5 mg-albuterol 2.5 mg (DUONEB) nebulizer solution 1 Dose  1 Dose Inhalation Q4H PRN    glucose chewable tablet 16 g  4 tablet Oral PRN    HYDROmorphone (DILAUDID) injection 0.5 mg  0.5 mg IntraVENous Q4H PRN    naloxone (NARCAN) injection 0.4 mg  0.4 mg IntraVENous PRN    epoetin agnes-epbx (RETACRIT) injection 5,000 Units  5,000 Units SubCUTAneous Once per day on Tue Thu Sat    guaiFENesin [facility]) extended release tablet 600 mg  600 mg Oral BID    bumetanide (BUMEX) injection 1 mg  1 mg IntraVENous BID    nitroGLYCERIN (NITROSTAT) SL tablet 0.4 mg  0.4 mg SubLINGual Q5 Min PRN    insulin lispro (HUMALOG) injection vial 0-4 Units  0-4 Units SubCUTAneous TID WC    insulin lispro (HUMALOG) injection vial 0-4 Units  0-4 Units SubCUTAneous Nightly    acetaminophen (TYLENOL) tablet 650 mg  650 mg Oral Q4H PRN    ondansetron (ZOFRAN) injection 4 mg  4 mg IntraVENous Q6H PRN    bisacodyl (DULCOLAX) EC tablet 5 mg  5 mg Oral BUN 60 (H) 7.0 - 18 MG/DL    Creatinine 4.00 (H) 0.6 - 1.3 MG/DL    Bun/Cre Ratio 15 12 - 20      Est, Glom Filt Rate 11 (L) >60 ml/min/1.73m2    Calcium 8.8 8.5 - 10.1 MG/DL   CBC with Auto Differential    Collection Time: 08/07/23 12:40 AM   Result Value Ref Range    WBC 8.7 4.6 - 13.2 K/uL    RBC 2.87 (L) 4.20 - 5.30 M/uL    Hemoglobin 8.0 (L) 12.0 - 16.0 g/dL    Hematocrit 27.0 (L) 35.0 - 45.0 %    MCV 94.1 78.0 - 100.0 FL    MCH 27.9 24.0 - 34.0 PG    MCHC 29.6 (L) 31.0 - 37.0 g/dL    RDW 17.0 (H) 11.6 - 14.5 %    Platelets 963 (H) 556 - 420 K/uL    MPV 9.5 9.2 - 11.8 FL    Nucleated RBCs 12.7 (H) 0  WBC    nRBC 1.10 (H) 0.00 - 0.01 K/uL    Neutrophils % 72 40 - 73 %    Band Neutrophils 1 %    Lymphocytes % 20 (L) 21 - 52 %    Monocytes % 6 3 - 10 %    Eosinophils % 0 0 - 5 %    Basophils % 0 0 - 2 %    Myelocytes 1 %    Immature Granulocytes 0 0.0 - 0.5 %    Neutrophils Absolute 6.4 1.8 - 8.0 K/UL    Lymphocytes Absolute 1.7 0.9 - 3.6 K/UL    Monocytes Absolute 0.5 0.05 - 1.2 K/UL    Eosinophils Absolute 0.0 0.0 - 0.4 K/UL    Basophils Absolute 0.0 0.0 - 0.1 K/UL    Absolute Immature Granulocyte 0.0 0.00 - 0.04 K/UL    Differential Type MANUAL      Platelet Comment Increased Platelets      RBC Comment NORMOCYTIC, NORMOCHROMIC      RBC Comment ANISOCYTOSIS  1+        RBC Comment POIKILOCYTOSIS  3+        RBC Comment LUIS CELLS  2+        RBC Comment SCHISTOCYTES  1+        RBC Comment TEARDROP CELLS  1+        RBC Comment 23 NUCRBCS SEEN /WBCs    Renal Function Panel    Collection Time: 08/07/23 12:40 AM   Result Value Ref Range    Sodium 134 (L) 136 - 145 mmol/L    Potassium 5.2 3.5 - 5.5 mmol/L    Chloride 104 100 - 111 mmol/L    CO2 20 (L) 21 - 32 mmol/L    Anion Gap 10 3.0 - 18 mmol/L    Glucose 143 (H) 74 - 99 mg/dL    BUN 64 (H) 7.0 - 18 MG/DL    Creatinine 3.96 (H) 0.6 - 1.3 MG/DL    Bun/Cre Ratio 16 12 - 20      Est, Glom Filt Rate 12 (L) >60 ml/min/1.73m2    Calcium 8.6 8.5 - 10.1 MG/DL

## 2023-08-07 NOTE — PROGRESS NOTES
Potassium is correcting,cancel dialysis tonight,continue [facility]  Give iv bicarb for acidosis   Will discuss dialysis tomorrow if no improvement  Discussed with dr guy perdomo

## 2023-08-07 NOTE — PROGRESS NOTES
Vascular Surgery    Delayed entry    Discussed possible temp HD catheter placement at bedside with Dr. Brandi Godinez. After discussion with Dr. Javon Bellamy, the recommendation was made for an ICU evaluation to determine if the pt needed ICU care, and the temp catheter would be placed in an ICU setting. An ICU consultation performed and further medical management was performed. Depending on clinical status in the AM, temp HD catheter may be requested. I remained on standby and spoke with all consultants/physicians.      Enedina Schaefer MD  Vascular Surgeon

## 2023-08-07 NOTE — PROGRESS NOTES
RENAL DAILY PROGRESS NOTE    Patient: Jojo Hinton               Sex: female          DOA: 8/3/2023 11:13 PM        YOB: 1950      Age:  72 y.o.        LOS:  LOS: 3 days     Subjective:     Jojo Hinton is a 72 y.o.  who presents with Lactic acidosis [E87.20]  Respiratory distress [R06.03]  SIRS (systemic inflammatory response syndrome) (HCC) [R65.10]  Respiratory failure with hypoxia (HCC) [J96.91]  Acute on chronic respiratory failure with hypoxia (HCC) [J96.21]  Opacities of both lungs present on chest x-ray [R91.8]. Asked to evaluate fot crf,was on dialysis for few months. admitted with sob,peripheral edema  Chief complaints still sob,unable to lie flat for cardiac cath  - Reviewed last 24 hrs events     Current Facility-Administered Medications   Medication Dose Route Frequency    predniSONE (DELTASONE) tablet 40 mg  40 mg Oral BID with meals    pantoprazole (PROTONIX) tablet 40 mg  40 mg Oral QAM AC    ipratropium 0.5 mg-albuterol 2.5 mg (DUONEB) nebulizer solution 1 Dose  1 Dose Inhalation Q4H PRN    glucose chewable tablet 16 g  4 tablet Oral PRN    HYDROmorphone (DILAUDID) injection 0.5 mg  0.5 mg IntraVENous Q4H PRN    naloxone (NARCAN) injection 0.4 mg  0.4 mg IntraVENous PRN    epoetin agnes-epbx (RETACRIT) injection 5,000 Units  5,000 Units SubCUTAneous Once per day on Tue Thu Sat    guaiFENesin [facility]) extended release tablet 600 mg  600 mg Oral BID    bumetanide (BUMEX) injection 1 mg  1 mg IntraVENous BID    nitroGLYCERIN (NITROSTAT) SL tablet 0.4 mg  0.4 mg SubLINGual Q5 Min PRN    insulin lispro (HUMALOG) injection vial 0-4 Units  0-4 Units SubCUTAneous TID WC    insulin lispro (HUMALOG) injection vial 0-4 Units  0-4 Units SubCUTAneous Nightly    acetaminophen (TYLENOL) tablet 650 mg  650 mg Oral Q4H PRN    ondansetron (ZOFRAN) injection 4 mg  4 mg IntraVENous Q6H PRN    bisacodyl (DULCOLAX) EC tablet 5 mg  5 mg Oral Daily PRN    aspirin chewable tablet 162 mg

## 2023-08-08 LAB
ALBUMIN SERPL-MCNC: 3.2 G/DL (ref 3.4–5)
ANION GAP SERPL CALC-SCNC: 8 MMOL/L (ref 3–18)
BASOPHILS # BLD: 0 K/UL (ref 0–0.1)
BASOPHILS NFR BLD: 0 % (ref 0–2)
BUN SERPL-MCNC: 55 MG/DL (ref 7–18)
BUN/CREAT SERPL: 16 (ref 12–20)
CALCIUM SERPL-MCNC: 8.3 MG/DL (ref 8.5–10.1)
CHLORIDE SERPL-SCNC: 103 MMOL/L (ref 100–111)
CO2 SERPL-SCNC: 26 MMOL/L (ref 21–32)
CREAT SERPL-MCNC: 3.4 MG/DL (ref 0.6–1.3)
DIFFERENTIAL METHOD BLD: ABNORMAL
EKG ATRIAL RATE: 96 BPM
EKG DIAGNOSIS: NORMAL
EKG P AXIS: 69 DEGREES
EKG P-R INTERVAL: 154 MS
EKG Q-T INTERVAL: 358 MS
EKG QRS DURATION: 88 MS
EKG QTC CALCULATION (BAZETT): 452 MS
EKG R AXIS: 83 DEGREES
EKG T AXIS: 176 DEGREES
EKG VENTRICULAR RATE: 96 BPM
EOSINOPHIL # BLD: 0 K/UL (ref 0–0.4)
EOSINOPHIL NFR BLD: 0 % (ref 0–5)
ERYTHROCYTE [DISTWIDTH] IN BLOOD BY AUTOMATED COUNT: 16.7 % (ref 11.6–14.5)
FERRITIN SERPL-MCNC: 28 NG/ML (ref 8–388)
GLUCOSE BLD STRIP.AUTO-MCNC: 101 MG/DL (ref 70–110)
GLUCOSE BLD STRIP.AUTO-MCNC: 107 MG/DL (ref 70–110)
GLUCOSE BLD STRIP.AUTO-MCNC: 147 MG/DL (ref 70–110)
GLUCOSE BLD STRIP.AUTO-MCNC: 171 MG/DL (ref 70–110)
GLUCOSE SERPL-MCNC: 174 MG/DL (ref 74–99)
HCT VFR BLD AUTO: 26.8 % (ref 35–45)
HGB BLD-MCNC: 8.2 G/DL (ref 12–16)
IMM GRANULOCYTES # BLD AUTO: 0 K/UL
IMM GRANULOCYTES NFR BLD AUTO: 0 %
IRON SATN MFR SERPL: 15 % (ref 20–50)
IRON SERPL-MCNC: 45 UG/DL (ref 50–175)
LYMPHOCYTES # BLD: 0.7 K/UL (ref 0.9–3.6)
LYMPHOCYTES NFR BLD: 9 % (ref 21–52)
MCH RBC QN AUTO: 28.2 PG (ref 24–34)
MCHC RBC AUTO-ENTMCNC: 30.6 G/DL (ref 31–37)
MCV RBC AUTO: 92.1 FL (ref 78–100)
MONOCYTES # BLD: 0.3 K/UL (ref 0.05–1.2)
MONOCYTES NFR BLD: 4 % (ref 3–10)
NEUTS BAND NFR BLD MANUAL: 2 % (ref 0–5)
NEUTS SEG # BLD: 6.7 K/UL (ref 1.8–8)
NEUTS SEG NFR BLD: 85 % (ref 40–73)
NRBC # BLD: 1.71 K/UL (ref 0–0.01)
NRBC BLD-RTO: 22.2 PER 100 WBC
PHOSPHATE SERPL-MCNC: 3.9 MG/DL (ref 2.5–4.9)
PLATELET # BLD AUTO: 420 K/UL (ref 135–420)
PLATELET COMMENT: ABNORMAL
PMV BLD AUTO: 9.5 FL (ref 9.2–11.8)
POTASSIUM SERPL-SCNC: 4.1 MMOL/L (ref 3.5–5.5)
RBC # BLD AUTO: 2.91 M/UL (ref 4.2–5.3)
RBC MORPH BLD: ABNORMAL
RBC MORPH BLD: ABNORMAL
SODIUM SERPL-SCNC: 137 MMOL/L (ref 136–145)
TIBC SERPL-MCNC: 295 UG/DL (ref 250–450)
WBC # BLD AUTO: 7.7 K/UL (ref 4.6–13.2)

## 2023-08-08 PROCEDURE — 6370000000 HC RX 637 (ALT 250 FOR IP): Performed by: INTERNAL MEDICINE

## 2023-08-08 PROCEDURE — 6360000002 HC RX W HCPCS: Performed by: INTERNAL MEDICINE

## 2023-08-08 PROCEDURE — 80069 RENAL FUNCTION PANEL: CPT

## 2023-08-08 PROCEDURE — 6360000002 HC RX W HCPCS: Performed by: EMERGENCY MEDICINE

## 2023-08-08 PROCEDURE — 99232 SBSQ HOSP IP/OBS MODERATE 35: CPT | Performed by: INTERNAL MEDICINE

## 2023-08-08 PROCEDURE — 1100000003 HC PRIVATE W/ TELEMETRY

## 2023-08-08 PROCEDURE — 36415 COLL VENOUS BLD VENIPUNCTURE: CPT

## 2023-08-08 PROCEDURE — 2700000000 HC OXYGEN THERAPY PER DAY

## 2023-08-08 PROCEDURE — 83540 ASSAY OF IRON: CPT

## 2023-08-08 PROCEDURE — 90935 HEMODIALYSIS ONE EVALUATION: CPT

## 2023-08-08 PROCEDURE — 82962 GLUCOSE BLOOD TEST: CPT

## 2023-08-08 PROCEDURE — 6360000002 HC RX W HCPCS: Performed by: PHYSICIAN ASSISTANT

## 2023-08-08 PROCEDURE — 2500000003 HC RX 250 WO HCPCS: Performed by: INTERNAL MEDICINE

## 2023-08-08 PROCEDURE — 94761 N-INVAS EAR/PLS OXIMETRY MLT: CPT

## 2023-08-08 PROCEDURE — 93010 ELECTROCARDIOGRAM REPORT: CPT | Performed by: INTERNAL MEDICINE

## 2023-08-08 PROCEDURE — 82728 ASSAY OF FERRITIN: CPT

## 2023-08-08 PROCEDURE — 83550 IRON BINDING TEST: CPT

## 2023-08-08 PROCEDURE — 94640 AIRWAY INHALATION TREATMENT: CPT

## 2023-08-08 PROCEDURE — 85025 COMPLETE CBC W/AUTO DIFF WBC: CPT

## 2023-08-08 PROCEDURE — 6370000000 HC RX 637 (ALT 250 FOR IP): Performed by: PHYSICIAN ASSISTANT

## 2023-08-08 RX ORDER — POLYETHYLENE GLYCOL 3350 17 G/17G
17 POWDER, FOR SOLUTION ORAL DAILY
Status: DISCONTINUED | OUTPATIENT
Start: 2023-08-08 | End: 2023-08-09

## 2023-08-08 RX ADMIN — NEPHROCAP 1 MG: 1 CAP ORAL at 09:49

## 2023-08-08 RX ADMIN — HYDRALAZINE HYDROCHLORIDE 25 MG: 25 TABLET, FILM COATED ORAL at 05:29

## 2023-08-08 RX ADMIN — BUDESONIDE 1 MG: 1 SUSPENSION RESPIRATORY (INHALATION) at 07:12

## 2023-08-08 RX ADMIN — PANTOPRAZOLE SODIUM 40 MG: 40 TABLET, DELAYED RELEASE ORAL at 05:29

## 2023-08-08 RX ADMIN — ARFORMOTEROL TARTRATE 15 MCG: 15 SOLUTION RESPIRATORY (INHALATION) at 07:12

## 2023-08-08 RX ADMIN — HYDROMORPHONE HYDROCHLORIDE 0.5 MG: 1 INJECTION, SOLUTION INTRAMUSCULAR; INTRAVENOUS; SUBCUTANEOUS at 04:07

## 2023-08-08 RX ADMIN — BUMETANIDE 1 MG: 0.25 INJECTION INTRAMUSCULAR; INTRAVENOUS at 09:49

## 2023-08-08 RX ADMIN — ASPIRIN 81 MG: 81 TABLET, CHEWABLE ORAL at 09:50

## 2023-08-08 RX ADMIN — BUDESONIDE 1 MG: 1 SUSPENSION RESPIRATORY (INHALATION) at 20:20

## 2023-08-08 RX ADMIN — ATORVASTATIN CALCIUM 40 MG: 40 TABLET, FILM COATED ORAL at 21:51

## 2023-08-08 RX ADMIN — BUMETANIDE 1 MG: 0.25 INJECTION INTRAMUSCULAR; INTRAVENOUS at 21:52

## 2023-08-08 RX ADMIN — GABAPENTIN 100 MG: 100 CAPSULE ORAL at 09:49

## 2023-08-08 RX ADMIN — ISOSORBIDE MONONITRATE 30 MG: 30 TABLET, EXTENDED RELEASE ORAL at 09:49

## 2023-08-08 RX ADMIN — ARFORMOTEROL TARTRATE 15 MCG: 15 SOLUTION RESPIRATORY (INHALATION) at 20:20

## 2023-08-08 RX ADMIN — GUAIFENESIN 600 MG: 600 TABLET, EXTENDED RELEASE ORAL at 21:51

## 2023-08-08 RX ADMIN — GUAIFENESIN 600 MG: 600 TABLET, EXTENDED RELEASE ORAL at 09:49

## 2023-08-08 RX ADMIN — Medication 5 MG: at 21:51

## 2023-08-08 RX ADMIN — PREDNISONE 40 MG: 20 TABLET ORAL at 16:06

## 2023-08-08 RX ADMIN — HYDRALAZINE HYDROCHLORIDE 25 MG: 25 TABLET, FILM COATED ORAL at 21:51

## 2023-08-08 RX ADMIN — EPOETIN ALFA-EPBX 5000 UNITS: 10000 INJECTION, SOLUTION INTRAVENOUS; SUBCUTANEOUS at 21:52

## 2023-08-08 RX ADMIN — GABAPENTIN 100 MG: 100 CAPSULE ORAL at 21:51

## 2023-08-08 RX ADMIN — ACETAMINOPHEN 325MG 650 MG: 325 TABLET ORAL at 16:05

## 2023-08-08 RX ADMIN — POLYETHYLENE GLYCOL 3350 17 G: 17 POWDER, FOR SOLUTION ORAL at 22:04

## 2023-08-08 ASSESSMENT — PAIN DESCRIPTION - LOCATION: LOCATION: ABDOMEN

## 2023-08-08 ASSESSMENT — PAIN SCALES - GENERAL
PAINLEVEL_OUTOF10: 0
PAINLEVEL_OUTOF10: 10

## 2023-08-08 ASSESSMENT — PAIN DESCRIPTION - DESCRIPTORS: DESCRIPTORS: ACHING

## 2023-08-08 ASSESSMENT — PAIN DESCRIPTION - ORIENTATION: ORIENTATION: UPPER

## 2023-08-08 NOTE — DIALYSIS
HD Care plan  Time: 3 hrs  Dialysate: 2 K   2.5  Ca  Bath  Net UF: 2L  Access: Aseptically care for MELL AVF  Hemodynamic stability: Maintain BP WNL    Pre Dialysis:  Pt received from Unit Nurse Bhargav Duarte pt on a bed/Stretcher ,A+O X 4, No s/s of distress noted  on Oxygen 2 L via NC, SpO2 99% . MELL AVF assessed no abnormalities  noted ,bruit and Thrill strong, AVF accessed using 15 G Needles. Without any difficulty  Good flows achieved from both needles. Intra Dialysis: Tx initiated at 1015. AVF flowing with ease. For hemodynamic stability UF goal  set  at 2000 ml. Pt offered assistance with repositioning every 2 hours/prn    Vascular access visible  and line connections remained intact throughout entire duration of treatment. Vital signs checked every 15 mins, WNL    Post Dialysis: Tx completed at 1315,   Tolerated well ,2L removed. De-accessed per protocol. Clot time 5 minutes for arterial, and 5 minutes for venous. Dry dressings applied. Bruit/Thrill present above and below dressings. Post Dialysis report given to unit  Nurse Kristan Butterfield.

## 2023-08-08 NOTE — PROGRESS NOTES
1925   Bedside and Verbal shift change report given to LISETTE Cisneros (oncoming nurse) by Trevor Thompson (offgoing nurse). Report included the following information Nurse Handoff Report, Intake/Output, MAR, and Quality Measures. Patient received complete bed bath. Arita catheter care provided and CHG bath completed. 0710  Bedside and Verbal shift change report given to Trevor Thompson (oncoming nurse) by Argelia Alcantar (offgoing nurse). Report included the following information Nurse Handoff Report, Intake/Output, MAR, and Quality Measures.

## 2023-08-08 NOTE — PROGRESS NOTES
RENAL DAILY PROGRESS NOTE    57-year-old female with past medical history of diabetes, congestive heart failure, COPD, severe CKD admitted for hypoxic respiratory failure following for renal failure  Subjective:     Complaint:    Overnight events noted  Received dialysis yesterday UF about 2.5 L  Urine output about 1.3 L overnight      IMPRESSION:   Acute on chronic kidney injury, required dialysis support for hyperkalemia and fluid management  Access left arm AV graft  Hyperkalemia, corrected  Congestive heart failure, systolic and diastolic, need further cardiac work-up  Anemia  Diabetes   PLAN:   Ms. Ricardo Navarrete had a life-threatening electrolyte imbalance and hypoxia on admission required urgent dialysis support. She  needs cardiac cath. She had good urine output, her creatinine is relatively lower side but in my opinion she needs ongoing dialysis support for better electrolytes and volume management. Plan for dialysis today with 3 hours UF goal about 2 L. Continue diuretics. Order anemia panel     Case discussed with nursing staff and cardiology colleague. At 10:48 AM on 8/8/2023, I saw and examined patient during hemodialysis treatment. The patient was receiving hemodialysis for treatment of  renal failure. I have also reviewed vital signs, intake and output, lab results and recent events, and agreed with today's dialysis order.       Current Facility-Administered Medications   Medication Dose Route Frequency    aspirin chewable tablet 81 mg  81 mg Oral Daily    isosorbide mononitrate (IMDUR) extended release tablet 30 mg  30 mg Oral Daily    atorvastatin (LIPITOR) tablet 40 mg  40 mg Oral Nightly    Virt-Caps 1 mg  1 capsule Oral Daily    predniSONE (DELTASONE) tablet 40 mg  40 mg Oral BID with meals    pantoprazole (PROTONIX) tablet 40 mg  40 mg Oral QAM AC    ipratropium 0.5 mg-albuterol 2.5 mg (DUONEB) nebulizer solution 1 Dose  1 Dose Inhalation Q4H PRN    glucose chewable tablet 16 g  4 tablet records for all procedures, Xrays and details which were not copied into this note but were reviewed prior to creation of Plan          Assessment & Plan:       As above       Sabrina Tsai MD  8/8/2023  9:36 AM                \

## 2023-08-08 NOTE — PROGRESS NOTES
Cardiac catheterization planned for 8/9 per cath lab. Patient and  notified of change of plan. Tolerating PO intake. Arita catheter draining yellow urine. Alert and oriented x 3. Forgetful. Fall precautions reiterated. L arm precautions continued.

## 2023-08-08 NOTE — PROGRESS NOTES
at bedside. Metoprolol held per in hemodialysis this AM. Alert and oriented to person, place, and time. Reoriented to situation. Disimpacted per impaction with hard and soft stool in rectal vault. Reports relief after digital disimpaction.

## 2023-08-09 PROBLEM — R06.03 RESPIRATORY DISTRESS: Status: ACTIVE | Noted: 2023-08-09

## 2023-08-09 LAB
ALBUMIN SERPL-MCNC: 3 G/DL (ref 3.4–5)
ANION GAP SERPL CALC-SCNC: 7 MMOL/L (ref 3–18)
BASOPHILS # BLD: 0 K/UL (ref 0–0.1)
BASOPHILS NFR BLD: 0 % (ref 0–2)
BUN SERPL-MCNC: 41 MG/DL (ref 7–18)
BUN/CREAT SERPL: 15 (ref 12–20)
CALCIUM SERPL-MCNC: 8 MG/DL (ref 8.5–10.1)
CHLORIDE SERPL-SCNC: 103 MMOL/L (ref 100–111)
CO2 SERPL-SCNC: 28 MMOL/L (ref 21–32)
CREAT SERPL-MCNC: 2.74 MG/DL (ref 0.6–1.3)
DIFFERENTIAL METHOD BLD: ABNORMAL
ECHO BSA: 2.08 M2
EOSINOPHIL # BLD: 0 K/UL (ref 0–0.4)
EOSINOPHIL NFR BLD: 0 % (ref 0–5)
ERYTHROCYTE [DISTWIDTH] IN BLOOD BY AUTOMATED COUNT: 16.5 % (ref 11.6–14.5)
GLUCOSE BLD STRIP.AUTO-MCNC: 145 MG/DL (ref 70–110)
GLUCOSE BLD STRIP.AUTO-MCNC: 176 MG/DL (ref 70–110)
GLUCOSE BLD STRIP.AUTO-MCNC: 191 MG/DL (ref 70–110)
GLUCOSE BLD STRIP.AUTO-MCNC: 195 MG/DL (ref 70–110)
GLUCOSE BLD STRIP.AUTO-MCNC: 201 MG/DL (ref 70–110)
GLUCOSE BLD STRIP.AUTO-MCNC: 357 MG/DL (ref 70–110)
GLUCOSE SERPL-MCNC: 205 MG/DL (ref 74–99)
HCT VFR BLD AUTO: 27.9 % (ref 35–45)
HGB BLD-MCNC: 8.4 G/DL (ref 12–16)
IMM GRANULOCYTES # BLD AUTO: 0.4 K/UL (ref 0–0.04)
IMM GRANULOCYTES NFR BLD AUTO: 5 % (ref 0–0.5)
LYMPHOCYTES # BLD: 0.7 K/UL (ref 0.9–3.6)
LYMPHOCYTES NFR BLD: 8 % (ref 21–52)
MCH RBC QN AUTO: 27.9 PG (ref 24–34)
MCHC RBC AUTO-ENTMCNC: 30.1 G/DL (ref 31–37)
MCV RBC AUTO: 92.7 FL (ref 78–100)
MONOCYTES # BLD: 0.7 K/UL (ref 0.05–1.2)
MONOCYTES NFR BLD: 8 % (ref 3–10)
NEUTS SEG # BLD: 6.8 K/UL (ref 1.8–8)
NEUTS SEG NFR BLD: 79 % (ref 40–73)
NRBC # BLD: 0.85 K/UL (ref 0–0.01)
NRBC BLD-RTO: 9.9 PER 100 WBC
PHOSPHATE SERPL-MCNC: 3.3 MG/DL (ref 2.5–4.9)
PLATELET # BLD AUTO: 418 K/UL (ref 135–420)
PMV BLD AUTO: 9.8 FL (ref 9.2–11.8)
POTASSIUM SERPL-SCNC: 3.6 MMOL/L (ref 3.5–5.5)
RBC # BLD AUTO: 3.01 M/UL (ref 4.2–5.3)
SODIUM SERPL-SCNC: 138 MMOL/L (ref 136–145)
WBC # BLD AUTO: 8.6 K/UL (ref 4.6–13.2)

## 2023-08-09 PROCEDURE — 99153 MOD SED SAME PHYS/QHP EA: CPT | Performed by: INTERNAL MEDICINE

## 2023-08-09 PROCEDURE — 2500000003 HC RX 250 WO HCPCS: Performed by: INTERNAL MEDICINE

## 2023-08-09 PROCEDURE — 85025 COMPLETE CBC W/AUTO DIFF WBC: CPT

## 2023-08-09 PROCEDURE — 97166 OT EVAL MOD COMPLEX 45 MIN: CPT

## 2023-08-09 PROCEDURE — 99152 MOD SED SAME PHYS/QHP 5/>YRS: CPT | Performed by: INTERNAL MEDICINE

## 2023-08-09 PROCEDURE — C1713 ANCHOR/SCREW BN/BN,TIS/BN: HCPCS | Performed by: INTERNAL MEDICINE

## 2023-08-09 PROCEDURE — 6360000002 HC RX W HCPCS: Performed by: INTERNAL MEDICINE

## 2023-08-09 PROCEDURE — 36415 COLL VENOUS BLD VENIPUNCTURE: CPT

## 2023-08-09 PROCEDURE — 94761 N-INVAS EAR/PLS OXIMETRY MLT: CPT

## 2023-08-09 PROCEDURE — 94640 AIRWAY INHALATION TREATMENT: CPT

## 2023-08-09 PROCEDURE — 80069 RENAL FUNCTION PANEL: CPT

## 2023-08-09 PROCEDURE — 2700000000 HC OXYGEN THERAPY PER DAY

## 2023-08-09 PROCEDURE — C1769 GUIDE WIRE: HCPCS | Performed by: INTERNAL MEDICINE

## 2023-08-09 PROCEDURE — 76000 FLUOROSCOPY <1 HR PHYS/QHP: CPT | Performed by: INTERNAL MEDICINE

## 2023-08-09 PROCEDURE — 93458 L HRT ARTERY/VENTRICLE ANGIO: CPT | Performed by: INTERNAL MEDICINE

## 2023-08-09 PROCEDURE — 4A023N7 MEASUREMENT OF CARDIAC SAMPLING AND PRESSURE, LEFT HEART, PERCUTANEOUS APPROACH: ICD-10-PCS | Performed by: INTERNAL MEDICINE

## 2023-08-09 PROCEDURE — 1100000003 HC PRIVATE W/ TELEMETRY

## 2023-08-09 PROCEDURE — 6360000002 HC RX W HCPCS: Performed by: PHYSICIAN ASSISTANT

## 2023-08-09 PROCEDURE — 6370000000 HC RX 637 (ALT 250 FOR IP): Performed by: PHYSICIAN ASSISTANT

## 2023-08-09 PROCEDURE — 6370000000 HC RX 637 (ALT 250 FOR IP): Performed by: HOSPITALIST

## 2023-08-09 PROCEDURE — 2709999900 HC NON-CHARGEABLE SUPPLY: Performed by: INTERNAL MEDICINE

## 2023-08-09 PROCEDURE — 6370000000 HC RX 637 (ALT 250 FOR IP): Performed by: INTERNAL MEDICINE

## 2023-08-09 PROCEDURE — 6360000004 HC RX CONTRAST MEDICATION: Performed by: INTERNAL MEDICINE

## 2023-08-09 PROCEDURE — 97535 SELF CARE MNGMENT TRAINING: CPT

## 2023-08-09 PROCEDURE — 2580000003 HC RX 258: Performed by: INTERNAL MEDICINE

## 2023-08-09 PROCEDURE — C1760 CLOSURE DEV, VASC: HCPCS | Performed by: INTERNAL MEDICINE

## 2023-08-09 PROCEDURE — 99232 SBSQ HOSP IP/OBS MODERATE 35: CPT | Performed by: INTERNAL MEDICINE

## 2023-08-09 PROCEDURE — C1894 INTRO/SHEATH, NON-LASER: HCPCS | Performed by: INTERNAL MEDICINE

## 2023-08-09 PROCEDURE — 82962 GLUCOSE BLOOD TEST: CPT

## 2023-08-09 PROCEDURE — 6360000002 HC RX W HCPCS: Performed by: EMERGENCY MEDICINE

## 2023-08-09 PROCEDURE — B2111ZZ FLUOROSCOPY OF MULTIPLE CORONARY ARTERIES USING LOW OSMOLAR CONTRAST: ICD-10-PCS | Performed by: INTERNAL MEDICINE

## 2023-08-09 RX ORDER — GABAPENTIN 400 MG/1
400 CAPSULE ORAL
Status: DISCONTINUED | OUTPATIENT
Start: 2023-08-09 | End: 2023-08-11 | Stop reason: HOSPADM

## 2023-08-09 RX ORDER — POLYETHYLENE GLYCOL 3350 17 G/17G
17 POWDER, FOR SOLUTION ORAL 2 TIMES DAILY
Status: DISCONTINUED | OUTPATIENT
Start: 2023-08-09 | End: 2023-08-11 | Stop reason: HOSPADM

## 2023-08-09 RX ORDER — HEPARIN SODIUM 1000 [USP'U]/ML
INJECTION, SOLUTION INTRAVENOUS; SUBCUTANEOUS
Status: DISPENSED
Start: 2023-08-09 | End: 2023-08-10

## 2023-08-09 RX ORDER — ACETAMINOPHEN 325 MG/1
650 TABLET ORAL EVERY 4 HOURS PRN
Status: DISCONTINUED | OUTPATIENT
Start: 2023-08-09 | End: 2023-08-11 | Stop reason: HOSPADM

## 2023-08-09 RX ORDER — FENTANYL CITRATE 50 UG/ML
INJECTION, SOLUTION INTRAMUSCULAR; INTRAVENOUS PRN
Status: DISCONTINUED | OUTPATIENT
Start: 2023-08-09 | End: 2023-08-09 | Stop reason: HOSPADM

## 2023-08-09 RX ORDER — HEPARIN SODIUM 200 [USP'U]/100ML
INJECTION, SOLUTION INTRAVENOUS
Status: DISPENSED
Start: 2023-08-09 | End: 2023-08-10

## 2023-08-09 RX ORDER — MIDAZOLAM HYDROCHLORIDE 1 MG/ML
INJECTION INTRAMUSCULAR; INTRAVENOUS PRN
Status: DISCONTINUED | OUTPATIENT
Start: 2023-08-09 | End: 2023-08-09 | Stop reason: HOSPADM

## 2023-08-09 RX ORDER — SODIUM CHLORIDE 0.9 % (FLUSH) 0.9 %
5-40 SYRINGE (ML) INJECTION PRN
Status: DISCONTINUED | OUTPATIENT
Start: 2023-08-09 | End: 2023-08-11 | Stop reason: HOSPADM

## 2023-08-09 RX ORDER — TIZANIDINE 4 MG/1
2 TABLET ORAL EVERY 12 HOURS PRN
Status: DISCONTINUED | OUTPATIENT
Start: 2023-08-09 | End: 2023-08-11 | Stop reason: HOSPADM

## 2023-08-09 RX ORDER — HEPARIN SODIUM 1000 [USP'U]/ML
INJECTION, SOLUTION INTRAVENOUS; SUBCUTANEOUS PRN
Status: DISCONTINUED | OUTPATIENT
Start: 2023-08-09 | End: 2023-08-09 | Stop reason: HOSPADM

## 2023-08-09 RX ORDER — BISACODYL 10 MG
10 SUPPOSITORY, RECTAL RECTAL DAILY PRN
Status: DISCONTINUED | OUTPATIENT
Start: 2023-08-09 | End: 2023-08-11 | Stop reason: HOSPADM

## 2023-08-09 RX ORDER — FENTANYL CITRATE 50 UG/ML
INJECTION, SOLUTION INTRAMUSCULAR; INTRAVENOUS
Status: DISPENSED
Start: 2023-08-09 | End: 2023-08-10

## 2023-08-09 RX ORDER — MIDAZOLAM HYDROCHLORIDE 1 MG/ML
INJECTION INTRAMUSCULAR; INTRAVENOUS
Status: DISPENSED
Start: 2023-08-09 | End: 2023-08-10

## 2023-08-09 RX ORDER — SODIUM CHLORIDE 0.9 % (FLUSH) 0.9 %
5-40 SYRINGE (ML) INJECTION EVERY 12 HOURS SCHEDULED
Status: DISCONTINUED | OUTPATIENT
Start: 2023-08-09 | End: 2023-08-11 | Stop reason: HOSPADM

## 2023-08-09 RX ORDER — GABAPENTIN 100 MG/1
100 CAPSULE ORAL DAILY
Status: DISCONTINUED | OUTPATIENT
Start: 2023-08-10 | End: 2023-08-11 | Stop reason: HOSPADM

## 2023-08-09 RX ORDER — ENEMA 19; 7 G/133ML; G/133ML
1 ENEMA RECTAL
Status: DISPENSED | OUTPATIENT
Start: 2023-08-09 | End: 2023-08-10

## 2023-08-09 RX ORDER — SODIUM CHLORIDE 9 MG/ML
INJECTION, SOLUTION INTRAVENOUS PRN
Status: DISCONTINUED | OUTPATIENT
Start: 2023-08-09 | End: 2023-08-11 | Stop reason: HOSPADM

## 2023-08-09 RX ADMIN — GUAIFENESIN 600 MG: 600 TABLET, EXTENDED RELEASE ORAL at 22:12

## 2023-08-09 RX ADMIN — GABAPENTIN 100 MG: 100 CAPSULE ORAL at 10:05

## 2023-08-09 RX ADMIN — PREDNISONE 40 MG: 20 TABLET ORAL at 16:50

## 2023-08-09 RX ADMIN — TIZANIDINE 2 MG: 4 TABLET ORAL at 22:11

## 2023-08-09 RX ADMIN — BUDESONIDE 1 MG: 1 SUSPENSION RESPIRATORY (INHALATION) at 07:50

## 2023-08-09 RX ADMIN — HYDRALAZINE HYDROCHLORIDE 25 MG: 25 TABLET, FILM COATED ORAL at 07:06

## 2023-08-09 RX ADMIN — INSULIN LISPRO 4 UNITS: 100 INJECTION, SOLUTION INTRAVENOUS; SUBCUTANEOUS at 22:10

## 2023-08-09 RX ADMIN — INSULIN GLARGINE 7 UNITS: 100 INJECTION, SOLUTION SUBCUTANEOUS at 22:11

## 2023-08-09 RX ADMIN — BUDESONIDE 1 MG: 1 SUSPENSION RESPIRATORY (INHALATION) at 20:38

## 2023-08-09 RX ADMIN — ISOSORBIDE MONONITRATE 30 MG: 30 TABLET, EXTENDED RELEASE ORAL at 10:05

## 2023-08-09 RX ADMIN — PANTOPRAZOLE SODIUM 40 MG: 40 TABLET, DELAYED RELEASE ORAL at 07:06

## 2023-08-09 RX ADMIN — SODIUM CHLORIDE, PRESERVATIVE FREE 10 ML: 5 INJECTION INTRAVENOUS at 22:12

## 2023-08-09 RX ADMIN — POLYETHYLENE GLYCOL 3350 17 G: 17 POWDER, FOR SOLUTION ORAL at 10:04

## 2023-08-09 RX ADMIN — METOPROLOL SUCCINATE 25 MG: 25 TABLET, EXTENDED RELEASE ORAL at 10:07

## 2023-08-09 RX ADMIN — ARFORMOTEROL TARTRATE 15 MCG: 15 SOLUTION RESPIRATORY (INHALATION) at 20:38

## 2023-08-09 RX ADMIN — ATORVASTATIN CALCIUM 40 MG: 40 TABLET, FILM COATED ORAL at 22:11

## 2023-08-09 RX ADMIN — HYDROMORPHONE HYDROCHLORIDE 0.5 MG: 1 INJECTION, SOLUTION INTRAMUSCULAR; INTRAVENOUS; SUBCUTANEOUS at 20:55

## 2023-08-09 RX ADMIN — POLYETHYLENE GLYCOL 3350 17 G: 17 POWDER, FOR SOLUTION ORAL at 22:12

## 2023-08-09 RX ADMIN — NEPHROCAP 1 MG: 1 CAP ORAL at 10:05

## 2023-08-09 RX ADMIN — ASPIRIN 81 MG: 81 TABLET, CHEWABLE ORAL at 10:06

## 2023-08-09 RX ADMIN — BUMETANIDE 1 MG: 0.25 INJECTION INTRAMUSCULAR; INTRAVENOUS at 22:12

## 2023-08-09 RX ADMIN — HYDRALAZINE HYDROCHLORIDE 25 MG: 25 TABLET, FILM COATED ORAL at 12:59

## 2023-08-09 RX ADMIN — BISACODYL 10 MG: 10 SUPPOSITORY RECTAL at 00:48

## 2023-08-09 RX ADMIN — BUMETANIDE 1 MG: 0.25 INJECTION INTRAMUSCULAR; INTRAVENOUS at 10:06

## 2023-08-09 RX ADMIN — GABAPENTIN 400 MG: 400 CAPSULE ORAL at 22:12

## 2023-08-09 RX ADMIN — HYDRALAZINE HYDROCHLORIDE 25 MG: 25 TABLET, FILM COATED ORAL at 22:11

## 2023-08-09 RX ADMIN — GUAIFENESIN 600 MG: 600 TABLET, EXTENDED RELEASE ORAL at 10:06

## 2023-08-09 RX ADMIN — ARFORMOTEROL TARTRATE 15 MCG: 15 SOLUTION RESPIRATORY (INHALATION) at 07:50

## 2023-08-09 RX ADMIN — PREDNISONE 40 MG: 20 TABLET ORAL at 10:06

## 2023-08-09 ASSESSMENT — PAIN DESCRIPTION - DESCRIPTORS
DESCRIPTORS: PRESSURE
DESCRIPTORS: ACHING

## 2023-08-09 ASSESSMENT — PAIN SCALES - GENERAL
PAINLEVEL_OUTOF10: 0
PAINLEVEL_OUTOF10: 10

## 2023-08-09 ASSESSMENT — PAIN DESCRIPTION - LOCATION
LOCATION: ABDOMEN
LOCATION: NECK

## 2023-08-09 ASSESSMENT — PAIN DESCRIPTION - PAIN TYPE: TYPE: OTHER (COMMENT)

## 2023-08-09 NOTE — PROGRESS NOTES
RENAL DAILY PROGRESS NOTE    81-year-old female with past medical history of diabetes, congestive heart failure, COPD, severe CKD admitted for hypoxic respiratory failure following for renal failure  Subjective:     Complaint:    Overnight events noted  Tolerated dialysis well, UF about 2.5L  Improving her breathing,  Complaining constipation     IMPRESSION:   Acute on chronic kidney injury, required dialysis support for hyperkalemia and fluid management  Access left arm AV graft  Hyperkalemia, corrected  Congestive heart failure, systolic and diastolic, need further cardiac work-up  Anemia  Diabetes   PLAN:   She is tolerating dialysis well, plan to continue dialysis support for volume and electrolyte management. Plan for cardiac cath noted, discussed with cardiology colleague, appreciate input. Adjust meds per ESRD status.             Current Facility-Administered Medications   Medication Dose Route Frequency    bisacodyl (DULCOLAX) suppository 10 mg  10 mg Rectal Daily PRN    sodium phosphate (FLEET) rectal enema 1 enema  1 enema Rectal Once PRN    polyethylene glycol (GLYCOLAX) packet 17 g  17 g Oral Daily    aspirin chewable tablet 81 mg  81 mg Oral Daily    isosorbide mononitrate (IMDUR) extended release tablet 30 mg  30 mg Oral Daily    atorvastatin (LIPITOR) tablet 40 mg  40 mg Oral Nightly    Virt-Caps 1 mg  1 capsule Oral Daily    predniSONE (DELTASONE) tablet 40 mg  40 mg Oral BID with meals    pantoprazole (PROTONIX) tablet 40 mg  40 mg Oral QAM AC    ipratropium 0.5 mg-albuterol 2.5 mg (DUONEB) nebulizer solution 1 Dose  1 Dose Inhalation Q4H PRN    glucose chewable tablet 16 g  4 tablet Oral PRN    HYDROmorphone (DILAUDID) injection 0.5 mg  0.5 mg IntraVENous Q4H PRN    naloxone (NARCAN) injection 0.4 mg  0.4 mg IntraVENous PRN    epoetin agnes-epbx (RETACRIT) injection 5,000 Units  5,000 Units SubCUTAneous Once per day on Tue Thu Sat    guaiFENesin [facility]) extended release tablet 600 mg  600

## 2023-08-09 NOTE — PROGRESS NOTES
Physical Therapy    Pt not seen for skilled PT due to:    []  Nausea/vomiting  []  Eating  []  Pain  []  Pt lethargic  []  Off Unit  Other: Pt politely declines to participate in PT eval at this time, reports she doesn't feel good, planned cardiac cath this date. Will f/u later as schedule allows. Thank you.   Cathy Sanchez, PT, DPT

## 2023-08-09 NOTE — PROGRESS NOTES
0015- Patient in bed crying \"I need to poop but it won't come out. You need to get it out\". Approximately 2 hours prior RN provided patient with Glycolax to help relieve constipation. RN called hospitalist Dr. Jazlyn Mittal to inform of patient situation. SBAR provided. Physician ordered PRN Dulcolax suppository and enema. 80- RN administered Dulcolax suppository. 0200- Patient had a medium bowel movement on bed pan. Patient verbalized some relief with bowel movement.

## 2023-08-09 NOTE — PROGRESS NOTES
I was asked by Dr. Harmony Gonzalez to evaluate the patient because she is going to undergo a cardiac procedures. There was a concern that the patient had some fluid collection on the CT scan and they want to make sure that she does not need any urgent surgical intervention. I know the patient very well. She has multiple medical conditions and I did take her colostomy down last month. She states that she is doing well and she has been having normal bowel movements and tolerating a regular diet but she presented with shortness of breath. She states that she fell in the restroom and since then she has been having left lower extremity pain. I did review her CT scan and it has not changed from the CT scan that was done last month. There is no evidence of any obstruction but there is some fecalization of the small bowel and the colon consistent with chronic constipation. She could have some stricture at the anastomotic line but she is tolerating regular diet and having bowel movements.   There is no need for any urgent surgical intervention and it is okay to proceed with the cardiac procedure if needed

## 2023-08-09 NOTE — PROGRESS NOTES
TRANSFER - OUT REPORT:    Verbal report given to Unknown LISETTE Armendariz on Allyson Guaman  being transferred to [phone] for routine progression of patient care       Report consisted of patient's Situation, Background, Assessment and   Recommendations(SBAR). Information from the following report(s) Nurse Handoff Report was reviewed with the receiving nurse. Lines:   Peripheral IV 08/03/23 Right;Lateral Antecubital (Active)   Site Assessment Clean, dry & intact 08/09/23 0400   Line Status Capped 08/09/23 0400   Line Care Connections checked and tightened;Cap changed 08/09/23 0400   Phlebitis Assessment No symptoms 08/09/23 0400   Infiltration Assessment 0 08/09/23 0400   Alcohol Cap Used Yes 08/09/23 0400   Dressing Status Clean, dry & intact 08/09/23 0400   Dressing Type Transparent 08/09/23 0400        Opportunity for questions and clarification was provided.       Trevin Roberts RN    Patient transported with:  Tech: Transporter

## 2023-08-10 LAB
ANION GAP SERPL CALC-SCNC: 8 MMOL/L (ref 3–18)
BACTERIA SPEC CULT: NORMAL
BACTERIA SPEC CULT: NORMAL
BASOPHILS # BLD: 0 K/UL (ref 0–0.1)
BASOPHILS NFR BLD: 0 % (ref 0–2)
BUN SERPL-MCNC: 59 MG/DL (ref 7–18)
BUN/CREAT SERPL: 21 (ref 12–20)
CALCIUM SERPL-MCNC: 8.1 MG/DL (ref 8.5–10.1)
CHLORIDE SERPL-SCNC: 100 MMOL/L (ref 100–111)
CO2 SERPL-SCNC: 27 MMOL/L (ref 21–32)
CREAT SERPL-MCNC: 2.86 MG/DL (ref 0.6–1.3)
DIFFERENTIAL METHOD BLD: ABNORMAL
EOSINOPHIL # BLD: 0 K/UL (ref 0–0.4)
EOSINOPHIL NFR BLD: 0 % (ref 0–5)
ERYTHROCYTE [DISTWIDTH] IN BLOOD BY AUTOMATED COUNT: 16.6 % (ref 11.6–14.5)
GLUCOSE BLD STRIP.AUTO-MCNC: 189 MG/DL (ref 70–110)
GLUCOSE BLD STRIP.AUTO-MCNC: 208 MG/DL (ref 70–110)
GLUCOSE BLD STRIP.AUTO-MCNC: 286 MG/DL (ref 70–110)
GLUCOSE SERPL-MCNC: 173 MG/DL (ref 74–99)
HCT VFR BLD AUTO: 25.8 % (ref 35–45)
HGB BLD-MCNC: 7.6 G/DL (ref 12–16)
IMM GRANULOCYTES # BLD AUTO: 0.2 K/UL (ref 0–0.04)
IMM GRANULOCYTES NFR BLD AUTO: 3 % (ref 0–0.5)
LYMPHOCYTES # BLD: 0.6 K/UL (ref 0.9–3.6)
LYMPHOCYTES NFR BLD: 9 % (ref 21–52)
MCH RBC QN AUTO: 27.8 PG (ref 24–34)
MCHC RBC AUTO-ENTMCNC: 29.5 G/DL (ref 31–37)
MCV RBC AUTO: 94.5 FL (ref 78–100)
MONOCYTES # BLD: 0.7 K/UL (ref 0.05–1.2)
MONOCYTES NFR BLD: 10 % (ref 3–10)
NEUTS SEG # BLD: 5.1 K/UL (ref 1.8–8)
NEUTS SEG NFR BLD: 77 % (ref 40–73)
NRBC # BLD: 0.56 K/UL (ref 0–0.01)
NRBC BLD-RTO: 8.6 PER 100 WBC
PLATELET # BLD AUTO: 364 K/UL (ref 135–420)
PMV BLD AUTO: 9.7 FL (ref 9.2–11.8)
POTASSIUM SERPL-SCNC: 4.5 MMOL/L (ref 3.5–5.5)
RBC # BLD AUTO: 2.73 M/UL (ref 4.2–5.3)
SERVICE CMNT-IMP: NORMAL
SERVICE CMNT-IMP: NORMAL
SODIUM SERPL-SCNC: 135 MMOL/L (ref 136–145)
WBC # BLD AUTO: 6.5 K/UL (ref 4.6–13.2)

## 2023-08-10 PROCEDURE — 2500000003 HC RX 250 WO HCPCS: Performed by: INTERNAL MEDICINE

## 2023-08-10 PROCEDURE — 2580000003 HC RX 258: Performed by: INTERNAL MEDICINE

## 2023-08-10 PROCEDURE — 82962 GLUCOSE BLOOD TEST: CPT

## 2023-08-10 PROCEDURE — 6360000002 HC RX W HCPCS: Performed by: INTERNAL MEDICINE

## 2023-08-10 PROCEDURE — 6360000002 HC RX W HCPCS: Performed by: PHYSICIAN ASSISTANT

## 2023-08-10 PROCEDURE — 97535 SELF CARE MNGMENT TRAINING: CPT

## 2023-08-10 PROCEDURE — 6370000000 HC RX 637 (ALT 250 FOR IP): Performed by: INTERNAL MEDICINE

## 2023-08-10 PROCEDURE — 36415 COLL VENOUS BLD VENIPUNCTURE: CPT

## 2023-08-10 PROCEDURE — 94761 N-INVAS EAR/PLS OXIMETRY MLT: CPT

## 2023-08-10 PROCEDURE — 6370000000 HC RX 637 (ALT 250 FOR IP): Performed by: PHYSICIAN ASSISTANT

## 2023-08-10 PROCEDURE — 85025 COMPLETE CBC W/AUTO DIFF WBC: CPT

## 2023-08-10 PROCEDURE — 97530 THERAPEUTIC ACTIVITIES: CPT

## 2023-08-10 PROCEDURE — 2700000000 HC OXYGEN THERAPY PER DAY

## 2023-08-10 PROCEDURE — 97161 PT EVAL LOW COMPLEX 20 MIN: CPT

## 2023-08-10 PROCEDURE — 86704 HEP B CORE ANTIBODY TOTAL: CPT

## 2023-08-10 PROCEDURE — 99232 SBSQ HOSP IP/OBS MODERATE 35: CPT | Performed by: INTERNAL MEDICINE

## 2023-08-10 PROCEDURE — 97116 GAIT TRAINING THERAPY: CPT

## 2023-08-10 PROCEDURE — 1100000003 HC PRIVATE W/ TELEMETRY

## 2023-08-10 PROCEDURE — 90935 HEMODIALYSIS ONE EVALUATION: CPT

## 2023-08-10 PROCEDURE — 80048 BASIC METABOLIC PNL TOTAL CA: CPT

## 2023-08-10 PROCEDURE — 94640 AIRWAY INHALATION TREATMENT: CPT

## 2023-08-10 RX ORDER — PREDNISONE 20 MG/1
40 TABLET ORAL DAILY
Status: DISCONTINUED | OUTPATIENT
Start: 2023-08-11 | End: 2023-08-11 | Stop reason: HOSPADM

## 2023-08-10 RX ADMIN — BUMETANIDE 1 MG: 0.25 INJECTION INTRAMUSCULAR; INTRAVENOUS at 23:02

## 2023-08-10 RX ADMIN — ARFORMOTEROL TARTRATE 15 MCG: 15 SOLUTION RESPIRATORY (INHALATION) at 20:32

## 2023-08-10 RX ADMIN — SODIUM CHLORIDE, PRESERVATIVE FREE 10 ML: 5 INJECTION INTRAVENOUS at 23:07

## 2023-08-10 RX ADMIN — ISOSORBIDE MONONITRATE 30 MG: 30 TABLET, EXTENDED RELEASE ORAL at 08:18

## 2023-08-10 RX ADMIN — BUDESONIDE 1 MG: 1 SUSPENSION RESPIRATORY (INHALATION) at 10:10

## 2023-08-10 RX ADMIN — GUAIFENESIN 600 MG: 600 TABLET, EXTENDED RELEASE ORAL at 08:18

## 2023-08-10 RX ADMIN — NEPHROCAP 1 MG: 1 CAP ORAL at 08:19

## 2023-08-10 RX ADMIN — GUAIFENESIN 600 MG: 600 TABLET, EXTENDED RELEASE ORAL at 23:02

## 2023-08-10 RX ADMIN — SODIUM CHLORIDE, PRESERVATIVE FREE 10 ML: 5 INJECTION INTRAVENOUS at 08:20

## 2023-08-10 RX ADMIN — POLYETHYLENE GLYCOL 3350 17 G: 17 POWDER, FOR SOLUTION ORAL at 23:17

## 2023-08-10 RX ADMIN — ARFORMOTEROL TARTRATE 15 MCG: 15 SOLUTION RESPIRATORY (INHALATION) at 10:10

## 2023-08-10 RX ADMIN — GABAPENTIN 400 MG: 400 CAPSULE ORAL at 23:02

## 2023-08-10 RX ADMIN — INSULIN LISPRO 2 UNITS: 100 INJECTION, SOLUTION INTRAVENOUS; SUBCUTANEOUS at 12:07

## 2023-08-10 RX ADMIN — EPOETIN ALFA-EPBX 5000 UNITS: 10000 INJECTION, SOLUTION INTRAVENOUS; SUBCUTANEOUS at 23:03

## 2023-08-10 RX ADMIN — PREDNISONE 40 MG: 20 TABLET ORAL at 08:18

## 2023-08-10 RX ADMIN — BUMETANIDE 1 MG: 0.25 INJECTION INTRAMUSCULAR; INTRAVENOUS at 08:19

## 2023-08-10 RX ADMIN — ASPIRIN 81 MG: 81 TABLET, CHEWABLE ORAL at 08:19

## 2023-08-10 RX ADMIN — TIZANIDINE 2 MG: 4 TABLET ORAL at 23:20

## 2023-08-10 RX ADMIN — HYDRALAZINE HYDROCHLORIDE 25 MG: 25 TABLET, FILM COATED ORAL at 23:02

## 2023-08-10 RX ADMIN — GABAPENTIN 100 MG: 100 CAPSULE ORAL at 08:26

## 2023-08-10 RX ADMIN — INSULIN GLARGINE 7 UNITS: 100 INJECTION, SOLUTION SUBCUTANEOUS at 23:06

## 2023-08-10 RX ADMIN — ACETAMINOPHEN 325MG 650 MG: 325 TABLET ORAL at 12:07

## 2023-08-10 RX ADMIN — ATORVASTATIN CALCIUM 40 MG: 40 TABLET, FILM COATED ORAL at 23:02

## 2023-08-10 RX ADMIN — BUDESONIDE 1 MG: 1 SUSPENSION RESPIRATORY (INHALATION) at 20:32

## 2023-08-10 RX ADMIN — METOPROLOL SUCCINATE 25 MG: 25 TABLET, EXTENDED RELEASE ORAL at 08:19

## 2023-08-10 RX ADMIN — POLYETHYLENE GLYCOL 3350 17 G: 17 POWDER, FOR SOLUTION ORAL at 08:19

## 2023-08-10 RX ADMIN — TIZANIDINE 2 MG: 4 TABLET ORAL at 08:26

## 2023-08-10 RX ADMIN — Medication 5 MG: at 23:02

## 2023-08-10 ASSESSMENT — PAIN SCALES - GENERAL
PAINLEVEL_OUTOF10: 0
PAINLEVEL_OUTOF10: 3
PAINLEVEL_OUTOF10: 0

## 2023-08-10 ASSESSMENT — PAIN DESCRIPTION - LOCATION: LOCATION: HEAD

## 2023-08-10 ASSESSMENT — PAIN DESCRIPTION - DESCRIPTORS: DESCRIPTORS: ACHING

## 2023-08-10 NOTE — PLAN OF CARE
Problem: Discharge Planning  Goal: Discharge to home or other facility with appropriate resources  Outcome: Progressing  Flowsheets (Taken 8/10/2023 0800)  Discharge to home or other facility with appropriate resources: Identify barriers to discharge with patient and caregiver     Problem: Safety - Adult  Goal: Free from fall injury  Outcome: Progressing     Problem: Pain  Goal: Verbalizes/displays adequate comfort level or baseline comfort level  Outcome: Progressing     Problem: Chronic Conditions and Co-morbidities  Goal: Patient's chronic conditions and co-morbidity symptoms are monitored and maintained or improved  Outcome: Progressing  Flowsheets (Taken 8/10/2023 0800)  Care Plan - Patient's Chronic Conditions and Co-Morbidity Symptoms are Monitored and Maintained or Improved: Monitor and assess patient's chronic conditions and comorbid symptoms for stability, deterioration, or improvement     Problem: Neurosensory - Adult  Goal: Achieves stable or improved neurological status  Outcome: Progressing  Flowsheets (Taken 8/10/2023 0800)  Achieves stable or improved neurological status: Assess for and report changes in neurological status  Goal: Achieves maximal functionality and self care  Outcome: Progressing     Problem: Respiratory - Adult  Goal: Achieves optimal ventilation and oxygenation  Outcome: Progressing  Flowsheets (Taken 8/10/2023 0800)  Achieves optimal ventilation and oxygenation: Assess for changes in respiratory status     Problem: Cardiovascular - Adult  Goal: Maintains optimal cardiac output and hemodynamic stability  Outcome: Progressing  Flowsheets (Taken 8/10/2023 0800)  Maintains optimal cardiac output and hemodynamic stability: Monitor blood pressure and heart rate  Goal: Absence of cardiac dysrhythmias or at baseline  Outcome: Progressing  Flowsheets (Taken 8/10/2023 0800)  Absence of cardiac dysrhythmias or at baseline: Monitor cardiac rate and rhythm     Problem: Skin/Tissue Integrity - Adult  Goal: Skin integrity remains intact  Outcome: Progressing  Flowsheets (Taken 8/10/2023 0800)  Skin Integrity Remains Intact: Monitor for areas of redness and/or skin breakdown  Goal: Incisions, wounds, or drain sites healing without S/S of infection  Outcome: Progressing  Goal: Oral mucous membranes remain intact  Outcome: Progressing     Problem: Musculoskeletal - Adult  Goal: Return mobility to safest level of function  Outcome: Progressing  Flowsheets (Taken 8/10/2023 0800)  Return Mobility to Safest Level of Function: Assess patient stability and activity tolerance for standing, transferring and ambulating with or without assistive devices  Goal: Maintain proper alignment of affected body part  Outcome: Progressing  Goal: Return ADL status to a safe level of function  Outcome: Progressing     Problem: Gastrointestinal - Adult  Goal: Minimal or absence of nausea and vomiting  Outcome: Progressing  Goal: Maintains or returns to baseline bowel function  Outcome: Progressing  Goal: Maintains adequate nutritional intake  Outcome: Progressing  Goal: Establish and maintain optimal ostomy function  Outcome: Progressing     Problem: Genitourinary - Adult  Goal: Absence of urinary retention  Outcome: Progressing  Flowsheets (Taken 8/10/2023 0800)  Absence of urinary retention: Monitor intake/output and perform bladder scan as needed     Problem: Infection - Adult  Goal: Absence of infection at discharge  Outcome: Progressing  Flowsheets (Taken 8/10/2023 0800)  Absence of infection at discharge: Assess and monitor for signs and symptoms of infection     Problem: Metabolic/Fluid and Electrolytes - Adult  Goal: Electrolytes maintained within normal limits  Outcome: Progressing  Flowsheets (Taken 8/10/2023 0800)  Electrolytes maintained within normal limits: Monitor labs and assess patient for signs and symptoms of electrolyte imbalances  Goal: Hemodynamic stability and optimal renal function maintained  Outcome:

## 2023-08-10 NOTE — PLAN OF CARE
INTERVENTION:  HEMODYNAMIC STABILIZATION  MAINTAIN BP WNL WHILE ON HD. INTERVENTION:  FLUID MANAGEMENT  WILL ATTEMPT 2000 ML TOTAL FLUID REMOVAL AS TOLERATED. INTERVENTION:  METABOLIC/ELECTROLYTE MANAGEMENT  2.0 POTASSIUM 2.5 CALCIUM DIALYSATE USED WITH HD TODAY. INTERVENTION:  HEMODIALYSIS ACCESS SITE MANAGEMENT  LEFT FOREARM AVF ACCESSED WITH 16G NEEDLE USING ASEPTIC TECHNIQUE. GOAL:  SIGNS AND SYMPTOMS OF LISTED POTENTIAL PROBLEMS WILL BE ABSENT OR MANAGEABLE. OUTCOME:  PROGRESSING. HD PLANNED FOR 3 HOURS TODAY.    Problem: Chronic Conditions and Co-morbidities  Goal: Patient's chronic conditions and co-morbidity symptoms are monitored and maintained or improved  8/10/2023 1924 by Sonya Nguyen RN  Outcome: Progressing

## 2023-08-10 NOTE — PROGRESS NOTES
Received pre HD report from  Ruddy Acevedo RN. Pt in bed, A+O x4, no s/s of distress noted. Accessed AVG Left upper arm w/16 G needle . Venous site infiltrated and  re -cannulated. Pt tolerated well. Tx initiated at 1535. .      AVF/ AVG flowing with ease. For hemodynamic stability UF goal 2000 ml. Vascular access visible at all times throughout entire duration of treatment and line connections remain intact throughout entire duration of treatment. 1 hr into treatment, arterial pressures increased. Pt complained of discomfort to Left AVG. Treatment terminated. Dr. Carrie Byers notifield    Tx completed at [id number], tolerated well 0.5 L removed. De-accessed per protocol. Clot time 5 minutes for arterial, and 5 minutes for venous. Unit nurse Ruddy Acevedo RN given report.

## 2023-08-10 NOTE — PROGRESS NOTES
RENAL DAILY PROGRESS NOTE    70-year-old female with past medical history of diabetes, congestive heart failure, COPD, severe CKD admitted for hypoxic respiratory failure following for renal failure  Subjective:     Complaint:    Overnight events noted  S/p Left side cardiac cath yesterday ,   NO stent placed,   Denies for any chest pain or short of breath today     IMPRESSION:   Acute on chronic kidney injury, required dialysis support for hyperkalemia and fluid management  Access left arm AV graft  Hyperkalemia, corrected  Congestive heart failure, systolic and diastolic, need further cardiac work-up  Anemia  Diabetes   PLAN:   Plan to continue HD support for now, she has been accepted to [facility]  dialysis center, awaiting confirmation. Ordered hepatitis B core total Ab  HD today with 2K bath, UF goal 2L as tolerated . Adjust meds per ESRD status. Case discussed with Dr. Jae Wolf.          Current Facility-Administered Medications   Medication Dose Route Frequency    bisacodyl (DULCOLAX) suppository 10 mg  10 mg Rectal Daily PRN    sodium chloride flush 0.9 % injection 5-40 mL  5-40 mL IntraVENous 2 times per day    sodium chloride flush 0.9 % injection 5-40 mL  5-40 mL IntraVENous PRN    0.9 % sodium chloride infusion   IntraVENous PRN    acetaminophen (TYLENOL) tablet 650 mg  650 mg Oral Q4H PRN    polyethylene glycol (GLYCOLAX) packet 17 g  17 g Oral BID    gabapentin (NEURONTIN) capsule 100 mg  100 mg Oral Daily    gabapentin (NEURONTIN) capsule 400 mg  400 mg Oral QHS    tiZANidine (ZANAFLEX) tablet 2 mg  2 mg Oral Q12H PRN    aspirin chewable tablet 81 mg  81 mg Oral Daily    isosorbide mononitrate (IMDUR) extended release tablet 30 mg  30 mg Oral Daily    atorvastatin (LIPITOR) tablet 40 mg  40 mg Oral Nightly    Virt-Caps 1 mg  1 capsule Oral Daily    predniSONE (DELTASONE) tablet 40 mg  40 mg Oral BID with meals    pantoprazole (PROTONIX) tablet 40 mg  40 mg Oral QAM AC    ipratropium 0.5

## 2023-08-10 NOTE — PROGRESS NOTES
INTERIM UPDATE - 2031 EST on 8/09/2023    Nursing Staff calls reporting that Patient is requesting increase in nightly Gabapentin dosage. Per Nursing Staff, Patient states that this increased was discussed with daytime Hospitalist and was reportedly approved. Patient also requests Zanaflex (i.e. Tizanidine) 4 mg. Nursing Staff reports that Patient has been intermittently confused this visit. Plan:  INCREASED Patient's Gabapentin to 400 mg qHS (and maintained AM dose at Gabapentin 100 mg due to Patient's CrCl of 21 mL/min). Added Tizanidine 2 mg  q12 hrs PRN muscle spasms. Notably, hold-parameters for were included in order instructions regarding hypotension and disorientation.

## 2023-08-10 NOTE — PLAN OF CARE
Problem: Physical Therapy - Adult  Description: Physical Therapy Goals:  Initiated 8/10/2023 to be met within 7-10 days. 1.  Patient will move from supine to sit and sit to supine , scoot up and down, and roll side to side in bed with modified independence. 2.  Patient will transfer from bed to chair and chair to bed with modified independence using the least restrictive device. 3.  Patient will perform sit to stand with modified independence. 4.  Patient will ambulate with supervision/set-up for 100 feet with the least restrictive device. 5.  Patient will ascend/descend 3 stairs with unilateral handrail(s) with supervision/set-up. PLOF: Pt Hilario with cane/rollator as needed, lives with  in 1 story house with 4 NÉSTOR with handrails. Goal: By Discharge: Performs mobility at highest level of function for planned discharge setting. See evaluation for individualized goals. Outcome: Progressing   PHYSICAL THERAPY EVALUATION    Patient: Marie Haley (51 y.o. female)  Date: 8/10/2023  Primary Diagnosis: Lactic acidosis [E87.20]  Respiratory distress [R06.03]  SIRS (systemic inflammatory response syndrome) (HCC) [R65.10]  Respiratory failure with hypoxia (HCC) [J96.91]  Acute on chronic respiratory failure with hypoxia (HCC) [J96.21]  Opacities of both lungs present on chest x-ray [R91.8]  Procedure(s) (LRB):  Left heart cath (N/A)  Coronary angiography (N/A) 1 Day Post-Op   Precautions: Contact Precautions, Fall Risk    ASSESSMENT :  Pt cleared to participate in PT session, pt received semi-reclined in bed and agreeable to therapy session. Based on the objective data described below, the patient presents with decreased endurance, decreased strength, decreased balance reactions, gait deviations, and decreased independence in functional mobility. Pt completing supine to sit with supervision. Good sitting balance.  Pt standing with CGA to RW, ambulating x3 feet reporting dizziness and returning to

## 2023-08-10 NOTE — PLAN OF CARE
Problem: Occupational Therapy - Adult  Goal: By Discharge: Performs self-care activities at highest level of function for planned discharge setting. See evaluation for individualized goals. Description: Occupational Therapy Goals:  Initiated 8/9/2023 to be met within 7-10 days. 1.  Patient will perform bed mobility in preparation for ADLs with modified independence. 2.  Patient will perform functional activity standing > 3 minutes with modified independence, F+ balance. 3.  Patient will perform upper and lower body dressing with modified independence. 4.  Patient will perform toilet transfers with supervision/set-up. 5.  Patient will perform all aspects of toileting with modified independence. 6.  Patient will participate in upper extremity therapeutic exercise/activities with modified independence for 8-10 minutes to increase ROM/strength for ADLs. 7.  Patient will utilize energy conservation techniques during functional activities with verbal cues. PLOF: Patient was modified independent (additional time needed d/t LLE swelling/discomfort) with self-care and used a cane occasionally for functional mobility PTA. Outcome: Progressing       OCCUPATIONAL THERAPY TREATMENT    Patient: Kvng Lopez (15 y.o. female)  Date: 8/10/2023  Diagnosis: Lactic acidosis [E87.20]  Respiratory distress [R06.03]  SIRS (systemic inflammatory response syndrome) (HCC) [R65.10]  Respiratory failure with hypoxia (HCC) [J96.91]  Acute on chronic respiratory failure with hypoxia (HCC) [J96.21]  Opacities of both lungs present on chest x-ray [R91.8] Respiratory failure with hypoxia (HCC)  Procedure(s) (LRB):  Left heart cath (N/A)  Coronary angiography (N/A) 1 Day Post-Op  Precautions: Contact Precautions, Fall Risk    Chart, occupational therapy assessment, plan of care, and goals were reviewed. ASSESSMENT:  Patient cleared to participate in OT treatment by MD whom reporting pt may d/c this date.  Upon entering the room,

## 2023-08-11 ENCOUNTER — HOME HEALTH ADMISSION (OUTPATIENT)
Age: 73
End: 2023-08-11
Payer: MEDICARE

## 2023-08-11 VITALS
BODY MASS INDEX: 37.41 KG/M2 | TEMPERATURE: 97.9 F | WEIGHT: 219.14 LBS | HEART RATE: 80 BPM | DIASTOLIC BLOOD PRESSURE: 61 MMHG | RESPIRATION RATE: 18 BRPM | SYSTOLIC BLOOD PRESSURE: 134 MMHG | HEIGHT: 64 IN | OXYGEN SATURATION: 97 %

## 2023-08-11 LAB
GLUCOSE BLD STRIP.AUTO-MCNC: 135 MG/DL (ref 70–110)
GLUCOSE BLD STRIP.AUTO-MCNC: 177 MG/DL (ref 70–110)
HBV CORE AB SERPL QL IA: NEGATIVE

## 2023-08-11 PROCEDURE — 6360000002 HC RX W HCPCS: Performed by: PHYSICIAN ASSISTANT

## 2023-08-11 PROCEDURE — 2500000003 HC RX 250 WO HCPCS: Performed by: INTERNAL MEDICINE

## 2023-08-11 PROCEDURE — 6370000000 HC RX 637 (ALT 250 FOR IP): Performed by: INTERNAL MEDICINE

## 2023-08-11 PROCEDURE — 94761 N-INVAS EAR/PLS OXIMETRY MLT: CPT

## 2023-08-11 PROCEDURE — 2580000003 HC RX 258: Performed by: INTERNAL MEDICINE

## 2023-08-11 PROCEDURE — 99239 HOSP IP/OBS DSCHRG MGMT >30: CPT | Performed by: INTERNAL MEDICINE

## 2023-08-11 PROCEDURE — 6370000000 HC RX 637 (ALT 250 FOR IP): Performed by: PHYSICIAN ASSISTANT

## 2023-08-11 PROCEDURE — 6360000002 HC RX W HCPCS: Performed by: INTERNAL MEDICINE

## 2023-08-11 PROCEDURE — 99233 SBSQ HOSP IP/OBS HIGH 50: CPT | Performed by: INTERNAL MEDICINE

## 2023-08-11 PROCEDURE — 2700000000 HC OXYGEN THERAPY PER DAY

## 2023-08-11 PROCEDURE — 82962 GLUCOSE BLOOD TEST: CPT

## 2023-08-11 PROCEDURE — 94640 AIRWAY INHALATION TREATMENT: CPT

## 2023-08-11 RX ORDER — PANTOPRAZOLE SODIUM 40 MG/1
40 TABLET, DELAYED RELEASE ORAL
Qty: 30 TABLET | Refills: 0 | Status: SHIPPED | OUTPATIENT
Start: 2023-08-12

## 2023-08-11 RX ORDER — TIZANIDINE 2 MG/1
2 TABLET ORAL EVERY 12 HOURS PRN
Qty: 24 TABLET | Refills: 0 | OUTPATIENT
Start: 2023-08-11 | End: 2023-09-10

## 2023-08-11 RX ORDER — NEPHROCAP 1 MG
1 CAP ORAL DAILY
Qty: 30 CAPSULE | Refills: 0 | Status: SHIPPED | OUTPATIENT
Start: 2023-08-12 | End: 2023-09-11

## 2023-08-11 RX ORDER — PREDNISONE 20 MG/1
40 TABLET ORAL DAILY
Qty: 10 TABLET | Refills: 0 | Status: SHIPPED | OUTPATIENT
Start: 2023-08-12 | End: 2023-08-17

## 2023-08-11 RX ORDER — NITROGLYCERIN 0.4 MG/1
0.4 TABLET SUBLINGUAL EVERY 5 MIN PRN
Qty: 25 TABLET | Refills: 3 | Status: SHIPPED | OUTPATIENT
Start: 2023-08-11

## 2023-08-11 RX ORDER — HYDRALAZINE HYDROCHLORIDE 25 MG/1
25 TABLET, FILM COATED ORAL EVERY 8 HOURS SCHEDULED
Qty: 90 TABLET | Refills: 1 | Status: SHIPPED | OUTPATIENT
Start: 2023-08-11

## 2023-08-11 RX ORDER — ATORVASTATIN CALCIUM 40 MG/1
40 TABLET, FILM COATED ORAL NIGHTLY
Qty: 30 TABLET | Refills: 1 | Status: SHIPPED | OUTPATIENT
Start: 2023-08-11

## 2023-08-11 RX ORDER — GABAPENTIN 400 MG/1
400 CAPSULE ORAL DAILY
Qty: 30 CAPSULE | Refills: 0 | OUTPATIENT
Start: 2023-08-11 | End: 2023-09-10

## 2023-08-11 RX ORDER — POLYETHYLENE GLYCOL 3350 17 G/17G
17 POWDER, FOR SOLUTION ORAL 2 TIMES DAILY
Qty: 527 G | Refills: 1 | Status: SHIPPED | OUTPATIENT
Start: 2023-08-11 | End: 2023-09-10

## 2023-08-11 RX ORDER — BISACODYL 10 MG
10 SUPPOSITORY, RECTAL RECTAL DAILY PRN
Qty: 20 SUPPOSITORY | Refills: 0 | Status: SHIPPED | OUTPATIENT
Start: 2023-08-11 | End: 2023-09-10

## 2023-08-11 RX ORDER — BUMETANIDE 1 MG/1
1 TABLET ORAL 2 TIMES DAILY
Qty: 30 TABLET | Refills: 3 | OUTPATIENT
Start: 2023-08-11

## 2023-08-11 RX ORDER — METOPROLOL SUCCINATE 25 MG/1
25 TABLET, EXTENDED RELEASE ORAL DAILY
Qty: 30 TABLET | Refills: 1 | Status: SHIPPED | OUTPATIENT
Start: 2023-08-12

## 2023-08-11 RX ORDER — ISOSORBIDE MONONITRATE 30 MG/1
30 TABLET, EXTENDED RELEASE ORAL DAILY
Qty: 30 TABLET | Refills: 1 | Status: SHIPPED | OUTPATIENT
Start: 2023-08-12

## 2023-08-11 RX ADMIN — ASPIRIN 81 MG: 81 TABLET, CHEWABLE ORAL at 09:39

## 2023-08-11 RX ADMIN — ARFORMOTEROL TARTRATE 15 MCG: 15 SOLUTION RESPIRATORY (INHALATION) at 07:14

## 2023-08-11 RX ADMIN — ISOSORBIDE MONONITRATE 30 MG: 30 TABLET, EXTENDED RELEASE ORAL at 09:39

## 2023-08-11 RX ADMIN — METOPROLOL SUCCINATE 25 MG: 25 TABLET, EXTENDED RELEASE ORAL at 09:39

## 2023-08-11 RX ADMIN — BUDESONIDE 1 MG: 1 SUSPENSION RESPIRATORY (INHALATION) at 07:14

## 2023-08-11 RX ADMIN — PREDNISONE 40 MG: 20 TABLET ORAL at 09:40

## 2023-08-11 RX ADMIN — PANTOPRAZOLE SODIUM 40 MG: 40 TABLET, DELAYED RELEASE ORAL at 06:11

## 2023-08-11 RX ADMIN — GUAIFENESIN 600 MG: 600 TABLET, EXTENDED RELEASE ORAL at 09:39

## 2023-08-11 RX ADMIN — SODIUM CHLORIDE, PRESERVATIVE FREE 10 ML: 5 INJECTION INTRAVENOUS at 09:40

## 2023-08-11 RX ADMIN — POLYETHYLENE GLYCOL 3350 17 G: 17 POWDER, FOR SOLUTION ORAL at 09:40

## 2023-08-11 RX ADMIN — BUMETANIDE 1 MG: 0.25 INJECTION INTRAMUSCULAR; INTRAVENOUS at 09:40

## 2023-08-11 RX ADMIN — NEPHROCAP 1 MG: 1 CAP ORAL at 09:40

## 2023-08-11 RX ADMIN — GABAPENTIN 100 MG: 100 CAPSULE ORAL at 06:17

## 2023-08-11 NOTE — PROGRESS NOTES
dz; Dr Carol Shaw    Diabetes [facility]) 2013    Dilated cardiomyopathy ([address])     Dyslipidemia     Gout     Heart attack ([address]) 10/18/2019    Stented    Hypercholesteremia 2014    Hypertension 2000    Mild protein-calorie malnutrition ([address]) 2023    Obesity     Orthostatic hypotension 2016    Osteoarthritis of both knees     Popliteal cyst     Sleep apnea     not using cpap    Stage 3b chronic kidney disease ([address]) 2022    Vitamin D deficiency 10/16/2014        Past Surgical History:   Procedure Laterality Date    CARDIAC CATHETERIZATION      Stents x 2    CARDIAC PROCEDURE N/A 2023    Left heart cath performed by Josh Tanner MD at [address] [place] [address] Dr N/A 2023    Coronary angiography performed by Josh Tanner MD at [facility]    COLONOSCOPY N/A 06/15/2023    COLONOSCOPY performed by David Keenan MD at [address] [place] [address]  2023    DIALYSIS FISTULA CREATION Left 03/10/2023    LEFT ARM BRACHIAL ARTERY TO AXILLARY VEIN ARTERIO VENOUS GRAFT WITH ARTEGRAFT performed by Nico Canseco MD at [address] [place] [address]    IR TUNNELED C/Casia 10 5 YEARS  2022    IR TUNNELED CATHETER PLACEMENT GREATER THAN 5 YEARS 2022 [facility] RAD ANGIO IR    IR TUNNELED CATHETER PLACEMENT GREATER THAN 5 YEARS  2022    NERVE BLOCK Left 03/10/2023    NERVE BLOCK (DEFINE) performed by Nico Canseco MD at [facility]  2023    Exploratory laparotomy, sigmoid colectomy, and descending colostomy    SMALL INTESTINE SURGERY N/A 2023    EXPLORATORY LAPAROTOMY; LYSIS OF EXTENSIVE ADHESION, PARTIAL COLECTOMY, COLOSTOMY TAKEDOWN performed by Liss Miller MD at [address] [facility]            No Known Allergies     Social History     Tobacco Use    Smoking status: Former     Packs/day: 1.00     Types: Cigarettes     Quit date: 2019     Years since quittin.2    Smokeless Date: 8/5/2023  Similar findings of stranding and trace fluid in the left lower quadrant with tracking along the left paracolic gutter and additional mild stranding in the omentum. Perhaps minimal improvement since prior exam. No free air or abscess. Interval development of small bilateral pleural effusions with pulmonary edema and trace pericardial fluid. Development of minimal adjacent dependent subsegmental atelectasis. Interval removal of skin staples. Similar colonic fecal load. Some nonspecific small bowel feces noted which can be associated with delayed transit. Please see report for additional findings and full details. Thank you for enabling us to participate in the care of this patient. 04/05/23    VAS DUP HEMODIALYSIS ACCESS SURVEILLANCE LEFT 04/06/2023 12:08 PM (Final)    Interpretation Summary    Patent left brachial artery to axillary vein AVG with no evidence of stenosis. Volume flow ranged 1356 to 1877 ml/min. Signed by: Gosia Sanchez MD on 4/6/2023 12:08 PM     Ultrasound Result (most recent):  US RETROPERITONEAL COMPLETE 12/17/2021    Narrative  EXAM: RETROPERITONEAL ULTRASOUND    INDICATION: LUIS ALBERTO. Malaise. Acute renal failure. TECHNIQUE: Select gray scale and color doppler images from retroperitoneal  ultrasound provided for interpretation. COMPARISON: Abdominal ultrasound 3/19/2021, CT chest/abdomen/pelvis 3/18/2021    FINDINGS:  Right Kidney: 8.5 cm in length. Vascular flow is demonstrated to the right  kidney. Increased echogenicity. No hydronephrosis. No solid mass is seen. Few small cysts, measuring up to 1.5 cm in the mid  kidney. No renal stones. Left Kidney: 8.7 cm in length. Vascular flow is demonstrated to the left kidney. Increased echogenicity. No hydronephrosis. No solid mass is seen. No renal stones. Urinary Bladder: No wall thickening or intraluminal debris. Post void residual  not measured. Free Fluid: None.     Visualized portions of the

## 2023-08-11 NOTE — PLAN OF CARE
Problem: Discharge Planning  Goal: Discharge to home or other facility with appropriate resources  8/11/2023 1431 by Ray Bullock RN  Outcome: Adequate for Discharge  8/11/2023 0958 by Ray Bullock RN  Outcome: Progressing     Problem: Safety - Adult  Goal: Free from fall injury  8/11/2023 1431 by Ray Bullock RN  Outcome: Adequate for Discharge  8/11/2023 0958 by Ray Bullock RN  Outcome: Progressing     Problem: Pain  Goal: Verbalizes/displays adequate comfort level or baseline comfort level  8/11/2023 1431 by Ray Bullock RN  Outcome: Adequate for Discharge  8/11/2023 0958 by Ray Bullock RN  Outcome: Progressing     Problem: Chronic Conditions and Co-morbidities  Goal: Patient's chronic conditions and co-morbidity symptoms are monitored and maintained or improved  8/11/2023 1431 by Ray Bullock RN  Outcome: Adequate for Discharge  8/11/2023 0958 by Ray Bullock RN  Outcome: Progressing     Problem: Neurosensory - Adult  Goal: Achieves stable or improved neurological status  8/11/2023 1431 by Ray Bullock RN  Outcome: Adequate for Discharge  8/11/2023 0958 by Rya Bullock RN  Outcome: Progressing  Goal: Achieves maximal functionality and self care  8/11/2023 1431 by Ray Bullock RN  Outcome: Adequate for Discharge  8/11/2023 0958 by Ray Bullock RN  Outcome: Progressing     Problem: Respiratory - Adult  Goal: Achieves optimal ventilation and oxygenation  8/11/2023 1431 by Ray Bullock RN  Outcome: Adequate for Discharge  8/11/2023 0958 by Ray Bullock RN  Outcome: Progressing     Problem: Cardiovascular - Adult  Goal: Maintains optimal cardiac output and hemodynamic stability  8/11/2023 1431 by Ray Bullock RN  Outcome: Adequate for Discharge  8/11/2023 0958 by Ray Bullock RN  Outcome: Progressing  Goal: Absence of cardiac dysrhythmias LISETTE Cristina  Outcome: Adequate for Discharge  8/11/2023 0958 by Liu Worthington RN  Outcome: Progressing  Goal: Establish and maintain optimal ostomy function  8/11/2023 1431 by Liu Worthington RN  Outcome: Adequate for Discharge  8/11/2023 0958 by Liu Worthington RN  Outcome: Progressing     Problem: Genitourinary - Adult  Goal: Absence of urinary retention  8/11/2023 1431 by Liu Worthington RN  Outcome: Adequate for Discharge  8/11/2023 0958 by Liu Worthington RN  Outcome: Progressing     Problem: Infection - Adult  Goal: Absence of infection at discharge  8/11/2023 1431 by Liu Worthington RN  Outcome: Adequate for Discharge  8/11/2023 0958 by Liu Worthington RN  Outcome: Progressing     Problem: Metabolic/Fluid and Electrolytes - Adult  Goal: Electrolytes maintained within normal limits  8/11/2023 1431 by Liu Worthington RN  Outcome: Adequate for Discharge  8/11/2023 0958 by Liu Worthington RN  Outcome: Progressing  Goal: Hemodynamic stability and optimal renal function maintained  8/11/2023 1431 by Liu Worthington RN  Outcome: Adequate for Discharge  8/11/2023 0958 by Liu Worthington RN  Outcome: Progressing  Goal: Glucose maintained within prescribed range  8/11/2023 1431 by Liu Worthington RN  Outcome: Adequate for Discharge  8/11/2023 0958 by Liu Worthington RN  Outcome: Progressing     Problem: Hematologic - Adult  Goal: Maintains hematologic stability  8/11/2023 1431 by Liu Worthington RN  Outcome: Adequate for Discharge  8/11/2023 0958 by Liu Worthington RN  Outcome: Progressing     Problem: Nutrition Deficit:  Goal: Optimize nutritional status  8/11/2023 1431 by Liu Worthington RN  Outcome: Adequate for Discharge  8/11/2023 0958 by Liu Worthington RN  Outcome: Progressing

## 2023-08-11 NOTE — PROGRESS NOTES
Discharge order noted for today. Orders received. Home health orders noted. Patient's F[facility] is [facility]. Spoke with Kylee nguyễn at [facility] and she states they are able to accept patient. Home health orders placed in the queue. No other needs identified at this time. Case management remains available as needed. Patient states that her  will transport her home at time of discharge.     [facility] RN CDCES  Case Management

## 2023-08-11 NOTE — HOME CARE
Received home health referral for [facility] for (SN, PT, OT). Discharge order noted to for today. Spoke with patient in room;  patient identifiers verified. Explained home health care services and routines. Demographics verified including insurance, phone and address confirmed. Patient has the following DME: hae available rolling walker; cane; shower chair; and BSC. Caregivers available spouse as primary.   Orders noted and arranged and sent to central intake and scheduling.      -   Kylee Barriga LPN  [facility]

## 2023-08-11 NOTE — PROGRESS NOTES
RENAL DAILY PROGRESS NOTE    61-year-old female with past medical history of diabetes, congestive heart failure, COPD, severe CKD admitted for hypoxic respiratory failure following for renal failure  Subjective:     Complaint:    Overnight events noted  Her access was infiltrated yesterday, not able to pull much fluid off  Her breathing is at baseline. IMPRESSION:   Acute on chronic kidney injury, required dialysis support for hyperkalemia and fluid management  Access left arm AV graft  Hyperkalemia, corrected  Congestive heart failure, systolic and diastolic, need further cardiac work-up  Anemia  Diabetes   PLAN:   No indication for dialysis today , she appears well compensated from volume stand point. Continue diuretics. She has been accepted for outpatient dialysis, working on paperwork. From discharge stand point, okay to discharge will notify patient once chair confirm. I have discussed with her. Plan give rest to her access for now, reevaluate tomorrow if remain hospitalized. Adjust meds per ESRD status. Case discussed with Dr. Renay Ortiz and Dr. Mayra Collado.          Current Facility-Administered Medications   Medication Dose Route Frequency    predniSONE (DELTASONE) tablet 40 mg  40 mg Oral Daily    bisacodyl (DULCOLAX) suppository 10 mg  10 mg Rectal Daily PRN    sodium chloride flush 0.9 % injection 5-40 mL  5-40 mL IntraVENous 2 times per day    sodium chloride flush 0.9 % injection 5-40 mL  5-40 mL IntraVENous PRN    0.9 % sodium chloride infusion   IntraVENous PRN    acetaminophen (TYLENOL) tablet 650 mg  650 mg Oral Q4H PRN    polyethylene glycol (GLYCOLAX) packet 17 g  17 g Oral BID    gabapentin (NEURONTIN) capsule 100 mg  100 mg Oral Daily    gabapentin (NEURONTIN) capsule 400 mg  400 mg Oral QHS    tiZANidine (ZANAFLEX) tablet 2 mg  2 mg Oral Q12H PRN    aspirin chewable tablet 81 mg  81 mg Oral Daily    isosorbide mononitrate (IMDUR) extended release tablet 30 mg  30 mg Oral Daily

## 2023-08-11 NOTE — PLAN OF CARE
Problem: Discharge Planning  Goal: Discharge to home or other facility with appropriate resources  Outcome: Progressing     Problem: Safety - Adult  Goal: Free from fall injury  Outcome: Progressing     Problem: Pain  Goal: Verbalizes/displays adequate comfort level or baseline comfort level  Outcome: Progressing     Problem: Chronic Conditions and Co-morbidities  Goal: Patient's chronic conditions and co-morbidity symptoms are monitored and maintained or improved  Outcome: Progressing     Problem: Neurosensory - Adult  Goal: Achieves stable or improved neurological status  Outcome: Progressing  Goal: Achieves maximal functionality and self care  Outcome: Progressing     Problem: Respiratory - Adult  Goal: Achieves optimal ventilation and oxygenation  Outcome: Progressing     Problem: Cardiovascular - Adult  Goal: Maintains optimal cardiac output and hemodynamic stability  Outcome: Progressing  Goal: Absence of cardiac dysrhythmias or at baseline  Outcome: Progressing     Problem: Skin/Tissue Integrity - Adult  Goal: Skin integrity remains intact  Outcome: Progressing  Goal: Incisions, wounds, or drain sites healing without S/S of infection  Outcome: Progressing  Goal: Oral mucous membranes remain intact  Outcome: Progressing     Problem: Musculoskeletal - Adult  Goal: Return mobility to safest level of function  Outcome: Progressing  Goal: Maintain proper alignment of affected body part  Outcome: Progressing  Goal: Return ADL status to a safe level of function  Outcome: Progressing     Problem: Gastrointestinal - Adult  Goal: Minimal or absence of nausea and vomiting  Outcome: Progressing  Goal: Maintains or returns to baseline bowel function  Outcome: Progressing  Goal: Maintains adequate nutritional intake  Outcome: Progressing  Goal: Establish and maintain optimal ostomy function  Outcome: Progressing     Problem: Genitourinary - Adult  Goal: Absence of urinary retention  Outcome: Progressing     Problem: Infection - Adult  Goal: Absence of infection at discharge  Outcome: Progressing     Problem: Metabolic/Fluid and Electrolytes - Adult  Goal: Electrolytes maintained within normal limits  Outcome: Progressing  Goal: Hemodynamic stability and optimal renal function maintained  Outcome: Progressing  Goal: Glucose maintained within prescribed range  Outcome: Progressing     Problem: Hematologic - Adult  Goal: Maintains hematologic stability  Outcome: Progressing     Problem: Nutrition Deficit:  Goal: Optimize nutritional status  Outcome: Progressing [de-identified] : 7/16/21: Sinus rhythm @ 85bmp, PRWP, no acute ST-T changes [de-identified] : 3/8/21: LVEF 45%, LVIDd 4.7cm, aortic root 4.3cm/ascending aorta 4.3cm/aortic arch 3.5cm, mild NV, minimal MR\par \par 9/10/20: LVEF 43%, aortic root 4.4cm, ascending aorta 4.3cm\par \par 2/27/20: LVEF <35%, LAE, mild MR, no pulmonary HTN [de-identified] : 3/10/20: LVEF 27%, LVE 6.0cm, CO 5.5, CI 2.78, mild MR, no convincing focus of LGE [de-identified] : 2/28/20: Luminal irregularities. LVEF 45%. Normal pulmonary capillary wedge pressure. Normal cardiac output.

## 2023-08-12 ENCOUNTER — HOME CARE VISIT (OUTPATIENT)
Age: 73
End: 2023-08-12
Payer: MEDICARE

## 2023-08-12 PROCEDURE — G0299 HHS/HOSPICE OF RN EA 15 MIN: HCPCS

## 2023-08-14 ENCOUNTER — HOME CARE VISIT (OUTPATIENT)
Age: 73
End: 2023-08-14
Payer: MEDICARE

## 2023-08-14 ENCOUNTER — APPOINTMENT (OUTPATIENT)
Facility: HOSPITAL | Age: 73
End: 2023-08-14
Payer: MEDICARE

## 2023-08-14 ENCOUNTER — HOSPITAL ENCOUNTER (EMERGENCY)
Facility: HOSPITAL | Age: 73
Discharge: HOME | End: 2023-08-14
Attending: EMERGENCY MEDICINE
Payer: MEDICARE

## 2023-08-14 VITALS
DIASTOLIC BLOOD PRESSURE: 71 MMHG | OXYGEN SATURATION: 98 % | WEIGHT: 202 LBS | RESPIRATION RATE: 19 BRPM | HEART RATE: 86 BPM | SYSTOLIC BLOOD PRESSURE: 152 MMHG | BODY MASS INDEX: 34.49 KG/M2 | HEIGHT: 64 IN | TEMPERATURE: 98.2 F

## 2023-08-14 VITALS
DIASTOLIC BLOOD PRESSURE: 70 MMHG | SYSTOLIC BLOOD PRESSURE: 120 MMHG | TEMPERATURE: 97.8 F | RESPIRATION RATE: 14 BRPM | HEART RATE: 78 BPM | OXYGEN SATURATION: 98 %

## 2023-08-14 DIAGNOSIS — Z99.2 ESRD (END STAGE RENAL DISEASE) ON DIALYSIS (HCC): Primary | ICD-10-CM

## 2023-08-14 DIAGNOSIS — N18.6 ESRD (END STAGE RENAL DISEASE) ON DIALYSIS (HCC): Primary | ICD-10-CM

## 2023-08-14 LAB
ALBUMIN SERPL-MCNC: 3.2 G/DL (ref 3.4–5)
ALBUMIN/GLOB SERPL: 1.3 (ref 0.8–1.7)
ALP SERPL-CCNC: 128 U/L (ref 45–117)
ALT SERPL-CCNC: 60 U/L (ref 13–56)
ANION GAP SERPL CALC-SCNC: 7 MMOL/L (ref 3–18)
AST SERPL-CCNC: 39 U/L (ref 10–38)
BASOPHILS # BLD: 0 K/UL (ref 0–0.1)
BASOPHILS NFR BLD: 0 % (ref 0–2)
BILIRUB SERPL-MCNC: 0.5 MG/DL (ref 0.2–1)
BUN SERPL-MCNC: 71 MG/DL (ref 7–18)
BUN/CREAT SERPL: 25 (ref 12–20)
CALCIUM SERPL-MCNC: 8.2 MG/DL (ref 8.5–10.1)
CHLORIDE SERPL-SCNC: 103 MMOL/L (ref 100–111)
CO2 SERPL-SCNC: 26 MMOL/L (ref 21–32)
CREAT SERPL-MCNC: 2.89 MG/DL (ref 0.6–1.3)
DIFFERENTIAL METHOD BLD: ABNORMAL
EOSINOPHIL # BLD: 0 K/UL (ref 0–0.4)
EOSINOPHIL NFR BLD: 0 % (ref 0–5)
ERYTHROCYTE [DISTWIDTH] IN BLOOD BY AUTOMATED COUNT: 17.3 % (ref 11.6–14.5)
GLOBULIN SER CALC-MCNC: 2.5 G/DL (ref 2–4)
GLUCOSE SERPL-MCNC: 210 MG/DL (ref 74–99)
HBV SURFACE AG SER QL: <0.1 INDEX
HBV SURFACE AG SER QL: NEGATIVE
HCT VFR BLD AUTO: 24.4 % (ref 35–45)
HGB BLD-MCNC: 7.3 G/DL (ref 12–16)
IMM GRANULOCYTES # BLD AUTO: 0.6 K/UL (ref 0–0.04)
IMM GRANULOCYTES NFR BLD AUTO: 5 % (ref 0–0.5)
LYMPHOCYTES # BLD: 0.8 K/UL (ref 0.9–3.6)
LYMPHOCYTES NFR BLD: 7 % (ref 21–52)
MCH RBC QN AUTO: 27.7 PG (ref 24–34)
MCHC RBC AUTO-ENTMCNC: 29.9 G/DL (ref 31–37)
MCV RBC AUTO: 92.4 FL (ref 78–100)
MONOCYTES # BLD: 0.6 K/UL (ref 0.05–1.2)
MONOCYTES NFR BLD: 5 % (ref 3–10)
NEUTS SEG # BLD: 9.7 K/UL (ref 1.8–8)
NEUTS SEG NFR BLD: 83 % (ref 40–73)
NRBC # BLD: 0.15 K/UL (ref 0–0.01)
NRBC BLD-RTO: 1.3 PER 100 WBC
PLATELET # BLD AUTO: 361 K/UL (ref 135–420)
PLATELET COMMENT: ABNORMAL
PMV BLD AUTO: 10.1 FL (ref 9.2–11.8)
POTASSIUM SERPL-SCNC: 4.1 MMOL/L (ref 3.5–5.5)
PROT SERPL-MCNC: 5.7 G/DL (ref 6.4–8.2)
RBC # BLD AUTO: 2.64 M/UL (ref 4.2–5.3)
RBC MORPH BLD: ABNORMAL
RBC MORPH BLD: ABNORMAL
SODIUM SERPL-SCNC: 136 MMOL/L (ref 136–145)
WBC # BLD AUTO: 11.7 K/UL (ref 4.6–13.2)

## 2023-08-14 PROCEDURE — 80053 COMPREHEN METABOLIC PANEL: CPT

## 2023-08-14 PROCEDURE — 6360000002 HC RX W HCPCS: Performed by: PHYSICIAN ASSISTANT

## 2023-08-14 PROCEDURE — 96374 THER/PROPH/DIAG INJ IV PUSH: CPT

## 2023-08-14 PROCEDURE — 86706 HEP B SURFACE ANTIBODY: CPT

## 2023-08-14 PROCEDURE — 93005 ELECTROCARDIOGRAM TRACING: CPT | Performed by: PHYSICIAN ASSISTANT

## 2023-08-14 PROCEDURE — 71045 X-RAY EXAM CHEST 1 VIEW: CPT

## 2023-08-14 PROCEDURE — 85025 COMPLETE CBC W/AUTO DIFF WBC: CPT

## 2023-08-14 PROCEDURE — 87340 HEPATITIS B SURFACE AG IA: CPT

## 2023-08-14 PROCEDURE — G0151 HHCP-SERV OF PT,EA 15 MIN: HCPCS

## 2023-08-14 PROCEDURE — 99285 EMERGENCY DEPT VISIT HI MDM: CPT

## 2023-08-14 RX ORDER — FUROSEMIDE 10 MG/ML
80 INJECTION INTRAMUSCULAR; INTRAVENOUS
Status: COMPLETED | OUTPATIENT
Start: 2023-08-14 | End: 2023-08-14

## 2023-08-14 RX ADMIN — FUROSEMIDE 80 MG: 10 INJECTION, SOLUTION INTRAMUSCULAR; INTRAVENOUS at 15:13

## 2023-08-14 ASSESSMENT — ENCOUNTER SYMPTOMS
VOMITING: 0
DIARRHEA: 0
DYSPNEA ACTIVITY LEVEL: AFTER AMBULATING LESS THAN 10 FT
SHORTNESS OF BREATH: 1
ABDOMINAL PAIN: 0
HEMOPTYSIS: 0
SORE THROAT: 0

## 2023-08-14 NOTE — HOME HEALTH
Skilled services/Home bound verification:  PATIENT AND CAREGIVER UNDERSTAND HOMEBOUND STATUS. Skilled Reason for admission/summary of clinical condition:  :  COPD with Exacerbation. This patient is homebound for the following reasons Requires considerable and taxing effort to leave the home , Requires the assistance of 1 or more persons to leave the home  and Only leaves the home for medical reasons or [employer] services and are infrequent and of short duration for other reasons . Caregiver: spouse. Caregiver assists with . Assists with ADLs, Medication management, Transportation to appointments, Meal prep and assists with ambulation. .    Medications reconciled and all medications are available in the home this visit. The following education was provided regarding medications: All medications should be given the same time daily. Medications  are effective at this time. High risk medication teaching regarding antiplatelets and hyperglycemic agents, performed. Patient on baby ASA,  I discussed the signs of aquiles and occult bleeding, prevention of bleeding when taking antiplatelets. and when to call MD.  She also takes Levemir for her Diabetes, I went over the signs and symptoms of hyper/hypoglycemia and the importance of rotating injections. I also stressed the importance of using a sharps container for used needles and discard with top tightly closed. Instruct Patient in insulin purpose, side effects, possible complications, administration procedure, and schedule. Instruct on safe handling and storage of insulin and on safe disposal of needles and testing equipment. Home health supplies by type and quantity ordered/delivered this visit include:n/a    Patient education provided this visit to include:  Patient is a fall risk, I recommend supervision at all times, keeping walk ways/halls clear of clutter.     Went over the different positions he can get into to increase oxygenation ie:

## 2023-08-14 NOTE — CASE COMMUNICATION
SOC done on 8/12/23. Patient is ambulating with a cane. Patient with MELL AV fistula. was on HD for 6 months or so and was off till this hospitalization. She was dialyses x 2 while in hospital, no plans for further treatments at this time.

## 2023-08-14 NOTE — ED TRIAGE NOTES
Patient presents to the ed with BLE edema and missed dialysis. Patient states that she received a call stating that she needs emergency dialysis treatment seeing that her last dialysis session was last Tuesday. Patient states that she hasn't had dialysis because no one called her with her dialysis center.

## 2023-08-15 VITALS
DIASTOLIC BLOOD PRESSURE: 50 MMHG | RESPIRATION RATE: 16 BRPM | TEMPERATURE: 97.7 F | HEART RATE: 73 BPM | SYSTOLIC BLOOD PRESSURE: 110 MMHG | OXYGEN SATURATION: 98 %

## 2023-08-15 LAB
EKG ATRIAL RATE: 83 BPM
EKG DIAGNOSIS: NORMAL
EKG P AXIS: 60 DEGREES
EKG P-R INTERVAL: 174 MS
EKG Q-T INTERVAL: 388 MS
EKG QRS DURATION: 88 MS
EKG QTC CALCULATION (BAZETT): 455 MS
EKG R AXIS: 25 DEGREES
EKG T AXIS: 76 DEGREES
EKG VENTRICULAR RATE: 83 BPM
HBV SURFACE AB SER QL IA: POSITIVE
HBV SURFACE AB SERPL IA-ACNC: >1000 MIU/ML
HEP BS AB COMMENT: NORMAL

## 2023-08-15 PROCEDURE — 93010 ELECTROCARDIOGRAM REPORT: CPT | Performed by: INTERNAL MEDICINE

## 2023-08-15 ASSESSMENT — ENCOUNTER SYMPTOMS: PAIN LOCATION - PAIN QUALITY: PRESSURE

## 2023-08-15 NOTE — PROGRESS NOTES
Physician Progress Note      Renetta Ennis  CSN #:                  [id number]  :                       1950  ADMIT DATE:       8/3/2023 11:13 PM  [address] [place] [address] DATE:        2023 4:22 PM  RESPONDING  PROVIDER #:        Brigida Barber MD          QUERY TEXT:    Patient admitted with acute hypoxic resp failure: multifactorial due to COPD   and HF/volume overload. Noted documentation of NSTEMI type II in Cardiology   consult and progress note on 08/10. In order to support the diagnosis of   NSTEMI type II, please include additional clinical indicators in your   documentation. Or please document if the diagnosis of NSTEMI type II has been   ruled out after further study. The medical record reflects the following:  Risk Factors: PMH CKD; COPD; DM; CHF; CM; CAD  Clinical Indicators: 08/10  Cardiology note:  Acute heart failure reduced   ejection fraction prior preserved  NSTEMI type II  Coronary artery disease status post left heart catheterization with mild to   moderate disease per cath report  Troponin 60, 68, 52  Treatment: Enzymes; EKG; Cardiac Cath; ASA    Thank you,  Maggie Lu [email or web address]  Options provided:  -- NSTEMI type II present as evidenced by, Please document evidence.   -- NSTEMI type II was ruled out  -- Other - I will add my own diagnosis  -- Disagree - Not applicable / Not valid  -- Disagree - Clinically unable to determine / Unknown  -- Refer to Clinical Documentation Reviewer    PROVIDER RESPONSE TEXT:    NSTEMI type II is present as evidenced by decompensated CHF reduced EF    Query created by: Maggie Lu on 8/10/2023 12:42 PM      Electronically signed by:  Brigida Barber MD 8/15/2023 9:27 AM

## 2023-08-16 ENCOUNTER — HOME CARE VISIT (OUTPATIENT)
Age: 73
End: 2023-08-16
Payer: MEDICARE

## 2023-08-16 PROCEDURE — G0157 HHC PT ASSISTANT EA 15: HCPCS

## 2023-08-17 ENCOUNTER — HOME CARE VISIT (OUTPATIENT)
Age: 73
End: 2023-08-17
Payer: MEDICARE

## 2023-08-17 VITALS
OXYGEN SATURATION: 98 % | RESPIRATION RATE: 17 BRPM | DIASTOLIC BLOOD PRESSURE: 60 MMHG | TEMPERATURE: 98.2 F | SYSTOLIC BLOOD PRESSURE: 116 MMHG | HEART RATE: 104 BPM

## 2023-08-18 ENCOUNTER — HOME CARE VISIT (OUTPATIENT)
Age: 73
End: 2023-08-18
Payer: MEDICARE

## 2023-08-18 PROCEDURE — G0300 HHS/HOSPICE OF LPN EA 15 MIN: HCPCS

## 2023-08-19 NOTE — DISCHARGE SUMMARY
XL) 25 MG extended release tablet Take 1 tablet by mouth daily, Disp-30 tablet, R-1Normal      predniSONE (DELTASONE) 20 MG tablet Take 2 tablets by mouth daily for 5 days, Disp-10 tablet, R-0Normal      bisacodyl (DULCOLAX) 10 MG suppository Place 1 suppository rectally daily as needed for Constipation, Disp-20 suppository, R-0Normal      polyethylene glycol (GLYCOLAX) 17 g packet Take 1 packet by mouth 2 times daily, Disp-527 g, R-1Normal      B Complex-C-Folic Acid (VIRT-CAPS) 1 MG CAPS Take 1 capsule by mouth daily, Disp-30 capsule, R-0Normal      pantoprazole (PROTONIX) 40 MG tablet Take 1 tablet by mouth every morning (before breakfast), Disp-30 tablet, R-0Normal           CONTINUE these medications which have NOT CHANGED    Details   albuterol sulfate HFA (PROVENTIL;VENTOLIN;PROAIR) 108 (90 Base) MCG/ACT inhaler Inhale 2 puffs into the lungs every 6 hours as neededHistorical Med      aspirin 81 MG chewable tablet Take 2 tablets by mouth dailyHistorical Med      gabapentin (NEURONTIN) 400 MG capsule Take 1 capsule by mouth daily. Historical Med      ipratropium-albuterol (DUONEB) 0.5-2.5 (3) MG/3ML SOLN nebulizer solution Inhale 3 mLs into the lungs every 6 hours as neededHistorical Med      Umeclidinium-Vilanterol 62.5-25 MCG/ACT AEPB Inhale 1 puff into the lungs dailyHistorical Med               Follow-up Appointments:   1.  Your PCP: ESTRADA Stoddard NP, within 7-10days            >30 minutes spent coordinating this discharge (review instructions/follow-up, prescriptions, preparing report for sign off)    Signed:  Gerber Alcaraz MD  8/18/2023  10:58 PM

## 2023-08-22 VITALS
SYSTOLIC BLOOD PRESSURE: 126 MMHG | OXYGEN SATURATION: 96 % | RESPIRATION RATE: 17 BRPM | HEART RATE: 78 BPM | DIASTOLIC BLOOD PRESSURE: 72 MMHG | TEMPERATURE: 98.2 F

## 2023-08-22 NOTE — HOME HEALTH
Skilled reason for visit: Pt assessment, disease process education, medication management    Caregiver involvement: Pt resides in a two story home with her , amb with use of a walker and is independent with ADL's and medications and  assts with transportation to [facility] and is available 24/7 to asst as needed. This visit: Pt is A&Ox4, able to communicate her needs, denies pain or discomfort at this time, she has began going to HD 3x/wk on Tu/Th/Sat and is tolerating it well thus far, no complications to date, SN discussed DMII and Insulin medications at this visit, deferred CHF/ESRD to next visits. Home health supplies by type and quantity ordered/delivered this visit include: N/A    Medications reviewed and all medications are available in the home this visit. MD notified of any discrepancies/look a-like medications/medication interactions: NA    Medications are effective at this time. Patient education provided this visit: SEE INTERVENTIONS LIST    Pt advised not to start, stop or change the way she is currently taking any of her medications without first consulting with the MD.    -SN advised pt during shower/bath time to assess skin for evidence of pressure injury such as: non-blanchable erythema, blistering, discoloration, alteration of oral mucosa: signs of mucositis, yeast infection, or breaks in membrane, edema, rash and skin tears. Report location, size, and severity of new or worsening findings to physician. Pt acknowledged understanding of all education provided. Patient's Progress towards personal goals: when patient reaches goals and medication is managed, disease processes are understood patient agrees and understand that discharge will take place.

## 2023-08-23 ENCOUNTER — HOME CARE VISIT (OUTPATIENT)
Age: 73
End: 2023-08-23
Payer: MEDICARE

## 2023-08-23 PROCEDURE — G0300 HHS/HOSPICE OF LPN EA 15 MIN: HCPCS

## 2023-08-25 ENCOUNTER — HOME CARE VISIT (OUTPATIENT)
Age: 73
End: 2023-08-25
Payer: MEDICARE

## 2023-08-25 PROCEDURE — G0157 HHC PT ASSISTANT EA 15: HCPCS

## 2023-08-28 ENCOUNTER — HOME CARE VISIT (OUTPATIENT)
Age: 73
End: 2023-08-28
Payer: MEDICARE

## 2023-08-28 VITALS
HEART RATE: 90 BPM | TEMPERATURE: 98.2 F | DIASTOLIC BLOOD PRESSURE: 58 MMHG | RESPIRATION RATE: 17 BRPM | OXYGEN SATURATION: 98 % | SYSTOLIC BLOOD PRESSURE: 118 MMHG

## 2023-08-28 ASSESSMENT — ENCOUNTER SYMPTOMS: PAIN LOCATION - PAIN QUALITY: ACHE

## 2023-08-30 ENCOUNTER — HOME CARE VISIT (OUTPATIENT)
Age: 73
End: 2023-08-30
Payer: MEDICARE

## 2023-08-30 NOTE — HOME HEALTH
Left message for pt scheduling PT appt today with no return call. Drove by pts home and no answer at door when LPTA arrived at pts home and no answer on phone or return call. Will attempt PT again on 8/30/23.

## 2023-08-31 ENCOUNTER — HOME CARE VISIT (OUTPATIENT)
Age: 73
End: 2023-08-31
Payer: MEDICARE

## 2023-09-01 NOTE — HOME HEALTH
Pt agreed to PT visit for today but no answer at door or phone when LPTA arrived. Pt is scheduled for dc from [facility],[address] PT services secondary to frequent refusal of PT/pt not available.

## 2023-09-06 ENCOUNTER — TELEPHONE (OUTPATIENT)
Age: 73
End: 2023-09-06

## 2023-09-06 ENCOUNTER — HOME CARE VISIT (OUTPATIENT)
Age: 73
End: 2023-09-06
Payer: MEDICARE

## 2023-09-06 NOTE — TELEPHONE ENCOUNTER
Patient has been called and has been scheduled to be seen in the [place] office on 10/11/23 @ 2:00.

## 2023-09-08 ENCOUNTER — HOME CARE VISIT (OUTPATIENT)
Age: 73
End: 2023-09-08
Payer: MEDICARE

## 2023-09-11 ENCOUNTER — HOME CARE VISIT (OUTPATIENT)
Age: 73
End: 2023-09-11

## 2023-09-11 NOTE — CASE COMMUNICATION
Attempts made to schedule with pt on 8/31, 9/10 and 9/11 for OT evaluation. Unable to reach pt for scheduling. Will d/c OT order at this time.

## 2023-09-13 ENCOUNTER — HOME CARE VISIT (OUTPATIENT)
Age: 73
End: 2023-09-13

## 2023-09-19 ENCOUNTER — HOME CARE VISIT (OUTPATIENT)
Age: 73
End: 2023-09-19
Payer: MEDICARE

## 2023-09-20 NOTE — HOME HEALTH
Pt. clinically discharged and documentation finalized for completion of agency discharge. Please see Discharge Summary for details. Discharge is based on PT luis enrique 8/14/23.

## 2023-10-03 ENCOUNTER — HOSPITAL ENCOUNTER (EMERGENCY)
Facility: HOSPITAL | Age: 73
Discharge: HOME | End: 2023-10-03
Attending: EMERGENCY MEDICINE
Payer: MEDICARE

## 2023-10-03 VITALS
TEMPERATURE: 98.2 F | DIASTOLIC BLOOD PRESSURE: 85 MMHG | HEIGHT: 64 IN | HEART RATE: 89 BPM | BODY MASS INDEX: 33.63 KG/M2 | OXYGEN SATURATION: 100 % | WEIGHT: 197 LBS | RESPIRATION RATE: 18 BRPM | SYSTOLIC BLOOD PRESSURE: 159 MMHG

## 2023-10-03 DIAGNOSIS — S16.1XXA STRAIN OF NECK MUSCLE, INITIAL ENCOUNTER: Primary | ICD-10-CM

## 2023-10-03 DIAGNOSIS — M62.838 SPASM OF MUSCLE: ICD-10-CM

## 2023-10-03 PROCEDURE — 99283 EMERGENCY DEPT VISIT LOW MDM: CPT

## 2023-10-03 RX ORDER — METHOCARBAMOL 500 MG/1
750 TABLET, FILM COATED ORAL 3 TIMES DAILY
Qty: 15 TABLET | Refills: 0 | Status: SHIPPED | OUTPATIENT
Start: 2023-10-03 | End: 2023-10-13

## 2023-10-03 RX ORDER — LIDOCAINE 4 G/G
1 PATCH TOPICAL DAILY
Qty: 30 PATCH | Refills: 0 | Status: SHIPPED | OUTPATIENT
Start: 2023-10-03 | End: 2023-11-02

## 2023-10-03 ASSESSMENT — PAIN SCALES - GENERAL: PAINLEVEL_OUTOF10: 10

## 2023-10-03 ASSESSMENT — PAIN - FUNCTIONAL ASSESSMENT: PAIN_FUNCTIONAL_ASSESSMENT: 0-10

## 2023-10-03 ASSESSMENT — PAIN DESCRIPTION - LOCATION: LOCATION: NECK

## 2023-10-03 ASSESSMENT — PAIN DESCRIPTION - PAIN TYPE: TYPE: ACUTE PAIN

## 2023-10-03 NOTE — ED PROVIDER NOTES
Disposition: Discharged    Patient condition at time of disposition: Stable    DISCHARGE NOTE:   Pt has been reexamined. Patient has no new complaints, changes, or physical findings. Care plan outlined and precautions discussed. Results were reviewed with the patient. All medications were reviewed with the patient. All of pt's questions and concerns were addressed. Alarm symptoms and return precautions associated with chief complaint and evaluation were reviewed with the patient in detail. The patient demonstrated adequate understanding. Patient was instructed to follow up with PCP, as well as given strict return precautions to the ED upon further deterioration. Patient is ready for discharge home. [employer] Disclaimer     Please note that this dictation was completed with [employer], the computer voice recognition software. Quite often unanticipated grammatical, syntax, homophones, and other interpretive errors are inadvertently transcribed by the computer software. Please disregard these errors. Please excuse any errors that have escaped final proofreading.       [address] PA-C       [place], [place]  10/08/23 [id number]

## 2023-10-03 NOTE — ED TRIAGE NOTES
PATIENT TAKEN TO THE EMERGENCY DEPT BY EMS WITH COMPLAINT OF  RIGHT SIDED NECK PAIN RADIATING TO RIGHT HAND, PAIN STARTED SATURDAY HOWEVER WORSEN WHEN HE MOVES, PATIENT RATED PAIN 10/10.     PATIENT HAD VITAL SIGN DONE

## 2023-10-08 ASSESSMENT — ENCOUNTER SYMPTOMS
VOMITING: 0
ABDOMINAL PAIN: 0
SORE THROAT: 0
DIARRHEA: 0
SHORTNESS OF BREATH: 0

## 2023-10-13 ENCOUNTER — HOSPITAL ENCOUNTER (OUTPATIENT)
Facility: HOSPITAL | Age: 73
Discharge: HOME | End: 2023-10-16

## 2023-10-13 LAB — LABCORP SPECIMEN COLLECTION: NORMAL

## 2023-11-01 ENCOUNTER — OFFICE VISIT (OUTPATIENT)
Age: 73
End: 2023-11-01

## 2023-11-01 VITALS — BODY MASS INDEX: 33.47 KG/M2 | WEIGHT: 195 LBS

## 2023-11-01 DIAGNOSIS — M25.562 LEFT KNEE PAIN, UNSPECIFIED CHRONICITY: ICD-10-CM

## 2023-11-01 DIAGNOSIS — M25.462 EFFUSION OF LEFT KNEE: ICD-10-CM

## 2023-11-01 DIAGNOSIS — G89.29 CHRONIC PAIN OF LEFT KNEE: ICD-10-CM

## 2023-11-01 DIAGNOSIS — M25.562 CHRONIC PAIN OF LEFT KNEE: ICD-10-CM

## 2023-11-01 DIAGNOSIS — M17.12 PRIMARY OSTEOARTHRITIS OF LEFT KNEE: Primary | ICD-10-CM

## 2023-11-01 DIAGNOSIS — M17.12 ARTHRITIS OF LEFT KNEE: ICD-10-CM

## 2023-11-02 NOTE — PROGRESS NOTES
Patient: Basil Clarke                MRN: [id number]       SSN: [id number]  YOB: 1950        AGE: 72 y.o. SEX: female      PCP: ESTRADA Perez NP  11/02/23 2/2023: Patient returns with a recurrence of acute on chronic left knee pain. Patient has tricompartmental end-stage osteoarthritis of the left knee. Patient    5/4/2023: Patient returns the office with a recurrence of acute on chronic right knee pain. She was previously treated for similar symptoms of her left knee. She is doing well from her aspiration injection of the left knee. She does have tricompartmental osteoarthritis of the right knee. Pain with decreased range of motion of both knees prevent her from walking distance and standing for extended periods. She has a grinding sensation evident in the left knee when she flexes. She has not undergone hyaluronic acid treatment for either knee. Chief Complaint   Patient presents with    Knee Pain     Bilat knee pain       HISTORY:    Basil Clarke is a 72 y.o. female returns to the office with complaint of acute on chronic left knee pain. Patient has a history of tricompartmental osteoarthritis of the left knee and has responded previously to low-dose cortisone injections following aspiration. She was seen earlier in March 2023 during the absence of [facility] and examined by Emily Guzman PA-C at which time she was recommended a CT scan of the left knee. She was hoping for an aspiration with cortisone injection at that time. Since that visit she has not felt the need to complete her CT scan of the knee. She has pain in the knee when she walks distance inclines and stairs as well as stands for short periods of time. Standing from sitting position and sitting from a standing position is also painful. Prior note under the care of Emily Guzman PA-C reviewed.         No results found for: \"HBA1C\", \"[id number]\"      11/1/2023     2:20 PM

## 2023-11-07 ENCOUNTER — OFFICE VISIT (OUTPATIENT)
Age: 73
End: 2023-11-07
Payer: MEDICARE

## 2023-11-07 VITALS
DIASTOLIC BLOOD PRESSURE: 82 MMHG | SYSTOLIC BLOOD PRESSURE: 154 MMHG | BODY MASS INDEX: 32.95 KG/M2 | HEART RATE: 78 BPM | WEIGHT: 193 LBS | OXYGEN SATURATION: 95 % | HEIGHT: 64 IN

## 2023-11-07 DIAGNOSIS — I10 PRIMARY HYPERTENSION: ICD-10-CM

## 2023-11-07 DIAGNOSIS — I34.0 NONRHEUMATIC MITRAL VALVE REGURGITATION: ICD-10-CM

## 2023-11-07 DIAGNOSIS — I25.10 CORONARY ARTERY DISEASE INVOLVING NATIVE CORONARY ARTERY OF NATIVE HEART WITHOUT ANGINA PECTORIS: Primary | ICD-10-CM

## 2023-11-07 DIAGNOSIS — I50.22 CHRONIC SYSTOLIC CONGESTIVE HEART FAILURE (HCC): ICD-10-CM

## 2023-11-07 DIAGNOSIS — E78.2 MIXED HYPERLIPIDEMIA: ICD-10-CM

## 2023-11-07 DIAGNOSIS — I25.5 ISCHEMIC CARDIOMYOPATHY: ICD-10-CM

## 2023-11-07 PROCEDURE — 1036F TOBACCO NON-USER: CPT | Performed by: NURSE PRACTITIONER

## 2023-11-07 PROCEDURE — 99214 OFFICE O/P EST MOD 30 MIN: CPT | Performed by: NURSE PRACTITIONER

## 2023-11-07 PROCEDURE — G8484 FLU IMMUNIZE NO ADMIN: HCPCS | Performed by: NURSE PRACTITIONER

## 2023-11-07 PROCEDURE — 1090F PRES/ABSN URINE INCON ASSESS: CPT | Performed by: NURSE PRACTITIONER

## 2023-11-07 PROCEDURE — G8399 PT W/DXA RESULTS DOCUMENT: HCPCS | Performed by: NURSE PRACTITIONER

## 2023-11-07 PROCEDURE — G8427 DOCREV CUR MEDS BY ELIG CLIN: HCPCS | Performed by: NURSE PRACTITIONER

## 2023-11-07 PROCEDURE — G8417 CALC BMI ABV UP PARAM F/U: HCPCS | Performed by: NURSE PRACTITIONER

## 2023-11-07 PROCEDURE — 3079F DIAST BP 80-89 MM HG: CPT | Performed by: NURSE PRACTITIONER

## 2023-11-07 PROCEDURE — 3077F SYST BP >= 140 MM HG: CPT | Performed by: NURSE PRACTITIONER

## 2023-11-07 PROCEDURE — 3017F COLORECTAL CA SCREEN DOC REV: CPT | Performed by: NURSE PRACTITIONER

## 2023-11-07 PROCEDURE — 1123F ACP DISCUSS/DSCN MKR DOCD: CPT | Performed by: NURSE PRACTITIONER

## 2023-11-07 NOTE — PROGRESS NOTES
HISTORY OF PRESENT ILLNESS  Paige Keenan is a 78 y.o. female. History of anterior wall MI complicated by cardiac arrest due to acute stent closure requiring repeat PCI to her LAD with 2 FELIPE 5/2018 11/2023  Patient seen following recent admission for acute on chronic HFrEF NSTEMI type II. She is status post left heart cath which revealed mild to moderate CAD. Echocardiogram with reduced LV function 35-40%. She is currently off of dialysis and continues to follow with nephrology. She denies chest pain shortness of breath palpitations or edema. Cardiomyopathy  The history is provided by the patient and medical records. This is a chronic problem. The current episode started more than 1 week ago. The problem occurs constantly. The problem has not changed since onset. Associated symptoms include shortness of breath. Pertinent negatives include no chest pain and no headaches. The symptoms are aggravated by exertion. The symptoms are relieved by rest.   Hypertension  The history is provided by the patient and medical records. This is a chronic problem. The current episode started more than 1 week ago. Associated symptoms include shortness of breath. Pertinent negatives include no chest pain and no headaches. Valvular Heart Disease  History provided by: MRYana Associated symptoms include shortness of breath. Pertinent negatives include no chest pain and no headaches. CHF  The history is provided by the medical records. This is a chronic problem. Associated symptoms include shortness of breath. Pertinent negatives include no chest pain and no headaches. Cholesterol Problem  The history is provided by the medical records. This is a chronic problem. Associated symptoms include shortness of breath. Pertinent negatives include no chest pain and no headaches. Shortness of Breath  The history is provided by the patient. This is a chronic problem. The problem occurs intermittently. The current episode started more

## 2023-11-08 ENCOUNTER — OFFICE VISIT (OUTPATIENT)
Age: 73
End: 2023-11-08

## 2023-11-08 VITALS — TEMPERATURE: 97.5 F | WEIGHT: 197 LBS | BODY MASS INDEX: 33.81 KG/M2

## 2023-11-08 DIAGNOSIS — M25.661 STIFFNESS OF RIGHT KNEE, NOT ELSEWHERE CLASSIFIED: ICD-10-CM

## 2023-11-08 DIAGNOSIS — M17.11 PRIMARY OSTEOARTHRITIS OF RIGHT KNEE: Primary | ICD-10-CM

## 2023-11-08 DIAGNOSIS — M25.561 RIGHT KNEE PAIN, UNSPECIFIED CHRONICITY: ICD-10-CM

## 2023-11-08 DIAGNOSIS — M17.5 OTHER SECONDARY OSTEOARTHRITIS OF LEFT KNEE: ICD-10-CM

## 2023-11-08 DIAGNOSIS — M25.461 EFFUSION, RIGHT KNEE: ICD-10-CM

## 2023-11-08 DIAGNOSIS — M25.661 DECREASED RANGE OF MOTION (ROM) OF RIGHT KNEE: ICD-10-CM

## 2023-11-08 RX ORDER — TRIAMCINOLONE ACETONIDE 40 MG/ML
40 INJECTION, SUSPENSION INTRA-ARTICULAR; INTRAMUSCULAR ONCE
Status: COMPLETED | OUTPATIENT
Start: 2023-11-08 | End: 2023-11-08

## 2023-11-08 RX ADMIN — TRIAMCINOLONE ACETONIDE 40 MG: 40 INJECTION, SUSPENSION INTRA-ARTICULAR; INTRAMUSCULAR at 15:51

## 2023-11-08 NOTE — PROGRESS NOTES
axial loading forces of the weight will slow the progression of arthritis below the waist up. Today all of her questions answered to her satisfaction. Procedural: Using sterile technique and verbal and written consent were obtained appropriate timeout formed patient laying supine right knee flexed to degrees on a bolster 5 cc of Xylocaine 1% used anesthetize a superior lateral interarticular approach. To follow 9 cc of straw-colored blood tinted aspirate removed from the same portal.  Fluid was discarded. To follow 1 cc of Kenalog 40 mg per mill mixed with 7 mils of 1% Xylocaine injected. Patient tolerated the procedure well. Chart reviewed for the following:   Cali Middleton PA-C, have reviewed the History, Physical and updated the Allergic reactions for [address] performed immediately prior to start of procedure:   Ozzy BERMUDEZ PA-C, have performed the following reviews on Sharon Rooney prior to the start of the procedure:            * Patient was identified by name and date of birth   * Agreement on procedure being performed was verified  * Risks and Benefits explained to the patient  * Procedure site verified and marked as necessary  * Patient was positioned for comfort  * Consent was signed and verified             Date of procedure: 11/8/2023    Time:200 p.m. Procedure performed by:  Ainsley Marmolejo PA-C    Provider assisted by: None     Patient assisted by: self    How tolerated by patient: tolerated the procedure well with no complications    Comments: none    The patient is asked to continue to rest the area for a few more days before resuming regular activities. It may be more painful for the first 1-2 days. Watch for fever, or increased swelling or persistent pain in the joint. Call or return to clinic prn if such symptoms occur or there is failure to improve as anticipated.  Please Note:    Please Note:        [address] using a frequency of 10MHz with a 12L-RS

## 2023-12-13 ENCOUNTER — APPOINTMENT (OUTPATIENT)
Facility: HOSPITAL | Age: 73
End: 2023-12-13
Payer: MEDICARE

## 2023-12-13 ENCOUNTER — HOSPITAL ENCOUNTER (INPATIENT)
Facility: HOSPITAL | Age: 73
LOS: 2 days | Discharge: HOME | End: 2023-12-16
Attending: STUDENT IN AN ORGANIZED HEALTH CARE EDUCATION/TRAINING PROGRAM | Admitting: STUDENT IN AN ORGANIZED HEALTH CARE EDUCATION/TRAINING PROGRAM
Payer: MEDICARE

## 2023-12-13 DIAGNOSIS — I25.110 CORONARY ARTERY DISEASE INVOLVING NATIVE CORONARY ARTERY OF NATIVE HEART WITH UNSTABLE ANGINA PECTORIS (HCC): ICD-10-CM

## 2023-12-13 DIAGNOSIS — R06.89 DYSPNEA AND RESPIRATORY ABNORMALITIES: Primary | ICD-10-CM

## 2023-12-13 DIAGNOSIS — R06.09 DYSPNEA ON EXERTION: ICD-10-CM

## 2023-12-13 DIAGNOSIS — I82.409 DEEP VEIN THROMBOSIS (DVT) OF LOWER EXTREMITY, UNSPECIFIED CHRONICITY, UNSPECIFIED LATERALITY, UNSPECIFIED VEIN (HCC): ICD-10-CM

## 2023-12-13 DIAGNOSIS — M54.50 RIGHT-SIDED LOW BACK PAIN WITHOUT SCIATICA, UNSPECIFIED CHRONICITY: ICD-10-CM

## 2023-12-13 DIAGNOSIS — R06.00 DYSPNEA AND RESPIRATORY ABNORMALITIES: Primary | ICD-10-CM

## 2023-12-13 LAB
ALBUMIN SERPL-MCNC: 3 G/DL (ref 3.4–5)
ALBUMIN/GLOB SERPL: 0.8 (ref 0.8–1.7)
ALP SERPL-CCNC: 141 U/L (ref 45–117)
ALT SERPL-CCNC: 26 U/L (ref 13–56)
ANION GAP SERPL CALC-SCNC: 7 MMOL/L (ref 3–18)
AST SERPL-CCNC: 29 U/L (ref 10–38)
B PERT DNA SPEC QL NAA+PROBE: NOT DETECTED
BASOPHILS # BLD: 0 K/UL (ref 0–0.1)
BASOPHILS NFR BLD: 0 % (ref 0–2)
BILIRUB SERPL-MCNC: 0.6 MG/DL (ref 0.2–1)
BORDETELLA PARAPERTUSSIS BY PCR: NOT DETECTED
BUN SERPL-MCNC: 36 MG/DL (ref 7–18)
BUN/CREAT SERPL: 16 (ref 12–20)
C PNEUM DNA SPEC QL NAA+PROBE: NOT DETECTED
CALCIUM SERPL-MCNC: 9.6 MG/DL (ref 8.5–10.1)
CHLORIDE SERPL-SCNC: 110 MMOL/L (ref 100–111)
CO2 SERPL-SCNC: 20 MMOL/L (ref 21–32)
CREAT SERPL-MCNC: 2.27 MG/DL (ref 0.6–1.3)
CRP SERPL-MCNC: 8.6 MG/DL (ref 0–0.3)
D DIMER PPP FEU-MCNC: 2.07 UG/ML(FEU)
DIFFERENTIAL METHOD BLD: ABNORMAL
EOSINOPHIL # BLD: 0 K/UL (ref 0–0.4)
EOSINOPHIL NFR BLD: 1 % (ref 0–5)
ERYTHROCYTE [DISTWIDTH] IN BLOOD BY AUTOMATED COUNT: 19.9 % (ref 11.6–14.5)
ERYTHROCYTE [SEDIMENTATION RATE] IN BLOOD: 117 MM/HR (ref 0–30)
ETHANOL SERPL-MCNC: <3 MG/DL (ref 0–3)
FLUAV SUBTYP SPEC NAA+PROBE: NOT DETECTED
FLUBV RNA SPEC QL NAA+PROBE: NOT DETECTED
GLOBULIN SER CALC-MCNC: 3.8 G/DL (ref 2–4)
GLUCOSE SERPL-MCNC: 136 MG/DL (ref 74–99)
HADV DNA SPEC QL NAA+PROBE: NOT DETECTED
HCOV 229E RNA SPEC QL NAA+PROBE: NOT DETECTED
HCOV HKU1 RNA SPEC QL NAA+PROBE: NOT DETECTED
HCOV NL63 RNA SPEC QL NAA+PROBE: NOT DETECTED
HCOV OC43 RNA SPEC QL NAA+PROBE: NOT DETECTED
HCT VFR BLD AUTO: 34.1 % (ref 35–45)
HGB BLD-MCNC: 10.4 G/DL (ref 12–16)
HMPV RNA SPEC QL NAA+PROBE: NOT DETECTED
HPIV1 RNA SPEC QL NAA+PROBE: NOT DETECTED
HPIV2 RNA SPEC QL NAA+PROBE: NOT DETECTED
HPIV3 RNA SPEC QL NAA+PROBE: NOT DETECTED
HPIV4 RNA SPEC QL NAA+PROBE: NOT DETECTED
IMM GRANULOCYTES # BLD AUTO: 0.3 K/UL (ref 0–0.04)
IMM GRANULOCYTES NFR BLD AUTO: 4 % (ref 0–0.5)
LACTATE SERPL-SCNC: 1.2 MMOL/L (ref 0.4–2)
LYMPHOCYTES # BLD: 1.3 K/UL (ref 0.9–3.6)
LYMPHOCYTES NFR BLD: 18 % (ref 21–52)
M PNEUMO DNA SPEC QL NAA+PROBE: NOT DETECTED
MAGNESIUM SERPL-MCNC: 2 MG/DL (ref 1.6–2.6)
MCH RBC QN AUTO: 25.8 PG (ref 24–34)
MCHC RBC AUTO-ENTMCNC: 30.5 G/DL (ref 31–37)
MCV RBC AUTO: 84.6 FL (ref 78–100)
MONOCYTES # BLD: 0.9 K/UL (ref 0.05–1.2)
MONOCYTES NFR BLD: 12 % (ref 3–10)
NEUTS SEG # BLD: 5 K/UL (ref 1.8–8)
NEUTS SEG NFR BLD: 66 % (ref 40–73)
NRBC # BLD: 0.02 K/UL (ref 0–0.01)
NRBC BLD-RTO: 0.3 PER 100 WBC
NT PRO BNP: 3894 PG/ML (ref 0–900)
PLATELET # BLD AUTO: 442 K/UL (ref 135–420)
PMV BLD AUTO: 9.1 FL (ref 9.2–11.8)
POTASSIUM SERPL-SCNC: 5.8 MMOL/L (ref 3.5–5.5)
PROCALCITONIN SERPL-MCNC: 0.07 NG/ML
PROT SERPL-MCNC: 6.8 G/DL (ref 6.4–8.2)
RBC # BLD AUTO: 4.03 M/UL (ref 4.2–5.3)
RSV RNA SPEC QL NAA+PROBE: NOT DETECTED
RV+EV RNA SPEC QL NAA+PROBE: NOT DETECTED
SARS-COV-2 RNA RESP QL NAA+PROBE: NOT DETECTED
SODIUM SERPL-SCNC: 137 MMOL/L (ref 136–145)
TROPONIN I SERPL HS-MCNC: 40 NG/L (ref 0–54)
TSH SERPL DL<=0.05 MIU/L-ACNC: 1.09 UIU/ML (ref 0.36–3.74)
WBC # BLD AUTO: 7.5 K/UL (ref 4.6–13.2)

## 2023-12-13 PROCEDURE — 6360000002 HC RX W HCPCS: Performed by: STUDENT IN AN ORGANIZED HEALTH CARE EDUCATION/TRAINING PROGRAM

## 2023-12-13 PROCEDURE — 84484 ASSAY OF TROPONIN QUANT: CPT

## 2023-12-13 PROCEDURE — 83735 ASSAY OF MAGNESIUM: CPT

## 2023-12-13 PROCEDURE — 99285 EMERGENCY DEPT VISIT HI MDM: CPT

## 2023-12-13 PROCEDURE — 96366 THER/PROPH/DIAG IV INF ADDON: CPT

## 2023-12-13 PROCEDURE — 84145 PROCALCITONIN (PCT): CPT

## 2023-12-13 PROCEDURE — 80053 COMPREHEN METABOLIC PANEL: CPT

## 2023-12-13 PROCEDURE — 71046 X-RAY EXAM CHEST 2 VIEWS: CPT

## 2023-12-13 PROCEDURE — 86140 C-REACTIVE PROTEIN: CPT

## 2023-12-13 PROCEDURE — 83880 ASSAY OF NATRIURETIC PEPTIDE: CPT

## 2023-12-13 PROCEDURE — 36600 WITHDRAWAL OF ARTERIAL BLOOD: CPT

## 2023-12-13 PROCEDURE — 93005 ELECTROCARDIOGRAM TRACING: CPT | Performed by: STUDENT IN AN ORGANIZED HEALTH CARE EDUCATION/TRAINING PROGRAM

## 2023-12-13 PROCEDURE — 96375 TX/PRO/DX INJ NEW DRUG ADDON: CPT

## 2023-12-13 PROCEDURE — 87040 BLOOD CULTURE FOR BACTERIA: CPT

## 2023-12-13 PROCEDURE — 85652 RBC SED RATE AUTOMATED: CPT

## 2023-12-13 PROCEDURE — 83605 ASSAY OF LACTIC ACID: CPT

## 2023-12-13 PROCEDURE — 94761 N-INVAS EAR/PLS OXIMETRY MLT: CPT

## 2023-12-13 PROCEDURE — 6360000004 HC RX CONTRAST MEDICATION: Performed by: STUDENT IN AN ORGANIZED HEALTH CARE EDUCATION/TRAINING PROGRAM

## 2023-12-13 PROCEDURE — 0202U NFCT DS 22 TRGT SARS-COV-2: CPT

## 2023-12-13 PROCEDURE — 82607 VITAMIN B-12: CPT

## 2023-12-13 PROCEDURE — 96365 THER/PROPH/DIAG IV INF INIT: CPT

## 2023-12-13 PROCEDURE — 82077 ASSAY SPEC XCP UR&BREATH IA: CPT

## 2023-12-13 PROCEDURE — 74177 CT ABD & PELVIS W/CONTRAST: CPT

## 2023-12-13 PROCEDURE — 2580000003 HC RX 258: Performed by: STUDENT IN AN ORGANIZED HEALTH CARE EDUCATION/TRAINING PROGRAM

## 2023-12-13 PROCEDURE — 84443 ASSAY THYROID STIM HORMONE: CPT

## 2023-12-13 PROCEDURE — 82746 ASSAY OF FOLIC ACID SERUM: CPT

## 2023-12-13 PROCEDURE — 85025 COMPLETE CBC W/AUTO DIFF WBC: CPT

## 2023-12-13 PROCEDURE — 85379 FIBRIN DEGRADATION QUANT: CPT

## 2023-12-13 PROCEDURE — 71275 CT ANGIOGRAPHY CHEST: CPT

## 2023-12-13 RX ORDER — LEVOFLOXACIN 5 MG/ML
750 INJECTION, SOLUTION INTRAVENOUS ONCE
Status: COMPLETED | OUTPATIENT
Start: 2023-12-13 | End: 2023-12-14

## 2023-12-13 RX ORDER — IODIXANOL 320 MG/ML
80 INJECTION, SOLUTION INTRAVASCULAR
Status: COMPLETED | OUTPATIENT
Start: 2023-12-13 | End: 2023-12-13

## 2023-12-13 RX ORDER — 0.9 % SODIUM CHLORIDE 0.9 %
250 INTRAVENOUS SOLUTION INTRAVENOUS ONCE
Status: COMPLETED | OUTPATIENT
Start: 2023-12-13 | End: 2023-12-14

## 2023-12-13 RX ORDER — 0.9 % SODIUM CHLORIDE 0.9 %
250 INTRAVENOUS SOLUTION INTRAVENOUS ONCE
Status: DISCONTINUED | OUTPATIENT
Start: 2023-12-13 | End: 2023-12-13

## 2023-12-13 RX ORDER — MORPHINE SULFATE 4 MG/ML
4 INJECTION, SOLUTION INTRAMUSCULAR; INTRAVENOUS
Status: COMPLETED | OUTPATIENT
Start: 2023-12-13 | End: 2023-12-13

## 2023-12-13 RX ADMIN — SODIUM CHLORIDE 250 ML: 9 INJECTION, SOLUTION INTRAVENOUS at 22:15

## 2023-12-13 RX ADMIN — MORPHINE SULFATE 4 MG: 4 INJECTION, SOLUTION INTRAMUSCULAR; INTRAVENOUS at 22:14

## 2023-12-13 RX ADMIN — WATER 1000 MG: 1 INJECTION INTRAMUSCULAR; INTRAVENOUS; SUBCUTANEOUS at 22:13

## 2023-12-13 RX ADMIN — IODIXANOL 80 ML: 320 INJECTION, SOLUTION INTRAVASCULAR at 22:52

## 2023-12-13 RX ADMIN — LEVOFLOXACIN 750 MG: 5 INJECTION, SOLUTION INTRAVENOUS at 22:15

## 2023-12-13 ASSESSMENT — PAIN DESCRIPTION - ORIENTATION
ORIENTATION: RIGHT;LEFT
ORIENTATION: RIGHT
ORIENTATION: RIGHT

## 2023-12-13 ASSESSMENT — PAIN DESCRIPTION - PAIN TYPE: TYPE: ACUTE PAIN

## 2023-12-13 ASSESSMENT — PAIN - FUNCTIONAL ASSESSMENT
PAIN_FUNCTIONAL_ASSESSMENT: 0-10
PAIN_FUNCTIONAL_ASSESSMENT: 0-10

## 2023-12-13 ASSESSMENT — PAIN DESCRIPTION - LOCATION
LOCATION: FLANK
LOCATION: BACK
LOCATION: BACK;FOOT

## 2023-12-13 ASSESSMENT — PAIN DESCRIPTION - DESCRIPTORS
DESCRIPTORS: SHARP
DESCRIPTORS: SHARP

## 2023-12-13 ASSESSMENT — PAIN DESCRIPTION - FREQUENCY: FREQUENCY: CONTINUOUS

## 2023-12-13 ASSESSMENT — PAIN SCALES - GENERAL
PAINLEVEL_OUTOF10: 10

## 2023-12-14 ENCOUNTER — APPOINTMENT (OUTPATIENT)
Facility: HOSPITAL | Age: 73
End: 2023-12-14
Attending: STUDENT IN AN ORGANIZED HEALTH CARE EDUCATION/TRAINING PROGRAM
Payer: MEDICARE

## 2023-12-14 ENCOUNTER — HOSPITAL ENCOUNTER (INPATIENT)
Facility: HOSPITAL | Age: 73
Discharge: HOME | End: 2023-12-16
Attending: STUDENT IN AN ORGANIZED HEALTH CARE EDUCATION/TRAINING PROGRAM
Payer: MEDICARE

## 2023-12-14 ENCOUNTER — APPOINTMENT (OUTPATIENT)
Facility: HOSPITAL | Age: 73
End: 2023-12-14
Payer: MEDICARE

## 2023-12-14 PROBLEM — R06.00 DYSPNEA: Status: ACTIVE | Noted: 2023-12-14

## 2023-12-14 PROBLEM — R06.09 DYSPNEA ON EXERTION: Status: ACTIVE | Noted: 2023-12-14

## 2023-12-14 PROBLEM — I50.43 ACUTE ON CHRONIC HEART FAILURE WITH REDUCED EJECTION FRACTION AND DIASTOLIC DYSFUNCTION (HCC): Status: ACTIVE | Noted: 2023-08-05

## 2023-12-14 PROBLEM — R06.89 DYSPNEA AND RESPIRATORY ABNORMALITIES: Status: ACTIVE | Noted: 2023-12-14

## 2023-12-14 PROBLEM — J96.01 ACUTE HYPOXIC RESPIRATORY FAILURE (HCC): Status: ACTIVE | Noted: 2019-10-18

## 2023-12-14 LAB
ALBUMIN SERPL-MCNC: 2.7 G/DL (ref 3.4–5)
ANION GAP SERPL CALC-SCNC: 8 MMOL/L (ref 3–18)
APPEARANCE UR: CLEAR
BILIRUB UR QL: NEGATIVE
BUN SERPL-MCNC: 32 MG/DL (ref 7–18)
BUN/CREAT SERPL: 12 (ref 12–20)
CALCIUM SERPL-MCNC: 9.5 MG/DL (ref 8.5–10.1)
CHLORIDE SERPL-SCNC: 109 MMOL/L (ref 100–111)
CO2 SERPL-SCNC: 20 MMOL/L (ref 21–32)
COLOR UR: YELLOW
CREAT SERPL-MCNC: 2.58 MG/DL (ref 0.6–1.3)
ECHO AO ASC DIAM: 3.4 CM
ECHO AO ASCENDING AORTA INDEX: 1.75 CM/M2
ECHO AO ROOT DIAM: 2.5 CM
ECHO AO ROOT INDEX: 1.29 CM/M2
ECHO AV AREA PEAK VELOCITY: 2.1 CM2
ECHO AV AREA/BSA PEAK VELOCITY: 1.1 CM2/M2
ECHO AV PEAK GRADIENT: 10 MMHG
ECHO AV PEAK VELOCITY: 1.6 M/S
ECHO AV VELOCITY RATIO: 0.75
ECHO BSA: 2 M2
ECHO BSA: 2 M2
ECHO LA DIAMETER INDEX: 2.01 CM/M2
ECHO LA DIAMETER: 3.9 CM
ECHO LA TO AORTIC ROOT RATIO: 1.56
ECHO LA VOL A-L A2C: 62 ML (ref 22–52)
ECHO LA VOL A-L A4C: 33 ML (ref 22–52)
ECHO LA VOL BP: 47 ML (ref 22–52)
ECHO LA VOL MOD A2C: 59 ML (ref 22–52)
ECHO LA VOL MOD A4C: 30 ML (ref 22–52)
ECHO LA VOL/BSA BIPLANE: 24 ML/M2 (ref 16–34)
ECHO LA VOLUME AREA LENGTH: 52 ML
ECHO LA VOLUME INDEX A-L A2C: 32 ML/M2 (ref 16–34)
ECHO LA VOLUME INDEX A-L A4C: 17 ML/M2 (ref 16–34)
ECHO LA VOLUME INDEX AREA LENGTH: 27 ML/M2 (ref 16–34)
ECHO LA VOLUME INDEX MOD A2C: 30 ML/M2 (ref 16–34)
ECHO LA VOLUME INDEX MOD A4C: 15 ML/M2 (ref 16–34)
ECHO LV FRACTIONAL SHORTENING: 23 % (ref 28–44)
ECHO LV INTERNAL DIMENSION DIASTOLE INDEX: 2.89 CM/M2
ECHO LV INTERNAL DIMENSION DIASTOLIC: 5.6 CM (ref 3.9–5.3)
ECHO LV INTERNAL DIMENSION SYSTOLIC INDEX: 2.22 CM/M2
ECHO LV INTERNAL DIMENSION SYSTOLIC: 4.3 CM
ECHO LV IVSD: 0.8 CM (ref 0.6–0.9)
ECHO LV MASS 2D: 165 G (ref 67–162)
ECHO LV MASS INDEX 2D: 85.1 G/M2 (ref 43–95)
ECHO LV POSTERIOR WALL DIASTOLIC: 0.8 CM (ref 0.6–0.9)
ECHO LV RELATIVE WALL THICKNESS RATIO: 0.29
ECHO LVOT AREA: 2.8 CM2
ECHO LVOT DIAM: 1.9 CM
ECHO LVOT PEAK GRADIENT: 5 MMHG
ECHO LVOT PEAK VELOCITY: 1.2 M/S
ECHO PV MAX VELOCITY: 1.1 M/S
ECHO PV PEAK GRADIENT: 5 MMHG
ECHO RV TAPSE: 1.4 CM (ref 1.7–?)
EKG ATRIAL RATE: 100 BPM
EKG DIAGNOSIS: NORMAL
EKG P AXIS: 73 DEGREES
EKG P-R INTERVAL: 162 MS
EKG Q-T INTERVAL: 352 MS
EKG QRS DURATION: 74 MS
EKG QTC CALCULATION (BAZETT): 454 MS
EKG R AXIS: 1 DEGREES
EKG T AXIS: 92 DEGREES
EKG VENTRICULAR RATE: 100 BPM
FOLATE SERPL-MCNC: 15.8 NG/ML (ref 3.1–17.5)
GLUCOSE SERPL-MCNC: 177 MG/DL (ref 74–99)
GLUCOSE UR STRIP.AUTO-MCNC: NEGATIVE MG/DL
HGB UR QL STRIP: NEGATIVE
KETONES UR QL STRIP.AUTO: NEGATIVE MG/DL
LEUKOCYTE ESTERASE UR QL STRIP.AUTO: NEGATIVE
MAGNESIUM SERPL-MCNC: 2 MG/DL (ref 1.6–2.6)
NITRITE UR QL STRIP.AUTO: NEGATIVE
PH UR STRIP: 5 (ref 5–8)
PHOSPHATE SERPL-MCNC: 3.5 MG/DL (ref 2.5–4.9)
POTASSIUM SERPL-SCNC: 4.3 MMOL/L (ref 3.5–5.5)
POTASSIUM SERPL-SCNC: 4.7 MMOL/L (ref 3.5–5.5)
PROT UR STRIP-MCNC: NEGATIVE MG/DL
SODIUM SERPL-SCNC: 137 MMOL/L (ref 136–145)
SP GR UR REFRACTOMETRY: 1.02 (ref 1–1.03)
TROPONIN I SERPL HS-MCNC: 40 NG/L (ref 0–54)
UROBILINOGEN UR QL STRIP.AUTO: 0.2 EU/DL (ref 0.2–1)
VIT B12 SERPL-MCNC: 205 PG/ML (ref 211–911)

## 2023-12-14 PROCEDURE — 99221 1ST HOSP IP/OBS SF/LOW 40: CPT | Performed by: INTERNAL MEDICINE

## 2023-12-14 PROCEDURE — 1100000003 HC PRIVATE W/ TELEMETRY

## 2023-12-14 PROCEDURE — 6360000002 HC RX W HCPCS: Performed by: STUDENT IN AN ORGANIZED HEALTH CARE EDUCATION/TRAINING PROGRAM

## 2023-12-14 PROCEDURE — 6370000000 HC RX 637 (ALT 250 FOR IP): Performed by: STUDENT IN AN ORGANIZED HEALTH CARE EDUCATION/TRAINING PROGRAM

## 2023-12-14 PROCEDURE — 93970 EXTREMITY STUDY: CPT

## 2023-12-14 PROCEDURE — 96366 THER/PROPH/DIAG IV INF ADDON: CPT

## 2023-12-14 PROCEDURE — 93308 TTE F-UP OR LMTD: CPT

## 2023-12-14 PROCEDURE — 2580000003 HC RX 258: Performed by: STUDENT IN AN ORGANIZED HEALTH CARE EDUCATION/TRAINING PROGRAM

## 2023-12-14 PROCEDURE — 6370000000 HC RX 637 (ALT 250 FOR IP)

## 2023-12-14 PROCEDURE — 94640 AIRWAY INHALATION TREATMENT: CPT

## 2023-12-14 PROCEDURE — 93010 ELECTROCARDIOGRAM REPORT: CPT | Performed by: INTERNAL MEDICINE

## 2023-12-14 PROCEDURE — 6360000002 HC RX W HCPCS

## 2023-12-14 PROCEDURE — 93970 EXTREMITY STUDY: CPT | Performed by: INTERNAL MEDICINE

## 2023-12-14 PROCEDURE — 81003 URINALYSIS AUTO W/O SCOPE: CPT

## 2023-12-14 PROCEDURE — 84484 ASSAY OF TROPONIN QUANT: CPT

## 2023-12-14 PROCEDURE — 80069 RENAL FUNCTION PANEL: CPT

## 2023-12-14 PROCEDURE — 84132 ASSAY OF SERUM POTASSIUM: CPT

## 2023-12-14 RX ORDER — METOPROLOL SUCCINATE 25 MG/1
25 TABLET, EXTENDED RELEASE ORAL DAILY
Status: DISCONTINUED | OUTPATIENT
Start: 2023-12-14 | End: 2023-12-16 | Stop reason: HOSPADM

## 2023-12-14 RX ORDER — PANTOPRAZOLE SODIUM 40 MG/1
40 TABLET, DELAYED RELEASE ORAL
Status: DISCONTINUED | OUTPATIENT
Start: 2023-12-14 | End: 2023-12-16 | Stop reason: HOSPADM

## 2023-12-14 RX ORDER — ASPIRIN 81 MG/1
162 TABLET, CHEWABLE ORAL DAILY
Status: DISCONTINUED | OUTPATIENT
Start: 2023-12-14 | End: 2023-12-14

## 2023-12-14 RX ORDER — ARFORMOTEROL TARTRATE 15 UG/2ML
15 SOLUTION RESPIRATORY (INHALATION)
Status: DISCONTINUED | OUTPATIENT
Start: 2023-12-14 | End: 2023-12-16 | Stop reason: HOSPADM

## 2023-12-14 RX ORDER — SODIUM CHLORIDE 0.9 % (FLUSH) 0.9 %
5-40 SYRINGE (ML) INJECTION PRN
Status: DISCONTINUED | OUTPATIENT
Start: 2023-12-14 | End: 2023-12-16 | Stop reason: HOSPADM

## 2023-12-14 RX ORDER — GABAPENTIN 400 MG/1
400 CAPSULE ORAL EVERY EVENING
Status: DISCONTINUED | OUTPATIENT
Start: 2023-12-14 | End: 2023-12-16 | Stop reason: HOSPADM

## 2023-12-14 RX ORDER — ASPIRIN 81 MG/1
81 TABLET, CHEWABLE ORAL DAILY
Status: DISCONTINUED | OUTPATIENT
Start: 2023-12-15 | End: 2023-12-16 | Stop reason: HOSPADM

## 2023-12-14 RX ORDER — MAGNESIUM SULFATE IN WATER 40 MG/ML
2000 INJECTION, SOLUTION INTRAVENOUS
Status: COMPLETED | OUTPATIENT
Start: 2023-12-14 | End: 2023-12-14

## 2023-12-14 RX ORDER — ACETAMINOPHEN 325 MG/1
650 TABLET ORAL EVERY 6 HOURS PRN
Status: DISCONTINUED | OUTPATIENT
Start: 2023-12-14 | End: 2023-12-16 | Stop reason: HOSPADM

## 2023-12-14 RX ORDER — MAGNESIUM SULFATE IN WATER 40 MG/ML
2000 INJECTION, SOLUTION INTRAVENOUS PRN
Status: DISCONTINUED | OUTPATIENT
Start: 2023-12-14 | End: 2023-12-16 | Stop reason: HOSPADM

## 2023-12-14 RX ORDER — BUMETANIDE 0.25 MG/ML
2 INJECTION INTRAMUSCULAR; INTRAVENOUS EVERY 12 HOURS
Status: DISCONTINUED | OUTPATIENT
Start: 2023-12-14 | End: 2023-12-14

## 2023-12-14 RX ORDER — POLYETHYLENE GLYCOL 3350 17 G/17G
17 POWDER, FOR SOLUTION ORAL DAILY PRN
Status: DISCONTINUED | OUTPATIENT
Start: 2023-12-14 | End: 2023-12-16 | Stop reason: HOSPADM

## 2023-12-14 RX ORDER — ONDANSETRON 4 MG/1
4 TABLET, ORALLY DISINTEGRATING ORAL EVERY 8 HOURS PRN
Status: DISCONTINUED | OUTPATIENT
Start: 2023-12-14 | End: 2023-12-16 | Stop reason: HOSPADM

## 2023-12-14 RX ORDER — BUMETANIDE 1 MG/1
2 TABLET ORAL DAILY
Status: DISCONTINUED | OUTPATIENT
Start: 2023-12-14 | End: 2023-12-14

## 2023-12-14 RX ORDER — PREDNISONE 20 MG/1
40 TABLET ORAL DAILY
Status: DISCONTINUED | OUTPATIENT
Start: 2023-12-14 | End: 2023-12-16 | Stop reason: HOSPADM

## 2023-12-14 RX ORDER — ONDANSETRON 2 MG/ML
4 INJECTION INTRAMUSCULAR; INTRAVENOUS EVERY 6 HOURS PRN
Status: DISCONTINUED | OUTPATIENT
Start: 2023-12-14 | End: 2023-12-16 | Stop reason: HOSPADM

## 2023-12-14 RX ORDER — BUDESONIDE 1 MG/2ML
0.5 INHALANT ORAL
Status: DISCONTINUED | OUTPATIENT
Start: 2023-12-14 | End: 2023-12-16 | Stop reason: HOSPADM

## 2023-12-14 RX ORDER — ATORVASTATIN CALCIUM 40 MG/1
40 TABLET, FILM COATED ORAL NIGHTLY
Status: DISCONTINUED | OUTPATIENT
Start: 2023-12-14 | End: 2023-12-16 | Stop reason: HOSPADM

## 2023-12-14 RX ORDER — IPRATROPIUM BROMIDE AND ALBUTEROL SULFATE 2.5; .5 MG/3ML; MG/3ML
2 SOLUTION RESPIRATORY (INHALATION)
Status: COMPLETED | OUTPATIENT
Start: 2023-12-14 | End: 2023-12-14

## 2023-12-14 RX ORDER — POTASSIUM CHLORIDE 7.45 MG/ML
10 INJECTION INTRAVENOUS PRN
Status: DISCONTINUED | OUTPATIENT
Start: 2023-12-14 | End: 2023-12-16 | Stop reason: HOSPADM

## 2023-12-14 RX ORDER — ACETAMINOPHEN 650 MG/1
650 SUPPOSITORY RECTAL EVERY 6 HOURS PRN
Status: DISCONTINUED | OUTPATIENT
Start: 2023-12-14 | End: 2023-12-16 | Stop reason: HOSPADM

## 2023-12-14 RX ORDER — SODIUM CHLORIDE 0.9 % (FLUSH) 0.9 %
5-40 SYRINGE (ML) INJECTION EVERY 12 HOURS SCHEDULED
Status: DISCONTINUED | OUTPATIENT
Start: 2023-12-14 | End: 2023-12-16 | Stop reason: HOSPADM

## 2023-12-14 RX ORDER — SODIUM CHLORIDE 9 MG/ML
INJECTION, SOLUTION INTRAVENOUS PRN
Status: DISCONTINUED | OUTPATIENT
Start: 2023-12-14 | End: 2023-12-16 | Stop reason: HOSPADM

## 2023-12-14 RX ORDER — ENOXAPARIN SODIUM 100 MG/ML
30 INJECTION SUBCUTANEOUS DAILY
Status: DISCONTINUED | OUTPATIENT
Start: 2023-12-14 | End: 2023-12-16 | Stop reason: HOSPADM

## 2023-12-14 RX ORDER — ALBUTEROL SULFATE 2.5 MG/3ML
SOLUTION RESPIRATORY (INHALATION) EVERY 6 HOURS PRN
Status: DISCONTINUED | OUTPATIENT
Start: 2023-12-14 | End: 2023-12-16 | Stop reason: HOSPADM

## 2023-12-14 RX ORDER — HYDRALAZINE HYDROCHLORIDE 25 MG/1
25 TABLET, FILM COATED ORAL EVERY 8 HOURS SCHEDULED
Status: DISCONTINUED | OUTPATIENT
Start: 2023-12-14 | End: 2023-12-16 | Stop reason: HOSPADM

## 2023-12-14 RX ORDER — POTASSIUM CHLORIDE 20 MEQ/1
40 TABLET, EXTENDED RELEASE ORAL PRN
Status: DISCONTINUED | OUTPATIENT
Start: 2023-12-14 | End: 2023-12-16 | Stop reason: HOSPADM

## 2023-12-14 RX ORDER — FUROSEMIDE 10 MG/ML
60 INJECTION INTRAMUSCULAR; INTRAVENOUS ONCE
Status: COMPLETED | OUTPATIENT
Start: 2023-12-14 | End: 2023-12-14

## 2023-12-14 RX ADMIN — MAGNESIUM SULFATE HEPTAHYDRATE 2000 MG: 40 INJECTION, SOLUTION INTRAVENOUS at 02:53

## 2023-12-14 RX ADMIN — IPRATROPIUM BROMIDE 0.5 MG: 0.5 SOLUTION RESPIRATORY (INHALATION) at 14:33

## 2023-12-14 RX ADMIN — FUROSEMIDE 60 MG: 10 INJECTION, SOLUTION INTRAMUSCULAR; INTRAVENOUS at 03:55

## 2023-12-14 RX ADMIN — ENOXAPARIN SODIUM 30 MG: 100 INJECTION SUBCUTANEOUS at 09:07

## 2023-12-14 RX ADMIN — GABAPENTIN 400 MG: 400 CAPSULE ORAL at 18:08

## 2023-12-14 RX ADMIN — ASPIRIN 162 MG: 81 TABLET, CHEWABLE ORAL at 08:58

## 2023-12-14 RX ADMIN — METOPROLOL SUCCINATE 25 MG: 25 TABLET, EXTENDED RELEASE ORAL at 08:57

## 2023-12-14 RX ADMIN — PANTOPRAZOLE SODIUM 40 MG: 40 TABLET, DELAYED RELEASE ORAL at 08:57

## 2023-12-14 RX ADMIN — BUDESONIDE 0.5 MG: 1 SUSPENSION RESPIRATORY (INHALATION) at 19:15

## 2023-12-14 RX ADMIN — IPRATROPIUM BROMIDE 0.5 MG: 0.5 SOLUTION RESPIRATORY (INHALATION) at 09:37

## 2023-12-14 RX ADMIN — ACETAMINOPHEN 325MG 650 MG: 325 TABLET ORAL at 18:08

## 2023-12-14 RX ADMIN — ARFORMOTEROL TARTRATE 15 MCG: 15 SOLUTION RESPIRATORY (INHALATION) at 19:14

## 2023-12-14 RX ADMIN — SODIUM CHLORIDE, PRESERVATIVE FREE 10 ML: 5 INJECTION INTRAVENOUS at 22:01

## 2023-12-14 RX ADMIN — IPRATROPIUM BROMIDE AND ALBUTEROL SULFATE 2 DOSE: .5; 3 SOLUTION RESPIRATORY (INHALATION) at 02:53

## 2023-12-14 RX ADMIN — ARFORMOTEROL TARTRATE 15 MCG: 15 SOLUTION RESPIRATORY (INHALATION) at 09:40

## 2023-12-14 RX ADMIN — SODIUM CHLORIDE, PRESERVATIVE FREE 10 ML: 5 INJECTION INTRAVENOUS at 09:08

## 2023-12-14 RX ADMIN — IPRATROPIUM BROMIDE 0.5 MG: 0.5 SOLUTION RESPIRATORY (INHALATION) at 19:14

## 2023-12-14 RX ADMIN — SODIUM CHLORIDE, PRESERVATIVE FREE 10 ML: 5 INJECTION INTRAVENOUS at 09:09

## 2023-12-14 RX ADMIN — ATORVASTATIN CALCIUM 40 MG: 40 TABLET, FILM COATED ORAL at 22:01

## 2023-12-14 RX ADMIN — PREDNISONE 40 MG: 20 TABLET ORAL at 15:30

## 2023-12-14 RX ADMIN — BUDESONIDE 0.5 MG: 1 SUSPENSION RESPIRATORY (INHALATION) at 14:34

## 2023-12-14 ASSESSMENT — PAIN DESCRIPTION - LOCATION: LOCATION: FLANK

## 2023-12-14 ASSESSMENT — PAIN DESCRIPTION - DESCRIPTORS: DESCRIPTORS: ACHING

## 2023-12-14 ASSESSMENT — PAIN SCALES - GENERAL: PAINLEVEL_OUTOF10: 10

## 2023-12-14 ASSESSMENT — PAIN DESCRIPTION - ORIENTATION: ORIENTATION: MID

## 2023-12-14 NOTE — ED NOTES
Assumed care of pt. Pt resting in position of comfort, NAD at this time.      Vignesh Gomes RN  12/14/23 8317

## 2023-12-14 NOTE — ED NOTES
Pt back from vascular, pt placed back on cont monitor, pure wick placed. Breakfast ray given and troponin collected and taken to lab.       Latia Damon RN  12/14/23 0197

## 2023-12-14 NOTE — H&P
Hospitalist Admission History and Physical    NAME:  Judy Ennis   :   1950   MRN:   [id number]     PCP:  ESTRADA Clifford NP  Date/Time:  2023 3:50 AM  Subjective:   CHIEF COMPLAINT:      HISTORY OF PRESENT ILLNESS:     Girish Gaona is a 72 y.o.  female with past medical history of CHF, COPD, diabetes, chronic kidney disease, history of PE, and presents with dyspnea on exertion. Dyspnea on exertion is associated with bilateral feet pain and low right back pain. Symptoms started a few days ago. It got worse yesterday. Patient's back pain was severe to the point that she was using a cane to assist with ambulation. Her  has been helping her get to the bathroom. Patient used to be on dialysis, but was told by Dr. Sonal Wylie that she no longer needs dialysis. Last dialysis was in August at prior hospitalization. Patient does not want to get back on dialysis. Patient reports having adequate urine output while being on Bumex. She denies chest pain. Went to presented to the ED, blood pressure 124/104, heart rate 112, respiratory rate 13. Labs significant for potassium 5.8, CO2 20, BUN 36, creatinine 2.27, proBNP 3894, hemoglobin 10.4, platelet 201, D-dimer 2.07, respiratory panel negative. CTA chest shows no evidence for pulmonary emboli. There is emphysema. CT abdomen pelvis shows primary anastomosis in the sigmoid colon without obstruction or focal bowel wall thickening. Renal cyst.  Uterine fibroids. Patient was given ceftriaxone 1 g, Levaquin 750 mg, DuoNeb, morphine 4 mg.         Past Medical History:   Diagnosis Date    Anemia     Chondromalacia of both patellae     Chronic heart failure with preserved ejection fraction ([address]) 2023    Chronic kidney disease     COPD (chronic obstructive pulmonary disease) ([address]) 2015    mild-mod dz; Dr Singh     Diabetes [facility]) 2013    Dilated cardiomyopathy ([address])     Dyslipidemia     Gout     Heart attack ([address])

## 2023-12-14 NOTE — ED NOTES
Pts bed was soiled even though she had pure wick in place. Full linen change was completed and pt was assisted to restroom to urinate. Pt assisted back to bed and is now off floor to vascular for a study. Pt was placed on 2lpm upon departure. Pt left unit in stable condition .       Saritha Morris RN  12/14/23 9881

## 2023-12-14 NOTE — ED TRIAGE NOTES
Patient arrived via EMS from home. Patient c/o SOB and back pain for the past 3 days. Patient has a history of COPD. Patient reports taking albuterol inhaler multiple times today. Patient unable to report how many times she took her inhaler but reports that she has taken it all day today.

## 2023-12-14 NOTE — ED NOTES
Attempted to call report, was put on hold will call back again.      Antolin Garcia, RN  12/14/23 6870

## 2023-12-14 NOTE — ED NOTES
Nuc med held at this time until hearing back from hospitalist. Pt already went to CTA and vascular doppler. This nurse inquiring if VQ scan still needed.  This nurse asked Gorge Orourke,  to [place] hospitalist      Mohini Willard, RN  12/14/23 [address] Home [facility], [address]  12/14/23 5968

## 2023-12-14 NOTE — ED NOTES
This nurse asked  to page Dr Renay Ortiz to get clarification on lokelma as pts potassium is WNL.       Joselin Real RN  12/14/23 1978

## 2023-12-14 NOTE — ED NOTES
[facility] TRANSFER - OUT REPORT:    Verbal report given to [facility] [employer] on Marry Toledo  being transferred to [facility] for routine progression of patient care       Report consisted of patient's Situation, Background, Assessment and   Recommendations(SBAR). Information from the following report(s) ED SBAR was reviewed with the receiving nurse. [place] Assessment:    Presents to emergency department  because of falls (Syncope, seizure, or loss of consciousness): No  Age > 70: Yes  Altered Mental Status, Intoxication with alcohol or substance confusion (Disorientation, impaired judgment, poor safety awaremess, or inability to follow instructions): No  Impaired Mobility: Ambulates or transfers with assistive devices or assistance; Unable to ambulate or transer.: Yes  Nursing Judgement: Yes          Lines:   Peripheral IV 12/13/23 Posterior;Right Hand (Active)       Peripheral IV 12/13/23 Proximal;Right Forearm (Active)   Site Assessment Clean, dry & intact 12/13/23 2218   Line Status Blood return noted 12/13/23 2218   Phlebitis Assessment No symptoms 12/13/23 2218   Infiltration Assessment 0 12/13/23 2218        Opportunity for questions and clarification was provided.       Patient transported with:  Verta Gottron, RN  12/14/23 9706

## 2023-12-14 NOTE — ED TRIAGE NOTES
Pt arrived via EMS from home c/o SOB. History of COPD. Pt is on diuretic which she states she s compliant with. Pt states her legs are swollen. Also c/o right flank pain for several days.

## 2023-12-14 NOTE — ED PROVIDER NOTES
or deformities, has tenderness to the right SI joint, pelvis is stable, normal range of motion of hips, positive straight leg raise on the right, negative cross straight leg raise, patient able to hold both legs up for 5 seconds  Skin: No rashes  Neuro: Alert and conversive      DIAGNOSTIC RESULTS     EKG: All EKG's are interpreted by the Emergency Department Physician who either signs or Co-signs this chart in the absence of a cardiologist.    EKG shows a tachycardic sinus rhythm at a rate of 100 bpm.  QRS is narrow, axis is normal, R wave progression is pericardium is satisfactory. No specific ST elevation or depression noted but does have T wave inversions noted in aVL and possibly slightly in V1. A lot of artifact on the baseline which makes interpretation difficult but overall no signs of ACS or arrhythmia. RADIOLOGY:   Non-plain film images such as CT, Ultrasound and MRI are read by the radiologist. Plain radiographic images are visualized and preliminarily interpreted by the emergency physician with the below findings:    Chest x-ray shows improved edema compared to previous. Still has cardiomegaly. Interpretation per the Radiologist below, if available at the time of this note:    CTA CHEST [facility]   Final Result   No evidence for pulmonary emboli. Emphysema. CT abdomen and pelvis is reported separately. CT ABDOMEN PELVIS W IV CONTRAST Additional Contrast? None   Final Result      No acute findings. Primary anastomosis in the sigmoid colon without obstruction or focal bowel wall   thickening. Renal cysts. Uterine fibroids. Additional findings as above.       XR CHEST (2 VW)    (Results Pending)   Vascular duplex lower extremity venous bilateral    (Results Pending)   NM LUNG VENT/PERFUSION (VQ)    (Results Pending)         ED BEDSIDE ULTRASOUND:   Performed by ED Physician - none    LABS:  Labs Reviewed   CBC WITH AUTO DIFFERENTIAL - Abnormal; Notable for for further workup and evaluation. She has been on 2 L of oxygen for comfort. CRITICAL CARE TIME   Total Critical Care time was 0 minutes, excluding separately reportable procedures. There was a high probability of clinically significant/life threatening deterioration in the patient's condition which required my urgent intervention. CONSULTS:  None    PROCEDURES:  Unless otherwise noted below, none     Procedures      FINAL IMPRESSION      1. Dyspnea and respiratory abnormalities    2. Right-sided low back pain without sciatica, unspecified chronicity          DISPOSITION/PLAN   DISPOSITION Admitted 12/14/2023 01:50:28 AM      PATIENT REFERRED TO:  No follow-up provider specified.     DISCHARGE MEDICATIONS:  New Prescriptions    No medications on file     Controlled Substances Monitoring:          No data to display                (Please note that portions of this note were completed with a voice recognition program.  Efforts were made to edit the dictations but occasionally words are mis-transcribed.)    Juli Najjar, MD (electronically signed)  Attending Emergency Physician            Juli Najjar, MD  12/14/23 5453

## 2023-12-14 NOTE — ED NOTES
Patient's GFR is only 23 however she is high risk for PE and I would still like to proceed with a CTA of her chest to rule out PE versus to look for infection or pneumonia. Since we are doing a CT with contrast I would like to add on CT of her abdomen as well to look for any signs of any acute process going on in her right lower back. Spoke with radiology who agrees.      Yemi Mcmillan MD  12/13/23 6636

## 2023-12-14 NOTE — CONSULTS
nitroGLYCERIN (NITROSTAT) 0.4 MG SL tablet   No No   Sig: Place 1 tablet under the tongue every 5 minutes as needed for Chest pain up to max of 3 total doses. If no relief after 1 dose, call 911.    pantoprazole (PROTONIX) 40 MG tablet   No No   Sig: Take 1 tablet by mouth every morning (before breakfast)      Facility-Administered Medications: None       Current Facility-Administered Medications   Medication Dose Route Frequency    sodium chloride flush 0.9 % injection 5-40 mL  5-40 mL IntraVENous 2 times per day    sodium chloride flush 0.9 % injection 5-40 mL  5-40 mL IntraVENous PRN    0.9 % sodium chloride infusion   IntraVENous PRN    enoxaparin Sodium (LOVENOX) injection 30 mg  30 mg SubCUTAneous Daily    ondansetron (ZOFRAN-ODT) disintegrating tablet 4 mg  4 mg Oral Q8H PRN    Or    ondansetron (ZOFRAN) injection 4 mg  4 mg IntraVENous Q6H PRN    polyethylene glycol (GLYCOLAX) packet 17 g  17 g Oral Daily PRN    acetaminophen (TYLENOL) tablet 650 mg  650 mg Oral Q6H PRN    Or    acetaminophen (TYLENOL) suppository 650 mg  650 mg Rectal Q6H PRN    potassium chloride (KLOR-CON M) extended release tablet 40 mEq  40 mEq Oral PRN    Or    potassium bicarb-citric acid (EFFER-K) effervescent tablet 40 mEq  40 mEq Oral PRN    Or    potassium chloride 10 mEq/100 mL IVPB (Peripheral Line)  10 mEq IntraVENous PRN    magnesium sulfate 2000 mg in 50 mL IVPB premix  2,000 mg IntraVENous PRN    albuterol (PROVENTIL) (2.5 MG/3ML) 0.083% nebulizer solution   Nebulization Q6H PRN    aspirin chewable tablet 162 mg  162 mg Oral Daily    atorvastatin (LIPITOR) tablet 40 mg  40 mg Oral Nightly    gabapentin (NEURONTIN) capsule 400 mg  400 mg Oral QPM    hydrALAZINE (APRESOLINE) tablet 25 mg  25 mg Oral 3 times per day    metoprolol succinate (TOPROL XL) extended release tablet 25 mg  25 mg Oral Daily    pantoprazole (PROTONIX) tablet 40 mg  40 mg Oral QAM AC    ipratropium (ATROVENT) 0.02 % nebulizer solution 0.5 mg  0.5 mg Nebulization TID RT    And    arformoterol tartrate (BROVANA) nebulizer solution 15 mcg  15 mcg Nebulization BID RT    perflutren lipid microspheres (DEFINITY) injection 1.5 mL  1.5 mL IntraVENous ONCE PRN     Current Outpatient Medications   Medication Sig    Oxygen Concentrator Inhale 2 L into the lungs continuous. B Complex-C-Folic Acid (VIRT-CAPS) 1 MG CAPS Take 1 capsule by mouth daily    gabapentin (NEURONTIN) 400 MG capsule Take 1 capsule by mouth every evening. bumetanide (BUMEX) 2 MG tablet Take 1 tablet by mouth daily    insulin detemir (LEVEMIR) 100 UNIT/ML injection pen Inject 15 Units into the skin nightly    nitroGLYCERIN (NITROSTAT) 0.4 MG SL tablet Place 1 tablet under the tongue every 5 minutes as needed for Chest pain up to max of 3 total doses. If no relief after 1 dose, call 911.    atorvastatin (LIPITOR) 40 MG tablet Take 1 tablet by mouth nightly    hydrALAZINE (APRESOLINE) 25 MG tablet Take 1 tablet by mouth every 8 hours    metoprolol succinate (TOPROL XL) 25 MG extended release tablet Take 1 tablet by mouth daily    B Complex-C-Folic Acid (VIRT-CAPS) 1 MG CAPS Take 1 capsule by mouth daily    pantoprazole (PROTONIX) 40 MG tablet Take 1 tablet by mouth every morning (before breakfast)    albuterol sulfate HFA (PROVENTIL;VENTOLIN;PROAIR) 108 (90 Base) MCG/ACT inhaler Inhale 2 puffs into the lungs every 6 hours as needed    aspirin 81 MG chewable tablet Take 2 tablets by mouth daily    ipratropium-albuterol (DUONEB) 0.5-2.5 (3) MG/3ML SOLN nebulizer solution Inhale 3 mLs into the lungs every 6 hours as needed    Umeclidinium-Vilanterol 62.5-25 MCG/ACT AEPB Inhale 1 puff into the lungs daily       Review of Systems:     Complete 10-point review of systems were obtained and discussed in length  with the patient. Complete review of systems was negative/unremarkable  except as mentioned in HPI section.   Data Review:    Labs: Results:       Chemistry Recent Labs     12/13/23 2035

## 2023-12-14 NOTE — ED NOTES
Mers. Daisy He paged Dr. Henrietta Lowery, no call back after 20mins. She called her phone and she answered and This nurse spoke with Dr. Justus Thomson who states she will call nurse back. Awaiting call back to voice nurses concerns.       Verle Bumpers, RN  12/14/23 2038

## 2023-12-14 NOTE — CONSULTS
[facility] - Consult Note    Cardiology consultation request from Dr. Kelley Vieyra for evaluation and management/treatment of shortness of breath    Date of  Admission: 12/13/2023  7:20 PM   Primary Care Physician:  ESTRADA Yao - NP    Attending Cardiologist: Dr. Laura Garcia:     -Dyspnea, ? COPD exacerbation, CTA chest without evidence for pulmonary embolism, pleural effusion or pulmonary edema; respiratory virus panel negative  -CAD, s/p cardiac cath 8/9/2023 (Dr. Arely Montero) with findings as follows:  LM luminal irregularities, mLAD 20-30% stenosis, Diagonal 1 50-60% stenosis ostial small to medium size vessel, LCFX mid 20-30% stenosis, Ostial RCA 40% stenosis, mRCA 20-30% stenosis, rPL 20-30% stenosis  -Cardiomyopathy with recovered LVEF, EF as low as 35% since 2018  EF 50-55% 06/2022. EF this admission 35-40%    -CKD, requiring HD in 08/2023  -Anemia  -HTN, on bumex 2 mg daily. -HLD, on statin. -DM  -Hx tobacco abuse   -Obesity. Primary cardiologist is Dr. Lee Noland:     -Echocardiogram, repeat HS-troponin for completeness. Repeat troponin stable/flat at 40.  -Volume management per nephrology team, appreciate assistance, holding further diuretics. -Further recommendations based on test results, hospital course. History of Present Illness: This is a 72 y.o. female admitted for Dyspnea [R06.00]. Patient complains of: shortness of breath      Aquilino John is a 72 y.o. female who presented to the hospital due to acute onset shortness of breath that started while lying down last night. She reports that she sat up but that her shortness of breath was not improving, so she called EMS. She reports associated chest pressure, which has resolved. Breathing feeling better but not to baseline. She did not use her supplemental O2 which she uses PRN. She reports chronic shortness of breath with exertion, which is unchanged from baseline. She denies cough.   She reports

## 2023-12-15 LAB
ANION GAP SERPL CALC-SCNC: 5 MMOL/L (ref 3–18)
ANION GAP SERPL CALC-SCNC: 8 MMOL/L (ref 3–18)
BASOPHILS # BLD: 0 K/UL (ref 0–0.1)
BASOPHILS NFR BLD: 0 % (ref 0–2)
BUN SERPL-MCNC: 41 MG/DL (ref 7–18)
BUN SERPL-MCNC: 41 MG/DL (ref 7–18)
BUN/CREAT SERPL: 14 (ref 12–20)
BUN/CREAT SERPL: 14 (ref 12–20)
CALCIUM SERPL-MCNC: 9.6 MG/DL (ref 8.5–10.1)
CALCIUM SERPL-MCNC: 9.8 MG/DL (ref 8.5–10.1)
CHLORIDE SERPL-SCNC: 108 MMOL/L (ref 100–111)
CHLORIDE SERPL-SCNC: 109 MMOL/L (ref 100–111)
CO2 SERPL-SCNC: 19 MMOL/L (ref 21–32)
CO2 SERPL-SCNC: 21 MMOL/L (ref 21–32)
CREAT SERPL-MCNC: 2.92 MG/DL (ref 0.6–1.3)
CREAT SERPL-MCNC: 2.98 MG/DL (ref 0.6–1.3)
DIFFERENTIAL METHOD BLD: ABNORMAL
EOSINOPHIL # BLD: 0 K/UL (ref 0–0.4)
EOSINOPHIL NFR BLD: 0 % (ref 0–5)
ERYTHROCYTE [DISTWIDTH] IN BLOOD BY AUTOMATED COUNT: 19.9 % (ref 11.6–14.5)
GLUCOSE SERPL-MCNC: 198 MG/DL (ref 74–99)
GLUCOSE SERPL-MCNC: 227 MG/DL (ref 74–99)
HCT VFR BLD AUTO: 30.3 % (ref 35–45)
HGB BLD-MCNC: 9.2 G/DL (ref 12–16)
IMM GRANULOCYTES # BLD AUTO: 0.3 K/UL (ref 0–0.04)
IMM GRANULOCYTES NFR BLD AUTO: 4 % (ref 0–0.5)
LYMPHOCYTES # BLD: 0.5 K/UL (ref 0.9–3.6)
LYMPHOCYTES NFR BLD: 7 % (ref 21–52)
MCH RBC QN AUTO: 25.6 PG (ref 24–34)
MCHC RBC AUTO-ENTMCNC: 30.4 G/DL (ref 31–37)
MCV RBC AUTO: 84.4 FL (ref 78–100)
MONOCYTES # BLD: 0.2 K/UL (ref 0.05–1.2)
MONOCYTES NFR BLD: 3 % (ref 3–10)
NEUTS SEG # BLD: 5.9 K/UL (ref 1.8–8)
NEUTS SEG NFR BLD: 86 % (ref 40–73)
NRBC # BLD: 0.03 K/UL (ref 0–0.01)
NRBC BLD-RTO: 0.4 PER 100 WBC
PLATELET # BLD AUTO: 424 K/UL (ref 135–420)
PMV BLD AUTO: 9.5 FL (ref 9.2–11.8)
POTASSIUM SERPL-SCNC: 5.4 MMOL/L (ref 3.5–5.5)
POTASSIUM SERPL-SCNC: 6.2 MMOL/L (ref 3.5–5.5)
RBC # BLD AUTO: 3.59 M/UL (ref 4.2–5.3)
SODIUM SERPL-SCNC: 135 MMOL/L (ref 136–145)
SODIUM SERPL-SCNC: 135 MMOL/L (ref 136–145)
WBC # BLD AUTO: 6.9 K/UL (ref 4.6–13.2)

## 2023-12-15 PROCEDURE — 1100000003 HC PRIVATE W/ TELEMETRY

## 2023-12-15 PROCEDURE — 6370000000 HC RX 637 (ALT 250 FOR IP): Performed by: INTERNAL MEDICINE

## 2023-12-15 PROCEDURE — 2580000003 HC RX 258: Performed by: STUDENT IN AN ORGANIZED HEALTH CARE EDUCATION/TRAINING PROGRAM

## 2023-12-15 PROCEDURE — 80048 BASIC METABOLIC PNL TOTAL CA: CPT

## 2023-12-15 PROCEDURE — 36415 COLL VENOUS BLD VENIPUNCTURE: CPT

## 2023-12-15 PROCEDURE — 94761 N-INVAS EAR/PLS OXIMETRY MLT: CPT

## 2023-12-15 PROCEDURE — 85025 COMPLETE CBC W/AUTO DIFF WBC: CPT

## 2023-12-15 PROCEDURE — 94640 AIRWAY INHALATION TREATMENT: CPT

## 2023-12-15 PROCEDURE — 6360000002 HC RX W HCPCS: Performed by: STUDENT IN AN ORGANIZED HEALTH CARE EDUCATION/TRAINING PROGRAM

## 2023-12-15 PROCEDURE — 6370000000 HC RX 637 (ALT 250 FOR IP)

## 2023-12-15 PROCEDURE — 6370000000 HC RX 637 (ALT 250 FOR IP): Performed by: STUDENT IN AN ORGANIZED HEALTH CARE EDUCATION/TRAINING PROGRAM

## 2023-12-15 PROCEDURE — 6360000002 HC RX W HCPCS

## 2023-12-15 PROCEDURE — 99232 SBSQ HOSP IP/OBS MODERATE 35: CPT | Performed by: STUDENT IN AN ORGANIZED HEALTH CARE EDUCATION/TRAINING PROGRAM

## 2023-12-15 RX ORDER — MORPHINE SULFATE 4 MG/ML
4 INJECTION, SOLUTION INTRAMUSCULAR; INTRAVENOUS
Status: DISCONTINUED | OUTPATIENT
Start: 2023-12-15 | End: 2023-12-15

## 2023-12-15 RX ORDER — MORPHINE SULFATE 2 MG/ML
2 INJECTION, SOLUTION INTRAMUSCULAR; INTRAVENOUS
Status: DISCONTINUED | OUTPATIENT
Start: 2023-12-15 | End: 2023-12-15

## 2023-12-15 RX ADMIN — SODIUM CHLORIDE, PRESERVATIVE FREE 10 ML: 5 INJECTION INTRAVENOUS at 11:22

## 2023-12-15 RX ADMIN — HYDROMORPHONE HYDROCHLORIDE 0.5 MG: 1 INJECTION, SOLUTION INTRAMUSCULAR; INTRAVENOUS; SUBCUTANEOUS at 18:37

## 2023-12-15 RX ADMIN — HYDROMORPHONE HYDROCHLORIDE 0.5 MG: 1 INJECTION, SOLUTION INTRAMUSCULAR; INTRAVENOUS; SUBCUTANEOUS at 12:58

## 2023-12-15 RX ADMIN — ARFORMOTEROL TARTRATE 15 MCG: 15 SOLUTION RESPIRATORY (INHALATION) at 20:08

## 2023-12-15 RX ADMIN — SODIUM CHLORIDE 10 ML: 9 INJECTION, SOLUTION INTRAVENOUS at 22:56

## 2023-12-15 RX ADMIN — ATORVASTATIN CALCIUM 40 MG: 40 TABLET, FILM COATED ORAL at 22:56

## 2023-12-15 RX ADMIN — PREDNISONE 40 MG: 20 TABLET ORAL at 09:54

## 2023-12-15 RX ADMIN — PANTOPRAZOLE SODIUM 40 MG: 40 TABLET, DELAYED RELEASE ORAL at 06:29

## 2023-12-15 RX ADMIN — BUDESONIDE 0.5 MG: 1 SUSPENSION RESPIRATORY (INHALATION) at 20:08

## 2023-12-15 RX ADMIN — ASPIRIN 81 MG: 81 TABLET, CHEWABLE ORAL at 09:55

## 2023-12-15 RX ADMIN — GABAPENTIN 400 MG: 400 CAPSULE ORAL at 19:17

## 2023-12-15 RX ADMIN — APIXABAN 10 MG: 5 TABLET, FILM COATED ORAL at 22:56

## 2023-12-15 RX ADMIN — SODIUM ZIRCONIUM CYCLOSILICATE 10 G: 5 POWDER, FOR SUSPENSION ORAL at 06:25

## 2023-12-15 ASSESSMENT — PAIN SCALES - GENERAL
PAINLEVEL_OUTOF10: 0
PAINLEVEL_OUTOF10: 10
PAINLEVEL_OUTOF10: 0
PAINLEVEL_OUTOF10: 0
PAINLEVEL_OUTOF10: 10

## 2023-12-15 ASSESSMENT — PAIN DESCRIPTION - LOCATION
LOCATION: LEG;BACK
LOCATION: BACK;LEG

## 2023-12-15 ASSESSMENT — PAIN DESCRIPTION - ORIENTATION
ORIENTATION: LEFT;RIGHT;LOWER
ORIENTATION: LEFT;RIGHT

## 2023-12-15 ASSESSMENT — PAIN DESCRIPTION - DESCRIPTORS
DESCRIPTORS: ACHING;DISCOMFORT
DESCRIPTORS: ACHING

## 2023-12-15 NOTE — CARE COORDINATION
Case Management Assessment  Initial Evaluation    Date/Time of Evaluation: 12/15/2023 12:19 PM  Assessment Completed by: Adolfo Aguirre    If patient is discharged prior to next notation, then this note serves as note for discharge by case management. Patient Name: Arlette Pride                   YOB: 1950  Diagnosis: Dyspnea [R06.00]  Dyspnea on exertion [R06.09]  Dyspnea and respiratory abnormalities [R06.00, R06.89]  Coronary artery disease involving native coronary artery of native heart with unstable angina pectoris ([address]) [I25.110]  Right-sided low back pain without sciatica, unspecified chronicity [M54.50]                   Date / Time: 12/13/2023  7:20 PM    Patient Admission Status: Inpatient   Readmission Risk (Low < 19, Mod (19-27), High > 27): Readmission Risk Score: 22.3    Current PCP: ESTRADA Polk NP  PCP verified by CM? Yes    Chart Reviewed: Yes      History Provided by: Patient  Patient Orientation: Alert and Oriented    Patient Cognition: Alert    Hospitalization in the last 30 days (Readmission):  No    If yes, Readmission Assessment in CM Navigator will be completed.     Advance Directives:      Code Status: Full Code   Patient's Primary Decision Maker is: Named in Scanned ACP Document    Primary Decision Maker: Ana Eaton - [phone]    Discharge Planning:    Patient lives with: (P) Spouse/Significant Other Type of Home: (P) House  Primary Care Giver: Self  Patient Support Systems include: Spouse/Significant Other   Current Financial resources: Medicare  Current community resources: None  Current services prior to admission: (P) Durable Medical Equipment            Current DME: (P) Cane, Oxygen Therapy (Comment), [place] (Patient reports using Oxygen 2 Liters as needed from [address])            Type of Home Care services:  (P) None (Patient is refusing any home health services at this time)    ADLS  Prior functional level: Assistance with the artery of native heart with unstable angina pectoris ([facility]) [I25.110]  Right-sided low back pain without sciatica, unspecified chronicity [J06.04]    IF APPLICABLE: The Patient and/or patient representative Teddy Plummer and her family were provided with a choice of provider and agrees with the discharge plan. Freedom of choice list with basic dialogue that supports the patient's individualized plan of care/goals and shares the quality data associated with the providers was provided to:     Patient Representative Name:       The Patient and/or Patient Representative Agree with the Discharge Plan?           12/15/23 1203   Service Assessment   Patient Orientation Alert and Oriented   Cognition Alert   History Provided By Patient   Primary Caregiver [address] [place] [address] is: Named in [address]   PCP Verified by CM Yes   Last Visit to PCP Within last 3 months   Prior Functional Level Assistance with the following:;Bathing   Current Functional Level Assistance with the following:;Bathing   Can patient return to prior living arrangement Yes   Ability to make needs known: Good   Family able to assist with home care needs: Other (comment)  (Spouse is able to assist)   Financial Resources Medicare   Community Resources None   Social/Functional History   Lives With Spouse   Type of [address] [place] [address] Dr One level   [address] to enter with rails   Entrance Stairs - Number of Steps 4   Entrance Stairs - Rails Both   Bathroom Shower/Tub Tub/Shower unit  (Patient reports unable to use tub, she washes up in the sink.  Unable to go in and out of the tub shower.)   Bathroom Toilet Standard  (Patient is requesting a high rise toilet seat)   Bathroom Equipment None  (would like to get a high rise toilet)   Bathroom Accessibility Walker accessible  (tub shower not accessible)   Home Equipment Cane;armond Herr   Receives Help From Other

## 2023-12-16 VITALS
HEART RATE: 98 BPM | BODY MASS INDEX: 33.29 KG/M2 | WEIGHT: 195 LBS | DIASTOLIC BLOOD PRESSURE: 65 MMHG | RESPIRATION RATE: 20 BRPM | TEMPERATURE: 98 F | HEIGHT: 64 IN | OXYGEN SATURATION: 94 % | SYSTOLIC BLOOD PRESSURE: 119 MMHG

## 2023-12-16 LAB
ANION GAP SERPL CALC-SCNC: 7 MMOL/L (ref 3–18)
BASOPHILS # BLD: 0 K/UL (ref 0–0.1)
BASOPHILS NFR BLD: 0 % (ref 0–2)
BUN SERPL-MCNC: 56 MG/DL (ref 7–18)
BUN/CREAT SERPL: 18 (ref 12–20)
CALCIUM SERPL-MCNC: 9.2 MG/DL (ref 8.5–10.1)
CHLORIDE SERPL-SCNC: 106 MMOL/L (ref 100–111)
CO2 SERPL-SCNC: 21 MMOL/L (ref 21–32)
CREAT SERPL-MCNC: 3.04 MG/DL (ref 0.6–1.3)
DIFFERENTIAL METHOD BLD: ABNORMAL
EOSINOPHIL # BLD: 0 K/UL (ref 0–0.4)
EOSINOPHIL NFR BLD: 0 % (ref 0–5)
ERYTHROCYTE [DISTWIDTH] IN BLOOD BY AUTOMATED COUNT: 19.9 % (ref 11.6–14.5)
GLUCOSE BLD STRIP.AUTO-MCNC: 179 MG/DL (ref 70–110)
GLUCOSE SERPL-MCNC: 169 MG/DL (ref 74–99)
HCT VFR BLD AUTO: 27.5 % (ref 35–45)
HGB BLD-MCNC: 8.5 G/DL (ref 12–16)
IMM GRANULOCYTES # BLD AUTO: 0.4 K/UL (ref 0–0.04)
IMM GRANULOCYTES NFR BLD AUTO: 4 % (ref 0–0.5)
LYMPHOCYTES # BLD: 0.8 K/UL (ref 0.9–3.6)
LYMPHOCYTES NFR BLD: 7 % (ref 21–52)
MCH RBC QN AUTO: 25.8 PG (ref 24–34)
MCHC RBC AUTO-ENTMCNC: 30.9 G/DL (ref 31–37)
MCV RBC AUTO: 83.6 FL (ref 78–100)
MONOCYTES # BLD: 0.9 K/UL (ref 0.05–1.2)
MONOCYTES NFR BLD: 8 % (ref 3–10)
NEUTS SEG # BLD: 9.2 K/UL (ref 1.8–8)
NEUTS SEG NFR BLD: 81 % (ref 40–73)
NRBC # BLD: 0.08 K/UL (ref 0–0.01)
NRBC BLD-RTO: 0.7 PER 100 WBC
PLATELET # BLD AUTO: 425 K/UL (ref 135–420)
PMV BLD AUTO: 9.5 FL (ref 9.2–11.8)
POTASSIUM SERPL-SCNC: 5.5 MMOL/L (ref 3.5–5.5)
RBC # BLD AUTO: 3.29 M/UL (ref 4.2–5.3)
SODIUM SERPL-SCNC: 134 MMOL/L (ref 136–145)
WBC # BLD AUTO: 11.3 K/UL (ref 4.6–13.2)

## 2023-12-16 PROCEDURE — 6370000000 HC RX 637 (ALT 250 FOR IP): Performed by: STUDENT IN AN ORGANIZED HEALTH CARE EDUCATION/TRAINING PROGRAM

## 2023-12-16 PROCEDURE — 36415 COLL VENOUS BLD VENIPUNCTURE: CPT

## 2023-12-16 PROCEDURE — 99239 HOSP IP/OBS DSCHRG MGMT >30: CPT | Performed by: STUDENT IN AN ORGANIZED HEALTH CARE EDUCATION/TRAINING PROGRAM

## 2023-12-16 PROCEDURE — 6370000000 HC RX 637 (ALT 250 FOR IP)

## 2023-12-16 PROCEDURE — 82962 GLUCOSE BLOOD TEST: CPT

## 2023-12-16 PROCEDURE — 80048 BASIC METABOLIC PNL TOTAL CA: CPT

## 2023-12-16 PROCEDURE — 6360000002 HC RX W HCPCS: Performed by: STUDENT IN AN ORGANIZED HEALTH CARE EDUCATION/TRAINING PROGRAM

## 2023-12-16 PROCEDURE — 6370000000 HC RX 637 (ALT 250 FOR IP): Performed by: INTERNAL MEDICINE

## 2023-12-16 PROCEDURE — 85025 COMPLETE CBC W/AUTO DIFF WBC: CPT

## 2023-12-16 PROCEDURE — 2580000003 HC RX 258: Performed by: STUDENT IN AN ORGANIZED HEALTH CARE EDUCATION/TRAINING PROGRAM

## 2023-12-16 RX ORDER — BUMETANIDE 2 MG/1
2 TABLET ORAL EVERY OTHER DAY
Qty: 30 TABLET | Refills: 3 | Status: SHIPPED | OUTPATIENT
Start: 2023-12-16

## 2023-12-16 RX ORDER — ASPIRIN 81 MG/1
81 TABLET, CHEWABLE ORAL DAILY
Qty: 30 TABLET | Refills: 3 | Status: SHIPPED | OUTPATIENT
Start: 2023-12-17

## 2023-12-16 RX ORDER — PREDNISONE 20 MG/1
40 TABLET ORAL DAILY
Qty: 4 TABLET | Refills: 0 | Status: SHIPPED | OUTPATIENT
Start: 2023-12-17 | End: 2023-12-19

## 2023-12-16 RX ADMIN — HYDROMORPHONE HYDROCHLORIDE 0.5 MG: 1 INJECTION, SOLUTION INTRAMUSCULAR; INTRAVENOUS; SUBCUTANEOUS at 16:01

## 2023-12-16 RX ADMIN — SODIUM CHLORIDE, PRESERVATIVE FREE 10 ML: 5 INJECTION INTRAVENOUS at 08:35

## 2023-12-16 RX ADMIN — METOPROLOL SUCCINATE 25 MG: 25 TABLET, EXTENDED RELEASE ORAL at 08:33

## 2023-12-16 RX ADMIN — HYDROMORPHONE HYDROCHLORIDE 0.5 MG: 1 INJECTION, SOLUTION INTRAMUSCULAR; INTRAVENOUS; SUBCUTANEOUS at 08:37

## 2023-12-16 RX ADMIN — HYDROMORPHONE HYDROCHLORIDE 0.5 MG: 1 INJECTION, SOLUTION INTRAMUSCULAR; INTRAVENOUS; SUBCUTANEOUS at 01:56

## 2023-12-16 RX ADMIN — SODIUM ZIRCONIUM CYCLOSILICATE 10 G: 5 POWDER, FOR SUSPENSION ORAL at 08:36

## 2023-12-16 RX ADMIN — PREDNISONE 40 MG: 20 TABLET ORAL at 08:33

## 2023-12-16 RX ADMIN — ASPIRIN 81 MG: 81 TABLET, CHEWABLE ORAL at 08:34

## 2023-12-16 RX ADMIN — APIXABAN 10 MG: 5 TABLET, FILM COATED ORAL at 08:34

## 2023-12-16 RX ADMIN — PANTOPRAZOLE SODIUM 40 MG: 40 TABLET, DELAYED RELEASE ORAL at 08:34

## 2023-12-16 ASSESSMENT — PAIN SCALES - GENERAL
PAINLEVEL_OUTOF10: 8
PAINLEVEL_OUTOF10: 0
PAINLEVEL_OUTOF10: 10
PAINLEVEL_OUTOF10: 6
PAINLEVEL_OUTOF10: 0

## 2023-12-16 ASSESSMENT — PAIN DESCRIPTION - LOCATION
LOCATION: BACK
LOCATION: LEG
LOCATION: LEG

## 2023-12-16 ASSESSMENT — PAIN DESCRIPTION - DESCRIPTORS
DESCRIPTORS: ACHING
DESCRIPTORS: ACHING

## 2023-12-16 ASSESSMENT — PAIN DESCRIPTION - ORIENTATION
ORIENTATION: RIGHT;LEFT
ORIENTATION: RIGHT;LEFT

## 2023-12-17 LAB
ARTERIAL PATENCY WRIST A: POSITIVE
BACTERIA SPEC CULT: NORMAL
BASE DEFICIT BLD-SCNC: 4.4 MMOL/L
BDY SITE: ABNORMAL
GAS FLOW.O2 O2 DELIVERY SYS: ABNORMAL
HCO3 BLD-SCNC: 19.1 MMOL/L (ref 22–26)
O2/TOTAL GAS SETTING VFR VENT: 21 %
PCO2 BLD: 29.1 MMHG (ref 35–45)
PH BLD: 7.42 (ref 7.35–7.45)
PO2 BLD: 74 MMHG (ref 80–100)
RESPIRATORY RATE, POC: 22 (ref 5–40)
SAO2 % BLD: 95.4 % (ref 92–97)
SERVICE CMNT-IMP: ABNORMAL
SERVICE CMNT-IMP: NORMAL
SPECIMEN TYPE: ABNORMAL

## 2023-12-23 ENCOUNTER — APPOINTMENT (OUTPATIENT)
Facility: HOSPITAL | Age: 73
DRG: 811 | End: 2023-12-23
Payer: MEDICARE

## 2023-12-23 ENCOUNTER — HOSPITAL ENCOUNTER (INPATIENT)
Facility: HOSPITAL | Age: 73
LOS: 4 days | Discharge: HOME | DRG: 811 | End: 2023-12-27
Attending: EMERGENCY MEDICINE | Admitting: INTERNAL MEDICINE
Payer: MEDICARE

## 2023-12-23 DIAGNOSIS — M17.0 OSTEOARTHRITIS OF BOTH KNEES, UNSPECIFIED OSTEOARTHRITIS TYPE: ICD-10-CM

## 2023-12-23 DIAGNOSIS — D64.9 ANEMIA, UNSPECIFIED TYPE: ICD-10-CM

## 2023-12-23 DIAGNOSIS — U07.1 COVID: Primary | ICD-10-CM

## 2023-12-23 PROBLEM — K92.1 MELENA: Status: ACTIVE | Noted: 2023-12-23

## 2023-12-23 PROBLEM — K92.2 GI BLEED: Status: ACTIVE | Noted: 2023-12-23

## 2023-12-23 LAB
ALBUMIN SERPL-MCNC: 2.7 G/DL (ref 3.4–5)
ALBUMIN/GLOB SERPL: 0.9 (ref 0.8–1.7)
ALP SERPL-CCNC: 89 U/L (ref 45–117)
ALT SERPL-CCNC: 13 U/L (ref 13–56)
ANION GAP SERPL CALC-SCNC: 6 MMOL/L (ref 3–18)
AST SERPL-CCNC: 19 U/L (ref 10–38)
BASOPHILS # BLD: 0 K/UL (ref 0–0.1)
BASOPHILS NFR BLD: 0 % (ref 0–2)
BILIRUB SERPL-MCNC: 0.6 MG/DL (ref 0.2–1)
BUN SERPL-MCNC: 53 MG/DL (ref 7–18)
BUN/CREAT SERPL: 27 (ref 12–20)
CALCIUM SERPL-MCNC: 8.4 MG/DL (ref 8.5–10.1)
CHLORIDE SERPL-SCNC: 111 MMOL/L (ref 100–111)
CO2 SERPL-SCNC: 21 MMOL/L (ref 21–32)
CREAT SERPL-MCNC: 1.93 MG/DL (ref 0.6–1.3)
DIFFERENTIAL METHOD BLD: ABNORMAL
EOSINOPHIL # BLD: 0 K/UL (ref 0–0.4)
EOSINOPHIL NFR BLD: 0 % (ref 0–5)
ERYTHROCYTE [DISTWIDTH] IN BLOOD BY AUTOMATED COUNT: 21.2 % (ref 11.6–14.5)
ERYTHROCYTE [DISTWIDTH] IN BLOOD BY AUTOMATED COUNT: 22.4 % (ref 11.6–14.5)
FLUAV RNA SPEC QL NAA+PROBE: NOT DETECTED
FLUBV RNA SPEC QL NAA+PROBE: NOT DETECTED
GLOBULIN SER CALC-MCNC: 2.9 G/DL (ref 2–4)
GLUCOSE SERPL-MCNC: 142 MG/DL (ref 74–99)
HCT VFR BLD AUTO: 19 % (ref 35–45)
HCT VFR BLD AUTO: 20.1 % (ref 35–45)
HEMOCCULT STL QL: POSITIVE
HGB BLD-MCNC: 6.1 G/DL (ref 12–16)
HGB BLD-MCNC: 6.1 G/DL (ref 12–16)
HISTORY CHECK: NORMAL
IMM GRANULOCYTES # BLD AUTO: 0.5 K/UL (ref 0–0.04)
IMM GRANULOCYTES NFR BLD AUTO: 5 % (ref 0–0.5)
LYMPHOCYTES # BLD: 1.4 K/UL (ref 0.9–3.6)
LYMPHOCYTES NFR BLD: 15 % (ref 21–52)
MAGNESIUM SERPL-MCNC: 1.7 MG/DL (ref 1.6–2.6)
MCH RBC QN AUTO: 26.2 PG (ref 24–34)
MCH RBC QN AUTO: 27.9 PG (ref 24–34)
MCHC RBC AUTO-ENTMCNC: 30.3 G/DL (ref 31–37)
MCHC RBC AUTO-ENTMCNC: 32.1 G/DL (ref 31–37)
MCV RBC AUTO: 86.3 FL (ref 78–100)
MCV RBC AUTO: 86.8 FL (ref 78–100)
MONOCYTES # BLD: 0.9 K/UL (ref 0.05–1.2)
MONOCYTES NFR BLD: 10 % (ref 3–10)
NEUTS SEG # BLD: 6.3 K/UL (ref 1.8–8)
NEUTS SEG NFR BLD: 70 % (ref 40–73)
NRBC # BLD: 0.31 K/UL (ref 0–0.01)
NRBC # BLD: 0.34 K/UL (ref 0–0.01)
NRBC BLD-RTO: 3.4 PER 100 WBC
NRBC BLD-RTO: 3.7 PER 100 WBC
NT PRO BNP: 1318 PG/ML (ref 0–900)
PLATELET # BLD AUTO: 229 K/UL (ref 135–420)
PLATELET # BLD AUTO: 317 K/UL (ref 135–420)
PLATELET COMMENT: ABNORMAL
PMV BLD AUTO: 9.7 FL (ref 9.2–11.8)
PMV BLD AUTO: 9.8 FL (ref 9.2–11.8)
POTASSIUM SERPL-SCNC: 4.2 MMOL/L (ref 3.5–5.5)
PROT SERPL-MCNC: 5.6 G/DL (ref 6.4–8.2)
RBC # BLD AUTO: 2.19 M/UL (ref 4.2–5.3)
RBC # BLD AUTO: 2.33 M/UL (ref 4.2–5.3)
RBC MORPH BLD: ABNORMAL
SARS-COV-2 RNA RESP QL NAA+PROBE: DETECTED
SODIUM SERPL-SCNC: 138 MMOL/L (ref 136–145)
TROPONIN I SERPL HS-MCNC: 49 NG/L (ref 0–54)
WBC # BLD AUTO: 9.1 K/UL (ref 4.6–13.2)
WBC # BLD AUTO: 9.3 K/UL (ref 4.6–13.2)

## 2023-12-23 PROCEDURE — 86900 BLOOD TYPING SEROLOGIC ABO: CPT

## 2023-12-23 PROCEDURE — A4216 STERILE WATER/SALINE, 10 ML: HCPCS | Performed by: STUDENT IN AN ORGANIZED HEALTH CARE EDUCATION/TRAINING PROGRAM

## 2023-12-23 PROCEDURE — 6370000000 HC RX 637 (ALT 250 FOR IP): Performed by: INTERNAL MEDICINE

## 2023-12-23 PROCEDURE — 93005 ELECTROCARDIOGRAM TRACING: CPT | Performed by: EMERGENCY MEDICINE

## 2023-12-23 PROCEDURE — 85027 COMPLETE CBC AUTOMATED: CPT

## 2023-12-23 PROCEDURE — 86901 BLOOD TYPING SEROLOGIC RH(D): CPT

## 2023-12-23 PROCEDURE — 86850 RBC ANTIBODY SCREEN: CPT

## 2023-12-23 PROCEDURE — 80053 COMPREHEN METABOLIC PANEL: CPT

## 2023-12-23 PROCEDURE — 84484 ASSAY OF TROPONIN QUANT: CPT

## 2023-12-23 PROCEDURE — P9016 RBC LEUKOCYTES REDUCED: HCPCS

## 2023-12-23 PROCEDURE — 6360000002 HC RX W HCPCS: Performed by: INTERNAL MEDICINE

## 2023-12-23 PROCEDURE — 2580000003 HC RX 258: Performed by: INTERNAL MEDICINE

## 2023-12-23 PROCEDURE — C9113 INJ PANTOPRAZOLE SODIUM, VIA: HCPCS | Performed by: STUDENT IN AN ORGANIZED HEALTH CARE EDUCATION/TRAINING PROGRAM

## 2023-12-23 PROCEDURE — 83735 ASSAY OF MAGNESIUM: CPT

## 2023-12-23 PROCEDURE — 6360000002 HC RX W HCPCS: Performed by: STUDENT IN AN ORGANIZED HEALTH CARE EDUCATION/TRAINING PROGRAM

## 2023-12-23 PROCEDURE — 2580000003 HC RX 258: Performed by: STUDENT IN AN ORGANIZED HEALTH CARE EDUCATION/TRAINING PROGRAM

## 2023-12-23 PROCEDURE — 87636 SARSCOV2 & INF A&B AMP PRB: CPT

## 2023-12-23 PROCEDURE — 30233N1 TRANSFUSION OF NONAUTOLOGOUS RED BLOOD CELLS INTO PERIPHERAL VEIN, PERCUTANEOUS APPROACH: ICD-10-PCS | Performed by: FAMILY MEDICINE

## 2023-12-23 PROCEDURE — 85025 COMPLETE CBC W/AUTO DIFF WBC: CPT

## 2023-12-23 PROCEDURE — 83880 ASSAY OF NATRIURETIC PEPTIDE: CPT

## 2023-12-23 PROCEDURE — 99285 EMERGENCY DEPT VISIT HI MDM: CPT

## 2023-12-23 PROCEDURE — 86923 COMPATIBILITY TEST ELECTRIC: CPT

## 2023-12-23 PROCEDURE — 71045 X-RAY EXAM CHEST 1 VIEW: CPT

## 2023-12-23 PROCEDURE — 99222 1ST HOSP IP/OBS MODERATE 55: CPT | Performed by: INTERNAL MEDICINE

## 2023-12-23 PROCEDURE — 82272 OCCULT BLD FECES 1-3 TESTS: CPT

## 2023-12-23 PROCEDURE — 1100000000 HC RM PRIVATE

## 2023-12-23 RX ORDER — ACETAMINOPHEN 325 MG/1
650 TABLET ORAL EVERY 6 HOURS PRN
Status: DISCONTINUED | OUTPATIENT
Start: 2023-12-23 | End: 2023-12-27 | Stop reason: HOSPADM

## 2023-12-23 RX ORDER — SODIUM CHLORIDE 0.9 % (FLUSH) 0.9 %
5-40 SYRINGE (ML) INJECTION PRN
Status: DISCONTINUED | OUTPATIENT
Start: 2023-12-23 | End: 2023-12-27 | Stop reason: HOSPADM

## 2023-12-23 RX ORDER — ATORVASTATIN CALCIUM 40 MG/1
40 TABLET, FILM COATED ORAL NIGHTLY
Status: DISCONTINUED | OUTPATIENT
Start: 2023-12-23 | End: 2023-12-27 | Stop reason: HOSPADM

## 2023-12-23 RX ORDER — SODIUM CHLORIDE 9 MG/ML
INJECTION, SOLUTION INTRAVENOUS PRN
Status: DISCONTINUED | OUTPATIENT
Start: 2023-12-23 | End: 2023-12-27 | Stop reason: HOSPADM

## 2023-12-23 RX ORDER — ARFORMOTEROL TARTRATE 15 UG/2ML
15 SOLUTION RESPIRATORY (INHALATION)
Status: DISCONTINUED | OUTPATIENT
Start: 2023-12-23 | End: 2023-12-26

## 2023-12-23 RX ORDER — ONDANSETRON 4 MG/1
4 TABLET, ORALLY DISINTEGRATING ORAL EVERY 8 HOURS PRN
Status: DISCONTINUED | OUTPATIENT
Start: 2023-12-23 | End: 2023-12-27 | Stop reason: HOSPADM

## 2023-12-23 RX ORDER — POLYETHYLENE GLYCOL 3350 17 G/17G
17 POWDER, FOR SOLUTION ORAL DAILY PRN
Status: DISCONTINUED | OUTPATIENT
Start: 2023-12-23 | End: 2023-12-27 | Stop reason: HOSPADM

## 2023-12-23 RX ORDER — SODIUM CHLORIDE 0.9 % (FLUSH) 0.9 %
5-40 SYRINGE (ML) INJECTION EVERY 12 HOURS SCHEDULED
Status: DISCONTINUED | OUTPATIENT
Start: 2023-12-23 | End: 2023-12-27 | Stop reason: HOSPADM

## 2023-12-23 RX ORDER — METOPROLOL SUCCINATE 25 MG/1
25 TABLET, EXTENDED RELEASE ORAL DAILY
Status: DISCONTINUED | OUTPATIENT
Start: 2023-12-23 | End: 2023-12-27 | Stop reason: HOSPADM

## 2023-12-23 RX ORDER — SODIUM CHLORIDE 0.9 % (FLUSH) 0.9 %
5-40 SYRINGE (ML) INJECTION EVERY 12 HOURS SCHEDULED
Status: DISCONTINUED | OUTPATIENT
Start: 2023-12-23 | End: 2023-12-26

## 2023-12-23 RX ORDER — ONDANSETRON 2 MG/ML
4 INJECTION INTRAMUSCULAR; INTRAVENOUS EVERY 6 HOURS PRN
Status: DISCONTINUED | OUTPATIENT
Start: 2023-12-23 | End: 2023-12-27 | Stop reason: HOSPADM

## 2023-12-23 RX ORDER — SODIUM CHLORIDE, SODIUM LACTATE, POTASSIUM CHLORIDE, CALCIUM CHLORIDE 600; 310; 30; 20 MG/100ML; MG/100ML; MG/100ML; MG/100ML
INJECTION, SOLUTION INTRAVENOUS CONTINUOUS
Status: DISPENSED | OUTPATIENT
Start: 2023-12-23 | End: 2023-12-24

## 2023-12-23 RX ORDER — ASPIRIN 81 MG/1
324 TABLET, CHEWABLE ORAL DAILY
Status: DISCONTINUED | OUTPATIENT
Start: 2023-12-23 | End: 2023-12-23

## 2023-12-23 RX ORDER — ACETAMINOPHEN 650 MG/1
650 SUPPOSITORY RECTAL EVERY 6 HOURS PRN
Status: DISCONTINUED | OUTPATIENT
Start: 2023-12-23 | End: 2023-12-27 | Stop reason: HOSPADM

## 2023-12-23 RX ORDER — IPRATROPIUM BROMIDE AND ALBUTEROL SULFATE 2.5; .5 MG/3ML; MG/3ML
1 SOLUTION RESPIRATORY (INHALATION) EVERY 4 HOURS PRN
Status: DISCONTINUED | OUTPATIENT
Start: 2023-12-23 | End: 2023-12-26

## 2023-12-23 RX ORDER — GABAPENTIN 400 MG/1
400 CAPSULE ORAL EVERY EVENING
Status: DISCONTINUED | OUTPATIENT
Start: 2023-12-23 | End: 2023-12-27 | Stop reason: HOSPADM

## 2023-12-23 RX ORDER — BUDESONIDE 1 MG/2ML
1 INHALANT ORAL 2 TIMES DAILY
Status: DISCONTINUED | OUTPATIENT
Start: 2023-12-23 | End: 2023-12-26

## 2023-12-23 RX ADMIN — SODIUM CHLORIDE 80 MG: 9 INJECTION, SOLUTION INTRAMUSCULAR; INTRAVENOUS; SUBCUTANEOUS at 19:35

## 2023-12-23 RX ADMIN — SODIUM CHLORIDE, PRESERVATIVE FREE 10 ML: 5 INJECTION INTRAVENOUS at 22:51

## 2023-12-23 RX ADMIN — ATORVASTATIN CALCIUM 40 MG: 40 TABLET, FILM COATED ORAL at 22:17

## 2023-12-23 RX ADMIN — ARFORMOTEROL TARTRATE 15 MCG: 15 SOLUTION RESPIRATORY (INHALATION) at 22:20

## 2023-12-23 RX ADMIN — IPRATROPIUM BROMIDE AND ALBUTEROL SULFATE 1 DOSE: .5; 3 SOLUTION RESPIRATORY (INHALATION) at 22:18

## 2023-12-23 RX ADMIN — SODIUM CHLORIDE, POTASSIUM CHLORIDE, SODIUM LACTATE AND CALCIUM CHLORIDE: 600; 310; 30; 20 INJECTION, SOLUTION INTRAVENOUS at 22:24

## 2023-12-23 RX ADMIN — GABAPENTIN 400 MG: 400 CAPSULE ORAL at 22:16

## 2023-12-23 RX ADMIN — ACETAMINOPHEN 325MG 650 MG: 325 TABLET ORAL at 22:17

## 2023-12-23 RX ADMIN — BUDESONIDE 1 MG: 1 SUSPENSION RESPIRATORY (INHALATION) at 22:18

## 2023-12-23 RX ADMIN — SODIUM CHLORIDE, PRESERVATIVE FREE 10 ML: 5 INJECTION INTRAVENOUS at 20:19

## 2023-12-23 NOTE — ED PROVIDER NOTES
EMERGENCY DEPARTMENT HISTORY AND PHYSICAL EXAM      Patient Name: Jojo Hinton  MRN: [id number]  YOB: 1950  Provider: Scott Mcwilliams MD  PCP: ESTRADA Brito NP   Time/Date of evaluation: 5:38 PM EST on 12/23/23    History of Presenting Illness     No chief complaint on file. History Provided By: EMS and Patient     History Panda Ayala):   Jojo Hinton is a 72 y.o. female with a PMHX of dilated cardiomyopathy, dyslipidemia, DM, hypertension, gout, anemia orthostatic hypotension, COPD, CKD, CHF  who presents to the emergency department  by EMS C/O anxiety, SOB, abdominal pain onset this AM at 0700. Patient was recently discharged in the hospital and started on Eliquis at that time. States that she has had some intermittent abdominal pain, without fevers or significant diarrhea. She is taken all of her medications as prescribed.         Past History     Past Medical History:  Past Medical History:   Diagnosis Date    Anemia     Chondromalacia of both patellae     Chronic heart failure with preserved ejection fraction ([facility]) 01/13/2023    Chronic kidney disease     COPD (chronic obstructive pulmonary disease) ([facility]) 09/2015    mild-mod dz; [facility]) 2013    Dilated cardiomyopathy ([facility])     Dyslipidemia     Gout     Heart attack ([facility]) 10/18/2019    Stented    Hypercholesteremia 01/08/2014    Hypertension 2000    Mild protein-calorie malnutrition ([facility]) 8/7/2023    Obesity     Orthostatic hypotension 05/17/2016    Osteoarthritis of both knees     Popliteal cyst     Sleep apnea     not using cpap    Stage 3b chronic kidney disease ([facility]) 08/08/2022    Vitamin D deficiency 10/16/2014       Past Surgical History:  Past Surgical History:   Procedure Laterality Date    CARDIAC CATHETERIZATION  2019    Stents x 2    CARDIAC PROCEDURE N/A 8/9/2023    Left heart cath performed by Roxanne Isabel MD at [facility] N/A 8/9/2023    Coronary angiography

## 2023-12-23 NOTE — ED TRIAGE NOTES
From home, reports having sob and abdominal pain over past month, worse today. Client used MDI with moderate results. 97% on scene per ems. FSBS of 225. Client ambulatory to ems vehicle. 99.8T per ems.

## 2023-12-24 LAB
ALBUMIN SERPL-MCNC: 2.4 G/DL (ref 3.4–5)
ALBUMIN/GLOB SERPL: 0.8 (ref 0.8–1.7)
ALP SERPL-CCNC: 71 U/L (ref 45–117)
ALT SERPL-CCNC: 12 U/L (ref 13–56)
ANION GAP SERPL CALC-SCNC: 8 MMOL/L (ref 3–18)
APTT PPP: 35.6 SEC (ref 23–36.4)
AST SERPL-CCNC: 14 U/L (ref 10–38)
BASOPHILS # BLD: 0 K/UL (ref 0–0.1)
BASOPHILS NFR BLD: 0 % (ref 0–2)
BILIRUB SERPL-MCNC: 1.6 MG/DL (ref 0.2–1)
BUN SERPL-MCNC: 60 MG/DL (ref 7–18)
BUN/CREAT SERPL: 29 (ref 12–20)
CALCIUM SERPL-MCNC: 8.4 MG/DL (ref 8.5–10.1)
CHLORIDE SERPL-SCNC: 114 MMOL/L (ref 100–111)
CO2 SERPL-SCNC: 20 MMOL/L (ref 21–32)
CREAT SERPL-MCNC: 2.09 MG/DL (ref 0.6–1.3)
DIFFERENTIAL METHOD BLD: ABNORMAL
EKG ATRIAL RATE: 108 BPM
EKG DIAGNOSIS: NORMAL
EKG P AXIS: 73 DEGREES
EKG P-R INTERVAL: 156 MS
EKG Q-T INTERVAL: 326 MS
EKG QRS DURATION: 80 MS
EKG QTC CALCULATION (BAZETT): 436 MS
EKG R AXIS: 25 DEGREES
EKG T AXIS: 88 DEGREES
EKG VENTRICULAR RATE: 108 BPM
EOSINOPHIL # BLD: 0 K/UL (ref 0–0.4)
EOSINOPHIL NFR BLD: 0 % (ref 0–5)
ERYTHROCYTE [DISTWIDTH] IN BLOOD BY AUTOMATED COUNT: 19.1 % (ref 11.6–14.5)
GLOBULIN SER CALC-MCNC: 3 G/DL (ref 2–4)
GLUCOSE BLD STRIP.AUTO-MCNC: 110 MG/DL (ref 70–110)
GLUCOSE BLD STRIP.AUTO-MCNC: 118 MG/DL (ref 70–110)
GLUCOSE SERPL-MCNC: 145 MG/DL (ref 74–99)
HCT VFR BLD AUTO: 18.6 % (ref 35–45)
HCT VFR BLD AUTO: 18.8 % (ref 35–45)
HCT VFR BLD AUTO: 24.2 % (ref 35–45)
HCT VFR BLD AUTO: 28.9 % (ref 35–45)
HGB BLD-MCNC: 5.9 G/DL (ref 12–16)
HGB BLD-MCNC: 5.9 G/DL (ref 12–16)
HGB BLD-MCNC: 7.6 G/DL (ref 12–16)
HGB BLD-MCNC: 9.8 G/DL (ref 12–16)
HISTORY CHECK: NORMAL
HISTORY CHECK: NORMAL
IMM GRANULOCYTES # BLD AUTO: 0.5 K/UL (ref 0–0.04)
IMM GRANULOCYTES NFR BLD AUTO: 5 % (ref 0–0.5)
INR PPP: 1.7 (ref 0.9–1.1)
IRON SATN MFR SERPL: 58 % (ref 20–50)
IRON SERPL-MCNC: 114 UG/DL (ref 50–175)
LYMPHOCYTES # BLD: 1.6 K/UL (ref 0.9–3.6)
LYMPHOCYTES NFR BLD: 16 % (ref 21–52)
MCH RBC QN AUTO: 27.6 PG (ref 24–34)
MCHC RBC AUTO-ENTMCNC: 31.4 G/DL (ref 31–37)
MCV RBC AUTO: 88 FL (ref 78–100)
MONOCYTES # BLD: 1 K/UL (ref 0.05–1.2)
MONOCYTES NFR BLD: 10 % (ref 3–10)
NEUTS SEG # BLD: 6.9 K/UL (ref 1.8–8)
NEUTS SEG NFR BLD: 68 % (ref 40–73)
NRBC # BLD: 0.39 K/UL (ref 0–0.01)
NRBC BLD-RTO: 3.9 PER 100 WBC
PLATELET # BLD AUTO: 238 K/UL (ref 135–420)
PMV BLD AUTO: 9.2 FL (ref 9.2–11.8)
POTASSIUM SERPL-SCNC: 4 MMOL/L (ref 3.5–5.5)
PROT SERPL-MCNC: 5.4 G/DL (ref 6.4–8.2)
PROTHROMBIN TIME: 20.3 SEC (ref 11.9–14.7)
RBC # BLD AUTO: 2.75 M/UL (ref 4.2–5.3)
SODIUM SERPL-SCNC: 142 MMOL/L (ref 136–145)
TIBC SERPL-MCNC: 198 UG/DL (ref 250–450)
WBC # BLD AUTO: 10.1 K/UL (ref 4.6–13.2)

## 2023-12-24 PROCEDURE — C9113 INJ PANTOPRAZOLE SODIUM, VIA: HCPCS | Performed by: INTERNAL MEDICINE

## 2023-12-24 PROCEDURE — 83540 ASSAY OF IRON: CPT

## 2023-12-24 PROCEDURE — A4216 STERILE WATER/SALINE, 10 ML: HCPCS | Performed by: INTERNAL MEDICINE

## 2023-12-24 PROCEDURE — 80053 COMPREHEN METABOLIC PANEL: CPT

## 2023-12-24 PROCEDURE — 85730 THROMBOPLASTIN TIME PARTIAL: CPT

## 2023-12-24 PROCEDURE — 83550 IRON BINDING TEST: CPT

## 2023-12-24 PROCEDURE — 6360000002 HC RX W HCPCS: Performed by: INTERNAL MEDICINE

## 2023-12-24 PROCEDURE — 85018 HEMOGLOBIN: CPT

## 2023-12-24 PROCEDURE — 36430 TRANSFUSION BLD/BLD COMPNT: CPT

## 2023-12-24 PROCEDURE — 2580000003 HC RX 258: Performed by: INTERNAL MEDICINE

## 2023-12-24 PROCEDURE — P9016 RBC LEUKOCYTES REDUCED: HCPCS

## 2023-12-24 PROCEDURE — 85014 HEMATOCRIT: CPT

## 2023-12-24 PROCEDURE — 85610 PROTHROMBIN TIME: CPT

## 2023-12-24 PROCEDURE — 93010 ELECTROCARDIOGRAM REPORT: CPT | Performed by: INTERNAL MEDICINE

## 2023-12-24 PROCEDURE — 94761 N-INVAS EAR/PLS OXIMETRY MLT: CPT

## 2023-12-24 PROCEDURE — 1100000003 HC PRIVATE W/ TELEMETRY

## 2023-12-24 PROCEDURE — 36415 COLL VENOUS BLD VENIPUNCTURE: CPT

## 2023-12-24 PROCEDURE — 82962 GLUCOSE BLOOD TEST: CPT

## 2023-12-24 PROCEDURE — 85025 COMPLETE CBC W/AUTO DIFF WBC: CPT

## 2023-12-24 PROCEDURE — 6360000002 HC RX W HCPCS: Performed by: HOSPITALIST

## 2023-12-24 PROCEDURE — 99232 SBSQ HOSP IP/OBS MODERATE 35: CPT | Performed by: HOSPITALIST

## 2023-12-24 RX ORDER — INSULIN LISPRO 100 [IU]/ML
0-4 INJECTION, SOLUTION INTRAVENOUS; SUBCUTANEOUS NIGHTLY
Status: DISCONTINUED | OUTPATIENT
Start: 2023-12-24 | End: 2023-12-27 | Stop reason: HOSPADM

## 2023-12-24 RX ORDER — LANOLIN ALCOHOL/MO/W.PET/CERES
3 CREAM (GRAM) TOPICAL NIGHTLY PRN
Status: DISCONTINUED | OUTPATIENT
Start: 2023-12-24 | End: 2023-12-27 | Stop reason: HOSPADM

## 2023-12-24 RX ORDER — SODIUM CHLORIDE 9 MG/ML
INJECTION, SOLUTION INTRAVENOUS PRN
Status: DISCONTINUED | OUTPATIENT
Start: 2023-12-24 | End: 2023-12-27 | Stop reason: HOSPADM

## 2023-12-24 RX ORDER — INSULIN LISPRO 100 [IU]/ML
0-4 INJECTION, SOLUTION INTRAVENOUS; SUBCUTANEOUS
Status: DISCONTINUED | OUTPATIENT
Start: 2023-12-24 | End: 2023-12-27 | Stop reason: HOSPADM

## 2023-12-24 RX ORDER — FUROSEMIDE 10 MG/ML
40 INJECTION INTRAMUSCULAR; INTRAVENOUS ONCE
Status: COMPLETED | OUTPATIENT
Start: 2023-12-24 | End: 2023-12-24

## 2023-12-24 RX ADMIN — ARFORMOTEROL TARTRATE 15 MCG: 15 SOLUTION RESPIRATORY (INHALATION) at 09:37

## 2023-12-24 RX ADMIN — PANTOPRAZOLE SODIUM 40 MG: 40 INJECTION, POWDER, FOR SOLUTION INTRAVENOUS at 22:09

## 2023-12-24 RX ADMIN — SODIUM CHLORIDE, PRESERVATIVE FREE 10 ML: 5 INJECTION INTRAVENOUS at 22:18

## 2023-12-24 RX ADMIN — SODIUM CHLORIDE, PRESERVATIVE FREE 10 ML: 5 INJECTION INTRAVENOUS at 09:38

## 2023-12-24 RX ADMIN — BUDESONIDE 1 MG: 1 SUSPENSION RESPIRATORY (INHALATION) at 22:09

## 2023-12-24 RX ADMIN — FUROSEMIDE 40 MG: 10 INJECTION, SOLUTION INTRAMUSCULAR; INTRAVENOUS at 01:43

## 2023-12-24 RX ADMIN — ARFORMOTEROL TARTRATE 15 MCG: 15 SOLUTION RESPIRATORY (INHALATION) at 22:09

## 2023-12-24 RX ADMIN — BUDESONIDE 1 MG: 1 SUSPENSION RESPIRATORY (INHALATION) at 09:38

## 2023-12-24 RX ADMIN — PANTOPRAZOLE SODIUM 40 MG: 40 INJECTION, POWDER, FOR SOLUTION INTRAVENOUS at 09:34

## 2023-12-24 NOTE — ED NOTES
Hospitalist saw pt at bedside regarding admission. Blood transfusion started VSS. Pt removed from O2 via NC d/t sat 100%. Pt remains at 100% on room air. Pt provided mask to wear when staff is at bedside.

## 2023-12-24 NOTE — CONSULTS
[email or web address]  [phone]    GASTROENTEROLOGY CONSULT      Impression:   1. GI bleed likely upper not currently active  2. Acute on chronic anemia due to bleeding  3. Hx of CHF  4, hx of CRF  5. COPD on O2  6. Type 2 dm  7. Recent hx PE and DVT on eliquis   8. + COVID  9. Perforated diverticular disease s/p resection this year   10. CAD on low dose asa     Plan:     1. Bid PPI  2, Hold anti coagulation and asa. If high risk for embolism then she will need a filter placed. as an interim measure   3. Transfuse as needed   4. Would like to delay egd until medical issues stabilized but will urgently intervene for active arterial bleed     Case discussed with Hospitalist      Chief Complaint: melena and SOB      HPI:  Marry Toledo is a 72 y.o. female who I am being asked to see in consultation for an opinion regarding hx of recent melena with shortness of breath. Labs in ER showed profound anemia acute on chronic. She has a number of other issues as noted above and is anticoagulated. .Nasal swab pos for COVID.       PMH:   Past Medical History:   Diagnosis Date    Anemia     Chondromalacia of both patellae     Chronic heart failure with preserved ejection fraction ([address]) 01/13/2023    Chronic kidney disease     COPD (chronic obstructive pulmonary disease) ([address]) 09/2015    mild-mod dz; Dr Amilcar Cohen    Diabetes [facility]) 2013    Dilated cardiomyopathy ([address])     Dyslipidemia     Gout     Heart attack ([address]) 10/18/2019    Stented    Hypercholesteremia 01/08/2014    Hypertension 2000    Mild protein-calorie malnutrition ([address]) 8/7/2023    Obesity     Orthostatic hypotension 05/17/2016    Osteoarthritis of both knees     Popliteal cyst     Sleep apnea     not using cpap    Stage 3b chronic kidney disease ([address]) 08/08/2022    Vitamin D deficiency 10/16/2014       PSH:   Past Surgical History:   Procedure Laterality Date    CARDIAC CATHETERIZATION  2019    Stents x 2    CARDIAC PROCEDURE N/A 8/9/2023    Left

## 2023-12-24 NOTE — ED NOTES
Pt's  at bedside, Dr. Jesus Nicholas explained risks and advantages of BT.    Pt's  signed blood transfusion consent.

## 2023-12-24 NOTE — H&P
Inhale 2 L into the lungs continuous. Yes ProviderSocorro MD   gabapentin (NEURONTIN) 400 MG capsule Take 1 capsule by mouth every evening. Yes ProviderSocorro MD   insulin detemir (LEVEMIR) 100 UNIT/ML injection pen Inject 15 Units into the skin nightly   Yes ProviderSocorro MD   atorvastatin (LIPITOR) 40 MG tablet Take 1 tablet by mouth nightly 8/11/23  Yes Marie Melton MD   metoprolol succinate (TOPROL XL) 25 MG extended release tablet Take 1 tablet by mouth daily 8/12/23  Yes Marie Melton MD   albuterol sulfate HFA (PROVENTIL;VENTOLIN;PROAIR) 108 (90 Base) MCG/ACT inhaler Inhale 2 puffs into the lungs every 6 hours as needed 2/3/23  Yes Automatic Reconciliation, Ar   ipratropium-albuterol (DUONEB) 0.5-2.5 (3) MG/3ML SOLN nebulizer solution Inhale 3 mLs into the lungs every 6 hours as needed 3/23/21  Yes Automatic Reconciliation, Ar   Umeclidinium-Vilanterol 62.5-25 MCG/ACT AEPB Inhale 1 puff into the lungs daily 4/26/17  Yes Automatic Reconciliation, Ar   apixaban (ELIQUIS) 5 MG TABS tablet Take 2 tablets by mouth 2 times daily for 12 doses 12/16/23 12/22/23  Dionne Crigler, MD   nitroGLYCERIN (NITROSTAT) 0.4 MG SL tablet Place 1 tablet under the tongue every 5 minutes as needed for Chest pain up to max of 3 total doses. If no relief after 1 dose, call 911. 8/11/23   Marie Melton MD       No Known Allergies      Review of Systems  GENERAL: Patient alert, awake and oriented times 3, able to communicate full sentences and not in distress. no weight loss, no falls. HEENT: No change in vision, no earache, no tinnitus, no sore throat or sinus congestion. NECK: No pain or stiffness. PULMONARY: No shortness of breath, no cough or wheeze. Cardiovascular: no pnd or orthopnea, no CP  GASTROINTESTINAL: No abdominal pain, no nausea, no vomiting or diarrhea, no melena or bright red blood per rectum. GENITOURINARY: No urinary frequency, no urgency, no hesitancy or dysuria.

## 2023-12-24 NOTE — ED NOTES
PATIENT LEFT LYING IN BED, BREATHING EVIDENT, NO COMPLAINT VOICED. HAND OVER REPORT GIVEN TO NIGHT RN, CARE CONTINUES.   PATIENT POSSIBLE ADMISSION

## 2023-12-24 NOTE — ED NOTES
Reports given by Du Leung RN. Assumed care for this pt. Pt is alert and orientedx4, pt dneies chest pain/SOB at this time. Assisted to bedside commode at this time. Pt assisted back to the bed, hooked to cardiac monitor Safety precautions implemented. call bell within reach, bed at its lowest position

## 2023-12-25 LAB
ABO + RH BLD: NORMAL
BLD PROD TYP BPU: NORMAL
BLOOD BANK DISPENSE STATUS: NORMAL
BLOOD GROUP ANTIBODIES SERPL: NORMAL
BPU ID: NORMAL
CALLED TO: NORMAL
CALLED TO:,BCALL3: NORMAL
CALLED TO:: NORMAL
CROSSMATCH RESULT: NORMAL
GLUCOSE BLD STRIP.AUTO-MCNC: 106 MG/DL (ref 70–110)
GLUCOSE BLD STRIP.AUTO-MCNC: 113 MG/DL (ref 70–110)
GLUCOSE BLD STRIP.AUTO-MCNC: 114 MG/DL (ref 70–110)
GLUCOSE BLD STRIP.AUTO-MCNC: 119 MG/DL (ref 70–110)
HCT VFR BLD AUTO: 26.5 % (ref 35–45)
HCT VFR BLD AUTO: 27.9 % (ref 35–45)
HCT VFR BLD AUTO: 28.4 % (ref 35–45)
HGB BLD-MCNC: 8.9 G/DL (ref 12–16)
HGB BLD-MCNC: 9.3 G/DL (ref 12–16)
HGB BLD-MCNC: 9.5 G/DL (ref 12–16)
SPECIMEN EXP DATE BLD: NORMAL
UNIT DIVISION: 0

## 2023-12-25 PROCEDURE — 94761 N-INVAS EAR/PLS OXIMETRY MLT: CPT

## 2023-12-25 PROCEDURE — 85014 HEMATOCRIT: CPT

## 2023-12-25 PROCEDURE — 97166 OT EVAL MOD COMPLEX 45 MIN: CPT

## 2023-12-25 PROCEDURE — 99232 SBSQ HOSP IP/OBS MODERATE 35: CPT | Performed by: FAMILY MEDICINE

## 2023-12-25 PROCEDURE — 85018 HEMOGLOBIN: CPT

## 2023-12-25 PROCEDURE — 6360000002 HC RX W HCPCS: Performed by: INTERNAL MEDICINE

## 2023-12-25 PROCEDURE — 2580000003 HC RX 258: Performed by: INTERNAL MEDICINE

## 2023-12-25 PROCEDURE — 97535 SELF CARE MNGMENT TRAINING: CPT

## 2023-12-25 PROCEDURE — 82962 GLUCOSE BLOOD TEST: CPT

## 2023-12-25 PROCEDURE — 6370000000 HC RX 637 (ALT 250 FOR IP): Performed by: FAMILY MEDICINE

## 2023-12-25 PROCEDURE — 1100000003 HC PRIVATE W/ TELEMETRY

## 2023-12-25 PROCEDURE — C9113 INJ PANTOPRAZOLE SODIUM, VIA: HCPCS | Performed by: INTERNAL MEDICINE

## 2023-12-25 PROCEDURE — 36415 COLL VENOUS BLD VENIPUNCTURE: CPT

## 2023-12-25 PROCEDURE — 94640 AIRWAY INHALATION TREATMENT: CPT

## 2023-12-25 PROCEDURE — A4216 STERILE WATER/SALINE, 10 ML: HCPCS | Performed by: INTERNAL MEDICINE

## 2023-12-25 RX ORDER — HYDROCODONE BITARTRATE AND ACETAMINOPHEN 5; 325 MG/1; MG/1
1 TABLET ORAL EVERY 4 HOURS PRN
Status: DISCONTINUED | OUTPATIENT
Start: 2023-12-25 | End: 2023-12-27 | Stop reason: HOSPADM

## 2023-12-25 RX ADMIN — PANTOPRAZOLE SODIUM 40 MG: 40 INJECTION, POWDER, FOR SOLUTION INTRAVENOUS at 09:13

## 2023-12-25 RX ADMIN — ARFORMOTEROL TARTRATE 15 MCG: 15 SOLUTION RESPIRATORY (INHALATION) at 19:20

## 2023-12-25 RX ADMIN — SODIUM CHLORIDE, PRESERVATIVE FREE 10 ML: 5 INJECTION INTRAVENOUS at 00:24

## 2023-12-25 RX ADMIN — HYDROCODONE BITARTRATE AND ACETAMINOPHEN 1 TABLET: 5; 325 TABLET ORAL at 20:49

## 2023-12-25 RX ADMIN — HYDROCODONE BITARTRATE AND ACETAMINOPHEN 1 TABLET: 5; 325 TABLET ORAL at 12:00

## 2023-12-25 RX ADMIN — SODIUM CHLORIDE, PRESERVATIVE FREE 10 ML: 5 INJECTION INTRAVENOUS at 20:53

## 2023-12-25 RX ADMIN — SODIUM CHLORIDE, PRESERVATIVE FREE 10 ML: 5 INJECTION INTRAVENOUS at 09:15

## 2023-12-25 RX ADMIN — ARFORMOTEROL TARTRATE 15 MCG: 15 SOLUTION RESPIRATORY (INHALATION) at 09:13

## 2023-12-25 RX ADMIN — PANTOPRAZOLE SODIUM 40 MG: 40 INJECTION, POWDER, FOR SOLUTION INTRAVENOUS at 20:49

## 2023-12-25 RX ADMIN — BUDESONIDE 1 MG: 1 SUSPENSION RESPIRATORY (INHALATION) at 09:13

## 2023-12-25 RX ADMIN — HYDROCODONE BITARTRATE AND ACETAMINOPHEN 1 TABLET: 5; 325 TABLET ORAL at 16:13

## 2023-12-25 RX ADMIN — BUDESONIDE 1 MG: 1 SUSPENSION RESPIRATORY (INHALATION) at 19:20

## 2023-12-25 NOTE — PLAN OF CARE
Problem: Discharge Planning  Goal: Discharge to home or other facility with appropriate resources  Outcome: Progressing  Flowsheets (Taken 12/24/2023 1500)  Discharge to home or other facility with appropriate resources:   Identify barriers to discharge with patient and caregiver   Identify discharge learning needs (meds, wound care, etc)   Arrange for needed discharge resources and transportation as appropriate     Problem: Chronic Conditions and Co-morbidities  Goal: Patient's chronic conditions and co-morbidity symptoms are monitored and maintained or improved  Outcome: Progressing  Flowsheets (Taken 12/24/2023 1500)  Care Plan - Patient's Chronic Conditions and Co-Morbidity Symptoms are Monitored and Maintained or Improved: Monitor and assess patient's chronic conditions and comorbid symptoms for stability, deterioration, or improvement

## 2023-12-26 LAB
ANION GAP SERPL CALC-SCNC: 5 MMOL/L (ref 3–18)
ANION GAP SERPL CALC-SCNC: 5 MMOL/L (ref 3–18)
BASOPHILS # BLD: 0 K/UL (ref 0–0.1)
BASOPHILS NFR BLD: 0 % (ref 0–2)
BUN SERPL-MCNC: 49 MG/DL (ref 7–18)
BUN SERPL-MCNC: 50 MG/DL (ref 7–18)
BUN/CREAT SERPL: 25 (ref 12–20)
BUN/CREAT SERPL: 27 (ref 12–20)
CALCIUM SERPL-MCNC: 8.4 MG/DL (ref 8.5–10.1)
CALCIUM SERPL-MCNC: 8.7 MG/DL (ref 8.5–10.1)
CHLORIDE SERPL-SCNC: 116 MMOL/L (ref 100–111)
CHLORIDE SERPL-SCNC: 116 MMOL/L (ref 100–111)
CO2 SERPL-SCNC: 18 MMOL/L (ref 21–32)
CO2 SERPL-SCNC: 19 MMOL/L (ref 21–32)
CREAT SERPL-MCNC: 1.86 MG/DL (ref 0.6–1.3)
CREAT SERPL-MCNC: 2 MG/DL (ref 0.6–1.3)
DIFFERENTIAL METHOD BLD: ABNORMAL
EOSINOPHIL # BLD: 0.1 K/UL (ref 0–0.4)
EOSINOPHIL NFR BLD: 1 % (ref 0–5)
ERYTHROCYTE [DISTWIDTH] IN BLOOD BY AUTOMATED COUNT: 18.6 % (ref 11.6–14.5)
GLUCOSE BLD STRIP.AUTO-MCNC: 115 MG/DL (ref 70–110)
GLUCOSE BLD STRIP.AUTO-MCNC: 119 MG/DL (ref 70–110)
GLUCOSE BLD STRIP.AUTO-MCNC: 159 MG/DL (ref 70–110)
GLUCOSE BLD STRIP.AUTO-MCNC: 165 MG/DL (ref 70–110)
GLUCOSE SERPL-MCNC: 105 MG/DL (ref 74–99)
GLUCOSE SERPL-MCNC: 95 MG/DL (ref 74–99)
HCT VFR BLD AUTO: 28.4 % (ref 35–45)
HCT VFR BLD AUTO: 28.9 % (ref 35–45)
HGB BLD-MCNC: 8.8 G/DL (ref 12–16)
HGB BLD-MCNC: 9.1 G/DL (ref 12–16)
IMM GRANULOCYTES # BLD AUTO: 0.1 K/UL (ref 0–0.04)
IMM GRANULOCYTES NFR BLD AUTO: 2 % (ref 0–0.5)
LYMPHOCYTES # BLD: 0.8 K/UL (ref 0.9–3.6)
LYMPHOCYTES NFR BLD: 15 % (ref 21–52)
MCH RBC QN AUTO: 29 PG (ref 24–34)
MCHC RBC AUTO-ENTMCNC: 31 G/DL (ref 31–37)
MCV RBC AUTO: 93.7 FL (ref 78–100)
MONOCYTES # BLD: 0.5 K/UL (ref 0.05–1.2)
MONOCYTES NFR BLD: 9 % (ref 3–10)
NEUTS SEG # BLD: 3.8 K/UL (ref 1.8–8)
NEUTS SEG NFR BLD: 73 % (ref 40–73)
NRBC # BLD: 0.06 K/UL (ref 0–0.01)
NRBC BLD-RTO: 1.1 PER 100 WBC
PLATELET # BLD AUTO: 104 K/UL (ref 135–420)
PLATELET COMMENT: ABNORMAL
PMV BLD AUTO: 11.1 FL (ref 9.2–11.8)
POTASSIUM SERPL-SCNC: 4.5 MMOL/L (ref 3.5–5.5)
POTASSIUM SERPL-SCNC: 4.7 MMOL/L (ref 3.5–5.5)
RBC # BLD AUTO: 3.03 M/UL (ref 4.2–5.3)
RBC MORPH BLD: ABNORMAL
SODIUM SERPL-SCNC: 139 MMOL/L (ref 136–145)
SODIUM SERPL-SCNC: 140 MMOL/L (ref 136–145)
WBC # BLD AUTO: 5.3 K/UL (ref 4.6–13.2)

## 2023-12-26 PROCEDURE — 1100000003 HC PRIVATE W/ TELEMETRY

## 2023-12-26 PROCEDURE — 6370000000 HC RX 637 (ALT 250 FOR IP): Performed by: FAMILY MEDICINE

## 2023-12-26 PROCEDURE — 80048 BASIC METABOLIC PNL TOTAL CA: CPT

## 2023-12-26 PROCEDURE — 2580000003 HC RX 258: Performed by: INTERNAL MEDICINE

## 2023-12-26 PROCEDURE — A4216 STERILE WATER/SALINE, 10 ML: HCPCS | Performed by: INTERNAL MEDICINE

## 2023-12-26 PROCEDURE — 36415 COLL VENOUS BLD VENIPUNCTURE: CPT

## 2023-12-26 PROCEDURE — 85018 HEMOGLOBIN: CPT

## 2023-12-26 PROCEDURE — 82962 GLUCOSE BLOOD TEST: CPT

## 2023-12-26 PROCEDURE — 6370000000 HC RX 637 (ALT 250 FOR IP): Performed by: INTERNAL MEDICINE

## 2023-12-26 PROCEDURE — 99232 SBSQ HOSP IP/OBS MODERATE 35: CPT | Performed by: FAMILY MEDICINE

## 2023-12-26 PROCEDURE — 85025 COMPLETE CBC W/AUTO DIFF WBC: CPT

## 2023-12-26 PROCEDURE — 94640 AIRWAY INHALATION TREATMENT: CPT

## 2023-12-26 PROCEDURE — 6360000002 HC RX W HCPCS: Performed by: INTERNAL MEDICINE

## 2023-12-26 PROCEDURE — C9113 INJ PANTOPRAZOLE SODIUM, VIA: HCPCS | Performed by: INTERNAL MEDICINE

## 2023-12-26 PROCEDURE — 85014 HEMATOCRIT: CPT

## 2023-12-26 PROCEDURE — 94761 N-INVAS EAR/PLS OXIMETRY MLT: CPT

## 2023-12-26 RX ORDER — BUDESONIDE AND FORMOTEROL FUMARATE DIHYDRATE 160; 4.5 UG/1; UG/1
2 AEROSOL RESPIRATORY (INHALATION)
Status: DISCONTINUED | OUTPATIENT
Start: 2023-12-26 | End: 2023-12-27 | Stop reason: HOSPADM

## 2023-12-26 RX ORDER — ALPRAZOLAM 0.5 MG/1
0.5 TABLET ORAL 4 TIMES DAILY PRN
Status: DISCONTINUED | OUTPATIENT
Start: 2023-12-26 | End: 2023-12-27 | Stop reason: HOSPADM

## 2023-12-26 RX ADMIN — HYDROCODONE BITARTRATE AND ACETAMINOPHEN 1 TABLET: 5; 325 TABLET ORAL at 12:20

## 2023-12-26 RX ADMIN — SODIUM CHLORIDE, PRESERVATIVE FREE 10 ML: 5 INJECTION INTRAVENOUS at 21:02

## 2023-12-26 RX ADMIN — HYDROCODONE BITARTRATE AND ACETAMINOPHEN 1 TABLET: 5; 325 TABLET ORAL at 17:34

## 2023-12-26 RX ADMIN — BUDESONIDE AND FORMOTEROL FUMARATE DIHYDRATE 2 PUFF: 160; 4.5 AEROSOL RESPIRATORY (INHALATION) at 11:26

## 2023-12-26 RX ADMIN — BUDESONIDE AND FORMOTEROL FUMARATE DIHYDRATE 2 PUFF: 160; 4.5 AEROSOL RESPIRATORY (INHALATION) at 20:03

## 2023-12-26 RX ADMIN — ALPRAZOLAM 0.5 MG: 0.5 TABLET ORAL at 18:43

## 2023-12-26 RX ADMIN — Medication 3 MG: at 21:24

## 2023-12-26 RX ADMIN — IPRATROPIUM BROMIDE AND ALBUTEROL 2 PUFF: 20; 100 SPRAY, METERED RESPIRATORY (INHALATION) at 11:26

## 2023-12-26 RX ADMIN — HYDROCODONE BITARTRATE AND ACETAMINOPHEN 1 TABLET: 5; 325 TABLET ORAL at 04:29

## 2023-12-26 RX ADMIN — HYDROCODONE BITARTRATE AND ACETAMINOPHEN 1 TABLET: 5; 325 TABLET ORAL at 08:44

## 2023-12-26 RX ADMIN — HYDROCODONE BITARTRATE AND ACETAMINOPHEN 1 TABLET: 5; 325 TABLET ORAL at 21:16

## 2023-12-26 RX ADMIN — PANTOPRAZOLE SODIUM 40 MG: 40 INJECTION, POWDER, FOR SOLUTION INTRAVENOUS at 08:44

## 2023-12-26 RX ADMIN — PANTOPRAZOLE SODIUM 40 MG: 40 INJECTION, POWDER, FOR SOLUTION INTRAVENOUS at 21:02

## 2023-12-26 RX ADMIN — SODIUM CHLORIDE, PRESERVATIVE FREE 10 ML: 5 INJECTION INTRAVENOUS at 08:45

## 2023-12-27 VITALS
OXYGEN SATURATION: 97 % | RESPIRATION RATE: 18 BRPM | HEART RATE: 98 BPM | TEMPERATURE: 98.7 F | BODY MASS INDEX: 32.78 KG/M2 | WEIGHT: 192 LBS | DIASTOLIC BLOOD PRESSURE: 70 MMHG | SYSTOLIC BLOOD PRESSURE: 113 MMHG | HEIGHT: 64 IN

## 2023-12-27 LAB
GLUCOSE BLD STRIP.AUTO-MCNC: 123 MG/DL (ref 70–110)
GLUCOSE BLD STRIP.AUTO-MCNC: 138 MG/DL (ref 70–110)

## 2023-12-27 PROCEDURE — 6370000000 HC RX 637 (ALT 250 FOR IP): Performed by: FAMILY MEDICINE

## 2023-12-27 PROCEDURE — 2580000003 HC RX 258: Performed by: INTERNAL MEDICINE

## 2023-12-27 PROCEDURE — C9113 INJ PANTOPRAZOLE SODIUM, VIA: HCPCS | Performed by: INTERNAL MEDICINE

## 2023-12-27 PROCEDURE — 6360000002 HC RX W HCPCS: Performed by: INTERNAL MEDICINE

## 2023-12-27 PROCEDURE — 94761 N-INVAS EAR/PLS OXIMETRY MLT: CPT

## 2023-12-27 PROCEDURE — A4216 STERILE WATER/SALINE, 10 ML: HCPCS | Performed by: INTERNAL MEDICINE

## 2023-12-27 PROCEDURE — 94640 AIRWAY INHALATION TREATMENT: CPT

## 2023-12-27 PROCEDURE — 82962 GLUCOSE BLOOD TEST: CPT

## 2023-12-27 RX ORDER — HYDROCODONE BITARTRATE AND ACETAMINOPHEN 5; 325 MG/1; MG/1
1 TABLET ORAL EVERY 4 HOURS PRN
Qty: 12 TABLET | Refills: 0 | Status: SHIPPED | OUTPATIENT
Start: 2023-12-27 | End: 2023-12-30

## 2023-12-27 RX ADMIN — HYDROCODONE BITARTRATE AND ACETAMINOPHEN 1 TABLET: 5; 325 TABLET ORAL at 02:09

## 2023-12-27 RX ADMIN — PANTOPRAZOLE SODIUM 40 MG: 40 INJECTION, POWDER, FOR SOLUTION INTRAVENOUS at 09:36

## 2023-12-27 RX ADMIN — ALPRAZOLAM 0.5 MG: 0.5 TABLET ORAL at 10:01

## 2023-12-27 RX ADMIN — BUDESONIDE AND FORMOTEROL FUMARATE DIHYDRATE 2 PUFF: 160; 4.5 AEROSOL RESPIRATORY (INHALATION) at 09:06

## 2023-12-27 RX ADMIN — SODIUM CHLORIDE, PRESERVATIVE FREE 10 ML: 5 INJECTION INTRAVENOUS at 09:38

## 2023-12-27 RX ADMIN — HYDROCODONE BITARTRATE AND ACETAMINOPHEN 1 TABLET: 5; 325 TABLET ORAL at 10:01

## 2023-12-27 NOTE — PLAN OF CARE
Problem: Discharge Planning  Goal: Discharge to home or other facility with appropriate resources  Outcome: Progressing  Flowsheets (Taken 12/27/2023 0727)  Discharge to home or other facility with appropriate resources: Identify barriers to discharge with patient and caregiver     Problem: Chronic Conditions and Co-morbidities  Goal: Patient's chronic conditions and co-morbidity symptoms are monitored and maintained or improved  Outcome: Progressing  Flowsheets (Taken 12/27/2023 0727)  Care Plan - Patient's Chronic Conditions and Co-Morbidity Symptoms are Monitored and Maintained or Improved: Monitor and assess patient's chronic conditions and comorbid symptoms for stability, deterioration, or improvement     Problem: Safety - Adult  Goal: Free from fall injury  Outcome: Progressing     Problem: Pain  Goal: Verbalizes/displays adequate comfort level or baseline comfort level  Outcome: Progressing

## 2023-12-30 ENCOUNTER — APPOINTMENT (OUTPATIENT)
Facility: HOSPITAL | Age: 73
End: 2023-12-30
Payer: MEDICARE

## 2023-12-30 ENCOUNTER — HOSPITAL ENCOUNTER (EMERGENCY)
Facility: HOSPITAL | Age: 73
Discharge: HOME | End: 2023-12-30
Payer: MEDICARE

## 2023-12-30 VITALS
WEIGHT: 192 LBS | TEMPERATURE: 98.6 F | DIASTOLIC BLOOD PRESSURE: 66 MMHG | HEART RATE: 92 BPM | RESPIRATION RATE: 19 BRPM | SYSTOLIC BLOOD PRESSURE: 129 MMHG | BODY MASS INDEX: 32.78 KG/M2 | HEIGHT: 64 IN | OXYGEN SATURATION: 98 %

## 2023-12-30 DIAGNOSIS — N18.32 STAGE 3B CHRONIC KIDNEY DISEASE (HCC): ICD-10-CM

## 2023-12-30 DIAGNOSIS — J44.9 CHRONIC OBSTRUCTIVE PULMONARY DISEASE, UNSPECIFIED COPD TYPE (HCC): ICD-10-CM

## 2023-12-30 DIAGNOSIS — K92.1 MELENA: ICD-10-CM

## 2023-12-30 DIAGNOSIS — I50.9 ACUTE ON CHRONIC CONGESTIVE HEART FAILURE, UNSPECIFIED HEART FAILURE TYPE (HCC): Primary | ICD-10-CM

## 2023-12-30 LAB
ABO + RH BLD: NORMAL
ALBUMIN SERPL-MCNC: 2.7 G/DL (ref 3.4–5)
ALBUMIN/GLOB SERPL: 0.8 (ref 0.8–1.7)
ALP SERPL-CCNC: 103 U/L (ref 45–117)
ALT SERPL-CCNC: 16 U/L (ref 13–56)
ANION GAP SERPL CALC-SCNC: 5 MMOL/L (ref 3–18)
AST SERPL-CCNC: 16 U/L (ref 10–38)
BASOPHILS # BLD: 0 K/UL (ref 0–0.1)
BASOPHILS NFR BLD: 0 % (ref 0–2)
BILIRUB SERPL-MCNC: 0.4 MG/DL (ref 0.2–1)
BLOOD GROUP ANTIBODIES SERPL: NORMAL
BUN SERPL-MCNC: 14 MG/DL (ref 7–18)
BUN/CREAT SERPL: 8 (ref 12–20)
CALCIUM SERPL-MCNC: 8.8 MG/DL (ref 8.5–10.1)
CHLORIDE SERPL-SCNC: 118 MMOL/L (ref 100–111)
CO2 SERPL-SCNC: 20 MMOL/L (ref 21–32)
CREAT SERPL-MCNC: 1.74 MG/DL (ref 0.6–1.3)
D DIMER PPP FEU-MCNC: 1.63 UG/ML(FEU)
DIFFERENTIAL METHOD BLD: ABNORMAL
EOSINOPHIL # BLD: 0.1 K/UL (ref 0–0.4)
EOSINOPHIL NFR BLD: 2 % (ref 0–5)
ERYTHROCYTE [DISTWIDTH] IN BLOOD BY AUTOMATED COUNT: 18.6 % (ref 11.6–14.5)
GLOBULIN SER CALC-MCNC: 3.4 G/DL (ref 2–4)
GLUCOSE SERPL-MCNC: 120 MG/DL (ref 74–99)
HCT VFR BLD AUTO: 28.7 % (ref 35–45)
HGB BLD-MCNC: 9.1 G/DL (ref 12–16)
IMM GRANULOCYTES # BLD AUTO: 0.2 K/UL (ref 0–0.04)
IMM GRANULOCYTES NFR BLD AUTO: 3 % (ref 0–0.5)
INR PPP: 1.1 (ref 0.9–1.1)
LYMPHOCYTES # BLD: 1.2 K/UL (ref 0.9–3.6)
LYMPHOCYTES NFR BLD: 20 % (ref 21–52)
MAGNESIUM SERPL-MCNC: 1.6 MG/DL (ref 1.6–2.6)
MCH RBC QN AUTO: 29.1 PG (ref 24–34)
MCHC RBC AUTO-ENTMCNC: 31.7 G/DL (ref 31–37)
MCV RBC AUTO: 91.7 FL (ref 78–100)
MONOCYTES # BLD: 0.5 K/UL (ref 0.05–1.2)
MONOCYTES NFR BLD: 8 % (ref 3–10)
NEUTS SEG # BLD: 4 K/UL (ref 1.8–8)
NEUTS SEG NFR BLD: 67 % (ref 40–73)
NRBC # BLD: 0.08 K/UL (ref 0–0.01)
NRBC BLD-RTO: 1.4 PER 100 WBC
NT PRO BNP: 3158 PG/ML (ref 0–900)
PHOSPHATE SERPL-MCNC: 2.6 MG/DL (ref 2.5–4.9)
PLATELET # BLD AUTO: 360 K/UL (ref 135–420)
PMV BLD AUTO: 9.1 FL (ref 9.2–11.8)
POTASSIUM SERPL-SCNC: 4.1 MMOL/L (ref 3.5–5.5)
PROT SERPL-MCNC: 6.1 G/DL (ref 6.4–8.2)
PROTHROMBIN TIME: 14.4 SEC (ref 11.9–14.7)
RBC # BLD AUTO: 3.13 M/UL (ref 4.2–5.3)
SODIUM SERPL-SCNC: 143 MMOL/L (ref 136–145)
SPECIMEN EXP DATE BLD: NORMAL
TROPONIN I SERPL HS-MCNC: 43 NG/L (ref 0–54)
TROPONIN I SERPL HS-MCNC: 48 NG/L (ref 0–54)
WBC # BLD AUTO: 5.9 K/UL (ref 4.6–13.2)

## 2023-12-30 PROCEDURE — 94761 N-INVAS EAR/PLS OXIMETRY MLT: CPT

## 2023-12-30 PROCEDURE — 85379 FIBRIN DEGRADATION QUANT: CPT

## 2023-12-30 PROCEDURE — 2500000003 HC RX 250 WO HCPCS: Performed by: HEALTH CARE PROVIDER

## 2023-12-30 PROCEDURE — 93005 ELECTROCARDIOGRAM TRACING: CPT | Performed by: EMERGENCY MEDICINE

## 2023-12-30 PROCEDURE — 96374 THER/PROPH/DIAG INJ IV PUSH: CPT

## 2023-12-30 PROCEDURE — 85025 COMPLETE CBC W/AUTO DIFF WBC: CPT

## 2023-12-30 PROCEDURE — 83880 ASSAY OF NATRIURETIC PEPTIDE: CPT

## 2023-12-30 PROCEDURE — 99285 EMERGENCY DEPT VISIT HI MDM: CPT

## 2023-12-30 PROCEDURE — 86850 RBC ANTIBODY SCREEN: CPT

## 2023-12-30 PROCEDURE — 80053 COMPREHEN METABOLIC PANEL: CPT

## 2023-12-30 PROCEDURE — 86901 BLOOD TYPING SEROLOGIC RH(D): CPT

## 2023-12-30 PROCEDURE — 85610 PROTHROMBIN TIME: CPT

## 2023-12-30 PROCEDURE — 83735 ASSAY OF MAGNESIUM: CPT

## 2023-12-30 PROCEDURE — 86900 BLOOD TYPING SEROLOGIC ABO: CPT

## 2023-12-30 PROCEDURE — 2580000003 HC RX 258: Performed by: HEALTH CARE PROVIDER

## 2023-12-30 PROCEDURE — 84484 ASSAY OF TROPONIN QUANT: CPT

## 2023-12-30 PROCEDURE — 96375 TX/PRO/DX INJ NEW DRUG ADDON: CPT

## 2023-12-30 PROCEDURE — A4216 STERILE WATER/SALINE, 10 ML: HCPCS | Performed by: HEALTH CARE PROVIDER

## 2023-12-30 PROCEDURE — 84100 ASSAY OF PHOSPHORUS: CPT

## 2023-12-30 PROCEDURE — 71045 X-RAY EXAM CHEST 1 VIEW: CPT

## 2023-12-30 PROCEDURE — 2700000000 HC OXYGEN THERAPY PER DAY

## 2023-12-30 PROCEDURE — 6360000002 HC RX W HCPCS: Performed by: HEALTH CARE PROVIDER

## 2023-12-30 PROCEDURE — C9113 INJ PANTOPRAZOLE SODIUM, VIA: HCPCS | Performed by: HEALTH CARE PROVIDER

## 2023-12-30 RX ORDER — BUMETANIDE 0.25 MG/ML
1 INJECTION INTRAMUSCULAR; INTRAVENOUS ONCE
Status: COMPLETED | OUTPATIENT
Start: 2023-12-30 | End: 2023-12-30

## 2023-12-30 RX ADMIN — BUMETANIDE 1 MG: 0.25 INJECTION INTRAMUSCULAR; INTRAVENOUS at 19:05

## 2023-12-30 RX ADMIN — PANTOPRAZOLE SODIUM 40 MG: 40 INJECTION, POWDER, FOR SOLUTION INTRAVENOUS at 17:15

## 2023-12-30 ASSESSMENT — PAIN SCALES - GENERAL: PAINLEVEL_OUTOF10: 10

## 2023-12-30 ASSESSMENT — PAIN - FUNCTIONAL ASSESSMENT: PAIN_FUNCTIONAL_ASSESSMENT: 0-10

## 2023-12-30 ASSESSMENT — PAIN DESCRIPTION - LOCATION: LOCATION: BACK;ABDOMEN

## 2023-12-30 NOTE — ED PROVIDER NOTES
EMERGENCY DEPARTMENT HISTORY AND PHYSICAL EXAM        Date: 12/30/2023  Patient Name: Sharon Rooney    History of Presenting Illness     Chief Complaint   Patient presents with    Shortness of Breath       History Provided By: History obtained from patient    HPI: Sharon Rooney, 72 y.o. female presents to the ED with cc of shortness of breath    Patient was recently discharged from the hospital 12/27 with COVID as well as melena. Melena was evaluated by GI and she was found to have stable hemoglobin at time of discharge. No endoscopy was done this is to be done outpatient. SOB is mildly worsening since discharge. She tracks weight at home and denies any weight gain. She is compliant with medications. Blood thinners: currently held due to GI bleed    No nausea, vomiting, diarrhea, fever, chills, chest pain,  leg swelling    There are no other complaints, changes, or physical findings at this time. Records Reviewed: Hospitalization 12/23/2023 to 12/27/2023, most recent hospitalist note prior to discharge:  No plans for endoscopy given stability of blood counts. Discussed w/GI today. Continued assistance appreciated. Supportive/symptomatic treatment as needed for myalgias and COVID related symptoms. Repeat labs - if Hgb remains stable, can discharge home in AM.  Plan d/w patient at length at bedside and she is agreeable. All questions answered. GI note by Samantha Lea: 12/26: GI bleed likely upper and not currently bleeding, hemoglobin at 9.1  Plan:  1. Continue PPI BID  2. Monitor H&H and transfuse as per protocol  3. Medical management as per primary team  4. Ok to restart anti coagulation later this week and low dose ASA next week. Continue BID PPI for 2 months then QD thereafter life long   5.  Will consider possible endoscopy later in week unless active arterial bleeding    Echo 12/13    Left Ventricle: Mildly reduced left ventricular systolic function with a visually estimated EF of 40 - soft.      Tenderness: There is no guarding or rebound.   Musculoskeletal:         General: Normal range of motion.      Cervical back: Normal range of motion and neck supple.   Skin:     General: Skin is warm.      Findings: No rash.   Neurological:      General: No focal deficit present.      Mental Status: She is alert and oriented to person, place, and time.      Cranial Nerves: No cranial nerve deficit.      Sensory: No sensory deficit.      Motor: No weakness.        Vitals:    12/30/23 1915   BP: 129/66   Pulse: 92   Resp: 19   Temp:    SpO2: 98%       Diagnostic Study Results     Labs -   No results found for this or any previous visit (from the past 12 hour(s)).      Radiologic Studies -   Non-plain film images such as CT, Ultrasound and MRI are read by the radiologist. Plain radiographic images are visualized and preliminarily interpreted by me with findings noted in MDM section. In evenings and at night radiology interpretations are not routinely available.     Interpretation per the Radiologist below, if available at the time of this note:    XR CHEST PORTABLE   Final Result      No acute finding          EKG interpretation: (Preliminary)  All EKG's are interpreted by the Emergency Department Physician who either signs or Co-signs this chart in the absence of a cardiologist.  Sinus tachycardia at 102 bpm, no ST changes or T wave inversions, normal axis.    SCREENINGS             Well's PE criteria:  - Clinical signs of DVT? (+3) 0   - PE #1 likely diagnosis? (+3) 0  - Heart rate > 100? (+1.5) 0  - Surgery in last 4 weeks or immobilization at least 3 days? (+1.5) 0  - Previous DVT or PE? (+1.5) 1.5  - Hemoptysis? (+1) 0  - Malignancy w/ treatment w/in 6 months or on palliative care? (+1) 0  Total score: 1.5    Three Tier Model    Patient is determined to be low risk (<2 points:1.3% incidence PE): consider d-dimer testing to rule out Pulmonary embolism. Alternatively consider a rule-out criteria such as

## 2023-12-31 LAB
EKG ATRIAL RATE: 102 BPM
EKG DIAGNOSIS: NORMAL
EKG P AXIS: 84 DEGREES
EKG P-R INTERVAL: 168 MS
EKG Q-T INTERVAL: 348 MS
EKG QRS DURATION: 78 MS
EKG QTC CALCULATION (BAZETT): 453 MS
EKG R AXIS: 35 DEGREES
EKG T AXIS: 90 DEGREES
EKG VENTRICULAR RATE: 102 BPM

## 2023-12-31 PROCEDURE — 93010 ELECTROCARDIOGRAM REPORT: CPT | Performed by: INTERNAL MEDICINE

## 2023-12-31 NOTE — DISCHARGE INSTRUCTIONS
Keep track of your weight when experiencing CHF exacerbation    Continue use of prescribed inhalers for COPD.    Return to emergency department if any worsening symptoms

## 2024-01-02 ENCOUNTER — HOSPITAL ENCOUNTER (EMERGENCY)
Facility: HOSPITAL | Age: 74
Discharge: HOME | End: 2024-01-02
Attending: EMERGENCY MEDICINE
Payer: MEDICARE

## 2024-01-02 ENCOUNTER — HOSPITAL ENCOUNTER (EMERGENCY)
Facility: HOSPITAL | Age: 74
Discharge: HOME | End: 2024-01-05
Payer: MEDICARE

## 2024-01-02 ENCOUNTER — APPOINTMENT (OUTPATIENT)
Facility: HOSPITAL | Age: 74
End: 2024-01-02
Payer: MEDICARE

## 2024-01-02 VITALS
TEMPERATURE: 97.4 F | DIASTOLIC BLOOD PRESSURE: 57 MMHG | OXYGEN SATURATION: 97 % | SYSTOLIC BLOOD PRESSURE: 146 MMHG | RESPIRATION RATE: 20 BRPM | HEART RATE: 90 BPM

## 2024-01-02 DIAGNOSIS — R10.13 ABDOMINAL PAIN, EPIGASTRIC: Primary | ICD-10-CM

## 2024-01-02 LAB
ALBUMIN SERPL-MCNC: 2.7 G/DL (ref 3.4–5)
ALBUMIN/GLOB SERPL: 0.9 (ref 0.8–1.7)
ALP SERPL-CCNC: 131 U/L (ref 45–117)
ALT SERPL-CCNC: 16 U/L (ref 13–56)
ANION GAP SERPL CALC-SCNC: 5 MMOL/L (ref 3–18)
AST SERPL-CCNC: 39 U/L (ref 10–38)
BASOPHILS # BLD: 0 K/UL (ref 0–0.1)
BASOPHILS NFR BLD: 0 % (ref 0–2)
BILIRUB SERPL-MCNC: 0.8 MG/DL (ref 0.2–1)
BUN SERPL-MCNC: 14 MG/DL (ref 7–18)
BUN/CREAT SERPL: 8 (ref 12–20)
CALCIUM SERPL-MCNC: 9.2 MG/DL (ref 8.5–10.1)
CHLORIDE SERPL-SCNC: 119 MMOL/L (ref 100–111)
CO2 SERPL-SCNC: 20 MMOL/L (ref 21–32)
CREAT SERPL-MCNC: 1.69 MG/DL (ref 0.6–1.3)
DIFFERENTIAL METHOD BLD: ABNORMAL
EOSINOPHIL # BLD: 0.1 K/UL (ref 0–0.4)
EOSINOPHIL NFR BLD: 1 % (ref 0–5)
ERYTHROCYTE [DISTWIDTH] IN BLOOD BY AUTOMATED COUNT: 18.2 % (ref 11.6–14.5)
GLOBULIN SER CALC-MCNC: 3.1 G/DL (ref 2–4)
GLUCOSE SERPL-MCNC: 100 MG/DL (ref 74–99)
HCT VFR BLD AUTO: 31.6 % (ref 35–45)
HGB BLD-MCNC: 10.1 G/DL (ref 12–16)
IMM GRANULOCYTES # BLD AUTO: 0.1 K/UL (ref 0–0.04)
IMM GRANULOCYTES NFR BLD AUTO: 2 % (ref 0–0.5)
LACTATE SERPL-SCNC: 0.9 MMOL/L (ref 0.4–2)
LIPASE SERPL-CCNC: 33 U/L (ref 13–75)
LYMPHOCYTES # BLD: 1.2 K/UL (ref 0.9–3.6)
LYMPHOCYTES NFR BLD: 17 % (ref 21–52)
MCH RBC QN AUTO: 28.7 PG (ref 24–34)
MCHC RBC AUTO-ENTMCNC: 32 G/DL (ref 31–37)
MCV RBC AUTO: 89.8 FL (ref 78–100)
MONOCYTES # BLD: 0.6 K/UL (ref 0.05–1.2)
MONOCYTES NFR BLD: 9 % (ref 3–10)
NEUTS SEG # BLD: 5.2 K/UL (ref 1.8–8)
NEUTS SEG NFR BLD: 71 % (ref 40–73)
NRBC # BLD: 0.03 K/UL (ref 0–0.01)
NRBC BLD-RTO: 0.4 PER 100 WBC
PLATELET # BLD AUTO: 423 K/UL (ref 135–420)
PMV BLD AUTO: 9.7 FL (ref 9.2–11.8)
POTASSIUM SERPL-SCNC: 5.4 MMOL/L (ref 3.5–5.5)
PROT SERPL-MCNC: 5.8 G/DL (ref 6.4–8.2)
RBC # BLD AUTO: 3.52 M/UL (ref 4.2–5.3)
SODIUM SERPL-SCNC: 144 MMOL/L (ref 136–145)
TROPONIN I SERPL HS-MCNC: 33 NG/L (ref 0–54)
WBC # BLD AUTO: 7.2 K/UL (ref 4.6–13.2)

## 2024-01-02 PROCEDURE — A4216 STERILE WATER/SALINE, 10 ML: HCPCS

## 2024-01-02 PROCEDURE — 85025 COMPLETE CBC W/AUTO DIFF WBC: CPT

## 2024-01-02 PROCEDURE — 2580000003 HC RX 258

## 2024-01-02 PROCEDURE — 83690 ASSAY OF LIPASE: CPT

## 2024-01-02 PROCEDURE — 6360000002 HC RX W HCPCS

## 2024-01-02 PROCEDURE — 93005 ELECTROCARDIOGRAM TRACING: CPT

## 2024-01-02 PROCEDURE — C9113 INJ PANTOPRAZOLE SODIUM, VIA: HCPCS

## 2024-01-02 PROCEDURE — 76705 ECHO EXAM OF ABDOMEN: CPT

## 2024-01-02 PROCEDURE — 99285 EMERGENCY DEPT VISIT HI MDM: CPT

## 2024-01-02 PROCEDURE — 74176 CT ABD & PELVIS W/O CONTRAST: CPT

## 2024-01-02 PROCEDURE — 84484 ASSAY OF TROPONIN QUANT: CPT

## 2024-01-02 PROCEDURE — 96374 THER/PROPH/DIAG INJ IV PUSH: CPT

## 2024-01-02 PROCEDURE — 83605 ASSAY OF LACTIC ACID: CPT

## 2024-01-02 PROCEDURE — 80053 COMPREHEN METABOLIC PANEL: CPT

## 2024-01-02 PROCEDURE — 6370000000 HC RX 637 (ALT 250 FOR IP)

## 2024-01-02 RX ORDER — FAMOTIDINE 20 MG/1
20 TABLET, FILM COATED ORAL 2 TIMES DAILY
Qty: 10 TABLET | Refills: 0 | Status: SHIPPED | OUTPATIENT
Start: 2024-01-02

## 2024-01-02 RX ADMIN — ALUMINUM HYDROXIDE AND MAGNESIUM HYDROXIDE 30 ML: 200; 200 SUSPENSION ORAL at 13:40

## 2024-01-02 RX ADMIN — PANTOPRAZOLE SODIUM 40 MG: 40 INJECTION, POWDER, FOR SOLUTION INTRAVENOUS at 13:40

## 2024-01-02 NOTE — DISCHARGE INSTRUCTIONS
Today you were seen for abdominal pain.  Your exam and workup was reassuring.  Please  your medications at [facility].    Please return to the emergency room with any fevers, chills, chest pain, shortness of breath, worsening abdominal pain, and any other new or worsening symptoms concerning to you.

## 2024-01-02 NOTE — ED PROVIDER NOTES
change  Clinical Impression: no acute changes  Tash Wolf MD     Procedures     Procedures    ED Course     11:59 AM ARACELI BERMUDEZ (Tash Wolf MD) am the first provider for this patient. Initial assessment performed. I reviewed the vital signs, available nursing notes, past medical history, past surgical history, family history and social history. The patients presenting problems have been discussed, and they are in agreement with the care plan formulated and outlined with them.  I have encouraged them to ask questions as they arise throughout their visit.    Records Reviewed: Nursing Notes, Old Medical Records, and Clinical Records from a Different Specialty ([facility])    Is this patient to be included in the SEP-1 core measure? No Exclusion criteria - the patient is NOT to be included for SEP-1 Core Measure due to: 2+ SIRS criteria are not met    MEDICATIONS ADMINISTERED IN THE ED:  Medications   pantoprazole (PROTONIX) 40 mg in sodium chloride (PF) 0.9 % 10 mL injection (40 mg IntraVENous Given 1/2/24 1340)   aluminum-magnesium hydroxide 200-200 MG/5ML suspension 30 mL (30 mLs Oral Given 1/2/24 1340)     ED Course as of 01/02/24 1748   Tue Jan 02, 2024   1335 Late entry saw and evaluated, epigastric pain and some right upper quadrant component to it.  Will get CT scan as well as ultrasound if the CT does not reveal an obvious cause.  Consider pancreatitis as well.  I supervised the residents medical management plan. [CB]      ED Course User Index  [CB] Tom Elder MD       Medical Decision Making     CC/HPI Summary: 73-year-old female with past medical history of anemia, heart failure, chronic kidney disease, COPD, diabetes, HLD, hypertension presenting with 4 weeks of abdominal pain, nausea, diarrhea.    Ddx: Diverticulitis, gastroenteritis, GERD, adhesions, bowel obstruction, ACS    ED Course and Reassessment: Vital signs with hypertension, otherwise within normal limits. Physical exam significant for

## 2024-01-03 LAB
EKG ATRIAL RATE: 91 BPM
EKG DIAGNOSIS: NORMAL
EKG P AXIS: 70 DEGREES
EKG P-R INTERVAL: 172 MS
EKG Q-T INTERVAL: 382 MS
EKG QRS DURATION: 76 MS
EKG QTC CALCULATION (BAZETT): 469 MS
EKG R AXIS: 12 DEGREES
EKG T AXIS: 88 DEGREES
EKG VENTRICULAR RATE: 91 BPM

## 2024-01-03 PROCEDURE — 93010 ELECTROCARDIOGRAM REPORT: CPT | Performed by: INTERNAL MEDICINE

## 2024-01-04 ENCOUNTER — HOSPITAL ENCOUNTER (EMERGENCY)
Facility: HOSPITAL | Age: 74
Discharge: HOME | End: 2024-01-05
Payer: MEDICARE

## 2024-01-04 VITALS
WEIGHT: 192 LBS | BODY MASS INDEX: 32.78 KG/M2 | DIASTOLIC BLOOD PRESSURE: 59 MMHG | SYSTOLIC BLOOD PRESSURE: 148 MMHG | TEMPERATURE: 98.1 F | HEART RATE: 103 BPM | HEIGHT: 64 IN

## 2024-01-04 DIAGNOSIS — R10.84 GENERALIZED ABDOMINAL PAIN: Primary | ICD-10-CM

## 2024-01-04 PROCEDURE — 99283 EMERGENCY DEPT VISIT LOW MDM: CPT

## 2024-01-04 PROCEDURE — 4500000002 HC ER NO CHARGE

## 2024-01-04 ASSESSMENT — PAIN - FUNCTIONAL ASSESSMENT: PAIN_FUNCTIONAL_ASSESSMENT: 0-10

## 2024-01-04 ASSESSMENT — ENCOUNTER SYMPTOMS
VOMITING: 1
ABDOMINAL PAIN: 1
SHORTNESS OF BREATH: 0
NAUSEA: 1

## 2024-01-04 ASSESSMENT — PAIN DESCRIPTION - ORIENTATION: ORIENTATION: UPPER

## 2024-01-04 ASSESSMENT — PAIN DESCRIPTION - LOCATION: LOCATION: ABDOMEN

## 2024-01-04 ASSESSMENT — PAIN SCALES - GENERAL: PAINLEVEL_OUTOF10: 10

## 2024-01-05 NOTE — ED TRIAGE NOTES
Pt arrived via EMS from home c/o upper abdominal pain with vomiting usually after she eats. Pt has not followed up with her GI MD yet. States has been having the pain ever since they reversed her colostomy in June.

## 2024-01-05 NOTE — ED PROVIDER NOTES
EMERGENCY DEPARTMENT HISTORY AND PHYSICAL EXAM    1:56 AM      Date: 1/4/2024  Patient Name: Christy Donahue    History of Presenting Illness     Chief Complaint   Patient presents with    Abdominal Pain         History Provided By: Patient.     Additional History (Context): Christy Donahue is a 73 y.o. female with   Past Medical History:   Diagnosis Date    Anemia     Chondromalacia of both patellae     Chronic heart failure with preserved ejection fraction ([facility]) 01/13/2023    Chronic kidney disease     COPD (chronic obstructive pulmonary disease) ([facility]) 09/2015    mild-mod dz; Dr DEBORAH Yepez    Diabetes ([facility]) 2013    Dilated cardiomyopathy ([facility])     Dyslipidemia     Gout     Heart attack ([facility]) 10/18/2019    Stented    Hypercholesteremia 01/08/2014    Hypertension 2000    Mild protein-calorie malnutrition ([facility]) 8/7/2023    Obesity     Orthostatic hypotension 05/17/2016    Osteoarthritis of both knees     Popliteal cyst     Sleep apnea     not using cpap    Stage 3b chronic kidney disease ([facility]) 08/08/2022    Vitamin D deficiency 10/16/2014    who presents with c/o intermittent epigastric abdominal pain x 1 month. Pt notes intermittent n/v after eating. Pt notes she was evaluated 2 days ago for symptoms, notes no improvement.  Denies fever or chills, chest pain, shortness of breath, diarrhea, melena, bright red blood per rectum, dysuria, hematuria.  Notes she has not had outpatient follow-up with GI or PMD.     PCP: Kalli Louise APRN - NP    Current Facility-Administered Medications   Medication Dose Route Frequency Provider Last Rate Last Admin    famotidine (PEPCID) 20 mg in sodium chloride (PF) 0.9 % 10 mL injection  20 mg IntraVENous NOW Concepcion Odell PA         Current Outpatient Medications   Medication Sig Dispense Refill    famotidine (PEPCID) 20 MG tablet Take 1 tablet by mouth 2 times daily 10 tablet 0    aspirin 81 MG chewable tablet Take 1 tablet by mouth daily 30 tablet 3    apixaban (ELIQUIS) 5 MG  tablet by mouth daily     nitroGLYCERIN 0.4 MG SL tablet  Commonly known as: NITROSTAT  Place 1 tablet under the tongue every 5 minutes as needed for Chest pain up to max of 3 total doses. If no relief after 1 dose, call 911.     umeclidinium-vilanterol 62.5-25 MCG/ACT inhaler  Commonly known as: ANORO ELLIPTA               Dictation disclaimer:  Please note that this dictation was completed with [employer], the computer voice recognition software.  Quite often unanticipated grammatical, syntax, homophones, and other interpretive errors are inadvertently transcribed by the computer software.  Please disregard these errors.  Please excuse any errors that have escaped final proofreading.       Concepcion Odell PA  01/05/24 0159

## 2024-01-11 ENCOUNTER — APPOINTMENT (OUTPATIENT)
Facility: HOSPITAL | Age: 74
End: 2024-01-11
Payer: MEDICARE

## 2024-01-11 ENCOUNTER — HOSPITAL ENCOUNTER (EMERGENCY)
Facility: HOSPITAL | Age: 74
Discharge: HOME | End: 2024-01-11
Attending: EMERGENCY MEDICINE
Payer: MEDICARE

## 2024-01-11 VITALS
BODY MASS INDEX: 32.78 KG/M2 | RESPIRATION RATE: 16 BRPM | SYSTOLIC BLOOD PRESSURE: 120 MMHG | WEIGHT: 192 LBS | TEMPERATURE: 98.9 F | HEART RATE: 87 BPM | HEIGHT: 64 IN | OXYGEN SATURATION: 96 % | DIASTOLIC BLOOD PRESSURE: 66 MMHG

## 2024-01-11 DIAGNOSIS — S80.02XA CONTUSION OF LEFT KNEE, INITIAL ENCOUNTER: ICD-10-CM

## 2024-01-11 DIAGNOSIS — M17.12 TRICOMPARTMENT OSTEOARTHRITIS OF LEFT KNEE: Primary | ICD-10-CM

## 2024-01-11 PROCEDURE — 97161 PT EVAL LOW COMPLEX 20 MIN: CPT

## 2024-01-11 PROCEDURE — 73700 CT LOWER EXTREMITY W/O DYE: CPT

## 2024-01-11 PROCEDURE — 6370000000 HC RX 637 (ALT 250 FOR IP): Performed by: EMERGENCY MEDICINE

## 2024-01-11 PROCEDURE — 99284 EMERGENCY DEPT VISIT MOD MDM: CPT

## 2024-01-11 PROCEDURE — 94761 N-INVAS EAR/PLS OXIMETRY MLT: CPT

## 2024-01-11 PROCEDURE — 97165 OT EVAL LOW COMPLEX 30 MIN: CPT

## 2024-01-11 RX ORDER — TRAMADOL HYDROCHLORIDE 50 MG/1
50 TABLET ORAL EVERY 6 HOURS PRN
Qty: 15 TABLET | Refills: 0 | Status: SHIPPED | OUTPATIENT
Start: 2024-01-11 | End: 2024-01-16

## 2024-01-11 RX ORDER — OXYCODONE HYDROCHLORIDE AND ACETAMINOPHEN 5; 325 MG/1; MG/1
2 TABLET ORAL
Status: COMPLETED | OUTPATIENT
Start: 2024-01-11 | End: 2024-01-11

## 2024-01-11 RX ORDER — TRAMADOL HYDROCHLORIDE 50 MG/1
50 TABLET ORAL
Status: COMPLETED | OUTPATIENT
Start: 2024-01-11 | End: 2024-01-11

## 2024-01-11 RX ADMIN — TRAMADOL HYDROCHLORIDE 50 MG: 50 TABLET ORAL at 17:34

## 2024-01-11 RX ADMIN — OXYCODONE HYDROCHLORIDE AND ACETAMINOPHEN 2 TABLET: 5; 325 TABLET ORAL at 08:51

## 2024-01-11 ASSESSMENT — PAIN DESCRIPTION - LOCATION
LOCATION: KNEE
LOCATION: FOOT;KNEE
LOCATION: KNEE

## 2024-01-11 ASSESSMENT — PAIN - FUNCTIONAL ASSESSMENT: PAIN_FUNCTIONAL_ASSESSMENT: 0-10

## 2024-01-11 ASSESSMENT — PAIN DESCRIPTION - DESCRIPTORS: DESCRIPTORS: ACHING

## 2024-01-11 ASSESSMENT — PAIN SCALES - GENERAL
PAINLEVEL_OUTOF10: 10

## 2024-01-11 ASSESSMENT — PAIN DESCRIPTION - ORIENTATION
ORIENTATION: LEFT

## 2024-01-11 NOTE — ED PROVIDER NOTES
[facility] EMERGENCY DEPT  EMERGENCY DEPARTMENT ENCOUNTER    Patient Name: Christy Donahue  MRN: [id number]  YOB: 1950  Provider: Shabbir Morfin DO  PCP: Kalli Louise APRN - NP   Time/Date of evaluation: 7:06 AM EST on 1/11/24    History of Presenting Illness     History Provided by: Patient  History is limited by: Nothing     HISTORY:   Christy Donahue is a 73 y.o. female presents with a chief complaint of left knee pain.  Patient states that she had a follow-up appoint with her primary care provider yesterday and was referred to the emergency room for evaluation.  She states that she fell while using the bathroom landing on her knee.  Patient states that she underwent x-rays of her knee which revealed no abnormalities.  She states that she has been unable to weight-bear on her knee/leg due to the pain.  She denies any other injuries or loss of consciousness during the fall.    Nursing Notes were all reviewed and agreed with or any disagreements were addressed in the HPI.    Past History     PAST MEDICAL HISTORY:  Past Medical History:   Diagnosis Date    Anemia     Chondromalacia of both patellae     Chronic heart failure with preserved ejection fraction ([facility]) 01/13/2023    Chronic kidney disease     COPD (chronic obstructive pulmonary disease) ([facility]) 09/2015    mild-mod dz; Dr DEBORAH Yepez    Diabetes ([facility]) 2013    Dilated cardiomyopathy ([facility])     Dyslipidemia     Gout     Heart attack ([facility]) 10/18/2019    Stented    Hypercholesteremia 01/08/2014    Hypertension 2000    Mild protein-calorie malnutrition ([facility]) 8/7/2023    Obesity     Orthostatic hypotension 05/17/2016    Osteoarthritis of both knees     Popliteal cyst     Sleep apnea     not using cpap    Stage 3b chronic kidney disease ([facility]) 08/08/2022    Vitamin D deficiency 10/16/2014       PAST SURGICAL HISTORY:  Past Surgical History:   Procedure Laterality Date    CARDIAC CATHETERIZATION  2019    Stents x 2    CARDIAC PROCEDURE N/A 8/9/2023

## 2024-01-11 NOTE — ED NOTES
Pt discharged home with  via wheelchair in no acute distress. Pt given discharge instructions and had no further questions or concerns.

## 2024-01-11 NOTE — ED TRIAGE NOTES
Patient reports she was at [facility] yesterday  and fell from commode to bathroom floor on her left knee. Was treated for fall and now complains pain is worse and she can not walk

## 2024-01-12 NOTE — PROGRESS NOTES
OCCUPATIONAL THERAPY EVALUATION/DISCHARGE    Patient: Christy Donahue (73 y.o. female)  Date: 1/11/2024  Primary Diagnosis: No admission diagnoses are documented for this encounter.       Precautions: General Precautions, Fall Risk,  PLOF: Patient was receiving assist from her  with basic self care tasks and used a RW for functional mobility PTA secondary to pain.      ASSESSMENT AND RECOMMENDATIONS:  Patient alert and agreeable to therapy session.  She repeatedly stated that she couldn't stand due to pain in her LLE but was able to sit on EOB from supine with min assist as well as stand/transfer with standby assist.  Patient educated on pushing through her UEs while using the RW to compensate for the pain in her LE.  Understanding demonstrated.  Also discussed moving her BSC closer to her bed or living area and keeping her phone on her person while her  is at work.  She verbalized understanding.  Min assist needed for LB ADLs secondary to pain.  Patient stating that her  will assist.  She will benefit from AE training in home health to increase her functional independence.  Patient returned to supine in bed and left with all needs met at the end of the session.    Maximum therapeutic gains met at current level of care and patient will be discharged from occupational therapy at this time.    Further Equipment Recommendations for Discharge:  NA; pt has a RW and BSC    AMPAC: AM-PAC Inpatient Daily Activity Raw Score: 20    Current research shows that an AM-PAC score of 18 or greater is associated with a discharge to the patient's home setting.  Based on an AM-PAC score and their current ADL deficits; it is recommended that the patient have 2-3 sessions per week of Occupational Therapy at d/c to increase the patient's independence.      This AMPAC score should be considered in conjunction with interdisciplinary team recommendations to determine the most appropriate discharge setting. Patient's social 
Contact-guard assistance  Assistive Device: Walker, rolling  Speed/Marilee: Pace decreased (< 100 feet/min)  Step Length: Left shortened;Right shortened  Stance: Left decreased  Gait Abnormalities: Decreased step clearance   Pt ambulated ~10 ft throughout room with RW               Therapeutic Exercises/Neuromuscular Re-education:     Pain:  Pain level pre-treatment: 10/10   Pain level post-treatment: 10/10   Pain Intervention(s): Medication (see MAR); Rest  Response to intervention: Nurse notified     Activity Tolerance:   Activity Tolerance: Patient tolerated evaluation without incident;Patient limited by pain  Please refer to the flowsheet for vital signs taken during this treatment.    After treatment:   []         Patient left in no apparent distress sitting up in chair  [x]         Patient left in no apparent distress in bed  [x]         Call bell left within reach  [x]         Nursing notified  []         Caregiver present  []         Bed alarm activated  []         SCDs applied    COMMUNICATION/EDUCATION:   Patient Education  Education Given To: Patient  Education Provided: Role of Therapy;Plan of Care;Precautions;Transfer Training;Fall Prevention Strategies;Equipment  Education Method: Verbal;Teach Back  Barriers to Learning: None  Education Outcome: Verbalized understanding;Demonstrated understanding;Continued education needed    Thank you for this referral.  Keri Garcia, PT  Minutes: 15      Eval Complexity:  Low complexity

## 2024-01-13 ENCOUNTER — HOSPITAL ENCOUNTER (EMERGENCY)
Facility: HOSPITAL | Age: 74
Discharge: LEFT BEFORE DISCHARGE | End: 2024-01-13
Payer: MEDICARE

## 2024-01-13 ENCOUNTER — APPOINTMENT (OUTPATIENT)
Facility: HOSPITAL | Age: 74
End: 2024-01-13
Payer: MEDICARE

## 2024-01-13 VITALS
OXYGEN SATURATION: 99 % | SYSTOLIC BLOOD PRESSURE: 156 MMHG | WEIGHT: 192 LBS | HEIGHT: 64 IN | HEART RATE: 103 BPM | TEMPERATURE: 98.9 F | DIASTOLIC BLOOD PRESSURE: 69 MMHG | RESPIRATION RATE: 13 BRPM | BODY MASS INDEX: 32.78 KG/M2

## 2024-01-13 DIAGNOSIS — R10.84 GENERALIZED ABDOMINAL PAIN: Primary | ICD-10-CM

## 2024-01-13 LAB
ALBUMIN SERPL-MCNC: 2.8 G/DL (ref 3.4–5)
ALBUMIN/GLOB SERPL: 0.9 (ref 0.8–1.7)
ALP SERPL-CCNC: 113 U/L (ref 45–117)
ALT SERPL-CCNC: 14 U/L (ref 13–56)
ANION GAP SERPL CALC-SCNC: 6 MMOL/L (ref 3–18)
AST SERPL-CCNC: 22 U/L (ref 10–38)
BASOPHILS # BLD: 0 K/UL (ref 0–0.1)
BASOPHILS NFR BLD: 1 % (ref 0–2)
BILIRUB SERPL-MCNC: 0.8 MG/DL (ref 0.2–1)
BUN SERPL-MCNC: 27 MG/DL (ref 7–18)
BUN/CREAT SERPL: 17 (ref 12–20)
CALCIUM SERPL-MCNC: 9.2 MG/DL (ref 8.5–10.1)
CHLORIDE SERPL-SCNC: 113 MMOL/L (ref 100–111)
CO2 SERPL-SCNC: 22 MMOL/L (ref 21–32)
CREAT SERPL-MCNC: 1.63 MG/DL (ref 0.6–1.3)
DIFFERENTIAL METHOD BLD: ABNORMAL
EKG ATRIAL RATE: 106 BPM
EKG DIAGNOSIS: NORMAL
EKG P AXIS: 73 DEGREES
EKG P-R INTERVAL: 162 MS
EKG Q-T INTERVAL: 356 MS
EKG QRS DURATION: 82 MS
EKG QTC CALCULATION (BAZETT): 472 MS
EKG R AXIS: 8 DEGREES
EKG T AXIS: 89 DEGREES
EKG VENTRICULAR RATE: 106 BPM
EOSINOPHIL # BLD: 0.1 K/UL (ref 0–0.4)
EOSINOPHIL NFR BLD: 1 % (ref 0–5)
ERYTHROCYTE [DISTWIDTH] IN BLOOD BY AUTOMATED COUNT: 16.9 % (ref 11.6–14.5)
GLOBULIN SER CALC-MCNC: 3.2 G/DL (ref 2–4)
GLUCOSE SERPL-MCNC: 105 MG/DL (ref 74–99)
HCT VFR BLD AUTO: 29.6 % (ref 35–45)
HGB BLD-MCNC: 9.3 G/DL (ref 12–16)
IMM GRANULOCYTES # BLD AUTO: 0.1 K/UL (ref 0–0.04)
IMM GRANULOCYTES NFR BLD AUTO: 2 % (ref 0–0.5)
LYMPHOCYTES # BLD: 1.3 K/UL (ref 0.9–3.6)
LYMPHOCYTES NFR BLD: 17 % (ref 21–52)
MCH RBC QN AUTO: 29.1 PG (ref 24–34)
MCHC RBC AUTO-ENTMCNC: 31.4 G/DL (ref 31–37)
MCV RBC AUTO: 92.5 FL (ref 78–100)
MONOCYTES # BLD: 0.9 K/UL (ref 0.05–1.2)
MONOCYTES NFR BLD: 12 % (ref 3–10)
NEUTS SEG # BLD: 4.9 K/UL (ref 1.8–8)
NEUTS SEG NFR BLD: 68 % (ref 40–73)
NRBC # BLD: 0.06 K/UL (ref 0–0.01)
NRBC BLD-RTO: 0.8 PER 100 WBC
NT PRO BNP: 3226 PG/ML (ref 0–900)
PLATELET # BLD AUTO: 384 K/UL (ref 135–420)
PMV BLD AUTO: 9.2 FL (ref 9.2–11.8)
POTASSIUM SERPL-SCNC: 5.2 MMOL/L (ref 3.5–5.5)
PROT SERPL-MCNC: 6 G/DL (ref 6.4–8.2)
RBC # BLD AUTO: 3.2 M/UL (ref 4.2–5.3)
SODIUM SERPL-SCNC: 141 MMOL/L (ref 136–145)
TROPONIN I SERPL HS-MCNC: 35 NG/L (ref 0–54)
WBC # BLD AUTO: 7.3 K/UL (ref 4.6–13.2)

## 2024-01-13 PROCEDURE — 85025 COMPLETE CBC W/AUTO DIFF WBC: CPT

## 2024-01-13 PROCEDURE — 93005 ELECTROCARDIOGRAM TRACING: CPT

## 2024-01-13 PROCEDURE — 99285 EMERGENCY DEPT VISIT HI MDM: CPT

## 2024-01-13 PROCEDURE — 93010 ELECTROCARDIOGRAM REPORT: CPT | Performed by: INTERNAL MEDICINE

## 2024-01-13 PROCEDURE — 71045 X-RAY EXAM CHEST 1 VIEW: CPT

## 2024-01-13 PROCEDURE — 84484 ASSAY OF TROPONIN QUANT: CPT

## 2024-01-13 PROCEDURE — 80053 COMPREHEN METABOLIC PANEL: CPT

## 2024-01-13 PROCEDURE — 83880 ASSAY OF NATRIURETIC PEPTIDE: CPT

## 2024-01-13 NOTE — ED PROVIDER NOTES
EMERGENCY DEPARTMENT HISTORY AND PHYSICAL EXAM      Patient Name: Christy Donahue  MRN: [id number]  YOB: 1950  Provider: Concepcion Prater PA-C  PCP: Kalli Louise APRN - NP   Time/Date of evaluation: 9:11 AM EST on 1/13/24    History of Presenting Illness     Chief Complaint   Patient presents with    Abdominal Pain    Shortness of Breath       History Provided By: Patient     History (Narative):   Christy Donahue is a 73 y.o. female with a PMHX of of anemia, COPD, dyslipidemia, hypercholesteremia, hypertension, stage III kidney disease  who presents to the emergency department  by EMS C/O shortness of breath and abdominal pain.  Patient states that shortness of breath and abdominal pain started approximately 4 hours ago.  She also endorses sharp pain along her sternum which started at the same time, as well as nausea and vomiting. Patient endorses 4 episodes of vomiting.  When asked patient, how often she has abdominal pain she \"when it hurts it hurts \"      Patient denies diarrhea, constipation, bloating, any recent changes in diet, fever or chills, hematemesis, patient is currently taking a blood thinner and has been compliant with.       After review of chart, it appears that patient has had a recent CT scan, transvaginal ultrasound, abdominal ultrasound, and recent CTA that were negative.     Past History     Past Medical History:  Past Medical History:   Diagnosis Date    Anemia     Chondromalacia of both patellae     Chronic heart failure with preserved ejection fraction ([facility]) 01/13/2023    Chronic kidney disease     COPD (chronic obstructive pulmonary disease) ([facility]) 09/2015    mild-mod dz; Dr DEBORAH Yepez    Diabetes ([facility]) 2013    Dilated cardiomyopathy ([facility])     Dyslipidemia     Gout     Heart attack ([facility]) 10/18/2019    Stented    Hypercholesteremia 01/08/2014    Hypertension 2000    Mild protein-calorie malnutrition ([facility]) 8/7/2023    Obesity     Orthostatic hypotension 05/17/2016     Never    Drug use: Never       Medications:  Current Facility-Administered Medications   Medication Dose Route Frequency Provider Last Rate Last Admin    iopamidol (ISOVUE-300) 61 % injection 100 mL  100 mL IntraVENous ONCE PRN Concepcion Stauffer PA-C         Current Outpatient Medications   Medication Sig Dispense Refill    traMADol (ULTRAM) 50 MG tablet Take 1 tablet by mouth every 6 hours as needed for Pain for up to 5 days. Intended supply: 3 days. Take lowest dose possible to manage pain Max Daily Amount: 200 mg 15 tablet 0    famotidine (PEPCID) 20 MG tablet Take 1 tablet by mouth 2 times daily 10 tablet 0    aspirin 81 MG chewable tablet Take 1 tablet by mouth daily 30 tablet 3    apixaban (ELIQUIS) 5 MG TABS tablet Take 1 tablet by mouth 2 times daily 60 tablet 2    bumetanide (BUMEX) 2 MG tablet Take 1 tablet by mouth every other day 30 tablet 3    Oxygen Concentrator Inhale 2 L into the lungs continuous.      gabapentin (NEURONTIN) 400 MG capsule Take 1 capsule by mouth every evening.      insulin detemir (LEVEMIR) 100 UNIT/ML injection pen Inject 15 Units into the skin nightly      nitroGLYCERIN (NITROSTAT) 0.4 MG SL tablet Place 1 tablet under the tongue every 5 minutes as needed for Chest pain up to max of 3 total doses. If no relief after 1 dose, call 911. 25 tablet 3    atorvastatin (LIPITOR) 40 MG tablet Take 1 tablet by mouth nightly 30 tablet 1    metoprolol succinate (TOPROL XL) 25 MG extended release tablet Take 1 tablet by mouth daily 30 tablet 1    albuterol sulfate HFA (PROVENTIL;VENTOLIN;PROAIR) 108 (90 Base) MCG/ACT inhaler Inhale 2 puffs into the lungs every 6 hours as needed      ipratropium-albuterol (DUONEB) 0.5-2.5 (3) MG/3ML SOLN nebulizer solution Inhale 3 mLs into the lungs every 6 hours as needed      Umeclidinium-Vilanterol 62.5-25 MCG/ACT AEPB Inhale 1 puff into the lungs daily         Allergies:  No Known Allergies    Social Determinants of Health:  Social Determinants of

## 2024-01-13 NOTE — ED NOTES
Client unwilling to wait for treatment. Requested to have IV removed and leave facility.  into room, also request equiment to be removed to escort client home. Transport at room entrance for CT scan, client equipment and IV removed. AMA form signed and given to provider. Client placed in wheelchair and left ER per spouse. Encouraged to return to ER if symptoms returned.

## 2024-01-13 NOTE — ED TRIAGE NOTES
From home, c/o of sob and mid abdominal pain. Client was in verbal argument with  on scene per ems. Client was very anxious and visibly upset during ems arrival. O2 sat 96% and clear on all fields. FSBS of 151.

## 2024-01-28 ENCOUNTER — HOSPITAL ENCOUNTER (INPATIENT)
Facility: HOSPITAL | Age: 74
LOS: 8 days | Discharge: HOME HEALTH | DRG: 291 | End: 2024-02-06
Attending: EMERGENCY MEDICINE | Admitting: HOSPITALIST
Payer: MEDICARE

## 2024-01-28 ENCOUNTER — APPOINTMENT (OUTPATIENT)
Facility: HOSPITAL | Age: 74
DRG: 291 | End: 2024-01-28
Payer: MEDICARE

## 2024-01-28 DIAGNOSIS — I82.432 ACUTE DEEP VEIN THROMBOSIS (DVT) OF POPLITEAL VEIN OF LEFT LOWER EXTREMITY (HCC): Primary | ICD-10-CM

## 2024-01-28 DIAGNOSIS — N18.6 ESRD (END STAGE RENAL DISEASE) ON DIALYSIS (HCC): ICD-10-CM

## 2024-01-28 DIAGNOSIS — I50.43 ACUTE ON CHRONIC HEART FAILURE WITH REDUCED EJECTION FRACTION AND DIASTOLIC DYSFUNCTION (HCC): ICD-10-CM

## 2024-01-28 DIAGNOSIS — I42.0 DILATED CARDIOMYOPATHY (HCC): ICD-10-CM

## 2024-01-28 DIAGNOSIS — M10.9 ACUTE GOUT OF LEFT FOOT, UNSPECIFIED CAUSE: ICD-10-CM

## 2024-01-28 DIAGNOSIS — N17.0 ACUTE RENAL FAILURE WITH ACUTE TUBULAR NECROSIS SUPERIMPOSED ON STAGE 5 CHRONIC KIDNEY DISEASE, NOT ON CHRONIC DIALYSIS (HCC): ICD-10-CM

## 2024-01-28 DIAGNOSIS — I50.9 ACUTE ON CHRONIC CONGESTIVE HEART FAILURE, UNSPECIFIED HEART FAILURE TYPE (HCC): ICD-10-CM

## 2024-01-28 DIAGNOSIS — M79.672 LEFT FOOT PAIN: ICD-10-CM

## 2024-01-28 DIAGNOSIS — R06.09 EXERTIONAL DYSPNEA: ICD-10-CM

## 2024-01-28 DIAGNOSIS — Z99.2 ESRD (END STAGE RENAL DISEASE) ON DIALYSIS (HCC): ICD-10-CM

## 2024-01-28 DIAGNOSIS — N18.5 ACUTE RENAL FAILURE WITH ACUTE TUBULAR NECROSIS SUPERIMPOSED ON STAGE 5 CHRONIC KIDNEY DISEASE, NOT ON CHRONIC DIALYSIS (HCC): ICD-10-CM

## 2024-01-28 LAB
ALBUMIN SERPL-MCNC: 3.1 G/DL (ref 3.4–5)
ALBUMIN/GLOB SERPL: 1.3 (ref 0.8–1.7)
ALP SERPL-CCNC: 132 U/L (ref 45–117)
ALT SERPL-CCNC: 18 U/L (ref 13–56)
ANION GAP SERPL CALC-SCNC: 2 MMOL/L (ref 3–18)
AST SERPL-CCNC: 16 U/L (ref 10–38)
BASOPHILS # BLD: 0 K/UL (ref 0–0.1)
BASOPHILS NFR BLD: 0 % (ref 0–2)
BILIRUB SERPL-MCNC: 0.5 MG/DL (ref 0.2–1)
BUN SERPL-MCNC: 18 MG/DL (ref 7–18)
BUN/CREAT SERPL: 9 (ref 12–20)
CALCIUM SERPL-MCNC: 8.8 MG/DL (ref 8.5–10.1)
CHLORIDE SERPL-SCNC: 116 MMOL/L (ref 100–111)
CO2 SERPL-SCNC: 26 MMOL/L (ref 21–32)
CREAT SERPL-MCNC: 2.06 MG/DL (ref 0.6–1.3)
DIFFERENTIAL METHOD BLD: ABNORMAL
EOSINOPHIL # BLD: 0.1 K/UL (ref 0–0.4)
EOSINOPHIL NFR BLD: 1 % (ref 0–5)
ERYTHROCYTE [DISTWIDTH] IN BLOOD BY AUTOMATED COUNT: 16.2 % (ref 11.6–14.5)
GLOBULIN SER CALC-MCNC: 2.4 G/DL (ref 2–4)
GLUCOSE SERPL-MCNC: 116 MG/DL (ref 74–99)
HCT VFR BLD AUTO: 28.1 % (ref 35–45)
HGB BLD-MCNC: 8.3 G/DL (ref 12–16)
IMM GRANULOCYTES # BLD AUTO: 0.1 K/UL (ref 0–0.04)
IMM GRANULOCYTES NFR BLD AUTO: 1 % (ref 0–0.5)
LACTATE SERPL-SCNC: 3.6 MMOL/L (ref 0.4–2)
LYMPHOCYTES # BLD: 1.4 K/UL (ref 0.9–3.6)
LYMPHOCYTES NFR BLD: 15 % (ref 21–52)
MAGNESIUM SERPL-MCNC: 1.6 MG/DL (ref 1.6–2.6)
MCH RBC QN AUTO: 29.6 PG (ref 24–34)
MCHC RBC AUTO-ENTMCNC: 29.5 G/DL (ref 31–37)
MCV RBC AUTO: 100.4 FL (ref 78–100)
MONOCYTES # BLD: 0.6 K/UL (ref 0.05–1.2)
MONOCYTES NFR BLD: 7 % (ref 3–10)
NEUTS SEG # BLD: 7.2 K/UL (ref 1.8–8)
NEUTS SEG NFR BLD: 77 % (ref 40–73)
NRBC # BLD: 0.03 K/UL (ref 0–0.01)
NRBC BLD-RTO: 0.3 PER 100 WBC
NT PRO BNP: 5641 PG/ML (ref 0–900)
PLATELET # BLD AUTO: 386 K/UL (ref 135–420)
PMV BLD AUTO: 9.3 FL (ref 9.2–11.8)
POTASSIUM SERPL-SCNC: 5 MMOL/L (ref 3.5–5.5)
PROT SERPL-MCNC: 5.5 G/DL (ref 6.4–8.2)
RBC # BLD AUTO: 2.8 M/UL (ref 4.2–5.3)
SODIUM SERPL-SCNC: 144 MMOL/L (ref 136–145)
TROPONIN I SERPL HS-MCNC: 39 NG/L (ref 0–54)
WBC # BLD AUTO: 9.4 K/UL (ref 4.6–13.2)

## 2024-01-28 PROCEDURE — 85025 COMPLETE CBC W/AUTO DIFF WBC: CPT

## 2024-01-28 PROCEDURE — 93005 ELECTROCARDIOGRAM TRACING: CPT | Performed by: EMERGENCY MEDICINE

## 2024-01-28 PROCEDURE — 84484 ASSAY OF TROPONIN QUANT: CPT

## 2024-01-28 PROCEDURE — 96374 THER/PROPH/DIAG INJ IV PUSH: CPT

## 2024-01-28 PROCEDURE — 83880 ASSAY OF NATRIURETIC PEPTIDE: CPT

## 2024-01-28 PROCEDURE — 71045 X-RAY EXAM CHEST 1 VIEW: CPT

## 2024-01-28 PROCEDURE — 83735 ASSAY OF MAGNESIUM: CPT

## 2024-01-28 PROCEDURE — 0202U NFCT DS 22 TRGT SARS-COV-2: CPT

## 2024-01-28 PROCEDURE — 99285 EMERGENCY DEPT VISIT HI MDM: CPT

## 2024-01-28 PROCEDURE — 80053 COMPREHEN METABOLIC PANEL: CPT

## 2024-01-28 PROCEDURE — 83605 ASSAY OF LACTIC ACID: CPT

## 2024-01-28 RX ORDER — BUMETANIDE 0.25 MG/ML
1 INJECTION INTRAMUSCULAR; INTRAVENOUS ONCE
Status: COMPLETED | OUTPATIENT
Start: 2024-01-29 | End: 2024-01-29

## 2024-01-29 PROBLEM — R09.02 HYPOXIA: Status: ACTIVE | Noted: 2024-01-29

## 2024-01-29 PROBLEM — I50.43 CHF (CONGESTIVE HEART FAILURE), NYHA CLASS I, ACUTE ON CHRONIC, COMBINED (HCC): Status: ACTIVE | Noted: 2024-01-29

## 2024-01-29 PROBLEM — I42.0 DILATED CARDIOMYOPATHY (HCC): Status: ACTIVE | Noted: 2024-01-29

## 2024-01-29 PROBLEM — E66.01 SEVERE OBESITY (BMI 35.0-35.9 WITH COMORBIDITY) (HCC): Status: ACTIVE | Noted: 2024-01-29

## 2024-01-29 PROBLEM — J44.9 COPD, MILD (HCC): Status: ACTIVE | Noted: 2024-01-29

## 2024-01-29 PROBLEM — I50.43 ACUTE ON CHRONIC COMBINED SYSTOLIC AND DIASTOLIC CHF (CONGESTIVE HEART FAILURE) (HCC): Status: ACTIVE | Noted: 2024-01-29

## 2024-01-29 LAB
ANION GAP SERPL CALC-SCNC: 3 MMOL/L (ref 3–18)
B PERT DNA SPEC QL NAA+PROBE: NOT DETECTED
BORDETELLA PARAPERTUSSIS BY PCR: NOT DETECTED
BUN SERPL-MCNC: 16 MG/DL (ref 7–18)
BUN/CREAT SERPL: 8 (ref 12–20)
C PNEUM DNA SPEC QL NAA+PROBE: NOT DETECTED
CALCIUM SERPL-MCNC: 8.9 MG/DL (ref 8.5–10.1)
CHLORIDE SERPL-SCNC: 115 MMOL/L (ref 100–111)
CO2 SERPL-SCNC: 24 MMOL/L (ref 21–32)
CREAT SERPL-MCNC: 2 MG/DL (ref 0.6–1.3)
EKG ATRIAL RATE: 128 BPM
EKG DIAGNOSIS: NORMAL
EKG P AXIS: 71 DEGREES
EKG P-R INTERVAL: 150 MS
EKG Q-T INTERVAL: 310 MS
EKG QRS DURATION: 74 MS
EKG QTC CALCULATION (BAZETT): 452 MS
EKG R AXIS: 50 DEGREES
EKG T AXIS: 69 DEGREES
EKG VENTRICULAR RATE: 128 BPM
FLUAV SUBTYP SPEC NAA+PROBE: NOT DETECTED
FLUBV RNA SPEC QL NAA+PROBE: NOT DETECTED
GLUCOSE BLD STRIP.AUTO-MCNC: 120 MG/DL (ref 70–110)
GLUCOSE BLD STRIP.AUTO-MCNC: 123 MG/DL (ref 70–110)
GLUCOSE BLD STRIP.AUTO-MCNC: 157 MG/DL (ref 70–110)
GLUCOSE BLD STRIP.AUTO-MCNC: 215 MG/DL (ref 70–110)
GLUCOSE SERPL-MCNC: 98 MG/DL (ref 74–99)
HADV DNA SPEC QL NAA+PROBE: NOT DETECTED
HCOV 229E RNA SPEC QL NAA+PROBE: NOT DETECTED
HCOV HKU1 RNA SPEC QL NAA+PROBE: NOT DETECTED
HCOV NL63 RNA SPEC QL NAA+PROBE: NOT DETECTED
HCOV OC43 RNA SPEC QL NAA+PROBE: NOT DETECTED
HMPV RNA SPEC QL NAA+PROBE: NOT DETECTED
HPIV1 RNA SPEC QL NAA+PROBE: NOT DETECTED
HPIV2 RNA SPEC QL NAA+PROBE: NOT DETECTED
HPIV3 RNA SPEC QL NAA+PROBE: NOT DETECTED
HPIV4 RNA SPEC QL NAA+PROBE: NOT DETECTED
LACTATE SERPL-SCNC: 1.3 MMOL/L (ref 0.4–2)
M PNEUMO DNA SPEC QL NAA+PROBE: NOT DETECTED
MAGNESIUM SERPL-MCNC: 1.3 MG/DL (ref 1.6–2.6)
PHOSPHATE SERPL-MCNC: 3.8 MG/DL (ref 2.5–4.9)
POTASSIUM SERPL-SCNC: 5.6 MMOL/L (ref 3.5–5.5)
RSV RNA SPEC QL NAA+PROBE: NOT DETECTED
RV+EV RNA SPEC QL NAA+PROBE: NOT DETECTED
SARS-COV-2 RNA RESP QL NAA+PROBE: NOT DETECTED
SODIUM SERPL-SCNC: 142 MMOL/L (ref 136–145)
TROPONIN I SERPL HS-MCNC: 41 NG/L (ref 0–54)

## 2024-01-29 PROCEDURE — 6360000002 HC RX W HCPCS: Performed by: EMERGENCY MEDICINE

## 2024-01-29 PROCEDURE — 99222 1ST HOSP IP/OBS MODERATE 55: CPT | Performed by: INTERNAL MEDICINE

## 2024-01-29 PROCEDURE — 80048 BASIC METABOLIC PNL TOTAL CA: CPT

## 2024-01-29 PROCEDURE — 93010 ELECTROCARDIOGRAM REPORT: CPT | Performed by: INTERNAL MEDICINE

## 2024-01-29 PROCEDURE — 6360000002 HC RX W HCPCS: Performed by: HOSPITALIST

## 2024-01-29 PROCEDURE — 94664 DEMO&/EVAL PT USE INHALER: CPT

## 2024-01-29 PROCEDURE — 2700000000 HC OXYGEN THERAPY PER DAY

## 2024-01-29 PROCEDURE — 84100 ASSAY OF PHOSPHORUS: CPT

## 2024-01-29 PROCEDURE — 1100000003 HC PRIVATE W/ TELEMETRY

## 2024-01-29 PROCEDURE — 2580000003 HC RX 258: Performed by: HOSPITALIST

## 2024-01-29 PROCEDURE — 83605 ASSAY OF LACTIC ACID: CPT

## 2024-01-29 PROCEDURE — 6360000002 HC RX W HCPCS: Performed by: PHYSICIAN ASSISTANT

## 2024-01-29 PROCEDURE — 6370000000 HC RX 637 (ALT 250 FOR IP): Performed by: HOSPITALIST

## 2024-01-29 PROCEDURE — 82962 GLUCOSE BLOOD TEST: CPT

## 2024-01-29 PROCEDURE — 83735 ASSAY OF MAGNESIUM: CPT

## 2024-01-29 PROCEDURE — 99223 1ST HOSP IP/OBS HIGH 75: CPT | Performed by: HOSPITALIST

## 2024-01-29 PROCEDURE — 6360000002 HC RX W HCPCS: Performed by: INTERNAL MEDICINE

## 2024-01-29 PROCEDURE — 36415 COLL VENOUS BLD VENIPUNCTURE: CPT

## 2024-01-29 PROCEDURE — 99223 1ST HOSP IP/OBS HIGH 75: CPT | Performed by: INTERNAL MEDICINE

## 2024-01-29 PROCEDURE — 94761 N-INVAS EAR/PLS OXIMETRY MLT: CPT

## 2024-01-29 PROCEDURE — 94640 AIRWAY INHALATION TREATMENT: CPT

## 2024-01-29 PROCEDURE — 84484 ASSAY OF TROPONIN QUANT: CPT

## 2024-01-29 RX ORDER — SODIUM CHLORIDE 0.9 % (FLUSH) 0.9 %
5-40 SYRINGE (ML) INJECTION PRN
Status: DISCONTINUED | OUTPATIENT
Start: 2024-01-29 | End: 2024-02-06 | Stop reason: HOSPADM

## 2024-01-29 RX ORDER — INSULIN GLARGINE 100 [IU]/ML
12 INJECTION, SOLUTION SUBCUTANEOUS DAILY
Status: DISCONTINUED | OUTPATIENT
Start: 2024-01-29 | End: 2024-02-06 | Stop reason: HOSPADM

## 2024-01-29 RX ORDER — SODIUM CHLORIDE 9 MG/ML
INJECTION, SOLUTION INTRAVENOUS PRN
Status: DISCONTINUED | OUTPATIENT
Start: 2024-01-29 | End: 2024-02-06 | Stop reason: HOSPADM

## 2024-01-29 RX ORDER — SODIUM CHLORIDE 0.9 % (FLUSH) 0.9 %
5-40 SYRINGE (ML) INJECTION EVERY 12 HOURS SCHEDULED
Status: DISCONTINUED | OUTPATIENT
Start: 2024-01-29 | End: 2024-02-06 | Stop reason: HOSPADM

## 2024-01-29 RX ORDER — DEXTROSE MONOHYDRATE 100 MG/ML
INJECTION, SOLUTION INTRAVENOUS CONTINUOUS PRN
Status: DISCONTINUED | OUTPATIENT
Start: 2024-01-29 | End: 2024-02-06 | Stop reason: HOSPADM

## 2024-01-29 RX ORDER — ONDANSETRON 4 MG/1
4 TABLET, ORALLY DISINTEGRATING ORAL EVERY 8 HOURS PRN
Status: DISCONTINUED | OUTPATIENT
Start: 2024-01-29 | End: 2024-02-06 | Stop reason: HOSPADM

## 2024-01-29 RX ORDER — ATORVASTATIN CALCIUM 40 MG/1
40 TABLET, FILM COATED ORAL NIGHTLY
Status: DISCONTINUED | OUTPATIENT
Start: 2024-01-29 | End: 2024-02-06 | Stop reason: HOSPADM

## 2024-01-29 RX ORDER — MAGNESIUM SULFATE IN WATER 40 MG/ML
2000 INJECTION, SOLUTION INTRAVENOUS ONCE
Status: COMPLETED | OUTPATIENT
Start: 2024-01-29 | End: 2024-01-29

## 2024-01-29 RX ORDER — METOPROLOL SUCCINATE 25 MG/1
25 TABLET, EXTENDED RELEASE ORAL DAILY
Status: DISCONTINUED | OUTPATIENT
Start: 2024-01-29 | End: 2024-02-06 | Stop reason: HOSPADM

## 2024-01-29 RX ORDER — ACETAMINOPHEN 650 MG/1
650 SUPPOSITORY RECTAL EVERY 6 HOURS PRN
Status: DISCONTINUED | OUTPATIENT
Start: 2024-01-29 | End: 2024-02-06 | Stop reason: HOSPADM

## 2024-01-29 RX ORDER — IPRATROPIUM BROMIDE AND ALBUTEROL SULFATE 2.5; .5 MG/3ML; MG/3ML
1 SOLUTION RESPIRATORY (INHALATION) EVERY 6 HOURS PRN
Status: DISCONTINUED | OUTPATIENT
Start: 2024-01-29 | End: 2024-02-06 | Stop reason: HOSPADM

## 2024-01-29 RX ORDER — INSULIN LISPRO 100 [IU]/ML
0-4 INJECTION, SOLUTION INTRAVENOUS; SUBCUTANEOUS NIGHTLY
Status: DISCONTINUED | OUTPATIENT
Start: 2024-01-29 | End: 2024-02-06 | Stop reason: HOSPADM

## 2024-01-29 RX ORDER — ASPIRIN 81 MG/1
81 TABLET, CHEWABLE ORAL DAILY
Status: DISCONTINUED | OUTPATIENT
Start: 2024-01-29 | End: 2024-02-06 | Stop reason: HOSPADM

## 2024-01-29 RX ORDER — ACETAMINOPHEN 325 MG/1
650 TABLET ORAL EVERY 6 HOURS PRN
Status: DISCONTINUED | OUTPATIENT
Start: 2024-01-29 | End: 2024-02-06 | Stop reason: HOSPADM

## 2024-01-29 RX ORDER — INSULIN LISPRO 100 [IU]/ML
0-4 INJECTION, SOLUTION INTRAVENOUS; SUBCUTANEOUS
Status: DISCONTINUED | OUTPATIENT
Start: 2024-01-29 | End: 2024-02-06 | Stop reason: HOSPADM

## 2024-01-29 RX ORDER — ONDANSETRON 2 MG/ML
4 INJECTION INTRAMUSCULAR; INTRAVENOUS EVERY 6 HOURS PRN
Status: DISCONTINUED | OUTPATIENT
Start: 2024-01-29 | End: 2024-02-06 | Stop reason: HOSPADM

## 2024-01-29 RX ORDER — GABAPENTIN 400 MG/1
400 CAPSULE ORAL EVERY EVENING
Status: DISCONTINUED | OUTPATIENT
Start: 2024-01-29 | End: 2024-02-06 | Stop reason: HOSPADM

## 2024-01-29 RX ORDER — LANOLIN ALCOHOL/MO/W.PET/CERES
400 CREAM (GRAM) TOPICAL DAILY
Status: COMPLETED | OUTPATIENT
Start: 2024-01-30 | End: 2024-02-04

## 2024-01-29 RX ORDER — ARFORMOTEROL TARTRATE 15 UG/2ML
15 SOLUTION RESPIRATORY (INHALATION)
Status: DISCONTINUED | OUTPATIENT
Start: 2024-01-29 | End: 2024-02-06 | Stop reason: HOSPADM

## 2024-01-29 RX ORDER — BUMETANIDE 0.25 MG/ML
1 INJECTION INTRAMUSCULAR; INTRAVENOUS 2 TIMES DAILY
Status: DISCONTINUED | OUTPATIENT
Start: 2024-01-29 | End: 2024-01-30

## 2024-01-29 RX ORDER — POLYETHYLENE GLYCOL 3350 17 G/17G
17 POWDER, FOR SOLUTION ORAL DAILY PRN
Status: DISCONTINUED | OUTPATIENT
Start: 2024-01-29 | End: 2024-01-31

## 2024-01-29 RX ORDER — FAMOTIDINE 20 MG/1
20 TABLET, FILM COATED ORAL DAILY
Status: DISCONTINUED | OUTPATIENT
Start: 2024-01-29 | End: 2024-02-06 | Stop reason: HOSPADM

## 2024-01-29 RX ADMIN — APIXABAN 5 MG: 5 TABLET, FILM COATED ORAL at 21:46

## 2024-01-29 RX ADMIN — FAMOTIDINE 20 MG: 20 TABLET ORAL at 09:27

## 2024-01-29 RX ADMIN — ATORVASTATIN CALCIUM 40 MG: 40 TABLET, FILM COATED ORAL at 21:46

## 2024-01-29 RX ADMIN — ARFORMOTEROL TARTRATE 15 MCG: 15 SOLUTION RESPIRATORY (INHALATION) at 20:19

## 2024-01-29 RX ADMIN — ASPIRIN 81 MG: 81 TABLET, CHEWABLE ORAL at 09:27

## 2024-01-29 RX ADMIN — BUMETANIDE 1 MG: 0.25 INJECTION INTRAMUSCULAR; INTRAVENOUS at 21:47

## 2024-01-29 RX ADMIN — ACETAMINOPHEN 325MG 650 MG: 325 TABLET ORAL at 11:57

## 2024-01-29 RX ADMIN — SODIUM CHLORIDE, PRESERVATIVE FREE 10 ML: 5 INJECTION INTRAVENOUS at 09:29

## 2024-01-29 RX ADMIN — SODIUM ZIRCONIUM CYCLOSILICATE 5 G: 5 POWDER, FOR SUSPENSION ORAL at 16:39

## 2024-01-29 RX ADMIN — APIXABAN 5 MG: 5 TABLET, FILM COATED ORAL at 09:27

## 2024-01-29 RX ADMIN — INSULIN GLARGINE 12 UNITS: 100 INJECTION, SOLUTION SUBCUTANEOUS at 09:28

## 2024-01-29 RX ADMIN — MAGNESIUM SULFATE HEPTAHYDRATE 2000 MG: 40 INJECTION, SOLUTION INTRAVENOUS at 14:08

## 2024-01-29 RX ADMIN — WATER 40 MG: 1 INJECTION INTRAMUSCULAR; INTRAVENOUS; SUBCUTANEOUS at 14:03

## 2024-01-29 RX ADMIN — GABAPENTIN 400 MG: 400 CAPSULE ORAL at 17:33

## 2024-01-29 RX ADMIN — BUMETANIDE 1 MG: 0.25 INJECTION INTRAMUSCULAR; INTRAVENOUS at 09:27

## 2024-01-29 RX ADMIN — BUMETANIDE 1 MG: 0.25 INJECTION INTRAMUSCULAR; INTRAVENOUS at 00:02

## 2024-01-29 RX ADMIN — SODIUM CHLORIDE, PRESERVATIVE FREE 10 ML: 5 INJECTION INTRAVENOUS at 21:51

## 2024-01-29 RX ADMIN — IPRATROPIUM BROMIDE 0.5 MG: 0.5 SOLUTION RESPIRATORY (INHALATION) at 20:20

## 2024-01-29 RX ADMIN — METOPROLOL SUCCINATE 25 MG: 25 TABLET, EXTENDED RELEASE ORAL at 09:27

## 2024-01-29 ASSESSMENT — PAIN DESCRIPTION - ORIENTATION
ORIENTATION: RIGHT;LEFT

## 2024-01-29 ASSESSMENT — PAIN SCALES - GENERAL
PAINLEVEL_OUTOF10: 10
PAINLEVEL_OUTOF10: 10
PAINLEVEL_OUTOF10: 8

## 2024-01-29 ASSESSMENT — PAIN DESCRIPTION - DESCRIPTORS
DESCRIPTORS: CRAMPING

## 2024-01-29 ASSESSMENT — PAIN DESCRIPTION - LOCATION
LOCATION: HAND

## 2024-01-29 NOTE — ED TRIAGE NOTES
Pt brought in via EMS due to increase SOB throughout the day. Pt was discharged recently from [facility] due to a GI bleed. At this time pt is A&OX4. Appears tachypnic and tachycardic. EKG performed and shown to MD. Placed on complete monitor.     Received duo neb en route per EMS

## 2024-01-29 NOTE — ED PROVIDER NOTES
[facility] EMERGENCY DEPT  eMERGENCY dEPARTMENT eNCOUnter        Pt Name: Christy Donahue  MRN: [id number]  Birthdate 1950  Date of evaluation: 1/28/2024  Provider: Ari Fisher MD  PCP: Kalli Louise APRN - NP  Note Started: 10:23 PM EST 1/29/2024          CHIEF COMPLAINT       Chief Complaint   Patient presents with    Shortness of Breath       HISTORY OF PRESENT ILLNESS        Patient chronically ill with numerous comorbid conditions including congestive heart failure, COPD, coronary artery disease, pulmonary hypertension and others.  Coming in with shortness of breath which has been getting worse since earlier this morning.  No fevers.  No chest pain.  No hemoptysis or pleurisy.  States she has used her nebulizer treatments at home without relief.  No abdominal pain, vomiting or diarrhea.  Recently admitted to an outside hospital for acute on chronic congestive heart failure.    Nursing Notes were all reviewed and agreed with or any disagreements were addressed  in the HPI.    REVIEW OF SYSTEMS         The following 10 systems are reviewed and negative except as noted in the HPI: Constitutional, Eyes, ENT, cardiovascular, pulmonary, GI, , neuro, skin, and musculoskeletal       PASTMEDICAL HISTORY     Past Medical History:   Diagnosis Date    Anemia     Chondromalacia of both patellae     Chronic heart failure with preserved ejection fraction ([facility]) 01/13/2023    Chronic kidney disease     COPD (chronic obstructive pulmonary disease) ([facility]) 09/2015    mild-mod dz; Dr DEBORAH Yepez    Diabetes ([facility]) 2013    Dilated cardiomyopathy ([facility])     Dyslipidemia     Gout     Heart attack ([facility]) 10/18/2019    Stented    Hypercholesteremia 01/08/2014    Hypertension 2000    Mild protein-calorie malnutrition ([facility]) 8/7/2023    Obesity     Orthostatic hypotension 05/17/2016    Osteoarthritis of both knees     Popliteal cyst     Sleep apnea     not using cpap    Stage 3b chronic kidney disease ([facility]) 08/08/2022

## 2024-01-29 NOTE — ED NOTES
When attempting to change pt, pt became tachypneic into the 40s and tachycardic into 120s. Pt had increase in audible wheezing and became very restless. Pt recovered after being sat up at 90 degrees and rest

## 2024-01-29 NOTE — H&P
History & Physical    Patient: Christy Donahue MRN: [id number]  [facility]: [id number]    YOB: 1950  Age: 73 y.o.  Sex: female             DOA: 1/28/2024    Chief Complaint:   Chief Complaint   Patient presents with    Shortness of Breath          HPI:    Christy Donahue is a 73 y.o.  female with past medical history of COPD, CKD stage III, hypertension, diabetes mellitus type 2, chronic systolic heart failure and obesity comes to the emergency room complaining of shortness of breath.  Per patient she was recently admitted hospital was discharged last week.  After coming home she continues to have shortness of breath on exertion.  Last couple of days she has been having shortness of breath even at rest and also lying flat in the bed.  She has been compliant with her medication and also diet and fluid restriction.  She denies any fevers, no chills or rigors.  No chest pain or chest tightness.    In the emergency room patient noted to have acute CHF exacerbation, was given IV Bumex she put out close to 700 mL of urine.  Given after diuresis patient continued to be short of breath even with minimal exertion.    Past Medical History:   Diagnosis Date    Anemia     Chondromalacia of both patellae     Chronic heart failure with preserved ejection fraction ([facility]) 01/13/2023    Chronic kidney disease     COPD (chronic obstructive pulmonary disease) ([facility]) 09/2015    mild-mod dz; Dr DEBORAH Yepez    Diabetes ([facility]) 2013    Dilated cardiomyopathy ([facility])     Dyslipidemia     Gout     Heart attack ([facility]) 10/18/2019    Stented    Hypercholesteremia 01/08/2014    Hypertension 2000    Mild protein-calorie malnutrition ([facility]) 8/7/2023    Obesity     Orthostatic hypotension 05/17/2016    Osteoarthritis of both knees     Popliteal cyst     Sleep apnea     not using cpap    Stage 3b chronic kidney disease ([facility]) 08/08/2022    Vitamin D deficiency 10/16/2014       Past

## 2024-01-30 PROBLEM — D53.9 ANEMIA, MACROCYTIC: Status: ACTIVE | Noted: 2023-12-23

## 2024-01-30 LAB
ANION GAP SERPL CALC-SCNC: 3 MMOL/L (ref 3–18)
BASOPHILS # BLD: 0 K/UL (ref 0–0.1)
BASOPHILS NFR BLD: 0 % (ref 0–2)
BUN SERPL-MCNC: 21 MG/DL (ref 7–18)
BUN/CREAT SERPL: 10 (ref 12–20)
CALCIUM SERPL-MCNC: 8.6 MG/DL (ref 8.5–10.1)
CHLORIDE SERPL-SCNC: 112 MMOL/L (ref 100–111)
CO2 SERPL-SCNC: 25 MMOL/L (ref 21–32)
CREAT SERPL-MCNC: 2.15 MG/DL (ref 0.6–1.3)
DIFFERENTIAL METHOD BLD: ABNORMAL
EOSINOPHIL # BLD: 0 K/UL (ref 0–0.4)
EOSINOPHIL NFR BLD: 0 % (ref 0–5)
ERYTHROCYTE [DISTWIDTH] IN BLOOD BY AUTOMATED COUNT: 15.8 % (ref 11.6–14.5)
EST. AVERAGE GLUCOSE BLD GHB EST-MCNC: 97 MG/DL
FOLATE SERPL-MCNC: 6.2 NG/ML (ref 3.1–17.5)
GLUCOSE BLD STRIP.AUTO-MCNC: 115 MG/DL (ref 70–110)
GLUCOSE BLD STRIP.AUTO-MCNC: 120 MG/DL (ref 70–110)
GLUCOSE BLD STRIP.AUTO-MCNC: 98 MG/DL (ref 70–110)
GLUCOSE BLD STRIP.AUTO-MCNC: 99 MG/DL (ref 70–110)
GLUCOSE SERPL-MCNC: 113 MG/DL (ref 74–99)
HBA1C MFR BLD: 5 % (ref 4.2–5.6)
HCT VFR BLD AUTO: 25.4 % (ref 35–45)
HGB BLD-MCNC: 7.6 G/DL (ref 12–16)
IMM GRANULOCYTES # BLD AUTO: 0 K/UL (ref 0–0.04)
IMM GRANULOCYTES NFR BLD AUTO: 0 % (ref 0–0.5)
LYMPHOCYTES # BLD: 0.7 K/UL (ref 0.9–3.6)
LYMPHOCYTES NFR BLD: 14 % (ref 21–52)
MAGNESIUM SERPL-MCNC: 2.1 MG/DL (ref 1.6–2.6)
MCH RBC QN AUTO: 29.5 PG (ref 24–34)
MCHC RBC AUTO-ENTMCNC: 29.9 G/DL (ref 31–37)
MCV RBC AUTO: 98.4 FL (ref 78–100)
MONOCYTES # BLD: 0.5 K/UL (ref 0.05–1.2)
MONOCYTES NFR BLD: 9 % (ref 3–10)
NEUTS SEG # BLD: 4.1 K/UL (ref 1.8–8)
NEUTS SEG NFR BLD: 77 % (ref 40–73)
NRBC # BLD: 0.04 K/UL (ref 0–0.01)
NRBC BLD-RTO: 0.8 PER 100 WBC
NT PRO BNP: 9373 PG/ML (ref 0–900)
PHOSPHATE SERPL-MCNC: 4 MG/DL (ref 2.5–4.9)
PLATELET # BLD AUTO: 387 K/UL (ref 135–420)
PMV BLD AUTO: 9.8 FL (ref 9.2–11.8)
POTASSIUM SERPL-SCNC: 5.8 MMOL/L (ref 3.5–5.5)
RBC # BLD AUTO: 2.58 M/UL (ref 4.2–5.3)
SODIUM SERPL-SCNC: 140 MMOL/L (ref 136–145)
VIT B12 SERPL-MCNC: 279 PG/ML (ref 211–911)
WBC # BLD AUTO: 5.3 K/UL (ref 4.6–13.2)

## 2024-01-30 PROCEDURE — 6370000000 HC RX 637 (ALT 250 FOR IP): Performed by: HOSPITALIST

## 2024-01-30 PROCEDURE — 94669 MECHANICAL CHEST WALL OSCILL: CPT

## 2024-01-30 PROCEDURE — 2580000003 HC RX 258: Performed by: HOSPITALIST

## 2024-01-30 PROCEDURE — 82962 GLUCOSE BLOOD TEST: CPT

## 2024-01-30 PROCEDURE — 97166 OT EVAL MOD COMPLEX 45 MIN: CPT

## 2024-01-30 PROCEDURE — 99232 SBSQ HOSP IP/OBS MODERATE 35: CPT | Performed by: INTERNAL MEDICINE

## 2024-01-30 PROCEDURE — 83735 ASSAY OF MAGNESIUM: CPT

## 2024-01-30 PROCEDURE — 36415 COLL VENOUS BLD VENIPUNCTURE: CPT

## 2024-01-30 PROCEDURE — 80048 BASIC METABOLIC PNL TOTAL CA: CPT

## 2024-01-30 PROCEDURE — 94761 N-INVAS EAR/PLS OXIMETRY MLT: CPT

## 2024-01-30 PROCEDURE — 6360000002 HC RX W HCPCS: Performed by: INTERNAL MEDICINE

## 2024-01-30 PROCEDURE — 84100 ASSAY OF PHOSPHORUS: CPT

## 2024-01-30 PROCEDURE — 83036 HEMOGLOBIN GLYCOSYLATED A1C: CPT

## 2024-01-30 PROCEDURE — 85025 COMPLETE CBC W/AUTO DIFF WBC: CPT

## 2024-01-30 PROCEDURE — 2700000000 HC OXYGEN THERAPY PER DAY

## 2024-01-30 PROCEDURE — 6360000002 HC RX W HCPCS: Performed by: HOSPITALIST

## 2024-01-30 PROCEDURE — 97116 GAIT TRAINING THERAPY: CPT

## 2024-01-30 PROCEDURE — 6370000000 HC RX 637 (ALT 250 FOR IP): Performed by: INTERNAL MEDICINE

## 2024-01-30 PROCEDURE — 99233 SBSQ HOSP IP/OBS HIGH 50: CPT | Performed by: INTERNAL MEDICINE

## 2024-01-30 PROCEDURE — 83880 ASSAY OF NATRIURETIC PEPTIDE: CPT

## 2024-01-30 PROCEDURE — 99232 SBSQ HOSP IP/OBS MODERATE 35: CPT | Performed by: HOSPITALIST

## 2024-01-30 PROCEDURE — 1100000003 HC PRIVATE W/ TELEMETRY

## 2024-01-30 PROCEDURE — 82607 VITAMIN B-12: CPT

## 2024-01-30 PROCEDURE — 94640 AIRWAY INHALATION TREATMENT: CPT

## 2024-01-30 PROCEDURE — 82746 ASSAY OF FOLIC ACID SERUM: CPT

## 2024-01-30 PROCEDURE — 97162 PT EVAL MOD COMPLEX 30 MIN: CPT

## 2024-01-30 RX ORDER — FOLIC ACID 1 MG/1
1 TABLET ORAL DAILY
Status: DISCONTINUED | OUTPATIENT
Start: 2024-01-30 | End: 2024-02-06 | Stop reason: HOSPADM

## 2024-01-30 RX ORDER — BUMETANIDE 1 MG/1
1 TABLET ORAL DAILY
Status: DISCONTINUED | OUTPATIENT
Start: 2024-01-30 | End: 2024-02-01

## 2024-01-30 RX ORDER — GUAIFENESIN 600 MG/1
600 TABLET, EXTENDED RELEASE ORAL 2 TIMES DAILY
Status: DISCONTINUED | OUTPATIENT
Start: 2024-01-30 | End: 2024-02-06 | Stop reason: HOSPADM

## 2024-01-30 RX ORDER — LANOLIN ALCOHOL/MO/W.PET/CERES
1000 CREAM (GRAM) TOPICAL DAILY
Status: DISCONTINUED | OUTPATIENT
Start: 2024-01-30 | End: 2024-02-06 | Stop reason: HOSPADM

## 2024-01-30 RX ORDER — TRAMADOL HYDROCHLORIDE 50 MG/1
25 TABLET ORAL EVERY 8 HOURS PRN
Status: DISCONTINUED | OUTPATIENT
Start: 2024-01-30 | End: 2024-02-06 | Stop reason: HOSPADM

## 2024-01-30 RX ADMIN — GABAPENTIN 400 MG: 400 CAPSULE ORAL at 17:43

## 2024-01-30 RX ADMIN — GUAIFENESIN 600 MG: 600 TABLET, EXTENDED RELEASE ORAL at 21:36

## 2024-01-30 RX ADMIN — BUMETANIDE 1 MG: 1 TABLET ORAL at 10:09

## 2024-01-30 RX ADMIN — ARFORMOTEROL TARTRATE 15 MCG: 15 SOLUTION RESPIRATORY (INHALATION) at 21:02

## 2024-01-30 RX ADMIN — IPRATROPIUM BROMIDE 0.5 MG: 0.5 SOLUTION RESPIRATORY (INHALATION) at 14:16

## 2024-01-30 RX ADMIN — CYANOCOBALAMIN TAB 1000 MCG 1000 MCG: 1000 TAB at 17:42

## 2024-01-30 RX ADMIN — APIXABAN 5 MG: 5 TABLET, FILM COATED ORAL at 21:35

## 2024-01-30 RX ADMIN — IPRATROPIUM BROMIDE 0.5 MG: 0.5 SOLUTION RESPIRATORY (INHALATION) at 08:47

## 2024-01-30 RX ADMIN — FAMOTIDINE 20 MG: 20 TABLET ORAL at 08:46

## 2024-01-30 RX ADMIN — GUAIFENESIN 600 MG: 600 TABLET, EXTENDED RELEASE ORAL at 14:16

## 2024-01-30 RX ADMIN — FOLIC ACID 1 MG: 1 TABLET ORAL at 17:42

## 2024-01-30 RX ADMIN — SODIUM CHLORIDE, PRESERVATIVE FREE 10 ML: 5 INJECTION INTRAVENOUS at 08:47

## 2024-01-30 RX ADMIN — BUMETANIDE 1 MG: 0.25 INJECTION INTRAMUSCULAR; INTRAVENOUS at 08:46

## 2024-01-30 RX ADMIN — TRAMADOL HYDROCHLORIDE 25 MG: 50 TABLET ORAL at 14:30

## 2024-01-30 RX ADMIN — SODIUM ZIRCONIUM CYCLOSILICATE 5 G: 5 POWDER, FOR SUSPENSION ORAL at 21:38

## 2024-01-30 RX ADMIN — ARFORMOTEROL TARTRATE 15 MCG: 15 SOLUTION RESPIRATORY (INHALATION) at 08:47

## 2024-01-30 RX ADMIN — Medication 400 MG: at 08:46

## 2024-01-30 RX ADMIN — ASPIRIN 81 MG: 81 TABLET, CHEWABLE ORAL at 08:46

## 2024-01-30 RX ADMIN — METOPROLOL SUCCINATE 25 MG: 25 TABLET, EXTENDED RELEASE ORAL at 08:46

## 2024-01-30 RX ADMIN — SODIUM ZIRCONIUM CYCLOSILICATE 5 G: 5 POWDER, FOR SUSPENSION ORAL at 14:16

## 2024-01-30 RX ADMIN — SODIUM CHLORIDE, PRESERVATIVE FREE 10 ML: 5 INJECTION INTRAVENOUS at 21:37

## 2024-01-30 RX ADMIN — ATORVASTATIN CALCIUM 40 MG: 40 TABLET, FILM COATED ORAL at 21:35

## 2024-01-30 RX ADMIN — APIXABAN 5 MG: 5 TABLET, FILM COATED ORAL at 08:46

## 2024-01-30 RX ADMIN — IPRATROPIUM BROMIDE 0.5 MG: 0.5 SOLUTION RESPIRATORY (INHALATION) at 20:50

## 2024-01-30 RX ADMIN — TRAMADOL HYDROCHLORIDE 25 MG: 50 TABLET ORAL at 21:42

## 2024-01-30 RX ADMIN — INSULIN GLARGINE 12 UNITS: 100 INJECTION, SOLUTION SUBCUTANEOUS at 08:45

## 2024-01-30 ASSESSMENT — PAIN DESCRIPTION - DESCRIPTORS
DESCRIPTORS: ACHING
DESCRIPTORS: ACHING

## 2024-01-30 ASSESSMENT — PAIN SCALES - GENERAL
PAINLEVEL_OUTOF10: 8
PAINLEVEL_OUTOF10: 10
PAINLEVEL_OUTOF10: 6
PAINLEVEL_OUTOF10: 0

## 2024-01-30 ASSESSMENT — PAIN DESCRIPTION - LOCATION: LOCATION: KNEE;FINGER (COMMENT WHICH ONE)

## 2024-01-30 ASSESSMENT — PAIN DESCRIPTION - ORIENTATION
ORIENTATION: OTHER (COMMENT)
ORIENTATION: RIGHT;LEFT

## 2024-01-30 NOTE — CARE COORDINATION
Case Management Assessment  Initial Evaluation    Date/Time of Evaluation: 1/30/2024 5:08 PM  Assessment Completed by: Christa Wilkes RN    If patient is discharged prior to next notation, then this note serves as note for discharge by case management.    Patient Name: Christy Donahue                   YOB: 1950  Diagnosis: Exertional dyspnea [R06.09]  Acute on chronic combined systolic and diastolic CHF (congestive heart failure) (HCC) [I50.43]  CHF (congestive heart failure), NYHA class I, acute on chronic, combined (HCC) [I50.43]  Acute on chronic congestive heart failure, unspecified heart failure type (HCC) [I50.9]                   Date / Time: 1/28/2024  9:56 PM    Patient Admission Status: Inpatient   Readmission Risk (Low < 19, Mod (19-27), High > 27): Readmission Risk Score: 34.4    Current PCP: Kalli Louise APRN - NP  PCP verified by CM? (P) Yes    Chart Reviewed: Yes      History Provided by: (P) Patient  Patient Orientation: (P) Alert and Oriented    Patient Cognition: (P) Alert    Hospitalization in the last 30 days (Readmission):  No    If yes, Readmission Assessment in CM Navigator will be completed.    Advance Directives:      Code Status: Full Code   Patient's Primary Decision Maker is: (P) Named in Scanned ACP Document    Primary Decision Maker: Lance Donahue - Spouse - [phone]    Discharge Planning:    Patient lives with: (P) Spouse/Significant Other Type of Home: (P) House  Primary Care Giver: (P) Self  Patient Support Systems include: (P) Spouse/Significant Other   Current Financial resources: (P) Medicaid, Medicare  Current community resources:    Current services prior to admission: (P) None            Current DME:              Type of Home Care services:  Housekeeping    ADLS  Prior functional level: (P) Independent in ADLs/IADLs  Current functional level: (P) Independent in ADLs/IADLs    PT AM-PAC: 18 /24  OT AM-PAC: 23 /24    Family can provide assistance at

## 2024-01-31 ENCOUNTER — APPOINTMENT (OUTPATIENT)
Facility: HOSPITAL | Age: 74
DRG: 291 | End: 2024-01-31
Payer: MEDICARE

## 2024-01-31 PROBLEM — M79.672 LEFT FOOT PAIN: Status: ACTIVE | Noted: 2024-01-31

## 2024-01-31 LAB
ANION GAP SERPL CALC-SCNC: 3 MMOL/L (ref 3–18)
BASOPHILS # BLD: 0 K/UL (ref 0–0.1)
BASOPHILS NFR BLD: 0 % (ref 0–2)
BUN SERPL-MCNC: 29 MG/DL (ref 7–18)
BUN/CREAT SERPL: 13 (ref 12–20)
CALCIUM SERPL-MCNC: 8.4 MG/DL (ref 8.5–10.1)
CHLORIDE SERPL-SCNC: 112 MMOL/L (ref 100–111)
CO2 SERPL-SCNC: 24 MMOL/L (ref 21–32)
CREAT SERPL-MCNC: 2.2 MG/DL (ref 0.6–1.3)
DIFFERENTIAL METHOD BLD: ABNORMAL
EOSINOPHIL # BLD: 0.2 K/UL (ref 0–0.4)
EOSINOPHIL NFR BLD: 3 % (ref 0–5)
ERYTHROCYTE [DISTWIDTH] IN BLOOD BY AUTOMATED COUNT: 15.8 % (ref 11.6–14.5)
GLUCOSE BLD STRIP.AUTO-MCNC: 95 MG/DL (ref 70–110)
GLUCOSE BLD STRIP.AUTO-MCNC: 96 MG/DL (ref 70–110)
GLUCOSE BLD STRIP.AUTO-MCNC: 97 MG/DL (ref 70–110)
GLUCOSE SERPL-MCNC: 84 MG/DL (ref 74–99)
HCT VFR BLD AUTO: 23.7 % (ref 35–45)
HGB BLD-MCNC: 7.3 G/DL (ref 12–16)
IMM GRANULOCYTES # BLD AUTO: 0 K/UL (ref 0–0.04)
IMM GRANULOCYTES NFR BLD AUTO: 0 % (ref 0–0.5)
LYMPHOCYTES # BLD: 1.9 K/UL (ref 0.9–3.6)
LYMPHOCYTES NFR BLD: 28 % (ref 21–52)
MAGNESIUM SERPL-MCNC: 1.9 MG/DL (ref 1.6–2.6)
MCH RBC QN AUTO: 30 PG (ref 24–34)
MCHC RBC AUTO-ENTMCNC: 30.8 G/DL (ref 31–37)
MCV RBC AUTO: 97.5 FL (ref 78–100)
MONOCYTES # BLD: 0.6 K/UL (ref 0.05–1.2)
MONOCYTES NFR BLD: 9 % (ref 3–10)
NEUTS SEG # BLD: 4.1 K/UL (ref 1.8–8)
NEUTS SEG NFR BLD: 60 % (ref 40–73)
NRBC # BLD: 0.04 K/UL (ref 0–0.01)
NRBC BLD-RTO: 0.6 PER 100 WBC
PLATELET # BLD AUTO: 348 K/UL (ref 135–420)
PMV BLD AUTO: 9.5 FL (ref 9.2–11.8)
POTASSIUM SERPL-SCNC: 4.8 MMOL/L (ref 3.5–5.5)
RBC # BLD AUTO: 2.43 M/UL (ref 4.2–5.3)
SODIUM SERPL-SCNC: 139 MMOL/L (ref 136–145)
WBC # BLD AUTO: 6.8 K/UL (ref 4.6–13.2)

## 2024-01-31 PROCEDURE — 94667 MNPJ CHEST WALL 1ST: CPT

## 2024-01-31 PROCEDURE — 2580000003 HC RX 258: Performed by: HOSPITALIST

## 2024-01-31 PROCEDURE — 94761 N-INVAS EAR/PLS OXIMETRY MLT: CPT

## 2024-01-31 PROCEDURE — 83735 ASSAY OF MAGNESIUM: CPT

## 2024-01-31 PROCEDURE — 85025 COMPLETE CBC W/AUTO DIFF WBC: CPT

## 2024-01-31 PROCEDURE — 99232 SBSQ HOSP IP/OBS MODERATE 35: CPT | Performed by: INTERNAL MEDICINE

## 2024-01-31 PROCEDURE — 6370000000 HC RX 637 (ALT 250 FOR IP): Performed by: INTERNAL MEDICINE

## 2024-01-31 PROCEDURE — 6360000002 HC RX W HCPCS: Performed by: INTERNAL MEDICINE

## 2024-01-31 PROCEDURE — 36415 COLL VENOUS BLD VENIPUNCTURE: CPT

## 2024-01-31 PROCEDURE — 82962 GLUCOSE BLOOD TEST: CPT

## 2024-01-31 PROCEDURE — 6370000000 HC RX 637 (ALT 250 FOR IP): Performed by: HOSPITALIST

## 2024-01-31 PROCEDURE — 73620 X-RAY EXAM OF FOOT: CPT

## 2024-01-31 PROCEDURE — 94669 MECHANICAL CHEST WALL OSCILL: CPT

## 2024-01-31 PROCEDURE — 2700000000 HC OXYGEN THERAPY PER DAY

## 2024-01-31 PROCEDURE — 99233 SBSQ HOSP IP/OBS HIGH 50: CPT | Performed by: INTERNAL MEDICINE

## 2024-01-31 PROCEDURE — 99232 SBSQ HOSP IP/OBS MODERATE 35: CPT | Performed by: HOSPITALIST

## 2024-01-31 PROCEDURE — 6360000002 HC RX W HCPCS: Performed by: PHYSICIAN ASSISTANT

## 2024-01-31 PROCEDURE — 80048 BASIC METABOLIC PNL TOTAL CA: CPT

## 2024-01-31 PROCEDURE — 94640 AIRWAY INHALATION TREATMENT: CPT

## 2024-01-31 PROCEDURE — 1100000003 HC PRIVATE W/ TELEMETRY

## 2024-01-31 RX ORDER — POLYETHYLENE GLYCOL 3350 17 G/17G
17 POWDER, FOR SOLUTION ORAL DAILY
Status: DISCONTINUED | OUTPATIENT
Start: 2024-01-31 | End: 2024-02-06 | Stop reason: HOSPADM

## 2024-01-31 RX ORDER — OXYCODONE HYDROCHLORIDE AND ACETAMINOPHEN 5; 325 MG/1; MG/1
1 TABLET ORAL EVERY 8 HOURS PRN
Status: COMPLETED | OUTPATIENT
Start: 2024-01-31 | End: 2024-02-01

## 2024-01-31 RX ORDER — FUROSEMIDE 10 MG/ML
40 INJECTION INTRAMUSCULAR; INTRAVENOUS ONCE
Status: COMPLETED | OUTPATIENT
Start: 2024-01-31 | End: 2024-01-31

## 2024-01-31 RX ADMIN — SODIUM ZIRCONIUM CYCLOSILICATE 5 G: 5 POWDER, FOR SUSPENSION ORAL at 08:58

## 2024-01-31 RX ADMIN — POLYETHYLENE GLYCOL 3350 17 G: 17 POWDER, FOR SOLUTION ORAL at 23:08

## 2024-01-31 RX ADMIN — CYANOCOBALAMIN TAB 1000 MCG 1000 MCG: 1000 TAB at 08:58

## 2024-01-31 RX ADMIN — FUROSEMIDE 40 MG: 10 INJECTION, SOLUTION INTRAMUSCULAR; INTRAVENOUS at 12:18

## 2024-01-31 RX ADMIN — Medication 400 MG: at 08:58

## 2024-01-31 RX ADMIN — IPRATROPIUM BROMIDE 0.5 MG: 0.5 SOLUTION RESPIRATORY (INHALATION) at 14:12

## 2024-01-31 RX ADMIN — APIXABAN 5 MG: 5 TABLET, FILM COATED ORAL at 23:06

## 2024-01-31 RX ADMIN — OXYCODONE HYDROCHLORIDE AND ACETAMINOPHEN 1 TABLET: 5; 325 TABLET ORAL at 19:26

## 2024-01-31 RX ADMIN — IPRATROPIUM BROMIDE 0.5 MG: 0.5 SOLUTION RESPIRATORY (INHALATION) at 07:57

## 2024-01-31 RX ADMIN — FOLIC ACID 1 MG: 1 TABLET ORAL at 08:58

## 2024-01-31 RX ADMIN — SODIUM CHLORIDE, PRESERVATIVE FREE 10 ML: 5 INJECTION INTRAVENOUS at 08:59

## 2024-01-31 RX ADMIN — FAMOTIDINE 20 MG: 20 TABLET ORAL at 08:58

## 2024-01-31 RX ADMIN — GUAIFENESIN 600 MG: 600 TABLET, EXTENDED RELEASE ORAL at 23:06

## 2024-01-31 RX ADMIN — SODIUM ZIRCONIUM CYCLOSILICATE 5 G: 5 POWDER, FOR SUSPENSION ORAL at 14:12

## 2024-01-31 RX ADMIN — ARFORMOTEROL TARTRATE 15 MCG: 15 SOLUTION RESPIRATORY (INHALATION) at 19:29

## 2024-01-31 RX ADMIN — METOPROLOL SUCCINATE 25 MG: 25 TABLET, EXTENDED RELEASE ORAL at 08:58

## 2024-01-31 RX ADMIN — APIXABAN 5 MG: 5 TABLET, FILM COATED ORAL at 08:58

## 2024-01-31 RX ADMIN — ARFORMOTEROL TARTRATE 15 MCG: 15 SOLUTION RESPIRATORY (INHALATION) at 07:57

## 2024-01-31 RX ADMIN — INSULIN GLARGINE 12 UNITS: 100 INJECTION, SOLUTION SUBCUTANEOUS at 08:57

## 2024-01-31 RX ADMIN — TRAMADOL HYDROCHLORIDE 25 MG: 50 TABLET ORAL at 17:51

## 2024-01-31 RX ADMIN — GABAPENTIN 400 MG: 400 CAPSULE ORAL at 17:51

## 2024-01-31 RX ADMIN — ASPIRIN 81 MG: 81 TABLET, CHEWABLE ORAL at 08:58

## 2024-01-31 RX ADMIN — GUAIFENESIN 600 MG: 600 TABLET, EXTENDED RELEASE ORAL at 08:58

## 2024-01-31 RX ADMIN — IPRATROPIUM BROMIDE AND ALBUTEROL SULFATE 1 DOSE: .5; 3 SOLUTION RESPIRATORY (INHALATION) at 19:21

## 2024-01-31 RX ADMIN — SODIUM ZIRCONIUM CYCLOSILICATE 5 G: 5 POWDER, FOR SUSPENSION ORAL at 23:07

## 2024-01-31 RX ADMIN — ATORVASTATIN CALCIUM 40 MG: 40 TABLET, FILM COATED ORAL at 23:06

## 2024-01-31 RX ADMIN — TRAMADOL HYDROCHLORIDE 25 MG: 50 TABLET ORAL at 09:23

## 2024-01-31 RX ADMIN — SODIUM CHLORIDE, PRESERVATIVE FREE 10 ML: 5 INJECTION INTRAVENOUS at 23:06

## 2024-01-31 RX ADMIN — BUMETANIDE 1 MG: 1 TABLET ORAL at 08:58

## 2024-01-31 ASSESSMENT — PAIN DESCRIPTION - DESCRIPTORS
DESCRIPTORS: ACHING

## 2024-01-31 ASSESSMENT — PAIN DESCRIPTION - ORIENTATION
ORIENTATION: RIGHT;LEFT
ORIENTATION: RIGHT;LEFT
ORIENTATION: LEFT

## 2024-01-31 ASSESSMENT — PAIN SCALES - GENERAL
PAINLEVEL_OUTOF10: 10
PAINLEVEL_OUTOF10: 0
PAINLEVEL_OUTOF10: 10
PAINLEVEL_OUTOF10: 0
PAINLEVEL_OUTOF10: 10

## 2024-01-31 ASSESSMENT — PAIN SCALES - WONG BAKER
WONGBAKER_NUMERICALRESPONSE: 0
WONGBAKER_NUMERICALRESPONSE: 0

## 2024-01-31 ASSESSMENT — PAIN DESCRIPTION - LOCATION
LOCATION: FOOT
LOCATION: FOOT

## 2024-02-01 ENCOUNTER — APPOINTMENT (OUTPATIENT)
Facility: HOSPITAL | Age: 74
DRG: 291 | End: 2024-02-01
Payer: MEDICARE

## 2024-02-01 PROBLEM — I34.0 MODERATE MITRAL VALVE REGURGITATION: Status: ACTIVE | Noted: 2020-08-13

## 2024-02-01 PROBLEM — I42.9 CARDIOMYOPATHY (HCC): Status: ACTIVE | Noted: 2024-01-29

## 2024-02-01 LAB
ANION GAP SERPL CALC-SCNC: 7 MMOL/L (ref 3–18)
BUN SERPL-MCNC: 35 MG/DL (ref 7–18)
BUN/CREAT SERPL: 15 (ref 12–20)
CALCIUM SERPL-MCNC: 8.5 MG/DL (ref 8.5–10.1)
CHLORIDE SERPL-SCNC: 107 MMOL/L (ref 100–111)
CO2 SERPL-SCNC: 25 MMOL/L (ref 21–32)
CREAT SERPL-MCNC: 2.29 MG/DL (ref 0.6–1.3)
FERRITIN SERPL-MCNC: 216 NG/ML (ref 8–388)
GLUCOSE BLD STRIP.AUTO-MCNC: 105 MG/DL (ref 70–110)
GLUCOSE BLD STRIP.AUTO-MCNC: 136 MG/DL (ref 70–110)
GLUCOSE BLD STRIP.AUTO-MCNC: 201 MG/DL (ref 70–110)
GLUCOSE BLD STRIP.AUTO-MCNC: 271 MG/DL (ref 70–110)
GLUCOSE SERPL-MCNC: 150 MG/DL (ref 74–99)
IRON SATN MFR SERPL: 16 % (ref 20–50)
IRON SERPL-MCNC: 40 UG/DL (ref 50–175)
PHOSPHATE SERPL-MCNC: 4.8 MG/DL (ref 2.5–4.9)
POTASSIUM SERPL-SCNC: 4.2 MMOL/L (ref 3.5–5.5)
SODIUM SERPL-SCNC: 139 MMOL/L (ref 136–145)
TIBC SERPL-MCNC: 256 UG/DL (ref 250–450)
URATE SERPL-MCNC: 12.1 MG/DL (ref 2.6–7.2)

## 2024-02-01 PROCEDURE — 80048 BASIC METABOLIC PNL TOTAL CA: CPT

## 2024-02-01 PROCEDURE — 82962 GLUCOSE BLOOD TEST: CPT

## 2024-02-01 PROCEDURE — 6360000002 HC RX W HCPCS: Performed by: PHYSICIAN ASSISTANT

## 2024-02-01 PROCEDURE — APPSS15 APP SPLIT SHARED TIME 0-15 MINUTES

## 2024-02-01 PROCEDURE — 99233 SBSQ HOSP IP/OBS HIGH 50: CPT | Performed by: INTERNAL MEDICINE

## 2024-02-01 PROCEDURE — 73590 X-RAY EXAM OF LOWER LEG: CPT

## 2024-02-01 PROCEDURE — 82728 ASSAY OF FERRITIN: CPT

## 2024-02-01 PROCEDURE — 83540 ASSAY OF IRON: CPT

## 2024-02-01 PROCEDURE — 2700000000 HC OXYGEN THERAPY PER DAY

## 2024-02-01 PROCEDURE — 6360000002 HC RX W HCPCS: Performed by: INTERNAL MEDICINE

## 2024-02-01 PROCEDURE — 83550 IRON BINDING TEST: CPT

## 2024-02-01 PROCEDURE — 36415 COLL VENOUS BLD VENIPUNCTURE: CPT

## 2024-02-01 PROCEDURE — 99232 SBSQ HOSP IP/OBS MODERATE 35: CPT | Performed by: HOSPITALIST

## 2024-02-01 PROCEDURE — 6370000000 HC RX 637 (ALT 250 FOR IP): Performed by: HOSPITALIST

## 2024-02-01 PROCEDURE — 99232 SBSQ HOSP IP/OBS MODERATE 35: CPT | Performed by: INTERNAL MEDICINE

## 2024-02-01 PROCEDURE — 84550 ASSAY OF BLOOD/URIC ACID: CPT

## 2024-02-01 PROCEDURE — 94761 N-INVAS EAR/PLS OXIMETRY MLT: CPT

## 2024-02-01 PROCEDURE — 94640 AIRWAY INHALATION TREATMENT: CPT

## 2024-02-01 PROCEDURE — 2580000003 HC RX 258: Performed by: HOSPITALIST

## 2024-02-01 PROCEDURE — 1100000003 HC PRIVATE W/ TELEMETRY

## 2024-02-01 PROCEDURE — 97110 THERAPEUTIC EXERCISES: CPT

## 2024-02-01 PROCEDURE — 84100 ASSAY OF PHOSPHORUS: CPT

## 2024-02-01 RX ORDER — BUMETANIDE 0.25 MG/ML
1 INJECTION INTRAMUSCULAR; INTRAVENOUS EVERY 12 HOURS
Status: DISCONTINUED | OUTPATIENT
Start: 2024-02-01 | End: 2024-02-01

## 2024-02-01 RX ORDER — PREDNISONE 20 MG/1
40 TABLET ORAL DAILY
Status: DISCONTINUED | OUTPATIENT
Start: 2024-02-01 | End: 2024-02-02

## 2024-02-01 RX ORDER — BUMETANIDE 0.25 MG/ML
1 INJECTION INTRAMUSCULAR; INTRAVENOUS EVERY 12 HOURS
Status: DISCONTINUED | OUTPATIENT
Start: 2024-02-02 | End: 2024-02-02

## 2024-02-01 RX ORDER — FUROSEMIDE 10 MG/ML
40 INJECTION INTRAMUSCULAR; INTRAVENOUS DAILY
Status: DISCONTINUED | OUTPATIENT
Start: 2024-02-01 | End: 2024-02-01

## 2024-02-01 RX ADMIN — SODIUM CHLORIDE, PRESERVATIVE FREE 10 ML: 5 INJECTION INTRAVENOUS at 21:12

## 2024-02-01 RX ADMIN — IPRATROPIUM BROMIDE 0.5 MG: 0.5 SOLUTION RESPIRATORY (INHALATION) at 11:43

## 2024-02-01 RX ADMIN — GUAIFENESIN 600 MG: 600 TABLET, EXTENDED RELEASE ORAL at 21:05

## 2024-02-01 RX ADMIN — IPRATROPIUM BROMIDE 0.5 MG: 0.5 SOLUTION RESPIRATORY (INHALATION) at 20:32

## 2024-02-01 RX ADMIN — INSULIN LISPRO 1 UNITS: 100 INJECTION, SOLUTION INTRAVENOUS; SUBCUTANEOUS at 17:33

## 2024-02-01 RX ADMIN — APIXABAN 5 MG: 5 TABLET, FILM COATED ORAL at 08:36

## 2024-02-01 RX ADMIN — ATORVASTATIN CALCIUM 40 MG: 40 TABLET, FILM COATED ORAL at 21:05

## 2024-02-01 RX ADMIN — METOPROLOL SUCCINATE 25 MG: 25 TABLET, EXTENDED RELEASE ORAL at 08:36

## 2024-02-01 RX ADMIN — SODIUM CHLORIDE, PRESERVATIVE FREE 10 ML: 5 INJECTION INTRAVENOUS at 08:37

## 2024-02-01 RX ADMIN — OXYCODONE HYDROCHLORIDE AND ACETAMINOPHEN 1 TABLET: 5; 325 TABLET ORAL at 17:35

## 2024-02-01 RX ADMIN — FOLIC ACID 1 MG: 1 TABLET ORAL at 08:36

## 2024-02-01 RX ADMIN — CYANOCOBALAMIN TAB 1000 MCG 1000 MCG: 1000 TAB at 08:36

## 2024-02-01 RX ADMIN — FAMOTIDINE 20 MG: 20 TABLET ORAL at 08:36

## 2024-02-01 RX ADMIN — ASPIRIN 81 MG: 81 TABLET, CHEWABLE ORAL at 08:36

## 2024-02-01 RX ADMIN — OXYCODONE HYDROCHLORIDE AND ACETAMINOPHEN 1 TABLET: 5; 325 TABLET ORAL at 08:36

## 2024-02-01 RX ADMIN — TRAMADOL HYDROCHLORIDE 25 MG: 50 TABLET ORAL at 11:40

## 2024-02-01 RX ADMIN — APIXABAN 5 MG: 5 TABLET, FILM COATED ORAL at 21:05

## 2024-02-01 RX ADMIN — TRAMADOL HYDROCHLORIDE 25 MG: 50 TABLET ORAL at 21:04

## 2024-02-01 RX ADMIN — Medication 400 MG: at 08:36

## 2024-02-01 RX ADMIN — ARFORMOTEROL TARTRATE 15 MCG: 15 SOLUTION RESPIRATORY (INHALATION) at 11:42

## 2024-02-01 RX ADMIN — ARFORMOTEROL TARTRATE 15 MCG: 15 SOLUTION RESPIRATORY (INHALATION) at 20:32

## 2024-02-01 RX ADMIN — INSULIN GLARGINE 12 UNITS: 100 INJECTION, SOLUTION SUBCUTANEOUS at 08:36

## 2024-02-01 RX ADMIN — GUAIFENESIN 600 MG: 600 TABLET, EXTENDED RELEASE ORAL at 08:36

## 2024-02-01 RX ADMIN — PREDNISONE 40 MG: 20 TABLET ORAL at 09:32

## 2024-02-01 RX ADMIN — FUROSEMIDE 40 MG: 10 INJECTION, SOLUTION INTRAMUSCULAR; INTRAVENOUS at 09:32

## 2024-02-01 RX ADMIN — GABAPENTIN 400 MG: 400 CAPSULE ORAL at 17:33

## 2024-02-01 RX ADMIN — POLYETHYLENE GLYCOL 3350 17 G: 17 POWDER, FOR SOLUTION ORAL at 08:36

## 2024-02-01 RX ADMIN — SODIUM ZIRCONIUM CYCLOSILICATE 5 G: 5 POWDER, FOR SUSPENSION ORAL at 08:36

## 2024-02-01 ASSESSMENT — PAIN SCALES - GENERAL
PAINLEVEL_OUTOF10: 8
PAINLEVEL_OUTOF10: 10
PAINLEVEL_OUTOF10: 7
PAINLEVEL_OUTOF10: 0
PAINLEVEL_OUTOF10: 0
PAINLEVEL_OUTOF10: 5
PAINLEVEL_OUTOF10: 10
PAINLEVEL_OUTOF10: 6
PAINLEVEL_OUTOF10: 10
PAINLEVEL_OUTOF10: 0
PAINLEVEL_OUTOF10: 10
PAINLEVEL_OUTOF10: 0

## 2024-02-01 ASSESSMENT — PAIN DESCRIPTION - ORIENTATION: ORIENTATION: RIGHT;LEFT

## 2024-02-01 ASSESSMENT — PAIN SCALES - WONG BAKER
WONGBAKER_NUMERICALRESPONSE: 0

## 2024-02-01 ASSESSMENT — PAIN DESCRIPTION - LOCATION
LOCATION: FOOT

## 2024-02-01 ASSESSMENT — PAIN DESCRIPTION - DESCRIPTORS: DESCRIPTORS: SHOOTING;THROBBING;SHARP

## 2024-02-01 ASSESSMENT — PAIN - FUNCTIONAL ASSESSMENT: PAIN_FUNCTIONAL_ASSESSMENT: INTOLERABLE, UNABLE TO DO ANY ACTIVE OR PASSIVE ACTIVITIES

## 2024-02-02 ENCOUNTER — APPOINTMENT (OUTPATIENT)
Facility: HOSPITAL | Age: 74
DRG: 291 | End: 2024-02-02
Attending: PODIATRIST
Payer: MEDICARE

## 2024-02-02 LAB
ALBUMIN SERPL-MCNC: 2.9 G/DL (ref 3.4–5)
ANION GAP SERPL CALC-SCNC: 3 MMOL/L (ref 3–18)
APTT PPP: 42.6 SEC (ref 23–36.4)
BASOPHILS # BLD: 0 K/UL (ref 0–0.1)
BASOPHILS NFR BLD: 0 % (ref 0–2)
BUN SERPL-MCNC: 44 MG/DL (ref 7–18)
BUN/CREAT SERPL: 19 (ref 12–20)
CALCIUM SERPL-MCNC: 8.7 MG/DL (ref 8.5–10.1)
CHLORIDE SERPL-SCNC: 111 MMOL/L (ref 100–111)
CO2 SERPL-SCNC: 25 MMOL/L (ref 21–32)
CREAT SERPL-MCNC: 2.3 MG/DL (ref 0.6–1.3)
DIFFERENTIAL METHOD BLD: ABNORMAL
EOSINOPHIL # BLD: 0 K/UL (ref 0–0.4)
EOSINOPHIL NFR BLD: 0 % (ref 0–5)
ERYTHROCYTE [DISTWIDTH] IN BLOOD BY AUTOMATED COUNT: 15.4 % (ref 11.6–14.5)
GLUCOSE BLD STRIP.AUTO-MCNC: 120 MG/DL (ref 70–110)
GLUCOSE BLD STRIP.AUTO-MCNC: 147 MG/DL (ref 70–110)
GLUCOSE BLD STRIP.AUTO-MCNC: 165 MG/DL (ref 70–110)
GLUCOSE BLD STRIP.AUTO-MCNC: 253 MG/DL (ref 70–110)
GLUCOSE SERPL-MCNC: 101 MG/DL (ref 74–99)
HCT VFR BLD AUTO: 23.4 % (ref 35–45)
HGB BLD-MCNC: 7.1 G/DL (ref 12–16)
IMM GRANULOCYTES # BLD AUTO: 0 K/UL (ref 0–0.04)
IMM GRANULOCYTES NFR BLD AUTO: 0 % (ref 0–0.5)
LYMPHOCYTES # BLD: 0.9 K/UL (ref 0.9–3.6)
LYMPHOCYTES NFR BLD: 13 % (ref 21–52)
MAGNESIUM SERPL-MCNC: 1.9 MG/DL (ref 1.6–2.6)
MCH RBC QN AUTO: 29.7 PG (ref 24–34)
MCHC RBC AUTO-ENTMCNC: 30.3 G/DL (ref 31–37)
MCV RBC AUTO: 97.9 FL (ref 78–100)
MONOCYTES # BLD: 0.6 K/UL (ref 0.05–1.2)
MONOCYTES NFR BLD: 9 % (ref 3–10)
NEUTS SEG # BLD: 5.5 K/UL (ref 1.8–8)
NEUTS SEG NFR BLD: 78 % (ref 40–73)
NRBC # BLD: 0.08 K/UL (ref 0–0.01)
NRBC BLD-RTO: 1.1 PER 100 WBC
PHOSPHATE SERPL-MCNC: 3.1 MG/DL (ref 2.5–4.9)
PLATELET # BLD AUTO: 328 K/UL (ref 135–420)
PMV BLD AUTO: 9.7 FL (ref 9.2–11.8)
POTASSIUM SERPL-SCNC: 5.4 MMOL/L (ref 3.5–5.5)
RBC # BLD AUTO: 2.39 M/UL (ref 4.2–5.3)
SODIUM SERPL-SCNC: 139 MMOL/L (ref 136–145)
WBC # BLD AUTO: 7 K/UL (ref 4.6–13.2)

## 2024-02-02 PROCEDURE — 6370000000 HC RX 637 (ALT 250 FOR IP): Performed by: INTERNAL MEDICINE

## 2024-02-02 PROCEDURE — 83735 ASSAY OF MAGNESIUM: CPT

## 2024-02-02 PROCEDURE — 94761 N-INVAS EAR/PLS OXIMETRY MLT: CPT

## 2024-02-02 PROCEDURE — 99233 SBSQ HOSP IP/OBS HIGH 50: CPT | Performed by: INTERNAL MEDICINE

## 2024-02-02 PROCEDURE — 2700000000 HC OXYGEN THERAPY PER DAY

## 2024-02-02 PROCEDURE — 80069 RENAL FUNCTION PANEL: CPT

## 2024-02-02 PROCEDURE — 82962 GLUCOSE BLOOD TEST: CPT

## 2024-02-02 PROCEDURE — 94640 AIRWAY INHALATION TREATMENT: CPT

## 2024-02-02 PROCEDURE — 94668 MNPJ CHEST WALL SBSQ: CPT

## 2024-02-02 PROCEDURE — 85730 THROMBOPLASTIN TIME PARTIAL: CPT

## 2024-02-02 PROCEDURE — 6360000002 HC RX W HCPCS: Performed by: INTERNAL MEDICINE

## 2024-02-02 PROCEDURE — 36415 COLL VENOUS BLD VENIPUNCTURE: CPT

## 2024-02-02 PROCEDURE — 2580000003 HC RX 258: Performed by: HOSPITALIST

## 2024-02-02 PROCEDURE — 93971 EXTREMITY STUDY: CPT

## 2024-02-02 PROCEDURE — 85025 COMPLETE CBC W/AUTO DIFF WBC: CPT

## 2024-02-02 PROCEDURE — 6370000000 HC RX 637 (ALT 250 FOR IP): Performed by: HOSPITALIST

## 2024-02-02 PROCEDURE — 99232 SBSQ HOSP IP/OBS MODERATE 35: CPT | Performed by: PHYSICIAN ASSISTANT

## 2024-02-02 PROCEDURE — 1100000003 HC PRIVATE W/ TELEMETRY

## 2024-02-02 PROCEDURE — 6360000002 HC RX W HCPCS: Performed by: HOSPITALIST

## 2024-02-02 RX ORDER — HEPARIN SODIUM 10000 [USP'U]/100ML
5-30 INJECTION, SOLUTION INTRAVENOUS CONTINUOUS
Status: DISCONTINUED | OUTPATIENT
Start: 2024-02-02 | End: 2024-02-06

## 2024-02-02 RX ORDER — HEPARIN SODIUM 1000 [USP'U]/ML
40 INJECTION, SOLUTION INTRAVENOUS; SUBCUTANEOUS PRN
Status: DISCONTINUED | OUTPATIENT
Start: 2024-02-02 | End: 2024-02-06 | Stop reason: HOSPADM

## 2024-02-02 RX ORDER — METOLAZONE 5 MG/1
2.5 TABLET ORAL EVERY OTHER DAY
Status: DISCONTINUED | OUTPATIENT
Start: 2024-02-02 | End: 2024-02-06 | Stop reason: HOSPADM

## 2024-02-02 RX ORDER — BUMETANIDE 0.25 MG/ML
2 INJECTION INTRAMUSCULAR; INTRAVENOUS EVERY 12 HOURS
Status: DISCONTINUED | OUTPATIENT
Start: 2024-02-02 | End: 2024-02-04

## 2024-02-02 RX ORDER — HEPARIN SODIUM 1000 [USP'U]/ML
80 INJECTION, SOLUTION INTRAVENOUS; SUBCUTANEOUS PRN
Status: DISCONTINUED | OUTPATIENT
Start: 2024-02-02 | End: 2024-02-06 | Stop reason: HOSPADM

## 2024-02-02 RX ADMIN — METOPROLOL SUCCINATE 25 MG: 25 TABLET, EXTENDED RELEASE ORAL at 09:05

## 2024-02-02 RX ADMIN — FOLIC ACID 1 MG: 1 TABLET ORAL at 09:05

## 2024-02-02 RX ADMIN — INSULIN LISPRO 2 UNITS: 100 INJECTION, SOLUTION INTRAVENOUS; SUBCUTANEOUS at 17:23

## 2024-02-02 RX ADMIN — ATORVASTATIN CALCIUM 40 MG: 40 TABLET, FILM COATED ORAL at 20:14

## 2024-02-02 RX ADMIN — IPRATROPIUM BROMIDE 0.5 MG: 0.5 SOLUTION RESPIRATORY (INHALATION) at 19:29

## 2024-02-02 RX ADMIN — PREDNISONE 40 MG: 20 TABLET ORAL at 09:05

## 2024-02-02 RX ADMIN — ASPIRIN 81 MG: 81 TABLET, CHEWABLE ORAL at 09:05

## 2024-02-02 RX ADMIN — CYANOCOBALAMIN TAB 1000 MCG 1000 MCG: 1000 TAB at 09:05

## 2024-02-02 RX ADMIN — ARFORMOTEROL TARTRATE 15 MCG: 15 SOLUTION RESPIRATORY (INHALATION) at 19:29

## 2024-02-02 RX ADMIN — GABAPENTIN 400 MG: 400 CAPSULE ORAL at 17:22

## 2024-02-02 RX ADMIN — ARFORMOTEROL TARTRATE 15 MCG: 15 SOLUTION RESPIRATORY (INHALATION) at 09:28

## 2024-02-02 RX ADMIN — TRAMADOL HYDROCHLORIDE 25 MG: 50 TABLET ORAL at 13:45

## 2024-02-02 RX ADMIN — POLYETHYLENE GLYCOL 3350 17 G: 17 POWDER, FOR SOLUTION ORAL at 09:05

## 2024-02-02 RX ADMIN — SODIUM CHLORIDE, PRESERVATIVE FREE 10 ML: 5 INJECTION INTRAVENOUS at 20:14

## 2024-02-02 RX ADMIN — APIXABAN 5 MG: 5 TABLET, FILM COATED ORAL at 09:05

## 2024-02-02 RX ADMIN — GUAIFENESIN 600 MG: 600 TABLET, EXTENDED RELEASE ORAL at 20:14

## 2024-02-02 RX ADMIN — IPRATROPIUM BROMIDE 0.5 MG: 0.5 SOLUTION RESPIRATORY (INHALATION) at 09:28

## 2024-02-02 RX ADMIN — METOLAZONE 2.5 MG: 5 TABLET ORAL at 13:38

## 2024-02-02 RX ADMIN — FAMOTIDINE 20 MG: 20 TABLET ORAL at 09:05

## 2024-02-02 RX ADMIN — HEPARIN SODIUM 18 UNITS/KG/HR: 10000 INJECTION, SOLUTION INTRAVENOUS at 20:42

## 2024-02-02 RX ADMIN — Medication 400 MG: at 09:05

## 2024-02-02 RX ADMIN — TRAMADOL HYDROCHLORIDE 25 MG: 50 TABLET ORAL at 06:19

## 2024-02-02 RX ADMIN — GUAIFENESIN 600 MG: 600 TABLET, EXTENDED RELEASE ORAL at 09:05

## 2024-02-02 RX ADMIN — HEPARIN SODIUM 7250 UNITS: 1000 INJECTION INTRAVENOUS; SUBCUTANEOUS at 23:23

## 2024-02-02 RX ADMIN — INSULIN GLARGINE 12 UNITS: 100 INJECTION, SOLUTION SUBCUTANEOUS at 09:05

## 2024-02-02 RX ADMIN — IPRATROPIUM BROMIDE 0.5 MG: 0.5 SOLUTION RESPIRATORY (INHALATION) at 17:22

## 2024-02-02 ASSESSMENT — PAIN SCALES - WONG BAKER
WONGBAKER_NUMERICALRESPONSE: 0

## 2024-02-02 ASSESSMENT — PAIN DESCRIPTION - LOCATION
LOCATION: FOOT

## 2024-02-02 ASSESSMENT — PAIN SCALES - GENERAL
PAINLEVEL_OUTOF10: 8
PAINLEVEL_OUTOF10: 0
PAINLEVEL_OUTOF10: 7
PAINLEVEL_OUTOF10: 0
PAINLEVEL_OUTOF10: 8
PAINLEVEL_OUTOF10: 7
PAINLEVEL_OUTOF10: 0
PAINLEVEL_OUTOF10: 7
PAINLEVEL_OUTOF10: 9

## 2024-02-02 ASSESSMENT — PAIN DESCRIPTION - ORIENTATION: ORIENTATION: RIGHT;LEFT;MID

## 2024-02-02 ASSESSMENT — PAIN DESCRIPTION - DESCRIPTORS: DESCRIPTORS: THROBBING;SHOOTING

## 2024-02-02 ASSESSMENT — PAIN - FUNCTIONAL ASSESSMENT: PAIN_FUNCTIONAL_ASSESSMENT: INTOLERABLE, UNABLE TO DO ANY ACTIVE OR PASSIVE ACTIVITIES

## 2024-02-03 PROBLEM — M10.9 ACUTE GOUT OF LEFT FOOT: Status: ACTIVE | Noted: 2024-02-03

## 2024-02-03 LAB
ALBUMIN SERPL-MCNC: 2.9 G/DL (ref 3.4–5)
ANION GAP SERPL CALC-SCNC: 0 MMOL/L (ref 3–18)
APTT PPP: 123.8 SEC (ref 23–36.4)
APTT PPP: 126.1 SEC (ref 23–36.4)
APTT PPP: 95.2 SEC (ref 23–36.4)
APTT PPP: >180 SEC (ref 23–36.4)
BUN SERPL-MCNC: 45 MG/DL (ref 7–18)
BUN/CREAT SERPL: 19 (ref 12–20)
CALCIUM SERPL-MCNC: 8.8 MG/DL (ref 8.5–10.1)
CHLORIDE SERPL-SCNC: 112 MMOL/L (ref 100–111)
CO2 SERPL-SCNC: 25 MMOL/L (ref 21–32)
CREAT SERPL-MCNC: 2.33 MG/DL (ref 0.6–1.3)
ECHO BSA: 2.02 M2
EKG ATRIAL RATE: 92 BPM
EKG DIAGNOSIS: NORMAL
EKG P AXIS: 76 DEGREES
EKG P-R INTERVAL: 172 MS
EKG Q-T INTERVAL: 358 MS
EKG QRS DURATION: 80 MS
EKG QTC CALCULATION (BAZETT): 442 MS
EKG R AXIS: 54 DEGREES
EKG T AXIS: 108 DEGREES
EKG VENTRICULAR RATE: 92 BPM
ERYTHROCYTE [DISTWIDTH] IN BLOOD BY AUTOMATED COUNT: 15.5 % (ref 11.6–14.5)
GLUCOSE BLD STRIP.AUTO-MCNC: 145 MG/DL (ref 70–110)
GLUCOSE BLD STRIP.AUTO-MCNC: 151 MG/DL (ref 70–110)
GLUCOSE BLD STRIP.AUTO-MCNC: 155 MG/DL (ref 70–110)
GLUCOSE BLD STRIP.AUTO-MCNC: 172 MG/DL (ref 70–110)
GLUCOSE SERPL-MCNC: 113 MG/DL (ref 74–99)
HCT VFR BLD AUTO: 22 % (ref 35–45)
HGB BLD-MCNC: 6.6 G/DL (ref 12–16)
MCH RBC QN AUTO: 29.9 PG (ref 24–34)
MCHC RBC AUTO-ENTMCNC: 30 G/DL (ref 31–37)
MCV RBC AUTO: 99.5 FL (ref 78–100)
NRBC # BLD: 0.29 K/UL (ref 0–0.01)
NRBC BLD-RTO: 3.5 PER 100 WBC
PHOSPHATE SERPL-MCNC: 3 MG/DL (ref 2.5–4.9)
PLATELET # BLD AUTO: 311 K/UL (ref 135–420)
PMV BLD AUTO: 9.8 FL (ref 9.2–11.8)
POTASSIUM SERPL-SCNC: 4.7 MMOL/L (ref 3.5–5.5)
RBC # BLD AUTO: 2.21 M/UL (ref 4.2–5.3)
SODIUM SERPL-SCNC: 137 MMOL/L (ref 136–145)
TROPONIN I SERPL HS-MCNC: 16 NG/L (ref 0–54)
WBC # BLD AUTO: 8.3 K/UL (ref 4.6–13.2)

## 2024-02-03 PROCEDURE — 6360000002 HC RX W HCPCS: Performed by: INTERNAL MEDICINE

## 2024-02-03 PROCEDURE — 6370000000 HC RX 637 (ALT 250 FOR IP): Performed by: HOSPITALIST

## 2024-02-03 PROCEDURE — 2580000003 HC RX 258: Performed by: HOSPITALIST

## 2024-02-03 PROCEDURE — 6360000002 HC RX W HCPCS: Performed by: HOSPITALIST

## 2024-02-03 PROCEDURE — 1100000003 HC PRIVATE W/ TELEMETRY

## 2024-02-03 PROCEDURE — 85027 COMPLETE CBC AUTOMATED: CPT

## 2024-02-03 PROCEDURE — 99232 SBSQ HOSP IP/OBS MODERATE 35: CPT | Performed by: HOSPITALIST

## 2024-02-03 PROCEDURE — 93005 ELECTROCARDIOGRAM TRACING: CPT | Performed by: HOSPITALIST

## 2024-02-03 PROCEDURE — 80069 RENAL FUNCTION PANEL: CPT

## 2024-02-03 PROCEDURE — 94668 MNPJ CHEST WALL SBSQ: CPT

## 2024-02-03 PROCEDURE — 93010 ELECTROCARDIOGRAM REPORT: CPT | Performed by: INTERNAL MEDICINE

## 2024-02-03 PROCEDURE — 94640 AIRWAY INHALATION TREATMENT: CPT

## 2024-02-03 PROCEDURE — 84484 ASSAY OF TROPONIN QUANT: CPT

## 2024-02-03 PROCEDURE — 2700000000 HC OXYGEN THERAPY PER DAY

## 2024-02-03 PROCEDURE — 93971 EXTREMITY STUDY: CPT | Performed by: INTERNAL MEDICINE

## 2024-02-03 PROCEDURE — 36415 COLL VENOUS BLD VENIPUNCTURE: CPT

## 2024-02-03 PROCEDURE — 82962 GLUCOSE BLOOD TEST: CPT

## 2024-02-03 PROCEDURE — 99232 SBSQ HOSP IP/OBS MODERATE 35: CPT | Performed by: PHYSICIAN ASSISTANT

## 2024-02-03 PROCEDURE — 85730 THROMBOPLASTIN TIME PARTIAL: CPT

## 2024-02-03 PROCEDURE — 94761 N-INVAS EAR/PLS OXIMETRY MLT: CPT

## 2024-02-03 RX ORDER — OXYCODONE HYDROCHLORIDE AND ACETAMINOPHEN 5; 325 MG/1; MG/1
1 TABLET ORAL EVERY 8 HOURS PRN
Status: DISCONTINUED | OUTPATIENT
Start: 2024-02-03 | End: 2024-02-06 | Stop reason: HOSPADM

## 2024-02-03 RX ADMIN — METOPROLOL SUCCINATE 25 MG: 25 TABLET, EXTENDED RELEASE ORAL at 09:30

## 2024-02-03 RX ADMIN — BUMETANIDE 2 MG: 0.25 INJECTION INTRAMUSCULAR; INTRAVENOUS at 09:30

## 2024-02-03 RX ADMIN — HEPARIN SODIUM 19 UNITS/KG/HR: 10000 INJECTION, SOLUTION INTRAVENOUS at 14:08

## 2024-02-03 RX ADMIN — FAMOTIDINE 20 MG: 20 TABLET ORAL at 09:30

## 2024-02-03 RX ADMIN — SODIUM CHLORIDE, PRESERVATIVE FREE 10 ML: 5 INJECTION INTRAVENOUS at 09:31

## 2024-02-03 RX ADMIN — IPRATROPIUM BROMIDE 0.5 MG: 0.5 SOLUTION RESPIRATORY (INHALATION) at 19:33

## 2024-02-03 RX ADMIN — ARFORMOTEROL TARTRATE 15 MCG: 15 SOLUTION RESPIRATORY (INHALATION) at 19:33

## 2024-02-03 RX ADMIN — ARFORMOTEROL TARTRATE 15 MCG: 15 SOLUTION RESPIRATORY (INHALATION) at 09:19

## 2024-02-03 RX ADMIN — OXYCODONE HYDROCHLORIDE AND ACETAMINOPHEN 1 TABLET: 5; 325 TABLET ORAL at 17:17

## 2024-02-03 RX ADMIN — Medication 400 MG: at 09:30

## 2024-02-03 RX ADMIN — ATORVASTATIN CALCIUM 40 MG: 40 TABLET, FILM COATED ORAL at 21:38

## 2024-02-03 RX ADMIN — IPRATROPIUM BROMIDE 0.5 MG: 0.5 SOLUTION RESPIRATORY (INHALATION) at 13:36

## 2024-02-03 RX ADMIN — GABAPENTIN 400 MG: 400 CAPSULE ORAL at 17:27

## 2024-02-03 RX ADMIN — FOLIC ACID 1 MG: 1 TABLET ORAL at 09:30

## 2024-02-03 RX ADMIN — INSULIN GLARGINE 12 UNITS: 100 INJECTION, SOLUTION SUBCUTANEOUS at 09:29

## 2024-02-03 RX ADMIN — TRAMADOL HYDROCHLORIDE 25 MG: 50 TABLET ORAL at 09:29

## 2024-02-03 RX ADMIN — GUAIFENESIN 600 MG: 600 TABLET, EXTENDED RELEASE ORAL at 21:38

## 2024-02-03 RX ADMIN — POLYETHYLENE GLYCOL 3350 17 G: 17 POWDER, FOR SOLUTION ORAL at 09:30

## 2024-02-03 RX ADMIN — IPRATROPIUM BROMIDE 0.5 MG: 0.5 SOLUTION RESPIRATORY (INHALATION) at 09:19

## 2024-02-03 RX ADMIN — ASPIRIN 81 MG: 81 TABLET, CHEWABLE ORAL at 09:30

## 2024-02-03 RX ADMIN — CYANOCOBALAMIN TAB 1000 MCG 1000 MCG: 1000 TAB at 09:30

## 2024-02-03 RX ADMIN — SODIUM CHLORIDE, PRESERVATIVE FREE 10 ML: 5 INJECTION INTRAVENOUS at 21:48

## 2024-02-03 RX ADMIN — GUAIFENESIN 600 MG: 600 TABLET, EXTENDED RELEASE ORAL at 09:30

## 2024-02-03 ASSESSMENT — PAIN DESCRIPTION - DESCRIPTORS
DESCRIPTORS: ACHING
DESCRIPTORS: SHARP

## 2024-02-03 ASSESSMENT — PAIN DESCRIPTION - FREQUENCY
FREQUENCY: CONTINUOUS
FREQUENCY: CONTINUOUS

## 2024-02-03 ASSESSMENT — PAIN DESCRIPTION - ORIENTATION
ORIENTATION: LEFT
ORIENTATION: MID

## 2024-02-03 ASSESSMENT — PAIN DESCRIPTION - LOCATION
LOCATION: KNEE
LOCATION: CHEST

## 2024-02-03 ASSESSMENT — PAIN SCALES - GENERAL
PAINLEVEL_OUTOF10: 10
PAINLEVEL_OUTOF10: 0
PAINLEVEL_OUTOF10: 0
PAINLEVEL_OUTOF10: 3
PAINLEVEL_OUTOF10: 6
PAINLEVEL_OUTOF10: 3
PAINLEVEL_OUTOF10: 0

## 2024-02-03 ASSESSMENT — PAIN SCALES - WONG BAKER
WONGBAKER_NUMERICALRESPONSE: 0
WONGBAKER_NUMERICALRESPONSE: 0

## 2024-02-03 ASSESSMENT — PAIN DESCRIPTION - PAIN TYPE
TYPE: ACUTE PAIN
TYPE: ACUTE PAIN

## 2024-02-03 ASSESSMENT — PAIN DESCRIPTION - ONSET
ONSET: SUDDEN
ONSET: ON-GOING

## 2024-02-04 LAB
ALBUMIN SERPL-MCNC: 3 G/DL (ref 3.4–5)
ALBUMIN/GLOB SERPL: 1.4 (ref 0.8–1.7)
ALP SERPL-CCNC: 112 U/L (ref 45–117)
ALT SERPL-CCNC: 17 U/L (ref 13–56)
ANION GAP SERPL CALC-SCNC: 4 MMOL/L (ref 3–18)
APTT PPP: 176.6 SEC (ref 23–36.4)
APTT PPP: 72.7 SEC (ref 23–36.4)
APTT PPP: 86.9 SEC (ref 23–36.4)
AST SERPL-CCNC: 11 U/L (ref 10–38)
BASOPHILS # BLD: 0 K/UL (ref 0–0.1)
BASOPHILS NFR BLD: 0 % (ref 0–2)
BILIRUB SERPL-MCNC: 0.7 MG/DL (ref 0.2–1)
BUN SERPL-MCNC: 43 MG/DL (ref 7–18)
BUN/CREAT SERPL: 18 (ref 12–20)
CALCIUM SERPL-MCNC: 8.9 MG/DL (ref 8.5–10.1)
CHLORIDE SERPL-SCNC: 109 MMOL/L (ref 100–111)
CO2 SERPL-SCNC: 27 MMOL/L (ref 21–32)
CREAT SERPL-MCNC: 2.34 MG/DL (ref 0.6–1.3)
DIFFERENTIAL METHOD BLD: ABNORMAL
EOSINOPHIL # BLD: 0.2 K/UL (ref 0–0.4)
EOSINOPHIL NFR BLD: 2 % (ref 0–5)
ERYTHROCYTE [DISTWIDTH] IN BLOOD BY AUTOMATED COUNT: 15.7 % (ref 11.6–14.5)
GLOBULIN SER CALC-MCNC: 2.2 G/DL (ref 2–4)
GLUCOSE BLD STRIP.AUTO-MCNC: 124 MG/DL (ref 70–110)
GLUCOSE BLD STRIP.AUTO-MCNC: 129 MG/DL (ref 70–110)
GLUCOSE BLD STRIP.AUTO-MCNC: 172 MG/DL (ref 70–110)
GLUCOSE BLD STRIP.AUTO-MCNC: 99 MG/DL (ref 70–110)
GLUCOSE SERPL-MCNC: 93 MG/DL (ref 74–99)
HCT VFR BLD AUTO: 23.5 % (ref 35–45)
HGB BLD-MCNC: 7.2 G/DL (ref 12–16)
IMM GRANULOCYTES # BLD AUTO: 0.1 K/UL (ref 0–0.04)
IMM GRANULOCYTES NFR BLD AUTO: 1 % (ref 0–0.5)
LYMPHOCYTES # BLD: 2.2 K/UL (ref 0.9–3.6)
LYMPHOCYTES NFR BLD: 30 % (ref 21–52)
MCH RBC QN AUTO: 29.9 PG (ref 24–34)
MCHC RBC AUTO-ENTMCNC: 30.6 G/DL (ref 31–37)
MCV RBC AUTO: 97.5 FL (ref 78–100)
MONOCYTES # BLD: 0.6 K/UL (ref 0.05–1.2)
MONOCYTES NFR BLD: 8 % (ref 3–10)
NEUTS SEG # BLD: 4.2 K/UL (ref 1.8–8)
NEUTS SEG NFR BLD: 58 % (ref 40–73)
NRBC # BLD: 0.21 K/UL (ref 0–0.01)
NRBC BLD-RTO: 2.9 PER 100 WBC
PLATELET # BLD AUTO: 334 K/UL (ref 135–420)
PMV BLD AUTO: 9.5 FL (ref 9.2–11.8)
POTASSIUM SERPL-SCNC: 4.5 MMOL/L (ref 3.5–5.5)
PROT SERPL-MCNC: 5.2 G/DL (ref 6.4–8.2)
RBC # BLD AUTO: 2.41 M/UL (ref 4.2–5.3)
SODIUM SERPL-SCNC: 140 MMOL/L (ref 136–145)
URATE SERPL-MCNC: 13.6 MG/DL (ref 2.6–7.2)
WBC # BLD AUTO: 7.2 K/UL (ref 4.6–13.2)

## 2024-02-04 PROCEDURE — 6370000000 HC RX 637 (ALT 250 FOR IP): Performed by: HOSPITALIST

## 2024-02-04 PROCEDURE — 85705 THROMBOPLASTIN INHIBITION: CPT

## 2024-02-04 PROCEDURE — 85025 COMPLETE CBC W/AUTO DIFF WBC: CPT

## 2024-02-04 PROCEDURE — 36415 COLL VENOUS BLD VENIPUNCTURE: CPT

## 2024-02-04 PROCEDURE — 80053 COMPREHEN METABOLIC PANEL: CPT

## 2024-02-04 PROCEDURE — 84550 ASSAY OF BLOOD/URIC ACID: CPT

## 2024-02-04 PROCEDURE — 82962 GLUCOSE BLOOD TEST: CPT

## 2024-02-04 PROCEDURE — 6370000000 HC RX 637 (ALT 250 FOR IP): Performed by: INTERNAL MEDICINE

## 2024-02-04 PROCEDURE — 2700000000 HC OXYGEN THERAPY PER DAY

## 2024-02-04 PROCEDURE — 85613 RUSSELL VIPER VENOM DILUTED: CPT

## 2024-02-04 PROCEDURE — 85732 THROMBOPLASTIN TIME PARTIAL: CPT

## 2024-02-04 PROCEDURE — 2580000003 HC RX 258: Performed by: HOSPITALIST

## 2024-02-04 PROCEDURE — 6360000002 HC RX W HCPCS: Performed by: HOSPITALIST

## 2024-02-04 PROCEDURE — 1100000003 HC PRIVATE W/ TELEMETRY

## 2024-02-04 PROCEDURE — 85670 THROMBIN TIME PLASMA: CPT

## 2024-02-04 PROCEDURE — 94761 N-INVAS EAR/PLS OXIMETRY MLT: CPT

## 2024-02-04 PROCEDURE — 6360000002 HC RX W HCPCS: Performed by: INTERNAL MEDICINE

## 2024-02-04 PROCEDURE — 86146 BETA-2 GLYCOPROTEIN ANTIBODY: CPT

## 2024-02-04 PROCEDURE — 85730 THROMBOPLASTIN TIME PARTIAL: CPT

## 2024-02-04 PROCEDURE — 94669 MECHANICAL CHEST WALL OSCILL: CPT

## 2024-02-04 PROCEDURE — 86147 CARDIOLIPIN ANTIBODY EA IG: CPT

## 2024-02-04 PROCEDURE — 99232 SBSQ HOSP IP/OBS MODERATE 35: CPT | Performed by: HOSPITALIST

## 2024-02-04 PROCEDURE — 94640 AIRWAY INHALATION TREATMENT: CPT

## 2024-02-04 PROCEDURE — 99232 SBSQ HOSP IP/OBS MODERATE 35: CPT | Performed by: PHYSICIAN ASSISTANT

## 2024-02-04 RX ORDER — BUMETANIDE 1 MG/1
2 TABLET ORAL 2 TIMES DAILY
Status: DISCONTINUED | OUTPATIENT
Start: 2024-02-04 | End: 2024-02-06 | Stop reason: HOSPADM

## 2024-02-04 RX ADMIN — OXYCODONE HYDROCHLORIDE AND ACETAMINOPHEN 1 TABLET: 5; 325 TABLET ORAL at 14:56

## 2024-02-04 RX ADMIN — GABAPENTIN 400 MG: 400 CAPSULE ORAL at 19:13

## 2024-02-04 RX ADMIN — Medication 400 MG: at 10:01

## 2024-02-04 RX ADMIN — IPRATROPIUM BROMIDE 0.5 MG: 0.5 SOLUTION RESPIRATORY (INHALATION) at 14:49

## 2024-02-04 RX ADMIN — HEPARIN SODIUM 3620 UNITS: 1000 INJECTION INTRAVENOUS; SUBCUTANEOUS at 13:06

## 2024-02-04 RX ADMIN — SODIUM CHLORIDE, PRESERVATIVE FREE 10 ML: 5 INJECTION INTRAVENOUS at 21:07

## 2024-02-04 RX ADMIN — GUAIFENESIN 600 MG: 600 TABLET, EXTENDED RELEASE ORAL at 10:01

## 2024-02-04 RX ADMIN — ARFORMOTEROL TARTRATE 15 MCG: 15 SOLUTION RESPIRATORY (INHALATION) at 20:17

## 2024-02-04 RX ADMIN — ASPIRIN 81 MG: 81 TABLET, CHEWABLE ORAL at 10:01

## 2024-02-04 RX ADMIN — SODIUM CHLORIDE, PRESERVATIVE FREE 10 ML: 5 INJECTION INTRAVENOUS at 10:02

## 2024-02-04 RX ADMIN — METOLAZONE 2.5 MG: 5 TABLET ORAL at 10:11

## 2024-02-04 RX ADMIN — ATORVASTATIN CALCIUM 40 MG: 40 TABLET, FILM COATED ORAL at 21:07

## 2024-02-04 RX ADMIN — GUAIFENESIN 600 MG: 600 TABLET, EXTENDED RELEASE ORAL at 21:07

## 2024-02-04 RX ADMIN — HEPARIN SODIUM 17 UNITS/KG/HR: 10000 INJECTION, SOLUTION INTRAVENOUS at 04:17

## 2024-02-04 RX ADMIN — ARFORMOTEROL TARTRATE 15 MCG: 15 SOLUTION RESPIRATORY (INHALATION) at 07:51

## 2024-02-04 RX ADMIN — BUMETANIDE 2 MG: 1 TABLET ORAL at 21:07

## 2024-02-04 RX ADMIN — FOLIC ACID 1 MG: 1 TABLET ORAL at 10:01

## 2024-02-04 RX ADMIN — FAMOTIDINE 20 MG: 20 TABLET ORAL at 10:01

## 2024-02-04 RX ADMIN — TRAMADOL HYDROCHLORIDE 25 MG: 50 TABLET ORAL at 21:06

## 2024-02-04 RX ADMIN — IPRATROPIUM BROMIDE 0.5 MG: 0.5 SOLUTION RESPIRATORY (INHALATION) at 20:17

## 2024-02-04 RX ADMIN — BUMETANIDE 2 MG: 0.25 INJECTION INTRAMUSCULAR; INTRAVENOUS at 10:00

## 2024-02-04 RX ADMIN — METOPROLOL SUCCINATE 25 MG: 25 TABLET, EXTENDED RELEASE ORAL at 10:01

## 2024-02-04 RX ADMIN — TRAMADOL HYDROCHLORIDE 25 MG: 50 TABLET ORAL at 10:07

## 2024-02-04 RX ADMIN — IPRATROPIUM BROMIDE 0.5 MG: 0.5 SOLUTION RESPIRATORY (INHALATION) at 07:51

## 2024-02-04 RX ADMIN — INSULIN GLARGINE 12 UNITS: 100 INJECTION, SOLUTION SUBCUTANEOUS at 10:00

## 2024-02-04 ASSESSMENT — PAIN DESCRIPTION - LOCATION
LOCATION: GENERALIZED
LOCATION: FOOT
LOCATION: FOOT
LOCATION: GENERALIZED
LOCATION: FOOT;KNEE

## 2024-02-04 ASSESSMENT — PAIN DESCRIPTION - ORIENTATION
ORIENTATION: RIGHT

## 2024-02-04 ASSESSMENT — PAIN SCALES - GENERAL
PAINLEVEL_OUTOF10: 7
PAINLEVEL_OUTOF10: 0
PAINLEVEL_OUTOF10: 9
PAINLEVEL_OUTOF10: 0
PAINLEVEL_OUTOF10: 10
PAINLEVEL_OUTOF10: 9
PAINLEVEL_OUTOF10: 10
PAINLEVEL_OUTOF10: 10

## 2024-02-04 ASSESSMENT — PAIN SCALES - WONG BAKER
WONGBAKER_NUMERICALRESPONSE: 0
WONGBAKER_NUMERICALRESPONSE: 0

## 2024-02-05 LAB
ALBUMIN SERPL-MCNC: 2.9 G/DL (ref 3.4–5)
ALBUMIN/GLOB SERPL: 1.2 (ref 0.8–1.7)
ALP SERPL-CCNC: 101 U/L (ref 45–117)
ALT SERPL-CCNC: 17 U/L (ref 13–56)
ANION GAP SERPL CALC-SCNC: 3 MMOL/L (ref 3–18)
APTT PPP: 119 SEC (ref 23–36.4)
APTT PPP: 77.6 SEC (ref 23–36.4)
APTT PPP: 87.9 SEC (ref 23–36.4)
AST SERPL-CCNC: 8 U/L (ref 10–38)
BASOPHILS # BLD: 0 K/UL (ref 0–0.1)
BASOPHILS NFR BLD: 0 % (ref 0–2)
BILIRUB SERPL-MCNC: 0.8 MG/DL (ref 0.2–1)
BUN SERPL-MCNC: 46 MG/DL (ref 7–18)
BUN/CREAT SERPL: 20 (ref 12–20)
CALCIUM SERPL-MCNC: 9 MG/DL (ref 8.5–10.1)
CHLORIDE SERPL-SCNC: 105 MMOL/L (ref 100–111)
CO2 SERPL-SCNC: 30 MMOL/L (ref 21–32)
CREAT SERPL-MCNC: 2.32 MG/DL (ref 0.6–1.3)
DIFFERENTIAL METHOD BLD: ABNORMAL
EOSINOPHIL # BLD: 0.2 K/UL (ref 0–0.4)
EOSINOPHIL NFR BLD: 3 % (ref 0–5)
ERYTHROCYTE [DISTWIDTH] IN BLOOD BY AUTOMATED COUNT: 15.6 % (ref 11.6–14.5)
GLOBULIN SER CALC-MCNC: 2.5 G/DL (ref 2–4)
GLUCOSE BLD STRIP.AUTO-MCNC: 126 MG/DL (ref 70–110)
GLUCOSE BLD STRIP.AUTO-MCNC: 134 MG/DL (ref 70–110)
GLUCOSE BLD STRIP.AUTO-MCNC: 136 MG/DL (ref 70–110)
GLUCOSE BLD STRIP.AUTO-MCNC: 97 MG/DL (ref 70–110)
GLUCOSE SERPL-MCNC: 95 MG/DL (ref 74–99)
HCT VFR BLD AUTO: 23.5 % (ref 35–45)
HGB BLD-MCNC: 7.3 G/DL (ref 12–16)
IMM GRANULOCYTES # BLD AUTO: 0.1 K/UL (ref 0–0.04)
IMM GRANULOCYTES NFR BLD AUTO: 1 % (ref 0–0.5)
LYMPHOCYTES # BLD: 1.8 K/UL (ref 0.9–3.6)
LYMPHOCYTES NFR BLD: 24 % (ref 21–52)
MCH RBC QN AUTO: 29.9 PG (ref 24–34)
MCHC RBC AUTO-ENTMCNC: 31.1 G/DL (ref 31–37)
MCV RBC AUTO: 96.3 FL (ref 78–100)
MONOCYTES # BLD: 0.6 K/UL (ref 0.05–1.2)
MONOCYTES NFR BLD: 9 % (ref 3–10)
NEUTS SEG # BLD: 4.5 K/UL (ref 1.8–8)
NEUTS SEG NFR BLD: 63 % (ref 40–73)
NRBC # BLD: 0.27 K/UL (ref 0–0.01)
NRBC BLD-RTO: 3.7 PER 100 WBC
PLATELET # BLD AUTO: 321 K/UL (ref 135–420)
PMV BLD AUTO: 9.4 FL (ref 9.2–11.8)
POTASSIUM SERPL-SCNC: 4.2 MMOL/L (ref 3.5–5.5)
PROT SERPL-MCNC: 5.4 G/DL (ref 6.4–8.2)
RBC # BLD AUTO: 2.44 M/UL (ref 4.2–5.3)
SODIUM SERPL-SCNC: 138 MMOL/L (ref 136–145)
URATE SERPL-MCNC: 14.8 MG/DL (ref 2.6–7.2)
WBC # BLD AUTO: 7.2 K/UL (ref 4.6–13.2)

## 2024-02-05 PROCEDURE — 99232 SBSQ HOSP IP/OBS MODERATE 35: CPT | Performed by: HOSPITALIST

## 2024-02-05 PROCEDURE — 85025 COMPLETE CBC W/AUTO DIFF WBC: CPT

## 2024-02-05 PROCEDURE — 2700000000 HC OXYGEN THERAPY PER DAY

## 2024-02-05 PROCEDURE — 6370000000 HC RX 637 (ALT 250 FOR IP): Performed by: HOSPITALIST

## 2024-02-05 PROCEDURE — 6370000000 HC RX 637 (ALT 250 FOR IP): Performed by: INTERNAL MEDICINE

## 2024-02-05 PROCEDURE — 36415 COLL VENOUS BLD VENIPUNCTURE: CPT

## 2024-02-05 PROCEDURE — 85730 THROMBOPLASTIN TIME PARTIAL: CPT

## 2024-02-05 PROCEDURE — 1100000003 HC PRIVATE W/ TELEMETRY

## 2024-02-05 PROCEDURE — 97530 THERAPEUTIC ACTIVITIES: CPT

## 2024-02-05 PROCEDURE — 94640 AIRWAY INHALATION TREATMENT: CPT

## 2024-02-05 PROCEDURE — 94669 MECHANICAL CHEST WALL OSCILL: CPT

## 2024-02-05 PROCEDURE — 82962 GLUCOSE BLOOD TEST: CPT

## 2024-02-05 PROCEDURE — 80053 COMPREHEN METABOLIC PANEL: CPT

## 2024-02-05 PROCEDURE — 6360000002 HC RX W HCPCS: Performed by: HOSPITALIST

## 2024-02-05 PROCEDURE — 6360000002 HC RX W HCPCS: Performed by: INTERNAL MEDICINE

## 2024-02-05 PROCEDURE — 2580000003 HC RX 258: Performed by: HOSPITALIST

## 2024-02-05 PROCEDURE — 84550 ASSAY OF BLOOD/URIC ACID: CPT

## 2024-02-05 RX ADMIN — HEPARIN SODIUM 14 UNITS/KG/HR: 10000 INJECTION, SOLUTION INTRAVENOUS at 23:37

## 2024-02-05 RX ADMIN — TRAMADOL HYDROCHLORIDE 25 MG: 50 TABLET ORAL at 12:51

## 2024-02-05 RX ADMIN — BUMETANIDE 2 MG: 1 TABLET ORAL at 09:42

## 2024-02-05 RX ADMIN — ASPIRIN 81 MG: 81 TABLET, CHEWABLE ORAL at 09:43

## 2024-02-05 RX ADMIN — OXYCODONE HYDROCHLORIDE AND ACETAMINOPHEN 1 TABLET: 5; 325 TABLET ORAL at 09:51

## 2024-02-05 RX ADMIN — BUMETANIDE 2 MG: 1 TABLET ORAL at 21:49

## 2024-02-05 RX ADMIN — IPRATROPIUM BROMIDE 0.5 MG: 0.5 SOLUTION RESPIRATORY (INHALATION) at 13:29

## 2024-02-05 RX ADMIN — INSULIN GLARGINE 12 UNITS: 100 INJECTION, SOLUTION SUBCUTANEOUS at 09:00

## 2024-02-05 RX ADMIN — IPRATROPIUM BROMIDE 0.5 MG: 0.5 SOLUTION RESPIRATORY (INHALATION) at 08:50

## 2024-02-05 RX ADMIN — FAMOTIDINE 20 MG: 20 TABLET ORAL at 09:42

## 2024-02-05 RX ADMIN — FOLIC ACID 1 MG: 1 TABLET ORAL at 09:42

## 2024-02-05 RX ADMIN — OXYCODONE HYDROCHLORIDE AND ACETAMINOPHEN 1 TABLET: 5; 325 TABLET ORAL at 00:15

## 2024-02-05 RX ADMIN — CYANOCOBALAMIN TAB 1000 MCG 1000 MCG: 1000 TAB at 09:42

## 2024-02-05 RX ADMIN — HEPARIN SODIUM 16 UNITS/KG/HR: 10000 INJECTION, SOLUTION INTRAVENOUS at 03:22

## 2024-02-05 RX ADMIN — SODIUM CHLORIDE, PRESERVATIVE FREE 10 ML: 5 INJECTION INTRAVENOUS at 21:46

## 2024-02-05 RX ADMIN — GUAIFENESIN 600 MG: 600 TABLET, EXTENDED RELEASE ORAL at 09:42

## 2024-02-05 RX ADMIN — OXYCODONE HYDROCHLORIDE AND ACETAMINOPHEN 1 TABLET: 5; 325 TABLET ORAL at 19:20

## 2024-02-05 RX ADMIN — ARFORMOTEROL TARTRATE 15 MCG: 15 SOLUTION RESPIRATORY (INHALATION) at 08:50

## 2024-02-05 RX ADMIN — ATORVASTATIN CALCIUM 40 MG: 40 TABLET, FILM COATED ORAL at 21:49

## 2024-02-05 RX ADMIN — GUAIFENESIN 600 MG: 600 TABLET, EXTENDED RELEASE ORAL at 21:49

## 2024-02-05 RX ADMIN — GABAPENTIN 400 MG: 400 CAPSULE ORAL at 19:21

## 2024-02-05 RX ADMIN — TRAMADOL HYDROCHLORIDE 25 MG: 50 TABLET ORAL at 23:19

## 2024-02-05 ASSESSMENT — PAIN DESCRIPTION - FREQUENCY
FREQUENCY: CONTINUOUS

## 2024-02-05 ASSESSMENT — PAIN SCALES - GENERAL
PAINLEVEL_OUTOF10: 9
PAINLEVEL_OUTOF10: 9
PAINLEVEL_OUTOF10: 7
PAINLEVEL_OUTOF10: 4
PAINLEVEL_OUTOF10: 7
PAINLEVEL_OUTOF10: 10
PAINLEVEL_OUTOF10: 8

## 2024-02-05 ASSESSMENT — PAIN DESCRIPTION - PAIN TYPE
TYPE: CHRONIC PAIN

## 2024-02-05 ASSESSMENT — PAIN DESCRIPTION - LOCATION
LOCATION: LEG;KNEE
LOCATION: LEG;KNEE
LOCATION: FOOT;KNEE
LOCATION: FOOT;KNEE
LOCATION: KNEE;LEG
LOCATION: LEG;KNEE

## 2024-02-05 ASSESSMENT — PAIN DESCRIPTION - ORIENTATION
ORIENTATION: LEFT

## 2024-02-05 ASSESSMENT — PAIN DESCRIPTION - ONSET
ONSET: ON-GOING

## 2024-02-05 ASSESSMENT — PAIN DESCRIPTION - DESCRIPTORS
DESCRIPTORS: ACHING;DISCOMFORT
DESCRIPTORS: SHARP
DESCRIPTORS: ACHING;THROBBING;SHARP

## 2024-02-05 NOTE — CONSULTS
Consult Note  Consult requested by: dr brittany hCuar is a 73 y.o. female Black /  who is being seen on consult for renal failure  Chief Complaint   Patient presents with    Shortness of Breath     Admission diagnosis: Exertional dyspnea [R06.09]  Acute on chronic combined systolic and diastolic CHF (congestive heart failure) ([facility]) [I50.43]  CHF (congestive heart failure), NYHA class I, acute on chronic, combined ([facility]) [I50.43]  Acute on chronic congestive heart failure, unspecified heart failure type ([facility]) [I50.9]      HPI:73 y o  female with hx of crf stage ,previously on dialysis,has avf left arm,admitted with fluid overload,hyperkalemia,treated with diuretics and lokelma,feels sl better,has numerous medical problems as listed below  Past Medical History:   Diagnosis Date    Anemia     Chondromalacia of both patellae     Chronic heart failure with preserved ejection fraction ([facility]) 01/13/2023    Chronic kidney disease     COPD (chronic obstructive pulmonary disease) ([facility]) 09/2015    mild-mod dz; Dr DEBORAH Yepez    Diabetes ([facility]) 2013    Dilated cardiomyopathy ([facility])     Dyslipidemia     Gout     Heart attack ([facility]) 10/18/2019    Stented    Hypercholesteremia 01/08/2014    Hypertension 2000    Mild protein-calorie malnutrition ([facility]) 8/7/2023    Obesity     Orthostatic hypotension 05/17/2016    Osteoarthritis of both knees     Popliteal cyst     Sleep apnea     not using cpap    Stage 3b chronic kidney disease ([facility]) 08/08/2022    Vitamin D deficiency 10/16/2014      Past Surgical History:   Procedure Laterality Date    CARDIAC CATHETERIZATION  2019    Stents x 2    CARDIAC PROCEDURE N/A 8/9/2023    Left heart cath performed by Mani King MD at [facility] CARDIAC CATH LAB    CARDIAC PROCEDURE N/A 8/9/2023    Coronary angiography performed by Mani King MD at [facility] CARDIAC CATH LAB    COLONOSCOPY N/A 06/15/2023    COLONOSCOPY performed by Michael Garcia MD at [facility] ENDOSCOPY    
     Nutrition Note    Pt presents with complaints of shortness of breath. Pt admitted for acute on chronic combined systolic and diastolic CHF (congestive heart failure. Consult noted for diet education on CHF and fluid restriction. RD following please see full note from 1/30/24. Pt seen at bedside. Writer reviewed heart failure nutrition therapy and fluid-restricted nutrition therapy. Writer advised pt when preparing meals at home avoid cooking with salt, use herbs and spices, read nutrition labels, avoid items such as wiggins and deli meats. Reviewed guidance for weight monitoring encouraged pt to weigh herself daily. Provided examples of fluids and foods with a high-water content. Also informed pt ways to manage thirst. Writer stressed the importance of limiting sodium and fluids to prevent shortness of breath, cough, fatigue and edema. All questions answered. Left handout for pt review.     Nutrition Education:  Educated on heart failure nutrition therapy and fluid-restricted nutrition therapy.  Learners: Patient  Readiness: Acceptance  Method: Explanation and Handout  Response: Verbalizes Understanding  Contact name and number provided.     Nutrition Recommendations/Plan:   Continue Current Diet.   Continue to monitor tolerance of PO, weight, labs, and plan of care during admission.            Electronically signed by LAURI WALLACE RD on 2/1/24 at 9:22 AM EST    Contact: [phone]      
     Nutrition Note    Pt presents with complaints of shortness of breath. Pt admitted for acute on chronic combined systolic and diastolic CHF (congestive heart failure. Consult noted for diet education on low K diet. RD following please see full note from 1/30/24. Pt seen at bedside. Writer reviewed Potassium content of foods handout with pt. Pt reports frequent consumption of chinese foods such as shrimp, roast pork, chicken with rice noodles, shrimp & beef, and egg drop soup. Writer informed pt of potential risk of high and low potassium levels, advised of the functions of potassium in the human body. Reviewed tips for selecting foods with potassium and provided examples of low and high potassium foods. All questions answered. Left handout for pt review.    Nutrition Education:  Educated on Potassium content of foods   Learners: Patient  Readiness: Acceptance  Method: Explanation and Handout  Response: Verbalizes Understanding  Contact name and number provided.     Nutrition Recommendations/Plan:   Continue Current Diet.   Continue to monitor tolerance of PO, weight, labs, and plan of care during admission.          Electronically signed by LAURI WALLACE RD on 1/31/24 at 9:11 AM EST    Contact: [phone]        
[unfilled]   Consult    Patient: Christy Donahue MRN: [id number]  SSN: [id number]    YOB: 1950  Age: 73 y.o.  Sex: female      Subjective:      Christy Donahue is a 73 y.o. female who is being seen for asked to evaluate and treat  acute left foot pain and swelling.  .    Past Medical History:   Diagnosis Date    Anemia     Chondromalacia of both patellae     Chronic heart failure with preserved ejection fraction ([facility]) 01/13/2023    Chronic kidney disease     COPD (chronic obstructive pulmonary disease) ([facility]) 09/2015    mild-mod dz; Dr DEBORAH Yepez    Diabetes ([facility]) 2013    Dilated cardiomyopathy ([facility])     Dyslipidemia     Gout     Heart attack ([facility]) 10/18/2019    Stented    Hypercholesteremia 01/08/2014    Hypertension 2000    Mild protein-calorie malnutrition ([facility]) 8/7/2023    Obesity     Orthostatic hypotension 05/17/2016    Osteoarthritis of both knees     Popliteal cyst     Sleep apnea     not using cpap    Stage 3b chronic kidney disease ([facility]) 08/08/2022    Vitamin D deficiency 10/16/2014     Past Surgical History:   Procedure Laterality Date    CARDIAC CATHETERIZATION  2019    Stents x 2    CARDIAC PROCEDURE N/A 8/9/2023    Left heart cath performed by Mani King MD at [facility] CARDIAC CATH LAB    CARDIAC PROCEDURE N/A 8/9/2023    Coronary angiography performed by Mani King MD at [facility] CARDIAC CATH LAB    COLONOSCOPY N/A 06/15/2023    COLONOSCOPY performed by Michael Garcia MD at [facility] ENDOSCOPY    COLOSTOMY  01/18/2023    DIALYSIS FISTULA CREATION Left 03/10/2023    LEFT ARM BRACHIAL ARTERY TO AXILLARY VEIN ARTERIO VENOUS GRAFT WITH ARTEGRAFT performed by Sanket Farmer MD at [facility] CARDIAC SURGERY    IR TUNNELED CATHETER PLACEMENT GREATER THAN 5 YEARS  09/21/2022    IR TUNNELED CATHETER PLACEMENT GREATER THAN 5 YEARS 9/21/2022 [facility] RAD ANGIO IR    IR TUNNELED CATHETER PLACEMENT GREATER THAN 5 YEARS  09/21/2022    NERVE BLOCK Left 03/10/2023    NERVE BLOCK (DEFINE) performed by Sanket ADAMS 
Just received the consult  Hx of crf stage 4,was on dialysis few months ago for reccurent pulm edema  Admitted with pulm edema,hyperkalemia  Continue diuretics,lokelma tonight  Suggest resuming dialysis if she agrees  
[facility].  Pulmonary, Critical Care, and Sleep Medicine    Initial Pulmonary Consultation    Name: Christy Donahue MRN: [id number]   : 1950 Hospital: [facility]   Date: 2024          This patient has been seen and evaluated at the request of Dr. Noel for COPD    IMPRESSION:   Acute hypoxia:  Etiology most likely driven by acute pulmonary edema given hx of severe CHF with systolic and diastolic dysfunction and severe valvular cardiomyopathy  Acute pulmonary edema:  Seen on CXR from admission  CHF, systolic and diastolic dysfunction with valvular cardiomyopathy: Patient had previous diastolic dysfunction, now with reduced LVEF of 35-40% with LV dilation.  Moderate to severe mitral valve regurgitation.  Pt has mutliple hospitalizations to [facility] and [facility] for CHF exacerbation with pulmonary edema  Wheezing: Likely cardiac wheeze in the setting of pulmonary edema, unlikely secondary to COPD.  Patient also has dysphonia, may have VCD versus redundant soft tissue and upper airway  Mild to moderate COPD: Patient is a former smoker, reports shortness of breath with exertion, although this I suspect may be more cardiac in nature  Pulmonary HTN:  Mainly WHO group 2 in nature, some contribution from WHO group 3 (suspect MICHAEL and mild COPD)  Deconditioning/debility  MICHAEL: Previously followed by sleep medicine-Dr. Dillon.  Patient then did not follow-up  Severe obesity: Body mass index is 34.28 kg/m².   Chronic pain syndrome  History of tobacco abuse: Quit smoking in 2019, likely 50+ pack year smoking history  DVT:  Acute nonocclusive thrombus in the left popliteal and peroneal veins as well as nonocclusive acute on chronic deep vein thrombosis in left posterior tibial vein seen on LE duplex from 2023     Patient Active Problem List   Diagnosis    Environmental allergies    MICHAEL on CPAP    Chronic pain syndrome    Osteoarthritis of both knees    Hypotension    Coronary artery 
([facility]) [I50.9].    Patient complains of: SOB  Christy Donahue is a 73 y.o. female, pmhx as stated above, who we are seeing in consult for CHF. Patient presented to the ER with c/o SOB. Patient was recently discharged from [facility] for anemia/GI bleed. Since she was discharged she complains of progressively worsening SOB with activity and lying flat. She is also complaining of cramping in her hands and legs. She reports compliance with his diuretics. Denies CP, N/V/D, diaphoresis, dizziness, near syncope or syncope, palpitations.       Cardiac risk factors: advanced age (older than 55 for men, 65 for women), dyslipidemia, hypertension, obesity (BMI >= 30 kg/m2), smoking/ tobacco exposure, and CAD, CMY   Review of Symptoms:  Except as stated above include:  Constitutional:  negative  Respiratory:  SOB  Cardiovascular:  negative  Gastrointestinal: negative  Genitourinary:  negative  Musculoskeletal:  cramping  Neurological:  Negative  Dermatological:  Negative  Endocrinological: Negative  Psychological:  Negative    Pertinent items are noted in HPI.     Past Medical History:     Past Medical History:   Diagnosis Date    Anemia     Chondromalacia of both patellae     Chronic heart failure with preserved ejection fraction ([facility]) 01/13/2023    Chronic kidney disease     COPD (chronic obstructive pulmonary disease) ([facility]) 09/2015    mild-mod dz; Dr DEBORAH Yepez    Diabetes ([facility]) 2013    Dilated cardiomyopathy ([facility])     Dyslipidemia     Gout     Heart attack ([facility]) 10/18/2019    Stented    Hypercholesteremia 01/08/2014    Hypertension 2000    Mild protein-calorie malnutrition ([facility]) 8/7/2023    Obesity     Orthostatic hypotension 05/17/2016    Osteoarthritis of both knees     Popliteal cyst     Sleep apnea     not using cpap    Stage 3b chronic kidney disease ([facility]) 08/08/2022    Vitamin D deficiency 10/16/2014         Social History:     Social History     Socioeconomic History    Marital status:    Tobacco Use    Smoking 
spirometry  Aspiration precautions including elevating HOB >30deg  PT/OT, OOB, ambulate with assistance as tolerated  Continue full dose anticoagulation given chronic DVT--will defer long-term management to vascular surgery  F/U with Sleep medicine as outpatient for repeat PSG and PAP therapy  Will follow     Subjective:   01/31/24   Patient seen and examined at bedside.  No acute events overnight.  Patient complaining that \"no one is telling me was going on\", I just lay here all day when you will come through\", \"I feel like nothing is going on\".  I explained the patient that she is receiving medical management for pulmonary edema, given current level of cardiac and pulmonary dysfunction, no mechanical or surgical interventions are required or indicated.  Patient reports ongoing cough which is helped by bronchodilators.  She reports she is unsure if she is having increased urine output with diuretics.  Denies nausea, vomiting, hemoptysis, chest pain.          HPI:  Patient is a 73 y.o. female with a past medical history of CHF, hypertension, obesity, GERD, diabetes, obesity, chronic hyperkalemia, chronic anemia, presented to [facility] with acute onset of shortness of breath.  Patient reports that she got home from [facility] discharged on Sunday.  Patient reports that the following day, patient became acutely short of breath and had to present to the closest ER.  While in the ER, patient found to have bilateral interstitial infiltrates consistent with pulmonary edema.  Patient did report some worsening LE swelling towards the end of her hospitalization and while at home.  Currently denies any episode of bronchospasm, denies any hemoptysis, nausea, vomiting, diarrhea, chest pain, sputum.      Last TTE from [facility] on 1/8/24  CONCLUSIONS    * Left ventricular systolic function is mildly reduced with an ejection   fraction of 42 % by Corrigan's biplane.     * Global hypokinesis of the 
[M79.672 (ICD-10-CM)]     PACS Images     Show images for Vascular duplex lower extremity venous left  Interpretation Summary         Acute non-occlusive deep vein thrombosis in the left popliteal vein and posterior tibial vein    Chronic non-occlusive deep vein thrombosis in the left peroneal vein.    For comparison purposes, the right common femoral vein was briefly interrogated. The vein demonstrates normal color filling and compressibility. Doppler flow was phasic and spontaneous.     Procedure Details    A gray scale, color Doppler imaging, spectral Doppler analysis and B-mode ultrasound was performed. During the study longitudinal and transverse views were obtained. Continuous wave doppler and pulsed wave doppler was performed. Overall the study quality was good.     Lower Extremity Venous Findings    Right Lower Venous    For comparison purposes, the right common femoral vein was briefly interrogated. The vein demonstrates normal color filling and compressibility. Doppler flow was phasic and spontaneous.     Common Femoral Vein: Patent, normal phasicity, spontaneous, normal augmentation, compressible.   Saphenofemoral Junction: Patent, compressible.   Left Lower Venous    Common Femoral Vein: Patent, normal phasicity, spontaneous, normal augmentation, compressible.   Greater Saphenous Vein: Vessel patent, compressible.   Saphenofemoral Junction: Patent, compressible.   Small Saphenous Vein: Patent, compressible.   Profunda Femoral Vein: Patent, compressible.   Proximal Femoral Vein: Patent, compressible.   Middle Femoral Vein: Patent, normal phasicity, spontaneous, normal augmentation, compressible.   Distal Femoral Vein: Patent, compressible.   Popliteal Vein: Acute non-occlusive thrombus.   Gastrocnemius Vein: Patent, compressible.   Posterior Tibial Vein: Acute non-occlusive thrombus.   Peroneal Vein: Chronic non-occlusive thrombus.         Posterior tibial artery Doppler waveforms are monophasic.   There

## 2024-02-06 ENCOUNTER — APPOINTMENT (OUTPATIENT)
Facility: HOSPITAL | Age: 74
DRG: 291 | End: 2024-02-06
Payer: MEDICARE

## 2024-02-06 ENCOUNTER — HOME HEALTH ADMISSION (OUTPATIENT)
Age: 74
End: 2024-02-06
Payer: MEDICARE

## 2024-02-06 VITALS
TEMPERATURE: 98.8 F | HEIGHT: 64 IN | RESPIRATION RATE: 18 BRPM | WEIGHT: 199.74 LBS | OXYGEN SATURATION: 98 % | BODY MASS INDEX: 34.1 KG/M2 | DIASTOLIC BLOOD PRESSURE: 69 MMHG | HEART RATE: 83 BPM | SYSTOLIC BLOOD PRESSURE: 131 MMHG

## 2024-02-06 PROBLEM — I50.43 ACUTE ON CHRONIC COMBINED SYSTOLIC AND DIASTOLIC CHF (CONGESTIVE HEART FAILURE) (HCC): Status: RESOLVED | Noted: 2024-01-29 | Resolved: 2024-02-06

## 2024-02-06 PROBLEM — M10.9 ACUTE GOUT OF LEFT FOOT: Status: RESOLVED | Noted: 2024-02-03 | Resolved: 2024-02-06

## 2024-02-06 LAB
ALBUMIN SERPL-MCNC: 3.2 G/DL (ref 3.4–5)
ALBUMIN/GLOB SERPL: 1.1 (ref 0.8–1.7)
ALP SERPL-CCNC: 140 U/L (ref 45–117)
ALT SERPL-CCNC: 13 U/L (ref 13–56)
ANION GAP SERPL CALC-SCNC: 6 MMOL/L (ref 3–18)
APTT PPP: 71.4 SEC (ref 23–36.4)
AST SERPL-CCNC: 12 U/L (ref 10–38)
B2 GLYCOPROT1 IGG SER-ACNC: <9 GPI IGG UNITS (ref 0–20)
B2 GLYCOPROT1 IGM SER-ACNC: <9 GPI IGM UNITS (ref 0–32)
BILIRUB SERPL-MCNC: 0.6 MG/DL (ref 0.2–1)
BUN SERPL-MCNC: 48 MG/DL (ref 7–18)
BUN/CREAT SERPL: 15 (ref 12–20)
CALCIUM SERPL-MCNC: 9.5 MG/DL (ref 8.5–10.1)
CHLORIDE SERPL-SCNC: 100 MMOL/L (ref 100–111)
CO2 SERPL-SCNC: 30 MMOL/L (ref 21–32)
CREAT SERPL-MCNC: 3.19 MG/DL (ref 0.6–1.3)
ECHO BSA: 2.02 M2
GLOBULIN SER CALC-MCNC: 2.8 G/DL (ref 2–4)
GLUCOSE BLD STRIP.AUTO-MCNC: 120 MG/DL (ref 70–110)
GLUCOSE BLD STRIP.AUTO-MCNC: 127 MG/DL (ref 70–110)
GLUCOSE SERPL-MCNC: 111 MG/DL (ref 74–99)
POTASSIUM SERPL-SCNC: 4.1 MMOL/L (ref 3.5–5.5)
PROT SERPL-MCNC: 6 G/DL (ref 6.4–8.2)
SODIUM SERPL-SCNC: 136 MMOL/L (ref 136–145)
URATE SERPL-MCNC: 15.6 MG/DL (ref 2.6–7.2)
VAS LEFT ARM GRAFT VOLUME FLOW: 255.2 ML/MIN
VAS LEFT ARTERIAL PROX ANASTOMOSIS AVF EDV: 138 CM/S
VAS LEFT ARTERIAL PROX ANASTOMOSIS AVF PSV: 405.8 CM/S
VAS LEFT AVF AVG DIAMETER 1: 0.92 CM
VAS LEFT AVF AVG DIAMETER 2: 0.81 CM
VAS LEFT AVF AVG DIAMETER 3: 0.79 CM
VAS LEFT AVF AVG DIST ANAST VOL FLOW: 2846 ML/MIN
VAS LEFT AVF AVG DIST OUTFLOW VOL FLOW: 467.4 ML/MIN
VAS LEFT AVF AVG GRAFT NAME: NORMAL
VAS LEFT AVF AVG INFLOW VOL FLOW: 922.1 ML/MIN
VAS LEFT AVF AVG MID OUTFLOW VOL FLOW: 832.4 ML/MIN
VAS LEFT AVF AVG OUTFLOW VESSEL NAME: NORMAL
VAS LEFT AVF AVG PROX ANAST VOL FLOW: 1452 ML/MIN
VAS LEFT AVF AVG PROX OUTFLOW VOL FLOW: 639.8 ML/MIN
VAS LEFT AVG AVF DEPTH 1: 0.19 CM
VAS LEFT AVG AVF DEPTH 2: 0.16 CM
VAS LEFT AVG AVF DEPTH 3: 0.21 CM
VAS LEFT DIST OUTFLOW AVF EDV: 37.7 CM/S
VAS LEFT DIST OUTFLOW AVF PSV: 64.1 CM/S
VAS LEFT INFLOW ARTERY AVF EDV: 79.7 CM/S
VAS LEFT INFLOW ARTERY AVF PSV: 219.1 CM/S
VAS LEFT MID OUTFLOW AVF EDV: 34 CM/S
VAS LEFT MID OUTFLOW AVF PSV: 92.3 CM/S
VAS LEFT OUTFLOW VESSEL AVF EDV: 44.6 CM/S
VAS LEFT OUTFLOW VESSEL AVF PSV: 91.1 CM/S
VAS LEFT PROX OUTFLOW AVF EDV: 34 CM/S
VAS LEFT PROX OUTFLOW AVF PSV: 113 CM/S
VAS LEFT VENOUS DIST ANASTOMOSIS AVF EDV: 484.7 CM/S
VAS LEFT VENOUS DIST ANASTOMOSIS AVF PSV: 1267 CM/S

## 2024-02-06 PROCEDURE — 6370000000 HC RX 637 (ALT 250 FOR IP): Performed by: INTERNAL MEDICINE

## 2024-02-06 PROCEDURE — 6370000000 HC RX 637 (ALT 250 FOR IP): Performed by: HOSPITALIST

## 2024-02-06 PROCEDURE — 82962 GLUCOSE BLOOD TEST: CPT

## 2024-02-06 PROCEDURE — 93990 DOPPLER FLOW TESTING: CPT

## 2024-02-06 PROCEDURE — 94761 N-INVAS EAR/PLS OXIMETRY MLT: CPT

## 2024-02-06 PROCEDURE — 6360000002 HC RX W HCPCS: Performed by: HOSPITALIST

## 2024-02-06 PROCEDURE — 93990 DOPPLER FLOW TESTING: CPT | Performed by: INTERNAL MEDICINE

## 2024-02-06 PROCEDURE — 97164 PT RE-EVAL EST PLAN CARE: CPT

## 2024-02-06 PROCEDURE — 2580000003 HC RX 258: Performed by: HOSPITALIST

## 2024-02-06 PROCEDURE — 99239 HOSP IP/OBS DSCHRG MGMT >30: CPT | Performed by: HOSPITALIST

## 2024-02-06 PROCEDURE — 80053 COMPREHEN METABOLIC PANEL: CPT

## 2024-02-06 PROCEDURE — 85730 THROMBOPLASTIN TIME PARTIAL: CPT

## 2024-02-06 PROCEDURE — 2700000000 HC OXYGEN THERAPY PER DAY

## 2024-02-06 PROCEDURE — 6360000002 HC RX W HCPCS: Performed by: INTERNAL MEDICINE

## 2024-02-06 PROCEDURE — 97530 THERAPEUTIC ACTIVITIES: CPT

## 2024-02-06 PROCEDURE — 36415 COLL VENOUS BLD VENIPUNCTURE: CPT

## 2024-02-06 PROCEDURE — 84550 ASSAY OF BLOOD/URIC ACID: CPT

## 2024-02-06 PROCEDURE — 94640 AIRWAY INHALATION TREATMENT: CPT

## 2024-02-06 RX ORDER — ALLOPURINOL 100 MG/1
100 TABLET ORAL DAILY
Qty: 30 TABLET | Refills: 0 | Status: SHIPPED | OUTPATIENT
Start: 2024-02-06

## 2024-02-06 RX ORDER — BUMETANIDE 2 MG/1
2 TABLET ORAL 2 TIMES DAILY
Qty: 30 TABLET | Refills: 3 | Status: SHIPPED | OUTPATIENT
Start: 2024-02-06

## 2024-02-06 RX ORDER — TRAMADOL HYDROCHLORIDE 50 MG/1
50 TABLET ORAL EVERY 8 HOURS PRN
Qty: 9 TABLET | Refills: 0 | Status: SHIPPED | OUTPATIENT
Start: 2024-02-06 | End: 2024-02-09

## 2024-02-06 RX ORDER — FOLIC ACID 1 MG/1
1 TABLET ORAL DAILY
Qty: 30 TABLET | Refills: 3 | Status: SHIPPED | OUTPATIENT
Start: 2024-02-07

## 2024-02-06 RX ORDER — POLYETHYLENE GLYCOL 3350 17 G/17G
17 POWDER, FOR SOLUTION ORAL DAILY
Qty: 527 G | Refills: 1 | Status: SHIPPED | OUTPATIENT
Start: 2024-02-07 | End: 2024-03-08

## 2024-02-06 RX ORDER — METOLAZONE 2.5 MG/1
2.5 TABLET ORAL EVERY OTHER DAY
Qty: 15 TABLET | Refills: 2 | Status: SHIPPED | OUTPATIENT
Start: 2024-02-08 | End: 2024-03-09

## 2024-02-06 RX ADMIN — OXYCODONE HYDROCHLORIDE AND ACETAMINOPHEN 1 TABLET: 5; 325 TABLET ORAL at 04:55

## 2024-02-06 RX ADMIN — INSULIN GLARGINE 12 UNITS: 100 INJECTION, SOLUTION SUBCUTANEOUS at 08:39

## 2024-02-06 RX ADMIN — CYANOCOBALAMIN TAB 1000 MCG 1000 MCG: 1000 TAB at 08:40

## 2024-02-06 RX ADMIN — ASPIRIN 81 MG: 81 TABLET, CHEWABLE ORAL at 08:41

## 2024-02-06 RX ADMIN — ARFORMOTEROL TARTRATE 15 MCG: 15 SOLUTION RESPIRATORY (INHALATION) at 08:31

## 2024-02-06 RX ADMIN — GUAIFENESIN 600 MG: 600 TABLET, EXTENDED RELEASE ORAL at 08:40

## 2024-02-06 RX ADMIN — FAMOTIDINE 20 MG: 20 TABLET ORAL at 08:40

## 2024-02-06 RX ADMIN — BUMETANIDE 2 MG: 1 TABLET ORAL at 08:40

## 2024-02-06 RX ADMIN — METOLAZONE 2.5 MG: 5 TABLET ORAL at 08:45

## 2024-02-06 RX ADMIN — OXYCODONE HYDROCHLORIDE AND ACETAMINOPHEN 1 TABLET: 5; 325 TABLET ORAL at 11:59

## 2024-02-06 RX ADMIN — FOLIC ACID 1 MG: 1 TABLET ORAL at 08:40

## 2024-02-06 RX ADMIN — APIXABAN 5 MG: 5 TABLET, FILM COATED ORAL at 11:59

## 2024-02-06 RX ADMIN — HEPARIN SODIUM 3620 UNITS: 1000 INJECTION INTRAVENOUS; SUBCUTANEOUS at 05:32

## 2024-02-06 RX ADMIN — SODIUM CHLORIDE, PRESERVATIVE FREE 10 ML: 5 INJECTION INTRAVENOUS at 08:41

## 2024-02-06 RX ADMIN — IPRATROPIUM BROMIDE 0.5 MG: 0.5 SOLUTION RESPIRATORY (INHALATION) at 08:31

## 2024-02-06 ASSESSMENT — PAIN DESCRIPTION - LOCATION
LOCATION: GENERALIZED
LOCATION: KNEE;FOOT

## 2024-02-06 ASSESSMENT — PAIN DESCRIPTION - ORIENTATION
ORIENTATION: LEFT;RIGHT
ORIENTATION: INNER

## 2024-02-06 ASSESSMENT — PAIN DESCRIPTION - DESCRIPTORS
DESCRIPTORS: ACHING
DESCRIPTORS: ACHING

## 2024-02-06 ASSESSMENT — PAIN SCALES - GENERAL
PAINLEVEL_OUTOF10: 10
PAINLEVEL_OUTOF10: 0
PAINLEVEL_OUTOF10: 10
PAINLEVEL_OUTOF10: 0

## 2024-02-06 NOTE — PLAN OF CARE
Problem: Discharge Planning  Goal: Discharge to home or other facility with appropriate resources  Outcome: Progressing     Problem: Safety - Adult  Goal: Free from fall injury  Outcome: Progressing     Problem: ABCDS Injury Assessment  Goal: Absence of physical injury  Outcome: Progressing     Problem: Pain  Goal: Verbalizes/displays adequate comfort level or baseline comfort level  Outcome: Progressing     Problem: Chronic Conditions and Co-morbidities  Goal: Patient's chronic conditions and co-morbidity symptoms are monitored and maintained or improved  Outcome: Progressing     
  Problem: Discharge Planning  Goal: Discharge to home or other facility with appropriate resources  Outcome: Progressing     Problem: Safety - Adult  Goal: Free from fall injury  Outcome: Progressing     Problem: ABCDS Injury Assessment  Goal: Absence of physical injury  Outcome: Progressing     Problem: Pain  Goal: Verbalizes/displays adequate comfort level or baseline comfort level  Outcome: Progressing     Problem: Chronic Conditions and Co-morbidities  Goal: Patient's chronic conditions and co-morbidity symptoms are monitored and maintained or improved  Outcome: Progressing     
  Problem: Physical Therapy - Adult  Goal: By Discharge: Performs mobility at highest level of function for planned discharge setting.  See evaluation for individualized goals.  Description: Initiated  1/30/24  to be met within 7-10 days.    1.  Patient will move from supine to sit and sit to supine , scoot up and down, and roll side to side in bed with independence.    2.  Patient will transfer from bed to chair and chair to bed with modified independence using the least restrictive device.  3.  Patient will perform sit to stand with modified independence.  4.  Patient will ambulate with modified independence for 50 feet with the least restrictive device.   5.  Patient will ascend/descend 4 stairs with 2 handrail(s) with modified independence.    PLOF: Lives with  in a 1 story home with 4 NÉSTOR and bilateral rails. Mod I with RW.    2/6/2024 1539 by Sahara Funes, PT  Outcome: Progressing  PHYSICAL THERAPY RE-EVALUATION    Patient: Christy Donahue (73 y.o. female)  Date: 2/6/2024  Primary Diagnosis: Exertional dyspnea [R06.09]  Acute on chronic combined systolic and diastolic CHF (congestive heart failure) (HCC) [I50.43]  CHF (congestive heart failure), NYHA class I, acute on chronic, combined (HCC) [I50.43]  Acute on chronic congestive heart failure, unspecified heart failure type (HCC) [I50.9]       Precautions: Fall Risk, Contact Precautions,  ,  ,  ,  ,  ,  ,      ASSESSMENT :  Patient on BSC upon entry. She continues to c/o left leg pain. Tearful/emotional during conversation regarding discharge home. Seems concerned about pain management. States her  will be able to assist her, but he is not always home. Discussed home safety with mobility using RW and BSC. Patient completes hygiene on her own and transfer back to bed with supervision. She declines further mobility despite encouragement. Patient left resting in bed with all needs in reach. Notified nurse regarding patients concerns. 
  Problem: Physical Therapy - Adult  Goal: By Discharge: Performs mobility at highest level of function for planned discharge setting.  See evaluation for individualized goals.  Description: Initiated  1/30/24  to be met within 7-10 days.    1.  Patient will move from supine to sit and sit to supine , scoot up and down, and roll side to side in bed with independence.    2.  Patient will transfer from bed to chair and chair to bed with modified independence using the least restrictive device.  3.  Patient will perform sit to stand with modified independence.  4.  Patient will ambulate with modified independence for 50 feet with the least restrictive device.   5.  Patient will ascend/descend 4 stairs with 2 handrail(s) with modified independence.    PLOF: Lives with  in a 1 story home with 4 NÉSTOR and bilateral rails. Mod I with RW.    Outcome: Progressing       PHYSICAL THERAPY TREATMENT    Patient: Chirsty Donahue (73 y.o. female)  Date: 2/1/2024  Diagnosis: Exertional dyspnea [R06.09]  Acute on chronic combined systolic and diastolic CHF (congestive heart failure) (HCC) [I50.43]  CHF (congestive heart failure), NYHA class I, acute on chronic, combined (HCC) [I50.43]  Acute on chronic congestive heart failure, unspecified heart failure type (HCC) [I50.9] Acute on chronic combined systolic and diastolic CHF (congestive heart failure) (HCC)      Precautions: General Precautions, Fall Risk, Contact Precautions,  ,  ,  ,  ,  ,  ,      ASSESSMENT:  Pt received sitting up EOB in NAD, agreeable to PT evaluation despite L ankle pain. Pain is increased with global LT up to calf, AROM/PROM, and attempting to WB. TE completed with R LE tasks with minimal pain and decrease ROM on L LE for TE. Pt becomes tearful when talking about her pain, pt listened to and support during session and offered words of encouragement. After completing TE, requested to lay back down in bed, mod I with HHA from bed rails and increase pain when 
  Problem: Safety - Adult  Goal: Free from fall injury  Outcome: Progressing     Problem: Pain  Goal: Verbalizes/displays adequate comfort level or baseline comfort level  Outcome: Progressing     Problem: ABCDS Injury Assessment  Goal: Absence of physical injury  Outcome: Progressing     Problem: Chronic Conditions and Co-morbidities  Goal: Patient's chronic conditions and co-morbidity symptoms are monitored and maintained or improved  Outcome: Progressing  Flowsheets  Taken 2/5/2024 2351 by Ashanti Lara RN  Care Plan - Patient's Chronic Conditions and Co-Morbidity Symptoms are Monitored and Maintained or Improved:   Monitor and assess patient's chronic conditions and comorbid symptoms for stability, deterioration, or improvement   Collaborate with multidisciplinary team to address chronic and comorbid conditions and prevent exacerbation or deterioration   Update acute care plan with appropriate goals if chronic or comorbid symptoms are exacerbated and prevent overall improvement and discharge  Taken 2/5/2024 2035 by Juliet Louie RN  Care Plan - Patient's Chronic Conditions and Co-Morbidity Symptoms are Monitored and Maintained or Improved:   Monitor and assess patient's chronic conditions and comorbid symptoms for stability, deterioration, or improvement   Collaborate with multidisciplinary team to address chronic and comorbid conditions and prevent exacerbation or deterioration     Problem: Discharge Planning  Goal: Discharge to home or other facility with appropriate resources  Outcome: Progressing  Flowsheets  Taken 2/5/2024 2351 by Ashanti Lara RN  Discharge to home or other facility with appropriate resources: Identify barriers to discharge with patient and caregiver  Taken 2/5/2024 2035 by Juliet Louie RN  Discharge to home or other facility with appropriate resources:   Identify barriers to discharge with patient and caregiver   Arrange for needed discharge resources and transportation as 
Assist   Passive Self ROM   Comments   Ankle Pumps 1 10  [x] [] [] []    Quad Sets/Glut Sets    [] [] [] [] Hold for 5 secs   Hamstring Sets   [] [] [] []    Short Arc Quads   [] [] [] []    Heel Slides   [] [] [] []    Straight Leg Raises   [] [] [] []    Hip Add   [] [] [] [] Hold for 5 secs, w/ pillow squeeze   Long Arc Quads   [] [] [] []    Seated Marching   [] [] [] []    Standing Marching   [] [] [] []       [] [] [] []        Pain:  Pain level pre-treatment: 4/10  Pain level post-treatment: 6/10   Pain Intervention(s): Rest,  Repositioning   Response to intervention: Nurse notified    Activity Tolerance:   Activity Tolerance: Patient limited by pain  Please refer to the flowsheet for vital signs taken during this treatment.  After treatment:   [] Patient left in no apparent distress sitting up in chair  [x] Patient left in no apparent distress in bed  [x] Call bell left within reach  [x] Nursing notified  [] Caregiver present  [] Bed alarm activated  [] SCDs applied      COMMUNICATION/EDUCATION:   Patient Education  Education Given To: Patient  Education Provided: Plan of Care;Transfer Training;Fall Prevention Strategies  Education Method: Verbal;Teach Back;Demonstration  Barriers to Learning: None  Education Outcome: Verbalized understanding;Continued education needed      Megan Aranda PT  Minutes: 13   
Sanket Farmer MD at [facility] CARDIAC SURGERY    IR TUNNELED CATHETER PLACEMENT GREATER THAN 5 YEARS  09/21/2022    IR TUNNELED CATHETER PLACEMENT GREATER THAN 5 YEARS 9/21/2022 [facility] RAD ANGIO IR    IR TUNNELED CATHETER PLACEMENT GREATER THAN 5 YEARS  09/21/2022    NERVE BLOCK Left 03/10/2023    NERVE BLOCK (DEFINE) performed by Sanket Farmer MD at [facility] CARDIAC SURGERY    OTHER SURGICAL HISTORY  01/18/2023    Exploratory laparotomy, sigmoid colectomy, and descending colostomy    SMALL INTESTINE SURGERY N/A 6/30/2023    EXPLORATORY LAPAROTOMY; LYSIS OF EXTENSIVE ADHESION, PARTIAL COLECTOMY, COLOSTOMY TAKEDOWN performed by Adelso Ga MD at [facility] MAIN OR    TUBAL LIGATION         Home Situation:  Social/Functional History  Lives With: Spouse  Type of Home: House  Home Layout: One level  Home Access: Stairs to enter with rails  Entrance Stairs - Number of Steps: 4  Entrance Stairs - Rails: Both  Ambulation Assistance: Independent (mod I RW)  Transfer Assistance: Independent  Critical Behavior:  Orientation  Overall Orientation Status: Within Normal Limits  Orientation Level: Oriented X4  Cognition  Overall Cognitive Status: WNL    Strength:    Strength: Generally decreased, functional    Tone & Sensation:   Tone: Normal  Sensation: Intact    Range Of Motion:  AROM: Generally decreased, functional  PROM: Generally decreased, functional    Functional Mobility:  Bed Mobility:     Bed Mobility Training  Bed Mobility Training: Yes  Rolling: Supervision  Supine to Sit: Stand-by assistance  Scooting: Stand-by assistance  Transfers:     Transfer Training  Transfer Training: Yes  Sit to Stand: Contact-guard assistance  Stand to Sit: Contact-guard assistance  Balance:               Balance  Sitting: Intact  Standing: Impaired  Standing - Static: Good  Standing - Dynamic: Fair          Ambulation/Gait Training:                       Gait  Overall Level of Assistance: Contact-guard assistance  Distance (ft): 15

## 2024-02-06 NOTE — PROGRESS NOTES
Hematology / Oncology Consult Note      Reason for Consultation:  Christy Donahue is a 73 y.o. female who I've been asked to consult for DVT while on apixaban.    Assessment:   DISTAL LE DVT, acute on chronic, non occlusive  The PVL readings on 12/14 and 02/02 look very similar -   Chronic edema  Markedly elevated uric acid  Pain over the left foot similar to prior gouty attacks  Suspect DVT occurred because of local factors despite possibly adequate systemic anticoagulation; OR are we looking at very much the same findings?    DVT LE 12/14/23 admission for hyperkalemia    Cardiomyopathy  Copd  CKD prior HD temporary for hyperkalemia    Plan:   DIAGNOSTIC:  Screen for acquired hypercoag states  Screen for hereditary hypercoag states later  Track cbc, lytes while on heparin  Track uric acid  Get vascular opinion (also has Hx of presumptive GIBleed admission at [facility])    THERAPEUTIC:  Anticoagulate with heparin  Treat gout/hyperuricemia  Serial PVL to demonstrate adquacy of anticoagulation  May need to consider IVC filter especially since ongoing comorbid conditions that predispose to hypercoag state unlikely to reverse  Get vascular opinion   Current kidney function probably precludes treatment with rivaroxaban, LMWH      Subjective:     HPI:  pt with complex hx of severe CHF, hypoxia, pulmonary htn, MICHAEL, chronic pain, prior DVT admitted for CHF exacerbation, sob, complaining of several days of left foot pain and swelling.  Was on apixaban for DVT in 12/2023 with good compliance but most recent PVL now shows acute non occlusive DVT left popliteal v and posterior tibial v as well as chronic non occlusive DVT left peroneal v  (PVL 12/14/23 shows acute non -occlusive DVT left popliteal v and peroneal v as well as cute on chronic non-occlusive DVT left posterior tibial v)      Past Medical History:   Diagnosis Date    Anemia     Chondromalacia of both patellae     Chronic heart failure with preserved ejection fraction 
      RENAL DAILY PROGRESS NOTE    Subjective:       Complaint:   Overnight events noted  no nausea, vomiting, chest pain  Breathing is better      IMPRESSION:   CKD stage 4 due to hypertension,diabetes.  Dyspnea due to copd plus CHF. Recurrent admissions more related to modifications in her diuretic regimen as OP which is leading to less diuresis and CHF admissions.  DVT  Hx GI bleed  Hypertension  Diastolic CHF  COPD  Secondary hyperparathyroidism  Access with AV Fistula from previous dialysis need.   PLAN:   Change bumex to po  Hold toprol due to low BP  I and O  Daily weights  On iv heparin currently for DVT. IVC filter is considered   If she still has admissions for CHF and this pattern continues then will need to start dialysis. She is agreeable to this.Once she is volume optimized then discharge on bumex 2 mg po bid with every other day zaroxylon with OP follow up with me.  Discussed with             Current Facility-Administered Medications   Medication Dose Route Frequency    bumetanide (BUMEX) tablet 2 mg  2 mg Oral BID    oxyCODONE-acetaminophen (PERCOCET) 5-325 MG per tablet 1 tablet  1 tablet Oral Q8H PRN    metOLazone (ZAROXOLYN) tablet 2.5 mg  2.5 mg Oral Every Other Day    heparin (porcine) injection 3,620 Units  40 Units/kg IntraVENous PRN    heparin 25,000 units in dextrose 5% 250 mL (premix) infusion  5-30 Units/kg/hr IntraVENous Continuous    heparin (porcine) injection 7,250 Units  80 Units/kg IntraVENous PRN    polyethylene glycol (GLYCOLAX) packet 17 g  17 g Oral Daily    guaiFENesin (MUCINEX) extended release tablet 600 mg  600 mg Oral BID    traMADol (ULTRAM) tablet 25 mg  25 mg Oral Q8H PRN    vitamin B-12 (CYANOCOBALAMIN) tablet 1,000 mcg  1,000 mcg Oral Daily    folic acid (FOLVITE) tablet 1 mg  1 mg Oral Daily    aspirin chewable tablet 81 mg  81 mg Oral Daily    atorvastatin (LIPITOR) tablet 40 mg  40 mg Oral Nightly    famotidine (PEPCID) tablet 20 mg  20 mg Oral Daily    
      RENAL DAILY PROGRESS NOTE    Subjective:       Complaint:   Overnight events noted  no nausea, vomiting, chest pain  Breathing is better      IMPRESSION:   CKD stage 4 due to hypertension,diabetes.  Dyspnea due to copd plus CHF. Recurrent admissions more related to modifications in her diuretic regimen as OP which is leading to less diuresis and CHF admissions.  DVT  Hx GI bleed  Hypertension  Diastolic CHF  COPD,on chronic home oxygen   Secondary hyperparathyroidism  Access with AV Fistula from previous dialysis need.   PLAN:   C/w current regimen  I and O  Daily weights  On iv heparin currently for DVT.    If she still has admissions for CHF and this pattern continues then will need to start dialysis. She is agreeable to this.  I wouldn't worry about cr fluctuations as focus should be on achieving dry status and if cr comes to point where it cannot handle it then she would be transitioned to dialysis. I will keep a close follow up with these as OP as I see her as OP.  Ok for discharge from my standpoint on bumex 2 mg po bid and zaroxylon 2.5 mg every other day.            Current Facility-Administered Medications   Medication Dose Route Frequency    bumetanide (BUMEX) tablet 2 mg  2 mg Oral BID    oxyCODONE-acetaminophen (PERCOCET) 5-325 MG per tablet 1 tablet  1 tablet Oral Q8H PRN    metOLazone (ZAROXOLYN) tablet 2.5 mg  2.5 mg Oral Every Other Day    heparin (porcine) injection 3,620 Units  40 Units/kg IntraVENous PRN    heparin 25,000 units in dextrose 5% 250 mL (premix) infusion  5-30 Units/kg/hr IntraVENous Continuous    heparin (porcine) injection 7,250 Units  80 Units/kg IntraVENous PRN    polyethylene glycol (GLYCOLAX) packet 17 g  17 g Oral Daily    guaiFENesin (MUCINEX) extended release tablet 600 mg  600 mg Oral BID    traMADol (ULTRAM) tablet 25 mg  25 mg Oral Q8H PRN    vitamin B-12 (CYANOCOBALAMIN) tablet 1,000 mcg  1,000 mcg Oral Daily    folic acid (FOLVITE) tablet 1 mg  1 mg Oral Daily    
      RENAL DAILY PROGRESS NOTE    Subjective:       Complaint:   Overnight events noted  no nausea, vomiting, chest pain  Breathing is better      IMPRESSION:   CKD stage 4 due to hypertension,diabetes.  Dyspnea due to copd plus CHF. Recurrent admissions more related to modifications in her diuretic regimen as OP which is leading to less diuresis and CHF admissions.  DVT  Hypertension  Diastolic CHF  COPD  Secondary hyperparathyroidism  Access with AV Fistula from previous dialysis need.   PLAN:   C/w current  diuresis.  Pls record output.  I And O  Daily weights  On iv heparin currently for DVT   If she still has admissions for CHF and this pattern continues then will need to start dialysis. She is agreeable to this.Once she is volume optimized then discharge on bumex 2 mg po bid with every other day zaroxylon with OP follow up with me.            Current Facility-Administered Medications   Medication Dose Route Frequency    bumetanide (BUMEX) injection 2 mg  2 mg IntraVENous Q12H    metOLazone (ZAROXOLYN) tablet 2.5 mg  2.5 mg Oral Every Other Day    heparin (porcine) injection 3,620 Units  40 Units/kg IntraVENous PRN    heparin 25,000 units in dextrose 5% 250 mL (premix) infusion  5-30 Units/kg/hr IntraVENous Continuous    heparin (porcine) injection 7,250 Units  80 Units/kg IntraVENous PRN    polyethylene glycol (GLYCOLAX) packet 17 g  17 g Oral Daily    guaiFENesin (MUCINEX) extended release tablet 600 mg  600 mg Oral BID    traMADol (ULTRAM) tablet 25 mg  25 mg Oral Q8H PRN    vitamin B-12 (CYANOCOBALAMIN) tablet 1,000 mcg  1,000 mcg Oral Daily    folic acid (FOLVITE) tablet 1 mg  1 mg Oral Daily    aspirin chewable tablet 81 mg  81 mg Oral Daily    atorvastatin (LIPITOR) tablet 40 mg  40 mg Oral Nightly    famotidine (PEPCID) tablet 20 mg  20 mg Oral Daily    gabapentin (NEURONTIN) capsule 400 mg  400 mg Oral QPM    ipratropium 0.5 mg-albuterol 2.5 mg (DUONEB) nebulizer solution 1 Dose  1 Dose Inhalation Q6H 
      RENAL DAILY PROGRESS NOTE    Subjective:       Complaint:   Overnight events noted  no nausea, vomiting, chest pain  Breathing is better      IMPRESSION:   CKD stage 4 due to hypertension,diabetes.  Dyspnea due to copd plus CHF. Recurrent admissions more related to modifications in her diuretic regimen as OP which is leading to less diuresis and CHF admissions.  Hypertension  Diastolic CHF  COPD  Secondary hyperparathyroidism  Access with AV Fistula from previous dialysis need.   PLAN:   Increase Bumex to 2 mg iv bid  Add zaroxolyn every other day  I And O  Daily weights  Discussed at length and told her about watching her on above regimen as OP. If she still has admissions for CHF and this pattern continues then will need to start dialysis. She is agreeable to this.Once she is volume optimized then discharge on bumex 2 mg po bid with every other day zaroxylon with OP follow up with me.            Current Facility-Administered Medications   Medication Dose Route Frequency    bumetanide (BUMEX) injection 2 mg  2 mg IntraVENous Q12H    metOLazone (ZAROXOLYN) tablet 2.5 mg  2.5 mg Oral Every Other Day    predniSONE (DELTASONE) tablet 40 mg  40 mg Oral Daily    polyethylene glycol (GLYCOLAX) packet 17 g  17 g Oral Daily    guaiFENesin (MUCINEX) extended release tablet 600 mg  600 mg Oral BID    traMADol (ULTRAM) tablet 25 mg  25 mg Oral Q8H PRN    vitamin B-12 (CYANOCOBALAMIN) tablet 1,000 mcg  1,000 mcg Oral Daily    folic acid (FOLVITE) tablet 1 mg  1 mg Oral Daily    apixaban (ELIQUIS) tablet 5 mg  5 mg Oral BID    aspirin chewable tablet 81 mg  81 mg Oral Daily    atorvastatin (LIPITOR) tablet 40 mg  40 mg Oral Nightly    famotidine (PEPCID) tablet 20 mg  20 mg Oral Daily    gabapentin (NEURONTIN) capsule 400 mg  400 mg Oral QPM    ipratropium 0.5 mg-albuterol 2.5 mg (DUONEB) nebulizer solution 1 Dose  1 Dose Inhalation Q6H PRN    metoprolol succinate (TOPROL XL) extended release tablet 25 mg  25 mg Oral Daily 
   Hematology / Oncology Progress Note  [facility]      Patient ID:  Christy Donahue  73 y.o.  1950    Admit Date: 2024    Assessment:     Principal Problem:    Acute on chronic combined systolic and diastolic CHF (congestive heart failure) (HCC)  Active Problems:    Moderate mitral valve regurgitation    Exertional dyspnea    Anemia, macrocytic    CHF (congestive heart failure), NYHA class I, acute on chronic, combined (HCC)    Hypoxia    Severe obesity (BMI 35.0-35.9 with comorbidity) (HCC)    COPD, mild (HCC)    Cardiomyopathy (HCC)    Left foot pain    Acute gout of left foot  Resolved Problems:    * No resolved hospital problems. *    DISTAL LE DVT, acute on chronic, non occlusive  The PVL readings on  and  look very similar -   Chronic edema  Markedly elevated uric acid  Pain over the left foot similar to prior gouty attacks  Suspect DVT occurred because of local factors despite possibly adequate systemic anticoagulation; OR are we looking at very much the same findings?     DVT LE 23 admission for hyperkalemia    Uric acid is still rising     Plan:     Track cbc, lytes, uric acid    Consider repeat PVL in 1-2 days to assess adequacy of anticoagulation    Please consult Vascular for opinion:  Is this really a new DVT?  Will pt benefit from IVC filter?    Subjective:   Pain in left leg persists.  Says she feels its swollen    Objective:     BP (!) 119/54   Pulse 78   Temp 98.2 °F (36.8 °C) (Oral)   Resp 18   Ht 1.626 m (5' 4.02\")   Wt 90.6 kg (199 lb 11.8 oz)   SpO2 100%   BMI 34.27 kg/m²           Temp (24hrs), Av.4 °F (36.9 °C), Min:98 °F (36.7 °C), Max:98.8 °F (37.1 °C)        Intake/Output Summary (Last 24 hours) at 2024 0836  Last data filed at 2024  Gross per 24 hour   Intake 781.98 ml   Output 2501 ml   Net -1719.02 ml       Review of Systems:   Constitutional:  No Fever; No chills; No weight loss    Skin:  No rash; No itching   HEENT:  No 
  [facility]  Progress Note    Patient: Christy Donahue Age: 73 y.o. : 1950 MR#: [id number] SSN: [id number]  Date/Time: 2024    Subjective:     Still with shortness of breath.  She reports she has been wheezing.  B.l cramping hand pain started today, difficult to straighten out her fingers. Denies any episode of chest pain.  Primary nephrologist is Dr. Fowler.         Assessment/Plan:     1. CHF exacerbation.  Continue  Bumex.  Cardiology consult.  2. COPD with AE. Has not followed up in pulmonology clinic. Steroids. BD. Pulm consult.   3. DM2. Lantus / ssi /ADA diet.  Check A1c.  4. CKD stage III.  Monitor with diuresis.  Consider nephrology consult.  5. hx DVT/PE on Eliquis  6. hx GI bleed in the past  7. Obesity Body mass index is 34.28 kg/m².  8. Hypertension, suboptimal control.   9. Dyslipidemia on statin.  10. Lactic acidosis.  Improving  11. Anemia macrocytic.  Check B12, folate.  12. Cad s/p S/P 2 MI and s\STENT - cardiac cath  revealed patent stent to proximal LAD.   13. Former tobacco  14. Suspected MICHAEL  15. Hx gout  16.  DVT prophylaxis  17.  Full code.  PT OT.      Additional Notes:      Case discussed with:  [x]patient  []family  [x]nursing  [x]case management   [] discussed on IDT.   DVT Prophylaxis:  []Lovenox  []Hep SQ  []SCDs  []Coumadin   []On Heparin gtt   [x] noac    Objective:   VS: BP (!) 150/76   Pulse (!) 104   Temp 99.5 °F (37.5 °C) (Oral)   Resp 30   Ht 1.626 m (5' 4\")   Wt 90.6 kg (199 lb 11.8 oz)   SpO2 99%   BMI 34.28 kg/m²    Tmax/24hrs: Temp (24hrs), Av.9 °F (37.2 °C), Min:98.3 °F (36.8 °C), Max:99.5 °F (37.5 °C)    Input/Output: No intake or output data in the 24 hours ending 24 1208    General: Alert, cooperative, no distress, appears stated age.  Head:   Normocephalic, without obvious abnormality, atraumatic.  Eyes:   Conjunctivae/corneas clear. PERRL, EOMs intact.  Nose:   Nares normal. No drainage or sinus 
  [facility]  Progress Note    Patient: Christy Donahue Age: 73 y.o. : 1950 MR#: [id number] SSN: [id number]  Date/Time: 2024    Subjective:     K 5.8.     Creatinine up slightly to 2.15.     S/e at bedside, she is found sitting up on edge of bed eating lunch and talking on cell w/ . She endorses sputum is stuck in her upper chest and she cannot expectorate. Overall feels better than yesterday, but not at baseline respiratory status. Worked with PT and felt very winded. Appetite good. No chest pain. No family at bedside.       Assessment/Plan:     1. CHF exacerbation.  Continue Bumex, switch to p.o.  Cardiology input appreciated.  2. COPD with AE. Has not followed up in pulmonology clinic. Steroids. BD.  Bronchial hygiene protocol.  ICS.  Mucinex.  Pulm input appreciated.  3. DM2. Lantus / ssi /ADA diet. A1c 5.0   4. CKD stage III.  Monitor with diuresis.  Consider nephrology consult.  5. hx DVT/PE on Eliquis  6. hx GI bleed in the past  7. Obesity Body mass index is 34.28 kg/m².  8. Hypertension, suboptimal control.   9. Dyslipidemia on statin.  10.  Hypokalemia.  Lokelma.  11. Anemia macrocytic.  Supplement B12, folate.  12. Cad s/p S/P 2 MI and s\STENT - cardiac cath 2019 revealed patent stent to proximal LAD.   13. Former tobacco  14. Suspected MICHAEL  15. Hx gout  16.  DVT prophylaxis  17.  Full code.  PT recs HH.  OT no recs.      Additional Notes:      Case discussed with:  [x]patient  []family  [x]nursing  []case management   [] discussed on IDT.   DVT Prophylaxis:  []Lovenox  []Hep SQ  []SCDs  []Coumadin   []On Heparin gtt   [x] noac    Objective:   VS: /61   Pulse 79   Temp 97.1 °F (36.2 °C) (Oral)   Resp 20   Ht 1.626 m (5' 4\")   Wt 90.6 kg (199 lb 11.8 oz)   SpO2 99%   BMI 34.28 kg/m²    Tmax/24hrs: Temp (24hrs), Av.2 °F (36.8 °C), Min:97.1 °F (36.2 °C), Max:99.2 °F (37.3 °C)    Input/Output:   Intake/Output Summary (Last 24 hours) 
  [facility] Hospitalist Group  Progress Note    Patient: Christy Donahue Age: 73 y.o. : 1950 MR#: [id number] SSN: [id number]  Date/Time: 2024    Subjective:     K 5.4.     Creatinine 2.3.     Xaroxylyn added, and bumex increased per nephro.     Patient seen and examined at bedside, she states her foot feels about the same.  Overall, she feels about the same.      Assessment/Plan:     1. CHF exacerbation. Cardiology, nephrology input appreciated.  Continue IV Bumex.  2. COPD with AE. Has not followed up in pulmonology clinic. Steroids. BD.  Bronchial hygiene protocol.  ICS.  Mucinex.  Pulm input appreciated.  3. DM2. Lantus / ssi /ADA diet. A1c 5.0   4. CKD stage III.  Monitor with diuresis. nephrology input appreciated.  5. Acute non-occlusive deep vein thrombosis in the left popliteal vein and posterior tibial vein, and hx DVT/PE, on eliquis. Discontinue eliquis --> heparin gtt. Hematology consult.   6. hx GI bleed in the past  7. Obesity Body mass index is 34.27 kg/m².  8. Hypertension, improving control.   9. Dyslipidemia on statin.  10.  Hyperkalemia, improving.  Lokelma on hold. Nephro input appreciated.  11. Anemia macrocytic.  Supplement B12, folate.  12. Cad s/p S/P 2 MI and STENT - cardiac cath 2019 revealed patent stent to proximal LAD.   13. Former tobacco  14. Suspected MICHAEL  15.  DVT prophylaxis  16.  Full code.  PT recs HH.  OT no recs. I discussed the case w/ Dr. Lynne, Dr. Fowler, and Dr. Fuentes.       Additional Notes:  time spent 55 minutes.     Case discussed with:  [x]patient  []family  [x]nursing  [x]case management   [] discussed on IDT.   DVT Prophylaxis:  []Lovenox  []Hep SQ  []SCDs  []Coumadin   [x]On Heparin gtt   [] noac    Objective:   VS: /86   Pulse 84   Temp 98.6 °F (37 °C) (Oral)   Resp 16   Ht 1.626 m (5' 4.02\")   Wt 90.6 kg (199 lb 11.8 oz)   SpO2 99%   BMI 34.27 kg/m²    Tmax/24hrs: Temp (24hrs), Av.9 °F (37.2 °C), Min:97.9 °F 
  [facility] Hospitalist Group  Progress Note    Patient: Christy Donahue Age: 73 y.o. : 1950 MR#: [id number] SSN: [id number]  Date/Time: 2024    Subjective:     K 4.2.     Creatinine increased to 2.29.     Uric acid 12.1.    Patient received IV Lasix this morning.  Midday dose of Bumex held for low blood pressure.    Patient seen and examined at bedside, she reports she overall does not feel well today.  Her left foot still hurts, but less than yesterday.  She is experiencing mental fog.  Denies funny taste in her mouth.  She states Dr. Fowler took her off dialysis in the past, for this reason she wants to discuss current recommendation for dialysis with him.  Patient states, \"I do not want to go back on the machine.\"  No family at bedside.       Assessment/Plan:     1. CHF exacerbation. Cardiology, nephrology input appreciated.  Continue IV Bumex.  2. COPD with AE. Has not followed up in pulmonology clinic. Steroids. BD.  Bronchial hygiene protocol.  ICS.  Mucinex.  Pulm input appreciated.  3. DM2. Lantus / ssi /ADA diet. A1c 5.0   4. CKD stage III.  Monitor with diuresis. nephrology input appreciated.  5. hx DVT/PE on Eliquis  6. hx GI bleed in the past  7. Obesity Body mass index is 34.27 kg/m².  8. Hypertension, improving control.   9. Dyslipidemia on statin.  10.  Hyperkalemia, improving.  Lokelma. Nephro input appreciated.  11. Anemia macrocytic.  Supplement B12, folate.  12. Cad s/p S/P 2 MI and s\STENT - cardiac cath 2019 revealed patent stent to proximal LAD.   13. Former tobacco  14. Suspected MICHAEL  15. Gout flare. Short course steroids. Monitor sugars. Podiatry consult.   16.  DVT prophylaxis  17.  Full code.  PT recs HH.  OT no recs. I discussed the case w/ Dr. Márquez.       Additional Notes:      Case discussed with:  [x]patient  []family  [x]nursing  [x]case management   [] discussed on IDT.   DVT Prophylaxis:  []Lovenox  []Hep SQ  []SCDs  []Coumadin   []On Heparin gtt 
  [facility] Hospitalist Group  Progress Note    Patient: Christy Donahue Age: 73 y.o. : 1950 MR#: [id number] SSN: [id number]  Date/Time: 2024    Subjective:     K 4.8.     Creatinine increased to 2.2.     Patient got lasix 40 mg iv x1 this am.     Patient s/e at bedside, she is still dyspneic on exertion but less than yesterday. Inquires as to why she's here, and states she was unaware she has CHF. States she has not had CHF education. Reports acute onset of left foot pain, started this am. Denies chest pain, abdominal pain. Reports constipation, last stool 2 days ago. No family at bedside.       Assessment/Plan:     1. CHF exacerbation. Bumex held given dhruv. Cardiology input appreciated.  2. COPD with AE. Has not followed up in pulmonology clinic. Steroids. BD.  Bronchial hygiene protocol.  ICS.  Mucinex.  Pulm input appreciated.  3. DM2. Lantus / ssi /ADA diet. A1c 5.0   4. CKD stage III.  Monitor with diuresis. nephrology consult.  5. hx DVT/PE on Eliquis  6. hx GI bleed in the past  7. Obesity Body mass index is 34.27 kg/m².  8. Hypertension, improving control.   9. Dyslipidemia on statin.  10.  Hyperkalemia, improving.  Lokelma. Nephro consult.   11. Anemia macrocytic.  Supplement B12, folate.  12. Cad s/p S/P 2 MI and s\STENT - cardiac cath 2019 revealed patent stent to proximal LAD.   13. Former tobacco  14. Suspected MICHAEL  15. Hx gout, now w/ left foot pain. Check uric acid level. XR left foot. Podiatry consult.   16.  DVT prophylaxis  17.  Full code.  PT recs HH.  OT no recs.      Additional Notes:      Case discussed with:  [x]patient  []family  [x]nursing  [x]case management   [] discussed on IDT.   DVT Prophylaxis:  []Lovenox  []Hep SQ  []SCDs  []Coumadin   []On Heparin gtt   [x] noac    Objective:   VS: /79   Pulse 90   Temp 98.1 °F (36.7 °C) (Oral)   Resp 16   Ht 1.626 m (5' 4.02\")   Wt 90.6 kg (199 lb 11.8 oz)   SpO2 98%   BMI 34.27 kg/m²    Tmax/24hrs: 
  [facility] Hospitalist [facility]  Progress Note    Patient: Christy Donahue Age: 73 y.o. : 1950 MR#: [id number] SSN: [id number]  Date/Time: 2024    Subjective:     Patient seen examined at bedside, she states she feels about the same.  No chest pain, shortness of breath.  Foot feels about the same.  No family at bedside.      Assessment/Plan:     1. CHF exacerbation. Cardiology, nephrology input appreciated.  Continue IV Bumex.  2. COPD with AE. Has not followed up in pulmonology clinic. Steroids. BD.  Bronchial hygiene protocol.  ICS.  Mucinex.  Pulm input appreciated.  3. DM2. Lantus / ssi /ADA diet. A1c 5.0   4. CKD stage III.  Monitor with diuresis. nephrology input appreciated.  5. Acute non-occlusive deep vein thrombosis in the left popliteal vein and posterior tibial vein, and hx DVT/PE, on eliquis. Discontinued eliquis --> heparin gtt. Hematology input appreciated.    6. hx GI bleed in the past  7. Obesity Body mass index is 34.27 kg/m².  8. Hypertension, improving control.   9. Dyslipidemia on statin.  10.  Hyperkalemia, improving.  Lokelma on hold. Nephro input appreciated.  11. Anemia macrocytic.  Supplement B12, folate.  12. Cad s/p S/P 2 MI and STENT - cardiac cath 2019 revealed patent stent to proximal LAD.   13. Former tobacco  14. Suspected MICHAEL  15.  DVT prophylaxis  16.  Full code.  PT recs HH.  OT no recs.      Additional Notes:  time spent 35 minutes.     Case discussed with:  [x]patient  []family  [x]nursing  [x]case management   [] discussed on IDT.   DVT Prophylaxis:  []Lovenox  []Hep SQ  []SCDs  []Coumadin   [x]On Heparin gtt   [] noac    Objective:   VS: /61   Pulse 83   Temp 98 °F (36.7 °C) (Oral)   Resp 18   Ht 1.626 m (5' 4.02\")   Wt 90.6 kg (199 lb 11.8 oz)   SpO2 99%   BMI 34.27 kg/m²    Tmax/24hrs: Temp (24hrs), Av.1 °F (36.7 °C), Min:97.8 °F (36.6 °C), Max:98.3 °F (36.8 °C)    Input/Output:   Intake/Output Summary (Last 24 hours) at 
Admit Date: 2024    POD * No surgery found *    Procedure:  * No surgery found *    Subjective:   Patient seen, chart reviewed, all acute events noted.  Patient is awake alert and answers all questions appropriatly.  Sitting at bedside feeling much more comfortable than on admission.  Still with some leg discomfort but improved over the last 24 hours.  Reports leg discomfort  1 out of 10 presently.  She denies any chest pain or shortness of breath.  Denies any dyspnea on exertion.  Denies any nausea or vomiting and states that her appetite is fine.  Patient is complaining of some mild  hand numbness on the fourth fifth digits of her right hand.  Patient has a AV fistula in the right upper extremity.  Denies any weakness but just numbness.  Patient is not on dialysis presently, and is not currently using the fistula.  States she has a fistula created , just in  case.  Patient denies any fever or chills.  As stated overall feeling better.    Objective:     Blood pressure 120/72, pulse 79, temperature 97.4 °F (36.3 °C), temperature source Oral, resp. rate 18, height 1.626 m (5' 4.02\"), weight 90.6 kg (199 lb 11.8 oz), SpO2 98 %.    Temp (24hrs), Av.2 °F (36.8 °C), Min:97.4 °F (36.3 °C), Max:99.3 °F (37.4 °C)      Physical Exam:    General: Alert, cooperative, no distress, appears stated age.  Answers all questions appropriate.  Moves all extremities to command.  Mild left foot discomfort but pain adequately controlled.    Head:   Normocephalic, without obvious abnormality, atraumatic.  Eyes:   Conjunctivae/corneas clear. PERRL, EOMs intact.  Nose:   Nares normal. No drainage or sinus tenderness.  Neck:   Supple, symmetrical, trachea midline, no adenopathy, thyroid: no enlargement, no carotid bruit and + JVD.  Lungs: Slightly decreased breath sounds noted at both bases, adequate inspiratory effort noted.  No wheeze.  No crackles.  Heart:  Regular rate and rhythm, S1, S2 normal.  No ectopy noted.  Abdomen:Soft, 
Advance Care Planning     Advance Care Planning Inpatient Note  Spiritual Care Department    Today's Date: 1/29/2024  Unit: [facility] 2S TELEMETRY    Received request from admission screening.  Upon review of chart and communication with care team, patient's decision making abilities are not in question.. Patient was/were present in the room during visit.      Health Care Decision Makers:       Primary Decision Maker: Lance Donahue - Spouse - [phone]  Summary:  Verified Documents  Verified Healthcare Decision Maker      Advance Care Planning Documents (Patient Wishes):  Healthcare Power of /Advance Directive Appointment of Health Care Agent  Living Will/Advance Directive     Assessment:     01/29/24 1446   Encounter Summary   Encounter Overview/Reason  Advance Care Planning;Initial Encounter   Service Provided For: Patient   Referral/Consult From: Multi-disciplinary team   Support System Family members   Last Encounter  01/29/24  (IV-SA-KP)   Complexity of Encounter Moderate   Begin Time 1109   End Time  1117   Total Time Calculated 8 min   Spiritual/Emotional needs   Type Spiritual Support   Advance Care Planning   Type Care Preferences Addressed  (AMD ON File)   Assessment/Intervention/Outcome   Assessment Coping;Hopeful   Intervention Active listening   Outcome Encouraged   Plan and Referrals   Plan/Referrals Continue to visit, (comment)       Interventions:  Reviewed but did not complete ACP document    Care Preferences Communicated:   No    Outcomes/Plan:  ACP Discussion: Completed    Electronically signed by DOYLE Huerta on 1/29/2024 at 2:47 PM            
Bedside and Verbal shift change report given to Linda KNOX (oncoming nurse) by Natasha KNOX (offgoing nurse). Report included the following information Nurse Handoff Report, Index, Intake/Output, MAR, and Recent Results. Patient resting in bed. Call bell and bedside commode in reach.    
[facility] - [facility]    Admit Date: 1/28/2024  Attending Cardiologist: Dr. 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   -Hyperkalemia, recurrent. Resolved.   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   -CAD.  s/p prox LAD FELIPE 5/20/18, complicated by acute IST after initial PCI, requiring emergency intubation for pulmonary edema and repeat cardiac cath and additional PCI/FELIPE to prox LAD.    s/p cardiac cath 8/9/2023 with non obstructive CAD (Dr. King)   -Hx Cardiomyopathy with recovered LVEF, EF variable and as low as 35% since 2018.   EF 50-55% 06/2022.  EF 08/2023 35-40%    EF 12/2023 EF 40-45%  -Mitral Regurgitation, varying between mild-moderate since 2018 on TTE.   -Acute non-occlusive DVT in L popliteal vein and posterior tibial vein by LLE duplex study 2/2/2024  -HTN, improved.   -HLD, on statin.   -DM  -CKD stage IV, baseline Scr ~2.5, previously on HD.   -COPD/MICHAEL, nightly cpap.   -Hx tobacco abuse   -Obesity.       Primary cardiologist is Dr. Lentz    Plan:     -Continue ASA, Lipitor, Toprol.  -GDMT for CHF limited secondary to renal function.  -Volume management per nephrology team, appreciate assistance, continued on Metolazone, IV Bumex.  -Hematology following, appreciate assistance.  Continued on Heparin infusion for DVT.  -No new recommendations from cardiac standpoint.    Subjective:     No new complaints.     Objective:      Patient Vitals for the past 8 hrs:   Temp Pulse Resp BP SpO2   02/04/24 0754 -- 72 16 -- --   02/04/24 0731 98.3 °F (36.8 °C) 78 16 111/70 99 %   02/04/24 0400 98.2 °F (36.8 °C) 77 18 106/63 97 %         No data found.             Current Facility-Administered Medications   Medication Dose Route Frequency    oxyCODONE-acetaminophen (PERCOCET) 5-325 MG per tablet 1 tablet 
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   -Hyperkalemia, recurrent. Resolved.   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   -CAD.  s/p prox LAD FELIPE 5/20/18, complicated by acute IST after initial PCI, requiring emergency intubation for pulmonary edema and repeat cardiac cath and additional PCI/FELIPE to prox LAD.    s/p cardiac cath 8/9/2023 with non obstructive CAD (Dr. King)   -Hx Cardiomyopathy with recovered LVEF, EF variable and as low as 35% since 2018.   EF 50-55% 06/2022.  EF 08/2023 35-40%    EF 12/2023 EF 40-45%  -Mitral Regurgitation, varying between mild-moderate since 2018 on TTE.   -HTN, improved.   -HLD, on statin.   -DM  -CKD stage IV, baseline Scr ~2.5, previously on HD.   -COPD/MICHAEL, nightly cpap.   -HX DVT/PE, on Eliquis.  -Hx tobacco abuse   -Obesity.       Primary cardiologist is Dr. Lentz    Plan:     -Volume mgmt per nephrology, appreciate assistance, currently receiving IV Bumex 2 mg BID.   -GDMT, currently limited by her renal function and soft blood pressures. Continue Toprol XL only for now. Consider adding hydralazine + long acting nitrate pending BP trends prior to discharge.   -Eliquis for Hx DVT/PE. Monitor H/H.   -Conitnue ASA, statin, BB for Hx CAD.    -Encourage ICS. Increase activity as tolerated.     Subjective:     SOB slowly improving but still present.     Objective:      Patient Vitals for the past 8 hrs:   Temp Pulse Resp BP SpO2   02/02/24 0345 99.3 °F (37.4 °C) 82 18 97/76 98 %           Patient Vitals for the past 96 hrs:   Weight   01/29/24 1200 90.6 kg (199 lb 11.8 oz)                    No intake or output data in the 24 hours ending 02/02/24 0845      Physical Exam:  General:  alert, appears stated age, and cooperative  Neck:  
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   -Hyperkalemia, recurrent. Resolved.   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   -CAD.  s/p prox LAD FELIPE 5/20/18, complicated by acute IST after initial PCI, requiring emergency intubation for pulmonary edema and repeat cardiac cath and additional PCI/FELIPE to prox LAD.    s/p cardiac cath 8/9/2023 with non obstructive CAD (Dr. King)   -Hx Cardiomyopathy with recovered LVEF, EF variable and as low as 35% since 2018.   EF 50-55% 06/2022.  EF 08/2023 35-40%    EF 12/2023 EF 40-45%  -Mitral Regurgitation, varying between mild-moderate since 2018 on TTE.   -HTN, improved.   -HLD, on statin.   -DM  -CKD stage IV, baseline Scr ~2.5, previously on HD.   -COPD/MICHAEL, nightly cpap.   -HX DVT/PE, on Eliquis.  -Hx tobacco abuse   -Obesity.       Primary cardiologist is Dr. Lentz    Plan:     -Nephrology consult for assistance with diuretic mgmt with prior hx of HD is pending, appreciate assistance. Continue IV lasix 40 mg daily for now, pending further recommendations.   -GDMT, currently limited by her renal function and soft blood pressures. Continue Toprol XL. Consider adding hydralazine + long acting nitrate pending BP trends prior to discharge.   -Eliquis for Hx DVT/PE. Monitor H/H.   -Conitnue ASA, statin, BB for Hx CAD.   -CHF diagnosis and diet/fluid education reinforced, again.   -Podiatry consult pending for left foot pain.   -Encourage ICS.     Subjective:     Still c/o orthopnea. Also with left foot pain.     Objective:      Patient Vitals for the past 8 hrs:   Temp Pulse Resp BP   02/01/24 0500 -- 79 -- --   02/01/24 0400 98.7 °F (37.1 °C) 88 18 103/64   02/01/24 0300 -- 90 -- --   02/01/24 0100 -- 94 -- --           Patient Vitals for the past 
Clarifiying message sent to doctor will wait for follow up. Noticed order to wrap patients foot with ace wrap. However, order states to wrap right foot. Patients left foot is the one in pain. Just wanting to confirm which foot we are wrapping. Also is it okay to wrap w/ patients new found DVT?    Awaiting follow up from Podiatry.  
Comprehensive Nutrition Assessment    Type and Reason for Visit:  Initial, Positive Nutrition Screen    Nutrition Recommendations/Plan:   Continue Current Diet.   Continue to monitor tolerance of PO, weight, labs, and plan of care during admission.         Malnutrition Assessment:  Malnutrition Status:  At risk for malnutrition (Comment) (CHF) (01/30/24 1257)      Nutrition History and Allergies:   PMHx: COPD, CKD stage III, hypertension, diabetes mellitus type 2, chronic systolic heart failure and obesity. Pt reports good appetite, UBW of 215 lb x 7 months and weight loss d/t fluid shift. Wt hx: 199 lb (01/29/24), 192 lb (12/23/23), weight gain of 7 lb (3.6%) x 1 month, 197 lb (10/03/23), weight stable x 3 months, 201 lb (07/17/23), weight stable x 6 months, 212 lb (01/24/23), weight loss of 13 lb (6.1%) x 1 year. Pt with non-significant weight loss. Will continue to follow weight trends. NKFA.     Nutrition Assessment:    Pt presents with complaints of shortness of breath. Pt admitted for acute on chronic combined systolic and diastolic CHF (congestive heart failure. Positive nutrition screen noted, MST: wt loss (14-23#) and denies decreased appetite. Pt reports fluctuating intake d/t dislike of certain foods in house. (2) meals documented in flowsheets, pt consuming 51-75% of meals. If pt consumes >50% of meals nutrient needs will be met. Will continue to monitor intake.    Nutrition Related Findings:    Last BM (including prior to admit): 01/30/24. Edema: Right lower extremity, Left lower extremity. Edema Generalized: +1. Pertinent Meds: Lipitor, bumex, pepcid, lantus, humalog, mag-ox, lokelma. Pertinent Labs: Na 140 (wnl), K 5.8 (H), BUN 21 (H), Cr 2.15 (H), GFR 24 (L), Mag 2.1 (wnl), Phos 4.0 (wnl),  POC Glucose  mg/dl x 24 hrs, A1c 5.0 (wnl). Wound Type: None       Current Nutrition Intake & Therapies:    Average Meal Intake: 51-75%  Average Supplements Intake: None Ordered  ADULT DIET; Regular; 4 carb 
Hematology Oncology    Thanks for consult  Full note to follow    Suspect dvt due to left leg edema setting of hyperuricemia and gout like sx  Hence local factors overcome systemic ac at affected site    Continue heparin  Screen for acquired hypercoag  When more stable reasonable to screen for hereditary hypercoag also    Ramiro Fuentes MD  
Lab was called at 1415 to remind phlebotomist of upcoming PTT due at 1445, as Pt is on Heparin gtt. Phlebotomist Sophia stated someone would be up to collect. At 1530 upon chart review, lab  not collected. Phlebotomy was called again and Sophia stated someone would be up to collect. Will continue to check for PTT level to adjust heparin gtt.   
[facility]  Progress Note    Patient: Christy Donahue Age: 73 y.o. : 1950 MR#: [id number] SSN: [id number]  Date/Time: 2024     Subjective: Patient sitting in the bed, feels much better.  Shortness of breath improving, almost at baseline.  Per patient she has been taking Eliquis but not consistently, per  she does miss quite a few doses at home.     Assessment/Plan:   Acute on chronic systolic heart failure exacerbation  COPD, no acute exacerbation  Diabetes mellitus type 2  CKD stage III  Acute nonocclusive DVT  History of DVT/PE on anticoagulation  History of GI bleed in the past  Obesity  Hypertension  Dyslipidemia     Plan  Discussed with hematology recommended vascular input for IVC filter  Vascular surgery recommended not a candidate for IVC filter but if Eliquis failure then consider Coumadin  Since patient was not consistently taking Eliquis, I do not think it is Eliquis failure.  I did discuss with hematology they also did not think patient is Eliquis failure recommended to continue Eliquis since patient was not taking consistently  Nephrology input noted, recommended to transition to p.o. Bumex  Will continue IV heparin for now, transition to Eliquis at discharge  Continue Lantus and SSI, monitor blood sugars  Agree with holding metoprolol given blood pressure on the lower side  PT/OT eval and treatment, recommend SNF versus home health care  Continue oxygen supplementation and bronchodilators as needed    Discussed with patient and also with her  over the phone and explained in detail about my above plan care.  Discussed about continuing Eliquis since she was not taking it consistently, advised for more medication compliance and explained about risk of recurrent DVTs and PEs.  Patient got very emotional during my discussion about noncompliance, she started crying.   was on the phone with the patient and also RN at the bedside.  I did 
[facility]  Pulmonary, Critical Care, and Sleep Medicine    F/U Pulmonary Consultation    Name: Christy Donahue MRN: [id number]   : 1950 Hospital: [facility]   Date: 2024          This patient has been seen and evaluated at the request of Dr. Noel for COPD    IMPRESSION:   Acute hypoxia:  Etiology most likely driven by acute pulmonary edema given hx of severe CHF with systolic and diastolic dysfunction and severe valvular cardiomyopathy.  Patient's level of COPD is mild.  Acute pulmonary edema:  Seen on CXR from admission  CHF, systolic and diastolic dysfunction with valvular cardiomyopathy: Patient had previous diastolic dysfunction, now with reduced LVEF of 35-40% with LV dilation.  Moderate to severe mitral valve regurgitation.  Pt has mutliple hospitalizations to [facility] and [facility] for CHF exacerbation with pulmonary edema  Wheezing: Likely cardiac wheeze in the setting of pulmonary edema, unlikely secondary to COPD.  Patient also has dysphonia, may have VCD versus redundant soft tissue and upper airway  Mild to moderate COPD: Patient is a former smoker, reports shortness of breath with exertion, although this I suspect may be more cardiac in nature  Pulmonary HTN:  Mainly WHO group 2 in nature, some contribution from WHO group 3 (suspect MICHAEL and mild COPD)  Deconditioning/debility  MICHAEL: Previously followed by sleep medicine-Dr. Dillon.  Patient then did not follow-up  Severe obesity: Body mass index is 34.27 kg/m².   Chronic pain syndrome  History of tobacco abuse: Quit smoking in 2019, likely 50+ pack year smoking history  DVT:  Acute nonocclusive thrombus in the left popliteal and peroneal veins as well as nonocclusive acute on chronic deep vein thrombosis in left posterior tibial vein seen on LE duplex from 2023     Patient Active Problem List   Diagnosis    Environmental allergies    MICHAEL on CPAP    Chronic pain syndrome    Osteoarthritis of both knees    
PT attempt. Patient declines to participate in mobility due to increased foot pain today. Will continue to follow. Thank you.   Sahara Funes PT, DPT  
Pt c/o sharp chest pain this AM at 0850. Dr. Noel paged to make aware. VSS, with BP of 116/74, HR 95, and SpO2 99%. Hematology Dr. At bedside at that time as well assessing  Pt. EKG ordered and performed (showing SR) and troponin level ordered. Pt was found laying flat in bed, towards foot end of bed prior to complaint, in which Pt stated she was comfortable and did not want to slide up in bed for better positioning. RN explained to Pt as well that laying flat could make acid reflux as well, causing discomfort. Pt assisted with repositioning. Pt given PRN pain medication to assist with pain.   
Push IV Bumex back to later this evening due to patient already receiving 40mg of lasix and BP low will notify MD.  
Reviewed Carli studies are similar to those in December.  No pathology on leg x-ray's.  Defer to medicine on  non occlusive DVT.  Foot pain most likely degenerative arthritic .  Ace and postop shoe needed.        
Verbal and written discharge instructions given to patient. Questions asked and answered. No further needs identified.  
[facility] Renal Dosing Services    Pharmacist Renal Dosing Note        Previous Regimen Pepcid 20 mg BID   Serum Creatinine No results found for: \"RACIEL\", \"CREA\", \"CREAPOC\"   Creatinine Clearance Estimated Creatinine Clearance: 27 mL/min (A) (based on SCr of 2.06 mg/dL (H)).   BUN Lab Results   Component Value Date/Time    BUN 18 01/28/2024 10:40 PM           The following medication: Pepcid was automatically dose-adjusted per [facility] P&T Committee Protocol, with respect to renal function.      Dosage changed to:  Pepcid 20 mg daily    Additional notes:    Pharmacy to continue to monitor patient daily.   Will make dosage adjustments based upon changing renal function.  Signed EDMUNDO ELDRIDGE RPH.     
\"mucus stuck in my throat\"    Objective:      Patient Vitals for the past 8 hrs:   Temp Pulse Resp BP SpO2   01/31/24 0757 -- -- -- -- 97 %   01/31/24 0430 98.2 °F (36.8 °C) 84 16 106/67 99 %           Patient Vitals for the past 96 hrs:   Weight   01/29/24 1200 90.6 kg (199 lb 11.8 oz)   01/28/24 2205 87.1 kg (192 lb)                      Intake/Output Summary (Last 24 hours) at 1/31/2024 0845  Last data filed at 1/31/2024 0430  Gross per 24 hour   Intake 840 ml   Output 1100 ml   Net -260 ml         Physical Exam:  General:  alert, appears stated age, and cooperative  Neck:  nontender  Lungs:  Bibasilar rales, scattered wheezes throughout   Heart:  regular rate and rhythm, S1, S2 normal, no murmur, click, rub or gallop  Abdomen:  abdomen is soft without significant tenderness  Extremities:  extremities normal, atraumatic, no cyanosis or edema    Data Review:   Last Left ventricular Function (EF):    Lab Results   Component Value Date    LVEF 53 06/29/2022       Labs: Results:       Chemistry Recent Labs     01/28/24  2240 01/29/24  1226 01/30/24  0419 01/31/24  0502    142 140 139   K 5.0 5.6* 5.8* 4.8   * 115* 112* 112*   CO2 26 24 25 24   BUN 18 16 21* 29*   MG 1.6 1.3* 2.1 1.9   PHOS  --  3.8 4.0  --    GLOB 2.4  --   --   --         CBC w/Diff Recent Labs     01/28/24 2240 01/30/24  0419 01/31/24  0502   WBC 9.4 5.3 6.8   RBC 2.80* 2.58* 2.43*   HGB 8.3* 7.6* 7.3*   HCT 28.1* 25.4* 23.7*    387 348        Critical Labs [unfilled]   Coagulation No results for input(s): \"INR\", \"APTT\" in the last 72 hours.    Invalid input(s): \"PTP\"    Lipid Panel Lab Results   Component Value Date/Time    CHOL 152 10/10/2019 12:00 AM    HDL 46 10/10/2019 12:00 AM      BNP Lab Results   Component Value Date/Time    BNP 1,057 10/08/2022 05:15 PM    BNP 2,013 09/09/2022 07:34 AM    BNP 1,871 08/08/2022 01:48 AM    BNP 3,952 07/08/2022 09:53 AM    BNP 1,020 03/18/2021 01:29 AM      Liver Enzymes No results for 
at 2/3/2024 2106  Last data filed at 2/3/2024 1832  Gross per 24 hour   Intake 360 ml   Output 1600 ml   Net -1240 ml         General: Alert, cooperative, no distress, appears stated age.  Found lying in bed no acute distress.  Head:   Normocephalic, without obvious abnormality, atraumatic.  Eyes:   Conjunctivae/corneas clear. PERRL, EOMs intact.  Nose:   Nares normal. No drainage or sinus tenderness.  Neck:   Supple, symmetrical, trachea midline, no adenopathy, thyroid: no enlargement, no carotid bruit and + JVD.  Lungs: Improved air entry.  No wheeze.  No crackles.  Heart:  Regular rate and rhythm, S1, S2 normal.  Abdomen:Soft, non-tender. Bowel sounds normal.  Nondistended.  Extremities:Extremities + trace edema. Dorsal aspect left foot w/ ttp, mild swelling; no erythema, no warmth. Skin intact.   Pulses: 2+ and symmetric all extremities.  Skin: No rashes or lesions to visible skin.  Neurologic:AAOx4, moves all extremities    Labs:    Recent Results (from the past 24 hour(s))   POCT Glucose    Collection Time: 02/02/24  9:51 PM   Result Value Ref Range    POC Glucose 165 (H) 70 - 110 mg/dL   APTT    Collection Time: 02/02/24 10:17 PM   Result Value Ref Range    PTT 42.6 (H) 23.0 - 36.4 SEC   CBC    Collection Time: 02/03/24  6:06 AM   Result Value Ref Range    WBC 8.3 4.6 - 13.2 K/uL    RBC 2.21 (L) 4.20 - 5.30 M/uL    Hemoglobin 6.6 (L) 12.0 - 16.0 g/dL    Hematocrit 22.0 (L) 35.0 - 45.0 %    MCV 99.5 78.0 - 100.0 FL    MCH 29.9 24.0 - 34.0 PG    MCHC 30.0 (L) 31.0 - 37.0 g/dL    RDW 15.5 (H) 11.6 - 14.5 %    Platelets 311 135 - 420 K/uL    MPV 9.8 9.2 - 11.8 FL    Nucleated RBCs 3.5 (H) 0  WBC    nRBC 0.29 (H) 0.00 - 0.01 K/uL   APTT    Collection Time: 02/03/24  6:06 AM   Result Value Ref Range    PTT >180.0 (HH) 23.0 - 36.4 SEC   Renal Function Panel    Collection Time: 02/03/24  6:06 AM   Result Value Ref Range    Sodium 137 136 - 145 mmol/L    Potassium 4.7 3.5 - 5.5 mmol/L    Chloride 112 (H) 100 - 
extremities normal, atraumatic, no cyanosis or edema    Data Review:   Last Left ventricular Function (EF):    Lab Results   Component Value Date    LVEF 53 06/29/2022       Labs: Results:       Chemistry Recent Labs     01/28/24 2240 01/29/24  1226 01/30/24  0419    142 140   K 5.0 5.6* 5.8*   * 115* 112*   CO2 26 24 25   BUN 18 16 21*   MG 1.6 1.3* 2.1   PHOS  --  3.8 4.0   GLOB 2.4  --   --       CBC w/Diff Recent Labs     01/28/24 2240 01/30/24  0419   WBC 9.4 5.3   RBC 2.80* 2.58*   HGB 8.3* 7.6*   HCT 28.1* 25.4*    387      Critical Labs [unfilled]   Coagulation No results for input(s): \"INR\", \"APTT\" in the last 72 hours.    Invalid input(s): \"PTP\"    Lipid Panel Lab Results   Component Value Date/Time    CHOL 152 10/10/2019 12:00 AM    HDL 46 10/10/2019 12:00 AM      BNP Lab Results   Component Value Date/Time    BNP 1,057 10/08/2022 05:15 PM    BNP 2,013 09/09/2022 07:34 AM    BNP 1,871 08/08/2022 01:48 AM    BNP 3,952 07/08/2022 09:53 AM    BNP 1,020 03/18/2021 01:29 AM      Liver Enzymes No results for input(s): \"TP\", \"ALB\" in the last 72 hours.    Invalid input(s): \"TBIL\", \"AP\", \"SGOT\", \"GPT\", \"DBIL\"   Thyroid Studies Lab Results   Component Value Date/Time    TSH 1.29 10/08/2022 05:15 PM        Signed By: Esa Altamirano MD     January 30, 2024        
COLECTOMY, COLOSTOMY TAKEDOWN performed by Adelso Ga MD at [facility] MAIN OR    TUBAL LIGATION         Home Situation:   Social/Functional History  Lives With: Spouse  Type of Home: House  Home Layout: One level  Home Access: Stairs to enter with rails  Entrance Stairs - Number of Steps: 4  Entrance Stairs - Rails: Both  Bathroom Shower/Tub: Tub/Shower unit  Bathroom Toilet: Bedside commode  Bathroom Equipment: Shower chair  Bathroom Accessibility: Accessible  Home Equipment: Walker, rolling, Oxygen, Cane  Has the patient had two or more falls in the past year or any fall with injury in the past year?: Unknown  Receives Help From: Family  ADL Assistance: Independent  Ambulation Assistance: Independent  Transfer Assistance: Independent  Active : No  Occupation: Retired  Type of Occupation:  at [employer]  []  Right hand dominant   []  Left hand dominant    Cognitive/Behavioral Status:  Orientation  Overall Orientation Status: Within Normal Limits  Orientation Level: Oriented X4  Cognition  Overall Cognitive Status: WNL    Skin: appears intact  Edema: none noted      Vision/Perceptual:    Vision  Vision: Impaired  Vision Exceptions: Wears glasses at all times       Coordination: BUE  Coordination: Within functional limits       Balance:     Balance  Sitting: Intact  Standing: With support  Standing - Static: Good  Standing - Dynamic: Good    Strength: BUE  Strength: Within functional limits    Tone & Sensation: BUE  Tone: Normal       Range of Motion: BUE  AROM: Within functional limits       Functional Mobility and Transfers for ADLs:  Bed Mobility:     Bed Mobility Training  Bed Mobility Training: Yes  Rolling: Supervision  Supine to Sit: Modified independent  Sit to Supine: Modified independent  Scooting: Modified independent  Transfers:                 Transfer Training  Transfer Training: Yes  Sit to Stand: Supervision  Stand to Sit: Supervision  Stand Pivot Transfers: Supervision  Toilet Transfer: 
failure with preserved ejection fraction (HCC)    Anemia due to chronic kidney disease, on chronic dialysis (HCC)    S/P exploratory laparotomy    Colostomy in place (HCC)    Perforated diverticulum of large intestine    Abdominal adhesions    Respiratory failure with hypoxia (HCC)    Acute on chronic heart failure with reduced ejection fraction and diastolic dysfunction (HCC)    Acute renal failure superimposed on stage 3b chronic kidney disease (HCC)    Valvular cardiomyopathy (HCC)    Lactic acidosis    Personal history of tobacco use, presenting hazards to health    Moderate COPD (chronic obstructive pulmonary disease) (HCC)    MICHAEL (obstructive sleep apnea)    Acute renal failure with acute tubular necrosis superimposed on stage 5 chronic kidney disease, not on chronic dialysis (HCC)    Mild protein-calorie malnutrition (HCC)    Respiratory distress    Dyspnea and respiratory abnormalities    Exertional dyspnea    Anemia, macrocytic    GI bleed    Melena    Acute on chronic combined systolic and diastolic CHF (congestive heart failure) (HCC)    CHF (congestive heart failure), NYHA class I, acute on chronic, combined (HCC)    Hypoxia    Severe obesity (BMI 35.0-35.9 with comorbidity) (HCC)    COPD, mild (HCC)    Cardiomyopathy (HCC)    Left foot pain      RECOMMENDATIONS:   Advise further diuresis given acute pulmonary edema on chest imaging.  Maintain net negative fluid balance as renal function tolerates.  Diuresis per primary service, Cardiology, and Nephrology -- discussed with Dr. Fowler  No evidence of bronchospasm, discontinue steroids and Abx (from a pulmonary standpoint)  Continue scheduled bronchodilators: Brovana BID and Atrovent TID, and albuterol PRN  Mucinex  Please hold home bronchodilators.  May resume on discharge (Pt documented to be on Anoro)  Supplemental oxygen to maintain SpO2 >89%  Please assess for home oxygen need prior to discharge  Aggressive pulmonary toileting/bronchial 
preserved ejection fraction (HCC)    Anemia due to chronic kidney disease, on chronic dialysis (HCC)    S/P exploratory laparotomy    Colostomy in place (HCC)    Perforated diverticulum of large intestine    Abdominal adhesions    Respiratory failure with hypoxia (HCC)    Acute on chronic heart failure with reduced ejection fraction and diastolic dysfunction (HCC)    Acute renal failure superimposed on stage 3b chronic kidney disease (HCC)    Valvular cardiomyopathy (HCC)    Lactic acidosis    Personal history of tobacco use, presenting hazards to health    Moderate COPD (chronic obstructive pulmonary disease) (HCC)    MICHAEL (obstructive sleep apnea)    Acute renal failure with acute tubular necrosis superimposed on stage 5 chronic kidney disease, not on chronic dialysis (HCC)    Mild protein-calorie malnutrition (HCC)    Respiratory distress    Dyspnea and respiratory abnormalities    Dyspnea on exertion    Anemia    GI bleed    Melena    Acute on chronic combined systolic and diastolic CHF (congestive heart failure) (HCC)    CHF (congestive heart failure), NYHA class I, acute on chronic, combined (HCC)    Hypoxia    Severe obesity (BMI 35.0-35.9 with comorbidity) (HCC)    COPD, mild (HCC)    Dilated cardiomyopathy (HCC)          RECOMMENDATIONS:   Advise further diuresis given acute pulmonary edema on chest imaging.  Maintain net negative fluid balance as renal function tolerates.  Diuresis per primary service, Cardiology, and Nephrology  No evidence of bronchospasm, discontinue steroids and Abx (from a pulmonary standpoint)  Schedule bronchodilators: Brovana BID and Atrovent TID, and albuterol PRN  Mucinex  Please hold home bronchodilators.  May resume on discharge (Pt documented to be on Anoro)  Supplemental oxygen to maintain SpO2 >89%  Please assess for home oxygen need prior to discharge  Aggressive pulmonary toileting/bronchial hygiene  Frequent incentive spirometry  Aspiration precautions including elevating 
°F (37.1 °C) 88 18 (!) 110/54 97 %   02/04/24 1600 98 °F (36.7 °C) 83 18 101/61 --          Weight change:      02/03 1901 - 02/05 0700  In: 1142 [P.O.:360; I.V.:782]  Out: 2751 [Urine:2750]    Intake/Output Summary (Last 24 hours) at 2/5/2024 1352  Last data filed at 2/5/2024 0901  Gross per 24 hour   Intake 1261.98 ml   Output 2501 ml   Net -1239.02 ml       Physical Exam:   General: comfortable, no acute distress   HEENT sclera anicteric, supple neck, no thyromegaly  CVS: S1S2 heard,  no rub  RS: + air entry b/l,   Abd: Soft, Non tender, Not distended, Positive bowel sounds, no organomegaly, no CVA / supra pubic tenderness  Neuro: non focal, awake, alert , CN II-XII are grossly intact  Extrm:plus 1 edema, no cyanosis, clubbing   Skin: no visible  Rash  Musculoskeletal: No gross joints or bone deformities         Data Review:     LABS:   Hematology:   Recent Labs     02/03/24  0606 02/04/24  0620 02/05/24  0426   WBC 8.3 7.2 7.2   HGB 6.6* 7.2* 7.3*   HCT 22.0* 23.5* 23.5*       Chemistry:   Recent Labs     02/03/24  0606 02/04/24  0620 02/05/24  0426   BUN 45* 43* 46*   K 4.7 4.5 4.2    140 138   * 109 105   CO2 25 27 30   PHOS 3.0  --   --               Procedures/imaging: see electronic medical records for all procedures, Xrays and details which were not copied into this note but were reviewed prior to creation of Plan          Assessment & Plan:     See above        JOE MARCELO MD  2/5/2024  1:52 PM            
Recent Labs     02/02/24 2217 02/03/24  0606   APTT 42.6* >180.0*       Lipid Panel Lab Results   Component Value Date/Time    CHOL 152 10/10/2019 12:00 AM    HDL 46 10/10/2019 12:00 AM      BNP Lab Results   Component Value Date/Time    NTPROBNP 9,373 01/30/2024 04:19 AM      Liver Enzymes Lab Results   Component Value Date    ALT 18 01/28/2024    AST 16 01/28/2024    ALKPHOS 132 (H) 01/28/2024    BILITOT 0.5 01/28/2024      Thyroid Studies Lab Results   Component Value Date/Time    TSH 1.29 10/08/2022 05:15 PM          Signed By: Eram Gaytan PA-C     February 3, 2024

## 2024-02-06 NOTE — CARE COORDINATION
02/06/24 1339   IMM Letter   IMM Letter given to Patient/Family/Significant other/Guardian/POA/by: Letter given to Radha Donahue   IMM Letter date given: 02/06/24   IMM Letter time given: 0141

## 2024-02-06 NOTE — HOME CARE
Received home health referral for HC for the following disciplines  (SN / PT / OT).  Discharge orders noted for today.   Spoke with patient in room.   Explained home health care services and routines.  Demographics verified including insurance, phone and address confirmed.  Caregivers available - primary will be spouse  -  contact number  [phone].   Orders noted and arranged and sent to central intake and scheduling.      ---   ANNE French [facility]ia[facility]

## 2024-02-06 NOTE — CARE COORDINATION
Discharge order noted for today. Pt has been accepted to [facility]. Met with patient at the bedside and she is agreeable to the transition plan today. Transport has been arranged through her . Patient's discharge summary and home health  orders have been forwarded to [facility] via [facility]. Updated bedside RN, Linda,  to the transition plan.  Discharge information has been documented on the AVS.       FRANK Elizabeth, RN  Care Management  Phone: [phone]

## 2024-02-06 NOTE — DISCHARGE SUMMARY
Discharge Summary    Patient: Christy Donahue MRN: [id number]  [facility]: [id number]    YOB: 1950  Age: 73 y.o.  Sex: female    DOA: 1/28/2024 LOS:  LOS: 8 days        Disposition: Home with [facility]    Discharge Date: 2/6/2024    Admission Diagnosis: Exertional dyspnea [R06.09]  Acute on chronic combined systolic and diastolic CHF (congestive heart failure) ([facility]) [I50.43]  CHF (congestive heart failure), NYHA class I, acute on chronic, combined (HCC) [I50.43]  Acute on chronic congestive heart failure, unspecified heart failure type ([facility]) [I50.9]    Discharge Diagnosis:    Acute on chronic systolic heart failure exacerbation  COPD, no acute exacerbation  Diabetes mellitus type 2  CKD stage 4  Acute nonocclusive DVT left popliteal and posterior tibial.  Needs a repeat duplex in 2 weeks to assess new DVT.  Will defer to PCP.  History of DVT/PE on anticoagulation  History of GI bleed in the past  Obesity  Hypertension  Dyslipidemia  Noncompliance with medication    Discharge Condition: Stable      PHYSICAL EXAM  Visit Vitals  /69   Pulse 83   Temp 98.8 °F (37.1 °C) (Oral)   Resp 18   Ht 1.626 m (5' 4.02\")   Wt 90.6 kg (199 lb 11.8 oz)   SpO2 98%   BMI 34.27 kg/m²       General: Alert, cooperative, no acute distress    HEENT: PERRLA, EOMI. Anicteric sclerae.  Lungs:  CTA Bilaterally. No Wheezing/Rales.  Heart:             Regular rate and Rhythm.  Abdomen: Soft, Non distended, Non tender. + Bowel sounds.  Extremities: No edema.  Psych:   Good insight. Not anxious or agitated.  Neurologic:  AA, oriented X 3. Moves all ext                                 Hospital Course:   Christy Donahue is a 73 y.o.  female with past medical history of COPD, CKD stage III, hypertension, diabetes mellitus type 2, chronic systolic heart failure and obesity comes to the emergency room complaining of shortness of breath.  Per patient she was recently admitted hospital was discharged last week.  After coming home she

## 2024-02-06 NOTE — DISCHARGE INSTRUCTIONS
Discharge Instructions    Patient: Christy Donahue MRN: [id number]  [facility]: [id number]    YOB: 1950  Age: 73 y.o.  Sex: female    DOA: 1/28/2024       DIET:  cardiac diet and diabetic diet fluid restriction followed about per day    ACTIVITY: activity as tolerated  Home health care for Skilled care for CHF management, DM, Hypertension and medication management       PT/OT consult      ADDITIONAL INFORMATION: If you experience any of the following symptoms but not limited to Fever, chills, nausea, vomiting, diarrhea, change in mentation, falling, bleeding, shortness of breath, chest pain, please call your primary care physician or return to the emergency room if you cannot get hold of your doctor:     FOLLOW UP CARE:      Moraima Scott MD  2/6/2024 11:18 AM

## 2024-02-07 LAB
CARDIOLIPIN IGG SER IA-ACNC: <9 GPL U/ML (ref 0–14)
CARDIOLIPIN IGM SER IA-ACNC: <9 MPL U/ML (ref 0–12)

## 2024-02-08 ENCOUNTER — HOME CARE VISIT (OUTPATIENT)
Age: 74
End: 2024-02-08
Payer: MEDICARE

## 2024-02-08 VITALS
SYSTOLIC BLOOD PRESSURE: 140 MMHG | RESPIRATION RATE: 18 BRPM | TEMPERATURE: 97.4 F | HEART RATE: 88 BPM | OXYGEN SATURATION: 97 % | DIASTOLIC BLOOD PRESSURE: 80 MMHG

## 2024-02-08 PROCEDURE — G0299 HHS/HOSPICE OF RN EA 15 MIN: HCPCS

## 2024-02-08 ASSESSMENT — ENCOUNTER SYMPTOMS: DYSPNEA ACTIVITY LEVEL: AFTER AMBULATING 10 - 20 FT

## 2024-02-09 ENCOUNTER — OFFICE VISIT (OUTPATIENT)
Age: 74
End: 2024-02-09
Payer: MEDICARE

## 2024-02-09 VITALS — WEIGHT: 183 LBS | BODY MASS INDEX: 31.24 KG/M2 | HEIGHT: 64 IN

## 2024-02-09 DIAGNOSIS — M17.12 ARTHRITIS OF LEFT KNEE: Primary | ICD-10-CM

## 2024-02-09 DIAGNOSIS — M17.12 UNILATERAL PRIMARY OSTEOARTHRITIS, LEFT KNEE: ICD-10-CM

## 2024-02-09 DIAGNOSIS — G89.29 CHRONIC PAIN OF LEFT KNEE: ICD-10-CM

## 2024-02-09 DIAGNOSIS — M25.462 EFFUSION, LEFT KNEE: ICD-10-CM

## 2024-02-09 DIAGNOSIS — M25.562 LEFT KNEE PAIN, UNSPECIFIED CHRONICITY: ICD-10-CM

## 2024-02-09 DIAGNOSIS — M25.462 EFFUSION OF LEFT KNEE: ICD-10-CM

## 2024-02-09 DIAGNOSIS — M79.605 LEFT LEG PAIN: ICD-10-CM

## 2024-02-09 DIAGNOSIS — M25.562 CHRONIC PAIN OF LEFT KNEE: ICD-10-CM

## 2024-02-09 PROCEDURE — G8417 CALC BMI ABV UP PARAM F/U: HCPCS | Performed by: PHYSICIAN ASSISTANT

## 2024-02-09 PROCEDURE — 99213 OFFICE O/P EST LOW 20 MIN: CPT | Performed by: PHYSICIAN ASSISTANT

## 2024-02-09 PROCEDURE — G8484 FLU IMMUNIZE NO ADMIN: HCPCS | Performed by: PHYSICIAN ASSISTANT

## 2024-02-09 PROCEDURE — G8428 CUR MEDS NOT DOCUMENT: HCPCS | Performed by: PHYSICIAN ASSISTANT

## 2024-02-09 PROCEDURE — 1036F TOBACCO NON-USER: CPT | Performed by: PHYSICIAN ASSISTANT

## 2024-02-09 PROCEDURE — 1090F PRES/ABSN URINE INCON ASSESS: CPT | Performed by: PHYSICIAN ASSISTANT

## 2024-02-09 PROCEDURE — 3017F COLORECTAL CA SCREEN DOC REV: CPT | Performed by: PHYSICIAN ASSISTANT

## 2024-02-09 PROCEDURE — 1111F DSCHRG MED/CURRENT MED MERGE: CPT | Performed by: PHYSICIAN ASSISTANT

## 2024-02-09 PROCEDURE — G8399 PT W/DXA RESULTS DOCUMENT: HCPCS | Performed by: PHYSICIAN ASSISTANT

## 2024-02-09 PROCEDURE — 1123F ACP DISCUSS/DSCN MKR DOCD: CPT | Performed by: PHYSICIAN ASSISTANT

## 2024-02-09 RX ORDER — BUPIVACAINE HYDROCHLORIDE 5 MG/ML
7 INJECTION, SOLUTION PERINEURAL ONCE
Status: COMPLETED | OUTPATIENT
Start: 2024-02-09 | End: 2024-02-09

## 2024-02-09 RX ORDER — TRIAMCINOLONE ACETONIDE 40 MG/ML
40 INJECTION, SUSPENSION INTRA-ARTICULAR; INTRAMUSCULAR ONCE
Status: COMPLETED | OUTPATIENT
Start: 2024-02-09 | End: 2024-02-09

## 2024-02-09 RX ADMIN — BUPIVACAINE HYDROCHLORIDE 35 MG: 5 INJECTION, SOLUTION PERINEURAL at 15:52

## 2024-02-09 RX ADMIN — TRIAMCINOLONE ACETONIDE 40 MG: 40 INJECTION, SUSPENSION INTRA-ARTICULAR; INTRAMUSCULAR at 15:53

## 2024-02-09 NOTE — PROGRESS NOTES
muscle spasm / pain    (yES) Physical therapy ordered for range of motion all modalities, stretching, range of motion of specific functional group associated     with primary treating condition,  gentle strengthening of musculature with attention to increasing endurance, gait training,balance and fine motor coordination.       Special note: I reviewed and agree with the PFSH and the ROS as well as the intake form in the chart in the record.  I have reviewed prior medical record(s) in detail regarding this patient in this care appointment.       In light of the patient's osteoarthritic findings I am making a recommendation for aerobic exercise to include but not limited to stationary bicycle, elliptical, therapeutic walking with good shoes and or swimming.  Patient should avoid any running or jumping.  If using the treadmill then recommendation for no elevation and no running or jogging.       Appropriate for outpatient management. Will discuss supportive treatment, NSAIDS, RICE and orthopedic follow-up. Discussed treatment plan, return precautions, symptomatic relief, and expected time to improvement. All questions answered. Patient is stable for discharge and outpatient management. Medication use, risk/benefit, side effects, and precautions discussed.        Care plan outlined and precautions discussed.  Results were reviewed with the patient. All medications were reviewed with the patient. All of pt's questions and concerns were addressed.  Alarm symptoms and return precautions associated with chief complaint and evaluation were reviewed with the patient in detail.  The patient demonstrated adequate understanding.The patient expresses willing compliance with the treatment plan.     Special note: Medication management discussed in detail all patient's questions answered to their satisfaction.        Patient provided a reminder for a \"due or due soon\" health maintenance. I have asked the patient to schedule an

## 2024-02-09 NOTE — HOME HEALTH
Skilled services/Home bound verification:     Skilled Reason for admission/summary of clinical condition:  acute CHF.  recommended patient to allow PT eval due to impaired mobilty but patient declined PT/OT as she wants to see her ortho first due to increased knee pain and recent DVT to left knee.  encouraged patient to use walker as she was observed ambulating without it. Patient also needs reinforcement with daily blood sugar monitoring as patient states she does not check everyday but does take her Levemir every night. Patient has glucometer in the home.     This patient is homebound for the following reasons Requires considerable and taxing effort to leave the home  and Requires the assistance of 1 or more persons to leave the home .    Caregiver: spouse.  Caregiver assists with ADLs, meal prep, meds and transportation.    Medications reconciled and all medications are available in the home this visit.      The following education was provided regarding medications: Instructed patient about the Eliquis ( apixaban ) this is helps to prevent that platelets in your blood from sticking together and forming a blood clot. Eliquis is used to lower the risk of stroke caused by a blood clot in people with a heart rhythm disorder called atrial fibrillation. Because Eliquis keeps your blood from coagulating ( clotting ) to prevent unwanted blood clots, this medicine can also make it easier for you to bleed, even from a minor injury such as a fall or a bump on the head. Do not stop taking Eliquis unless your doctor tells you to. Stopping suddenly can increase your risk of blood clot or stroke.    Dr. Mahan aware of major drug interaction: eliquis and ASA increased risk of bleeding.      Medications  are somewhat effective at this time.      High risk medication teaching regarding anticoagulants, antiplatelets, antibiotics, antipsychotics, hypoglycemic agents, or opioid/narcotics performed (specify): Instructed

## 2024-02-10 LAB
APTT SCREEN TO CONFIRM RATIO: 0.94 RATIO (ref 0–1.34)
CONFIRM APTT/NORMAL: 36.3 SEC (ref 0–47.6)
LA 2 SCREEN W REFLEX-IMP: ABNORMAL
SCREEN APTT: 36.7 SEC (ref 0–43.5)
SCREEN DRVVT: 39.9 SEC (ref 0–47)
THROMBIN TIME: 128.2 SEC (ref 0–23)

## 2024-02-13 ENCOUNTER — HOME CARE VISIT (OUTPATIENT)
Age: 74
End: 2024-02-13
Payer: MEDICARE

## 2024-02-15 ENCOUNTER — HOME CARE VISIT (OUTPATIENT)
Age: 74
End: 2024-02-15
Payer: MEDICARE

## 2024-02-15 PROCEDURE — G0300 HHS/HOSPICE OF LPN EA 15 MIN: HCPCS

## 2024-02-16 ENCOUNTER — OFFICE VISIT (OUTPATIENT)
Age: 74
End: 2024-02-16

## 2024-02-16 VITALS
HEART RATE: 87 BPM | DIASTOLIC BLOOD PRESSURE: 76 MMHG | SYSTOLIC BLOOD PRESSURE: 122 MMHG | OXYGEN SATURATION: 100 % | RESPIRATION RATE: 18 BRPM | TEMPERATURE: 98.1 F

## 2024-02-16 DIAGNOSIS — M25.562 LEFT KNEE PAIN, UNSPECIFIED CHRONICITY: ICD-10-CM

## 2024-02-16 DIAGNOSIS — M25.661 STIFFNESS OF RIGHT KNEE, NOT ELSEWHERE CLASSIFIED: ICD-10-CM

## 2024-02-16 DIAGNOSIS — M25.562 CHRONIC PAIN OF LEFT KNEE: ICD-10-CM

## 2024-02-16 DIAGNOSIS — M25.462 EFFUSION, LEFT KNEE: ICD-10-CM

## 2024-02-16 DIAGNOSIS — M25.561 RIGHT KNEE PAIN, UNSPECIFIED CHRONICITY: ICD-10-CM

## 2024-02-16 DIAGNOSIS — M25.661 DECREASED RANGE OF MOTION (ROM) OF RIGHT KNEE: ICD-10-CM

## 2024-02-16 DIAGNOSIS — G89.29 CHRONIC PAIN OF LEFT KNEE: ICD-10-CM

## 2024-02-16 DIAGNOSIS — M17.12 PRIMARY OSTEOARTHRITIS OF LEFT KNEE: Primary | ICD-10-CM

## 2024-02-16 DIAGNOSIS — M25.461 EFFUSION, RIGHT KNEE: ICD-10-CM

## 2024-02-16 NOTE — HOME HEALTH
Skilled reason for visit: Disease and Medication Management     Caregiver involvement: Spouse assist with transportation .    Medications reviewed and all medications are available in the home this visit.    The following education was provided regarding medications:  SN Instructed patient about the Eliquis ( apixaban ) this is helps to prevent that platelets in your blood from sticking together and forming a blood clot. Eliquis is used to lower the risk of stroke caused by a blood clot in people with a heart rhythm disorder called atrial fibrillation. Because Eliquis keeps your blood from coagulating ( clotting ) to prevent unwanted blood clots, this medicine can also make it easier for you to bleed, even from a minor injury such as a fall or a bump on the head. Do not stop taking Eliquis unless your doctor tells you to. Stopping suddenly can increase your risk of blood clot or stroke  MD notified of any discrepancies/look a-like medications/medication interactions: n/a  Medications are effective at this time.      Home health supplies by type and quantity ordered/delivered this visit include: n/a    Patient education provided this visit: medication teaching  safety and fall prevention education  nutrition education  skin care and assessment  pain management      Sharps education provided: Clinician instructed patient/CG on proper disposal of sharps: Containers should be made of hard plastic, be puncture-resistant and leakproof, such as a laundry detergent or bleach bottle.  When the container is ¾ full, it should be sealed with tape and labeled DO NOT RECYCLE prior to discarding in the regular trash.      Patient level of understanding of education provided: Patient verbalized complete understanding of education provided     Patient response to procedure performed:  n/a    Agency Progress toward goals: continue to educate    Patient's Progress towards personal goals: meeting personal goals     Home exercise

## 2024-02-16 NOTE — PROGRESS NOTES
Patient: Christy Donahue                MRN: [id number]       SSN: [id number]  YOB: 1950        AGE: 73 y.o.        SEX: female      OFFICE NOTE DICTATED    PCP: Kalli Louise APRN - NP  02/16/24 2/16/24: Returns to begin Gelsyn 3 Bilateral knees.  She believes she has recurred with an effusion on her left knee.  She also has been treated for effusion with cortisone following 2 weeks ago.  She notes swelling and pain in the right knee as well.      HISTORY:    Christy Donahue is a 73 y.o. female returns to the office with a complaint of acute on chronic left knee pain with swelling.  Patient has a history of tricompartmental end-stage osteoarthritis of the left knee and has undergone formal aspiration with following cortisone injections in the past.    She was recently discharged from 2 hospital stays both acute in nature for cardiopulmonary concerns as well as end-stage kidney disease.    She is currently a dialysis patient with IV access for dialysis procedures recently completed.    Patient is a limited home ambulator.  She does use a 4 post rolling walker for ambulation assistance.    During the course of her hospital stays she was found to have a DVT of her left lower extremity which is as below:    Acute non-occlusive deep vein thrombosis in the left popliteal vein and posterior tibial vein    Chronic non-occlusive deep vein thrombosis in the left peroneal vein.    For comparison purposes, the right common femoral vein was briefly interrogated. The vein demonstrates normal color filling and compressibility. Doppler flow was phasic and spontaneous.    Patient is currently on Eliquis.    No flowsheet data found.    No results found for: \"HBA1C\", \"LHT2SPZM\"      2/9/2024     3:32 PM 1/29/2024    12:00 PM 1/28/2024    10:05 PM 1/13/2024     8:51 AM 1/11/2024     6:02 AM 1/4/2024     8:39 PM 12/30/2023     2:44 PM   Weight Metrics   Weight 183 lb 199 lb 11.8 oz 192 lb 192 lb 192 lb 192 lb

## 2024-02-20 ENCOUNTER — HOME CARE VISIT (OUTPATIENT)
Age: 74
End: 2024-02-20
Payer: MEDICARE

## 2024-02-20 PROCEDURE — G0300 HHS/HOSPICE OF LPN EA 15 MIN: HCPCS

## 2024-02-21 ENCOUNTER — OFFICE VISIT (OUTPATIENT)
Age: 74
End: 2024-02-21

## 2024-02-21 VITALS
OXYGEN SATURATION: 98 % | SYSTOLIC BLOOD PRESSURE: 132 MMHG | HEART RATE: 66 BPM | DIASTOLIC BLOOD PRESSURE: 90 MMHG | TEMPERATURE: 97.8 F | RESPIRATION RATE: 18 BRPM

## 2024-02-21 VITALS — TEMPERATURE: 97.5 F | BODY MASS INDEX: 32.44 KG/M2 | HEIGHT: 64 IN | WEIGHT: 190 LBS

## 2024-02-21 DIAGNOSIS — M47.817 DJD (DEGENERATIVE JOINT DISEASE), LUMBOSACRAL: ICD-10-CM

## 2024-02-21 DIAGNOSIS — M54.40 ACUTE MIDLINE LOW BACK PAIN WITH SCIATICA, SCIATICA LATERALITY UNSPECIFIED: ICD-10-CM

## 2024-02-21 DIAGNOSIS — M25.69 DECREASED RANGE OF MOTION OF TRUNK AND BACK: ICD-10-CM

## 2024-02-21 DIAGNOSIS — M54.41 ACUTE RIGHT-SIDED LOW BACK PAIN WITH RIGHT-SIDED SCIATICA: ICD-10-CM

## 2024-02-21 DIAGNOSIS — M17.11 PRIMARY OSTEOARTHRITIS OF RIGHT KNEE: ICD-10-CM

## 2024-02-21 DIAGNOSIS — M54.9 BACK PAIN, UNSPECIFIED BACK LOCATION, UNSPECIFIED BACK PAIN LATERALITY, UNSPECIFIED CHRONICITY: ICD-10-CM

## 2024-02-21 DIAGNOSIS — M47.817 FACET ARTHROPATHY, LUMBOSACRAL: ICD-10-CM

## 2024-02-21 DIAGNOSIS — M17.12 PRIMARY OSTEOARTHRITIS OF LEFT KNEE: Primary | ICD-10-CM

## 2024-02-21 DIAGNOSIS — M47.816 SPONDYLOSIS OF LUMBAR SPINE: ICD-10-CM

## 2024-02-21 RX ORDER — CYCLOBENZAPRINE HCL 10 MG
10 TABLET ORAL 3 TIMES DAILY PRN
Qty: 30 TABLET | Refills: 0 | Status: SHIPPED | OUTPATIENT
Start: 2024-02-21 | End: 2024-03-02

## 2024-02-21 RX ORDER — PREDNISONE 50 MG/1
50 TABLET ORAL DAILY
Qty: 5 TABLET | Refills: 0 | Status: SHIPPED | OUTPATIENT
Start: 2024-02-21 | End: 2024-02-26

## 2024-02-21 RX ORDER — HYDROCODONE BITARTRATE AND ACETAMINOPHEN 5; 325 MG/1; MG/1
1 TABLET ORAL EVERY 8 HOURS PRN
Qty: 21 TABLET | Refills: 0 | Status: SHIPPED | OUTPATIENT
Start: 2024-02-21 | End: 2024-02-28

## 2024-02-21 NOTE — HOME HEALTH
Skilled reason for visit: Disease and Medication Manangement    Caregiver involvement: Patient independent in the home spouse assist with transportation.    Medications reviewed and all medications are available in the home this visit.    The following education was provided regarding medicationsMetolazone is used to treat fluid retention (edema) in people with congestive heart failure, or a kidney disorder such as nephrotic syndrome. Metolazone is also used to treat high blood pressure (hypertension). Possible side effects to be aware of include low potassium, headache, constipation, diarrhea, dizziness, drowsiness and blurred vision.  MD notified of any discrepancies/look a-like medications/medication interactions: n/a  Medications are effective at this time.      Home health supplies by type and quantity ordered/delivered this visit include: n/a    Patient education provided this visit: SN instructed patient to take pain medication before pain becomes severe to achieve better pain control. Always refill your meds before you run out of them. SN instructed patient on nonpharmacologic pain relief measures, including relaxation techniques, positioning ,etc. SN instructed to report to physician if experiencing pain level not acceptable , pain level greater than 7/10, pain medications not effective, unable to tolerate pain medications, and pain affecting ability to perform normal activities    Sharps education provided: Clinician instructed patient/CG on proper disposal of sharps: Containers should be made of hard plastic, be puncture-resistant and leakproof, such as a laundry detergent or bleach bottle.  When the container is ¾ full, it should be sealed with tape and labeled DO NOT RECYCLE prior to discarding in the regular trash.      Patient level of understanding of education provided: Patient verbalized complete understanding of education provided    Patient response to procedure performed:  n/a    Agency Progress

## 2024-02-21 NOTE — PROGRESS NOTES
x-rays reviewed if obtained this visit, and provided to patient.        Dictation disclaimer:  Please note that this dictation was completed with \"Dragon\", the computer voice recognition software. Today's exam was dictated using fluency dictation software (voice recognition software). Occasionally, sound-a-like computer induced   dictation errors will populate the report.  Quite often unanticipated grammatical, syntax, homophones, and other interpretive errors are inadvertently transcribed by the computer software.  Please disregard these errors.  Please excuse any errors that have escaped final proofreading.        Carlito CINTRON, APC, MPAS, PA-C  [facility]  [facility]

## 2024-02-24 ENCOUNTER — HOME CARE VISIT (OUTPATIENT)
Age: 74
End: 2024-02-24
Payer: MEDICARE

## 2024-02-27 ENCOUNTER — HOME CARE VISIT (OUTPATIENT)
Age: 74
End: 2024-02-27
Payer: MEDICARE

## 2024-02-27 VITALS
OXYGEN SATURATION: 100 % | HEART RATE: 87 BPM | TEMPERATURE: 98.1 F | SYSTOLIC BLOOD PRESSURE: 152 MMHG | DIASTOLIC BLOOD PRESSURE: 87 MMHG | RESPIRATION RATE: 18 BRPM

## 2024-02-27 PROCEDURE — G0300 HHS/HOSPICE OF LPN EA 15 MIN: HCPCS

## 2024-02-27 NOTE — HOME HEALTH
Skilled reason for visit: Disease and Medication Management    Caregiver involvement: Patient independent in the home family assist with transportation.    Medications reviewed and all medications are available in the home this visit.    The following education was provided regarding medications:  Medication reviewed no changes question or concern .    MD notified of any discrepancies/look a-like medications/medication interactions: n/a  Medications are effective at this time.      Home health supplies by type and quantity ordered/delivered this visit include: n/a    Patient education provided this visit: medication teaching  safety and fall prevention education  nutrition education  skin care and assessment  pain management  importance of checking BS regular while on insulin      Sharps education provided: Clinician instructed patient/CG on proper disposal of sharps: Containers should be made of hard plastic, be puncture-resistant and leakproof, such as a laundry detergent or bleach bottle.  When the container is ¾ full, it should be sealed with tape and labeled DO NOT RECYCLE prior to discarding in the regular trash.      Patient level of understanding of education provided: Patient verbalized understanding of edicatio proided but doesnot feel the need to check BS at this time    Patient response to procedure performed:  n/a    Agency Progress toward goals: Met all SN Goals     Patient's Progress towards personal goals: met all personal goals     Home exercise program: n/a    Continued need for the following skills: Nursing.    Plan for next visit: Discharge     Patient and/or caregiver notified and agrees to changes in the Plan of Care: Yes.     The following discharge planning was discussed with the pt/caregiver: No longer need skilled services and schedule for discharge.

## 2024-02-28 ENCOUNTER — OFFICE VISIT (OUTPATIENT)
Age: 74
End: 2024-02-28
Payer: MEDICARE

## 2024-02-28 VITALS — HEIGHT: 64 IN | TEMPERATURE: 97.8 F | BODY MASS INDEX: 32.1 KG/M2 | WEIGHT: 188 LBS

## 2024-02-28 DIAGNOSIS — M17.12 PRIMARY OSTEOARTHRITIS OF LEFT KNEE: ICD-10-CM

## 2024-02-28 DIAGNOSIS — M17.11 PRIMARY OSTEOARTHRITIS OF RIGHT KNEE: Primary | ICD-10-CM

## 2024-02-28 PROCEDURE — 99024 POSTOP FOLLOW-UP VISIT: CPT | Performed by: PHYSICIAN ASSISTANT

## 2024-02-28 PROCEDURE — 20610 DRAIN/INJ JOINT/BURSA W/O US: CPT | Performed by: PHYSICIAN ASSISTANT

## 2024-02-28 RX ORDER — BUPIVACAINE HYDROCHLORIDE 5 MG/ML
7 INJECTION, SOLUTION PERINEURAL ONCE
Status: COMPLETED | OUTPATIENT
Start: 2024-02-28 | End: 2024-02-28

## 2024-02-28 RX ORDER — TRIAMCINOLONE ACETONIDE 40 MG/ML
40 INJECTION, SUSPENSION INTRA-ARTICULAR; INTRAMUSCULAR ONCE
Status: COMPLETED | OUTPATIENT
Start: 2024-02-28 | End: 2024-02-28

## 2024-02-28 RX ADMIN — TRIAMCINOLONE ACETONIDE 40 MG: 40 INJECTION, SUSPENSION INTRA-ARTICULAR; INTRAMUSCULAR at 13:11

## 2024-02-28 RX ADMIN — BUPIVACAINE HYDROCHLORIDE 35 MG: 5 INJECTION, SOLUTION PERINEURAL at 13:09

## 2024-02-28 NOTE — PROGRESS NOTES
cortisone injection and delaying the third injection of Gelsyn and #3 Gelsyn-3 for of the right knee acute on chronic symptoms as they relate to her tricompartmental osteoarthritis.  Patient agreed with plan of care.    Procedural: Using sterile technique and verbal and written consent were obtained and appropriate timeout formed patient laying supine left knee flexed at 20 degrees on a bolster 5 cc of 0.5% Marcaine used anesthetize a superior lateral interarticular approach.  To follow 15 cc of straw-colored blood tinted aspirate removed from the same portal.  Fluid was discarded.  Patient was then injected 1 mL of Kenalog and 40 mg/mL mixed with 7 mL of Marcaine 0.5%.  She was then repositioned  right knee using the superior lateral interarticular approach  injected with 2 cc of Gelsyn-3.  Patient tolerated the procedures well.              Special note: Medication management discussed and patient will begin the following per recommended dosing.    (  ) Flector patch 1.3% topically twice daily to area of concern    (  ) Flexeril 10mg po 3 x daily as needed muscle spasm / pain    (yES) Physical therapy ordered for range of motion all modalities, stretching, range of motion of specific functional group associated     with primary treating condition,  gentle strengthening of musculature with attention to increasing endurance, gait training,balance and fine motor coordination.       Special note: I reviewed and agree with the PFSH and the ROS as well as the intake form in the chart in the record.  I have reviewed prior medical record(s) in detail regarding this patient in this care appointment.       In light of the patient's osteoarthritic findings I am making a recommendation for aerobic exercise to include but not limited to stationary bicycle, elliptical, therapeutic walking with good shoes and or swimming.  Patient should avoid any running or jumping.  If using the treadmill then recommendation for no elevation

## 2024-02-29 ENCOUNTER — HOSPITAL ENCOUNTER (OUTPATIENT)
Facility: HOSPITAL | Age: 74
Discharge: HOME | End: 2024-03-03

## 2024-02-29 LAB — LABCORP SPECIMEN COLLECTION: NORMAL

## 2024-03-01 ENCOUNTER — APPOINTMENT (OUTPATIENT)
Facility: HOSPITAL | Age: 74
DRG: 280 | End: 2024-03-01
Payer: MEDICARE

## 2024-03-01 ENCOUNTER — HOSPITAL ENCOUNTER (INPATIENT)
Facility: HOSPITAL | Age: 74
LOS: 8 days | Discharge: HOME | DRG: 280 | End: 2024-03-09
Attending: STUDENT IN AN ORGANIZED HEALTH CARE EDUCATION/TRAINING PROGRAM | Admitting: STUDENT IN AN ORGANIZED HEALTH CARE EDUCATION/TRAINING PROGRAM
Payer: MEDICARE

## 2024-03-01 DIAGNOSIS — M79.10 MYALGIA: ICD-10-CM

## 2024-03-01 DIAGNOSIS — I50.9 ACUTE ON CHRONIC CONGESTIVE HEART FAILURE, UNSPECIFIED HEART FAILURE TYPE (HCC): ICD-10-CM

## 2024-03-01 DIAGNOSIS — J96.90 RESPIRATORY FAILURE REQUIRING INTUBATION (HCC): ICD-10-CM

## 2024-03-01 DIAGNOSIS — I21.4 NSTEMI (NON-ST ELEVATED MYOCARDIAL INFARCTION) (HCC): Primary | ICD-10-CM

## 2024-03-01 DIAGNOSIS — I10 ESSENTIAL HYPERTENSION: ICD-10-CM

## 2024-03-01 DIAGNOSIS — R79.89 ELEVATED BRAIN NATRIURETIC PEPTIDE (BNP) LEVEL: ICD-10-CM

## 2024-03-01 DIAGNOSIS — N17.9 ACUTE KIDNEY INJURY SUPERIMPOSED ON CKD (HCC): ICD-10-CM

## 2024-03-01 DIAGNOSIS — I82.4Y9 DEEP VEIN THROMBOSIS (DVT) OF PROXIMAL LOWER EXTREMITY, UNSPECIFIED CHRONICITY, UNSPECIFIED LATERALITY (HCC): ICD-10-CM

## 2024-03-01 DIAGNOSIS — Z79.01 ON ANTICOAGULANT THERAPY: ICD-10-CM

## 2024-03-01 DIAGNOSIS — N18.9 ACUTE KIDNEY INJURY SUPERIMPOSED ON CKD (HCC): ICD-10-CM

## 2024-03-01 DIAGNOSIS — D64.9 NORMOCYTIC ANEMIA: ICD-10-CM

## 2024-03-01 LAB
ALBUMIN SERPL-MCNC: 3.4 G/DL (ref 3.4–5)
ALBUMIN/GLOB SERPL: 1.4 (ref 0.8–1.7)
ALP SERPL-CCNC: 114 U/L (ref 45–117)
ALT SERPL-CCNC: 20 U/L (ref 13–56)
ANION GAP SERPL CALC-SCNC: 10 MMOL/L (ref 3–18)
AST SERPL-CCNC: 10 U/L (ref 10–38)
BASOPHILS # BLD: 0 K/UL (ref 0–0.1)
BASOPHILS NFR BLD: 0 % (ref 0–2)
BILIRUB SERPL-MCNC: 0.5 MG/DL (ref 0.2–1)
BUN SERPL-MCNC: 69 MG/DL (ref 7–18)
BUN/CREAT SERPL: 26 (ref 12–20)
CALCIUM SERPL-MCNC: 9.2 MG/DL (ref 8.5–10.1)
CHLORIDE SERPL-SCNC: 111 MMOL/L (ref 100–111)
CO2 SERPL-SCNC: 15 MMOL/L (ref 21–32)
CREAT SERPL-MCNC: 2.66 MG/DL (ref 0.6–1.3)
DIFFERENTIAL METHOD BLD: ABNORMAL
EOSINOPHIL # BLD: 0 K/UL (ref 0–0.4)
EOSINOPHIL NFR BLD: 0 % (ref 0–5)
ERYTHROCYTE [DISTWIDTH] IN BLOOD BY AUTOMATED COUNT: 16.4 % (ref 11.6–14.5)
FLUAV RNA SPEC QL NAA+PROBE: NOT DETECTED
FLUBV RNA SPEC QL NAA+PROBE: NOT DETECTED
GLOBULIN SER CALC-MCNC: 2.5 G/DL (ref 2–4)
GLUCOSE SERPL-MCNC: 283 MG/DL (ref 74–99)
HCT VFR BLD AUTO: 24.1 % (ref 35–45)
HGB BLD-MCNC: 7.2 G/DL (ref 12–16)
IMM GRANULOCYTES # BLD AUTO: 0.3 K/UL (ref 0–0.04)
IMM GRANULOCYTES NFR BLD AUTO: 2 % (ref 0–0.5)
INR PPP: 1.4 (ref 0.9–1.1)
LIPASE SERPL-CCNC: 38 U/L (ref 13–75)
LYMPHOCYTES # BLD: 0.3 K/UL (ref 0.9–3.6)
LYMPHOCYTES NFR BLD: 2 % (ref 21–52)
MAGNESIUM SERPL-MCNC: 1.7 MG/DL (ref 1.6–2.6)
MCH RBC QN AUTO: 29.6 PG (ref 24–34)
MCHC RBC AUTO-ENTMCNC: 29.9 G/DL (ref 31–37)
MCV RBC AUTO: 99.2 FL (ref 78–100)
MONOCYTES # BLD: 0.8 K/UL (ref 0.05–1.2)
MONOCYTES NFR BLD: 6 % (ref 3–10)
NEUTS SEG # BLD: 11.4 K/UL (ref 1.8–8)
NEUTS SEG NFR BLD: 90 % (ref 40–73)
NRBC # BLD: 0.17 K/UL (ref 0–0.01)
NRBC BLD-RTO: 1.3 PER 100 WBC
NT PRO BNP: 5212 PG/ML (ref 0–900)
PLATELET # BLD AUTO: 331 K/UL (ref 135–420)
PMV BLD AUTO: 10.1 FL (ref 9.2–11.8)
POTASSIUM SERPL-SCNC: 5.2 MMOL/L (ref 3.5–5.5)
PROCALCITONIN SERPL-MCNC: 0.08 NG/ML
PROT SERPL-MCNC: 5.9 G/DL (ref 6.4–8.2)
PROTHROMBIN TIME: 17.1 SEC (ref 11.9–14.7)
RBC # BLD AUTO: 2.43 M/UL (ref 4.2–5.3)
SARS-COV-2 RNA RESP QL NAA+PROBE: NOT DETECTED
SODIUM SERPL-SCNC: 136 MMOL/L (ref 136–145)
TROPONIN I SERPL HS-MCNC: 139 NG/L (ref 0–54)
TROPONIN I SERPL HS-MCNC: 167 NG/L (ref 0–54)
WBC # BLD AUTO: 12.8 K/UL (ref 4.6–13.2)

## 2024-03-01 PROCEDURE — 6370000000 HC RX 637 (ALT 250 FOR IP): Performed by: STUDENT IN AN ORGANIZED HEALTH CARE EDUCATION/TRAINING PROGRAM

## 2024-03-01 PROCEDURE — 84484 ASSAY OF TROPONIN QUANT: CPT

## 2024-03-01 PROCEDURE — 71045 X-RAY EXAM CHEST 1 VIEW: CPT

## 2024-03-01 PROCEDURE — 87636 SARSCOV2 & INF A&B AMP PRB: CPT

## 2024-03-01 PROCEDURE — 99291 CRITICAL CARE FIRST HOUR: CPT

## 2024-03-01 PROCEDURE — 85025 COMPLETE CBC W/AUTO DIFF WBC: CPT

## 2024-03-01 PROCEDURE — 36556 INSERT NON-TUNNEL CV CATH: CPT

## 2024-03-01 PROCEDURE — 85610 PROTHROMBIN TIME: CPT

## 2024-03-01 PROCEDURE — 1100000000 HC RM PRIVATE

## 2024-03-01 PROCEDURE — 83735 ASSAY OF MAGNESIUM: CPT

## 2024-03-01 PROCEDURE — 83880 ASSAY OF NATRIURETIC PEPTIDE: CPT

## 2024-03-01 PROCEDURE — 83690 ASSAY OF LIPASE: CPT

## 2024-03-01 PROCEDURE — 93005 ELECTROCARDIOGRAM TRACING: CPT | Performed by: STUDENT IN AN ORGANIZED HEALTH CARE EDUCATION/TRAINING PROGRAM

## 2024-03-01 PROCEDURE — 84145 PROCALCITONIN (PCT): CPT

## 2024-03-01 PROCEDURE — 99222 1ST HOSP IP/OBS MODERATE 55: CPT | Performed by: STUDENT IN AN ORGANIZED HEALTH CARE EDUCATION/TRAINING PROGRAM

## 2024-03-01 PROCEDURE — 80053 COMPREHEN METABOLIC PANEL: CPT

## 2024-03-01 RX ORDER — HEPARIN SODIUM 1000 [USP'U]/ML
2000 INJECTION, SOLUTION INTRAVENOUS; SUBCUTANEOUS PRN
Status: DISCONTINUED | OUTPATIENT
Start: 2024-03-01 | End: 2024-03-06

## 2024-03-01 RX ORDER — SODIUM CHLORIDE 0.9 % (FLUSH) 0.9 %
5-40 SYRINGE (ML) INJECTION PRN
Status: DISCONTINUED | OUTPATIENT
Start: 2024-03-01 | End: 2024-03-09 | Stop reason: HOSPADM

## 2024-03-01 RX ORDER — SODIUM CHLORIDE 0.9 % (FLUSH) 0.9 %
5-40 SYRINGE (ML) INJECTION EVERY 12 HOURS SCHEDULED
Status: DISCONTINUED | OUTPATIENT
Start: 2024-03-02 | End: 2024-03-09 | Stop reason: HOSPADM

## 2024-03-01 RX ORDER — FAMOTIDINE 20 MG/1
20 TABLET, FILM COATED ORAL DAILY
Status: DISCONTINUED | OUTPATIENT
Start: 2024-03-02 | End: 2024-03-09 | Stop reason: HOSPADM

## 2024-03-01 RX ORDER — HEPARIN SODIUM 10000 [USP'U]/100ML
5-30 INJECTION, SOLUTION INTRAVENOUS CONTINUOUS
Status: DISCONTINUED | OUTPATIENT
Start: 2024-03-01 | End: 2024-03-05

## 2024-03-01 RX ORDER — ACETAMINOPHEN 325 MG/1
650 TABLET ORAL EVERY 6 HOURS PRN
Status: DISCONTINUED | OUTPATIENT
Start: 2024-03-01 | End: 2024-03-09 | Stop reason: HOSPADM

## 2024-03-01 RX ORDER — ASPIRIN 81 MG/1
324 TABLET, CHEWABLE ORAL
Status: DISCONTINUED | OUTPATIENT
Start: 2024-03-01 | End: 2024-03-01

## 2024-03-01 RX ORDER — IPRATROPIUM BROMIDE AND ALBUTEROL SULFATE 2.5; .5 MG/3ML; MG/3ML
1 SOLUTION RESPIRATORY (INHALATION)
Status: COMPLETED | OUTPATIENT
Start: 2024-03-01 | End: 2024-03-01

## 2024-03-01 RX ORDER — METOPROLOL SUCCINATE 50 MG/1
50 TABLET, EXTENDED RELEASE ORAL DAILY
Status: DISCONTINUED | OUTPATIENT
Start: 2024-03-02 | End: 2024-03-08

## 2024-03-01 RX ORDER — BUMETANIDE 1 MG/1
2 TABLET ORAL 2 TIMES DAILY
Status: DISCONTINUED | OUTPATIENT
Start: 2024-03-02 | End: 2024-03-06

## 2024-03-01 RX ORDER — ACETAMINOPHEN 650 MG/1
650 SUPPOSITORY RECTAL EVERY 6 HOURS PRN
Status: DISCONTINUED | OUTPATIENT
Start: 2024-03-01 | End: 2024-03-09 | Stop reason: HOSPADM

## 2024-03-01 RX ORDER — ONDANSETRON 2 MG/ML
4 INJECTION INTRAMUSCULAR; INTRAVENOUS EVERY 6 HOURS PRN
Status: DISCONTINUED | OUTPATIENT
Start: 2024-03-01 | End: 2024-03-09 | Stop reason: HOSPADM

## 2024-03-01 RX ORDER — ASPIRIN 81 MG/1
81 TABLET, CHEWABLE ORAL DAILY
Status: DISCONTINUED | OUTPATIENT
Start: 2024-03-02 | End: 2024-03-09 | Stop reason: HOSPADM

## 2024-03-01 RX ORDER — POLYETHYLENE GLYCOL 3350 17 G/17G
17 POWDER, FOR SOLUTION ORAL DAILY PRN
Status: DISCONTINUED | OUTPATIENT
Start: 2024-03-01 | End: 2024-03-09 | Stop reason: HOSPADM

## 2024-03-01 RX ORDER — ASPIRIN 81 MG/1
324 TABLET, CHEWABLE ORAL
Status: COMPLETED | OUTPATIENT
Start: 2024-03-01 | End: 2024-03-01

## 2024-03-01 RX ORDER — LANOLIN ALCOHOL/MO/W.PET/CERES
1000 CREAM (GRAM) TOPICAL DAILY
Status: DISCONTINUED | OUTPATIENT
Start: 2024-03-02 | End: 2024-03-09 | Stop reason: HOSPADM

## 2024-03-01 RX ORDER — FOLIC ACID 1 MG/1
1 TABLET ORAL DAILY
Status: DISCONTINUED | OUTPATIENT
Start: 2024-03-02 | End: 2024-03-09 | Stop reason: HOSPADM

## 2024-03-01 RX ORDER — SODIUM CHLORIDE 9 MG/ML
INJECTION, SOLUTION INTRAVENOUS PRN
Status: DISCONTINUED | OUTPATIENT
Start: 2024-03-01 | End: 2024-03-09 | Stop reason: HOSPADM

## 2024-03-01 RX ORDER — HEPARIN SODIUM 1000 [USP'U]/ML
4000 INJECTION, SOLUTION INTRAVENOUS; SUBCUTANEOUS ONCE
Status: COMPLETED | OUTPATIENT
Start: 2024-03-01 | End: 2024-03-02

## 2024-03-01 RX ORDER — ALLOPURINOL 100 MG/1
100 TABLET ORAL DAILY
Status: DISCONTINUED | OUTPATIENT
Start: 2024-03-02 | End: 2024-03-05

## 2024-03-01 RX ORDER — METOLAZONE 5 MG/1
2.5 TABLET ORAL DAILY
Status: DISCONTINUED | OUTPATIENT
Start: 2024-03-02 | End: 2024-03-03

## 2024-03-01 RX ORDER — ATORVASTATIN CALCIUM 40 MG/1
80 TABLET, FILM COATED ORAL NIGHTLY
Status: DISCONTINUED | OUTPATIENT
Start: 2024-03-02 | End: 2024-03-09 | Stop reason: HOSPADM

## 2024-03-01 RX ORDER — NITROGLYCERIN 0.4 MG/1
0.4 TABLET SUBLINGUAL EVERY 5 MIN PRN
Status: DISCONTINUED | OUTPATIENT
Start: 2024-03-01 | End: 2024-03-09 | Stop reason: HOSPADM

## 2024-03-01 RX ORDER — ALBUTEROL SULFATE 2.5 MG/3ML
SOLUTION RESPIRATORY (INHALATION) EVERY 4 HOURS PRN
Status: DISCONTINUED | OUTPATIENT
Start: 2024-03-01 | End: 2024-03-09 | Stop reason: HOSPADM

## 2024-03-01 RX ORDER — GABAPENTIN 400 MG/1
400 CAPSULE ORAL EVERY EVENING
Status: DISCONTINUED | OUTPATIENT
Start: 2024-03-02 | End: 2024-03-09 | Stop reason: HOSPADM

## 2024-03-01 RX ORDER — HEPARIN SODIUM 1000 [USP'U]/ML
4000 INJECTION, SOLUTION INTRAVENOUS; SUBCUTANEOUS PRN
Status: DISCONTINUED | OUTPATIENT
Start: 2024-03-01 | End: 2024-03-06

## 2024-03-01 RX ORDER — ONDANSETRON 4 MG/1
4 TABLET, ORALLY DISINTEGRATING ORAL EVERY 8 HOURS PRN
Status: DISCONTINUED | OUTPATIENT
Start: 2024-03-01 | End: 2024-03-09 | Stop reason: HOSPADM

## 2024-03-01 RX ADMIN — IPRATROPIUM BROMIDE AND ALBUTEROL SULFATE 1 DOSE: .5; 3 SOLUTION RESPIRATORY (INHALATION) at 21:21

## 2024-03-01 RX ADMIN — ASPIRIN 81 MG CHEWABLE TABLET 324 MG: 81 TABLET CHEWABLE at 21:20

## 2024-03-01 ASSESSMENT — PAIN SCALES - GENERAL: PAINLEVEL_OUTOF10: 10

## 2024-03-01 ASSESSMENT — PAIN - FUNCTIONAL ASSESSMENT: PAIN_FUNCTIONAL_ASSESSMENT: 0-10

## 2024-03-01 ASSESSMENT — PAIN DESCRIPTION - LOCATION: LOCATION: HEAD;CHEST

## 2024-03-02 ENCOUNTER — HOME CARE VISIT (OUTPATIENT)
Age: 74
End: 2024-03-02
Payer: MEDICARE

## 2024-03-02 ENCOUNTER — APPOINTMENT (OUTPATIENT)
Facility: HOSPITAL | Age: 74
DRG: 280 | End: 2024-03-02
Payer: MEDICARE

## 2024-03-02 PROBLEM — D64.9 CHRONIC ANEMIA: Status: ACTIVE | Noted: 2024-03-02

## 2024-03-02 PROBLEM — J96.11 CHRONIC HYPOXIC RESPIRATORY FAILURE (HCC): Status: ACTIVE | Noted: 2024-03-02

## 2024-03-02 LAB
APPEARANCE UR: CLEAR
APTT PPP: 158.3 SEC (ref 23–36.4)
APTT PPP: 26.1 SEC (ref 23–36.4)
APTT PPP: 42.2 SEC (ref 23–36.4)
APTT PPP: >180 SEC (ref 23–36.4)
BILIRUB UR QL: NEGATIVE
COLOR UR: YELLOW
EKG ATRIAL RATE: 105 BPM
EKG DIAGNOSIS: NORMAL
EKG P AXIS: 80 DEGREES
EKG P-R INTERVAL: 154 MS
EKG Q-T INTERVAL: 342 MS
EKG QRS DURATION: 82 MS
EKG QTC CALCULATION (BAZETT): 452 MS
EKG R AXIS: 18 DEGREES
EKG T AXIS: 55 DEGREES
EKG VENTRICULAR RATE: 105 BPM
GLUCOSE BLD STRIP.AUTO-MCNC: 148 MG/DL (ref 70–110)
GLUCOSE BLD STRIP.AUTO-MCNC: 178 MG/DL (ref 70–110)
GLUCOSE BLD STRIP.AUTO-MCNC: 197 MG/DL (ref 70–110)
GLUCOSE BLD STRIP.AUTO-MCNC: 204 MG/DL (ref 70–110)
GLUCOSE BLD STRIP.AUTO-MCNC: 209 MG/DL (ref 70–110)
GLUCOSE UR STRIP.AUTO-MCNC: NEGATIVE MG/DL
HGB UR QL STRIP: NEGATIVE
HISTORY CHECK: NORMAL
KETONES UR QL STRIP.AUTO: NEGATIVE MG/DL
LEUKOCYTE ESTERASE UR QL STRIP.AUTO: NEGATIVE
NITRITE UR QL STRIP.AUTO: NEGATIVE
PH UR STRIP: 5.5 (ref 5–8)
PROT UR STRIP-MCNC: NEGATIVE MG/DL
SP GR UR REFRACTOMETRY: 1.01 (ref 1–1.03)
TROPONIN I SERPL HS-MCNC: 154 NG/L (ref 0–54)
UROBILINOGEN UR QL STRIP.AUTO: 0.2 EU/DL (ref 0.2–1)

## 2024-03-02 PROCEDURE — 2700000000 HC OXYGEN THERAPY PER DAY

## 2024-03-02 PROCEDURE — 6360000002 HC RX W HCPCS: Performed by: STUDENT IN AN ORGANIZED HEALTH CARE EDUCATION/TRAINING PROGRAM

## 2024-03-02 PROCEDURE — 86900 BLOOD TYPING SEROLOGIC ABO: CPT

## 2024-03-02 PROCEDURE — 3430000000 HC RX DIAGNOSTIC RADIOPHARMACEUTICAL: Performed by: STUDENT IN AN ORGANIZED HEALTH CARE EDUCATION/TRAINING PROGRAM

## 2024-03-02 PROCEDURE — 71045 X-RAY EXAM CHEST 1 VIEW: CPT

## 2024-03-02 PROCEDURE — 93010 ELECTROCARDIOGRAM REPORT: CPT | Performed by: INTERNAL MEDICINE

## 2024-03-02 PROCEDURE — 6370000000 HC RX 637 (ALT 250 FOR IP): Performed by: HOSPITALIST

## 2024-03-02 PROCEDURE — 6370000000 HC RX 637 (ALT 250 FOR IP): Performed by: STUDENT IN AN ORGANIZED HEALTH CARE EDUCATION/TRAINING PROGRAM

## 2024-03-02 PROCEDURE — 94761 N-INVAS EAR/PLS OXIMETRY MLT: CPT

## 2024-03-02 PROCEDURE — 85730 THROMBOPLASTIN TIME PARTIAL: CPT

## 2024-03-02 PROCEDURE — 82962 GLUCOSE BLOOD TEST: CPT

## 2024-03-02 PROCEDURE — 81003 URINALYSIS AUTO W/O SCOPE: CPT

## 2024-03-02 PROCEDURE — 02HV33Z INSERTION OF INFUSION DEVICE INTO SUPERIOR VENA CAVA, PERCUTANEOUS APPROACH: ICD-10-PCS | Performed by: INTERNAL MEDICINE

## 2024-03-02 PROCEDURE — 86901 BLOOD TYPING SEROLOGIC RH(D): CPT

## 2024-03-02 PROCEDURE — 36430 TRANSFUSION BLD/BLD COMPNT: CPT

## 2024-03-02 PROCEDURE — 86923 COMPATIBILITY TEST ELECTRIC: CPT

## 2024-03-02 PROCEDURE — A9539 TC99M PENTETATE: HCPCS | Performed by: STUDENT IN AN ORGANIZED HEALTH CARE EDUCATION/TRAINING PROGRAM

## 2024-03-02 PROCEDURE — 36415 COLL VENOUS BLD VENIPUNCTURE: CPT

## 2024-03-02 PROCEDURE — 86850 RBC ANTIBODY SCREEN: CPT

## 2024-03-02 PROCEDURE — 6360000002 HC RX W HCPCS: Performed by: HOSPITALIST

## 2024-03-02 PROCEDURE — P9016 RBC LEUKOCYTES REDUCED: HCPCS

## 2024-03-02 PROCEDURE — 2580000003 HC RX 258: Performed by: STUDENT IN AN ORGANIZED HEALTH CARE EDUCATION/TRAINING PROGRAM

## 2024-03-02 PROCEDURE — 94640 AIRWAY INHALATION TREATMENT: CPT

## 2024-03-02 PROCEDURE — 99232 SBSQ HOSP IP/OBS MODERATE 35: CPT | Performed by: NURSE PRACTITIONER

## 2024-03-02 PROCEDURE — 1100000003 HC PRIVATE W/ TELEMETRY

## 2024-03-02 RX ORDER — INSULIN GLARGINE 100 [IU]/ML
15 INJECTION, SOLUTION SUBCUTANEOUS NIGHTLY
Status: DISCONTINUED | OUTPATIENT
Start: 2024-03-02 | End: 2024-03-02

## 2024-03-02 RX ORDER — MORPHINE SULFATE 2 MG/ML
1 INJECTION, SOLUTION INTRAMUSCULAR; INTRAVENOUS ONCE
Status: COMPLETED | OUTPATIENT
Start: 2024-03-02 | End: 2024-03-02

## 2024-03-02 RX ORDER — MORPHINE SULFATE 2 MG/ML
2 INJECTION, SOLUTION INTRAMUSCULAR; INTRAVENOUS
Status: COMPLETED | OUTPATIENT
Start: 2024-03-02 | End: 2024-03-02

## 2024-03-02 RX ORDER — ARFORMOTEROL TARTRATE 15 UG/2ML
15 SOLUTION RESPIRATORY (INHALATION)
Status: DISCONTINUED | OUTPATIENT
Start: 2024-03-02 | End: 2024-03-06

## 2024-03-02 RX ORDER — KIT FOR THE PREPARATION OF TECHNETIUM TC 99M PENTETATE 20 MG/1
30 INJECTION, POWDER, LYOPHILIZED, FOR SOLUTION INTRAVENOUS; RESPIRATORY (INHALATION)
Status: COMPLETED | OUTPATIENT
Start: 2024-03-02 | End: 2024-03-02

## 2024-03-02 RX ORDER — INSULIN LISPRO 100 [IU]/ML
0-4 INJECTION, SOLUTION INTRAVENOUS; SUBCUTANEOUS NIGHTLY
Status: DISCONTINUED | OUTPATIENT
Start: 2024-03-02 | End: 2024-03-09 | Stop reason: HOSPADM

## 2024-03-02 RX ORDER — DEXTROSE MONOHYDRATE 100 MG/ML
INJECTION, SOLUTION INTRAVENOUS CONTINUOUS PRN
Status: DISCONTINUED | OUTPATIENT
Start: 2024-03-02 | End: 2024-03-09 | Stop reason: HOSPADM

## 2024-03-02 RX ORDER — TRAMADOL HYDROCHLORIDE 50 MG/1
25 TABLET ORAL EVERY 8 HOURS PRN
Status: DISCONTINUED | OUTPATIENT
Start: 2024-03-02 | End: 2024-03-09 | Stop reason: HOSPADM

## 2024-03-02 RX ORDER — BUMETANIDE 0.25 MG/ML
2 INJECTION INTRAMUSCULAR; INTRAVENOUS ONCE
Status: COMPLETED | OUTPATIENT
Start: 2024-03-02 | End: 2024-03-02

## 2024-03-02 RX ORDER — INSULIN GLARGINE 100 [IU]/ML
4 INJECTION, SOLUTION SUBCUTANEOUS NIGHTLY
Status: DISCONTINUED | OUTPATIENT
Start: 2024-03-02 | End: 2024-03-09 | Stop reason: HOSPADM

## 2024-03-02 RX ORDER — INSULIN LISPRO 100 [IU]/ML
0-4 INJECTION, SOLUTION INTRAVENOUS; SUBCUTANEOUS
Status: DISCONTINUED | OUTPATIENT
Start: 2024-03-02 | End: 2024-03-09 | Stop reason: HOSPADM

## 2024-03-02 RX ORDER — SODIUM CHLORIDE 9 MG/ML
INJECTION, SOLUTION INTRAVENOUS PRN
Status: DISCONTINUED | OUTPATIENT
Start: 2024-03-02 | End: 2024-03-09 | Stop reason: HOSPADM

## 2024-03-02 RX ORDER — IPRATROPIUM BROMIDE AND ALBUTEROL SULFATE 2.5; .5 MG/3ML; MG/3ML
1 SOLUTION RESPIRATORY (INHALATION)
Status: DISCONTINUED | OUTPATIENT
Start: 2024-03-02 | End: 2024-03-03

## 2024-03-02 RX ADMIN — SODIUM CHLORIDE, PRESERVATIVE FREE 10 ML: 5 INJECTION INTRAVENOUS at 20:12

## 2024-03-02 RX ADMIN — BUMETANIDE 2 MG: 1 TABLET ORAL at 14:29

## 2024-03-02 RX ADMIN — BUMETANIDE 2 MG: 1 TABLET ORAL at 21:02

## 2024-03-02 RX ADMIN — INSULIN GLARGINE 15 UNITS: 100 INJECTION, SOLUTION SUBCUTANEOUS at 02:04

## 2024-03-02 RX ADMIN — FAMOTIDINE 20 MG: 20 TABLET ORAL at 14:30

## 2024-03-02 RX ADMIN — MORPHINE SULFATE 1 MG: 2 INJECTION, SOLUTION INTRAMUSCULAR; INTRAVENOUS at 20:10

## 2024-03-02 RX ADMIN — IPRATROPIUM BROMIDE AND ALBUTEROL SULFATE 1 DOSE: .5; 3 SOLUTION RESPIRATORY (INHALATION) at 08:03

## 2024-03-02 RX ADMIN — METOLAZONE 2.5 MG: 5 TABLET ORAL at 14:30

## 2024-03-02 RX ADMIN — HEPARIN SODIUM 11 UNITS/KG/HR: 10000 INJECTION, SOLUTION INTRAVENOUS at 05:17

## 2024-03-02 RX ADMIN — BUMETANIDE 2 MG: 1 TABLET ORAL at 05:29

## 2024-03-02 RX ADMIN — SODIUM CHLORIDE, PRESERVATIVE FREE 10 ML: 5 INJECTION INTRAVENOUS at 14:38

## 2024-03-02 RX ADMIN — TRAMADOL HYDROCHLORIDE 25 MG: 50 TABLET ORAL at 14:30

## 2024-03-02 RX ADMIN — ATORVASTATIN CALCIUM 80 MG: 40 TABLET, FILM COATED ORAL at 02:04

## 2024-03-02 RX ADMIN — ARFORMOTEROL TARTRATE 15 MCG: 15 SOLUTION RESPIRATORY (INHALATION) at 20:06

## 2024-03-02 RX ADMIN — IPRATROPIUM BROMIDE AND ALBUTEROL SULFATE 1 DOSE: .5; 3 SOLUTION RESPIRATORY (INHALATION) at 12:15

## 2024-03-02 RX ADMIN — GABAPENTIN 400 MG: 400 CAPSULE ORAL at 02:05

## 2024-03-02 RX ADMIN — IPRATROPIUM BROMIDE AND ALBUTEROL SULFATE 1 DOSE: .5; 3 SOLUTION RESPIRATORY (INHALATION) at 20:06

## 2024-03-02 RX ADMIN — BUMETANIDE 2 MG: 0.25 INJECTION INTRAMUSCULAR; INTRAVENOUS at 02:04

## 2024-03-02 RX ADMIN — Medication 30 MILLICURIE: at 00:56

## 2024-03-02 RX ADMIN — ALLOPURINOL 100 MG: 100 TABLET ORAL at 14:29

## 2024-03-02 RX ADMIN — NITROGLYCERIN 0.5 INCH: 20 OINTMENT TOPICAL at 02:05

## 2024-03-02 RX ADMIN — HEPARIN SODIUM 4000 UNITS: 1000 INJECTION INTRAVENOUS; SUBCUTANEOUS at 05:13

## 2024-03-02 RX ADMIN — Medication 5.5 MILLICURIE: at 00:56

## 2024-03-02 RX ADMIN — METOPROLOL SUCCINATE 50 MG: 50 TABLET, EXTENDED RELEASE ORAL at 14:31

## 2024-03-02 RX ADMIN — CYANOCOBALAMIN TAB 1000 MCG 1000 MCG: 1000 TAB at 14:30

## 2024-03-02 RX ADMIN — GABAPENTIN 400 MG: 400 CAPSULE ORAL at 18:09

## 2024-03-02 RX ADMIN — MORPHINE SULFATE 2 MG: 2 INJECTION, SOLUTION INTRAMUSCULAR; INTRAVENOUS at 07:39

## 2024-03-02 RX ADMIN — ATORVASTATIN CALCIUM 80 MG: 40 TABLET, FILM COATED ORAL at 21:02

## 2024-03-02 RX ADMIN — FOLIC ACID 1 MG: 1 TABLET ORAL at 14:31

## 2024-03-02 RX ADMIN — HEPARIN SODIUM 4000 UNITS: 1000 INJECTION INTRAVENOUS; SUBCUTANEOUS at 16:53

## 2024-03-02 RX ADMIN — INSULIN GLARGINE 4 UNITS: 100 INJECTION, SOLUTION SUBCUTANEOUS at 21:05

## 2024-03-02 RX ADMIN — ACETAMINOPHEN 325MG 650 MG: 325 TABLET ORAL at 05:30

## 2024-03-02 RX ADMIN — ASPIRIN 81 MG CHEWABLE TABLET 81 MG: 81 TABLET CHEWABLE at 14:29

## 2024-03-02 RX ADMIN — ARFORMOTEROL TARTRATE 15 MCG: 15 SOLUTION RESPIRATORY (INHALATION) at 08:03

## 2024-03-02 RX ADMIN — INSULIN LISPRO 1 UNITS: 100 INJECTION, SOLUTION INTRAVENOUS; SUBCUTANEOUS at 18:03

## 2024-03-02 ASSESSMENT — PAIN DESCRIPTION - ORIENTATION
ORIENTATION: RIGHT;DISTAL
ORIENTATION: RIGHT;LEFT
ORIENTATION: RIGHT
ORIENTATION: DISTAL;RIGHT

## 2024-03-02 ASSESSMENT — PAIN DESCRIPTION - LOCATION
LOCATION: FINGER (COMMENT WHICH ONE);NECK
LOCATION: NECK;HAND
LOCATION: GENERALIZED
LOCATION: NECK
LOCATION: FINGER (COMMENT WHICH ONE);NECK

## 2024-03-02 ASSESSMENT — PAIN DESCRIPTION - DESCRIPTORS
DESCRIPTORS: CRAMPING;STABBING
DESCRIPTORS: ACHING

## 2024-03-02 ASSESSMENT — PAIN SCALES - GENERAL
PAINLEVEL_OUTOF10: 10

## 2024-03-02 ASSESSMENT — PAIN - FUNCTIONAL ASSESSMENT: PAIN_FUNCTIONAL_ASSESSMENT: ACTIVITIES ARE NOT PREVENTED

## 2024-03-02 NOTE — ACP (ADVANCE CARE PLANNING)
Patient seen in bed 5 of the emergency room where she will be admitted to the hospital due to an Nstemi.  Patient has an advance directive on file where she has a power of . Patient in good spirits.

## 2024-03-02 NOTE — ED NOTES
[facility] EMERGENCY DEPT  EMERGENCY DEPARTMENT ENCOUNTER       Pt Name: Christy Donahue  MRN: [id number]  Birthdate 1950  Date of evaluation: 3/1/2024  Provider: Tiago Conroy DO   PCP: Kalli Louise APRN - NP  Note Started: 3:34 AM EST 3/2/24         PROCEDURES   Unless otherwise noted below, none  Central Line    Date/Time: 3/2/2024 3:34 AM    Performed by: Tiago Conroy DO  Authorized by: Priti Whitehead MD    Consent:     Consent obtained:  Written    Consent given by:  Patient    Risks, benefits, and alternatives were discussed: yes      Risks discussed:  Arterial puncture, incorrect placement, bleeding and infection    Alternatives discussed:  No treatment  Universal protocol:     Procedure explained and questions answered to patient or proxy's satisfaction: yes      Relevant documents present and verified: yes      Test results available: yes      Imaging studies available: yes      Required blood products, implants, devices, and special equipment available: yes      Site/side marked: yes      Immediately prior to procedure, a time out was called: yes      Patient identity confirmed:  Verbally with patient and arm band  Pre-procedure details:     Indication(s): central venous access and insufficient peripheral access      Hand hygiene: Hand hygiene performed prior to insertion      Sterile barrier technique: All elements of maximal sterile technique followed      Skin preparation:  Chlorhexidine    Skin preparation agent: Skin preparation agent completely dried prior to procedure    Sedation:     Sedation type:  None  Anesthesia:     Anesthesia method:  Local infiltration    Local anesthetic:  Lidocaine 1% w/o epi  Procedure details:     Location:  R internal jugular    Patient position:  Supine    Procedural supplies:  Triple lumen    Catheter size:  7 Fr    Landmarks identified: yes      Ultrasound guidance: yes      Ultrasound guidance timing: prior to insertion and real time      Sterile

## 2024-03-02 NOTE — H&P
tablet Take 1 tablet by mouth 2 times daily 1/2/24   Tash Wolf MD   aspirin 81 MG chewable tablet Take 1 tablet by mouth daily 12/17/23   Shar Interiano MD   apixaban (ELIQUIS) 5 MG TABS tablet Take 1 tablet by mouth 2 times daily 12/22/23 3/21/24  Shar Interiano MD   Oxygen Concentrator Inhale 2 L into the lungs continuous.    ProviderSocorro MD   gabapentin (NEURONTIN) 400 MG capsule Take 1 capsule by mouth every evening.    ProviderSocorro MD   insulin detemir (LEVEMIR) 100 UNIT/ML injection pen Inject 15 Units into the skin nightly    ProviderSocorro MD   nitroGLYCERIN (NITROSTAT) 0.4 MG SL tablet Place 1 tablet under the tongue every 5 minutes as needed for Chest pain up to max of 3 total doses. If no relief after 1 dose, call 911. 8/11/23   Dena Vail MD   atorvastatin (LIPITOR) 40 MG tablet Take 1 tablet by mouth nightly 8/11/23   Dena Vail MD   albuterol sulfate HFA (PROVENTIL;VENTOLIN;PROAIR) 108 (90 Base) MCG/ACT inhaler Inhale 2 puffs into the lungs every 6 hours as needed 2/3/23   Automatic Reconciliation, Ar   Umeclidinium-Vilanterol 62.5-25 MCG/ACT AEPB Inhale 1 puff into the lungs daily 4/26/17   Automatic Reconciliation, Ar       No Known Allergies    Review of Systems:    Pertinent Positives noted in HPI.  Rest all other ROS were noted to be negative.     A comprehensive review of systems was negative except for that written in the History of Present Illness.             Physical Exam:      Visit Vitals  BP (!) 171/70   Pulse 96   Temp 97.3 °F (36.3 °C) (Oral)   Resp 20   SpO2 100%       Physical Exam:    Gen: In general, this is an obese, disheveled female in no acute distress  HEENT: Sclerae nonicteric. Oral mucous membranes moist. Dentition normal  Neck: Supple with midline trachea.   CV: RRR without murmur or rub appreciated.   Resp:Respirations are unlabored without use of accessory muscles. Wheezing in mid-upper lung fields, minimal bilateral rales in     Alk Phosphatase 114 45 - 117 U/L    Total Protein 5.9 (L) 6.4 - 8.2 g/dL    Albumin 3.4 3.4 - 5.0 g/dL    Globulin 2.5 2.0 - 4.0 g/dL    Albumin/Globulin Ratio 1.4 0.8 - 1.7     Magnesium    Collection Time: 03/01/24  7:55 PM   Result Value Ref Range    Magnesium 1.7 1.6 - 2.6 mg/dL   Lipase    Collection Time: 03/01/24  7:55 PM   Result Value Ref Range    Lipase 38 13 - 75 U/L   Troponin Now and Q 1 Hour    Collection Time: 03/01/24  7:55 PM   Result Value Ref Range    Troponin, High Sensitivity 139 (HH) 0 - 54 ng/L   Brain Natriuretic Peptide    Collection Time: 03/01/24  7:55 PM   Result Value Ref Range    NT Pro-BNP 5,212 (H) 0 - 900 PG/ML   Procalcitonin    Collection Time: 03/01/24  7:55 PM   Result Value Ref Range    Procalcitonin 0.08 ng/mL   Protime-INR    Collection Time: 03/01/24  7:55 PM   Result Value Ref Range    Protime 17.1 (H) 11.9 - 14.7 sec    INR 1.4 (H) 0.9 - 1.1     COVID-19 & Influenza Combo    Collection Time: 03/01/24  9:23 PM    Specimen: Nasopharyngeal   Result Value Ref Range    SARS-CoV-2, PCR Not detected NOTD      Rapid Influenza A By PCR Not detected NOTD      Rapid Influenza B By PCR Not detected NOTD     Troponin Now and Q 1 Hour    Collection Time: 03/01/24  9:45 PM   Result Value Ref Range    Troponin, High Sensitivity 167 (HH) 0 - 54 ng/L       Imaging Reviewed:    XR Results (most recent):  Xray Chest: read pending    V/Q Scan: Read pending      Johnny Jones MD  3/2/2024, 12:27 AM

## 2024-03-02 NOTE — PROGRESS NOTES
[facility] Hospitalist [facility]  Progress Note    Patient: Christy Donahue Age: 73 y.o. : 1950 MR#: [id number] SSN: [id number]  Date: 3/2/2024     Subjective:   Chief Complaint on Admission: shortness of breath    Reports shortness of breath improved, denies CP, states some discomfort to IV to right side of her neck and wants to eat.  Also complaining of cramping to extremities.    Assessment/Plan:   Acute decompensated heart failure -diuresis as per nephrology, strict I's and O's.  Net output -1.3 L Last echo with EF of 40 to 45%  CKD stage IV previously on hemodialysis -followed by nephrology and Dr. Márquez consult appreciated patient deferred consideration for resumption of dialysis at this time  Acute on chronic hypoxic respiratory failure - improved and currently on home fluid with 3 L nasal cannula  Acute on chronic anemia -no evidence of acute bleeding concerns, in setting of kidney disease, monitor for any bleeding issues  Acute nonocclusive DVT in the left popliteal and posterior tibial veins, chronic nonocclusive DVT in the left peroneal vein -hold Eliquis, continue heparin drip  Type 2 diabetes with hyperglycemia -continue low-dose Lantus and sliding scale, last A1c 5.0 as of 2024  Chronic pain - cont neurontin (monitor closely r/t #2), tramadol PRN    Discussed plan of care with patient and she has no questions at this time    Additional Notes:      Case discussed with:  [x]Patient  []Family  [x]Nursing  []Case Management  DVT Prophylaxis:  []Lovenox  []Hep SQ  []SCDs  []Coumadin / Eliquis or Xarelto   [x]On Heparin gtt    Objective:     VS: /70   Pulse 88   Temp 98.3 °F (36.8 °C) (Oral)   Resp 18   Ht 1.626 m (5' 4\")   Wt 85.3 kg (188 lb)   SpO2 98%   BMI 32.27 kg/m²    Tmax/24hrs: Temp (24hrs), Av °F (36.7 °C), Min:97.3 °F (36.3 °C), Max:98.3 °F (36.8 °C)    Intake/Output Summary (Last 24 hours) at 3/2/2024 144  Last data filed at 3/2/2024 0411  Gross  AST 10 10 - 38 U/L    Alk Phosphatase 114 45 - 117 U/L    Total Protein 5.9 (L) 6.4 - 8.2 g/dL    Albumin 3.4 3.4 - 5.0 g/dL    Globulin 2.5 2.0 - 4.0 g/dL    Albumin/Globulin Ratio 1.4 0.8 - 1.7     Magnesium    Collection Time: 03/01/24  7:55 PM   Result Value Ref Range    Magnesium 1.7 1.6 - 2.6 mg/dL   Lipase    Collection Time: 03/01/24  7:55 PM   Result Value Ref Range    Lipase 38 13 - 75 U/L   Troponin Now and Q 1 Hour    Collection Time: 03/01/24  7:55 PM   Result Value Ref Range    Troponin, High Sensitivity 139 (HH) 0 - 54 ng/L   Brain Natriuretic Peptide    Collection Time: 03/01/24  7:55 PM   Result Value Ref Range    NT Pro-BNP 5,212 (H) 0 - 900 PG/ML   Procalcitonin    Collection Time: 03/01/24  7:55 PM   Result Value Ref Range    Procalcitonin 0.08 ng/mL   Protime-INR    Collection Time: 03/01/24  7:55 PM   Result Value Ref Range    Protime 17.1 (H) 11.9 - 14.7 sec    INR 1.4 (H) 0.9 - 1.1     COVID-19 & Influenza Combo    Collection Time: 03/01/24  9:23 PM    Specimen: Nasopharyngeal   Result Value Ref Range    SARS-CoV-2, PCR Not detected NOTD      Rapid Influenza A By PCR Not detected NOTD      Rapid Influenza B By PCR Not detected NOTD     Troponin Now and Q 1 Hour    Collection Time: 03/01/24  9:45 PM   Result Value Ref Range    Troponin, High Sensitivity 167 (HH) 0 - 54 ng/L   Troponin    Collection Time: 03/01/24 10:40 PM   Result Value Ref Range    Troponin, High Sensitivity 154 (HH) 0 - 54 ng/L   Urinalysis    Collection Time: 03/02/24 12:45 AM   Result Value Ref Range    Color, UA YELLOW      Appearance CLEAR      Specific Gravity, UA 1.009 1.005 - 1.030      pH, Urine 5.5 5.0 - 8.0      Protein, UA Negative NEG mg/dL    Glucose, UA Negative NEG mg/dL    Ketones, Urine Negative NEG mg/dL    Bilirubin Urine Negative NEG      Blood, Urine Negative NEG      Urobilinogen, Urine 0.2 0.2 - 1.0 EU/dL    Nitrite, Urine Negative NEG      Leukocyte Esterase, Urine Negative NEG     PREPARE RBC

## 2024-03-02 NOTE — CONSULTS
Consult Note  Consult requested by: dr brittany Harrison Rowan is a 73 y.o. female Black /  who is being seen on consult for crf  No chief complaint on file.    Admission diagnosis: Normocytic anemia [D64.9]  Essential hypertension [I10]  NSTEMI (non-ST elevated myocardial infarction) ([facility]) [I21.4]  Elevated brain natriuretic peptide (BNP) level [R79.89]  On anticoagulant therapy [Z79.01]  Acute decompensated heart failure ([facility]) [I50.9]  Acute kidney injury superimposed on CKD ([facility]) [N17.9, N18.9]  Deep vein thrombosis (DVT) of proximal lower extremity, unspecified chronicity, unspecified laterality ([facility]) [I82.4Y9]  Acute on chronic congestive heart failure, unspecified heart failure type ([facility]) [I50.9]      HPI: 73 y o  female admitted with sob ,cp.pt has hx of crf stage 4,anemia,copd,has frequent admissions to the hospital for sob,used to dialyze ,but refused to continue dialysis as renal function improved  Past Medical History:   Diagnosis Date    Anemia     Chondromalacia of both patellae     Chronic heart failure with preserved ejection fraction ([facility]) 01/13/2023    Chronic kidney disease     COPD (chronic obstructive pulmonary disease) ([facility]) 09/2015    mild-mod dz; Dr DEBORAH Yepez    Diabetes ([facility]) 2013    Dilated cardiomyopathy ([facility])     Dyslipidemia     Gout     Heart attack ([facility]) 10/18/2019    Stented    Hypercholesteremia 01/08/2014    Hypertension 2000    Mild protein-calorie malnutrition ([facility]) 8/7/2023    Obesity     Orthostatic hypotension 05/17/2016    Osteoarthritis of both knees     Popliteal cyst     Sleep apnea     not using cpap    Stage 3b chronic kidney disease ([facility]) 08/08/2022    Vitamin D deficiency 10/16/2014      Past Surgical History:   Procedure Laterality Date    CARDIAC CATHETERIZATION  2019    Stents x 2    CARDIAC PROCEDURE N/A 8/9/2023    Left heart cath performed by Mani King MD at [facility] CARDIAC CATH LAB    CARDIAC PROCEDURE N/A 8/9/2023    Coronary  · Continue pantoprazole

## 2024-03-02 NOTE — PROGRESS NOTES
completed the initial Spiritual Assessment of the patient, and offered Pastoral Care support to the patient in bed 5 of the emergency room. There is an advance directive on file. Patient does not have any Adventist/cultural needs that will affect patient’s preferences in health care. Chaplains will continue to follow and will provide pastoral care on an as needed/requested basis.    Chaplain Liu Fink  Board Certified   Spiritual Care Department  [phone]

## 2024-03-02 NOTE — ED NOTES
Assumed care of pt from night nurse. Pt is AxOx4, hungry, blood infusing at 125 ml/hr, c/o pain. Administering morphine now per MAR.

## 2024-03-02 NOTE — PROGRESS NOTES
TRANSFER - IN REPORT:    03:09-Verbal report received from Jessica KNOX on Christy Donahue  being received from ED for routine progression of patient care      Report consisted of patient's Situation, Background, Assessment and   Recommendations(SBAR).     Information from the following report(s) ED SBAR and MAR was reviewed with the receiving nurse.    Blood pressure 138/63, pulse 99, temperature 97.3 °F (36.3 °C), temperature source Oral, resp. rate 18, SpO2 100 % on 3L-NC.     Pt with orders for Heparin gtt pending central line access prior to transfer.     07:45-Verbal report given to oncoming nurseDeborah RN on Ms. Donahue. Pt still with pending admission going into  404. ED to call with updated SBAR.

## 2024-03-02 NOTE — ED NOTES
Pt has been bleeding from central line placement area, provider reviewed xrays to confirm placement again, pressure held for many minutes, quick clot placed around site and large tegaderm placed.

## 2024-03-02 NOTE — ED PROVIDER NOTES
[facility] EMERGENCY DEPT  EMERGENCY DEPARTMENT ENCOUNTER       Pt Name: Christy Donahue  MRN: [id number]  Birthdate 1950  Date of evaluation: 3/1/2024  Provider: THANH FARR MD   PCP: Kalli Louise APRN - NP  Note Started: 1:07 AM 3/1/24     CHIEF COMPLAINT      Shortness of breath       HISTORY OF PRESENT ILLNESS: 1 or more elements      History From: Patient and EMS  None     Christy Donahue is a 73 y.o. female who presents for shortness of breath.  Patient states that over the last 24 hours she has had worsening shortness of breath.  She initially thought that her symptoms were due to COPD and was attempting to use her inhaler but had no improvement in symptoms after inhaler use.  She is denying change in her baseline cough, denies new sputum production.  Patient also denying infectious symptoms to include fever and upper respiratory congestion.  Patient states that her shortness of breath has been constant for 24 hours and is not associated with chest pain.  Patient states that she does have exertional chest pain at baseline but this has been unchanged over the last several days.  Patient reports that she was seen and admitted to the hospital in the last month for similar symptoms     Nursing Notes were all reviewed and agreed with or any disagreements were addressed in the HPI.     REVIEW OF SYSTEMS      Review of Systems     Positives and Pertinent negatives as per HPI.    PAST HISTORY     Past Medical History:  Past Medical History:   Diagnosis Date    Anemia     Chondromalacia of both patellae     Chronic heart failure with preserved ejection fraction ([facility]) 01/13/2023    Chronic kidney disease     COPD (chronic obstructive pulmonary disease) ([facility]) 09/2015    mild-mod dz; Dr DEBORAH Yepez    Diabetes ([facility]) 2013    Dilated cardiomyopathy ([facility])     Dyslipidemia     Gout     Heart attack ([facility]) 10/18/2019    Stented    Hypercholesteremia 01/08/2014    Hypertension 2000    Mild protein-calorie malnutrition  ([facility]) 2023    Obesity     Orthostatic hypotension 2016    Osteoarthritis of both knees     Popliteal cyst     Sleep apnea     not using cpap    Stage 3b chronic kidney disease ([facility]) 2022    Vitamin D deficiency 10/16/2014         Past Surgical History:  Past Surgical History:   Procedure Laterality Date    CARDIAC CATHETERIZATION      Stents x 2    CARDIAC PROCEDURE N/A 2023    Left heart cath performed by Mani King MD at [facility] CARDIAC CATH LAB    CARDIAC PROCEDURE N/A 2023    Coronary angiography performed by Mani King MD at [facility] CARDIAC CATH LAB    COLONOSCOPY N/A 06/15/2023    COLONOSCOPY performed by Michael Garcia MD at [facility] ENDOSCOPY    COLOSTOMY  2023    DIALYSIS FISTULA CREATION Left 03/10/2023    LEFT ARM BRACHIAL ARTERY TO AXILLARY VEIN ARTERIO VENOUS GRAFT WITH ARTEGRAFT performed by Sanket Farmer MD at [facility] CARDIAC SURGERY    IR TUNNELED CATHETER PLACEMENT GREATER THAN 5 YEARS  2022    IR TUNNELED CATHETER PLACEMENT GREATER THAN 5 YEARS 2022 [facility] RAD ANGIO IR    IR TUNNELED CATHETER PLACEMENT GREATER THAN 5 YEARS  2022    NERVE BLOCK Left 03/10/2023    NERVE BLOCK (DEFINE) performed by Sanket Farmer MD at [facility] CARDIAC SURGERY    OTHER SURGICAL HISTORY  2023    Exploratory laparotomy, sigmoid colectomy, and descending colostomy    SMALL INTESTINE SURGERY N/A 2023    EXPLORATORY LAPAROTOMY; LYSIS OF EXTENSIVE ADHESION, PARTIAL COLECTOMY, COLOSTOMY TAKEDOWN performed by Adelso Ga MD at [facility] MAIN OR    TUBAL LIGATION         Family History:  Family History   Problem Relation Age of Onset    Diabetes Mother     Hypertension Mother     Hypertension Father     Kidney Disease Maternal Aunt 50        Dialysis       Social History:  Social History     Tobacco Use    Smoking status: Former     Current packs/day: 0.00     Types: Cigarettes     Quit date: 2019     Years since quittin.7    Smokeless tobacco: Never   Vaping

## 2024-03-03 ENCOUNTER — HOME CARE VISIT (OUTPATIENT)
Age: 74
End: 2024-03-03
Payer: MEDICARE

## 2024-03-03 LAB
ABO + RH BLD: NORMAL
ANION GAP SERPL CALC-SCNC: 11 MMOL/L (ref 3–18)
APTT PPP: 120.9 SEC (ref 23–36.4)
APTT PPP: 55.4 SEC (ref 23–36.4)
APTT PPP: 55.4 SEC (ref 23–36.4)
APTT PPP: 81.1 SEC (ref 23–36.4)
BASOPHILS # BLD: 0 K/UL (ref 0–0.1)
BASOPHILS NFR BLD: 0 % (ref 0–2)
BLD PROD TYP BPU: NORMAL
BLOOD BANK DISPENSE STATUS: NORMAL
BLOOD GROUP ANTIBODIES SERPL: NORMAL
BPU ID: NORMAL
BUN SERPL-MCNC: 100 MG/DL (ref 7–18)
BUN/CREAT SERPL: 29 (ref 12–20)
CALCIUM SERPL-MCNC: 9 MG/DL (ref 8.5–10.1)
CALLED TO: NORMAL
CHLORIDE SERPL-SCNC: 104 MMOL/L (ref 100–111)
CO2 SERPL-SCNC: 18 MMOL/L (ref 21–32)
CREAT SERPL-MCNC: 3.44 MG/DL (ref 0.6–1.3)
CROSSMATCH RESULT: NORMAL
DIFFERENTIAL METHOD BLD: ABNORMAL
EOSINOPHIL # BLD: 0 K/UL (ref 0–0.4)
EOSINOPHIL NFR BLD: 0 % (ref 0–5)
ERYTHROCYTE [DISTWIDTH] IN BLOOD BY AUTOMATED COUNT: 17.1 % (ref 11.6–14.5)
GLUCOSE BLD STRIP.AUTO-MCNC: 171 MG/DL (ref 70–110)
GLUCOSE BLD STRIP.AUTO-MCNC: 171 MG/DL (ref 70–110)
GLUCOSE BLD STRIP.AUTO-MCNC: 193 MG/DL (ref 70–110)
GLUCOSE BLD STRIP.AUTO-MCNC: 213 MG/DL (ref 70–110)
GLUCOSE SERPL-MCNC: 183 MG/DL (ref 74–99)
HCT VFR BLD AUTO: 28.4 % (ref 35–45)
HGB BLD-MCNC: 9 G/DL (ref 12–16)
IMM GRANULOCYTES # BLD AUTO: 0.3 K/UL (ref 0–0.04)
IMM GRANULOCYTES NFR BLD AUTO: 2 % (ref 0–0.5)
LYMPHOCYTES # BLD: 0.6 K/UL (ref 0.9–3.6)
LYMPHOCYTES NFR BLD: 5 % (ref 21–52)
MAGNESIUM SERPL-MCNC: 1.8 MG/DL (ref 1.6–2.6)
MCH RBC QN AUTO: 29.6 PG (ref 24–34)
MCHC RBC AUTO-ENTMCNC: 31.7 G/DL (ref 31–37)
MCV RBC AUTO: 93.4 FL (ref 78–100)
MONOCYTES # BLD: 1 K/UL (ref 0.05–1.2)
MONOCYTES NFR BLD: 8 % (ref 3–10)
NEUTS SEG # BLD: 10.8 K/UL (ref 1.8–8)
NEUTS SEG NFR BLD: 85 % (ref 40–73)
NRBC # BLD: 0.31 K/UL (ref 0–0.01)
NRBC BLD-RTO: 2.4 PER 100 WBC
PLATELET # BLD AUTO: 345 K/UL (ref 135–420)
PMV BLD AUTO: 9.8 FL (ref 9.2–11.8)
POTASSIUM SERPL-SCNC: 5.6 MMOL/L (ref 3.5–5.5)
RBC # BLD AUTO: 3.04 M/UL (ref 4.2–5.3)
SODIUM SERPL-SCNC: 133 MMOL/L (ref 136–145)
SPECIMEN EXP DATE BLD: NORMAL
UNIT DIVISION: 0
WBC # BLD AUTO: 12.8 K/UL (ref 4.6–13.2)

## 2024-03-03 PROCEDURE — 80048 BASIC METABOLIC PNL TOTAL CA: CPT

## 2024-03-03 PROCEDURE — 6370000000 HC RX 637 (ALT 250 FOR IP): Performed by: STUDENT IN AN ORGANIZED HEALTH CARE EDUCATION/TRAINING PROGRAM

## 2024-03-03 PROCEDURE — 6360000002 HC RX W HCPCS: Performed by: STUDENT IN AN ORGANIZED HEALTH CARE EDUCATION/TRAINING PROGRAM

## 2024-03-03 PROCEDURE — 2700000000 HC OXYGEN THERAPY PER DAY

## 2024-03-03 PROCEDURE — 1100000003 HC PRIVATE W/ TELEMETRY

## 2024-03-03 PROCEDURE — 97535 SELF CARE MNGMENT TRAINING: CPT

## 2024-03-03 PROCEDURE — 97165 OT EVAL LOW COMPLEX 30 MIN: CPT

## 2024-03-03 PROCEDURE — 99232 SBSQ HOSP IP/OBS MODERATE 35: CPT | Performed by: NURSE PRACTITIONER

## 2024-03-03 PROCEDURE — 6370000000 HC RX 637 (ALT 250 FOR IP): Performed by: HOSPITALIST

## 2024-03-03 PROCEDURE — 6370000000 HC RX 637 (ALT 250 FOR IP): Performed by: INTERNAL MEDICINE

## 2024-03-03 PROCEDURE — 36415 COLL VENOUS BLD VENIPUNCTURE: CPT

## 2024-03-03 PROCEDURE — 85730 THROMBOPLASTIN TIME PARTIAL: CPT

## 2024-03-03 PROCEDURE — 97162 PT EVAL MOD COMPLEX 30 MIN: CPT

## 2024-03-03 PROCEDURE — 82962 GLUCOSE BLOOD TEST: CPT

## 2024-03-03 PROCEDURE — 97530 THERAPEUTIC ACTIVITIES: CPT

## 2024-03-03 PROCEDURE — 2580000003 HC RX 258: Performed by: STUDENT IN AN ORGANIZED HEALTH CARE EDUCATION/TRAINING PROGRAM

## 2024-03-03 PROCEDURE — 83735 ASSAY OF MAGNESIUM: CPT

## 2024-03-03 PROCEDURE — 85025 COMPLETE CBC W/AUTO DIFF WBC: CPT

## 2024-03-03 PROCEDURE — 94761 N-INVAS EAR/PLS OXIMETRY MLT: CPT

## 2024-03-03 RX ORDER — IPRATROPIUM BROMIDE AND ALBUTEROL SULFATE 2.5; .5 MG/3ML; MG/3ML
1 SOLUTION RESPIRATORY (INHALATION) EVERY 6 HOURS PRN
Status: DISCONTINUED | OUTPATIENT
Start: 2024-03-03 | End: 2024-03-09 | Stop reason: HOSPADM

## 2024-03-03 RX ORDER — OXYCODONE HYDROCHLORIDE AND ACETAMINOPHEN 5; 325 MG/1; MG/1
1 TABLET ORAL ONCE
Status: COMPLETED | OUTPATIENT
Start: 2024-03-03 | End: 2024-03-03

## 2024-03-03 RX ORDER — HYDROCODONE BITARTRATE AND ACETAMINOPHEN 5; 325 MG/1; MG/1
1 TABLET ORAL EVERY 6 HOURS PRN
Status: COMPLETED | OUTPATIENT
Start: 2024-03-03 | End: 2024-03-05

## 2024-03-03 RX ADMIN — FAMOTIDINE 20 MG: 20 TABLET ORAL at 09:34

## 2024-03-03 RX ADMIN — SODIUM CHLORIDE, PRESERVATIVE FREE 10 ML: 5 INJECTION INTRAVENOUS at 21:26

## 2024-03-03 RX ADMIN — HYDROCODONE BITARTRATE AND ACETAMINOPHEN 1 TABLET: 5; 325 TABLET ORAL at 16:22

## 2024-03-03 RX ADMIN — GABAPENTIN 400 MG: 400 CAPSULE ORAL at 17:46

## 2024-03-03 RX ADMIN — INSULIN LISPRO 1 UNITS: 100 INJECTION, SOLUTION INTRAVENOUS; SUBCUTANEOUS at 17:47

## 2024-03-03 RX ADMIN — FOLIC ACID 1 MG: 1 TABLET ORAL at 09:31

## 2024-03-03 RX ADMIN — ONDANSETRON 4 MG: 2 INJECTION INTRAMUSCULAR; INTRAVENOUS at 20:01

## 2024-03-03 RX ADMIN — SODIUM ZIRCONIUM CYCLOSILICATE 10 G: 5 POWDER, FOR SUSPENSION ORAL at 21:12

## 2024-03-03 RX ADMIN — SODIUM ZIRCONIUM CYCLOSILICATE 10 G: 5 POWDER, FOR SUSPENSION ORAL at 09:38

## 2024-03-03 RX ADMIN — BUMETANIDE 2 MG: 1 TABLET ORAL at 21:19

## 2024-03-03 RX ADMIN — ATORVASTATIN CALCIUM 80 MG: 40 TABLET, FILM COATED ORAL at 21:19

## 2024-03-03 RX ADMIN — ALLOPURINOL 100 MG: 100 TABLET ORAL at 09:33

## 2024-03-03 RX ADMIN — TRAMADOL HYDROCHLORIDE 25 MG: 50 TABLET ORAL at 04:02

## 2024-03-03 RX ADMIN — HEPARIN SODIUM 13 UNITS/KG/HR: 10000 INJECTION, SOLUTION INTRAVENOUS at 08:44

## 2024-03-03 RX ADMIN — INSULIN GLARGINE 4 UNITS: 100 INJECTION, SOLUTION SUBCUTANEOUS at 21:27

## 2024-03-03 RX ADMIN — CYANOCOBALAMIN TAB 1000 MCG 1000 MCG: 1000 TAB at 09:32

## 2024-03-03 RX ADMIN — HYDROCODONE BITARTRATE AND ACETAMINOPHEN 1 TABLET: 5; 325 TABLET ORAL at 21:19

## 2024-03-03 RX ADMIN — SODIUM CHLORIDE, PRESERVATIVE FREE 10 ML: 5 INJECTION INTRAVENOUS at 09:36

## 2024-03-03 RX ADMIN — HEPARIN SODIUM 4000 UNITS: 1000 INJECTION INTRAVENOUS; SUBCUTANEOUS at 05:14

## 2024-03-03 RX ADMIN — BUMETANIDE 2 MG: 1 TABLET ORAL at 09:31

## 2024-03-03 RX ADMIN — OXYCODONE HYDROCHLORIDE AND ACETAMINOPHEN 1 TABLET: 5; 325 TABLET ORAL at 05:04

## 2024-03-03 RX ADMIN — ASPIRIN 81 MG CHEWABLE TABLET 81 MG: 81 TABLET CHEWABLE at 09:34

## 2024-03-03 RX ADMIN — METOPROLOL SUCCINATE 50 MG: 50 TABLET, EXTENDED RELEASE ORAL at 09:33

## 2024-03-03 ASSESSMENT — PAIN DESCRIPTION - LOCATION
LOCATION: NECK
LOCATION: ARM;BACK;LEG
LOCATION: NECK
LOCATION_2: GENERALIZED
LOCATION: GENERALIZED

## 2024-03-03 ASSESSMENT — PAIN DESCRIPTION - DESCRIPTORS
DESCRIPTORS: ACHING;STABBING
DESCRIPTORS: STABBING
DESCRIPTORS: ACHING;STABBING
DESCRIPTORS: SHARP
DESCRIPTORS_2: SHARP
DESCRIPTORS: STABBING

## 2024-03-03 ASSESSMENT — PAIN DESCRIPTION - PAIN TYPE
TYPE: CHRONIC PAIN
TYPE: ACUTE PAIN

## 2024-03-03 ASSESSMENT — PAIN SCALES - GENERAL
PAINLEVEL_OUTOF10: 10
PAINLEVEL_OUTOF10: 8
PAINLEVEL_OUTOF10: 10
PAINLEVEL_OUTOF10: 0
PAINLEVEL_OUTOF10: 10
PAINLEVEL_OUTOF10: 10

## 2024-03-03 ASSESSMENT — PAIN - FUNCTIONAL ASSESSMENT
PAIN_FUNCTIONAL_ASSESSMENT_SITE2: PREVENTS OR INTERFERES SOME ACTIVE ACTIVITIES AND ADLS
PAIN_FUNCTIONAL_ASSESSMENT: ACTIVITIES ARE NOT PREVENTED

## 2024-03-03 ASSESSMENT — PAIN DESCRIPTION - ONSET: ONSET: ON-GOING

## 2024-03-03 ASSESSMENT — PAIN DESCRIPTION - FREQUENCY
FREQUENCY: INTERMITTENT
FREQUENCY: CONTINUOUS

## 2024-03-03 ASSESSMENT — PAIN DESCRIPTION - ORIENTATION
ORIENTATION: RIGHT
ORIENTATION: RIGHT
ORIENTATION_2: LOWER;OTHER (COMMENT)
ORIENTATION: RIGHT
ORIENTATION: OTHER (COMMENT)

## 2024-03-03 ASSESSMENT — PAIN DESCRIPTION - INTENSITY: RATING_2: 10

## 2024-03-03 NOTE — PROGRESS NOTES
PHYSICAL THERAPY EVALUATION/DISCHARGE    Patient: Christy Donahue (73 y.o. female)  Date: 3/3/2024  Primary Diagnosis: Normocytic anemia [D64.9]  Essential hypertension [I10]  NSTEMI (non-ST elevated myocardial infarction) (H[facility]) [I21.4]  Elevated brain natriuretic peptide (BNP) level [R79.89]  On anticoagulant therapy [Z79.01]  Acute decompensated heart failure (HCC) [I50.9]  Acute kidney injury superimposed on CKD (HCC) [N17.9, N18.9]  Deep vein thrombosis (DVT) of proximal lower extremity, unspecified chronicity, unspecified laterality (HCC) [I82.4Y9]  Acute on chronic congestive heart failure, unspecified heart failure type (HCC) [I50.9]       Precautions: Contact Precautions, Fall Risk,  ,  ,  ,  ,  ,  ,    PLOF: Mod I with mobility using RW.     ASSESSMENT AND RECOMMENDATIONS:  Patient resting in bed upon entry and agreeable to PT evaluation. Supervision supine to sit transfer. She reports chronic arthritis pain in B knees. Patient performs standing transfer with supervision. Ambulates 5 ft to recliner chair using RW with slow, fairly steady gait. Supervision to complete SPT without AD to BSC. Patient returns to chair and left with all needs in reach.     Patient does not require further skilled physical therapy intervention at this level of care.    Further Equipment Recommendations for Discharge: RW    AMPA: AM-PAC Inpatient Mobility Raw Score : 19       Current research shows that an AM-PAC score of 18 (14 without stairs) or greater is associated with a discharge to the patient's home setting. Based on an AM-PAC score and their current functional mobility deficits, it is recommended that the patient have 2-3 sessions per week of Physical Therapy at d/c to increase the patient's independence.      This AMPAC score should be considered in conjunction with interdisciplinary team recommendations to determine the most appropriate discharge setting. Patient's social support, diagnosis, medical stability, and prior

## 2024-03-03 NOTE — PROGRESS NOTES
OCCUPATIONAL THERAPY EVALUATION/DISCHARGE    Patient: Christy Donahue (73 y.o. female)  Date: 3/3/2024  Primary Diagnosis: Normocytic anemia [D64.9]  Essential hypertension [I10]  NSTEMI (non-ST elevated myocardial infarction) (HCC) [I21.4]  Elevated brain natriuretic peptide (BNP) level [R79.89]  On anticoagulant therapy [Z79.01]  Acute decompensated heart failure (HCC) [I50.9]  Acute kidney injury superimposed on CKD (HCC) [N17.9, N18.9]  Deep vein thrombosis (DVT) of proximal lower extremity, unspecified chronicity, unspecified laterality (HCC) [I82.4Y9]  Acute on chronic congestive heart failure, unspecified heart failure type (HCC) [I50.9]       Precautions: Contact Precautions, Fall Risk  PLOF: Pt lives with , Mod Ind for ADLs and functional mobility using FWW.     ASSESSMENT AND RECOMMENDATIONS:    Based on the objective data below, patient presents with ability to perform basic ADLs and functional transfers at/very near baseline level of function. Pt required additional time to complete all tasks due to pain (generalized), and fatigue. Mod Ind for donning socks at EOB, Supervision for doffing soiled underwear and toileting hygiene. Pt completed bed mobility and functional txfrs to BSC and recliner with FWW and w/o AD with Supervision mainly for management of IV pole. Pt left comfortable in recliner, all needs met. Pt lives with supportive family available to assist as needed.     Maximum therapeutic gains met at current level of care and patient will be discharged from occupational therapy at this time. Pt may benefit from HHOT to address higher level ADLs and IADLs in home environment.    Further Equipment Recommendations for Discharge: NA pt has DME    [facility]: AM-PAC Inpatient Daily Activity Raw Score: 22       Current research shows that an AM-PAC score of 18 or greater is associated with a discharge to the patient's home setting.  Based on an AM-PAC score and their current ADL deficits; it is      Balance:     Balance  Sitting: Intact  Standing: Impaired  Standing - Static: Good  Standing - Dynamic: Fair (Fair to fair plus)    Strength: BUE  Strength: Generally decreased, functional    Tone & Sensation: BUE  Tone: Normal  Sensation: Intact    Range of Motion: BUE  AROM: Generally decreased, functional       Functional Mobility and Transfers for ADLs:  Bed Mobility:     Bed Mobility Training  Bed Mobility Training: Yes  Rolling: Supervision  Supine to Sit: Supervision  Transfers:   Transfer Training  Transfer Training: Yes  Sit to Stand: Supervision  Stand to Sit: Supervision  Toilet Transfer: Supervision    ADL Assessment:   Feeding: Modified independent   Grooming: Modified independent   UE Bathing: Supervision  LE Bathing: Supervision  UE Dressing: Modified independent   LE Dressing: Supervision  Toileting: Supervision  Functional Mobility: Stand by assistance     ADL Intervention:  BSC txfr with FWW and w/o AD with Supervision  Donning socks with Mod Ind while sitting at EOB  [place] underwear with Supervision      Pain:  Pain level pre-treatment: 8/10   Pain level post-treatment: 8/10     Activity Tolerance:   Activity Tolerance: Patient Tolerated treatment well  Please refer to the flowsheet for vital signs taken during this treatment.    After treatment:   [x] Patient left in no apparent distress sitting up in chair  [] Patient left in no apparent distress in bed  [x] Call bell left within reach  [x] Nursing notified  [] Caregiver present  [] Bed alarm activated    COMMUNICATION/EDUCATION:   Patient Education  Education Given To: Patient  Education Provided: Role of Therapy;ADL Adaptive Strategies;Transfer Training;Energy Conservation;Fall Prevention Strategies  Education Method: Demonstration;Verbal;Teach Back  Barriers to Learning: None    Thank you for this referral.  Varinder Alcala OTR/L  Minutes: 34    Eval Complexity: Decision Making: Low Complexity

## 2024-03-03 NOTE — PROGRESS NOTES
1500 - Warm compress applied to patient's RT forearm per Dr. Noel's order. Patient communicates that her arm feels better.

## 2024-03-03 NOTE — PLAN OF CARE
Problem: Pain  Goal: Verbalizes/displays adequate comfort level or baseline comfort level  Outcome: Progressing  Flowsheets (Taken 3/3/2024 3337)  Verbalizes/displays adequate comfort level or baseline comfort level:   Encourage patient to monitor pain and request assistance   Assess pain using appropriate pain scale   Administer analgesics based on type and severity of pain and evaluate response     Problem: Safety - Adult  Goal: Free from fall injury  Outcome: Progressing     Problem: Skin/Tissue Integrity  Goal: Absence of new skin breakdown  Description: 1.  Monitor for areas of redness and/or skin breakdown  2.  Assess vascular access sites hourly  3.  Every 4-6 hours minimum:  Change oxygen saturation probe site  4.  Every 4-6 hours:  If on nasal continuous positive airway pressure, respiratory therapy assess nares and determine need for appliance change or resting period.  Outcome: Progressing

## 2024-03-03 NOTE — CARE COORDINATION
3/3/24     03/03/24 1122   Readmission Assessment   Number of Days since last admission? 8-30 days   Previous Disposition Home with Family   Who is being Interviewed Patient   What was the patient's/caregiver's perception as to why they think they needed to return back to the hospital? Other (Comment)  (dyspnea and chest pain)   Did you visit your Primary Care Physician after you left the hospital, before you returned this time? Yes   Did you see a specialist, such as Cardiac, Pulmonary, Orthopedic Physician, etc. after you left the hospital? Yes   Who advised the patient to return to the hospital? Self-referral   Does the patient report anything that got in the way of taking their medications? No   In our efforts to provide the best possible care to you and others like you, can you think of anything that we could have done to help you after you left the hospital the first time, so that you might not have needed to return so soon? Other (Comment)  (no barriers.)     Vijaya Quick  Case Management

## 2024-03-03 NOTE — PROGRESS NOTES
1105 Patient not available for neb treatment, with PT/OT.     1127 patient refused neb treatment. No respiratory distress or issues noted.

## 2024-03-03 NOTE — PROGRESS NOTES
RENAL DAILY PROGRESS NOTE    Patient: Christy Donahue               Sex: female          DOA: 3/1/2024  8:06 PM        YOB: 1950      Age:  73 y.o.        LOS:  LOS: 2 days     Subjective:     Christy Donahue is a 73 y.o.  who presents with Normocytic anemia [D64.9]  Essential hypertension [I10]  NSTEMI (non-ST elevated myocardial infarction) (HCC) [I21.4]  Elevated brain natriuretic peptide (BNP) level [R79.89]  On anticoagulant therapy [Z79.01]  Acute decompensated heart failure (HCC) [I50.9]  Acute kidney injury superimposed on CKD (HCC) [N17.9, N18.9]  Deep vein thrombosis (DVT) of proximal lower extremity, unspecified chronicity, unspecified laterality (HCC) [I82.4Y9]  Acute on chronic congestive heart failure, unspecified heart failure type (HCC) [I50.9].   73 y o  female with hx of crf stage 4,hx of chf,hx of copd on home oxygen .used to be on dialysis.has access left arm  Chief complains: Patient denies nausea, vomiting,   - Reviewed last 24 hrs events     Current Facility-Administered Medications   Medication Dose Route Frequency    [START ON 3/4/2024] epoetin agnes-epbx (RETACRIT) 5,000 Units combo injection  5,000 Units SubCUTAneous Once per day on Mon Wed Fri    sodium zirconium cyclosilicate (LOKELMA) oral suspension 10 g  10 g Oral TID    ipratropium 0.5 mg-albuterol 2.5 mg (DUONEB) nebulizer solution 1 Dose  1 Dose Inhalation Q6H PRN    HYDROcodone-acetaminophen (NORCO) 5-325 MG per tablet 1 tablet  1 tablet Oral Q6H PRN    arformoterol tartrate (BROVANA) nebulizer solution 15 mcg  15 mcg Nebulization BID RT    glucose chewable tablet 16 g  4 tablet Oral PRN    dextrose bolus 10% 125 mL  125 mL IntraVENous PRN    Or    dextrose bolus 10% 250 mL  250 mL IntraVENous PRN    glucagon (rDNA) injection 1 mg  1 mg SubCUTAneous PRN    dextrose 10 % infusion   IntraVENous Continuous PRN    insulin lispro (HUMALOG) injection vial 0-4 Units  0-4 Units SubCUTAneous TID  anemia,start epo    D/w Dr. NERISSA OLSEN MD  Nephrology  3/3/2024

## 2024-03-03 NOTE — CARE COORDINATION
3/3/24     Patient is requesting a wheelchair for outing needs. CM follow up post PT/OT eval. Spouse to provide discharge ride. Patient uses oxygen 3L/NC at home. Patient wants a portable concentrator for outings/MD appointments. She is to follow up with her oxygen supply company (she believes it is [employer]).     Patient followed by [facility]-nursing services.    Vijaya Quick  Case Management

## 2024-03-03 NOTE — CARE COORDINATION
3/3/24    Case Management Assessment  Initial Evaluation    Date/Time of Evaluation: 3/3/2024 11:11 AM  Assessment Completed by: Vijaya Quick    If patient is discharged prior to next notation, then this note serves as note for discharge by case management.    Patient Name: Christy Donhaue                   YOB: 1950  Diagnosis: Normocytic anemia [D64.9]  Essential hypertension [I10]  NSTEMI (non-ST elevated myocardial infarction) (HCC) [I21.4]  Elevated brain natriuretic peptide (BNP) level [R79.89]  On anticoagulant therapy [Z79.01]  Acute decompensated heart failure (HCC) [I50.9]  Acute kidney injury superimposed on CKD (HCC) [N17.9, N18.9]  Deep vein thrombosis (DVT) of proximal lower extremity, unspecified chronicity, unspecified laterality (HCC) [I82.4Y9]  Acute on chronic congestive heart failure, unspecified heart failure type (HCC) [I50.9]                   Date / Time: 3/1/2024  8:06 PM    Patient Admission Status: Inpatient   Readmission Risk (Low < 19, Mod (19-27), High > 27): Readmission Risk Score: 37.5    Current PCP: Kalli Louise APRN - NP  PCP verified by CM? (P) Yes    Chart Reviewed: Yes      History Provided by: (P) Patient  Patient Orientation: (P) Alert and Oriented    Patient Cognition: (P) Alert    Hospitalization in the last 30 days (Readmission):  Yes    If yes, Readmission Assessment in CM Navigator will be completed.    Advance Directives:      Code Status: Full Code   Patient's Primary Decision Maker is: (P) Named in Scanned ACP Document    Primary Decision Maker: Lance Donahue - Spouse - [phone]    Discharge Planning:    Patient lives with: (P) Spouse/Significant Other Type of Home: (P) House  Primary Care Giver: (P) Self  Patient Support Systems include: (P) Spouse/Significant Other   Current Financial resources: (P) Medicare, Medicaid  Current community resources: (P) None  Current services prior to admission: (P) Oxygen Therapy, Durable Medical  preferences, and shares the quality data associated with the providers?  Yes     Vijaya Quick  Case Management Department

## 2024-03-03 NOTE — PROGRESS NOTES
Got report at 1930 abo    Bedside shift change report recieved from  Deborah KNOX Report included the following information Nurse Handoff Report, MAR, and Recent Results. Critical result ptt greater than 180 received from charge nurse. Heparin gtt rate verified different rate than reported, STAT ptt ordered for accuracy. Drip paused per protocol.

## 2024-03-03 NOTE — PROGRESS NOTES
[facility]  Progress Note    Patient: Christy Donahue Age: 73 y.o. : 1950 MR#: [id number] SSN: [id number]  Date: 3/3/2024     Subjective:   Chief Complaint on Admission: shortness of breath    Reports shortness of breath improved, denies CP, c/o continued irritation at site of right neck IV, complains of pain to your hematoma to right antecubital reports generalized pain complaints and cramping.    Assessment/Plan:   Acute decompensated heart failure -diuresis as per nephrology, strict I's and O's.  Net output -2.2 L Last echo with EF of 40 to 45%  CKD stage IV previously on hemodialysis - Dr. Márquez following and appreciated patient deferred consideration for resumption of dialysis at this time (reiterates today)  Acute on chronic hypoxic respiratory failure - improved and currently on home fluid with 3 L nasal cannula.  Pt requesting smaller portable O2 device on d/c  Hyperkalemia - In setting of #2, lokelma per nephrology, pt takes at home as well  Acute on chronic anemia -no evidence of acute bleeding concerns, in setting of kidney disease, no evidence of active bleeding.  Acute nonocclusive DVT in the left popliteal and posterior tibial veins, chronic nonocclusive DVT in the left peroneal vein - hold Eliquis, continue heparin drip  Type 2 diabetes with hyperglycemia -continue low-dose Lantus and sliding scale, last A1c 5.0 as of 2024  Chronic pain - cont neurontin (monitor closely r/t #2), tramadol PRN.  Patient requesting stronger pain medication for multiple pain concerns.  Encourage nursing for warm compress to right antecubital, encourage patient to get out of bed and work with therapy  ESBL - + intraoperative abdominal fluid cultures positive for ESBL in 2023    Discussed plan of care with patient and she has no questions at this time    Additional Notes:      Case discussed with:  [x]Patient  []Family  [x]Nursing  []Case Management  DVT  Prophylaxis:  []Lovenox  []Hep SQ  []SCDs  []Coumadin / Eliquis or Xarelto   [x]On Heparin gtt    Objective:     VS: /61   Pulse 85   Temp 98.2 °F (36.8 °C) (Oral)   Resp 18   Ht 1.626 m (5' 4\")   Wt 85.3 kg (188 lb)   SpO2 100%   BMI 32.27 kg/m²    Tmax/24hrs: Temp (24hrs), Av.1 °F (36.7 °C), Min:97.8 °F (36.6 °C), Max:98.4 °F (36.9 °C)    Intake/Output Summary (Last 24 hours) at 3/3/2024 1406  Last data filed at 3/3/2024 0412  Gross per 24 hour   Intake --   Output 2200 ml   Net -2200 ml     General:  Alert, NAD  HEENT: Oral mucosa moist  Cardiovascular:  RRR, Nl S1/S2  Pulmonary:  dim to bases otherwise good air mov't to upper. Normal resp effort  GI:  +BS in all four quadrants, soft, non-tender  Extremities:  No edema; 2+ dorsalis pedis pulses bilaterally  Neuro: alert and oriented x 4    Labs / micro / imaging :    Recent Results (from the past 24 hour(s))   POCT Glucose    Collection Time: 24  4:27 PM   Result Value Ref Range    POC Glucose 209 (H) 70 - 110 mg/dL   APTT    Collection Time: 24  6:30 PM   Result Value Ref Range    APTT >180.0 (HH) 23.0 - 36.4 SEC   APTT    Collection Time: 24  8:27 PM   Result Value Ref Range    APTT 158.3 (HH) 23.0 - 36.4 SEC   POCT Glucose    Collection Time: 24  8:49 PM   Result Value Ref Range    POC Glucose 148 (H) 70 - 110 mg/dL   APTT    Collection Time: 24  4:18 AM   Result Value Ref Range    APTT 55.4 (H) 23.0 - 36.4 SEC   Basic Metabolic Panel    Collection Time: 24  4:18 AM   Result Value Ref Range    Sodium 133 (L) 136 - 145 mmol/L    Potassium 5.6 (H) 3.5 - 5.5 mmol/L    Chloride 104 100 - 111 mmol/L    CO2 18 (L) 21 - 32 mmol/L    Anion Gap 11 3.0 - 18 mmol/L    Glucose 183 (H) 74 - 99 mg/dL     (H) 7.0 - 18 MG/DL    Creatinine 3.44 (H) 0.6 - 1.3 MG/DL    Bun/Cre Ratio 29 (H) 12 - 20      Est, Glom Filt Rate 14 (L) >60 ml/min/1.73m2    Calcium 9.0 8.5 - 10.1 MG/DL   Magnesium    Collection Time: 24

## 2024-03-04 ENCOUNTER — APPOINTMENT (OUTPATIENT)
Facility: HOSPITAL | Age: 74
DRG: 280 | End: 2024-03-04
Payer: MEDICARE

## 2024-03-04 PROBLEM — Z71.89 GOALS OF CARE, COUNSELING/DISCUSSION: Status: ACTIVE | Noted: 2024-03-04

## 2024-03-04 PROBLEM — N18.9 ACUTE KIDNEY INJURY SUPERIMPOSED ON CKD (HCC): Status: ACTIVE | Noted: 2023-08-06

## 2024-03-04 PROBLEM — R06.02 SHORTNESS OF BREATH: Status: ACTIVE | Noted: 2024-03-04

## 2024-03-04 PROBLEM — N17.9 ACUTE KIDNEY INJURY SUPERIMPOSED ON CKD (HCC): Status: ACTIVE | Noted: 2023-08-06

## 2024-03-04 LAB
ALBUMIN SERPL-MCNC: 3.4 G/DL (ref 3.4–5)
ANION GAP SERPL CALC-SCNC: 9 MMOL/L (ref 3–18)
APTT PPP: 22.9 SEC (ref 23–36.4)
BUN SERPL-MCNC: 116 MG/DL (ref 7–18)
BUN/CREAT SERPL: 33 (ref 12–20)
CALCIUM SERPL-MCNC: 8.4 MG/DL (ref 8.5–10.1)
CHLORIDE SERPL-SCNC: 101 MMOL/L (ref 100–111)
CO2 SERPL-SCNC: 23 MMOL/L (ref 21–32)
CREAT SERPL-MCNC: 3.49 MG/DL (ref 0.6–1.3)
GLUCOSE BLD STRIP.AUTO-MCNC: 164 MG/DL (ref 70–110)
GLUCOSE BLD STRIP.AUTO-MCNC: 180 MG/DL (ref 70–110)
GLUCOSE BLD STRIP.AUTO-MCNC: 198 MG/DL (ref 70–110)
GLUCOSE SERPL-MCNC: 161 MG/DL (ref 74–99)
PHOSPHATE SERPL-MCNC: 7.9 MG/DL (ref 2.5–4.9)
POTASSIUM SERPL-SCNC: 5.7 MMOL/L (ref 3.5–5.5)
SODIUM SERPL-SCNC: 133 MMOL/L (ref 136–145)

## 2024-03-04 PROCEDURE — 6360000002 HC RX W HCPCS: Performed by: HOSPITALIST

## 2024-03-04 PROCEDURE — 99232 SBSQ HOSP IP/OBS MODERATE 35: CPT | Performed by: HOSPITALIST

## 2024-03-04 PROCEDURE — 94761 N-INVAS EAR/PLS OXIMETRY MLT: CPT

## 2024-03-04 PROCEDURE — 73501 X-RAY EXAM HIP UNI 1 VIEW: CPT

## 2024-03-04 PROCEDURE — 99222 1ST HOSP IP/OBS MODERATE 55: CPT | Performed by: NURSE PRACTITIONER

## 2024-03-04 PROCEDURE — 80069 RENAL FUNCTION PANEL: CPT

## 2024-03-04 PROCEDURE — 6370000000 HC RX 637 (ALT 250 FOR IP): Performed by: STUDENT IN AN ORGANIZED HEALTH CARE EDUCATION/TRAINING PROGRAM

## 2024-03-04 PROCEDURE — 82962 GLUCOSE BLOOD TEST: CPT

## 2024-03-04 PROCEDURE — 6370000000 HC RX 637 (ALT 250 FOR IP): Performed by: HOSPITALIST

## 2024-03-04 PROCEDURE — 2580000003 HC RX 258: Performed by: STUDENT IN AN ORGANIZED HEALTH CARE EDUCATION/TRAINING PROGRAM

## 2024-03-04 PROCEDURE — 70450 CT HEAD/BRAIN W/O DYE: CPT

## 2024-03-04 PROCEDURE — 72100 X-RAY EXAM L-S SPINE 2/3 VWS: CPT

## 2024-03-04 PROCEDURE — 1100000003 HC PRIVATE W/ TELEMETRY

## 2024-03-04 PROCEDURE — 36415 COLL VENOUS BLD VENIPUNCTURE: CPT

## 2024-03-04 PROCEDURE — 6360000002 HC RX W HCPCS: Performed by: INTERNAL MEDICINE

## 2024-03-04 PROCEDURE — 85730 THROMBOPLASTIN TIME PARTIAL: CPT

## 2024-03-04 PROCEDURE — 6360000002 HC RX W HCPCS: Performed by: STUDENT IN AN ORGANIZED HEALTH CARE EDUCATION/TRAINING PROGRAM

## 2024-03-04 PROCEDURE — 94640 AIRWAY INHALATION TREATMENT: CPT

## 2024-03-04 PROCEDURE — 73502 X-RAY EXAM HIP UNI 2-3 VIEWS: CPT

## 2024-03-04 PROCEDURE — 2700000000 HC OXYGEN THERAPY PER DAY

## 2024-03-04 RX ADMIN — HYDROCODONE BITARTRATE AND ACETAMINOPHEN 1 TABLET: 5; 325 TABLET ORAL at 10:44

## 2024-03-04 RX ADMIN — ATORVASTATIN CALCIUM 80 MG: 40 TABLET, FILM COATED ORAL at 20:08

## 2024-03-04 RX ADMIN — ALLOPURINOL 100 MG: 100 TABLET ORAL at 10:43

## 2024-03-04 RX ADMIN — FOLIC ACID 1 MG: 1 TABLET ORAL at 10:44

## 2024-03-04 RX ADMIN — ACETAMINOPHEN 325MG 650 MG: 325 TABLET ORAL at 13:09

## 2024-03-04 RX ADMIN — METOPROLOL SUCCINATE 50 MG: 50 TABLET, EXTENDED RELEASE ORAL at 10:43

## 2024-03-04 RX ADMIN — ARFORMOTEROL TARTRATE 15 MCG: 15 SOLUTION RESPIRATORY (INHALATION) at 22:11

## 2024-03-04 RX ADMIN — HYDROCODONE BITARTRATE AND ACETAMINOPHEN 1 TABLET: 5; 325 TABLET ORAL at 17:18

## 2024-03-04 RX ADMIN — BUMETANIDE 2 MG: 1 TABLET ORAL at 10:43

## 2024-03-04 RX ADMIN — SODIUM CHLORIDE, PRESERVATIVE FREE 10 ML: 5 INJECTION INTRAVENOUS at 20:13

## 2024-03-04 RX ADMIN — BUMETANIDE 2 MG: 1 TABLET ORAL at 20:09

## 2024-03-04 RX ADMIN — FAMOTIDINE 20 MG: 20 TABLET ORAL at 10:44

## 2024-03-04 RX ADMIN — ASPIRIN 81 MG CHEWABLE TABLET 81 MG: 81 TABLET CHEWABLE at 10:43

## 2024-03-04 RX ADMIN — CYANOCOBALAMIN TAB 1000 MCG 1000 MCG: 1000 TAB at 10:43

## 2024-03-04 RX ADMIN — INSULIN GLARGINE 4 UNITS: 100 INJECTION, SOLUTION SUBCUTANEOUS at 20:12

## 2024-03-04 RX ADMIN — SODIUM ZIRCONIUM CYCLOSILICATE 5 G: 5 POWDER, FOR SUSPENSION ORAL at 17:18

## 2024-03-04 RX ADMIN — ARFORMOTEROL TARTRATE 15 MCG: 15 SOLUTION RESPIRATORY (INHALATION) at 09:52

## 2024-03-04 RX ADMIN — SODIUM ZIRCONIUM CYCLOSILICATE 5 G: 5 POWDER, FOR SUSPENSION ORAL at 20:13

## 2024-03-04 RX ADMIN — TRAMADOL HYDROCHLORIDE 25 MG: 50 TABLET ORAL at 14:32

## 2024-03-04 RX ADMIN — HEPARIN SODIUM 11 UNITS/KG/HR: 10000 INJECTION, SOLUTION INTRAVENOUS at 17:18

## 2024-03-04 RX ADMIN — EPOETIN ALFA-EPBX 5000 UNITS: 3000 INJECTION, SOLUTION INTRAVENOUS; SUBCUTANEOUS at 20:10

## 2024-03-04 RX ADMIN — SODIUM CHLORIDE, PRESERVATIVE FREE 10 ML: 5 INJECTION INTRAVENOUS at 10:43

## 2024-03-04 RX ADMIN — GABAPENTIN 400 MG: 400 CAPSULE ORAL at 17:18

## 2024-03-04 ASSESSMENT — PAIN SCALES - GENERAL
PAINLEVEL_OUTOF10: 0
PAINLEVEL_OUTOF10: 10

## 2024-03-04 NOTE — PROGRESS NOTES
[facility]  Progress Note    Patient: Christy Donahue Age: 73 y.o. : 1950 MR#: [id number] SSN: [id number]  Date: 3/4/2024     Subjective:     Early am, patient sustained a ground level fall while walking to the bathroom. Hit the back of her head on the door as she was falling to the ground. Patient endorsed new headache.  Head CT nil acute.    Patient seen and examined at bedside, she reports she still has a headache.  She also reports back pain and leg pain since her fall.  She states she got out of bed without calling for help because she is \"hard headed,\" and she will call for assistance in the future.  She continues to decline dialysis, states she is fine without it.  No family at bedside.      Assessment/Plan:   Acute decompensated heart failure -diuresis as per nephrology, strict I's and O's.  Net output -2.2 L Last echo with EF of 40 to 45%  CKD stage IV previously on hemodialysis - Dr. Márquez following and appreciated. Patient continues to decline resumption of dialysis at this time  Acute on chronic hypoxic respiratory failure - improved and currently on home fluid with 3 L nasal cannula.  Pt requesting smaller portable O2 device on d/c  Hyperkalemia - In setting of #2, lokelma  Acute on chronic anemia -no evidence of acute bleeding concerns, in setting of kidney disease, no evidence of active bleeding.  Acute nonocclusive DVT in the left popliteal and posterior tibial veins, chronic nonocclusive DVT in the left peroneal vein - hold Eliquis, continue heparin drip  Type 2 diabetes with hyperglycemia -continue low-dose Lantus and sliding scale, last A1c 5.0 as of 2024  Chronic pain - cont neurontin (monitor closely r/t #2), tramadol PRN.  Patient requesting stronger pain medication for multiple pain concerns.  Encourage nursing for warm compress to right antecubital, encourage patient to get out of bed and work with therapy  ESBL - + intraoperative  133 (L) 136 - 145 mmol/L    Potassium 5.7 (H) 3.5 - 5.5 mmol/L    Chloride 101 100 - 111 mmol/L    CO2 23 21 - 32 mmol/L    Anion Gap 9 3.0 - 18 mmol/L    Glucose 161 (H) 74 - 99 mg/dL     (H) 7.0 - 18 MG/DL    Creatinine 3.49 (H) 0.6 - 1.3 MG/DL    Bun/Cre Ratio 33 (H) 12 - 20      Est, Glom Filt Rate 13 (L) >60 ml/min/1.73m2    Calcium 8.4 (L) 8.5 - 10.1 MG/DL    Phosphorus 7.9 (H) 2.5 - 4.9 MG/DL    Albumin 3.4 3.4 - 5.0 g/dL   POCT Glucose    Collection Time: 03/04/24  6:48 AM   Result Value Ref Range    POC Glucose 164 (H) 70 - 110 mg/dL       Results       Procedure Component Value Units Date/Time    COVID-19, Rapid [[id number]] Collected: 03/01/24 2123    Order Status: Canceled Specimen: Nasopharyngeal Swab     COVID-19 & Influenza Combo [[id number]] Collected: 03/01/24 2123    Order Status: Completed Specimen: Nasopharyngeal Updated: 03/01/24 2241     SARS-CoV-2, PCR Not detected        Comment: Not Detected results do not preclude SARS-CoV-2 infection and should not be used as the sole basis for patient management decisions.Results must be combined with clinical observations, patient history, and epidemiological information.        Rapid Influenza A By PCR Not detected        Rapid Influenza B By PCR Not detected        Comment: Testing was performed using mer Feli SARS-CoV-2 and Influenza A/B nucleic acid assay.  This test is a multiplex Real-Time Reverse Transcriptase Polymerase Chain Reaction (RT-PCR) based in vitro diagnostic test intended for the qualitative detection of nucleic acids from SARS-CoV-2, Influenza A, and Influenza B in nasopharyngeal for use under the FDA's Emergency Use Authorization(EAU) only.     Fact sheet for Patients: https://www.fda.gov/media/649250/download  Fact sheet for Healthcare Providers: https://www.fda.fov/media/222100/download         Rapid influenza A/B antigens [[id number]] Collected: 03/01/24 2000    Order Status: Canceled Specimen: Nasopharyngeal

## 2024-03-04 NOTE — FLOWSHEET NOTE
03/04/24 1110   Vital Signs   Temp 97.7 °F (36.5 °C)   Temp Source Oral   Pulse 78   Heart Rate Source Monitor   Respirations 18   /64   MAP (Calculated) 85   BP Method Automatic   Oxygen Therapy   SpO2 99 %     This RN rounded on patient. 5 P's addressed. No further needs or complaints voiced at this time. Patient left with call light and their bed in the lowest position with brakes on.

## 2024-03-04 NOTE — ACP (ADVANCE CARE PLANNING)
Advance Care Planning     General Advance Care Planning (ACP) Conversation    Palliative Medicine    Advance Care Planning Conversation      The patient and/or family consented to a voluntary Advance Care Planning conversation. Individuals present for the conversation: Patient      Outcome of the conversation and documents completed:  Visited patient for new consult along with Palliative team member ISHAN Martel NP. Patient sitting up in the bed, awake, alert and oriented. Pleasant and talkative with our team. Patient reports she had a fall last evening on her way from the bathroom back to her bed. She states she is \"uncomfortable\" all over. Respirations are easy and non labored, oxygen in use. Patient uses oxygen at home as needed, usually 3L. She does report that her breathing is \"a little better\" today.     Pt does have an Advance Directive on file in EMR that names her  as her primary medical decision maker. Patient lives with her  of 48 years, they have 3 grown children. Two children live in [place] and one in the [facility].   Patient has been independent at home up until the last week when she started having an increase of shortness of breath. Her  works outside of the home and she is alone all day. She is able to manage her care well enough until her  gets home.     Goals of care discussion regarding the benefits and burdens of intubation and CPR in the event of respiratory decline or cardiopulmonary arrest, as well as the benefits and burdens of artificial nutrition. Patient voiced understanding, states, \"do whatever you have to do\". Patient also expressed the same wishes on her Advanced Medical Directive. Patient shared that she has had several hospitalizations already this year. Encouraged patient to have ongoing conversations with her  to make sure he understands her wishes. Our team will continue to follow and provide support to Ms Donahue and her family.      ACP

## 2024-03-04 NOTE — SIGNIFICANT EVENT
[facility]  Fall Assessment    Patient: Christy Donahue, 73 y.o. female, : 1950  Patient MRN: [id number]    Admission Date: 3/1/2024   Admission Diagnosis: Normocytic anemia [D64.9]  Essential hypertension [I10]  NSTEMI (non-ST elevated myocardial infarction) (HCC) [I21.4]  Elevated brain natriuretic peptide (BNP) level [R79.89]  On anticoagulant therapy [Z79.01]  Acute decompensated heart failure (HCC) [I50.9]  Acute kidney injury superimposed on CKD (HCC) [N17.9, N18.9]  Deep vein thrombosis (DVT) of proximal lower extremity, unspecified chronicity, unspecified laterality (HCC) [I82.4Y9]  Acute on chronic congestive heart failure, unspecified heart failure type (HCC) [I50.9]    Subjective  Called to bedside to evaluate pt who has fallen. Patient sustained a ground level fall while walking to the bathroom. Hit the back of her head on the door as she was falling to the ground. Patient now endorsing new headache. Of note patient is on heparin gtt due to acute dvt's.      Objective  /62   Pulse 80   Temp 97.7 °F (36.5 °C) (Oral)   Resp 20   Ht 1.626 m (5' 4\")   Wt 85.3 kg (188 lb)   SpO2 100%   BMI 32.27 kg/m²     PHYSICAL EXAM  General: in no apparent distress, resting in bed  HEENT: Normocephalic, atraumatic, no bruises/ abrasions/ swelling  Neck: No reported and/or observable change  CVS: regular rate and rhythm, S1, S2 normal, no murmur, click, rub or gallop  Lungs: good air movement bilaterally   MSK: No gross deformities to bilateral upper and lower extremities.   Skin: warm, dry, intact, no significant rashes/petechia/ecchymosis appreciated  Neuro: No focal neurologic deficits, 5/5 strength in UE & LE, sensory globally intact, CN 2-12 intact.   Psych: A&Ox4, appropriate mood and affect     Assessment and Plan  Christy Donahue 73 y.o. year old female admitted for NSTEMI (non-ST elevated myocardial infarction) (HCC) who is status/post fall in house    Ground level fall with Head  trauma  - GLF with head trauma in elderly women on heparin gtt. Now endorsing new headache.  - Neurological examination unremarkable  - PTT: 81, INR 1.4  - No acute abnormalities on CT   - patient on anticoagulation are high risk for delayed intracranial bleeding even with normal initial imaging. Will hold heparin for now and defer to primary team on decision for repeat imaging.  Plan  - dc heparin gtt for now   - recommend fall precautions at discretion of primary team  - recommend q4hr neuro checks for 12-24 hrs at discretion of primary team  - defer decision for later repeat imaging to primary team    Primary attending Dr. Interiano notified and will be taking of care of patient.      Aris Ozuna MD, PGY-1  [facility]  March 4, 2024 12:24 AM

## 2024-03-04 NOTE — PLAN OF CARE
Problem: Safety - Adult  Goal: Free from fall injury  3/4/2024 0855 by Lisseth Madrid RN  Outcome: Not Progressing  3/4/2024 0853 by Lisseth Madrid RN  Outcome: Not Progressing  Flowsheets (Taken 3/4/2024 0853)  Free From Fall Injury:   Instruct family/caregiver on patient safety   Based on caregiver fall risk screen, instruct family/caregiver to ask for assistance with transferring infant if caregiver noted to have fall risk factors  3/4/2024 0847 by Seymour Osorio, RN  Outcome: Progressing     Problem: Safety - Adult  Goal: Free from fall injury  3/4/2024 0855 by Lisseth Madrid RN  Outcome: Not Progressing  3/4/2024 0853 by Lisseth Madrid RN  Outcome: Not Progressing  Flowsheets (Taken 3/4/2024 0853)  Free From Fall Injury:   Instruct family/caregiver on patient safety   Based on caregiver fall risk screen, instruct family/caregiver to ask for assistance with transferring infant if caregiver noted to have fall risk factors  3/4/2024 0847 by Seymour Osorio, RN  Outcome: Progressing

## 2024-03-04 NOTE — FLOWSHEET NOTE
23:24-Pt sustained and unwitnessed fall while attempting to transfer to [facility] without assistance. Pt sitting on floor with no injuries noted upon assessment from nurse, heparin drip stopped. Vitals stable. Pt was safely returned to bed. Pt then stated she had a \"small headache. Nursing supervisor and charge nurse made aware, PFM notified to assess pt.        03/03/24 2324   Vital Signs   Temp 97.7 °F (36.5 °C)   Temp Source Oral   Pulse 80   Heart Rate Source Brachial   /62   MAP (Calculated) 85   Oxygen Therapy   SpO2 100 %   Pulse via Oximetry 80 beats per minute             00:35-Orders for Stat CT ordered, pt taken down immediately on cardiac monitoring and O2.

## 2024-03-04 NOTE — CONSULTS
Palliative Medicine  [facility]: [phone]  [facility]: [phone]   [facility]: [phone]    Patient Name: Christy Donahue  YOB: 1950    Date of Initial Consult: 3/4/2024   Reason for Consult: goals of care   Requesting Provider: Dr Noel    Primary Care Physician: Kalli Louise, ESTRADA - NP      SUMMARY:   Christy Donahue is a 73 y.o. year old with a past history of hypertension, diabetes type 2, CKD 4 congestive heart failure, who was admitted on 3/1/2024 from home with a diagnosis of NSTEMI and acute on chronic heart failure.  Patient presented to the ER with increased shortness of breath substernal chest pressure.  She was admitted for an NSTEMI cardiology was consulted and also for acute on chronic heart failure she was given IV Bumex 2 mg.  Current medical issues leading to Palliative Medicine involvement include: Goals of care discussions.     PALLIATIVE DIAGNOSES:   Goals of care/advance care plan discussions  NSTEMI  Acute on chronic congestive heart failure  Shortness of breath  COPD       PLAN:   Goals of care/advance care plan discussions    3/4/2024 seen along with LISETTE Menendez.  Patient was reclining in bed she is alert and oriented x 3 she is currently able to participate in her goals of care discussions.  We introduced our team and purpose for visit.  Patient tells that she is not doing well she feels horrible.  She shared she had a recent fall and rapid was called CT was performed of her head with no acute findings.  She feels sore all over.  Patient lives at home with her  she has a total of 3 children.  She tells that she can participate in all of her ADLs.  Her  works during the day she is able to cook for both of them.  She does not currently drive.  There is an AMD on file naming her  she wished for no changes.  Patient with multiple comorbidities and frequent hospitalizations due to shortness of breath  suppository 650 mg  650 mg Rectal Q6H PRN    albuterol (PROVENTIL) (2.5 MG/3ML) 0.083% nebulizer solution   Nebulization Q4H PRN    allopurinol (ZYLOPRIM) tablet 100 mg  100 mg Oral Daily    aspirin chewable tablet 81 mg  81 mg Oral Daily    atorvastatin (LIPITOR) tablet 80 mg  80 mg Oral Nightly    bumetanide (BUMEX) tablet 2 mg  2 mg Oral BID    famotidine (PEPCID) tablet 20 mg  20 mg Oral Daily    folic acid (FOLVITE) tablet 1 mg  1 mg Oral Daily    gabapentin (NEURONTIN) capsule 400 mg  400 mg Oral QPM    nitroGLYCERIN (NITROSTAT) SL tablet 0.4 mg  0.4 mg SubLINGual Q5 Min PRN    vitamin B-12 (CYANOCOBALAMIN) tablet 1,000 mcg  1,000 mcg Oral Daily    metoprolol succinate (TOPROL XL) extended release tablet 50 mg  50 mg Oral Daily        LAB AND IMAGING FINDINGS:     Lab Results   Component Value Date/Time    WBC 12.8 03/03/2024 04:18 AM    HGB 9.0 03/03/2024 04:18 AM     03/03/2024 04:18 AM     Lab Results   Component Value Date/Time     03/04/2024 04:00 AM    K 5.7 03/04/2024 04:00 AM     03/04/2024 04:00 AM    CO2 23 03/04/2024 04:00 AM     03/04/2024 04:00 AM    MG 1.8 03/03/2024 04:18 AM    PHOS 7.9 03/04/2024 04:00 AM      Lab Results   Component Value Date/Time    GLOB 2.5 03/01/2024 07:55 PM     Lab Results   Component Value Date/Time    INR 1.4 03/01/2024 07:55 PM    APTT 22.9 03/04/2024 04:00 AM    APTT 35.8 03/19/2021 04:00 AM      Lab Results   Component Value Date/Time    IRON 40 02/01/2024 04:24 AM    TIBC 256 02/01/2024 04:24 AM      No results found for: \"PH\", \"PCO2\", \"PO2\"  No components found for: \"GLPOC\"   Lab Results   Component Value Date/Time    CKMB 2.0 12/16/2021 09:55 PM    TROPONINI <0.02 03/19/2021 04:00 AM              Total time: 55 minutes

## 2024-03-05 LAB
ALBUMIN SERPL-MCNC: 3.4 G/DL (ref 3.4–5)
ANION GAP SERPL CALC-SCNC: 8 MMOL/L (ref 3–18)
APTT PPP: 103.7 SEC (ref 23–36.4)
APTT PPP: 41.6 SEC (ref 23–36.4)
APTT PPP: 97.9 SEC (ref 23–36.4)
BUN SERPL-MCNC: 134 MG/DL (ref 7–18)
BUN/CREAT SERPL: 37 (ref 12–20)
CALCIUM SERPL-MCNC: 8.2 MG/DL (ref 8.5–10.1)
CHLORIDE SERPL-SCNC: 97 MMOL/L (ref 100–111)
CO2 SERPL-SCNC: 25 MMOL/L (ref 21–32)
CREAT SERPL-MCNC: 3.67 MG/DL (ref 0.6–1.3)
ERYTHROCYTE [DISTWIDTH] IN BLOOD BY AUTOMATED COUNT: 16.1 % (ref 11.6–14.5)
GLUCOSE BLD STRIP.AUTO-MCNC: 162 MG/DL (ref 70–110)
GLUCOSE BLD STRIP.AUTO-MCNC: 180 MG/DL (ref 70–110)
GLUCOSE BLD STRIP.AUTO-MCNC: 222 MG/DL (ref 70–110)
GLUCOSE BLD STRIP.AUTO-MCNC: 235 MG/DL (ref 70–110)
GLUCOSE SERPL-MCNC: 214 MG/DL (ref 74–99)
HCT VFR BLD AUTO: 29.8 % (ref 35–45)
HGB BLD-MCNC: 9.8 G/DL (ref 12–16)
MCH RBC QN AUTO: 30.2 PG (ref 24–34)
MCHC RBC AUTO-ENTMCNC: 32.9 G/DL (ref 31–37)
MCV RBC AUTO: 91.7 FL (ref 78–100)
NRBC # BLD: 0.21 K/UL (ref 0–0.01)
NRBC BLD-RTO: 1.4 PER 100 WBC
PHOSPHATE SERPL-MCNC: 7.5 MG/DL (ref 2.5–4.9)
PLATELET # BLD AUTO: 350 K/UL (ref 135–420)
PMV BLD AUTO: 9.6 FL (ref 9.2–11.8)
POTASSIUM SERPL-SCNC: 5.4 MMOL/L (ref 3.5–5.5)
PROCALCITONIN SERPL-MCNC: 0.13 NG/ML
RBC # BLD AUTO: 3.25 M/UL (ref 4.2–5.3)
SODIUM SERPL-SCNC: 130 MMOL/L (ref 136–145)
WBC # BLD AUTO: 14.5 K/UL (ref 4.6–13.2)

## 2024-03-05 PROCEDURE — 6370000000 HC RX 637 (ALT 250 FOR IP): Performed by: STUDENT IN AN ORGANIZED HEALTH CARE EDUCATION/TRAINING PROGRAM

## 2024-03-05 PROCEDURE — 6370000000 HC RX 637 (ALT 250 FOR IP): Performed by: INTERNAL MEDICINE

## 2024-03-05 PROCEDURE — 85027 COMPLETE CBC AUTOMATED: CPT

## 2024-03-05 PROCEDURE — 6370000000 HC RX 637 (ALT 250 FOR IP): Performed by: HOSPITALIST

## 2024-03-05 PROCEDURE — 6360000002 HC RX W HCPCS: Performed by: INTERNAL MEDICINE

## 2024-03-05 PROCEDURE — 94640 AIRWAY INHALATION TREATMENT: CPT

## 2024-03-05 PROCEDURE — 94761 N-INVAS EAR/PLS OXIMETRY MLT: CPT

## 2024-03-05 PROCEDURE — 99232 SBSQ HOSP IP/OBS MODERATE 35: CPT | Performed by: INTERNAL MEDICINE

## 2024-03-05 PROCEDURE — 1100000003 HC PRIVATE W/ TELEMETRY

## 2024-03-05 PROCEDURE — 2580000003 HC RX 258: Performed by: STUDENT IN AN ORGANIZED HEALTH CARE EDUCATION/TRAINING PROGRAM

## 2024-03-05 PROCEDURE — 84145 PROCALCITONIN (PCT): CPT

## 2024-03-05 PROCEDURE — 82962 GLUCOSE BLOOD TEST: CPT

## 2024-03-05 PROCEDURE — 80069 RENAL FUNCTION PANEL: CPT

## 2024-03-05 PROCEDURE — 6360000002 HC RX W HCPCS: Performed by: HOSPITALIST

## 2024-03-05 PROCEDURE — 6360000002 HC RX W HCPCS: Performed by: STUDENT IN AN ORGANIZED HEALTH CARE EDUCATION/TRAINING PROGRAM

## 2024-03-05 PROCEDURE — 36415 COLL VENOUS BLD VENIPUNCTURE: CPT

## 2024-03-05 PROCEDURE — 85730 THROMBOPLASTIN TIME PARTIAL: CPT

## 2024-03-05 RX ORDER — ALLOPURINOL 100 MG/1
100 TABLET ORAL
Status: DISCONTINUED | OUTPATIENT
Start: 2024-03-06 | End: 2024-03-09 | Stop reason: HOSPADM

## 2024-03-05 RX ORDER — MORPHINE SULFATE 2 MG/ML
2 INJECTION, SOLUTION INTRAMUSCULAR; INTRAVENOUS ONCE
Status: COMPLETED | OUTPATIENT
Start: 2024-03-05 | End: 2024-03-05

## 2024-03-05 RX ADMIN — SODIUM CHLORIDE, PRESERVATIVE FREE 10 ML: 5 INJECTION INTRAVENOUS at 09:15

## 2024-03-05 RX ADMIN — SODIUM CHLORIDE, PRESERVATIVE FREE 10 ML: 5 INJECTION INTRAVENOUS at 20:53

## 2024-03-05 RX ADMIN — SODIUM ZIRCONIUM CYCLOSILICATE 5 G: 5 POWDER, FOR SUSPENSION ORAL at 20:53

## 2024-03-05 RX ADMIN — HEPARIN SODIUM 13 UNITS/KG/HR: 10000 INJECTION, SOLUTION INTRAVENOUS at 15:17

## 2024-03-05 RX ADMIN — ACETAMINOPHEN 325MG 650 MG: 325 TABLET ORAL at 23:32

## 2024-03-05 RX ADMIN — ARFORMOTEROL TARTRATE 15 MCG: 15 SOLUTION RESPIRATORY (INHALATION) at 08:00

## 2024-03-05 RX ADMIN — TRAMADOL HYDROCHLORIDE 25 MG: 50 TABLET ORAL at 21:01

## 2024-03-05 RX ADMIN — HEPARIN SODIUM 4000 UNITS: 1000 INJECTION INTRAVENOUS; SUBCUTANEOUS at 02:22

## 2024-03-05 RX ADMIN — ATORVASTATIN CALCIUM 80 MG: 40 TABLET, FILM COATED ORAL at 20:52

## 2024-03-05 RX ADMIN — FOLIC ACID 1 MG: 1 TABLET ORAL at 09:15

## 2024-03-05 RX ADMIN — TRAMADOL HYDROCHLORIDE 25 MG: 50 TABLET ORAL at 09:18

## 2024-03-05 RX ADMIN — FAMOTIDINE 20 MG: 20 TABLET ORAL at 09:15

## 2024-03-05 RX ADMIN — INSULIN GLARGINE 4 UNITS: 100 INJECTION, SOLUTION SUBCUTANEOUS at 20:52

## 2024-03-05 RX ADMIN — METOPROLOL SUCCINATE 50 MG: 50 TABLET, EXTENDED RELEASE ORAL at 09:15

## 2024-03-05 RX ADMIN — ASPIRIN 81 MG CHEWABLE TABLET 81 MG: 81 TABLET CHEWABLE at 09:15

## 2024-03-05 RX ADMIN — GABAPENTIN 400 MG: 400 CAPSULE ORAL at 17:52

## 2024-03-05 RX ADMIN — APIXABAN 10 MG: 5 TABLET, FILM COATED ORAL at 20:51

## 2024-03-05 RX ADMIN — HYDROCODONE BITARTRATE AND ACETAMINOPHEN 1 TABLET: 5; 325 TABLET ORAL at 00:41

## 2024-03-05 RX ADMIN — POLYETHYLENE GLYCOL 3350 17 G: 17 POWDER, FOR SOLUTION ORAL at 17:52

## 2024-03-05 RX ADMIN — MORPHINE SULFATE 2 MG: 2 INJECTION, SOLUTION INTRAMUSCULAR; INTRAVENOUS at 12:00

## 2024-03-05 RX ADMIN — CYANOCOBALAMIN TAB 1000 MCG 1000 MCG: 1000 TAB at 09:15

## 2024-03-05 RX ADMIN — HYDROCODONE BITARTRATE AND ACETAMINOPHEN 1 TABLET: 5; 325 TABLET ORAL at 17:52

## 2024-03-05 RX ADMIN — SODIUM ZIRCONIUM CYCLOSILICATE 5 G: 5 POWDER, FOR SUSPENSION ORAL at 09:15

## 2024-03-05 RX ADMIN — INSULIN LISPRO 1 UNITS: 100 INJECTION, SOLUTION INTRAVENOUS; SUBCUTANEOUS at 11:55

## 2024-03-05 RX ADMIN — ARFORMOTEROL TARTRATE 15 MCG: 15 SOLUTION RESPIRATORY (INHALATION) at 20:36

## 2024-03-05 ASSESSMENT — PAIN DESCRIPTION - LOCATION
LOCATION: LEG
LOCATION: NECK
LOCATION: GENERALIZED
LOCATION: GENERALIZED
LOCATION: HEAD
LOCATION: NECK
LOCATION: LEG

## 2024-03-05 ASSESSMENT — PAIN DESCRIPTION - DESCRIPTORS
DESCRIPTORS: STABBING
DESCRIPTORS: ACHING
DESCRIPTORS: THROBBING
DESCRIPTORS: THROBBING
DESCRIPTORS: ACHING
DESCRIPTORS: THROBBING
DESCRIPTORS: THROBBING

## 2024-03-05 ASSESSMENT — PAIN SCALES - WONG BAKER
WONGBAKER_NUMERICALRESPONSE: 8
WONGBAKER_NUMERICALRESPONSE: 0

## 2024-03-05 ASSESSMENT — PAIN SCALES - GENERAL
PAINLEVEL_OUTOF10: 0
PAINLEVEL_OUTOF10: 8
PAINLEVEL_OUTOF10: 10
PAINLEVEL_OUTOF10: 0
PAINLEVEL_OUTOF10: 10
PAINLEVEL_OUTOF10: 0
PAINLEVEL_OUTOF10: 10
PAINLEVEL_OUTOF10: 0
PAINLEVEL_OUTOF10: 10
PAINLEVEL_OUTOF10: 0

## 2024-03-05 ASSESSMENT — PAIN DESCRIPTION - ORIENTATION
ORIENTATION: RIGHT;LEFT
ORIENTATION: RIGHT
ORIENTATION: RIGHT
ORIENTATION: RIGHT;LEFT
ORIENTATION: RIGHT

## 2024-03-05 ASSESSMENT — PAIN - FUNCTIONAL ASSESSMENT
PAIN_FUNCTIONAL_ASSESSMENT: ACTIVITIES ARE NOT PREVENTED

## 2024-03-05 NOTE — PROGRESS NOTES
Pt c/o pain in legs of a 8; pain scale 0-10; NORCO 5-325 mg for pt's pain (1 tablet) administered; will intervene as needed

## 2024-03-05 NOTE — PLAN OF CARE
Problem: Discharge Planning  Goal: Discharge to home or other facility with appropriate resources  Outcome: Progressing  Flowsheets (Taken 3/4/2024 2000)  Discharge to home or other facility with appropriate resources:   Identify barriers to discharge with patient and caregiver   Arrange for needed discharge resources and transportation as appropriate   Identify discharge learning needs (meds, wound care, etc)     Problem: Pain  Goal: Verbalizes/displays adequate comfort level or baseline comfort level  Outcome: Progressing     Problem: Safety - Adult  Goal: Free from fall injury  Outcome: Progressing     Problem: Skin/Tissue Integrity  Goal: Absence of new skin breakdown  Description: 1.  Monitor for areas of redness and/or skin breakdown  2.  Assess vascular access sites hourly  3.  Every 4-6 hours minimum:  Change oxygen saturation probe site  4.  Every 4-6 hours:  If on nasal continuous positive airway pressure, respiratory therapy assess nares and determine need for appliance change or resting period.  Outcome: Progressing     Problem: Chronic Conditions and Co-morbidities  Goal: Patient's chronic conditions and co-morbidity symptoms are monitored and maintained or improved  Outcome: Progressing  Flowsheets (Taken 3/4/2024 2000)  Care Plan - Patient's Chronic Conditions and Co-Morbidity Symptoms are Monitored and Maintained or Improved:   Monitor and assess patient's chronic conditions and comorbid symptoms for stability, deterioration, or improvement   Update acute care plan with appropriate goals if chronic or comorbid symptoms are exacerbated and prevent overall improvement and discharge      Will try omeprazole for nausea for 1-2 weeks, can try imodium if diarrhea has not resolved in 1 week. Patient will get back to me if anything.

## 2024-03-05 NOTE — PROGRESS NOTES
Pt still complaining of pain of a 10; pain scale 0-10; asked pt if she would like tylenol; pt stated, \"No, tylenol does nothing for me and I would like a stronger medication\", I explained to pt that she only received a one time does of morphine 2mg that was administered at 12pm and traMADol at 0918 this morning\" and that I could give her tylenol or either Norco; pt declined; MD is aware

## 2024-03-05 NOTE — PROGRESS NOTES
Pt c/o of pain in her R neck of a 10; pain scale 0-10; MD ordered morphine (PF) injection 2mg once for pt's pain; will intervene as needed

## 2024-03-05 NOTE — PROGRESS NOTES
Received report from LISETTE Donahue. Pt AAOx3, NAD, breathing non labored, on room air, HOB up. CVC site clean, dry and intact. Heparin drip going per order-handsoff rate  verification done w/ off going RN. Avaseur  camera on in the room. Bed at the lowest level on lock position, call bell w/i reach. Bed alarm on.

## 2024-03-05 NOTE — PLAN OF CARE
Problem: Discharge Planning  Goal: Discharge to home or other facility with appropriate resources  Outcome: Progressing  Flowsheets (Taken 3/5/2024 0800)  Discharge to home or other facility with appropriate resources: Identify barriers to discharge with patient and caregiver     Problem: Pain  Goal: Verbalizes/displays adequate comfort level or baseline comfort level  Outcome: Progressing     Problem: Safety - Adult  Goal: Free from fall injury  Outcome: Progressing     Problem: Chronic Conditions and Co-morbidities  Goal: Patient's chronic conditions and co-morbidity symptoms are monitored and maintained or improved  Recent Flowsheet Documentation  Taken 3/5/2024 0800 by Henrietta Becerra, RN  Care Plan - Patient's Chronic Conditions and Co-Morbidity Symptoms are Monitored and Maintained or Improved: Monitor and assess patient's chronic conditions and comorbid symptoms for stability, deterioration, or improvement

## 2024-03-05 NOTE — PROGRESS NOTES
dialysis ,hold diuretics   2.hyperkalemia,continue lokelma  3- anemia,start epo    D/w Dr. NERISSA OLSEN MD  Nephrology  3/5/2024

## 2024-03-06 LAB
ALBUMIN SERPL-MCNC: 3.6 G/DL (ref 3.4–5)
ALBUMIN/GLOB SERPL: 1.3 (ref 0.8–1.7)
ALP SERPL-CCNC: 129 U/L (ref 45–117)
ALT SERPL-CCNC: 15 U/L (ref 13–56)
ANION GAP SERPL CALC-SCNC: 9 MMOL/L (ref 3–18)
AST SERPL-CCNC: 9 U/L (ref 10–38)
BASOPHILS # BLD: 0 K/UL (ref 0–0.1)
BASOPHILS NFR BLD: 0 % (ref 0–2)
BILIRUB SERPL-MCNC: 0.8 MG/DL (ref 0.2–1)
BUN SERPL-MCNC: 156 MG/DL (ref 7–18)
BUN/CREAT SERPL: 50 (ref 12–20)
CALCIUM SERPL-MCNC: 8.8 MG/DL (ref 8.5–10.1)
CHLORIDE SERPL-SCNC: 98 MMOL/L (ref 100–111)
CO2 SERPL-SCNC: 25 MMOL/L (ref 21–32)
CREAT SERPL-MCNC: 3.15 MG/DL (ref 0.6–1.3)
DIFFERENTIAL METHOD BLD: ABNORMAL
EOSINOPHIL # BLD: 0 K/UL (ref 0–0.4)
EOSINOPHIL NFR BLD: 0 % (ref 0–5)
ERYTHROCYTE [DISTWIDTH] IN BLOOD BY AUTOMATED COUNT: 16.1 % (ref 11.6–14.5)
GLOBULIN SER CALC-MCNC: 2.7 G/DL (ref 2–4)
GLUCOSE BLD STRIP.AUTO-MCNC: 121 MG/DL (ref 70–110)
GLUCOSE BLD STRIP.AUTO-MCNC: 148 MG/DL (ref 70–110)
GLUCOSE BLD STRIP.AUTO-MCNC: 198 MG/DL (ref 70–110)
GLUCOSE SERPL-MCNC: 151 MG/DL (ref 74–99)
HCT VFR BLD AUTO: 29.7 % (ref 35–45)
HGB BLD-MCNC: 9.6 G/DL (ref 12–16)
IMM GRANULOCYTES # BLD AUTO: 0.4 K/UL (ref 0–0.04)
IMM GRANULOCYTES NFR BLD AUTO: 3 % (ref 0–0.5)
LYMPHOCYTES # BLD: 1.1 K/UL (ref 0.9–3.6)
LYMPHOCYTES NFR BLD: 7 % (ref 21–52)
MCH RBC QN AUTO: 29.4 PG (ref 24–34)
MCHC RBC AUTO-ENTMCNC: 32.3 G/DL (ref 31–37)
MCV RBC AUTO: 91.1 FL (ref 78–100)
MONOCYTES # BLD: 1.1 K/UL (ref 0.05–1.2)
MONOCYTES NFR BLD: 7 % (ref 3–10)
NEUTS SEG # BLD: 12.9 K/UL (ref 1.8–8)
NEUTS SEG NFR BLD: 83 % (ref 40–73)
NRBC # BLD: 0.2 K/UL (ref 0–0.01)
NRBC BLD-RTO: 1.3 PER 100 WBC
PLATELET # BLD AUTO: 345 K/UL (ref 135–420)
PMV BLD AUTO: 9.8 FL (ref 9.2–11.8)
POTASSIUM SERPL-SCNC: 5.1 MMOL/L (ref 3.5–5.5)
PROT SERPL-MCNC: 6.3 G/DL (ref 6.4–8.2)
RBC # BLD AUTO: 3.26 M/UL (ref 4.2–5.3)
SODIUM SERPL-SCNC: 132 MMOL/L (ref 136–145)
WBC # BLD AUTO: 15.5 K/UL (ref 4.6–13.2)

## 2024-03-06 PROCEDURE — 82962 GLUCOSE BLOOD TEST: CPT

## 2024-03-06 PROCEDURE — 94761 N-INVAS EAR/PLS OXIMETRY MLT: CPT

## 2024-03-06 PROCEDURE — 85025 COMPLETE CBC W/AUTO DIFF WBC: CPT

## 2024-03-06 PROCEDURE — 2580000003 HC RX 258: Performed by: STUDENT IN AN ORGANIZED HEALTH CARE EDUCATION/TRAINING PROGRAM

## 2024-03-06 PROCEDURE — 6370000000 HC RX 637 (ALT 250 FOR IP): Performed by: INTERNAL MEDICINE

## 2024-03-06 PROCEDURE — 80053 COMPREHEN METABOLIC PANEL: CPT

## 2024-03-06 PROCEDURE — 5A1D70Z PERFORMANCE OF URINARY FILTRATION, INTERMITTENT, LESS THAN 6 HOURS PER DAY: ICD-10-PCS | Performed by: INTERNAL MEDICINE

## 2024-03-06 PROCEDURE — 1100000003 HC PRIVATE W/ TELEMETRY

## 2024-03-06 PROCEDURE — 6370000000 HC RX 637 (ALT 250 FOR IP): Performed by: HOSPITALIST

## 2024-03-06 PROCEDURE — 90935 HEMODIALYSIS ONE EVALUATION: CPT

## 2024-03-06 PROCEDURE — 6370000000 HC RX 637 (ALT 250 FOR IP): Performed by: STUDENT IN AN ORGANIZED HEALTH CARE EDUCATION/TRAINING PROGRAM

## 2024-03-06 PROCEDURE — 6360000002 HC RX W HCPCS: Performed by: INTERNAL MEDICINE

## 2024-03-06 PROCEDURE — 99232 SBSQ HOSP IP/OBS MODERATE 35: CPT | Performed by: INTERNAL MEDICINE

## 2024-03-06 RX ORDER — SEVELAMER CARBONATE 800 MG/1
800 TABLET, FILM COATED ORAL
Status: DISCONTINUED | OUTPATIENT
Start: 2024-03-06 | End: 2024-03-09 | Stop reason: HOSPADM

## 2024-03-06 RX ADMIN — ALLOPURINOL 100 MG: 100 TABLET ORAL at 09:46

## 2024-03-06 RX ADMIN — SODIUM CHLORIDE, PRESERVATIVE FREE 10 ML: 5 INJECTION INTRAVENOUS at 09:42

## 2024-03-06 RX ADMIN — GABAPENTIN 400 MG: 400 CAPSULE ORAL at 18:54

## 2024-03-06 RX ADMIN — TRAMADOL HYDROCHLORIDE 25 MG: 50 TABLET ORAL at 09:41

## 2024-03-06 RX ADMIN — SODIUM ZIRCONIUM CYCLOSILICATE 5 G: 5 POWDER, FOR SUSPENSION ORAL at 09:41

## 2024-03-06 RX ADMIN — HYDROMORPHONE HYDROCHLORIDE 0.5 MG: 1 INJECTION, SOLUTION INTRAMUSCULAR; INTRAVENOUS; SUBCUTANEOUS at 14:24

## 2024-03-06 RX ADMIN — HYDROMORPHONE HYDROCHLORIDE 0.5 MG: 1 INJECTION, SOLUTION INTRAMUSCULAR; INTRAVENOUS; SUBCUTANEOUS at 20:29

## 2024-03-06 RX ADMIN — APIXABAN 10 MG: 5 TABLET, FILM COATED ORAL at 20:31

## 2024-03-06 RX ADMIN — INSULIN GLARGINE 4 UNITS: 100 INJECTION, SOLUTION SUBCUTANEOUS at 23:30

## 2024-03-06 RX ADMIN — ASPIRIN 81 MG CHEWABLE TABLET 81 MG: 81 TABLET CHEWABLE at 09:40

## 2024-03-06 RX ADMIN — APIXABAN 10 MG: 5 TABLET, FILM COATED ORAL at 09:40

## 2024-03-06 RX ADMIN — FOLIC ACID 1 MG: 1 TABLET ORAL at 09:40

## 2024-03-06 RX ADMIN — METOPROLOL SUCCINATE 50 MG: 50 TABLET, EXTENDED RELEASE ORAL at 09:41

## 2024-03-06 RX ADMIN — FAMOTIDINE 20 MG: 20 TABLET ORAL at 09:40

## 2024-03-06 RX ADMIN — CYANOCOBALAMIN TAB 1000 MCG 1000 MCG: 1000 TAB at 09:41

## 2024-03-06 RX ADMIN — ATORVASTATIN CALCIUM 80 MG: 40 TABLET, FILM COATED ORAL at 20:31

## 2024-03-06 RX ADMIN — EPOETIN ALFA-EPBX 5000 UNITS: 3000 INJECTION, SOLUTION INTRAVENOUS; SUBCUTANEOUS at 20:38

## 2024-03-06 RX ADMIN — SEVELAMER CARBONATE 800 MG: 800 TABLET, FILM COATED ORAL at 13:00

## 2024-03-06 RX ADMIN — SODIUM CHLORIDE, PRESERVATIVE FREE 10 ML: 5 INJECTION INTRAVENOUS at 20:38

## 2024-03-06 ASSESSMENT — PAIN DESCRIPTION - FREQUENCY: FREQUENCY: CONTINUOUS

## 2024-03-06 ASSESSMENT — PAIN DESCRIPTION - LOCATION
LOCATION: GENERALIZED
LOCATION: ABDOMEN;LEG;NECK
LOCATION: ABDOMEN;LEG;NECK
LOCATION: ABDOMEN;NECK;LEG

## 2024-03-06 ASSESSMENT — PAIN DESCRIPTION - ONSET: ONSET: PROGRESSIVE

## 2024-03-06 ASSESSMENT — PAIN SCALES - GENERAL
PAINLEVEL_OUTOF10: 8
PAINLEVEL_OUTOF10: 8
PAINLEVEL_OUTOF10: 10
PAINLEVEL_OUTOF10: 10
PAINLEVEL_OUTOF10: 0

## 2024-03-06 ASSESSMENT — PAIN DESCRIPTION - ORIENTATION
ORIENTATION: RIGHT;LEFT;LOWER
ORIENTATION: OTHER (COMMENT)
ORIENTATION: RIGHT;LEFT;LOWER
ORIENTATION: RIGHT;LEFT;LOWER

## 2024-03-06 ASSESSMENT — PAIN SCALES - WONG BAKER
WONGBAKER_NUMERICALRESPONSE: 0
WONGBAKER_NUMERICALRESPONSE: 8
WONGBAKER_NUMERICALRESPONSE: 0

## 2024-03-06 ASSESSMENT — PAIN DESCRIPTION - DESCRIPTORS
DESCRIPTORS: ACHING;THROBBING
DESCRIPTORS: ACHING;THROBBING
DESCRIPTORS: ACHING

## 2024-03-06 ASSESSMENT — PAIN DESCRIPTION - PAIN TYPE: TYPE: CHRONIC PAIN

## 2024-03-06 NOTE — CARE COORDINATION
Dr Olivares let CM know that patient has agreed to Dialysis today, and that patient is a possible discharge on Friday.       Discharge plan continues to be Home with Home Health services, with patient's family to transport patient home when medically stable for discharge.         CM will need Home Health orders when possible for patient.             Haily Kelsey, RN  Case Management [phone]

## 2024-03-06 NOTE — PLAN OF CARE
Problem: Discharge Planning  Goal: Discharge to home or other facility with appropriate resources  3/6/2024 1203 by Henrietta Becerra, RN  Outcome: Progressing  Flowsheets (Taken 3/6/2024 0800)  Discharge to home or other facility with appropriate resources: Identify barriers to discharge with patient and caregiver  3/6/2024 0128 by Darcy Tanner, RN  Outcome: Progressing  Flowsheets (Taken 3/5/2024 2000)  Discharge to home or other facility with appropriate resources:   Identify barriers to discharge with patient and caregiver   Arrange for needed discharge resources and transportation as appropriate   Identify discharge learning needs (meds, wound care, etc)     Problem: Pain  Goal: Verbalizes/displays adequate comfort level or baseline comfort level  3/6/2024 1203 by Henrietta Becerra RN  Outcome: Not Progressing  3/6/2024 0128 by Darcy Tanner RN  Outcome: Progressing     Problem: Safety - Adult  Goal: Free from fall injury  3/6/2024 1203 by Henrietta Becerra, RN  Outcome: Progressing  3/6/2024 0128 by Darcy Tanner RN  Outcome: Progressing     Problem: Skin/Tissue Integrity  Goal: Absence of new skin breakdown  Description: 1.  Monitor for areas of redness and/or skin breakdown  2.  Assess vascular access sites hourly  3.  Every 4-6 hours minimum:  Change oxygen saturation probe site  4.  Every 4-6 hours:  If on nasal continuous positive airway pressure, respiratory therapy assess nares and determine need for appliance change or resting period.  3/6/2024 0141 by Darcy Tanner, RN  Outcome: Progressing  3/6/2024 0128 by Darcy Tanner, RN  Outcome: Progressing     Problem: Pain  Goal: Verbalizes/displays adequate comfort level or baseline comfort level  3/6/2024 1203 by Henrietta Becerra, RN  Outcome: Not Progressing  3/6/2024 0128 by Darcy Tanner, RN  Outcome: Progressing

## 2024-03-06 NOTE — PROGRESS NOTES
[facility]  Progress Note    Patient: Christy Donahue Age: 73 y.o. : 1950 MR#: [id number] SSN: [id number]  Date/Time: 3/6/2024     Subjective:   Patient notes generalized body pain. Discussed with her this is possibly due to uremia, added extra pain medicine. Patient agreeable to dialysis after discussion.    Review of systems  General: No fevers or chills.  Cardiovascular: No chest pain or pressure. No palpitations.   Pulmonary: No shortness of breath, cough or wheeze.   Gastrointestinal: No abdominal pain, nausea, vomiting or diarrhea.   Genitourinary: No urinary frequency, urgency, hesitancy or dysuria.   Musculoskeletal: No joint or muscle pain, no back pain, no recent trauma.    Neurologic: No headache, numbness, tingling or weakness.   Assessment/Plan:   Acute decompensated heart failure -diuresis as per nephrology, strict I's and O's.  Net output -1.4 L Last echo with EF of 40 to 45%  CKD stage IV previously on hemodialysis - Dr. Márquez following and appreciated. Patient agreeable to dialysis today, Uremia likely driving body pain  Acute on chronic hypoxic respiratory failure - improved and currently on room air.  Hyperkalemia - In setting of #2, lokelma  Acute on chronic anemia -no evidence of acute bleeding concerns, in setting of kidney disease, no evidence of active bleeding.  Acute nonocclusive DVT in the left popliteal and posterior tibial veins, chronic nonocclusive DVT in the left peroneal vein - resume eliquis  Type 2 diabetes with hyperglycemia -continue low-dose Lantus and sliding scale, last A1c 5.0 as of 2024  Chronic pain - cont neurontin (monitor closely r/t #2), tramadol PRN.  Patient requesting stronger pain medication for multiple pain concerns.  added dilaudid for breakthrough pain  ESBL - + intraoperative abdominal fluid cultures positive for ESBL in 2023  Fall in hospital. CT head neg x2. Resume heparin gtt.  Left hip pain,  03/06/24  6:58 AM   Result Value Ref Range    POC Glucose 148 (H) 70 - 110 mg/dL   POCT Glucose    Collection Time: 03/06/24 11:32 AM   Result Value Ref Range    POC Glucose 121 (H) 70 - 110 mg/dL       Signed By: Aldo Olivares DO     March 6, 2024      Disclaimer: Sections of this note are dictated using utilizing voice recognition software.  Minor typographical errors may be present. If questions arise, please do not hesitate to contact me or call our department.

## 2024-03-06 NOTE — PROGRESS NOTES
Pt still complaining of pain even with traMODAl 25mg administered given at 0941am; will notify MD via perfect serve

## 2024-03-06 NOTE — PROGRESS NOTES
Received report from LISETTE Friedman. Pt AAOx3, NAD, breathing non labored, on room air, HOB up. IV site clean, dry and intact. CVC site intact. Bed at the lowest level on lock position, call bell w/i reach. Bed alarm on. Avaseur in the room.

## 2024-03-06 NOTE — PROGRESS NOTES
RENAL DAILY PROGRESS NOTE    Patient: Christy Donahue               Sex: female          DOA: 3/1/2024  8:06 PM        YOB: 1950      Age:  73 y.o.        LOS:  LOS: 5 days     Subjective:     Christy Donahue is a 73 y.o.  who presents with Normocytic anemia [D64.9]  Essential hypertension [I10]  NSTEMI (non-ST elevated myocardial infarction) (HCC) [I21.4]  Elevated brain natriuretic peptide (BNP) level [R79.89]  On anticoagulant therapy [Z79.01]  Acute decompensated heart failure (HCC) [I50.9]  Acute kidney injury superimposed on CKD (HCC) [N17.9, N18.9]  Deep vein thrombosis (DVT) of proximal lower extremity, unspecified chronicity, unspecified laterality (HCC) [I82.4Y9]  Acute on chronic congestive heart failure, unspecified heart failure type (HCC) [I50.9].   73 y o  female with hx of crf stage 4,hx of chf,hx of copd on home oxygen .used to be on dialysis.has access left arm  Chief complains: Patient denies nausea, vomiting,   - Reviewed last 24 hrs events     Current Facility-Administered Medications   Medication Dose Route Frequency    allopurinol (ZYLOPRIM) tablet 100 mg  100 mg Oral Q MWF    apixaban (ELIQUIS) tablet 10 mg  10 mg Oral BID    Followed by    [START ON 3/12/2024] apixaban (ELIQUIS) tablet 5 mg  5 mg Oral BID    sodium zirconium cyclosilicate (LOKELMA) oral suspension 5 g  5 g Oral BID    epoetin agnes-epbx (RETACRIT) 5,000 Units combo injection  5,000 Units SubCUTAneous Once per day on Mon Wed Fri    ipratropium 0.5 mg-albuterol 2.5 mg (DUONEB) nebulizer solution 1 Dose  1 Dose Inhalation Q6H PRN    glucose chewable tablet 16 g  4 tablet Oral PRN    dextrose bolus 10% 125 mL  125 mL IntraVENous PRN    Or    dextrose bolus 10% 250 mL  250 mL IntraVENous PRN    glucagon (rDNA) injection 1 mg  1 mg SubCUTAneous PRN    dextrose 10 % infusion   IntraVENous Continuous PRN    insulin lispro (HUMALOG) injection vial 0-4 Units  0-4 Units SubCUTAneous TID WC  MD  Nephrology  3/6/2024

## 2024-03-06 NOTE — DIALYSIS
HD Care plan  Time: 2.5 hrs  Dialysate:  2 K+   2.5 Ca++  Bath  Net UF: 1 L  Access: Aseptically care for MELL AVF  Hemodynamic stability: Maintain BP WNL     Pre Dialysis:  Pt received from Unit Nurse Henrietta Becerra , pt on a bed, A+O X 4, no s/s of acute distress noted on RA.  MELL AVF assessed, no abnormalities noted, bruit and thrill strong, AVF accessed using 17 G needles without any difficulty. Good flows achieved from both needles.    Intra Dialysis:  Time out / safety process performed per policy, Tx initiated at 1530.    AVF/AVG flowing with ease. For hemodynamic stability UF goal set at 1000 ml as tolerated.  Pt offered assistance with repositioning every 2 hours/prn    Vascular access visible and line connections remained intact throughout entire duration of treatment.   Vital signs checked every 15 mins.     Post Dialysis:  Tx completed at 1800,   Tolerated well , 1 L  removed. De-accessed per protocol.    Clot time 8 minutes for arterial, and 8 minutes for venous.   Dry dressings applied.  Bruit/Thrill present above and below dressings.  Post Dialysis report given to unit Nurse Henrietta

## 2024-03-06 NOTE — PLAN OF CARE
Problem: Discharge Planning  Goal: Discharge to home or other facility with appropriate resources  3/6/2024 0128 by Darcy Tanner, RN  Outcome: Progressing  Flowsheets (Taken 3/5/2024 2000)  Discharge to home or other facility with appropriate resources:   Identify barriers to discharge with patient and caregiver   Arrange for needed discharge resources and transportation as appropriate   Identify discharge learning needs (meds, wound care, etc)  3/5/2024 1709 by Henrietta Becerra RN  Outcome: Progressing  Flowsheets (Taken 3/5/2024 0800)  Discharge to home or other facility with appropriate resources: Identify barriers to discharge with patient and caregiver     Problem: Pain  Goal: Verbalizes/displays adequate comfort level or baseline comfort level  3/6/2024 0128 by Darcy Tanner, RN  Outcome: Progressing  3/5/2024 1709 by Henrietta Becerra RN  Outcome: Progressing     Problem: Safety - Adult  Goal: Free from fall injury  3/6/2024 0128 by Darcy Tanner, RN  Outcome: Progressing  3/5/2024 1709 by Henrietta Becerra RN  Outcome: Progressing     Problem: Skin/Tissue Integrity  Goal: Absence of new skin breakdown  Description: 1.  Monitor for areas of redness and/or skin breakdown  2.  Assess vascular access sites hourly  3.  Every 4-6 hours minimum:  Change oxygen saturation probe site  4.  Every 4-6 hours:  If on nasal continuous positive airway pressure, respiratory therapy assess nares and determine need for appliance change or resting period.  Outcome: Progressing     Problem: Chronic Conditions and Co-morbidities  Goal: Patient's chronic conditions and co-morbidity symptoms are monitored and maintained or improved  Outcome: Progressing  Flowsheets (Taken 3/5/2024 2000)  Care Plan - Patient's Chronic Conditions and Co-Morbidity Symptoms are Monitored and Maintained or Improved:   Monitor and assess patient's chronic conditions and comorbid symptoms for stability, deterioration, or improvement    Collaborate with multidisciplinary team to address chronic and comorbid conditions and prevent exacerbation or deterioration   Update acute care plan with appropriate goals if chronic or comorbid symptoms are exacerbated and prevent overall improvement and discharge

## 2024-03-06 NOTE — PROGRESS NOTES
Received report from LISETTE Friedman. Pt AAOx3, NAD, breathing non labored, on room air, HOB up. IV site clean, dry and intact Bed at the lowest level on lock position, call bell w/i reach. Bed alarm on. Avaseur on; in the room.

## 2024-03-06 NOTE — PROGRESS NOTES
[facility] Hospitalist Group  Progress Note    Patient: Christy Donahue Age: 73 y.o. : 1950 MR#: [id number] SSN: [id number]  Date/Time: 3/5/2024     Subjective:   Patient refusing dialysis, states on Dr. Fowler can tell her she can receive dialysis. Complains of intermittent leg and head pain. No other complaints.    Review of systems  General: No fevers or chills.  Cardiovascular: No chest pain or pressure. No palpitations.   Pulmonary: No shortness of breath, cough or wheeze.   Gastrointestinal: No abdominal pain, nausea, vomiting or diarrhea.   Genitourinary: No urinary frequency, urgency, hesitancy or dysuria.   Musculoskeletal: No joint or muscle pain, no back pain, no recent trauma.    Neurologic: No headache, numbness, tingling or weakness.   Assessment/Plan:   Acute decompensated heart failure -diuresis as per nephrology, strict I's and O's.  Net output -1.4 L Last echo with EF of 40 to 45%  CKD stage IV previously on hemodialysis - Dr. Márquez following and appreciated. Patient continues to decline resumption of dialysis at this time  Acute on chronic hypoxic respiratory failure - improved and currently on room air.  Hyperkalemia - In setting of #2, lokelma  Acute on chronic anemia -no evidence of acute bleeding concerns, in setting of kidney disease, no evidence of active bleeding.  Acute nonocclusive DVT in the left popliteal and posterior tibial veins, chronic nonocclusive DVT in the left peroneal vein - resume eliquis  Type 2 diabetes with hyperglycemia -continue low-dose Lantus and sliding scale, last A1c 5.0 as of 2024  Chronic pain - cont neurontin (monitor closely r/t #2), tramadol PRN.  Patient requesting stronger pain medication for multiple pain concerns.  Encourage nursing for warm compress to right antecubital, encourage patient to get out of bed and work with therapy  ESBL - + intraoperative abdominal fluid cultures positive for ESBL in 2023  Fall in

## 2024-03-06 NOTE — PLAN OF CARE
Problem: Safety - Adult  Goal: Free from fall injury  3/6/2024 0128 by Darcy Tanner, RN  Outcome: Progressing  3/5/2024 1709 by Henrietta Becerra RN  Outcome: Progressing     Problem: Chronic Conditions and Co-morbidities  Goal: Patient's chronic conditions and co-morbidity symptoms are monitored and maintained or improved  3/6/2024 0141 by Darcy Tanner, RN  Outcome: Progressing  3/6/2024 0128 by Darcy Tanner, RN  Outcome: Progressing  Flowsheets (Taken 3/5/2024 2000)  Care Plan - Patient's Chronic Conditions and Co-Morbidity Symptoms are Monitored and Maintained or Improved:   Monitor and assess patient's chronic conditions and comorbid symptoms for stability, deterioration, or improvement   Collaborate with multidisciplinary team to address chronic and comorbid conditions and prevent exacerbation or deterioration   Update acute care plan with appropriate goals if chronic or comorbid symptoms are exacerbated and prevent overall improvement and discharge

## 2024-03-07 LAB
ALBUMIN SERPL-MCNC: 3.7 G/DL (ref 3.4–5)
ALBUMIN/GLOB SERPL: 1.5 (ref 0.8–1.7)
ALP SERPL-CCNC: 132 U/L (ref 45–117)
ALT SERPL-CCNC: 15 U/L (ref 13–56)
ANION GAP SERPL CALC-SCNC: 8 MMOL/L (ref 3–18)
AST SERPL-CCNC: 11 U/L (ref 10–38)
B PERT DNA SPEC QL NAA+PROBE: NOT DETECTED
BILIRUB SERPL-MCNC: 0.7 MG/DL (ref 0.2–1)
BORDETELLA PARAPERTUSSIS BY PCR: NOT DETECTED
BUN SERPL-MCNC: 94 MG/DL (ref 7–18)
BUN/CREAT SERPL: 32 (ref 12–20)
C PNEUM DNA SPEC QL NAA+PROBE: NOT DETECTED
CALCIUM SERPL-MCNC: 8.5 MG/DL (ref 8.5–10.1)
CHLORIDE SERPL-SCNC: 100 MMOL/L (ref 100–111)
CO2 SERPL-SCNC: 28 MMOL/L (ref 21–32)
CREAT SERPL-MCNC: 2.91 MG/DL (ref 0.6–1.3)
ERYTHROCYTE [DISTWIDTH] IN BLOOD BY AUTOMATED COUNT: 16.2 % (ref 11.6–14.5)
FLUAV SUBTYP SPEC NAA+PROBE: NOT DETECTED
FLUBV RNA SPEC QL NAA+PROBE: NOT DETECTED
GLOBULIN SER CALC-MCNC: 2.5 G/DL (ref 2–4)
GLUCOSE BLD STRIP.AUTO-MCNC: 138 MG/DL (ref 70–110)
GLUCOSE BLD STRIP.AUTO-MCNC: 157 MG/DL (ref 70–110)
GLUCOSE BLD STRIP.AUTO-MCNC: 215 MG/DL (ref 70–110)
GLUCOSE SERPL-MCNC: 145 MG/DL (ref 74–99)
HADV DNA SPEC QL NAA+PROBE: NOT DETECTED
HBV SURFACE AG SER QL: <0.1 INDEX
HBV SURFACE AG SER QL: NEGATIVE
HCOV 229E RNA SPEC QL NAA+PROBE: NOT DETECTED
HCOV HKU1 RNA SPEC QL NAA+PROBE: NOT DETECTED
HCOV NL63 RNA SPEC QL NAA+PROBE: NOT DETECTED
HCOV OC43 RNA SPEC QL NAA+PROBE: NOT DETECTED
HCT VFR BLD AUTO: 30.9 % (ref 35–45)
HGB BLD-MCNC: 10.1 G/DL (ref 12–16)
HMPV RNA SPEC QL NAA+PROBE: NOT DETECTED
HPIV1 RNA SPEC QL NAA+PROBE: NOT DETECTED
HPIV2 RNA SPEC QL NAA+PROBE: NOT DETECTED
HPIV3 RNA SPEC QL NAA+PROBE: NOT DETECTED
HPIV4 RNA SPEC QL NAA+PROBE: NOT DETECTED
M PNEUMO DNA SPEC QL NAA+PROBE: NOT DETECTED
MCH RBC QN AUTO: 30 PG (ref 24–34)
MCHC RBC AUTO-ENTMCNC: 32.7 G/DL (ref 31–37)
MCV RBC AUTO: 91.7 FL (ref 78–100)
NRBC # BLD: 0.22 K/UL (ref 0–0.01)
NRBC BLD-RTO: 1.6 PER 100 WBC
PLATELET # BLD AUTO: 327 K/UL (ref 135–420)
PMV BLD AUTO: 9.9 FL (ref 9.2–11.8)
POTASSIUM SERPL-SCNC: 5.2 MMOL/L (ref 3.5–5.5)
PROT SERPL-MCNC: 6.2 G/DL (ref 6.4–8.2)
RBC # BLD AUTO: 3.37 M/UL (ref 4.2–5.3)
RSV RNA SPEC QL NAA+PROBE: NOT DETECTED
RV+EV RNA SPEC QL NAA+PROBE: NOT DETECTED
SARS-COV-2 RNA RESP QL NAA+PROBE: NOT DETECTED
SODIUM SERPL-SCNC: 136 MMOL/L (ref 136–145)
WBC # BLD AUTO: 13.8 K/UL (ref 4.6–13.2)

## 2024-03-07 PROCEDURE — 87340 HEPATITIS B SURFACE AG IA: CPT

## 2024-03-07 PROCEDURE — 36592 COLLECT BLOOD FROM PICC: CPT

## 2024-03-07 PROCEDURE — 99232 SBSQ HOSP IP/OBS MODERATE 35: CPT | Performed by: INTERNAL MEDICINE

## 2024-03-07 PROCEDURE — 2580000003 HC RX 258: Performed by: STUDENT IN AN ORGANIZED HEALTH CARE EDUCATION/TRAINING PROGRAM

## 2024-03-07 PROCEDURE — 90935 HEMODIALYSIS ONE EVALUATION: CPT

## 2024-03-07 PROCEDURE — 6360000002 HC RX W HCPCS: Performed by: INTERNAL MEDICINE

## 2024-03-07 PROCEDURE — 6370000000 HC RX 637 (ALT 250 FOR IP): Performed by: STUDENT IN AN ORGANIZED HEALTH CARE EDUCATION/TRAINING PROGRAM

## 2024-03-07 PROCEDURE — 82962 GLUCOSE BLOOD TEST: CPT

## 2024-03-07 PROCEDURE — 1100000003 HC PRIVATE W/ TELEMETRY

## 2024-03-07 PROCEDURE — 2580000003 HC RX 258: Performed by: INTERNAL MEDICINE

## 2024-03-07 PROCEDURE — 85027 COMPLETE CBC AUTOMATED: CPT

## 2024-03-07 PROCEDURE — 86706 HEP B SURFACE ANTIBODY: CPT

## 2024-03-07 PROCEDURE — 6370000000 HC RX 637 (ALT 250 FOR IP): Performed by: INTERNAL MEDICINE

## 2024-03-07 PROCEDURE — 80053 COMPREHEN METABOLIC PANEL: CPT

## 2024-03-07 PROCEDURE — 0202U NFCT DS 22 TRGT SARS-COV-2: CPT

## 2024-03-07 PROCEDURE — 86704 HEP B CORE ANTIBODY TOTAL: CPT

## 2024-03-07 PROCEDURE — 6370000000 HC RX 637 (ALT 250 FOR IP): Performed by: HOSPITALIST

## 2024-03-07 RX ADMIN — TRAMADOL HYDROCHLORIDE 25 MG: 50 TABLET ORAL at 21:16

## 2024-03-07 RX ADMIN — APIXABAN 10 MG: 5 TABLET, FILM COATED ORAL at 21:11

## 2024-03-07 RX ADMIN — SODIUM CHLORIDE, PRESERVATIVE FREE 10 ML: 5 INJECTION INTRAVENOUS at 21:18

## 2024-03-07 RX ADMIN — FOLIC ACID 1 MG: 1 TABLET ORAL at 09:13

## 2024-03-07 RX ADMIN — ACETAMINOPHEN 325MG 650 MG: 325 TABLET ORAL at 16:38

## 2024-03-07 RX ADMIN — GABAPENTIN 400 MG: 400 CAPSULE ORAL at 17:35

## 2024-03-07 RX ADMIN — INSULIN GLARGINE 4 UNITS: 100 INJECTION, SOLUTION SUBCUTANEOUS at 21:10

## 2024-03-07 RX ADMIN — SODIUM CHLORIDE 300 MG: 9 INJECTION, SOLUTION INTRAVENOUS at 13:23

## 2024-03-07 RX ADMIN — ASPIRIN 81 MG CHEWABLE TABLET 81 MG: 81 TABLET CHEWABLE at 09:13

## 2024-03-07 RX ADMIN — APIXABAN 10 MG: 5 TABLET, FILM COATED ORAL at 09:13

## 2024-03-07 RX ADMIN — SEVELAMER CARBONATE 800 MG: 800 TABLET, FILM COATED ORAL at 09:13

## 2024-03-07 RX ADMIN — HYDROMORPHONE HYDROCHLORIDE 0.5 MG: 1 INJECTION, SOLUTION INTRAMUSCULAR; INTRAVENOUS; SUBCUTANEOUS at 04:49

## 2024-03-07 RX ADMIN — SEVELAMER CARBONATE 800 MG: 800 TABLET, FILM COATED ORAL at 17:35

## 2024-03-07 RX ADMIN — SEVELAMER CARBONATE 800 MG: 800 TABLET, FILM COATED ORAL at 13:02

## 2024-03-07 RX ADMIN — CYANOCOBALAMIN TAB 1000 MCG 1000 MCG: 1000 TAB at 09:13

## 2024-03-07 RX ADMIN — FAMOTIDINE 20 MG: 20 TABLET ORAL at 09:13

## 2024-03-07 RX ADMIN — ATORVASTATIN CALCIUM 80 MG: 40 TABLET, FILM COATED ORAL at 21:13

## 2024-03-07 RX ADMIN — HYDROMORPHONE HYDROCHLORIDE 0.5 MG: 1 INJECTION, SOLUTION INTRAMUSCULAR; INTRAVENOUS; SUBCUTANEOUS at 17:35

## 2024-03-07 RX ADMIN — SODIUM CHLORIDE, PRESERVATIVE FREE 10 ML: 5 INJECTION INTRAVENOUS at 09:16

## 2024-03-07 RX ADMIN — HYDROMORPHONE HYDROCHLORIDE 0.5 MG: 1 INJECTION, SOLUTION INTRAMUSCULAR; INTRAVENOUS; SUBCUTANEOUS at 11:04

## 2024-03-07 ASSESSMENT — PAIN DESCRIPTION - DESCRIPTORS
DESCRIPTORS: ACHING

## 2024-03-07 ASSESSMENT — PAIN DESCRIPTION - LOCATION
LOCATION: LEG;NECK
LOCATION: GENERALIZED
LOCATION: HEAD

## 2024-03-07 ASSESSMENT — PAIN SCALES - GENERAL
PAINLEVEL_OUTOF10: 10
PAINLEVEL_OUTOF10: 0
PAINLEVEL_OUTOF10: 10
PAINLEVEL_OUTOF10: 0
PAINLEVEL_OUTOF10: 7
PAINLEVEL_OUTOF10: 10
PAINLEVEL_OUTOF10: 0

## 2024-03-07 ASSESSMENT — PAIN SCALES - WONG BAKER
WONGBAKER_NUMERICALRESPONSE: 0
WONGBAKER_NUMERICALRESPONSE: 10

## 2024-03-07 ASSESSMENT — PAIN DESCRIPTION - ORIENTATION: ORIENTATION: MID

## 2024-03-07 NOTE — PROGRESS NOTES
Patient off floor for dialysis treatment; VSS; no s/s of distress; pt voices no complaints and denies pain; pt has iron sucrose (VENOFER) 300 mg in sodium chloride 0.9 % 250 mL IVPB running at 177ml/hr.

## 2024-03-07 NOTE — PROGRESS NOTES
RENAL DAILY PROGRESS NOTE    Patient: Christy Donahue               Sex: female          DOA: 3/1/2024  8:06 PM        YOB: 1950      Age:  73 y.o.        LOS:  LOS: 6 days     Subjective:     Christy Donahue is a 73 y.o.  who presents with Normocytic anemia [D64.9]  Essential hypertension [I10]  NSTEMI (non-ST elevated myocardial infarction) (HCC) [I21.4]  Elevated brain natriuretic peptide (BNP) level [R79.89]  On anticoagulant therapy [Z79.01]  Acute decompensated heart failure (HCC) [I50.9]  Acute kidney injury superimposed on CKD (HCC) [N17.9, N18.9]  Deep vein thrombosis (DVT) of proximal lower extremity, unspecified chronicity, unspecified laterality (HCC) [I82.4Y9]  Acute on chronic congestive heart failure, unspecified heart failure type (HCC) [I50.9].   73 y o  female with hx of crf stage 4,hx of chf,hx of copd on home oxygen .used to be on dialysis.has access left arm  Chief complains: Patient denies nausea, vomiting,   - Reviewed last 24 hrs events     Current Facility-Administered Medications   Medication Dose Route Frequency    iron sucrose (VENOFER) 300 mg in sodium chloride 0.9 % 250 mL IVPB  300 mg IntraVENous Once    sevelamer (RENVELA) tablet 800 mg  800 mg Oral TID WC    HYDROmorphone (DILAUDID) injection 0.5 mg  0.5 mg IntraVENous Q6H PRN    allopurinol (ZYLOPRIM) tablet 100 mg  100 mg Oral Q MWF    apixaban (ELIQUIS) tablet 10 mg  10 mg Oral BID    Followed by    [START ON 3/12/2024] apixaban (ELIQUIS) tablet 5 mg  5 mg Oral BID    epoetin agnes-epbx (RETACRIT) 5,000 Units combo injection  5,000 Units SubCUTAneous Once per day on Mon Wed Fri    ipratropium 0.5 mg-albuterol 2.5 mg (DUONEB) nebulizer solution 1 Dose  1 Dose Inhalation Q6H PRN    glucose chewable tablet 16 g  4 tablet Oral PRN    dextrose bolus 10% 125 mL  125 mL IntraVENous PRN    Or    dextrose bolus 10% 250 mL  250 mL IntraVENous PRN    glucagon (rDNA) injection 1 mg  1 mg

## 2024-03-07 NOTE — CARE COORDINATION
03/07/24 1805   IMM Letter   IMM Letter given to Patient/Family/Significant other/Guardian/POA/by: Haily Kelsey   IMM Letter date given: 03/07/24   IMM Letter time given: 1755

## 2024-03-07 NOTE — PLAN OF CARE
Problem: Discharge Planning  Goal: Discharge to home or other facility with appropriate resources  Outcome: Progressing  Flowsheets (Taken 3/7/2024 0800)  Discharge to home or other facility with appropriate resources: Identify barriers to discharge with patient and caregiver     Problem: Pain  Goal: Verbalizes/displays adequate comfort level or baseline comfort level  Outcome: Not Progressing     Problem: Safety - Adult  Goal: Free from fall injury  Outcome: Progressing     Problem: Pain  Goal: Verbalizes/displays adequate comfort level or baseline comfort level  Outcome: Not Progressing

## 2024-03-07 NOTE — DIALYSIS
Treatment summary  Received report from Hernan ZAIDI Rn  Arrived the unit in stable condition, a/o x4, LUE AVF accessed  Without complication using 17G needle, Treatment initiated.    Dialyzed patient in the suit  for 2:10 hours.     Total Uf 1214  ml  Net  ml     Treatment note:           Treatment terminated 20 min left to go per patient request. Dr. Márquez notified, Ok to end treatment.  Tylenol 650 mg given for headach      Offered assistance with repositioning every 2 hours.    Vascular access visible at all times and line connected at all   times during treatment. Access LUE AVF Functioning well  De accessed without complications.       Report given to Hernan ZAIDI RN with all questions answered.

## 2024-03-07 NOTE — CARE COORDINATION
Dr. Olivares let CM know that patient is a possible discharge tomorrow after Dialysis.   Dialysis Center/Chair Time  [facility]  M/W/F  Chair Time is 11:30 am.     CM updated AVS with Dialysis Center and Chair Time information, to start Dialysis on Monday at [facility].             Haily Kelsey RN  Case Management [phone]

## 2024-03-07 NOTE — CARE COORDINATION
CM spoke with patient, updated that patient is a possible discharge for tomorrow after Dialysis.   CM updated patient with Dialysis Chair and Center time @ [facility], M/W/F, Chair Time is 11:30 am, and start Dialysis at Center on Monday.   Patient's  to transport patient to and from Dialysis.   Patient has back Medical Transport available with Medicaid if needed.   Patient gave preference for [facility], [facility] verbal consent given for [facility].   Patient's  to transport patient home when medically stable for discharge.         CM will need Home Health orders when possible.           Haily Kelsey, RN  Case Management [phone]

## 2024-03-07 NOTE — PROGRESS NOTES
[facility] Hospitalist Group  Progress Note    Patient: Christy Donahue Age: 73 y.o. : 1950 MR#: [id number] SSN: [id number]  Date/Time: 3/7/2024     Subjective:   Patient stable. Received dialysis and numbers improved. Still has generalized body pain, will check a viral panel. Dialysis chair obtained, likely dc tomorrow.    Review of systems  General: No fevers or chills.  Cardiovascular: No chest pain or pressure. No palpitations.   Pulmonary: No shortness of breath, cough or wheeze.   Gastrointestinal: No abdominal pain, nausea, vomiting or diarrhea.   Genitourinary: No urinary frequency, urgency, hesitancy or dysuria.   Musculoskeletal: No joint or muscle pain, no back pain, no recent trauma.    Neurologic: No headache, numbness, tingling or weakness.   Assessment/Plan:   Acute decompensated heart failure -diuresis as per nephrology, strict I's and O's.  Net output -1.4 L Last echo with EF of 40 to 45%  CKD stage IV previously on hemodialysis - Dr. Márquez following and appreciated. Patient agreeable to dialysis, Uremia likely driving body pain, will check viral panel as well. Dialysis chair obtained outpatient.  Acute on chronic hypoxic respiratory failure - improved and currently on room air.  Hyperkalemia - In setting of #2, lokelma  Acute on chronic anemia -no evidence of acute bleeding concerns, in setting of kidney disease, no evidence of active bleeding.  Acute nonocclusive DVT in the left popliteal and posterior tibial veins, chronic nonocclusive DVT in the left peroneal vein - resume eliquis  Type 2 diabetes with hyperglycemia -continue low-dose Lantus and sliding scale, last A1c 5.0 as of 2024  Chronic pain - cont neurontin (monitor closely r/t #2), tramadol PRN.  Patient requesting stronger pain medication for multiple pain concerns.  added dilaudid for breakthrough pain, will wean as able.  ESBL - + intraoperative abdominal fluid cultures positive for ESBL in January  Daily       Labs:    Recent Results (from the past 24 hour(s))   POCT Glucose    Collection Time: 03/06/24  9:06 PM   Result Value Ref Range    POC Glucose 198 (H) 70 - 110 mg/dL   CBC    Collection Time: 03/07/24  5:00 AM   Result Value Ref Range    WBC 13.8 (H) 4.6 - 13.2 K/uL    RBC 3.37 (L) 4.20 - 5.30 M/uL    Hemoglobin 10.1 (L) 12.0 - 16.0 g/dL    Hematocrit 30.9 (L) 35.0 - 45.0 %    MCV 91.7 78.0 - 100.0 FL    MCH 30.0 24.0 - 34.0 PG    MCHC 32.7 31.0 - 37.0 g/dL    RDW 16.2 (H) 11.6 - 14.5 %    Platelets 327 135 - 420 K/uL    MPV 9.9 9.2 - 11.8 FL    Nucleated RBCs 1.6 (H) 0  WBC    nRBC 0.22 (H) 0.00 - 0.01 K/uL   Comprehensive Metabolic Panel    Collection Time: 03/07/24  5:00 AM   Result Value Ref Range    Sodium 136 136 - 145 mmol/L    Potassium 5.2 3.5 - 5.5 mmol/L    Chloride 100 100 - 111 mmol/L    CO2 28 21 - 32 mmol/L    Anion Gap 8 3.0 - 18 mmol/L    Glucose 145 (H) 74 - 99 mg/dL    BUN 94 (H) 7.0 - 18 MG/DL    Creatinine 2.91 (H) 0.6 - 1.3 MG/DL    Bun/Cre Ratio 32 (H) 12 - 20      Est, Glom Filt Rate 17 (L) >60 ml/min/1.73m2    Calcium 8.5 8.5 - 10.1 MG/DL    Total Bilirubin 0.7 0.2 - 1.0 MG/DL    ALT 15 13 - 56 U/L    AST 11 10 - 38 U/L    Alk Phosphatase 132 (H) 45 - 117 U/L    Total Protein 6.2 (L) 6.4 - 8.2 g/dL    Albumin 3.7 3.4 - 5.0 g/dL    Globulin 2.5 2.0 - 4.0 g/dL    Albumin/Globulin Ratio 1.5 0.8 - 1.7     POCT Glucose    Collection Time: 03/07/24  6:57 AM   Result Value Ref Range    POC Glucose 157 (H) 70 - 110 mg/dL   POCT Glucose    Collection Time: 03/07/24 10:54 AM   Result Value Ref Range    POC Glucose 138 (H) 70 - 110 mg/dL       Signed By: Aldo Olivares DO     March 7, 2024      Disclaimer: Sections of this note are dictated using utilizing voice recognition software.  Minor typographical errors may be present. If questions arise, please do not hesitate to contact me or call our department.

## 2024-03-08 ENCOUNTER — APPOINTMENT (OUTPATIENT)
Facility: HOSPITAL | Age: 74
DRG: 280 | End: 2024-03-08
Payer: MEDICARE

## 2024-03-08 PROBLEM — M79.10 MYALGIA: Status: ACTIVE | Noted: 2024-03-08

## 2024-03-08 LAB
GLUCOSE BLD STRIP.AUTO-MCNC: 153 MG/DL (ref 70–110)
GLUCOSE BLD STRIP.AUTO-MCNC: 157 MG/DL (ref 70–110)
GLUCOSE BLD STRIP.AUTO-MCNC: 180 MG/DL (ref 70–110)
GLUCOSE BLD STRIP.AUTO-MCNC: 183 MG/DL (ref 70–110)
HBV CORE AB SERPL QL IA: NEGATIVE
HBV SURFACE AB SER QL IA: POSITIVE
HBV SURFACE AB SERPL IA-ACNC: 486.52 MIU/ML
HEP BS AB COMMENT: NORMAL

## 2024-03-08 PROCEDURE — 1100000003 HC PRIVATE W/ TELEMETRY

## 2024-03-08 PROCEDURE — 6370000000 HC RX 637 (ALT 250 FOR IP): Performed by: STUDENT IN AN ORGANIZED HEALTH CARE EDUCATION/TRAINING PROGRAM

## 2024-03-08 PROCEDURE — 6370000000 HC RX 637 (ALT 250 FOR IP): Performed by: HOSPITALIST

## 2024-03-08 PROCEDURE — 90935 HEMODIALYSIS ONE EVALUATION: CPT

## 2024-03-08 PROCEDURE — 6360000002 HC RX W HCPCS: Performed by: INTERNAL MEDICINE

## 2024-03-08 PROCEDURE — 6370000000 HC RX 637 (ALT 250 FOR IP): Performed by: INTERNAL MEDICINE

## 2024-03-08 PROCEDURE — 74176 CT ABD & PELVIS W/O CONTRAST: CPT

## 2024-03-08 PROCEDURE — 2500000003 HC RX 250 WO HCPCS: Performed by: INTERNAL MEDICINE

## 2024-03-08 PROCEDURE — 99232 SBSQ HOSP IP/OBS MODERATE 35: CPT | Performed by: INTERNAL MEDICINE

## 2024-03-08 PROCEDURE — 82962 GLUCOSE BLOOD TEST: CPT

## 2024-03-08 PROCEDURE — 2580000003 HC RX 258: Performed by: STUDENT IN AN ORGANIZED HEALTH CARE EDUCATION/TRAINING PROGRAM

## 2024-03-08 RX ORDER — ALLOPURINOL 100 MG/1
100 TABLET ORAL
Qty: 30 TABLET | Refills: 1 | Status: SHIPPED | OUTPATIENT
Start: 2024-03-08

## 2024-03-08 RX ORDER — METOPROLOL TARTRATE 1 MG/ML
2.5 INJECTION, SOLUTION INTRAVENOUS ONCE
Status: COMPLETED | OUTPATIENT
Start: 2024-03-08 | End: 2024-03-08

## 2024-03-08 RX ORDER — METOPROLOL SUCCINATE 100 MG/1
100 TABLET, EXTENDED RELEASE ORAL DAILY
Status: DISCONTINUED | OUTPATIENT
Start: 2024-03-09 | End: 2024-03-09

## 2024-03-08 RX ORDER — METOPROLOL SUCCINATE 50 MG/1
50 TABLET, EXTENDED RELEASE ORAL DAILY
Qty: 30 TABLET | Refills: 3 | Status: SHIPPED | OUTPATIENT
Start: 2024-03-09

## 2024-03-08 RX ORDER — TRAMADOL HYDROCHLORIDE 50 MG/1
25 TABLET ORAL EVERY 8 HOURS PRN
Qty: 8 TABLET | Refills: 0 | Status: SHIPPED | OUTPATIENT
Start: 2024-03-08 | End: 2024-03-13

## 2024-03-08 RX ORDER — SEVELAMER CARBONATE 800 MG/1
800 TABLET, FILM COATED ORAL
Qty: 90 TABLET | Refills: 3 | Status: SHIPPED | OUTPATIENT
Start: 2024-03-08

## 2024-03-08 RX ADMIN — TRAMADOL HYDROCHLORIDE 25 MG: 50 TABLET ORAL at 08:12

## 2024-03-08 RX ADMIN — CYANOCOBALAMIN TAB 1000 MCG 1000 MCG: 1000 TAB at 08:11

## 2024-03-08 RX ADMIN — SODIUM CHLORIDE, PRESERVATIVE FREE 10 ML: 5 INJECTION INTRAVENOUS at 20:47

## 2024-03-08 RX ADMIN — SEVELAMER CARBONATE 800 MG: 800 TABLET, FILM COATED ORAL at 16:33

## 2024-03-08 RX ADMIN — HYDROMORPHONE HYDROCHLORIDE 0.5 MG: 1 INJECTION, SOLUTION INTRAMUSCULAR; INTRAVENOUS; SUBCUTANEOUS at 13:44

## 2024-03-08 RX ADMIN — METOPROLOL SUCCINATE 50 MG: 50 TABLET, EXTENDED RELEASE ORAL at 13:59

## 2024-03-08 RX ADMIN — APIXABAN 10 MG: 5 TABLET, FILM COATED ORAL at 08:11

## 2024-03-08 RX ADMIN — ATORVASTATIN CALCIUM 80 MG: 40 TABLET, FILM COATED ORAL at 20:46

## 2024-03-08 RX ADMIN — FOLIC ACID 1 MG: 1 TABLET ORAL at 08:11

## 2024-03-08 RX ADMIN — METOPROLOL TARTRATE 2.5 MG: 5 INJECTION INTRAVENOUS at 16:05

## 2024-03-08 RX ADMIN — SODIUM CHLORIDE, PRESERVATIVE FREE 10 ML: 5 INJECTION INTRAVENOUS at 08:16

## 2024-03-08 RX ADMIN — APIXABAN 10 MG: 5 TABLET, FILM COATED ORAL at 20:46

## 2024-03-08 RX ADMIN — EPOETIN ALFA-EPBX 5000 UNITS: 3000 INJECTION, SOLUTION INTRAVENOUS; SUBCUTANEOUS at 20:45

## 2024-03-08 RX ADMIN — GABAPENTIN 400 MG: 400 CAPSULE ORAL at 17:16

## 2024-03-08 RX ADMIN — SEVELAMER CARBONATE 800 MG: 800 TABLET, FILM COATED ORAL at 08:11

## 2024-03-08 RX ADMIN — TRAMADOL HYDROCHLORIDE 25 MG: 50 TABLET ORAL at 20:46

## 2024-03-08 RX ADMIN — ASPIRIN 81 MG CHEWABLE TABLET 81 MG: 81 TABLET CHEWABLE at 08:11

## 2024-03-08 RX ADMIN — FAMOTIDINE 20 MG: 20 TABLET ORAL at 08:11

## 2024-03-08 RX ADMIN — ALLOPURINOL 100 MG: 100 TABLET ORAL at 08:15

## 2024-03-08 RX ADMIN — INSULIN GLARGINE 4 UNITS: 100 INJECTION, SOLUTION SUBCUTANEOUS at 20:47

## 2024-03-08 ASSESSMENT — PAIN SCALES - GENERAL
PAINLEVEL_OUTOF10: 0
PAINLEVEL_OUTOF10: 8
PAINLEVEL_OUTOF10: 10
PAINLEVEL_OUTOF10: 10
PAINLEVEL_OUTOF10: 0
PAINLEVEL_OUTOF10: 5
PAINLEVEL_OUTOF10: 7
PAINLEVEL_OUTOF10: 5
PAINLEVEL_OUTOF10: 0
PAINLEVEL_OUTOF10: 0
PAINLEVEL_OUTOF10: 8
PAINLEVEL_OUTOF10: 0

## 2024-03-08 ASSESSMENT — PAIN DESCRIPTION - ORIENTATION
ORIENTATION: RIGHT;LEFT
ORIENTATION: MID
ORIENTATION: RIGHT;LEFT;MID
ORIENTATION: ANTERIOR;POSTERIOR

## 2024-03-08 ASSESSMENT — PAIN DESCRIPTION - ONSET
ONSET: GRADUAL
ONSET: ON-GOING

## 2024-03-08 ASSESSMENT — PAIN DESCRIPTION - LOCATION
LOCATION: ABDOMEN
LOCATION: ABDOMEN;BACK
LOCATION: LEG;ABDOMEN;BACK
LOCATION: ABDOMEN

## 2024-03-08 ASSESSMENT — PAIN DESCRIPTION - DESCRIPTORS
DESCRIPTORS: ACHING

## 2024-03-08 ASSESSMENT — PAIN DESCRIPTION - FREQUENCY: FREQUENCY: INTERMITTENT

## 2024-03-08 NOTE — PLAN OF CARE
INTERVENTION:  HEMODYNAMIC STABILIZATION  MAINTAIN BP WNL WHILE ON HD.    INTERVENTION:  FLUID MANAGEMENT  WILL ATTEMPT 1500 ML TOTAL FLUID REMOVAL AS TOLERATED.    INTERVENTION:  METABOLIC/ELECTROLYTE MANAGEMENT  2.0 POTASSIUM 2.5 CALCIUM DIALYSATE USED WITH HD TODAY.    INTERVENTION:  HEMODIALYSIS ACCESS SITE MANAGEMENT  LEFT UPPER ARM AVF ACCESSED WITH 17G NEEDLE USING ASEPTIC TECHNIQUE.    GOAL:  SIGNS AND SYMPTOMS OF LISTED POTENTIAL PROBLEMS WILL BE ABSENT OR MANAGEABLE.    OUTCOME:  PROGRESSING.    HD PLANNED FOR 3 HOURS TODAY.      Problem: Chronic Conditions and Co-morbidities  Goal: Patient's chronic conditions and co-morbidity symptoms are monitored and maintained or improved  Outcome: Progressing  Flowsheets (Taken 3/8/2024 1686)  Care Plan - Patient's Chronic Conditions and Co-Morbidity Symptoms are Monitored and Maintained or Improved:   Monitor and assess patient's chronic conditions and comorbid symptoms for stability, deterioration, or improvement   Collaborate with multidisciplinary team to address chronic and comorbid conditions and prevent exacerbation or deterioration   Update acute care plan with appropriate goals if chronic or comorbid symptoms are exacerbated and prevent overall improvement and discharge

## 2024-03-08 NOTE — CARE COORDINATION
LATASHA Brown Served Dr Olivares for Home Health orders for patient when possible.             Haily Kelsey, RN  Case Management [phone]

## 2024-03-08 NOTE — CARE COORDINATION
Home Health orders in.       LATASHA spoke with Rima with [facility], confirmed patient is Active with them, and that  does not need to put patient in the queue for [facility].           Haily Kelsey RN  Case Management [phone]

## 2024-03-08 NOTE — CARE COORDINATION
Tena RN let CM know that patient requesting Portable Oxygen for home.         CM spoke with patient at bedside, patient confirmed that she has a Home Oxygen Concentrator at home, but is requesting a small portable Oxygen bag to go with her to her appointments.   Patient said she has called [facility] many times, said she has left many voicemails, but patient said she has not ever received a return phone call from [facility].   CM let patient know that CM will email Lopez Quick, the hospital liason at [facility] and ask them to follow up with patient for Home Portable Oxygen needs.         CM emailed Lopez Quick with [facility] via [employer], and updated that patient is discharging today, and already has Home Oxygen Concentrator with them, and is requesting Portable Oxygen at home, and that patient has said that she has called [facility] many times, has left a message, and that patient has not received a call back.       Lopez Quick emailed CM back, said patient was given a Home Fill Oxygen unit on 01/17/2023, asked CM to confirm phone numbers for patient, asked for Portable Oxygen order in [facility] for an Oxygen Recert, and that they have not been able to bill this patient's Oxygen due to the need for updated notes referencing Oxygen use.   Lopez said that they have made many attempts to reach patient about her Oxygen and have only been able to leave voicemails.   Lopez asked that CM provide patient with a Portable Oxygen tank to discharge home with, and that Lopez will reach out to the team to arrange tanks for patient to use at home. ,        LATASHA Perfect Served Dr Olivares for a Home Portable Oxygen tank order.       CM went to  [employer] and got a Home Portable Oxygen Tank for patient.       CM called and spoke with Lopez Quick, to see exactly how to order Portable Home Oxygen Tank for patient in [facility]., with Recert.   Lopez assisted CM with  ordering in [facility].       Home Portable Oxygen Tank given to patient, and patient signed Proof of Delivery form from [facility] [employer].       CM faxed Proof of Delivery form for Home Portable Oxygen with facesheet to [facility] to [phone].       CM placed Proof of Delivery form for Portable Home Oxygen with [facility] in patient's chart.               Haily Kelsey RN  Case Management [phone]

## 2024-03-08 NOTE — CARE COORDINATION
Discharge order noted for today. Pt has been accepted to [facility]. Met with patient yesterday and is agreeable to the transition plan today. Transport has been arranged through patient's . Patient's home health  orders have been forwarded to [facility] via Epic.  Discharge information has been documented on the AVS.           Haily Kelsey RN  Case Management [phone]

## 2024-03-08 NOTE — PROGRESS NOTES
Received pre HD report from  HEMAALTHA Cintron RN.        Pt in bed, A+O x4, no s/s of distress noted.      Accessed AVF Left upper arm w/17G needle w/o difficulty.    Tx initiated at 0903.      AVF flowing with ease.  For hemodynamic stability UF goal 1500 ml.      Offered assistance with repositioning every 2 hours.  Vascular access visible at all times throughout entire duration of treatment and line connections remain intact throughout entire duration of treatment.     Tx completed at 1204, tolerated well 1L removed.  De-accessed per protocol.    Clot time 5 minutes for arterial, and 5 minutes for venous.      Post HD patient sent to Radiology for CT exam.  Unit nurse HEMALATHA Gonzalez RN given report.

## 2024-03-08 NOTE — PROGRESS NOTES
Patient complaining of dizziness when standing up. Orthostatic vital done, see flowsheet. Dr. Olivares notified. Discharge today on hold due to patient orthostatic vitals. Patient notified she will be be staying the night.

## 2024-03-08 NOTE — PROGRESS NOTES
Tele notified this nurse that patient heart rate was 150 at 1522. Patient was up using restroom. Patient on side of bed, heart rate 111. Dr. Olivares notified. Patient given  Metoprolol at 1400. He states to wait an hour and see how she is doing before discharging.

## 2024-03-08 NOTE — PROGRESS NOTES
Comprehensive Nutrition Assessment    Type and Reason for Visit:  Initial, RD Nutrition Re-Screen/LOS    Nutrition Recommendations/Plan:   Add supplement: Nepro BID (420 kcal, 19 gm protein each)  Continue all other nutrition interventions. Encourage/ monitor po intake of meals and supplements.      Malnutrition Assessment:  Malnutrition Status:  At risk for malnutrition (Comment) (pt on HD) (24 8157)    Context:  Acute Illness       Nutrition History and Allergies:   Past medical hx: Anemia, chronic heart failure, CKD, COPD, DM, gout, dyslipidemia, heart attack, HTN, hypercholesteremia, obesity, osteoarthritis of both knees, vitamin D deficiency.  Wt trends PTA per chart hx:  195 lb on 23,   188 lb upon admission;  wt loss of 7 lb, 3.6% x 3 months PTA.   Pt was 201- 205 lb during summer 2023 per chart hx.  No known food allergies     Nutrition Assessment:    Pt off unit at time of visit; admitted with NSTEMI, has acute decompensated heart failure exacerbation, COPD exacerbation. Pt with impaired renal function; on HD. Last treatment on 3/8, 1 L removed. Is on po diet. Unable to assess po intake.    Nutrition Related Findings:    BM 3/4.   +edema.   Recent POC B- 215 mg/dL x 24 hours.   Pertinent meds: lipitor, pepcid, folic acid, lantus, SSI, renvela, vitamin B12, pain medication prn. Wound Type: None       Current Nutrition Intake & Therapies:    Average Meal Intake: Unable to assess  Average Supplements Intake: None Ordered  ADULT DIET; Regular; 4 carb choices (60 gm/meal); Low Potassium (Less than 3000 mg/day)    Anthropometric Measures:  Height: 162.6 cm (5' 4\")  Ideal Body Weight (IBW): 120 lbs (55 kg)    Admission Body Weight: 85.3 kg (188 lb)  Current Body Weight: 85.7 kg (189 lb), 157.5 % IBW.    Current BMI (kg/m2): 32.4  Usual Body Weight: 88.5 kg (195 lb)  % Weight Change (Calculated): -3.1  Weight Adjustment For: No Adjustment  BMI Categories: Obese Class 1 (BMI 30.0-34.9)    Estimated  Daily Nutrient Needs:  Energy Requirements Based On: Kcal/kg (wt x25-28;  pt with heart failure and on HD)  Weight Used for Energy Requirements: Current  Energy (kcal/day): 9071-7021  Weight Used for Protein Requirements: Current  Protein (g/day):  (wt x1.1-1.3;  pt with heart failure and on HD)  Method Used for Fluid Requirements: Standard Renal  Fluid (ml/day): 500-1000 + total output (pt on HD)    Nutrition Diagnosis:   Increased nutrient needs related to increase demand for energy/nutrients, renal dysfunction as evidenced by dialysis    Nutrition Interventions:   Food and/or Nutrient Delivery: Continue Current Diet, Vitamin Supplement, Start Oral Nutrition Supplement  Nutrition Education/Counseling: No recommendation at this time  Coordination of Nutrition Care: Continue to monitor while inpatient       Goals:     Goals: Meet at least 75% of estimated needs, by next RD assessment       Nutrition Monitoring and Evaluation:   Behavioral-Environmental Outcomes: None Identified  Food/Nutrient Intake Outcomes: Diet Advancement/Tolerance, Food and Nutrient Intake, Supplement Intake, Vitamin/Mineral Intake  Physical Signs/Symptoms Outcomes: Biochemical Data, GI Status, Fluid Status or Edema, Hemodynamic Status, Weight, Meal Time Behavior    Discharge Planning:    Too soon to determine     Nisha Penaloza RD  Contact: [phone]

## 2024-03-08 NOTE — DISCHARGE INSTRUCTIONS
Hello you came in breathing issues and volume overload. Please attend dialysis and follow up with Dr. Fowler take care.  DISCHARGE SUMMARY from Nurse    PATIENT INSTRUCTIONS:    After general anesthesia or intravenous sedation, for 24 hours or while taking prescription Narcotics:  Limit your activities  Do not drive and operate hazardous machinery  Do not make important personal or business decisions  Do  not drink alcoholic beverages  If you have not urinated within 8 hours after discharge, please contact your surgeon on call.    Report the following to your surgeon:  Excessive pain, swelling, redness or odor of or around the surgical area  Temperature over 100.5  Nausea and vomiting lasting longer than 4 hours or if unable to take medications  Any signs of decreased circulation or nerve impairment to extremity: change in color, persistent  numbness, tingling, coldness or increase pain  Any questions    What to do at Home:  Recommended activity: activity as tolerated,     If you experience any of the following symptoms chest pain, shortness of breath, swelling in extremities, pain unrelieved by medication, return to ER if needed, please follow up with Dr. Malcolm Villasenor in 1 week.    *  Please give a list of your current medications to your Primary Care Provider.    *  Please update this list whenever your medications are discontinued, doses are      changed, or new medications (including over-the-counter products) are added.    *  Please carry medication information at all times in case of emergency situations.    These are general instructions for a healthy lifestyle:    No smoking/ No tobacco products/ Avoid exposure to second hand smoke  Surgeon General's Warning:  Quitting smoking now greatly reduces serious risk to your health.    Obesity, smoking, and sedentary lifestyle greatly increases your risk for illness    A healthy diet, regular physical exercise & weight monitoring are important for maintaining a

## 2024-03-08 NOTE — HOME CARE
Patient is Active with [facility]; Received new [facility] orders for SN,PT,OT ;Discharge order noted for today; spoke to patient's spouse over the phone, Re-verified demographics,explained HH services ,answered all questions ;  Patient's spouse states patient  has DME: home O2, Nebulizer,RW and Rollator ; Patient takes HD at [facility], M/W/F, Chair Time is 11:30 am, HH referral updated and processed to [facility] central Intake and scheduling for SANG.ANNE OLIVIER.

## 2024-03-08 NOTE — PROGRESS NOTES
RENAL DAILY PROGRESS NOTE    Subjective:       Complaint:   Overnight events noted  C/o pain in abdomen and  legs  Ct abdomen negative today  on 3/8/2024, I saw and examined patient during hemodialysis treatment. The patient was receiving hemodialysis for treatment of  renal failure. I have also reviewed vital signs, intake and output, lab results and recent events, and agreed with today's dialysis order.     IMPRESSION:   ESRD on Hd now  Chronic pain  CKD stage 5 to ESRD due to hypertension,diabetes,CHF.  Hx DVT  Hx Gi bleed  Copd on chronic home oxygen  Secondary hyperparathyroidism  Access: AV fistula     PLAN:    Hd to continue  Ok for discharge from renal standpoint  Has OP dialysis chair at [facility] 11:30 am           Current Facility-Administered Medications   Medication Dose Route Frequency    sevelamer (RENVELA) tablet 800 mg  800 mg Oral TID WC    HYDROmorphone (DILAUDID) injection 0.5 mg  0.5 mg IntraVENous Q6H PRN    allopurinol (ZYLOPRIM) tablet 100 mg  100 mg Oral Q F    apixaban (ELIQUIS) tablet 10 mg  10 mg Oral BID    Followed by    [START ON 3/12/2024] apixaban (ELIQUIS) tablet 5 mg  5 mg Oral BID    epoetin agnes-epbx (RETACRIT) 5,000 Units combo injection  5,000 Units SubCUTAneous Once per day on Mon Wed Fri    ipratropium 0.5 mg-albuterol 2.5 mg (DUONEB) nebulizer solution 1 Dose  1 Dose Inhalation Q6H PRN    glucose chewable tablet 16 g  4 tablet Oral PRN    dextrose bolus 10% 125 mL  125 mL IntraVENous PRN    Or    dextrose bolus 10% 250 mL  250 mL IntraVENous PRN    glucagon (rDNA) injection 1 mg  1 mg SubCUTAneous PRN    dextrose 10 % infusion   IntraVENous Continuous PRN    insulin lispro (HUMALOG) injection vial 0-4 Units  0-4 Units SubCUTAneous TID WC    insulin lispro (HUMALOG) injection vial 0-4 Units  0-4 Units SubCUTAneous Nightly    0.9 % sodium chloride infusion   IntraVENous PRN    insulin glargine (LANTUS) injection vial 4 Units  4 Units SubCUTAneous Nightly    traMADol

## 2024-03-09 VITALS
HEART RATE: 78 BPM | WEIGHT: 189.2 LBS | OXYGEN SATURATION: 97 % | TEMPERATURE: 98.2 F | SYSTOLIC BLOOD PRESSURE: 130 MMHG | BODY MASS INDEX: 32.3 KG/M2 | RESPIRATION RATE: 18 BRPM | HEIGHT: 64 IN | DIASTOLIC BLOOD PRESSURE: 76 MMHG

## 2024-03-09 LAB
ALBUMIN SERPL-MCNC: 3.2 G/DL (ref 3.4–5)
ALBUMIN/GLOB SERPL: 1.3 (ref 0.8–1.7)
ALP SERPL-CCNC: 114 U/L (ref 45–117)
ALT SERPL-CCNC: 12 U/L (ref 13–56)
ANION GAP SERPL CALC-SCNC: 11 MMOL/L (ref 3–18)
AST SERPL-CCNC: 7 U/L (ref 10–38)
BASOPHILS # BLD: 0 K/UL (ref 0–0.1)
BASOPHILS NFR BLD: 0 % (ref 0–2)
BILIRUB SERPL-MCNC: 1 MG/DL (ref 0.2–1)
BUN SERPL-MCNC: 59 MG/DL (ref 7–18)
BUN/CREAT SERPL: 19 (ref 12–20)
CALCIUM SERPL-MCNC: 8.9 MG/DL (ref 8.5–10.1)
CHLORIDE SERPL-SCNC: 102 MMOL/L (ref 100–111)
CO2 SERPL-SCNC: 27 MMOL/L (ref 21–32)
CREAT SERPL-MCNC: 3.14 MG/DL (ref 0.6–1.3)
DIFFERENTIAL METHOD BLD: ABNORMAL
EOSINOPHIL # BLD: 0.1 K/UL (ref 0–0.4)
EOSINOPHIL NFR BLD: 0 % (ref 0–5)
ERYTHROCYTE [DISTWIDTH] IN BLOOD BY AUTOMATED COUNT: 16.2 % (ref 11.6–14.5)
GLOBULIN SER CALC-MCNC: 2.4 G/DL (ref 2–4)
GLUCOSE BLD STRIP.AUTO-MCNC: 142 MG/DL (ref 70–110)
GLUCOSE BLD STRIP.AUTO-MCNC: 151 MG/DL (ref 70–110)
GLUCOSE BLD STRIP.AUTO-MCNC: 155 MG/DL (ref 70–110)
GLUCOSE SERPL-MCNC: 154 MG/DL (ref 74–99)
HCT VFR BLD AUTO: 24.9 % (ref 35–45)
HGB BLD-MCNC: 7.9 G/DL (ref 12–16)
IMM GRANULOCYTES # BLD AUTO: 1.5 K/UL (ref 0–0.04)
IMM GRANULOCYTES NFR BLD AUTO: 0 %
LYMPHOCYTES # BLD: 1.2 K/UL (ref 0.9–3.6)
LYMPHOCYTES NFR BLD: 13 % (ref 21–52)
MCH RBC QN AUTO: 30.5 PG (ref 24–34)
MCHC RBC AUTO-ENTMCNC: 31.7 G/DL (ref 31–37)
MCV RBC AUTO: 96.1 FL (ref 78–100)
MONOCYTES # BLD: 1 K/UL (ref 0.05–1.2)
MONOCYTES NFR BLD: 11 % (ref 3–10)
NEUTS SEG # BLD: 7.4 K/UL (ref 1.8–8)
NEUTS SEG NFR BLD: 76 % (ref 40–73)
NRBC # BLD: 0.1 K/UL (ref 0–0.01)
NRBC BLD-RTO: 0.9 PER 100 WBC
PLATELET # BLD AUTO: 250 K/UL (ref 135–420)
PMV BLD AUTO: 9.8 FL (ref 9.2–11.8)
POTASSIUM SERPL-SCNC: 4.3 MMOL/L (ref 3.5–5.5)
PROT SERPL-MCNC: 5.6 G/DL (ref 6.4–8.2)
RBC # BLD AUTO: 2.6 M/UL (ref 4.2–5.3)
SODIUM SERPL-SCNC: 140 MMOL/L (ref 136–145)
WBC # BLD AUTO: 9.8 K/UL (ref 4.6–13.2)

## 2024-03-09 PROCEDURE — 6370000000 HC RX 637 (ALT 250 FOR IP): Performed by: INTERNAL MEDICINE

## 2024-03-09 PROCEDURE — 85025 COMPLETE CBC W/AUTO DIFF WBC: CPT

## 2024-03-09 PROCEDURE — 99239 HOSP IP/OBS DSCHRG MGMT >30: CPT | Performed by: INTERNAL MEDICINE

## 2024-03-09 PROCEDURE — 2580000003 HC RX 258: Performed by: STUDENT IN AN ORGANIZED HEALTH CARE EDUCATION/TRAINING PROGRAM

## 2024-03-09 PROCEDURE — 36592 COLLECT BLOOD FROM PICC: CPT

## 2024-03-09 PROCEDURE — 82962 GLUCOSE BLOOD TEST: CPT

## 2024-03-09 PROCEDURE — 80053 COMPREHEN METABOLIC PANEL: CPT

## 2024-03-09 PROCEDURE — 6370000000 HC RX 637 (ALT 250 FOR IP): Performed by: STUDENT IN AN ORGANIZED HEALTH CARE EDUCATION/TRAINING PROGRAM

## 2024-03-09 RX ORDER — METOPROLOL SUCCINATE 50 MG/1
50 TABLET, EXTENDED RELEASE ORAL DAILY
Status: DISCONTINUED | OUTPATIENT
Start: 2024-03-10 | End: 2024-03-09 | Stop reason: HOSPADM

## 2024-03-09 RX ORDER — METOPROLOL SUCCINATE 25 MG/1
25 TABLET, EXTENDED RELEASE ORAL DAILY
Status: DISCONTINUED | OUTPATIENT
Start: 2024-03-10 | End: 2024-03-09

## 2024-03-09 RX ADMIN — SEVELAMER CARBONATE 800 MG: 800 TABLET, FILM COATED ORAL at 11:52

## 2024-03-09 RX ADMIN — FOLIC ACID 1 MG: 1 TABLET ORAL at 08:27

## 2024-03-09 RX ADMIN — SODIUM CHLORIDE, PRESERVATIVE FREE 10 ML: 5 INJECTION INTRAVENOUS at 08:28

## 2024-03-09 RX ADMIN — METOPROLOL SUCCINATE 100 MG: 100 TABLET, EXTENDED RELEASE ORAL at 08:27

## 2024-03-09 RX ADMIN — APIXABAN 10 MG: 5 TABLET, FILM COATED ORAL at 08:27

## 2024-03-09 RX ADMIN — ASPIRIN 81 MG CHEWABLE TABLET 81 MG: 81 TABLET CHEWABLE at 08:27

## 2024-03-09 RX ADMIN — CYANOCOBALAMIN TAB 1000 MCG 1000 MCG: 1000 TAB at 08:27

## 2024-03-09 RX ADMIN — FAMOTIDINE 20 MG: 20 TABLET ORAL at 08:27

## 2024-03-09 RX ADMIN — SEVELAMER CARBONATE 800 MG: 800 TABLET, FILM COATED ORAL at 08:27

## 2024-03-09 ASSESSMENT — PAIN SCALES - GENERAL
PAINLEVEL_OUTOF10: 0

## 2024-03-09 NOTE — DISCHARGE SUMMARY
Hospitalist Discharge Summary     Patient ID:  Christy Donahue  [id number]  73 y.o.  1950    PCP on record: Kalli Louise APRN - NP    Admit date: 3/1/2024  Discharge date and time: 3/9/2024    Discharge Diagnoses:                                             Acute decompensated heart failure -; compensated continue HD;  Elevated troponin due to type II MI due to CHF and renal failure;    ESRD on  hemodialysis - Dr. Márquez following and appreciated. Patient agreeable to dialysis, body pain improved post dialysis,viral panel negative. Dialysis chair obtained outpatient.    Acute on chronic hypoxic respiratory failure - improved and currently on room air.    Hyperkalemia - In setting of #2, lokelma resolved;    Acute on chronic anemia -no evidence of acute bleeding concerns, in setting of kidney disease, no evidence of active bleeding.    Acute nonocclusive DVT in the left popliteal and posterior tibial veins, chronic nonocclusive DVT in the left peroneal vein - resume eliquis    Type 2 diabetes with hyperglycemia -continue low-dose Lantus and sliding scale, last A1c 5.0 as of 1/30/2024    Chronic pain - cont neurontin (monitor closely r/t #2), tramadol PRN.  Patient requesting stronger pain medication for multiple pain concerns.  recommend no escalating pain medicine other than tramadol.    ESBL - + intraoperative abdominal fluid cultures positive for ESBL in January 2023;    Fall in hospital. CT head neg x2.     Left hip pain, back pain s/p fall. Xray of hip negative           Consults: nephrology;      Hospital Course ;    Christy Donahue is a 74yo F w/ PMHx of  Obesity, HTN, HLD, T2DM, CKD4 and HFmrEF c/b recurring CHF exacerbations who presented to [facility] ED on 3/1 w/ CC of SOB.  Chest x-ray showed fluid overload.    Renal was consulted regarding their note patient was on dialysis but refused dialysis as her kidney function improved but now agreeable for dialysis again.  Patient also found to  by mouth daily     insulin detemir 100 UNIT/ML injection pen  Commonly known as: LEVEMIR     nitroGLYCERIN 0.4 MG SL tablet  Commonly known as: NITROSTAT  Place 1 tablet under the tongue every 5 minutes as needed for Chest pain up to max of 3 total doses. If no relief after 1 dose, call 911.     OXYGEN     Oxygen Concentrator     umeclidinium-vilanterol 62.5-25 MCG/ACT inhaler  Commonly known as: ANORO ELLIPTA            STOP taking these medications      bumetanide 2 MG tablet  Commonly known as: BUMEX     cyclobenzaprine 10 MG tablet  Commonly known as: FLEXERIL     gabapentin 400 MG capsule  Commonly known as: NEURONTIN     metOLazone 2.5 MG tablet  Commonly known as: ZAROXOLYN            ASK your doctor about these medications      polyethylene glycol 17 g packet  Commonly known as: GLYCOLAX  Take 1 packet by mouth daily  Ask about: Should I take this medication?               Where to Get Your Medications        These medications were sent to [address] -  [phone] - [facility] [phone]  [address] [place] [id number]      Phone: [phone]   allopurinol 100 MG tablet  apixaban 5 MG Tabs tablet  metoprolol succinate 50 MG extended release tablet  sevelamer 800 MG tablet  traMADol 50 MG tablet             My Recommended Diet, Activity, Wound Care, and follow-up labs are listed in the patient's Discharge Insturctions which I have personally completed and reviewed.      Disposition:     [x] Home with family     []  PT/RN   [] SNF/NH   [] Inpatient Rehab/JANE  Condition at Discharge:  Stable    Follow up with:   PCP : Kalli Louise APRN - NP        >30 minutes spent coordinating this discharge (review instructions/follow-up, prescriptions, preparing report for sign off)    Signed:  Vincent Doran MD  3/9/2024  12:03 PM

## 2024-03-09 NOTE — PROGRESS NOTES
RENAL DAILY PROGRESS NOTE    Subjective:       Complaint:   Overnight events noted  No complaints offered      IMPRESSION:   ESRD on Hd now  Chronic pain  CKD stage 5 to ESRD due to hypertension,diabetes,CHF.  Hx DVT  Hx Gi bleed  Copd on chronic home oxygen  Secondary hyperparathyroidism  Access: AV fistula     PLAN:    Hd to continue on [facility]  Ok for discharge from renal standpoint  Has OP dialysis chair at [facility] 11:30 am           Current Facility-Administered Medications   Medication Dose Route Frequency    apixaban (ELIQUIS) tablet 5 mg  5 mg Oral BID    [START ON 3/10/2024] metoprolol succinate (TOPROL XL) extended release tablet 50 mg  50 mg Oral Daily    sevelamer (RENVELA) tablet 800 mg  800 mg Oral TID     allopurinol (ZYLOPRIM) tablet 100 mg  100 mg Oral Q [facility]    epoetin agnes-epbx (RETACRIT) 5,000 Units combo injection  5,000 Units SubCUTAneous Once per day on Mon Wed Fri    ipratropium 0.5 mg-albuterol 2.5 mg (DUONEB) nebulizer solution 1 Dose  1 Dose Inhalation Q6H PRN    glucose chewable tablet 16 g  4 tablet Oral PRN    dextrose bolus 10% 125 mL  125 mL IntraVENous PRN    Or    dextrose bolus 10% 250 mL  250 mL IntraVENous PRN    glucagon (rDNA) injection 1 mg  1 mg SubCUTAneous PRN    dextrose 10 % infusion   IntraVENous Continuous PRN    insulin lispro (HUMALOG) injection vial 0-4 Units  0-4 Units SubCUTAneous TID     insulin lispro (HUMALOG) injection vial 0-4 Units  0-4 Units SubCUTAneous Nightly    0.9 % sodium chloride infusion   IntraVENous PRN    insulin glargine (LANTUS) injection vial 4 Units  4 Units SubCUTAneous Nightly    traMADol (ULTRAM) tablet 25 mg  25 mg Oral Q8H PRN    sodium chloride flush 0.9 % injection 5-40 mL  5-40 mL IntraVENous 2 times per day    sodium chloride flush 0.9 % injection 5-40 mL  5-40 mL IntraVENous PRN    0.9 % sodium chloride infusion   IntraVENous PRN    ondansetron (ZOFRAN-ODT) disintegrating tablet 4 mg  4 mg Oral Q8H PRN    Or    ondansetron

## 2024-03-09 NOTE — PROGRESS NOTES
[facility] Hospitalist Group  Progress Note    Patient: Christy Donahue Age: 73 y.o. : 1950 MR#: [id number] SSN: [id number]  Date/Time: 3/8/2024     Subjective:   Patient stable. Positive orthostatics after dialysis today. Patient reports dizziness post dialysis, heart rate also elevated after dialysis up to 140 but normalized. Patient was not given metoprolol in the AM today or yesterday per nursing.    Review of systems  General: No fevers or chills.  Cardiovascular: No chest pain or pressure. No palpitations.   Pulmonary: No shortness of breath, cough or wheeze.   Gastrointestinal: No abdominal pain, nausea, vomiting or diarrhea.   Genitourinary: No urinary frequency, urgency, hesitancy or dysuria.   Musculoskeletal: No joint or muscle pain, no back pain, no recent trauma.    Neurologic: No headache, numbness, tingling or weakness.   Assessment/Plan:   Acute decompensated heart failure -diuresis as per nephrology, strict I's and O's.  Net output -1.4 L Last echo with EF of 40 to 45%  CKD stage IV previously on hemodialysis - Dr. Márquez following and appreciated. Patient agreeable to dialysis, body pain improved post dialysis,viral panel negative. Dialysis chair obtained outpatient.  Acute on chronic hypoxic respiratory failure - improved and currently on room air.  Hyperkalemia - In setting of #2, lokelma  Acute on chronic anemia -no evidence of acute bleeding concerns, in setting of kidney disease, no evidence of active bleeding.  Acute nonocclusive DVT in the left popliteal and posterior tibial veins, chronic nonocclusive DVT in the left peroneal vein - resume eliquis  Type 2 diabetes with hyperglycemia -continue low-dose Lantus and sliding scale, last A1c 5.0 as of 2024  Chronic pain - cont neurontin (monitor closely r/t #2), tramadol PRN.  Patient requesting stronger pain medication for multiple pain concerns.  recommend no escalating pain medicine other than

## 2024-03-09 NOTE — PROGRESS NOTES
Patient was given discharge paperwork. Meds and appointments were gone over. Dialysis schedule gone over. [facility] and tele removed. Education paperwork given. Waiting on ride.

## 2024-03-09 NOTE — CARE COORDINATION
Discharge order noted for today. Pt has been accepted to [facility] agency. Patient agreeable to the transition plan today. Transport has been arranged through . Patient's discharge summary and home health  orders have been forwarded to  home health  agency Updated bedside RN,  to the transition plan.  Discharge information has been documented on the AVS.

## 2024-03-10 ENCOUNTER — HOME CARE VISIT (OUTPATIENT)
Age: 74
End: 2024-03-10

## 2024-03-11 NOTE — HOME CARE
Noted that patient discharged on Sat 3/9/24 ,instead of 3/8; [facility] will continue to follow patient ,HH referral updated over the weekend .ANNE OLIVIER.

## 2024-03-13 ENCOUNTER — HOME CARE VISIT (OUTPATIENT)
Age: 74
End: 2024-03-13

## 2024-03-14 ENCOUNTER — HOME CARE VISIT (OUTPATIENT)
Age: 74
End: 2024-03-14

## 2024-03-14 ENCOUNTER — APPOINTMENT (OUTPATIENT)
Facility: HOSPITAL | Age: 74
End: 2024-03-14
Payer: MEDICARE

## 2024-03-14 ENCOUNTER — HOSPITAL ENCOUNTER (EMERGENCY)
Facility: HOSPITAL | Age: 74
Discharge: HOME | End: 2024-03-14
Attending: EMERGENCY MEDICINE
Payer: MEDICARE

## 2024-03-14 VITALS
HEART RATE: 85 BPM | OXYGEN SATURATION: 100 % | HEIGHT: 64 IN | TEMPERATURE: 98.3 F | RESPIRATION RATE: 11 BRPM | DIASTOLIC BLOOD PRESSURE: 63 MMHG | WEIGHT: 189 LBS | BODY MASS INDEX: 32.27 KG/M2 | SYSTOLIC BLOOD PRESSURE: 128 MMHG

## 2024-03-14 DIAGNOSIS — N18.6 ESRD ON DIALYSIS (HCC): Primary | ICD-10-CM

## 2024-03-14 DIAGNOSIS — Z99.2 ESRD ON DIALYSIS (HCC): Primary | ICD-10-CM

## 2024-03-14 DIAGNOSIS — M25.551 RIGHT HIP PAIN: ICD-10-CM

## 2024-03-14 DIAGNOSIS — M47.817 SPONDYLOSIS OF LUMBOSACRAL REGION, UNSPECIFIED SPINAL OSTEOARTHRITIS COMPLICATION STATUS: ICD-10-CM

## 2024-03-14 LAB
ALBUMIN SERPL-MCNC: 3 G/DL (ref 3.4–5)
ALBUMIN/GLOB SERPL: 1 (ref 0.8–1.7)
ALP SERPL-CCNC: 119 U/L (ref 45–117)
ALT SERPL-CCNC: 18 U/L (ref 13–56)
ANION GAP SERPL CALC-SCNC: 12 MMOL/L (ref 3–18)
APPEARANCE UR: CLEAR
AST SERPL-CCNC: 14 U/L (ref 10–38)
BACTERIA URNS QL MICRO: ABNORMAL /HPF
BASOPHILS # BLD: 0 K/UL (ref 0–0.1)
BASOPHILS NFR BLD: 0 % (ref 0–2)
BILIRUB DIRECT SERPL-MCNC: 0.3 MG/DL (ref 0–0.2)
BILIRUB SERPL-MCNC: 1 MG/DL (ref 0.2–1)
BILIRUB UR QL: NEGATIVE
BUN SERPL-MCNC: 37 MG/DL (ref 7–18)
BUN/CREAT SERPL: 9 (ref 12–20)
CALCIUM SERPL-MCNC: 9.4 MG/DL (ref 8.5–10.1)
CHLORIDE SERPL-SCNC: 96 MMOL/L (ref 100–111)
CO2 SERPL-SCNC: 30 MMOL/L (ref 21–32)
COLOR UR: ABNORMAL
CREAT SERPL-MCNC: 4.03 MG/DL (ref 0.6–1.3)
DIFFERENTIAL METHOD BLD: ABNORMAL
EOSINOPHIL # BLD: 0 K/UL (ref 0–0.4)
EOSINOPHIL NFR BLD: 0 % (ref 0–5)
EPITH CASTS URNS QL MICRO: ABNORMAL /LPF (ref 0–5)
ERYTHROCYTE [DISTWIDTH] IN BLOOD BY AUTOMATED COUNT: 16 % (ref 11.6–14.5)
GLOBULIN SER CALC-MCNC: 3.1 G/DL (ref 2–4)
GLUCOSE SERPL-MCNC: 140 MG/DL (ref 74–99)
GLUCOSE UR STRIP.AUTO-MCNC: NEGATIVE MG/DL
HCT VFR BLD AUTO: 23.8 % (ref 35–45)
HGB BLD-MCNC: 7.4 G/DL (ref 12–16)
HGB UR QL STRIP: NEGATIVE
IMM GRANULOCYTES # BLD AUTO: 0.1 K/UL (ref 0–0.04)
IMM GRANULOCYTES NFR BLD AUTO: 1 % (ref 0–0.5)
KETONES UR QL STRIP.AUTO: ABNORMAL MG/DL
LEUKOCYTE ESTERASE UR QL STRIP.AUTO: NEGATIVE
LIPASE SERPL-CCNC: 54 U/L (ref 13–75)
LYMPHOCYTES # BLD: 1.1 K/UL (ref 0.9–3.6)
LYMPHOCYTES NFR BLD: 12 % (ref 21–52)
MCH RBC QN AUTO: 29.1 PG (ref 24–34)
MCHC RBC AUTO-ENTMCNC: 31.1 G/DL (ref 31–37)
MCV RBC AUTO: 93.7 FL (ref 78–100)
MONOCYTES # BLD: 1 K/UL (ref 0.05–1.2)
MONOCYTES NFR BLD: 11 % (ref 3–10)
NEUTS SEG # BLD: 7.3 K/UL (ref 1.8–8)
NEUTS SEG NFR BLD: 77 % (ref 40–73)
NITRITE UR QL STRIP.AUTO: NEGATIVE
NRBC # BLD: 0.06 K/UL (ref 0–0.01)
NRBC BLD-RTO: 0.6 PER 100 WBC
PH UR STRIP: 5.5 (ref 5–8)
PLATELET # BLD AUTO: 286 K/UL (ref 135–420)
PMV BLD AUTO: 9.8 FL (ref 9.2–11.8)
POTASSIUM SERPL-SCNC: 3.6 MMOL/L (ref 3.5–5.5)
PROT SERPL-MCNC: 6.1 G/DL (ref 6.4–8.2)
PROT UR STRIP-MCNC: ABNORMAL MG/DL
RBC # BLD AUTO: 2.54 M/UL (ref 4.2–5.3)
RBC #/AREA URNS HPF: ABNORMAL /HPF (ref 0–5)
SODIUM SERPL-SCNC: 138 MMOL/L (ref 136–145)
SP GR UR REFRACTOMETRY: 1.02 (ref 1–1.03)
TROPONIN I SERPL HS-MCNC: 45 NG/L (ref 0–54)
TROPONIN I SERPL HS-MCNC: 49 NG/L (ref 0–54)
UROBILINOGEN UR QL STRIP.AUTO: 1 EU/DL (ref 0.2–1)
WBC # BLD AUTO: 9.6 K/UL (ref 4.6–13.2)
WBC URNS QL MICRO: ABNORMAL /HPF (ref 0–4)

## 2024-03-14 PROCEDURE — 81001 URINALYSIS AUTO W/SCOPE: CPT

## 2024-03-14 PROCEDURE — 72100 X-RAY EXAM L-S SPINE 2/3 VWS: CPT

## 2024-03-14 PROCEDURE — 6370000000 HC RX 637 (ALT 250 FOR IP)

## 2024-03-14 PROCEDURE — 83690 ASSAY OF LIPASE: CPT

## 2024-03-14 PROCEDURE — 80048 BASIC METABOLIC PNL TOTAL CA: CPT

## 2024-03-14 PROCEDURE — 99285 EMERGENCY DEPT VISIT HI MDM: CPT

## 2024-03-14 PROCEDURE — 73700 CT LOWER EXTREMITY W/O DYE: CPT

## 2024-03-14 PROCEDURE — 6370000000 HC RX 637 (ALT 250 FOR IP): Performed by: EMERGENCY MEDICINE

## 2024-03-14 PROCEDURE — 80076 HEPATIC FUNCTION PANEL: CPT

## 2024-03-14 PROCEDURE — 84484 ASSAY OF TROPONIN QUANT: CPT

## 2024-03-14 PROCEDURE — 71045 X-RAY EXAM CHEST 1 VIEW: CPT

## 2024-03-14 PROCEDURE — 85025 COMPLETE CBC W/AUTO DIFF WBC: CPT

## 2024-03-14 PROCEDURE — 93005 ELECTROCARDIOGRAM TRACING: CPT | Performed by: EMERGENCY MEDICINE

## 2024-03-14 PROCEDURE — 73502 X-RAY EXAM HIP UNI 2-3 VIEWS: CPT

## 2024-03-14 RX ORDER — TRAMADOL HYDROCHLORIDE 50 MG/1
50 TABLET ORAL EVERY 6 HOURS PRN
Qty: 12 TABLET | Refills: 0 | Status: SHIPPED | OUTPATIENT
Start: 2024-03-14 | End: 2024-03-17

## 2024-03-14 RX ORDER — TRAMADOL HYDROCHLORIDE 50 MG/1
50 TABLET ORAL
Status: COMPLETED | OUTPATIENT
Start: 2024-03-14 | End: 2024-03-14

## 2024-03-14 RX ORDER — TRAMADOL HYDROCHLORIDE 50 MG/1
50 TABLET ORAL ONCE
Status: COMPLETED | OUTPATIENT
Start: 2024-03-14 | End: 2024-03-14

## 2024-03-14 RX ORDER — ACETAMINOPHEN 325 MG/1
650 TABLET ORAL
Status: COMPLETED | OUTPATIENT
Start: 2024-03-14 | End: 2024-03-14

## 2024-03-14 RX ADMIN — TRAMADOL HYDROCHLORIDE 50 MG: 50 TABLET ORAL at 18:08

## 2024-03-14 RX ADMIN — TRAMADOL HYDROCHLORIDE 50 MG: 50 TABLET ORAL at 15:35

## 2024-03-14 RX ADMIN — ACETAMINOPHEN 325MG 650 MG: 325 TABLET ORAL at 14:50

## 2024-03-14 ASSESSMENT — PAIN DESCRIPTION - ORIENTATION
ORIENTATION: RIGHT;LOWER

## 2024-03-14 ASSESSMENT — PAIN SCALES - GENERAL
PAINLEVEL_OUTOF10: 9
PAINLEVEL_OUTOF10: 10

## 2024-03-14 ASSESSMENT — PAIN DESCRIPTION - LOCATION
LOCATION: BACK

## 2024-03-14 ASSESSMENT — ENCOUNTER SYMPTOMS
COUGH: 0
SHORTNESS OF BREATH: 1
WHEEZING: 0
BACK PAIN: 1

## 2024-03-14 ASSESSMENT — PAIN - FUNCTIONAL ASSESSMENT: PAIN_FUNCTIONAL_ASSESSMENT: 0-10

## 2024-03-14 ASSESSMENT — PAIN DESCRIPTION - DESCRIPTORS: DESCRIPTORS: CRAMPING

## 2024-03-14 NOTE — ED PROVIDER NOTES
3:33 PM :Pt care assumed from Dr. Laboy , ED provider. Pt complaint(s), current treatment plan, progression and available diagnostic results have been discussed thoroughly. The patient was seen and evaluated on my shift.   Rounding occurred: Yes  Intended Disposition: TBD  Pending diagnostic reports and/or labs (please list): R hip low back, chest pain, after being weighed at HD         Ramiro Joseph MD  03/14/24 1314      Assumed care of the patient from the resident team and Dr. Laboy and reevaluated the patient and mostly right hip pain and ultimately had a CT scan which showed no evidence of fracture.  The patient is moving her extremities and has no neurologic findings but is been complaining of back pain since she was weighed at dialysis.  The patient is afebrile I do not suspect cord compromise or infectious etiology at this time and did have some improvement with tramadol.  Will refer the patient to the spine center, tramadol for home care, and the patient will return if at all worsened or concerned.    Workup and recommendations were reviewed with the patient and all questions were answered.  The patient understands the plan and will proceed with close outpatient care.  I have encouraged the patient to return if at all worsened or concerned.   Ramiro Joseph DO 10:45 PM       Ramiro Joseph MD  03/14/24 [id number]       Ramiro Joseph MD  03/14/24 [id number]       Ramiro Joseph MD  03/14/24 0188

## 2024-03-14 NOTE — ED TRIAGE NOTES
EMS reports Pt began to c/o low back pain, dyspnea, and Rt rib pain after dialysis yesterday. Pt states she has been here for the same in the past.

## 2024-03-14 NOTE — ED PROVIDER NOTES
EMERGENCY DEPARTMENT HISTORY AND PHYSICAL EXAM    2:32 PM      Date: 3/14/2024  Patient Name: Christy Donahue    History of Presenting Illness     Chief Complaint   Patient presents with    Back Pain    Rib Pain (injury)         History Provided By: Patient   Location/Duration/Severity/Modifying factors   HPI    Christy Donahue is a 72yo F w/ PMHx of  Obesity, HTN, HLD, T2DM, CKD4 and HFmrEF  who presented to [facility] ED on 3/14 with complaint of atraumatic lower back pain and chest pain x 1 day. Patient states that she was being weighed at dialysis (MWF) yesterday when she experienced sudden onset right-sided lower back pain and right hip pain. She says that dialysis treatment was completed. Patient follows with Dr. Fowler. She also started to experience chest pain. She describes both as constant. States that back pain worsens with movement of leg but denies any radiation of pain down right lower extremity. Patient states that she is compliant with all of her medications.  Patient states that she still makes urine.     Of note, patient recently discharged from hospital on 3/9/2024 for acute decompensated heart failure.  Patient also noted to have elevated troponin due to type II MI secondary to CHF and renal failure.  Patient complained of left hip pain on that admission.  X-ray came back significant for osteopenia.    PCP: Kalli Louise APRN - NP    No current facility-administered medications for this encounter.     Current Outpatient Medications   Medication Sig Dispense Refill    [START ON 3/20/2024] apixaban (ELIQUIS) 5 MG TABS tablet Take 1 tablet by mouth 2 times daily 60 tablet 2    allopurinol (ZYLOPRIM) 100 MG tablet Take 1 tablet by mouth three times a week 30 tablet 1    metoprolol succinate (TOPROL XL) 50 MG extended release tablet Take 1 tablet by mouth daily 30 tablet 3    sevelamer (RENVELA) 800 MG tablet Take 1 tablet by mouth 3 times daily (with meals) 90 tablet 3    OXYGEN Inhale 2 L into the lungs

## 2024-03-15 LAB
EKG ATRIAL RATE: 98 BPM
EKG DIAGNOSIS: NORMAL
EKG P AXIS: 63 DEGREES
EKG P-R INTERVAL: 164 MS
EKG Q-T INTERVAL: 378 MS
EKG QRS DURATION: 78 MS
EKG QTC CALCULATION (BAZETT): 482 MS
EKG R AXIS: 28 DEGREES
EKG T AXIS: 96 DEGREES
EKG VENTRICULAR RATE: 98 BPM

## 2024-03-15 PROCEDURE — 93010 ELECTROCARDIOGRAM REPORT: CPT | Performed by: INTERNAL MEDICINE

## 2024-03-15 NOTE — ED NOTES
Pt given discharge instructions. Pt verbalized understanding of discharge instructions. PIV removed.

## 2024-03-19 ENCOUNTER — HOSPITAL ENCOUNTER (INPATIENT)
Facility: HOSPITAL | Age: 74
LOS: 6 days | Discharge: HOME HEALTH | DRG: 377 | End: 2024-03-26
Attending: EMERGENCY MEDICINE | Admitting: INTERNAL MEDICINE
Payer: MEDICARE

## 2024-03-19 DIAGNOSIS — M79.10 MYALGIA: ICD-10-CM

## 2024-03-19 DIAGNOSIS — D50.0 ANEMIA, BLOOD LOSS: ICD-10-CM

## 2024-03-19 DIAGNOSIS — N18.6 ESRD (END STAGE RENAL DISEASE) (HCC): ICD-10-CM

## 2024-03-19 DIAGNOSIS — I82.4Y9 DEEP VEIN THROMBOSIS (DVT) OF PROXIMAL LOWER EXTREMITY, UNSPECIFIED CHRONICITY, UNSPECIFIED LATERALITY (HCC): Primary | ICD-10-CM

## 2024-03-19 DIAGNOSIS — K92.2 UGIB (UPPER GASTROINTESTINAL BLEED): ICD-10-CM

## 2024-03-19 DIAGNOSIS — I82.432 DEEP VEIN THROMBOSIS (DVT) OF POPLITEAL VEIN OF LEFT LOWER EXTREMITY, UNSPECIFIED CHRONICITY (HCC): ICD-10-CM

## 2024-03-19 PROCEDURE — 85025 COMPLETE CBC W/AUTO DIFF WBC: CPT

## 2024-03-19 PROCEDURE — 83735 ASSAY OF MAGNESIUM: CPT

## 2024-03-19 PROCEDURE — 83605 ASSAY OF LACTIC ACID: CPT

## 2024-03-19 PROCEDURE — 84145 PROCALCITONIN (PCT): CPT

## 2024-03-19 PROCEDURE — 99285 EMERGENCY DEPT VISIT HI MDM: CPT

## 2024-03-19 PROCEDURE — 85730 THROMBOPLASTIN TIME PARTIAL: CPT

## 2024-03-19 PROCEDURE — 93005 ELECTROCARDIOGRAM TRACING: CPT

## 2024-03-19 PROCEDURE — 80048 BASIC METABOLIC PNL TOTAL CA: CPT

## 2024-03-19 PROCEDURE — 85610 PROTHROMBIN TIME: CPT

## 2024-03-19 RX ORDER — LINACLOTIDE 72 UG/1
CAPSULE, GELATIN COATED ORAL
COMMUNITY
Start: 2024-02-16

## 2024-03-19 RX ORDER — BACILLUS COAGULANS/INULIN 1B-250 MG
CAPSULE ORAL
Status: ON HOLD | COMMUNITY
End: 2024-03-26 | Stop reason: HOSPADM

## 2024-03-19 RX ORDER — IPRATROPIUM BROMIDE AND ALBUTEROL SULFATE 2.5; .5 MG/3ML; MG/3ML
SOLUTION RESPIRATORY (INHALATION)
COMMUNITY
Start: 2024-02-26

## 2024-03-19 RX ORDER — PANTOPRAZOLE SODIUM 40 MG/1
40 TABLET, DELAYED RELEASE ORAL DAILY
Status: ON HOLD | COMMUNITY
Start: 2024-02-12 | End: 2024-03-26

## 2024-03-19 RX ORDER — ATORVASTATIN CALCIUM 80 MG/1
TABLET, FILM COATED ORAL
COMMUNITY
Start: 2024-03-05

## 2024-03-19 RX ORDER — FENTANYL CITRATE 50 UG/ML
25 INJECTION, SOLUTION INTRAMUSCULAR; INTRAVENOUS
Status: COMPLETED | OUTPATIENT
Start: 2024-03-20 | End: 2024-03-20

## 2024-03-19 RX ORDER — AMOXICILLIN 250 MG
1 CAPSULE ORAL 2 TIMES DAILY
COMMUNITY
Start: 2024-01-10

## 2024-03-19 RX ORDER — INSULIN GLARGINE 100 [IU]/ML
INJECTION, SOLUTION SUBCUTANEOUS
COMMUNITY
Start: 2024-01-12

## 2024-03-19 RX ORDER — EZETIMIBE 10 MG/1
10 TABLET ORAL DAILY
COMMUNITY
Start: 2024-01-05

## 2024-03-19 RX ORDER — TIZANIDINE 4 MG/1
TABLET ORAL
Status: ON HOLD | COMMUNITY
End: 2024-03-26 | Stop reason: HOSPADM

## 2024-03-19 RX ORDER — LIDOCAINE PAIN RELIEF 40 MG/1000MG
PATCH TOPICAL
Status: ON HOLD | COMMUNITY
Start: 2024-01-10 | End: 2024-03-26 | Stop reason: HOSPADM

## 2024-03-19 RX ORDER — SODIUM ZIRCONIUM CYCLOSILICATE 5 G/5G
POWDER, FOR SUSPENSION ORAL
Status: ON HOLD | COMMUNITY
Start: 2024-01-24 | End: 2024-03-26 | Stop reason: HOSPADM

## 2024-03-19 RX ORDER — IPRATROPIUM BROMIDE AND ALBUTEROL 20; 100 UG/1; UG/1
SPRAY, METERED RESPIRATORY (INHALATION)
COMMUNITY
Start: 2024-03-05

## 2024-03-19 RX ORDER — TRAMADOL HYDROCHLORIDE 50 MG/1
50 TABLET ORAL EVERY 12 HOURS PRN
COMMUNITY
Start: 2024-01-10

## 2024-03-19 RX ORDER — ONDANSETRON 2 MG/ML
4 INJECTION INTRAMUSCULAR; INTRAVENOUS
Status: COMPLETED | OUTPATIENT
Start: 2024-03-20 | End: 2024-03-20

## 2024-03-20 ENCOUNTER — APPOINTMENT (OUTPATIENT)
Facility: HOSPITAL | Age: 74
DRG: 377 | End: 2024-03-20
Attending: INTERNAL MEDICINE
Payer: MEDICARE

## 2024-03-20 ENCOUNTER — APPOINTMENT (OUTPATIENT)
Facility: HOSPITAL | Age: 74
DRG: 377 | End: 2024-03-20
Payer: MEDICARE

## 2024-03-20 PROBLEM — K92.2 UGIB (UPPER GASTROINTESTINAL BLEED): Status: ACTIVE | Noted: 2024-03-20

## 2024-03-20 PROBLEM — K92.1 GASTROINTESTINAL HEMORRHAGE WITH MELENA: Status: ACTIVE | Noted: 2024-03-20

## 2024-03-20 LAB
ANION GAP SERPL CALC-SCNC: 9 MMOL/L (ref 3–18)
APPEARANCE UR: CLEAR
APTT PPP: 37 SEC (ref 23–36.4)
BACTERIA URNS QL MICRO: ABNORMAL /HPF
BASOPHILS # BLD: 0 K/UL (ref 0–0.1)
BASOPHILS NFR BLD: 0 % (ref 0–2)
BILIRUB UR QL: NEGATIVE
BUN SERPL-MCNC: 50 MG/DL (ref 7–18)
BUN/CREAT SERPL: 9 (ref 12–20)
CALCIUM SERPL-MCNC: 8.5 MG/DL (ref 8.5–10.1)
CHLORIDE SERPL-SCNC: 101 MMOL/L (ref 100–111)
CO2 SERPL-SCNC: 31 MMOL/L (ref 21–32)
COLOR UR: ABNORMAL
CREAT SERPL-MCNC: 5.47 MG/DL (ref 0.6–1.3)
DIFFERENTIAL METHOD BLD: ABNORMAL
EKG ATRIAL RATE: 97 BPM
EKG DIAGNOSIS: NORMAL
EKG P AXIS: 82 DEGREES
EKG P-R INTERVAL: 182 MS
EKG Q-T INTERVAL: 388 MS
EKG QRS DURATION: 78 MS
EKG QTC CALCULATION (BAZETT): 492 MS
EKG R AXIS: 67 DEGREES
EKG T AXIS: 102 DEGREES
EKG VENTRICULAR RATE: 97 BPM
EOSINOPHIL # BLD: 0 K/UL (ref 0–0.4)
EOSINOPHIL NFR BLD: 0 % (ref 0–5)
EPITH CASTS URNS QL MICRO: ABNORMAL /LPF (ref 0–5)
ERYTHROCYTE [DISTWIDTH] IN BLOOD BY AUTOMATED COUNT: 17.7 % (ref 11.6–14.5)
FERRITIN SERPL-MCNC: 463 NG/ML (ref 8–388)
FLUAV RNA SPEC QL NAA+PROBE: NOT DETECTED
FLUBV RNA SPEC QL NAA+PROBE: NOT DETECTED
GLUCOSE BLD STRIP.AUTO-MCNC: 105 MG/DL (ref 70–110)
GLUCOSE BLD STRIP.AUTO-MCNC: 107 MG/DL (ref 70–110)
GLUCOSE BLD STRIP.AUTO-MCNC: 176 MG/DL (ref 70–110)
GLUCOSE SERPL-MCNC: 190 MG/DL (ref 74–99)
GLUCOSE UR STRIP.AUTO-MCNC: NEGATIVE MG/DL
HCT VFR BLD AUTO: 18.7 % (ref 35–45)
HCT VFR BLD AUTO: 25 % (ref 35–45)
HCT VFR BLD AUTO: 28.8 % (ref 35–45)
HEMOCCULT STL QL: POSITIVE
HGB BLD-MCNC: 5.8 G/DL (ref 12–16)
HGB BLD-MCNC: 8.2 G/DL (ref 12–16)
HGB BLD-MCNC: 9 G/DL (ref 12–16)
HGB UR QL STRIP: NEGATIVE
HISTORY CHECK: NORMAL
HISTORY CHECK: NORMAL
IMM GRANULOCYTES # BLD AUTO: 0.1 K/UL (ref 0–0.04)
IMM GRANULOCYTES NFR BLD AUTO: 1 % (ref 0–0.5)
INR PPP: 1.5 (ref 0.9–1.1)
IRON SATN MFR SERPL: 36 % (ref 20–50)
IRON SERPL-MCNC: 99 UG/DL (ref 50–175)
KETONES UR QL STRIP.AUTO: ABNORMAL MG/DL
LACTATE SERPL-SCNC: 1 MMOL/L (ref 0.4–2)
LACTATE SERPL-SCNC: 2.6 MMOL/L (ref 0.4–2)
LEUKOCYTE ESTERASE UR QL STRIP.AUTO: NEGATIVE
LYMPHOCYTES # BLD: 0.2 K/UL (ref 0.9–3.6)
LYMPHOCYTES NFR BLD: 3 % (ref 21–52)
MAGNESIUM SERPL-MCNC: 2.1 MG/DL (ref 1.6–2.6)
MCH RBC QN AUTO: 29.9 PG (ref 24–34)
MCHC RBC AUTO-ENTMCNC: 31 G/DL (ref 31–37)
MCV RBC AUTO: 96.4 FL (ref 78–100)
MONOCYTES # BLD: 0.2 K/UL (ref 0.05–1.2)
MONOCYTES NFR BLD: 2 % (ref 3–10)
NEUTS SEG # BLD: 7.2 K/UL (ref 1.8–8)
NEUTS SEG NFR BLD: 94 % (ref 40–73)
NITRITE UR QL STRIP.AUTO: NEGATIVE
NRBC # BLD: 0.09 K/UL (ref 0–0.01)
NRBC BLD-RTO: 1.2 PER 100 WBC
PH UR STRIP: 5.5 (ref 5–8)
PLATELET # BLD AUTO: 335 K/UL (ref 135–420)
PMV BLD AUTO: 9 FL (ref 9.2–11.8)
POTASSIUM SERPL-SCNC: 3.9 MMOL/L (ref 3.5–5.5)
PROCALCITONIN SERPL-MCNC: 0.34 NG/ML
PROT UR STRIP-MCNC: ABNORMAL MG/DL
PROTHROMBIN TIME: 18.1 SEC (ref 11.9–14.7)
RBC # BLD AUTO: 1.94 M/UL (ref 4.2–5.3)
RBC #/AREA URNS HPF: NEGATIVE /HPF (ref 0–5)
SARS-COV-2 RNA RESP QL NAA+PROBE: NOT DETECTED
SODIUM SERPL-SCNC: 141 MMOL/L (ref 136–145)
SP GR UR REFRACTOMETRY: >1.03 (ref 1–1.03)
TIBC SERPL-MCNC: 273 UG/DL (ref 250–450)
UROBILINOGEN UR QL STRIP.AUTO: 0.2 EU/DL (ref 0.2–1)
WBC # BLD AUTO: 7.7 K/UL (ref 4.6–13.2)
WBC URNS QL MICRO: NEGATIVE /HPF (ref 0–4)

## 2024-03-20 PROCEDURE — 73610 X-RAY EXAM OF ANKLE: CPT

## 2024-03-20 PROCEDURE — 2580000003 HC RX 258: Performed by: INTERNAL MEDICINE

## 2024-03-20 PROCEDURE — 93010 ELECTROCARDIOGRAM REPORT: CPT | Performed by: INTERNAL MEDICINE

## 2024-03-20 PROCEDURE — 90935 HEMODIALYSIS ONE EVALUATION: CPT

## 2024-03-20 PROCEDURE — 73552 X-RAY EXAM OF FEMUR 2/>: CPT

## 2024-03-20 PROCEDURE — 96374 THER/PROPH/DIAG INJ IV PUSH: CPT

## 2024-03-20 PROCEDURE — 83550 IRON BINDING TEST: CPT

## 2024-03-20 PROCEDURE — 99223 1ST HOSP IP/OBS HIGH 75: CPT | Performed by: INTERNAL MEDICINE

## 2024-03-20 PROCEDURE — 6360000002 HC RX W HCPCS: Performed by: INTERNAL MEDICINE

## 2024-03-20 PROCEDURE — 6370000000 HC RX 637 (ALT 250 FOR IP): Performed by: INTERNAL MEDICINE

## 2024-03-20 PROCEDURE — 82728 ASSAY OF FERRITIN: CPT

## 2024-03-20 PROCEDURE — 2580000003 HC RX 258

## 2024-03-20 PROCEDURE — 85014 HEMATOCRIT: CPT

## 2024-03-20 PROCEDURE — 6360000002 HC RX W HCPCS

## 2024-03-20 PROCEDURE — 36415 COLL VENOUS BLD VENIPUNCTURE: CPT

## 2024-03-20 PROCEDURE — 96375 TX/PRO/DX INJ NEW DRUG ADDON: CPT

## 2024-03-20 PROCEDURE — 86923 COMPATIBILITY TEST ELECTRIC: CPT

## 2024-03-20 PROCEDURE — 86850 RBC ANTIBODY SCREEN: CPT

## 2024-03-20 PROCEDURE — 73562 X-RAY EXAM OF KNEE 3: CPT

## 2024-03-20 PROCEDURE — 85018 HEMOGLOBIN: CPT

## 2024-03-20 PROCEDURE — 30233N1 TRANSFUSION OF NONAUTOLOGOUS RED BLOOD CELLS INTO PERIPHERAL VEIN, PERCUTANEOUS APPROACH: ICD-10-PCS | Performed by: HOSPITALIST

## 2024-03-20 PROCEDURE — 93970 EXTREMITY STUDY: CPT

## 2024-03-20 PROCEDURE — 83540 ASSAY OF IRON: CPT

## 2024-03-20 PROCEDURE — P9016 RBC LEUKOCYTES REDUCED: HCPCS

## 2024-03-20 PROCEDURE — 74177 CT ABD & PELVIS W/CONTRAST: CPT

## 2024-03-20 PROCEDURE — 5A1D70Z PERFORMANCE OF URINARY FILTRATION, INTERMITTENT, LESS THAN 6 HOURS PER DAY: ICD-10-PCS | Performed by: HOSPITALIST

## 2024-03-20 PROCEDURE — 36430 TRANSFUSION BLD/BLD COMPNT: CPT

## 2024-03-20 PROCEDURE — 83605 ASSAY OF LACTIC ACID: CPT

## 2024-03-20 PROCEDURE — 81001 URINALYSIS AUTO W/SCOPE: CPT

## 2024-03-20 PROCEDURE — 82272 OCCULT BLD FECES 1-3 TESTS: CPT

## 2024-03-20 PROCEDURE — 86901 BLOOD TYPING SEROLOGIC RH(D): CPT

## 2024-03-20 PROCEDURE — 86900 BLOOD TYPING SEROLOGIC ABO: CPT

## 2024-03-20 PROCEDURE — 82962 GLUCOSE BLOOD TEST: CPT

## 2024-03-20 PROCEDURE — 87636 SARSCOV2 & INF A&B AMP PRB: CPT

## 2024-03-20 PROCEDURE — 71045 X-RAY EXAM CHEST 1 VIEW: CPT

## 2024-03-20 PROCEDURE — 1100000003 HC PRIVATE W/ TELEMETRY

## 2024-03-20 PROCEDURE — 6360000004 HC RX CONTRAST MEDICATION

## 2024-03-20 RX ORDER — ONDANSETRON 2 MG/ML
4 INJECTION INTRAMUSCULAR; INTRAVENOUS EVERY 6 HOURS PRN
Status: DISCONTINUED | OUTPATIENT
Start: 2024-03-20 | End: 2024-03-26 | Stop reason: HOSPADM

## 2024-03-20 RX ORDER — SODIUM CHLORIDE 9 MG/ML
INJECTION, SOLUTION INTRAVENOUS PRN
Status: DISCONTINUED | OUTPATIENT
Start: 2024-03-20 | End: 2024-03-22

## 2024-03-20 RX ORDER — SODIUM CHLORIDE 9 MG/ML
INJECTION, SOLUTION INTRAVENOUS PRN
Status: DISCONTINUED | OUTPATIENT
Start: 2024-03-20 | End: 2024-03-26 | Stop reason: HOSPADM

## 2024-03-20 RX ORDER — ACETAMINOPHEN 650 MG/1
650 SUPPOSITORY RECTAL EVERY 6 HOURS PRN
Status: DISCONTINUED | OUTPATIENT
Start: 2024-03-20 | End: 2024-03-26 | Stop reason: HOSPADM

## 2024-03-20 RX ORDER — SODIUM CHLORIDE 0.9 % (FLUSH) 0.9 %
5-40 SYRINGE (ML) INJECTION PRN
Status: DISCONTINUED | OUTPATIENT
Start: 2024-03-20 | End: 2024-03-26 | Stop reason: HOSPADM

## 2024-03-20 RX ORDER — POLYETHYLENE GLYCOL 3350 17 G/17G
17 POWDER, FOR SOLUTION ORAL DAILY PRN
Status: DISCONTINUED | OUTPATIENT
Start: 2024-03-20 | End: 2024-03-23

## 2024-03-20 RX ORDER — ACETAMINOPHEN 325 MG/1
650 TABLET ORAL ONCE
Status: COMPLETED | OUTPATIENT
Start: 2024-03-20 | End: 2024-03-20

## 2024-03-20 RX ORDER — ONDANSETRON 4 MG/1
4 TABLET, ORALLY DISINTEGRATING ORAL EVERY 8 HOURS PRN
Status: DISCONTINUED | OUTPATIENT
Start: 2024-03-20 | End: 2024-03-26 | Stop reason: HOSPADM

## 2024-03-20 RX ORDER — INSULIN LISPRO 100 [IU]/ML
0-4 INJECTION, SOLUTION INTRAVENOUS; SUBCUTANEOUS NIGHTLY
Status: DISCONTINUED | OUTPATIENT
Start: 2024-03-20 | End: 2024-03-26 | Stop reason: HOSPADM

## 2024-03-20 RX ORDER — INSULIN LISPRO 100 [IU]/ML
0-8 INJECTION, SOLUTION INTRAVENOUS; SUBCUTANEOUS
Status: DISCONTINUED | OUTPATIENT
Start: 2024-03-20 | End: 2024-03-26 | Stop reason: HOSPADM

## 2024-03-20 RX ORDER — LANOLIN ALCOHOL/MO/W.PET/CERES
3 CREAM (GRAM) TOPICAL NIGHTLY
Status: DISCONTINUED | OUTPATIENT
Start: 2024-03-20 | End: 2024-03-26 | Stop reason: HOSPADM

## 2024-03-20 RX ORDER — METOPROLOL SUCCINATE 50 MG/1
50 TABLET, EXTENDED RELEASE ORAL DAILY
Status: DISCONTINUED | OUTPATIENT
Start: 2024-03-21 | End: 2024-03-26 | Stop reason: HOSPADM

## 2024-03-20 RX ORDER — ALLOPURINOL 100 MG/1
100 TABLET ORAL
Status: DISCONTINUED | OUTPATIENT
Start: 2024-03-20 | End: 2024-03-26 | Stop reason: HOSPADM

## 2024-03-20 RX ORDER — INSULIN GLARGINE 100 [IU]/ML
6 INJECTION, SOLUTION SUBCUTANEOUS EVERY MORNING
Status: DISCONTINUED | OUTPATIENT
Start: 2024-03-21 | End: 2024-03-26 | Stop reason: HOSPADM

## 2024-03-20 RX ORDER — SODIUM CHLORIDE 0.9 % (FLUSH) 0.9 %
5-40 SYRINGE (ML) INJECTION EVERY 12 HOURS SCHEDULED
Status: DISCONTINUED | OUTPATIENT
Start: 2024-03-20 | End: 2024-03-26 | Stop reason: HOSPADM

## 2024-03-20 RX ORDER — ATORVASTATIN CALCIUM 40 MG/1
40 TABLET, FILM COATED ORAL NIGHTLY
Status: DISCONTINUED | OUTPATIENT
Start: 2024-03-20 | End: 2024-03-26 | Stop reason: HOSPADM

## 2024-03-20 RX ORDER — HYDROMORPHONE HYDROCHLORIDE 1 MG/ML
1 INJECTION, SOLUTION INTRAMUSCULAR; INTRAVENOUS; SUBCUTANEOUS
Status: COMPLETED | OUTPATIENT
Start: 2024-03-20 | End: 2024-03-20

## 2024-03-20 RX ORDER — IPRATROPIUM BROMIDE AND ALBUTEROL SULFATE 2.5; .5 MG/3ML; MG/3ML
1 SOLUTION RESPIRATORY (INHALATION) EVERY 4 HOURS PRN
Status: DISCONTINUED | OUTPATIENT
Start: 2024-03-20 | End: 2024-03-26 | Stop reason: HOSPADM

## 2024-03-20 RX ORDER — ACETAMINOPHEN 325 MG/1
650 TABLET ORAL EVERY 6 HOURS PRN
Status: DISCONTINUED | OUTPATIENT
Start: 2024-03-20 | End: 2024-03-26 | Stop reason: HOSPADM

## 2024-03-20 RX ADMIN — ALLOPURINOL 100 MG: 100 TABLET ORAL at 21:26

## 2024-03-20 RX ADMIN — IOPAMIDOL 80 ML: 612 INJECTION, SOLUTION INTRAVENOUS at 01:49

## 2024-03-20 RX ADMIN — FENTANYL CITRATE 25 MCG: 50 INJECTION INTRAMUSCULAR; INTRAVENOUS at 01:22

## 2024-03-20 RX ADMIN — ACETAMINOPHEN 325MG 650 MG: 325 TABLET ORAL at 13:19

## 2024-03-20 RX ADMIN — ONDANSETRON 4 MG: 2 INJECTION INTRAMUSCULAR; INTRAVENOUS at 01:22

## 2024-03-20 RX ADMIN — SODIUM CHLORIDE, PRESERVATIVE FREE 10 ML: 5 INJECTION INTRAVENOUS at 21:29

## 2024-03-20 RX ADMIN — ATORVASTATIN CALCIUM 40 MG: 40 TABLET, FILM COATED ORAL at 21:26

## 2024-03-20 RX ADMIN — MELATONIN 3 MG ORAL TABLET 3 MG: 3 TABLET ORAL at 21:26

## 2024-03-20 RX ADMIN — HYDROMORPHONE HYDROCHLORIDE 1 MG: 1 INJECTION, SOLUTION INTRAMUSCULAR; INTRAVENOUS; SUBCUTANEOUS at 05:19

## 2024-03-20 RX ADMIN — PIPERACILLIN AND TAZOBACTAM 4500 MG: 4; .5 INJECTION, POWDER, FOR SOLUTION INTRAVENOUS at 06:00

## 2024-03-20 ASSESSMENT — PAIN DESCRIPTION - ORIENTATION
ORIENTATION: RIGHT
ORIENTATION: RIGHT
ORIENTATION: RIGHT;LEFT
ORIENTATION: MID;LOWER

## 2024-03-20 ASSESSMENT — PAIN DESCRIPTION - LOCATION
LOCATION: BACK
LOCATION: ARM
LOCATION: ARM
LOCATION: LEG

## 2024-03-20 ASSESSMENT — PAIN SCALES - GENERAL
PAINLEVEL_OUTOF10: 7
PAINLEVEL_OUTOF10: 7
PAINLEVEL_OUTOF10: 0
PAINLEVEL_OUTOF10: 4
PAINLEVEL_OUTOF10: 10

## 2024-03-20 ASSESSMENT — PAIN DESCRIPTION - DESCRIPTORS: DESCRIPTORS: ACHING;SORE

## 2024-03-20 NOTE — CONSENT
Informed Consent for Blood Component Transfusion Note    I have discussed with the patient the rationale for blood component transfusion; its benefits in treating or preventing fatigue, organ damage, or death; and its risk which includes mild transfusion reactions, rare risk of blood borne infection, or more serious but rare reactions. I have discussed the alternatives to transfusion, including the risk and consequences of not receiving transfusion. The patient had an opportunity to ask questions and had agreed to proceed with transfusion of blood components.    Electronically signed by Aldo Arzola MD on 3/20/24 at 2:07 AM EDT

## 2024-03-20 NOTE — ED NOTES
ED TO INPATIENT SBAR HANDOFF    Patient Name: Christy Donahue   Preferred Name: Christy  : 1950  73 y.o.   Family/Caregiver Present: no   Code Status Order: Prior  PO Status: diet order, clear liquids  Telemetry Order:   C-SSRS: Risk of Suicide: No Risk  Sitter no  Restraints:     Sepsis Risk Score Sepsis Risk Score: 12.2    Situation  Chief Complaint   Patient presents with    Leg Pain     Brief Description of Patient's Condition: Several falls at home last couple of days, uses walker, is a dialysis patient, days are m, w, and fr.  Pt c/o lower leg pain and abd pain.  Difficult IV stick, 2 ultrasound IV's infiltrated, now has IV right EJ, Hgb was 5.8, blood consent signed, 1 unit has been given, antibiotics given, blood cultures x2 done, lactic 2.6 then 1.0. V.S stable.  Provider aware of difficult IV stick and stated ok to give blood in dialysis.  Stool positive hemoccult.  Need H & H 1 hr post transfusion.    Mental Status: oriented  Arrived from:Home  Imaging:   CT ABDOMEN PELVIS W IV CONTRAST Additional Contrast? Radiologist Recommendation   Final Result      Gastric body wall abnormality as described, possibly focal gastritis or ulcer   disease. Correlate clinically and consider endoscopic correlation further   characterize at some point.      Suspected fibroid uterus. Atherosclerosis. Likely right renal cysts similar to   prior. See additional details above.      XR ANKLE LEFT (MIN 3 VIEWS)   Final Result      Left femur:   -No acute osseous findings.      Left knee:   -No acute osseous findings.      Left ankle:   -No acute osseous findings.      See additional details above.      Left femur:   No acute osseous findings. Degenerative changes as above.      XR ANKLE RIGHT (MIN 3 VIEWS)   Final Result      Right femur:   -No acute osseous findings.      Right ankle:   -No acute osseous findings.      See additional details above.      XR KNEE LEFT (3 VIEWS)   Final Result      Left femur:   -No acute

## 2024-03-20 NOTE — ED NOTES
When speaking to Dr Pulido he stated if we need to give blood during dialysis they can give at that time d/t IV issue.  I went ahead and gave antibiotic that was due at 3am after first unit was completed.

## 2024-03-20 NOTE — H&P
History and Physical    Patient: Christy Donahue MRN: [id number]  SSN: [id number]    YOB: 1950  Age: 73 y.o.  Sex: female      Dani Pulaski Della, APRN - NP    C/C : Melena    Subjective:      Christy Donahue is a 73 y.o. female with PMH of end-stage renal disease on hemodialysis , chronic anemia, history of DVT left popliteal and posterior tibial vein on Eliquis diabetes mellitus type 2, chronic pain, history of ESBL and intraoperative abdominal fluid cultures, left hip pain, wheelchair-bound, recently discharged on 3/9/2024,h/o Diverticulitis , s/p colostomy then  She subsequently had the colostomy takedown on June 30, 2023 by Dr. Ga via exploratory laparotomy with adheseolysis.     Patient is now being admitted with a history of fall from wheelchair and pain EMS brought patient to the emergency room where her CBC showed hemoglobin of 5.8 and 18.7 also history of melena about 2 to 3 days.Stool occult positive per ED . GI consulted from ED     Past Medical History:   Diagnosis Date    Anemia     Chondromalacia of both patellae     Chronic heart failure with preserved ejection fraction ([facility]) 01/13/2023    Chronic kidney disease     COPD (chronic obstructive pulmonary disease) ([facility]) 09/2015    mild-mod dz; Dr DEBORAH Yepez    Diabetes ([facility]) 2013    Dilated cardiomyopathy ([facility])     Dyslipidemia     Gout     Heart attack ([facility]) 10/18/2019    Stented    Hypercholesteremia 01/08/2014    Hypertension 2000    Mild protein-calorie malnutrition ([facility]) 8/7/2023    Obesity     Orthostatic hypotension 05/17/2016    Osteoarthritis of both knees     Popliteal cyst     Sleep apnea     not using cpap    Stage 3b chronic kidney disease ([facility]) 08/08/2022    Vitamin D deficiency 10/16/2014     Past Surgical History:   Procedure Laterality Date    CARDIAC CATHETERIZATION  2019    Stents x 2    CARDIAC PROCEDURE N/A 8/9/2023    Left heart cath performed by Mani King MD at [facility] CARDIAC CATH LAB    CARDIAC PROCEDURE N/A

## 2024-03-20 NOTE — ED NOTES
Pt c/o pain at blood transfusion site, stopped blood, attemptd to flush, pain, no blood return, iv dc;d, restarted in right EJ by Rosmery KNOX.  Dr Pulido notified of infiltrated IV's, last 2 started with ultrasound and infiltrated.

## 2024-03-20 NOTE — ED PROVIDER NOTES
(DUONEB) nebulizer solution 1 Dose (has no administration in time range)   ondansetron (ZOFRAN) injection 4 mg (4 mg IntraVENous Given 3/20/24 0122)   fentaNYL (SUBLIMAZE) injection 25 mcg (25 mcg IntraVENous Given 3/20/24 0122)   iopamidol (ISOVUE-300) 61 % injection 80 mL (80 mLs IntraVENous Given 3/20/24 0149)              Medical Decision Making   Initial Medical Decision Making and DDx:  73-year-old female history of end-stage renal disease, hypertension, COPD, coronary artery disease, presenting to the emergency department due to generalized weakness, abdominal pain, vomiting x 1 week complicated by 3 episodes of ground-level falls.  Additionally, patient with low-grade fever and tachycardia in the emergency department, raising concern for sepsis physiology. PE remarkable for tenderness to palpation in the abdomen, tenderness to palpation of the bilateral medial malleoli, left knee, right femur.  Passive range of motion intact in the hip.  Concern for trauma to the bilateral lower extremity secondary to ground-level falls.  Differential for generalized weakness includes underlying infection, possbile sepsis physiology, versus electrolyte abnormality.  Stroke considered but less likely given no evidence of unilateral sensorimotor defect.  NIHSS equals 0.  Plan as follows:    -Pain control fentanyl 25 mcg + zofran 4mg  -CBC, BMP, Mg, Lactate, Procal  -RVP, UA, CXR  -XR Les  -CT A/P  -EKG    Dispo pending results of workup.    ED Course: Progress Notes, Reevaluation, and Consults:  ED Course as of 03/20/24 0359   Tue Mar 19, 2024   2347 MAP (Calculated): 82 [MT]   2351 Saw and evaluated the patient at this time, pain in the bilateral lower extremities to the knees some pain in her belly she is obese.  Generally feeling poorly with a borderline fever.  Consider sepsis, viral illness, traumatic injury.  She has had 3 ground-level falls due to generalized weakness.  I supervised the residents medical management plan

## 2024-03-20 NOTE — ED TRIAGE NOTES
Medics were called for leg pain they report that patient states that she \"flipped over her walker three times yesterday, and now has bilateral leg pain.

## 2024-03-20 NOTE — ED NOTES
Pt back from CT and IV would not flush, had to restart with ultrasound, blood now infusing, antibiotic will be given when finished, ordered for blood over 3 hours.

## 2024-03-20 NOTE — CONSULTS
Consult Note  Consult requested by: Dr. Christy Donahue is a 73 y.o. female Black /  who is being seen on consult for ESRD management.   Chief Complaint   Patient presents with    Leg Pain     Admission diagnosis: UGIB (upper gastrointestinal bleed)     73-year-old female with past medical history of ESRD, on anticoagulations admitted for severe anemia, following for ESRD management.  Impression & Plan:   IMPRESSION:   ESRD, Monday Wednesday Friday schedule  Access AV fistula  Anemia, GI bleed, on anticoagulation for DVT  Systolic heart failure,  COPD on home oxygen  Diabetes   PLAN:   Arrange dialysis today with 2K bath, UF goal 1.5 to 2 L as tolerated.  Continue to monitor hemoglobin, blood transfusion per primary team.  Adjust medication per ESRD status.     Thank you very much for allowing me to participate in the care of this patient.    We will continue to monitor with you and make recommendations as needed.       HPI: 73-year-old female with past medical history of end-stage renal disease, congestive heart failure, on anticoagulation for DVT came to the emergency room with severe anemia, hemoglobin 5.8 g per DL.    She is admitted to the floor received blood transfusion and has been evaluated by GI colleague.      Nephrology service consulted for ESRD management.    This morning she denies for any chest pain or short of breath.  She is due for dialysis today.  Past Medical History:   Diagnosis Date    Anemia     Chondromalacia of both patellae     Chronic heart failure with preserved ejection fraction ([facility]) 01/13/2023    Chronic kidney disease     COPD (chronic obstructive pulmonary disease) ([facility]) 09/2015    mild-mod dz; Dr DEBORAH Yepez    Diabetes ([facility]) 2013    Dilated cardiomyopathy ([facility])     Dyslipidemia     Gout     Heart attack ([facility]) 10/18/2019    Stented    Hypercholesteremia 01/08/2014    Hypertension 2000    Mild protein-calorie malnutrition ([facility]) 8/7/2023    Obesity     Orthostatic 
  Problem Relation Age of Onset    Diabetes Mother     Hypertension Mother     Hypertension Father     Kidney Disease Maternal Aunt 50        Dialysis       Allergy:   No Known Allergies    Patient Active Problem List   Diagnosis    Environmental allergies    MICHAEL on CPAP    Chronic pain syndrome    Osteoarthritis of both knees    Hypotension    Coronary artery disease involving native coronary artery of native heart without angina pectoris    Acute pulmonary edema (HCC)    Coronary artery disease involving native coronary artery with unstable angina pectoris (HCC)    Pulmonary edema cardiac cause (HCC)    Hyperkalemia    Chronic obstructive pulmonary disease (HCC)    Bilateral shoulder pain    Respiratory failure requiring intubation (HCC)    Flash pulmonary edema (HCC)    Elevated d-dimer    Syncope    CAD (coronary artery disease)    Compliance with medication regimen    Chronic pain of both knees    Ischemic cardiomyopathy    Type 2 diabetes mellitus with hyperglycemia (HCC)    Hyperlipidemia    Iron deficiency anemia    SOB (shortness of breath)    Acute on chronic systolic heart failure (HCC)    Former smoker    Moderate mitral valve regurgitation    Caregiver stress    Cardiomyopathy, dilated (HCC)    Sepsis (HCC)    Pulmonary edema    Severe obesity (BMI 35.0-39.9) with comorbidity (HCC)    COPD with acute exacerbation (HCC)    Primary osteoarthritis of both knees    Elevated alkaline phosphatase level    Decreased GFR    Hypertension    NSTEMI (non-ST elevated myocardial infarction) (HCC)    Acute hyperkalemia    Breast cancer (HCC)    Rectal cancer (HCC)    History of myocardial infarction    Stage 1 acute kidney injury (HCC)    Pulmonary hypertension (HCC)    Acute exacerbation of CHF (congestive heart failure) (HCC)    Acute hypoxic respiratory failure (HCC)    Pneumonia due to COVID-19 virus    Acute on chronic diastolic heart failure (HCC)    Stage 3b chronic kidney disease (HCC)    CHF (congestive

## 2024-03-20 NOTE — ACP (ADVANCE CARE PLANNING)
Advance Care Planning   Healthcare Decision Maker:    Primary Decision Maker: Lance Donahue - Spouse - [phone]    Click here to complete Healthcare Decision Makers including selection of the Healthcare Decision Maker Relationship (ie \"Primary\").          completed the initial Spiritual Assessment of the patient, and offered Pastoral Care support and prayer, She was admitted due to an Upper GI Bleed. Patient has an advance directive on file here. Patient does not have any Restoration/cultural needs that will affect patient’s preferences in health care. Chaplains will continue to follow and will provide pastoral care on an as needed/requested basis.    Chaplain Liu Fink  Board Certified   Spiritual Care Department  [phone]

## 2024-03-21 LAB
ABO + RH BLD: NORMAL
ANION GAP SERPL CALC-SCNC: 9 MMOL/L (ref 3–18)
BLD PROD TYP BPU: NORMAL
BLD PROD TYP BPU: NORMAL
BLOOD BANK BLOOD PRODUCT EXPIRATION DATE: NORMAL
BLOOD BANK BLOOD PRODUCT EXPIRATION DATE: NORMAL
BLOOD BANK DISPENSE STATUS: NORMAL
BLOOD BANK DISPENSE STATUS: NORMAL
BLOOD BANK ISBT PRODUCT BLOOD TYPE: 5100
BLOOD BANK ISBT PRODUCT BLOOD TYPE: 5100
BLOOD BANK PRODUCT CODE: NORMAL
BLOOD BANK PRODUCT CODE: NORMAL
BLOOD BANK UNIT TYPE AND RH: NORMAL
BLOOD BANK UNIT TYPE AND RH: NORMAL
BLOOD GROUP ANTIBODIES SERPL: NORMAL
BPU ID: NORMAL
BPU ID: NORMAL
BUN SERPL-MCNC: 25 MG/DL (ref 7–18)
BUN/CREAT SERPL: 7 (ref 12–20)
CALCIUM SERPL-MCNC: 9 MG/DL (ref 8.5–10.1)
CALLED TO: NORMAL
CALLED TO:: NORMAL
CHLORIDE SERPL-SCNC: 99 MMOL/L (ref 100–111)
CO2 SERPL-SCNC: 31 MMOL/L (ref 21–32)
CREAT SERPL-MCNC: 3.59 MG/DL (ref 0.6–1.3)
CROSSMATCH RESULT: NORMAL
CROSSMATCH RESULT: NORMAL
GLUCOSE BLD STRIP.AUTO-MCNC: 104 MG/DL (ref 70–110)
GLUCOSE BLD STRIP.AUTO-MCNC: 106 MG/DL (ref 70–110)
GLUCOSE BLD STRIP.AUTO-MCNC: 112 MG/DL (ref 70–110)
GLUCOSE BLD STRIP.AUTO-MCNC: 141 MG/DL (ref 70–110)
GLUCOSE SERPL-MCNC: 101 MG/DL (ref 74–99)
HCT VFR BLD AUTO: 25.9 % (ref 35–45)
HCT VFR BLD AUTO: 26.6 % (ref 35–45)
HCT VFR BLD AUTO: 27.3 % (ref 35–45)
HGB BLD-MCNC: 8.3 G/DL (ref 12–16)
HGB BLD-MCNC: 8.3 G/DL (ref 12–16)
HGB BLD-MCNC: 8.5 G/DL (ref 12–16)
INR PPP: 1.2 (ref 0.9–1.1)
MAGNESIUM SERPL-MCNC: 1.9 MG/DL (ref 1.6–2.6)
POTASSIUM SERPL-SCNC: 3.3 MMOL/L (ref 3.5–5.5)
PROTHROMBIN TIME: 14.8 SEC (ref 11.9–14.7)
SODIUM SERPL-SCNC: 139 MMOL/L (ref 136–145)
SPECIMEN EXP DATE BLD: NORMAL
UNIT DIVISION: 0
UNIT DIVISION: 0
UNIT ISSUE DATE/TIME: NORMAL
UNIT ISSUE DATE/TIME: NORMAL

## 2024-03-21 PROCEDURE — 82962 GLUCOSE BLOOD TEST: CPT

## 2024-03-21 PROCEDURE — 80048 BASIC METABOLIC PNL TOTAL CA: CPT

## 2024-03-21 PROCEDURE — 1100000003 HC PRIVATE W/ TELEMETRY

## 2024-03-21 PROCEDURE — 2580000003 HC RX 258: Performed by: INTERNAL MEDICINE

## 2024-03-21 PROCEDURE — 85018 HEMOGLOBIN: CPT

## 2024-03-21 PROCEDURE — 6370000000 HC RX 637 (ALT 250 FOR IP): Performed by: PHYSICIAN ASSISTANT

## 2024-03-21 PROCEDURE — 85610 PROTHROMBIN TIME: CPT

## 2024-03-21 PROCEDURE — 94761 N-INVAS EAR/PLS OXIMETRY MLT: CPT

## 2024-03-21 PROCEDURE — 6360000002 HC RX W HCPCS: Performed by: INTERNAL MEDICINE

## 2024-03-21 PROCEDURE — 6370000000 HC RX 637 (ALT 250 FOR IP): Performed by: INTERNAL MEDICINE

## 2024-03-21 PROCEDURE — A4216 STERILE WATER/SALINE, 10 ML: HCPCS | Performed by: INTERNAL MEDICINE

## 2024-03-21 PROCEDURE — 85014 HEMATOCRIT: CPT

## 2024-03-21 PROCEDURE — 83735 ASSAY OF MAGNESIUM: CPT

## 2024-03-21 PROCEDURE — 6370000000 HC RX 637 (ALT 250 FOR IP): Performed by: HOSPITALIST

## 2024-03-21 PROCEDURE — C9113 INJ PANTOPRAZOLE SODIUM, VIA: HCPCS | Performed by: INTERNAL MEDICINE

## 2024-03-21 PROCEDURE — 36415 COLL VENOUS BLD VENIPUNCTURE: CPT

## 2024-03-21 PROCEDURE — 99232 SBSQ HOSP IP/OBS MODERATE 35: CPT | Performed by: HOSPITALIST

## 2024-03-21 RX ORDER — GABAPENTIN 400 MG/1
400 CAPSULE ORAL 3 TIMES DAILY
Status: ON HOLD | COMMUNITY
End: 2024-03-26

## 2024-03-21 RX ORDER — LUBIPROSTONE 8 UG/1
16 CAPSULE ORAL 2 TIMES DAILY WITH MEALS
Status: DISCONTINUED | OUTPATIENT
Start: 2024-03-21 | End: 2024-03-26 | Stop reason: HOSPADM

## 2024-03-21 RX ORDER — TRAMADOL HYDROCHLORIDE 50 MG/1
50 TABLET ORAL EVERY 6 HOURS PRN
Status: DISCONTINUED | OUTPATIENT
Start: 2024-03-21 | End: 2024-03-24

## 2024-03-21 RX ADMIN — SODIUM CHLORIDE, PRESERVATIVE FREE 10 ML: 5 INJECTION INTRAVENOUS at 21:57

## 2024-03-21 RX ADMIN — LUBIPROSTONE 16 MCG: 8 CAPSULE, GELATIN COATED ORAL at 16:24

## 2024-03-21 RX ADMIN — MELATONIN 3 MG ORAL TABLET 3 MG: 3 TABLET ORAL at 21:51

## 2024-03-21 RX ADMIN — INSULIN GLARGINE 6 UNITS: 100 INJECTION, SOLUTION SUBCUTANEOUS at 10:19

## 2024-03-21 RX ADMIN — TRAMADOL HYDROCHLORIDE 50 MG: 50 TABLET ORAL at 21:55

## 2024-03-21 RX ADMIN — TRAMADOL HYDROCHLORIDE 50 MG: 50 TABLET ORAL at 16:33

## 2024-03-21 RX ADMIN — TRAMADOL HYDROCHLORIDE 50 MG: 50 TABLET ORAL at 11:06

## 2024-03-21 RX ADMIN — SODIUM CHLORIDE, PRESERVATIVE FREE 10 ML: 5 INJECTION INTRAVENOUS at 10:19

## 2024-03-21 RX ADMIN — ATORVASTATIN CALCIUM 40 MG: 40 TABLET, FILM COATED ORAL at 21:51

## 2024-03-21 RX ADMIN — PANTOPRAZOLE SODIUM 40 MG: 40 INJECTION, POWDER, FOR SOLUTION INTRAVENOUS at 10:19

## 2024-03-21 ASSESSMENT — PAIN DESCRIPTION - FREQUENCY: FREQUENCY: INTERMITTENT

## 2024-03-21 ASSESSMENT — PAIN SCALES - GENERAL
PAINLEVEL_OUTOF10: 2
PAINLEVEL_OUTOF10: 7
PAINLEVEL_OUTOF10: 9
PAINLEVEL_OUTOF10: 10
PAINLEVEL_OUTOF10: 4
PAINLEVEL_OUTOF10: 0

## 2024-03-21 ASSESSMENT — PAIN DESCRIPTION - DESCRIPTORS
DESCRIPTORS: ACHING
DESCRIPTORS: ACHING;POUNDING;SQUEEZING
DESCRIPTORS: HEAVINESS

## 2024-03-21 ASSESSMENT — PAIN DESCRIPTION - LOCATION
LOCATION: LEG
LOCATION: BACK;KNEE
LOCATION: LEG

## 2024-03-21 ASSESSMENT — PAIN DESCRIPTION - ONSET: ONSET: GRADUAL

## 2024-03-21 ASSESSMENT — PAIN - FUNCTIONAL ASSESSMENT
PAIN_FUNCTIONAL_ASSESSMENT: ACTIVITIES ARE NOT PREVENTED

## 2024-03-21 ASSESSMENT — PAIN SCALES - WONG BAKER
WONGBAKER_NUMERICALRESPONSE: NO HURT
WONGBAKER_NUMERICALRESPONSE: HURTS A LITTLE BIT
WONGBAKER_NUMERICALRESPONSE: HURTS LITTLE MORE

## 2024-03-21 ASSESSMENT — PAIN DESCRIPTION - PAIN TYPE: TYPE: CHRONIC PAIN

## 2024-03-21 ASSESSMENT — PAIN DESCRIPTION - ORIENTATION
ORIENTATION: LEFT;RIGHT
ORIENTATION: RIGHT;LEFT

## 2024-03-22 LAB
ANION GAP SERPL CALC-SCNC: 6 MMOL/L (ref 3–18)
BUN SERPL-MCNC: 38 MG/DL (ref 7–18)
BUN/CREAT SERPL: 9 (ref 12–20)
CALCIUM SERPL-MCNC: 8.5 MG/DL (ref 8.5–10.1)
CHLORIDE SERPL-SCNC: 100 MMOL/L (ref 100–111)
CO2 SERPL-SCNC: 33 MMOL/L (ref 21–32)
CREAT SERPL-MCNC: 4.3 MG/DL (ref 0.6–1.3)
ECHO BSA: 1.93 M2
GLUCOSE BLD STRIP.AUTO-MCNC: 120 MG/DL (ref 70–110)
GLUCOSE BLD STRIP.AUTO-MCNC: 121 MG/DL (ref 70–110)
GLUCOSE BLD STRIP.AUTO-MCNC: 153 MG/DL (ref 70–110)
GLUCOSE BLD STRIP.AUTO-MCNC: 155 MG/DL (ref 70–110)
GLUCOSE SERPL-MCNC: 135 MG/DL (ref 74–99)
HCT VFR BLD AUTO: 24.6 % (ref 35–45)
HCT VFR BLD AUTO: 29.1 % (ref 35–45)
HGB BLD-MCNC: 7.9 G/DL (ref 12–16)
HGB BLD-MCNC: 9.1 G/DL (ref 12–16)
MAGNESIUM SERPL-MCNC: 2 MG/DL (ref 1.6–2.6)
POTASSIUM SERPL-SCNC: 3.5 MMOL/L (ref 3.5–5.5)
SODIUM SERPL-SCNC: 139 MMOL/L (ref 136–145)

## 2024-03-22 PROCEDURE — 6370000000 HC RX 637 (ALT 250 FOR IP): Performed by: INTERNAL MEDICINE

## 2024-03-22 PROCEDURE — 1100000003 HC PRIVATE W/ TELEMETRY

## 2024-03-22 PROCEDURE — 85014 HEMATOCRIT: CPT

## 2024-03-22 PROCEDURE — 6370000000 HC RX 637 (ALT 250 FOR IP): Performed by: PHYSICIAN ASSISTANT

## 2024-03-22 PROCEDURE — 99232 SBSQ HOSP IP/OBS MODERATE 35: CPT | Performed by: HOSPITALIST

## 2024-03-22 PROCEDURE — 80048 BASIC METABOLIC PNL TOTAL CA: CPT

## 2024-03-22 PROCEDURE — 94761 N-INVAS EAR/PLS OXIMETRY MLT: CPT

## 2024-03-22 PROCEDURE — 82962 GLUCOSE BLOOD TEST: CPT

## 2024-03-22 PROCEDURE — 90935 HEMODIALYSIS ONE EVALUATION: CPT

## 2024-03-22 PROCEDURE — 6370000000 HC RX 637 (ALT 250 FOR IP): Performed by: HOSPITALIST

## 2024-03-22 PROCEDURE — A4216 STERILE WATER/SALINE, 10 ML: HCPCS | Performed by: INTERNAL MEDICINE

## 2024-03-22 PROCEDURE — 93970 EXTREMITY STUDY: CPT | Performed by: SURGERY

## 2024-03-22 PROCEDURE — C9113 INJ PANTOPRAZOLE SODIUM, VIA: HCPCS | Performed by: INTERNAL MEDICINE

## 2024-03-22 PROCEDURE — 6360000002 HC RX W HCPCS: Performed by: INTERNAL MEDICINE

## 2024-03-22 PROCEDURE — 85018 HEMOGLOBIN: CPT

## 2024-03-22 PROCEDURE — 83735 ASSAY OF MAGNESIUM: CPT

## 2024-03-22 PROCEDURE — 36415 COLL VENOUS BLD VENIPUNCTURE: CPT

## 2024-03-22 PROCEDURE — 2580000003 HC RX 258: Performed by: INTERNAL MEDICINE

## 2024-03-22 RX ADMIN — MELATONIN 3 MG ORAL TABLET 3 MG: 3 TABLET ORAL at 21:18

## 2024-03-22 RX ADMIN — TRAMADOL HYDROCHLORIDE 50 MG: 50 TABLET ORAL at 16:53

## 2024-03-22 RX ADMIN — SODIUM CHLORIDE, PRESERVATIVE FREE 10 ML: 5 INJECTION INTRAVENOUS at 09:04

## 2024-03-22 RX ADMIN — ATORVASTATIN CALCIUM 40 MG: 40 TABLET, FILM COATED ORAL at 21:18

## 2024-03-22 RX ADMIN — LUBIPROSTONE 16 MCG: 8 CAPSULE, GELATIN COATED ORAL at 09:03

## 2024-03-22 RX ADMIN — TRAMADOL HYDROCHLORIDE 50 MG: 50 TABLET ORAL at 09:12

## 2024-03-22 RX ADMIN — ALLOPURINOL 100 MG: 100 TABLET ORAL at 21:18

## 2024-03-22 RX ADMIN — INSULIN GLARGINE 6 UNITS: 100 INJECTION, SOLUTION SUBCUTANEOUS at 09:04

## 2024-03-22 RX ADMIN — POLYETHYLENE GLYCOL 3350 17 G: 17 POWDER, FOR SOLUTION ORAL at 16:56

## 2024-03-22 RX ADMIN — TRAMADOL HYDROCHLORIDE 50 MG: 50 TABLET ORAL at 22:58

## 2024-03-22 RX ADMIN — LUBIPROSTONE 16 MCG: 8 CAPSULE, GELATIN COATED ORAL at 19:37

## 2024-03-22 RX ADMIN — SODIUM CHLORIDE, PRESERVATIVE FREE 5 ML: 5 INJECTION INTRAVENOUS at 21:45

## 2024-03-22 RX ADMIN — PANTOPRAZOLE SODIUM 40 MG: 40 INJECTION, POWDER, FOR SOLUTION INTRAVENOUS at 09:04

## 2024-03-22 RX ADMIN — ACETAMINOPHEN 325MG 650 MG: 325 TABLET ORAL at 13:36

## 2024-03-22 ASSESSMENT — PAIN DESCRIPTION - LOCATION
LOCATION: BACK;LEG
LOCATION: BACK
LOCATION: LEG
LOCATION: BACK
LOCATION: BACK
LOCATION: LEG

## 2024-03-22 ASSESSMENT — PAIN SCALES - GENERAL
PAINLEVEL_OUTOF10: 0
PAINLEVEL_OUTOF10: 2
PAINLEVEL_OUTOF10: 6
PAINLEVEL_OUTOF10: 7
PAINLEVEL_OUTOF10: 10
PAINLEVEL_OUTOF10: 6
PAINLEVEL_OUTOF10: 0
PAINLEVEL_OUTOF10: 5
PAINLEVEL_OUTOF10: 6
PAINLEVEL_OUTOF10: 10
PAINLEVEL_OUTOF10: 6
PAINLEVEL_OUTOF10: 0
PAINLEVEL_OUTOF10: 10

## 2024-03-22 ASSESSMENT — PAIN DESCRIPTION - DESCRIPTORS
DESCRIPTORS: DISCOMFORT
DESCRIPTORS: ACHING
DESCRIPTORS: ACHING
DESCRIPTORS: SHARP
DESCRIPTORS: ACHING
DESCRIPTORS: DISCOMFORT
DESCRIPTORS: SHARP

## 2024-03-22 ASSESSMENT — PAIN SCALES - WONG BAKER
WONGBAKER_NUMERICALRESPONSE: NO HURT
WONGBAKER_NUMERICALRESPONSE: HURTS A LITTLE BIT
WONGBAKER_NUMERICALRESPONSE: NO HURT
WONGBAKER_NUMERICALRESPONSE: NO HURT

## 2024-03-22 ASSESSMENT — PAIN DESCRIPTION - ORIENTATION
ORIENTATION: RIGHT;LEFT;LOWER
ORIENTATION: RIGHT;LEFT;LOWER

## 2024-03-22 ASSESSMENT — PAIN DESCRIPTION - ONSET
ONSET: ON-GOING
ONSET: ON-GOING

## 2024-03-22 ASSESSMENT — PAIN DESCRIPTION - FREQUENCY: FREQUENCY: CONTINUOUS

## 2024-03-22 ASSESSMENT — PAIN DESCRIPTION - PAIN TYPE: TYPE: ACUTE PAIN

## 2024-03-23 LAB
ERYTHROCYTE [DISTWIDTH] IN BLOOD BY AUTOMATED COUNT: 16.6 % (ref 11.6–14.5)
GLUCOSE BLD STRIP.AUTO-MCNC: 111 MG/DL (ref 70–110)
GLUCOSE BLD STRIP.AUTO-MCNC: 113 MG/DL (ref 70–110)
GLUCOSE BLD STRIP.AUTO-MCNC: 119 MG/DL (ref 70–110)
GLUCOSE BLD STRIP.AUTO-MCNC: 129 MG/DL (ref 70–110)
HCT VFR BLD AUTO: 29.5 % (ref 35–45)
HGB BLD-MCNC: 9.3 G/DL (ref 12–16)
MCH RBC QN AUTO: 29.7 PG (ref 24–34)
MCHC RBC AUTO-ENTMCNC: 31.5 G/DL (ref 31–37)
MCV RBC AUTO: 94.2 FL (ref 78–100)
NRBC # BLD: 0.02 K/UL (ref 0–0.01)
NRBC BLD-RTO: 0.3 PER 100 WBC
PLATELET # BLD AUTO: 456 K/UL (ref 135–420)
PMV BLD AUTO: 9 FL (ref 9.2–11.8)
RBC # BLD AUTO: 3.13 M/UL (ref 4.2–5.3)
WBC # BLD AUTO: 7.7 K/UL (ref 4.6–13.2)

## 2024-03-23 PROCEDURE — 6370000000 HC RX 637 (ALT 250 FOR IP): Performed by: INTERNAL MEDICINE

## 2024-03-23 PROCEDURE — 85027 COMPLETE CBC AUTOMATED: CPT

## 2024-03-23 PROCEDURE — 97116 GAIT TRAINING THERAPY: CPT

## 2024-03-23 PROCEDURE — 6370000000 HC RX 637 (ALT 250 FOR IP): Performed by: PHYSICIAN ASSISTANT

## 2024-03-23 PROCEDURE — A4216 STERILE WATER/SALINE, 10 ML: HCPCS | Performed by: INTERNAL MEDICINE

## 2024-03-23 PROCEDURE — C9113 INJ PANTOPRAZOLE SODIUM, VIA: HCPCS | Performed by: INTERNAL MEDICINE

## 2024-03-23 PROCEDURE — 94761 N-INVAS EAR/PLS OXIMETRY MLT: CPT

## 2024-03-23 PROCEDURE — 2580000003 HC RX 258: Performed by: INTERNAL MEDICINE

## 2024-03-23 PROCEDURE — 6370000000 HC RX 637 (ALT 250 FOR IP): Performed by: HOSPITALIST

## 2024-03-23 PROCEDURE — 1100000003 HC PRIVATE W/ TELEMETRY

## 2024-03-23 PROCEDURE — 82962 GLUCOSE BLOOD TEST: CPT

## 2024-03-23 PROCEDURE — 99232 SBSQ HOSP IP/OBS MODERATE 35: CPT | Performed by: HOSPITALIST

## 2024-03-23 PROCEDURE — 36415 COLL VENOUS BLD VENIPUNCTURE: CPT

## 2024-03-23 PROCEDURE — 6360000002 HC RX W HCPCS: Performed by: INTERNAL MEDICINE

## 2024-03-23 PROCEDURE — 97161 PT EVAL LOW COMPLEX 20 MIN: CPT

## 2024-03-23 RX ORDER — GLUCAGON 1 MG
1 KIT INJECTION PRN
Status: DISCONTINUED | OUTPATIENT
Start: 2024-03-23 | End: 2024-03-26 | Stop reason: HOSPADM

## 2024-03-23 RX ORDER — DEXTROSE MONOHYDRATE 100 MG/ML
INJECTION, SOLUTION INTRAVENOUS CONTINUOUS PRN
Status: DISCONTINUED | OUTPATIENT
Start: 2024-03-23 | End: 2024-03-26 | Stop reason: HOSPADM

## 2024-03-23 RX ORDER — POLYETHYLENE GLYCOL 3350 17 G/17G
17 POWDER, FOR SOLUTION ORAL DAILY
Status: DISCONTINUED | OUTPATIENT
Start: 2024-03-24 | End: 2024-03-24

## 2024-03-23 RX ORDER — DOCUSATE SODIUM 100 MG/1
100 CAPSULE, LIQUID FILLED ORAL 2 TIMES DAILY
Status: DISCONTINUED | OUTPATIENT
Start: 2024-03-23 | End: 2024-03-26 | Stop reason: HOSPADM

## 2024-03-23 RX ADMIN — SODIUM CHLORIDE, PRESERVATIVE FREE 10 ML: 5 INJECTION INTRAVENOUS at 09:12

## 2024-03-23 RX ADMIN — TRAMADOL HYDROCHLORIDE 50 MG: 50 TABLET ORAL at 09:13

## 2024-03-23 RX ADMIN — PANTOPRAZOLE SODIUM 40 MG: 40 INJECTION, POWDER, FOR SOLUTION INTRAVENOUS at 08:59

## 2024-03-23 RX ADMIN — LUBIPROSTONE 16 MCG: 8 CAPSULE, GELATIN COATED ORAL at 09:00

## 2024-03-23 RX ADMIN — TRAMADOL HYDROCHLORIDE 50 MG: 50 TABLET ORAL at 14:24

## 2024-03-23 RX ADMIN — LUBIPROSTONE 16 MCG: 8 CAPSULE, GELATIN COATED ORAL at 16:28

## 2024-03-23 RX ADMIN — TRAMADOL HYDROCHLORIDE 50 MG: 50 TABLET ORAL at 20:36

## 2024-03-23 RX ADMIN — SODIUM CHLORIDE, PRESERVATIVE FREE 10 ML: 5 INJECTION INTRAVENOUS at 20:37

## 2024-03-23 RX ADMIN — DOCUSATE SODIUM 100 MG: 100 CAPSULE, LIQUID FILLED ORAL at 20:36

## 2024-03-23 RX ADMIN — INSULIN GLARGINE 6 UNITS: 100 INJECTION, SOLUTION SUBCUTANEOUS at 08:59

## 2024-03-23 RX ADMIN — ATORVASTATIN CALCIUM 40 MG: 40 TABLET, FILM COATED ORAL at 21:00

## 2024-03-23 RX ADMIN — MELATONIN 3 MG ORAL TABLET 3 MG: 3 TABLET ORAL at 20:36

## 2024-03-23 ASSESSMENT — PAIN DESCRIPTION - ORIENTATION
ORIENTATION: INNER
ORIENTATION: RIGHT;LEFT

## 2024-03-23 ASSESSMENT — PAIN SCALES - GENERAL
PAINLEVEL_OUTOF10: 0
PAINLEVEL_OUTOF10: 0
PAINLEVEL_OUTOF10: 10
PAINLEVEL_OUTOF10: 8
PAINLEVEL_OUTOF10: 0
PAINLEVEL_OUTOF10: 6
PAINLEVEL_OUTOF10: 0
PAINLEVEL_OUTOF10: 0
PAINLEVEL_OUTOF10: 10
PAINLEVEL_OUTOF10: 0

## 2024-03-23 ASSESSMENT — PAIN SCALES - WONG BAKER
WONGBAKER_NUMERICALRESPONSE: NO HURT

## 2024-03-23 ASSESSMENT — PAIN DESCRIPTION - LOCATION
LOCATION: GENERALIZED
LOCATION: LEG;BACK
LOCATION: LEG;BACK
LOCATION: BACK

## 2024-03-23 ASSESSMENT — PAIN DESCRIPTION - DESCRIPTORS
DESCRIPTORS: ACHING

## 2024-03-23 ASSESSMENT — PAIN - FUNCTIONAL ASSESSMENT: PAIN_FUNCTIONAL_ASSESSMENT: ACTIVITIES ARE NOT PREVENTED

## 2024-03-23 ASSESSMENT — PAIN DESCRIPTION - ONSET: ONSET: ON-GOING

## 2024-03-24 LAB
ANION GAP SERPL CALC-SCNC: 8 MMOL/L (ref 3–18)
BUN SERPL-MCNC: 32 MG/DL (ref 7–18)
BUN/CREAT SERPL: 8 (ref 12–20)
CALCIUM SERPL-MCNC: 9.2 MG/DL (ref 8.5–10.1)
CHLORIDE SERPL-SCNC: 98 MMOL/L (ref 100–111)
CO2 SERPL-SCNC: 29 MMOL/L (ref 21–32)
CREAT SERPL-MCNC: 4.13 MG/DL (ref 0.6–1.3)
ERYTHROCYTE [DISTWIDTH] IN BLOOD BY AUTOMATED COUNT: 16 % (ref 11.6–14.5)
GLUCOSE BLD STRIP.AUTO-MCNC: 101 MG/DL (ref 70–110)
GLUCOSE BLD STRIP.AUTO-MCNC: 130 MG/DL (ref 70–110)
GLUCOSE BLD STRIP.AUTO-MCNC: 188 MG/DL (ref 70–110)
GLUCOSE BLD STRIP.AUTO-MCNC: 95 MG/DL (ref 70–110)
GLUCOSE SERPL-MCNC: 125 MG/DL (ref 74–99)
HCT VFR BLD AUTO: 26.6 % (ref 35–45)
HGB BLD-MCNC: 8.4 G/DL (ref 12–16)
MCH RBC QN AUTO: 29.6 PG (ref 24–34)
MCHC RBC AUTO-ENTMCNC: 31.6 G/DL (ref 31–37)
MCV RBC AUTO: 93.7 FL (ref 78–100)
NRBC # BLD: 0 K/UL (ref 0–0.01)
NRBC BLD-RTO: 0 PER 100 WBC
PLATELET # BLD AUTO: 369 K/UL (ref 135–420)
PMV BLD AUTO: 9 FL (ref 9.2–11.8)
POTASSIUM SERPL-SCNC: 3.9 MMOL/L (ref 3.5–5.5)
RBC # BLD AUTO: 2.84 M/UL (ref 4.2–5.3)
SODIUM SERPL-SCNC: 135 MMOL/L (ref 136–145)
WBC # BLD AUTO: 7.2 K/UL (ref 4.6–13.2)

## 2024-03-24 PROCEDURE — 6370000000 HC RX 637 (ALT 250 FOR IP): Performed by: PHYSICIAN ASSISTANT

## 2024-03-24 PROCEDURE — 1100000003 HC PRIVATE W/ TELEMETRY

## 2024-03-24 PROCEDURE — 6370000000 HC RX 637 (ALT 250 FOR IP): Performed by: INTERNAL MEDICINE

## 2024-03-24 PROCEDURE — 85027 COMPLETE CBC AUTOMATED: CPT

## 2024-03-24 PROCEDURE — 97530 THERAPEUTIC ACTIVITIES: CPT

## 2024-03-24 PROCEDURE — 80048 BASIC METABOLIC PNL TOTAL CA: CPT

## 2024-03-24 PROCEDURE — 2580000003 HC RX 258: Performed by: INTERNAL MEDICINE

## 2024-03-24 PROCEDURE — 6360000002 HC RX W HCPCS: Performed by: INTERNAL MEDICINE

## 2024-03-24 PROCEDURE — 6370000000 HC RX 637 (ALT 250 FOR IP): Performed by: HOSPITALIST

## 2024-03-24 PROCEDURE — 97166 OT EVAL MOD COMPLEX 45 MIN: CPT

## 2024-03-24 PROCEDURE — 94761 N-INVAS EAR/PLS OXIMETRY MLT: CPT

## 2024-03-24 PROCEDURE — 36415 COLL VENOUS BLD VENIPUNCTURE: CPT

## 2024-03-24 PROCEDURE — A4216 STERILE WATER/SALINE, 10 ML: HCPCS | Performed by: INTERNAL MEDICINE

## 2024-03-24 PROCEDURE — C9113 INJ PANTOPRAZOLE SODIUM, VIA: HCPCS | Performed by: INTERNAL MEDICINE

## 2024-03-24 PROCEDURE — 82962 GLUCOSE BLOOD TEST: CPT

## 2024-03-24 RX ORDER — GABAPENTIN 100 MG/1
100 CAPSULE ORAL 2 TIMES DAILY
Status: DISCONTINUED | OUTPATIENT
Start: 2024-03-24 | End: 2024-03-24

## 2024-03-24 RX ORDER — BISACODYL 10 MG
10 SUPPOSITORY, RECTAL RECTAL
Status: COMPLETED | OUTPATIENT
Start: 2024-03-24 | End: 2024-03-24

## 2024-03-24 RX ORDER — POLYETHYLENE GLYCOL 3350 17 G/17G
17 POWDER, FOR SOLUTION ORAL 2 TIMES DAILY
Status: DISCONTINUED | OUTPATIENT
Start: 2024-03-24 | End: 2024-03-25

## 2024-03-24 RX ORDER — TRAMADOL HYDROCHLORIDE 50 MG/1
50 TABLET ORAL EVERY 12 HOURS PRN
Status: DISCONTINUED | OUTPATIENT
Start: 2024-03-24 | End: 2024-03-25

## 2024-03-24 RX ORDER — GABAPENTIN 300 MG/1
300 CAPSULE ORAL NIGHTLY
Status: DISCONTINUED | OUTPATIENT
Start: 2024-03-24 | End: 2024-03-26 | Stop reason: HOSPADM

## 2024-03-24 RX ADMIN — PANTOPRAZOLE SODIUM 40 MG: 40 INJECTION, POWDER, FOR SOLUTION INTRAVENOUS at 09:04

## 2024-03-24 RX ADMIN — SODIUM CHLORIDE, PRESERVATIVE FREE 10 ML: 5 INJECTION INTRAVENOUS at 21:42

## 2024-03-24 RX ADMIN — LUBIPROSTONE 16 MCG: 8 CAPSULE, GELATIN COATED ORAL at 09:03

## 2024-03-24 RX ADMIN — ATORVASTATIN CALCIUM 40 MG: 40 TABLET, FILM COATED ORAL at 21:42

## 2024-03-24 RX ADMIN — DOCUSATE SODIUM 100 MG: 100 CAPSULE, LIQUID FILLED ORAL at 09:03

## 2024-03-24 RX ADMIN — GABAPENTIN 300 MG: 300 CAPSULE ORAL at 21:00

## 2024-03-24 RX ADMIN — SODIUM CHLORIDE, PRESERVATIVE FREE 10 ML: 5 INJECTION INTRAVENOUS at 09:05

## 2024-03-24 RX ADMIN — DOCUSATE SODIUM 100 MG: 100 CAPSULE, LIQUID FILLED ORAL at 21:40

## 2024-03-24 RX ADMIN — TRAMADOL HYDROCHLORIDE 50 MG: 50 TABLET ORAL at 04:14

## 2024-03-24 RX ADMIN — MELATONIN 3 MG ORAL TABLET 3 MG: 3 TABLET ORAL at 21:42

## 2024-03-24 RX ADMIN — TRAMADOL HYDROCHLORIDE 50 MG: 50 TABLET ORAL at 16:19

## 2024-03-24 RX ADMIN — INSULIN GLARGINE 6 UNITS: 100 INJECTION, SOLUTION SUBCUTANEOUS at 09:04

## 2024-03-24 RX ADMIN — POLYETHYLENE GLYCOL 3350 17 G: 17 POWDER, FOR SOLUTION ORAL at 21:41

## 2024-03-24 RX ADMIN — TRAMADOL HYDROCHLORIDE 50 MG: 50 TABLET ORAL at 10:26

## 2024-03-24 RX ADMIN — POLYETHYLENE GLYCOL 3350 17 G: 17 POWDER, FOR SOLUTION ORAL at 09:03

## 2024-03-24 RX ADMIN — LUBIPROSTONE 16 MCG: 8 CAPSULE, GELATIN COATED ORAL at 15:50

## 2024-03-24 RX ADMIN — BISACODYL 10 MG: 10 SUPPOSITORY RECTAL at 12:13

## 2024-03-24 ASSESSMENT — PAIN DESCRIPTION - LOCATION
LOCATION: GENERALIZED

## 2024-03-24 ASSESSMENT — PAIN SCALES - WONG BAKER
WONGBAKER_NUMERICALRESPONSE: NO HURT

## 2024-03-24 ASSESSMENT — PAIN SCALES - GENERAL
PAINLEVEL_OUTOF10: 10
PAINLEVEL_OUTOF10: 0
PAINLEVEL_OUTOF10: 7
PAINLEVEL_OUTOF10: 7
PAINLEVEL_OUTOF10: 0
PAINLEVEL_OUTOF10: 0
PAINLEVEL_OUTOF10: 9
PAINLEVEL_OUTOF10: 0

## 2024-03-24 ASSESSMENT — PAIN DESCRIPTION - ORIENTATION
ORIENTATION: INNER
ORIENTATION: INNER

## 2024-03-24 ASSESSMENT — PAIN DESCRIPTION - DESCRIPTORS
DESCRIPTORS: ACHING

## 2024-03-24 ASSESSMENT — PAIN - FUNCTIONAL ASSESSMENT: PAIN_FUNCTIONAL_ASSESSMENT: ACTIVITIES ARE NOT PREVENTED

## 2024-03-24 ASSESSMENT — PAIN DESCRIPTION - FREQUENCY: FREQUENCY: CONTINUOUS

## 2024-03-24 ASSESSMENT — PAIN DESCRIPTION - PAIN TYPE: TYPE: CHRONIC PAIN

## 2024-03-25 ENCOUNTER — APPOINTMENT (OUTPATIENT)
Facility: HOSPITAL | Age: 74
DRG: 377 | End: 2024-03-25
Payer: MEDICARE

## 2024-03-25 LAB
ANION GAP SERPL CALC-SCNC: 8 MMOL/L (ref 3–18)
BUN SERPL-MCNC: 35 MG/DL (ref 7–18)
BUN/CREAT SERPL: 8 (ref 12–20)
CALCIUM SERPL-MCNC: 8.9 MG/DL (ref 8.5–10.1)
CHLORIDE SERPL-SCNC: 99 MMOL/L (ref 100–111)
CO2 SERPL-SCNC: 29 MMOL/L (ref 21–32)
CREAT SERPL-MCNC: 4.22 MG/DL (ref 0.6–1.3)
ERYTHROCYTE [DISTWIDTH] IN BLOOD BY AUTOMATED COUNT: 15.9 % (ref 11.6–14.5)
GLUCOSE BLD STRIP.AUTO-MCNC: 129 MG/DL (ref 70–110)
GLUCOSE BLD STRIP.AUTO-MCNC: 160 MG/DL (ref 70–110)
GLUCOSE BLD STRIP.AUTO-MCNC: 183 MG/DL (ref 70–110)
GLUCOSE BLD STRIP.AUTO-MCNC: 188 MG/DL (ref 70–110)
GLUCOSE SERPL-MCNC: 103 MG/DL (ref 74–99)
HCT VFR BLD AUTO: 24.4 % (ref 35–45)
HCT VFR BLD AUTO: 28.2 % (ref 35–45)
HGB BLD-MCNC: 7.8 G/DL (ref 12–16)
HGB BLD-MCNC: 8.8 G/DL (ref 12–16)
INR PPP: 1 (ref 0.9–1.1)
MCH RBC QN AUTO: 29.9 PG (ref 24–34)
MCHC RBC AUTO-ENTMCNC: 32 G/DL (ref 31–37)
MCV RBC AUTO: 93.5 FL (ref 78–100)
NRBC # BLD: 0 K/UL (ref 0–0.01)
NRBC BLD-RTO: 0 PER 100 WBC
PLATELET # BLD AUTO: 286 K/UL (ref 135–420)
PMV BLD AUTO: 10.1 FL (ref 9.2–11.8)
POTASSIUM SERPL-SCNC: 4.2 MMOL/L (ref 3.5–5.5)
PROTHROMBIN TIME: 12.8 SEC (ref 11.9–14.7)
RBC # BLD AUTO: 2.61 M/UL (ref 4.2–5.3)
SODIUM SERPL-SCNC: 136 MMOL/L (ref 136–145)
T4 FREE SERPL-MCNC: 1.3 NG/DL (ref 0.7–1.5)
TSH SERPL DL<=0.05 MIU/L-ACNC: 1.31 UIU/ML (ref 0.36–3.74)
WBC # BLD AUTO: 6.4 K/UL (ref 4.6–13.2)

## 2024-03-25 PROCEDURE — 6370000000 HC RX 637 (ALT 250 FOR IP): Performed by: HOSPITALIST

## 2024-03-25 PROCEDURE — A4216 STERILE WATER/SALINE, 10 ML: HCPCS | Performed by: INTERNAL MEDICINE

## 2024-03-25 PROCEDURE — 2580000003 HC RX 258: Performed by: INTERNAL MEDICINE

## 2024-03-25 PROCEDURE — 93005 ELECTROCARDIOGRAM TRACING: CPT | Performed by: HOSPITALIST

## 2024-03-25 PROCEDURE — 1100000003 HC PRIVATE W/ TELEMETRY

## 2024-03-25 PROCEDURE — 6370000000 HC RX 637 (ALT 250 FOR IP): Performed by: INTERNAL MEDICINE

## 2024-03-25 PROCEDURE — 85027 COMPLETE CBC AUTOMATED: CPT

## 2024-03-25 PROCEDURE — 85610 PROTHROMBIN TIME: CPT

## 2024-03-25 PROCEDURE — 84443 ASSAY THYROID STIM HORMONE: CPT

## 2024-03-25 PROCEDURE — 99232 SBSQ HOSP IP/OBS MODERATE 35: CPT | Performed by: HOSPITALIST

## 2024-03-25 PROCEDURE — 90935 HEMODIALYSIS ONE EVALUATION: CPT

## 2024-03-25 PROCEDURE — 36415 COLL VENOUS BLD VENIPUNCTURE: CPT

## 2024-03-25 PROCEDURE — 84439 ASSAY OF FREE THYROXINE: CPT

## 2024-03-25 PROCEDURE — 80048 BASIC METABOLIC PNL TOTAL CA: CPT

## 2024-03-25 PROCEDURE — 93971 EXTREMITY STUDY: CPT

## 2024-03-25 PROCEDURE — 6360000002 HC RX W HCPCS: Performed by: INTERNAL MEDICINE

## 2024-03-25 PROCEDURE — 85018 HEMOGLOBIN: CPT

## 2024-03-25 PROCEDURE — C9113 INJ PANTOPRAZOLE SODIUM, VIA: HCPCS | Performed by: INTERNAL MEDICINE

## 2024-03-25 PROCEDURE — 6370000000 HC RX 637 (ALT 250 FOR IP): Performed by: PHYSICIAN ASSISTANT

## 2024-03-25 PROCEDURE — 6370000000 HC RX 637 (ALT 250 FOR IP): Performed by: STUDENT IN AN ORGANIZED HEALTH CARE EDUCATION/TRAINING PROGRAM

## 2024-03-25 PROCEDURE — 82962 GLUCOSE BLOOD TEST: CPT

## 2024-03-25 PROCEDURE — 85014 HEMATOCRIT: CPT

## 2024-03-25 RX ORDER — BISACODYL 10 MG
10 SUPPOSITORY, RECTAL RECTAL ONCE
Status: COMPLETED | OUTPATIENT
Start: 2024-03-25 | End: 2024-03-25

## 2024-03-25 RX ORDER — TRAMADOL HYDROCHLORIDE 50 MG/1
50 TABLET ORAL EVERY 8 HOURS PRN
Status: DISCONTINUED | OUTPATIENT
Start: 2024-03-25 | End: 2024-03-26 | Stop reason: HOSPADM

## 2024-03-25 RX ORDER — POLYETHYLENE GLYCOL 3350 17 G/17G
17 POWDER, FOR SOLUTION ORAL 3 TIMES DAILY
Status: DISCONTINUED | OUTPATIENT
Start: 2024-03-25 | End: 2024-03-26 | Stop reason: HOSPADM

## 2024-03-25 RX ADMIN — MELATONIN 3 MG ORAL TABLET 3 MG: 3 TABLET ORAL at 21:27

## 2024-03-25 RX ADMIN — DOCUSATE SODIUM 100 MG: 100 CAPSULE, LIQUID FILLED ORAL at 21:28

## 2024-03-25 RX ADMIN — TRAMADOL HYDROCHLORIDE 50 MG: 50 TABLET ORAL at 04:30

## 2024-03-25 RX ADMIN — TRAMADOL HYDROCHLORIDE 50 MG: 50 TABLET ORAL at 21:27

## 2024-03-25 RX ADMIN — LUBIPROSTONE 16 MCG: 8 CAPSULE, GELATIN COATED ORAL at 08:34

## 2024-03-25 RX ADMIN — SODIUM CHLORIDE, PRESERVATIVE FREE 10 ML: 5 INJECTION INTRAVENOUS at 08:34

## 2024-03-25 RX ADMIN — BISACODYL 10 MG: 10 SUPPOSITORY RECTAL at 13:01

## 2024-03-25 RX ADMIN — PANTOPRAZOLE SODIUM 40 MG: 40 INJECTION, POWDER, FOR SOLUTION INTRAVENOUS at 08:35

## 2024-03-25 RX ADMIN — EPOETIN ALFA-EPBX 5000 UNITS: 3000 INJECTION, SOLUTION INTRAVENOUS; SUBCUTANEOUS at 16:31

## 2024-03-25 RX ADMIN — POLYETHYLENE GLYCOL 3350 17 G: 17 POWDER, FOR SOLUTION ORAL at 08:35

## 2024-03-25 RX ADMIN — ALLOPURINOL 100 MG: 100 TABLET ORAL at 21:28

## 2024-03-25 RX ADMIN — INSULIN GLARGINE 6 UNITS: 100 INJECTION, SOLUTION SUBCUTANEOUS at 08:35

## 2024-03-25 RX ADMIN — DOCUSATE SODIUM 100 MG: 100 CAPSULE, LIQUID FILLED ORAL at 13:01

## 2024-03-25 RX ADMIN — ACETAMINOPHEN 325MG 650 MG: 325 TABLET ORAL at 09:49

## 2024-03-25 RX ADMIN — SODIUM CHLORIDE, PRESERVATIVE FREE 10 ML: 5 INJECTION INTRAVENOUS at 21:28

## 2024-03-25 RX ADMIN — TRAMADOL HYDROCHLORIDE 50 MG: 50 TABLET ORAL at 13:02

## 2024-03-25 RX ADMIN — POLYETHYLENE GLYCOL 3350 17 G: 17 POWDER, FOR SOLUTION ORAL at 16:31

## 2024-03-25 RX ADMIN — LUBIPROSTONE 16 MCG: 8 CAPSULE, GELATIN COATED ORAL at 16:31

## 2024-03-25 RX ADMIN — ATORVASTATIN CALCIUM 40 MG: 40 TABLET, FILM COATED ORAL at 21:28

## 2024-03-25 RX ADMIN — GABAPENTIN 300 MG: 300 CAPSULE ORAL at 21:28

## 2024-03-25 RX ADMIN — POLYETHYLENE GLYCOL 3350 17 G: 17 POWDER, FOR SOLUTION ORAL at 21:28

## 2024-03-25 ASSESSMENT — PAIN DESCRIPTION - DESCRIPTORS
DESCRIPTORS: ACHING
DESCRIPTORS: ACHING;DISCOMFORT
DESCRIPTORS: ACHING
DESCRIPTORS: ACHING;SORE;TENDER
DESCRIPTORS: ACHING;TENDER
DESCRIPTORS: ACHING

## 2024-03-25 ASSESSMENT — PAIN DESCRIPTION - LOCATION
LOCATION: KNEE
LOCATION: ARM
LOCATION: BACK;KNEE
LOCATION: KNEE;BACK
LOCATION: KNEE
LOCATION: GENERALIZED

## 2024-03-25 ASSESSMENT — PAIN SCALES - GENERAL
PAINLEVEL_OUTOF10: 0
PAINLEVEL_OUTOF10: 7
PAINLEVEL_OUTOF10: 10
PAINLEVEL_OUTOF10: 10
PAINLEVEL_OUTOF10: 6
PAINLEVEL_OUTOF10: 10
PAINLEVEL_OUTOF10: 7
PAINLEVEL_OUTOF10: 0
PAINLEVEL_OUTOF10: 10
PAINLEVEL_OUTOF10: 6
PAINLEVEL_OUTOF10: 5
PAINLEVEL_OUTOF10: 10
PAINLEVEL_OUTOF10: 0
PAINLEVEL_OUTOF10: 6

## 2024-03-25 ASSESSMENT — PAIN SCALES - WONG BAKER
WONGBAKER_NUMERICALRESPONSE: NO HURT

## 2024-03-25 ASSESSMENT — PAIN DESCRIPTION - ONSET
ONSET: ON-GOING
ONSET: ON-GOING

## 2024-03-25 ASSESSMENT — PAIN DESCRIPTION - PAIN TYPE
TYPE: ACUTE PAIN
TYPE: ACUTE PAIN

## 2024-03-25 ASSESSMENT — PAIN DESCRIPTION - FREQUENCY
FREQUENCY: CONTINUOUS
FREQUENCY: CONTINUOUS

## 2024-03-25 ASSESSMENT — PAIN DESCRIPTION - ORIENTATION
ORIENTATION: LEFT
ORIENTATION: LEFT
ORIENTATION: LEFT;UPPER

## 2024-03-25 NOTE — DIALYSIS
Received pre HD report from  RICCI Mireles RN.      0851 Patient arrived to unit    Pt in bed, A+O x4, no s/s of distress noted.      Accessed AVF Left upper arm w/16G needle w/o difficulty.    Tx initiated at 0909.      AVF flowing with ease.  For hemodynamic stability UF goal 2500 ml.      Offered assistance with repositioning every 2 hours.  Vascular access visible at all times throughout entire duration of treatment and line connections remain intact throughout entire duration of treatment.     @1000, BFR lowered d/t high venous pressure.  @1021: New 16G Venous needle placed due to previous needle pressure of >300.  Pt tolerated well.  @1043 BFR returned to 350  @1144, BFR lowered again due to high venous pressure of 280.    Tx completed at 1213, tolerated well 2L removed.  De-accessed per protocol.    Clot time 5 minutes for arterial, and 5 minutes for venous.      Unit nurse RICCI Mireles RN given report.

## 2024-03-26 VITALS
HEART RATE: 94 BPM | TEMPERATURE: 99.6 F | BODY MASS INDEX: 31.8 KG/M2 | DIASTOLIC BLOOD PRESSURE: 67 MMHG | WEIGHT: 186.3 LBS | OXYGEN SATURATION: 96 % | RESPIRATION RATE: 18 BRPM | SYSTOLIC BLOOD PRESSURE: 113 MMHG | HEIGHT: 64 IN

## 2024-03-26 LAB
ANION GAP SERPL CALC-SCNC: 6 MMOL/L (ref 3–18)
BASOPHILS # BLD: 0 K/UL (ref 0–0.1)
BASOPHILS NFR BLD: 0 % (ref 0–2)
BUN SERPL-MCNC: 25 MG/DL (ref 7–18)
BUN/CREAT SERPL: 7 (ref 12–20)
CALCIUM SERPL-MCNC: 8.9 MG/DL (ref 8.5–10.1)
CHLORIDE SERPL-SCNC: 101 MMOL/L (ref 100–111)
CO2 SERPL-SCNC: 32 MMOL/L (ref 21–32)
CREAT SERPL-MCNC: 3.77 MG/DL (ref 0.6–1.3)
DIFFERENTIAL METHOD BLD: ABNORMAL
ECHO BSA: 1.93 M2
EKG ATRIAL RATE: 106 BPM
EKG DIAGNOSIS: NORMAL
EKG P AXIS: 71 DEGREES
EKG P-R INTERVAL: 184 MS
EKG Q-T INTERVAL: 348 MS
EKG QRS DURATION: 90 MS
EKG QTC CALCULATION (BAZETT): 462 MS
EKG R AXIS: 52 DEGREES
EKG T AXIS: 71 DEGREES
EKG VENTRICULAR RATE: 106 BPM
EOSINOPHIL # BLD: 0.1 K/UL (ref 0–0.4)
EOSINOPHIL NFR BLD: 1 % (ref 0–5)
ERYTHROCYTE [DISTWIDTH] IN BLOOD BY AUTOMATED COUNT: 16.1 % (ref 11.6–14.5)
GLUCOSE BLD STRIP.AUTO-MCNC: 127 MG/DL (ref 70–110)
GLUCOSE BLD STRIP.AUTO-MCNC: 150 MG/DL (ref 70–110)
GLUCOSE SERPL-MCNC: 140 MG/DL (ref 74–99)
HCT VFR BLD AUTO: 25.9 % (ref 35–45)
HGB BLD-MCNC: 8 G/DL (ref 12–16)
IMM GRANULOCYTES # BLD AUTO: 0.1 K/UL (ref 0–0.04)
IMM GRANULOCYTES NFR BLD AUTO: 2 % (ref 0–0.5)
LYMPHOCYTES # BLD: 1.7 K/UL (ref 0.9–3.6)
LYMPHOCYTES NFR BLD: 24 % (ref 21–52)
MCH RBC QN AUTO: 29.5 PG (ref 24–34)
MCHC RBC AUTO-ENTMCNC: 30.9 G/DL (ref 31–37)
MCV RBC AUTO: 95.6 FL (ref 78–100)
MONOCYTES # BLD: 0.9 K/UL (ref 0.05–1.2)
MONOCYTES NFR BLD: 13 % (ref 3–10)
NEUTS SEG # BLD: 4.3 K/UL (ref 1.8–8)
NEUTS SEG NFR BLD: 60 % (ref 40–73)
NRBC # BLD: 0 K/UL (ref 0–0.01)
NRBC BLD-RTO: 0 PER 100 WBC
PLATELET # BLD AUTO: 367 K/UL (ref 135–420)
PMV BLD AUTO: 9 FL (ref 9.2–11.8)
POTASSIUM SERPL-SCNC: 4.2 MMOL/L (ref 3.5–5.5)
RBC # BLD AUTO: 2.71 M/UL (ref 4.2–5.3)
SODIUM SERPL-SCNC: 139 MMOL/L (ref 136–145)
WBC # BLD AUTO: 7.1 K/UL (ref 4.6–13.2)

## 2024-03-26 PROCEDURE — 97116 GAIT TRAINING THERAPY: CPT

## 2024-03-26 PROCEDURE — 6370000000 HC RX 637 (ALT 250 FOR IP): Performed by: INTERNAL MEDICINE

## 2024-03-26 PROCEDURE — 2580000003 HC RX 258: Performed by: INTERNAL MEDICINE

## 2024-03-26 PROCEDURE — 93971 EXTREMITY STUDY: CPT | Performed by: INTERNAL MEDICINE

## 2024-03-26 PROCEDURE — 97530 THERAPEUTIC ACTIVITIES: CPT

## 2024-03-26 PROCEDURE — 80048 BASIC METABOLIC PNL TOTAL CA: CPT

## 2024-03-26 PROCEDURE — C9113 INJ PANTOPRAZOLE SODIUM, VIA: HCPCS | Performed by: INTERNAL MEDICINE

## 2024-03-26 PROCEDURE — 94761 N-INVAS EAR/PLS OXIMETRY MLT: CPT

## 2024-03-26 PROCEDURE — A4216 STERILE WATER/SALINE, 10 ML: HCPCS | Performed by: INTERNAL MEDICINE

## 2024-03-26 PROCEDURE — 85025 COMPLETE CBC W/AUTO DIFF WBC: CPT

## 2024-03-26 PROCEDURE — 6370000000 HC RX 637 (ALT 250 FOR IP): Performed by: PHYSICIAN ASSISTANT

## 2024-03-26 PROCEDURE — 82962 GLUCOSE BLOOD TEST: CPT

## 2024-03-26 PROCEDURE — 6370000000 HC RX 637 (ALT 250 FOR IP): Performed by: HOSPITALIST

## 2024-03-26 PROCEDURE — 6360000002 HC RX W HCPCS: Performed by: INTERNAL MEDICINE

## 2024-03-26 PROCEDURE — 36415 COLL VENOUS BLD VENIPUNCTURE: CPT

## 2024-03-26 PROCEDURE — 6370000000 HC RX 637 (ALT 250 FOR IP): Performed by: STUDENT IN AN ORGANIZED HEALTH CARE EDUCATION/TRAINING PROGRAM

## 2024-03-26 RX ORDER — POLYETHYLENE GLYCOL 3350 17 G/17G
17 POWDER, FOR SOLUTION ORAL 2 TIMES DAILY
Qty: 1020 G | Refills: 0 | Status: SHIPPED | OUTPATIENT
Start: 2024-03-26 | End: 2024-04-25

## 2024-03-26 RX ORDER — GABAPENTIN 300 MG/1
300 CAPSULE ORAL EVERY EVENING
Qty: 30 CAPSULE | Refills: 0 | Status: SHIPPED | OUTPATIENT
Start: 2024-03-26 | End: 2024-04-25

## 2024-03-26 RX ORDER — OXYCODONE HYDROCHLORIDE AND ACETAMINOPHEN 5; 325 MG/1; MG/1
1 TABLET ORAL EVERY 8 HOURS PRN
Status: DISCONTINUED | OUTPATIENT
Start: 2024-03-26 | End: 2024-03-26 | Stop reason: HOSPADM

## 2024-03-26 RX ORDER — OXYCODONE HYDROCHLORIDE AND ACETAMINOPHEN 5; 325 MG/1; MG/1
1 TABLET ORAL EVERY 8 HOURS PRN
Qty: 5 TABLET | Refills: 0 | Status: SHIPPED | OUTPATIENT
Start: 2024-03-26 | End: 2024-03-29

## 2024-03-26 RX ORDER — PANTOPRAZOLE SODIUM 40 MG/1
40 TABLET, DELAYED RELEASE ORAL DAILY
Qty: 30 TABLET | Refills: 0 | Status: SHIPPED | OUTPATIENT
Start: 2024-03-26

## 2024-03-26 RX ADMIN — DOCUSATE SODIUM 100 MG: 100 CAPSULE, LIQUID FILLED ORAL at 09:15

## 2024-03-26 RX ADMIN — POLYETHYLENE GLYCOL 3350 17 G: 17 POWDER, FOR SOLUTION ORAL at 09:15

## 2024-03-26 RX ADMIN — TRAMADOL HYDROCHLORIDE 50 MG: 50 TABLET ORAL at 07:53

## 2024-03-26 RX ADMIN — OXYCODONE AND ACETAMINOPHEN 1 TABLET: 5; 325 TABLET ORAL at 13:40

## 2024-03-26 RX ADMIN — LUBIPROSTONE 16 MCG: 8 CAPSULE, GELATIN COATED ORAL at 09:15

## 2024-03-26 RX ADMIN — PANTOPRAZOLE SODIUM 40 MG: 40 INJECTION, POWDER, FOR SOLUTION INTRAVENOUS at 09:13

## 2024-03-26 RX ADMIN — SODIUM CHLORIDE, PRESERVATIVE FREE 10 ML: 5 INJECTION INTRAVENOUS at 09:16

## 2024-03-26 RX ADMIN — INSULIN GLARGINE 6 UNITS: 100 INJECTION, SOLUTION SUBCUTANEOUS at 08:36

## 2024-03-26 ASSESSMENT — PAIN SCALES - GENERAL
PAINLEVEL_OUTOF10: 8
PAINLEVEL_OUTOF10: 10
PAINLEVEL_OUTOF10: 10
PAINLEVEL_OUTOF10: 9

## 2024-03-26 ASSESSMENT — PAIN DESCRIPTION - DESCRIPTORS: DESCRIPTORS: SHARP

## 2024-03-26 ASSESSMENT — PAIN DESCRIPTION - LOCATION
LOCATION: GENERALIZED
LOCATION: GENERALIZED

## 2024-03-26 ASSESSMENT — PAIN DESCRIPTION - PAIN TYPE
TYPE: CHRONIC PAIN

## 2024-03-26 NOTE — DISCHARGE SUMMARY
Discharge Summary    Patient: Christy Donahue               Sex: female          DOA: 3/19/2024         YOB: 1950      Age:  73 y.o.        LOS:  LOS: 6 days                Admit Date: 3/19/2024    Discharge Date: 3/26/2024    Admission Diagnoses: ESRD (end stage renal disease) (HCC) [N18.6]  Anemia, blood loss [D50.0]  UGIB (upper gastrointestinal bleed) [K92.2]  Gastrointestinal hemorrhage with melena [K92.1]  Deep vein thrombosis (DVT) of proximal lower extremity, unspecified chronicity, unspecified laterality ([facility]) [I82.4Y9]    Reason for Admission:     73 y.o. female with PMH of end-stage renal disease on hemodialysis , chronic anemia, history of DVT left popliteal and posterior tibial vein on Eliquis diabetes mellitus type 2, chronic pain, history of ESBL and intraoperative abdominal fluid cultures, left hip pain, wheelchair-bound, recently discharged on 3/9/2024,h/o Diverticulitis , s/p colostomy then  She subsequently had the colostomy takedown on June 30, 2023 by Dr. Ga via exploratory laparotomy with adheseolysis.      Patient is now being admitted with a history of fall from wheelchair and pain EMS brought patient to the emergency room where her CBC showed hemoglobin of 5.8 and 18.7 also history of melena about 2 to 3 days.Stool occult positive per ED . GI consulted from ED     Discharge Condition:  good    Hospital Course:  Anemia with heme positive stools -appreciate GI input, patient recently had EGD and colonoscopy done at [facility].  Continue to monitor.  Conservative management per GI note.  Patient transfused 1 unit PRBC, continue to monitor H&H.  History of DVT on Eliquis as outpatient, currently on hold.  Repeat duplex, follow vascular recs regarding anticoagulation.  Unclear if patient has filter.  Chronic bilateral lower extremity pain-continue tramadol  History of COPD-stable  History of type 2 diabetes-continue insulin sliding scale, monitor blood sugars.  History of MICHAEL on CPAP

## 2024-03-26 NOTE — CARE COORDINATION
Patient has transitional care follow up with Pcp MACARIO Mahan on 4/09/2024 at 2:00 pm. Vascular follow up on 4/25/2024 at 10:00 am.  
SW spoke with patient at bedside and patient will continue BS. Per Kylee Aceves @ [facility] EXT [phone], patient services with [facility] will continue once patient is discharge.    Yovana Mims BSW   Case Management    
Follow Up Transport Family   Condition of Participation: Discharge Planning   The Patient and/or Patient Representative was provided with a Choice of Provider? Patient     Yovana Mims BSW   Case Management

## 2024-03-26 NOTE — PROGRESS NOTES
[email or web address]  [phone]    Gastroenterology Progress Note    Impression:  1. GI bleed - low grade, suspect from known GI AVMs  2. Submucosal gastric mass - under eval by [facility] not a bleeding source  3. Acute on chronic anemia - stable   4. Dialysis related nausea   5. COPD on O2 at home   6. Hx of colon resection for diverticular disease   7. Constipation     Plan:  1. No GI interventions planned unless significant overt GI bleeding occurs in light of recent colonoscopy and EGD. If she does develop overt GI bleeding, would consider CTA vs NM bleeding scan as initial evaluation for source of anemia.  2. Monitor H/H, transfuse if Hgb <7.  3. IV PPI BID  4. Bowel regimen increased to glycolax TID  5. Continue medical management per primary.     Chief Complaint: anemia      Subjective:  Notes hard rock like BM last night despite BID glycolax and dulcolax suppository. Mild lower abd pain. Eating well.     ROS: Denies any fevers, chills, nausea, vomiting.       General: well nourished, no acute distress  Eyes: conjunctiva normal, EOM normal  Pulmonary: effort normal  Abdominal: non-distended, active bowel sounds, soft, mild lower abd TTP, non-acute  Patient Active Problem List   Diagnosis    Environmental allergies    MICHAEL on CPAP    Chronic pain syndrome    Osteoarthritis of both knees    Hypotension    Coronary artery disease involving native coronary artery of native heart without angina pectoris    Acute pulmonary edema (HCC)    Coronary artery disease involving native coronary artery with unstable angina pectoris (HCC)    Pulmonary edema cardiac cause (HCC)    Hyperkalemia    Chronic obstructive pulmonary disease (HCC)    Bilateral shoulder pain    Respiratory failure requiring intubation (HCC)    Flash pulmonary edema (HCC)    Elevated d-dimer    Syncope    CAD (coronary artery disease)    Compliance with medication regimen    Chronic pain of both knees    Ischemic cardiomyopathy    Type 2 diabetes 
      [email or web address]  [phone]    Gastroenterology Progress Note    Impression:  1. GI bleed - low grade, suspect from known GI AVMs  - History of recent GI bleed, admitted to [facility] 1/2024.   - 1/2024 EGD w/ Dr. Flores - normal esophagus, medium submucosal noncircumferential mass without bleeding or stigmata of recent bleeding in the gastric body, normal duodenum - recommended outpatient EUS for further characterization/biopsy of submucosal lesion.   -1/2024 colonoscopy Dr. Cordova - large AVM in cecum, clip placed   2. Submucosal gastric mass - under eval by [facility] not a bleeding source  3. Acute on chronic anemia - stable   4. Dialysis related nausea   5. COPD on O2 at home   6. Hx of colon resection for diverticular disease   7. Constipation     Plan:  1. No GI interventions planned unless significant overt GI bleeding occurs in light of recent colonoscopy and EGD. If she does develop overt GI bleeding, would consider CTA vs NM bleeding scan as initial evaluation for source of anemia.   2. Monitor H/H, transfuse if Hgb <7.  3. IV PPI BID  4. Continue to optimize bowel regimen - Amitiza, miralax, dulcolax.   5. Continue medical management per primary.      Thank you for this consultation. We will not actively follow, but remain available for questions.      Chief Complaint: anemia      Subjective:  mild lower abd pain w/ associated small volume BM. Denies overt GI bleeding. Continues to eat well.     ROS: Denies any fevers, chills, nausea, vomiting.       General: well nourished, no acute distress  Eyes: conjunctiva normal, EOM normal  Pulmonary: breath sounds normal and effort normal  Abdominal: non-distended, soft, mild lower abd TTP, non-acute  Patient Active Problem List   Diagnosis    Environmental allergies    MICHAEL on CPAP    Chronic pain syndrome    Osteoarthritis of both knees    Hypotension    Coronary artery disease involving native coronary artery of native heart without angina pectoris    Acute 
    Progress Note      73-year-old female with past medical history of ESRD, on anticoagulations admitted for severe anemia, following for ESRD management.  Subjective      Overnight event noted  During dialysis.      IMPRESSION:   ESRD, Monday Wednesday Friday schedule  Access AV fistula  Anemia, GI bleed, on anticoagulation for DVT  Systolic heart failure,  COPD on home oxygen  Diabetes   PLAN:   Hemodialysis with 2K bath, UF goal 1.5 to 2 L as tolerated.  Continue to monitor hemoglobin, blood transfusion per primary team.  Adjust medication per ESRD status.   On 3/20/2024, I saw and examined patient during hemodialysis treatment. The patient was receiving hemodialysis for treatment of  renal failure. I have also reviewed vital signs, intake and output, lab results and recent events, and agreed with today's dialysis order.           Current Facility-Administered Medications   Medication Dose Route Frequency    0.9 % sodium chloride infusion   IntraVENous PRN    pantoprazole (PROTONIX) 40 mg in sodium chloride (PF) 0.9 % 10 mL injection  40 mg IntraVENous Daily    insulin lispro (HUMALOG) injection vial 0-8 Units  0-8 Units SubCUTAneous TID WC    insulin lispro (HUMALOG) injection vial 0-4 Units  0-4 Units SubCUTAneous Nightly    ipratropium 0.5 mg-albuterol 2.5 mg (DUONEB) nebulizer solution 1 Dose  1 Dose Inhalation Q4H PRN    0.9 % sodium chloride infusion   IntraVENous PRN       Review of Systems:     Complete 10-point review of systems were obtained and discussed in length  with the patient. Complete review of systems was negative/unremarkable  except as mentioned in HPI section.  Data Review:    Labs: Results:       Chemistry Recent Labs     03/19/24  2344      K 3.9      CO2 31   BUN 50*      CBC w/Diff Recent Labs     03/19/24  2344   WBC 7.7   RBC 1.94*   HGB 5.8*   HCT 18.7*         Coagulation Recent Labs     03/19/24  2344   INR 1.5*   APTT 37.0*       Iron/Ferritin No results for 
    Progress Note      73-year-old female with past medical history of ESRD, on anticoagulations admitted for severe anemia, following for ESRD management.  Subjective      Overnight event noted  Her breathing is at baseline     IMPRESSION:   ESRD, Monday Wednesday Friday schedule  Access AV fistula  Anemia, GI bleed, on anticoagulation for DVT  Systolic heart failure,  COPD on home oxygen  Diabetes   PLAN:   Hemodialysis  in progress tolerating well  Adjust medication per ESRD status.  Epo for anemia              Current Facility-Administered Medications   Medication Dose Route Frequency    traMADol (ULTRAM) tablet 50 mg  50 mg Oral Q8H PRN    polyethylene glycol (GLYCOLAX) packet 17 g  17 g Oral TID    diclofenac sodium (VOLTAREN) 1 % gel 2 g  2 g Topical BID    gabapentin (NEURONTIN) capsule 300 mg  300 mg Oral Nightly    epoetin agnes-epbx (RETACRIT) 5,000 Units combo injection  5,000 Units SubCUTAneous Once per day on Mon Wed Fri    glucose chewable tablet 16 g  4 tablet Oral PRN    dextrose bolus 10% 125 mL  125 mL IntraVENous PRN    Or    dextrose bolus 10% 250 mL  250 mL IntraVENous PRN    Glucagon Emergency SOLR 1 mg  1 mg SubCUTAneous PRN    dextrose 10 % infusion   IntraVENous Continuous PRN    docusate sodium (COLACE) capsule 100 mg  100 mg Oral BID    lubiprostone (AMITIZA) capsule 16 mcg  16 mcg Oral BID WC    pantoprazole (PROTONIX) 40 mg in sodium chloride (PF) 0.9 % 10 mL injection  40 mg IntraVENous Daily    insulin lispro (HUMALOG) injection vial 0-8 Units  0-8 Units SubCUTAneous TID WC    insulin lispro (HUMALOG) injection vial 0-4 Units  0-4 Units SubCUTAneous Nightly    allopurinol (ZYLOPRIM) tablet 100 mg  100 mg Oral Once per day on Mon Wed Fri    atorvastatin (LIPITOR) tablet 40 mg  40 mg Oral Nightly    insulin glargine (LANTUS) injection vial 6 Units  6 Units SubCUTAneous QAM    [Held by provider] metoprolol succinate (TOPROL XL) extended release tablet 50 mg  50 mg Oral Daily    sodium 
    Progress Note      73-year-old female with past medical history of ESRD, on anticoagulations admitted for severe anemia, following for ESRD management.  Subjective      Overnight event noted  Her breathing is at baseline     IMPRESSION:   ESRD, Monday Wednesday Friday schedule  Access AV fistula  Anemia, GI bleed, on anticoagulation for DVT  Systolic heart failure,  COPD on home oxygen  Diabetes   PLAN:   Hemodialysis  today k3 bath  Adjust medication per ESRD status.              Current Facility-Administered Medications   Medication Dose Route Frequency    traMADol (ULTRAM) tablet 50 mg  50 mg Oral Q6H PRN    lubiprostone (AMITIZA) capsule 16 mcg  16 mcg Oral BID WC    pantoprazole (PROTONIX) 40 mg in sodium chloride (PF) 0.9 % 10 mL injection  40 mg IntraVENous Daily    insulin lispro (HUMALOG) injection vial 0-8 Units  0-8 Units SubCUTAneous TID WC    insulin lispro (HUMALOG) injection vial 0-4 Units  0-4 Units SubCUTAneous Nightly    allopurinol (ZYLOPRIM) tablet 100 mg  100 mg Oral Once per day on Mon Wed Fri    atorvastatin (LIPITOR) tablet 40 mg  40 mg Oral Nightly    insulin glargine (LANTUS) injection vial 6 Units  6 Units SubCUTAneous QAM    [Held by provider] metoprolol succinate (TOPROL XL) extended release tablet 50 mg  50 mg Oral Daily    sodium chloride flush 0.9 % injection 5-40 mL  5-40 mL IntraVENous 2 times per day    sodium chloride flush 0.9 % injection 5-40 mL  5-40 mL IntraVENous PRN    0.9 % sodium chloride infusion   IntraVENous PRN    ondansetron (ZOFRAN-ODT) disintegrating tablet 4 mg  4 mg Oral Q8H PRN    Or    ondansetron (ZOFRAN) injection 4 mg  4 mg IntraVENous Q6H PRN    polyethylene glycol (GLYCOLAX) packet 17 g  17 g Oral Daily PRN    acetaminophen (TYLENOL) tablet 650 mg  650 mg Oral Q6H PRN    Or    acetaminophen (TYLENOL) suppository 650 mg  650 mg Rectal Q6H PRN    melatonin tablet 3 mg  3 mg Oral Nightly    ipratropium 0.5 mg-albuterol 2.5 mg (DUONEB) nebulizer solution 1 
    Progress Note      73-year-old female with past medical history of ESRD, on anticoagulations admitted for severe anemia, following for ESRD management.  Subjective      Overnight event noted  Her breathing is at baseline     IMPRESSION:   ESRD, Monday Wednesday Friday schedule  Access AV fistula  Anemia, GI bleed, on anticoagulation for DVT  Systolic heart failure,  COPD on home oxygen  Diabetes   PLAN:   Hemodialysis  tomorrow  Adjust medication per ESRD status.  Epo for anemia              Current Facility-Administered Medications   Medication Dose Route Frequency    diclofenac sodium (VOLTAREN) 1 % gel 2 g  2 g Topical BID    gabapentin (NEURONTIN) capsule 300 mg  300 mg Oral Nightly    polyethylene glycol (GLYCOLAX) packet 17 g  17 g Oral BID    traMADol (ULTRAM) tablet 50 mg  50 mg Oral Q12H PRN    glucose chewable tablet 16 g  4 tablet Oral PRN    dextrose bolus 10% 125 mL  125 mL IntraVENous PRN    Or    dextrose bolus 10% 250 mL  250 mL IntraVENous PRN    Glucagon Emergency SOLR 1 mg  1 mg SubCUTAneous PRN    dextrose 10 % infusion   IntraVENous Continuous PRN    docusate sodium (COLACE) capsule 100 mg  100 mg Oral BID    lubiprostone (AMITIZA) capsule 16 mcg  16 mcg Oral BID WC    pantoprazole (PROTONIX) 40 mg in sodium chloride (PF) 0.9 % 10 mL injection  40 mg IntraVENous Daily    insulin lispro (HUMALOG) injection vial 0-8 Units  0-8 Units SubCUTAneous TID WC    insulin lispro (HUMALOG) injection vial 0-4 Units  0-4 Units SubCUTAneous Nightly    allopurinol (ZYLOPRIM) tablet 100 mg  100 mg Oral Once per day on Mon Wed Fri    atorvastatin (LIPITOR) tablet 40 mg  40 mg Oral Nightly    insulin glargine (LANTUS) injection vial 6 Units  6 Units SubCUTAneous QAM    [Held by provider] metoprolol succinate (TOPROL XL) extended release tablet 50 mg  50 mg Oral Daily    sodium chloride flush 0.9 % injection 5-40 mL  5-40 mL IntraVENous 2 times per day    sodium chloride flush 0.9 % injection 5-40 mL  5-40 mL 
  Pharmacy Note     Tramadol 50mg q6h prn ordered for treatment of pain. Per [facility] Policy, tramadol will be changed to 50mg q12h prn.     Estimated Creatinine Clearance: Estimated Creatinine Clearance: 13 mL/min (A) (based on SCr of 4.13 mg/dL (H)).  Dialysis Status, LUIS ALBERTO, CKD: -  BMI:  Body mass index is 30.97 kg/m².    Rationale for Adjustment:  [facility] renal dosing policy.    Pharmacy will continue to monitor and adjust dose as necessary.      Please call with any questions.    Thank you,  LAYLA SIERRA, [facility]    
  Physician Progress Note      PATIENT:               JESUS MONTEIRO  CSN #:                  [id number]  :                       1950  ADMIT DATE:       3/19/2024 9:48 PM  DISCH DATE:  RESPONDING  PROVIDER #:        Low Borrego MD          QUERY TEXT:      Dear Hospitalist    Pt admitted with GI  bleed.   Pt noted to have COPD  on  home  02. If   possible, please document in the progress notes and discharge summary if you   are evaluating and/or treating any of the following:    The medical record reflects the following:  Risk Factors: h/o  smoking;   COPD  Clinical Indicators:  per  H&P- COPD-  On Chronic home O2    Treatment: 02  initially ordered    Thank you,   Caity Lyn RN   CCDS  Options provided:  -- Chronic respiratory failure with hypoxia  -- Chronic respiratory failure with hypercapnia  -- Chronic respiratory failure with hypoxia and hypercapnia  -- Other - I will add my own diagnosis  -- Disagree - Not applicable / Not valid  -- Disagree - Clinically unable to determine / Unknown  -- Refer to Clinical Documentation Reviewer    PROVIDER RESPONSE TEXT:    This patient has chronic respiratory failure with hypoxia.    Query created by: Elvira Lyn on 3/21/2024 12:08 PM      QUERY TEXT:    Dear Hospitalist      Patient admitted with acute  anemia and noted to have  Temp  100.8  HR  106    with  lactic acid  2.6.  If possible, please document in progress notes and   discharge summary if you are evaluating and/or treating any of the following:      The medical record reflects the following:  Risk Factors: ESRD  on  HD;  diabetes;  Clinical Indicators: WBC  7.7;   temp   100.8;  HR  106;   lactic acid  2.6;    pro perico 0.34;  per  ER  MD-  Differential for generalized weakness includes underlying   infection, possible sepsis physiology, versus electrolyte abnormality  Treatment:   vs  monitored    Thank you,   Caity Lyn RN   CCDS  Options provided:  -- SIRS of non-infectious origin 
 conducted a Follow up consultation and Spiritual Assessment for Christy Donahue, who is a 73 y.o.,female.      The  provided the following Interventions:  Continued the relationship of care and support.   Listened empathically.  Chart reviewed.    The following outcomes were achieved:  Patient expressed gratitude for 's visit.    Assessment:  There are no further spiritual or Jainism issues which require Spiritual Care Services interventions at this time.     Plan:  Chaplains will continue to follow and will provide pastoral care on an as needed/requested basis.   recommends bedside caregivers page  on duty if patient shows signs of acute spiritual or emotional distress.       Chaplain Mary Marks  Spiritual Care   [phone]   
0949: Attempted to see patient for PT treatment however she is currently in dialysis.  Will re-attempt later in the day.  Megan Aranda, PT    1155: Second attempt to see patient and she continues to be off the floor.    1330:  Third attempt to see patient nursing was entering the room to do an EKG as the patient was having and elevated HR.    
Dulcolax suppository 10 mg given, pt refused Voltaren gel stating it doesn't work for her. Care ongoing.  
HD Care plan  Time: 3 hrs  Dialysate:  2K+   2.5Ca++  Bath  Net UF: 2L  Access: Aseptically care for LUE AVF/AVG  Hemodynamic stability: Maintain BP WNL     Pre Dialysis:  Pt received from YENIFER Navarro RN. Patient arrived on a bed, A+O X 3, on 3L O2 via NC, no s/s of acute distress noted. LUE AVF/AVG assessed, no abnormalities noted, bruit and thrill strong. AVF/AVG accessed using 15 G needles without any difficulty. Good flows achieved from both needles.  Intra Dialysis:  Time out / safety process performed per policy, Tx initiated at 1020.    AVF/AVG flowing with ease. For hemodynamic stability UF goal set at 2000 ml as tolerated.  Pt offered assistance with repositioning every 2 hours/prn    Vascular access visible and line connections remained intact throughout entire duration of treatment.   Vital signs checked every 15 mins.     Post Dialysis:  Tx completed at 1320,   Tolerated well , 2 L  removed. De-accessed per protocol.    Clot time 5 minutes for arterial, and 5 minutes for venous.   Dry dressings applied.  Bruit/Thrill present above and below dressings.  Post Dialysis report given to unit Nurse YENIFER Navarro RN      
In  the room changing the patient bed and makiing the bed.                                                                                Liliana  
[facility] Hospitalist Group  Progress Note    Patient: Christy Donahue Age: 73 y.o. : 1950 MR#: [id number] SSN: [id number]  Date/Time: 3/22/2024     Subjective:     Patient seen and evaluated, lying in bed, no acute distress.    73-year-old female with a past medical history of end-stage renal disease on hemodialysis, anemia of chronic disease, history of DVT left popliteal and posterior tibial vein on Eliquis, history of type 2 diabetes, chronic pain, history of ESBL, wheelchair-bound, left hip pain recently had a colostomy takedown on 2023 via ex lap with lysis of adhesions.  Patient presents to the emergency room noted to have a hemoglobin of 5.8.  GI consulted.  Vascular ultrasound lower extremity shows age-indeterminate nonocclusive DVT of the left distal popliteal vein.  Patient will likely need to continue Eliquis on discharge.    3/22-patient seen evaluated, lying in bed, no acute distress.  Patient admitted to the hospital secondary to anemia with GI bleed.  GI consulted, appreciate input  Patient recently had EGD on  at [facility].   patient had colonoscopy which showed large AVM in cecum, replaced.  Patient transfused 1 unit PRBC, hemoglobin downtrending.  Patient also has a history of DVT on Eliquis and aspirin which is currently on hold given anemia.  Discussed with vascular surgery, okay to hold Eliquis and she does not need it at discharge.      Assessment/Plan:     Anemia with heme positive stools -appreciate GI input, patient recently had EGD and colonoscopy done at [facility].  Continue to monitor.  Conservative management per GI note.  Patient transfused 1 unit PRBC, continue to monitor H&H.  History of DVT on Eliquis as outpatient, currently on hold.  Vascular ultrasound done shows age-indeterminate nonocclusive DVT in the left distal popliteal vein.  Discussed with vascular surgery, patient does not need Eliquis on discharge.  Chronic bilateral lower extremity 
[facility] Hospitalist Group  Progress Note    Patient: Christy Donahue Age: 73 y.o. : 1950 MR#: [id number] SSN: [id number]  Date/Time: 3/23/2024     Subjective:     73-year-old female with a past medical history of end-stage renal disease on hemodialysis, anemia of chronic disease, history of DVT left popliteal and posterior tibial vein on Eliquis, history of type 2 diabetes, chronic pain, history of ESBL, wheelchair-bound, left hip pain recently had a colostomy takedown on 2023 via ex lap with lysis of adhesions.  Patient presents to the emergency room noted to have a hemoglobin of 5.8.  GI consulted.  Vascular ultrasound lower extremity shows age-indeterminate nonocclusive DVT of the left distal popliteal vein.  Patient will likely need to continue Eliquis on discharge.    Dialysis yesterday, treatment ended 41 minutes early due to clotted circuit, patient stated she did not want to have HD restarted w/new circuit. Estimated blood loss 125cc. Total run time 2 hrs 19 minutes.  1.4L removed.    notes post dialysis nausea . Stools still dark, no hematochezia.     S/e at bedside, she reports ultram not helping her abdominal pain much. Denies chest pain, shortness of breath, cough. +tenesmus, and chronic constipation. Takes dulcolax at home.      Assessment/Plan:     Anemia with heme positive stools -appreciate GI input, patient recently had EGD and colonoscopy done at [facility].  Continue to monitor.  Conservative management per GI note.  Patient transfused 1 unit PRBC, continue to monitor H&H.  History of DVT on Eliquis as outpatient, currently on hold.  Vascular ultrasound done shows age-indeterminate nonocclusive DVT in the left distal popliteal vein.  Discussed with vascular surgery, patient does not need Eliquis on discharge.  Chronic bilateral lower extremity pain-continue tramadol  History of COPD-stable  History of type 2 diabetes-continue insulin sliding scale, monitor blood 
Occupational Therapy  Orders received, chart reviewed and this patient states: \"I'm in too much pain to do anything\" this patient is angry that her pain medicine is not working. Her concerns were relayed to nursing. Will follow up later as appropriate for this patient. Megan Salinas, OTR/L  
Patient arrived via stretcher admitted to room 202 on 2S for diagnosis of upper GI bleed. Nurse assessed patient and went over SBAR,labs, Home medications, Care plan and room environment. Patient is agreeable with care. Patient denies any pain at this time. Skin issues documented on flowsheet. Care plan ongoing.   
Patient seen evaluated, lying in bed, no acute distress.  Patient admitted by my colleague earlier this morning.  73-year-old female with a past medical history of end-stage renal disease on hemodialysis, anemia of chronic disease, history of DVT left popliteal and posterior tibial vein on Eliquis, history of type 2 diabetes, chronic pain, history of ESBL, wheelchair-bound, left hip pain recently had a colostomy takedown on 6/30/2023 via ex lap with lysis of adhesions.  Patient presents to the emergency room noted to have a hemoglobin of 5.8.  GI consulted.  Vascular ultrasound lower extremity shows age-indeterminate nonocclusive DVT of the left distal popliteal vein.  Patient will likely need to continue Eliquis on discharge.  Currently Eliquis is on hold secondary to anemia.  Further treatment plan as outlined in H&P, will continue to follow.  
Patient's heart rate elevated to 120's up into the 140's. Notified MD Dr. Noel. EKG 12 lead was ordered.  
Received pre HD report from  ANATOLIY Mera RN.        Pt in bed, A+O x4, no s/s of distress noted.      Accessed AVF Left upper arm w/15G needle w/o difficulty.    Tx initiated at 1340.      AVF flowing with ease.  For hemodynamic stability UF goal 2500 ml.      Offered assistance with repositioning every 2 hours.  Vascular access visible at all times throughout entire duration of treatment and line connections remain intact throughout entire duration of treatment.     Patient treatment ended 41 minutes early due to clotted circuit, patient stated she did not want to have HD restarted w/new circuit.  Estimated blood loss 125cc.  Total run time 2 hrs 19 minutes.  Md made aware.      Tx completed at 1603, tolerated well 1.4L removed.  De-accessed per protocol.    Clot time 5 minutes for arterial, and 5 minutes for venous.      Unit nurse ANATOLIY Mera RN given report.   
Vascular Surgery     Patient with concern for GI bleed with age indeterminate distal popliteal vein DVT. Called for possible IVC filter in patient who may not qualify for anticoagulation.     Patient was first diagnosed with this distal popliteal vein DVT in 2023. On my review the dvt is chronic and stable.     Patient anticoagulation can be held and resumed when medically cleared.   Recommend repeat bilateral lower extremity venous duplex    Sanket Farmer MD    Addendum  Vascular US Duplex Lower Extremity Venous Left    Patient Name: Christy Donahue   Patient MRN: [id number]   Patient : 1950 (73 y.o.)   Gender Identity: Female   Height: 1.626 m (5' 4\")   Weight: 84.6 kg (186 lb 8 oz)   BSA: 1.95 m²   Blood Pressure: 149/70    Accession Number: [id number]   Date of Study: 2024 ( 3:24 PM)   Ordering Provider: Cornelio Jain PA-C   Clinical Indications: DVT Hx       Interpreting Physicians  Performing Staff   Mani King MD Tech/Nurse: Reno Chatman        Vascular History    Vascular duplex lower extremity venous left (Order #[id number]) on 3/25/24     Reason for Exam  Priority: Routine  DVT Hx   Dx: Deep vein thrombosis (DVT) of popliteal vein of left lower extremity, unspecified chronicity (HCC) [I82.432 (ICD-10-CM)]     PACS Images     Show images for Vascular duplex lower extremity venous left        Procedure Details    A gray scale, color Doppler imaging and spectral Doppler analysis ultrasound was performed. During the study longitudinal and transverse views were obtained. Continuous wave doppler was performed. Overall the study quality was good.     Lower Extremity Venous Findings    Right Lower Venous    For comparison purposes, the right common femoral vein was briefly interrogated. The vein demonstrates normal color filling and compressibility. Doppler flow was phasic and spontaneous.     Common Femoral Vein: Patent, normal phasicity, spontaneous, normal augmentation, 
melatonin tablet 3 mg  3 mg Oral Nightly    ipratropium 0.5 mg-albuterol 2.5 mg (DUONEB) nebulizer solution 1 Dose  1 Dose Inhalation Q4H PRN    0.9 % sodium chloride infusion   IntraVENous PRN       Review of Systems:     Complete 10-point review of systems were obtained and discussed in length  with the patient. Complete review of systems was negative/unremarkable  except as mentioned in HPI section.  Data Review:    Labs: Results:       Chemistry Recent Labs     03/19/24 2344 03/21/24  0335    139   K 3.9 3.3*    99*   CO2 31 31   BUN 50* 25*      CBC w/Diff Recent Labs     03/19/24 2344 03/20/24  1443 03/20/24  1946 03/21/24  0335 03/21/24  1024   WBC 7.7  --   --   --   --    RBC 1.94*  --   --   --   --    HGB 5.8*   < > 8.2* 8.3* 8.3*   HCT 18.7*   < > 25.0* 25.9* 26.6*     --   --   --   --     < > = values in this interval not displayed.      Coagulation Recent Labs     03/19/24 2344 03/21/24  0335   INR 1.5* 1.2*   APTT 37.0*  --        Iron/Ferritin Recent Labs     03/20/24  1443   IRON 99      BNP Invalid input(s): \"BNPP\"   Cardiac Enzymes No results for input(s): \"CPK\", \"MIGUEL\" in the last 72 hours.    Invalid input(s): \"CKRMB\", \"CKND1\", \"TROIP\"   Liver Enzymes No results for input(s): \"TP\", \"ALB\" in the last 72 hours.    Invalid input(s): \"TBIL\", \"AP\", \"SGOT\", \"GPT\", \"DBIL\"   Thyroid Studies Lab Results   Component Value Date/Time    TSH 1.29 10/08/2022 05:15 PM         EKG: normal EKG, normal sinus rhythm.    Physical Assessment:   Visit Vitals  /69   Pulse 57   Temp 98.4 °F (36.9 °C) (Oral)   Resp 17   Ht 1.626 m (5' 4\")   Wt 80.8 kg (178 lb 2.5 oz)   SpO2 97%   BMI 30.58 kg/m²     Weight change: -2.268 kg (-5 lb)    Intake/Output Summary (Last 24 hours) at 3/21/2024 1330  Last data filed at 3/20/2024 1806  Gross per 24 hour   Intake 700 ml   Output 2850 ml   Net -2150 ml     Physical Exam:   General: comfortable, no acute distress   HEENT sclera anicteric, supple neck, no 
(HCC)    Hypoxia    Severe obesity (BMI 35.0-35.9 with comorbidity) (HCC)    COPD, mild (HCC)    Cardiomyopathy (HCC)    Left foot pain    Acute decompensated heart failure (HCC)    Chronic hypoxic respiratory failure (HCC)    Chronic anemia    Goals of care, counseling/discussion    Shortness of breath    Myalgia    UGIB (upper gastrointestinal bleed)    Gastrointestinal hemorrhage with melena         BP (!) 113/55   Pulse 91   Temp 99 °F (37.2 °C) (Oral)   Resp 20   Ht 1.626 m (5' 4\")   Wt 81.8 kg (180 lb 6.4 oz)   SpO2 98%   BMI 30.97 kg/m²     General Appearance: alert and oriented to person, place and time, well-developed and well-nourished, in no acute distress and obese  Abdomen: soft, non-tender, non-distended, normal bowel sounds, no masses or organomegaly        Intake/Output Summary (Last 24 hours) at 3/23/2024 0900  Last data filed at 3/23/2024 0430  Gross per 24 hour   Intake 1900 ml   Output 1475 ml   Net 425 ml       CBC w/Diff    Lab Results   Component Value Date/Time    WBC 7.7 03/19/2024 11:44 PM    RBC 1.94 (L) 03/19/2024 11:44 PM    HGB 9.1 (L) 03/22/2024 12:52 PM    HCT 29.1 (L) 03/22/2024 12:52 PM    MCV 96.4 03/19/2024 11:44 PM    MCH 29.9 03/19/2024 11:44 PM    MCHC 31.0 03/19/2024 11:44 PM    RDW 17.7 (H) 03/19/2024 11:44 PM     03/19/2024 11:44 PM    MPV 9.0 (L) 03/19/2024 11:44 PM    Lab Results   Component Value Date/Time    BANDS 2 08/08/2023 03:45 AM      Basic Metabolic Profile   Recent Labs     03/22/24  0426      K 3.5      CO2 33*   BUN 38*   GLUCOSE 135*   MG 2.0        Hepatic Function    Lab Results   Component Value Date/Time    ALT 18 03/14/2024 02:40 PM    No components found for: \"SGOT\", \"GPT\", \"DBILI\", \"TBIL\"       Coags   Recent Labs     03/21/24  0335   INR 1.2*               WENDY LOPEZ MD    [facility]  [email or web address]  Phone: [phone]      
10 mL injection  40 mg IntraVENous Daily    insulin lispro (HUMALOG) injection vial 0-8 Units  0-8 Units SubCUTAneous TID WC    insulin lispro (HUMALOG) injection vial 0-4 Units  0-4 Units SubCUTAneous Nightly    allopurinol (ZYLOPRIM) tablet 100 mg  100 mg Oral Once per day on Mon Wed Fri    atorvastatin (LIPITOR) tablet 40 mg  40 mg Oral Nightly    insulin glargine (LANTUS) injection vial 6 Units  6 Units SubCUTAneous QAM    [Held by provider] metoprolol succinate (TOPROL XL) extended release tablet 50 mg  50 mg Oral Daily    sodium chloride flush 0.9 % injection 5-40 mL  5-40 mL IntraVENous 2 times per day    sodium chloride flush 0.9 % injection 5-40 mL  5-40 mL IntraVENous PRN    0.9 % sodium chloride infusion   IntraVENous PRN    ondansetron (ZOFRAN-ODT) disintegrating tablet 4 mg  4 mg Oral Q8H PRN    Or    ondansetron (ZOFRAN) injection 4 mg  4 mg IntraVENous Q6H PRN    acetaminophen (TYLENOL) tablet 650 mg  650 mg Oral Q6H PRN    Or    acetaminophen (TYLENOL) suppository 650 mg  650 mg Rectal Q6H PRN    melatonin tablet 3 mg  3 mg Oral Nightly    ipratropium 0.5 mg-albuterol 2.5 mg (DUONEB) nebulizer solution 1 Dose  1 Dose Inhalation Q4H PRN    0.9 % sodium chloride infusion   IntraVENous PRN       Imaging:   XR CHEST PORTABLE    Result Date: 3/20/2024  Borderline prominent cardiac silhouette and faint perihilar interstitial prominence may be simulated by low lung volumes, but mild infiltrate/edema difficult to exclude.    XR ANKLE RIGHT (MIN 3 VIEWS)    Result Date: 3/20/2024  Right femur: -No acute osseous findings. Right ankle: -No acute osseous findings. See additional details above.    XR FEMUR RIGHT (MIN 2 VIEWS)    Result Date: 3/20/2024  Right femur: -No acute osseous findings. Right ankle: -No acute osseous findings. See additional details above.    XR ANKLE LEFT (MIN 3 VIEWS)    Result Date: 3/20/2024  Left femur: -No acute osseous findings. Left knee: -No acute osseous findings. Left ankle: -No 
3/20/2024  Left femur: -No acute osseous findings. Left knee: -No acute osseous findings. Left ankle: -No acute osseous findings. See additional details above. Left femur: No acute osseous findings. Degenerative changes as above.    CT ABDOMEN PELVIS W IV CONTRAST Additional Contrast? Radiologist Recommendation    Result Date: 3/20/2024  Gastric body wall abnormality as described, possibly focal gastritis or ulcer disease. Correlate clinically and consider endoscopic correlation further characterize at some point. Suspected fibroid uterus. Atherosclerosis. Likely right renal cysts similar to prior. See additional details above.       Labs:    Recent Results (from the past 48 hour(s))   Hemoglobin and Hematocrit    Collection Time: 03/22/24 12:52 PM   Result Value Ref Range    Hemoglobin 9.1 (L) 12.0 - 16.0 g/dL    Hematocrit 29.1 (L) 35.0 - 45.0 %   POCT Glucose    Collection Time: 03/22/24  4:52 PM   Result Value Ref Range    POC Glucose 121 (H) 70 - 110 mg/dL   POCT Glucose    Collection Time: 03/22/24  8:34 PM   Result Value Ref Range    POC Glucose 155 (H) 70 - 110 mg/dL   POCT Glucose    Collection Time: 03/23/24  8:15 AM   Result Value Ref Range    POC Glucose 111 (H) 70 - 110 mg/dL   CBC    Collection Time: 03/23/24  9:03 AM   Result Value Ref Range    WBC 7.7 4.6 - 13.2 K/uL    RBC 3.13 (L) 4.20 - 5.30 M/uL    Hemoglobin 9.3 (L) 12.0 - 16.0 g/dL    Hematocrit 29.5 (L) 35.0 - 45.0 %    MCV 94.2 78.0 - 100.0 FL    MCH 29.7 24.0 - 34.0 PG    MCHC 31.5 31.0 - 37.0 g/dL    RDW 16.6 (H) 11.6 - 14.5 %    Platelets 456 (H) 135 - 420 K/uL    MPV 9.0 (L) 9.2 - 11.8 FL    Nucleated RBCs 0.3 (H) 0  WBC    nRBC 0.02 (H) 0.00 - 0.01 K/uL   POCT Glucose    Collection Time: 03/23/24 11:22 AM   Result Value Ref Range    POC Glucose 129 (H) 70 - 110 mg/dL   POCT Glucose    Collection Time: 03/23/24  4:10 PM   Result Value Ref Range    POC Glucose 119 (H) 70 - 110 mg/dL   POCT Glucose    Collection Time: 03/23/24  8:41 
  EKG 12 Lead    Collection Time: 03/19/24 11:58 PM   Result Value Ref Range    Ventricular Rate 97 BPM    Atrial Rate 97 BPM    P-R Interval 182 ms    QRS Duration 78 ms    Q-T Interval 388 ms    QTc Calculation (Bazett) 492 ms    P Axis 82 degrees    R Axis 67 degrees    T Axis 102 degrees    Diagnosis       Normal sinus rhythm  Prolonged QT  Abnormal ECG  When compared with ECG of 14-MAR-2024 14:42,  aberrant conduction is no longer present  Nonspecific T wave abnormality, improved in Lateral leads  Confirmed by Shayan Lentz MD, ----- ([id number]) on 3/20/2024 10:02:55 AM     COVID-19 & Influenza Combo    Collection Time: 03/20/24  1:20 AM    Specimen: Nasopharyngeal   Result Value Ref Range    SARS-CoV-2, PCR Not detected NOTD      Rapid Influenza A By PCR Not detected NOTD      Rapid Influenza B By PCR Not detected NOTD     TYPE AND SCREEN    Collection Time: 03/20/24  1:20 AM   Result Value Ref Range    Crossmatch expiration date 03/23/2024,2359     ABO/Rh O POSITIVE     Antibody Screen NEG     CALLED TO ARTHUR MICHAEL, 0238, 3/20/24, BY RONIT     Called To: ADALID MICHAEL, 0545, 3/20/24, BY RONIT     Unit Number [id number]     Product Code Blood Bank RC LR,2     Unit Divison 00     Dispense Status Blood Bank TRANSFUSED     Unit Issue Date/Time 207513482290     Product Code Blood Bank [id number]     Blood Bank Unit Type and Rh O POS     Blood Bank ISBT Product Blood Type 5100     Blood Bank Blood Product Expiration Date 202404122359     Crossmatch Result Compatible     Unit Number [id number]     Product Code Blood Bank RC LR,1     Unit Divison 00     Dispense Status Blood Bank TRANSFUSED     Unit Issue Date/Time 483244061793     Product Code Blood Bank [id number]     Blood Bank Unit Type and Rh O POS     Blood Bank ISBT Product Blood Type 5100     Blood Bank Blood Product Expiration Date 202404122359     Crossmatch Result Compatible    Occult Blood, Fecal    Collection Time: 03/20/24  2:05 AM   Result Value Ref Range    
Achilles enthesophyte and plantar calcaneal spur. Surgical anchor at  the posterior calcaneus. Soft tissue swelling.  Impression: Left femur:  -No acute osseous findings.    Left knee:  -No acute osseous findings.    Left ankle:  -No acute osseous findings.    See additional details above.    Left femur:  No acute osseous findings. Degenerative changes as above.  XR KNEE LEFT (3 VIEWS)  Narrative: EXAMINATION:    Left femur 2 views  Left knee 3 views  Left ankle 3 views    INDICATION: Trauma, fall, left lower extremity pain    COMPARISON: Left ankle 5/3/2019    FINDINGS:    Left femur: Frontal and lateral views. No acute fracture or subluxation. Mild  left hip degenerative changes. Greater trochanter enthesopathy. Scattered  atherosclerotic calcifications. Notable left knee degenerative changes. No focal  suspicious soft tissue findings.    Left knee: 3 views. No acute fracture or subluxation. Notable marginal  osteophytes. Mild lateral compartment joint space loss. Superior patellar  enthesophyte. Trace joint effusion.    Left ankle: 3 views. No acute fracture or subluxation. Mild tibiotalar and  talofibular degenerative changes. Small chronic ossific fragment along lateral  malleolus. Achilles enthesophyte and plantar calcaneal spur. Surgical anchor at  the posterior calcaneus. Soft tissue swelling.  Impression: Left femur:  -No acute osseous findings.    Left knee:  -No acute osseous findings.    Left ankle:  -No acute osseous findings.    See additional details above.    Left femur:  No acute osseous findings. Degenerative changes as above.  CT ABDOMEN PELVIS W IV CONTRAST Additional Contrast? Radiologist Recommendation  Narrative: EXAMINATION: CT abdomen/pelvis with contrast    INDICATION: Abdominal pain, nausea, vomiting    COMPARISON: CT 3/8/2024    TECHNIQUE: CT of the abdomen pelvis with IV contrast. All CT scans at this  facility are performed using dose optimization technique as appropriate to 
uterus. Atherosclerosis. Likely right renal cysts similar to  prior. See additional details above.       No valid procedures specified.                 SONU Garcia    [facility]  Phone: [phone]  [facility] Cell: [phone] (Mon-Fri [id number])

## 2024-03-26 NOTE — PLAN OF CARE
Problem: Chronic Conditions and Co-morbidities  Goal: Patient's chronic conditions and co-morbidity symptoms are monitored and maintained or improved  Outcome: Progressing     
  Problem: Chronic Conditions and Co-morbidities  Goal: Patient's chronic conditions and co-morbidity symptoms are monitored and maintained or improved  Outcome: Progressing  Flowsheets (Taken 3/25/2024 2000)  Care Plan - Patient's Chronic Conditions and Co-Morbidity Symptoms are Monitored and Maintained or Improved: Monitor and assess patient's chronic conditions and comorbid symptoms for stability, deterioration, or improvement     Problem: Discharge Planning  Goal: Discharge to home or other facility with appropriate resources  Outcome: Progressing  Flowsheets (Taken 3/25/2024 2000)  Discharge to home or other facility with appropriate resources: Identify barriers to discharge with patient and caregiver     Problem: Pain  Goal: Verbalizes/displays adequate comfort level or baseline comfort level  Outcome: Progressing     Problem: Safety - Adult  Goal: Free from fall injury  Outcome: Progressing     
  Problem: Occupational Therapy - Adult  Goal: By Discharge: Performs self-care activities at highest level of function for planned discharge setting.  See evaluation for individualized goals.  Outcome: Progressing  Note: Occupational Therapy Goals:  Initiated 3/24/2024 to be met within 7-10 days.    1.  Patient will perform grooming standing with good balance with modified independence.   2.  Patient will perform upper body dressing with modified independence.  3.  Patient will perform lower body dressing standing with supervision/set-up.  4.  Patient will perform toilet transfers with supervision/set-up.  5.  Patient will perform all aspects of toileting with supervision/set-up.  6.  Patient will participate in upper extremity therapeutic exercise/activities with modified independence for 8-10 minutes to increase strength/endurance for ADLs.    7.  Patient will utilize energy conservation techniques during functional activities with verbal cues.    PLOF: Independent with ADLs , lives with  at home  OCCUPATIONAL THERAPY EVALUATION    Patient: Christy Donahue (73 y.o. female)  Date: 3/24/2024  Primary Diagnosis: ESRD (end stage renal disease) ([facility]) [N18.6]  Anemia, blood loss [D50.0]  UGIB (upper gastrointestinal bleed) [K92.2]  Gastrointestinal hemorrhage with melena [K92.1]  Deep vein thrombosis (DVT) of proximal lower extremity, unspecified chronicity, unspecified laterality ([facility]) [I82.4Y9]       Precautions: General Precautions, Contact Precautions, Fall Risk    ASSESSMENT :    Patient alert and cleared to participate by RN for OT evaluation. Patient seen supine in bed,  bpm. Pt performed bed mobility to the EOB in preparation for ADL's with supervision. Pt requires supervision to don UBD/socks and wash face with wipes seated,  bpm . Pt performed sit<>stand and 2 lateral steps L/R with CGA, rest breaks needed. Pt requires CGA for stand pivot transfer to use BSC,  bpm . Pt left in bed with 
  Problem: Physical Therapy - Adult  Goal: By Discharge: Performs mobility at highest level of function for planned discharge setting.  See evaluation for individualized goals.  Description: Initiated  3/23/24  to be met within 7-10 days.    1.  Patient will move from supine to sit and sit to supine , scoot up and down, and roll side to side in bed with modified independence.    2.  Patient will transfer from bed to chair and chair to bed with modified independence using the least restrictive device.  3.  Patient will perform sit to stand with modified independence.  4.  Patient will ambulate with modified independence for 150 feet with the least restrictive device.   5.  Patient will ascend/descend 4 stairs with b/l handrail(s) with modified independence.    PLOF: Pt lives with  in single story home with 4 NÉSTOR with handrails.  Pt is [place] with mobility and transfers, has RW and cane.    Outcome: Progressing   PHYSICAL THERAPY TREATMENT    Patient: Christy Donahue (73 y.o. female)  Date: 3/26/2024  Diagnosis: ESRD (end stage renal disease) ([facility]) [N18.6]  Anemia, blood loss [D50.0]  UGIB (upper gastrointestinal bleed) [K92.2]  Gastrointestinal hemorrhage with melena [K92.1]  Deep vein thrombosis (DVT) of proximal lower extremity, unspecified chronicity, unspecified laterality ([facility]) [I82.4Y9] UGIB (upper gastrointestinal bleed)      Precautions: General Precautions, Contact Precautions, Fall Risk,  ,  ,  ,  ,  ,  ,      ASSESSMENT:  Pt resting in bed upon entering room for PT treatment, NAD and agreeable to session.  Pt was Supervision for all bed mobility, good sitting balance.  STS transfer with RW and CGA, ambulated ~ 15 feet in room with CGA.  Pt had one episode of knees \"giving out\" on her and required frequent verbal and tactile cues to stay close to and inside the walker.  Pt stayed up in recliner upon finishing tx session, all needs met, tray table in front of her and call button within reach.  
INTERVENTION:  HEMODYNAMIC STABILIZATION  MAINTAIN BP WNL WHILE ON HD.     INTERVENTION:  FLUID MANAGEMENT  WILL ATTEMPT 2000 ML TOTAL FLUID REMOVAL AS TOLERATED.     INTERVENTION:  METABOLIC/ELECTROLYTE MANAGEMENT  2.0 POTASSIUM 2.5 CALCIUM DIALYSATE USED WITH HD TODAY.     INTERVENTION:  HEMODIALYSIS ACCESS SITE MANAGEMENT  LUE AVF/AVG ACCESSED WITH 15G NEEDLES USING ASEPTIC TECHNIQUE.     GOAL:  SIGNS AND SYMPTOMS OF LISTED POTENTIAL PROBLEMS WILL BE ABSENT OR MANAGEABLE.     OUTCOME:  PROGRESSING.     HD PLANNED FOR 3 HOURS TODAY.      Problem: Chronic Conditions and Co-morbidities  Goal: Patient's chronic conditions and co-morbidity symptoms are monitored and maintained or improved  Outcome: Progressing     
INTERVENTION:  HEMODYNAMIC STABILIZATION  MAINTAIN BP WNL WHILE ON HD.    INTERVENTION:  FLUID MANAGEMENT  WILL ATTEMPT 2500 ML TOTAL FLUID REMOVAL AS TOLERATED.    INTERVENTION:  METABOLIC/ELECTROLYTE MANAGEMENT  2.0 POTASSIUM 2.5 CALCIUM DIALYSATE USED WITH HD TODAY.    INTERVENTION:  HEMODIALYSIS ACCESS SITE MANAGEMENT  LEFT UPPER ARM AVF ACCESSED WITH 16G NEEDLE USING ASEPTIC TECHNIQUE.    GOAL:  SIGNS AND SYMPTOMS OF LISTED POTENTIAL PROBLEMS WILL BE ABSENT OR MANAGEABLE.    OUTCOME:  PROGRESSING.    HD PLANNED FOR 3 HOURS TODAY.        Problem: Chronic Conditions and Co-morbidities  Goal: Patient's chronic conditions and co-morbidity symptoms are monitored and maintained or improved  3/25/2024 0917 by Mey Yu RN  Outcome: Progressing  Flowsheets (Taken 3/25/2024 0917)  Care Plan - Patient's Chronic Conditions and Co-Morbidity Symptoms are Monitored and Maintained or Improved:   Monitor and assess patient's chronic conditions and comorbid symptoms for stability, deterioration, or improvement   Collaborate with multidisciplinary team to address chronic and comorbid conditions and prevent exacerbation or deterioration   Update acute care plan with appropriate goals if chronic or comorbid symptoms are exacerbated and prevent overall improvement and discharge       
INTERVENTION:  HEMODYNAMIC STABILIZATION  MAINTAIN BP WNL WHILE ON HD.    INTERVENTION:  FLUID MANAGEMENT  WILL ATTEMPT 2500 ML TOTAL FLUID REMOVAL AS TOLERATED.    INTERVENTION:  METABOLIC/ELECTROLYTE MANAGEMENT  3.0 POTASSIUM 2.5 CALCIUM DIALYSATE USED WITH HD TODAY.    INTERVENTION:  HEMODIALYSIS ACCESS SITE MANAGEMENT  LEFT UPPER ARM AVF ACCESSED WITH 15G NEEDLE USING ASEPTIC TECHNIQUE.    GOAL:  SIGNS AND SYMPTOMS OF LISTED POTENTIAL PROBLEMS WILL BE ABSENT OR MANAGEABLE.    OUTCOME:  PROGRESSING.    HD PLANNED FOR 3 HOURS TODAY.      Problem: Chronic Conditions and Co-morbidities  Goal: Patient's chronic conditions and co-morbidity symptoms are monitored and maintained or improved  Outcome: Progressing  Flowsheets (Taken 3/22/2024 1409)  Care Plan - Patient's Chronic Conditions and Co-Morbidity Symptoms are Monitored and Maintained or Improved:   Monitor and assess patient's chronic conditions and comorbid symptoms for stability, deterioration, or improvement   Collaborate with multidisciplinary team to address chronic and comorbid conditions and prevent exacerbation or deterioration   Update acute care plan with appropriate goals if chronic or comorbid symptoms are exacerbated and prevent overall improvement and discharge     
Supervision  Sit to Supine: Supervision  Scooting: Supervision  Transfers:     Transfer Training  Transfer Training: Yes  Sit to Stand: Contact-guard assistance  Stand to Sit: Contact-guard assistance  Balance:               Balance  Sitting: Intact  Standing: With support  Standing - Static: Good  Standing - Dynamic: Fair  Wheelchair Mobility:        Ambulation/Gait Training:                       Gait  Gait Training: Yes  Overall Level of Assistance: Contact-guard assistance  Distance (ft): 5 Feet  Assistive Device: Walker, rolling  Interventions: Verbal cues;Tactile cues  Speed/Marilee: Slow  Step Length: Left shortened;Right shortened  Stance: Left decreased  Gait Abnormalities: Decreased step clearance                    Pain:  Pain level pre-treatment: 7/10   Pain level post-treatment: 7/10   Pain Intervention(s): Medication (see MAR); Rest, Repositioning  Response to intervention: Nurse notified     Activity Tolerance:   Activity Tolerance: Patient limited by pain  Please refer to the flowsheet for vital signs taken during this treatment.    After treatment:   []         Patient left in no apparent distress sitting up in chair  [x]         Patient left in no apparent distress in bed  [x]         Call bell left within reach  [x]         Nursing notified  []         Caregiver present  []         Bed alarm activated  []         SCDs applied    COMMUNICATION/EDUCATION:   Patient Education  Education Given To: Patient  Education Provided: Role of Therapy;Plan of Care;Transfer Training;Fall Prevention Strategies  Education Method: Verbal;Teach Back  Barriers to Learning: None  Education Outcome: Verbalized understanding;Demonstrated understanding;Continued education needed    Thank you for this referral.  Keri Garcia, PT  Minutes: 19      Eval Complexity: Decision Making: Low Complexity

## 2024-03-27 ENCOUNTER — HOME HEALTH ADMISSION (OUTPATIENT)
Age: 74
End: 2024-03-27

## 2024-03-27 NOTE — HOME CARE
Received home health referral yesterday for [facility] for SN,PT,OT,HH Aide and MSW . Patient discharged yesterday 3/26/24;  HH referral was processed and completed by [facility] central Intake nurse Julia eHrnandez LPN to [facility] central Intake and scheduling .ANNE OLIVIER.

## 2024-03-29 ENCOUNTER — HOME CARE VISIT (OUTPATIENT)
Age: 74
End: 2024-03-29

## 2024-03-30 ENCOUNTER — HOME CARE VISIT (OUTPATIENT)
Age: 74
End: 2024-03-30

## 2024-04-01 ENCOUNTER — HOME CARE VISIT (OUTPATIENT)
Age: 74
End: 2024-04-01

## 2024-04-03 ENCOUNTER — HOME CARE VISIT (OUTPATIENT)
Age: 74
End: 2024-04-03

## 2024-04-05 ENCOUNTER — HOME CARE VISIT (OUTPATIENT)
Age: 74
End: 2024-04-05

## 2024-04-05 ENCOUNTER — HOSPITAL ENCOUNTER (EMERGENCY)
Facility: HOSPITAL | Age: 74
Discharge: HOME | DRG: 208 | End: 2024-04-05
Payer: MEDICARE

## 2024-04-05 ENCOUNTER — APPOINTMENT (OUTPATIENT)
Facility: HOSPITAL | Age: 74
DRG: 208 | End: 2024-04-05
Payer: MEDICARE

## 2024-04-05 VITALS
SYSTOLIC BLOOD PRESSURE: 117 MMHG | WEIGHT: 182 LBS | OXYGEN SATURATION: 94 % | DIASTOLIC BLOOD PRESSURE: 73 MMHG | HEIGHT: 64 IN | RESPIRATION RATE: 18 BRPM | BODY MASS INDEX: 31.07 KG/M2 | TEMPERATURE: 97 F | HEART RATE: 100 BPM

## 2024-04-05 DIAGNOSIS — M25.562 ACUTE PAIN OF BOTH KNEES: ICD-10-CM
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
## 2024-04-05 DIAGNOSIS — M17.12 PRIMARY OSTEOARTHRITIS OF LEFT KNEE: ICD-10-CM

## 2024-04-05 DIAGNOSIS — N18.6 STAGE 5 CHRONIC KIDNEY DISEASE ON CHRONIC DIALYSIS (HCC): ICD-10-CM

## 2024-04-05 DIAGNOSIS — M25.561 ACUTE PAIN OF BOTH KNEES: ICD-10-CM

## 2024-04-05 DIAGNOSIS — Z99.2 STAGE 5 CHRONIC KIDNEY DISEASE ON CHRONIC DIALYSIS (HCC): ICD-10-CM

## 2024-04-05 LAB
ALBUMIN SERPL-MCNC: 3.1 G/DL (ref 3.4–5)
ALBUMIN/GLOB SERPL: 1 (ref 0.8–1.7)
ALP SERPL-CCNC: 148 U/L (ref 45–117)
ALT SERPL-CCNC: 18 U/L (ref 13–56)
ANION GAP SERPL CALC-SCNC: 5 MMOL/L (ref 3–18)
AST SERPL-CCNC: 18 U/L (ref 10–38)
BASOPHILS # BLD: 0 K/UL (ref 0–0.1)
BASOPHILS NFR BLD: 0 % (ref 0–2)
BILIRUB SERPL-MCNC: 0.5 MG/DL (ref 0.2–1)
BUN SERPL-MCNC: 29 MG/DL (ref 7–18)
BUN/CREAT SERPL: 9 (ref 12–20)
CALCIUM SERPL-MCNC: 9.2 MG/DL (ref 8.5–10.1)
CHLORIDE SERPL-SCNC: 102 MMOL/L (ref 100–111)
CO2 SERPL-SCNC: 31 MMOL/L (ref 21–32)
CREAT SERPL-MCNC: 3.2 MG/DL (ref 0.6–1.3)
DIFFERENTIAL METHOD BLD: ABNORMAL
EOSINOPHIL # BLD: 0.1 K/UL (ref 0–0.4)
EOSINOPHIL NFR BLD: 1 % (ref 0–5)
ERYTHROCYTE [DISTWIDTH] IN BLOOD BY AUTOMATED COUNT: 16.4 % (ref 11.6–14.5)
GLOBULIN SER CALC-MCNC: 3.1 G/DL (ref 2–4)
GLUCOSE SERPL-MCNC: 105 MG/DL (ref 74–99)
HCT VFR BLD AUTO: 28.8 % (ref 35–45)
HGB BLD-MCNC: 8.7 G/DL (ref 12–16)
IMM GRANULOCYTES # BLD AUTO: 0.1 K/UL (ref 0–0.04)
IMM GRANULOCYTES NFR BLD AUTO: 1 % (ref 0–0.5)
LYMPHOCYTES # BLD: 0.8 K/UL (ref 0.9–3.6)
LYMPHOCYTES NFR BLD: 14 % (ref 21–52)
MCH RBC QN AUTO: 29.7 PG (ref 24–34)
MCHC RBC AUTO-ENTMCNC: 30.2 G/DL (ref 31–37)
MCV RBC AUTO: 98.3 FL (ref 78–100)
MONOCYTES # BLD: 0.7 K/UL (ref 0.05–1.2)
MONOCYTES NFR BLD: 11 % (ref 3–10)
NEUTS SEG # BLD: 4.4 K/UL (ref 1.8–8)
NEUTS SEG NFR BLD: 73 % (ref 40–73)
NRBC # BLD: 0.04 K/UL (ref 0–0.01)
NRBC BLD-RTO: 0.7 PER 100 WBC
PLATELET # BLD AUTO: 284 K/UL (ref 135–420)
PMV BLD AUTO: 9.4 FL (ref 9.2–11.8)
POTASSIUM SERPL-SCNC: 3.9 MMOL/L (ref 3.5–5.5)
PROT SERPL-MCNC: 6.2 G/DL (ref 6.4–8.2)
RBC # BLD AUTO: 2.93 M/UL (ref 4.2–5.3)
SODIUM SERPL-SCNC: 138 MMOL/L (ref 136–145)
WBC # BLD AUTO: 6.1 K/UL (ref 4.6–13.2)

## 2024-04-05 PROCEDURE — 6370000000 HC RX 637 (ALT 250 FOR IP): Performed by: PHYSICIAN ASSISTANT

## 2024-04-05 PROCEDURE — 80053 COMPREHEN METABOLIC PANEL: CPT

## 2024-04-05 PROCEDURE — 85025 COMPLETE CBC W/AUTO DIFF WBC: CPT

## 2024-04-05 PROCEDURE — 90935 HEMODIALYSIS ONE EVALUATION: CPT

## 2024-04-05 PROCEDURE — 73562 X-RAY EXAM OF KNEE 3: CPT

## 2024-04-05 RX ORDER — LIDOCAINE 50 MG/G
1 PATCH TOPICAL DAILY
Qty: 10 PATCH | Refills: 0 | Status: ON HOLD | OUTPATIENT
Start: 2024-04-05 | End: 2024-04-18 | Stop reason: HOSPADM

## 2024-04-05 RX ORDER — ACETAMINOPHEN 325 MG/1
650 TABLET ORAL
Status: COMPLETED | OUTPATIENT
Start: 2024-04-05 | End: 2024-04-05

## 2024-04-05 RX ADMIN — ACETAMINOPHEN 325MG 650 MG: 325 TABLET ORAL at 13:37

## 2024-04-05 ASSESSMENT — PAIN DESCRIPTION - DESCRIPTORS
DESCRIPTORS: ACHING
DESCRIPTORS: ACHING

## 2024-04-05 ASSESSMENT — PAIN DESCRIPTION - LOCATION
LOCATION: KNEE
LOCATION: KNEE

## 2024-04-05 ASSESSMENT — PAIN DESCRIPTION - ORIENTATION
ORIENTATION: RIGHT;LEFT
ORIENTATION: RIGHT;LEFT

## 2024-04-05 ASSESSMENT — PAIN DESCRIPTION - PAIN TYPE: TYPE: ACUTE PAIN

## 2024-04-05 ASSESSMENT — PAIN SCALES - GENERAL
PAINLEVEL_OUTOF10: 10
PAINLEVEL_OUTOF10: 10

## 2024-04-05 ASSESSMENT — PAIN - FUNCTIONAL ASSESSMENT: PAIN_FUNCTIONAL_ASSESSMENT: 0-10

## 2024-04-05 ASSESSMENT — PAIN DESCRIPTION - FREQUENCY: FREQUENCY: CONTINUOUS

## 2024-04-05 NOTE — ED PROVIDER NOTES
7:43 PM  Patient has completed dialysis feels well ready to go home will provide Ortho follow-up as well as her next session of dialysis with [facility].- jesus, Lulu Major PA  04/05/24 1943    
symmetric bilaterally.  Will get x-ray of the left knee and basic labs to see if patient is still eligible for dialysis today or able to wait until Monday.    ED Course as of 04/05/24 1851 Fri Apr 05, 2024   1341 X-ray unremarkable for fracture osteoarthritis noted.  Will consult nephrology to determine when patient can get dialysis. []   1417 CONSULT NOTE:  2:17 PM  Lorenza Joy PA-C spoke with Dr. Suarez, Consult for Nephrology. Discussed available diagnostic tests and clinical findings. He is in agreement with care plans as outlined. He states if pt needs urgent dialysis she can be put in obs but otherwise she can call to get dialysis tomorrow.   [AH]   1419 Dialysis apparently came to get pt and she is currently getting dialyzed. []   1850 Care turned over to colleague, Lulu ASCENCIO  to follow up when patient returns from dialysis   []      ED Course User Index  [] Lorenza Joy PA-C       CLINICAL MANAGEMENT TOOLS:  Not Applicable    FINAL IMPRESSION     1. Ground-level fall    2. Acute pain of both knees    3. Primary osteoarthritis of left knee    4. Stage 5 chronic kidney disease on chronic dialysis (HCC)          DISPOSITION/PLAN   DISPOSITION Decision To Discharge 04/05/2024 07:42:09 PM      Discharge Note: The patient is stable for discharge home. The signs, symptoms, diagnosis, and discharge instructions have been discussed, understanding conveyed, and agreed upon. The patient is to follow up as recommended or return to ER should their symptoms worsen.      PATIENT REFERRED TO:  Kalli Louise, APRN - NP  [address] [facility] [address]  [phone]             DISCHARGE MEDICATIONS:     Medication List        START taking these medications      lidocaine 5 %  Commonly known as: LIDODERM  Place 1 patch onto the skin daily for 10 days 12 hours on, 12 hours off.            CONTINUE taking these medications      Oxygen Concentrator            ASK your doctor about these

## 2024-04-05 NOTE — ED TRIAGE NOTES
Bilateral knee pain s/p fall at dialysis center, left knee hurts worse, pt takes dialysis M-W-F, but did not have it today due to fall prior to start. Pt arrived via stretcher , and was transferred to ED bed by EMS, pt has dialysis site on [facility]

## 2024-04-05 NOTE — DIALYSIS
HD Care plan  Time: 3  hrs  Dialysate:  2 K+   2.5 Ca++  Bath  Net UF: 3 L  Access: Aseptically care for MELL AVF  Hemodynamic stability: Maintain BP WNL     Pre Dialysis:  Pt received from ER Nurse Janel Short , pt on a bed, A+O X 4, no s/s of acute distress noted. MELL AVF assessed, no abnormalities noted, bruit and thrill strong, AVF accessed using 15 G needles without any difficulty. Good flows achieved from both needles.    Intra Dialysis:  Time out / safety process performed per policy, Tx initiated at 1426.    AVF flowing with ease. For hemodynamic stability UF goal set at 3000 ml as tolerated.  Pt offered assistance with repositioning every 2 hours/prn    Vascular access visible and line connections remained intact throughout entire duration of treatment.   Vital signs checked every 15 mins.  1615:  very high despite troubleshooting and adjusting the needle only achieving BFR of upto 250, Dr Márquez updated  Pt c/o cramping lower limbs UF paused,Unable to achieve prescribed UF, Nephrologist informed.     Post Dialysis:  Tx completed at 1730,   Tolerated well , 1959 mls  removed. De-accessed per protocol.    Clot time 5 minutes for arterial, and 5 minutes for venous.   Dry dressings applied.  Bruit/Thrill present above and below dressings.  Post Dialysis report given to ER Nurse janel

## 2024-04-06 ENCOUNTER — APPOINTMENT (OUTPATIENT)
Facility: HOSPITAL | Age: 74
DRG: 208 | End: 2024-04-06
Payer: MEDICARE

## 2024-04-06 ENCOUNTER — HOSPITAL ENCOUNTER (INPATIENT)
Facility: HOSPITAL | Age: 74
LOS: 31 days | Discharge: NURSING, REHAB OR LONG-TERM CARE | DRG: 208 | End: 2024-05-07
Attending: EMERGENCY MEDICINE | Admitting: STUDENT IN AN ORGANIZED HEALTH CARE EDUCATION/TRAINING PROGRAM
Payer: MEDICARE

## 2024-04-06 DIAGNOSIS — I42.9 CARDIOMYOPATHY, UNSPECIFIED TYPE (HCC): ICD-10-CM

## 2024-04-06 DIAGNOSIS — I50.33 ACUTE ON CHRONIC DIASTOLIC (CONGESTIVE) HEART FAILURE (HCC): ICD-10-CM

## 2024-04-06 DIAGNOSIS — N18.6 END-STAGE RENAL DISEASE ON HEMODIALYSIS (HCC): Primary | Chronic | ICD-10-CM

## 2024-04-06 DIAGNOSIS — N18.6 END STAGE RENAL DISEASE (HCC): ICD-10-CM

## 2024-04-06 DIAGNOSIS — N18.6 END STAGE RENAL DISEASE ON DIALYSIS (HCC): ICD-10-CM

## 2024-04-06 DIAGNOSIS — J96.01 ACUTE RESPIRATORY FAILURE WITH HYPOXIA (HCC): ICD-10-CM

## 2024-04-06 DIAGNOSIS — F17.210 SMOKING GREATER THAN 20 PACK YEARS: ICD-10-CM

## 2024-04-06 DIAGNOSIS — I82.511 CHRONIC DEEP VEIN THROMBOSIS (DVT) OF FEMORAL VEIN OF RIGHT LOWER EXTREMITY (HCC): ICD-10-CM

## 2024-04-06 DIAGNOSIS — Z99.2 DIALYSIS PATIENT (HCC): ICD-10-CM

## 2024-04-06 DIAGNOSIS — J44.1 COPD EXACERBATION (HCC): ICD-10-CM

## 2024-04-06 DIAGNOSIS — T82.9XXA COMPLICATION OF VASCULAR ACCESS FOR DIALYSIS: ICD-10-CM

## 2024-04-06 DIAGNOSIS — Z99.2 END-STAGE RENAL DISEASE ON HEMODIALYSIS (HCC): Primary | Chronic | ICD-10-CM

## 2024-04-06 DIAGNOSIS — Z99.2 END STAGE RENAL DISEASE ON DIALYSIS (HCC): ICD-10-CM

## 2024-04-06 DIAGNOSIS — J96.11 CHRONIC HYPOXIC RESPIRATORY FAILURE (HCC): ICD-10-CM

## 2024-04-06 PROBLEM — J81.0 FLASH PULMONARY EDEMA (HCC): Status: RESOLVED | Noted: 2019-10-18 | Resolved: 2024-04-06

## 2024-04-06 PROBLEM — K21.9 GERD WITHOUT ESOPHAGITIS: Status: ACTIVE | Noted: 2020-07-09

## 2024-04-06 PROBLEM — M79.10 MYALGIA: Status: RESOLVED | Noted: 2024-03-08 | Resolved: 2024-04-06

## 2024-04-06 PROBLEM — I21.4 NSTEMI (NON-ST ELEVATED MYOCARDIAL INFARCTION) (HCC): Status: RESOLVED | Noted: 2018-05-20 | Resolved: 2024-04-06

## 2024-04-06 PROBLEM — E11.65 TYPE 2 DIABETES MELLITUS WITH HYPERGLYCEMIA, WITH LONG-TERM CURRENT USE OF INSULIN (HCC): Chronic | Status: ACTIVE | Noted: 2018-07-12

## 2024-04-06 PROBLEM — R55 SYNCOPE: Status: RESOLVED | Noted: 2019-10-18 | Resolved: 2024-04-06

## 2024-04-06 PROBLEM — N18.32 ACUTE RENAL FAILURE SUPERIMPOSED ON STAGE 3B CHRONIC KIDNEY DISEASE (HCC): Status: RESOLVED | Noted: 2023-08-06 | Resolved: 2024-04-06

## 2024-04-06 PROBLEM — N18.32 STAGE 3B CHRONIC KIDNEY DISEASE (HCC): Status: RESOLVED | Noted: 2022-08-08 | Resolved: 2024-04-06

## 2024-04-06 PROBLEM — R06.09 EXERTIONAL DYSPNEA: Status: RESOLVED | Noted: 2023-12-14 | Resolved: 2024-04-06

## 2024-04-06 PROBLEM — R79.89 ELEVATED D-DIMER: Status: RESOLVED | Noted: 2019-10-18 | Resolved: 2024-04-06

## 2024-04-06 PROBLEM — M10.9 GOUT: Status: ACTIVE | Noted: 2019-11-14

## 2024-04-06 PROBLEM — R09.02 HYPOXIA: Status: RESOLVED | Noted: 2024-01-29 | Resolved: 2024-04-06

## 2024-04-06 PROBLEM — E87.20 LACTIC ACIDOSIS: Status: RESOLVED | Noted: 2023-08-06 | Resolved: 2024-04-06

## 2024-04-06 PROBLEM — J44.9 COPD, MILD (HCC): Status: RESOLVED | Noted: 2024-01-29 | Resolved: 2024-04-06

## 2024-04-06 PROBLEM — E66.01 SEVERE OBESITY (BMI 35.0-39.9) WITH COMORBIDITY (HCC): Status: RESOLVED | Noted: 2018-07-12 | Resolved: 2024-04-06

## 2024-04-06 PROBLEM — R00.0 SINUS TACHYCARDIA: Status: RESOLVED | Noted: 2023-01-13 | Resolved: 2024-04-06

## 2024-04-06 PROBLEM — U07.1 COVID-19: Status: RESOLVED | Noted: 2024-04-06 | Resolved: 2024-04-06

## 2024-04-06 PROBLEM — Z63.6 CAREGIVER STRESS: Status: RESOLVED | Noted: 2017-04-26 | Resolved: 2024-04-06

## 2024-04-06 PROBLEM — R06.02 SHORTNESS OF BREATH: Status: RESOLVED | Noted: 2024-03-04 | Resolved: 2024-04-06

## 2024-04-06 PROBLEM — N17.9 ACUTE RENAL FAILURE SUPERIMPOSED ON STAGE 3B CHRONIC KIDNEY DISEASE (HCC): Status: RESOLVED | Noted: 2023-08-06 | Resolved: 2024-04-06

## 2024-04-06 PROBLEM — K92.2 GI BLEED: Status: RESOLVED | Noted: 2023-12-23 | Resolved: 2024-04-06

## 2024-04-06 PROBLEM — G56.22 ULNAR NEUROPATHY AT ELBOW OF LEFT UPPER EXTREMITY: Status: ACTIVE | Noted: 2024-04-06

## 2024-04-06 PROBLEM — U07.1 PNEUMONIA DUE TO COVID-19 VIRUS: Status: RESOLVED | Noted: 2022-08-08 | Resolved: 2024-04-06

## 2024-04-06 PROBLEM — I50.9 ACUTE EXACERBATION OF CHF (CONGESTIVE HEART FAILURE) (HCC): Status: RESOLVED | Noted: 2022-07-08 | Resolved: 2024-04-06

## 2024-04-06 PROBLEM — E66.01 SEVERE OBESITY (BMI 35.0-35.9 WITH COMORBIDITY) (HCC): Status: RESOLVED | Noted: 2024-01-29 | Resolved: 2024-04-06

## 2024-04-06 PROBLEM — Z71.89 GOALS OF CARE, COUNSELING/DISCUSSION: Status: RESOLVED | Noted: 2024-03-04 | Resolved: 2024-04-06

## 2024-04-06 PROBLEM — R06.00 DYSPNEA AND RESPIRATORY ABNORMALITIES: Status: RESOLVED | Noted: 2023-12-14 | Resolved: 2024-04-06

## 2024-04-06 PROBLEM — E55.9 VITAMIN D DEFICIENCY: Status: ACTIVE | Noted: 2023-10-24

## 2024-04-06 PROBLEM — J96.90 RESPIRATORY FAILURE REQUIRING INTUBATION (HCC): Status: RESOLVED | Noted: 2019-10-19 | Resolved: 2024-04-06

## 2024-04-06 PROBLEM — I47.10 PAROXYSMAL SUPRAVENTRICULAR TACHYCARDIA (HCC): Status: RESOLVED | Noted: 2024-04-06 | Resolved: 2024-04-06

## 2024-04-06 PROBLEM — K92.1 MELENA: Status: RESOLVED | Noted: 2023-12-23 | Resolved: 2024-04-06

## 2024-04-06 PROBLEM — D63.1 ANEMIA DUE TO CHRONIC KIDNEY DISEASE, ON CHRONIC DIALYSIS (HCC): Chronic | Status: ACTIVE | Noted: 2023-01-13

## 2024-04-06 PROBLEM — N95.0 POSTMENOPAUSAL BLEEDING: Status: RESOLVED | Noted: 2020-07-09 | Resolved: 2024-04-06

## 2024-04-06 PROBLEM — J81.1 PULMONARY EDEMA: Status: RESOLVED | Noted: 2018-06-08 | Resolved: 2024-04-06

## 2024-04-06 PROBLEM — G56.20 LESION OF ULNAR NERVE: Status: ACTIVE | Noted: 2020-07-09

## 2024-04-06 PROBLEM — G47.33 OSA ON CPAP: Chronic | Status: ACTIVE | Noted: 2019-08-26

## 2024-04-06 PROBLEM — N17.9 ACUTE KIDNEY INJURY SUPERIMPOSED ON CKD (HCC): Status: RESOLVED | Noted: 2023-08-06 | Resolved: 2024-04-06

## 2024-04-06 PROBLEM — A41.9 SEPSIS (HCC): Status: RESOLVED | Noted: 2021-03-18 | Resolved: 2024-04-06

## 2024-04-06 PROBLEM — I25.110 CORONARY ARTERY DISEASE INVOLVING NATIVE CORONARY ARTERY WITH UNSTABLE ANGINA PECTORIS (HCC): Status: RESOLVED | Noted: 2018-06-09 | Resolved: 2024-04-06

## 2024-04-06 PROBLEM — R06.02 SOB (SHORTNESS OF BREATH): Status: RESOLVED | Noted: 2018-06-09 | Resolved: 2024-04-06

## 2024-04-06 PROBLEM — E87.5 ACUTE HYPERKALEMIA: Status: RESOLVED | Noted: 2022-06-26 | Resolved: 2024-04-06

## 2024-04-06 PROBLEM — I95.9 HYPOTENSION: Status: RESOLVED | Noted: 2021-03-19 | Resolved: 2024-04-06

## 2024-04-06 PROBLEM — I50.1 PULMONARY EDEMA CARDIAC CAUSE (HCC): Status: RESOLVED | Noted: 2019-10-19 | Resolved: 2024-04-06

## 2024-04-06 PROBLEM — I50.43 ACUTE ON CHRONIC HEART FAILURE WITH REDUCED EJECTION FRACTION AND DIASTOLIC DYSFUNCTION (HCC): Status: RESOLVED | Noted: 2023-08-05 | Resolved: 2024-04-06

## 2024-04-06 PROBLEM — K92.1 GASTROINTESTINAL HEMORRHAGE WITH MELENA: Status: RESOLVED | Noted: 2024-03-20 | Resolved: 2024-04-06

## 2024-04-06 PROBLEM — R42 DIZZINESS: Status: RESOLVED | Noted: 2022-12-09 | Resolved: 2024-04-06

## 2024-04-06 PROBLEM — N17.9 STAGE 1 ACUTE KIDNEY INJURY (HCC): Status: RESOLVED | Noted: 2021-12-18 | Resolved: 2024-04-06

## 2024-04-06 PROBLEM — I50.9 ACUTE DECOMPENSATED HEART FAILURE (HCC): Status: RESOLVED | Noted: 2024-03-01 | Resolved: 2024-04-06

## 2024-04-06 PROBLEM — I16.1 HYPERTENSIVE EMERGENCY: Chronic | Status: RESOLVED | Noted: 2022-07-27 | Resolved: 2024-04-06

## 2024-04-06 PROBLEM — E87.5 HYPERKALEMIA: Status: RESOLVED | Noted: 2022-03-07 | Resolved: 2024-04-06

## 2024-04-06 PROBLEM — J81.0 ACUTE PULMONARY EDEMA (HCC): Status: RESOLVED | Noted: 2018-05-20 | Resolved: 2024-04-06

## 2024-04-06 PROBLEM — Z79.4 TYPE 2 DIABETES MELLITUS WITH HYPERGLYCEMIA, WITH LONG-TERM CURRENT USE OF INSULIN (HCC): Chronic | Status: ACTIVE | Noted: 2018-07-12

## 2024-04-06 PROBLEM — J12.82 PNEUMONIA DUE TO COVID-19 VIRUS: Status: RESOLVED | Noted: 2022-08-08 | Resolved: 2024-04-06

## 2024-04-06 PROBLEM — I50.23 ACUTE ON CHRONIC SYSTOLIC HEART FAILURE (HCC): Status: RESOLVED | Noted: 2019-11-07 | Resolved: 2024-04-06

## 2024-04-06 PROBLEM — K92.2 UGIB (UPPER GASTROINTESTINAL BLEED): Status: RESOLVED | Noted: 2024-03-20 | Resolved: 2024-04-06

## 2024-04-06 PROBLEM — N18.9 ACUTE KIDNEY INJURY SUPERIMPOSED ON CKD (HCC): Status: RESOLVED | Noted: 2023-08-06 | Resolved: 2024-04-06

## 2024-04-06 PROBLEM — N25.81 SECONDARY HYPERPARATHYROIDISM OF RENAL ORIGIN (HCC): Chronic | Status: ACTIVE | Noted: 2023-10-24

## 2024-04-06 PROBLEM — R93.89 THICKENED ENDOMETRIUM: Status: ACTIVE | Noted: 2020-07-09

## 2024-04-06 PROBLEM — G45.9 TIA (TRANSIENT ISCHEMIC ATTACK): Status: RESOLVED | Noted: 2022-12-09 | Resolved: 2024-04-06

## 2024-04-06 PROBLEM — I77.0 ACQUIRED ARTERIOVENOUS ANEURYSM (HCC): Status: ACTIVE | Noted: 2024-04-06

## 2024-04-06 PROBLEM — R06.89 DYSPNEA AND RESPIRATORY ABNORMALITIES: Status: RESOLVED | Noted: 2023-12-14 | Resolved: 2024-04-06

## 2024-04-06 PROBLEM — J41.8 MIXED SIMPLE AND MUCOPURULENT CHRONIC BRONCHITIS (HCC): Status: RESOLVED | Noted: 2024-04-06 | Resolved: 2024-04-06

## 2024-04-06 PROBLEM — R06.03 RESPIRATORY DISTRESS: Status: RESOLVED | Noted: 2023-08-09 | Resolved: 2024-04-06

## 2024-04-06 LAB
ANION GAP BLD CALC-SCNC: ABNORMAL MMOL/L (ref 10–20)
ANION GAP SERPL CALC-SCNC: 8 MMOL/L (ref 3–18)
ARTERIAL PATENCY WRIST A: ABNORMAL
BASE EXCESS BLD CALC-SCNC: 2.9 MMOL/L
BASOPHILS # BLD: 0 K/UL (ref 0–0.1)
BASOPHILS NFR BLD: 0 % (ref 0–2)
BDY SITE: ABNORMAL
BUN SERPL-MCNC: 23 MG/DL (ref 7–18)
BUN/CREAT SERPL: 8 (ref 12–20)
CA-I BLD-MCNC: 1.18 MMOL/L (ref 1.12–1.32)
CALCIUM SERPL-MCNC: 9.1 MG/DL (ref 8.5–10.1)
CHLORIDE BLD-SCNC: 102 MMOL/L (ref 98–107)
CHLORIDE SERPL-SCNC: 102 MMOL/L (ref 100–111)
CO2 BLD-SCNC: 26 MMOL/L (ref 19–24)
CO2 SERPL-SCNC: 29 MMOL/L (ref 21–32)
CREAT BLD-MCNC: 3.42 MG/DL (ref 0.6–1.3)
CREAT SERPL-MCNC: 3.03 MG/DL (ref 0.6–1.3)
D DIMER PPP FEU-MCNC: 2.4 UG/ML(FEU)
DIFFERENTIAL METHOD BLD: ABNORMAL
EKG ATRIAL RATE: 127 BPM
EKG DIAGNOSIS: NORMAL
EKG P AXIS: 92 DEGREES
EKG P-R INTERVAL: 158 MS
EKG Q-T INTERVAL: 296 MS
EKG QRS DURATION: 72 MS
EKG QTC CALCULATION (BAZETT): 430 MS
EKG R AXIS: 40 DEGREES
EKG T AXIS: 118 DEGREES
EKG VENTRICULAR RATE: 127 BPM
EOSINOPHIL # BLD: 0 K/UL (ref 0–0.4)
EOSINOPHIL NFR BLD: 0 % (ref 0–5)
ERYTHROCYTE [DISTWIDTH] IN BLOOD BY AUTOMATED COUNT: 16.5 % (ref 11.6–14.5)
FIO2 ON VENT: 80 %
FLUAV RNA SPEC QL NAA+PROBE: NOT DETECTED
FLUBV RNA SPEC QL NAA+PROBE: NOT DETECTED
GAS FLOW.O2 O2 DELIVERY SYS: ABNORMAL
GLUCOSE BLD STRIP.AUTO-MCNC: 246 MG/DL (ref 70–110)
GLUCOSE BLD STRIP.AUTO-MCNC: 254 MG/DL (ref 70–110)
GLUCOSE BLD-MCNC: 160 MG/DL (ref 65–100)
GLUCOSE SERPL-MCNC: 171 MG/DL (ref 74–99)
HCO3 BLD-SCNC: 26.7 MMOL/L (ref 22–26)
HCT VFR BLD AUTO: 31.3 % (ref 35–45)
HGB BLD-MCNC: 9.6 G/DL (ref 12–16)
IMM GRANULOCYTES # BLD AUTO: 0.1 K/UL (ref 0–0.04)
IMM GRANULOCYTES NFR BLD AUTO: 1 % (ref 0–0.5)
LACTATE BLD-SCNC: 1.33 MMOL/L (ref 0.4–2)
LACTATE SERPL-SCNC: 2.7 MMOL/L (ref 0.4–2)
LACTATE SERPL-SCNC: 7.1 MMOL/L (ref 0.4–2)
LYMPHOCYTES # BLD: 0.7 K/UL (ref 0.9–3.6)
LYMPHOCYTES NFR BLD: 8 % (ref 21–52)
MAGNESIUM SERPL-MCNC: 1.7 MG/DL (ref 1.6–2.6)
MCH RBC QN AUTO: 29.7 PG (ref 24–34)
MCHC RBC AUTO-ENTMCNC: 30.7 G/DL (ref 31–37)
MCV RBC AUTO: 96.9 FL (ref 78–100)
MONOCYTES # BLD: 0.7 K/UL (ref 0.05–1.2)
MONOCYTES NFR BLD: 8 % (ref 3–10)
NEUTS SEG # BLD: 7.1 K/UL (ref 1.8–8)
NEUTS SEG NFR BLD: 82 % (ref 40–73)
NRBC # BLD: 0.07 K/UL (ref 0–0.01)
NRBC BLD-RTO: 0.8 PER 100 WBC
PCO2 BLD: 37 MMHG (ref 35–45)
PH BLD: 7.47 (ref 7.35–7.45)
PLATELET # BLD AUTO: 303 K/UL (ref 135–420)
PMV BLD AUTO: 10.1 FL (ref 9.2–11.8)
PO2 BLD: 373 MMHG (ref 80–100)
POTASSIUM BLD-SCNC: 3.9 MMOL/L (ref 3.5–5.1)
POTASSIUM SERPL-SCNC: 4.2 MMOL/L (ref 3.5–5.5)
RBC # BLD AUTO: 3.23 M/UL (ref 4.2–5.3)
RESPIRATORY RATE, POC: 25 (ref 5–40)
SAO2 % BLD: 100 %
SARS-COV-2 RNA RESP QL NAA+PROBE: NOT DETECTED
SERVICE CMNT-IMP: ABNORMAL
SODIUM BLD-SCNC: 135 MMOL/L (ref 136–145)
SODIUM SERPL-SCNC: 139 MMOL/L (ref 136–145)
SPECIMEN SITE: ABNORMAL
TROPONIN I SERPL HS-MCNC: 56 NG/L (ref 0–54)
TROPONIN I SERPL HS-MCNC: 71 NG/L (ref 0–54)
TROPONIN I SERPL HS-MCNC: 73 NG/L (ref 0–54)
WBC # BLD AUTO: 8.7 K/UL (ref 4.6–13.2)

## 2024-04-06 PROCEDURE — 6360000002 HC RX W HCPCS: Performed by: INTERNAL MEDICINE

## 2024-04-06 PROCEDURE — 36415 COLL VENOUS BLD VENIPUNCTURE: CPT

## 2024-04-06 PROCEDURE — 82947 ASSAY GLUCOSE BLOOD QUANT: CPT

## 2024-04-06 PROCEDURE — P9047 ALBUMIN (HUMAN), 25%, 50ML: HCPCS | Performed by: INTERNAL MEDICINE

## 2024-04-06 PROCEDURE — 99285 EMERGENCY DEPT VISIT HI MDM: CPT

## 2024-04-06 PROCEDURE — 6360000002 HC RX W HCPCS: Performed by: STUDENT IN AN ORGANIZED HEALTH CARE EDUCATION/TRAINING PROGRAM

## 2024-04-06 PROCEDURE — 93010 ELECTROCARDIOGRAM REPORT: CPT | Performed by: INTERNAL MEDICINE

## 2024-04-06 PROCEDURE — 2700000000 HC OXYGEN THERAPY PER DAY

## 2024-04-06 PROCEDURE — 36600 WITHDRAWAL OF ARTERIAL BLOOD: CPT

## 2024-04-06 PROCEDURE — 84295 ASSAY OF SERUM SODIUM: CPT

## 2024-04-06 PROCEDURE — 84132 ASSAY OF SERUM POTASSIUM: CPT

## 2024-04-06 PROCEDURE — 82962 GLUCOSE BLOOD TEST: CPT

## 2024-04-06 PROCEDURE — 6370000000 HC RX 637 (ALT 250 FOR IP): Performed by: STUDENT IN AN ORGANIZED HEALTH CARE EDUCATION/TRAINING PROGRAM

## 2024-04-06 PROCEDURE — 84484 ASSAY OF TROPONIN QUANT: CPT

## 2024-04-06 PROCEDURE — 83735 ASSAY OF MAGNESIUM: CPT

## 2024-04-06 PROCEDURE — 93005 ELECTROCARDIOGRAM TRACING: CPT | Performed by: INTERNAL MEDICINE

## 2024-04-06 PROCEDURE — 5A09357 ASSISTANCE WITH RESPIRATORY VENTILATION, LESS THAN 24 CONSECUTIVE HOURS, CONTINUOUS POSITIVE AIRWAY PRESSURE: ICD-10-PCS | Performed by: STUDENT IN AN ORGANIZED HEALTH CARE EDUCATION/TRAINING PROGRAM

## 2024-04-06 PROCEDURE — 1100000003 HC PRIVATE W/ TELEMETRY

## 2024-04-06 PROCEDURE — 82803 BLOOD GASES ANY COMBINATION: CPT

## 2024-04-06 PROCEDURE — 90935 HEMODIALYSIS ONE EVALUATION: CPT

## 2024-04-06 PROCEDURE — 71045 X-RAY EXAM CHEST 1 VIEW: CPT

## 2024-04-06 PROCEDURE — 85014 HEMATOCRIT: CPT

## 2024-04-06 PROCEDURE — 99223 1ST HOSP IP/OBS HIGH 75: CPT | Performed by: STUDENT IN AN ORGANIZED HEALTH CARE EDUCATION/TRAINING PROGRAM

## 2024-04-06 PROCEDURE — 93005 ELECTROCARDIOGRAM TRACING: CPT | Performed by: EMERGENCY MEDICINE

## 2024-04-06 PROCEDURE — 82330 ASSAY OF CALCIUM: CPT

## 2024-04-06 PROCEDURE — 2580000003 HC RX 258: Performed by: EMERGENCY MEDICINE

## 2024-04-06 PROCEDURE — 94640 AIRWAY INHALATION TREATMENT: CPT

## 2024-04-06 PROCEDURE — 94761 N-INVAS EAR/PLS OXIMETRY MLT: CPT

## 2024-04-06 PROCEDURE — 85379 FIBRIN DEGRADATION QUANT: CPT

## 2024-04-06 PROCEDURE — 96374 THER/PROPH/DIAG INJ IV PUSH: CPT

## 2024-04-06 PROCEDURE — 6360000002 HC RX W HCPCS: Performed by: EMERGENCY MEDICINE

## 2024-04-06 PROCEDURE — 83880 ASSAY OF NATRIURETIC PEPTIDE: CPT

## 2024-04-06 PROCEDURE — 83605 ASSAY OF LACTIC ACID: CPT

## 2024-04-06 PROCEDURE — 80048 BASIC METABOLIC PNL TOTAL CA: CPT

## 2024-04-06 PROCEDURE — 94660 CPAP INITIATION&MGMT: CPT

## 2024-04-06 PROCEDURE — 2580000003 HC RX 258: Performed by: STUDENT IN AN ORGANIZED HEALTH CARE EDUCATION/TRAINING PROGRAM

## 2024-04-06 PROCEDURE — 87636 SARSCOV2 & INF A&B AMP PRB: CPT

## 2024-04-06 PROCEDURE — 85025 COMPLETE CBC W/AUTO DIFF WBC: CPT

## 2024-04-06 RX ORDER — FAMOTIDINE 20 MG/1
10 TABLET, FILM COATED ORAL DAILY
Status: DISCONTINUED | OUTPATIENT
Start: 2024-04-06 | End: 2024-04-06

## 2024-04-06 RX ORDER — HEPARIN SODIUM 5000 [USP'U]/ML
5000 INJECTION, SOLUTION INTRAVENOUS; SUBCUTANEOUS EVERY 8 HOURS SCHEDULED
Status: DISCONTINUED | OUTPATIENT
Start: 2024-04-06 | End: 2024-04-06

## 2024-04-06 RX ORDER — FOLIC ACID 1 MG/1
1 TABLET ORAL DAILY
Status: DISCONTINUED | OUTPATIENT
Start: 2024-04-06 | End: 2024-05-07 | Stop reason: HOSPADM

## 2024-04-06 RX ORDER — ACETAMINOPHEN 650 MG/1
650 SUPPOSITORY RECTAL EVERY 6 HOURS PRN
Status: DISCONTINUED | OUTPATIENT
Start: 2024-04-06 | End: 2024-05-07 | Stop reason: HOSPADM

## 2024-04-06 RX ORDER — EZETIMIBE 10 MG/1
10 TABLET ORAL DAILY
Status: DISCONTINUED | OUTPATIENT
Start: 2024-04-06 | End: 2024-05-07 | Stop reason: HOSPADM

## 2024-04-06 RX ORDER — ALBUTEROL SULFATE 1.25 MG/3ML
2.5 SOLUTION RESPIRATORY (INHALATION)
Status: COMPLETED | OUTPATIENT
Start: 2024-04-06 | End: 2024-04-06

## 2024-04-06 RX ORDER — CHOLECALCIFEROL (VITAMIN D3) 50 MCG
1 TABLET ORAL DAILY
COMMUNITY
Start: 2023-10-24

## 2024-04-06 RX ORDER — TRAMADOL HYDROCHLORIDE 50 MG/1
50 TABLET ORAL EVERY 12 HOURS PRN
Status: DISCONTINUED | OUTPATIENT
Start: 2024-04-06 | End: 2024-04-09 | Stop reason: SDUPTHER

## 2024-04-06 RX ORDER — LANOLIN ALCOHOL/MO/W.PET/CERES
1000 CREAM (GRAM) TOPICAL DAILY
Status: DISCONTINUED | OUTPATIENT
Start: 2024-04-06 | End: 2024-05-07 | Stop reason: HOSPADM

## 2024-04-06 RX ORDER — MAGNESIUM SULFATE IN WATER 40 MG/ML
2000 INJECTION, SOLUTION INTRAVENOUS ONCE
Status: COMPLETED | OUTPATIENT
Start: 2024-04-06 | End: 2024-04-07

## 2024-04-06 RX ORDER — HEPARIN SODIUM 10000 [USP'U]/100ML
5-30 INJECTION, SOLUTION INTRAVENOUS CONTINUOUS
Status: DISCONTINUED | OUTPATIENT
Start: 2024-04-07 | End: 2024-04-08

## 2024-04-06 RX ORDER — ACETAMINOPHEN 325 MG/1
650 TABLET ORAL EVERY 6 HOURS PRN
Status: DISCONTINUED | OUTPATIENT
Start: 2024-04-06 | End: 2024-05-07 | Stop reason: HOSPADM

## 2024-04-06 RX ORDER — DEXTROSE MONOHYDRATE 100 MG/ML
INJECTION, SOLUTION INTRAVENOUS CONTINUOUS PRN
Status: DISCONTINUED | OUTPATIENT
Start: 2024-04-06 | End: 2024-05-07 | Stop reason: HOSPADM

## 2024-04-06 RX ORDER — HEPARIN SODIUM 1000 [USP'U]/ML
80 INJECTION, SOLUTION INTRAVENOUS; SUBCUTANEOUS PRN
Status: DISCONTINUED | OUTPATIENT
Start: 2024-04-06 | End: 2024-04-08

## 2024-04-06 RX ORDER — ONDANSETRON 2 MG/ML
4 INJECTION INTRAMUSCULAR; INTRAVENOUS EVERY 6 HOURS PRN
Status: DISCONTINUED | OUTPATIENT
Start: 2024-04-06 | End: 2024-05-07 | Stop reason: HOSPADM

## 2024-04-06 RX ORDER — MIDODRINE HYDROCHLORIDE 10 MG/1
10 TABLET ORAL 3 TIMES DAILY
Status: DISCONTINUED | OUTPATIENT
Start: 2024-04-07 | End: 2024-04-22

## 2024-04-06 RX ORDER — VITAMIN B COMPLEX
2000 TABLET ORAL DAILY
Status: DISCONTINUED | OUTPATIENT
Start: 2024-04-06 | End: 2024-05-07 | Stop reason: HOSPADM

## 2024-04-06 RX ORDER — POLYETHYLENE GLYCOL 3350 17 G/17G
17 POWDER, FOR SOLUTION ORAL DAILY PRN
Status: DISCONTINUED | OUTPATIENT
Start: 2024-04-06 | End: 2024-04-25

## 2024-04-06 RX ORDER — METOPROLOL TARTRATE 1 MG/ML
5 INJECTION, SOLUTION INTRAVENOUS
Status: DISCONTINUED | OUTPATIENT
Start: 2024-04-06 | End: 2024-04-23

## 2024-04-06 RX ORDER — IPRATROPIUM BROMIDE AND ALBUTEROL SULFATE 2.5; .5 MG/3ML; MG/3ML
1 SOLUTION RESPIRATORY (INHALATION)
Status: DISCONTINUED | OUTPATIENT
Start: 2024-04-06 | End: 2024-04-08

## 2024-04-06 RX ORDER — HEPARIN SODIUM 1000 [USP'U]/ML
80 INJECTION, SOLUTION INTRAVENOUS; SUBCUTANEOUS ONCE
Status: COMPLETED | OUTPATIENT
Start: 2024-04-07 | End: 2024-04-07

## 2024-04-06 RX ORDER — UMECLIDINIUM BROMIDE AND VILANTEROL TRIFENATATE 62.5; 25 UG/1; UG/1
POWDER RESPIRATORY (INHALATION)
COMMUNITY
Start: 2024-04-05

## 2024-04-06 RX ORDER — GABAPENTIN 300 MG/1
300 CAPSULE ORAL EVERY EVENING
Status: DISCONTINUED | OUTPATIENT
Start: 2024-04-06 | End: 2024-05-07 | Stop reason: HOSPADM

## 2024-04-06 RX ORDER — SODIUM CHLORIDE 0.9 % (FLUSH) 0.9 %
5-40 SYRINGE (ML) INJECTION EVERY 12 HOURS SCHEDULED
Status: DISCONTINUED | OUTPATIENT
Start: 2024-04-06 | End: 2024-05-07 | Stop reason: HOSPADM

## 2024-04-06 RX ORDER — SODIUM CHLORIDE 9 MG/ML
INJECTION, SOLUTION INTRAVENOUS PRN
Status: DISCONTINUED | OUTPATIENT
Start: 2024-04-06 | End: 2024-05-07 | Stop reason: HOSPADM

## 2024-04-06 RX ORDER — INSULIN LISPRO 100 [IU]/ML
0-8 INJECTION, SOLUTION INTRAVENOUS; SUBCUTANEOUS
Status: DISCONTINUED | OUTPATIENT
Start: 2024-04-06 | End: 2024-04-22

## 2024-04-06 RX ORDER — SODIUM CHLORIDE 0.9 % (FLUSH) 0.9 %
5-40 SYRINGE (ML) INJECTION PRN
Status: DISCONTINUED | OUTPATIENT
Start: 2024-04-06 | End: 2024-05-07 | Stop reason: HOSPADM

## 2024-04-06 RX ORDER — HEPARIN SODIUM 1000 [USP'U]/ML
40 INJECTION, SOLUTION INTRAVENOUS; SUBCUTANEOUS PRN
Status: DISCONTINUED | OUTPATIENT
Start: 2024-04-06 | End: 2024-04-08

## 2024-04-06 RX ORDER — MAGNESIUM SULFATE 1 G/100ML
1000 INJECTION INTRAVENOUS ONCE
Status: COMPLETED | OUTPATIENT
Start: 2024-04-06 | End: 2024-04-06

## 2024-04-06 RX ORDER — SEVELAMER CARBONATE 800 MG/1
800 TABLET, FILM COATED ORAL
Status: DISCONTINUED | OUTPATIENT
Start: 2024-04-06 | End: 2024-05-07 | Stop reason: HOSPADM

## 2024-04-06 RX ORDER — LANOLIN ALCOHOL/MO/W.PET/CERES
3 CREAM (GRAM) TOPICAL NIGHTLY PRN
Status: DISCONTINUED | OUTPATIENT
Start: 2024-04-06 | End: 2024-05-07 | Stop reason: HOSPADM

## 2024-04-06 RX ORDER — ATORVASTATIN CALCIUM 40 MG/1
80 TABLET, FILM COATED ORAL NIGHTLY
Status: DISCONTINUED | OUTPATIENT
Start: 2024-04-06 | End: 2024-05-07 | Stop reason: HOSPADM

## 2024-04-06 RX ORDER — INSULIN GLARGINE 100 [IU]/ML
15 INJECTION, SOLUTION SUBCUTANEOUS NIGHTLY
Status: DISCONTINUED | OUTPATIENT
Start: 2024-04-06 | End: 2024-05-01

## 2024-04-06 RX ORDER — ONDANSETRON 4 MG/1
4 TABLET, ORALLY DISINTEGRATING ORAL EVERY 8 HOURS PRN
Status: DISCONTINUED | OUTPATIENT
Start: 2024-04-06 | End: 2024-05-07 | Stop reason: HOSPADM

## 2024-04-06 RX ORDER — ALBUMIN (HUMAN) 12.5 G/50ML
25 SOLUTION INTRAVENOUS ONCE
Status: COMPLETED | OUTPATIENT
Start: 2024-04-07 | End: 2024-04-07

## 2024-04-06 RX ORDER — ALBUTEROL SULFATE 90 UG/1
AEROSOL, METERED RESPIRATORY (INHALATION)
COMMUNITY
Start: 2024-04-05

## 2024-04-06 RX ORDER — ALBUMIN (HUMAN) 12.5 G/50ML
25 SOLUTION INTRAVENOUS
Status: ACTIVE | OUTPATIENT
Start: 2024-04-06 | End: 2024-04-07

## 2024-04-06 RX ORDER — PANTOPRAZOLE SODIUM 40 MG/1
40 TABLET, DELAYED RELEASE ORAL DAILY
Status: DISCONTINUED | OUTPATIENT
Start: 2024-04-06 | End: 2024-04-21

## 2024-04-06 RX ORDER — ALLOPURINOL 100 MG/1
100 TABLET ORAL
Status: DISCONTINUED | OUTPATIENT
Start: 2024-04-08 | End: 2024-05-07 | Stop reason: HOSPADM

## 2024-04-06 RX ORDER — ASPIRIN 81 MG/1
81 TABLET, CHEWABLE ORAL DAILY
Status: DISCONTINUED | OUTPATIENT
Start: 2024-04-06 | End: 2024-05-07 | Stop reason: HOSPADM

## 2024-04-06 RX ORDER — INSULIN LISPRO 100 [IU]/ML
0-4 INJECTION, SOLUTION INTRAVENOUS; SUBCUTANEOUS NIGHTLY
Status: DISCONTINUED | OUTPATIENT
Start: 2024-04-06 | End: 2024-04-22

## 2024-04-06 RX ADMIN — WATER 125 MG: 1 INJECTION INTRAMUSCULAR; INTRAVENOUS; SUBCUTANEOUS at 09:39

## 2024-04-06 RX ADMIN — ATORVASTATIN CALCIUM 80 MG: 40 TABLET, FILM COATED ORAL at 20:28

## 2024-04-06 RX ADMIN — GABAPENTIN 300 MG: 300 CAPSULE ORAL at 17:16

## 2024-04-06 RX ADMIN — HEPARIN SODIUM 5000 UNITS: 5000 INJECTION INTRAVENOUS; SUBCUTANEOUS at 20:28

## 2024-04-06 RX ADMIN — INSULIN GLARGINE 15 UNITS: 100 INJECTION, SOLUTION SUBCUTANEOUS at 20:28

## 2024-04-06 RX ADMIN — WATER 40 MG: 1 INJECTION INTRAMUSCULAR; INTRAVENOUS; SUBCUTANEOUS at 22:10

## 2024-04-06 RX ADMIN — MAGNESIUM SULFATE HEPTAHYDRATE 2000 MG: 40 INJECTION, SOLUTION INTRAVENOUS at 22:12

## 2024-04-06 RX ADMIN — IPRATROPIUM BROMIDE 0.5 MG: 0.5 SOLUTION RESPIRATORY (INHALATION) at 15:15

## 2024-04-06 RX ADMIN — SODIUM CHLORIDE, PRESERVATIVE FREE 10 ML: 5 INJECTION INTRAVENOUS at 20:29

## 2024-04-06 RX ADMIN — SEVELAMER CARBONATE 800 MG: 800 TABLET, FILM COATED ORAL at 17:22

## 2024-04-06 RX ADMIN — MAGNESIUM SULFATE IN DEXTROSE 1000 MG: 10 INJECTION, SOLUTION INTRAVENOUS at 12:43

## 2024-04-06 RX ADMIN — ACETAMINOPHEN 325MG 650 MG: 325 TABLET ORAL at 17:16

## 2024-04-06 RX ADMIN — IPRATROPIUM BROMIDE AND ALBUTEROL SULFATE 1 DOSE: .5; 3 SOLUTION RESPIRATORY (INHALATION) at 15:15

## 2024-04-06 RX ADMIN — ALBUTEROL SULFATE 2.5 MG: 1.25 SOLUTION RESPIRATORY (INHALATION) at 09:08

## 2024-04-06 RX ADMIN — INSULIN LISPRO 4 UNITS: 100 INJECTION, SOLUTION INTRAVENOUS; SUBCUTANEOUS at 17:21

## 2024-04-06 RX ADMIN — ALBUMIN (HUMAN) 25 G: 0.25 INJECTION, SOLUTION INTRAVENOUS at 23:57

## 2024-04-06 RX ADMIN — IPRATROPIUM BROMIDE AND ALBUTEROL SULFATE 1 DOSE: .5; 3 SOLUTION RESPIRATORY (INHALATION) at 19:30

## 2024-04-06 RX ADMIN — TRAMADOL HYDROCHLORIDE 50 MG: 50 TABLET ORAL at 17:16

## 2024-04-06 ASSESSMENT — PAIN DESCRIPTION - DESCRIPTORS
DESCRIPTORS: ACHING;DISCOMFORT
DESCRIPTORS: ACHING;DISCOMFORT;GNAWING
DESCRIPTORS: DULL;GNAWING;HEAVINESS

## 2024-04-06 ASSESSMENT — PAIN DESCRIPTION - ORIENTATION
ORIENTATION: MID;LOWER
ORIENTATION: MID
ORIENTATION: LOWER

## 2024-04-06 ASSESSMENT — PAIN SCALES - GENERAL
PAINLEVEL_OUTOF10: 10
PAINLEVEL_OUTOF10: 10
PAINLEVEL_OUTOF10: 9
PAINLEVEL_OUTOF10: 10
PAINLEVEL_OUTOF10: 9

## 2024-04-06 ASSESSMENT — PAIN DESCRIPTION - LOCATION
LOCATION: BACK

## 2024-04-06 ASSESSMENT — PAIN DESCRIPTION - DIRECTION
RADIATING_TOWARDS: LOWER BACK
RADIATING_TOWARDS: LOWER BACK

## 2024-04-06 ASSESSMENT — PAIN DESCRIPTION - FREQUENCY
FREQUENCY: CONTINUOUS

## 2024-04-06 ASSESSMENT — PAIN - FUNCTIONAL ASSESSMENT
PAIN_FUNCTIONAL_ASSESSMENT: NONE - DENIES PAIN
PAIN_FUNCTIONAL_ASSESSMENT: PREVENTS OR INTERFERES SOME ACTIVE ACTIVITIES AND ADLS

## 2024-04-06 ASSESSMENT — PAIN DESCRIPTION - PAIN TYPE
TYPE: ACUTE PAIN
TYPE: ACUTE PAIN
TYPE: CHRONIC PAIN

## 2024-04-06 ASSESSMENT — PAIN DESCRIPTION - ONSET
ONSET: ON-GOING

## 2024-04-06 NOTE — ED NOTES
Attempt to initiate IV access x2. Scar tissue noted at sites. MD made evaluating patient at this time.

## 2024-04-06 NOTE — ED NOTES
RT contacted and notified of Dr. Elder wanting to assess if patient is able to come off of the biPap. RT to come to assess patient.

## 2024-04-06 NOTE — ED PROVIDER NOTES
EMERGENCY DEPARTMENT HISTORY AND PHYSICAL EXAM    9:02 AM EDT and on arrival in room 2        Date: 4/6/2024  Patient Name: Christy Donahue    History of Presenting Illness     Chief Complaint   Patient presents with    Respiratory Distress         History Provided By: Paramedics    Additional History (Context): Christy Donahue is a 73 y.o. female presents with ESRD dialysis last dialysis was yesterday COPD, called EMS for difficulty breathing paramedics report wheezing in all fields given albuterol unsuccessful with IV attempt and CPAP applied.  Patient arrives on CPAP and tachypneic.  She is alert and cooperative with a mask.    PCP: Kalli Louise APRN - NP    Chief Complaint:   Duration:    Timing:    Location:   Quality:   Severity:   Modifying Factors:   Associated Symptoms:       No current facility-administered medications for this encounter.     Current Outpatient Medications   Medication Sig Dispense Refill    albuterol sulfate HFA (PROVENTIL;VENTOLIN;PROAIR) 108 (90 Base) MCG/ACT inhaler       Cholecalciferol (D3 DOTS) 50 MCG (2000 UT) TBDP Take 1 tablet by mouth daily      ANORO ELLIPTA 62.5-25 MCG/ACT inhaler       lidocaine (LIDODERM) 5 % Place 1 patch onto the skin daily for 10 days 12 hours on, 12 hours off. 10 patch 0    gabapentin (NEURONTIN) 300 MG capsule Take 1 capsule by mouth every evening for 30 days. Max Daily Amount: 300 mg 30 capsule 0    polyethylene glycol (GLYCOLAX) 17 g packet Take 1 packet by mouth 2 times daily Hold for loose stool. 1020 g 0    pantoprazole (PROTONIX) 40 MG tablet Take 1 tablet by mouth daily 30 tablet 0    senna-docusate (PERICOLACE) 8.6-50 MG per tablet Take 1 tablet by mouth 2 times daily      LINZESS 72 MCG CAPS capsule TAKE 1 CAPSULE EACH DAY ON AN EMPTY STOMACH AT LEAST 30 MINUTES PRIOR TO A MEAL AT APPROXIMATELY THE SAME TIME EACH DAY.      ipratropium 0.5 mg-albuterol 2.5 mg (DUONEB) 0.5-2.5 (3) MG/3ML SOLN nebulizer solution INHALE 1 VIAL VIA NEBULIZER

## 2024-04-06 NOTE — ED NOTES
Blood cultures one and two drawn due to elevated respirations and HR. Md notified and this nurse clarified if sepsis protocol is to be completed. Per MD response is no not at this time.

## 2024-04-06 NOTE — H&P
agree.  All questions answered.      Disclaimer: Sections of this note are dictated using utilizing voice recognition software. Minor typographical errors may be present. If questions arise, please do not hesitate to contact me or call our department.        Shar Interiano MD MD  [facility]

## 2024-04-06 NOTE — ED TRIAGE NOTES
Difficulty breathing since last night. Seen yesterday for fall. On dialysis received full treatment yesterday. Hx CHF, copd, ESRD, HTN diabetes. SpO2 was 86 upon ems arrival and was brought up to 96 with cpap.

## 2024-04-06 NOTE — ED NOTES
TRANSFER - OUT REPORT:    Verbal report given to LISETTE Avitia on Christy Donahue  being transferred to  for routine progression of patient care       Report consisted of patient's Situation, Background, Assessment and   Recommendations(SBAR).     Information from the following report(s) Nurse Handoff Report, ED Encounter Summary, and ED SBAR was reviewed with the receiving nurse.    Kinder Fall Assessment:                           Lines:   Peripheral IV 04/06/24 Right;Ventral Cephalic (Active)   Site Assessment Clean, dry & intact 04/06/24 0939   Line Status Brisk blood return;Flushed;Normal saline locked 04/06/24 0939   Line Care Line pulled back 04/06/24 0939   Phlebitis Assessment No symptoms 04/06/24 0939   Infiltration Assessment 0 04/06/24 0939   Alcohol Cap Used No 04/06/24 0939   Dressing Status New dressing applied;Clean, dry & intact 04/06/24 0939   Dressing Type Transparent 04/06/24 0939   Dressing Intervention New 04/06/24 0939        Opportunity for questions and clarification was provided.      Patient transported with:  Registered Nurse

## 2024-04-07 ENCOUNTER — APPOINTMENT (OUTPATIENT)
Facility: HOSPITAL | Age: 74
DRG: 208 | End: 2024-04-07
Payer: MEDICARE

## 2024-04-07 LAB
ALBUMIN SERPL-MCNC: 3.1 G/DL (ref 3.4–5)
ALBUMIN/GLOB SERPL: 0.9 (ref 0.8–1.7)
ALP SERPL-CCNC: 135 U/L (ref 45–117)
ALT SERPL-CCNC: 16 U/L (ref 13–56)
ANION GAP SERPL CALC-SCNC: 11 MMOL/L (ref 3–18)
APTT PPP: 30.5 SEC (ref 23–36.4)
APTT PPP: 72.9 SEC (ref 23–36.4)
APTT PPP: >180 SEC (ref 23–36.4)
AST SERPL-CCNC: 15 U/L (ref 10–38)
BASOPHILS # BLD: 0 K/UL (ref 0–0.1)
BASOPHILS NFR BLD: 0 % (ref 0–2)
BILIRUB SERPL-MCNC: 0.6 MG/DL (ref 0.2–1)
BUN SERPL-MCNC: 22 MG/DL (ref 7–18)
BUN/CREAT SERPL: 6 (ref 12–20)
CALCIUM SERPL-MCNC: 9.4 MG/DL (ref 8.5–10.1)
CHLORIDE SERPL-SCNC: 99 MMOL/L (ref 100–111)
CO2 SERPL-SCNC: 26 MMOL/L (ref 21–32)
CREAT SERPL-MCNC: 3.46 MG/DL (ref 0.6–1.3)
DIFFERENTIAL METHOD BLD: ABNORMAL
EKG ATRIAL RATE: 119 BPM
EKG ATRIAL RATE: 90 BPM
EKG ATRIAL RATE: 91 BPM
EKG DIAGNOSIS: NORMAL
EKG P AXIS: 80 DEGREES
EKG P AXIS: 82 DEGREES
EKG P AXIS: 82 DEGREES
EKG P-R INTERVAL: 146 MS
EKG P-R INTERVAL: 164 MS
EKG P-R INTERVAL: 176 MS
EKG Q-T INTERVAL: 346 MS
EKG Q-T INTERVAL: 408 MS
EKG Q-T INTERVAL: 414 MS
EKG QRS DURATION: 78 MS
EKG QRS DURATION: 88 MS
EKG QRS DURATION: 90 MS
EKG QTC CALCULATION (BAZETT): 486 MS
EKG QTC CALCULATION (BAZETT): 499 MS
EKG QTC CALCULATION (BAZETT): 509 MS
EKG R AXIS: 50 DEGREES
EKG R AXIS: 52 DEGREES
EKG R AXIS: 69 DEGREES
EKG T AXIS: 92 DEGREES
EKG T AXIS: 94 DEGREES
EKG T AXIS: 94 DEGREES
EKG VENTRICULAR RATE: 119 BPM
EKG VENTRICULAR RATE: 90 BPM
EKG VENTRICULAR RATE: 91 BPM
EOSINOPHIL # BLD: 0 K/UL (ref 0–0.4)
EOSINOPHIL NFR BLD: 0 % (ref 0–5)
ERYTHROCYTE [DISTWIDTH] IN BLOOD BY AUTOMATED COUNT: 16.3 % (ref 11.6–14.5)
GLOBULIN SER CALC-MCNC: 3.4 G/DL (ref 2–4)
GLUCOSE BLD STRIP.AUTO-MCNC: 180 MG/DL (ref 70–110)
GLUCOSE BLD STRIP.AUTO-MCNC: 263 MG/DL (ref 70–110)
GLUCOSE BLD STRIP.AUTO-MCNC: 274 MG/DL (ref 70–110)
GLUCOSE BLD STRIP.AUTO-MCNC: 313 MG/DL (ref 70–110)
GLUCOSE SERPL-MCNC: 213 MG/DL (ref 74–99)
HCT VFR BLD AUTO: 27.1 % (ref 35–45)
HGB BLD-MCNC: 8.6 G/DL (ref 12–16)
IMM GRANULOCYTES # BLD AUTO: 0.1 K/UL (ref 0–0.04)
IMM GRANULOCYTES NFR BLD AUTO: 1 % (ref 0–0.5)
LACTATE SERPL-SCNC: 3.5 MMOL/L (ref 0.4–2)
LACTATE SERPL-SCNC: 5.8 MMOL/L (ref 0.4–2)
LYMPHOCYTES # BLD: 0.7 K/UL (ref 0.9–3.6)
LYMPHOCYTES NFR BLD: 9 % (ref 21–52)
MCH RBC QN AUTO: 30.5 PG (ref 24–34)
MCHC RBC AUTO-ENTMCNC: 31.7 G/DL (ref 31–37)
MCV RBC AUTO: 96.1 FL (ref 78–100)
MONOCYTES # BLD: 0.4 K/UL (ref 0.05–1.2)
MONOCYTES NFR BLD: 5 % (ref 3–10)
NEUTS SEG # BLD: 6.5 K/UL (ref 1.8–8)
NEUTS SEG NFR BLD: 85 % (ref 40–73)
NRBC # BLD: 0.07 K/UL (ref 0–0.01)
NRBC BLD-RTO: 0.9 PER 100 WBC
NT PRO BNP: 7519 PG/ML (ref 0–900)
PLATELET # BLD AUTO: 257 K/UL (ref 135–420)
PMV BLD AUTO: 9.6 FL (ref 9.2–11.8)
POTASSIUM SERPL-SCNC: 4.1 MMOL/L (ref 3.5–5.5)
PROT SERPL-MCNC: 6.5 G/DL (ref 6.4–8.2)
RBC # BLD AUTO: 2.82 M/UL (ref 4.2–5.3)
SODIUM SERPL-SCNC: 136 MMOL/L (ref 136–145)
TROPONIN I SERPL HS-MCNC: 68 NG/L (ref 0–54)
TROPONIN I SERPL HS-MCNC: 73 NG/L (ref 0–54)
TROPONIN I SERPL HS-MCNC: 74 NG/L (ref 0–54)
WBC # BLD AUTO: 7.7 K/UL (ref 4.6–13.2)

## 2024-04-07 PROCEDURE — 6360000004 HC RX CONTRAST MEDICATION: Performed by: INTERNAL MEDICINE

## 2024-04-07 PROCEDURE — 93010 ELECTROCARDIOGRAM REPORT: CPT | Performed by: INTERNAL MEDICINE

## 2024-04-07 PROCEDURE — 83605 ASSAY OF LACTIC ACID: CPT

## 2024-04-07 PROCEDURE — 6360000002 HC RX W HCPCS: Performed by: INTERNAL MEDICINE

## 2024-04-07 PROCEDURE — 6360000002 HC RX W HCPCS: Performed by: STUDENT IN AN ORGANIZED HEALTH CARE EDUCATION/TRAINING PROGRAM

## 2024-04-07 PROCEDURE — 2700000000 HC OXYGEN THERAPY PER DAY

## 2024-04-07 PROCEDURE — 6370000000 HC RX 637 (ALT 250 FOR IP): Performed by: STUDENT IN AN ORGANIZED HEALTH CARE EDUCATION/TRAINING PROGRAM

## 2024-04-07 PROCEDURE — 99232 SBSQ HOSP IP/OBS MODERATE 35: CPT | Performed by: INTERNAL MEDICINE

## 2024-04-07 PROCEDURE — 93005 ELECTROCARDIOGRAM TRACING: CPT | Performed by: INTERNAL MEDICINE

## 2024-04-07 PROCEDURE — 80053 COMPREHEN METABOLIC PANEL: CPT

## 2024-04-07 PROCEDURE — 36415 COLL VENOUS BLD VENIPUNCTURE: CPT

## 2024-04-07 PROCEDURE — 94660 CPAP INITIATION&MGMT: CPT

## 2024-04-07 PROCEDURE — 6370000000 HC RX 637 (ALT 250 FOR IP): Performed by: INTERNAL MEDICINE

## 2024-04-07 PROCEDURE — 85730 THROMBOPLASTIN TIME PARTIAL: CPT

## 2024-04-07 PROCEDURE — 94761 N-INVAS EAR/PLS OXIMETRY MLT: CPT

## 2024-04-07 PROCEDURE — 71275 CT ANGIOGRAPHY CHEST: CPT

## 2024-04-07 PROCEDURE — 2580000003 HC RX 258: Performed by: STUDENT IN AN ORGANIZED HEALTH CARE EDUCATION/TRAINING PROGRAM

## 2024-04-07 PROCEDURE — 1100000003 HC PRIVATE W/ TELEMETRY

## 2024-04-07 PROCEDURE — 85025 COMPLETE CBC W/AUTO DIFF WBC: CPT

## 2024-04-07 PROCEDURE — 84484 ASSAY OF TROPONIN QUANT: CPT

## 2024-04-07 PROCEDURE — 82962 GLUCOSE BLOOD TEST: CPT

## 2024-04-07 PROCEDURE — 94640 AIRWAY INHALATION TREATMENT: CPT

## 2024-04-07 RX ORDER — NALOXONE HYDROCHLORIDE 0.4 MG/ML
0.4 INJECTION, SOLUTION INTRAMUSCULAR; INTRAVENOUS; SUBCUTANEOUS PRN
Status: DISCONTINUED | OUTPATIENT
Start: 2024-04-07 | End: 2024-05-07 | Stop reason: HOSPADM

## 2024-04-07 RX ORDER — BUDESONIDE 1 MG/2ML
1 INHALANT ORAL
Status: DISCONTINUED | OUTPATIENT
Start: 2024-04-07 | End: 2024-05-07 | Stop reason: HOSPADM

## 2024-04-07 RX ORDER — GUAIFENESIN 600 MG/1
600 TABLET, EXTENDED RELEASE ORAL 2 TIMES DAILY
Status: DISCONTINUED | OUTPATIENT
Start: 2024-04-07 | End: 2024-05-07 | Stop reason: HOSPADM

## 2024-04-07 RX ORDER — BENZONATATE 100 MG/1
100 CAPSULE ORAL 3 TIMES DAILY PRN
Status: DISCONTINUED | OUTPATIENT
Start: 2024-04-07 | End: 2024-05-07 | Stop reason: HOSPADM

## 2024-04-07 RX ORDER — MORPHINE SULFATE 2 MG/ML
2 INJECTION, SOLUTION INTRAMUSCULAR; INTRAVENOUS ONCE
Status: COMPLETED | OUTPATIENT
Start: 2024-04-07 | End: 2024-04-07

## 2024-04-07 RX ORDER — OXYCODONE HYDROCHLORIDE AND ACETAMINOPHEN 5; 325 MG/1; MG/1
1 TABLET ORAL EVERY 4 HOURS PRN
Status: DISCONTINUED | OUTPATIENT
Start: 2024-04-07 | End: 2024-05-07 | Stop reason: HOSPADM

## 2024-04-07 RX ORDER — BUDESONIDE 1 MG/2ML
1 INHALANT ORAL 2 TIMES DAILY
Status: DISCONTINUED | OUTPATIENT
Start: 2024-04-07 | End: 2024-04-07

## 2024-04-07 RX ORDER — ARFORMOTEROL TARTRATE 15 UG/2ML
15 SOLUTION RESPIRATORY (INHALATION)
Status: DISCONTINUED | OUTPATIENT
Start: 2024-04-07 | End: 2024-05-07 | Stop reason: HOSPADM

## 2024-04-07 RX ORDER — DOXYCYCLINE HYCLATE 100 MG/1
100 CAPSULE ORAL EVERY 12 HOURS SCHEDULED
Status: DISPENSED | OUTPATIENT
Start: 2024-04-07 | End: 2024-04-10

## 2024-04-07 RX ADMIN — BENZONATATE 100 MG: 100 CAPSULE ORAL at 16:06

## 2024-04-07 RX ADMIN — IOPAMIDOL 85 ML: 755 INJECTION, SOLUTION INTRAVENOUS at 15:23

## 2024-04-07 RX ADMIN — HEPARIN SODIUM 6610 UNITS: 1000 INJECTION INTRAVENOUS; SUBCUTANEOUS at 00:16

## 2024-04-07 RX ADMIN — WATER 40 MG: 1 INJECTION INTRAMUSCULAR; INTRAVENOUS; SUBCUTANEOUS at 16:06

## 2024-04-07 RX ADMIN — INSULIN LISPRO 6 UNITS: 100 INJECTION, SOLUTION INTRAVENOUS; SUBCUTANEOUS at 13:01

## 2024-04-07 RX ADMIN — BUDESONIDE 1 MG: 1 SUSPENSION RESPIRATORY (INHALATION) at 19:38

## 2024-04-07 RX ADMIN — CHOLECALCIFEROL TAB 25 MCG (1000 UNIT) 2000 UNITS: 25 TAB at 09:06

## 2024-04-07 RX ADMIN — MIDODRINE HYDROCHLORIDE 10 MG: 10 TABLET ORAL at 04:29

## 2024-04-07 RX ADMIN — GUAIFENESIN 600 MG: 600 TABLET, EXTENDED RELEASE ORAL at 20:34

## 2024-04-07 RX ADMIN — IPRATROPIUM BROMIDE AND ALBUTEROL SULFATE 1 DOSE: .5; 3 SOLUTION RESPIRATORY (INHALATION) at 19:38

## 2024-04-07 RX ADMIN — GABAPENTIN 300 MG: 300 CAPSULE ORAL at 17:59

## 2024-04-07 RX ADMIN — INSULIN GLARGINE 15 UNITS: 100 INJECTION, SOLUTION SUBCUTANEOUS at 20:33

## 2024-04-07 RX ADMIN — EZETIMIBE 10 MG: 10 TABLET ORAL at 09:06

## 2024-04-07 RX ADMIN — FOLIC ACID 1 MG: 1 TABLET ORAL at 09:06

## 2024-04-07 RX ADMIN — ASPIRIN 81 MG CHEWABLE TABLET 81 MG: 81 TABLET CHEWABLE at 09:06

## 2024-04-07 RX ADMIN — SEVELAMER CARBONATE 800 MG: 800 TABLET, FILM COATED ORAL at 09:06

## 2024-04-07 RX ADMIN — MORPHINE SULFATE 2 MG: 2 INJECTION, SOLUTION INTRAMUSCULAR; INTRAVENOUS at 16:06

## 2024-04-07 RX ADMIN — SODIUM CHLORIDE, PRESERVATIVE FREE 10 ML: 5 INJECTION INTRAVENOUS at 20:34

## 2024-04-07 RX ADMIN — IPRATROPIUM BROMIDE AND ALBUTEROL SULFATE 1 DOSE: .5; 3 SOLUTION RESPIRATORY (INHALATION) at 08:14

## 2024-04-07 RX ADMIN — HEPARIN SODIUM AND DEXTROSE 15 UNITS/KG/HR: 10000; 5 INJECTION INTRAVENOUS at 19:20

## 2024-04-07 RX ADMIN — ARFORMOTEROL TARTRATE 15 MCG: 15 SOLUTION RESPIRATORY (INHALATION) at 19:38

## 2024-04-07 RX ADMIN — ATORVASTATIN CALCIUM 80 MG: 40 TABLET, FILM COATED ORAL at 20:33

## 2024-04-07 RX ADMIN — SODIUM CHLORIDE, PRESERVATIVE FREE 10 ML: 5 INJECTION INTRAVENOUS at 09:11

## 2024-04-07 RX ADMIN — GUAIFENESIN 600 MG: 600 TABLET, EXTENDED RELEASE ORAL at 16:06

## 2024-04-07 RX ADMIN — WATER 40 MG: 1 INJECTION INTRAMUSCULAR; INTRAVENOUS; SUBCUTANEOUS at 09:07

## 2024-04-07 RX ADMIN — SEVELAMER CARBONATE 800 MG: 800 TABLET, FILM COATED ORAL at 12:20

## 2024-04-07 RX ADMIN — DOXYCYCLINE HYCLATE 100 MG: 100 CAPSULE ORAL at 20:34

## 2024-04-07 RX ADMIN — ACETAMINOPHEN 325MG 650 MG: 325 TABLET ORAL at 20:33

## 2024-04-07 RX ADMIN — ACETAMINOPHEN 325MG 650 MG: 325 TABLET ORAL at 03:07

## 2024-04-07 RX ADMIN — HEPARIN SODIUM AND DEXTROSE 18 UNITS/KG/HR: 10000; 5 INJECTION INTRAVENOUS at 00:17

## 2024-04-07 RX ADMIN — MIDODRINE HYDROCHLORIDE 10 MG: 10 TABLET ORAL at 20:33

## 2024-04-07 RX ADMIN — INSULIN LISPRO 4 UNITS: 100 INJECTION, SOLUTION INTRAVENOUS; SUBCUTANEOUS at 17:59

## 2024-04-07 RX ADMIN — WATER 40 MG: 1 INJECTION INTRAMUSCULAR; INTRAVENOUS; SUBCUTANEOUS at 22:19

## 2024-04-07 RX ADMIN — SEVELAMER CARBONATE 800 MG: 800 TABLET, FILM COATED ORAL at 17:59

## 2024-04-07 RX ADMIN — IPRATROPIUM BROMIDE AND ALBUTEROL SULFATE 1 DOSE: .5; 3 SOLUTION RESPIRATORY (INHALATION) at 11:39

## 2024-04-07 RX ADMIN — OXYCODONE AND ACETAMINOPHEN 1 TABLET: 5; 325 TABLET ORAL at 06:53

## 2024-04-07 RX ADMIN — PANTOPRAZOLE SODIUM 40 MG: 40 TABLET, DELAYED RELEASE ORAL at 09:06

## 2024-04-07 RX ADMIN — CYANOCOBALAMIN TAB 1000 MCG 1000 MCG: 1000 TAB at 09:06

## 2024-04-07 ASSESSMENT — PAIN SCALES - GENERAL
PAINLEVEL_OUTOF10: 0
PAINLEVEL_OUTOF10: 10
PAINLEVEL_OUTOF10: 6
PAINLEVEL_OUTOF10: 10
PAINLEVEL_OUTOF10: 9
PAINLEVEL_OUTOF10: 10

## 2024-04-07 ASSESSMENT — PAIN DESCRIPTION - PAIN TYPE: TYPE: ACUTE PAIN

## 2024-04-07 ASSESSMENT — PAIN DESCRIPTION - DESCRIPTORS
DESCRIPTORS: SHARP;SHOOTING
DESCRIPTORS: ACHING
DESCRIPTORS: DISCOMFORT;ACHING
DESCRIPTORS: ACHING
DESCRIPTORS: STABBING

## 2024-04-07 ASSESSMENT — PAIN DESCRIPTION - LOCATION
LOCATION: BACK;HEAD
LOCATION: BACK
LOCATION: HEAD
LOCATION: CHEST
LOCATION: BACK
LOCATION: BACK

## 2024-04-07 ASSESSMENT — PAIN - FUNCTIONAL ASSESSMENT
PAIN_FUNCTIONAL_ASSESSMENT: PREVENTS OR INTERFERES SOME ACTIVE ACTIVITIES AND ADLS
PAIN_FUNCTIONAL_ASSESSMENT: PREVENTS OR INTERFERES SOME ACTIVE ACTIVITIES AND ADLS
PAIN_FUNCTIONAL_ASSESSMENT: ACTIVITIES ARE NOT PREVENTED

## 2024-04-07 ASSESSMENT — PAIN DESCRIPTION - ORIENTATION
ORIENTATION: MID
ORIENTATION: MID;LOWER
ORIENTATION: LEFT
ORIENTATION: POSTERIOR
ORIENTATION: ANTERIOR

## 2024-04-07 NOTE — FLOWSHEET NOTE
04/07/24 1100   Treatment Team Notification   Reason for Communication Critical results   Type of Critical Result Laboratory   Critical Lab Information APTT>180   Person Result Received From Ankit Chapman   Critical Lab Result Type Coagulation   Name of Team Member Notified LISETTE Jansen   Treatment Team Role Nurse/Charge Nurse   Method of Communication Face to face   Response Other (Comment)  (Hold Heparin 1 hour)   Notification Time 1100     Primary RNJustyna, notified.

## 2024-04-07 NOTE — SIGNIFICANT EVENT
INTERIM UPDATE - 0738 EST on 4/07/2024    Nursing Staff reports that Patient is complaint of chest pain.  Nursing Staff is working on obtaining EKG at this time.    Plan:  Ordered Troponin STAT and another at 1200 EST.

## 2024-04-07 NOTE — FLOWSHEET NOTE
04/07/24 1405   Treatment Team Notification   Reason for Communication Critical results   Type of Critical Result Laboratory   Critical Lab Information Lactic Acid 3.5   Person Result Received From Ankit Velázquez   Critical Lab Result Type Lactic acid   Name of Team Member Notified Dr. Olivares   Treatment Team Role Attending Provider   Method of Communication Page   Response Waiting for response   Notification Time 1405     Primary RN, Justyna, notified.

## 2024-04-07 NOTE — SIGNIFICANT EVENT
INTERIM UPDATE - 2138 EST on 4/06/2024    Review of EKG does not show features concerning for ischemia, but does show QTc Prolongation (486 ms).  Patient is noted to have K+ 4.2 this AM (4/06/2024) and Magnesium 1.7 mg/dL this AM (4/06/2024).  Notably, Patient has End-Stage Renal Disease (a.k.a. ESRD) on Hemodialysis (a.k.a. HD).    Notably, Patient has been consistently tachycardic and tachypneic with some relative hypotension (MAP 68, 66) without administration of antihypertensives and present just prior to administration of Tramadol) and elevated Lactic Acid in the setting of complaints of Shortness of Breath.  Patient reportedly has a history of Deep Vein Thrombosis (a.k.a. DVT) with no IVC/Chinyere Filter per EHR and no Long-Term Anticoagulation per Medication Review.  Minimally elevated Troponin (56 ng/L to 73 ng/L) in [facility] ER.  Notably, Patient has already received IV Contrast today with CT Abdomen/Pelvis.    Plan:  Checked a Troponin with EKG earlier.  Will order IV Magnesium sulfate 2 grams.    Ordered PRN IV Albumin 25% 25 grams for MAP <65 x1 dose and instructions to call Physician On-Call if this agent is administered for the listed indication.    Some concern for possible Pulmonary Embolism.  Recheck Troponin and Lactic Acid in AM.  Ordered D-Dimer.  Consider CT Chest w/ w/o Contrast or Echocardiogram in AM to evaluate for possible Right Heart Strain.        INTERIM UPDATE - 4866 EST on 4/06/2024    Nursing Staff calls back to report that Patient's Lactic Acid increased to 7.1 mmol/L.  Troponin is 71 ng/L with D-Dimer 2.40 ug/mL.  Nursing Staff reports that Patient is complaining of Back Pain.    Notably, Patient already received IV Contrast today.    Blood Pressure has been marginally low, but not hypotensive this visit.  Not sufficient to explain elevated Lactic Acid due to global hypotension.  Concern for possible regional hypotension (possibly due to blood clot?).    Plan:  Due to concern for

## 2024-04-08 ENCOUNTER — APPOINTMENT (OUTPATIENT)
Facility: HOSPITAL | Age: 74
DRG: 208 | End: 2024-04-08
Attending: INTERNAL MEDICINE
Payer: MEDICARE

## 2024-04-08 LAB
ALBUMIN SERPL-MCNC: 3 G/DL (ref 3.4–5)
ALBUMIN/GLOB SERPL: 1 (ref 0.8–1.7)
ALP SERPL-CCNC: 116 U/L (ref 45–117)
ALT SERPL-CCNC: 14 U/L (ref 13–56)
ANION GAP SERPL CALC-SCNC: 10 MMOL/L (ref 3–18)
APTT PPP: 131.9 SEC (ref 23–36.4)
APTT PPP: 72.9 SEC (ref 23–36.4)
AST SERPL-CCNC: 16 U/L (ref 10–38)
BASOPHILS # BLD: 0 K/UL (ref 0–0.1)
BASOPHILS NFR BLD: 0 % (ref 0–2)
BILIRUB SERPL-MCNC: 0.4 MG/DL (ref 0.2–1)
BUN SERPL-MCNC: 54 MG/DL (ref 7–18)
BUN/CREAT SERPL: 10 (ref 12–20)
CALCIUM SERPL-MCNC: 8.6 MG/DL (ref 8.5–10.1)
CHLORIDE SERPL-SCNC: 94 MMOL/L (ref 100–111)
CO2 SERPL-SCNC: 26 MMOL/L (ref 21–32)
CREAT SERPL-MCNC: 5.2 MG/DL (ref 0.6–1.3)
DIFFERENTIAL METHOD BLD: ABNORMAL
ECHO AO ASC DIAM: 3 CM
ECHO AO ASCENDING AORTA INDEX: 1.64 CM/M2
ECHO AO ROOT DIAM: 2.5 CM
ECHO AO ROOT INDEX: 1.37 CM/M2
ECHO BSA: 1.87 M2
ECHO LV EDV A2C: 181 ML
ECHO LV EDV A4C: 150 ML
ECHO LV EDV BP: 175 ML (ref 56–104)
ECHO LV EDV INDEX A4C: 82 ML/M2
ECHO LV EDV INDEX BP: 96 ML/M2
ECHO LV EDV NDEX A2C: 99 ML/M2
ECHO LV EJECTION FRACTION A2C: 39 %
ECHO LV EJECTION FRACTION A4C: 42 %
ECHO LV EJECTION FRACTION BIPLANE: 40 % (ref 55–100)
ECHO LV ESV A2C: 110 ML
ECHO LV ESV A4C: 87 ML
ECHO LV ESV BP: 106 ML (ref 19–49)
ECHO LV ESV INDEX A2C: 60 ML/M2
ECHO LV ESV INDEX A4C: 48 ML/M2
ECHO LV ESV INDEX BP: 58 ML/M2
ECHO LV FRACTIONAL SHORTENING: 17 % (ref 28–44)
ECHO LV INTERNAL DIMENSION DIASTOLE INDEX: 3.22 CM/M2
ECHO LV INTERNAL DIMENSION DIASTOLIC: 5.9 CM (ref 3.9–5.3)
ECHO LV INTERNAL DIMENSION SYSTOLIC INDEX: 2.68 CM/M2
ECHO LV INTERNAL DIMENSION SYSTOLIC: 4.9 CM
ECHO LV IVSD: 0.8 CM (ref 0.6–0.9)
ECHO LV MASS 2D: 195 G (ref 67–162)
ECHO LV MASS INDEX 2D: 106.5 G/M2 (ref 43–95)
ECHO LV POSTERIOR WALL DIASTOLIC: 0.9 CM (ref 0.6–0.9)
ECHO LV RELATIVE WALL THICKNESS RATIO: 0.31
ECHO LVOT AREA: 3.1 CM2
ECHO LVOT DIAM: 2 CM
ECHO MV REGURGITANT ALIASING (NYQUIST) VELOCITY: 36 CM/S
ECHO MV REGURGITANT PEAK GRADIENT: 130 MMHG
ECHO MV REGURGITANT PEAK VELOCITY: 5.7 M/S
ECHO MV REGURGITANT RADIUS PISA: 0.68 CM
ECHO MV REGURGITANT VTIA: 190.1 CM
ECHO PV MAX VELOCITY: 1.1 M/S
ECHO PV PEAK GRADIENT: 5 MMHG
EOSINOPHIL # BLD: 0 K/UL (ref 0–0.4)
EOSINOPHIL NFR BLD: 0 % (ref 0–5)
ERYTHROCYTE [DISTWIDTH] IN BLOOD BY AUTOMATED COUNT: 16.3 % (ref 11.6–14.5)
GLOBULIN SER CALC-MCNC: 3 G/DL (ref 2–4)
GLUCOSE BLD STRIP.AUTO-MCNC: 156 MG/DL (ref 70–110)
GLUCOSE BLD STRIP.AUTO-MCNC: 178 MG/DL (ref 70–110)
GLUCOSE BLD STRIP.AUTO-MCNC: 208 MG/DL (ref 70–110)
GLUCOSE BLD STRIP.AUTO-MCNC: 316 MG/DL (ref 70–110)
GLUCOSE SERPL-MCNC: 203 MG/DL (ref 74–99)
HCT VFR BLD AUTO: 24.3 % (ref 35–45)
HGB BLD-MCNC: 7.4 G/DL (ref 12–16)
IMM GRANULOCYTES # BLD AUTO: 0.2 K/UL (ref 0–0.04)
IMM GRANULOCYTES NFR BLD AUTO: 1 % (ref 0–0.5)
LYMPHOCYTES # BLD: 1.1 K/UL (ref 0.9–3.6)
LYMPHOCYTES NFR BLD: 9 % (ref 21–52)
MCH RBC QN AUTO: 29.1 PG (ref 24–34)
MCHC RBC AUTO-ENTMCNC: 30.5 G/DL (ref 31–37)
MCV RBC AUTO: 95.7 FL (ref 78–100)
MONOCYTES # BLD: 0.6 K/UL (ref 0.05–1.2)
MONOCYTES NFR BLD: 5 % (ref 3–10)
NEUTS SEG # BLD: 11 K/UL (ref 1.8–8)
NEUTS SEG NFR BLD: 85 % (ref 40–73)
NRBC # BLD: 0.11 K/UL (ref 0–0.01)
NRBC BLD-RTO: 0.9 PER 100 WBC
PLATELET # BLD AUTO: 306 K/UL (ref 135–420)
PMV BLD AUTO: 10 FL (ref 9.2–11.8)
POTASSIUM SERPL-SCNC: 4.6 MMOL/L (ref 3.5–5.5)
PROT SERPL-MCNC: 6 G/DL (ref 6.4–8.2)
RBC # BLD AUTO: 2.54 M/UL (ref 4.2–5.3)
SODIUM SERPL-SCNC: 130 MMOL/L (ref 136–145)
WBC # BLD AUTO: 12.9 K/UL (ref 4.6–13.2)

## 2024-04-08 PROCEDURE — 85730 THROMBOPLASTIN TIME PARTIAL: CPT

## 2024-04-08 PROCEDURE — 82962 GLUCOSE BLOOD TEST: CPT

## 2024-04-08 PROCEDURE — 6370000000 HC RX 637 (ALT 250 FOR IP): Performed by: INTERNAL MEDICINE

## 2024-04-08 PROCEDURE — 93321 DOPPLER ECHO F-UP/LMTD STD: CPT | Performed by: INTERNAL MEDICINE

## 2024-04-08 PROCEDURE — 6360000002 HC RX W HCPCS: Performed by: STUDENT IN AN ORGANIZED HEALTH CARE EDUCATION/TRAINING PROGRAM

## 2024-04-08 PROCEDURE — 6360000002 HC RX W HCPCS: Performed by: INTERNAL MEDICINE

## 2024-04-08 PROCEDURE — 93308 TTE F-UP OR LMTD: CPT | Performed by: INTERNAL MEDICINE

## 2024-04-08 PROCEDURE — 94761 N-INVAS EAR/PLS OXIMETRY MLT: CPT

## 2024-04-08 PROCEDURE — 93325 DOPPLER ECHO COLOR FLOW MAPG: CPT | Performed by: INTERNAL MEDICINE

## 2024-04-08 PROCEDURE — 94640 AIRWAY INHALATION TREATMENT: CPT

## 2024-04-08 PROCEDURE — 36415 COLL VENOUS BLD VENIPUNCTURE: CPT

## 2024-04-08 PROCEDURE — 6370000000 HC RX 637 (ALT 250 FOR IP): Performed by: STUDENT IN AN ORGANIZED HEALTH CARE EDUCATION/TRAINING PROGRAM

## 2024-04-08 PROCEDURE — 99232 SBSQ HOSP IP/OBS MODERATE 35: CPT | Performed by: INTERNAL MEDICINE

## 2024-04-08 PROCEDURE — 93308 TTE F-UP OR LMTD: CPT

## 2024-04-08 PROCEDURE — 80053 COMPREHEN METABOLIC PANEL: CPT

## 2024-04-08 PROCEDURE — 1100000003 HC PRIVATE W/ TELEMETRY

## 2024-04-08 PROCEDURE — 2580000003 HC RX 258: Performed by: STUDENT IN AN ORGANIZED HEALTH CARE EDUCATION/TRAINING PROGRAM

## 2024-04-08 PROCEDURE — 2700000000 HC OXYGEN THERAPY PER DAY

## 2024-04-08 PROCEDURE — 90935 HEMODIALYSIS ONE EVALUATION: CPT

## 2024-04-08 PROCEDURE — 85025 COMPLETE CBC W/AUTO DIFF WBC: CPT

## 2024-04-08 RX ORDER — IPRATROPIUM BROMIDE AND ALBUTEROL SULFATE 2.5; .5 MG/3ML; MG/3ML
1 SOLUTION RESPIRATORY (INHALATION)
Status: DISCONTINUED | OUTPATIENT
Start: 2024-04-08 | End: 2024-04-21

## 2024-04-08 RX ADMIN — TRAMADOL HYDROCHLORIDE 50 MG: 50 TABLET ORAL at 06:36

## 2024-04-08 RX ADMIN — FOLIC ACID 1 MG: 1 TABLET ORAL at 14:13

## 2024-04-08 RX ADMIN — ASPIRIN 81 MG CHEWABLE TABLET 81 MG: 81 TABLET CHEWABLE at 14:00

## 2024-04-08 RX ADMIN — WATER 40 MG: 1 INJECTION INTRAMUSCULAR; INTRAVENOUS; SUBCUTANEOUS at 15:32

## 2024-04-08 RX ADMIN — OXYCODONE AND ACETAMINOPHEN 1 TABLET: 5; 325 TABLET ORAL at 14:15

## 2024-04-08 RX ADMIN — INSULIN LISPRO 6 UNITS: 100 INJECTION, SOLUTION INTRAVENOUS; SUBCUTANEOUS at 14:17

## 2024-04-08 RX ADMIN — ARFORMOTEROL TARTRATE 15 MCG: 15 SOLUTION RESPIRATORY (INHALATION) at 20:19

## 2024-04-08 RX ADMIN — SEVELAMER CARBONATE 800 MG: 800 TABLET, FILM COATED ORAL at 14:01

## 2024-04-08 RX ADMIN — MIDODRINE HYDROCHLORIDE 10 MG: 10 TABLET ORAL at 08:37

## 2024-04-08 RX ADMIN — ATORVASTATIN CALCIUM 80 MG: 40 TABLET, FILM COATED ORAL at 20:51

## 2024-04-08 RX ADMIN — IPRATROPIUM BROMIDE AND ALBUTEROL SULFATE 1 DOSE: .5; 3 SOLUTION RESPIRATORY (INHALATION) at 08:11

## 2024-04-08 RX ADMIN — CYANOCOBALAMIN TAB 1000 MCG 1000 MCG: 1000 TAB at 14:00

## 2024-04-08 RX ADMIN — SODIUM CHLORIDE, PRESERVATIVE FREE 10 ML: 5 INJECTION INTRAVENOUS at 14:16

## 2024-04-08 RX ADMIN — SODIUM CHLORIDE, PRESERVATIVE FREE 10 ML: 5 INJECTION INTRAVENOUS at 21:00

## 2024-04-08 RX ADMIN — HEPARIN SODIUM 3300 UNITS: 1000 INJECTION INTRAVENOUS; SUBCUTANEOUS at 15:32

## 2024-04-08 RX ADMIN — EZETIMIBE 10 MG: 10 TABLET ORAL at 14:00

## 2024-04-08 RX ADMIN — GABAPENTIN 300 MG: 300 CAPSULE ORAL at 17:16

## 2024-04-08 RX ADMIN — CHOLECALCIFEROL TAB 25 MCG (1000 UNIT) 2000 UNITS: 25 TAB at 14:00

## 2024-04-08 RX ADMIN — BUDESONIDE 1 MG: 1 SUSPENSION RESPIRATORY (INHALATION) at 08:11

## 2024-04-08 RX ADMIN — ALLOPURINOL 100 MG: 100 TABLET ORAL at 20:51

## 2024-04-08 RX ADMIN — INSULIN LISPRO 2 UNITS: 100 INJECTION, SOLUTION INTRAVENOUS; SUBCUTANEOUS at 08:30

## 2024-04-08 RX ADMIN — DOXYCYCLINE HYCLATE 100 MG: 100 CAPSULE ORAL at 20:51

## 2024-04-08 RX ADMIN — SEVELAMER CARBONATE 800 MG: 800 TABLET, FILM COATED ORAL at 17:15

## 2024-04-08 RX ADMIN — PANTOPRAZOLE SODIUM 40 MG: 40 TABLET, DELAYED RELEASE ORAL at 14:01

## 2024-04-08 RX ADMIN — MIDODRINE HYDROCHLORIDE 10 MG: 10 TABLET ORAL at 15:32

## 2024-04-08 RX ADMIN — BUDESONIDE 1 MG: 1 SUSPENSION RESPIRATORY (INHALATION) at 20:19

## 2024-04-08 RX ADMIN — GUAIFENESIN 600 MG: 600 TABLET, EXTENDED RELEASE ORAL at 20:51

## 2024-04-08 RX ADMIN — INSULIN GLARGINE 15 UNITS: 100 INJECTION, SOLUTION SUBCUTANEOUS at 21:03

## 2024-04-08 RX ADMIN — ARFORMOTEROL TARTRATE 15 MCG: 15 SOLUTION RESPIRATORY (INHALATION) at 08:11

## 2024-04-08 RX ADMIN — IPRATROPIUM BROMIDE AND ALBUTEROL SULFATE 1 DOSE: .5; 3 SOLUTION RESPIRATORY (INHALATION) at 20:19

## 2024-04-08 RX ADMIN — OXYCODONE AND ACETAMINOPHEN 1 TABLET: 5; 325 TABLET ORAL at 21:00

## 2024-04-08 ASSESSMENT — PAIN SCALES - GENERAL
PAINLEVEL_OUTOF10: 6
PAINLEVEL_OUTOF10: 10
PAINLEVEL_OUTOF10: 6
PAINLEVEL_OUTOF10: 7
PAINLEVEL_OUTOF10: 10
PAINLEVEL_OUTOF10: 3

## 2024-04-08 ASSESSMENT — PAIN DESCRIPTION - ONSET
ONSET: ON-GOING

## 2024-04-08 ASSESSMENT — PAIN DESCRIPTION - LOCATION
LOCATION: BACK
LOCATION_2: NOSE
LOCATION: BACK;NOSE
LOCATION: BACK;NOSE
LOCATION: BACK
LOCATION_2: NOSE
LOCATION: BACK;NOSE

## 2024-04-08 ASSESSMENT — PAIN DESCRIPTION - PAIN TYPE
TYPE: ACUTE PAIN

## 2024-04-08 ASSESSMENT — PAIN - FUNCTIONAL ASSESSMENT
PAIN_FUNCTIONAL_ASSESSMENT_SITE2: ACTIVITIES ARE NOT PREVENTED
PAIN_FUNCTIONAL_ASSESSMENT: PREVENTS OR INTERFERES SOME ACTIVE ACTIVITIES AND ADLS
PAIN_FUNCTIONAL_ASSESSMENT: PREVENTS OR INTERFERES SOME ACTIVE ACTIVITIES AND ADLS
PAIN_FUNCTIONAL_ASSESSMENT_SITE2: ACTIVITIES ARE NOT PREVENTED
PAIN_FUNCTIONAL_ASSESSMENT: PREVENTS OR INTERFERES SOME ACTIVE ACTIVITIES AND ADLS

## 2024-04-08 ASSESSMENT — PAIN DESCRIPTION - ORIENTATION
ORIENTATION: ANTERIOR;POSTERIOR
ORIENTATION_2: ANTERIOR
ORIENTATION: POSTERIOR
ORIENTATION: MID
ORIENTATION_2: ANTERIOR
ORIENTATION: POSTERIOR;MID
ORIENTATION: POSTERIOR

## 2024-04-08 ASSESSMENT — PAIN DESCRIPTION - INTENSITY
RATING_2: 3
RATING_2: 6

## 2024-04-08 ASSESSMENT — PAIN DESCRIPTION - DESCRIPTORS
DESCRIPTORS: ACHING;SORE
DESCRIPTORS: ACHING;SORE
DESCRIPTORS: SHARP
DESCRIPTORS: ACHING;SORE
DESCRIPTORS_2: ACHING;SORE
DESCRIPTORS: ACHING;SORE
DESCRIPTORS_2: ACHING;SORE

## 2024-04-08 ASSESSMENT — PAIN DESCRIPTION - FREQUENCY
FREQUENCY: CONTINUOUS
FREQUENCY: INTERMITTENT

## 2024-04-08 NOTE — CARE COORDINATION
04/08/24 1131   Service Assessment   Patient Orientation Unable to Assess;Other (see comment)  (Patient off the unit, at dialysis)     Shivani Jacinto BSW  Case Management

## 2024-04-09 LAB
ANION GAP SERPL CALC-SCNC: 8 MMOL/L (ref 3–18)
BASOPHILS # BLD: 0 K/UL (ref 0–0.1)
BASOPHILS NFR BLD: 0 % (ref 0–2)
BUN SERPL-MCNC: 37 MG/DL (ref 7–18)
BUN/CREAT SERPL: 9 (ref 12–20)
CALCIUM SERPL-MCNC: 8.9 MG/DL (ref 8.5–10.1)
CHLORIDE SERPL-SCNC: 97 MMOL/L (ref 100–111)
CO2 SERPL-SCNC: 28 MMOL/L (ref 21–32)
CREAT SERPL-MCNC: 4.11 MG/DL (ref 0.6–1.3)
DIFFERENTIAL METHOD BLD: ABNORMAL
EKG ATRIAL RATE: 66 BPM
EKG DIAGNOSIS: NORMAL
EKG P AXIS: 94 DEGREES
EKG P-R INTERVAL: 170 MS
EKG Q-T INTERVAL: 460 MS
EKG QRS DURATION: 96 MS
EKG QTC CALCULATION (BAZETT): 482 MS
EKG R AXIS: 107 DEGREES
EKG T AXIS: 86 DEGREES
EKG VENTRICULAR RATE: 66 BPM
EOSINOPHIL # BLD: 0 K/UL (ref 0–0.4)
EOSINOPHIL NFR BLD: 0 % (ref 0–5)
ERYTHROCYTE [DISTWIDTH] IN BLOOD BY AUTOMATED COUNT: 16.2 % (ref 11.6–14.5)
GLUCOSE BLD STRIP.AUTO-MCNC: 153 MG/DL (ref 70–110)
GLUCOSE BLD STRIP.AUTO-MCNC: 169 MG/DL (ref 70–110)
GLUCOSE BLD STRIP.AUTO-MCNC: 211 MG/DL (ref 70–110)
GLUCOSE BLD STRIP.AUTO-MCNC: 225 MG/DL (ref 70–110)
GLUCOSE BLD STRIP.AUTO-MCNC: 243 MG/DL (ref 70–110)
GLUCOSE SERPL-MCNC: 140 MG/DL (ref 74–99)
HCT VFR BLD AUTO: 24 % (ref 35–45)
HGB BLD-MCNC: 7.4 G/DL (ref 12–16)
IMM GRANULOCYTES # BLD AUTO: 0 K/UL (ref 0–0.04)
IMM GRANULOCYTES NFR BLD AUTO: 0 % (ref 0–0.5)
LYMPHOCYTES # BLD: 0.8 K/UL (ref 0.9–3.6)
LYMPHOCYTES NFR BLD: 6 % (ref 21–52)
MCH RBC QN AUTO: 29.7 PG (ref 24–34)
MCHC RBC AUTO-ENTMCNC: 30.8 G/DL (ref 31–37)
MCV RBC AUTO: 96.4 FL (ref 78–100)
MONOCYTES # BLD: 0.4 K/UL (ref 0.05–1.2)
MONOCYTES NFR BLD: 3 % (ref 3–10)
NEUTS BAND NFR BLD MANUAL: 1 %
NEUTS SEG # BLD: 11.5 K/UL (ref 1.8–8)
NEUTS SEG NFR BLD: 89 % (ref 40–73)
NRBC # BLD: 0.2 K/UL (ref 0–0.01)
NRBC BLD-RTO: 1.6 PER 100 WBC
OTHER CELLS NFR BLD MANUAL: 1
PLATELET # BLD AUTO: 329 K/UL (ref 135–420)
PLATELET COMMENT: ABNORMAL
PMV BLD AUTO: 10.1 FL (ref 9.2–11.8)
POTASSIUM SERPL-SCNC: 4.7 MMOL/L (ref 3.5–5.5)
RBC # BLD AUTO: 2.49 M/UL (ref 4.2–5.3)
RBC MORPH BLD: ABNORMAL
SODIUM SERPL-SCNC: 133 MMOL/L (ref 136–145)
WBC # BLD AUTO: 12.8 K/UL (ref 4.6–13.2)

## 2024-04-09 PROCEDURE — 99232 SBSQ HOSP IP/OBS MODERATE 35: CPT | Performed by: INTERNAL MEDICINE

## 2024-04-09 PROCEDURE — 97165 OT EVAL LOW COMPLEX 30 MIN: CPT

## 2024-04-09 PROCEDURE — 6360000002 HC RX W HCPCS: Performed by: STUDENT IN AN ORGANIZED HEALTH CARE EDUCATION/TRAINING PROGRAM

## 2024-04-09 PROCEDURE — 1100000003 HC PRIVATE W/ TELEMETRY

## 2024-04-09 PROCEDURE — 97535 SELF CARE MNGMENT TRAINING: CPT

## 2024-04-09 PROCEDURE — 2580000003 HC RX 258: Performed by: STUDENT IN AN ORGANIZED HEALTH CARE EDUCATION/TRAINING PROGRAM

## 2024-04-09 PROCEDURE — 6360000002 HC RX W HCPCS: Performed by: INTERNAL MEDICINE

## 2024-04-09 PROCEDURE — 97530 THERAPEUTIC ACTIVITIES: CPT

## 2024-04-09 PROCEDURE — 82962 GLUCOSE BLOOD TEST: CPT

## 2024-04-09 PROCEDURE — 2580000003 HC RX 258: Performed by: INTERNAL MEDICINE

## 2024-04-09 PROCEDURE — 94660 CPAP INITIATION&MGMT: CPT

## 2024-04-09 PROCEDURE — 93010 ELECTROCARDIOGRAM REPORT: CPT | Performed by: INTERNAL MEDICINE

## 2024-04-09 PROCEDURE — 94640 AIRWAY INHALATION TREATMENT: CPT

## 2024-04-09 PROCEDURE — 80048 BASIC METABOLIC PNL TOTAL CA: CPT

## 2024-04-09 PROCEDURE — 6370000000 HC RX 637 (ALT 250 FOR IP): Performed by: INTERNAL MEDICINE

## 2024-04-09 PROCEDURE — 36415 COLL VENOUS BLD VENIPUNCTURE: CPT

## 2024-04-09 PROCEDURE — 85025 COMPLETE CBC W/AUTO DIFF WBC: CPT

## 2024-04-09 PROCEDURE — 97162 PT EVAL MOD COMPLEX 30 MIN: CPT

## 2024-04-09 PROCEDURE — 94761 N-INVAS EAR/PLS OXIMETRY MLT: CPT

## 2024-04-09 PROCEDURE — 2700000000 HC OXYGEN THERAPY PER DAY

## 2024-04-09 PROCEDURE — 93005 ELECTROCARDIOGRAM TRACING: CPT | Performed by: INTERNAL MEDICINE

## 2024-04-09 PROCEDURE — 6370000000 HC RX 637 (ALT 250 FOR IP): Performed by: STUDENT IN AN ORGANIZED HEALTH CARE EDUCATION/TRAINING PROGRAM

## 2024-04-09 RX ORDER — ACETYLCYSTEINE 100 MG/ML
4 SOLUTION ORAL; RESPIRATORY (INHALATION) EVERY 4 HOURS
Status: DISCONTINUED | OUTPATIENT
Start: 2024-04-09 | End: 2024-04-09

## 2024-04-09 RX ORDER — ACETYLCYSTEINE 100 MG/ML
4 SOLUTION ORAL; RESPIRATORY (INHALATION)
Status: DISCONTINUED | OUTPATIENT
Start: 2024-04-10 | End: 2024-04-27

## 2024-04-09 RX ADMIN — OXYCODONE AND ACETAMINOPHEN 1 TABLET: 5; 325 TABLET ORAL at 05:50

## 2024-04-09 RX ADMIN — WATER 40 MG: 1 INJECTION INTRAMUSCULAR; INTRAVENOUS; SUBCUTANEOUS at 14:54

## 2024-04-09 RX ADMIN — GABAPENTIN 300 MG: 300 CAPSULE ORAL at 17:30

## 2024-04-09 RX ADMIN — GUAIFENESIN 600 MG: 600 TABLET, EXTENDED RELEASE ORAL at 09:11

## 2024-04-09 RX ADMIN — INSULIN LISPRO 2 UNITS: 100 INJECTION, SOLUTION INTRAVENOUS; SUBCUTANEOUS at 17:27

## 2024-04-09 RX ADMIN — SODIUM CHLORIDE, PRESERVATIVE FREE 10 ML: 5 INJECTION INTRAVENOUS at 09:12

## 2024-04-09 RX ADMIN — SEVELAMER CARBONATE 800 MG: 800 TABLET, FILM COATED ORAL at 17:27

## 2024-04-09 RX ADMIN — OXYCODONE AND ACETAMINOPHEN 1 TABLET: 5; 325 TABLET ORAL at 21:13

## 2024-04-09 RX ADMIN — INSULIN LISPRO 2 UNITS: 100 INJECTION, SOLUTION INTRAVENOUS; SUBCUTANEOUS at 12:30

## 2024-04-09 RX ADMIN — DOXYCYCLINE HYCLATE 100 MG: 100 CAPSULE ORAL at 09:11

## 2024-04-09 RX ADMIN — CHOLECALCIFEROL TAB 25 MCG (1000 UNIT) 2000 UNITS: 25 TAB at 09:10

## 2024-04-09 RX ADMIN — SEVELAMER CARBONATE 800 MG: 800 TABLET, FILM COATED ORAL at 12:30

## 2024-04-09 RX ADMIN — ONDANSETRON 4 MG: 2 INJECTION INTRAMUSCULAR; INTRAVENOUS at 20:44

## 2024-04-09 RX ADMIN — SEVELAMER CARBONATE 800 MG: 800 TABLET, FILM COATED ORAL at 09:10

## 2024-04-09 RX ADMIN — ARFORMOTEROL TARTRATE 15 MCG: 15 SOLUTION RESPIRATORY (INHALATION) at 08:01

## 2024-04-09 RX ADMIN — ASPIRIN 81 MG CHEWABLE TABLET 81 MG: 81 TABLET CHEWABLE at 09:10

## 2024-04-09 RX ADMIN — SODIUM CHLORIDE, PRESERVATIVE FREE 10 ML: 5 INJECTION INTRAVENOUS at 21:21

## 2024-04-09 RX ADMIN — BUDESONIDE 1 MG: 1 SUSPENSION RESPIRATORY (INHALATION) at 08:01

## 2024-04-09 RX ADMIN — IPRATROPIUM BROMIDE AND ALBUTEROL SULFATE 1 DOSE: .5; 3 SOLUTION RESPIRATORY (INHALATION) at 08:01

## 2024-04-09 RX ADMIN — WATER 40 MG: 1 INJECTION INTRAMUSCULAR; INTRAVENOUS; SUBCUTANEOUS at 04:52

## 2024-04-09 RX ADMIN — OXYCODONE AND ACETAMINOPHEN 1 TABLET: 5; 325 TABLET ORAL at 13:03

## 2024-04-09 RX ADMIN — ATORVASTATIN CALCIUM 80 MG: 40 TABLET, FILM COATED ORAL at 21:13

## 2024-04-09 RX ADMIN — INSULIN GLARGINE 15 UNITS: 100 INJECTION, SOLUTION SUBCUTANEOUS at 21:14

## 2024-04-09 RX ADMIN — EZETIMIBE 10 MG: 10 TABLET ORAL at 09:10

## 2024-04-09 RX ADMIN — IPRATROPIUM BROMIDE AND ALBUTEROL SULFATE 1 DOSE: .5; 3 SOLUTION RESPIRATORY (INHALATION) at 15:54

## 2024-04-09 RX ADMIN — FOLIC ACID 1 MG: 1 TABLET ORAL at 09:11

## 2024-04-09 RX ADMIN — HYDROMORPHONE HYDROCHLORIDE 0.25 MG: 1 INJECTION, SOLUTION INTRAMUSCULAR; INTRAVENOUS; SUBCUTANEOUS at 15:55

## 2024-04-09 RX ADMIN — BENZONATATE 100 MG: 100 CAPSULE ORAL at 06:02

## 2024-04-09 RX ADMIN — CYANOCOBALAMIN TAB 1000 MCG 1000 MCG: 1000 TAB at 09:10

## 2024-04-09 RX ADMIN — POLYETHYLENE GLYCOL 3350 17 G: 17 POWDER, FOR SOLUTION ORAL at 14:54

## 2024-04-09 RX ADMIN — DOXYCYCLINE HYCLATE 100 MG: 100 CAPSULE ORAL at 21:13

## 2024-04-09 RX ADMIN — GUAIFENESIN 600 MG: 600 TABLET, EXTENDED RELEASE ORAL at 21:13

## 2024-04-09 RX ADMIN — PANTOPRAZOLE SODIUM 40 MG: 40 TABLET, DELAYED RELEASE ORAL at 09:11

## 2024-04-09 ASSESSMENT — PAIN DESCRIPTION - DESCRIPTORS
DESCRIPTORS: SHARP
DESCRIPTORS: DISCOMFORT;SHARP
DESCRIPTORS: DISCOMFORT
DESCRIPTORS: DISCOMFORT
DESCRIPTORS: ACHING;SHARP;BURNING

## 2024-04-09 ASSESSMENT — PAIN DESCRIPTION - LOCATION
LOCATION: CHEST
LOCATION: CHEST;BACK
LOCATION: ABDOMEN;CHEST;BACK

## 2024-04-09 ASSESSMENT — PAIN SCALES - GENERAL
PAINLEVEL_OUTOF10: 9
PAINLEVEL_OUTOF10: 4
PAINLEVEL_OUTOF10: 9
PAINLEVEL_OUTOF10: 1
PAINLEVEL_OUTOF10: 7
PAINLEVEL_OUTOF10: 5
PAINLEVEL_OUTOF10: 3
PAINLEVEL_OUTOF10: 10
PAINLEVEL_OUTOF10: 5

## 2024-04-09 ASSESSMENT — PAIN DESCRIPTION - ORIENTATION
ORIENTATION: RIGHT
ORIENTATION: ANTERIOR
ORIENTATION: RIGHT;MID;ANTERIOR;POSTERIOR

## 2024-04-09 ASSESSMENT — PAIN - FUNCTIONAL ASSESSMENT
PAIN_FUNCTIONAL_ASSESSMENT: ACTIVITIES ARE NOT PREVENTED
PAIN_FUNCTIONAL_ASSESSMENT: PREVENTS OR INTERFERES SOME ACTIVE ACTIVITIES AND ADLS
PAIN_FUNCTIONAL_ASSESSMENT: ACTIVITIES ARE NOT PREVENTED
PAIN_FUNCTIONAL_ASSESSMENT: ACTIVITIES ARE NOT PREVENTED
PAIN_FUNCTIONAL_ASSESSMENT: PREVENTS OR INTERFERES SOME ACTIVE ACTIVITIES AND ADLS

## 2024-04-09 ASSESSMENT — PAIN DESCRIPTION - PAIN TYPE
TYPE: ACUTE PAIN
TYPE: ACUTE PAIN;CHRONIC PAIN
TYPE: ACUTE PAIN

## 2024-04-09 ASSESSMENT — PAIN SCALES - WONG BAKER: WONGBAKER_NUMERICALRESPONSE: NO HURT

## 2024-04-09 ASSESSMENT — PAIN DESCRIPTION - FREQUENCY
FREQUENCY: INTERMITTENT

## 2024-04-09 ASSESSMENT — PAIN DESCRIPTION - ONSET
ONSET: ON-GOING

## 2024-04-09 NOTE — CARE COORDINATION
EULALIO sent Dr. Márquez  Nephrology Physician a message via [employer] regarding pt outpatient dialysis, waiting for an update.     Shivani Jacinto BSW  Case Management

## 2024-04-10 LAB
ALBUMIN SERPL-MCNC: 3.4 G/DL (ref 3.4–5)
ALBUMIN/GLOB SERPL: 1.1 (ref 0.8–1.7)
ALP SERPL-CCNC: 116 U/L (ref 45–117)
ALT SERPL-CCNC: 14 U/L (ref 13–56)
ANION GAP SERPL CALC-SCNC: 8 MMOL/L (ref 3–18)
AST SERPL-CCNC: 14 U/L (ref 10–38)
BASOPHILS # BLD: 0 K/UL (ref 0–0.1)
BASOPHILS NFR BLD: 0 % (ref 0–2)
BILIRUB SERPL-MCNC: 0.5 MG/DL (ref 0.2–1)
BUN SERPL-MCNC: 64 MG/DL (ref 7–18)
BUN/CREAT SERPL: 11 (ref 12–20)
CALCIUM SERPL-MCNC: 8.7 MG/DL (ref 8.5–10.1)
CHLORIDE SERPL-SCNC: 92 MMOL/L (ref 100–111)
CO2 SERPL-SCNC: 28 MMOL/L (ref 21–32)
CREAT SERPL-MCNC: 5.61 MG/DL (ref 0.6–1.3)
DIFFERENTIAL METHOD BLD: ABNORMAL
EOSINOPHIL # BLD: 0 K/UL (ref 0–0.4)
EOSINOPHIL NFR BLD: 0 % (ref 0–5)
ERYTHROCYTE [DISTWIDTH] IN BLOOD BY AUTOMATED COUNT: 15.9 % (ref 11.6–14.5)
GLOBULIN SER CALC-MCNC: 3 G/DL (ref 2–4)
GLUCOSE BLD STRIP.AUTO-MCNC: 186 MG/DL (ref 70–110)
GLUCOSE BLD STRIP.AUTO-MCNC: 201 MG/DL (ref 70–110)
GLUCOSE BLD STRIP.AUTO-MCNC: 230 MG/DL (ref 70–110)
GLUCOSE BLD STRIP.AUTO-MCNC: 245 MG/DL (ref 70–110)
GLUCOSE SERPL-MCNC: 183 MG/DL (ref 74–99)
HCT VFR BLD AUTO: 22.4 % (ref 35–45)
HGB BLD-MCNC: 7.1 G/DL (ref 12–16)
IMM GRANULOCYTES # BLD AUTO: 0 K/UL (ref 0–0.04)
IMM GRANULOCYTES NFR BLD AUTO: 0 % (ref 0–0.5)
LYMPHOCYTES # BLD: 1.8 K/UL (ref 0.9–3.6)
LYMPHOCYTES NFR BLD: 15 % (ref 21–52)
MCH RBC QN AUTO: 29.8 PG (ref 24–34)
MCHC RBC AUTO-ENTMCNC: 31.7 G/DL (ref 31–37)
MCV RBC AUTO: 94.1 FL (ref 78–100)
MONOCYTES # BLD: 0.4 K/UL (ref 0.05–1.2)
MONOCYTES NFR BLD: 3 % (ref 3–10)
NEUTS SEG # BLD: 9.6 K/UL (ref 1.8–8)
NEUTS SEG NFR BLD: 82 % (ref 40–73)
NRBC # BLD: 0.41 K/UL (ref 0–0.01)
NRBC BLD-RTO: 3.5 PER 100 WBC
PLATELET # BLD AUTO: 326 K/UL (ref 135–420)
PLATELET COMMENT: ABNORMAL
PMV BLD AUTO: 10.1 FL (ref 9.2–11.8)
POTASSIUM SERPL-SCNC: 5.5 MMOL/L (ref 3.5–5.5)
PROT SERPL-MCNC: 6.4 G/DL (ref 6.4–8.2)
RBC # BLD AUTO: 2.38 M/UL (ref 4.2–5.3)
RBC MORPH BLD: ABNORMAL
RBC MORPH BLD: ABNORMAL
SODIUM SERPL-SCNC: 128 MMOL/L (ref 136–145)
WBC # BLD AUTO: 11.8 K/UL (ref 4.6–13.2)

## 2024-04-10 PROCEDURE — 6360000002 HC RX W HCPCS: Performed by: INTERNAL MEDICINE

## 2024-04-10 PROCEDURE — 2700000000 HC OXYGEN THERAPY PER DAY

## 2024-04-10 PROCEDURE — 6370000000 HC RX 637 (ALT 250 FOR IP): Performed by: STUDENT IN AN ORGANIZED HEALTH CARE EDUCATION/TRAINING PROGRAM

## 2024-04-10 PROCEDURE — 2580000003 HC RX 258: Performed by: INTERNAL MEDICINE

## 2024-04-10 PROCEDURE — 1100000003 HC PRIVATE W/ TELEMETRY

## 2024-04-10 PROCEDURE — 80053 COMPREHEN METABOLIC PANEL: CPT

## 2024-04-10 PROCEDURE — 6370000000 HC RX 637 (ALT 250 FOR IP): Performed by: INTERNAL MEDICINE

## 2024-04-10 PROCEDURE — 82962 GLUCOSE BLOOD TEST: CPT

## 2024-04-10 PROCEDURE — 6370000000 HC RX 637 (ALT 250 FOR IP)

## 2024-04-10 PROCEDURE — 94761 N-INVAS EAR/PLS OXIMETRY MLT: CPT

## 2024-04-10 PROCEDURE — 36415 COLL VENOUS BLD VENIPUNCTURE: CPT

## 2024-04-10 PROCEDURE — 99232 SBSQ HOSP IP/OBS MODERATE 35: CPT | Performed by: INTERNAL MEDICINE

## 2024-04-10 PROCEDURE — 99222 1ST HOSP IP/OBS MODERATE 55: CPT | Performed by: INTERNAL MEDICINE

## 2024-04-10 PROCEDURE — 94640 AIRWAY INHALATION TREATMENT: CPT

## 2024-04-10 PROCEDURE — 2580000003 HC RX 258: Performed by: STUDENT IN AN ORGANIZED HEALTH CARE EDUCATION/TRAINING PROGRAM

## 2024-04-10 PROCEDURE — 85025 COMPLETE CBC W/AUTO DIFF WBC: CPT

## 2024-04-10 PROCEDURE — 90935 HEMODIALYSIS ONE EVALUATION: CPT

## 2024-04-10 PROCEDURE — 6360000002 HC RX W HCPCS: Performed by: STUDENT IN AN ORGANIZED HEALTH CARE EDUCATION/TRAINING PROGRAM

## 2024-04-10 RX ORDER — HYDROCODONE POLISTIREX AND CHLORPHENIRAMINE POLISTIREX 10; 8 MG/5ML; MG/5ML
5 SUSPENSION, EXTENDED RELEASE ORAL EVERY 12 HOURS PRN
Status: DISCONTINUED | OUTPATIENT
Start: 2024-04-12 | End: 2024-05-07 | Stop reason: HOSPADM

## 2024-04-10 RX ORDER — HYDROCODONE POLISTIREX AND CHLORPHENIRAMINE POLISTIREX 10; 8 MG/5ML; MG/5ML
5 SUSPENSION, EXTENDED RELEASE ORAL 2 TIMES DAILY
Status: COMPLETED | OUTPATIENT
Start: 2024-04-10 | End: 2024-04-12

## 2024-04-10 RX ADMIN — SEVELAMER CARBONATE 800 MG: 800 TABLET, FILM COATED ORAL at 17:01

## 2024-04-10 RX ADMIN — INSULIN LISPRO 2 UNITS: 100 INJECTION, SOLUTION INTRAVENOUS; SUBCUTANEOUS at 17:01

## 2024-04-10 RX ADMIN — WATER 40 MG: 1 INJECTION INTRAMUSCULAR; INTRAVENOUS; SUBCUTANEOUS at 13:52

## 2024-04-10 RX ADMIN — ARFORMOTEROL TARTRATE 15 MCG: 15 SOLUTION RESPIRATORY (INHALATION) at 19:24

## 2024-04-10 RX ADMIN — BUDESONIDE 1 MG: 1 SUSPENSION RESPIRATORY (INHALATION) at 07:36

## 2024-04-10 RX ADMIN — CHOLECALCIFEROL TAB 25 MCG (1000 UNIT) 2000 UNITS: 25 TAB at 08:41

## 2024-04-10 RX ADMIN — ALLOPURINOL 100 MG: 100 TABLET ORAL at 20:39

## 2024-04-10 RX ADMIN — WATER 40 MG: 1 INJECTION INTRAMUSCULAR; INTRAVENOUS; SUBCUTANEOUS at 04:56

## 2024-04-10 RX ADMIN — PANTOPRAZOLE SODIUM 40 MG: 40 TABLET, DELAYED RELEASE ORAL at 08:40

## 2024-04-10 RX ADMIN — ONDANSETRON 4 MG: 2 INJECTION INTRAMUSCULAR; INTRAVENOUS at 09:34

## 2024-04-10 RX ADMIN — CYANOCOBALAMIN TAB 1000 MCG 1000 MCG: 1000 TAB at 08:40

## 2024-04-10 RX ADMIN — INSULIN GLARGINE 15 UNITS: 100 INJECTION, SOLUTION SUBCUTANEOUS at 20:39

## 2024-04-10 RX ADMIN — SODIUM CHLORIDE, PRESERVATIVE FREE 10 ML: 5 INJECTION INTRAVENOUS at 08:42

## 2024-04-10 RX ADMIN — ACETAMINOPHEN 325MG 650 MG: 325 TABLET ORAL at 09:28

## 2024-04-10 RX ADMIN — HYDROCODONE POLISTIREX AND CHLORPHENIRAMINE POLISTIREX 5 ML: 10; 8 SUSPENSION, EXTENDED RELEASE ORAL at 17:24

## 2024-04-10 RX ADMIN — OXYCODONE AND ACETAMINOPHEN 1 TABLET: 5; 325 TABLET ORAL at 13:53

## 2024-04-10 RX ADMIN — ARFORMOTEROL TARTRATE 15 MCG: 15 SOLUTION RESPIRATORY (INHALATION) at 07:36

## 2024-04-10 RX ADMIN — IPRATROPIUM BROMIDE AND ALBUTEROL SULFATE 1 DOSE: .5; 3 SOLUTION RESPIRATORY (INHALATION) at 14:53

## 2024-04-10 RX ADMIN — BENZONATATE 100 MG: 100 CAPSULE ORAL at 15:20

## 2024-04-10 RX ADMIN — BUDESONIDE 1 MG: 1 SUSPENSION RESPIRATORY (INHALATION) at 19:24

## 2024-04-10 RX ADMIN — EZETIMIBE 10 MG: 10 TABLET ORAL at 08:40

## 2024-04-10 RX ADMIN — IPRATROPIUM BROMIDE AND ALBUTEROL SULFATE 1 DOSE: .5; 3 SOLUTION RESPIRATORY (INHALATION) at 19:24

## 2024-04-10 RX ADMIN — OXYCODONE AND ACETAMINOPHEN 1 TABLET: 5; 325 TABLET ORAL at 20:47

## 2024-04-10 RX ADMIN — GABAPENTIN 300 MG: 300 CAPSULE ORAL at 17:32

## 2024-04-10 RX ADMIN — SEVELAMER CARBONATE 800 MG: 800 TABLET, FILM COATED ORAL at 08:40

## 2024-04-10 RX ADMIN — BENZONATATE 100 MG: 100 CAPSULE ORAL at 04:56

## 2024-04-10 RX ADMIN — IPRATROPIUM BROMIDE AND ALBUTEROL SULFATE 1 DOSE: .5; 3 SOLUTION RESPIRATORY (INHALATION) at 07:36

## 2024-04-10 RX ADMIN — OXYCODONE AND ACETAMINOPHEN 1 TABLET: 5; 325 TABLET ORAL at 04:53

## 2024-04-10 RX ADMIN — ATORVASTATIN CALCIUM 80 MG: 40 TABLET, FILM COATED ORAL at 20:39

## 2024-04-10 RX ADMIN — SEVELAMER CARBONATE 800 MG: 800 TABLET, FILM COATED ORAL at 13:52

## 2024-04-10 RX ADMIN — FOLIC ACID 1 MG: 1 TABLET ORAL at 08:41

## 2024-04-10 RX ADMIN — GUAIFENESIN 600 MG: 600 TABLET, EXTENDED RELEASE ORAL at 08:40

## 2024-04-10 RX ADMIN — GUAIFENESIN 600 MG: 600 TABLET, EXTENDED RELEASE ORAL at 20:39

## 2024-04-10 RX ADMIN — SODIUM CHLORIDE, PRESERVATIVE FREE 10 ML: 5 INJECTION INTRAVENOUS at 20:40

## 2024-04-10 RX ADMIN — ACETYLCYSTEINE 400 MG: 100 SOLUTION ORAL; RESPIRATORY (INHALATION) at 19:24

## 2024-04-10 RX ADMIN — ASPIRIN 81 MG CHEWABLE TABLET 81 MG: 81 TABLET CHEWABLE at 08:41

## 2024-04-10 RX ADMIN — INSULIN LISPRO 2 UNITS: 100 INJECTION, SOLUTION INTRAVENOUS; SUBCUTANEOUS at 13:51

## 2024-04-10 ASSESSMENT — PAIN DESCRIPTION - ORIENTATION
ORIENTATION: RIGHT;MID
ORIENTATION: ANTERIOR;POSTERIOR
ORIENTATION: MID
ORIENTATION: POSTERIOR
ORIENTATION: POSTERIOR

## 2024-04-10 ASSESSMENT — PAIN DESCRIPTION - DESCRIPTORS
DESCRIPTORS: ACHING;DISCOMFORT
DESCRIPTORS: ACHING
DESCRIPTORS: ACHING;SHARP
DESCRIPTORS: DISCOMFORT;ACHING
DESCRIPTORS: ACHING;DISCOMFORT
DESCRIPTORS: ACHING
DESCRIPTORS: DISCOMFORT

## 2024-04-10 ASSESSMENT — PAIN SCALES - GENERAL
PAINLEVEL_OUTOF10: 10
PAINLEVEL_OUTOF10: 6
PAINLEVEL_OUTOF10: 10
PAINLEVEL_OUTOF10: 0
PAINLEVEL_OUTOF10: 10
PAINLEVEL_OUTOF10: 4
PAINLEVEL_OUTOF10: 1
PAINLEVEL_OUTOF10: 5
PAINLEVEL_OUTOF10: 0
PAINLEVEL_OUTOF10: 0
PAINLEVEL_OUTOF10: 10
PAINLEVEL_OUTOF10: 8
PAINLEVEL_OUTOF10: 10

## 2024-04-10 ASSESSMENT — PAIN DESCRIPTION - ONSET
ONSET: ON-GOING
ONSET: ON-GOING

## 2024-04-10 ASSESSMENT — PAIN DESCRIPTION - FREQUENCY
FREQUENCY: CONTINUOUS
FREQUENCY: CONTINUOUS

## 2024-04-10 ASSESSMENT — PAIN - FUNCTIONAL ASSESSMENT
PAIN_FUNCTIONAL_ASSESSMENT: ACTIVITIES ARE NOT PREVENTED
PAIN_FUNCTIONAL_ASSESSMENT: PREVENTS OR INTERFERES SOME ACTIVE ACTIVITIES AND ADLS

## 2024-04-10 ASSESSMENT — PAIN DESCRIPTION - LOCATION
LOCATION: BACK
LOCATION: BACK
LOCATION: ABDOMEN;CHEST;BACK
LOCATION: BACK

## 2024-04-10 ASSESSMENT — PAIN DESCRIPTION - PAIN TYPE
TYPE: ACUTE PAIN
TYPE: ACUTE PAIN

## 2024-04-10 NOTE — CARE COORDINATION
Per Dr. Márquez Nephrology Physician pt has dialysis Monday, Wednesday, and Friday at [facility].    Shivani Jacinto BSW  Case Management

## 2024-04-11 PROBLEM — J44.1 COPD EXACERBATION (HCC): Status: ACTIVE | Noted: 2024-04-11

## 2024-04-11 LAB
ALBUMIN SERPL-MCNC: 3.1 G/DL (ref 3.4–5)
ALBUMIN/GLOB SERPL: 1 (ref 0.8–1.7)
ALP SERPL-CCNC: 103 U/L (ref 45–117)
ALT SERPL-CCNC: 14 U/L (ref 13–56)
ANION GAP SERPL CALC-SCNC: 5 MMOL/L (ref 3–18)
AST SERPL-CCNC: 12 U/L (ref 10–38)
B PERT DNA SPEC QL NAA+PROBE: NOT DETECTED
BASOPHILS # BLD: 0 K/UL (ref 0–0.1)
BASOPHILS NFR BLD: 0 % (ref 0–2)
BILIRUB SERPL-MCNC: 1 MG/DL (ref 0.2–1)
BORDETELLA PARAPERTUSSIS BY PCR: NOT DETECTED
BUN SERPL-MCNC: 41 MG/DL (ref 7–18)
BUN/CREAT SERPL: 10 (ref 12–20)
C PNEUM DNA SPEC QL NAA+PROBE: NOT DETECTED
CALCIUM SERPL-MCNC: 8.5 MG/DL (ref 8.5–10.1)
CHLORIDE SERPL-SCNC: 100 MMOL/L (ref 100–111)
CO2 SERPL-SCNC: 27 MMOL/L (ref 21–32)
CREAT SERPL-MCNC: 4.01 MG/DL (ref 0.6–1.3)
DIFFERENTIAL METHOD BLD: ABNORMAL
EOSINOPHIL # BLD: 0 K/UL (ref 0–0.4)
EOSINOPHIL NFR BLD: 0 % (ref 0–5)
ERYTHROCYTE [DISTWIDTH] IN BLOOD BY AUTOMATED COUNT: 15.8 % (ref 11.6–14.5)
FLUAV SUBTYP SPEC NAA+PROBE: NOT DETECTED
FLUBV RNA SPEC QL NAA+PROBE: NOT DETECTED
GLOBULIN SER CALC-MCNC: 3.1 G/DL (ref 2–4)
GLUCOSE BLD STRIP.AUTO-MCNC: 147 MG/DL (ref 70–110)
GLUCOSE BLD STRIP.AUTO-MCNC: 150 MG/DL (ref 70–110)
GLUCOSE BLD STRIP.AUTO-MCNC: 192 MG/DL (ref 70–110)
GLUCOSE BLD STRIP.AUTO-MCNC: 234 MG/DL (ref 70–110)
GLUCOSE SERPL-MCNC: 103 MG/DL (ref 74–99)
HADV DNA SPEC QL NAA+PROBE: NOT DETECTED
HCOV 229E RNA SPEC QL NAA+PROBE: NOT DETECTED
HCOV HKU1 RNA SPEC QL NAA+PROBE: NOT DETECTED
HCOV NL63 RNA SPEC QL NAA+PROBE: NOT DETECTED
HCOV OC43 RNA SPEC QL NAA+PROBE: NOT DETECTED
HCT VFR BLD AUTO: 22.6 % (ref 35–45)
HGB BLD-MCNC: 7.1 G/DL (ref 12–16)
HMPV RNA SPEC QL NAA+PROBE: NOT DETECTED
HPIV1 RNA SPEC QL NAA+PROBE: NOT DETECTED
HPIV2 RNA SPEC QL NAA+PROBE: NOT DETECTED
HPIV3 RNA SPEC QL NAA+PROBE: NOT DETECTED
HPIV4 RNA SPEC QL NAA+PROBE: NOT DETECTED
IMM GRANULOCYTES # BLD AUTO: 0 K/UL (ref 0–0.04)
IMM GRANULOCYTES NFR BLD AUTO: 0 % (ref 0–0.5)
LYMPHOCYTES # BLD: 1.7 K/UL (ref 0.9–3.6)
LYMPHOCYTES NFR BLD: 15 % (ref 21–52)
M PNEUMO DNA SPEC QL NAA+PROBE: NOT DETECTED
MCH RBC QN AUTO: 30.2 PG (ref 24–34)
MCHC RBC AUTO-ENTMCNC: 31.4 G/DL (ref 31–37)
MCV RBC AUTO: 96.2 FL (ref 78–100)
MONOCYTES # BLD: 0.8 K/UL (ref 0.05–1.2)
MONOCYTES NFR BLD: 7 % (ref 3–10)
NEUTS SEG # BLD: 8.9 K/UL (ref 1.8–8)
NEUTS SEG NFR BLD: 78 % (ref 40–73)
NRBC # BLD: 0.55 K/UL (ref 0–0.01)
NRBC BLD-RTO: 4.8 PER 100 WBC
PLATELET # BLD AUTO: 345 K/UL (ref 135–420)
PLATELET COMMENT: ABNORMAL
PMV BLD AUTO: 9.8 FL (ref 9.2–11.8)
POTASSIUM SERPL-SCNC: 5.3 MMOL/L (ref 3.5–5.5)
PROCALCITONIN SERPL-MCNC: 0.85 NG/ML
PROT SERPL-MCNC: 6.2 G/DL (ref 6.4–8.2)
RBC # BLD AUTO: 2.35 M/UL (ref 4.2–5.3)
RBC MORPH BLD: ABNORMAL
RBC MORPH BLD: ABNORMAL
RSV RNA SPEC QL NAA+PROBE: NOT DETECTED
RV+EV RNA SPEC QL NAA+PROBE: NOT DETECTED
SARS-COV-2 RNA RESP QL NAA+PROBE: NOT DETECTED
SODIUM SERPL-SCNC: 132 MMOL/L (ref 136–145)
WBC # BLD AUTO: 11.4 K/UL (ref 4.6–13.2)

## 2024-04-11 PROCEDURE — APPSS15 APP SPLIT SHARED TIME 0-15 MINUTES

## 2024-04-11 PROCEDURE — 6360000002 HC RX W HCPCS: Performed by: INTERNAL MEDICINE

## 2024-04-11 PROCEDURE — 99232 SBSQ HOSP IP/OBS MODERATE 35: CPT | Performed by: INTERNAL MEDICINE

## 2024-04-11 PROCEDURE — 1100000003 HC PRIVATE W/ TELEMETRY

## 2024-04-11 PROCEDURE — 94640 AIRWAY INHALATION TREATMENT: CPT

## 2024-04-11 PROCEDURE — 6370000000 HC RX 637 (ALT 250 FOR IP)

## 2024-04-11 PROCEDURE — 6370000000 HC RX 637 (ALT 250 FOR IP): Performed by: INTERNAL MEDICINE

## 2024-04-11 PROCEDURE — 2580000003 HC RX 258: Performed by: STUDENT IN AN ORGANIZED HEALTH CARE EDUCATION/TRAINING PROGRAM

## 2024-04-11 PROCEDURE — 2580000003 HC RX 258: Performed by: INTERNAL MEDICINE

## 2024-04-11 PROCEDURE — 6370000000 HC RX 637 (ALT 250 FOR IP): Performed by: STUDENT IN AN ORGANIZED HEALTH CARE EDUCATION/TRAINING PROGRAM

## 2024-04-11 PROCEDURE — 85025 COMPLETE CBC W/AUTO DIFF WBC: CPT

## 2024-04-11 PROCEDURE — 0202U NFCT DS 22 TRGT SARS-COV-2: CPT

## 2024-04-11 PROCEDURE — 82962 GLUCOSE BLOOD TEST: CPT

## 2024-04-11 PROCEDURE — 94669 MECHANICAL CHEST WALL OSCILL: CPT

## 2024-04-11 PROCEDURE — 36415 COLL VENOUS BLD VENIPUNCTURE: CPT

## 2024-04-11 PROCEDURE — 84145 PROCALCITONIN (PCT): CPT

## 2024-04-11 PROCEDURE — 80053 COMPREHEN METABOLIC PANEL: CPT

## 2024-04-11 RX ADMIN — INSULIN GLARGINE 15 UNITS: 100 INJECTION, SOLUTION SUBCUTANEOUS at 21:54

## 2024-04-11 RX ADMIN — FOLIC ACID 1 MG: 1 TABLET ORAL at 09:36

## 2024-04-11 RX ADMIN — WATER 40 MG: 1 INJECTION INTRAMUSCULAR; INTRAVENOUS; SUBCUTANEOUS at 03:19

## 2024-04-11 RX ADMIN — MIDODRINE HYDROCHLORIDE 10 MG: 10 TABLET ORAL at 21:54

## 2024-04-11 RX ADMIN — ACETYLCYSTEINE 400 MG: 100 SOLUTION ORAL; RESPIRATORY (INHALATION) at 14:14

## 2024-04-11 RX ADMIN — HYDROCODONE POLISTIREX AND CHLORPHENIRAMINE POLISTIREX 5 ML: 10; 8 SUSPENSION, EXTENDED RELEASE ORAL at 09:40

## 2024-04-11 RX ADMIN — CYANOCOBALAMIN TAB 1000 MCG 1000 MCG: 1000 TAB at 09:37

## 2024-04-11 RX ADMIN — SEVELAMER CARBONATE 800 MG: 800 TABLET, FILM COATED ORAL at 13:28

## 2024-04-11 RX ADMIN — OXYCODONE AND ACETAMINOPHEN 1 TABLET: 5; 325 TABLET ORAL at 16:00

## 2024-04-11 RX ADMIN — SEVELAMER CARBONATE 800 MG: 800 TABLET, FILM COATED ORAL at 09:36

## 2024-04-11 RX ADMIN — PANTOPRAZOLE SODIUM 40 MG: 40 TABLET, DELAYED RELEASE ORAL at 09:37

## 2024-04-11 RX ADMIN — ARFORMOTEROL TARTRATE 15 MCG: 15 SOLUTION RESPIRATORY (INHALATION) at 19:43

## 2024-04-11 RX ADMIN — BENZONATATE 100 MG: 100 CAPSULE ORAL at 16:00

## 2024-04-11 RX ADMIN — OXYCODONE AND ACETAMINOPHEN 1 TABLET: 5; 325 TABLET ORAL at 21:54

## 2024-04-11 RX ADMIN — HYDROCODONE POLISTIREX AND CHLORPHENIRAMINE POLISTIREX 5 ML: 10; 8 SUSPENSION, EXTENDED RELEASE ORAL at 21:55

## 2024-04-11 RX ADMIN — ATORVASTATIN CALCIUM 80 MG: 40 TABLET, FILM COATED ORAL at 21:53

## 2024-04-11 RX ADMIN — OXYCODONE AND ACETAMINOPHEN 1 TABLET: 5; 325 TABLET ORAL at 09:49

## 2024-04-11 RX ADMIN — IPRATROPIUM BROMIDE AND ALBUTEROL SULFATE 1 DOSE: .5; 3 SOLUTION RESPIRATORY (INHALATION) at 19:43

## 2024-04-11 RX ADMIN — GABAPENTIN 300 MG: 300 CAPSULE ORAL at 17:16

## 2024-04-11 RX ADMIN — IPRATROPIUM BROMIDE AND ALBUTEROL SULFATE 1 DOSE: .5; 3 SOLUTION RESPIRATORY (INHALATION) at 14:14

## 2024-04-11 RX ADMIN — SODIUM CHLORIDE, PRESERVATIVE FREE 10 ML: 5 INJECTION INTRAVENOUS at 23:38

## 2024-04-11 RX ADMIN — CHOLECALCIFEROL TAB 25 MCG (1000 UNIT) 2000 UNITS: 25 TAB at 09:36

## 2024-04-11 RX ADMIN — BUDESONIDE 1 MG: 1 SUSPENSION RESPIRATORY (INHALATION) at 19:43

## 2024-04-11 RX ADMIN — BENZONATATE 100 MG: 100 CAPSULE ORAL at 09:37

## 2024-04-11 RX ADMIN — SEVELAMER CARBONATE 800 MG: 800 TABLET, FILM COATED ORAL at 17:17

## 2024-04-11 RX ADMIN — EZETIMIBE 10 MG: 10 TABLET ORAL at 09:36

## 2024-04-11 RX ADMIN — ACETYLCYSTEINE 400 MG: 100 SOLUTION ORAL; RESPIRATORY (INHALATION) at 19:44

## 2024-04-11 RX ADMIN — WATER 40 MG: 1 INJECTION INTRAMUSCULAR; INTRAVENOUS; SUBCUTANEOUS at 15:52

## 2024-04-11 RX ADMIN — ASPIRIN 81 MG CHEWABLE TABLET 81 MG: 81 TABLET CHEWABLE at 09:36

## 2024-04-11 RX ADMIN — SODIUM CHLORIDE, PRESERVATIVE FREE 10 ML: 5 INJECTION INTRAVENOUS at 09:42

## 2024-04-11 RX ADMIN — GUAIFENESIN 600 MG: 600 TABLET, EXTENDED RELEASE ORAL at 21:53

## 2024-04-11 RX ADMIN — GUAIFENESIN 600 MG: 600 TABLET, EXTENDED RELEASE ORAL at 09:36

## 2024-04-11 ASSESSMENT — PAIN SCALES - GENERAL
PAINLEVEL_OUTOF10: 7
PAINLEVEL_OUTOF10: 0
PAINLEVEL_OUTOF10: 0
PAINLEVEL_OUTOF10: 6
PAINLEVEL_OUTOF10: 10
PAINLEVEL_OUTOF10: 0
PAINLEVEL_OUTOF10: 10
PAINLEVEL_OUTOF10: 10
PAINLEVEL_OUTOF10: 6
PAINLEVEL_OUTOF10: 10

## 2024-04-11 ASSESSMENT — PAIN DESCRIPTION - ORIENTATION
ORIENTATION: POSTERIOR
ORIENTATION: RIGHT
ORIENTATION: POSTERIOR
ORIENTATION: RIGHT

## 2024-04-11 ASSESSMENT — PAIN DESCRIPTION - FREQUENCY
FREQUENCY: CONTINUOUS
FREQUENCY: INTERMITTENT

## 2024-04-11 ASSESSMENT — PAIN DESCRIPTION - LOCATION
LOCATION: BACK

## 2024-04-11 ASSESSMENT — PAIN - FUNCTIONAL ASSESSMENT
PAIN_FUNCTIONAL_ASSESSMENT: PREVENTS OR INTERFERES SOME ACTIVE ACTIVITIES AND ADLS
PAIN_FUNCTIONAL_ASSESSMENT: ACTIVITIES ARE NOT PREVENTED

## 2024-04-11 ASSESSMENT — PAIN DESCRIPTION - ONSET
ONSET: ON-GOING
ONSET: ON-GOING
ONSET: PROGRESSIVE
ONSET: GRADUAL

## 2024-04-11 ASSESSMENT — PAIN DESCRIPTION - DESCRIPTORS
DESCRIPTORS: STABBING
DESCRIPTORS: STABBING
DESCRIPTORS: THROBBING
DESCRIPTORS: THROBBING

## 2024-04-11 ASSESSMENT — PAIN DESCRIPTION - PAIN TYPE
TYPE: ACUTE PAIN

## 2024-04-11 NOTE — CARE COORDINATION
04/11/24 1623   Service Assessment   Patient Orientation Alert and Oriented   Cognition Alert   History Provided By Patient   Primary Caregiver Spouse   Support Systems None   PCP Verified by CM Yes   Last Visit to PCP Within last 3 months   Prior Functional Level Assistance with the following:   Current Functional Level Assistance with the following:   Can patient return to prior living arrangement Unknown at present   Ability to make needs known: Good   Family able to assist with home care needs: Yes   Would you like for me to discuss the discharge plan with any other family members/significant others, and if so, who? Yes   Financial Resources Medicare   Community Resources None   Social/Functional History   Lives With Spouse   Type of Home House   Home Layout One level   Home Access Stairs to enter with rails   Entrance Stairs - Number of Steps 4   Entrance Stairs - Rails Both   Bathroom Shower/Tub Tub/Shower unit   Bathroom Toilet Standard   Bathroom Equipment Tub transfer bench   Bathroom Accessibility Not accessible   Home Equipment Cane, quad;Walker, rolling   Receives Help From Family   ADL Assistance Independent   Homemaking Assistance Needs assistance   Meal Prep Moderate   Laundry Moderate   Vacuuming Unable to assess (comment)   Cleaning Moderate   Gardening Unable to assess (comment)   Yard Work Unable to assess (comment)   Driving Total   Shopping Moderate    Unable to assess (comment)   Other (Comment) Unable to assess (comment)   Ambulation Assistance Independent   Transfer Assistance Independent   Active  No   Patient's  Info spouse Lance Donahue   Mode of Transportation Car   Occupation Retired   Type of Occupation [employer] employee ()   Discharge Planning   Type of Residence House   Living Arrangements Spouse/Significant Other   Current Services Prior To Admission Oxygen Therapy   Potential Assistance Needed N/A   Potential Assistance Purchasing Medications No   Type

## 2024-04-12 LAB
ALBUMIN SERPL-MCNC: 3 G/DL (ref 3.4–5)
ALBUMIN/GLOB SERPL: 1.1 (ref 0.8–1.7)
ALP SERPL-CCNC: 95 U/L (ref 45–117)
ALT SERPL-CCNC: 12 U/L (ref 13–56)
ANION GAP SERPL CALC-SCNC: 6 MMOL/L (ref 3–18)
AST SERPL-CCNC: 10 U/L (ref 10–38)
BASOPHILS # BLD: 0 K/UL (ref 0–0.1)
BASOPHILS NFR BLD: 0 % (ref 0–2)
BILIRUB SERPL-MCNC: 0.9 MG/DL (ref 0.2–1)
BUN SERPL-MCNC: 74 MG/DL (ref 7–18)
BUN/CREAT SERPL: 15 (ref 12–20)
CALCIUM SERPL-MCNC: 8.6 MG/DL (ref 8.5–10.1)
CHLORIDE SERPL-SCNC: 97 MMOL/L (ref 100–111)
CO2 SERPL-SCNC: 27 MMOL/L (ref 21–32)
CREAT SERPL-MCNC: 4.96 MG/DL (ref 0.6–1.3)
DIFFERENTIAL METHOD BLD: ABNORMAL
EOSINOPHIL # BLD: 0 K/UL (ref 0–0.4)
EOSINOPHIL NFR BLD: 0 % (ref 0–5)
ERYTHROCYTE [DISTWIDTH] IN BLOOD BY AUTOMATED COUNT: 15.7 % (ref 11.6–14.5)
GLOBULIN SER CALC-MCNC: 2.8 G/DL (ref 2–4)
GLUCOSE BLD STRIP.AUTO-MCNC: 131 MG/DL (ref 70–110)
GLUCOSE BLD STRIP.AUTO-MCNC: 140 MG/DL (ref 70–110)
GLUCOSE BLD STRIP.AUTO-MCNC: 223 MG/DL (ref 70–110)
GLUCOSE BLD STRIP.AUTO-MCNC: 224 MG/DL (ref 70–110)
GLUCOSE BLD STRIP.AUTO-MCNC: 232 MG/DL (ref 70–110)
GLUCOSE SERPL-MCNC: 121 MG/DL (ref 74–99)
HCT VFR BLD AUTO: 22.4 % (ref 35–45)
HGB BLD-MCNC: 7 G/DL (ref 12–16)
IMM GRANULOCYTES # BLD AUTO: 0 K/UL (ref 0–0.04)
IMM GRANULOCYTES NFR BLD AUTO: 0 % (ref 0–0.5)
LYMPHOCYTES # BLD: 1.4 K/UL (ref 0.9–3.6)
LYMPHOCYTES NFR BLD: 11 % (ref 21–52)
MCH RBC QN AUTO: 29.5 PG (ref 24–34)
MCHC RBC AUTO-ENTMCNC: 31.3 G/DL (ref 31–37)
MCV RBC AUTO: 94.5 FL (ref 78–100)
MONOCYTES # BLD: 0.7 K/UL (ref 0.05–1.2)
MONOCYTES NFR BLD: 6 % (ref 3–10)
NEUTS BAND NFR BLD MANUAL: 4 %
NEUTS SEG # BLD: 10.3 K/UL (ref 1.8–8)
NEUTS SEG NFR BLD: 79 % (ref 40–73)
NRBC # BLD: 0.53 K/UL (ref 0–0.01)
NRBC BLD-RTO: 4.3 PER 100 WBC
PLATELET # BLD AUTO: 382 K/UL (ref 135–420)
PLATELET COMMENT: ABNORMAL
PMV BLD AUTO: 9.8 FL (ref 9.2–11.8)
POTASSIUM SERPL-SCNC: 6 MMOL/L (ref 3.5–5.5)
PROT SERPL-MCNC: 5.8 G/DL (ref 6.4–8.2)
RBC # BLD AUTO: 2.37 M/UL (ref 4.2–5.3)
RBC MORPH BLD: ABNORMAL
RBC MORPH BLD: ABNORMAL
SODIUM SERPL-SCNC: 130 MMOL/L (ref 136–145)
WBC # BLD AUTO: 12.4 K/UL (ref 4.6–13.2)

## 2024-04-12 PROCEDURE — 2580000003 HC RX 258: Performed by: INTERNAL MEDICINE

## 2024-04-12 PROCEDURE — 36415 COLL VENOUS BLD VENIPUNCTURE: CPT

## 2024-04-12 PROCEDURE — 6360000002 HC RX W HCPCS: Performed by: INTERNAL MEDICINE

## 2024-04-12 PROCEDURE — 80053 COMPREHEN METABOLIC PANEL: CPT

## 2024-04-12 PROCEDURE — 1100000003 HC PRIVATE W/ TELEMETRY

## 2024-04-12 PROCEDURE — 94669 MECHANICAL CHEST WALL OSCILL: CPT

## 2024-04-12 PROCEDURE — 94660 CPAP INITIATION&MGMT: CPT

## 2024-04-12 PROCEDURE — 82962 GLUCOSE BLOOD TEST: CPT

## 2024-04-12 PROCEDURE — 6360000002 HC RX W HCPCS

## 2024-04-12 PROCEDURE — 99232 SBSQ HOSP IP/OBS MODERATE 35: CPT | Performed by: INTERNAL MEDICINE

## 2024-04-12 PROCEDURE — 6370000000 HC RX 637 (ALT 250 FOR IP): Performed by: STUDENT IN AN ORGANIZED HEALTH CARE EDUCATION/TRAINING PROGRAM

## 2024-04-12 PROCEDURE — 6370000000 HC RX 637 (ALT 250 FOR IP): Performed by: INTERNAL MEDICINE

## 2024-04-12 PROCEDURE — 2580000003 HC RX 258

## 2024-04-12 PROCEDURE — 90935 HEMODIALYSIS ONE EVALUATION: CPT

## 2024-04-12 PROCEDURE — APPSS15 APP SPLIT SHARED TIME 0-15 MINUTES

## 2024-04-12 PROCEDURE — 2700000000 HC OXYGEN THERAPY PER DAY

## 2024-04-12 PROCEDURE — 94640 AIRWAY INHALATION TREATMENT: CPT

## 2024-04-12 PROCEDURE — 2580000003 HC RX 258: Performed by: STUDENT IN AN ORGANIZED HEALTH CARE EDUCATION/TRAINING PROGRAM

## 2024-04-12 PROCEDURE — 94761 N-INVAS EAR/PLS OXIMETRY MLT: CPT

## 2024-04-12 PROCEDURE — 85025 COMPLETE CBC W/AUTO DIFF WBC: CPT

## 2024-04-12 PROCEDURE — 6370000000 HC RX 637 (ALT 250 FOR IP)

## 2024-04-12 RX ORDER — HEPARIN SODIUM 5000 [USP'U]/ML
5000 INJECTION, SOLUTION INTRAVENOUS; SUBCUTANEOUS EVERY 8 HOURS SCHEDULED
Status: DISCONTINUED | OUTPATIENT
Start: 2024-04-12 | End: 2024-04-29

## 2024-04-12 RX ADMIN — SODIUM ZIRCONIUM CYCLOSILICATE 10 G: 10 POWDER, FOR SUSPENSION ORAL at 13:08

## 2024-04-12 RX ADMIN — IPRATROPIUM BROMIDE AND ALBUTEROL SULFATE 1 DOSE: .5; 3 SOLUTION RESPIRATORY (INHALATION) at 20:14

## 2024-04-12 RX ADMIN — ACETYLCYSTEINE 400 MG: 100 SOLUTION ORAL; RESPIRATORY (INHALATION) at 07:57

## 2024-04-12 RX ADMIN — POLYETHYLENE GLYCOL 3350 17 G: 17 POWDER, FOR SOLUTION ORAL at 16:42

## 2024-04-12 RX ADMIN — GABAPENTIN 300 MG: 300 CAPSULE ORAL at 18:05

## 2024-04-12 RX ADMIN — GUAIFENESIN 600 MG: 600 TABLET, EXTENDED RELEASE ORAL at 08:44

## 2024-04-12 RX ADMIN — SEVELAMER CARBONATE 800 MG: 800 TABLET, FILM COATED ORAL at 08:44

## 2024-04-12 RX ADMIN — ASPIRIN 81 MG CHEWABLE TABLET 81 MG: 81 TABLET CHEWABLE at 08:43

## 2024-04-12 RX ADMIN — PANTOPRAZOLE SODIUM 40 MG: 40 TABLET, DELAYED RELEASE ORAL at 12:56

## 2024-04-12 RX ADMIN — ATORVASTATIN CALCIUM 80 MG: 40 TABLET, FILM COATED ORAL at 22:30

## 2024-04-12 RX ADMIN — SODIUM CHLORIDE, PRESERVATIVE FREE 10 ML: 5 INJECTION INTRAVENOUS at 08:48

## 2024-04-12 RX ADMIN — HEPARIN SODIUM 5000 UNITS: 5000 INJECTION INTRAVENOUS; SUBCUTANEOUS at 22:35

## 2024-04-12 RX ADMIN — IPRATROPIUM BROMIDE AND ALBUTEROL SULFATE 1 DOSE: .5; 3 SOLUTION RESPIRATORY (INHALATION) at 07:56

## 2024-04-12 RX ADMIN — ARFORMOTEROL TARTRATE 15 MCG: 15 SOLUTION RESPIRATORY (INHALATION) at 07:57

## 2024-04-12 RX ADMIN — OXYCODONE AND ACETAMINOPHEN 1 TABLET: 5; 325 TABLET ORAL at 16:41

## 2024-04-12 RX ADMIN — BUDESONIDE 1 MG: 1 SUSPENSION RESPIRATORY (INHALATION) at 20:14

## 2024-04-12 RX ADMIN — BUDESONIDE 1 MG: 1 SUSPENSION RESPIRATORY (INHALATION) at 07:56

## 2024-04-12 RX ADMIN — CYANOCOBALAMIN TAB 1000 MCG 1000 MCG: 1000 TAB at 08:44

## 2024-04-12 RX ADMIN — SEVELAMER CARBONATE 800 MG: 800 TABLET, FILM COATED ORAL at 16:41

## 2024-04-12 RX ADMIN — INSULIN GLARGINE 15 UNITS: 100 INJECTION, SOLUTION SUBCUTANEOUS at 22:33

## 2024-04-12 RX ADMIN — FOLIC ACID 1 MG: 1 TABLET ORAL at 08:44

## 2024-04-12 RX ADMIN — SODIUM CHLORIDE, PRESERVATIVE FREE 20 ML: 5 INJECTION INTRAVENOUS at 22:36

## 2024-04-12 RX ADMIN — WATER 40 MG: 1 INJECTION INTRAMUSCULAR; INTRAVENOUS; SUBCUTANEOUS at 12:57

## 2024-04-12 RX ADMIN — GUAIFENESIN 600 MG: 600 TABLET, EXTENDED RELEASE ORAL at 22:35

## 2024-04-12 RX ADMIN — INSULIN LISPRO 2 UNITS: 100 INJECTION, SOLUTION INTRAVENOUS; SUBCUTANEOUS at 16:42

## 2024-04-12 RX ADMIN — WATER 40 MG: 1 INJECTION INTRAMUSCULAR; INTRAVENOUS; SUBCUTANEOUS at 05:31

## 2024-04-12 RX ADMIN — MIDODRINE HYDROCHLORIDE 10 MG: 10 TABLET ORAL at 08:42

## 2024-04-12 RX ADMIN — ALLOPURINOL 100 MG: 100 TABLET ORAL at 22:31

## 2024-04-12 RX ADMIN — BENZONATATE 100 MG: 100 CAPSULE ORAL at 12:57

## 2024-04-12 RX ADMIN — SEVELAMER CARBONATE 800 MG: 800 TABLET, FILM COATED ORAL at 12:56

## 2024-04-12 RX ADMIN — CHOLECALCIFEROL TAB 25 MCG (1000 UNIT) 2000 UNITS: 25 TAB at 13:08

## 2024-04-12 RX ADMIN — HYDROCODONE POLISTIREX AND CHLORPHENIRAMINE POLISTIREX 5 ML: 10; 8 SUSPENSION, EXTENDED RELEASE ORAL at 08:44

## 2024-04-12 RX ADMIN — EZETIMIBE 10 MG: 10 TABLET ORAL at 13:08

## 2024-04-12 RX ADMIN — ARFORMOTEROL TARTRATE 15 MCG: 15 SOLUTION RESPIRATORY (INHALATION) at 20:14

## 2024-04-12 ASSESSMENT — PAIN - FUNCTIONAL ASSESSMENT
PAIN_FUNCTIONAL_ASSESSMENT: ACTIVITIES ARE NOT PREVENTED
PAIN_FUNCTIONAL_ASSESSMENT: PREVENTS OR INTERFERES SOME ACTIVE ACTIVITIES AND ADLS
PAIN_FUNCTIONAL_ASSESSMENT: PREVENTS OR INTERFERES SOME ACTIVE ACTIVITIES AND ADLS

## 2024-04-12 ASSESSMENT — PAIN DESCRIPTION - ONSET
ONSET: ON-GOING

## 2024-04-12 ASSESSMENT — PAIN DESCRIPTION - DESCRIPTORS
DESCRIPTORS: STABBING

## 2024-04-12 ASSESSMENT — PAIN DESCRIPTION - ORIENTATION
ORIENTATION: MID
ORIENTATION: POSTERIOR

## 2024-04-12 ASSESSMENT — PAIN SCALES - GENERAL
PAINLEVEL_OUTOF10: 10

## 2024-04-12 ASSESSMENT — PAIN DESCRIPTION - LOCATION
LOCATION: BACK
LOCATION: BACK
LOCATION: ABDOMEN

## 2024-04-12 ASSESSMENT — PAIN DESCRIPTION - FREQUENCY
FREQUENCY: CONTINUOUS

## 2024-04-12 ASSESSMENT — PAIN DESCRIPTION - PAIN TYPE
TYPE: ACUTE PAIN
TYPE: SURGICAL PAIN

## 2024-04-12 NOTE — DIALYSIS
HD Care plan  Time: 3  hrs  Dialysate:  2 K+   2.5 Ca++  Bath  Net UF: 2 L  Access: Aseptically care for MELL AVF  Hemodynamic stability: Maintain BP WNL     Pre Dialysis:  Pt received from Unit Nurse Divya Kilgore , pt on a bed, A+O X 4, no s/s of acute distress noted. MELL AVF assessed, no abnormalities noted, bruit and thrill strong, AVF accessed using 15 G needles without any difficulty. Good flows achieved from both needles.     Intra Dialysis:  Time out / safety process performed per policy, Tx initiated at 0907.    AVF flowing with ease. For hemodynamic stability UF goal set at 2000 ml as tolerated.  Pt offered assistance with repositioning every 2 hours/prn    Vascular access visible and line connections remained intact throughout entire duration of treatment.   Vital signs checked every 15 mins. WNL       Post Dialysis:  Tx completed at 1208,   Tolerated well , 2 L removed. De-accessed per protocol.    Clot time 5 minutes for arterial, and 5 minutes for venous.   Dry dressings applied.  Bruit/Thrill present above and below dressings.  Post Dialysis report given to Unit Nurse Divya

## 2024-04-13 ENCOUNTER — APPOINTMENT (OUTPATIENT)
Facility: HOSPITAL | Age: 74
DRG: 208 | End: 2024-04-13
Payer: MEDICARE

## 2024-04-13 LAB
ALBUMIN SERPL-MCNC: 3.1 G/DL (ref 3.4–5)
ALBUMIN/GLOB SERPL: 1.1 (ref 0.8–1.7)
ALP SERPL-CCNC: 109 U/L (ref 45–117)
ALT SERPL-CCNC: 13 U/L (ref 13–56)
ANION GAP SERPL CALC-SCNC: 8 MMOL/L (ref 3–18)
AST SERPL-CCNC: 10 U/L (ref 10–38)
BASOPHILS # BLD: 0 K/UL (ref 0–0.1)
BASOPHILS NFR BLD: 0 % (ref 0–2)
BILIRUB SERPL-MCNC: 0.6 MG/DL (ref 0.2–1)
BUN SERPL-MCNC: 58 MG/DL (ref 7–18)
BUN/CREAT SERPL: 15 (ref 12–20)
CALCIUM SERPL-MCNC: 9 MG/DL (ref 8.5–10.1)
CHLORIDE SERPL-SCNC: 98 MMOL/L (ref 100–111)
CO2 SERPL-SCNC: 27 MMOL/L (ref 21–32)
CREAT SERPL-MCNC: 3.76 MG/DL (ref 0.6–1.3)
DIFFERENTIAL METHOD BLD: ABNORMAL
EOSINOPHIL # BLD: 0 K/UL (ref 0–0.4)
EOSINOPHIL NFR BLD: 0 % (ref 0–5)
ERYTHROCYTE [DISTWIDTH] IN BLOOD BY AUTOMATED COUNT: 15.6 % (ref 11.6–14.5)
GLOBULIN SER CALC-MCNC: 2.8 G/DL (ref 2–4)
GLUCOSE BLD STRIP.AUTO-MCNC: 114 MG/DL (ref 70–110)
GLUCOSE BLD STRIP.AUTO-MCNC: 216 MG/DL (ref 70–110)
GLUCOSE BLD STRIP.AUTO-MCNC: 301 MG/DL (ref 70–110)
GLUCOSE SERPL-MCNC: 144 MG/DL (ref 74–99)
HCT VFR BLD AUTO: 24.1 % (ref 35–45)
HGB BLD-MCNC: 7.6 G/DL (ref 12–16)
IMM GRANULOCYTES # BLD AUTO: 0 K/UL (ref 0–0.04)
IMM GRANULOCYTES NFR BLD AUTO: 0 % (ref 0–0.5)
LYMPHOCYTES # BLD: 0.7 K/UL (ref 0.9–3.6)
LYMPHOCYTES NFR BLD: 6 % (ref 21–52)
MCH RBC QN AUTO: 29.8 PG (ref 24–34)
MCHC RBC AUTO-ENTMCNC: 31.5 G/DL (ref 31–37)
MCV RBC AUTO: 94.5 FL (ref 78–100)
MONOCYTES # BLD: 0.5 K/UL (ref 0.05–1.2)
MONOCYTES NFR BLD: 4 % (ref 3–10)
NEUTS SEG # BLD: 11.2 K/UL (ref 1.8–8)
NEUTS SEG NFR BLD: 90 % (ref 40–73)
NRBC # BLD: 0.67 K/UL (ref 0–0.01)
NRBC BLD-RTO: 5.4 PER 100 WBC
PLATELET # BLD AUTO: 390 K/UL (ref 135–420)
PLATELET COMMENT: ABNORMAL
PMV BLD AUTO: 9.7 FL (ref 9.2–11.8)
POTASSIUM SERPL-SCNC: 5.2 MMOL/L (ref 3.5–5.5)
PROT SERPL-MCNC: 5.9 G/DL (ref 6.4–8.2)
RBC # BLD AUTO: 2.55 M/UL (ref 4.2–5.3)
RBC MORPH BLD: ABNORMAL
RBC MORPH BLD: ABNORMAL
SODIUM SERPL-SCNC: 133 MMOL/L (ref 136–145)
WBC # BLD AUTO: 12.4 K/UL (ref 4.6–13.2)

## 2024-04-13 PROCEDURE — 6370000000 HC RX 637 (ALT 250 FOR IP): Performed by: STUDENT IN AN ORGANIZED HEALTH CARE EDUCATION/TRAINING PROGRAM

## 2024-04-13 PROCEDURE — 90935 HEMODIALYSIS ONE EVALUATION: CPT

## 2024-04-13 PROCEDURE — 2580000003 HC RX 258

## 2024-04-13 PROCEDURE — 6360000002 HC RX W HCPCS: Performed by: INTERNAL MEDICINE

## 2024-04-13 PROCEDURE — 85025 COMPLETE CBC W/AUTO DIFF WBC: CPT

## 2024-04-13 PROCEDURE — 6370000000 HC RX 637 (ALT 250 FOR IP)

## 2024-04-13 PROCEDURE — 36415 COLL VENOUS BLD VENIPUNCTURE: CPT

## 2024-04-13 PROCEDURE — 99232 SBSQ HOSP IP/OBS MODERATE 35: CPT | Performed by: INTERNAL MEDICINE

## 2024-04-13 PROCEDURE — 1100000003 HC PRIVATE W/ TELEMETRY

## 2024-04-13 PROCEDURE — 2580000003 HC RX 258: Performed by: STUDENT IN AN ORGANIZED HEALTH CARE EDUCATION/TRAINING PROGRAM

## 2024-04-13 PROCEDURE — 94660 CPAP INITIATION&MGMT: CPT

## 2024-04-13 PROCEDURE — 2700000000 HC OXYGEN THERAPY PER DAY

## 2024-04-13 PROCEDURE — 6360000002 HC RX W HCPCS

## 2024-04-13 PROCEDURE — 6370000000 HC RX 637 (ALT 250 FOR IP): Performed by: INTERNAL MEDICINE

## 2024-04-13 PROCEDURE — 80053 COMPREHEN METABOLIC PANEL: CPT

## 2024-04-13 PROCEDURE — 71045 X-RAY EXAM CHEST 1 VIEW: CPT

## 2024-04-13 PROCEDURE — 94640 AIRWAY INHALATION TREATMENT: CPT

## 2024-04-13 PROCEDURE — 87205 SMEAR GRAM STAIN: CPT

## 2024-04-13 PROCEDURE — 82962 GLUCOSE BLOOD TEST: CPT

## 2024-04-13 PROCEDURE — 94669 MECHANICAL CHEST WALL OSCILL: CPT

## 2024-04-13 PROCEDURE — 99232 SBSQ HOSP IP/OBS MODERATE 35: CPT | Performed by: EMERGENCY MEDICINE

## 2024-04-13 PROCEDURE — 87070 CULTURE OTHR SPECIMN AEROBIC: CPT

## 2024-04-13 PROCEDURE — 6370000000 HC RX 637 (ALT 250 FOR IP): Performed by: EMERGENCY MEDICINE

## 2024-04-13 RX ORDER — NEPHROCAP 1 MG
1 CAP ORAL DAILY
Status: DISCONTINUED | OUTPATIENT
Start: 2024-04-13 | End: 2024-05-07 | Stop reason: HOSPADM

## 2024-04-13 RX ADMIN — HEPARIN SODIUM 5000 UNITS: 5000 INJECTION INTRAVENOUS; SUBCUTANEOUS at 14:27

## 2024-04-13 RX ADMIN — GUAIFENESIN 600 MG: 600 TABLET, EXTENDED RELEASE ORAL at 09:06

## 2024-04-13 RX ADMIN — IPRATROPIUM BROMIDE AND ALBUTEROL SULFATE 1 DOSE: .5; 3 SOLUTION RESPIRATORY (INHALATION) at 08:04

## 2024-04-13 RX ADMIN — BENZONATATE 100 MG: 100 CAPSULE ORAL at 10:54

## 2024-04-13 RX ADMIN — HEPARIN SODIUM 5000 UNITS: 5000 INJECTION INTRAVENOUS; SUBCUTANEOUS at 21:20

## 2024-04-13 RX ADMIN — IPRATROPIUM BROMIDE AND ALBUTEROL SULFATE 1 DOSE: .5; 3 SOLUTION RESPIRATORY (INHALATION) at 19:28

## 2024-04-13 RX ADMIN — SEVELAMER CARBONATE 800 MG: 800 TABLET, FILM COATED ORAL at 09:06

## 2024-04-13 RX ADMIN — BUDESONIDE 1 MG: 1 SUSPENSION RESPIRATORY (INHALATION) at 19:28

## 2024-04-13 RX ADMIN — FOLIC ACID 1 MG: 1 TABLET ORAL at 09:06

## 2024-04-13 RX ADMIN — CHOLECALCIFEROL TAB 25 MCG (1000 UNIT) 2000 UNITS: 25 TAB at 09:06

## 2024-04-13 RX ADMIN — INSULIN LISPRO 6 UNITS: 100 INJECTION, SOLUTION INTRAVENOUS; SUBCUTANEOUS at 16:16

## 2024-04-13 RX ADMIN — SODIUM CHLORIDE, PRESERVATIVE FREE 20 ML: 5 INJECTION INTRAVENOUS at 20:02

## 2024-04-13 RX ADMIN — HYDROCODONE POLISTIREX AND CHLORPHENIRAMINE POLISTIREX 5 ML: 10; 8 SUSPENSION, EXTENDED RELEASE ORAL at 14:27

## 2024-04-13 RX ADMIN — HEPARIN SODIUM 5000 UNITS: 5000 INJECTION INTRAVENOUS; SUBCUTANEOUS at 06:14

## 2024-04-13 RX ADMIN — WATER 40 MG: 1 INJECTION INTRAMUSCULAR; INTRAVENOUS; SUBCUTANEOUS at 09:06

## 2024-04-13 RX ADMIN — ASPIRIN 81 MG CHEWABLE TABLET 81 MG: 81 TABLET CHEWABLE at 09:06

## 2024-04-13 RX ADMIN — OXYCODONE AND ACETAMINOPHEN 1 TABLET: 5; 325 TABLET ORAL at 06:24

## 2024-04-13 RX ADMIN — BENZONATATE 100 MG: 100 CAPSULE ORAL at 19:59

## 2024-04-13 RX ADMIN — BUDESONIDE 1 MG: 1 SUSPENSION RESPIRATORY (INHALATION) at 08:04

## 2024-04-13 RX ADMIN — SEVELAMER CARBONATE 800 MG: 800 TABLET, FILM COATED ORAL at 16:16

## 2024-04-13 RX ADMIN — EZETIMIBE 10 MG: 10 TABLET ORAL at 09:06

## 2024-04-13 RX ADMIN — OXYCODONE AND ACETAMINOPHEN 1 TABLET: 5; 325 TABLET ORAL at 10:54

## 2024-04-13 RX ADMIN — PANTOPRAZOLE SODIUM 40 MG: 40 TABLET, DELAYED RELEASE ORAL at 09:06

## 2024-04-13 RX ADMIN — OXYCODONE AND ACETAMINOPHEN 1 TABLET: 5; 325 TABLET ORAL at 14:27

## 2024-04-13 RX ADMIN — SODIUM CHLORIDE, PRESERVATIVE FREE 10 ML: 5 INJECTION INTRAVENOUS at 09:07

## 2024-04-13 RX ADMIN — Medication 1 MG: at 10:51

## 2024-04-13 RX ADMIN — GABAPENTIN 300 MG: 300 CAPSULE ORAL at 17:31

## 2024-04-13 RX ADMIN — OXYCODONE AND ACETAMINOPHEN 1 TABLET: 5; 325 TABLET ORAL at 19:58

## 2024-04-13 RX ADMIN — ATORVASTATIN CALCIUM 80 MG: 40 TABLET, FILM COATED ORAL at 19:59

## 2024-04-13 RX ADMIN — ARFORMOTEROL TARTRATE 15 MCG: 15 SOLUTION RESPIRATORY (INHALATION) at 08:04

## 2024-04-13 RX ADMIN — INSULIN GLARGINE 15 UNITS: 100 INJECTION, SOLUTION SUBCUTANEOUS at 21:19

## 2024-04-13 RX ADMIN — ARFORMOTEROL TARTRATE 15 MCG: 15 SOLUTION RESPIRATORY (INHALATION) at 19:28

## 2024-04-13 RX ADMIN — CYANOCOBALAMIN TAB 1000 MCG 1000 MCG: 1000 TAB at 09:06

## 2024-04-13 RX ADMIN — GUAIFENESIN 600 MG: 600 TABLET, EXTENDED RELEASE ORAL at 19:59

## 2024-04-13 ASSESSMENT — PAIN SCALES - WONG BAKER: WONGBAKER_NUMERICALRESPONSE: NO HURT

## 2024-04-13 ASSESSMENT — PAIN SCALES - GENERAL
PAINLEVEL_OUTOF10: 10
PAINLEVEL_OUTOF10: 8
PAINLEVEL_OUTOF10: 10
PAINLEVEL_OUTOF10: 4

## 2024-04-13 ASSESSMENT — PAIN DESCRIPTION - LOCATION
LOCATION: CHEST;BACK
LOCATION: BACK
LOCATION: BACK;CHEST
LOCATION: BACK

## 2024-04-13 ASSESSMENT — PAIN - FUNCTIONAL ASSESSMENT
PAIN_FUNCTIONAL_ASSESSMENT: PREVENTS OR INTERFERES SOME ACTIVE ACTIVITIES AND ADLS
PAIN_FUNCTIONAL_ASSESSMENT: PREVENTS OR INTERFERES WITH MANY ACTIVE NOT PASSIVE ACTIVITIES

## 2024-04-13 ASSESSMENT — PAIN DESCRIPTION - ORIENTATION
ORIENTATION: POSTERIOR

## 2024-04-13 ASSESSMENT — PAIN DESCRIPTION - PAIN TYPE: TYPE: ACUTE PAIN

## 2024-04-13 ASSESSMENT — PAIN DESCRIPTION - DESCRIPTORS
DESCRIPTORS: SHARP

## 2024-04-14 LAB
ANION GAP SERPL CALC-SCNC: 6 MMOL/L (ref 3–18)
BASOPHILS # BLD: 0 K/UL (ref 0–0.1)
BASOPHILS NFR BLD: 0 % (ref 0–2)
BUN SERPL-MCNC: 52 MG/DL (ref 7–18)
BUN/CREAT SERPL: 14 (ref 12–20)
CALCIUM SERPL-MCNC: 8.6 MG/DL (ref 8.5–10.1)
CHLORIDE SERPL-SCNC: 101 MMOL/L (ref 100–111)
CO2 SERPL-SCNC: 28 MMOL/L (ref 21–32)
CREAT SERPL-MCNC: 3.65 MG/DL (ref 0.6–1.3)
DIFFERENTIAL METHOD BLD: ABNORMAL
EOSINOPHIL # BLD: 0 K/UL (ref 0–0.4)
EOSINOPHIL NFR BLD: 0 % (ref 0–5)
ERYTHROCYTE [DISTWIDTH] IN BLOOD BY AUTOMATED COUNT: 15.7 % (ref 11.6–14.5)
GLUCOSE BLD STRIP.AUTO-MCNC: 140 MG/DL (ref 70–110)
GLUCOSE BLD STRIP.AUTO-MCNC: 202 MG/DL (ref 70–110)
GLUCOSE BLD STRIP.AUTO-MCNC: 243 MG/DL (ref 70–110)
GLUCOSE BLD STRIP.AUTO-MCNC: 294 MG/DL (ref 70–110)
GLUCOSE SERPL-MCNC: 135 MG/DL (ref 74–99)
HCT VFR BLD AUTO: 23.6 % (ref 35–45)
HGB BLD-MCNC: 7.5 G/DL (ref 12–16)
IMM GRANULOCYTES # BLD AUTO: 0 K/UL (ref 0–0.04)
IMM GRANULOCYTES NFR BLD AUTO: 0 % (ref 0–0.5)
LYMPHOCYTES NFR BLD: 9 % (ref 21–52)
MAGNESIUM SERPL-MCNC: 2.3 MG/DL (ref 1.6–2.6)
MCH RBC QN AUTO: 30 PG (ref 24–34)
MCHC RBC AUTO-ENTMCNC: 31.8 G/DL (ref 31–37)
MCV RBC AUTO: 94.4 FL (ref 78–100)
MONOCYTES # BLD: 0.8 K/UL (ref 0.05–1.2)
MONOCYTES NFR BLD: 7 % (ref 3–10)
NEUTS SEG # BLD: 10.1 K/UL (ref 1.8–8)
NEUTS SEG NFR BLD: 84 % (ref 40–73)
NRBC # BLD: 0.6 K/UL (ref 0–0.01)
NRBC BLD-RTO: 5 PER 100 WBC
PLATELET # BLD AUTO: 388 K/UL (ref 135–420)
PLATELET COMMENT: ABNORMAL
PMV BLD AUTO: 9.7 FL (ref 9.2–11.8)
POTASSIUM SERPL-SCNC: 4.6 MMOL/L (ref 3.5–5.5)
RBC # BLD AUTO: 2.5 M/UL (ref 4.2–5.3)
RBC MORPH BLD: ABNORMAL
RBC MORPH BLD: ABNORMAL
SODIUM SERPL-SCNC: 135 MMOL/L (ref 136–145)
WBC # BLD AUTO: 12 K/UL (ref 4.6–13.2)
WBC MORPH BLD: ABNORMAL

## 2024-04-14 PROCEDURE — 2580000003 HC RX 258: Performed by: STUDENT IN AN ORGANIZED HEALTH CARE EDUCATION/TRAINING PROGRAM

## 2024-04-14 PROCEDURE — 6360000002 HC RX W HCPCS: Performed by: INTERNAL MEDICINE

## 2024-04-14 PROCEDURE — 94761 N-INVAS EAR/PLS OXIMETRY MLT: CPT

## 2024-04-14 PROCEDURE — 6370000000 HC RX 637 (ALT 250 FOR IP): Performed by: EMERGENCY MEDICINE

## 2024-04-14 PROCEDURE — 6370000000 HC RX 637 (ALT 250 FOR IP): Performed by: STUDENT IN AN ORGANIZED HEALTH CARE EDUCATION/TRAINING PROGRAM

## 2024-04-14 PROCEDURE — 99232 SBSQ HOSP IP/OBS MODERATE 35: CPT | Performed by: EMERGENCY MEDICINE

## 2024-04-14 PROCEDURE — 6360000002 HC RX W HCPCS

## 2024-04-14 PROCEDURE — 82962 GLUCOSE BLOOD TEST: CPT

## 2024-04-14 PROCEDURE — 36415 COLL VENOUS BLD VENIPUNCTURE: CPT

## 2024-04-14 PROCEDURE — 83735 ASSAY OF MAGNESIUM: CPT

## 2024-04-14 PROCEDURE — 99232 SBSQ HOSP IP/OBS MODERATE 35: CPT | Performed by: INTERNAL MEDICINE

## 2024-04-14 PROCEDURE — 94660 CPAP INITIATION&MGMT: CPT

## 2024-04-14 PROCEDURE — 1100000003 HC PRIVATE W/ TELEMETRY

## 2024-04-14 PROCEDURE — 2580000003 HC RX 258

## 2024-04-14 PROCEDURE — 2580000003 HC RX 258: Performed by: INTERNAL MEDICINE

## 2024-04-14 PROCEDURE — 85025 COMPLETE CBC W/AUTO DIFF WBC: CPT

## 2024-04-14 PROCEDURE — 94640 AIRWAY INHALATION TREATMENT: CPT

## 2024-04-14 PROCEDURE — 80048 BASIC METABOLIC PNL TOTAL CA: CPT

## 2024-04-14 PROCEDURE — 6370000000 HC RX 637 (ALT 250 FOR IP): Performed by: INTERNAL MEDICINE

## 2024-04-14 PROCEDURE — 94669 MECHANICAL CHEST WALL OSCILL: CPT

## 2024-04-14 RX ORDER — BENZONATATE 100 MG/1
100 CAPSULE ORAL 3 TIMES DAILY
Status: DISCONTINUED | OUTPATIENT
Start: 2024-04-14 | End: 2024-05-07 | Stop reason: HOSPADM

## 2024-04-14 RX ADMIN — WATER 40 MG: 1 INJECTION INTRAMUSCULAR; INTRAVENOUS; SUBCUTANEOUS at 20:20

## 2024-04-14 RX ADMIN — AZITHROMYCIN DIHYDRATE 500 MG: 500 INJECTION, POWDER, LYOPHILIZED, FOR SOLUTION INTRAVENOUS at 16:04

## 2024-04-14 RX ADMIN — CHOLECALCIFEROL TAB 25 MCG (1000 UNIT) 2000 UNITS: 25 TAB at 08:51

## 2024-04-14 RX ADMIN — BUDESONIDE 1 MG: 1 SUSPENSION RESPIRATORY (INHALATION) at 07:45

## 2024-04-14 RX ADMIN — OXYCODONE AND ACETAMINOPHEN 1 TABLET: 5; 325 TABLET ORAL at 08:51

## 2024-04-14 RX ADMIN — SODIUM CHLORIDE, PRESERVATIVE FREE 10 ML: 5 INJECTION INTRAVENOUS at 08:54

## 2024-04-14 RX ADMIN — SEVELAMER CARBONATE 800 MG: 800 TABLET, FILM COATED ORAL at 16:49

## 2024-04-14 RX ADMIN — GUAIFENESIN 600 MG: 600 TABLET, EXTENDED RELEASE ORAL at 08:52

## 2024-04-14 RX ADMIN — HEPARIN SODIUM 5000 UNITS: 5000 INJECTION INTRAVENOUS; SUBCUTANEOUS at 13:47

## 2024-04-14 RX ADMIN — IPRATROPIUM BROMIDE AND ALBUTEROL SULFATE 1 DOSE: .5; 3 SOLUTION RESPIRATORY (INHALATION) at 14:01

## 2024-04-14 RX ADMIN — IPRATROPIUM BROMIDE AND ALBUTEROL SULFATE 1 DOSE: .5; 3 SOLUTION RESPIRATORY (INHALATION) at 07:45

## 2024-04-14 RX ADMIN — GABAPENTIN 300 MG: 300 CAPSULE ORAL at 17:47

## 2024-04-14 RX ADMIN — HEPARIN SODIUM 5000 UNITS: 5000 INJECTION INTRAVENOUS; SUBCUTANEOUS at 06:38

## 2024-04-14 RX ADMIN — SEVELAMER CARBONATE 800 MG: 800 TABLET, FILM COATED ORAL at 12:01

## 2024-04-14 RX ADMIN — ATORVASTATIN CALCIUM 80 MG: 40 TABLET, FILM COATED ORAL at 20:20

## 2024-04-14 RX ADMIN — BENZONATATE 100 MG: 100 CAPSULE ORAL at 08:51

## 2024-04-14 RX ADMIN — GUAIFENESIN 600 MG: 600 TABLET, EXTENDED RELEASE ORAL at 20:20

## 2024-04-14 RX ADMIN — SEVELAMER CARBONATE 800 MG: 800 TABLET, FILM COATED ORAL at 08:51

## 2024-04-14 RX ADMIN — EZETIMIBE 10 MG: 10 TABLET ORAL at 08:52

## 2024-04-14 RX ADMIN — Medication 1 MG: at 08:53

## 2024-04-14 RX ADMIN — BENZONATATE 100 MG: 100 CAPSULE ORAL at 20:20

## 2024-04-14 RX ADMIN — INSULIN GLARGINE 15 UNITS: 100 INJECTION, SOLUTION SUBCUTANEOUS at 20:21

## 2024-04-14 RX ADMIN — HEPARIN SODIUM 5000 UNITS: 5000 INJECTION INTRAVENOUS; SUBCUTANEOUS at 20:20

## 2024-04-14 RX ADMIN — ASPIRIN 81 MG CHEWABLE TABLET 81 MG: 81 TABLET CHEWABLE at 08:51

## 2024-04-14 RX ADMIN — CYANOCOBALAMIN TAB 1000 MCG 1000 MCG: 1000 TAB at 08:51

## 2024-04-14 RX ADMIN — OXYCODONE AND ACETAMINOPHEN 1 TABLET: 5; 325 TABLET ORAL at 20:20

## 2024-04-14 RX ADMIN — SODIUM CHLORIDE, PRESERVATIVE FREE 10 ML: 5 INJECTION INTRAVENOUS at 20:26

## 2024-04-14 RX ADMIN — WATER 40 MG: 1 INJECTION INTRAMUSCULAR; INTRAVENOUS; SUBCUTANEOUS at 08:53

## 2024-04-14 RX ADMIN — FOLIC ACID 1 MG: 1 TABLET ORAL at 08:51

## 2024-04-14 RX ADMIN — INSULIN LISPRO 4 UNITS: 100 INJECTION, SOLUTION INTRAVENOUS; SUBCUTANEOUS at 16:49

## 2024-04-14 RX ADMIN — ARFORMOTEROL TARTRATE 15 MCG: 15 SOLUTION RESPIRATORY (INHALATION) at 07:45

## 2024-04-14 RX ADMIN — BENZONATATE 100 MG: 100 CAPSULE ORAL at 16:05

## 2024-04-14 RX ADMIN — PANTOPRAZOLE SODIUM 40 MG: 40 TABLET, DELAYED RELEASE ORAL at 08:51

## 2024-04-14 RX ADMIN — INSULIN LISPRO 2 UNITS: 100 INJECTION, SOLUTION INTRAVENOUS; SUBCUTANEOUS at 12:04

## 2024-04-14 ASSESSMENT — PAIN DESCRIPTION - LOCATION
LOCATION: ABDOMEN;CHEST;BACK
LOCATION: BACK

## 2024-04-14 ASSESSMENT — PAIN SCALES - GENERAL
PAINLEVEL_OUTOF10: 8
PAINLEVEL_OUTOF10: 10
PAINLEVEL_OUTOF10: 4

## 2024-04-14 ASSESSMENT — PAIN DESCRIPTION - ORIENTATION: ORIENTATION: MID

## 2024-04-14 ASSESSMENT — PAIN DESCRIPTION - DESCRIPTORS
DESCRIPTORS: SHARP
DESCRIPTORS: ACHING

## 2024-04-14 ASSESSMENT — PAIN - FUNCTIONAL ASSESSMENT: PAIN_FUNCTIONAL_ASSESSMENT: PREVENTS OR INTERFERES WITH ALL ACTIVE AND SOME PASSIVE ACTIVITIES

## 2024-04-15 LAB
ANION GAP SERPL CALC-SCNC: 4 MMOL/L (ref 3–18)
BACTERIA SPEC CULT: NORMAL
BASOPHILS # BLD: 0 K/UL (ref 0–0.1)
BASOPHILS NFR BLD: 0 % (ref 0–2)
BUN SERPL-MCNC: 85 MG/DL (ref 7–18)
BUN/CREAT SERPL: 22 (ref 12–20)
CALCIUM SERPL-MCNC: 8.6 MG/DL (ref 8.5–10.1)
CHLORIDE SERPL-SCNC: 102 MMOL/L (ref 100–111)
CO2 SERPL-SCNC: 27 MMOL/L (ref 21–32)
CREAT SERPL-MCNC: 3.78 MG/DL (ref 0.6–1.3)
DIFFERENTIAL METHOD BLD: ABNORMAL
EOSINOPHIL # BLD: 0 K/UL (ref 0–0.4)
EOSINOPHIL NFR BLD: 0 % (ref 0–5)
ERYTHROCYTE [DISTWIDTH] IN BLOOD BY AUTOMATED COUNT: 15.7 % (ref 11.6–14.5)
FERRITIN SERPL-MCNC: 175 NG/ML (ref 8–388)
GLUCOSE BLD STRIP.AUTO-MCNC: 130 MG/DL (ref 70–110)
GLUCOSE BLD STRIP.AUTO-MCNC: 157 MG/DL (ref 70–110)
GLUCOSE BLD STRIP.AUTO-MCNC: 200 MG/DL (ref 70–110)
GLUCOSE SERPL-MCNC: 182 MG/DL (ref 74–99)
GRAM STN SPEC: NORMAL
HCT VFR BLD AUTO: 23.2 % (ref 35–45)
HGB BLD-MCNC: 7.3 G/DL (ref 12–16)
IMM GRANULOCYTES # BLD AUTO: 0 K/UL
IMM GRANULOCYTES NFR BLD AUTO: 0 %
IRON SATN MFR SERPL: 16 % (ref 20–50)
IRON SERPL-MCNC: 44 UG/DL (ref 50–175)
LYMPHOCYTES # BLD: 1.3 K/UL (ref 0.9–3.6)
LYMPHOCYTES NFR BLD: 11 % (ref 21–52)
MCH RBC QN AUTO: 29.8 PG (ref 24–34)
MCHC RBC AUTO-ENTMCNC: 31.5 G/DL (ref 31–37)
MCV RBC AUTO: 94.7 FL (ref 78–100)
MONOCYTES # BLD: 0.7 K/UL (ref 0.05–1.2)
MONOCYTES NFR BLD: 6 % (ref 3–10)
NEUTS SEG # BLD: 9.5 K/UL (ref 1.8–8)
NEUTS SEG NFR BLD: 83 % (ref 40–73)
NRBC # BLD: 0.28 K/UL (ref 0–0.01)
NRBC BLD-RTO: 2.4 PER 100 WBC
PLATELET # BLD AUTO: 422 K/UL (ref 135–420)
PLATELET COMMENT: ABNORMAL
PMV BLD AUTO: 9.9 FL (ref 9.2–11.8)
POTASSIUM SERPL-SCNC: 5.7 MMOL/L (ref 3.5–5.5)
RBC # BLD AUTO: 2.45 M/UL (ref 4.2–5.3)
RBC MORPH BLD: ABNORMAL
RBC MORPH BLD: ABNORMAL
SERVICE CMNT-IMP: NORMAL
SODIUM SERPL-SCNC: 133 MMOL/L (ref 136–145)
TIBC SERPL-MCNC: 281 UG/DL (ref 250–450)
WBC # BLD AUTO: 11.5 K/UL (ref 4.6–13.2)

## 2024-04-15 PROCEDURE — 6370000000 HC RX 637 (ALT 250 FOR IP): Performed by: STUDENT IN AN ORGANIZED HEALTH CARE EDUCATION/TRAINING PROGRAM

## 2024-04-15 PROCEDURE — 97535 SELF CARE MNGMENT TRAINING: CPT

## 2024-04-15 PROCEDURE — 6370000000 HC RX 637 (ALT 250 FOR IP): Performed by: INTERNAL MEDICINE

## 2024-04-15 PROCEDURE — 85025 COMPLETE CBC W/AUTO DIFF WBC: CPT

## 2024-04-15 PROCEDURE — 2580000003 HC RX 258: Performed by: STUDENT IN AN ORGANIZED HEALTH CARE EDUCATION/TRAINING PROGRAM

## 2024-04-15 PROCEDURE — 6360000002 HC RX W HCPCS: Performed by: INTERNAL MEDICINE

## 2024-04-15 PROCEDURE — 2580000003 HC RX 258: Performed by: INTERNAL MEDICINE

## 2024-04-15 PROCEDURE — 94761 N-INVAS EAR/PLS OXIMETRY MLT: CPT

## 2024-04-15 PROCEDURE — 6370000000 HC RX 637 (ALT 250 FOR IP)

## 2024-04-15 PROCEDURE — 97116 GAIT TRAINING THERAPY: CPT

## 2024-04-15 PROCEDURE — 94669 MECHANICAL CHEST WALL OSCILL: CPT

## 2024-04-15 PROCEDURE — 80048 BASIC METABOLIC PNL TOTAL CA: CPT

## 2024-04-15 PROCEDURE — 82962 GLUCOSE BLOOD TEST: CPT

## 2024-04-15 PROCEDURE — 94640 AIRWAY INHALATION TREATMENT: CPT

## 2024-04-15 PROCEDURE — 97530 THERAPEUTIC ACTIVITIES: CPT

## 2024-04-15 PROCEDURE — 82728 ASSAY OF FERRITIN: CPT

## 2024-04-15 PROCEDURE — 1100000003 HC PRIVATE W/ TELEMETRY

## 2024-04-15 PROCEDURE — 83540 ASSAY OF IRON: CPT

## 2024-04-15 PROCEDURE — 97164 PT RE-EVAL EST PLAN CARE: CPT

## 2024-04-15 PROCEDURE — APPSS15 APP SPLIT SHARED TIME 0-15 MINUTES

## 2024-04-15 PROCEDURE — 36415 COLL VENOUS BLD VENIPUNCTURE: CPT

## 2024-04-15 PROCEDURE — 6370000000 HC RX 637 (ALT 250 FOR IP): Performed by: EMERGENCY MEDICINE

## 2024-04-15 PROCEDURE — 83550 IRON BINDING TEST: CPT

## 2024-04-15 PROCEDURE — 99232 SBSQ HOSP IP/OBS MODERATE 35: CPT | Performed by: HOSPITALIST

## 2024-04-15 PROCEDURE — 90935 HEMODIALYSIS ONE EVALUATION: CPT

## 2024-04-15 RX ADMIN — HYDROCODONE POLISTIREX AND CHLORPHENIRAMINE POLISTIREX 5 ML: 10; 8 SUSPENSION, EXTENDED RELEASE ORAL at 21:39

## 2024-04-15 RX ADMIN — IPRATROPIUM BROMIDE AND ALBUTEROL SULFATE 1 DOSE: .5; 3 SOLUTION RESPIRATORY (INHALATION) at 20:02

## 2024-04-15 RX ADMIN — WATER 40 MG: 1 INJECTION INTRAMUSCULAR; INTRAVENOUS; SUBCUTANEOUS at 21:22

## 2024-04-15 RX ADMIN — GABAPENTIN 300 MG: 300 CAPSULE ORAL at 17:30

## 2024-04-15 RX ADMIN — GUAIFENESIN 600 MG: 600 TABLET, EXTENDED RELEASE ORAL at 21:20

## 2024-04-15 RX ADMIN — HEPARIN SODIUM 5000 UNITS: 5000 INJECTION INTRAVENOUS; SUBCUTANEOUS at 17:23

## 2024-04-15 RX ADMIN — Medication 1 MG: at 08:34

## 2024-04-15 RX ADMIN — SODIUM CHLORIDE, PRESERVATIVE FREE 10 ML: 5 INJECTION INTRAVENOUS at 21:25

## 2024-04-15 RX ADMIN — ATORVASTATIN CALCIUM 80 MG: 40 TABLET, FILM COATED ORAL at 21:20

## 2024-04-15 RX ADMIN — CHOLECALCIFEROL TAB 25 MCG (1000 UNIT) 2000 UNITS: 25 TAB at 08:34

## 2024-04-15 RX ADMIN — SEVELAMER CARBONATE 800 MG: 800 TABLET, FILM COATED ORAL at 08:34

## 2024-04-15 RX ADMIN — OXYCODONE AND ACETAMINOPHEN 1 TABLET: 5; 325 TABLET ORAL at 08:33

## 2024-04-15 RX ADMIN — GUAIFENESIN 600 MG: 600 TABLET, EXTENDED RELEASE ORAL at 08:34

## 2024-04-15 RX ADMIN — WATER 40 MG: 1 INJECTION INTRAMUSCULAR; INTRAVENOUS; SUBCUTANEOUS at 08:33

## 2024-04-15 RX ADMIN — OXYCODONE AND ACETAMINOPHEN 1 TABLET: 5; 325 TABLET ORAL at 21:21

## 2024-04-15 RX ADMIN — AZITHROMYCIN DIHYDRATE 500 MG: 500 INJECTION, POWDER, LYOPHILIZED, FOR SOLUTION INTRAVENOUS at 16:34

## 2024-04-15 RX ADMIN — ALLOPURINOL 100 MG: 100 TABLET ORAL at 21:19

## 2024-04-15 RX ADMIN — BUDESONIDE 1 MG: 1 SUSPENSION RESPIRATORY (INHALATION) at 20:02

## 2024-04-15 RX ADMIN — ARFORMOTEROL TARTRATE 15 MCG: 15 SOLUTION RESPIRATORY (INHALATION) at 07:25

## 2024-04-15 RX ADMIN — EPOETIN ALFA-EPBX 6000 UNITS: 3000 INJECTION, SOLUTION INTRAVENOUS; SUBCUTANEOUS at 17:42

## 2024-04-15 RX ADMIN — HEPARIN SODIUM 5000 UNITS: 5000 INJECTION INTRAVENOUS; SUBCUTANEOUS at 05:40

## 2024-04-15 RX ADMIN — PANTOPRAZOLE SODIUM 40 MG: 40 TABLET, DELAYED RELEASE ORAL at 08:34

## 2024-04-15 RX ADMIN — BENZONATATE 100 MG: 100 CAPSULE ORAL at 08:34

## 2024-04-15 RX ADMIN — HEPARIN SODIUM 5000 UNITS: 5000 INJECTION INTRAVENOUS; SUBCUTANEOUS at 21:18

## 2024-04-15 RX ADMIN — EZETIMIBE 10 MG: 10 TABLET ORAL at 08:34

## 2024-04-15 RX ADMIN — BUDESONIDE 1 MG: 1 SUSPENSION RESPIRATORY (INHALATION) at 07:25

## 2024-04-15 RX ADMIN — CYANOCOBALAMIN TAB 1000 MCG 1000 MCG: 1000 TAB at 08:34

## 2024-04-15 RX ADMIN — OXYCODONE AND ACETAMINOPHEN 1 TABLET: 5; 325 TABLET ORAL at 16:33

## 2024-04-15 RX ADMIN — INSULIN GLARGINE 15 UNITS: 100 INJECTION, SOLUTION SUBCUTANEOUS at 21:19

## 2024-04-15 RX ADMIN — BENZONATATE 100 MG: 100 CAPSULE ORAL at 21:17

## 2024-04-15 RX ADMIN — SEVELAMER CARBONATE 800 MG: 800 TABLET, FILM COATED ORAL at 16:34

## 2024-04-15 RX ADMIN — FOLIC ACID 1 MG: 1 TABLET ORAL at 08:34

## 2024-04-15 RX ADMIN — SODIUM CHLORIDE, PRESERVATIVE FREE 10 ML: 5 INJECTION INTRAVENOUS at 08:42

## 2024-04-15 RX ADMIN — IPRATROPIUM BROMIDE AND ALBUTEROL SULFATE 1 DOSE: .5; 3 SOLUTION RESPIRATORY (INHALATION) at 07:25

## 2024-04-15 RX ADMIN — ASPIRIN 81 MG CHEWABLE TABLET 81 MG: 81 TABLET CHEWABLE at 08:34

## 2024-04-15 RX ADMIN — ARFORMOTEROL TARTRATE 15 MCG: 15 SOLUTION RESPIRATORY (INHALATION) at 20:02

## 2024-04-15 ASSESSMENT — PAIN DESCRIPTION - DESCRIPTORS
DESCRIPTORS: ACHING
DESCRIPTORS: ACHING
DESCRIPTORS: ACHING;STABBING
DESCRIPTORS: ACHING;BURNING
DESCRIPTORS: ACHING

## 2024-04-15 ASSESSMENT — PAIN DESCRIPTION - ONSET
ONSET: ON-GOING
ONSET: GRADUAL
ONSET: ON-GOING

## 2024-04-15 ASSESSMENT — PAIN DESCRIPTION - ORIENTATION
ORIENTATION: ANTERIOR;POSTERIOR
ORIENTATION: RIGHT;LEFT;MID;ANTERIOR;POSTERIOR
ORIENTATION: RIGHT;LEFT;ANTERIOR;MID;POSTERIOR
ORIENTATION: RIGHT;LEFT;ANTERIOR;MID
ORIENTATION: RIGHT;LEFT;ANTERIOR;MID;POSTERIOR

## 2024-04-15 ASSESSMENT — PAIN DESCRIPTION - LOCATION
LOCATION: ABDOMEN;BACK
LOCATION: ABDOMEN;BACK
LOCATION: BACK
LOCATION: BACK;CHEST
LOCATION: ABDOMEN;BACK

## 2024-04-15 ASSESSMENT — PAIN SCALES - GENERAL
PAINLEVEL_OUTOF10: 10
PAINLEVEL_OUTOF10: 0
PAINLEVEL_OUTOF10: 10
PAINLEVEL_OUTOF10: 10
PAINLEVEL_OUTOF10: 5
PAINLEVEL_OUTOF10: 8

## 2024-04-15 ASSESSMENT — PAIN DESCRIPTION - PAIN TYPE
TYPE: CHRONIC PAIN

## 2024-04-15 ASSESSMENT — PAIN - FUNCTIONAL ASSESSMENT
PAIN_FUNCTIONAL_ASSESSMENT: PREVENTS OR INTERFERES SOME ACTIVE ACTIVITIES AND ADLS
PAIN_FUNCTIONAL_ASSESSMENT: ACTIVITIES ARE NOT PREVENTED
PAIN_FUNCTIONAL_ASSESSMENT: PREVENTS OR INTERFERES SOME ACTIVE ACTIVITIES AND ADLS

## 2024-04-15 ASSESSMENT — PAIN DESCRIPTION - FREQUENCY
FREQUENCY: CONTINUOUS
FREQUENCY: CONTINUOUS

## 2024-04-15 NOTE — DIALYSIS
HD Care plan  Time: 3  hrs  Dialysate:  2 K+   2.5 Ca++  Bath  Net UF: 2 L  Access: Aseptically care for MELL AVF  Hemodynamic stability: Maintain BP WNL     Pre Dialysis:  Pt received from Unit Nurse Preethi Browne , pt on a bed, A+O X 4, no s/s of acute distress noted. MELL AVF assessed, no abnormalities noted, bruit and thrill strong, AVF accessed using 15 G needles without any difficulty. Good flows achieved from both needles.     Intra Dialysis:  Time out / safety process performed per policy, Tx initiated at 1230    AVF flowing with ease. For hemodynamic stability UF goal set at 2000 ml as tolerated.  Pt offered assistance with repositioning every 2 hours/prn    Vascular access visible and line connections remained intact throughout entire duration of treatment.   Vital signs checked every 15 mins. WNL        Post Dialysis:  Tx completed at 1530,   Tolerated well , 2 L removed. De-accessed per protocol.    Clot time 5 minutes for arterial, and 5 minutes for venous.   Dry dressings applied.  Bruit/Thrill present above and below dressings.  Post Dialysis report given to Unit Nurse Preethi

## 2024-04-15 NOTE — CARE COORDINATION
PT stopped by case management office to informed SW that pt is upset after a conversation was had regarding potentially going to SNF facility.       SW called patient room to speak with patient, pt informed SW that she would like go to stay in the hospital. SW asked would like to be screen for potential going to ARU, pt was in agreeable. Pt state she do not want to go to SNF but would be okay with HH if necessary.      SW sent a message vis [employer] to Ms. Haily Montejo asking for pt to be screen for ARU.     Shivani Jacinto BSW  Case Management

## 2024-04-16 LAB
GLUCOSE BLD STRIP.AUTO-MCNC: 168 MG/DL (ref 70–110)
GLUCOSE BLD STRIP.AUTO-MCNC: 189 MG/DL (ref 70–110)
GLUCOSE BLD STRIP.AUTO-MCNC: 204 MG/DL (ref 70–110)
GLUCOSE BLD STRIP.AUTO-MCNC: 204 MG/DL (ref 70–110)
PROCALCITONIN SERPL-MCNC: 0.24 NG/ML

## 2024-04-16 PROCEDURE — 2580000003 HC RX 258: Performed by: INTERNAL MEDICINE

## 2024-04-16 PROCEDURE — 2580000003 HC RX 258: Performed by: STUDENT IN AN ORGANIZED HEALTH CARE EDUCATION/TRAINING PROGRAM

## 2024-04-16 PROCEDURE — 6370000000 HC RX 637 (ALT 250 FOR IP): Performed by: INTERNAL MEDICINE

## 2024-04-16 PROCEDURE — 6360000002 HC RX W HCPCS: Performed by: INTERNAL MEDICINE

## 2024-04-16 PROCEDURE — 6370000000 HC RX 637 (ALT 250 FOR IP)

## 2024-04-16 PROCEDURE — 1100000003 HC PRIVATE W/ TELEMETRY

## 2024-04-16 PROCEDURE — 94640 AIRWAY INHALATION TREATMENT: CPT

## 2024-04-16 PROCEDURE — 94669 MECHANICAL CHEST WALL OSCILL: CPT

## 2024-04-16 PROCEDURE — 6370000000 HC RX 637 (ALT 250 FOR IP): Performed by: HOSPITALIST

## 2024-04-16 PROCEDURE — 84145 PROCALCITONIN (PCT): CPT

## 2024-04-16 PROCEDURE — 99232 SBSQ HOSP IP/OBS MODERATE 35: CPT | Performed by: HOSPITALIST

## 2024-04-16 PROCEDURE — 36415 COLL VENOUS BLD VENIPUNCTURE: CPT

## 2024-04-16 PROCEDURE — 94761 N-INVAS EAR/PLS OXIMETRY MLT: CPT

## 2024-04-16 PROCEDURE — APPSS15 APP SPLIT SHARED TIME 0-15 MINUTES

## 2024-04-16 PROCEDURE — 6370000000 HC RX 637 (ALT 250 FOR IP): Performed by: EMERGENCY MEDICINE

## 2024-04-16 PROCEDURE — 6370000000 HC RX 637 (ALT 250 FOR IP): Performed by: STUDENT IN AN ORGANIZED HEALTH CARE EDUCATION/TRAINING PROGRAM

## 2024-04-16 PROCEDURE — 82962 GLUCOSE BLOOD TEST: CPT

## 2024-04-16 PROCEDURE — 99232 SBSQ HOSP IP/OBS MODERATE 35: CPT | Performed by: INTERNAL MEDICINE

## 2024-04-16 RX ORDER — BISACODYL 10 MG
10 SUPPOSITORY, RECTAL RECTAL DAILY PRN
Status: COMPLETED | OUTPATIENT
Start: 2024-04-16 | End: 2024-04-16

## 2024-04-16 RX ORDER — PREDNISONE 20 MG/1
40 TABLET ORAL DAILY
Status: DISCONTINUED | OUTPATIENT
Start: 2024-04-16 | End: 2024-04-21

## 2024-04-16 RX ORDER — BISACODYL 10 MG
10 SUPPOSITORY, RECTAL RECTAL DAILY PRN
Status: DISCONTINUED | OUTPATIENT
Start: 2024-04-16 | End: 2024-04-16

## 2024-04-16 RX ADMIN — BUDESONIDE 1 MG: 1 SUSPENSION RESPIRATORY (INHALATION) at 09:23

## 2024-04-16 RX ADMIN — CYANOCOBALAMIN TAB 1000 MCG 1000 MCG: 1000 TAB at 08:23

## 2024-04-16 RX ADMIN — SEVELAMER CARBONATE 800 MG: 800 TABLET, FILM COATED ORAL at 08:23

## 2024-04-16 RX ADMIN — GABAPENTIN 300 MG: 300 CAPSULE ORAL at 17:36

## 2024-04-16 RX ADMIN — CHOLECALCIFEROL TAB 25 MCG (1000 UNIT) 2000 UNITS: 25 TAB at 08:23

## 2024-04-16 RX ADMIN — HEPARIN SODIUM 5000 UNITS: 5000 INJECTION INTRAVENOUS; SUBCUTANEOUS at 16:48

## 2024-04-16 RX ADMIN — OXYCODONE AND ACETAMINOPHEN 1 TABLET: 5; 325 TABLET ORAL at 20:47

## 2024-04-16 RX ADMIN — BUDESONIDE 1 MG: 1 SUSPENSION RESPIRATORY (INHALATION) at 20:19

## 2024-04-16 RX ADMIN — IPRATROPIUM BROMIDE AND ALBUTEROL SULFATE 1 DOSE: .5; 3 SOLUTION RESPIRATORY (INHALATION) at 14:31

## 2024-04-16 RX ADMIN — GUAIFENESIN 600 MG: 600 TABLET, EXTENDED RELEASE ORAL at 20:47

## 2024-04-16 RX ADMIN — HEPARIN SODIUM 5000 UNITS: 5000 INJECTION INTRAVENOUS; SUBCUTANEOUS at 21:47

## 2024-04-16 RX ADMIN — FOLIC ACID 1 MG: 1 TABLET ORAL at 08:23

## 2024-04-16 RX ADMIN — BENZONATATE 100 MG: 100 CAPSULE ORAL at 08:23

## 2024-04-16 RX ADMIN — HEPARIN SODIUM 5000 UNITS: 5000 INJECTION INTRAVENOUS; SUBCUTANEOUS at 06:50

## 2024-04-16 RX ADMIN — AZITHROMYCIN DIHYDRATE 500 MG: 500 INJECTION, POWDER, LYOPHILIZED, FOR SOLUTION INTRAVENOUS at 16:46

## 2024-04-16 RX ADMIN — INSULIN GLARGINE 15 UNITS: 100 INJECTION, SOLUTION SUBCUTANEOUS at 20:48

## 2024-04-16 RX ADMIN — SODIUM CHLORIDE, PRESERVATIVE FREE 10 ML: 5 INJECTION INTRAVENOUS at 08:24

## 2024-04-16 RX ADMIN — ASPIRIN 81 MG CHEWABLE TABLET 81 MG: 81 TABLET CHEWABLE at 08:23

## 2024-04-16 RX ADMIN — SEVELAMER CARBONATE 800 MG: 800 TABLET, FILM COATED ORAL at 12:16

## 2024-04-16 RX ADMIN — SODIUM CHLORIDE, PRESERVATIVE FREE 20 ML: 5 INJECTION INTRAVENOUS at 20:50

## 2024-04-16 RX ADMIN — HYDROCODONE POLISTIREX AND CHLORPHENIRAMINE POLISTIREX 5 ML: 10; 8 SUSPENSION, EXTENDED RELEASE ORAL at 21:47

## 2024-04-16 RX ADMIN — WATER 40 MG: 1 INJECTION INTRAMUSCULAR; INTRAVENOUS; SUBCUTANEOUS at 08:24

## 2024-04-16 RX ADMIN — PREDNISONE 40 MG: 20 TABLET ORAL at 18:18

## 2024-04-16 RX ADMIN — BISACODYL 10 MG: 10 SUPPOSITORY RECTAL at 18:18

## 2024-04-16 RX ADMIN — BENZONATATE 100 MG: 100 CAPSULE ORAL at 20:49

## 2024-04-16 RX ADMIN — IPRATROPIUM BROMIDE AND ALBUTEROL SULFATE 1 DOSE: .5; 3 SOLUTION RESPIRATORY (INHALATION) at 09:23

## 2024-04-16 RX ADMIN — BENZONATATE 100 MG: 100 CAPSULE ORAL at 16:43

## 2024-04-16 RX ADMIN — INSULIN LISPRO 2 UNITS: 100 INJECTION, SOLUTION INTRAVENOUS; SUBCUTANEOUS at 12:16

## 2024-04-16 RX ADMIN — SEVELAMER CARBONATE 800 MG: 800 TABLET, FILM COATED ORAL at 16:43

## 2024-04-16 RX ADMIN — PANTOPRAZOLE SODIUM 40 MG: 40 TABLET, DELAYED RELEASE ORAL at 08:23

## 2024-04-16 RX ADMIN — GUAIFENESIN 600 MG: 600 TABLET, EXTENDED RELEASE ORAL at 08:23

## 2024-04-16 RX ADMIN — EZETIMIBE 10 MG: 10 TABLET ORAL at 09:44

## 2024-04-16 RX ADMIN — OXYCODONE AND ACETAMINOPHEN 1 TABLET: 5; 325 TABLET ORAL at 06:51

## 2024-04-16 RX ADMIN — Medication 1 MG: at 08:23

## 2024-04-16 RX ADMIN — ARFORMOTEROL TARTRATE 15 MCG: 15 SOLUTION RESPIRATORY (INHALATION) at 20:19

## 2024-04-16 RX ADMIN — ATORVASTATIN CALCIUM 80 MG: 40 TABLET, FILM COATED ORAL at 20:46

## 2024-04-16 RX ADMIN — ARFORMOTEROL TARTRATE 15 MCG: 15 SOLUTION RESPIRATORY (INHALATION) at 09:23

## 2024-04-16 RX ADMIN — IPRATROPIUM BROMIDE AND ALBUTEROL SULFATE 1 DOSE: .5; 3 SOLUTION RESPIRATORY (INHALATION) at 20:19

## 2024-04-16 ASSESSMENT — PAIN DESCRIPTION - DESCRIPTORS
DESCRIPTORS: ACHING

## 2024-04-16 ASSESSMENT — PAIN SCALES - GENERAL
PAINLEVEL_OUTOF10: 10
PAINLEVEL_OUTOF10: 9
PAINLEVEL_OUTOF10: 0

## 2024-04-16 ASSESSMENT — PAIN - FUNCTIONAL ASSESSMENT: PAIN_FUNCTIONAL_ASSESSMENT: PREVENTS OR INTERFERES SOME ACTIVE ACTIVITIES AND ADLS

## 2024-04-16 ASSESSMENT — PAIN DESCRIPTION - LOCATION
LOCATION: CHEST;BACK;RIB CAGE
LOCATION: HEAD
LOCATION: BACK;CHEST;RIB CAGE

## 2024-04-16 ASSESSMENT — PAIN DESCRIPTION - ONSET: ONSET: ON-GOING

## 2024-04-16 ASSESSMENT — PAIN DESCRIPTION - ORIENTATION
ORIENTATION: ANTERIOR;POSTERIOR

## 2024-04-16 ASSESSMENT — PAIN DESCRIPTION - PAIN TYPE: TYPE: CHRONIC PAIN

## 2024-04-16 ASSESSMENT — PAIN DESCRIPTION - FREQUENCY: FREQUENCY: CONTINUOUS

## 2024-04-17 LAB
GLUCOSE BLD STRIP.AUTO-MCNC: 176 MG/DL (ref 70–110)
GLUCOSE BLD STRIP.AUTO-MCNC: 209 MG/DL (ref 70–110)
GLUCOSE BLD STRIP.AUTO-MCNC: 264 MG/DL (ref 70–110)
GLUCOSE BLD STRIP.AUTO-MCNC: 306 MG/DL (ref 70–110)

## 2024-04-17 PROCEDURE — 97530 THERAPEUTIC ACTIVITIES: CPT

## 2024-04-17 PROCEDURE — 82962 GLUCOSE BLOOD TEST: CPT

## 2024-04-17 PROCEDURE — 6360000002 HC RX W HCPCS: Performed by: INTERNAL MEDICINE

## 2024-04-17 PROCEDURE — 94640 AIRWAY INHALATION TREATMENT: CPT

## 2024-04-17 PROCEDURE — 94669 MECHANICAL CHEST WALL OSCILL: CPT

## 2024-04-17 PROCEDURE — 6370000000 HC RX 637 (ALT 250 FOR IP): Performed by: INTERNAL MEDICINE

## 2024-04-17 PROCEDURE — 99232 SBSQ HOSP IP/OBS MODERATE 35: CPT | Performed by: HOSPITALIST

## 2024-04-17 PROCEDURE — 1100000003 HC PRIVATE W/ TELEMETRY

## 2024-04-17 PROCEDURE — 6370000000 HC RX 637 (ALT 250 FOR IP): Performed by: STUDENT IN AN ORGANIZED HEALTH CARE EDUCATION/TRAINING PROGRAM

## 2024-04-17 PROCEDURE — 2580000003 HC RX 258: Performed by: STUDENT IN AN ORGANIZED HEALTH CARE EDUCATION/TRAINING PROGRAM

## 2024-04-17 PROCEDURE — 90935 HEMODIALYSIS ONE EVALUATION: CPT

## 2024-04-17 PROCEDURE — 94660 CPAP INITIATION&MGMT: CPT

## 2024-04-17 PROCEDURE — 2580000003 HC RX 258: Performed by: INTERNAL MEDICINE

## 2024-04-17 PROCEDURE — 6370000000 HC RX 637 (ALT 250 FOR IP): Performed by: EMERGENCY MEDICINE

## 2024-04-17 PROCEDURE — 6370000000 HC RX 637 (ALT 250 FOR IP)

## 2024-04-17 RX ADMIN — HEPARIN SODIUM 5000 UNITS: 5000 INJECTION INTRAVENOUS; SUBCUTANEOUS at 13:34

## 2024-04-17 RX ADMIN — ARFORMOTEROL TARTRATE 15 MCG: 15 SOLUTION RESPIRATORY (INHALATION) at 19:58

## 2024-04-17 RX ADMIN — EPOETIN ALFA-EPBX 6000 UNITS: 3000 INJECTION, SOLUTION INTRAVENOUS; SUBCUTANEOUS at 18:16

## 2024-04-17 RX ADMIN — INSULIN LISPRO 4 UNITS: 100 INJECTION, SOLUTION INTRAVENOUS; SUBCUTANEOUS at 13:34

## 2024-04-17 RX ADMIN — ASPIRIN 81 MG CHEWABLE TABLET 81 MG: 81 TABLET CHEWABLE at 08:38

## 2024-04-17 RX ADMIN — BUDESONIDE 1 MG: 1 SUSPENSION RESPIRATORY (INHALATION) at 07:40

## 2024-04-17 RX ADMIN — BENZONATATE 100 MG: 100 CAPSULE ORAL at 21:53

## 2024-04-17 RX ADMIN — AZITHROMYCIN DIHYDRATE 500 MG: 500 INJECTION, POWDER, LYOPHILIZED, FOR SOLUTION INTRAVENOUS at 14:46

## 2024-04-17 RX ADMIN — PANTOPRAZOLE SODIUM 40 MG: 40 TABLET, DELAYED RELEASE ORAL at 08:40

## 2024-04-17 RX ADMIN — BUDESONIDE 1 MG: 1 SUSPENSION RESPIRATORY (INHALATION) at 19:57

## 2024-04-17 RX ADMIN — IPRATROPIUM BROMIDE AND ALBUTEROL SULFATE 1 DOSE: .5; 3 SOLUTION RESPIRATORY (INHALATION) at 07:39

## 2024-04-17 RX ADMIN — CYANOCOBALAMIN TAB 1000 MCG 1000 MCG: 1000 TAB at 08:39

## 2024-04-17 RX ADMIN — SEVELAMER CARBONATE 800 MG: 800 TABLET, FILM COATED ORAL at 16:16

## 2024-04-17 RX ADMIN — GUAIFENESIN 600 MG: 600 TABLET, EXTENDED RELEASE ORAL at 21:50

## 2024-04-17 RX ADMIN — BENZONATATE 100 MG: 100 CAPSULE ORAL at 13:34

## 2024-04-17 RX ADMIN — INSULIN LISPRO 4 UNITS: 100 INJECTION, SOLUTION INTRAVENOUS; SUBCUTANEOUS at 16:16

## 2024-04-17 RX ADMIN — BENZONATATE 100 MG: 100 CAPSULE ORAL at 08:39

## 2024-04-17 RX ADMIN — PREDNISONE 40 MG: 20 TABLET ORAL at 08:38

## 2024-04-17 RX ADMIN — CHOLECALCIFEROL TAB 25 MCG (1000 UNIT) 2000 UNITS: 25 TAB at 08:38

## 2024-04-17 RX ADMIN — IPRATROPIUM BROMIDE AND ALBUTEROL SULFATE 1 DOSE: .5; 3 SOLUTION RESPIRATORY (INHALATION) at 19:57

## 2024-04-17 RX ADMIN — FOLIC ACID 1 MG: 1 TABLET ORAL at 08:38

## 2024-04-17 RX ADMIN — SODIUM CHLORIDE, PRESERVATIVE FREE 10 ML: 5 INJECTION INTRAVENOUS at 08:39

## 2024-04-17 RX ADMIN — Medication 1 MG: at 08:38

## 2024-04-17 RX ADMIN — GUAIFENESIN 600 MG: 600 TABLET, EXTENDED RELEASE ORAL at 08:39

## 2024-04-17 RX ADMIN — ARFORMOTEROL TARTRATE 15 MCG: 15 SOLUTION RESPIRATORY (INHALATION) at 07:40

## 2024-04-17 RX ADMIN — GABAPENTIN 300 MG: 300 CAPSULE ORAL at 18:16

## 2024-04-17 RX ADMIN — HEPARIN SODIUM 5000 UNITS: 5000 INJECTION INTRAVENOUS; SUBCUTANEOUS at 06:37

## 2024-04-17 RX ADMIN — ALLOPURINOL 100 MG: 100 TABLET ORAL at 21:50

## 2024-04-17 RX ADMIN — OXYCODONE AND ACETAMINOPHEN 1 TABLET: 5; 325 TABLET ORAL at 21:48

## 2024-04-17 RX ADMIN — INSULIN GLARGINE 15 UNITS: 100 INJECTION, SOLUTION SUBCUTANEOUS at 21:48

## 2024-04-17 RX ADMIN — OXYCODONE AND ACETAMINOPHEN 1 TABLET: 5; 325 TABLET ORAL at 06:37

## 2024-04-17 RX ADMIN — HEPARIN SODIUM 5000 UNITS: 5000 INJECTION INTRAVENOUS; SUBCUTANEOUS at 21:48

## 2024-04-17 RX ADMIN — EZETIMIBE 10 MG: 10 TABLET ORAL at 08:38

## 2024-04-17 RX ADMIN — ATORVASTATIN CALCIUM 80 MG: 40 TABLET, FILM COATED ORAL at 21:50

## 2024-04-17 RX ADMIN — SEVELAMER CARBONATE 800 MG: 800 TABLET, FILM COATED ORAL at 08:39

## 2024-04-17 ASSESSMENT — PAIN DESCRIPTION - LOCATION
LOCATION: BACK;CHEST;RIB CAGE
LOCATION: ARM;CHEST;BACK
LOCATION: ARM

## 2024-04-17 ASSESSMENT — PAIN SCALES - GENERAL
PAINLEVEL_OUTOF10: 10
PAINLEVEL_OUTOF10: 0
PAINLEVEL_OUTOF10: 0
PAINLEVEL_OUTOF10: 10
PAINLEVEL_OUTOF10: 0
PAINLEVEL_OUTOF10: 2

## 2024-04-17 ASSESSMENT — PAIN DESCRIPTION - PAIN TYPE
TYPE: ACUTE PAIN
TYPE: CHRONIC PAIN

## 2024-04-17 ASSESSMENT — PAIN DESCRIPTION - ORIENTATION
ORIENTATION: RIGHT
ORIENTATION: ANTERIOR;POSTERIOR;RIGHT
ORIENTATION: ANTERIOR;POSTERIOR;RIGHT

## 2024-04-17 ASSESSMENT — PAIN DESCRIPTION - DESCRIPTORS
DESCRIPTORS: ACHING
DESCRIPTORS: BURNING
DESCRIPTORS: ACHING

## 2024-04-17 ASSESSMENT — PAIN - FUNCTIONAL ASSESSMENT: PAIN_FUNCTIONAL_ASSESSMENT: ACTIVITIES ARE NOT PREVENTED

## 2024-04-17 NOTE — CARE COORDINATION
04/17/24 1441   /Social Work Whiteboard Notes   /Social Work Whiteboard RED 04/17/2024 Patient is ageeable to therapy with ARU. Patient awaiting auth for ARU. Deangelo Mims BSW   Case Management

## 2024-04-18 LAB
GLUCOSE BLD STRIP.AUTO-MCNC: 121 MG/DL (ref 70–110)
GLUCOSE BLD STRIP.AUTO-MCNC: 133 MG/DL (ref 70–110)
GLUCOSE BLD STRIP.AUTO-MCNC: 221 MG/DL (ref 70–110)
GLUCOSE BLD STRIP.AUTO-MCNC: 246 MG/DL (ref 70–110)
GLUCOSE BLD STRIP.AUTO-MCNC: 284 MG/DL (ref 70–110)

## 2024-04-18 PROCEDURE — 6360000002 HC RX W HCPCS: Performed by: INTERNAL MEDICINE

## 2024-04-18 PROCEDURE — 97535 SELF CARE MNGMENT TRAINING: CPT

## 2024-04-18 PROCEDURE — 6370000000 HC RX 637 (ALT 250 FOR IP): Performed by: STUDENT IN AN ORGANIZED HEALTH CARE EDUCATION/TRAINING PROGRAM

## 2024-04-18 PROCEDURE — 6370000000 HC RX 637 (ALT 250 FOR IP): Performed by: EMERGENCY MEDICINE

## 2024-04-18 PROCEDURE — 6370000000 HC RX 637 (ALT 250 FOR IP)

## 2024-04-18 PROCEDURE — 2580000003 HC RX 258: Performed by: STUDENT IN AN ORGANIZED HEALTH CARE EDUCATION/TRAINING PROGRAM

## 2024-04-18 PROCEDURE — 82962 GLUCOSE BLOOD TEST: CPT

## 2024-04-18 PROCEDURE — 6370000000 HC RX 637 (ALT 250 FOR IP): Performed by: INTERNAL MEDICINE

## 2024-04-18 PROCEDURE — 97166 OT EVAL MOD COMPLEX 45 MIN: CPT

## 2024-04-18 PROCEDURE — 94660 CPAP INITIATION&MGMT: CPT

## 2024-04-18 PROCEDURE — 97168 OT RE-EVAL EST PLAN CARE: CPT

## 2024-04-18 PROCEDURE — 1100000003 HC PRIVATE W/ TELEMETRY

## 2024-04-18 PROCEDURE — 94669 MECHANICAL CHEST WALL OSCILL: CPT

## 2024-04-18 PROCEDURE — 2580000003 HC RX 258: Performed by: INTERNAL MEDICINE

## 2024-04-18 PROCEDURE — 99232 SBSQ HOSP IP/OBS MODERATE 35: CPT | Performed by: HOSPITALIST

## 2024-04-18 PROCEDURE — 94640 AIRWAY INHALATION TREATMENT: CPT

## 2024-04-18 RX ORDER — PREDNISONE 20 MG/1
40 TABLET ORAL DAILY
Qty: 10 TABLET | Refills: 0 | Status: SHIPPED | OUTPATIENT
Start: 2024-04-19 | End: 2024-05-06 | Stop reason: HOSPADM

## 2024-04-18 RX ADMIN — GUAIFENESIN 600 MG: 600 TABLET, EXTENDED RELEASE ORAL at 22:37

## 2024-04-18 RX ADMIN — CYANOCOBALAMIN TAB 1000 MCG 1000 MCG: 1000 TAB at 09:01

## 2024-04-18 RX ADMIN — SEVELAMER CARBONATE 800 MG: 800 TABLET, FILM COATED ORAL at 15:21

## 2024-04-18 RX ADMIN — Medication 1 MG: at 09:01

## 2024-04-18 RX ADMIN — INSULIN GLARGINE 15 UNITS: 100 INJECTION, SOLUTION SUBCUTANEOUS at 22:38

## 2024-04-18 RX ADMIN — SODIUM CHLORIDE, PRESERVATIVE FREE 10 ML: 5 INJECTION INTRAVENOUS at 09:01

## 2024-04-18 RX ADMIN — ASPIRIN 81 MG CHEWABLE TABLET 81 MG: 81 TABLET CHEWABLE at 08:58

## 2024-04-18 RX ADMIN — INSULIN LISPRO 2 UNITS: 100 INJECTION, SOLUTION INTRAVENOUS; SUBCUTANEOUS at 12:32

## 2024-04-18 RX ADMIN — OXYCODONE AND ACETAMINOPHEN 1 TABLET: 5; 325 TABLET ORAL at 06:35

## 2024-04-18 RX ADMIN — HYDROCODONE POLISTIREX AND CHLORPHENIRAMINE POLISTIREX 5 ML: 10; 8 SUSPENSION, EXTENDED RELEASE ORAL at 00:05

## 2024-04-18 RX ADMIN — BUDESONIDE 1 MG: 1 SUSPENSION RESPIRATORY (INHALATION) at 09:21

## 2024-04-18 RX ADMIN — ARFORMOTEROL TARTRATE 15 MCG: 15 SOLUTION RESPIRATORY (INHALATION) at 09:20

## 2024-04-18 RX ADMIN — HEPARIN SODIUM 5000 UNITS: 5000 INJECTION INTRAVENOUS; SUBCUTANEOUS at 22:30

## 2024-04-18 RX ADMIN — BUDESONIDE 1 MG: 1 SUSPENSION RESPIRATORY (INHALATION) at 19:21

## 2024-04-18 RX ADMIN — ARFORMOTEROL TARTRATE 15 MCG: 15 SOLUTION RESPIRATORY (INHALATION) at 19:21

## 2024-04-18 RX ADMIN — SEVELAMER CARBONATE 800 MG: 800 TABLET, FILM COATED ORAL at 09:00

## 2024-04-18 RX ADMIN — IPRATROPIUM BROMIDE AND ALBUTEROL SULFATE 1 DOSE: .5; 3 SOLUTION RESPIRATORY (INHALATION) at 19:21

## 2024-04-18 RX ADMIN — AZITHROMYCIN DIHYDRATE 500 MG: 500 INJECTION, POWDER, LYOPHILIZED, FOR SOLUTION INTRAVENOUS at 15:21

## 2024-04-18 RX ADMIN — HEPARIN SODIUM 5000 UNITS: 5000 INJECTION INTRAVENOUS; SUBCUTANEOUS at 06:19

## 2024-04-18 RX ADMIN — HYDROCODONE POLISTIREX AND CHLORPHENIRAMINE POLISTIREX 5 ML: 10; 8 SUSPENSION, EXTENDED RELEASE ORAL at 15:22

## 2024-04-18 RX ADMIN — FOLIC ACID 1 MG: 1 TABLET ORAL at 08:58

## 2024-04-18 RX ADMIN — OXYCODONE AND ACETAMINOPHEN 1 TABLET: 5; 325 TABLET ORAL at 11:32

## 2024-04-18 RX ADMIN — ATORVASTATIN CALCIUM 80 MG: 40 TABLET, FILM COATED ORAL at 22:37

## 2024-04-18 RX ADMIN — IPRATROPIUM BROMIDE AND ALBUTEROL SULFATE 1 DOSE: .5; 3 SOLUTION RESPIRATORY (INHALATION) at 13:14

## 2024-04-18 RX ADMIN — BENZONATATE 100 MG: 100 CAPSULE ORAL at 15:21

## 2024-04-18 RX ADMIN — CHOLECALCIFEROL TAB 25 MCG (1000 UNIT) 2000 UNITS: 25 TAB at 09:00

## 2024-04-18 RX ADMIN — GUAIFENESIN 600 MG: 600 TABLET, EXTENDED RELEASE ORAL at 08:58

## 2024-04-18 RX ADMIN — PREDNISONE 40 MG: 20 TABLET ORAL at 09:00

## 2024-04-18 RX ADMIN — SODIUM CHLORIDE, PRESERVATIVE FREE 10 ML: 5 INJECTION INTRAVENOUS at 22:38

## 2024-04-18 RX ADMIN — HEPARIN SODIUM 5000 UNITS: 5000 INJECTION INTRAVENOUS; SUBCUTANEOUS at 15:00

## 2024-04-18 RX ADMIN — SEVELAMER CARBONATE 800 MG: 800 TABLET, FILM COATED ORAL at 11:33

## 2024-04-18 RX ADMIN — EZETIMIBE 10 MG: 10 TABLET ORAL at 08:58

## 2024-04-18 RX ADMIN — BENZONATATE 100 MG: 100 CAPSULE ORAL at 22:37

## 2024-04-18 RX ADMIN — OXYCODONE AND ACETAMINOPHEN 1 TABLET: 5; 325 TABLET ORAL at 17:30

## 2024-04-18 RX ADMIN — PANTOPRAZOLE SODIUM 40 MG: 40 TABLET, DELAYED RELEASE ORAL at 09:00

## 2024-04-18 RX ADMIN — OXYCODONE AND ACETAMINOPHEN 1 TABLET: 5; 325 TABLET ORAL at 22:42

## 2024-04-18 RX ADMIN — IPRATROPIUM BROMIDE AND ALBUTEROL SULFATE 1 DOSE: .5; 3 SOLUTION RESPIRATORY (INHALATION) at 09:20

## 2024-04-18 RX ADMIN — IRON SUCROSE 200 MG: 20 INJECTION, SOLUTION INTRAVENOUS at 11:31

## 2024-04-18 RX ADMIN — GABAPENTIN 300 MG: 300 CAPSULE ORAL at 17:31

## 2024-04-18 RX ADMIN — BENZONATATE 100 MG: 100 CAPSULE ORAL at 08:58

## 2024-04-18 RX ADMIN — INSULIN LISPRO 2 UNITS: 100 INJECTION, SOLUTION INTRAVENOUS; SUBCUTANEOUS at 17:36

## 2024-04-18 ASSESSMENT — PAIN DESCRIPTION - PAIN TYPE
TYPE: ACUTE PAIN
TYPE: ACUTE PAIN

## 2024-04-18 ASSESSMENT — PAIN - FUNCTIONAL ASSESSMENT
PAIN_FUNCTIONAL_ASSESSMENT: ACTIVITIES ARE NOT PREVENTED

## 2024-04-18 ASSESSMENT — PAIN DESCRIPTION - LOCATION
LOCATION: BACK;ARM;CHEST
LOCATION: ARM;BACK
LOCATION: BACK;ARM;CHEST
LOCATION: BACK

## 2024-04-18 ASSESSMENT — PAIN SCALES - WONG BAKER: WONGBAKER_NUMERICALRESPONSE: NO HURT

## 2024-04-18 ASSESSMENT — PAIN DESCRIPTION - FREQUENCY
FREQUENCY: CONTINUOUS
FREQUENCY: CONTINUOUS

## 2024-04-18 ASSESSMENT — PAIN DESCRIPTION - ORIENTATION
ORIENTATION: RIGHT;ANTERIOR;MID
ORIENTATION: RIGHT;ANTERIOR;POSTERIOR
ORIENTATION: RIGHT;ANTERIOR;MID
ORIENTATION: RIGHT;ANTERIOR;POSTERIOR

## 2024-04-18 ASSESSMENT — PAIN DESCRIPTION - DESCRIPTORS
DESCRIPTORS: ACHING

## 2024-04-18 ASSESSMENT — PAIN SCALES - GENERAL
PAINLEVEL_OUTOF10: 0
PAINLEVEL_OUTOF10: 10
PAINLEVEL_OUTOF10: 4
PAINLEVEL_OUTOF10: 10
PAINLEVEL_OUTOF10: 10

## 2024-04-19 LAB
ANION GAP SERPL CALC-SCNC: 5 MMOL/L (ref 3–18)
BASOPHILS # BLD: 0.1 K/UL (ref 0–0.1)
BASOPHILS NFR BLD: 0 % (ref 0–2)
BUN SERPL-MCNC: 86 MG/DL (ref 7–18)
BUN/CREAT SERPL: 25 (ref 12–20)
CALCIUM SERPL-MCNC: 9 MG/DL (ref 8.5–10.1)
CHLORIDE SERPL-SCNC: 105 MMOL/L (ref 100–111)
CO2 SERPL-SCNC: 25 MMOL/L (ref 21–32)
CREAT SERPL-MCNC: 3.47 MG/DL (ref 0.6–1.3)
DIFFERENTIAL METHOD BLD: ABNORMAL
EOSINOPHIL # BLD: 0 K/UL (ref 0–0.4)
EOSINOPHIL NFR BLD: 0 % (ref 0–5)
ERYTHROCYTE [DISTWIDTH] IN BLOOD BY AUTOMATED COUNT: 16.4 % (ref 11.6–14.5)
GLUCOSE BLD STRIP.AUTO-MCNC: 137 MG/DL (ref 70–110)
GLUCOSE BLD STRIP.AUTO-MCNC: 255 MG/DL (ref 70–110)
GLUCOSE BLD STRIP.AUTO-MCNC: 302 MG/DL (ref 70–110)
GLUCOSE BLD STRIP.AUTO-MCNC: 342 MG/DL (ref 70–110)
GLUCOSE SERPL-MCNC: 198 MG/DL (ref 74–99)
HBV SURFACE AG SER QL: <0.1 INDEX
HBV SURFACE AG SER QL: NEGATIVE
HCT VFR BLD AUTO: 27.3 % (ref 35–45)
HGB BLD-MCNC: 8.6 G/DL (ref 12–16)
IMM GRANULOCYTES # BLD AUTO: 0.8 K/UL (ref 0–0.04)
IMM GRANULOCYTES NFR BLD AUTO: 4 % (ref 0–0.5)
LYMPHOCYTES # BLD: 1.8 K/UL (ref 0.9–3.6)
LYMPHOCYTES NFR BLD: 8 % (ref 21–52)
MCH RBC QN AUTO: 30.9 PG (ref 24–34)
MCHC RBC AUTO-ENTMCNC: 31.5 G/DL (ref 31–37)
MCV RBC AUTO: 98.2 FL (ref 78–100)
MONOCYTES # BLD: 1.4 K/UL (ref 0.05–1.2)
MONOCYTES NFR BLD: 6 % (ref 3–10)
NEUTS SEG # BLD: 17.9 K/UL (ref 1.8–8)
NEUTS SEG NFR BLD: 82 % (ref 40–73)
NRBC # BLD: 0.95 K/UL (ref 0–0.01)
NRBC BLD-RTO: 4.3 PER 100 WBC
PLATELET # BLD AUTO: 429 K/UL (ref 135–420)
PMV BLD AUTO: 9.7 FL (ref 9.2–11.8)
POTASSIUM SERPL-SCNC: 5.2 MMOL/L (ref 3.5–5.5)
RBC # BLD AUTO: 2.78 M/UL (ref 4.2–5.3)
SODIUM SERPL-SCNC: 135 MMOL/L (ref 136–145)
WBC # BLD AUTO: 21.9 K/UL (ref 4.6–13.2)

## 2024-04-19 PROCEDURE — 6370000000 HC RX 637 (ALT 250 FOR IP): Performed by: STUDENT IN AN ORGANIZED HEALTH CARE EDUCATION/TRAINING PROGRAM

## 2024-04-19 PROCEDURE — 90935 HEMODIALYSIS ONE EVALUATION: CPT

## 2024-04-19 PROCEDURE — 6360000002 HC RX W HCPCS: Performed by: INTERNAL MEDICINE

## 2024-04-19 PROCEDURE — 82962 GLUCOSE BLOOD TEST: CPT

## 2024-04-19 PROCEDURE — 36415 COLL VENOUS BLD VENIPUNCTURE: CPT

## 2024-04-19 PROCEDURE — 94761 N-INVAS EAR/PLS OXIMETRY MLT: CPT

## 2024-04-19 PROCEDURE — 2580000003 HC RX 258: Performed by: STUDENT IN AN ORGANIZED HEALTH CARE EDUCATION/TRAINING PROGRAM

## 2024-04-19 PROCEDURE — 6370000000 HC RX 637 (ALT 250 FOR IP): Performed by: INTERNAL MEDICINE

## 2024-04-19 PROCEDURE — 99232 SBSQ HOSP IP/OBS MODERATE 35: CPT | Performed by: HOSPITALIST

## 2024-04-19 PROCEDURE — 94669 MECHANICAL CHEST WALL OSCILL: CPT

## 2024-04-19 PROCEDURE — 94660 CPAP INITIATION&MGMT: CPT

## 2024-04-19 PROCEDURE — 85025 COMPLETE CBC W/AUTO DIFF WBC: CPT

## 2024-04-19 PROCEDURE — 87340 HEPATITIS B SURFACE AG IA: CPT

## 2024-04-19 PROCEDURE — 80048 BASIC METABOLIC PNL TOTAL CA: CPT

## 2024-04-19 PROCEDURE — 6370000000 HC RX 637 (ALT 250 FOR IP)

## 2024-04-19 PROCEDURE — 1100000003 HC PRIVATE W/ TELEMETRY

## 2024-04-19 PROCEDURE — 6370000000 HC RX 637 (ALT 250 FOR IP): Performed by: EMERGENCY MEDICINE

## 2024-04-19 PROCEDURE — 94640 AIRWAY INHALATION TREATMENT: CPT

## 2024-04-19 RX ADMIN — SODIUM CHLORIDE, PRESERVATIVE FREE 10 ML: 5 INJECTION INTRAVENOUS at 08:57

## 2024-04-19 RX ADMIN — HEPARIN SODIUM 5000 UNITS: 5000 INJECTION INTRAVENOUS; SUBCUTANEOUS at 21:36

## 2024-04-19 RX ADMIN — INSULIN GLARGINE 15 UNITS: 100 INJECTION, SOLUTION SUBCUTANEOUS at 21:39

## 2024-04-19 RX ADMIN — GUAIFENESIN 600 MG: 600 TABLET, EXTENDED RELEASE ORAL at 21:36

## 2024-04-19 RX ADMIN — GABAPENTIN 300 MG: 300 CAPSULE ORAL at 17:51

## 2024-04-19 RX ADMIN — BENZONATATE 100 MG: 100 CAPSULE ORAL at 08:54

## 2024-04-19 RX ADMIN — PREDNISONE 40 MG: 20 TABLET ORAL at 08:54

## 2024-04-19 RX ADMIN — INSULIN LISPRO 4 UNITS: 100 INJECTION, SOLUTION INTRAVENOUS; SUBCUTANEOUS at 21:38

## 2024-04-19 RX ADMIN — IRON SUCROSE 200 MG: 20 INJECTION, SOLUTION INTRAVENOUS at 14:06

## 2024-04-19 RX ADMIN — INSULIN LISPRO 4 UNITS: 100 INJECTION, SOLUTION INTRAVENOUS; SUBCUTANEOUS at 13:57

## 2024-04-19 RX ADMIN — BENZONATATE 100 MG: 100 CAPSULE ORAL at 15:54

## 2024-04-19 RX ADMIN — MIDODRINE HYDROCHLORIDE 10 MG: 10 TABLET ORAL at 21:37

## 2024-04-19 RX ADMIN — ARFORMOTEROL TARTRATE 15 MCG: 15 SOLUTION RESPIRATORY (INHALATION) at 20:05

## 2024-04-19 RX ADMIN — SEVELAMER CARBONATE 800 MG: 800 TABLET, FILM COATED ORAL at 08:52

## 2024-04-19 RX ADMIN — OXYCODONE AND ACETAMINOPHEN 1 TABLET: 5; 325 TABLET ORAL at 14:05

## 2024-04-19 RX ADMIN — EPOETIN ALFA-EPBX 6000 UNITS: 3000 INJECTION, SOLUTION INTRAVENOUS; SUBCUTANEOUS at 17:52

## 2024-04-19 RX ADMIN — IPRATROPIUM BROMIDE AND ALBUTEROL SULFATE 1 DOSE: .5; 3 SOLUTION RESPIRATORY (INHALATION) at 20:06

## 2024-04-19 RX ADMIN — SEVELAMER CARBONATE 800 MG: 800 TABLET, FILM COATED ORAL at 17:51

## 2024-04-19 RX ADMIN — IPRATROPIUM BROMIDE AND ALBUTEROL SULFATE 1 DOSE: .5; 3 SOLUTION RESPIRATORY (INHALATION) at 15:25

## 2024-04-19 RX ADMIN — ASPIRIN 81 MG CHEWABLE TABLET 81 MG: 81 TABLET CHEWABLE at 08:57

## 2024-04-19 RX ADMIN — SEVELAMER CARBONATE 800 MG: 800 TABLET, FILM COATED ORAL at 13:57

## 2024-04-19 RX ADMIN — MIDODRINE HYDROCHLORIDE 10 MG: 10 TABLET ORAL at 08:56

## 2024-04-19 RX ADMIN — OXYCODONE AND ACETAMINOPHEN 1 TABLET: 5; 325 TABLET ORAL at 21:47

## 2024-04-19 RX ADMIN — PANTOPRAZOLE SODIUM 40 MG: 40 TABLET, DELAYED RELEASE ORAL at 08:52

## 2024-04-19 RX ADMIN — CYANOCOBALAMIN TAB 1000 MCG 1000 MCG: 1000 TAB at 08:58

## 2024-04-19 RX ADMIN — ALLOPURINOL 100 MG: 100 TABLET ORAL at 21:42

## 2024-04-19 RX ADMIN — ATORVASTATIN CALCIUM 80 MG: 40 TABLET, FILM COATED ORAL at 21:37

## 2024-04-19 RX ADMIN — FOLIC ACID 1 MG: 1 TABLET ORAL at 08:52

## 2024-04-19 RX ADMIN — BENZONATATE 100 MG: 100 CAPSULE ORAL at 13:57

## 2024-04-19 RX ADMIN — SODIUM CHLORIDE, PRESERVATIVE FREE 10 ML: 5 INJECTION INTRAVENOUS at 21:43

## 2024-04-19 RX ADMIN — EZETIMIBE 10 MG: 10 TABLET ORAL at 09:05

## 2024-04-19 RX ADMIN — BENZONATATE 100 MG: 100 CAPSULE ORAL at 21:40

## 2024-04-19 RX ADMIN — CHOLECALCIFEROL TAB 25 MCG (1000 UNIT) 2000 UNITS: 25 TAB at 08:53

## 2024-04-19 RX ADMIN — GUAIFENESIN 600 MG: 600 TABLET, EXTENDED RELEASE ORAL at 08:52

## 2024-04-19 RX ADMIN — OXYCODONE AND ACETAMINOPHEN 1 TABLET: 5; 325 TABLET ORAL at 06:26

## 2024-04-19 RX ADMIN — BUDESONIDE 1 MG: 1 SUSPENSION RESPIRATORY (INHALATION) at 07:21

## 2024-04-19 RX ADMIN — HEPARIN SODIUM 5000 UNITS: 5000 INJECTION INTRAVENOUS; SUBCUTANEOUS at 13:57

## 2024-04-19 RX ADMIN — IPRATROPIUM BROMIDE AND ALBUTEROL SULFATE 1 DOSE: .5; 3 SOLUTION RESPIRATORY (INHALATION) at 07:21

## 2024-04-19 RX ADMIN — ARFORMOTEROL TARTRATE 15 MCG: 15 SOLUTION RESPIRATORY (INHALATION) at 07:21

## 2024-04-19 RX ADMIN — Medication 1 MG: at 08:58

## 2024-04-19 RX ADMIN — HEPARIN SODIUM 5000 UNITS: 5000 INJECTION INTRAVENOUS; SUBCUTANEOUS at 06:06

## 2024-04-19 RX ADMIN — BUDESONIDE 1 MG: 1 SUSPENSION RESPIRATORY (INHALATION) at 20:06

## 2024-04-19 RX ADMIN — INSULIN LISPRO 6 UNITS: 100 INJECTION, SOLUTION INTRAVENOUS; SUBCUTANEOUS at 17:51

## 2024-04-19 ASSESSMENT — PAIN DESCRIPTION - ORIENTATION
ORIENTATION: LEFT
ORIENTATION: MID;POSTERIOR
ORIENTATION: LEFT
ORIENTATION: ANTERIOR;LEFT

## 2024-04-19 ASSESSMENT — PAIN DESCRIPTION - PAIN TYPE
TYPE: ACUTE PAIN
TYPE: ACUTE PAIN
TYPE: CHRONIC PAIN
TYPE: ACUTE PAIN

## 2024-04-19 ASSESSMENT — PAIN SCALES - GENERAL
PAINLEVEL_OUTOF10: 10
PAINLEVEL_OUTOF10: 10
PAINLEVEL_OUTOF10: 0
PAINLEVEL_OUTOF10: 10
PAINLEVEL_OUTOF10: 0
PAINLEVEL_OUTOF10: 8
PAINLEVEL_OUTOF10: 0
PAINLEVEL_OUTOF10: 8

## 2024-04-19 ASSESSMENT — PAIN DESCRIPTION - LOCATION
LOCATION: HAND
LOCATION: ARM
LOCATION: ARM
LOCATION: ARM;BACK;CHEST

## 2024-04-19 ASSESSMENT — PAIN SCALES - WONG BAKER
WONGBAKER_NUMERICALRESPONSE: HURTS A LITTLE BIT
WONGBAKER_NUMERICALRESPONSE: NO HURT
WONGBAKER_NUMERICALRESPONSE: NO HURT

## 2024-04-19 ASSESSMENT — PAIN DESCRIPTION - DESCRIPTORS
DESCRIPTORS: ACHING
DESCRIPTORS: BURNING
DESCRIPTORS: ACHING;BURNING
DESCRIPTORS: ACHING;BURNING

## 2024-04-19 ASSESSMENT — PAIN DESCRIPTION - ONSET
ONSET: ON-GOING

## 2024-04-19 ASSESSMENT — PAIN DESCRIPTION - FREQUENCY
FREQUENCY: CONTINUOUS

## 2024-04-19 NOTE — DIALYSIS
Treatment summary  Received report from Leeroy GILMORE Rn    Patient arrived 5N in bed in stable condition,  a/o 4, VSS,   LUE AVG accessed without complication, treatment initiated.    Dialyzed patient in  room 547  for 3 hours.     Total Uf 2100  ml  Net UF 1600 ml     Treatment note:           Treatment terminated early due to c/o pain to L  AVG site. Patient flexed her arm and   Needle displace and caused swelling. Needle repositioned and and treatment   Proceeded, but pt. Keep c/o pain and request treatment to stop. Dr. Fowler  notified, ok to stop treatment.   .    Offered assistance with repositioning every 2 hours.      Vascular access visible at all times and line connected at all   times during treatment. Access L AVG Functioning well positive B/T with   Moderate  swelling  proximal to the graft site.     Report given to Leeroy GILMORE RN with all questions answered.

## 2024-04-20 LAB
GLUCOSE BLD STRIP.AUTO-MCNC: 132 MG/DL (ref 70–110)
GLUCOSE BLD STRIP.AUTO-MCNC: 206 MG/DL (ref 70–110)
GLUCOSE BLD STRIP.AUTO-MCNC: 263 MG/DL (ref 70–110)
GLUCOSE BLD STRIP.AUTO-MCNC: 289 MG/DL (ref 70–110)
GLUCOSE BLD STRIP.AUTO-MCNC: 476 MG/DL (ref 70–110)

## 2024-04-20 PROCEDURE — 6370000000 HC RX 637 (ALT 250 FOR IP): Performed by: INTERNAL MEDICINE

## 2024-04-20 PROCEDURE — 6360000002 HC RX W HCPCS: Performed by: INTERNAL MEDICINE

## 2024-04-20 PROCEDURE — 94761 N-INVAS EAR/PLS OXIMETRY MLT: CPT

## 2024-04-20 PROCEDURE — 6370000000 HC RX 637 (ALT 250 FOR IP)

## 2024-04-20 PROCEDURE — 1100000003 HC PRIVATE W/ TELEMETRY

## 2024-04-20 PROCEDURE — 2580000003 HC RX 258: Performed by: STUDENT IN AN ORGANIZED HEALTH CARE EDUCATION/TRAINING PROGRAM

## 2024-04-20 PROCEDURE — 99232 SBSQ HOSP IP/OBS MODERATE 35: CPT | Performed by: HOSPITALIST

## 2024-04-20 PROCEDURE — 6370000000 HC RX 637 (ALT 250 FOR IP): Performed by: STUDENT IN AN ORGANIZED HEALTH CARE EDUCATION/TRAINING PROGRAM

## 2024-04-20 PROCEDURE — 94669 MECHANICAL CHEST WALL OSCILL: CPT

## 2024-04-20 PROCEDURE — 94640 AIRWAY INHALATION TREATMENT: CPT

## 2024-04-20 PROCEDURE — 6370000000 HC RX 637 (ALT 250 FOR IP): Performed by: EMERGENCY MEDICINE

## 2024-04-20 PROCEDURE — 82962 GLUCOSE BLOOD TEST: CPT

## 2024-04-20 RX ADMIN — SEVELAMER CARBONATE 800 MG: 800 TABLET, FILM COATED ORAL at 16:58

## 2024-04-20 RX ADMIN — MIDODRINE HYDROCHLORIDE 10 MG: 10 TABLET ORAL at 09:01

## 2024-04-20 RX ADMIN — CYANOCOBALAMIN TAB 1000 MCG 1000 MCG: 1000 TAB at 09:02

## 2024-04-20 RX ADMIN — SEVELAMER CARBONATE 800 MG: 800 TABLET, FILM COATED ORAL at 09:01

## 2024-04-20 RX ADMIN — OXYCODONE AND ACETAMINOPHEN 1 TABLET: 5; 325 TABLET ORAL at 14:09

## 2024-04-20 RX ADMIN — OXYCODONE AND ACETAMINOPHEN 1 TABLET: 5; 325 TABLET ORAL at 20:28

## 2024-04-20 RX ADMIN — INSULIN GLARGINE 15 UNITS: 100 INJECTION, SOLUTION SUBCUTANEOUS at 20:30

## 2024-04-20 RX ADMIN — PANTOPRAZOLE SODIUM 40 MG: 40 TABLET, DELAYED RELEASE ORAL at 09:01

## 2024-04-20 RX ADMIN — SEVELAMER CARBONATE 800 MG: 800 TABLET, FILM COATED ORAL at 11:32

## 2024-04-20 RX ADMIN — BENZONATATE 100 MG: 100 CAPSULE ORAL at 20:28

## 2024-04-20 RX ADMIN — MIDODRINE HYDROCHLORIDE 10 MG: 10 TABLET ORAL at 14:09

## 2024-04-20 RX ADMIN — INSULIN LISPRO 2 UNITS: 100 INJECTION, SOLUTION INTRAVENOUS; SUBCUTANEOUS at 11:56

## 2024-04-20 RX ADMIN — BUDESONIDE 1 MG: 1 SUSPENSION RESPIRATORY (INHALATION) at 19:58

## 2024-04-20 RX ADMIN — SODIUM CHLORIDE, PRESERVATIVE FREE 10 ML: 5 INJECTION INTRAVENOUS at 09:03

## 2024-04-20 RX ADMIN — GUAIFENESIN 600 MG: 600 TABLET, EXTENDED RELEASE ORAL at 20:28

## 2024-04-20 RX ADMIN — Medication 1 MG: at 09:05

## 2024-04-20 RX ADMIN — BENZONATATE 100 MG: 100 CAPSULE ORAL at 09:01

## 2024-04-20 RX ADMIN — FOLIC ACID 1 MG: 1 TABLET ORAL at 09:02

## 2024-04-20 RX ADMIN — BENZONATATE 100 MG: 100 CAPSULE ORAL at 14:10

## 2024-04-20 RX ADMIN — IRON SUCROSE 200 MG: 20 INJECTION, SOLUTION INTRAVENOUS at 11:32

## 2024-04-20 RX ADMIN — PREDNISONE 40 MG: 20 TABLET ORAL at 09:01

## 2024-04-20 RX ADMIN — OXYCODONE AND ACETAMINOPHEN 1 TABLET: 5; 325 TABLET ORAL at 08:59

## 2024-04-20 RX ADMIN — ARFORMOTEROL TARTRATE 15 MCG: 15 SOLUTION RESPIRATORY (INHALATION) at 19:58

## 2024-04-20 RX ADMIN — SODIUM CHLORIDE, PRESERVATIVE FREE 10 ML: 5 INJECTION INTRAVENOUS at 20:30

## 2024-04-20 RX ADMIN — HYDROCODONE POLISTIREX AND CHLORPHENIRAMINE POLISTIREX 5 ML: 10; 8 SUSPENSION, EXTENDED RELEASE ORAL at 11:32

## 2024-04-20 RX ADMIN — INSULIN LISPRO 8 UNITS: 100 INJECTION, SOLUTION INTRAVENOUS; SUBCUTANEOUS at 17:02

## 2024-04-20 RX ADMIN — CHOLECALCIFEROL TAB 25 MCG (1000 UNIT) 2000 UNITS: 25 TAB at 09:02

## 2024-04-20 RX ADMIN — ASPIRIN 81 MG CHEWABLE TABLET 81 MG: 81 TABLET CHEWABLE at 09:00

## 2024-04-20 RX ADMIN — HEPARIN SODIUM 5000 UNITS: 5000 INJECTION INTRAVENOUS; SUBCUTANEOUS at 14:10

## 2024-04-20 RX ADMIN — EZETIMIBE 10 MG: 10 TABLET ORAL at 09:02

## 2024-04-20 RX ADMIN — HEPARIN SODIUM 5000 UNITS: 5000 INJECTION INTRAVENOUS; SUBCUTANEOUS at 05:59

## 2024-04-20 RX ADMIN — ATORVASTATIN CALCIUM 80 MG: 40 TABLET, FILM COATED ORAL at 20:28

## 2024-04-20 RX ADMIN — HEPARIN SODIUM 5000 UNITS: 5000 INJECTION INTRAVENOUS; SUBCUTANEOUS at 21:36

## 2024-04-20 RX ADMIN — OXYCODONE AND ACETAMINOPHEN 1 TABLET: 5; 325 TABLET ORAL at 04:36

## 2024-04-20 RX ADMIN — GABAPENTIN 300 MG: 300 CAPSULE ORAL at 17:01

## 2024-04-20 RX ADMIN — IPRATROPIUM BROMIDE AND ALBUTEROL SULFATE 1 DOSE: .5; 3 SOLUTION RESPIRATORY (INHALATION) at 19:58

## 2024-04-20 RX ADMIN — GUAIFENESIN 600 MG: 600 TABLET, EXTENDED RELEASE ORAL at 09:01

## 2024-04-20 ASSESSMENT — PAIN DESCRIPTION - ONSET
ONSET: ON-GOING

## 2024-04-20 ASSESSMENT — PAIN DESCRIPTION - DESCRIPTORS
DESCRIPTORS: ACHING
DESCRIPTORS: SHARP
DESCRIPTORS: BURNING;DISCOMFORT
DESCRIPTORS: ACHING;NAGGING
DESCRIPTORS: SHARP
DESCRIPTORS: ACHING;NAGGING
DESCRIPTORS: ACHING;DISCOMFORT;NAGGING
DESCRIPTORS: ACHING

## 2024-04-20 ASSESSMENT — PAIN SCALES - GENERAL
PAINLEVEL_OUTOF10: 10
PAINLEVEL_OUTOF10: 5
PAINLEVEL_OUTOF10: 10
PAINLEVEL_OUTOF10: 6

## 2024-04-20 ASSESSMENT — PAIN - FUNCTIONAL ASSESSMENT

## 2024-04-20 ASSESSMENT — PAIN DESCRIPTION - LOCATION
LOCATION: HAND;ABDOMEN
LOCATION: BACK;ABDOMEN
LOCATION: ARM;CHEST
LOCATION: CHEST;ARM;BACK
LOCATION: ARM;BACK
LOCATION: ARM;BACK
LOCATION: ARM;BACK;CHEST
LOCATION: BACK;CHEST;ARM
LOCATION: BACK;ARM
LOCATION: BACK

## 2024-04-20 ASSESSMENT — PAIN DESCRIPTION - PAIN TYPE
TYPE: ACUTE PAIN
TYPE: ACUTE PAIN
TYPE: CHRONIC PAIN
TYPE: ACUTE PAIN
TYPE: ACUTE PAIN

## 2024-04-20 ASSESSMENT — PAIN DESCRIPTION - ORIENTATION
ORIENTATION: LEFT;POSTERIOR
ORIENTATION: ANTERIOR;POSTERIOR;LEFT
ORIENTATION: RIGHT;LEFT
ORIENTATION: ANTERIOR;LEFT;POSTERIOR
ORIENTATION: RIGHT
ORIENTATION: RIGHT;LEFT
ORIENTATION: RIGHT;LEFT
ORIENTATION: ANTERIOR
ORIENTATION: ANTERIOR
ORIENTATION: ANTERIOR;POSTERIOR

## 2024-04-20 ASSESSMENT — PAIN DESCRIPTION - FREQUENCY
FREQUENCY: INTERMITTENT
FREQUENCY: CONTINUOUS

## 2024-04-21 ENCOUNTER — APPOINTMENT (OUTPATIENT)
Facility: HOSPITAL | Age: 74
DRG: 208 | End: 2024-04-21
Payer: MEDICARE

## 2024-04-21 ENCOUNTER — APPOINTMENT (OUTPATIENT)
Facility: HOSPITAL | Age: 74
DRG: 208 | End: 2024-04-21
Attending: INTERNAL MEDICINE
Payer: MEDICARE

## 2024-04-21 LAB
ALBUMIN SERPL-MCNC: 3.2 G/DL (ref 3.4–5)
ALBUMIN/GLOB SERPL: 1.1 (ref 0.8–1.7)
ALP SERPL-CCNC: 148 U/L (ref 45–117)
ALT SERPL-CCNC: 24 U/L (ref 13–56)
ANION GAP BLD CALC-SCNC: ABNORMAL MMOL/L (ref 10–20)
ANION GAP SERPL CALC-SCNC: 7 MMOL/L (ref 3–18)
ARTERIAL PATENCY WRIST A: POSITIVE
AST SERPL-CCNC: 16 U/L (ref 10–38)
BASE DEFICIT BLD-SCNC: 10.3 MMOL/L
BASE DEFICIT BLD-SCNC: 4.3 MMOL/L
BDY SITE: ABNORMAL
BDY SITE: ABNORMAL
BILIRUB SERPL-MCNC: 0.8 MG/DL (ref 0.2–1)
BUN SERPL-MCNC: 91 MG/DL (ref 7–18)
BUN/CREAT SERPL: 24 (ref 12–20)
CA-I BLD-MCNC: 1.18 MMOL/L (ref 1.12–1.32)
CALCIUM SERPL-MCNC: 8.7 MG/DL (ref 8.5–10.1)
CHLORIDE BLD-SCNC: 105 MMOL/L (ref 98–107)
CHLORIDE SERPL-SCNC: 104 MMOL/L (ref 100–111)
CO2 BLD-SCNC: 25 MMOL/L (ref 19–24)
CO2 SERPL-SCNC: 23 MMOL/L (ref 21–32)
CREAT BLD-MCNC: 4.97 MG/DL (ref 0.6–1.3)
CREAT SERPL-MCNC: 3.72 MG/DL (ref 0.6–1.3)
ERYTHROCYTE [DISTWIDTH] IN BLOOD BY AUTOMATED COUNT: 18 % (ref 11.6–14.5)
FIO2 ON VENT: 100 %
GAS FLOW.O2 O2 DELIVERY SYS: ABNORMAL
GAS FLOW.O2 O2 DELIVERY SYS: ABNORMAL
GLOBULIN SER CALC-MCNC: 3 G/DL (ref 2–4)
GLUCOSE BLD STRIP.AUTO-MCNC: 127 MG/DL (ref 70–110)
GLUCOSE BLD STRIP.AUTO-MCNC: 148 MG/DL (ref 70–110)
GLUCOSE BLD STRIP.AUTO-MCNC: 171 MG/DL (ref 70–110)
GLUCOSE BLD STRIP.AUTO-MCNC: 308 MG/DL (ref 70–110)
GLUCOSE BLD-MCNC: 363 MG/DL (ref 65–100)
GLUCOSE SERPL-MCNC: 315 MG/DL (ref 74–99)
HCO3 BLD-SCNC: 18.7 MMOL/L (ref 22–26)
HCO3 BLD-SCNC: 24.6 MMOL/L (ref 22–26)
HCT VFR BLD AUTO: 24.4 % (ref 35–45)
HGB BLD-MCNC: 7.5 G/DL (ref 12–16)
INSPIRATION.DURATION SETTING TIME VENT: 0.8 SEC
LACTATE BLD-SCNC: 4.87 MMOL/L (ref 0.4–2)
LACTATE SERPL-SCNC: 3.1 MMOL/L (ref 0.4–2)
MCH RBC QN AUTO: 31.4 PG (ref 24–34)
MCHC RBC AUTO-ENTMCNC: 30.7 G/DL (ref 31–37)
MCV RBC AUTO: 102.1 FL (ref 78–100)
NRBC # BLD: 1.54 K/UL (ref 0–0.01)
NRBC BLD-RTO: 6.5 PER 100 WBC
PCO2 BLD: 55.4 MMHG (ref 35–45)
PCO2 BLDV: 69.1 MMHG (ref 41–51)
PEEP RESPIRATORY: 8
PH BLD: 7.14 (ref 7.35–7.45)
PH BLDV: 7.16 (ref 7.32–7.42)
PLATELET # BLD AUTO: 450 K/UL (ref 135–420)
PMV BLD AUTO: 10 FL (ref 9.2–11.8)
PO2 BLD: 223 MMHG (ref 80–100)
PO2 BLDV: 45 MMHG (ref 25–40)
POTASSIUM BLD-SCNC: 7.4 MMOL/L (ref 3.5–5.1)
POTASSIUM SERPL-SCNC: 6.3 MMOL/L (ref 3.5–5.5)
PROT SERPL-MCNC: 6.2 G/DL (ref 6.4–8.2)
RBC # BLD AUTO: 2.39 M/UL (ref 4.2–5.3)
RESPIRATORY RATE, POC: 20 (ref 5–40)
SAO2 % BLD: 66 %
SAO2 % BLD: 99.5 % (ref 92–97)
SERVICE CMNT-IMP: ABNORMAL
SERVICE CMNT-IMP: ABNORMAL
SODIUM BLD-SCNC: 136 MMOL/L (ref 136–145)
SODIUM SERPL-SCNC: 134 MMOL/L (ref 136–145)
SPECIMEN SITE: ABNORMAL
SPECIMEN TYPE: ABNORMAL
TROPONIN I SERPL HS-MCNC: 56 NG/L (ref 0–54)
VENTILATION MODE VENT: ABNORMAL
VT SETTING VENT: 360
WBC # BLD AUTO: 23.9 K/UL (ref 4.6–13.2)

## 2024-04-21 PROCEDURE — 80053 COMPREHEN METABOLIC PANEL: CPT

## 2024-04-21 PROCEDURE — 84295 ASSAY OF SERUM SODIUM: CPT

## 2024-04-21 PROCEDURE — 36600 WITHDRAWAL OF ARTERIAL BLOOD: CPT

## 2024-04-21 PROCEDURE — 6370000000 HC RX 637 (ALT 250 FOR IP): Performed by: PHYSICIAN ASSISTANT

## 2024-04-21 PROCEDURE — 99291 CRITICAL CARE FIRST HOUR: CPT | Performed by: PHYSICIAN ASSISTANT

## 2024-04-21 PROCEDURE — 6370000000 HC RX 637 (ALT 250 FOR IP): Performed by: INTERNAL MEDICINE

## 2024-04-21 PROCEDURE — 31500 INSERT EMERGENCY AIRWAY: CPT

## 2024-04-21 PROCEDURE — 94761 N-INVAS EAR/PLS OXIMETRY MLT: CPT

## 2024-04-21 PROCEDURE — 93005 ELECTROCARDIOGRAM TRACING: CPT | Performed by: HOSPITALIST

## 2024-04-21 PROCEDURE — 84132 ASSAY OF SERUM POTASSIUM: CPT

## 2024-04-21 PROCEDURE — 97530 THERAPEUTIC ACTIVITIES: CPT

## 2024-04-21 PROCEDURE — A4216 STERILE WATER/SALINE, 10 ML: HCPCS | Performed by: PHYSICIAN ASSISTANT

## 2024-04-21 PROCEDURE — 6360000002 HC RX W HCPCS: Performed by: PHYSICIAN ASSISTANT

## 2024-04-21 PROCEDURE — 82803 BLOOD GASES ANY COMBINATION: CPT

## 2024-04-21 PROCEDURE — 5A1945Z RESPIRATORY VENTILATION, 24-96 CONSECUTIVE HOURS: ICD-10-PCS | Performed by: SURGERY

## 2024-04-21 PROCEDURE — 6360000002 HC RX W HCPCS: Performed by: INTERNAL MEDICINE

## 2024-04-21 PROCEDURE — 82947 ASSAY GLUCOSE BLOOD QUANT: CPT

## 2024-04-21 PROCEDURE — 99232 SBSQ HOSP IP/OBS MODERATE 35: CPT | Performed by: HOSPITALIST

## 2024-04-21 PROCEDURE — 2500000003 HC RX 250 WO HCPCS

## 2024-04-21 PROCEDURE — 2500000003 HC RX 250 WO HCPCS: Performed by: INTERNAL MEDICINE

## 2024-04-21 PROCEDURE — 82962 GLUCOSE BLOOD TEST: CPT

## 2024-04-21 PROCEDURE — 6360000002 HC RX W HCPCS: Performed by: STUDENT IN AN ORGANIZED HEALTH CARE EDUCATION/TRAINING PROGRAM

## 2024-04-21 PROCEDURE — 6370000000 HC RX 637 (ALT 250 FOR IP): Performed by: HOSPITALIST

## 2024-04-21 PROCEDURE — 2500000003 HC RX 250 WO HCPCS: Performed by: HOSPITALIST

## 2024-04-21 PROCEDURE — 74018 RADEX ABDOMEN 1 VIEW: CPT

## 2024-04-21 PROCEDURE — 2580000003 HC RX 258: Performed by: STUDENT IN AN ORGANIZED HEALTH CARE EDUCATION/TRAINING PROGRAM

## 2024-04-21 PROCEDURE — 2700000000 HC OXYGEN THERAPY PER DAY

## 2024-04-21 PROCEDURE — 71045 X-RAY EXAM CHEST 1 VIEW: CPT

## 2024-04-21 PROCEDURE — 93990 DOPPLER FLOW TESTING: CPT

## 2024-04-21 PROCEDURE — 94640 AIRWAY INHALATION TREATMENT: CPT

## 2024-04-21 PROCEDURE — 2580000003 HC RX 258: Performed by: PHYSICIAN ASSISTANT

## 2024-04-21 PROCEDURE — 85014 HEMATOCRIT: CPT

## 2024-04-21 PROCEDURE — 90935 HEMODIALYSIS ONE EVALUATION: CPT

## 2024-04-21 PROCEDURE — 6370000000 HC RX 637 (ALT 250 FOR IP): Performed by: EMERGENCY MEDICINE

## 2024-04-21 PROCEDURE — 36415 COLL VENOUS BLD VENIPUNCTURE: CPT

## 2024-04-21 PROCEDURE — C9113 INJ PANTOPRAZOLE SODIUM, VIA: HCPCS | Performed by: PHYSICIAN ASSISTANT

## 2024-04-21 PROCEDURE — P9047 ALBUMIN (HUMAN), 25%, 50ML: HCPCS | Performed by: PHYSICIAN ASSISTANT

## 2024-04-21 PROCEDURE — 82330 ASSAY OF CALCIUM: CPT

## 2024-04-21 PROCEDURE — 6370000000 HC RX 637 (ALT 250 FOR IP): Performed by: STUDENT IN AN ORGANIZED HEALTH CARE EDUCATION/TRAINING PROGRAM

## 2024-04-21 PROCEDURE — 84484 ASSAY OF TROPONIN QUANT: CPT

## 2024-04-21 PROCEDURE — 83605 ASSAY OF LACTIC ACID: CPT

## 2024-04-21 PROCEDURE — 2580000003 HC RX 258: Performed by: INTERNAL MEDICINE

## 2024-04-21 PROCEDURE — 97110 THERAPEUTIC EXERCISES: CPT

## 2024-04-21 PROCEDURE — 94002 VENT MGMT INPAT INIT DAY: CPT

## 2024-04-21 PROCEDURE — 85027 COMPLETE CBC AUTOMATED: CPT

## 2024-04-21 PROCEDURE — 6370000000 HC RX 637 (ALT 250 FOR IP)

## 2024-04-21 PROCEDURE — 2000000000 HC ICU R&B

## 2024-04-21 PROCEDURE — 87040 BLOOD CULTURE FOR BACTERIA: CPT

## 2024-04-21 PROCEDURE — 6360000002 HC RX W HCPCS

## 2024-04-21 RX ORDER — NITROGLYCERIN 0.4 MG/1
0.4 TABLET SUBLINGUAL EVERY 5 MIN PRN
Status: DISCONTINUED | OUTPATIENT
Start: 2024-04-21 | End: 2024-05-07 | Stop reason: HOSPADM

## 2024-04-21 RX ORDER — DEXMEDETOMIDINE HYDROCHLORIDE 4 UG/ML
.1-1.5 INJECTION, SOLUTION INTRAVENOUS CONTINUOUS
Status: DISCONTINUED | OUTPATIENT
Start: 2024-04-21 | End: 2024-04-24

## 2024-04-21 RX ORDER — BUMETANIDE 0.25 MG/ML
2 INJECTION INTRAMUSCULAR; INTRAVENOUS ONCE
Status: COMPLETED | OUTPATIENT
Start: 2024-04-21 | End: 2024-04-21

## 2024-04-21 RX ORDER — PROPOFOL 10 MG/ML
5-50 INJECTION, EMULSION INTRAVENOUS CONTINUOUS
Status: DISCONTINUED | OUTPATIENT
Start: 2024-04-21 | End: 2024-04-22

## 2024-04-21 RX ORDER — METOPROLOL TARTRATE 1 MG/ML
5 INJECTION, SOLUTION INTRAVENOUS ONCE
Status: COMPLETED | OUTPATIENT
Start: 2024-04-21 | End: 2024-04-21

## 2024-04-21 RX ORDER — MINERAL OIL AND WHITE PETROLATUM 150; 830 MG/G; MG/G
OINTMENT OPHTHALMIC EVERY 4 HOURS
Status: DISCONTINUED | OUTPATIENT
Start: 2024-04-21 | End: 2024-05-02

## 2024-04-21 RX ORDER — FENTANYL CITRATE 50 UG/ML
50 INJECTION, SOLUTION INTRAMUSCULAR; INTRAVENOUS
Status: DISCONTINUED | OUTPATIENT
Start: 2024-04-21 | End: 2024-04-24

## 2024-04-21 RX ORDER — FUROSEMIDE 10 MG/ML
INJECTION INTRAMUSCULAR; INTRAVENOUS
Status: COMPLETED
Start: 2024-04-21 | End: 2024-04-21

## 2024-04-21 RX ORDER — FENTANYL CITRATE-0.9 % NACL/PF 10 MCG/ML
25-200 PLASTIC BAG, INJECTION (ML) INTRAVENOUS CONTINUOUS
Status: DISCONTINUED | OUTPATIENT
Start: 2024-04-21 | End: 2024-04-24

## 2024-04-21 RX ORDER — IPRATROPIUM BROMIDE AND ALBUTEROL SULFATE 2.5; .5 MG/3ML; MG/3ML
1 SOLUTION RESPIRATORY (INHALATION)
Status: DISCONTINUED | OUTPATIENT
Start: 2024-04-21 | End: 2024-05-06

## 2024-04-21 RX ORDER — CHLOROTHIAZIDE SODIUM 500 MG/1
500 INJECTION INTRAVENOUS ONCE
Status: COMPLETED | OUTPATIENT
Start: 2024-04-21 | End: 2024-04-21

## 2024-04-21 RX ORDER — PROPOFOL 10 MG/ML
INJECTION, EMULSION INTRAVENOUS
Status: COMPLETED
Start: 2024-04-21 | End: 2024-04-21

## 2024-04-21 RX ORDER — ALBUMIN (HUMAN) 12.5 G/50ML
25 SOLUTION INTRAVENOUS ONCE
Status: COMPLETED | OUTPATIENT
Start: 2024-04-21 | End: 2024-04-21

## 2024-04-21 RX ORDER — CALCIUM GLUCONATE 20 MG/ML
INJECTION, SOLUTION INTRAVENOUS
Status: COMPLETED
Start: 2024-04-21 | End: 2024-04-21

## 2024-04-21 RX ORDER — CHLORHEXIDINE GLUCONATE ORAL RINSE 1.2 MG/ML
15 SOLUTION DENTAL 2 TIMES DAILY
Status: DISCONTINUED | OUTPATIENT
Start: 2024-04-21 | End: 2024-04-24

## 2024-04-21 RX ADMIN — EZETIMIBE 10 MG: 10 TABLET ORAL at 09:43

## 2024-04-21 RX ADMIN — CALCIUM GLUCONATE 1000 MG: 20 INJECTION, SOLUTION INTRAVENOUS at 17:19

## 2024-04-21 RX ADMIN — ASPIRIN 81 MG CHEWABLE TABLET 81 MG: 81 TABLET CHEWABLE at 09:43

## 2024-04-21 RX ADMIN — MIDODRINE HYDROCHLORIDE 10 MG: 10 TABLET ORAL at 20:58

## 2024-04-21 RX ADMIN — SODIUM CHLORIDE, PRESERVATIVE FREE 10 ML: 5 INJECTION INTRAVENOUS at 20:56

## 2024-04-21 RX ADMIN — HEPARIN SODIUM 5000 UNITS: 5000 INJECTION INTRAVENOUS; SUBCUTANEOUS at 06:44

## 2024-04-21 RX ADMIN — OXYCODONE AND ACETAMINOPHEN 1 TABLET: 5; 325 TABLET ORAL at 03:55

## 2024-04-21 RX ADMIN — INSULIN GLARGINE 15 UNITS: 100 INJECTION, SOLUTION SUBCUTANEOUS at 20:56

## 2024-04-21 RX ADMIN — METOPROLOL TARTRATE 5 MG: 5 INJECTION INTRAVENOUS at 16:14

## 2024-04-21 RX ADMIN — CHOLECALCIFEROL TAB 25 MCG (1000 UNIT) 2000 UNITS: 25 TAB at 09:43

## 2024-04-21 RX ADMIN — FOLIC ACID 1 MG: 1 TABLET ORAL at 09:43

## 2024-04-21 RX ADMIN — Medication 1 MG: at 09:43

## 2024-04-21 RX ADMIN — POLYVINYL ALCOHOL, POVIDONE 1 DROP: 14; 6 SOLUTION/ DROPS OPHTHALMIC at 22:29

## 2024-04-21 RX ADMIN — HEPARIN SODIUM 5000 UNITS: 5000 INJECTION INTRAVENOUS; SUBCUTANEOUS at 22:29

## 2024-04-21 RX ADMIN — PANTOPRAZOLE SODIUM 40 MG: 40 TABLET, DELAYED RELEASE ORAL at 09:42

## 2024-04-21 RX ADMIN — BUDESONIDE 1 MG: 1 SUSPENSION RESPIRATORY (INHALATION) at 08:11

## 2024-04-21 RX ADMIN — PROPOFOL 20 MCG/KG/MIN: 10 INJECTION, EMULSION INTRAVENOUS at 18:31

## 2024-04-21 RX ADMIN — SODIUM BICARBONATE 50 MEQ: 84 INJECTION INTRAVENOUS at 17:05

## 2024-04-21 RX ADMIN — NITROGLYCERIN 0.4 MG: 0.4 TABLET, ORALLY DISINTEGRATING SUBLINGUAL at 16:06

## 2024-04-21 RX ADMIN — BENZONATATE 100 MG: 100 CAPSULE ORAL at 14:37

## 2024-04-21 RX ADMIN — SEVELAMER CARBONATE 800 MG: 800 TABLET, FILM COATED ORAL at 09:44

## 2024-04-21 RX ADMIN — WATER 40 MG: 1 INJECTION INTRAMUSCULAR; INTRAVENOUS; SUBCUTANEOUS at 20:06

## 2024-04-21 RX ADMIN — CYANOCOBALAMIN TAB 1000 MCG 1000 MCG: 1000 TAB at 09:44

## 2024-04-21 RX ADMIN — ONDANSETRON 4 MG: 2 INJECTION INTRAMUSCULAR; INTRAVENOUS at 16:55

## 2024-04-21 RX ADMIN — DEXMEDETOMIDINE HYDROCHLORIDE 0.2 MCG/KG/HR: 4 INJECTION, SOLUTION INTRAVENOUS at 18:36

## 2024-04-21 RX ADMIN — PREDNISONE 40 MG: 20 TABLET ORAL at 09:42

## 2024-04-21 RX ADMIN — ATORVASTATIN CALCIUM 80 MG: 40 TABLET, FILM COATED ORAL at 20:56

## 2024-04-21 RX ADMIN — METOPROLOL TARTRATE 5 MG: 5 INJECTION INTRAVENOUS at 16:46

## 2024-04-21 RX ADMIN — 0.12% CHLORHEXIDINE GLUCONATE 15 ML: 1.2 RINSE ORAL at 20:56

## 2024-04-21 RX ADMIN — HEPARIN SODIUM 5000 UNITS: 5000 INJECTION INTRAVENOUS; SUBCUTANEOUS at 14:37

## 2024-04-21 RX ADMIN — IPRATROPIUM BROMIDE AND ALBUTEROL SULFATE 1 DOSE: .5; 3 SOLUTION RESPIRATORY (INHALATION) at 23:30

## 2024-04-21 RX ADMIN — BENZONATATE 100 MG: 100 CAPSULE ORAL at 09:43

## 2024-04-21 RX ADMIN — FUROSEMIDE 80 MG: 10 INJECTION, SOLUTION INTRAMUSCULAR; INTRAVENOUS at 16:24

## 2024-04-21 RX ADMIN — PIPERACILLIN AND TAZOBACTAM 4500 MG: 4; .5 INJECTION, POWDER, FOR SOLUTION INTRAVENOUS at 18:45

## 2024-04-21 RX ADMIN — SEVELAMER CARBONATE 800 MG: 800 TABLET, FILM COATED ORAL at 11:58

## 2024-04-21 RX ADMIN — IRON SUCROSE 200 MG: 20 INJECTION, SOLUTION INTRAVENOUS at 09:45

## 2024-04-21 RX ADMIN — CHLOROTHIAZIDE SODIUM 500 MG: 500 INJECTION, POWDER, LYOPHILIZED, FOR SOLUTION INTRAVENOUS at 20:06

## 2024-04-21 RX ADMIN — MIDODRINE HYDROCHLORIDE 10 MG: 10 TABLET ORAL at 09:43

## 2024-04-21 RX ADMIN — FENTANYL CITRATE 200 MCG/HR: at 23:09

## 2024-04-21 RX ADMIN — FENTANYL CITRATE 50 MCG/HR: at 17:54

## 2024-04-21 RX ADMIN — SODIUM CHLORIDE, PRESERVATIVE FREE 10 ML: 5 INJECTION INTRAVENOUS at 09:44

## 2024-04-21 RX ADMIN — GUAIFENESIN 600 MG: 600 TABLET, EXTENDED RELEASE ORAL at 09:42

## 2024-04-21 RX ADMIN — ARFORMOTEROL TARTRATE 15 MCG: 15 SOLUTION RESPIRATORY (INHALATION) at 20:17

## 2024-04-21 RX ADMIN — ALBUMIN (HUMAN) 25 G: 0.25 INJECTION, SOLUTION INTRAVENOUS at 20:13

## 2024-04-21 RX ADMIN — BUMETANIDE 2 MG: 0.25 INJECTION INTRAMUSCULAR; INTRAVENOUS at 19:01

## 2024-04-21 RX ADMIN — ARFORMOTEROL TARTRATE 15 MCG: 15 SOLUTION RESPIRATORY (INHALATION) at 08:11

## 2024-04-21 RX ADMIN — IPRATROPIUM BROMIDE AND ALBUTEROL SULFATE 1 DOSE: .5; 3 SOLUTION RESPIRATORY (INHALATION) at 20:17

## 2024-04-21 RX ADMIN — OXYCODONE AND ACETAMINOPHEN 1 TABLET: 5; 325 TABLET ORAL at 12:07

## 2024-04-21 RX ADMIN — BUDESONIDE 1 MG: 1 SUSPENSION RESPIRATORY (INHALATION) at 20:17

## 2024-04-21 RX ADMIN — GUAIFENESIN 600 MG: 600 TABLET, EXTENDED RELEASE ORAL at 20:56

## 2024-04-21 RX ADMIN — INSULIN HUMAN 10 UNITS: 100 INJECTION, SOLUTION PARENTERAL at 19:01

## 2024-04-21 RX ADMIN — IPRATROPIUM BROMIDE AND ALBUTEROL SULFATE 1 DOSE: .5; 3 SOLUTION RESPIRATORY (INHALATION) at 08:11

## 2024-04-21 RX ADMIN — SODIUM CHLORIDE, PRESERVATIVE FREE 40 MG: 5 INJECTION INTRAVENOUS at 20:05

## 2024-04-21 RX ADMIN — POLYVINYL ALCOHOL, POVIDONE 1 DROP: 14; 6 SOLUTION/ DROPS OPHTHALMIC at 21:35

## 2024-04-21 ASSESSMENT — PAIN DESCRIPTION - FREQUENCY
FREQUENCY: CONTINUOUS
FREQUENCY: CONTINUOUS

## 2024-04-21 ASSESSMENT — PAIN DESCRIPTION - DESCRIPTORS
DESCRIPTORS: STABBING
DESCRIPTORS: ACHING

## 2024-04-21 ASSESSMENT — PAIN SCALES - GENERAL
PAINLEVEL_OUTOF10: 10
PAINLEVEL_OUTOF10: 10
PAINLEVEL_OUTOF10: 2
PAINLEVEL_OUTOF10: 10
PAINLEVEL_OUTOF10: 4
PAINLEVEL_OUTOF10: 10
PAINLEVEL_OUTOF10: 0
PAINLEVEL_OUTOF10: 10

## 2024-04-21 ASSESSMENT — PAIN DESCRIPTION - ORIENTATION
ORIENTATION: LEFT

## 2024-04-21 ASSESSMENT — PAIN - FUNCTIONAL ASSESSMENT
PAIN_FUNCTIONAL_ASSESSMENT: ACTIVITIES ARE NOT PREVENTED

## 2024-04-21 ASSESSMENT — PAIN DESCRIPTION - LOCATION
LOCATION: CHEST
LOCATION: BACK;ARM
LOCATION: CHEST
LOCATION: BACK;ARM
LOCATION: CHEST
LOCATION: ARM
LOCATION: CHEST
LOCATION: ARM

## 2024-04-21 ASSESSMENT — PAIN DESCRIPTION - ONSET
ONSET: ON-GOING
ONSET: ON-GOING

## 2024-04-21 ASSESSMENT — PAIN DESCRIPTION - PAIN TYPE
TYPE: ACUTE PAIN
TYPE: ACUTE PAIN

## 2024-04-21 ASSESSMENT — PULMONARY FUNCTION TESTS
PIF_VALUE: 40
PIF_VALUE: 28
PIF_VALUE: 16

## 2024-04-21 NOTE — PROCEDURES
Pulmonary, Critical Care, and Sleep Medicine    Name: Christy Donahue MRN: [id number]   : 1950 Hospital: [facility]   Date: 2024        Intubation Note    Procedure: elective/emergent intubation    Indication: respiratory insufficiency    Anesthesia- Rapid sequence:  Etomidate 20mg  Paralysis: Rocuronium 50 mg     After assessing the airway, the patient underwent preoxygenation with 100% FiO2 for 5 min. Fentanyl was then given and after 3 min, etomidate and succnylcholine was given sequentially in rapid IV push.  The Sellick maneuver was performed throughout the entire sequence.  After adequate sedation and paralisys emergent intubation was performed.      An S 3 Lopro glidescope was used to visualize the epiglottis and vocal chords.    After positive identification of the vocal chords, an 7.5 ET tube was placed into the trachea with direct visualization.    The tube was seen passing through the vocal chords without difficulty.  CO2 colorimetry was employed immediately to verify tube in airway with /without appropriate color change indicating detection/lack of CO2.    Water vapor was seen within the ET tube, and auscultation of the abdomen revealed no bubbling souds.    Auscultation  and inspection of the chest after intubation showed symmetric chest excursion and symmetric air entry bilaterally.    Chest X-ray has been ordered and is pending.    The patient has been placed on a mechanical ventilator.    There were no complications.     Carleen Vazquez CRNA available during intubation     Iliana Mendoza PA-C  24  Pulmonary, Critical Care Medicine  [facility]

## 2024-04-21 NOTE — SIGNIFICANT EVENT
[facility]  Rapid/Medical Response Team Note      Patient:    Christy Donahue 73 y.o. female, : 1950  Patient MRN: [id number]    Admission Date: 2024   Admission Diagnosis: COPD exacerbation (HCC) [J44.1]  End stage renal disease on dialysis (HCC) [N18.6, Z99.2]  Acute respiratory failure with hypoxia (HCC) [J96.01]      RAPID RESPONSE  Rapid response called for: chest pain    Called to bedside. Chest pain began     OBJECTIVE    RRT/MRT start time:               RRT/MRT end time:  Vitals:    24 1600   BP: (!) 169/77   Pulse: (!) 117   Resp: 20   Temp: 99.2 °F (37.3 °C)   SpO2: 99%        Physical Exam on rapid start:  Gen: in significant discomfort, obese   HEENT: normocephalic, atraumatic,   CV: Tachycardic regular rhythm, S1/S2 present without M/R/G, +2 radial pulses, well-perfused, PMI not displaced  Pulm: mild wheezes, no crackles  Abd:distended, mild TTP  MSK: 1+ edema  Skin: warm, dry, intact, no rash  Neuro: CN II-XII grossly intact, no focal deficits appreciated   Psych: in significant distress, talking    Physical Exam on rapid end:  Gen: pt unconscious, obese   HEENT: normocephalic, atraumatic,   CV: Tachycardic regular rhythm, S1/S2 present without M/R/G, +2 radial pulses, extremities slightly cool, PMI not displaced  Pulm: wheezes  Abd: significantly distended  MSK: 1+ edema  Skin: warm, dry, intact, no rash  Neuro: CN II-XII grossly intact, no focal deficits appreciated         ASSESSMENT/PLAN AND DISPOSITION  Christy Donahue is a 73 y.o. year old female admitted for COPD exacerbation (HCC) [J44.1]  End stage renal disease on dialysis (HCC) [N18.6, Z99.2]  Acute respiratory failure with hypoxia (HCC) [J96.01]  Rapid Response Team/ Medical Response Team Called For: chest pain    In setting of acute respiratory distress and chest pain completed the below:    RRT called at 1552  Nitro sublingual 0.4mg at 1606  Metoprolol 5 mg IV at 1614    EKG obtained: some artifact, sinus tach with

## 2024-04-22 ENCOUNTER — APPOINTMENT (OUTPATIENT)
Facility: HOSPITAL | Age: 74
DRG: 208 | End: 2024-04-22
Payer: MEDICARE

## 2024-04-22 LAB
ANION GAP SERPL CALC-SCNC: 5 MMOL/L (ref 3–18)
ARTERIAL PATENCY WRIST A: POSITIVE
BASE DEFICIT BLD-SCNC: 0.2 MMOL/L
BASOPHILS # BLD: 0 K/UL (ref 0–0.1)
BASOPHILS NFR BLD: 0 % (ref 0–2)
BDY SITE: NORMAL
BUN SERPL-MCNC: 55 MG/DL (ref 7–18)
BUN/CREAT SERPL: 20 (ref 12–20)
CALCIUM SERPL-MCNC: 8.5 MG/DL (ref 8.5–10.1)
CHLORIDE SERPL-SCNC: 104 MMOL/L (ref 100–111)
CO2 SERPL-SCNC: 30 MMOL/L (ref 21–32)
CREAT SERPL-MCNC: 2.69 MG/DL (ref 0.6–1.3)
DIFFERENTIAL METHOD BLD: ABNORMAL
ECHO BSA: 1.85 M2
EKG ATRIAL RATE: 116 BPM
EKG ATRIAL RATE: 117 BPM
EKG ATRIAL RATE: 156 BPM
EKG ATRIAL RATE: 89 BPM
EKG DIAGNOSIS: NORMAL
EKG P AXIS: 65 DEGREES
EKG P AXIS: 66 DEGREES
EKG P-R INTERVAL: 120 MS
EKG P-R INTERVAL: 160 MS
EKG P-R INTERVAL: 162 MS
EKG Q-T INTERVAL: 290 MS
EKG Q-T INTERVAL: 314 MS
EKG Q-T INTERVAL: 338 MS
EKG Q-T INTERVAL: 352 MS
EKG QRS DURATION: 102 MS
EKG QRS DURATION: 104 MS
EKG QRS DURATION: 88 MS
EKG QRS DURATION: 88 MS
EKG QTC CALCULATION (BAZETT): 425 MS
EKG QTC CALCULATION (BAZETT): 438 MS
EKG QTC CALCULATION (BAZETT): 467 MS
EKG QTC CALCULATION (BAZETT): 469 MS
EKG R AXIS: 56 DEGREES
EKG R AXIS: 60 DEGREES
EKG T AXIS: 100 DEGREES
EKG T AXIS: 105 DEGREES
EKG T AXIS: 93 DEGREES
EKG VENTRICULAR RATE: 116 BPM
EKG VENTRICULAR RATE: 117 BPM
EKG VENTRICULAR RATE: 156 BPM
EKG VENTRICULAR RATE: 88 BPM
EOSINOPHIL # BLD: 0 K/UL (ref 0–0.4)
EOSINOPHIL NFR BLD: 0 % (ref 0–5)
ERYTHROCYTE [DISTWIDTH] IN BLOOD BY AUTOMATED COUNT: 17.9 % (ref 11.6–14.5)
GAS FLOW.O2 O2 DELIVERY SYS: NORMAL
GAS FLOW.O2 SETTING OXYMISER: 20 BPM
GLUCOSE BLD STRIP.AUTO-MCNC: 130 MG/DL (ref 70–110)
GLUCOSE BLD STRIP.AUTO-MCNC: 195 MG/DL (ref 70–110)
GLUCOSE BLD STRIP.AUTO-MCNC: 242 MG/DL (ref 70–110)
GLUCOSE SERPL-MCNC: 149 MG/DL (ref 74–99)
HCO3 BLD-SCNC: 25.1 MMOL/L (ref 22–26)
HCT VFR BLD AUTO: 25.7 % (ref 35–45)
HGB BLD-MCNC: 7.6 G/DL (ref 12–16)
IMM GRANULOCYTES # BLD AUTO: 0 K/UL (ref 0–0.04)
IMM GRANULOCYTES NFR BLD AUTO: 0 % (ref 0–0.5)
IPAP/PIP/HIGH PEEP: 31
LYMPHOCYTES # BLD: 0.4 K/UL (ref 0.9–3.6)
LYMPHOCYTES NFR BLD: 2 % (ref 21–52)
MAGNESIUM SERPL-MCNC: 2 MG/DL (ref 1.6–2.6)
MCH RBC QN AUTO: 30.2 PG (ref 24–34)
MCHC RBC AUTO-ENTMCNC: 29.6 G/DL (ref 31–37)
MCV RBC AUTO: 102 FL (ref 78–100)
METAMYELOCYTES NFR BLD MANUAL: 1 %
MONOCYTES # BLD: 0.4 K/UL (ref 0.05–1.2)
MONOCYTES NFR BLD: 2 % (ref 3–10)
MYELOCYTES NFR BLD MANUAL: 1 %
NEUTS BAND NFR BLD MANUAL: 2 %
NEUTS SEG # BLD: 17.2 K/UL (ref 1.8–8)
NEUTS SEG NFR BLD: 92 % (ref 40–73)
NRBC # BLD: 2.76 K/UL (ref 0–0.01)
NRBC BLD-RTO: 15.1 PER 100 WBC
O2/TOTAL GAS SETTING VFR VENT: 60 %
PAW @ MEAN EXP FLOW ON VENT: 20 CMH2O
PCO2 BLD: 43.2 MMHG (ref 35–45)
PEEP RESPIRATORY: 8 CMH2O
PH BLD: 7.37 (ref 7.35–7.45)
PHOSPHATE SERPL-MCNC: 3.8 MG/DL (ref 2.5–4.9)
PLATELET # BLD AUTO: 355 K/UL (ref 135–420)
PLATELET COMMENT: ABNORMAL
PMV BLD AUTO: 9.3 FL (ref 9.2–11.8)
PO2 BLD: 92 MMHG (ref 80–100)
POTASSIUM SERPL-SCNC: 4.8 MMOL/L (ref 3.5–5.5)
RBC # BLD AUTO: 2.52 M/UL (ref 4.2–5.3)
RBC MORPH BLD: ABNORMAL
RBC MORPH BLD: ABNORMAL
RESPIRATORY RATE, POC: 28 (ref 5–40)
SAO2 % BLD: 96.9 % (ref 92–97)
SERVICE CMNT-IMP: NORMAL
SODIUM SERPL-SCNC: 139 MMOL/L (ref 136–145)
SPECIMEN TYPE: NORMAL
VAS LEFT ARM GRAFT VOLUME FLOW: 69.6 ML/MIN
VAS LEFT ARTERIAL PROX ANASTOMOSIS AVF EDV: 105 CM/S
VAS LEFT ARTERIAL PROX ANASTOMOSIS AVF PSV: 289.1 CM/S
VAS LEFT AVF AVG DIAMETER 1: 1.01 CM
VAS LEFT AVF AVG DIAMETER 2: 0.97 CM
VAS LEFT AVF AVG DIAMETER 3: 0.91 CM
VAS LEFT AVF AVG DIST ANAST VOL FLOW: 3235 ML/MIN
VAS LEFT AVF AVG DIST OUTFLOW VOL FLOW: 1067 ML/MIN
VAS LEFT AVF AVG GRAFT NAME: NORMAL
VAS LEFT AVF AVG INFLOW VOL FLOW: 1153 ML/MIN
VAS LEFT AVF AVG MID OUTFLOW VOL FLOW: 673.4 ML/MIN
VAS LEFT AVF AVG OUTFLOW VESSEL NAME: NORMAL
VAS LEFT AVF AVG PROX ANAST VOL FLOW: 1323 ML/MIN
VAS LEFT AVF AVG PROX OUTFLOW VOL FLOW: 980 ML/MIN
VAS LEFT AVG AVF DEPTH 1: 0.35 CM
VAS LEFT AVG AVF DEPTH 2: 0.37 CM
VAS LEFT AVG AVF DEPTH 3: 0.4 CM
VAS LEFT DIST OUTFLOW AVF EDV: 38.8 CM/S
VAS LEFT DIST OUTFLOW AVF PSV: 83.8 CM/S
VAS LEFT INFLOW ARTERY AVF EDV: 93.9 CM/S
VAS LEFT INFLOW ARTERY AVF PSV: 225.9 CM/S
VAS LEFT MID OUTFLOW AVF EDV: 35.5 CM/S
VAS LEFT MID OUTFLOW AVF PSV: 72.8 CM/S
VAS LEFT OUTFLOW VESSEL AVF EDV: 19.7 CM/S
VAS LEFT OUTFLOW VESSEL AVF PSV: 71.3 CM/S
VAS LEFT PROX OUTFLOW AVF EDV: 36.6 CM/S
VAS LEFT PROX OUTFLOW AVF PSV: 104.7 CM/S
VAS LEFT VENOUS DIST ANASTOMOSIS AVF EDV: 420.6 CM/S
VAS LEFT VENOUS DIST ANASTOMOSIS AVF PSV: 958.7 CM/S
VENTILATION MODE VENT: NORMAL
VT SETTING VENT: 360 ML
WBC # BLD AUTO: 18.3 K/UL (ref 4.6–13.2)
WBC MORPH BLD: ABNORMAL

## 2024-04-22 PROCEDURE — 89220 SPUTUM SPECIMEN COLLECTION: CPT

## 2024-04-22 PROCEDURE — 6360000002 HC RX W HCPCS: Performed by: PHYSICIAN ASSISTANT

## 2024-04-22 PROCEDURE — 2500000003 HC RX 250 WO HCPCS: Performed by: NURSE PRACTITIONER

## 2024-04-22 PROCEDURE — P9047 ALBUMIN (HUMAN), 25%, 50ML: HCPCS

## 2024-04-22 PROCEDURE — 84100 ASSAY OF PHOSPHORUS: CPT

## 2024-04-22 PROCEDURE — 6360000002 HC RX W HCPCS

## 2024-04-22 PROCEDURE — 2000000000 HC ICU R&B

## 2024-04-22 PROCEDURE — 6370000000 HC RX 637 (ALT 250 FOR IP): Performed by: STUDENT IN AN ORGANIZED HEALTH CARE EDUCATION/TRAINING PROGRAM

## 2024-04-22 PROCEDURE — 2580000003 HC RX 258: Performed by: INTERNAL MEDICINE

## 2024-04-22 PROCEDURE — APPSS30 APP SPLIT SHARED TIME 16-30 MINUTES

## 2024-04-22 PROCEDURE — 83735 ASSAY OF MAGNESIUM: CPT

## 2024-04-22 PROCEDURE — 6370000000 HC RX 637 (ALT 250 FOR IP): Performed by: INTERNAL MEDICINE

## 2024-04-22 PROCEDURE — 94640 AIRWAY INHALATION TREATMENT: CPT

## 2024-04-22 PROCEDURE — 94003 VENT MGMT INPAT SUBQ DAY: CPT

## 2024-04-22 PROCEDURE — 87205 SMEAR GRAM STAIN: CPT

## 2024-04-22 PROCEDURE — 93990 DOPPLER FLOW TESTING: CPT | Performed by: SURGERY

## 2024-04-22 PROCEDURE — 2700000000 HC OXYGEN THERAPY PER DAY

## 2024-04-22 PROCEDURE — A4216 STERILE WATER/SALINE, 10 ML: HCPCS | Performed by: PHYSICIAN ASSISTANT

## 2024-04-22 PROCEDURE — 6360000002 HC RX W HCPCS: Performed by: INTERNAL MEDICINE

## 2024-04-22 PROCEDURE — 36600 WITHDRAWAL OF ARTERIAL BLOOD: CPT

## 2024-04-22 PROCEDURE — 82962 GLUCOSE BLOOD TEST: CPT

## 2024-04-22 PROCEDURE — 30233N1 TRANSFUSION OF NONAUTOLOGOUS RED BLOOD CELLS INTO PERIPHERAL VEIN, PERCUTANEOUS APPROACH: ICD-10-PCS | Performed by: STUDENT IN AN ORGANIZED HEALTH CARE EDUCATION/TRAINING PROGRAM

## 2024-04-22 PROCEDURE — 71045 X-RAY EXAM CHEST 1 VIEW: CPT

## 2024-04-22 PROCEDURE — 85025 COMPLETE CBC W/AUTO DIFF WBC: CPT

## 2024-04-22 PROCEDURE — 93005 ELECTROCARDIOGRAM TRACING: CPT | Performed by: REGISTERED NURSE

## 2024-04-22 PROCEDURE — P9047 ALBUMIN (HUMAN), 25%, 50ML: HCPCS | Performed by: INTERNAL MEDICINE

## 2024-04-22 PROCEDURE — 36415 COLL VENOUS BLD VENIPUNCTURE: CPT

## 2024-04-22 PROCEDURE — 93010 ELECTROCARDIOGRAM REPORT: CPT | Performed by: INTERNAL MEDICINE

## 2024-04-22 PROCEDURE — 82803 BLOOD GASES ANY COMBINATION: CPT

## 2024-04-22 PROCEDURE — 74018 RADEX ABDOMEN 1 VIEW: CPT

## 2024-04-22 PROCEDURE — 2500000003 HC RX 250 WO HCPCS: Performed by: REGISTERED NURSE

## 2024-04-22 PROCEDURE — 2500000003 HC RX 250 WO HCPCS: Performed by: INTERNAL MEDICINE

## 2024-04-22 PROCEDURE — C9113 INJ PANTOPRAZOLE SODIUM, VIA: HCPCS | Performed by: PHYSICIAN ASSISTANT

## 2024-04-22 PROCEDURE — 90935 HEMODIALYSIS ONE EVALUATION: CPT

## 2024-04-22 PROCEDURE — 5A1D80Z PERFORMANCE OF URINARY FILTRATION, PROLONGED INTERMITTENT, 6-18 HOURS PER DAY: ICD-10-PCS | Performed by: STUDENT IN AN ORGANIZED HEALTH CARE EDUCATION/TRAINING PROGRAM

## 2024-04-22 PROCEDURE — 6370000000 HC RX 637 (ALT 250 FOR IP): Performed by: NURSE PRACTITIONER

## 2024-04-22 PROCEDURE — 2500000003 HC RX 250 WO HCPCS

## 2024-04-22 PROCEDURE — 6370000000 HC RX 637 (ALT 250 FOR IP): Performed by: HOSPITALIST

## 2024-04-22 PROCEDURE — 94761 N-INVAS EAR/PLS OXIMETRY MLT: CPT

## 2024-04-22 PROCEDURE — 80048 BASIC METABOLIC PNL TOTAL CA: CPT

## 2024-04-22 PROCEDURE — 6370000000 HC RX 637 (ALT 250 FOR IP): Performed by: PHYSICIAN ASSISTANT

## 2024-04-22 PROCEDURE — 2580000003 HC RX 258: Performed by: STUDENT IN AN ORGANIZED HEALTH CARE EDUCATION/TRAINING PROGRAM

## 2024-04-22 PROCEDURE — 99291 CRITICAL CARE FIRST HOUR: CPT | Performed by: INTERNAL MEDICINE

## 2024-04-22 PROCEDURE — 2580000003 HC RX 258: Performed by: PHYSICIAN ASSISTANT

## 2024-04-22 PROCEDURE — 87070 CULTURE OTHR SPECIMN AEROBIC: CPT

## 2024-04-22 RX ORDER — ADENOSINE 3 MG/ML
12 INJECTION, SOLUTION INTRAVENOUS ONCE
Status: DISCONTINUED | OUTPATIENT
Start: 2024-04-22 | End: 2024-04-22

## 2024-04-22 RX ORDER — MIDAZOLAM HYDROCHLORIDE 2 MG/2ML
1 INJECTION, SOLUTION INTRAMUSCULAR; INTRAVENOUS
Status: DISCONTINUED | OUTPATIENT
Start: 2024-04-22 | End: 2024-04-24

## 2024-04-22 RX ORDER — INSULIN LISPRO 100 [IU]/ML
0-8 INJECTION, SOLUTION INTRAVENOUS; SUBCUTANEOUS EVERY 6 HOURS
Status: DISCONTINUED | OUTPATIENT
Start: 2024-04-23 | End: 2024-04-28

## 2024-04-22 RX ORDER — ALBUMIN (HUMAN) 12.5 G/50ML
25 SOLUTION INTRAVENOUS AS NEEDED
Status: DISCONTINUED | OUTPATIENT
Start: 2024-04-22 | End: 2024-05-07 | Stop reason: HOSPADM

## 2024-04-22 RX ORDER — NOREPINEPHRINE BITARTRATE 0.06 MG/ML
1-100 INJECTION, SOLUTION INTRAVENOUS
Status: DISCONTINUED | OUTPATIENT
Start: 2024-04-22 | End: 2024-04-24

## 2024-04-22 RX ORDER — NOREPINEPHRINE BITARTRATE 0.06 MG/ML
1-100 INJECTION, SOLUTION INTRAVENOUS
Status: DISCONTINUED | OUTPATIENT
Start: 2024-04-22 | End: 2024-04-22

## 2024-04-22 RX ORDER — ALBUMIN (HUMAN) 12.5 G/50ML
25 SOLUTION INTRAVENOUS ONCE
Status: COMPLETED | OUTPATIENT
Start: 2024-04-22 | End: 2024-04-22

## 2024-04-22 RX ORDER — MIDODRINE HYDROCHLORIDE 10 MG/1
10 TABLET ORAL 3 TIMES DAILY
Status: DISCONTINUED | OUTPATIENT
Start: 2024-04-22 | End: 2024-04-24

## 2024-04-22 RX ORDER — ADENOSINE 3 MG/ML
6 INJECTION, SOLUTION INTRAVENOUS ONCE
Status: COMPLETED | OUTPATIENT
Start: 2024-04-22 | End: 2024-04-22

## 2024-04-22 RX ORDER — MIDAZOLAM HYDROCHLORIDE 1 MG/ML
INJECTION INTRAMUSCULAR; INTRAVENOUS
Status: COMPLETED
Start: 2024-04-22 | End: 2024-04-22

## 2024-04-22 RX ORDER — ADENOSINE 3 MG/ML
INJECTION, SOLUTION INTRAVENOUS
Status: COMPLETED
Start: 2024-04-22 | End: 2024-04-22

## 2024-04-22 RX ORDER — MIDAZOLAM HYDROCHLORIDE 2 MG/2ML
2 INJECTION, SOLUTION INTRAMUSCULAR; INTRAVENOUS ONCE
Status: DISCONTINUED | OUTPATIENT
Start: 2024-04-22 | End: 2024-04-27

## 2024-04-22 RX ADMIN — SODIUM CHLORIDE, PRESERVATIVE FREE 10 ML: 5 INJECTION INTRAVENOUS at 21:27

## 2024-04-22 RX ADMIN — DEXMEDETOMIDINE HYDROCHLORIDE 0.8 MCG/KG/HR: 4 INJECTION, SOLUTION INTRAVENOUS at 22:16

## 2024-04-22 RX ADMIN — BUDESONIDE 1 MG: 1 SUSPENSION RESPIRATORY (INHALATION) at 09:25

## 2024-04-22 RX ADMIN — 0.12% CHLORHEXIDINE GLUCONATE 15 ML: 1.2 RINSE ORAL at 20:29

## 2024-04-22 RX ADMIN — ADENOSINE: 3 INJECTION, SOLUTION INTRAVENOUS at 18:09

## 2024-04-22 RX ADMIN — WATER 40 MG: 1 INJECTION INTRAMUSCULAR; INTRAVENOUS; SUBCUTANEOUS at 08:18

## 2024-04-22 RX ADMIN — CYANOCOBALAMIN TAB 1000 MCG 1000 MCG: 1000 TAB at 08:18

## 2024-04-22 RX ADMIN — DEXMEDETOMIDINE HYDROCHLORIDE 0.6 MCG/KG/HR: 4 INJECTION, SOLUTION INTRAVENOUS at 14:27

## 2024-04-22 RX ADMIN — INSULIN GLARGINE 15 UNITS: 100 INJECTION, SOLUTION SUBCUTANEOUS at 20:38

## 2024-04-22 RX ADMIN — INSULIN LISPRO 2 UNITS: 100 INJECTION, SOLUTION INTRAVENOUS; SUBCUTANEOUS at 19:27

## 2024-04-22 RX ADMIN — IPRATROPIUM BROMIDE AND ALBUTEROL SULFATE 1 DOSE: .5; 3 SOLUTION RESPIRATORY (INHALATION) at 20:15

## 2024-04-22 RX ADMIN — SODIUM CHLORIDE, PRESERVATIVE FREE 10 ML: 5 INJECTION INTRAVENOUS at 08:20

## 2024-04-22 RX ADMIN — IPRATROPIUM BROMIDE AND ALBUTEROL SULFATE 1 DOSE: .5; 3 SOLUTION RESPIRATORY (INHALATION) at 03:39

## 2024-04-22 RX ADMIN — POLYVINYL ALCOHOL, POVIDONE 1 DROP: 14; 6 SOLUTION/ DROPS OPHTHALMIC at 02:36

## 2024-04-22 RX ADMIN — ARFORMOTEROL TARTRATE 15 MCG: 15 SOLUTION RESPIRATORY (INHALATION) at 20:15

## 2024-04-22 RX ADMIN — AMIODARONE HYDROCHLORIDE 150 MG: 1.5 INJECTION, SOLUTION INTRAVENOUS at 18:12

## 2024-04-22 RX ADMIN — HEPARIN SODIUM 5000 UNITS: 5000 INJECTION INTRAVENOUS; SUBCUTANEOUS at 05:49

## 2024-04-22 RX ADMIN — DEXMEDETOMIDINE HYDROCHLORIDE 0.2 MCG/KG/HR: 4 INJECTION, SOLUTION INTRAVENOUS at 11:35

## 2024-04-22 RX ADMIN — IPRATROPIUM BROMIDE AND ALBUTEROL SULFATE 1 DOSE: .5; 3 SOLUTION RESPIRATORY (INHALATION) at 23:43

## 2024-04-22 RX ADMIN — ALLOPURINOL 100 MG: 100 TABLET ORAL at 21:32

## 2024-04-22 RX ADMIN — PIPERACILLIN SODIUM AND TAZOBACTAM SODIUM 3375 MG: 3; .375 INJECTION, POWDER, LYOPHILIZED, FOR SOLUTION INTRAVENOUS at 05:50

## 2024-04-22 RX ADMIN — IPRATROPIUM BROMIDE AND ALBUTEROL SULFATE 1 DOSE: .5; 3 SOLUTION RESPIRATORY (INHALATION) at 17:09

## 2024-04-22 RX ADMIN — POLYVINYL ALCOHOL, POVIDONE 1 DROP: 14; 6 SOLUTION/ DROPS OPHTHALMIC at 11:45

## 2024-04-22 RX ADMIN — HEPARIN SODIUM 5000 UNITS: 5000 INJECTION INTRAVENOUS; SUBCUTANEOUS at 14:30

## 2024-04-22 RX ADMIN — MIDODRINE HYDROCHLORIDE 10 MG: 10 TABLET ORAL at 20:02

## 2024-04-22 RX ADMIN — ARFORMOTEROL TARTRATE 15 MCG: 15 SOLUTION RESPIRATORY (INHALATION) at 09:25

## 2024-04-22 RX ADMIN — IPRATROPIUM BROMIDE AND ALBUTEROL SULFATE 1 DOSE: .5; 3 SOLUTION RESPIRATORY (INHALATION) at 13:39

## 2024-04-22 RX ADMIN — ALBUMIN (HUMAN) 25 G: 0.25 INJECTION, SOLUTION INTRAVENOUS at 09:32

## 2024-04-22 RX ADMIN — FENTANYL CITRATE 125 MCG/HR: at 05:13

## 2024-04-22 RX ADMIN — POLYVINYL ALCOHOL, POVIDONE 1 DROP: 14; 6 SOLUTION/ DROPS OPHTHALMIC at 20:29

## 2024-04-22 RX ADMIN — DEXMEDETOMIDINE HYDROCHLORIDE 0.8 MCG/KG/HR: 4 INJECTION, SOLUTION INTRAVENOUS at 18:08

## 2024-04-22 RX ADMIN — ALBUMIN (HUMAN) 25 G: 0.25 INJECTION, SOLUTION INTRAVENOUS at 18:50

## 2024-04-22 RX ADMIN — ASPIRIN 81 MG CHEWABLE TABLET 81 MG: 81 TABLET CHEWABLE at 08:16

## 2024-04-22 RX ADMIN — POLYVINYL ALCOHOL, POVIDONE 1 DROP: 14; 6 SOLUTION/ DROPS OPHTHALMIC at 16:13

## 2024-04-22 RX ADMIN — FENTANYL CITRATE 25 MCG/HR: at 18:07

## 2024-04-22 RX ADMIN — 0.12% CHLORHEXIDINE GLUCONATE 15 ML: 1.2 RINSE ORAL at 08:16

## 2024-04-22 RX ADMIN — ATORVASTATIN CALCIUM 80 MG: 40 TABLET, FILM COATED ORAL at 20:36

## 2024-04-22 RX ADMIN — HEPARIN SODIUM 5000 UNITS: 5000 INJECTION INTRAVENOUS; SUBCUTANEOUS at 21:32

## 2024-04-22 RX ADMIN — FOLIC ACID 1 MG: 1 TABLET ORAL at 08:17

## 2024-04-22 RX ADMIN — MIDAZOLAM 2 MG: 1 INJECTION INTRAMUSCULAR; INTRAVENOUS at 18:09

## 2024-04-22 RX ADMIN — EPOETIN ALFA-EPBX 6000 UNITS: 3000 INJECTION, SOLUTION INTRAVENOUS; SUBCUTANEOUS at 16:12

## 2024-04-22 RX ADMIN — ACETAMINOPHEN 325MG 650 MG: 325 TABLET ORAL at 20:29

## 2024-04-22 RX ADMIN — CHOLECALCIFEROL TAB 25 MCG (1000 UNIT) 2000 UNITS: 25 TAB at 08:16

## 2024-04-22 RX ADMIN — EZETIMIBE 10 MG: 10 TABLET ORAL at 08:16

## 2024-04-22 RX ADMIN — DEXMEDETOMIDINE HYDROCHLORIDE 0.4 MCG/KG/HR: 4 INJECTION, SOLUTION INTRAVENOUS at 12:53

## 2024-04-22 RX ADMIN — Medication 10 MCG/MIN: at 19:54

## 2024-04-22 RX ADMIN — AMIODARONE HYDROCHLORIDE 1 MG/MIN: 1.8 INJECTION, SOLUTION INTRAVENOUS at 18:22

## 2024-04-22 RX ADMIN — POLYVINYL ALCOHOL, POVIDONE 1 DROP: 14; 6 SOLUTION/ DROPS OPHTHALMIC at 08:16

## 2024-04-22 RX ADMIN — FENTANYL CITRATE 50 MCG: 50 INJECTION INTRAMUSCULAR; INTRAVENOUS at 17:52

## 2024-04-22 RX ADMIN — IPRATROPIUM BROMIDE AND ALBUTEROL SULFATE 1 DOSE: .5; 3 SOLUTION RESPIRATORY (INHALATION) at 09:24

## 2024-04-22 RX ADMIN — BUDESONIDE 1 MG: 1 SUSPENSION RESPIRATORY (INHALATION) at 20:15

## 2024-04-22 RX ADMIN — MIDODRINE HYDROCHLORIDE 10 MG: 10 TABLET ORAL at 08:16

## 2024-04-22 RX ADMIN — PIPERACILLIN SODIUM AND TAZOBACTAM SODIUM 3375 MG: 3; .375 INJECTION, POWDER, LYOPHILIZED, FOR SOLUTION INTRAVENOUS at 18:27

## 2024-04-22 RX ADMIN — SODIUM CHLORIDE, PRESERVATIVE FREE 40 MG: 5 INJECTION INTRAVENOUS at 08:18

## 2024-04-22 ASSESSMENT — PAIN SCALES - GENERAL
PAINLEVEL_OUTOF10: 0

## 2024-04-22 ASSESSMENT — PULMONARY FUNCTION TESTS
PIF_VALUE: 14
PIF_VALUE: 23
PIF_VALUE: 17
PIF_VALUE: 18
PIF_VALUE: 12
PIF_VALUE: 15

## 2024-04-22 NOTE — CARE COORDINATION
04/22/24 1145   /Social Work Whiteboard Notes   /Social Work Whiteboard Red: 4/22-[facility] and rehab auth pending-  TV     Tamia Villela, ANTHONY     [phone]

## 2024-04-22 NOTE — CARE COORDINATION
Auth has been approved for [facility].     Tamia Villela, MSW     [phone]

## 2024-04-23 ENCOUNTER — APPOINTMENT (OUTPATIENT)
Facility: HOSPITAL | Age: 74
DRG: 208 | End: 2024-04-23
Payer: MEDICARE

## 2024-04-23 LAB
ANION GAP SERPL CALC-SCNC: 8 MMOL/L (ref 3–18)
ARTERIAL PATENCY WRIST A: POSITIVE
BASE EXCESS BLD CALC-SCNC: 3 MMOL/L
BASOPHILS # BLD: 0 K/UL (ref 0–0.1)
BASOPHILS NFR BLD: 0 % (ref 0–2)
BDY SITE: ABNORMAL
BUN SERPL-MCNC: 44 MG/DL (ref 7–18)
BUN/CREAT SERPL: 13 (ref 12–20)
CALCIUM SERPL-MCNC: 9.1 MG/DL (ref 8.5–10.1)
CHLORIDE SERPL-SCNC: 99 MMOL/L (ref 100–111)
CO2 SERPL-SCNC: 26 MMOL/L (ref 21–32)
CREAT SERPL-MCNC: 3.28 MG/DL (ref 0.6–1.3)
DIFFERENTIAL METHOD BLD: ABNORMAL
EKG DIAGNOSIS: NORMAL
EKG Q-T INTERVAL: 236 MS
EKG QRS DURATION: 76 MS
EKG QTC CALCULATION (BAZETT): 430 MS
EKG R AXIS: 93 DEGREES
EKG T AXIS: 240 DEGREES
EKG VENTRICULAR RATE: 200 BPM
EOSINOPHIL # BLD: 0 K/UL (ref 0–0.4)
EOSINOPHIL NFR BLD: 0 % (ref 0–5)
ERYTHROCYTE [DISTWIDTH] IN BLOOD BY AUTOMATED COUNT: 18.8 % (ref 11.6–14.5)
GAS FLOW.O2 O2 DELIVERY SYS: ABNORMAL
GAS FLOW.O2 SETTING OXYMISER: 12 BPM
GLUCOSE BLD STRIP.AUTO-MCNC: 141 MG/DL (ref 70–110)
GLUCOSE BLD STRIP.AUTO-MCNC: 142 MG/DL (ref 70–110)
GLUCOSE BLD STRIP.AUTO-MCNC: 144 MG/DL (ref 70–110)
GLUCOSE BLD STRIP.AUTO-MCNC: 148 MG/DL (ref 70–110)
GLUCOSE BLD STRIP.AUTO-MCNC: 195 MG/DL (ref 70–110)
GLUCOSE SERPL-MCNC: 134 MG/DL (ref 74–99)
HCO3 BLD-SCNC: 27.4 MMOL/L (ref 22–26)
HCT VFR BLD AUTO: 18.5 % (ref 35–45)
HCT VFR BLD AUTO: 23.8 % (ref 35–45)
HGB BLD-MCNC: 5.7 G/DL (ref 12–16)
HGB BLD-MCNC: 7.3 G/DL (ref 12–16)
HISTORY CHECK: NORMAL
IMM GRANULOCYTES # BLD AUTO: 0.2 K/UL (ref 0–0.04)
IMM GRANULOCYTES NFR BLD AUTO: 2 % (ref 0–0.5)
IPAP/PIP/HIGH PEEP: 22
LACTATE SERPL-SCNC: 1.2 MMOL/L (ref 0.4–2)
LYMPHOCYTES # BLD: 0.7 K/UL (ref 0.9–3.6)
LYMPHOCYTES NFR BLD: 4 % (ref 21–52)
MAGNESIUM SERPL-MCNC: 2 MG/DL (ref 1.6–2.6)
MCH RBC QN AUTO: 31 PG (ref 24–34)
MCHC RBC AUTO-ENTMCNC: 30.8 G/DL (ref 31–37)
MCV RBC AUTO: 100.5 FL (ref 78–100)
MONOCYTES # BLD: 0.5 K/UL (ref 0.05–1.2)
MONOCYTES NFR BLD: 3 % (ref 3–10)
NEUTS SEG # BLD: 13.6 K/UL (ref 1.8–8)
NEUTS SEG NFR BLD: 91 % (ref 40–73)
NRBC # BLD: 0.57 K/UL (ref 0–0.01)
NRBC BLD-RTO: 3.8 PER 100 WBC
O2/TOTAL GAS SETTING VFR VENT: 35 %
PCO2 BLD: 40 MMHG (ref 35–45)
PEEP RESPIRATORY: 8 CMH2O
PH BLD: 7.44 (ref 7.35–7.45)
PHOSPHATE SERPL-MCNC: 4.9 MG/DL (ref 2.5–4.9)
PLATELET # BLD AUTO: 229 K/UL (ref 135–420)
PMV BLD AUTO: 10 FL (ref 9.2–11.8)
PO2 BLD: 79 MMHG (ref 80–100)
POTASSIUM SERPL-SCNC: 4.6 MMOL/L (ref 3.5–5.5)
RBC # BLD AUTO: 1.84 M/UL (ref 4.2–5.3)
RESPIRATORY RATE, POC: 18 (ref 5–40)
SAO2 % BLD: 96 % (ref 92–97)
SERVICE CMNT-IMP: ABNORMAL
SODIUM SERPL-SCNC: 133 MMOL/L (ref 136–145)
SPECIMEN TYPE: ABNORMAL
VENTILATION MODE VENT: ABNORMAL
VT SETTING VENT: 360 ML
WBC # BLD AUTO: 15 K/UL (ref 4.6–13.2)

## 2024-04-23 PROCEDURE — 2580000003 HC RX 258: Performed by: INTERNAL MEDICINE

## 2024-04-23 PROCEDURE — 94640 AIRWAY INHALATION TREATMENT: CPT

## 2024-04-23 PROCEDURE — 84100 ASSAY OF PHOSPHORUS: CPT

## 2024-04-23 PROCEDURE — 2500000003 HC RX 250 WO HCPCS: Performed by: INTERNAL MEDICINE

## 2024-04-23 PROCEDURE — 71045 X-RAY EXAM CHEST 1 VIEW: CPT

## 2024-04-23 PROCEDURE — 99291 CRITICAL CARE FIRST HOUR: CPT | Performed by: INTERNAL MEDICINE

## 2024-04-23 PROCEDURE — 86923 COMPATIBILITY TEST ELECTRIC: CPT

## 2024-04-23 PROCEDURE — 90935 HEMODIALYSIS ONE EVALUATION: CPT

## 2024-04-23 PROCEDURE — 6360000002 HC RX W HCPCS: Performed by: INTERNAL MEDICINE

## 2024-04-23 PROCEDURE — 80048 BASIC METABOLIC PNL TOTAL CA: CPT

## 2024-04-23 PROCEDURE — C9113 INJ PANTOPRAZOLE SODIUM, VIA: HCPCS | Performed by: PHYSICIAN ASSISTANT

## 2024-04-23 PROCEDURE — 2700000000 HC OXYGEN THERAPY PER DAY

## 2024-04-23 PROCEDURE — 0BH17EZ INSERTION OF ENDOTRACHEAL AIRWAY INTO TRACHEA, VIA NATURAL OR ARTIFICIAL OPENING: ICD-10-PCS | Performed by: STUDENT IN AN ORGANIZED HEALTH CARE EDUCATION/TRAINING PROGRAM

## 2024-04-23 PROCEDURE — 82803 BLOOD GASES ANY COMBINATION: CPT

## 2024-04-23 PROCEDURE — 94003 VENT MGMT INPAT SUBQ DAY: CPT

## 2024-04-23 PROCEDURE — 2500000003 HC RX 250 WO HCPCS: Performed by: REGISTERED NURSE

## 2024-04-23 PROCEDURE — 83605 ASSAY OF LACTIC ACID: CPT

## 2024-04-23 PROCEDURE — 86900 BLOOD TYPING SEROLOGIC ABO: CPT

## 2024-04-23 PROCEDURE — APPSS15 APP SPLIT SHARED TIME 0-15 MINUTES: Performed by: NURSE PRACTITIONER

## 2024-04-23 PROCEDURE — 2580000003 HC RX 258: Performed by: PHYSICIAN ASSISTANT

## 2024-04-23 PROCEDURE — 6370000000 HC RX 637 (ALT 250 FOR IP): Performed by: STUDENT IN AN ORGANIZED HEALTH CARE EDUCATION/TRAINING PROGRAM

## 2024-04-23 PROCEDURE — 36415 COLL VENOUS BLD VENIPUNCTURE: CPT

## 2024-04-23 PROCEDURE — 85025 COMPLETE CBC W/AUTO DIFF WBC: CPT

## 2024-04-23 PROCEDURE — A4216 STERILE WATER/SALINE, 10 ML: HCPCS | Performed by: PHYSICIAN ASSISTANT

## 2024-04-23 PROCEDURE — 6370000000 HC RX 637 (ALT 250 FOR IP): Performed by: PHYSICIAN ASSISTANT

## 2024-04-23 PROCEDURE — 85018 HEMOGLOBIN: CPT

## 2024-04-23 PROCEDURE — 6370000000 HC RX 637 (ALT 250 FOR IP): Performed by: NURSE PRACTITIONER

## 2024-04-23 PROCEDURE — 6370000000 HC RX 637 (ALT 250 FOR IP): Performed by: HOSPITALIST

## 2024-04-23 PROCEDURE — 6360000002 HC RX W HCPCS: Performed by: PHYSICIAN ASSISTANT

## 2024-04-23 PROCEDURE — 2000000000 HC ICU R&B

## 2024-04-23 PROCEDURE — 83735 ASSAY OF MAGNESIUM: CPT

## 2024-04-23 PROCEDURE — 94761 N-INVAS EAR/PLS OXIMETRY MLT: CPT

## 2024-04-23 PROCEDURE — 0D9670Z DRAINAGE OF STOMACH WITH DRAINAGE DEVICE, VIA NATURAL OR ARTIFICIAL OPENING: ICD-10-PCS | Performed by: STUDENT IN AN ORGANIZED HEALTH CARE EDUCATION/TRAINING PROGRAM

## 2024-04-23 PROCEDURE — 36430 TRANSFUSION BLD/BLD COMPNT: CPT

## 2024-04-23 PROCEDURE — P9016 RBC LEUKOCYTES REDUCED: HCPCS

## 2024-04-23 PROCEDURE — 2580000003 HC RX 258: Performed by: STUDENT IN AN ORGANIZED HEALTH CARE EDUCATION/TRAINING PROGRAM

## 2024-04-23 PROCEDURE — 36600 WITHDRAWAL OF ARTERIAL BLOOD: CPT

## 2024-04-23 PROCEDURE — 86901 BLOOD TYPING SEROLOGIC RH(D): CPT

## 2024-04-23 PROCEDURE — 93010 ELECTROCARDIOGRAM REPORT: CPT | Performed by: INTERNAL MEDICINE

## 2024-04-23 PROCEDURE — 74018 RADEX ABDOMEN 1 VIEW: CPT

## 2024-04-23 PROCEDURE — 86850 RBC ANTIBODY SCREEN: CPT

## 2024-04-23 PROCEDURE — 85014 HEMATOCRIT: CPT

## 2024-04-23 RX ORDER — SODIUM CHLORIDE 9 MG/ML
INJECTION, SOLUTION INTRAVENOUS PRN
Status: DISCONTINUED | OUTPATIENT
Start: 2024-04-23 | End: 2024-05-07 | Stop reason: HOSPADM

## 2024-04-23 RX ADMIN — WATER 40 MG: 1 INJECTION INTRAMUSCULAR; INTRAVENOUS; SUBCUTANEOUS at 09:08

## 2024-04-23 RX ADMIN — IPRATROPIUM BROMIDE AND ALBUTEROL SULFATE 1 DOSE: .5; 3 SOLUTION RESPIRATORY (INHALATION) at 19:35

## 2024-04-23 RX ADMIN — INSULIN GLARGINE 15 UNITS: 100 INJECTION, SOLUTION SUBCUTANEOUS at 21:09

## 2024-04-23 RX ADMIN — POLYVINYL ALCOHOL, POVIDONE 1 DROP: 14; 6 SOLUTION/ DROPS OPHTHALMIC at 14:34

## 2024-04-23 RX ADMIN — SODIUM CHLORIDE, PRESERVATIVE FREE 40 MG: 5 INJECTION INTRAVENOUS at 09:07

## 2024-04-23 RX ADMIN — POLYVINYL ALCOHOL, POVIDONE 1 DROP: 14; 6 SOLUTION/ DROPS OPHTHALMIC at 11:12

## 2024-04-23 RX ADMIN — POLYVINYL ALCOHOL, POVIDONE 1 DROP: 14; 6 SOLUTION/ DROPS OPHTHALMIC at 23:20

## 2024-04-23 RX ADMIN — MIDODRINE HYDROCHLORIDE 10 MG: 10 TABLET ORAL at 09:17

## 2024-04-23 RX ADMIN — SODIUM CHLORIDE, PRESERVATIVE FREE 10 ML: 5 INJECTION INTRAVENOUS at 08:40

## 2024-04-23 RX ADMIN — SODIUM CHLORIDE, PRESERVATIVE FREE 10 ML: 5 INJECTION INTRAVENOUS at 21:10

## 2024-04-23 RX ADMIN — IPRATROPIUM BROMIDE AND ALBUTEROL SULFATE 1 DOSE: .5; 3 SOLUTION RESPIRATORY (INHALATION) at 08:15

## 2024-04-23 RX ADMIN — CHOLECALCIFEROL TAB 25 MCG (1000 UNIT) 2000 UNITS: 25 TAB at 09:18

## 2024-04-23 RX ADMIN — POLYVINYL ALCOHOL, POVIDONE 1 DROP: 14; 6 SOLUTION/ DROPS OPHTHALMIC at 08:39

## 2024-04-23 RX ADMIN — IPRATROPIUM BROMIDE AND ALBUTEROL SULFATE 1 DOSE: .5; 3 SOLUTION RESPIRATORY (INHALATION) at 16:11

## 2024-04-23 RX ADMIN — 0.12% CHLORHEXIDINE GLUCONATE 15 ML: 1.2 RINSE ORAL at 09:18

## 2024-04-23 RX ADMIN — IPRATROPIUM BROMIDE AND ALBUTEROL SULFATE 1 DOSE: .5; 3 SOLUTION RESPIRATORY (INHALATION) at 23:23

## 2024-04-23 RX ADMIN — FOLIC ACID 1 MG: 1 TABLET ORAL at 09:18

## 2024-04-23 RX ADMIN — PIPERACILLIN SODIUM AND TAZOBACTAM SODIUM 3375 MG: 3; .375 INJECTION, POWDER, LYOPHILIZED, FOR SOLUTION INTRAVENOUS at 06:14

## 2024-04-23 RX ADMIN — DEXMEDETOMIDINE HYDROCHLORIDE 1.5 MCG/KG/HR: 4 INJECTION, SOLUTION INTRAVENOUS at 09:35

## 2024-04-23 RX ADMIN — 0.12% CHLORHEXIDINE GLUCONATE 15 ML: 1.2 RINSE ORAL at 21:09

## 2024-04-23 RX ADMIN — DEXMEDETOMIDINE HYDROCHLORIDE 1.5 MCG/KG/HR: 4 INJECTION, SOLUTION INTRAVENOUS at 09:15

## 2024-04-23 RX ADMIN — POLYVINYL ALCOHOL, POVIDONE 1 DROP: 14; 6 SOLUTION/ DROPS OPHTHALMIC at 00:12

## 2024-04-23 RX ADMIN — DEXMEDETOMIDINE HYDROCHLORIDE 0.8 MCG/KG/HR: 4 INJECTION, SOLUTION INTRAVENOUS at 04:01

## 2024-04-23 RX ADMIN — AMIODARONE HYDROCHLORIDE 1 MG/MIN: 1.8 INJECTION, SOLUTION INTRAVENOUS at 00:12

## 2024-04-23 RX ADMIN — ASPIRIN 81 MG CHEWABLE TABLET 81 MG: 81 TABLET CHEWABLE at 09:17

## 2024-04-23 RX ADMIN — MIDODRINE HYDROCHLORIDE 10 MG: 10 TABLET ORAL at 14:37

## 2024-04-23 RX ADMIN — EZETIMIBE 10 MG: 10 TABLET ORAL at 09:17

## 2024-04-23 RX ADMIN — HEPARIN SODIUM 5000 UNITS: 5000 INJECTION INTRAVENOUS; SUBCUTANEOUS at 22:26

## 2024-04-23 RX ADMIN — BUDESONIDE 1 MG: 1 SUSPENSION RESPIRATORY (INHALATION) at 08:15

## 2024-04-23 RX ADMIN — IPRATROPIUM BROMIDE AND ALBUTEROL SULFATE 1 DOSE: .5; 3 SOLUTION RESPIRATORY (INHALATION) at 03:22

## 2024-04-23 RX ADMIN — HEPARIN SODIUM 5000 UNITS: 5000 INJECTION INTRAVENOUS; SUBCUTANEOUS at 14:30

## 2024-04-23 RX ADMIN — ATORVASTATIN CALCIUM 80 MG: 40 TABLET, FILM COATED ORAL at 21:09

## 2024-04-23 RX ADMIN — WATER 40 MG: 1 INJECTION INTRAMUSCULAR; INTRAVENOUS; SUBCUTANEOUS at 19:01

## 2024-04-23 RX ADMIN — IPRATROPIUM BROMIDE AND ALBUTEROL SULFATE 1 DOSE: .5; 3 SOLUTION RESPIRATORY (INHALATION) at 11:56

## 2024-04-23 RX ADMIN — CYANOCOBALAMIN TAB 1000 MCG 1000 MCG: 1000 TAB at 09:18

## 2024-04-23 RX ADMIN — ARFORMOTEROL TARTRATE 15 MCG: 15 SOLUTION RESPIRATORY (INHALATION) at 19:35

## 2024-04-23 RX ADMIN — POLYVINYL ALCOHOL, POVIDONE 1 DROP: 14; 6 SOLUTION/ DROPS OPHTHALMIC at 04:01

## 2024-04-23 RX ADMIN — AMIODARONE HYDROCHLORIDE 0.5 MG/MIN: 1.8 INJECTION, SOLUTION INTRAVENOUS at 23:19

## 2024-04-23 RX ADMIN — DEXMEDETOMIDINE HYDROCHLORIDE 0.6 MCG/KG/HR: 4 INJECTION, SOLUTION INTRAVENOUS at 11:35

## 2024-04-23 RX ADMIN — DEXMEDETOMIDINE HYDROCHLORIDE 0.2 MCG/KG/HR: 4 INJECTION, SOLUTION INTRAVENOUS at 22:26

## 2024-04-23 RX ADMIN — ARFORMOTEROL TARTRATE 15 MCG: 15 SOLUTION RESPIRATORY (INHALATION) at 08:15

## 2024-04-23 RX ADMIN — BUDESONIDE 1 MG: 1 SUSPENSION RESPIRATORY (INHALATION) at 19:35

## 2024-04-23 RX ADMIN — POLYVINYL ALCOHOL, POVIDONE 1 DROP: 14; 6 SOLUTION/ DROPS OPHTHALMIC at 19:01

## 2024-04-23 RX ADMIN — PIPERACILLIN SODIUM AND TAZOBACTAM SODIUM 3375 MG: 3; .375 INJECTION, POWDER, LYOPHILIZED, FOR SOLUTION INTRAVENOUS at 19:00

## 2024-04-23 ASSESSMENT — PAIN SCALES - GENERAL
PAINLEVEL_OUTOF10: 0

## 2024-04-23 ASSESSMENT — PULMONARY FUNCTION TESTS
PIF_VALUE: 14
PIF_VALUE: 8.9
PEFR_L/MIN: 100
PIF_VALUE: 13
PIF_VALUE: 18
PEFR_L/MIN: 98
PIF_VALUE: 15

## 2024-04-23 NOTE — DIALYSIS
HD Care plan  Time: 3 hrs  Dialysate:  2 K+   2.5 Ca++  Bath  Net UF: 2 L  Access: Aseptically care for MELL  AVF  Hemodynamic stability: Maintain BP WNL     Pre Dialysis:  Pt received from ICU Nurse Rob Hackett , pt on a bed, Alert opening eyes spontaneously, nonverbal , orally intubated on mechanical ventilation, no s/s of acute distress noted. Afib noted on Amiodarone drip @ 0.5mg/min MELL AVF assessed, no abnormalities noted, bruit and thrill strong, AVF accessed using 15 G needles without any difficulty. Good flows achieved from both needles.    Intra Dialysis:  Time out / safety process performed per policy, Tx initiated at 1445.    AVF/AVG flowing with ease. For hemodynamic stability UF goal set at 2000 ml as tolerated.  Pt offered assistance with repositioning every 2 hours/prn    Vascular access visible and line connections remained intact throughout entire duration of treatment.   Vital signs checked every 15 mins.     Post Dialysis:  Tx completed at 1745,   Tolerated well , 2 L  removed. De-accessed per protocol.    Clot time 8 minutes for arterial, and 8 minutes for venous.   Dry dressings applied.  Bruit/Thrill present above and below dressings.  Post Dialysis report given to ICU Nurse Rob

## 2024-04-23 NOTE — RT PROTOCOL NOTE
DEVORA           @BSHSILASTIMGCAT([id number])@    Lab Test:  Date:4/23/2024  WBC:   Lab Results   Component Value Date/Time    WBC 15.0 04/23/2024 03:52 AM   HGB:   Lab Results   Component Value Date/Time    HGB 7.3 04/23/2024 12:29 PM    PLTS:   Lab Results   Component Value Date/Time     04/23/2024 03:52 AM         Vital Signs:   Patient Vitals for the past 8 hrs:   Temp Pulse Resp BP SpO2   04/23/24 1615 -- 72 19 (!) 131/57 98 %   04/23/24 1613 -- 71 19 -- 98 %   04/23/24 1600 98.1 °F (36.7 °C) 69 18 129/60 98 %   04/23/24 1545 -- 70 20 (!) 139/44 100 %   04/23/24 1530 -- 72 19 (!) 125/55 98 %   04/23/24 1515 -- 71 18 (!) 118/59 99 %   04/23/24 1500 -- 72 19 (!) 120/55 98 %   04/23/24 1445 -- 70 16 119/73 98 %   04/23/24 1430 -- 69 16 (!) 135/59 98 %   04/23/24 1400 -- 72 18 139/61 98 %   04/23/24 1330 -- 71 19 (!) 132/50 98 %   04/23/24 1300 -- 72 19 (!) 129/50 97 %   04/23/24 1245 98.4 °F (36.9 °C) 73 19 (!) 134/53 96 %   04/23/24 1230 -- 77 21 129/61 96 %   04/23/24 1200 98.1 °F (36.7 °C) 73 23 132/60 100 %   04/23/24 1156 -- 74 23 -- 100 %   04/23/24 1149 -- 72 20 -- 94 %   04/23/24 1145 -- 75 24 125/65 94 %   04/23/24 1130 -- 71 21 132/61 93 %   04/23/24 1115 -- 83 23 114/62 93 %   04/23/24 1100 -- -- -- 119/81 --   04/23/24 0900 97.7 °F (36.5 °C) 76 20 (!) 106/51 97 %   04/23/24 0829 97.7 °F (36.5 °C) 76 19 (!) 110/44 97 %

## 2024-04-23 NOTE — CARE COORDINATION
SW placed call to Maira [phone] with [facility]. Per Maira, pts auth expires tonight.Maira with access to ts chart and stated she will continue to follow. Maira sated that she is unable to hold the bed however, can follow up on ed availability and re-initiating auth once pt is closer to discharge.     Disposition regarding transferring out of the ICU  RED/ Pt currently on a vent/ HD today       EULALIO will continue to follow up.    ANTHONY Ponce  Case Management Department

## 2024-04-24 ENCOUNTER — APPOINTMENT (OUTPATIENT)
Facility: HOSPITAL | Age: 74
DRG: 208 | End: 2024-04-24
Payer: MEDICARE

## 2024-04-24 PROBLEM — Z99.2 END STAGE RENAL DISEASE ON DIALYSIS (HCC): Status: ACTIVE | Noted: 2024-04-06

## 2024-04-24 LAB
ANION GAP SERPL CALC-SCNC: 12 MMOL/L (ref 3–18)
ARTERIAL PATENCY WRIST A: POSITIVE
BACTERIA SPEC CULT: NORMAL
BASE DEFICIT BLD-SCNC: 0.5 MMOL/L
BASOPHILS # BLD: 0 K/UL (ref 0–0.1)
BASOPHILS NFR BLD: 0 % (ref 0–2)
BDY SITE: ABNORMAL
BODY TEMPERATURE: 98
BUN SERPL-MCNC: 38 MG/DL (ref 7–18)
BUN/CREAT SERPL: 13 (ref 12–20)
CALCIUM SERPL-MCNC: 9.1 MG/DL (ref 8.5–10.1)
CHLORIDE SERPL-SCNC: 94 MMOL/L (ref 100–111)
CO2 SERPL-SCNC: 25 MMOL/L (ref 21–32)
CREAT SERPL-MCNC: 2.92 MG/DL (ref 0.6–1.3)
DIFFERENTIAL METHOD BLD: ABNORMAL
EOSINOPHIL # BLD: 0 K/UL (ref 0–0.4)
EOSINOPHIL NFR BLD: 0 % (ref 0–5)
ERYTHROCYTE [DISTWIDTH] IN BLOOD BY AUTOMATED COUNT: 19.8 % (ref 11.6–14.5)
GAS FLOW.O2 O2 DELIVERY SYS: ABNORMAL
GAS FLOW.O2 SETTING OXYMISER: 12 BPM
GLUCOSE BLD STRIP.AUTO-MCNC: 130 MG/DL (ref 70–110)
GLUCOSE BLD STRIP.AUTO-MCNC: 143 MG/DL (ref 70–110)
GLUCOSE BLD STRIP.AUTO-MCNC: 175 MG/DL (ref 70–110)
GLUCOSE BLD STRIP.AUTO-MCNC: 182 MG/DL (ref 70–110)
GLUCOSE SERPL-MCNC: 185 MG/DL (ref 74–99)
GRAM STN SPEC: NORMAL
HCO3 BLD-SCNC: 23.2 MMOL/L (ref 22–26)
HCT VFR BLD AUTO: 22.7 % (ref 35–45)
HGB BLD-MCNC: 7.3 G/DL (ref 12–16)
IMM GRANULOCYTES # BLD AUTO: 0.1 K/UL (ref 0–0.04)
IMM GRANULOCYTES NFR BLD AUTO: 1 % (ref 0–0.5)
LYMPHOCYTES # BLD: 0.1 K/UL (ref 0.9–3.6)
LYMPHOCYTES NFR BLD: 1 % (ref 21–52)
MAGNESIUM SERPL-MCNC: 2.2 MG/DL (ref 1.6–2.6)
MCH RBC QN AUTO: 29.6 PG (ref 24–34)
MCHC RBC AUTO-ENTMCNC: 32.2 G/DL (ref 31–37)
MCV RBC AUTO: 91.9 FL (ref 78–100)
MONOCYTES # BLD: 0.3 K/UL (ref 0.05–1.2)
MONOCYTES NFR BLD: 2 % (ref 3–10)
NEUTS SEG # BLD: 12.2 K/UL (ref 1.8–8)
NEUTS SEG NFR BLD: 96 % (ref 40–73)
NRBC # BLD: 0.43 K/UL (ref 0–0.01)
NRBC BLD-RTO: 3.4 PER 100 WBC
O2/TOTAL GAS SETTING VFR VENT: 30 %
PCO2 BLD: 32.3 MMHG (ref 35–45)
PEEP RESPIRATORY: 5 CMH2O
PH BLD: 7.46 (ref 7.35–7.45)
PHOSPHATE SERPL-MCNC: 5.7 MG/DL (ref 2.5–4.9)
PLATELET # BLD AUTO: 200 K/UL (ref 135–420)
PMV BLD AUTO: 9.6 FL (ref 9.2–11.8)
PO2 BLD: 92 MMHG (ref 80–100)
POTASSIUM SERPL-SCNC: 4.1 MMOL/L (ref 3.5–5.5)
RBC # BLD AUTO: 2.47 M/UL (ref 4.2–5.3)
SAO2 % BLD: 97.8 % (ref 92–97)
SERVICE CMNT-IMP: ABNORMAL
SERVICE CMNT-IMP: NORMAL
SERVICE CMNT-IMP: NORMAL
SODIUM SERPL-SCNC: 131 MMOL/L (ref 136–145)
SPECIMEN TYPE: ABNORMAL
VENTILATION MODE VENT: ABNORMAL
VT SETTING VENT: 360 ML
WBC # BLD AUTO: 12.7 K/UL (ref 4.6–13.2)

## 2024-04-24 PROCEDURE — 99291 CRITICAL CARE FIRST HOUR: CPT | Performed by: INTERNAL MEDICINE

## 2024-04-24 PROCEDURE — 6370000000 HC RX 637 (ALT 250 FOR IP): Performed by: STUDENT IN AN ORGANIZED HEALTH CARE EDUCATION/TRAINING PROGRAM

## 2024-04-24 PROCEDURE — 6360000002 HC RX W HCPCS: Performed by: INTERNAL MEDICINE

## 2024-04-24 PROCEDURE — 82803 BLOOD GASES ANY COMBINATION: CPT

## 2024-04-24 PROCEDURE — 74176 CT ABD & PELVIS W/O CONTRAST: CPT

## 2024-04-24 PROCEDURE — 2500000003 HC RX 250 WO HCPCS: Performed by: REGISTERED NURSE

## 2024-04-24 PROCEDURE — 74018 RADEX ABDOMEN 1 VIEW: CPT

## 2024-04-24 PROCEDURE — 83735 ASSAY OF MAGNESIUM: CPT

## 2024-04-24 PROCEDURE — 93970 EXTREMITY STUDY: CPT

## 2024-04-24 PROCEDURE — 6370000000 HC RX 637 (ALT 250 FOR IP): Performed by: PHYSICIAN ASSISTANT

## 2024-04-24 PROCEDURE — 2580000003 HC RX 258: Performed by: STUDENT IN AN ORGANIZED HEALTH CARE EDUCATION/TRAINING PROGRAM

## 2024-04-24 PROCEDURE — 94761 N-INVAS EAR/PLS OXIMETRY MLT: CPT

## 2024-04-24 PROCEDURE — 71045 X-RAY EXAM CHEST 1 VIEW: CPT

## 2024-04-24 PROCEDURE — 85025 COMPLETE CBC W/AUTO DIFF WBC: CPT

## 2024-04-24 PROCEDURE — 36600 WITHDRAWAL OF ARTERIAL BLOOD: CPT

## 2024-04-24 PROCEDURE — 82962 GLUCOSE BLOOD TEST: CPT

## 2024-04-24 PROCEDURE — 2500000003 HC RX 250 WO HCPCS: Performed by: INTERNAL MEDICINE

## 2024-04-24 PROCEDURE — 80048 BASIC METABOLIC PNL TOTAL CA: CPT

## 2024-04-24 PROCEDURE — 2580000003 HC RX 258: Performed by: PHYSICIAN ASSISTANT

## 2024-04-24 PROCEDURE — 84100 ASSAY OF PHOSPHORUS: CPT

## 2024-04-24 PROCEDURE — 2000000000 HC ICU R&B

## 2024-04-24 PROCEDURE — 2700000000 HC OXYGEN THERAPY PER DAY

## 2024-04-24 PROCEDURE — 36415 COLL VENOUS BLD VENIPUNCTURE: CPT

## 2024-04-24 PROCEDURE — A4216 STERILE WATER/SALINE, 10 ML: HCPCS | Performed by: PHYSICIAN ASSISTANT

## 2024-04-24 PROCEDURE — C9113 INJ PANTOPRAZOLE SODIUM, VIA: HCPCS | Performed by: PHYSICIAN ASSISTANT

## 2024-04-24 PROCEDURE — 90935 HEMODIALYSIS ONE EVALUATION: CPT

## 2024-04-24 PROCEDURE — APPSS15 APP SPLIT SHARED TIME 0-15 MINUTES: Performed by: NURSE PRACTITIONER

## 2024-04-24 PROCEDURE — 94640 AIRWAY INHALATION TREATMENT: CPT

## 2024-04-24 PROCEDURE — 94003 VENT MGMT INPAT SUBQ DAY: CPT

## 2024-04-24 PROCEDURE — 6370000000 HC RX 637 (ALT 250 FOR IP): Performed by: HOSPITALIST

## 2024-04-24 PROCEDURE — 6360000002 HC RX W HCPCS: Performed by: PHYSICIAN ASSISTANT

## 2024-04-24 PROCEDURE — 6370000000 HC RX 637 (ALT 250 FOR IP): Performed by: NURSE PRACTITIONER

## 2024-04-24 PROCEDURE — 2580000003 HC RX 258: Performed by: INTERNAL MEDICINE

## 2024-04-24 RX ORDER — MIDODRINE HYDROCHLORIDE 5 MG/1
5 TABLET ORAL 3 TIMES DAILY
Status: DISCONTINUED | OUTPATIENT
Start: 2024-04-25 | End: 2024-04-24

## 2024-04-24 RX ORDER — MIDODRINE HYDROCHLORIDE 5 MG/1
5 TABLET ORAL 3 TIMES DAILY
Status: DISCONTINUED | OUTPATIENT
Start: 2024-04-24 | End: 2024-05-07 | Stop reason: HOSPADM

## 2024-04-24 RX ADMIN — IPRATROPIUM BROMIDE AND ALBUTEROL SULFATE 1 DOSE: .5; 3 SOLUTION RESPIRATORY (INHALATION) at 07:27

## 2024-04-24 RX ADMIN — ATORVASTATIN CALCIUM 80 MG: 40 TABLET, FILM COATED ORAL at 21:45

## 2024-04-24 RX ADMIN — ARFORMOTEROL TARTRATE 15 MCG: 15 SOLUTION RESPIRATORY (INHALATION) at 19:24

## 2024-04-24 RX ADMIN — SODIUM CHLORIDE, PRESERVATIVE FREE 10 ML: 5 INJECTION INTRAVENOUS at 22:13

## 2024-04-24 RX ADMIN — HEPARIN SODIUM 5000 UNITS: 5000 INJECTION INTRAVENOUS; SUBCUTANEOUS at 21:45

## 2024-04-24 RX ADMIN — IPRATROPIUM BROMIDE AND ALBUTEROL SULFATE 1 DOSE: .5; 3 SOLUTION RESPIRATORY (INHALATION) at 03:26

## 2024-04-24 RX ADMIN — IPRATROPIUM BROMIDE AND ALBUTEROL SULFATE 1 DOSE: .5; 3 SOLUTION RESPIRATORY (INHALATION) at 11:52

## 2024-04-24 RX ADMIN — POLYVINYL ALCOHOL, POVIDONE 1 DROP: 14; 6 SOLUTION/ DROPS OPHTHALMIC at 05:32

## 2024-04-24 RX ADMIN — CYANOCOBALAMIN TAB 1000 MCG 1000 MCG: 1000 TAB at 09:33

## 2024-04-24 RX ADMIN — AMIODARONE HYDROCHLORIDE 0.5 MG/MIN: 1.8 INJECTION, SOLUTION INTRAVENOUS at 22:38

## 2024-04-24 RX ADMIN — PIPERACILLIN SODIUM AND TAZOBACTAM SODIUM 3375 MG: 3; .375 INJECTION, POWDER, LYOPHILIZED, FOR SOLUTION INTRAVENOUS at 18:30

## 2024-04-24 RX ADMIN — WATER 40 MG: 1 INJECTION INTRAMUSCULAR; INTRAVENOUS; SUBCUTANEOUS at 09:33

## 2024-04-24 RX ADMIN — PIPERACILLIN SODIUM AND TAZOBACTAM SODIUM 3375 MG: 3; .375 INJECTION, POWDER, LYOPHILIZED, FOR SOLUTION INTRAVENOUS at 06:30

## 2024-04-24 RX ADMIN — SODIUM CHLORIDE, PRESERVATIVE FREE 10 ML: 5 INJECTION INTRAVENOUS at 10:15

## 2024-04-24 RX ADMIN — IPRATROPIUM BROMIDE AND ALBUTEROL SULFATE 1 DOSE: .5; 3 SOLUTION RESPIRATORY (INHALATION) at 19:23

## 2024-04-24 RX ADMIN — POLYVINYL ALCOHOL, POVIDONE 1 DROP: 14; 6 SOLUTION/ DROPS OPHTHALMIC at 16:53

## 2024-04-24 RX ADMIN — FENTANYL CITRATE 50 MCG: 50 INJECTION INTRAMUSCULAR; INTRAVENOUS at 02:13

## 2024-04-24 RX ADMIN — MIDODRINE HYDROCHLORIDE 10 MG: 10 TABLET ORAL at 10:14

## 2024-04-24 RX ADMIN — BUDESONIDE 1 MG: 1 SUSPENSION RESPIRATORY (INHALATION) at 07:27

## 2024-04-24 RX ADMIN — WATER 40 MG: 1 INJECTION INTRAMUSCULAR; INTRAVENOUS; SUBCUTANEOUS at 16:53

## 2024-04-24 RX ADMIN — HEPARIN SODIUM 5000 UNITS: 5000 INJECTION INTRAVENOUS; SUBCUTANEOUS at 14:30

## 2024-04-24 RX ADMIN — POLYVINYL ALCOHOL, POVIDONE 1 DROP: 14; 6 SOLUTION/ DROPS OPHTHALMIC at 07:45

## 2024-04-24 RX ADMIN — POLYVINYL ALCOHOL, POVIDONE 1 DROP: 14; 6 SOLUTION/ DROPS OPHTHALMIC at 18:47

## 2024-04-24 RX ADMIN — ALLOPURINOL 100 MG: 100 TABLET ORAL at 21:45

## 2024-04-24 RX ADMIN — ARFORMOTEROL TARTRATE 15 MCG: 15 SOLUTION RESPIRATORY (INHALATION) at 07:27

## 2024-04-24 RX ADMIN — 0.12% CHLORHEXIDINE GLUCONATE 15 ML: 1.2 RINSE ORAL at 09:33

## 2024-04-24 RX ADMIN — CHOLECALCIFEROL TAB 25 MCG (1000 UNIT) 2000 UNITS: 25 TAB at 09:33

## 2024-04-24 RX ADMIN — WATER 40 MG: 1 INJECTION INTRAMUSCULAR; INTRAVENOUS; SUBCUTANEOUS at 01:16

## 2024-04-24 RX ADMIN — BUDESONIDE 1 MG: 1 SUSPENSION RESPIRATORY (INHALATION) at 19:23

## 2024-04-24 RX ADMIN — DEXMEDETOMIDINE HYDROCHLORIDE 0.6 MCG/KG/HR: 4 INJECTION, SOLUTION INTRAVENOUS at 09:32

## 2024-04-24 RX ADMIN — MIDODRINE HYDROCHLORIDE 10 MG: 10 TABLET ORAL at 16:53

## 2024-04-24 RX ADMIN — MIDODRINE HYDROCHLORIDE 5 MG: 5 TABLET ORAL at 22:27

## 2024-04-24 RX ADMIN — EZETIMIBE 10 MG: 10 TABLET ORAL at 10:14

## 2024-04-24 RX ADMIN — AMIODARONE HYDROCHLORIDE 0.5 MG/MIN: 1.8 INJECTION, SOLUTION INTRAVENOUS at 11:25

## 2024-04-24 RX ADMIN — IPRATROPIUM BROMIDE AND ALBUTEROL SULFATE 1 DOSE: .5; 3 SOLUTION RESPIRATORY (INHALATION) at 16:57

## 2024-04-24 RX ADMIN — POLYVINYL ALCOHOL, POVIDONE 1 DROP: 14; 6 SOLUTION/ DROPS OPHTHALMIC at 11:45

## 2024-04-24 RX ADMIN — EPOETIN ALFA-EPBX 6000 UNITS: 3000 INJECTION, SOLUTION INTRAVENOUS; SUBCUTANEOUS at 16:53

## 2024-04-24 RX ADMIN — FOLIC ACID 1 MG: 1 TABLET ORAL at 10:14

## 2024-04-24 RX ADMIN — SODIUM CHLORIDE, PRESERVATIVE FREE 40 MG: 5 INJECTION INTRAVENOUS at 09:33

## 2024-04-24 RX ADMIN — DEXMEDETOMIDINE HYDROCHLORIDE 0.8 MCG/KG/HR: 4 INJECTION, SOLUTION INTRAVENOUS at 02:20

## 2024-04-24 RX ADMIN — HEPARIN SODIUM 5000 UNITS: 5000 INJECTION INTRAVENOUS; SUBCUTANEOUS at 06:30

## 2024-04-24 RX ADMIN — ASPIRIN 81 MG CHEWABLE TABLET 81 MG: 81 TABLET CHEWABLE at 09:33

## 2024-04-24 ASSESSMENT — PULMONARY FUNCTION TESTS
PIF_VALUE: 23
PIF_VALUE: 17

## 2024-04-24 ASSESSMENT — PAIN SCALES - GENERAL
PAINLEVEL_OUTOF10: 0

## 2024-04-24 NOTE — DIALYSIS
Treatment summary  Received report from Lew MCKAY Rn    Received patient returning from CT scan awake and alert. VSS. LUE AVG   Accessed without complication, treatment initiated.    Dialyzed patient in  room 315  for 3 hours.     Total Uf 2500  ml  Net UF 2000 ml     Treatment note:           Patient tolerate treatment well remain in stable condition.    Offered assistance with repositioning every 2 hours.      Vascular access visible at all times and line connected at all   times during treatment. Access LUE AVG Functioning well positive B/T  De access without complications.       Report given to Ashanti MCKAY RN with all questions answered.

## 2024-04-25 ENCOUNTER — APPOINTMENT (OUTPATIENT)
Facility: HOSPITAL | Age: 74
DRG: 208 | End: 2024-04-25
Payer: MEDICARE

## 2024-04-25 LAB
ANION GAP SERPL CALC-SCNC: 14 MMOL/L (ref 3–18)
BASOPHILS # BLD: 0 K/UL (ref 0–0.1)
BASOPHILS NFR BLD: 0 % (ref 0–2)
BUN SERPL-MCNC: 25 MG/DL (ref 7–18)
BUN/CREAT SERPL: 12 (ref 12–20)
CALCIUM SERPL-MCNC: 9.3 MG/DL (ref 8.5–10.1)
CHLORIDE SERPL-SCNC: 97 MMOL/L (ref 100–111)
CO2 SERPL-SCNC: 23 MMOL/L (ref 21–32)
CREAT SERPL-MCNC: 2.08 MG/DL (ref 0.6–1.3)
DIFFERENTIAL METHOD BLD: ABNORMAL
ECHO BSA: 1.88 M2
EOSINOPHIL # BLD: 0 K/UL (ref 0–0.4)
EOSINOPHIL NFR BLD: 0 % (ref 0–5)
ERYTHROCYTE [DISTWIDTH] IN BLOOD BY AUTOMATED COUNT: 20.2 % (ref 11.6–14.5)
GLUCOSE BLD STRIP.AUTO-MCNC: 135 MG/DL (ref 70–110)
GLUCOSE BLD STRIP.AUTO-MCNC: 149 MG/DL (ref 70–110)
GLUCOSE BLD STRIP.AUTO-MCNC: 152 MG/DL (ref 70–110)
GLUCOSE BLD STRIP.AUTO-MCNC: 177 MG/DL (ref 70–110)
GLUCOSE SERPL-MCNC: 124 MG/DL (ref 74–99)
HCT VFR BLD AUTO: 22.5 % (ref 35–45)
HGB BLD-MCNC: 7.1 G/DL (ref 12–16)
IMM GRANULOCYTES # BLD AUTO: 0.1 K/UL (ref 0–0.04)
IMM GRANULOCYTES NFR BLD AUTO: 1 % (ref 0–0.5)
LYMPHOCYTES # BLD: 0.1 K/UL (ref 0.9–3.6)
LYMPHOCYTES NFR BLD: 1 % (ref 21–52)
MAGNESIUM SERPL-MCNC: 2.1 MG/DL (ref 1.6–2.6)
MCH RBC QN AUTO: 29.8 PG (ref 24–34)
MCHC RBC AUTO-ENTMCNC: 31.6 G/DL (ref 31–37)
MCV RBC AUTO: 94.5 FL (ref 78–100)
MONOCYTES # BLD: 0.5 K/UL (ref 0.05–1.2)
MONOCYTES NFR BLD: 4 % (ref 3–10)
NEUTS SEG # BLD: 13.9 K/UL (ref 1.8–8)
NEUTS SEG NFR BLD: 95 % (ref 40–73)
NRBC # BLD: 0.33 K/UL (ref 0–0.01)
NRBC BLD-RTO: 2.3 PER 100 WBC
PHOSPHATE SERPL-MCNC: 4.6 MG/DL (ref 2.5–4.9)
PLATELET # BLD AUTO: 213 K/UL (ref 135–420)
PMV BLD AUTO: 9.7 FL (ref 9.2–11.8)
POTASSIUM SERPL-SCNC: 3.5 MMOL/L (ref 3.5–5.5)
RBC # BLD AUTO: 2.38 M/UL (ref 4.2–5.3)
SODIUM SERPL-SCNC: 134 MMOL/L (ref 136–145)
WBC # BLD AUTO: 14.6 K/UL (ref 4.6–13.2)

## 2024-04-25 PROCEDURE — 99291 CRITICAL CARE FIRST HOUR: CPT | Performed by: INTERNAL MEDICINE

## 2024-04-25 PROCEDURE — 94669 MECHANICAL CHEST WALL OSCILL: CPT

## 2024-04-25 PROCEDURE — 6360000002 HC RX W HCPCS: Performed by: INTERNAL MEDICINE

## 2024-04-25 PROCEDURE — 6360000002 HC RX W HCPCS: Performed by: PHYSICIAN ASSISTANT

## 2024-04-25 PROCEDURE — 6370000000 HC RX 637 (ALT 250 FOR IP): Performed by: STUDENT IN AN ORGANIZED HEALTH CARE EDUCATION/TRAINING PROGRAM

## 2024-04-25 PROCEDURE — 1100000003 HC PRIVATE W/ TELEMETRY

## 2024-04-25 PROCEDURE — 2580000003 HC RX 258: Performed by: INTERNAL MEDICINE

## 2024-04-25 PROCEDURE — C9113 INJ PANTOPRAZOLE SODIUM, VIA: HCPCS | Performed by: PHYSICIAN ASSISTANT

## 2024-04-25 PROCEDURE — A4216 STERILE WATER/SALINE, 10 ML: HCPCS | Performed by: PHYSICIAN ASSISTANT

## 2024-04-25 PROCEDURE — 6370000000 HC RX 637 (ALT 250 FOR IP): Performed by: PHYSICIAN ASSISTANT

## 2024-04-25 PROCEDURE — 94640 AIRWAY INHALATION TREATMENT: CPT

## 2024-04-25 PROCEDURE — 83735 ASSAY OF MAGNESIUM: CPT

## 2024-04-25 PROCEDURE — 6370000000 HC RX 637 (ALT 250 FOR IP): Performed by: HOSPITALIST

## 2024-04-25 PROCEDURE — 85025 COMPLETE CBC W/AUTO DIFF WBC: CPT

## 2024-04-25 PROCEDURE — 74018 RADEX ABDOMEN 1 VIEW: CPT

## 2024-04-25 PROCEDURE — 2580000003 HC RX 258: Performed by: STUDENT IN AN ORGANIZED HEALTH CARE EDUCATION/TRAINING PROGRAM

## 2024-04-25 PROCEDURE — 99232 SBSQ HOSP IP/OBS MODERATE 35: CPT | Performed by: FAMILY MEDICINE

## 2024-04-25 PROCEDURE — 93970 EXTREMITY STUDY: CPT | Performed by: SURGERY

## 2024-04-25 PROCEDURE — 2700000000 HC OXYGEN THERAPY PER DAY

## 2024-04-25 PROCEDURE — 84100 ASSAY OF PHOSPHORUS: CPT

## 2024-04-25 PROCEDURE — 82962 GLUCOSE BLOOD TEST: CPT

## 2024-04-25 PROCEDURE — 2500000003 HC RX 250 WO HCPCS: Performed by: REGISTERED NURSE

## 2024-04-25 PROCEDURE — 6370000000 HC RX 637 (ALT 250 FOR IP): Performed by: EMERGENCY MEDICINE

## 2024-04-25 PROCEDURE — 80048 BASIC METABOLIC PNL TOTAL CA: CPT

## 2024-04-25 PROCEDURE — 94761 N-INVAS EAR/PLS OXIMETRY MLT: CPT

## 2024-04-25 PROCEDURE — 36415 COLL VENOUS BLD VENIPUNCTURE: CPT

## 2024-04-25 PROCEDURE — 2580000003 HC RX 258: Performed by: PHYSICIAN ASSISTANT

## 2024-04-25 RX ORDER — POLYETHYLENE GLYCOL 3350 17 G/17G
34 POWDER, FOR SOLUTION ORAL DAILY
Status: DISCONTINUED | OUTPATIENT
Start: 2024-04-25 | End: 2024-05-07 | Stop reason: HOSPADM

## 2024-04-25 RX ADMIN — HEPARIN SODIUM 5000 UNITS: 5000 INJECTION INTRAVENOUS; SUBCUTANEOUS at 06:17

## 2024-04-25 RX ADMIN — BUDESONIDE 1 MG: 1 SUSPENSION RESPIRATORY (INHALATION) at 07:29

## 2024-04-25 RX ADMIN — WATER 40 MG: 1 INJECTION INTRAMUSCULAR; INTRAVENOUS; SUBCUTANEOUS at 08:36

## 2024-04-25 RX ADMIN — ARFORMOTEROL TARTRATE 15 MCG: 15 SOLUTION RESPIRATORY (INHALATION) at 19:46

## 2024-04-25 RX ADMIN — IPRATROPIUM BROMIDE AND ALBUTEROL SULFATE 1 DOSE: .5; 3 SOLUTION RESPIRATORY (INHALATION) at 07:28

## 2024-04-25 RX ADMIN — SODIUM CHLORIDE, PRESERVATIVE FREE 10 ML: 5 INJECTION INTRAVENOUS at 08:38

## 2024-04-25 RX ADMIN — IPRATROPIUM BROMIDE AND ALBUTEROL SULFATE 1 DOSE: .5; 3 SOLUTION RESPIRATORY (INHALATION) at 12:04

## 2024-04-25 RX ADMIN — AMIODARONE HYDROCHLORIDE 0.5 MG/MIN: 1.8 INJECTION, SOLUTION INTRAVENOUS at 23:19

## 2024-04-25 RX ADMIN — POLYVINYL ALCOHOL, POVIDONE 1 DROP: 14; 6 SOLUTION/ DROPS OPHTHALMIC at 11:06

## 2024-04-25 RX ADMIN — PIPERACILLIN SODIUM AND TAZOBACTAM SODIUM 3375 MG: 3; .375 INJECTION, POWDER, LYOPHILIZED, FOR SOLUTION INTRAVENOUS at 06:17

## 2024-04-25 RX ADMIN — ASPIRIN 81 MG CHEWABLE TABLET 81 MG: 81 TABLET CHEWABLE at 11:07

## 2024-04-25 RX ADMIN — IPRATROPIUM BROMIDE AND ALBUTEROL SULFATE 1 DOSE: .5; 3 SOLUTION RESPIRATORY (INHALATION) at 03:44

## 2024-04-25 RX ADMIN — WATER 20 MG: 1 INJECTION INTRAMUSCULAR; INTRAVENOUS; SUBCUTANEOUS at 20:48

## 2024-04-25 RX ADMIN — HEPARIN SODIUM 5000 UNITS: 5000 INJECTION INTRAVENOUS; SUBCUTANEOUS at 21:18

## 2024-04-25 RX ADMIN — ARFORMOTEROL TARTRATE 15 MCG: 15 SOLUTION RESPIRATORY (INHALATION) at 07:28

## 2024-04-25 RX ADMIN — HEPARIN SODIUM 5000 UNITS: 5000 INJECTION INTRAVENOUS; SUBCUTANEOUS at 13:32

## 2024-04-25 RX ADMIN — CHOLECALCIFEROL TAB 25 MCG (1000 UNIT) 2000 UNITS: 25 TAB at 08:37

## 2024-04-25 RX ADMIN — POLYETHYLENE GLYCOL 3350 34 G: 17 POWDER, FOR SOLUTION ORAL at 11:08

## 2024-04-25 RX ADMIN — INSULIN GLARGINE 15 UNITS: 100 INJECTION, SOLUTION SUBCUTANEOUS at 21:05

## 2024-04-25 RX ADMIN — PIPERACILLIN SODIUM AND TAZOBACTAM SODIUM 3375 MG: 3; .375 INJECTION, POWDER, LYOPHILIZED, FOR SOLUTION INTRAVENOUS at 18:04

## 2024-04-25 RX ADMIN — SODIUM CHLORIDE, PRESERVATIVE FREE 10 ML: 5 INJECTION INTRAVENOUS at 21:10

## 2024-04-25 RX ADMIN — IPRATROPIUM BROMIDE AND ALBUTEROL SULFATE 1 DOSE: .5; 3 SOLUTION RESPIRATORY (INHALATION) at 16:12

## 2024-04-25 RX ADMIN — FOLIC ACID 1 MG: 1 TABLET ORAL at 08:37

## 2024-04-25 RX ADMIN — POLYVINYL ALCOHOL, POVIDONE 1 DROP: 14; 6 SOLUTION/ DROPS OPHTHALMIC at 20:46

## 2024-04-25 RX ADMIN — WATER 40 MG: 1 INJECTION INTRAMUSCULAR; INTRAVENOUS; SUBCUTANEOUS at 01:54

## 2024-04-25 RX ADMIN — IPRATROPIUM BROMIDE AND ALBUTEROL SULFATE 1 DOSE: .5; 3 SOLUTION RESPIRATORY (INHALATION) at 19:46

## 2024-04-25 RX ADMIN — POLYVINYL ALCOHOL, POVIDONE 1 DROP: 14; 6 SOLUTION/ DROPS OPHTHALMIC at 23:22

## 2024-04-25 RX ADMIN — EZETIMIBE 10 MG: 10 TABLET ORAL at 08:37

## 2024-04-25 RX ADMIN — POLYVINYL ALCOHOL, POVIDONE 1 DROP: 14; 6 SOLUTION/ DROPS OPHTHALMIC at 16:35

## 2024-04-25 RX ADMIN — IPRATROPIUM BROMIDE AND ALBUTEROL SULFATE 1 DOSE: .5; 3 SOLUTION RESPIRATORY (INHALATION) at 00:17

## 2024-04-25 RX ADMIN — POLYVINYL ALCOHOL, POVIDONE 1 DROP: 14; 6 SOLUTION/ DROPS OPHTHALMIC at 08:00

## 2024-04-25 RX ADMIN — Medication 1 MG: at 08:37

## 2024-04-25 RX ADMIN — CYANOCOBALAMIN TAB 1000 MCG 1000 MCG: 1000 TAB at 08:37

## 2024-04-25 RX ADMIN — AMIODARONE HYDROCHLORIDE 0.5 MG/MIN: 1.8 INJECTION, SOLUTION INTRAVENOUS at 11:10

## 2024-04-25 RX ADMIN — BUDESONIDE 1 MG: 1 SUSPENSION RESPIRATORY (INHALATION) at 19:46

## 2024-04-25 RX ADMIN — SODIUM CHLORIDE, PRESERVATIVE FREE 40 MG: 5 INJECTION INTRAVENOUS at 08:36

## 2024-04-25 ASSESSMENT — PAIN SCALES - GENERAL: PAINLEVEL_OUTOF10: 0

## 2024-04-25 ASSESSMENT — PULMONARY FUNCTION TESTS: PEFR_L/MIN: 100

## 2024-04-25 NOTE — CARE COORDINATION
EULALIO discussed case with the attending MD, pt likely to be stable over the weekend or Monday. EULALIO placed call to [facility] rep Rao [phone] to confirm/deny bed availability and request to re-initiate pts auth\.    EULALIO received no answer and left a VM requesting a call back.    EULALIO will continue to follow up.    ADDENDUM @ 3:16     EULALIO received a return call from Maira stating she will look into HD chair/bed availability and return call to this writer.    Jayden Padilla, ANTHONY  Case Management Department

## 2024-04-25 NOTE — INTERDISCIPLINARY ROUNDS
[facility]  Pulmonary, Critical Care, and Sleep Medicine  Interdisciplinary and Ventilator Weaning Rounds     Patient discussed in morning walking rounds and interdisciplinary rounds.     ICU admission day: 4/21     Overnight events:   No acute overnight events     Assessments and best practice:  Ventilator  Ventilator Day(s): 3  Vent Settings  FiO2 : 60 %  Resp Rate (Set): 20 bpm  PEEP/CPAP (cmH2O): 8  Peak Inspiratory Flow (lpm): 24 L/sec  Insp Time (sec): 0.8 sec  Insp Rise Time (%): 70 %  Flow Sensitivity: 3 L/min  Disconnect Sensitivity (%): 75 %  Expiratory Sensitivity (%): 25 %   VAP bundle, aim to keep peak plateau pressure 25-30cm H2O  Weaning assessed and documented   Patient doesmeet criteria for SBT.   Patient is  on sedation holiday.  Plan to wean with PS yes.  Outcome: PS trial          Final plan: PS trail  Glycemic control  Diet  ADULT DIET; Regular; Low Fat/Low Chol/High Fiber/2 gm Na; 1500 ml  ADULT ORAL NUTRITION SUPPLEMENT; Lunch; Fortified Gelatin Oral Supplement  Stress ulcer prophylaxis.  Protonix  DVT prophylaxis.  SQH  Need for Lines, marino assessed.  Yes  Restraint Reevaluation   Yes  I have reevaluated the patient one hour after initiation of intervention. The patient is comfortable, uninjured, but continues to pose an imminent risk of injury to self to themselves and/or serious disruption of medical treatment required to keep patient stable. The patient's current medical and behavioral conditions that warrant the use intervention include Poor safety judgment:  Impaired recall and forgetfulness to use of call light and Pulling out devices:  Pulling lines.  Restraint or seclusion will be discontinued at the earliest possible time, regardless of the scheduled expiration of the order. Based on my evaluation, restraints will be continued: Yes  Disposition regarding transferring out of the ICU  RED        Family contact/MPOA:   Family updated by ICU team     Palliative 
[facility]  Pulmonary, Critical Care, and Sleep Medicine  Interdisciplinary and Ventilator Weaning Rounds     Patient discussed in morning walking rounds and interdisciplinary rounds.     ICU admission day: 4/21     Overnight events:   No acute overnight events     Assessments and best practice:  Ventilator  N/A, extubated on 4/24  Glycemic control  Diet  ADULT DIET; Regular; Low Fat/Low Chol/High Fiber/2 gm Na; 1500 ml  ADULT ORAL NUTRITION SUPPLEMENT; Lunch; Fortified Gelatin Oral Supplement  Stress ulcer prophylaxis.  Protonix  DVT prophylaxis.  SQH  Need for Lines, marino assessed.  Yes  Restraint Reevaluation   Yes  I have reevaluated the patient one hour after initiation of intervention. The patient is comfortable, uninjured, but continues to pose an imminent risk of injury to self to themselves and/or serious disruption of medical treatment required to keep patient stable. The patient's current medical and behavioral conditions that warrant the use intervention include Poor safety judgment:  Impaired recall and forgetfulness to use of call light and Pulling out devices:  Pulling lines.  Restraint or seclusion will be discontinued at the earliest possible time, regardless of the scheduled expiration of the order. Based on my evaluation, restraints will be continued: Yes  Disposition regarding transferring out of the ICU  RED        Family contact/MPOA:   Family updated by ICU team     Palliative consult within 3 days of admission to ICU-  Ethics Guidance: 21 days        Daily Plans:  Transfer to stepdown  Miralax     JUDSON time 10 minutes           Shelby Jimenez PA-C  04/25/24  Pulmonary, Critical Care Medicine  [facility]      
[facility]  Pulmonary, Critical Care, and Sleep Medicine  Interdisciplinary and Ventilator Weaning Rounds     Patient discussed in morning walking rounds and interdisciplinary rounds.     ICU admission day: 4/21     Overnight events:   Febrile overnight     Assessments and best practice:  Ventilator  Ventilator Day(s): 2  Vent Settings  FiO2 : 60 %  Resp Rate (Set): 20 bpm  PEEP/CPAP (cmH2O): 8  Peak Inspiratory Flow (lpm): 24 L/sec  Insp Time (sec): 0.8 sec  Insp Rise Time (%): 70 %  Flow Sensitivity: 3 L/min  Disconnect Sensitivity (%): 75 %  Expiratory Sensitivity (%): 25 %   VAP bundle, aim to keep peak plateau pressure 25-30cm H2O  Weaning assessed and documented   Patient doesmeet criteria for SBT.   Patient is  on sedation holiday.  Plan to wean with PS yes.  Outcome: PS trial          Final plan: PS trail  Glycemic control  Diet  ADULT DIET; Regular; Low Fat/Low Chol/High Fiber/2 gm Na; 1500 ml  ADULT ORAL NUTRITION SUPPLEMENT; Lunch; Fortified Gelatin Oral Supplement  Stress ulcer prophylaxis.  Protonix  DVT prophylaxis.  SQH  Need for Lines, marino assessed.  Yes  Restraint Reevaluation   Yes  I have reevaluated the patient one hour after initiation of intervention. The patient is comfortable, uninjured, but continues to pose an imminent risk of injury to self to themselves and/or serious disruption of medical treatment required to keep patient stable. The patient's current medical and behavioral conditions that warrant the use intervention include Poor safety judgment:  Impaired recall and forgetfulness to use of call light and Pulling out devices:  Pulling lines.  Restraint or seclusion will be discontinued at the earliest possible time, regardless of the scheduled expiration of the order. Based on my evaluation, restraints will be continued: Yes  Disposition regarding transferring out of the ICU  RED        Family contact/MPOA:   Family updated by ICU team     Palliative consult 
  Family updated by ICU team    Palliative consult within 3 days of admission to ICU-  Ethics Guidance: 21 days      Daily Plans:  PS trial  Advance ETT to 2 cm  KUB  Will start trickle  Albumin and IHD per nephro    JUDSON time 10 minutes        ESTRADA Mathias - NP  04/22/24  Pulmonary, Critical Care Medicine  [facility]

## 2024-04-26 PROBLEM — K56.7 ILEUS (HCC): Status: ACTIVE | Noted: 2024-04-26

## 2024-04-26 PROBLEM — I50.33 ACUTE ON CHRONIC DIASTOLIC (CONGESTIVE) HEART FAILURE (HCC): Status: ACTIVE | Noted: 2024-01-29

## 2024-04-26 LAB
ANION GAP SERPL CALC-SCNC: 14 MMOL/L (ref 3–18)
BASOPHILS # BLD: 0 K/UL (ref 0–0.1)
BASOPHILS NFR BLD: 0 % (ref 0–2)
BUN SERPL-MCNC: 55 MG/DL (ref 7–18)
BUN/CREAT SERPL: 12 (ref 12–20)
CALCIUM SERPL-MCNC: 9.4 MG/DL (ref 8.5–10.1)
CHLORIDE SERPL-SCNC: 97 MMOL/L (ref 100–111)
CO2 SERPL-SCNC: 23 MMOL/L (ref 21–32)
CREAT SERPL-MCNC: 4.59 MG/DL (ref 0.6–1.3)
DIFFERENTIAL METHOD BLD: ABNORMAL
EOSINOPHIL # BLD: 0 K/UL (ref 0–0.4)
EOSINOPHIL NFR BLD: 0 % (ref 0–5)
ERYTHROCYTE [DISTWIDTH] IN BLOOD BY AUTOMATED COUNT: 19.5 % (ref 11.6–14.5)
GLUCOSE BLD STRIP.AUTO-MCNC: 135 MG/DL (ref 70–110)
GLUCOSE BLD STRIP.AUTO-MCNC: 138 MG/DL (ref 70–110)
GLUCOSE BLD STRIP.AUTO-MCNC: 158 MG/DL (ref 70–110)
GLUCOSE BLD STRIP.AUTO-MCNC: 167 MG/DL (ref 70–110)
GLUCOSE BLD STRIP.AUTO-MCNC: 175 MG/DL (ref 70–110)
GLUCOSE SERPL-MCNC: 172 MG/DL (ref 74–99)
HCT VFR BLD AUTO: 21.3 % (ref 35–45)
HCT VFR BLD AUTO: 25 % (ref 35–45)
HGB BLD-MCNC: 6.8 G/DL (ref 12–16)
HGB BLD-MCNC: 8.2 G/DL (ref 12–16)
HISTORY CHECK: NORMAL
IMM GRANULOCYTES # BLD AUTO: 0.1 K/UL (ref 0–0.04)
IMM GRANULOCYTES NFR BLD AUTO: 1 % (ref 0–0.5)
LYMPHOCYTES # BLD: 0.2 K/UL (ref 0.9–3.6)
LYMPHOCYTES NFR BLD: 2 % (ref 21–52)
MAGNESIUM SERPL-MCNC: 2.4 MG/DL (ref 1.6–2.6)
MCH RBC QN AUTO: 30 PG (ref 24–34)
MCHC RBC AUTO-ENTMCNC: 31.9 G/DL (ref 31–37)
MCV RBC AUTO: 93.8 FL (ref 78–100)
MONOCYTES # BLD: 0.4 K/UL (ref 0.05–1.2)
MONOCYTES NFR BLD: 4 % (ref 3–10)
NEUTS SEG # BLD: 9.6 K/UL (ref 1.8–8)
NEUTS SEG NFR BLD: 93 % (ref 40–73)
NRBC # BLD: 0.44 K/UL (ref 0–0.01)
NRBC BLD-RTO: 4.3 PER 100 WBC
PHOSPHATE SERPL-MCNC: 6.8 MG/DL (ref 2.5–4.9)
PLATELET # BLD AUTO: 189 K/UL (ref 135–420)
PMV BLD AUTO: 9.8 FL (ref 9.2–11.8)
POTASSIUM SERPL-SCNC: 3.7 MMOL/L (ref 3.5–5.5)
RBC # BLD AUTO: 2.27 M/UL (ref 4.2–5.3)
SODIUM SERPL-SCNC: 134 MMOL/L (ref 136–145)
WBC # BLD AUTO: 10.3 K/UL (ref 4.6–13.2)

## 2024-04-26 PROCEDURE — 6360000002 HC RX W HCPCS: Performed by: PHYSICIAN ASSISTANT

## 2024-04-26 PROCEDURE — 94761 N-INVAS EAR/PLS OXIMETRY MLT: CPT

## 2024-04-26 PROCEDURE — 2580000003 HC RX 258: Performed by: INTERNAL MEDICINE

## 2024-04-26 PROCEDURE — 36430 TRANSFUSION BLD/BLD COMPNT: CPT

## 2024-04-26 PROCEDURE — A4216 STERILE WATER/SALINE, 10 ML: HCPCS | Performed by: PHYSICIAN ASSISTANT

## 2024-04-26 PROCEDURE — 85018 HEMOGLOBIN: CPT

## 2024-04-26 PROCEDURE — 94640 AIRWAY INHALATION TREATMENT: CPT

## 2024-04-26 PROCEDURE — 92610 EVALUATE SWALLOWING FUNCTION: CPT

## 2024-04-26 PROCEDURE — 2580000003 HC RX 258: Performed by: STUDENT IN AN ORGANIZED HEALTH CARE EDUCATION/TRAINING PROGRAM

## 2024-04-26 PROCEDURE — 6370000000 HC RX 637 (ALT 250 FOR IP): Performed by: PHYSICIAN ASSISTANT

## 2024-04-26 PROCEDURE — 6370000000 HC RX 637 (ALT 250 FOR IP): Performed by: STUDENT IN AN ORGANIZED HEALTH CARE EDUCATION/TRAINING PROGRAM

## 2024-04-26 PROCEDURE — 6370000000 HC RX 637 (ALT 250 FOR IP): Performed by: HOSPITALIST

## 2024-04-26 PROCEDURE — 1100000003 HC PRIVATE W/ TELEMETRY

## 2024-04-26 PROCEDURE — 36415 COLL VENOUS BLD VENIPUNCTURE: CPT

## 2024-04-26 PROCEDURE — 99232 SBSQ HOSP IP/OBS MODERATE 35: CPT | Performed by: STUDENT IN AN ORGANIZED HEALTH CARE EDUCATION/TRAINING PROGRAM

## 2024-04-26 PROCEDURE — 83735 ASSAY OF MAGNESIUM: CPT

## 2024-04-26 PROCEDURE — 90935 HEMODIALYSIS ONE EVALUATION: CPT

## 2024-04-26 PROCEDURE — C9113 INJ PANTOPRAZOLE SODIUM, VIA: HCPCS | Performed by: PHYSICIAN ASSISTANT

## 2024-04-26 PROCEDURE — 82962 GLUCOSE BLOOD TEST: CPT

## 2024-04-26 PROCEDURE — 6360000002 HC RX W HCPCS: Performed by: INTERNAL MEDICINE

## 2024-04-26 PROCEDURE — 92526 ORAL FUNCTION THERAPY: CPT

## 2024-04-26 PROCEDURE — 2500000003 HC RX 250 WO HCPCS: Performed by: REGISTERED NURSE

## 2024-04-26 PROCEDURE — 85014 HEMATOCRIT: CPT

## 2024-04-26 PROCEDURE — P9016 RBC LEUKOCYTES REDUCED: HCPCS

## 2024-04-26 PROCEDURE — 2700000000 HC OXYGEN THERAPY PER DAY

## 2024-04-26 PROCEDURE — 80048 BASIC METABOLIC PNL TOTAL CA: CPT

## 2024-04-26 PROCEDURE — 85025 COMPLETE CBC W/AUTO DIFF WBC: CPT

## 2024-04-26 PROCEDURE — 84100 ASSAY OF PHOSPHORUS: CPT

## 2024-04-26 PROCEDURE — 2580000003 HC RX 258: Performed by: PHYSICIAN ASSISTANT

## 2024-04-26 RX ORDER — SODIUM CHLORIDE 9 MG/ML
INJECTION, SOLUTION INTRAVENOUS PRN
Status: DISCONTINUED | OUTPATIENT
Start: 2024-04-26 | End: 2024-05-07 | Stop reason: HOSPADM

## 2024-04-26 RX ADMIN — PIPERACILLIN SODIUM AND TAZOBACTAM SODIUM 3375 MG: 3; .375 INJECTION, POWDER, LYOPHILIZED, FOR SOLUTION INTRAVENOUS at 17:24

## 2024-04-26 RX ADMIN — GABAPENTIN 300 MG: 300 CAPSULE ORAL at 17:24

## 2024-04-26 RX ADMIN — ATORVASTATIN CALCIUM 80 MG: 40 TABLET, FILM COATED ORAL at 22:41

## 2024-04-26 RX ADMIN — POLYVINYL ALCOHOL, POVIDONE 1 DROP: 14; 6 SOLUTION/ DROPS OPHTHALMIC at 12:17

## 2024-04-26 RX ADMIN — HEPARIN SODIUM 5000 UNITS: 5000 INJECTION INTRAVENOUS; SUBCUTANEOUS at 05:30

## 2024-04-26 RX ADMIN — ARFORMOTEROL TARTRATE 15 MCG: 15 SOLUTION RESPIRATORY (INHALATION) at 07:16

## 2024-04-26 RX ADMIN — SODIUM CHLORIDE, PRESERVATIVE FREE 40 MG: 5 INJECTION INTRAVENOUS at 09:11

## 2024-04-26 RX ADMIN — PIPERACILLIN SODIUM AND TAZOBACTAM SODIUM 3375 MG: 3; .375 INJECTION, POWDER, LYOPHILIZED, FOR SOLUTION INTRAVENOUS at 05:30

## 2024-04-26 RX ADMIN — SEVELAMER CARBONATE 800 MG: 800 TABLET, FILM COATED ORAL at 17:24

## 2024-04-26 RX ADMIN — BUDESONIDE 1 MG: 1 SUSPENSION RESPIRATORY (INHALATION) at 07:16

## 2024-04-26 RX ADMIN — POLYVINYL ALCOHOL, POVIDONE 1 DROP: 14; 6 SOLUTION/ DROPS OPHTHALMIC at 17:24

## 2024-04-26 RX ADMIN — EPOETIN ALFA-EPBX 6000 UNITS: 3000 INJECTION, SOLUTION INTRAVENOUS; SUBCUTANEOUS at 17:25

## 2024-04-26 RX ADMIN — POLYVINYL ALCOHOL, POVIDONE 1 DROP: 14; 6 SOLUTION/ DROPS OPHTHALMIC at 09:11

## 2024-04-26 RX ADMIN — ARFORMOTEROL TARTRATE 15 MCG: 15 SOLUTION RESPIRATORY (INHALATION) at 21:25

## 2024-04-26 RX ADMIN — SODIUM CHLORIDE, PRESERVATIVE FREE 10 ML: 5 INJECTION INTRAVENOUS at 22:49

## 2024-04-26 RX ADMIN — IPRATROPIUM BROMIDE AND ALBUTEROL SULFATE 1 DOSE: .5; 3 SOLUTION RESPIRATORY (INHALATION) at 07:16

## 2024-04-26 RX ADMIN — WATER 20 MG: 1 INJECTION INTRAMUSCULAR; INTRAVENOUS; SUBCUTANEOUS at 22:43

## 2024-04-26 RX ADMIN — POLYVINYL ALCOHOL, POVIDONE 1 DROP: 14; 6 SOLUTION/ DROPS OPHTHALMIC at 19:53

## 2024-04-26 RX ADMIN — HEPARIN SODIUM 5000 UNITS: 5000 INJECTION INTRAVENOUS; SUBCUTANEOUS at 22:42

## 2024-04-26 RX ADMIN — SODIUM CHLORIDE, PRESERVATIVE FREE 10 ML: 5 INJECTION INTRAVENOUS at 09:45

## 2024-04-26 RX ADMIN — ALLOPURINOL 100 MG: 100 TABLET ORAL at 22:42

## 2024-04-26 RX ADMIN — INSULIN GLARGINE 15 UNITS: 100 INJECTION, SOLUTION SUBCUTANEOUS at 22:42

## 2024-04-26 RX ADMIN — IPRATROPIUM BROMIDE AND ALBUTEROL SULFATE 1 DOSE: .5; 3 SOLUTION RESPIRATORY (INHALATION) at 11:19

## 2024-04-26 RX ADMIN — BUDESONIDE 1 MG: 1 SUSPENSION RESPIRATORY (INHALATION) at 21:25

## 2024-04-26 RX ADMIN — IPRATROPIUM BROMIDE AND ALBUTEROL SULFATE 1 DOSE: .5; 3 SOLUTION RESPIRATORY (INHALATION) at 21:25

## 2024-04-26 RX ADMIN — IPRATROPIUM BROMIDE AND ALBUTEROL SULFATE 1 DOSE: .5; 3 SOLUTION RESPIRATORY (INHALATION) at 04:07

## 2024-04-26 RX ADMIN — POLYVINYL ALCOHOL, POVIDONE 1 DROP: 14; 6 SOLUTION/ DROPS OPHTHALMIC at 03:17

## 2024-04-26 RX ADMIN — WATER 20 MG: 1 INJECTION INTRAMUSCULAR; INTRAVENOUS; SUBCUTANEOUS at 09:11

## 2024-04-26 RX ADMIN — AMIODARONE HYDROCHLORIDE 0.5 MG/MIN: 1.8 INJECTION, SOLUTION INTRAVENOUS at 12:14

## 2024-04-26 ASSESSMENT — PAIN DESCRIPTION - ONSET
ONSET: ON-GOING
ONSET: ON-GOING

## 2024-04-26 ASSESSMENT — PAIN - FUNCTIONAL ASSESSMENT: PAIN_FUNCTIONAL_ASSESSMENT: PREVENTS OR INTERFERES SOME ACTIVE ACTIVITIES AND ADLS

## 2024-04-26 ASSESSMENT — PAIN DESCRIPTION - DESCRIPTORS
DESCRIPTORS: ACHING;DISCOMFORT
DESCRIPTORS: SHARP

## 2024-04-26 ASSESSMENT — PAIN DESCRIPTION - ORIENTATION: ORIENTATION: POSTERIOR

## 2024-04-26 ASSESSMENT — PAIN DESCRIPTION - LOCATION
LOCATION: BUTTOCKS
LOCATION: BACK

## 2024-04-26 ASSESSMENT — PAIN DESCRIPTION - PAIN TYPE: TYPE: CHRONIC PAIN

## 2024-04-26 ASSESSMENT — PAIN SCALES - GENERAL
PAINLEVEL_OUTOF10: 10
PAINLEVEL_OUTOF10: 7

## 2024-04-26 ASSESSMENT — PAIN DESCRIPTION - FREQUENCY: FREQUENCY: CONTINUOUS

## 2024-04-26 NOTE — CONSENT
Informed Consent for Blood Component Transfusion Note    Patient previously consented for transfusion by other provider, Note entered in chart to stop epic alert.    I have discussed with the patient the rationale for blood component transfusion; its benefits in treating or preventing fatigue, organ damage, or death; and its risk which includes mild transfusion reactions, rare risk of blood borne infection, or more serious but rare reactions. I have discussed the alternatives to transfusion, including the risk and consequences of not receiving transfusion. The patient had an opportunity to ask questions and had agreed to proceed with transfusion of blood components.    Electronically signed by Johnny Jones MD on 4/26/24 at 5:52 AM EDT

## 2024-04-26 NOTE — CARE COORDINATION
EULALIO reached out to Maira [facility]ke[facility] to see if she has a bed available, awaiting a response.     Tamia Villela, MSW     [phone]

## 2024-04-27 LAB
ABO + RH BLD: NORMAL
ANION GAP SERPL CALC-SCNC: 9 MMOL/L (ref 3–18)
BACTERIA SPEC CULT: NORMAL
BACTERIA SPEC CULT: NORMAL
BASOPHILS # BLD: 0 K/UL (ref 0–0.1)
BASOPHILS NFR BLD: 0 % (ref 0–2)
BLD PROD TYP BPU: NORMAL
BLD PROD TYP BPU: NORMAL
BLOOD BANK BLOOD PRODUCT EXPIRATION DATE: NORMAL
BLOOD BANK BLOOD PRODUCT EXPIRATION DATE: NORMAL
BLOOD BANK DISPENSE STATUS: NORMAL
BLOOD BANK DISPENSE STATUS: NORMAL
BLOOD BANK ISBT PRODUCT BLOOD TYPE: 5100
BLOOD BANK ISBT PRODUCT BLOOD TYPE: 5100
BLOOD BANK PRODUCT CODE: NORMAL
BLOOD BANK PRODUCT CODE: NORMAL
BLOOD BANK UNIT TYPE AND RH: NORMAL
BLOOD BANK UNIT TYPE AND RH: NORMAL
BLOOD GROUP ANTIBODIES SERPL: NORMAL
BPU ID: NORMAL
BPU ID: NORMAL
BUN SERPL-MCNC: 33 MG/DL (ref 7–18)
BUN/CREAT SERPL: 10 (ref 12–20)
CALCIUM SERPL-MCNC: 8.9 MG/DL (ref 8.5–10.1)
CALLED TO: NORMAL
CALLED TO:: NORMAL
CHLORIDE SERPL-SCNC: 100 MMOL/L (ref 100–111)
CO2 SERPL-SCNC: 25 MMOL/L (ref 21–32)
CREAT SERPL-MCNC: 3.35 MG/DL (ref 0.6–1.3)
CROSSMATCH RESULT: NORMAL
CROSSMATCH RESULT: NORMAL
DIFFERENTIAL METHOD BLD: ABNORMAL
EOSINOPHIL # BLD: 0 K/UL (ref 0–0.4)
EOSINOPHIL NFR BLD: 0 % (ref 0–5)
ERYTHROCYTE [DISTWIDTH] IN BLOOD BY AUTOMATED COUNT: 19.4 % (ref 11.6–14.5)
GLUCOSE BLD STRIP.AUTO-MCNC: 216 MG/DL (ref 70–110)
GLUCOSE BLD STRIP.AUTO-MCNC: 285 MG/DL (ref 70–110)
GLUCOSE SERPL-MCNC: 258 MG/DL (ref 74–99)
HCT VFR BLD AUTO: 27.9 % (ref 35–45)
HGB BLD-MCNC: 8.9 G/DL (ref 12–16)
IMM GRANULOCYTES # BLD AUTO: 0 K/UL
IMM GRANULOCYTES NFR BLD AUTO: 0 %
LYMPHOCYTES # BLD: 0.4 K/UL (ref 0.9–3.6)
LYMPHOCYTES NFR BLD: 4 % (ref 21–52)
MAGNESIUM SERPL-MCNC: 2.3 MG/DL (ref 1.6–2.6)
MCH RBC QN AUTO: 29.6 PG (ref 24–34)
MCHC RBC AUTO-ENTMCNC: 31.9 G/DL (ref 31–37)
MCV RBC AUTO: 92.7 FL (ref 78–100)
MONOCYTES # BLD: 0.2 K/UL (ref 0.05–1.2)
MONOCYTES NFR BLD: 2 % (ref 3–10)
NEUTS SEG # BLD: 8.9 K/UL (ref 1.8–8)
NEUTS SEG NFR BLD: 94 % (ref 40–73)
NRBC # BLD: 0.73 K/UL (ref 0–0.01)
NRBC BLD-RTO: 7.7 PER 100 WBC
PHOSPHATE SERPL-MCNC: 3.6 MG/DL (ref 2.5–4.9)
PLATELET # BLD AUTO: 184 K/UL (ref 135–420)
PLATELET COMMENT: ABNORMAL
PMV BLD AUTO: 10 FL (ref 9.2–11.8)
POTASSIUM SERPL-SCNC: 3.3 MMOL/L (ref 3.5–5.5)
RBC # BLD AUTO: 3.01 M/UL (ref 4.2–5.3)
RBC MORPH BLD: ABNORMAL
RBC MORPH BLD: ABNORMAL
SERVICE CMNT-IMP: NORMAL
SERVICE CMNT-IMP: NORMAL
SODIUM SERPL-SCNC: 134 MMOL/L (ref 136–145)
SPECIMEN EXP DATE BLD: NORMAL
UNIT DIVISION: 0
UNIT DIVISION: 0
UNIT ISSUE DATE/TIME: NORMAL
UNIT ISSUE DATE/TIME: NORMAL
WBC # BLD AUTO: 9.5 K/UL (ref 4.6–13.2)

## 2024-04-27 PROCEDURE — 6360000002 HC RX W HCPCS: Performed by: PHYSICIAN ASSISTANT

## 2024-04-27 PROCEDURE — 6370000000 HC RX 637 (ALT 250 FOR IP): Performed by: STUDENT IN AN ORGANIZED HEALTH CARE EDUCATION/TRAINING PROGRAM

## 2024-04-27 PROCEDURE — 2580000003 HC RX 258: Performed by: PHYSICIAN ASSISTANT

## 2024-04-27 PROCEDURE — 2580000003 HC RX 258: Performed by: STUDENT IN AN ORGANIZED HEALTH CARE EDUCATION/TRAINING PROGRAM

## 2024-04-27 PROCEDURE — 6370000000 HC RX 637 (ALT 250 FOR IP): Performed by: PHYSICIAN ASSISTANT

## 2024-04-27 PROCEDURE — 6360000002 HC RX W HCPCS: Performed by: INTERNAL MEDICINE

## 2024-04-27 PROCEDURE — 84100 ASSAY OF PHOSPHORUS: CPT

## 2024-04-27 PROCEDURE — 83735 ASSAY OF MAGNESIUM: CPT

## 2024-04-27 PROCEDURE — 85025 COMPLETE CBC W/AUTO DIFF WBC: CPT

## 2024-04-27 PROCEDURE — 82533 TOTAL CORTISOL: CPT

## 2024-04-27 PROCEDURE — 94761 N-INVAS EAR/PLS OXIMETRY MLT: CPT

## 2024-04-27 PROCEDURE — 2500000003 HC RX 250 WO HCPCS: Performed by: REGISTERED NURSE

## 2024-04-27 PROCEDURE — 2580000003 HC RX 258: Performed by: INTERNAL MEDICINE

## 2024-04-27 PROCEDURE — C9113 INJ PANTOPRAZOLE SODIUM, VIA: HCPCS | Performed by: PHYSICIAN ASSISTANT

## 2024-04-27 PROCEDURE — 6370000000 HC RX 637 (ALT 250 FOR IP): Performed by: NURSE PRACTITIONER

## 2024-04-27 PROCEDURE — 1100000003 HC PRIVATE W/ TELEMETRY

## 2024-04-27 PROCEDURE — 99232 SBSQ HOSP IP/OBS MODERATE 35: CPT | Performed by: INTERNAL MEDICINE

## 2024-04-27 PROCEDURE — 82962 GLUCOSE BLOOD TEST: CPT

## 2024-04-27 PROCEDURE — 6370000000 HC RX 637 (ALT 250 FOR IP): Performed by: HOSPITALIST

## 2024-04-27 PROCEDURE — 36415 COLL VENOUS BLD VENIPUNCTURE: CPT

## 2024-04-27 PROCEDURE — 6370000000 HC RX 637 (ALT 250 FOR IP): Performed by: INTERNAL MEDICINE

## 2024-04-27 PROCEDURE — 6370000000 HC RX 637 (ALT 250 FOR IP): Performed by: EMERGENCY MEDICINE

## 2024-04-27 PROCEDURE — 80048 BASIC METABOLIC PNL TOTAL CA: CPT

## 2024-04-27 PROCEDURE — 94640 AIRWAY INHALATION TREATMENT: CPT

## 2024-04-27 RX ORDER — POTASSIUM CHLORIDE 750 MG/1
10 TABLET, EXTENDED RELEASE ORAL ONCE
Status: COMPLETED | OUTPATIENT
Start: 2024-04-27 | End: 2024-04-27

## 2024-04-27 RX ORDER — AMOXICILLIN AND CLAVULANATE POTASSIUM 500; 125 MG/1; MG/1
1 TABLET, FILM COATED ORAL EVERY 12 HOURS SCHEDULED
Status: DISCONTINUED | OUTPATIENT
Start: 2024-04-27 | End: 2024-04-30

## 2024-04-27 RX ORDER — PANTOPRAZOLE SODIUM 40 MG/1
40 TABLET, DELAYED RELEASE ORAL
Status: DISCONTINUED | OUTPATIENT
Start: 2024-04-28 | End: 2024-05-07 | Stop reason: HOSPADM

## 2024-04-27 RX ADMIN — POTASSIUM CHLORIDE 10 MEQ: 750 TABLET, EXTENDED RELEASE ORAL at 15:54

## 2024-04-27 RX ADMIN — ARFORMOTEROL TARTRATE 15 MCG: 15 SOLUTION RESPIRATORY (INHALATION) at 07:42

## 2024-04-27 RX ADMIN — MIDODRINE HYDROCHLORIDE 5 MG: 5 TABLET ORAL at 15:55

## 2024-04-27 RX ADMIN — WATER 20 MG: 1 INJECTION INTRAMUSCULAR; INTRAVENOUS; SUBCUTANEOUS at 09:06

## 2024-04-27 RX ADMIN — IPRATROPIUM BROMIDE AND ALBUTEROL SULFATE 1 DOSE: .5; 3 SOLUTION RESPIRATORY (INHALATION) at 07:42

## 2024-04-27 RX ADMIN — AMIODARONE HYDROCHLORIDE 0.5 MG/MIN: 1.8 INJECTION, SOLUTION INTRAVENOUS at 15:49

## 2024-04-27 RX ADMIN — INSULIN LISPRO 4 UNITS: 100 INJECTION, SOLUTION INTRAVENOUS; SUBCUTANEOUS at 15:56

## 2024-04-27 RX ADMIN — SODIUM CHLORIDE, PRESERVATIVE FREE 10 ML: 5 INJECTION INTRAVENOUS at 23:55

## 2024-04-27 RX ADMIN — AMOXICILLIN AND CLAVULANATE POTASSIUM 1 TABLET: 500; 125 TABLET, FILM COATED ORAL at 23:45

## 2024-04-27 RX ADMIN — SODIUM CHLORIDE, PRESERVATIVE FREE 40 MG: 5 INJECTION INTRAVENOUS at 09:07

## 2024-04-27 RX ADMIN — IPRATROPIUM BROMIDE AND ALBUTEROL SULFATE 1 DOSE: .5; 3 SOLUTION RESPIRATORY (INHALATION) at 20:40

## 2024-04-27 RX ADMIN — HEPARIN SODIUM 5000 UNITS: 5000 INJECTION INTRAVENOUS; SUBCUTANEOUS at 23:44

## 2024-04-27 RX ADMIN — BUDESONIDE 1 MG: 1 SUSPENSION RESPIRATORY (INHALATION) at 07:42

## 2024-04-27 RX ADMIN — BUDESONIDE 1 MG: 1 SUSPENSION RESPIRATORY (INHALATION) at 20:40

## 2024-04-27 RX ADMIN — MIDODRINE HYDROCHLORIDE 5 MG: 5 TABLET ORAL at 23:45

## 2024-04-27 RX ADMIN — SEVELAMER CARBONATE 800 MG: 800 TABLET, FILM COATED ORAL at 15:55

## 2024-04-27 RX ADMIN — GABAPENTIN 300 MG: 300 CAPSULE ORAL at 17:42

## 2024-04-27 RX ADMIN — WATER 20 MG: 1 INJECTION INTRAMUSCULAR; INTRAVENOUS; SUBCUTANEOUS at 23:44

## 2024-04-27 RX ADMIN — INSULIN GLARGINE 15 UNITS: 100 INJECTION, SOLUTION SUBCUTANEOUS at 23:46

## 2024-04-27 RX ADMIN — ATORVASTATIN CALCIUM 80 MG: 40 TABLET, FILM COATED ORAL at 23:45

## 2024-04-27 RX ADMIN — AMIODARONE HYDROCHLORIDE 0.5 MG/MIN: 1.8 INJECTION, SOLUTION INTRAVENOUS at 00:53

## 2024-04-27 RX ADMIN — INSULIN LISPRO 2 UNITS: 100 INJECTION, SOLUTION INTRAVENOUS; SUBCUTANEOUS at 23:57

## 2024-04-27 RX ADMIN — POLYVINYL ALCOHOL, POVIDONE 1 DROP: 14; 6 SOLUTION/ DROPS OPHTHALMIC at 23:57

## 2024-04-27 RX ADMIN — ARFORMOTEROL TARTRATE 15 MCG: 15 SOLUTION RESPIRATORY (INHALATION) at 20:40

## 2024-04-27 ASSESSMENT — PAIN SCALES - GENERAL
PAINLEVEL_OUTOF10: 0
PAINLEVEL_OUTOF10: 0
PAINLEVEL_OUTOF10: 10
PAINLEVEL_OUTOF10: 0
PAINLEVEL_OUTOF10: 10

## 2024-04-27 ASSESSMENT — PAIN DESCRIPTION - ORIENTATION
ORIENTATION: POSTERIOR
ORIENTATION: POSTERIOR

## 2024-04-27 ASSESSMENT — PAIN DESCRIPTION - LOCATION
LOCATION: BACK;ARM
LOCATION: BACK

## 2024-04-27 ASSESSMENT — PAIN DESCRIPTION - DESCRIPTORS
DESCRIPTORS: ACHING;DISCOMFORT
DESCRIPTORS: ACHING

## 2024-04-27 ASSESSMENT — PAIN DESCRIPTION - PAIN TYPE
TYPE: CHRONIC PAIN
TYPE: CHRONIC PAIN

## 2024-04-27 NOTE — DIALYSIS
Patient arrived to dialysis unit for her dialysis tx, pre-tx assessment done, pt calm breathing comfortably on oxygen 2L via NC, SPO2 98%  HD Machine ready but patient refusing to be stuck. \"Leave me alone, I don't want any dialysis, and if you insist i will hit you\" patient stated. Efforts to calmly explain how important it was for her to have her tx did not bare any fruits. Dr Márquez informed said it was ok. Patient sent back to her room in a stable condition on oxygen. Primary Nurse updated.

## 2024-04-28 LAB
ALBUMIN SERPL-MCNC: 2.9 G/DL (ref 3.4–5)
ALBUMIN/GLOB SERPL: 0.9 (ref 0.8–1.7)
ALP SERPL-CCNC: 128 U/L (ref 45–117)
ALT SERPL-CCNC: 52 U/L (ref 13–56)
ANION GAP SERPL CALC-SCNC: 10 MMOL/L (ref 3–18)
AST SERPL-CCNC: 13 U/L (ref 10–38)
BASOPHILS # BLD: 0 K/UL (ref 0–0.1)
BASOPHILS NFR BLD: 0 % (ref 0–2)
BILIRUB SERPL-MCNC: 1 MG/DL (ref 0.2–1)
BUN SERPL-MCNC: 57 MG/DL (ref 7–18)
BUN/CREAT SERPL: 11 (ref 12–20)
CALCIUM SERPL-MCNC: 8.6 MG/DL (ref 8.5–10.1)
CHLORIDE SERPL-SCNC: 96 MMOL/L (ref 100–111)
CO2 SERPL-SCNC: 25 MMOL/L (ref 21–32)
CORTIS SERPL-MCNC: 4.1 UG/DL
CREAT SERPL-MCNC: 5.12 MG/DL (ref 0.6–1.3)
DIFFERENTIAL METHOD BLD: ABNORMAL
EOSINOPHIL # BLD: 0 K/UL (ref 0–0.4)
EOSINOPHIL NFR BLD: 0 % (ref 0–5)
ERYTHROCYTE [DISTWIDTH] IN BLOOD BY AUTOMATED COUNT: 19 % (ref 11.6–14.5)
GLOBULIN SER CALC-MCNC: 3.2 G/DL (ref 2–4)
GLUCOSE BLD STRIP.AUTO-MCNC: 261 MG/DL (ref 70–110)
GLUCOSE BLD STRIP.AUTO-MCNC: 274 MG/DL (ref 70–110)
GLUCOSE BLD STRIP.AUTO-MCNC: 296 MG/DL (ref 70–110)
GLUCOSE BLD STRIP.AUTO-MCNC: 316 MG/DL (ref 70–110)
GLUCOSE BLD STRIP.AUTO-MCNC: 323 MG/DL (ref 70–110)
GLUCOSE BLD STRIP.AUTO-MCNC: 326 MG/DL (ref 70–110)
GLUCOSE BLD STRIP.AUTO-MCNC: 331 MG/DL (ref 70–110)
GLUCOSE BLD STRIP.AUTO-MCNC: 352 MG/DL (ref 70–110)
GLUCOSE SERPL-MCNC: 202 MG/DL (ref 74–99)
HCT VFR BLD AUTO: 26.2 % (ref 35–45)
HGB BLD-MCNC: 8.3 G/DL (ref 12–16)
IMM GRANULOCYTES # BLD AUTO: 0 K/UL (ref 0–0.04)
IMM GRANULOCYTES NFR BLD AUTO: 0 % (ref 0–0.5)
LYMPHOCYTES # BLD: 0.6 K/UL (ref 0.9–3.6)
LYMPHOCYTES NFR BLD: 8 % (ref 21–52)
MAGNESIUM SERPL-MCNC: 2.2 MG/DL (ref 1.6–2.6)
MCH RBC QN AUTO: 29.4 PG (ref 24–34)
MCHC RBC AUTO-ENTMCNC: 31.7 G/DL (ref 31–37)
MCV RBC AUTO: 92.9 FL (ref 78–100)
MONOCYTES # BLD: 0.3 K/UL (ref 0.05–1.2)
MONOCYTES NFR BLD: 4 % (ref 3–10)
NEUTS SEG # BLD: 6.6 K/UL (ref 1.8–8)
NEUTS SEG NFR BLD: 88 % (ref 40–73)
NRBC # BLD: 0.71 K/UL (ref 0–0.01)
NRBC BLD-RTO: 9.4 PER 100 WBC
PHOSPHATE SERPL-MCNC: 3 MG/DL (ref 2.5–4.9)
PLATELET # BLD AUTO: 183 K/UL (ref 135–420)
PLATELET COMMENT: ABNORMAL
PMV BLD AUTO: 10.2 FL (ref 9.2–11.8)
POTASSIUM SERPL-SCNC: 4.2 MMOL/L (ref 3.5–5.5)
PROT SERPL-MCNC: 6.1 G/DL (ref 6.4–8.2)
RBC # BLD AUTO: 2.82 M/UL (ref 4.2–5.3)
RBC MORPH BLD: ABNORMAL
RBC MORPH BLD: ABNORMAL
SODIUM SERPL-SCNC: 131 MMOL/L (ref 136–145)
WBC # BLD AUTO: 7.5 K/UL (ref 4.6–13.2)

## 2024-04-28 PROCEDURE — 94640 AIRWAY INHALATION TREATMENT: CPT

## 2024-04-28 PROCEDURE — 85025 COMPLETE CBC W/AUTO DIFF WBC: CPT

## 2024-04-28 PROCEDURE — 6370000000 HC RX 637 (ALT 250 FOR IP): Performed by: INTERNAL MEDICINE

## 2024-04-28 PROCEDURE — 80053 COMPREHEN METABOLIC PANEL: CPT

## 2024-04-28 PROCEDURE — 97164 PT RE-EVAL EST PLAN CARE: CPT

## 2024-04-28 PROCEDURE — 6370000000 HC RX 637 (ALT 250 FOR IP): Performed by: STUDENT IN AN ORGANIZED HEALTH CARE EDUCATION/TRAINING PROGRAM

## 2024-04-28 PROCEDURE — 6360000002 HC RX W HCPCS: Performed by: INTERNAL MEDICINE

## 2024-04-28 PROCEDURE — 97530 THERAPEUTIC ACTIVITIES: CPT

## 2024-04-28 PROCEDURE — 99232 SBSQ HOSP IP/OBS MODERATE 35: CPT | Performed by: INTERNAL MEDICINE

## 2024-04-28 PROCEDURE — 94761 N-INVAS EAR/PLS OXIMETRY MLT: CPT

## 2024-04-28 PROCEDURE — 83735 ASSAY OF MAGNESIUM: CPT

## 2024-04-28 PROCEDURE — 6370000000 HC RX 637 (ALT 250 FOR IP): Performed by: PHYSICIAN ASSISTANT

## 2024-04-28 PROCEDURE — 6370000000 HC RX 637 (ALT 250 FOR IP): Performed by: NURSE PRACTITIONER

## 2024-04-28 PROCEDURE — 6370000000 HC RX 637 (ALT 250 FOR IP): Performed by: EMERGENCY MEDICINE

## 2024-04-28 PROCEDURE — 2580000003 HC RX 258: Performed by: STUDENT IN AN ORGANIZED HEALTH CARE EDUCATION/TRAINING PROGRAM

## 2024-04-28 PROCEDURE — 2580000003 HC RX 258: Performed by: INTERNAL MEDICINE

## 2024-04-28 PROCEDURE — 84100 ASSAY OF PHOSPHORUS: CPT

## 2024-04-28 PROCEDURE — 6370000000 HC RX 637 (ALT 250 FOR IP): Performed by: HOSPITALIST

## 2024-04-28 PROCEDURE — 97168 OT RE-EVAL EST PLAN CARE: CPT

## 2024-04-28 PROCEDURE — 1100000003 HC PRIVATE W/ TELEMETRY

## 2024-04-28 PROCEDURE — 2500000003 HC RX 250 WO HCPCS: Performed by: REGISTERED NURSE

## 2024-04-28 PROCEDURE — 36415 COLL VENOUS BLD VENIPUNCTURE: CPT

## 2024-04-28 PROCEDURE — 82962 GLUCOSE BLOOD TEST: CPT

## 2024-04-28 RX ORDER — AMIODARONE HYDROCHLORIDE 200 MG/1
200 TABLET ORAL DAILY
Status: DISCONTINUED | OUTPATIENT
Start: 2024-04-28 | End: 2024-05-07 | Stop reason: HOSPADM

## 2024-04-28 RX ORDER — TRAMADOL HYDROCHLORIDE 50 MG/1
50 TABLET ORAL ONCE
Status: COMPLETED | OUTPATIENT
Start: 2024-04-28 | End: 2024-04-28

## 2024-04-28 RX ORDER — INSULIN LISPRO 100 [IU]/ML
0-8 INJECTION, SOLUTION INTRAVENOUS; SUBCUTANEOUS
Status: DISCONTINUED | OUTPATIENT
Start: 2024-04-28 | End: 2024-04-28

## 2024-04-28 RX ORDER — INSULIN LISPRO 100 [IU]/ML
0-8 INJECTION, SOLUTION INTRAVENOUS; SUBCUTANEOUS
Status: DISCONTINUED | OUTPATIENT
Start: 2024-04-28 | End: 2024-05-01

## 2024-04-28 RX ADMIN — ARFORMOTEROL TARTRATE 15 MCG: 15 SOLUTION RESPIRATORY (INHALATION) at 07:31

## 2024-04-28 RX ADMIN — CHOLECALCIFEROL TAB 25 MCG (1000 UNIT) 2000 UNITS: 25 TAB at 08:37

## 2024-04-28 RX ADMIN — HEPARIN SODIUM 5000 UNITS: 5000 INJECTION INTRAVENOUS; SUBCUTANEOUS at 22:22

## 2024-04-28 RX ADMIN — INSULIN LISPRO 4 UNITS: 100 INJECTION, SOLUTION INTRAVENOUS; SUBCUTANEOUS at 06:28

## 2024-04-28 RX ADMIN — GABAPENTIN 300 MG: 300 CAPSULE ORAL at 17:54

## 2024-04-28 RX ADMIN — BUDESONIDE 1 MG: 1 SUSPENSION RESPIRATORY (INHALATION) at 19:16

## 2024-04-28 RX ADMIN — HEPARIN SODIUM 5000 UNITS: 5000 INJECTION INTRAVENOUS; SUBCUTANEOUS at 06:28

## 2024-04-28 RX ADMIN — SEVELAMER CARBONATE 800 MG: 800 TABLET, FILM COATED ORAL at 11:25

## 2024-04-28 RX ADMIN — SODIUM CHLORIDE, PRESERVATIVE FREE 10 ML: 5 INJECTION INTRAVENOUS at 20:31

## 2024-04-28 RX ADMIN — IPRATROPIUM BROMIDE AND ALBUTEROL SULFATE 1 DOSE: .5; 3 SOLUTION RESPIRATORY (INHALATION) at 07:31

## 2024-04-28 RX ADMIN — POLYVINYL ALCOHOL, POVIDONE 1 DROP: 14; 6 SOLUTION/ DROPS OPHTHALMIC at 17:59

## 2024-04-28 RX ADMIN — Medication 1 MG: at 08:37

## 2024-04-28 RX ADMIN — CYANOCOBALAMIN TAB 1000 MCG 1000 MCG: 1000 TAB at 08:37

## 2024-04-28 RX ADMIN — AMIODARONE HYDROCHLORIDE 0.5 MG/MIN: 1.8 INJECTION, SOLUTION INTRAVENOUS at 01:30

## 2024-04-28 RX ADMIN — INSULIN LISPRO 6 UNITS: 100 INJECTION, SOLUTION INTRAVENOUS; SUBCUTANEOUS at 16:03

## 2024-04-28 RX ADMIN — INSULIN LISPRO 6 UNITS: 100 INJECTION, SOLUTION INTRAVENOUS; SUBCUTANEOUS at 11:25

## 2024-04-28 RX ADMIN — ASPIRIN 81 MG CHEWABLE TABLET 81 MG: 81 TABLET CHEWABLE at 08:37

## 2024-04-28 RX ADMIN — HEPARIN SODIUM 5000 UNITS: 5000 INJECTION INTRAVENOUS; SUBCUTANEOUS at 15:00

## 2024-04-28 RX ADMIN — AMOXICILLIN AND CLAVULANATE POTASSIUM 1 TABLET: 500; 125 TABLET, FILM COATED ORAL at 20:30

## 2024-04-28 RX ADMIN — IPRATROPIUM BROMIDE AND ALBUTEROL SULFATE 1 DOSE: .5; 3 SOLUTION RESPIRATORY (INHALATION) at 19:16

## 2024-04-28 RX ADMIN — MIDODRINE HYDROCHLORIDE 5 MG: 5 TABLET ORAL at 08:37

## 2024-04-28 RX ADMIN — IPRATROPIUM BROMIDE AND ALBUTEROL SULFATE 1 DOSE: .5; 3 SOLUTION RESPIRATORY (INHALATION) at 15:14

## 2024-04-28 RX ADMIN — BUDESONIDE 1 MG: 1 SUSPENSION RESPIRATORY (INHALATION) at 07:32

## 2024-04-28 RX ADMIN — SEVELAMER CARBONATE 800 MG: 800 TABLET, FILM COATED ORAL at 08:37

## 2024-04-28 RX ADMIN — ARFORMOTEROL TARTRATE 15 MCG: 15 SOLUTION RESPIRATORY (INHALATION) at 19:16

## 2024-04-28 RX ADMIN — AMOXICILLIN AND CLAVULANATE POTASSIUM 1 TABLET: 500; 125 TABLET, FILM COATED ORAL at 08:37

## 2024-04-28 RX ADMIN — SODIUM CHLORIDE, PRESERVATIVE FREE 10 ML: 5 INJECTION INTRAVENOUS at 08:38

## 2024-04-28 RX ADMIN — EZETIMIBE 10 MG: 10 TABLET ORAL at 08:37

## 2024-04-28 RX ADMIN — IPRATROPIUM BROMIDE AND ALBUTEROL SULFATE 1 DOSE: .5; 3 SOLUTION RESPIRATORY (INHALATION) at 23:15

## 2024-04-28 RX ADMIN — FOLIC ACID 1 MG: 1 TABLET ORAL at 08:37

## 2024-04-28 RX ADMIN — TRAMADOL HYDROCHLORIDE 50 MG: 50 TABLET ORAL at 20:30

## 2024-04-28 RX ADMIN — INSULIN LISPRO 8 UNITS: 100 INJECTION, SOLUTION INTRAVENOUS; SUBCUTANEOUS at 20:47

## 2024-04-28 RX ADMIN — SEVELAMER CARBONATE 800 MG: 800 TABLET, FILM COATED ORAL at 15:42

## 2024-04-28 RX ADMIN — AMIODARONE HYDROCHLORIDE 200 MG: 200 TABLET ORAL at 08:43

## 2024-04-28 RX ADMIN — WATER 20 MG: 1 INJECTION INTRAMUSCULAR; INTRAVENOUS; SUBCUTANEOUS at 20:29

## 2024-04-28 RX ADMIN — MIDODRINE HYDROCHLORIDE 5 MG: 5 TABLET ORAL at 15:42

## 2024-04-28 RX ADMIN — ATORVASTATIN CALCIUM 80 MG: 40 TABLET, FILM COATED ORAL at 20:30

## 2024-04-28 RX ADMIN — PANTOPRAZOLE SODIUM 40 MG: 40 TABLET, DELAYED RELEASE ORAL at 06:28

## 2024-04-28 RX ADMIN — WATER 20 MG: 1 INJECTION INTRAMUSCULAR; INTRAVENOUS; SUBCUTANEOUS at 08:37

## 2024-04-28 RX ADMIN — INSULIN GLARGINE 15 UNITS: 100 INJECTION, SOLUTION SUBCUTANEOUS at 20:36

## 2024-04-28 ASSESSMENT — PAIN DESCRIPTION - ONSET: ONSET: ON-GOING

## 2024-04-28 ASSESSMENT — PAIN SCALES - WONG BAKER: WONGBAKER_NUMERICALRESPONSE: HURTS A LITTLE BIT

## 2024-04-28 ASSESSMENT — PAIN DESCRIPTION - FREQUENCY: FREQUENCY: INTERMITTENT

## 2024-04-28 ASSESSMENT — PAIN DESCRIPTION - PAIN TYPE: TYPE: CHRONIC PAIN

## 2024-04-28 ASSESSMENT — PAIN SCALES - GENERAL
PAINLEVEL_OUTOF10: 10
PAINLEVEL_OUTOF10: 5
PAINLEVEL_OUTOF10: 0

## 2024-04-28 ASSESSMENT — PAIN DESCRIPTION - DESCRIPTORS: DESCRIPTORS: SHARP

## 2024-04-28 ASSESSMENT — PAIN DESCRIPTION - LOCATION: LOCATION: BACK

## 2024-04-28 ASSESSMENT — PAIN DESCRIPTION - ORIENTATION: ORIENTATION: POSTERIOR;MID

## 2024-04-28 ASSESSMENT — PAIN - FUNCTIONAL ASSESSMENT: PAIN_FUNCTIONAL_ASSESSMENT: PREVENTS OR INTERFERES SOME ACTIVE ACTIVITIES AND ADLS

## 2024-04-28 NOTE — CONSULTS
Consult Note  Consult requested by: dr cole Donahue is a 73 y.o. female Black /  who is being seen on consult for esrd  Chief Complaint   Patient presents with    Respiratory Distress     Admission diagnosis: COPD exacerbation ([facility]) [J44.1]  End stage renal disease on dialysis ([facility]) [N18.6, Z99.2]  Acute respiratory failure with hypoxia ([facility]) [J96.01]      HPI: 73 y o  female admitted with resp distress,hx of esrd,copd,chf.pt was seen yesterday in er for falling,was dialyzed with 2 liters removed.frequent admissions to the hospital for copd and chf  Past Medical History:   Diagnosis Date    Acute exacerbation of CHF (congestive heart failure) ([facility]) 07/08/2022    Acute on chronic diastolic heart failure ([facility]) 08/08/2022    Acute on chronic heart failure with reduced ejection fraction and diastolic dysfunction ([facility]) 08/05/2023    Anemia     Chondromalacia of both patellae     Chronic heart failure with preserved ejection fraction ([facility]) 01/13/2023    Chronic kidney disease     COPD (chronic obstructive pulmonary disease) ([facility]) 09/2015    mild-mod dz; Dr DEBORAH Yepez    Coronary artery disease involving native coronary artery with unstable angina pectoris ([facility]) 06/09/2018    COVID-19 04/06/2024    Diabetes ([facility]) 2013    Dilated cardiomyopathy ([facility])     Dyslipidemia     Flash pulmonary edema ([facility]) 10/18/2019    Gout     Heart attack ([facility]) 10/18/2019    Stented    Hypercholesteremia 01/08/2014    Hypertension 2000    Hypertensive emergency 07/27/2022    Mild protein-calorie malnutrition ([facility]) 08/07/2023    Mixed simple and mucopurulent chronic bronchitis ([facility]) 04/06/2024    Obesity     Orthostatic hypotension 05/17/2016    Osteoarthritis of both knees     Paroxysmal supraventricular tachycardia ([facility]) 04/06/2024    Pneumonia due to COVID-19 virus 08/08/2022    Popliteal cyst     Postmenopausal bleeding 07/09/2020    Pulmonary edema cardiac cause ([facility]) 10/19/2019    Sleep apnea  
   [facility], [facility]  [address]  [place] [address]  Phone:[phone]  Fax: [phone]  Cardiology Consult Note   Admission Date & Time  4/6/2024  8:57 AM    Requesting Physician: Aldo Olivares DO (requesting physician)    Reason for Consult: atrial fibrillation with rapid ventricular response    HPI: Name:     Christy Donahue          Age:         73 y.o.           Gender:   female          Ethnicity:      Ms. Donahue pleasant lady who is referred for evaluation management of atrial fibrillation with rapid ventricular response.  She had pending discharge.  Clinical course has been complicated by acute respiratory failure.  She is currently on nasal cannula.    She complains of dyspnea on and off.  She is sitting upright in bed.  She denies complaint of chest pain.  She complains of intermittent subjective palpitations which she describes as fast.  She states she can walk about a block.  She denies complaint of syncope or near syncope.    EKGs reviewed April 21 possible atrial flutter at a heart rate of 150 bpm.  April 22 probable SVT at 200 bpm inferior lateral ST segment depression consistent with ischemia.  Laboratory hemoglobin 8.3 hematocrit 26 platelets 183 white blood count 7.5 BUN 57 creatinine 5.1 potassium 4.2 telemetry shows atrial fibrillation heart rate 100 bpm.  Previous HCT 22.5.        Current Medications:  Current Facility-Administered Medications   Medication Dose Route Frequency    amiodarone (CORDARONE) tablet 200 mg  200 mg Oral Daily    insulin lispro (HUMALOG) injection vial 0-8 Units  0-8 Units SubCUTAneous 4x Daily AC & HS    pantoprazole (PROTONIX) tablet 40 mg  40 mg Oral QAM AC    amoxicillin-clavulanate (AUGMENTIN) 500-125 MG per tablet 1 tablet  1 tablet Oral 2 times per day    0.9 % sodium chloride infusion   IntraVENous PRN    polyethylene glycol (GLYCOLAX) packet 34 g  34 g Oral Daily    methylPREDNISolone sodium 
[facility].  Pulmonary, Critical Care, and Sleep Medicine    Initial Patient Consult    Name: Christy Donahue MRN: [id number]   : 1950 Hospital: [facility]   Date: 4/10/2024        IMPRESSION:   Acute hypoxic respiratory failure: likely due to pulmonary edema.  COPD/Pulmonary emphysema: likely producing a small component of symptoms.  CHF with systolic, diastolic and valvular dysfunction.  Patient has had multiple weeks admissions for decompensated CHF and pulm edema.  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 Dillon. Noncompliant.  Chronic pain syndrome  Pulmonary nodule: 4.5 mm RLL nodule along with mediastinal adenopathy.  Hyponatremia  History of tobacco abuse: Quit in ; >50 pack-year history.  H/o Breast & Rectal Ca.  DM  ESRD on HD  H/o CVA     Patient Active Problem List   Diagnosis    Environmental allergies    MICHAEL on CPAP    Chronic pain syndrome    Osteoarthritis of both knees    Coronary artery disease involving native coronary artery of native heart without angina pectoris    Chronic obstructive pulmonary disease (HCC)    Bilateral shoulder pain    CAD (coronary artery disease)    Compliance with medication regimen    Chronic pain of both knees    Ischemic cardiomyopathy    Hyperlipidemia    Iron deficiency anemia    Former smoker    Moderate mitral valve regurgitation    Cardiomyopathy, dilated (HCC)    COPD with acute exacerbation (HCC)    Primary osteoarthritis of both knees    Decreased GFR    Hypertension    Breast cancer (HCC)    Rectal cancer (HCC)    History of myocardial infarction    Pulmonary hypertension (HCC)    CHF (congestive heart failure) (HCC)    CVA (cerebral vascular accident) (HCC)    End-stage renal disease on hemodialysis (HCC)    Stenosis of both vertebral arteries    Type 2 diabetes mellitus with hyperglycemia, with 
-39.9)    Estimated Daily Nutrient Needs:  Energy Requirements Based On: Formula  Weight Used for Energy Requirements: Admission  Energy (kcal/day): 6244-7164 ([facility] 2003b x 0.9-1.1)  Weight Used for Protein Requirements: Ideal  Protein (g/day): 72-95 (1.5-2)  Method Used for Fluid Requirements: Standard Renal  Fluid (ml/day): 500-1000 + UOP    Nutrition Diagnosis:   Increased nutrient needs related to increase demand for energy/nutrients as evidenced by dialysis  Inadequate protein-energy intake related to acute injury/trauma, increase demand for energy/nutrients, impaired respiratory function as evidenced by NPO or clear liquid status due to medical condition (fluctuating PO on floor, now intubated, to initiate trickle TF)    Nutrition Interventions:   Food and/or Nutrient Delivery: Continue NPO, Start Tube Feeding  Nutrition Education/Counseling: No recommendation at this time  Coordination of Nutrition Care: Continue to monitor while inpatient  Plan of Care discussed with: NP    Goals:  Previous Goal Met: No Progress toward Goal(s)  Goals: Meet at least 75% of estimated needs, by next RD assessment       Nutrition Monitoring and Evaluation:   Behavioral-Environmental Outcomes: None Identified  Food/Nutrient Intake Outcomes: Enteral Nutrition Intake/Tolerance  Physical Signs/Symptoms Outcomes: Biochemical Data, GI Status, Constipation, Nausea or Vomiting, Fluid Status or Edema, Hemodynamic Status, Skin, Weight    Discharge Planning:    Too soon to determine     Megan Dockweiler, MS, RD, [facility]  Contact: [phone]     
vital system functions, to treat this degree vital organ system failure and to prevent further life threatening deterioration of the patient’s condition.        Iliana Mendoza PA-C  04/21/24  Pulmonary, Critical Care Medicine  [facility]

## 2024-04-29 ENCOUNTER — PREP FOR PROCEDURE (OUTPATIENT)
Age: 74
End: 2024-04-29

## 2024-04-29 LAB
ALBUMIN SERPL-MCNC: 2.9 G/DL (ref 3.4–5)
ALBUMIN/GLOB SERPL: 0.9 (ref 0.8–1.7)
ALP SERPL-CCNC: 139 U/L (ref 45–117)
ALT SERPL-CCNC: 45 U/L (ref 13–56)
ANION GAP SERPL CALC-SCNC: 12 MMOL/L (ref 3–18)
AST SERPL-CCNC: 19 U/L (ref 10–38)
BASOPHILS # BLD: 0 K/UL (ref 0–0.1)
BASOPHILS NFR BLD: 0 % (ref 0–2)
BILIRUB SERPL-MCNC: 1.1 MG/DL (ref 0.2–1)
BUN SERPL-MCNC: 88 MG/DL (ref 7–18)
BUN/CREAT SERPL: 14 (ref 12–20)
CALCIUM SERPL-MCNC: 9 MG/DL (ref 8.5–10.1)
CHLORIDE SERPL-SCNC: 90 MMOL/L (ref 100–111)
CO2 SERPL-SCNC: 22 MMOL/L (ref 21–32)
CREAT SERPL-MCNC: 6.41 MG/DL (ref 0.6–1.3)
DIFFERENTIAL METHOD BLD: ABNORMAL
EOSINOPHIL # BLD: 0 K/UL (ref 0–0.4)
EOSINOPHIL NFR BLD: 0 % (ref 0–5)
ERYTHROCYTE [DISTWIDTH] IN BLOOD BY AUTOMATED COUNT: 18.6 % (ref 11.6–14.5)
GLOBULIN SER CALC-MCNC: 3.1 G/DL (ref 2–4)
GLUCOSE BLD STRIP.AUTO-MCNC: 247 MG/DL (ref 70–110)
GLUCOSE BLD STRIP.AUTO-MCNC: 259 MG/DL (ref 70–110)
GLUCOSE BLD STRIP.AUTO-MCNC: 343 MG/DL (ref 70–110)
GLUCOSE BLD STRIP.AUTO-MCNC: 378 MG/DL (ref 70–110)
GLUCOSE SERPL-MCNC: 222 MG/DL (ref 74–99)
HCT VFR BLD AUTO: 24.2 % (ref 35–45)
HGB BLD-MCNC: 7.9 G/DL (ref 12–16)
IMM GRANULOCYTES # BLD AUTO: 0 K/UL
IMM GRANULOCYTES NFR BLD AUTO: 0 %
LYMPHOCYTES # BLD: 0.6 K/UL (ref 0.9–3.6)
LYMPHOCYTES NFR BLD: 8 % (ref 21–52)
MAGNESIUM SERPL-MCNC: 2.3 MG/DL (ref 1.6–2.6)
MCH RBC QN AUTO: 30.3 PG (ref 24–34)
MCHC RBC AUTO-ENTMCNC: 32.6 G/DL (ref 31–37)
MCV RBC AUTO: 92.7 FL (ref 78–100)
MONOCYTES # BLD: 0.1 K/UL (ref 0.05–1.2)
MONOCYTES NFR BLD: 1 % (ref 3–10)
NEUTS BAND NFR BLD MANUAL: 3 % (ref 0–5)
NEUTS SEG # BLD: 7.1 K/UL (ref 1.8–8)
NEUTS SEG NFR BLD: 86 % (ref 40–73)
NRBC # BLD: 0.46 K/UL (ref 0–0.01)
NRBC BLD-RTO: 5.8 PER 100 WBC
OTHER CELLS NFR BLD MANUAL: 2
PHOSPHATE SERPL-MCNC: 2.5 MG/DL (ref 2.5–4.9)
PLATELET # BLD AUTO: 185 K/UL (ref 135–420)
PLATELET COMMENT: ABNORMAL
PMV BLD AUTO: 11.2 FL (ref 9.2–11.8)
POTASSIUM SERPL-SCNC: 5.7 MMOL/L (ref 3.5–5.5)
PROT SERPL-MCNC: 6 G/DL (ref 6.4–8.2)
RBC # BLD AUTO: 2.61 M/UL (ref 4.2–5.3)
RBC MORPH BLD: ABNORMAL
SODIUM SERPL-SCNC: 124 MMOL/L (ref 136–145)
WBC # BLD AUTO: 8 K/UL (ref 4.6–13.2)

## 2024-04-29 PROCEDURE — 6370000000 HC RX 637 (ALT 250 FOR IP): Performed by: PHYSICIAN ASSISTANT

## 2024-04-29 PROCEDURE — 6370000000 HC RX 637 (ALT 250 FOR IP): Performed by: STUDENT IN AN ORGANIZED HEALTH CARE EDUCATION/TRAINING PROGRAM

## 2024-04-29 PROCEDURE — 82962 GLUCOSE BLOOD TEST: CPT

## 2024-04-29 PROCEDURE — 85025 COMPLETE CBC W/AUTO DIFF WBC: CPT

## 2024-04-29 PROCEDURE — 6370000000 HC RX 637 (ALT 250 FOR IP): Performed by: EMERGENCY MEDICINE

## 2024-04-29 PROCEDURE — 90935 HEMODIALYSIS ONE EVALUATION: CPT

## 2024-04-29 PROCEDURE — 6370000000 HC RX 637 (ALT 250 FOR IP): Performed by: INTERNAL MEDICINE

## 2024-04-29 PROCEDURE — 84100 ASSAY OF PHOSPHORUS: CPT

## 2024-04-29 PROCEDURE — 36415 COLL VENOUS BLD VENIPUNCTURE: CPT

## 2024-04-29 PROCEDURE — 83735 ASSAY OF MAGNESIUM: CPT

## 2024-04-29 PROCEDURE — 2700000000 HC OXYGEN THERAPY PER DAY

## 2024-04-29 PROCEDURE — 94761 N-INVAS EAR/PLS OXIMETRY MLT: CPT

## 2024-04-29 PROCEDURE — 99232 SBSQ HOSP IP/OBS MODERATE 35: CPT | Performed by: INTERNAL MEDICINE

## 2024-04-29 PROCEDURE — 6360000002 HC RX W HCPCS: Performed by: INTERNAL MEDICINE

## 2024-04-29 PROCEDURE — 1100000003 HC PRIVATE W/ TELEMETRY

## 2024-04-29 PROCEDURE — 6370000000 HC RX 637 (ALT 250 FOR IP): Performed by: HOSPITALIST

## 2024-04-29 PROCEDURE — 2580000003 HC RX 258: Performed by: STUDENT IN AN ORGANIZED HEALTH CARE EDUCATION/TRAINING PROGRAM

## 2024-04-29 PROCEDURE — 94640 AIRWAY INHALATION TREATMENT: CPT

## 2024-04-29 PROCEDURE — 2580000003 HC RX 258: Performed by: INTERNAL MEDICINE

## 2024-04-29 PROCEDURE — 80053 COMPREHEN METABOLIC PANEL: CPT

## 2024-04-29 RX ORDER — HEPARIN SODIUM 1000 [USP'U]/ML
4000 INJECTION, SOLUTION INTRAVENOUS; SUBCUTANEOUS ONCE
Status: DISCONTINUED | OUTPATIENT
Start: 2024-04-29 | End: 2024-04-29

## 2024-04-29 RX ORDER — HEPARIN SODIUM 1000 [USP'U]/ML
2000 INJECTION, SOLUTION INTRAVENOUS; SUBCUTANEOUS PRN
Status: DISCONTINUED | OUTPATIENT
Start: 2024-04-29 | End: 2024-04-29

## 2024-04-29 RX ORDER — HEPARIN SODIUM 1000 [USP'U]/ML
4000 INJECTION, SOLUTION INTRAVENOUS; SUBCUTANEOUS PRN
Status: DISCONTINUED | OUTPATIENT
Start: 2024-04-29 | End: 2024-04-29

## 2024-04-29 RX ORDER — HEPARIN SODIUM 10000 [USP'U]/100ML
5-30 INJECTION, SOLUTION INTRAVENOUS CONTINUOUS
Status: DISCONTINUED | OUTPATIENT
Start: 2024-04-29 | End: 2024-04-29

## 2024-04-29 RX ORDER — METOPROLOL SUCCINATE 25 MG/1
25 TABLET, EXTENDED RELEASE ORAL 2 TIMES DAILY
Status: DISCONTINUED | OUTPATIENT
Start: 2024-04-29 | End: 2024-05-07 | Stop reason: HOSPADM

## 2024-04-29 RX ORDER — HEPARIN SODIUM 5000 [USP'U]/ML
5000 INJECTION, SOLUTION INTRAVENOUS; SUBCUTANEOUS EVERY 8 HOURS SCHEDULED
Status: DISCONTINUED | OUTPATIENT
Start: 2024-04-29 | End: 2024-05-07 | Stop reason: HOSPADM

## 2024-04-29 RX ORDER — COSYNTROPIN 0.25 MG/ML
250 INJECTION, POWDER, FOR SOLUTION INTRAMUSCULAR; INTRAVENOUS ONCE
Status: COMPLETED | OUTPATIENT
Start: 2024-04-30 | End: 2024-04-30

## 2024-04-29 RX ORDER — TRAMADOL HYDROCHLORIDE 50 MG/1
50 TABLET ORAL ONCE
Status: COMPLETED | OUTPATIENT
Start: 2024-04-29 | End: 2024-04-29

## 2024-04-29 RX ADMIN — IPRATROPIUM BROMIDE AND ALBUTEROL SULFATE 1 DOSE: .5; 3 SOLUTION RESPIRATORY (INHALATION) at 15:47

## 2024-04-29 RX ADMIN — Medication 1 MG: at 08:15

## 2024-04-29 RX ADMIN — PANTOPRAZOLE SODIUM 40 MG: 40 TABLET, DELAYED RELEASE ORAL at 06:36

## 2024-04-29 RX ADMIN — POLYVINYL ALCOHOL, POVIDONE 1 DROP: 14; 6 SOLUTION/ DROPS OPHTHALMIC at 15:53

## 2024-04-29 RX ADMIN — BUDESONIDE 1 MG: 1 SUSPENSION RESPIRATORY (INHALATION) at 20:03

## 2024-04-29 RX ADMIN — BUDESONIDE 1 MG: 1 SUSPENSION RESPIRATORY (INHALATION) at 07:13

## 2024-04-29 RX ADMIN — ATORVASTATIN CALCIUM 80 MG: 40 TABLET, FILM COATED ORAL at 21:33

## 2024-04-29 RX ADMIN — EZETIMIBE 10 MG: 10 TABLET ORAL at 08:15

## 2024-04-29 RX ADMIN — ARFORMOTEROL TARTRATE 15 MCG: 15 SOLUTION RESPIRATORY (INHALATION) at 07:13

## 2024-04-29 RX ADMIN — ALLOPURINOL 100 MG: 100 TABLET ORAL at 21:32

## 2024-04-29 RX ADMIN — FOLIC ACID 1 MG: 1 TABLET ORAL at 08:16

## 2024-04-29 RX ADMIN — SODIUM CHLORIDE, PRESERVATIVE FREE 10 ML: 5 INJECTION INTRAVENOUS at 08:56

## 2024-04-29 RX ADMIN — INSULIN GLARGINE 15 UNITS: 100 INJECTION, SOLUTION SUBCUTANEOUS at 21:34

## 2024-04-29 RX ADMIN — SEVELAMER CARBONATE 800 MG: 800 TABLET, FILM COATED ORAL at 17:23

## 2024-04-29 RX ADMIN — AMOXICILLIN AND CLAVULANATE POTASSIUM 1 TABLET: 500; 125 TABLET, FILM COATED ORAL at 08:16

## 2024-04-29 RX ADMIN — WATER 20 MG: 1 INJECTION INTRAMUSCULAR; INTRAVENOUS; SUBCUTANEOUS at 21:33

## 2024-04-29 RX ADMIN — SEVELAMER CARBONATE 800 MG: 800 TABLET, FILM COATED ORAL at 14:02

## 2024-04-29 RX ADMIN — IPRATROPIUM BROMIDE AND ALBUTEROL SULFATE 1 DOSE: .5; 3 SOLUTION RESPIRATORY (INHALATION) at 03:46

## 2024-04-29 RX ADMIN — METOPROLOL SUCCINATE 25 MG: 25 TABLET, EXTENDED RELEASE ORAL at 14:03

## 2024-04-29 RX ADMIN — TRAMADOL HYDROCHLORIDE 50 MG: 50 TABLET ORAL at 18:04

## 2024-04-29 RX ADMIN — EPOETIN ALFA-EPBX 6000 UNITS: 3000 INJECTION, SOLUTION INTRAVENOUS; SUBCUTANEOUS at 17:23

## 2024-04-29 RX ADMIN — IPRATROPIUM BROMIDE AND ALBUTEROL SULFATE 1 DOSE: .5; 3 SOLUTION RESPIRATORY (INHALATION) at 20:03

## 2024-04-29 RX ADMIN — INSULIN LISPRO 6 UNITS: 100 INJECTION, SOLUTION INTRAVENOUS; SUBCUTANEOUS at 17:23

## 2024-04-29 RX ADMIN — METOPROLOL SUCCINATE 25 MG: 25 TABLET, EXTENDED RELEASE ORAL at 21:32

## 2024-04-29 RX ADMIN — INSULIN LISPRO 8 UNITS: 100 INJECTION, SOLUTION INTRAVENOUS; SUBCUTANEOUS at 21:33

## 2024-04-29 RX ADMIN — AMOXICILLIN AND CLAVULANATE POTASSIUM 1 TABLET: 500; 125 TABLET, FILM COATED ORAL at 21:32

## 2024-04-29 RX ADMIN — INSULIN LISPRO 4 UNITS: 100 INJECTION, SOLUTION INTRAVENOUS; SUBCUTANEOUS at 08:15

## 2024-04-29 RX ADMIN — HEPARIN SODIUM 5000 UNITS: 5000 INJECTION INTRAVENOUS; SUBCUTANEOUS at 06:36

## 2024-04-29 RX ADMIN — HEPARIN SODIUM 5000 UNITS: 5000 INJECTION INTRAVENOUS; SUBCUTANEOUS at 21:33

## 2024-04-29 RX ADMIN — CYANOCOBALAMIN TAB 1000 MCG 1000 MCG: 1000 TAB at 08:16

## 2024-04-29 RX ADMIN — AMIODARONE HYDROCHLORIDE 200 MG: 200 TABLET ORAL at 08:16

## 2024-04-29 RX ADMIN — POLYVINYL ALCOHOL, POVIDONE 1 DROP: 14; 6 SOLUTION/ DROPS OPHTHALMIC at 08:55

## 2024-04-29 RX ADMIN — POLYVINYL ALCOHOL, POVIDONE 1 DROP: 14; 6 SOLUTION/ DROPS OPHTHALMIC at 23:50

## 2024-04-29 RX ADMIN — MIDODRINE HYDROCHLORIDE 5 MG: 5 TABLET ORAL at 21:33

## 2024-04-29 RX ADMIN — IPRATROPIUM BROMIDE AND ALBUTEROL SULFATE 1 DOSE: .5; 3 SOLUTION RESPIRATORY (INHALATION) at 07:13

## 2024-04-29 RX ADMIN — SODIUM CHLORIDE, PRESERVATIVE FREE 10 ML: 5 INJECTION INTRAVENOUS at 21:34

## 2024-04-29 RX ADMIN — ASPIRIN 81 MG CHEWABLE TABLET 81 MG: 81 TABLET CHEWABLE at 08:16

## 2024-04-29 RX ADMIN — POLYVINYL ALCOHOL, POVIDONE 1 DROP: 14; 6 SOLUTION/ DROPS OPHTHALMIC at 20:04

## 2024-04-29 RX ADMIN — POLYVINYL ALCOHOL, POVIDONE 1 DROP: 14; 6 SOLUTION/ DROPS OPHTHALMIC at 03:21

## 2024-04-29 RX ADMIN — ARFORMOTEROL TARTRATE 15 MCG: 15 SOLUTION RESPIRATORY (INHALATION) at 20:02

## 2024-04-29 RX ADMIN — GABAPENTIN 300 MG: 300 CAPSULE ORAL at 17:23

## 2024-04-29 RX ADMIN — CHOLECALCIFEROL TAB 25 MCG (1000 UNIT) 2000 UNITS: 25 TAB at 08:16

## 2024-04-29 RX ADMIN — WATER 20 MG: 1 INJECTION INTRAMUSCULAR; INTRAVENOUS; SUBCUTANEOUS at 14:01

## 2024-04-29 ASSESSMENT — PAIN SCALES - GENERAL
PAINLEVEL_OUTOF10: 10
PAINLEVEL_OUTOF10: 3
PAINLEVEL_OUTOF10: 10
PAINLEVEL_OUTOF10: 8
PAINLEVEL_OUTOF10: 10

## 2024-04-29 ASSESSMENT — PAIN DESCRIPTION - DESCRIPTORS
DESCRIPTORS: ACHING;SHARP
DESCRIPTORS: ACHING;SHARP
DESCRIPTORS: ACHING

## 2024-04-29 ASSESSMENT — PAIN DESCRIPTION - LOCATION
LOCATION: BACK

## 2024-04-29 ASSESSMENT — PAIN DESCRIPTION - PAIN TYPE
TYPE: CHRONIC PAIN
TYPE: CHRONIC PAIN

## 2024-04-29 ASSESSMENT — PAIN - FUNCTIONAL ASSESSMENT
PAIN_FUNCTIONAL_ASSESSMENT: PREVENTS OR INTERFERES SOME ACTIVE ACTIVITIES AND ADLS

## 2024-04-29 ASSESSMENT — PAIN DESCRIPTION - ORIENTATION
ORIENTATION: RIGHT

## 2024-04-29 NOTE — CARE COORDINATION
04/29/24 1048   /Social Work Whiteboard Notes   /Social Work Whiteboard Red:4/29- Not medically ready- [facility] will have a bed/chair available, will hold for patient- MARTY       Sw reached out to [facility] to check on bed availability. M[facility]a replied she will have a bed available on 5/5/24, The bed will be held for patient.     Tamia Villela, ANTHONY       [phone]

## 2024-04-29 NOTE — DIALYSIS
HD Care plan  Time: 3 hrs  Dialysate:  2 K+   2.5 Ca++  Bath  Net UF: 3 L  Access: Aseptically care for MELL  AVF  Hemodynamic stability: Maintain BP WNL     Pre Dialysis:  Pt received from Unit Nurse Hartley pt on a bed, A+O X 3, no s/s of acute distress noted. MELL AVF assessed, no abnormalities noted, bruit and thrill strong, AVF accessed using 15 G needles without any difficulty. Good flows achieved from both needles.  Intra Dialysis:  Time out / safety process performed per policy, Tx initiated at 0913.    AVF/AVG flowing with ease. For hemodynamic stability UF goal set at 3000 ml as tolerated.  Pt offered assistance with repositioning every 2 hours/prn    Vascular access visible and line connections remained intact throughout entire duration of treatment.   Vital signs checked every 15 mins.     Post Dialysis:  Tx completed at 1214,   Tolerated well , 3 L  removed. De-accessed per protocol.    Clot time 8 minutes for arterial, and 8 minutes for venous.   Dry dressings applied.  Bruit/Thrill present above and below dressings.  Post Dialysis report given to unit Nurse Odilia

## 2024-04-30 ENCOUNTER — APPOINTMENT (OUTPATIENT)
Facility: HOSPITAL | Age: 74
DRG: 208 | End: 2024-04-30
Payer: MEDICARE

## 2024-04-30 LAB
ALBUMIN SERPL-MCNC: 3 G/DL (ref 3.4–5)
ALBUMIN/GLOB SERPL: 1 (ref 0.8–1.7)
ALP SERPL-CCNC: 142 U/L (ref 45–117)
ALT SERPL-CCNC: 47 U/L (ref 13–56)
ANION GAP SERPL CALC-SCNC: 9 MMOL/L (ref 3–18)
APTT PPP: 22.9 SEC (ref 23–36.4)
AST SERPL-CCNC: 14 U/L (ref 10–38)
BASOPHILS # BLD: 0 K/UL (ref 0–0.1)
BASOPHILS NFR BLD: 0 % (ref 0–2)
BILIRUB SERPL-MCNC: 0.8 MG/DL (ref 0.2–1)
BUN SERPL-MCNC: 54 MG/DL (ref 7–18)
BUN/CREAT SERPL: 11 (ref 12–20)
CALCIUM SERPL-MCNC: 8.9 MG/DL (ref 8.5–10.1)
CHLORIDE SERPL-SCNC: 92 MMOL/L (ref 100–111)
CO2 SERPL-SCNC: 27 MMOL/L (ref 21–32)
CORTIS SERPL-MCNC: 2.9 UG/DL
CREAT SERPL-MCNC: 4.91 MG/DL (ref 0.6–1.3)
DIFFERENTIAL METHOD BLD: ABNORMAL
ECHO BSA: 1.88 M2
EOSINOPHIL # BLD: 0 K/UL (ref 0–0.4)
EOSINOPHIL NFR BLD: 0 % (ref 0–5)
ERYTHROCYTE [DISTWIDTH] IN BLOOD BY AUTOMATED COUNT: 18.7 % (ref 11.6–14.5)
GLOBULIN SER CALC-MCNC: 2.9 G/DL (ref 2–4)
GLUCOSE BLD STRIP.AUTO-MCNC: 211 MG/DL (ref 70–110)
GLUCOSE BLD STRIP.AUTO-MCNC: 332 MG/DL (ref 70–110)
GLUCOSE BLD STRIP.AUTO-MCNC: 333 MG/DL (ref 70–110)
GLUCOSE BLD STRIP.AUTO-MCNC: 352 MG/DL (ref 70–110)
GLUCOSE SERPL-MCNC: 270 MG/DL (ref 74–99)
HCT VFR BLD AUTO: 24 % (ref 35–45)
HGB BLD-MCNC: 7.6 G/DL (ref 12–16)
IMM GRANULOCYTES # BLD AUTO: 0.2 K/UL (ref 0–0.04)
IMM GRANULOCYTES NFR BLD AUTO: 2 % (ref 0–0.5)
LYMPHOCYTES # BLD: 0.4 K/UL (ref 0.9–3.6)
LYMPHOCYTES NFR BLD: 4 % (ref 21–52)
MAGNESIUM SERPL-MCNC: 2.1 MG/DL (ref 1.6–2.6)
MCH RBC QN AUTO: 29.7 PG (ref 24–34)
MCHC RBC AUTO-ENTMCNC: 31.7 G/DL (ref 31–37)
MCV RBC AUTO: 93.8 FL (ref 78–100)
MONOCYTES # BLD: 0.4 K/UL (ref 0.05–1.2)
MONOCYTES NFR BLD: 4 % (ref 3–10)
NEUTS SEG # BLD: 8.6 K/UL (ref 1.8–8)
NEUTS SEG NFR BLD: 90 % (ref 40–73)
NRBC # BLD: 0.37 K/UL (ref 0–0.01)
NRBC BLD-RTO: 3.9 PER 100 WBC
PHOSPHATE SERPL-MCNC: 2.3 MG/DL (ref 2.5–4.9)
PLATELET # BLD AUTO: 221 K/UL (ref 135–420)
PMV BLD AUTO: 10.6 FL (ref 9.2–11.8)
POTASSIUM SERPL-SCNC: 4.8 MMOL/L (ref 3.5–5.5)
PROT SERPL-MCNC: 5.9 G/DL (ref 6.4–8.2)
RBC # BLD AUTO: 2.56 M/UL (ref 4.2–5.3)
SODIUM SERPL-SCNC: 128 MMOL/L (ref 136–145)
WBC # BLD AUTO: 9.6 K/UL (ref 4.6–13.2)

## 2024-04-30 PROCEDURE — 97535 SELF CARE MNGMENT TRAINING: CPT

## 2024-04-30 PROCEDURE — 6370000000 HC RX 637 (ALT 250 FOR IP): Performed by: PHYSICIAN ASSISTANT

## 2024-04-30 PROCEDURE — 80053 COMPREHEN METABOLIC PANEL: CPT

## 2024-04-30 PROCEDURE — 6360000002 HC RX W HCPCS: Performed by: INTERNAL MEDICINE

## 2024-04-30 PROCEDURE — 94761 N-INVAS EAR/PLS OXIMETRY MLT: CPT

## 2024-04-30 PROCEDURE — 94640 AIRWAY INHALATION TREATMENT: CPT

## 2024-04-30 PROCEDURE — 2580000003 HC RX 258: Performed by: STUDENT IN AN ORGANIZED HEALTH CARE EDUCATION/TRAINING PROGRAM

## 2024-04-30 PROCEDURE — 93970 EXTREMITY STUDY: CPT

## 2024-04-30 PROCEDURE — 1100000003 HC PRIVATE W/ TELEMETRY

## 2024-04-30 PROCEDURE — 83735 ASSAY OF MAGNESIUM: CPT

## 2024-04-30 PROCEDURE — 85025 COMPLETE CBC W/AUTO DIFF WBC: CPT

## 2024-04-30 PROCEDURE — 6370000000 HC RX 637 (ALT 250 FOR IP): Performed by: EMERGENCY MEDICINE

## 2024-04-30 PROCEDURE — 97112 NEUROMUSCULAR REEDUCATION: CPT

## 2024-04-30 PROCEDURE — 6370000000 HC RX 637 (ALT 250 FOR IP): Performed by: STUDENT IN AN ORGANIZED HEALTH CARE EDUCATION/TRAINING PROGRAM

## 2024-04-30 PROCEDURE — 82533 TOTAL CORTISOL: CPT

## 2024-04-30 PROCEDURE — 6370000000 HC RX 637 (ALT 250 FOR IP): Performed by: HOSPITALIST

## 2024-04-30 PROCEDURE — 99232 SBSQ HOSP IP/OBS MODERATE 35: CPT | Performed by: INTERNAL MEDICINE

## 2024-04-30 PROCEDURE — 84100 ASSAY OF PHOSPHORUS: CPT

## 2024-04-30 PROCEDURE — 85730 THROMBOPLASTIN TIME PARTIAL: CPT

## 2024-04-30 PROCEDURE — 93970 EXTREMITY STUDY: CPT | Performed by: SURGERY

## 2024-04-30 PROCEDURE — 36415 COLL VENOUS BLD VENIPUNCTURE: CPT

## 2024-04-30 PROCEDURE — 6370000000 HC RX 637 (ALT 250 FOR IP): Performed by: INTERNAL MEDICINE

## 2024-04-30 PROCEDURE — 2580000003 HC RX 258: Performed by: INTERNAL MEDICINE

## 2024-04-30 PROCEDURE — 82962 GLUCOSE BLOOD TEST: CPT

## 2024-04-30 RX ORDER — TRAMADOL HYDROCHLORIDE 50 MG/1
50 TABLET ORAL EVERY 12 HOURS PRN
Status: DISCONTINUED | OUTPATIENT
Start: 2024-04-30 | End: 2024-05-07 | Stop reason: HOSPADM

## 2024-04-30 RX ADMIN — WATER 20 MG: 1 INJECTION INTRAMUSCULAR; INTRAVENOUS; SUBCUTANEOUS at 09:40

## 2024-04-30 RX ADMIN — FOLIC ACID 1 MG: 1 TABLET ORAL at 09:40

## 2024-04-30 RX ADMIN — IPRATROPIUM BROMIDE AND ALBUTEROL SULFATE 1 DOSE: .5; 3 SOLUTION RESPIRATORY (INHALATION) at 03:07

## 2024-04-30 RX ADMIN — ARFORMOTEROL TARTRATE 15 MCG: 15 SOLUTION RESPIRATORY (INHALATION) at 07:53

## 2024-04-30 RX ADMIN — POLYVINYL ALCOHOL, POVIDONE 1 DROP: 14; 6 SOLUTION/ DROPS OPHTHALMIC at 16:11

## 2024-04-30 RX ADMIN — IPRATROPIUM BROMIDE AND ALBUTEROL SULFATE 1 DOSE: .5; 3 SOLUTION RESPIRATORY (INHALATION) at 11:35

## 2024-04-30 RX ADMIN — INSULIN LISPRO 6 UNITS: 100 INJECTION, SOLUTION INTRAVENOUS; SUBCUTANEOUS at 16:47

## 2024-04-30 RX ADMIN — SODIUM CHLORIDE, PRESERVATIVE FREE 10 ML: 5 INJECTION INTRAVENOUS at 20:58

## 2024-04-30 RX ADMIN — COSYNTROPIN 250 MCG: 0.25 INJECTION, POWDER, LYOPHILIZED, FOR SOLUTION INTRAMUSCULAR; INTRAVENOUS at 09:41

## 2024-04-30 RX ADMIN — PANTOPRAZOLE SODIUM 40 MG: 40 TABLET, DELAYED RELEASE ORAL at 06:17

## 2024-04-30 RX ADMIN — POLYVINYL ALCOHOL, POVIDONE 1 DROP: 14; 6 SOLUTION/ DROPS OPHTHALMIC at 12:49

## 2024-04-30 RX ADMIN — CYANOCOBALAMIN TAB 1000 MCG 1000 MCG: 1000 TAB at 09:40

## 2024-04-30 RX ADMIN — BUDESONIDE 1 MG: 1 SUSPENSION RESPIRATORY (INHALATION) at 19:49

## 2024-04-30 RX ADMIN — INSULIN LISPRO 8 UNITS: 100 INJECTION, SOLUTION INTRAVENOUS; SUBCUTANEOUS at 20:57

## 2024-04-30 RX ADMIN — HEPARIN SODIUM 5000 UNITS: 5000 INJECTION INTRAVENOUS; SUBCUTANEOUS at 06:17

## 2024-04-30 RX ADMIN — INSULIN LISPRO 6 UNITS: 100 INJECTION, SOLUTION INTRAVENOUS; SUBCUTANEOUS at 12:49

## 2024-04-30 RX ADMIN — ARFORMOTEROL TARTRATE 15 MCG: 15 SOLUTION RESPIRATORY (INHALATION) at 19:49

## 2024-04-30 RX ADMIN — GABAPENTIN 300 MG: 300 CAPSULE ORAL at 17:54

## 2024-04-30 RX ADMIN — SEVELAMER CARBONATE 800 MG: 800 TABLET, FILM COATED ORAL at 09:40

## 2024-04-30 RX ADMIN — SEVELAMER CARBONATE 800 MG: 800 TABLET, FILM COATED ORAL at 12:49

## 2024-04-30 RX ADMIN — Medication 1 MG: at 09:39

## 2024-04-30 RX ADMIN — SEVELAMER CARBONATE 800 MG: 800 TABLET, FILM COATED ORAL at 17:54

## 2024-04-30 RX ADMIN — HEPARIN SODIUM 5000 UNITS: 5000 INJECTION INTRAVENOUS; SUBCUTANEOUS at 20:58

## 2024-04-30 RX ADMIN — AMOXICILLIN AND CLAVULANATE POTASSIUM 1 TABLET: 500; 125 TABLET, FILM COATED ORAL at 09:40

## 2024-04-30 RX ADMIN — BUDESONIDE 1 MG: 1 SUSPENSION RESPIRATORY (INHALATION) at 07:53

## 2024-04-30 RX ADMIN — IPRATROPIUM BROMIDE AND ALBUTEROL SULFATE 1 DOSE: .5; 3 SOLUTION RESPIRATORY (INHALATION) at 16:21

## 2024-04-30 RX ADMIN — INSULIN GLARGINE 15 UNITS: 100 INJECTION, SOLUTION SUBCUTANEOUS at 20:57

## 2024-04-30 RX ADMIN — IPRATROPIUM BROMIDE AND ALBUTEROL SULFATE 1 DOSE: .5; 3 SOLUTION RESPIRATORY (INHALATION) at 00:15

## 2024-04-30 RX ADMIN — SODIUM CHLORIDE, PRESERVATIVE FREE 10 ML: 5 INJECTION INTRAVENOUS at 09:46

## 2024-04-30 RX ADMIN — ATORVASTATIN CALCIUM 80 MG: 40 TABLET, FILM COATED ORAL at 20:57

## 2024-04-30 RX ADMIN — INSULIN LISPRO 2 UNITS: 100 INJECTION, SOLUTION INTRAVENOUS; SUBCUTANEOUS at 09:39

## 2024-04-30 RX ADMIN — CHOLECALCIFEROL TAB 25 MCG (1000 UNIT) 2000 UNITS: 25 TAB at 09:40

## 2024-04-30 RX ADMIN — ASPIRIN 81 MG CHEWABLE TABLET 81 MG: 81 TABLET CHEWABLE at 09:39

## 2024-04-30 RX ADMIN — AMIODARONE HYDROCHLORIDE 200 MG: 200 TABLET ORAL at 09:40

## 2024-04-30 RX ADMIN — WATER 20 MG: 1 INJECTION INTRAMUSCULAR; INTRAVENOUS; SUBCUTANEOUS at 20:58

## 2024-04-30 RX ADMIN — ACETAMINOPHEN 325MG 650 MG: 325 TABLET ORAL at 21:33

## 2024-04-30 RX ADMIN — TRAMADOL HYDROCHLORIDE 50 MG: 50 TABLET ORAL at 16:25

## 2024-04-30 RX ADMIN — METOPROLOL SUCCINATE 25 MG: 25 TABLET, EXTENDED RELEASE ORAL at 20:58

## 2024-04-30 RX ADMIN — METOPROLOL SUCCINATE 25 MG: 25 TABLET, EXTENDED RELEASE ORAL at 09:39

## 2024-04-30 RX ADMIN — IPRATROPIUM BROMIDE AND ALBUTEROL SULFATE 1 DOSE: .5; 3 SOLUTION RESPIRATORY (INHALATION) at 07:53

## 2024-04-30 RX ADMIN — EZETIMIBE 10 MG: 10 TABLET ORAL at 09:40

## 2024-04-30 RX ADMIN — IPRATROPIUM BROMIDE AND ALBUTEROL SULFATE 1 DOSE: .5; 3 SOLUTION RESPIRATORY (INHALATION) at 19:49

## 2024-04-30 RX ADMIN — HEPARIN SODIUM 5000 UNITS: 5000 INJECTION INTRAVENOUS; SUBCUTANEOUS at 16:11

## 2024-04-30 ASSESSMENT — PAIN SCALES - GENERAL
PAINLEVEL_OUTOF10: 0
PAINLEVEL_OUTOF10: 10
PAINLEVEL_OUTOF10: 10
PAINLEVEL_OUTOF10: 9

## 2024-04-30 ASSESSMENT — PAIN DESCRIPTION - FREQUENCY: FREQUENCY: INTERMITTENT

## 2024-04-30 ASSESSMENT — PAIN DESCRIPTION - ONSET: ONSET: GRADUAL

## 2024-04-30 ASSESSMENT — PAIN DESCRIPTION - DESCRIPTORS
DESCRIPTORS: ACHING;DISCOMFORT
DESCRIPTORS: ACHING

## 2024-04-30 ASSESSMENT — PAIN DESCRIPTION - LOCATION
LOCATION: CHEST
LOCATION: BACK

## 2024-04-30 ASSESSMENT — PAIN - FUNCTIONAL ASSESSMENT
PAIN_FUNCTIONAL_ASSESSMENT: PREVENTS OR INTERFERES WITH MANY ACTIVE NOT PASSIVE ACTIVITIES
PAIN_FUNCTIONAL_ASSESSMENT: PREVENTS OR INTERFERES SOME ACTIVE ACTIVITIES AND ADLS

## 2024-04-30 ASSESSMENT — PAIN DESCRIPTION - ORIENTATION
ORIENTATION: LOWER
ORIENTATION: RIGHT

## 2024-04-30 ASSESSMENT — PAIN DESCRIPTION - PAIN TYPE: TYPE: CHRONIC PAIN

## 2024-05-01 ENCOUNTER — APPOINTMENT (OUTPATIENT)
Facility: HOSPITAL | Age: 74
DRG: 208 | End: 2024-05-01
Payer: MEDICARE

## 2024-05-01 LAB
ALBUMIN SERPL-MCNC: 3 G/DL (ref 3.4–5)
ALBUMIN/GLOB SERPL: 1 (ref 0.8–1.7)
ALP SERPL-CCNC: 177 U/L (ref 45–117)
ALT SERPL-CCNC: 40 U/L (ref 13–56)
ANION GAP SERPL CALC-SCNC: 11 MMOL/L (ref 3–18)
APTT PPP: 23.9 SEC (ref 23–36.4)
AST SERPL-CCNC: 13 U/L (ref 10–38)
B PERT DNA SPEC QL NAA+PROBE: NOT DETECTED
BASOPHILS # BLD: 0 K/UL (ref 0–0.1)
BASOPHILS NFR BLD: 0 % (ref 0–2)
BILIRUB SERPL-MCNC: 0.7 MG/DL (ref 0.2–1)
BORDETELLA PARAPERTUSSIS BY PCR: NOT DETECTED
BUN SERPL-MCNC: 91 MG/DL (ref 7–18)
BUN/CREAT SERPL: 14 (ref 12–20)
C PNEUM DNA SPEC QL NAA+PROBE: NOT DETECTED
CALCIUM SERPL-MCNC: 9.4 MG/DL (ref 8.5–10.1)
CHLORIDE SERPL-SCNC: 90 MMOL/L (ref 100–111)
CO2 SERPL-SCNC: 25 MMOL/L (ref 21–32)
CORTIS SERPL-MCNC: 19 UG/DL
CORTIS SERPL-MCNC: 23.6 UG/DL
CREAT SERPL-MCNC: 6.45 MG/DL (ref 0.6–1.3)
DIFFERENTIAL METHOD BLD: ABNORMAL
EOSINOPHIL # BLD: 0 K/UL (ref 0–0.4)
EOSINOPHIL NFR BLD: 0 % (ref 0–5)
ERYTHROCYTE [DISTWIDTH] IN BLOOD BY AUTOMATED COUNT: 18.8 % (ref 11.6–14.5)
FLUAV SUBTYP SPEC NAA+PROBE: NOT DETECTED
FLUBV RNA SPEC QL NAA+PROBE: NOT DETECTED
GLOBULIN SER CALC-MCNC: 3 G/DL (ref 2–4)
GLUCOSE BLD STRIP.AUTO-MCNC: 244 MG/DL (ref 70–110)
GLUCOSE BLD STRIP.AUTO-MCNC: 254 MG/DL (ref 70–110)
GLUCOSE BLD STRIP.AUTO-MCNC: 308 MG/DL (ref 70–110)
GLUCOSE BLD STRIP.AUTO-MCNC: 320 MG/DL (ref 70–110)
GLUCOSE SERPL-MCNC: 328 MG/DL (ref 74–99)
HADV DNA SPEC QL NAA+PROBE: NOT DETECTED
HCOV 229E RNA SPEC QL NAA+PROBE: NOT DETECTED
HCOV HKU1 RNA SPEC QL NAA+PROBE: NOT DETECTED
HCOV NL63 RNA SPEC QL NAA+PROBE: NOT DETECTED
HCOV OC43 RNA SPEC QL NAA+PROBE: NOT DETECTED
HCT VFR BLD AUTO: 24 % (ref 35–45)
HGB BLD-MCNC: 7.5 G/DL (ref 12–16)
HMPV RNA SPEC QL NAA+PROBE: NOT DETECTED
HPIV1 RNA SPEC QL NAA+PROBE: NOT DETECTED
HPIV2 RNA SPEC QL NAA+PROBE: NOT DETECTED
HPIV3 RNA SPEC QL NAA+PROBE: NOT DETECTED
HPIV4 RNA SPEC QL NAA+PROBE: NOT DETECTED
IMM GRANULOCYTES # BLD AUTO: 0.2 K/UL (ref 0–0.04)
IMM GRANULOCYTES NFR BLD AUTO: 1 % (ref 0–0.5)
LYMPHOCYTES # BLD: 0.5 K/UL (ref 0.9–3.6)
LYMPHOCYTES NFR BLD: 3 % (ref 21–52)
M PNEUMO DNA SPEC QL NAA+PROBE: NOT DETECTED
MCH RBC QN AUTO: 29.4 PG (ref 24–34)
MCHC RBC AUTO-ENTMCNC: 31.3 G/DL (ref 31–37)
MCV RBC AUTO: 94.1 FL (ref 78–100)
MONOCYTES # BLD: 0.5 K/UL (ref 0.05–1.2)
MONOCYTES NFR BLD: 4 % (ref 3–10)
NEUTS SEG # BLD: 13.7 K/UL (ref 1.8–8)
NEUTS SEG NFR BLD: 92 % (ref 40–73)
NRBC # BLD: 0.29 K/UL (ref 0–0.01)
NRBC BLD-RTO: 1.9 PER 100 WBC
PLATELET # BLD AUTO: 253 K/UL (ref 135–420)
PMV BLD AUTO: 10.6 FL (ref 9.2–11.8)
POTASSIUM SERPL-SCNC: 5.4 MMOL/L (ref 3.5–5.5)
PROT SERPL-MCNC: 6 G/DL (ref 6.4–8.2)
RBC # BLD AUTO: 2.55 M/UL (ref 4.2–5.3)
RSV RNA SPEC QL NAA+PROBE: NOT DETECTED
RV+EV RNA SPEC QL NAA+PROBE: NOT DETECTED
SARS-COV-2 RNA RESP QL NAA+PROBE: NOT DETECTED
SODIUM SERPL-SCNC: 126 MMOL/L (ref 136–145)
WBC # BLD AUTO: 15 K/UL (ref 4.6–13.2)

## 2024-05-01 PROCEDURE — 87040 BLOOD CULTURE FOR BACTERIA: CPT

## 2024-05-01 PROCEDURE — 6370000000 HC RX 637 (ALT 250 FOR IP): Performed by: PHYSICIAN ASSISTANT

## 2024-05-01 PROCEDURE — 71045 X-RAY EXAM CHEST 1 VIEW: CPT

## 2024-05-01 PROCEDURE — 99232 SBSQ HOSP IP/OBS MODERATE 35: CPT | Performed by: INTERNAL MEDICINE

## 2024-05-01 PROCEDURE — 0202U NFCT DS 22 TRGT SARS-COV-2: CPT

## 2024-05-01 PROCEDURE — 2700000000 HC OXYGEN THERAPY PER DAY

## 2024-05-01 PROCEDURE — 36415 COLL VENOUS BLD VENIPUNCTURE: CPT

## 2024-05-01 PROCEDURE — 6360000002 HC RX W HCPCS: Performed by: INTERNAL MEDICINE

## 2024-05-01 PROCEDURE — 6370000000 HC RX 637 (ALT 250 FOR IP): Performed by: STUDENT IN AN ORGANIZED HEALTH CARE EDUCATION/TRAINING PROGRAM

## 2024-05-01 PROCEDURE — 82962 GLUCOSE BLOOD TEST: CPT

## 2024-05-01 PROCEDURE — 6370000000 HC RX 637 (ALT 250 FOR IP): Performed by: INTERNAL MEDICINE

## 2024-05-01 PROCEDURE — 80053 COMPREHEN METABOLIC PANEL: CPT

## 2024-05-01 PROCEDURE — 1100000000 HC RM PRIVATE

## 2024-05-01 PROCEDURE — 90935 HEMODIALYSIS ONE EVALUATION: CPT

## 2024-05-01 PROCEDURE — 2580000003 HC RX 258: Performed by: INTERNAL MEDICINE

## 2024-05-01 PROCEDURE — 6370000000 HC RX 637 (ALT 250 FOR IP): Performed by: EMERGENCY MEDICINE

## 2024-05-01 PROCEDURE — 94640 AIRWAY INHALATION TREATMENT: CPT

## 2024-05-01 PROCEDURE — 97112 NEUROMUSCULAR REEDUCATION: CPT

## 2024-05-01 PROCEDURE — 85730 THROMBOPLASTIN TIME PARTIAL: CPT

## 2024-05-01 PROCEDURE — 2580000003 HC RX 258: Performed by: STUDENT IN AN ORGANIZED HEALTH CARE EDUCATION/TRAINING PROGRAM

## 2024-05-01 PROCEDURE — 97535 SELF CARE MNGMENT TRAINING: CPT

## 2024-05-01 PROCEDURE — 97530 THERAPEUTIC ACTIVITIES: CPT

## 2024-05-01 PROCEDURE — 93005 ELECTROCARDIOGRAM TRACING: CPT | Performed by: INTERNAL MEDICINE

## 2024-05-01 PROCEDURE — 6370000000 HC RX 637 (ALT 250 FOR IP): Performed by: HOSPITALIST

## 2024-05-01 PROCEDURE — 94761 N-INVAS EAR/PLS OXIMETRY MLT: CPT

## 2024-05-01 PROCEDURE — 85025 COMPLETE CBC W/AUTO DIFF WBC: CPT

## 2024-05-01 RX ORDER — INSULIN LISPRO 100 [IU]/ML
0-4 INJECTION, SOLUTION INTRAVENOUS; SUBCUTANEOUS NIGHTLY
Status: DISCONTINUED | OUTPATIENT
Start: 2024-05-01 | End: 2024-05-01

## 2024-05-01 RX ORDER — INSULIN LISPRO 100 [IU]/ML
0-4 INJECTION, SOLUTION INTRAVENOUS; SUBCUTANEOUS NIGHTLY
Status: DISCONTINUED | OUTPATIENT
Start: 2024-05-01 | End: 2024-05-07 | Stop reason: HOSPADM

## 2024-05-01 RX ORDER — INSULIN GLARGINE 100 [IU]/ML
20 INJECTION, SOLUTION SUBCUTANEOUS NIGHTLY
Status: DISCONTINUED | OUTPATIENT
Start: 2024-05-01 | End: 2024-05-07 | Stop reason: HOSPADM

## 2024-05-01 RX ORDER — INSULIN LISPRO 100 [IU]/ML
0-16 INJECTION, SOLUTION INTRAVENOUS; SUBCUTANEOUS
Status: DISCONTINUED | OUTPATIENT
Start: 2024-05-01 | End: 2024-05-07 | Stop reason: HOSPADM

## 2024-05-01 RX ORDER — INSULIN LISPRO 100 [IU]/ML
0-8 INJECTION, SOLUTION INTRAVENOUS; SUBCUTANEOUS
Status: DISCONTINUED | OUTPATIENT
Start: 2024-05-01 | End: 2024-05-01

## 2024-05-01 RX ADMIN — BUDESONIDE 1 MG: 1 SUSPENSION RESPIRATORY (INHALATION) at 06:57

## 2024-05-01 RX ADMIN — HEPARIN SODIUM 5000 UNITS: 5000 INJECTION INTRAVENOUS; SUBCUTANEOUS at 20:52

## 2024-05-01 RX ADMIN — INSULIN LISPRO 6 UNITS: 100 INJECTION, SOLUTION INTRAVENOUS; SUBCUTANEOUS at 08:49

## 2024-05-01 RX ADMIN — SODIUM CHLORIDE, PRESERVATIVE FREE 10 ML: 5 INJECTION INTRAVENOUS at 20:54

## 2024-05-01 RX ADMIN — INSULIN GLARGINE 20 UNITS: 100 INJECTION, SOLUTION SUBCUTANEOUS at 20:53

## 2024-05-01 RX ADMIN — MIDODRINE HYDROCHLORIDE 5 MG: 5 TABLET ORAL at 13:01

## 2024-05-01 RX ADMIN — ARFORMOTEROL TARTRATE 15 MCG: 15 SOLUTION RESPIRATORY (INHALATION) at 06:57

## 2024-05-01 RX ADMIN — ATORVASTATIN CALCIUM 80 MG: 40 TABLET, FILM COATED ORAL at 20:52

## 2024-05-01 RX ADMIN — AMIODARONE HYDROCHLORIDE 200 MG: 200 TABLET ORAL at 08:52

## 2024-05-01 RX ADMIN — WATER 20 MG: 1 INJECTION INTRAMUSCULAR; INTRAVENOUS; SUBCUTANEOUS at 08:50

## 2024-05-01 RX ADMIN — CHOLECALCIFEROL TAB 25 MCG (1000 UNIT) 2000 UNITS: 25 TAB at 08:52

## 2024-05-01 RX ADMIN — IPRATROPIUM BROMIDE AND ALBUTEROL SULFATE 1 DOSE: .5; 3 SOLUTION RESPIRATORY (INHALATION) at 12:14

## 2024-05-01 RX ADMIN — Medication 1 MG: at 08:50

## 2024-05-01 RX ADMIN — EPOETIN ALFA-EPBX 6000 UNITS: 3000 INJECTION, SOLUTION INTRAVENOUS; SUBCUTANEOUS at 19:00

## 2024-05-01 RX ADMIN — METOPROLOL SUCCINATE 25 MG: 25 TABLET, EXTENDED RELEASE ORAL at 20:52

## 2024-05-01 RX ADMIN — TRAMADOL HYDROCHLORIDE 50 MG: 50 TABLET ORAL at 23:31

## 2024-05-01 RX ADMIN — ASPIRIN 81 MG CHEWABLE TABLET 81 MG: 81 TABLET CHEWABLE at 08:50

## 2024-05-01 RX ADMIN — ALLOPURINOL 100 MG: 100 TABLET ORAL at 20:52

## 2024-05-01 RX ADMIN — INSULIN LISPRO 6 UNITS: 100 INJECTION, SOLUTION INTRAVENOUS; SUBCUTANEOUS at 13:01

## 2024-05-01 RX ADMIN — FOLIC ACID 1 MG: 1 TABLET ORAL at 08:50

## 2024-05-01 RX ADMIN — SEVELAMER CARBONATE 800 MG: 800 TABLET, FILM COATED ORAL at 19:00

## 2024-05-01 RX ADMIN — SODIUM CHLORIDE, PRESERVATIVE FREE 10 ML: 5 INJECTION INTRAVENOUS at 08:52

## 2024-05-01 RX ADMIN — PANTOPRAZOLE SODIUM 40 MG: 40 TABLET, DELAYED RELEASE ORAL at 06:24

## 2024-05-01 RX ADMIN — IPRATROPIUM BROMIDE AND ALBUTEROL SULFATE 1 DOSE: .5; 3 SOLUTION RESPIRATORY (INHALATION) at 06:57

## 2024-05-01 RX ADMIN — INSULIN LISPRO 4 UNITS: 100 INJECTION, SOLUTION INTRAVENOUS; SUBCUTANEOUS at 19:00

## 2024-05-01 RX ADMIN — MIDODRINE HYDROCHLORIDE 5 MG: 5 TABLET ORAL at 21:01

## 2024-05-01 RX ADMIN — GABAPENTIN 300 MG: 300 CAPSULE ORAL at 19:00

## 2024-05-01 RX ADMIN — EZETIMIBE 10 MG: 10 TABLET ORAL at 08:50

## 2024-05-01 RX ADMIN — CYANOCOBALAMIN TAB 1000 MCG 1000 MCG: 1000 TAB at 08:50

## 2024-05-01 RX ADMIN — HEPARIN SODIUM 5000 UNITS: 5000 INJECTION INTRAVENOUS; SUBCUTANEOUS at 06:24

## 2024-05-01 RX ADMIN — WATER 20 MG: 1 INJECTION INTRAMUSCULAR; INTRAVENOUS; SUBCUTANEOUS at 20:52

## 2024-05-01 ASSESSMENT — PAIN SCALES - GENERAL
PAINLEVEL_OUTOF10: 0
PAINLEVEL_OUTOF10: 10

## 2024-05-01 ASSESSMENT — PAIN DESCRIPTION - PAIN TYPE: TYPE: CHRONIC PAIN

## 2024-05-01 ASSESSMENT — PAIN - FUNCTIONAL ASSESSMENT: PAIN_FUNCTIONAL_ASSESSMENT: PREVENTS OR INTERFERES SOME ACTIVE ACTIVITIES AND ADLS

## 2024-05-01 ASSESSMENT — PAIN DESCRIPTION - LOCATION: LOCATION: BACK

## 2024-05-01 ASSESSMENT — PAIN DESCRIPTION - DESCRIPTORS: DESCRIPTORS: ACHING

## 2024-05-01 ASSESSMENT — PAIN DESCRIPTION - ORIENTATION: ORIENTATION: LOWER

## 2024-05-01 ASSESSMENT — PAIN DESCRIPTION - ONSET: ONSET: GRADUAL

## 2024-05-01 ASSESSMENT — PULMONARY FUNCTION TESTS: PEFR_L/MIN: 92

## 2024-05-01 ASSESSMENT — PAIN DESCRIPTION - FREQUENCY: FREQUENCY: INTERMITTENT

## 2024-05-01 NOTE — DIALYSIS
HD Care plan  Time: 3 hrs  Dialysate:  2 K+   2.5 Ca++  Bath  Net UF: 3 L  Access: Aseptically care for MELL  AVF  Hemodynamic stability: Maintain BP WNL     Pre Dialysis:  Pt received from Unit Nurse Cher Lay , pt on a bed, A+O X 3, no s/s of acute distress noted. MELL AVF assessed, no abnormalities noted, bruit and thrill strong, AVF accessed using 15 G needles without any difficulty. Good flows achieved from both needles.Blood cultures 1 set drawn as per Dr Márquez's Order    Intra Dialysis:  Time out / safety process performed per policy, Tx initiated at 1515.    AVF flowing with ease. For hemodynamic stability UF goal set at 3000 ml as tolerated.  Pt offered assistance with repositioning every 2 hours/prn    Vascular access visible and line connections remained intact throughout entire duration of treatment.   Vital signs checked every 15 mins.     Post Dialysis:  Tx completed at 1815,   Tolerated well , 3 L  removed. De-accessed per protocol.    Clot time 6 minutes for arterial, and 6 minutes for venous.   Dry dressings applied.  Bruit/Thrill present above and below dressings.  Post Dialysis report given to unit Nurse Cher

## 2024-05-01 NOTE — CARE COORDINATION
05/01/24 1053   /Social Work Whiteboard Notes   /Social Work Whiteboard Yellow: 5/1-EULALIO followed up with [facility] to see if a bed will be available if the patient discharges on Friday. EULALIO is awaiting a response. -MARTY Villela, MSW     [phone]

## 2024-05-02 LAB
ALBUMIN SERPL-MCNC: 3 G/DL (ref 3.4–5)
ALBUMIN/GLOB SERPL: 1.1 (ref 0.8–1.7)
ALP SERPL-CCNC: 159 U/L (ref 45–117)
ALT SERPL-CCNC: 38 U/L (ref 13–56)
ANION GAP SERPL CALC-SCNC: 9 MMOL/L (ref 3–18)
AST SERPL-CCNC: 16 U/L (ref 10–38)
BASOPHILS # BLD: 0 K/UL (ref 0–0.1)
BASOPHILS NFR BLD: 0 % (ref 0–2)
BILIRUB SERPL-MCNC: 0.7 MG/DL (ref 0.2–1)
BUN SERPL-MCNC: 58 MG/DL (ref 7–18)
BUN/CREAT SERPL: 14 (ref 12–20)
CALCIUM SERPL-MCNC: 9.2 MG/DL (ref 8.5–10.1)
CHLORIDE SERPL-SCNC: 98 MMOL/L (ref 100–111)
CO2 SERPL-SCNC: 29 MMOL/L (ref 21–32)
CREAT SERPL-MCNC: 4.13 MG/DL (ref 0.6–1.3)
DIFFERENTIAL METHOD BLD: ABNORMAL
EKG ATRIAL RATE: 88 BPM
EKG DIAGNOSIS: NORMAL
EKG P AXIS: 69 DEGREES
EKG P-R INTERVAL: 162 MS
EKG Q-T INTERVAL: 378 MS
EKG QRS DURATION: 88 MS
EKG QTC CALCULATION (BAZETT): 457 MS
EKG R AXIS: 43 DEGREES
EKG T AXIS: 90 DEGREES
EKG VENTRICULAR RATE: 88 BPM
EOSINOPHIL # BLD: 0 K/UL (ref 0–0.4)
EOSINOPHIL NFR BLD: 0 % (ref 0–5)
ERYTHROCYTE [DISTWIDTH] IN BLOOD BY AUTOMATED COUNT: 19.7 % (ref 11.6–14.5)
GLOBULIN SER CALC-MCNC: 2.8 G/DL (ref 2–4)
GLUCOSE BLD STRIP.AUTO-MCNC: 143 MG/DL (ref 70–110)
GLUCOSE BLD STRIP.AUTO-MCNC: 160 MG/DL (ref 70–110)
GLUCOSE BLD STRIP.AUTO-MCNC: 163 MG/DL (ref 70–110)
GLUCOSE BLD STRIP.AUTO-MCNC: 190 MG/DL (ref 70–110)
GLUCOSE SERPL-MCNC: 255 MG/DL (ref 74–99)
HCT VFR BLD AUTO: 25.8 % (ref 35–45)
HGB BLD-MCNC: 8 G/DL (ref 12–16)
IMM GRANULOCYTES # BLD AUTO: 0.3 K/UL (ref 0–0.04)
IMM GRANULOCYTES NFR BLD AUTO: 2 % (ref 0–0.5)
LYMPHOCYTES # BLD: 0.8 K/UL (ref 0.9–3.6)
LYMPHOCYTES NFR BLD: 6 % (ref 21–52)
MCH RBC QN AUTO: 29.7 PG (ref 24–34)
MCHC RBC AUTO-ENTMCNC: 31 G/DL (ref 31–37)
MCV RBC AUTO: 95.9 FL (ref 78–100)
MONOCYTES # BLD: 1.1 K/UL (ref 0.05–1.2)
MONOCYTES NFR BLD: 8 % (ref 3–10)
NEUTS SEG # BLD: 12.1 K/UL (ref 1.8–8)
NEUTS SEG NFR BLD: 84 % (ref 40–73)
NRBC # BLD: 1.44 K/UL (ref 0–0.01)
NRBC BLD-RTO: 10 PER 100 WBC
PLATELET # BLD AUTO: 270 K/UL (ref 135–420)
PMV BLD AUTO: 10.3 FL (ref 9.2–11.8)
POTASSIUM SERPL-SCNC: 4.1 MMOL/L (ref 3.5–5.5)
PROCALCITONIN SERPL-MCNC: 1.54 NG/ML
PROT SERPL-MCNC: 5.8 G/DL (ref 6.4–8.2)
RBC # BLD AUTO: 2.69 M/UL (ref 4.2–5.3)
SODIUM SERPL-SCNC: 136 MMOL/L (ref 136–145)
TROPONIN I SERPL HS-MCNC: 100 NG/L (ref 0–54)
TROPONIN I SERPL HS-MCNC: 127 NG/L (ref 0–54)
TROPONIN I SERPL HS-MCNC: 95 NG/L (ref 0–54)
WBC # BLD AUTO: 14.3 K/UL (ref 4.6–13.2)

## 2024-05-02 PROCEDURE — 85025 COMPLETE CBC W/AUTO DIFF WBC: CPT

## 2024-05-02 PROCEDURE — 94761 N-INVAS EAR/PLS OXIMETRY MLT: CPT

## 2024-05-02 PROCEDURE — 99232 SBSQ HOSP IP/OBS MODERATE 35: CPT | Performed by: INTERNAL MEDICINE

## 2024-05-02 PROCEDURE — 2580000003 HC RX 258: Performed by: INTERNAL MEDICINE

## 2024-05-02 PROCEDURE — 82962 GLUCOSE BLOOD TEST: CPT

## 2024-05-02 PROCEDURE — 6370000000 HC RX 637 (ALT 250 FOR IP): Performed by: PHYSICIAN ASSISTANT

## 2024-05-02 PROCEDURE — 36415 COLL VENOUS BLD VENIPUNCTURE: CPT

## 2024-05-02 PROCEDURE — 80053 COMPREHEN METABOLIC PANEL: CPT

## 2024-05-02 PROCEDURE — 6370000000 HC RX 637 (ALT 250 FOR IP): Performed by: HOSPITALIST

## 2024-05-02 PROCEDURE — 94667 MNPJ CHEST WALL 1ST: CPT

## 2024-05-02 PROCEDURE — 6370000000 HC RX 637 (ALT 250 FOR IP): Performed by: INTERNAL MEDICINE

## 2024-05-02 PROCEDURE — 93010 ELECTROCARDIOGRAM REPORT: CPT | Performed by: INTERNAL MEDICINE

## 2024-05-02 PROCEDURE — 94640 AIRWAY INHALATION TREATMENT: CPT

## 2024-05-02 PROCEDURE — 6370000000 HC RX 637 (ALT 250 FOR IP): Performed by: EMERGENCY MEDICINE

## 2024-05-02 PROCEDURE — 6360000002 HC RX W HCPCS: Performed by: INTERNAL MEDICINE

## 2024-05-02 PROCEDURE — 94668 MNPJ CHEST WALL SBSQ: CPT

## 2024-05-02 PROCEDURE — 2580000003 HC RX 258: Performed by: STUDENT IN AN ORGANIZED HEALTH CARE EDUCATION/TRAINING PROGRAM

## 2024-05-02 PROCEDURE — 92526 ORAL FUNCTION THERAPY: CPT

## 2024-05-02 PROCEDURE — 1100000000 HC RM PRIVATE

## 2024-05-02 PROCEDURE — 84484 ASSAY OF TROPONIN QUANT: CPT

## 2024-05-02 PROCEDURE — 6370000000 HC RX 637 (ALT 250 FOR IP): Performed by: STUDENT IN AN ORGANIZED HEALTH CARE EDUCATION/TRAINING PROGRAM

## 2024-05-02 PROCEDURE — 84145 PROCALCITONIN (PCT): CPT

## 2024-05-02 RX ORDER — CARBOXYMETHYLCELLULOSE SODIUM 10 MG/ML
1 GEL OPHTHALMIC 4 TIMES DAILY
Status: DISCONTINUED | OUTPATIENT
Start: 2024-05-03 | End: 2024-05-05

## 2024-05-02 RX ADMIN — IPRATROPIUM BROMIDE AND ALBUTEROL SULFATE 1 DOSE: .5; 3 SOLUTION RESPIRATORY (INHALATION) at 23:45

## 2024-05-02 RX ADMIN — AMIODARONE HYDROCHLORIDE 200 MG: 200 TABLET ORAL at 09:32

## 2024-05-02 RX ADMIN — INSULIN GLARGINE 20 UNITS: 100 INJECTION, SOLUTION SUBCUTANEOUS at 21:30

## 2024-05-02 RX ADMIN — CHOLECALCIFEROL TAB 25 MCG (1000 UNIT) 2000 UNITS: 25 TAB at 10:46

## 2024-05-02 RX ADMIN — MIDODRINE HYDROCHLORIDE 5 MG: 5 TABLET ORAL at 15:57

## 2024-05-02 RX ADMIN — SODIUM CHLORIDE, PRESERVATIVE FREE 10 ML: 5 INJECTION INTRAVENOUS at 20:58

## 2024-05-02 RX ADMIN — BUDESONIDE 1 MG: 1 SUSPENSION RESPIRATORY (INHALATION) at 08:59

## 2024-05-02 RX ADMIN — POLYVINYL ALCOHOL, POVIDONE 1 DROP: 14; 6 SOLUTION/ DROPS OPHTHALMIC at 10:46

## 2024-05-02 RX ADMIN — Medication 1 MG: at 09:31

## 2024-05-02 RX ADMIN — POLYVINYL ALCOHOL, POVIDONE 1 DROP: 14; 6 SOLUTION/ DROPS OPHTHALMIC at 15:56

## 2024-05-02 RX ADMIN — SEVELAMER CARBONATE 800 MG: 800 TABLET, FILM COATED ORAL at 12:20

## 2024-05-02 RX ADMIN — SEVELAMER CARBONATE 800 MG: 800 TABLET, FILM COATED ORAL at 09:32

## 2024-05-02 RX ADMIN — ARFORMOTEROL TARTRATE 15 MCG: 15 SOLUTION RESPIRATORY (INHALATION) at 08:58

## 2024-05-02 RX ADMIN — IPRATROPIUM BROMIDE AND ALBUTEROL SULFATE 1 DOSE: .5; 3 SOLUTION RESPIRATORY (INHALATION) at 12:38

## 2024-05-02 RX ADMIN — ASPIRIN 81 MG CHEWABLE TABLET 81 MG: 81 TABLET CHEWABLE at 09:32

## 2024-05-02 RX ADMIN — METOPROLOL SUCCINATE 25 MG: 25 TABLET, EXTENDED RELEASE ORAL at 09:30

## 2024-05-02 RX ADMIN — WATER 20 MG: 1 INJECTION INTRAMUSCULAR; INTRAVENOUS; SUBCUTANEOUS at 09:32

## 2024-05-02 RX ADMIN — EZETIMIBE 10 MG: 10 TABLET ORAL at 09:30

## 2024-05-02 RX ADMIN — GABAPENTIN 300 MG: 300 CAPSULE ORAL at 18:26

## 2024-05-02 RX ADMIN — METOPROLOL SUCCINATE 25 MG: 25 TABLET, EXTENDED RELEASE ORAL at 20:51

## 2024-05-02 RX ADMIN — PANTOPRAZOLE SODIUM 40 MG: 40 TABLET, DELAYED RELEASE ORAL at 06:28

## 2024-05-02 RX ADMIN — FOLIC ACID 1 MG: 1 TABLET ORAL at 09:30

## 2024-05-02 RX ADMIN — IPRATROPIUM BROMIDE AND ALBUTEROL SULFATE 1 DOSE: .5; 3 SOLUTION RESPIRATORY (INHALATION) at 16:50

## 2024-05-02 RX ADMIN — ATORVASTATIN CALCIUM 80 MG: 40 TABLET, FILM COATED ORAL at 20:51

## 2024-05-02 RX ADMIN — SEVELAMER CARBONATE 800 MG: 800 TABLET, FILM COATED ORAL at 18:26

## 2024-05-02 RX ADMIN — IPRATROPIUM BROMIDE AND ALBUTEROL SULFATE 1 DOSE: .5; 3 SOLUTION RESPIRATORY (INHALATION) at 19:27

## 2024-05-02 RX ADMIN — CYANOCOBALAMIN TAB 1000 MCG 1000 MCG: 1000 TAB at 09:32

## 2024-05-02 RX ADMIN — POLYVINYL ALCOHOL, POVIDONE 1 DROP: 14; 6 SOLUTION/ DROPS OPHTHALMIC at 18:26

## 2024-05-02 RX ADMIN — POLYVINYL ALCOHOL, POVIDONE 1 DROP: 14; 6 SOLUTION/ DROPS OPHTHALMIC at 04:15

## 2024-05-02 RX ADMIN — HEPARIN SODIUM 5000 UNITS: 5000 INJECTION INTRAVENOUS; SUBCUTANEOUS at 06:27

## 2024-05-02 RX ADMIN — HEPARIN SODIUM 5000 UNITS: 5000 INJECTION INTRAVENOUS; SUBCUTANEOUS at 15:56

## 2024-05-02 RX ADMIN — ARFORMOTEROL TARTRATE 15 MCG: 15 SOLUTION RESPIRATORY (INHALATION) at 19:27

## 2024-05-02 RX ADMIN — POLYVINYL ALCOHOL, POVIDONE 1 DROP: 14; 6 SOLUTION/ DROPS OPHTHALMIC at 12:20

## 2024-05-02 RX ADMIN — IPRATROPIUM BROMIDE AND ALBUTEROL SULFATE 1 DOSE: .5; 3 SOLUTION RESPIRATORY (INHALATION) at 08:59

## 2024-05-02 RX ADMIN — HEPARIN SODIUM 5000 UNITS: 5000 INJECTION INTRAVENOUS; SUBCUTANEOUS at 21:48

## 2024-05-02 RX ADMIN — SODIUM CHLORIDE, PRESERVATIVE FREE 10 ML: 5 INJECTION INTRAVENOUS at 09:37

## 2024-05-02 RX ADMIN — WATER 20 MG: 1 INJECTION INTRAMUSCULAR; INTRAVENOUS; SUBCUTANEOUS at 20:52

## 2024-05-02 RX ADMIN — BUDESONIDE 1 MG: 1 SUSPENSION RESPIRATORY (INHALATION) at 19:27

## 2024-05-02 RX ADMIN — MIDODRINE HYDROCHLORIDE 5 MG: 5 TABLET ORAL at 09:30

## 2024-05-02 ASSESSMENT — PAIN SCALES - GENERAL
PAINLEVEL_OUTOF10: 0

## 2024-05-02 NOTE — SIGNIFICANT EVENT
INTERIM UPDATE - 2333 EST on 5/01/2024    Received [employer] for Nursing Staff at 2312 EST on 5/01/2024 reporting 5-Beat Run of Ventricular Tachycardia.  Last K+ was 5.4 mmol/L (5/01/2024) and last Serum Magnesium was 2.1 mg/dL (4/30/2024).    Plan:  STAT EKG to assess QTc Interval and Troponin now and in 6 hours.

## 2024-05-02 NOTE — CARE COORDINATION
MSW spoke with [facility] liaison and facility wont have a HD chair until 5/5 Sunday however facility doesn't accept admissions on Sunday     Attending notified   Patient notified     Betyt Lea  Case Management

## 2024-05-03 LAB
ALBUMIN SERPL-MCNC: 2.8 G/DL (ref 3.4–5)
ALBUMIN/GLOB SERPL: 1 (ref 0.8–1.7)
ALP SERPL-CCNC: 144 U/L (ref 45–117)
ALT SERPL-CCNC: 36 U/L (ref 13–56)
ANION GAP SERPL CALC-SCNC: 10 MMOL/L (ref 3–18)
AST SERPL-CCNC: 13 U/L (ref 10–38)
BASOPHILS # BLD: 0 K/UL (ref 0–0.1)
BASOPHILS NFR BLD: 0 % (ref 0–2)
BILIRUB SERPL-MCNC: 1.2 MG/DL (ref 0.2–1)
BUN SERPL-MCNC: 90 MG/DL (ref 7–18)
BUN/CREAT SERPL: 16 (ref 12–20)
CALCIUM SERPL-MCNC: 8.9 MG/DL (ref 8.5–10.1)
CHLORIDE SERPL-SCNC: 97 MMOL/L (ref 100–111)
CO2 SERPL-SCNC: 26 MMOL/L (ref 21–32)
CREAT SERPL-MCNC: 5.75 MG/DL (ref 0.6–1.3)
DIFFERENTIAL METHOD BLD: ABNORMAL
EOSINOPHIL # BLD: 0 K/UL (ref 0–0.4)
EOSINOPHIL NFR BLD: 0 % (ref 0–5)
ERYTHROCYTE [DISTWIDTH] IN BLOOD BY AUTOMATED COUNT: 19.8 % (ref 11.6–14.5)
GLOBULIN SER CALC-MCNC: 2.7 G/DL (ref 2–4)
GLUCOSE BLD STRIP.AUTO-MCNC: 189 MG/DL (ref 70–110)
GLUCOSE BLD STRIP.AUTO-MCNC: 197 MG/DL (ref 70–110)
GLUCOSE BLD STRIP.AUTO-MCNC: 287 MG/DL (ref 70–110)
GLUCOSE BLD STRIP.AUTO-MCNC: 304 MG/DL (ref 70–110)
GLUCOSE SERPL-MCNC: 215 MG/DL (ref 74–99)
HCT VFR BLD AUTO: 24.3 % (ref 35–45)
HGB BLD-MCNC: 7.6 G/DL (ref 12–16)
IMM GRANULOCYTES # BLD AUTO: 0.2 K/UL (ref 0–0.04)
IMM GRANULOCYTES NFR BLD AUTO: 2 % (ref 0–0.5)
LYMPHOCYTES # BLD: 0.4 K/UL (ref 0.9–3.6)
LYMPHOCYTES NFR BLD: 4 % (ref 21–52)
MCH RBC QN AUTO: 30.3 PG (ref 24–34)
MCHC RBC AUTO-ENTMCNC: 31.3 G/DL (ref 31–37)
MCV RBC AUTO: 96.8 FL (ref 78–100)
MONOCYTES # BLD: 0.5 K/UL (ref 0.05–1.2)
MONOCYTES NFR BLD: 4 % (ref 3–10)
NEUTS SEG # BLD: 11.1 K/UL (ref 1.8–8)
NEUTS SEG NFR BLD: 91 % (ref 40–73)
NRBC # BLD: 0.65 K/UL (ref 0–0.01)
NRBC BLD-RTO: 5.3 PER 100 WBC
PLATELET # BLD AUTO: 263 K/UL (ref 135–420)
PMV BLD AUTO: 10.2 FL (ref 9.2–11.8)
POTASSIUM SERPL-SCNC: 5.3 MMOL/L (ref 3.5–5.5)
PROT SERPL-MCNC: 5.5 G/DL (ref 6.4–8.2)
RBC # BLD AUTO: 2.51 M/UL (ref 4.2–5.3)
SODIUM SERPL-SCNC: 133 MMOL/L (ref 136–145)
WBC # BLD AUTO: 12.3 K/UL (ref 4.6–13.2)

## 2024-05-03 PROCEDURE — 2580000003 HC RX 258: Performed by: INTERNAL MEDICINE

## 2024-05-03 PROCEDURE — 99232 SBSQ HOSP IP/OBS MODERATE 35: CPT | Performed by: INTERNAL MEDICINE

## 2024-05-03 PROCEDURE — 85025 COMPLETE CBC W/AUTO DIFF WBC: CPT

## 2024-05-03 PROCEDURE — 6370000000 HC RX 637 (ALT 250 FOR IP): Performed by: PHYSICIAN ASSISTANT

## 2024-05-03 PROCEDURE — 1100000000 HC RM PRIVATE

## 2024-05-03 PROCEDURE — 94761 N-INVAS EAR/PLS OXIMETRY MLT: CPT

## 2024-05-03 PROCEDURE — 6370000000 HC RX 637 (ALT 250 FOR IP): Performed by: EMERGENCY MEDICINE

## 2024-05-03 PROCEDURE — 6360000002 HC RX W HCPCS: Performed by: INTERNAL MEDICINE

## 2024-05-03 PROCEDURE — 6370000000 HC RX 637 (ALT 250 FOR IP): Performed by: HOSPITALIST

## 2024-05-03 PROCEDURE — 97530 THERAPEUTIC ACTIVITIES: CPT

## 2024-05-03 PROCEDURE — 6370000000 HC RX 637 (ALT 250 FOR IP): Performed by: STUDENT IN AN ORGANIZED HEALTH CARE EDUCATION/TRAINING PROGRAM

## 2024-05-03 PROCEDURE — 94668 MNPJ CHEST WALL SBSQ: CPT

## 2024-05-03 PROCEDURE — 97110 THERAPEUTIC EXERCISES: CPT

## 2024-05-03 PROCEDURE — 36415 COLL VENOUS BLD VENIPUNCTURE: CPT

## 2024-05-03 PROCEDURE — 82962 GLUCOSE BLOOD TEST: CPT

## 2024-05-03 PROCEDURE — 2580000003 HC RX 258: Performed by: STUDENT IN AN ORGANIZED HEALTH CARE EDUCATION/TRAINING PROGRAM

## 2024-05-03 PROCEDURE — 94640 AIRWAY INHALATION TREATMENT: CPT

## 2024-05-03 PROCEDURE — 6370000000 HC RX 637 (ALT 250 FOR IP): Performed by: INTERNAL MEDICINE

## 2024-05-03 PROCEDURE — 80053 COMPREHEN METABOLIC PANEL: CPT

## 2024-05-03 RX ORDER — PREDNISONE 20 MG/1
20 TABLET ORAL DAILY
Status: DISCONTINUED | OUTPATIENT
Start: 2024-05-03 | End: 2024-05-05

## 2024-05-03 RX ADMIN — HEPARIN SODIUM 5000 UNITS: 5000 INJECTION INTRAVENOUS; SUBCUTANEOUS at 21:50

## 2024-05-03 RX ADMIN — INSULIN GLARGINE 20 UNITS: 100 INJECTION, SOLUTION SUBCUTANEOUS at 21:50

## 2024-05-03 RX ADMIN — EZETIMIBE 10 MG: 10 TABLET ORAL at 08:17

## 2024-05-03 RX ADMIN — WATER 20 MG: 1 INJECTION INTRAMUSCULAR; INTRAVENOUS; SUBCUTANEOUS at 08:17

## 2024-05-03 RX ADMIN — ARFORMOTEROL TARTRATE 15 MCG: 15 SOLUTION RESPIRATORY (INHALATION) at 07:49

## 2024-05-03 RX ADMIN — SODIUM CHLORIDE, PRESERVATIVE FREE 10 ML: 5 INJECTION INTRAVENOUS at 21:50

## 2024-05-03 RX ADMIN — ATORVASTATIN CALCIUM 80 MG: 40 TABLET, FILM COATED ORAL at 21:49

## 2024-05-03 RX ADMIN — FOLIC ACID 1 MG: 1 TABLET ORAL at 08:16

## 2024-05-03 RX ADMIN — Medication 1 MG: at 08:17

## 2024-05-03 RX ADMIN — ASPIRIN 81 MG CHEWABLE TABLET 81 MG: 81 TABLET CHEWABLE at 08:17

## 2024-05-03 RX ADMIN — ALLOPURINOL 100 MG: 100 TABLET ORAL at 21:49

## 2024-05-03 RX ADMIN — CHOLECALCIFEROL TAB 25 MCG (1000 UNIT) 2000 UNITS: 25 TAB at 08:17

## 2024-05-03 RX ADMIN — CYANOCOBALAMIN TAB 1000 MCG 1000 MCG: 1000 TAB at 08:17

## 2024-05-03 RX ADMIN — BUDESONIDE 1 MG: 1 SUSPENSION RESPIRATORY (INHALATION) at 07:49

## 2024-05-03 RX ADMIN — ARFORMOTEROL TARTRATE 15 MCG: 15 SOLUTION RESPIRATORY (INHALATION) at 19:27

## 2024-05-03 RX ADMIN — PREDNISONE 20 MG: 20 TABLET ORAL at 12:14

## 2024-05-03 RX ADMIN — METOPROLOL SUCCINATE 25 MG: 25 TABLET, EXTENDED RELEASE ORAL at 21:49

## 2024-05-03 RX ADMIN — METOPROLOL SUCCINATE 25 MG: 25 TABLET, EXTENDED RELEASE ORAL at 08:17

## 2024-05-03 RX ADMIN — SODIUM CHLORIDE, PRESERVATIVE FREE 10 ML: 5 INJECTION INTRAVENOUS at 08:24

## 2024-05-03 RX ADMIN — CARBOXYMETHYLCELLULOSE SODIUM 1 DROP: 10 GEL OPHTHALMIC at 18:47

## 2024-05-03 RX ADMIN — SEVELAMER CARBONATE 800 MG: 800 TABLET, FILM COATED ORAL at 08:16

## 2024-05-03 RX ADMIN — IPRATROPIUM BROMIDE AND ALBUTEROL SULFATE 1 DOSE: .5; 3 SOLUTION RESPIRATORY (INHALATION) at 07:49

## 2024-05-03 RX ADMIN — IPRATROPIUM BROMIDE AND ALBUTEROL SULFATE 1 DOSE: .5; 3 SOLUTION RESPIRATORY (INHALATION) at 19:27

## 2024-05-03 RX ADMIN — TRAMADOL HYDROCHLORIDE 50 MG: 50 TABLET ORAL at 12:21

## 2024-05-03 RX ADMIN — INSULIN LISPRO 4 UNITS: 100 INJECTION, SOLUTION INTRAVENOUS; SUBCUTANEOUS at 21:51

## 2024-05-03 RX ADMIN — SEVELAMER CARBONATE 800 MG: 800 TABLET, FILM COATED ORAL at 18:43

## 2024-05-03 RX ADMIN — CARBOXYMETHYLCELLULOSE SODIUM 1 DROP: 10 GEL OPHTHALMIC at 21:50

## 2024-05-03 RX ADMIN — IPRATROPIUM BROMIDE AND ALBUTEROL SULFATE 1 DOSE: .5; 3 SOLUTION RESPIRATORY (INHALATION) at 03:11

## 2024-05-03 RX ADMIN — CARBOXYMETHYLCELLULOSE SODIUM 1 DROP: 10 GEL OPHTHALMIC at 12:12

## 2024-05-03 RX ADMIN — MIDODRINE HYDROCHLORIDE 5 MG: 5 TABLET ORAL at 21:49

## 2024-05-03 RX ADMIN — AMIODARONE HYDROCHLORIDE 200 MG: 200 TABLET ORAL at 08:16

## 2024-05-03 RX ADMIN — POLYVINYL ALCOHOL, POVIDONE 1 DROP: 14; 6 SOLUTION/ DROPS OPHTHALMIC at 18:47

## 2024-05-03 RX ADMIN — PANTOPRAZOLE SODIUM 40 MG: 40 TABLET, DELAYED RELEASE ORAL at 06:00

## 2024-05-03 RX ADMIN — EPOETIN ALFA-EPBX 6000 UNITS: 3000 INJECTION, SOLUTION INTRAVENOUS; SUBCUTANEOUS at 18:43

## 2024-05-03 RX ADMIN — INSULIN LISPRO 8 UNITS: 100 INJECTION, SOLUTION INTRAVENOUS; SUBCUTANEOUS at 12:13

## 2024-05-03 RX ADMIN — GABAPENTIN 300 MG: 300 CAPSULE ORAL at 18:43

## 2024-05-03 RX ADMIN — IPRATROPIUM BROMIDE AND ALBUTEROL SULFATE 1 DOSE: .5; 3 SOLUTION RESPIRATORY (INHALATION) at 12:06

## 2024-05-03 RX ADMIN — SEVELAMER CARBONATE 800 MG: 800 TABLET, FILM COATED ORAL at 12:14

## 2024-05-03 RX ADMIN — BUDESONIDE 1 MG: 1 SUSPENSION RESPIRATORY (INHALATION) at 19:27

## 2024-05-03 RX ADMIN — POLYVINYL ALCOHOL, POVIDONE 1 DROP: 14; 6 SOLUTION/ DROPS OPHTHALMIC at 08:16

## 2024-05-03 RX ADMIN — HEPARIN SODIUM 5000 UNITS: 5000 INJECTION INTRAVENOUS; SUBCUTANEOUS at 05:33

## 2024-05-03 ASSESSMENT — PAIN DESCRIPTION - DESCRIPTORS: DESCRIPTORS: STABBING

## 2024-05-03 ASSESSMENT — PAIN SCALES - GENERAL
PAINLEVEL_OUTOF10: 0
PAINLEVEL_OUTOF10: 0
PAINLEVEL_OUTOF10: 10
PAINLEVEL_OUTOF10: 0

## 2024-05-03 ASSESSMENT — PAIN DESCRIPTION - LOCATION: LOCATION: BACK

## 2024-05-03 ASSESSMENT — PAIN DESCRIPTION - ORIENTATION: ORIENTATION: LOWER

## 2024-05-03 NOTE — DISCHARGE INSTRUCTIONS
Learning About Managing Pain in the Hospital  What is pain in the hospital like?     You may feel some pain during your stay in the hospital. A certain amount of pain from an injury or surgery is common and normal.  Pain is your body's way of letting you know that something is wrong. Your body's nerve endings send pain signals to your brain. Everyone feels pain differently. It can be dull or sharp. It can throb or burn. It can move from one part of your body to another.  No matter how much or why or for how long it hurts, pain can make it hard to move, sleep, and recover. Your care team will work with you to help manage your pain.  How will it be managed?  Your doctor will explain how much pain you should expect and how long it may last. This will depend on what is causing your pain.  Controlling pain doesn't always mean that you will feel no pain at all. You and your doctor can work together to find ways to limit your pain as you recover so you can feel better and do more for yourself.  Your feelings about how you want your pain to be managed are important. Some things that might shape what you prefer include:  How you've dealt with pain in the past.  Your feelings about side effects of medicine.  Your past health.  Your age.  Your doctor will talk with you about what options are available in your case. Your choices for pain control might include more than one medicine. In the hospital, you might get intravenous (I.V.) medicines through a tube in a vein in your arm or hand. You may also use a type of pump that lets you give yourself a certain amount of pain medicine if you need it. And some pain control medicines might be in pill form.  Some conditions can make pain control more complex. If this is true in your case, you might be referred to a pain specialist.  How can you help?  Your doctor can't tell by looking at you how you're feeling. But when you can describe your pain and how bad it is, your doctor can

## 2024-05-03 NOTE — WOUND CARE
Room 470: Focused wound assess    Right medial heel, pink, blanchable, hyperpigmented, callus, floated off pillow.  Left heel intact.  Left Sacrum has closed scarring with flaky skin, & hyperpigmentation.   Inner gluteal cleft has closed scarring, hyperpigmentation, silicone heart dressing in place.   Pt on nancie bed. Skin care protocol per unit staff.   Unit nursing staff to turn & reposition q2hrs, left & right sides, on back only for meals or treatments. Use turning wedges.  Float heels off bed daily with one pillow under each leg lengthwise.  Will turn over care to unit nursing staff at this time & sign off.   Soheila MARIE, RN, CWOCN, CFCN

## 2024-05-03 NOTE — CARE COORDINATION
Spoke to Maira regarding pt transporting to [facility] on Monday. She states bed is available, but they will need to have dialysis auth and updated dialysis flowsheets.   Will try and schedule transport on Monday for after her dialysis session here.    Yeny Patel RN, BSN, [facility]  Case Management  [phone]

## 2024-05-04 LAB
ALBUMIN SERPL-MCNC: 3 G/DL (ref 3.4–5)
ALBUMIN/GLOB SERPL: 1.1 (ref 0.8–1.7)
ALP SERPL-CCNC: 142 U/L (ref 45–117)
ALT SERPL-CCNC: 39 U/L (ref 13–56)
ANION GAP SERPL CALC-SCNC: 10 MMOL/L (ref 3–18)
AST SERPL-CCNC: 15 U/L (ref 10–38)
BASOPHILS # BLD: 0 K/UL (ref 0–0.1)
BASOPHILS NFR BLD: 0 % (ref 0–2)
BILIRUB SERPL-MCNC: 0.9 MG/DL (ref 0.2–1)
BUN SERPL-MCNC: 57 MG/DL (ref 7–18)
BUN/CREAT SERPL: 14 (ref 12–20)
CALCIUM SERPL-MCNC: 9.1 MG/DL (ref 8.5–10.1)
CHLORIDE SERPL-SCNC: 97 MMOL/L (ref 100–111)
CO2 SERPL-SCNC: 27 MMOL/L (ref 21–32)
CREAT SERPL-MCNC: 4.16 MG/DL (ref 0.6–1.3)
DIFFERENTIAL METHOD BLD: ABNORMAL
EOSINOPHIL # BLD: 0 K/UL (ref 0–0.4)
EOSINOPHIL NFR BLD: 0 % (ref 0–5)
ERYTHROCYTE [DISTWIDTH] IN BLOOD BY AUTOMATED COUNT: 20.3 % (ref 11.6–14.5)
GLOBULIN SER CALC-MCNC: 2.8 G/DL (ref 2–4)
GLUCOSE BLD STRIP.AUTO-MCNC: 159 MG/DL (ref 70–110)
GLUCOSE BLD STRIP.AUTO-MCNC: 182 MG/DL (ref 70–110)
GLUCOSE BLD STRIP.AUTO-MCNC: 256 MG/DL (ref 70–110)
GLUCOSE BLD STRIP.AUTO-MCNC: 288 MG/DL (ref 70–110)
GLUCOSE SERPL-MCNC: 245 MG/DL (ref 74–99)
HCT VFR BLD AUTO: 25.5 % (ref 35–45)
HGB BLD-MCNC: 8.1 G/DL (ref 12–16)
IMM GRANULOCYTES # BLD AUTO: 0.3 K/UL (ref 0–0.04)
IMM GRANULOCYTES NFR BLD AUTO: 2 % (ref 0–0.5)
LYMPHOCYTES # BLD: 0.7 K/UL (ref 0.9–3.6)
LYMPHOCYTES NFR BLD: 6 % (ref 21–52)
MCH RBC QN AUTO: 30.6 PG (ref 24–34)
MCHC RBC AUTO-ENTMCNC: 31.8 G/DL (ref 31–37)
MCV RBC AUTO: 96.2 FL (ref 78–100)
MONOCYTES # BLD: 1.2 K/UL (ref 0.05–1.2)
MONOCYTES NFR BLD: 9 % (ref 3–10)
NEUTS SEG # BLD: 11.1 K/UL (ref 1.8–8)
NEUTS SEG NFR BLD: 83 % (ref 40–73)
NRBC # BLD: 1.71 K/UL (ref 0–0.01)
NRBC BLD-RTO: 12.8 PER 100 WBC
PHOSPHATE SERPL-MCNC: 2.8 MG/DL (ref 2.5–4.9)
PLATELET # BLD AUTO: 288 K/UL (ref 135–420)
PMV BLD AUTO: 10.3 FL (ref 9.2–11.8)
POTASSIUM SERPL-SCNC: 3.9 MMOL/L (ref 3.5–5.5)
PROT SERPL-MCNC: 5.8 G/DL (ref 6.4–8.2)
RBC # BLD AUTO: 2.65 M/UL (ref 4.2–5.3)
SODIUM SERPL-SCNC: 134 MMOL/L (ref 136–145)
WBC # BLD AUTO: 13.4 K/UL (ref 4.6–13.2)

## 2024-05-04 PROCEDURE — 6370000000 HC RX 637 (ALT 250 FOR IP): Performed by: STUDENT IN AN ORGANIZED HEALTH CARE EDUCATION/TRAINING PROGRAM

## 2024-05-04 PROCEDURE — 94668 MNPJ CHEST WALL SBSQ: CPT

## 2024-05-04 PROCEDURE — 82962 GLUCOSE BLOOD TEST: CPT

## 2024-05-04 PROCEDURE — 85025 COMPLETE CBC W/AUTO DIFF WBC: CPT

## 2024-05-04 PROCEDURE — 6370000000 HC RX 637 (ALT 250 FOR IP): Performed by: EMERGENCY MEDICINE

## 2024-05-04 PROCEDURE — 6370000000 HC RX 637 (ALT 250 FOR IP): Performed by: INTERNAL MEDICINE

## 2024-05-04 PROCEDURE — 6360000002 HC RX W HCPCS: Performed by: INTERNAL MEDICINE

## 2024-05-04 PROCEDURE — 6370000000 HC RX 637 (ALT 250 FOR IP): Performed by: HOSPITALIST

## 2024-05-04 PROCEDURE — 97535 SELF CARE MNGMENT TRAINING: CPT

## 2024-05-04 PROCEDURE — 94640 AIRWAY INHALATION TREATMENT: CPT

## 2024-05-04 PROCEDURE — 6370000000 HC RX 637 (ALT 250 FOR IP): Performed by: PHYSICIAN ASSISTANT

## 2024-05-04 PROCEDURE — 36415 COLL VENOUS BLD VENIPUNCTURE: CPT

## 2024-05-04 PROCEDURE — 80053 COMPREHEN METABOLIC PANEL: CPT

## 2024-05-04 PROCEDURE — 99232 SBSQ HOSP IP/OBS MODERATE 35: CPT | Performed by: INTERNAL MEDICINE

## 2024-05-04 PROCEDURE — 2580000003 HC RX 258: Performed by: STUDENT IN AN ORGANIZED HEALTH CARE EDUCATION/TRAINING PROGRAM

## 2024-05-04 PROCEDURE — 84100 ASSAY OF PHOSPHORUS: CPT

## 2024-05-04 PROCEDURE — 1100000000 HC RM PRIVATE

## 2024-05-04 RX ADMIN — ATORVASTATIN CALCIUM 80 MG: 40 TABLET, FILM COATED ORAL at 21:15

## 2024-05-04 RX ADMIN — MIDODRINE HYDROCHLORIDE 5 MG: 5 TABLET ORAL at 14:42

## 2024-05-04 RX ADMIN — EZETIMIBE 10 MG: 10 TABLET ORAL at 10:33

## 2024-05-04 RX ADMIN — INSULIN LISPRO 8 UNITS: 100 INJECTION, SOLUTION INTRAVENOUS; SUBCUTANEOUS at 16:59

## 2024-05-04 RX ADMIN — METOPROLOL SUCCINATE 25 MG: 25 TABLET, EXTENDED RELEASE ORAL at 21:15

## 2024-05-04 RX ADMIN — HEPARIN SODIUM 5000 UNITS: 5000 INJECTION INTRAVENOUS; SUBCUTANEOUS at 21:14

## 2024-05-04 RX ADMIN — ASPIRIN 81 MG CHEWABLE TABLET 81 MG: 81 TABLET CHEWABLE at 10:33

## 2024-05-04 RX ADMIN — METOPROLOL SUCCINATE 25 MG: 25 TABLET, EXTENDED RELEASE ORAL at 10:33

## 2024-05-04 RX ADMIN — SODIUM CHLORIDE, PRESERVATIVE FREE 10 ML: 5 INJECTION INTRAVENOUS at 10:35

## 2024-05-04 RX ADMIN — SODIUM CHLORIDE, PRESERVATIVE FREE 10 ML: 5 INJECTION INTRAVENOUS at 21:16

## 2024-05-04 RX ADMIN — POLYVINYL ALCOHOL, POVIDONE 1 DROP: 14; 6 SOLUTION/ DROPS OPHTHALMIC at 10:34

## 2024-05-04 RX ADMIN — ARFORMOTEROL TARTRATE 15 MCG: 15 SOLUTION RESPIRATORY (INHALATION) at 19:52

## 2024-05-04 RX ADMIN — IPRATROPIUM BROMIDE AND ALBUTEROL SULFATE 1 DOSE: .5; 3 SOLUTION RESPIRATORY (INHALATION) at 22:59

## 2024-05-04 RX ADMIN — SEVELAMER CARBONATE 800 MG: 800 TABLET, FILM COATED ORAL at 12:28

## 2024-05-04 RX ADMIN — POLYVINYL ALCOHOL, POVIDONE 1 DROP: 14; 6 SOLUTION/ DROPS OPHTHALMIC at 04:29

## 2024-05-04 RX ADMIN — CARBOXYMETHYLCELLULOSE SODIUM 1 DROP: 10 GEL OPHTHALMIC at 10:34

## 2024-05-04 RX ADMIN — SEVELAMER CARBONATE 800 MG: 800 TABLET, FILM COATED ORAL at 17:00

## 2024-05-04 RX ADMIN — TRAMADOL HYDROCHLORIDE 50 MG: 50 TABLET ORAL at 21:15

## 2024-05-04 RX ADMIN — CYANOCOBALAMIN TAB 1000 MCG 1000 MCG: 1000 TAB at 10:33

## 2024-05-04 RX ADMIN — POLYVINYL ALCOHOL, POVIDONE 1 DROP: 14; 6 SOLUTION/ DROPS OPHTHALMIC at 08:24

## 2024-05-04 RX ADMIN — IPRATROPIUM BROMIDE AND ALBUTEROL SULFATE 1 DOSE: .5; 3 SOLUTION RESPIRATORY (INHALATION) at 19:52

## 2024-05-04 RX ADMIN — TRAMADOL HYDROCHLORIDE 50 MG: 50 TABLET ORAL at 04:29

## 2024-05-04 RX ADMIN — HEPARIN SODIUM 5000 UNITS: 5000 INJECTION INTRAVENOUS; SUBCUTANEOUS at 14:28

## 2024-05-04 RX ADMIN — POLYVINYL ALCOHOL, POVIDONE 1 DROP: 14; 6 SOLUTION/ DROPS OPHTHALMIC at 19:15

## 2024-05-04 RX ADMIN — IPRATROPIUM BROMIDE AND ALBUTEROL SULFATE 1 DOSE: .5; 3 SOLUTION RESPIRATORY (INHALATION) at 16:49

## 2024-05-04 RX ADMIN — MIDODRINE HYDROCHLORIDE 5 MG: 5 TABLET ORAL at 10:33

## 2024-05-04 RX ADMIN — MIDODRINE HYDROCHLORIDE 5 MG: 5 TABLET ORAL at 21:15

## 2024-05-04 RX ADMIN — CARBOXYMETHYLCELLULOSE SODIUM 1 DROP: 10 GEL OPHTHALMIC at 21:15

## 2024-05-04 RX ADMIN — Medication 1 MG: at 10:33

## 2024-05-04 RX ADMIN — POLYVINYL ALCOHOL, POVIDONE 1 DROP: 14; 6 SOLUTION/ DROPS OPHTHALMIC at 14:46

## 2024-05-04 RX ADMIN — PANTOPRAZOLE SODIUM 40 MG: 40 TABLET, DELAYED RELEASE ORAL at 06:01

## 2024-05-04 RX ADMIN — CHOLECALCIFEROL TAB 25 MCG (1000 UNIT) 2000 UNITS: 25 TAB at 10:34

## 2024-05-04 RX ADMIN — HEPARIN SODIUM 5000 UNITS: 5000 INJECTION INTRAVENOUS; SUBCUTANEOUS at 06:01

## 2024-05-04 RX ADMIN — CARBOXYMETHYLCELLULOSE SODIUM 1 DROP: 10 GEL OPHTHALMIC at 12:28

## 2024-05-04 RX ADMIN — BUDESONIDE 1 MG: 1 SUSPENSION RESPIRATORY (INHALATION) at 19:52

## 2024-05-04 RX ADMIN — PREDNISONE 20 MG: 20 TABLET ORAL at 10:33

## 2024-05-04 RX ADMIN — FOLIC ACID 1 MG: 1 TABLET ORAL at 10:33

## 2024-05-04 RX ADMIN — GABAPENTIN 300 MG: 300 CAPSULE ORAL at 19:15

## 2024-05-04 RX ADMIN — AMIODARONE HYDROCHLORIDE 200 MG: 200 TABLET ORAL at 10:34

## 2024-05-04 RX ADMIN — INSULIN GLARGINE 20 UNITS: 100 INJECTION, SOLUTION SUBCUTANEOUS at 21:14

## 2024-05-04 RX ADMIN — SEVELAMER CARBONATE 800 MG: 800 TABLET, FILM COATED ORAL at 10:34

## 2024-05-04 RX ADMIN — CARBOXYMETHYLCELLULOSE SODIUM 1 DROP: 10 GEL OPHTHALMIC at 17:01

## 2024-05-04 ASSESSMENT — PAIN SCALES - GENERAL
PAINLEVEL_OUTOF10: 0
PAINLEVEL_OUTOF10: 10
PAINLEVEL_OUTOF10: 10
PAINLEVEL_OUTOF10: 0
PAINLEVEL_OUTOF10: 0
PAINLEVEL_OUTOF10: 7
PAINLEVEL_OUTOF10: 0

## 2024-05-04 ASSESSMENT — PAIN DESCRIPTION - DESCRIPTORS
DESCRIPTORS: ACHING
DESCRIPTORS: ACHING

## 2024-05-04 ASSESSMENT — PAIN DESCRIPTION - LOCATION
LOCATION: BACK
LOCATION: BACK

## 2024-05-04 ASSESSMENT — PAIN SCALES - WONG BAKER: WONGBAKER_NUMERICALRESPONSE: NO HURT

## 2024-05-05 LAB
ALBUMIN SERPL-MCNC: 2.9 G/DL (ref 3.4–5)
ALBUMIN/GLOB SERPL: 1.1 (ref 0.8–1.7)
ALP SERPL-CCNC: 153 U/L (ref 45–117)
ALT SERPL-CCNC: 45 U/L (ref 13–56)
ANION GAP SERPL CALC-SCNC: 9 MMOL/L (ref 3–18)
AST SERPL-CCNC: 20 U/L (ref 10–38)
BASOPHILS # BLD: 0 K/UL (ref 0–0.1)
BASOPHILS NFR BLD: 0 % (ref 0–2)
BILIRUB SERPL-MCNC: 0.8 MG/DL (ref 0.2–1)
BUN SERPL-MCNC: 90 MG/DL (ref 7–18)
BUN/CREAT SERPL: 16 (ref 12–20)
CALCIUM SERPL-MCNC: 8.7 MG/DL (ref 8.5–10.1)
CHLORIDE SERPL-SCNC: 95 MMOL/L (ref 100–111)
CO2 SERPL-SCNC: 28 MMOL/L (ref 21–32)
CREAT SERPL-MCNC: 5.46 MG/DL (ref 0.6–1.3)
DIFFERENTIAL METHOD BLD: ABNORMAL
EOSINOPHIL # BLD: 0 K/UL (ref 0–0.4)
EOSINOPHIL NFR BLD: 0 % (ref 0–5)
ERYTHROCYTE [DISTWIDTH] IN BLOOD BY AUTOMATED COUNT: 19.9 % (ref 11.6–14.5)
GLOBULIN SER CALC-MCNC: 2.7 G/DL (ref 2–4)
GLUCOSE BLD STRIP.AUTO-MCNC: 102 MG/DL (ref 70–110)
GLUCOSE BLD STRIP.AUTO-MCNC: 210 MG/DL (ref 70–110)
GLUCOSE BLD STRIP.AUTO-MCNC: 272 MG/DL (ref 70–110)
GLUCOSE BLD STRIP.AUTO-MCNC: 331 MG/DL (ref 70–110)
GLUCOSE SERPL-MCNC: 151 MG/DL (ref 74–99)
HCT VFR BLD AUTO: 24.2 % (ref 35–45)
HGB BLD-MCNC: 7.5 G/DL (ref 12–16)
IMM GRANULOCYTES # BLD AUTO: 0.2 K/UL (ref 0–0.04)
IMM GRANULOCYTES NFR BLD AUTO: 2 % (ref 0–0.5)
LYMPHOCYTES # BLD: 0.6 K/UL (ref 0.9–3.6)
LYMPHOCYTES NFR BLD: 5 % (ref 21–52)
MCH RBC QN AUTO: 29.5 PG (ref 24–34)
MCHC RBC AUTO-ENTMCNC: 31 G/DL (ref 31–37)
MCV RBC AUTO: 95.3 FL (ref 78–100)
MONOCYTES # BLD: 0.8 K/UL (ref 0.05–1.2)
MONOCYTES NFR BLD: 8 % (ref 3–10)
NEUTS SEG # BLD: 9 K/UL (ref 1.8–8)
NEUTS SEG NFR BLD: 85 % (ref 40–73)
NRBC # BLD: 0.57 K/UL (ref 0–0.01)
NRBC BLD-RTO: 5.4 PER 100 WBC
PLATELET # BLD AUTO: 278 K/UL (ref 135–420)
PMV BLD AUTO: 10 FL (ref 9.2–11.8)
POTASSIUM SERPL-SCNC: 4.8 MMOL/L (ref 3.5–5.5)
PROT SERPL-MCNC: 5.6 G/DL (ref 6.4–8.2)
RBC # BLD AUTO: 2.54 M/UL (ref 4.2–5.3)
SODIUM SERPL-SCNC: 132 MMOL/L (ref 136–145)
WBC # BLD AUTO: 10.6 K/UL (ref 4.6–13.2)

## 2024-05-05 PROCEDURE — 6370000000 HC RX 637 (ALT 250 FOR IP): Performed by: INTERNAL MEDICINE

## 2024-05-05 PROCEDURE — 6370000000 HC RX 637 (ALT 250 FOR IP): Performed by: STUDENT IN AN ORGANIZED HEALTH CARE EDUCATION/TRAINING PROGRAM

## 2024-05-05 PROCEDURE — 94640 AIRWAY INHALATION TREATMENT: CPT

## 2024-05-05 PROCEDURE — 6370000000 HC RX 637 (ALT 250 FOR IP): Performed by: PHYSICIAN ASSISTANT

## 2024-05-05 PROCEDURE — 80053 COMPREHEN METABOLIC PANEL: CPT

## 2024-05-05 PROCEDURE — 1100000000 HC RM PRIVATE

## 2024-05-05 PROCEDURE — 6370000000 HC RX 637 (ALT 250 FOR IP): Performed by: HOSPITALIST

## 2024-05-05 PROCEDURE — 97535 SELF CARE MNGMENT TRAINING: CPT

## 2024-05-05 PROCEDURE — 82962 GLUCOSE BLOOD TEST: CPT

## 2024-05-05 PROCEDURE — 36415 COLL VENOUS BLD VENIPUNCTURE: CPT

## 2024-05-05 PROCEDURE — 2580000003 HC RX 258: Performed by: STUDENT IN AN ORGANIZED HEALTH CARE EDUCATION/TRAINING PROGRAM

## 2024-05-05 PROCEDURE — 99232 SBSQ HOSP IP/OBS MODERATE 35: CPT | Performed by: INTERNAL MEDICINE

## 2024-05-05 PROCEDURE — 6360000002 HC RX W HCPCS: Performed by: INTERNAL MEDICINE

## 2024-05-05 PROCEDURE — 97530 THERAPEUTIC ACTIVITIES: CPT

## 2024-05-05 PROCEDURE — 6370000000 HC RX 637 (ALT 250 FOR IP): Performed by: EMERGENCY MEDICINE

## 2024-05-05 PROCEDURE — 85025 COMPLETE CBC W/AUTO DIFF WBC: CPT

## 2024-05-05 PROCEDURE — 97164 PT RE-EVAL EST PLAN CARE: CPT

## 2024-05-05 PROCEDURE — 94668 MNPJ CHEST WALL SBSQ: CPT

## 2024-05-05 PROCEDURE — 97168 OT RE-EVAL EST PLAN CARE: CPT

## 2024-05-05 PROCEDURE — 94761 N-INVAS EAR/PLS OXIMETRY MLT: CPT

## 2024-05-05 RX ORDER — PREDNISONE 10 MG/1
10 TABLET ORAL DAILY
Status: DISCONTINUED | OUTPATIENT
Start: 2024-05-06 | End: 2024-05-07 | Stop reason: HOSPADM

## 2024-05-05 RX ORDER — CARBOXYMETHYLCELLULOSE SODIUM 10 MG/ML
1 GEL OPHTHALMIC DAILY
Status: DISCONTINUED | OUTPATIENT
Start: 2024-05-06 | End: 2024-05-07 | Stop reason: HOSPADM

## 2024-05-05 RX ADMIN — PREDNISONE 20 MG: 20 TABLET ORAL at 09:08

## 2024-05-05 RX ADMIN — POLYVINYL ALCOHOL, POVIDONE 1 DROP: 14; 6 SOLUTION/ DROPS OPHTHALMIC at 05:31

## 2024-05-05 RX ADMIN — PANTOPRAZOLE SODIUM 40 MG: 40 TABLET, DELAYED RELEASE ORAL at 05:31

## 2024-05-05 RX ADMIN — CYANOCOBALAMIN TAB 1000 MCG 1000 MCG: 1000 TAB at 09:09

## 2024-05-05 RX ADMIN — IPRATROPIUM BROMIDE AND ALBUTEROL SULFATE 1 DOSE: .5; 3 SOLUTION RESPIRATORY (INHALATION) at 03:19

## 2024-05-05 RX ADMIN — TRAMADOL HYDROCHLORIDE 50 MG: 50 TABLET ORAL at 21:21

## 2024-05-05 RX ADMIN — FOLIC ACID 1 MG: 1 TABLET ORAL at 09:08

## 2024-05-05 RX ADMIN — IPRATROPIUM BROMIDE AND ALBUTEROL SULFATE 1 DOSE: .5; 3 SOLUTION RESPIRATORY (INHALATION) at 12:16

## 2024-05-05 RX ADMIN — BUDESONIDE 1 MG: 1 SUSPENSION RESPIRATORY (INHALATION) at 07:39

## 2024-05-05 RX ADMIN — IPRATROPIUM BROMIDE AND ALBUTEROL SULFATE 1 DOSE: .5; 3 SOLUTION RESPIRATORY (INHALATION) at 15:32

## 2024-05-05 RX ADMIN — IPRATROPIUM BROMIDE AND ALBUTEROL SULFATE 1 DOSE: .5; 3 SOLUTION RESPIRATORY (INHALATION) at 19:56

## 2024-05-05 RX ADMIN — INSULIN LISPRO 4 UNITS: 100 INJECTION, SOLUTION INTRAVENOUS; SUBCUTANEOUS at 21:18

## 2024-05-05 RX ADMIN — SEVELAMER CARBONATE 800 MG: 800 TABLET, FILM COATED ORAL at 12:25

## 2024-05-05 RX ADMIN — Medication 1 MG: at 09:09

## 2024-05-05 RX ADMIN — ARFORMOTEROL TARTRATE 15 MCG: 15 SOLUTION RESPIRATORY (INHALATION) at 19:56

## 2024-05-05 RX ADMIN — ASPIRIN 81 MG CHEWABLE TABLET 81 MG: 81 TABLET CHEWABLE at 09:09

## 2024-05-05 RX ADMIN — INSULIN GLARGINE 20 UNITS: 100 INJECTION, SOLUTION SUBCUTANEOUS at 21:18

## 2024-05-05 RX ADMIN — INSULIN LISPRO 4 UNITS: 100 INJECTION, SOLUTION INTRAVENOUS; SUBCUTANEOUS at 12:24

## 2024-05-05 RX ADMIN — METOPROLOL SUCCINATE 25 MG: 25 TABLET, EXTENDED RELEASE ORAL at 09:08

## 2024-05-05 RX ADMIN — AMIODARONE HYDROCHLORIDE 200 MG: 200 TABLET ORAL at 09:09

## 2024-05-05 RX ADMIN — HEPARIN SODIUM 5000 UNITS: 5000 INJECTION INTRAVENOUS; SUBCUTANEOUS at 21:31

## 2024-05-05 RX ADMIN — METOPROLOL SUCCINATE 25 MG: 25 TABLET, EXTENDED RELEASE ORAL at 21:22

## 2024-05-05 RX ADMIN — ARFORMOTEROL TARTRATE 15 MCG: 15 SOLUTION RESPIRATORY (INHALATION) at 07:38

## 2024-05-05 RX ADMIN — SODIUM CHLORIDE, PRESERVATIVE FREE 10 ML: 5 INJECTION INTRAVENOUS at 21:23

## 2024-05-05 RX ADMIN — GABAPENTIN 300 MG: 300 CAPSULE ORAL at 18:20

## 2024-05-05 RX ADMIN — SEVELAMER CARBONATE 800 MG: 800 TABLET, FILM COATED ORAL at 09:08

## 2024-05-05 RX ADMIN — ATORVASTATIN CALCIUM 80 MG: 40 TABLET, FILM COATED ORAL at 21:22

## 2024-05-05 RX ADMIN — SODIUM CHLORIDE, PRESERVATIVE FREE 10 ML: 5 INJECTION INTRAVENOUS at 09:09

## 2024-05-05 RX ADMIN — POLYVINYL ALCOHOL, POVIDONE 1 DROP: 14; 6 SOLUTION/ DROPS OPHTHALMIC at 00:26

## 2024-05-05 RX ADMIN — HEPARIN SODIUM 5000 UNITS: 5000 INJECTION INTRAVENOUS; SUBCUTANEOUS at 05:30

## 2024-05-05 RX ADMIN — IPRATROPIUM BROMIDE AND ALBUTEROL SULFATE 1 DOSE: .5; 3 SOLUTION RESPIRATORY (INHALATION) at 23:49

## 2024-05-05 RX ADMIN — SEVELAMER CARBONATE 800 MG: 800 TABLET, FILM COATED ORAL at 16:23

## 2024-05-05 RX ADMIN — HEPARIN SODIUM 5000 UNITS: 5000 INJECTION INTRAVENOUS; SUBCUTANEOUS at 14:49

## 2024-05-05 RX ADMIN — MIDODRINE HYDROCHLORIDE 5 MG: 5 TABLET ORAL at 14:49

## 2024-05-05 RX ADMIN — MIDODRINE HYDROCHLORIDE 5 MG: 5 TABLET ORAL at 21:22

## 2024-05-05 RX ADMIN — CARBOXYMETHYLCELLULOSE SODIUM 1 DROP: 10 GEL OPHTHALMIC at 09:07

## 2024-05-05 RX ADMIN — INSULIN LISPRO 8 UNITS: 100 INJECTION, SOLUTION INTRAVENOUS; SUBCUTANEOUS at 16:23

## 2024-05-05 RX ADMIN — IPRATROPIUM BROMIDE AND ALBUTEROL SULFATE 1 DOSE: .5; 3 SOLUTION RESPIRATORY (INHALATION) at 07:38

## 2024-05-05 RX ADMIN — MIDODRINE HYDROCHLORIDE 5 MG: 5 TABLET ORAL at 09:09

## 2024-05-05 RX ADMIN — CHOLECALCIFEROL TAB 25 MCG (1000 UNIT) 2000 UNITS: 25 TAB at 09:09

## 2024-05-05 RX ADMIN — BUDESONIDE 1 MG: 1 SUSPENSION RESPIRATORY (INHALATION) at 19:56

## 2024-05-05 RX ADMIN — EZETIMIBE 10 MG: 10 TABLET ORAL at 09:09

## 2024-05-05 ASSESSMENT — PAIN DESCRIPTION - FREQUENCY: FREQUENCY: INTERMITTENT

## 2024-05-05 ASSESSMENT — PAIN SCALES - GENERAL
PAINLEVEL_OUTOF10: 0
PAINLEVEL_OUTOF10: 4
PAINLEVEL_OUTOF10: 0

## 2024-05-05 ASSESSMENT — PAIN - FUNCTIONAL ASSESSMENT: PAIN_FUNCTIONAL_ASSESSMENT: ACTIVITIES ARE NOT PREVENTED

## 2024-05-05 ASSESSMENT — PAIN DESCRIPTION - LOCATION: LOCATION: BACK

## 2024-05-05 ASSESSMENT — PAIN DESCRIPTION - DESCRIPTORS: DESCRIPTORS: ACHING

## 2024-05-06 LAB
ALBUMIN SERPL-MCNC: 3 G/DL (ref 3.4–5)
ALBUMIN/GLOB SERPL: 1.1 (ref 0.8–1.7)
ALP SERPL-CCNC: 157 U/L (ref 45–117)
ALT SERPL-CCNC: 61 U/L (ref 13–56)
ANION GAP SERPL CALC-SCNC: 9 MMOL/L (ref 3–18)
AST SERPL-CCNC: 20 U/L (ref 10–38)
BASOPHILS # BLD: 0 K/UL (ref 0–0.1)
BASOPHILS NFR BLD: 0 % (ref 0–2)
BILIRUB SERPL-MCNC: 0.6 MG/DL (ref 0.2–1)
BUN SERPL-MCNC: 110 MG/DL (ref 7–18)
BUN/CREAT SERPL: 16 (ref 12–20)
CALCIUM SERPL-MCNC: 8.9 MG/DL (ref 8.5–10.1)
CHLORIDE SERPL-SCNC: 95 MMOL/L (ref 100–111)
CO2 SERPL-SCNC: 26 MMOL/L (ref 21–32)
CREAT SERPL-MCNC: 6.84 MG/DL (ref 0.6–1.3)
DIFFERENTIAL METHOD BLD: ABNORMAL
EOSINOPHIL # BLD: 0 K/UL (ref 0–0.4)
EOSINOPHIL NFR BLD: 0 % (ref 0–5)
ERYTHROCYTE [DISTWIDTH] IN BLOOD BY AUTOMATED COUNT: 20.3 % (ref 11.6–14.5)
GLOBULIN SER CALC-MCNC: 2.8 G/DL (ref 2–4)
GLUCOSE BLD STRIP.AUTO-MCNC: 159 MG/DL (ref 70–110)
GLUCOSE BLD STRIP.AUTO-MCNC: 161 MG/DL (ref 70–110)
GLUCOSE BLD STRIP.AUTO-MCNC: 179 MG/DL (ref 70–110)
GLUCOSE BLD STRIP.AUTO-MCNC: 183 MG/DL (ref 70–110)
GLUCOSE BLD STRIP.AUTO-MCNC: 208 MG/DL (ref 70–110)
GLUCOSE SERPL-MCNC: 169 MG/DL (ref 74–99)
HCT VFR BLD AUTO: 23.8 % (ref 35–45)
HGB BLD-MCNC: 7.5 G/DL (ref 12–16)
IMM GRANULOCYTES # BLD AUTO: 0 K/UL (ref 0–0.04)
IMM GRANULOCYTES NFR BLD AUTO: 0 % (ref 0–0.5)
LYMPHOCYTES # BLD: 0.6 K/UL (ref 0.9–3.6)
LYMPHOCYTES NFR BLD: 5 % (ref 21–52)
MCH RBC QN AUTO: 30.5 PG (ref 24–34)
MCHC RBC AUTO-ENTMCNC: 31.5 G/DL (ref 31–37)
MCV RBC AUTO: 96.7 FL (ref 78–100)
MONOCYTES # BLD: 0.4 K/UL (ref 0.05–1.2)
MONOCYTES NFR BLD: 3 % (ref 3–10)
NEUTS SEG # BLD: 11.3 K/UL (ref 1.8–8)
NEUTS SEG NFR BLD: 92 % (ref 40–73)
NRBC # BLD: 0.75 K/UL (ref 0–0.01)
NRBC BLD-RTO: 6.1 PER 100 WBC
PLATELET # BLD AUTO: 283 K/UL (ref 135–420)
PLATELET COMMENT: ABNORMAL
PMV BLD AUTO: 10.1 FL (ref 9.2–11.8)
POTASSIUM SERPL-SCNC: 5.5 MMOL/L (ref 3.5–5.5)
PROT SERPL-MCNC: 5.8 G/DL (ref 6.4–8.2)
RBC # BLD AUTO: 2.46 M/UL (ref 4.2–5.3)
RBC MORPH BLD: ABNORMAL
SODIUM SERPL-SCNC: 130 MMOL/L (ref 136–145)
WBC # BLD AUTO: 12.3 K/UL (ref 4.6–13.2)

## 2024-05-06 PROCEDURE — 6360000002 HC RX W HCPCS: Performed by: INTERNAL MEDICINE

## 2024-05-06 PROCEDURE — 6370000000 HC RX 637 (ALT 250 FOR IP): Performed by: INTERNAL MEDICINE

## 2024-05-06 PROCEDURE — 6370000000 HC RX 637 (ALT 250 FOR IP): Performed by: STUDENT IN AN ORGANIZED HEALTH CARE EDUCATION/TRAINING PROGRAM

## 2024-05-06 PROCEDURE — 6370000000 HC RX 637 (ALT 250 FOR IP): Performed by: HOSPITALIST

## 2024-05-06 PROCEDURE — 94668 MNPJ CHEST WALL SBSQ: CPT

## 2024-05-06 PROCEDURE — 80053 COMPREHEN METABOLIC PANEL: CPT

## 2024-05-06 PROCEDURE — 6370000000 HC RX 637 (ALT 250 FOR IP): Performed by: EMERGENCY MEDICINE

## 2024-05-06 PROCEDURE — 6370000000 HC RX 637 (ALT 250 FOR IP): Performed by: PHYSICIAN ASSISTANT

## 2024-05-06 PROCEDURE — 1100000000 HC RM PRIVATE

## 2024-05-06 PROCEDURE — 2580000003 HC RX 258: Performed by: STUDENT IN AN ORGANIZED HEALTH CARE EDUCATION/TRAINING PROGRAM

## 2024-05-06 PROCEDURE — 94669 MECHANICAL CHEST WALL OSCILL: CPT

## 2024-05-06 PROCEDURE — 94640 AIRWAY INHALATION TREATMENT: CPT

## 2024-05-06 PROCEDURE — 36415 COLL VENOUS BLD VENIPUNCTURE: CPT

## 2024-05-06 PROCEDURE — 90935 HEMODIALYSIS ONE EVALUATION: CPT

## 2024-05-06 PROCEDURE — 99232 SBSQ HOSP IP/OBS MODERATE 35: CPT | Performed by: STUDENT IN AN ORGANIZED HEALTH CARE EDUCATION/TRAINING PROGRAM

## 2024-05-06 PROCEDURE — 85025 COMPLETE CBC W/AUTO DIFF WBC: CPT

## 2024-05-06 PROCEDURE — 82962 GLUCOSE BLOOD TEST: CPT

## 2024-05-06 PROCEDURE — 94761 N-INVAS EAR/PLS OXIMETRY MLT: CPT

## 2024-05-06 RX ORDER — MIDODRINE HYDROCHLORIDE 5 MG/1
5 TABLET ORAL 3 TIMES DAILY
Qty: 90 TABLET | Refills: 0 | Status: SHIPPED | DISCHARGE
Start: 2024-05-06 | End: 2024-06-05

## 2024-05-06 RX ORDER — IPRATROPIUM BROMIDE AND ALBUTEROL SULFATE 2.5; .5 MG/3ML; MG/3ML
1 SOLUTION RESPIRATORY (INHALATION)
Status: DISCONTINUED | OUTPATIENT
Start: 2024-05-06 | End: 2024-05-07 | Stop reason: HOSPADM

## 2024-05-06 RX ORDER — AMIODARONE HYDROCHLORIDE 200 MG/1
200 TABLET ORAL DAILY
Qty: 30 TABLET | Refills: 0 | Status: SHIPPED | DISCHARGE
Start: 2024-05-07 | End: 2024-06-06

## 2024-05-06 RX ORDER — PREDNISONE 10 MG/1
10 TABLET ORAL DAILY
Qty: 3 TABLET | Refills: 0 | Status: SHIPPED | DISCHARGE
Start: 2024-05-07 | End: 2024-05-10

## 2024-05-06 RX ORDER — CARBOXYMETHYLCELLULOSE SODIUM 10 MG/ML
1 GEL OPHTHALMIC DAILY
Qty: 4 EACH | Refills: 0 | Status: SHIPPED | DISCHARGE
Start: 2024-05-07 | End: 2024-06-06

## 2024-05-06 RX ORDER — METOPROLOL SUCCINATE 25 MG/1
25 TABLET, EXTENDED RELEASE ORAL 2 TIMES DAILY
Qty: 30 TABLET | Refills: 0 | Status: SHIPPED | DISCHARGE
Start: 2024-05-06

## 2024-05-06 RX ADMIN — ALLOPURINOL 100 MG: 100 TABLET ORAL at 21:43

## 2024-05-06 RX ADMIN — MIDODRINE HYDROCHLORIDE 5 MG: 5 TABLET ORAL at 08:47

## 2024-05-06 RX ADMIN — IPRATROPIUM BROMIDE AND ALBUTEROL SULFATE 1 DOSE: .5; 3 SOLUTION RESPIRATORY (INHALATION) at 07:54

## 2024-05-06 RX ADMIN — INSULIN LISPRO 4 UNITS: 100 INJECTION, SOLUTION INTRAVENOUS; SUBCUTANEOUS at 17:27

## 2024-05-06 RX ADMIN — BUDESONIDE 1 MG: 1 SUSPENSION RESPIRATORY (INHALATION) at 20:12

## 2024-05-06 RX ADMIN — INSULIN GLARGINE 20 UNITS: 100 INJECTION, SOLUTION SUBCUTANEOUS at 21:43

## 2024-05-06 RX ADMIN — HEPARIN SODIUM 5000 UNITS: 5000 INJECTION INTRAVENOUS; SUBCUTANEOUS at 06:07

## 2024-05-06 RX ADMIN — ASPIRIN 81 MG CHEWABLE TABLET 81 MG: 81 TABLET CHEWABLE at 08:48

## 2024-05-06 RX ADMIN — PREDNISONE 10 MG: 10 TABLET ORAL at 08:49

## 2024-05-06 RX ADMIN — PANTOPRAZOLE SODIUM 40 MG: 40 TABLET, DELAYED RELEASE ORAL at 05:12

## 2024-05-06 RX ADMIN — ATORVASTATIN CALCIUM 80 MG: 40 TABLET, FILM COATED ORAL at 21:44

## 2024-05-06 RX ADMIN — IPRATROPIUM BROMIDE AND ALBUTEROL SULFATE 1 DOSE: .5; 3 SOLUTION RESPIRATORY (INHALATION) at 04:03

## 2024-05-06 RX ADMIN — EZETIMIBE 10 MG: 10 TABLET ORAL at 08:47

## 2024-05-06 RX ADMIN — METOPROLOL SUCCINATE 25 MG: 25 TABLET, EXTENDED RELEASE ORAL at 21:43

## 2024-05-06 RX ADMIN — ARFORMOTEROL TARTRATE 15 MCG: 15 SOLUTION RESPIRATORY (INHALATION) at 20:12

## 2024-05-06 RX ADMIN — SODIUM CHLORIDE, PRESERVATIVE FREE 10 ML: 5 INJECTION INTRAVENOUS at 21:48

## 2024-05-06 RX ADMIN — FOLIC ACID 1 MG: 1 TABLET ORAL at 08:49

## 2024-05-06 RX ADMIN — GABAPENTIN 300 MG: 300 CAPSULE ORAL at 17:27

## 2024-05-06 RX ADMIN — BUDESONIDE 1 MG: 1 SUSPENSION RESPIRATORY (INHALATION) at 07:54

## 2024-05-06 RX ADMIN — Medication 1 MG: at 08:48

## 2024-05-06 RX ADMIN — HEPARIN SODIUM 5000 UNITS: 5000 INJECTION INTRAVENOUS; SUBCUTANEOUS at 21:44

## 2024-05-06 RX ADMIN — ACETAMINOPHEN 325MG 650 MG: 325 TABLET ORAL at 05:11

## 2024-05-06 RX ADMIN — CYANOCOBALAMIN TAB 1000 MCG 1000 MCG: 1000 TAB at 08:48

## 2024-05-06 RX ADMIN — IPRATROPIUM BROMIDE AND ALBUTEROL SULFATE 1 DOSE: .5; 3 SOLUTION RESPIRATORY (INHALATION) at 20:12

## 2024-05-06 RX ADMIN — TRAMADOL HYDROCHLORIDE 50 MG: 50 TABLET ORAL at 08:54

## 2024-05-06 RX ADMIN — IPRATROPIUM BROMIDE AND ALBUTEROL SULFATE 1 DOSE: .5; 3 SOLUTION RESPIRATORY (INHALATION) at 14:21

## 2024-05-06 RX ADMIN — SODIUM CHLORIDE, PRESERVATIVE FREE 10 ML: 5 INJECTION INTRAVENOUS at 08:56

## 2024-05-06 RX ADMIN — HEPARIN SODIUM 5000 UNITS: 5000 INJECTION INTRAVENOUS; SUBCUTANEOUS at 15:25

## 2024-05-06 RX ADMIN — SEVELAMER CARBONATE 800 MG: 800 TABLET, FILM COATED ORAL at 08:47

## 2024-05-06 RX ADMIN — CHOLECALCIFEROL TAB 25 MCG (1000 UNIT) 2000 UNITS: 25 TAB at 08:54

## 2024-05-06 RX ADMIN — SEVELAMER CARBONATE 800 MG: 800 TABLET, FILM COATED ORAL at 17:27

## 2024-05-06 RX ADMIN — ARFORMOTEROL TARTRATE 15 MCG: 15 SOLUTION RESPIRATORY (INHALATION) at 07:54

## 2024-05-06 RX ADMIN — EPOETIN ALFA-EPBX 6000 UNITS: 3000 INJECTION, SOLUTION INTRAVENOUS; SUBCUTANEOUS at 17:28

## 2024-05-06 RX ADMIN — AMIODARONE HYDROCHLORIDE 200 MG: 200 TABLET ORAL at 08:49

## 2024-05-06 ASSESSMENT — PAIN - FUNCTIONAL ASSESSMENT
PAIN_FUNCTIONAL_ASSESSMENT: ACTIVITIES ARE NOT PREVENTED
PAIN_FUNCTIONAL_ASSESSMENT: ACTIVITIES ARE NOT PREVENTED

## 2024-05-06 ASSESSMENT — PAIN DESCRIPTION - LOCATION
LOCATION: BACK
LOCATION: THROAT

## 2024-05-06 ASSESSMENT — PAIN DESCRIPTION - ONSET
ONSET: ON-GOING
ONSET: GRADUAL

## 2024-05-06 ASSESSMENT — PAIN DESCRIPTION - PAIN TYPE: TYPE: CHRONIC PAIN

## 2024-05-06 ASSESSMENT — PAIN DESCRIPTION - FREQUENCY
FREQUENCY: INTERMITTENT
FREQUENCY: INTERMITTENT

## 2024-05-06 ASSESSMENT — PAIN SCALES - GENERAL
PAINLEVEL_OUTOF10: 0
PAINLEVEL_OUTOF10: 3
PAINLEVEL_OUTOF10: 10
PAINLEVEL_OUTOF10: 0

## 2024-05-06 ASSESSMENT — PAIN DESCRIPTION - DESCRIPTORS: DESCRIPTORS: ACHING

## 2024-05-06 ASSESSMENT — PAIN SCALES - WONG BAKER: WONGBAKER_NUMERICALRESPONSE: NO HURT

## 2024-05-06 ASSESSMENT — PAIN DESCRIPTION - ORIENTATION: ORIENTATION: MID

## 2024-05-06 NOTE — CARE COORDINATION
05/06/24 1241   Avoidable Days   [facility] Auth     Yeny Patel RN, BSN, [facility]  Case Management  [phone]

## 2024-05-06 NOTE — DIALYSIS
HD Care plan  Time: 3 hrs  Dialysate:  2 K+   2.5 Ca++  Bath  Net UF: 3 L  Access: Aseptically care for MELL  AVF  Hemodynamic stability: Maintain BP WNL     Pre Dialysis:  Pt received from Unit Nurse Olga Headley , pt on a bed, A+O X 3, no s/s of acute distress noted. MELL AVF assessed, no abnormalities noted, bruit and thrill strong, AVF accessed using 15 G needles without any difficulty. Good flows achieved from both needles.     Intra Dialysis:  Time out / safety process performed per policy, Tx initiated at 0912.    AVF flowing with ease. For hemodynamic stability UF goal set at 3000 ml as tolerated.  Pt offered assistance with repositioning every 2 hours/prn    Vascular access visible and line connections remained intact throughout entire duration of treatment.   Vital signs checked every 15 mins.     Post Dialysis:  Tx completed at 1212,   Tolerated well , 3 L  removed. De-accessed per protocol.    Clot time 8 minutes for arterial, and 6 minutes for venous.   Dry dressings applied.  Bruit/Thrill present above and below dressings.  Post Dialysis report given to unit Nurse Espinoza

## 2024-05-06 NOTE — DISCHARGE SUMMARY
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  The patient was intubated on 4/21/2024 for respiratory distress relating to dialysis complications and fluid overload.  Patient had an episode of SVT and was given adenosine.  Patient placed on amiodarone for A-fib with RVR.  Patient was given Zosyn in the ICU.  She also had an ileus versus obstruction at that time.  The patient received 1 unit of PRBCs for chronic anemia.  Cardiology was consulted who recommended continuing him amiodarone and metoprolol XL for rate control.  Anticoagulation was discussed but patient very high risk due to past history of GI bleed and AVM so she was just continued on aspirin, a watchman was to be considered in the future.  SNF discharge was arranged and the patient was agreeable.  Follow-up was in place.    Consults: Nephrology, pulmonology, cardiology    Significant Diagnostic Studies:   Venous duplex lower extremities bilaterally for 30 2024    No evidence of deep vein or superficial vein thrombosis in the right lower extremity. Vessels demonstrate normal compressibility, color filling, and phasic and spontaneous flow.    No evidence of deep vein or superficial vein thrombosis in the left lower extremity. Vessels demonstrate normal compressibility, color filling, and phasic and spontaneous flow.    CT abdomen pelvis 04/25/2024  1.  No acute findings along the gastrointestinal tract.  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  No convincing evidence of  developing SBO.     2.  New bilateral lower lobe posterior medial aspect consolidative opacities and  associated adjacent infiltrative densities.  Suggestive of bilateral pneumonia  vs. pulmonary hemorrhage.     3.  Fibroid uterus.     4.  Stable circumscribed hypodensity in the right kidney, favor renal cyst    Vascular duplex upper extremity bilateral 04/25/2024      Non-occlusive superficial vein thrombosis in the cephalic vein

## 2024-05-06 NOTE — CARE COORDINATION
Received call from Maira from [facility] that auth was received.  She states patient is able to transport tomorrow and is requesting a UAI.  Screening ID # : [id number]   Will ask Dr. Allan to please sign and authorize the UAI.  Updated pt on auth approval.    Yeny Patel RN, BSN, [facility]  Case Management  [phone]

## 2024-05-06 NOTE — CARE COORDINATION
Discharge order noted. Spoke with Maira from [facility] and she states that they have received auth for the dialysis portion, but they are waiting on the patient's medical auth. She is hoping to get that later today.  Updated dialysis flowsheets forwarded through [facility] to [facility].     Yeny Patel RN, BSN, [facility]  Case Management  [phone]

## 2024-05-07 VITALS
TEMPERATURE: 97.8 F | SYSTOLIC BLOOD PRESSURE: 116 MMHG | RESPIRATION RATE: 20 BRPM | DIASTOLIC BLOOD PRESSURE: 65 MMHG | WEIGHT: 181.22 LBS | HEIGHT: 61 IN | HEART RATE: 80 BPM | OXYGEN SATURATION: 95 % | BODY MASS INDEX: 34.21 KG/M2

## 2024-05-07 LAB
ANION GAP SERPL CALC-SCNC: 7 MMOL/L (ref 3–18)
BACTERIA SPEC CULT: NORMAL
BASOPHILS # BLD: 0 K/UL (ref 0–0.1)
BASOPHILS NFR BLD: 0 % (ref 0–2)
BUN SERPL-MCNC: 67 MG/DL (ref 7–18)
BUN/CREAT SERPL: 15 (ref 12–20)
CALCIUM SERPL-MCNC: 9.6 MG/DL (ref 8.5–10.1)
CHLORIDE SERPL-SCNC: 101 MMOL/L (ref 100–111)
CO2 SERPL-SCNC: 26 MMOL/L (ref 21–32)
CREAT SERPL-MCNC: 4.58 MG/DL (ref 0.6–1.3)
DIFFERENTIAL METHOD BLD: ABNORMAL
EOSINOPHIL # BLD: 0 K/UL (ref 0–0.4)
EOSINOPHIL NFR BLD: 0 % (ref 0–5)
ERYTHROCYTE [DISTWIDTH] IN BLOOD BY AUTOMATED COUNT: 20.8 % (ref 11.6–14.5)
GLUCOSE BLD STRIP.AUTO-MCNC: 105 MG/DL (ref 70–110)
GLUCOSE BLD STRIP.AUTO-MCNC: 132 MG/DL (ref 70–110)
GLUCOSE SERPL-MCNC: 108 MG/DL (ref 74–99)
HCT VFR BLD AUTO: 25.5 % (ref 35–45)
HGB BLD-MCNC: 8 G/DL (ref 12–16)
IMM GRANULOCYTES # BLD AUTO: 0.3 K/UL (ref 0–0.04)
IMM GRANULOCYTES NFR BLD AUTO: 3 % (ref 0–0.5)
LYMPHOCYTES # BLD: 1.5 K/UL (ref 0.9–3.6)
LYMPHOCYTES NFR BLD: 12 % (ref 21–52)
MCH RBC QN AUTO: 31 PG (ref 24–34)
MCHC RBC AUTO-ENTMCNC: 31.4 G/DL (ref 31–37)
MCV RBC AUTO: 98.8 FL (ref 78–100)
MONOCYTES # BLD: 1.2 K/UL (ref 0.05–1.2)
MONOCYTES NFR BLD: 10 % (ref 3–10)
NEUTS SEG # BLD: 9.6 K/UL (ref 1.8–8)
NEUTS SEG NFR BLD: 76 % (ref 40–73)
NRBC # BLD: 0.97 K/UL (ref 0–0.01)
NRBC BLD-RTO: 7.7 PER 100 WBC
PLATELET # BLD AUTO: 278 K/UL (ref 135–420)
PMV BLD AUTO: 10 FL (ref 9.2–11.8)
POTASSIUM SERPL-SCNC: 4.9 MMOL/L (ref 3.5–5.5)
RBC # BLD AUTO: 2.58 M/UL (ref 4.2–5.3)
SERVICE CMNT-IMP: NORMAL
SODIUM SERPL-SCNC: 134 MMOL/L (ref 136–145)
WBC # BLD AUTO: 12.6 K/UL (ref 4.6–13.2)

## 2024-05-07 PROCEDURE — 2580000003 HC RX 258: Performed by: STUDENT IN AN ORGANIZED HEALTH CARE EDUCATION/TRAINING PROGRAM

## 2024-05-07 PROCEDURE — 85025 COMPLETE CBC W/AUTO DIFF WBC: CPT

## 2024-05-07 PROCEDURE — 6370000000 HC RX 637 (ALT 250 FOR IP): Performed by: PHYSICIAN ASSISTANT

## 2024-05-07 PROCEDURE — 94761 N-INVAS EAR/PLS OXIMETRY MLT: CPT

## 2024-05-07 PROCEDURE — 80048 BASIC METABOLIC PNL TOTAL CA: CPT

## 2024-05-07 PROCEDURE — 82962 GLUCOSE BLOOD TEST: CPT

## 2024-05-07 PROCEDURE — 6360000002 HC RX W HCPCS: Performed by: INTERNAL MEDICINE

## 2024-05-07 PROCEDURE — 6370000000 HC RX 637 (ALT 250 FOR IP): Performed by: HOSPITALIST

## 2024-05-07 PROCEDURE — 36415 COLL VENOUS BLD VENIPUNCTURE: CPT

## 2024-05-07 PROCEDURE — 94640 AIRWAY INHALATION TREATMENT: CPT

## 2024-05-07 PROCEDURE — 6370000000 HC RX 637 (ALT 250 FOR IP): Performed by: INTERNAL MEDICINE

## 2024-05-07 PROCEDURE — 99239 HOSP IP/OBS DSCHRG MGMT >30: CPT | Performed by: STUDENT IN AN ORGANIZED HEALTH CARE EDUCATION/TRAINING PROGRAM

## 2024-05-07 PROCEDURE — 6370000000 HC RX 637 (ALT 250 FOR IP): Performed by: EMERGENCY MEDICINE

## 2024-05-07 PROCEDURE — 6370000000 HC RX 637 (ALT 250 FOR IP): Performed by: STUDENT IN AN ORGANIZED HEALTH CARE EDUCATION/TRAINING PROGRAM

## 2024-05-07 RX ADMIN — AMIODARONE HYDROCHLORIDE 200 MG: 200 TABLET ORAL at 08:36

## 2024-05-07 RX ADMIN — PREDNISONE 10 MG: 10 TABLET ORAL at 08:37

## 2024-05-07 RX ADMIN — IPRATROPIUM BROMIDE AND ALBUTEROL SULFATE 1 DOSE: .5; 3 SOLUTION RESPIRATORY (INHALATION) at 02:42

## 2024-05-07 RX ADMIN — FOLIC ACID 1 MG: 1 TABLET ORAL at 08:37

## 2024-05-07 RX ADMIN — ARFORMOTEROL TARTRATE 15 MCG: 15 SOLUTION RESPIRATORY (INHALATION) at 08:57

## 2024-05-07 RX ADMIN — CARBOXYMETHYLCELLULOSE SODIUM 1 DROP: 10 GEL OPHTHALMIC at 08:37

## 2024-05-07 RX ADMIN — MIDODRINE HYDROCHLORIDE 5 MG: 5 TABLET ORAL at 08:37

## 2024-05-07 RX ADMIN — CYANOCOBALAMIN TAB 1000 MCG 1000 MCG: 1000 TAB at 08:36

## 2024-05-07 RX ADMIN — PANTOPRAZOLE SODIUM 40 MG: 40 TABLET, DELAYED RELEASE ORAL at 05:58

## 2024-05-07 RX ADMIN — CHOLECALCIFEROL TAB 25 MCG (1000 UNIT) 2000 UNITS: 25 TAB at 08:36

## 2024-05-07 RX ADMIN — SEVELAMER CARBONATE 800 MG: 800 TABLET, FILM COATED ORAL at 08:36

## 2024-05-07 RX ADMIN — ASPIRIN 81 MG CHEWABLE TABLET 81 MG: 81 TABLET CHEWABLE at 08:36

## 2024-05-07 RX ADMIN — Medication 1 MG: at 08:37

## 2024-05-07 RX ADMIN — BUDESONIDE 1 MG: 1 SUSPENSION RESPIRATORY (INHALATION) at 08:57

## 2024-05-07 RX ADMIN — HEPARIN SODIUM 5000 UNITS: 5000 INJECTION INTRAVENOUS; SUBCUTANEOUS at 05:58

## 2024-05-07 RX ADMIN — SODIUM CHLORIDE, PRESERVATIVE FREE 10 ML: 5 INJECTION INTRAVENOUS at 08:37

## 2024-05-07 RX ADMIN — IPRATROPIUM BROMIDE AND ALBUTEROL SULFATE 1 DOSE: .5; 3 SOLUTION RESPIRATORY (INHALATION) at 08:57

## 2024-05-07 RX ADMIN — EZETIMIBE 10 MG: 10 TABLET ORAL at 08:37

## 2024-05-07 ASSESSMENT — PAIN SCALES - GENERAL
PAINLEVEL_OUTOF10: 0

## 2024-05-07 NOTE — CARE COORDINATION
Transition of Care Plan to SNF/Rehab      Patient Name: Christy Donahue                   YOB: 1950    SNF/Rehab Transition:  Patient has been accepted to [facility] SNF/Rehab and meets criteria for admission.   Patient will transported by Medicaid transport and expected to leave at this afternoon.    Medicaid Long-term Services and Supports(LTSS) screening completion: Yes    Three Inpatient Midnights for Medicare: Yes    Current Code Status:   Code Status: Full Code     Last Weight:   Wt Readings from Last 1 Encounters:   05/06/24 82.2 kg (181 lb 3.5 oz)       Weightbearing Status:     IV Medication, IV Site, Device Type: No    Dialysis: Yes       O2 Needs (including O2, Bipap, Cpap, ect.): no    Covid Vaccine Dates/ if known:    Internal Administration   First Dose      Second Dose           Last COVID Lab SARS-CoV-2 ( )   Date Value   08/08/2022 Detected (AA)     SARS-CoV-2, PCR ( )   Date Value   05/01/2024 Not detected     SARS-CoV-2, Rapid ( )   Date Value   12/08/2022 Not detected     POC Hugh's Test ( )   Date Value   04/24/2024 Positive     POC Chloride (mmol/L)   Date Value   04/21/2024 105     POC Creatinine (mg/dL)   Date Value   04/21/2024 4.97 (H)     POC Device ( )   Date Value   09/20/2022 ROOM AIR     POC Draw Site ( )   Date Value   09/20/2022 LEFT RADIAL     FIO2 (%)   Date Value   04/24/2024 30     POC Flow (L/M)   Date Value   03/18/2021 3     POC Glucose (mg/dL)   Date Value   05/07/2024 132 (H)     POC HCO3 (MMOL/L)   Date Value   04/24/2024 23.2     POC Ionized Calcium (mmol/L)   Date Value   04/21/2024 1.18     POC Mode ( )   Date Value   10/20/2019 ASSIST CONTROL     POC O2 SAT (%)   Date Value   04/24/2024 97.8 (H)     POC Occult Blood, Fecal ( )   Date Value   03/20/2024 Positive (A)     POC pCO2 (MMHG)   Date Value   04/24/2024 32.3 (L)     POC PEEP (cmH2O)   Date Value   04/24/2024 5     POC pH ( )   Date Value   04/24/2024 7.46 (H)     POC PO2

## 2024-05-07 NOTE — CARE COORDINATION
Signed and authorized UAI uploaded in [facility] to [facility].    Yeny Patel RN, BSN, [facility]  Case Management  [phone]

## 2024-05-07 NOTE — PROGRESS NOTES
This nurse spoke with Dr. Barclay.  EKG order placed for chest pain.  Please see primary RN's note for additional information.            
      RENAL DAILY PROGRESS NOTE  73-year-old female with past medical history of ESRD, on anticoagulations admitted for short of breath, following for ESRD management.   Subjective:       Complaint:          IMPRESSION:   ESRD, MWF  Access; AV fistula   Acute on chronic hypoxic respiratory failure,   COPD  Heart failure,   Anemia    PLAN:   Continue hd mon wed Friday,increase uf as tolerated  I and O  She was switched to [employer] mon wed Friday 11 am           Current Facility-Administered Medications   Medication Dose Route Frequency    metoprolol succinate (TOPROL XL) extended release tablet 25 mg  25 mg Oral BID    heparin (porcine) injection 5,000 Units  5,000 Units SubCUTAneous 3 times per day    amiodarone (CORDARONE) tablet 200 mg  200 mg Oral Daily    insulin lispro (HUMALOG) injection vial 0-8 Units  0-8 Units SubCUTAneous 4x Daily AC & HS    pantoprazole (PROTONIX) tablet 40 mg  40 mg Oral QAM AC    amoxicillin-clavulanate (AUGMENTIN) 500-125 MG per tablet 1 tablet  1 tablet Oral 2 times per day    0.9 % sodium chloride infusion   IntraVENous PRN    polyethylene glycol (GLYCOLAX) packet 34 g  34 g Oral Daily    methylPREDNISolone sodium succ (SOLU-MEDROL) 20 mg in sterile water 0.5 mL injection  20 mg IntraVENous Q12H    diatrizoate meglumine-sodium (GASTROGRAFIN) 66-10 % solution 30 mL  30 mL Oral ONCE PRN    midodrine (PROAMATINE) tablet 5 mg  5 mg Oral TID    0.9 % sodium chloride infusion   IntraVENous PRN    albumin human 25% IV solution 25 g  25 g IntraVENous PRN    nitroGLYCERIN (NITROSTAT) SL tablet 0.4 mg  0.4 mg SubLINGual Q5 Min PRN    Polyvinyl Alcohol-Povidone PF (REFRESH) 1.4-0.6 % ophthalmic solution 1 drop  1 drop Both Eyes Q4H    Or    lubrifresh P.M. (artificial tears) ophthalmic ointment   Both Eyes Q4H    ipratropium 0.5 mg-albuterol 2.5 mg (DUONEB) nebulizer solution 1 Dose  1 Dose Inhalation Q4H RT    epoetin agnes-epbx (RETACRIT) injection 6,000 Units  6,000 Units 
      RENAL DAILY PROGRESS NOTE  73-year-old female with past medical history of ESRD, on anticoagulations admitted for short of breath, following for ESRD management.   Subjective:       Complaint:          IMPRESSION:   ESRD, MWF  Access; AV fistula   Acute on chronic hypoxic respiratory failure,   COPD  Heart failure,   Anemia    PLAN:   Continue hd mon wed Friday,increase uf as tolerated  Increasing wbc today,? related to steroids,I will order blood cultures during dialysis   She was switched to [facility] mon wed Friday 11 am           Current Facility-Administered Medications   Medication Dose Route Frequency    insulin glargine (LANTUS) injection vial 20 Units  20 Units SubCUTAneous Nightly    insulin lispro (HUMALOG) injection vial 0-8 Units  0-8 Units SubCUTAneous TID WC    insulin lispro (HUMALOG) injection vial 0-4 Units  0-4 Units SubCUTAneous Nightly    traMADol (ULTRAM) tablet 50 mg  50 mg Oral Q12H PRN    metoprolol succinate (TOPROL XL) extended release tablet 25 mg  25 mg Oral BID    heparin (porcine) injection 5,000 Units  5,000 Units SubCUTAneous 3 times per day    amiodarone (CORDARONE) tablet 200 mg  200 mg Oral Daily    pantoprazole (PROTONIX) tablet 40 mg  40 mg Oral QAM AC    0.9 % sodium chloride infusion   IntraVENous PRN    polyethylene glycol (GLYCOLAX) packet 34 g  34 g Oral Daily    methylPREDNISolone sodium succ (SOLU-MEDROL) 20 mg in sterile water 0.5 mL injection  20 mg IntraVENous Q12H    diatrizoate meglumine-sodium (GASTROGRAFIN) 66-10 % solution 30 mL  30 mL Oral ONCE PRN    midodrine (PROAMATINE) tablet 5 mg  5 mg Oral TID    0.9 % sodium chloride infusion   IntraVENous PRN    albumin human 25% IV solution 25 g  25 g IntraVENous PRN    nitroGLYCERIN (NITROSTAT) SL tablet 0.4 mg  0.4 mg SubLINGual Q5 Min PRN    Polyvinyl Alcohol-Povidone PF (REFRESH) 1.4-0.6 % ophthalmic solution 1 drop  1 drop Both Eyes Q4H    Or    lubrifresh P.M. (artificial tears) ophthalmic 
      RENAL DAILY PROGRESS NOTE  73-year-old female with past medical history of ESRD, on anticoagulations admitted for short of breath, following for ESRD management.   Subjective:       Complaint:          IMPRESSION:   ESRD, MWF  Access; AV fistula   Acute on chronic hypoxic respiratory failure,   COPD  Heart failure,   Anemia    PLAN:   Continue hd mon wed Friday,increase uf as tolerated  She was switched to [facility] mon wed Friday 11 am           Current Facility-Administered Medications   Medication Dose Route Frequency    insulin glargine (LANTUS) injection vial 20 Units  20 Units SubCUTAneous Nightly    insulin lispro (HUMALOG) injection vial 0-16 Units  0-16 Units SubCUTAneous TID WC    insulin lispro (HUMALOG) injection vial 0-4 Units  0-4 Units SubCUTAneous Nightly    traMADol (ULTRAM) tablet 50 mg  50 mg Oral Q12H PRN    metoprolol succinate (TOPROL XL) extended release tablet 25 mg  25 mg Oral BID    heparin (porcine) injection 5,000 Units  5,000 Units SubCUTAneous 3 times per day    amiodarone (CORDARONE) tablet 200 mg  200 mg Oral Daily    pantoprazole (PROTONIX) tablet 40 mg  40 mg Oral QAM AC    0.9 % sodium chloride infusion   IntraVENous PRN    polyethylene glycol (GLYCOLAX) packet 34 g  34 g Oral Daily    methylPREDNISolone sodium succ (SOLU-MEDROL) 20 mg in sterile water 0.5 mL injection  20 mg IntraVENous Q12H    diatrizoate meglumine-sodium (GASTROGRAFIN) 66-10 % solution 30 mL  30 mL Oral ONCE PRN    midodrine (PROAMATINE) tablet 5 mg  5 mg Oral TID    0.9 % sodium chloride infusion   IntraVENous PRN    albumin human 25% IV solution 25 g  25 g IntraVENous PRN    nitroGLYCERIN (NITROSTAT) SL tablet 0.4 mg  0.4 mg SubLINGual Q5 Min PRN    Polyvinyl Alcohol-Povidone PF (REFRESH) 1.4-0.6 % ophthalmic solution 1 drop  1 drop Both Eyes Q4H    Or    lubrifresh P.M. (artificial tears) ophthalmic ointment   Both Eyes Q4H    ipratropium 0.5 mg-albuterol 2.5 mg (DUONEB) nebulizer solution 
      RENAL DAILY PROGRESS NOTE  73-year-old female with past medical history of ESRD, on anticoagulations admitted for short of breath, following for ESRD management.   Subjective:       Complaint:          IMPRESSION:   ESRD, MWF  Access; AV fistula   Acute on chronic hypoxic respiratory failure,   COPD  Heart failure,   Anemia    PLAN:   Seen during dialysis,attempt more uf as tolerated  I and O  She was switched to [facility] mon wed Friday 11 am           Current Facility-Administered Medications   Medication Dose Route Frequency    metoprolol succinate (TOPROL XL) extended release tablet 25 mg  25 mg Oral BID    amiodarone (CORDARONE) tablet 200 mg  200 mg Oral Daily    insulin lispro (HUMALOG) injection vial 0-8 Units  0-8 Units SubCUTAneous 4x Daily AC & HS    pantoprazole (PROTONIX) tablet 40 mg  40 mg Oral QAM AC    amoxicillin-clavulanate (AUGMENTIN) 500-125 MG per tablet 1 tablet  1 tablet Oral 2 times per day    0.9 % sodium chloride infusion   IntraVENous PRN    polyethylene glycol (GLYCOLAX) packet 34 g  34 g Oral Daily    methylPREDNISolone sodium succ (SOLU-MEDROL) 20 mg in sterile water 0.5 mL injection  20 mg IntraVENous Q12H    diatrizoate meglumine-sodium (GASTROGRAFIN) 66-10 % solution 30 mL  30 mL Oral ONCE PRN    midodrine (PROAMATINE) tablet 5 mg  5 mg Oral TID    0.9 % sodium chloride infusion   IntraVENous PRN    albumin human 25% IV solution 25 g  25 g IntraVENous PRN    nitroGLYCERIN (NITROSTAT) SL tablet 0.4 mg  0.4 mg SubLINGual Q5 Min PRN    Polyvinyl Alcohol-Povidone PF (REFRESH) 1.4-0.6 % ophthalmic solution 1 drop  1 drop Both Eyes Q4H    Or    lubrifresh P.M. (artificial tears) ophthalmic ointment   Both Eyes Q4H    ipratropium 0.5 mg-albuterol 2.5 mg (DUONEB) nebulizer solution 1 Dose  1 Dose Inhalation Q4H RT    epoetin agnes-epbx (RETACRIT) injection 6,000 Units  6,000 Units SubCUTAneous Once per day on Mon Wed Fri    [Held by provider] benzonatate (TESSALON) capsule 
      RENAL DAILY PROGRESS NOTE  73-year-old female with past medical history of ESRD, on anticoagulations admitted for short of breath, following for ESRD management.   Subjective:       Complaint:    No chest pain or sob or abdominal pain  Feels ok      IMPRESSION:   ESRD, MWF  Access; AV fistula   Acute on chronic hypoxic respiratory failure,   COPD  Heart failure,   Anemia s/p venofer loading  Secondary hyperparathyroidism   PLAN:   HD again on monday  C/w epogen  C/w renvela  Going to [facility] rehab on Monday?  She was switched to [employer] mon wed Friday 11 am           Current Facility-Administered Medications   Medication Dose Route Frequency    predniSONE (DELTASONE) tablet 20 mg  20 mg Oral Daily    carboxymethylcellulose PF (THERATEARS/REFRESH) 1 % ophthalmic gel 1 drop  1 drop Both Eyes 4x Daily    insulin glargine (LANTUS) injection vial 20 Units  20 Units SubCUTAneous Nightly    insulin lispro (HUMALOG) injection vial 0-16 Units  0-16 Units SubCUTAneous TID WC    insulin lispro (HUMALOG) injection vial 0-4 Units  0-4 Units SubCUTAneous Nightly    traMADol (ULTRAM) tablet 50 mg  50 mg Oral Q12H PRN    metoprolol succinate (TOPROL XL) extended release tablet 25 mg  25 mg Oral BID    heparin (porcine) injection 5,000 Units  5,000 Units SubCUTAneous 3 times per day    amiodarone (CORDARONE) tablet 200 mg  200 mg Oral Daily    pantoprazole (PROTONIX) tablet 40 mg  40 mg Oral QAM AC    0.9 % sodium chloride infusion   IntraVENous PRN    polyethylene glycol (GLYCOLAX) packet 34 g  34 g Oral Daily    diatrizoate meglumine-sodium (GASTROGRAFIN) 66-10 % solution 30 mL  30 mL Oral ONCE PRN    midodrine (PROAMATINE) tablet 5 mg  5 mg Oral TID    0.9 % sodium chloride infusion   IntraVENous PRN    albumin human 25% IV solution 25 g  25 g IntraVENous PRN    nitroGLYCERIN (NITROSTAT) SL tablet 0.4 mg  0.4 mg SubLINGual Q5 Min PRN    Polyvinyl Alcohol-Povidone PF (REFRESH) 1.4-0.6 % ophthalmic solution 1 
      RENAL DAILY PROGRESS NOTE  73-year-old female with past medical history of ESRD, on anticoagulations admitted for short of breath, following for ESRD management.   Subjective:       Complaint:    No chest pain or sob or abdominal pain  Feels ok      IMPRESSION:   ESRD, MWF  Access; AV fistula   Acute on chronic hypoxic respiratory failure,   COPD  Heart failure,   Anemia s/p venofer loading  Secondary hyperparathyroidism   PLAN:   HD again on monday  C/w epogen  C/w renvela  Going to [facility] rehab on Monday?  She was switched to [facility] [facility] mon wed Friday 11 am           Current Facility-Administered Medications   Medication Dose Route Frequency    predniSONE (DELTASONE) tablet 20 mg  20 mg Oral Daily    carboxymethylcellulose PF (THERATEARS/REFRESH) 1 % ophthalmic gel 1 drop  1 drop Both Eyes 4x Daily    insulin glargine (LANTUS) injection vial 20 Units  20 Units SubCUTAneous Nightly    insulin lispro (HUMALOG) injection vial 0-16 Units  0-16 Units SubCUTAneous TID WC    insulin lispro (HUMALOG) injection vial 0-4 Units  0-4 Units SubCUTAneous Nightly    traMADol (ULTRAM) tablet 50 mg  50 mg Oral Q12H PRN    metoprolol succinate (TOPROL XL) extended release tablet 25 mg  25 mg Oral BID    heparin (porcine) injection 5,000 Units  5,000 Units SubCUTAneous 3 times per day    amiodarone (CORDARONE) tablet 200 mg  200 mg Oral Daily    pantoprazole (PROTONIX) tablet 40 mg  40 mg Oral QAM AC    0.9 % sodium chloride infusion   IntraVENous PRN    polyethylene glycol (GLYCOLAX) packet 34 g  34 g Oral Daily    diatrizoate meglumine-sodium (GASTROGRAFIN) 66-10 % solution 30 mL  30 mL Oral ONCE PRN    midodrine (PROAMATINE) tablet 5 mg  5 mg Oral TID    0.9 % sodium chloride infusion   IntraVENous PRN    albumin human 25% IV solution 25 g  25 g IntraVENous PRN    nitroGLYCERIN (NITROSTAT) SL tablet 0.4 mg  0.4 mg SubLINGual Q5 Min PRN    Polyvinyl Alcohol-Povidone PF (REFRESH) 1.4-0.6 % ophthalmic solution 1 
      RENAL DAILY PROGRESS NOTE  73-year-old female with past medical history of ESRD, on anticoagulations admitted for short of breath, following for ESRD management.   Subjective:       Complaint:    No chest pain or sob or abdominal pain  Feels ok      IMPRESSION:   ESRD, MWF  Access; AV fistula   Acute on chronic hypoxic respiratory failure,   COPD  Heart failure,   Anemia s/p venofer loading  Secondary hyperparathyroidism   PLAN:   HD today  C/w epogen  C/w renvela  Get phos in am  Going to [facility] rehab on Monday?  She was switched to [employer] mon wed Friday 11 am           Current Facility-Administered Medications   Medication Dose Route Frequency    predniSONE (DELTASONE) tablet 20 mg  20 mg Oral Daily    carboxymethylcellulose PF (THERATEARS/REFRESH) 1 % ophthalmic gel 1 drop  1 drop Both Eyes 4x Daily    insulin glargine (LANTUS) injection vial 20 Units  20 Units SubCUTAneous Nightly    insulin lispro (HUMALOG) injection vial 0-16 Units  0-16 Units SubCUTAneous TID WC    insulin lispro (HUMALOG) injection vial 0-4 Units  0-4 Units SubCUTAneous Nightly    traMADol (ULTRAM) tablet 50 mg  50 mg Oral Q12H PRN    metoprolol succinate (TOPROL XL) extended release tablet 25 mg  25 mg Oral BID    heparin (porcine) injection 5,000 Units  5,000 Units SubCUTAneous 3 times per day    amiodarone (CORDARONE) tablet 200 mg  200 mg Oral Daily    pantoprazole (PROTONIX) tablet 40 mg  40 mg Oral QAM AC    0.9 % sodium chloride infusion   IntraVENous PRN    polyethylene glycol (GLYCOLAX) packet 34 g  34 g Oral Daily    diatrizoate meglumine-sodium (GASTROGRAFIN) 66-10 % solution 30 mL  30 mL Oral ONCE PRN    midodrine (PROAMATINE) tablet 5 mg  5 mg Oral TID    0.9 % sodium chloride infusion   IntraVENous PRN    albumin human 25% IV solution 25 g  25 g IntraVENous PRN    nitroGLYCERIN (NITROSTAT) SL tablet 0.4 mg  0.4 mg SubLINGual Q5 Min PRN    Polyvinyl Alcohol-Povidone PF (REFRESH) 1.4-0.6 % ophthalmic 
      RENAL DAILY PROGRESS NOTE  73-year-old female with past medical history of ESRD, on anticoagulations admitted for short of breath, following for ESRD management.   Subjective:       Complaint:    No chest pain or sob or abdominal pain  Feels ok  on 5/6/2024, I saw and examined patient during hemodialysis treatment. The patient was receiving hemodialysis for treatment of  renal failure. I have also reviewed vital signs, intake and output, lab results and recent events, and agreed with today's dialysis order.       IMPRESSION:   ESRD, MWF  Access; AV fistula   Acute on chronic hypoxic respiratory failure,   COPD  Heart failure,   Anemia s/p venofer loading  Secondary hyperparathyroidism   PLAN:   HD ongoing  C/w epogen  C/w renvela  Going to [facility] rehab today? She is going to get dialysis at the rehab while in [facility] rehab  She was switched to [employer] [facility] mon wed Friday 11 am           Current Facility-Administered Medications   Medication Dose Route Frequency    carboxymethylcellulose PF (THERATEARS/REFRESH) 1 % ophthalmic gel 1 drop  1 drop Both Eyes Daily    predniSONE (DELTASONE) tablet 10 mg  10 mg Oral Daily    insulin glargine (LANTUS) injection vial 20 Units  20 Units SubCUTAneous Nightly    insulin lispro (HUMALOG) injection vial 0-16 Units  0-16 Units SubCUTAneous TID WC    insulin lispro (HUMALOG) injection vial 0-4 Units  0-4 Units SubCUTAneous Nightly    traMADol (ULTRAM) tablet 50 mg  50 mg Oral Q12H PRN    metoprolol succinate (TOPROL XL) extended release tablet 25 mg  25 mg Oral BID    heparin (porcine) injection 5,000 Units  5,000 Units SubCUTAneous 3 times per day    amiodarone (CORDARONE) tablet 200 mg  200 mg Oral Daily    pantoprazole (PROTONIX) tablet 40 mg  40 mg Oral QAM AC    0.9 % sodium chloride infusion   IntraVENous PRN    polyethylene glycol (GLYCOLAX) packet 34 g  34 g Oral Daily    diatrizoate meglumine-sodium (GASTROGRAFIN) 66-10 % solution 30 mL  30 mL Oral 
      RENAL DAILY PROGRESS NOTE  73-year-old female with past medical history of ESRD, on anticoagulations admitted for short of breath, following for ESRD management.   Subjective:       Complaint:    Overnight events noted  Awake  Alert      IMPRESSION:   ESRD, MWF  Access; AV fistula   Acute on chronic hypoxic respiratory failure,   COPD  Heart failure,   Anemia    PLAN:   Seen during dialysis uf 3 liters tolerating well  Plan extra dialysis tomorrow  I and O  Getting ready for extubation.  She was switched to [facility] mon wed friday           Current Facility-Administered Medications   Medication Dose Route Frequency    0.9 % sodium chloride infusion   IntraVENous PRN    polyethylene glycol (GLYCOLAX) packet 34 g  34 g Oral Daily    methylPREDNISolone sodium succ (SOLU-MEDROL) 20 mg in sterile water 0.5 mL injection  20 mg IntraVENous Q12H    diatrizoate meglumine-sodium (GASTROGRAFIN) 66-10 % solution 30 mL  30 mL Oral ONCE PRN    midodrine (PROAMATINE) tablet 5 mg  5 mg Oral TID    0.9 % sodium chloride infusion   IntraVENous PRN    albumin human 25% IV solution 25 g  25 g IntraVENous PRN    amiodarone (NEXTERONE) 360 mg in dextrose 5% 200 ml  0.5 mg/min IntraVENous Continuous    midazolam PF (VERSED) injection 2 mg  2 mg IntraVENous Once    insulin lispro (HUMALOG) injection vial 0-8 Units  0-8 Units SubCUTAneous Q6H    nitroGLYCERIN (NITROSTAT) SL tablet 0.4 mg  0.4 mg SubLINGual Q5 Min PRN    Polyvinyl Alcohol-Povidone PF (REFRESH) 1.4-0.6 % ophthalmic solution 1 drop  1 drop Both Eyes Q4H    Or    lubrifresh P.M. (artificial tears) ophthalmic ointment   Both Eyes Q4H    pantoprazole (PROTONIX) 40 mg in sodium chloride (PF) 0.9 % 10 mL injection  40 mg IntraVENous Daily    ipratropium 0.5 mg-albuterol 2.5 mg (DUONEB) nebulizer solution 1 Dose  1 Dose Inhalation Q4H RT    piperacillin-tazobactam (ZOSYN) 3,375 mg in sodium chloride 0.9 % 50 mL IVPB (mini-bag)  3,375 mg IntraVENous Q12H    
      RENAL DAILY PROGRESS NOTE  73-year-old female with past medical history of ESRD, on anticoagulations admitted for short of breath, following for ESRD management.   Subjective:       Complaint:    Overnight events noted  Drop in H and H noted  Getting blood      IMPRESSION:   ESRD, MWF  Access; AV fistula   Acute on chronic hypoxic respiratory failure,   COPD  Heart failure,   Anemia    PLAN:   Rpt HD today  Goal UF today is 2-3 L  I and O  C/w blood transfusion  Wouldn't prefer midline in her  Discussed with ICU Team           Current Facility-Administered Medications   Medication Dose Route Frequency    0.9 % sodium chloride infusion   IntraVENous PRN    albumin human 25% IV solution 25 g  25 g IntraVENous PRN    amiodarone (NEXTERONE) 360 mg in dextrose 5% 200 ml  0.5 mg/min IntraVENous Continuous    midazolam PF (VERSED) injection 1 mg  1 mg IntraVENous Q15 Min PRN    midazolam PF (VERSED) injection 2 mg  2 mg IntraVENous Once    midodrine (PROAMATINE) tablet 10 mg  10 mg Oral TID    insulin lispro (HUMALOG) injection vial 0-8 Units  0-8 Units SubCUTAneous Q6H    norepinephrine (LEVOPHED) 16 mg in sodium chloride 0.9 % 250 mL infusion  1-100 mcg/min IntraVENous TITRATE    nitroGLYCERIN (NITROSTAT) SL tablet 0.4 mg  0.4 mg SubLINGual Q5 Min PRN    Polyvinyl Alcohol-Povidone PF (REFRESH) 1.4-0.6 % ophthalmic solution 1 drop  1 drop Both Eyes Q4H    Or    lubrifresh P.M. (artificial tears) ophthalmic ointment   Both Eyes Q4H    chlorhexidine (PERIDEX) 0.12 % solution 15 mL  15 mL Mouth/Throat BID    fentaNYL (SUBLIMAZE) infusion 1000 mcg/100mL   mcg/hr IntraVENous Continuous    fentaNYL (SUBLIMAZE) injection 50 mcg  50 mcg IntraVENous Q1H PRN    pantoprazole (PROTONIX) 40 mg in sodium chloride (PF) 0.9 % 10 mL injection  40 mg IntraVENous Daily    methylPREDNISolone sodium succ (SOLU-MEDROL) 40 mg in sterile water 1 mL injection  40 mg IntraVENous Daily    ipratropium 0.5 mg-albuterol 2.5 mg (DUONEB) 
      RENAL DAILY PROGRESS NOTE  73-year-old female with past medical history of ESRD, on anticoagulations admitted for short of breath, following for ESRD management.   Subjective:       Complaint:    Overnight events noted  Examined during dialysis   Blood pressure acceptable,         IMPRESSION:   ESRD, MWF  Access; AV fistula   Acute on chronic hypoxic respiratory failure,   COPD  Heart failure,   Anemia    PLAN:    Plan for HD today  with 2K bath UF goal 2-2.5L as tolerated  per her routine schedule.   continue epogen  Okay to discharge from renal stand point.            Current Facility-Administered Medications   Medication Dose Route Frequency    iron sucrose (VENOFER) injection 200 mg  200 mg IntraVENous Q24H    predniSONE (DELTASONE) tablet 40 mg  40 mg Oral Daily    epoetin agnes-epbx (RETACRIT) injection 6,000 Units  6,000 Units SubCUTAneous Once per day on Mon Wed Fri    benzonatate (TESSALON) capsule 100 mg  100 mg Oral TID    azithromycin (ZITHROMAX) 500 mg in sodium chloride 0.9% 250 mL (vial-mate/adapter) IVPB  500 mg IntraVENous Q24H    Virt-Caps 1 mg  1 capsule Oral Daily    heparin (porcine) injection 5,000 Units  5,000 Units SubCUTAneous 3 times per day    HYDROcodone-chlorpheniramine (TUSSIONEX) 10-8 MG/5ML oral suspension 5 mL  5 mL Oral Q12H PRN    [Held by provider] acetylcysteine (MUCOMYST) 10 % solution 400 mg  4 mL Inhalation Q6H WA RT    perflutren lipid microspheres (DEFINITY) injection 2 mL  2 mL IntraVENous ONCE PRN    ipratropium 0.5 mg-albuterol 2.5 mg (DUONEB) nebulizer solution 1 Dose  1 Dose Inhalation Q6H WA RT    phenol 1.4 % mouth spray 1 spray  1 spray Mouth/Throat Q2H PRN    oxyCODONE-acetaminophen (PERCOCET) 5-325 MG per tablet 1 tablet  1 tablet Oral Q4H PRN    naloxone (NARCAN) injection 0.4 mg  0.4 mg IntraVENous PRN    benzonatate (TESSALON) capsule 100 mg  100 mg Oral TID PRN    guaiFENesin (MUCINEX) extended release tablet 600 mg  600 mg Oral BID    arformoterol 
      RENAL DAILY PROGRESS NOTE  73-year-old female with past medical history of ESRD, on anticoagulations admitted for short of breath, following for ESRD management.   Subjective:       Complaint:    Overnight events noted  Examined during dialysis   Improving breathing but still breathing is not at baseline  Requesting another treatment.       IMPRESSION:   ESRD, MWF  Access; AV fistula   Acute on chronic hypoxic respiratory failure,   COPD  Heart failure,   Anemia    PLAN:    Plan for HD today with UF Goal 2L,   Will arrange additional HD tomorrow morning for volume management.   Discussed with Dr. Olivares           Current Facility-Administered Medications   Medication Dose Route Frequency    sodium zirconium cyclosilicate (LOKELMA) oral suspension 10 g  10 g Oral Once    methylPREDNISolone sodium succ (SOLU-MEDROL) 40 mg in sterile water 1 mL injection  40 mg IntraVENous Daily    HYDROcodone-chlorpheniramine (TUSSIONEX) 10-8 MG/5ML oral suspension 5 mL  5 mL Oral Q12H PRN    [Held by provider] acetylcysteine (MUCOMYST) 10 % solution 400 mg  4 mL Inhalation Q6H WA RT    perflutren lipid microspheres (DEFINITY) injection 2 mL  2 mL IntraVENous ONCE PRN    ipratropium 0.5 mg-albuterol 2.5 mg (DUONEB) nebulizer solution 1 Dose  1 Dose Inhalation Q6H WA RT    phenol 1.4 % mouth spray 1 spray  1 spray Mouth/Throat Q2H PRN    oxyCODONE-acetaminophen (PERCOCET) 5-325 MG per tablet 1 tablet  1 tablet Oral Q4H PRN    naloxone (NARCAN) injection 0.4 mg  0.4 mg IntraVENous PRN    benzonatate (TESSALON) capsule 100 mg  100 mg Oral TID PRN    guaiFENesin (MUCINEX) extended release tablet 600 mg  600 mg Oral BID    arformoterol tartrate (BROVANA) nebulizer solution 15 mcg  15 mcg Nebulization BID RT    budesonide (PULMICORT) nebulizer suspension 1 mg  1 mg Nebulization BID RT    allopurinol (ZYLOPRIM) tablet 100 mg  100 mg Oral Once per day on Mon Wed Fri    aspirin chewable tablet 81 mg  81 mg Oral Daily    atorvastatin 
      RENAL DAILY PROGRESS NOTE  73-year-old female with past medical history of ESRD, on anticoagulations admitted for short of breath, following for ESRD management.   Subjective:       Complaint:    Overnight events noted  Received extra dialysis yesterday, UF about 2.5L  Her volume status is improving,      IMPRESSION:   ESRD, MWF  Access; AV fistula   Acute on chronic hypoxic respiratory failure,   COPD  Heart failure,   Anemia    PLAN:    Plan for HD tomorrow per her routine schedule.   Okay to discharge from renal stand point.            Current Facility-Administered Medications   Medication Dose Route Frequency    Virt-Caps 1 mg  1 capsule Oral Daily    methylPREDNISolone sodium succ (SOLU-MEDROL) 40 mg in sterile water 1 mL injection  40 mg IntraVENous Daily    heparin (porcine) injection 5,000 Units  5,000 Units SubCUTAneous 3 times per day    HYDROcodone-chlorpheniramine (TUSSIONEX) 10-8 MG/5ML oral suspension 5 mL  5 mL Oral Q12H PRN    [Held by provider] acetylcysteine (MUCOMYST) 10 % solution 400 mg  4 mL Inhalation Q6H WA RT    perflutren lipid microspheres (DEFINITY) injection 2 mL  2 mL IntraVENous ONCE PRN    ipratropium 0.5 mg-albuterol 2.5 mg (DUONEB) nebulizer solution 1 Dose  1 Dose Inhalation Q6H WA RT    phenol 1.4 % mouth spray 1 spray  1 spray Mouth/Throat Q2H PRN    oxyCODONE-acetaminophen (PERCOCET) 5-325 MG per tablet 1 tablet  1 tablet Oral Q4H PRN    naloxone (NARCAN) injection 0.4 mg  0.4 mg IntraVENous PRN    benzonatate (TESSALON) capsule 100 mg  100 mg Oral TID PRN    guaiFENesin (MUCINEX) extended release tablet 600 mg  600 mg Oral BID    arformoterol tartrate (BROVANA) nebulizer solution 15 mcg  15 mcg Nebulization BID RT    budesonide (PULMICORT) nebulizer suspension 1 mg  1 mg Nebulization BID RT    allopurinol (ZYLOPRIM) tablet 100 mg  100 mg Oral Once per day on Mon Wed Fri    aspirin chewable tablet 81 mg  81 mg Oral Daily    atorvastatin (LIPITOR) tablet 80 mg  80 mg Oral 
    RENAL DAILY PROGRESS NOTE    Patient: Christy Donahue               Sex: female          DOA: 4/6/2024  8:57 AM        YOB: 1950      Age:  73 y.o.        LOS:  LOS: 1 day     Subjective:     Christy Donahue is a 73 y.o.  who presents with COPD exacerbation (HCC) [J44.1]  End stage renal disease on dialysis (HCC) [N18.6, Z99.2]  Acute respiratory failure with hypoxia (HCC) [J96.01].   Asked to evaluate for esrd,admitted with resp failure,hx of copd ,chf   Chief complains: Patient denies nausea, vomiting,c/o cp,sob  - Reviewed last 24 hrs events     Current Facility-Administered Medications   Medication Dose Route Frequency    oxyCODONE-acetaminophen (PERCOCET) 5-325 MG per tablet 1 tablet  1 tablet Oral Q4H PRN    naloxone (NARCAN) injection 0.4 mg  0.4 mg IntraVENous PRN    benzonatate (TESSALON) capsule 100 mg  100 mg Oral TID PRN    guaiFENesin (MUCINEX) extended release tablet 600 mg  600 mg Oral BID    morphine (PF) injection 2 mg  2 mg IntraVENous Once    doxycycline hyclate (VIBRAMYCIN) capsule 100 mg  100 mg Oral 2 times per day    arformoterol tartrate (BROVANA) nebulizer solution 15 mcg  15 mcg Nebulization BID RT    iopamidol (ISOVUE-370) 76 % injection 85 mL  85 mL IntraVENous ONCE PRN    budesonide (PULMICORT) nebulizer suspension 1 mg  1 mg Nebulization BID RT    [START ON 4/8/2024] allopurinol (ZYLOPRIM) tablet 100 mg  100 mg Oral Once per day on Mon Wed Fri    aspirin chewable tablet 81 mg  81 mg Oral Daily    atorvastatin (LIPITOR) tablet 80 mg  80 mg Oral Nightly    Vitamin D (CHOLECALCIFEROL) tablet 2,000 Units  2,000 Units Oral Daily    ezetimibe (ZETIA) tablet 10 mg  10 mg Oral Daily    folic acid (FOLVITE) tablet 1 mg  1 mg Oral Daily    gabapentin (NEURONTIN) capsule 300 mg  300 mg Oral QPM    ipratropium 0.5 mg-albuterol 2.5 mg (DUONEB) nebulizer solution 1 Dose  1 Dose Inhalation Q4H WA RT    glucose chewable tablet 16 g  4 tablet Oral PRN    dextrose bolus 10% 
    RENAL DAILY PROGRESS NOTE    Patient: Christy Donahue               Sex: female          DOA: 4/6/2024  8:57 AM        YOB: 1950      Age:  73 y.o.        LOS:  LOS: 2 days     Subjective:     Christy Donahue is a 73 y.o.  who presents with COPD exacerbation (HCC) [J44.1]  End stage renal disease on dialysis (HCC) [N18.6, Z99.2]  Acute respiratory failure with hypoxia (HCC) [J96.01].   Asked to evaluate for esrd,admitted with resp failure,hx of copd ,chf   Chief complains: Patient denies nausea, vomiting,c/o cp,sob  - Reviewed last 24 hrs events     Current Facility-Administered Medications   Medication Dose Route Frequency    perflutren lipid microspheres (DEFINITY) injection 2 mL  2 mL IntraVENous ONCE PRN    oxyCODONE-acetaminophen (PERCOCET) 5-325 MG per tablet 1 tablet  1 tablet Oral Q4H PRN    naloxone (NARCAN) injection 0.4 mg  0.4 mg IntraVENous PRN    benzonatate (TESSALON) capsule 100 mg  100 mg Oral TID PRN    guaiFENesin (MUCINEX) extended release tablet 600 mg  600 mg Oral BID    doxycycline hyclate (VIBRAMYCIN) capsule 100 mg  100 mg Oral 2 times per day    arformoterol tartrate (BROVANA) nebulizer solution 15 mcg  15 mcg Nebulization BID RT    budesonide (PULMICORT) nebulizer suspension 1 mg  1 mg Nebulization BID RT    allopurinol (ZYLOPRIM) tablet 100 mg  100 mg Oral Once per day on Mon Wed Fri    aspirin chewable tablet 81 mg  81 mg Oral Daily    atorvastatin (LIPITOR) tablet 80 mg  80 mg Oral Nightly    Vitamin D (CHOLECALCIFEROL) tablet 2,000 Units  2,000 Units Oral Daily    ezetimibe (ZETIA) tablet 10 mg  10 mg Oral Daily    folic acid (FOLVITE) tablet 1 mg  1 mg Oral Daily    gabapentin (NEURONTIN) capsule 300 mg  300 mg Oral QPM    ipratropium 0.5 mg-albuterol 2.5 mg (DUONEB) nebulizer solution 1 Dose  1 Dose Inhalation Q4H WA RT    glucose chewable tablet 16 g  4 tablet Oral PRN    dextrose bolus 10% 125 mL  125 mL IntraVENous PRN    Or    dextrose bolus 10% 250 
    RENAL DAILY PROGRESS NOTE    Patient: Christy Donahue               Sex: female          DOA: 4/6/2024  8:57 AM        YOB: 1950      Age:  73 y.o.        LOS:  LOS: 3 days     Subjective:     Christy Donahue is a 73 y.o.  who presents with COPD exacerbation ([facility]) [J44.1]  End stage renal disease on dialysis ([facility]) [N18.6, Z99.2]  Acute respiratory failure with hypoxia ([facility]) [J96.01].   Asked to evaluate for esrd,admitted with resp failure,hx of copd ,chf   Chief complains: Patient denies nausea, vomiting,c/o cp,sob  - Reviewed last 24 hrs events     Current Facility-Administered Medications   Medication Dose Route Frequency    perflutren lipid microspheres (DEFINITY) injection 2 mL  2 mL IntraVENous ONCE PRN    ipratropium 0.5 mg-albuterol 2.5 mg (DUONEB) nebulizer solution 1 Dose  1 Dose Inhalation Q6H WA RT    phenol 1.4 % mouth spray 1 spray  1 spray Mouth/Throat Q2H PRN    methylPREDNISolone sodium succ (SOLU-MEDROL) 40 mg in sterile water 1 mL injection  40 mg IntraVENous Q12H    oxyCODONE-acetaminophen (PERCOCET) 5-325 MG per tablet 1 tablet  1 tablet Oral Q4H PRN    naloxone (NARCAN) injection 0.4 mg  0.4 mg IntraVENous PRN    benzonatate (TESSALON) capsule 100 mg  100 mg Oral TID PRN    guaiFENesin (MUCINEX) extended release tablet 600 mg  600 mg Oral BID    doxycycline hyclate (VIBRAMYCIN) capsule 100 mg  100 mg Oral 2 times per day    arformoterol tartrate (BROVANA) nebulizer solution 15 mcg  15 mcg Nebulization BID RT    budesonide (PULMICORT) nebulizer suspension 1 mg  1 mg Nebulization BID RT    allopurinol (ZYLOPRIM) tablet 100 mg  100 mg Oral Once per day on Mon Wed Fri    aspirin chewable tablet 81 mg  81 mg Oral Daily    atorvastatin (LIPITOR) tablet 80 mg  80 mg Oral Nightly    Vitamin D (CHOLECALCIFEROL) tablet 2,000 Units  2,000 Units Oral Daily    ezetimibe (ZETIA) tablet 10 mg  10 mg Oral Daily    folic acid (FOLVITE) tablet 1 mg  1 mg Oral Daily    gabapentin 
    RENAL DAILY PROGRESS NOTE    Patient: Christy Donahue               Sex: female          DOA: 4/6/2024  8:57 AM        YOB: 1950      Age:  73 y.o.        LOS:  LOS: 4 days     Subjective:     Christy Donahue is a 73 y.o.  who presents with COPD exacerbation (HCC) [J44.1]  End stage renal disease on dialysis (HCC) [N18.6, Z99.2]  Acute respiratory failure with hypoxia (HCC) [J96.01].   Asked to evaluate for esrd,admitted with resp failure,hx of copd ,chf   Chief complains: Patient denies nausea, vomiting,c/o cp,sob  - Reviewed last 24 hrs events     Current Facility-Administered Medications   Medication Dose Route Frequency    acetylcysteine (MUCOMYST) 10 % solution 400 mg  4 mL Inhalation Q6H WA RT    perflutren lipid microspheres (DEFINITY) injection 2 mL  2 mL IntraVENous ONCE PRN    ipratropium 0.5 mg-albuterol 2.5 mg (DUONEB) nebulizer solution 1 Dose  1 Dose Inhalation Q6H WA RT    phenol 1.4 % mouth spray 1 spray  1 spray Mouth/Throat Q2H PRN    methylPREDNISolone sodium succ (SOLU-MEDROL) 40 mg in sterile water 1 mL injection  40 mg IntraVENous Q12H    oxyCODONE-acetaminophen (PERCOCET) 5-325 MG per tablet 1 tablet  1 tablet Oral Q4H PRN    naloxone (NARCAN) injection 0.4 mg  0.4 mg IntraVENous PRN    benzonatate (TESSALON) capsule 100 mg  100 mg Oral TID PRN    guaiFENesin (MUCINEX) extended release tablet 600 mg  600 mg Oral BID    doxycycline hyclate (VIBRAMYCIN) capsule 100 mg  100 mg Oral 2 times per day    arformoterol tartrate (BROVANA) nebulizer solution 15 mcg  15 mcg Nebulization BID RT    budesonide (PULMICORT) nebulizer suspension 1 mg  1 mg Nebulization BID RT    allopurinol (ZYLOPRIM) tablet 100 mg  100 mg Oral Once per day on Mon Wed Fri    aspirin chewable tablet 81 mg  81 mg Oral Daily    atorvastatin (LIPITOR) tablet 80 mg  80 mg Oral Nightly    Vitamin D (CHOLECALCIFEROL) tablet 2,000 Units  2,000 Units Oral Daily    ezetimibe (ZETIA) tablet 10 mg  10 mg Oral 
    RENAL DAILY PROGRESS NOTE    Patient: Christy Donahue               Sex: female          DOA: 4/6/2024  8:57 AM        YOB: 1950      Age:  73 y.o.        LOS:  LOS: 5 days     Subjective:     Christy Donahue is a 73 y.o.  who presents with COPD exacerbation (HCC) [J44.1]  End stage renal disease on dialysis (HCC) [N18.6, Z99.2]  Acute respiratory failure with hypoxia (HCC) [J96.01].   Asked to evaluate for esrd,admitted with resp failure,hx of copd ,chf   Chief complains: Patient denies nausea, vomiting,c/o cp,sob  - Reviewed last 24 hrs events     Current Facility-Administered Medications   Medication Dose Route Frequency    HYDROcodone-chlorpheniramine (TUSSIONEX) 10-8 MG/5ML oral suspension 5 mL  5 mL Oral BID    And    [START ON 4/12/2024] HYDROcodone-chlorpheniramine (TUSSIONEX) 10-8 MG/5ML oral suspension 5 mL  5 mL Oral Q12H PRN    acetylcysteine (MUCOMYST) 10 % solution 400 mg  4 mL Inhalation Q6H WA RT    perflutren lipid microspheres (DEFINITY) injection 2 mL  2 mL IntraVENous ONCE PRN    ipratropium 0.5 mg-albuterol 2.5 mg (DUONEB) nebulizer solution 1 Dose  1 Dose Inhalation Q6H WA RT    phenol 1.4 % mouth spray 1 spray  1 spray Mouth/Throat Q2H PRN    methylPREDNISolone sodium succ (SOLU-MEDROL) 40 mg in sterile water 1 mL injection  40 mg IntraVENous Q12H    oxyCODONE-acetaminophen (PERCOCET) 5-325 MG per tablet 1 tablet  1 tablet Oral Q4H PRN    naloxone (NARCAN) injection 0.4 mg  0.4 mg IntraVENous PRN    benzonatate (TESSALON) capsule 100 mg  100 mg Oral TID PRN    guaiFENesin (MUCINEX) extended release tablet 600 mg  600 mg Oral BID    arformoterol tartrate (BROVANA) nebulizer solution 15 mcg  15 mcg Nebulization BID RT    budesonide (PULMICORT) nebulizer suspension 1 mg  1 mg Nebulization BID RT    allopurinol (ZYLOPRIM) tablet 100 mg  100 mg Oral Once per day on Mon Wed Fri    aspirin chewable tablet 81 mg  81 mg Oral Daily    atorvastatin (LIPITOR) tablet 80 mg  80 
   04/11/24 2331   NIV Type   Suction Setup and Functional Yes   NIV Started/Stopped On   Equipment Type V60   Mode Bilevel   Mask Type Full face mask   Mask Size Medium   Bonnet size Medium   Assessment   Pulse 78   Respirations 19   SpO2 96 %   Level of Consciousness 0   Comfort Level Good   Using Accessory Muscles No   Mask Compliance Good   Settings/Measurements   PIP Observed 19 cm H20   IPAP 16 cmH20   CPAP/EPAP 6 cmH2O   Vt (Measured) 469 mL   Rate Ordered 8   FiO2  35 %   Minute Volume (L/min) 7.4 Liters   Mask Leak (lpm) 32 lpm   Patient's Home Machine No   Alarm Settings   Low Pressure (cmH2O) 5 cmH2O   High Pressure (cmH2O) 45 cmH2O   Apnea (secs) 20 secs   RR Low (bpm) 8   RR High (bpm) 50 br/min       
   04/16/24 9502   NIV Type   Mode Bilevel   Mask Type Full face mask   Mask Size Medium   Assessment   Pulse 88   Respirations 14   SpO2 98 %   Breath Sounds   Breath Sounds Bilateral Diminished   Settings/Measurements   PIP Observed 20 cm H20   IPAP 20 cmH20   CPAP/EPAP 8 cmH2O   Vt (Measured) 634 mL   Rate Ordered 8   Insp Rise Time (%) 3 %   FiO2  35 %   I Time/ I Time % 0.9 s   Minute Volume (L/min) 8.5 Liters   Mask Leak (lpm) 1 lpm     Patient placed on her BiPAP at this time per her request.  
   04/18/24 0035   NIV Type   Equipment Type v60   Mode Bilevel   Mask Type Full face mask   Mask Size Medium   Assessment   Pulse 88   Respirations 16   SpO2 98 %   Breath Sounds   Breath Sounds Bilateral Diminished   Settings/Measurements   PIP Observed 16 cm H20   IPAP 20 cmH20   CPAP/EPAP 8 cmH2O   Vt (Measured) 665 mL   Rate Ordered 8   Insp Rise Time (%) 3 %   FiO2  35 %   I Time/ I Time % 0.9 s   Minute Volume (L/min) 10.7 Liters   Mask Leak (lpm) 17 lpm     Patient on BiPAP per her request at this time.  
  [facility]  Rapid/Medical Response Team Note    Patient:    Christy Donahue 73 y.o. female, : 1950  Patient MRN: [id number]    Admission Date: 2024   Admission Diagnosis: COPD exacerbation (HCC) [J44.1]  End stage renal disease on dialysis (HCC) [N18.6, Z99.2]  Acute respiratory failure with hypoxia (HCC) [J96.01]    RAPID RESPONSE  Rapid response called for: Chest Pain     Patient with notes of chest pain earlier this AM. Per nursing patient described the pain as \"someone sitting on my chest.\" Patient describes right sided chest pain with no radiation. Denies any exacerbating or alleviating factors. Endorses some shortness of breath, uses supplemental O2 prn at baseline. Patient percocet 5-325 mg an hour earlier without much noted improvement in her symptoms.     OBJECTIVE  RRT/MRT start time:        0757       RRT/MRT end time:  0810  Vitals:    24 0740   BP: 135/75   Pulse: 92   Resp: 19   Temp:    SpO2: 98%      Physical Exam:  Gen: NAD, comfortable, obese   HEENT: normocephalic, atraumatic  CV: RRR, S1/S2 present without M/R/G  Pulm: Diffuse expiratory wheezing bilaterally   Skin: warm, dry, intact, no rash  Neuro: CN II-XII grossly intact, no focal deficits appreciated     ASSESSMENT/PLAN AND DISPOSITION  Christy Donahue is a 73 y.o. year old female admitted for COPD exacerbation (HCC) [J44.1]  End stage renal disease on dialysis (HCC) [N18.6, Z99.2]  Acute respiratory failure with hypoxia (HCC) [J96.01]  Rapid Response Team/ Medical Response Team Called For:  Chest Pain     Medications Administered During Rapid: None     EKG: Normal sinus rhythm, no ST changes, unchanged when compared to prior    Labs: STAT troponin    Imaging: None    Chest Pain   Concern for pulmonary embolism given patient with history of DVT not on anticoagulation. Chest pain with shortness of breath. Was hypotensive overnight received Midodrine and Albumin. Presently hemodynamically stable and on heparin gtt.  
 attended the interdisciplinary rounds for Christy Donahue, who is a 73 y.o.,female. Patient's Primary Language is: English.   According to the patient's EMR [employer] Affiliation is: [employer].     The reason the Patient came to the hospital is:   Patient Active Problem List    Diagnosis Date Noted    COPD exacerbation ([facility]) 04/11/2024    Acquired arteriovenous aneurysm ([facility]) 04/06/2024    Dialysis patient ([facility]) 04/06/2024    Smoking greater than 20 pack years 04/06/2024    Ulnar neuropathy at elbow of left upper extremity 04/06/2024    End stage renal disease on dialysis ([facility]) 04/06/2024    Acute respiratory failure with hypoxia ([facility]) 04/06/2024    Chronic hypoxic respiratory failure ([facility]) 03/02/2024    Chronic anemia 03/02/2024    Left foot pain 01/31/2024    CHF (congestive heart failure), NYHA class I, acute on chronic, combined ([facility]) 01/29/2024    Cardiomyopathy ([facility]) 01/29/2024    Anemia, macrocytic 12/23/2023    Secondary hyperparathyroidism of renal origin ([facility]) 10/24/2023    Vitamin D deficiency 10/24/2023    Mild protein-calorie malnutrition ([facility]) 08/07/2023    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/2023    Personal history of tobacco use, presenting hazards to health 08/06/2023    Moderate COPD (chronic obstructive pulmonary disease) ([facility]) 08/06/2023    MICHAEL (obstructive sleep apnea) 08/06/2023    Colostomy in place ([facility]) 07/04/2023    Perforated diverticulum of large intestine 07/04/2023    Abdominal adhesions 07/04/2023    S/P exploratory laparotomy 06/30/2023    Chronic heart failure with preserved ejection fraction ([facility]) 01/13/2023    Anemia due to chronic kidney disease, on chronic dialysis ([facility]) 01/13/2023    Stenosis of both vertebral arteries 12/09/2022    CVA (cerebral vascular accident) ([facility]) 12/08/2022    End-stage renal disease on hemodialysis ([facility]) 12/08/2022    CHF (congestive heart failure) ([facility]) 09/09/2022    Pulmonary hypertension ([facility]) 07/08/2022    History of myocardial infarction 
 attended the interdisciplinary rounds for Christy Donahue, who is a 73 y.o.,female. Patient's Primary Language is: English.   According to the patient's EMR [employer] Affiliation is: [employer].     The reason the Patient came to the hospital is:   Patient Active Problem List    Diagnosis Date Noted    COPD exacerbation ([facility]) 04/11/2024    Acquired arteriovenous aneurysm ([facility]) 04/06/2024    Dialysis patient ([facility]) 04/06/2024    Smoking greater than 20 pack years 04/06/2024    Ulnar neuropathy at elbow of left upper extremity 04/06/2024    End stage renal disease ([facility]) 04/06/2024    Acute respiratory failure with hypoxia ([facility]) 04/06/2024    Chronic hypoxic respiratory failure ([facility]) 03/02/2024    Chronic anemia 03/02/2024    Left foot pain 01/31/2024    CHF (congestive heart failure), NYHA class I, acute on chronic, combined ([facility]) 01/29/2024    Cardiomyopathy ([facility]) 01/29/2024    Anemia, macrocytic 12/23/2023    Secondary hyperparathyroidism of renal origin ([facility]) 10/24/2023    Vitamin D deficiency 10/24/2023    Mild protein-calorie malnutrition ([facility]) 08/07/2023    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    Personal history of tobacco use, presenting hazards to health 08/06/2023    Moderate COPD (chronic obstructive pulmonary disease) ([facility]) 08/06/2023    MICHAEL (obstructive sleep apnea) 08/06/2023    Colostomy in place ([facility]) 07/04/2023    Perforated diverticulum of large intestine 07/04/2023    Abdominal adhesions 07/04/2023    S/P exploratory laparotomy 06/30/2023    Chronic heart failure with preserved ejection fraction ([facility]) 01/13/2023    Anemia due to chronic kidney disease, on chronic dialysis ([facility]) 01/13/2023    Stenosis of both vertebral arteries 12/09/2022    CVA (cerebral vascular accident) ([facility]) 12/08/2022    End-stage renal disease on hemodialysis ([facility]) 12/08/2022    CHF (congestive heart failure) ([facility]) 09/09/2022    Pulmonary hypertension ([facility]) 07/08/2022    History of myocardial infarction 06/27/2022    
 responded to Rapid Response for  Christy Donahue, who is a 73 y.o.,female,     The  provided the following Interventions:  Provided crisis spiritual care and support.   Offered prayers on behalf for the patient.   Chart reviewed.    Plan:  Chaplains will continue to follow and will provide spiritual care as needed.   recommends bedside caregivers page  on duty if patient or family shows signs of acute spiritual or emotional distress.     Chaplain Mary Marks  Spiritual Care   [phone]   
0700: Bedside and Verbal shift change report given to LISETTE Hartley (oncoming nurse) by LISETTE Zavala (offgoing nurse). Report included the following information Nurse Handoff Report, Index, Adult Overview, Intake/Output, and MAR.     0834: Dialysis called for report on pt.     0915: Pt off unit to dialysis via bed.     1245: Dialysis called to report on pt coming back to unit. Pt had 3L removed over 3 hours. V/S: /77 T 97.2 HR 84.     1345: Pt returned from dialysis via bed.     1400: Vitals obtained.     1430: Incontinent care performed. Pt bathed, linen and gown changed.     1553: Pt's  at bedside.     1745: Telephone order received for Tramadol 50mg PO once. RBV.     1804: Pt medicated for right lower back pain per MAR.     1910: Bedside and Verbal shift change report given to LISETTE Figueroa (oncoming nurse) by LISETTE Hartley (offgoing nurse). Report included the following information Nurse Handoff Report, Index, Adult Overview, Intake/Output, and MAR.    
0700: Bedside and Verbal shift change report given to LISETTE Hartley (oncoming nurse) by LISETTE Zavala (offgoing nurse). Report included the following information Nurse Handoff Report, Index, Adult Overview, Intake/Output, and MAR.     Morning assessment completed. Pt with no complaints of pain or discomfort at this time.    0915: Incontinent care performed. Pt repositioned.    1230: Incontinent care performed. Pt repositioned.     1245: Dialysis called for report on pt.     1300: Pt off unit to dialysis via bed.     1645: Dialysis called to give report on pt. Pt had 3L removed.     1700: Pt returned to unit via bed. Vitals obtained. Incontinent care performed. Pt repositioned.     1905: Bedside and Verbal shift change report given to LISETTE Srivastava (oncoming nurse) by LISETTE Hartley (offgoing nurse). Report included the following information Nurse Handoff Report, Index, Adult Overview, Intake/Output, and MAR.      1930: Pt's eye drops given. Pt's  at bedside. Oncoming RN, Carola, notified. Care relinquished.   
0700: Bedside and Verbal shift change report given to LISETTE Lyons (oncoming nurse) by LISETTE Cisneros (offgoing nurse). Report included the following information Nurse Handoff Report, Index, Adult Overview, Intake/Output, MAR, Recent Results, and Cardiac Rhythm NSR-ST .      Morning medications and assessment completed. Pt with no complaints of pain or discomfort at this time. Pt sitting at EOB to consume breakfast.     0930: Pt assisted to BSC and back to bed.     1300: Pt's , Lance, at bedside.     1545: Pt assisted to bathroom and back to bed. Pt with complaints of chest pain 10/10.     1548: RRT called d/t chest pain and new onset of wheezing.     1552: EKG performed. Vitals /88  O2 100% Temp: 99.2 B.     1554: EKG performed, again.     1556: EKG performed, again.    1558: EKG performed, again.    1608: Phlebotomy at bedside. EKG performed, again.     1609: Pads placed on pt's chest for telemetry monitoring.      1606: Nitro given. BP:160/96     1610: Pt with complaints of feeling hot and nausea. Pt inquiring for a fan. Pt appears diaphoretic, clammy, and leaning to her right side.      1614: Metoprolol 5mg IV given.     1616: /126     1620: 20g PIV right hand placed. /96 HR 90    1621: Pt inquiring about Lance.  called and states, \"I am on my way\".    1623: EKG performed, again. O2: 86%    1624: Lasix 80mg IV given.    1625: O2: 81%    1626: /78 HR 98 O2 71%. Pt placed on NRB mask.    1628:     1629: /90     1631:Xray at bedside.    1633: Chest Xray perfomed.     1634: /88     1635: Dr. Borrego at bedside.    1638: /94      1641: /94     1645: Metoprolol 5mg IV given.     1655: Zofran 4mg IV given.     1658: RT at bedside attempting to place BiPaP mask on pt. Pt repeatedly belching with the smell of vomitus on breath. This writer inquired about suction set up.     1700: Pulse 
0705: Bedside and Verbal shift change report given to LISETTE Frank (oncoming nurse) by LISETTE Murrieta (offgoing nurse). Report included the following information Nurse Handoff Report, Adult Overview, Intake/Output, MAR, Recent Results, and Cardiac Rhythm NSR/Sinus Tach .     0725: Bedside and Verbal shift change report given to LISETTE Martin (oncoming nurse) by LISETTE Frank (offgoing nurse). Report included the following information Nurse Handoff Report, Adult Overview, Intake/Output, MAR, Recent Results, and Cardiac Rhythm NSR/Sinus Tach.   
0710: Bedside and Verbal shift change report given to Tanika/LISETTE Montejo (oncoming nurse) by Veronica/LISETTE Zapata (offgoing nurse). Report included the following information Nurse Handoff Report, Adult Overview, Intake/Output, MAR, Recent Results, and Cardiac Rhythm NSR .     1915: Bedside and Verbal shift change report given to LISETTE Jaimes (oncoming nurse) by LISETTE Sagastume (offgoing nurse). Report included the following information Nurse Handoff Report, Adult Overview, Intake/Output, MAR, Recent Results, and Cardiac Rhythm NSR.   
0714: Bedside and Verbal shift change report given to LISETTE Kwon (oncoming nurse) by LISETTE Figueroa (offgoing nurse). Report included the following information Nurse Handoff Report, Adult Overview, Intake/Output, MAR, Recent Results, and Cardiac Rhythm NSR .     Morning assesment and medications completed. No s/s of distress.     1920: Bedside and Verbal shift change report given to LISETTE Murrieta/LISETTE Cameron (oncoming nurse) by LISETTE Kwon (offgoing nurse). Report included the following information Nurse Handoff Report, Adult Overview, Intake/Output, MAR, Recent Results, and Cardiac Rhythm NSR .     
0715: Bedside and Verbal shift change report given to LISETTE Sagastume (oncoming nurse) by LISETTE Jaimes (offgoing nurse). Report included the following information Nurse Handoff Report, Adult Overview, Intake/Output, MAR, Recent Results, and Cardiac Rhythm NSR .     Bedside and Verbal shift change report given to LISETTE Jaimes (oncoming nurse) by LISETTE Sagastume (offgoing nurse). Report included the following information Nurse Handoff Report, Adult Overview, Intake/Output, MAR, Recent Results, and Cardiac Rhythm NSR.     
0720: Bedside and Verbal shift change report given to LISETTE Kwon (oncoming nurse) by LISETTE Murrieta (offgoing nurse). Report included the following information Nurse Handoff Report, Adult Overview, Intake/Output, MAR, Recent Results, and Cardiac Rhythm NSR .     Patient is in bed receiving breathing tx. No s/s of distress.     1240: Report given to dialysis RN.     1242: Telephone order received for patient to go off unit w/o tele. RBV.     1443: Patient off unit for dialysis.     1841: Received report from dialysis.     1845: Patient arrived on the unit. VSS.    1930: Bedside and Verbal shift change report given to LISETTE Murrieta (oncoming nurse) by LISETTE Kwon (offgoing nurse). Report included the following information Nurse Handoff Report, Adult Overview, Intake/Output, MAR, Recent Results, and Cardiac Rhythm NSR .     
0730: Bedside and Verbal shift change report given to LISETTE Brush (oncoming nurse) by LISETTE Zavala (offgoing nurse). Report included the following information Nurse Handoff Report, Adult Overview, Intake/Output, Recent Results, Cardiac Rhythm SR, Neuro Assessment, and Event Log.      0830: Pt is bed, alert and awake eating breakfast. Pt currently denies any pain and request a cup of ice.   
0755: Bedside shift change report given to LISETTE Jansen, Pranav Umanzor (oncoming nurse) by Selina (offgoing nurse). Report included the following information Nurse Handoff Report, Intake/Output, MAR, and Cardiac Rhythm ST/NS .      0758: RRT called during report. Pt has complained of chest pain substernal radiating to the left side. Selina RN had previously paged MD this morning and obtained EKG, started heparin drip for rule out PE, and placed order for stat troponin. New EKG obtained at RRT and MD was consulted. RT administered breathing treatment.     0810: RRT ended.     1222: MD paged. Pt is having a cough and requesting a cough medication. Awaiting call back.     1312: MD rounding on pt. MD notified of pts request for cough medication. RN received verbal orders with RB verification for tesslon pearls 100 mg TID PRN and Mucinex 600 mg BID. RN to place orders and acknowledge.     1423: CT called and heparin verified in the 22g IV. 20g IV flushed and patent.     1545: pt arrived back on unit from CT    1920: Bedside shift change report given to LISETTE Marks (oncoming nurse) by LISETTE Jansen (offgoing nurse). Report included the following information Nurse Handoff Report, Intake/Output, MAR, and Cardiac Rhythm ST .    
0803: Asked pt if she wanted to be turned and bathed pt refused. Also asked pt if she wanted to try using some mouthwash and if I could wash her face, pt refused     0820: placed wedges underneath pt.  
0803: Asked pt if she wanted to be turned and bathed pt refused. Also asked pt if she wanted to try using some mouthwash and if I could wash her face, pt refused.  
0915: Client transported off the floor to dialysis   
1007: Attempted PT treatment. Agreeable to PT. Patient tearful and anxious over discharge plan. Offered therapeutic listening. Transport arrival to room for patient to HD. Will follow up as able.   1240: 2nd PT attempt. Remains off floor at HD. Will follow up.   
1139 - The patient refused her nebulizer treatment. She was adamant she was not going to take it. She told me to \"get out and do not come back\".   
1200: Client is transferred to receive scheduled HD  
1315: Received report from LISETTE Tolentino in dialysis, Pt planned for 3 hours, only 2 hours completed with 1 L removed. Vitals stable at this time.  Primary RN made aware. Pt to return back on floor.  
1620: Client received from dialysis. No s/s of bleeding or inflammation at  site.   
1740: MD returned page regarding pt's . MD informed this RN to cover pt's Bg according to SSI, and recheck BG in 2 hours and notify if not WNL.   
1905: Bedside and Verbal shift change report given to Jodi/LISETTE Murrieta (oncoming nurse) by LISETTE Hartley (offgoing nurse). Report included the following information Nurse Handoff Report, Index, Adult Overview, Intake/Output, and MAR.     1930: Assessment completed, Pt has no s/s of distress at this time.    2000: Incontinent care preformed, pt decided to rest.    2345: Pt refused midnight vitals, RN to try again later.    Pt refused her eye her drops stating \"am fine, I don't need it    0200: RN rounding pt in bed, quietly.    0400: Vitals completed. Pt takes off her O2 device (nasal cannula). RN redirects and educates pt on the importance of having her O2 on, Pt refuse and yells stating \" Leave me alone, I don't need it\".     0600: Pt refusing all morning meds, and states \"Leave me alone\"    0710: Bedside and Verbal shift change report given to LISETTE Avitia (oncoming nurse) by LISETTE Murrieta/ LISETTE Zapata (offgoing nurse). Report included the following information Nurse Handoff Report, Index, Adult Overview, Intake/Output, and MAR.       
1905: Bedside and Verbal shift change report given to LISETTE Zapata/Fiona RN(oncoming nurse) by LISETTE Avitia (offgoing nurse). Report included the following information Nurse Handoff Report, Index, Adult Overview, Intake/Output, and MAR.     1925: Assessment completed. Pt have no s/s of distress at this time    2230: RN rounding, patient in bed, room quiet, Pt's eyes closed but easily aroused.    0400: Pt refused her second eye drop meds stating \"Noo, just leave me alone.    Pt also refused RN from measuring her temp stating \" Just leave me alone\". RN to try again later.    0710: Bedside and Verbal shift change report given to Concepcion Clifton RN (oncoming nurse) by Fiona & Jodi RN (offgoing nurse). Report included the following information Nurse Handoff Report, Index, Intake/Output, MAR, Recent Results, Neuro Assessment, and Event Log.                    
1910: Bedside and Verbal shift change report given to LISETTE Jaimes and LISETTE Zapata (oncoming nurse) by LISETTE Lombardo (offgoing nurse). Report included the following information Nurse Handoff Report, Adult Overview, Intake/Output, Recent Results, Cardiac Rhythm NS, Quality Measures, and Neuro Assessment.      2000: Assessment completed, Pt complained of severe pain on left upper arm, around her fistula site and started tearing up.    RN administered PRN pain meds and place a pillow under left arm for some comfort.    2250: RN rounding, Pt C/O of pain on  left arm and lower abdomen, RN assessed and place some ice on upper arm to relieve some pain.    0710: Bedside and Verbal shift change report given to LISETTE Hartley and LISETTE London (oncoming nurse) by LISETTE Jaimes and LISETTE Zapata(offgoing nurse). Report included the following information Nurse Handoff Report, Adult Overview, Intake/Output, Recent Results, Cardiac Rhythm NS, Quality Measures, and Neuro Assessment.                 
1910hrs:  Bedside and Verbal shift change report given to LISETTE Jaimes  (oncoming nurse) by LISETTE Sotelo (offgoing nurse). Report included the following information Nurse Handoff Report, Index, ED Encounter Summary, Adult Overview, Surgery Report, Intake/Output, MAR, Recent Results, Cardiac Rhythm NSR, Neuro Assessment, and Event Log.     Pt in bed, resting with her eyes closed.  Pt c/o pain and requesting pain meds if able to receive.  No s/s distress noted.     1959hrs:  Pt c/o 8 out of 10 sharp back and chest (d/t coughing) pain.  PRN  PO Percocet and PO Tessalon Capsule admin'd per MD order.      2001hrs:  Pt's /57; pt's scheduled PO Midodrine held.  MAR updated accordingly.    0730hrs:  Bedside and Verbal shift change report given to LISETTE Javier & LISETTE Clifton (oncoming nurse) by LISETTE Jaimes (offgoing nurse). Report included the following information Nurse Handoff Report, Index, ED SBAR, Adult Overview, Surgery Report, Intake/Output, MAR, Recent Results, Cardiac Rhythm NSR, Neuro Assessment, and Event Log.   
1925:Bedside and Verbal shift change report given to LISETTE Zapata/ LISETTE Maritn(oncoming nurse) by LISETTE Sagastume/ LISETTE Montejo (offgoing nurse). Report included the following information Nurse Handoff Report, Adult Overview, Intake/Output, MAR, Recent Results, Cardiac Rhythm SR, Neuro Assessment, and Event Log.      1945: Assessment completed, Pt showed no s/s and symptoms of distress at this time.    2105: Pt requested for PRN cough meds, she complained of pain on her back, PRN meds administered  by RN.    2140: Pt is calm and resting at this time.    0715 :Bedside and Verbal shift change report given to LISETTE Sagastume/ LISETTE Montejo (oncoming nurse) by LISETTE Martin/ LISETTE Zapata(offgoing nurse). Report included the following information Nurse Handoff Report, Adult Overview, Intake/Output, MAR, Recent Results, Cardiac Rhythm SR, Neuro Assessment, and Event Log.        
1930hrs:  Bedside and Verbal shift change report given to LISETTE Jaimes & Jodi RN (oncoming nurse) by LISETTE Little (offgoing nurse). Report included the following information Nurse Handoff Report, Index, ED Encounter Summary, Adult Overview, Surgery Report, Intake/Output, MAR, Recent Results, Cardiac Rhythm NSR, Neuro Assessment, and Event Log.     Pt in bed, resting quietly with her eyes closed.  No s/s distress noted.     2147hrs:  Pt c/o 10 out of 10 burning pain to left hand.  PRN PO Percocet admin'd per MD order.     Pt also provided with ice pack for LUE swelling d/t concerns with AV Fistula.      0415hrs:  Pt assisted to BSC.  Pt was bathed with bath wipes and provided with clean gown, linen, and bed pad.      0436hrs:  Pt c/o 10 out of 10 aching, nagging back and ABD pain.  PRN PO Percocet admin'd per MD order.      0730hrs:  Bedside and Verbal shift change report given to LISETTE London & LISETTE Hartley (oncoming nurse) by LISETTE Jaimes & LISETTE Zapata (offgoing nurse). Report included the following information Nurse Handoff Report, Index, ED Encounter Summary, Adult Overview, Surgery Report, Intake/Output, MAR, Recent Results, Cardiac Rhythm NSR, Neuro Assessment, and Event Log.   
1930hrs:  Bedside and Verbal shift change report given to LISETTE Jaimes & LISETTE Zapata (oncoming nurse) by LISETTE Sotelo & LISETTE Stokes (offgoing nurse). Report included the following information Nurse Handoff Report, Index, ED Encounter Summary, Adult Overview, Surgery Report, Intake/Output, MAR, Recent Results, Cardiac Rhythm NSR, Neuro Assessment, and Event Log.     Pt in bed, resting quietly.  No s/s distress noted.     0600hrs:  Pt called Nursing Station to request to take off BiPAP.   RN contacted RT for assistance.      RT advised pt to be taken off BiPAP per pt's request.     0624hrs:  Pt c/o 10 out 10 sharp back pain.  PRN PO Percocet admin'd per MD order.      0641hrs:  Pt c/o constipation, \"I haven't had a bowel movement in a week.\"  MD paged.      0642hrs:  Spoke with Dr. Interiano regarding pt concerns.  Orders to be added to pt's chart.      0700hrs:  Sputum specimen obtained and taken to lab for processing.     0730hrs:  Bedside and Verbal shift change report given to LISETTE Sotelo (oncoming nurse) by LISETTE Jaimes & LISETTE Zapata (offgoing nurse). Report included the following information Nurse Handoff Report, Index, ED Encounter Summary, Surgery Report, Intake/Output, MAR, Recent Results, Cardiac Rhythm NSR, Neuro Assessment, and Event Log.   
1930hrs:  Bedside and Verbal shift change report given to LISETTE Jaimes (oncoming nurse) by LISETTE Sagastume (offgoing nurse). Report included the following information Nurse Handoff Report, Index, ED Encounter Summary, Adult Overview, Surgery Report, Intake/Output, MAR, Recent Results, Cardiac Rhythm NSR, Neuro Assessment, and Event Log.     Pt in bed, resting quietly and watching TV.  No s/s distress noted.     Per LISETTE Sagastume, pt loss IV access and multiple attempts were made to admin.  ER was contacted, but no one available.  Nursing Supervisor or ICU to be consulted.    1950hrs:  LISETTE Jaimes called Nursing Supervisor to request IV assistance.  Nursing Supervisor advised that assistance to be provided following shift change.      2148hrs:  Pt c/o 10 out 10 pain to back, chest (sputum) and Right Arm. PRN PO Percocet admin'd per MD order. Pt also requesting additional cough med. RN advised pt that she could receive within an hour timeframe.     2355hrs:  Per Nursing Supervisor, IV admin'd completed.  22gauge Right Forearm.     0005hrs:  PRN PO Tussionex admin'd per MD order.     0635hrs:  Pt c/o 10 out 10 pain to back, chest (sputum) and Right Arm. PRN PO Percocet admin'd per MD order.    0730hrs:  Bedside and Verbal shift change report given to LISETTE Brewer (oncoming nurse) by LISETTE Jaimes (offgoing nurse). Report included the following information Nurse Handoff Report, Index, ED Encounter Summary, Adult Overview, Surgery Report, Intake/Output, MAR, Recent Results, Cardiac Rhythm NSR, Neuro Assessment, and Event Log.   
1930hrs:  Bedside and Verbal shift change report given to LISETTE Jaimes (oncoming nurse) by LISETTE Sagastume (offgoing nurse). Report included the following information Nurse Handoff Report, Index, ED Encounter Summary, Adult Overview, Surgery Report, Intake/Output, MAR, Recent Results, Cardiac Rhythm NSR/ST, Neuro Assessment, and Event Log.     Pt in bed, c/o constipation and pain.  LISETTE Sagastume advised pt that she received Rectal Dulcolax Suppository at 1818hrs.  Pt stated that she still needed help for BM.      1950hrs:  Paged and spoke with Dr. Skelton regarding pt's c/o constipation.  RN and MD discussed pt's receipt of rectal suppository.  RN requested for soap radha edema d/t pt's prior h/o receiving edema and it being successful.  Per MD, pt's MAR to be updated.     2015hrs:  Per LISETTE Marks pt with medium, formed/soft BM.  Soap radha edema was not admin'd.      2047hrs:  Pt c/o 10 out 10 pain to back, chest (sputum) and rib cage.  PRN PO Percocet admin'd per MD order.  Pt also requesting additional cough med.  RN advised pt that she could receive within an hour timeframe.     2147hrs:  PRN PO Tussionex admin'd per MD order.      0530hrs:  Per LISETTE Marks, pt was found standing out of bed and attempting to go to the bathroom.  Pt stated that she didn't know where she was and needed to urinate.  LISETTE Marks re-directed pt back to bed.      0637hrs:  Pt c/o 10 out 10 pain to back, chest (sputum) and rib cage.  PRN PO Percocet admin'd per MD order.  Pt also requesting additional cough med.  RN advised pt that she could receive within an hour timeframe.     0730hrs:  Bedside and Verbal shift change report given to LISETTE Sagastume (oncoming nurse) by LISETTE Jaimes (offgoing nurse). Report included the following information Nurse Handoff Report, Index, ED Encounter Summary, Adult Overview, Surgery Report, Intake/Output, MAR, Recent Results, Cardiac Rhythm ST/NSR, Neuro Assessment, and Event Log.   
1930hrs:  Bedside shift change report given to LISETTE Jaimes & LISETTE Zapata (oncoming nurse) by LISETTE Avitia (offgoing nurse). Report included the following information Nurse Handoff Report, Index, ED Encounter Summary, Adult Overview, Surgery Report, Intake/Output, MAR, Recent Results, Cardiac Rhythm AFIB, Neuro Assessment, and Event Log.     Pt in bed, resting with her eyes closed.  Pt is easily aroused.  No s/s distress noted.     2032hrs:  Contacted by Henrietta from lab.  Pt with ordered Cortisol Test for salivary specimen.  Per Henrietta, there are no lab kits available until Monday.  She wants to know if the MD wants to do blood specimen or wait until Monday.      2040hrs:  RN called and spoke with Dr. Olivares regarding conversation with lab.  MD advised RN to switch Cortisol test for blood specimen.      RN called Henrietta with modified MD order.      0400hrs:  Pt refused her scheduled eye drops and temperature.  Pt stated, \"Just, leave me alone.\"    0730hrs:  Bedside and Verbal shift change report given to LISETTE Javier & LISETTE Clifton (oncoming nurse) by LISETTE Jaimes (offgoing nurse). Report included the following information Nurse Handoff Report, Index, ED Encounter Summary, Adult Overview, Surgery Report, Intake/Output, MAR, Recent Results, Cardiac Rhythm NSR, Neuro Assessment, and Event Log.   
1934: Bedside and Verbal shift change report given to LISETTE Zapata / LISETTE Jaimes (oncoming nurse) by LISETTE Stokes / LISETTE Sotelo (offgoing nurse). Report included the following information Nurse Handoff Report, Index, Adult Overview, Intake/Output, MAR, Cardiac Rhythm NSR, Neuro Assessment, and Event Log.      2205: RN on rounding, Pt is calm and steady on the bed. No s/s symptoms of distress at this time.    2350: RT administered Pt BIPAP, Pt is resting in bed, Vitals taken, Pt shows no signs of distress.    0530: Pt is off her BIPAP, Pt says she feels like having a BM and complains of constipation, RN assist Pt to commode and suggested to administer PRN meds for constipation, Pt refused and requested for suppository medications.    0641: MD paged regarding request, orders to be placed in Pt's chart.    0715: Bedside and Verbal shift change report given to LISETTE Sotelo (oncoming nurse) by LISETTE Jaimes and LISETTE Zapata (offgoing nurse). Report included the following information Nurse Handoff Report, Index, Adult Overview, Intake/Output, MAR, Cardiac Rhythm NSR, Neuro Assessment, and Event Log.            
1945: Bedside and Verbal shift change report given to Veronica/Jodi RN (oncoming nurse) by Radha RN (offgoing nurse). Report included the following information Nurse Handoff Report, Adult Overview, and Cardiac Rhythm SR .     2015: Assessment completed, Wheezes and crackles noted on Pt's lower and upper lobes,  Continue monitoring on Pt, and fluid restrictions followed.    0030: Pt calm and steady on bed.    0745:Bedside and Verbal shift change report given to Ashleigh (RN) and Divya (RN) (oncoming nurse) by Veronica (RN) and Jodi (RN) (offgoing nurse). Report included the following information Nurse Handoff Report, Adult Overview, and Cardiac Rhythm SR .             
2100: Spoke with Lauren from lab about MD order for salivary cortisol test to be done at bedtime. RN was alerted by lab that the kit to complete the salivary cortisol test has to be ordered and will not be able to get here until Tuesday morning.   
ABG, Left Radial, Vent    PH 7.37  Co2 43.2  Pao2 92.1  cHco2 25.1  BE(b)-0.2  Sao2 96.9%  
ARU/IPR REFERRAL CONTACT NOTE  [facility]      Thank you for the opportunity to review this patient's case for admission to [facility].    Based on our pre-admission screening:     [x ] This patient does not meet criteria for admission to [facility] due to:    [x ] Other: Pt was denied by insurance. Pt has the opportunity to appeal if desired.     shared that SNF would be approved if requested in the next 24hrs.    Again, Thank you for this referral. Should you have any questions please do not hesitate to call.     Sincerely,  Haily Montejo    Clinical Liaison  [facility]  [phone]         
ARU/IPR REFERRAL CONTACT NOTE  [facility]      Thank you for the opportunity to review this patient's case for admission to [facility].    Based on our pre-admission screening:     [  x] Our Team/Medical Director is following this case.   Comments: Insurance is offering a peer to peer discussion for ARU.   Peer to peer must be competed by 9am Thursday 4/18/2024    MD made aware via perfect serve    Again, Thank you for this referral. Should you have any questions please do not hesitate to call.     Sincerely,  Haily Montejo    Clinical Liaison  [facility]  [phone]         
AT 0800, patient is awake and responding to commands. Started wean od CPAP at PS 12. Patient is doing Vt 549, Mv 9.37, PIP 21. SpO2 98%, . Titrated O2 99%.   
Advance Care Planning   Healthcare Decision Maker:    Primary Decision Maker: Lance Donahue - Spouse - [phone]    Click here to complete Healthcare Decision Makers including selection of the Healthcare Decision Maker Relationship (ie \"Primary\").     conducted an initial consultation and Spiritual Assessment for Christy Donahue, who is a 73 y.o.,female. Patient's Primary Language is: English.   According to the patient's EMR Evangelical Affiliation is: Pent[employer]alism.     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    Dialysis patient ([facility]) 04/06/2024    Smoking greater than 20 pack years 04/06/2024    Ulnar neuropathy at elbow of left upper extremity 04/06/2024    End stage renal disease ([facility]) 04/06/2024    Acute respiratory failure with hypoxia ([facility]) 04/06/2024    Chronic hypoxic respiratory failure ([facility]) 03/02/2024    Chronic anemia 03/02/2024    Left foot pain 01/31/2024    CHF (congestive heart failure), NYHA class I, acute on chronic, combined ([facility]) 01/29/2024    Cardiomyopathy ([facility]) 01/29/2024    Anemia, macrocytic 12/23/2023    Secondary hyperparathyroidism of renal origin ([facility]) 10/24/2023    Vitamin D deficiency 10/24/2023    Mild protein-calorie malnutrition ([facility]) 08/07/2023    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    Personal history of tobacco use, presenting hazards to health 08/06/2023    Moderate COPD (chronic obstructive pulmonary disease) ([facility]) 08/06/2023    MICHAEL (obstructive sleep apnea) 08/06/2023    Colostomy in place ([facility]) 07/04/2023    Perforated diverticulum of large intestine 07/04/2023    Abdominal adhesions 07/04/2023    S/P exploratory laparotomy 06/30/2023    Chronic heart failure with preserved ejection fraction ([facility]) 01/13/2023    Anemia due to chronic kidney disease, on chronic dialysis ([facility]) 01/13/2023    Stenosis of both vertebral arteries 12/09/2022    CVA (cerebral 
Attempted pt for OT tx at 1013. Pt politely refusing participation at this time 2/2 c/o of \" not feeling good.\" RN made aware. Will continue to follow up. Thank you  ROYCE Gotti/SAUL  
Attempted to see patient for skilled OT re-evaluation. Patient transferred to ICU and intubated 4/21/2024. Pt remains on vent with sedation at this time and currently receiving blood transfusion. OT to discontinue orders at this time, please re-order when able to participate in skilled OT re-evaluation and treatment.         Thank you for this referral,   Martha Sena MS, OTR/L    
Attempted to see patient x 2 attempts this date for re-evaluation; pt NABILA for HD. Will continue to follow and see pt when available/as appropriate.          Thank you,   Martha Sena MS, OTR/L  
Bedside and Verbal shift change report given to Concepcion Clifton RN (oncoming nurse) by Fiona RN (offgoing nurse). Report included the following information Nurse Handoff Report, Index, Recent Results, Med Rec Status, Neuro Assessment, and Event Log.      Bedside and Verbal shift change report given to Trudi RN (oncoming nurse) by Concepcion Clifton RN (offgoing nurse). Report included the following information Nurse Handoff Report, Index, MAR, Recent Results, Neuro Assessment, and Event Log.      
Bedside and Verbal shift change report given to Concepcion Clifton RN (oncoming nurse) by Fiona Zapata RN (offgoing nurse). Report included the following information Nurse Handoff Report, Index, Intake/Output, MAR, Recent Results, Neuro Assessment, and Event Log.      0730: Called dialysis unit, no answer.    1000: Amio gtt stopped.    1200: Pt converted from NSR to a-fib. HR currently ranging 100s-130s. Pt was recently working with OT and eating lunch upon entering room. Pt stated she does not feel her heart racing or any different from this morning. MD updated. Cardio will come assess pt.     1240: Called on-call dialysis. RN unaware of pt needing dialysis. Stated pt refused yesterday and did not receive scheduled dialysis. Spoke with MD if dialysis needed. MD will contact Nephro.  
Bedside and Verbal shift change report given to Dede Barajas (oncoming nurse) by Jose (offgoing nurse). Report included the following information Nurse Handoff Report, Intake/Output, Cardiac Rhythm sinus, and Neuro Assessment.Mobility,   
Bedside and Verbal shift change report given to LISETTE Marks (oncoming nurse) by LISETTE Avitia (offgoing nurse). Report included the following information Nurse Handoff Report, Adult Overview, MAR, Cardiac Rhythm ST, Neuro Assessment, and Event Log.     1930: PT alert at baseline VS stable, no s/s of distress. Bed locked, in lowest position, alarm on. Call light in reach. RT at bedside, Pt receiving breathing treatment.    2035: MD paged IRT Pt HR sustaining in the 120s-130s,     2108: 12 lead EKG complete, MD notified.    2219: Critical result received from Ramiro Navarro w/ Lab: Lactic 7.1, MD on-call notified and repeat ordered.    0230: Critical result received from Ramiro Navarro w/ Lab: Lactic 5.8, MD on-call notified and repeat ordered.    0313: Pt requests to remove BiPAP, placed back on 2L NC     0449: Pt requesting something for pain and reports back pain 10/10 with no improvement after 650mg PO Tylenol, MD paged    2730: Pt complains of chest pain 10/10. Described as \"someone sitting on my chest\". EKG complete. MD notified  
Bedside and Verbal shift change report given to LISETTE Stokes / LISETTE Novak (oncoming nurse) by LISETTE Marks (offgoing nurse). Report included the following information Nurse Handoff Report, Index, Adult Overview, Surgery Report, Cardiac Rhythm NSR, Neuro Assessment, and Event Log.      0930: Pt NABILA for Dialysis.    1310: Call from LISETTE Schaefer in Dialysis. Pt had 2L of fluid taken off. Current BP is 171/ 70 and HR is 79.    1330: pt back on the floor from Dialysis    1931: Bedside and Verbal shift change report given to RN (oncoming nurse) by LISETTE Stokes / LISETTE Novak (offgoing nurse). Report included the following information Nurse Handoff Report, Index, Adult Overview, Intake/Output, MAR, Cardiac Rhythm NSR, Neuro Assessment, and Event Log.          
Bedside and Verbal shift change report given to LISETTE Stokes / LISETTE Sotelo (oncoming nurse) by LISETTE Zapata / LISETTE Martin (offgoing nurse). Report included the following information Nurse Handoff Report, Index, Adult Overview, Intake/Output, MAR, Med Rec Status, Cardiac Rhythm NSR, Neuro Assessment, and Event Log.      0822: call from dialysis, sending transport.    0845: Transport picking up pt    1238: received telephone from dialysis, LISETTE Prasad. Pt had 2 L removed, /89, HR 74, Temp. 96.9.    1245: Pt back on the floor from dialysis    1934: Bedside and Verbal shift change report given to LISETTE Zapata / LISETTE Jaimes (oncoming nurse) by LISETTE Stokes / LISETTE Sotelo (offgoing nurse). Report included the following information Nurse Handoff Report, Index, Adult Overview, Intake/Output, MAR, Cardiac Rhythm NSR, Neuro Assessment, and Event Log.    
Bedside and Verbal shift change report given to Lucas KNOX (oncoming nurse) by Ashleigh KNOX (offgoing nurse). Report included the following information Nurse Handoff Report, Index, Adult Overview, MAR, Cardiac Rhythm Sinus, and Quality Measures.      0550: Paged hospitalist Dr. Jones to alert of patient's 6.8 Hemoglobin. New order of one unit of RBCs placed. Type in screen intact until 5430 on 4/26/24. Consent given by patient.     4030: Bedside shift report given to Odilia KNOX  
Bedside and Verbal shift change report given to Lucas KNOX (oncoming nurse) by Eneida KNOX (offgoing nurse). Report included the following information Nurse Handoff Report, Index, Adult Overview, MAR, and Quality Measures.      0400: Patient wore BIPAP for from 5882-3236 and is refusing to wear it any longer.    0600: Patient complaints of 10/10 reproducible chest pain and back pain on the right side. Vitals are currently stable, patient given percocet PRN tablet, patient complaints of phlegm in chest, patient was given incentive spirometry which she was able to get it to 1000 mL. Patient is not diaphoretic, BS is  153, no complaints of headache or shortness of breath. Charge nurse Glenny KNOX spoke with hospitalist on call Dr. Barclay to explain the situation. Patient specifically asked numerous times to not call a RRT. Patient has been having expiratory wheezing, crackles on assessment since beginning of shift.     0700: Patient currently sleeping in no signs of distress    Bedside shift report given to Trudi KNOX  
Bedside and Verbal shift change report given to Luna (oncoming nurse) by Hemalatha (offgoing nurse). Report included the following information Nurse Handoff Report, ED SBAR, Adult Overview, Intake/Output, and Neuro Assessment.     1020-Spoke with Nephrology about pt possible ultrasound of left arm to assess Dialysis Port.      1400- Patient went to restroom using 4 wheel walker with stand by assist patient tolerated well.    1425- pt Spouse at bedside.  
Bedside and Verbal shift change report given to Radha RN (oncoming nurse) by Ashanti RN (offgoing nurse). Report included the following information Nurse Handoff Report, Adult Overview, and Cardiac Rhythm SR .     Morning assessment completed, patient complains of back pain 10/10 and persistent but nonproductive cough.     0930: Morning medication administration completed,patient tolerated PO medications well without complications.PRN Tessalon  and PRN Percocet administered.     1600: PRN Tessalon and PRN Percocet administered.    Bedside and Verbal shift change report given to Jeannine RN (oncoming nurse) by Radha RN (offgoing nurse). Report included the following information Nurse Handoff Report, Adult Overview, and Cardiac Rhythm SR .                    
C/O pain right upper arm. Swelling noted above fistula site. MD Fowler notified. Pain control measures taken. Prn pain med given and Cold pack placed on arm while elevated. Dialysis RN to bedside to assess fistula. Still positive B/T. Will continue to observe.  
[facility] - Progress Note    Admit Date: 4/6/2024  Attending Cardiologist: Dr. Altamirano     Assessment:     -Acute hypoxic, hypercapnic respiratory failure  -Acute on chronic HFrEF exacerbation   -New AF/AFL with RVR. Chadsvasc score 8   -CAD, s/p prox LAD FELIPE 5/20/18, complicated by acute IST after initial PCI, requiring emergency intubation for pulmonary edema and repeat cardiac cath and additional PCI/FELIPE to prox LAD; s/p cardiac cath 8/9/2023 with non obstructive CAD (Dr. King)   -Cardiomyopathy with recovered LVEF, EF variable and as low as 35% since 2018.   -Mitral Regurgitation, varying between mild-moderate since 2018 on TTE.   -HTN  -HLD, on statin.   -DM  -ESRD, HD.   -COPD/MICHAEL, nightly cpap.   -HX DVT/PE, on Eliquis.  -Hx of GIB, AVM 01/2024.       Primary cardiologist is Dr. Lentz    Plan:     Currently on po amio 200 mg daily and Toprol-XL 25 mg daily  Currently appears to be in sinus rhythm  High risk for stroke based on chadsvasc score, not currently on oral anticoagulation for stroke prophylaxis due to Anemia, GIB/Hx AVM, continued on ASA only. Consider watchman device in the future if a candidate.   Volume mgmt per nephrology, via HD    Subjective:     No new complaints.  Feels fatigue and tired    Objective:      Patient Vitals for the past 8 hrs:   Temp Pulse Resp BP SpO2   05/01/24 1117 98.7 °F (37.1 °C) 85 17 129/69 98 %   05/01/24 0745 97.9 °F (36.6 °C) 80 17 120/67 99 %   05/01/24 0658 -- 84 16 -- 92 %           Patient Vitals for the past 96 hrs:   Weight   05/01/24 0939 84.1 kg (185 lb 6.5 oz)   04/30/24 0600 95 kg (209 lb 7 oz)         TELE: AFIB               Current Facility-Administered Medications   Medication Dose Route Frequency    insulin glargine (LANTUS) injection vial 20 Units  20 Units SubCUTAneous Nightly    insulin lispro (HUMALOG) injection vial 0-8 Units  0-8 Units SubCUTAneous TID WC    insulin lispro (HUMALOG) injection vial 0-4 Units  0-4 Units SubCUTAneous 
Comprehensive Nutrition Assessment    Type and Reason for Visit:  Initial, RD Nutrition Re-Screen/LOS    Nutrition Recommendations/Plan:   Continue current diet as tolerated, fluid restriction per MD.  Order Gelatein (each provides 80 kcal, 20g protein) once daily.  Order virt-caps daily due to potential significant wt loss/suspected suboptimal intake.  Continue to monitor tolerance of PO, compliance of oral supplements, weight, labs, and plan of care during admission.         Malnutrition Assessment:  Malnutrition Status:  Insufficient data (significant wt loss per EMR, unable to obtain info from pt, ESRD on HD) (04/13/24 1022)    Context:  Chronic Illness       Nutrition History and Allergies:   PMHx: CHF, ESRD on HD, COPD, CAD, HTN, DM2. Wt hx: 199 lb (1/29/24), 189 lb (3/9/24), 186 lb (3/26/24), 170 lb (4/7/2024), 194 lb (4/12/24). Question accuracy of current charted wt vs fluid shifts. Using admit wt, wt loss of 29 lb (14.5%) x 2.5 months per EMR hx - significant. Will continue to monitor wt trends in-house. NKFA per chart.    Nutrition Assessment:    Admitted for hypoxia and SOB. Per flowsheet, pt ate 1-25% 4/12, previously % x 5 entries documented. Pt was using bathroom at time of visit. Per RN, pt consumed all of breakfast this AM. Will continue to monitor PO intake and oral supplements to increase kcal/protein intake opportunity.    Nutrition Related Findings:    Last BM (including prior to admit): 04/04/24  Edema: None  Edema Generalized: +1 Pertinent Meds: lipitor, folic acid, gabapentin, lantus, solumedrol, protonix, midodrine, renvela, vit b12, vit d Pertinent Labs: POC Glucose 140-224 mg/dl x 24 hrs, Na 133 L, BUN/Cr 58/3.76, GFR 12 Wound Type: None       Current Nutrition Intake & Therapies:    Average Meal Intake: 26-50%, %, 1-25%     ADULT DIET; Regular; Low Fat/Low Chol/High Fiber/2 gm Na; 1500 ml    Anthropometric Measures:  Height: 162.6 cm (5' 4\")  Ideal Body Weight (IBW): 120 lbs 
Dialysis RN calls, patient has refused dialysis, attending MD notified, says  he will come to talk to her.  
Discontinuing PT orders. Transferred to ICU and intubated 4/21/2024. Remains on vent with sedation. Currently receiving blood transfusion. Please re-order PT when able to participate in skilled PT treatment.   
Dr. Barahona paged to inform him of a Potassium of 5.7.  Patient scheduled for dialysis today.      Awaiting a phone call back.     06Louis ADLRE returned page back, since dialysis is scheduled today lets see how she is afterwards.   
Dr. Dominguez paged regarding K of 3.3, patient on hemodialysis.  Per MD patient does not need replacement.   
Dr. Jones paged regarding patient refusing all medications, blood sugar checks, and oxygen.  Awaiting a call back from MD.  
HD Care plan  Time: 2 hrs  Dialysate: 2K 2.5Ca Bath  Net UF goal: 2L  Access: Aseptically care for left upper extremity AVG  Hemodynamic stability: Maintain BP WNL     Pre Dialysis:  Pt received from primary nurse. Pt arrived from room 360 on bed, A&O x 4, on vent.   No redness or swelling at AVG site. Postive bruit/thrill observed. AVG accessed per protocol.      Intra Dialysis:  Tx initiated at 1912.    For hemodynamic stability UF net goal set at 2000 ml as tolerated.    Vascular access visible, line connections remained intact throughout entire duration of treatment.   Vital signs checked every 5 minutes.      Post Dialysis:  Tx completed at 2112. 1 x 25g albumin infused during last hour of 2-hour HD.   Pt hypotensive throughout treatment. Zero ml net fluid removed.    Left UA AVG de-accessed per protocol.    Post Dialysis report given to primary nurse,   
HD Care plan  Time: 2.5 hrs  Dialysate: 2K 2.5Ca  Bath  Net UF: 2.5L  Access: Aseptically care for pt left upper arm AVF  Hemodynamic stability: Maintain BP WNL     Pre Dialysis:  Pt received from primary nurse ADALID Sherman. Pt admitted from ED with respiratory distress, on BiPap.   Pt has audible bruit, palpable thrill. AVF cannulated without difficulty.      Intra Dialysis:  HD tx initiated at 1413.    For hemodynamic stability UF goal set at 2500 net ml as tolerated   Pt offered assistance with repositioning every 2 hours/prn    Vascular access visible and line connections remained intact throughout entire duration of treatment.   Vital signs checked every 15 mins, WNL     Post Dialysis:  Tx completed at 1648.    HD tolerated fairly well with intermittent hypotension. 2404ml net fluid removed. 43.1L KT/V.   AVF de-accessed per protocol. Hemostasis at needle sites reached   Post Dialysis report given to primary nurse, ADALID Sherman RN.   
HD Care plan  Time: 3 hrs  Dialysate:  2K+   2.5Ca++  Bath  Net UF: 2L  Access: Aseptically care for LUE AVF  Hemodynamic stability: Maintain BP WNL     Pre Dialysis:  Pt received from HEMALATHA Kilgore RN. Patient arrived on a bed, A+O X 3, on 3L O2 via NC, no s/s of acute distress noted. LUE AVF assessed, no abnormalities noted, bruit and thrill strong. AVF accessed using 15 G needles without any difficulty. Good flows achieved from both needles.  Intra Dialysis:  Time out / safety process performed per policy, Tx initiated at 0945.    AVF flowing with ease. For hemodynamic stability UF goal set at 2000 ml as tolerated.  Pt offered assistance with repositioning every 2 hours/prn    Vascular access visible and line connections remained intact throughout entire duration of treatment.   Vital signs checked every 15 mins.     Post Dialysis:  Tx completed at 1245,   Tolerated well, 2 L  removed. De-accessed per protocol.    Clot time 5 minutes for arterial, and 5 minutes for venous.   Dry dressings applied.  Bruit/Thrill present above and below dressings.  Post Dialysis report given to unit Nurse HEMALATHA Kilgore RN      
HD Care plan  Time: 3 hrs  Dialysate:  2K+   2.5Ca++  Bath  Net UF: 2L  Access: Aseptically care for LUE AVF  Hemodynamic stability: Maintain BP WNL     Pre Dialysis:  Pt received from HUSEYIN Browne RN. Patient arrived on a bed, A+O X 4, on RA, no s/s of acute distress noted. LUE AVF assessed, no abnormalities noted, bruit and thrill strong. AVF accessed using 15 G needles without any difficulty. Good flows achieved from both needles.  Intra Dialysis:  Time out / safety process performed per policy, Tx initiated at 0930.    AVF flowing with ease. For hemodynamic stability UF goal set at 2000 ml as tolerated.  Pt offered assistance with repositioning every 2 hours/prn    Vascular access visible and line connections remained intact throughout entire duration of treatment.   Vital signs checked every 15 mins.     Post Dialysis:  Tx completed at 1230,   Tolerated well, 2 L  removed. De-accessed per protocol.    Clot time 5 minutes for arterial, and 5 minutes for venous.   Dry dressings applied.  Bruit/Thrill present above and below dressings.  Post Dialysis report given to unit Nurse HUSEYIN Browne, RN      
HD Care plan  Time: 3 hrs  Dialysate:  2K+   2.5Ca++  Bath  Net UF: 2L  Access: Aseptically care for LUE AVF  Hemodynamic stability: Maintain BP WNL     Pre Dialysis:  Pt received from Unit Nurse ANATOLIY Hackett RN. Treatment completed bedside in the ICU. Patient on a bed,is alert and responds to voice and light touch, able to answer yes/no questions by nodding head, intubated on a ventilator, no s/s of acute distress noted. LUE AVF assessed, bruising noted , bruit and thrill strong, AVF accessed using 15 G needles without any difficulty. Good flows achieved from both needles.  Intra Dialysis:  Time out / safety process performed per policy, Tx initiated at 0915.    AVF flowing with ease. For hemodynamic stability UF goal set at 2000 ml as tolerated.  0930- BP 94/77, albumin administered  1000- BP 95/53, UF goal decreased to 1.5L  Pt offered assistance with repositioning every 2 hours/prn    Vascular access visible and line connections remained intact throughout entire duration of treatment.   Vital signs checked every 15 mins.     Post Dialysis:  Tx completed at 1215,   Tolerated well, 1.5 L  removed. De-accessed per protocol.    Clot time 5 minutes for arterial, and 5 minutes for venous.   Dry dressings applied. Bruit/Thrill present above and below dressings.  Post Dialysis report given to unit Nurse ANATOLIY Hackett RN      
HD Care plan  Time: 3 hrs  Dialysate: 2K+   2.5Ca++  Bath  Net UF: 3L  Access: Aseptic care for 3 AVF  Hemodynamic stability: Maintain BP WNL     Pre Dialysis:  Patient received from DB Sanchez RN. Patient arrived on a bed, A+O X 3, on RA,  no s/s of acute distress noted. LUE AVF assessed, no abnormalities noted, bruit and thrill strong. AVF accessed using 15G needles without any difficulty. Good flow achieved from both needles.  Intra Dialysis:  Time out / safety process performed per policy. Tx initiated at 1415.    AVF flowing with ease. For hemodynamic stability UF goal set at 3000 ml as tolerated.  Pt offered assistance with repositioning every 2 hours/prn    Vascular access visible and line connections remained intact throughout entire duration of treatment.   Vital signs checked every 15 mins.     Post Dialysis:  Tx completed at 1715,   Tolerated well, 3 L  removed. De-accessed per protocol.    Clot time 5 minutes for arterial, and 5 minutes for venous.   Dry dressings applied. Bruit/Thrill present above and below dressings.  Post Dialysis report given to DB Sanchez RN      
I was paged by the resident that they responded to a rapid response on this patient for shortness of breath and chest pain.  Upon arrival patient was noted to be significantly short of breath and placed on nonrebreather mask.  She was hypertensive and had received IV Lopressor 5 mg push.  Per nursing staff patient's  had brought her a [employer] sandwich and then she had also eaten her meal from the hospital.  On chart review patient was dialyzed for 1 hour on Friday because there was concerns about her AV fistula not working.  Nephrology had ordered a duplex study today for her AV fistula which showed a patent AV fistula of the left upper extremity with stenosis noted at the distal anastomotic/confluence seen on 2/6/24 exam.  I reached out to nephrology and they were trying to arrange for urgent hemodialysis for this patient.  Patient continued to have increased work of breathing, she desaturated and we tried to place her on a BiPAP however she started complaining of nausea and vomiting.  [facility] was called, patient was intubated by anesthesia and transferred to the ICU for further evaluation and care.  Will reach out to the patient's family.  Will continue to monitor.  
INTERVENTION: HEMODYNAMIC STABILIZATION  MAINTAIN BP WNL WHILE ON HD.    INTERVENTION: FLUID MANAGEMENT  WILL ATTEMPT 2000 ML NET FLUID REMOVAL AS TOLERATED.    INTERVENTION: METABOLIC/ELECTROLYTE MANAGEMENT  2.0 POTASSIUM 2.5 CALCIUM DIALYSATE USED WITH HD TODAY.    INTERVENTION: HEMODIALYSIS ACCESS SITE MANAGEMENT  LEFT UPPER ARM AVG ACCESSED WITH 15G NEEDLE USING ASEPTIC TECHNIQUE.    GOAL: SIGNS AND SYMPTOMS OF LISTED POTENTIAL PROBLEMS WILL BE ABSENT OR MANAGEABLE.    OUTCOME:  PROGRESSING    HD PLANNED FOR 2 HOURS TODAY.  
INTERVENTION: HEMODYNAMIC STABILIZATION  MAINTAIN BP WNL WHILE ON HD.    INTERVENTION: FLUID MANAGEMENT  WILL ATTEMPT 2500 ML NET FLUID REMOVAL AS TOLERATED.    INTERVENTION:  METABOLIC/ELECTROLYTE MANAGEMENT  2.0 POTASSIUM 2.5 CALCIUM DIALYSATE USED WITH HD TODAY.    INTERVENTION: HEMODIALYSIS ACCESS SITE MANAGEMENT  LEFT UPPER ARM AVF ACCESSED WITH 15G NEEDLE USING ASEPTIC TECHNIQUE.    GOAL: SIGNS AND SYMPTOMS OF LISTED POTENTIAL PROBLEMS WILL BE ABSENT OR MANAGEABLE.    OUTCOME: PROGRESSING    HD PLANNED FOR 2.5 HOURS TODAY.  
Ipratropium nebulizer was therapeutically interchanged for tiotropium - olodaterol (Stiolto) MDI per the P &T Committee approved Therapeutic Interchanges Policy.  
[facility] Hospitalist [facility]  Progress Note  Date:2024       Room:[facility]  Patient Name:Christy Donahue     YOB: 1950     Age:73 y.o.        Subjective    Subjective   Review of Systems    No acute events overnight.  Patient fatigued after HD. Has loss of appetite. Otherwise, agreeable with going to SNF tomorrow.    Objective         Vitals Last 24 Hours:  TEMPERATURE:  Temp  Av.9 °F (36.6 °C)  Min: 97 °F (36.1 °C)  Max: 98.6 °F (37 °C)  RESPIRATIONS RANGE: Resp  Av.9  Min: 16  Max: 20  PULSE OXIMETRY RANGE: SpO2  Av.1 %  Min: 92 %  Max: 99 %  PULSE RANGE: Pulse  Av.6  Min: 68  Max: 82  BLOOD PRESSURE RANGE: Systolic (24hrs), Av , Min:113 , Max:147     ; Diastolic (24hrs), Av, Min:55, Max:79      I/O (24Hr):    Intake/Output Summary (Last 24 hours) at 2024 1414  Last data filed at 2024 1212  Gross per 24 hour   Intake 500 ml   Output 3500 ml   Net -3000 ml     Objective:  General Appearance:  Comfortable.    Vital signs: (most recent): Blood pressure 127/70, pulse 77, temperature 98.1 °F (36.7 °C), temperature source Oral, resp. rate 18, height 1.549 m (5' 1\"), weight 82.2 kg (181 lb 3.5 oz), SpO2 92 %.    Output: Producing urine.    HEENT: Normal HEENT exam.    Lungs:  Normal effort and normal respiratory rate.  Breath sounds clear to auscultation.    Heart: Normal rate.  Regular rhythm.    Abdomen: Abdomen is soft and flat.  Bowel sounds are normal.   There is no abdominal tenderness.     Extremities: Normal range of motion.    Pulses: Distal pulses are intact.    Neurological: Patient is alert and oriented to person, place and time.    Skin:  Warm and dry.          Labs/Imaging/Diagnostics    Labs:  CBC:  Recent Labs     24  0200 24  0243 24  0527   WBC 13.4* 10.6 12.3   RBC 2.65* 2.54* 2.46*   HGB 8.1* 7.5* 7.5*   HCT 25.5* 24.2* 23.8*   MCV 96.2 95.3 96.7   RDW 20.3* 19.9* 20.3*    278 283 
[facility] Hospitalist Group  Progress Note    Patient: Christy Donahue Age: 73 y.o. : 1950 MR#: [id number] SSN: [id number]  Date/Time: 2024     Subjective:   Patient doing well today.  Had some chest pain this morning.  Chest pain seems to respond well to pain medicines although would avoid escalation of IV pain medicine.  Patient's breathing has improved slightly but she reports some congestion.    Review of systems  General: No fevers or chills.  Cardiovascular: No chest pain or pressure. No palpitations.   Pulmonary: No shortness of breath, cough or wheeze.   Gastrointestinal: No abdominal pain, nausea, vomiting or diarrhea.   Genitourinary: No urinary frequency, urgency, hesitancy or dysuria.   Musculoskeletal: No joint or muscle pain, no back pain, no recent trauma.    Neurologic: No headache, numbness, tingling or weakness.   Assessment/Plan:   Acute on chronic hypoxic respiratory failure on 2 to 3 L outpatient  COPD with exacerbation  Type 2 diabetes  History of MICHAEL on CPAP at night  CAD, ischemic, cardiomyopathy  CHF  Hypertension  Hyperlipidemia  History of breast cancer  History of rectal cancer  History of CVA  End-stage renal disease on hemodialysis  Anemia of chronic disease  Lactic acidosis  History of DVT  Pulmonary nodule 4.5 cm RLL     Admit to 3 N.  Cardiac monitoring  IV steroids, bronchodilators and DuoNebs  Doxycycline for COPD exacerbation  Supplemental oxygen, wean as tolerated  POCT glucose checks and ISS  Nephrology consulted for dialysis  Mildly elevated troponin likely related to poor renal clearance and not related to ACS.  Chest pain reported similar to prior episodes during different admissions and given the EKG, and low chronic troponin elevation ACS is not suspected at this time  CTA chest negative, will dc heparin  Continue aspirin   continue statin and Zetia  Continue gabapentin  Continue midodrine for softer pressures  Lactic acid improving, 
[facility] Mclaughlin [facility] Hospitalist Group  Progress Note    Patient: Christy Donahue Age: 73 y.o. : 1950 MR#: [id number] SSN: [id number]  Date/Time: 5/3/2024     Subjective:   Patient doing well, no pain complaints.  Labs stable.  Leukocytosis resolving.  Discharged to [facility] rehab facility on Monday.    Review of systems  General: No fevers or chills.  Cardiovascular: No chest pain or pressure. No palpitations.   Pulmonary: No shortness of breath, cough or wheeze.   Gastrointestinal: No abdominal pain, nausea, vomiting or diarrhea.   Genitourinary: No urinary frequency, urgency, hesitancy or dysuria.   Musculoskeletal: No joint or muscle pain, no back pain, no recent trauma.    Neurologic: No headache, numbness, tingling or weakness.   Assessment/Plan:   ESRD, HD dependent  Ileus v partial SBO, NGT in place  Electrolyte abnormalitiy  Acute on chronic diastolic CHF  Pulmonary hypertension  Anemia of chronic disease  COPD  MICHAEL  Acute hypoxic and hypercapnic respiratory failure, resolved  Acute metabolic encephalopathy, resolved     Admitted to [facility].  Cardiac monitoring next  Easy to chew, low phosphorus diet  Wean IV steroids and transition to oral prednisone, taper in progress  Speech following  Hd per nephrology, patient agreeable  PT/OT  SNF discharge plan for Monday  Negative ACTH test  Cardiology following, currently on amiodarone 200 mg oral.  Metoprolol added for rate control. In NSR currently.  Regarding anticoagulation, patient is only on asa and heparin Dvt prophylaxis, given recent hemoglobin of 5.8 and history of GI bleed would not advocate for aggressive anticoagulation      I spent 40 minutes with the patient in face-to-face consultation, of which greater than 50% was spent in counseling and coordination of care as described above.     Case discussed with:  [x]Patient  []Family  []Nursing  []Case Management  DVT Prophylaxis:  []Lovenox  [x]Hep SQ  []SCDs  []Coumadin   
[facility] Mclaughlin [facility] Hospitalist Group  Progress Note    Patient: Christy Donahue Age: 73 y.o. : 1950 MR#: [id number] SSN: [id number]      Subjective/24-hour events:     Transferred to medical service from ICU on .   Chart reviewed.  Patient required transfer to ICU due to decompensation in respiratory status.  Patient ultimately required intubation/mechanical ventilation for respiratory support.  Etiology of respiratory decompensation was felt to be volume overload due to patient's inability to tolerate hemodialysis on previous days.  Patient was able to be extubated on  and respiratory status has remained stable.  Patient remains on amiodarone due to A-fib with RVR.  There was concern for ileus vs partial SBO. However - CT ruled this out.     Assessment:   ESRD, HD dependent  Ileus v partial SBO, NGT in place  Electrolyte abnormalitiy  Acute on chronic diastolic CHF  Pulmonary hypertension  Anemia of chronic disease  COPD  MICHAEL  Acute hypoxic and hypercapnic respiratory failure, resolved  Acute metabolic encephalopathy, resolved    Plan:   Patient without NGT. This was pulled yesterday.   CT result showed resolution of Ileus. NG tube not replaced.  Initiate Diet as tolerated if remains stable.  SLP saw today.  HD per nephrology. Had HD today.   PT/OT.  Have asked CM to initiate auth process for SNF placement.  Continue other medical management as ordered.  Follow.    Case discussed with:  [x]Patient  []Family  [x] Nursing  []Case Management  DVT Prophylaxis:  []Lovenox  []Hep SQ  []SCDs  []Coumadin   []On Heparin gtt []PO anticoagulant    Objective:   VS: BP (!) 167/67   Pulse (!) 102   Temp 98.9 °F (37.2 °C) (Oral)   Resp 18   Ht 1.549 m (5' 1\")   Wt 98.1 kg (216 lb 4.3 oz)   SpO2 97%   BMI 40.86 kg/m²      Tmax/24hrs: Temp (24hrs), Av.9 °F (36.6 °C), Min:97 °F (36.1 °C), Max:99.2 °F (37.3 °C)    Intake/Output Summary (Last 24 hours) at 2024  Last data filed at 
[facility]vel Mclaughlin [facility] Hospitalist Group  Progress Note    Patient: Christy Donahue Age: 73 y.o. : 1950 MR#: [id number] SSN: [id number]      Subjective/24-hour events:     Called by ICU team to transfer patient back to the medical service due to improvement in overall condition and continued clinical stability.  Chart reviewed.  Patient required transfer to ICU due to decompensation in respiratory status.  Patient ultimately required intubation/mechanical ventilation for respiratory support.  Etiology of respiratory decompensation was felt to be volume overload due to patient's inability to tolerate hemodialysis on previous days.  Patient was able to be extubated yesterday and respiratory status has remained stable.  Patient remains on amiodarone due to A-fib with RVR.  There is some concern for ileus vs partial SBO.    Assessment:   ESRD, HD dependent  Ileus v partial SBO, NGT in place  Electrolyte abnormalitiy  Acute on chronic diastolic CHF  Pulmonary hypertension  Anemia of chronic disease  COPD  MICHAEL  Acute hypoxic and hypercapnic respiratory failure, resolved  Acute metabolic encephalopathy, resolved    Plan:   Patient has pulled NGT.  Followed up CT results, will leave out for now.  Will replace if worsening symptoms.  Initiate Diet as tolerated if remains stable.  HD per nephrology.  PT/OT.  Have asked CM to initiate auth process for SNF placement.  Continue other medical management as ordered.  Follow.    Case discussed with:  [x]Patient  []Family  [x] Nursing  []Case Management  DVT Prophylaxis:  []Lovenox  []Hep SQ  []SCDs  []Coumadin   []On Heparin gtt []PO anticoagulant    Objective:   VS: BP (!) 147/59   Pulse 98   Temp 98 °F (36.7 °C) (Oral)   Resp 16   Ht 1.549 m (5' 1\")   Wt 88.7 kg (195 lb 8.8 oz)   SpO2 98%   BMI 36.95 kg/m²      Tmax/24hrs: Temp (24hrs), Av.4 °F (36.9 °C), Min:98 °F (36.7 °C), Max:99.1 °F (37.3 °C)    Intake/Output Summary (Last 24 hours) at 2024 
[facility]  Progress Note    Patient: Christy Donahue Age: 73 y.o. : 1950 MR#: [id number] SSN: [id number]  Date/Time: 2024     Subjective:   I personally saw and evaluated this patient face-to-face today.  Patient is lying in bed in no apparent distress.  Patient complains of dyspnea on exertion and fatigue.  Assessment/Plan:   Acute on chronic hypoxic respiratory failure on 2 to 3 L outpatient  COPD with exacerbation  Type 2 diabetes  History of MICHAEL on CPAP at night  CAD, ischemic, cardiomyopathy  CHF  Hypertension  Hyperlipidemia  History of breast cancer  History of rectal cancer  History of CVA  End-stage renal disease on hemodialysis  Anemia of chronic disease  Lactic acidosis  History of DVT  Pulmonary nodule 4.5 cm RLL     Admit to 3 N.  Cardiac monitoring  Continue IV Solu-Medrol  Bronchial hygiene  Status post doxycycline for COPD exacerbation  Pulmonary following  Supplemental oxygen, wean as tolerated  POCT glucose checks and ISS  Continue Lantus and sliding scale insulin  Tussionex per pulm  Continue hemodialysis per nephrology  Mildly elevated troponin likely related to poor renal clearance and not related to ACS.  Chest pain reported similar to prior episodes during different admissions and given the EKG, and low chronic troponin elevation ACS is not suspected at this time. Likely related to MSK/cough  CTA chest negative, will dc heparin  Continue aspirin   continue statin and Zetia  Continue gabapentin  Continue midodrine for softer pressures  Lactic acid normalized, likely relating to initial hypoxia  I counseled patient regarding participating with PT and OT.  Patient is agreeable.  Reconsult PT and OT  I discussed the care plan with the patient.  I discussed with the nephrologist       Case discussed with:  [x]Patient  []Family  []Nursing  []Case Management  DVT Prophylaxis: On heparin for DVT prophylax    Objective:   VS: BP (!) 149/66   Pulse 
[facility]  Progress Note    Patient: Christy Donahue Age: 73 y.o. : 1950 MR#: [id number] SSN: [id number]  Date/Time: 2024     Subjective:     Patient seen evaluated, lying in bed, no acute distress.  Patient complaining of shortness of breath and feels she is not ready to go home today.  Continue current treatment plan.  Anticipate discharge home once breathing has improved and she has been switched to oral steroids.     -patient seen and evaluated, lying in bed, no acute distress.  She was seen by PT and OT and recommended rehab however mentions she does not wish to go to a skilled nursing facility and is okay going to acute rehab.  Advised acute rehab to evaluate patient.    -patient seen evaluated, lying in bed, no acute distress.  Patient is being evaluated by ARU.  Patient was complaining of constipation yesterday, had a large bowel movement and feels better at this time.  Continue to follow.    -patient seen evaluated, lying in bed, no acute distress.  Patient was declined by ARU.  Patient initially did not wish to go to skilled nursing facility and I placed discharge orders today however on patient now says that she is agreeable to go to skilled nursing facility, case management on board for discharge planning.  Continue current treatment plan, will follow.    Assessment/Plan:   Acute on chronic hypoxic respiratory failure on 2 to 3 L outpatient  COPD with exacerbation  Type 2 diabetes  History of MICHAEL on CPAP at night  CAD, ischemic, cardiomyopathy  CHF  Hypertension  Hyperlipidemia  History of breast cancer  History of rectal cancer  History of CVA  End-stage renal disease on hemodialysis  Anemia of chronic disease  Lactic acidosis  History of DVT  Pulmonary nodule 4.5 cm RLL     Admit to 3 N.  Cardiac monitoring  Appreciate pulmonology input, IV steroids changed to p.o. prednisone.  Bronchial hygiene  Status post doxycycline for COPD 
[facility]vel Mclaughlin [facility] Hospitalist Group  Progress Note    Patient: Christy Donahue Age: 73 y.o. : 1950 MR#: [id number] SSN: [id number]  Date/Time: 2024     Subjective:     Patient seen evaluated, lying in bed, no acute distress.  Patient complaining of shortness of breath and feels she is not ready to go home today.  Continue current treatment plan.  Anticipate discharge home once breathing has improved and she has been switched to oral steroids.     -patient seen and evaluated, lying in bed, no acute distress.  She was seen by PT and OT and recommended rehab however mentions she does not wish to go to a skilled nursing facility and is okay going to acute rehab.  Advised acute rehab to evaluate patient.    -patient seen evaluated, lying in bed, no acute distress.  Patient is being evaluated by ARU.  Patient was complaining of constipation yesterday, had a large bowel movement and feels better at this time.  Continue to follow.    -patient seen evaluated, lying in bed, no acute distress.  Patient was declined by ARU.  Patient initially did not wish to go to skilled nursing facility and I placed discharge orders today however on patient now says that she is agreeable to go to skilled nursing facility, case management on board for discharge planning.  Continue current treatment plan, will follow.    -patient seen evaluated, lying in bed, no acute distress.  Discussed with case management, patient is undergoing insurance authorization to be discharged and will decide when she can continue PT OT and will also continue hemodialysis.  Continue current treatment plan, anticipate discharge once authorization has been issued    -patient seen and evaluated, lying in bed, no acute distress.  Case management on board for discharge planning to order signed, awaiting insurance authorization.  Will continue to follow.    -no new events overnight, awaiting discharge to skilled nursing 
Marvel Mclaughlin [facility] Hospitalist Group  Progress Note    Patient: Christy Donahue Age: 73 y.o. : 1950 MR#: [id number] SSN: [id number]  Date/Time: 4/15/2024     Subjective:     Patient seen evaluated, lying in bed, no acute distress.  Patient complaining of shortness of breath and feels she is not ready to go home today.  Continue current treatment plan.  Anticipate discharge home once breathing has improved and she has been switched to oral steroids.    Assessment/Plan:   Acute on chronic hypoxic respiratory failure on 2 to 3 L outpatient  COPD with exacerbation  Type 2 diabetes  History of MICHAEL on CPAP at night  CAD, ischemic, cardiomyopathy  CHF  Hypertension  Hyperlipidemia  History of breast cancer  History of rectal cancer  History of CVA  End-stage renal disease on hemodialysis  Anemia of chronic disease  Lactic acidosis  History of DVT  Pulmonary nodule 4.5 cm RLL     Admit to 3 N.  Cardiac monitoring  Continue IV Solu-Medrol per pulmonary  Bronchial hygiene  Status post doxycycline for COPD exacerbation  Pulmonary following  Supplemental oxygen, wean as tolerated  Continue Lantus and sliding scale insulin  Tussionex per pulm  Continue hemodialysis per nephrology  Mildly elevated troponin likely related to poor renal clearance and not related to ACS.  Chest pain reported similar to prior episodes during different admissions and given the EKG, and low chronic troponin elevation ACS is not suspected at this time. Likely related to MSK/cough  CTA chest negative, will dc heparin  Continue aspirin   continue statin and Zetia  Continue gabapentin  Continue midodrine for softer pressures  Lactic acid normalized, likely relating to initial hypoxia  PT OT recommending skilled nursing facility but patient wishes to go to ARU.  ARU to evaluate patient.         Case discussed with:  [x]Patient  []Family  []Nursing  []Case Management  DVT Prophylaxis: On heparin for DVT prophylax    Objective:   VS: BP 
[facility]vel Mclaughlin [facility] Hospitalist Group  Progress Note    Patient: Christy Donahue Age: 73 y.o. : 1950 MR#: [id number] SSN: [id number]  Date/Time: 2024     Subjective:   Patient states she feels poorly.  Complains of fatigue after dialysis.  Educated the patient on participating in physical and Occupational Therapy, likely discharge in the next 1 to 2 days.  Reports congestion for which she is on numerous medicines for    Review of systems  General: No fevers or chills.  Cardiovascular: No chest pain or pressure. No palpitations.   Pulmonary: No shortness of breath, cough or wheeze.   Gastrointestinal: No abdominal pain, nausea, vomiting or diarrhea.   Genitourinary: No urinary frequency, urgency, hesitancy or dysuria.   Musculoskeletal: No joint or muscle pain, no back pain, no recent trauma.    Neurologic: No headache, numbness, tingling or weakness.   Assessment/Plan:   Acute on chronic hypoxic respiratory failure on 2 to 3 L outpatient  COPD with exacerbation  Type 2 diabetes  History of MICHAEL on CPAP at night  CAD, ischemic, cardiomyopathy  CHF  Hypertension  Hyperlipidemia  History of breast cancer  History of rectal cancer  History of CVA  End-stage renal disease on hemodialysis  Anemia of chronic disease  Lactic acidosis  History of DVT  Pulmonary nodule 4.5 cm RLL     Admit to 3 N.  Cardiac monitoring  IV steroids, bronchodilators and DuoNebs  Doxycycline for COPD exacerbation  Pulmonary following  Mucomyst and metaneb  Supplemental oxygen, wean as tolerated  POCT glucose checks and ISS  Tussionex per pulm  Nephrology consulted for dialysis  Mildly elevated troponin likely related to poor renal clearance and not related to ACS.  Chest pain reported similar to prior episodes during different admissions and given the EKG, and low chronic troponin elevation ACS is not suspected at this time. Likely related to MSK/cough  CTA chest negative, will dc heparin  Continue aspirin   continue 
Marvel Mclaughlin [facility] Hospitalist Group  Progress Note    Patient: Christy Donahue Age: 73 y.o. : 1950 MR#: [id number] SSN: [id number]  Date/Time: 2024     Subjective:   Patient had a RRT called this morning for chest pain and received some pain medicine for that and it has improved.  EKG nonrevealing.  Lactic acid improved.  Patient known from prior visits and requested pain medicine at that time as well.  Will avoid escalating any opiate like medications    Review of systems  General: No fevers or chills.  Cardiovascular: No chest pain or pressure. No palpitations.   Pulmonary: No shortness of breath, cough or wheeze.   Gastrointestinal: No abdominal pain, nausea, vomiting or diarrhea.   Genitourinary: No urinary frequency, urgency, hesitancy or dysuria.   Musculoskeletal: No joint or muscle pain, no back pain, no recent trauma.    Neurologic: No headache, numbness, tingling or weakness.   Assessment/Plan:   Acute on chronic hypoxic respiratory failure on 2 to 3 L outpatient  COPD with exacerbation  Type 2 diabetes  History of MICHAEL on CPAP at night  CAD, ischemic, cardiomyopathy  CHF  Hypertension  Hyperlipidemia  History of breast cancer  History of rectal cancer  History of CVA  End-stage renal disease on hemodialysis  Anemia of chronic disease  Lactic acidosis  History of DVT    Admit to 3 N.  Cardiac monitoring   IV steroids, bronchodilators and DuoNebs  Doxycycline for COPD exacerbation  Supplemental oxygen, wean as tolerated  POCT glucose checks and ISS  Nephrology consulted for dialysis  Mildly elevated troponin likely related to poor renal clearance and not related to ACS.  Chest pain reported today similar to prior episodes during different admissions and given the EKG, and low chronic troponin elevation ACS is not suspected at this time  CTA chest to rule out PE if negative can stop heparin drip  Continue aspirin   continue statin and Zetia  Continue gabapentin  Continue midodrine 
Marvel Mclaughlin [facility] Hospitalist Group  Progress Note    Patient: Christy Donahue Age: 73 y.o. : 1950 MR#: [id number] SSN: [id number]  Date/Time: 2024     Subjective:     Patient seen evaluated, lying in bed, no acute distress.  Patient complaining of shortness of breath and feels she is not ready to go home today.  Continue current treatment plan.  Anticipate discharge home once breathing has improved and she has been switched to oral steroids.     -patient seen and evaluated, lying in bed, no acute distress.  She was seen by PT and OT and recommended rehab however mentions she does not wish to go to a skilled nursing facility and is okay going to acute rehab.  Advised acute rehab to evaluate patient.    Assessment/Plan:   Acute on chronic hypoxic respiratory failure on 2 to 3 L outpatient  COPD with exacerbation  Type 2 diabetes  History of MICHAEL on CPAP at night  CAD, ischemic, cardiomyopathy  CHF  Hypertension  Hyperlipidemia  History of breast cancer  History of rectal cancer  History of CVA  End-stage renal disease on hemodialysis  Anemia of chronic disease  Lactic acidosis  History of DVT  Pulmonary nodule 4.5 cm RLL     Admit to 3 N.  Cardiac monitoring  Appreciate pulmonology input, IV steroids changed to p.o. prednisone.  Bronchial hygiene  Status post doxycycline for COPD exacerbation  Pulmonary following  Supplemental oxygen, wean as tolerated  Continue Lantus and sliding scale insulin  Tussionex per pulm  Continue hemodialysis per nephrology  Mildly elevated troponin likely related to poor renal clearance and not related to ACS.  Chest pain reported similar to prior episodes during different admissions and given the EKG, and low chronic troponin elevation ACS is not suspected at this time. Likely related to MSK/cough  CTA chest negative, will dc heparin  Continue aspirin   continue statin and Zetia  Continue gabapentin  Continue midodrine for softer pressures  Lactic acid 
[facility]  Pulmonary, Critical Care, and Sleep Medicine    Name: Christy Donahue MRN: [id number]   : 1950 Hospital: [facility]   Date: 2024        IMPRESSION:   Acute hypoxic respiratory failure: likely due to pulmonary edema.  CXR unchanged.  COPD/Pulmonary emphysema: likely producing a small component of symptoms.  CHF with systolic, diastolic and valvular dysfunction.  Patient has had multiple weeks admissions for decompensated CHF and pulm edema.  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 Dillon. Noncompliant.  Chronic pain syndrome  Pulmonary nodule: 4.5 mm RLL nodule along with mediastinal adenopathy.  Hyponatremia   History of tobacco abuse: Quit in ; >50 pack-year history.  H/o Breast & Rectal Ca.  DM  ESRD on HD, MWF  H/o CVA     Patient Active Problem List   Diagnosis    Environmental allergies    MICHAEL on CPAP    Chronic pain syndrome    Osteoarthritis of both knees    Coronary artery disease involving native coronary artery of native heart without angina pectoris    Chronic obstructive pulmonary disease (HCC)    Bilateral shoulder pain    CAD (coronary artery disease)    Compliance with medication regimen    Chronic pain of both knees    Ischemic cardiomyopathy    Hyperlipidemia    Iron deficiency anemia    Former smoker    Moderate mitral valve regurgitation    Cardiomyopathy, dilated (HCC)    COPD with acute exacerbation (HCC)    Primary osteoarthritis of both knees    Decreased GFR    Hypertension    Breast cancer (HCC)    Rectal cancer (HCC)    History of myocardial infarction    Pulmonary hypertension (HCC)    CHF (congestive heart failure) (HCC)    CVA (cerebral vascular accident) (HCC)    End-stage renal disease on hemodialysis (HCC)    Stenosis of both vertebral arteries    Type 2 diabetes mellitus with hyperglycemia, with long-term 
[facility]  Pulmonary, Critical Care, and Sleep Medicine    Name: Christy Donahue MRN: [id number]   : 1950 Hospital: [facility]   Date: 4/15/2024        IMPRESSION:   Acute hypoxic respiratory failure: likely due to pulmonary edema.  CXR unchanged.  COPD/Pulmonary emphysema: likely producing a small component of symptoms.  CHF with systolic, diastolic and valvular dysfunction.  Patient has had multiple weeks admissions for decompensated CHF and pulm edema.  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 Dillon. Noncompliant.  Chronic pain syndrome  Pulmonary nodule: 4.5 mm RLL nodule along with mediastinal adenopathy.  Hyponatremia   History of tobacco abuse: Quit in ; >50 pack-year history.  H/o Breast & Rectal Ca.  DM  ESRD on HD, MWF  H/o CVA     Patient Active Problem List   Diagnosis    Environmental allergies    MICHAEL on CPAP    Chronic pain syndrome    Osteoarthritis of both knees    Coronary artery disease involving native coronary artery of native heart without angina pectoris    Chronic obstructive pulmonary disease (HCC)    Bilateral shoulder pain    CAD (coronary artery disease)    Compliance with medication regimen    Chronic pain of both knees    Ischemic cardiomyopathy    Hyperlipidemia    Iron deficiency anemia    Former smoker    Moderate mitral valve regurgitation    Cardiomyopathy, dilated (HCC)    COPD with acute exacerbation (HCC)    Primary osteoarthritis of both knees    Decreased GFR    Hypertension    Breast cancer (HCC)    Rectal cancer (HCC)    History of myocardial infarction    Pulmonary hypertension (HCC)    CHF (congestive heart failure) (HCC)    CVA (cerebral vascular accident) (HCC)    End-stage renal disease on hemodialysis (HCC)    Stenosis of both vertebral arteries    Type 2 diabetes mellitus with hyperglycemia, with long-term 
[facility]  Pulmonary, Critical Care, and Sleep Medicine    Name: Christy Donahue MRN: [id number]   : 1950 Hospital: [facility]   Date: 4/15/2024        IMPRESSION:   Acute hypoxic respiratory failure: likely due to pulmonary edema.  CXR unchanged.  COPD/Pulmonary emphysema: likely producing a small component of symptoms.  CHF with systolic, diastolic and valvular dysfunction.  Patient has had multiple weeks admissions for decompensated CHF and pulm edema.  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 Dillon. Noncompliant.  Chronic pain syndrome  Pulmonary nodule: 4.5 mm RLL nodule along with mediastinal adenopathy.  Hyponatremia   History of tobacco abuse: Quit in ; >50 pack-year history.  H/o Breast & Rectal Ca.  DM  ESRD on HD, MWF  H/o CVA     Patient Active Problem List   Diagnosis    Environmental allergies    MICHAEL on CPAP    Chronic pain syndrome    Osteoarthritis of both knees    Coronary artery disease involving native coronary artery of native heart without angina pectoris    Chronic obstructive pulmonary disease (HCC)    Bilateral shoulder pain    CAD (coronary artery disease)    Compliance with medication regimen    Chronic pain of both knees    Ischemic cardiomyopathy    Hyperlipidemia    Iron deficiency anemia    Former smoker    Moderate mitral valve regurgitation    Cardiomyopathy, dilated (HCC)    COPD with acute exacerbation (HCC)    Primary osteoarthritis of both knees    Decreased GFR    Hypertension    Breast cancer (HCC)    Rectal cancer (HCC)    History of myocardial infarction    Pulmonary hypertension (HCC)    CHF (congestive heart failure) (HCC)    CVA (cerebral vascular accident) (HCC)    End-stage renal disease on hemodialysis (HCC)    Stenosis of both vertebral arteries    Type 2 diabetes mellitus with hyperglycemia, with long-term 
[facility]  Pulmonary, Critical Care, and Sleep Medicine    Name: Christy Donahue MRN: [id number]   : 1950 Hospital: [facility]   Date: 2024        IMPRESSION:   Acute hypoxic respiratory failure: likely due to pulmonary edema.  COPD/Pulmonary emphysema: likely producing a small component of symptoms.  CHF with systolic, diastolic and valvular dysfunction.  Patient has had multiple weeks admissions for decompensated CHF and pulm edema.  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 Dillon. Noncompliant.  Chronic pain syndrome  Pulmonary nodule: 4.5 mm RLL nodule along with mediastinal adenopathy.  Hyponatremia  History of tobacco abuse: Quit in ; >50 pack-year history.  H/o Breast & Rectal Ca.  DM  ESRD on HD, MWF  H/o CVA     Patient Active Problem List   Diagnosis    Environmental allergies    MICHAEL on CPAP    Chronic pain syndrome    Osteoarthritis of both knees    Coronary artery disease involving native coronary artery of native heart without angina pectoris    Chronic obstructive pulmonary disease (HCC)    Bilateral shoulder pain    CAD (coronary artery disease)    Compliance with medication regimen    Chronic pain of both knees    Ischemic cardiomyopathy    Hyperlipidemia    Iron deficiency anemia    Former smoker    Moderate mitral valve regurgitation    Cardiomyopathy, dilated (HCC)    COPD with acute exacerbation (HCC)    Primary osteoarthritis of both knees    Decreased GFR    Hypertension    Breast cancer (HCC)    Rectal cancer (HCC)    History of myocardial infarction    Pulmonary hypertension (HCC)    CHF (congestive heart failure) (HCC)    CVA (cerebral vascular accident) (HCC)    End-stage renal disease on hemodialysis (HCC)    Stenosis of both vertebral arteries    Type 2 diabetes mellitus with hyperglycemia, with long-term current use of 
[facility].  Pulmonary, Critical Care, and Sleep Medicine    Daily Progress Note    Name: Christy Donahue MRN: [id number]   : 1950 Hospital: [facility]   Date: 2024        IMPRESSION:   Acute hypoxic respiratory failure: likely due to pulmonary edema.  COPD/Pulmonary emphysema: likely producing a small component of symptoms.  CHF with systolic, diastolic and valvular dysfunction.  Patient has had multiple weeks admissions for decompensated CHF and pulm edema.  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 Dillon. Noncompliant.  Chronic pain syndrome  Pulmonary nodule: 4.5 mm RLL nodule along with mediastinal adenopathy.  Hyponatremia  History of tobacco abuse: Quit in ; >50 pack-year history.  H/o Breast & Rectal Ca.  DM  ESRD on HD, MWF  H/o CVA     Patient Active Problem List   Diagnosis    Environmental allergies    MICHAEL on CPAP    Chronic pain syndrome    Osteoarthritis of both knees    Coronary artery disease involving native coronary artery of native heart without angina pectoris    Chronic obstructive pulmonary disease (HCC)    Bilateral shoulder pain    CAD (coronary artery disease)    Compliance with medication regimen    Chronic pain of both knees    Ischemic cardiomyopathy    Hyperlipidemia    Iron deficiency anemia    Former smoker    Moderate mitral valve regurgitation    Cardiomyopathy, dilated (HCC)    COPD with acute exacerbation (HCC)    Primary osteoarthritis of both knees    Decreased GFR    Hypertension    Breast cancer (HCC)    Rectal cancer (HCC)    History of myocardial infarction    Pulmonary hypertension (HCC)    CHF (congestive heart failure) (HCC)    CVA (cerebral vascular accident) (HCC)    End-stage renal disease on hemodialysis (HCC)    Stenosis of both vertebral arteries    Type 2 diabetes mellitus with hyperglycemia, with 
[facility].  Pulmonary, Critical Care, and Sleep Medicine    Name: Christy Donahue MRN: [id number]   : 1950 Hospital: [facility]   Date: 2024  Admission Date: 2024     Chart and notes reviewed. Data reviewed. I have evaluated all findings.    [x]I have reviewed the flowsheet and previous day’s notes.    [x]The patient is unable to give any meaningful history or review of systems because the patient is:  [x]Intubated []Sedated   []Unresponsive      [x]The patient is critically ill on      [x]Mechanical ventilation []Pressors   []BiPAP []         Interval HPI:  Patient is a 73 y.o. female with a past medical history of COPD, CHF, end-stage renal disease, CAD, cancer, DVT, multiple GI surgeries, LTOT who presented to [facility] originally on  for a COPD exacerbation. Her stay was complicated by persistent fatigue and refusal to participate in PT.  Unfortunately on  her fistula malfunctioned and she was unable to tolerate a full session of HD.  Her fistula was evaluated with a vascular study on  and was reported to be functioning.  she acutely decompensated on  prior to being able to receive another session of dialysis.  She suffered a respiratory arrest and required emergent intubation and transferred to the ICU for emergent dialysis.         Subjective 24  Hospital Day: 15  Vent Day:2  Overnight events: no acute events overnight   Mentation/Activity: follows commands  Respiratory/ Secretions: mild secretions, on SBT with PS 12  Hemodynamics: stable, has not required pressors  Urine output, bowel: on HD  Diet: NPO  Need for procedures: none              ROS:Review of systems not obtained due to patient factors.    Events, medications, imaging, and notes from last 24 hours reviewed.     Patient Active Problem List   Diagnosis    Environmental allergies    MICHAEL on CPAP    Chronic pain syndrome    Osteoarthritis of both knees    Coronary artery disease 
[facility]  Progress Note    Patient: Christy Donahue Age: 73 y.o. : 1950 MR#: [id number] SSN: [id number]  Date/Time: 4/10/2024     Subjective:   Patient stable. Reports shortness of breath and cough. Slowly improving overall.    Review of systems  General: No fevers or chills.  Cardiovascular: No chest pain or pressure. No palpitations.   Pulmonary: No shortness of breath, cough or wheeze.   Gastrointestinal: No abdominal pain, nausea, vomiting or diarrhea.   Genitourinary: No urinary frequency, urgency, hesitancy or dysuria.   Musculoskeletal: No joint or muscle pain, no back pain, no recent trauma.    Neurologic: No headache, numbness, tingling or weakness.   Assessment/Plan:   Acute on chronic hypoxic respiratory failure on 2 to 3 L outpatient  COPD with exacerbation  Type 2 diabetes  History of MICHAEL on CPAP at night  CAD, ischemic, cardiomyopathy  CHF  Hypertension  Hyperlipidemia  History of breast cancer  History of rectal cancer  History of CVA  End-stage renal disease on hemodialysis  Anemia of chronic disease  Lactic acidosis  History of DVT  Pulmonary nodule 4.5 cm RLL     Admit to 3 N.  Cardiac monitoring  IV steroids, bronchodilators and DuoNebs  Doxycycline for COPD exacerbation  Pulmonary following  Supplemental oxygen, wean as tolerated  POCT glucose checks and ISS  Nephrology consulted for dialysis  Mildly elevated troponin likely related to poor renal clearance and not related to ACS.  Chest pain reported similar to prior episodes during different admissions and given the EKG, and low chronic troponin elevation ACS is not suspected at this time. Likely related to MSK/cough  CTA chest negative, will dc heparin  Continue aspirin   continue statin and Zetia  Continue gabapentin  Continue midodrine for softer pressures  Lactic acid normalized, likely relating to initial hypoxia  PT/OT     I spent 40 minutes with the patient in face-to-face consultation, of 
[facility]  Progress Note    Patient: Christy Donahue Age: 73 y.o. : 1950 MR#: [id number] SSN: [id number]  Date/Time: 2024     Subjective:   Patient refusing morning meds and dialysis today. After discussion she was agreeable to dialysis and her meds later in the day.    Review of systems  General: No fevers or chills.  Cardiovascular: No chest pain or pressure. No palpitations.   Pulmonary: No shortness of breath, cough or wheeze.   Gastrointestinal: No abdominal pain, nausea, vomiting or diarrhea.   Genitourinary: No urinary frequency, urgency, hesitancy or dysuria.   Musculoskeletal: No joint or muscle pain, no back pain, no recent trauma.    Neurologic: No headache, numbness, tingling or weakness.   Assessment/Plan:   ESRD, HD dependent  Ileus v partial SBO, NGT in place  Electrolyte abnormalitiy  Acute on chronic diastolic CHF  Pulmonary hypertension  Anemia of chronic disease  COPD  MICHAEL  Acute hypoxic and hypercapnic respiratory failure, resolved  Acute metabolic encephalopathy, resolved    Admitted to 3 N.  Cardiac monitoring next  Easy to chew for low phosphorus diet  NG tube out  Speech following  Hd per nephrology, patient refused this AM, counseled and she agreed later today  PT/OT  Auth in progress for SNF  Salivary cortisol due to cushingoid findings      I spent 40 minutes with the patient in face-to-face consultation, of which greater than 50% was spent in counseling and coordination of care as described above.    Case discussed with:  [x]Patient  []Family  []Nursing  []Case Management  DVT Prophylaxis:  []Lovenox  [x]Hep SQ  []SCDs  []Coumadin   []Eliquis/Xarelto     Objective:   VS: /67   Pulse 92   Temp 98.5 °F (36.9 °C) (Oral)   Resp 15   Ht 1.549 m (5' 1\")   Wt 98.1 kg (216 lb 4.3 oz)   SpO2 94%   BMI 40.86 kg/m²    Tmax/24hrs: Temp (24hrs), Av.4 °F (36.3 °C), Min:93.2 °F (34 °C), Max:98.9 °F (37.2 °C)  IOBRIEF  Intake/Output 
[facility]  Progress Note    Patient: Christy Donahue Age: 73 y.o. : 1950 MR#: [id number] SSN: [id number]  Date/Time: 2024     Subjective:   Patient doing well.  Has no new complaints.  Noticed leukocytosis on labs today.  Likely related to cortisol stimulation testing will obtain chest x-ray and blood cultures and a respiratory viral panel just in case.  Patient has no new symptoms again    Review of systems  General: No fevers or chills.  Cardiovascular: No chest pain or pressure. No palpitations.   Pulmonary: No shortness of breath, cough or wheeze.   Gastrointestinal: No abdominal pain, nausea, vomiting or diarrhea.   Genitourinary: No urinary frequency, urgency, hesitancy or dysuria.   Musculoskeletal: No joint or muscle pain, no back pain, no recent trauma.    Neurologic: No headache, numbness, tingling or weakness.   Assessment/Plan:   ESRD, HD dependent  Ileus v partial SBO, NGT in place  Electrolyte abnormalitiy  Acute on chronic diastolic CHF  Pulmonary hypertension  Anemia of chronic disease  COPD  MICHAEL  Acute hypoxic and hypercapnic respiratory failure, resolved  Acute metabolic encephalopathy, resolved     Admitted to 3 N.  Cardiac monitoring next  Easy to chew, low phosphorus diet  NG tube out  Speech following  Hd per nephrology, patient agreeable  PT/OT  Auth in progress for SNF  Cortisol level due to cushingoid findings, ACTH test tomorrow  Cardiology following, currently on amiodarone 200 mg oral. Metoprolol added for rate control. In NSR currently.  Regarding anticoagulation, patient is only on asa and heparin Dvt prophylaxis, given recent hemoglobin of 5.8 and history of GI bleed would not advocate for aggressive anticoagulation      I spent 40 minutes with the patient in face-to-face consultation, of which greater than 50% was spent in counseling and coordination of care as described above.     Case discussed with:  [x]Patient  []Family  
[facility]  Pulmonary, Critical Care, and Sleep Medicine    Daily Progress Note    Name: Christy Donahue MRN: [id number]   : 1950 Hospital: [facility]   Date: 2024        IMPRESSION:   Acute hypoxic respiratory failure: likely due to pulmonary edema.  COPD/Pulmonary emphysema: likely producing a small component of symptoms.  CHF with systolic, diastolic and valvular dysfunction.  Patient has had multiple weeks admissions for decompensated CHF and pulm edema.  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 Dillon. Noncompliant.  Chronic pain syndrome  Pulmonary nodule: 4.5 mm RLL nodule along with mediastinal adenopathy.  Hyponatremia  History of tobacco abuse: Quit in ; >50 pack-year history.  H/o Breast & Rectal Ca.  DM  ESRD on HD, MWF  H/o CVA     Patient Active Problem List   Diagnosis    Environmental allergies    MICHAEL on CPAP    Chronic pain syndrome    Osteoarthritis of both knees    Coronary artery disease involving native coronary artery of native heart without angina pectoris    Chronic obstructive pulmonary disease (HCC)    Bilateral shoulder pain    CAD (coronary artery disease)    Compliance with medication regimen    Chronic pain of both knees    Ischemic cardiomyopathy    Hyperlipidemia    Iron deficiency anemia    Former smoker    Moderate mitral valve regurgitation    Cardiomyopathy, dilated (HCC)    COPD with acute exacerbation (HCC)    Primary osteoarthritis of both knees    Decreased GFR    Hypertension    Breast cancer (HCC)    Rectal cancer (HCC)    History of myocardial infarction    Pulmonary hypertension (HCC)    CHF (congestive heart failure) (HCC)    CVA (cerebral vascular accident) (HCC)    End-stage renal disease on hemodialysis (HCC)    Stenosis of both vertebral arteries    Type 2 diabetes mellitus with hyperglycemia, with 
Mr. Lance Donahue, called and a voicemail left to return this nurse's phone call so he can be informed of the transfer to room 470.  
Nutrition Note    Pt admitted for CHF. S/p RRT 4/21 for chest pain, intubated and transferred to ICU. Dialysis per Nephrology. Pt was ordered for TF but were held due to KUB 4/23 impression obstruction vs ileus. Discussed care during interdisciplinary rounds. Plan for SBT, extubate to HFNC; CT abd to r/o ileus vs SBO, replace OGT with NGT. Noted pt was extubated. Will d/c TF order. Pt with negligible intake since intubated, unable to assess intake on floor. Nutritional needs are not being met.     Estimated Nutrition Needs: 77.1 kg, IBW 48 kg  1928 - 2313 kcals/day (25-30 kcal/kg)  77 - 93 g protein (1-1.2 g/kg)  500 - 1000 + UOP ml fluid (standard renal)    Nutrition Recommendations/Plan:   D/C TF order.  Continue NPO status for today.  Continue to monitor readiness for nutrition support, weight, labs, and plan of care during admission.      Electronically signed by Megan Dockweiler, MS, RD, CNSC on 4/24/2024 at 12:03 PM.    Contact: [phone]      
OT attempted. Pt NABILA for dialysis.    Will continue to follow.  
OT attempted. Pt NABILA for dialysis.    Will continue to follow.  
OT order received and chart reviewed.  Patient off the unit x1 attempt and having an ultrasound at bedside x1 attempt.  Will continue to follow and see patient when available/as appropriate.  Thank you for the referral.  Henrietta Hidalgo MS OTR/L  
Occupational Therapy  New OT order received and chart reviewed.  Patient evaluated and currently on skilled OT caseload.  Will acknowledge the order.      Thank you for the referral.   Radha CARLOS, OTR/L   
PT attempt x2. Patient was NABILA this morning for dialysis. Patient declines to participate in PT upon return from HD secondary to fatigue. Will continue to follow.   Sahara Funes PT, DPT  
PT attempt x2. This morning patient decline due to feeling unwell. Re-attempted in the afternoon and was receiving breathing treatment under respiratory care. Will continue to follow.   Sahara Funes PT, DPT  
PT. REFUSING NEBULIZER TX.'S.  
[facility] ANTIMICROBIAL STEWARDSHIP TEAM  PRESCRIBER COMMUNICATION FORM      We have briefly reviewed the antimicrobials  currently prescribed for your patient along with  the clinical indications and would like to Make the following recommendations:    DISCONTINUE ANTIBIOTICS amoxicillin/clavulanate      Rationale:    (  )  No evidence of bacterial infection    (  )  Microbiology report does not support use    (  )  Narrowing broad-spectrum empiric coverage    (  )  Therapeutic duplication: overlapping antimicrobial spectrum    (  )  Clinical situation dictates change    (x)  Prolonged duration of therapy not needed    (  )  Allergy/toxicity/drug interaction present    (  ) More clinically/cost effective therapy available  ADD ANTIBIOTICS  Rationale:        (  ) Microbiology report supports use    (  ) Expanded coverage needed: gm positive /negative / anaerobic /                               atypical    ( ) More clinicaly/cost effective therapy than current regimen    ( ) Needed for synergy    ( ) Double coverage needed    ( ) Less toxic therapy    ( ) Antifungal therapy needed  ADDITIONAL SUGGESTIONS    ( ) continue current therapy with the following change    ( ) additional laboratory testing    ( ) consider infectious disease consultation    ( ) consider outpatient IV therapy    ( ) other    COMMENTS: patient has been on antibiotics since 4/21 for aspiration pneumonia.  Please discontinue antibiotics if no clinical indication for prolonged therapy.  Thanks       This communication is not to be considered a consultation. You are under no obligation to follow these suggestions. A physician-patient relationship has not been established between the physician Completing this form and your patient. If a thorough analysis of the case is desired, please request an ID consultation.  
Patient did not go on Bipap until 1135 at her request. When she was relocated to a different room, she requested not to go back on Bipap.  
Patient is on room air at present and she refused Bipap tonight. No issues to report.  
Patient is room air. Patient is awake and alert. Tolerated breathing treatments well via mask. SpO2 97%, HR 82  
Patient placed on BIPAP for the night.   04/18/24 7327   NIV Type   Ventilator ID [id number]   NIV Started/Stopped On   Equipment Type v60   Mode Bilevel   Mask Type Full face mask   Mask Size Medium   Settings/Measurements   PIP Observed 16 cm H20   IPAP 18 cmH20   CPAP/EPAP 8 cmH2O   Vt (Measured) 772 mL   Rate Ordered 8   Insp Rise Time (%) 3 %   FiO2  35 %   Minute Volume (L/min) 7.4 Liters   Mask Leak (lpm) 32 lpm   Patient's Home Machine No   Alarm Settings   Alarms On Y   Low Pressure (cmH2O) 6 cmH2O   High Pressure (cmH2O) 40 cmH2O   Apnea (secs) 20 secs   RR Low (bpm) 8   RR High (bpm) 40 br/min       
Patient pulled her NGT out, re-inserted. KUB xray ordered  
Patient refusing blood sugar checks, refusing all medications, still refusing Oxygen, SPO2 sats currently 89% on room air.  Patient still stating \"Come on I told you to leave me alone\".   
Patient refusing breathing treatments from RT, still has nasal canula on top of head.  Refused to let RT get SPO2.  This nurse able to apply and finger SPO2 sensor and explained to the patient, if they are refusing to wear oxygen then this must stay intact at all times.  Patient telling this nurse \"Just leave me alone, do not touch me\".   
Patient refusing to keep neb treatment mask on and refusing oxygen. SpO2 96% on room air. Patient stated she wanted to rest. No respiratory distress or issues noted.   
Patient refusing to take all PO meds, refuses to eat food, keeps saying \"leave me alone, get out of here\" MD notified.  She also refuses assessment, she is alert, answer all orientation questions including her name \"I don't know.\"   Vital signs stable.  
Patient requested to go on Later than 10pm because she was watching a show on her phone.   
Patient seen and examined. Full consult to follow. Thank you for calling, Kam Champion MD, FACC.  
Patient started on spont breathing trial with PS 8, peep 5, 40%. Tolerating well    04/23/24 0753   Weaning Parameters   Respiratory Rate Observed 20   Ve 7.05      RSBI 48.57       
Patient still complaining of upset stomach and did not want to wear Bipap tonight.  
Patient taking off oxygen, refuses to let nursing staff put it back on.  This nurse put back on and patient snacked hand away.  This nurse explained that the patient just got out of the ICU and was intubated to protect their airway, and that this nurse did not want that to happen again.  Patient replied with \"Well if I stop breathing, then I stop\".  Will continue to frequently recheck patients SPO2, and attempt to replace oxygen.   
Patient was on phone when I arrived to place on Bipap.    
Physical Therapy    Pt not seen for skilled PT due to:    []  Nausea/vomiting  []  Eating  []  Pain  []  Pt lethargic  []  Off Unit  Other: Pt was transferred to ICU and is currently getting dialysis at bedside. Not medically appropriate for PT re-evaluation at this time. Will continue to follow.     Will f/u later as schedule allows. Thank you.  Joanne Biggs, PT, DPT    
Physical Therapy  Pt reported increased chest pain today after taking pain medication this morning. Pt reported not being up for therapy today and requested therapist reschedule to a different day. Will reschedule as schedule allows.    Signed,   Brent Mead, PT, DPT    
[facility] ANTIMICROBIAL STEWARDSHIP TEAM  PRESCRIBER COMMUNICATION FORM      We have briefly reviewed the antimicrobials  currently prescribed for your patient along with  the clinical indications and would like to Make the following recommendations:    DISCONTINUE ANTIBIOTICS azithromycin      Rationale:    (  )  No evidence of bacterial infection    (  )  Microbiology report does not support use    (  )  Narrowing broad-spectrum empiric coverage    (  )  Therapeutic duplication: overlapping antimicrobial spectrum    (  )  Clinical situation dictates change    (  )  Prolonged duration of therapy not needed    (x)  Allergy/toxicity/drug interaction present    (  ) More clinically/cost effective therapy available  ADD ANTIBIOTICS  Rationale:        (  ) Microbiology report supports use    (  ) Expanded coverage needed: gm positive /negative / anaerobic /                               atypical    ( ) More clinicaly/cost effective therapy than current regimen    ( ) Needed for synergy    ( ) Double coverage needed    ( ) Less toxic therapy    ( ) Antifungal therapy needed  ADDITIONAL SUGGESTIONS    ( ) continue current therapy with the following change    ( ) additional laboratory testing    ( ) consider infectious disease consultation    ( ) consider outpatient IV therapy    ( ) other    COMMENTS: Patient has completed 5 days of treatment recently recommended by pulmonologist on 4/14.  Thanks       This communication is not to be considered a consultation. You are under no obligation to follow these suggestions. A physician-patient relationship has not been established between the physician Completing this form and your patient. If a thorough analysis of the case is desired, please request an ID consultation.  
Pt declining to participate in OT skilled tx session d/t fatigue sp HD this date and pain in back 10/10-nursing notified. OT to follow.   
Pt discharged per order to [facility]. Report called. Pt taken via medical transport. All personal belongings and AVS sent with patient.   
Pt extubated successfully to High Flow 30LPM at 30Fio2. No signs of distres snoted. Will continue to monitor and assess    
Pt not seen for skilled PT due to:    []  Nausea/vomiting  []  Eating  []  Pain  []  Pt lethargic  [x]  Off Unit (1007 dialysis; 1317 dialysis)  Other:     Will f/u later as schedule allows. Thank you.  Elin Starks, PT     
Pt pulled out NGT. Perfect serve Dr. Mayorga. This is her second offense. She is not confused. She states. \" I do not like the NGT that is why I pulled the NGT. \"  1500pm Ok to leave NGT out for now. Continue to observe pt as per MD.  1616pm Attempt to give transfer report to [address] [place] [address]. RN not available. Will call back.  1704pm Transfer report given to LISETTE Sotelo-[address] [place] [address]. Transfer pt from Rm 315 to Rm 352. Transfer pt via bed. Hook pt to bedside and cardiac monitor.  
Pt reamins intubated but alert. Will began SBT    04/24/24 0728   Patient Observation   Pulse 68   Respirations 20   SpO2 99 %   Breath Sounds   Upper Airway Sounds Coarse   Right Upper Lobe Coarse crackles   Right Middle Lobe Coarse crackles   Right Lower Lobe Coarse crackles   Left Upper Lobe Coarse crackles   Left Lower Lobe Coarse crackles   Vent Information   Ventilator Day(s) 5   Ventilator ID [id number]   Vent Mode AC/PRVC   Ventilator Settings   FiO2  30 %   Insp Time (sec) 0.9 sec   Resp Rate (Set) 12 bpm   Target Vt 360   PEEP/CPAP (cmH2O) 5   Vent Patient Data (Readings)   Vt Mandatory Exp (mL) 343 mL   Peak Inspiratory Pressure (cmH2O) 23 cmH2O   Rate Measured 20 br/min   Minute Volume (L/min) 6.95 Liters   Mean Airway Pressure (cmH2O) 9.6 cmH20   Plateau Pressure (cm H2O) 19 cm H2O   Driving Pressure 14   Inspiratory Time 0.9 sec   I:E Ratio 1:3.8   Flow Sensitivity 3 L/min   Backup Apnea On   Backup Rate 12 Breaths Per Minute   Backup Vt 360   Backup I Time 1   Vent Alarm Settings   Low Pressure (cmH2O) 8.5 cmH2O   High Pressure (cmH2O) 45 cmH2O   Low Minute Volume (lpm) 2 L/min   High Minute Volume (lpm) 20 L/min   Low Exhaled Vt (ml) 200 mL   High Exhaled Vt (ml) 800 mL   RR High (bpm) 40 br/min   Apnea (secs) 20 secs   Additional Respiratoray Assessments   Humidification Source Heated wire   Humidification Temp 37.1   Circuit Condensation Drained   Ambu Bag With Mask At Bedside Yes   ETT    Placement Date/Time: 04/21/24 1728   Present on Admission/Arrival: No  Placed By: Licensed provider  Placement Verified By: Auscultation;Colorimetric ETCO2 device  Preoxygenation: Yes  Mask Ventilation: Ventilated by mask (1)  Technique: Video laryngo...   Secured At 25 cm   Measured From Lips   ETT Placement Right   Secured By Commercial tube schuler   Site Assessment Cool   Cuff Pressure 29 cm H2O   Tie/Schuler Changed No       
Pt refusing to wear Bipap tonight. Pt educated the importance of Bipap.   
Pt tolerating SBT well will continue to monitor and assess   04/24/24 5820   B: Both Spontaneous Awakening and Breathing Trials   Did Patient Receive Sedative and/or Opioid IV Medications in the Last 24 Hours No   Was Patient Receiving Mechanical Ventilation Yes   Safety Screening Spontaneous Breathing Trial (SBT) Proceed with SBT - no exclusion criteria met   RT Notified Ready for SBT Yes   Spontaneous  mL   Spontaneous Breathing Trial (SBT) Outcome SBT Passed   Patient Meet Extubation Criteria Yes   Weaning Parameters   Spontaneous Breathing Trial Complete Yes   Respiratory Rate Observed 21   Ve 9.7      RSBI 21.67       
RRT called for sob  Called by primary team  CXR reviewed  Will need emergent dialysis  Orders placed  Still makes urine. I placed orders for diuril plus bumex to maximize diuresis  Dialysis nurse on the way    
Rapid called patient sob with chest pain rapid called patient heart rate increased with her pain then patient became unconscious, RT got ABG results are     Latest Reference Range & Units 04/21/24 16:53   POC pH 7.35 - 7.45   7.14 (LL)   POC pCO2 35.0 - 45.0 MMHG 55.4 (H)   POC PO2 80 - 100 MMHG 223 (H)   POC HCO3 22 - 26 MMOL/L 18.7 (L)   POC O2 SAT 92 - 97 % 99.5 (H)   (LL): Data is critically low  (H): Data is abnormally high  (L): Data is abnormally low  Patient intubated and sent to ICU  
Received patient from ED, in room 364, Aox4, tachycardic, other VSS, labored breathing, BIPAP on,  having dialysis at this time.  Unable to complete admission questions at this time. Will follow up later.     
Received pre HD report from  ADALID Gaytan RN.        Pt in bed, A+O x4, on O2 via NC @ 2L, amiodarone infusing via PIV, no s/s of distress noted.      Accessed AVF Left upper arm w/15G needle w/o difficulty.    Tx initiated at 1316.      AVF flowing with ease.  For hemodynamic stability UF goal 2500 ml.      Offered assistance with repositioning every 2 hours.  Vascular access visible at all times throughout entire duration of treatment and line connections remain intact throughout entire duration of treatment.     Nephrologist bedside gave verbal order to increase UF goal to 3L net.      Tx completed at 1620, tolerated well 3L removed.  De-accessed per protocol.    Clot time 5 minutes for arterial, and 5 minutes for venous.      Unit nurse ADALID Gaytan, RN given report.   
Received pre HD report from  ADALID Singleton RN.        Pt in bed, A+O x4, on O2 via NC @ 2L O2sat @ 99%, c/o back pain 10/10, tylenol given per MAR.      Accessed AVF Left upper arm w/15G needle w/o difficulty.    Tx initiated at 0929.      AVF flowing with ease.  For hemodynamic stability UF goal 2500 ml.      Offered assistance with repositioning every 2 hours.  Vascular access visible at all times throughout entire duration of treatment and line connections remain intact throughout entire duration of treatment.     @0929, patient c/o feeling nauseous, Zofran given per MAR.      Tx completed at 1223, tolerated well 2L removed.  De-accessed per protocol.    Clot time 5 minutes for arterial, and 5 minutes for venous.      Unit nurse ADALID Singleton, LISETTE given report.   
Received pre HD report from  DB Velazquez RN.        Pt in bed, A+O x4, no s/s of distress noted.      Accessed AVF Left upper arm w/15G needle w/o difficulty.    Tx initiated at 1120.      AVF flowing with ease.  For hemodynamic stability UF goal 2500 ml.      Offered assistance with repositioning every 2 hours.  Vascular access visible at all times throughout entire duration of treatment and line connections remain intact throughout entire duration of treatment.     Tx completed at 1322, tolerated well 2L removed.  De-accessed per protocol.    Clot time 5 minutes for arterial, and 5 minutes for venous.      Unit nurse DB Velazquez, LISETTE given report.   
Refusing bipap   
TRANSFER - IN REPORT:    Verbal report received from Glenny Naylor RN on Christy Donahue  being received from [facility] for routine progression of patient care      Report consisted of patient's Situation, Background, Assessment and   Recommendations(SBAR).     Information from the following report(s) Nurse Handoff Report, MAR, and Recent Results was reviewed with the receiving nurse.    Opportunity for questions and clarification was provided.      Assessment completed upon patient's arrival to unit and care assumed.    
TRANSFER - OUT REPORT:    Verbal report given to LISETTE Hooper on Christy Donahue  being transferred to [facility] for routine progression of patient care       Report consisted of patient's Situation, Background, Assessment and   Recommendations(SBAR).     Information from the following report(s) Nurse Handoff Report was reviewed with the receiving nurse.           Lines:   Peripheral IV 04/26/24 Posterior;Right Forearm (Active)   Site Assessment Clean, dry & intact 05/01/24 1845   Line Status Normal saline locked;Capped 05/01/24 1845   Line Care Cap changed 05/01/24 1845   Phlebitis Assessment No symptoms 05/01/24 1845   Infiltration Assessment 0 05/01/24 1845   Alcohol Cap Used Yes 05/01/24 1845   Dressing Status Clean, dry & intact 05/01/24 1845   Dressing Type Transparent 05/01/24 1845        Opportunity for questions and clarification was provided.      Patient transported with:  O2 @ 2lpm       
Today patient weaned on PS 5, PEEP 8 45% for 10 hours and tolerated well. Today advanced ETT 2cm. Placed patient back on full vent support to rest for the evening and night. Plan is to continue wean tomorrow as tolerated with a goal to extubate.   
V60 Bipap in room on standby, pt refusing to wear tonight, RN notified  
VENTILATOR CARE PLAN    Problem: Ventilator Management  Goal: *Adequate oxygenation/ ventilation/ and extubation      Patient:        Christy Donahue     73 y.o.   female     4/24/2024  4:13 AM  Patient Active Problem List   Diagnosis    Environmental allergies    MICHAEL on CPAP    Chronic pain syndrome    Osteoarthritis of both knees    Coronary artery disease involving native coronary artery of native heart without angina pectoris    Chronic obstructive pulmonary disease (HCC)    Bilateral shoulder pain    CAD (coronary artery disease)    Compliance with medication regimen    Chronic pain of both knees    Ischemic cardiomyopathy    Hyperlipidemia    Iron deficiency anemia    Former smoker    Moderate mitral valve regurgitation    Cardiomyopathy, dilated (HCC)    COPD with acute exacerbation (HCC)    Primary osteoarthritis of both knees    Decreased GFR    Hypertension    Breast cancer (HCC)    Rectal cancer (HCC)    History of myocardial infarction    Pulmonary hypertension (HCC)    CHF (congestive heart failure) ([facility])    CVA (cerebral vascular accident) ([facility])    End-stage renal disease on hemodialysis ([facility])    Stenosis of both vertebral arteries    Type 2 diabetes mellitus with hyperglycemia, with long-term current use of insulin ([facility])    Chronic heart failure with preserved ejection fraction ([facility])    Anemia due to chronic kidney disease, on chronic dialysis ([facility])    S/P exploratory laparotomy    Colostomy in place ([facility])    Perforated diverticulum of large intestine    Abdominal adhesions    Valvular cardiomyopathy ([facility])    Personal history of tobacco use, presenting hazards to health    Moderate COPD (chronic obstructive pulmonary disease) (HCC)    MICHAEL (obstructive sleep apnea)    Mild protein-calorie malnutrition ([facility])    Anemia, macrocytic    CHF (congestive heart failure), NYHA class I, acute on chronic, combined (HCC)    Cardiomyopathy (HCC)    Left foot pain    Chronic hypoxic respiratory failure (HCC)    Chronic 
(CHOLECALCIFEROL) tablet 2,000 Units  2,000 Units Oral Daily    ezetimibe (ZETIA) tablet 10 mg  10 mg Oral Daily    folic acid (FOLVITE) tablet 1 mg  1 mg Oral Daily    gabapentin (NEURONTIN) capsule 300 mg  300 mg Oral QPM    glucose chewable tablet 16 g  4 tablet Oral PRN    dextrose bolus 10% 125 mL  125 mL IntraVENous PRN    Or    dextrose bolus 10% 250 mL  250 mL IntraVENous PRN    glucagon (rDNA) injection 1 mg  1 mg SubCUTAneous PRN    dextrose 10 % infusion   IntraVENous Continuous PRN    pantoprazole (PROTONIX) tablet 40 mg  40 mg Oral Daily    sevelamer (RENVELA) tablet 800 mg  800 mg Oral TID     vitamin B-12 (CYANOCOBALAMIN) tablet 1,000 mcg  1,000 mcg Oral Daily    sodium chloride flush 0.9 % injection 5-40 mL  5-40 mL IntraVENous 2 times per day    sodium chloride flush 0.9 % injection 5-40 mL  5-40 mL IntraVENous PRN    0.9 % sodium chloride infusion   IntraVENous PRN    ondansetron (ZOFRAN-ODT) disintegrating tablet 4 mg  4 mg Oral Q8H PRN    Or    ondansetron (ZOFRAN) injection 4 mg  4 mg IntraVENous Q6H PRN    melatonin tablet 3 mg  3 mg Oral Nightly PRN    polyethylene glycol (GLYCOLAX) packet 17 g  17 g Oral Daily PRN    acetaminophen (TYLENOL) tablet 650 mg  650 mg Oral Q6H PRN    Or    acetaminophen (TYLENOL) suppository 650 mg  650 mg Rectal Q6H PRN    insulin glargine (LANTUS) injection vial 15 Units  15 Units SubCUTAneous Nightly    insulin lispro (HUMALOG) injection vial 0-8 Units  0-8 Units SubCUTAneous TID     insulin lispro (HUMALOG) injection vial 0-4 Units  0-4 Units SubCUTAneous Nightly    metoprolol (LOPRESSOR) injection 5 mg  5 mg IntraVENous Q20 MIN PRN    midodrine (PROAMATINE) tablet 10 mg  10 mg Oral TID       Review of Symptoms: comprehensive ROS negative except above.   Objective:   Patient Vitals for the past 24 hrs:   Temp Pulse Resp BP SpO2   04/13/24 0945 -- -- -- -- 96 %   04/13/24 0804 97.5 °F (36.4 °C) 73 16 (!) 140/50 100 %   04/13/24 0615 -- 77 -- (!) 138/59 100 % 
06/27/2022    Breast cancer (HCC) 06/26/2022    Rectal cancer (HCC) 06/26/2022    Moderate mitral valve regurgitation 08/13/2020    GERD without esophagitis 07/09/2020    Lesion of ulnar nerve 07/09/2020    Thickened endometrium 07/09/2020    Gout 11/14/2019    COPD with acute exacerbation (HCC) 10/19/2019    CAD (coronary artery disease) 10/18/2019    Ischemic cardiomyopathy 09/09/2019    MICHAEL on CPAP 08/26/2019    Type 2 diabetes mellitus with hyperglycemia, with long-term current use of insulin (HCC) 07/12/2018    Coronary artery disease involving native coronary artery of native heart without angina pectoris 05/31/2018    Bilateral shoulder pain 09/27/2016    Chronic obstructive pulmonary disease (HCC) 05/17/2016    Compliance with medication regimen 05/17/2016    Chronic pain syndrome 04/13/2016    Chronic pain of both knees 04/13/2016    Primary osteoarthritis of both knees 04/13/2016    Hyperlipidemia 10/22/2015    Iron deficiency anemia 10/22/2015    Former smoker 10/22/2015    Decreased GFR 10/22/2015    Osteoarthritis of both knees     Hypertension 09/17/2015    Environmental allergies 04/01/2015    Cardiomyopathy, dilated (HCC) 12/31/2014    Stented coronary artery 12/31/2014          Plan:   participated and listen to the recommendations of the IDR team.    Patient intubated, follow commands and is a dialysis patient. Possibility of extubation today.    Chaplains will continue to follow and will provide pastoral care on an as needed/requested basis.     recommends bedside caregivers page  on duty if patient shows signs of acute spiritual or emotional distress.     Omari Wheatland  Staff   Spiritual Health   [phone]       
5,000 Units SubCUTAneous 3 times per day    [Held by provider] HYDROcodone-chlorpheniramine (TUSSIONEX) 10-8 MG/5ML oral suspension 5 mL  5 mL Oral Q12H PRN    perflutren lipid microspheres (DEFINITY) injection 2 mL  2 mL IntraVENous ONCE PRN    [Held by provider] phenol 1.4 % mouth spray 1 spray  1 spray Mouth/Throat Q2H PRN    [Held by provider] oxyCODONE-acetaminophen (PERCOCET) 5-325 MG per tablet 1 tablet  1 tablet Oral Q4H PRN    naloxone (NARCAN) injection 0.4 mg  0.4 mg IntraVENous PRN    [Held by provider] benzonatate (TESSALON) capsule 100 mg  100 mg Oral TID PRN    [Held by provider] guaiFENesin (MUCINEX) extended release tablet 600 mg  600 mg Oral BID    arformoterol tartrate (BROVANA) nebulizer solution 15 mcg  15 mcg Nebulization BID RT    budesonide (PULMICORT) nebulizer suspension 1 mg  1 mg Nebulization BID RT    allopurinol (ZYLOPRIM) tablet 100 mg  100 mg Oral Once per day on Mon Wed Fri    aspirin chewable tablet 81 mg  81 mg Oral Daily    atorvastatin (LIPITOR) tablet 80 mg  80 mg Oral Nightly    Vitamin D (CHOLECALCIFEROL) tablet 2,000 Units  2,000 Units Oral Daily    ezetimibe (ZETIA) tablet 10 mg  10 mg Oral Daily    folic acid (FOLVITE) tablet 1 mg  1 mg Oral Daily    gabapentin (NEURONTIN) capsule 300 mg  300 mg Oral QPM    glucose chewable tablet 16 g  4 tablet Oral PRN    dextrose bolus 10% 125 mL  125 mL IntraVENous PRN    Or    dextrose bolus 10% 250 mL  250 mL IntraVENous PRN    glucagon (rDNA) injection 1 mg  1 mg SubCUTAneous PRN    dextrose 10 % infusion   IntraVENous Continuous PRN    sevelamer (RENVELA) tablet 800 mg  800 mg Oral TID WC    vitamin B-12 (CYANOCOBALAMIN) tablet 1,000 mcg  1,000 mcg Oral Daily    sodium chloride flush 0.9 % injection 5-40 mL  5-40 mL IntraVENous 2 times per day    sodium chloride flush 0.9 % injection 5-40 mL  5-40 mL IntraVENous PRN    0.9 % sodium chloride infusion   IntraVENous PRN    ondansetron (ZOFRAN-ODT) disintegrating tablet 4 mg  4 mg Oral 
BUN/Cr 55/4.59, GFR 10, Phos 6.8 H Wound Type: None       Current Nutrition Intake & Therapies:    Average Meal Intake:  (PO diet just advanced)  Average Supplements Intake: None Ordered  ADULT DIET; Easy to Chew    Anthropometric Measures:  Height: 154.9 cm (5' 1\")  Ideal Body Weight (IBW): 105 lbs (48 kg)    Admission Body Weight: 77.1 kg (170 lb)  Current Body Weight: 88.7 kg (195 lb 8.8 oz), 167.1 % IBW. Weight Source: Bed Scale  Current BMI (kg/m2): 37  BMI Categories: Obese Class 2 (BMI 35.0 -39.9)    Estimated Daily Nutrient Needs:  Energy Requirements Based On: Kcal/kg  Weight Used for Energy Requirements: Admission  Energy (kcal/day): 0550-5233 (25-30 - dialysis)  Weight Used for Protein Requirements: Admission  Protein (g/day): 77-93 (1-1.2)  Method Used for Fluid Requirements: Standard Renal  Fluid (ml/day): 500-1000 + UOP    Nutrition Diagnosis:   Increased nutrient needs related to increase demand for energy/nutrients as evidenced by dialysis  Inadequate protein-energy intake related to altered GI function, impaired respiratory function, increase demand for energy/nutrients as evidenced by dialysis (s/p ext, concern for ileus, PO diet just advanced)    Nutrition Interventions:   Food and/or Nutrient Delivery: Modify Current Diet, Start Oral Nutrition Supplement  Nutrition Education/Counseling: No recommendation at this time  Coordination of Nutrition Care: Continue to monitor while inpatient  Plan of Care discussed with: RN    Goals:  Previous Goal Met: No Progress toward Goal(s)  Goals: Meet at least 75% of estimated needs, by next RD assessment       Nutrition Monitoring and Evaluation:   Behavioral-Environmental Outcomes: None Identified  Food/Nutrient Intake Outcomes: Enteral Nutrition Intake/Tolerance  Physical Signs/Symptoms Outcomes: Biochemical Data, Chewing or Swallowing, GI Status, Nausea or Vomiting, Fluid Status or Edema, Hemodynamic Status, Meal Time Behavior, Weight, Skin    Discharge 
Generalized  Edema Generalized: +1 Pertinent Meds: amiodarone, liptior, folic acid, gabapentin, lantus, humalog, solumedrol, glycolax, renvela, virt-caps, B12, vit d Pertinent Labs: POC Glucose 320-333 mg/dl x 24 hrs, Na 126 L, BUN/Cr 91/6.45, GFR 6 Wound Type: None       Current Nutrition Intake & Therapies:    Average Meal Intake: 26-50%, 1-25%  Average Supplements Intake: %  ADULT DIET; Easy to Chew; No Added Salt (3-4 gm); Low Phosphorus (Less than 1000 mg)  ADULT ORAL NUTRITION SUPPLEMENT; Breakfast, Dinner; Renal Oral Supplement    Anthropometric Measures:  Height: 154.9 cm (5' 1\")  Ideal Body Weight (IBW): 105 lbs (48 kg)    Admission Body Weight: 77.1 kg (170 lb)  Current Body Weight: 84.1 kg (185 lb 6.5 oz) (taken by RD), 167.1 % IBW. Weight Source: Bed Scale  Current BMI (kg/m2): 35.1  BMI Categories: Obese Class 2 (BMI 35.0 -39.9)    Estimated Daily Nutrient Needs:  Energy Requirements Based On: Kcal/kg  Weight Used for Energy Requirements: Admission  Energy (kcal/day): 7523-3654 (25-30 - dialysis)  Weight Used for Protein Requirements: Admission  Protein (g/day): 77-93 (1-1.2)  Method Used for Fluid Requirements: Standard Renal  Fluid (ml/day): 500-1000 + UOP    Nutrition Diagnosis:   Increased nutrient needs related to increase demand for energy/nutrients as evidenced by dialysis  Inadequate protein-energy intake related to altered GI function, impaired respiratory function, increase demand for energy/nutrients as evidenced by intake 26-50%, intake 51-75% (s/p ext, concern for ileus, improving on PO per pt)    Nutrition Interventions:   Food and/or Nutrient Delivery: Modify Current Diet, Continue Oral Nutrition Supplement  Nutrition Education/Counseling: No recommendation at this time  Coordination of Nutrition Care: Continue to monitor while inpatient  Plan of Care discussed with: pt    Goals:  Previous Goal Met: Progressing toward Goal(s)  Goals: Meet at least 75% of estimated needs, by next RD 
Moderate   Sputum Color/Odor Brown;[place]   Sputum Consistency Thick   ETT    Placement Date/Time: 04/21/24 1728   Present on Admission/Arrival: No  Placed By: Licensed provider  Placement Verified By: Auscultation;Colorimetric ETCO2 device  Preoxygenation: Yes  Mask Ventilation: Ventilated by mask (1)  Technique: Video laryngo...   $ Intubation Emergent  $ Yes  (Simultaneous filing. User may not have seen previous data.)   Secured At 21 cm  (Simultaneous filing. User may not have seen previous data.)   Measured From Lips  (Simultaneous filing. User may not have seen previous data.)   ETT Placement Right  (Simultaneous filing. User may not have seen previous data.)   Secured By Commercial tube schuler  (Simultaneous filing. User may not have seen previous data.)   Site Assessment Cool;Dry  (Simultaneous filing. User may not have seen previous data.)   Cuff Pressure 28 cm H2O  (Simultaneous filing. User may not have seen previous data.)   Tie/Schuler Changed Yes   Ventilator Associated Pneumonia Bundle   Elevation of Head of Bed to 30-45 Degrees  Yes   Oral Suctioning Yes   ETT/Trach Suctioning Yes   Skin Assessment   Skin Assessment Clean, dry, & intact   Skin Protection for O2 Device Yes   Orientation Anterior;Bilateral   Location Cheek;Lip;Tongue   Interventions Reposition device   Respiratory   Respiratory (WDL) X   Respiratory Interventions H.O.B. elevated;Suction - ETT;Suction - oral     Airway pressures very high due to abdominal distension and HOB flat during OG and monitor placement by RN's. Will monitor airway pressures closely. ABG to follow.  
Nebulization BID RT    budesonide (PULMICORT) nebulizer suspension 1 mg  1 mg Nebulization BID RT    allopurinol (ZYLOPRIM) tablet 100 mg  100 mg Oral Once per day on Mon Wed Fri    aspirin chewable tablet 81 mg  81 mg Oral Daily    atorvastatin (LIPITOR) tablet 80 mg  80 mg Oral Nightly    Vitamin D (CHOLECALCIFEROL) tablet 2,000 Units  2,000 Units Oral Daily    ezetimibe (ZETIA) tablet 10 mg  10 mg Oral Daily    folic acid (FOLVITE) tablet 1 mg  1 mg Oral Daily    gabapentin (NEURONTIN) capsule 300 mg  300 mg Oral QPM    glucose chewable tablet 16 g  4 tablet Oral PRN    dextrose bolus 10% 125 mL  125 mL IntraVENous PRN    Or    dextrose bolus 10% 250 mL  250 mL IntraVENous PRN    glucagon (rDNA) injection 1 mg  1 mg SubCUTAneous PRN    dextrose 10 % infusion   IntraVENous Continuous PRN    pantoprazole (PROTONIX) tablet 40 mg  40 mg Oral Daily    sevelamer (RENVELA) tablet 800 mg  800 mg Oral TID     vitamin B-12 (CYANOCOBALAMIN) tablet 1,000 mcg  1,000 mcg Oral Daily    sodium chloride flush 0.9 % injection 5-40 mL  5-40 mL IntraVENous 2 times per day    sodium chloride flush 0.9 % injection 5-40 mL  5-40 mL IntraVENous PRN    0.9 % sodium chloride infusion   IntraVENous PRN    ondansetron (ZOFRAN-ODT) disintegrating tablet 4 mg  4 mg Oral Q8H PRN    Or    ondansetron (ZOFRAN) injection 4 mg  4 mg IntraVENous Q6H PRN    melatonin tablet 3 mg  3 mg Oral Nightly PRN    polyethylene glycol (GLYCOLAX) packet 17 g  17 g Oral Daily PRN    acetaminophen (TYLENOL) tablet 650 mg  650 mg Oral Q6H PRN    Or    acetaminophen (TYLENOL) suppository 650 mg  650 mg Rectal Q6H PRN    insulin glargine (LANTUS) injection vial 15 Units  15 Units SubCUTAneous Nightly    insulin lispro (HUMALOG) injection vial 0-8 Units  0-8 Units SubCUTAneous TID     insulin lispro (HUMALOG) injection vial 0-4 Units  0-4 Units SubCUTAneous Nightly    metoprolol (LOPRESSOR) injection 5 mg  5 mg IntraVENous Q20 MIN PRN    midodrine (PROAMATINE) 
RT    allopurinol (ZYLOPRIM) tablet 100 mg  100 mg Oral Once per day on Mon Wed Fri    aspirin chewable tablet 81 mg  81 mg Oral Daily    atorvastatin (LIPITOR) tablet 80 mg  80 mg Oral Nightly    Vitamin D (CHOLECALCIFEROL) tablet 2,000 Units  2,000 Units Oral Daily    ezetimibe (ZETIA) tablet 10 mg  10 mg Oral Daily    folic acid (FOLVITE) tablet 1 mg  1 mg Oral Daily    gabapentin (NEURONTIN) capsule 300 mg  300 mg Oral QPM    glucose chewable tablet 16 g  4 tablet Oral PRN    dextrose bolus 10% 125 mL  125 mL IntraVENous PRN    Or    dextrose bolus 10% 250 mL  250 mL IntraVENous PRN    glucagon (rDNA) injection 1 mg  1 mg SubCUTAneous PRN    dextrose 10 % infusion   IntraVENous Continuous PRN    pantoprazole (PROTONIX) tablet 40 mg  40 mg Oral Daily    sevelamer (RENVELA) tablet 800 mg  800 mg Oral TID WC    vitamin B-12 (CYANOCOBALAMIN) tablet 1,000 mcg  1,000 mcg Oral Daily    sodium chloride flush 0.9 % injection 5-40 mL  5-40 mL IntraVENous 2 times per day    sodium chloride flush 0.9 % injection 5-40 mL  5-40 mL IntraVENous PRN    0.9 % sodium chloride infusion   IntraVENous PRN    ondansetron (ZOFRAN-ODT) disintegrating tablet 4 mg  4 mg Oral Q8H PRN    Or    ondansetron (ZOFRAN) injection 4 mg  4 mg IntraVENous Q6H PRN    melatonin tablet 3 mg  3 mg Oral Nightly PRN    polyethylene glycol (GLYCOLAX) packet 17 g  17 g Oral Daily PRN    acetaminophen (TYLENOL) tablet 650 mg  650 mg Oral Q6H PRN    Or    acetaminophen (TYLENOL) suppository 650 mg  650 mg Rectal Q6H PRN    insulin glargine (LANTUS) injection vial 15 Units  15 Units SubCUTAneous Nightly    insulin lispro (HUMALOG) injection vial 0-8 Units  0-8 Units SubCUTAneous TID WC    insulin lispro (HUMALOG) injection vial 0-4 Units  0-4 Units SubCUTAneous Nightly    metoprolol (LOPRESSOR) injection 5 mg  5 mg IntraVENous Q20 MIN PRN    midodrine (PROAMATINE) tablet 10 mg  10 mg Oral TID       Review of Symptoms: comprehensive ROS negative except above. 
anemia    Acquired arteriovenous aneurysm (HCC)    Gout    Dialysis patient (HCC)    GERD without esophagitis    Lesion of ulnar nerve    Secondary hyperparathyroidism of renal origin (HCC)    Smoking greater than 20 pack years    Stented coronary artery    Thickened endometrium    Ulnar neuropathy at elbow of left upper extremity    Vitamin D deficiency    End stage renal disease (HCC)    Acute respiratory failure with hypoxia (HCC)    COPD exacerbation (HCC)       COPD exacerbation (HCC) [J44.1]  End stage renal disease on dialysis (HCC) [N18.6, Z99.2]  Acute respiratory failure with hypoxia (HCC) [J96.01]    Reason patient intubated: Respiratory Failure     Ventilator day: 4    Ventilator settings: PRVC 12/360/+8/35    ETT Size/Placement: 7.5/25 lips     Additional Comments: Pt tolerated SBT trial with PS 5. Placed on vent full support for the night to rest.    PLAN OF CARE: Continue mechanical ventilation and wean as tolerated.        GOAL: Extubate        ABG:  Date:4/23/2024  7.44/40/78/27/96%    Chest X-ray:  Date:4[facility]23/2024  [id number]    Lab Test:  Date:4/23/2024  WBC:   Lab Results   Component Value Date/Time    WBC 15.0 04/23/2024 03:52 AM   HGB:   Lab Results   Component Value Date/Time    HGB 5.7 04/23/2024 03:52 AM    PLTS:   Lab Results   Component Value Date/Time     04/23/2024 03:52 AM         Vital Signs:   Patient Vitals for the past 8 hrs:   Temp Pulse Resp BP SpO2   04/23/24 0415 -- 76 18 (!) 117/42 96 %   04/23/24 0400 99.1 °F (37.3 °C) 77 20 (!) 97/45 96 %   04/23/24 0331 -- 73 19 -- 100 %   04/23/24 0330 -- 73 19 (!) 106/48 99 %   04/23/24 0322 -- 72 19 -- 99 %   04/23/24 0315 -- 76 19 (!) 107/44 99 %   04/23/24 0300 -- 74 20 (!) 121/46 --   04/23/24 0245 -- 75 19 (!) 110/45 99 %   04/23/24 0230 -- 79 19 (!) 109/44 99 %   04/23/24 0215 -- 80 18 (!) 112/49 98 %   04/23/24 0200 -- 88 18 (!) 96/48 --   04/23/24 0145 -- 84 19 (!) 106/43 96 %   04/23/24 0130 -- 82 19 (!) 
(ZYLOPRIM) tablet 100 mg  100 mg Oral Once per day on Mon Wed Fri    aspirin chewable tablet 81 mg  81 mg Oral Daily    atorvastatin (LIPITOR) tablet 80 mg  80 mg Oral Nightly    Vitamin D (CHOLECALCIFEROL) tablet 2,000 Units  2,000 Units Oral Daily    ezetimibe (ZETIA) tablet 10 mg  10 mg Oral Daily    folic acid (FOLVITE) tablet 1 mg  1 mg Oral Daily    gabapentin (NEURONTIN) capsule 300 mg  300 mg Oral QPM    glucose chewable tablet 16 g  4 tablet Oral PRN    dextrose bolus 10% 125 mL  125 mL IntraVENous PRN    Or    dextrose bolus 10% 250 mL  250 mL IntraVENous PRN    glucagon (rDNA) injection 1 mg  1 mg SubCUTAneous PRN    dextrose 10 % infusion   IntraVENous Continuous PRN    pantoprazole (PROTONIX) tablet 40 mg  40 mg Oral Daily    sevelamer (RENVELA) tablet 800 mg  800 mg Oral TID WC    traMADol (ULTRAM) tablet 50 mg  50 mg Oral Q12H PRN    vitamin B-12 (CYANOCOBALAMIN) tablet 1,000 mcg  1,000 mcg Oral Daily    sodium chloride flush 0.9 % injection 5-40 mL  5-40 mL IntraVENous 2 times per day    sodium chloride flush 0.9 % injection 5-40 mL  5-40 mL IntraVENous PRN    0.9 % sodium chloride infusion   IntraVENous PRN    ondansetron (ZOFRAN-ODT) disintegrating tablet 4 mg  4 mg Oral Q8H PRN    Or    ondansetron (ZOFRAN) injection 4 mg  4 mg IntraVENous Q6H PRN    melatonin tablet 3 mg  3 mg Oral Nightly PRN    polyethylene glycol (GLYCOLAX) packet 17 g  17 g Oral Daily PRN    acetaminophen (TYLENOL) tablet 650 mg  650 mg Oral Q6H PRN    Or    acetaminophen (TYLENOL) suppository 650 mg  650 mg Rectal Q6H PRN    insulin glargine (LANTUS) injection vial 15 Units  15 Units SubCUTAneous Nightly    insulin lispro (HUMALOG) injection vial 0-8 Units  0-8 Units SubCUTAneous TID WC    insulin lispro (HUMALOG) injection vial 0-4 Units  0-4 Units SubCUTAneous Nightly    methylPREDNISolone sodium succ (SOLU-MEDROL) 40 mg in sterile water 1 mL injection  40 mg IntraVENous Q8H    metoprolol (LOPRESSOR) injection 5 mg  5 mg 
0.9 % injection 5-40 mL  5-40 mL IntraVENous 2 times per day    sodium chloride flush 0.9 % injection 5-40 mL  5-40 mL IntraVENous PRN    0.9 % sodium chloride infusion   IntraVENous PRN    ondansetron (ZOFRAN-ODT) disintegrating tablet 4 mg  4 mg Oral Q8H PRN    Or    ondansetron (ZOFRAN) injection 4 mg  4 mg IntraVENous Q6H PRN    [Held by provider] melatonin tablet 3 mg  3 mg Oral Nightly PRN    polyethylene glycol (GLYCOLAX) packet 17 g  17 g Oral Daily PRN    acetaminophen (TYLENOL) tablet 650 mg  650 mg Oral Q6H PRN    Or    acetaminophen (TYLENOL) suppository 650 mg  650 mg Rectal Q6H PRN    insulin glargine (LANTUS) injection vial 15 Units  15 Units SubCUTAneous Nightly       Review of Symptoms: comprehensive ROS negative except above.   Objective:   Patient Vitals for the past 24 hrs:   Temp Pulse Resp BP SpO2   04/24/24 0728 -- 68 20 -- 99 %   04/24/24 0700 -- 69 17 (!) 133/52 99 %   04/24/24 0630 -- 71 20 -- 99 %   04/24/24 0615 -- 72 21 -- 99 %   04/24/24 0600 -- 76 21 (!) 137/52 99 %   04/24/24 0530 -- 81 19 (!) 145/57 99 %   04/24/24 0515 -- 81 20 (!) 151/60 99 %   04/24/24 0500 -- 78 21 (!) 152/59 99 %   04/24/24 0430 -- 79 21 (!) 146/54 98 %   04/24/24 0415 -- 78 20 (!) 142/54 99 %   04/24/24 0400 99 °F (37.2 °C) 77 20 (!) 139/49 98 %   04/24/24 0345 -- 82 24 (!) 140/51 99 %   04/24/24 0330 -- 81 23 (!) 138/57 100 %   04/24/24 0326 -- 75 18 -- 99 %   04/24/24 0323 -- 76 21 -- 98 %   04/24/24 0315 -- 82 19 (!) 136/52 100 %   04/24/24 0300 -- 75 21 (!) 139/53 98 %   04/24/24 0245 -- 75 20 (!) 139/54 99 %   04/24/24 0230 -- 87 21 (!) 137/51 100 %   04/24/24 0215 -- 81 18 (!) 145/51 99 %   04/24/24 0213 -- -- 27 -- --   04/24/24 0200 -- 79 20 (!) 127/47 98 %   04/24/24 0145 -- 77 20 (!) 127/51 98 %   04/24/24 0130 -- 80 20 (!) 131/50 98 %   04/24/24 0115 -- 83 19 (!) 132/51 99 %   04/24/24 0100 -- 81 20 (!) 136/40 98 %   04/24/24 0045 -- 81 19 (!) 135/44 98 %   04/24/24 0030 -- 88 22 (!) 138/48 98 % 
IntraVENous 2 times per day    sodium chloride flush 0.9 % injection 5-40 mL  5-40 mL IntraVENous PRN    0.9 % sodium chloride infusion   IntraVENous PRN    ondansetron (ZOFRAN-ODT) disintegrating tablet 4 mg  4 mg Oral Q8H PRN    Or    ondansetron (ZOFRAN) injection 4 mg  4 mg IntraVENous Q6H PRN    [Held by provider] melatonin tablet 3 mg  3 mg Oral Nightly PRN    acetaminophen (TYLENOL) tablet 650 mg  650 mg Oral Q6H PRN    Or    acetaminophen (TYLENOL) suppository 650 mg  650 mg Rectal Q6H PRN    insulin glargine (LANTUS) injection vial 15 Units  15 Units SubCUTAneous Nightly       Review of Symptoms: comprehensive ROS negative except above.   Objective:   Patient Vitals for the past 24 hrs:   Temp Pulse Resp BP SpO2   04/27/24 0742 -- 94 18 -- 96 %   04/27/24 0732 98.3 °F (36.8 °C) 100 21 112/63 95 %   04/27/24 0515 -- -- -- -- 96 %   04/27/24 0415 97.4 °F (36.3 °C) (!) 102 -- 133/69 97 %   04/27/24 0200 98.2 °F (36.8 °C) -- -- -- --   04/27/24 0045 (!) 93.2 °F (34 °C) (!) 108 24 (!) 157/73 96 %   04/26/24 2130 -- (!) 111 24 -- 98 %   04/26/24 2030 97.7 °F (36.5 °C) (!) 112 16 132/84 100 %   04/26/24 1700 98.9 °F (37.2 °C) (!) 102 18 (!) 167/67 97 %   04/26/24 1633 -- 86 18 (!) 194/81 --   04/26/24 1621 97 °F (36.1 °C) 87 18 (!) 160/80 --   04/26/24 1615 -- 88 -- (!) 211/72 --   04/26/24 1600 -- 83 -- (!) 220/77 --   04/26/24 1545 -- 93 -- (!) 191/76 --   04/26/24 1530 -- 84 -- (!) 182/82 --   04/26/24 1515 -- 87 -- (!) 184/83 --   04/26/24 1500 -- 85 -- (!) 189/94 --   04/26/24 1445 -- 92 -- (!) 200/74 --   04/26/24 1430 -- 91 -- (!) 167/70 --   04/26/24 1415 -- 60 -- 110/77 --   04/26/24 1400 -- 78 -- (!) 182/78 --   04/26/24 1345 -- (!) 102 -- (!) 163/106 --   04/26/24 1330 -- (!) 102 -- (!) 213/80 --   04/26/24 1316 -- (!) 102 18 (!) 201/72 --   04/26/24 1310 98 °F (36.7 °C) (!) 105 18 (!) 170/83 --   04/26/24 1200 97.1 °F (36.2 °C) (!) 107 18 (!) 159/79 90 %   04/26/24 1119 -- 98 18 -- 95 %          Weight 
IntraVENous Q20 MIN PRN    midodrine (PROAMATINE) tablet 10 mg  10 mg Oral TID       Review of Symptoms: comprehensive ROS negative except above.   Objective:   Patient Vitals for the past 24 hrs:   Temp Pulse Resp BP SpO2   04/15/24 0830 98.3 °F (36.8 °C) 80 19 (!) 154/54 --   04/15/24 0728 -- -- -- -- 99 %   04/15/24 0400 98.1 °F (36.7 °C) 75 18 (!) 153/54 99 %   04/15/24 0000 98 °F (36.7 °C) 78 18 (!) 147/57 91 %   04/14/24 2050 -- -- 16 -- --   04/14/24 2020 -- -- 18 -- --   04/14/24 2015 98.2 °F (36.8 °C) 83 18 (!) 151/43 100 %   04/14/24 1900 -- 95 -- -- --   04/14/24 1612 98 °F (36.7 °C) 82 20 (!) 149/45 99 %   04/14/24 1430 -- 93 20 (!) 141/61 --        Weight change:      04/13 1901 - 04/15 0700  In: 30 [P.O.:30]  Out: 50 [Urine:50]  No intake or output data in the 24 hours ending 04/15/24 1125    Physical Exam:   General: comfortable, no acute distress   HEENT sclera anicteric, supple neck, no thyromegaly  CVS: S1S2 heard,  no rub  RS: + air entry b/l,   Abd: Soft, Non tender,   Neuro: non focal, awake, alert   Extrm: +edema,   Skin: no visible  Rash          Data Review:     LABS:   Hematology:   Recent Labs     04/13/24  0337 04/14/24  0135 04/15/24  0418   WBC 12.4 12.0 11.5   HGB 7.6* 7.5* 7.3*   HCT 24.1* 23.6* 23.2*     Chemistry:   Recent Labs     04/13/24  0337 04/14/24  0135 04/15/24  0418   BUN 58* 52* 85*   K 5.2 4.6 5.7*   * 135* 133*   CL 98* 101 102   CO2 27 28 27            Procedures/imaging: see electronic medical records for all procedures, Xrays and details which were not copied into this note but were reviewed prior to creation of Plan          Assessment & Plan:     As above         Alistair Suarez MD  4/15/2024  11:25 AM                \  
heparin (porcine) injection 5,000 Units  5,000 Units SubCUTAneous 3 times per day    [Held by provider] HYDROcodone-chlorpheniramine (TUSSIONEX) 10-8 MG/5ML oral suspension 5 mL  5 mL Oral Q12H PRN    perflutren lipid microspheres (DEFINITY) injection 2 mL  2 mL IntraVENous ONCE PRN    [Held by provider] phenol 1.4 % mouth spray 1 spray  1 spray Mouth/Throat Q2H PRN    [Held by provider] oxyCODONE-acetaminophen (PERCOCET) 5-325 MG per tablet 1 tablet  1 tablet Oral Q4H PRN    naloxone (NARCAN) injection 0.4 mg  0.4 mg IntraVENous PRN    [Held by provider] benzonatate (TESSALON) capsule 100 mg  100 mg Oral TID PRN    [Held by provider] guaiFENesin (MUCINEX) extended release tablet 600 mg  600 mg Oral BID    arformoterol tartrate (BROVANA) nebulizer solution 15 mcg  15 mcg Nebulization BID RT    budesonide (PULMICORT) nebulizer suspension 1 mg  1 mg Nebulization BID RT    allopurinol (ZYLOPRIM) tablet 100 mg  100 mg Oral Once per day on Mon Wed Fri    aspirin chewable tablet 81 mg  81 mg Oral Daily    atorvastatin (LIPITOR) tablet 80 mg  80 mg Oral Nightly    Vitamin D (CHOLECALCIFEROL) tablet 2,000 Units  2,000 Units Oral Daily    ezetimibe (ZETIA) tablet 10 mg  10 mg Oral Daily    folic acid (FOLVITE) tablet 1 mg  1 mg Oral Daily    gabapentin (NEURONTIN) capsule 300 mg  300 mg Oral QPM    glucose chewable tablet 16 g  4 tablet Oral PRN    dextrose bolus 10% 125 mL  125 mL IntraVENous PRN    Or    dextrose bolus 10% 250 mL  250 mL IntraVENous PRN    glucagon (rDNA) injection 1 mg  1 mg SubCUTAneous PRN    dextrose 10 % infusion   IntraVENous Continuous PRN    sevelamer (RENVELA) tablet 800 mg  800 mg Oral TID WC    vitamin B-12 (CYANOCOBALAMIN) tablet 1,000 mcg  1,000 mcg Oral Daily    sodium chloride flush 0.9 % injection 5-40 mL  5-40 mL IntraVENous 2 times per day    sodium chloride flush 0.9 % injection 5-40 mL  5-40 mL IntraVENous PRN    0.9 % sodium chloride infusion   IntraVENous PRN    ondansetron 
perflutren lipid microspheres (DEFINITY) injection 2 mL  2 mL IntraVENous ONCE PRN    ipratropium 0.5 mg-albuterol 2.5 mg (DUONEB) nebulizer solution 1 Dose  1 Dose Inhalation Q6H WA RT    phenol 1.4 % mouth spray 1 spray  1 spray Mouth/Throat Q2H PRN    oxyCODONE-acetaminophen (PERCOCET) 5-325 MG per tablet 1 tablet  1 tablet Oral Q4H PRN    naloxone (NARCAN) injection 0.4 mg  0.4 mg IntraVENous PRN    benzonatate (TESSALON) capsule 100 mg  100 mg Oral TID PRN    guaiFENesin (MUCINEX) extended release tablet 600 mg  600 mg Oral BID    arformoterol tartrate (BROVANA) nebulizer solution 15 mcg  15 mcg Nebulization BID RT    budesonide (PULMICORT) nebulizer suspension 1 mg  1 mg Nebulization BID RT    allopurinol (ZYLOPRIM) tablet 100 mg  100 mg Oral Once per day on Mon Wed Fri    aspirin chewable tablet 81 mg  81 mg Oral Daily    atorvastatin (LIPITOR) tablet 80 mg  80 mg Oral Nightly    Vitamin D (CHOLECALCIFEROL) tablet 2,000 Units  2,000 Units Oral Daily    ezetimibe (ZETIA) tablet 10 mg  10 mg Oral Daily    folic acid (FOLVITE) tablet 1 mg  1 mg Oral Daily    gabapentin (NEURONTIN) capsule 300 mg  300 mg Oral QPM    glucose chewable tablet 16 g  4 tablet Oral PRN    dextrose bolus 10% 125 mL  125 mL IntraVENous PRN    Or    dextrose bolus 10% 250 mL  250 mL IntraVENous PRN    glucagon (rDNA) injection 1 mg  1 mg SubCUTAneous PRN    dextrose 10 % infusion   IntraVENous Continuous PRN    pantoprazole (PROTONIX) tablet 40 mg  40 mg Oral Daily    sevelamer (RENVELA) tablet 800 mg  800 mg Oral TID     vitamin B-12 (CYANOCOBALAMIN) tablet 1,000 mcg  1,000 mcg Oral Daily    sodium chloride flush 0.9 % injection 5-40 mL  5-40 mL IntraVENous 2 times per day    sodium chloride flush 0.9 % injection 5-40 mL  5-40 mL IntraVENous PRN    0.9 % sodium chloride infusion   IntraVENous PRN    ondansetron (ZOFRAN-ODT) disintegrating tablet 4 mg  4 mg Oral Q8H PRN    Or    ondansetron (ZOFRAN) injection 4 mg  4 mg IntraVENous Q6H 
polyethylene glycol (GLYCOLAX) packet 34 g  34 g Oral Daily    diatrizoate meglumine-sodium (GASTROGRAFIN) 66-10 % solution 30 mL  30 mL Oral ONCE PRN    midodrine (PROAMATINE) tablet 5 mg  5 mg Oral TID    0.9 % sodium chloride infusion   IntraVENous PRN    albumin human 25% IV solution 25 g  25 g IntraVENous PRN    nitroGLYCERIN (NITROSTAT) SL tablet 0.4 mg  0.4 mg SubLINGual Q5 Min PRN    ipratropium 0.5 mg-albuterol 2.5 mg (DUONEB) nebulizer solution 1 Dose  1 Dose Inhalation Q4H RT    epoetin agnes-epbx (RETACRIT) injection 6,000 Units  6,000 Units SubCUTAneous Once per day on Mon Wed Fri    [Held by provider] benzonatate (TESSALON) capsule 100 mg  100 mg Oral TID    Virt-Caps 1 mg  1 capsule Oral Daily    [Held by provider] HYDROcodone-chlorpheniramine (TUSSIONEX) 10-8 MG/5ML oral suspension 5 mL  5 mL Oral Q12H PRN    perflutren lipid microspheres (DEFINITY) injection 2 mL  2 mL IntraVENous ONCE PRN    [Held by provider] phenol 1.4 % mouth spray 1 spray  1 spray Mouth/Throat Q2H PRN    [Held by provider] oxyCODONE-acetaminophen (PERCOCET) 5-325 MG per tablet 1 tablet  1 tablet Oral Q4H PRN    naloxone (NARCAN) injection 0.4 mg  0.4 mg IntraVENous PRN    [Held by provider] benzonatate (TESSALON) capsule 100 mg  100 mg Oral TID PRN    [Held by provider] guaiFENesin (MUCINEX) extended release tablet 600 mg  600 mg Oral BID    arformoterol tartrate (BROVANA) nebulizer solution 15 mcg  15 mcg Nebulization BID RT    budesonide (PULMICORT) nebulizer suspension 1 mg  1 mg Nebulization BID RT    allopurinol (ZYLOPRIM) tablet 100 mg  100 mg Oral Once per day on Mon Wed Fri    aspirin chewable tablet 81 mg  81 mg Oral Daily    atorvastatin (LIPITOR) tablet 80 mg  80 mg Oral Nightly    Vitamin D (CHOLECALCIFEROL) tablet 2,000 Units  2,000 Units Oral Daily    ezetimibe (ZETIA) tablet 10 mg  10 mg Oral Daily    folic acid (FOLVITE) tablet 1 mg  1 mg Oral Daily    gabapentin (NEURONTIN) capsule 300 mg  300 mg Oral QPM    
sodium chloride flush 0.9 % injection 5-40 mL  5-40 mL IntraVENous 2 times per day    sodium chloride flush 0.9 % injection 5-40 mL  5-40 mL IntraVENous PRN    0.9 % sodium chloride infusion   IntraVENous PRN    ondansetron (ZOFRAN-ODT) disintegrating tablet 4 mg  4 mg Oral Q8H PRN    Or    ondansetron (ZOFRAN) injection 4 mg  4 mg IntraVENous Q6H PRN    [Held by provider] melatonin tablet 3 mg  3 mg Oral Nightly PRN    acetaminophen (TYLENOL) tablet 650 mg  650 mg Oral Q6H PRN    Or    acetaminophen (TYLENOL) suppository 650 mg  650 mg Rectal Q6H PRN       Review of Symptoms: comprehensive ROS negative except above.   Objective:   Patient Vitals for the past 24 hrs:   Temp Pulse Resp BP SpO2   05/07/24 0744 98.4 °F (36.9 °C) 79 20 109/64 93 %   05/07/24 0600 98 °F (36.7 °C) 84 18 129/74 97 %   05/07/24 0242 -- 81 16 -- 100 %   05/07/24 0100 98.5 °F (36.9 °C) 83 18 134/79 93 %   05/06/24 2014 -- 80 18 -- 100 %   05/06/24 2004 99.4 °F (37.4 °C) 81 20 131/71 95 %   05/06/24 1600 -- 77 -- -- --   05/06/24 1519 98.3 °F (36.8 °C) 79 20 (!) 144/83 98 %   05/06/24 1400 -- 77 -- -- --   05/06/24 1315 98.1 °F (36.7 °C) 82 18 127/70 92 %   05/06/24 1225 -- 80 18 (!) 147/79 --   05/06/24 1212 97 °F (36.1 °C) 82 18 131/73 --   05/06/24 1200 -- 80 -- 131/72 --   05/06/24 1145 -- 78 -- 129/72 --   05/06/24 1130 -- 79 -- 129/77 --   05/06/24 1115 -- 75 -- 136/72 --   05/06/24 1100 -- 73 -- 136/72 --   05/06/24 1045 -- 76 -- 130/72 --   05/06/24 1030 -- 74 -- 123/69 --   05/06/24 1015 -- 74 -- 132/69 --   05/06/24 1000 -- 75 -- 133/65 --   05/06/24 0945 -- 73 -- 124/61 --          Weight change:      05/05 1901 - 05/07 0700  In: 860 [P.O.:360]  Out: 3500     Intake/Output Summary (Last 24 hours) at 5/7/2024 0933  Last data filed at 5/6/2024 2151  Gross per 24 hour   Intake 860 ml   Output 3500 ml   Net -2640 ml         Physical Exam:     HEENT sclera anicteric, supple neck, no thyromegaly  CVS: S1S2 heard,  no rub  RS: + air entry 
tablet 0.4 mg  0.4 mg SubLINGual Q5 Min PRN    Polyvinyl Alcohol-Povidone PF (REFRESH) 1.4-0.6 % ophthalmic solution 1 drop  1 drop Both Eyes Q4H    Or    lubrifresh P.M. (artificial tears) ophthalmic ointment   Both Eyes Q4H    ipratropium 0.5 mg-albuterol 2.5 mg (DUONEB) nebulizer solution 1 Dose  1 Dose Inhalation Q4H RT    epoetin agnes-epbx (RETACRIT) injection 6,000 Units  6,000 Units SubCUTAneous Once per day on Mon Wed Fri    [Held by provider] benzonatate (TESSALON) capsule 100 mg  100 mg Oral TID    Virt-Caps 1 mg  1 capsule Oral Daily    heparin (porcine) injection 5,000 Units  5,000 Units SubCUTAneous 3 times per day    [Held by provider] HYDROcodone-chlorpheniramine (TUSSIONEX) 10-8 MG/5ML oral suspension 5 mL  5 mL Oral Q12H PRN    perflutren lipid microspheres (DEFINITY) injection 2 mL  2 mL IntraVENous ONCE PRN    [Held by provider] phenol 1.4 % mouth spray 1 spray  1 spray Mouth/Throat Q2H PRN    [Held by provider] oxyCODONE-acetaminophen (PERCOCET) 5-325 MG per tablet 1 tablet  1 tablet Oral Q4H PRN    naloxone (NARCAN) injection 0.4 mg  0.4 mg IntraVENous PRN    [Held by provider] benzonatate (TESSALON) capsule 100 mg  100 mg Oral TID PRN    [Held by provider] guaiFENesin (MUCINEX) extended release tablet 600 mg  600 mg Oral BID    arformoterol tartrate (BROVANA) nebulizer solution 15 mcg  15 mcg Nebulization BID RT    budesonide (PULMICORT) nebulizer suspension 1 mg  1 mg Nebulization BID RT    allopurinol (ZYLOPRIM) tablet 100 mg  100 mg Oral Once per day on Mon Wed Fri    aspirin chewable tablet 81 mg  81 mg Oral Daily    atorvastatin (LIPITOR) tablet 80 mg  80 mg Oral Nightly    Vitamin D (CHOLECALCIFEROL) tablet 2,000 Units  2,000 Units Oral Daily    ezetimibe (ZETIA) tablet 10 mg  10 mg Oral Daily    folic acid (FOLVITE) tablet 1 mg  1 mg Oral Daily    gabapentin (NEURONTIN) capsule 300 mg  300 mg Oral QPM    glucose chewable tablet 16 g  4 tablet Oral PRN    dextrose bolus 10% 125 mL  125 mL 
Units  0-8 Units SubCUTAneous TID     insulin lispro (HUMALOG) injection vial 0-4 Units  0-4 Units SubCUTAneous Nightly    metoprolol (LOPRESSOR) injection 5 mg  5 mg IntraVENous Q20 MIN PRN    midodrine (PROAMATINE) tablet 10 mg  10 mg Oral TID       Labs:    Recent Results (from the past 24 hour(s))   POCT Glucose    Collection Time: 04/10/24  8:27 PM   Result Value Ref Range    POC Glucose 201 (H) 70 - 110 mg/dL   Respiratory Panel, Molecular, with COVID-19 (Restricted: peds pts or suitable admitted adults)    Collection Time: 04/11/24  1:10 AM    Specimen: Nasopharyngeal   Result Value Ref Range    Adenovirus by PCR Not detected NOTD      Coronavirus 229E by PCR Not detected NOTD      Coronavirus HKU1 by PCR Not detected NOTD      Coronavirus NL63 by PCR Not detected NOTD      Coronavirus OC43 by PCR Not detected NOTD      SARS-CoV-2, PCR Not detected NOTD      Human Metapneumovirus by PCR Not detected NOTD      Rhinovirus Enterovirus PCR Not detected NOTD      Influenza A by PCR Not detected NOTD      Influenza B PCR Not detected NOTD      Parainfluenza 1 PCR Not detected NOTD      Parainfluenza 2 PCR Not detected NOTD      Parainfluenza 3 PCR Not detected NOTD      Parainfluenza 4 PCR Not detected NOTD      Respiratory Syncytial Virus by PCR Not detected NOTD      Bordetella parapertussis by PCR Not detected NOTD      Bordetella pertussis by PCR Not detected NOTD      Chlamydophila Pneumonia PCR Not detected NOTD      Mycoplasma pneumo by PCR Not detected NOTD     CBC with Auto Differential    Collection Time: 04/11/24  2:39 AM   Result Value Ref Range    WBC 11.4 4.6 - 13.2 K/uL    RBC 2.35 (L) 4.20 - 5.30 M/uL    Hemoglobin 7.1 (L) 12.0 - 16.0 g/dL    Hematocrit 22.6 (L) 35.0 - 45.0 %    MCV 96.2 78.0 - 100.0 FL    MCH 30.2 24.0 - 34.0 PG    MCHC 31.4 31.0 - 37.0 g/dL    RDW 15.8 (H) 11.6 - 14.5 %    Platelets 345 135 - 420 K/uL    MPV 9.8 9.2 - 11.8 FL    Nucleated RBCs 4.8 (H) 0  WBC    nRBC 0.55 
tablet 10 mg  10 mg Oral Daily    folic acid (FOLVITE) tablet 1 mg  1 mg Oral Daily    gabapentin (NEURONTIN) capsule 300 mg  300 mg Oral QPM    glucose chewable tablet 16 g  4 tablet Oral PRN    dextrose bolus 10% 125 mL  125 mL IntraVENous PRN    Or    dextrose bolus 10% 250 mL  250 mL IntraVENous PRN    glucagon (rDNA) injection 1 mg  1 mg SubCUTAneous PRN    dextrose 10 % infusion   IntraVENous Continuous PRN    sevelamer (RENVELA) tablet 800 mg  800 mg Oral TID WC    vitamin B-12 (CYANOCOBALAMIN) tablet 1,000 mcg  1,000 mcg Oral Daily    sodium chloride flush 0.9 % injection 5-40 mL  5-40 mL IntraVENous 2 times per day    sodium chloride flush 0.9 % injection 5-40 mL  5-40 mL IntraVENous PRN    0.9 % sodium chloride infusion   IntraVENous PRN    ondansetron (ZOFRAN-ODT) disintegrating tablet 4 mg  4 mg Oral Q8H PRN    Or    ondansetron (ZOFRAN) injection 4 mg  4 mg IntraVENous Q6H PRN    [Held by provider] melatonin tablet 3 mg  3 mg Oral Nightly PRN    acetaminophen (TYLENOL) tablet 650 mg  650 mg Oral Q6H PRN    Or    acetaminophen (TYLENOL) suppository 650 mg  650 mg Rectal Q6H PRN       Labs:    Recent Results (from the past 24 hour(s))   Culture, Blood 1    Collection Time: 05/01/24  3:15 PM    Specimen: Blood   Result Value Ref Range    Special Requests NO SPECIAL REQUESTS      Culture NO GROWTH AFTER 14 HOURS     POCT Glucose    Collection Time: 05/01/24  6:49 PM   Result Value Ref Range    POC Glucose 244 (H) 70 - 110 mg/dL   Respiratory Panel, Molecular, with COVID-19 (Restricted: peds pts or suitable admitted adults)    Collection Time: 05/01/24  7:05 PM    Specimen: Nasopharyngeal   Result Value Ref Range    Adenovirus by PCR Not detected NOTD      Coronavirus 229E by PCR Not detected NOTD      Coronavirus HKU1 by PCR Not detected NOTD      Coronavirus NL63 by PCR Not detected NOTD      Coronavirus OC43 by PCR Not detected NOTD      SARS-CoV-2, PCR Not detected NOTD      Human Metapneumovirus by PCR 
tablet Oral PRN    dextrose bolus 10% 125 mL  125 mL IntraVENous PRN    Or    dextrose bolus 10% 250 mL  250 mL IntraVENous PRN    glucagon (rDNA) injection 1 mg  1 mg SubCUTAneous PRN    dextrose 10 % infusion   IntraVENous Continuous PRN    pantoprazole (PROTONIX) tablet 40 mg  40 mg Oral Daily    sevelamer (RENVELA) tablet 800 mg  800 mg Oral TID WC    vitamin B-12 (CYANOCOBALAMIN) tablet 1,000 mcg  1,000 mcg Oral Daily    sodium chloride flush 0.9 % injection 5-40 mL  5-40 mL IntraVENous 2 times per day    sodium chloride flush 0.9 % injection 5-40 mL  5-40 mL IntraVENous PRN    0.9 % sodium chloride infusion   IntraVENous PRN    ondansetron (ZOFRAN-ODT) disintegrating tablet 4 mg  4 mg Oral Q8H PRN    Or    ondansetron (ZOFRAN) injection 4 mg  4 mg IntraVENous Q6H PRN    melatonin tablet 3 mg  3 mg Oral Nightly PRN    polyethylene glycol (GLYCOLAX) packet 17 g  17 g Oral Daily PRN    acetaminophen (TYLENOL) tablet 650 mg  650 mg Oral Q6H PRN    Or    acetaminophen (TYLENOL) suppository 650 mg  650 mg Rectal Q6H PRN    insulin glargine (LANTUS) injection vial 15 Units  15 Units SubCUTAneous Nightly    insulin lispro (HUMALOG) injection vial 0-8 Units  0-8 Units SubCUTAneous TID     insulin lispro (HUMALOG) injection vial 0-4 Units  0-4 Units SubCUTAneous Nightly    metoprolol (LOPRESSOR) injection 5 mg  5 mg IntraVENous Q20 MIN PRN    midodrine (PROAMATINE) tablet 10 mg  10 mg Oral TID       Labs:    Recent Results (from the past 24 hour(s))   POCT Glucose    Collection Time: 04/19/24  4:50 PM   Result Value Ref Range    POC Glucose 302 (H) 70 - 110 mg/dL   POCT Glucose    Collection Time: 04/19/24  9:19 PM   Result Value Ref Range    POC Glucose 342 (H) 70 - 110 mg/dL   POCT Glucose    Collection Time: 04/20/24  7:46 AM   Result Value Ref Range    POC Glucose 132 (H) 70 - 110 mg/dL   POCT Glucose    Collection Time: 04/20/24 11:19 AM   Result Value Ref Range    POC Glucose 206 (H) 70 - 110 mg/dL     Dragon 
  Component Value Date    ALT 45 04/29/2024    AST 19 04/29/2024    ALKPHOS 139 (H) 04/29/2024    BILITOT 1.1 (H) 04/29/2024      Thyroid Studies Lab Results   Component Value Date/Time    TSH 1.29 10/08/2022 05:15 PM          Signed By: Lelia Clemons PA-C     April 29, 2024       
Culture, Respiratory [[id number]] Collected: 04/13/24 0700    Order Status: Completed Specimen: Sputum Expectorated Updated: 04/15/24 1050     Special Requests NO SPECIAL REQUESTS        Gram Stain RARE WBCS SEEN         3+ EPITHELIAL CELLS SEEN               4+ GRAM POS COCCI IN CLUSTERS                  3+ GRAM POS COCCI IN CHAINS            2+ GRAM VARIABLE RODS        Culture       HEAVY NORMAL RESPIRATORY JOSETTE          Respiratory Panel, Molecular, with COVID-19 (Restricted: peds pts or suitable admitted adults) [[id number]] Collected: 04/11/24 0110    Order Status: Completed Specimen: Nasopharyngeal Updated: 04/11/24 0350     Adenovirus by PCR Not detected        Coronavirus 229E by PCR Not detected        Coronavirus HKU1 by PCR Not detected        Coronavirus NL63 by PCR Not detected        Coronavirus OC43 by PCR Not detected        SARS-CoV-2, PCR Not detected        Human Metapneumovirus by PCR Not detected        Rhinovirus Enterovirus PCR Not detected        Influenza A by PCR Not detected        Influenza B PCR Not detected        Parainfluenza 1 PCR Not detected        Parainfluenza 2 PCR Not detected        Parainfluenza 3 PCR Not detected        Parainfluenza 4 PCR Not detected        Respiratory Syncytial Virus by PCR Not detected        Bordetella parapertussis by PCR Not detected        Bordetella pertussis by PCR Not detected        Chlamydophila Pneumonia PCR Not detected        Mycoplasma pneumo by PCR Not detected                    Imaging:  [x]   I have personally reviewed the patient’s radiographs and reports  Most recent imaging:  XR ABDOMEN (KUB) (SINGLE AP VIEW)    Result Date: 4/22/2024  1.  Moderate air distention of the stomach. Gastric tube tip overlies the mid stomach. Moderate stool throughout the colon-constipation?    XR CHEST PORTABLE    Result Date: 4/22/2024  1.  Similar bilateral pulmonary opacities. 2.  Enlarged pulmonary artery suggesting pulmonary hypertension. 3.  
GROWTH 2 DAYS       Culture, Blood 2 [[id number]] Collected: 04/21/24 1832    Order Status: Completed Specimen: Blood Updated: 04/23/24 0657     Special Requests NO SPECIAL REQUESTS        Culture NO GROWTH 2 DAYS       Culture, Respiratory [[id number]] Collected: 04/13/24 0700    Order Status: Completed Specimen: Sputum Expectorated Updated: 04/15/24 1050     Special Requests NO SPECIAL REQUESTS        Gram Stain RARE WBCS SEEN         3+ EPITHELIAL CELLS SEEN               4+ GRAM POS COCCI IN CLUSTERS                  3+ GRAM POS COCCI IN CHAINS            2+ GRAM VARIABLE RODS        Culture       HEAVY NORMAL RESPIRATORY JOSETTE          Respiratory Panel, Molecular, with COVID-19 (Restricted: peds pts or suitable admitted adults) [[id number]] Collected: 04/11/24 0110    Order Status: Completed Specimen: Nasopharyngeal Updated: 04/11/24 0350     Adenovirus by PCR Not detected        Coronavirus 229E by PCR Not detected        Coronavirus HKU1 by PCR Not detected        Coronavirus NL63 by PCR Not detected        Coronavirus OC43 by PCR Not detected        SARS-CoV-2, PCR Not detected        Human Metapneumovirus by PCR Not detected        Rhinovirus Enterovirus PCR Not detected        Influenza A by PCR Not detected        Influenza B PCR Not detected        Parainfluenza 1 PCR Not detected        Parainfluenza 2 PCR Not detected        Parainfluenza 3 PCR Not detected        Parainfluenza 4 PCR Not detected        Respiratory Syncytial Virus by PCR Not detected        Bordetella parapertussis by PCR Not detected        Bordetella pertussis by PCR Not detected        Chlamydophila Pneumonia PCR Not detected        Mycoplasma pneumo by PCR Not detected                    Imaging:  [x]   I have personally reviewed the patient’s radiographs and reports  Most recent imaging:  XR ABDOMEN (KUB) (SINGLE AP VIEW)    Result Date: 4/22/2024  1.  Moderate air distention of the stomach. Gastric tube tip overlies the mid 
Granulocytes Absolute 0.2 (H) 0.00 - 0.04 K/UL    Differential Type AUTOMATED     Comprehensive Metabolic Panel    Collection Time: 04/30/24  1:19 AM   Result Value Ref Range    Sodium 128 (L) 136 - 145 mmol/L    Potassium 4.8 3.5 - 5.5 mmol/L    Chloride 92 (L) 100 - 111 mmol/L    CO2 27 21 - 32 mmol/L    Anion Gap 9 3.0 - 18 mmol/L    Glucose 270 (H) 74 - 99 mg/dL    BUN 54 (H) 7.0 - 18 MG/DL    Creatinine 4.91 (H) 0.6 - 1.3 MG/DL    Bun/Cre Ratio 11 (L) 12 - 20      Est, Glom Filt Rate 9 (L) >60 ml/min/1.73m2    Calcium 8.9 8.5 - 10.1 MG/DL    Total Bilirubin 0.8 0.2 - 1.0 MG/DL    ALT 47 13 - 56 U/L    AST 14 10 - 38 U/L    Alk Phosphatase 142 (H) 45 - 117 U/L    Total Protein 5.9 (L) 6.4 - 8.2 g/dL    Albumin 3.0 (L) 3.4 - 5.0 g/dL    Globulin 2.9 2.0 - 4.0 g/dL    Albumin/Globulin Ratio 1.0 0.8 - 1.7     POCT Glucose    Collection Time: 04/30/24  8:26 AM   Result Value Ref Range    POC Glucose 211 (H) 70 - 110 mg/dL   Vascular duplex lower extremity venous bilateral    Collection Time: 04/30/24  9:02 AM   Result Value Ref Range    Body Surface Area 1.88 m2   Cortisol, 0 minutes    Collection Time: 04/30/24  9:44 AM   Result Value Ref Range    Cortisol, Random 2.9 ug/dL   APTT    Collection Time: 04/30/24  9:44 AM   Result Value Ref Range    APTT 22.9 (L) 23.0 - 36.4 SEC   POCT Glucose    Collection Time: 04/30/24 12:22 PM   Result Value Ref Range    POC Glucose 333 (H) 70 - 110 mg/dL   POCT Glucose    Collection Time: 04/30/24  4:26 PM   Result Value Ref Range    POC Glucose 332 (H) 70 - 110 mg/dL       Signed By: Aldo Olivares DO     April 30, 2024      Disclaimer: Sections of this note are dictated using utilizing voice recognition software.  Minor typographical errors may be present. If questions arise, please do not hesitate to contact me or call our department.      
Lymphocytes Absolute 0.7 (L) 0.9 - 3.6 K/UL    Monocytes Absolute 1.2 0.05 - 1.2 K/UL    Eosinophils Absolute 0.0 0.0 - 0.4 K/UL    Basophils Absolute 0.0 0.0 - 0.1 K/UL    Immature Granulocytes Absolute 0.3 (H) 0.00 - 0.04 K/UL    Differential Type AUTOMATED     Phosphorus    Collection Time: 05/04/24  2:00 AM   Result Value Ref Range    Phosphorus 2.8 2.5 - 4.9 MG/DL   Comprehensive Metabolic Panel    Collection Time: 05/04/24  2:00 AM   Result Value Ref Range    Sodium 134 (L) 136 - 145 mmol/L    Potassium 3.9 3.5 - 5.5 mmol/L    Chloride 97 (L) 100 - 111 mmol/L    CO2 27 21 - 32 mmol/L    Anion Gap 10 3.0 - 18 mmol/L    Glucose 245 (H) 74 - 99 mg/dL    BUN 57 (H) 7.0 - 18 MG/DL    Creatinine 4.16 (H) 0.6 - 1.3 MG/DL    Bun/Cre Ratio 14 12 - 20      Est, Glom Filt Rate 11 (L) >60 ml/min/1.73m2    Calcium 9.1 8.5 - 10.1 MG/DL    Total Bilirubin 0.9 0.2 - 1.0 MG/DL    ALT 39 13 - 56 U/L    AST 15 10 - 38 U/L    Alk Phosphatase 142 (H) 45 - 117 U/L    Total Protein 5.8 (L) 6.4 - 8.2 g/dL    Albumin 3.0 (L) 3.4 - 5.0 g/dL    Globulin 2.8 2.0 - 4.0 g/dL    Albumin/Globulin Ratio 1.1 0.8 - 1.7     POCT Glucose    Collection Time: 05/04/24  8:27 AM   Result Value Ref Range    POC Glucose 159 (H) 70 - 110 mg/dL   POCT Glucose    Collection Time: 05/04/24 12:02 PM   Result Value Ref Range    POC Glucose 182 (H) 70 - 110 mg/dL       Signed By: Aldo Olivares,      May 4, 2024      Disclaimer: Sections of this note are dictated using utilizing voice recognition software.  Minor typographical errors may be present. If questions arise, please do not hesitate to contact me or call our department.      
Time: 04/14/24  8:52 AM   Result Value Ref Range    POC Glucose 140 (H) 70 - 110 mg/dL     Dragon medical dictation software was used for portions of this report. Unintended errors may occur.    Personal Protective Equipment ( face mask ) was used while interacting with the patient      Signed By: Maksim Hawk MD     April 14, 2024      
mmol/L    Anion Gap 5 3.0 - 18 mmol/L    Glucose 198 (H) 74 - 99 mg/dL    BUN 86 (H) 7.0 - 18 MG/DL    Creatinine 3.47 (H) 0.6 - 1.3 MG/DL    Bun/Cre Ratio 25 (H) 12 - 20      Est, Glom Filt Rate 14 (L) >60 ml/min/1.73m2    Calcium 9.0 8.5 - 10.1 MG/DL   CBC with Auto Differential    Collection Time: 04/19/24  9:27 AM   Result Value Ref Range    WBC 21.9 (H) 4.6 - 13.2 K/uL    RBC 2.78 (L) 4.20 - 5.30 M/uL    Hemoglobin 8.6 (L) 12.0 - 16.0 g/dL    Hematocrit 27.3 (L) 35.0 - 45.0 %    MCV 98.2 78.0 - 100.0 FL    MCH 30.9 24.0 - 34.0 PG    MCHC 31.5 31.0 - 37.0 g/dL    RDW 16.4 (H) 11.6 - 14.5 %    Platelets 429 (H) 135 - 420 K/uL    MPV 9.7 9.2 - 11.8 FL    Nucleated RBCs 4.3 (H) 0  WBC    nRBC 0.95 (H) 0.00 - 0.01 K/uL    Neutrophils % 82 (H) 40 - 73 %    Lymphocytes % 8 (L) 21 - 52 %    Monocytes % 6 3 - 10 %    Eosinophils % 0 0 - 5 %    Basophils % 0 0 - 2 %    Immature Granulocytes % 4 (H) 0.0 - 0.5 %    Neutrophils Absolute 17.9 (H) 1.8 - 8.0 K/UL    Lymphocytes Absolute 1.8 0.9 - 3.6 K/UL    Monocytes Absolute 1.4 (H) 0.05 - 1.2 K/UL    Eosinophils Absolute 0.0 0.0 - 0.4 K/UL    Basophils Absolute 0.1 0.0 - 0.1 K/UL    Immature Granulocytes Absolute 0.8 (H) 0.00 - 0.04 K/UL    Differential Type AUTOMATED     POCT Glucose    Collection Time: 04/19/24  1:35 PM   Result Value Ref Range    POC Glucose 255 (H) 70 - 110 mg/dL     Dragon medical dictation software was used for portions of this report. Unintended errors may occur.    Personal Protective Equipment ( face mask ) was used while interacting with the patient      Signed By: Low Borrego MD     April 19, 2024      
procedures, Xrays and details which were not copied into this note but were reviewed prior to creation of Plan          Assessment & Plan:     As above         JOE MARCELO MD  4/25/2024  11:06 AM                  
04/21/24  1616 04/22/24  0024   WBC 23.9* 18.3*   HGB 7.5* 7.6*   HCT 24.4* 25.7*       Chemistry:   Recent Labs     04/21/24  1616 04/22/24  0024   BUN 91* 55*   K 6.3* 4.8   * 139    104   CO2 23 30   PHOS  --  3.8              Procedures/imaging: see electronic medical records for all procedures, Xrays and details which were not copied into this note but were reviewed prior to creation of Plan          Assessment & Plan:     As above         JOE MARCELO MD  4/22/2024  10:04 AM                  
This care involved high complexity decision making to assess, manipulate, and support vital system functions, to treat this degreee vital organ system failure and to prevent further life threatening deterioration of the patient’s condition  The services I provided to this patient were to treat and/or prevent clinically significant deterioration that could result in the failure of one or more body systems and/or organ systems due to respiratory distress, hypoxia, cardiac dysrhythmia.       Shelby Jimenez PA-C  04/25/24  Pulmonary, Critical Care Medicine  [facility]

## 2024-05-07 NOTE — CARE COORDINATION
Requested Case Management specialist to assist with transportation to [facility] of [facility].  Address is [address] [place] [address]. [place] [address] and phone number is [phone]  Patient will require BLS transport.   Pt requires Stretcher If stretcher, reason: Congestive Heart Failure, COPD, ESRD on dialysis, hx of a stroke, impaired mobility  Patient is currently requiring oxygen:  No  Height:5'1\"   Weight: 181 lbs  Pt is on isolation: Yes Isolation is for: ESBL  Is the pt ready now? yes  Requested time: Next Available  PCS Faxed: no  Insurance verified on face sheet: yes  Auth needed for transport: yes  CM completed PCS/ Envelope and placed on chart.       Yeny Patel RN, BSN, [facility]  Case Management  [phone]

## 2024-05-07 NOTE — CARE COORDINATION
Call made to [employer] [facility] [phone], spoke with Ralph, dispatch will call the nurses station with CAITIE.  Trip # [id number].

## 2024-05-07 NOTE — PLAN OF CARE
Problem: SLP Adult - Impaired Swallowing  Goal: By Discharge: Advance to least restrictive diet without signs or symptoms of aspiration for planned discharge setting.  See evaluation for individualized goals.  Description: Patient will:  1. Tolerate PO trials with 0 s/s overt distress in 4/5 trials  2. Utilize compensatory swallow strategies/maneuvers (decrease bite/sip, size/rate, alt. liq/sol) with min cues in 4/5 trials  3. Perform oral-motor/laryngeal exercises to increase oropharyngeal swallow function with min cues  4. Complete an objective swallow study (i.e., MBSS) to assess swallow integrity, r/o aspiration, and determine of safest LRD, min A as indicated/ordered by MD     Rec:     Easy to chew diet with thin liquids  Aspiration precautions  HOB >45 during po intake, remain >30 for 30-45 minutes after po   Small bites/sips; alternate liquid/solid with slow feeding rate   Oral care TID  Meds per pt preference  Outcome: Progressing  SPEECH LANGUAGE PATHOLOGY BEDSIDE SWALLOW EVALUATION/TREATMENT    Patient: Christy Donahue (73 y.o. female)  Date: 4/26/2024  Primary Diagnosis: COPD exacerbation (Formerly [facility]) [J44.1]  End stage renal disease on dialysis (Formerly [facility]) [N18.6, Z99.2]  Acute respiratory failure with hypoxia (Formerly [facility]) [J96.01]       Precautions: Aspiration  PLOF: As per H&P  ASSESSMENT:  Based on the objective data described below, the patient presents with mild oropharyngeal dysphagia. Pt s/p 5 days NPO secondary to ileus; cleared for PO by MD this AM. Pt tolerated reg solid, puree, and thin liquids +/- straw without any overt s/sx of aspiration. Mastication was slightly labored with positive oral clearance. Laryngeal elevation was weak/delayed to palpation. Pt demo generalized weakness and poor endurance. Recommend initiation of easy to chew with thin liquids, aspiration precautions, oral care TID, and meds as tolerated.     TREATMENT:  Skilled therapy initiated; Educated patient on aspiration precautions and 
  Problem: Discharge Planning  Goal: Discharge to home or other facility with appropriate resources  4/15/2024 1308 by Preethi Browne RN  Outcome: Progressing  Flowsheets (Taken 4/15/2024 0830)  Discharge to home or other facility with appropriate resources:   Identify barriers to discharge with patient and caregiver   Arrange for needed discharge resources and transportation as appropriate   Identify discharge learning needs (meds, wound care, etc)   Arrange for interpreters to assist at discharge as needed   Refer to discharge planning if patient needs post-hospital services based on physician order or complex needs related to functional status, cognitive ability or social support system  4/15/2024 0048 by Glenny Naylor RN  Outcome: Progressing  Flowsheets (Taken 4/14/2024 2015)  Discharge to home or other facility with appropriate resources: Identify barriers to discharge with patient and caregiver     Problem: Pain  Goal: Verbalizes/displays adequate comfort level or baseline comfort level  4/15/2024 1308 by Preethi Browne RN  Outcome: Progressing  4/15/2024 0048 by Glenny Naylor RN  Outcome: Progressing     Problem: Safety - Adult  Goal: Free from fall injury  4/15/2024 1308 by Preethi Browne RN  Outcome: Progressing  4/15/2024 0048 by Glenny Naylor RN  Outcome: Progressing     Problem: Skin/Tissue Integrity  Goal: Absence of new skin breakdown  4/15/2024 1308 by Preethi Browne RN  Outcome: Progressing  4/15/2024 0048 by Glenny Naylor RN  Outcome: Progressing     Problem: Chronic Conditions and Co-morbidities  Goal: Patient's chronic conditions and co-morbidity symptoms are monitored and maintained or improved  4/15/2024 1308 by Preethi Browne RN  Outcome: Progressing  Flowsheets (Taken 4/15/2024 0830)  Care Plan - Patient's Chronic Conditions and Co-Morbidity Symptoms are Monitored and Maintained or Improved:   Monitor and assess patient's chronic conditions and comorbid symptoms for stability, 
  Problem: Discharge Planning  Goal: Discharge to home or other facility with appropriate resources  4/15/2024 1308 by Preethi Browne RN  Outcome: Progressing  Flowsheets (Taken 4/15/2024 0830)  Discharge to home or other facility with appropriate resources:   Identify barriers to discharge with patient and caregiver   Arrange for needed discharge resources and transportation as appropriate   Identify discharge learning needs (meds, wound care, etc)   Arrange for interpreters to assist at discharge as needed   Refer to discharge planning if patient needs post-hospital services based on physician order or complex needs related to functional status, cognitive ability or social support system  4/15/2024 0048 by Glenny Naylor RN  Outcome: Progressing  Flowsheets (Taken 4/14/2024 2015)  Discharge to home or other facility with appropriate resources: Identify barriers to discharge with patient and caregiver     Problem: Pain  Goal: Verbalizes/displays adequate comfort level or baseline comfort level  4/15/2024 1308 by Preethi Browne RN  Outcome: Progressing  4/15/2024 0048 by Glenny Naylor RN  Outcome: Progressing     Problem: Safety - Adult  Goal: Free from fall injury  4/15/2024 1308 by Preethi Browne RN  Outcome: Progressing  4/15/2024 0048 by Glenny Naylor RN  Outcome: Progressing     Problem: Skin/Tissue Integrity  Goal: Absence of new skin breakdown  4/15/2024 1308 by Preethi Browne RN  Outcome: Progressing  4/15/2024 0048 by Glenny Naylor RN  Outcome: Progressing     Problem: Chronic Conditions and Co-morbidities  Goal: Patient's chronic conditions and co-morbidity symptoms are monitored and maintained or improved  4/15/2024 1308 by Preethi Browne RN  Outcome: Progressing  Flowsheets (Taken 4/15/2024 0830)  Care Plan - Patient's Chronic Conditions and Co-Morbidity Symptoms are Monitored and Maintained or Improved:   Monitor and assess patient's chronic conditions and comorbid symptoms for stability, 
  Problem: Discharge Planning  Goal: Discharge to home or other facility with appropriate resources  4/16/2024 1248 by Preethi Browne RN  Outcome: Progressing  Flowsheets (Taken 4/16/2024 0758)  Discharge to home or other facility with appropriate resources:   Identify barriers to discharge with patient and caregiver   Arrange for needed discharge resources and transportation as appropriate   Arrange for interpreters to assist at discharge as needed   Refer to discharge planning if patient needs post-hospital services based on physician order or complex needs related to functional status, cognitive ability or social support system  4/16/2024 0636 by Jodi Reis RN  Outcome: Progressing     Problem: Pain  Goal: Verbalizes/displays adequate comfort level or baseline comfort level  4/16/2024 1248 by Preethi Browne RN  Outcome: Progressing  4/16/2024 0636 by Jodi Reis RN  Outcome: Progressing     Problem: Safety - Adult  Goal: Free from fall injury  4/16/2024 1248 by Preethi Browne RN  Outcome: Progressing  4/16/2024 0636 by Jodi Reis RN  Outcome: Progressing     Problem: Skin/Tissue Integrity  Goal: Absence of new skin breakdown  4/16/2024 1248 by Preethi Browne RN  Outcome: Progressing  4/16/2024 0636 by Jodi Reis RN  Outcome: Progressing     Problem: Chronic Conditions and Co-morbidities  Goal: Patient's chronic conditions and co-morbidity symptoms are monitored and maintained or improved  4/16/2024 1248 by Preethi Browne RN  Outcome: Progressing  Flowsheets (Taken 4/16/2024 0758)  Care Plan - Patient's Chronic Conditions and Co-Morbidity Symptoms are Monitored and Maintained or Improved:   Monitor and assess patient's chronic conditions and comorbid symptoms for stability, deterioration, or improvement   Collaborate with multidisciplinary team to address chronic and comorbid conditions and prevent exacerbation or deterioration   Update acute care plan with appropriate goals if 
  Problem: Discharge Planning  Goal: Discharge to home or other facility with appropriate resources  4/17/2024 0956 by Preethi Browne, RN  Outcome: Progressing  Flowsheets (Taken 4/17/2024 0800)  Discharge to home or other facility with appropriate resources:   Identify barriers to discharge with patient and caregiver   Arrange for needed discharge resources and transportation as appropriate   Identify discharge learning needs (meds, wound care, etc)   Arrange for interpreters to assist at discharge as needed   Refer to discharge planning if patient needs post-hospital services based on physician order or complex needs related to functional status, cognitive ability or social support system  4/17/2024 0006 by Fiona Coyle RN  Outcome: Not Progressing     Problem: Pain  Goal: Verbalizes/displays adequate comfort level or baseline comfort level  Outcome: Progressing     Problem: Safety - Adult  Goal: Free from fall injury  Outcome: Progressing     Problem: Skin/Tissue Integrity  Goal: Absence of new skin breakdown  Outcome: Progressing     Problem: Chronic Conditions and Co-morbidities  Goal: Patient's chronic conditions and co-morbidity symptoms are monitored and maintained or improved  4/17/2024 0956 by Preethi Browne RN  Outcome: Progressing  4/17/2024 0938 by Olive Boland, RN  Outcome: Progressing  Flowsheets (Taken 4/17/2024 0800 by Preethi Browne, RN)  Care Plan - Patient's Chronic Conditions and Co-Morbidity Symptoms are Monitored and Maintained or Improved:   Monitor and assess patient's chronic conditions and comorbid symptoms for stability, deterioration, or improvement   Collaborate with multidisciplinary team to address chronic and comorbid conditions and prevent exacerbation or deterioration   Update acute care plan with appropriate goals if chronic or comorbid symptoms are exacerbated and prevent overall improvement and discharge     Problem: Discharge Planning  Goal: Discharge to home or other facility 
  Problem: Discharge Planning  Goal: Discharge to home or other facility with appropriate resources  4/21/2024 0215 by Amelia Andre RN  Outcome: Progressing  4/20/2024 1308 by Lita Virk RN  Outcome: Progressing     Problem: Pain  Goal: Verbalizes/displays adequate comfort level or baseline comfort level  4/21/2024 0215 by Amelia Andre RN  Outcome: Progressing  4/20/2024 1308 by Lita Virk RN  Outcome: Progressing     Problem: Safety - Adult  Goal: Free from fall injury  4/21/2024 0215 by Amelia Andre RN  Outcome: Progressing  4/20/2024 1308 by Lita Virk RN  Outcome: Progressing     Problem: Skin/Tissue Integrity  Goal: Absence of new skin breakdown  Description: 1.  Monitor for areas of redness and/or skin breakdown  2.  Assess vascular access sites hourly  3.  Every 4-6 hours minimum:  Change oxygen saturation probe site  4.  Every 4-6 hours:  If on nasal continuous positive airway pressure, respiratory therapy assess nares and determine need for appliance change or resting period.  4/21/2024 0215 by Amelia Andre RN  Outcome: Progressing  4/20/2024 1308 by Lita Virk RN  Outcome: Progressing     Problem: Chronic Conditions and Co-morbidities  Goal: Patient's chronic conditions and co-morbidity symptoms are monitored and maintained or improved  4/21/2024 0215 by Amelia Andre RN  Outcome: Progressing  4/20/2024 1308 by Lita Virk RN  Outcome: Progressing     Problem: Nutrition Deficit:  Goal: Optimize nutritional status  4/21/2024 0215 by Amelia Andre RN  Outcome: Progressing  4/20/2024 1308 by Lita Virk RN  Outcome: Progressing     
  Problem: Discharge Planning  Goal: Discharge to home or other facility with appropriate resources  4/21/2024 1142 by Lita Virk RN  Outcome: Progressing  4/21/2024 0215 by Amelia Andre RN  Outcome: Progressing     Problem: Pain  Goal: Verbalizes/displays adequate comfort level or baseline comfort level  4/21/2024 1142 by Lita Virk RN  Outcome: Progressing  4/21/2024 0215 by Amelia Andre RN  Outcome: Progressing     Problem: Safety - Adult  Goal: Free from fall injury  4/21/2024 1142 by Lita Virk RN  Outcome: Progressing  4/21/2024 0215 by Amelia Andre RN  Outcome: Progressing     Problem: Skin/Tissue Integrity  Goal: Absence of new skin breakdown  Description: 1.  Monitor for areas of redness and/or skin breakdown  2.  Assess vascular access sites hourly  3.  Every 4-6 hours minimum:  Change oxygen saturation probe site  4.  Every 4-6 hours:  If on nasal continuous positive airway pressure, respiratory therapy assess nares and determine need for appliance change or resting period.  4/21/2024 1142 by Lita Virk RN  Outcome: Progressing  4/21/2024 0215 by Amelia Andre RN  Outcome: Progressing     Problem: Chronic Conditions and Co-morbidities  Goal: Patient's chronic conditions and co-morbidity symptoms are monitored and maintained or improved  4/21/2024 1142 by Lita Virk RN  Outcome: Progressing  4/21/2024 0215 by Amelia Andre RN  Outcome: Progressing     Problem: Nutrition Deficit:  Goal: Optimize nutritional status  4/21/2024 1142 by Lita Virk RN  Outcome: Progressing  4/21/2024 0215 by Amelia Andre RN  Outcome: Progressing     
  Problem: Discharge Planning  Goal: Discharge to home or other facility with appropriate resources  4/28/2024 0958 by Elodia Puente RN  Outcome: Progressing  4/28/2024 0157 by Jodi Reis RN  Outcome: Progressing     Problem: Pain  Goal: Verbalizes/displays adequate comfort level or baseline comfort level  4/28/2024 0958 by Elodia Puente RN  Outcome: Progressing  4/28/2024 0157 by Jodi Reis RN  Outcome: Progressing     Problem: Safety - Adult  Goal: Free from fall injury  4/28/2024 0958 by Elodia Puente RN  Outcome: Progressing  4/28/2024 0157 by Jodi Reis RN  Outcome: Progressing     Problem: Skin/Tissue Integrity  Goal: Absence of new skin breakdown  Description: 1.  Monitor for areas of redness and/or skin breakdown  2.  Assess vascular access sites hourly  3.  Every 4-6 hours minimum:  Change oxygen saturation probe site  4.  Every 4-6 hours:  If on nasal continuous positive airway pressure, respiratory therapy assess nares and determine need for appliance change or resting period.  4/28/2024 0958 by Elodia Puente RN  Outcome: Progressing  4/28/2024 0157 by Jodi Reis RN  Outcome: Progressing     Problem: Chronic Conditions and Co-morbidities  Goal: Patient's chronic conditions and co-morbidity symptoms are monitored and maintained or improved  4/28/2024 0958 by Elodia Puente RN  Outcome: Progressing  4/28/2024 0157 by Jodi Reis RN  Outcome: Progressing     Problem: Nutrition Deficit:  Goal: Optimize nutritional status  4/28/2024 0958 by Elodia Puente RN  Outcome: Progressing  4/28/2024 0157 by Jodi Reis RN  Outcome: Progressing     
  Problem: Discharge Planning  Goal: Discharge to home or other facility with appropriate resources  4/28/2024 2350 by Lucas Doyle RN  Outcome: Progressing  4/28/2024 0958 by Elodia Puente RN  Outcome: Progressing     Problem: Pain  Goal: Verbalizes/displays adequate comfort level or baseline comfort level  4/28/2024 2350 by Lucas Doyle RN  Outcome: Progressing  4/28/2024 0958 by Elodia Puente RN  Outcome: Progressing     Problem: Safety - Adult  Goal: Free from fall injury  4/28/2024 2350 by Lucas Doyle RN  Outcome: Progressing  4/28/2024 0958 by Elodia Puente RN  Outcome: Progressing     Problem: Skin/Tissue Integrity  Goal: Absence of new skin breakdown  Description: 1.  Monitor for areas of redness and/or skin breakdown  2.  Assess vascular access sites hourly  3.  Every 4-6 hours minimum:  Change oxygen saturation probe site  4.  Every 4-6 hours:  If on nasal continuous positive airway pressure, respiratory therapy assess nares and determine need for appliance change or resting period.  4/28/2024 2350 by Lucas Doyle RN  Outcome: Progressing  4/28/2024 0958 by Elodia Puente RN  Outcome: Progressing     Problem: Chronic Conditions and Co-morbidities  Goal: Patient's chronic conditions and co-morbidity symptoms are monitored and maintained or improved  4/28/2024 2350 by Lucas Doyle RN  Outcome: Progressing  4/28/2024 0958 by Elodia Puente RN  Outcome: Progressing     Problem: Occupational Therapy - Adult  Goal: By Discharge: Performs self-care activities at highest level of function for planned discharge setting.  See evaluation for individualized goals.  Description: Occupational Therapy Goals:  Re-evaluated 4/28/2024, pt recently extubated, goals updated to reflect current functional status, to be met within 7-10 days.    1.  Patient will perform bed mobility for ADLs  with supervision.   2.  Patient will perform grooming while sitting at EOB with Good balance with 
  Problem: Discharge Planning  Goal: Discharge to home or other facility with appropriate resources  4/30/2024 1459 by Cher Lay RN  Outcome: Progressing  4/30/2024 0137 by Carolina Valencia RN  Outcome: Progressing     Problem: Safety - Adult  Goal: Free from fall injury  4/30/2024 1459 by Cher Lay RN  Outcome: Progressing  4/30/2024 0137 by Carolina Valencia RN  Outcome: Progressing     Problem: Skin/Tissue Integrity  Goal: Absence of new skin breakdown  Description: 1.  Monitor for areas of redness and/or skin breakdown  2.  Assess vascular access sites hourly  3.  Every 4-6 hours minimum:  Change oxygen saturation probe site  4.  Every 4-6 hours:  If on nasal continuous positive airway pressure, respiratory therapy assess nares and determine need for appliance change or resting period.  4/30/2024 1459 by Cher Lay RN  Outcome: Progressing  4/30/2024 0137 by Carolina Valencia RN  Outcome: Progressing     Problem: Chronic Conditions and Co-morbidities  Goal: Patient's chronic conditions and co-morbidity symptoms are monitored and maintained or improved  4/30/2024 1459 by Cher Lay RN  Outcome: Progressing  4/30/2024 0137 by Carolina Valencia RN  Outcome: Progressing     Problem: Nutrition Deficit:  Goal: Optimize nutritional status  Outcome: Progressing     
  Problem: Discharge Planning  Goal: Discharge to home or other facility with appropriate resources  4/8/2024 1547 by Divya Kilgore RN  Outcome: Progressing     Problem: Pain  Goal: Verbalizes/displays adequate comfort level or baseline comfort level  4/8/2024 1547 by Divya Kilgore RN  Outcome: Progressing     Problem: Safety - Adult  Goal: Free from fall injury  4/8/2024 1547 by Divya Kilgore RN  Outcome: Progressing     Problem: Skin/Tissue Integrity  Goal: Absence of new skin breakdown  Description: 1.  Monitor for areas of redness and/or skin breakdown  2.  Assess vascular access sites hourly  3.  Every 4-6 hours minimum:  Change oxygen saturation probe site  4.  Every 4-6 hours:  If on nasal continuous positive airway pressure, respiratory therapy assess nares and determine need for appliance change or resting period.  4/8/2024 1547 by Divya Kilgore RN  Outcome: Progressing     Problem: Chronic Conditions and Co-morbidities  Goal: Patient's chronic conditions and co-morbidity symptoms are monitored and maintained or improved  4/8/2024 1547 by Divya Kilgore, RN  Outcome: Progressing  4/8/2024 1000 by Olive Boland, RN  Outcome: Progressing  4/8/2024 0419 by Selina Chakraborty, RN  Outcome: Progressing     
  Problem: Discharge Planning  Goal: Discharge to home or other facility with appropriate resources  4/8/2024 2156 by Lucas Doyle RN  Outcome: Progressing  4/8/2024 1547 by Divya Kilgore RN  Outcome: Progressing     Problem: Pain  Goal: Verbalizes/displays adequate comfort level or baseline comfort level  4/8/2024 2156 by Lucas Doyle RN  Outcome: Progressing  4/8/2024 1547 by Divya Kilgore RN  Outcome: Progressing     Problem: Safety - Adult  Goal: Free from fall injury  4/8/2024 2156 by Lucas Doyle RN  Outcome: Progressing  4/8/2024 1547 by Divya Kilgore RN  Outcome: Progressing     Problem: Skin/Tissue Integrity  Goal: Absence of new skin breakdown  Description: 1.  Monitor for areas of redness and/or skin breakdown  2.  Assess vascular access sites hourly  3.  Every 4-6 hours minimum:  Change oxygen saturation probe site  4.  Every 4-6 hours:  If on nasal continuous positive airway pressure, respiratory therapy assess nares and determine need for appliance change or resting period.  4/8/2024 2156 by Lucas Doyle RN  Outcome: Progressing  4/8/2024 1547 by Divya Kilgore RN  Outcome: Progressing     Problem: Chronic Conditions and Co-morbidities  Goal: Patient's chronic conditions and co-morbidity symptoms are monitored and maintained or improved  4/8/2024 2156 by Lucas Doyle RN  Outcome: Progressing  4/8/2024 1547 by Divya Kilgore RN  Outcome: Progressing  4/8/2024 1000 by Olive Boland RN  Outcome: Progressing     
  Problem: Discharge Planning  Goal: Discharge to home or other facility with appropriate resources  5/1/2024 0017 by Glenny Naylor RN  Outcome: Progressing  Flowsheets (Taken 4/30/2024 2057)  Discharge to home or other facility with appropriate resources: Identify barriers to discharge with patient and caregiver  4/30/2024 1459 by Cher Lay RN  Outcome: Progressing     Problem: Pain  Goal: Verbalizes/displays adequate comfort level or baseline comfort level  Outcome: Progressing     Problem: Safety - Adult  Goal: Free from fall injury  5/1/2024 0017 by Glenny Naylor RN  Outcome: Progressing  4/30/2024 1459 by Cher Lay RN  Outcome: Progressing     Problem: Skin/Tissue Integrity  Goal: Absence of new skin breakdown  Description: 1.  Monitor for areas of redness and/or skin breakdown  2.  Assess vascular access sites hourly  3.  Every 4-6 hours minimum:  Change oxygen saturation probe site  4.  Every 4-6 hours:  If on nasal continuous positive airway pressure, respiratory therapy assess nares and determine need for appliance change or resting period.  5/1/2024 0017 by Glenny Naylor RN  Outcome: Progressing  4/30/2024 1459 by Cher Lay RN  Outcome: Progressing     Problem: Chronic Conditions and Co-morbidities  Goal: Patient's chronic conditions and co-morbidity symptoms are monitored and maintained or improved  5/1/2024 0017 by Glenny Naylor RN  Outcome: Progressing  Flowsheets (Taken 4/30/2024 2057)  Care Plan - Patient's Chronic Conditions and Co-Morbidity Symptoms are Monitored and Maintained or Improved: Monitor and assess patient's chronic conditions and comorbid symptoms for stability, deterioration, or improvement  4/30/2024 1459 by Cher Lay RN  Outcome: Progressing     Problem: Occupational Therapy - Adult  Goal: By Discharge: Performs self-care activities at highest level of function for planned discharge setting.  See evaluation for individualized goals.  Description: 
  Problem: Discharge Planning  Goal: Discharge to home or other facility with appropriate resources  5/7/2024 0939 by Ana Elaine RN  Outcome: Progressing  Flowsheets (Taken 5/7/2024 0840)  Discharge to home or other facility with appropriate resources:   Identify barriers to discharge with patient and caregiver   Arrange for needed discharge resources and transportation as appropriate  5/6/2024 2332 by Cecilia Grullon, RN  Outcome: Progressing  5/6/2024 2332 by Cecilia Grullon RN  Outcome: Progressing  Flowsheets (Taken 5/6/2024 2151)  Discharge to home or other facility with appropriate resources: Identify barriers to discharge with patient and caregiver     Problem: Pain  Goal: Verbalizes/displays adequate comfort level or baseline comfort level  5/7/2024 0939 by Ana Elaine RN  Outcome: Progressing  5/6/2024 2332 by Cecilia Grullon RN  Outcome: Progressing  5/6/2024 2332 by Cecilia Grullon RN  Outcome: HH/HSPC Not Progressing     Problem: Safety - Adult  Goal: Free from fall injury  5/7/2024 0939 by Ana Elaine RN  Outcome: Progressing  5/6/2024 2332 by Cecilia Grullon RN  Outcome: Progressing  5/6/2024 2332 by Cecilia Grullon RN  Outcome: HH/HSPC Not Progressing     Problem: Skin/Tissue Integrity  Goal: Absence of new skin breakdown  Description: 1.  Monitor for areas of redness and/or skin breakdown  2.  Assess vascular access sites hourly  3.  Every 4-6 hours minimum:  Change oxygen saturation probe site  4.  Every 4-6 hours:  If on nasal continuous positive airway pressure, respiratory therapy assess nares and determine need for appliance change or resting period.  5/7/2024 0939 by Ana Elaine RN  Outcome: Progressing  5/6/2024 2332 by Cecilia Grullon, RN  Outcome: Progressing  5/6/2024 2332 by Cecilia Grullon, RN  Outcome: HH/HSPC Not Progressing     Problem: Chronic Conditions and Co-morbidities  Goal: Patient's chronic conditions and 
  Problem: Discharge Planning  Goal: Discharge to home or other facility with appropriate resources  5/7/2024 1258 by Ana Elaine RN  Outcome: Completed  5/7/2024 0939 by Ana Elaine RN  Outcome: Progressing  Flowsheets (Taken 5/7/2024 0840)  Discharge to home or other facility with appropriate resources:   Identify barriers to discharge with patient and caregiver   Arrange for needed discharge resources and transportation as appropriate  5/6/2024 2332 by Cecilia Grullon RN  Outcome: Progressing  5/6/2024 2332 by Cecilia Grullon RN  Outcome: Progressing  Flowsheets (Taken 5/6/2024 2151)  Discharge to home or other facility with appropriate resources: Identify barriers to discharge with patient and caregiver     Problem: Pain  Goal: Verbalizes/displays adequate comfort level or baseline comfort level  5/7/2024 1258 by Ana Elaine RN  Outcome: Completed  5/7/2024 0939 by Ana Elaine RN  Outcome: Progressing  5/6/2024 2332 by Cecilia Grullon RN  Outcome: Progressing  5/6/2024 2332 by Cecilia Grullon, RN  Outcome: HH/HSPC Not Progressing     Problem: Safety - Adult  Goal: Free from fall injury  5/7/2024 1258 by Ana Elaine RN  Outcome: Completed  5/7/2024 0939 by Ana Elaine RN  Outcome: Progressing  5/6/2024 2332 by Cecilia Grullon, RN  Outcome: Progressing  5/6/2024 2332 by Cecilia Grullon, RN  Outcome: HH/HSPC Not Progressing     Problem: Skin/Tissue Integrity  Goal: Absence of new skin breakdown  Description: 1.  Monitor for areas of redness and/or skin breakdown  2.  Assess vascular access sites hourly  3.  Every 4-6 hours minimum:  Change oxygen saturation probe site  4.  Every 4-6 hours:  If on nasal continuous positive airway pressure, respiratory therapy assess nares and determine need for appliance change or resting period.  5/7/2024 1258 by Ana Elaine RN  Outcome: Completed  5/7/2024 0939 by Ana Elaine RN  Outcome: 
  Problem: Discharge Planning  Goal: Discharge to home or other facility with appropriate resources  Outcome: Progressing     Problem: Pain  Goal: Verbalizes/displays adequate comfort level or baseline comfort level  4/30/2024 0137 by Carolina Valencia RN  Outcome: Progressing     Problem: Safety - Adult  Goal: Free from fall injury  4/30/2024 0137 by Carolina Valencia, RN  Outcome: Progressing     Problem: Skin/Tissue Integrity  Goal: Absence of new skin breakdown  Description: 1.  Monitor for areas of redness and/or skin breakdown  2.  Assess vascular access sites hourly  3.  Every 4-6 hours minimum:  Change oxygen saturation probe site  4.  Every 4-6 hours:  If on nasal continuous positive airway pressure, respiratory therapy assess nares and determine need for appliance change or resting period.  Outcome: Progressing     Problem: Chronic Conditions and Co-morbidities  Goal: Patient's chronic conditions and co-morbidity symptoms are monitored and maintained or improved  4/30/2024 0137 by Carolina Valencia, RN  Outcome: Progressing     
  Problem: Discharge Planning  Goal: Discharge to home or other facility with appropriate resources  Outcome: Progressing     Problem: Pain  Goal: Verbalizes/displays adequate comfort level or baseline comfort level  Outcome: Progressing     Problem: Safety - Adult  Goal: Free from fall injury  4/14/2024 1028 by Elodia Puente, RN  Outcome: Progressing  4/14/2024 0229 by Fiona Coyle, RN  Outcome: Progressing     Problem: Skin/Tissue Integrity  Goal: Absence of new skin breakdown  Description: 1.  Monitor for areas of redness and/or skin breakdown  2.  Assess vascular access sites hourly  3.  Every 4-6 hours minimum:  Change oxygen saturation probe site  4.  Every 4-6 hours:  If on nasal continuous positive airway pressure, respiratory therapy assess nares and determine need for appliance change or resting period.  Outcome: Progressing     Problem: Chronic Conditions and Co-morbidities  Goal: Patient's chronic conditions and co-morbidity symptoms are monitored and maintained or improved  Outcome: Progressing     Problem: Nutrition Deficit:  Goal: Optimize nutritional status  Outcome: Progressing     
  Problem: Discharge Planning  Goal: Discharge to home or other facility with appropriate resources  Outcome: Progressing     Problem: Pain  Goal: Verbalizes/displays adequate comfort level or baseline comfort level  Outcome: Progressing     Problem: Safety - Adult  Goal: Free from fall injury  Outcome: Progressing     Problem: Skin/Tissue Integrity  Goal: Absence of new skin breakdown  Description: 1.  Monitor for areas of redness and/or skin breakdown  2.  Assess vascular access sites hourly  3.  Every 4-6 hours minimum:  Change oxygen saturation probe site  4.  Every 4-6 hours:  If on nasal continuous positive airway pressure, respiratory therapy assess nares and determine need for appliance change or resting period.  Outcome: Progressing     
  Problem: Discharge Planning  Goal: Discharge to home or other facility with appropriate resources  Outcome: Progressing     Problem: Pain  Goal: Verbalizes/displays adequate comfort level or baseline comfort level  Outcome: Progressing     Problem: Safety - Adult  Goal: Free from fall injury  Outcome: Progressing     Problem: Skin/Tissue Integrity  Goal: Absence of new skin breakdown  Description: 1.  Monitor for areas of redness and/or skin breakdown  2.  Assess vascular access sites hourly  3.  Every 4-6 hours minimum:  Change oxygen saturation probe site  4.  Every 4-6 hours:  If on nasal continuous positive airway pressure, respiratory therapy assess nares and determine need for appliance change or resting period.  Outcome: Progressing     Problem: Chronic Conditions and Co-morbidities  Goal: Patient's chronic conditions and co-morbidity symptoms are monitored and maintained or improved  Outcome: Progressing     
  Problem: Discharge Planning  Goal: Discharge to home or other facility with appropriate resources  Outcome: Progressing     Problem: Pain  Goal: Verbalizes/displays adequate comfort level or baseline comfort level  Outcome: Progressing     Problem: Safety - Adult  Goal: Free from fall injury  Outcome: Progressing     Problem: Skin/Tissue Integrity  Goal: Absence of new skin breakdown  Description: 1.  Monitor for areas of redness and/or skin breakdown  2.  Assess vascular access sites hourly  3.  Every 4-6 hours minimum:  Change oxygen saturation probe site  4.  Every 4-6 hours:  If on nasal continuous positive airway pressure, respiratory therapy assess nares and determine need for appliance change or resting period.  Outcome: Progressing     Problem: Chronic Conditions and Co-morbidities  Goal: Patient's chronic conditions and co-morbidity symptoms are monitored and maintained or improved  Outcome: Progressing     Problem: Occupational Therapy - Adult  Goal: By Discharge: Performs self-care activities at highest level of function for planned discharge setting.  See evaluation for individualized goals.  Description: Occupational Therapy Goals:  Initiated 4/9/2024 to be met within 7-10 days.    1.  Patient will perform lower body dressing with modified independence.   2.  Patient will perform grooming while standing at sink with modified independence.  3.  Patient will perform toilet transfers with modified independence.  4.  Patient will perform all aspects of toileting with modified independence.  5.  Patient will participate in upper extremity therapeutic exercise/activities with supervision/set-up for 8-10 minutes to increase strength/endurance for ADLs.    6.  Patient will utilize energy conservation techniques during functional activities with verbal cues.    PLOF: Pt lives w/ her spouse in a one story home, tub/shower w/ seat, and uses RW or quad cane for mobility. Pt states she is independent w/ ADLs, and 
  Problem: Discharge Planning  Goal: Discharge to home or other facility with appropriate resources  Outcome: Progressing     Problem: Pain  Goal: Verbalizes/displays adequate comfort level or baseline comfort level  Outcome: Progressing     Problem: Safety - Adult  Goal: Free from fall injury  Outcome: Progressing     Problem: Skin/Tissue Integrity  Goal: Absence of new skin breakdown  Description: 1.  Monitor for areas of redness and/or skin breakdown  2.  Assess vascular access sites hourly  3.  Every 4-6 hours minimum:  Change oxygen saturation probe site  4.  Every 4-6 hours:  If on nasal continuous positive airway pressure, respiratory therapy assess nares and determine need for appliance change or resting period.  Outcome: Progressing     Problem: Occupational Therapy - Adult  Goal: By Discharge: Performs self-care activities at highest level of function for planned discharge setting.  See evaluation for individualized goals.  Description: Occupational Therapy Goals:  Initiated 4/9/2024, Re-evaluated 4/18/2024 goals to be continued to be met within 7-10 days.    1.  Patient will perform lower body dressing with modified independence.   2.  Patient will perform grooming while standing at sink with modified independence.  3.  Patient will perform toilet transfers with modified independence.  4.  Patient will perform all aspects of toileting with modified independence.  5.  Patient will participate in upper extremity therapeutic exercise/activities with supervision/set-up for 8-10 minutes to increase strength/endurance for ADLs.    6.  Patient will utilize energy conservation techniques during functional activities with verbal cues.    PLOF: Pt lives w/ her spouse in a one story home, tub/shower w/ seat, and uses RW or quad cane for mobility. Pt states she is independent w/ ADLs, and has a personal care aide who comes for a few hours a day to assist w/ household management while her spouse is at work.    
  Problem: Discharge Planning  Goal: Discharge to home or other facility with appropriate resources  Outcome: Progressing     Problem: Pain  Goal: Verbalizes/displays adequate comfort level or baseline comfort level  Outcome: Progressing     Problem: Safety - Adult  Goal: Free from fall injury  Outcome: Progressing  Flowsheets (Taken 5/6/2024 1129)  Free From Fall Injury:   Instruct family/caregiver on patient safety   Based on caregiver fall risk screen, instruct family/caregiver to ask for assistance with transferring infant if caregiver noted to have fall risk factors     Problem: Skin/Tissue Integrity  Goal: Absence of new skin breakdown  Description: 1.  Monitor for areas of redness and/or skin breakdown  2.  Assess vascular access sites hourly  3.  Every 4-6 hours minimum:  Change oxygen saturation probe site  4.  Every 4-6 hours:  If on nasal continuous positive airway pressure, respiratory therapy assess nares and determine need for appliance change or resting period.  Outcome: Progressing     Problem: Chronic Conditions and Co-morbidities  Goal: Patient's chronic conditions and co-morbidity symptoms are monitored and maintained or improved  Outcome: Progressing     Problem: Nutrition Deficit:  Goal: Optimize nutritional status  Outcome: Progressing     
  Problem: Discharge Planning  Goal: Discharge to home or other facility with appropriate resources  Outcome: Progressing  Flowsheets (Taken 4/14/2024 2015)  Discharge to home or other facility with appropriate resources: Identify barriers to discharge with patient and caregiver     Problem: Pain  Goal: Verbalizes/displays adequate comfort level or baseline comfort level  Outcome: Progressing     Problem: Safety - Adult  Goal: Free from fall injury  Outcome: Progressing     Problem: Skin/Tissue Integrity  Goal: Absence of new skin breakdown  Description: 1.  Monitor for areas of redness and/or skin breakdown  2.  Assess vascular access sites hourly  3.  Every 4-6 hours minimum:  Change oxygen saturation probe site  4.  Every 4-6 hours:  If on nasal continuous positive airway pressure, respiratory therapy assess nares and determine need for appliance change or resting period.  Outcome: Progressing     Problem: Chronic Conditions and Co-morbidities  Goal: Patient's chronic conditions and co-morbidity symptoms are monitored and maintained or improved  Outcome: Progressing  Flowsheets (Taken 4/14/2024 2015)  Care Plan - Patient's Chronic Conditions and Co-Morbidity Symptoms are Monitored and Maintained or Improved: Monitor and assess patient's chronic conditions and comorbid symptoms for stability, deterioration, or improvement     Problem: Nutrition Deficit:  Goal: Optimize nutritional status  Outcome: Progressing     
  Problem: Discharge Planning  Goal: Discharge to home or other facility with appropriate resources  Outcome: Progressing  Flowsheets (Taken 4/25/2024 1815 by Ashleigh Gonsalez, RN)  Discharge to home or other facility with appropriate resources: Identify barriers to discharge with patient and caregiver     Problem: Pain  Goal: Verbalizes/displays adequate comfort level or baseline comfort level  Outcome: Progressing     Problem: Safety - Adult  Goal: Free from fall injury  Outcome: Progressing     Problem: Skin/Tissue Integrity  Goal: Absence of new skin breakdown  Description: 1.  Monitor for areas of redness and/or skin breakdown  2.  Assess vascular access sites hourly  3.  Every 4-6 hours minimum:  Change oxygen saturation probe site  4.  Every 4-6 hours:  If on nasal continuous positive airway pressure, respiratory therapy assess nares and determine need for appliance change or resting period.  Outcome: Progressing     Problem: Chronic Conditions and Co-morbidities  Goal: Patient's chronic conditions and co-morbidity symptoms are monitored and maintained or improved  Outcome: Progressing  Flowsheets (Taken 4/25/2024 1815 by Ashleigh Gonsalez, RN)  Care Plan - Patient's Chronic Conditions and Co-Morbidity Symptoms are Monitored and Maintained or Improved: Monitor and assess patient's chronic conditions and comorbid symptoms for stability, deterioration, or improvement     Problem: Nutrition Deficit:  Goal: Optimize nutritional status  Outcome: Not Progressing     
  Problem: Discharge Planning  Goal: Discharge to home or other facility with appropriate resources  Outcome: Progressing  Flowsheets (Taken 5/1/2024 2052 by Glenny Naylor RN)  Discharge to home or other facility with appropriate resources: Identify barriers to discharge with patient and caregiver     Problem: Pain  Goal: Verbalizes/displays adequate comfort level or baseline comfort level  Outcome: Progressing     Problem: Safety - Adult  Goal: Free from fall injury  Outcome: Progressing     Problem: Skin/Tissue Integrity  Goal: Absence of new skin breakdown  Description: 1.  Monitor for areas of redness and/or skin breakdown  2.  Assess vascular access sites hourly  3.  Every 4-6 hours minimum:  Change oxygen saturation probe site  4.  Every 4-6 hours:  If on nasal continuous positive airway pressure, respiratory therapy assess nares and determine need for appliance change or resting period.  Outcome: Progressing     Problem: Occupational Therapy - Adult  Goal: By Discharge: Performs self-care activities at highest level of function for planned discharge setting.  See evaluation for individualized goals.  Description: Occupational Therapy Goals:  Re-evaluated 4/28/2024, pt recently extubated, goals updated to reflect current functional status, to be met within 7-10 days.    1.  Patient will perform bed mobility for ADLs  with supervision.   2.  Patient will perform grooming while sitting at EOB with Good balance with Supervision/set-up.  3.  Patient will perform bathing with supervision/set-up.  4.  Patient will perform lower body dressing with minimal assistance using AE prn.  5.  Patient will perform toilet transfers with minimal assistance.  6.  Patient will perform all aspects of toileting with minimal assistance.  7.  Patient will participate in upper extremity therapeutic exercise/activities with supervision/set-up for 8-10 minutes to increase strength/endurance for ADLs.    8.  Patient will utilize 
  Problem: Discharge Planning  Goal: Discharge to home or other facility with appropriate resources  Outcome: Progressing  Flowsheets (Taken 5/2/2024 0946 by Toy Sanchez, RN)  Discharge to home or other facility with appropriate resources:   Identify barriers to discharge with patient and caregiver   Arrange for needed discharge resources and transportation as appropriate     Problem: Pain  Goal: Verbalizes/displays adequate comfort level or baseline comfort level  Outcome: Progressing     Problem: Safety - Adult  Goal: Free from fall injury  Outcome: Progressing     Problem: Skin/Tissue Integrity  Goal: Absence of new skin breakdown  Description: 1.  Monitor for areas of redness and/or skin breakdown  2.  Assess vascular access sites hourly  3.  Every 4-6 hours minimum:  Change oxygen saturation probe site  4.  Every 4-6 hours:  If on nasal continuous positive airway pressure, respiratory therapy assess nares and determine need for appliance change or resting period.  Outcome: Progressing     Problem: Chronic Conditions and Co-morbidities  Goal: Patient's chronic conditions and co-morbidity symptoms are monitored and maintained or improved  Outcome: Progressing  Flowsheets (Taken 5/2/2024 0946 by Toy Sanchez, RN)  Care Plan - Patient's Chronic Conditions and Co-Morbidity Symptoms are Monitored and Maintained or Improved:   Monitor and assess patient's chronic conditions and comorbid symptoms for stability, deterioration, or improvement   Collaborate with multidisciplinary team to address chronic and comorbid conditions and prevent exacerbation or deterioration     
  Problem: Discharge Planning  Goal: Discharge to home or other facility with appropriate resources  Outcome: Progressing  Flowsheets (Taken 5/5/2024 1127)  Discharge to home or other facility with appropriate resources:   Identify barriers to discharge with patient and caregiver   Arrange for needed discharge resources and transportation as appropriate   Identify discharge learning needs (meds, wound care, etc)   Arrange for interpreters to assist at discharge as needed   Refer to discharge planning if patient needs post-hospital services based on physician order or complex needs related to functional status, cognitive ability or social support system     Problem: Pain  Goal: Verbalizes/displays adequate comfort level or baseline comfort level  Outcome: Progressing  Flowsheets (Taken 5/5/2024 0730)  Verbalizes/displays adequate comfort level or baseline comfort level:   Encourage patient to monitor pain and request assistance   Assess pain using appropriate pain scale   Administer analgesics based on type and severity of pain and evaluate response   Implement non-pharmacological measures as appropriate and evaluate response   Consider cultural and social influences on pain and pain management   Notify Licensed Independent Practitioner if interventions unsuccessful or patient reports new pain     Problem: Safety - Adult  Goal: Free from fall injury  Outcome: Progressing  Flowsheets (Taken 5/5/2024 0715)  Free From Fall Injury: Instruct family/caregiver on patient safety     Problem: Skin/Tissue Integrity  Goal: Absence of new skin breakdown  Description: 1.  Monitor for areas of redness and/or skin breakdown  2.  Assess vascular access sites hourly  3.  Every 4-6 hours minimum:  Change oxygen saturation probe site  4.  Every 4-6 hours:  If on nasal continuous positive airway pressure, respiratory therapy assess nares and determine need for appliance change or resting period.  Outcome: Progressing     Problem: 
  Problem: Nutrition Deficit:  Goal: Optimize nutritional status  Outcome: Not Progressing  Flowsheets (Taken 4/24/2024 1203 by Dockweiler, Megan, RD)  Nutrient intake appropriate for improving, restoring, or maintaining nutritional needs:   Assess nutritional status and recommend course of action   Monitor oral intake, labs, and treatment plans   Recommend appropriate diets, oral nutritional supplements, and vitamin/mineral supplements   Recommend, monitor, and adjust tube feedings and TPN/PPN based on assessed needs       Problem: Pain  Goal: Verbalizes/displays adequate comfort level or baseline comfort level  Outcome: Progressing     Problem: Safety - Adult  Goal: Free from fall injury  Outcome: Progressing     Problem: Skin/Tissue Integrity  Goal: Absence of new skin breakdown  Description: 1.  Monitor for areas of redness and/or skin breakdown  2.  Assess vascular access sites hourly  3.  Every 4-6 hours minimum:  Change oxygen saturation probe site  4.  Every 4-6 hours:  If on nasal continuous positive airway pressure, respiratory therapy assess nares and determine need for appliance change or resting period.  Outcome: Progressing     Problem: Safety - Medical Restraint  Goal: Remains free of injury from restraints (Restraint for Interference with Medical Device)  Description: INTERVENTIONS:  1. Determine that other, less restrictive measures have been tried or would not be effective before applying the restraint  2. Evaluate the patient's condition at the time of restraint application  3. Inform patient/family regarding the reason for restraint  4. Q2H: Monitor safety, psychosocial status, comfort, nutrition and hydration  Recent Flowsheet Documentation  Taken 4/24/2024 1000 by Rob Hackett RN  Remains free of injury from restraints (restraint for interference with medical device):   Determine that other, less restrictive measures have been tried or would not be effective before applying the 
  Problem: Occupational Therapy - Adult  Goal: By Discharge: Performs self-care activities at highest level of function for planned discharge setting.  See evaluation for individualized goals.  Description: Occupational Therapy Goals:  Initiated 4/9/2024 to be met within 7-10 days.    1.  Patient will perform lower body dressing with modified independence.   2.  Patient will perform grooming while standing at sink with modified independence.  3.  Patient will perform toilet transfers with modified independence.  4.  Patient will perform all aspects of toileting with modified independence.  5.  Patient will participate in upper extremity therapeutic exercise/activities with supervision/set-up for 8-10 minutes to increase strength/endurance for ADLs.    6.  Patient will utilize energy conservation techniques during functional activities with verbal cues.    PLOF: Pt lives w/ her spouse in a one story home, tub/shower w/ seat, and uses RW or quad cane for mobility. Pt states she is independent w/ ADLs, and has a personal care aide who comes for a few hours a day to assist w/ household management while her spouse is at work.      Outcome: Progressing     OCCUPATIONAL THERAPY EVALUATION    Patient: Christy Donahue (73 y.o. female)  Date: 4/9/2024  Primary Diagnosis: COPD exacerbation (HCC) [J44.1]  End stage renal disease on dialysis (HCC) [N18.6, Z99.2]  Acute respiratory failure with hypoxia (HCC) [J96.01]       Precautions: General Precautions, Fall Risk, Contact Precautions    ASSESSMENT :  Pt is lying semi supine in bed, agreeable to OT eval and tx. Pt is pleasant and cooperative during session, is easily distracted but can be redirected w/ VC's. Pt performed supine > sit EOB w/ SBA, and sits EOB w/ SBA. Pt dons socks while seated EOB w/ setup A. Pt washes her face w/ damp washcloth while seated EOB, SPV. Pt performs sit >stand w/ CGA, and ambulates ~5 ft w/ RW and CGA for safety in preparation for standing ADL tasks. 
  Problem: Occupational Therapy - Adult  Goal: By Discharge: Performs self-care activities at highest level of function for planned discharge setting.  See evaluation for individualized goals.  Description: Occupational Therapy Goals:  Initiated 4/9/2024 to be met within 7-10 days.    1.  Patient will perform lower body dressing with modified independence.   2.  Patient will perform grooming while standing at sink with modified independence.  3.  Patient will perform toilet transfers with modified independence.  4.  Patient will perform all aspects of toileting with modified independence.  5.  Patient will participate in upper extremity therapeutic exercise/activities with supervision/set-up for 8-10 minutes to increase strength/endurance for ADLs.    6.  Patient will utilize energy conservation techniques during functional activities with verbal cues.    PLOF: Pt lives w/ her spouse in a one story home, tub/shower w/ seat, and uses RW or quad cane for mobility. Pt states she is independent w/ ADLs, and has a personal care aide who comes for a few hours a day to assist w/ household management while her spouse is at work.      Outcome: Progressing   OCCUPATIONAL THERAPY TREATMENT    Patient: Christy Donahue (73 y.o. female)  Date: 4/15/2024  Diagnosis: COPD exacerbation ([facility]) [J44.1]  End stage renal disease on dialysis (HCC) [N18.6, Z99.2]  Acute respiratory failure with hypoxia (HCC) [J96.01] CHF (congestive heart failure), NYHA class I, acute on chronic, combined (HCC)      Precautions: General Precautions, Fall Risk, Contact Precautions,  ,  ,  ,  ,  ,  ,      Chart, occupational therapy assessment, plan of care, and goals were reviewed.  ASSESSMENT:  PT co tx to maximize pt safety and participation. Pt presented supine in bed upon entry, emotional, and required much encouragement for participation. Pt transitioned to EOB SBA w/ increased time 2/2 generalized weakness in prep for functional tasks. STS transfer MIN A 
  Problem: Occupational Therapy - Adult  Goal: By Discharge: Performs self-care activities at highest level of function for planned discharge setting.  See evaluation for individualized goals.  Description: Occupational Therapy Goals:  Initiated 4/9/2024, Re-evaluated 4/18/2024 goals to be continued to be met within 7-10 days.    1.  Patient will perform lower body dressing with modified independence.   2.  Patient will perform grooming while standing at sink with modified independence.  3.  Patient will perform toilet transfers with modified independence.  4.  Patient will perform all aspects of toileting with modified independence.  5.  Patient will participate in upper extremity therapeutic exercise/activities with supervision/set-up for 8-10 minutes to increase strength/endurance for ADLs.    6.  Patient will utilize energy conservation techniques during functional activities with verbal cues.    PLOF: Pt lives w/ her spouse in a one story home, tub/shower w/ seat, and uses RW or quad cane for mobility. Pt states she is independent w/ ADLs, and has a personal care aide who comes for a few hours a day to assist w/ household management while her spouse is at work.      Outcome: Progressing     OCCUPATIONAL THERAPY TREATMENT    Patient: Christy Donahue (73 y.o. female)  Date: 4/21/2024  Diagnosis: COPD exacerbation ([facility]) [J44.1]  End stage renal disease on dialysis ([facility]) [N18.6, Z99.2]  Acute respiratory failure with hypoxia ([facility]) [J96.01] CHF (congestive heart failure), NYHA class I, acute on chronic, combined ([facility])      Precautions: General Precautions, Fall Risk, Contact Precautions, Aspiration Risk    Chart, occupational therapy assessment, plan of care, and goals were reviewed.  ASSESSMENT:  Pt semi supine and agreeable to OT tx. Pt pleasant but tearful at times, reporting LUE pain due to dialysis Friday, and states RN is aware. Pt performed supine > sit EOB SPV. Pt agreeable to gentle AROM of LUE, and 
  Problem: Occupational Therapy - Adult  Goal: By Discharge: Performs self-care activities at highest level of function for planned discharge setting.  See evaluation for individualized goals.  Description: Occupational Therapy Goals:  Re-evaluated 4/28/2024, pt recently extubated, goals updated to reflect current functional status, to be met within 7-10 days.    1.  Patient will perform bed mobility for ADLs  with supervision.   2.  Patient will perform grooming while sitting at EOB with Good balance with Supervision/set-up.  3.  Patient will perform bathing with supervision/set-up.  4.  Patient will perform lower body dressing with minimal assistance using AE prn.  5.  Patient will perform toilet transfers with minimal assistance.  6.  Patient will perform all aspects of toileting with minimal assistance.  7.  Patient will participate in upper extremity therapeutic exercise/activities with supervision/set-up for 8-10 minutes to increase strength/endurance for ADLs.    8.  Patient will utilize energy conservation techniques during functional activities with verbal cues.    PLOF: Pt lives w/ her spouse in a one story home, tub/shower w/ seat, and uses RW or quad cane for mobility. Pt states she is independent w/ ADLs, and has a personal care aide who comes for a few hours a day to assist w/ household management while her spouse is at work.      Outcome: Progressing     OCCUPATIONAL THERAPY TREATMENT    Patient: Christy Donahue (73 y.o. female)  Date: 5/1/2024  Diagnosis: COPD exacerbation (HCC) [J44.1]  End stage renal disease on dialysis (HCC) [N18.6, Z99.2]  Acute respiratory failure with hypoxia (HCC) [J96.01] Acute on chronic diastolic (congestive) heart failure ([facility])  Procedure(s) (LRB):  Vena cava filter insertion - cath lab (N/A)    Precautions: Fall Risk, Contact Precautions, Aspiration Risk    Chart, occupational therapy assessment, plan of care, and goals were reviewed.  ASSESSMENT:  RN clears pt for OT tx. 
  Problem: Occupational Therapy - Adult  Goal: By Discharge: Performs self-care activities at highest level of function for planned discharge setting.  See evaluation for individualized goals.  Description: Occupational Therapy Goals:  Re-evaluated 4/28/2024, pt recently extubated, goals updated to reflect current functional status, to be met within 7-10 days.    1.  Patient will perform bed mobility for ADLs  with supervision.   2.  Patient will perform grooming while sitting at EOB with Good balance with Supervision/set-up.  3.  Patient will perform bathing with supervision/set-up.  4.  Patient will perform lower body dressing with minimal assistance using AE prn.  5.  Patient will perform toilet transfers with minimal assistance.  6.  Patient will perform all aspects of toileting with minimal assistance.  7.  Patient will participate in upper extremity therapeutic exercise/activities with supervision/set-up for 8-10 minutes to increase strength/endurance for ADLs.    8.  Patient will utilize energy conservation techniques during functional activities with verbal cues.    PLOF: Pt lives w/ her spouse in a one story home, tub/shower w/ seat, and uses RW or quad cane for mobility. Pt states she is independent w/ ADLs, and has a personal care aide who comes for a few hours a day to assist w/ household management while her spouse is at work.      Outcome: Progressing   OCCUPATIONAL THERAPY TREATMENT    Patient: Christy Donahue (73 y.o. female)  Date: 4/30/2024  Diagnosis: COPD exacerbation ([facility]) [J44.1]  End stage renal disease on dialysis ([facility]) [N18.6, Z99.2]  Acute respiratory failure with hypoxia ([facility]) [J96.01] Acute on chronic diastolic (congestive) heart failure ([facility])  Procedure(s) (LRB):  Vena cava filter insertion - cath lab (N/A)    Precautions: Fall Risk, Contact Precautions, Aspiration Risk,  ,  ,  ,  ,  ,  ,      Chart, occupational therapy assessment, plan of care, and goals were reviewed.  ASSESSMENT:  Pt 
  Problem: Pain  Goal: Verbalizes/displays adequate comfort level or baseline comfort level  5/1/2024 1119 by Cher Lay RN  Outcome: Progressing  5/1/2024 0017 by Glenny Naylor RN  Outcome: Progressing     Problem: Safety - Adult  Goal: Free from fall injury  5/1/2024 1119 by Cher Lay RN  Outcome: Progressing  5/1/2024 0017 by Glenny Naylor RN  Outcome: Progressing     Problem: Skin/Tissue Integrity  Goal: Absence of new skin breakdown  Description: 1.  Monitor for areas of redness and/or skin breakdown  2.  Assess vascular access sites hourly  3.  Every 4-6 hours minimum:  Change oxygen saturation probe site  4.  Every 4-6 hours:  If on nasal continuous positive airway pressure, respiratory therapy assess nares and determine need for appliance change or resting period.  5/1/2024 1119 by Cher Lay RN  Outcome: Progressing  5/1/2024 0017 by Glenny Naylor RN  Outcome: Progressing     Problem: Chronic Conditions and Co-morbidities  Goal: Patient's chronic conditions and co-morbidity symptoms are monitored and maintained or improved  5/1/2024 1119 by Cher Lay RN  Outcome: Progressing  5/1/2024 0017 by Glenny Naylor RN  Outcome: Progressing  Flowsheets (Taken 4/30/2024 2057)  Care Plan - Patient's Chronic Conditions and Co-Morbidity Symptoms are Monitored and Maintained or Improved: Monitor and assess patient's chronic conditions and comorbid symptoms for stability, deterioration, or improvement     
  Problem: Pain  Goal: Verbalizes/displays adequate comfort level or baseline comfort level  Outcome: Progressing     Problem: Safety - Adult  Goal: Free from fall injury  Outcome: Progressing     Problem: Chronic Conditions and Co-morbidities  Goal: Patient's chronic conditions and co-morbidity symptoms are monitored and maintained or improved  Outcome: Progressing     
  Problem: Pain  Goal: Verbalizes/displays adequate comfort level or baseline comfort level  Outcome: Progressing     Problem: Safety - Adult  Goal: Free from fall injury  Outcome: Progressing     Problem: Skin/Tissue Integrity  Goal: Absence of new skin breakdown  Description: 1.  Monitor for areas of redness and/or skin breakdown  2.  Assess vascular access sites hourly  3.  Every 4-6 hours minimum:  Change oxygen saturation probe site  4.  Every 4-6 hours:  If on nasal continuous positive airway pressure, respiratory therapy assess nares and determine need for appliance change or resting period.  Outcome: Progressing     
  Problem: Pain  Goal: Verbalizes/displays adequate comfort level or baseline comfort level  Outcome: Progressing     Problem: Safety - Adult  Goal: Free from fall injury  Outcome: Progressing     Problem: Skin/Tissue Integrity  Goal: Absence of new skin breakdown  Description: 1.  Monitor for areas of redness and/or skin breakdown  2.  Assess vascular access sites hourly  3.  Every 4-6 hours minimum:  Change oxygen saturation probe site  4.  Every 4-6 hours:  If on nasal continuous positive airway pressure, respiratory therapy assess nares and determine need for appliance change or resting period.  Outcome: Progressing     Problem: Chronic Conditions and Co-morbidities  Goal: Patient's chronic conditions and co-morbidity symptoms are monitored and maintained or improved  4/10/2024 0924 by Mey Yu RN  Outcome: Progressing  Flowsheets  Taken 4/10/2024 0924 by Mey Yu RN  Care Plan - Patient's Chronic Conditions and Co-Morbidity Symptoms are Monitored and Maintained or Improved:   Monitor and assess patient's chronic conditions and comorbid symptoms for stability, deterioration, or improvement   Collaborate with multidisciplinary team to address chronic and comorbid conditions and prevent exacerbation or deterioration   Update acute care plan with appropriate goals if chronic or comorbid symptoms are exacerbated and prevent overall improvement and discharge  Taken 4/10/2024 0815 by Trudi Singleton RN  Care Plan - Patient's Chronic Conditions and Co-Morbidity Symptoms are Monitored and Maintained or Improved:   Monitor and assess patient's chronic conditions and comorbid symptoms for stability, deterioration, or improvement   Collaborate with multidisciplinary team to address chronic and comorbid conditions and prevent exacerbation or deterioration   Update acute care plan with appropriate goals if chronic or comorbid symptoms are exacerbated and prevent overall improvement and discharge     
  Problem: Pain  Goal: Verbalizes/displays adequate comfort level or baseline comfort level  Outcome: Progressing     Problem: Safety - Adult  Goal: Free from fall injury  Outcome: Progressing     Problem: Skin/Tissue Integrity  Goal: Absence of new skin breakdown  Description: 1.  Monitor for areas of redness and/or skin breakdown  2.  Assess vascular access sites hourly  3.  Every 4-6 hours minimum:  Change oxygen saturation probe site  4.  Every 4-6 hours:  If on nasal continuous positive airway pressure, respiratory therapy assess nares and determine need for appliance change or resting period.  Outcome: Progressing     Problem: Chronic Conditions and Co-morbidities  Goal: Patient's chronic conditions and co-morbidity symptoms are monitored and maintained or improved  4/27/2024 0109 by Jodi Reis, RN  Outcome: Progressing  4/26/2024 1438 by Mey Yu, RN  Outcome: Progressing  Flowsheets (Taken 4/26/2024 1438)  Care Plan - Patient's Chronic Conditions and Co-Morbidity Symptoms are Monitored and Maintained or Improved:   Monitor and assess patient's chronic conditions and comorbid symptoms for stability, deterioration, or improvement   Collaborate with multidisciplinary team to address chronic and comorbid conditions and prevent exacerbation or deterioration   Update acute care plan with appropriate goals if chronic or comorbid symptoms are exacerbated and prevent overall improvement and discharge     
  Problem: Pain  Goal: Verbalizes/displays adequate comfort level or baseline comfort level  Outcome: Progressing     Problem: Safety - Adult  Goal: Free from fall injury  Outcome: Progressing     Problem: Skin/Tissue Integrity  Goal: Absence of new skin breakdown  Description: 1.  Monitor for areas of redness and/or skin breakdown  2.  Assess vascular access sites hourly  3.  Every 4-6 hours minimum:  Change oxygen saturation probe site  4.  Every 4-6 hours:  If on nasal continuous positive airway pressure, respiratory therapy assess nares and determine need for appliance change or resting period.  Outcome: Progressing     Problem: Chronic Conditions and Co-morbidities  Goal: Patient's chronic conditions and co-morbidity symptoms are monitored and maintained or improved  Outcome: Progressing     
  Problem: Physical Therapy - Adult  Goal: By Discharge: Performs mobility at highest level of function for planned discharge setting.  See evaluation for individualized goals.  Description: Physical Therapy Goals  Initiated 4/9/2024 and to be accomplished within 7 day(s)  1.  Patient will move from supine to sit and sit to supine , scoot up and down, and roll side to side in bed with modified independence.    2.  Patient will transfer from bed to chair and chair to bed with supervision/set-up using the least restrictive device.  3.  Patient will perform sit to stand with supervision/set-up.  4.  Patient will ambulate with supervision/set-up for 35 feet with the least restrictive device.   5.  Patient will ascend/descend 4 stairs with B handrail(s) with minimal assistance/contact guard assist.    PLOF: Mod (I), uses quad cane and RW for ambulation. Lives with  in a single level house, 4 NÉSTOR with BHR    Outcome: Progressing       PHYSICAL THERAPY TREATMENT    Patient: Christy Donahue (73 y.o. female)  Date: 5/1/2024  Diagnosis: COPD exacerbation ([facility]) [J44.1]  End stage renal disease on dialysis ([facility]) [N18.6, Z99.2]  Acute respiratory failure with hypoxia ([facility]) [J96.01] Acute on chronic diastolic (congestive) heart failure ([facility])  Procedure(s) (LRB):  Vena cava filter insertion - cath lab (N/A)    Precautions: Fall Risk, Contact Precautions, Aspiration Risk,  ,  ,  ,  ,  ,  ,      ASSESSMENT:  Patient seen for PT follow up session with focus on progressing OOB mobility. Received supine; agreeable with maximal encouragement. Reporting elevated fear of falling, requiring extensive education on benefits of OOB mobility, detriments of bedrest and therapeutic purpose of activities chosen. Alejandro required for bed mobility and CGA-Alejandro provided for balance tasks at EOB. Ultimately fatigued, requesting return to supine. Continue PT POC.    Progression toward goals:   []      Improving appropriately and progressing toward 
  Problem: Physical Therapy - Adult  Goal: By Discharge: Performs mobility at highest level of function for planned discharge setting.  See evaluation for individualized goals.  Description: Physical Therapy Goals  Initiated 4/9/2024 and to be accomplished within 7 day(s)  1.  Patient will move from supine to sit and sit to supine , scoot up and down, and roll side to side in bed with modified independence.    2.  Patient will transfer from bed to chair and chair to bed with supervision/set-up using the least restrictive device.  3.  Patient will perform sit to stand with supervision/set-up.  4.  Patient will ambulate with supervision/set-up for 35 feet with the least restrictive device.   5.  Patient will ascend/descend 4 stairs with B handrail(s) with minimal assistance/contact guard assist.    PLOF: Mod (I), uses quad cane and RW for ambulation. Lives with  in a single level house, 4 NÉSTOR with BHR    Outcome: Progressing     PHYSICAL THERAPY TREATMENT    Patient: Christy Donahue (73 y.o. female)  Date: 4/21/2024  Diagnosis: COPD exacerbation ([facility]) [J44.1]  End stage renal disease on dialysis ([facility]) [N18.6, Z99.2]  Acute respiratory failure with hypoxia ([facility]) [J96.01] CHF (congestive heart failure), NYHA class I, acute on chronic, combined ([facility])      Precautions: General Precautions, Fall Risk, Contact Precautions, Aspiration Risk      ASSESSMENT:  Pt received in bed and and agreeable. Endorses LUE pain 2/2 dialysis and noted increased bruising and edema. Resting HR noted to be 115 bpm. Pt SBA to sit on EOB. Request to amb to/from bathroom for toileting. Stands from EOB with SBA and utilizes own quad cane on the R for support. Given CGA for safety with gait 2/2 decreased step clearance and length. Pt needing min A to stand from low toilet seat with verbal cues for hand placement and anterior weight shifting. Again given CGA to amb back to bed, declines sitting up in recliner at this time. While seated EOB pt 
  Problem: Physical Therapy - Adult  Goal: By Discharge: Performs mobility at highest level of function for planned discharge setting.  See evaluation for individualized goals.  Description: Physical Therapy Goals  Initiated 4/9/2024 and to be accomplished within 7 day(s)  1.  Patient will move from supine to sit and sit to supine , scoot up and down, and roll side to side in bed with modified independence.    2.  Patient will transfer from bed to chair and chair to bed with supervision/set-up using the least restrictive device.  3.  Patient will perform sit to stand with supervision/set-up.  4.  Patient will ambulate with supervision/set-up for 35 feet with the least restrictive device.   5.  Patient will ascend/descend 4 stairs with B handrail(s) with minimal assistance/contact guard assist.    PLOF: Mod (I), uses quad cane and RW for ambulation. Lives with  in a single level house, 4 NÉSTOR with BHR    Outcome: Progressing   PHYSICAL THERAPY EVALUATION    Patient: Christy Donahue (73 y.o. female)  Date: 4/9/2024  Primary Diagnosis: COPD exacerbation ([facility]) [J44.1]  End stage renal disease on dialysis ([facility]) [N18.6, Z99.2]  Acute respiratory failure with hypoxia ([facility]) [J96.01]       Precautions: General Precautions, Fall Risk, Contact Precautions,  ,  ,  ,  ,  ,  ,    ASSESSMENT :  Received lying in bed on 3 L O2 via NC. Agreeable to PT eval upon arrival. Endorses R substernal pain, states nursing is aware. States that she uses both quad cane and RW for ambulation, but endorse she has had falls with the RW and prefers using the cane. Supine > sit EOB SBA. Sitting balance intact. L LE strength grossly 4-/5; knee pain noted with knee extension MMT. R LE strength grossly 4/5. Educated on B LE mobility to promote joint integrity. Sit > stand CGA. Takes several lateral steps EOB with R quad cane and CGA.  Displays fair-poor standing balance. Returned EOB and sit > supine SBA. Left lying in bed in NAD following mobility. 
  Problem: Physical Therapy - Adult  Goal: By Discharge: Performs mobility at highest level of function for planned discharge setting.  See evaluation for individualized goals.  Description: Physical Therapy Goals  Initiated 4/9/2024 and to be accomplished within 7 day(s)  Re-evaluated 5/5/24  1.  Patient will move from supine to sit and sit to supine , scoot up and down, and roll side to side in bed with modified independence.    2.  Patient will transfer from bed to chair and chair to bed with supervision/set-up using the least restrictive device.  3.  Patient will perform sit to stand with supervision/set-up.  4.  Patient will ambulate with supervision/set-up for 35 feet with the least restrictive device.   5.  Patient will ascend/descend 4 stairs with B handrail(s) with minimal assistance/contact guard assist.    PLOF: Mod (I), uses quad cane and RW for ambulation. Lives with  in a single level house, 4 NÉSTOR with BHR    Outcome: Progressing   PHYSICAL THERAPY RE-EVALUATION    Patient: Christy Donahue (73 y.o. female)  Date: 5/5/2024  Primary Diagnosis: COPD exacerbation ([facility]) [J44.1]  End stage renal disease on dialysis ([facility]) [N18.6, Z99.2]  Acute respiratory failure with hypoxia ([facility]) [J96.01]  Procedure(s) (LRB):  Vena cava filter insertion - cath lab (N/A)     Precautions: Fall Risk, Contact Precautions, Aspiration Risk,  ,  ,  ,  ,  ,  ,      ASSESSMENT :  Pt received in bed asleep, easily wakes to voice and knock on door. Pt seen alongside OT to maximize pt's safety and participation due to assist levels and fear of falling. Pt requires max encouragement and redirection throughout entire session, pt becomes tearful about overall mobility and is given words of encouragement. Pt mod A x 2 sup to sit, displays fair - to fair + seated balance ar EOB, mod A to scoot to get feet in contact with the floor. Pt displays 3/5 BLE knee extension, hip flexion, and ankle DF/PF with intact sensation. Pt tolerates 
  Problem: Safety - Adult  Goal: Free from fall injury  Outcome: Progressing     
  Problem: Safety - Adult  Goal: Free from fall injury  Outcome: Progressing     
INTERVENTION:  HEMODYNAMIC STABILIZATION  MAINTAIN BP WNL WHILE ON HD.     INTERVENTION:  FLUID MANAGEMENT  WILL ATTEMPT 2000 ML TOTAL FLUID REMOVAL AS TOLERATED.     INTERVENTION:  METABOLIC/ELECTROLYTE MANAGEMENT  2.0 POTASSIUM 2.5 CALCIUM DIALYSATE USED WITH HD TODAY.     INTERVENTION:  HEMODIALYSIS ACCESS SITE MANAGEMENT  LUE AVF ACCESSED USING 15G ASEPTIC TECHNIQUE.     GOAL:  SIGNS AND SYMPTOMS OF LISTED POTENTIAL PROBLEMS WILL BE ABSENT OR MANAGEABLE.     OUTCOME:  PROGRESSING.     HD PLANNED FOR 3 HOURS TODAY.      Problem: Chronic Conditions and Co-morbidities  Goal: Patient's chronic conditions and co-morbidity symptoms are monitored and maintained or improved  Outcome: Progressing  
INTERVENTION:  HEMODYNAMIC STABILIZATION  MAINTAIN BP WNL WHILE ON HD.     INTERVENTION:  FLUID MANAGEMENT  WILL ATTEMPT 2000 ML TOTAL FLUID REMOVAL AS TOLERATED.     INTERVENTION:  METABOLIC/ELECTROLYTE MANAGEMENT  2.0 POTASSIUM 2.5 CALCIUM DIALYSATE USED WITH HD TODAY.     INTERVENTION:  HEMODIALYSIS ACCESS SITE MANAGEMENT  LUE AVF ACCESSED WITH 15G NEEDLES USING ASEPTIC TECHNIQUE.     GOAL:  SIGNS AND SYMPTOMS OF LISTED POTENTIAL PROBLEMS WILL BE ABSENT OR MANAGEABLE.     OUTCOME:  PROGRESSING.     HD PLANNED FOR 3 HOURS TODAY.      Problem: Chronic Conditions and Co-morbidities  Goal: Patient's chronic conditions and co-morbidity symptoms are monitored and maintained or improved  4/8/2024 1000 by Olive Boland RN  Outcome: Progressing    
INTERVENTION:  HEMODYNAMIC STABILIZATION  MAINTAIN BP WNL WHILE ON HD.     INTERVENTION:  FLUID MANAGEMENT  WILL ATTEMPT 2000 ML TOTAL FLUID REMOVAL AS TOLERATED.     INTERVENTION:  METABOLIC/ELECTROLYTE MANAGEMENT  2.0 POTASSIUM 2.5 CALCIUM DIALYSATE USED WITH HD TODAY.     INTERVENTION:  HEMODIALYSIS ACCESS SITE MANAGEMENT  LUE AVF ACCESSED WITH 15G NEEDLES USING ASEPTIC TECHNIQUE.     GOAL:  SIGNS AND SYMPTOMS OF LISTED POTENTIAL PROBLEMS WILL BE ABSENT OR MANAGEABLE.     OUTCOME:  PROGRESSING.     HD PLANNED FOR 3 HOURS TODAY.      Problem: Chronic Conditions and Co-morbidities  Goal: Patient's chronic conditions and co-morbidity symptoms are monitored and maintained or improved  Outcome: Progressing     
INTERVENTION:  HEMODYNAMIC STABILIZATION  MAINTAIN BP WNL WHILE ON HD.     INTERVENTION:  FLUID MANAGEMENT  WILL ATTEMPT 3000 ML TOTAL FLUID REMOVAL AS TOLERATED.     INTERVENTION:  METABOLIC/ELECTROLYTE MANAGEMENT  2.0 POTASSIUM 2.5 CALCIUM DIALYSATE USED WITH HD TODAY.     INTERVENTION:  HEMODIALYSIS ACCESS SITE MANAGEMENT  LUE AVF ACCESSED WITH 15G NEEDLES USING ASEPTIC TECHNIQUE.     GOAL:  SIGNS AND SYMPTOMS OF LISTED POTENTIAL PROBLEMS WILL BE ABSENT OR MANAGEABLE.     OUTCOME:  PROGRESSING.     HD PLANNED FOR 3 HOURS TODAY.      Problem: Chronic Conditions and Co-morbidities  Goal: Patient's chronic conditions and co-morbidity symptoms are monitored and maintained or improved  5/3/2024 1730 by Olive Boland RN  Outcome: Progressing  
INTERVENTION:  HEMODYNAMIC STABILIZATION  MAINTAIN BP WNL WHILE ON HD.    INTERVENTION:  FLUID MANAGEMENT  WILL ATTEMPT 2500 ML TOTAL FLUID REMOVAL AS TOLERATED.    INTERVENTION:  METABOLIC/ELECTROLYTE MANAGEMENT  2.0 POTASSIUM 2.5 CALCIUM DIALYSATE USED WITH HD TODAY.    INTERVENTION:  HEMODIALYSIS ACCESS SITE MANAGEMENT  LEFT UPPER ARM AVF ACCESSED WITH 15G NEEDLE USING ASEPTIC TECHNIQUE.    GOAL:  SIGNS AND SYMPTOMS OF LISTED POTENTIAL PROBLEMS WILL BE ABSENT OR MANAGEABLE.    OUTCOME:  PROGRESSING.    HD PLANNED FOR 2 HOURS TODAY.        Problem: Chronic Conditions and Co-morbidities  Goal: Patient's chronic conditions and co-morbidity symptoms are monitored and maintained or improved  4/13/2024 1143 by Mey Yu RN  Outcome: Progressing  Flowsheets  Taken 4/13/2024 1143 by Mey Yu RN  Care Plan - Patient's Chronic Conditions and Co-Morbidity Symptoms are Monitored and Maintained or Improved:   Monitor and assess patient's chronic conditions and comorbid symptoms for stability, deterioration, or improvement   Collaborate with multidisciplinary team to address chronic and comorbid conditions and prevent exacerbation or deterioration   Update acute care plan with appropriate goals if chronic or comorbid symptoms are exacerbated and prevent overall improvement and discharge  Taken 4/13/2024 0800 by Ashleigh Gonsalez RN  Care Plan - Patient's Chronic Conditions and Co-Morbidity Symptoms are Monitored and Maintained or Improved: Monitor and assess patient's chronic conditions and comorbid symptoms for stability, deterioration, or improvement     
INTERVENTION:  HEMODYNAMIC STABILIZATION  MAINTAIN BP WNL WHILE ON HD.    INTERVENTION:  FLUID MANAGEMENT  WILL ATTEMPT 2500 ML TOTAL FLUID REMOVAL AS TOLERATED.    INTERVENTION:  METABOLIC/ELECTROLYTE MANAGEMENT  2.0 POTASSIUM 2.5 CALCIUM DIALYSATE USED WITH HD TODAY.    INTERVENTION:  HEMODIALYSIS ACCESS SITE MANAGEMENT  LEFT UPPER ARM AVF ACCESSED WITH 15G NEEDLE USING ASEPTIC TECHNIQUE.    GOAL:  SIGNS AND SYMPTOMS OF LISTED POTENTIAL PROBLEMS WILL BE ABSENT OR MANAGEABLE.    OUTCOME:  PROGRESSING.    HD PLANNED FOR 3 HOURS TODAY.        Problem: Chronic Conditions and Co-morbidities  Goal: Patient's chronic conditions and co-morbidity symptoms are monitored and maintained or improved  4/10/2024 0924 by Mey Yu RN  Outcome: Progressing  Flowsheets (Taken 4/10/2024 0924)  Care Plan - Patient's Chronic Conditions and Co-Morbidity Symptoms are Monitored and Maintained or Improved:   Monitor and assess patient's chronic conditions and comorbid symptoms for stability, deterioration, or improvement   Collaborate with multidisciplinary team to address chronic and comorbid conditions and prevent exacerbation or deterioration   Update acute care plan with appropriate goals if chronic or comorbid symptoms are exacerbated and prevent overall improvement and discharge  
INTERVENTION:  HEMODYNAMIC STABILIZATION  MAINTAIN BP WNL WHILE ON HD.    INTERVENTION:  FLUID MANAGEMENT  WILL ATTEMPT 2500 ML TOTAL FLUID REMOVAL AS TOLERATED.    INTERVENTION:  METABOLIC/ELECTROLYTE MANAGEMENT  3.0 POTASSIUM 2.5 CALCIUM DIALYSATE USED WITH HD TODAY.    INTERVENTION:  HEMODIALYSIS ACCESS SITE MANAGEMENT  LEFT UPPER ARM AVF ACCESSED WITH 15G NEEDLE USING ASEPTIC TECHNIQUE.    GOAL:  SIGNS AND SYMPTOMS OF LISTED POTENTIAL PROBLEMS WILL BE ABSENT OR MANAGEABLE.    OUTCOME:  PROGRESSING.    HD PLANNED FOR 3 HOURS TODAY.        Problem: Chronic Conditions and Co-morbidities  Goal: Patient's chronic conditions and co-morbidity symptoms are monitored and maintained or improved  Outcome: Progressing  Flowsheets (Taken 4/26/2024 1320)  Care Plan - Patient's Chronic Conditions and Co-Morbidity Symptoms are Monitored and Maintained or Improved:   Monitor and assess patient's chronic conditions and comorbid symptoms for stability, deterioration, or improvement   Collaborate with multidisciplinary team to address chronic and comorbid conditions and prevent exacerbation or deterioration   Update acute care plan with appropriate goals if chronic or comorbid symptoms are exacerbated and prevent overall improvement and discharge     
PHYSICAL THERAPY TREATMENT    Patient: Christy Donahue (73 y.o. female)  Date: 5/3/2024  Diagnosis: COPD exacerbation ([facility]) [J44.1]  End stage renal disease on dialysis ([facility]) [N18.6, Z99.2]  Acute respiratory failure with hypoxia ([facility]) [J96.01] Acute on chronic diastolic (congestive) heart failure ([facility])  Procedure(s) (LRB):  Vena cava filter insertion - cath lab (N/A)    Precautions: Fall Risk, Contact Precautions, Aspiration Risk    ASSESSMENT:  Nursing cleared patient for therapy; patient agreeable to light activity, reporting she doesn't have a lot of energy to sit EOB this session. Patient guided through exercise routine with focus on improving circulation, ROM and strength. Patient required definite rest breaks in between sets due to onset of fatigue + required min A for LE mobility to ensure optimal muscular engagement, as patient with a few bouts of compensation upon performing routine; noted weakness to RLE>LLE. Patient performed rolling routine-- demonstrated ability to roll bilateral directions with definite UE support and increased time to fully transition (S). Patient continued to decline EOB sitting post activity. No adverse response noted or reported at conclusion of session. Patient remained supine in bed with alarm activated, call bell within reach and with all needs met. Verbalized understanding on need to utilize call bell for all mobility.     Progression toward goals:   []      Improving appropriately and progressing toward goals  [x]      Improving slowly and progressing toward goals  []      Not making progress toward goals and plan of care will be adjusted     PLAN:  Patient continues to benefit from skilled intervention to address the above impairments.  Continue treatment per established plan of care.    Further Equipment Recommendations for Discharge: walker    [facility]: AM-PAC Inpatient Mobility Raw Score : 14      Current research shows that an AM-PAC score of 17 (13 without stairs) or less is 
Problem: Discharge Planning  Goal: Discharge to home or other facility with appropriate resources  4/17/2024 0006 by Fiona Coyle, RN  Outcome: Not Progressing    Pt requesting for ARU placement versus SNF.   
Problem: Physical Therapy - Adult  Goal: By Discharge: Performs mobility at highest level of function for planned discharge setting.  See evaluation for individualized goals.  Description: Physical Therapy Goals  Initiated 4/9/2024 and to be accomplished within 7 day(s)  1.  Patient will move from supine to sit and sit to supine , scoot up and down, and roll side to side in bed with modified independence.    2.  Patient will transfer from bed to chair and chair to bed with supervision/set-up using the least restrictive device.  3.  Patient will perform sit to stand with supervision/set-up.  4.  Patient will ambulate with supervision/set-up for 35 feet with the least restrictive device.   5.  Patient will ascend/descend 4 stairs with B handrail(s) with minimal assistance/contact guard assist.    PLOF: Mod (I), uses quad cane and RW for ambulation. Lives with  in a single level house, 4 [address] with BHR    Outcome: Progressing   PHYSICAL THERAPY TREATMENT    Patient: Christy Donahue (73 y.o. female)  Date: 4/17/2024  Diagnosis: COPD exacerbation ([facility]) [J44.1]  End stage renal disease on dialysis ([facility]) [N18.6, Z99.2]  Acute respiratory failure with hypoxia ([facility]) [J96.01] CHF (congestive heart failure), NYHA class I, acute on chronic, combined ([facility])  Precautions: Fall Risk, Contact Precautions  ASSESSMENT:  Seated in bed. Incontinent of large amount of urine. Supervision for supine to sit. Seated EOB with supervision for balance. Completed seated reaching tasks with good balance. Min A for sit to stand. Cues for safe hand placement with ww and transfers. Amb 5ft with ww and min A. Loss of balance posteriorly with amb resulting in return to seated at EOB.  bpm in standing. SOB noted with activity. Completed second sit to stand for repositioning. Supervision for sit to supine. Decreased safety awareness. Seated in bed with RN Tanika present. Educated on need for RN assistance with mobility; verbalized understanding. 
Problem: Physical Therapy - Adult  Goal: By Discharge: Performs mobility at highest level of function for planned discharge setting.  See evaluation for individualized goals.  Description: Physical Therapy Goals  Initiated 4/9/2024 and to be accomplished within 7 day(s)  1.  Patient will move from supine to sit and sit to supine , scoot up and down, and roll side to side in bed with modified independence.    2.  Patient will transfer from bed to chair and chair to bed with supervision/set-up using the least restrictive device.  3.  Patient will perform sit to stand with supervision/set-up.  4.  Patient will ambulate with supervision/set-up for 35 feet with the least restrictive device.   5.  Patient will ascend/descend 4 stairs with B handrail(s) with minimal assistance/contact guard assist.    PLOF: Mod (I), uses quad cane and RW for ambulation. Lives with  in a single level house, 4 [address] with BHR    Outcome: Progressing   PHYSICAL THERAPY TREATMENT    Patient: Christy Donahue (73 y.o. female)  Date: 4/30/2024  Diagnosis: COPD exacerbation ([facility]) [J44.1]  End stage renal disease on dialysis ([facility]) [N18.6, Z99.2]  Acute respiratory failure with hypoxia ([facility]) [J96.01] Acute on chronic diastolic (congestive) heart failure ([facility])  Procedure(s) (LRB):  Vena cava filter insertion - cath lab (N/A)    Precautions: Fall Risk, Contact Precautions, Aspiration Risk  ASSESSMENT:  Oriented to self, time, and place. Encouragement to participate in PT. Poor tolerance. Max cues to maintain attentive to session. Mod A for supine to sit and sit to supine. Min/mod A to maintain seated balance at EOB. Cues for posterior lean and improved posture with no carryover. SOB noted with mobility. On room air, O2 saturation 93-98%. Unable to tolerate seated EOB greater than 3 minutes. Educated on benefits of mobility, role of PT, and ill effects of bedrest. Educated on need for RN assistance with mobility; verbalized understanding. Call bell in 
Problem: SLP Adult - Impaired Swallowing  Goal: By Discharge: Advance to least restrictive diet without signs or symptoms of aspiration for planned discharge setting.  See evaluation for individualized goals.  Description: Patient will:  1. Tolerate PO trials with 0 s/s overt distress in 4/5 trials- MET  2. Utilize compensatory swallow strategies/maneuvers (decrease bite/sip, size/rate, alt. liq/sol) with min cues in 4/5 trials- MET  3. Perform oral-motor/laryngeal exercises to increase oropharyngeal swallow function with min cues- N/A  4. Complete an objective swallow study (i.e., MBSS) to assess swallow integrity, r/o aspiration, and determine of safest LRD, min A as indicated/ordered by MD - N/A    Rec:     Easy to chew diet with thin liquids  Aspiration precautions  HOB >45 during po intake, remain >30 for 30-45 minutes after po   Small bites/sips; alternate liquid/solid with slow feeding rate   Oral care TID  Meds per pt preference  Outcome: Completed  SPEECH LANGUAGE PATHOLOGY DYSPHAGIA TREATMENT & DISCHARGE    Patient: Christy Donauhe (73 y.o. female)  Date: 5/2/2024  Diagnosis: COPD exacerbation ([facility]) [J44.1]  End stage renal disease on dialysis ([facility]) [N18.6, Z99.2]  Acute respiratory failure with hypoxia ([facility]) [J96.01] Acute on chronic diastolic (congestive) heart failure ([facility])  Procedure(s) (LRB):  Vena cava filter insertion - cath lab (N/A)    Precautions: Aspiration  PLOF: As per H&P     ASSESSMENT:  Pt seen for follow up dysphagia management. Pt reporting no difficulty swallowing since last tx. Pt tolerated reg solids and thin liquids + straw without s/s of aspiration. Labored mastication of reg solids requiring multiple liquid washes to clear. Function laryngeal elevation to palpation with all consistencies. Recommend continuation of easy to chew solids for ease of mastication and thin liquids, aspiration precautions, oral care TID, and meds as tolerated. Skilled SLP services are no longer indicated. Will 
Therapeutic Intervention: Decreased functional mobility ;Decreased endurance;Increased pain;Decreased balance;Decreased strength    Patient will benefit from skilled intervention to address the above impairments.  Patient's rehabilitation potential/ .  Factors which may influence rehabilitation potential include:   []         None noted  []         Mental ability/status  [x]         Medical condition  [x]         Home/family situation and support systems  [x]         Safety awareness  []         Pain tolerance/management  []         Other:      PLAN :  Recommendations and Planned Interventions:   [x]           Bed Mobility Training             [x]    Neuromuscular Re-Education  [x]           Transfer Training                   []    Orthotic/Prosthetic Training  [x]           Gait Training                          []    Modalities  [x]           Therapeutic Exercises           []    Edema Management/Control  [x]           Therapeutic Activities            [x]    Family Training/Education  [x]           Patient Education  []           Other (comment):    Frequency/Duration: Patient will be followed by physical therapy to address goals, 1-2 times per day/3-5 days per week to address goals.    Further Equipment Recommendations for Discharge: RW      Current research shows that an AM-PAC score of 17 (13 without stairs) or less is not associated with a discharge to the patient's home setting. Based on an AM-PAC score and their current functional mobility deficits, it is recommended that the patient have 3-5 sessions per week of Physical Therapy at d/c to increase the patient's independence.     This [facility] score should be considered in conjunction with interdisciplinary team recommendations to determine the most appropriate discharge setting. Patient's social support, diagnosis, medical stability, and prior level of function should also be taken into consideration.     SUBJECTIVE:   Patient stated “What is 
and vitamin/mineral supplements     
conditions and co-morbidity symptoms are monitored and maintained or improved  5/6/2024 2332 by Cecilia Grullon, RN  Outcome: Progressing  5/6/2024 2332 by Cecilia Grullon, RN  Outcome: HH/HSPC Not Progressing  Flowsheets (Taken 5/6/2024 2151)  Care Plan - Patient's Chronic Conditions and Co-Morbidity Symptoms are Monitored and Maintained or Improved: Monitor and assess patient's chronic conditions and comorbid symptoms for stability, deterioration, or improvement  5/6/2024 1130 by Olga Headley RN  Outcome: Progressing     Problem: Nutrition Deficit:  Goal: Optimize nutritional status  5/6/2024 2332 by Cecilia Grullon, RN  Outcome: Progressing  5/6/2024 2332 by Cecilia Grullon, RN  Outcome: HH/HSPC Not Progressing  5/6/2024 1130 by Olga Headley RN  Outcome: Progressing     
improvement   Collaborate with multidisciplinary team to address chronic and comorbid conditions and prevent exacerbation or deterioration     Problem: Nutrition Deficit:  Goal: Optimize nutritional status  Outcome: Progressing     
inpatient  Plan of Care discussed with: RN    Goals:  Previous Goal Met: Progressing toward Goal(s)  Goals: Meet at least 75% of estimated needs, by next RD assessment       Nutrition Monitoring and Evaluation:   Behavioral-Environmental Outcomes: None Identified  Food/Nutrient Intake Outcomes: Food and Nutrient Intake, Supplement Intake  Physical Signs/Symptoms Outcomes: Biochemical Data, GI Status, Fluid Status or Edema, Hemodynamic Status, Meal Time Behavior, Weight, Skin, Nutrition Focused Physical Findings    Discharge Planning:    Continue current diet     Mary Ly RD  Contact: [phone]    
Intervention:  Grooming: Stand by assistance  Face/hand hygiene and oral care at bed level    Toileting: Maximum assistance  At bed level    UE Therapeutic Exercises:   AROM BUE shoulder flexion  AROM BUE elbow flexion/extension    Pain:  Intensity Pre-treatment: 0/10   Intensity Post-treatment: 0/10    Activity Tolerance:    Fair, pt fatigues easily    After treatment:   []  Patient left in no apparent distress sitting up in chair  [x]  Patient left in no apparent distress in bed  [x]  Call bell left within reach  [x]  Nursing notified  []  Caregiver present  []  Bed alarm activated    COMMUNICATION/EDUCATION:   Patient Education  Education Given To: Patient  Education Provided: Plan of Care;ADL Adaptive Strategies;Energy Conservation  Education Method: Verbal;Teach Back  Barriers to Learning: None  Education Outcome: Continued education needed    Thank you for this referral.  KRISTI Kay  Minutes: 30  
- Number of Steps: 4  Entrance Stairs - Rails: Both  Bathroom Shower/Tub: Tub/Shower unit  Bathroom Toilet: Standard  Bathroom Equipment: Tub transfer bench  Bathroom Accessibility: Not accessible  Home Equipment: Cane, quad, Walker, rolling  Receives Help From: Family  ADL Assistance: Independent  Homemaking Assistance: Needs assistance  Meal Prep: Moderate  Laundry: Moderate  Vacuuming: Unable to assess (comment)  Cleaning: Moderate  Gardening: Unable to assess (comment)  Yard Work: Unable to assess (comment)  Driving: Total  Shopping: Moderate  : Unable to assess (comment)  Other (Comment): Unable to assess (comment)  Ambulation Assistance: Independent  Transfer Assistance: Independent  Active : No  Patient's  Info: spouse Lance Donahue  Mode of Transportation: Car  Occupation: Retired  Type of Occupation: [employer] employee ()  []  Right hand dominant   []  Left hand dominant    Cognitive/Behavioral Status:  Orientation  Overall Orientation Status: Within Functional Limits  Orientation Level: Oriented X4  Cognition  Overall Cognitive Status: Exceptions  Arousal/Alertness: Delayed responses to stimuli  Safety Judgement: Decreased awareness of need for assistance  Initiation: Requires cues for some  Sequencing: Requires cues for some    Skin: mult bruises  Edema: none noted    Vision/Perceptual:    Vision  Vision: Within Functional Limits         Coordination: BUE  Coordination: Generally decreased, functional        Balance:     Balance  Sitting: Impaired  Sitting - Static: Fair (occasional)  Sitting - Dynamic: Poor (constant support)    Strength: BUE  Strength: Generally decreased, functional    Tone & Sensation: BUE  Tone: Normal  Sensation: Impaired    Range of Motion: BUE  AROM: Within functional limits       Functional Mobility and Transfers for ADLs:  Bed Mobility:     Bed Mobility Training  Bed Mobility Training: Yes  Rolling: Supervision  Supine to Sit: Moderate assistance  Sit 
for vital signs taken during this treatment.    After treatment:   [] Patient left in no apparent distress sitting up in chair  [x] Patient left in no apparent distress in bed  [x] Call bell left within reach  [] Nursing notified  [] Caregiver present  [] Bed alarm activated    COMMUNICATION/EDUCATION:   Patient Education  Education Given To: Patient  Education Provided: Role of Therapy;Plan of Care;ADL Adaptive Strategies;Transfer Training;Energy Conservation;Fall Prevention Strategies  Education Method: Teach Back;Verbal  Barriers to Learning: None  Education Outcome: Continued education needed    Thank you for this referral.  Demian Hopkins OT  Minutes: 25      
you for this referral.  Brent Mead, PT  Minutes: 8   
referral.  Martha Sena, OTR/L  Minutes: 33

## 2024-05-07 NOTE — CARE COORDINATION
05/07/24 1135   IMM Letter   IMM Letter given to Patient/Family/Significant other/Guardian/POA/by: Yeny Patel   IMM Letter date given: 05/07/24   IMM Letter time given: 1130     Yeny Patel RN, BSN, [facility]  Case Management  [phone]

## 2024-05-08 LAB
BACTERIA SPEC CULT: NORMAL
SERVICE CMNT-IMP: NORMAL

## 2024-05-10 DIAGNOSIS — N18.6 ESRD (END STAGE RENAL DISEASE) (HCC): Primary | ICD-10-CM

## 2024-11-26 NOTE — ED PROVIDER NOTES
EMERGENCY DEPARTMENT HISTORY AND PHYSICAL EXAM    5:12 PM      Date: 1/21/2019  Patient Name: Kwadwo Worthy    History of Presenting Illness     Chief Complaint   Patient presents with    Chest Pain         History Provided By: Patient    Chief Complaint: chest pain  Duration: 4 Hours  Timing:  Acute  Location: l sided  Quality: Sharp  Severity: Moderate  Modifying Factors: palpation  Associated Symptoms: denies any other associated signs or symptoms      Additional History (Context): Kwadwo Worthy is a 79 y.o. female with DM, HTN, COPD, MI, dyslipidemia who presents with acute and constant sharp, L sided chest pain onset approximately 4 hours PTA. She states the pain is worse with palpation and denies any heavy lifting prior to onset. Pt had a previous MI on 5/20/18 and is worried pain is another MI. She is compliant with all medications. She seen Dr. Conrad Goff, cardiologist. No other concerns or symptoms at this time. PCP: Suzan Alvarado MD    Current Outpatient Medications   Medication Sig Dispense Refill    HYDROcodone-acetaminophen (NORCO) 5-325 mg per tablet Take 1-2 tablets PO every 4-6 hours as needed for pain control. If over the counter ibuprofen or acetaminophen was suggested, then only take the vicodin for pain not well controlled with the over the counter medication. 5 Tab 0    furosemide (LASIX) 40 mg tablet Take 0.5 Tabs by mouth daily. 1 Tab 0    sacubitril-valsartan (ENTRESTO) 49 mg/51 mg tablet Take 1 Tab by mouth two (2) times a day. 60 Tab 4    baclofen (LIORESAL) 10 mg tablet Take  by mouth three (3) times daily.  gabapentin (NEURONTIN) 300 mg capsule       raNITIdine (ZANTAC) 150 mg tablet Take 1 Tab by mouth two (2) times a day. 60 Tab 4    carvedilol (COREG) 6.25 mg tablet Take 1 Tab by mouth two (2) times daily (with meals). 60 Tab 6    leflunomide (ARAVA) 20 mg tablet Take 20 mg by mouth daily.       ticagrelor (BRILINTA) 90 mg tablet Take 1 Tab by mouth two (2) times a day. 60 Tab 11    atorvastatin (LIPITOR) 20 mg tablet Take 1 Tab by mouth nightly. 30 Tab 0    aspirin 81 mg chewable tablet Take 1 Tab by mouth daily. 30 Tab 0    insulin detemir (LEVEMIR) 100 unit/mL injection 15 units daily for diabetes 2 Vial 0    albuterol (PROVENTIL HFA, VENTOLIN HFA, PROAIR HFA) 90 mcg/actuation inhaler Take 2 Puffs by inhalation every four (4) hours as needed for Wheezing. 1 Inhaler 3    desloratadine (CLARINEX) 5 mg tablet Take 1 Tab by mouth daily as needed for Allergies. 30 Tab 0    mometasone (NASONEX) 50 mcg/actuation nasal spray 2 Sprays by Both Nostrils route daily as needed. For allergies 3 Container 3    umeclidinium-vilanterol (ANORO ELLIPTA) 62.5-25 mcg/actuation inhaler Take 1 Puff by inhalation daily. For COPD 3 Inhaler 3    albuterol (PROVENTIL VENTOLIN) 2.5 mg /3 mL (0.083 %) nebulizer solution 3 mL by Nebulization route every four (4) hours as needed for Wheezing. 60 Each 3    ferrous sulfate 325 mg (65 mg iron) tablet Take 1 Tab by mouth two (2) times a day. Indications: IRON DEFICIENCY ANEMIA 60 Tab 11       Past History     Past Medical History:  Past Medical History:   Diagnosis Date    Abnormal WBC count 5/17/2016    Bilateral shoulder pain 9/27/2016    Chondromalacia of both patellae     COPD (chronic obstructive pulmonary disease) ([facility] [address] 9/2015    mild-mod dz; Dr Radha Chen    Diabetes [facility]) 2013    Diabetic eye exam ([facility] [address] 2016    Dilated cardiomyopathy ([facility] [address]     Dyslipidemia     Heart attack ([facility] [address]     History of echocardiogram 11/14/2014    Mild LVE. EF 40%. Mild, diffuse hypk. Mild LAE.   Mild MR.      Hypercholesteremia 1/08/14    Hypertension 2000    Noncompliance with medications 2/16/2016    Obesity     Orthostatic hypotension 5/17/2016    Osteoarthritis of both knees     Pain management 04/01/2016    Dr. Jose Amaya Popliteal cyst, bilateral      Vitamin D deficiency 10/16/14       Past Surgical History:  Past Surgical History:   Procedure Laterality Date    HX HEART CATHETERIZATION      HX TUBAL LIGATION         Family History:  Family History   Problem Relation Age of Onset    Diabetes Mother     Hypertension Mother     Hypertension Father     Kidney Disease Maternal Aunt 48        Dialysis       Social History:  Social History     Tobacco Use    Smoking status: Former Smoker     Packs/day: 0.25     Years: 48.00     Pack years: 12.00     Types: Cigarettes     Last attempt to quit: 2018     Years since quittin.6    Smokeless tobacco: Never Used   Substance Use Topics    Alcohol use: No     Alcohol/week: 0.0 oz    Drug use: No       Allergies:  No Known Allergies      Review of Systems       Review of Systems   Constitutional: Negative. Negative for chills, diaphoresis and fever. HENT: Negative. Negative for congestion, rhinorrhea and sore throat. Eyes: Negative. Negative for pain, discharge and redness. Respiratory: Negative. Negative for cough, chest tightness, shortness of breath and wheezing. Cardiovascular: Positive for chest pain. Gastrointestinal: Negative. Negative for abdominal pain, constipation, diarrhea, nausea and vomiting. Genitourinary: Negative. Negative for dysuria, flank pain, frequency, hematuria and urgency. Musculoskeletal: Negative. Negative for back pain and neck pain. Skin: Negative. Negative for rash. Neurological: Negative. Negative for syncope, weakness, numbness and headaches. Psychiatric/Behavioral: Negative. All other systems reviewed and are negative. Physical Exam     Visit Vitals  /60   Pulse 74   Temp 98.1 °F (36.7 °C)   Resp 18   Wt 99.8 kg (220 lb)   SpO2 99%   BMI 37.76 kg/m²         Physical Exam   Constitutional: She appears well-developed and well-nourished. Non-toxic appearance. She does not have a sickly appearance. She does not appear ill. No distress. HENT:   Head: Normocephalic and atraumatic.    Mouth/Throat: Oropharynx is clear and moist. No oropharyngeal exudate. Eyes: Conjunctivae and EOM are normal. Pupils are equal, round, and reactive to light. No scleral icterus. Neck: Normal range of motion. Neck supple. No hepatojugular reflux and no JVD present. No tracheal deviation present. No thyromegaly present. Cardiovascular: Normal rate, regular rhythm, S1 normal, S2 normal, normal heart sounds, intact distal pulses and normal pulses. Exam reveals no gallop, no S3 and no S4. No murmur heard. Pulses:       Radial pulses are 2+ on the right side, and 2+ on the left side. Dorsalis pedis pulses are 2+ on the right side, and 2+ on the left side. Pulmonary/Chest: Effort normal and breath sounds normal. No respiratory distress. She has no decreased breath sounds. She has no wheezes. She has no rhonchi. She has no rales. Abdominal: Soft. Normal appearance and bowel sounds are normal. She exhibits no distension and no mass. There is no hepatosplenomegaly. There is no tenderness. There is no rigidity, no rebound, no guarding, no CVA tenderness, no tenderness at McBurney's point and negative Grey's sign. Musculoskeletal: Normal range of motion. Lymphadenopathy:        Head (right side): No submental, no submandibular, no preauricular and no occipital adenopathy present. Head (left side): No submental, no submandibular, no preauricular and no occipital adenopathy present. She has no cervical adenopathy. Right: No supraclavicular adenopathy present. Left: No supraclavicular adenopathy present. Neurological: She is alert. She has normal strength and normal reflexes. She is not disoriented. No cranial nerve deficit or sensory deficit. Coordination and gait normal. GCS eye subscore is 4. GCS verbal subscore is 5. GCS motor subscore is 6. Skin: Skin is warm, dry and intact. No rash noted. She is not diaphoretic. Psychiatric: She has a normal mood and affect.  Her speech is normal and behavior is normal. Judgment and thought content normal. Cognition and memory are normal.   Nursing note and vitals reviewed. Diagnostic Study Results     Labs -  Recent Results (from the past 12 hour(s))   CARDIAC PANEL,(CK, CKMB & TROPONIN)    Collection Time: 01/21/19  5:49 PM   Result Value Ref Range     26 - 192 U/L    CK - MB <1.0 <3.6 ng/ml    CK-MB Index  0.0 - 4.0 %     CALCULATION NOT PERFORMED WHEN RESULT IS BELOW LINEAR LIMIT    Troponin-I, QT <0.02 0.0 - 0.045 NG/ML   CBC WITH AUTOMATED DIFF    Collection Time: 01/21/19  5:49 PM   Result Value Ref Range    WBC 5.4 4.6 - 13.2 K/uL    RBC 3.86 (L) 4.20 - 5.30 M/uL    HGB 10.7 (L) 12.0 - 16.0 g/dL    HCT 33.5 (L) 35.0 - 45.0 %    MCV 86.8 74.0 - 97.0 FL    MCH 27.7 24.0 - 34.0 PG    MCHC 31.9 31.0 - 37.0 g/dL    RDW 16.3 (H) 11.6 - 14.5 %    PLATELET 873 308 - 429 K/uL    MPV 9.5 9.2 - 11.8 FL    NEUTROPHILS 50 40 - 73 %    LYMPHOCYTES 39 21 - 52 %    MONOCYTES 8 3 - 10 %    EOSINOPHILS 3 0 - 5 %    BASOPHILS 0 0 - 2 %    ABS. NEUTROPHILS 2.7 1.8 - 8.0 K/UL    ABS. LYMPHOCYTES 2.1 0.9 - 3.6 K/UL    ABS. MONOCYTES 0.4 0.05 - 1.2 K/UL    ABS. EOSINOPHILS 0.2 0.0 - 0.4 K/UL    ABS.  BASOPHILS 0.0 0.0 - 0.1 K/UL    DF AUTOMATED     METABOLIC PANEL, BASIC    Collection Time: 01/21/19  5:49 PM   Result Value Ref Range    Sodium 142 136 - 145 mmol/L    Potassium 3.9 3.5 - 5.5 mmol/L    Chloride 108 100 - 108 mmol/L    CO2 24 21 - 32 mmol/L    Anion gap 10 3.0 - 18 mmol/L    Glucose 161 (H) 74 - 99 mg/dL    BUN 36 (H) 7.0 - 18 MG/DL    Creatinine 2.39 (H) 0.6 - 1.3 MG/DL    BUN/Creatinine ratio 15 12 - 20      GFR est AA 25 (L) >60 ml/min/1.73m2    GFR est non-AA 20 (L) >60 ml/min/1.73m2    Calcium 8.5 8.5 - 10.1 MG/DL   CARDIAC PANEL,(CK, CKMB & TROPONIN)    Collection Time: 01/21/19  8:05 PM   Result Value Ref Range     26 - 192 U/L    CK - MB <1.0 <3.6 ng/ml    CK-MB Index  0.0 - 4.0 %     CALCULATION NOT PERFORMED WHEN RESULT IS BELOW LINEAR LIMIT    Troponin-I, QT <0.02 0.0 - 0.045 NG/ML       Radiologic Studies -   XR CHEST PORT   Final Result   Impression:   1. No acute infiltrate or effusion. Medical Decision Making   Provider Notes (Medical Decision Making):  MDM  Number of Diagnoses or Management Options  Diagnosis management comments: DIFFERENTIAL DIAGNOSES/ MEDICAL DECISION MAKING:  Chest pain etiologies include acute cardiac events to include possible acute myocardial infarction, acute coronary syndrome, pneumonia, chest wall pain (myofascial/ musculoskeletal etiology), chronic obstructive pulmonary disease (copd), acute asthma exacerbation, congestive heart failure, acute bronchitis, pulmonary embolism, upper respiratory infection, referred abdominal pain, other etiologies, versus combination of the above. I am the first provider for this patient. I reviewed the vital signs, available nursing notes, past medical history, past surgical history, family history and social history. Vital Signs-Reviewed the patient's vital signs. Records Reviewed: Nursing Notes and Old Medical Records (Time of Review: 5:12 PM)    ED Course: Progress Notes, Reevaluation, and Consults:    Labs essentially normal with the exception of hemoglobin 10.7 (chronic) and creatinine 2.39 (chronic). Cardiac enzymes negative. Repeat cardiac enzymes negative. Chest X-Ray showed No acute process. EKG showed NSR with rate of 98 bpm. With no ST elevations or depression and non specific T wave changes. 6:37 PM 1/21/2019     Diagnosis     I have reassessed the patient. Patient will be prescribed vikoden. Patient was discharged in stable condition. Patient is to return to emergency department if any new or worsening condition. Clinical Impression:   1.  Chest pain, unspecified type        Disposition: Discharge- Home    Follow-up Information     Follow up With Specialties Details Why Angel Arita MD [facility] Schedule an appointment as soon as possible for a visit in 1 day For recheck of ongoing symptoms [place] [phone]             _______________________________    Attestations:  Riky [address] acting as a scribe for and in the presence of [address] [place] [address],       January 21, 2019 at 5:12 PM       Provider Attestation:      I personally performed the services described in the documentation, reviewed the documentation, as recorded by the scribe in my presence, and it accurately and completely records my words and actions.  January 21, 2019 at 5:12 PM - Benji Mcleod DO    _______________________________ Yes

## (undated) DEVICE — RELOAD STPL L75MM OPN H3.8MM CLS 1.5MM WIRE DIA0.2MM REG

## (undated) DEVICE — SUTURE PROL SZ 5-0 L36IN NONABSORBABLE BLU L13MM C-1 3/8 8720H

## (undated) DEVICE — APPLIER CLP L9.375IN APER 2.1MM CLS L3.8MM 20 SM TI CLP

## (undated) DEVICE — BLADE ES ELASTOMERIC COAT INSUL DURABLE BEND UPTO 90DEG

## (undated) DEVICE — Device

## (undated) DEVICE — APPLIER CLP LIG SM 9IN DISP -- LIGACLIP

## (undated) DEVICE — ADHESIVE SKN CLSR HI VISC 2-O --

## (undated) DEVICE — INTENDED FOR TISSUE SEPARATION, AND OTHER PROCEDURES THAT REQUIRE A SHARP SURGICAL BLADE TO PUNCTURE OR CUT.: Brand: BARD-PARKER ® CARBON RIB-BACK BLADES

## (undated) DEVICE — ELECTRODE PT RET AD L9FT HI MOIST COND ADH HYDRGEL CORDED

## (undated) DEVICE — GLIDESHEATH SLENDER STAINLESS STEEL KIT: Brand: GLIDESHEATH SLENDER

## (undated) DEVICE — INTENDED FOR TISSUE SEPARATION, AND OTHER PROCEDURES THAT REQUIRE A SHARP SURGICAL BLADE TO PUNCTURE OR CUT.: Brand: BARD-PARKER ® STAINLESS STEEL BLADES

## (undated) DEVICE — INFLATION DEVICE: Brand: ENCORE 26

## (undated) DEVICE — BASIN EMSIS 16OZ GRAPHITE PLAS KID SHP MOLD GRAD FOR ORAL

## (undated) DEVICE — VESSELL LOOP RED MAXI

## (undated) DEVICE — SUTURE PROL SZ 6-0 L24IN NONABSORBABLE BLU L9.3MM BV-1 3/8 8805H

## (undated) DEVICE — PACK PROCEDURE SURG VASC CATH 161 MMC LF

## (undated) DEVICE — SYRINGE BLB 50CC IRRIG PLIABLE FNGR FLNG GRAD FLSK DISP

## (undated) DEVICE — DRAPE,ANGIO,BRACH,STERILE,38X44: Brand: MEDLINE

## (undated) DEVICE — SEALER ENDOSCP NANO COAT OPN DIV CRV L JAW LIGASURE IMPACT

## (undated) DEVICE — BLADE ES L4IN INSUL EDGE

## (undated) DEVICE — DRAPE,REIN 53X77,STERILE: Brand: MEDLINE

## (undated) DEVICE — PINNACLE INTRODUCER SHEATH: Brand: PINNACLE

## (undated) DEVICE — PREP SKN CHLRAPRP APL 26ML STR --

## (undated) DEVICE — CATHETER DIAG 5FR L100CM LUMN ID0.047IN JR4 CRV 0 SIDE H

## (undated) DEVICE — GAUZE,SPONGE,4"X4",16PLY,STRL,LF,10/TRAY: Brand: MEDLINE

## (undated) DEVICE — PROBE VASC 8MHZ WTRPRF

## (undated) DEVICE — UNDERPAD INCONT W23XL36IN STD BLU POLYPR BK FLUF SFT

## (undated) DEVICE — INTRODUCER SHTH 4FR CANN L11CM DIL TIP 25MM RED TUNGSTEN

## (undated) DEVICE — GAUZE, BORDER, 3"X6", 1.5"X4"PAD, STERIL: Brand: MEDLINE INDUSTRIES, INC.

## (undated) DEVICE — REM POLYHESIVE ADULT PATIENT RETURN ELECTRODE: Brand: VALLEYLAB

## (undated) DEVICE — SUTURE VCRL SZ 2-0 L27IN ABSRB UD L26MM SH 1/2 CIR J417H

## (undated) DEVICE — PROCEDURE KIT FLUID MGMT 10 FR CUST MAINFOLD

## (undated) DEVICE — SUTURE VCRL SZ 2-0 L36IN ABSRB UD L36MM CT-1 1/2 CIR J945H

## (undated) DEVICE — KIT CLN UP BON SECOURS MARYV

## (undated) DEVICE — BLANKET WRM AD W50XL85.8IN PACU FULL BODY FORC AIR

## (undated) DEVICE — SUTURE NONABSORBABLE MONOFILAMENT 6-0 C-1 1X30 IN PROLENE 8706H

## (undated) DEVICE — INTENDED FOR TISSUE SEPARATION, AND OTHER PROCEDURES THAT REQUIRE A SHARP SURGICAL BLADE TO PUNCTURE OR CUT.: Brand: BARD-PARKER ®  SAFETY SCALPED

## (undated) DEVICE — SUTURE VCRL SZ 3-0 L27IN ABSRB UD L26MM SH 1/2 CIR J416H

## (undated) DEVICE — SET FLD ADMIN 3 W STPCOCK FIX FEM L BOR 1IN

## (undated) DEVICE — SUTURE PROL SZ 7-0 L30IN NONABSORBABLE BLU L9.3MM BV-1 1/2 8703H

## (undated) DEVICE — CATHETER SCLERO L240CM NDL 25GA L4MM SHTH DIA2.3MM CNTRST

## (undated) DEVICE — TOWEL,OR,DSP,ST,WHITE,DLX,XR,4/PK,20PK/C: Brand: MEDLINE

## (undated) DEVICE — DRAPE XR C ARM 41X74IN LF --

## (undated) DEVICE — SUTURE PERMA-HAND SZ 3-0 L24IN NONABSORBABLE BLK W/O NDL SA74H

## (undated) DEVICE — GLOVE SURG SZ 7.5 L11.73IN FNGR THK9.8MIL STRW LTX POLYMER

## (undated) DEVICE — SYRINGE MED 10ML LUERLOCK TIP W/O SFTY DISP

## (undated) DEVICE — BLANKET WRM W40.2XL55.9IN IORT LO BODY + MISTRAL AIR

## (undated) DEVICE — SUTURE PERMA-HAND SZ 2-0 L24IN NONABSORBABLE BLK W/O NDL SA75H

## (undated) DEVICE — SUTURE PERMAHAND SZ 2-0 L30IN NONABSORBABLE BLK SILK W/O A305H

## (undated) DEVICE — SUTURE VCRL SZ 3-0 L18IN ABSRB UD L26MM SH 1/2 CIR J864D

## (undated) DEVICE — GLOVE SURG BIOGEL 7.5 LTX --

## (undated) DEVICE — CATHETER DIAG 5FR L100CM LUMN ID0.047IN JL3.5 CRV 0 SIDE H

## (undated) DEVICE — NEEDLE ANGIO 1 WALL ULT SMOOTH 19GAX7CM MERIT AVANCE

## (undated) DEVICE — DECANTER BAG 9": Brand: MEDLINE INDUSTRIES, INC.

## (undated) DEVICE — SUTURE PERMAHAND SZ 4-0 L12X30IN NONABSORBABLE BLK SILK A303H

## (undated) DEVICE — COVER LT HNDL UNIV PUR FLX DISP 2 PER PK

## (undated) DEVICE — GLOVE SURG BIOGEL 8.0 STRL -- SKINSENSE

## (undated) DEVICE — SOLUTION IV 1000ML 0.9% SOD CHL

## (undated) DEVICE — RADIFOCUS OPTITORQUE ANGIOGRAPHIC CATHETER: Brand: OPTITORQUE

## (undated) DEVICE — GLOVE SURG SZ 7 L11.33IN FNGR THK9.8MIL STRW LTX POLYMER

## (undated) DEVICE — TRAY,URINE METER,100% SILICONE,16FR10ML: Brand: MEDLINE

## (undated) DEVICE — GLOVE ORANGE PI 7 1/2   MSG9075

## (undated) DEVICE — ADHESIVE SKIN CLOSURE 0.7CC TOP MICROBIAL APPL DERMBND ADV

## (undated) DEVICE — DRESSING,GAUZE,XEROFORM,CURAD,1"X8",ST: Brand: CURAD

## (undated) DEVICE — SUTURE MCRYL SZ 4-0 L18IN ABSRB UD L19MM PS-2 3/8 CIR PRIM Y496G

## (undated) DEVICE — APPLIER CLP L9.38IN M LIG TI DISP STR RNG HNDL LIGACLP

## (undated) DEVICE — MEDI-VAC NON-CONDUCTIVE SUCTION TUBING: Brand: CARDINAL HEALTH

## (undated) DEVICE — STAPLER INT L75MM CUT LN L73MM STPL LN L77MM BLU B FRM 8

## (undated) DEVICE — SUT PROL 6-0 30IN C1 DA BLU --

## (undated) DEVICE — PTA BALLOON DILATATION CATHETER: Brand: STERLING™

## (undated) DEVICE — CATHETER DIAG AD L100CM DIA6FR STD JUDKINS L 4 POLYUR COR

## (undated) DEVICE — PRESSURE MONITORING SET: Brand: TRUWAVE

## (undated) DEVICE — ANGIO-SEAL VIP VASCULAR CLOSURE DEVICE: Brand: ANGIO-SEAL

## (undated) DEVICE — SUTURE MCRYL SZ 3-0 L27IN ABSRB UD L26MM SH 1/2 CIR Y416H

## (undated) DEVICE — COVER US PRB W15XL120CM W/ GEL RUBBERBAND TAPE STRP FLD GEN

## (undated) DEVICE — CANNULA ORIG TL CLR W FOAM CUSHIONS AND 14FT SUPL TB 3 CHN

## (undated) DEVICE — SUTURE MCRYL SZ 0 L36IN ABSRB UD L36MM CT-1 1/2 CIR Y946H

## (undated) DEVICE — SYRINGE MED 25GA 3ML L5/8IN SUBQ PLAS W/ DETACH NDL SFTY

## (undated) DEVICE — GUIDEWIRE VASC L260CM DIA0035IN TIP L3MM PTFE J STD TAPR FIX

## (undated) DEVICE — SHEET, T, LAPAROTOMY, STERILE: Brand: MEDLINE

## (undated) DEVICE — ADHESIVE SKIN CLSR 0.7ML TOP DERMBND ADV

## (undated) DEVICE — PACK PROCEDURE SURG MAJ W/ BASIN LF

## (undated) DEVICE — INTENDED FOR TISSUE SEPARATION, AND OTHER PROCEDURES THAT REQUIRE A SHARP SURGICAL BLADE TO PUNCTURE OR CUT.: Brand: BARD-PARKER SAFETY BLADES SIZE 10, STERILE

## (undated) DEVICE — SYRINGE MED 50ML LUERSLIP TIP

## (undated) DEVICE — SUTURE NONABSORBABLE MONOFILAMENT 5-0 C-1 1X24 IN PROLENE 8725H

## (undated) DEVICE — STAPLER INT AD L L25MM DIA12MM INTLUMN WHT TI CIR CUT 2 ROW

## (undated) DEVICE — SUTURE PDS II SZ 1 L36IN ABSRB VLT L36MM CT-1 1/2 CIR Z347H

## (undated) DEVICE — SYRINGE MED 50ML LUERLOCK TIP

## (undated) DEVICE — GARMENT,MEDLINE,DVT,INT,CALF,MED, GEN2: Brand: MEDLINE

## (undated) DEVICE — STERILE POLYISOPRENE POWDER-FREE SURGICAL GLOVES: Brand: PROTEXIS

## (undated) DEVICE — GUIDEWIRE WITH ICE™ HYDROPHILIC COATING: Brand: V-18™ CONTROL WIRE™

## (undated) DEVICE — SPONGE LAPAROTOMY W18XL18IN WHITE STRUNG RADIOPAQUE STERILE

## (undated) DEVICE — GARMENT COMPRESSION STANDARD FOR 17IN CALF FLOWTRON

## (undated) DEVICE — AGENT HEMOSTATIC SURGIFLOW MATRIX KIT W/THROMBIN

## (undated) DEVICE — PAD,NON-ADHERENT,3X8,STERILE,LF,1/PK: Brand: MEDLINE

## (undated) DEVICE — DRAPE TOWEL: Brand: CONVERTORS

## (undated) DEVICE — SUTURE PERMAHAND SZ 3-0 L18IN NONABSORBABLE BLK L26MM SH C013D

## (undated) DEVICE — 3M™ TEGADERM™ TRANSPARENT FILM DRESSING FRAME STYLE, 1626W, 4 IN X 4-3/4 IN (10 CM X 12 CM), 50/CT 4CT/CASE: Brand: 3M™ TEGADERM™

## (undated) DEVICE — APPLICATOR MEDICATED 26 CC SOLUTION HI LT ORNG CHLORAPREP

## (undated) DEVICE — TRAY PREP DRY W/ PREM GLV 2 APPL 6 SPNG 2 UNDPD 1 OVERWRAP

## (undated) DEVICE — PENCIL SMK EVAC ALL IN 1 DSGN ENH VISIBILITY IMPROVED AIR

## (undated) DEVICE — SUTURE VCRL SZ 4-0 L27IN ABSRB UD L19MM PS-2 3/8 CIR PRIM J426H